# Patient Record
Sex: FEMALE | Race: ASIAN | NOT HISPANIC OR LATINO
[De-identification: names, ages, dates, MRNs, and addresses within clinical notes are randomized per-mention and may not be internally consistent; named-entity substitution may affect disease eponyms.]

---

## 2017-01-05 ENCOUNTER — APPOINTMENT (OUTPATIENT)
Dept: ENDOCRINOLOGY | Facility: CLINIC | Age: 67
End: 2017-01-05

## 2017-01-05 VITALS
HEIGHT: 59.5 IN | DIASTOLIC BLOOD PRESSURE: 84 MMHG | WEIGHT: 163 LBS | OXYGEN SATURATION: 97 % | BODY MASS INDEX: 32.43 KG/M2 | HEART RATE: 88 BPM | SYSTOLIC BLOOD PRESSURE: 122 MMHG

## 2017-01-05 LAB
GLUCOSE BLDC GLUCOMTR-MCNC: 131
HBA1C MFR BLD HPLC: 8.3

## 2017-01-10 LAB
25(OH)D3 SERPL-MCNC: 31 NG/ML
ALBUMIN SERPL ELPH-MCNC: 3.9 G/DL
ALP BLD-CCNC: 84 U/L
ALT SERPL-CCNC: 23 U/L
ANION GAP SERPL CALC-SCNC: 15 MMOL/L
AST SERPL-CCNC: 19 U/L
BILIRUB SERPL-MCNC: 0.5 MG/DL
BUN SERPL-MCNC: 18 MG/DL
CALCIUM SERPL-MCNC: 9.5 MG/DL
CHLORIDE SERPL-SCNC: 103 MMOL/L
CHOLEST SERPL-MCNC: 102 MG/DL
CHOLEST/HDLC SERPL: 2.8 RATIO
CO2 SERPL-SCNC: 23 MMOL/L
CREAT SERPL-MCNC: 0.65 MG/DL
CREAT SPEC-SCNC: 61 MG/DL
GLUCOSE SERPL-MCNC: 131 MG/DL
HDLC SERPL-MCNC: 37 MG/DL
LDLC SERPL CALC-MCNC: 43 MG/DL
MICROALBUMIN 24H UR DL<=1MG/L-MCNC: 63.3 MG/DL
MICROALBUMIN/CREAT 24H UR-RTO: 1038 UG/MG
POTASSIUM SERPL-SCNC: 4.2 MMOL/L
PROT SERPL-MCNC: 7.2 G/DL
SODIUM SERPL-SCNC: 141 MMOL/L
TRIGL SERPL-MCNC: 108 MG/DL
TSH SERPL-ACNC: 0.76 UU/ML

## 2017-01-17 ENCOUNTER — MEDICATION RENEWAL (OUTPATIENT)
Age: 67
End: 2017-01-17

## 2017-02-24 ENCOUNTER — RX RENEWAL (OUTPATIENT)
Age: 67
End: 2017-02-24

## 2017-02-27 ENCOUNTER — APPOINTMENT (OUTPATIENT)
Dept: ENDOCRINOLOGY | Facility: CLINIC | Age: 67
End: 2017-02-27

## 2017-03-01 ENCOUNTER — RESULT REVIEW (OUTPATIENT)
Age: 67
End: 2017-03-01

## 2017-03-02 ENCOUNTER — RX RENEWAL (OUTPATIENT)
Age: 67
End: 2017-03-02

## 2017-03-14 ENCOUNTER — APPOINTMENT (OUTPATIENT)
Dept: NUCLEAR MEDICINE | Facility: CLINIC | Age: 67
End: 2017-03-14

## 2017-03-23 ENCOUNTER — MEDICATION RENEWAL (OUTPATIENT)
Age: 67
End: 2017-03-23

## 2017-03-27 ENCOUNTER — INPATIENT (INPATIENT)
Facility: HOSPITAL | Age: 67
LOS: 2 days | Discharge: HOME | End: 2017-03-30
Attending: INTERNAL MEDICINE | Admitting: INTERNAL MEDICINE
Payer: MEDICARE

## 2017-03-27 ENCOUNTER — RESULT REVIEW (OUTPATIENT)
Age: 67
End: 2017-03-27

## 2017-03-27 VITALS
TEMPERATURE: 98 F | RESPIRATION RATE: 16 BRPM | HEART RATE: 73 BPM | SYSTOLIC BLOOD PRESSURE: 132 MMHG | OXYGEN SATURATION: 100 % | DIASTOLIC BLOOD PRESSURE: 47 MMHG

## 2017-03-27 NOTE — ED ADULT TRIAGE NOTE - CHIEF COMPLAINT QUOTE
pt sent to ER for evaluation for TB. pt was seen at PMD and sent to ER for positive QuantiFeron. pt denies any hemoptysis, rapid weight loss, SOB, CP. pt given surgical mask in triage. pt appears comfortable and in NAD at present. pt placed in Isolation Rm

## 2017-03-28 DIAGNOSIS — A15.0 TUBERCULOSIS OF LUNG: ICD-10-CM

## 2017-03-28 DIAGNOSIS — C50.919 MALIGNANT NEOPLASM OF UNSPECIFIED SITE OF UNSPECIFIED FEMALE BREAST: ICD-10-CM

## 2017-03-28 DIAGNOSIS — Z29.9 ENCOUNTER FOR PROPHYLACTIC MEASURES, UNSPECIFIED: ICD-10-CM

## 2017-03-28 DIAGNOSIS — E11.9 TYPE 2 DIABETES MELLITUS WITHOUT COMPLICATIONS: ICD-10-CM

## 2017-03-28 LAB
ALBUMIN SERPL ELPH-MCNC: 3.9 G/DL — SIGNIFICANT CHANGE UP (ref 3.3–5)
ALP SERPL-CCNC: 87 U/L — SIGNIFICANT CHANGE UP (ref 40–120)
ALT FLD-CCNC: 36 U/L — HIGH (ref 4–33)
APTT BLD: 30.4 SEC — SIGNIFICANT CHANGE UP (ref 27.5–37.4)
AST SERPL-CCNC: 51 U/L — HIGH (ref 4–32)
BASOPHILS # BLD AUTO: 0.05 K/UL — SIGNIFICANT CHANGE UP (ref 0–0.2)
BASOPHILS NFR BLD AUTO: 0.4 % — SIGNIFICANT CHANGE UP (ref 0–2)
BILIRUB SERPL-MCNC: 0.4 MG/DL — SIGNIFICANT CHANGE UP (ref 0.2–1.2)
BLD GP AB SCN SERPL QL: NEGATIVE — SIGNIFICANT CHANGE UP
BUN SERPL-MCNC: 21 MG/DL — SIGNIFICANT CHANGE UP (ref 7–23)
CALCIUM SERPL-MCNC: 9.3 MG/DL — SIGNIFICANT CHANGE UP (ref 8.4–10.5)
CHLORIDE SERPL-SCNC: 97 MMOL/L — LOW (ref 98–107)
CO2 SERPL-SCNC: 19 MMOL/L — LOW (ref 22–31)
CREAT SERPL-MCNC: 0.73 MG/DL — SIGNIFICANT CHANGE UP (ref 0.5–1.3)
EOSINOPHIL # BLD AUTO: 0.31 K/UL — SIGNIFICANT CHANGE UP (ref 0–0.5)
EOSINOPHIL NFR BLD AUTO: 2.4 % — SIGNIFICANT CHANGE UP (ref 0–6)
GLUCOSE SERPL-MCNC: 112 MG/DL — HIGH (ref 70–99)
GRAM STN SPT: SIGNIFICANT CHANGE UP
GRAM STN SPT: SIGNIFICANT CHANGE UP
HCT VFR BLD CALC: 35.6 % — SIGNIFICANT CHANGE UP (ref 34.5–45)
HGB BLD-MCNC: 11.8 G/DL — SIGNIFICANT CHANGE UP (ref 11.5–15.5)
IMM GRANULOCYTES NFR BLD AUTO: 0.5 % — SIGNIFICANT CHANGE UP (ref 0–1.5)
INR BLD: 0.89 — SIGNIFICANT CHANGE UP (ref 0.88–1.17)
LYMPHOCYTES # BLD AUTO: 26.7 % — SIGNIFICANT CHANGE UP (ref 13–44)
LYMPHOCYTES # BLD AUTO: 3.41 K/UL — HIGH (ref 1–3.3)
MCHC RBC-ENTMCNC: 27.9 PG — SIGNIFICANT CHANGE UP (ref 27–34)
MCHC RBC-ENTMCNC: 33.1 % — SIGNIFICANT CHANGE UP (ref 32–36)
MCV RBC AUTO: 84.2 FL — SIGNIFICANT CHANGE UP (ref 80–100)
MONOCYTES # BLD AUTO: 0.78 K/UL — SIGNIFICANT CHANGE UP (ref 0–0.9)
MONOCYTES NFR BLD AUTO: 6.1 % — SIGNIFICANT CHANGE UP (ref 2–14)
NEUTROPHILS # BLD AUTO: 8.16 K/UL — HIGH (ref 1.8–7.4)
NEUTROPHILS NFR BLD AUTO: 63.9 % — SIGNIFICANT CHANGE UP (ref 43–77)
PLATELET # BLD AUTO: 392 K/UL — SIGNIFICANT CHANGE UP (ref 150–400)
PMV BLD: 9.5 FL — SIGNIFICANT CHANGE UP (ref 7–13)
POTASSIUM SERPL-MCNC: SIGNIFICANT CHANGE UP MMOL/L (ref 3.5–5.3)
POTASSIUM SERPL-SCNC: SIGNIFICANT CHANGE UP MMOL/L (ref 3.5–5.3)
PROT SERPL-MCNC: 8.4 G/DL — HIGH (ref 6–8.3)
PROTHROM AB SERPL-ACNC: 9.9 SEC — SIGNIFICANT CHANGE UP (ref 9.8–13.1)
RBC # BLD: 4.23 M/UL — SIGNIFICANT CHANGE UP (ref 3.8–5.2)
RBC # FLD: 14.1 % — SIGNIFICANT CHANGE UP (ref 10.3–14.5)
RH IG SCN BLD-IMP: POSITIVE — SIGNIFICANT CHANGE UP
SODIUM SERPL-SCNC: 137 MMOL/L — SIGNIFICANT CHANGE UP (ref 135–145)
SPECIMEN SOURCE: SIGNIFICANT CHANGE UP
SPECIMEN SOURCE: SIGNIFICANT CHANGE UP
WBC # BLD: 12.78 K/UL — HIGH (ref 3.8–10.5)
WBC # FLD AUTO: 12.78 K/UL — HIGH (ref 3.8–10.5)

## 2017-03-28 PROCEDURE — 88305 TISSUE EXAM BY PATHOLOGIST: CPT | Mod: 26

## 2017-03-28 PROCEDURE — 32550 INSERT PLEURAL CATH: CPT

## 2017-03-28 PROCEDURE — 31624 DX BRONCHOSCOPE/LAVAGE: CPT

## 2017-03-28 PROCEDURE — 99223 1ST HOSP IP/OBS HIGH 75: CPT

## 2017-03-28 PROCEDURE — 88312 SPECIAL STAINS GROUP 1: CPT | Mod: 26

## 2017-03-28 PROCEDURE — 88112 CYTOPATH CELL ENHANCE TECH: CPT | Mod: 26

## 2017-03-28 RX ORDER — AMLODIPINE BESYLATE 2.5 MG/1
2.5 TABLET ORAL
Qty: 0 | Refills: 0 | Status: DISCONTINUED | OUTPATIENT
Start: 2017-03-28 | End: 2017-03-29

## 2017-03-28 RX ORDER — LISINOPRIL 2.5 MG/1
40 TABLET ORAL DAILY
Qty: 0 | Refills: 0 | Status: DISCONTINUED | OUTPATIENT
Start: 2017-03-28 | End: 2017-03-30

## 2017-03-28 RX ORDER — CARVEDILOL PHOSPHATE 80 MG/1
6.25 CAPSULE, EXTENDED RELEASE ORAL EVERY 12 HOURS
Qty: 0 | Refills: 0 | Status: DISCONTINUED | OUTPATIENT
Start: 2017-03-28 | End: 2017-03-30

## 2017-03-28 RX ORDER — DEXTROSE 50 % IN WATER 50 %
25 SYRINGE (ML) INTRAVENOUS ONCE
Qty: 0 | Refills: 0 | Status: DISCONTINUED | OUTPATIENT
Start: 2017-03-28 | End: 2017-03-30

## 2017-03-28 RX ORDER — HEPARIN SODIUM 5000 [USP'U]/ML
5000 INJECTION INTRAVENOUS; SUBCUTANEOUS EVERY 8 HOURS
Qty: 0 | Refills: 0 | Status: DISCONTINUED | OUTPATIENT
Start: 2017-03-28 | End: 2017-03-30

## 2017-03-28 RX ORDER — DEXTROSE 50 % IN WATER 50 %
12.5 SYRINGE (ML) INTRAVENOUS ONCE
Qty: 0 | Refills: 0 | Status: DISCONTINUED | OUTPATIENT
Start: 2017-03-28 | End: 2017-03-30

## 2017-03-28 RX ORDER — SODIUM CHLORIDE 9 MG/ML
3 INJECTION INTRAMUSCULAR; INTRAVENOUS; SUBCUTANEOUS ONCE
Qty: 0 | Refills: 0 | Status: COMPLETED | OUTPATIENT
Start: 2017-03-28 | End: 2017-03-28

## 2017-03-28 RX ORDER — INFLUENZA VIRUS VACCINE 15; 15; 15; 15 UG/.5ML; UG/.5ML; UG/.5ML; UG/.5ML
0.5 SUSPENSION INTRAMUSCULAR ONCE
Qty: 0 | Refills: 0 | Status: DISCONTINUED | OUTPATIENT
Start: 2017-03-28 | End: 2017-03-30

## 2017-03-28 RX ORDER — CHOLECALCIFEROL (VITAMIN D3) 125 MCG
2000 CAPSULE ORAL DAILY
Qty: 0 | Refills: 0 | Status: DISCONTINUED | OUTPATIENT
Start: 2017-03-28 | End: 2017-03-30

## 2017-03-28 RX ORDER — INSULIN LISPRO 100/ML
VIAL (ML) SUBCUTANEOUS
Qty: 0 | Refills: 0 | Status: DISCONTINUED | OUTPATIENT
Start: 2017-03-28 | End: 2017-03-29

## 2017-03-28 RX ORDER — SODIUM CHLORIDE 9 MG/ML
1000 INJECTION, SOLUTION INTRAVENOUS
Qty: 0 | Refills: 0 | Status: DISCONTINUED | OUTPATIENT
Start: 2017-03-28 | End: 2017-03-28

## 2017-03-28 RX ORDER — GLUCAGON INJECTION, SOLUTION 0.5 MG/.1ML
1 INJECTION, SOLUTION SUBCUTANEOUS ONCE
Qty: 0 | Refills: 0 | Status: DISCONTINUED | OUTPATIENT
Start: 2017-03-28 | End: 2017-03-30

## 2017-03-28 RX ORDER — SODIUM CHLORIDE 9 MG/ML
1000 INJECTION INTRAMUSCULAR; INTRAVENOUS; SUBCUTANEOUS
Qty: 0 | Refills: 0 | Status: DISCONTINUED | OUTPATIENT
Start: 2017-03-28 | End: 2017-03-30

## 2017-03-28 RX ORDER — DEXTROSE 50 % IN WATER 50 %
1 SYRINGE (ML) INTRAVENOUS ONCE
Qty: 0 | Refills: 0 | Status: DISCONTINUED | OUTPATIENT
Start: 2017-03-28 | End: 2017-03-30

## 2017-03-28 RX ADMIN — SODIUM CHLORIDE 75 MILLILITER(S): 9 INJECTION INTRAMUSCULAR; INTRAVENOUS; SUBCUTANEOUS at 11:01

## 2017-03-28 RX ADMIN — CARVEDILOL PHOSPHATE 6.25 MILLIGRAM(S): 80 CAPSULE, EXTENDED RELEASE ORAL at 19:25

## 2017-03-28 RX ADMIN — AMLODIPINE BESYLATE 2.5 MILLIGRAM(S): 2.5 TABLET ORAL at 19:24

## 2017-03-28 RX ADMIN — SODIUM CHLORIDE 3 MILLILITER(S): 9 INJECTION INTRAMUSCULAR; INTRAVENOUS; SUBCUTANEOUS at 01:20

## 2017-03-28 RX ADMIN — HEPARIN SODIUM 5000 UNIT(S): 5000 INJECTION INTRAVENOUS; SUBCUTANEOUS at 21:47

## 2017-03-28 NOTE — H&P ADULT. - ASSESSMENT
66 y/o F with DM2, recently diagnosed breast cancer admitted from [facility] pulmonology for concern for Tb.

## 2017-03-28 NOTE — H&P ADULT. - HISTORY OF PRESENT ILLNESS
66 y/o F with DM2, recently diagnosed breast cancer admitted for concern for Tb.    Patient was recently diagnosed with breast cancer. She had imaging for staging and a PET and CT chest showed a lung lesion. She was seen by her pulmonologist, Dr. Jean Cerna, who did a QuantiFeron test, which came back positive. Patient was sent to the ED by Dr. Cerna for a bronch to r/o Tb. Patient denies any cough, hemoptysis, weight loss, chills, fever. She moved from [place] to [place] over 10 years ago. No close contacts with active Tb. Currently patient's only complaint is feeling weak because she has been NPO since arriving in the ED.    On admission, vitals were 98.1, 73, 134/47, 100% on RA. Labs notable for WBC 12.78, AST/ALT 51/36. Imaging done at [facility], not available here. Discs left in the chart to be uploaded to our system.

## 2017-03-28 NOTE — ED PROVIDER NOTE - MEDICAL DECISION MAKING DETAILS
pt with a positive blood test and imaging concerning for TB.  Here for bronch.  [facility] get pre-op labs [facility]

## 2017-03-28 NOTE — H&P ADULT. - PROBLEM SELECTOR PLAN 4
- currently holding chemical ppx given possibility of procedure, if no procedure would start lovenox for dvt ppx

## 2017-03-28 NOTE — H&P ADULT. - PROBLEM SELECTOR PLAN 1
Unable to see images of CT, but per report, there is concern for a lesion that may be Tb given + Quant. She has no s/s of active Tb currently.  - discussed with Dr. Carrasco - she will consult pulmonology Unable to see images of CT, but per report, there is concern for a lesion that may be Tb given + Quant. She has no s/s of active Tb currently.  - discussed with Dr. Carrasco - she will consult pulmonology  - family has imaging discs, if pulm feels it would be helpful, we can have them loaded into our system (discs placed in chart)

## 2017-03-28 NOTE — ED PROVIDER NOTE - OBJECTIVE STATEMENT
66 yo [place] 90s recently Dx with B breast Ca.  On routine staging for PET found to have lung lesion which was referred to pulm and on CT lesion concerning for TB.  Got quantaferon test which was positive and referred to ED for bronch.  Pt denies any cough with hemoptysis, weight loss or night sweats.  No other TB risk factors except country of orgin

## 2017-03-29 LAB
ALBUMIN SERPL ELPH-MCNC: 3.5 G/DL — SIGNIFICANT CHANGE UP (ref 3.3–5)
ALP SERPL-CCNC: 79 U/L — SIGNIFICANT CHANGE UP (ref 40–120)
ALT FLD-CCNC: 22 U/L — SIGNIFICANT CHANGE UP (ref 4–33)
APPEARANCE UR: CLEAR — SIGNIFICANT CHANGE UP
AST SERPL-CCNC: 21 U/L — SIGNIFICANT CHANGE UP (ref 4–32)
BILIRUB SERPL-MCNC: 0.3 MG/DL — SIGNIFICANT CHANGE UP (ref 0.2–1.2)
BILIRUB UR-MCNC: NEGATIVE — SIGNIFICANT CHANGE UP
BLOOD UR QL VISUAL: NEGATIVE — SIGNIFICANT CHANGE UP
BUN SERPL-MCNC: 14 MG/DL — SIGNIFICANT CHANGE UP (ref 7–23)
CALCIUM SERPL-MCNC: 9.1 MG/DL — SIGNIFICANT CHANGE UP (ref 8.4–10.5)
CHLORIDE SERPL-SCNC: 99 MMOL/L — SIGNIFICANT CHANGE UP (ref 98–107)
CO2 SERPL-SCNC: 23 MMOL/L — SIGNIFICANT CHANGE UP (ref 22–31)
COLOR SPEC: SIGNIFICANT CHANGE UP
CREAT SERPL-MCNC: 0.63 MG/DL — SIGNIFICANT CHANGE UP (ref 0.5–1.3)
CULTURE - ACID FAST SMEAR CONCENTRATED: SIGNIFICANT CHANGE UP
CULTURE - ACID FAST SMEAR CONCENTRATED: SIGNIFICANT CHANGE UP
GLUCOSE SERPL-MCNC: 300 MG/DL — HIGH (ref 70–99)
GLUCOSE UR-MCNC: >1000 — SIGNIFICANT CHANGE UP
HBA1C BLD-MCNC: 7.3 % — HIGH (ref 4–5.6)
HCT VFR BLD CALC: 33.5 % — LOW (ref 34.5–45)
HGB BLD-MCNC: 10.7 G/DL — LOW (ref 11.5–15.5)
KETONES UR-MCNC: NEGATIVE — SIGNIFICANT CHANGE UP
LACTATE SERPL-SCNC: 1.2 MMOL/L — SIGNIFICANT CHANGE UP (ref 0.5–2)
LEUKOCYTE ESTERASE UR-ACNC: SIGNIFICANT CHANGE UP
MCHC RBC-ENTMCNC: 27.2 PG — SIGNIFICANT CHANGE UP (ref 27–34)
MCHC RBC-ENTMCNC: 31.9 % — LOW (ref 32–36)
MCV RBC AUTO: 85 FL — SIGNIFICANT CHANGE UP (ref 80–100)
NITRITE UR-MCNC: NEGATIVE — SIGNIFICANT CHANGE UP
PH UR: 6.5 — SIGNIFICANT CHANGE UP (ref 4.6–8)
PLATELET # BLD AUTO: 341 K/UL — SIGNIFICANT CHANGE UP (ref 150–400)
PMV BLD: 9.6 FL — SIGNIFICANT CHANGE UP (ref 7–13)
POTASSIUM SERPL-MCNC: 4.2 MMOL/L — SIGNIFICANT CHANGE UP (ref 3.5–5.3)
POTASSIUM SERPL-SCNC: 4.2 MMOL/L — SIGNIFICANT CHANGE UP (ref 3.5–5.3)
PROT SERPL-MCNC: 7.2 G/DL — SIGNIFICANT CHANGE UP (ref 6–8.3)
PROT UR-MCNC: 30 — HIGH
RBC # BLD: 3.94 M/UL — SIGNIFICANT CHANGE UP (ref 3.8–5.2)
RBC # FLD: 14.1 % — SIGNIFICANT CHANGE UP (ref 10.3–14.5)
RBC CASTS # UR COMP ASSIST: SIGNIFICANT CHANGE UP (ref 0–?)
SODIUM SERPL-SCNC: 138 MMOL/L — SIGNIFICANT CHANGE UP (ref 135–145)
SP GR SPEC: 1.01 — SIGNIFICANT CHANGE UP (ref 1–1.03)
SPECIMEN SOURCE: SIGNIFICANT CHANGE UP
SPECIMEN SOURCE: SIGNIFICANT CHANGE UP
SQUAMOUS # UR AUTO: SIGNIFICANT CHANGE UP
UROBILINOGEN FLD QL: NORMAL E.U. — SIGNIFICANT CHANGE UP (ref 0.1–0.2)
WBC # BLD: 15.9 K/UL — HIGH (ref 3.8–10.5)
WBC # FLD AUTO: 15.9 K/UL — HIGH (ref 3.8–10.5)
WBC UR QL: HIGH (ref 0–?)

## 2017-03-29 PROCEDURE — 71010: CPT | Mod: 26

## 2017-03-29 RX ORDER — AMLODIPINE BESYLATE 2.5 MG/1
2.5 TABLET ORAL ONCE
Qty: 0 | Refills: 0 | Status: COMPLETED | OUTPATIENT
Start: 2017-03-29 | End: 2017-03-29

## 2017-03-29 RX ORDER — AMLODIPINE BESYLATE 2.5 MG/1
5 TABLET ORAL
Qty: 0 | Refills: 0 | Status: DISCONTINUED | OUTPATIENT
Start: 2017-03-30 | End: 2017-03-30

## 2017-03-29 RX ORDER — INSULIN GLARGINE 100 [IU]/ML
15 INJECTION, SOLUTION SUBCUTANEOUS AT BEDTIME
Qty: 0 | Refills: 0 | Status: DISCONTINUED | OUTPATIENT
Start: 2017-03-29 | End: 2017-03-30

## 2017-03-29 RX ORDER — INSULIN LISPRO 100/ML
VIAL (ML) SUBCUTANEOUS
Qty: 0 | Refills: 0 | Status: DISCONTINUED | OUTPATIENT
Start: 2017-03-29 | End: 2017-03-30

## 2017-03-29 RX ORDER — INSULIN LISPRO 100/ML
VIAL (ML) SUBCUTANEOUS AT BEDTIME
Qty: 0 | Refills: 0 | Status: DISCONTINUED | OUTPATIENT
Start: 2017-03-29 | End: 2017-03-30

## 2017-03-29 RX ORDER — INSULIN LISPRO 100/ML
7 VIAL (ML) SUBCUTANEOUS
Qty: 0 | Refills: 0 | Status: DISCONTINUED | OUTPATIENT
Start: 2017-03-29 | End: 2017-03-30

## 2017-03-29 RX ORDER — ACETAMINOPHEN 500 MG
650 TABLET ORAL ONCE
Qty: 0 | Refills: 0 | Status: COMPLETED | OUTPATIENT
Start: 2017-03-29 | End: 2017-03-29

## 2017-03-29 RX ADMIN — HEPARIN SODIUM 5000 UNIT(S): 5000 INJECTION INTRAVENOUS; SUBCUTANEOUS at 13:41

## 2017-03-29 RX ADMIN — CARVEDILOL PHOSPHATE 6.25 MILLIGRAM(S): 80 CAPSULE, EXTENDED RELEASE ORAL at 05:08

## 2017-03-29 RX ADMIN — AMLODIPINE BESYLATE 2.5 MILLIGRAM(S): 2.5 TABLET ORAL at 18:08

## 2017-03-29 RX ADMIN — HEPARIN SODIUM 5000 UNIT(S): 5000 INJECTION INTRAVENOUS; SUBCUTANEOUS at 05:08

## 2017-03-29 RX ADMIN — Medication 650 MILLIGRAM(S): at 02:17

## 2017-03-29 RX ADMIN — LISINOPRIL 40 MILLIGRAM(S): 2.5 TABLET ORAL at 05:08

## 2017-03-29 RX ADMIN — AMLODIPINE BESYLATE 2.5 MILLIGRAM(S): 2.5 TABLET ORAL at 05:08

## 2017-03-29 RX ADMIN — CARVEDILOL PHOSPHATE 6.25 MILLIGRAM(S): 80 CAPSULE, EXTENDED RELEASE ORAL at 18:08

## 2017-03-29 RX ADMIN — Medication 4: at 12:33

## 2017-03-29 RX ADMIN — AMLODIPINE BESYLATE 2.5 MILLIGRAM(S): 2.5 TABLET ORAL at 18:51

## 2017-03-29 RX ADMIN — SODIUM CHLORIDE 75 MILLILITER(S): 9 INJECTION INTRAMUSCULAR; INTRAVENOUS; SUBCUTANEOUS at 12:34

## 2017-03-29 RX ADMIN — Medication 2000 UNIT(S): at 12:33

## 2017-03-29 RX ADMIN — SODIUM CHLORIDE 75 MILLILITER(S): 9 INJECTION INTRAMUSCULAR; INTRAVENOUS; SUBCUTANEOUS at 05:08

## 2017-03-29 RX ADMIN — INSULIN GLARGINE 15 UNIT(S): 100 INJECTION, SOLUTION SUBCUTANEOUS at 22:55

## 2017-03-29 RX ADMIN — SODIUM CHLORIDE 75 MILLILITER(S): 9 INJECTION INTRAMUSCULAR; INTRAVENOUS; SUBCUTANEOUS at 18:52

## 2017-03-29 RX ADMIN — Medication 7 UNIT(S): at 18:08

## 2017-03-29 RX ADMIN — Medication 6: at 08:43

## 2017-03-29 RX ADMIN — HEPARIN SODIUM 5000 UNIT(S): 5000 INJECTION INTRAVENOUS; SUBCUTANEOUS at 22:56

## 2017-03-29 RX ADMIN — Medication 2: at 18:08

## 2017-03-30 ENCOUNTER — TRANSCRIPTION ENCOUNTER (OUTPATIENT)
Age: 67
End: 2017-03-30

## 2017-03-30 VITALS
SYSTOLIC BLOOD PRESSURE: 153 MMHG | HEART RATE: 78 BPM | DIASTOLIC BLOOD PRESSURE: 47 MMHG | RESPIRATION RATE: 18 BRPM | TEMPERATURE: 100 F | OXYGEN SATURATION: 97 %

## 2017-03-30 LAB
ALBUMIN SERPL ELPH-MCNC: 3.5 G/DL — SIGNIFICANT CHANGE UP (ref 3.3–5)
ALP SERPL-CCNC: 78 U/L — SIGNIFICANT CHANGE UP (ref 40–120)
ALT FLD-CCNC: 34 U/L — HIGH (ref 4–33)
AST SERPL-CCNC: 28 U/L — SIGNIFICANT CHANGE UP (ref 4–32)
BACTERIA SPT RESP CULT: SIGNIFICANT CHANGE UP
BACTERIA SPT RESP CULT: SIGNIFICANT CHANGE UP
BILIRUB SERPL-MCNC: 0.4 MG/DL — SIGNIFICANT CHANGE UP (ref 0.2–1.2)
BUN SERPL-MCNC: 10 MG/DL — SIGNIFICANT CHANGE UP (ref 7–23)
CALCIUM SERPL-MCNC: 9.4 MG/DL — SIGNIFICANT CHANGE UP (ref 8.4–10.5)
CHLORIDE SERPL-SCNC: 102 MMOL/L — SIGNIFICANT CHANGE UP (ref 98–107)
CO2 SERPL-SCNC: 21 MMOL/L — LOW (ref 22–31)
CREAT SERPL-MCNC: 0.62 MG/DL — SIGNIFICANT CHANGE UP (ref 0.5–1.3)
GLUCOSE SERPL-MCNC: 207 MG/DL — HIGH (ref 70–99)
HCT VFR BLD CALC: 34 % — LOW (ref 34.5–45)
HGB BLD-MCNC: 10.8 G/DL — LOW (ref 11.5–15.5)
MCHC RBC-ENTMCNC: 26.9 PG — LOW (ref 27–34)
MCHC RBC-ENTMCNC: 31.8 % — LOW (ref 32–36)
MCV RBC AUTO: 84.6 FL — SIGNIFICANT CHANGE UP (ref 80–100)
NON-GYNECOLOGICAL CYTOLOGY STUDY: SIGNIFICANT CHANGE UP
PLATELET # BLD AUTO: 350 K/UL — SIGNIFICANT CHANGE UP (ref 150–400)
PMV BLD: 10 FL — SIGNIFICANT CHANGE UP (ref 7–13)
POTASSIUM SERPL-MCNC: 4 MMOL/L — SIGNIFICANT CHANGE UP (ref 3.5–5.3)
POTASSIUM SERPL-SCNC: 4 MMOL/L — SIGNIFICANT CHANGE UP (ref 3.5–5.3)
PROT SERPL-MCNC: 7.4 G/DL — SIGNIFICANT CHANGE UP (ref 6–8.3)
RBC # BLD: 4.02 M/UL — SIGNIFICANT CHANGE UP (ref 3.8–5.2)
RBC # FLD: 14.2 % — SIGNIFICANT CHANGE UP (ref 10.3–14.5)
SODIUM SERPL-SCNC: 139 MMOL/L — SIGNIFICANT CHANGE UP (ref 135–145)
SPECIMEN SOURCE: SIGNIFICANT CHANGE UP
WBC # BLD: 9.9 K/UL — SIGNIFICANT CHANGE UP (ref 3.8–10.5)
WBC # FLD AUTO: 9.9 K/UL — SIGNIFICANT CHANGE UP (ref 3.8–10.5)

## 2017-03-30 RX ORDER — PREGABALIN 225 MG/1
1 CAPSULE ORAL
Qty: 0 | Refills: 0 | COMMUNITY

## 2017-03-30 RX ADMIN — Medication 4: at 08:39

## 2017-03-30 RX ADMIN — CARVEDILOL PHOSPHATE 6.25 MILLIGRAM(S): 80 CAPSULE, EXTENDED RELEASE ORAL at 05:55

## 2017-03-30 RX ADMIN — AMLODIPINE BESYLATE 5 MILLIGRAM(S): 2.5 TABLET ORAL at 05:55

## 2017-03-30 RX ADMIN — HEPARIN SODIUM 5000 UNIT(S): 5000 INJECTION INTRAVENOUS; SUBCUTANEOUS at 05:55

## 2017-03-30 RX ADMIN — SODIUM CHLORIDE 75 MILLILITER(S): 9 INJECTION INTRAMUSCULAR; INTRAVENOUS; SUBCUTANEOUS at 06:00

## 2017-03-30 RX ADMIN — Medication 7 UNIT(S): at 12:08

## 2017-03-30 RX ADMIN — Medication 2: at 12:08

## 2017-03-30 RX ADMIN — Medication 2000 UNIT(S): at 12:09

## 2017-03-30 RX ADMIN — Medication 7 UNIT(S): at 08:39

## 2017-03-30 RX ADMIN — HEPARIN SODIUM 5000 UNIT(S): 5000 INJECTION INTRAVENOUS; SUBCUTANEOUS at 15:00

## 2017-03-30 RX ADMIN — LISINOPRIL 40 MILLIGRAM(S): 2.5 TABLET ORAL at 05:55

## 2017-03-30 NOTE — DISCHARGE NOTE ADULT - MEDICATION SUMMARY - MEDICATIONS TO TAKE
I will START or STAY ON the medications listed below when I get home from the hospital:    Magnesium Capsules  -- 1 cap(s) by mouth once a day  -- Indication: For supplement    Calcium  -- 1 tab(s) by mouth once a day  -- Indication: For supplement    aspirin 81 mg oral delayed release tablet  -- 1 tab(s) by mouth 3 to 4 times per week  -- Indication: For CAD prophylaxis    ramipril 10 mg oral capsule  -- 1 cap(s) by mouth 2 times a day  -- Indication: For hypertension    metFORMIN 1000 mg oral tablet  -- 1 tab(s) by mouth 2 times a day  -- Indication: For Type 2 diabetes mellitus without complication, without long-term current use of insulin    HumuLIN 70/30 KwikPen 70 units-30 units/mL subcutaneous suspension  --  46 units subcutaneous in the morning and 26 units subcutaneous in the evening  -- Indication: For Type 2 diabetes mellitus without complication, without long-term current use of insulin    carvedilol 12.5 mg oral tablet  -- 1 tab(s) by mouth 2 times a day  -- Indication: For hypertension    amLODIPine 5 mg oral tablet  -- 1 tab(s) by mouth 2 times a day  -- Indication: For hypertension    furosemide 20 mg oral tablet  -- 1 tab(s) by mouth once a day  -- Indication: For hypertension    Flax Seed Oil oral capsule  -- 1 cap(s) by mouth once a day  -- Indication: For supplement    tiZANidine 2 mg oral capsule  -- 1 cap(s) by mouth once a day (at bedtime)  -- Indication: For insomnia    multivitamin  -- 1 tab(s) by mouth once a day  -- Indication: For supplement    B-12 Resin 1000 mcg oral tablet  -- 1 tab(s) by mouth once a day  -- Indication: For supplement    biotin 5 mg oral capsule  -- 1 cap(s) by mouth once a day  -- Indication: For supplement    Vitamin D3 2000 intl units oral tablet  -- 1 tab(s) by mouth once a day  -- Indication: For supplement

## 2017-03-30 NOTE — DISCHARGE NOTE ADULT - CARE PROVIDER_API CALL
Dr. Cerna,   Pulmonology  Phone: (   )    -  Fax: (   )    -    Dr. Jolley,   Oncology  Phone: (   )    -  Fax: (   )    -    [facility] PCP,   Phone: (   )    -  Fax: (   )    - Dr. Cerna,   Pulmonology  Phone: (   )    -  Fax: (   )    -    [facility] PCP,   Phone: (   )    -  Fax: (   )    -    Silver Campuzano), Internal Medicine; Medical Oncology  [address] [place] [address]  Phone: [phone]  Fax: [phone]

## 2017-03-30 NOTE — DISCHARGE NOTE ADULT - HOSPITAL COURSE
66 [place] 90s recently Dx with B breast Ca.  On routine staging for PET found to have lung lesion which was referred to pulm and on CT lesion concerning for TB.  Got quantaferon test which was positive and referred to ED for bronch.     Hospital Course:    TB (pulmonary tuberculosis)  -Unable to see images of CT, but per report, there is concern for a lesion that may be Tb given + Quant.   -She has no s/s of active Tb currently.  - family has imaging discs, if pulm feels it would be helpful, we can have them loaded into our system (discs placed in chart)Unable to see images of CT, but per report, there is concern for a lesion that may be Tb given + Quant. She has no s/s of active Tb currently.  -pulmonary consulted - Rell Mcnulty  -CT surgery consulted for bronch on 3/28: specimens sent, afb negative, culture NGTD  -AFB- negative    Fever-UA negative  -CXR- Ill-defined opacity in the right upper lung field may represent bronchiectasis and a possible cavity formation.   blood cultures negative,   sputum cultures negative  afb cultures negative    Type 2 diabetes mellitus without complication- insulin sliding scale during admsision-A1c- 7.3  Malignant neoplasm of female breast-Followed by onc outpatient  Prophylactic measure- currently holding chemical ppx given possibility of procedure, if no procedure would start lovenox for dvt ppx.     dispo: home 68 yo [place] 90s recently Dx with Breast Ca.  On routine staging for PET found to have lung lesion which was referred to pulm and on CT lesion concerning for TB.  Got quantaferon test which was positive and referred to ED for bronch.     Hospital Course:    TB (pulmonary tuberculosis) - ruled out.  - Outpt CT with RUL lesion.  - She has no s/s of active Tb currently.  - family has imaging discs, if pulm feels it would be helpful, we can have them loaded into our system (discs placed in chart)Unable to see images of CT, but per report, there is concern for a lesion that may be Tb given + Quant. She has no s/s of active Tb currently.  -pulmonary consulted - Dr. Mcnulty  -CT surgery consulted. bronch done on 3/28: specimens sent, afb negative, culture NGTD  -AFB- negative    Fever post anesthesia and left arm rash-UA negative, resolved. likely reaction to anesthetics.   -CXR- Ill-defined opacity in the right upper lung field may represent bronchiectasis and a possible cavity formation.   blood cultures negative,   sputum cultures negative  afb cultures negative    Type 2 diabetes mellitus without complication- insulin sliding scale during admsision-A1c- 7.3  Malignant neoplasm of female breast-Followed by onc outpatient  Prophylactic measure- currently holding chemical ppx given possibility of procedure, if no procedure would start lovenox for dvt ppx.     dispo: home  d/w  at bedside.     More than 30 mins were spent evaluating patient and coordinating care for discharge.

## 2017-03-30 NOTE — DISCHARGE NOTE ADULT - PROVIDER TOKENS
FREE:[LAST:[Dr. Cerna],PHONE:[(   )    -],FAX:[(   )    -],ADDRESS:[Pulmonology]],FREE:[LAST:[Dr. Jolley],PHONE:[(   )    -],FAX:[(   )    -],ADDRESS:[Oncology]],FREE:[LAST:[[facility] PCP],PHONE:[(   )    -],FAX:[(   )    -]] FREE:[LAST:[Dr. Cerna],PHONE:[(   )    -],FAX:[(   )    -],ADDRESS:[Pulmonology]],FREE:[LAST:[[facility] PCP],PHONE:[(   )    -],FAX:[(   )    -]],TOKEN:'[id number]'

## 2017-03-30 NOTE — DISCHARGE NOTE ADULT - PLAN OF CARE
ruled out You were evaluated for a suspicion of TB during your stay.  Specimens were sent that were negative for TB.  No further medication is necessary for treatment.   Follow up with your pulmonologist Dr. Cerna for further pulmonary management. Follow up with Dr. Donnelly for further management. Continue a consistent carbohydrate/ diabetic diet  monitor blood sugars Follow up with Dr. Campuzano for further management. Continue a consistent carbohydrate/ diabetic diet  monitor blood sugars  Continue blood sugar monitoring and continue insulin

## 2017-03-30 NOTE — DISCHARGE NOTE ADULT - CARE PLAN
Principal Discharge DX:	TB (pulmonary tuberculosis)  Goal:	ruled out  Instructions for follow-up, activity and diet:	You were evaluated for a suspicion of TB during your stay.  Specimens were sent that were negative for TB.  No further medication is necessary for treatment.   Follow up with your pulmonologist Dr. Cerna for further pulmonary management.  Secondary Diagnosis:	Breast CA  Instructions for follow-up, activity and diet:	Follow up with Dr. Donnelly for further management.  Secondary Diagnosis:	Type 2 diabetes mellitus without complication, without long-term current use of insulin  Instructions for follow-up, activity and diet:	Continue a consistent carbohydrate/ diabetic diet  monitor blood sugars Principal Discharge DX:	TB (pulmonary tuberculosis)  Goal:	ruled out  Instructions for follow-up, activity and diet:	You were evaluated for a suspicion of TB during your stay.  Specimens were sent that were negative for TB.  No further medication is necessary for treatment.   Follow up with your pulmonologist Dr. Cerna for further pulmonary management.  Secondary Diagnosis:	Breast CA  Instructions for follow-up, activity and diet:	Follow up with Dr. Campuzano for further management.  Secondary Diagnosis:	Type 2 diabetes mellitus without complication, without long-term current use of insulin  Instructions for follow-up, activity and diet:	Continue a consistent carbohydrate/ diabetic diet  monitor blood sugars  Continue blood sugar monitoring and continue insulin

## 2017-03-30 NOTE — DISCHARGE NOTE ADULT - PATIENT PORTAL LINK FT
“You can access the [facility] Patient Portal, offered by [facility], by registering with the following website: [email or web address]”

## 2017-03-30 NOTE — DISCHARGE NOTE ADULT - CARE PROVIDERS DIRECT ADDRESSES
,DirectAddress_Unknown,DirectAddress_Unknown,DirectAddress_Unknown,[email or web address], [email or web address] HospitalriLists of [email or web address] in the [email or web address]

## 2017-04-03 LAB
BACTERIA BLD CULT: SIGNIFICANT CHANGE UP
BACTERIA BLD CULT: SIGNIFICANT CHANGE UP

## 2017-04-04 LAB
SPECIMEN SOURCE: SIGNIFICANT CHANGE UP
SPECIMEN SOURCE: SIGNIFICANT CHANGE UP

## 2017-04-06 ENCOUNTER — TRANSCRIPTION ENCOUNTER (OUTPATIENT)
Age: 67
End: 2017-04-06

## 2017-04-25 LAB
FUNGUS SPEC QL CULT: SIGNIFICANT CHANGE UP
FUNGUS SPEC QL CULT: SIGNIFICANT CHANGE UP

## 2017-05-09 LAB
ACID FAST STN SPEC: SIGNIFICANT CHANGE UP
ACID FAST STN SPEC: SIGNIFICANT CHANGE UP

## 2017-05-11 ENCOUNTER — APPOINTMENT (OUTPATIENT)
Dept: ENDOCRINOLOGY | Facility: CLINIC | Age: 67
End: 2017-05-11

## 2017-07-17 PROBLEM — C50.919 MALIGNANT NEOPLASM OF UNSPECIFIED SITE OF UNSPECIFIED FEMALE BREAST: Chronic | Status: ACTIVE | Noted: 2017-03-28

## 2017-07-17 PROBLEM — E11.9 TYPE 2 DIABETES MELLITUS WITHOUT COMPLICATIONS: Chronic | Status: ACTIVE | Noted: 2017-03-28

## 2017-07-30 ENCOUNTER — RX RENEWAL (OUTPATIENT)
Age: 67
End: 2017-07-30

## 2017-08-31 ENCOUNTER — APPOINTMENT (OUTPATIENT)
Dept: ENDOCRINOLOGY | Facility: CLINIC | Age: 67
End: 2017-08-31
Payer: MEDICARE

## 2017-08-31 VITALS
SYSTOLIC BLOOD PRESSURE: 110 MMHG | HEIGHT: 59 IN | BODY MASS INDEX: 30.24 KG/M2 | WEIGHT: 150 LBS | HEART RATE: 87 BPM | DIASTOLIC BLOOD PRESSURE: 60 MMHG | OXYGEN SATURATION: 98 %

## 2017-08-31 PROCEDURE — 99214 OFFICE O/P EST MOD 30 MIN: CPT

## 2017-08-31 RX ORDER — FUROSEMIDE 20 MG/1
20 TABLET ORAL
Qty: 1 | Refills: 2 | Status: DISCONTINUED | COMMUNITY
Start: 2017-01-05 | End: 2017-08-31

## 2017-09-01 LAB
25(OH)D3 SERPL-MCNC: 12.2 NG/ML
ANION GAP SERPL CALC-SCNC: 16 MMOL/L
BUN SERPL-MCNC: 13 MG/DL
CALCIUM SERPL-MCNC: 9.5 MG/DL
CHLORIDE SERPL-SCNC: 101 MMOL/L
CO2 SERPL-SCNC: 21 MMOL/L
CREAT SERPL-MCNC: 1 MG/DL
GLUCOSE SERPL-MCNC: 105 MG/DL
POTASSIUM SERPL-SCNC: 5.2 MMOL/L
SODIUM SERPL-SCNC: 138 MMOL/L

## 2017-09-18 ENCOUNTER — MEDICATION RENEWAL (OUTPATIENT)
Age: 67
End: 2017-09-18

## 2017-09-18 RX ORDER — INSULIN LISPRO 100 [IU]/ML
100 INJECTION, SOLUTION INTRAVENOUS; SUBCUTANEOUS
Qty: 1 | Refills: 3 | Status: DISCONTINUED | COMMUNITY
Start: 2017-08-31 | End: 2017-09-18

## 2017-09-18 RX ORDER — INSULIN GLARGINE 100 [IU]/ML
100 INJECTION, SOLUTION SUBCUTANEOUS
Qty: 1 | Refills: 3 | Status: DISCONTINUED | COMMUNITY
Start: 2017-08-31 | End: 2017-09-18

## 2017-10-05 ENCOUNTER — MESSAGE (OUTPATIENT)
Age: 67
End: 2017-10-05

## 2017-10-06 ENCOUNTER — CLINICAL ADVICE (OUTPATIENT)
Age: 67
End: 2017-10-06

## 2017-10-30 ENCOUNTER — RX RENEWAL (OUTPATIENT)
Age: 67
End: 2017-10-30

## 2017-11-02 ENCOUNTER — CLINICAL ADVICE (OUTPATIENT)
Age: 67
End: 2017-11-02

## 2017-12-22 ENCOUNTER — APPOINTMENT (OUTPATIENT)
Dept: OBGYN | Facility: CLINIC | Age: 67
End: 2017-12-22

## 2017-12-28 ENCOUNTER — APPOINTMENT (OUTPATIENT)
Dept: ENDOCRINOLOGY | Facility: CLINIC | Age: 67
End: 2017-12-28
Payer: MEDICARE

## 2017-12-28 VITALS
BODY MASS INDEX: 31.85 KG/M2 | WEIGHT: 158 LBS | DIASTOLIC BLOOD PRESSURE: 72 MMHG | SYSTOLIC BLOOD PRESSURE: 120 MMHG | HEIGHT: 59 IN | HEART RATE: 82 BPM | OXYGEN SATURATION: 96 %

## 2017-12-28 DIAGNOSIS — R21 RASH AND OTHER NONSPECIFIC SKIN ERUPTION: ICD-10-CM

## 2017-12-28 LAB
GLUCOSE BLDC GLUCOMTR-MCNC: 373
HBA1C MFR BLD HPLC: 8.2

## 2017-12-28 PROCEDURE — 83036 HEMOGLOBIN GLYCOSYLATED A1C: CPT | Mod: QW

## 2017-12-28 PROCEDURE — 82962 GLUCOSE BLOOD TEST: CPT

## 2017-12-28 PROCEDURE — 99214 OFFICE O/P EST MOD 30 MIN: CPT | Mod: 25

## 2017-12-28 RX ORDER — HYDROCORTISONE 25 MG/G
2.5 CREAM TOPICAL DAILY
Qty: 1 | Refills: 0 | Status: DISCONTINUED | COMMUNITY
Start: 2017-08-31 | End: 2017-12-28

## 2017-12-28 RX ORDER — AMLODIPINE BESYLATE 5 MG/1
5 TABLET ORAL DAILY
Qty: 90 | Refills: 1 | Status: DISCONTINUED | COMMUNITY
Start: 2017-08-31 | End: 2017-12-28

## 2017-12-28 RX ORDER — ASPIRIN ENTERIC COATED TABLETS 81 MG 81 MG/1
81 TABLET, DELAYED RELEASE ORAL
Qty: 90 | Refills: 1 | Status: DISCONTINUED | COMMUNITY
Start: 2017-08-31 | End: 2017-12-28

## 2017-12-28 RX ORDER — NIZATIDINE 150 MG/1
150 CAPSULE ORAL
Qty: 30 | Refills: 0 | Status: DISCONTINUED | COMMUNITY
Start: 2017-08-31 | End: 2017-12-28

## 2017-12-28 RX ORDER — DENOSUMAB 60 MG/ML
60 INJECTION SUBCUTANEOUS
Qty: 1 | Refills: 1 | Status: DISCONTINUED | COMMUNITY
Start: 2017-01-05 | End: 2017-12-28

## 2017-12-28 RX ORDER — LETROZOLE TABLETS 2.5 MG/1
2.5 TABLET, FILM COATED ORAL
Qty: 90 | Refills: 0 | Status: ACTIVE | COMMUNITY
Start: 2017-09-14

## 2017-12-28 RX ORDER — RIFAMPIN 300 MG/1
300 CAPSULE ORAL DAILY
Qty: 60 | Refills: 0 | Status: DISCONTINUED | COMMUNITY
Start: 2017-08-31 | End: 2017-12-28

## 2018-01-30 ENCOUNTER — RX RENEWAL (OUTPATIENT)
Age: 68
End: 2018-01-30

## 2018-01-30 ENCOUNTER — INPATIENT (INPATIENT)
Facility: HOSPITAL | Age: 68
LOS: 58 days | Discharge: HOME | DRG: 4 | End: 2018-03-30
Attending: STUDENT IN AN ORGANIZED HEALTH CARE EDUCATION/TRAINING PROGRAM | Admitting: SPECIALIST
Payer: MEDICARE

## 2018-01-30 VITALS
TEMPERATURE: 100 F | OXYGEN SATURATION: 93 % | DIASTOLIC BLOOD PRESSURE: 78 MMHG | SYSTOLIC BLOOD PRESSURE: 124 MMHG | HEART RATE: 82 BPM | RESPIRATION RATE: 18 BRPM

## 2018-01-30 DIAGNOSIS — I46.9 CARDIAC ARREST, CAUSE UNSPECIFIED: ICD-10-CM

## 2018-01-30 DIAGNOSIS — Z98.890 OTHER SPECIFIED POSTPROCEDURAL STATES: Chronic | ICD-10-CM

## 2018-01-30 LAB
ALBUMIN SERPL ELPH-MCNC: 3 G/DL — LOW (ref 3.3–5)
ALBUMIN SERPL ELPH-MCNC: 3.3 G/DL — SIGNIFICANT CHANGE UP (ref 3.3–5)
ALP SERPL-CCNC: 109 U/L — SIGNIFICANT CHANGE UP (ref 40–120)
ALP SERPL-CCNC: 95 U/L — SIGNIFICANT CHANGE UP (ref 40–120)
ALT FLD-CCNC: 13 U/L RC — SIGNIFICANT CHANGE UP (ref 10–45)
ALT FLD-CCNC: 145 U/L RC — HIGH (ref 10–45)
ANION GAP SERPL CALC-SCNC: 18 MMOL/L — HIGH (ref 5–17)
APPEARANCE UR: ABNORMAL
APTT BLD: 48.6 SEC — HIGH (ref 27.5–37.4)
AST SERPL-CCNC: 16 U/L — SIGNIFICANT CHANGE UP (ref 10–40)
AST SERPL-CCNC: 287 U/L — HIGH (ref 10–40)
BASOPHILS # BLD AUTO: 0 K/UL — SIGNIFICANT CHANGE UP (ref 0–0.2)
BASOPHILS # BLD AUTO: 0 K/UL — SIGNIFICANT CHANGE UP (ref 0–0.2)
BASOPHILS NFR BLD AUTO: 0.4 % — SIGNIFICANT CHANGE UP (ref 0–2)
BILIRUB SERPL-MCNC: 0.4 MG/DL — SIGNIFICANT CHANGE UP (ref 0.2–1.2)
BILIRUB SERPL-MCNC: 0.6 MG/DL — SIGNIFICANT CHANGE UP (ref 0.2–1.2)
BILIRUB UR-MCNC: NEGATIVE — SIGNIFICANT CHANGE UP
BUN SERPL-MCNC: 24 MG/DL — HIGH (ref 7–23)
BUN SERPL-MCNC: 30 MG/DL — HIGH (ref 7–23)
CALCIUM SERPL-MCNC: 7.9 MG/DL — LOW (ref 8.4–10.5)
CALCIUM SERPL-MCNC: 9.3 MG/DL — SIGNIFICANT CHANGE UP (ref 8.4–10.5)
CHLORIDE SERPL-SCNC: 95 MMOL/L — LOW (ref 96–108)
CHLORIDE SERPL-SCNC: 96 MMOL/L — SIGNIFICANT CHANGE UP (ref 96–108)
CK MB BLD-MCNC: 2.4 % — SIGNIFICANT CHANGE UP (ref 0–3.5)
CK MB CFR SERPL CALC: 4.7 NG/ML — HIGH (ref 0–3.8)
CK SERPL-CCNC: 198 U/L — HIGH (ref 25–170)
CO2 SERPL-SCNC: 18 MMOL/L — LOW (ref 22–31)
CO2 SERPL-SCNC: 25 MMOL/L — SIGNIFICANT CHANGE UP (ref 22–31)
COLOR SPEC: YELLOW — SIGNIFICANT CHANGE UP
COMMENT - URINE: SIGNIFICANT CHANGE UP
CREAT SERPL-MCNC: 0.98 MG/DL — SIGNIFICANT CHANGE UP (ref 0.5–1.3)
CREAT SERPL-MCNC: 1.44 MG/DL — HIGH (ref 0.5–1.3)
DIFF PNL FLD: ABNORMAL
EOSINOPHIL # BLD AUTO: 0 K/UL — SIGNIFICANT CHANGE UP (ref 0–0.5)
EOSINOPHIL # BLD AUTO: 0.1 K/UL — SIGNIFICANT CHANGE UP (ref 0–0.5)
EOSINOPHIL NFR BLD AUTO: 1 % — SIGNIFICANT CHANGE UP (ref 0–6)
EPI CELLS # UR: SIGNIFICANT CHANGE UP /HPF
FLUAV H1 2009 PAND RNA SPEC QL NAA+PROBE: DETECTED
GAS PNL BLDA: SIGNIFICANT CHANGE UP
GAS PNL BLDV: SIGNIFICANT CHANGE UP
GAS PNL BLDV: SIGNIFICANT CHANGE UP
GLUCOSE BLDC GLUCOMTR-MCNC: 398 MG/DL — HIGH (ref 70–99)
GLUCOSE BLDC GLUCOMTR-MCNC: 431 MG/DL — HIGH (ref 70–99)
GLUCOSE SERPL-MCNC: 264 MG/DL — HIGH (ref 70–99)
GLUCOSE SERPL-MCNC: 473 MG/DL — CRITICAL HIGH (ref 70–99)
GLUCOSE UR QL: 50 MG/DL
HCT VFR BLD CALC: 36.2 % — SIGNIFICANT CHANGE UP (ref 34.5–45)
HCT VFR BLD CALC: 38.4 % — SIGNIFICANT CHANGE UP (ref 34.5–45)
HGB BLD-MCNC: 11.8 G/DL — SIGNIFICANT CHANGE UP (ref 11.5–15.5)
HGB BLD-MCNC: 13.1 G/DL — SIGNIFICANT CHANGE UP (ref 11.5–15.5)
INR BLD: 1.44 RATIO — HIGH (ref 0.88–1.16)
KETONES UR-MCNC: NEGATIVE — SIGNIFICANT CHANGE UP
LEUKOCYTE ESTERASE UR-ACNC: NEGATIVE — SIGNIFICANT CHANGE UP
LYMPHOCYTES # BLD AUTO: 0.6 K/UL — LOW (ref 1–3.3)
LYMPHOCYTES # BLD AUTO: 0.7 K/UL — LOW (ref 1–3.3)
LYMPHOCYTES # BLD AUTO: 2 % — LOW (ref 13–44)
LYMPHOCYTES # BLD AUTO: 8.9 % — LOW (ref 13–44)
MAGNESIUM SERPL-MCNC: 1.8 MG/DL — SIGNIFICANT CHANGE UP (ref 1.6–2.6)
MCHC RBC-ENTMCNC: 30.1 PG — SIGNIFICANT CHANGE UP (ref 27–34)
MCHC RBC-ENTMCNC: 30.9 PG — SIGNIFICANT CHANGE UP (ref 27–34)
MCHC RBC-ENTMCNC: 32.7 GM/DL — SIGNIFICANT CHANGE UP (ref 32–36)
MCHC RBC-ENTMCNC: 34.2 GM/DL — SIGNIFICANT CHANGE UP (ref 32–36)
MCV RBC AUTO: 90.4 FL — SIGNIFICANT CHANGE UP (ref 80–100)
MCV RBC AUTO: 92.1 FL — SIGNIFICANT CHANGE UP (ref 80–100)
METAMYELOCYTES # FLD: 1 % — HIGH (ref 0–0)
MONOCYTES # BLD AUTO: 0.5 K/UL — SIGNIFICANT CHANGE UP (ref 0–0.9)
MONOCYTES # BLD AUTO: 1 K/UL — HIGH (ref 0–0.9)
MONOCYTES NFR BLD AUTO: 14.7 % — HIGH (ref 2–14)
MONOCYTES NFR BLD AUTO: 4 % — SIGNIFICANT CHANGE UP (ref 2–14)
MYELOCYTES NFR BLD: 1 % — HIGH (ref 0–0)
NEUTROPHILS # BLD AUTO: 13.7 K/UL — HIGH (ref 1.8–7.4)
NEUTROPHILS # BLD AUTO: 5.2 K/UL — SIGNIFICANT CHANGE UP (ref 1.8–7.4)
NEUTROPHILS NFR BLD AUTO: 75.1 % — SIGNIFICANT CHANGE UP (ref 43–77)
NEUTROPHILS NFR BLD AUTO: 79 % — HIGH (ref 43–77)
NEUTS BAND # BLD: 13 % — HIGH (ref 0–8)
NITRITE UR-MCNC: NEGATIVE — SIGNIFICANT CHANGE UP
PH UR: 6 — SIGNIFICANT CHANGE UP (ref 5–8)
PHOSPHATE SERPL-MCNC: 11.5 MG/DL — HIGH (ref 2.5–4.5)
PLAT MORPH BLD: NORMAL — SIGNIFICANT CHANGE UP
PLATELET # BLD AUTO: 234 K/UL — SIGNIFICANT CHANGE UP (ref 150–400)
PLATELET # BLD AUTO: 253 K/UL — SIGNIFICANT CHANGE UP (ref 150–400)
POTASSIUM SERPL-MCNC: 4.3 MMOL/L — SIGNIFICANT CHANGE UP (ref 3.5–5.3)
POTASSIUM SERPL-MCNC: 5.4 MMOL/L — HIGH (ref 3.5–5.3)
POTASSIUM SERPL-SCNC: 4.3 MMOL/L — SIGNIFICANT CHANGE UP (ref 3.5–5.3)
POTASSIUM SERPL-SCNC: 5.4 MMOL/L — HIGH (ref 3.5–5.3)
PROT SERPL-MCNC: 6.8 G/DL — SIGNIFICANT CHANGE UP (ref 6–8.3)
PROT SERPL-MCNC: 7.6 G/DL — SIGNIFICANT CHANGE UP (ref 6–8.3)
PROT UR-MCNC: 150 MG/DL
PROTHROM AB SERPL-ACNC: 15.8 SEC — HIGH (ref 9.8–12.7)
RAPID RVP RESULT: DETECTED
RBC # BLD: 3.93 M/UL — SIGNIFICANT CHANGE UP (ref 3.8–5.2)
RBC # BLD: 4.24 M/UL — SIGNIFICANT CHANGE UP (ref 3.8–5.2)
RBC # FLD: 12.1 % — SIGNIFICANT CHANGE UP (ref 10.3–14.5)
RBC # FLD: 12.1 % — SIGNIFICANT CHANGE UP (ref 10.3–14.5)
RBC BLD AUTO: SIGNIFICANT CHANGE UP
RBC CASTS # UR COMP ASSIST: SIGNIFICANT CHANGE UP /HPF (ref 0–2)
SODIUM SERPL-SCNC: 132 MMOL/L — LOW (ref 135–145)
SODIUM SERPL-SCNC: 135 MMOL/L — SIGNIFICANT CHANGE UP (ref 135–145)
SP GR SPEC: 1.02 — SIGNIFICANT CHANGE UP (ref 1.01–1.02)
TROPONIN T SERPL-MCNC: 0.16 NG/ML — HIGH (ref 0–0.06)
UROBILINOGEN FLD QL: NEGATIVE — SIGNIFICANT CHANGE UP
WBC # BLD: 15.1 K/UL — HIGH (ref 3.8–10.5)
WBC # BLD: 6.9 K/UL — SIGNIFICANT CHANGE UP (ref 3.8–10.5)
WBC # FLD AUTO: 15.1 K/UL — HIGH (ref 3.8–10.5)
WBC # FLD AUTO: 6.9 K/UL — SIGNIFICANT CHANGE UP (ref 3.8–10.5)

## 2018-01-30 PROCEDURE — 71046 X-RAY EXAM CHEST 2 VIEWS: CPT | Mod: 26

## 2018-01-30 PROCEDURE — 99291 CRITICAL CARE FIRST HOUR: CPT | Mod: 25

## 2018-01-30 PROCEDURE — 71045 X-RAY EXAM CHEST 1 VIEW: CPT | Mod: 26,76,59

## 2018-01-30 PROCEDURE — 71045 X-RAY EXAM CHEST 1 VIEW: CPT | Mod: 26,77,59

## 2018-01-30 PROCEDURE — 92950 HEART/LUNG RESUSCITATION CPR: CPT

## 2018-01-30 PROCEDURE — 32556 INSERT CATH PLEURA W/O IMAGE: CPT | Mod: 77

## 2018-01-30 PROCEDURE — 31500 INSERT EMERGENCY AIRWAY: CPT

## 2018-01-30 PROCEDURE — 49082 ABD PARACENTESIS: CPT

## 2018-01-30 PROCEDURE — 32556 INSERT CATH PLEURA W/O IMAGE: CPT

## 2018-01-30 PROCEDURE — 36556 INSERT NON-TUNNEL CV CATH: CPT

## 2018-01-30 PROCEDURE — 99292 CRITICAL CARE ADDL 30 MIN: CPT | Mod: 25

## 2018-01-30 PROCEDURE — 74018 RADEX ABDOMEN 1 VIEW: CPT | Mod: 26,76,59

## 2018-01-30 RX ORDER — INSULIN GLARGINE 100 [IU]/ML
15 INJECTION, SOLUTION SUBCUTANEOUS AT BEDTIME
Qty: 0 | Refills: 0 | Status: DISCONTINUED | OUTPATIENT
Start: 2018-01-30 | End: 2018-01-31

## 2018-01-30 RX ORDER — NOREPINEPHRINE BITARTRATE/D5W 8 MG/250ML
0.01 PLASTIC BAG, INJECTION (ML) INTRAVENOUS
Qty: 8 | Refills: 0 | Status: DISCONTINUED | OUTPATIENT
Start: 2018-01-30 | End: 2018-01-30

## 2018-01-30 RX ORDER — SODIUM CHLORIDE 9 MG/ML
1000 INJECTION, SOLUTION INTRAVENOUS
Qty: 0 | Refills: 0 | Status: DISCONTINUED | OUTPATIENT
Start: 2018-01-30 | End: 2018-01-30

## 2018-01-30 RX ORDER — TIZANIDINE 4 MG/1
1 TABLET ORAL
Qty: 0 | Refills: 0 | COMMUNITY

## 2018-01-30 RX ORDER — METFORMIN HYDROCHLORIDE 850 MG/1
1 TABLET ORAL
Qty: 0 | Refills: 0 | COMMUNITY

## 2018-01-30 RX ORDER — IPRATROPIUM/ALBUTEROL SULFATE 18-103MCG
3 AEROSOL WITH ADAPTER (GRAM) INHALATION ONCE
Qty: 0 | Refills: 0 | Status: COMPLETED | OUTPATIENT
Start: 2018-01-30 | End: 2018-01-30

## 2018-01-30 RX ORDER — DEXTROSE 50 % IN WATER 50 %
12.5 SYRINGE (ML) INTRAVENOUS ONCE
Qty: 0 | Refills: 0 | Status: DISCONTINUED | OUTPATIENT
Start: 2018-01-30 | End: 2018-01-30

## 2018-01-30 RX ORDER — SODIUM CHLORIDE 9 MG/ML
500 INJECTION INTRAMUSCULAR; INTRAVENOUS; SUBCUTANEOUS ONCE
Qty: 0 | Refills: 0 | Status: COMPLETED | OUTPATIENT
Start: 2018-01-30 | End: 2018-01-30

## 2018-01-30 RX ORDER — DEXTROSE 50 % IN WATER 50 %
1 SYRINGE (ML) INTRAVENOUS ONCE
Qty: 0 | Refills: 0 | Status: DISCONTINUED | OUTPATIENT
Start: 2018-01-30 | End: 2018-01-31

## 2018-01-30 RX ORDER — PIPERACILLIN AND TAZOBACTAM 4; .5 G/20ML; G/20ML
3.38 INJECTION, POWDER, LYOPHILIZED, FOR SOLUTION INTRAVENOUS EVERY 8 HOURS
Qty: 0 | Refills: 0 | Status: DISCONTINUED | OUTPATIENT
Start: 2018-01-30 | End: 2018-01-30

## 2018-01-30 RX ORDER — MIDAZOLAM HYDROCHLORIDE 1 MG/ML
4 INJECTION, SOLUTION INTRAMUSCULAR; INTRAVENOUS
Qty: 0 | Refills: 0 | Status: DISCONTINUED | OUTPATIENT
Start: 2018-01-30 | End: 2018-01-30

## 2018-01-30 RX ORDER — INSULIN LISPRO 100/ML
VIAL (ML) SUBCUTANEOUS AT BEDTIME
Qty: 0 | Refills: 0 | Status: DISCONTINUED | OUTPATIENT
Start: 2018-01-30 | End: 2018-01-30

## 2018-01-30 RX ORDER — ALTEPLASE 100 MG
50 KIT INTRAVENOUS ONCE
Qty: 0 | Refills: 0 | Status: COMPLETED | OUTPATIENT
Start: 2018-01-30 | End: 2018-01-30

## 2018-01-30 RX ORDER — PIPERACILLIN AND TAZOBACTAM 4; .5 G/20ML; G/20ML
3.38 INJECTION, POWDER, LYOPHILIZED, FOR SOLUTION INTRAVENOUS EVERY 8 HOURS
Qty: 0 | Refills: 0 | Status: DISCONTINUED | OUTPATIENT
Start: 2018-01-30 | End: 2018-01-31

## 2018-01-30 RX ORDER — AZITHROMYCIN 500 MG/1
TABLET, FILM COATED ORAL
Qty: 0 | Refills: 0 | Status: DISCONTINUED | OUTPATIENT
Start: 2018-01-30 | End: 2018-01-31

## 2018-01-30 RX ORDER — DEXTROSE 50 % IN WATER 50 %
25 SYRINGE (ML) INTRAVENOUS ONCE
Qty: 0 | Refills: 0 | Status: DISCONTINUED | OUTPATIENT
Start: 2018-01-30 | End: 2018-01-31

## 2018-01-30 RX ORDER — NOREPINEPHRINE BITARTRATE/D5W 8 MG/250ML
0.01 PLASTIC BAG, INJECTION (ML) INTRAVENOUS
Qty: 8 | Refills: 0 | Status: DISCONTINUED | OUTPATIENT
Start: 2018-01-30 | End: 2018-02-02

## 2018-01-30 RX ORDER — INSULIN LISPRO 100/ML
VIAL (ML) SUBCUTANEOUS
Qty: 0 | Refills: 0 | Status: DISCONTINUED | OUTPATIENT
Start: 2018-01-30 | End: 2018-01-30

## 2018-01-30 RX ORDER — DEXTROSE 50 % IN WATER 50 %
25 SYRINGE (ML) INTRAVENOUS ONCE
Qty: 0 | Refills: 0 | Status: DISCONTINUED | OUTPATIENT
Start: 2018-01-30 | End: 2018-01-30

## 2018-01-30 RX ORDER — SODIUM CHLORIDE 9 MG/ML
1000 INJECTION INTRAMUSCULAR; INTRAVENOUS; SUBCUTANEOUS ONCE
Qty: 0 | Refills: 0 | Status: COMPLETED | OUTPATIENT
Start: 2018-01-30 | End: 2018-01-30

## 2018-01-30 RX ORDER — FENTANYL CITRATE 50 UG/ML
1 INJECTION INTRAVENOUS
Qty: 5000 | Refills: 0 | Status: DISCONTINUED | OUTPATIENT
Start: 2018-01-30 | End: 2018-01-30

## 2018-01-30 RX ORDER — FENTANYL CITRATE 50 UG/ML
0.94 INJECTION INTRAVENOUS
Qty: 5000 | Refills: 0 | Status: DISCONTINUED | OUTPATIENT
Start: 2018-01-30 | End: 2018-02-01

## 2018-01-30 RX ORDER — CHOLECALCIFEROL (VITAMIN D3) 125 MCG
1 CAPSULE ORAL
Qty: 0 | Refills: 0 | COMMUNITY

## 2018-01-30 RX ORDER — AZITHROMYCIN 500 MG/1
500 TABLET, FILM COATED ORAL EVERY 24 HOURS
Qty: 0 | Refills: 0 | Status: DISCONTINUED | OUTPATIENT
Start: 2018-01-31 | End: 2018-01-31

## 2018-01-30 RX ORDER — FENTANYL CITRATE 50 UG/ML
50 INJECTION INTRAVENOUS ONCE
Qty: 0 | Refills: 0 | Status: DISCONTINUED | OUTPATIENT
Start: 2018-01-30 | End: 2018-01-30

## 2018-01-30 RX ORDER — ACETAMINOPHEN 500 MG
650 TABLET ORAL ONCE
Qty: 0 | Refills: 0 | Status: COMPLETED | OUTPATIENT
Start: 2018-01-30 | End: 2018-01-30

## 2018-01-30 RX ORDER — GLUCAGON INJECTION, SOLUTION 0.5 MG/.1ML
1 INJECTION, SOLUTION SUBCUTANEOUS ONCE
Qty: 0 | Refills: 0 | Status: DISCONTINUED | OUTPATIENT
Start: 2018-01-30 | End: 2018-01-31

## 2018-01-30 RX ORDER — DEXTROSE 50 % IN WATER 50 %
12.5 SYRINGE (ML) INTRAVENOUS ONCE
Qty: 0 | Refills: 0 | Status: DISCONTINUED | OUTPATIENT
Start: 2018-01-30 | End: 2018-01-31

## 2018-01-30 RX ORDER — PREGABALIN 225 MG/1
1 CAPSULE ORAL
Qty: 0 | Refills: 0 | COMMUNITY

## 2018-01-30 RX ORDER — AZITHROMYCIN 500 MG/1
500 TABLET, FILM COATED ORAL ONCE
Qty: 0 | Refills: 0 | Status: COMPLETED | OUTPATIENT
Start: 2018-01-30 | End: 2018-01-30

## 2018-01-30 RX ORDER — AMLODIPINE BESYLATE 2.5 MG/1
1 TABLET ORAL
Qty: 0 | Refills: 0 | COMMUNITY

## 2018-01-30 RX ORDER — MIDAZOLAM HYDROCHLORIDE 1 MG/ML
4 INJECTION, SOLUTION INTRAMUSCULAR; INTRAVENOUS ONCE
Qty: 0 | Refills: 0 | Status: DISCONTINUED | OUTPATIENT
Start: 2018-01-30 | End: 2018-01-30

## 2018-01-30 RX ORDER — MIDAZOLAM HYDROCHLORIDE 1 MG/ML
8 INJECTION, SOLUTION INTRAMUSCULAR; INTRAVENOUS ONCE
Qty: 0 | Refills: 0 | Status: DISCONTINUED | OUTPATIENT
Start: 2018-01-30 | End: 2018-01-30

## 2018-01-30 RX ORDER — PANTOPRAZOLE SODIUM 20 MG/1
40 TABLET, DELAYED RELEASE ORAL DAILY
Qty: 0 | Refills: 0 | Status: DISCONTINUED | OUTPATIENT
Start: 2018-01-30 | End: 2018-01-31

## 2018-01-30 RX ORDER — GLUCAGON INJECTION, SOLUTION 0.5 MG/.1ML
1 INJECTION, SOLUTION SUBCUTANEOUS ONCE
Qty: 0 | Refills: 0 | Status: DISCONTINUED | OUTPATIENT
Start: 2018-01-30 | End: 2018-01-30

## 2018-01-30 RX ORDER — FENTANYL CITRATE 50 UG/ML
1 INJECTION INTRAVENOUS
Qty: 2500 | Refills: 0 | Status: DISCONTINUED | OUTPATIENT
Start: 2018-01-30 | End: 2018-01-30

## 2018-01-30 RX ORDER — DEXTROSE 50 % IN WATER 50 %
1 SYRINGE (ML) INTRAVENOUS ONCE
Qty: 0 | Refills: 0 | Status: DISCONTINUED | OUTPATIENT
Start: 2018-01-30 | End: 2018-01-30

## 2018-01-30 RX ORDER — FENTANYL CITRATE 50 UG/ML
100 INJECTION INTRAVENOUS ONCE
Qty: 0 | Refills: 0 | Status: DISCONTINUED | OUTPATIENT
Start: 2018-01-30 | End: 2018-01-30

## 2018-01-30 RX ORDER — INSULIN LISPRO 100/ML
VIAL (ML) SUBCUTANEOUS EVERY 6 HOURS
Qty: 0 | Refills: 0 | Status: DISCONTINUED | OUTPATIENT
Start: 2018-01-30 | End: 2018-01-31

## 2018-01-30 RX ORDER — PIPERACILLIN AND TAZOBACTAM 4; .5 G/20ML; G/20ML
3.38 INJECTION, POWDER, LYOPHILIZED, FOR SOLUTION INTRAVENOUS ONCE
Qty: 0 | Refills: 0 | Status: COMPLETED | OUTPATIENT
Start: 2018-01-30 | End: 2018-01-30

## 2018-01-30 RX ORDER — IPRATROPIUM/ALBUTEROL SULFATE 18-103MCG
3 AEROSOL WITH ADAPTER (GRAM) INHALATION EVERY 6 HOURS
Qty: 0 | Refills: 0 | Status: DISCONTINUED | OUTPATIENT
Start: 2018-01-30 | End: 2018-02-19

## 2018-01-30 RX ORDER — SODIUM CHLORIDE 9 MG/ML
1000 INJECTION, SOLUTION INTRAVENOUS
Qty: 0 | Refills: 0 | Status: DISCONTINUED | OUTPATIENT
Start: 2018-01-30 | End: 2018-01-31

## 2018-01-30 RX ORDER — CARVEDILOL PHOSPHATE 80 MG/1
1 CAPSULE, EXTENDED RELEASE ORAL
Qty: 0 | Refills: 0 | COMMUNITY

## 2018-01-30 RX ORDER — PIPERACILLIN AND TAZOBACTAM 4; .5 G/20ML; G/20ML
3.38 INJECTION, POWDER, LYOPHILIZED, FOR SOLUTION INTRAVENOUS ONCE
Qty: 0 | Refills: 0 | Status: DISCONTINUED | OUTPATIENT
Start: 2018-01-30 | End: 2018-01-30

## 2018-01-30 RX ORDER — CEFTRIAXONE 500 MG/1
1 INJECTION, POWDER, FOR SOLUTION INTRAMUSCULAR; INTRAVENOUS ONCE
Qty: 0 | Refills: 0 | Status: COMPLETED | OUTPATIENT
Start: 2018-01-30 | End: 2018-01-30

## 2018-01-30 RX ORDER — HYDROCORTISONE 20 MG
50 TABLET ORAL EVERY 6 HOURS
Qty: 0 | Refills: 0 | Status: DISCONTINUED | OUTPATIENT
Start: 2018-01-30 | End: 2018-01-31

## 2018-01-30 RX ORDER — CALCIUM GLUCONATE 100 MG/ML
2 VIAL (ML) INTRAVENOUS ONCE
Qty: 0 | Refills: 0 | Status: COMPLETED | OUTPATIENT
Start: 2018-01-30 | End: 2018-01-30

## 2018-01-30 RX ORDER — CEFTRIAXONE 500 MG/1
1 INJECTION, POWDER, FOR SOLUTION INTRAMUSCULAR; INTRAVENOUS ONCE
Qty: 0 | Refills: 0 | Status: DISCONTINUED | OUTPATIENT
Start: 2018-01-30 | End: 2018-01-30

## 2018-01-30 RX ORDER — MIDAZOLAM HYDROCHLORIDE 1 MG/ML
2 INJECTION, SOLUTION INTRAMUSCULAR; INTRAVENOUS
Qty: 0 | Refills: 0 | Status: DISCONTINUED | OUTPATIENT
Start: 2018-01-30 | End: 2018-02-01

## 2018-01-30 RX ADMIN — Medication 1 MILLIGRAM(S): at 23:54

## 2018-01-30 RX ADMIN — FENTANYL CITRATE 3.5 MICROGRAM(S)/KG/HR: 50 INJECTION INTRAVENOUS at 16:19

## 2018-01-30 RX ADMIN — SODIUM CHLORIDE 1000 MILLILITER(S): 9 INJECTION INTRAMUSCULAR; INTRAVENOUS; SUBCUTANEOUS at 12:18

## 2018-01-30 RX ADMIN — SODIUM CHLORIDE 1500 MILLILITER(S): 9 INJECTION INTRAMUSCULAR; INTRAVENOUS; SUBCUTANEOUS at 23:30

## 2018-01-30 RX ADMIN — AZITHROMYCIN 250 MILLIGRAM(S): 500 TABLET, FILM COATED ORAL at 21:12

## 2018-01-30 RX ADMIN — Medication 3 MILLILITER(S): at 15:09

## 2018-01-30 RX ADMIN — Medication 400 GRAM(S): at 23:30

## 2018-01-30 RX ADMIN — FENTANYL CITRATE 100 MICROGRAM(S): 50 INJECTION INTRAVENOUS at 17:15

## 2018-01-30 RX ADMIN — Medication 3 MILLILITER(S): at 14:55

## 2018-01-30 RX ADMIN — FENTANYL CITRATE 3.5 MICROGRAM(S)/KG/HR: 50 INJECTION INTRAVENOUS at 21:12

## 2018-01-30 RX ADMIN — PIPERACILLIN AND TAZOBACTAM 200 GRAM(S): 4; .5 INJECTION, POWDER, LYOPHILIZED, FOR SOLUTION INTRAVENOUS at 22:11

## 2018-01-30 RX ADMIN — Medication 1.31 MICROGRAM(S)/KG/MIN: at 18:23

## 2018-01-30 RX ADMIN — Medication 1.31 MICROGRAM(S)/KG/MIN: at 21:12

## 2018-01-30 RX ADMIN — FENTANYL CITRATE 50 MICROGRAM(S): 50 INJECTION INTRAVENOUS at 17:00

## 2018-01-30 RX ADMIN — FENTANYL CITRATE 100 MICROGRAM(S): 50 INJECTION INTRAVENOUS at 14:48

## 2018-01-30 RX ADMIN — CEFTRIAXONE 100 GRAM(S): 500 INJECTION, POWDER, FOR SOLUTION INTRAMUSCULAR; INTRAVENOUS at 15:13

## 2018-01-30 RX ADMIN — SODIUM CHLORIDE 1000 MILLILITER(S): 9 INJECTION INTRAMUSCULAR; INTRAVENOUS; SUBCUTANEOUS at 17:25

## 2018-01-30 RX ADMIN — ALTEPLASE 50 MILLIGRAM(S): KIT at 15:45

## 2018-01-30 RX ADMIN — Medication 50 MILLIGRAM(S): at 23:31

## 2018-01-30 RX ADMIN — MIDAZOLAM HYDROCHLORIDE 4 MILLIGRAM(S): 1 INJECTION, SOLUTION INTRAMUSCULAR; INTRAVENOUS at 17:05

## 2018-01-30 RX ADMIN — INSULIN GLARGINE 15 UNIT(S): 100 INJECTION, SOLUTION SUBCUTANEOUS at 22:28

## 2018-01-30 RX ADMIN — FENTANYL CITRATE 100 MICROGRAM(S): 50 INJECTION INTRAVENOUS at 14:32

## 2018-01-30 RX ADMIN — ALTEPLASE 600 MILLIGRAM(S): KIT at 15:34

## 2018-01-30 RX ADMIN — Medication 4 MILLIGRAM(S): at 19:45

## 2018-01-30 RX ADMIN — Medication 12: at 22:21

## 2018-01-30 NOTE — ED PROCEDURE NOTE - NS_EDPROVIDERDISPOUSERTYPE_ED_A_ED
Attending Attestation (For Attendings USE Only)...

## 2018-01-30 NOTE — ED ADULT NURSE REASSESSMENT NOTE - NS ED NURSE REASSESS COMMENT FT1
Pt complaining of difficulty breathing, MD resident and attending notified immediately, pt placed on non-rebreather 100%

## 2018-01-30 NOTE — H&P ADULT - ATTENDING COMMENTS
67F Hx Bilateral Breast CA resection and on Herceptin (last dose Jan 20th) presented with URI symptoms last 24 hr found to be IFA positive, went into PEA arrest x 2 with ROSC x 2 (10 min + 30 min) and Epi  x 7 doses, complicated by bilateral pneumothoraces, extensive subcutaneous emphysema, cardiogenic shock, s/p 3 chest tubes and 1 peritoneal drain (paracentesis decompression of the abdomen), 100 mg of empiric TPA given at 15:30 hr in ED now on ventilator support, pressor support, close cardiopulmonary monitoring pending reassessment.   - Repeat CXR, Abdominal Xray, ABG, EKG, cardiac enzyme, LA and CBC.  - Empiric ABx and Tamiflu   - Sedation and pain control plan  - Intravesical pressure measurement R/O IAH     Critical Care Time = 45 min

## 2018-01-30 NOTE — H&P ADULT - HISTORY OF PRESENT ILLNESS
68y/o F hx Breast Cancer (diagnosed in Feb 2017) currently on Herceptin (last dose Jan 20th), HTN, Diabetes 68y/o F hx Breast Cancer (diagnosed in Feb 2017) currently on Herceptin (last dose Jan 20th), HTN, Diabetes on 70/30, presenting with Fever at home, tmax of 102 yesterday, with whole body weakness. Patient also had decreased appetite yesterday. Patient's daughter states that she herself felt unwell and was going to bring both herself and her mother to the doctor's office today. However, given pt had worsening weakness, daughter brought the patient to the ER. Patient is otherwise noted to be independent and lives at home with her . She is AOx4 at baseline.     In the ER, patient was found to have T max of 100.6, with initial vitals of 99.5, HR 82, /78, RR 18, satting 93% on room air. She was given ceftriaxone x1, and given 2L normal saline bolus with Duoneb x 3. Patient was also found to be RVP positive for flu. Patient then went into PEA arrest with chest compressions for 10min, received Rosc after Epi x 2, and was intubated. Patient required re-intubation (improperly intubated initially into the esophagus) after she PEA arrested again, with ROSC achieved after 30min with Epi x 5. Patient was also found to have bilateral pneumothoraces, s/p 3 chest tubes, 2 on the right side, and 1 on the left. Patient also has paracentesis drain for decompression of the abdomen.     Most recent vitals are BP 96/61, , RR 34, O2 saturation 95% on Fentanyl infusion, and levophed 0.05.

## 2018-01-30 NOTE — ED ADULT NURSE REASSESSMENT NOTE - NS ED NURSE REASSESS COMMENT FT1
received report from Windy Vences RN during transport of patient from [facility] to Critical Care room D.  patient in PEA, continuing compressions with Shan device. 1 epi given (1529). Pulse check at 1533, no pulse remains in PEA, continuing compressions, epi given (1533).   TPA push 50 mg by MD at 1534.   1536 pulse check, no pulse remains in PEA. 1 epi given. CTU called for consult. 1542 no pulse.   1545 second bolus of TPA 50 mg push by MD   1545 epi push given. 1548, no pulse during pulse check. 1550 1 d50, bicarb and 1 epi given.   1552 pulse check, bounding pulse felt in carotid.

## 2018-01-30 NOTE — ED PROCEDURE NOTE - NS ED ATTENDING STATEMENT MOD
Attending Only
Attending Only
I have personally seen and examined this patient.  I have fully participated in the care of this patient. I have reviewed all pertinent clinical information, including history, physical exam, plan and the Resident’s note and agree except as noted.
I have personally seen and examined this patient.  I have fully participated in the care of this patient. I have reviewed all pertinent clinical information, including history, physical exam, plan and the Resident’s note and agree except as noted.
Attending Only

## 2018-01-30 NOTE — ED PROVIDER NOTE - CRITICAL CARE PROVIDED
interpretation of diagnostic studies/additional history taking/consultation with other physicians/direct patient care (not related to procedure)/documentation/consult w/ pt's family directly relating to pts condition/conducted a detailed discussion of DNR status/telephone consultation with the patient's family

## 2018-01-30 NOTE — ED PROCEDURE NOTE - NS ED PROCEUDURE1 POST INTUBATION REVIEW
Positive end tidal Co2 noted/High capnography by positive wavewform which improved/Appropriate capnography/Breath sounds bilateral/Chest X-Ray

## 2018-01-30 NOTE — ED PROCEDURE NOTE - CPROC ED POST RADIOGRAPHY1
post-procedure radiography performed/chest tube in correct position
post-procedure radiography performed
improved but not resolved pneumothorax/post-procedure radiography performed/positive pneumothorax/chest tube in correct position
subcutaneous placement/post-procedure radiography performed

## 2018-01-30 NOTE — ED PROCEDURE NOTE - PROCEDURE ADDITIONAL DETAILS
Loss of volume from ventillator. Initial tracheal tube exchanged over tube exchange catheter. No immediate complications. pt tolerated procedure well.
Catheter introduced. Air aspirated. >500 cc of air aspirated and squeezed from stomach. Sutured into placed. Connected to vacuum bottle. Catheter left in place, sutured with vacuum bottle. Left in place at recommendation of ICU team.
Pigtail catheter placement attempted on left side 5th intercostal space. Air was aspirated at a position that seemed too superficial to be in the pleural space but an attempt to place the catheter was made. X-ray confirmed subcutaneous positioning in a pocket of subcutaneous air. The pigtail was removed. A second, left sided anterior placement was performed by the attending (see separate procedure note).
Anterior approach at 2nd intercostal space of pigtail catheter with care taken to avoid placement over the mediport. Sterile technique used. Air aspirated. Tube secured. Immediate rush of air. No immediate complications.
emergent placement right 2nd intercostal space for decompression of tension pneumothorax. Immediate rush of air and aspiration of large amount of air after connection to pleurovac. No blood.
emergent pigtail placement right chest
Pt in immediate hypercarbic and hypoxic respiratory failure. Not RSI candidate due to low sat. Was given BVM ventillation to 100% saturation and then medication administered. Immediately recognized esophageal intubation by resident. 2nd attempt by attending achieved grade 1 view with easy placement of ETT. No resistance or trauma appreciated. Orogastric tube placed immediately afterwards with output of gastric contents and decompressing stomach.

## 2018-01-30 NOTE — ED ADULT NURSE REASSESSMENT NOTE - NS ED NURSE REASSESS COMMENT FT1
1442 Pt desaturated tp 89% MD aware and at bedside   1443 50mg of succ given  1444 Pt reintubated, ETT 7.5 PEEP 6, Rate 16  Fio2 60%

## 2018-01-30 NOTE — ED PROCEDURE NOTE - NS ED PROCEDURE ASSISTED BY
The procedure was performed independently
The procedure was performed independently
Supervision was available
Supervision was available
The procedure was performed independently

## 2018-01-30 NOTE — ED PROCEDURE NOTE - CPROC ED INFORMED CONSENT1
This was an emergent procedure and consent was implied.

## 2018-01-30 NOTE — ED PROCEDURE NOTE - CPROC ED ANATOMIC LOCATION1
intercostal space
right second intercostal space
left anterior 2nd intercostal space
left lateral 5th intercostal space

## 2018-01-30 NOTE — ED PROVIDER NOTE - MEDICAL DECISION MAKING DETAILS
MDM: 66yo F with hx breast ca s/p mastectomy presents with weakness/cough/fever. likely viral given similar sxs with family member. ddx include pnia based on lung exam / hypoxia. will get CXR, UA, CBC, CMP, VBG, will give 1L bolus fluids. MDM: 66yo F with hx breast ca s/p mastectomy presents with weakness/cough/fever. likely viral given similar sxs with family member. ddx include pnia based on lung exam / hypoxia. will get CXR, UA, CBC, CMP, VBG, will give 1L bolus fluids.    ATTENDING MD Ron: well appearing elderly female with Hx of breast CA with diffuse aches, nonproductive cough, fever. other than fever normal vitals at traige and in room, no evidence of sepsis. clinically consistent with influenza, r/o pneumonia. no focal swelling, pleurisy, hemoptysis or other signs of DVT that would concern for PE with loreto likely symptoms. pt stable and well appearing, but may require admission due to age / frailty. not immunosuppressed only on hormonal agents at this time.

## 2018-01-30 NOTE — ED PROVIDER NOTE - PHYSICAL EXAMINATION
Gen: obese female lying in mild distress, speaking in complete sentences  Head: NCAT  HEENT: PERRL, MMM, normal conjunctiva, anicteric, neck supple  Lung: crackles L lower lung fields  CV: RRR, no murmurs, rubs or gallops  Abd: soft, NTND, no rebound or guarding, no CVAT  MSK: No edema, no visible deformities  Neuro: No focal neurologic deficits. CN II-XII grossly intact. 5/5 strength and normal sensation in all extremities.  Skin: Warm and dry, no evidence of rash  Psych: normal mood and affect Gen: obese female lying in mild distress, speaking in complete sentences  Head: NCAT  HEENT: PERRL, MMM, normal conjunctiva, anicteric, neck supple  Lung: crackles L lower lung fields  CV: RRR, no murmurs, rubs or gallops  Abd: soft, NTND, no rebound or guarding, no CVAT  MSK: No edema, no visible deformities  Neuro: No focal neurologic deficits. CN II-XII grossly intact. 5/5 strength and normal sensation in all extremities.  Skin: Warm and dry, no evidence of rash  Psych: normal mood and affect    ATTENDING MD Ron: GEN: mildly ill appearing, VITALS: febrile, otherwise WNL, HEENT: NCAT, eyes clear, mucus membranes are slightly dry, oropharynx with cobblestoning and erythema, +LA; RESP: bibasilar crackles L>R, no wheeizng, no respiratory distress, no accessory muscle use; CV: normal rate, regular rhythm, S1, S2, no murmur, 2+ distal pulses; ABD: the abdomen is soft, nontender, and nondistended, there are no palpable masses or organomegaly; : no CVAT; SKIN: warm, dry, no rash; NEURO: normal mentation, normal facial symmetry, moving all extremities, sensation grossly intact, motor grossly intact

## 2018-01-30 NOTE — ED ADULT NURSE REASSESSMENT NOTE - NS ED NURSE REASSESS COMMENT FT1
PEA, MD present CPR initiated please see cardiac arrest flow sheet.     pulse RETURNED AT 1523  1526 Pt transferred to Critical Care D. Pt in witnessed  PEA at 15:17 MD at bedside, CPR initiated see cardiac arrest flow sheet.   pulse RETURNED AT 1523  1526 Pt transferred to Critical Care D on lucas devic with pads in place. Pt in witnessed  PEA at 15:17 MD at bedside, CPR initiated see cardiac arrest flow sheet.   pulse RETURNED AT 1523  1526 Pt transferred to Critical Care D on andie device with pads in place.

## 2018-01-30 NOTE — ED ADULT NURSE REASSESSMENT NOTE - NS ED NURSE REASSESS COMMENT FT1
50 mcg push of fentanyl for sedation, patient began to wake up. 100% oxygen saturation.   lateral and anterior chest tube placed. 50 mcg push of fentanyl for sedation, patient began to wake up. 100% oxygen saturation.   lateral and anterior chest tube placement in progress.

## 2018-01-30 NOTE — CONSULT NOTE ADULT - SUBJECTIVE AND OBJECTIVE BOX
General Surgery Consult  Consulting surgical team: [facility] [id number]  Consulting attending: Dr. Orellana    HPI: 68y/o F hx Breast Cancer (diagnosed in 2017) currently on Herceptin (last dose ), HTN, Diabetes presented with Fever at home, tmax of 102 yesterday, with whole body weakness. Patient found to be flu +, had PEA arrest x 2 in ED s/p bilateral chest tubes (two on right and one on left) for tension pneumothoraces s/p CPR, and pneumoperitoneum s/p right peritoneal pigtail catheter placement by ED. Patient was intubated before second PEA arrest. Also received TPA for presumed massive PE. Patient admitted to medicine and brought to MICU, on pressors. Surgery now consulted for pneumoperitoneum.      PAST MEDICAL HISTORY:  Diabetes  Breast CA      PAST SURGICAL HISTORY:  H/O mastectomy, bilateral  No significant past surgical history      MEDICATIONS:  ALBUTerol/ipratropium for Nebulization 3 milliLiter(s) Nebulizer every 6 hours  azithromycin  IVPB      calcium gluconate IVPB 2 Gram(s) IV Intermittent once  dextrose 5%. 1000 milliLiter(s) IV Continuous <Continuous>  dextrose 50% Injectable 12.5 Gram(s) IV Push once  dextrose 50% Injectable 25 Gram(s) IV Push once  dextrose 50% Injectable 25 Gram(s) IV Push once  dextrose Gel 1 Dose(s) Oral once PRN  fentaNYL   Infusion 0.936 MICROgram(s)/kG/Hr IV Continuous <Continuous>  glucagon  Injectable 1 milliGRAM(s) IntraMuscular once PRN  hydrocortisone sodium succinate Injectable 50 milliGRAM(s) IV Push every 6 hours  insulin glargine Injectable (LANTUS) 15 Unit(s) SubCutaneous at bedtime  insulin lispro (HumaLOG) corrective regimen sliding scale   SubCutaneous every 6 hours  LORazepam   Injectable 4 milliGRAM(s) IV Push once PRN  midazolam Injectable 2 milliGRAM(s) IV Push every 2 hours PRN  norepinephrine Infusion 0.01 MICROgram(s)/kG/Min IV Continuous <Continuous>  pantoprazole  Injectable 40 milliGRAM(s) IV Push daily  piperacillin/tazobactam IVPB. 3.375 Gram(s) IV Intermittent every 8 hours      ALLERGIES:  NIFEdipine (Urticaria; Hives)  vitamin E (Short breath; Urticaria; Hives)      VITALS & I/Os:  Vital Signs Last 24 Hrs  T(C): 37.1 (2018 20:00), Max: 38.1 (2018 11:20)  T(F): 98.7 (2018 20:00), Max: 100.6 (2018 11:20)  HR: 96 (2018 20:34) (78 - 175)  BP: 101/54 (2018 20:15) (48/16 - 213/101)  BP(mean): 75 (2018 20:15) (52 - 112)  RR: 20 (2018 20:15) (13 - 36)  SpO2: 99% (2018 20:34) (81% - 100%)  Mode: AC/ CMV (Assist Control/ Continuous Mandatory Ventilation)  RR (machine): 18  TV (machine): 400  FiO2: 40  PEEP: 5  ITime: 1  MAP: 11  PIP: 36    I&O's Summary    2018 07:01  -  2018 23:25  --------------------------------------------------------  IN: 177.6 mL / OUT: 100 mL / NET: 77.6 mL        PHYSICAL EXAM:  General: No acute distress  Respiratory: intubated, ventilated. Bilateral chest tubes 2 on right and 1 on left, serosanguinous drainage  Cardiovascular: Regular rate and rhythm.   Abdominal: Soft, distended. Peritoneal drain (pigtail) in right hemiabdomen connected to vaccuum   Extremities: Warm    LABS:                        11.8   15.1  )-----------( 253      ( 2018 19:14 )             36.2         132<L>  |  96  |  30<H>  ----------------------------<  473<HH>  5.4<H>   |  18<L>  |  1.44<H>    Ca    7.9<L>      2018 19:25  Phos  11.5       Mg     1.8         TPro  6.8  /  Alb  3.0<L>  /  TBili  0.6  /  DBili  x   /  AST  287<H>  /  ALT  145<H>  /  AlkPhos  109      Lactate:   @ 12:06  2.2    PT/INR - ( 2018 19:14 )   PT: 15.8 sec;   INR: 1.44 ratio         PTT - ( 2018 19:14 )  PTT:48.6 sec  ABG - ( 2018 19:05 )  pH: 7.23  /  pCO2: 48    /  pO2: 115   / HCO3: 20    / Base Excess: -7.6  /  SaO2: 98          CARDIAC MARKERS ( 2018 19:25 )  x     / 0.16 ng/mL / 198 U/L / x     / 4.7 ng/mL        Urinalysis Basic - ( 2018 12:52 )    Color: Yellow / Appearance: SL Turbid / S.025 / pH: x  Gluc: x / Ketone: Negative  / Bili: Negative / Urobili: Negative   Blood: x / Protein: 150 mg/dL / Nitrite: Negative   Leuk Esterase: Negative / RBC: 3-5 /HPF / WBC x   Sq Epi: x / Non Sq Epi: OCC /HPF / Bacteria: x        IMAGING:  Xray Chest 1 View- PORTABLE-Urgent (18 @ 19:58) >  INTERPRETATION:  lucency under the right hemidiaphragm concerning for   free air.  Doscussed with Dr. Hughes.  Barbara Evans

## 2018-01-30 NOTE — PROCEDURE NOTE - NSPROCDETAILS_GEN_ALL_CORE
lumen(s) aspirated and flushed/ultrasound guidance/guidewire recovered/sterile dressing applied/sterile technique, catheter placed

## 2018-01-30 NOTE — H&P ADULT - NSHPPHYSICALEXAM_GEN_ALL_CORE
PHYSICAL EXAM:  GENERAL: Intubated, obtunded  HEAD:  Atraumatic, Normocephalic  EYES: Right pupil pinpoint, left pupil dilated, non-reactive bilaterally   CHEST/LUNG: Course breath sounds, intubated, good airway entry bilaterally, chest tubes in place, 2 on right, 1 on left, left mediport  HEART: Tachycardia, no murmurs   ABDOMEN: Distended, with paracentesis drain,   NERVOUS SYSTEM:  Alert & Oriented x0, No Purposeful movements, not responding to commands on sedation

## 2018-01-30 NOTE — ED PROVIDER NOTE - PROGRESS NOTE DETAILS
Pt previously satting 100% on 2L NC. When attempting to adjust the IV line on L hand, pt started having acute SOB. Pt thrashing in bed, unable to breathe; satting at 85%, becoming more agitated and altered by the minute. Resident called, attending called; Pt found to be profoundly altered at sats of 60s. given her inability to protect airway and rapidly declining sats, intubation/CPR was discussed with family and family agreed that they wanted to have "everything done". Intubation was attempted by resident, had color change but found to be in esophagus; pulled immediately and intubation by attending in esophagus. sedation started, pt stable, CXR ordered. Pt was stable intubated with propofol and fentanyl. MICU called for eval/admission. During this time, nursing staff found her to be more pale and checked pulse - did not find pulse. CPR was initiated immediately. After epi and CPR for 5 min, she was found to have ROSC and was transported to Trauma D for better resuscitation efforts. Family found to be attempting to take voice recordings and video recordings at bedside. in the trauma bay, she was found to be pulseless again, CPR initiated again and SAMIRA device was used. After 30min of resuscitation and ~10 rounds epi, tPA x2, bilateral needle decompressions, she was found to be in ROSC. CXR was ordered. During CPR efforts and resuscitation efforts, pt was found to have R sided tension PTX, PTX on L side as well as diffuse subcutaneous air in breast tissue and also in the abdominal soft tissue. multiple pigtails were inserted in the chest for decompression. 1 paracentesis kit was also used to decompress the distended abdomen with subcutaneous air. Pt stable in trauma D with multiple chest tubes, ETT in place on the ventilator; pt family counseled by ER resident and was seen by the MICU team for admission. Pt previously satting 100% on 2L NC. When attempting to adjust the IV line on L hand, pt started having acute SOB. Pt thrashing in bed, unable to breathe; satting at 85%, becoming more agitated and altered by the minute. Resident called, attending called; Pt found to be profoundly altered at sats of 60s and dropping. given her inability to protect airway and rapidly declining sats, intubation/CPR was discussed with family and family agreed that they wanted to have "everything done". Intubation was attempted by resident, had color change but found to be in esophagus; pulled immediately and intubation by attending in esophagus. sedation started, pt stable, CXR ordered.    Shortly afterwords pt desatted rapidly. Vent loosing half the volume. CXR and end-tidal waveform CO2 confirming placement. Suspect baloon ruptured. Tube exchanged. Stable on vent, no volume loss. Pt was stable intubated with propofol and fentanyl. MICU called for eval/admission. Pt rapidly desaturated again and became tachycardic to the 150s. Ventillator appearing to function at this time. Pt disconnected, no air trapping. Bagging easily. Given hx of cancer and leg pain PE considered, CTA ordered. During this time, nursing staff found her to be more pale and checked pulse - did not find pulse. CPR was initiated immediately. After epi and CPR for 5 min, she was found to have ROSC and was transported to Trauma D for better resuscitation efforts. Family found to be attempting to take voice recordings and video recordings at bedside. in the trauma bay, she was found to be pulseless again, CPR initiated again and SAMIRA device was used. Presumed massive PE given easy bagging, sudden and rapid desaturation and tachycardia without other clear explanation. After 30min of resuscitation and ~10 rounds epi, tPA x100mg, bilateral needle decompressions (large rush of air on the left, no rush of air on the right), she was found to be in ROSC. CXR was ordered. ECMO team also consulted during this period, feel pt was not ECMO candidate During CPR efforts and resuscitation efforts, pt was found to have R sided tension PTX, PTX on L side as well as diffuse subcutaneous air in breast tissue and also in the abdominal soft tissue. multiple pigtails were inserted in the chest for decompression. 1 paracentesis kit was also used to decompress the distended abdomen with subcutaneous air. ED ICU attendings and CCU/ECMO team also concerned for pneumoperitoneum recommended decompression. Dr. Mejias ED ICU attending recommending leaving paracentesis catheter in place for ICU. Sutured in place. Pt requiring pressors for septic vs. cardiogenic shock. Pt stable in trauma D with multiple chest tubes, ETT in place on the ventilator; pt family counseled by ER resident and was seen by the MICU team for admission. CTA and CT abd/pelvis ordered. Pt showed some signs of alertness after CP (opened eyes and looked at providers) but not responsive to voice or purposefull movements. pt appeared in pain, sedation used.

## 2018-01-30 NOTE — ED PROVIDER NOTE - CARE PLAN
Principal Discharge DX:	Cardiac arrest  Secondary Diagnosis:	Pneumothorax  Secondary Diagnosis:	ARDS (adult respiratory distress syndrome)

## 2018-01-30 NOTE — ED ADULT NURSE REASSESSMENT NOTE - NS ED NURSE REASSESS COMMENT FT1
4 mg versed push for sedation, pt opening eyes.   chest tubes (lateral and anterior ) placement complete.   second liter of normal saline hung for blood pressure.

## 2018-01-30 NOTE — ED ADULT NURSE REASSESSMENT NOTE - NS ED NURSE REASSESS COMMENT FT1
1609 norepi began at 0.01 mcg.   bilateral chest tubes placed with improvement of oxygen saturation. One paracentesis in the right abdomen. abdominal decompression in progress. 1609 Norepi began at 0.01 mcg.   bilateral chest tubes placed with improvement of oxygen saturation, suction attached to drain.   One paracentesis in the right abdomen. abdominal decompression in progress. pleura vac attached.   Norepi titrated to .05 mcg for blood pressure management.

## 2018-01-30 NOTE — ED PROVIDER NOTE - ATTENDING CONTRIBUTION TO CARE
ATTENDING MD:  I, Andrew Velasquez, personally have seen and examined this patient.  I have discussed all aspects of care with the resident physician. Resident note reviewed and agree on plan of care and except where noted.  See HPI, PE, and MDM for details. Progress notes and details edited.

## 2018-01-30 NOTE — ED ADULT NURSE NOTE - OBJECTIVE STATEMENT
67 year old female presents to the ED via EMS complaining of weakness, fatigue, and fever. Pt took Tylenol at 0800AM this morning and as per family was 100.2 earlier. Pt had loose bowel movement on arrival, incontinence care provided, pt changed into gown, SKIN CDI, IV placed. Pt is A&O x 4, VSS, ambulates independently at home. Pt denies, NVD, SOB, or chest pain. IV placed, labs drawn, bed in low position, safety measures in place. EKG completed and given to attending. pr comfortable in appearance, daughter at bedside. 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 Pt sating 92% on RA, placed on 3L O2 and sating 98%. Pt has a Port in place. Pt has Hx of double mastectomy and chemo for breast CA in September 2017, not actively under treatment, PORT NOT ACCESSED in ED. Hx of DM is on insulin. 67 year old female presents to the ED via EMS complaining of weakness, fatigue, and fever. Pt took Tylenol at 0800AM this morning and as per family was 100.2 earlier. Pt had loose bowel movement on arrival, incontinence care provided, pt changed into gown, SKIN CDI, IV placed. Pt is A&O x 4, VSS, ambulates independently at home. Pt denies, NVD, SOB, or chest pain. IV placed, labs drawn, bed in low position, safety measures in place. EKG completed and given to attending. pr comfortable in appearance, daughter at bedside. 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 Pt sating 92% on RA, placed on 3L O2 and sating 98%. Pt has a Port in place. Pt has Hx of double mastectomy and chemo for breast CA in September 2017, not actively under treatment, PORT NOT ACCESSED in ED. Hx of DM is on insulin. Pt had wisdom teeth removed 1 week ago. 67 year old female presents to the ED via EMS complaining of weakness, fatigue, and fever. Pt took Tylenol at 0800AM this morning and as per family was 100.2 earlier. Pt had loose bowel movement on arrival, incontinence care provided, pt changed into gown, SKIN CDI, IV placed. Pt is A&O x 4, VSS, ambulates independently at home. Pt denies, NVD, SOB, or chest pain. IV placed, labs drawn, bed in low position, safety measures in place. EKG completed and given to attending. pt comfortable in appearance, daughter at bedside. 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 Pt sating 92% on RA, placed on 3L O2 and sating 98%. Pt has a Port in place. Pt has Hx of double mastectomy and chemo for breast CA in September 2017, Pt on heptin, PORT NOT ACCESSED in ED. Hx of DM is on insulin. Pt had wisdom teeth removed 1 week ago.

## 2018-01-30 NOTE — ED ADULT NURSE REASSESSMENT NOTE - NS ED NURSE REASSESS COMMENT FT1
1442 Pt abdomen distended and sating 89-90% on ventilator, MD made aware immediately. respiratory at bedside   1443 50 of succs given, ot extubated and placed on Ambu bag   1444 ETT tube placed, pt reintubated - Positive color change, bilateral breath sounds. PEEP 6, rate 16  FiO2 60%

## 2018-01-30 NOTE — H&P ADULT - NSHPLABSRESULTS_GEN_ALL_CORE
13.1   6.9   )-----------( 234      ( 2018 13:56 )             38.4         135  |  95<L>  |  24<H>  ----------------------------<  264<H>  4.3   |  25  |  0.98    Ca    9.3      2018 12:06    TPro  7.6  /  Alb  3.3  /  TBili  0.4  /  DBili  x   /  AST  16  /  ALT  13  /  AlkPhos  95      CAPILLARY BLOOD GLUCOSE    Urinalysis Basic - ( 2018 12:52 )    Color: Yellow / Appearance: SL Turbid / S.025 / pH: x  Gluc: x / Ketone: Negative  / Bili: Negative / Urobili: Negative   Blood: x / Protein: 150 mg/dL / Nitrite: Negative   Leuk Esterase: Negative / RBC: 3-5 /HPF / WBC x   Sq Epi: x / Non Sq Epi: OCC /HPF / Bacteria: x    Rapid Respiratory Viral Panel (18 @ 12:06)    Rapid RVP Result: Detected: The [employer] RVP Rapid uses polymerase chain reaction (PCR) and melt  curve analysis to screen for adenovirus; coronavirus HKU1, NL63, 229E,  OC43; human metapneumovirus (hMPV); human enterovirus/rhinovirus  (Entero/RV); influenza A; influenza A/H1;influenza A/H3; influenza  A/H1-2009; influenza B; parainfluenza viruses 1, 2, 3, 4; respiratory  syncytial virus; Bordetella pertussis; Mycoplasma pneumoniae; and  Chlamydophila pneumoniae.    Influenza AH3 (RapRVP): Detected    Blood Gas Venous - Lactate (18 @ 12:06)    Blood Gas Venous - Lactate: 2.2 mmoL/L    Radiology:  < from: Xray Chest 1 View- PORTABLE-Urgent (18 @ 17:12) >    INTERPRETATION:  Marked interval reduction in right sided pneumothorax   after chest tube placement. Hazy opacification of the RLL may be   reexpansion edema or long contusion.   ET tube tip above the ines.    < end of copied text >    < from: Xray Abdomen 1 View PORTABLE -Urgent (18 @ 16:37) >      ******PRELIMINARY REPORT******    ******PRELIMINARY REPORT******          EXAM:  XR ABDOMEN PORTABLE URGENT 1V                            PROCEDURE DATE:  2018      ******PRELIMINARY REPORT******    ******PRELIMINARY REPORT******              INTERPRETATION:  Status post bilateral chest tube placement with   subcutaneous positioning of the left-sided chest tube.    Persistent right pneumothorax.    Dr. Austin discussed these findings with Dr. Velasquez on 2018 4:54   PM, with read back.    < end of copied text >

## 2018-01-30 NOTE — ED ADULT NURSE REASSESSMENT NOTE - NS ED NURSE REASSESS COMMENT FT1
Pt bagged at 1411, MDs present, crash cart ready, respiratory called xray ordered.   1414:100% O2 saturation bagged.   1418:etomidate 20mg given   1419: Succs give, 100mg  1421 Pt intubated, 22 lip line, Pt HR 99, 32 respirations, Pt desaturation to 44% back to 94%  1426 bilateral breath sounds positive color change, 23 at lip line Pt bagged at 1411, MDs present, crash cart ready, respiratory called xray ordered.   1414:100% O2 saturation bagged.   1418:etomidate 20mg given   1419: Succs give, 100mg  1421 Pt intubated, 22 lip line, Pt HR 99, 32 respirations, Pt desaturation to 44% back to 94% Intubation attempt unsuccessful, ambu bag used ET tube removed.   1425 second attempt for ET placement 7.5  1426 bilateral breath sounds positive color change, 23 at lip line, x ray confirmed.

## 2018-01-30 NOTE — ED ADULT NURSE REASSESSMENT NOTE - TEMPLATE LIST FOR HEAD TO TOE ASSESSMENT
Communicable/Infectious

## 2018-01-30 NOTE — H&P ADULT - ASSESSMENT
66y/o F hx Breast Cancer (diagnosed in Feb 2017) currently on Herceptin (last dose Jan 20th), HTN, Diabetes on 70/30, presenting with Fever at home, tmax of 102 yesterday, with whole body weakness found to be RVP positive for flu, subsequently went into PEA arrest x 2 with ROSC x 2 and Epi  x 7 doses, complicated by bilateral pneumothoraces s/p 3 chest tubes and paracentesis decompression of the abdomen now intubated in the MICU on levophed and fentanyl infusions.      Neuro:    Cardiovascular:    Respiratory:    GI:    Renal:     Heme:    Prophylaxis 68y/o F hx Breast Cancer (diagnosed in Feb 2017) currently on Herceptin (last dose Jan 20th), HTN, Diabetes on 70/30, presenting with Fever at home, tmax of 102 yesterday, with whole body weakness found to be RVP positive for flu, subsequently went into PEA arrest x 2 with ROSC x 2 and Epi  x 7 doses, complicated by bilateral pneumothoraces s/p 3 chest tubes and paracentesis decompression of the abdomen now intubated in the MICU on levophed and fentanyl infusions.      Neuro:  Pt sedated on fentanyl, q 4 neuro checks, daily sedation holiday      Cardiovascular:  - S/P PEA x 2 (10 min epi x 2, 30 min epi x 5)  - On levophed, titrate to MAP >65    Respiratory:  - Hypoxic respiratory failure, intubated, maintain SpO2 >92  - Initial concern in ED for PE s/p TPA 50 mg x2  - b/l pneumothoraces with 2 chest tubes on R and 1 chest tube on L    GI:  - GI prophylaxis Protonix 40 mg daily  - s/p paracentesis for abdominal decompression    Renal:   - Cr 0.98 (0.62 03/2017)    Heme:  - H/H stable,    ID:  - f/u blood cx, cover for pna with zosyn and azithromycin  - no leukocytosis, febrile Tmax 100.6    Prophylaxis   - SCD's 66y/o F hx Breast Cancer (diagnosed in Feb 2017) currently on Herceptin (last dose Jan 20th), HTN, Diabetes on 70/30, presenting with Fever at home, tmax of 102 yesterday, with whole body weakness found to be RVP positive for flu, subsequently went into PEA arrest x 2 with ROSC x 2 and Epi  x 7 doses, complicated by bilateral pneumothoraces s/p 3 chest tubes and paracentesis decompression of the abdomen now intubated in the MICU on levophed and fentanyl infusions.      Neuro:  Pt sedated on fentanyl, q 4 neuro checks, daily sedation holiday      Cardiovascular:  - S/P PEA x 2 (10 min epi x 2, 30 min epi x 5)  - On levophed, titrate to MAP >65    Respiratory:  - Hypoxic respiratory failure, intubated, maintain SpO2 >92  - Initial concern in ED for PE s/p TPA 50 mg x2  - b/l pneumothoraces with 2 chest tubes on R and 1 chest tube on L    GI:  - GI prophylaxis Protonix 40 mg daily  - s/p paracentesis for abdominal decompression    Renal:   - Cr 0.98 (0.62 03/2017)    Heme:  - H/H stable,    ID:  - f/u blood cx, cover for pna with zosyn and azithromycin  - no leukocytosis, febrile Tmax 100.6  - hold on tamiflu until f/u abd xray  Prophylaxis   - SCD's

## 2018-01-30 NOTE — ED PROCEDURE NOTE - ATTENDING CONTRIBUTION TO CARE
ATTENDING MD:  I, Andrew Velasquez, was available in the Emergency Department throughout the entire procedure and I was present during the key portions. I agree with the procedure as documented.
ATTENDING MD:  I, Andrew Velasquez, was available in the Emergency Department throughout the entire procedure and I was present during the key portions. I agree with the procedure as documented.

## 2018-01-30 NOTE — ED PROCEDURE NOTE - NS ED PROC PERFORMED BY1 FT
HECTOR Velasquez
SHAKA Lamas
HECTOR Velasquez
SHAKA Holguin
HECTOR Velasquez
HECTOR Velasquez
Dr. Leiva

## 2018-01-30 NOTE — CONSULT NOTE ADULT - ASSESSMENT
67F with pneumoperitoneum possibly related to CPR/chest compressions causing bilateral pneumothoraces, and positive pressure ventilation  -CT chest/abdomen/pelvis with PO contrast when stable enough per MICU to evaluate esophagus/stomach/intraabdominal contents  -Trend lactate, labs  -monitor UOP - pt currently oliguric, bladder pressure 16  -serial abdominal exams  -will follow

## 2018-01-30 NOTE — ED ADULT NURSE REASSESSMENT NOTE - NS ED NURSE REASSESS COMMENT FT1
Zithromax not given because of fentanyl and propofol drip, and ceftriaxone all occupying available IV access.

## 2018-01-30 NOTE — ED PROVIDER NOTE - NS ED ROS FT
ROS: denies HA, dizziness, chest pain, diaphoresis, abdominal pain, joint pain, neuro deficits, dysuria/hematuria, rash    +weakness, f/c, vomiting

## 2018-01-30 NOTE — ED PROVIDER NOTE - OBJECTIVE STATEMENT
66yo F with hx of breast ca s/p mastectomy on herceptin presents today with fever/weakness/cough. 2 days ago, started having dry cough that required robitussin. decreased appetite since yesterday, not eating as much. yesterday glucose of 350. temp yesterday highest 102, pt was given tylenol/nyquil. pt continued to have whole body weakness and fevers and brought to ER by family. Daughter takes care of the pt, also has similar sxs of dry cough and vomiting today. Pt is independent, lives with  at home; AOx4 at baseline

## 2018-01-30 NOTE — ED ADULT NURSE REASSESSMENT NOTE - NS ED NURSE REASSESS COMMENT FT1
Pt straight catheterized as ordered by MD with 2 RN's present, sterile technique used throughout, Pt tolerated well. 200 CC cloudy yellow urine drained.

## 2018-01-31 ENCOUNTER — RESULT REVIEW (OUTPATIENT)
Age: 68
End: 2018-01-31

## 2018-01-31 LAB
ALBUMIN SERPL ELPH-MCNC: 2.2 G/DL — LOW (ref 3.3–5)
ALBUMIN SERPL ELPH-MCNC: 2.4 G/DL — LOW (ref 3.3–5)
ALBUMIN SERPL ELPH-MCNC: 2.5 G/DL — LOW (ref 3.3–5)
ALP SERPL-CCNC: 63 U/L — SIGNIFICANT CHANGE UP (ref 40–120)
ALP SERPL-CCNC: 65 U/L — SIGNIFICANT CHANGE UP (ref 40–120)
ALP SERPL-CCNC: 92 U/L — SIGNIFICANT CHANGE UP (ref 40–120)
ALT FLD-CCNC: 1063 U/L RC — HIGH (ref 10–45)
ALT FLD-CCNC: 133 U/L RC — HIGH (ref 10–45)
ALT FLD-CCNC: 442 U/L RC — HIGH (ref 10–45)
ANION GAP SERPL CALC-SCNC: 14 MMOL/L — SIGNIFICANT CHANGE UP (ref 5–17)
ANION GAP SERPL CALC-SCNC: 16 MMOL/L — SIGNIFICANT CHANGE UP (ref 5–17)
ANION GAP SERPL CALC-SCNC: 17 MMOL/L — SIGNIFICANT CHANGE UP (ref 5–17)
APTT BLD: 33.3 SEC — SIGNIFICANT CHANGE UP (ref 27.5–37.4)
APTT BLD: 34.2 SEC — SIGNIFICANT CHANGE UP (ref 27.5–37.4)
APTT BLD: 35.5 SEC — SIGNIFICANT CHANGE UP (ref 27.5–37.4)
APTT BLD: 42.4 SEC — HIGH (ref 27.5–37.4)
AST SERPL-CCNC: 2376 U/L — HIGH (ref 10–40)
AST SERPL-CCNC: 352 U/L — HIGH (ref 10–40)
AST SERPL-CCNC: 946 U/L — HIGH (ref 10–40)
BASE EXCESS BLDV CALC-SCNC: -4.8 MMOL/L — LOW (ref -2–2)
BASE EXCESS BLDV CALC-SCNC: -6.3 MMOL/L — LOW (ref -2–2)
BILIRUB SERPL-MCNC: 0.5 MG/DL — SIGNIFICANT CHANGE UP (ref 0.2–1.2)
BILIRUB SERPL-MCNC: 0.6 MG/DL — SIGNIFICANT CHANGE UP (ref 0.2–1.2)
BILIRUB SERPL-MCNC: 0.6 MG/DL — SIGNIFICANT CHANGE UP (ref 0.2–1.2)
BLD GP AB SCN SERPL QL: NEGATIVE — SIGNIFICANT CHANGE UP
BUN SERPL-MCNC: 35 MG/DL — HIGH (ref 7–23)
BUN SERPL-MCNC: 38 MG/DL — HIGH (ref 7–23)
BUN SERPL-MCNC: 41 MG/DL — HIGH (ref 7–23)
BUN SERPL-MCNC: 46 MG/DL — HIGH (ref 7–23)
CA-I SERPL-SCNC: 1.01 MMOL/L — LOW (ref 1.12–1.3)
CA-I SERPL-SCNC: 1.09 MMOL/L — LOW (ref 1.12–1.3)
CALCIUM SERPL-MCNC: 7 MG/DL — LOW (ref 8.4–10.5)
CALCIUM SERPL-MCNC: 7.2 MG/DL — LOW (ref 8.4–10.5)
CALCIUM SERPL-MCNC: 7.2 MG/DL — LOW (ref 8.4–10.5)
CALCIUM SERPL-MCNC: 7.5 MG/DL — LOW (ref 8.4–10.5)
CHLORIDE BLDV-SCNC: 101 MMOL/L — SIGNIFICANT CHANGE UP (ref 96–108)
CHLORIDE BLDV-SCNC: 103 MMOL/L — SIGNIFICANT CHANGE UP (ref 96–108)
CHLORIDE SERPL-SCNC: 100 MMOL/L — SIGNIFICANT CHANGE UP (ref 96–108)
CHLORIDE SERPL-SCNC: 97 MMOL/L — SIGNIFICANT CHANGE UP (ref 96–108)
CK MB BLD-MCNC: 0.6 % — SIGNIFICANT CHANGE UP (ref 0–3.5)
CK MB BLD-MCNC: 1 % — SIGNIFICANT CHANGE UP (ref 0–3.5)
CK MB BLD-MCNC: 1.4 % — SIGNIFICANT CHANGE UP (ref 0–3.5)
CK MB BLD-MCNC: 2.1 % — SIGNIFICANT CHANGE UP (ref 0–3.5)
CK MB CFR SERPL CALC: 5 NG/ML — HIGH (ref 0–3.8)
CK MB CFR SERPL CALC: 5.3 NG/ML — HIGH (ref 0–3.8)
CK MB CFR SERPL CALC: 5.7 NG/ML — HIGH (ref 0–3.8)
CK MB CFR SERPL CALC: 6.4 NG/ML — HIGH (ref 0–3.8)
CK SERPL-CCNC: 234 U/L — HIGH (ref 25–170)
CK SERPL-CCNC: 395 U/L — HIGH (ref 25–170)
CK SERPL-CCNC: 672 U/L — HIGH (ref 25–170)
CK SERPL-CCNC: 821 U/L — HIGH (ref 25–170)
CO2 BLDV-SCNC: 23 MMOL/L — SIGNIFICANT CHANGE UP (ref 22–30)
CO2 BLDV-SCNC: 24 MMOL/L — SIGNIFICANT CHANGE UP (ref 22–30)
CO2 SERPL-SCNC: 18 MMOL/L — LOW (ref 22–31)
CO2 SERPL-SCNC: 19 MMOL/L — LOW (ref 22–31)
CO2 SERPL-SCNC: 20 MMOL/L — LOW (ref 22–31)
CO2 SERPL-SCNC: 20 MMOL/L — LOW (ref 22–31)
CREAT SERPL-MCNC: 1.67 MG/DL — HIGH (ref 0.5–1.3)
CREAT SERPL-MCNC: 2.09 MG/DL — HIGH (ref 0.5–1.3)
CREAT SERPL-MCNC: 2.71 MG/DL — HIGH (ref 0.5–1.3)
CREAT SERPL-MCNC: 3.15 MG/DL — HIGH (ref 0.5–1.3)
CULTURE RESULTS: SIGNIFICANT CHANGE UP
D DIMER BLD IA.RAPID-MCNC: HIGH NG/ML DDU
FIBRINOGEN PPP-MCNC: 320 MG/DL — SIGNIFICANT CHANGE UP (ref 310–510)
GAS PNL BLDV: 129 MMOL/L — LOW (ref 136–145)
GAS PNL BLDV: 130 MMOL/L — LOW (ref 136–145)
GAS PNL BLDV: SIGNIFICANT CHANGE UP
GLUCOSE BLDC GLUCOMTR-MCNC: 211 MG/DL — HIGH (ref 70–99)
GLUCOSE BLDC GLUCOMTR-MCNC: 212 MG/DL — HIGH (ref 70–99)
GLUCOSE BLDC GLUCOMTR-MCNC: 231 MG/DL — HIGH (ref 70–99)
GLUCOSE BLDC GLUCOMTR-MCNC: 238 MG/DL — HIGH (ref 70–99)
GLUCOSE BLDC GLUCOMTR-MCNC: 254 MG/DL — HIGH (ref 70–99)
GLUCOSE BLDC GLUCOMTR-MCNC: 256 MG/DL — HIGH (ref 70–99)
GLUCOSE BLDC GLUCOMTR-MCNC: 258 MG/DL — HIGH (ref 70–99)
GLUCOSE BLDC GLUCOMTR-MCNC: 313 MG/DL — HIGH (ref 70–99)
GLUCOSE BLDC GLUCOMTR-MCNC: 318 MG/DL — HIGH (ref 70–99)
GLUCOSE BLDV-MCNC: 221 MG/DL — HIGH (ref 70–99)
GLUCOSE BLDV-MCNC: 317 MG/DL — HIGH (ref 70–99)
GLUCOSE SERPL-MCNC: 233 MG/DL — HIGH (ref 70–99)
GLUCOSE SERPL-MCNC: 243 MG/DL — HIGH (ref 70–99)
GLUCOSE SERPL-MCNC: 339 MG/DL — HIGH (ref 70–99)
GLUCOSE SERPL-MCNC: 429 MG/DL — HIGH (ref 70–99)
HBA1C BLD-MCNC: 8.6 % — HIGH (ref 4–5.6)
HBA1C BLD-MCNC: 8.7 % — HIGH (ref 4–5.6)
HCO3 BLDV-SCNC: 21 MMOL/L — SIGNIFICANT CHANGE UP (ref 21–29)
HCO3 BLDV-SCNC: 22 MMOL/L — SIGNIFICANT CHANGE UP (ref 21–29)
HCT VFR BLD CALC: 25 % — LOW (ref 34.5–45)
HCT VFR BLD CALC: 25.2 % — LOW (ref 34.5–45)
HCT VFR BLD CALC: 26.9 % — LOW (ref 34.5–45)
HCT VFR BLD CALC: 31.2 % — LOW (ref 34.5–45)
HCT VFR BLDA CALC: 26 % — LOW (ref 39–50)
HCT VFR BLDA CALC: 28 % — LOW (ref 39–50)
HGB BLD CALC-MCNC: 8.4 G/DL — LOW (ref 11.5–15.5)
HGB BLD CALC-MCNC: 9 G/DL — LOW (ref 11.5–15.5)
HGB BLD-MCNC: 10.6 G/DL — LOW (ref 11.5–15.5)
HGB BLD-MCNC: 8.3 G/DL — LOW (ref 11.5–15.5)
HGB BLD-MCNC: 8.6 G/DL — LOW (ref 11.5–15.5)
HGB BLD-MCNC: 9.2 G/DL — LOW (ref 11.5–15.5)
HOROWITZ INDEX BLDV+IHG-RTO: 40 — SIGNIFICANT CHANGE UP
INR BLD: 1.6 RATIO — HIGH (ref 0.88–1.16)
INR BLD: 1.7 RATIO — HIGH (ref 0.88–1.16)
INR BLD: 1.8 RATIO — HIGH (ref 0.88–1.16)
INR BLD: 2.13 RATIO — HIGH (ref 0.88–1.16)
LACTATE BLDV-MCNC: 1.3 MMOL/L — SIGNIFICANT CHANGE UP (ref 0.7–2)
LACTATE BLDV-MCNC: 2.3 MMOL/L — HIGH (ref 0.7–2)
LDH SERPL L TO P-CCNC: 948 U/L — HIGH (ref 50–242)
LEGIONELLA AG UR QL: NEGATIVE — SIGNIFICANT CHANGE UP
MAGNESIUM SERPL-MCNC: 1.6 MG/DL — SIGNIFICANT CHANGE UP (ref 1.6–2.6)
MAGNESIUM SERPL-MCNC: 2 MG/DL — SIGNIFICANT CHANGE UP (ref 1.6–2.6)
MAGNESIUM SERPL-MCNC: 2.1 MG/DL — SIGNIFICANT CHANGE UP (ref 1.6–2.6)
MCHC RBC-ENTMCNC: 30.6 PG — SIGNIFICANT CHANGE UP (ref 27–34)
MCHC RBC-ENTMCNC: 31 PG — SIGNIFICANT CHANGE UP (ref 27–34)
MCHC RBC-ENTMCNC: 31.2 PG — SIGNIFICANT CHANGE UP (ref 27–34)
MCHC RBC-ENTMCNC: 31.4 PG — SIGNIFICANT CHANGE UP (ref 27–34)
MCHC RBC-ENTMCNC: 33.3 GM/DL — SIGNIFICANT CHANGE UP (ref 32–36)
MCHC RBC-ENTMCNC: 33.9 GM/DL — SIGNIFICANT CHANGE UP (ref 32–36)
MCHC RBC-ENTMCNC: 34.2 GM/DL — SIGNIFICANT CHANGE UP (ref 32–36)
MCHC RBC-ENTMCNC: 34.3 GM/DL — SIGNIFICANT CHANGE UP (ref 32–36)
MCV RBC AUTO: 90.4 FL — SIGNIFICANT CHANGE UP (ref 80–100)
MCV RBC AUTO: 91.6 FL — SIGNIFICANT CHANGE UP (ref 80–100)
MCV RBC AUTO: 91.7 FL — SIGNIFICANT CHANGE UP (ref 80–100)
MCV RBC AUTO: 92.1 FL — SIGNIFICANT CHANGE UP (ref 80–100)
OTHER CELLS CSF MANUAL: 8 ML/DL — LOW (ref 18–22)
OTHER CELLS CSF MANUAL: 9 ML/DL — LOW (ref 18–22)
PCO2 BLDV: 47 MMHG — SIGNIFICANT CHANGE UP (ref 35–50)
PCO2 BLDV: 63 MMHG — HIGH (ref 35–50)
PH BLDV: 7.17 — CRITICAL LOW (ref 7.35–7.45)
PH BLDV: 7.28 — LOW (ref 7.35–7.45)
PHOSPHATE SERPL-MCNC: 7.1 MG/DL — HIGH (ref 2.5–4.5)
PHOSPHATE SERPL-MCNC: 7.6 MG/DL — HIGH (ref 2.5–4.5)
PHOSPHATE SERPL-MCNC: 8.5 MG/DL — HIGH (ref 2.5–4.5)
PLATELET # BLD AUTO: 133 K/UL — LOW (ref 150–400)
PLATELET # BLD AUTO: 177 K/UL — SIGNIFICANT CHANGE UP (ref 150–400)
PLATELET # BLD AUTO: 245 K/UL — SIGNIFICANT CHANGE UP (ref 150–400)
PLATELET # BLD AUTO: 258 K/UL — SIGNIFICANT CHANGE UP (ref 150–400)
PO2 BLDV: 36 MMHG — SIGNIFICANT CHANGE UP (ref 25–45)
PO2 BLDV: 43 MMHG — SIGNIFICANT CHANGE UP (ref 25–45)
POTASSIUM BLDV-SCNC: 4.1 MMOL/L — SIGNIFICANT CHANGE UP (ref 3.5–5)
POTASSIUM BLDV-SCNC: 4.2 MMOL/L — SIGNIFICANT CHANGE UP (ref 3.5–5)
POTASSIUM SERPL-MCNC: 4.2 MMOL/L — SIGNIFICANT CHANGE UP (ref 3.5–5.3)
POTASSIUM SERPL-MCNC: 4.3 MMOL/L — SIGNIFICANT CHANGE UP (ref 3.5–5.3)
POTASSIUM SERPL-MCNC: 4.4 MMOL/L — SIGNIFICANT CHANGE UP (ref 3.5–5.3)
POTASSIUM SERPL-MCNC: 4.4 MMOL/L — SIGNIFICANT CHANGE UP (ref 3.5–5.3)
POTASSIUM SERPL-SCNC: 4.2 MMOL/L — SIGNIFICANT CHANGE UP (ref 3.5–5.3)
POTASSIUM SERPL-SCNC: 4.3 MMOL/L — SIGNIFICANT CHANGE UP (ref 3.5–5.3)
POTASSIUM SERPL-SCNC: 4.4 MMOL/L — SIGNIFICANT CHANGE UP (ref 3.5–5.3)
POTASSIUM SERPL-SCNC: 4.4 MMOL/L — SIGNIFICANT CHANGE UP (ref 3.5–5.3)
PROT SERPL-MCNC: 5.4 G/DL — LOW (ref 6–8.3)
PROT SERPL-MCNC: 5.4 G/DL — LOW (ref 6–8.3)
PROT SERPL-MCNC: 5.6 G/DL — LOW (ref 6–8.3)
PROTHROM AB SERPL-ACNC: 17.4 SEC — HIGH (ref 9.8–12.7)
PROTHROM AB SERPL-ACNC: 18.6 SEC — HIGH (ref 9.8–12.7)
PROTHROM AB SERPL-ACNC: 19.9 SEC — HIGH (ref 9.8–12.7)
PROTHROM AB SERPL-ACNC: 23.6 SEC — HIGH (ref 9.8–12.7)
RBC # BLD: 2.73 M/UL — LOW (ref 3.8–5.2)
RBC # BLD: 2.79 M/UL — LOW (ref 3.8–5.2)
RBC # BLD: 2.94 M/UL — LOW (ref 3.8–5.2)
RBC # BLD: 3.39 M/UL — LOW (ref 3.8–5.2)
RBC # FLD: 12 % — SIGNIFICANT CHANGE UP (ref 10.3–14.5)
RH IG SCN BLD-IMP: POSITIVE — SIGNIFICANT CHANGE UP
RH IG SCN BLD-IMP: POSITIVE — SIGNIFICANT CHANGE UP
SAO2 % BLDV: 62 % — LOW (ref 67–88)
SAO2 % BLDV: 74 % — SIGNIFICANT CHANGE UP (ref 67–88)
SODIUM SERPL-SCNC: 131 MMOL/L — LOW (ref 135–145)
SODIUM SERPL-SCNC: 132 MMOL/L — LOW (ref 135–145)
SODIUM SERPL-SCNC: 133 MMOL/L — LOW (ref 135–145)
SODIUM SERPL-SCNC: 135 MMOL/L — SIGNIFICANT CHANGE UP (ref 135–145)
SPECIMEN SOURCE: SIGNIFICANT CHANGE UP
TROPONIN T SERPL-MCNC: 0.08 NG/ML — HIGH (ref 0–0.06)
TROPONIN T SERPL-MCNC: 0.08 NG/ML — HIGH (ref 0–0.06)
TROPONIN T SERPL-MCNC: 0.12 NG/ML — HIGH (ref 0–0.06)
TROPONIN T SERPL-MCNC: 0.16 NG/ML — HIGH (ref 0–0.06)
WBC # BLD: 10.2 K/UL — SIGNIFICANT CHANGE UP (ref 3.8–10.5)
WBC # BLD: 14.2 K/UL — HIGH (ref 3.8–10.5)
WBC # BLD: 17.2 K/UL — HIGH (ref 3.8–10.5)
WBC # BLD: 23.5 K/UL — HIGH (ref 3.8–10.5)
WBC # FLD AUTO: 10.2 K/UL — SIGNIFICANT CHANGE UP (ref 3.8–10.5)
WBC # FLD AUTO: 14.2 K/UL — HIGH (ref 3.8–10.5)
WBC # FLD AUTO: 17.2 K/UL — HIGH (ref 3.8–10.5)
WBC # FLD AUTO: 23.5 K/UL — HIGH (ref 3.8–10.5)

## 2018-01-31 PROCEDURE — 99291 CRITICAL CARE FIRST HOUR: CPT

## 2018-01-31 PROCEDURE — 71045 X-RAY EXAM CHEST 1 VIEW: CPT | Mod: 26,77

## 2018-01-31 PROCEDURE — 95819 EEG AWAKE AND ASLEEP: CPT | Mod: 26

## 2018-01-31 PROCEDURE — 88112 CYTOPATH CELL ENHANCE TECH: CPT | Mod: 26

## 2018-01-31 PROCEDURE — 70450 CT HEAD/BRAIN W/O DYE: CPT | Mod: 26,77

## 2018-01-31 PROCEDURE — 70490 CT SOFT TISSUE NECK W/O DYE: CPT | Mod: 26

## 2018-01-31 PROCEDURE — 74176 CT ABD & PELVIS W/O CONTRAST: CPT | Mod: 26

## 2018-01-31 PROCEDURE — 71045 X-RAY EXAM CHEST 1 VIEW: CPT | Mod: 26

## 2018-01-31 PROCEDURE — 99292 CRITICAL CARE ADDL 30 MIN: CPT

## 2018-01-31 PROCEDURE — 71250 CT THORAX DX C-: CPT | Mod: 26

## 2018-01-31 PROCEDURE — 70450 CT HEAD/BRAIN W/O DYE: CPT | Mod: 26

## 2018-01-31 PROCEDURE — 74018 RADEX ABDOMEN 1 VIEW: CPT | Mod: 26,59

## 2018-01-31 PROCEDURE — 99223 1ST HOSP IP/OBS HIGH 75: CPT | Mod: 57

## 2018-01-31 RX ORDER — PHYTONADIONE (VIT K1) 5 MG
10 TABLET ORAL ONCE
Qty: 0 | Refills: 0 | Status: COMPLETED | OUTPATIENT
Start: 2018-01-31 | End: 2018-01-31

## 2018-01-31 RX ORDER — VANCOMYCIN HCL 1 G
1000 VIAL (EA) INTRAVENOUS EVERY 24 HOURS
Qty: 0 | Refills: 0 | Status: DISCONTINUED | OUTPATIENT
Start: 2018-01-31 | End: 2018-01-31

## 2018-01-31 RX ORDER — INSULIN HUMAN 100 [IU]/ML
5 INJECTION, SOLUTION SUBCUTANEOUS
Qty: 100 | Refills: 0 | Status: DISCONTINUED | OUTPATIENT
Start: 2018-01-31 | End: 2018-01-31

## 2018-01-31 RX ORDER — SODIUM CHLORIDE 9 MG/ML
1000 INJECTION INTRAMUSCULAR; INTRAVENOUS; SUBCUTANEOUS
Qty: 0 | Refills: 0 | Status: DISCONTINUED | OUTPATIENT
Start: 2018-01-31 | End: 2018-01-31

## 2018-01-31 RX ORDER — INSULIN LISPRO 100/ML
VIAL (ML) SUBCUTANEOUS EVERY 4 HOURS
Qty: 0 | Refills: 0 | Status: DISCONTINUED | OUTPATIENT
Start: 2018-01-31 | End: 2018-02-01

## 2018-01-31 RX ORDER — PIPERACILLIN AND TAZOBACTAM 4; .5 G/20ML; G/20ML
3.38 INJECTION, POWDER, LYOPHILIZED, FOR SOLUTION INTRAVENOUS EVERY 12 HOURS
Qty: 0 | Refills: 0 | Status: DISCONTINUED | OUTPATIENT
Start: 2018-01-31 | End: 2018-01-31

## 2018-01-31 RX ORDER — PANTOPRAZOLE SODIUM 20 MG/1
40 TABLET, DELAYED RELEASE ORAL ONCE
Qty: 0 | Refills: 0 | Status: COMPLETED | OUTPATIENT
Start: 2018-01-31 | End: 2018-01-31

## 2018-01-31 RX ORDER — PIPERACILLIN AND TAZOBACTAM 4; .5 G/20ML; G/20ML
3.38 INJECTION, POWDER, LYOPHILIZED, FOR SOLUTION INTRAVENOUS EVERY 12 HOURS
Qty: 0 | Refills: 0 | Status: DISCONTINUED | OUTPATIENT
Start: 2018-01-31 | End: 2018-02-01

## 2018-01-31 RX ORDER — DEXMEDETOMIDINE HYDROCHLORIDE IN 0.9% SODIUM CHLORIDE 4 UG/ML
0.11 INJECTION INTRAVENOUS
Qty: 200 | Refills: 0 | Status: DISCONTINUED | OUTPATIENT
Start: 2018-01-31 | End: 2018-02-03

## 2018-01-31 RX ORDER — FENTANYL CITRATE 50 UG/ML
25 INJECTION INTRAVENOUS ONCE
Qty: 0 | Refills: 0 | Status: DISCONTINUED | OUTPATIENT
Start: 2018-01-31 | End: 2018-01-31

## 2018-01-31 RX ORDER — DESMOPRESSIN ACETATE 0.1 MG/1
20 TABLET ORAL ONCE
Qty: 0 | Refills: 0 | Status: COMPLETED | OUTPATIENT
Start: 2018-01-31 | End: 2018-01-31

## 2018-01-31 RX ORDER — HEPARIN SODIUM 5000 [USP'U]/ML
5000 INJECTION INTRAVENOUS; SUBCUTANEOUS EVERY 8 HOURS
Qty: 0 | Refills: 0 | Status: DISCONTINUED | OUTPATIENT
Start: 2018-01-31 | End: 2018-02-21

## 2018-01-31 RX ORDER — MAGNESIUM SULFATE 500 MG/ML
2 VIAL (ML) INJECTION ONCE
Qty: 0 | Refills: 0 | Status: COMPLETED | OUTPATIENT
Start: 2018-01-31 | End: 2018-01-31

## 2018-01-31 RX ORDER — PANTOPRAZOLE SODIUM 20 MG/1
40 TABLET, DELAYED RELEASE ORAL EVERY 12 HOURS
Qty: 0 | Refills: 0 | Status: DISCONTINUED | OUTPATIENT
Start: 2018-01-31 | End: 2018-02-09

## 2018-01-31 RX ADMIN — Medication 6: at 21:15

## 2018-01-31 RX ADMIN — Medication 4: at 10:37

## 2018-01-31 RX ADMIN — DEXMEDETOMIDINE HYDROCHLORIDE IN 0.9% SODIUM CHLORIDE 2 MICROGRAM(S)/KG/HR: 4 INJECTION INTRAVENOUS at 13:30

## 2018-01-31 RX ADMIN — FENTANYL CITRATE 25 MICROGRAM(S): 50 INJECTION INTRAVENOUS at 22:50

## 2018-01-31 RX ADMIN — HEPARIN SODIUM 5000 UNIT(S): 5000 INJECTION INTRAVENOUS; SUBCUTANEOUS at 16:54

## 2018-01-31 RX ADMIN — Medication 3 MILLILITER(S): at 23:17

## 2018-01-31 RX ADMIN — Medication 3 MILLILITER(S): at 00:28

## 2018-01-31 RX ADMIN — Medication 50 GRAM(S): at 02:16

## 2018-01-31 RX ADMIN — AZITHROMYCIN 250 MILLIGRAM(S): 500 TABLET, FILM COATED ORAL at 19:12

## 2018-01-31 RX ADMIN — INSULIN HUMAN 5 UNIT(S)/HR: 100 INJECTION, SOLUTION SUBCUTANEOUS at 05:09

## 2018-01-31 RX ADMIN — Medication 50 MILLIGRAM(S): at 05:08

## 2018-01-31 RX ADMIN — FENTANYL CITRATE 25 MICROGRAM(S): 50 INJECTION INTRAVENOUS at 22:35

## 2018-01-31 RX ADMIN — Medication 3 MILLILITER(S): at 05:50

## 2018-01-31 RX ADMIN — Medication 4: at 18:25

## 2018-01-31 RX ADMIN — PANTOPRAZOLE SODIUM 40 MILLIGRAM(S): 20 TABLET, DELAYED RELEASE ORAL at 02:16

## 2018-01-31 RX ADMIN — PIPERACILLIN AND TAZOBACTAM 25 GRAM(S): 4; .5 INJECTION, POWDER, LYOPHILIZED, FOR SOLUTION INTRAVENOUS at 17:25

## 2018-01-31 RX ADMIN — DESMOPRESSIN ACETATE 220 MICROGRAM(S): 0.1 TABLET ORAL at 02:38

## 2018-01-31 RX ADMIN — Medication 30 MILLIGRAM(S): at 05:08

## 2018-01-31 RX ADMIN — PIPERACILLIN AND TAZOBACTAM 25 GRAM(S): 4; .5 INJECTION, POWDER, LYOPHILIZED, FOR SOLUTION INTRAVENOUS at 05:08

## 2018-01-31 RX ADMIN — Medication 250 MILLIGRAM(S): at 02:26

## 2018-01-31 RX ADMIN — SODIUM CHLORIDE 100 MILLILITER(S): 9 INJECTION INTRAMUSCULAR; INTRAVENOUS; SUBCUTANEOUS at 03:03

## 2018-01-31 RX ADMIN — Medication 1.31 MICROGRAM(S)/KG/MIN: at 16:45

## 2018-01-31 RX ADMIN — Medication 4: at 13:55

## 2018-01-31 RX ADMIN — Medication 102 MILLIGRAM(S): at 02:38

## 2018-01-31 RX ADMIN — PANTOPRAZOLE SODIUM 40 MILLIGRAM(S): 20 TABLET, DELAYED RELEASE ORAL at 13:55

## 2018-01-31 RX ADMIN — Medication 3 MILLILITER(S): at 17:07

## 2018-01-31 RX ADMIN — Medication 3 MILLILITER(S): at 11:20

## 2018-01-31 RX ADMIN — Medication 30 MILLIGRAM(S): at 18:25

## 2018-01-31 NOTE — DIETITIAN INITIAL EVALUATION ADULT. - ENERGY NEEDS
Ht: 63 inches, Wt: 170 pounds, UBW: 159 pounds, IBW: 115 pounds (+/- 10%),  148% IBW, BMI: 30.1 kg/m^2; generalized +1 edema and no pressure injuries noted  67 y.o. female admitted for PEA arrest x2, found with bilateral pneumothoraces s/p 3 chest tubes, pneumoperitoneum s/p peritoneal drain, paracentesis, and influenza PNA, ARDS, shock. PMH: Breast Ca s/p bilateral masectomy and herceptin last dose 1/20/18, intubated and sedated. Ht: 63 inches, Wt: 170 pounds, UBW: 159 pounds, IBW: 115 pounds (+/- 10%),  148% IBW, BMI: 30.1 kg/m^2; generalized +1 edema and no pressure injuries noted  67 y.o. female admitted for PEA arrest x2 with ROSC x2, found with bilateral pneumothoraces s/p 3 chest tubes, pneumoperitoneum s/p peritoneal drain, paracentesis, and influenza PNA, ARDS, shock. PMH: Breast Ca s/p bilateral masectomy and herceptin last dose 1/20/18, intubated and sedated.

## 2018-01-31 NOTE — PROGRESS NOTE ADULT - ATTENDING COMMENTS
67F w PMH of bilateral Breast CA (on Trastuzumab, last dose Jan 20th) admitted with malaise flu A + and then in ER  had sudden onset dyspnea with tachycardia and hypoxemia and PEA arrest - received TPA for presumed PE - intubation initially esophageal and then with subsequent tracheal intubation with normal oxygenation x 10 minutes then with recurrent PEA with subcutaneous emphysema, empiric left pigtail cath placed and then with subsequent rgt pneumothorax and distended abdomen. ALso noted to have small L PCA nonhemorrhagic stroke and now with anuria and likely ATN. Currently with improved P:F ratio with low plateau pressures, responds to voice. Chest CT demonstrates pigtail catheters (2 on the rgt and 1 on the left) are intraparenchymal. Evidence of lung sliding on bedside US indicating no further ptx. Shock likely cardiogenic with poor views due to subcutaneous emphysema on norepi moderate dose    - > EEG 24 hrs  - > remove chest tubes with thoracic surgery c/s in case of complications   - > cont broad spectrum abx until cx finalized  - > neuro c/s re CVA maintain normotensive  - > Renal cs re need for CVVH 67F w PMH of bilateral Breast CA (on Trastuzumab, last dose Jan 20th) admitted with malaise flu A + and then in ER  had sudden onset dyspnea with tachycardia and hypoxemia and PEA arrest - received TPA for presumed PE - intubation initially esophageal and then with subsequent tracheal intubation with normal oxygenation x 10 minutes then with recurrent PEA with subcutaneous emphysema, empiric left pigtail cath placed and then with subsequent rgt pneumothorax and distended abdomen. Also noted to have small L PCA nonhemorrhagic stroke and now with anuria and likely ATN. Currently with improved P:F ratio with low plateau pressures, responds to voice. Chest CT demonstrates pigtail catheters (2 on the rgt and 1 on the left) are intraparenchymal. Evidence of lung sliding on bedside US indicating no further ptx. Shock likely cardiogenic with poor views due to subcutaneous emphysema on norepi moderate dose.     - > EEG 24 hrs  - > remove chest tubes with thoracic surgery c/s in case of complications - follow plateau pressures  - > chg to precidex  - > cont broad spectrum abx until cx finalized  - > neuro c/s re CVA maintain normotensive  - > Renal cs re need for CVVH  - > Hold off on heparin given intraparenchymal pigtail tubes which were just removed and await cardiac mri results - if  rgt heart strain would start heparin.  - > cardiac MRI     Family aware (daughter and sister as well as family friend who is an MD)    cc time: 90 minutes

## 2018-01-31 NOTE — CONSULT NOTE ADULT - SUBJECTIVE AND OBJECTIVE BOX
THORACIC SURGERY CONSULT NOTE    Patient is a 67y old  Female who presents with a chief complaint of Fever, total body weakness (2018 17:58)    HPI:  68y/o F hx Breast Cancer (diagnosed in 2017) currently on Herceptin (last dose ), HTN, Diabetes on , presenting with Fever at home, tmax of 102 yesterday, with whole body weakness . Pt was found to have + RVP. Pt went into PEA arrest in ER requiring chest compressions for approx 40 mins re-intubation for esophageal intubation. Pt was found to have b/l pneumothoraces s/p 3 pigtails (2 right, 1 left) and paracentesis drain for pneumoperitoneum. Today, CT chest showed       PAST MEDICAL & SURGICAL HISTORY:  Diabetes  Breast CA  H/O mastectomy, bilateral    [  ] No significant past history as reviewed with the patient and family    FAMILY HISTORY:  No pertinent family history in first degree relatives    [  ] Family history not pertinent as reviewed with the patient and family    SOCIAL HISTORY:    MEDICATIONS  (STANDING):  ALBUTerol/ipratropium for Nebulization 3 milliLiter(s) Nebulizer every 6 hours  azithromycin  IVPB 500 milliGRAM(s) IV Intermittent every 24 hours  azithromycin  IVPB      dexmedetomidine Infusion 0.107 MICROgram(s)/kG/Hr (2 mL/Hr) IV Continuous <Continuous>  fentaNYL   Infusion 0.936 MICROgram(s)/kG/Hr (3.5 mL/Hr) IV Continuous <Continuous>  heparin  Injectable 5000 Unit(s) SubCutaneous every 8 hours  insulin lispro (HumaLOG) corrective regimen sliding scale   SubCutaneous every 4 hours  norepinephrine Infusion 0.01 MICROgram(s)/kG/Min (1.313 mL/Hr) IV Continuous <Continuous>  oseltamivir Suspension 30 milliGRAM(s) Oral every 12 hours  pantoprazole  Injectable 40 milliGRAM(s) IV Push every 12 hours  piperacillin/tazobactam IVPB. 3.375 Gram(s) IV Intermittent every 12 hours    MEDICATIONS  (PRN):  LORazepam   Injectable 4 milliGRAM(s) IV Push once PRN Agitation  midazolam Injectable 2 milliGRAM(s) IV Push every 2 hours PRN Agitation    Allergies    NIFEdipine (Urticaria; Hives)  vitamin E (Short breath; Urticaria; Hives)    Intolerances        Vital Signs Last 24 Hrs  T(C): 37.9 (2018 12:00), Max: 37.9 (2018 12:00)  T(F): 100.3 (2018 12:00), Max: 100.3 (2018 12:00)  HR: 77 (2018 14:45) (77 - 175)  BP: 101/44 (2018 14:45) (48/16 - 200/59)  BP(mean): 64 (2018 14:45) (52 - 112)  RR: 22 (2018 14:45) (0 - 41)  SpO2: 99% (2018 14:45) (81% - 100%)  Daily Height in cm: 160.02 (2018 18:15)    Daily Weight in k.1 (2018 12:30)    Exam:  General: intubated, sedated  HEENT: NCAT  Resp: on vent  Chest: equal chest rise, 2 right, 1 left pigtail in place  Abd: soft, distended, subcutaneous emphysema  Ext: CENTENO  Neuro: intact                          8.3    14.2  )-----------( 177      ( 2018 14:35 )             25.0     01-31    132<L>  |  97  |  38<H>  ----------------------------<  339<H>  4.4   |  18<L>  |  2.09<H>    Ca    7.5<L>      2018 05:33  Phos  8.5     01-30  Mg     1.6     01-30    TPro  5.4<L>  /  Alb  2.2<L>  /  TBili  0.5  /  DBili  x   /  AST  352<H>  /  ALT  133<H>  /  AlkPhos  92  01-30    PT/INR - ( 2018 05:33 )   PT: 19.9 sec;   INR: 1.80 ratio         PTT - ( 2018 14:35 )  PTT:33.3 sec  Urinalysis Basic - ( 2018 12:52 )    Color: Yellow / Appearance: SL Turbid / S.025 / pH: x  Gluc: x / Ketone: Negative  / Bili: Negative / Urobili: Negative   Blood: x / Protein: 150 mg/dL / Nitrite: Negative   Leuk Esterase: Negative / RBC: 3-5 /HPF / WBC x   Sq Epi: x / Non Sq Epi: OCC /HPF / Bacteria: x        IMAGING STUDIES:  < from: CT Abdomen and Pelvis w/ Oral Cont (18 @ 03:22) >  IMPRESSION:   Malpositioned endotracheal tube with tip in the right mainstem bronchus.    Multifocal consolidation in both lungs, probably pneumonia. Area of   cavitation is present in the right upper lobe.    Focal dilated loops of small bowel in the lower abdomen, of uncertain   etiology.    Large volume pneumoperitoneum without extraluminal fluid or contrast,   possibly extension of pneumothorax/pneumomediastinum into the abdominal   cavity.    Extensive subcutaneous emphysema in the chest and abdominal wall.    Tips of the bilateral pigtail catheters may terminate in the lung   parenchyma.      < end of copied text >

## 2018-01-31 NOTE — CONSULT NOTE ADULT - SUBJECTIVE AND OBJECTIVE BOX
HPI:  66y/o F hx Breast Cancer (diagnosed in 2017) currently on Herceptin (last dose ), HTN, Diabetes on 70/30, presenting with Fever at home, tmax of 102 yesterday, with whole body weakness. Patient also had decreased appetite yesterday. Patient's daughter states that she herself felt unwell and was going to bring both herself and her mother to the doctor's office today. However, given pt had worsening weakness, daughter brought the patient to the ER. Patient is otherwise noted to be independent and lives at home with her . She is AOx4 at baseline.     In the ER, patient was found to have T max of 100.6, with initial vitals of 99.5, HR 82, /78, RR 18, satting 93% on room air. She was given ceftriaxone x1, and given 2L normal saline bolus with Duoneb x 3. Patient was also found to be RVP positive for flu. Patient then went into PEA arrest with chest compressions for 10min, received Rosc after Epi x 2, and was intubated. Patient required re-intubation (improperly intubated initially into the esophagus) after she PEA arrested again, with ROSC achieved after 30min with Epi x 5. Patient was also found to have bilateral pneumothoraces, s/p 3 chest tubes, 2 on the right side, and 1 on the left. Patient also has paracentesis drain for decompression of the abdomen.     Most recent vitals are BP 96/61, , RR 34, O2 saturation 95% on Fentanyl infusion, and levophed 0.05. (2018 17:58)    ABOVE REVIEWED  PATIENT SEEN AT BEDSIDE IN MICU  68 yo F with Breast cancer, diabetes, presented with fevers, weakness and decreased appetite.  Found to be flu positive. Went into hypercapnic hypoxic respiratory failure, and was intubated.  Initally was esophageal intubation and reintubated.  Thought to have pneumothorax and pigtail placed.  Thought to have PE, was give TPA.  Then had hemorrhagic stroke.    Pt now in MICU, ventilator support, occasionally opens her eyes. Family at bedside.   Risk benefits of dialysis explained.      Of note, patient completed treatment for TB in september.   is in [place], and on his way here.      PMH:   Diabetes  Breast CA      PSH:   H/O mastectomy, bilateral  No significant past surgical history      FAMILY HISTORY:  No pertinent family history in first degree relatives      Social History:  non-smoker/ non-alcoholic     Home Meds:  MEDICATIONS  (STANDING):  ALBUTerol/ipratropium for Nebulization 3 milliLiter(s) Nebulizer every 6 hours  azithromycin  IVPB 500 milliGRAM(s) IV Intermittent every 24 hours  azithromycin  IVPB      dexmedetomidine Infusion 0.107 MICROgram(s)/kG/Hr (2 mL/Hr) IV Continuous <Continuous>  fentaNYL   Infusion 0.936 MICROgram(s)/kG/Hr (3.5 mL/Hr) IV Continuous <Continuous>  heparin  Injectable 5000 Unit(s) SubCutaneous every 8 hours  insulin lispro (HumaLOG) corrective regimen sliding scale   SubCutaneous every 4 hours  norepinephrine Infusion 0.01 MICROgram(s)/kG/Min (1.313 mL/Hr) IV Continuous <Continuous>  oseltamivir Suspension 30 milliGRAM(s) Oral every 12 hours  pantoprazole  Injectable 40 milliGRAM(s) IV Push every 12 hours  piperacillin/tazobactam IVPB. 3.375 Gram(s) IV Intermittent every 12 hours    MEDICATIONS  (PRN):  LORazepam   Injectable 4 milliGRAM(s) IV Push once PRN Agitation  midazolam Injectable 2 milliGRAM(s) IV Push every 2 hours PRN Agitation      Allergies:  Allergies    NIFEdipine (Urticaria; Hives)  vitamin E (Short breath; Urticaria; Hives)    Intolerances        REVIEW OF SYSTEMS:  Unable to obtain because intubated and AMS    Vital Signs Last 24 Hrs  T(C): 37.9 (2018 12:00), Max: 37.9 (2018 12:00)  T(F): 100.3 (2018 12:00), Max: 100.3 (2018 12:00)  HR: 87 (2018 16:18) (77 - 117)  BP: 114/53 (2018 16:00) (76/46 - 178/141)  BP(mean): 76 (2018 16:00) (56 - 112)  RR: 22 (2018 16:00) (0 - 41)  SpO2: 100% (2018 16:18) (82% - 100%)     @ 07:01  -   @ 07:00  --------------------------------------------------------  IN: 4921.6 mL / OUT: 217 mL / NET: 4704.6 mL     @ 07:  -   @ 16:42  --------------------------------------------------------  IN: 519.1 mL / OUT: 27 mL / NET: 492.1 mL        PHYSICAL EXAM:  GENERAL: Intubated, comfortable  HEAD:  Atraumatic, Normocephalic  EYES: conjunctiva and sclera clear  ENMT: Moist mucous membranes,  NECK: Supple, No JVD  NERVOUS SYSTEM:  Opened eyes to noxious stimuli.   CHEST/LUNG: Clear to percussion bilaterally, mainly vent sounds  HEART: Regular rate and rhythm; No murmurs, rubs, or gallops  ABDOMEN: Soft, Non-tender, Nondistended; Bowel sounds present  EXTREMITIES:  2+ Peripheral Pulses, No cyanosis, or edema  SKIN: No rashes or lesions    LABS:                        8.3    14.2  )-----------( 177      ( 2018 14:35 )             25.0         133<L>  |  97  |  41<H>  ----------------------------<  233<H>  4.3   |  20<L>  |  2.71<H>    Ca    7.2<L>      2018 14:35  Phos  7.1       Mg     2.0         TPro  5.4<L>  /  Alb  2.5<L>  /  TBili  0.6  /  DBili  x   /  AST  946<H>  /  ALT  442<H>  /  AlkPhos  63      PT/INR - ( 2018 14:35 )   PT: 17.4 sec;   INR: 1.60 ratio         PTT - ( 2018 14:35 )  PTT:33.3 sec  Urinalysis Basic - ( 2018 12:52 )    Color: Yellow / Appearance: SL Turbid / S.025 / pH: x  Gluc: x / Ketone: Negative  / Bili: Negative / Urobili: Negative   Blood: x / Protein: 150 mg/dL / Nitrite: Negative   Leuk Esterase: Negative / RBC: 3-5 /HPF / WBC x   Sq Epi: x / Non Sq Epi: OCC /HPF / Bacteria: x      Magnesium, Serum: 2.0 mg/dL ( @ 14:35)  Phosphorus Level, Serum: 7.1 mg/dL <H> ( @ 14:35)  Magnesium, Serum: 1.6 mg/dL ( @ 23:33)  Phosphorus Level, Serum: 8.5 mg/dL <H> ( @ 23:33)  Magnesium, Serum: 1.8 mg/dL ( @ 19:25)  Phosphorus Level, Serum: 11.5 mg/dL <H> ( @ 19:25)    Urine studies      Medications:  ALBUTerol/ipratropium for Nebulization 3 milliLiter(s) Nebulizer every 6 hours  azithromycin  IVPB 500 milliGRAM(s) IV Intermittent every 24 hours  azithromycin  IVPB      dexmedetomidine Infusion 0.107 MICROgram(s)/kG/Hr IV Continuous <Continuous>  fentaNYL   Infusion 0.936 MICROgram(s)/kG/Hr IV Continuous <Continuous>  heparin  Injectable 5000 Unit(s) SubCutaneous every 8 hours  insulin lispro (HumaLOG) corrective regimen sliding scale   SubCutaneous every 4 hours  LORazepam   Injectable 4 milliGRAM(s) IV Push once PRN  midazolam Injectable 2 milliGRAM(s) IV Push every 2 hours PRN  norepinephrine Infusion 0.01 MICROgram(s)/kG/Min IV Continuous <Continuous>  oseltamivir Suspension 30 milliGRAM(s) Oral every 12 hours  pantoprazole  Injectable 40 milliGRAM(s) IV Push every 12 hours  piperacillin/tazobactam IVPB. 3.375 Gram(s) IV Intermittent every 12 hours      RADIOLOGY & ADDITIONAL TESTS:  < from: CT Neck Soft Tissue No Cont (18 @ 03:42) >  IMPRESSION:    CT BRAIN:     New small acute left PCA territory infarct without hemorrhagic   transformation.    Paranasal sinus opacification as described consistent with sinusitis.    Chronic left otomastoiditis with underlying cholesteatoma.    CT NECK:    Extensive subcutaneous emphysema in the neck as described. No gross   evidence for abscess or hematoma.    < end of copied text >    < from: CT Abdomen and Pelvis w/ Oral Cont (18 @ 03:22) >  IMPRESSION:   Malpositioned endotracheal tube with tip in the right mainstem bronchus.    Multifocal consolidation in both lungs, probably pneumonia. Area of   cavitation is present in the right upper lobe.    Focal dilated loops of small bowel in the lower abdomen, of uncertain   etiology.    Large volume pneumoperitoneum without extraluminal fluid or contrast,   possibly extension of pneumothorax/pneumomediastinum into the abdominal   cavity.    Extensive subcutaneous emphysema in the chest and abdominal wall.    Tips of the bilateral pigtail catheters may terminate in the lung   parenchyma.    < end of copied text >

## 2018-01-31 NOTE — DIETITIAN INITIAL EVALUATION ADULT. - PERTINENT LABORATORY DATA
01-31 Na 132 mmol/L<L> K+ 4.4 mmol/L Cr  2.09 mg/dL<H> BUN 38 mg/dL<H>  Hgb 9.2 g/dL<L> Hct 26.9 %<L> Glu 339 mg/dL<H>; fingerstick ranges (1/30 -1/31) 211-431 mg/dL, A1c 8.6% 1/31

## 2018-01-31 NOTE — CONSULT NOTE ADULT - ASSESSMENT
66yo F with breast CA on chemo admitted to MICU 1/30 after PEA arrest in ED with 3 pigtail catheters with concern for tip in lung parenchyma    - dc chest tubes one at a time  - evaluate for ptx, if ptx present may need chest tube placement  - please call thoracic surgery for change in patient status, questions or concerns    seen by Dr. Groves in MICU, discussed with MICU team  Thoracic surgery  [id number]

## 2018-01-31 NOTE — EEG REPORT - NS EEG TEXT BOX
[facility]   COMPREHENSIVE EPILEPSY CENTER   REPORT OF ROUTINE VIDEO EEG     [facility]: [address] [place] [address] [address], [place] [address], Ph#: [phone]  [facility]: [address], [place] [address], Ph#: [phone]  Office: [address] [facility], [facility] [address], [place] [address] Ph#: [phone]    Patient Name: MILANA BALL  Age and : 67y (46)  MRN #: [id number]  Location: [facility] 518 W1    Study Date: 18    _____________________________________________________________  TECHNICAL INFORMATION    EEG Placement and Labeling of Electrodes:  The EEG was performed utilizing 20 channels referential EEG connections (coronal over temporal over parasagittal montage) using all standard 10-20 electrode placements with EKG.  Recording was at a sampling rate of 256 samples per second per channel.  Time synchronized digital video recording was done simultaneously with EEG recording.  A low light infrared camera was used for low light recording.  Juan Jose and seizure detection algorithms were utilized.    _____________________________________________________________  HISTORY:  Patient is a 67y old  Female who presents with a chief complaint of Fever, total body weakness (2018 17:58)    PERTINENT MEDICATION:  None    _____________________________________________________________  STUDY INTERPRETATION    Background:  The background was continuous, spontaneously variable, reactive, and predominantly consisted of theta and polymorphic delta activities. No posteriorly dominant rhythm seen.      Sleep:  Stage II sleep transients were not recorded.    Non-epileptiform Paroxysmal Abnormalities:  None    Interictal Epileptiform Abnormalities:   None    Ictal Abnormalities:   No clinical events.  No electrographic seizures.    Activation Procedures:   Hyperventilation was not performed.    Photic stimulation was not performed.     Artifacts:  Intermittent myogenic and movement artifacts were noted.    ECG:  The heart rate on single channel ECG was predominantly between  BPM.    _____________________________________________________________  EEG CLASSIFICATION/SUMMARY:    Abnormal EEG in an unresponsive patient due to moderate to marked generalized slowing.    _____________________________________________________________  CLINICAL IMPRESSION:    Abnormal EEG study in an unresponsive patient.  1. Moderate to marked nonspecific diffuse or multifocal cerebral dysfunction.  2. No epileptic pattern or seizure seen.      Zechariah Robb MD  Attending Physician, [facility] Epilepsy [facility]

## 2018-01-31 NOTE — DIETITIAN INITIAL EVALUATION ADULT. - ORAL INTAKE PTA
good/Family nmembers report that Pt had a good appetite and ate normal Chinese cuisine. Diet recall suggests s diet high in carbohydrates; breakfast - bread roll, tea, brunch - roti, salad, with tea, lunch/dinner - lentils, rice, veggies, snacks on fruit and drinks water and tea throughout the day. good/Family members report that Pt had a good appetite and ate normal [place] cuisine. Diet recall suggests s diet high in carbohydrates; breakfast - bread roll, tea, brunch - roti, salad, with tea, lunch/dinner - lentils, rice, veggies, snacks on fruit and drinks water and tea throughout the day.

## 2018-01-31 NOTE — DIETITIAN INITIAL EVALUATION ADULT. - SOURCE
family/significant other/other (specify)/Electronic medical record: Daughter & brother in-law at bedside Electronic medical record; daughter & brother in-law present at bedside/family/significant other/other (specify)

## 2018-01-31 NOTE — PROGRESS NOTE ADULT - ASSESSMENT
67F w PMH of bilateral Breast CA (on Trastuzumab, last dose Jan 20th) admitted to MICU with influenza PNA c/b PEA arrest x 2 with ROSC x 2 (10 min + 30 min). CPR c/b bilateral PTX (s/p bilateral chest tubes), pneumoperitoneum (s/p Peritoneal drain (pigtail) in right hemiabdomen and paracentesis decompression) a/w extensive subcutaneous emphysema. Pt received 100 mg of empiric TPA in ED. Surgery consulted and not recommending surgical intervention.       Neurologic:  Alteration of mental status present. Likely due to ____ (structural (bleed or stroke), encephalitis, meningitis, non convulsive status epilepticus, metabolic cause (endogenous vs exogenous)    Skin:  Lines: Right IJ  Decubiti:    GI:  Diet:  No GI stress prophylaxis indicated    Metabolic:  BMP showing evidence of   Liver functions tests show   Endocrine abnormalities include  01-31    132<L>  |  97  |  38<H>  ----------------------------<  339<H>  4.4   |  18<L>  |  2.09<H>    Ca    7.5<L>      31 Jan 2018 05:33  Phos  8.5     01-30  Mg     1.6     01-30    TPro  5.4<L>  /  Alb  2.2<L>  /  TBili  0.5  /  DBili  x   /  AST  352<H>  /  ALT  133<H>  /  AlkPhos  92  01-30      Volume Assessment:  Peripheral edema  EVLW on US  No evidence of disturbance of effective circulating volume on US    Hematologic:  CBC results show  Coag panel shows  DVT prophylaxis with                         9.2    23.5  )-----------( 258      ( 31 Jan 2018 05:33 )             26.9     PT/INR - ( 31 Jan 2018 05:33 )   PT: 19.9 sec;   INR: 1.80 ratio         PTT - ( 31 Jan 2018 05:33 )  PTT:34.2 sec    Infectious Disease:    Hemodynamics:  Patient is on pressors due to ____ shock (cardiogenic due to muscle or valve failure, obstructive due to peff, PE, PTX, hypovolemic, vasopegic)     Cardiovascular:  GDE shows:  Lung US exam shows:    Respiratory:  Pt intubated  Lung US shows: 67F w PMH of bilateral Breast CA (on Trastuzumab, last dose Jan 20th) admitted to MICU with influenza PNA c/b PEA arrest x 2 with ROSC x 2 (10 min + 30 min). CPR c/b bilateral PTX (s/p bilateral chest tubes), pneumoperitoneum (s/p Peritoneal drain (pigtail) in right hemiabdomen and paracentesis decompression) a/w extensive subcutaneous emphysema. Pt received 100 mg of empiric TPA in ED. Surgery consulted and not recommending surgical intervention.       Neurologic:  Alteration of mental status present. Likely due to ____ (structural (bleed or stroke), encephalitis, meningitis, non convulsive status epilepticus, metabolic cause (endogenous vs exogenous)    Skin:  Lines: Right IJ  Decubiti:    GI:  Diet: pt NPO  Pt with pneumoperitoneum potentially secondary to pneumothorax communication. Imaging showing no evidence of viscous perforation.     GI stress prophylaxis indicated    Metabolic:  BMP showing evidence of   Liver functions tests show   Endocrine abnormalities include  01-31    132<L>  |  97  |  38<H>  ----------------------------<  339<H>  4.4   |  18<L>  |  2.09<H>    Ca    7.5<L>      31 Jan 2018 05:33  Phos  8.5     01-30  Mg     1.6     01-30    TPro  5.4<L>  /  Alb  2.2<L>  /  TBili  0.5  /  DBili  x   /  AST  352<H>  /  ALT  133<H>  /  AlkPhos  92  01-30      Volume Assessment:  Peripheral edema  EVLW on US  No evidence of disturbance of effective circulating volume on US    Hematologic:  CBC results show  Coag panel shows  DVT prophylaxis with                         9.2    23.5  )-----------( 258      ( 31 Jan 2018 05:33 )             26.9     PT/INR - ( 31 Jan 2018 05:33 )   PT: 19.9 sec;   INR: 1.80 ratio         PTT - ( 31 Jan 2018 05:33 )  PTT:34.2 sec    Infectious Disease:    Hemodynamics:  Patient is on pressors due to ____ shock (cardiogenic due to muscle or valve failure, obstructive due to peff, PE, PTX, hypovolemic, vasopegic)     Cardiovascular:  GDE shows:  Lung US exam shows:    Respiratory:  Pt intubated  Bilateral chest tubes (2 on right and 1 on left w serosanguinous drainage) 67F w PMH of bilateral Breast CA (on Trastuzumab, last dose Jan 20th) admitted to MICU with influenza PNA c/b PEA arrest x 2 with ROSC x 2 (10 min + 30 min). CPR c/b bilateral PTX (s/p bilateral chest tubes), pneumoperitoneum (s/p Peritoneal drain (pigtail) in right hemiabdomen and paracentesis decompression) a/w extensive subcutaneous emphysema. Pt received 100 mg of empiric TPA in ED. Surgery consulted and not recommending surgical intervention.       Neurologic:  Alteration of mental status present. Likely due possible structural anoxic brain injury due to prolonged PEA arrest x2 + CTH after TPA showing evidence of new small acute left PCA territory infarct without hemorrhagic conversion. Pt has been off sedation today.   Will perform serial neuro exams and consider repeat CTH if neuro changes present    Skin:  Lines: Right IJ  Decubiti: None    GI:  Diet: pt NPO  Pt with pneumoperitoneum potentially secondary to pneumothorax communication. Imaging showing no evidence of viscous perforation.   Peritoneal drain (pigtail) in right hemiabdomen without drainage  Surgery recalled about CT abdomen showing evidence of dilated loops of small bowel      GI stress prophylaxis indicated    Metabolic:  BMP showing evidence of   Liver functions tests show   Endocrine abnormalities include  01-31    132<L>  |  97  |  38<H>  ----------------------------<  339<H>  4.4   |  18<L>  |  2.09<H>    Ca    7.5<L>      31 Jan 2018 05:33  Phos  8.5     01-30  Mg     1.6     01-30    TPro  5.4<L>  /  Alb  2.2<L>  /  TBili  0.5  /  DBili  x   /  AST  352<H>  /  ALT  133<H>  /  AlkPhos  92  01-30      Volume Assessment:  No peripheral edema  No EVLW on US  No evidence of disturbance of effective circulating volume on US    Hematologic:  DVT prophylaxis with HSQ                        9.2    23.5  )-----------( 258      ( 31 Jan 2018 05:33 )             26.9     PT/INR - ( 31 Jan 2018 05:33 )   PT: 19.9 sec;   INR: 1.80 ratio         PTT - ( 31 Jan 2018 05:33 )  PTT:34.2 sec    Infectious Disease:    Hemodynamics:  Patient is on pressors due to  obstructive vs vasoplegic 2/2 to sedation effect    Cardiovascular:  GDE unable to perform due subcut emphysema and anterior chest wall bandages.  will order official TTE and follow up with STEPHANIE if unable to assess for obstructive shock      Respiratory:  Pt intubated  Lung US showing focal B lines  Bilateral chest tubes (2 on right and 1 on left w serosanguinous drainage)  CT abdomen showing evidence that chest tubes may be within lung parenchyma. Will f.u CT surgery recs about chest tubes.

## 2018-01-31 NOTE — CONSULT NOTE ADULT - ASSESSMENT
68 yo F with Breast cancer, HTN, presented with the Flu, went into respiratory arrest, then PEA arrest, s/p chest tube, TPA and intubation.   Patient now with Shock liver, ARF, pneumoperitoneum, elevated cardiac enzymes    Pt is oliguric/anuric.  CK is elevated, and rising, concerning for rhabdomyolysis cause nephropathy.  Hypoperfusion during arrest likely cause ATN.  Potential for electrolyte abnormalities.  Pt has linear increase of BUN and Cr. As well as Liver enzymes  Can initiate CVVH. Consent in chart. Risk and benefits discussed with family at bedside.

## 2018-01-31 NOTE — DIETITIAN INITIAL EVALUATION ADULT. - ETIOLOGY
Inadequate caloric-energy consumption 2/2 sedation and intubation Inadequate caloric-energy consumption

## 2018-01-31 NOTE — DIETITIAN INITIAL EVALUATION ADULT. - NS AS NUTRI INTERV ENTERAL NUTRITION
If EN is initiated recommend Jevity 1.2 @ 60ml/hr x 24 hrs for 1728 total kcal, (24kcal/kg), Protein 80 total, (1.1g/kg), Free water is 1008 ml If EN is initiated recommend Vital 1.2 @ 80ml/hr x 18 hrs for 1728 total kcal, (24kcal/kg), Protein 108g total, (1.5g/kg), Free water is 1167.8 ml

## 2018-01-31 NOTE — DIETITIAN INITIAL EVALUATION ADULT. - NUTRITION INTERVENTION
Enteral Nutrition Nutrition - Related Medication Management/Enteral Nutrition Nutrition Education/Enteral Nutrition

## 2018-01-31 NOTE — PROGRESS NOTE ADULT - SUBJECTIVE AND OBJECTIVE BOX
CHIEF COMPLAINT: Influenza PNA, PEA arrest x 2, bilateral PTX, pneumoperitoneum     Interval Events:    REVIEW OF SYSTEMS:  Constitutional: [ ] negative [ ] fevers [ ] chills [ ] weight loss [ ] weight gain  HEENT: [ ] negative [ ] dry eyes [ ] eye irritation [ ] postnasal drip [ ] nasal congestion  CV: [ ] negative  [ ] chest pain [ ] orthopnea [ ] palpitations [ ] murmur  Resp: [ ] negative [ ] cough [ ] shortness of breath [ ] dyspnea [ ] wheezing [ ] sputum [ ] hemoptysis  GI: [ ] negative [ ] nausea [ ] vomiting [ ] diarrhea [ ] constipation [ ] abd pain [ ] dysphagia   : [ ] negative [ ] dysuria [ ] nocturia [ ] hematuria [ ] increased urinary frequency  Musculoskeletal: [ ] negative [ ] back pain [ ] myalgias [ ] arthralgias [ ] fracture  Skin: [ ] negative [ ] rash [ ] itch  Neurological: [ ] negative [ ] headache [ ] dizziness [ ] syncope [ ] weakness [ ] numbness  Psychiatric: [ ] negative [ ] anxiety [ ] depression  Endocrine: [ ] negative [ ] diabetes [ ] thyroid problem  Hematologic/Lymphatic: [ ] negative [ ] anemia [ ] bleeding problem  Allergic/Immunologic: [ ] negative [ ] itchy eyes [ ] nasal discharge [ ] hives [ ] angioedema  [ ] All other systems negative  [x ] Unable to assess ROS because pt intubated and sedated    OBJECTIVE:  ICU Vital Signs Last 24 Hrs  T(C): 36.8 (2018 04:00), Max: 38.1 (2018 11:20)  T(F): 98.2 (2018 04:00), Max: 100.6 (2018 11:20)  HR: 85 (2018 06:15) (78 - 175)  BP: 122/57 (2018 06:15) (48/16 - 213/101)  BP(mean): 82 (2018 06:15) (52 - 112)  ABP: --  ABP(mean): --  RR: 0 (2018 06:15) (0 - 41)  SpO2: 98% (2018 06:15) (81% - 100%)    Mode: AC/ CMV (Assist Control/ Continuous Mandatory Ventilation), RR (machine): 22, TV (machine): 400, FiO2: 40, PEEP: 5, ITime: 1, MAP: 14, PIP: 40     @ 07:01  -   @ 07:00  --------------------------------------------------------  IN: 4916.6 mL / OUT: 217 mL / NET: 4699.6 mL      CAPILLARY BLOOD GLUCOSE      POCT Blood Glucose.: 258 mg/dL (2018 07:03)      PHYSICAL EXAM:  General: NAD  Respiratory: intubated. Bilateral chest tubes (2 on right and 1 on left w serosanguinous drainage)  Cardiovascular: RRR no MRG  Abdominal: Soft, distended. Peritoneal drain (pigtail) in right hemiabdomen connected to vaccuum   Extremities: Warm  Skin: right IJ    LINES:    HOSPITAL MEDICATIONS:  Standing Meds:  ALBUTerol/ipratropium for Nebulization 3 milliLiter(s) Nebulizer every 6 hours  azithromycin  IVPB 500 milliGRAM(s) IV Intermittent every 24 hours  azithromycin  IVPB      dextrose 5%. 1000 milliLiter(s) IV Continuous <Continuous>  dextrose 50% Injectable 12.5 Gram(s) IV Push once  dextrose 50% Injectable 25 Gram(s) IV Push once  dextrose 50% Injectable 25 Gram(s) IV Push once  fentaNYL   Infusion 0.936 MICROgram(s)/kG/Hr IV Continuous <Continuous>  hydrocortisone sodium succinate Injectable 50 milliGRAM(s) IV Push every 6 hours  insulin Infusion 5 Unit(s)/Hr IV Continuous <Continuous>  norepinephrine Infusion 0.01 MICROgram(s)/kG/Min IV Continuous <Continuous>  oseltamivir Suspension 30 milliGRAM(s) Oral every 12 hours  pantoprazole  Injectable 40 milliGRAM(s) IV Push every 12 hours  piperacillin/tazobactam IVPB. 3.375 Gram(s) IV Intermittent every 8 hours  sodium chloride 0.9%. 1000 milliLiter(s) IV Continuous <Continuous>  vancomycin  IVPB 1000 milliGRAM(s) IV Intermittent every 24 hours      PRN Meds:  dextrose Gel 1 Dose(s) Oral once PRN  glucagon  Injectable 1 milliGRAM(s) IntraMuscular once PRN  LORazepam   Injectable 4 milliGRAM(s) IV Push once PRN  midazolam Injectable 2 milliGRAM(s) IV Push every 2 hours PRN      LABS:                        9.2    23.5  )-----------( 258      ( 2018 05:33 )             26.9     Hgb Trend: 9.2<--, 10.6<--, 11.8<--, 13.1<--      132<L>  |  97  |  38<H>  ----------------------------<  339<H>  4.4   |  18<L>  |  2.09<H>    Ca    7.5<L>      2018 05:33  Phos  8.5       Mg     1.6         TPro  5.4<L>  /  Alb  2.2<L>  /  TBili  0.5  /  DBili  x   /  AST  352<H>  /  ALT  133<H>  /  AlkPhos  92      Creatinine Trend: 2.09<--, 1.67<--, 1.44<--, 0.98<--  PT/INR - ( 2018 05:33 )   PT: 19.9 sec;   INR: 1.80 ratio         PTT - ( 2018 05:33 )  PTT:34.2 sec  Urinalysis Basic - ( 2018 12:52 )    Color: Yellow / Appearance: SL Turbid / S.025 / pH: x  Gluc: x / Ketone: Negative  / Bili: Negative / Urobili: Negative   Blood: x / Protein: 150 mg/dL / Nitrite: Negative   Leuk Esterase: Negative / RBC: 3-5 /HPF / WBC x   Sq Epi: x / Non Sq Epi: OCC /HPF / Bacteria: x      Arterial Blood Gas:   @ 19:05  7.23/48/115/20/98/-7.6  ABG lactate: --    Venous Blood Gas:   @ 05:30  7.17/63/36/22/62  VBG Lactate: 2.3  Venous Blood Gas:   @ 03:01  7.13/71/36/23/56  VBG Lactate: 2.7  Venous Blood Gas:   @ 23:26  7.16/66/35/23/53  VBG Lactate: 2.5  Venous Blood Gas:   @ 12:06  7.36/50/46/28/78  VBG Lactate: 2.2      MICROBIOLOGY:     RADIOLOGY:  [ ] Reviewed and interpreted by me    EKG: CHIEF COMPLAINT: Influenza PNA, PEA arrest x 2, bilateral PTX, pneumoperitoneum     Interval Events: pt now opening eyes however not following commands    REVIEW OF SYSTEMS:  Constitutional: [ ] negative [ ] fevers [ ] chills [ ] weight loss [ ] weight gain  HEENT: [ ] negative [ ] dry eyes [ ] eye irritation [ ] postnasal drip [ ] nasal congestion  CV: [ ] negative  [ ] chest pain [ ] orthopnea [ ] palpitations [ ] murmur  Resp: [ ] negative [ ] cough [ ] shortness of breath [ ] dyspnea [ ] wheezing [ ] sputum [ ] hemoptysis  GI: [ ] negative [ ] nausea [ ] vomiting [ ] diarrhea [ ] constipation [ ] abd pain [ ] dysphagia   : [ ] negative [ ] dysuria [ ] nocturia [ ] hematuria [ ] increased urinary frequency  Musculoskeletal: [ ] negative [ ] back pain [ ] myalgias [ ] arthralgias [ ] fracture  Skin: [ ] negative [ ] rash [ ] itch  Neurological: [ ] negative [ ] headache [ ] dizziness [ ] syncope [ ] weakness [ ] numbness  Psychiatric: [ ] negative [ ] anxiety [ ] depression  Endocrine: [ ] negative [ ] diabetes [ ] thyroid problem  Hematologic/Lymphatic: [ ] negative [ ] anemia [ ] bleeding problem  Allergic/Immunologic: [ ] negative [ ] itchy eyes [ ] nasal discharge [ ] hives [ ] angioedema  [ ] All other systems negative  [x ] Unable to assess ROS because pt intubated and sedated    OBJECTIVE:  ICU Vital Signs Last 24 Hrs  T(C): 36.8 (2018 04:00), Max: 38.1 (2018 11:20)  T(F): 98.2 (2018 04:00), Max: 100.6 (2018 11:20)  HR: 85 (2018 06:15) (78 - 175)  BP: 122/57 (2018 06:15) (48/16 - 213/101)  BP(mean): 82 (2018 06:15) (52 - 112)  ABP: --  ABP(mean): --  RR: 0 (2018 06:15) (0 - 41)  SpO2: 98% (2018 06:15) (81% - 100%)    Mode: AC/ CMV (Assist Control/ Continuous Mandatory Ventilation), RR (machine): 22, TV (machine): 400, FiO2: 40, PEEP: 5, ITime: 1, MAP: 14, PIP: 40     @ 07:01  -   @ 07:00  --------------------------------------------------------  IN: 4916.6 mL / OUT: 217 mL / NET: 4699.6 mL      CAPILLARY BLOOD GLUCOSE      POCT Blood Glucose.: 258 mg/dL (2018 07:03)      PHYSICAL EXAM:  General: NAD  Respiratory: intubated. Bilateral chest tubes (2 on right and 1 on left w serosanguinous drainage)  Cardiovascular: RRR no MRG  Abdominal: Soft, distended. Peritoneal drain (pigtail) in right hemiabdomen connected to vaccuum   Extremities: Warm  Skin: right IJ    LINES:    HOSPITAL MEDICATIONS:  Standing Meds:  ALBUTerol/ipratropium for Nebulization 3 milliLiter(s) Nebulizer every 6 hours  azithromycin  IVPB 500 milliGRAM(s) IV Intermittent every 24 hours  azithromycin  IVPB      dextrose 5%. 1000 milliLiter(s) IV Continuous <Continuous>  dextrose 50% Injectable 12.5 Gram(s) IV Push once  dextrose 50% Injectable 25 Gram(s) IV Push once  dextrose 50% Injectable 25 Gram(s) IV Push once  fentaNYL   Infusion 0.936 MICROgram(s)/kG/Hr IV Continuous <Continuous>  hydrocortisone sodium succinate Injectable 50 milliGRAM(s) IV Push every 6 hours  insulin Infusion 5 Unit(s)/Hr IV Continuous <Continuous>  norepinephrine Infusion 0.01 MICROgram(s)/kG/Min IV Continuous <Continuous>  oseltamivir Suspension 30 milliGRAM(s) Oral every 12 hours  pantoprazole  Injectable 40 milliGRAM(s) IV Push every 12 hours  piperacillin/tazobactam IVPB. 3.375 Gram(s) IV Intermittent every 8 hours  sodium chloride 0.9%. 1000 milliLiter(s) IV Continuous <Continuous>  vancomycin  IVPB 1000 milliGRAM(s) IV Intermittent every 24 hours      PRN Meds:  dextrose Gel 1 Dose(s) Oral once PRN  glucagon  Injectable 1 milliGRAM(s) IntraMuscular once PRN  LORazepam   Injectable 4 milliGRAM(s) IV Push once PRN  midazolam Injectable 2 milliGRAM(s) IV Push every 2 hours PRN      LABS:                        9.2    23.5  )-----------( 258      ( 2018 05:33 )             26.9     Hgb Trend: 9.2<--, 10.6<--, 11.8<--, 13.1<--      132<L>  |  97  |  38<H>  ----------------------------<  339<H>  4.4   |  18<L>  |  2.09<H>    Ca    7.5<L>      2018 05:33  Phos  8.5       Mg     1.6         TPro  5.4<L>  /  Alb  2.2<L>  /  TBili  0.5  /  DBili  x   /  AST  352<H>  /  ALT  133<H>  /  AlkPhos  92      Creatinine Trend: 2.09<--, 1.67<--, 1.44<--, 0.98<--  PT/INR - ( 2018 05:33 )   PT: 19.9 sec;   INR: 1.80 ratio         PTT - ( 2018 05:33 )  PTT:34.2 sec  Urinalysis Basic - ( 2018 12:52 )    Color: Yellow / Appearance: SL Turbid / S.025 / pH: x  Gluc: x / Ketone: Negative  / Bili: Negative / Urobili: Negative   Blood: x / Protein: 150 mg/dL / Nitrite: Negative   Leuk Esterase: Negative / RBC: 3-5 /HPF / WBC x   Sq Epi: x / Non Sq Epi: OCC /HPF / Bacteria: x      Arterial Blood Gas:   @ 19:05  7.23/48/115/20/98/-7.6  ABG lactate: --    Venous Blood Gas:   @ 05:30  7.17/63/36/22/62  VBG Lactate: 2.3  Venous Blood Gas:   @ 03:01  7.13/71/36/23/56  VBG Lactate: 2.7  Venous Blood Gas:   @ 23:26  7.16/66/35/23/53  VBG Lactate: 2.5  Venous Blood Gas:   @ 12:06  7.36/50/46/  VBG Lactate: 2.2

## 2018-01-31 NOTE — DIETITIAN INITIAL EVALUATION ADULT. - FACTORS AFF FOOD INTAKE
Holiness/ethnic/cultural/personal food preferences/Vegetarian (Amish) No seafood, meats, or eggs Vegetarian (Orthodoxy) no seafood, meats, or eggs/Holiness/ethnic/cultural/personal food preferences

## 2018-01-31 NOTE — PROGRESS NOTE ADULT - ASSESSMENT
67yF w/ Pneumoperitoneum    - No evidence of PO contrast extrav seen on CT scan  - F/u final read of CT  - Pneumoperitoneum potentially secondary to pneumothorax communication via bare area of the liver  - Abdominal exam incongruous with hollow viscus perforation  - Recommend removal of percutaneous drain in subq space as it is not therapeutic  - No acute surgical intervention at this time  - Will continue to follow  - Please page [phone] w/ any questions    EUGENE Cole PGY-2

## 2018-01-31 NOTE — PROGRESS NOTE ADULT - SUBJECTIVE AND OBJECTIVE BOX
SURGERY PROGRESS NOTE:    ===============================================  Acute Care Surgery and Trauma Surgery ([facility]) Pager [phone]  ===============================================    Subjective:  Pt intubated and sedated. Has remained stable in MICU.      Objective:    PE:  Gen: Critically ill  Resp: Intubated  CVS: RRR  Abd: Soft, Obese, Minimally distended, NT, no rebound or guarding, Drain in R side of abdomen w/o output  Ext: no edema, WWP, crepitus appreciated throughout extremities  Neuro: Sedated, does not respond to pain    Vital Signs Last 24 Hrs  T(C): 37.4 (2018 07:45), Max: 38.1 (2018 11:20)  T(F): 99.4 (2018 07:45), Max: 100.6 (2018 11:20)  HR: 83 (2018 08:09) (78 - 175)  BP: 118/58 (2018 08:00) (48/16 - 213/101)  BP(mean): 83 (2018 08:00) (52 - 112)  RR: 22 (2018 08:00) (0 - 41)  SpO2: 98% (2018 08:09) (81% - 100%)    I&O's Detail    2018 07:01  -  2018 07:00  --------------------------------------------------------  IN:    Enteral Tube Flush: 780 mL    fentaNYL  Infusion: 134.4 mL    fentaNYL  Infusion: 10.5 mL    FFP (Fresh Frozen Plasma): 250 mL    insulin Infusion: 17 mL    IV PiggyBack: 925 mL    lactated ringers: 125 mL    norepinephrine Infusion: 1379.7 mL    Platelets - Single Donor: 300 mL    sodium chloride 0.9%.: 500 mL    Solution: 100 mL    Solution: 400 mL  Total IN: 4921.6 mL    OUT:    Chest Tube: 68 mL    Chest Tube: 7 mL    Chest Tube: 7 mL    Indwelling Catheter - Urethral: 135 mL  Total OUT: 217 mL    Total NET: 4704.6 mL      2018 07:01  -  2018 09:52  --------------------------------------------------------  IN:    fentaNYL  Infusion: 11.2 mL    insulin Infusion: 5 mL    IV PiggyBack: 25 mL    norepinephrine Infusion: 56 mL    sodium chloride 0.9%.: 100 mL  Total IN: 197.2 mL    OUT:    Indwelling Catheter - Urethral: 10 mL  Total OUT: 10 mL    Total NET: 187.2 mL          Daily Height in cm: 160.02 (2018 18:15)    Daily Weight in k.2 (2018 04:00)    MEDICATIONS  (STANDING):  ALBUTerol/ipratropium for Nebulization 3 milliLiter(s) Nebulizer every 6 hours  azithromycin  IVPB 500 milliGRAM(s) IV Intermittent every 24 hours  azithromycin  IVPB      dextrose 5%. 1000 milliLiter(s) (50 mL/Hr) IV Continuous <Continuous>  dextrose 50% Injectable 12.5 Gram(s) IV Push once  dextrose 50% Injectable 25 Gram(s) IV Push once  dextrose 50% Injectable 25 Gram(s) IV Push once  fentaNYL   Infusion 0.936 MICROgram(s)/kG/Hr (3.5 mL/Hr) IV Continuous <Continuous>  hydrocortisone sodium succinate Injectable 50 milliGRAM(s) IV Push every 6 hours  norepinephrine Infusion 0.01 MICROgram(s)/kG/Min (1.313 mL/Hr) IV Continuous <Continuous>  oseltamivir Suspension 30 milliGRAM(s) Oral every 12 hours  pantoprazole  Injectable 40 milliGRAM(s) IV Push every 12 hours  piperacillin/tazobactam IVPB. 3.375 Gram(s) IV Intermittent every 8 hours  vancomycin  IVPB 1000 milliGRAM(s) IV Intermittent every 24 hours    MEDICATIONS  (PRN):  dextrose Gel 1 Dose(s) Oral once PRN Blood Glucose LESS THAN 70 milliGRAM(s)/deciliter  glucagon  Injectable 1 milliGRAM(s) IntraMuscular once PRN Glucose LESS THAN 70 milligrams/deciliter  LORazepam   Injectable 4 milliGRAM(s) IV Push once PRN Agitation  midazolam Injectable 2 milliGRAM(s) IV Push every 2 hours PRN Agitation      LABS:                        9.2    23.5  )-----------( 258      ( 2018 05:33 )             26.9         132<L>  |  97  |  38<H>  ----------------------------<  339<H>  4.4   |  18<L>  |  2.09<H>    Ca    7.5<L>      2018 05:33  Phos  8.5       Mg     1.6         TPro  5.4<L>  /  Alb  2.2<L>  /  TBili  0.5  /  DBili  x   /  AST  352<H>  /  ALT  133<H>  /  AlkPhos  92      PT/INR - ( 2018 05:33 )   PT: 19.9 sec;   INR: 1.80 ratio         PTT - ( 2018 05:33 )  PTT:34.2 sec  Urinalysis Basic - ( 2018 12:52 )    Color: Yellow / Appearance: SL Turbid / S.025 / pH: x  Gluc: x / Ketone: Negative  / Bili: Negative / Urobili: Negative   Blood: x / Protein: 150 mg/dL / Nitrite: Negative   Leuk Esterase: Negative / RBC: 3-5 /HPF / WBC x   Sq Epi: x / Non Sq Epi: OCC /HPF / Bacteria: x        RADIOLOGY & ADDITIONAL STUDIES:  < from: CT Abdomen and Pelvis w/ Oral Cont (18 @ 03:22) >  INTERPRETATION:  - ETT tip at proximal right mainstem bronchus  - Multifocal patchy bilateral opacities consistent with multifocal   infection.  - Bilateral chest tubes are in place. No pneumothorax.  - Large amount of pneumoperitoneum.  - Extensive subcutaneous air tracking from the chest, abdomen, pelvis   into the bilateral lower extremities.    < end of copied text >

## 2018-01-31 NOTE — DIETITIAN INITIAL EVALUATION ADULT. - OTHER INFO
Pt seen for length of stay. Daughter reports good appetite PTA, with illness the day before admission with nausea, vomiting, unaware of last BM. Pt had nausea and vomiting, with some diarrhea due to breast Ca and Herceptin treatment which (last dose 1/20/18). Daughter states  fingersticks in the mornings, with ranges from 105-180 mg/dL, but normally between 120-130mg/dL - addressed with insulin 70/30 50 units for breakfast and 40 units at dinner with metformin 3x daily. Pt supplements with iron, Omega 3, 6, & 9, vit. C, Calcium, and vit. B complex. Daughter denies any known food allergies. Pt seen for length of stay. Daughter reports good appetite PTA, with illness the day before admission presented with fever, nausea, vomiting. Daughter unaware of last BM. Pt had nausea and vomiting, with some diarrhea due to breast Ca and Herceptin treatment which (last dose 1/20/18). Daughter states  fingersticks in the mornings, with ranges from 105-180 mg/dL, but normally between 120-130mg/dL - addressed with insulin 70/30 50 units for breakfast and 40 units at dinner with metformin 3x daily. Pt supplements with iron, Omega 3, 6, & 9, vit. C, Calcium, and vit. B complex. Daughter denies any known food allergies.

## 2018-02-01 DIAGNOSIS — N17.9 ACUTE KIDNEY FAILURE, UNSPECIFIED: ICD-10-CM

## 2018-02-01 LAB
ALBUMIN SERPL ELPH-MCNC: 2.3 G/DL — LOW (ref 3.3–5)
ALBUMIN SERPL ELPH-MCNC: 2.6 G/DL — LOW (ref 3.3–5)
ALBUMIN SERPL ELPH-MCNC: 2.6 G/DL — LOW (ref 3.3–5)
ALP SERPL-CCNC: 67 U/L — SIGNIFICANT CHANGE UP (ref 40–120)
ALP SERPL-CCNC: 76 U/L — SIGNIFICANT CHANGE UP (ref 40–120)
ALP SERPL-CCNC: 79 U/L — SIGNIFICANT CHANGE UP (ref 40–120)
ALT FLD-CCNC: 1860 U/L RC — HIGH (ref 10–45)
ALT FLD-CCNC: 2916 U/L RC — HIGH (ref 10–45)
ALT FLD-CCNC: 3561 U/L RC — HIGH (ref 10–45)
ANION GAP SERPL CALC-SCNC: 20 MMOL/L — HIGH (ref 5–17)
ANION GAP SERPL CALC-SCNC: 21 MMOL/L — HIGH (ref 5–17)
APTT BLD: 32.5 SEC — SIGNIFICANT CHANGE UP (ref 27.5–37.4)
APTT BLD: 32.7 SEC — SIGNIFICANT CHANGE UP (ref 27.5–37.4)
APTT BLD: 35 SEC — SIGNIFICANT CHANGE UP (ref 27.5–37.4)
AST SERPL-CCNC: 142 U/L — HIGH (ref 10–40)
AST SERPL-CCNC: <5 U/L — LOW (ref 10–40)
AST SERPL-CCNC: >7000 U/L — HIGH (ref 10–40)
BASE EXCESS BLDV CALC-SCNC: -3.7 MMOL/L — LOW (ref -2–2)
BASE EXCESS BLDV CALC-SCNC: -6.8 MMOL/L — LOW (ref -2–2)
BASE EXCESS BLDV CALC-SCNC: -9.8 MMOL/L — LOW (ref -2–2)
BILIRUB SERPL-MCNC: 0.7 MG/DL — SIGNIFICANT CHANGE UP (ref 0.2–1.2)
BILIRUB SERPL-MCNC: 0.8 MG/DL — SIGNIFICANT CHANGE UP (ref 0.2–1.2)
BILIRUB SERPL-MCNC: 1 MG/DL — SIGNIFICANT CHANGE UP (ref 0.2–1.2)
BUN SERPL-MCNC: 45 MG/DL — HIGH (ref 7–23)
BUN SERPL-MCNC: 54 MG/DL — HIGH (ref 7–23)
BUN SERPL-MCNC: 61 MG/DL — HIGH (ref 7–23)
CA-I SERPL-SCNC: 1.02 MMOL/L — LOW (ref 1.12–1.3)
CA-I SERPL-SCNC: 1.03 MMOL/L — LOW (ref 1.12–1.3)
CALCIUM SERPL-MCNC: 7.3 MG/DL — LOW (ref 8.4–10.5)
CALCIUM SERPL-MCNC: 7.4 MG/DL — LOW (ref 8.4–10.5)
CALCIUM SERPL-MCNC: 7.9 MG/DL — LOW (ref 8.4–10.5)
CHLORIDE BLDV-SCNC: 100 MMOL/L — SIGNIFICANT CHANGE UP (ref 96–108)
CHLORIDE BLDV-SCNC: 102 MMOL/L — SIGNIFICANT CHANGE UP (ref 96–108)
CHLORIDE SERPL-SCNC: 95 MMOL/L — LOW (ref 96–108)
CHLORIDE SERPL-SCNC: 96 MMOL/L — SIGNIFICANT CHANGE UP (ref 96–108)
CHLORIDE SERPL-SCNC: 97 MMOL/L — SIGNIFICANT CHANGE UP (ref 96–108)
CHOLEST SERPL-MCNC: 149 MG/DL — SIGNIFICANT CHANGE UP (ref 10–199)
CK MB BLD-MCNC: 0.5 % — SIGNIFICANT CHANGE UP (ref 0–3.5)
CK MB BLD-MCNC: 0.6 % — SIGNIFICANT CHANGE UP (ref 0–3.5)
CK MB BLD-MCNC: 0.6 % — SIGNIFICANT CHANGE UP (ref 0–3.5)
CK MB CFR SERPL CALC: 5.1 NG/ML — HIGH (ref 0–3.8)
CK MB CFR SERPL CALC: 5.3 NG/ML — HIGH (ref 0–3.8)
CK MB CFR SERPL CALC: 5.8 NG/ML — HIGH (ref 0–3.8)
CK SERPL-CCNC: 1019 U/L — HIGH (ref 25–170)
CK SERPL-CCNC: 820 U/L — HIGH (ref 25–170)
CK SERPL-CCNC: 934 U/L — HIGH (ref 25–170)
CO2 BLDV-SCNC: 18 MMOL/L — LOW (ref 22–30)
CO2 BLDV-SCNC: 21 MMOL/L — LOW (ref 22–30)
CO2 BLDV-SCNC: 23 MMOL/L — SIGNIFICANT CHANGE UP (ref 22–30)
CO2 SERPL-SCNC: 17 MMOL/L — LOW (ref 22–31)
CO2 SERPL-SCNC: 18 MMOL/L — LOW (ref 22–31)
CO2 SERPL-SCNC: 18 MMOL/L — LOW (ref 22–31)
CREAT SERPL-MCNC: 3.03 MG/DL — HIGH (ref 0.5–1.3)
CREAT SERPL-MCNC: 3.54 MG/DL — HIGH (ref 0.5–1.3)
CREAT SERPL-MCNC: 4.04 MG/DL — HIGH (ref 0.5–1.3)
GAS PNL BLDA: SIGNIFICANT CHANGE UP
GAS PNL BLDA: SIGNIFICANT CHANGE UP
GAS PNL BLDV: 131 MMOL/L — LOW (ref 136–145)
GAS PNL BLDV: 131 MMOL/L — LOW (ref 136–145)
GAS PNL BLDV: SIGNIFICANT CHANGE UP
GLUCOSE BLDC GLUCOMTR-MCNC: 172 MG/DL — HIGH (ref 70–99)
GLUCOSE BLDC GLUCOMTR-MCNC: 211 MG/DL — HIGH (ref 70–99)
GLUCOSE BLDC GLUCOMTR-MCNC: 213 MG/DL — HIGH (ref 70–99)
GLUCOSE BLDC GLUCOMTR-MCNC: 217 MG/DL — HIGH (ref 70–99)
GLUCOSE BLDV-MCNC: 185 MG/DL — HIGH (ref 70–99)
GLUCOSE BLDV-MCNC: 229 MG/DL — HIGH (ref 70–99)
GLUCOSE SERPL-MCNC: 179 MG/DL — HIGH (ref 70–99)
GLUCOSE SERPL-MCNC: 197 MG/DL — HIGH (ref 70–99)
GLUCOSE SERPL-MCNC: 229 MG/DL — HIGH (ref 70–99)
GRAM STN FLD: SIGNIFICANT CHANGE UP
HCO3 BLDV-SCNC: 16 MMOL/L — LOW (ref 21–29)
HCO3 BLDV-SCNC: 19 MMOL/L — LOW (ref 21–29)
HCO3 BLDV-SCNC: 22 MMOL/L — SIGNIFICANT CHANGE UP (ref 21–29)
HCT VFR BLD CALC: 25.1 % — LOW (ref 34.5–45)
HCT VFR BLD CALC: 26.1 % — LOW (ref 34.5–45)
HCT VFR BLD CALC: 27.8 % — LOW (ref 34.5–45)
HCT VFR BLDA CALC: 26 % — LOW (ref 39–50)
HCT VFR BLDA CALC: 26 % — LOW (ref 39–50)
HDLC SERPL-MCNC: 8 MG/DL — LOW (ref 40–125)
HGB BLD CALC-MCNC: 8.2 G/DL — LOW (ref 11.5–15.5)
HGB BLD CALC-MCNC: 8.3 G/DL — LOW (ref 11.5–15.5)
HGB BLD-MCNC: 8.6 G/DL — LOW (ref 11.5–15.5)
HGB BLD-MCNC: 9.2 G/DL — LOW (ref 11.5–15.5)
HGB BLD-MCNC: 9.3 G/DL — LOW (ref 11.5–15.5)
HOROWITZ INDEX BLDV+IHG-RTO: 30 — SIGNIFICANT CHANGE UP
HOROWITZ INDEX BLDV+IHG-RTO: 40 — SIGNIFICANT CHANGE UP
INR BLD: 1.23 RATIO — HIGH (ref 0.88–1.16)
INR BLD: 1.43 RATIO — HIGH (ref 0.88–1.16)
INR BLD: 1.78 RATIO — HIGH (ref 0.88–1.16)
LACTATE BLDV-MCNC: 1.5 MMOL/L — SIGNIFICANT CHANGE UP (ref 0.7–2)
LACTATE BLDV-MCNC: 1.6 MMOL/L — SIGNIFICANT CHANGE UP (ref 0.7–2)
LIPID PNL WITH DIRECT LDL SERPL: SIGNIFICANT CHANGE UP
MAGNESIUM SERPL-MCNC: 2.1 MG/DL — SIGNIFICANT CHANGE UP (ref 1.6–2.6)
MAGNESIUM SERPL-MCNC: 2.3 MG/DL — SIGNIFICANT CHANGE UP (ref 1.6–2.6)
MAGNESIUM SERPL-MCNC: 2.4 MG/DL — SIGNIFICANT CHANGE UP (ref 1.6–2.6)
MCHC RBC-ENTMCNC: 30.4 PG — SIGNIFICANT CHANGE UP (ref 27–34)
MCHC RBC-ENTMCNC: 30.5 PG — SIGNIFICANT CHANGE UP (ref 27–34)
MCHC RBC-ENTMCNC: 31.1 PG — SIGNIFICANT CHANGE UP (ref 27–34)
MCHC RBC-ENTMCNC: 33.3 GM/DL — SIGNIFICANT CHANGE UP (ref 32–36)
MCHC RBC-ENTMCNC: 34.1 GM/DL — SIGNIFICANT CHANGE UP (ref 32–36)
MCHC RBC-ENTMCNC: 35.1 GM/DL — SIGNIFICANT CHANGE UP (ref 32–36)
MCV RBC AUTO: 88.8 FL — SIGNIFICANT CHANGE UP (ref 80–100)
MCV RBC AUTO: 89.5 FL — SIGNIFICANT CHANGE UP (ref 80–100)
MCV RBC AUTO: 91.2 FL — SIGNIFICANT CHANGE UP (ref 80–100)
OTHER CELLS CSF MANUAL: 8 ML/DL — LOW (ref 18–22)
OTHER CELLS CSF MANUAL: 8 ML/DL — LOW (ref 18–22)
PCO2 BLDV: 40 MMHG — SIGNIFICANT CHANGE UP (ref 35–50)
PCO2 BLDV: 44 MMHG — SIGNIFICANT CHANGE UP (ref 35–50)
PCO2 BLDV: 46 MMHG — SIGNIFICANT CHANGE UP (ref 35–50)
PH BLDV: 7.23 — LOW (ref 7.35–7.45)
PH BLDV: 7.26 — LOW (ref 7.35–7.45)
PH BLDV: 7.3 — LOW (ref 7.35–7.45)
PHOSPHATE SERPL-MCNC: 6.9 MG/DL — HIGH (ref 2.5–4.5)
PHOSPHATE SERPL-MCNC: 8.1 MG/DL — HIGH (ref 2.5–4.5)
PHOSPHATE SERPL-MCNC: 9.7 MG/DL — HIGH (ref 2.5–4.5)
PLATELET # BLD AUTO: 129 K/UL — LOW (ref 150–400)
PLATELET # BLD AUTO: 156 K/UL — SIGNIFICANT CHANGE UP (ref 150–400)
PLATELET # BLD AUTO: 164 K/UL — SIGNIFICANT CHANGE UP (ref 150–400)
PO2 BLDV: 41 MMHG — SIGNIFICANT CHANGE UP (ref 25–45)
PO2 BLDV: 43 MMHG — SIGNIFICANT CHANGE UP (ref 25–45)
PO2 BLDV: 47 MMHG — HIGH (ref 25–45)
POTASSIUM BLDV-SCNC: 4.3 MMOL/L — SIGNIFICANT CHANGE UP (ref 3.5–5)
POTASSIUM BLDV-SCNC: 4.9 MMOL/L — SIGNIFICANT CHANGE UP (ref 3.5–5)
POTASSIUM SERPL-MCNC: 4.2 MMOL/L — SIGNIFICANT CHANGE UP (ref 3.5–5.3)
POTASSIUM SERPL-MCNC: 4.4 MMOL/L — SIGNIFICANT CHANGE UP (ref 3.5–5.3)
POTASSIUM SERPL-MCNC: 5.2 MMOL/L — SIGNIFICANT CHANGE UP (ref 3.5–5.3)
POTASSIUM SERPL-SCNC: 4.2 MMOL/L — SIGNIFICANT CHANGE UP (ref 3.5–5.3)
POTASSIUM SERPL-SCNC: 4.4 MMOL/L — SIGNIFICANT CHANGE UP (ref 3.5–5.3)
POTASSIUM SERPL-SCNC: 5.2 MMOL/L — SIGNIFICANT CHANGE UP (ref 3.5–5.3)
PROT SERPL-MCNC: 5.7 G/DL — LOW (ref 6–8.3)
PROT SERPL-MCNC: 6.3 G/DL — SIGNIFICANT CHANGE UP (ref 6–8.3)
PROT SERPL-MCNC: 6.4 G/DL — SIGNIFICANT CHANGE UP (ref 6–8.3)
PROTHROM AB SERPL-ACNC: 13.3 SEC — HIGH (ref 9.8–12.7)
PROTHROM AB SERPL-ACNC: 15.6 SEC — HIGH (ref 9.8–12.7)
PROTHROM AB SERPL-ACNC: 19.5 SEC — HIGH (ref 9.8–12.7)
RBC # BLD: 2.81 M/UL — LOW (ref 3.8–5.2)
RBC # BLD: 2.94 M/UL — LOW (ref 3.8–5.2)
RBC # BLD: 3.05 M/UL — LOW (ref 3.8–5.2)
RBC # FLD: 12 % — SIGNIFICANT CHANGE UP (ref 10.3–14.5)
RBC # FLD: 12.1 % — SIGNIFICANT CHANGE UP (ref 10.3–14.5)
RBC # FLD: 12.2 % — SIGNIFICANT CHANGE UP (ref 10.3–14.5)
SAO2 % BLDV: 68 % — SIGNIFICANT CHANGE UP (ref 67–88)
SAO2 % BLDV: 68 % — SIGNIFICANT CHANGE UP (ref 67–88)
SAO2 % BLDV: 70 % — SIGNIFICANT CHANGE UP (ref 67–88)
SODIUM SERPL-SCNC: 131 MMOL/L — LOW (ref 135–145)
SODIUM SERPL-SCNC: 133 MMOL/L — LOW (ref 135–145)
SODIUM SERPL-SCNC: 136 MMOL/L — SIGNIFICANT CHANGE UP (ref 135–145)
SPECIMEN SOURCE: SIGNIFICANT CHANGE UP
TOTAL CHOLESTEROL/HDL RATIO MEASUREMENT: 18.6 RATIO — SIGNIFICANT CHANGE UP (ref 3.3–7.1)
TRIGL SERPL-MCNC: 499 MG/DL — HIGH (ref 10–149)
TROPONIN T SERPL-MCNC: 0.08 NG/ML — HIGH (ref 0–0.06)
VANCOMYCIN FLD-MCNC: 11.9 UG/ML — SIGNIFICANT CHANGE UP
WBC # BLD: 11.4 K/UL — HIGH (ref 3.8–10.5)
WBC # BLD: 7.3 K/UL — SIGNIFICANT CHANGE UP (ref 3.8–10.5)
WBC # BLD: 9.4 K/UL — SIGNIFICANT CHANGE UP (ref 3.8–10.5)
WBC # FLD AUTO: 11.4 K/UL — HIGH (ref 3.8–10.5)
WBC # FLD AUTO: 7.3 K/UL — SIGNIFICANT CHANGE UP (ref 3.8–10.5)
WBC # FLD AUTO: 9.4 K/UL — SIGNIFICANT CHANGE UP (ref 3.8–10.5)

## 2018-02-01 PROCEDURE — 99291 CRITICAL CARE FIRST HOUR: CPT | Mod: 25

## 2018-02-01 PROCEDURE — 36500 INSERTION OF CATHETER VEIN: CPT | Mod: GC

## 2018-02-01 PROCEDURE — 95951: CPT | Mod: 26,52

## 2018-02-01 PROCEDURE — 75557 CARDIAC MRI FOR MORPH: CPT | Mod: 26

## 2018-02-01 PROCEDURE — 93308 TTE F-UP OR LMTD: CPT | Mod: 26

## 2018-02-01 PROCEDURE — 93321 DOPPLER ECHO F-UP/LMTD STD: CPT | Mod: 26

## 2018-02-01 PROCEDURE — 71045 X-RAY EXAM CHEST 1 VIEW: CPT | Mod: 26

## 2018-02-01 PROCEDURE — 99222 1ST HOSP IP/OBS MODERATE 55: CPT | Mod: GC

## 2018-02-01 RX ORDER — FENTANYL CITRATE 50 UG/ML
25 INJECTION INTRAVENOUS ONCE
Qty: 0 | Refills: 0 | Status: DISCONTINUED | OUTPATIENT
Start: 2018-02-01 | End: 2018-02-01

## 2018-02-01 RX ORDER — IPRATROPIUM/ALBUTEROL SULFATE 18-103MCG
3 AEROSOL WITH ADAPTER (GRAM) INHALATION ONCE
Qty: 0 | Refills: 0 | Status: COMPLETED | OUTPATIENT
Start: 2018-02-01 | End: 2018-02-01

## 2018-02-01 RX ORDER — PROPOFOL 10 MG/ML
10 INJECTION, EMULSION INTRAVENOUS
Qty: 500 | Refills: 0 | Status: DISCONTINUED | OUTPATIENT
Start: 2018-02-01 | End: 2018-02-03

## 2018-02-01 RX ORDER — DEXTROSE 50 % IN WATER 50 %
1 SYRINGE (ML) INTRAVENOUS ONCE
Qty: 0 | Refills: 0 | Status: DISCONTINUED | OUTPATIENT
Start: 2018-02-01 | End: 2018-02-01

## 2018-02-01 RX ORDER — DEXTROSE 50 % IN WATER 50 %
25 SYRINGE (ML) INTRAVENOUS ONCE
Qty: 0 | Refills: 0 | Status: DISCONTINUED | OUTPATIENT
Start: 2018-02-01 | End: 2018-02-01

## 2018-02-01 RX ORDER — INSULIN HUMAN 100 [IU]/ML
INJECTION, SOLUTION SUBCUTANEOUS EVERY 6 HOURS
Qty: 0 | Refills: 0 | Status: DISCONTINUED | OUTPATIENT
Start: 2018-02-01 | End: 2018-02-01

## 2018-02-01 RX ORDER — INSULIN GLARGINE 100 [IU]/ML
15 INJECTION, SOLUTION SUBCUTANEOUS AT BEDTIME
Qty: 0 | Refills: 0 | Status: DISCONTINUED | OUTPATIENT
Start: 2018-02-01 | End: 2018-02-04

## 2018-02-01 RX ORDER — INSULIN LISPRO 100/ML
VIAL (ML) SUBCUTANEOUS EVERY 6 HOURS
Qty: 0 | Refills: 0 | Status: DISCONTINUED | OUTPATIENT
Start: 2018-02-01 | End: 2018-02-05

## 2018-02-01 RX ORDER — DEXTROSE 50 % IN WATER 50 %
12.5 SYRINGE (ML) INTRAVENOUS ONCE
Qty: 0 | Refills: 0 | Status: DISCONTINUED | OUTPATIENT
Start: 2018-02-01 | End: 2018-02-01

## 2018-02-01 RX ORDER — PIPERACILLIN AND TAZOBACTAM 4; .5 G/20ML; G/20ML
3.38 INJECTION, POWDER, LYOPHILIZED, FOR SOLUTION INTRAVENOUS EVERY 12 HOURS
Qty: 0 | Refills: 0 | Status: DISCONTINUED | OUTPATIENT
Start: 2018-02-01 | End: 2018-02-01

## 2018-02-01 RX ORDER — SODIUM CHLORIDE 9 MG/ML
1000 INJECTION, SOLUTION INTRAVENOUS
Qty: 0 | Refills: 0 | Status: DISCONTINUED | OUTPATIENT
Start: 2018-02-01 | End: 2018-02-01

## 2018-02-01 RX ORDER — VANCOMYCIN HCL 1 G
500 VIAL (EA) INTRAVENOUS ONCE
Qty: 0 | Refills: 0 | Status: COMPLETED | OUTPATIENT
Start: 2018-02-01 | End: 2018-02-01

## 2018-02-01 RX ORDER — DEXTROSE 50 % IN WATER 50 %
25 SYRINGE (ML) INTRAVENOUS ONCE
Qty: 0 | Refills: 0 | Status: DISCONTINUED | OUTPATIENT
Start: 2018-02-01 | End: 2018-02-04

## 2018-02-01 RX ORDER — ASPIRIN/CALCIUM CARB/MAGNESIUM 324 MG
81 TABLET ORAL DAILY
Qty: 0 | Refills: 0 | Status: DISCONTINUED | OUTPATIENT
Start: 2018-02-01 | End: 2018-02-03

## 2018-02-01 RX ORDER — INSULIN LISPRO 100/ML
VIAL (ML) SUBCUTANEOUS
Qty: 0 | Refills: 0 | Status: DISCONTINUED | OUTPATIENT
Start: 2018-02-01 | End: 2018-02-01

## 2018-02-01 RX ORDER — PIPERACILLIN AND TAZOBACTAM 4; .5 G/20ML; G/20ML
3.38 INJECTION, POWDER, LYOPHILIZED, FOR SOLUTION INTRAVENOUS EVERY 12 HOURS
Qty: 0 | Refills: 0 | Status: DISCONTINUED | OUTPATIENT
Start: 2018-02-01 | End: 2018-02-07

## 2018-02-01 RX ORDER — FENTANYL CITRATE 50 UG/ML
50 INJECTION INTRAVENOUS ONCE
Qty: 0 | Refills: 0 | Status: DISCONTINUED | OUTPATIENT
Start: 2018-02-01 | End: 2018-02-01

## 2018-02-01 RX ORDER — GLUCAGON INJECTION, SOLUTION 0.5 MG/.1ML
1 INJECTION, SOLUTION SUBCUTANEOUS ONCE
Qty: 0 | Refills: 0 | Status: DISCONTINUED | OUTPATIENT
Start: 2018-02-01 | End: 2018-02-01

## 2018-02-01 RX ADMIN — Medication 1.31 MICROGRAM(S)/KG/MIN: at 20:18

## 2018-02-01 RX ADMIN — FENTANYL CITRATE 25 MICROGRAM(S): 50 INJECTION INTRAVENOUS at 06:37

## 2018-02-01 RX ADMIN — HEPARIN SODIUM 5000 UNIT(S): 5000 INJECTION INTRAVENOUS; SUBCUTANEOUS at 17:23

## 2018-02-01 RX ADMIN — FENTANYL CITRATE 50 MICROGRAM(S): 50 INJECTION INTRAVENOUS at 17:24

## 2018-02-01 RX ADMIN — MIDAZOLAM HYDROCHLORIDE 2 MILLIGRAM(S): 1 INJECTION, SOLUTION INTRAMUSCULAR; INTRAVENOUS at 16:25

## 2018-02-01 RX ADMIN — MIDAZOLAM HYDROCHLORIDE 2 MILLIGRAM(S): 1 INJECTION, SOLUTION INTRAMUSCULAR; INTRAVENOUS at 10:30

## 2018-02-01 RX ADMIN — INSULIN HUMAN 2: 100 INJECTION, SOLUTION SUBCUTANEOUS at 08:37

## 2018-02-01 RX ADMIN — PROPOFOL 4.49 MICROGRAM(S)/KG/MIN: 10 INJECTION, EMULSION INTRAVENOUS at 23:18

## 2018-02-01 RX ADMIN — INSULIN GLARGINE 15 UNIT(S): 100 INJECTION, SOLUTION SUBCUTANEOUS at 22:50

## 2018-02-01 RX ADMIN — Medication 2: at 23:34

## 2018-02-01 RX ADMIN — Medication 4: at 17:22

## 2018-02-01 RX ADMIN — Medication 100 MILLIGRAM(S): at 05:17

## 2018-02-01 RX ADMIN — Medication 3 MILLILITER(S): at 23:46

## 2018-02-01 RX ADMIN — HEPARIN SODIUM 5000 UNIT(S): 5000 INJECTION INTRAVENOUS; SUBCUTANEOUS at 01:00

## 2018-02-01 RX ADMIN — PIPERACILLIN AND TAZOBACTAM 25 GRAM(S): 4; .5 INJECTION, POWDER, LYOPHILIZED, FOR SOLUTION INTRAVENOUS at 20:18

## 2018-02-01 RX ADMIN — PIPERACILLIN AND TAZOBACTAM 25 GRAM(S): 4; .5 INJECTION, POWDER, LYOPHILIZED, FOR SOLUTION INTRAVENOUS at 05:18

## 2018-02-01 RX ADMIN — Medication 3 MILLILITER(S): at 20:46

## 2018-02-01 RX ADMIN — Medication 30 MILLIGRAM(S): at 05:18

## 2018-02-01 RX ADMIN — PANTOPRAZOLE SODIUM 40 MILLIGRAM(S): 20 TABLET, DELAYED RELEASE ORAL at 01:05

## 2018-02-01 RX ADMIN — DEXMEDETOMIDINE HYDROCHLORIDE IN 0.9% SODIUM CHLORIDE 2 MICROGRAM(S)/KG/HR: 4 INJECTION INTRAVENOUS at 01:05

## 2018-02-01 RX ADMIN — FENTANYL CITRATE 25 MICROGRAM(S): 50 INJECTION INTRAVENOUS at 07:00

## 2018-02-01 RX ADMIN — HEPARIN SODIUM 5000 UNIT(S): 5000 INJECTION INTRAVENOUS; SUBCUTANEOUS at 23:17

## 2018-02-01 RX ADMIN — FENTANYL CITRATE 25 MICROGRAM(S): 50 INJECTION INTRAVENOUS at 06:22

## 2018-02-01 RX ADMIN — FENTANYL CITRATE 50 MICROGRAM(S): 50 INJECTION INTRAVENOUS at 17:26

## 2018-02-01 RX ADMIN — HEPARIN SODIUM 5000 UNIT(S): 5000 INJECTION INTRAVENOUS; SUBCUTANEOUS at 08:30

## 2018-02-01 RX ADMIN — FENTANYL CITRATE 50 MICROGRAM(S): 50 INJECTION INTRAVENOUS at 17:25

## 2018-02-01 RX ADMIN — FENTANYL CITRATE 25 MICROGRAM(S): 50 INJECTION INTRAVENOUS at 06:45

## 2018-02-01 RX ADMIN — Medication 3 MILLILITER(S): at 17:13

## 2018-02-01 RX ADMIN — Medication 4: at 01:04

## 2018-02-01 RX ADMIN — Medication 3 MILLILITER(S): at 05:36

## 2018-02-01 RX ADMIN — PANTOPRAZOLE SODIUM 40 MILLIGRAM(S): 20 TABLET, DELAYED RELEASE ORAL at 14:59

## 2018-02-01 RX ADMIN — FENTANYL CITRATE 50 MICROGRAM(S): 50 INJECTION INTRAVENOUS at 16:24

## 2018-02-01 RX ADMIN — Medication 30 MILLIGRAM(S): at 17:32

## 2018-02-01 NOTE — CONSULT NOTE ADULT - SUBJECTIVE AND OBJECTIVE BOX
Neurology Consult Note    Name  MILANA BALL    HPI:  68 y/o woman w/ hx Breast Cancer (diagnosed in Feb 2017) currently on Herceptin, HTN, DM II on 70/30 p/w fever, RVP positive for flu on 1/30. PEA arrest with chest compressions for 10min, achieved ROSC after Epi x 2, and was intubated. Patient required re-intubation (improperly intubated initially into the esophagus) after she PEA arrested again, with ROSC achieved after 30min with Epi x 5. Patient was also found to have bilateral pneumothoraces, s/p 3 chest tubes, 2 on the right side, and 1 on the left. Patient also has paracentesis drain for decompression of the abdomen. CTH performed on 1/31, showing interval development of L PCA ischemic infarct. Pt is intubated, has been on sedation precedex. Per family at bedside, pt is intermittently waking up, opening eyes, responding appropriately by nodding her head, making arm motions asking for water, and following commands.     NIHSS 22 MRS 0    Review of Systems:  Unable to obtain 2/2 mental status    MEDICATIONS  (STANDING):  ALBUTerol/ipratropium for Nebulization 3 milliLiter(s) Nebulizer every 6 hours  dexmedetomidine Infusion 0.107 MICROgram(s)/kG/Hr (2 mL/Hr) IV Continuous <Continuous>  dextrose 5%. 1000 milliLiter(s) (50 mL/Hr) IV Continuous <Continuous>  dextrose 50% Injectable 12.5 Gram(s) IV Push once  dextrose 50% Injectable 25 Gram(s) IV Push once  dextrose 50% Injectable 25 Gram(s) IV Push once  heparin  Injectable 5000 Unit(s) SubCutaneous every 8 hours  insulin lispro (HumaLOG) corrective regimen sliding scale   SubCutaneous three times a day before meals  norepinephrine Infusion 0.01 MICROgram(s)/kG/Min (1.313 mL/Hr) IV Continuous <Continuous>  oseltamivir Suspension 30 milliGRAM(s) Oral every 12 hours  pantoprazole  Injectable 40 milliGRAM(s) IV Push every 12 hours    MEDICATIONS  (PRN):  dextrose Gel 1 Dose(s) Oral once PRN Blood Glucose LESS THAN 70 milliGRAM(s)/deciliter  glucagon  Injectable 1 milliGRAM(s) IntraMuscular once PRN Glucose LESS THAN 70 milligrams/deciliter  midazolam Injectable 2 milliGRAM(s) IV Push every 2 hours PRN Agitation    Allergies    NIFEdipine (Urticaria; Hives)  vitamin E (Short breath; Urticaria; Hives)    PAST MEDICAL & SURGICAL HISTORY:  Diabetes  Breast CA  H/O mastectomy, bilateral  No significant past surgical history    FH: NC    SH: Patient lives at home with her . No smoking, alcohol or illicit drug use    Objective:   Vital Signs Last 24 Hrs  T(C): 36.9 (01 Feb 2018 07:00), Max: 37.9 (31 Jan 2018 16:00)  T(F): 98.4 (01 Feb 2018 07:00), Max: 100.2 (31 Jan 2018 16:00)  HR: 83 (01 Feb 2018 14:45) (66 - 94)  BP: 129/60 (01 Feb 2018 14:45) (90/41 - 141/63)  BP(mean): 87 (01 Feb 2018 14:45) (59 - 101)  RR: 24 (01 Feb 2018 14:45) (2 - 31)  SpO2: 99% (01 Feb 2018 14:45) (94% - 100%)    General Exam:   General appearance: Intubated      Neurological Exam: Sedation (precedex) held for neurologic exam    Mental Status: Eyes closed, but opens to voice and tactile stimulus. Not tracking. Follows simple appendicular and midline commands.     Cranial Nerves: Pupil 2 mm with sluggish reaction b/l, eyes midline, no BTT b/l, face grossly symmetric    Motor:   Tone: Decreased throughout  Strength: RUE wrist flexion 2/5, 1/5 biceps, 0/5 elsewhere, 0/5 LUE. LLE PF/DF 2/5, 0/5 in RLE  Tremor: No resting, postural or action tremor.  No myoclonus.    Sensation: Mild grimace to noxious stimulus in b/l LEs    Deep Tendon Reflexes: Absent throughout  Toes mute b/l    Coord: Unable to assess 2/2 weakness and mental status      Other:                        9.3    11.4  )-----------( 164      ( 01 Feb 2018 12:44 )             27.8     02-01    133<L>  |  95<L>  |  61<H>  ----------------------------<  229<H>  5.2   |  18<L>  |  4.04<H>    Ca    7.4<L>      01 Feb 2018 12:44  Phos  9.7     02-01  Mg     2.4     02-01    TPro  6.3  /  Alb  2.6<L>  /  TBili  0.8  /  DBili  x   /  AST  <5<L>  /  ALT  2916<H>  /  AlkPhos  76  02-01    LIVER FUNCTIONS - ( 01 Feb 2018 12:44 )  Alb: 2.6 g/dL / Pro: 6.3 g/dL / ALK PHOS: 76 U/L / ALT: 2916 U/L RC / AST: <5 U/L / GGT: x           PT/INR - ( 01 Feb 2018 12:44 )   PT: 15.6 sec;   INR: 1.43 ratio         PTT - ( 01 Feb 2018 12:44 )  PTT:32.5 sec    HbA1c 8.6    Radiology    < from: CT Head No Cont (01.31.18 @ 22:02) >  FINDINGS:  Interval demarcation of acute left PCA territory infarct   primarily involving the left occipitallobe. No CT evidence of   hemorrhagic transformation.    No hydrocephalus, midline shift, or acute intracranial hemorrhage.   Moderate white matter microvascular ischemic disease.    IMPRESSION: Interval demarcation of acute left PCA territory infarct   primarily involving the left occipital lobe. No CT evidence of   hemorrhagic transformation.    < end of copied text >  EEG Classification / Summary:  Abnormal EEG in an unresponsive patient due to:  -Asymmetry, attenuation of faster frequency activity over the left parieto-occipital region.   -Moderate to marked continuous background slowing    Clinical Impression:  Findings are consistent with moderate to marked nonspecific diffuse or multifocal cerebral encephalopathy, with evidence of cortical injury in the left posterior quadrant, maximum occipitally. There are no definitive epileptiform abnormalities recorded. Neurology Consult Note    Name  MILANA BALL    HPI:  66 y/o woman w/ hx Breast Cancer (diagnosed in Feb 2017) currently on Herceptin, HTN, DM II, HLD on 70/30 p/w fever, RVP positive for flu on 1/30. PEA arrest with chest compressions for 10min, achieved ROSC after Epi x 2, and was intubated. Patient required re-intubation (improperly intubated initially into the esophagus) after she PEA arrested again, with ROSC achieved after 30min with Epi x 5. Patient was also found to have bilateral pneumothoraces, s/p 3 chest tubes, 2 on the right side, and 1 on the left. Patient also has paracentesis drain for decompression of the abdomen. CTH performed on 1/31, showing interval development of L PCA ischemic infarct. Pt is intubated, has been on sedation precedex. Per family at bedside, pt is intermittently waking up, opening eyes, responding appropriately by nodding her head, making arm motions asking for water, and following commands.     NIHSS 22 MRS 0    Review of Systems:  Unable to obtain 2/2 mental status    MEDICATIONS  (STANDING):  ALBUTerol/ipratropium for Nebulization 3 milliLiter(s) Nebulizer every 6 hours  dexmedetomidine Infusion 0.107 MICROgram(s)/kG/Hr (2 mL/Hr) IV Continuous <Continuous>  dextrose 5%. 1000 milliLiter(s) (50 mL/Hr) IV Continuous <Continuous>  dextrose 50% Injectable 12.5 Gram(s) IV Push once  dextrose 50% Injectable 25 Gram(s) IV Push once  dextrose 50% Injectable 25 Gram(s) IV Push once  heparin  Injectable 5000 Unit(s) SubCutaneous every 8 hours  insulin lispro (HumaLOG) corrective regimen sliding scale   SubCutaneous three times a day before meals  norepinephrine Infusion 0.01 MICROgram(s)/kG/Min (1.313 mL/Hr) IV Continuous <Continuous>  oseltamivir Suspension 30 milliGRAM(s) Oral every 12 hours  pantoprazole  Injectable 40 milliGRAM(s) IV Push every 12 hours    MEDICATIONS  (PRN):  dextrose Gel 1 Dose(s) Oral once PRN Blood Glucose LESS THAN 70 milliGRAM(s)/deciliter  glucagon  Injectable 1 milliGRAM(s) IntraMuscular once PRN Glucose LESS THAN 70 milligrams/deciliter  midazolam Injectable 2 milliGRAM(s) IV Push every 2 hours PRN Agitation    Allergies    NIFEdipine (Urticaria; Hives)  vitamin E (Short breath; Urticaria; Hives)    PAST MEDICAL & SURGICAL HISTORY:  Diabetes  Breast CA  H/O mastectomy, bilateral  No significant past surgical history    FH: NC    SH: Patient lives at home with her . No smoking, alcohol or illicit drug use    Objective:   Vital Signs Last 24 Hrs  T(C): 36.9 (01 Feb 2018 07:00), Max: 37.9 (31 Jan 2018 16:00)  T(F): 98.4 (01 Feb 2018 07:00), Max: 100.2 (31 Jan 2018 16:00)  HR: 83 (01 Feb 2018 14:45) (66 - 94)  BP: 129/60 (01 Feb 2018 14:45) (90/41 - 141/63)  BP(mean): 87 (01 Feb 2018 14:45) (59 - 101)  RR: 24 (01 Feb 2018 14:45) (2 - 31)  SpO2: 99% (01 Feb 2018 14:45) (94% - 100%)    General Exam:   General appearance: Intubated      Neurological Exam: Sedation (precedex) held for neurologic exam    Mental Status: Eyes closed, but opens to voice and tactile stimulus. Not tracking. Follows simple appendicular and midline commands.     Cranial Nerves: Pupil 2 mm with sluggish reaction b/l, eyes midline, no BTT b/l, face grossly symmetric    Motor:   Tone: Decreased throughout  Strength: RUE wrist flexion 2/5, 1/5 biceps, 0/5 elsewhere, 0/5 LUE. LLE PF/DF 2/5, 0/5 in RLE  Tremor: No resting, postural or action tremor.  No myoclonus.    Sensation: Mild grimace to noxious stimulus in b/l LEs    Deep Tendon Reflexes: Absent throughout  Toes mute b/l    Coord: Unable to assess 2/2 weakness and mental status      Other:                        9.3    11.4  )-----------( 164      ( 01 Feb 2018 12:44 )             27.8     02-01    133<L>  |  95<L>  |  61<H>  ----------------------------<  229<H>  5.2   |  18<L>  |  4.04<H>    Ca    7.4<L>      01 Feb 2018 12:44  Phos  9.7     02-01  Mg     2.4     02-01    TPro  6.3  /  Alb  2.6<L>  /  TBili  0.8  /  DBili  x   /  AST  <5<L>  /  ALT  2916<H>  /  AlkPhos  76  02-01    LIVER FUNCTIONS - ( 01 Feb 2018 12:44 )  Alb: 2.6 g/dL / Pro: 6.3 g/dL / ALK PHOS: 76 U/L / ALT: 2916 U/L RC / AST: <5 U/L / GGT: x           PT/INR - ( 01 Feb 2018 12:44 )   PT: 15.6 sec;   INR: 1.43 ratio         PTT - ( 01 Feb 2018 12:44 )  PTT:32.5 sec    HbA1c 8.6    Radiology    < from: CT Head No Cont (01.31.18 @ 22:02) >  FINDINGS:  Interval demarcation of acute left PCA territory infarct   primarily involving the left occipitallobe. No CT evidence of   hemorrhagic transformation.    No hydrocephalus, midline shift, or acute intracranial hemorrhage.   Moderate white matter microvascular ischemic disease.    IMPRESSION: Interval demarcation of acute left PCA territory infarct   primarily involving the left occipital lobe. No CT evidence of   hemorrhagic transformation.    < end of copied text >  EEG Classification / Summary:  Abnormal EEG in an unresponsive patient due to:  -Asymmetry, attenuation of faster frequency activity over the left parieto-occipital region.   -Moderate to marked continuous background slowing    Clinical Impression:  Findings are consistent with moderate to marked nonspecific diffuse or multifocal cerebral encephalopathy, with evidence of cortical injury in the left posterior quadrant, maximum occipitally. There are no definitive epileptiform abnormalities recorded.

## 2018-02-01 NOTE — CONSULT NOTE ADULT - ASSESSMENT
Impression:  1. Pneumothorax/pneumoperitoneum - likely secondary to traumatic intubation/ventilation of the GI tract - on imaging, no antonina evidence of a perforated hollow viscus  2. s/p cardiac arrest with ?cardiogenic shock  3. Influenza related pneumonia    Recommend:  -Given the patient's pneumothorax and pneumoperitoneum, would recommend conservative management only  -Would maintain NGT to suction and continue board spectrum antibiotics  -Unclear role of drain into the peritoneal space  -Serial imaging for resolution of patient's pneumothorax/pneumoperitoneum  -Would allow time and resolution of air before giving anything including contrast into the GI tract Impression:  1. Pneumothorax/pneumoperitoneum - likely secondary to traumatic intubation/ventilation of the GI tract - on imaging, no antonina evidence of a perforated hollow viscus  2. s/p cardiac arrest with ?cardiogenic shock  3. Influenza related pneumonia    Recommend:  -Given the patient's pneumothorax and pneumoperitoneum, would recommend conservative management only  -Would maintain NGT to suction and continue board spectrum antibiotics  -Unclear role of drain into the peritoneal space  -Serial imaging for resolution of patient's pneumothorax/pneumoperitoneum  -can consider esophogram - would discuss with cts regarding the safest type of contrast if esophogram  is desired Impression:  1. Pneumothorax/pneumoperitoneum - likely secondary to traumatic intubation/ventilation of the GI tract - on imaging, no antonina evidence of a perforated hollow viscus  2. s/p cardiac arrest with ?cardiogenic shock  3. Influenza related pneumonia  4. Elevated transaminases - likely due to shock given cardiac arrest and high LDH    Recommend:  -Given the patient's pneumothorax and pneumoperitoneum, would recommend conservative management only  -Would maintain NGT to suction and continue board spectrum antibiotics  -Unclear role of drain into the peritoneal space  -Serial imaging for resolution of patient's pneumothorax/pneumoperitoneum  -can consider esophogram - would discuss with cts regarding the safest type of contrast if esophogram  is desired

## 2018-02-01 NOTE — PROGRESS NOTE ADULT - SUBJECTIVE AND OBJECTIVE BOX
[facility] DIVISION OF KIDNEY DISEASES AND HYPERTENSION -- FOLLOW UP NOTE  --------------------------------------------------------------------------------  Chief Complaint: cardiac arrest    24 hour events/subjective:  continues to be anuric. Scr and CK rising.        PAST HISTORY  --------------------------------------------------------------------------------  No significant changes to PMH, PSH, FHx, SHx, unless otherwise noted    ALLERGIES & MEDICATIONS  --------------------------------------------------------------------------------  Allergies    NIFEdipine (Urticaria; Hives)  vitamin E (Short breath; Urticaria; Hives)    Intolerances      Standing Inpatient Medications  ALBUTerol/ipratropium for Nebulization 3 milliLiter(s) Nebulizer every 6 hours  dexmedetomidine Infusion 0.107 MICROgram(s)/kG/Hr IV Continuous <Continuous>  dextrose 5%. 1000 milliLiter(s) IV Continuous <Continuous>  dextrose 50% Injectable 12.5 Gram(s) IV Push once  dextrose 50% Injectable 25 Gram(s) IV Push once  dextrose 50% Injectable 25 Gram(s) IV Push once  heparin  Injectable 5000 Unit(s) SubCutaneous every 8 hours  insulin lispro (HumaLOG) corrective regimen sliding scale   SubCutaneous three times a day before meals  norepinephrine Infusion 0.01 MICROgram(s)/kG/Min IV Continuous <Continuous>  oseltamivir Suspension 30 milliGRAM(s) Oral every 12 hours  pantoprazole  Injectable 40 milliGRAM(s) IV Push every 12 hours    PRN Inpatient Medications  dextrose Gel 1 Dose(s) Oral once PRN  glucagon  Injectable 1 milliGRAM(s) IntraMuscular once PRN  midazolam Injectable 2 milliGRAM(s) IV Push every 2 hours PRN      REVIEW OF SYSTEMS  --------------------------------------------------------------------------------  unable to obtain    VITALS/PHYSICAL EXAM  --------------------------------------------------------------------------------  T(C): 36.7 (02-01-18 @ 15:00), Max: 37.8 (02-01-18 @ 00:00)  HR: 75 (02-01-18 @ 16:25) (66 - 94)  BP: 102/44 (02-01-18 @ 16:00) (90/41 - 147/58)  RR: 24 (02-01-18 @ 15:45) (2 - 31)  SpO2: 99% (02-01-18 @ 16:25) (94% - 100%)  Wt(kg): --  Height (cm): 160.02 (01-30-18 @ 18:15)  Weight (kg): 74.8 (01-30-18 @ 18:15)  BMI (kg/m2): 29.2 (01-30-18 @ 18:15)  BSA (m2): 1.78 (01-30-18 @ 18:15)      01-31-18 @ 07:01  -  02-01-18 @ 07:00  --------------------------------------------------------  IN: 1392 mL / OUT: 62 mL / NET: 1330 mL    02-01-18 @ 07:01  -  02-01-18 @ 16:41  --------------------------------------------------------  IN: 268.4 mL / OUT: 3 mL / NET: 265.4 mL      Physical Exam:  	Gen: intubated  	Pulm: vent sounds+  	CV: RRR, S1S2  	Abd: +BS, soft, nontender/nondistended  	UE: Warm, ; no edema;   	LE: Warm,  no edema  	Skin: Warm,       LABS/STUDIES  --------------------------------------------------------------------------------              9.3    11.4  >-----------<  164      [02-01-18 @ 12:44]              27.8     133  |  95  |  61  ----------------------------<  229      [02-01-18 @ 12:44]  5.2   |  18  |  4.04        Ca     7.4     [02-01-18 @ 12:44]      Mg     2.4     [02-01-18 @ 12:44]      Phos  9.7     [02-01-18 @ 12:44]    TPro  6.3  /  Alb  2.6  /  TBili  0.8  /  DBili  x   /  AST  <5  /  ALT  2916  /  AlkPhos  76  [02-01-18 @ 12:44]    PT/INR: PT 15.6 , INR 1.43       [02-01-18 @ 12:44]  PTT: 32.5       [02-01-18 @ 12:44]    Troponin 0.08      [02-01-18 @ 12:44]  CK 1019      [02-01-18 @ 12:44]        [01-30-18 @ 23:33]    Creatinine Trend:  SCr 4.04 [02-01 @ 12:44]  SCr 3.54 [02-01 @ 04:38]  SCr 3.15 [01-31 @ 21:45]  SCr 2.71 [01-31 @ 14:35]  SCr 2.09 [01-31 @ 05:33]    Urinalysis - [01-30-18 @ 12:52]      Color Yellow / Appearance SL Turbid / SG 1.025 / pH 6.0      Gluc 50 / Ketone Negative  / Bili Negative / Urobili Negative       Blood Trace / Protein 150 / Leuk Est Negative / Nitrite Negative      RBC 3-5 / WBC  / Hyaline  / Gran  / Sq Epi  / Non Sq Epi OCC / Bacteria       HbA1c 8.6      [01-31-18 @ 07:15]

## 2018-02-01 NOTE — EEG REPORT - NS EEG TEXT BOX
[facility]  Comprehensive Epilepsy Center  Report of Continuous Video EEG    [facility]: [address] [facility] , [address], [place] [address], Ph#: [phone]  [facility]: [address] [place] [address], [place] [address], Ph#: [phone]  Office: 1 [facility], [facility] [address], [place] [address] Ph#: [phone]    Patient Name: Jessica Barraza    Age: 67 y, : 1950  Patient ID: -, MRN #: MR# [id number], [place]: [place] [address]068   Referring Physician: -  EEG #: [id number]    Study Date: 2018-2018    Study Information:    EEG Recording Technique:  The patient underwent continuous Video-EEG monitoring, using Telemetry System hardware on the XLTek Digital System. EEG and video data were stored on a computer hard drive with important events saved in digital archive files. The material was reviewed by a physician (electroencephalographer / epileptologist) on a daily basis. Juan Jose and seizure detection algorithms were utilized and reviewed. An EEG Technician attended to the patient, and was available throughout daytime work hours.  The epilepsy center neurologist was available in person or on call 24-hours per day.    EEG Placement and Labeling of Electrodes:  The EEG was performed utilizing 20 channel referential EEG connections (coronal over temporal over parasagittal montage) using all standard 10-20 electrode placements with EKG, with additional electrodes placed in the inferior temporal region using the modified 10-10 montage electrode placements for elective admissions, or if deemed necessary. Recording was at a sampling rate of 256 samples per second per channel. Time synchronized digital video recording was done simultaneously with EEG recording. A low light infrared camera was used for low light recording.     History:  -Concern for Seizures     Medication	  No Data.	    Interpretation:    -  Startin2018    Daily EEG Visual Analysis  FINDINGS:  The background was continuous, variable and reactive. It consisted of continuous diffuse theta and polymorphic delta frequencies. There was no discernible posterior dominant rhythm or anterior posterior gradient.     Background Slowing:  Generalized slowing: continuous diffuse theta and polymorphic delta activity   Focal slowing: none was present.    Sleep Background:  Stage II sleep transients were not recorded.    Other Paroxysmal Activity:  Rare sharp transients in the right fronto-central region (Max C4)   Occasional very brief fluctuating runs of 1.5-2Hz frontally predominant generalized rhythmic delta (Frontally predominant GRDA)    Interictal Epileptiform Activity:   None     Ictal Epileptiform Activity or Events:  No clinical events were recorded.  No seizures were recorded.    Artifacts:  Intermittent myogenic and movement artifacts were noted.    ECG:  The heart rate on single channel ECG was predominantly between 70-80 BPM.    EEG Classification / Summary:  Abnormal EEG in an unresponsive patient due to:  -Occasional frontally predominant generalized rhythmic delta activity  -Moderate to marked continuous background slowing    Clinical Impression:  Frontally predominant generalized rhythmic delta activity may be a sign of nonspecific diffuse encephalopathy and may also be seen in the setting of increased intracranial pressure or a midline cerebral lesion. Findings are also consistent with moderate to marked nonspecific diffuse or multifocal cerebral encephalopathy. There are no clear epileptiform abnormalities recorded.      Preliminary Report     Bernie Hampton DO   Neurophysiology/Epilepsy Fellow, [facility] Epilepsy [facility]  Comprehensive Epilepsy Center  Report of Continuous Video EEG    [facility]: [address] [facility] , [address], [place] [address], Ph#: [phone]  [facility]: [address] [place] [address], [place] [address], Ph#: [phone]  Office: 1 [facility], [facility] [address], [place] [address] Ph#: [phone]    Patient Name: Jessica Barraza    Age: 67 y, : 1950  Patient ID: -, MRN #: MR# [id number], [place]: [place] [id number]   Referring Physician: -  EEG #: [id number]    Study Date: 2018-2018    Study Information:    EEG Recording Technique:  The patient underwent continuous Video-EEG monitoring, using Telemetry System hardware on the XLTek Digital System. EEG and video data were stored on a computer hard drive with important events saved in digital archive files. The material was reviewed by a physician (electroencephalographer / epileptologist) on a daily basis. Juan Jose and seizure detection algorithms were utilized and reviewed. An EEG Technician attended to the patient, and was available throughout daytime work hours.  The epilepsy center neurologist was available in person or on call 24-hours per day.    EEG Placement and Labeling of Electrodes:  The EEG was performed utilizing 20 channel referential EEG connections (coronal over temporal over parasagittal montage) using all standard 10-20 electrode placements with EKG, with additional electrodes placed in the inferior temporal region using the modified 10-10 montage electrode placements for elective admissions, or if deemed necessary. Recording was at a sampling rate of 256 samples per second per channel. Time synchronized digital video recording was done simultaneously with EEG recording. A low light infrared camera was used for low light recording.     History:  -Concern for Seizures     Medication	  No Data.	    Interpretation:    -  Startin2018    Daily EEG Visual Analysis  FINDINGS:  The background was continuous, variable and reactive. It consisted of continuous diffuse theta and polymorphic delta frequencies. There was no discernible posterior dominant rhythm or anterior posterior gradient.     Background Slowing:  Generalized slowing: continuous diffuse theta and polymorphic delta activity   Focal slowing: none was present.    Sleep Background:  Stage II sleep transients were not recorded.    Other Paroxysmal Activity:  Rare sharp transients in the right fronto-central region (Max C4)   Occasional very brief fluctuating runs of 1.5-2Hz frontally predominant generalized rhythmic delta (Frontally predominant GRDA)    Interictal Epileptiform Activity:   None     Ictal Epileptiform Activity or Events:  No clinical events were recorded.  No seizures were recorded.    Artifacts:  Intermittent myogenic and movement artifacts were noted.    ECG:  The heart rate on single channel ECG was predominantly between 70-80 BPM.    EEG Classification / Summary:  Abnormal EEG in an unresponsive patient due to:  -Occasional frontally predominant generalized rhythmic delta activity  -Moderate to marked continuous background slowing    Clinical Impression:  Frontally predominant generalized rhythmic delta activity may be a sign of nonspecific diffuse encephalopathy and may also be seen in the setting of increased intracranial pressure or a midline cerebral lesion. Findings are also consistent with moderate to marked nonspecific diffuse or multifocal cerebral encephalopathy. There are no clear epileptiform abnormalities recorded.      Preliminary Report     Bernie Hampton DO   Neurophysiology/Epilepsy Fellow, [facility] Epilepsy [facility]  Comprehensive Epilepsy Center  Report of Continuous Video EEG    [facility]: [address] [facility] , [address], [place] [address], Ph#: [phone]  [facility]: [address] [place] [address], Ph#: [phone]  Office: 1 [facility], [facility] [address], [place] [address] Ph#: [phone]    Patient Name: Jessica Barraza    Age: 67 y, : 1950  Patient ID: -, MRN #: MR# [id number], [place]: [address]  Referring Physician: -  EEG #: [id number]    Study Date: 2018-2018    Study Information:    EEG Recording Technique:  The patient underwent continuous Video-EEG monitoring, using Telemetry System hardware on the XLTek Digital System. EEG and video data were stored on a computer hard drive with important events saved in digital archive files. The material was reviewed by a physician (electroencephalographer / epileptologist) on a daily basis. Juan Jose and seizure detection algorithms were utilized and reviewed. An EEG Technician attended to the patient, and was available throughout daytime work hours.  The epilepsy center neurologist was available in person or on call 24-hours per day.    EEG Placement and Labeling of Electrodes:  The EEG was performed utilizing 20 channel referential EEG connections (coronal over temporal over parasagittal montage) using all standard 10-20 electrode placements with EKG, with additional electrodes placed in the inferior temporal region using the modified 10-10 montage electrode placements for elective admissions, or if deemed necessary. Recording was at a sampling rate of 256 samples per second per channel. Time synchronized digital video recording was done simultaneously with EEG recording. A low light infrared camera was used for low light recording.     History:  - H/O L PCA Infarct   - Concern for Seizures     Medication	  MEDICATIONS  (STANDING): ALBUTerol/ipratropium for Nebulization 3 milliLiter(s) Nebulizer every 6 hours dexmedetomidine Infusion 0.107 MICROgram(s)/kG/Hr (2 mL/Hr) IV Continuous <Continuous> heparin  Injectable 5000 Unit(s) SubCutaneous every 8 hours insulin regular  human corrective regimen sliding scale   SubCutaneous every 6 hours norepinephrine Infusion 0.01 MICROgram(s)/kG/Min (1.313 mL/Hr) IV Continuous <Continuous> oseltamivir Suspension 30 milliGRAM(s) Oral every 12 hours pantoprazole  Injectable 40 milliGRAM(s) IV Push every 12 hours 	    Interpretation:    18-  Startin2018    Daily EEG Visual Analysis  FINDINGS:  The background was continuous, variable and reactive. It consisted of continuous diffuse theta and polymorphic delta frequencies. There was no discernible posterior dominant rhythm or anterior posterior gradient.     Background Slowing:  Generalized slowing: continuous diffuse theta and polymorphic delta activity   Focal slowing: none was present.    Sleep Background:  Stage II sleep transients were not recorded.    Other Abnormal Activity:  Asymmetry, attenuation of faster frequency activity over the left parieto-occipital region (Max O1)   Rare sharp transients in the right fronto-central region (Max C4)       Interictal Epileptiform Activity:   None     Ictal Epileptiform Activity or Events:  No clinical events were recorded.  No seizures were recorded.    Artifacts:  Intermittent myogenic and movement artifacts were noted.    ECG:  The heart rate on single channel ECG was predominantly between 70-80 BPM.    EEG Classification / Summary:  Abnormal EEG in an unresponsive patient due to:  -Asymmetry, attenuation of faster frequency activity over the left parieto-occipital region.   -Moderate to marked continuous background slowing    Clinical Impression:  Findings are consistent with moderate to marked nonspecific diffuse or multifocal cerebral encephalopathy, with evidence of cortical injury in the left posterior quadrant, maximum occipitally. There are no definitive epileptiform abnormalities recorded.        Bernie Hampton DO   Neurophysiology/Epilepsy Fellow, [facility] Epilepsy [facility]  Comprehensive Epilepsy Center  Report of Continuous Video EEG    [facility]: [address] [facility] , [address], [place] [address], Ph#: [phone]  [facility]: [address] [place] [address], Ph#: [phone]  Office: 1 [facility], [facility] [address], [place] [address] Ph#: [phone]    Patient Name: Jessica Barraza    Age: 67 y, : 1950  Patient ID: -, MRN #: MR# [id number], [place]es: [address]  Referring Physician: -  EEG #: [id number]-    Study Date: 2018-2018    Study Information:    EEG Recording Technique:  The patient underwent continuous Video-EEG monitoring, using Telemetry System hardware on the XLTek Digital System. EEG and video data were stored on a computer hard drive with important events saved in digital archive files. The material was reviewed by a physician (electroencephalographer / epileptologist) on a daily basis. Juan Jose and seizure detection algorithms were utilized and reviewed. An EEG Technician attended to the patient, and was available throughout daytime work hours.  The epilepsy center neurologist was available in person or on call 24-hours per day.    EEG Placement and Labeling of Electrodes:  The EEG was performed utilizing 20 channel referential EEG connections (coronal over temporal over parasagittal montage) using all standard 10-20 electrode placements with EKG, with additional electrodes placed in the inferior temporal region using the modified 10-10 montage electrode placements for elective admissions, or if deemed necessary. Recording was at a sampling rate of 256 samples per second per channel. Time synchronized digital video recording was done simultaneously with EEG recording. A low light infrared camera was used for low light recording.     History:  - H/O L PCA Infarct   - Concern for Seizures     Medication	  MEDICATIONS  (STANDING): ALBUTerol/ipratropium for Nebulization 3 milliLiter(s) Nebulizer every 6 hours dexmedetomidine Infusion 0.107 MICROgram(s)/kG/Hr (2 mL/Hr) IV Continuous <Continuous> heparin  Injectable 5000 Unit(s) SubCutaneous every 8 hours insulin regular  human corrective regimen sliding scale   SubCutaneous every 6 hours norepinephrine Infusion 0.01 MICROgram(s)/kG/Min (1.313 mL/Hr) IV Continuous <Continuous> oseltamivir Suspension 30 milliGRAM(s) Oral every 12 hours pantoprazole  Injectable 40 milliGRAM(s) IV Push every 12 hours 	    Interpretation:    18-  Startin2018    Daily EEG Visual Analysis  FINDINGS:  The background was continuous, variable and reactive. It consisted of continuous diffuse theta and polymorphic delta frequencies. There was no discernible posterior dominant rhythm or anterior posterior gradient.     Background Slowing:  Generalized slowing: continuous diffuse theta and polymorphic delta activity   Focal slowing: none was present.    Sleep Background:  Stage II sleep transients were not recorded.    Other Abnormal Activity:  Asymmetry, attenuation of faster frequency activity over the left parieto-occipital region (Max O1)   Rare sharp transients in the right fronto-central region (Max C4)       Interictal Epileptiform Activity:   None     Ictal Epileptiform Activity or Events:  No clinical events were recorded.  No seizures were recorded.    Artifacts:  Intermittent myogenic and movement artifacts were noted.    ECG:  The heart rate on single channel ECG was predominantly between 70-80 BPM.    EEG Classification / Summary:  Abnormal EEG in an unresponsive patient due to:  -Asymmetry, attenuation of faster frequency activity over the left parieto-occipital region.   -Moderate to marked continuous background slowing    Clinical Impression:  Findings are consistent with moderate to marked nonspecific diffuse or multifocal cerebral encephalopathy, with evidence of cortical injury in the left posterior quadrant, maximum occipitally. There are no definitive epileptiform abnormalities recorded.        Bernie Hampton DO   Neurophysiology/Epilepsy Fellow, [facility] Epilepsy [place]    Zechariah Robb MD  Attending Physician, [facility] Epilepsy [place]

## 2018-02-01 NOTE — PROGRESS NOTE ADULT - ASSESSMENT
66 yo F with Breast cancer, HTN presented with the flu, went into respiratory arrest, then PEA arrest, s/p chest tube, TPA and intubation.   Patient now with shock liver, RUSS pneumoperitoneum, elevated cardiac enzymes

## 2018-02-01 NOTE — PROGRESS NOTE ADULT - PROBLEM SELECTOR PLAN 1
in setting of cardiac arrest. pt continues to be oligo anuric. CK levels trending up. Plan to initiate IHD after dialysis catheter placement. Consent in chart.  Avoid NSAIDS, RCA and nephrotoxins. Monitor BMP.

## 2018-02-01 NOTE — CONSULT NOTE ADULT - ASSESSMENT
68 y/o woman w/ hx Breast Cancer (diagnosed in Feb 2017) currently on Herceptin, HTN, DM II on 70/30 p/w fever, RVP positive for flu on 1/30. PEA arrest with chest compressions for 10min, received ROSC after Epi x 2, intubated, requiring re-intubation s/p recurrent PEA w/ ROSC after 30 mins. On neurologic exam, patient does wake up and follow some simple commands with sedation held, responds appropriately to questions with head nod. CTH shows L PCA infarct, possibly subacute in appearance. EEG shows no epileptiform abnormalities with moderate to marked background slowing.     Recommend:  - Check lipid profile  - Start ASA 81 mg daily, lipitor 80 mg qhs unless otherwise contraindicated  - MRI brain w/o contrast when able  - MRA head/neck w/o contrast vs carotid dopplers  - TTE w/ bubble study  - Continue DVT ppx 68 y/o woman w/ hx Breast Cancer (diagnosed in Feb 2017) currently on Herceptin, HTN, DM II on 70/30 p/w fever, RVP positive for flu on 1/30. PEA arrest with chest compressions for 10min, received ROSC after Epi x 2, intubated, requiring re-intubation s/p recurrent PEA w/ ROSC after 30 mins. On neurologic exam, patient does wake up and follow some simple commands with sedation held, responds appropriately to questions with head nod. CTH shows L PCA infarct, possibly subacute in appearance. EEG shows no epileptiform abnormalities with moderate to marked background slowing.     Recommend:  - Check lipid profile  - Start ASA 81 mg daily, lipitor 40 mg qhs unless otherwise contraindicated  - MRI brain w/o contrast when able  - MRA head/neck w/o contrast vs carotid dopplers  - TTE w/ bubble study  - Continue DVT ppx

## 2018-02-01 NOTE — CHART NOTE - NSCHARTNOTEFT_GEN_A_CORE
Patient s/p B/L Pigtails  removed  originally placed in ED,  no Ptx ,    spoke to MICU attending.  Will sign off for now  please call thoracic surgery any further issues.

## 2018-02-01 NOTE — PROGRESS NOTE ADULT - ASSESSMENT
67F w PMH of bilateral Breast CA (on Trastuzumab, last dose Jan 20th) admitted to MICU with influenza PNA c/b PEA arrest x 2 with ROSC x 2 (10 min + 30 min). CPR c/b bilateral PTX (s/p bilateral chest tubes), pneumoperitoneum (s/p Peritoneal drain (pigtail) in right hemiabdomen and paracentesis decompression) a/w extensive subcutaneous emphysema. Pt received 100 mg of empiric TPA in ED. Surgery consulted and not recommending surgical intervention.       Neurologic:  Alteration of mental status present. Likely due possible structural anoxic brain injury due to prolonged PEA arrest x2 + CTH after TPA showing evidence of new small acute left PCA territory infarct without hemorrhagic conversion. Pt on minimal sedation today. All 3 chest tubes have been removed. Repeat CTH after 24 hours showing no acute changes. Pt now following commands and moving all extremities.     Skin:  Lines: Right IJ  Decubiti: None    GI:  Diet: pt NPO  Pt with pneumoperitoneum potentially secondary to pneumothorax communication. Imaging showing no evidence of viscous perforation.   Surgery recalled about CT abdomen showing evidence of dilated loops of small bowel.     Diet: NPO  GI stress prophylaxis indicated pantoprazole 40IV daily    Metabolic:  BMP showing evidence of RUSS 2/2 ATN 2/2 PEA arrest  Liver functions tests show ischemic hepatopathy with transaminitis 2/2 hypoxic insult 2/2 PEA arrest. LFTs trending down.     02-01    131<L>  |  96  |  54<H>  ----------------------------<  197<H>  4.4   |  17<L>  |  3.54<H>    Ca    7.3<L>      01 Feb 2018 04:38  Phos  8.1     02-01  Mg     2.3     02-01    PT/INR - ( 01 Feb 2018 04:38 )   PT: 19.5 sec;   INR: 1.78 ratio         PTT - ( 01 Feb 2018 04:38 )  PTT:35.0 sec    LIVER FUNCTIONS - ( 01 Feb 2018 04:38 )  Alb: 2.3 g/dL / Pro: 5.7 g/dL / ALK PHOS: 67 U/L / ALT: 1860 U/L RC / AST: 142 U/L / GGT: x           Volume Assessment:  No peripheral edema  No EVLW on US  No evidence of disturbance of effective circulating volume on US    Hematologic:  DVT prophylaxis with HSQ                        9.2    23.5  )-----------( 258      ( 31 Jan 2018 05:33 )             26.9     PT/INR - ( 31 Jan 2018 05:33 )   PT: 19.9 sec;   INR: 1.80 ratio         PTT - ( 31 Jan 2018 05:33 )  PTT:34.2 sec    Infectious Disease:  c/w oseltamivir day 2/5  c/w pip tazo 2/7    Hemodynamics:  Patient is on pressors due to obstructive vs vasoplegic shock 2/2 to sedation effect    Cardiovascular:  GDE unable to perform due subcut emphysema and anterior chest wall bandages.  will order official TTE and follow up with STEPHANIE if unable to assess for obstructive shock      Respiratory:  Pt intubated  Lung US showing focal B lines on 1/31  Chest tubes removed. Peritoneal drain removed.

## 2018-02-01 NOTE — PROGRESS NOTE ADULT - SUBJECTIVE AND OBJECTIVE BOX
CHIEF COMPLAINT: influenza PNA c/b PEA arrest x 2. CPR c/b bilateral PTX (s/p bilateral chest tubes), pneumoperitoneum    Interval Events: chest tubes removed, now following commands and moving all 4 extremities.    REVIEW OF SYSTEMS:  Constitutional: [ ] negative [ ] fevers [ ] chills [ ] weight loss [ ] weight gain  HEENT: [ ] negative [ ] dry eyes [ ] eye irritation [ ] postnasal drip [ ] nasal congestion  CV: [ ] negative  [ ] chest pain [ ] orthopnea [ ] palpitations [ ] murmur  Resp: [ ] negative [ ] cough [ ] shortness of breath [ ] dyspnea [ ] wheezing [ ] sputum [ ] hemoptysis  GI: [ ] negative [ ] nausea [ ] vomiting [ ] diarrhea [ ] constipation [ ] abd pain [ ] dysphagia   : [ ] negative [ ] dysuria [ ] nocturia [ ] hematuria [ ] increased urinary frequency  Musculoskeletal: [ ] negative [ ] back pain [ ] myalgias [ ] arthralgias [ ] fracture  Skin: [ ] negative [ ] rash [ ] itch  Neurological: [ ] negative [ ] headache [ ] dizziness [ ] syncope [ ] weakness [ ] numbness  Psychiatric: [ ] negative [ ] anxiety [ ] depression  Endocrine: [ ] negative [ ] diabetes [ ] thyroid problem  Hematologic/Lymphatic: [ ] negative [ ] anemia [ ] bleeding problem  Allergic/Immunologic: [ ] negative [ ] itchy eyes [ ] nasal discharge [ ] hives [ ] angioedema  [ ] All other systems negative  [x ] Unable to assess ROS because pt intubated and sedated    OBJECTIVE:  ICU Vital Signs Last 24 Hrs  T(C): 36.9 (2018 04:00), Max: 37.9 (2018 12:00)  T(F): 98.5 (2018 04:00), Max: 100.3 (2018 12:00)  HR: 86 (2018 07:15) (66 - 105)  BP: 95/50 (2018 07:00) (90/41 - 144/62)  BP(mean): 64 (2018 07:00) (59 - 92)  ABP: --  ABP(mean): --  RR: 22 (2018 21:30) (0 - 31)  SpO2: 95% (2018 07:15) (93% - 100%)    Mode: AC/ CMV (Assist Control/ Continuous Mandatory Ventilation), RR (machine): 22, TV (machine): 400, FiO2: 40, PEEP: 5, ITime: 1, MAP: 13, PIP: 31     @ 07:01  -  02- @ 07:00  --------------------------------------------------------  IN: 1392 mL / OUT: 62 mL / NET: 1330 mL      CAPILLARY BLOOD GLUCOSE      POCT Blood Glucose.: 211 mg/dL (2018 01:03)      PHYSICAL EXAM:  General: NAD  Respiratory: intubated. Bilateral chest bandages over previous chest tubes.   Cardiovascular: RRR no MRG  Abdominal: Soft, distended.   Extremities: Warm  Skin: right IJ    LINES:    HOSPITAL MEDICATIONS:  Standing Meds:  ALBUTerol/ipratropium for Nebulization 3 milliLiter(s) Nebulizer every 6 hours  dexmedetomidine Infusion 0.107 MICROgram(s)/kG/Hr IV Continuous <Continuous>  heparin  Injectable 5000 Unit(s) SubCutaneous every 8 hours  insulin regular  human corrective regimen sliding scale   SubCutaneous every 6 hours  norepinephrine Infusion 0.01 MICROgram(s)/kG/Min IV Continuous <Continuous>  oseltamivir Suspension 30 milliGRAM(s) Oral every 12 hours  pantoprazole  Injectable 40 milliGRAM(s) IV Push every 12 hours  piperacillin/tazobactam IVPB. 3.375 Gram(s) IV Intermittent every 12 hours      PRN Meds:  LORazepam   Injectable 4 milliGRAM(s) IV Push once PRN  midazolam Injectable 2 milliGRAM(s) IV Push every 2 hours PRN      LABS:                        8.6    7.3   )-----------( 129      ( 2018 04:38 )             25.1     Hgb Trend: 8.6<--, 8.6<--, 8.3<--, 9.2<--, 10.6<--      131<L>  |  96  |  54<H>  ----------------------------<  197<H>  4.4   |  17<L>  |  3.54<H>    Ca    7.3<L>      2018 04:38  Phos  8.1     -  Mg     2.3     -    TPro  5.7<L>  /  Alb  2.3<L>  /  TBili  0.7  /  DBili  x   /  AST  142<H>  /  ALT  1860<H>  /  AlkPhos  67  -    Creatinine Trend: 3.54<--, 3.15<--, 2.71<--, 2.09<--, 1.67<--, 1.44<--  PT/INR - ( 2018 04:38 )   PT: 19.5 sec;   INR: 1.78 ratio         PTT - ( 2018 04:38 )  PTT:35.0 sec  Urinalysis Basic - ( 2018 12:52 )    Color: Yellow / Appearance: SL Turbid / S.025 / pH: x  Gluc: x / Ketone: Negative  / Bili: Negative / Urobili: Negative   Blood: x / Protein: 150 mg/dL / Nitrite: Negative   Leuk Esterase: Negative / RBC: 3-5 /HPF / WBC x   Sq Epi: x / Non Sq Epi: OCC /HPF / Bacteria: x      Arterial Blood Gas:   @ 06:44  7.24/43/132/18/99/-8.4  ABG lactate: --  Arterial Blood Gas:   @ 19:05  7.23/48/115/20/98/-7.6  ABG lactate: --    Venous Blood Gas:   @ 04:27  7.26/44/47/19/70  VBG Lactate: 1.5  Venous Blood Gas:   @ 16:02  7.28/47/43/21/74  VBG Lactate: 1.3  Venous Blood Gas:   @ 05:30  7.17/63/36/22/62  VBG Lactate: 2.3  Venous Blood Gas:   @ 03:01  7.13/71/36/23/56  VBG Lactate: 2.7  Venous Blood Gas:   @ 23:26  7.16/66/35/23/53  VBG Lactate: 2.5  Venous Blood Gas:   @ 12:06  7.36/50/46/28/78  VBG Lactate: 2.2      MICROBIOLOGY:     Culture - Urine (collected 2018 16:16)  Source: .Urine Clean Catch (Midstream)  Final Report (2018 22:07):    <10,000 CFU/ml Normal Urogenital richa present    Culture - Blood (collected 2018 13:41)  Source: .Blood Blood-Venous  Preliminary Report (2018 14:01):    No growth to date.    Culture - Blood (collected 2018 13:41)  Source: .Blood Blood-Peripheral  Preliminary Report (2018 14:01):    No growth to date.

## 2018-02-01 NOTE — PROGRESS NOTE ADULT - ATTENDING COMMENTS
67F w PMH of bilateral Breast CA (on Trastuzumab, last dose Jan 20th) admitted with malaise flu A + and then in ER  had sudden onset dyspnea with tachycardia and hypoxemia and PEA arrest - received TPA for presumed PE - intubation initially esophageal and then with subsequent tracheal intubation with normal oxygenation x 10 minutes then with recurrent PEA with subcutaneous emphysema, empiric left pigtail cath placed and then with subsequent rgt pneumothorax and distended abdomen. Also noted to have small L PCA nonhemorrhagic stroke and now with anuria and likely ATN. Currently with improved P:F ratio with low plateau pressures, responds to voice. Chest CT demonstrates pigtail catheters (2 on the rgt and 1 on the left) are intraparenchymal. Evidence of lung sliding on bedside US indicating no further ptx. Shock likely cardiogenic with poor views due to subcutaneous emphysema on norepi moderate dose.   Overnight stable with no pneumothorax - now with diminished neurolgic response and elevations in CK likely viral myositis. Cont RUSS anuric likely ATN.  EEG without seizures.     - > precidex as needed  - > dc abx - w negative cxs  - > neuro c/s re further workup for CVA and heparin contraindx  - > Start HD  - > If evidence of RV strain on cardiac MRI -> heparin gtt  - > dc peritoneal drain     Family aware (daughter and sister as well as family friend who is an MD)    cc time: 90 minutes 67F w PMH of bilateral Breast CA (on Trastuzumab, last dose Jan 20th) admitted with malaise flu A + and then in ER  had sudden onset dyspnea with tachycardia and hypoxemia and PEA arrest - received TPA for presumed PE - intubation initially esophageal and then with subsequent tracheal intubation with normal oxygenation x 10 minutes then with recurrent PEA with subcutaneous emphysema, empiric left pigtail cath placed and then with subsequent rgt pneumothorax and distended abdomen. Also noted to have small L PCA nonhemorrhagic stroke and now with anuria and likely ATN. Currently with improved P:F ratio with low plateau pressures, responds to voice. Chest CT demonstrates pigtail catheters (2 on the rgt and 1 on the left) are intraparenchymal. Evidence of lung sliding on bedside US indicating no further ptx. Shock likely cardiogenic with poor views due to subcutaneous emphysema on norepi moderate dose.   Overnight stable with no pneumothorax - now with diminished neurologic response and elevations in CK likely viral myositis. Cont RUSS anuric likely ATN.  EEG without seizures.     - > precidex as needed  - > dc abx - w negative cxs  - > neuro c/s re further workup for CVA and heparin contraindx  - > Start HD  - > If evidence of RV strain on cardiac MRI -> heparin gtt  - > dc peritoneal drain     Family aware (daughter and sister as well as family friend who is an MD)    cc time: 90 minutes

## 2018-02-01 NOTE — CONSULT NOTE ADULT - SUBJECTIVE AND OBJECTIVE BOX
Chief Complaint:  Patient is a 67y old  Female who presents with a chief complaint of Fever, total body weakness (2018 17:58)      HPI: 67 years old female with breast Cancer (diagnosed in 2017) currently on Herceptin (last dose ), HTN, Diabetes on , presenting with Fever at home, tmax of 102 yesterday, with whole body weakness. In the ER, patient was found to have T max of 100.6, with initial vitals of 99.5, HR 82, /78, RR 18, satting 93% on room air. She was given ceftriaxone x1, and given 2L normal saline bolus with Duoneb x 3. Patient was also found to be RVP positive for flu. Patient received TPA in the ER for presumed PE. Patient then went into PEA arrest with chest compressions for 10min, received Rosc after Epi x 2, and was intubated. Patient required re-intubation (improperly intubated initially into the esophagus for 10 minutes) after she PEA arrested again, with ROSC achieved after 30min with Epi x 5. Patient was also found to have bilateral pneumothoraces, s/p 3 chest tubes, 2 on the right side, and 1 on the left. Patient also has paracentesis drain for decompression of the abdomen. Also noted to have small L PCA nonhemorrhagic stroke and now with anuria and likely ATN. Drains where also placed into the abdomen in the ER. GI is consulted for management of the pneumoperitoneum seen on CT imaging.    Allergies:  NIFEdipine (Urticaria; Hives)  vitamin E (Short breath; Urticaria; Hives)      Home Medications:    Hospital Medications:  ALBUTerol/ipratropium for Nebulization 3 milliLiter(s) Nebulizer every 6 hours  dexmedetomidine Infusion 0.107 MICROgram(s)/kG/Hr IV Continuous <Continuous>  dextrose 5%. 1000 milliLiter(s) IV Continuous <Continuous>  dextrose 50% Injectable 12.5 Gram(s) IV Push once  dextrose 50% Injectable 25 Gram(s) IV Push once  dextrose 50% Injectable 25 Gram(s) IV Push once  dextrose Gel 1 Dose(s) Oral once PRN  glucagon  Injectable 1 milliGRAM(s) IntraMuscular once PRN  heparin  Injectable 5000 Unit(s) SubCutaneous every 8 hours  insulin lispro (HumaLOG) corrective regimen sliding scale   SubCutaneous three times a day before meals  midazolam Injectable 2 milliGRAM(s) IV Push every 2 hours PRN  norepinephrine Infusion 0.01 MICROgram(s)/kG/Min IV Continuous <Continuous>  oseltamivir Suspension 30 milliGRAM(s) Oral every 12 hours  pantoprazole  Injectable 40 milliGRAM(s) IV Push every 12 hours      PMHX/PSHX:  Diabetes  Breast CA  H/O mastectomy, bilateral  No significant past surgical history      Family history:  No pertinent family history in first degree relatives      Social History: Patient lives at home with her . No smoking, alcohol or illicit drug use    ROS: Unable to obtain - patient is intubated    PHYSICAL EXAM:     GENERAL:  Appears stated age  HEENT:  NC/AT, Orally intubated  CHEST:  Breath sounds present bilaterally; no crepitus of skin now  HEART:  Regular rhythm, S1, S2, no murmur  ABDOMEN:  Soft, non-tender, small drain in place in right lower abdomen  EXTREMITIES:  no cyanosis,clubbing or edema  SKIN:  No rash/erythema   NEURO:  Sedated/intubated    Vital Signs:  Vital Signs Last 24 Hrs  T(C): 36.9 (2018 07:00), Max: 37.9 (2018 16:00)  T(F): 98.4 (2018 07:00), Max: 100.2 (2018 16:00)  HR: 83 (2018 14:45) (66 - 94)  BP: 129/60 (2018 14:45) (90/41 - 141/63)  BP(mean): 87 (2018 14:45) (59 - 101)  RR: 24 (2018 14:45) (2 - 31)  SpO2: 99% (2018 14:45) (94% - 100%)  Daily     Daily Weight in k.8 (2018 04:00)    LABS:                        9.3    11.4  )-----------( 164      ( 2018 12:44 )             27.8     02    133<L>  |  95<L>  |  61<H>  ----------------------------<  229<H>  5.2   |  18<L>  |  4.04<H>    Ca    7.4<L>      2018 12:44  Phos  9.7     02-  Mg     2.4     02-    TPro  6.3  /  Alb  2.6<L>  /  TBili  0.8  /  DBili  x   /  AST  <5<L>  /  ALT  2916<H>  /  AlkPhos  76  02-    LIVER FUNCTIONS - ( 2018 12:44 )  Alb: 2.6 g/dL / Pro: 6.3 g/dL / ALK PHOS: 76 U/L / ALT: 2916 U/L RC / AST: <5 U/L / GGT: x           PT/INR - ( 2018 12:44 )   PT: 15.6 sec;   INR: 1.43 ratio         PTT - ( 2018 12:44 )  PTT:32.5 sec        Imaging:      < from: CT Head No Cont (18 @ 22:02) >  IMPRESSION: Interval demarcation of acute left PCA territory infarct   primarily involving the left occipital lobe. No CT evidence of   hemorrhagic transformation.    < end of copied text >      < from: CT Neck Soft Tissue No Cont (18 @ 03:42) >  CT NECK:    Extensive subcutaneous emphysema in the neck as described. No gross   evidence for abscess or hematoma.    Please see separate chest CT report for description of the thoracic   findings.    < end of copied text >      < from: CT Abdomen and Pelvis w/ Oral Cont (18 @ 03:22) >    *** ADDENDUM 2018  ***    Correction to initial report. Third point of the impression should read   as follows:    Amorphous focal dilated loop of small bowel in the lower abdomen. Given   clinical history of cardiac arrest, diffuse atherosclerotic disease, and   elevated lactate, findings are concerning for ischemic bowel.    Dr. Marx discussed the findings with Dr. Luu on 2018 at   10:58 AM.  Readback was obtained.        *** END OF ADDENDUM 2018  ***      PROCEDURE DATE:  2018            INTERPRETATION:  CLINICAL INFORMATION: Free air on chest x-ray.    COMPARISON: None.    PROCEDURE:   CT of the Chest, Abdomen and Pelvis was performed without intravenous   contrast.   Intravenous contrast: None.  Oral contrast: positive contrast was administered.  Sagittal and coronal reformats were performed.    FINDINGS:    CHEST:     LUNGS/LARGE AIRWAYS/PLEURA: Endotracheal tube with tip entering the right   mainstem bronchus. 2 right-sided pigtail catheters, one terminating   anteriorly in the lung parenchyma, and 1 terminating posteriorly,   probably in the pleural space. Left apical pigtail catheter terminating   in the lung parenchyma. No pneumothorax. Patchy consolidation in both   lungs, right greater than left. An area of cavitation is present in the   right upper lobe.  VESSELS: Atherosclerotic calcifications.   HEART: Heart size is normal. No pericardial effusion.  MEDIASTINUM AND JUSTINO: Extensive pneumomediastinum. Subcentimeter short   axis mediastinal lymph nodes.  CHEST WALL AND LOWER NECK: Extensive subcutaneous emphysema.    ABDOMEN AND PELVIS:    LIVER: Within normal limits.  BILE DUCTS: Normal caliber.   GALLBLADDER: Within normal limits.  SPLEEN: Within normal limits.  PANCREAS: Within normal limits.  ADRENALS: Within normal limits.  KIDNEYS/URETERS: Within normal limits.     BLADDER: Collapsed around a Wheeler catheter balloon.  REPRODUCTIVE ORGANS: The uterus and adnexa are within normal limits.    BOWEL: Feeding tube terminating in the stomach. No bowel obstruction.   Focal dilated loop of small bowel in the lower abdomen. No pneumatosis or   portal venous gas.  PERITONEUM: Large volume pneumoperitoneum.  VESSELS:  Atherosclerotic change of the abdominal aorta and its branches.  RETROPERITONEUM: No lymphadenopathy.    ABDOMINAL WALL: Extensive subcutaneous emphysema.  BONES: Degenerative changes of the spine.    IMPRESSION:   Malpositioned endotracheal tube with tip in the right mainstem bronchus.    Multifocal consolidation in both lungs, probably pneumonia. Area of   cavitation is present in the right upper lobe.    Focal dilated loops of small bowel in the lower abdomen, of uncertain   etiology.    Large volume pneumoperitoneum without extraluminal fluid or contrast,   possibly extension of pneumothorax/pneumomediastinum into the abdominal   cavity.    Extensive subcutaneous emphysema in the chest and abdominal wall.    Tips of the bilateral pigtail catheters may terminate in the lung   parenchyma.    Dr. Evans discussed the emergent findings with Dr. Ibarra on 2018 at 2:00 AM.  Readback was obtained.      < end of copied text >      < from: Xray Abdomen 1 View-Upright Only (18 @ 10:31) >    IMPRESSION:   Unchanged pneumoperitoneum and diffuse subcutaneous emphysema.    < end of copied text >

## 2018-02-02 ENCOUNTER — TRANSCRIPTION ENCOUNTER (OUTPATIENT)
Age: 68
End: 2018-02-02

## 2018-02-02 LAB
ALBUMIN SERPL ELPH-MCNC: 2.3 G/DL — LOW (ref 3.3–5)
ALBUMIN SERPL ELPH-MCNC: 2.4 G/DL — LOW (ref 3.3–5)
ALP SERPL-CCNC: 74 U/L — SIGNIFICANT CHANGE UP (ref 40–120)
ALP SERPL-CCNC: 75 U/L — SIGNIFICANT CHANGE UP (ref 40–120)
ALT FLD-CCNC: 2652 U/L RC — HIGH (ref 10–45)
ALT FLD-CCNC: 3171 U/L RC — HIGH (ref 10–45)
ANION GAP SERPL CALC-SCNC: 16 MMOL/L — SIGNIFICANT CHANGE UP (ref 5–17)
ANION GAP SERPL CALC-SCNC: 20 MMOL/L — HIGH (ref 5–17)
APTT BLD: 25.4 SEC — LOW (ref 27.5–37.4)
APTT BLD: 28.1 SEC — SIGNIFICANT CHANGE UP (ref 27.5–37.4)
AST SERPL-CCNC: 2069 U/L — HIGH (ref 10–40)
AST SERPL-CCNC: 5056 U/L — HIGH (ref 10–40)
BILIRUB SERPL-MCNC: 1.2 MG/DL — SIGNIFICANT CHANGE UP (ref 0.2–1.2)
BILIRUB SERPL-MCNC: 1.3 MG/DL — HIGH (ref 0.2–1.2)
BUN SERPL-MCNC: 51 MG/DL — HIGH (ref 7–23)
BUN SERPL-MCNC: 61 MG/DL — HIGH (ref 7–23)
CALCIUM SERPL-MCNC: 7.8 MG/DL — LOW (ref 8.4–10.5)
CALCIUM SERPL-MCNC: 8 MG/DL — LOW (ref 8.4–10.5)
CHLORIDE SERPL-SCNC: 95 MMOL/L — LOW (ref 96–108)
CHLORIDE SERPL-SCNC: 97 MMOL/L — SIGNIFICANT CHANGE UP (ref 96–108)
CK MB BLD-MCNC: 0.6 % — SIGNIFICANT CHANGE UP (ref 0–3.5)
CK MB BLD-MCNC: 0.7 % — SIGNIFICANT CHANGE UP (ref 0–3.5)
CK MB CFR SERPL CALC: 2.2 NG/ML — SIGNIFICANT CHANGE UP (ref 0–3.8)
CK MB CFR SERPL CALC: 3.3 NG/ML — SIGNIFICANT CHANGE UP (ref 0–3.8)
CK SERPL-CCNC: 316 U/L — HIGH (ref 25–170)
CK SERPL-CCNC: 592 U/L — HIGH (ref 25–170)
CO2 SERPL-SCNC: 18 MMOL/L — LOW (ref 22–31)
CO2 SERPL-SCNC: 22 MMOL/L — SIGNIFICANT CHANGE UP (ref 22–31)
CREAT SERPL-MCNC: 3.59 MG/DL — HIGH (ref 0.5–1.3)
CREAT SERPL-MCNC: 4.22 MG/DL — HIGH (ref 0.5–1.3)
GAS PNL BLDA: SIGNIFICANT CHANGE UP
GLUCOSE BLDC GLUCOMTR-MCNC: 112 MG/DL — HIGH (ref 70–99)
GLUCOSE BLDC GLUCOMTR-MCNC: 119 MG/DL — HIGH (ref 70–99)
GLUCOSE BLDC GLUCOMTR-MCNC: 124 MG/DL — HIGH (ref 70–99)
GLUCOSE BLDC GLUCOMTR-MCNC: 213 MG/DL — HIGH (ref 70–99)
GLUCOSE SERPL-MCNC: 189 MG/DL — HIGH (ref 70–99)
GLUCOSE SERPL-MCNC: 191 MG/DL — HIGH (ref 70–99)
GRAM STN FLD: SIGNIFICANT CHANGE UP
HAV IGM SER-ACNC: SIGNIFICANT CHANGE UP
HBV CORE IGM SER-ACNC: SIGNIFICANT CHANGE UP
HBV SURFACE AG SER-ACNC: SIGNIFICANT CHANGE UP
HCT VFR BLD CALC: 23.8 % — LOW (ref 34.5–45)
HCT VFR BLD CALC: 24.3 % — LOW (ref 34.5–45)
HCV AB S/CO SERPL IA: 0.16 S/CO — SIGNIFICANT CHANGE UP
HCV AB SERPL-IMP: SIGNIFICANT CHANGE UP
HGB BLD-MCNC: 8.3 G/DL — LOW (ref 11.5–15.5)
HGB BLD-MCNC: 8.7 G/DL — LOW (ref 11.5–15.5)
INR BLD: 1.12 RATIO — SIGNIFICANT CHANGE UP (ref 0.88–1.16)
INR BLD: 1.28 RATIO — HIGH (ref 0.88–1.16)
MAGNESIUM SERPL-MCNC: 2.2 MG/DL — SIGNIFICANT CHANGE UP (ref 1.6–2.6)
MAGNESIUM SERPL-MCNC: 2.3 MG/DL — SIGNIFICANT CHANGE UP (ref 1.6–2.6)
MCHC RBC-ENTMCNC: 30.7 PG — SIGNIFICANT CHANGE UP (ref 27–34)
MCHC RBC-ENTMCNC: 31.7 PG — SIGNIFICANT CHANGE UP (ref 27–34)
MCHC RBC-ENTMCNC: 35 GM/DL — SIGNIFICANT CHANGE UP (ref 32–36)
MCHC RBC-ENTMCNC: 35.9 GM/DL — SIGNIFICANT CHANGE UP (ref 32–36)
MCV RBC AUTO: 87.9 FL — SIGNIFICANT CHANGE UP (ref 80–100)
MCV RBC AUTO: 88.2 FL — SIGNIFICANT CHANGE UP (ref 80–100)
PHOSPHATE SERPL-MCNC: 7.2 MG/DL — HIGH (ref 2.5–4.5)
PHOSPHATE SERPL-MCNC: 7.4 MG/DL — HIGH (ref 2.5–4.5)
PLATELET # BLD AUTO: 130 K/UL — LOW (ref 150–400)
PLATELET # BLD AUTO: 131 K/UL — LOW (ref 150–400)
POTASSIUM SERPL-MCNC: 4.4 MMOL/L — SIGNIFICANT CHANGE UP (ref 3.5–5.3)
POTASSIUM SERPL-MCNC: 4.4 MMOL/L — SIGNIFICANT CHANGE UP (ref 3.5–5.3)
POTASSIUM SERPL-SCNC: 4.4 MMOL/L — SIGNIFICANT CHANGE UP (ref 3.5–5.3)
POTASSIUM SERPL-SCNC: 4.4 MMOL/L — SIGNIFICANT CHANGE UP (ref 3.5–5.3)
PROT SERPL-MCNC: 6 G/DL — SIGNIFICANT CHANGE UP (ref 6–8.3)
PROT SERPL-MCNC: 6 G/DL — SIGNIFICANT CHANGE UP (ref 6–8.3)
PROTHROM AB SERPL-ACNC: 12.2 SEC — SIGNIFICANT CHANGE UP (ref 9.8–12.7)
PROTHROM AB SERPL-ACNC: 13.9 SEC — HIGH (ref 9.8–12.7)
RBC # BLD: 2.71 M/UL — LOW (ref 3.8–5.2)
RBC # BLD: 2.76 M/UL — LOW (ref 3.8–5.2)
RBC # FLD: 12.1 % — SIGNIFICANT CHANGE UP (ref 10.3–14.5)
RBC # FLD: 12.2 % — SIGNIFICANT CHANGE UP (ref 10.3–14.5)
SODIUM SERPL-SCNC: 133 MMOL/L — LOW (ref 135–145)
SODIUM SERPL-SCNC: 135 MMOL/L — SIGNIFICANT CHANGE UP (ref 135–145)
SPECIMEN SOURCE: SIGNIFICANT CHANGE UP
TROPONIN T SERPL-MCNC: 0.05 NG/ML — SIGNIFICANT CHANGE UP (ref 0–0.06)
TROPONIN T SERPL-MCNC: 0.07 NG/ML — HIGH (ref 0–0.06)
VANCOMYCIN FLD-MCNC: 12.5 UG/ML — SIGNIFICANT CHANGE UP
WBC # BLD: 4.8 K/UL — SIGNIFICANT CHANGE UP (ref 3.8–10.5)
WBC # BLD: 6.2 K/UL — SIGNIFICANT CHANGE UP (ref 3.8–10.5)
WBC # FLD AUTO: 4.8 K/UL — SIGNIFICANT CHANGE UP (ref 3.8–10.5)
WBC # FLD AUTO: 6.2 K/UL — SIGNIFICANT CHANGE UP (ref 3.8–10.5)

## 2018-02-02 PROCEDURE — 71045 X-RAY EXAM CHEST 1 VIEW: CPT | Mod: 26,77

## 2018-02-02 PROCEDURE — 90935 HEMODIALYSIS ONE EVALUATION: CPT | Mod: GC

## 2018-02-02 PROCEDURE — 32551 INSERTION OF CHEST TUBE: CPT | Mod: 59,GC

## 2018-02-02 PROCEDURE — 99223 1ST HOSP IP/OBS HIGH 75: CPT

## 2018-02-02 PROCEDURE — 99292 CRITICAL CARE ADDL 30 MIN: CPT

## 2018-02-02 PROCEDURE — 99291 CRITICAL CARE FIRST HOUR: CPT

## 2018-02-02 PROCEDURE — 93970 EXTREMITY STUDY: CPT | Mod: 26

## 2018-02-02 PROCEDURE — 71045 X-RAY EXAM CHEST 1 VIEW: CPT | Mod: 26

## 2018-02-02 RX ORDER — HYDRALAZINE HCL 50 MG
10 TABLET ORAL ONCE
Qty: 0 | Refills: 0 | Status: DISCONTINUED | OUTPATIENT
Start: 2018-02-02 | End: 2018-02-02

## 2018-02-02 RX ORDER — SENNA PLUS 8.6 MG/1
2 TABLET ORAL AT BEDTIME
Qty: 0 | Refills: 0 | Status: DISCONTINUED | OUTPATIENT
Start: 2018-02-02 | End: 2018-02-07

## 2018-02-02 RX ORDER — FENTANYL CITRATE 50 UG/ML
100 INJECTION INTRAVENOUS ONCE
Qty: 0 | Refills: 0 | Status: DISCONTINUED | OUTPATIENT
Start: 2018-02-02 | End: 2018-02-02

## 2018-02-02 RX ORDER — CHLORHEXIDINE GLUCONATE 213 G/1000ML
15 SOLUTION TOPICAL EVERY 4 HOURS
Qty: 0 | Refills: 0 | Status: DISCONTINUED | OUTPATIENT
Start: 2018-02-02 | End: 2018-02-21

## 2018-02-02 RX ORDER — POLYETHYLENE GLYCOL 3350 17 G/17G
17 POWDER, FOR SOLUTION ORAL
Qty: 0 | Refills: 0 | Status: DISCONTINUED | OUTPATIENT
Start: 2018-02-02 | End: 2018-02-07

## 2018-02-02 RX ORDER — FENTANYL CITRATE 50 UG/ML
0.5 INJECTION INTRAVENOUS
Qty: 5000 | Refills: 0 | Status: DISCONTINUED | OUTPATIENT
Start: 2018-02-02 | End: 2018-02-03

## 2018-02-02 RX ADMIN — SENNA PLUS 2 TABLET(S): 8.6 TABLET ORAL at 21:25

## 2018-02-02 RX ADMIN — Medication 4: at 12:37

## 2018-02-02 RX ADMIN — POLYETHYLENE GLYCOL 3350 17 GRAM(S): 17 POWDER, FOR SOLUTION ORAL at 17:47

## 2018-02-02 RX ADMIN — Medication 2: at 05:32

## 2018-02-02 RX ADMIN — Medication 30 MILLIGRAM(S): at 05:12

## 2018-02-02 RX ADMIN — Medication 81 MILLIGRAM(S): at 12:10

## 2018-02-02 RX ADMIN — PANTOPRAZOLE SODIUM 40 MILLIGRAM(S): 20 TABLET, DELAYED RELEASE ORAL at 03:00

## 2018-02-02 RX ADMIN — Medication 3 MILLILITER(S): at 18:21

## 2018-02-02 RX ADMIN — PIPERACILLIN AND TAZOBACTAM 25 GRAM(S): 4; .5 INJECTION, POWDER, LYOPHILIZED, FOR SOLUTION INTRAVENOUS at 20:41

## 2018-02-02 RX ADMIN — Medication 40 MILLIGRAM(S): at 12:11

## 2018-02-02 RX ADMIN — Medication 30 MILLIGRAM(S): at 17:48

## 2018-02-02 RX ADMIN — FENTANYL CITRATE 100 MICROGRAM(S): 50 INJECTION INTRAVENOUS at 12:11

## 2018-02-02 RX ADMIN — HEPARIN SODIUM 5000 UNIT(S): 5000 INJECTION INTRAVENOUS; SUBCUTANEOUS at 09:18

## 2018-02-02 RX ADMIN — HEPARIN SODIUM 5000 UNIT(S): 5000 INJECTION INTRAVENOUS; SUBCUTANEOUS at 23:47

## 2018-02-02 RX ADMIN — Medication 3 MILLILITER(S): at 11:39

## 2018-02-02 RX ADMIN — FENTANYL CITRATE 100 MICROGRAM(S): 50 INJECTION INTRAVENOUS at 09:09

## 2018-02-02 RX ADMIN — PIPERACILLIN AND TAZOBACTAM 25 GRAM(S): 4; .5 INJECTION, POWDER, LYOPHILIZED, FOR SOLUTION INTRAVENOUS at 09:18

## 2018-02-02 RX ADMIN — Medication 3 MILLILITER(S): at 05:12

## 2018-02-02 RX ADMIN — HEPARIN SODIUM 5000 UNIT(S): 5000 INJECTION INTRAVENOUS; SUBCUTANEOUS at 17:46

## 2018-02-02 RX ADMIN — CHLORHEXIDINE GLUCONATE 15 MILLILITER(S): 213 SOLUTION TOPICAL at 04:00

## 2018-02-02 RX ADMIN — PANTOPRAZOLE SODIUM 40 MILLIGRAM(S): 20 TABLET, DELAYED RELEASE ORAL at 14:31

## 2018-02-02 RX ADMIN — FENTANYL CITRATE 100 MICROGRAM(S): 50 INJECTION INTRAVENOUS at 10:08

## 2018-02-02 RX ADMIN — INSULIN GLARGINE 15 UNIT(S): 100 INJECTION, SOLUTION SUBCUTANEOUS at 21:25

## 2018-02-02 NOTE — DISCHARGE NOTE ADULT - MEDICATION SUMMARY - MEDICATIONS TO TAKE
I will START or STAY ON the medications listed below when I get home from the hospital:    acetaminophen 160 mg/5 mL oral suspension  -- 20.31 milliliter(s) by gastrostomy tube every 6 hours, As Needed - 3)  -- Indication: For Pain    aspirin 81 mg oral tablet, chewable  -- 1 tab(s) by gastrostomy tube once a day  -- Indication: For Secondary stroke prevention    heparin  -- 5000 unit(s) subcutaneous every 8 hours  -- Indication: For Dvt ppx    insulin isophane (NPH) 100 units/mL human recombinant subcutaneous suspension  -- 13 unit(s) subcutaneous every 6 hours  -- Indication: For Diabetes    metoclopramide 10 mg oral tablet  -- 1 tab(s) by gastrostomy tube every 8 hours  -- Indication: For gastroparesis    labetalol  -- 250 milligram(s) by gastrostomy tube 3 times a day  -- Indication: For Blood pressure    ipratropium-albuterol 0.5 mg-2.5 mg/3 mLinhalation solution  -- 3 milliliter(s) inhaled every 6 hours  -- Indication: For Sob/wheezing    calamine topical lotion  -- 1 application on skin once a day  -- Indication: For Dry itchy skin    hydrocortisone 1% topical ointment  -- 1 application on skin every 8 hours, As needed, Rash and/or Itching  -- Indication: For Dry itchy skin    nystatin 100,000 units/g topical powder  -- 1 application on skin 2 times a day  -- Indication: For Fungal rash    epoetin odalis  -- 90948 unit(s) subcutaneously once a week on Weds  -- Indication: For RBC production I will START or STAY ON the medications listed below when I get home from the hospital:    aspirin 81 mg oral tablet, chewable  -- 1 tab(s) by gastrostomy tube once a day  -- Indication: For Secondary stroke prevention    acetaminophen 160 mg/5 mL oral suspension  -- 20.31 milliliter(s) by gastrostomy tube every 6 hours, As Needed - 3)  -- Indication: For Pain    cloNIDine 0.1 mg oral tablet  -- 1 tab(s) by mouth 2 times a day, hold SBP<120  -- Indication: For HTN    heparin  -- 5000 unit(s) subcutaneous every 8 hours  -- Indication: For Dvt ppx    insulin isophane (NPH) 100 units/mL human recombinant subcutaneous suspension  -- 13 unit(s) subcutaneous every 6 hours  -- Indication: For Diabetes    metoclopramide 10 mg oral tablet  -- 1 tab(s) by gastrostomy tube every 8 hours  -- Indication: For gastroparesis    labetalol  -- 250 milligram(s) by gastrostomy tube 3 times a day, hold SBP<120 or HR<60  -- Indication: For Acute hypoxemic respiratory failure    ipratropium-albuterol 0.5 mg-2.5 mg/3 mLinhalation solution  -- 3 milliliter(s) inhaled every 6 hours  -- Indication: For Sob/wheezing    calamine topical lotion  -- 1 application on skin once a day  -- Indication: For Dry itchy skin    hydrocortisone 1% topical ointment  -- 1 application on skin every 8 hours, As needed, Rash and/or Itching  -- Indication: For Dry itchy skin    nystatin 100,000 units/g topical powder  -- 1 application on skin 2 times a day  -- Indication: For Fungal rash    epoetin odalis  -- 54095 unit(s) subcutaneously once a week on Weds  -- Indication: For RBC production    ferrous sulfate 324 mg (65 mg elemental iron) oral tablet  -- 1 tab(s) by mouth 3 times a day  -- Indication: For Anemia    Renvela 800 mg oral tablet  -- 1 tab(s) by mouth 3 times a day (with meals)  -- Indication: For Hyperphosphatemia I will START or STAY ON the medications listed below when I get home from the hospital:    aspirin 81 mg oral tablet, chewable  -- 1 tab(s) by gastrostomy tube once a day  -- Indication: For Secondary stroke prevention    acetaminophen 160 mg/5 mL oral suspension  -- 20.31 milliliter(s) by gastrostomy tube every 6 hours, As Needed - 3)  -- Indication: For Pain    cloNIDine 0.1 mg oral tablet  -- 1 tab(s) by mouth 2 times a day, hold SBP<120  -- Indication: For HTN    heparin  -- 5000 unit(s) subcutaneous every 8 hours  -- Indication: For Dvt ppx    insulin isophane (NPH) 100 units/mL human recombinant subcutaneous suspension  -- 13 unit(s) subcutaneous every 6 hours  -- Indication: For Diabetes    metoclopramide 10 mg oral tablet  -- 1 tab(s) by gastrostomy tube every 8 hours  -- Indication: For gastroparesis    labetalol  -- 250 milligram(s) by gastrostomy tube 3 times a day, hold SBP<120 or HR<60  -- Indication: For Acute hypoxemic respiratory failure    ipratropium-albuterol 0.5 mg-2.5 mg/3 mLinhalation solution  -- 3 milliliter(s) inhaled every 6 hours  -- Indication: For Sob/wheezing    calamine topical lotion  -- 1 application on skin once a day  -- Indication: For Dry itchy skin    hydrocortisone 1% topical ointment  -- 1 application on skin every 8 hours, As needed, Rash and/or Itching  -- Indication: For Dry itchy skin    nystatin 100,000 units/g topical powder  -- 1 application on skin 2 times a day  -- Indication: For Fungal rash    epoetin odalis  -- 44304 unit(s) subcutaneously once a week on Weds  -- Indication: For RBC production    ferrous sulfate 324 mg (65 mg elemental iron) oral tablet  -- 1 tab(s) by mouth 3 times a day  -- Indication: For Anemia    Renvela 800 mg oral tablet  -- 1 tab(s) by mouth 3 times a day (with meals)  -- Indication: For Hyperphosphatemia I will START or STAY ON the medications listed below when I get home from the hospital:    aspirin 81 mg oral tablet, chewable  -- 1 tab(s) by gastrostomy tube once a day  -- Indication: For Secondary stroke prevention    acetaminophen 160 mg/5 mL oral suspension  -- 20.31 milliliter(s) by gastrostomy tube every 6 hours, As Needed - 3)  -- Indication: For Pain    cloNIDine 0.1 mg oral tablet  -- 1 tab(s) by mouth 2 times a day, hold SBP<120  -- Indication: For HTN    heparin  -- 5000 unit(s) subcutaneous every 8 hours  -- Indication: For Dvt ppx    insulin isophane (NPH) 100 units/mL human recombinant subcutaneous suspension  -- 13 unit(s) subcutaneous every 6 hours  -- Indication: For Diabetes    metoclopramide 10 mg oral tablet  -- 1 tab(s) by gastrostomy tube every 8 hours  -- Indication: For gastroparesis    labetalol  -- 250 milligram(s) by gastrostomy tube 3 times a day, hold SBP<120 or HR<60  -- Indication: For Acute hypoxemic respiratory failure    ipratropium-albuterol 0.5 mg-2.5 mg/3 mLinhalation solution  -- 3 milliliter(s) inhaled every 6 hours  -- Indication: For Sob/wheezing    calamine topical lotion  -- 1 application on skin once a day  -- Indication: For Dry itchy skin    hydrocortisone 1% topical ointment  -- 1 application on skin every 8 hours, As needed, Rash and/or Itching  -- Indication: For Dry itchy skin    nystatin 100,000 units/g topical powder  -- 1 application on skin 2 times a day  -- Indication: For Fungal rash    epoetin odalis  -- 22442 unit(s) subcutaneously once a week on Weds  -- Indication: For RBC production    ferrous sulfate 324 mg (65 mg elemental iron) oral tablet  -- 1 tab(s) by mouth 3 times a day  -- Indication: For Anemia    Senna leaf extract 176 mg/5 mL oral syrup  -- 10 milliliter(s) by mouth once a day (at bedtime), As Needed constipation   -- Indication: For Constipation     Renvela 800 mg oral tablet  -- 1 tab(s) by mouth 3 times a day (with meals)  -- Indication: For Hyperphosphatemia

## 2018-02-02 NOTE — DISCHARGE NOTE ADULT - SECONDARY DIAGNOSIS.
CVA (cerebral vascular accident) Essential hypertension Renal failure Type 2 diabetes mellitus with hyperglycemia, with long-term current use of insulin Breast CA

## 2018-02-02 NOTE — PROGRESS NOTE ADULT - ASSESSMENT
67F w PMH of bilateral Breast CA (on Trastuzumab, last dose Jan 20th) admitted to MICU with influenza PNA c/b PEA arrest x 2 with ROSC x 2 (10 min + 30 min). CPR c/b bilateral PTX (s/p bilateral chest tubes), pneumoperitoneum (s/p Peritoneal drain (pigtail) in right hemiabdomen and paracentesis decompression) a/w extensive subcutaneous emphysema. Pt received 100 mg of empiric TPA in ED. Surgery consulted and not recommending surgical intervention.   All 3 chest tubes removed on 1/31. Pt had cardiac MRI on 2/1 which was wnl. Right sided IJ removed and right shiley placed and HD initiated on 2/1. Abdominal drain removed on 2/1.     Neurologic:  Alteration of mental status present. Likely due possible structural anoxic brain injury due to prolonged PEA arrest x2 + CTH after TPA showing evidence of new small acute left PCA territory infarct without hemorrhagic conversion. Pt on minimal sedation today (dexmedetomidine).  Repeat CTH after 24 hours showing no acute changes. Pt following simple commands minimally. Opens eyes but does not track.      Skin:  Lines: Right IJ Shiley  Decubiti: None    GI:  Diet: pt NPO  Pt with pneumoperitoneum potentially secondary to pneumothorax communication. Imaging showing no evidence of viscous perforation.   Surgery and CT surgery not recommending any intervention.   GI stress prophylaxis indicated pantoprazole 40IV daily    Metabolic:  BMP showing evidence of RUSS 2/2 ATN 2/2 PEA arrest  Liver functions tests show ischemic hepatopathy with transaminitis 2/2 hypoxic insult 2/2 PEA arrest. LFTs trending down.   02-02    133<L>  |  95<L>  |  51<H>  ----------------------------<  191<H>  4.4   |  18<L>  |  3.59<H>    Ca    7.8<L>      02 Feb 2018 04:42  Phos  7.4     02-02  Mg     2.3     02-02    LIVER FUNCTIONS - ( 02 Feb 2018 04:42 )  Alb: 2.3 g/dL / Pro: 6.0 g/dL / ALK PHOS: 75 U/L / ALT: 3171 U/L RC / AST: 5056 U/L / GGT: x           Patient with evidence of shock liver 2/2 to prolonged PEA arrest. ALT and AST both in the thousands.       Volume Assessment:  No peripheral edema  No EVLW on US  No evidence of disturbance of effective circulating volume on US    Hematologic:  DVT prophylaxis with HSQ                        9.2    23.5  )-----------( 258      ( 31 Jan 2018 05:33 )             26.9     PT/INR - ( 31 Jan 2018 05:33 )   PT: 19.9 sec;   INR: 1.80 ratio         PTT - ( 31 Jan 2018 05:33 )  PTT:34.2 sec    Infectious Disease:  c/w oseltamivir day 3/5  c/w pip tazo 3/7    Hemodynamics:  Patient is on pressors (norepi) due to unclear cause. Possible vasoplegic shock 2/2 to infection. No evidence to support cardiogenic shock on cardiac MRI. No source of infection. Not on medications which could cause hypotension.     Cardiovascular:  GDE unable to perform due subcut emphysema and anterior chest wall bandages.  Cardiac MRI unremarkable. Shows normal RV and LV function.       Respiratory:  Pt intubated  Lung US showing focal B lines   Chest tubes removed. Peritoneal drain removed. 67F w PMH of bilateral Breast CA (on Trastuzumab, last dose Jan 20th) admitted to MICU with influenza PNA c/b PEA arrest x 2 with ROSC x 2 (10 min + 30 min). CPR c/b bilateral PTX (s/p bilateral chest tubes), pneumoperitoneum (s/p Peritoneal drain (pigtail) in right hemiabdomen and paracentesis decompression) a/w extensive subcutaneous emphysema. Pt received 100 mg of empiric TPA in ED. Surgery consulted and not recommending surgical intervention.   All 3 chest tubes removed on 1/31. Pt had cardiac MRI on 2/1 which was wnl. Right sided IJ removed and right shiley placed and HD initiated on 2/1. Abdominal drain removed on 2/1.     Neurologic:  Alteration of mental status present. Likely due possible structural anoxic brain injury due to prolonged PEA arrest x2 + CTH after TPA showing evidence of new small acute left PCA territory infarct without hemorrhagic conversion. Pt on minimal sedation today (dexmedetomidine).  Repeat CTH after 24 hours showing no acute changes. Pt following simple commands minimally. Opens eyes but does not track.      Skin:  Lines: Right IJ Shiley  Decubiti: None    GI:  Diet: pt NPO  Pt with pneumoperitoneum potentially secondary to pneumothorax communication. Imaging showing no evidence of viscous perforation.   Surgery and CT surgery not recommending any intervention.   GI stress prophylaxis indicated pantoprazole 40IV daily    Metabolic:  BMP showing evidence of RUSS 2/2 ATN 2/2 PEA arrest  Liver functions tests show ischemic hepatopathy with transaminitis 2/2 hypoxic insult 2/2 PEA arrest. LFTs trending down.   02-02    133<L>  |  95<L>  |  51<H>  ----------------------------<  191<H>  4.4   |  18<L>  |  3.59<H>    Ca    7.8<L>      02 Feb 2018 04:42  Phos  7.4     02-02  Mg     2.3     02-02    LIVER FUNCTIONS - ( 02 Feb 2018 04:42 )  Alb: 2.3 g/dL / Pro: 6.0 g/dL / ALK PHOS: 75 U/L / ALT: 3171 U/L RC / AST: 5056 U/L / GGT: x           Patient with evidence of shock liver 2/2 to prolonged PEA arrest. ALT and AST both in the thousands.     CK trending down.    Volume Assessment:  No peripheral edema  No EVLW on US  No evidence of disturbance of effective circulating volume on US    Hematologic:  DVT prophylaxis with HSQ                        8.7    6.2   )-----------( 131      ( 02 Feb 2018 04:42 )             24.3     Thrombocytopenia    PT/INR - ( 02 Feb 2018 04:42 )   PT: 13.9 sec;   INR: 1.28 ratio    PTT - ( 02 Feb 2018 04:42 )  PTT:28.1 sec    Infectious Disease:  c/w oseltamivir day 3/5  c/w pip tazo 3/7    Hemodynamics:  Patient is on pressors (norepi) due to unclear cause. Possible vasoplegic shock 2/2 to infection. No evidence to support cardiogenic shock on cardiac MRI. No source of infection. Not on medications which could cause hypotension.     Cardiovascular:  GDE unable to perform due subcut emphysema and anterior chest wall bandages.  Cardiac MRI unremarkable. Shows normal RV and LV function.   Cardiac enzymes stable and trending down (.07) however pt with significant renal dysfunction      Respiratory:  Pt intubated  Lung US showing focal B lines   Chest tubes removed. Peritoneal drain removed. 67F w PMH of bilateral Breast CA (on Trastuzumab, last dose Jan 20th) admitted to MICU with influenza PNA c/b PEA arrest x 2 with ROSC x 2 (10 min + 30 min). CPR c/b bilateral PTX (s/p bilateral chest tubes), pneumoperitoneum (s/p Peritoneal drain (pigtail) in right hemiabdomen and paracentesis decompression) a/w extensive subcutaneous emphysema. Pt received 100 mg of empiric TPA in ED. Surgery consulted and not recommending surgical intervention.   All 3 chest tubes removed on 1/31. Pt had cardiac MRI on 2/1 which was wnl. Right sided IJ removed and right shiley placed and HD initiated on 2/1. Abdominal drain removed on 2/1. AM chest xray on 2/2 showed enlargement of previously noted left PTX. New Chest tube placed on left side. 	    Neurologic:  Alteration of mental status present. Likely due possible structural anoxic brain injury due to prolonged PEA arrest x2 + CTH after TPA showing evidence of new small acute left PCA territory infarct without hemorrhagic conversion. Pt to undergo sedation vacation today.  Repeat CTH after 24 hours showing no acute changes. Pt following simple commands minimally. Opens eyes but does not track.      Skin:  Lines: Right IJ Shiley  Decubiti: None    GI:  Diet: pt NPO  Pt with pneumoperitoneum potentially secondary to pneumothorax communication. Imaging showing no evidence of viscous perforation.   Surgery and CT surgery not recommending any intervention.   GI stress prophylaxis indicated pantoprazole 40IV daily    Metabolic:  BMP showing evidence of RUSS 2/2 ATN 2/2 PEA arrest  Liver functions tests show ischemic hepatopathy with transaminitis 2/2 hypoxic insult 2/2 PEA arrest. LFTs trending down.   02-02    133<L>  |  95<L>  |  51<H>  ----------------------------<  191<H>  4.4   |  18<L>  |  3.59<H>    Ca    7.8<L>      02 Feb 2018 04:42  Phos  7.4     02-02  Mg     2.3     02-02    LIVER FUNCTIONS - ( 02 Feb 2018 04:42 )  Alb: 2.3 g/dL / Pro: 6.0 g/dL / ALK PHOS: 75 U/L / ALT: 3171 U/L RC / AST: 5056 U/L / GGT: x           Patient with evidence of shock liver 2/2 to prolonged PEA arrest. ALT and AST both in the thousands.     CK trending down.    Volume Assessment:  No peripheral edema  No EVLW on US  No evidence of disturbance of effective circulating volume on US    Hematologic:  DVT prophylaxis with HSQ                        8.7    6.2   )-----------( 131      ( 02 Feb 2018 04:42 )             24.3     Thrombocytopenia    PT/INR - ( 02 Feb 2018 04:42 )   PT: 13.9 sec;   INR: 1.28 ratio    PTT - ( 02 Feb 2018 04:42 )  PTT:28.1 sec    Infectious Disease:  c/w oseltamivir day 3/5  c/w pip tazo 3/7    Hemodynamics:  Patient is not on pressors.    Cardiovascular:  GDE unable to perform due subcut emphysema and anterior chest wall bandages.  Cardiac MRI unremarkable. Shows normal RV and LV function.   Cardiac enzymes stable and trending down (.07) however pt with significant renal dysfunction      Respiratory:  Pt intubated  Lung US showing focal B lines   Chest tubes removed. Peritoneal drain removed. 67F w PMH of bilateral Breast CA (on Trastuzumab, last dose Jan 20th) admitted to MICU with influenza PNA c/b PEA arrest x 2 with ROSC x 2 (10 min + 30 min). CPR c/b bilateral PTX (s/p bilateral chest tubes), pneumoperitoneum (s/p Peritoneal drain (pigtail) in right hemiabdomen and paracentesis decompression) a/w extensive subcutaneous emphysema. Pt received 100 mg of empiric TPA in ED. Surgery consulted and not recommending surgical intervention.   All 3 chest tubes removed on 1/31. Pt had cardiac MRI on 2/1 which was wnl. Right sided IJ removed and right shiley placed and HD initiated on 2/1. Abdominal drain removed on 2/1. AM chest xray on 2/2 showed enlargement of previously noted left PTX. New Chest tube placed on left side. 	    Neurologic:  Alteration of mental status present. Likely due possible structural anoxic brain injury due to prolonged PEA arrest x2 + CTH after TPA showing evidence of new small acute left PCA territory infarct without hemorrhagic conversion. Pt to undergo sedation vacation today.  Repeat CTH after 24 hours showing no acute changes. Pt following simple commands minimally. Opens eyes but does not track.      Skin:  Lines: Right IJ Shiley  Decubiti: None    GI:  Diet: pt NPO  Pt with pneumoperitoneum potentially secondary to pneumothorax communication. Imaging showing no evidence of viscous perforation.   Surgery and CT surgery not recommending any intervention.   GI stress prophylaxis indicated pantoprazole 40IV daily    Metabolic:  BMP showing evidence of RUSS 2/2 ATN 2/2 PEA arrest  Liver functions tests show ischemic hepatopathy with transaminitis 2/2 hypoxic insult 2/2 PEA arrest. LFTs trending down.   02-02    133<L>  |  95<L>  |  51<H>  ----------------------------<  191<H>  4.4   |  18<L>  |  3.59<H>    Ca    7.8<L>      02 Feb 2018 04:42  Phos  7.4     02-02  Mg     2.3     02-02    LIVER FUNCTIONS - ( 02 Feb 2018 04:42 )  Alb: 2.3 g/dL / Pro: 6.0 g/dL / ALK PHOS: 75 U/L / ALT: 3171 U/L RC / AST: 5056 U/L / GGT: x           Patient with evidence of shock liver 2/2 to prolonged PEA arrest. ALT and AST both in the thousands.     CK trending down.    Volume Assessment:  No peripheral edema  No EVLW on US  No evidence of disturbance of effective circulating volume on US    Hematologic:  DVT prophylaxis with HSQ                        8.7    6.2   )-----------( 131      ( 02 Feb 2018 04:42 )             24.3     Thrombocytopenia    PT/INR - ( 02 Feb 2018 04:42 )   PT: 13.9 sec;   INR: 1.28 ratio    PTT - ( 02 Feb 2018 04:42 )  PTT:28.1 sec    Infectious Disease:  c/w oseltamivir day 3/5  c/w pip tazo 3/7    Hemodynamics:  Patient is not on pressors.    Cardiovascular:  GDE unable to perform due subcut emphysema and anterior chest wall bandages.  Cardiac MRI unremarkable. Shows normal RV and LV function.   Cardiac enzymes stable and trending down (.07) however pt with significant renal dysfunction      Respiratory:  Pt remains intubated on low FIO2 requirement with PEEP of 5. AM chest xray on 2/2 showed enlargement of previously noted chest xray. New left sided chest tube placed successfully and confirmed by repeat xray. Chest tube started on suction. Serial chest xrays. No tracheal deviation or hypotension.

## 2018-02-02 NOTE — DISCHARGE NOTE ADULT - HOSPITAL COURSE
67F PMH HTN, DMT2 (70/30 TDD: 90 on admission), Breast Cancer (diagnosed in Feb 2017) currently on Herceptin (last dose Jan 20th), originally presented 1/30 with Fever found to have influenza subsequently went into PEA arrest x 2 with ROSC, complicated by bilateral pneumothoraces s/p 3 chest tubes and paracentesis decompression of the abdomen, then found to have PE s/p tPA 1/30, also s/p new CVA, and also new ESRD requiring almost daily HD.     First MICU admission:  Patient was admitted to MICU for further management. Patient was found to have acute renal failure and shock liver. Nephrology was consulted and patient was started on dialysis. Patient was started on Tamiflu for flu and zosyn for possible aspiration pneumonia. Patients chest tubes and peritoneal drain were removed. Patient redeveloped a pneumothorax on the L side and chest tube was reinserted. 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 Urine legionella was negative and azithromycin was discontinued. Patient continued to be anuric and continued to receive dialysis. Shock liver resolved. Patient was extubated on 2/8/17. Patient completed a 5 day course of tamiflu and 7 day course of zosyn and did not show signs of infection. Patients pneumothorax on L side resolved and chest tube was removed. Patients mental status improved and was alert and oriented x1-2 and spontaneously moves all extremities. Patient had an NG tube placed pending an official speech and swallow eval. Patient failed speech and swallow an a repeat speech and swallow was planned. Patient was then transferred to the floors for further management.     Second MICU admission on 2/21.-3/1  RRT initially called this AM for worsening tachypnea. Pt was evaluated at bedside breathing around 30s with belly breathing, hypoxic to 85% on 40% FIO2, increased to 100% with improvement in oxygen saturation to 97%. Cxray read as worsening pulm edema. ABG showed hypercapnia. Renal called for urgent HD. Also concern for aspiration PNA as increasing density of RLL.     During MICU admission patient treated with 5 days of vanc and zosyn, found to have MSSA bacteremia and MSSA in the sputum, now transitioned to cefazolin until 3/22 (post dialysis). Bacteremia has cleared since 2/22. Patient was extubated on 3/1 to nasal cannula, and was saturating well.    MICU stay #3- 3/12-hypoxic resp. failure--MICU--trach and peg 3/14-15 respectively--transfered back to [facility] 3/16 in pm  3/20: multiple episodes of vomiting reglan initiated  3/21- no further vomiting  3/22 - downsized to 6 portex uncuffed in anticipation of speaking valve today  3/23- PMV to bedside. As per  patient spend most of the time sleeping ? trial of central stimulator??  3/26- HD stable.   3/27- D/w renal will watch renal function tomorrow, if her number continue to improve likely discharge with no requirement for dialysis.   3/28- Renal fx remains stable d/c planning to rehab facility today 67F PMH HTN, DMT2 (70/30 TDD: 90 on admission), Breast Cancer (diagnosed in Feb 2017) currently on Herceptin (last dose Jan 20th), originally presented 1/30 with Fever found to have influenza subsequently went into PEA arrest x 2 with ROSC, complicated by bilateral pneumothoraces s/p 3 chest tubes and paracentesis decompression of the abdomen, then found to have PE s/p tPA 1/30, also s/p new CVA, and also new ESRD requiring almost daily HD.     First MICU admission:  Patient was admitted to MICU for further management. Patient was found to have acute renal failure and shock liver. Nephrology was consulted and patient was started on dialysis. Patient was started on Tamiflu for flu and zosyn for possible aspiration pneumonia. Patients chest tubes and peritoneal drain were removed. Patient redeveloped a pneumothorax on the L side and chest tube was reinserted. 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 Urine legionella was negative and azithromycin was discontinued. Patient continued to be anuric and continued to receive dialysis. Shock liver resolved. Patient was extubated on 2/8/17. Patient completed a 5 day course of tamiflu and 7 day course of zosyn and did not show signs of infection. Patients pneumothorax on L side resolved and chest tube was removed. Patients mental status improved and was alert and oriented x1-2 and spontaneously moves all extremities. Patient had an NG tube placed pending an official speech and swallow eval. Patient failed speech and swallow an a repeat speech and swallow was planned. Patient was then transferred to the floors for further management.     Second MICU admission on 2/21.-3/1  RRT initially called this AM for worsening tachypnea. Pt was evaluated at bedside breathing around 30s with belly breathing, hypoxic to 85% on 40% FIO2, increased to 100% with improvement in oxygen saturation to 97%. Cxray read as worsening pulm edema. ABG showed hypercapnia. Renal called for urgent HD. Also concern for aspiration PNA as increasing density of RLL.     During MICU admission patient treated with 5 days of vanc and zosyn, found to have MSSA bacteremia and MSSA in the sputum, now transitioned to cefazolin until 3/22 (post dialysis). Bacteremia has cleared since 2/22. Patient was extubated on 3/1 to nasal cannula, and was saturating well.    MICU stay #3- 3/12-hypoxic resp. failure--MICU--trach and peg 3/14-15 respectively--transfered back to RCU 3/16 in pm  3/20: multiple episodes of vomiting reglan initiated  3/21- no further vomiting  3/22 - downsized to 6 portex uncuffed in anticipation of speaking valve today  3/23- PMV to bedside. As per  patient spend most of the time sleeping ? trial of central stimulator??  3/26- HD stable.   3/27- D/w renal will watch renal function tomorrow, if her number continue to improve likely discharge with no requirement for dialysis.   3/28- Renal fx remains stable d/c planning to rehab facility today  3/30 Clonidine 0.1 mg peg bid added for HTN

## 2018-02-02 NOTE — DISCHARGE NOTE ADULT - MEDICATION SUMMARY - MEDICATIONS TO CHANGE
I will SWITCH the dose or number of times a day I take the medications listed below when I get home from the hospital:  None I will SWITCH the dose or number of times a day I take the medications listed below when I get home from the hospital:    aspirin 81 mg oral delayed release tablet  -- 1 tab(s) by mouth 3 to 4 times per week

## 2018-02-02 NOTE — PROGRESS NOTE ADULT - SUBJECTIVE AND OBJECTIVE BOX
SURGERY PROGRESS NOTE:    ===============================================  Acute Care Surgery and Trauma Surgery ([facility]) Pager [phone]  ===============================================    Subjective:  Intubated and sedated in MICU. Responds to commands. Pressor requirement decreasing.  at bedside updated.      Objective:    PE:  Gen: Critically ill  Resp: Intubated  CVS: RRR  Abd: Soft, Obese, ND, NT, no rebound or guarding  Ext: no edema, WWP, crepitus appreciated throughout extremities  Neuro: Sedated, but arousable    Vital Signs Last 24 Hrs  T(C): 37.6 (2018 04:00), Max: 38.4 (2018 00:30)  T(F): 99.7 (2018 04:00), Max: 101.2 (2018 00:30)  HR: 84 (2018 06:45) (70 - 104)  BP: 133/56 (2018 06:45) (76/33 - 197/74)  BP(mean): 81 (2018 06:45) (48 - 108)  RR: 100 (2018 20:05) (22 - 100)  SpO2: 100% (2018 06:45) (94% - 100%)    I&O's Detail    2018 07:01  -  2018 07:00  --------------------------------------------------------  IN:    dexmedetomidine Infusion: 92.4 mL    IV PiggyBack: 125 mL    norepinephrine Infusion: 426 mL    propofol Infusion: 125.1 mL  Total IN: 768.5 mL    OUT:    Indwelling Catheter - Urethral: 18 mL    Other: 500 mL  Total OUT: 518 mL    Total NET: 250.5 mL          Daily     Daily Weight in k.4 (2018 04:00)    MEDICATIONS  (STANDING):  ALBUTerol/ipratropium for Nebulization 3 milliLiter(s) Nebulizer every 6 hours  aspirin  chewable 81 milliGRAM(s) Oral daily  dexmedetomidine Infusion 0.107 MICROgram(s)/kG/Hr (2 mL/Hr) IV Continuous <Continuous>  dextrose 50% Injectable 25 Gram(s) IV Push once  heparin  Injectable 5000 Unit(s) SubCutaneous every 8 hours  insulin glargine Injectable (LANTUS) 15 Unit(s) SubCutaneous at bedtime  insulin lispro (HumaLOG) corrective regimen sliding scale   SubCutaneous every 6 hours  norepinephrine Infusion 0.01 MICROgram(s)/kG/Min (1.313 mL/Hr) IV Continuous <Continuous>  oseltamivir Suspension 30 milliGRAM(s) Oral every 12 hours  pantoprazole  Injectable 40 milliGRAM(s) IV Push every 12 hours  piperacillin/tazobactam IVPB. 3.375 Gram(s) IV Intermittent every 12 hours  polyethylene glycol 3350 17 Gram(s) Oral two times a day  propofol Infusion 10 MICROgram(s)/kG/Min (4.488 mL/Hr) IV Continuous <Continuous>  senna 2 Tablet(s) Oral at bedtime    MEDICATIONS  (PRN):  chlorhexidine 0.12% Liquid 15 milliLiter(s) Swish and Spit every 4 hours PRN mouth hygiene  midazolam Injectable 2 milliGRAM(s) IV Push every 2 hours PRN Agitation      LABS:                        8.7    6.2   )-----------( 131      ( 2018 04:42 )             24.3     02-    133<L>  |  95<L>  |  51<H>  ----------------------------<  191<H>  4.4   |  18<L>  |  3.59<H>    Ca    7.8<L>      2018 04:42  Phos  7.4     02-  Mg     2.3     -    TPro  6.0  /  Alb  2.3<L>  /  TBili  1.2  /  DBili  x   /  AST  5056<H>  /  ALT  3171<H>  /  AlkPhos  75  -    PT/INR - ( 2018 04:42 )   PT: 13.9 sec;   INR: 1.28 ratio         PTT - ( 2018 04:42 )  PTT:28.1 sec      RADIOLOGY & ADDITIONAL STUDIES:

## 2018-02-02 NOTE — PROGRESS NOTE ADULT - SUBJECTIVE AND OBJECTIVE BOX
[facility] DIVISION OF KIDNEY DISEASES AND HYPERTENSION -- FOLLOW UP NOTE  --------------------------------------------------------------------------------  Chief Complaint: RUSS s/p cardiac arrest    24 hour events/subjective:  s/p HD yesterday with 0.5 L UF, tolerated well.      PAST HISTORY  --------------------------------------------------------------------------------  No significant changes to PMH, PSH, FHx, SHx, unless otherwise noted    ALLERGIES & MEDICATIONS  --------------------------------------------------------------------------------  Allergies    NIFEdipine (Urticaria; Hives)  vitamin E (Short breath; Urticaria; Hives)    Intolerances      Standing Inpatient Medications  ALBUTerol/ipratropium for Nebulization 3 milliLiter(s) Nebulizer every 6 hours  aspirin  chewable 81 milliGRAM(s) Oral daily  dexmedetomidine Infusion 0.107 MICROgram(s)/kG/Hr IV Continuous <Continuous>  dextrose 50% Injectable 25 Gram(s) IV Push once  fentaNYL    Injectable 100 MICROGram(s) IV Push once  fentaNYL    Injectable 100 MICROGram(s) IV Push once  fentaNYL   Infusion 0.5 MICROgram(s)/kG/Hr IV Continuous <Continuous>  heparin  Injectable 5000 Unit(s) SubCutaneous every 8 hours  insulin glargine Injectable (LANTUS) 15 Unit(s) SubCutaneous at bedtime  insulin lispro (HumaLOG) corrective regimen sliding scale   SubCutaneous every 6 hours  methylPREDNISolone sodium succinate Injectable 40 milliGRAM(s) IV Push daily  oseltamivir Suspension 30 milliGRAM(s) Oral every 12 hours  pantoprazole  Injectable 40 milliGRAM(s) IV Push every 12 hours  piperacillin/tazobactam IVPB. 3.375 Gram(s) IV Intermittent every 12 hours  polyethylene glycol 3350 17 Gram(s) Oral two times a day  propofol Infusion 10 MICROgram(s)/kG/Min IV Continuous <Continuous>  senna 2 Tablet(s) Oral at bedtime    PRN Inpatient Medications  chlorhexidine 0.12% Liquid 15 milliLiter(s) Swish and Spit every 4 hours PRN  midazolam Injectable 2 milliGRAM(s) IV Push every 2 hours PRN      REVIEW OF SYSTEMS  --------------------------------------------------------------------------------  unable to obtain    VITALS/PHYSICAL EXAM  --------------------------------------------------------------------------------  T(C): 37.7 (02-02-18 @ 07:00), Max: 38.4 (02-02-18 @ 00:30)  HR: 66 (02-02-18 @ 11:40) (64 - 104)  BP: 149/64 (02-02-18 @ 10:15) (76/33 - 222/86)  RR: 24 (02-02-18 @ 10:15) (22 - 100)  SpO2: 100% (02-02-18 @ 11:40) (96% - 100%)  Wt(kg): --        02-01-18 @ 07:01  -  02-02-18 @ 07:00  --------------------------------------------------------  IN: 768.5 mL / OUT: 518 mL / NET: 250.5 mL      Physical Exam:  	Gen: intubated  	Pulm: vent sounds+  	CV: RRR, S1S2; no rub  	Abd: +BS, soft, nontender/nondistended  	UE: Warm, no edema  	LE: Warm, no edema  	Vascular access: right IJ non tunneled catheter+    LABS/STUDIES  --------------------------------------------------------------------------------              8.7    6.2   >-----------<  131      [02-02-18 @ 04:42]              24.3     133  |  95  |  51  ----------------------------<  191      [02-02-18 @ 04:42]  4.4   |  18  |  3.59        Ca     7.8     [02-02-18 @ 04:42]      Mg     2.3     [02-02-18 @ 04:42]      Phos  7.4     [02-02-18 @ 04:42]    TPro  6.0  /  Alb  2.3  /  TBili  1.2  /  DBili  x   /  AST  5056  /  ALT  3171  /  AlkPhos  75  [02-02-18 @ 04:42]    PT/INR: PT 13.9 , INR 1.28       [02-02-18 @ 04:42]  PTT: 28.1       [02-02-18 @ 04:42]    Troponin 0.07      [02-02-18 @ 04:42]        [02-02-18 @ 04:42]    Creatinine Trend:  SCr 3.59 [02-02 @ 04:42]  SCr 3.03 [02-01 @ 22:00]  SCr 4.04 [02-01 @ 12:44]  SCr 3.54 [02-01 @ 04:38]  SCr 3.15 [01-31 @ 21:45]    Urinalysis - [01-30-18 @ 12:52]      Color Yellow / Appearance SL Turbid / SG 1.025 / pH 6.0      Gluc 50 / Ketone Negative  / Bili Negative / Urobili Negative       Blood Trace / Protein 150 / Leuk Est Negative / Nitrite Negative      RBC 3-5 / WBC  / Hyaline  / Gran  / Sq Epi  / Non Sq Epi OCC / Bacteria       HbA1c 8.6      [01-31-18 @ 07:15]  Lipid: chol 149, , HDL 8, LDL Unable to calculate LDL Cholesterol --- Interpretive Comment (for adults 18 and over)  Optimal LDL Level may vary based on clinical situation  Below 70                  Ideal for people at very high risk of heart  disease  Below 100                Ideal for people at risk of heart disease  100 - 129                   [place]  130 - 159                   Borderline high  160 - 189                   High  190 and Above          Very high      [02-01-18 @ 15:34]    HBsAg Nonreact      [02-01-18 @ 22:49]  HCV 0.16, Nonreact      [02-01-18 @ 22:49]

## 2018-02-02 NOTE — DISCHARGE NOTE ADULT - PATIENT PORTAL LINK FT
“You can access the [employer] Patient Portal, offered by [facility], by registering with the following website: [email or web address]”

## 2018-02-02 NOTE — DISCHARGE NOTE ADULT - CARE PROVIDERS DIRECT ADDRESSES
[email or web address]

## 2018-02-02 NOTE — PROGRESS NOTE ADULT - ASSESSMENT
68 yo F with Breast cancer, HTN presented with the flu, went into respiratory arrest, then PEA arrest, s/p chest tube, TPA and intubation.   Patient now with shock liver, RUSS pneumoperitoneum, elevated cardiac enzymes

## 2018-02-02 NOTE — DISCHARGE NOTE ADULT - CARE PLAN
Principal Discharge DX:	Acute hypoxemic respiratory failure  Goal:	recovered  Assessment and plan of treatment:	Tolerating trach collar for several weeks. Patient and family were told that paient will need to develop strength and endurance prior to testing for swallow in the setting of 2 recently previously failed FEES.  Please periodically check to see when it is appropriate to test the patient. Uses PMV valve at will and has tolerated this.  Secondary Diagnosis:	CVA (cerebral vascular accident)  Goal:	L PCA infarct  Assessment and plan of treatment:	Continue with ASA. Continue on improving overall strength  Secondary Diagnosis:	Essential hypertension  Assessment and plan of treatment:	Labetalol recently increased for pressures above 160s -180s. Please continue to adjust if needed  Secondary Diagnosis:	Renal failure  Assessment and plan of treatment:	Initially requiring HD. last HD 3/23  and has not required any HD. Renal recovery has improved and patient is voiding. She does have a perma cath and it was determined that it should remain for approx another week and as long as renal recovery continues then the permacath should be removed. Renal team should be part of this decision as well  Secondary Diagnosis:	Type 2 diabetes mellitus with hyperglycemia, with long-term current use of insulin  Assessment and plan of treatment:	Please add sliding scale as per facility protocol to the current standing insulin doses Principal Discharge DX:	Acute hypoxemic respiratory failure  Goal:	recovered  Assessment and plan of treatment:	Tolerating trach collar for several weeks. Patient and family were told that patient will need to develop strength and endurance prior to testing for swallow in the setting of 2 recently previously failed FEES.  Please periodically check to see when it is appropriate to test the patient. Uses PMV valve at will and has tolerated this.  Secondary Diagnosis:	CVA (cerebral vascular accident)  Goal:	L PCA infarct  Assessment and plan of treatment:	Continue with ASA. Continue on improving overall strength  Secondary Diagnosis:	Essential hypertension  Assessment and plan of treatment:	Labetalol recently increased  and Clonidine 0.1 mg BID  added 3/30 for pressures above 160s -180s. Please continue to adjust if needed  Secondary Diagnosis:	Renal failure  Assessment and plan of treatment:	Initially requiring HD. last HD 3/23  and has not required any HD. Renal recovery has improved and patient is voiding. She does have a perma cath and it was determined that it should remain for approx another week and as long as renal recovery continues then the PermCath should be removed. Renal team should be part of this decision as well  BMP, mg, phosphorus wkly, next on 4/2  Secondary Diagnosis:	Type 2 diabetes mellitus with hyperglycemia, with long-term current use of insulin  Assessment and plan of treatment:	Please add sliding scale as per facility protocol to the current standing insulin doses Principal Discharge DX:	Acute hypoxemic respiratory failure  Goal:	recovered  Assessment and plan of treatment:	Tolerating trach collar for several weeks. Patient and family were told that patient will need to develop strength and endurance prior to testing for swallow in the setting of 2 recently previously failed FEES.  Please periodically check to see when it is appropriate to test the patient. Uses PMV valve at will and has tolerated this.  Secondary Diagnosis:	CVA (cerebral vascular accident)  Goal:	L PCA infarct  Assessment and plan of treatment:	Continue with ASA. Continue on improving overall strength  Secondary Diagnosis:	Essential hypertension  Assessment and plan of treatment:	Labetalol recently increased  and Clonidine 0.1 mg BID  added 3/30 for pressures above 160s -180s. Please continue to adjust if needed  Secondary Diagnosis:	Renal failure  Assessment and plan of treatment:	Initially requiring HD. last HD 3/23  and has not required any HD. Renal recovery has improved and patient is voiding. She does have a perma cath and it was determined that it should remain for approx another week and as long as renal recovery continues then the PermCath should be removed. Renal team should be part of this decision as well  BMP, mg, phosphorus wkly, next on 4/2  PL call [phone] to change PermCath dressing wkly, next on 4/2 or 4/3  Secondary Diagnosis:	Type 2 diabetes mellitus with hyperglycemia, with long-term current use of insulin  Assessment and plan of treatment:	Please add sliding scale as per facility protocol to the current standing insulin doses  Secondary Diagnosis:	Breast CA  Goal:	L SC Mediport: pl do dressing and flush per protocol

## 2018-02-02 NOTE — DISCHARGE NOTE ADULT - PLAN OF CARE
recovered Tolerating trach collar for several weeks. Patient and family were told that paient will need to develop strength and endurance prior to testing for swallow in the setting of 2 recently previously failed FEES.  Please periodically check to see when it is appropriate to test the patient. Uses PMV valve at will and has tolerated this. L PCA infarct Continue with ASA. Continue on improving overall strength Labetalol recently increased for pressures above 160s -180s. Please continue to adjust if needed Initially requiring HD. last HD 3/23  and has not required any HD. Renal recovery has improved and patient is voiding. She does have a perma cath and it was determined that it should remain for approx another week and as long as renal recovery continues then the permacath should be removed. Renal team should be part of this decision as well Please add sliding scale as per facility protocol to the current standing insulin doses Tolerating trach collar for several weeks. Patient and family were told that patient will need to develop strength and endurance prior to testing for swallow in the setting of 2 recently previously failed FEES.  Please periodically check to see when it is appropriate to test the patient. Uses PMV valve at will and has tolerated this. Labetalol recently increased  and Clonidine 0.1 mg BID  added 3/30 for pressures above 160s -180s. Please continue to adjust if needed Initially requiring HD. last HD 3/23  and has not required any HD. Renal recovery has improved and patient is voiding. She does have a perma cath and it was determined that it should remain for approx another week and as long as renal recovery continues then the PermCath should be removed. Renal team should be part of this decision as well  BMP, mg, phosphorus wkly, next on 4/2 Initially requiring HD. last HD 3/23  and has not required any HD. Renal recovery has improved and patient is voiding. She does have a perma cath and it was determined that it should remain for approx another week and as long as renal recovery continues then the PermCath should be removed. Renal team should be part of this decision as well  BMP, mg, phosphorus wkly, next on 4/2  PL call [phone] to change PermCath dressing wkly, next on 4/2 or 4/3 L SC Mediport: pl do dressing and flush per protocol

## 2018-02-02 NOTE — PROCEDURE NOTE - ADDITIONAL PROCEDURE DETAILS
Ultrasound performed to indentify area with no lung sliding. The patient positioned,  prepped and draped in a sterile manner using chlorhexidine scrub. A total of 15 cc of 1% lidocaine was used to anesthesize the area. A 2 cm incision was then made parallel to the rib in the midaxillary line at the level of the 6th rib. The subcutaneous tissue superficial and superior to the rib was dissected bluntly to the level of the pleura. The pleura was then entered bluntly.  Gush of air was noted from the pleural space. Parietal pleura was expanded bluntly and a finger was inserted. A 24F chest tube was inserted using my finger as a guide. The chest tube was sutured to the skin at the insertion site, and connected securely with tape to a pleurovac. A sterile occlusive dressing applied. No complications. Chest x-ray tube in appropriate position.

## 2018-02-02 NOTE — DISCHARGE NOTE ADULT - INSTRUCTIONS
nepro 40cc/h via gastrostomy tube  Free Water 250 Q6h via gastrostomy tube nepro 1.8 at 40cc/h x 24 hrs  via gastrostomy tube  Free Water 250 Q8h via gastrostomy tube

## 2018-02-02 NOTE — DISCHARGE NOTE ADULT - MEDICATION SUMMARY - MEDICATIONS TO STOP TAKING
I will STOP taking the medications listed below when I get home from the hospital:    furosemide 20 mg oral tablet  -- 1 tab(s) by mouth once a day    ramipril 10 mg oral capsule  -- 1 cap(s) by mouth 2 times a day    HumuLIN 70/30 KwikPen 70 units-30 units/mL subcutaneous suspension  --  50 units subcutaneous in the morning and 40 units subcutaneous in the evening    Magnesium Capsules  -- 1 cap(s) by mouth once a day    Calcium  -- 1 tab(s) by mouth once a day    carvedilol 12.5 mg oral tablet  -- 1 tab(s) by mouth once a day (at bedtime)    carvedilol 25 mg oral tablet  -- 1 tab(s) by mouth once a day (in the morning)    metFORMIN 500 mg oral tablet  -- 1 tab(s) by mouth 2 times a day    atorvastatin 40 mg oral tablet  -- 1 tab(s) by mouth once a day    letrozole 2.5 mg oral tablet  -- 1 tab(s) by mouth once a day

## 2018-02-02 NOTE — PROGRESS NOTE ADULT - SUBJECTIVE AND OBJECTIVE BOX
CHIEF COMPLAINT:  Influenza PNA, PEA arrest x 2, persistent left sided PTX (stable)     Interval Events: febrile to 101.3 overnight. +BM.     REVIEW OF SYSTEMS:  Constitutional: [ ] negative [ ] fevers [ ] chills [ ] weight loss [ ] weight gain  HEENT: [ ] negative [ ] dry eyes [ ] eye irritation [ ] postnasal drip [ ] nasal congestion  CV: [ ] negative  [ ] chest pain [ ] orthopnea [ ] palpitations [ ] murmur  Resp: [ ] negative [ ] cough [ ] shortness of breath [ ] dyspnea [ ] wheezing [ ] sputum [ ] hemoptysis  GI: [ ] negative [ ] nausea [ ] vomiting [ ] diarrhea [ ] constipation [ ] abd pain [ ] dysphagia   : [ ] negative [ ] dysuria [ ] nocturia [ ] hematuria [ ] increased urinary frequency  Musculoskeletal: [ ] negative [ ] back pain [ ] myalgias [ ] arthralgias [ ] fracture  Skin: [ ] negative [ ] rash [ ] itch  Neurological: [ ] negative [ ] headache [ ] dizziness [ ] syncope [ ] weakness [ ] numbness  Psychiatric: [ ] negative [ ] anxiety [ ] depression  Endocrine: [ ] negative [ ] diabetes [ ] thyroid problem  Hematologic/Lymphatic: [ ] negative [ ] anemia [ ] bleeding problem  Allergic/Immunologic: [ ] negative [ ] itchy eyes [ ] nasal discharge [ ] hives [ ] angioedema  [ ] All other systems negative  [x ] Unable to assess ROS because intubated and sedated.    OBJECTIVE:  ICU Vital Signs Last 24 Hrs  T(C): 37.6 (02 Feb 2018 04:00), Max: 38.4 (02 Feb 2018 00:30)  T(F): 99.7 (02 Feb 2018 04:00), Max: 101.2 (02 Feb 2018 00:30)  HR: 84 (02 Feb 2018 06:45) (70 - 104)  BP: 133/56 (02 Feb 2018 06:45) (76/33 - 197/74)  BP(mean): 81 (02 Feb 2018 06:45) (48 - 108)  ABP: --  ABP(mean): --  RR: 100 (01 Feb 2018 20:05) (22 - 100)  SpO2: 100% (02 Feb 2018 06:45) (94% - 100%)    Mode: AC/ CMV (Assist Control/ Continuous Mandatory Ventilation), RR (machine): 24, TV (machine): 400, FiO2: 30, PEEP: 5, ITime: 0.8, MAP: 14, PIP: 31    02-01 @ 07:01  -  02-02 @ 07:00  --------------------------------------------------------  IN: 768.5 mL / OUT: 518 mL / NET: 250.5 mL      CAPILLARY BLOOD GLUCOSE      POCT Blood Glucose.: 172 mg/dL (01 Feb 2018 23:33)      PHYSICAL EXAM:  General: NAD  Respiratory: intubated. Bandages over previous chest tube sites. Left sided medi port   Cardiovascular: RRR no MRG  Abdominal: Soft, distended. Bandage located on LLQ over area of previous abd drain.   Extremities: Warm  Skin: right IJ Shiley catheter  Neuro: PERRL however sluggish. Moves RUE minimally at wrist however does not move other extremities. Negative babinski bilaterally.     LINES:    HOSPITAL MEDICATIONS:  Standing Meds:  ALBUTerol/ipratropium for Nebulization 3 milliLiter(s) Nebulizer every 6 hours  aspirin  chewable 81 milliGRAM(s) Oral daily  dexmedetomidine Infusion 0.107 MICROgram(s)/kG/Hr IV Continuous <Continuous>  dextrose 50% Injectable 25 Gram(s) IV Push once  heparin  Injectable 5000 Unit(s) SubCutaneous every 8 hours  insulin glargine Injectable (LANTUS) 15 Unit(s) SubCutaneous at bedtime  insulin lispro (HumaLOG) corrective regimen sliding scale   SubCutaneous every 6 hours  norepinephrine Infusion 0.01 MICROgram(s)/kG/Min IV Continuous <Continuous>  oseltamivir Suspension 30 milliGRAM(s) Oral every 12 hours  pantoprazole  Injectable 40 milliGRAM(s) IV Push every 12 hours  piperacillin/tazobactam IVPB. 3.375 Gram(s) IV Intermittent every 12 hours  polyethylene glycol 3350 17 Gram(s) Oral two times a day  propofol Infusion 10 MICROgram(s)/kG/Min IV Continuous <Continuous>  senna 2 Tablet(s) Oral at bedtime      PRN Meds:  chlorhexidine 0.12% Liquid 15 milliLiter(s) Swish and Spit every 4 hours PRN  midazolam Injectable 2 milliGRAM(s) IV Push every 2 hours PRN      LABS:                        8.7    6.2   )-----------( 131      ( 02 Feb 2018 04:42 )             24.3     Hgb Trend: 8.7<--, 9.2<--, 9.3<--, 8.6<--, 8.6<--  02-02    133<L>  |  95<L>  |  51<H>  ----------------------------<  191<H>  4.4   |  18<L>  |  3.59<H>    Ca    7.8<L>      02 Feb 2018 04:42  Phos  7.4     02-02  Mg     2.3     02-02    TPro  6.0  /  Alb  2.3<L>  /  TBili  1.2  /  DBili  x   /  AST  5056<H>  /  ALT  3171<H>  /  AlkPhos  75  02-02    Creatinine Trend: 3.59<--, 3.03<--, 4.04<--, 3.54<--, 3.15<--, 2.71<--  PT/INR - ( 02 Feb 2018 04:42 )   PT: 13.9 sec;   INR: 1.28 ratio         PTT - ( 02 Feb 2018 04:42 )  PTT:28.1 sec    Arterial Blood Gas:  02-01 @ 12:39  7.20/47/78/17/93/-9.8  ABG lactate: --  Arterial Blood Gas:  02-01 @ 06:44  7.24/43/132/18/99/-8.4  ABG lactate: --    Venous Blood Gas:  02-01 @ 21:49  7.30/46/41/22/68  VBG Lactate: --  Venous Blood Gas:  02-01 @ 16:58  7.23/40/43/16/68  VBG Lactate: 1.6  Venous Blood Gas:  02-01 @ 04:27  7.26/44/47/19/70  VBG Lactate: 1.5  Venous Blood Gas:  01-31 @ 16:02  7.28/47/43/21/74  VBG Lactate: 1.3      MICROBIOLOGY:     Culture - Urine (collected 30 Jan 2018 16:16)  Source: .Urine Clean Catch (Midstream)  Final Report (31 Jan 2018 22:07):    <10,000 CFU/ml Normal Urogenital richa present    Culture - Blood (collected 30 Jan 2018 13:41)  Source: .Blood Blood-Venous  Preliminary Report (31 Jan 2018 14:01):    No growth to date.    Culture - Blood (collected 30 Jan 2018 13:41)  Source: .Blood Blood-Peripheral  Preliminary Report (31 Jan 2018 14:01):    No growth to date. CHIEF COMPLAINT:  Influenza PNA, PEA arrest x 2, persistent left sided PTX (stable)     Interval Events: febrile to 101.3 overnight. +BM. AM cxr showed large left PTX    REVIEW OF SYSTEMS:  Constitutional: [ ] negative [ ] fevers [ ] chills [ ] weight loss [ ] weight gain  HEENT: [ ] negative [ ] dry eyes [ ] eye irritation [ ] postnasal drip [ ] nasal congestion  CV: [ ] negative  [ ] chest pain [ ] orthopnea [ ] palpitations [ ] murmur  Resp: [ ] negative [ ] cough [ ] shortness of breath [ ] dyspnea [ ] wheezing [ ] sputum [ ] hemoptysis  GI: [ ] negative [ ] nausea [ ] vomiting [ ] diarrhea [ ] constipation [ ] abd pain [ ] dysphagia   : [ ] negative [ ] dysuria [ ] nocturia [ ] hematuria [ ] increased urinary frequency  Musculoskeletal: [ ] negative [ ] back pain [ ] myalgias [ ] arthralgias [ ] fracture  Skin: [ ] negative [ ] rash [ ] itch  Neurological: [ ] negative [ ] headache [ ] dizziness [ ] syncope [ ] weakness [ ] numbness  Psychiatric: [ ] negative [ ] anxiety [ ] depression  Endocrine: [ ] negative [ ] diabetes [ ] thyroid problem  Hematologic/Lymphatic: [ ] negative [ ] anemia [ ] bleeding problem  Allergic/Immunologic: [ ] negative [ ] itchy eyes [ ] nasal discharge [ ] hives [ ] angioedema  [ ] All other systems negative  [x ] Unable to assess ROS because intubated and sedated.    OBJECTIVE:  ICU Vital Signs Last 24 Hrs  T(C): 37.6 (02 Feb 2018 04:00), Max: 38.4 (02 Feb 2018 00:30)  T(F): 99.7 (02 Feb 2018 04:00), Max: 101.2 (02 Feb 2018 00:30)  HR: 84 (02 Feb 2018 06:45) (70 - 104)  BP: 133/56 (02 Feb 2018 06:45) (76/33 - 197/74)  BP(mean): 81 (02 Feb 2018 06:45) (48 - 108)  ABP: --  ABP(mean): --  RR: 100 (01 Feb 2018 20:05) (22 - 100)  SpO2: 100% (02 Feb 2018 06:45) (94% - 100%)    Mode: AC/ CMV (Assist Control/ Continuous Mandatory Ventilation), RR (machine): 24, TV (machine): 400, FiO2: 30, PEEP: 5, ITime: 0.8, MAP: 14, PIP: 31    02-01 @ 07:01  -  02-02 @ 07:00  --------------------------------------------------------  IN: 768.5 mL / OUT: 518 mL / NET: 250.5 mL      CAPILLARY BLOOD GLUCOSE      POCT Blood Glucose.: 172 mg/dL (01 Feb 2018 23:33)      PHYSICAL EXAM:  General: NAD  Respiratory: intubated. Bandages over previous chest tube sites. Left sided medi port   Cardiovascular: RRR no MRG  Abdominal: Soft, distended. Bandage located on LLQ over area of previous abd drain.   Extremities: Warm  Skin: right IJ Shiley catheter  Neuro: PERRL however sluggish. Moves RUE minimally at wrist however does not move other extremities. Negative babinski bilaterally.     LINES:    HOSPITAL MEDICATIONS:  Standing Meds:  ALBUTerol/ipratropium for Nebulization 3 milliLiter(s) Nebulizer every 6 hours  aspirin  chewable 81 milliGRAM(s) Oral daily  dexmedetomidine Infusion 0.107 MICROgram(s)/kG/Hr IV Continuous <Continuous>  dextrose 50% Injectable 25 Gram(s) IV Push once  heparin  Injectable 5000 Unit(s) SubCutaneous every 8 hours  insulin glargine Injectable (LANTUS) 15 Unit(s) SubCutaneous at bedtime  insulin lispro (HumaLOG) corrective regimen sliding scale   SubCutaneous every 6 hours  norepinephrine Infusion 0.01 MICROgram(s)/kG/Min IV Continuous <Continuous>  oseltamivir Suspension 30 milliGRAM(s) Oral every 12 hours  pantoprazole  Injectable 40 milliGRAM(s) IV Push every 12 hours  piperacillin/tazobactam IVPB. 3.375 Gram(s) IV Intermittent every 12 hours  polyethylene glycol 3350 17 Gram(s) Oral two times a day  propofol Infusion 10 MICROgram(s)/kG/Min IV Continuous <Continuous>  senna 2 Tablet(s) Oral at bedtime      PRN Meds:  chlorhexidine 0.12% Liquid 15 milliLiter(s) Swish and Spit every 4 hours PRN  midazolam Injectable 2 milliGRAM(s) IV Push every 2 hours PRN      LABS:                        8.7    6.2   )-----------( 131      ( 02 Feb 2018 04:42 )             24.3     Hgb Trend: 8.7<--, 9.2<--, 9.3<--, 8.6<--, 8.6<--  02-02    133<L>  |  95<L>  |  51<H>  ----------------------------<  191<H>  4.4   |  18<L>  |  3.59<H>    Ca    7.8<L>      02 Feb 2018 04:42  Phos  7.4     02-02  Mg     2.3     02-02    TPro  6.0  /  Alb  2.3<L>  /  TBili  1.2  /  DBili  x   /  AST  5056<H>  /  ALT  3171<H>  /  AlkPhos  75  02-02    Creatinine Trend: 3.59<--, 3.03<--, 4.04<--, 3.54<--, 3.15<--, 2.71<--  PT/INR - ( 02 Feb 2018 04:42 )   PT: 13.9 sec;   INR: 1.28 ratio         PTT - ( 02 Feb 2018 04:42 )  PTT:28.1 sec    Arterial Blood Gas:  02-01 @ 12:39  7.20/47/78/17/93/-9.8  ABG lactate: --  Arterial Blood Gas:  02-01 @ 06:44  7.24/43/132/18/99/-8.4  ABG lactate: --    Venous Blood Gas:  02-01 @ 21:49  7.30/46/41/22/68  VBG Lactate: --  Venous Blood Gas:  02-01 @ 16:58  7.23/40/43/16/68  VBG Lactate: 1.6  Venous Blood Gas:  02-01 @ 04:27  7.26/44/47/19/70  VBG Lactate: 1.5  Venous Blood Gas:  01-31 @ 16:02  7.28/47/43/21/74  VBG Lactate: 1.3      MICROBIOLOGY:     Culture - Urine (collected 30 Jan 2018 16:16)  Source: .Urine Clean Catch (Midstream)  Final Report (31 Jan 2018 22:07):    <10,000 CFU/ml Normal Urogenital richa present    Culture - Blood (collected 30 Jan 2018 13:41)  Source: .Blood Blood-Venous  Preliminary Report (31 Jan 2018 14:01):    No growth to date.    Culture - Blood (collected 30 Jan 2018 13:41)  Source: .Blood Blood-Peripheral  Preliminary Report (31 Jan 2018 14:01):    No growth to date.

## 2018-02-02 NOTE — PROGRESS NOTE ADULT - PROBLEM SELECTOR PLAN 1
likely from ATN in setting of cardiac arrest. Pt continues to be oligo anuric. s/p 1st session of HD yesterday with 0.5 L UF.  Consider risk/benefit analysis in event pt needs IV contrast. IV contrast exerts toxic effects on tubules immediately after exposure and hemodialysis post procedure might not be beneficial in preventing TONY.    Monitor BMP. likely from ATN in setting of cardiac arrest. Pt continues to be oligo anuric. s/p 1st session of HD yesterday with 0.5 L UF. No electrolyte abnormalities on labs.  Consider risk/benefit analysis in event pt needs IV contrast. IV contrast exerts toxic effects on tubules immediately after exposure and hemodialysis post procedure might not be beneficial in preventing OTNY.    Monitor BMP.

## 2018-02-02 NOTE — PROGRESS NOTE ADULT - ASSESSMENT
67yF w/ Pneumoperitoneum    - C/w current medical management  - Pneumoperitoneum appears to have resolved on most recent CXR  - No acute surgical intervention at this time  - Will continue to follow  - Please page [phone] w/ any questions    EUGENE Cole PGY-2 67yF w/ Pneumoperitoneum    - C/w current medical management  - Pneumoperitoneum appears to have resolved on most recent CXR  - Would consider repeat CT scan  - No acute surgical intervention at this time  - Will continue to follow  - Please page [phone] w/ any questions    EUGENE Cole PGY-2

## 2018-02-02 NOTE — PROGRESS NOTE ADULT - ATTENDING COMMENTS
67F w PMH of bilateral Breast CA (on Trastuzumab, last dose Jan 20th) admitted with malaise flu A + and then in ER  had sudden onset dyspnea with tachycardia and hypoxemia and PEA arrest - received TPA for presumed PE - intubation initially esophageal and then with subsequent tracheal intubation with normal oxygenation x 10 minutes then with recurrent PEA with subcutaneous emphysema, empiric left pigtail cath placed and then with subsequent rgt pneumothorax and distended abdomen. Also noted to have small L PCA nonhemorrhagic stroke and now with anuria and likely ATN. Currently with stable P:F ratio with low plateau pressures, responds to voice - overnight event includes worsening left pneumothorax without any hemodynamic compromise and now s/ p 28 french chest tube placement - minimal leak noted. Cardiac MRI with no evidence of dysfunction.    Shock resolved - was likely sec to sepsis. Minimal dose intermittent likely sec to sedation    CK improving (viral myositis suspected given delayed rise as opposed to cpr)    Cont question of whether had a massive PE as the cause of the sudden dyspnea and hypotension versus tension pneumothorax. Will hold off on anticoagulation and pursue ctpa once renal fxn improves. LE dopplers in the meantime   - cont empiric Abx with repeat cx given fever overnight  - > cont HD as needed    Needs esophageal eval for potential injury during intubation as cause of pneumoperitoneum but can wait until extubated to allow for full esoph visualization with gastrograffin    Pain mgmt for rib fxs and for chest tube    Attempt to ligthen sedation for breathing trial    Chest tube to low wall suction     Eventual need for mri for prognostication as needed depending on exam when extubated    Family aware (daughter and sister and )    cc time: 90 minutes

## 2018-02-02 NOTE — DISCHARGE NOTE ADULT - CARE PROVIDER_API CALL
Fran Nicole), Internal Medicine; Nephrology  [address]  [address] [place] [address]  Phone: [phone]  Fax: [phone]    Rad Hernandez (), Neurology; Vascular Neurology  [address] [facility] [address]  [place] [address]  Phone: [phone]  Fax: [phone]    Patria Weiner), EndocrinologyMetabDiabetes; Internal Medicine  [address] [facility]  [address]  [place] [address]  Phone: [phone]  Fax: [phone]    Waldo Stanley), Critical Care Medicine; Internal Medicine; Pulmonary Disease  [address] [place] [address]  Phone: [phone]  Fax: [phone]

## 2018-02-03 LAB
ALBUMIN SERPL ELPH-MCNC: 2.2 G/DL — LOW (ref 3.3–5)
ALBUMIN SERPL ELPH-MCNC: 2.4 G/DL — LOW (ref 3.3–5)
ALBUMIN SERPL ELPH-MCNC: 2.4 G/DL — LOW (ref 3.3–5)
ALP SERPL-CCNC: 69 U/L — SIGNIFICANT CHANGE UP (ref 40–120)
ALP SERPL-CCNC: 81 U/L — SIGNIFICANT CHANGE UP (ref 40–120)
ALP SERPL-CCNC: 82 U/L — SIGNIFICANT CHANGE UP (ref 40–120)
ALT FLD-CCNC: 1417 U/L RC — HIGH (ref 10–45)
ALT FLD-CCNC: 1859 U/L RC — HIGH (ref 10–45)
ALT FLD-CCNC: 2071 U/L RC — HIGH (ref 10–45)
ANION GAP SERPL CALC-SCNC: 18 MMOL/L — HIGH (ref 5–17)
ANION GAP SERPL CALC-SCNC: 19 MMOL/L — HIGH (ref 5–17)
ANION GAP SERPL CALC-SCNC: 19 MMOL/L — HIGH (ref 5–17)
APTT BLD: 26.6 SEC — LOW (ref 27.5–37.4)
APTT BLD: 27 SEC — LOW (ref 27.5–37.4)
AST SERPL-CCNC: 1195 U/L — HIGH (ref 10–40)
AST SERPL-CCNC: 400 U/L — HIGH (ref 10–40)
AST SERPL-CCNC: 908 U/L — HIGH (ref 10–40)
BILIRUB SERPL-MCNC: 2 MG/DL — HIGH (ref 0.2–1.2)
BILIRUB SERPL-MCNC: 2.5 MG/DL — HIGH (ref 0.2–1.2)
BILIRUB SERPL-MCNC: 3 MG/DL — HIGH (ref 0.2–1.2)
BUN SERPL-MCNC: 25 MG/DL — HIGH (ref 7–23)
BUN SERPL-MCNC: 34 MG/DL — HIGH (ref 7–23)
BUN SERPL-MCNC: 46 MG/DL — HIGH (ref 7–23)
CA-I BLDA-SCNC: 1.11 MMOL/L — LOW (ref 1.12–1.3)
CALCIUM SERPL-MCNC: 8.2 MG/DL — LOW (ref 8.4–10.5)
CALCIUM SERPL-MCNC: 8.7 MG/DL — SIGNIFICANT CHANGE UP (ref 8.4–10.5)
CALCIUM SERPL-MCNC: 8.7 MG/DL — SIGNIFICANT CHANGE UP (ref 8.4–10.5)
CHLORIDE SERPL-SCNC: 95 MMOL/L — LOW (ref 96–108)
CHLORIDE SERPL-SCNC: 97 MMOL/L — SIGNIFICANT CHANGE UP (ref 96–108)
CHLORIDE SERPL-SCNC: 97 MMOL/L — SIGNIFICANT CHANGE UP (ref 96–108)
CO2 SERPL-SCNC: 21 MMOL/L — LOW (ref 22–31)
CO2 SERPL-SCNC: 21 MMOL/L — LOW (ref 22–31)
CO2 SERPL-SCNC: 23 MMOL/L — SIGNIFICANT CHANGE UP (ref 22–31)
CREAT SERPL-MCNC: 1.86 MG/DL — HIGH (ref 0.5–1.3)
CREAT SERPL-MCNC: 2.92 MG/DL — HIGH (ref 0.5–1.3)
CREAT SERPL-MCNC: 3.44 MG/DL — HIGH (ref 0.5–1.3)
GLUCOSE BLDC GLUCOMTR-MCNC: 147 MG/DL — HIGH (ref 70–99)
GLUCOSE BLDC GLUCOMTR-MCNC: 213 MG/DL — HIGH (ref 70–99)
GLUCOSE BLDC GLUCOMTR-MCNC: 241 MG/DL — HIGH (ref 70–99)
GLUCOSE BLDC GLUCOMTR-MCNC: 253 MG/DL — HIGH (ref 70–99)
GLUCOSE BLDC GLUCOMTR-MCNC: 277 MG/DL — HIGH (ref 70–99)
GLUCOSE SERPL-MCNC: 125 MG/DL — HIGH (ref 70–99)
GLUCOSE SERPL-MCNC: 230 MG/DL — HIGH (ref 70–99)
GLUCOSE SERPL-MCNC: 236 MG/DL — HIGH (ref 70–99)
GRAM STN FLD: SIGNIFICANT CHANGE UP
HCT VFR BLD CALC: 22.9 % — LOW (ref 34.5–45)
HCT VFR BLD CALC: 26.4 % — LOW (ref 34.5–45)
HCT VFR BLD CALC: 26.6 % — LOW (ref 34.5–45)
HGB BLD-MCNC: 7.9 G/DL — LOW (ref 11.5–15.5)
HGB BLD-MCNC: 9.1 G/DL — LOW (ref 11.5–15.5)
HGB BLD-MCNC: 9.2 G/DL — LOW (ref 11.5–15.5)
INR BLD: 1.05 RATIO — SIGNIFICANT CHANGE UP (ref 0.88–1.16)
INR BLD: 1.08 RATIO — SIGNIFICANT CHANGE UP (ref 0.88–1.16)
MAGNESIUM SERPL-MCNC: 2 MG/DL — SIGNIFICANT CHANGE UP (ref 1.6–2.6)
MAGNESIUM SERPL-MCNC: 2 MG/DL — SIGNIFICANT CHANGE UP (ref 1.6–2.6)
MAGNESIUM SERPL-MCNC: 2.2 MG/DL — SIGNIFICANT CHANGE UP (ref 1.6–2.6)
MCHC RBC-ENTMCNC: 30.4 PG — SIGNIFICANT CHANGE UP (ref 27–34)
MCHC RBC-ENTMCNC: 30.5 PG — SIGNIFICANT CHANGE UP (ref 27–34)
MCHC RBC-ENTMCNC: 30.6 PG — SIGNIFICANT CHANGE UP (ref 27–34)
MCHC RBC-ENTMCNC: 34.4 GM/DL — SIGNIFICANT CHANGE UP (ref 32–36)
MCHC RBC-ENTMCNC: 34.5 GM/DL — SIGNIFICANT CHANGE UP (ref 32–36)
MCHC RBC-ENTMCNC: 34.7 GM/DL — SIGNIFICANT CHANGE UP (ref 32–36)
MCV RBC AUTO: 88 FL — SIGNIFICANT CHANGE UP (ref 80–100)
MCV RBC AUTO: 88.2 FL — SIGNIFICANT CHANGE UP (ref 80–100)
MCV RBC AUTO: 88.4 FL — SIGNIFICANT CHANGE UP (ref 80–100)
PHOSPHATE SERPL-MCNC: 4.3 MG/DL — SIGNIFICANT CHANGE UP (ref 2.5–4.5)
PHOSPHATE SERPL-MCNC: 4.5 MG/DL — SIGNIFICANT CHANGE UP (ref 2.5–4.5)
PHOSPHATE SERPL-MCNC: 6.1 MG/DL — HIGH (ref 2.5–4.5)
PLATELET # BLD AUTO: 116 K/UL — LOW (ref 150–400)
PLATELET # BLD AUTO: 149 K/UL — LOW (ref 150–400)
PLATELET # BLD AUTO: 170 K/UL — SIGNIFICANT CHANGE UP (ref 150–400)
POTASSIUM SERPL-MCNC: 3.3 MMOL/L — LOW (ref 3.5–5.3)
POTASSIUM SERPL-MCNC: 3.6 MMOL/L — SIGNIFICANT CHANGE UP (ref 3.5–5.3)
POTASSIUM SERPL-MCNC: 4.4 MMOL/L — SIGNIFICANT CHANGE UP (ref 3.5–5.3)
POTASSIUM SERPL-SCNC: 3.3 MMOL/L — LOW (ref 3.5–5.3)
POTASSIUM SERPL-SCNC: 3.6 MMOL/L — SIGNIFICANT CHANGE UP (ref 3.5–5.3)
POTASSIUM SERPL-SCNC: 4.4 MMOL/L — SIGNIFICANT CHANGE UP (ref 3.5–5.3)
PROT SERPL-MCNC: 5.7 G/DL — LOW (ref 6–8.3)
PROT SERPL-MCNC: 6.3 G/DL — SIGNIFICANT CHANGE UP (ref 6–8.3)
PROT SERPL-MCNC: 6.3 G/DL — SIGNIFICANT CHANGE UP (ref 6–8.3)
PROTHROM AB SERPL-ACNC: 11.3 SEC — SIGNIFICANT CHANGE UP (ref 9.8–12.7)
PROTHROM AB SERPL-ACNC: 11.7 SEC — SIGNIFICANT CHANGE UP (ref 9.8–12.7)
RBC # BLD: 2.59 M/UL — LOW (ref 3.8–5.2)
RBC # BLD: 2.99 M/UL — LOW (ref 3.8–5.2)
RBC # BLD: 3.01 M/UL — LOW (ref 3.8–5.2)
RBC # FLD: 12.1 % — SIGNIFICANT CHANGE UP (ref 10.3–14.5)
RBC # FLD: 12.1 % — SIGNIFICANT CHANGE UP (ref 10.3–14.5)
RBC # FLD: 12.3 % — SIGNIFICANT CHANGE UP (ref 10.3–14.5)
SODIUM SERPL-SCNC: 135 MMOL/L — SIGNIFICANT CHANGE UP (ref 135–145)
SODIUM SERPL-SCNC: 137 MMOL/L — SIGNIFICANT CHANGE UP (ref 135–145)
SODIUM SERPL-SCNC: 138 MMOL/L — SIGNIFICANT CHANGE UP (ref 135–145)
SPECIMEN SOURCE: SIGNIFICANT CHANGE UP
VANCOMYCIN FLD-MCNC: 11.4 UG/ML — SIGNIFICANT CHANGE UP
VANCOMYCIN FLD-MCNC: 7.3 UG/ML — SIGNIFICANT CHANGE UP
WBC # BLD: 4.1 K/UL — SIGNIFICANT CHANGE UP (ref 3.8–10.5)
WBC # BLD: 5.6 K/UL — SIGNIFICANT CHANGE UP (ref 3.8–10.5)
WBC # BLD: 6.7 K/UL — SIGNIFICANT CHANGE UP (ref 3.8–10.5)
WBC # FLD AUTO: 4.1 K/UL — SIGNIFICANT CHANGE UP (ref 3.8–10.5)
WBC # FLD AUTO: 5.6 K/UL — SIGNIFICANT CHANGE UP (ref 3.8–10.5)
WBC # FLD AUTO: 6.7 K/UL — SIGNIFICANT CHANGE UP (ref 3.8–10.5)

## 2018-02-03 PROCEDURE — 99291 CRITICAL CARE FIRST HOUR: CPT

## 2018-02-03 PROCEDURE — 71045 X-RAY EXAM CHEST 1 VIEW: CPT | Mod: 26,76

## 2018-02-03 PROCEDURE — 99232 SBSQ HOSP IP/OBS MODERATE 35: CPT

## 2018-02-03 PROCEDURE — 70450 CT HEAD/BRAIN W/O DYE: CPT | Mod: 26

## 2018-02-03 RX ORDER — VANCOMYCIN HCL 1 G
1000 VIAL (EA) INTRAVENOUS ONCE
Qty: 0 | Refills: 0 | Status: COMPLETED | OUTPATIENT
Start: 2018-02-03 | End: 2018-02-03

## 2018-02-03 RX ORDER — ASPIRIN/CALCIUM CARB/MAGNESIUM 324 MG
81 TABLET ORAL DAILY
Qty: 0 | Refills: 0 | Status: DISCONTINUED | OUTPATIENT
Start: 2018-02-03 | End: 2018-02-09

## 2018-02-03 RX ORDER — HYDRALAZINE HCL 50 MG
10 TABLET ORAL ONCE
Qty: 0 | Refills: 0 | Status: COMPLETED | OUTPATIENT
Start: 2018-02-03 | End: 2018-02-03

## 2018-02-03 RX ORDER — VANCOMYCIN HCL 1 G
1250 VIAL (EA) INTRAVENOUS ONCE
Qty: 0 | Refills: 0 | Status: DISCONTINUED | OUTPATIENT
Start: 2018-02-03 | End: 2018-02-03

## 2018-02-03 RX ORDER — POTASSIUM CHLORIDE 20 MEQ
20 PACKET (EA) ORAL ONCE
Qty: 0 | Refills: 0 | Status: COMPLETED | OUTPATIENT
Start: 2018-02-03 | End: 2018-02-03

## 2018-02-03 RX ORDER — HYDRALAZINE HCL 50 MG
5 TABLET ORAL EVERY 8 HOURS
Qty: 0 | Refills: 0 | Status: DISCONTINUED | OUTPATIENT
Start: 2018-02-03 | End: 2018-02-04

## 2018-02-03 RX ORDER — LABETALOL HCL 100 MG
100 TABLET ORAL EVERY 8 HOURS
Qty: 0 | Refills: 0 | Status: DISCONTINUED | OUTPATIENT
Start: 2018-02-03 | End: 2018-02-04

## 2018-02-03 RX ORDER — VANCOMYCIN HCL 1 G
1000 VIAL (EA) INTRAVENOUS ONCE
Qty: 0 | Refills: 0 | Status: DISCONTINUED | OUTPATIENT
Start: 2018-02-03 | End: 2018-02-03

## 2018-02-03 RX ORDER — FENTANYL CITRATE 50 UG/ML
25 INJECTION INTRAVENOUS ONCE
Qty: 0 | Refills: 0 | Status: DISCONTINUED | OUTPATIENT
Start: 2018-02-03 | End: 2018-02-03

## 2018-02-03 RX ORDER — HYDRALAZINE HCL 50 MG
5 TABLET ORAL ONCE
Qty: 0 | Refills: 0 | Status: COMPLETED | OUTPATIENT
Start: 2018-02-03 | End: 2018-02-03

## 2018-02-03 RX ADMIN — FENTANYL CITRATE 25 MICROGRAM(S): 50 INJECTION INTRAVENOUS at 07:20

## 2018-02-03 RX ADMIN — HEPARIN SODIUM 5000 UNIT(S): 5000 INJECTION INTRAVENOUS; SUBCUTANEOUS at 08:12

## 2018-02-03 RX ADMIN — Medication 40 MILLIGRAM(S): at 05:16

## 2018-02-03 RX ADMIN — Medication 6: at 23:19

## 2018-02-03 RX ADMIN — Medication 3 MILLILITER(S): at 17:15

## 2018-02-03 RX ADMIN — Medication 30 MILLIGRAM(S): at 05:15

## 2018-02-03 RX ADMIN — Medication 3 MILLILITER(S): at 23:34

## 2018-02-03 RX ADMIN — Medication 100 MILLIGRAM(S): at 21:29

## 2018-02-03 RX ADMIN — POLYETHYLENE GLYCOL 3350 17 GRAM(S): 17 POWDER, FOR SOLUTION ORAL at 05:16

## 2018-02-03 RX ADMIN — PANTOPRAZOLE SODIUM 40 MILLIGRAM(S): 20 TABLET, DELAYED RELEASE ORAL at 13:17

## 2018-02-03 RX ADMIN — Medication 3 MILLILITER(S): at 00:06

## 2018-02-03 RX ADMIN — Medication 6: at 11:37

## 2018-02-03 RX ADMIN — INSULIN GLARGINE 15 UNIT(S): 100 INJECTION, SOLUTION SUBCUTANEOUS at 22:03

## 2018-02-03 RX ADMIN — SENNA PLUS 2 TABLET(S): 8.6 TABLET ORAL at 21:29

## 2018-02-03 RX ADMIN — HEPARIN SODIUM 5000 UNIT(S): 5000 INJECTION INTRAVENOUS; SUBCUTANEOUS at 23:18

## 2018-02-03 RX ADMIN — Medication 100 MILLIGRAM(S): at 13:46

## 2018-02-03 RX ADMIN — Medication 81 MILLIGRAM(S): at 11:28

## 2018-02-03 RX ADMIN — Medication 3 MILLILITER(S): at 05:12

## 2018-02-03 RX ADMIN — PANTOPRAZOLE SODIUM 40 MILLIGRAM(S): 20 TABLET, DELAYED RELEASE ORAL at 23:18

## 2018-02-03 RX ADMIN — PIPERACILLIN AND TAZOBACTAM 25 GRAM(S): 4; .5 INJECTION, POWDER, LYOPHILIZED, FOR SOLUTION INTRAVENOUS at 09:19

## 2018-02-03 RX ADMIN — Medication 4: at 17:19

## 2018-02-03 RX ADMIN — PANTOPRAZOLE SODIUM 40 MILLIGRAM(S): 20 TABLET, DELAYED RELEASE ORAL at 01:48

## 2018-02-03 RX ADMIN — Medication 3 MILLILITER(S): at 12:09

## 2018-02-03 RX ADMIN — FENTANYL CITRATE 25 MICROGRAM(S): 50 INJECTION INTRAVENOUS at 06:25

## 2018-02-03 RX ADMIN — Medication 10 MILLIGRAM(S): at 09:20

## 2018-02-03 RX ADMIN — Medication 50 MILLIEQUIVALENT(S): at 03:50

## 2018-02-03 RX ADMIN — Medication 5 MILLIGRAM(S): at 13:17

## 2018-02-03 RX ADMIN — Medication 5 MILLIGRAM(S): at 17:55

## 2018-02-03 RX ADMIN — Medication 250 MILLIGRAM(S): at 23:18

## 2018-02-03 RX ADMIN — Medication 10 MILLIGRAM(S): at 06:19

## 2018-02-03 RX ADMIN — POLYETHYLENE GLYCOL 3350 17 GRAM(S): 17 POWDER, FOR SOLUTION ORAL at 17:04

## 2018-02-03 RX ADMIN — PIPERACILLIN AND TAZOBACTAM 25 GRAM(S): 4; .5 INJECTION, POWDER, LYOPHILIZED, FOR SOLUTION INTRAVENOUS at 21:29

## 2018-02-03 RX ADMIN — FENTANYL CITRATE 1.87 MICROGRAM(S)/KG/HR: 50 INJECTION INTRAVENOUS at 00:23

## 2018-02-03 RX ADMIN — HEPARIN SODIUM 5000 UNIT(S): 5000 INJECTION INTRAVENOUS; SUBCUTANEOUS at 15:51

## 2018-02-03 NOTE — PROGRESS NOTE ADULT - SUBJECTIVE AND OBJECTIVE BOX
[facility] DIVISION OF KIDNEY DISEASES AND HYPERTENSION -- FOLLOW UP NOTE  --------------------------------------------------------------------------------  Chief Complaint: RUSS on Dialysis    24 hour events/subjective: Tolerated HD last night.  cc        PAST HISTORY  --------------------------------------------------------------------------------  No significant changes to PMH, PSH, FHx, SHx, unless otherwise noted    ALLERGIES & MEDICATIONS  --------------------------------------------------------------------------------  Allergies    NIFEdipine (Urticaria; Hives)  vitamin E (Short breath; Urticaria; Hives)    Intolerances      Standing Inpatient Medications  ALBUTerol/ipratropium for Nebulization 3 milliLiter(s) Nebulizer every 6 hours  aspirin  chewable 81 milliGRAM(s) Oral daily  dexmedetomidine Infusion 0.107 MICROgram(s)/kG/Hr IV Continuous <Continuous>  dextrose 50% Injectable 25 Gram(s) IV Push once  fentaNYL   Infusion 0.5 MICROgram(s)/kG/Hr IV Continuous <Continuous>  heparin  Injectable 5000 Unit(s) SubCutaneous every 8 hours  insulin glargine Injectable (LANTUS) 15 Unit(s) SubCutaneous at bedtime  insulin lispro (HumaLOG) corrective regimen sliding scale   SubCutaneous every 6 hours  methylPREDNISolone sodium succinate Injectable 40 milliGRAM(s) IV Push daily  oseltamivir Suspension 30 milliGRAM(s) Oral every 12 hours  pantoprazole  Injectable 40 milliGRAM(s) IV Push every 12 hours  piperacillin/tazobactam IVPB. 3.375 Gram(s) IV Intermittent every 12 hours  polyethylene glycol 3350 17 Gram(s) Oral two times a day  propofol Infusion 10 MICROgram(s)/kG/Min IV Continuous <Continuous>  senna 2 Tablet(s) Oral at bedtime    PRN Inpatient Medications  chlorhexidine 0.12% Liquid 15 milliLiter(s) Swish and Spit every 4 hours PRN  midazolam Injectable 2 milliGRAM(s) IV Push every 2 hours PRN      REVIEW OF SYSTEMS  --------------------------------------------------------------------------------  intubated. Unable to obtain    VITALS/PHYSICAL EXAM  --------------------------------------------------------------------------------  T(C): 37 (02-03-18 @ 07:30), Max: 38.8 (02-03-18 @ 04:00)  HR: 81 (02-03-18 @ 09:15) (58 - 96)  BP: 170/73 (02-03-18 @ 09:15) (98/49 - 231/93)  RR: 24 (02-03-18 @ 09:00) (24 - 24)  SpO2: 95% (02-03-18 @ 09:15) (90% - 100%)  Wt(kg): --        02-02-18 @ 07:01  -  02-03-18 @ 07:00  --------------------------------------------------------  IN: 409.9 mL / OUT: 622 mL / NET: -212.1 mL    02-03-18 @ 07:01  -  02-03-18 @ 09:56  --------------------------------------------------------  IN: 27.4 mL / OUT: 0 mL / NET: 27.4 mL      Physical Exam:  	 Gen: intubated  	Pulm: vent sounds+  	CV: RRR, S1S2; no rub  	Abd: +BS, soft, nontender/nondistended  	UE: Warm, no edema  	LE: Warm, no edema  	Vascular access: right IJ non tunneled catheter+      LABS/STUDIES  --------------------------------------------------------------------------------              7.9    4.1   >-----------<  116      [02-03-18 @ 02:03]              22.9     137  |  97  |  34  ----------------------------<  125      [02-03-18 @ 02:03]  3.3   |  21  |  2.92        Ca     8.2     [02-03-18 @ 02:03]      Mg     2.0     [02-03-18 @ 02:03]      Phos  4.3     [02-03-18 @ 02:03]    TPro  5.7  /  Alb  2.2  /  TBili  2.0  /  DBili  x   /  AST  1195  /  ALT  2071  /  AlkPhos  69  [02-03-18 @ 02:03]    PT/INR: PT 12.2 , INR 1.12       [02-02-18 @ 17:31]  PTT: 25.4       [02-02-18 @ 17:31]    Troponin 0.05      [02-02-18 @ 17:31]        [02-02-18 @ 17:31]    Creatinine Trend:  SCr 2.92 [02-03 @ 02:03]  SCr 4.22 [02-02 @ 17:31]  SCr 3.59 [02-02 @ 04:42]  SCr 3.03 [02-01 @ 22:00]  SCr 4.04 [02-01 @ 12:44]    Urinalysis - [01-30-18 @ 12:52]      Color Yellow / Appearance SL Turbid / SG 1.025 / pH 6.0      Gluc 50 / Ketone Negative  / Bili Negative / Urobili Negative       Blood Trace / Protein 150 / Leuk Est Negative / Nitrite Negative      RBC 3-5 / WBC  / Hyaline  / Gran  / Sq Epi  / Non Sq Epi OCC / Bacteria       HbA1c 8.6      [01-31-18 @ 07:15]  Lipid: chol 149, , HDL 8, LDL Unable to calculate LDL Cholesterol --- Interpretive Comment (for adults 18 and over)  Optimal LDL Level may vary based on clinical situation  Below 70                  Ideal for people at very high risk of heart  disease  Below 100                Ideal for people at risk of heart disease  100 - 129                   [place]  130 - 159                   Borderline high  160 - 189                   High  190 and Above          Very high      [02-01-18 @ 15:34]    HBsAg Nonreact      [02-01-18 @ 22:49]  HCV 0.16, Nonreact      [02-01-18 @ 22:49]

## 2018-02-03 NOTE — PROGRESS NOTE ADULT - ASSESSMENT
67F w PMH of bilateral Breast CA (on Trastuzumab, last dose Jan 20th) admitted to MICU with influenza PNA c/b PEA arrest x 2 with ROSC x 2 (10 min + 30 min). CPR c/b bilateral PTX (s/p bilateral chest tubes), pneumoperitoneum (s/p Peritoneal drain (pigtail) in right hemiabdomen and paracentesis decompression) a/w extensive subcutaneous emphysema. Pt received 100 mg of empiric TPA in ED. Surgery consulted and not recommending surgical intervention.   All 3 chest tubes removed on 1/31. Pt had cardiac MRI on 2/1 which was wnl. Right sided IJ removed and right shiley placed and HD initiated on 2/1. Abdominal drain removed on 2/1. AM chest xray on 2/2 showed enlargement of previously noted left PTX. New Chest tube placed on left side. 	    Neurologic:  Alteration of mental status present. Likely due possible structural anoxic brain injury due to prolonged PEA arrest x2 + CTH after TPA showing evidence of new small acute left PCA territory infarct without hemorrhagic conversion. Pt to undergo sedation vacation today.  Repeat CTH after 24 hours showing no acute changes. Pt following simple commands minimally. Opens eyes but does not track.      Skin:  Lines: Right IJ Shiley  Decubiti: None    GI:  Diet: trickle feeds  Pt with persistent pneumoperitoneum seen on xray on 2/3. potentially secondary to pneumothorax communication. Imaging showing no evidence of viscous perforation.   Surgery and CT surgery not recommending any intervention.   GI stress prophylaxis indicated pantoprazole 40IV daily    Metabolic:  BMP showing evidence of RUSS 2/2 ATN 2/2 PEA arrest. Cr improving. HD yesterday. Also, hypokalemia (repleted)  Liver functions tests show ischemic hepatopathy with transaminitis 2/2 hypoxic insult 2/2 PEA arrest. LFTs trending down.   02-03    137  |  97  |  34<H>  ----------------------------<  125<H>  3.3<L>   |  21<L>  |  2.92<H>    Ca    8.2<L>      03 Feb 2018 02:03  Phos  4.3     02-03  Mg     2.0     02-03    LIVER FUNCTIONS - ( 03 Feb 2018 02:03 )  Alb: 2.2 g/dL / Pro: 5.7 g/dL / ALK PHOS: 69 U/L / ALT: 2071 U/L RC / AST: 1195 U/L / GGT: x           Patient with evidence of shock liver 2/2 to prolonged PEA arrest. ALT and AST both in the thousands.     CK now within normal limits    Volume Assessment:  No peripheral edema  No EVLW on US  No evidence of disturbance of effective circulating volume on US    Hematologic:  DVT prophylaxis with HSQ                        7.9    4.1   )-----------( 116      ( 03 Feb 2018 02:03 )             22.9   PT/INR - ( 02 Feb 2018 17:31 )   PT: 12.2 sec;   INR: 1.12 ratio         PTT - ( 02 Feb 2018 17:31 )  PTT:25.4 sec    Infectious Disease:  c/w oseltamivir day 4/5 for influenza  c/w pip tazo 4/7 for possible aspiration PNA vs micro perf    Hemodynamics:  Patient is not on pressors.    Cardiovascular:  GDE unable to perform due subcut emphysema and anterior chest wall bandages.  Cardiac MRI unremarkable. Shows normal RV and LV function.   Cardiac enzymes stable and trending down (.07) however pt with significant renal dysfunction      Respiratory:  Pt remains intubated on low FIO2 requirement with PEEP of 5. AM chest xray on 2/2 showed enlargement of previously noted chest xray. New left sided chest tube placed successfully and confirmed by repeat xray. Chest tube started on suction. Serial chest xrays. No tracheal deviation or hypotension.   Chest xray AM of 2/3 showed moderate left sided PTX which was improved from yesterday.

## 2018-02-03 NOTE — PROGRESS NOTE ADULT - ATTENDING COMMENTS
Pt seen and examined, agree with above. 67 F with extensive PMH including h/o Breast CA, HTN, DM 2, admitted with acute resp failure with hypoxia 2/2 Flu A infection and superimposed bacterial pneumonia, with course complicated by a cardiac arrest, b/l pneumothoraces, pneumoperitoneum, shock state requiring vasopressor support, an acute L PCA territory infarct and RUSS (likely 2/2 pre-renal ATN) requiring HD. Cont antibiotics and Tamiflu, FUP Cxs. Ct to LWS, FUP repeat CXR for PTX. Remains minimally responsive off sedation, check repeat head CT. HD per renal. Vasopressor support to keep MAP>65. Overall prognosis extremely guarded.  updated at bedside.   - Critical Care Time Spent: 45 mins

## 2018-02-04 DIAGNOSIS — E87.6 HYPOKALEMIA: ICD-10-CM

## 2018-02-04 LAB
ALBUMIN SERPL ELPH-MCNC: 2.3 G/DL — LOW (ref 3.3–5)
ALP SERPL-CCNC: 76 U/L — SIGNIFICANT CHANGE UP (ref 40–120)
ALT FLD-CCNC: 1182 U/L RC — HIGH (ref 10–45)
ANION GAP SERPL CALC-SCNC: 19 MMOL/L — HIGH (ref 5–17)
APTT BLD: 24.6 SEC — LOW (ref 27.5–37.4)
AST SERPL-CCNC: 215 U/L — HIGH (ref 10–40)
BILIRUB SERPL-MCNC: 2 MG/DL — HIGH (ref 0.2–1.2)
BUN SERPL-MCNC: 39 MG/DL — HIGH (ref 7–23)
CALCIUM SERPL-MCNC: 8.7 MG/DL — SIGNIFICANT CHANGE UP (ref 8.4–10.5)
CHLORIDE SERPL-SCNC: 95 MMOL/L — LOW (ref 96–108)
CO2 SERPL-SCNC: 23 MMOL/L — SIGNIFICANT CHANGE UP (ref 22–31)
CREAT SERPL-MCNC: 2.67 MG/DL — HIGH (ref 0.5–1.3)
CULTURE RESULTS: SIGNIFICANT CHANGE UP
GLUCOSE BLDC GLUCOMTR-MCNC: 344 MG/DL — HIGH (ref 70–99)
GLUCOSE BLDC GLUCOMTR-MCNC: 362 MG/DL — HIGH (ref 70–99)
GLUCOSE BLDC GLUCOMTR-MCNC: 379 MG/DL — HIGH (ref 70–99)
GLUCOSE BLDC GLUCOMTR-MCNC: 411 MG/DL — HIGH (ref 70–99)
GLUCOSE SERPL-MCNC: 349 MG/DL — HIGH (ref 70–99)
HCT VFR BLD CALC: 24.7 % — LOW (ref 34.5–45)
HGB BLD-MCNC: 8.7 G/DL — LOW (ref 11.5–15.5)
INR BLD: 1.07 RATIO — SIGNIFICANT CHANGE UP (ref 0.88–1.16)
MAGNESIUM SERPL-MCNC: 2.2 MG/DL — SIGNIFICANT CHANGE UP (ref 1.6–2.6)
MCHC RBC-ENTMCNC: 31.4 PG — SIGNIFICANT CHANGE UP (ref 27–34)
MCHC RBC-ENTMCNC: 35.4 GM/DL — SIGNIFICANT CHANGE UP (ref 32–36)
MCV RBC AUTO: 88.8 FL — SIGNIFICANT CHANGE UP (ref 80–100)
PHOSPHATE SERPL-MCNC: 4.5 MG/DL — SIGNIFICANT CHANGE UP (ref 2.5–4.5)
PLATELET # BLD AUTO: 159 K/UL — SIGNIFICANT CHANGE UP (ref 150–400)
POTASSIUM SERPL-MCNC: 3.3 MMOL/L — LOW (ref 3.5–5.3)
POTASSIUM SERPL-SCNC: 3.3 MMOL/L — LOW (ref 3.5–5.3)
PROT SERPL-MCNC: 6.1 G/DL — SIGNIFICANT CHANGE UP (ref 6–8.3)
PROTHROM AB SERPL-ACNC: 11.6 SEC — SIGNIFICANT CHANGE UP (ref 9.8–12.7)
RBC # BLD: 2.78 M/UL — LOW (ref 3.8–5.2)
RBC # FLD: 12.4 % — SIGNIFICANT CHANGE UP (ref 10.3–14.5)
SODIUM SERPL-SCNC: 137 MMOL/L — SIGNIFICANT CHANGE UP (ref 135–145)
SPECIMEN SOURCE: SIGNIFICANT CHANGE UP
WBC # BLD: 4.3 K/UL — SIGNIFICANT CHANGE UP (ref 3.8–10.5)
WBC # FLD AUTO: 4.3 K/UL — SIGNIFICANT CHANGE UP (ref 3.8–10.5)

## 2018-02-04 PROCEDURE — 99232 SBSQ HOSP IP/OBS MODERATE 35: CPT | Mod: GC

## 2018-02-04 PROCEDURE — 71045 X-RAY EXAM CHEST 1 VIEW: CPT | Mod: 26

## 2018-02-04 PROCEDURE — 99291 CRITICAL CARE FIRST HOUR: CPT

## 2018-02-04 RX ORDER — HYDRALAZINE HCL 50 MG
10 TABLET ORAL EVERY 6 HOURS
Qty: 0 | Refills: 0 | Status: DISCONTINUED | OUTPATIENT
Start: 2018-02-04 | End: 2018-02-06

## 2018-02-04 RX ORDER — HYDRALAZINE HCL 50 MG
10 TABLET ORAL ONCE
Qty: 0 | Refills: 0 | Status: COMPLETED | OUTPATIENT
Start: 2018-02-04 | End: 2018-02-04

## 2018-02-04 RX ORDER — HYDRALAZINE HCL 50 MG
25 TABLET ORAL EVERY 8 HOURS
Qty: 0 | Refills: 0 | Status: DISCONTINUED | OUTPATIENT
Start: 2018-02-04 | End: 2018-02-05

## 2018-02-04 RX ORDER — INSULIN GLARGINE 100 [IU]/ML
18 INJECTION, SOLUTION SUBCUTANEOUS AT BEDTIME
Qty: 0 | Refills: 0 | Status: DISCONTINUED | OUTPATIENT
Start: 2018-02-04 | End: 2018-02-05

## 2018-02-04 RX ORDER — HYDRALAZINE HCL 50 MG
25 TABLET ORAL EVERY 8 HOURS
Qty: 0 | Refills: 0 | Status: DISCONTINUED | OUTPATIENT
Start: 2018-02-04 | End: 2018-02-04

## 2018-02-04 RX ORDER — LABETALOL HCL 100 MG
200 TABLET ORAL EVERY 8 HOURS
Qty: 0 | Refills: 0 | Status: DISCONTINUED | OUTPATIENT
Start: 2018-02-04 | End: 2018-02-06

## 2018-02-04 RX ADMIN — PANTOPRAZOLE SODIUM 40 MILLIGRAM(S): 20 TABLET, DELAYED RELEASE ORAL at 12:18

## 2018-02-04 RX ADMIN — Medication 10: at 17:22

## 2018-02-04 RX ADMIN — INSULIN GLARGINE 18 UNIT(S): 100 INJECTION, SOLUTION SUBCUTANEOUS at 22:11

## 2018-02-04 RX ADMIN — Medication 10: at 23:29

## 2018-02-04 RX ADMIN — Medication 100 MILLIGRAM(S): at 05:28

## 2018-02-04 RX ADMIN — CHLORHEXIDINE GLUCONATE 15 MILLILITER(S): 213 SOLUTION TOPICAL at 12:18

## 2018-02-04 RX ADMIN — Medication 10 MILLIGRAM(S): at 09:15

## 2018-02-04 RX ADMIN — Medication 200 MILLIGRAM(S): at 18:03

## 2018-02-04 RX ADMIN — Medication 3 MILLILITER(S): at 11:03

## 2018-02-04 RX ADMIN — Medication 40 MILLIGRAM(S): at 05:28

## 2018-02-04 RX ADMIN — Medication 5 MILLIGRAM(S): at 08:05

## 2018-02-04 RX ADMIN — PIPERACILLIN AND TAZOBACTAM 25 GRAM(S): 4; .5 INJECTION, POWDER, LYOPHILIZED, FOR SOLUTION INTRAVENOUS at 21:14

## 2018-02-04 RX ADMIN — Medication 8: at 05:31

## 2018-02-04 RX ADMIN — Medication 3 MILLILITER(S): at 05:47

## 2018-02-04 RX ADMIN — Medication 200 MILLIGRAM(S): at 09:20

## 2018-02-04 RX ADMIN — HEPARIN SODIUM 5000 UNIT(S): 5000 INJECTION INTRAVENOUS; SUBCUTANEOUS at 16:10

## 2018-02-04 RX ADMIN — HEPARIN SODIUM 5000 UNIT(S): 5000 INJECTION INTRAVENOUS; SUBCUTANEOUS at 23:27

## 2018-02-04 RX ADMIN — Medication 10 MILLIGRAM(S): at 14:05

## 2018-02-04 RX ADMIN — HEPARIN SODIUM 5000 UNIT(S): 5000 INJECTION INTRAVENOUS; SUBCUTANEOUS at 08:09

## 2018-02-04 RX ADMIN — Medication 3 MILLILITER(S): at 17:36

## 2018-02-04 RX ADMIN — PANTOPRAZOLE SODIUM 40 MILLIGRAM(S): 20 TABLET, DELAYED RELEASE ORAL at 23:27

## 2018-02-04 RX ADMIN — Medication 25 MILLIGRAM(S): at 23:27

## 2018-02-04 RX ADMIN — Medication 12: at 12:41

## 2018-02-04 RX ADMIN — Medication 25 MILLIGRAM(S): at 16:19

## 2018-02-04 RX ADMIN — Medication 81 MILLIGRAM(S): at 12:19

## 2018-02-04 RX ADMIN — Medication 30 MILLIGRAM(S): at 06:05

## 2018-02-04 RX ADMIN — POLYETHYLENE GLYCOL 3350 17 GRAM(S): 17 POWDER, FOR SOLUTION ORAL at 05:28

## 2018-02-04 RX ADMIN — PIPERACILLIN AND TAZOBACTAM 25 GRAM(S): 4; .5 INJECTION, POWDER, LYOPHILIZED, FOR SOLUTION INTRAVENOUS at 08:09

## 2018-02-04 NOTE — PROGRESS NOTE ADULT - SUBJECTIVE AND OBJECTIVE BOX
CHIEF COMPLAINT: Influenza PNA c/b in hospital PEA arrest x 2 with possible anoxic brain injury, persistent left sided PTX (moderate in size), pneumoperitoneum    Interval Events: Had long discussion with family yesterday regarding overall prognosis. Discussed with  and daughter that there is a high likelihood that the patient suffered anoxic brain injury that may have resulted in permanent deficits given patient has not be observed to follow commands or to make purposeful movements when off sedation. Family remains hopeful that the patient will make a full recovery. I discussed the possibility of a trach and PEG with family and explained that she may only have minimal recovery of mental function and that it may take months for that to happen. Will discuss with family further. Patient remains full code.     HD yesterday.    REVIEW OF SYSTEMS:  Constitutional: [ ] negative [ ] fevers [ ] chills [ ] weight loss [ ] weight gain  HEENT: [ ] negative [ ] dry eyes [ ] eye irritation [ ] postnasal drip [ ] nasal congestion  CV: [ ] negative  [ ] chest pain [ ] orthopnea [ ] palpitations [ ] murmur  Resp: [ ] negative [ ] cough [ ] shortness of breath [ ] dyspnea [ ] wheezing [ ] sputum [ ] hemoptysis  GI: [ ] negative [ ] nausea [ ] vomiting [ ] diarrhea [ ] constipation [ ] abd pain [ ] dysphagia   : [ ] negative [ ] dysuria [ ] nocturia [ ] hematuria [ ] increased urinary frequency  Musculoskeletal: [ ] negative [ ] back pain [ ] myalgias [ ] arthralgias [ ] fracture  Skin: [ ] negative [ ] rash [ ] itch  Neurological: [ ] negative [ ] headache [ ] dizziness [ ] syncope [ ] weakness [ ] numbness  Psychiatric: [ ] negative [ ] anxiety [ ] depression  Endocrine: [ ] negative [ ] diabetes [ ] thyroid problem  Hematologic/Lymphatic: [ ] negative [ ] anemia [ ] bleeding problem  Allergic/Immunologic: [ ] negative [ ] itchy eyes [ ] nasal discharge [ ] hives [ ] angioedema  [ ] All other systems negative  [x ] Unable to assess ROS because intubated    OBJECTIVE:  ICU Vital Signs Last 24 Hrs  T(C): 36.8 (04 Feb 2018 04:00), Max: 37.2 (04 Feb 2018 00:00)  T(F): 98.3 (04 Feb 2018 04:00), Max: 98.9 (04 Feb 2018 00:00)  HR: 92 (04 Feb 2018 06:00) (79 - 99)  BP: 163/70 (04 Feb 2018 06:00) (95/47 - 225/95)  BP(mean): 100 (04 Feb 2018 06:00) (68 - 137)  ABP: --  ABP(mean): --  RR: 24 (04 Feb 2018 06:00) (18 - 24)  SpO2: 96% (04 Feb 2018 06:00) (94% - 100%)    Mode: AC/ CMV (Assist Control/ Continuous Mandatory Ventilation), RR (machine): 24, TV (machine): 400, FiO2: 30, PEEP: 5, ITime: 1, MAP: 11, PIP: 30    02-03 @ 07:01  -  02-04 @ 07:00  --------------------------------------------------------  IN: 1858.5 mL / OUT: 22 mL / NET: 1836.5 mL      CAPILLARY BLOOD GLUCOSE  147 (03 Feb 2018 06:00)      POCT Blood Glucose.: 344 mg/dL (04 Feb 2018 05:30)      PHYSICAL EXAM:  General: NAD  Respiratory: intubated. Bandages over previous chest tube sites. Left sided medi port. New left sided chest tube set to suction.   Cardiovascular: RRR no MRG  Abdominal: Soft, distended. Bandage located on LLQ over area of previous abd drain.   Extremities: Warm  Skin: right IJ Shiley catheter  Neuro: PERRL however sluggish. Upward gaze bilaterally.  Withdraws RUE minimally at wrist however does not move other extremities. Negative babinski bilaterally. Does not follow commands or track on exam. No purposeful movement.     LINES:    HOSPITAL MEDICATIONS:  Standing Meds:  ALBUTerol/ipratropium for Nebulization 3 milliLiter(s) Nebulizer every 6 hours  aspirin  chewable 81 milliGRAM(s) Oral daily  dextrose 50% Injectable 25 Gram(s) IV Push once  heparin  Injectable 5000 Unit(s) SubCutaneous every 8 hours  insulin glargine Injectable (LANTUS) 15 Unit(s) SubCutaneous at bedtime  insulin lispro (HumaLOG) corrective regimen sliding scale   SubCutaneous every 6 hours  labetalol 100 milliGRAM(s) Oral every 8 hours  methylPREDNISolone sodium succinate Injectable 40 milliGRAM(s) IV Push daily  oseltamivir Suspension 30 milliGRAM(s) Oral daily  pantoprazole  Injectable 40 milliGRAM(s) IV Push every 12 hours  piperacillin/tazobactam IVPB. 3.375 Gram(s) IV Intermittent every 12 hours  polyethylene glycol 3350 17 Gram(s) Oral two times a day  senna 2 Tablet(s) Oral at bedtime      PRN Meds:  chlorhexidine 0.12% Liquid 15 milliLiter(s) Swish and Spit every 4 hours PRN  hydrALAZINE Injectable 5 milliGRAM(s) IV Push every 8 hours PRN  midazolam Injectable 2 milliGRAM(s) IV Push every 2 hours PRN      LABS:                        9.1    5.6   )-----------( 170      ( 03 Feb 2018 21:45 )             26.4     Hgb Trend: 9.1<--, 9.2<--, 7.9<--, 8.3<--, 8.7<--  02-03    138  |  97  |  25<H>  ----------------------------<  236<H>  3.6   |  23  |  1.86<H>    Ca    8.7      03 Feb 2018 21:45  Phos  4.5     02-03  Mg     2.0     02-03    TPro  6.3  /  Alb  2.4<L>  /  TBili  2.5<H>  /  DBili  x   /  AST  400<H>  /  ALT  1417<H>  /  AlkPhos  81  02-03    Creatinine Trend: 1.86<--, 3.44<--, 2.92<--, 4.22<--, 3.59<--, 3.03<--  PT/INR - ( 04 Feb 2018 06:53 )   PT: 11.6 sec;   INR: 1.07 ratio         PTT - ( 04 Feb 2018 06:53 )  PTT:24.6 sec    Arterial Blood Gas:  02-02 @ 08:00  7.36/32/108/18/98/-6.6  ABG lactate: --        MICROBIOLOGY:     Culture - Sputum (collected 03 Feb 2018 15:06)  Source: .Sputum Sputum  Gram Stain (03 Feb 2018 23:07):    No polymorphonuclear leukocytes per low power field    No Squamous epithelial cells per low power field    Rare Gram Variable Rods per oil power field    Culture - Sputum (collected 02 Feb 2018 19:04)  Source: .Sputum Sputum  Gram Stain (02 Feb 2018 22:32):    Rare polymorphonuclear leukocytes per low power field    No squamous epithelial cells per low power field    No organisms seen  Preliminary Report (03 Feb 2018 16:33):    No growth to date.    Culture - Blood (collected 01 Feb 2018 22:46)  Source: .Blood Blood-Venous  Preliminary Report (02 Feb 2018 23:02):    No growth to date.    Culture - Blood (collected 01 Feb 2018 22:46)  Source: .Blood Blood-Peripheral  Preliminary Report (02 Feb 2018 23:02):    No growth to date.

## 2018-02-04 NOTE — PROGRESS NOTE ADULT - ATTENDING COMMENTS
Pt seen and examined, agree with above. 67 F with extensive PMH including h/o Breast CA, HTN, DM 2, admitted with acute resp failure with hypoxia 2/2 Flu A infection and superimposed bacterial pneumonia, with course complicated by a cardiac arrest, b/l pneumothoraces, pneumoperitoneum, shock state requiring vasopressor support, an acute L PCA territory infarct, shock liver and RUSS (likely 2/2 pre-renal ATN) requiring HD. Cont antibiotic coverage with Vanc/ Zosyn, last day of Tamiflu today. Cxs remain NGTD. L chest tube to LWS, still with residual PTX. Will check Ct chest. Now off sedation since yesterday, opens eyes, moves RUE and LLE, repeat head CT stable. Had HD yesterday. Remains hypertensive, cont BP control with Labetalol and hydralazine. Overall prognosis extremely guarded.  updated at bedside.   - Critical Care Time Spent: 40 mins

## 2018-02-04 NOTE — PROGRESS NOTE ADULT - SUBJECTIVE AND OBJECTIVE BOX
[facility] DIVISION OF KIDNEY DISEASES AND HYPERTENSION -- FOLLOW UP NOTE  --------------------------------------------------------------------------------  Glynnlaverne Johnson     --------------------------------------------------------------------------------  Chief Complaint:marcos on hd    24 hour events/subjective:  s/p hd 2/3/18      PAST HISTORY  --------------------------------------------------------------------------------  No significant changes to PMH, PSH, FHx, SHx, unless otherwise noted    ALLERGIES & MEDICATIONS  --------------------------------------------------------------------------------  Allergies    NIFEdipine (Urticaria; Hives)  vitamin E (Short breath; Urticaria; Hives)    Intolerances      Standing Inpatient Medications  ALBUTerol/ipratropium for Nebulization 3 milliLiter(s) Nebulizer every 6 hours  aspirin  chewable 81 milliGRAM(s) Oral daily  dextrose 50% Injectable 25 Gram(s) IV Push once  heparin  Injectable 5000 Unit(s) SubCutaneous every 8 hours  insulin glargine Injectable (LANTUS) 15 Unit(s) SubCutaneous at bedtime  insulin lispro (HumaLOG) corrective regimen sliding scale   SubCutaneous every 6 hours  labetalol 100 milliGRAM(s) Oral every 8 hours  methylPREDNISolone sodium succinate Injectable 40 milliGRAM(s) IV Push daily  oseltamivir Suspension 30 milliGRAM(s) Oral daily  pantoprazole  Injectable 40 milliGRAM(s) IV Push every 12 hours  piperacillin/tazobactam IVPB. 3.375 Gram(s) IV Intermittent every 12 hours  polyethylene glycol 3350 17 Gram(s) Oral two times a day  senna 2 Tablet(s) Oral at bedtime    PRN Inpatient Medications  chlorhexidine 0.12% Liquid 15 milliLiter(s) Swish and Spit every 4 hours PRN  hydrALAZINE Injectable 5 milliGRAM(s) IV Push every 8 hours PRN  midazolam Injectable 2 milliGRAM(s) IV Push every 2 hours PRN      REVIEW OF SYSTEMS  --------------------------------------------------------------------------------  Review Of Systems:  Constitutional: [ ] Fever [ ] Chills [ ] Fatigue [ ] Weight change   HEENT: [ ] Blurred vision [ ] Eye Pain [ ] Headache [ ] Runny nose [ ] Sore Throat   Respiratory: [ ] Cough [ ] Wheezing [ ] Shortness of breath  Cardiovascular: [ ] Chest Pain [ ] Palpitations [ ] FUNES [ ] PND [ ] Orthopnea  Gastrointestinal: [ ] Abdominal Pain [ ] Diarrhea [ ] Constipation [ ] Hemorrhoids [ ] Nausea [ ] Vomiting  Genitourinary: [ ] Nocturia [ ] Dysuria [ ] Incontinence  Extremities: [ ] Swelling [ ] Joint Pain  Neurologic: [ ] Focal deficit [ ] Paresthesias [ ] Syncope  Lymphatic: [ ] Swelling [ ] Lymphadenopathy   Skin: [ ] Rash [ ] Ecchymoses [ ] Wounds [ ] Lesions  Psychiatry: [ ] Depression [ ] Suicidal/Homicidal Ideation [ ] Anxiety [ ] Sleep Disturbances  [ ] 10 point review of systems is otherwise negative except as mentioned above       [x ]Unable to obtain    All other systems were reviewed and are negative, except as noted.    VITALS/PHYSICAL EXAM  --------------------------------------------------------------------------------  T(C): 36.8 (18 @ 04:00), Max: 37.2 (18 @ 00:00)  HR: 79 (18 @ 08:00) (79 - 99)  BP: 146/63 (18 @ 07:00) (95/47 - 213/84)  RR: 24 (18 @ 06:00) (18 - 24)  SpO2: 97% (18 @ 08:00) (95% - 100%)  Wt(kg): --      Daily     Daily Weight in k.6 (2018 04:00)  I&O's Summary    2018 07:01  -  2018 07:00  --------------------------------------------------------  IN: 1858.5 mL / OUT: 22 mL / NET: 1836.5 mL          18 @ 07:01  -  18 @ 07:00  --------------------------------------------------------  IN: 1858.5 mL / OUT: 22 mL / NET: 1836.5 mL        Physical Exam:              Gen: NAD   	HEENT: anicteric  	Pulm: decrease bs b/l FiO2: 30, PEEP: 5  	CV: RRR  	Back: trace dependent edema  	Abd: soft, nontender, nondistended  	: No willard  	LE: Warm, no edema  	Neuro: no asterixis  	Skin: Warm, without rashes  	Vascular access: right IJ non tunneled catheter+    LABS/STUDIES  --------------------------------------------------------------------------------              8.7    4.3   >-----------<  159      [18 06:53]              24.7     137  |  95  |  39  ----------------------------<  349      [18 06:53]  3.3   |  23  |  2.67        Ca     8.7     [18 06:53]      Mg     2.2     [18 06:53]      Phos  4.5     [18 06:53]    TPro  6.1  /  Alb  2.3  /  TBili  2.0  /  DBili  x   /  AST  215  /  ALT  1182  /  AlkPhos  76  [18 06:53]    PT/INR: PT 11.6 , INR 1.07       [18 06:53]  PTT: 24.6       [18 06:53]    Troponin 0.05      [18 17:31]        [18 17:31]    Creatinine Trend:  SCr 2.67 [:53]  SCr 1.86 [ 21:45]  SCr 3.44 [ 09:34]  SCr 2.92 [ 02:03]  SCr 4.22 [ 17:31]    Urinalysis - [18 @ 12:52]      Color Yellow / Appearance SL Turbid / SG 1.025 / pH 6.0      Gluc 50 / Ketone Negative  / Bili Negative / Urobili Negative       Blood Trace / Protein 150 / Leuk Est Negative / Nitrite Negative      RBC 3-5 / WBC  / Hyaline  / Gran  / Sq Epi  / Non Sq Epi OCC / Bacteria       HbA1c 8.6      [18 @ 07:15]  Lipid: chol 149, , HDL 8, LDL Unable to calculate LDL Cholesterol --- Interpretive Comment (for adults 18 and over)  Optimal LDL Level may vary based on clinical situation  Below 70                  Ideal for people at very high risk of heart  disease  Below 100                Ideal for people at risk of heart disease  100 - 129                   [place]  130 - 159                   Borderline high  160 - 189                   High  190 and Above          Very high      [18 @ 15:34]    HBsAg Nonreact      [18 @ 22:49]  HCV 0.16, Nonreact      [18 @ 22:49]        Radiology  --------------------------------------------------------------------------------    --------------------------------------------------------------------------------  Glynn Johnson

## 2018-02-04 NOTE — PROGRESS NOTE ADULT - PROBLEM SELECTOR PLAN 1
likely from ATN in setting of cardiac arrest. Pt continues to be oligo anuric  s/p 3rd hd session.   No acute indication for HD today. will restart HD monday.   bladder scan w/o urine   Monitor for renal recovery  Change Tamiflu to QD ( dose after Dialysis) likely from ATN in setting of cardiac arrest. Pt continues to be oligo anuric  s/p 3rd hd session.     Currently with no e/o renal recovery with no urine output  No acute indication for HD today   will reassess for  HD tomorrow   bladder scan w/o urine   Monitor for renal recovery  Change Tamiflu to QD ( dose after Dialysis)

## 2018-02-04 NOTE — PROGRESS NOTE ADULT - ASSESSMENT
67F w PMH of bilateral Breast CA (on Trastuzumab, last dose Jan 20th) admitted to MICU with influenza PNA c/b PEA arrest x 2 with ROSC x 2 (10 min + 30 min). CPR c/b bilateral PTX (s/p bilateral chest tubes), pneumoperitoneum (s/p Peritoneal drain (pigtail) in right hemiabdomen and paracentesis decompression) a/w extensive subcutaneous emphysema. Pt received 100 mg of empiric TPA in ED. Surgery consulted and not recommending surgical intervention.   All 3 chest tubes removed on 1/31. Pt had cardiac MRI on 2/1 which was wnl. Right sided IJ removed and right shiley placed and HD initiated on 2/1. Abdominal drain removed on 2/1. AM chest xray on 2/2 showed enlargement of previously noted left PTX. New Chest tube placed on left side. 	    Neurologic:  Alteration of mental status present. Likely due possible structural anoxic brain injury due to prolonged PEA arrest x2 + CTH after TPA showing evidence of new small acute left PCA territory infarct without hemorrhagic conversion. Pt to undergo sedation vacation today.  Repeat CTH after 24 hours and on 2/3 showing no acute changes. Neurologic exam as above.   Will pursue MRI head if pts condition improves    Skin:  Lines: Right IJ Shiley  Decubiti: None    GI:  Diet: trickle feeds  Pt with persistent pneumoperitoneum seen on xray on 2/3. potentially secondary to pneumothorax communication. Imaging showing no evidence of viscous perforation.   Surgery and CT surgery not recommending any intervention.   GI stress prophylaxis indicated pantoprazole 40IV daily    Metabolic:  BMP showing evidence of RUSS 2/2 ATN 2/2 PEA arrest. Cr improving. HD yesterday.   Liver functions tests show ischemic hepatopathy with transaminitis 2/2 hypoxic insult 2/2 PEA arrest. LFTs trending down.   02-03    138  |  97  |  25<H>  ----------------------------<  236<H>  3.6   |  23  |  1.86<H>    Ca    8.7      03 Feb 2018 21:45  Phos  4.5     02-03  Mg     2.0     02-03    LIVER FUNCTIONS - ( 03 Feb 2018 21:45 )  Alb: 2.4 g/dL / Pro: 6.3 g/dL / ALK PHOS: 81 U/L / ALT: 1417 U/L RC / AST: 400 U/L / GGT: x           Patient with evidence of shock liver 2/2 to prolonged PEA arrest. ALT and AST were both in the thousands, now trending down     CK now within normal limits    Volume Assessment:  No peripheral edema  No EVLW on US  No evidence of disturbance of effective circulating volume on US    Hematologic:  DVT prophylaxis with HSQ                        8.7    4.3   )-----------( 159      ( 04 Feb 2018 06:53 )             24.7   PT/INR - ( 04 Feb 2018 06:53 )   PT: 11.6 sec;   INR: 1.07 ratio         PTT - ( 04 Feb 2018 06:53 )  PTT:24.6 sec                     Infectious Disease:  c/w oseltamivir day 5/5 for influenza  c/w pip tazo 5/7 for possible aspiration PNA vs micro perf    Hemodynamics:  Patient is not on pressors.    Cardiovascular:  GDE unable to perform due subcut emphysema and anterior chest wall bandages.  Cardiac MRI unremarkable. Shows normal RV and LV function.       Respiratory:  Pt remains intubated on low FIO2 requirement with PEEP of 5. AM chest xray on 2/2 showed enlargement of previously noted chest xray. New left sided chest tube placed successfully and confirmed by repeat xray. Chest tube started on suction. Serial chest xrays. No tracheal deviation or hypotension.   Chest xray AM of 2/3 showed moderate left sided PTX which was improved from yesterday. Repeat CTX on night of 2/3 showed stable moderate left sided PTX.

## 2018-02-05 LAB
ALBUMIN SERPL ELPH-MCNC: 2.4 G/DL — LOW (ref 3.3–5)
ALP SERPL-CCNC: 81 U/L — SIGNIFICANT CHANGE UP (ref 40–120)
ALT FLD-CCNC: 807 U/L RC — HIGH (ref 10–45)
ANION GAP SERPL CALC-SCNC: 19 MMOL/L — HIGH (ref 5–17)
APTT BLD: 25.2 SEC — LOW (ref 27.5–37.4)
AST SERPL-CCNC: 81 U/L — HIGH (ref 10–40)
BILIRUB SERPL-MCNC: 1.5 MG/DL — HIGH (ref 0.2–1.2)
BUN SERPL-MCNC: 61 MG/DL — HIGH (ref 7–23)
CALCIUM SERPL-MCNC: 8.7 MG/DL — SIGNIFICANT CHANGE UP (ref 8.4–10.5)
CHLORIDE SERPL-SCNC: 96 MMOL/L — SIGNIFICANT CHANGE UP (ref 96–108)
CO2 SERPL-SCNC: 22 MMOL/L — SIGNIFICANT CHANGE UP (ref 22–31)
CREAT SERPL-MCNC: 3.73 MG/DL — HIGH (ref 0.5–1.3)
CULTURE RESULTS: SIGNIFICANT CHANGE UP
GLUCOSE BLDC GLUCOMTR-MCNC: 203 MG/DL — HIGH (ref 70–99)
GLUCOSE BLDC GLUCOMTR-MCNC: 280 MG/DL — HIGH (ref 70–99)
GLUCOSE BLDC GLUCOMTR-MCNC: 347 MG/DL — HIGH (ref 70–99)
GLUCOSE BLDC GLUCOMTR-MCNC: 355 MG/DL — HIGH (ref 70–99)
GLUCOSE SERPL-MCNC: 404 MG/DL — HIGH (ref 70–99)
HCT VFR BLD CALC: 25.2 % — LOW (ref 34.5–45)
HGB BLD-MCNC: 8.4 G/DL — LOW (ref 11.5–15.5)
INR BLD: 1.04 RATIO — SIGNIFICANT CHANGE UP (ref 0.88–1.16)
MAGNESIUM SERPL-MCNC: 2.5 MG/DL — SIGNIFICANT CHANGE UP (ref 1.6–2.6)
MCHC RBC-ENTMCNC: 29.3 PG — SIGNIFICANT CHANGE UP (ref 27–34)
MCHC RBC-ENTMCNC: 33.3 GM/DL — SIGNIFICANT CHANGE UP (ref 32–36)
MCV RBC AUTO: 88 FL — SIGNIFICANT CHANGE UP (ref 80–100)
NON-GYNECOLOGICAL CYTOLOGY STUDY: SIGNIFICANT CHANGE UP
PHOSPHATE SERPL-MCNC: 4.4 MG/DL — SIGNIFICANT CHANGE UP (ref 2.5–4.5)
PLATELET # BLD AUTO: 164 K/UL — SIGNIFICANT CHANGE UP (ref 150–400)
POTASSIUM SERPL-MCNC: 3.2 MMOL/L — LOW (ref 3.5–5.3)
POTASSIUM SERPL-SCNC: 3.2 MMOL/L — LOW (ref 3.5–5.3)
PROT SERPL-MCNC: 6.1 G/DL — SIGNIFICANT CHANGE UP (ref 6–8.3)
PROTHROM AB SERPL-ACNC: 11.4 SEC — SIGNIFICANT CHANGE UP (ref 9.8–12.7)
RBC # BLD: 2.86 M/UL — LOW (ref 3.8–5.2)
RBC # FLD: 12.3 % — SIGNIFICANT CHANGE UP (ref 10.3–14.5)
SODIUM SERPL-SCNC: 137 MMOL/L — SIGNIFICANT CHANGE UP (ref 135–145)
SPECIMEN SOURCE: SIGNIFICANT CHANGE UP
WBC # BLD: 4.5 K/UL — SIGNIFICANT CHANGE UP (ref 3.8–10.5)
WBC # FLD AUTO: 4.5 K/UL — SIGNIFICANT CHANGE UP (ref 3.8–10.5)

## 2018-02-05 PROCEDURE — 90935 HEMODIALYSIS ONE EVALUATION: CPT | Mod: GC

## 2018-02-05 PROCEDURE — 99291 CRITICAL CARE FIRST HOUR: CPT

## 2018-02-05 PROCEDURE — 71045 X-RAY EXAM CHEST 1 VIEW: CPT | Mod: 26

## 2018-02-05 PROCEDURE — 71045 X-RAY EXAM CHEST 1 VIEW: CPT | Mod: 26,77

## 2018-02-05 RX ORDER — HUMAN INSULIN 100 [IU]/ML
15 INJECTION, SUSPENSION SUBCUTANEOUS EVERY 8 HOURS
Qty: 0 | Refills: 0 | Status: DISCONTINUED | OUTPATIENT
Start: 2018-02-05 | End: 2018-02-05

## 2018-02-05 RX ORDER — INSULIN LISPRO 100/ML
VIAL (ML) SUBCUTANEOUS EVERY 6 HOURS
Qty: 0 | Refills: 0 | Status: DISCONTINUED | OUTPATIENT
Start: 2018-02-05 | End: 2018-02-06

## 2018-02-05 RX ORDER — HUMAN INSULIN 100 [IU]/ML
11 INJECTION, SUSPENSION SUBCUTANEOUS EVERY 6 HOURS
Qty: 0 | Refills: 0 | Status: DISCONTINUED | OUTPATIENT
Start: 2018-02-05 | End: 2018-02-06

## 2018-02-05 RX ORDER — INSULIN LISPRO 100/ML
VIAL (ML) SUBCUTANEOUS EVERY 4 HOURS
Qty: 0 | Refills: 0 | Status: DISCONTINUED | OUTPATIENT
Start: 2018-02-05 | End: 2018-02-05

## 2018-02-05 RX ORDER — NOREPINEPHRINE BITARTRATE/D5W 8 MG/250ML
0.1 PLASTIC BAG, INJECTION (ML) INTRAVENOUS
Qty: 8 | Refills: 0 | Status: DISCONTINUED | OUTPATIENT
Start: 2018-02-05 | End: 2018-02-08

## 2018-02-05 RX ORDER — HYDRALAZINE HCL 50 MG
50 TABLET ORAL EVERY 8 HOURS
Qty: 0 | Refills: 0 | Status: DISCONTINUED | OUTPATIENT
Start: 2018-02-05 | End: 2018-02-06

## 2018-02-05 RX ADMIN — Medication 12: at 10:22

## 2018-02-05 RX ADMIN — PANTOPRAZOLE SODIUM 40 MILLIGRAM(S): 20 TABLET, DELAYED RELEASE ORAL at 23:41

## 2018-02-05 RX ADMIN — HUMAN INSULIN 11 UNIT(S): 100 INJECTION, SUSPENSION SUBCUTANEOUS at 17:02

## 2018-02-05 RX ADMIN — Medication 81 MILLIGRAM(S): at 13:49

## 2018-02-05 RX ADMIN — Medication 3 MILLILITER(S): at 00:06

## 2018-02-05 RX ADMIN — Medication 3 MILLILITER(S): at 11:57

## 2018-02-05 RX ADMIN — PANTOPRAZOLE SODIUM 40 MILLIGRAM(S): 20 TABLET, DELAYED RELEASE ORAL at 13:49

## 2018-02-05 RX ADMIN — HEPARIN SODIUM 5000 UNIT(S): 5000 INJECTION INTRAVENOUS; SUBCUTANEOUS at 08:00

## 2018-02-05 RX ADMIN — PIPERACILLIN AND TAZOBACTAM 25 GRAM(S): 4; .5 INJECTION, POWDER, LYOPHILIZED, FOR SOLUTION INTRAVENOUS at 22:28

## 2018-02-05 RX ADMIN — HEPARIN SODIUM 5000 UNIT(S): 5000 INJECTION INTRAVENOUS; SUBCUTANEOUS at 23:41

## 2018-02-05 RX ADMIN — Medication 16: at 05:13

## 2018-02-05 RX ADMIN — Medication 200 MILLIGRAM(S): at 17:03

## 2018-02-05 RX ADMIN — Medication 1 MILLIGRAM(S): at 22:37

## 2018-02-05 RX ADMIN — Medication 14.03 MICROGRAM(S)/KG/MIN: at 17:43

## 2018-02-05 RX ADMIN — Medication 25 MILLIGRAM(S): at 08:00

## 2018-02-05 RX ADMIN — Medication 50 MILLIGRAM(S): at 22:28

## 2018-02-05 RX ADMIN — Medication 40 MILLIGRAM(S): at 05:10

## 2018-02-05 RX ADMIN — HEPARIN SODIUM 5000 UNIT(S): 5000 INJECTION INTRAVENOUS; SUBCUTANEOUS at 17:02

## 2018-02-05 RX ADMIN — Medication 2: at 17:01

## 2018-02-05 RX ADMIN — SENNA PLUS 2 TABLET(S): 8.6 TABLET ORAL at 22:27

## 2018-02-05 RX ADMIN — Medication 200 MILLIGRAM(S): at 02:32

## 2018-02-05 RX ADMIN — HUMAN INSULIN 11 UNIT(S): 100 INJECTION, SUSPENSION SUBCUTANEOUS at 23:47

## 2018-02-05 RX ADMIN — Medication 3 MILLILITER(S): at 05:27

## 2018-02-05 RX ADMIN — Medication 200 MILLIGRAM(S): at 10:22

## 2018-02-05 RX ADMIN — Medication 3 MILLILITER(S): at 23:19

## 2018-02-05 RX ADMIN — Medication 5: at 23:47

## 2018-02-05 RX ADMIN — PIPERACILLIN AND TAZOBACTAM 25 GRAM(S): 4; .5 INJECTION, POWDER, LYOPHILIZED, FOR SOLUTION INTRAVENOUS at 08:42

## 2018-02-05 RX ADMIN — Medication 3 MILLILITER(S): at 17:14

## 2018-02-05 NOTE — PROGRESS NOTE ADULT - SUBJECTIVE AND OBJECTIVE BOX
[facility] DIVISION OF KIDNEY DISEASES AND HYPERTENSION -- FOLLOW UP NOTE  --------------------------------------------------------------------------------  Chief Complaint:    24 hour events/subjective:        PAST HISTORY  --------------------------------------------------------------------------------  No significant changes to PMH, PSH, FHx, SHx, unless otherwise noted    ALLERGIES & MEDICATIONS  --------------------------------------------------------------------------------  Allergies    NIFEdipine (Urticaria; Hives)  vitamin E (Short breath; Urticaria; Hives)    Intolerances      Standing Inpatient Medications  ALBUTerol/ipratropium for Nebulization 3 milliLiter(s) Nebulizer every 6 hours  aspirin  chewable 81 milliGRAM(s) Oral daily  heparin  Injectable 5000 Unit(s) SubCutaneous every 8 hours  hydrALAZINE 25 milliGRAM(s) Oral every 8 hours  insulin glargine Injectable (LANTUS) 18 Unit(s) SubCutaneous at bedtime  insulin lispro (HumaLOG) corrective regimen sliding scale   SubCutaneous every 4 hours  labetalol 200 milliGRAM(s) Oral every 8 hours  oseltamivir Suspension 30 milliGRAM(s) Oral daily  pantoprazole  Injectable 40 milliGRAM(s) IV Push every 12 hours  piperacillin/tazobactam IVPB. 3.375 Gram(s) IV Intermittent every 12 hours  polyethylene glycol 3350 17 Gram(s) Oral two times a day  senna 2 Tablet(s) Oral at bedtime    PRN Inpatient Medications  chlorhexidine 0.12% Liquid 15 milliLiter(s) Swish and Spit every 4 hours PRN  hydrALAZINE Injectable 10 milliGRAM(s) IV Push every 6 hours PRN  midazolam Injectable 2 milliGRAM(s) IV Push every 2 hours PRN      REVIEW OF SYSTEMS  --------------------------------------------------------------------------------  Gen: No weight changes, fatigue, fevers/chills, weakness  Skin: No rashes  Head/Eyes/Ears/Mouth: No headache; Normal hearing; Normal vision w/o blurriness; No sinus pain/discomfort, sore throat  Respiratory: No dyspnea, cough, wheezing, hemoptysis  CV: No chest pain, PND, orthopnea  GI: No abdominal pain, diarrhea, constipation, nausea, vomiting, melena, hematochezia  : No increased frequency, dysuria, hematuria, nocturia  MSK: No joint pain/swelling; no back pain; no edema  Neuro: No dizziness/lightheadedness, weakness, seizures, numbness, tingling  Heme: No easy bruising or bleeding  Endo: No heat/cold intolerance  Psych: No significant nervousness, anxiety, stress, depression    All other systems were reviewed and are negative, except as noted.    VITALS/PHYSICAL EXAM  --------------------------------------------------------------------------------  T(C): 36.7 (02-05-18 @ 04:00), Max: 37.8 (02-04-18 @ 12:00)  HR: 67 (02-05-18 @ 08:04) (66 - 101)  BP: 165/70 (02-05-18 @ 07:00) (105/52 - 200/74)  RR: 24 (02-05-18 @ 07:00) (24 - 26)  SpO2: 97% (02-05-18 @ 08:04) (95% - 99%)  Wt(kg): --        02-04-18 @ 07:01  -  02-05-18 @ 07:00  --------------------------------------------------------  IN: 920 mL / OUT: 30 mL / NET: 890 mL      Physical Exam:  	Gen: NAD, well-appearing  	HEENT: PERRL, supple neck, clear oropharynx  	Pulm: CTA B/L  	CV: RRR, S1S2; no rub  	Back: No spinal or CVA tenderness; no sacral edema  	Abd: +BS, soft, nontender/nondistended  	: No suprapubic tenderness  	UE: Warm, FROM, no clubbing, intact strength; no edema; no asterixis  	LE: Warm, FROM, no clubbing, intact strength; no edema  	Neuro: No focal deficits, intact gait  	Psych: Normal affect and mood  	Skin: Warm, without rashes  	Vascular access:    LABS/STUDIES  --------------------------------------------------------------------------------              8.4    4.5   >-----------<  164      [02-04-18 @ 23:53]              25.2     137  |  96  |  61  ----------------------------<  404      [02-04-18 @ 23:53]  3.2   |  22  |  3.73        Ca     8.7     [02-04-18 @ 23:53]      Mg     2.5     [02-04-18 @ 23:53]      Phos  4.4     [02-04-18 @ 23:53]    TPro  6.1  /  Alb  2.4  /  TBili  1.5  /  DBili  x   /  AST  81  /  ALT  807  /  AlkPhos  81  [02-04-18 @ 23:53]    PT/INR: PT 11.4 , INR 1.04       [02-04-18 @ 23:53]  PTT: 25.2       [02-04-18 @ 23:53]      Creatinine Trend:  SCr 3.73 [02-04 @ 23:53]  SCr 2.67 [02-04 @ 06:53]  SCr 1.86 [02-03 @ 21:45]  SCr 3.44 [02-03 @ 09:34]  SCr 2.92 [02-03 @ 02:03]    Urinalysis - [01-30-18 @ 12:52]      Color Yellow / Appearance SL Turbid / SG 1.025 / pH 6.0      Gluc 50 / Ketone Negative  / Bili Negative / Urobili Negative       Blood Trace / Protein 150 / Leuk Est Negative / Nitrite Negative      RBC 3-5 / WBC  / Hyaline  / Gran  / Sq Epi  / Non Sq Epi OCC / Bacteria       HbA1c 8.6      [01-31-18 @ 07:15]  Lipid: chol 149, , HDL 8, LDL Unable to calculate LDL Cholesterol --- Interpretive Comment (for adults 18 and over)  Optimal LDL Level may vary based on clinical situation  Below 70                  Ideal for people at very high risk of heart  disease  Below 100                Ideal for people at risk of heart disease  100 - 129                   [place]  130 - 159                   Borderline high  160 - 189                   High  190 and Above          Very high      [02-01-18 @ 15:34]    HBsAg Nonreact      [02-01-18 @ 22:49]  HCV 0.16, Nonreact      [02-01-18 @ 22:49] [facility] DIVISION OF KIDNEY DISEASES AND HYPERTENSION -- FOLLOW UP NOTE  --------------------------------------------------------------------------------  Chief Complaint: Cardiac arrest, RUSS    24 hour events/subjective:  continues to be anuric.        PAST HISTORY  --------------------------------------------------------------------------------  No significant changes to PMH, PSH, FHx, SHx, unless otherwise noted    ALLERGIES & MEDICATIONS  --------------------------------------------------------------------------------  Allergies    NIFEdipine (Urticaria; Hives)  vitamin E (Short breath; Urticaria; Hives)    Intolerances      Standing Inpatient Medications  ALBUTerol/ipratropium for Nebulization 3 milliLiter(s) Nebulizer every 6 hours  aspirin  chewable 81 milliGRAM(s) Oral daily  heparin  Injectable 5000 Unit(s) SubCutaneous every 8 hours  hydrALAZINE 25 milliGRAM(s) Oral every 8 hours  insulin glargine Injectable (LANTUS) 18 Unit(s) SubCutaneous at bedtime  insulin lispro (HumaLOG) corrective regimen sliding scale   SubCutaneous every 4 hours  labetalol 200 milliGRAM(s) Oral every 8 hours  oseltamivir Suspension 30 milliGRAM(s) Oral daily  pantoprazole  Injectable 40 milliGRAM(s) IV Push every 12 hours  piperacillin/tazobactam IVPB. 3.375 Gram(s) IV Intermittent every 12 hours  polyethylene glycol 3350 17 Gram(s) Oral two times a day  senna 2 Tablet(s) Oral at bedtime    PRN Inpatient Medications  chlorhexidine 0.12% Liquid 15 milliLiter(s) Swish and Spit every 4 hours PRN  hydrALAZINE Injectable 10 milliGRAM(s) IV Push every 6 hours PRN  midazolam Injectable 2 milliGRAM(s) IV Push every 2 hours PRN      REVIEW OF SYSTEMS  --------------------------------------------------------------------------------  unable to obtain    VITALS/PHYSICAL EXAM  --------------------------------------------------------------------------------  T(C): 36.7 (02-05-18 @ 04:00), Max: 37.8 (02-04-18 @ 12:00)  HR: 67 (02-05-18 @ 08:04) (66 - 101)  BP: 165/70 (02-05-18 @ 07:00) (105/52 - 200/74)  RR: 24 (02-05-18 @ 07:00) (24 - 26)  SpO2: 97% (02-05-18 @ 08:04) (95% - 99%)  Wt(kg): --        02-04-18 @ 07:01  -  02-05-18 @ 07:00  --------------------------------------------------------  IN: 920 mL / OUT: 30 mL / NET: 890 mL      Physical Exam:  	Gen: intubated  	Pulm: transmitted vent sounds  	CV: RRR, s1/s2  	Abd: +BS, soft, nontender  	: No suprapubic tenderness  	UE: Warm, ; no edema  	LE: Warm, ; no edema  	Skin: Warm  	Vascular access: RIJ non tunneled catheter, site clean/dry/intact    LABS/STUDIES  --------------------------------------------------------------------------------              8.4    4.5   >-----------<  164      [02-04-18 @ 23:53]              25.2     137  |  96  |  61  ----------------------------<  404      [02-04-18 @ 23:53]  3.2   |  22  |  3.73        Ca     8.7     [02-04-18 @ 23:53]      Mg     2.5     [02-04-18 @ 23:53]      Phos  4.4     [02-04-18 @ 23:53]    TPro  6.1  /  Alb  2.4  /  TBili  1.5  /  DBili  x   /  AST  81  /  ALT  807  /  AlkPhos  81  [02-04-18 @ 23:53]    PT/INR: PT 11.4 , INR 1.04       [02-04-18 @ 23:53]  PTT: 25.2       [02-04-18 @ 23:53]      Creatinine Trend:  SCr 3.73 [02-04 @ 23:53]  SCr 2.67 [02-04 @ 06:53]  SCr 1.86 [02-03 @ 21:45]  SCr 3.44 [02-03 @ 09:34]  SCr 2.92 [02-03 @ 02:03]    Urinalysis - [01-30-18 @ 12:52]      Color Yellow / Appearance SL Turbid / SG 1.025 / pH 6.0      Gluc 50 / Ketone Negative  / Bili Negative / Urobili Negative       Blood Trace / Protein 150 / Leuk Est Negative / Nitrite Negative      RBC 3-5 / WBC  / Hyaline  / Gran  / Sq Epi  / Non Sq Epi OCC / Bacteria       HbA1c 8.6      [01-31-18 @ 07:15]  Lipid: chol 149, , HDL 8, LDL Unable to calculate LDL Cholesterol --- Interpretive Comment (for adults 18 and over)  Optimal LDL Level may vary based on clinical situation  Below 70                  Ideal for people at very high risk of heart  disease  Below 100                Ideal for people at risk of heart disease  100 - 129                   [place]  130 - 159                   Borderline high  160 - 189                   High  190 and Above          Very high      [02-01-18 @ 15:34]    HBsAg Nonreact      [02-01-18 @ 22:49]  HCV 0.16, Nonreact      [02-01-18 @ 22:49]

## 2018-02-05 NOTE — PROGRESS NOTE ADULT - ATTENDING COMMENTS
66 yo F with Breast cancer, HTN presented with the flu, went into respiratory arrest, then PEA arrest, s/p chest tube, TPA and intubation.   Patient now with shock liver, RUSS pneumoperitoneum, elevated cardiac enzymes  Anuric ATH  Schemic  HD right now  BP stable  Access working well

## 2018-02-05 NOTE — PROGRESS NOTE ADULT - SUBJECTIVE AND OBJECTIVE BOX
CHIEF COMPLAINT: Influenza PNA c/b in hospital PEA arrest x 2 with possible anoxic brain injury, persistent left sided PTX (moderate in size), pneumoperitoneum    Interval Events: No acute events ON. Patient still anuric.     REVIEW OF SYSTEMS:  Constitutional: [ ] negative [ ] fevers [ ] chills [ ] weight loss [ ] weight gain  HEENT: [ ] negative [ ] dry eyes [ ] eye irritation [ ] postnasal drip [ ] nasal congestion  CV: [ ] negative  [ ] chest pain [ ] orthopnea [ ] palpitations [ ] murmur  Resp: [ ] negative [ ] cough [ ] shortness of breath [ ] dyspnea [ ] wheezing [ ] sputum [ ] hemoptysis  GI: [ ] negative [ ] nausea [ ] vomiting [ ] diarrhea [ ] constipation [ ] abd pain [ ] dysphagia   : [ ] negative [ ] dysuria [ ] nocturia [ ] hematuria [ ] increased urinary frequency  Musculoskeletal: [ ] negative [ ] back pain [ ] myalgias [ ] arthralgias [ ] fracture  Skin: [ ] negative [ ] rash [ ] itch  Neurological: [ ] negative [ ] headache [ ] dizziness [ ] syncope [ ] weakness [ ] numbness  Psychiatric: [ ] negative [ ] anxiety [ ] depression  Endocrine: [ ] negative [ ] diabetes [ ] thyroid problem  Hematologic/Lymphatic: [ ] negative [ ] anemia [ ] bleeding problem  Allergic/Immunologic: [ ] negative [ ] itchy eyes [ ] nasal discharge [ ] hives [ ] angioedema  [ ] All other systems negative  [x ] Unable to assess ROS because patient is minimally responsive.     OBJECTIVE:  ICU Vital Signs Last 24 Hrs  T(C): 36.6 (05 Feb 2018 12:33), Max: 37.5 (04 Feb 2018 22:00)  T(F): 97.9 (05 Feb 2018 12:33), Max: 99.5 (04 Feb 2018 22:00)  HR: 65 (05 Feb 2018 12:33) (63 - 85)  BP: 175/63 (05 Feb 2018 12:33) (115/54 - 200/74)  BP(mean): 97 (05 Feb 2018 12:33) (78 - 117)  ABP: --  ABP(mean): --  RR: 18 (05 Feb 2018 12:33) (18 - 26)  SpO2: 98% (05 Feb 2018 12:33) (96% - 99%)    Mode: CPAP with PS, FiO2: 30, PEEP: 5, PS: 15, MAP: 9, PIP: 20    02-04 @ 07:01 - 02-05 @ 07:00  --------------------------------------------------------  IN: 920 mL / OUT: 30 mL / NET: 890 mL    02-05 @ 07:01 - 02-05 @ 13:34  --------------------------------------------------------  IN: 170 mL / OUT: 0 mL / NET: 170 mL      CAPILLARY BLOOD GLUCOSE  347 (05 Feb 2018 05:00)      POCT Blood Glucose.: 280 mg/dL (05 Feb 2018 10:20)      PHYSICAL EXAM:  General: NAD, intubated  Head: AT/NC   Respiratory: clear to ascultation anteriorly   Cardiovascular: (+ S1/S2, RRR   Abdomen: soft- non-tender  Extremities: no edema, clubbing, cyanosis   Skin: R shiley catheter  Neurological: opens eyes, nonresponsive    Psychiatry: alert and orientedx0     LINES:    HOSPITAL MEDICATIONS:  aspirin  chewable 81 milliGRAM(s) Oral daily  heparin  Injectable 5000 Unit(s) SubCutaneous every 8 hours    piperacillin/tazobactam IVPB. 3.375 Gram(s) IV Intermittent every 12 hours    hydrALAZINE 50 milliGRAM(s) Oral every 8 hours  hydrALAZINE Injectable 10 milliGRAM(s) IV Push every 6 hours PRN  labetalol 200 milliGRAM(s) Oral every 8 hours    insulin lispro (HumaLOG) corrective regimen sliding scale   SubCutaneous every 4 hours  insulin NPH human recombinant 15 Unit(s) SubCutaneous every 8 hours    ALBUTerol/ipratropium for Nebulization 3 milliLiter(s) Nebulizer every 6 hours    midazolam Injectable 2 milliGRAM(s) IV Push every 2 hours PRN    pantoprazole  Injectable 40 milliGRAM(s) IV Push every 12 hours  polyethylene glycol 3350 17 Gram(s) Oral two times a day  senna 2 Tablet(s) Oral at bedtime            chlorhexidine 0.12% Liquid 15 milliLiter(s) Swish and Spit every 4 hours PRN        LABS:                        8.4    4.5   )-----------( 164      ( 04 Feb 2018 23:53 )             25.2     Hgb Trend: 8.4<--, 8.7<--, 9.1<--, 9.2<--, 7.9<--  02-04    137  |  96  |  61<H>  ----------------------------<  404<H>  3.2<L>   |  22  |  3.73<H>    Ca    8.7      04 Feb 2018 23:53  Phos  4.4     02-04  Mg     2.5     02-04    TPro  6.1  /  Alb  2.4<L>  /  TBili  1.5<H>  /  DBili  x   /  AST  81<H>  /  ALT  807<H>  /  AlkPhos  81  02-04    Creatinine Trend: 3.73<--, 2.67<--, 1.86<--, 3.44<--, 2.92<--, 4.22<--  PT/INR - ( 04 Feb 2018 23:53 )   PT: 11.4 sec;   INR: 1.04 ratio         PTT - ( 04 Feb 2018 23:53 )  PTT:25.2 sec

## 2018-02-05 NOTE — PROGRESS NOTE ADULT - ASSESSMENT
67F w PMH of bilateral Breast CA (on Trastuzumab, last dose Jan 20th) admitted to MICU with influenza PNA c/b PEA arrest x 2 with ROSC x 2 (10 min + 30 min). CPR c/b bilateral PTX (s/p bilateral chest tubes), pneumoperitoneum (s/p Peritoneal drain (pigtail) in right hemiabdomen and paracentesis decompression) a/w extensive subcutaneous emphysema. Pt received 100 mg of empiric TPA in ED. Surgery consulted and not recommending surgical intervention.   All 3 chest tubes removed on 1/31. Pt had cardiac MRI on 2/1 which was wnl. Right sided IJ removed and right shiley placed and HD initiated on 2/1. Abdominal drain removed on 2/1. AM chest xray on 2/2 showed enlargement of previously noted left PTX.  Chest XRAY 12/5 no pneumothorax.     Neurologic:  Alteration of mental status present. Likely due possible structural anoxic brain injury due to prolonged PEA arrest x2 + CTH after TPA showing evidence of new small acute left PCA territory infarct without hemorrhagic conversion. Repeat CTH after 24 hours and on 2/3 showing no acute changes.   Will pursue MRI head if pts condition improves    Skin:  Lines: Right IJ Shiley  Decubiti: None    GI:  Diet: trickle feeds  Pt with persistent pneumoperitoneum seen on xray on 2/3. potentially secondary to pneumothorax communication. Imaging showing no evidence of viscous perforation.   Surgery and CT surgery not recommending any intervention.   GI stress prophylaxis indicated pantoprazole 40IV daily    Metabolic:  BMP showing evidence of RUSS 2/2 ATN 2/2 PEA arrest.   still anuric cr trending upward- plan for HD today  Patient with evidence of shock liver 2/2 to prolonged PEA arrest. ALT and AST were both in the thousands, now trending down     Volume Assessment:  No peripheral edema  No EVLW on US  No evidence of disturbance of effective circulating volume on US    Hematologic:  DVT prophylaxis with HSQ             Infectious Disease:  s/p oseltamivir for influenza  c/w pip tazo 6/7 for possible aspiration PNA vs micro perf    Hemodynamics:  Patient is not on pressors.    Cardiovascular:.  Cardiac MRI unremarkable. Shows normal RV and LV function.       Respiratory:  Pt remains intubated on low FIO2 requirement with PEEP of 5. CXR 2/5- pneumothorax resolved

## 2018-02-05 NOTE — PROGRESS NOTE ADULT - ATTENDING COMMENTS
67F PMH HTN, DM2 (on insulin), breast ca on trastuzumab, s/p b/l mastectomy came to [facility] with Influenza A. She went into acute hypoxic resp failure in the ED requiring intubation (initial esophageal intubation - thereafter corrected to tracheal intubation). Subsequently, she went into PEA cardiac arrest, successfully resuscitated after 5 mins. She then went into PEA arrest again and required 30 mins of CPR before getting ROSC. She received tPA for suspected PE and had 2 chest tubes and a peritoneal drain placed during/after CPR. Course further complicated by septic/cardiogenic shock, RUSS/ATN, shock liver, pneumonia, left pneumothorax and pneumoperitoneum (no intraabdominal viscus perforation is found). 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 She has uncontrolled hyperglycemia now.     follows simple commands  does not move LUE and RLE  check MRI brain to assess extent of CVA (per Neuro)  continue aspirin  not sedated  BP is stable, continue hydralazine, labetalol  cardiac fn normal on cardiac MRI  on PSV 15/5 however Vt 300-400s, will lower PS and see how she tolerates  recurrence of left ptx after d/cing suction - connect back to suction, no air leak found this morning  rt lung opacities worse however no fever, leucocytosis - continue zosyn, finished Tamiflu  Cx neg for MRSA and received vanc >5 days - d/c and monitor  she will need evaluation for esophageal injury once extubated   increase TF to goal  continue bowel regimen  LFTs improving, not on statin  dialysis per renal  hypokalemia - replace  avoid hepatotoxic, nephrotoxic meds  change lantus to NPH, continue ISS, need to get better glucose control  protonix, sc heparin for ppx  55 mins critical care time spent

## 2018-02-05 NOTE — PROGRESS NOTE ADULT - SUBJECTIVE AND OBJECTIVE BOX
THE PATIENT WAS SEEN AND EXAMINED BY ME WITH THE HOUSESTAFF AND STROKE TEAM DURING MORNING ROUNDS.   HPI:  68y/o F hx Breast Cancer (diagnosed in Feb 2017) currently on Herceptin (last dose Jan 20th), HTN, Diabetes on 70/30, presenting with Fever at home, tmax of 102 Day PTA, with whole body weakness. Patient also had decreased appetite . Patient's daughter states that she herself felt unwell and was going to bring both herself and her mother to the doctor's office, However, given pt had worsening weakness, daughter brought the patient to the ER. Patient is otherwise noted to be independent and lives at home with her . She is AOx4 at baseline.     In the ER, patient was found to have T max of 100.6, with initial vitals of 99.5, HR 82, /78, RR 18, O2 sat 93% on room air. She was given ceftriaxone x1, and given 2L normal saline bolus with Duoneb x 3. Patient was also found to be RVP positive for flu. Patient then went into PEA arrest with chest compressions for 10min, received Rosc after Epi x 2, and was intubated. Patient required re-intubation ( intubated initially into the esophagus) after she PEA arrested again, with ROSC achieved after 30min with Epi x 5. Patient was also found to have bilateral pneumothoraces, s/p 3 chest tubes, 2 on the right side, and 1 on the left. Patient also has paracentesis drain for decompression of the abdomen.       SUBJECTIVE: No events overnight.  No new neurologic complaints.      ALBUTerol/ipratropium for Nebulization 3 milliLiter(s) Nebulizer every 6 hours  aspirin  chewable 81 milliGRAM(s) Oral daily  chlorhexidine 0.12% Liquid 15 milliLiter(s) Swish and Spit every 4 hours PRN  heparin  Injectable 5000 Unit(s) SubCutaneous every 8 hours  hydrALAZINE 50 milliGRAM(s) Oral every 8 hours  hydrALAZINE Injectable 10 milliGRAM(s) IV Push every 6 hours PRN  insulin lispro (HumaLOG) corrective regimen sliding scale   SubCutaneous every 6 hours  insulin NPH human recombinant 11 Unit(s) SubCutaneous every 6 hours  labetalol 200 milliGRAM(s) Oral every 8 hours  midazolam Injectable 2 milliGRAM(s) IV Push every 2 hours PRN  pantoprazole  Injectable 40 milliGRAM(s) IV Push every 12 hours  piperacillin/tazobactam IVPB. 3.375 Gram(s) IV Intermittent every 12 hours  polyethylene glycol 3350 17 Gram(s) Oral two times a day  senna 2 Tablet(s) Oral at bedtime      PHYSICAL EXAM:   Vital Signs Last 24 Hrs  T(C): 36.6 (05 Feb 2018 12:33), Max: 37.5 (04 Feb 2018 22:00)  T(F): 97.9 (05 Feb 2018 12:33), Max: 99.5 (04 Feb 2018 22:00)  HR: 73 (05 Feb 2018 13:00) (63 - 85)  BP: 128/60 (05 Feb 2018 13:00) (115/54 - 181/74)  BP(mean): 87 (05 Feb 2018 13:00) (78 - 117)  RR: 18 (05 Feb 2018 12:33) (18 - 26)  SpO2: 98% (05 Feb 2018 13:00) (96% - 99%)      ****Exam is limited as patient is orally intubated***.   General:  Elderly female. Orally intubated. Currently on CPAP with spontaneous respirations noted over setting. No acute distress  HEENT: Right gaze preference. PERRL, does not track. + Oculocephalics.  Abdomen: Soft, nontender, nondistended . Orogastric tube in place.  Extremities: Edema noted to LUE.     NEUROLOGICAL EXAM:   Mental status: Awake, eyes open with right gaze preference noted, does return to midline . Attempts to follow some simple commands at times: appropriately raised right thumb on command.   Cranial Nerves: No discernible facial asymmetry, no nystagmus.   Motor exam: Decreased  tone, Unable to assess for drift,  not following . No Myoclonic activity noted, no posturing or tremors noted.  Sensation: RUE withdraw to pain.   Coordination/ Gait: Unable to assess at this time.      LABS:                        8.4    4.5   )-----------( 164      ( 04 Feb 2018 23:53 )             25.2    02-04    137  |  96  |  61<H>  ----------------------------<  404<H>  3.2<L>   |  22  |  3.73<H>    Ca    8.7      04 Feb 2018 23:53  Phos  4.4     02-04  Mg     2.5     02-04    TPro  6.1  /  Alb  2.4<L>  /  TBili  1.5<H>  /  DBili  x   /  AST  81<H>  /  ALT  807<H>  /  AlkPhos  81  02-04  PT/INR - ( 04 Feb 2018 23:53 )   PT: 11.4 sec;   INR: 1.04 ratio         PTT - ( 04 Feb 2018 23:53 )  PTT:25.2 sec  Hemoglobin A1C, Whole Blood: 8.6 % (01-31 @ 07:15)  Hemoglobin A1C, Whole Blood: 8.7 % (01-31 @ 05:51)      IMAGING: Reviewed by me.     CT Head No Cont (02.03.18 @ 15:01)       INTERPRETATION:  History: Follow-up infarct.    Multiple axial sections were performed from base of skull to vertex   without contrast enhancement. Coronal and sagittal reconstructions were   performed as well.    Abnormal low-attenuation involving the left occipital cortical and   subcortical region is again identified. This appears slightly more   conspicuous when compared the prior noncontrast head CT performedon   January 31, 2018 and is compatible with an acute to subacute infarct.   There is localized mass effect seen. No significant shift or herniation   seen. No hemorrhagic transformation is seen at this time.    There is no evidence acute hemorrhage in the posterior fossa or   supratentorial region    Evaluation of the osseous structures with the appropriate window appears   unremarkable    Small fluid level in the right maxillary and bilateral sphenoid sinuses.    Left maxillary bilateral ethmoid sinus mucosal thickening is seen.    Opacification of the left mastoid is again seen.    Patient is status post bilateral cataract surgery.    Orotracheal and orogastric tubes are seen.    Impression: Stable exam.       CT Head No Cont (01.31.18 @ 03:17)      INTERPRETATION:  HISTORY: Fever, worsening whole-body weakness, flu   positive, cardiac arrest status post chest compressions/chest   tubes/intubation/pressors, status post TPA administration for pulmonary   embolus, history of breast cancer on Herceptin.    COMPARISON: Prior head and maxillofacial CT dated 3/1/2015    IMPRESSION:    CT BRAIN:     New small acute left PCA territory infarct without hemorrhagic   transformation.    Paranasal sinus opacification as described consistent with sinusitis.    Chronic left otomastoiditis with underlying cholesteatoma.    CT NECK:    Extensive subcutaneous emphysema in the neck as described. No gross   evidence for abscess or hematoma.    Please see separate chest CT report for description of the thoracic   findings.      < end of copied text       EEG REPORT:     History:  - H/O L PCA Infarct   - Concern for Seizures     EEG Classification / Summary:  Abnormal EEG in an unresponsive patient due to:  -Asymmetry, attenuation of faster frequency activity over the left parieto-occipital region.   -Moderate to marked continuous background slowing    Clinical Impression:  Findings are consistent with moderate to marked nonspecific diffuse or multifocal cerebral encephalopathy, with evidence of cortical injury in the left posterior quadrant, maximum occipitally. There are no definitive epileptiform abnormalities recorded.         Limited Transthoracic Echo (02.01.18 @ 14:18)   -------------  Conclusions:  1. Endocardium not well visualized; unable to evaluate left  ventricular systolic function.  2. The right ventricle is not well visualized.  3. Unable to rule out pericardial effusion.  *** No previous Echo exam. Uninterpretable study due to  subcutaneous emphysema.

## 2018-02-05 NOTE — PROGRESS NOTE ADULT - ASSESSMENT
ASSESSMENT: This is a 67ywoman with67 y/o woman w/ hx Breast Cancer (diagnosed in Feb 2017) currently on Herceptin, HTN, DM II on 70/30 p/w fever, RVP positive for flu on 1/30. PEA arrest with chest compressions for 10min, received ROSC after Epi x 2, intubated, requiring re-intubation s/p recurrent PEA w/ ROSC after 30 mins. On neurological exam, patient is awake with eyes open, right gaze preference noted. EEG shows no epileptiform abnormalities with moderate to marked background slowing. Consider Keppra prophylaxis despite epileptiform activity as patient is high risk for seizure activity in this neurological setting.    Patient does  attempt to follow some simple commands on occasion: now off sedation. CT of the head  shows  small acute left PCA territory infarct without hemorrhagic transformation. Neurological exam does not correlate with findings on imaging.  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 Recommend MRI/A head and neck when possible, discussed with MICU Team. Continue to follow.     Recommend:  - Please repeat lipid profile (last LDL was unable, HDL 8)  -  lipitor 40 mg qhs unless otherwise contraindicated  - MRI brain w/o contrast MRA head/neck w/o contrast when able.  - TTE w/ bubble study (previous study was limited)      NEURO: Continue close monitoring for neurologic deterioration, permissive HTN with gradual return to normotension, avoid hypotension. Titrate statin to LDL goal less than 70.  MRI Brain w/o, MRA Head w/o and Neck w/contrast when possible . Physical therapy/OT/Speech eval/treatment.     ANTITHROMBOTIC THERAPY: Continue ASA for secondary stroke prophylaxis.     PULMONARY: Orally intubated. Chest tubes to wall suction. Patient on CPAP, saturating well, with spontaneous respirations noted over setting.     CARDIOVASCULAR: check TTE with bubble, Continue  cardiac monitoring                              SBP goal: <200/105    GASTROINTESTINAL: Ulcer prophylaxis, dysphagia screen when extuabted      Diet: Orogastric tube feeding in place, aspiration precautions maintained.    RENAL: BUN/Cr worsening, defer to primary MICU team/ Nephrology for management      Na Goal: Greater than 135     Wheeler: in place     HEMATOLOGY: H/H slight downtrend 8.4/5.2, Platelets 164.      DVT ppx: Heparin s.c [x] LMWH []     ID: afebrile, no leukocytosis     DISPOSITION: Rehab or home depending on PT eval once stable and workup is complete      CORE MEASURES:        Admission NIHSS: 24      TPA: [x] YES [] NO  in setting of cardiac arrest     LDL/HDL: Please repeat. LDL unable, HDL 8     Depression Screen: pending     Statin Therapy: Pending unless contraindicated     Dysphagia Screen: [] PASS [] FAIL    : Pending,  Orogastric tube in place,      Smoking [] YES [x] NO      Afib [] YES [x] NO     Stroke Education [x] YES [] NO ASSESSMENT: This is a 67ywoman with67 y/o woman w/ hx Breast Cancer (diagnosed in Feb 2017) currently on Herceptin, HTN, DM II on 70/30 p/w fever, RVP positive for flu on 1/30. PEA arrest with chest compressions for 10min, received ROSC after Epi x 2, intubated, requiring re-intubation s/p recurrent PEA w/ ROSC after 30 mins. On neurological exam, patient is awake with eyes open, right gaze preference noted. EEG shows no epileptiform abnormalities with moderate to marked background slowing. Consider Keppra prophylaxis despite epileptiform activity as patient is high risk for seizure activity in this neurological setting. 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    Patient does  attempt to follow some simple commands on occasion: now off sedation. CT of the head  shows  small acute left PCA territory infarct without hemorrhagic transformation. Neurological exam does not correlate with findings on imaging.  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 Recommend MRI/A head and neck when possible, discussed with MICU Team. Continue to follow.     Recommend:  - Please repeat lipid profile (last LDL was unable, HDL 8)  -  lipitor 40 mg qhs unless otherwise contraindicated  - MRI brain w/o contrast MRA head/neck w/o contrast when able.  - TTE w/ bubble study (previous study was limited)      NEURO: Continue close monitoring for neurologic deterioration, permissive HTN with gradual return to normotension, avoid hypotension. Titrate statin to LDL goal less than 70.  MRI Brain w/o, MRA Head w/o and Neck w/contrast when possible . Physical therapy/OT/Speech eval/treatment.     ANTITHROMBOTIC THERAPY: Continue ASA for secondary stroke prophylaxis.     PULMONARY: Orally intubated. Chest tubes to wall suction. Patient on CPAP, saturating well, with spontaneous respirations noted over setting.     CARDIOVASCULAR: check TTE with bubble, Continue  cardiac monitoring                              SBP goal: <200/105    GASTROINTESTINAL: Ulcer prophylaxis, dysphagia screen when extuabted      Diet: Orogastric tube feeding in place, aspiration precautions maintained.    RENAL: BUN/Cr worsening, defer to primary MICU team/ Nephrology for management      Na Goal: Greater than 135     Wheeler: in place     HEMATOLOGY: H/H slight downtrend 8.4/5.2, Platelets 164.      DVT ppx: Heparin s.c [x] LMWH []     ID: afebrile, no leukocytosis     DISPOSITION: Rehab or home depending on PT eval once stable and workup is complete      CORE MEASURES:        Admission NIHSS: 24      TPA: [x] YES [] NO  in setting of cardiac arrest     LDL/HDL: Please repeat. LDL unable, HDL 8     Depression Screen: pending     Statin Therapy: Pending unless contraindicated     Dysphagia Screen: [] PASS [] FAIL    : Pending,  Orogastric tube in place,      Smoking [] YES [x] NO      Afib [] YES [x] NO     Stroke Education [x] YES [] NO

## 2018-02-05 NOTE — PROGRESS NOTE ADULT - PROBLEM SELECTOR PLAN 1
likely from ATN in setting of cardiac arrest. Pt continues to be oligo anuric  s/p 3rd hd session on 02/03.  Currently with no e/o renal recovery with no urine output. likely from ATN in setting of cardiac arrest. Pt continues to be oligo anuric  s/p 3rd hd session on 02/03.  Currently with no e/o renal recovery with no urine output. schedule for HD today with 4 K+ bath.

## 2018-02-05 NOTE — CHART NOTE - NSCHARTNOTEFT_GEN_A_CORE
Follow up note.    67F w PMH of bilateral Breast CA (on Trastuzumab, last dose Jan 20th) admitted to MICU with influenza PNA c/b PEA arrest x 2 with ROSC x 2 (10 min + 30 min). CPR c/b bilateral PTX (s/p bilateral chest tubes), pneumoperitoneum (s/p Peritoneal drain (pigtail) in right hemiabdomen and paracentesis decompression) a/w extensive subcutaneous emphysema. Pt received 100 mg of empiric TPA in ED. Surgery consulted and not recommending surgical intervention.   All 3 chest tubes removed on 1/31. Pt had cardiac MRI on 2/1 which was wnl. Right sided IJ removed and right shiley placed and HD initiated on 2/1. Abdominal drain removed on 2/1. AM chest xray on 2/2 showed enlargement of previously noted left PTX. New Chest tube placed on left side. 	    Source: Patient [ ]    Family [ ]     other [ ]    Diet :       Patient reports [ ] nausea  [ ] vomiting [ ] diarrhea [ ] constipation  [ ]chewing problems [ ] swallowing issues  [ ] other:      PO intake:  < 50% [ ] 50-75% [ ]   % [ ]  other :     Source for PO intake [ ] Patient [ ] family [ ] chart [ ] staff [ ] other     Enteral /Parenteral Nutrition:       Current Weight: Weight (kg): 74.8 (01-30 @ 18:15)  % Weight Change    Pertinent Medications: MEDICATIONS  (STANDING):  ALBUTerol/ipratropium for Nebulization 3 milliLiter(s) Nebulizer every 6 hours  aspirin  chewable 81 milliGRAM(s) Oral daily  heparin  Injectable 5000 Unit(s) SubCutaneous every 8 hours  hydrALAZINE 25 milliGRAM(s) Oral every 8 hours  insulin glargine Injectable (LANTUS) 18 Unit(s) SubCutaneous at bedtime  insulin lispro (HumaLOG) corrective regimen sliding scale   SubCutaneous every 4 hours  labetalol 200 milliGRAM(s) Oral every 8 hours  oseltamivir Suspension 30 milliGRAM(s) Oral daily  pantoprazole  Injectable 40 milliGRAM(s) IV Push every 12 hours  piperacillin/tazobactam IVPB. 3.375 Gram(s) IV Intermittent every 12 hours  polyethylene glycol 3350 17 Gram(s) Oral two times a day  senna 2 Tablet(s) Oral at bedtime    MEDICATIONS  (PRN):  chlorhexidine 0.12% Liquid 15 milliLiter(s) Swish and Spit every 4 hours PRN mouth hygiene  hydrALAZINE Injectable 10 milliGRAM(s) IV Push every 6 hours PRN SBP > 170  midazolam Injectable 2 milliGRAM(s) IV Push every 2 hours PRN Agitation    Pertinent Labs:  02-04 Na137 mmol/L Glu 404 mg/dL<H> K+ 3.2 mmol/L<L> Cr  3.73 mg/dL<H> BUN 61 mg/dL<H> Phos 4.4 mg/dL Alb 2.4 g/dL<L> PAB n/a         Skin:     Estimated Needs:   [ ] no change since previous assessment  [ ] recalculated:       Previous Nutrition Diagnosis:     [ ] Inadequate Energy Intake [ ]Inadequate Oral Intake [ ] Excessive Energy Intake     [ ] Underweight [ ] Increased Nutrient Needs [ ] Overweight/Obesity     [ ] Altered GI Function [ ] Unintended Weight Loss [ ] Food & Nutrition Related Knowledge Deficit [ ] Malnutrition          Nutrition Diagnosis is [ ] ongoing  [ ] resolved [ ] not applicable          New Nutrition Diagnosis: [ ] not applicable    [ ] Inadequate Protein Energy Intake [ ]Inadequate Oral Intake [ ] Excessive Energy Intake     [ ] Underweight [ ] Increased Nutrient Needs [ ] Overweight/Obesity     [ ] Altered GI Function [ ] Unintended Weight Loss [ ] Food & Nutrition Related Knowledge Deficit[ ] Limited Adherence to nutrition related recommendations [ ] Malnutrition  [ ] other: Free text       Related to:      As evidenced by:      Interventions:     Recommend    [ ] Change Diet To:    [ ] Nutrition Supplement    [ ] Nutrition Support    [ ] Other:        Monitoring and Evaluation:     [ ] PO intake [ ] Tolerance to diet prescription [ ] weights [ ] follow up per protocol    [ ] other: Follow up note.    67F w PMH of bilateral Breast CA (on Trastuzumab, last dose Jan 20th) admitted to MICU with influenza PNA c/b PEA arrest x 2 with ROSC x 2 (10 min + 30 min). CPR c/b bilateral PTX (s/p bilateral chest tubes), pneumoperitoneum (s/p Peritoneal drain (pigtail) in right hemiabdomen and paracentesis decompression) a/w extensive subcutaneous emphysema.   All 3 chest tubes removed on 1/31. . Right sided IJ removed and right shiley placed and HD initiated on 2/1. Abdominal drain removed on 2/1. AM chest xray on 2/2 showed enlargement of previously noted left PTX. New Chest tube placed on left side. Plan for HD today.      Source: Patient [ ]    Family [ ]     other [x ] chart    Diet : 2/3 Nepro 20cc/hr x 24 hrs (trickle feeds initiated 2/2 pneumoperitoneum)    GI: no V, + abd distention, 2/4 diarrhea     Enteral /Parenteral Nutrition: 24 hr intake 2/5 440cc, 2/4 440cc      Current Weight: 2/5 169, 2/3 172, 2/1 174, dosing wt 1/30 165 lb   usual wt 159lb used for assessment  wt changes 2/2 fluid shifts, has 3+ edema, on HD    Pertinent Medications: MEDICATIONS  (STANDING):  ALBUTerol/ipratropium for Nebulization 3 milliLiter(s) Nebulizer every 6 hours  aspirin  chewable 81 milliGRAM(s) Oral daily  heparin  Injectable 5000 Unit(s) SubCutaneous every 8 hours  hydrALAZINE 25 milliGRAM(s) Oral every 8 hours  insulin glargine Injectable (LANTUS) 18 Unit(s) SubCutaneous at bedtime  insulin lispro (HumaLOG) corrective regimen sliding scale   SubCutaneous every 4 hours  labetalol 200 milliGRAM(s) Oral every 8 hours  oseltamivir Suspension 30 milliGRAM(s) Oral daily  pantoprazole  Injectable 40 milliGRAM(s) IV Push every 12 hours  piperacillin/tazobactam IVPB. 3.375 Gram(s) IV Intermittent every 12 hours  polyethylene glycol 3350 17 Gram(s) Oral two times a day  senna 2 Tablet(s) Oral at bedtime    MEDICATIONS  (PRN):  chlorhexidine 0.12% Liquid 15 milliLiter(s) Swish and Spit every 4 hours PRN mouth hygiene  hydrALAZINE Injectable 10 milliGRAM(s) IV Push every 6 hours PRN SBP > 170  midazolam Injectable 2 milliGRAM(s) IV Push every 2 hours PRN Agitation    Pertinent Labs:  02-04 Na137 mmol/L Glu 404 mg/dL<H> K+ 3.2 mmol/L<L> Cr  3.73 mg/dL<H> BUN 61 mg/dL<H> Phos 4.4 mg/dL Alb 2.4 g/dL<L> PAB n/a     , 347, 379, 362 noted lantus increased yesterday, changed to high dose correction insulin    Skin: no pressure injuries    Estimated Needs:   [x ] no change since previous assessment  [ ] recalculated:       Previous Nutrition Diagnosis: none    New Nutrition Diagnosis:  [x ] Increased Nutrient Needs, protein      Related to: increased demands for protein     As evidenced by: pt on HD      Recommend    [ ] Change Diet To:    [ ] Nutrition Supplement    [x ] Nutrition Support: Recommend increase tube feeds as tolerated to goal of Nepro 55 cc/hr x 18 hrs provides 1782 kcals, 80 gm protein, 720cc free water and 2 pkg No Carb Prosource (120 kcals, 30gm protein), together will provide 1902 kcals (26 kcals/kg), 110 gm protein (1.5gm protein/kg)  usual wt of 72.1kg    [ ] Other:        Monitoring and Evaluation:     [ ] PO intake [x ] Tolerance to diet prescription [x ] weights [x ] follow up per protocol    [ ] other:

## 2018-02-05 NOTE — PROGRESS NOTE ADULT - ATTENDING COMMENTS
VASCULAR NEUROLOGY ATTENDING  The patient is seen and examined the history and imaging are reviewed. I agree with the resident note unless otherwise noted. Patients clinical condition likely combination of hypoxic ischemic encephalopathy and patients L PCA infarct. Agree with MRI brain when able. Stroke etiology unclear but presumably cardioembolism related to patients overall clinical course. No objection to ASA for now.

## 2018-02-06 LAB
ALBUMIN SERPL ELPH-MCNC: 2.6 G/DL — LOW (ref 3.3–5)
ALP SERPL-CCNC: 111 U/L — SIGNIFICANT CHANGE UP (ref 40–120)
ALT FLD-CCNC: 569 U/L RC — HIGH (ref 10–45)
ANION GAP SERPL CALC-SCNC: 14 MMOL/L — SIGNIFICANT CHANGE UP (ref 5–17)
APTT BLD: 23.1 SEC — LOW (ref 27.5–37.4)
AST SERPL-CCNC: 43 U/L — HIGH (ref 10–40)
BILIRUB SERPL-MCNC: 1.3 MG/DL — HIGH (ref 0.2–1.2)
BUN SERPL-MCNC: 41 MG/DL — HIGH (ref 7–23)
CALCIUM SERPL-MCNC: 8.6 MG/DL — SIGNIFICANT CHANGE UP (ref 8.4–10.5)
CHLORIDE SERPL-SCNC: 94 MMOL/L — LOW (ref 96–108)
CO2 SERPL-SCNC: 26 MMOL/L — SIGNIFICANT CHANGE UP (ref 22–31)
CREAT SERPL-MCNC: 2.61 MG/DL — HIGH (ref 0.5–1.3)
CULTURE RESULTS: SIGNIFICANT CHANGE UP
CULTURE RESULTS: SIGNIFICANT CHANGE UP
GLUCOSE BLDC GLUCOMTR-MCNC: 112 MG/DL — HIGH (ref 70–99)
GLUCOSE BLDC GLUCOMTR-MCNC: 309 MG/DL — HIGH (ref 70–99)
GLUCOSE BLDC GLUCOMTR-MCNC: 340 MG/DL — HIGH (ref 70–99)
GLUCOSE BLDC GLUCOMTR-MCNC: 427 MG/DL — HIGH (ref 70–99)
GLUCOSE SERPL-MCNC: 374 MG/DL — HIGH (ref 70–99)
HCT VFR BLD CALC: 25.8 % — LOW (ref 34.5–45)
HGB BLD-MCNC: 8.9 G/DL — LOW (ref 11.5–15.5)
INR BLD: 1.08 RATIO — SIGNIFICANT CHANGE UP (ref 0.88–1.16)
MAGNESIUM SERPL-MCNC: 2.3 MG/DL — SIGNIFICANT CHANGE UP (ref 1.6–2.6)
MCHC RBC-ENTMCNC: 30.6 PG — SIGNIFICANT CHANGE UP (ref 27–34)
MCHC RBC-ENTMCNC: 34.7 GM/DL — SIGNIFICANT CHANGE UP (ref 32–36)
MCV RBC AUTO: 88.3 FL — SIGNIFICANT CHANGE UP (ref 80–100)
PHOSPHATE SERPL-MCNC: 3.6 MG/DL — SIGNIFICANT CHANGE UP (ref 2.5–4.5)
PLATELET # BLD AUTO: 213 K/UL — SIGNIFICANT CHANGE UP (ref 150–400)
POTASSIUM SERPL-MCNC: 3.5 MMOL/L — SIGNIFICANT CHANGE UP (ref 3.5–5.3)
POTASSIUM SERPL-SCNC: 3.5 MMOL/L — SIGNIFICANT CHANGE UP (ref 3.5–5.3)
PROT SERPL-MCNC: 6.3 G/DL — SIGNIFICANT CHANGE UP (ref 6–8.3)
PROTHROM AB SERPL-ACNC: 11.7 SEC — SIGNIFICANT CHANGE UP (ref 9.8–12.7)
RBC # BLD: 2.92 M/UL — LOW (ref 3.8–5.2)
RBC # FLD: 12.7 % — SIGNIFICANT CHANGE UP (ref 10.3–14.5)
SODIUM SERPL-SCNC: 134 MMOL/L — LOW (ref 135–145)
SPECIMEN SOURCE: SIGNIFICANT CHANGE UP
SPECIMEN SOURCE: SIGNIFICANT CHANGE UP
VANCOMYCIN FLD-MCNC: 13.3 UG/ML — SIGNIFICANT CHANGE UP
WBC # BLD: 6.7 K/UL — SIGNIFICANT CHANGE UP (ref 3.8–10.5)
WBC # FLD AUTO: 6.7 K/UL — SIGNIFICANT CHANGE UP (ref 3.8–10.5)

## 2018-02-06 PROCEDURE — 71045 X-RAY EXAM CHEST 1 VIEW: CPT | Mod: 26

## 2018-02-06 PROCEDURE — 71045 X-RAY EXAM CHEST 1 VIEW: CPT | Mod: 26,77,76

## 2018-02-06 PROCEDURE — 32551 INSERTION OF CHEST TUBE: CPT | Mod: GC

## 2018-02-06 PROCEDURE — 70551 MRI BRAIN STEM W/O DYE: CPT | Mod: 26

## 2018-02-06 PROCEDURE — 99291 CRITICAL CARE FIRST HOUR: CPT | Mod: 25

## 2018-02-06 RX ORDER — INSULIN HUMAN 100 [IU]/ML
24 INJECTION, SOLUTION SUBCUTANEOUS EVERY 6 HOURS
Qty: 0 | Refills: 0 | Status: DISCONTINUED | OUTPATIENT
Start: 2018-02-06 | End: 2018-02-06

## 2018-02-06 RX ORDER — POTASSIUM CHLORIDE 20 MEQ
20 PACKET (EA) ORAL ONCE
Qty: 0 | Refills: 0 | Status: COMPLETED | OUTPATIENT
Start: 2018-02-06 | End: 2018-02-06

## 2018-02-06 RX ORDER — INSULIN LISPRO 100/ML
8 VIAL (ML) SUBCUTANEOUS ONCE
Qty: 0 | Refills: 0 | Status: COMPLETED | OUTPATIENT
Start: 2018-02-06 | End: 2018-02-06

## 2018-02-06 RX ORDER — INSULIN LISPRO 100/ML
VIAL (ML) SUBCUTANEOUS EVERY 6 HOURS
Qty: 0 | Refills: 0 | Status: DISCONTINUED | OUTPATIENT
Start: 2018-02-06 | End: 2018-02-06

## 2018-02-06 RX ORDER — HUMAN INSULIN 100 [IU]/ML
18 INJECTION, SUSPENSION SUBCUTANEOUS EVERY 6 HOURS
Qty: 0 | Refills: 0 | Status: DISCONTINUED | OUTPATIENT
Start: 2018-02-06 | End: 2018-02-07

## 2018-02-06 RX ORDER — SODIUM CHLORIDE 9 MG/ML
500 INJECTION, SOLUTION INTRAVENOUS ONCE
Qty: 0 | Refills: 0 | Status: COMPLETED | OUTPATIENT
Start: 2018-02-06 | End: 2018-02-06

## 2018-02-06 RX ORDER — INSULIN LISPRO 100/ML
VIAL (ML) SUBCUTANEOUS EVERY 6 HOURS
Qty: 0 | Refills: 0 | Status: DISCONTINUED | OUTPATIENT
Start: 2018-02-06 | End: 2018-02-07

## 2018-02-06 RX ORDER — HUMAN INSULIN 100 [IU]/ML
18 INJECTION, SUSPENSION SUBCUTANEOUS EVERY 6 HOURS
Qty: 0 | Refills: 0 | Status: DISCONTINUED | OUTPATIENT
Start: 2018-02-06 | End: 2018-02-06

## 2018-02-06 RX ORDER — FENTANYL CITRATE 50 UG/ML
100 INJECTION INTRAVENOUS ONCE
Qty: 0 | Refills: 0 | Status: DISCONTINUED | OUTPATIENT
Start: 2018-02-06 | End: 2018-02-06

## 2018-02-06 RX ORDER — INSULIN LISPRO 100/ML
VIAL (ML) SUBCUTANEOUS EVERY 4 HOURS
Qty: 0 | Refills: 0 | Status: DISCONTINUED | OUTPATIENT
Start: 2018-02-06 | End: 2018-02-06

## 2018-02-06 RX ORDER — INSULIN LISPRO 100/ML
6 VIAL (ML) SUBCUTANEOUS ONCE
Qty: 0 | Refills: 0 | Status: DISCONTINUED | OUTPATIENT
Start: 2018-02-06 | End: 2018-02-06

## 2018-02-06 RX ORDER — INSULIN GLARGINE 100 [IU]/ML
20 INJECTION, SOLUTION SUBCUTANEOUS AT BEDTIME
Qty: 0 | Refills: 0 | Status: DISCONTINUED | OUTPATIENT
Start: 2018-02-06 | End: 2018-02-07

## 2018-02-06 RX ADMIN — Medication 81 MILLIGRAM(S): at 11:14

## 2018-02-06 RX ADMIN — FENTANYL CITRATE 100 MICROGRAM(S): 50 INJECTION INTRAVENOUS at 12:00

## 2018-02-06 RX ADMIN — Medication 20 MILLIEQUIVALENT(S): at 11:13

## 2018-02-06 RX ADMIN — SODIUM CHLORIDE 1000 MILLILITER(S): 9 INJECTION, SOLUTION INTRAVENOUS at 12:20

## 2018-02-06 RX ADMIN — Medication 6: at 06:07

## 2018-02-06 RX ADMIN — Medication 3 MILLILITER(S): at 17:14

## 2018-02-06 RX ADMIN — HEPARIN SODIUM 5000 UNIT(S): 5000 INJECTION INTRAVENOUS; SUBCUTANEOUS at 23:37

## 2018-02-06 RX ADMIN — Medication 8: at 11:16

## 2018-02-06 RX ADMIN — PANTOPRAZOLE SODIUM 40 MILLIGRAM(S): 20 TABLET, DELAYED RELEASE ORAL at 11:13

## 2018-02-06 RX ADMIN — Medication 8 UNIT(S): at 17:54

## 2018-02-06 RX ADMIN — Medication 3 MILLILITER(S): at 05:30

## 2018-02-06 RX ADMIN — HUMAN INSULIN 18 UNIT(S): 100 INJECTION, SUSPENSION SUBCUTANEOUS at 17:16

## 2018-02-06 RX ADMIN — PANTOPRAZOLE SODIUM 40 MILLIGRAM(S): 20 TABLET, DELAYED RELEASE ORAL at 23:38

## 2018-02-06 RX ADMIN — FENTANYL CITRATE 100 MICROGRAM(S): 50 INJECTION INTRAVENOUS at 11:46

## 2018-02-06 RX ADMIN — HUMAN INSULIN 18 UNIT(S): 100 INJECTION, SUSPENSION SUBCUTANEOUS at 11:13

## 2018-02-06 RX ADMIN — PIPERACILLIN AND TAZOBACTAM 25 GRAM(S): 4; .5 INJECTION, POWDER, LYOPHILIZED, FOR SOLUTION INTRAVENOUS at 20:11

## 2018-02-06 RX ADMIN — PIPERACILLIN AND TAZOBACTAM 25 GRAM(S): 4; .5 INJECTION, POWDER, LYOPHILIZED, FOR SOLUTION INTRAVENOUS at 08:31

## 2018-02-06 RX ADMIN — HEPARIN SODIUM 5000 UNIT(S): 5000 INJECTION INTRAVENOUS; SUBCUTANEOUS at 15:40

## 2018-02-06 RX ADMIN — HEPARIN SODIUM 5000 UNIT(S): 5000 INJECTION INTRAVENOUS; SUBCUTANEOUS at 08:31

## 2018-02-06 RX ADMIN — Medication 8: at 17:15

## 2018-02-06 RX ADMIN — HUMAN INSULIN 11 UNIT(S): 100 INJECTION, SUSPENSION SUBCUTANEOUS at 06:06

## 2018-02-06 NOTE — PROGRESS NOTE ADULT - PROBLEM SELECTOR PLAN 1
likely from ATN in setting of cardiac arrest. Pt continues to be oligo anuric  s/p HD with 800 ml UF yesterday.  Currently with no e/o renal recovery with no urine output. Normal electrolytes. No indication for HD today.

## 2018-02-06 NOTE — PROCEDURE NOTE - ADDITIONAL PROCEDURE DETAILS
Old chest tube was not working (dislodged) and was removed. The patient positioned, prepped and draped in a sterile manner using chlorhexidine scrub. Previously made 2 cm incision at superior border of 6th rib at mid axillary line explored bluntly using my finger to identify site of pleural entry. 24 Singaporean tube inserted through previously made incision and directed posteriorly and superiorly when in the pleural space. Gush of air and tube frost noted. Tube secured with sutures and connected to pleurovac. A sterile occlusive dressing applied. No complications. Chest x-ray tube in appropriate position. Old chest tube was not working (dislodged) and was removed. The patient positioned, prepped and draped in a sterile manner using chlorhexidine scrub. Previously made 2 cm incision at superior border of 6th rib at mid axillary line explored bluntly using my finger to identify site of pleural entry. 28 Armenian tube inserted through previously made incision and directed posteriorly and superiorly when in the pleural space. Gush of air and tube frost noted. Tube secured with sutures and connected to pleurovac. A sterile occlusive dressing applied. No complications. Chest x-ray tube in appropriate position.

## 2018-02-06 NOTE — PROCEDURE NOTE - SUPERVISORY STATEMENT
Previous chest tube nonfunctional - removed.   Another 28 Fr chest tube placed through the previous incision under sterile technique, patient tolerated procedure well, no complications.   Family updated afterwards.

## 2018-02-06 NOTE — PROGRESS NOTE ADULT - SUBJECTIVE AND OBJECTIVE BOX
[facility] DIVISION OF KIDNEY DISEASES AND HYPERTENSION -- FOLLOW UP NOTE  --------------------------------------------------------------------------------  Chief Complaint:    24 hour events/subjective:        PAST HISTORY  --------------------------------------------------------------------------------  No significant changes to PMH, PSH, FHx, SHx, unless otherwise noted    ALLERGIES & MEDICATIONS  --------------------------------------------------------------------------------  Allergies    NIFEdipine (Urticaria; Hives)  vitamin E (Short breath; Urticaria; Hives)    Intolerances      Standing Inpatient Medications  ALBUTerol/ipratropium for Nebulization 3 milliLiter(s) Nebulizer every 6 hours  aspirin  chewable 81 milliGRAM(s) Oral daily  heparin  Injectable 5000 Unit(s) SubCutaneous every 8 hours  hydrALAZINE 50 milliGRAM(s) Oral every 8 hours  insulin lispro (HumaLOG) corrective regimen sliding scale   SubCutaneous every 6 hours  insulin NPH human recombinant 11 Unit(s) SubCutaneous every 6 hours  labetalol 200 milliGRAM(s) Oral every 8 hours  norepinephrine Infusion 0.1 MICROgram(s)/kG/Min IV Continuous <Continuous>  pantoprazole  Injectable 40 milliGRAM(s) IV Push every 12 hours  piperacillin/tazobactam IVPB. 3.375 Gram(s) IV Intermittent every 12 hours  polyethylene glycol 3350 17 Gram(s) Oral two times a day  senna 2 Tablet(s) Oral at bedtime    PRN Inpatient Medications  chlorhexidine 0.12% Liquid 15 milliLiter(s) Swish and Spit every 4 hours PRN  hydrALAZINE Injectable 10 milliGRAM(s) IV Push every 6 hours PRN  midazolam Injectable 2 milliGRAM(s) IV Push every 2 hours PRN      REVIEW OF SYSTEMS  --------------------------------------------------------------------------------  Gen: No weight changes, fatigue, fevers/chills, weakness  Skin: No rashes  Head/Eyes/Ears/Mouth: No headache; Normal hearing; Normal vision w/o blurriness; No sinus pain/discomfort, sore throat  Respiratory: No dyspnea, cough, wheezing, hemoptysis  CV: No chest pain, PND, orthopnea  GI: No abdominal pain, diarrhea, constipation, nausea, vomiting, melena, hematochezia  : No increased frequency, dysuria, hematuria, nocturia  MSK: No joint pain/swelling; no back pain; no edema  Neuro: No dizziness/lightheadedness, weakness, seizures, numbness, tingling  Heme: No easy bruising or bleeding  Endo: No heat/cold intolerance  Psych: No significant nervousness, anxiety, stress, depression    All other systems were reviewed and are negative, except as noted.    VITALS/PHYSICAL EXAM  --------------------------------------------------------------------------------  T(C): 35.6 (02-06-18 @ 04:00), Max: 36.8 (02-05-18 @ 16:00)  HR: 69 (02-06-18 @ 08:22) (61 - 85)  BP: 106/51 (02-06-18 @ 07:15) (80/38 - 181/74)  RR: 24 (02-06-18 @ 07:15) (18 - 28)  SpO2: 100% (02-06-18 @ 08:22) (96% - 100%)  Wt(kg): --        02-05-18 @ 07:01  -  02-06-18 @ 07:00  --------------------------------------------------------  IN: 1936 mL / OUT: 1520 mL / NET: 416 mL      Physical Exam:  	Gen: NAD, well-appearing  	HEENT: PERRL, supple neck, clear oropharynx  	Pulm: CTA B/L  	CV: RRR, S1S2; no rub  	Back: No spinal or CVA tenderness; no sacral edema  	Abd: +BS, soft, nontender/nondistended  	: No suprapubic tenderness  	UE: Warm, FROM, no clubbing, intact strength; no edema; no asterixis  	LE: Warm, FROM, no clubbing, intact strength; no edema  	Neuro: No focal deficits, intact gait  	Psych: Normal affect and mood  	Skin: Warm, without rashes  	Vascular access:    LABS/STUDIES  --------------------------------------------------------------------------------              8.9    6.7   >-----------<  213      [02-06-18 @ 00:08]              25.8     134  |  94  |  41  ----------------------------<  374      [02-06-18 @ 00:08]  3.5   |  26  |  2.61        Ca     8.6     [02-06-18 @ 00:08]      Mg     2.3     [02-06-18 @ 00:08]      Phos  3.6     [02-06-18 @ 00:08]    TPro  6.3  /  Alb  2.6  /  TBili  1.3  /  DBili  x   /  AST  43  /  ALT  569  /  AlkPhos  111  [02-06-18 @ 00:08]    PT/INR: PT 11.7 , INR 1.08       [02-06-18 @ 00:08]  PTT: 23.1       [02-06-18 @ 00:08]      Creatinine Trend:  SCr 2.61 [02-06 @ 00:08]  SCr 3.73 [02-04 @ 23:53]  SCr 2.67 [02-04 @ 06:53]  SCr 1.86 [02-03 @ 21:45]  SCr 3.44 [02-03 @ 09:34]    Urinalysis - [01-30-18 @ 12:52]      Color Yellow / Appearance SL Turbid / SG 1.025 / pH 6.0      Gluc 50 / Ketone Negative  / Bili Negative / Urobili Negative       Blood Trace / Protein 150 / Leuk Est Negative / Nitrite Negative      RBC 3-5 / WBC  / Hyaline  / Gran  / Sq Epi  / Non Sq Epi OCC / Bacteria       HbA1c 8.6      [01-31-18 @ 07:15]  Lipid: chol 149, , HDL 8, LDL Unable to calculate LDL Cholesterol --- Interpretive Comment (for adults 18 and over)  Optimal LDL Level may vary based on clinical situation  Below 70                  Ideal for people at very high risk of heart  disease  Below 100                Ideal for people at risk of heart disease  100 - 129                   [place]  130 - 159                   Borderline high  160 - 189                   High  190 and Above          Very high      [02-01-18 @ 15:34]    HBsAg Nonreact      [02-01-18 @ 22:49]  HCV 0.16, Nonreact      [02-01-18 @ 22:49] [facility] DIVISION OF KIDNEY DISEASES AND HYPERTENSION -- FOLLOW UP NOTE  --------------------------------------------------------------------------------  Chief Complaint: RUSS, s/p cardiac arrest    24 hour events/subjective:  s/p HD yesterday with 1 L UF  pt continues to be oligoanuric        PAST HISTORY  --------------------------------------------------------------------------------  No significant changes to PMH, PSH, FHx, SHx, unless otherwise noted    ALLERGIES & MEDICATIONS  --------------------------------------------------------------------------------  Allergies    NIFEdipine (Urticaria; Hives)  vitamin E (Short breath; Urticaria; Hives)    Intolerances      Standing Inpatient Medications  ALBUTerol/ipratropium for Nebulization 3 milliLiter(s) Nebulizer every 6 hours  aspirin  chewable 81 milliGRAM(s) Oral daily  heparin  Injectable 5000 Unit(s) SubCutaneous every 8 hours  hydrALAZINE 50 milliGRAM(s) Oral every 8 hours  insulin lispro (HumaLOG) corrective regimen sliding scale   SubCutaneous every 6 hours  insulin NPH human recombinant 11 Unit(s) SubCutaneous every 6 hours  labetalol 200 milliGRAM(s) Oral every 8 hours  norepinephrine Infusion 0.1 MICROgram(s)/kG/Min IV Continuous <Continuous>  pantoprazole  Injectable 40 milliGRAM(s) IV Push every 12 hours  piperacillin/tazobactam IVPB. 3.375 Gram(s) IV Intermittent every 12 hours  polyethylene glycol 3350 17 Gram(s) Oral two times a day  senna 2 Tablet(s) Oral at bedtime    PRN Inpatient Medications  chlorhexidine 0.12% Liquid 15 milliLiter(s) Swish and Spit every 4 hours PRN  hydrALAZINE Injectable 10 milliGRAM(s) IV Push every 6 hours PRN  midazolam Injectable 2 milliGRAM(s) IV Push every 2 hours PRN      REVIEW OF SYSTEMS  --------------------------------------------------------------------------------  unable to obtain      VITALS/PHYSICAL EXAM  --------------------------------------------------------------------------------  T(C): 35.6 (02-06-18 @ 04:00), Max: 36.8 (02-05-18 @ 16:00)  HR: 69 (02-06-18 @ 08:22) (61 - 85)  BP: 106/51 (02-06-18 @ 07:15) (80/38 - 181/74)  RR: 24 (02-06-18 @ 07:15) (18 - 28)  SpO2: 100% (02-06-18 @ 08:22) (96% - 100%)  Wt(kg): --        02-05-18 @ 07:01  -  02-06-18 @ 07:00  --------------------------------------------------------  IN: 1936 mL / OUT: 1520 mL / NET: 416 mL      Physical Exam:  	Gen: intubated  	Pulm:  vent sounds+  	CV: RRR, S1S2; no rub  	Back: No spinal or CVA tenderness; no sacral edema  	Abd: +BS, soft, nontender/nondistended  	: No suprapubic tenderness  	UE: Warm,  no edema; no asterixis  	LE: Warm, no edema  	Psych: Normal affect and mood  	Skin: Warm      LABS/STUDIES  --------------------------------------------------------------------------------              8.9    6.7   >-----------<  213      [02-06-18 @ 00:08]              25.8     134  |  94  |  41  ----------------------------<  374      [02-06-18 @ 00:08]  3.5   |  26  |  2.61        Ca     8.6     [02-06-18 @ 00:08]      Mg     2.3     [02-06-18 @ 00:08]      Phos  3.6     [02-06-18 @ 00:08]    TPro  6.3  /  Alb  2.6  /  TBili  1.3  /  DBili  x   /  AST  43  /  ALT  569  /  AlkPhos  111  [02-06-18 @ 00:08]    PT/INR: PT 11.7 , INR 1.08       [02-06-18 @ 00:08]  PTT: 23.1       [02-06-18 @ 00:08]      Creatinine Trend:  SCr 2.61 [02-06 @ 00:08]  SCr 3.73 [02-04 @ 23:53]  SCr 2.67 [02-04 @ 06:53]  SCr 1.86 [02-03 @ 21:45]  SCr 3.44 [02-03 @ 09:34]    Urinalysis - [01-30-18 @ 12:52]      Color Yellow / Appearance SL Turbid / SG 1.025 / pH 6.0      Gluc 50 / Ketone Negative  / Bili Negative / Urobili Negative       Blood Trace / Protein 150 / Leuk Est Negative / Nitrite Negative      RBC 3-5 / WBC  / Hyaline  / Gran  / Sq Epi  / Non Sq Epi OCC / Bacteria       HbA1c 8.6      [01-31-18 @ 07:15]  Lipid: chol 149, , HDL 8, LDL Unable to calculate LDL Cholesterol --- Interpretive Comment (for adults 18 and over)  Optimal LDL Level may vary based on clinical situation  Below 70                  Ideal for people at very high risk of heart  disease  Below 100                Ideal for people at risk of heart disease  100 - 129                   [place]  130 - 159                   Borderline high  160 - 189                   High  190 and Above          Very high      [02-01-18 @ 15:34]    HBsAg Nonreact      [02-01-18 @ 22:49]  HCV 0.16, Nonreact      [02-01-18 @ 22:49]

## 2018-02-06 NOTE — PROGRESS NOTE ADULT - SUBJECTIVE AND OBJECTIVE BOX
Interval Events:    REVIEW OF SYSTEMS:  Constitutional: [ ] negative [ ] fevers [ ] chills [ ] weight loss [ ] weight gain  HEENT: [ ] negative [ ] dry eyes [ ] eye irritation [ ] postnasal drip [ ] nasal congestion  CV: [ ] negative  [ ] chest pain [ ] orthopnea [ ] palpitations [ ] murmur  Resp: [ ] negative [ ] cough [ ] shortness of breath [ ] dyspnea [ ] wheezing [ ] sputum [ ] hemoptysis  GI: [ ] negative [ ] nausea [ ] vomiting [ ] diarrhea [ ] constipation [ ] abd pain [ ] dysphagia   : [ ] negative [ ] dysuria [ ] nocturia [ ] hematuria [ ] increased urinary frequency  Musculoskeletal: [ ] negative [ ] back pain [ ] myalgias [ ] arthralgias [ ] fracture  Skin: [ ] negative [ ] rash [ ] itch  Neurological: [ ] negative [ ] headache [ ] dizziness [ ] syncope [ ] weakness [ ] numbness  Psychiatric: [ ] negative [ ] anxiety [ ] depression  Endocrine: [ ] negative [ ] diabetes [ ] thyroid problem  Hematologic/Lymphatic: [ ] negative [ ] anemia [ ] bleeding problem  Allergic/Immunologic: [ ] negative [ ] itchy eyes [ ] nasal discharge [ ] hives [ ] angioedema  [ ] All other systems negative  [ ] Unable to assess ROS because ________    OBJECTIVE:  ICU Vital Signs Last 24 Hrs  T(C): 35.6 (06 Feb 2018 04:00), Max: 36.8 (05 Feb 2018 16:00)  T(F): 96.1 (06 Feb 2018 04:00), Max: 98.3 (05 Feb 2018 16:00)  HR: 74 (06 Feb 2018 07:15) (61 - 85)  BP: 106/51 (06 Feb 2018 07:15) (80/38 - 181/74)  BP(mean): 74 (06 Feb 2018 07:15) (55 - 106)  ABP: --  ABP(mean): --  RR: 24 (06 Feb 2018 07:15) (18 - 28)  SpO2: 99% (06 Feb 2018 07:15) (96% - 100%)    Mode: CPAP with PS, FiO2: 30, PEEP: 5, MAP: 8, PIP: 20    02-05 @ 07:01  -  02-06 @ 07:00  --------------------------------------------------------  IN: 1936 mL / OUT: 1520 mL / NET: 416 mL      CAPILLARY BLOOD GLUCOSE  427 (06 Feb 2018 06:00)      POCT Blood Glucose.: 427 mg/dL (06 Feb 2018 06:05)      PHYSICAL EXAM:  General:   HEENT:   Lymph Nodes:  Neck:   Respiratory:   Cardiovascular:   Abdomen:   Extremities:   Skin:   Neurological:  Psychiatry:    LINES:    HOSPITAL MEDICATIONS:  Standing Meds:  ALBUTerol/ipratropium for Nebulization 3 milliLiter(s) Nebulizer every 6 hours  aspirin  chewable 81 milliGRAM(s) Oral daily  heparin  Injectable 5000 Unit(s) SubCutaneous every 8 hours  hydrALAZINE 50 milliGRAM(s) Oral every 8 hours  insulin lispro (HumaLOG) corrective regimen sliding scale   SubCutaneous every 6 hours  insulin NPH human recombinant 11 Unit(s) SubCutaneous every 6 hours  labetalol 200 milliGRAM(s) Oral every 8 hours  norepinephrine Infusion 0.1 MICROgram(s)/kG/Min IV Continuous <Continuous>  pantoprazole  Injectable 40 milliGRAM(s) IV Push every 12 hours  piperacillin/tazobactam IVPB. 3.375 Gram(s) IV Intermittent every 12 hours  polyethylene glycol 3350 17 Gram(s) Oral two times a day  senna 2 Tablet(s) Oral at bedtime      PRN Meds:  chlorhexidine 0.12% Liquid 15 milliLiter(s) Swish and Spit every 4 hours PRN  hydrALAZINE Injectable 10 milliGRAM(s) IV Push every 6 hours PRN  midazolam Injectable 2 milliGRAM(s) IV Push every 2 hours PRN      LABS:                        8.9    6.7   )-----------( 213      ( 06 Feb 2018 00:08 )             25.8     Hgb Trend: 8.9<--, 8.4<--, 8.7<--, 9.1<--, 9.2<--  02-06    134<L>  |  94<L>  |  41<H>  ----------------------------<  374<H>  3.5   |  26  |  2.61<H>    Ca    8.6      06 Feb 2018 00:08  Phos  3.6     02-06  Mg     2.3     02-06    TPro  6.3  /  Alb  2.6<L>  /  TBili  1.3<H>  /  DBili  x   /  AST  43<H>  /  ALT  569<H>  /  AlkPhos  111  02-06    Creatinine Trend: 2.61<--, 3.73<--, 2.67<--, 1.86<--, 3.44<--, 2.92<--  PT/INR - ( 06 Feb 2018 00:08 )   PT: 11.7 sec;   INR: 1.08 ratio         PTT - ( 06 Feb 2018 00:08 )  PTT:23.1 sec          MICROBIOLOGY:     Culture - Blood (collected 03 Feb 2018 15:19)  Source: .Blood Blood-Peripheral  Preliminary Report (04 Feb 2018 16:01):    No growth to date.    Culture - Sputum (collected 03 Feb 2018 15:06)  Source: .Sputum Sputum  Gram Stain (03 Feb 2018 23:07):    No polymorphonuclear leukocytes per low power field    No Squamous epithelial cells per low power field    Rare Gram Variable Rods per oil power field  Final Report (05 Feb 2018 14:42):    Normal Respiratory Cate present    Culture - Blood (collected 03 Feb 2018 14:56)  Source: .Blood Blood-Venous  Preliminary Report (04 Feb 2018 15:01):    No growth to date.      RADIOLOGY:  [ ] Reviewed and interpreted by me    EKG: Interval Events: Follow up x-rays showed increased pneumothorax of L lung. MRI delayed until improvement of L pneumothorax. Chest tube readjusted at 08:00. Patient was hypotensive the evening of 2/5 requiring pressor support overnight stopped at 06:15.    REVIEW OF SYSTEMS:  Constitutional: [ ] negative [ ] fevers [ ] chills [ ] weight loss [ ] weight gain  HEENT: [ ] negative [ ] dry eyes [ ] eye irritation [ ] postnasal drip [ ] nasal congestion  CV: [ ] negative  [ ] chest pain [ ] orthopnea [ ] palpitations [ ] murmur  Resp: [ ] negative [ ] cough [ ] shortness of breath [ ] dyspnea [ ] wheezing [ ] sputum [ ] hemoptysis  GI: [ ] negative [ ] nausea [ ] vomiting [ ] diarrhea [ ] constipation [ ] abd pain [ ] dysphagia   : [ ] negative [ ] dysuria [ ] nocturia [ ] hematuria [ ] increased urinary frequency  Musculoskeletal: [ ] negative [ ] back pain [ ] myalgias [ ] arthralgias [ ] fracture  Skin: [ ] negative [ ] rash [ ] itch  Neurological: [ ] negative [ ] headache [ ] dizziness [ ] syncope [ ] weakness [ ] numbness  Psychiatric: [ ] negative [ ] anxiety [ ] depression  Endocrine: [ ] negative [ ] diabetes [ ] thyroid problem  Hematologic/Lymphatic: [ ] negative [ ] anemia [ ] bleeding problem  Allergic/Immunologic: [ ] negative [ ] itchy eyes [ ] nasal discharge [ ] hives [ ] angioedema  [ ] All other systems negative  [X] Unable to assess ROS because: Intubation and Altered Mental Status    OBJECTIVE:  ICU Vital Signs Last 24 Hrs  T(C): 35.6 (06 Feb 2018 04:00), Max: 36.8 (05 Feb 2018 16:00)  T(F): 96.1 (06 Feb 2018 04:00), Max: 98.3 (05 Feb 2018 16:00)  HR: 74 (06 Feb 2018 07:15) (61 - 85)  BP: 106/51 (06 Feb 2018 07:15) (80/38 - 181/74)  BP(mean): 74 (06 Feb 2018 07:15) (55 - 106)  ABP: --  ABP(mean): --  RR: 24 (06 Feb 2018 07:15) (18 - 28)  SpO2: 99% (06 Feb 2018 07:15) (96% - 100%)    Mode: CPAP with PS, FiO2: 30, PEEP: 5, MAP: 8, PIP: 20    02-05 @ 07:01  -  02-06 @ 07:00  --------------------------------------------------------  IN: 1936 mL / OUT: 1520 mL / NET: 416 mL      CAPILLARY BLOOD GLUCOSE  427 (06 Feb 2018 06:00)      POCT Blood Glucose.: 427 mg/dL (06 Feb 2018 06:05)      PHYSICAL EXAM:  General: Patient appears lethargic. Responds to voice and made attempt to look at examiner  HEENT: Eyes: PERRLA, EOMI decreased with lack of focus Mouth: Intubated with endotracheal and enteric tube in place. Normal mucosa.   Lymph Nodes:   Neck: Decreased subcutaneous emphysema and crepitus noted. Patient attempted to rotate neck without success  Respiratory: Decreased to absent breath sounds on L lung exam. Normal breath sounds on R lung exam. No rales, wheezing, or rhonchi on the R lung  Cardiovascular: Normal S1, S2. No murmurs, rubs, or gallops  Abdomen:   Extremities:   Skin:   Neurological:  Psychiatry:    LINES:    HOSPITAL MEDICATIONS:  Standing Meds:  ALBUTerol/ipratropium for Nebulization 3 milliLiter(s) Nebulizer every 6 hours  aspirin  chewable 81 milliGRAM(s) Oral daily  heparin  Injectable 5000 Unit(s) SubCutaneous every 8 hours  hydrALAZINE 50 milliGRAM(s) Oral every 8 hours  insulin lispro (HumaLOG) corrective regimen sliding scale   SubCutaneous every 6 hours  insulin NPH human recombinant 11 Unit(s) SubCutaneous every 6 hours  labetalol 200 milliGRAM(s) Oral every 8 hours  norepinephrine Infusion 0.1 MICROgram(s)/kG/Min IV Continuous <Continuous>  pantoprazole  Injectable 40 milliGRAM(s) IV Push every 12 hours  piperacillin/tazobactam IVPB. 3.375 Gram(s) IV Intermittent every 12 hours  polyethylene glycol 3350 17 Gram(s) Oral two times a day  senna 2 Tablet(s) Oral at bedtime      PRN Meds:  chlorhexidine 0.12% Liquid 15 milliLiter(s) Swish and Spit every 4 hours PRN  hydrALAZINE Injectable 10 milliGRAM(s) IV Push every 6 hours PRN  midazolam Injectable 2 milliGRAM(s) IV Push every 2 hours PRN      LABS:                        8.9    6.7   )-----------( 213      ( 06 Feb 2018 00:08 )             25.8     Hgb Trend: 8.9<--, 8.4<--, 8.7<--, 9.1<--, 9.2<--  02-06    134<L>  |  94<L>  |  41<H>  ----------------------------<  374<H>  3.5   |  26  |  2.61<H>    Ca    8.6      06 Feb 2018 00:08  Phos  3.6     02-06  Mg     2.3     02-06    TPro  6.3  /  Alb  2.6<L>  /  TBili  1.3<H>  /  DBili  x   /  AST  43<H>  /  ALT  569<H>  /  AlkPhos  111  02-06    Creatinine Trend: 2.61<--, 3.73<--, 2.67<--, 1.86<--, 3.44<--, 2.92<--  PT/INR - ( 06 Feb 2018 00:08 )   PT: 11.7 sec;   INR: 1.08 ratio         PTT - ( 06 Feb 2018 00:08 )  PTT:23.1 sec          MICROBIOLOGY:     Culture - Blood (collected 03 Feb 2018 15:19)  Source: .Blood Blood-Peripheral  Preliminary Report (04 Feb 2018 16:01):    No growth to date.    Culture - Sputum (collected 03 Feb 2018 15:06)  Source: .Sputum Sputum  Gram Stain (03 Feb 2018 23:07):    No polymorphonuclear leukocytes per low power field    No Squamous epithelial cells per low power field    Rare Gram Variable Rods per oil power field  Final Report (05 Feb 2018 14:42):    Normal Respiratory Cate present    Culture - Blood (collected 03 Feb 2018 14:56)  Source: .Blood Blood-Venous  Preliminary Report (04 Feb 2018 15:01):    No growth to date.      RADIOLOGY:  [ ] Reviewed and interpreted by me    EKG: Interval Events: Follow up x-rays showed increased pneumothorax of L lung. MRI delayed until improvement of L pneumothorax. Chest tube readjusted at 08:00. Patient was hypotensive the evening of 2/5 requiring pressor support overnight stopped at 06:15.    REVIEW OF SYSTEMS:  Constitutional: [ ] negative [ ] fevers [ ] chills [ ] weight loss [ ] weight gain  HEENT: [ ] negative [ ] dry eyes [ ] eye irritation [ ] postnasal drip [ ] nasal congestion  CV: [ ] negative  [ ] chest pain [ ] orthopnea [ ] palpitations [ ] murmur  Resp: [ ] negative [ ] cough [ ] shortness of breath [ ] dyspnea [ ] wheezing [ ] sputum [ ] hemoptysis  GI: [ ] negative [ ] nausea [ ] vomiting [ ] diarrhea [ ] constipation [ ] abd pain [ ] dysphagia   : [ ] negative [ ] dysuria [ ] nocturia [ ] hematuria [ ] increased urinary frequency  Musculoskeletal: [ ] negative [ ] back pain [ ] myalgias [ ] arthralgias [ ] fracture  Skin: [ ] negative [ ] rash [ ] itch  Neurological: [ ] negative [ ] headache [ ] dizziness [ ] syncope [ ] weakness [ ] numbness  Psychiatric: [ ] negative [ ] anxiety [ ] depression  Endocrine: [ ] negative [ ] diabetes [ ] thyroid problem  Hematologic/Lymphatic: [ ] negative [ ] anemia [ ] bleeding problem  Allergic/Immunologic: [ ] negative [ ] itchy eyes [ ] nasal discharge [ ] hives [ ] angioedema  [ ] All other systems negative  [X] Unable to assess ROS because: Intubation and Altered Mental Status    OBJECTIVE:  ICU Vital Signs Last 24 Hrs  T(C): 35.6 (06 Feb 2018 04:00), Max: 36.8 (05 Feb 2018 16:00)  T(F): 96.1 (06 Feb 2018 04:00), Max: 98.3 (05 Feb 2018 16:00)  HR: 74 (06 Feb 2018 07:15) (61 - 85)  BP: 106/51 (06 Feb 2018 07:15) (80/38 - 181/74)  BP(mean): 74 (06 Feb 2018 07:15) (55 - 106)  ABP: --  ABP(mean): --  RR: 24 (06 Feb 2018 07:15) (18 - 28)  SpO2: 99% (06 Feb 2018 07:15) (96% - 100%)    Mode: CPAP with PS, FiO2: 30, PEEP: 5, MAP: 8, PIP: 20    02-05 @ 07:01  -  02-06 @ 07:00  --------------------------------------------------------  IN: 1936 mL / OUT: 1520 mL / NET: 416 mL      CAPILLARY BLOOD GLUCOSE  427 (06 Feb 2018 06:00)      POCT Blood Glucose.: 427 mg/dL (06 Feb 2018 06:05)      PHYSICAL EXAM:  General: Patient appears lethargic. Responds to voice and made attempt to look at examiner  HEENT: Eyes: PERRLA, EOMI decreased with lack of focus Mouth: Intubated with endotracheal and enteric tube in place. Normal mucosa.   Lymph Nodes:   Neck: Decreased subcutaneous emphysema and crepitus noted. Patient attempted to rotate neck without success  Respiratory: Decreased to absent breath sounds on L lung exam. Normal breath sounds on R lung exam. No rales, wheezing, or rhonchi on the R lung  Cardiovascular: Normal S1, S2. No murmurs, rubs, or gallops  Abdomen: soft, mildly distended  Extremities: no clubbing, cyanosis, edema  Skin: R shiley catheter  Neurological: cannot obey commands  Psychiatry: alert and orientedx0    LINES:    HOSPITAL MEDICATIONS:  Standing Meds:  ALBUTerol/ipratropium for Nebulization 3 milliLiter(s) Nebulizer every 6 hours  aspirin  chewable 81 milliGRAM(s) Oral daily  heparin  Injectable 5000 Unit(s) SubCutaneous every 8 hours  hydrALAZINE 50 milliGRAM(s) Oral every 8 hours  insulin lispro (HumaLOG) corrective regimen sliding scale   SubCutaneous every 6 hours  insulin NPH human recombinant 11 Unit(s) SubCutaneous every 6 hours  labetalol 200 milliGRAM(s) Oral every 8 hours  norepinephrine Infusion 0.1 MICROgram(s)/kG/Min IV Continuous <Continuous>  pantoprazole  Injectable 40 milliGRAM(s) IV Push every 12 hours  piperacillin/tazobactam IVPB. 3.375 Gram(s) IV Intermittent every 12 hours  polyethylene glycol 3350 17 Gram(s) Oral two times a day  senna 2 Tablet(s) Oral at bedtime      PRN Meds:  chlorhexidine 0.12% Liquid 15 milliLiter(s) Swish and Spit every 4 hours PRN  hydrALAZINE Injectable 10 milliGRAM(s) IV Push every 6 hours PRN  midazolam Injectable 2 milliGRAM(s) IV Push every 2 hours PRN      LABS:                        8.9    6.7   )-----------( 213      ( 06 Feb 2018 00:08 )             25.8     Hgb Trend: 8.9<--, 8.4<--, 8.7<--, 9.1<--, 9.2<--  02-06    134<L>  |  94<L>  |  41<H>  ----------------------------<  374<H>  3.5   |  26  |  2.61<H>    Ca    8.6      06 Feb 2018 00:08  Phos  3.6     02-06  Mg     2.3     02-06    TPro  6.3  /  Alb  2.6<L>  /  TBili  1.3<H>  /  DBili  x   /  AST  43<H>  /  ALT  569<H>  /  AlkPhos  111  02-06    Creatinine Trend: 2.61<--, 3.73<--, 2.67<--, 1.86<--, 3.44<--, 2.92<--  PT/INR - ( 06 Feb 2018 00:08 )   PT: 11.7 sec;   INR: 1.08 ratio         PTT - ( 06 Feb 2018 00:08 )  PTT:23.1 sec          MICROBIOLOGY:     Culture - Blood (collected 03 Feb 2018 15:19)  Source: .Blood Blood-Peripheral  Preliminary Report (04 Feb 2018 16:01):    No growth to date.    Culture - Sputum (collected 03 Feb 2018 15:06)  Source: .Sputum Sputum  Gram Stain (03 Feb 2018 23:07):    No polymorphonuclear leukocytes per low power field    No Squamous epithelial cells per low power field    Rare Gram Variable Rods per oil power field  Final Report (05 Feb 2018 14:42):    Normal Respiratory Cate present    Culture - Blood (collected 03 Feb 2018 14:56)  Source: .Blood Blood-Venous  Preliminary Report (04 Feb 2018 15:01):    No growth to date.      RADIOLOGY:  [ ] Reviewed and interpreted by me    EKG:

## 2018-02-06 NOTE — PROGRESS NOTE ADULT - ATTENDING COMMENTS
67F PMH HTN, DM2 (on insulin), breast ca on trastuzumab, s/p b/l mastectomy came to [facility] with Influenza A. She went into acute hypoxic resp failure in the ED requiring intubation (initial esophageal intubation - thereafter corrected to tracheal intubation). Subsequently, she went into PEA cardiac arrest, successfully resuscitated after 5 mins. She then went into PEA arrest again and required 30 mins of CPR before getting ROSC. She received tPA for suspected PE and had 2 chest tubes and a peritoneal drain placed during/after CPR. Course further complicated by septic/cardiogenic shock, RUSS/ATN, shock liver, pneumonia, left pneumothorax and pneumoperitoneum (no intraabdominal viscus perforation is found). 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 She has uncontrolled hyperglycemia now.     follows simple commands  does not move LUE and RLE  check MRI brain to assess extent of CVA (per Neuro)  continue aspirin  not sedated  BP is stable, continue hydralazine, labetalol  cardiac fn normal on cardiac MRI  on PSV 15/5 however Vt 300-400s, did not tolerate PSV 10/5 on 2/5, will attempt again after MRI brain  persistent left ptx due to malfunctioning chest tube - replaced w/o difficulty, CXR with resolution of ptx, continue suction  rt lung opacities improving, continue zosyn, finished Tamiflu, off vanc  she will need evaluation for esophageal injury once extubated   tolerating TF at goal  continue bowel regimen  LFTs improving, not on statin  dialysis per renal  hypokalemia - replace  avoid hepatotoxic, nephrotoxic meds  increase NPH and ISS   protonix, sc heparin for ppx  50 mins critical care time spent

## 2018-02-06 NOTE — PROGRESS NOTE ADULT - ASSESSMENT
67F w PMH of bilateral Breast CA (on Trastuzumab, last dose Jan 20th) admitted to MICU with influenza PNA c/b PEA arrest x 2 with ROSC x 2 (10 min + 30 min). CPR c/b bilateral PTX (s/p bilateral chest tubes), pneumoperitoneum (s/p Peritoneal drain (pigtail) in right hemiabdomen and paracentesis decompression) a/w extensive subcutaneous emphysema. Pt received 100 mg of empiric TPA in ED. Surgery consulted and not recommending surgical intervention.   All 3 chest tubes removed on 1/31. Pt had cardiac MRI on 2/1 which was wnl. Right sided IJ removed and right shiley placed and HD initiated on 2/1. Abdominal drain removed on 2/1. AM chest xray on 2/2 showed enlargement of previously noted left PTX.  Chest XRAY 12/5 no pneumothorax.     Neurologic:  Alteration of mental status present. Likely due possible structural anoxic brain injury due to prolonged PEA arrest x2 + CTH after TPA showing evidence of new small acute left PCA territory infarct without hemorrhagic conversion. Repeat CTH after 24 hours and on 2/3 showing no acute changes.   Will pursue MRI head if pts condition improves    Skin:  Lines: Right IJ Shiley  Decubiti: None    GI:  Diet: trickle feeds  Pt with persistent pneumoperitoneum seen on xray on 2/3. potentially secondary to pneumothorax communication. Imaging showing no evidence of viscous perforation.   Surgery and CT surgery not recommending any intervention.   GI stress prophylaxis indicated pantoprazole 40IV daily    Metabolic:  BMP showing evidence of RUSS 2/2 ATN 2/2 PEA arrest.   still anuric cr trending upward- plan for HD today  Patient with evidence of shock liver 2/2 to prolonged PEA arrest. ALT and AST were both in the thousands, now trending down     Volume Assessment:  No peripheral edema  No EVLW on US  No evidence of disturbance of effective circulating volume on US    Hematologic:  DVT prophylaxis with HSQ             Infectious Disease:  s/p oseltamivir for influenza  c/w pip tazo 6/7 for possible aspiration PNA vs micro perf    Hemodynamics:  Patient is not on pressors.    Cardiovascular:.  Cardiac MRI unremarkable. Shows normal RV and LV function.       Respiratory:  Pt remains intubated on low FIO2 requirement with PEEP of 5. CXR 2/5- pneumothorax resolved 67F w PMH of bilateral Breast CA (on Trastuzumab, last dose Jan 20th) admitted to MICU with influenza PNA c/b PEA arrest x 2 with ROSC x 2 (10 min + 30 min). CPR c/b bilateral PTX (s/p bilateral chest tubes), pneumoperitoneum (s/p Peritoneal drain (pigtail) in right hemiabdomen and paracentesis decompression) a/w extensive subcutaneous emphysema. Pt received 100 mg of empiric TPA in ED. Surgery consulted and not recommending surgical intervention.   All 3 chest tubes removed on 1/31. Pt had cardiac MRI on 2/1 which was wnl. Right sided IJ removed and right shiley placed and HD initiated on 2/1. Abdominal drain removed on 2/1. AM chest xray on 2/2 showed enlargement of previously noted left PTX.  Patients pneumothorax initially resolved on CXR, but has returned.     Neurologic:  Alteration of mental status present. Likely due possible structural anoxic brain injury due to prolonged PEA arrest x2 + CTH after TPA showing evidence of new small acute left PCA territory infarct without hemorrhagic conversion. Repeat CTH after 24 hours and on 2/3 showing no acute changes.   MRI of head    Skin:  Lines: Right IJ Shiley  Decubiti: None    GI:  Diet: trickle feeds  Pt with persistent pneumoperitoneum seen on xray on 2/3. potentially secondary to pneumothorax communication. Imaging showing no evidence of viscous perforation.   GI stress prophylaxis indicated pantoprazole 40IV daily    Metabolic:  BMP showing evidence of RUSS 2/2 ATN 2/2 PEA arrest.   still anuric- s/p HD yesterday- no HD planned for today  Patient with evidence of shock liver 2/2 to prolonged PEA arrest. ALT and AST were both in the thousands, now trending down     Volume Assessment:  No peripheral edema    Hematologic:  DVT prophylaxis with HSQ     Infectious Disease:  s/p oseltamivir for influenza  c/w Zosyn 7/7 for possible aspiration PNA vs micro perf    Hemodynamics:  Was on pressors ON, Patient is not currently on pressors.    Cardiovascular:.  Cardiac MRI unremarkable. Shows normal RV and LV function.     Respiratory:  pneumothorax has returned, chest tube previously was out of place, but appears in place on most recent XRAY  now on PEEP of 15  CT surgery consult- f/u recs

## 2018-02-06 NOTE — CHART NOTE - NSCHARTNOTEFT_GEN_A_CORE
Interim Note    Pt discussed on rounds, BG high, insulin adjusted, K 3.5 (required supplementation yesterday), phos WDL.  Recommend changing tube feeds to Vital 1.2 (lower carb formula) goal 80cc/hr x 18 hrs to provide 1720 kcals, 108 gm protein, 1168cc free water for 24 kcals/kg, 1.5 gm protein/kg assessment wt of 72.1kg. Will f/u on tube feeding tolerance.

## 2018-02-06 NOTE — PROCEDURE NOTE - PROCEDURE
<<-----Click on this checkbox to enter Procedure Chest tube insertion  02/06/2018    Active  [id number]

## 2018-02-06 NOTE — PROCEDURE NOTE - NSPOSTCAREGUIDE_GEN_A_CORE
Verbal/written post procedure instructions were given to patient/caregiver Verbal/written post procedure instructions were given to patient/caregiver/Care for catheter as per unit/ICU protocols

## 2018-02-07 DIAGNOSIS — M81.0 AGE-RELATED OSTEOPOROSIS WITHOUT CURRENT PATHOLOGICAL FRACTURE: ICD-10-CM

## 2018-02-07 DIAGNOSIS — I10 ESSENTIAL (PRIMARY) HYPERTENSION: ICD-10-CM

## 2018-02-07 DIAGNOSIS — E11.21 TYPE 2 DIABETES MELLITUS WITH DIABETIC NEPHROPATHY: ICD-10-CM

## 2018-02-07 LAB
ALBUMIN SERPL ELPH-MCNC: 2.4 G/DL — LOW (ref 3.3–5)
ALP SERPL-CCNC: 96 U/L — SIGNIFICANT CHANGE UP (ref 40–120)
ALT FLD-CCNC: 348 U/L RC — HIGH (ref 10–45)
ANION GAP SERPL CALC-SCNC: 15 MMOL/L — SIGNIFICANT CHANGE UP (ref 5–17)
APTT BLD: 26.1 SEC — LOW (ref 27.5–37.4)
AST SERPL-CCNC: 31 U/L — SIGNIFICANT CHANGE UP (ref 10–40)
BILIRUB SERPL-MCNC: 1 MG/DL — SIGNIFICANT CHANGE UP (ref 0.2–1.2)
BUN SERPL-MCNC: 60 MG/DL — HIGH (ref 7–23)
CALCIUM SERPL-MCNC: 8.7 MG/DL — SIGNIFICANT CHANGE UP (ref 8.4–10.5)
CHLORIDE SERPL-SCNC: 99 MMOL/L — SIGNIFICANT CHANGE UP (ref 96–108)
CHOLEST SERPL-MCNC: 183 MG/DL — SIGNIFICANT CHANGE UP (ref 10–199)
CO2 SERPL-SCNC: 25 MMOL/L — SIGNIFICANT CHANGE UP (ref 22–31)
CREAT SERPL-MCNC: 3.78 MG/DL — HIGH (ref 0.5–1.3)
GLUCOSE BLDC GLUCOMTR-MCNC: 157 MG/DL — HIGH (ref 70–99)
GLUCOSE BLDC GLUCOMTR-MCNC: 169 MG/DL — HIGH (ref 70–99)
GLUCOSE BLDC GLUCOMTR-MCNC: 182 MG/DL — HIGH (ref 70–99)
GLUCOSE BLDC GLUCOMTR-MCNC: 311 MG/DL — HIGH (ref 70–99)
GLUCOSE BLDC GLUCOMTR-MCNC: 326 MG/DL — HIGH (ref 70–99)
GLUCOSE SERPL-MCNC: 120 MG/DL — HIGH (ref 70–99)
HCT VFR BLD CALC: 24.3 % — LOW (ref 34.5–45)
HGB BLD-MCNC: 8.5 G/DL — LOW (ref 11.5–15.5)
INR BLD: 1.01 RATIO — SIGNIFICANT CHANGE UP (ref 0.88–1.16)
MAGNESIUM SERPL-MCNC: 2.4 MG/DL — SIGNIFICANT CHANGE UP (ref 1.6–2.6)
MCHC RBC-ENTMCNC: 31.1 PG — SIGNIFICANT CHANGE UP (ref 27–34)
MCHC RBC-ENTMCNC: 35 GM/DL — SIGNIFICANT CHANGE UP (ref 32–36)
MCV RBC AUTO: 89.1 FL — SIGNIFICANT CHANGE UP (ref 80–100)
PHOSPHATE SERPL-MCNC: 4.3 MG/DL — SIGNIFICANT CHANGE UP (ref 2.5–4.5)
PLATELET # BLD AUTO: 207 K/UL — SIGNIFICANT CHANGE UP (ref 150–400)
POTASSIUM SERPL-MCNC: 3.7 MMOL/L — SIGNIFICANT CHANGE UP (ref 3.5–5.3)
POTASSIUM SERPL-SCNC: 3.7 MMOL/L — SIGNIFICANT CHANGE UP (ref 3.5–5.3)
PROT SERPL-MCNC: 5.9 G/DL — LOW (ref 6–8.3)
PROTHROM AB SERPL-ACNC: 11 SEC — SIGNIFICANT CHANGE UP (ref 9.8–12.7)
RBC # BLD: 2.73 M/UL — LOW (ref 3.8–5.2)
RBC # FLD: 13.2 % — SIGNIFICANT CHANGE UP (ref 10.3–14.5)
SODIUM SERPL-SCNC: 139 MMOL/L — SIGNIFICANT CHANGE UP (ref 135–145)
TRIGL SERPL-MCNC: 313 MG/DL — HIGH (ref 10–149)
VANCOMYCIN FLD-MCNC: 14 UG/ML — SIGNIFICANT CHANGE UP
WBC # BLD: 9.2 K/UL — SIGNIFICANT CHANGE UP (ref 3.8–10.5)
WBC # FLD AUTO: 9.2 K/UL — SIGNIFICANT CHANGE UP (ref 3.8–10.5)

## 2018-02-07 PROCEDURE — 93306 TTE W/DOPPLER COMPLETE: CPT | Mod: 26

## 2018-02-07 PROCEDURE — 99291 CRITICAL CARE FIRST HOUR: CPT

## 2018-02-07 PROCEDURE — 99232 SBSQ HOSP IP/OBS MODERATE 35: CPT | Mod: GC

## 2018-02-07 PROCEDURE — 99223 1ST HOSP IP/OBS HIGH 75: CPT | Mod: GC

## 2018-02-07 RX ORDER — INSULIN GLARGINE 100 [IU]/ML
10 INJECTION, SOLUTION SUBCUTANEOUS AT BEDTIME
Qty: 0 | Refills: 0 | Status: DISCONTINUED | OUTPATIENT
Start: 2018-02-07 | End: 2018-02-07

## 2018-02-07 RX ORDER — HUMAN INSULIN 100 [IU]/ML
28 INJECTION, SUSPENSION SUBCUTANEOUS EVERY 6 HOURS
Qty: 0 | Refills: 0 | Status: DISCONTINUED | OUTPATIENT
Start: 2018-02-07 | End: 2018-02-08

## 2018-02-07 RX ORDER — INSULIN LISPRO 100/ML
VIAL (ML) SUBCUTANEOUS EVERY 6 HOURS
Qty: 0 | Refills: 0 | Status: DISCONTINUED | OUTPATIENT
Start: 2018-02-07 | End: 2018-02-21

## 2018-02-07 RX ADMIN — HEPARIN SODIUM 5000 UNIT(S): 5000 INJECTION INTRAVENOUS; SUBCUTANEOUS at 16:18

## 2018-02-07 RX ADMIN — Medication 81 MILLIGRAM(S): at 11:43

## 2018-02-07 RX ADMIN — Medication 3 MILLILITER(S): at 12:03

## 2018-02-07 RX ADMIN — PANTOPRAZOLE SODIUM 40 MILLIGRAM(S): 20 TABLET, DELAYED RELEASE ORAL at 11:43

## 2018-02-07 RX ADMIN — Medication 3 MILLILITER(S): at 23:41

## 2018-02-07 RX ADMIN — Medication 3 MILLILITER(S): at 05:41

## 2018-02-07 RX ADMIN — HUMAN INSULIN 18 UNIT(S): 100 INJECTION, SUSPENSION SUBCUTANEOUS at 17:18

## 2018-02-07 RX ADMIN — Medication 3 MILLILITER(S): at 17:47

## 2018-02-07 RX ADMIN — HEPARIN SODIUM 5000 UNIT(S): 5000 INJECTION INTRAVENOUS; SUBCUTANEOUS at 08:23

## 2018-02-07 RX ADMIN — PIPERACILLIN AND TAZOBACTAM 25 GRAM(S): 4; .5 INJECTION, POWDER, LYOPHILIZED, FOR SOLUTION INTRAVENOUS at 08:23

## 2018-02-07 RX ADMIN — Medication 16: at 05:58

## 2018-02-07 RX ADMIN — Medication 16: at 17:21

## 2018-02-07 RX ADMIN — Medication 4: at 11:42

## 2018-02-07 RX ADMIN — HUMAN INSULIN 18 UNIT(S): 100 INJECTION, SUSPENSION SUBCUTANEOUS at 11:42

## 2018-02-07 RX ADMIN — HUMAN INSULIN 18 UNIT(S): 100 INJECTION, SUSPENSION SUBCUTANEOUS at 05:57

## 2018-02-07 RX ADMIN — Medication 3 MILLILITER(S): at 00:19

## 2018-02-07 NOTE — PROGRESS NOTE ADULT - SUBJECTIVE AND OBJECTIVE BOX
[facility] DIVISION OF KIDNEY DISEASES AND HYPERTENSION -- FOLLOW UP NOTE  --------------------------------------------------------------------------------  Chief Complaint:    24 hour events/subjective:        PAST HISTORY  --------------------------------------------------------------------------------  No significant changes to PMH, PSH, FHx, SHx, unless otherwise noted    ALLERGIES & MEDICATIONS  --------------------------------------------------------------------------------  Allergies    NIFEdipine (Urticaria; Hives)  vitamin E (Short breath; Urticaria; Hives)    Intolerances      Standing Inpatient Medications  ALBUTerol/ipratropium for Nebulization 3 milliLiter(s) Nebulizer every 6 hours  aspirin  chewable 81 milliGRAM(s) Oral daily  heparin  Injectable 5000 Unit(s) SubCutaneous every 8 hours  insulin glargine Injectable (LANTUS) 10 Unit(s) SubCutaneous at bedtime  insulin lispro (HumaLOG) corrective regimen sliding scale   SubCutaneous every 6 hours  insulin NPH human recombinant 18 Unit(s) SubCutaneous every 6 hours  norepinephrine Infusion 0.1 MICROgram(s)/kG/Min IV Continuous <Continuous>  pantoprazole  Injectable 40 milliGRAM(s) IV Push every 12 hours  piperacillin/tazobactam IVPB. 3.375 Gram(s) IV Intermittent every 12 hours  polyethylene glycol 3350 17 Gram(s) Oral two times a day  senna 2 Tablet(s) Oral at bedtime    PRN Inpatient Medications  chlorhexidine 0.12% Liquid 15 milliLiter(s) Swish and Spit every 4 hours PRN      REVIEW OF SYSTEMS  --------------------------------------------------------------------------------  Gen: No weight changes, fatigue, fevers/chills, weakness  Skin: No rashes  Head/Eyes/Ears/Mouth: No headache; Normal hearing; Normal vision w/o blurriness; No sinus pain/discomfort, sore throat  Respiratory: No dyspnea, cough, wheezing, hemoptysis  CV: No chest pain, PND, orthopnea  GI: No abdominal pain, diarrhea, constipation, nausea, vomiting, melena, hematochezia  : No increased frequency, dysuria, hematuria, nocturia  MSK: No joint pain/swelling; no back pain; no edema  Neuro: No dizziness/lightheadedness, weakness, seizures, numbness, tingling  Heme: No easy bruising or bleeding  Endo: No heat/cold intolerance  Psych: No significant nervousness, anxiety, stress, depression    All other systems were reviewed and are negative, except as noted.    VITALS/PHYSICAL EXAM  --------------------------------------------------------------------------------  T(C): 37.2 (02-07-18 @ 04:00), Max: 37.7 (02-06-18 @ 20:00)  HR: 80 (02-07-18 @ 07:00) (60 - 81)  BP: 121/59 (02-07-18 @ 07:00) (84/39 - 151/65)  RR: 24 (02-07-18 @ 07:00) (18 - 100)  SpO2: 99% (02-07-18 @ 07:00) (96% - 100%)  Wt(kg): --        02-06-18 @ 07:01  -  02-07-18 @ 07:00  --------------------------------------------------------  IN: 1565 mL / OUT: 70 mL / NET: 1495 mL      Physical Exam:  	Gen: NAD, well-appearing  	HEENT: PERRL, supple neck, clear oropharynx  	Pulm: CTA B/L  	CV: RRR, S1S2; no rub  	Back: No spinal or CVA tenderness; no sacral edema  	Abd: +BS, soft, nontender/nondistended  	: No suprapubic tenderness  	UE: Warm, FROM, no clubbing, intact strength; no edema; no asterixis  	LE: Warm, FROM, no clubbing, intact strength; no edema  	Neuro: No focal deficits, intact gait  	Psych: Normal affect and mood  	Skin: Warm, without rashes  	Vascular access:    LABS/STUDIES  --------------------------------------------------------------------------------              8.5    9.2   >-----------<  207      [02-06-18 @ 23:53]              24.3     139  |  99  |  60  ----------------------------<  120      [02-06-18 @ 23:53]  3.7   |  25  |  3.78        Ca     8.7     [02-06-18 @ 23:53]      Mg     2.4     [02-06-18 @ 23:53]      Phos  4.3     [02-06-18 @ 23:53]    TPro  5.9  /  Alb  2.4  /  TBili  1.0  /  DBili  x   /  AST  31  /  ALT  348  /  AlkPhos  96  [02-06-18 @ 23:53]    PT/INR: PT 11.0 , INR 1.01       [02-06-18 @ 23:53]  PTT: 26.1       [02-06-18 @ 23:53]      Creatinine Trend:  SCr 3.78 [02-06 @ 23:53]  SCr 2.61 [02-06 @ 00:08]  SCr 3.73 [02-04 @ 23:53]  SCr 2.67 [02-04 @ 06:53]  SCr 1.86 [02-03 @ 21:45]    Urinalysis - [01-30-18 @ 12:52]      Color Yellow / Appearance SL Turbid / SG 1.025 / pH 6.0      Gluc 50 / Ketone Negative  / Bili Negative / Urobili Negative       Blood Trace / Protein 150 / Leuk Est Negative / Nitrite Negative      RBC 3-5 / WBC  / Hyaline  / Gran  / Sq Epi  / Non Sq Epi OCC / Bacteria       HbA1c 8.6      [01-31-18 @ 07:15]  Lipid: chol 149, , HDL 8, LDL Unable to calculate LDL Cholesterol --- Interpretive Comment (for adults 18 and over)  Optimal LDL Level may vary based on clinical situation  Below 70                  Ideal for people at very high risk of heart  disease  Below 100                Ideal for people at risk of heart disease  100 - 129                   [place]  130 - 159                   Borderline high  160 - 189                   High  190 and Above          Very high      [02-01-18 @ 15:34]    HBsAg Nonreact      [02-01-18 @ 22:49]  HCV 0.16, Nonreact      [02-01-18 @ 22:49]

## 2018-02-07 NOTE — PROGRESS NOTE ADULT - SUBJECTIVE AND OBJECTIVE BOX
SURGERY PROGRESS NOTE:    ===============================================  Acute Care Surgery and Trauma Surgery ([facility]) Pager [phone]  ===============================================    Subjective:  Pt remains intubated in MICU. Tolerating TF @ 80cc/hr. Having loose, nonbloody BMs. Has been off of pressors since the evening of the .      Objective:  PE:  Gen: Critically ill  Resp: Intubated  CVS: RRR  Abd: Soft, Obese, ND, NT, no rebound or guarding  Ext: No edema, WWP  Neuro: Sedated, but arousable    Vital Signs Last 24 Hrs  T(C): 37.4 (2018 16:00), Max: 37.7 (2018 20:00)  T(F): 99.3 (2018 16:00), Max: 99.8 (2018 20:00)  HR: 887 (2018 16:33) (60 - 887)  BP: 132/60 (2018 16:33) (93/47 - 156/60)  BP(mean): 87 (2018 16:33) (66 - 98)  RR: 22 (2018 16:33) (18 - 100)  SpO2: 99% (2018 16:33) (96% - 100%)    I&O's Detail    2018 07:01  -  2018 07:00  --------------------------------------------------------  IN:    Enteral Tube Flush: 160 mL    IV PiggyBack: 175 mL    Lactated Ringers IV Bolus: 500 mL    Nepro: 730 mL  Total IN: 1565 mL    OUT:    Chest Tube: 70 mL  Total OUT: 70 mL    Total NET: 1495 mL      2018 07:01  -  2018 16:35  --------------------------------------------------------  IN:    Enteral Tube Flush: 60 mL    IV PiggyBack: 100 mL    Vital HN: 480 mL  Total IN: 640 mL    OUT:  Total OUT: 0 mL    Total NET: 640 mL          Daily     Daily Weight in k (2018 04:00)    MEDICATIONS  (STANDING):  ALBUTerol/ipratropium for Nebulization 3 milliLiter(s) Nebulizer every 6 hours  aspirin  chewable 81 milliGRAM(s) Oral daily  heparin  Injectable 5000 Unit(s) SubCutaneous every 8 hours  insulin lispro (HumaLOG) corrective regimen sliding scale   SubCutaneous every 6 hours  insulin NPH human recombinant 18 Unit(s) SubCutaneous every 6 hours  norepinephrine Infusion 0.1 MICROgram(s)/kG/Min (14.025 mL/Hr) IV Continuous <Continuous>  pantoprazole  Injectable 40 milliGRAM(s) IV Push every 12 hours    MEDICATIONS  (PRN):  chlorhexidine 0.12% Liquid 15 milliLiter(s) Swish and Spit every 4 hours PRN mouth hygiene      LABS:                        8.5    9.2   )-----------( 207      ( 2018 23:53 )             24.3     02-06    139  |  99  |  60<H>  ----------------------------<  120<H>  3.7   |  25  |  3.78<H>    Ca    8.7      2018 23:53  Phos  4.3     02-06  Mg     2.4     02-06    TPro  5.9<L>  /  Alb  2.4<L>  /  TBili  1.0  /  DBili  x   /  AST  31  /  ALT  348<H>  /  AlkPhos  96  02-06    PT/INR - ( 2018 23:53 )   PT: 11.0 sec;   INR: 1.01 ratio         PTT - ( 2018 23:53 )  PTT:26.1 sec      RADIOLOGY & ADDITIONAL STUDIES:

## 2018-02-07 NOTE — PROGRESS NOTE ADULT - ASSESSMENT
ASSESSMENT: This is a 66 y/o woman w/ hx Breast Cancer (diagnosed in Feb 2017) currently on Herceptin, HTN, DM II on 70/30 p/w fever, RVP positive for flu on 1/30. PEA arrest with chest compressions for 10min, received ROSC after Epi x 2, intubated, requiring re-intubation s/p recurrent PEA w/ ROSC after 30 mins. On neurological exam, patient is awake with eyes open, following simple commands. LUE remains paretic, Moving RUE and b/l LE.  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 Stroke etiology unclear but presumably cardio embolism related to patients overall clinical course. Discussed findings and plan of care with MICU team as well as recommendation  to initiate administration of ASA for secondary stroke prevention.        Recommend:  - Please repeat lipid profile (last LDL was unable, HDL 8)  -  Lipitor 40 mg qhs unless otherwise contraindicated  - TTE w/ bubble study (previous study was limited)      NEURO: Continue close monitoring for neurologic deterioration, permissive HTN with gradual return to normotension, avoid hypotension. Titrate statin to LDL goal less than 70.   Physical therapy/OT/Speech eval/treatment.     ANTITHROMBOTIC THERAPY: ASA for secondary stroke prophylaxis.     PULMONARY: Orally intubated. Chest tubes to wall suction. Patient on CPAP, saturating well, with spontaneous respirations noted over setting.     CARDIOVASCULAR: check TTE with bubble, Continue  cardiac monitoring                              SBP goal: <200/105    GASTROINTESTINAL: Ulcer prophylaxis, dysphagia screen when extubated      Diet: Orogastric tube feeding in place, aspiration precautions maintained.    RENAL: BUN 41, CR 2.61 slightly improved, defer to primary MICU team/ Nephrology for management .     Na Goal: Greater than 135     Wheeler: in place     HEMATOLOGY: H/H remains 8.9/25.8, Platelets 213.      DVT ppx: Heparin s.c [x] LMWH []     ID: afebrile, no leukocytosis     DISPOSITION: Rehab or home depending on PT eval once stable and workup is complete      CORE MEASURES:        Admission NIHSS: 24      TPA: [x] YES [] NO  Given in setting of cardiac arrest     LDL/HDL: Please repeat. LDL unable, HDL 8     Depression Screen: pending     Statin Therapy: Pending unless contraindicated     Dysphagia Screen: [] PASS [] FAIL    : Pending,  Orogastric tube in place,      Smoking [] YES [x] NO      Afib [] YES [x] NO     Stroke Education [x] YES [] NO

## 2018-02-07 NOTE — PROGRESS NOTE ADULT - SUBJECTIVE AND OBJECTIVE BOX
THE PATIENT WAS SEEN AND EXAMINED BY ME WITH THE HOUSESTAFF AND STROKE TEAM DURING MORNING ROUNDS.   HPI:  66y/o F hx Breast Cancer (diagnosed in Feb 2017) currently on Herceptin (last dose Jan 20th), HTN, Diabetes on 70/30, presenting with Fever at home, tmax of 102 Day PTA, with whole body weakness. Patient also had decreased appetite . Patient's daughter states that she herself felt unwell and was going to bring both herself and her mother to the doctor's office, However, given pt had worsening weakness, daughter brought the patient to the ER. Patient is otherwise noted to be independent and lives at home with her . She is AOx4 at baseline.     In the ER, patient was found to have T max of 100.6, with initial vitals of 99.5, HR 82, /78, RR 18, O2 sat 93% on room air. She was given ceftriaxone x1, and given 2L normal saline bolus with Duoneb x 3. Patient was also found to be RVP positive for flu. Patient then went into PEA arrest with chest compressions for 10min, received Rosc after Epi x 2, and was intubated. Patient required re-intubation ( intubated initially into the esophagus) after she PEA arrested again, with ROSC achieved after 30min with Epi x 5. Patient was also found to have bilateral pneumothoraces, s/p 3 chest tubes, 2 on the right side, and 1 on the left. Patient also has paracentesis drain for decompression of the abdomen.     SUBJECTIVE: No events overnight.  No new neurologic complaints.      ALBUTerol/ipratropium for Nebulization 3 milliLiter(s) Nebulizer every 6 hours  aspirin  chewable 81 milliGRAM(s) Oral daily  chlorhexidine 0.12% Liquid 15 milliLiter(s) Swish and Spit every 4 hours PRN  heparin  Injectable 5000 Unit(s) SubCutaneous every 8 hours  insulin glargine Injectable (LANTUS) 10 Unit(s) SubCutaneous at bedtime  insulin lispro (HumaLOG) corrective regimen sliding scale   SubCutaneous every 6 hours  insulin NPH human recombinant 18 Unit(s) SubCutaneous every 6 hours  norepinephrine Infusion 0.1 MICROgram(s)/kG/Min IV Continuous <Continuous>  pantoprazole  Injectable 40 milliGRAM(s) IV Push every 12 hours      PHYSICAL EXAM:   Vital Signs Last 24 Hrs  T(C): 36.8 (07 Feb 2018 08:00), Max: 37.7 (06 Feb 2018 20:00)  T(F): 98.3 (07 Feb 2018 08:00), Max: 99.8 (06 Feb 2018 20:00)  HR: 80 (07 Feb 2018 11:00) (60 - 81)  BP: 122/57 (07 Feb 2018 11:00) (84/39 - 151/65)  BP(mean): 82 (07 Feb 2018 11:00) (56 - 98)  RR: 18 (07 Feb 2018 11:00) (18 - 100)  SpO2: 99% (07 Feb 2018 11:00) (96% - 100%)      ****Exam is limited as patient is orally intubated***.   General:  Elderly female. Orally intubated. Currently on CPAP with spontaneous respirations noted over setting. No acute distress  HEENT:  PERRL, does not track. + Oculocephalics.  Abdomen: Soft, nontender, nondistended . Orogastric tube in place.  Extremities: Edema noted to LUE.     NEUROLOGICAL EXAM:   Mental status: Awake, responsive,  eyes open, and  following commands.   Cranial Nerves: No discernible facial asymmetry, no nystagmus.   Motor exam: Decreased  tone. Left UE paresis, able to raise RUE and move both LE, bending at the knee. No Myoclonic activity noted, no posturing or tremors noted.  Sensation: Appears intact to light touch.   Coordination/ Gait: Unable to assess at this time.        LABS:                        8.5    9.2   )-----------( 207      ( 06 Feb 2018 23:53 )             24.3    02-06    139  |  99  |  60<H>  ----------------------------<  120<H>  3.7   |  25  |  3.78<H>    Ca    8.7      06 Feb 2018 23:53  Phos  4.3     02-06  Mg     2.4     02-06    TPro  5.9<L>  /  Alb  2.4<L>  /  TBili  1.0  /  DBili  x   /  AST  31  /  ALT  348<H>  /  AlkPhos  96  02-06  PT/INR - ( 06 Feb 2018 23:53 )   PT: 11.0 sec;   INR: 1.01 ratio         PTT - ( 06 Feb 2018 23:53 )  PTT:26.1 sec  Hemoglobin A1C, Whole Blood: 8.6 % (01-31 @ 07:15)  Hemoglobin A1C, Whole Blood: 8.7 % (01-31 @ 05:51)      IMAGING: Reviewed by me.    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  Acute left occipital lobe infarct.    The results of this examination were discussed with Yesenia from stroke   neurology at 8:44 AM on 2/7/2018    < end of copied text >

## 2018-02-07 NOTE — CONSULT NOTE ADULT - PROBLEM SELECTOR PROBLEM 1
Uncontrolled type 2 diabetes mellitus with diabetic nephropathy, with long-term current use of insulin

## 2018-02-07 NOTE — PROGRESS NOTE ADULT - PROBLEM SELECTOR PLAN 1
likely from ATN in setting of cardiac arrest. Pt continues to be oligo anuric  s/p HD with 800 ml UF on 02/05.  Currently with no e/o renal recovery with no urine output. Normal electrolytes.

## 2018-02-07 NOTE — CONSULT NOTE ADULT - SUBJECTIVE AND OBJECTIVE BOX
HPI:  66 y/o F hx Breast Cancer (diagnosed in Feb 2017) currently on Herceptin (last dose Jan 20th), HTN, DM2 on 70/30, presenting with Fever at home, tmax of 102 yesterday, with whole body weakness. Patient currently intubated in MICU for influenza PNA c/b PEA arrest x 2.    Endocrine History:  Patient was diagnosed with DM2 in the 1990's and has been on insulin since 2004. Her endocrinologist is Dr. Weiner. She is currently on Humulin 70/30 50 units before breakfast and 40 units before dinner, in addition to metformin 500 mg TID. Known complications include nephropathy and neuropathy. No retinopathy but does have cataracts. HbA1c here 8.6. Family states that recently, prior to becoming sick, patient did have FS in the 300-400 range. Multiple family members have DM2 as well.    Unable to obtain ROS from patient as she is intubated.    She is currently on tube feeds, Vital AF, at 80 cc/hr, 24 hour feeds. Tube feeds were held yesterday evening and this AM, feeds appear to have been restarted at 12 pm.    She also has osteoporosis and was being consider for treatment with Prolia as outpatient.    PAST MEDICAL & SURGICAL HISTORY:  Diabetes  Breast CA  H/O mastectomy, bilateral      FAMILY HISTORY:  DM2 in multiple family members      Social History:  No cigarette or alcohol use  Lives with family    Outpatient Medications:  · 	furosemide 20 mg oral tablet: 1 tab(s) orally once a day, Last Dose Taken:    · 	ramipril 10 mg oral capsule: 1 cap(s) orally 2 times a day, Last Dose Taken:    · 	HumuLIN 70/30 KwikPen 70 units-30 units/mL subcutaneous suspension:  50 units subcutaneous in the morning and 40 units subcutaneous in the evening, Last Dose Taken:    · 	aspirin 81 mg oral delayed release tablet: 1 tab(s) orally 3 to 4 times per week, Last Dose Taken:    · 	Magnesium Capsules: 1 cap(s) orally once a day, Last Dose Taken:    · 	Calcium: 1 tab(s) orally once a day, Last Dose Taken:    · 	carvedilol 12.5 mg oral tablet: 1 tab(s) orally once a day (at bedtime), Last Dose Taken:    · 	carvedilol 25 mg oral tablet: 1 tab(s) orally once a day (in the morning), Last Dose Taken:    · 	metFORMIN 500 mg oral tablet: 1 tab(s) orally 2 times a day, Last Dose Taken:    · 	atorvastatin 40 mg oral tablet: 1 tab(s) orally once a day, Last Dose Taken:    · 	letrozole 2.5 mg oral tablet: 1 tab(s) orally once a day, Last Dose Taken:      MEDICATIONS  (STANDING):  ALBUTerol/ipratropium for Nebulization 3 milliLiter(s) Nebulizer every 6 hours  aspirin  chewable 81 milliGRAM(s) Oral daily  heparin  Injectable 5000 Unit(s) SubCutaneous every 8 hours  insulin lispro (HumaLOG) corrective regimen sliding scale   SubCutaneous every 6 hours  insulin NPH human recombinant 18 Unit(s) SubCutaneous every 6 hours  norepinephrine Infusion 0.1 MICROgram(s)/kG/Min (14.025 mL/Hr) IV Continuous <Continuous>  pantoprazole  Injectable 40 milliGRAM(s) IV Push every 12 hours    MEDICATIONS  (PRN):  chlorhexidine 0.12% Liquid 15 milliLiter(s) Swish and Spit every 4 hours PRN mouth hygiene      Allergies    NIFEdipine (Urticaria; Hives)  vitamin E (Short breath; Urticaria; Hives)        Review of Systems:  UNABLE TO OBTAIN    PHYSICAL EXAM:  VITALS: T(C): 36.7 (02-07-18 @ 12:00)  T(F): 98 (02-07-18 @ 12:00), Max: 99.8 (02-06-18 @ 20:00)  HR: 88 (02-07-18 @ 15:00) (60 - 884)  BP: 112/53 (02-07-18 @ 15:00) (93/47 - 151/65)  RR:  (18 - 100)  SpO2:  (96% - 100%)  Wt(kg): --  GENERAL: NAD, well-developed  EYES: No proptosis, anicteric  HEENT:  Atraumatic, Normocephalic, ET tube in place  THYROID: no goiter  RESPIRATORY: scattered crackles, no wheezes  CARDIOVASCULAR: Regular rate and rhythm; No murmurs; no peripheral edema  GI: Soft, nontender, non distended, normal bowel sounds  SKIN: Dry, intact  MUSCULOSKELETAL: spontaneously moving right upper extremity  NEURO: unable to assess as patient is intubated  PSYCH: patient intubated        POCT Blood Glucose.: 169 mg/dL (02-07-18 @ 11:41) NPH 18, 4  POCT Blood Glucose.: 326 mg/dL (02-07-18 @ 05:52) NPH 18, 16  POCT Blood Glucose.: 157 mg/dL (02-07-18 @ 02:01)  POCT Blood Glucose.: 112 mg/dL (02-06-18 @ 23:26)  POCT Blood Glucose.: 309 mg/dL (02-06-18 @ 17:12) H 18, 8  POCT Blood Glucose.: 340 mg/dL (02-06-18 @ 11:06) H 18, 8  POCT Blood Glucose.: 427 mg/dL (02-06-18 @ 06:05)  POCT Blood Glucose.: 355 mg/dL (02-05-18 @ 23:44)  POCT Blood Glucose.: 203 mg/dL (02-05-18 @ 16:08)  POCT Blood Glucose.: 280 mg/dL (02-05-18 @ 10:20)  POCT Blood Glucose.: 347 mg/dL (02-05-18 @ 05:12)  POCT Blood Glucose.: 379 mg/dL (02-04-18 @ 23:28)  POCT Blood Glucose.: 362 mg/dL (02-04-18 @ 17:11)                          8.5    9.2   )-----------( 207      ( 06 Feb 2018 23:53 )             24.3       02-06    139  |  99  |  60<H>  ----------------------------<  120<H>  3.7   |  25  |  3.78<H>    EGFR if : 13<L>  EGFR if non : 12<L>    Ca    8.7      02-06  Mg     2.4     02-06  Phos  4.3     02-06    TPro  5.9<L>  /  Alb  2.4<L>  /  TBili  1.0  /  DBili  x   /  AST  31  /  ALT  348<H>  /  AlkPhos  96  02-06      Thyroid Function Tests:      Hemoglobin A1C, Whole Blood: 8.6 % <H> [4.0 - 5.6] (01-31-18 @ 07:15)  Hemoglobin A1C, Whole Blood: 8.7 % <H> [4.0 - 5.6] (01-31-18 @ 05:51)      02-07 Chol 183 LDL -- HDL -- Trig 313<H>, 02-01 Chol 149 LDL Unable to calculate LDL Cholesterol --- Interpretive Comment (for adults 18 and over)  Optimal LDL Level may vary based on clinical situation  Below 70                  Ideal for people at very high risk of heart  disease  Below 100                Ideal for people at risk of heart disease  100 - 129                   [place]  130 - 159                   Borderline high  160 - 189                   High  190 and Above          Very high HDL 8<L> Trig 499<H> HPI:  66 y/o F hx Breast Cancer (diagnosed in Feb 2017) currently on Herceptin (last dose Jan 20th), HTN, DM2 on 70/30, presenting with Fever at home, tmax of 102 yesterday, with whole body weakness. Patient currently intubated in MICU for influenza PNA c/b PEA arrest x 2.    Endocrine History:  Patient was diagnosed with DM2 in the 1990's and has been on insulin since 2004. Her endocrinologist is Dr. Weiner. She is currently on Humulin 70/30 50 units before breakfast and 40 units before dinner, in addition to metformin 500 mg TID. Known complications include nephropathy and neuropathy. No retinopathy but does have cataracts. HbA1c here 8.6. Family states that recently, prior to becoming sick, patient did have FS in the 300-400 range. Multiple family members have DM2 as well.    Unable to obtain ROS from patient as she is intubated.    She is currently on tube feeds, Vital AF, at 80 cc/hr, 24 hour feeds. Tube feeds were held yesterday evening and this AM, feeds appear to have been restarted at 12 pm.    She also has osteoporosis and was being consider for treatment with Prolia as outpatient.    PAST MEDICAL & SURGICAL HISTORY:  Diabetes  Breast CA  H/O mastectomy, bilateral      FAMILY HISTORY:  DM2 in multiple family members      Social History:  No cigarette or alcohol use  Lives with family    Outpatient Medications:  · 	furosemide 20 mg oral tablet: 1 tab(s) orally once a day, Last Dose Taken:    · 	ramipril 10 mg oral capsule: 1 cap(s) orally 2 times a day, Last Dose Taken:    · 	HumuLIN 70/30 KwikPen 70 units-30 units/mL subcutaneous suspension:  50 units subcutaneous in the morning and 40 units subcutaneous in the evening, Last Dose Taken:    · 	aspirin 81 mg oral delayed release tablet: 1 tab(s) orally 3 to 4 times per week, Last Dose Taken:    · 	Magnesium Capsules: 1 cap(s) orally once a day, Last Dose Taken:    · 	Calcium: 1 tab(s) orally once a day, Last Dose Taken:    · 	carvedilol 12.5 mg oral tablet: 1 tab(s) orally once a day (at bedtime), Last Dose Taken:    · 	carvedilol 25 mg oral tablet: 1 tab(s) orally once a day (in the morning), Last Dose Taken:    · 	metFORMIN 500 mg oral tablet: 1 tab(s) orally 2 times a day, Last Dose Taken:    · 	atorvastatin 40 mg oral tablet: 1 tab(s) orally once a day, Last Dose Taken:    · 	letrozole 2.5 mg oral tablet: 1 tab(s) orally once a day, Last Dose Taken:      MEDICATIONS  (STANDING):  ALBUTerol/ipratropium for Nebulization 3 milliLiter(s) Nebulizer every 6 hours  aspirin  chewable 81 milliGRAM(s) Oral daily  heparin  Injectable 5000 Unit(s) SubCutaneous every 8 hours  insulin lispro (HumaLOG) corrective regimen sliding scale   SubCutaneous every 6 hours  insulin NPH human recombinant 18 Unit(s) SubCutaneous every 6 hours  norepinephrine Infusion 0.1 MICROgram(s)/kG/Min (14.025 mL/Hr) IV Continuous <Continuous>  pantoprazole  Injectable 40 milliGRAM(s) IV Push every 12 hours    MEDICATIONS  (PRN):  chlorhexidine 0.12% Liquid 15 milliLiter(s) Swish and Spit every 4 hours PRN mouth hygiene      Allergies    NIFEdipine (Urticaria; Hives)  vitamin E (Short breath; Urticaria; Hives)        Review of Systems:  UNABLE TO OBTAIN    PHYSICAL EXAM:  VITALS: T(C): 36.7 (02-07-18 @ 12:00)  T(F): 98 (02-07-18 @ 12:00), Max: 99.8 (02-06-18 @ 20:00)  HR: 88 (02-07-18 @ 15:00) (60 - 884)  BP: 112/53 (02-07-18 @ 15:00) (93/47 - 151/65)  RR:  (18 - 100)  SpO2:  (96% - 100%)  Wt(kg): --  GENERAL: NAD, well-developed  EYES: No proptosis, anicteric  HEENT:  Atraumatic, Normocephalic, ET tube in place  THYROID: no goiter  RESPIRATORY: scattered crackles, no wheezes  CARDIOVASCULAR: Regular rate and rhythm; No murmurs; no peripheral edema  GI: Soft, nontender, non distended, normal bowel sounds  SKIN: Dry, intact  MUSCULOSKELETAL: spontaneously moving right upper extremity  NEURO: unable to assess as patient is intubated  PSYCH: patient intubated        POCT Blood Glucose.: 169 mg/dL (02-07-18 @ 11:41) NPH 18, H 4  POCT Blood Glucose.: 326 mg/dL (02-07-18 @ 05:52) NPH 18, H 16  POCT Blood Glucose.: 157 mg/dL (02-07-18 @ 02:01)  POCT Blood Glucose.: 112 mg/dL (02-06-18 @ 23:26)  POCT Blood Glucose.: 309 mg/dL (02-06-18 @ 17:12) NPH 18, H 8  POCT Blood Glucose.: 340 mg/dL (02-06-18 @ 11:06) NPH 18, H 8  POCT Blood Glucose.: 427 mg/dL (02-06-18 @ 06:05)  POCT Blood Glucose.: 355 mg/dL (02-05-18 @ 23:44)  POCT Blood Glucose.: 203 mg/dL (02-05-18 @ 16:08)  POCT Blood Glucose.: 280 mg/dL (02-05-18 @ 10:20)  POCT Blood Glucose.: 347 mg/dL (02-05-18 @ 05:12)  POCT Blood Glucose.: 379 mg/dL (02-04-18 @ 23:28)  POCT Blood Glucose.: 362 mg/dL (02-04-18 @ 17:11)                          8.5    9.2   )-----------( 207      ( 06 Feb 2018 23:53 )             24.3       02-06    139  |  99  |  60<H>  ----------------------------<  120<H>  3.7   |  25  |  3.78<H>    EGFR if : 13<L>  EGFR if non : 12<L>    Ca    8.7      02-06  Mg     2.4     02-06  Phos  4.3     02-06    TPro  5.9<L>  /  Alb  2.4<L>  /  TBili  1.0  /  DBili  x   /  AST  31  /  ALT  348<H>  /  AlkPhos  96  02-06      Thyroid Function Tests:      Hemoglobin A1C, Whole Blood: 8.6 % <H> [4.0 - 5.6] (01-31-18 @ 07:15)  Hemoglobin A1C, Whole Blood: 8.7 % <H> [4.0 - 5.6] (01-31-18 @ 05:51)      02-07 Chol 183 LDL -- HDL -- Trig 313<H>, 02-01 Chol 149 LDL Unable to calculate LDL Cholesterol --- Interpretive Comment (for adults 18 and over)  Optimal LDL Level may vary based on clinical situation  Below 70                  Ideal for people at very high risk of heart  disease  Below 100                Ideal for people at risk of heart disease  100 - 129                   [place]  130 - 159                   Borderline high  160 - 189                   High  190 and Above          Very high HDL 8<L> Trig 499<H>

## 2018-02-07 NOTE — PROGRESS NOTE ADULT - ATTENDING COMMENTS
66 yo F with Breast cancer, HTN presented with the flu, went into respiratory arrest, then PEA arrest, s/p chest tube, TPA and intubation.   Patient now with shock liver, RUSS pneumoperitoneum, elevated cardiac enzymes  off pressors  HD today   BP stable

## 2018-02-07 NOTE — CONSULT NOTE ADULT - ASSESSMENT
66 y/o F with DM2 on insulin, osteoporosis, HTN, here with acute respiratory failure 2/2 influenza s/p PEA arrest x 2, also with hyperglycemia in setting of uncontrolled DM2 68 y/o F with DM2 on insulin, osteoporosis, HTN, here with acute respiratory failure 2/2 influenza s/p PEA arrest x 2, also with hyperglycemia in setting of uncontrolled DM2 glucose values > 300 (high risk patient with high level decision-making).

## 2018-02-07 NOTE — CONSULT NOTE ADULT - PROBLEM SELECTOR RECOMMENDATION 9
- patient with hyperglycemia at this time while on tube feeds  - please discontinue high correction sliding scale insulin  - would increase NPH to 22 units q6 hours with moderate sliding scale every 6 hours; hold tube feeds if feeds are held  - will follow  - outpatient follow up with Dr. Weiner - patient with hyperglycemia at this time while on tube feeds  - please discontinue high correction sliding scale insulin  - would increase NPH to 28 units q6 hours with moderate sliding scale every 6 hours; hold NPH if feeds are held  - will follow  - outpatient follow up with Dr. Weiner

## 2018-02-07 NOTE — PROGRESS NOTE ADULT - ASSESSMENT
67F w PMH of bilateral Breast CA (on Trastuzumab, last dose Jan 20th) admitted to MICU with influenza PNA c/b PEA arrest x 2 with ROSC x 2 (10 min + 30 min). CPR c/b bilateral PTX (s/p bilateral chest tubes), pneumoperitoneum (s/p Peritoneal drain (pigtail) in right hemiabdomen and paracentesis decompression) a/w extensive subcutaneous emphysema. Pt received 100 mg of empiric TPA in ED. Surgery consulted and not recommending surgical intervention.   All 3 chest tubes removed on 1/31. Pt had cardiac MRI on 2/1 which was wnl. Right sided IJ removed and right shiley placed and HD initiated on 2/1. Abdominal drain removed on 2/1. AM chest xray on 2/2 showed enlargement of previously noted left PTX.  Patients pneumothorax initially resolved on CXR, but has returned.     Neurologic:  Alteration of mental status present. Likely due possible structural anoxic brain injury due to prolonged PEA arrest x2 + CTH after TPA showing evidence of new small acute left PCA territory infarct without hemorrhagic conversion. Repeat CTH after 24 hours and on 2/3 showing no acute changes.   MRI of head    Skin:  Lines: Right IJ Shiley  Decubiti: None    GI:  Diet: trickle feeds  Pt with persistent pneumoperitoneum seen on xray on 2/3. potentially secondary to pneumothorax communication. Imaging showing no evidence of viscous perforation.   GI stress prophylaxis indicated pantoprazole 40IV daily    Metabolic:  BMP showing evidence of RUSS 2/2 ATN 2/2 PEA arrest.   02-06    139  |  99  |  60<H>  ----------------------------<  120<H>  3.7   |  25  |  3.78<H>    Ca    8.7      06 Feb 2018 23:53  Phos  4.3     02-06  Mg     2.4     02-06    LIVER FUNCTIONS - ( 06 Feb 2018 23:53 )  Alb: 2.4 g/dL / Pro: 5.9 g/dL / ALK PHOS: 96 U/L / ALT: 348 U/L RC / AST: 31 U/L / GGT: x           Patient had evidence of shock liver 2/2 to prolonged PEA arrest. ALT and AST were both in the thousands, now trending down     Volume Assessment:  No peripheral edema    Hematologic:                        8.5    9.2   )-----------( 207      ( 06 Feb 2018 23:53 )             24.3   PT/INR - ( 06 Feb 2018 23:53 )   PT: 11.0 sec;   INR: 1.01 ratio         PTT - ( 06 Feb 2018 23:53 )  PTT:26.1 sec  DVT prophylaxis with HSQ     Infectious Disease:  s/p oseltamivir for influenza  c/w Zosyn 7/7 for possible aspiration PNA vs micro perf    Hemodynamics:  Was on pressors ON, Patient is not currently on pressors.    Cardiovascular:.  Cardiac MRI unremarkable. Shows normal RV and LV function.     Respiratory:  Pt had left chest tube replaced   Chest xray from 2/6: "Left chest tube with reexpansion of lung. No pneumothorax"

## 2018-02-07 NOTE — PROGRESS NOTE ADULT - SUBJECTIVE AND OBJECTIVE BOX
CHIEF COMPLAINT:     Interval Events: MRI peformed yesterday (not read yet).     REVIEW OF SYSTEMS:  Constitutional: [ ] negative [ ] fevers [ ] chills [ ] weight loss [ ] weight gain  HEENT: [ ] negative [ ] dry eyes [ ] eye irritation [ ] postnasal drip [ ] nasal congestion  CV: [ ] negative  [ ] chest pain [ ] orthopnea [ ] palpitations [ ] murmur  Resp: [ ] negative [ ] cough [ ] shortness of breath [ ] dyspnea [ ] wheezing [ ] sputum [ ] hemoptysis  GI: [ ] negative [ ] nausea [ ] vomiting [ ] diarrhea [ ] constipation [ ] abd pain [ ] dysphagia   : [ ] negative [ ] dysuria [ ] nocturia [ ] hematuria [ ] increased urinary frequency  Musculoskeletal: [ ] negative [ ] back pain [ ] myalgias [ ] arthralgias [ ] fracture  Skin: [ ] negative [ ] rash [ ] itch  Neurological: [ ] negative [ ] headache [ ] dizziness [ ] syncope [ ] weakness [ ] numbness  Psychiatric: [ ] negative [ ] anxiety [ ] depression  Endocrine: [ ] negative [ ] diabetes [ ] thyroid problem  Hematologic/Lymphatic: [ ] negative [ ] anemia [ ] bleeding problem  Allergic/Immunologic: [ ] negative [ ] itchy eyes [ ] nasal discharge [ ] hives [ ] angioedema  [ ] All other systems negative  [ ] Unable to assess ROS because ________    OBJECTIVE:  ICU Vital Signs Last 24 Hrs  T(C): 36.8 (07 Feb 2018 08:00), Max: 37.7 (06 Feb 2018 20:00)  T(F): 98.3 (07 Feb 2018 08:00), Max: 99.8 (06 Feb 2018 20:00)  HR: 81 (07 Feb 2018 08:00) (60 - 81)  BP: 147/67 (07 Feb 2018 08:00) (84/39 - 151/65)  BP(mean): 96 (07 Feb 2018 08:00) (56 - 98)  ABP: --  ABP(mean): --  RR: 24 (07 Feb 2018 08:00) (18 - 100)  SpO2: 100% (07 Feb 2018 08:00) (96% - 100%)    Mode: AC/ CMV (Assist Control/ Continuous Mandatory Ventilation), RR (machine): 24, TV (machine): 400, FiO2: 30, PEEP: 5, ITime: 1, MAP: 12, PIP: 30    02-06 @ 07:01  -  02-07 @ 07:00  --------------------------------------------------------  IN: 1565 mL / OUT: 70 mL / NET: 1495 mL      CAPILLARY BLOOD GLUCOSE  427 (06 Feb 2018 06:00)      POCT Blood Glucose.: 326 mg/dL (07 Feb 2018 05:52)      PHYSICAL EXAM:  General:   HEENT:   Lymph Nodes:  Neck:   Respiratory:   Cardiovascular:   Abdomen:   Extremities:   Skin:   Neurological:  Psychiatry:    LINES:    HOSPITAL MEDICATIONS:  Standing Meds:  ALBUTerol/ipratropium for Nebulization 3 milliLiter(s) Nebulizer every 6 hours  aspirin  chewable 81 milliGRAM(s) Oral daily  heparin  Injectable 5000 Unit(s) SubCutaneous every 8 hours  insulin glargine Injectable (LANTUS) 10 Unit(s) SubCutaneous at bedtime  insulin lispro (HumaLOG) corrective regimen sliding scale   SubCutaneous every 6 hours  insulin NPH human recombinant 18 Unit(s) SubCutaneous every 6 hours  norepinephrine Infusion 0.1 MICROgram(s)/kG/Min IV Continuous <Continuous>  pantoprazole  Injectable 40 milliGRAM(s) IV Push every 12 hours  piperacillin/tazobactam IVPB. 3.375 Gram(s) IV Intermittent every 12 hours  polyethylene glycol 3350 17 Gram(s) Oral two times a day  senna 2 Tablet(s) Oral at bedtime      PRN Meds:  chlorhexidine 0.12% Liquid 15 milliLiter(s) Swish and Spit every 4 hours PRN      LABS:                        8.5    9.2   )-----------( 207      ( 06 Feb 2018 23:53 )             24.3     Hgb Trend: 8.5<--, 8.9<--, 8.4<--, 8.7<--, 9.1<--  02-06    139  |  99  |  60<H>  ----------------------------<  120<H>  3.7   |  25  |  3.78<H>    Ca    8.7      06 Feb 2018 23:53  Phos  4.3     02-06  Mg     2.4     02-06    TPro  5.9<L>  /  Alb  2.4<L>  /  TBili  1.0  /  DBili  x   /  AST  31  /  ALT  348<H>  /  AlkPhos  96  02-06    Creatinine Trend: 3.78<--, 2.61<--, 3.73<--, 2.67<--, 1.86<--, 3.44<--  PT/INR - ( 06 Feb 2018 23:53 )   PT: 11.0 sec;   INR: 1.01 ratio         PTT - ( 06 Feb 2018 23:53 )  PTT:26.1 sec          MICROBIOLOGY:     RADIOLOGY:  [ ] Reviewed and interpreted by me    EKG: CHIEF COMPLAINT: Influenza PNA c/b in hospital PEA arrest x 2 with possible anoxic brain injury, persistent left sided PTX (moderate in size), pneumoperitoneum    Interval Events: MRI peformed yesterday (not read yet).     REVIEW OF SYSTEMS:  Constitutional: [ ] negative [ ] fevers [ ] chills [ ] weight loss [ ] weight gain  HEENT: [ ] negative [ ] dry eyes [ ] eye irritation [ ] postnasal drip [ ] nasal congestion  CV: [ ] negative  [ ] chest pain [ ] orthopnea [ ] palpitations [ ] murmur  Resp: [ ] negative [ ] cough [ ] shortness of breath [ ] dyspnea [ ] wheezing [ ] sputum [ ] hemoptysis  GI: [ ] negative [ ] nausea [ ] vomiting [ ] diarrhea [ ] constipation [ ] abd pain [ ] dysphagia   : [ ] negative [ ] dysuria [ ] nocturia [ ] hematuria [ ] increased urinary frequency  Musculoskeletal: [ ] negative [ ] back pain [ ] myalgias [ ] arthralgias [ ] fracture  Skin: [ ] negative [ ] rash [ ] itch  Neurological: [ ] negative [ ] headache [ ] dizziness [ ] syncope [ ] weakness [ ] numbness  Psychiatric: [ ] negative [ ] anxiety [ ] depression  Endocrine: [ ] negative [ ] diabetes [ ] thyroid problem  Hematologic/Lymphatic: [ ] negative [ ] anemia [ ] bleeding problem  Allergic/Immunologic: [ ] negative [ ] itchy eyes [ ] nasal discharge [ ] hives [ ] angioedema  [ ] All other systems negative  [x ] Unable to assess ROS because pt intubated     OBJECTIVE:  ICU Vital Signs Last 24 Hrs  T(C): 36.8 (07 Feb 2018 08:00), Max: 37.7 (06 Feb 2018 20:00)  T(F): 98.3 (07 Feb 2018 08:00), Max: 99.8 (06 Feb 2018 20:00)  HR: 81 (07 Feb 2018 08:00) (60 - 81)  BP: 147/67 (07 Feb 2018 08:00) (84/39 - 151/65)  BP(mean): 96 (07 Feb 2018 08:00) (56 - 98)  ABP: --  ABP(mean): --  RR: 24 (07 Feb 2018 08:00) (18 - 100)  SpO2: 100% (07 Feb 2018 08:00) (96% - 100%)    Mode: AC/ CMV (Assist Control/ Continuous Mandatory Ventilation), RR (machine): 24, TV (machine): 400, FiO2: 30, PEEP: 5, ITime: 1, MAP: 12, PIP: 30    02-06 @ 07:01  -  02-07 @ 07:00  --------------------------------------------------------  IN: 1565 mL / OUT: 70 mL / NET: 1495 mL      CAPILLARY BLOOD GLUCOSE  427 (06 Feb 2018 06:00)      POCT Blood Glucose.: 326 mg/dL (07 Feb 2018 05:52)      PHYSICAL EXAM:  General: NAD, intubated  Head: AT/NC   Respiratory: lungs CTAB  Cardiovascular: RRR no MRG   Abdomen: soft- non-tender  Extremities: no edema  Skin: R shiley catheter  Neurological: opens eyes, tracking, nonresponsive, following commands, moving all extremities except RLE. Gives thumbs up    LINES:    HOSPITAL MEDICATIONS:  Standing Meds:  ALBUTerol/ipratropium for Nebulization 3 milliLiter(s) Nebulizer every 6 hours  aspirin  chewable 81 milliGRAM(s) Oral daily  heparin  Injectable 5000 Unit(s) SubCutaneous every 8 hours  insulin glargine Injectable (LANTUS) 10 Unit(s) SubCutaneous at bedtime  insulin lispro (HumaLOG) corrective regimen sliding scale   SubCutaneous every 6 hours  insulin NPH human recombinant 18 Unit(s) SubCutaneous every 6 hours  norepinephrine Infusion 0.1 MICROgram(s)/kG/Min IV Continuous <Continuous>  pantoprazole  Injectable 40 milliGRAM(s) IV Push every 12 hours  piperacillin/tazobactam IVPB. 3.375 Gram(s) IV Intermittent every 12 hours  polyethylene glycol 3350 17 Gram(s) Oral two times a day  senna 2 Tablet(s) Oral at bedtime      PRN Meds:  chlorhexidine 0.12% Liquid 15 milliLiter(s) Swish and Spit every 4 hours PRN      LABS:                        8.5    9.2   )-----------( 207      ( 06 Feb 2018 23:53 )             24.3     Hgb Trend: 8.5<--, 8.9<--, 8.4<--, 8.7<--, 9.1<--  02-06    139  |  99  |  60<H>  ----------------------------<  120<H>  3.7   |  25  |  3.78<H>    Ca    8.7      06 Feb 2018 23:53  Phos  4.3     02-06  Mg     2.4     02-06    TPro  5.9<L>  /  Alb  2.4<L>  /  TBili  1.0  /  DBili  x   /  AST  31  /  ALT  348<H>  /  AlkPhos  96  02-06    Creatinine Trend: 3.78<--, 2.61<--, 3.73<--, 2.67<--, 1.86<--, 3.44<--  PT/INR - ( 06 Feb 2018 23:53 )   PT: 11.0 sec;   INR: 1.01 ratio         PTT - ( 06 Feb 2018 23:53 )  PTT:26.1 sec

## 2018-02-07 NOTE — PROGRESS NOTE ADULT - ATTENDING COMMENTS
67F PMH HTN, DM2 (on insulin), breast ca on trastuzumab, s/p b/l mastectomy came to [facility] with Influenza A. She went into acute hypoxic resp failure in the ED requiring intubation (initial esophageal intubation - thereafter corrected to tracheal intubation). Subsequently, she went into PEA cardiac arrest, successfully resuscitated after 5 mins. She then went into PEA arrest again and required 30 mins of CPR before getting ROSC. She received tPA for suspected PE and had 2 chest tubes and a peritoneal drain placed during/after CPR. Course further complicated by septic/cardiogenic shock, RUSS/ATN, shock liver, pneumonia, left pneumothorax and pneumoperitoneum (no intraabdominal viscus perforation is found). 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 She has uncontrolled hyperglycemia.     follows simple commands  noticed slight movement in LUE today, able to move RUE and LLE, I did not see any movement in RLE  MRI brain reviewed - d/w neuro, will start lipitor after reviewing lipid profile and if ALT continues to improve  order TTE with bubble study  continue aspirin  not sedated  BP is stable, continue hydralazine, labetalol  cardiac fn normal on cardiac MRI  tolerating PSV 12/5, will lower PS after HD and eval for extubation  Ptx resolved after replacing chest tube, continue suction  no fever, cx have been neg, s/p 7 days of zosyn - d/c  she will need evaluation for esophageal injury once extubated   tolerating TF at goal  surgery f/u for persistent pneumomediastinum  dialysis per renal  avoid hepatotoxic, nephrotoxic meds  glucose control labile, now on NPH and lantus, consult endocrine  protonix, sc heparin for ppx  48 mins critical care time spent

## 2018-02-07 NOTE — PROGRESS NOTE ADULT - ASSESSMENT
67yF w/ Pneumoperitoneum    - C/w current medical management  - Pneumoperitoneum on repeat CXRs appears positional  - No evidence of perforated viscus (Soft abdominal exam, hemodynamically stable, not requiring pressors, tolerating TF w/ normal GI function)  - Would consider repeat CT scan if concern for intraabdominal pathology  - Please page [phone] w/ any questions    EUGENE Cole PGY-2

## 2018-02-08 LAB
ALBUMIN SERPL ELPH-MCNC: 2.4 G/DL — LOW (ref 3.3–5)
ALP SERPL-CCNC: 129 U/L — HIGH (ref 40–120)
ALT FLD-CCNC: 214 U/L RC — HIGH (ref 10–45)
ANION GAP SERPL CALC-SCNC: 15 MMOL/L — SIGNIFICANT CHANGE UP (ref 5–17)
AST SERPL-CCNC: 31 U/L — SIGNIFICANT CHANGE UP (ref 10–40)
BILIRUB SERPL-MCNC: 1 MG/DL — SIGNIFICANT CHANGE UP (ref 0.2–1.2)
BUN SERPL-MCNC: 25 MG/DL — HIGH (ref 7–23)
CALCIUM SERPL-MCNC: 7.8 MG/DL — LOW (ref 8.4–10.5)
CHLORIDE SERPL-SCNC: 95 MMOL/L — LOW (ref 96–108)
CO2 SERPL-SCNC: 26 MMOL/L — SIGNIFICANT CHANGE UP (ref 22–31)
CREAT SERPL-MCNC: 2.02 MG/DL — HIGH (ref 0.5–1.3)
CULTURE RESULTS: SIGNIFICANT CHANGE UP
CULTURE RESULTS: SIGNIFICANT CHANGE UP
GAS PNL BLDA: SIGNIFICANT CHANGE UP
GLUCOSE BLDC GLUCOMTR-MCNC: 100 MG/DL — HIGH (ref 70–99)
GLUCOSE BLDC GLUCOMTR-MCNC: 103 MG/DL — HIGH (ref 70–99)
GLUCOSE BLDC GLUCOMTR-MCNC: 113 MG/DL — HIGH (ref 70–99)
GLUCOSE BLDC GLUCOMTR-MCNC: 114 MG/DL — HIGH (ref 70–99)
GLUCOSE BLDC GLUCOMTR-MCNC: 114 MG/DL — HIGH (ref 70–99)
GLUCOSE BLDC GLUCOMTR-MCNC: 118 MG/DL — HIGH (ref 70–99)
GLUCOSE BLDC GLUCOMTR-MCNC: 130 MG/DL — HIGH (ref 70–99)
GLUCOSE BLDC GLUCOMTR-MCNC: 170 MG/DL — HIGH (ref 70–99)
GLUCOSE BLDC GLUCOMTR-MCNC: 369 MG/DL — HIGH (ref 70–99)
GLUCOSE BLDC GLUCOMTR-MCNC: 70 MG/DL — SIGNIFICANT CHANGE UP (ref 70–99)
GLUCOSE BLDC GLUCOMTR-MCNC: 92 MG/DL — SIGNIFICANT CHANGE UP (ref 70–99)
GLUCOSE SERPL-MCNC: 261 MG/DL — HIGH (ref 70–99)
HCT VFR BLD CALC: 24.8 % — LOW (ref 34.5–45)
HDLC SERPL-MCNC: 10 MG/DL — LOW (ref 40–125)
HGB BLD-MCNC: 8.7 G/DL — LOW (ref 11.5–15.5)
LIPID PNL WITH DIRECT LDL SERPL: 110 MG/DL — SIGNIFICANT CHANGE UP
MAGNESIUM SERPL-MCNC: 2 MG/DL — SIGNIFICANT CHANGE UP (ref 1.6–2.6)
MCHC RBC-ENTMCNC: 31.5 PG — SIGNIFICANT CHANGE UP (ref 27–34)
MCHC RBC-ENTMCNC: 35 GM/DL — SIGNIFICANT CHANGE UP (ref 32–36)
MCV RBC AUTO: 89.9 FL — SIGNIFICANT CHANGE UP (ref 80–100)
PHOSPHATE SERPL-MCNC: 3.1 MG/DL — SIGNIFICANT CHANGE UP (ref 2.5–4.5)
PLATELET # BLD AUTO: 233 K/UL — SIGNIFICANT CHANGE UP (ref 150–400)
POTASSIUM SERPL-MCNC: 4 MMOL/L — SIGNIFICANT CHANGE UP (ref 3.5–5.3)
POTASSIUM SERPL-SCNC: 4 MMOL/L — SIGNIFICANT CHANGE UP (ref 3.5–5.3)
PROT SERPL-MCNC: 6.2 G/DL — SIGNIFICANT CHANGE UP (ref 6–8.3)
RBC # BLD: 2.75 M/UL — LOW (ref 3.8–5.2)
RBC # FLD: 13.6 % — SIGNIFICANT CHANGE UP (ref 10.3–14.5)
SODIUM SERPL-SCNC: 136 MMOL/L — SIGNIFICANT CHANGE UP (ref 135–145)
SPECIMEN SOURCE: SIGNIFICANT CHANGE UP
SPECIMEN SOURCE: SIGNIFICANT CHANGE UP
TOTAL CHOLESTEROL/HDL RATIO MEASUREMENT: 18.3 RATIO — HIGH (ref 3.3–7.1)
WBC # BLD: 10.7 K/UL — HIGH (ref 3.8–10.5)
WBC # FLD AUTO: 10.7 K/UL — HIGH (ref 3.8–10.5)

## 2018-02-08 PROCEDURE — 99291 CRITICAL CARE FIRST HOUR: CPT

## 2018-02-08 PROCEDURE — 71045 X-RAY EXAM CHEST 1 VIEW: CPT | Mod: 26

## 2018-02-08 PROCEDURE — 99232 SBSQ HOSP IP/OBS MODERATE 35: CPT | Mod: GC

## 2018-02-08 RX ORDER — SODIUM CHLORIDE 9 MG/ML
1000 INJECTION, SOLUTION INTRAVENOUS
Qty: 0 | Refills: 0 | Status: DISCONTINUED | OUTPATIENT
Start: 2018-02-08 | End: 2018-02-08

## 2018-02-08 RX ORDER — DEXTROSE 50 % IN WATER 50 %
1 SYRINGE (ML) INTRAVENOUS ONCE
Qty: 0 | Refills: 0 | Status: DISCONTINUED | OUTPATIENT
Start: 2018-02-08 | End: 2018-02-08

## 2018-02-08 RX ORDER — DEXTROSE 50 % IN WATER 50 %
25 SYRINGE (ML) INTRAVENOUS ONCE
Qty: 0 | Refills: 0 | Status: DISCONTINUED | OUTPATIENT
Start: 2018-02-08 | End: 2018-02-08

## 2018-02-08 RX ORDER — HYDRALAZINE HCL 50 MG
10 TABLET ORAL ONCE
Qty: 0 | Refills: 0 | Status: COMPLETED | OUTPATIENT
Start: 2018-02-08 | End: 2018-02-08

## 2018-02-08 RX ORDER — HUMAN INSULIN 100 [IU]/ML
22 INJECTION, SUSPENSION SUBCUTANEOUS ONCE
Qty: 0 | Refills: 0 | Status: COMPLETED | OUTPATIENT
Start: 2018-02-08 | End: 2018-02-08

## 2018-02-08 RX ORDER — DEXTROSE 50 % IN WATER 50 %
12.5 SYRINGE (ML) INTRAVENOUS ONCE
Qty: 0 | Refills: 0 | Status: DISCONTINUED | OUTPATIENT
Start: 2018-02-08 | End: 2018-02-08

## 2018-02-08 RX ORDER — GLUCAGON INJECTION, SOLUTION 0.5 MG/.1ML
1 INJECTION, SOLUTION SUBCUTANEOUS ONCE
Qty: 0 | Refills: 0 | Status: DISCONTINUED | OUTPATIENT
Start: 2018-02-08 | End: 2018-02-08

## 2018-02-08 RX ORDER — SODIUM CHLORIDE 9 MG/ML
1000 INJECTION, SOLUTION INTRAVENOUS
Qty: 0 | Refills: 0 | Status: DISCONTINUED | OUTPATIENT
Start: 2018-02-08 | End: 2018-02-09

## 2018-02-08 RX ORDER — DEXTROSE 50 % IN WATER 50 %
25 SYRINGE (ML) INTRAVENOUS ONCE
Qty: 0 | Refills: 0 | Status: COMPLETED | OUTPATIENT
Start: 2018-02-08 | End: 2018-02-08

## 2018-02-08 RX ORDER — ATORVASTATIN CALCIUM 80 MG/1
40 TABLET, FILM COATED ORAL AT BEDTIME
Qty: 0 | Refills: 0 | Status: DISCONTINUED | OUTPATIENT
Start: 2018-02-08 | End: 2018-02-09

## 2018-02-08 RX ADMIN — Medication 2: at 11:46

## 2018-02-08 RX ADMIN — Medication 3 MILLILITER(S): at 17:30

## 2018-02-08 RX ADMIN — Medication 10 MILLIGRAM(S): at 18:24

## 2018-02-08 RX ADMIN — Medication 10: at 05:43

## 2018-02-08 RX ADMIN — Medication 25 MILLILITER(S): at 17:55

## 2018-02-08 RX ADMIN — Medication 3 MILLILITER(S): at 11:51

## 2018-02-08 RX ADMIN — HEPARIN SODIUM 5000 UNIT(S): 5000 INJECTION INTRAVENOUS; SUBCUTANEOUS at 07:53

## 2018-02-08 RX ADMIN — HUMAN INSULIN 28 UNIT(S): 100 INJECTION, SUSPENSION SUBCUTANEOUS at 00:15

## 2018-02-08 RX ADMIN — HUMAN INSULIN 22 UNIT(S): 100 INJECTION, SUSPENSION SUBCUTANEOUS at 11:54

## 2018-02-08 RX ADMIN — PANTOPRAZOLE SODIUM 40 MILLIGRAM(S): 20 TABLET, DELAYED RELEASE ORAL at 11:45

## 2018-02-08 RX ADMIN — PANTOPRAZOLE SODIUM 40 MILLIGRAM(S): 20 TABLET, DELAYED RELEASE ORAL at 00:21

## 2018-02-08 RX ADMIN — HEPARIN SODIUM 5000 UNIT(S): 5000 INJECTION INTRAVENOUS; SUBCUTANEOUS at 14:28

## 2018-02-08 RX ADMIN — HUMAN INSULIN 28 UNIT(S): 100 INJECTION, SUSPENSION SUBCUTANEOUS at 05:42

## 2018-02-08 RX ADMIN — Medication 3 MILLILITER(S): at 05:09

## 2018-02-08 RX ADMIN — HUMAN INSULIN 28 UNIT(S): 100 INJECTION, SUSPENSION SUBCUTANEOUS at 11:47

## 2018-02-08 RX ADMIN — SODIUM CHLORIDE 50 MILLILITER(S): 9 INJECTION, SOLUTION INTRAVENOUS at 20:08

## 2018-02-08 RX ADMIN — SODIUM CHLORIDE 30 MILLILITER(S): 9 INJECTION, SOLUTION INTRAVENOUS at 17:58

## 2018-02-08 RX ADMIN — Medication 2: at 00:15

## 2018-02-08 RX ADMIN — HEPARIN SODIUM 5000 UNIT(S): 5000 INJECTION INTRAVENOUS; SUBCUTANEOUS at 00:14

## 2018-02-08 RX ADMIN — Medication 81 MILLIGRAM(S): at 11:45

## 2018-02-08 NOTE — PROGRESS NOTE ADULT - SUBJECTIVE AND OBJECTIVE BOX
Chief Complaint: f/u DM2 with hyperglycemia    History:  Patient to be extubated today. Tube feeds were held at ~11 AM, however patient received 22 units of NPH with 2 units of Humalog. At time of this note, patient has been extubated.     MEDICATIONS  (STANDING):  ALBUTerol/ipratropium for Nebulization 3 milliLiter(s) Nebulizer every 6 hours  aspirin  chewable 81 milliGRAM(s) Oral daily  atorvastatin 40 milliGRAM(s) Oral at bedtime  dextrose 5%. 1000 milliLiter(s) (50 mL/Hr) IV Continuous <Continuous>  dextrose 50% Injectable 12.5 Gram(s) IV Push once  dextrose 50% Injectable 25 Gram(s) IV Push once  dextrose 50% Injectable 25 Gram(s) IV Push once  heparin  Injectable 5000 Unit(s) SubCutaneous every 8 hours  insulin lispro (HumaLOG) corrective regimen sliding scale   SubCutaneous every 6 hours  pantoprazole  Injectable 40 milliGRAM(s) IV Push every 12 hours    MEDICATIONS  (PRN):  chlorhexidine 0.12% Liquid 15 milliLiter(s) Swish and Spit every 4 hours PRN mouth hygiene  dextrose Gel 1 Dose(s) Oral once PRN Blood Glucose LESS THAN 70 milliGRAM(s)/deciLiter  glucagon  Injectable 1 milliGRAM(s) IntraMuscular once PRN Glucose <70 milliGRAM(s)/deciLiter      Allergies    NIFEdipine (Urticaria; Hives)  vitamin E (Short breath; Urticaria; Hives)        Review of Systems:  UNABLE TO ASSESS AS PATIENT IS INTUBATED    PHYSICAL EXAM:  VITALS: T(C): 37.3 (02-08-18 @ 08:00)  T(F): 99.1 (02-08-18 @ 08:00), Max: 99.3 (02-07-18 @ 16:00)  HR: 101 (02-08-18 @ 14:02) (83 - 995)  BP: 157/63 (02-08-18 @ 14:00) (114/51 - 157/63)  RR:  (20 - 25)  SpO2:  (99% - 100%)  Wt(kg): --  GENERAL: NAD, well-developed  EYES: No proptosis, anicteric  HEENT:  Atraumatic, Normocephalic, ET tube in place  RESPIRATORY: Clear to auscultation bilaterally; No rales, rhonchi, wheezing, or rubs  CARDIOVASCULAR: Regular rate and rhythm; No murmurs  GI: Soft, nontender, non distended, normal bowel sounds  SKIN: Dry, intact      POCT Blood Glucose.: 113 mg/dL (02-08-18 @ 14:32)  POCT Blood Glucose.: 170 mg/dL (02-08-18 @ 11:23) NPH 22, H 4  POCT Blood Glucose.: 369 mg/dL (02-08-18 @ 05:38) NPH 28, H 10  POCT Blood Glucose.: 182 mg/dL (02-07-18 @ 23:34) NPH 28, H 2  POCT Blood Glucose.: 311 mg/dL (02-07-18 @ 17:17) NPH 18, H 16  POCT Blood Glucose.: 169 mg/dL (02-07-18 @ 11:41) NPH 18, H 4  POCT Blood Glucose.: 326 mg/dL (02-07-18 @ 05:52) NPH 18, H 16  POCT Blood Glucose.: 157 mg/dL (02-07-18 @ 02:01)  POCT Blood Glucose.: 112 mg/dL (02-06-18 @ 23:26)  POCT Blood Glucose.: 309 mg/dL (02-06-18 @ 17:12)  POCT Blood Glucose.: 340 mg/dL (02-06-18 @ 11:06)  POCT Blood Glucose.: 427 mg/dL (02-06-18 @ 06:05)  POCT Blood Glucose.: 355 mg/dL (02-05-18 @ 23:44)  POCT Blood Glucose.: 203 mg/dL (02-05-18 @ 16:08)      02-08    136  |  95<L>  |  25<H>  ----------------------------<  261<H>  4.0   |  26  |  2.02<H>    EGFR if : 28<L>  EGFR if non : 25<L>    Ca    7.8<L>      02-08  Mg     2.0     02-08  Phos  3.1     02-08    TPro  6.2  /  Alb  2.4<L>  /  TBili  1.0  /  DBili  x   /  AST  31  /  ALT  214<H>  /  AlkPhos  129<H>  02-08          Thyroid Function Tests:      Hemoglobin A1C, Whole Blood: 8.6 % <H> [4.0 - 5.6] (01-31-18 @ 07:15)  Hemoglobin A1C, Whole Blood: 8.7 % <H> [4.0 - 5.6] (01-31-18 @ 05:51)

## 2018-02-08 NOTE — PHYSICAL THERAPY INITIAL EVALUATION ADULT - ACTIVE RANGE OF MOTION EXAMINATION, REHAB EVAL
Left LE Active ROM was WFL (within functional limits)/Right UE Active ROM was WFL (within functional limits)

## 2018-02-08 NOTE — PHYSICAL THERAPY INITIAL EVALUATION ADULT - ADDITIONAL COMMENTS
PTA pt lived in 2nd floor apt with  and dtr, was able to negotiate flight with B HR and some assist, req assist with ADLs cooking/cleaning otherwise independent, has raised toilet seat and grab bars in bathroom.

## 2018-02-08 NOTE — PROGRESS NOTE ADULT - SUBJECTIVE AND OBJECTIVE BOX
Interval Events: MRI showed watershed infarcts and L occipital stroke. Patient is still anuric, s/p HD yesterday    REVIEW OF SYSTEMS:  Constitutional: [ ] negative [ ] fevers [ ] chills [ ] weight loss [ ] weight gain  HEENT: [ ] negative [ ] dry eyes [ ] eye irritation [ ] postnasal drip [ ] nasal congestion  CV: [ ] negative  [ ] chest pain [ ] orthopnea [ ] palpitations [ ] murmur  Resp: [ ] negative [ ] cough [ ] shortness of breath [ ] dyspnea [ ] wheezing [ ] sputum [ ] hemoptysis  GI: [ ] negative [ ] nausea [ ] vomiting [ ] diarrhea [ ] constipation [ ] abd pain [ ] dysphagia   : [ ] negative [ ] dysuria [ ] nocturia [ ] hematuria [ ] increased urinary frequency  Musculoskeletal: [ ] negative [ ] back pain [ ] myalgias [ ] arthralgias [ ] fracture  Skin: [ ] negative [ ] rash [ ] itch  Neurological: [ ] negative [ ] headache [ ] dizziness [ ] syncope [ ] weakness [ ] numbness  Psychiatric: [ ] negative [ ] anxiety [ ] depression  Endocrine: [ ] negative [ ] diabetes [ ] thyroid problem  Hematologic/Lymphatic: [ ] negative [ ] anemia [ ] bleeding problem  Allergic/Immunologic: [ ] negative [ ] itchy eyes [ ] nasal discharge [ ] hives [ ] angioedema  [ ] All other systems negative  [x ] Unable to assess ROS because patient is intubated    OBJECTIVE:  ICU Vital Signs Last 24 Hrs  T(C): 37.3 (08 Feb 2018 08:00), Max: 37.4 (07 Feb 2018 16:00)  T(F): 99.1 (08 Feb 2018 08:00), Max: 99.3 (07 Feb 2018 16:00)  HR: 88 (08 Feb 2018 08:32) (70 - 887)  BP: 131/55 (08 Feb 2018 08:30) (110/51 - 156/60)  BP(mean): 79 (08 Feb 2018 08:30) (73 - 96)  ABP: --  ABP(mean): --  RR: 25 (08 Feb 2018 08:30) (18 - 25)  SpO2: 100% (08 Feb 2018 08:32) (99% - 100%)    Mode: CPAP with PS, FiO2: 30, PEEP: 5, PS: 10, MAP: 9, PIP: 16    02-07 @ 07:01  -  02-08 @ 07:00  --------------------------------------------------------  IN: 1440 mL / OUT: 1077 mL / NET: 363 mL      CAPILLARY BLOOD GLUCOSE      POCT Blood Glucose.: 369 mg/dL (08 Feb 2018 05:38)      PHYSICAL EXAM:  General: NAD  HEENT: AT/NC  Respiratory: clear to ascultation anteriorly  Cardiovascular: (+) S1/S2  Abdomen: soft- non-tender, non-distended  Extremities: no clubbing, cyanosis, edema  Skin:   Neurological: intubated, obeys commands, opens eyes spontaneously, moves bilateral UE's and L leg to command  Psychiatry: intubated, obeys commands     LINES:    HOSPITAL MEDICATIONS:  aspirin  chewable 81 milliGRAM(s) Oral daily  heparin  Injectable 5000 Unit(s) SubCutaneous every 8 hours        insulin lispro (HumaLOG) corrective regimen sliding scale   SubCutaneous every 6 hours  insulin NPH human recombinant 28 Unit(s) SubCutaneous every 6 hours    ALBUTerol/ipratropium for Nebulization 3 milliLiter(s) Nebulizer every 6 hours      pantoprazole  Injectable 40 milliGRAM(s) IV Push every 12 hours            chlorhexidine 0.12% Liquid 15 milliLiter(s) Swish and Spit every 4 hours PRN        LABS:                        8.7    10.7  )-----------( 233      ( 08 Feb 2018 01:27 )             24.8     Hgb Trend: 8.7<--, 8.5<--, 8.9<--, 8.4<--, 8.7<--  02-08    136  |  95<L>  |  25<H>  ----------------------------<  261<H>  4.0   |  26  |  2.02<H>    Ca    7.8<L>      08 Feb 2018 01:27  Phos  3.1     02-08  Mg     2.0     02-08    TPro  6.2  /  Alb  2.4<L>  /  TBili  1.0  /  DBili  x   /  AST  31  /  ALT  214<H>  /  AlkPhos  129<H>  02-08    Creatinine Trend: 2.02<--, 3.78<--, 2.61<--, 3.73<--, 2.67<--, 1.86<--  PT/INR - ( 06 Feb 2018 23:53 )   PT: 11.0 sec;   INR: 1.01 ratio         PTT - ( 06 Feb 2018 23:53 )  PTT:26.1 sec    Arterial Blood Gas:  02-08 @ 01:41  7.51/36/138/29/100/5.7  ABG lactate: -- Interval Events: MRI showed watershed infarcts and L occipital stroke. Patient is still anuric, s/p HD yesterday    REVIEW OF SYSTEMS:  Constitutional: [ ] negative [ ] fevers [ ] chills [ ] weight loss [ ] weight gain  HEENT: [ ] negative [ ] dry eyes [ ] eye irritation [ ] postnasal drip [ ] nasal congestion  CV: [ ] negative  [ ] chest pain [ ] orthopnea [ ] palpitations [ ] murmur  Resp: [ ] negative [ ] cough [ ] shortness of breath [ ] dyspnea [ ] wheezing [ ] sputum [ ] hemoptysis  GI: [ ] negative [ ] nausea [ ] vomiting [ ] diarrhea [ ] constipation [ ] abd pain [ ] dysphagia   : [ ] negative [ ] dysuria [ ] nocturia [ ] hematuria [ ] increased urinary frequency  Musculoskeletal: [ ] negative [ ] back pain [ ] myalgias [ ] arthralgias [ ] fracture  Skin: [ ] negative [ ] rash [ ] itch  Neurological: [ ] negative [ ] headache [ ] dizziness [ ] syncope [ ] weakness [ ] numbness  Psychiatric: [ ] negative [ ] anxiety [ ] depression  Endocrine: [ ] negative [ ] diabetes [ ] thyroid problem  Hematologic/Lymphatic: [ ] negative [ ] anemia [ ] bleeding problem  Allergic/Immunologic: [ ] negative [ ] itchy eyes [ ] nasal discharge [ ] hives [ ] angioedema  [ ] All other systems negative  [x ] Unable to assess ROS because patient is intubated    OBJECTIVE:  ICU Vital Signs Last 24 Hrs  T(C): 37.3 (08 Feb 2018 08:00), Max: 37.4 (07 Feb 2018 16:00)  T(F): 99.1 (08 Feb 2018 08:00), Max: 99.3 (07 Feb 2018 16:00)  HR: 88 (08 Feb 2018 08:32) (70 - 887)  BP: 131/55 (08 Feb 2018 08:30) (110/51 - 156/60)  BP(mean): 79 (08 Feb 2018 08:30) (73 - 96)  ABP: --  ABP(mean): --  RR: 25 (08 Feb 2018 08:30) (18 - 25)  SpO2: 100% (08 Feb 2018 08:32) (99% - 100%)    Mode: CPAP with PS, FiO2: 30, PEEP: 5, PS: 10, MAP: 9, PIP: 16    02-07 @ 07:01  -  02-08 @ 07:00  --------------------------------------------------------  IN: 1440 mL / OUT: 1077 mL / NET: 363 mL      CAPILLARY BLOOD GLUCOSE      POCT Blood Glucose.: 369 mg/dL (08 Feb 2018 05:38)      PHYSICAL EXAM:  General: NAD  HEENT: AT/NC  Respiratory: clear to ascultation anteriorly  Cardiovascular: (+) S1/S2  Abdomen: soft- non-tender, non-distended  Extremities: no clubbing, cyanosis, edema  Skin:   Neurological: intubated, obeys commands, opens eyes spontaneously, moves all extremities to command  Psychiatry: intubated, obeys commands     LINES:    HOSPITAL MEDICATIONS:  aspirin  chewable 81 milliGRAM(s) Oral daily  heparin  Injectable 5000 Unit(s) SubCutaneous every 8 hours        insulin lispro (HumaLOG) corrective regimen sliding scale   SubCutaneous every 6 hours  insulin NPH human recombinant 28 Unit(s) SubCutaneous every 6 hours    ALBUTerol/ipratropium for Nebulization 3 milliLiter(s) Nebulizer every 6 hours      pantoprazole  Injectable 40 milliGRAM(s) IV Push every 12 hours            chlorhexidine 0.12% Liquid 15 milliLiter(s) Swish and Spit every 4 hours PRN        LABS:                        8.7    10.7  )-----------( 233      ( 08 Feb 2018 01:27 )             24.8     Hgb Trend: 8.7<--, 8.5<--, 8.9<--, 8.4<--, 8.7<--  02-08    136  |  95<L>  |  25<H>  ----------------------------<  261<H>  4.0   |  26  |  2.02<H>    Ca    7.8<L>      08 Feb 2018 01:27  Phos  3.1     02-08  Mg     2.0     02-08    TPro  6.2  /  Alb  2.4<L>  /  TBili  1.0  /  DBili  x   /  AST  31  /  ALT  214<H>  /  AlkPhos  129<H>  02-08    Creatinine Trend: 2.02<--, 3.78<--, 2.61<--, 3.73<--, 2.67<--, 1.86<--  PT/INR - ( 06 Feb 2018 23:53 )   PT: 11.0 sec;   INR: 1.01 ratio         PTT - ( 06 Feb 2018 23:53 )  PTT:26.1 sec    Arterial Blood Gas:  02-08 @ 01:41  7.51/36/138/29/100/5.7  ABG lactate: --

## 2018-02-08 NOTE — PROGRESS NOTE ADULT - PROBLEM SELECTOR PLAN 1
- please hold NPH when tube feeds are held  - recommend that MICU Team check FS at 2-3 pm as patient received 4 units of insulin at 12 pm when feeds were held  - repeat FS at 2:30 pm is 113  - hold NPH for now as tube feeds are on hold and c/w moderate correction scale q6 hours  - if tube feeds are resumed, recommend resume NPH at 32 units q6 hours with moderate scale when feeds are at goal rate.   - will follow

## 2018-02-08 NOTE — PROGRESS NOTE ADULT - ATTENDING COMMENTS
67F PMH HTN, DM2 (on insulin), breast ca on trastuzumab, s/p b/l mastectomy came to [facility] with Influenza A. She went into acute hypoxic resp failure in the ED requiring intubation (initial esophageal intubation - thereafter corrected to tracheal intubation). Subsequently, she went into PEA cardiac arrest, successfully resuscitated after 5 mins. She then went into PEA arrest again and required 30 mins of CPR before getting ROSC. She received tPA for suspected PE and had 2 chest tubes and a peritoneal drain placed during/after CPR. Course further complicated by septic/cardiogenic shock, RUSS/ATN, shock liver, pneumonia, left pneumothorax and pneumoperitoneum (no intraabdominal viscus perforation is found). 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 She has uncontrolled hyperglycemia.     follows simple commands  improving movement in LUE and RLE, able to move RUE & LLE  f/u TTE with bubble study  continue aspirin, add lipitor for mixed HLD  BP stable  cardiac fn normal on cardiac MRI  tolerating PSV 5/5, will extubate  Ptx resolved after replacing chest tube, continue suction  off abx  she will need evaluation for esophageal injury once extubated   NPO  surgery f/u done for persistent pneumomediastinum - no intervention  s/p dialysis 2/7  avoid hepatotoxic, nephrotoxic meds  continue high dose NPH per endocrine, off lantus  protonix, sc heparin for ppx  PT eval  43 mins critical care time spent

## 2018-02-08 NOTE — PHYSICAL THERAPY INITIAL EVALUATION ADULT - DIAGNOSIS, PT EVAL
Decreased functional mobility d/t impaired cognition, balance, motor control/strength and endurance.

## 2018-02-08 NOTE — PHYSICAL THERAPY INITIAL EVALUATION ADULT - PERTINENT HX OF CURRENT PROBLEM, REHAB EVAL
Pt is a 67 y.o F w PMH of bilateral Breast CA (on Trastuzumab, last dose Jan 20th) admitted to MICU with influenza PNA c/b PEA arrest x 2 with ROSC x 2 (10 min + 30 min). CPR c/b bilateral PTX (s/p bilateral chest tubes), pneumoperitoneum (s/p Peritoneal drain (pigtail) in right hemiabdomen and paracentesis decompression) a/w extensive subcutaneous emphysema. Pt received 100 mg of empiric TPA in ED. Surgery consulted and not recommending surgical intervention.

## 2018-02-08 NOTE — PHYSICAL THERAPY INITIAL EVALUATION ADULT - PRECAUTIONS/LIMITATIONS, REHAB EVAL
aspiration precautions/fall precautions/All 3 chest tubes removed on 1/31. Pt had cardiac MRI on 2/1 which was wnl. Right sided IJ removed and right shiley placed and HD initiated on 2/1. Abdominal drain removed on 2/1. AM chest xray on 2/2 showed enlargement of previously noted left PTX.  Patients pneumothorax initially resolved on CXR, but has returned.

## 2018-02-08 NOTE — PHYSICAL THERAPY INITIAL EVALUATION ADULT - GENERAL OBSERVATIONS, REHAB EVAL
Pt supine, NAD, +intubated, +chest tube to wall suction, +ICU monitoring, +NGT, +IV, dtr present t/o.

## 2018-02-08 NOTE — PROGRESS NOTE ADULT - ATTENDING COMMENTS
Agree with assessment and plan as above by Dr. English. Reviewed all pertinent labs, glucose values, and imaging studies. Modifications made as indicated above.     Andi Low D.O  [phone]

## 2018-02-08 NOTE — PROGRESS NOTE ADULT - ASSESSMENT
66 y/o F with DM2 on insulin, osteoporosis, HTN, here with acute respiratory failure 2/2 influenza s/p PEA arrest x 2, also with hyperglycemia in setting of uncontrolled DM2 glucose values > 300 (high risk patient with high level decision-making). 68 y/o F with DM2 on insulin, osteoporosis, HTN, here with acute respiratory failure 2/2 influenza s/p PEA arrest x 2, also with hyperglycemia in setting of uncontrolled DM2.

## 2018-02-08 NOTE — PROGRESS NOTE ADULT - ASSESSMENT
67F w PMH of bilateral Breast CA (on Trastuzumab, last dose Jan 20th) admitted to MICU with influenza PNA c/b PEA arrest x 2 with ROSC x 2 (10 min + 30 min). CPR c/b bilateral PTX (s/p bilateral chest tubes), pneumoperitoneum (s/p Peritoneal drain (pigtail) in right hemiabdomen and paracentesis decompression) a/w extensive subcutaneous emphysema. Pt received 100 mg of empiric TPA in ED. Surgery consulted and not recommending surgical intervention.   All 3 chest tubes removed on 1/31. Pt had cardiac MRI on 2/1 which was wnl. Right sided IJ removed and right shiley placed and HD initiated on 2/1. Abdominal drain removed on 2/1. AM chest xray on 2/2 showed enlargement of previously noted left PTX.  Patients pneumothorax initially resolved on CXR, but has returned.     Neurologic:  Alteration of mental status present. Likely due possible structural anoxic brain injury due to prolonged PEA arrest x2 + CTH after TPA showing evidence of new small acute left PCA territory infarct without hemorrhagic conversion. Repeat CTH after 24 hours and on 2/3 showing no acute changes.   MRI of head showed watershed infarcts and L occipital infarct. Patients neurologic status improving- obeys commands and spontanesouly moves BUE, and L leg.     Skin:  Lines: Right IJ Shiley  Decubiti: None    GI:  Diet: trickle feeds  Pt with persistent pneumoperitoneum seen on xray on 2/3. potentially secondary to pneumothorax communication. Imaging showing no evidence of viscous perforation.   GI stress prophylaxis indicated pantoprazole 40IV daily    Metabolic:  BMP showing evidence of RUSS 2/2 ATN 2/2 PEA arrest.   - still anuric- last HD session 12/7.     LIVER FUNCTIONS   Patient had evidence of shock liver 2/2 to prolonged PEA arrest. ALT and AST were both in the thousands, now trending down     Volume Assessment:  No peripheral edema    Hematologic:          DVT prophylaxis with HSQ     Infectious Disease:  s/p oseltamivir for influenza  s/p 7 days of Zosyn 7/7 for possible aspiration PNA    Hemodynamics:  Was on pressors ON, Patient is not currently on pressors.    Cardiovascular:.  Cardiac MRI unremarkable. Shows normal RV and LV function.     Respiratory:  Pt had left chest tube replaced   Chest xray from 2/6: "Left chest tube with reexpansion of lung. No pneumothorax"  f/u AM CXRAY 67F w PMH of bilateral Breast CA (on Trastuzumab, last dose Jan 20th) admitted to MICU with influenza PNA c/b PEA arrest x 2 with ROSC x 2 (10 min + 30 min). CPR c/b bilateral PTX (s/p bilateral chest tubes), pneumoperitoneum (s/p Peritoneal drain (pigtail) in right hemiabdomen and paracentesis decompression) a/w extensive subcutaneous emphysema. Pt received 100 mg of empiric TPA in ED. Surgery consulted and not recommending surgical intervention.   All 3 chest tubes removed on 1/31. Pt had cardiac MRI on 2/1 which was wnl. Right sided IJ removed and right shiley placed and HD initiated on 2/1. Abdominal drain removed on 2/1. AM chest xray on 2/2 showed enlargement of previously noted left PTX.  Patients pneumothorax resolved, still with L chest tube.     Neurologic:  Alteration of mental status present. Likely due possible structural anoxic brain injury due to prolonged PEA arrest x2 + CTH after TPA showing evidence of new small acute left PCA territory infarct without hemorrhagic conversion. Repeat CTH after 24 hours and on 2/3 showing no acute changes.   MRI of head showed watershed infarcts and L occipital infarct. Patients neurologic status improving- obeys commands and spontaneously moves all extremities    Skin:  Lines: Right IJ Shiley  Decubiti: None    GI:  Diet: trickle feeds  Pt with persistent pneumoperitoneum seen on xray on 2/3. potentially secondary to pneumothorax communication. Imaging showing no evidence of viscous perforation.   GI stress prophylaxis indicated pantoprazole 40IV daily    Metabolic:  BMP showing evidence of RUSS 2/2 ATN 2/2 PEA arrest.   - still anuric- last HD session 12/7.     LIVER FUNCTIONS   Patient had evidence of shock liver 2/2 to prolonged PEA arrest. ALT and AST were both in the thousands, now trending down     Volume Assessment:  No peripheral edema    Hematologic:          DVT prophylaxis with HSQ     Infectious Disease:  s/p oseltamivir for influenza  s/p 7 days of Zosyn 7/7 for possible aspiration PNA    Hemodynamics:  Was on pressors ON, Patient is not currently on pressors.    Cardiovascular:.  Cardiac MRI unremarkable. Shows normal RV and LV function.   start Lipitor 40    Respiratory:  Pt had left chest tube replaced   Chest xray from 2/6: "Left chest tube with reexpansion of lung. No pneumothorax"

## 2018-02-09 LAB
ALBUMIN SERPL ELPH-MCNC: 2.3 G/DL — LOW (ref 3.3–5)
ALP SERPL-CCNC: 117 U/L — SIGNIFICANT CHANGE UP (ref 40–120)
ALT FLD-CCNC: 142 U/L RC — HIGH (ref 10–45)
ANION GAP SERPL CALC-SCNC: 12 MMOL/L — SIGNIFICANT CHANGE UP (ref 5–17)
APTT BLD: 26.2 SEC — LOW (ref 27.5–37.4)
APTT BLD: 29.6 SEC — SIGNIFICANT CHANGE UP (ref 27.5–37.4)
AST SERPL-CCNC: 25 U/L — SIGNIFICANT CHANGE UP (ref 10–40)
BILIRUB SERPL-MCNC: 1.1 MG/DL — SIGNIFICANT CHANGE UP (ref 0.2–1.2)
BUN SERPL-MCNC: 48 MG/DL — HIGH (ref 7–23)
CALCIUM SERPL-MCNC: 8.5 MG/DL — SIGNIFICANT CHANGE UP (ref 8.4–10.5)
CHLORIDE SERPL-SCNC: 96 MMOL/L — SIGNIFICANT CHANGE UP (ref 96–108)
CO2 SERPL-SCNC: 28 MMOL/L — SIGNIFICANT CHANGE UP (ref 22–31)
CREAT SERPL-MCNC: 3.59 MG/DL — HIGH (ref 0.5–1.3)
GLUCOSE BLDC GLUCOMTR-MCNC: 116 MG/DL — HIGH (ref 70–99)
GLUCOSE BLDC GLUCOMTR-MCNC: 146 MG/DL — HIGH (ref 70–99)
GLUCOSE BLDC GLUCOMTR-MCNC: 170 MG/DL — HIGH (ref 70–99)
GLUCOSE BLDC GLUCOMTR-MCNC: 179 MG/DL — HIGH (ref 70–99)
GLUCOSE BLDC GLUCOMTR-MCNC: 204 MG/DL — HIGH (ref 70–99)
GLUCOSE BLDC GLUCOMTR-MCNC: 94 MG/DL — SIGNIFICANT CHANGE UP (ref 70–99)
GLUCOSE SERPL-MCNC: 115 MG/DL — HIGH (ref 70–99)
HCT VFR BLD CALC: 24 % — LOW (ref 34.5–45)
HCT VFR BLD CALC: 26.8 % — LOW (ref 34.5–45)
HGB BLD-MCNC: 8.2 G/DL — LOW (ref 11.5–15.5)
HGB BLD-MCNC: 8.6 G/DL — LOW (ref 11.5–15.5)
INR BLD: 0.98 RATIO — SIGNIFICANT CHANGE UP (ref 0.88–1.16)
INR BLD: 1.01 RATIO — SIGNIFICANT CHANGE UP (ref 0.88–1.16)
MAGNESIUM SERPL-MCNC: 2.4 MG/DL — SIGNIFICANT CHANGE UP (ref 1.6–2.6)
MCHC RBC-ENTMCNC: 29 PG — SIGNIFICANT CHANGE UP (ref 27–34)
MCHC RBC-ENTMCNC: 31.4 PG — SIGNIFICANT CHANGE UP (ref 27–34)
MCHC RBC-ENTMCNC: 32 GM/DL — SIGNIFICANT CHANGE UP (ref 32–36)
MCHC RBC-ENTMCNC: 34 GM/DL — SIGNIFICANT CHANGE UP (ref 32–36)
MCV RBC AUTO: 90.7 FL — SIGNIFICANT CHANGE UP (ref 80–100)
MCV RBC AUTO: 92.2 FL — SIGNIFICANT CHANGE UP (ref 80–100)
PHOSPHATE SERPL-MCNC: 6.7 MG/DL — HIGH (ref 2.5–4.5)
PLATELET # BLD AUTO: 297 K/UL — SIGNIFICANT CHANGE UP (ref 150–400)
PLATELET # BLD AUTO: 320 K/UL — SIGNIFICANT CHANGE UP (ref 150–400)
POTASSIUM SERPL-MCNC: 4.7 MMOL/L — SIGNIFICANT CHANGE UP (ref 3.5–5.3)
POTASSIUM SERPL-SCNC: 4.7 MMOL/L — SIGNIFICANT CHANGE UP (ref 3.5–5.3)
PROT SERPL-MCNC: 6.1 G/DL — SIGNIFICANT CHANGE UP (ref 6–8.3)
PROTHROM AB SERPL-ACNC: 10.7 SEC — SIGNIFICANT CHANGE UP (ref 9.8–12.7)
PROTHROM AB SERPL-ACNC: 10.9 SEC — SIGNIFICANT CHANGE UP (ref 9.8–12.7)
RBC # BLD: 2.61 M/UL — LOW (ref 3.8–5.2)
RBC # BLD: 2.95 M/UL — LOW (ref 3.8–5.2)
RBC # FLD: 13.9 % — SIGNIFICANT CHANGE UP (ref 10.3–14.5)
RBC # FLD: 14.3 % — SIGNIFICANT CHANGE UP (ref 10.3–14.5)
SODIUM SERPL-SCNC: 136 MMOL/L — SIGNIFICANT CHANGE UP (ref 135–145)
WBC # BLD: 12.8 K/UL — HIGH (ref 3.8–10.5)
WBC # BLD: 9.2 K/UL — SIGNIFICANT CHANGE UP (ref 3.8–10.5)
WBC # FLD AUTO: 12.8 K/UL — HIGH (ref 3.8–10.5)
WBC # FLD AUTO: 9.2 K/UL — SIGNIFICANT CHANGE UP (ref 3.8–10.5)

## 2018-02-09 PROCEDURE — 99232 SBSQ HOSP IP/OBS MODERATE 35: CPT | Mod: GC

## 2018-02-09 PROCEDURE — 71045 X-RAY EXAM CHEST 1 VIEW: CPT | Mod: 26,77

## 2018-02-09 PROCEDURE — 71045 X-RAY EXAM CHEST 1 VIEW: CPT | Mod: 26

## 2018-02-09 PROCEDURE — 99291 CRITICAL CARE FIRST HOUR: CPT

## 2018-02-09 RX ORDER — HUMAN INSULIN 100 [IU]/ML
28 INJECTION, SUSPENSION SUBCUTANEOUS EVERY 6 HOURS
Qty: 0 | Refills: 0 | Status: DISCONTINUED | OUTPATIENT
Start: 2018-02-09 | End: 2018-02-11

## 2018-02-09 RX ORDER — DEXTROSE 50 % IN WATER 50 %
25 SYRINGE (ML) INTRAVENOUS ONCE
Qty: 0 | Refills: 0 | Status: DISCONTINUED | OUTPATIENT
Start: 2018-02-09 | End: 2018-02-21

## 2018-02-09 RX ORDER — ATORVASTATIN CALCIUM 80 MG/1
40 TABLET, FILM COATED ORAL AT BEDTIME
Qty: 0 | Refills: 0 | Status: DISCONTINUED | OUTPATIENT
Start: 2018-02-09 | End: 2018-02-15

## 2018-02-09 RX ORDER — HYDRALAZINE HCL 50 MG
10 TABLET ORAL ONCE
Qty: 0 | Refills: 0 | Status: COMPLETED | OUTPATIENT
Start: 2018-02-09 | End: 2018-02-09

## 2018-02-09 RX ORDER — DEXTROSE 50 % IN WATER 50 %
12.5 SYRINGE (ML) INTRAVENOUS ONCE
Qty: 0 | Refills: 0 | Status: DISCONTINUED | OUTPATIENT
Start: 2018-02-09 | End: 2018-02-21

## 2018-02-09 RX ORDER — CARVEDILOL PHOSPHATE 80 MG/1
12.5 CAPSULE, EXTENDED RELEASE ORAL EVERY 12 HOURS
Qty: 0 | Refills: 0 | Status: DISCONTINUED | OUTPATIENT
Start: 2018-02-09 | End: 2018-02-09

## 2018-02-09 RX ORDER — DEXTROSE 50 % IN WATER 50 %
1 SYRINGE (ML) INTRAVENOUS ONCE
Qty: 0 | Refills: 0 | Status: DISCONTINUED | OUTPATIENT
Start: 2018-02-09 | End: 2018-02-21

## 2018-02-09 RX ORDER — GLUCAGON INJECTION, SOLUTION 0.5 MG/.1ML
1 INJECTION, SOLUTION SUBCUTANEOUS ONCE
Qty: 0 | Refills: 0 | Status: DISCONTINUED | OUTPATIENT
Start: 2018-02-09 | End: 2018-02-21

## 2018-02-09 RX ORDER — CARVEDILOL PHOSPHATE 80 MG/1
12.5 CAPSULE, EXTENDED RELEASE ORAL EVERY 12 HOURS
Qty: 0 | Refills: 0 | Status: DISCONTINUED | OUTPATIENT
Start: 2018-02-09 | End: 2018-02-15

## 2018-02-09 RX ORDER — HUMAN INSULIN 100 [IU]/ML
32 INJECTION, SUSPENSION SUBCUTANEOUS EVERY 6 HOURS
Qty: 0 | Refills: 0 | Status: DISCONTINUED | OUTPATIENT
Start: 2018-02-09 | End: 2018-02-09

## 2018-02-09 RX ORDER — SODIUM CHLORIDE 9 MG/ML
1000 INJECTION, SOLUTION INTRAVENOUS
Qty: 0 | Refills: 0 | Status: DISCONTINUED | OUTPATIENT
Start: 2018-02-09 | End: 2018-02-21

## 2018-02-09 RX ORDER — ASPIRIN/CALCIUM CARB/MAGNESIUM 324 MG
81 TABLET ORAL DAILY
Qty: 0 | Refills: 0 | Status: DISCONTINUED | OUTPATIENT
Start: 2018-02-09 | End: 2018-02-21

## 2018-02-09 RX ADMIN — Medication 3 MILLILITER(S): at 11:35

## 2018-02-09 RX ADMIN — Medication 10 MILLIGRAM(S): at 09:26

## 2018-02-09 RX ADMIN — HEPARIN SODIUM 5000 UNIT(S): 5000 INJECTION INTRAVENOUS; SUBCUTANEOUS at 00:21

## 2018-02-09 RX ADMIN — HEPARIN SODIUM 5000 UNIT(S): 5000 INJECTION INTRAVENOUS; SUBCUTANEOUS at 17:07

## 2018-02-09 RX ADMIN — PANTOPRAZOLE SODIUM 40 MILLIGRAM(S): 20 TABLET, DELAYED RELEASE ORAL at 11:36

## 2018-02-09 RX ADMIN — Medication 81 MILLIGRAM(S): at 17:24

## 2018-02-09 RX ADMIN — HEPARIN SODIUM 5000 UNIT(S): 5000 INJECTION INTRAVENOUS; SUBCUTANEOUS at 07:12

## 2018-02-09 RX ADMIN — Medication 3 MILLILITER(S): at 23:44

## 2018-02-09 RX ADMIN — ATORVASTATIN CALCIUM 40 MILLIGRAM(S): 80 TABLET, FILM COATED ORAL at 22:10

## 2018-02-09 RX ADMIN — Medication 3 MILLILITER(S): at 17:31

## 2018-02-09 RX ADMIN — Medication 3 MILLILITER(S): at 00:02

## 2018-02-09 RX ADMIN — HUMAN INSULIN 28 UNIT(S): 100 INJECTION, SUSPENSION SUBCUTANEOUS at 22:30

## 2018-02-09 RX ADMIN — Medication 3 MILLILITER(S): at 06:08

## 2018-02-09 RX ADMIN — CARVEDILOL PHOSPHATE 12.5 MILLIGRAM(S): 80 CAPSULE, EXTENDED RELEASE ORAL at 17:24

## 2018-02-09 RX ADMIN — Medication 4: at 11:47

## 2018-02-09 RX ADMIN — PANTOPRAZOLE SODIUM 40 MILLIGRAM(S): 20 TABLET, DELAYED RELEASE ORAL at 00:21

## 2018-02-09 NOTE — PROGRESS NOTE ADULT - ASSESSMENT
67F w PMH of bilateral Breast CA (on Trastuzumab, last dose Jan 20th) admitted to MICU with influenza PNA c/b PEA arrest x 2 with ROSC x 2 (10 min + 30 min). CPR c/b bilateral PTX (s/p bilateral chest tubes), pneumoperitoneum (s/p Peritoneal drain (pigtail) in right hemiabdomen and paracentesis decompression) a/w extensive subcutaneous emphysema. Pt received 100 mg of empiric TPA in ED. Surgery consulted and not recommending surgical intervention.   All 3 chest tubes removed on 1/31. Pt had cardiac MRI on 2/1 which was wnl. Right sided IJ removed and right shiley placed and HD initiated on 2/1. Abdominal drain removed on 2/1. AM chest xray on 2/2 showed enlargement of previously noted left PTX.  Patients pneumothorax resolved, still with L chest tube.     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 Patients neurologic status improving- obeys commands and spontaneously moves all extremities. Alert and Oriented x3    Skin:  Lines: Right IJ Shiley  Decubiti: None    GI:  NPO for now as extubated yesterday  spoke with GI felloe- ok to put NG tube as long as perforation not expected    Metabolic:  BMP showing evidence of RUSS 2/2 ATN 2/2 PEA arrest.   - still anuric- plan for HD today    LIVER FUNCTIONS   Patient had evidence of shock liver 2/2 to prolonged PEA arrest. ALT and AST were both in the thousands, now trending down     Volume Assessment:  No peripheral edema    Hematologic:          DVT prophylaxis with HSQ     Infectious Disease:  s/p oseltamivir for influenza  s/p 7 days of Zosyn 7/7 for possible aspiration PNA  afebrile without sings of infection off abx    Hemodynamics:  hemodynamically stable off pressors    Cardiovascular:.  Cardiac MRI unremarkable. Shows normal RV and LV function.   c/w Lipitor 40 once patient is no longer NPO    Respiratory:  Pt had left chest tube replaced   Chest xray from 2/6: "Left chest tube with reexpansion of lung. No pneumothorax"  chest tube on water suction today- will get CXR in a few hours and possibly remove later today

## 2018-02-09 NOTE — PROGRESS NOTE ADULT - SUBJECTIVE AND OBJECTIVE BOX
Interval Events: NPH d/c because patient is NPO. Patient on dextrose drip cause NPO ON. Still not making urine.     REVIEW OF SYSTEMS:  Constitutional: [ x] negative [ ] fevers [ ] chills [ ] weight loss [ ] weight gain  HEENT: [x ] negative [ ] dry eyes [ ] eye irritation [ ] postnasal drip [ ] nasal congestion  CV: [x ] negative  [ ] chest pain [ ] orthopnea [ ] palpitations [ ] murmur  Resp: [x ] negative [ ] cough [ ] shortness of breath [ ] dyspnea [ ] wheezing [ ] sputum [ ] hemoptysis  GI: [ x] negative [ ] nausea [ ] vomiting [ ] diarrhea [ ] constipation [ ] abd pain [ ] dysphagia   : [ ] negative [ ] dysuria [ ] nocturia [ ] hematuria [ ] increased urinary frequency  Musculoskeletal: [x ] negative [ ] back pain [ ] myalgias [ ] arthralgias [ ] fracture  Skin: [x ] negative [ ] rash [ ] itch  Neurological: [ x] negative [ ] headache [ ] dizziness [ ] syncope [ ] weakness [ ] numbness  Psychiatric: [x ] negative [ ] anxiety [ ] depression  Endocrine: [x ] negative [ ] diabetes [ ] thyroid problem  Hematologic/Lymphatic: [ ] negative [ ] anemia [ ] bleeding problem  Allergic/Immunologic: [ ] negative [ ] itchy eyes [ ] nasal discharge [ ] hives [ ] angioedema  [ ] All other systems negative  [ ] Unable to assess ROS because ________    OBJECTIVE:  ICU Vital Signs Last 24 Hrs  T(C): 36.7 (09 Feb 2018 11:00), Max: 36.9 (08 Feb 2018 16:00)  T(F): 98 (09 Feb 2018 11:00), Max: 98.4 (08 Feb 2018 16:00)  HR: 105 (09 Feb 2018 11:36) (83 - 995)  BP: 172/68 (09 Feb 2018 11:00) (128/59 - 187/80)  BP(mean): 98 (09 Feb 2018 11:00) (80 - 115)  ABP: --  ABP(mean): --  RR: 26 (09 Feb 2018 11:00) (20 - 26)  SpO2: 100% (09 Feb 2018 11:36) (96% - 100%)    Mode: CPAP with PS, FiO2: 30, PEEP: 5, PS: 5, MAP: 7, PIP: 11    02-08 @ 07:01 - 02-09 @ 07:00  --------------------------------------------------------  IN: 580 mL / OUT: 119 mL / NET: 461 mL    02-09 @ 07:01 - 02-09 @ 11:38  --------------------------------------------------------  IN: 50 mL / OUT: 0 mL / NET: 50 mL      CAPILLARY BLOOD GLUCOSE      POCT Blood Glucose.: 146 mg/dL (09 Feb 2018 05:30)      PHYSICAL EXAM:  General: NAD  HEENT: AT/NC  Respiratory: clear to ascultation anteriorly, chest tube present on L side  Cardiovascular: RRR, (+) S1/S2  Abdomen: soft- non-tender  Extremities: no edema, clubbing, cyanosis  Skin: clean ,dry, intact  Neurological: spontaneously moves all extremities, obeys commands  Psychiatry: alert and oriented x3    LINES:    HOSPITAL MEDICATIONS:  aspirin  chewable 81 milliGRAM(s) Oral daily  heparin  Injectable 5000 Unit(s) SubCutaneous every 8 hours        atorvastatin 40 milliGRAM(s) Oral at bedtime  insulin lispro (HumaLOG) corrective regimen sliding scale   SubCutaneous every 6 hours    ALBUTerol/ipratropium for Nebulization 3 milliLiter(s) Nebulizer every 6 hours      pantoprazole  Injectable 40 milliGRAM(s) IV Push every 12 hours        dextrose 5% + sodium chloride 0.9%. 1000 milliLiter(s) IV Continuous <Continuous>      chlorhexidine 0.12% Liquid 15 milliLiter(s) Swish and Spit every 4 hours PRN        LABS:                        8.6    12.8  )-----------( 320      ( 09 Feb 2018 00:00 )             26.8     Hgb Trend: 8.6<--, 8.7<--, 8.5<--, 8.9<--, 8.4<--  02-09    136  |  96  |  48<H>  ----------------------------<  115<H>  4.7   |  28  |  3.59<H>    Ca    8.5      09 Feb 2018 00:00  Phos  6.7     02-09  Mg     2.4     02-09    TPro  6.1  /  Alb  2.3<L>  /  TBili  1.1  /  DBili  x   /  AST  25  /  ALT  142<H>  /  AlkPhos  117  02-09    Creatinine Trend: 3.59<--, 2.02<--, 3.78<--, 2.61<--, 3.73<--, 2.67<--  PT/INR - ( 09 Feb 2018 00:00 )   PT: 10.7 sec;   INR: 0.98 ratio         PTT - ( 09 Feb 2018 00:00 )  PTT:26.2 sec    Arterial Blood Gas:  02-08 @ 18:27  7.42/46/135/30/100/5.0  ABG lactate: --  Arterial Blood Gas:  02-08 @ 13:14  7.45/42/101/29/99/4.9  ABG lactate: --  Arterial Blood Gas:  02-08 @ 01:41  7.51/36/138/29/100/5.7  ABG lactate: --

## 2018-02-09 NOTE — PROGRESS NOTE ADULT - ATTENDING COMMENTS
Agree with assessment and plan as above by Dr. English. Reviewed all pertinent labs, glucose values, and imaging studies. Modifications made as indicated above.     Andi Low D.O  [phone]

## 2018-02-09 NOTE — OCCUPATIONAL THERAPY INITIAL EVALUATION ADULT - PLANNED THERAPY INTERVENTIONS, OT EVAL
balance training/motor coordination training/IADL retraining/cognitive, visual perceptual/joint mobilization/neuromuscular re-education/ADL retraining/bed mobility training/fine motor coordination training/strengthening/parent/caregiver training.../transfer training

## 2018-02-09 NOTE — OCCUPATIONAL THERAPY INITIAL EVALUATION ADULT - ADDITIONAL COMMENTS
after she PEA arrested again, with ROSC achieved after 30min with Epi x 5. Pt was also found to have bilateral pneumothoraces, s/p 3 chest tubes. Pt also has paracentesis drain for decompression of the abdomen.  X-Ray Chest 2/8: The heart is slightly enlarged. Right lower lobe pneumonia and/or atelectasis. All life supporting devices are in good position. Small left pneumothorax cannot be ruled out. Subcutaneous emphysema is seen on the left as well.  MR Head 2/6: Bilateral acute centrum semiovale ovale watershed infarcts. Acute left occipital lobe infarct.

## 2018-02-09 NOTE — OCCUPATIONAL THERAPY INITIAL EVALUATION ADULT - DIAGNOSIS, OT EVAL
Pt with impaired cognition, motor control, strength, balance, coordination, vision? impacting pt's ability to complete ADLs, functional mobility.

## 2018-02-09 NOTE — PROGRESS NOTE ADULT - SUBJECTIVE AND OBJECTIVE BOX
[facility] DIVISION OF KIDNEY DISEASES AND HYPERTENSION -- FOLLOW UP NOTE  --------------------------------------------------------------------------------  Chief Complaint: RUSS, cardiac arrest    24 hour events/subjective:          PAST HISTORY  --------------------------------------------------------------------------------  No significant changes to PMH, PSH, FHx, SHx, unless otherwise noted    ALLERGIES & MEDICATIONS  --------------------------------------------------------------------------------  Allergies    NIFEdipine (Urticaria; Hives)  vitamin E (Short breath; Urticaria; Hives)    Intolerances      Standing Inpatient Medications  ALBUTerol/ipratropium for Nebulization 3 milliLiter(s) Nebulizer every 6 hours  aspirin  chewable 81 milliGRAM(s) Oral daily  atorvastatin 40 milliGRAM(s) Oral at bedtime  dextrose 5% + sodium chloride 0.9%. 1000 milliLiter(s) IV Continuous <Continuous>  heparin  Injectable 5000 Unit(s) SubCutaneous every 8 hours  insulin lispro (HumaLOG) corrective regimen sliding scale   SubCutaneous every 6 hours  pantoprazole  Injectable 40 milliGRAM(s) IV Push every 12 hours    PRN Inpatient Medications  chlorhexidine 0.12% Liquid 15 milliLiter(s) Swish and Spit every 4 hours PRN      REVIEW OF SYSTEMS  --------------------------------------------------------------------------------  Gen: No weight changes, fatigue, fevers/chills, weakness  Skin: No rashes  Head/Eyes/Ears/Mouth: No headache; Normal hearing; Normal vision w/o blurriness; No sinus pain/discomfort, sore throat  Respiratory: No dyspnea, cough, wheezing, hemoptysis  CV: No chest pain, PND, orthopnea  GI: No abdominal pain, diarrhea, constipation, nausea, vomiting, melena, hematochezia  : No increased frequency, dysuria, hematuria, nocturia  MSK: No joint pain/swelling; no back pain; no edema  Neuro: No dizziness/lightheadedness, weakness, seizures, numbness, tingling  Heme: No easy bruising or bleeding  Endo: No heat/cold intolerance  Psych: No significant nervousness, anxiety, stress, depression    All other systems were reviewed and are negative, except as noted.    VITALS/PHYSICAL EXAM  --------------------------------------------------------------------------------  T(C): 36.8 (02-09-18 @ 08:00), Max: 36.9 (02-08-18 @ 16:00)  HR: 95 (02-09-18 @ 09:28) (83 - 995)  BP: 170/75 (02-09-18 @ 09:28) (128/59 - 187/80)  RR: 26 (02-09-18 @ 07:00) (20 - 26)  SpO2: 97% (02-09-18 @ 09:28) (97% - 100%)  Wt(kg): --        02-08-18 @ 07:01  -  02-09-18 @ 07:00  --------------------------------------------------------  IN: 580 mL / OUT: 119 mL / NET: 461 mL    02-09-18 @ 07:01  -  02-09-18 @ 09:45  --------------------------------------------------------  IN: 50 mL / OUT: 0 mL / NET: 50 mL      Physical Exam:  	Gen: NAD, well-appearing  	HEENT: PERRL, supple neck, clear oropharynx  	Pulm: CTA B/L  	CV: RRR, S1S2; no rub  	Back: No spinal or CVA tenderness; no sacral edema  	Abd: +BS, soft, nontender/nondistended  	: No suprapubic tenderness  	UE: Warm, FROM, no clubbing, intact strength; no edema; no asterixis  	LE: Warm, FROM, no clubbing, intact strength; no edema  	Neuro: No focal deficits, intact gait  	Psych: Normal affect and mood  	Skin: Warm, without rashes  	Vascular access:    LABS/STUDIES  --------------------------------------------------------------------------------              8.6    12.8  >-----------<  320      [02-09-18 @ 00:00]              26.8     136  |  96  |  48  ----------------------------<  115      [02-09-18 @ 00:00]  4.7   |  28  |  3.59        Ca     8.5     [02-09-18 @ 00:00]      Mg     2.4     [02-09-18 @ 00:00]      Phos  6.7     [02-09-18 @ 00:00]    TPro  6.1  /  Alb  2.3  /  TBili  1.1  /  DBili  x   /  AST  25  /  ALT  142  /  AlkPhos  117  [02-09-18 @ 00:00]    PT/INR: PT 10.7 , INR 0.98       [02-09-18 @ 00:00]  PTT: 26.2       [02-09-18 @ 00:00]      Creatinine Trend:  SCr 3.59 [02-09 @ 00:00]  SCr 2.02 [02-08 @ 01:27]  SCr 3.78 [02-06 @ 23:53]  SCr 2.61 [02-06 @ 00:08]  SCr 3.73 [02-04 @ 23:53]    Urinalysis - [01-30-18 @ 12:52]      Color Yellow / Appearance SL Turbid / SG 1.025 / pH 6.0      Gluc 50 / Ketone Negative  / Bili Negative / Urobili Negative       Blood Trace / Protein 150 / Leuk Est Negative / Nitrite Negative      RBC 3-5 / WBC  / Hyaline  / Gran  / Sq Epi  / Non Sq Epi OCC / Bacteria       HbA1c 8.6      [01-31-18 @ 07:15]  Lipid: chol 183, , HDL 10,       [02-07-18 @ 13:34]    HBsAg Nonreact      [02-01-18 @ 22:49]  HCV 0.16, Nonreact      [02-01-18 @ 22:49]

## 2018-02-09 NOTE — OCCUPATIONAL THERAPY INITIAL EVALUATION ADULT - IMPAIRED TRANSFERS: SIT/STAND, REHAB EVAL
impaired motor control/decreased strength/cognition/impaired postural control/impaired coordination/impaired balance

## 2018-02-09 NOTE — PROGRESS NOTE ADULT - ASSESSMENT
68 y/o F with DM2 on insulin, osteoporosis, HTN, here with acute respiratory failure 2/2 influenza s/p PEA arrest x 2, also with hyperglycemia in setting of uncontrolled DM2.

## 2018-02-09 NOTE — PROGRESS NOTE ADULT - SUBJECTIVE AND OBJECTIVE BOX
Chief Complaint: f/u DM2    History:  Patient successfully extubated yesterday. Currently being dialyzed at bedside. No longer on D5 or tube feeds. She is awake at bedside but minimally verbal, denies pain.     MEDICATIONS  (STANDING):  ALBUTerol/ipratropium for Nebulization 3 milliLiter(s) Nebulizer every 6 hours  aspirin  chewable 81 milliGRAM(s) Oral daily  atorvastatin 40 milliGRAM(s) Oral at bedtime  heparin  Injectable 5000 Unit(s) SubCutaneous every 8 hours  insulin lispro (HumaLOG) corrective regimen sliding scale   SubCutaneous every 6 hours  pantoprazole  Injectable 40 milliGRAM(s) IV Push every 12 hours    MEDICATIONS  (PRN):  chlorhexidine 0.12% Liquid 15 milliLiter(s) Swish and Spit every 4 hours PRN mouth hygiene      Allergies    NIFEdipine (Urticaria; Hives)  vitamin E (Short breath; Urticaria; Hives)        Review of Systems:  UNABLE TO OBTAIN - patient minimally verbal at bedside    PHYSICAL EXAM:  VITALS: T(C): 36.6 (02-09-18 @ 11:55)  T(F): 97.9 (02-09-18 @ 11:55), Max: 98.4 (02-08-18 @ 16:00)  HR: 102 (02-09-18 @ 13:00) (88 - 105)  BP: 145/67 (02-09-18 @ 13:00) (128/59 - 187/80)  RR:  (20 - 26)  SpO2:  (95% - 100%)  Wt(kg): --  GENERAL: NAD, well-developed  EYES: No proptosis, anicteric  HEENT:  Atraumatic, Normocephalic  RESPIRATORY: Clear to auscultation bilaterally; No rales, rhonchi, wheezing, or rubs  CARDIOVASCULAR: Regular rate and rhythm; No murmurs  GI: Soft, nontender, non distended  SKIN: Dry, intact,      POCT Blood Glucose.: 204 mg/dL (02-09-18 @ 11:40) H4  POCT Blood Glucose.: 146 mg/dL (02-09-18 @ 05:30)  POCT Blood Glucose.: 170 mg/dL (02-09-18 @ 05:29)  POCT Blood Glucose.: 116 mg/dL (02-09-18 @ 02:53)  POCT Blood Glucose.: 114 mg/dL (02-08-18 @ 23:20)  POCT Blood Glucose.: 103 mg/dL (02-08-18 @ 22:04)  POCT Blood Glucose.: 114 mg/dL (02-08-18 @ 21:13)  POCT Blood Glucose.: 118 mg/dL (02-08-18 @ 19:42)  POCT Blood Glucose.: 130 mg/dL (02-08-18 @ 18:14)  POCT Blood Glucose.: 70 mg/dL (02-08-18 @ 17:48)  POCT Blood Glucose.: 92 mg/dL (02-08-18 @ 16:32)  POCT Blood Glucose.: 100 mg/dL (02-08-18 @ 15:29)  POCT Blood Glucose.: 113 mg/dL (02-08-18 @ 14:32)  POCT Blood Glucose.: 170 mg/dL (02-08-18 @ 11:23)  POCT Blood Glucose.: 369 mg/dL (02-08-18 @ 05:38)  POCT Blood Glucose.: 182 mg/dL (02-07-18 @ 23:34)  POCT Blood Glucose.: 311 mg/dL (02-07-18 @ 17:17)  POCT Blood Glucose.: 169 mg/dL (02-07-18 @ 11:41)  POCT Blood Glucose.: 326 mg/dL (02-07-18 @ 05:52)  POCT Blood Glucose.: 157 mg/dL (02-07-18 @ 02:01)  POCT Blood Glucose.: 112 mg/dL (02-06-18 @ 23:26)  POCT Blood Glucose.: 309 mg/dL (02-06-18 @ 17:12)      02-09    136  |  96  |  48<H>  ----------------------------<  115<H>  4.7   |  28  |  3.59<H>    EGFR if : 14<L>  EGFR if non : 12<L>    Ca    8.5      02-09  Mg     2.4     02-09  Phos  6.7     02-09    TPro  6.1  /  Alb  2.3<L>  /  TBili  1.1  /  DBili  x   /  AST  25  /  ALT  142<H>  /  AlkPhos  117  02-09          Thyroid Function Tests:      Hemoglobin A1C, Whole Blood: 8.6 % <H> [4.0 - 5.6] (01-31-18 @ 07:15)  Hemoglobin A1C, Whole Blood: 8.7 % <H> [4.0 - 5.6] (01-31-18 @ 05:51)

## 2018-02-09 NOTE — OCCUPATIONAL THERAPY INITIAL EVALUATION ADULT - PERTINENT HX OF CURRENT PROBLEM, REHAB EVAL
66y/o F hx Breast Cancer (diagnosed in Feb 2017) currently on Herceptin,  HTN,  DM,  p/w Fever at home, body weakness, decreased appetite yesterday. Daughter brought the pt to the ER. In the ER, was found to have T max of 100.6. Pt was also found to be RVP positive for flu. Pt then went into PEA arrest w/ chest compressions for 10min, received Rosc after Epi x 2, intubated. Pt required re-intubation (improperly intubated initially into the esophagus)...see below

## 2018-02-09 NOTE — OCCUPATIONAL THERAPY INITIAL EVALUATION ADULT - RANGE OF MOTION EXAMINATION, LOWER EXTREMITY
Right LE Passive ROM was WFL  (within functional limits)/Left LE Active ROM was WFL (within functional limits)

## 2018-02-09 NOTE — PROGRESS NOTE ADULT - ATTENDING COMMENTS
67F PMH HTN, DM2 (on insulin), breast ca on trastuzumab, s/p b/l mastectomy came to [facility] with Influenza A. She went into acute hypoxic resp failure in the ED requiring intubation (initial esophageal intubation - thereafter corrected to tracheal intubation). Subsequently, she went into PEA cardiac arrest, successfully resuscitated after 5 mins. She then went into PEA arrest again and required 30 mins of CPR before getting ROSC. She received tPA for suspected PE and had 2 chest tubes and a peritoneal drain placed during/after CPR. Course further complicated by septic/cardiogenic shock, RUSS/ATN, shock liver, pneumonia, left pneumothorax and pneumoperitoneum (no intraabdominal viscus perforation is found). 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 She has uncontrolled hyperglycemia.     She was successfully extubated on 2/8 with subsequent hypoglycemia due to presence of NPH and being NPO - placed on D5LR overnight with improvement in glucose.     She had slight paradoxical breathing this morning however vitals were ok and she didn't c/o SOB.     monitor neuro status, daily reorientation  improving movement in LUE and RLE, able to move RUE & LLE  TTE poor study, cannot r/o shunt, will defer STEPHANIE to neuro  continue aspirin, lipitor  place chest tube to waterseal, check CXR  continue BDs, start chest PT  will d/w GI re: w/u for esophageal injury, will place NGT for TF/meds if no w/u  she is not ready for a swallow evaluation yet  off abx  dialysis today  transaminitis almost resolved  glucose improved, d/c D5LR, monitor fingersticks, goal glucose 140-180  resume NPH once started on diet, endocrine f/u  sc heparin for DVT ppx  PT eval, OOB  33 mins critical care time spent  likely transfer to floor later today if resp status improved after dialysis

## 2018-02-09 NOTE — PROGRESS NOTE ADULT - PROBLEM SELECTOR PLAN 1
- hold NPH for now as tube feeds are on hold and c/w moderate correction scale q6 hours  - if tube feeds are resumed, recommend resume NPH at 32 units q6 hours with moderate scale only when feeds are at goal rate.   - will follow - hold NPH for now as tube feeds are on hold and c/w moderate correction scale q6 hours  - will follow, please notify us if tube feeds are resumed

## 2018-02-09 NOTE — OCCUPATIONAL THERAPY INITIAL EVALUATION ADULT - LIVES WITH, PROFILE
As per pt's , pt lives in a 2nd floor apartment with her , full flight of stairs to enter with b/l handrails. Pt was independent with ADLs and functional mobility prior to admission. Pt owns raised toilet seat./spouse

## 2018-02-09 NOTE — OCCUPATIONAL THERAPY INITIAL EVALUATION ADULT - IMPAIRMENTS CONTRIBUTING IMPAIRED BED MOBILITY, REHAB EVAL
impaired motor control/cognition/impaired coordination/decreased strength/impaired postural control/impaired balance

## 2018-02-09 NOTE — PROGRESS NOTE ADULT - PROBLEM SELECTOR PLAN 1
likely from ATN in setting of cardiac arrest. Pt continues to be oligo anuric  s/p HD with 1 L UF on 02/07.  Currently with no e/o renal recovery with no urine output. Normal electrolytes.  Schedule for HD with fluid removal today.

## 2018-02-10 LAB
ALBUMIN SERPL ELPH-MCNC: 2.3 G/DL — LOW (ref 3.3–5)
ALP SERPL-CCNC: 130 U/L — HIGH (ref 40–120)
ALT FLD-CCNC: 93 U/L RC — HIGH (ref 10–45)
ANION GAP SERPL CALC-SCNC: 10 MMOL/L — SIGNIFICANT CHANGE UP (ref 5–17)
AST SERPL-CCNC: 24 U/L — SIGNIFICANT CHANGE UP (ref 10–40)
BILIRUB SERPL-MCNC: 1 MG/DL — SIGNIFICANT CHANGE UP (ref 0.2–1.2)
BUN SERPL-MCNC: 28 MG/DL — HIGH (ref 7–23)
CALCIUM SERPL-MCNC: 8.4 MG/DL — SIGNIFICANT CHANGE UP (ref 8.4–10.5)
CHLORIDE SERPL-SCNC: 98 MMOL/L — SIGNIFICANT CHANGE UP (ref 96–108)
CO2 SERPL-SCNC: 28 MMOL/L — SIGNIFICANT CHANGE UP (ref 22–31)
CREAT SERPL-MCNC: 2.51 MG/DL — HIGH (ref 0.5–1.3)
GLUCOSE BLDC GLUCOMTR-MCNC: 162 MG/DL — HIGH (ref 70–99)
GLUCOSE BLDC GLUCOMTR-MCNC: 196 MG/DL — HIGH (ref 70–99)
GLUCOSE BLDC GLUCOMTR-MCNC: 314 MG/DL — HIGH (ref 70–99)
GLUCOSE BLDC GLUCOMTR-MCNC: 92 MG/DL — SIGNIFICANT CHANGE UP (ref 70–99)
GLUCOSE SERPL-MCNC: 203 MG/DL — HIGH (ref 70–99)
HCT VFR BLD CALC: 24.4 % — LOW (ref 34.5–45)
HGB BLD-MCNC: 8 G/DL — LOW (ref 11.5–15.5)
MAGNESIUM SERPL-MCNC: 2.2 MG/DL — SIGNIFICANT CHANGE UP (ref 1.6–2.6)
MCHC RBC-ENTMCNC: 30.3 PG — SIGNIFICANT CHANGE UP (ref 27–34)
MCHC RBC-ENTMCNC: 32.7 GM/DL — SIGNIFICANT CHANGE UP (ref 32–36)
MCV RBC AUTO: 92.8 FL — SIGNIFICANT CHANGE UP (ref 80–100)
PHOSPHATE SERPL-MCNC: 5.7 MG/DL — HIGH (ref 2.5–4.5)
PLATELET # BLD AUTO: 302 K/UL — SIGNIFICANT CHANGE UP (ref 150–400)
POTASSIUM SERPL-MCNC: 4.8 MMOL/L — SIGNIFICANT CHANGE UP (ref 3.5–5.3)
POTASSIUM SERPL-SCNC: 4.8 MMOL/L — SIGNIFICANT CHANGE UP (ref 3.5–5.3)
PROT SERPL-MCNC: 5.9 G/DL — LOW (ref 6–8.3)
RBC # BLD: 2.63 M/UL — LOW (ref 3.8–5.2)
RBC # FLD: 14.5 % — SIGNIFICANT CHANGE UP (ref 10.3–14.5)
SODIUM SERPL-SCNC: 136 MMOL/L — SIGNIFICANT CHANGE UP (ref 135–145)
WBC # BLD: 6.5 K/UL — SIGNIFICANT CHANGE UP (ref 3.8–10.5)
WBC # FLD AUTO: 6.5 K/UL — SIGNIFICANT CHANGE UP (ref 3.8–10.5)

## 2018-02-10 PROCEDURE — 99233 SBSQ HOSP IP/OBS HIGH 50: CPT | Mod: 25,GC

## 2018-02-10 PROCEDURE — 71045 X-RAY EXAM CHEST 1 VIEW: CPT | Mod: 26,76

## 2018-02-10 PROCEDURE — 76604 US EXAM CHEST: CPT | Mod: 26,GC

## 2018-02-10 PROCEDURE — 99232 SBSQ HOSP IP/OBS MODERATE 35: CPT | Mod: GC

## 2018-02-10 RX ORDER — HYDROCORTISONE 1 %
1 OINTMENT (GRAM) TOPICAL DAILY
Qty: 0 | Refills: 0 | Status: DISCONTINUED | OUTPATIENT
Start: 2018-02-10 | End: 2018-02-16

## 2018-02-10 RX ADMIN — CARVEDILOL PHOSPHATE 12.5 MILLIGRAM(S): 80 CAPSULE, EXTENDED RELEASE ORAL at 05:14

## 2018-02-10 RX ADMIN — HUMAN INSULIN 28 UNIT(S): 100 INJECTION, SUSPENSION SUBCUTANEOUS at 05:12

## 2018-02-10 RX ADMIN — HUMAN INSULIN 28 UNIT(S): 100 INJECTION, SUSPENSION SUBCUTANEOUS at 23:04

## 2018-02-10 RX ADMIN — Medication 2: at 17:44

## 2018-02-10 RX ADMIN — CARVEDILOL PHOSPHATE 12.5 MILLIGRAM(S): 80 CAPSULE, EXTENDED RELEASE ORAL at 18:15

## 2018-02-10 RX ADMIN — Medication 1 APPLICATION(S): at 12:54

## 2018-02-10 RX ADMIN — ATORVASTATIN CALCIUM 40 MILLIGRAM(S): 80 TABLET, FILM COATED ORAL at 23:04

## 2018-02-10 RX ADMIN — Medication 3 MILLILITER(S): at 11:32

## 2018-02-10 RX ADMIN — Medication 4: at 00:07

## 2018-02-10 RX ADMIN — HUMAN INSULIN 28 UNIT(S): 100 INJECTION, SUSPENSION SUBCUTANEOUS at 17:43

## 2018-02-10 RX ADMIN — CHLORHEXIDINE GLUCONATE 15 MILLILITER(S): 213 SOLUTION TOPICAL at 23:03

## 2018-02-10 RX ADMIN — Medication 3 MILLILITER(S): at 05:20

## 2018-02-10 RX ADMIN — Medication 3 MILLILITER(S): at 18:30

## 2018-02-10 RX ADMIN — HEPARIN SODIUM 5000 UNIT(S): 5000 INJECTION INTRAVENOUS; SUBCUTANEOUS at 00:06

## 2018-02-10 RX ADMIN — Medication 8: at 23:04

## 2018-02-10 RX ADMIN — Medication 81 MILLIGRAM(S): at 12:54

## 2018-02-10 RX ADMIN — HEPARIN SODIUM 5000 UNIT(S): 5000 INJECTION INTRAVENOUS; SUBCUTANEOUS at 23:04

## 2018-02-10 RX ADMIN — HEPARIN SODIUM 5000 UNIT(S): 5000 INJECTION INTRAVENOUS; SUBCUTANEOUS at 08:26

## 2018-02-10 RX ADMIN — Medication 2: at 05:12

## 2018-02-10 RX ADMIN — HEPARIN SODIUM 5000 UNIT(S): 5000 INJECTION INTRAVENOUS; SUBCUTANEOUS at 17:42

## 2018-02-10 NOTE — PROGRESS NOTE ADULT - PROBLEM SELECTOR PLAN 1
likely from ATN in setting of cardiac arrest. Pt continues to be oligo anuric  s/p HD with 1 L UF on 02/08.  Currently with no e/o renal recovery with no urine output. Normal electrolytes.  No plan for HD today.   Monitor BMP daily. likely from ATN in setting of cardiac arrest. Pt continues to be oligo anuric  Last HD on 02/09.  Currently with no e/o renal recovery with no urine output. Normal electrolytes.  No plan for HD today.   Monitor BMP daily.

## 2018-02-10 NOTE — PROGRESS NOTE ADULT - SUBJECTIVE AND OBJECTIVE BOX
[facility] Division of Kidney Diseases & Hypertension  FOLLOW UP NOTE  [phone]--------------------------------------------------------------------------------  Chief Complaint:Cardiac arrest      24 hour events/subjective:     No acute events overnight.   Had HD on 2/9  Patient responds to name; still anuric.     PAST HISTORY  --------------------------------------------------------------------------------  No significant changes to PMH, PSH, FHx, SHx, unless otherwise noted    ALLERGIES & MEDICATIONS  --------------------------------------------------------------------------------  Allergies    NIFEdipine (Urticaria; Hives)  vitamin E (Short breath; Urticaria; Hives)    Intolerances      Standing Inpatient Medications  ALBUTerol/ipratropium for Nebulization 3 milliLiter(s) Nebulizer every 6 hours  aspirin  chewable 81 milliGRAM(s) Oral daily  atorvastatin 40 milliGRAM(s) Oral at bedtime  carvedilol 12.5 milliGRAM(s) Oral every 12 hours  dextrose 5%. 1000 milliLiter(s) IV Continuous <Continuous>  dextrose 50% Injectable 12.5 Gram(s) IV Push once  dextrose 50% Injectable 25 Gram(s) IV Push once  dextrose 50% Injectable 25 Gram(s) IV Push once  heparin  Injectable 5000 Unit(s) SubCutaneous every 8 hours  hydrocortisone 1% Cream 1 Application(s) Topical daily  insulin lispro (HumaLOG) corrective regimen sliding scale   SubCutaneous every 6 hours  insulin NPH human recombinant 28 Unit(s) SubCutaneous every 6 hours    PRN Inpatient Medications  chlorhexidine 0.12% Liquid 15 milliLiter(s) Swish and Spit every 4 hours PRN  dextrose Gel 1 Dose(s) Oral once PRN  glucagon  Injectable 1 milliGRAM(s) IntraMuscular once PRN      REVIEW OF SYSTEMS  --------------------------------------------------------------------------------  Unable to obtain.     VITALS/PHYSICAL EXAM  --------------------------------------------------------------------------------  T(C): 36.5 (02-10-18 @ 08:00), Max: 36.8 (02-10-18 @ 04:00)  HR: 90 (02-10-18 @ 08:00) (90 - 105)  BP: 149/62 (02-10-18 @ 08:00) (129/58 - 178/77)  RR: 18 (02-10-18 @ 08:00) (18 - 26)  SpO2: 96% (02-10-18 @ 08:00) (95% - 100%)  Wt(kg): --        02-09-18 @ 07:01  -  02-10-18 @ 07:00  --------------------------------------------------------  IN: 650 mL / OUT: 1140 mL / NET: -490 mL      Physical Exam:  	Gen: NAD, well-appearing  	HEENT: scleria anicteric  	Pulm: CTA B/L  	CV: RRR, S1S2;  	Back: No spinal or CVA tenderness  	Abd: +BS, soft, nontender/nondistended              Extremities: no bilateral LE edema noted.                Neuro: Responds to name, able to move extremities   	Skin: Warm, without rashes              Vascular access: Right IJ non tunneled HD catheter present.     LABS/STUDIES  --------------------------------------------------------------------------------              8.2    9.2   >-----------<  297      [02-09-18 @ 23:24]              24.0     136  |  98  |  28  ----------------------------<  203      [02-09-18 @ 23:24]  4.8   |  28  |  2.51        Ca     8.4     [02-09-18 @ 23:24]      Mg     2.2     [02-09-18 @ 23:24]      Phos  5.7     [02-09-18 @ 23:24]    TPro  5.9  /  Alb  2.3  /  TBili  1.0  /  DBili  x   /  AST  24  /  ALT  93  /  AlkPhos  130  [02-09-18 @ 23:24]    PT/INR: PT 10.9 , INR 1.01       [02-09-18 @ 23:24]  PTT: 29.6       [02-09-18 @ 23:24]      Creatinine Trend:  SCr 2.51 [02-09 @ 23:24]  SCr 3.59 [02-09 @ 00:00]  SCr 2.02 [02-08 @ 01:27]  SCr 3.78 [02-06 @ 23:53]  SCr 2.61 [02-06 @ 00:08]        Lipid: chol 183, , HDL 10,       [02-07-18 @ 13:34]

## 2018-02-10 NOTE — PROGRESS NOTE ADULT - ASSESSMENT
67F w PMH of bilateral Breast CA (on Trastuzumab, last dose Jan 20th) admitted to MICU with influenza PNA c/b PEA arrest x 2 with ROSC x 2 (10 min + 30 min). CPR c/b bilateral PTX (s/p bilateral chest tubes), pneumoperitoneum (s/p Peritoneal drain (pigtail) in right hemiabdomen and paracentesis decompression) a/w extensive subcutaneous emphysema. Pt received 100 mg of empiric TPA in ED. Surgery consulted and not recommending surgical intervention.   All 3 chest tubes removed on 1/31. Pt had cardiac MRI on 2/1 which was wnl. Right sided IJ removed and right shiley placed and HD initiated on 2/1. Abdominal drain removed on 2/1.  Patients pneumothorax resolved, still with L chest tube. 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 Patients neurologic status improving- obeys commands and spontaneously moves all extremities. Alert and Oriented x3    Skin:  Lines: Right IJ Shiley  Decubiti: None  Erythematous rash- likely contact dermatitis from ECG leads- topical hydrocortisone     GI:  NG tube inserted yesterday. Feeds restarted    Metabolic:  BMP showing evidence of RUSS 2/2 ATN 2/2 PEA arrest.   - still anuric- last HD yesterday   - renal on case, appreciate recs    LIVER FUNCTIONS   Patient had evidence of shock liver 2/2 to prolonged PEA arrest. ALT and AST were both in the thousands, now trending down     Endocrine  FS well controlled ON  - C/w NPH 28 q6h+ moderate ISS    Volume Assessment:  No peripheral edema    Hematologic:          DVT prophylaxis with HSQ     Infectious Disease:  s/p oseltamivir for influenza  s/p 7 days of Zosyn 7/7 for possible aspiration PNA  afebrile without sings of infection off abx    Hemodynamics:  hemodynamically stable off pressors    Cardiovascular:.  Cardiac MRI unremarkable. Shows normal RV and LV function.   c/w Lipitor 40 once patient is no longer NPO  patient was hypertensive yesterday- restarted coreg 12.5mg BID    Respiratory:  Pt had left chest tube replaced   Chest xray from 2/6: "Left chest tube with reexpansion of lung. No pneumothorax"  chest tube on water suction yesterday- still no pneumothorax, US today showed lung sliding. Will clamp chest tube today, repeat CXR and possibly remove L chest tube  c/w matilde

## 2018-02-10 NOTE — PROGRESS NOTE ADULT - SUBJECTIVE AND OBJECTIVE BOX
Interval Events: Patient had NG tube placed and feeds restarted. Patient had chest tube placed on water seal and Chest XRAY showed no pneumothorax. Still not making urine.     REVIEW OF SYSTEMS:  Constitutional: [ ] negative [ ] fevers [ ] chills [ ] weight loss [ ] weight gain  HEENT: [x ] negative [ ] dry eyes [ ] eye irritation [ ] postnasal drip [ ] nasal congestion  CV: [ x] negative  [ ] chest pain [ ] orthopnea [ ] palpitations [ ] murmur  Resp: [ ] negative [ ] cough [ ] shortness of breath [ ] dyspnea [ ] wheezing [ ] sputum [ ] hemoptysis  GI: [ ] negative [ ] nausea [ ] vomiting [ ] diarrhea [ ] constipation [ ] abd pain [ ] dysphagia   : [ ] negative [ ] dysuria [ ] nocturia [ ] hematuria [ ] increased urinary frequency  Musculoskeletal: [x ] negative [ ] back pain [ ] myalgias [ ] arthralgias [ ] fracture  Skin: [x ] negative [ ] rash [ ] itch  Neurological: [ ] negative [ ] headache [ ] dizziness [ ] syncope [ ] weakness [ ] numbness  Psychiatric: [ ] negative [ ] anxiety [ ] depression  Endocrine: [ ] negative [ ] diabetes [ ] thyroid problem  Hematologic/Lymphatic: [ ] negative [ ] anemia [ ] bleeding problem  Allergic/Immunologic: [ ] negative [ ] itchy eyes [ ] nasal discharge [ ] hives [ ] angioedema  [ ] All other systems negative  [ ] Unable to assess ROS because ________    OBJECTIVE:  ICU Vital Signs Last 24 Hrs  T(C): 36.5 (10 Feb 2018 08:00), Max: 36.8 (10 Feb 2018 04:00)  T(F): 97.7 (10 Feb 2018 08:00), Max: 98.2 (10 Feb 2018 04:00)  HR: 90 (10 Feb 2018 08:00) (90 - 105)  BP: 149/62 (10 Feb 2018 08:00) (129/58 - 180/74)  BP(mean): 89 (10 Feb 2018 08:00) (83 - 110)  ABP: --  ABP(mean): --  RR: 18 (10 Feb 2018 08:00) (18 - 26)  SpO2: 96% (10 Feb 2018 08:00) (95% - 100%)        02-09 @ 07:01  -  02-10 @ 07:00  --------------------------------------------------------  IN: 650 mL / OUT: 1140 mL / NET: -490 mL      CAPILLARY BLOOD GLUCOSE      POCT Blood Glucose.: 196 mg/dL (10 Feb 2018 04:51)      PHYSICAL EXAM:  General:   HEENT:   Lymph Nodes:  Neck:   Respiratory:   Cardiovascular:   Abdomen:   Extremities:   Skin:   Neurological:  Psychiatry:    LINES:    HOSPITAL MEDICATIONS:  aspirin  chewable 81 milliGRAM(s) Oral daily  heparin  Injectable 5000 Unit(s) SubCutaneous every 8 hours      carvedilol 12.5 milliGRAM(s) Oral every 12 hours    atorvastatin 40 milliGRAM(s) Oral at bedtime  dextrose 50% Injectable 12.5 Gram(s) IV Push once  dextrose 50% Injectable 25 Gram(s) IV Push once  dextrose 50% Injectable 25 Gram(s) IV Push once  dextrose Gel 1 Dose(s) Oral once PRN  glucagon  Injectable 1 milliGRAM(s) IntraMuscular once PRN  insulin lispro (HumaLOG) corrective regimen sliding scale   SubCutaneous every 6 hours  insulin NPH human recombinant 28 Unit(s) SubCutaneous every 6 hours    ALBUTerol/ipratropium for Nebulization 3 milliLiter(s) Nebulizer every 6 hours            dextrose 5%. 1000 milliLiter(s) IV Continuous <Continuous>      chlorhexidine 0.12% Liquid 15 milliLiter(s) Swish and Spit every 4 hours PRN        LABS:                        8.2    9.2   )-----------( 297      ( 09 Feb 2018 23:24 )             24.0     Hgb Trend: 8.2<--, 8.6<--, 8.7<--, 8.5<--, 8.9<--  02-09    136  |  98  |  28<H>  ----------------------------<  203<H>  4.8   |  28  |  2.51<H>    Ca    8.4      09 Feb 2018 23:24  Phos  5.7     02-09  Mg     2.2     02-09    TPro  5.9<L>  /  Alb  2.3<L>  /  TBili  1.0  /  DBili  x   /  AST  24  /  ALT  93<H>  /  AlkPhos  130<H>  02-09    Creatinine Trend: 2.51<--, 3.59<--, 2.02<--, 3.78<--, 2.61<--, 3.73<--  PT/INR - ( 09 Feb 2018 23:24 )   PT: 10.9 sec;   INR: 1.01 ratio         PTT - ( 09 Feb 2018 23:24 )  PTT:29.6 sec    Arterial Blood Gas:  02-08 @ 18:27  7.42/46/135/30/100/5.0  ABG lactate: --  Arterial Blood Gas:  02-08 @ 13:14  7.45/42/101/29/99/4.9  ABG lactate: --        MICROBIOLOGY:     RADIOLOGY:  [ ] Reviewed and interpreted by me    EKG: Interval Events: Patient had NG tube placed and feeds restarted. Patient had chest tube placed on water seal and Chest XRAY showed no pneumothorax. Still not making urine.     REVIEW OF SYSTEMS:  Constitutional: [ ] negative [ ] fevers [ ] chills [ ] weight loss [ ] weight gain  HEENT: [x ] negative [ ] dry eyes [ ] eye irritation [ ] postnasal drip [ ] nasal congestion  CV: [ x] negative  [ ] chest pain [ ] orthopnea [ ] palpitations [ ] murmur  Resp: [ ] negative [ ] cough [ ] shortness of breath [ ] dyspnea [ ] wheezing [ ] sputum [ ] hemoptysis  GI: [ ] negative [ ] nausea [ ] vomiting [ ] diarrhea [ ] constipation [ ] abd pain [ ] dysphagia   : [ ] negative [ ] dysuria [ ] nocturia [ ] hematuria [ ] increased urinary frequency  Musculoskeletal: [x ] negative [ ] back pain [ ] myalgias [ ] arthralgias [ ] fracture  Skin: [x ] negative [ ] rash [ ] itch  Neurological: [ ] negative [ ] headache [ ] dizziness [ ] syncope [ ] weakness [ ] numbness  Psychiatric: [ ] negative [ ] anxiety [ ] depression  Endocrine: [ ] negative [ ] diabetes [ ] thyroid problem  Hematologic/Lymphatic: [ ] negative [ ] anemia [ ] bleeding problem  Allergic/Immunologic: [ ] negative [ ] itchy eyes [ ] nasal discharge [ ] hives [ ] angioedema  [ ] All other systems negative  [ ] Unable to assess ROS because ________    OBJECTIVE:  ICU Vital Signs Last 24 Hrs  T(C): 36.5 (10 Feb 2018 08:00), Max: 36.8 (10 Feb 2018 04:00)  T(F): 97.7 (10 Feb 2018 08:00), Max: 98.2 (10 Feb 2018 04:00)  HR: 90 (10 Feb 2018 08:00) (90 - 105)  BP: 149/62 (10 Feb 2018 08:00) (129/58 - 180/74)  BP(mean): 89 (10 Feb 2018 08:00) (83 - 110)  ABP: --  ABP(mean): --  RR: 18 (10 Feb 2018 08:00) (18 - 26)  SpO2: 96% (10 Feb 2018 08:00) (95% - 100%)        02-09 @ 07:01  -  02-10 @ 07:00  --------------------------------------------------------  IN: 650 mL / OUT: 1140 mL / NET: -490 mL      CAPILLARY BLOOD GLUCOSE      POCT Blood Glucose.: 196 mg/dL (10 Feb 2018 04:51)      PHYSICAL EXAM:  General: NAD  HEENT: AT/NC  Respiratory: clear to ascultation anteriorly  Cardiovascular: (+) S1/S2, RRR  Abdomen:   Extremities:   Skin:   Neurological:  Psychiatry:    LINES:    HOSPITAL MEDICATIONS:  aspirin  chewable 81 milliGRAM(s) Oral daily  heparin  Injectable 5000 Unit(s) SubCutaneous every 8 hours      carvedilol 12.5 milliGRAM(s) Oral every 12 hours    atorvastatin 40 milliGRAM(s) Oral at bedtime  dextrose 50% Injectable 12.5 Gram(s) IV Push once  dextrose 50% Injectable 25 Gram(s) IV Push once  dextrose 50% Injectable 25 Gram(s) IV Push once  dextrose Gel 1 Dose(s) Oral once PRN  glucagon  Injectable 1 milliGRAM(s) IntraMuscular once PRN  insulin lispro (HumaLOG) corrective regimen sliding scale   SubCutaneous every 6 hours  insulin NPH human recombinant 28 Unit(s) SubCutaneous every 6 hours    ALBUTerol/ipratropium for Nebulization 3 milliLiter(s) Nebulizer every 6 hours            dextrose 5%. 1000 milliLiter(s) IV Continuous <Continuous>      chlorhexidine 0.12% Liquid 15 milliLiter(s) Swish and Spit every 4 hours PRN        LABS:                        8.2    9.2   )-----------( 297      ( 09 Feb 2018 23:24 )             24.0     Hgb Trend: 8.2<--, 8.6<--, 8.7<--, 8.5<--, 8.9<--  02-09    136  |  98  |  28<H>  ----------------------------<  203<H>  4.8   |  28  |  2.51<H>    Ca    8.4      09 Feb 2018 23:24  Phos  5.7     02-09  Mg     2.2     02-09    TPro  5.9<L>  /  Alb  2.3<L>  /  TBili  1.0  /  DBili  x   /  AST  24  /  ALT  93<H>  /  AlkPhos  130<H>  02-09    Creatinine Trend: 2.51<--, 3.59<--, 2.02<--, 3.78<--, 2.61<--, 3.73<--  PT/INR - ( 09 Feb 2018 23:24 )   PT: 10.9 sec;   INR: 1.01 ratio         PTT - ( 09 Feb 2018 23:24 )  PTT:29.6 sec    Arterial Blood Gas:  02-08 @ 18:27  7.42/46/135/30/100/5.0  ABG lactate: --  Arterial Blood Gas:  02-08 @ 13:14  7.45/42/101/29/99/4.9  ABG lactate: --        MICROBIOLOGY:     RADIOLOGY:  [ ] Reviewed and interpreted by me    EKG: Interval Events: Patient had NG tube placed and feeds restarted. Patient had chest tube placed on water seal and Chest XRAY showed no pneumothorax. Still not making urine.     REVIEW OF SYSTEMS:  Constitutional: [ ] negative [ ] fevers [ ] chills [ ] weight loss [ ] weight gain  HEENT: [x ] negative [ ] dry eyes [ ] eye irritation [ ] postnasal drip [ ] nasal congestion  CV: [ x] negative  [ ] chest pain [ ] orthopnea [ ] palpitations [ ] murmur  Resp: [ ] negative [ ] cough [ ] shortness of breath [ ] dyspnea [ ] wheezing [ ] sputum [ ] hemoptysis  GI: [ ] negative [ ] nausea [ ] vomiting [ ] diarrhea [ ] constipation [ ] abd pain [ ] dysphagia   : [ ] negative [ ] dysuria [ ] nocturia [ ] hematuria [ ] increased urinary frequency  Musculoskeletal: [x ] negative [ ] back pain [ ] myalgias [ ] arthralgias [ ] fracture  Skin: [x ] negative [ ] rash [ ] itch  Neurological: [ ] negative [ ] headache [ ] dizziness [ ] syncope [ ] weakness [ ] numbness  Psychiatric: [ ] negative [ ] anxiety [ ] depression  Endocrine: [ ] negative [ ] diabetes [ ] thyroid problem  Hematologic/Lymphatic: [ ] negative [ ] anemia [ ] bleeding problem  Allergic/Immunologic: [ ] negative [ ] itchy eyes [ ] nasal discharge [ ] hives [ ] angioedema  [ ] All other systems negative  [ ] Unable to assess ROS because ________    OBJECTIVE:  ICU Vital Signs Last 24 Hrs  T(C): 36.5 (10 Feb 2018 08:00), Max: 36.8 (10 Feb 2018 04:00)  T(F): 97.7 (10 Feb 2018 08:00), Max: 98.2 (10 Feb 2018 04:00)  HR: 90 (10 Feb 2018 08:00) (90 - 105)  BP: 149/62 (10 Feb 2018 08:00) (129/58 - 180/74)  BP(mean): 89 (10 Feb 2018 08:00) (83 - 110)  ABP: --  ABP(mean): --  RR: 18 (10 Feb 2018 08:00) (18 - 26)  SpO2: 96% (10 Feb 2018 08:00) (95% - 100%)        02-09 @ 07:01  -  02-10 @ 07:00  --------------------------------------------------------  IN: 650 mL / OUT: 1140 mL / NET: -490 mL      CAPILLARY BLOOD GLUCOSE      POCT Blood Glucose.: 196 mg/dL (10 Feb 2018 04:51)      PHYSICAL EXAM:  General: NAD  HEENT: AT/NC  Respiratory: clear to ascultation anteriorly, L chest tube present  Cardiovascular: (+) S1/S2, RRR  Abdomen: soft- non-tender  Extremities: no edema, clubbing, cyanosis  Skin: erythematous rash on chest in distribution where ECG leads are present   Neurological: obeys commands, spontaneously moves all extremities  Psychiatry: alert and orientedx3    LINES:    HOSPITAL MEDICATIONS:  aspirin  chewable 81 milliGRAM(s) Oral daily  heparin  Injectable 5000 Unit(s) SubCutaneous every 8 hours      carvedilol 12.5 milliGRAM(s) Oral every 12 hours    atorvastatin 40 milliGRAM(s) Oral at bedtime  dextrose 50% Injectable 12.5 Gram(s) IV Push once  dextrose 50% Injectable 25 Gram(s) IV Push once  dextrose 50% Injectable 25 Gram(s) IV Push once  dextrose Gel 1 Dose(s) Oral once PRN  glucagon  Injectable 1 milliGRAM(s) IntraMuscular once PRN  insulin lispro (HumaLOG) corrective regimen sliding scale   SubCutaneous every 6 hours  insulin NPH human recombinant 28 Unit(s) SubCutaneous every 6 hours    ALBUTerol/ipratropium for Nebulization 3 milliLiter(s) Nebulizer every 6 hours            dextrose 5%. 1000 milliLiter(s) IV Continuous <Continuous>      chlorhexidine 0.12% Liquid 15 milliLiter(s) Swish and Spit every 4 hours PRN        LABS:                        8.2    9.2   )-----------( 297      ( 09 Feb 2018 23:24 )             24.0     Hgb Trend: 8.2<--, 8.6<--, 8.7<--, 8.5<--, 8.9<--  02-09    136  |  98  |  28<H>  ----------------------------<  203<H>  4.8   |  28  |  2.51<H>    Ca    8.4      09 Feb 2018 23:24  Phos  5.7     02-09  Mg     2.2     02-09    TPro  5.9<L>  /  Alb  2.3<L>  /  TBili  1.0  /  DBili  x   /  AST  24  /  ALT  93<H>  /  AlkPhos  130<H>  02-09    Creatinine Trend: 2.51<--, 3.59<--, 2.02<--, 3.78<--, 2.61<--, 3.73<--  PT/INR - ( 09 Feb 2018 23:24 )   PT: 10.9 sec;   INR: 1.01 ratio         PTT - ( 09 Feb 2018 23:24 )  PTT:29.6 sec    Arterial Blood Gas:  02-08 @ 18:27  7.42/46/135/30/100/5.0  ABG lactate: --  Arterial Blood Gas:  02-08 @ 13:14  7.45/42/101/29/99/4.9  ABG lactate: --

## 2018-02-10 NOTE — PROGRESS NOTE ADULT - ATTENDING COMMENTS
Patient is HD stable off pressors, having difficulty expectorating secretions and is requiring suctioning and Chest PT.  SEcondary to this and overall deconditioned state she will remain the in the mICU.  CT was clamped today.  Will obtain CXR and reevaluate with ultrasound.  This morning she has focal areas where there is no lung sliding in the left lung.  She may have received RT to left chest wall.  Agree with current management.  Continue MICU observation.

## 2018-02-10 NOTE — CHART NOTE - NSCHARTNOTEFT_GEN_A_CORE
: Dr. Ventura    INDICATION: Evaluation for pneumothorax    PROCEDURE:  [ ] LIMITED ECHO  [ x] LIMITED CHEST  [ ] LIMITED RETROPERITONEAL  [ ] LIMITED ABDOMINAL  [ ] LIMITED DVT  [ ] NEEDLE GUIDANCE VASCULAR  [ ] NEEDLE GUIDANCE THORACENTESIS  [ ] NEEDLE GUIDANCE PARACENTESIS  [ ] NEEDLE GUIDANCE PERICARDIOCENTESIS  [ ] OTHER    FINDINGS: No lung sliding or lung pulsation anteriorly  on the left chest. Lung sliding noted in middle of anterior chest at the midclavicular line. Lung sliding in the midaxillary line at most superior point.       INTERPRETATION: No lung sliding may indicate pneumothorax but given a normal CXR, this may be due to previous radiation to the chest. No obvious lung point noted so no pneumothorax identified while patient is on water seal. : Dr. Ventura    INDICATION: Evaluation for pneumothorax    PROCEDURE:  [ ] LIMITED ECHO  [ x] LIMITED CHEST  [ ] LIMITED RETROPERITONEAL  [ ] LIMITED ABDOMINAL  [ ] LIMITED DVT  [ ] NEEDLE GUIDANCE VASCULAR  [ ] NEEDLE GUIDANCE THORACENTESIS  [ ] NEEDLE GUIDANCE PARACENTESIS  [ ] NEEDLE GUIDANCE PERICARDIOCENTESIS  [ ] OTHER    FINDINGS: No lung sliding or lung pulsation anteriorly  on the left chest. Lung sliding noted in middle of anterior chest at the midclavicular line. Lung sliding in the midaxillary line at most superior point.       INTERPRETATION: No lung sliding may indicate pneumothorax but given a normal CXR, this may be due to previous radiation to the chest. No obvious lung point noted so no pneumothorax identified while patient is on water seal.      MICU Attending     I was present throughout the entire procedure.

## 2018-02-11 LAB
ALBUMIN SERPL ELPH-MCNC: 2.3 G/DL — LOW (ref 3.3–5)
ALBUMIN SERPL ELPH-MCNC: 2.3 G/DL — LOW (ref 3.3–5)
ALP SERPL-CCNC: 197 U/L — HIGH (ref 40–120)
ALP SERPL-CCNC: 254 U/L — HIGH (ref 40–120)
ALT FLD-CCNC: 71 U/L RC — HIGH (ref 10–45)
ALT FLD-CCNC: 83 U/L RC — HIGH (ref 10–45)
ANION GAP SERPL CALC-SCNC: 13 MMOL/L — SIGNIFICANT CHANGE UP (ref 5–17)
ANION GAP SERPL CALC-SCNC: 14 MMOL/L — SIGNIFICANT CHANGE UP (ref 5–17)
APTT BLD: 27.4 SEC — LOW (ref 27.5–37.4)
APTT BLD: 27.7 SEC — SIGNIFICANT CHANGE UP (ref 27.5–37.4)
AST SERPL-CCNC: 37 U/L — SIGNIFICANT CHANGE UP (ref 10–40)
AST SERPL-CCNC: 40 U/L — SIGNIFICANT CHANGE UP (ref 10–40)
BILIRUB SERPL-MCNC: 0.7 MG/DL — SIGNIFICANT CHANGE UP (ref 0.2–1.2)
BILIRUB SERPL-MCNC: 0.7 MG/DL — SIGNIFICANT CHANGE UP (ref 0.2–1.2)
BUN SERPL-MCNC: 42 MG/DL — HIGH (ref 7–23)
BUN SERPL-MCNC: 45 MG/DL — HIGH (ref 7–23)
CALCIUM SERPL-MCNC: 8.2 MG/DL — LOW (ref 8.4–10.5)
CALCIUM SERPL-MCNC: 8.4 MG/DL — SIGNIFICANT CHANGE UP (ref 8.4–10.5)
CHLORIDE SERPL-SCNC: 94 MMOL/L — LOW (ref 96–108)
CHLORIDE SERPL-SCNC: 97 MMOL/L — SIGNIFICANT CHANGE UP (ref 96–108)
CO2 SERPL-SCNC: 26 MMOL/L — SIGNIFICANT CHANGE UP (ref 22–31)
CO2 SERPL-SCNC: 27 MMOL/L — SIGNIFICANT CHANGE UP (ref 22–31)
CREAT SERPL-MCNC: 3.13 MG/DL — HIGH (ref 0.5–1.3)
CREAT SERPL-MCNC: 3.81 MG/DL — HIGH (ref 0.5–1.3)
GAS PNL BLDA: SIGNIFICANT CHANGE UP
GLUCOSE BLDC GLUCOMTR-MCNC: 185 MG/DL — HIGH (ref 70–99)
GLUCOSE BLDC GLUCOMTR-MCNC: 279 MG/DL — HIGH (ref 70–99)
GLUCOSE BLDC GLUCOMTR-MCNC: 99 MG/DL — SIGNIFICANT CHANGE UP (ref 70–99)
GLUCOSE SERPL-MCNC: 302 MG/DL — HIGH (ref 70–99)
GLUCOSE SERPL-MCNC: 338 MG/DL — HIGH (ref 70–99)
HCT VFR BLD CALC: 24.4 % — LOW (ref 34.5–45)
HGB BLD-MCNC: 7.8 G/DL — LOW (ref 11.5–15.5)
INR BLD: 0.9 RATIO — SIGNIFICANT CHANGE UP (ref 0.88–1.16)
INR BLD: 0.91 RATIO — SIGNIFICANT CHANGE UP (ref 0.88–1.16)
MAGNESIUM SERPL-MCNC: 2.2 MG/DL — SIGNIFICANT CHANGE UP (ref 1.6–2.6)
MAGNESIUM SERPL-MCNC: 2.5 MG/DL — SIGNIFICANT CHANGE UP (ref 1.6–2.6)
MCHC RBC-ENTMCNC: 29.6 PG — SIGNIFICANT CHANGE UP (ref 27–34)
MCHC RBC-ENTMCNC: 31.9 GM/DL — LOW (ref 32–36)
MCV RBC AUTO: 92.8 FL — SIGNIFICANT CHANGE UP (ref 80–100)
PHOSPHATE SERPL-MCNC: 5.8 MG/DL — HIGH (ref 2.5–4.5)
PHOSPHATE SERPL-MCNC: 7.2 MG/DL — HIGH (ref 2.5–4.5)
PLATELET # BLD AUTO: 279 K/UL — SIGNIFICANT CHANGE UP (ref 150–400)
POTASSIUM SERPL-MCNC: 4.6 MMOL/L — SIGNIFICANT CHANGE UP (ref 3.5–5.3)
POTASSIUM SERPL-MCNC: 5.1 MMOL/L — SIGNIFICANT CHANGE UP (ref 3.5–5.3)
POTASSIUM SERPL-SCNC: 4.6 MMOL/L — SIGNIFICANT CHANGE UP (ref 3.5–5.3)
POTASSIUM SERPL-SCNC: 5.1 MMOL/L — SIGNIFICANT CHANGE UP (ref 3.5–5.3)
PROT SERPL-MCNC: 6.2 G/DL — SIGNIFICANT CHANGE UP (ref 6–8.3)
PROT SERPL-MCNC: 6.2 G/DL — SIGNIFICANT CHANGE UP (ref 6–8.3)
PROTHROM AB SERPL-ACNC: 9.7 SEC — LOW (ref 9.8–12.7)
PROTHROM AB SERPL-ACNC: 9.9 SEC — SIGNIFICANT CHANGE UP (ref 9.8–12.7)
RBC # BLD: 2.63 M/UL — LOW (ref 3.8–5.2)
RBC # FLD: 15.1 % — HIGH (ref 10.3–14.5)
SODIUM SERPL-SCNC: 134 MMOL/L — LOW (ref 135–145)
SODIUM SERPL-SCNC: 137 MMOL/L — SIGNIFICANT CHANGE UP (ref 135–145)
WBC # BLD: 5.8 K/UL — SIGNIFICANT CHANGE UP (ref 3.8–10.5)
WBC # FLD AUTO: 5.8 K/UL — SIGNIFICANT CHANGE UP (ref 3.8–10.5)

## 2018-02-11 PROCEDURE — 71045 X-RAY EXAM CHEST 1 VIEW: CPT | Mod: 26

## 2018-02-11 PROCEDURE — 99233 SBSQ HOSP IP/OBS HIGH 50: CPT | Mod: GC

## 2018-02-11 RX ORDER — IPRATROPIUM/ALBUTEROL SULFATE 18-103MCG
3 AEROSOL WITH ADAPTER (GRAM) INHALATION ONCE
Qty: 0 | Refills: 0 | Status: COMPLETED | OUTPATIENT
Start: 2018-02-11 | End: 2018-02-11

## 2018-02-11 RX ORDER — HYDRALAZINE HCL 50 MG
25 TABLET ORAL EVERY 8 HOURS
Qty: 0 | Refills: 0 | Status: DISCONTINUED | OUTPATIENT
Start: 2018-02-11 | End: 2018-02-15

## 2018-02-11 RX ORDER — HYDRALAZINE HCL 50 MG
25 TABLET ORAL EVERY 8 HOURS
Qty: 0 | Refills: 0 | Status: DISCONTINUED | OUTPATIENT
Start: 2018-02-11 | End: 2018-02-11

## 2018-02-11 RX ORDER — NYSTATIN CREAM 100000 [USP'U]/G
1 CREAM TOPICAL
Qty: 0 | Refills: 0 | Status: DISCONTINUED | OUTPATIENT
Start: 2018-02-11 | End: 2018-02-21

## 2018-02-11 RX ORDER — LABETALOL HCL 100 MG
10 TABLET ORAL ONCE
Qty: 0 | Refills: 0 | Status: DISCONTINUED | OUTPATIENT
Start: 2018-02-11 | End: 2018-02-11

## 2018-02-11 RX ORDER — HUMAN INSULIN 100 [IU]/ML
30 INJECTION, SUSPENSION SUBCUTANEOUS
Qty: 0 | Refills: 0 | Status: DISCONTINUED | OUTPATIENT
Start: 2018-02-11 | End: 2018-02-13

## 2018-02-11 RX ORDER — LABETALOL HCL 100 MG
10 TABLET ORAL ONCE
Qty: 0 | Refills: 0 | Status: COMPLETED | OUTPATIENT
Start: 2018-02-11 | End: 2018-02-11

## 2018-02-11 RX ORDER — HYDRALAZINE HCL 50 MG
10 TABLET ORAL ONCE
Qty: 0 | Refills: 0 | Status: COMPLETED | OUTPATIENT
Start: 2018-02-11 | End: 2018-02-11

## 2018-02-11 RX ORDER — LABETALOL HCL 100 MG
5 TABLET ORAL ONCE
Qty: 0 | Refills: 0 | Status: COMPLETED | OUTPATIENT
Start: 2018-02-11 | End: 2018-02-11

## 2018-02-11 RX ORDER — HYDRALAZINE HCL 50 MG
5 TABLET ORAL ONCE
Qty: 0 | Refills: 0 | Status: COMPLETED | OUTPATIENT
Start: 2018-02-11 | End: 2018-02-11

## 2018-02-11 RX ADMIN — Medication 3 MILLILITER(S): at 11:26

## 2018-02-11 RX ADMIN — Medication 25 MILLIGRAM(S): at 20:15

## 2018-02-11 RX ADMIN — HEPARIN SODIUM 5000 UNIT(S): 5000 INJECTION INTRAVENOUS; SUBCUTANEOUS at 15:12

## 2018-02-11 RX ADMIN — Medication 81 MILLIGRAM(S): at 12:05

## 2018-02-11 RX ADMIN — Medication 5 MILLIGRAM(S): at 21:43

## 2018-02-11 RX ADMIN — HUMAN INSULIN 28 UNIT(S): 100 INJECTION, SUSPENSION SUBCUTANEOUS at 05:09

## 2018-02-11 RX ADMIN — Medication 3 MILLILITER(S): at 17:32

## 2018-02-11 RX ADMIN — Medication 1 APPLICATION(S): at 12:26

## 2018-02-11 RX ADMIN — CARVEDILOL PHOSPHATE 12.5 MILLIGRAM(S): 80 CAPSULE, EXTENDED RELEASE ORAL at 05:05

## 2018-02-11 RX ADMIN — Medication 5 MILLIGRAM(S): at 05:20

## 2018-02-11 RX ADMIN — HUMAN INSULIN 28 UNIT(S): 100 INJECTION, SUSPENSION SUBCUTANEOUS at 12:26

## 2018-02-11 RX ADMIN — HEPARIN SODIUM 5000 UNIT(S): 5000 INJECTION INTRAVENOUS; SUBCUTANEOUS at 09:18

## 2018-02-11 RX ADMIN — Medication 10 MILLIGRAM(S): at 05:27

## 2018-02-11 RX ADMIN — Medication 3 MILLILITER(S): at 23:40

## 2018-02-11 RX ADMIN — Medication 3 MILLILITER(S): at 05:04

## 2018-02-11 RX ADMIN — Medication 3 MILLILITER(S): at 05:00

## 2018-02-11 RX ADMIN — Medication 3 MILLILITER(S): at 00:17

## 2018-02-11 RX ADMIN — ATORVASTATIN CALCIUM 40 MILLIGRAM(S): 80 TABLET, FILM COATED ORAL at 21:43

## 2018-02-11 RX ADMIN — Medication 2: at 19:06

## 2018-02-11 RX ADMIN — Medication 6: at 05:09

## 2018-02-11 RX ADMIN — Medication 10 MILLIGRAM(S): at 11:48

## 2018-02-11 RX ADMIN — NYSTATIN CREAM 1 APPLICATION(S): 100000 CREAM TOPICAL at 13:24

## 2018-02-11 RX ADMIN — HUMAN INSULIN 30 UNIT(S): 100 INJECTION, SUSPENSION SUBCUTANEOUS at 19:05

## 2018-02-11 NOTE — PROGRESS NOTE ADULT - ATTENDING COMMENTS
Mrs. Barraza is more lethargic appearing today with slight rise in pCO2, hypertension and wheezing on lung exam today consistent with volume overload.  Chest tube unclamped as there is possible apical pneumothorax noted.  Will request dialysis for patient today, continue MICU observation.

## 2018-02-11 NOTE — PROGRESS NOTE ADULT - SUBJECTIVE AND OBJECTIVE BOX
Chief Complaint: f/u DM2     History:  Patient seen and examined at bedside. Appears comfortable but lethargic. Tube feeds restarted at 12 pm yesterday. Patient currently ordered for NPH 28 units q6 hours. NPH given this AM but tube feeds were held for approximately 6 hours thereafter. Patient currently on tube feeds at goal rate of 80 cc/hr, however tube feeds generally on for only 18 hours a day.     MEDICATIONS  (STANDING):  ALBUTerol/ipratropium for Nebulization 3 milliLiter(s) Nebulizer every 6 hours  aspirin  chewable 81 milliGRAM(s) Oral daily  atorvastatin 40 milliGRAM(s) Oral at bedtime  carvedilol 12.5 milliGRAM(s) Oral every 12 hours  dextrose 5%. 1000 milliLiter(s) (50 mL/Hr) IV Continuous <Continuous>  dextrose 50% Injectable 12.5 Gram(s) IV Push once  dextrose 50% Injectable 25 Gram(s) IV Push once  dextrose 50% Injectable 25 Gram(s) IV Push once  heparin  Injectable 5000 Unit(s) SubCutaneous every 8 hours  hydrALAZINE 25 milliGRAM(s) Oral every 8 hours  hydrocortisone 1% Cream 1 Application(s) Topical daily  insulin lispro (HumaLOG) corrective regimen sliding scale   SubCutaneous every 6 hours  insulin NPH human recombinant 28 Unit(s) SubCutaneous every 6 hours  nystatin Powder 1 Application(s) Topical two times a day    MEDICATIONS  (PRN):  chlorhexidine 0.12% Liquid 15 milliLiter(s) Swish and Spit every 4 hours PRN mouth hygiene  dextrose Gel 1 Dose(s) Oral once PRN Blood Glucose LESS THAN 70 milliGRAM(s)/deciliter  glucagon  Injectable 1 milliGRAM(s) IntraMuscular once PRN Glucose LESS THAN 70 milligrams/deciliter      Allergies    NIFEdipine (Urticaria; Hives)  vitamin E (Short breath; Urticaria; Hives)      Review of Systems:  UNABLE TO OBTAIN, patient lethargic at bedside.    PHYSICAL EXAM:  VITALS: T(C): 36.3 (02-11-18 @ 08:00)  T(F): 97.3 (02-11-18 @ 08:00), Max: 98 (02-11-18 @ 04:00)  HR: 92 (02-11-18 @ 12:00) (81 - 99)  BP: 140/61 (02-11-18 @ 12:00) (123/57 - 200/76)  RR:  (16 - 40)  SpO2:  (93% - 100%)  Wt(kg): --  GENERAL: NAD, well-developed  HEENT:  Atraumatic, Normocephalic  RESPIRATORY: Clear to auscultation bilaterally; No rales, rhonchi, wheezing, or rubs  CARDIOVASCULAR: Regular rate and rhythm; No murmurs  GI: Soft, nontender, non distended, normal bowel sounds  SKIN: Dry, intact      POCT Blood Glucose.: 99 mg/dL (02-11-18 @ 12:21) NPH 28  POCT Blood Glucose.: 279 mg/dL (02-11-18 @ 05:08) NPH 28, H 6  POCT Blood Glucose.: 314 mg/dL (02-10-18 @ 23:01) NPH 28, 8  POCT Blood Glucose.: 162 mg/dL (02-10-18 @ 17:02) NPH 28, 2  POCT Blood Glucose.: 92 mg/dL (02-10-18 @ 12:26)   POCT Blood Glucose.: 196 mg/dL (02-10-18 @ 04:51)  POCT Blood Glucose.: 179 mg/dL (02-09-18 @ 22:22)  POCT Blood Glucose.: 94 mg/dL (02-09-18 @ 17:30)  POCT Blood Glucose.: 204 mg/dL (02-09-18 @ 11:40)  POCT Blood Glucose.: 146 mg/dL (02-09-18 @ 05:30)  POCT Blood Glucose.: 170 mg/dL (02-09-18 @ 05:29)  POCT Blood Glucose.: 116 mg/dL (02-09-18 @ 02:53)  POCT Blood Glucose.: 114 mg/dL (02-08-18 @ 23:20)  POCT Blood Glucose.: 103 mg/dL (02-08-18 @ 22:04)  POCT Blood Glucose.: 114 mg/dL (02-08-18 @ 21:13)  POCT Blood Glucose.: 118 mg/dL (02-08-18 @ 19:42)  POCT Blood Glucose.: 130 mg/dL (02-08-18 @ 18:14)  POCT Blood Glucose.: 70 mg/dL (02-08-18 @ 17:48)  POCT Blood Glucose.: 92 mg/dL (02-08-18 @ 16:32)  POCT Blood Glucose.: 100 mg/dL (02-08-18 @ 15:29)  POCT Blood Glucose.: 113 mg/dL (02-08-18 @ 14:32)      02-10    137  |  97  |  45<H>  ----------------------------<  302<H>  5.1   |  27  |  3.81<H>    EGFR if : 13<L>  EGFR if non : 11<L>    Ca    8.4      02-10  Mg     2.5     02-10  Phos  7.2     02-10    TPro  6.2  /  Alb  2.3<L>  /  TBili  0.7  /  DBili  x   /  AST  37  /  ALT  83<H>  /  AlkPhos  197<H>  02-10          Thyroid Function Tests:      Hemoglobin A1C, Whole Blood: 8.6 % <H> [4.0 - 5.6] (01-31-18 @ 07:15)  Hemoglobin A1C, Whole Blood: 8.7 % <H> [4.0 - 5.6] (01-31-18 @ 05:51)

## 2018-02-11 NOTE — PROGRESS NOTE ADULT - PROBLEM SELECTOR PLAN 1
likely from ATN in setting of cardiac arrest. Pt continues to be oligo anuric  Last HD on 02/09.  Pt with volume overload today with worsening SOB   Will plan on UF with 2 hour HD treatment today and again usual HD tomorrow with UF

## 2018-02-11 NOTE — PROGRESS NOTE ADULT - SUBJECTIVE AND OBJECTIVE BOX
[facility] DIVISION OF KIDNEY DISEASES AND HYPERTENSION --    24 hour events/subjective:    pt with sob and had BIPAP overnight    PAST HISTORY  --------------------------------------------------------------------------------  No significant changes to PMH, PSH, FHx, SHx, unless otherwise noted    ALLERGIES & MEDICATIONS  --------------------------------------------------------------------------------  Allergies    NIFEdipine (Urticaria; Hives)  vitamin E (Short breath; Urticaria; Hives)    Intolerances      Standing Inpatient Medications  ALBUTerol/ipratropium for Nebulization 3 milliLiter(s) Nebulizer every 6 hours  aspirin  chewable 81 milliGRAM(s) Oral daily  atorvastatin 40 milliGRAM(s) Oral at bedtime  carvedilol 12.5 milliGRAM(s) Oral every 12 hours  dextrose 5%. 1000 milliLiter(s) IV Continuous <Continuous>  dextrose 50% Injectable 12.5 Gram(s) IV Push once  dextrose 50% Injectable 25 Gram(s) IV Push once  dextrose 50% Injectable 25 Gram(s) IV Push once  heparin  Injectable 5000 Unit(s) SubCutaneous every 8 hours  hydrALAZINE 25 milliGRAM(s) Oral every 8 hours  hydrocortisone 1% Cream 1 Application(s) Topical daily  insulin lispro (HumaLOG) corrective regimen sliding scale   SubCutaneous every 6 hours  insulin NPH human recombinant 28 Unit(s) SubCutaneous every 6 hours  nystatin Powder 1 Application(s) Topical two times a day    PRN Inpatient Medications  chlorhexidine 0.12% Liquid 15 milliLiter(s) Swish and Spit every 4 hours PRN  dextrose Gel 1 Dose(s) Oral once PRN  glucagon  Injectable 1 milliGRAM(s) IntraMuscular once PRN      REVIEW OF SYSTEMS  --------------------------------------------------------------------------------    All other systems were reviewed and are negative, except as noted.    VITALS/PHYSICAL EXAM  --------------------------------------------------------------------------------  T(C): 36.3 (02-11-18 @ 08:00), Max: 36.7 (02-11-18 @ 04:00)  HR: 92 (02-11-18 @ 12:00) (81 - 99)  BP: 140/61 (02-11-18 @ 12:00) (123/57 - 200/76)  RR: 40 (02-11-18 @ 12:00) (16 - 40)  SpO2: 98% (02-11-18 @ 12:00) (93% - 100%)  Wt(kg): --        02-10-18 @ 07:01  -  02-11-18 @ 07:00  --------------------------------------------------------  IN: 1340 mL / OUT: 60 mL / NET: 1280 mL      Physical Exam:  	Gen: NAD,   	HEENT:   	Pulm: wheezing and rhonchi bilaterally  	CV: RRR, S1S2;   	Abd: +BS, soft, nontender/nondistended  	: No suprapubic tenderness  	LE: Warm, edema  	Skin: Warm, without rashes  	Vascular access:    LABS/STUDIES  --------------------------------------------------------------------------------              8.0    6.5   >-----------<  302      [02-10-18 @ 23:33]              24.4     137  |  97  |  45  ----------------------------<  302      [02-10-18 @ 23:33]  5.1   |  27  |  3.81        Ca     8.4     [02-10-18 @ 23:33]      Mg     2.5     [02-10-18 @ 23:33]      Phos  7.2     [02-10-18 @ 23:33]    TPro  6.2  /  Alb  2.3  /  TBili  0.7  /  DBili  x   /  AST  37  /  ALT  83  /  AlkPhos  197  [02-10-18 @ 23:33]    PT/INR: PT 9.9  , INR 0.91       [02-10-18 @ 23:33]  PTT: 27.7       [02-10-18 @ 23:33]      Creatinine Trend:  SCr 3.81 [02-10 @ 23:33]  SCr 2.51 [02-09 @ 23:24]  SCr 3.59 [02-09 @ 00:00]  SCr 2.02 [02-08 @ 01:27]  SCr 3.78 [02-06 @ 23:53]    Urinalysis - [01-30-18 @ 12:52]      Color Yellow / Appearance SL Turbid / SG 1.025 / pH 6.0      Gluc 50 / Ketone Negative  / Bili Negative / Urobili Negative       Blood Trace / Protein 150 / Leuk Est Negative / Nitrite Negative      RBC 3-5 / WBC  / Hyaline  / Gran  / Sq Epi  / Non Sq Epi OCC / Bacteria       HbA1c 8.6      [01-31-18 @ 07:15]  Lipid: chol 183, , HDL 10,       [02-07-18 @ 13:34]    HBsAg Nonreact      [02-01-18 @ 22:49]  HCV 0.16, Nonreact      [02-01-18 @ 22:49]

## 2018-02-11 NOTE — PROGRESS NOTE ADULT - SUBJECTIVE AND OBJECTIVE BOX
Interval Events: Chest tube was clamped ON. Patient developed SOB ON w/ wheezing which responded to duonebs. Prelim of chest XRAY showed small pneumothorax. Chest tube back on suction.     REVIEW OF SYSTEMS:  Constitutional: [ ] negative [ ] fevers [ ] chills [ ] weight loss [ ] weight gain  HEENT: [ ] negative [ ] dry eyes [ ] eye irritation [ ] postnasal drip [ ] nasal congestion  CV: [ ] negative  [ ] chest pain [ ] orthopnea [ ] palpitations [ ] murmur  Resp: [ ] negative [ ] cough [ ] shortness of breath [ ] dyspnea [ ] wheezing [ ] sputum [ ] hemoptysis  GI: [ ] negative [ ] nausea [ ] vomiting [ ] diarrhea [ ] constipation [ ] abd pain [ ] dysphagia   : [ ] negative [ ] dysuria [ ] nocturia [ ] hematuria [ ] increased urinary frequency  Musculoskeletal: [ ] negative [ ] back pain [ ] myalgias [ ] arthralgias [ ] fracture  Skin: [ ] negative [ ] rash [ ] itch  Neurological: [ ] negative [ ] headache [ ] dizziness [ ] syncope [ ] weakness [ ] numbness  Psychiatric: [ ] negative [ ] anxiety [ ] depression  Endocrine: [ ] negative [ ] diabetes [ ] thyroid problem  Hematologic/Lymphatic: [ ] negative [ ] anemia [ ] bleeding problem  Allergic/Immunologic: [ ] negative [ ] itchy eyes [ ] nasal discharge [ ] hives [ ] angioedema  [ ] All other systems negative  [x ] Unable to assess ROS because patient was tired this AM    OBJECTIVE:  ICU Vital Signs Last 24 Hrs  T(C): 36.3 (11 Feb 2018 08:00), Max: 36.7 (11 Feb 2018 04:00)  T(F): 97.3 (11 Feb 2018 08:00), Max: 98 (11 Feb 2018 04:00)  HR: 99 (11 Feb 2018 08:15) (82 - 99)  BP: 153/65 (11 Feb 2018 08:00) (123/57 - 200/76)  BP(mean): 93 (11 Feb 2018 08:00) (75 - 114)  ABP: --  ABP(mean): --  RR: 24 (11 Feb 2018 08:00) (16 - 32)  SpO2: 98% (11 Feb 2018 08:15) (93% - 100%)        02-10 @ 07:01  -  02-11 @ 07:00  --------------------------------------------------------  IN: 1340 mL / OUT: 60 mL / NET: 1280 mL      CAPILLARY BLOOD GLUCOSE      POCT Blood Glucose.: 279 mg/dL (11 Feb 2018 05:08)      PHYSICAL EXAM:  General: NAD, appears tired, but is reponsive  HEENT: AT/NC   Respiratory: L chest tube present- rhonci present anteriorly   Cardiovascular: RRR, (+) S1/S2  Abdomen: soft- non-tender  Extremities- no clubbing, cyanosis  Skin: erythematous rash on chest in distribution where ECG leads are present   Neurological: obeys commands, spontaneously moves all extremities  Psychiatry: alert and orientedx2    LINES:    HOSPITAL MEDICATIONS:  aspirin  chewable 81 milliGRAM(s) Oral daily  heparin  Injectable 5000 Unit(s) SubCutaneous every 8 hours      carvedilol 12.5 milliGRAM(s) Oral every 12 hours    atorvastatin 40 milliGRAM(s) Oral at bedtime  dextrose 50% Injectable 12.5 Gram(s) IV Push once  dextrose 50% Injectable 25 Gram(s) IV Push once  dextrose 50% Injectable 25 Gram(s) IV Push once  dextrose Gel 1 Dose(s) Oral once PRN  glucagon  Injectable 1 milliGRAM(s) IntraMuscular once PRN  insulin lispro (HumaLOG) corrective regimen sliding scale   SubCutaneous every 6 hours  insulin NPH human recombinant 28 Unit(s) SubCutaneous every 6 hours    ALBUTerol/ipratropium for Nebulization 3 milliLiter(s) Nebulizer every 6 hours            dextrose 5%. 1000 milliLiter(s) IV Continuous <Continuous>      chlorhexidine 0.12% Liquid 15 milliLiter(s) Swish and Spit every 4 hours PRN  hydrocortisone 1% Cream 1 Application(s) Topical daily        LABS:                        8.0    6.5   )-----------( 302      ( 10 Feb 2018 23:33 )             24.4     Hgb Trend: 8.0<--, 8.2<--, 8.6<--, 8.7<--, 8.5<--  02-10    137  |  97  |  45<H>  ----------------------------<  302<H>  5.1   |  27  |  3.81<H>    Ca    8.4      10 Feb 2018 23:33  Phos  7.2     02-10  Mg     2.5     02-10    TPro  6.2  /  Alb  2.3<L>  /  TBili  0.7  /  DBili  x   /  AST  37  /  ALT  83<H>  /  AlkPhos  197<H>  02-10    Creatinine Trend: 3.81<--, 2.51<--, 3.59<--, 2.02<--, 3.78<--, 2.61<--  PT/INR - ( 10 Feb 2018 23:33 )   PT: 9.9 sec;   INR: 0.91 ratio         PTT - ( 10 Feb 2018 23:33 )  PTT:27.7 sec    Arterial Blood Gas:  02-11 @ 05:20  7.37/50/162/28/100/2.9  ABG lactate: -- Interval Events: Chest tube was clamped ON. Patient developed SOB ON w/ wheezing which responded to duonebs. Prelim of chest XRAY showed small pneumothorax. Chest tube back on suction.     REVIEW OF SYSTEMS:  Constitutional: [ ] negative [ ] fevers [ ] chills [ ] weight loss [ ] weight gain  HEENT: [ ] negative [ ] dry eyes [ ] eye irritation [ ] postnasal drip [ ] nasal congestion  CV: [ ] negative  [ ] chest pain [ ] orthopnea [ ] palpitations [ ] murmur  Resp: [ ] negative [ ] cough [ ] shortness of breath [ ] dyspnea [ ] wheezing [ ] sputum [ ] hemoptysis  GI: [ ] negative [ ] nausea [ ] vomiting [ ] diarrhea [ ] constipation [ ] abd pain [ ] dysphagia   : [ ] negative [ ] dysuria [ ] nocturia [ ] hematuria [ ] increased urinary frequency  Musculoskeletal: [ ] negative [ ] back pain [ ] myalgias [ ] arthralgias [ ] fracture  Skin: [ ] negative [ ] rash [ ] itch  Neurological: [ ] negative [ ] headache [ ] dizziness [ ] syncope [ ] weakness [ ] numbness  Psychiatric: [ ] negative [ ] anxiety [ ] depression  Endocrine: [ ] negative [ ] diabetes [ ] thyroid problem  Hematologic/Lymphatic: [ ] negative [ ] anemia [ ] bleeding problem  Allergic/Immunologic: [ ] negative [ ] itchy eyes [ ] nasal discharge [ ] hives [ ] angioedema  [ ] All other systems negative  [x ] Unable to assess ROS because patient was tired this AM    OBJECTIVE:  ICU Vital Signs Last 24 Hrs  T(C): 36.3 (11 Feb 2018 08:00), Max: 36.7 (11 Feb 2018 04:00)  T(F): 97.3 (11 Feb 2018 08:00), Max: 98 (11 Feb 2018 04:00)  HR: 99 (11 Feb 2018 08:15) (82 - 99)  BP: 153/65 (11 Feb 2018 08:00) (123/57 - 200/76)  BP(mean): 93 (11 Feb 2018 08:00) (75 - 114)  ABP: --  ABP(mean): --  RR: 24 (11 Feb 2018 08:00) (16 - 32)  SpO2: 98% (11 Feb 2018 08:15) (93% - 100%)        02-10 @ 07:01  -  02-11 @ 07:00  --------------------------------------------------------  IN: 1340 mL / OUT: 60 mL / NET: 1280 mL      CAPILLARY BLOOD GLUCOSE      POCT Blood Glucose.: 279 mg/dL (11 Feb 2018 05:08)      PHYSICAL EXAM:  General: NAD, appears tired, but is reponsive  HEENT: AT/NC   Respiratory: L chest tube present- wheezing present anteriorly  Cardiovascular: RRR, (+) S1/S2  Abdomen: soft- non-tender  Extremities- no clubbing, cyanosis, edema of BLE's  Skin: erythematous rash on chest in distribution where ECG leads are present   Neurological: obeys commands, spontaneously moves all extremities  Psychiatry: alert and orientedx2    LINES:    HOSPITAL MEDICATIONS:  aspirin  chewable 81 milliGRAM(s) Oral daily  heparin  Injectable 5000 Unit(s) SubCutaneous every 8 hours      carvedilol 12.5 milliGRAM(s) Oral every 12 hours    atorvastatin 40 milliGRAM(s) Oral at bedtime  dextrose 50% Injectable 12.5 Gram(s) IV Push once  dextrose 50% Injectable 25 Gram(s) IV Push once  dextrose 50% Injectable 25 Gram(s) IV Push once  dextrose Gel 1 Dose(s) Oral once PRN  glucagon  Injectable 1 milliGRAM(s) IntraMuscular once PRN  insulin lispro (HumaLOG) corrective regimen sliding scale   SubCutaneous every 6 hours  insulin NPH human recombinant 28 Unit(s) SubCutaneous every 6 hours    ALBUTerol/ipratropium for Nebulization 3 milliLiter(s) Nebulizer every 6 hours            dextrose 5%. 1000 milliLiter(s) IV Continuous <Continuous>      chlorhexidine 0.12% Liquid 15 milliLiter(s) Swish and Spit every 4 hours PRN  hydrocortisone 1% Cream 1 Application(s) Topical daily        LABS:                        8.0    6.5   )-----------( 302      ( 10 Feb 2018 23:33 )             24.4     Hgb Trend: 8.0<--, 8.2<--, 8.6<--, 8.7<--, 8.5<--  02-10    137  |  97  |  45<H>  ----------------------------<  302<H>  5.1   |  27  |  3.81<H>    Ca    8.4      10 Feb 2018 23:33  Phos  7.2     02-10  Mg     2.5     02-10    TPro  6.2  /  Alb  2.3<L>  /  TBili  0.7  /  DBili  x   /  AST  37  /  ALT  83<H>  /  AlkPhos  197<H>  02-10    Creatinine Trend: 3.81<--, 2.51<--, 3.59<--, 2.02<--, 3.78<--, 2.61<--  PT/INR - ( 10 Feb 2018 23:33 )   PT: 9.9 sec;   INR: 0.91 ratio         PTT - ( 10 Feb 2018 23:33 )  PTT:27.7 sec    Arterial Blood Gas:  02-11 @ 05:20  7.37/50/162/28/100/2.9  ABG lactate: --

## 2018-02-11 NOTE — PROGRESS NOTE ADULT - PROBLEM SELECTOR PLAN 1
- tube feeds resumed, patient now with hyperglycemia  - recommend increase NPH to 32 units q6 hours with moderate correction scale. Please hold NPH when tube feeds are held. As tube feeds are typically held for 6 hours in the AM, patient should received scheduled doses of NPH at 12 am, then 12 pm, 6 pm  - will follow

## 2018-02-11 NOTE — PROGRESS NOTE ADULT - ASSESSMENT
67F w PMH of bilateral Breast CA (on Trastuzumab, last dose Jan 20th) admitted to MICU with influenza PNA c/b PEA arrest x 2 with ROSC x 2 (10 min + 30 min). CPR c/b bilateral PTX (s/p bilateral chest tubes), pneumoperitoneum (s/p Peritoneal drain (pigtail) in right hemiabdomen and paracentesis decompression) a/w extensive subcutaneous emphysema. Pt received 100 mg of empiric TPA in ED. Surgery consulted and not recommending surgical intervention.   All 3 chest tubes removed on 1/31. Pt had cardiac MRI on 2/1 which was wnl. Right sided IJ removed and right shiley placed and HD initiated on 2/1. Abdominal drain removed on 2/1.  Patients pneumothorax resolved, still with L chest tube. Chest tube clamped today with possible removal later today.     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 Patients neurologic status improving- obeys commands and spontaneously moves all extremities. Alert and Oriented x2.     Skin:  Lines: Right IJ Shiley  Decubiti: None  Erythematous rash- likely contact dermatitis from ECG leads- topical hydrocortisone     GI:  NG tube inserted yesterday. Feeds restarted    Metabolic:  BMP showing evidence of RUSS 2/2 ATN 2/2 PEA arrest.   - still anuric- last HD yesterday   - renal on case, appreciate recs    LIVER FUNCTIONS   Patient had evidence of shock liver 2/2 to prolonged PEA arrest. ALT and AST were both in the thousands, now trending down     Endocrine  FS well controlled ON  - C/w NPH 28 q6h+ moderate ISS    Volume Assessment:  No peripheral edema    Hematologic:          DVT prophylaxis with HSQ     Infectious Disease:  s/p oseltamivir for influenza  s/p 7 days of Zosyn 7/7 for possible aspiration PNA  afebrile without sings of infection off abx    Hemodynamics:  hemodynamically stable off pressors    Cardiovascular:.  Cardiac MRI unremarkable. Shows normal RV and LV function.   c/w Lipitor 40 once patient is no longer NPO  patient was hypertensive yesterday- restarted coreg 12.5mg BID    Respiratory:  Pt had left chest tube replaced   Chest xray from 2/6: "Left chest tube with reexpansion of lung. No pneumothorax"  chest tube on water suction yesterday- still no pneumothorax, US today showed lung sliding. Will clamp chest tube today, repeat CXR and possibly remove L chest tube  c/w matilde 67F w PMH of bilateral Breast CA (on Trastuzumab, last dose Jan 20th) admitted to MICU with influenza PNA c/b PEA arrest x 2 with ROSC x 2 (10 min + 30 min). CPR c/b bilateral PTX (s/p bilateral chest tubes), pneumoperitoneum (s/p Peritoneal drain (pigtail) in right hemiabdomen and paracentesis decompression) a/w extensive subcutaneous emphysema. Pt received 100 mg of empiric TPA in ED. Surgery consulted and not recommending surgical intervention.   All 3 chest tubes removed on 1/31. Pt had cardiac MRI on 2/1 which was wnl. Right sided IJ removed and right shiley placed and HD initiated on 2/1. Abdominal drain removed on 2/1.  Patients pneumothorax resolved, still with L chest tube. Chest tube clamped today with possible removal later today.     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   Alert and Oriented x2.   Appears slightly more lethargic today    Skin:  Lines: Right IJ Shiley  Decubiti: None  Erythematous rash- likely contact dermatitis from ECG leads- topical hydrocortisone     GI:  NG tube inserted yesterday. Feeds restarted    Metabolic:  BMP showing evidence of RUSS 2/2 ATN 2/2 PEA arrest.   still anuric- last HD friday  patient is hypertensive today with fluid in lungs on ultrasound  think patient could benefit from dialysis today for fluids removal- renal eval pending  renal on case, appreciate recs    Endocrine  FS well controlled ON  - C/w NPH 28 q6h+ moderate ISS    Volume Assessment:  edematous    Hematologic:          DVT prophylaxis with HSQ     Infectious Disease:  s/p oseltamivir for influenza  s/p 7 days of Zosyn 7/7 for possible aspiration PNA  afebrile without signs of infection off abx    Hemodynamics:  hemodynamically stable off pressors    Cardiovascular:.  Cardiac MRI unremarkable. Shows normal RV and LV function.   c/w Lipitor 40 once patient is no longer NPO  coreg 12.5mg BID    Respiratory:  Pt had left chest tube replaced   Chest xray from 2/6: "Left chest tube with reexpansion of lung. No pneumothorax"  chest tube on water suction yesterday- still no pneumothorax, US today showed lung sliding. Will clamp chest tube today, repeat CXR and possibly remove L chest tube  c/w matilde 67F w PMH of bilateral Breast CA (on Trastuzumab, last dose Jan 20th) admitted to MICU with influenza PNA c/b PEA arrest x 2 with ROSC x 2 (10 min + 30 min). CPR c/b bilateral PTX (s/p bilateral chest tubes), pneumoperitoneum (s/p Peritoneal drain (pigtail) in right hemiabdomen and paracentesis decompression) a/w extensive subcutaneous emphysema. Pt received 100 mg of empiric TPA in ED. Surgery consulted and not recommending surgical intervention.   All 3 chest tubes removed on 1/31. Pt had cardiac MRI on 2/1 which was wnl. Right sided IJ removed and right shiley placed and HD initiated on 2/1. Abdominal drain removed on 2/1.  Patients pneumothorax resolved, still with L chest tube. 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   Alert and Oriented x2.   Appears slightly more lethargic today    Skin:  Lines: Right IJ Shiley  Decubiti: None  Erythematous rash- likely contact dermatitis from ECG leads- topical hydrocortisone     GI:  NG tube inserted yesterday. Feeds restarted    Metabolic:  BMP showing evidence of RUSS 2/2 ATN 2/2 PEA arrest.   still anuric- last HD friday  patient is hypertensive today with fluid in lungs on ultrasound  think patient could benefit from dialysis today for fluids removal- renal eval pending  renal on case, appreciate recs    Endocrine  FS well controlled ON  - C/w NPH 28 q6h+ moderate ISS    Volume Assessment:  edematous    Hematologic:          DVT prophylaxis with HSQ     Infectious Disease:  s/p oseltamivir for influenza  s/p 7 days of Zosyn 7/7 for possible aspiration PNA  afebrile without signs of infection off abx    Hemodynamics:  hemodynamically stable off pressors    Cardiovascular:.  Cardiac MRI unremarkable. Shows normal RV and LV function.   c/w Lipitor 40 once patient is no longer NPO  coreg 12.5mg BID  patient hypertensive this AM   hydralazine 10mg IVx1   hydralazine 25mg PO q8h    Respiratory:  Pt had left chest tube replaced   Chest xray from 2/6: "Left chest tube with reexpansion of lung. No pneumothorax"  chest tube clamped yesterday, Pneumo seen again on XRAY, chest tube now on water seal. Repeat chest XRAY later today  c/w duonebs

## 2018-02-12 LAB
GLUCOSE BLDC GLUCOMTR-MCNC: 118 MG/DL — HIGH (ref 70–99)
GLUCOSE BLDC GLUCOMTR-MCNC: 131 MG/DL — HIGH (ref 70–99)
GLUCOSE BLDC GLUCOMTR-MCNC: 272 MG/DL — HIGH (ref 70–99)

## 2018-02-12 PROCEDURE — 99222 1ST HOSP IP/OBS MODERATE 55: CPT | Mod: GC

## 2018-02-12 PROCEDURE — 99291 CRITICAL CARE FIRST HOUR: CPT

## 2018-02-12 PROCEDURE — 71045 X-RAY EXAM CHEST 1 VIEW: CPT | Mod: 26

## 2018-02-12 PROCEDURE — 90935 HEMODIALYSIS ONE EVALUATION: CPT | Mod: GC

## 2018-02-12 RX ADMIN — Medication 25 MILLIGRAM(S): at 21:20

## 2018-02-12 RX ADMIN — CARVEDILOL PHOSPHATE 12.5 MILLIGRAM(S): 80 CAPSULE, EXTENDED RELEASE ORAL at 17:16

## 2018-02-12 RX ADMIN — NYSTATIN CREAM 1 APPLICATION(S): 100000 CREAM TOPICAL at 05:09

## 2018-02-12 RX ADMIN — HUMAN INSULIN 30 UNIT(S): 100 INJECTION, SUSPENSION SUBCUTANEOUS at 00:07

## 2018-02-12 RX ADMIN — Medication 3 MILLILITER(S): at 12:28

## 2018-02-12 RX ADMIN — NYSTATIN CREAM 1 APPLICATION(S): 100000 CREAM TOPICAL at 17:18

## 2018-02-12 RX ADMIN — Medication 6: at 05:10

## 2018-02-12 RX ADMIN — Medication 3 MILLILITER(S): at 17:26

## 2018-02-12 RX ADMIN — Medication 3 MILLILITER(S): at 06:01

## 2018-02-12 RX ADMIN — Medication 1 APPLICATION(S): at 12:22

## 2018-02-12 RX ADMIN — CARVEDILOL PHOSPHATE 12.5 MILLIGRAM(S): 80 CAPSULE, EXTENDED RELEASE ORAL at 05:09

## 2018-02-12 RX ADMIN — Medication 8: at 00:12

## 2018-02-12 RX ADMIN — Medication 81 MILLIGRAM(S): at 12:19

## 2018-02-12 RX ADMIN — Medication 3 MILLILITER(S): at 23:11

## 2018-02-12 RX ADMIN — Medication 25 MILLIGRAM(S): at 05:09

## 2018-02-12 RX ADMIN — HUMAN INSULIN 30 UNIT(S): 100 INJECTION, SUSPENSION SUBCUTANEOUS at 12:22

## 2018-02-12 RX ADMIN — HEPARIN SODIUM 5000 UNIT(S): 5000 INJECTION INTRAVENOUS; SUBCUTANEOUS at 16:30

## 2018-02-12 RX ADMIN — HEPARIN SODIUM 5000 UNIT(S): 5000 INJECTION INTRAVENOUS; SUBCUTANEOUS at 08:42

## 2018-02-12 RX ADMIN — HEPARIN SODIUM 5000 UNIT(S): 5000 INJECTION INTRAVENOUS; SUBCUTANEOUS at 00:06

## 2018-02-12 RX ADMIN — ATORVASTATIN CALCIUM 40 MILLIGRAM(S): 80 TABLET, FILM COATED ORAL at 21:20

## 2018-02-12 RX ADMIN — HUMAN INSULIN 30 UNIT(S): 100 INJECTION, SUSPENSION SUBCUTANEOUS at 17:17

## 2018-02-12 NOTE — PROGRESS NOTE ADULT - PROBLEM SELECTOR PLAN 1
likely from ATN in setting of cardiac arrest. Pt continues to be oligo anuric  PUF on 02/11 for volume overload with  worsening SOB   Plan for HD today.

## 2018-02-12 NOTE — PROGRESS NOTE ADULT - SUBJECTIVE AND OBJECTIVE BOX
[facility] DIVISION OF KIDNEY DISEASES AND HYPERTENSION -- FOLLOW UP NOTE  --------------------------------------------------------------------------------  Chief Complaint:    24 hour events/subjective:        PAST HISTORY  --------------------------------------------------------------------------------  No significant changes to PMH, PSH, FHx, SHx, unless otherwise noted    ALLERGIES & MEDICATIONS  --------------------------------------------------------------------------------  Allergies    NIFEdipine (Urticaria; Hives)  vitamin E (Short breath; Urticaria; Hives)    Intolerances      Standing Inpatient Medications  ALBUTerol/ipratropium for Nebulization 3 milliLiter(s) Nebulizer every 6 hours  aspirin  chewable 81 milliGRAM(s) Oral daily  atorvastatin 40 milliGRAM(s) Oral at bedtime  carvedilol 12.5 milliGRAM(s) Oral every 12 hours  dextrose 5%. 1000 milliLiter(s) IV Continuous <Continuous>  dextrose 50% Injectable 12.5 Gram(s) IV Push once  dextrose 50% Injectable 25 Gram(s) IV Push once  dextrose 50% Injectable 25 Gram(s) IV Push once  heparin  Injectable 5000 Unit(s) SubCutaneous every 8 hours  hydrALAZINE 25 milliGRAM(s) Oral every 8 hours  hydrocortisone 1% Cream 1 Application(s) Topical daily  insulin lispro (HumaLOG) corrective regimen sliding scale   SubCutaneous every 6 hours  insulin NPH human recombinant 30 Unit(s) SubCutaneous <User Schedule>  nystatin Powder 1 Application(s) Topical two times a day    PRN Inpatient Medications  chlorhexidine 0.12% Liquid 15 milliLiter(s) Swish and Spit every 4 hours PRN  dextrose Gel 1 Dose(s) Oral once PRN  glucagon  Injectable 1 milliGRAM(s) IntraMuscular once PRN      REVIEW OF SYSTEMS  --------------------------------------------------------------------------------  Gen: No weight changes, fatigue, fevers/chills, weakness  Skin: No rashes  Head/Eyes/Ears/Mouth: No headache; Normal hearing; Normal vision w/o blurriness; No sinus pain/discomfort, sore throat  Respiratory: No dyspnea, cough, wheezing, hemoptysis  CV: No chest pain, PND, orthopnea  GI: No abdominal pain, diarrhea, constipation, nausea, vomiting, melena, hematochezia  : No increased frequency, dysuria, hematuria, nocturia  MSK: No joint pain/swelling; no back pain; no edema  Neuro: No dizziness/lightheadedness, weakness, seizures, numbness, tingling  Heme: No easy bruising or bleeding  Endo: No heat/cold intolerance  Psych: No significant nervousness, anxiety, stress, depression    All other systems were reviewed and are negative, except as noted.    VITALS/PHYSICAL EXAM  --------------------------------------------------------------------------------  T(C): 36.8 (02-12-18 @ 04:00), Max: 37.1 (02-11-18 @ 20:00)  HR: 86 (02-12-18 @ 06:30) (79 - 100)  BP: 111/51 (02-12-18 @ 06:30) (107/51 - 184/71)  RR: 22 (02-12-18 @ 06:30) (16 - 182)  SpO2: 98% (02-12-18 @ 06:30) (95% - 100%)  Wt(kg): --        02-11-18 @ 07:01  -  02-12-18 @ 07:00  --------------------------------------------------------  IN: 2380 mL / OUT: 2225 mL / NET: 155 mL      Physical Exam:  	Gen: NAD, well-appearing  	HEENT: PERRL, supple neck, clear oropharynx  	Pulm: CTA B/L  	CV: RRR, S1S2; no rub  	Back: No spinal or CVA tenderness; no sacral edema  	Abd: +BS, soft, nontender/nondistended  	: No suprapubic tenderness  	UE: Warm, FROM, no clubbing, intact strength; no edema; no asterixis  	LE: Warm, FROM, no clubbing, intact strength; no edema  	Neuro: No focal deficits, intact gait  	Psych: Normal affect and mood  	Skin: Warm, without rashes  	Vascular access:    LABS/STUDIES  --------------------------------------------------------------------------------              7.8    5.8   >-----------<  279      [02-11-18 @ 23:23]              24.4     134  |  94  |  42  ----------------------------<  338      [02-11-18 @ 23:23]  4.6   |  26  |  3.13        Ca     8.2     [02-11-18 @ 23:23]      Mg     2.2     [02-11-18 @ 23:23]      Phos  5.8     [02-11-18 @ 23:23]    TPro  6.2  /  Alb  2.3  /  TBili  0.7  /  DBili  x   /  AST  40  /  ALT  71  /  AlkPhos  254  [02-11-18 @ 23:23]    PT/INR: PT 9.7  , INR 0.90       [02-11-18 @ 23:23]  PTT: 27.4       [02-11-18 @ 23:23]      Creatinine Trend:  SCr 3.13 [02-11 @ 23:23]  SCr 3.81 [02-10 @ 23:33]  SCr 2.51 [02-09 @ 23:24]  SCr 3.59 [02-09 @ 00:00]  SCr 2.02 [02-08 @ 01:27]    Urinalysis - [01-30-18 @ 12:52]      Color Yellow / Appearance SL Turbid / SG 1.025 / pH 6.0      Gluc 50 / Ketone Negative  / Bili Negative / Urobili Negative       Blood Trace / Protein 150 / Leuk Est Negative / Nitrite Negative      RBC 3-5 / WBC  / Hyaline  / Gran  / Sq Epi  / Non Sq Epi OCC / Bacteria       HbA1c 8.6      [01-31-18 @ 07:15]  Lipid: chol 183, , HDL 10,       [02-07-18 @ 13:34]    HBsAg Nonreact      [02-01-18 @ 22:49]  HCV 0.16, Nonreact      [02-01-18 @ 22:49]

## 2018-02-12 NOTE — PROGRESS NOTE ADULT - ATTENDING COMMENTS
Agree with above. Patient seen and examined.  -Influenza - s/p treatment now with improvement in respiratory status  -Cardiac arrest - ~35 min in total with ROSC - now no longer in shock  -BIlateral pneumothoraces - now with  a single remaining chest tube in left lung - patient without respiratory distress - will plan to clamp tube and check CXR - no significant lung sliding on US this AM while lung was up so would not be best to follow PTX with US - hopeful to remove chest tube in next 24-48 hours  -Acute Renal failure after cardiac arrest - now continue HD  -CVA     Critical Care Time 35 minutes

## 2018-02-12 NOTE — CONSULT NOTE ADULT - SUBJECTIVE AND OBJECTIVE BOX
CC = generalized weakness, difficulty walking     HPI:  66y/o F with PMH As below admitted to [facility] on 1/30/18 with fevers and generalized weakness, found to be RVP/Flu positive. In ED went into PEA/ardiac arrest with ROSC and second episode of PEA/cardiac arrest after intubated in esophagus, found to have bilateral pneumothoraces, s/p chest tube x3 (removed 1/31 and 1 replaced on 2/1 for left PTX) and abdominal paracentesis for abdominal decompression (removed 2/1).     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 NGT feeds for nutrition.       REVIEW OF SYSTEMS  Constitutional - No fever, No weight loss, No fatigue  HEENT - No eye pain, No visual disturbances, No difficulty hearing, No tinnitus, No vertigo, No neck pain  Respiratory - No cough, No wheezing, No shortness of breath  Cardiovascular - No chest pain, No palpitations  Gastrointestinal - No abdominal pain, No nausea, No vomiting, No diarrhea, No constipation  Genitourinary - No dysuria, No frequency, No hematuria, No incontinence  Neurological - No headaches, No memory loss, No loss of strength, No numbness, No tremors  Skin - No itching, No rashes, No lesions   Endocrine - No temperature intolerance  Musculoskeletal - No joint pain, No joint swelling, No muscle pain  Psychiatric - No depression, No anxiety    PAST MEDICAL & SURGICAL HISTORY  Diabetes  Breast CA  H/O mastectomy, bilateral  No significant past surgical history      SOCIAL HISTORY  Smoking - Denied  EtOH - Denied   Drugs - Denied    FUNCTIONAL HISTORY  Lives with  and daughter in 2nd floor [address]   Independent with ADLs and ambulation without AD; Required assistance with bathing,  cooking/cleaning     CURRENT FUNCTIONAL STATUS  2/9  Bed mobility - max assist  Sit-stand - dependent  LE dressing - max assist    FAMILY HISTORY   No pertinent family history in first degree relatives      RECENT LABS/IMAGING  CBC Full  -  ( 11 Feb 2018 23:23 )  WBC Count : 5.8 K/uL  Hemoglobin : 7.8 g/dL  Hematocrit : 24.4 %  Platelet Count - Automated : 279 K/uL  Mean Cell Volume : 92.8 fl  Mean Cell Hemoglobin : 29.6 pg  Mean Cell Hemoglobin Concentration : 31.9 gm/dL  Auto Neutrophil # : x  Auto Lymphocyte # : x  Auto Monocyte # : x  Auto Eosinophil # : x  Auto Basophil # : x  Auto Neutrophil % : x  Auto Lymphocyte % : x  Auto Monocyte % : x  Auto Eosinophil % : x  Auto Basophil % : x    02-11    134<L>  |  94<L>  |  42<H>  ----------------------------<  338<H>  4.6   |  26  |  3.13<H>    Ca    8.2<L>      11 Feb 2018 23:23  Phos  5.8     02-11  Mg     2.2     02-11    TPro  6.2  /  Alb  2.3<L>  /  TBili  0.7  /  DBili  x   /  AST  40  /  ALT  71<H>  /  AlkPhos  254<H>  02-11        VITALS  T(C): 36.8 (02-12-18 @ 04:00), Max: 37.1 (02-11-18 @ 20:00)  HR: 86 (02-12-18 @ 06:30) (79 - 100)  BP: 111/51 (02-12-18 @ 06:30) (107/51 - 184/71)  RR: 22 (02-12-18 @ 06:30) (16 - 182)  SpO2: 98% (02-12-18 @ 06:30) (95% - 100%)  Wt(kg): --    ALLERGIES  NIFEdipine (Urticaria; Hives)  vitamin E (Short breath; Urticaria; Hives)      MEDICATIONS   ALBUTerol/ipratropium for Nebulization 3 milliLiter(s) Nebulizer every 6 hours  aspirin  chewable 81 milliGRAM(s) Oral daily  atorvastatin 40 milliGRAM(s) Oral at bedtime  carvedilol 12.5 milliGRAM(s) Oral every 12 hours  chlorhexidine 0.12% Liquid 15 milliLiter(s) Swish and Spit every 4 hours PRN  dextrose 5%. 1000 milliLiter(s) IV Continuous <Continuous>  dextrose 50% Injectable 12.5 Gram(s) IV Push once  dextrose 50% Injectable 25 Gram(s) IV Push once  dextrose 50% Injectable 25 Gram(s) IV Push once  dextrose Gel 1 Dose(s) Oral once PRN  glucagon  Injectable 1 milliGRAM(s) IntraMuscular once PRN  heparin  Injectable 5000 Unit(s) SubCutaneous every 8 hours  hydrALAZINE 25 milliGRAM(s) Oral every 8 hours  hydrocortisone 1% Cream 1 Application(s) Topical daily  insulin lispro (HumaLOG) corrective regimen sliding scale   SubCutaneous every 6 hours  insulin NPH human recombinant 30 Unit(s) SubCutaneous <User Schedule>  nystatin Powder 1 Application(s) Topical two times a day      ----------------------------------------------------------------------------------------  PHYSICAL EXAM  Constitutional - NAD, Comfortable  HEENT - NCAT, EOMI  Neck - Supple, No limited ROM  Chest - CTA bilaterally, No wheeze, No rhonchi, No crackles  Cardiovascular - RRR, S1S2, No murmurs  Abdomen - BS+, Soft, NTND  Extremities - No C/C/E, No calf tenderness   Neurologic Exam -                    Cognitive - Awake, Alert, AAO to self, place, date, year, situation     Communication - Fluent, No dysarthria     Cranial Nerves - CN 2-12 intact     Motor - No focal deficits                    LEFT    UE - ShAB 5/5, EF 5/5, EE 5/5, WE 5/5,  5/5                    RIGHT UE - ShAB 5/5, EF 5/5, EE 5/5, WE 5/5,  5/5                    LEFT    LE - HF 5/5, KE 5/5, DF 5/5, PF 5/5                    RIGHT LE - HF 5/5, KE 5/5, DF 5/5, PF 5/5        Sensory - Intact to LT     Reflexes - DTR Intact, No primitive reflexive     Coordination - FTN intact     OculoVestibular - No saccades, No nystagmus, VOR         Balance - WNL Static  Psychiatric - Mood stable, Affect WNL  ----------------------------------------------------------------------------------------  ASSESSMENT/PLAN - 67F with Flu and PEA (x2) with bilateral acute centrumsemiovale ovale watershed infarcts and acute left occipital lobe infarct with gait, ADL impairments.     Diet - NGT feeds  CVA - asa, lipitor, HISS   RUSS- on HD  DM- nph   Pain -  DVT PPX - HSQ  Skin - Turn Q2  Precautions - Fall  PT&OT- ROM, strengthening, ADL/gait/balance training with AD  Dispo - CC-  generalized weakness, difficulty walking     HPI:  66y/o F with PMH As below admitted to [facility] on 1/30/18 with fevers and generalized weakness, found to be RVP/Flu positive. In ED went into PEA/ardiac arrest with ROSC and second episode of PEA/cardiac arrest after intubated in esophagus, found to have bilateral pneumothoraces, s/p chest tube x3 (removed 1/31 and 1 replaced on 2/1 for left PTX) and abdominal paracentesis for abdominal decompression (removed 2/1).     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 NGT feeds for nutrition.       REVIEW OF SYSTEMS  Constitutional - + fatigue  HEENT - No eye pain, No visual disturbances  Respiratory - No cough,  No shortness of breath  Cardiovascular - No chest pain, No palpitations  Gastrointestinal - No abdominal pain  Genitourinary - No dysuria  Neurological - No headaches  Skin - No itching, No rashes, No lesions   Musculoskeletal - No joint pain, No joint swelling, No muscle pain  Psychiatric - No depression, No anxiety    PAST MEDICAL & SURGICAL HISTORY  Diabetes  Breast CA  H/O mastectomy, bilateral  No significant past surgical history    SOCIAL HISTORY  Smoking - Denied  EtOH - Denied   Drugs - Denied    FUNCTIONAL HISTORY  Lives with  and daughter in 2nd floor [address] [place]   Independent with ADLs and ambulation without AD; Required assistance with bathing,  cooking/cleaning     CURRENT FUNCTIONAL STATUS  2/9  Bed mobility - max assist  Sit-stand - dependent  LE dressing - max assist    FAMILY HISTORY   No pertinent family history in first degree relatives    RECENT LABS/IMAGING  CBC Full  -  ( 11 Feb 2018 23:23 )  WBC Count : 5.8 K/uL  Hemoglobin : 7.8 g/dL  Hematocrit : 24.4 %  Platelet Count - Automated : 279 K/uL    02-11    134<L>  |  94<L>  |  42<H>  ----------------------------<  338<H>  4.6   |  26  |  3.13<H>    Ca    8.2<L>      11 Feb 2018 23:23  Phos  5.8     02-11  Mg     2.2     02-11    TPro  6.2  /  Alb  2.3<L>  /  TBili  0.7  /  DBili  x   /  AST  40  /  ALT  71<H>  /  AlkPhos  254<H>  02-11        VITALS  T(C): 36.8 (02-12-18 @ 04:00), Max: 37.1 (02-11-18 @ 20:00)  HR: 86 (02-12-18 @ 06:30) (79 - 100)  BP: 111/51 (02-12-18 @ 06:30) (107/51 - 184/71)  RR: 22 (02-12-18 @ 06:30) (16 - 182)  SpO2: 98% (02-12-18 @ 06:30) (95% - 100%)  Wt(kg): --    ALLERGIES  NIFEdipine (Urticaria; Hives)  vitamin E (Short breath; Urticaria; Hives)      MEDICATIONS   ALBUTerol/ipratropium for Nebulization 3 milliLiter(s) Nebulizer every 6 hours  aspirin  chewable 81 milliGRAM(s) Oral daily  atorvastatin 40 milliGRAM(s) Oral at bedtime  carvedilol 12.5 milliGRAM(s) Oral every 12 hours  chlorhexidine 0.12% Liquid 15 milliLiter(s) Swish and Spit every 4 hours PRN  dextrose 5%. 1000 milliLiter(s) IV Continuous <Continuous>  dextrose 50% Injectable 12.5 Gram(s) IV Push once  dextrose 50% Injectable 25 Gram(s) IV Push once  dextrose 50% Injectable 25 Gram(s) IV Push once  dextrose Gel 1 Dose(s) Oral once PRN  glucagon  Injectable 1 milliGRAM(s) IntraMuscular once PRN  heparin  Injectable 5000 Unit(s) SubCutaneous every 8 hours  hydrALAZINE 25 milliGRAM(s) Oral every 8 hours  hydrocortisone 1% Cream 1 Application(s) Topical daily  insulin lispro (HumaLOG) corrective regimen sliding scale   SubCutaneous every 6 hours  insulin NPH human recombinant 30 Unit(s) SubCutaneous <User Schedule>  nystatin Powder 1 Application(s) Topical two times a day      ----------------------------------------------------------------------------------------  PHYSICAL EXAM  Constitutional - NAD, Comfortable  HEENT - NCAT, EOMI  Chest - No distress, O2 by NC, chest tube draining   Cardiovascular - RRR, S1S2  Abdomen - Soft, NTND  Extremities -  No calf tenderness   Neurologic Exam -                    Cognitive - Awake, Alert, AAO to self, Hospital      Communication - hypophonia      Cranial Nerves - Loss right nasolabial fold     Motor - Limited motor exam due to partcipation                 Right UE- EF/EE 5/5,  at least 3/5  	   Left UE - EE- 2/5  1/5                 Spontaneous movement observed in b/l LE but does not participate in motor exam/resistance testing.       Reflexes - Negative HOffmans b/l  ----------------------------------------------------------------------------------------  ASSESSMENT/PLAN - 67F with Flu and PEA (x2) with bilateral acute centrumsemiovale ovale watershed infarcts and acute left occipital lobe infarct with gait, ADL impairments.     Diet - NGT feeds  CVA - asa, lipitor, HISS   RUSS- on HD  DM- nph   Pain -  DVT PPX - HSQ  Skin - Turn Q2  Precautions - Fall  PT&OT- ROM, strengthening, ADL/gait/balance training with AD  Dispo - CC-  generalized weakness, difficulty walking     HPI:  68y/o F with PMH As below admitted to [facility] on 1/30/18 with fevers and generalized weakness, found to be RVP/Flu positive. In ED went into PEA/ardiac arrest with ROSC and second episode of PEA/cardiac arrest after intubated in esophagus, found to have bilateral pneumothoraces, s/p chest tube x3 (removed 1/31 and 1 replaced on 2/1 for left PTX) and abdominal paracentesis for abdominal decompression (removed 2/1).     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 NGT feeds for nutrition.       REVIEW OF SYSTEMS  Constitutional - + fatigue  HEENT - No eye pain, No visual disturbances  Respiratory - No cough,  No shortness of breath  Cardiovascular - No chest pain, No palpitations  Gastrointestinal - No abdominal pain  Genitourinary - No dysuria  Neurological - No headaches  Skin - No itching, No rashes, No lesions   Musculoskeletal - No joint pain, No joint swelling, No muscle pain  Psychiatric - No depression, No anxiety    PAST MEDICAL & SURGICAL HISTORY  Diabetes  Breast CA  H/O mastectomy, bilateral    SOCIAL HISTORY  Smoking - Denied  EtOH - Denied   Drugs - Denied    FUNCTIONAL HISTORY  Lives with  and daughter in 2nd floor apt [address]   Independent with ADLs and ambulation without AD; Required assistance with bathing,  cooking/cleaning     CURRENT FUNCTIONAL STATUS  2/9  Bed mobility - max assist  Sit-stand - dependent  LE dressing - max assist    FAMILY HISTORY   No pertinent family history in first degree relatives    RECENT LABS/IMAGING  CBC Full  -  ( 11 Feb 2018 23:23 )  WBC Count : 5.8 K/uL  Hemoglobin : 7.8 g/dL  Hematocrit : 24.4 %  Platelet Count - Automated : 279 K/uL    02-11    134<L>  |  94<L>  |  42<H>  ----------------------------<  338<H>  4.6   |  26  |  3.13<H>    Ca    8.2<L>      11 Feb 2018 23:23  Phos  5.8     02-11  Mg     2.2     02-11    TPro  6.2  /  Alb  2.3<L>  /  TBili  0.7  /  DBili  x   /  AST  40  /  ALT  71<H>  /  AlkPhos  254<H>  02-11        VITALS  T(C): 36.8 (02-12-18 @ 04:00), Max: 37.1 (02-11-18 @ 20:00)  HR: 86 (02-12-18 @ 06:30) (79 - 100)  BP: 111/51 (02-12-18 @ 06:30) (107/51 - 184/71)  RR: 22 (02-12-18 @ 06:30) (16 - 182)  SpO2: 98% (02-12-18 @ 06:30) (95% - 100%)  Wt(kg): --    ALLERGIES  NIFEdipine (Urticaria; Hives)  vitamin E (Short breath; Urticaria; Hives)      MEDICATIONS   ALBUTerol/ipratropium for Nebulization 3 milliLiter(s) Nebulizer every 6 hours  aspirin  chewable 81 milliGRAM(s) Oral daily  atorvastatin 40 milliGRAM(s) Oral at bedtime  carvedilol 12.5 milliGRAM(s) Oral every 12 hours  chlorhexidine 0.12% Liquid 15 milliLiter(s) Swish and Spit every 4 hours PRN  dextrose 5%. 1000 milliLiter(s) IV Continuous <Continuous>  dextrose 50% Injectable 12.5 Gram(s) IV Push once  dextrose 50% Injectable 25 Gram(s) IV Push once  dextrose 50% Injectable 25 Gram(s) IV Push once  dextrose Gel 1 Dose(s) Oral once PRN  glucagon  Injectable 1 milliGRAM(s) IntraMuscular once PRN  heparin  Injectable 5000 Unit(s) SubCutaneous every 8 hours  hydrALAZINE 25 milliGRAM(s) Oral every 8 hours  hydrocortisone 1% Cream 1 Application(s) Topical daily  insulin lispro (HumaLOG) corrective regimen sliding scale   SubCutaneous every 6 hours  insulin NPH human recombinant 30 Unit(s) SubCutaneous <User Schedule>  nystatin Powder 1 Application(s) Topical two times a day      ----------------------------------------------------------------------------------------  PHYSICAL EXAM  Constitutional - NAD, Comfortable  HEENT - NCAT, EOMI, + NGT  Chest - No distress, O2 by NC, chest tube draining   Cardiovascular - RRR, S1S2  Abdomen - Soft, NTND  Extremities -  No calf tenderness   Neurologic Exam -                    Cognitive - Awake, Alert, AAO to self, Hospital      Communication - hypophonia      Cranial Nerves - Loss right nasolabial fold     Motor - Limited motor exam due to partcipation                 Right UE antigravity; Left UE antigravity, weaker than right                 Left LE antigravity +DF, right LE in DF assist boot      Reflexes - Negative HOffmans b/l  ----------------------------------------------------------------------------------------  ASSESSMENT/PLAN - 67F with Flu and PEA (x2) with 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     Diet - NGT feeds  CVA - asa, lipitor, HISS   RUSS- on HD  DM- nph   Pain -  DVT PPX - HSQ  Skin - Turn Q2  Precautions - Fall  PT&OT- ROM, strengthening, ADL/gait/balance training with AD  Dispo - Once medically stable, recommend ACUTE inpatient rehabilitation for the functional deficits consisting of 3 hours of therapy/day & 24 hour RN/daily PMR physician for comorbid medical management.

## 2018-02-12 NOTE — PROGRESS NOTE ADULT - SUBJECTIVE AND OBJECTIVE BOX
Interval Events: Patient hypertensive ON. Given IV labetalol x1    REVIEW OF SYSTEMS:  Constitutional: [ ] negative [ ] fevers [ ] chills [ ] weight loss [ ] weight gain  HEENT: [ ] negative [ ] dry eyes [ ] eye irritation [ ] postnasal drip [ ] nasal congestion  CV: [ ] negative  [ ] chest pain [ ] orthopnea [ ] palpitations [ ] murmur  Resp: [ ] negative [ ] cough [ ] shortness of breath [ ] dyspnea [ ] wheezing [ ] sputum [ ] hemoptysis  GI: [ ] negative [ ] nausea [ ] vomiting [ ] diarrhea [ ] constipation [ ] abd pain [ ] dysphagia   : [ ] negative [ ] dysuria [ ] nocturia [ ] hematuria [ ] increased urinary frequency  Musculoskeletal: [ ] negative [ ] back pain [ ] myalgias [ ] arthralgias [ ] fracture  Skin: [ ] negative [ ] rash [ ] itch  Neurological: [ ] negative [ ] headache [ ] dizziness [ ] syncope [ ] weakness [ ] numbness  Psychiatric: [ ] negative [ ] anxiety [ ] depression  Endocrine: [ ] negative [ ] diabetes [ ] thyroid problem  Hematologic/Lymphatic: [ ] negative [ ] anemia [ ] bleeding problem  Allergic/Immunologic: [ ] negative [ ] itchy eyes [ ] nasal discharge [ ] hives [ ] angioedema  [ ] All other systems negative  [x ] Unable to assess ROS because patient is fatigued this AM    OBJECTIVE:  ICU Vital Signs Last 24 Hrs  T(C): 36.8 (12 Feb 2018 12:30), Max: 37.1 (11 Feb 2018 20:00)  T(F): 98.2 (12 Feb 2018 12:30), Max: 98.7 (11 Feb 2018 20:00)  HR: 83 (12 Feb 2018 12:30) (75 - 100)  BP: 158/70 (12 Feb 2018 12:30) (107/51 - 177/74)  BP(mean): 96 (12 Feb 2018 12:00) (74 - 106)  ABP: --  ABP(mean): --  RR: 22 (12 Feb 2018 12:30) (16 - 182)  SpO2: 98% (12 Feb 2018 12:30) (95% - 100%)        02-11 @ 07:01  -  02-12 @ 07:00  --------------------------------------------------------  IN: 2380 mL / OUT: 2225 mL / NET: 155 mL    02-12 @ 07:01  - 02-12 @ 13:46  --------------------------------------------------------  IN: 220 mL / OUT: 0 mL / NET: 220 mL      CAPILLARY BLOOD GLUCOSE  338 (12 Feb 2018 00:00)      POCT Blood Glucose.: 118 mg/dL (12 Feb 2018 11:45)      PHYSICAL EXAM:  General: NAD, appears tired,   HEENT: AT/NC   Respiratory: L chest tube present- ronchi present anteriorly  Cardiovascular: RRR, (+) S1/S2  Abdomen: soft- non-tender  Extremities- no clubbing, cyanosis,   Skin: erythematous rash on chest in distribution where ECG leads are present   Neurological: obeys commands, spontaneously moves all extremities  Psychiatry: alert and orientedx1-2  LINES:    HOSPITAL MEDICATIONS:  aspirin  chewable 81 milliGRAM(s) Oral daily  heparin  Injectable 5000 Unit(s) SubCutaneous every 8 hours      carvedilol 12.5 milliGRAM(s) Oral every 12 hours  hydrALAZINE 25 milliGRAM(s) Oral every 8 hours    atorvastatin 40 milliGRAM(s) Oral at bedtime  dextrose 50% Injectable 12.5 Gram(s) IV Push once  dextrose 50% Injectable 25 Gram(s) IV Push once  dextrose 50% Injectable 25 Gram(s) IV Push once  dextrose Gel 1 Dose(s) Oral once PRN  glucagon  Injectable 1 milliGRAM(s) IntraMuscular once PRN  insulin lispro (HumaLOG) corrective regimen sliding scale   SubCutaneous every 6 hours  insulin NPH human recombinant 30 Unit(s) SubCutaneous <User Schedule>    ALBUTerol/ipratropium for Nebulization 3 milliLiter(s) Nebulizer every 6 hours            dextrose 5%. 1000 milliLiter(s) IV Continuous <Continuous>      chlorhexidine 0.12% Liquid 15 milliLiter(s) Swish and Spit every 4 hours PRN  hydrocortisone 1% Cream 1 Application(s) Topical daily  nystatin Powder 1 Application(s) Topical two times a day        LABS:                        7.8    5.8   )-----------( 279      ( 11 Feb 2018 23:23 )             24.4     Hgb Trend: 7.8<--, 8.0<--, 8.2<--, 8.6<--, 8.7<--  02-11    134<L>  |  94<L>  |  42<H>  ----------------------------<  338<H>  4.6   |  26  |  3.13<H>    Ca    8.2<L>      11 Feb 2018 23:23  Phos  5.8     02-11  Mg     2.2     02-11    TPro  6.2  /  Alb  2.3<L>  /  TBili  0.7  /  DBili  x   /  AST  40  /  ALT  71<H>  /  AlkPhos  254<H>  02-11    Creatinine Trend: 3.13<--, 3.81<--, 2.51<--, 3.59<--, 2.02<--, 3.78<--  PT/INR - ( 11 Feb 2018 23:23 )   PT: 9.7 sec;   INR: 0.90 ratio         PTT - ( 11 Feb 2018 23:23 )  PTT:27.4 sec    Arterial Blood Gas:  02-11 @ 05:20  7.37/50/162/28/100/2.9  ABG lactate: --        MICROBIOLOGY:     RADIOLOGY:  [ ] Reviewed and interpreted by me    EKG:

## 2018-02-12 NOTE — CHART NOTE - NSCHARTNOTEFT_GEN_A_CORE
Follow up.    67F PMH HTN, DM2, breast ca on trastuzumab, s/p b/l mastectomy came to [facility] with Influenza A. She went into acute hypoxic resp failure in the ED requiring intubation (initial esophageal intubation - thereafter corrected to tracheal intubation). Subsequently, she went into PEA cardiac arrest x2 . She received tPA for suspected PE and had 2 chest tubes and a peritoneal drain placed during/after CPR. Course further complicated by septic/cardiogenic shock, RUSS/ATN, shock liver, pneumonia, left pneumothorax and pneumoperitoneum (no intraabdominal viscus perforation is found). Last HD 2/11.    Source: Patient [ ]    Family [ ]     other [ x] chart    Diet : 2/10 Vital 1.2 80cc/hr x 18 hrs via NGT    GI: no V/D/abd distention, last BM 2/12     Enteral /Parenteral Nutrition: goal 1440cc/day, 24 hr intake 2/12 1440cc (100% estimated needs),       Current Weight:   % Weight Change    Pertinent Medications: MEDICATIONS  (STANDING):  ALBUTerol/ipratropium for Nebulization 3 milliLiter(s) Nebulizer every 6 hours  aspirin  chewable 81 milliGRAM(s) Oral daily  atorvastatin 40 milliGRAM(s) Oral at bedtime  carvedilol 12.5 milliGRAM(s) Oral every 12 hours  dextrose 5%. 1000 milliLiter(s) (50 mL/Hr) IV Continuous <Continuous>  dextrose 50% Injectable 12.5 Gram(s) IV Push once  dextrose 50% Injectable 25 Gram(s) IV Push once  dextrose 50% Injectable 25 Gram(s) IV Push once  heparin  Injectable 5000 Unit(s) SubCutaneous every 8 hours  hydrALAZINE 25 milliGRAM(s) Oral every 8 hours  hydrocortisone 1% Cream 1 Application(s) Topical daily  insulin lispro (HumaLOG) corrective regimen sliding scale   SubCutaneous every 6 hours  insulin NPH human recombinant 30 Unit(s) SubCutaneous <User Schedule>  nystatin Powder 1 Application(s) Topical two times a day    MEDICATIONS  (PRN):  chlorhexidine 0.12% Liquid 15 milliLiter(s) Swish and Spit every 4 hours PRN mouth hygiene  dextrose Gel 1 Dose(s) Oral once PRN Blood Glucose LESS THAN 70 milliGRAM(s)/deciliter  glucagon  Injectable 1 milliGRAM(s) IntraMuscular once PRN Glucose LESS THAN 70 milligrams/deciliter    Pertinent Labs:  02-11 Na134 mmol/L<L> Glu 338 mg/dL<H> K+ 4.6 mmol/L Cr  3.13 mg/dL<H> BUN 42 mg/dL<H> Phos 5.8 mg/dL<H> Alb 2.3 g/dL<L> PAB n/a         Skin:     Estimated Needs:   [ ] no change since previous assessment  [ ] recalculated:       Previous Nutrition Diagnosis:     [ ] Inadequate Energy Intake [ ]Inadequate Oral Intake [ ] Excessive Energy Intake     [ ] Underweight [ ] Increased Nutrient Needs [ ] Overweight/Obesity     [ ] Altered GI Function [ ] Unintended Weight Loss [ ] Food & Nutrition Related Knowledge Deficit [ ] Malnutrition          Nutrition Diagnosis is [ ] ongoing  [ ] resolved [ ] not applicable          New Nutrition Diagnosis: [ ] not applicable    [ ] Inadequate Protein Energy Intake [ ]Inadequate Oral Intake [ ] Excessive Energy Intake     [ ] Underweight [ ] Increased Nutrient Needs [ ] Overweight/Obesity     [ ] Altered GI Function [ ] Unintended Weight Loss [ ] Food & Nutrition Related Knowledge Deficit[ ] Limited Adherence to nutrition related recommendations [ ] Malnutrition  [ ] other: Free text       Related to:      As evidenced by:      Interventions:     Recommend    [ ] Change Diet To:    [ ] Nutrition Supplement    [X ] Nutrition Support: Vital 1.2  goal 80cc/hr x 18 hrs to provide 1720 kcals, 108 gm protein, 1168cc free water for 24 kcals/kg, 1.5 gm protein/kg assessment wt of 72.1kg.     [ ] Other:        Monitoring and Evaluation:     [ ] PO intake [ ] Tolerance to diet prescription [ ] weights [ ] follow up per protocol    [ ] other: Follow up.    67F PMH HTN, DM2, breast ca on trastuzumab, s/p b/l mastectomy came to [facility] with Influenza A. She went into acute hypoxic resp failure in the ED requiring intubation (initial esophageal intubation - thereafter corrected to tracheal intubation). Subsequently, she went into PEA cardiac arrest x2 . She received tPA for suspected PE and had 2 chest tubes and a peritoneal drain placed during/after CPR. Course further complicated by septic/cardiogenic shock, RUSS/ATN, shock liver, pneumonia, left pneumothorax and pneumoperitoneum (no intraabdominal viscus perforation is found). Last HD 2/11.    Source: Patient [ ]    Family [ ]     other [ x] chart    Diet : 2/10 Vital 1.2 80cc/hr x 18 hrs via NGT    GI: no V/D/abd distention, last BM 2/12     Enteral /Parenteral Nutrition: goal 1440cc/day, 24 hr intake 2/12 1440cc (100% estimated needs), 2/11 1240 986% of needs)      Current Weight: 2/12 165, 2/11 162, 2/9 172, doc=sing wt 1/30 165  has 1+ generalized edema    Pertinent Medications: MEDICATIONS  (STANDING):  ALBUTerol/ipratropium for Nebulization 3 milliLiter(s) Nebulizer every 6 hours  aspirin  chewable 81 milliGRAM(s) Oral daily  atorvastatin 40 milliGRAM(s) Oral at bedtime  carvedilol 12.5 milliGRAM(s) Oral every 12 hours  dextrose 5%. 1000 milliLiter(s) (50 mL/Hr) IV Continuous <Continuous>  dextrose 50% Injectable 12.5 Gram(s) IV Push once  dextrose 50% Injectable 25 Gram(s) IV Push once  dextrose 50% Injectable 25 Gram(s) IV Push once  heparin  Injectable 5000 Unit(s) SubCutaneous every 8 hours  hydrALAZINE 25 milliGRAM(s) Oral every 8 hours  hydrocortisone 1% Cream 1 Application(s) Topical daily  insulin lispro (HumaLOG) corrective regimen sliding scale   SubCutaneous every 6 hours  insulin NPH human recombinant 30 Unit(s) SubCutaneous <User Schedule>  nystatin Powder 1 Application(s) Topical two times a day    MEDICATIONS  (PRN):  chlorhexidine 0.12% Liquid 15 milliLiter(s) Swish and Spit every 4 hours PRN mouth hygiene  dextrose Gel 1 Dose(s) Oral once PRN Blood Glucose LESS THAN 70 milliGRAM(s)/deciliter  glucagon  Injectable 1 milliGRAM(s) IntraMuscular once PRN Glucose LESS THAN 70 milligrams/deciliter    Pertinent Labs:  02-11 Na134 mmol/L<L> Glu 338 mg/dL<H> K+ 4.6 mmol/L Cr  3.13 mg/dL<H> BUN 42 mg/dL<H> Phos 5.8 mg/dL<H> Alb 2.3 g/dL<L> PAB n/a     , 272, 185, 99    Skin: no pressure injuries    Estimated Needs:   [x ] no change since previous assessment  [ ] recalculated:       Previous Nutrition Diagnosis: [x ] Increased Protein Needs     Nutrition Diagnosis is [x ] ongoing  [ ] resolved [ ] not applicable   addressed w/ EN       New Nutrition Diagnosis: [x ] not applicable    Recommend    [ ] Change Diet To:    [ ] Nutrition Supplement    [X ] Nutrition Support: Vital 1.2  goal 80cc/hr x 18 hrs to provide 1720 kcals, 108 gm protein, 1168cc free water for 24 kcals/kg, 1.5 gm protein/kg assessment wt of 72.1kg.     [ ] Other:        Monitoring and Evaluation:     [ ] PO intake [x ] Tolerance to diet prescription [ x] weights [x ] follow up per protocol    [ ] other:

## 2018-02-12 NOTE — PROGRESS NOTE ADULT - ASSESSMENT
67F w PMH of bilateral Breast CA (on Trastuzumab, last dose Jan 20th) admitted to MICU with influenza PNA c/b PEA arrest x 2 with ROSC x 2 (10 min + 30 min). CPR c/b bilateral PTX (s/p bilateral chest tubes), pneumoperitoneum (s/p Peritoneal drain (pigtail) in right hemiabdomen and paracentesis decompression) a/w extensive subcutaneous emphysema. Pt received 100 mg of empiric TPA in ED. Surgery consulted and not recommending surgical intervention.   All 3 chest tubes removed on 1/31. Pt had cardiac MRI on 2/1 which was wnl. Right sided IJ removed and right shiley placed and HD initiated on 2/1. Abdominal drain removed on 2/1.  Patients pneumothorax resolved, still with L chest tube. Patient chest tube now clamped.       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   Alert and Oriented x1-2, but lethargic      Skin:  Lines: Right IJ Shiley  Decubiti: None  Erythematous rash- likely contact dermatitis from ECG leads- topical hydrocortisone     GI:  c/w with NG tube feeds    Metabolic:  BMP showing evidence of RUSS 2/2 ATN 2/2 PEA arrest.   still anuric- plan for HD today  renal on case, appreciate recs    Endocrine  FS well controlled ON  - C/w NPH 28 q6h(hold when patient now being fed)+ moderate ISS    Volume Assessment:  no edema    Hematologic:          DVT prophylaxis with HSQ     Infectious Disease:  afebrile without signs of infection    Hemodynamics:  hemodynamically stable off pressors    Cardiovascular:.  Cardiac MRI unremarkable. Shows normal RV and LV function.   c/w Lipitor 40 once patient is no longer NPO  c/w coreg 12.5mg BID, hydralazine 25mg PO q8h    Respiratory:  chest tube clamped today. No lung sliding seen on ultrasound prior to clamping when chest tube on water seal.    - will get chest XRAY this afternoon and consider chest tube removal if no pneumothorax present   c/w matilde

## 2018-02-13 LAB
ALBUMIN SERPL ELPH-MCNC: 2.2 G/DL — LOW (ref 3.3–5)
ALBUMIN SERPL ELPH-MCNC: 2.3 G/DL — LOW (ref 3.3–5)
ALP SERPL-CCNC: 264 U/L — HIGH (ref 40–120)
ALP SERPL-CCNC: 338 U/L — HIGH (ref 40–120)
ALT FLD-CCNC: 63 U/L RC — HIGH (ref 10–45)
ALT FLD-CCNC: 65 U/L RC — HIGH (ref 10–45)
ANION GAP SERPL CALC-SCNC: 10 MMOL/L — SIGNIFICANT CHANGE UP (ref 5–17)
ANION GAP SERPL CALC-SCNC: 9 MMOL/L — SIGNIFICANT CHANGE UP (ref 5–17)
APTT BLD: 27.1 SEC — LOW (ref 27.5–37.4)
APTT BLD: 30.9 SEC — SIGNIFICANT CHANGE UP (ref 27.5–37.4)
AST SERPL-CCNC: 36 U/L — SIGNIFICANT CHANGE UP (ref 10–40)
AST SERPL-CCNC: 53 U/L — HIGH (ref 10–40)
BILIRUB SERPL-MCNC: 0.5 MG/DL — SIGNIFICANT CHANGE UP (ref 0.2–1.2)
BILIRUB SERPL-MCNC: 0.5 MG/DL — SIGNIFICANT CHANGE UP (ref 0.2–1.2)
BUN SERPL-MCNC: 24 MG/DL — HIGH (ref 7–23)
BUN SERPL-MCNC: 27 MG/DL — HIGH (ref 7–23)
CALCIUM SERPL-MCNC: 8.6 MG/DL — SIGNIFICANT CHANGE UP (ref 8.4–10.5)
CALCIUM SERPL-MCNC: 8.9 MG/DL — SIGNIFICANT CHANGE UP (ref 8.4–10.5)
CHLORIDE SERPL-SCNC: 95 MMOL/L — LOW (ref 96–108)
CHLORIDE SERPL-SCNC: 98 MMOL/L — SIGNIFICANT CHANGE UP (ref 96–108)
CO2 SERPL-SCNC: 30 MMOL/L — SIGNIFICANT CHANGE UP (ref 22–31)
CO2 SERPL-SCNC: 31 MMOL/L — SIGNIFICANT CHANGE UP (ref 22–31)
CREAT SERPL-MCNC: 2.04 MG/DL — HIGH (ref 0.5–1.3)
CREAT SERPL-MCNC: 2.33 MG/DL — HIGH (ref 0.5–1.3)
GAS PNL BLDA: SIGNIFICANT CHANGE UP
GLUCOSE BLDC GLUCOMTR-MCNC: 141 MG/DL — HIGH (ref 70–99)
GLUCOSE BLDC GLUCOMTR-MCNC: 183 MG/DL — HIGH (ref 70–99)
GLUCOSE BLDC GLUCOMTR-MCNC: 94 MG/DL — SIGNIFICANT CHANGE UP (ref 70–99)
GLUCOSE SERPL-MCNC: 179 MG/DL — HIGH (ref 70–99)
GLUCOSE SERPL-MCNC: 207 MG/DL — HIGH (ref 70–99)
HCT VFR BLD CALC: 23.3 % — LOW (ref 34.5–45)
HCT VFR BLD CALC: 24.7 % — LOW (ref 34.5–45)
HGB BLD-MCNC: 7.7 G/DL — LOW (ref 11.5–15.5)
HGB BLD-MCNC: 8.1 G/DL — LOW (ref 11.5–15.5)
INR BLD: 0.93 RATIO — SIGNIFICANT CHANGE UP (ref 0.88–1.16)
INR BLD: 0.93 RATIO — SIGNIFICANT CHANGE UP (ref 0.88–1.16)
MAGNESIUM SERPL-MCNC: 2.1 MG/DL — SIGNIFICANT CHANGE UP (ref 1.6–2.6)
MAGNESIUM SERPL-MCNC: 2.4 MG/DL — SIGNIFICANT CHANGE UP (ref 1.6–2.6)
MCHC RBC-ENTMCNC: 30.8 PG — SIGNIFICANT CHANGE UP (ref 27–34)
MCHC RBC-ENTMCNC: 31.1 PG — SIGNIFICANT CHANGE UP (ref 27–34)
MCHC RBC-ENTMCNC: 32.8 GM/DL — SIGNIFICANT CHANGE UP (ref 32–36)
MCHC RBC-ENTMCNC: 33.1 GM/DL — SIGNIFICANT CHANGE UP (ref 32–36)
MCV RBC AUTO: 93.2 FL — SIGNIFICANT CHANGE UP (ref 80–100)
MCV RBC AUTO: 94.6 FL — SIGNIFICANT CHANGE UP (ref 80–100)
PHOSPHATE SERPL-MCNC: 4.6 MG/DL — HIGH (ref 2.5–4.5)
PHOSPHATE SERPL-MCNC: 5.6 MG/DL — HIGH (ref 2.5–4.5)
PLATELET # BLD AUTO: 281 K/UL — SIGNIFICANT CHANGE UP (ref 150–400)
PLATELET # BLD AUTO: 283 K/UL — SIGNIFICANT CHANGE UP (ref 150–400)
POTASSIUM SERPL-MCNC: 4.2 MMOL/L — SIGNIFICANT CHANGE UP (ref 3.5–5.3)
POTASSIUM SERPL-MCNC: 4.6 MMOL/L — SIGNIFICANT CHANGE UP (ref 3.5–5.3)
POTASSIUM SERPL-SCNC: 4.2 MMOL/L — SIGNIFICANT CHANGE UP (ref 3.5–5.3)
POTASSIUM SERPL-SCNC: 4.6 MMOL/L — SIGNIFICANT CHANGE UP (ref 3.5–5.3)
PROT SERPL-MCNC: 6.4 G/DL — SIGNIFICANT CHANGE UP (ref 6–8.3)
PROT SERPL-MCNC: 6.9 G/DL — SIGNIFICANT CHANGE UP (ref 6–8.3)
PROTHROM AB SERPL-ACNC: 10 SEC — SIGNIFICANT CHANGE UP (ref 9.8–12.7)
PROTHROM AB SERPL-ACNC: 10.1 SEC — SIGNIFICANT CHANGE UP (ref 9.8–12.7)
RBC # BLD: 2.5 M/UL — LOW (ref 3.8–5.2)
RBC # BLD: 2.61 M/UL — LOW (ref 3.8–5.2)
RBC # FLD: 15.2 % — HIGH (ref 10.3–14.5)
RBC # FLD: 15.5 % — HIGH (ref 10.3–14.5)
SODIUM SERPL-SCNC: 135 MMOL/L — SIGNIFICANT CHANGE UP (ref 135–145)
SODIUM SERPL-SCNC: 138 MMOL/L — SIGNIFICANT CHANGE UP (ref 135–145)
WBC # BLD: 5.9 K/UL — SIGNIFICANT CHANGE UP (ref 3.8–10.5)
WBC # BLD: 6.1 K/UL — SIGNIFICANT CHANGE UP (ref 3.8–10.5)
WBC # FLD AUTO: 5.9 K/UL — SIGNIFICANT CHANGE UP (ref 3.8–10.5)
WBC # FLD AUTO: 6.1 K/UL — SIGNIFICANT CHANGE UP (ref 3.8–10.5)

## 2018-02-13 PROCEDURE — 71045 X-RAY EXAM CHEST 1 VIEW: CPT | Mod: 26

## 2018-02-13 PROCEDURE — 93308 TTE F-UP OR LMTD: CPT | Mod: 26,GC

## 2018-02-13 PROCEDURE — 76604 US EXAM CHEST: CPT | Mod: 26,GC

## 2018-02-13 PROCEDURE — 71045 X-RAY EXAM CHEST 1 VIEW: CPT | Mod: 26,77

## 2018-02-13 PROCEDURE — 99291 CRITICAL CARE FIRST HOUR: CPT | Mod: 25

## 2018-02-13 PROCEDURE — 99232 SBSQ HOSP IP/OBS MODERATE 35: CPT

## 2018-02-13 PROCEDURE — 74220 X-RAY XM ESOPHAGUS 1CNTRST: CPT | Mod: 26

## 2018-02-13 RX ORDER — SODIUM CHLORIDE 9 MG/ML
1000 INJECTION, SOLUTION INTRAVENOUS
Qty: 0 | Refills: 0 | Status: DISCONTINUED | OUTPATIENT
Start: 2018-02-13 | End: 2018-02-21

## 2018-02-13 RX ORDER — DEXTROSE 50 % IN WATER 50 %
12.5 SYRINGE (ML) INTRAVENOUS ONCE
Qty: 0 | Refills: 0 | Status: DISCONTINUED | OUTPATIENT
Start: 2018-02-13 | End: 2018-02-21

## 2018-02-13 RX ORDER — DEXTROSE 50 % IN WATER 50 %
25 SYRINGE (ML) INTRAVENOUS ONCE
Qty: 0 | Refills: 0 | Status: DISCONTINUED | OUTPATIENT
Start: 2018-02-13 | End: 2018-02-21

## 2018-02-13 RX ORDER — DEXTROSE 50 % IN WATER 50 %
1 SYRINGE (ML) INTRAVENOUS ONCE
Qty: 0 | Refills: 0 | Status: DISCONTINUED | OUTPATIENT
Start: 2018-02-13 | End: 2018-02-21

## 2018-02-13 RX ORDER — HUMAN INSULIN 100 [IU]/ML
10 INJECTION, SUSPENSION SUBCUTANEOUS
Qty: 0 | Refills: 0 | Status: DISCONTINUED | OUTPATIENT
Start: 2018-02-13 | End: 2018-02-14

## 2018-02-13 RX ORDER — GLUCAGON INJECTION, SOLUTION 0.5 MG/.1ML
1 INJECTION, SOLUTION SUBCUTANEOUS ONCE
Qty: 0 | Refills: 0 | Status: DISCONTINUED | OUTPATIENT
Start: 2018-02-13 | End: 2018-02-21

## 2018-02-13 RX ORDER — INSULIN LISPRO 100/ML
VIAL (ML) SUBCUTANEOUS EVERY 6 HOURS
Qty: 0 | Refills: 0 | Status: DISCONTINUED | OUTPATIENT
Start: 2018-02-13 | End: 2018-02-13

## 2018-02-13 RX ADMIN — Medication 3 MILLILITER(S): at 05:27

## 2018-02-13 RX ADMIN — HEPARIN SODIUM 5000 UNIT(S): 5000 INJECTION INTRAVENOUS; SUBCUTANEOUS at 16:53

## 2018-02-13 RX ADMIN — Medication 3 MILLILITER(S): at 11:44

## 2018-02-13 RX ADMIN — Medication 3 MILLILITER(S): at 17:05

## 2018-02-13 RX ADMIN — Medication 81 MILLIGRAM(S): at 11:52

## 2018-02-13 RX ADMIN — Medication 4: at 00:14

## 2018-02-13 RX ADMIN — Medication 1 APPLICATION(S): at 11:51

## 2018-02-13 RX ADMIN — HUMAN INSULIN 30 UNIT(S): 100 INJECTION, SUSPENSION SUBCUTANEOUS at 00:15

## 2018-02-13 RX ADMIN — Medication 2: at 05:26

## 2018-02-13 RX ADMIN — CARVEDILOL PHOSPHATE 12.5 MILLIGRAM(S): 80 CAPSULE, EXTENDED RELEASE ORAL at 17:05

## 2018-02-13 RX ADMIN — NYSTATIN CREAM 1 APPLICATION(S): 100000 CREAM TOPICAL at 17:05

## 2018-02-13 RX ADMIN — NYSTATIN CREAM 1 APPLICATION(S): 100000 CREAM TOPICAL at 05:04

## 2018-02-13 RX ADMIN — HUMAN INSULIN 10 UNIT(S): 100 INJECTION, SUSPENSION SUBCUTANEOUS at 17:18

## 2018-02-13 RX ADMIN — Medication 3 MILLILITER(S): at 23:27

## 2018-02-13 RX ADMIN — HEPARIN SODIUM 5000 UNIT(S): 5000 INJECTION INTRAVENOUS; SUBCUTANEOUS at 07:50

## 2018-02-13 RX ADMIN — ATORVASTATIN CALCIUM 40 MILLIGRAM(S): 80 TABLET, FILM COATED ORAL at 21:13

## 2018-02-13 RX ADMIN — HEPARIN SODIUM 5000 UNIT(S): 5000 INJECTION INTRAVENOUS; SUBCUTANEOUS at 00:16

## 2018-02-13 RX ADMIN — Medication 25 MILLIGRAM(S): at 21:13

## 2018-02-13 NOTE — PROGRESS NOTE ADULT - PROBLEM SELECTOR PLAN 1
likely from ATN in setting of cardiac arrest. Pt continues to be oligo anuric.  s/p HD with 1.8 L UF yesterday  HD with 2-3 L UF today for worsening dyspnea and fluid overload.

## 2018-02-13 NOTE — PROGRESS NOTE ADULT - SUBJECTIVE AND OBJECTIVE BOX
Chief Complaint/Follow-up on: T2DM    Subjective: Patient with R-sided weakness due to CVA. Patient's daughter at the bedside and she was asking for referrals for a rehab facility for her mom. Ms. Barraza was able to recognize me and identified me as "Regine". She is awaiting a repeat speech and swallow.     MEDICATIONS  (STANDING):  ALBUTerol/ipratropium for Nebulization 3 milliLiter(s) Nebulizer every 6 hours  aspirin  chewable 81 milliGRAM(s) Oral daily  atorvastatin 40 milliGRAM(s) Oral at bedtime  carvedilol 12.5 milliGRAM(s) Oral every 12 hours  dextrose 5%. 1000 milliLiter(s) (50 mL/Hr) IV Continuous <Continuous>  dextrose 50% Injectable 12.5 Gram(s) IV Push once  dextrose 50% Injectable 25 Gram(s) IV Push once  dextrose 50% Injectable 25 Gram(s) IV Push once  heparin  Injectable 5000 Unit(s) SubCutaneous every 8 hours  hydrALAZINE 25 milliGRAM(s) Oral every 8 hours  hydrocortisone 1% Cream 1 Application(s) Topical daily  insulin lispro (HumaLOG) corrective regimen sliding scale   SubCutaneous every 6 hours  nystatin Powder 1 Application(s) Topical two times a day    MEDICATIONS  (PRN):  chlorhexidine 0.12% Liquid 15 milliLiter(s) Swish and Spit every 4 hours PRN mouth hygiene  dextrose Gel 1 Dose(s) Oral once PRN Blood Glucose LESS THAN 70 milliGRAM(s)/deciliter  glucagon  Injectable 1 milliGRAM(s) IntraMuscular once PRN Glucose LESS THAN 70 milligrams/deciliter      PHYSICAL EXAM:  VITALS: T(C): 36.6 (02-13-18 @ 12:00)  T(F): 97.9 (02-13-18 @ 12:00), Max: 98.7 (02-13-18 @ 04:00)  HR: 92 (02-13-18 @ 15:00) (76 - 96)  BP: 161/70 (02-13-18 @ 15:00) (87/47 - 161/70)  RR:  (13 - 36)  SpO2:  (95% - 100%)  Wt(kg): --  GENERAL: NAD, well-groomed, well-developed  HEENT:  Atraumatic, Normocephalic, moist mucous membranes  RESPIRATORY: Coarse bs b/l  CARDIOVASCULAR: Regular rate and rhythm; No murmurs  GI: Soft, nontender, non distended, normal bowel sounds      POCT Blood Glucose.: 94 mg/dL (02-13-18 @ 11:49)  POCT Blood Glucose.: 183 mg/dL (02-13-18 @ 05:02)  POCT Blood Glucose.: 131 mg/dL (02-12-18 @ 17:15)  POCT Blood Glucose.: 118 mg/dL (02-12-18 @ 11:45)  POCT Blood Glucose.: 272 mg/dL (02-12-18 @ 05:09)  POCT Blood Glucose.: 185 mg/dL (02-11-18 @ 19:03)  POCT Blood Glucose.: 99 mg/dL (02-11-18 @ 12:21)  POCT Blood Glucose.: 279 mg/dL (02-11-18 @ 05:08)  POCT Blood Glucose.: 314 mg/dL (02-10-18 @ 23:01)  POCT Blood Glucose.: 162 mg/dL (02-10-18 @ 17:02)    02-12    135  |  95<L>  |  27<H>  ----------------------------<  207<H>  4.2   |  31  |  2.33<H>    EGFR if : 24<L>  EGFR if non : 21<L>    Ca    8.6      02-12  Mg     2.1     02-12  Phos  4.6     02-12    TPro  6.4  /  Alb  2.2<L>  /  TBili  0.5  /  DBili  x   /  AST  36  /  ALT  63<H>  /  AlkPhos  264<H>  02-12      Hemoglobin A1C, Whole Blood: 8.6 % <H> [4.0 - 5.6] (01-31-18 @ 07:15)  Hemoglobin A1C, Whole Blood: 8.7 % <H> [4.0 - 5.6] (01-31-18 @ 05:51)

## 2018-02-13 NOTE — SWALLOW BEDSIDE ASSESSMENT ADULT - SWALLOW EVAL: DIAGNOSIS
Chart reviewed and case d/w NP: Ellis. Swallow evaluation to be deferred pending completion of esophagram to assess for esophageal tears given h/o esophageal intubation. Service will f/u as clinically appropriate.

## 2018-02-13 NOTE — CHART NOTE - NSCHARTNOTEFT_GEN_A_CORE
: Dr. Diana Ba    INDICATION: SOB, PTX f/u    PROCEDURE:  [x] LIMITED ECHO  [x ] LIMITED CHEST  [ ] LIMITED RETROPERITONEAL  [ ] LIMITED ABDOMINAL  [ ] LIMITED DVT  [ ] NEEDLE GUIDANCE VASCULAR  [ ] NEEDLE GUIDANCE THORACENTESIS  [ ] NEEDLE GUIDANCE PARACENTESIS  [ ] NEEDLE GUIDANCE PERICARDIOCENTESIS  [ ] OTHER    FINDINGS: left-sided B-lines anteriorly, no lung-sliding appreciated in right lung. PSLA - mild LVH, trace pericardial effusion, no LA dilatation, EF grossly preserved. PSSA - EF grossly preserved, no segmental wall abnormalities. Apical and subcostal views not obtainable.    INTERPRETATION: B-lines consistent with volume-overloaded state, lack of lung sliding on the right side consistent with previously diagnosed PTX. Limited echo revealed grossly normal EF, trace pericardial effusion, mild LVH and no segmental wall abnormalities. : Dr. Diana Ba    INDICATION: SOB with concern for volume overload, also with chest tube in place for PTX    PROCEDURE:  [x] LIMITED ECHO  [x ] LIMITED CHEST  [ ] LIMITED RETROPERITONEAL  [ ] LIMITED ABDOMINAL  [ ] LIMITED DVT  [ ] NEEDLE GUIDANCE VASCULAR  [ ] NEEDLE GUIDANCE THORACENTESIS  [ ] NEEDLE GUIDANCE PARACENTESIS  [ ] NEEDLE GUIDANCE PERICARDIOCENTESIS  [ ] OTHER    FINDINGS: bilateral anterior B lines more notable on the left, no lung-sliding, or B-lines, appreciated in right lung apex. PSLA - mild LVH, trace pericardial effusion, no LA dilatation, EF grossly preserved. PSSA - EF grossly preserved, no segmental wall abnormalities. Apical view was not obtainable.    INTERPRETATION: B-lines consistent with volume-overloaded state, lack of lung sliding but no lung point to suggest PTX, may be due to chronic lung changes. No lung sliding was seen when chest tube was on suction and lung was inflated. Limited echo revealed grossly normal EF, trace pericardial effusion, mild LVH and no segmental wall abnormalities.

## 2018-02-13 NOTE — PROGRESS NOTE ADULT - ASSESSMENT
67F w PMH of bilateral Breast CA (on Trastuzumab, last dose Jan 20th) admitted to MICU with influenza PNA c/b PEA arrest x 2 with ROSC x 2 (10 min + 30 min). CPR c/b bilateral PTX (s/p bilateral chest tubes), pneumoperitoneum (s/p Peritoneal drain (pigtail) in right hemiabdomen and paracentesis decompression) a/w extensive subcutaneous emphysema. Pt received 100 mg of empiric TPA in ED. Surgery consulted and not recommending surgical intervention.   All 3 chest tubes removed on 1/31. Pt had cardiac MRI on 2/1 which was wnl. Right sided IJ removed and right shiley placed and HD initiated on 2/1. Abdominal drain removed on 2/1.  Patients pneumothorax resolved, still with L chest tube. Patient chest tube now clamped.       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   Alert and Oriented x1-2, but lethargic      Skin:  Lines: Right IJ Shiley  Decubiti: None  Erythematous rash- likely contact dermatitis from ECG leads- topical hydrocortisone     GI:  c/w with NG tube feeds    Metabolic:  BMP showing evidence of RUSS 2/2 ATN 2/2 PEA arrest.   still anuric- plan for HD today  renal on case, appreciate recs    Endocrine  FS well controlled ON  - C/w NPH 28 q6h(hold when patient now being fed)+ moderate ISS    Volume Assessment:  no edema    Hematologic:          DVT prophylaxis with HSQ     Infectious Disease:  afebrile without signs of infection    Hemodynamics:  hemodynamically stable off pressors    Cardiovascular:.  Cardiac MRI unremarkable. Shows normal RV and LV function.   c/w Lipitor 40 once patient is no longer NPO  c/w coreg 12.5mg BID, hydralazine 25mg PO q8h    Respiratory:  chest tube clamped today. No lung sliding seen on ultrasound prior to clamping when chest tube on water seal.    - will get chest XRAY this afternoon and consider chest tube removal if no pneumothorax present   c/w matilde 67F w PMH of bilateral Breast CA (on Trastuzumab, last dose Jan 20th) admitted to MICU with influenza PNA c/b PEA arrest x 2 with ROSC x 2 (10 min + 30 min). CPR c/b bilateral PTX (s/p bilateral chest tubes), pneumoperitoneum (s/p Peritoneal drain (pigtail) in right hemiabdomen and paracentesis decompression) a/w extensive subcutaneous emphysema. Pt received 100 mg of empiric TPA in ED. Surgery consulted and not recommending surgical intervention.   All 3 chest tubes removed on 1/31. Pt had cardiac MRI on 2/1 which was wnl. Right sided IJ removed and right shiley placed and HD initiated on 2/1. Abdominal drain removed on 2/1.  Patients pneumothorax resolved, still with L chest tube. Patient chest tube now clamped.       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   Alert and Oriented x1-2, but lethargic      Skin:  Lines: Right IJ Shiley  Decubiti: None  Erythematous rash- likely contact dermatitis from ECG leads- topical hydrocortisone     GI:  c/w with NG tube feeds    Metabolic:  BMP showing evidence of RUSS 2/2 ATN 2/2 PEA arrest.   still anuric- plan for HD today  renal on case, appreciate recs    Endocrine  FS well controlled ON  - C/w NPH 28 q6h(hold when patient now being fed)+ moderate ISS    Volume Assessment:  overloaded w/ crackles bilaterally    Hematologic:          DVT prophylaxis with HSQ     Infectious Disease:  afebrile without signs of infection    Hemodynamics:  hemodynamically stable off pressors    Cardiovascular:.  Cardiac MRI unremarkable. Shows normal RV and LV function.   c/w Lipitor 40 once patient is no longer NPO  c/w coreg 12.5mg BID, hydralazine 25mg PO q8h    Respiratory:  urgent HD today for shortness of breath with B lines on US   c/w matilde 67F w PMH of bilateral Breast CA (on Trastuzumab, last dose Jan 20th) admitted to MICU with influenza PNA c/b PEA arrest x 2 with ROSC x 2 (10 min + 30 min). CPR c/b bilateral PTX (s/p bilateral chest tubes), pneumoperitoneum (s/p Peritoneal drain (pigtail) in right hemiabdomen and paracentesis decompression) a/w extensive subcutaneous emphysema. Pt received 100 mg of empiric TPA in ED. Surgery consulted and not recommending surgical intervention.   All 3 chest tubes removed on 1/31. Pt had cardiac MRI on 2/1 which was wnl. Right sided IJ removed and right shiley placed and HD initiated on 2/1. Abdominal drain removed on 2/1.  Patients pneumothorax resolved, still with L chest tube. Patient chest tube now clamped.     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   Alert and Oriented x1-2      Skin:  Lines: Right IJ Shiley  Tubes: L chest tube   Decubiti: None  Erythematous rash- likely contact dermatitis from ECG leads- topical hydrocortisone     GI:  c/w with NG tube feeds  Plan for esophagram today to r/o esophageal leak as had possible esophageal intubation    Metabolic:  RUSS 2/2 ATN 2/2 PEA arrest  urgent HD today for fluid overload - f/u with renal re: fluid removal, plan for 2L removal today if bp tolerates. goal to remain net negative.    Endocrine  FS well controlled ON  - C/w NPH 28 q6h(hold when patient now being fed)+ moderate ISS    Volume Assessment:  overloaded w/ crackles bilaterally     DVT prophylaxis with HSQ     Infectious Disease:  afebrile without signs of infection    Hemodynamics:  hemodynamically stable off pressors    Cardiovascular:.  Cardiac MRI unremarkable. Shows normal RV and LV function.   c/w coreg 12.5mg BID, hydralazine 25mg PO q8h    Respiratory:  urgent HD today for shortness of breath with B lines on US   c/w duonebs   - plan to remove chest tube today after esophagram 67F w PMH of bilateral Breast CA (on Trastuzumab, last dose Jan 20th) admitted to MICU with influenza PNA c/b PEA arrest x 2 with ROSC x 2 (10 min + 30 min). CPR c/b bilateral PTX (s/p bilateral chest tubes), pneumoperitoneum (s/p Peritoneal drain (pigtail) in right hemiabdomen and paracentesis decompression) a/w extensive subcutaneous emphysema. Pt received 100 mg of empiric TPA in ED. Surgery consulted and not recommending surgical intervention.   All 3 chest tubes removed on 1/31. Pt had cardiac MRI on 2/1 which was wnl. Right sided IJ removed and right shiley placed and HD initiated on 2/1. Abdominal drain removed on 2/1.  Patients pneumothorax resolved, still with L chest tube. Patient chest tube now clamped.     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   Alert and Oriented x1-2      Skin:  Lines: Right IJ Shiley  Tubes: L chest tube   Decubiti: None  Erythematous rash- likely contact dermatitis from ECG leads- topical hydrocortisone     GI:  c/w with NG tube feeds  Plan for esophagram today to r/o esophageal leak as had possible esophageal intubation    Metabolic:  RUSS 2/2 ATN 2/2 PEA arrest  urgent HD today for fluid overload - f/u with renal re: fluid removal, plan for 2-3L removal today if bp tolerates. goal to remain net negative, plan for ultrafiltration likely tomorrow per renal.  Will need permacath.      Endocrine  FS well controlled ON  - C/w NPH 28 q6h(hold when patient now being fed)+ moderate ISS    Volume Assessment:  overloaded w/ crackles bilaterally     DVT prophylaxis with HSQ     Infectious Disease:  afebrile without signs of infection    Hemodynamics:  hemodynamically stable off pressors    Cardiovascular:.  Cardiac MRI unremarkable. Shows normal RV and LV function.   c/w coreg 12.5mg BID, hydralazine 25mg PO q8h    Respiratory:  urgent HD today for shortness of breath with B lines on US   c/w duonebs   - plan to remove chest tube today after esophagram

## 2018-02-13 NOTE — PROGRESS NOTE ADULT - SUBJECTIVE AND OBJECTIVE BOX
[facility] DIVISION OF KIDNEY DISEASES AND HYPERTENSION -- FOLLOW UP NOTE  --------------------------------------------------------------------------------  Chief Complaint:    24 hour events/subjective:        PAST HISTORY  --------------------------------------------------------------------------------  No significant changes to PMH, PSH, FHx, SHx, unless otherwise noted    ALLERGIES & MEDICATIONS  --------------------------------------------------------------------------------  Allergies    NIFEdipine (Urticaria; Hives)  vitamin E (Short breath; Urticaria; Hives)    Intolerances      Standing Inpatient Medications  ALBUTerol/ipratropium for Nebulization 3 milliLiter(s) Nebulizer every 6 hours  aspirin  chewable 81 milliGRAM(s) Oral daily  atorvastatin 40 milliGRAM(s) Oral at bedtime  carvedilol 12.5 milliGRAM(s) Oral every 12 hours  dextrose 5%. 1000 milliLiter(s) IV Continuous <Continuous>  dextrose 50% Injectable 12.5 Gram(s) IV Push once  dextrose 50% Injectable 25 Gram(s) IV Push once  dextrose 50% Injectable 25 Gram(s) IV Push once  heparin  Injectable 5000 Unit(s) SubCutaneous every 8 hours  hydrALAZINE 25 milliGRAM(s) Oral every 8 hours  hydrocortisone 1% Cream 1 Application(s) Topical daily  insulin lispro (HumaLOG) corrective regimen sliding scale   SubCutaneous every 6 hours  insulin NPH human recombinant 30 Unit(s) SubCutaneous <User Schedule>  nystatin Powder 1 Application(s) Topical two times a day    PRN Inpatient Medications  chlorhexidine 0.12% Liquid 15 milliLiter(s) Swish and Spit every 4 hours PRN  dextrose Gel 1 Dose(s) Oral once PRN  glucagon  Injectable 1 milliGRAM(s) IntraMuscular once PRN      REVIEW OF SYSTEMS  --------------------------------------------------------------------------------  Gen: No weight changes, fatigue, fevers/chills, weakness  Skin: No rashes  Head/Eyes/Ears/Mouth: No headache; Normal hearing; Normal vision w/o blurriness; No sinus pain/discomfort, sore throat  Respiratory: No dyspnea, cough, wheezing, hemoptysis  CV: No chest pain, PND, orthopnea  GI: No abdominal pain, diarrhea, constipation, nausea, vomiting, melena, hematochezia  : No increased frequency, dysuria, hematuria, nocturia  MSK: No joint pain/swelling; no back pain; no edema  Neuro: No dizziness/lightheadedness, weakness, seizures, numbness, tingling  Heme: No easy bruising or bleeding  Endo: No heat/cold intolerance  Psych: No significant nervousness, anxiety, stress, depression    All other systems were reviewed and are negative, except as noted.    VITALS/PHYSICAL EXAM  --------------------------------------------------------------------------------  T(C): 36.6 (02-13-18 @ 07:00), Max: 37.1 (02-13-18 @ 04:00)  HR: 89 (02-13-18 @ 08:00) (75 - 96)  BP: 123/61 (02-13-18 @ 08:00) (109/50 - 163/67)  RR: 17 (02-13-18 @ 08:00) (14 - 36)  SpO2: 100% (02-13-18 @ 08:00) (95% - 100%)  Wt(kg): --        02-12-18 @ 07:01  -  02-13-18 @ 07:00  --------------------------------------------------------  IN: 2390 mL / OUT: 1800 mL / NET: 590 mL      Physical Exam:  	Gen: NAD, well-appearing  	HEENT: PERRL, supple neck, clear oropharynx  	Pulm: CTA B/L  	CV: RRR, S1S2; no rub  	Back: No spinal or CVA tenderness; no sacral edema  	Abd: +BS, soft, nontender/nondistended  	: No suprapubic tenderness  	UE: Warm, FROM, no clubbing, intact strength; no edema; no asterixis  	LE: Warm, FROM, no clubbing, intact strength; no edema  	Neuro: No focal deficits, intact gait  	Psych: Normal affect and mood  	Skin: Warm, without rashes  	Vascular access:    LABS/STUDIES  --------------------------------------------------------------------------------              7.7    5.9   >-----------<  281      [02-12-18 @ 23:42]              23.3     135  |  95  |  27  ----------------------------<  207      [02-12-18 @ 23:42]  4.2   |  31  |  2.33        Ca     8.6     [02-12-18 @ 23:42]      Mg     2.1     [02-12-18 @ 23:42]      Phos  4.6     [02-12-18 @ 23:42]    TPro  6.4  /  Alb  2.2  /  TBili  0.5  /  DBili  x   /  AST  36  /  ALT  63  /  AlkPhos  264  [02-12-18 @ 23:42]    PT/INR: PT 10.1 , INR 0.93       [02-12-18 @ 23:42]  PTT: 27.1       [02-12-18 @ 23:42]      Creatinine Trend:  SCr 2.33 [02-12 @ 23:42]  SCr 3.13 [02-11 @ 23:23]  SCr 3.81 [02-10 @ 23:33]  SCr 2.51 [02-09 @ 23:24]  SCr 3.59 [02-09 @ 00:00]    Urinalysis - [01-30-18 @ 12:52]      Color Yellow / Appearance SL Turbid / SG 1.025 / pH 6.0      Gluc 50 / Ketone Negative  / Bili Negative / Urobili Negative       Blood Trace / Protein 150 / Leuk Est Negative / Nitrite Negative      RBC 3-5 / WBC  / Hyaline  / Gran  / Sq Epi  / Non Sq Epi OCC / Bacteria       HbA1c 8.6      [01-31-18 @ 07:15]  Lipid: chol 183, , HDL 10,       [02-07-18 @ 13:34]    HBsAg Nonreact      [02-01-18 @ 22:49]  HCV 0.16, Nonreact      [02-01-18 @ 22:49] [facility] DIVISION OF KIDNEY DISEASES AND HYPERTENSION -- FOLLOW UP NOTE  --------------------------------------------------------------------------------  Chief Complaint: RUSS, cardiac arrest    24 hour events/subjective:        PAST HISTORY  --------------------------------------------------------------------------------  No significant changes to PMH, PSH, FHx, SHx, unless otherwise noted    ALLERGIES & MEDICATIONS  --------------------------------------------------------------------------------  Allergies    NIFEdipine (Urticaria; Hives)  vitamin E (Short breath; Urticaria; Hives)    Intolerances      Standing Inpatient Medications  ALBUTerol/ipratropium for Nebulization 3 milliLiter(s) Nebulizer every 6 hours  aspirin  chewable 81 milliGRAM(s) Oral daily  atorvastatin 40 milliGRAM(s) Oral at bedtime  carvedilol 12.5 milliGRAM(s) Oral every 12 hours  dextrose 5%. 1000 milliLiter(s) IV Continuous <Continuous>  dextrose 50% Injectable 12.5 Gram(s) IV Push once  dextrose 50% Injectable 25 Gram(s) IV Push once  dextrose 50% Injectable 25 Gram(s) IV Push once  heparin  Injectable 5000 Unit(s) SubCutaneous every 8 hours  hydrALAZINE 25 milliGRAM(s) Oral every 8 hours  hydrocortisone 1% Cream 1 Application(s) Topical daily  insulin lispro (HumaLOG) corrective regimen sliding scale   SubCutaneous every 6 hours  insulin NPH human recombinant 30 Unit(s) SubCutaneous <User Schedule>  nystatin Powder 1 Application(s) Topical two times a day    PRN Inpatient Medications  chlorhexidine 0.12% Liquid 15 milliLiter(s) Swish and Spit every 4 hours PRN  dextrose Gel 1 Dose(s) Oral once PRN  glucagon  Injectable 1 milliGRAM(s) IntraMuscular once PRN      REVIEW OF SYSTEMS  --------------------------------------------------------------------------------  Gen: No weight changes, fatigue, fevers/chills, weakness  Skin: No rashes  Head/Eyes/Ears/Mouth: No headache; Normal hearing; Normal vision w/o blurriness; No sinus pain/discomfort, sore throat  Respiratory: No dyspnea, cough, wheezing, hemoptysis  CV: No chest pain, PND, orthopnea  GI: No abdominal pain, diarrhea, constipation, nausea, vomiting, melena, hematochezia  : No increased frequency, dysuria, hematuria, nocturia  MSK: No joint pain/swelling; no back pain; no edema  Neuro: No dizziness/lightheadedness, weakness, seizures, numbness, tingling  Heme: No easy bruising or bleeding  Endo: No heat/cold intolerance  Psych: No significant nervousness, anxiety, stress, depression    All other systems were reviewed and are negative, except as noted.    VITALS/PHYSICAL EXAM  --------------------------------------------------------------------------------  T(C): 36.6 (02-13-18 @ 07:00), Max: 37.1 (02-13-18 @ 04:00)  HR: 89 (02-13-18 @ 08:00) (75 - 96)  BP: 123/61 (02-13-18 @ 08:00) (109/50 - 163/67)  RR: 17 (02-13-18 @ 08:00) (14 - 36)  SpO2: 100% (02-13-18 @ 08:00) (95% - 100%)  Wt(kg): --        02-12-18 @ 07:01  -  02-13-18 @ 07:00  --------------------------------------------------------  IN: 2390 mL / OUT: 1800 mL / NET: 590 mL      Physical Exam:  	Gen: NAD, well-appearing  	HEENT: PERRL, supple neck, clear oropharynx  	Pulm: CTA B/L  	CV: RRR, S1S2; no rub  	Back: No spinal or CVA tenderness; no sacral edema  	Abd: +BS, soft, nontender/nondistended  	: No suprapubic tenderness  	UE: Warm, FROM, no clubbing, intact strength; no edema; no asterixis  	LE: Warm, FROM, no clubbing, intact strength; no edema  	Neuro: No focal deficits, intact gait  	Psych: Normal affect and mood  	Skin: Warm, without rashes  	Vascular access:    LABS/STUDIES  --------------------------------------------------------------------------------              7.7    5.9   >-----------<  281      [02-12-18 @ 23:42]              23.3     135  |  95  |  27  ----------------------------<  207      [02-12-18 @ 23:42]  4.2   |  31  |  2.33        Ca     8.6     [02-12-18 @ 23:42]      Mg     2.1     [02-12-18 @ 23:42]      Phos  4.6     [02-12-18 @ 23:42]    TPro  6.4  /  Alb  2.2  /  TBili  0.5  /  DBili  x   /  AST  36  /  ALT  63  /  AlkPhos  264  [02-12-18 @ 23:42]    PT/INR: PT 10.1 , INR 0.93       [02-12-18 @ 23:42]  PTT: 27.1       [02-12-18 @ 23:42]      Creatinine Trend:  SCr 2.33 [02-12 @ 23:42]  SCr 3.13 [02-11 @ 23:23]  SCr 3.81 [02-10 @ 23:33]  SCr 2.51 [02-09 @ 23:24]  SCr 3.59 [02-09 @ 00:00]    Urinalysis - [01-30-18 @ 12:52]      Color Yellow / Appearance SL Turbid / SG 1.025 / pH 6.0      Gluc 50 / Ketone Negative  / Bili Negative / Urobili Negative       Blood Trace / Protein 150 / Leuk Est Negative / Nitrite Negative      RBC 3-5 / WBC  / Hyaline  / Gran  / Sq Epi  / Non Sq Epi OCC / Bacteria       HbA1c 8.6      [01-31-18 @ 07:15]  Lipid: chol 183, , HDL 10,       [02-07-18 @ 13:34]    HBsAg Nonreact      [02-01-18 @ 22:49]  HCV 0.16, Nonreact      [02-01-18 @ 22:49] [facility] DIVISION OF KIDNEY DISEASES AND HYPERTENSION -- FOLLOW UP NOTE  --------------------------------------------------------------------------------  Chief Complaint: RUSS, cardiac arrest    24 hour events/subjective:  overnight with worsening dyspnea with B lines on POCUS  urgent HD this AM      PAST HISTORY  --------------------------------------------------------------------------------  No significant changes to PMH, PSH, FHx, SHx, unless otherwise noted    ALLERGIES & MEDICATIONS  --------------------------------------------------------------------------------  Allergies    NIFEdipine (Urticaria; Hives)  vitamin E (Short breath; Urticaria; Hives)    Intolerances      Standing Inpatient Medications  ALBUTerol/ipratropium for Nebulization 3 milliLiter(s) Nebulizer every 6 hours  aspirin  chewable 81 milliGRAM(s) Oral daily  atorvastatin 40 milliGRAM(s) Oral at bedtime  carvedilol 12.5 milliGRAM(s) Oral every 12 hours  dextrose 5%. 1000 milliLiter(s) IV Continuous <Continuous>  dextrose 50% Injectable 12.5 Gram(s) IV Push once  dextrose 50% Injectable 25 Gram(s) IV Push once  dextrose 50% Injectable 25 Gram(s) IV Push once  heparin  Injectable 5000 Unit(s) SubCutaneous every 8 hours  hydrALAZINE 25 milliGRAM(s) Oral every 8 hours  hydrocortisone 1% Cream 1 Application(s) Topical daily  insulin lispro (HumaLOG) corrective regimen sliding scale   SubCutaneous every 6 hours  insulin NPH human recombinant 30 Unit(s) SubCutaneous <User Schedule>  nystatin Powder 1 Application(s) Topical two times a day    PRN Inpatient Medications  chlorhexidine 0.12% Liquid 15 milliLiter(s) Swish and Spit every 4 hours PRN  dextrose Gel 1 Dose(s) Oral once PRN  glucagon  Injectable 1 milliGRAM(s) IntraMuscular once PRN      REVIEW OF SYSTEMS  --------------------------------------------------------------------------------  uanble to obtain    VITALS/PHYSICAL EXAM  --------------------------------------------------------------------------------  T(C): 36.6 (02-13-18 @ 07:00), Max: 37.1 (02-13-18 @ 04:00)  HR: 89 (02-13-18 @ 08:00) (75 - 96)  BP: 123/61 (02-13-18 @ 08:00) (109/50 - 163/67)  RR: 17 (02-13-18 @ 08:00) (14 - 36)  SpO2: 100% (02-13-18 @ 08:00) (95% - 100%)  Wt(kg): --        02-12-18 @ 07:01  -  02-13-18 @ 07:00  --------------------------------------------------------  IN: 2390 mL / OUT: 1800 mL / NET: 590 mL      Physical Exam:  	Gen: lethargic              Pulm: cta b/l  	CV: RRR, S1S2; no rub  	Abd: +BS, soft,   	UE: Warm,  no edema; no asterixis  	LE: Warm,  no edema  	Skin: Warm, without rashes  	Vascular access: RIJ non tunneled catheter    LABS/STUDIES  --------------------------------------------------------------------------------              7.7    5.9   >-----------<  281      [02-12-18 @ 23:42]              23.3     135  |  95  |  27  ----------------------------<  207      [02-12-18 @ 23:42]  4.2   |  31  |  2.33        Ca     8.6     [02-12-18 @ 23:42]      Mg     2.1     [02-12-18 @ 23:42]      Phos  4.6     [02-12-18 @ 23:42]    TPro  6.4  /  Alb  2.2  /  TBili  0.5  /  DBili  x   /  AST  36  /  ALT  63  /  AlkPhos  264  [02-12-18 @ 23:42]    PT/INR: PT 10.1 , INR 0.93       [02-12-18 @ 23:42]  PTT: 27.1       [02-12-18 @ 23:42]      Creatinine Trend:  SCr 2.33 [02-12 @ 23:42]  SCr 3.13 [02-11 @ 23:23]  SCr 3.81 [02-10 @ 23:33]  SCr 2.51 [02-09 @ 23:24]  SCr 3.59 [02-09 @ 00:00]    Urinalysis - [01-30-18 @ 12:52]      Color Yellow / Appearance SL Turbid / SG 1.025 / pH 6.0      Gluc 50 / Ketone Negative  / Bili Negative / Urobili Negative       Blood Trace / Protein 150 / Leuk Est Negative / Nitrite Negative      RBC 3-5 / WBC  / Hyaline  / Gran  / Sq Epi  / Non Sq Epi OCC / Bacteria       HbA1c 8.6      [01-31-18 @ 07:15]  Lipid: chol 183, , HDL 10,       [02-07-18 @ 13:34]    HBsAg Nonreact      [02-01-18 @ 22:49]  HCV 0.16, Nonreact      [02-01-18 @ 22:49]

## 2018-02-13 NOTE — PROGRESS NOTE ADULT - ASSESSMENT
66 y/o F with T2DM uncontrolled on insulin, osteoporosis, HTN, here with acute respiratory failure 2/2 influenza s/p PEA arrest x 2, awiting speech and swallow eval s/p normal esophagram.

## 2018-02-13 NOTE — PROGRESS NOTE ADULT - ATTENDING COMMENTS
Agree with above. Patient seen and examined.  -Influenza - s/p treatment now with improvement in respiratory status  -Cardiac arrest - ~35 min in total with ROSC - now no longer in shock  -BIlateral pneumothoraces - now with  a single remaining chest tube in left lung - patient without respiratory distress - will plan to clamp tube and check CXR - hopeful to remove chest tube today  -Acute Renal failure after cardiac arrest - now continue HD but with aim to make net negative - despite HD yesterday she was still net positive and had acute SOB and pulmonary edema overnight requiring HD again today - will aim to take more fluid off with HD today. Lung US continues to show bilateral B lines  -CVA     Critical Care Time 35 minutes

## 2018-02-13 NOTE — PROGRESS NOTE ADULT - PROBLEM SELECTOR PLAN 1
-TF to be restarted this afternoon, so NPH has been held today.   -Check bs q6  -POC q6  -NPH 10 units sq at 6pm, 12am and 12pm as TF are held from 6am-11am.

## 2018-02-13 NOTE — PROGRESS NOTE ADULT - SUBJECTIVE AND OBJECTIVE BOX
CHIEF COMPLAINT:    Interval Events:    REVIEW OF SYSTEMS:  Constitutional: [ ] negative [ ] fevers [ ] chills [ ] weight loss [ ] weight gain  HEENT: [ ] negative [ ] dry eyes [ ] eye irritation [ ] postnasal drip [ ] nasal congestion  CV: [ ] negative  [ ] chest pain [ ] orthopnea [ ] palpitations [ ] murmur  Resp: [ ] negative [ ] cough [ ] shortness of breath [ ] dyspnea [ ] wheezing [ ] sputum [ ] hemoptysis  GI: [ ] negative [ ] nausea [ ] vomiting [ ] diarrhea [ ] constipation [ ] abd pain [ ] dysphagia   : [ ] negative [ ] dysuria [ ] nocturia [ ] hematuria [ ] increased urinary frequency  Musculoskeletal: [ ] negative [ ] back pain [ ] myalgias [ ] arthralgias [ ] fracture  Skin: [ ] negative [ ] rash [ ] itch  Neurological: [ ] negative [ ] headache [ ] dizziness [ ] syncope [ ] weakness [ ] numbness  Psychiatric: [ ] negative [ ] anxiety [ ] depression  Endocrine: [ ] negative [ ] diabetes [ ] thyroid problem  Hematologic/Lymphatic: [ ] negative [ ] anemia [ ] bleeding problem  Allergic/Immunologic: [ ] negative [ ] itchy eyes [ ] nasal discharge [ ] hives [ ] angioedema  [ ] All other systems negative  [ ] Unable to assess ROS because ________    OBJECTIVE:  ICU Vital Signs Last 24 Hrs  T(C): 37.1 (13 Feb 2018 04:00), Max: 37.1 (13 Feb 2018 04:00)  T(F): 98.7 (13 Feb 2018 04:00), Max: 98.7 (13 Feb 2018 04:00)  HR: 90 (13 Feb 2018 06:00) (75 - 96)  BP: 152/67 (13 Feb 2018 06:00) (109/50 - 163/67)  BP(mean): 96 (13 Feb 2018 06:00) (72 - 96)  ABP: --  ABP(mean): --  RR: 17 (13 Feb 2018 06:00) (14 - 36)  SpO2: 98% (13 Feb 2018 06:00) (95% - 100%)        02-12 @ 07:01  -  02-13 @ 07:00  --------------------------------------------------------  IN: 2390 mL / OUT: 1800 mL / NET: 590 mL      CAPILLARY BLOOD GLUCOSE  338 (12 Feb 2018 00:00)      POCT Blood Glucose.: 183 mg/dL (13 Feb 2018 05:02)      PHYSICAL EXAM:  General:   HEENT:   Lymph Nodes:  Neck:   Respiratory:   Cardiovascular:   Abdomen:   Extremities:   Skin:   Neurological:  Psychiatry:    LINES:    HOSPITAL MEDICATIONS:  aspirin  chewable 81 milliGRAM(s) Oral daily  heparin  Injectable 5000 Unit(s) SubCutaneous every 8 hours      carvedilol 12.5 milliGRAM(s) Oral every 12 hours  hydrALAZINE 25 milliGRAM(s) Oral every 8 hours    atorvastatin 40 milliGRAM(s) Oral at bedtime  dextrose 50% Injectable 12.5 Gram(s) IV Push once  dextrose 50% Injectable 25 Gram(s) IV Push once  dextrose 50% Injectable 25 Gram(s) IV Push once  dextrose Gel 1 Dose(s) Oral once PRN  glucagon  Injectable 1 milliGRAM(s) IntraMuscular once PRN  insulin lispro (HumaLOG) corrective regimen sliding scale   SubCutaneous every 6 hours  insulin NPH human recombinant 30 Unit(s) SubCutaneous <User Schedule>    ALBUTerol/ipratropium for Nebulization 3 milliLiter(s) Nebulizer every 6 hours            dextrose 5%. 1000 milliLiter(s) IV Continuous <Continuous>      chlorhexidine 0.12% Liquid 15 milliLiter(s) Swish and Spit every 4 hours PRN  hydrocortisone 1% Cream 1 Application(s) Topical daily  nystatin Powder 1 Application(s) Topical two times a day        LABS:                        7.7    5.9   )-----------( 281      ( 12 Feb 2018 23:42 )             23.3     Hgb Trend: 7.7<--, 7.8<--, 8.0<--, 8.2<--, 8.6<--  02-12    135  |  95<L>  |  27<H>  ----------------------------<  207<H>  4.2   |  31  |  2.33<H>    Ca    8.6      12 Feb 2018 23:42  Phos  4.6     02-12  Mg     2.1     02-12    TPro  6.4  /  Alb  2.2<L>  /  TBili  0.5  /  DBili  x   /  AST  36  /  ALT  63<H>  /  AlkPhos  264<H>  02-12    Creatinine Trend: 2.33<--, 3.13<--, 3.81<--, 2.51<--, 3.59<--, 2.02<--  PT/INR - ( 12 Feb 2018 23:42 )   PT: 10.1 sec;   INR: 0.93 ratio         PTT - ( 12 Feb 2018 23:42 )  PTT:27.1 sec          MICROBIOLOGY:     RADIOLOGY:  [ ] Reviewed and interpreted by me    EKG: CHIEF COMPLAINT:    Interval Events: Overnight had worsening SOB w/ US w/ B lines, planned for urgent HD this AM .    REVIEW OF SYSTEMS:  Constitutional: [X] negative [ ] fevers [ ] chills [ ] weight loss [ ] weight gain  HEENT: [X] negative [ ] dry eyes [ ] eye irritation [ ] postnasal drip [ ] nasal congestion  CV: [X] negative  [ ] chest pain [ ] orthopnea [ ] palpitations [ ] murmur  Resp: [ ] negative [ ] cough [X] shortness of breath [X] dyspnea [ ] wheezing [ ] sputum [ ] hemoptysis  GI: [X] negative [ ] nausea [ ] vomiting [ ] diarrhea [ ] constipation [ ] abd pain [ ] dysphagia   : [ ] negative [ ] dysuria [ ] nocturia [ ] hematuria [ ] increased urinary frequency  Musculoskeletal: [ ] negative [ ] back pain [ ] myalgias [ ] arthralgias [ ] fracture  Skin: [X] negative [ ] rash [ ] itch  Neurological: [X] negative [ ] headache [ ] dizziness [ ] syncope [ ] weakness [ ] numbness  Psychiatric: [ ] negative [ ] anxiety [ ] depression  Endocrine: [ ] negative [ ] diabetes [ ] thyroid problem  Hematologic/Lymphatic: [X] negative [ ] anemia [ ] bleeding problem  Allergic/Immunologic: [ ] negative [ ] itchy eyes [ ] nasal discharge [ ] hives [ ] angioedema  [ ] All other systems negative  [ ] Unable to assess ROS because ________    OBJECTIVE:  ICU Vital Signs Last 24 Hrs  T(C): 37.1 (13 Feb 2018 04:00), Max: 37.1 (13 Feb 2018 04:00)  T(F): 98.7 (13 Feb 2018 04:00), Max: 98.7 (13 Feb 2018 04:00)  HR: 90 (13 Feb 2018 06:00) (75 - 96)  BP: 152/67 (13 Feb 2018 06:00) (109/50 - 163/67)  BP(mean): 96 (13 Feb 2018 06:00) (72 - 96)  ABP: --  ABP(mean): --  RR: 17 (13 Feb 2018 06:00) (14 - 36)  SpO2: 98% (13 Feb 2018 06:00) (95% - 100%)        02-12 @ 07:01  -  02-13 @ 07:00  --------------------------------------------------------  IN: 2390 mL / OUT: 1800 mL / NET: 590 mL      CAPILLARY BLOOD GLUCOSE  338 (12 Feb 2018 00:00)      POCT Blood Glucose.: 183 mg/dL (13 Feb 2018 05:02)      PHYSICAL EXAM:  General: NAD   HEENT: MMM  Lymph Nodes:  Neck: no JVD  Respiratory: +ronchi bilaterally  Cardiovascular: RRR, no murmur  Abdomen: s/nt/nd  Extremities: 2+ lower extremity edema bilaterally  Skin: no rash   Neurological: AAOx2 (name/place)  Psychiatry:    LINES: [facility] MEDICATIONS:  aspirin  chewable 81 milliGRAM(s) Oral daily  heparin  Injectable 5000 Unit(s) SubCutaneous every 8 hours      carvedilol 12.5 milliGRAM(s) Oral every 12 hours  hydrALAZINE 25 milliGRAM(s) Oral every 8 hours    atorvastatin 40 milliGRAM(s) Oral at bedtime  dextrose 50% Injectable 12.5 Gram(s) IV Push once  dextrose 50% Injectable 25 Gram(s) IV Push once  dextrose 50% Injectable 25 Gram(s) IV Push once  dextrose Gel 1 Dose(s) Oral once PRN  glucagon  Injectable 1 milliGRAM(s) IntraMuscular once PRN  insulin lispro (HumaLOG) corrective regimen sliding scale   SubCutaneous every 6 hours  insulin NPH human recombinant 30 Unit(s) SubCutaneous <User Schedule>    ALBUTerol/ipratropium for Nebulization 3 milliLiter(s) Nebulizer every 6 hours            dextrose 5%. 1000 milliLiter(s) IV Continuous <Continuous>      chlorhexidine 0.12% Liquid 15 milliLiter(s) Swish and Spit every 4 hours PRN  hydrocortisone 1% Cream 1 Application(s) Topical daily  nystatin Powder 1 Application(s) Topical two times a day        LABS:                        7.7    5.9   )-----------( 281      ( 12 Feb 2018 23:42 )             23.3     Hgb Trend: 7.7<--, 7.8<--, 8.0<--, 8.2<--, 8.6<--  02-12    135  |  95<L>  |  27<H>  ----------------------------<  207<H>  4.2   |  31  |  2.33<H>    Ca    8.6      12 Feb 2018 23:42  Phos  4.6     02-12  Mg     2.1     02-12    TPro  6.4  /  Alb  2.2<L>  /  TBili  0.5  /  DBili  x   /  AST  36  /  ALT  63<H>  /  AlkPhos  264<H>  02-12    Creatinine Trend: 2.33<--, 3.13<--, 3.81<--, 2.51<--, 3.59<--, 2.02<--  PT/INR - ( 12 Feb 2018 23:42 )   PT: 10.1 sec;   INR: 0.93 ratio         PTT - ( 12 Feb 2018 23:42 )  PTT:27.1 sec          MICROBIOLOGY:     RADIOLOGY:  [ ] Reviewed and interpreted by me    EKG: CHIEF COMPLAINT:      Interval Events: Overnight had worsening SOB w/ US w/ B lines, planned for urgent HD this AM.  AAOx2 today (name, year).    REVIEW OF SYSTEMS:  Constitutional: [X] negative [ ] fevers [ ] chills [ ] weight loss [ ] weight gain  HEENT: [X] negative [ ] dry eyes [ ] eye irritation [ ] postnasal drip [ ] nasal congestion  CV: [X] negative  [ ] chest pain [ ] orthopnea [ ] palpitations [ ] murmur  Resp: [ ] negative [ ] cough [X] shortness of breath [X] dyspnea [ ] wheezing [ ] sputum [ ] hemoptysis  GI: [X] negative [ ] nausea [ ] vomiting [ ] diarrhea [ ] constipation [ ] abd pain [ ] dysphagia   : [ ] negative [ ] dysuria [ ] nocturia [ ] hematuria [ ] increased urinary frequency  Musculoskeletal: [ ] negative [ ] back pain [ ] myalgias [ ] arthralgias [ ] fracture  Skin: [X] negative [ ] rash [ ] itch  Neurological: [X] negative [ ] headache [ ] dizziness [ ] syncope [ ] weakness [ ] numbness  Psychiatric: [ ] negative [ ] anxiety [ ] depression  Endocrine: [ ] negative [ ] diabetes [ ] thyroid problem  Hematologic/Lymphatic: [X] negative [ ] anemia [ ] bleeding problem  Allergic/Immunologic: [ ] negative [ ] itchy eyes [ ] nasal discharge [ ] hives [ ] angioedema  [ ] All other systems negative  [ ] Unable to assess ROS because ________    OBJECTIVE:  ICU Vital Signs Last 24 Hrs  T(C): 37.1 (13 Feb 2018 04:00), Max: 37.1 (13 Feb 2018 04:00)  T(F): 98.7 (13 Feb 2018 04:00), Max: 98.7 (13 Feb 2018 04:00)  HR: 90 (13 Feb 2018 06:00) (75 - 96)  BP: 152/67 (13 Feb 2018 06:00) (109/50 - 163/67)  BP(mean): 96 (13 Feb 2018 06:00) (72 - 96)  ABP: --  ABP(mean): --  RR: 17 (13 Feb 2018 06:00) (14 - 36)  SpO2: 98% (13 Feb 2018 06:00) (95% - 100%)        02-12 @ 07:01  -  02-13 @ 07:00  --------------------------------------------------------  IN: 2390 mL / OUT: 1800 mL / NET: 590 mL      CAPILLARY BLOOD GLUCOSE  338 (12 Feb 2018 00:00)      POCT Blood Glucose.: 183 mg/dL (13 Feb 2018 05:02)      PHYSICAL EXAM:  General: NAD   HEENT: MMM  Lymph Nodes:  Neck: no JVD  Respiratory: +ronchi bilaterally  Cardiovascular: RRR, no murmur  Abdomen: s/nt/nd  Extremities: 2+ lower extremity edema bilaterally  Skin: no rash   Neurological: AAOx2 (name/place)      LINES: [facility] MEDICATIONS:  aspirin  chewable 81 milliGRAM(s) Oral daily  heparin  Injectable 5000 Unit(s) SubCutaneous every 8 hours      carvedilol 12.5 milliGRAM(s) Oral every 12 hours  hydrALAZINE 25 milliGRAM(s) Oral every 8 hours    atorvastatin 40 milliGRAM(s) Oral at bedtime  dextrose 50% Injectable 12.5 Gram(s) IV Push once  dextrose 50% Injectable 25 Gram(s) IV Push once  dextrose 50% Injectable 25 Gram(s) IV Push once  dextrose Gel 1 Dose(s) Oral once PRN  glucagon  Injectable 1 milliGRAM(s) IntraMuscular once PRN  insulin lispro (HumaLOG) corrective regimen sliding scale   SubCutaneous every 6 hours  insulin NPH human recombinant 30 Unit(s) SubCutaneous <User Schedule>    ALBUTerol/ipratropium for Nebulization 3 milliLiter(s) Nebulizer every 6 hours            dextrose 5%. 1000 milliLiter(s) IV Continuous <Continuous>      chlorhexidine 0.12% Liquid 15 milliLiter(s) Swish and Spit every 4 hours PRN  hydrocortisone 1% Cream 1 Application(s) Topical daily  nystatin Powder 1 Application(s) Topical two times a day        LABS:                        7.7    5.9   )-----------( 281      ( 12 Feb 2018 23:42 )             23.3     Hgb Trend: 7.7<--, 7.8<--, 8.0<--, 8.2<--, 8.6<--  02-12    135  |  95<L>  |  27<H>  ----------------------------<  207<H>  4.2   |  31  |  2.33<H>    Ca    8.6      12 Feb 2018 23:42  Phos  4.6     02-12  Mg     2.1     02-12    TPro  6.4  /  Alb  2.2<L>  /  TBili  0.5  /  DBili  x   /  AST  36  /  ALT  63<H>  /  AlkPhos  264<H>  02-12    Creatinine Trend: 2.33<--, 3.13<--, 3.81<--, 2.51<--, 3.59<--, 2.02<--  PT/INR - ( 12 Feb 2018 23:42 )   PT: 10.1 sec;   INR: 0.93 ratio         PTT - ( 12 Feb 2018 23:42 )  PTT:27.1 sec          MICROBIOLOGY:     RADIOLOGY:  [X] Reviewed and interpreted by me CXR 2/13 - patchy opacities c/w likely pulm edema, no pneumothorax, no pleural effusion.      EKG: CHIEF COMPLAINT:  Patient is a 67y old  Female who presents with a chief complaint of Fever, total body weakness (02 Feb 2018 13:44)      Interval Events: Overnight had worsening SOB w/ US w/ B lines, planned for urgent HD this AM.  AAOx2 today (name, year).    REVIEW OF SYSTEMS:  Constitutional: [X] negative [ ] fevers [ ] chills [ ] weight loss [ ] weight gain  HEENT: [X] negative [ ] dry eyes [ ] eye irritation [ ] postnasal drip [ ] nasal congestion  CV: [X] negative  [ ] chest pain [ ] orthopnea [ ] palpitations [ ] murmur  Resp: [ ] negative [ ] cough [X] shortness of breath [X] dyspnea [ ] wheezing [ ] sputum [ ] hemoptysis  GI: [X] negative [ ] nausea [ ] vomiting [ ] diarrhea [ ] constipation [ ] abd pain [ ] dysphagia   : [ ] negative [ ] dysuria [ ] nocturia [ ] hematuria [ ] increased urinary frequency  Musculoskeletal: [ ] negative [ ] back pain [ ] myalgias [ ] arthralgias [ ] fracture  Skin: [X] negative [ ] rash [ ] itch  Neurological: [X] negative [ ] headache [ ] dizziness [ ] syncope [ ] weakness [ ] numbness  Psychiatric: [ ] negative [ ] anxiety [ ] depression  Endocrine: [ ] negative [ ] diabetes [ ] thyroid problem  Hematologic/Lymphatic: [X] negative [ ] anemia [ ] bleeding problem  Allergic/Immunologic: [ ] negative [ ] itchy eyes [ ] nasal discharge [ ] hives [ ] angioedema  [x ] All other systems negative  [ ] Unable to assess ROS because ________    OBJECTIVE:  ICU Vital Signs Last 24 Hrs  T(C): 37.1 (13 Feb 2018 04:00), Max: 37.1 (13 Feb 2018 04:00)  T(F): 98.7 (13 Feb 2018 04:00), Max: 98.7 (13 Feb 2018 04:00)  HR: 90 (13 Feb 2018 06:00) (75 - 96)  BP: 152/67 (13 Feb 2018 06:00) (109/50 - 163/67)  BP(mean): 96 (13 Feb 2018 06:00) (72 - 96)  ABP: --  ABP(mean): --  RR: 17 (13 Feb 2018 06:00) (14 - 36)  SpO2: 98% (13 Feb 2018 06:00) (95% - 100%)        02-12 @ 07:01  -  02-13 @ 07:00  --------------------------------------------------------  IN: 2390 mL / OUT: 1800 mL / NET: 590 mL      CAPILLARY BLOOD GLUCOSE  338 (12 Feb 2018 00:00)      POCT Blood Glucose.: 183 mg/dL (13 Feb 2018 05:02)      PHYSICAL EXAM:  General: NAD   HEENT: MMM  Lymph Nodes:  Neck: no JVD  Respiratory: +ronchi bilaterally  Cardiovascular: RRR, no murmur  Abdomen: s/nt/nd  Extremities: 2+ lower extremity edema bilaterally  Skin: no rash   Neurological: AAOx2 (name/place)      LINES: [facility] MEDICATIONS:  aspirin  chewable 81 milliGRAM(s) Oral daily  heparin  Injectable 5000 Unit(s) SubCutaneous every 8 hours      carvedilol 12.5 milliGRAM(s) Oral every 12 hours  hydrALAZINE 25 milliGRAM(s) Oral every 8 hours    atorvastatin 40 milliGRAM(s) Oral at bedtime  dextrose 50% Injectable 12.5 Gram(s) IV Push once  dextrose 50% Injectable 25 Gram(s) IV Push once  dextrose 50% Injectable 25 Gram(s) IV Push once  dextrose Gel 1 Dose(s) Oral once PRN  glucagon  Injectable 1 milliGRAM(s) IntraMuscular once PRN  insulin lispro (HumaLOG) corrective regimen sliding scale   SubCutaneous every 6 hours  insulin NPH human recombinant 30 Unit(s) SubCutaneous <User Schedule>    ALBUTerol/ipratropium for Nebulization 3 milliLiter(s) Nebulizer every 6 hours            dextrose 5%. 1000 milliLiter(s) IV Continuous <Continuous>      chlorhexidine 0.12% Liquid 15 milliLiter(s) Swish and Spit every 4 hours PRN  hydrocortisone 1% Cream 1 Application(s) Topical daily  nystatin Powder 1 Application(s) Topical two times a day        LABS:                        7.7    5.9   )-----------( 281      ( 12 Feb 2018 23:42 )             23.3     Hgb Trend: 7.7<--, 7.8<--, 8.0<--, 8.2<--, 8.6<--  02-12    135  |  95<L>  |  27<H>  ----------------------------<  207<H>  4.2   |  31  |  2.33<H>    Ca    8.6      12 Feb 2018 23:42  Phos  4.6     02-12  Mg     2.1     02-12    TPro  6.4  /  Alb  2.2<L>  /  TBili  0.5  /  DBili  x   /  AST  36  /  ALT  63<H>  /  AlkPhos  264<H>  02-12    Creatinine Trend: 2.33<--, 3.13<--, 3.81<--, 2.51<--, 3.59<--, 2.02<--  PT/INR - ( 12 Feb 2018 23:42 )   PT: 10.1 sec;   INR: 0.93 ratio         PTT - ( 12 Feb 2018 23:42 )  PTT:27.1 sec          MICROBIOLOGY:     RADIOLOGY:  [X] Reviewed and interpreted by me CXR 2/13 - patchy opacities c/w likely pulm edema, no pneumothorax, no pleural effusion.      EKG:

## 2018-02-14 LAB
GAS PNL BLDA: SIGNIFICANT CHANGE UP
GLUCOSE BLDC GLUCOMTR-MCNC: 199 MG/DL — HIGH (ref 70–99)
GLUCOSE BLDC GLUCOMTR-MCNC: 235 MG/DL — HIGH (ref 70–99)
GLUCOSE BLDC GLUCOMTR-MCNC: 235 MG/DL — HIGH (ref 70–99)
GLUCOSE BLDC GLUCOMTR-MCNC: 325 MG/DL — HIGH (ref 70–99)

## 2018-02-14 PROCEDURE — 71045 X-RAY EXAM CHEST 1 VIEW: CPT | Mod: 26,77

## 2018-02-14 PROCEDURE — 71045 X-RAY EXAM CHEST 1 VIEW: CPT | Mod: 26

## 2018-02-14 PROCEDURE — 99291 CRITICAL CARE FIRST HOUR: CPT

## 2018-02-14 PROCEDURE — 99232 SBSQ HOSP IP/OBS MODERATE 35: CPT | Mod: GC,25

## 2018-02-14 RX ORDER — NOREPINEPHRINE BITARTRATE/D5W 8 MG/250ML
0.1 PLASTIC BAG, INJECTION (ML) INTRAVENOUS
Qty: 8 | Refills: 0 | Status: DISCONTINUED | OUTPATIENT
Start: 2018-02-14 | End: 2018-02-15

## 2018-02-14 RX ORDER — IPRATROPIUM/ALBUTEROL SULFATE 18-103MCG
3 AEROSOL WITH ADAPTER (GRAM) INHALATION ONCE
Qty: 0 | Refills: 0 | Status: COMPLETED | OUTPATIENT
Start: 2018-02-14 | End: 2018-02-14

## 2018-02-14 RX ORDER — HUMAN INSULIN 100 [IU]/ML
15 INJECTION, SUSPENSION SUBCUTANEOUS
Qty: 0 | Refills: 0 | Status: DISCONTINUED | OUTPATIENT
Start: 2018-02-14 | End: 2018-02-16

## 2018-02-14 RX ORDER — HYDROCORTISONE 1 %
1 OINTMENT (GRAM) TOPICAL EVERY 6 HOURS
Qty: 0 | Refills: 0 | Status: DISCONTINUED | OUTPATIENT
Start: 2018-02-14 | End: 2018-02-15

## 2018-02-14 RX ORDER — LABETALOL HCL 100 MG
10 TABLET ORAL ONCE
Qty: 0 | Refills: 0 | Status: COMPLETED | OUTPATIENT
Start: 2018-02-14 | End: 2018-02-14

## 2018-02-14 RX ADMIN — Medication 4: at 18:00

## 2018-02-14 RX ADMIN — HEPARIN SODIUM 5000 UNIT(S): 5000 INJECTION INTRAVENOUS; SUBCUTANEOUS at 23:30

## 2018-02-14 RX ADMIN — Medication 3 MILLILITER(S): at 12:15

## 2018-02-14 RX ADMIN — HUMAN INSULIN 10 UNIT(S): 100 INJECTION, SUSPENSION SUBCUTANEOUS at 12:00

## 2018-02-14 RX ADMIN — Medication 3 MILLILITER(S): at 20:08

## 2018-02-14 RX ADMIN — Medication 3 MILLILITER(S): at 02:08

## 2018-02-14 RX ADMIN — HUMAN INSULIN 15 UNIT(S): 100 INJECTION, SUSPENSION SUBCUTANEOUS at 18:01

## 2018-02-14 RX ADMIN — Medication 3 MILLILITER(S): at 05:18

## 2018-02-14 RX ADMIN — HUMAN INSULIN 15 UNIT(S): 100 INJECTION, SUSPENSION SUBCUTANEOUS at 23:35

## 2018-02-14 RX ADMIN — Medication 3 MILLILITER(S): at 23:45

## 2018-02-14 RX ADMIN — NYSTATIN CREAM 1 APPLICATION(S): 100000 CREAM TOPICAL at 05:07

## 2018-02-14 RX ADMIN — Medication 25 MILLIGRAM(S): at 05:06

## 2018-02-14 RX ADMIN — Medication 1 APPLICATION(S): at 12:03

## 2018-02-14 RX ADMIN — Medication 10 MILLIGRAM(S): at 02:40

## 2018-02-14 RX ADMIN — Medication 14.03 MICROGRAM(S)/KG/MIN: at 20:05

## 2018-02-14 RX ADMIN — Medication 8: at 05:07

## 2018-02-14 RX ADMIN — CARVEDILOL PHOSPHATE 12.5 MILLIGRAM(S): 80 CAPSULE, EXTENDED RELEASE ORAL at 05:06

## 2018-02-14 RX ADMIN — HEPARIN SODIUM 5000 UNIT(S): 5000 INJECTION INTRAVENOUS; SUBCUTANEOUS at 16:00

## 2018-02-14 RX ADMIN — Medication 81 MILLIGRAM(S): at 11:58

## 2018-02-14 RX ADMIN — Medication 2: at 23:36

## 2018-02-14 RX ADMIN — ATORVASTATIN CALCIUM 40 MILLIGRAM(S): 80 TABLET, FILM COATED ORAL at 21:43

## 2018-02-14 RX ADMIN — Medication 25 MILLIGRAM(S): at 14:36

## 2018-02-14 RX ADMIN — HEPARIN SODIUM 5000 UNIT(S): 5000 INJECTION INTRAVENOUS; SUBCUTANEOUS at 00:03

## 2018-02-14 RX ADMIN — HUMAN INSULIN 10 UNIT(S): 100 INJECTION, SUSPENSION SUBCUTANEOUS at 00:04

## 2018-02-14 RX ADMIN — Medication 25 MILLIGRAM(S): at 22:21

## 2018-02-14 RX ADMIN — Medication 3 MILLILITER(S): at 17:13

## 2018-02-14 RX ADMIN — HEPARIN SODIUM 5000 UNIT(S): 5000 INJECTION INTRAVENOUS; SUBCUTANEOUS at 08:00

## 2018-02-14 RX ADMIN — Medication 4: at 12:00

## 2018-02-14 RX ADMIN — Medication 2: at 00:03

## 2018-02-14 RX ADMIN — NYSTATIN CREAM 1 APPLICATION(S): 100000 CREAM TOPICAL at 18:00

## 2018-02-14 NOTE — SWALLOW BEDSIDE ASSESSMENT ADULT - SWALLOW EVAL: DIAGNOSIS
Chart reviewed, attempted to see pt for bedside swallow evaluation. Pt currently on continuous BIPAP. Case d/w MD: Ruperto who reported plan to place pt on nasal cannula. Service will therefore f/u to assess swallow function pending tolerance of nasal cannula. Pt presents with 1. an oral and suspected pharyngeal dysphagia superimposed upon baseline abdominal breathing (RN: Jordan alerted, pt cleared for exam. Additionally, pt's daughter reported this is pt's baseline) The swallow is marked by delayed oral transit time, suspected uncontrolled loss, delayed pharyngeal swallows, reduced hyolaryngeal excursion, multiple swallows, one instance Pt presents with 1. an oral and suspected pharyngeal dysphagia superimposed upon baseline abdominal breathing (RN: Jordan alerted, pt cleared for exam. Additionally, pt's daughter reported this is pt's baseline) The swallow is marked by delayed oral transit time, suspected uncontrolled loss, delayed pharyngeal swallows, reduced hyolaryngeal excursion, multiple swallows, one instance of coughing post PO intake of teaspoon amounts honey thick liquids and elevated RR following completion of exam. 2. Dysphonia marked by hoarse hypophonic vocal quality

## 2018-02-14 NOTE — PROGRESS NOTE ADULT - ATTENDING COMMENTS
Agree with assessment and plan as above by Dr. English. Reviewed all pertinent labs, glucose values, and imaging studies. Modifications made as indicated above.     Andi Low D.O  [phone]

## 2018-02-14 NOTE — SWALLOW BEDSIDE ASSESSMENT ADULT - ORAL PHASE
Delayed oral transit time/suspect uncontrolled loss suspect uncontrolled loss/Delayed oral transit time

## 2018-02-14 NOTE — PROGRESS NOTE ADULT - ATTENDING COMMENTS
Agree with above. Patient seen and examined.  -Influenza - s/p treatment now with improvement in respiratory status  -Cardiac arrest - ~35 min in total with ROSC - now no longer in shock  -BIlateral pneumothoraces - now with  a single remaining chest tube in left lung - patient without respiratory distress - will plan to clamp tube and check CXR - will remove chest tube today as no residual ptx  -Acute Renal failure after cardiac arrest - now continue HD but with aim to make net negative again today - was finally net negative yesterday- will aim to take more fluid off with HD today. Lung US continues to show bilateral B lines  -CVA     Critical Care Time 35 minutes

## 2018-02-14 NOTE — SWALLOW BEDSIDE ASSESSMENT ADULT - COMMENTS
*** Case d/w MD: Diana on 2/14/2018, pt s/p esophagram yesterday, cleared for PO intake for evaluation purposes

## 2018-02-14 NOTE — PROGRESS NOTE ADULT - ASSESSMENT
68 y/o F with T2DM uncontrolled on insulin, osteoporosis, HTN, here with acute respiratory failure 2/2 influenza s/p PEA arrest x 2, failed speech and swallow, current on tube feeds

## 2018-02-14 NOTE — PROGRESS NOTE ADULT - SUBJECTIVE AND OBJECTIVE BOX
Chief Complaint: f/u DM2    History:  Patient is awake and alert at bedside, currently on tube feeds at 80 cc/hr. Awake and alert, appears comfortable. Minimally verbal, undergoing repeat speech and swallow, speech and swallow recommending that patient remain NPO. Tube feeds held overnight from 3 am to 12 pm this afternoon    MEDICATIONS  (STANDING):  ALBUTerol/ipratropium for Nebulization 3 milliLiter(s) Nebulizer every 6 hours  aspirin  chewable 81 milliGRAM(s) Oral daily  atorvastatin 40 milliGRAM(s) Oral at bedtime  carvedilol 12.5 milliGRAM(s) Oral every 12 hours  dextrose 5%. 1000 milliLiter(s) (50 mL/Hr) IV Continuous <Continuous>  dextrose 5%. 1000 milliLiter(s) (50 mL/Hr) IV Continuous <Continuous>  dextrose 50% Injectable 12.5 Gram(s) IV Push once  dextrose 50% Injectable 25 Gram(s) IV Push once  dextrose 50% Injectable 25 Gram(s) IV Push once  dextrose 50% Injectable 12.5 Gram(s) IV Push once  dextrose 50% Injectable 25 Gram(s) IV Push once  dextrose 50% Injectable 25 Gram(s) IV Push once  heparin  Injectable 5000 Unit(s) SubCutaneous every 8 hours  hydrALAZINE 25 milliGRAM(s) Oral every 8 hours  hydrocortisone 1% Cream 1 Application(s) Topical daily  insulin lispro (HumaLOG) corrective regimen sliding scale   SubCutaneous every 6 hours  insulin NPH human recombinant 10 Unit(s) SubCutaneous <User Schedule>  nystatin Powder 1 Application(s) Topical two times a day    MEDICATIONS  (PRN):  chlorhexidine 0.12% Liquid 15 milliLiter(s) Swish and Spit every 4 hours PRN mouth hygiene  dextrose Gel 1 Dose(s) Oral once PRN Blood Glucose LESS THAN 70 milliGRAM(s)/deciliter  dextrose Gel 1 Dose(s) Oral once PRN Blood Glucose LESS THAN 70 milliGRAM(s)/deciliter  glucagon  Injectable 1 milliGRAM(s) IntraMuscular once PRN Glucose LESS THAN 70 milligrams/deciliter  glucagon  Injectable 1 milliGRAM(s) IntraMuscular once PRN Glucose LESS THAN 70 milligrams/deciliter      Allergies    NIFEdipine (Urticaria; Hives)  vitamin E (Short breath; Urticaria; Hives)    Review of Systems:  Unable to obtain, patient is minimally verbal at this time    PHYSICAL EXAM:  VITALS: T(C): 36.3 (02-14-18 @ 15:00)  T(F): 97.3 (02-14-18 @ 15:00), Max: 98.7 (02-14-18 @ 00:00)  HR: 83 (02-14-18 @ 16:00) (77 - 98)  BP: 159/71 (02-14-18 @ 16:00) (128/65 - 177/77)  RR:  (17 - 28)  SpO2:  (92% - 100%)  Wt(kg): --  GENERAL: NAD, well-developed  EYES: No proptosis, anicteric  HEENT:  Atraumatic, Normocephalic,   RESPIRATORY: Clear to auscultation bilaterally; No rales, rhonchi, wheezing, or rubs  CARDIOVASCULAR: Regular rate and rhythm; No murmurs  GI: Soft, nontender, non distended  PSYCH: Alert and alert      POCT Blood Glucose.: 235 mg/dL (02-14-18 @ 11:48) NPH 10, 4  POCT Blood Glucose.: 325 mg/dL (02-14-18 @ 05:05)  H8                                                                                NPH 10  POCT Blood Glucose.: 141 mg/dL (02-13-18 @ 17:15) NPH 10, 2  POCT Blood Glucose.: 94 mg/dL (02-13-18 @ 11:49)  POCT Blood Glucose.: 183 mg/dL (02-13-18 @ 05:02)  POCT Blood Glucose.: 131 mg/dL (02-12-18 @ 17:15)   POCT Blood Glucose.: 118 mg/dL (02-12-18 @ 11:45)  POCT Blood Glucose.: 272 mg/dL (02-12-18 @ 05:09)  POCT Blood Glucose.: 185 mg/dL (02-11-18 @ 19:03)      02-13    138  |  98  |  24<H>  ----------------------------<  179<H>  4.6   |  30  |  2.04<H>    EGFR if : 28<L>  EGFR if non : 24<L>    Ca    8.9      02-13  Mg     2.4     02-13  Phos  5.6     02-13    TPro  6.9  /  Alb  2.3<L>  /  TBili  0.5  /  DBili  x   /  AST  53<H>  /  ALT  65<H>  /  AlkPhos  338<H>  02-13          Thyroid Function Tests:      Hemoglobin A1C, Whole Blood: 8.6 % <H> [4.0 - 5.6] (01-31-18 @ 07:15)  Hemoglobin A1C, Whole Blood: 8.7 % <H> [4.0 - 5.6] (01-31-18 @ 05:51)

## 2018-02-14 NOTE — PROGRESS NOTE ADULT - ASSESSMENT
67F w PMH of bilateral Breast CA (on Trastuzumab, last dose Jan 20th) admitted to MICU with influenza PNA c/b PEA arrest x 2 with ROSC x 2 (10 min + 30 min). CPR c/b bilateral PTX (s/p bilateral chest tubes), pneumoperitoneum (s/p Peritoneal drain (pigtail) in right hemiabdomen and paracentesis decompression) a/w extensive subcutaneous emphysema. Pt received 100 mg of empiric TPA in ED. Surgery consulted and not recommending surgical intervention.   All 3 chest tubes removed on 1/31. Pt had cardiac MRI on 2/1 which was wnl. Right sided IJ removed and right shiley placed and HD initiated on 2/1. Abdominal drain removed on 2/1.  Patients pneumothorax resolved, still with L chest tube. Patient chest tube now clamped.    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   Alert and Oriented x1-2      Skin:  Lines: Right IJ Shiley  Tubes: L chest tube   Decubiti: None  Erythematous rash- likely contact dermatitis from ECG leads- topical hydrocortisone     GI:  c/w with NG tube feeds  esophagram no abnormalities  plan for speech and swallow today after patient stable off BIPAP and dialysis    Metabolic:  RUSS 2/2 ATN 2/2 PEA arrest  urgent HD today for fluid overload - f/u with renal re: fluid removal, plan for 2-3L removal today if bp tolerates. goal to remain net negative, plan for ultrafiltration toady per renal.  Will need permacath once on floors     Endocrine  FS well controlled ON  c/w current regimen    Volume Assessment:  no edema     DVT prophylaxis with HSQ     Infectious Disease:  afebrile without signs of infection    Hemodynamics:  hemodynamically stable off pressors    Cardiovascular:.  Cardiac MRI unremarkable. Shows normal RV and LV function.   c/w coreg 12.5mg BID, hydralazine 25mg PO q8h    Respiratory:  urgent HD today for shortness of breath with B lines on US   c/w duonebs   - patient SOB ON on BIPAP  - plan for trial off BIPAP  - plan to remove chest tube later today

## 2018-02-14 NOTE — PROGRESS NOTE ADULT - PROBLEM SELECTOR PLAN 1
- patient currently on NPH 10 units q6 hours while tube feeds are on (no dose scheduled for 6 am)  -Check bs q6  -POC q6  - for now, c/w NPH 10 units sq at 6pm, 12am and 12pm as TF are held from 6am-11am as there is not enough FS data to determine adequacy of dose - patient currently on NPH 10 units q6 hours while tube feeds are on (no dose scheduled for 6 am)  -Check bs q6  -POC q6  - for now, increase NPH to 15 units sq at 6pm, 12am and 12pm as TF are held from 6am-11am

## 2018-02-14 NOTE — SWALLOW BEDSIDE ASSESSMENT ADULT - PHARYNGEAL PHASE
Multiple swallows/one episode of coughing post PO intake, increased RR and wheezing post PO intake./Decreased laryngeal elevation/Delayed pharyngeal swallow Decreased laryngeal elevation/Delayed pharyngeal swallow/Increased RR and wheezing post PO intake.

## 2018-02-14 NOTE — PROGRESS NOTE ADULT - SUBJECTIVE AND OBJECTIVE BOX
Interval Events: Patient had SOB overnight and was placed on BIPAP. Patient was hypertensive and received IV labetalol 10mgx1.     REVIEW OF SYSTEMS:  Constitutional: [ ] negative [ ] fevers [ ] chills [ ] weight loss [ ] weight gain  HEENT: [ ] negative [ ] dry eyes [ ] eye irritation [ ] postnasal drip [ ] nasal congestion  CV: [ ] negative  [ ] chest pain [ ] orthopnea [ ] palpitations [ ] murmur  Resp: [ ] negative [ ] cough [ ] shortness of breath [ ] dyspnea [ ] wheezing [ ] sputum [ ] hemoptysis  GI: [ ] negative [ ] nausea [ ] vomiting [ ] diarrhea [ ] constipation [ ] abd pain [ ] dysphagia   : [ ] negative [ ] dysuria [ ] nocturia [ ] hematuria [ ] increased urinary frequency  Musculoskeletal: [ ] negative [ ] back pain [ ] myalgias [ ] arthralgias [ ] fracture  Skin: [ ] negative [ ] rash [ ] itch  Neurological: [ ] negative [ ] headache [ ] dizziness [ ] syncope [ ] weakness [ ] numbness  Psychiatric: [ ] negative [ ] anxiety [ ] depression  Endocrine: [ ] negative [ ] diabetes [ ] thyroid problem  Hematologic/Lymphatic: [ ] negative [ ] anemia [ ] bleeding problem  Allergic/Immunologic: [ ] negative [ ] itchy eyes [ ] nasal discharge [ ] hives [ ] angioedema  [ ] All other systems negative  [x ] Unable to assess ROS because patient on BIPAP    OBJECTIVE:  ICU Vital Signs Last 24 Hrs  T(C): 36.3 (14 Feb 2018 11:00), Max: 37.1 (14 Feb 2018 00:00)  T(F): 97.4 (14 Feb 2018 11:00), Max: 98.7 (14 Feb 2018 00:00)  HR: 86 (14 Feb 2018 11:22) (77 - 98)  BP: 174/72 (14 Feb 2018 11:00) (122/58 - 174/72)  BP(mean): 104 (14 Feb 2018 11:00) (82 - 111)  ABP: --  ABP(mean): --  RR: 20 (14 Feb 2018 11:00) (16 - 27)  SpO2: 96% (14 Feb 2018 11:22) (93% - 100%)        02-13 @ 07:01  -  02-14 @ 07:00  --------------------------------------------------------  IN: 940 mL / OUT: 2000 mL / NET: -1060 mL    02-14 @ 07:01  - 02-14 @ 11:34  --------------------------------------------------------  IN: 0 mL / OUT: 0 mL / NET: 0 mL      CAPILLARY BLOOD GLUCOSE      POCT Blood Glucose.: 325 mg/dL (14 Feb 2018 05:05)      PHYSICAL EXAM:  General: NAD, on BIPAP  HEENT: At/NC  Respiratory: clear to ascultation anteriorly  Cardiovascular: RRR, (+) S1/S2  Abdomen: soft, non-tender, non-distended  Extremities:   Skin:   Neurological:  Psychiatry:    LINES:    HOSPITAL MEDICATIONS:  aspirin  chewable 81 milliGRAM(s) Oral daily  heparin  Injectable 5000 Unit(s) SubCutaneous every 8 hours      carvedilol 12.5 milliGRAM(s) Oral every 12 hours  hydrALAZINE 25 milliGRAM(s) Oral every 8 hours    atorvastatin 40 milliGRAM(s) Oral at bedtime  dextrose 50% Injectable 12.5 Gram(s) IV Push once  dextrose 50% Injectable 25 Gram(s) IV Push once  dextrose 50% Injectable 25 Gram(s) IV Push once  dextrose 50% Injectable 12.5 Gram(s) IV Push once  dextrose 50% Injectable 25 Gram(s) IV Push once  dextrose 50% Injectable 25 Gram(s) IV Push once  dextrose Gel 1 Dose(s) Oral once PRN  dextrose Gel 1 Dose(s) Oral once PRN  glucagon  Injectable 1 milliGRAM(s) IntraMuscular once PRN  glucagon  Injectable 1 milliGRAM(s) IntraMuscular once PRN  insulin lispro (HumaLOG) corrective regimen sliding scale   SubCutaneous every 6 hours  insulin NPH human recombinant 10 Unit(s) SubCutaneous <User Schedule>    ALBUTerol/ipratropium for Nebulization 3 milliLiter(s) Nebulizer every 6 hours            dextrose 5%. 1000 milliLiter(s) IV Continuous <Continuous>  dextrose 5%. 1000 milliLiter(s) IV Continuous <Continuous>      chlorhexidine 0.12% Liquid 15 milliLiter(s) Swish and Spit every 4 hours PRN  hydrocortisone 1% Cream 1 Application(s) Topical daily  nystatin Powder 1 Application(s) Topical two times a day        LABS:                        8.1    6.1   )-----------( 283      ( 13 Feb 2018 23:13 )             24.7     Hgb Trend: 8.1<--, 7.7<--, 7.8<--, 8.0<--, 8.2<--  02-13    138  |  98  |  24<H>  ----------------------------<  179<H>  4.6   |  30  |  2.04<H>    Ca    8.9      13 Feb 2018 23:13  Phos  5.6     02-13  Mg     2.4     02-13    TPro  6.9  /  Alb  2.3<L>  /  TBili  0.5  /  DBili  x   /  AST  53<H>  /  ALT  65<H>  /  AlkPhos  338<H>  02-13    Creatinine Trend: 2.04<--, 2.33<--, 3.13<--, 3.81<--, 2.51<--, 3.59<--  PT/INR - ( 13 Feb 2018 23:13 )   PT: 10.0 sec;   INR: 0.93 ratio         PTT - ( 13 Feb 2018 23:13 )  PTT:30.9 sec    Arterial Blood Gas:  02-13 @ 23:05  7.40/52/142/32/100/6.6  ABG lactate: --        MICROBIOLOGY:     RADIOLOGY:  [ ] Reviewed and interpreted by me    EKG: Interval Events: Patient had SOB overnight and was placed on BIPAP. Patient was hypertensive and received IV labetalol 10mgx1.     REVIEW OF SYSTEMS:  Constitutional: [ ] negative [ ] fevers [ ] chills [ ] weight loss [ ] weight gain  HEENT: [ ] negative [ ] dry eyes [ ] eye irritation [ ] postnasal drip [ ] nasal congestion  CV: [ ] negative  [ ] chest pain [ ] orthopnea [ ] palpitations [ ] murmur  Resp: [ ] negative [ ] cough [ ] shortness of breath [ ] dyspnea [ ] wheezing [ ] sputum [ ] hemoptysis  GI: [ ] negative [ ] nausea [ ] vomiting [ ] diarrhea [ ] constipation [ ] abd pain [ ] dysphagia   : [ ] negative [ ] dysuria [ ] nocturia [ ] hematuria [ ] increased urinary frequency  Musculoskeletal: [ ] negative [ ] back pain [ ] myalgias [ ] arthralgias [ ] fracture  Skin: [ ] negative [ ] rash [ ] itch  Neurological: [ ] negative [ ] headache [ ] dizziness [ ] syncope [ ] weakness [ ] numbness  Psychiatric: [ ] negative [ ] anxiety [ ] depression  Endocrine: [ ] negative [ ] diabetes [ ] thyroid problem  Hematologic/Lymphatic: [ ] negative [ ] anemia [ ] bleeding problem  Allergic/Immunologic: [ ] negative [ ] itchy eyes [ ] nasal discharge [ ] hives [ ] angioedema  [ ] All other systems negative  [x ] Unable to assess ROS because patient on BIPAP    OBJECTIVE:  ICU Vital Signs Last 24 Hrs  T(C): 36.3 (14 Feb 2018 11:00), Max: 37.1 (14 Feb 2018 00:00)  T(F): 97.4 (14 Feb 2018 11:00), Max: 98.7 (14 Feb 2018 00:00)  HR: 86 (14 Feb 2018 11:22) (77 - 98)  BP: 174/72 (14 Feb 2018 11:00) (122/58 - 174/72)  BP(mean): 104 (14 Feb 2018 11:00) (82 - 111)  ABP: --  ABP(mean): --  RR: 20 (14 Feb 2018 11:00) (16 - 27)  SpO2: 96% (14 Feb 2018 11:22) (93% - 100%)        02-13 @ 07:01  -  02-14 @ 07:00  --------------------------------------------------------  IN: 940 mL / OUT: 2000 mL / NET: -1060 mL    02-14 @ 07:01  - 02-14 @ 11:34  --------------------------------------------------------  IN: 0 mL / OUT: 0 mL / NET: 0 mL      CAPILLARY BLOOD GLUCOSE      POCT Blood Glucose.: 325 mg/dL (14 Feb 2018 05:05)      PHYSICAL EXAM:  General: NAD, on BIPAP  HEENT: At/NC  Respiratory: clear to ascultation anteriorly  Cardiovascular: RRR, (+) S1/S2  Abdomen: soft, non-tender, non-distended  Extremities: no clubbing, cyanosis  Skin: clean, dry, intact, rash overlying distribution of ECG leads  Neurological: non-focal  Psychiatry: alert and oriented x1-2    LINES:    HOSPITAL MEDICATIONS:  aspirin  chewable 81 milliGRAM(s) Oral daily  heparin  Injectable 5000 Unit(s) SubCutaneous every 8 hours      carvedilol 12.5 milliGRAM(s) Oral every 12 hours  hydrALAZINE 25 milliGRAM(s) Oral every 8 hours    atorvastatin 40 milliGRAM(s) Oral at bedtime  dextrose 50% Injectable 12.5 Gram(s) IV Push once  dextrose 50% Injectable 25 Gram(s) IV Push once  dextrose 50% Injectable 25 Gram(s) IV Push once  dextrose 50% Injectable 12.5 Gram(s) IV Push once  dextrose 50% Injectable 25 Gram(s) IV Push once  dextrose 50% Injectable 25 Gram(s) IV Push once  dextrose Gel 1 Dose(s) Oral once PRN  dextrose Gel 1 Dose(s) Oral once PRN  glucagon  Injectable 1 milliGRAM(s) IntraMuscular once PRN  glucagon  Injectable 1 milliGRAM(s) IntraMuscular once PRN  insulin lispro (HumaLOG) corrective regimen sliding scale   SubCutaneous every 6 hours  insulin NPH human recombinant 10 Unit(s) SubCutaneous <User Schedule>    ALBUTerol/ipratropium for Nebulization 3 milliLiter(s) Nebulizer every 6 hours            dextrose 5%. 1000 milliLiter(s) IV Continuous <Continuous>  dextrose 5%. 1000 milliLiter(s) IV Continuous <Continuous>      chlorhexidine 0.12% Liquid 15 milliLiter(s) Swish and Spit every 4 hours PRN  hydrocortisone 1% Cream 1 Application(s) Topical daily  nystatin Powder 1 Application(s) Topical two times a day        LABS:                        8.1    6.1   )-----------( 283      ( 13 Feb 2018 23:13 )             24.7     Hgb Trend: 8.1<--, 7.7<--, 7.8<--, 8.0<--, 8.2<--  02-13    138  |  98  |  24<H>  ----------------------------<  179<H>  4.6   |  30  |  2.04<H>    Ca    8.9      13 Feb 2018 23:13  Phos  5.6     02-13  Mg     2.4     02-13    TPro  6.9  /  Alb  2.3<L>  /  TBili  0.5  /  DBili  x   /  AST  53<H>  /  ALT  65<H>  /  AlkPhos  338<H>  02-13    Creatinine Trend: 2.04<--, 2.33<--, 3.13<--, 3.81<--, 2.51<--, 3.59<--  PT/INR - ( 13 Feb 2018 23:13 )   PT: 10.0 sec;   INR: 0.93 ratio         PTT - ( 13 Feb 2018 23:13 )  PTT:30.9 sec    Arterial Blood Gas:  02-13 @ 23:05  7.40/52/142/32/100/6.6  ABG lactate: --        EKG: Patient is a 67y old  Female who presents with a chief complaint of Fever, total body weakness (02 Feb 2018 13:44)    Interval Events: Patient had SOB overnight and was placed on BIPAP. Patient was hypertensive and received IV labetalol 10mgx1.     REVIEW OF SYSTEMS:  Constitutional: [ ] negative [ ] fevers [ ] chills [ ] weight loss [ ] weight gain  HEENT: [ ] negative [ ] dry eyes [ ] eye irritation [ ] postnasal drip [ ] nasal congestion  CV: [ ] negative  [ ] chest pain [ ] orthopnea [ ] palpitations [ ] murmur  Resp: [ ] negative [ ] cough [ ] shortness of breath [ ] dyspnea [ ] wheezing [ ] sputum [ ] hemoptysis  GI: [ ] negative [ ] nausea [ ] vomiting [ ] diarrhea [ ] constipation [ ] abd pain [ ] dysphagia   : [ ] negative [ ] dysuria [ ] nocturia [ ] hematuria [ ] increased urinary frequency  Musculoskeletal: [ ] negative [ ] back pain [ ] myalgias [ ] arthralgias [ ] fracture  Skin: [ ] negative [ ] rash [ ] itch  Neurological: [ ] negative [ ] headache [ ] dizziness [ ] syncope [ ] weakness [ ] numbness  Psychiatric: [ ] negative [ ] anxiety [ ] depression  Endocrine: [ ] negative [ ] diabetes [ ] thyroid problem  Hematologic/Lymphatic: [ ] negative [ ] anemia [ ] bleeding problem  Allergic/Immunologic: [ ] negative [ ] itchy eyes [ ] nasal discharge [ ] hives [ ] angioedema  [ ] All other systems negative  [x ] Unable to assess ROS because patient on BIPAP    OBJECTIVE:  ICU Vital Signs Last 24 Hrs  T(C): 36.3 (14 Feb 2018 11:00), Max: 37.1 (14 Feb 2018 00:00)  T(F): 97.4 (14 Feb 2018 11:00), Max: 98.7 (14 Feb 2018 00:00)  HR: 86 (14 Feb 2018 11:22) (77 - 98)  BP: 174/72 (14 Feb 2018 11:00) (122/58 - 174/72)  BP(mean): 104 (14 Feb 2018 11:00) (82 - 111)  ABP: --  ABP(mean): --  RR: 20 (14 Feb 2018 11:00) (16 - 27)  SpO2: 96% (14 Feb 2018 11:22) (93% - 100%)        02-13 @ 07:01 - 02-14 @ 07:00  --------------------------------------------------------  IN: 940 mL / OUT: 2000 mL / NET: -1060 mL    02-14 @ 07:01 - 02-14 @ 11:34  --------------------------------------------------------  IN: 0 mL / OUT: 0 mL / NET: 0 mL      CAPILLARY BLOOD GLUCOSE      POCT Blood Glucose.: 325 mg/dL (14 Feb 2018 05:05)      PHYSICAL EXAM:  General: NAD, on BIPAP  HEENT: At/NC  Respiratory: clear to ascultation anteriorly  Cardiovascular: RRR, (+) S1/S2  Abdomen: soft, non-tender, non-distended  Extremities: no clubbing, cyanosis  Skin: clean, dry, intact, rash overlying distribution of ECG leads  Neurological: non-focal  Psychiatry: alert and oriented x1-2    LINES:    HOSPITAL MEDICATIONS:  aspirin  chewable 81 milliGRAM(s) Oral daily  heparin  Injectable 5000 Unit(s) SubCutaneous every 8 hours      carvedilol 12.5 milliGRAM(s) Oral every 12 hours  hydrALAZINE 25 milliGRAM(s) Oral every 8 hours    atorvastatin 40 milliGRAM(s) Oral at bedtime  dextrose 50% Injectable 12.5 Gram(s) IV Push once  dextrose 50% Injectable 25 Gram(s) IV Push once  dextrose 50% Injectable 25 Gram(s) IV Push once  dextrose 50% Injectable 12.5 Gram(s) IV Push once  dextrose 50% Injectable 25 Gram(s) IV Push once  dextrose 50% Injectable 25 Gram(s) IV Push once  dextrose Gel 1 Dose(s) Oral once PRN  dextrose Gel 1 Dose(s) Oral once PRN  glucagon  Injectable 1 milliGRAM(s) IntraMuscular once PRN  glucagon  Injectable 1 milliGRAM(s) IntraMuscular once PRN  insulin lispro (HumaLOG) corrective regimen sliding scale   SubCutaneous every 6 hours  insulin NPH human recombinant 10 Unit(s) SubCutaneous <User Schedule>    ALBUTerol/ipratropium for Nebulization 3 milliLiter(s) Nebulizer every 6 hours            dextrose 5%. 1000 milliLiter(s) IV Continuous <Continuous>  dextrose 5%. 1000 milliLiter(s) IV Continuous <Continuous>      chlorhexidine 0.12% Liquid 15 milliLiter(s) Swish and Spit every 4 hours PRN  hydrocortisone 1% Cream 1 Application(s) Topical daily  nystatin Powder 1 Application(s) Topical two times a day        LABS:                        8.1    6.1   )-----------( 283      ( 13 Feb 2018 23:13 )             24.7     Hgb Trend: 8.1<--, 7.7<--, 7.8<--, 8.0<--, 8.2<--  02-13    138  |  98  |  24<H>  ----------------------------<  179<H>  4.6   |  30  |  2.04<H>    Ca    8.9      13 Feb 2018 23:13  Phos  5.6     02-13  Mg     2.4     02-13    TPro  6.9  /  Alb  2.3<L>  /  TBili  0.5  /  DBili  x   /  AST  53<H>  /  ALT  65<H>  /  AlkPhos  338<H>  02-13    Creatinine Trend: 2.04<--, 2.33<--, 3.13<--, 3.81<--, 2.51<--, 3.59<--  PT/INR - ( 13 Feb 2018 23:13 )   PT: 10.0 sec;   INR: 0.93 ratio         PTT - ( 13 Feb 2018 23:13 )  PTT:30.9 sec    Arterial Blood Gas:  02-13 @ 23:05  7.40/52/142/32/100/6.6  ABG lactate: --        EKG:

## 2018-02-14 NOTE — PROGRESS NOTE ADULT - SUBJECTIVE AND OBJECTIVE BOX
[facility] DIVISION OF KIDNEY DISEASES AND HYPERTENSION -- FOLLOW UP NOTE  --------------------------------------------------------------------------------  Chief Complaint:    24 hour events/subjective:        PAST HISTORY  --------------------------------------------------------------------------------  No significant changes to PMH, PSH, FHx, SHx, unless otherwise noted    ALLERGIES & MEDICATIONS  --------------------------------------------------------------------------------  Allergies    NIFEdipine (Urticaria; Hives)  vitamin E (Short breath; Urticaria; Hives)    Intolerances      Standing Inpatient Medications  ALBUTerol/ipratropium for Nebulization 3 milliLiter(s) Nebulizer every 6 hours  aspirin  chewable 81 milliGRAM(s) Oral daily  atorvastatin 40 milliGRAM(s) Oral at bedtime  carvedilol 12.5 milliGRAM(s) Oral every 12 hours  dextrose 5%. 1000 milliLiter(s) IV Continuous <Continuous>  dextrose 5%. 1000 milliLiter(s) IV Continuous <Continuous>  dextrose 50% Injectable 12.5 Gram(s) IV Push once  dextrose 50% Injectable 25 Gram(s) IV Push once  dextrose 50% Injectable 25 Gram(s) IV Push once  dextrose 50% Injectable 12.5 Gram(s) IV Push once  dextrose 50% Injectable 25 Gram(s) IV Push once  dextrose 50% Injectable 25 Gram(s) IV Push once  heparin  Injectable 5000 Unit(s) SubCutaneous every 8 hours  hydrALAZINE 25 milliGRAM(s) Oral every 8 hours  hydrocortisone 1% Cream 1 Application(s) Topical daily  insulin lispro (HumaLOG) corrective regimen sliding scale   SubCutaneous every 6 hours  insulin NPH human recombinant 10 Unit(s) SubCutaneous <User Schedule>  nystatin Powder 1 Application(s) Topical two times a day    PRN Inpatient Medications  chlorhexidine 0.12% Liquid 15 milliLiter(s) Swish and Spit every 4 hours PRN  dextrose Gel 1 Dose(s) Oral once PRN  dextrose Gel 1 Dose(s) Oral once PRN  glucagon  Injectable 1 milliGRAM(s) IntraMuscular once PRN  glucagon  Injectable 1 milliGRAM(s) IntraMuscular once PRN      REVIEW OF SYSTEMS  --------------------------------------------------------------------------------  Gen: No weight changes, fatigue, fevers/chills, weakness  Skin: No rashes  Head/Eyes/Ears/Mouth: No headache; Normal hearing; Normal vision w/o blurriness; No sinus pain/discomfort, sore throat  Respiratory: No dyspnea, cough, wheezing, hemoptysis  CV: No chest pain, PND, orthopnea  GI: No abdominal pain, diarrhea, constipation, nausea, vomiting, melena, hematochezia  : No increased frequency, dysuria, hematuria, nocturia  MSK: No joint pain/swelling; no back pain; no edema  Neuro: No dizziness/lightheadedness, weakness, seizures, numbness, tingling  Heme: No easy bruising or bleeding  Endo: No heat/cold intolerance  Psych: No significant nervousness, anxiety, stress, depression    All other systems were reviewed and are negative, except as noted.    VITALS/PHYSICAL EXAM  --------------------------------------------------------------------------------  T(C): 36.8 (02-14-18 @ 07:00), Max: 37.1 (02-14-18 @ 00:00)  HR: 82 (02-14-18 @ 08:00) (76 - 98)  BP: 170/72 (02-14-18 @ 08:00) (87/47 - 170/72)  RR: 24 (02-14-18 @ 08:00) (13 - 27)  SpO2: 99% (02-14-18 @ 08:00) (93% - 100%)  Wt(kg): --        02-13-18 @ 07:01  -  02-14-18 @ 07:00  --------------------------------------------------------  IN: 940 mL / OUT: 2000 mL / NET: -1060 mL    02-14-18 @ 07:01  -  02-14-18 @ 08:34  --------------------------------------------------------  IN: 0 mL / OUT: 0 mL / NET: 0 mL      Physical Exam:  	Gen: NAD, well-appearing  	HEENT: PERRL, supple neck, clear oropharynx  	Pulm: CTA B/L  	CV: RRR, S1S2; no rub  	Back: No spinal or CVA tenderness; no sacral edema  	Abd: +BS, soft, nontender/nondistended  	: No suprapubic tenderness  	UE: Warm, FROM, no clubbing, intact strength; no edema; no asterixis  	LE: Warm, FROM, no clubbing, intact strength; no edema  	Neuro: No focal deficits, intact gait  	Psych: Normal affect and mood  	Skin: Warm, without rashes  	Vascular access:    LABS/STUDIES  --------------------------------------------------------------------------------              8.1    6.1   >-----------<  283      [02-13-18 @ 23:13]              24.7     138  |  98  |  24  ----------------------------<  179      [02-13-18 @ 23:13]  4.6   |  30  |  2.04        Ca     8.9     [02-13-18 @ 23:13]      Mg     2.4     [02-13-18 @ 23:13]      Phos  5.6     [02-13-18 @ 23:13]    TPro  6.9  /  Alb  2.3  /  TBili  0.5  /  DBili  x   /  AST  53  /  ALT  65  /  AlkPhos  338  [02-13-18 @ 23:13]    PT/INR: PT 10.0 , INR 0.93       [02-13-18 @ 23:13]  PTT: 30.9       [02-13-18 @ 23:13]      Creatinine Trend:  SCr 2.04 [02-13 @ 23:13]  SCr 2.33 [02-12 @ 23:42]  SCr 3.13 [02-11 @ 23:23]  SCr 3.81 [02-10 @ 23:33]  SCr 2.51 [02-09 @ 23:24]    Urinalysis - [01-30-18 @ 12:52]      Color Yellow / Appearance SL Turbid / SG 1.025 / pH 6.0      Gluc 50 / Ketone Negative  / Bili Negative / Urobili Negative       Blood Trace / Protein 150 / Leuk Est Negative / Nitrite Negative      RBC 3-5 / WBC  / Hyaline  / Gran  / Sq Epi  / Non Sq Epi OCC / Bacteria       HbA1c 8.6      [01-31-18 @ 07:15]  Lipid: chol 183, , HDL 10,       [02-07-18 @ 13:34]    HBsAg Nonreact      [02-01-18 @ 22:49]  HCV 0.16, Nonreact      [02-01-18 @ 22:49] [facility] DIVISION OF KIDNEY DISEASES AND HYPERTENSION -- FOLLOW UP NOTE  --------------------------------------------------------------------------------  Chief Complaint: RUSS, cardiac arrest    24 hour events/subjective:  on Bipap overnight      PAST HISTORY  --------------------------------------------------------------------------------  No significant changes to PMH, PSH, FHx, SHx, unless otherwise noted    ALLERGIES & MEDICATIONS  --------------------------------------------------------------------------------  Allergies    NIFEdipine (Urticaria; Hives)  vitamin E (Short breath; Urticaria; Hives)    Intolerances      Standing Inpatient Medications  ALBUTerol/ipratropium for Nebulization 3 milliLiter(s) Nebulizer every 6 hours  aspirin  chewable 81 milliGRAM(s) Oral daily  atorvastatin 40 milliGRAM(s) Oral at bedtime  carvedilol 12.5 milliGRAM(s) Oral every 12 hours  dextrose 5%. 1000 milliLiter(s) IV Continuous <Continuous>  dextrose 5%. 1000 milliLiter(s) IV Continuous <Continuous>  dextrose 50% Injectable 12.5 Gram(s) IV Push once  dextrose 50% Injectable 25 Gram(s) IV Push once  dextrose 50% Injectable 25 Gram(s) IV Push once  dextrose 50% Injectable 12.5 Gram(s) IV Push once  dextrose 50% Injectable 25 Gram(s) IV Push once  dextrose 50% Injectable 25 Gram(s) IV Push once  heparin  Injectable 5000 Unit(s) SubCutaneous every 8 hours  hydrALAZINE 25 milliGRAM(s) Oral every 8 hours  hydrocortisone 1% Cream 1 Application(s) Topical daily  insulin lispro (HumaLOG) corrective regimen sliding scale   SubCutaneous every 6 hours  insulin NPH human recombinant 10 Unit(s) SubCutaneous <User Schedule>  nystatin Powder 1 Application(s) Topical two times a day    PRN Inpatient Medications  chlorhexidine 0.12% Liquid 15 milliLiter(s) Swish and Spit every 4 hours PRN  dextrose Gel 1 Dose(s) Oral once PRN  dextrose Gel 1 Dose(s) Oral once PRN  glucagon  Injectable 1 milliGRAM(s) IntraMuscular once PRN  glucagon  Injectable 1 milliGRAM(s) IntraMuscular once PRN      REVIEW OF SYSTEMS  --------------------------------------------------------------------------------  unable to obtain  VITALS/PHYSICAL EXAM  --------------------------------------------------------------------------------  T(C): 36.8 (02-14-18 @ 07:00), Max: 37.1 (02-14-18 @ 00:00)  HR: 82 (02-14-18 @ 08:00) (76 - 98)  BP: 170/72 (02-14-18 @ 08:00) (87/47 - 170/72)  RR: 24 (02-14-18 @ 08:00) (13 - 27)  SpO2: 99% (02-14-18 @ 08:00) (93% - 100%)  Wt(kg): --        02-13-18 @ 07:01  -  02-14-18 @ 07:00  --------------------------------------------------------  IN: 940 mL / OUT: 2000 mL / NET: -1060 mL    02-14-18 @ 07:01  -  02-14-18 @ 08:34  --------------------------------------------------------  IN: 0 mL / OUT: 0 mL / NET: 0 mL      Physical Exam:  	Gen: NAD,   	Pulm: decreased breath sounds  	CV: RRR, S1S2; no rub  	Abd: +BS, soft, nontender/nondistended  	: No suprapubic tenderness  	UE: Warm,  no edema; no asterixis  	LE: Warm, ; no edema  	Skin: Warm      LABS/STUDIES  --------------------------------------------------------------------------------              8.1    6.1   >-----------<  283      [02-13-18 @ 23:13]              24.7     138  |  98  |  24  ----------------------------<  179      [02-13-18 @ 23:13]  4.6   |  30  |  2.04        Ca     8.9     [02-13-18 @ 23:13]      Mg     2.4     [02-13-18 @ 23:13]      Phos  5.6     [02-13-18 @ 23:13]    TPro  6.9  /  Alb  2.3  /  TBili  0.5  /  DBili  x   /  AST  53  /  ALT  65  /  AlkPhos  338  [02-13-18 @ 23:13]    PT/INR: PT 10.0 , INR 0.93       [02-13-18 @ 23:13]  PTT: 30.9       [02-13-18 @ 23:13]      Creatinine Trend:  SCr 2.04 [02-13 @ 23:13]  SCr 2.33 [02-12 @ 23:42]  SCr 3.13 [02-11 @ 23:23]  SCr 3.81 [02-10 @ 23:33]  SCr 2.51 [02-09 @ 23:24]    Urinalysis - [01-30-18 @ 12:52]      Color Yellow / Appearance SL Turbid / SG 1.025 / pH 6.0      Gluc 50 / Ketone Negative  / Bili Negative / Urobili Negative       Blood Trace / Protein 150 / Leuk Est Negative / Nitrite Negative      RBC 3-5 / WBC  / Hyaline  / Gran  / Sq Epi  / Non Sq Epi OCC / Bacteria       HbA1c 8.6      [01-31-18 @ 07:15]  Lipid: chol 183, , HDL 10,       [02-07-18 @ 13:34]    HBsAg Nonreact      [02-01-18 @ 22:49]  HCV 0.16, Nonreact      [02-01-18 @ 22:49]

## 2018-02-15 LAB
ALBUMIN SERPL ELPH-MCNC: 2.8 G/DL — LOW (ref 3.3–5)
ALP SERPL-CCNC: 357 U/L — HIGH (ref 40–120)
ALT FLD-CCNC: 64 U/L RC — HIGH (ref 10–45)
ANION GAP SERPL CALC-SCNC: 13 MMOL/L — SIGNIFICANT CHANGE UP (ref 5–17)
APTT BLD: 31.8 SEC — SIGNIFICANT CHANGE UP (ref 27.5–37.4)
AST SERPL-CCNC: 41 U/L — HIGH (ref 10–40)
BILIRUB SERPL-MCNC: 0.5 MG/DL — SIGNIFICANT CHANGE UP (ref 0.2–1.2)
BUN SERPL-MCNC: 50 MG/DL — HIGH (ref 7–23)
CALCIUM SERPL-MCNC: 9.3 MG/DL — SIGNIFICANT CHANGE UP (ref 8.4–10.5)
CHLORIDE SERPL-SCNC: 96 MMOL/L — SIGNIFICANT CHANGE UP (ref 96–108)
CO2 SERPL-SCNC: 28 MMOL/L — SIGNIFICANT CHANGE UP (ref 22–31)
CREAT SERPL-MCNC: 3.49 MG/DL — HIGH (ref 0.5–1.3)
GLUCOSE BLDC GLUCOMTR-MCNC: 252 MG/DL — HIGH (ref 70–99)
GLUCOSE BLDC GLUCOMTR-MCNC: 80 MG/DL — SIGNIFICANT CHANGE UP (ref 70–99)
GLUCOSE BLDC GLUCOMTR-MCNC: 91 MG/DL — SIGNIFICANT CHANGE UP (ref 70–99)
GLUCOSE SERPL-MCNC: 199 MG/DL — HIGH (ref 70–99)
HCT VFR BLD CALC: 25.3 % — LOW (ref 34.5–45)
HGB BLD-MCNC: 8.3 G/DL — LOW (ref 11.5–15.5)
INR BLD: 0.88 RATIO — SIGNIFICANT CHANGE UP (ref 0.88–1.16)
MAGNESIUM SERPL-MCNC: 2.8 MG/DL — HIGH (ref 1.6–2.6)
MCHC RBC-ENTMCNC: 30.8 PG — SIGNIFICANT CHANGE UP (ref 27–34)
MCHC RBC-ENTMCNC: 32.8 GM/DL — SIGNIFICANT CHANGE UP (ref 32–36)
MCV RBC AUTO: 93.8 FL — SIGNIFICANT CHANGE UP (ref 80–100)
PHOSPHATE SERPL-MCNC: 7.3 MG/DL — HIGH (ref 2.5–4.5)
PLATELET # BLD AUTO: 266 K/UL — SIGNIFICANT CHANGE UP (ref 150–400)
POTASSIUM SERPL-MCNC: 5.3 MMOL/L — SIGNIFICANT CHANGE UP (ref 3.5–5.3)
POTASSIUM SERPL-SCNC: 5.3 MMOL/L — SIGNIFICANT CHANGE UP (ref 3.5–5.3)
PROT SERPL-MCNC: 6.8 G/DL — SIGNIFICANT CHANGE UP (ref 6–8.3)
PROTHROM AB SERPL-ACNC: 9.6 SEC — LOW (ref 9.8–12.7)
RBC # BLD: 2.7 M/UL — LOW (ref 3.8–5.2)
RBC # FLD: 15.6 % — HIGH (ref 10.3–14.5)
SODIUM SERPL-SCNC: 137 MMOL/L — SIGNIFICANT CHANGE UP (ref 135–145)
VANCOMYCIN FLD-MCNC: <4 UG/ML — SIGNIFICANT CHANGE UP
WBC # BLD: 8.3 K/UL — SIGNIFICANT CHANGE UP (ref 3.8–10.5)
WBC # FLD AUTO: 8.3 K/UL — SIGNIFICANT CHANGE UP (ref 3.8–10.5)

## 2018-02-15 PROCEDURE — 99232 SBSQ HOSP IP/OBS MODERATE 35: CPT | Mod: GC,25

## 2018-02-15 PROCEDURE — 99291 CRITICAL CARE FIRST HOUR: CPT

## 2018-02-15 PROCEDURE — 76705 ECHO EXAM OF ABDOMEN: CPT | Mod: 26,RT

## 2018-02-15 RX ORDER — DIPHENHYDRAMINE HCL 50 MG
25 CAPSULE ORAL ONCE
Qty: 0 | Refills: 0 | Status: DISCONTINUED | OUTPATIENT
Start: 2018-02-15 | End: 2018-02-15

## 2018-02-15 RX ORDER — HYDRALAZINE HCL 50 MG
25 TABLET ORAL EVERY 8 HOURS
Qty: 0 | Refills: 0 | Status: DISCONTINUED | OUTPATIENT
Start: 2018-02-15 | End: 2018-02-21

## 2018-02-15 RX ORDER — CARVEDILOL PHOSPHATE 80 MG/1
12.5 CAPSULE, EXTENDED RELEASE ORAL EVERY 12 HOURS
Qty: 0 | Refills: 0 | Status: DISCONTINUED | OUTPATIENT
Start: 2018-02-15 | End: 2018-02-21

## 2018-02-15 RX ORDER — HYDROCORTISONE 1 %
1 OINTMENT (GRAM) TOPICAL EVERY 6 HOURS
Qty: 0 | Refills: 0 | Status: DISCONTINUED | OUTPATIENT
Start: 2018-02-15 | End: 2018-02-16

## 2018-02-15 RX ADMIN — Medication 3 MILLILITER(S): at 17:06

## 2018-02-15 RX ADMIN — NYSTATIN CREAM 1 APPLICATION(S): 100000 CREAM TOPICAL at 05:33

## 2018-02-15 RX ADMIN — HEPARIN SODIUM 5000 UNIT(S): 5000 INJECTION INTRAVENOUS; SUBCUTANEOUS at 08:38

## 2018-02-15 RX ADMIN — Medication 1 APPLICATION(S): at 06:54

## 2018-02-15 RX ADMIN — HUMAN INSULIN 15 UNIT(S): 100 INJECTION, SUSPENSION SUBCUTANEOUS at 12:09

## 2018-02-15 RX ADMIN — CARVEDILOL PHOSPHATE 12.5 MILLIGRAM(S): 80 CAPSULE, EXTENDED RELEASE ORAL at 05:31

## 2018-02-15 RX ADMIN — Medication 6: at 17:19

## 2018-02-15 RX ADMIN — Medication 81 MILLIGRAM(S): at 11:47

## 2018-02-15 RX ADMIN — Medication 3 MILLILITER(S): at 11:33

## 2018-02-15 RX ADMIN — Medication 1 APPLICATION(S): at 18:06

## 2018-02-15 RX ADMIN — HUMAN INSULIN 15 UNIT(S): 100 INJECTION, SUSPENSION SUBCUTANEOUS at 17:20

## 2018-02-15 RX ADMIN — Medication 1 APPLICATION(S): at 11:47

## 2018-02-15 RX ADMIN — Medication 25 MILLIGRAM(S): at 16:27

## 2018-02-15 RX ADMIN — HEPARIN SODIUM 5000 UNIT(S): 5000 INJECTION INTRAVENOUS; SUBCUTANEOUS at 16:27

## 2018-02-15 RX ADMIN — NYSTATIN CREAM 1 APPLICATION(S): 100000 CREAM TOPICAL at 17:21

## 2018-02-15 RX ADMIN — Medication 1 APPLICATION(S): at 00:19

## 2018-02-15 RX ADMIN — Medication 25 MILLIGRAM(S): at 05:32

## 2018-02-15 RX ADMIN — Medication 3 MILLILITER(S): at 05:31

## 2018-02-15 RX ADMIN — Medication 25 MILLIGRAM(S): at 22:00

## 2018-02-15 RX ADMIN — CARVEDILOL PHOSPHATE 12.5 MILLIGRAM(S): 80 CAPSULE, EXTENDED RELEASE ORAL at 18:07

## 2018-02-15 NOTE — PROGRESS NOTE ADULT - SUBJECTIVE AND OBJECTIVE BOX
Interval Events: Yesterday patient became hypotensive during dialysis with SBP 60's and dialysis was stopped. Patient was started on levophed briefly, but tapered off. statin d/c due to transaminitis     REVIEW OF SYSTEMS:  Constitutional: [x ] negative [ ] fevers [ ] chills [ ] weight loss [ ] weight gain  HEENT: [x ] negative [ ] dry eyes [ ] eye irritation [ ] postnasal drip [ ] nasal congestion  CV: [ x] negative  [ ] chest pain [ ] orthopnea [ ] palpitations [ ] murmur  Resp: [x ] negative [ ] cough [ ] shortness of breath [ ] dyspnea [ ] wheezing [ ] sputum [ ] hemoptysis  GI: [x ] negative [ ] nausea [ ] vomiting [ ] diarrhea [ ] constipation [ ] abd pain [ ] dysphagia   : [ ] negative [ ] dysuria [ ] nocturia [ ] hematuria [ ] increased urinary frequency  Musculoskeletal: [ ] negative [ ] back pain [ ] myalgias [ ] arthralgias [ ] fracture  Skin: [ ] negative [x ] rash [ ] itch  Neurological: [ ] negative [ ] headache [ ] dizziness [ ] syncope [ ] weakness [ ] numbness  Psychiatric: [ ] negative [ ] anxiety [ ] depression  Endocrine: [ ] negative [ ] diabetes [ ] thyroid problem  Hematologic/Lymphatic: [ ] negative [ ] anemia [ ] bleeding problem  Allergic/Immunologic: [ ] negative [ ] itchy eyes [ ] nasal discharge [ ] hives [ ] angioedema  [x ] All other systems negative  [ ] Unable to assess ROS because ________    OBJECTIVE:  ICU Vital Signs Last 24 Hrs  T(C): 36.6 (15 Feb 2018 08:00), Max: 37.1 (14 Feb 2018 18:40)  T(F): 97.8 (15 Feb 2018 08:00), Max: 98.7 (14 Feb 2018 18:40)  HR: 76 (15 Feb 2018 09:00) (70 - 94)  BP: 153/68 (15 Feb 2018 09:00) (65/34 - 184/73)  BP(mean): 98 (15 Feb 2018 09:00) (45 - 126)  ABP: --  ABP(mean): --  RR: 26 (15 Feb 2018 09:00) (12 - 181)  SpO2: 97% (15 Feb 2018 09:00) (92% - 100%)        02-14 @ 07:01  -  02-15 @ 07:00  --------------------------------------------------------  IN: 1314 mL / OUT: 1400 mL / NET: -86 mL      CAPILLARY BLOOD GLUCOSE  91 (15 Feb 2018 05:00)      POCT Blood Glucose.: 91 mg/dL (15 Feb 2018 05:34)      PHYSICAL EXAM:    General: NAD,   HEENT: At/NC  Respiratory: clear to ascultation anteriorly  Cardiovascular: RRR, (+) S1/S2  Abdomen: soft, non-tender, non-distended  Extremities: no clubbing, cyanosis  Skin: clean, dry, intact, rash on chest and arms  Neurological: non-focal  Psychiatry: alert and oriented x1-2    LINES:    HOSPITAL MEDICATIONS:  aspirin  chewable 81 milliGRAM(s) Oral daily  heparin  Injectable 5000 Unit(s) SubCutaneous every 8 hours      carvedilol 12.5 milliGRAM(s) Oral every 12 hours  hydrALAZINE 25 milliGRAM(s) Oral every 8 hours  norepinephrine Infusion 0.1 MICROgram(s)/kG/Min IV Continuous <Continuous>    dextrose 50% Injectable 12.5 Gram(s) IV Push once  dextrose 50% Injectable 25 Gram(s) IV Push once  dextrose 50% Injectable 25 Gram(s) IV Push once  dextrose 50% Injectable 12.5 Gram(s) IV Push once  dextrose 50% Injectable 25 Gram(s) IV Push once  dextrose 50% Injectable 25 Gram(s) IV Push once  dextrose Gel 1 Dose(s) Oral once PRN  dextrose Gel 1 Dose(s) Oral once PRN  glucagon  Injectable 1 milliGRAM(s) IntraMuscular once PRN  glucagon  Injectable 1 milliGRAM(s) IntraMuscular once PRN  insulin lispro (HumaLOG) corrective regimen sliding scale   SubCutaneous every 6 hours  insulin NPH human recombinant 15 Unit(s) SubCutaneous <User Schedule>    ALBUTerol/ipratropium for Nebulization 3 milliLiter(s) Nebulizer every 6 hours            dextrose 5%. 1000 milliLiter(s) IV Continuous <Continuous>  dextrose 5%. 1000 milliLiter(s) IV Continuous <Continuous>      chlorhexidine 0.12% Liquid 15 milliLiter(s) Swish and Spit every 4 hours PRN  hydrocortisone 1% Cream 1 Application(s) Topical daily  hydrocortisone 1% Cream 1 Application(s) Topical every 6 hours PRN  nystatin Powder 1 Application(s) Topical two times a day        LABS:                        8.3    8.3   )-----------( 266      ( 15 Feb 2018 00:19 )             25.3     Hgb Trend: 8.3<--, 8.1<--, 7.7<--, 7.8<--, 8.0<--  02-15    137  |  96  |  50<H>  ----------------------------<  199<H>  5.3   |  28  |  3.49<H>    Ca    9.3      15 Feb 2018 00:19  Phos  7.3     02-15  Mg     2.8     02-15    TPro  6.8  /  Alb  2.8<L>  /  TBili  0.5  /  DBili  x   /  AST  41<H>  /  ALT  64<H>  /  AlkPhos  357<H>  02-15    Creatinine Trend: 3.49<--, 2.04<--, 2.33<--, 3.13<--, 3.81<--, 2.51<--  PT/INR - ( 15 Feb 2018 00:19 )   PT: 9.6 sec;   INR: 0.88 ratio         PTT - ( 15 Feb 2018 00:19 )  PTT:31.8 sec    Arterial Blood Gas:  02-14 @ 20:10  7.34/53/78/28/96/2.4  ABG lactate: --  Arterial Blood Gas:  02-13 @ 23:05  7.40/52/142/32/100/6.6  ABG lactate: --

## 2018-02-15 NOTE — PROGRESS NOTE ADULT - ATTENDING COMMENTS
Agree with above. Patient seen and examined.  -Influenza - s/p treatment now with improvement in respiratory status  -Cardiac arrest - ~35 min in total with ROSC - now no longer in shock  -BIlateral pneumothoraces - all chest tubes removed  -Acute Renal failure after cardiac arrest - had UF yesterday and became hypotensive requiring transient pressor requirements. Patient does not need acute HD today - will start regular regimen.   -Acute Respiratory Distress without hypoxia - patient has a sensation of tachypnea - continue NIPPV as needed. Incentive spirometer and acapella.  -CVA   -Oropharyngeal dysphagia - failed swallow evaluation - continue NGT feeds    Critical Care Time 35 minutes

## 2018-02-15 NOTE — PROGRESS NOTE ADULT - ASSESSMENT
66 y/o F with T2DM uncontrolled on insulin, osteoporosis, HTN, here with acute respiratory failure 2/2 influenza s/p PEA arrest x 2, failed speech and swallow, current on tube feeds

## 2018-02-15 NOTE — CHART NOTE - NSCHARTNOTEFT_GEN_A_CORE
MICU Transfer Note    Transfer from: MICU    Transfer to: (  ) Medicine    (  ) Telemetry     (   ) RCU        (    ) Palliative         (   ) Stroke Unit          (   ) __________________    Accepting physican:      MICU COURSE:  Patient is a 67 year old female with a PMH of breast cancer(diasnosed in 2017) on Herceptin,   found to have T max of 100.6, with initial vitals of 99.5, HR 82, /78, RR 18, satting 93% on room air. She was given ceftriaxone x1, and given 2L normal saline bolus with Duoneb x 3. Patient was also found to be RVP positive for flu. Patient then went into PEA arrest with chest compressions for 10min, received Rosc after Epi x 2, and was intubated. Patient required re-intubation (improperly intubated initially into the esophagus) after she PEA arrested again, with ROSC achieved after 30min with Epi x 5. Patient was also found to have bilateral pneumothoraces, s/p 3 chest tubes, 2 on the right side, and 1 on the left. Patient also has paracentesis drain for decompression of the abdomen.     Patient was admitted to MICU for further management. Patient was found to have acute renal failure and shock liver. Nephrology was consulted and patient was started on dialysis. Patient was started on Tamiflu for flu and zosyn for possible aspiration pneumonia. Patients chest tubes and peritoneal drain were removed. Patient redeveloped a pneumothorax on the L side and chest tube was reinserted. 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 Urine legionella was negative and azithromycin was discontinued. Patient continued to be anuric and continued to receive dialysis. Shock liver resolved. Patient was extubated on 2/8/17. Patient completed a 5 day course of tamiflu and 7 day course of zosyn and did not show signs of infection. Patients pneumothorax on L side resolved and chest tube was removed. Patients mental status improved and was alert and orientedx1-2 and spontaneously moves all extremities. Patient had an NG tube placed pending an official speech and swallow eval. Patient failed speech and swallow an a repeat speech and swallow was planned.     ASSESSMENT & PLAN:             For Followup:  - Needs IR placed permacath  - Repeat Speech and Swallow  - Renal recs for dialysis schedule.       Vital Signs Last 24 Hrs  T(C): 36.5 (15 Feb 2018 12:00), Max: 37.1 (14 Feb 2018 18:40)  T(F): 97.7 (15 Feb 2018 12:00), Max: 98.7 (14 Feb 2018 18:40)  HR: 77 (15 Feb 2018 13:00) (69 - 94)  BP: 172/74 (15 Feb 2018 13:00) (65/34 - 184/73)  BP(mean): 107 (15 Feb 2018 13:00) (45 - 117)  RR: 18 (15 Feb 2018 13:00) (12 - 181)  SpO2: 100% (15 Feb 2018 13:00) (93% - 100%)  I&O's Summary    14 Feb 2018 07:01  -  15 Feb 2018 07:00  --------------------------------------------------------  IN: 1314 mL / OUT: 1400 mL / NET: -86 mL        MEDICATIONS  (STANDING):  ALBUTerol/ipratropium for Nebulization 3 milliLiter(s) Nebulizer every 6 hours  aspirin  chewable 81 milliGRAM(s) Oral daily  carvedilol 12.5 milliGRAM(s) Oral every 12 hours  dextrose 5%. 1000 milliLiter(s) (50 mL/Hr) IV Continuous <Continuous>  dextrose 5%. 1000 milliLiter(s) (50 mL/Hr) IV Continuous <Continuous>  dextrose 50% Injectable 12.5 Gram(s) IV Push once  dextrose 50% Injectable 25 Gram(s) IV Push once  dextrose 50% Injectable 25 Gram(s) IV Push once  dextrose 50% Injectable 12.5 Gram(s) IV Push once  dextrose 50% Injectable 25 Gram(s) IV Push once  dextrose 50% Injectable 25 Gram(s) IV Push once  heparin  Injectable 5000 Unit(s) SubCutaneous every 8 hours  hydrALAZINE 25 milliGRAM(s) Oral every 8 hours  hydrocortisone 1% Cream 1 Application(s) Topical daily  insulin lispro (HumaLOG) corrective regimen sliding scale   SubCutaneous every 6 hours  insulin NPH human recombinant 15 Unit(s) SubCutaneous <User Schedule>  norepinephrine Infusion 0.1 MICROgram(s)/kG/Min (14.025 mL/Hr) IV Continuous <Continuous>  nystatin Powder 1 Application(s) Topical two times a day    MEDICATIONS  (PRN):  chlorhexidine 0.12% Liquid 15 milliLiter(s) Swish and Spit every 4 hours PRN mouth hygiene  dextrose Gel 1 Dose(s) Oral once PRN Blood Glucose LESS THAN 70 milliGRAM(s)/deciliter  dextrose Gel 1 Dose(s) Oral once PRN Blood Glucose LESS THAN 70 milliGRAM(s)/deciliter  glucagon  Injectable 1 milliGRAM(s) IntraMuscular once PRN Glucose LESS THAN 70 milligrams/deciliter  glucagon  Injectable 1 milliGRAM(s) IntraMuscular once PRN Glucose LESS THAN 70 milligrams/deciliter  hydrocortisone 1% Cream 1 Application(s) Topical every 6 hours PRN Itching        LABS                                            8.3                   Neurophils% (auto):   x      (02-15 @ 00:19):    8.3  )-----------(266          Lymphocytes% (auto):  x                                             25.3                   Eosinphils% (auto):   x        Manual%: Neutrophils x    ; Lymphocytes x    ; Eosinophils x    ; Bands%: x    ; Blasts x                                    137    |  96     |  50                  Calcium: 9.3   / iCa: x      (02-15 @ 00:19)    ----------------------------<  199       Magnesium: 2.8                              5.3     |  28     |  3.49             Phosphorous: 7.3      TPro  6.8    /  Alb  2.8    /  TBili  0.5    /  DBili  x      /  AST  41     /  ALT  64     /  AlkPhos  357    15 Feb 2018 00:19    ( 02-15 @ 00:19 )   PT: 9.6 sec;   INR: 0.88 ratio  aPTT: 31.8 sec MICU Transfer Note    Transfer from: MICU    Transfer to: (  ) Medicine    (  ) Telemetry     (   ) RCU        (    ) Palliative         (   ) Stroke Unit          (   ) __________________    Accepting physican:      MICU COURSE:  Patient is a 67 year old female with a PMH of breast cancer(diagnosed in 2017) on Herceptin,   found to have T max of 100.6, with initial vitals of 99.5, HR 82, /78, RR 18, satting 93% on room air. She was given ceftriaxone x1, and given 2L normal saline bolus with Duoneb x 3. Patient was also found to be RVP positive for flu. Patient then went into PEA arrest with chest compressions for 10min, received Rosc after Epi x 2, and was intubated. Patient required re-intubation (improperly intubated initially into the esophagus) after she PEA arrested again, with ROSC achieved after 30min with Epi x 5. Patient was also found to have bilateral pneumothoraces, s/p 3 chest tubes, 2 on the right side, and 1 on the left. Patient also has paracentesis drain for decompression of the abdomen.     Patient was admitted to MICU for further management. Patient was found to have acute renal failure and shock liver. Nephrology was consulted and patient was started on dialysis. Patient was started on Tamiflu for flu and zosyn for possible aspiration pneumonia. Patients chest tubes and peritoneal drain were removed. Patient redeveloped a pneumothorax on the L side and chest tube was reinserted. 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 Urine legionella was negative and azithromycin was discontinued. Patient continued to be anuric and continued to receive dialysis. Shock liver resolved. Patient was extubated on 2/8/17. Patient completed a 5 day course of tamiflu and 7 day course of zosyn and did not show signs of infection. Patients pneumothorax on L side resolved and chest tube was removed. Patients mental status improved and was alert and orientedx1-2 and spontaneously moves all extremities. Patient had an NG tube placed pending an official speech and swallow eval. Patient failed speech and swallow an a repeat speech and swallow was planned. Patient had a RUQ US due to elevated     ASSESSMENT & PLAN:             For Followup:  - Needs IR placed permacath  - Repeat Speech and Swallow  - Renal recs for dialysis schedule.       Vital Signs Last 24 Hrs  T(C): 36.5 (15 Feb 2018 12:00), Max: 37.1 (14 Feb 2018 18:40)  T(F): 97.7 (15 Feb 2018 12:00), Max: 98.7 (14 Feb 2018 18:40)  HR: 77 (15 Feb 2018 13:00) (69 - 94)  BP: 172/74 (15 Feb 2018 13:00) (65/34 - 184/73)  BP(mean): 107 (15 Feb 2018 13:00) (45 - 117)  RR: 18 (15 Feb 2018 13:00) (12 - 181)  SpO2: 100% (15 Feb 2018 13:00) (93% - 100%)  I&O's Summary    14 Feb 2018 07:01  -  15 Feb 2018 07:00  --------------------------------------------------------  IN: 1314 mL / OUT: 1400 mL / NET: -86 mL        MEDICATIONS  (STANDING):  ALBUTerol/ipratropium for Nebulization 3 milliLiter(s) Nebulizer every 6 hours  aspirin  chewable 81 milliGRAM(s) Oral daily  carvedilol 12.5 milliGRAM(s) Oral every 12 hours  dextrose 5%. 1000 milliLiter(s) (50 mL/Hr) IV Continuous <Continuous>  dextrose 5%. 1000 milliLiter(s) (50 mL/Hr) IV Continuous <Continuous>  dextrose 50% Injectable 12.5 Gram(s) IV Push once  dextrose 50% Injectable 25 Gram(s) IV Push once  dextrose 50% Injectable 25 Gram(s) IV Push once  dextrose 50% Injectable 12.5 Gram(s) IV Push once  dextrose 50% Injectable 25 Gram(s) IV Push once  dextrose 50% Injectable 25 Gram(s) IV Push once  heparin  Injectable 5000 Unit(s) SubCutaneous every 8 hours  hydrALAZINE 25 milliGRAM(s) Oral every 8 hours  hydrocortisone 1% Cream 1 Application(s) Topical daily  insulin lispro (HumaLOG) corrective regimen sliding scale   SubCutaneous every 6 hours  insulin NPH human recombinant 15 Unit(s) SubCutaneous <User Schedule>  norepinephrine Infusion 0.1 MICROgram(s)/kG/Min (14.025 mL/Hr) IV Continuous <Continuous>  nystatin Powder 1 Application(s) Topical two times a day    MEDICATIONS  (PRN):  chlorhexidine 0.12% Liquid 15 milliLiter(s) Swish and Spit every 4 hours PRN mouth hygiene  dextrose Gel 1 Dose(s) Oral once PRN Blood Glucose LESS THAN 70 milliGRAM(s)/deciliter  dextrose Gel 1 Dose(s) Oral once PRN Blood Glucose LESS THAN 70 milliGRAM(s)/deciliter  glucagon  Injectable 1 milliGRAM(s) IntraMuscular once PRN Glucose LESS THAN 70 milligrams/deciliter  glucagon  Injectable 1 milliGRAM(s) IntraMuscular once PRN Glucose LESS THAN 70 milligrams/deciliter  hydrocortisone 1% Cream 1 Application(s) Topical every 6 hours PRN Itching        LABS                                            8.3                   Neurophils% (auto):   x      (02-15 @ 00:19):    8.3  )-----------(266          Lymphocytes% (auto):  x                                             25.3                   Eosinphils% (auto):   x        Manual%: Neutrophils x    ; Lymphocytes x    ; Eosinophils x    ; Bands%: x    ; Blasts x                                    137    |  96     |  50                  Calcium: 9.3   / iCa: x      (02-15 @ 00:19)    ----------------------------<  199       Magnesium: 2.8                              5.3     |  28     |  3.49             Phosphorous: 7.3      TPro  6.8    /  Alb  2.8    /  TBili  0.5    /  DBili  x      /  AST  41     /  ALT  64     /  AlkPhos  357    15 Feb 2018 00:19    ( 02-15 @ 00:19 )   PT: 9.6 sec;   INR: 0.88 ratio  aPTT: 31.8 sec MICU Transfer Note    Transfer from: MICU    Transfer to: (  ) Medicine    (  ) Telemetry     (   ) RCU        (    ) Palliative         (   ) Stroke Unit          (   ) __________________    Accepting physican:      MICU COURSE:  Patient is a 67 year old female with a PMH of breast cancer(diagnosed in 2017) on Herceptin,   found to have T max of 100.6, with initial vitals of 99.5, HR 82, /78, RR 18, satting 93% on room air. She was given ceftriaxone x1, and given 2L normal saline bolus with Duoneb x 3. Patient was also found to be RVP positive for flu. Patient then went into PEA arrest with chest compressions for 10min, received Rosc after Epi x 2, and was intubated. Patient required re-intubation (improperly intubated initially into the esophagus) after she PEA arrested again, with ROSC achieved after 30min with Epi x 5. Patient was also found to have bilateral pneumothoraces, s/p 3 chest tubes, 2 on the right side, and 1 on the left. Patient also has paracentesis drain for decompression of the abdomen.     Patient was admitted to MICU for further management. Patient was found to have acute renal failure and shock liver. Nephrology was consulted and patient was started on dialysis. Patient was started on Tamiflu for flu and zosyn for possible aspiration pneumonia. Patients chest tubes and peritoneal drain were removed. Patient redeveloped a pneumothorax on the L side and chest tube was reinserted. 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 Urine legionella was negative and azithromycin was discontinued. Patient continued to be anuric and continued to receive dialysis. Shock liver resolved. Patient was extubated on 2/8/17. Patient completed a 5 day course of tamiflu and 7 day course of zosyn and did not show signs of infection. Patients pneumothorax on L side resolved and chest tube was removed. Patients mental status improved and was alert and orientedx1-2 and spontaneously moves all extremities. Patient had an NG tube placed pending an official speech and swallow eval. Patient failed speech and swallow an a repeat speech and swallow was planned. Patient had a RUQ US due to elevated     ASSESSMENT & PLAN:     67F w PMH of bilateral Breast CA (on Trastuzumab, last dose Jan 20th) admitted to MICU with influenza PNA c/b PEA arrest x 2 with ROSC x 2 (10 min + 30 min). CPR c/b bilateral PTX (s/p bilateral chest tubes), pneumoperitoneum (s/p Peritoneal drain (pigtail) in right hemiabdomen and paracentesis decompression) a/w extensive subcutaneous emphysema. Pt received 100 mg of empiric TPA in ED. Surgery consulted and not recommending surgical intervention.   All 3 chest tubes removed on 1/31. Pt had cardiac MRI on 2/1 which was wnl. Right sided IJ removed and right shiley placed and HD initiated on 2/1. Abdominal drain removed on 2/1.  Patients pneumothorax resolved, and chest tube now out   Neurologic:  MRI of head showed watershed infarcts and L occipital infarct.   Alert and Oriented x1-2      Skin:  Lines: Right IJ Shiley  Tubes: L chest tube   Decubiti: None  Erythematous rash- initially in ECG lead distribution treated with topical hydrocortisone- now on L arm   plan for derm consult     GI:  c/w with NG tube feeds  esophagram no abnormalities  patient failed speech and swallow yesterday   patient has persistently elevated alk phos- RUQ US ordered to assess for billary pathology       Metabolic:  RUSS 2/2 ATN 2/2 PEA arrest  patient had hypotension during HD yesterday- started on levo, now off levo  believe patient could benefit from HD today- but still discuss with renal     Endocrine  c/w current regimen    Volume Assessment:          For Followup:  - Needs IR placed permacath  - Repeat Speech and Swallow  - Renal recs for dialysis schedule.       Vital Signs Last 24 Hrs  T(C): 36.5 (15 Feb 2018 12:00), Max: 37.1 (14 Feb 2018 18:40)  T(F): 97.7 (15 Feb 2018 12:00), Max: 98.7 (14 Feb 2018 18:40)  HR: 77 (15 Feb 2018 13:00) (69 - 94)  BP: 172/74 (15 Feb 2018 13:00) (65/34 - 184/73)  BP(mean): 107 (15 Feb 2018 13:00) (45 - 117)  RR: 18 (15 Feb 2018 13:00) (12 - 181)  SpO2: 100% (15 Feb 2018 13:00) (93% - 100%)  I&O's Summary    14 Feb 2018 07:01  -  15 Feb 2018 07:00  --------------------------------------------------------  IN: 1314 mL / OUT: 1400 mL / NET: -86 mL        MEDICATIONS  (STANDING):  ALBUTerol/ipratropium for Nebulization 3 milliLiter(s) Nebulizer every 6 hours  aspirin  chewable 81 milliGRAM(s) Oral daily  carvedilol 12.5 milliGRAM(s) Oral every 12 hours  dextrose 5%. 1000 milliLiter(s) (50 mL/Hr) IV Continuous <Continuous>  dextrose 5%. 1000 milliLiter(s) (50 mL/Hr) IV Continuous <Continuous>  dextrose 50% Injectable 12.5 Gram(s) IV Push once  dextrose 50% Injectable 25 Gram(s) IV Push once  dextrose 50% Injectable 25 Gram(s) IV Push once  dextrose 50% Injectable 12.5 Gram(s) IV Push once  dextrose 50% Injectable 25 Gram(s) IV Push once  dextrose 50% Injectable 25 Gram(s) IV Push once  heparin  Injectable 5000 Unit(s) SubCutaneous every 8 hours  hydrALAZINE 25 milliGRAM(s) Oral every 8 hours  hydrocortisone 1% Cream 1 Application(s) Topical daily  insulin lispro (HumaLOG) corrective regimen sliding scale   SubCutaneous every 6 hours  insulin NPH human recombinant 15 Unit(s) SubCutaneous <User Schedule>  norepinephrine Infusion 0.1 MICROgram(s)/kG/Min (14.025 mL/Hr) IV Continuous <Continuous>  nystatin Powder 1 Application(s) Topical two times a day    MEDICATIONS  (PRN):  chlorhexidine 0.12% Liquid 15 milliLiter(s) Swish and Spit every 4 hours PRN mouth hygiene  dextrose Gel 1 Dose(s) Oral once PRN Blood Glucose LESS THAN 70 milliGRAM(s)/deciliter  dextrose Gel 1 Dose(s) Oral once PRN Blood Glucose LESS THAN 70 milliGRAM(s)/deciliter  glucagon  Injectable 1 milliGRAM(s) IntraMuscular once PRN Glucose LESS THAN 70 milligrams/deciliter  glucagon  Injectable 1 milliGRAM(s) IntraMuscular once PRN Glucose LESS THAN 70 milligrams/deciliter  hydrocortisone 1% Cream 1 Application(s) Topical every 6 hours PRN Itching        LABS                                            8.3                   Neurophils% (auto):   x      (02-15 @ 00:19):    8.3  )-----------(266          Lymphocytes% (auto):  x                                             25.3                   Eosinphils% (auto):   x        Manual%: Neutrophils x    ; Lymphocytes x    ; Eosinophils x    ; Bands%: x    ; Blasts x                                    137    |  96     |  50                  Calcium: 9.3   / iCa: x      (02-15 @ 00:19)    ----------------------------<  199       Magnesium: 2.8                              5.3     |  28     |  3.49             Phosphorous: 7.3      TPro  6.8    /  Alb  2.8    /  TBili  0.5    /  DBili  x      /  AST  41     /  ALT  64     /  AlkPhos  357    15 Feb 2018 00:19    ( 02-15 @ 00:19 )   PT: 9.6 sec;   INR: 0.88 ratio  aPTT: 31.8 sec MICU Transfer Note    Transfer from: MICU    Transfer to: (  ) Medicine    (  ) Telemetry     (   ) RCU        (    ) Palliative         (   ) Stroke Unit          (   ) __________________    Accepting physican:      MICU COURSE:  Patient is a 67 year old female with a PMH of breast cancer(diagnosed in 2017) on Herceptin,   found to have T max of 100.6, with initial vitals of 99.5, HR 82, /78, RR 18, satting 93% on room air. She was given ceftriaxone x1, and given 2L normal saline bolus with Duoneb x 3. Patient was also found to be RVP positive for flu. Patient then went into PEA arrest with chest compressions for 10min, received Rosc after Epi x 2, and was intubated. Patient required re-intubation (improperly intubated initially into the esophagus) after she PEA arrested again, with ROSC achieved after 30min with Epi x 5. Patient was also found to have bilateral pneumothoraces, s/p 3 chest tubes, 2 on the right side, and 1 on the left. Patient also has paracentesis drain for decompression of the abdomen.     Patient was admitted to MICU for further management. Patient was found to have acute renal failure and shock liver. Nephrology was consulted and patient was started on dialysis. Patient was started on Tamiflu for flu and zosyn for possible aspiration pneumonia. Patients chest tubes and peritoneal drain were removed. Patient redeveloped a pneumothorax on the L side and chest tube was reinserted. 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 Urine legionella was negative and azithromycin was discontinued. Patient continued to be anuric and continued to receive dialysis. Shock liver resolved. Patient was extubated on 2/8/17. Patient completed a 5 day course of tamiflu and 7 day course of zosyn and did not show signs of infection. Patients pneumothorax on L side resolved and chest tube was removed. Patients mental status improved and was alert and orientedx1-2 and spontaneously moves all extremities. Patient had an NG tube placed pending an official speech and swallow eval. Patient failed speech and swallow an a repeat speech and swallow was planned. Patient had a RUQ US due to elevated     ASSESSMENT & PLAN:     67F w PMH of bilateral Breast CA (on Trastuzumab, last dose Jan 20th) admitted to MICU with influenza PNA c/b PEA arrest x 2 with ROSC x 2 (10 min + 30 min). CPR c/b bilateral PTX (s/p bilateral chest tubes), pneumoperitoneum (s/p Peritoneal drain (pigtail) in right hemiabdomen and paracentesis decompression) a/w extensive subcutaneous emphysema. Pt received 100 mg of empiric TPA in ED. Surgery consulted and not recommending surgical intervention.   All 3 chest tubes removed on 1/31. Pt had cardiac MRI on 2/1 which was wnl. Right sided IJ removed and right shiley placed and HD initiated on 2/1. Abdominal drain removed on 2/1.  Patients pneumothorax resolved, and chest tube now out   Neurologic:  MRI of head showed watershed infarcts and L occipital infarct.   Alert and Oriented x1-2      Skin:  Lines: Right IJ Shiley  Tubes: L chest tube   Decubiti: None  Erythematous rash- likely contact derm- hydrocortisone  pending derm recs     GI:  c/w with NG tube feeds  esophagram no abnormalities  patient failed speech and swallow yesterday   patient has persistently elevated alk phos- RUQ US- billiary sludge- no evidence of acute cholecystitis       Metabolic:  RUSS 2/2 ATN 2/2 PEA arrest  patient had hypotension during HD yesterday- started on levo, now off levo  no HD today- proboly will receive HD tomorrow    Endocrine  c/w current regimen    Volume Assessment:  no edema     DVT prophylaxis with HSQ     Infectious Disease:  afebrile without signs of infection    Hemodynamics:  hemodynamically stable off pressors    Cardiovascular:.  Cardiac MRI unremarkable. Shows normal RV and LV function.   c/w coreg 12.5mg BID, hydralazine 25mg PO q8h    Respiratory:  s/p L chest tube- no recurrence of pneumothorax   - patient placed on AVAPS ON for increased work of breathing  now saturating well on RA  c/w duonebs           For Followup:  - Needs IR placed permacath  - Repeat Speech and Swallow  - Renal recs for dialysis schedule.   - derm recs    Vital Signs Last 24 Hrs  T(C): 36.5 (15 Feb 2018 12:00), Max: 37.1 (14 Feb 2018 18:40)  T(F): 97.7 (15 Feb 2018 12:00), Max: 98.7 (14 Feb 2018 18:40)  HR: 77 (15 Feb 2018 13:00) (69 - 94)  BP: 172/74 (15 Feb 2018 13:00) (65/34 - 184/73)  BP(mean): 107 (15 Feb 2018 13:00) (45 - 117)  RR: 18 (15 Feb 2018 13:00) (12 - 181)  SpO2: 100% (15 Feb 2018 13:00) (93% - 100%)  I&O's Summary    14 Feb 2018 07:01  -  15 Feb 2018 07:00  --------------------------------------------------------  IN: 1314 mL / OUT: 1400 mL / NET: -86 mL        MEDICATIONS  (STANDING):  ALBUTerol/ipratropium for Nebulization 3 milliLiter(s) Nebulizer every 6 hours  aspirin  chewable 81 milliGRAM(s) Oral daily  carvedilol 12.5 milliGRAM(s) Oral every 12 hours  dextrose 5%. 1000 milliLiter(s) (50 mL/Hr) IV Continuous <Continuous>  dextrose 5%. 1000 milliLiter(s) (50 mL/Hr) IV Continuous <Continuous>  dextrose 50% Injectable 12.5 Gram(s) IV Push once  dextrose 50% Injectable 25 Gram(s) IV Push once  dextrose 50% Injectable 25 Gram(s) IV Push once  dextrose 50% Injectable 12.5 Gram(s) IV Push once  dextrose 50% Injectable 25 Gram(s) IV Push once  dextrose 50% Injectable 25 Gram(s) IV Push once  heparin  Injectable 5000 Unit(s) SubCutaneous every 8 hours  hydrALAZINE 25 milliGRAM(s) Oral every 8 hours  hydrocortisone 1% Cream 1 Application(s) Topical daily  insulin lispro (HumaLOG) corrective regimen sliding scale   SubCutaneous every 6 hours  insulin NPH human recombinant 15 Unit(s) SubCutaneous <User Schedule>  norepinephrine Infusion 0.1 MICROgram(s)/kG/Min (14.025 mL/Hr) IV Continuous <Continuous>  nystatin Powder 1 Application(s) Topical two times a day    MEDICATIONS  (PRN):  chlorhexidine 0.12% Liquid 15 milliLiter(s) Swish and Spit every 4 hours PRN mouth hygiene  dextrose Gel 1 Dose(s) Oral once PRN Blood Glucose LESS THAN 70 milliGRAM(s)/deciliter  dextrose Gel 1 Dose(s) Oral once PRN Blood Glucose LESS THAN 70 milliGRAM(s)/deciliter  glucagon  Injectable 1 milliGRAM(s) IntraMuscular once PRN Glucose LESS THAN 70 milligrams/deciliter  glucagon  Injectable 1 milliGRAM(s) IntraMuscular once PRN Glucose LESS THAN 70 milligrams/deciliter  hydrocortisone 1% Cream 1 Application(s) Topical every 6 hours PRN Itching        LABS                                            8.3                   Neurophils% (auto):   x      (02-15 @ 00:19):    8.3  )-----------(266          Lymphocytes% (auto):  x                                             25.3                   Eosinphils% (auto):   x        Manual%: Neutrophils x    ; Lymphocytes x    ; Eosinophils x    ; Bands%: x    ; Blasts x                                    137    |  96     |  50                  Calcium: 9.3   / iCa: x      (02-15 @ 00:19)    ----------------------------<  199       Magnesium: 2.8                              5.3     |  28     |  3.49             Phosphorous: 7.3      TPro  6.8    /  Alb  2.8    /  TBili  0.5    /  DBili  x      /  AST  41     /  ALT  64     /  AlkPhos  357    15 Feb 2018 00:19    ( 02-15 @ 00:19 )   PT: 9.6 sec;   INR: 0.88 ratio  aPTT: 31.8 sec MICU Transfer Note    Transfer from: MICU    Transfer to: ( x ) Medicine    (  ) Telemetry     (   ) RCU        (    ) Palliative         (   ) Stroke Unit          (   ) __________________    Accepting physican: Dr. Martinez      MICU COURSE:  Patient is a 67 year old female with a PMH of breast cancer(diagnosed in 2017) on Herceptin,   found to have T max of 100.6, with initial vitals of 99.5, HR 82, /78, RR 18, satting 93% on room air. She was given ceftriaxone x1, and given 2L normal saline bolus with Duoneb x 3. Patient was also found to be RVP positive for flu. Patient then went into PEA arrest with chest compressions for 10min, received Rosc after Epi x 2, and was intubated. Patient required re-intubation (improperly intubated initially into the esophagus) after she PEA arrested again, with ROSC achieved after 30min with Epi x 5. Patient was also found to have bilateral pneumothoraces, s/p 3 chest tubes, 2 on the right side, and 1 on the left. Patient also has paracentesis drain for decompression of the abdomen.     Patient was admitted to MICU for further management. Patient was found to have acute renal failure and shock liver. Nephrology was consulted and patient was started on dialysis. Patient was started on Tamiflu for flu and zosyn for possible aspiration pneumonia. Patients chest tubes and peritoneal drain were removed. Patient redeveloped a pneumothorax on the L side and chest tube was reinserted. 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 Urine legionella was negative and azithromycin was discontinued. Patient continued to be anuric and continued to receive dialysis. Shock liver resolved. Patient was extubated on 2/8/17. Patient completed a 5 day course of tamiflu and 7 day course of zosyn and did not show signs of infection. Patients pneumothorax on L side resolved and chest tube was removed. Patients mental status improved and was alert and orientedx1-2 and spontaneously moves all extremities. Patient had an NG tube placed pending an official speech and swallow eval. Patient failed speech and swallow an a repeat speech and swallow was planned. Patient had a RUQ US due to elevated     ASSESSMENT & PLAN:     67F w PMH of bilateral Breast CA (on Trastuzumab, last dose Jan 20th) admitted to MICU with influenza PNA c/b PEA arrest x 2 with ROSC x 2 (10 min + 30 min). CPR c/b bilateral PTX (s/p bilateral chest tubes), pneumoperitoneum (s/p Peritoneal drain (pigtail) in right hemiabdomen and paracentesis decompression) a/w extensive subcutaneous emphysema. Pt received 100 mg of empiric TPA in ED. Surgery consulted and not recommending surgical intervention.   All 3 chest tubes removed on 1/31. Pt had cardiac MRI on 2/1 which was wnl. Right sided IJ removed and right shiley placed and HD initiated on 2/1. Abdominal drain removed on 2/1.  Patients pneumothorax resolved, and chest tube now out   Neurologic:  MRI of head showed watershed infarcts and L occipital infarct.   Alert and Oriented x1-2      Skin:  Lines: Right IJ Shiley  Tubes: L chest tube   Decubiti: None  Erythematous rash- likely contact derm- hydrocortisone  pending derm recs     GI:  c/w with NG tube feeds  esophagram no abnormalities  patient failed speech and swallow yesterday   patient has persistently elevated alk phos- RUQ US- billiary sludge- no evidence of acute cholecystitis       Metabolic:  RUSS 2/2 ATN 2/2 PEA arrest  patient had hypotension during HD yesterday- started on levo, now off levo  no HD today- proboly will receive HD tomorrow    Endocrine  c/w current regimen    Volume Assessment:  no edema     DVT prophylaxis with HSQ     Infectious Disease:  afebrile without signs of infection    Hemodynamics:  hemodynamically stable off pressors    Cardiovascular:.  Cardiac MRI unremarkable. Shows normal RV and LV function.   c/w coreg 12.5mg BID, hydralazine 25mg PO q8h    Respiratory:  s/p L chest tube- no recurrence of pneumothorax   - patient placed on AVAPS ON for increased work of breathing  now saturating well on RA  c/w duonebs           For Followup:  - Needs IR placed permacath  - Repeat Speech and Swallow  - Renal recs for dialysis schedule.   - derm recs    Vital Signs Last 24 Hrs  T(C): 36.5 (15 Feb 2018 12:00), Max: 37.1 (14 Feb 2018 18:40)  T(F): 97.7 (15 Feb 2018 12:00), Max: 98.7 (14 Feb 2018 18:40)  HR: 77 (15 Feb 2018 13:00) (69 - 94)  BP: 172/74 (15 Feb 2018 13:00) (65/34 - 184/73)  BP(mean): 107 (15 Feb 2018 13:00) (45 - 117)  RR: 18 (15 Feb 2018 13:00) (12 - 181)  SpO2: 100% (15 Feb 2018 13:00) (93% - 100%)  I&O's Summary    14 Feb 2018 07:01  -  15 Feb 2018 07:00  --------------------------------------------------------  IN: 1314 mL / OUT: 1400 mL / NET: -86 mL        MEDICATIONS  (STANDING):  ALBUTerol/ipratropium for Nebulization 3 milliLiter(s) Nebulizer every 6 hours  aspirin  chewable 81 milliGRAM(s) Oral daily  carvedilol 12.5 milliGRAM(s) Oral every 12 hours  dextrose 5%. 1000 milliLiter(s) (50 mL/Hr) IV Continuous <Continuous>  dextrose 5%. 1000 milliLiter(s) (50 mL/Hr) IV Continuous <Continuous>  dextrose 50% Injectable 12.5 Gram(s) IV Push once  dextrose 50% Injectable 25 Gram(s) IV Push once  dextrose 50% Injectable 25 Gram(s) IV Push once  dextrose 50% Injectable 12.5 Gram(s) IV Push once  dextrose 50% Injectable 25 Gram(s) IV Push once  dextrose 50% Injectable 25 Gram(s) IV Push once  heparin  Injectable 5000 Unit(s) SubCutaneous every 8 hours  hydrALAZINE 25 milliGRAM(s) Oral every 8 hours  hydrocortisone 1% Cream 1 Application(s) Topical daily  insulin lispro (HumaLOG) corrective regimen sliding scale   SubCutaneous every 6 hours  insulin NPH human recombinant 15 Unit(s) SubCutaneous <User Schedule>  norepinephrine Infusion 0.1 MICROgram(s)/kG/Min (14.025 mL/Hr) IV Continuous <Continuous>  nystatin Powder 1 Application(s) Topical two times a day    MEDICATIONS  (PRN):  chlorhexidine 0.12% Liquid 15 milliLiter(s) Swish and Spit every 4 hours PRN mouth hygiene  dextrose Gel 1 Dose(s) Oral once PRN Blood Glucose LESS THAN 70 milliGRAM(s)/deciliter  dextrose Gel 1 Dose(s) Oral once PRN Blood Glucose LESS THAN 70 milliGRAM(s)/deciliter  glucagon  Injectable 1 milliGRAM(s) IntraMuscular once PRN Glucose LESS THAN 70 milligrams/deciliter  glucagon  Injectable 1 milliGRAM(s) IntraMuscular once PRN Glucose LESS THAN 70 milligrams/deciliter  hydrocortisone 1% Cream 1 Application(s) Topical every 6 hours PRN Itching        LABS                                            8.3                   Neurophils% (auto):   x      (02-15 @ 00:19):    8.3  )-----------(266          Lymphocytes% (auto):  x                                             25.3                   Eosinphils% (auto):   x        Manual%: Neutrophils x    ; Lymphocytes x    ; Eosinophils x    ; Bands%: x    ; Blasts x                                    137    |  96     |  50                  Calcium: 9.3   / iCa: x      (02-15 @ 00:19)    ----------------------------<  199       Magnesium: 2.8                              5.3     |  28     |  3.49             Phosphorous: 7.3      TPro  6.8    /  Alb  2.8    /  TBili  0.5    /  DBili  x      /  AST  41     /  ALT  64     /  AlkPhos  357    15 Feb 2018 00:19    ( 02-15 @ 00:19 )   PT: 9.6 sec;   INR: 0.88 ratio  aPTT: 31.8 sec

## 2018-02-15 NOTE — PROGRESS NOTE ADULT - PROBLEM SELECTOR PLAN 1
- patient currently on NPH 15 units at 12 am, 12 pm, and 6 pm. Patient continues to receive NPH while tube feeds are held. Please HOLD NPH when tube feeds are held.  -Check bs q6  -POC q6  - for now, c/w NPH 15 units q6 when feeds are on. Please coordinate NPH dose with feeds.

## 2018-02-15 NOTE — PROGRESS NOTE ADULT - SUBJECTIVE AND OBJECTIVE BOX
[facility] DIVISION OF KIDNEY DISEASES AND HYPERTENSION -- FOLLOW UP NOTE  --------------------------------------------------------------------------------  Chief Complaint: Daysi, s/p cardiac arrest    24 hour events/subjective:  became hypotensive after PUF session.  BP stable this AM.  Pt alert and awake.      PAST HISTORY  --------------------------------------------------------------------------------  No significant changes to PMH, PSH, FHx, SHx, unless otherwise noted    ALLERGIES & MEDICATIONS  --------------------------------------------------------------------------------  Allergies    doxycycline (Rash)  isoniazid (Rash)  NIFEdipine (Urticaria; Hives)  vitamin E (Short breath; Urticaria; Hives)    Intolerances      Standing Inpatient Medications  ALBUTerol/ipratropium for Nebulization 3 milliLiter(s) Nebulizer every 6 hours  aspirin  chewable 81 milliGRAM(s) Oral daily  carvedilol 12.5 milliGRAM(s) Oral every 12 hours  dextrose 5%. 1000 milliLiter(s) IV Continuous <Continuous>  dextrose 5%. 1000 milliLiter(s) IV Continuous <Continuous>  dextrose 50% Injectable 12.5 Gram(s) IV Push once  dextrose 50% Injectable 25 Gram(s) IV Push once  dextrose 50% Injectable 25 Gram(s) IV Push once  dextrose 50% Injectable 12.5 Gram(s) IV Push once  dextrose 50% Injectable 25 Gram(s) IV Push once  dextrose 50% Injectable 25 Gram(s) IV Push once  heparin  Injectable 5000 Unit(s) SubCutaneous every 8 hours  hydrALAZINE 25 milliGRAM(s) Oral every 8 hours  hydrocortisone 1% Cream 1 Application(s) Topical daily  insulin lispro (HumaLOG) corrective regimen sliding scale   SubCutaneous every 6 hours  insulin NPH human recombinant 15 Unit(s) SubCutaneous <User Schedule>  norepinephrine Infusion 0.1 MICROgram(s)/kG/Min IV Continuous <Continuous>  nystatin Powder 1 Application(s) Topical two times a day    PRN Inpatient Medications  chlorhexidine 0.12% Liquid 15 milliLiter(s) Swish and Spit every 4 hours PRN  dextrose Gel 1 Dose(s) Oral once PRN  dextrose Gel 1 Dose(s) Oral once PRN  glucagon  Injectable 1 milliGRAM(s) IntraMuscular once PRN  glucagon  Injectable 1 milliGRAM(s) IntraMuscular once PRN  hydrocortisone 1% Cream 1 Application(s) Topical every 6 hours PRN      REVIEW OF SYSTEMS  --------------------------------------------------------------------------------  Gen: No weight changes, fatigue, fevers/chills, weakness  Skin: No rashes  Head/Eyes/Ears/Mouth: No headache; Normal hearing; Normal vision w/o blurriness; No sinus pain/discomfort, sore throat  Respiratory: No dyspnea, cough, wheezing, hemoptysis  CV: No chest pain, PND, orthopnea  GI: No abdominal pain, diarrhea, constipation, nausea, vomiting, melena, hematochezia  : No increased frequency, dysuria, hematuria, nocturia  MSK: No joint pain/swelling; no back pain; no edema  Neuro: No dizziness/lightheadedness, weakness, seizures, numbness, tingling  Heme: No easy bruising or bleeding  Endo: No heat/cold intolerance  Psych: No significant nervousness, anxiety, stress, depression    All other systems were reviewed and are negative, except as noted.    VITALS/PHYSICAL EXAM  --------------------------------------------------------------------------------  T(C): 36.6 (02-15-18 @ 08:00), Max: 37.1 (02-14-18 @ 18:40)  HR: 79 (02-15-18 @ 10:00) (70 - 94)  BP: 176/74 (02-15-18 @ 10:00) (65/34 - 184/73)  RR: 26 (02-15-18 @ 10:00) (12 - 181)  SpO2: 97% (02-15-18 @ 10:00) (92% - 100%)  Wt(kg): --        02-14-18 @ 07:01  -  02-15-18 @ 07:00  --------------------------------------------------------  IN: 1314 mL / OUT: 1400 mL / NET: -86 mL      Physical Exam:  	Gen: NAD  	Pulm: coarse breath sounds  	CV: RRR, S1S2; no rub  	Abd: +BS, soft, nontender/nondistended  	: No suprapubic tenderness  	UE: Warm no edema;  	LE: Warm, ; no edema  	Skin: Warm  	Vascular access: RIJ non tunneled catheter    LABS/STUDIES  --------------------------------------------------------------------------------              8.3    8.3   >-----------<  266      [02-15-18 @ 00:19]              25.3     137  |  96  |  50  ----------------------------<  199      [02-15-18 @ 00:19]  5.3   |  28  |  3.49        Ca     9.3     [02-15-18 @ 00:19]      Mg     2.8     [02-15-18 @ 00:19]      Phos  7.3     [02-15-18 @ 00:19]    TPro  6.8  /  Alb  2.8  /  TBili  0.5  /  DBili  x   /  AST  41  /  ALT  64  /  AlkPhos  357  [02-15-18 @ 00:19]    PT/INR: PT 9.6  , INR 0.88       [02-15-18 @ 00:19]  PTT: 31.8       [02-15-18 @ 00:19]      Creatinine Trend:  SCr 3.49 [02-15 @ 00:19]  SCr 2.04 [02-13 @ 23:13]  SCr 2.33 [02-12 @ 23:42]  SCr 3.13 [02-11 @ 23:23]  SCr 3.81 [02-10 @ 23:33]    Urinalysis - [01-30-18 @ 12:52]      Color Yellow / Appearance SL Turbid / SG 1.025 / pH 6.0      Gluc 50 / Ketone Negative  / Bili Negative / Urobili Negative       Blood Trace / Protein 150 / Leuk Est Negative / Nitrite Negative      RBC 3-5 / WBC  / Hyaline  / Gran  / Sq Epi  / Non Sq Epi OCC / Bacteria       HbA1c 8.6      [01-31-18 @ 07:15]  Lipid: chol 183, , HDL 10,       [02-07-18 @ 13:34]    HBsAg Nonreact      [02-01-18 @ 22:49]  HCV 0.16, Nonreact      [02-01-18 @ 22:49]

## 2018-02-15 NOTE — PROGRESS NOTE ADULT - SUBJECTIVE AND OBJECTIVE BOX
Chief Complaint: f/u DM2    History:  Patient is arousable at bedside, denies pain. Minimally verbal. Appears comfortable. On tube feeds at goal rate of 80 cc/hr. Tube feeds held at 8 pm yesterday evening, appears it was restarted this afternoon.    MEDICATIONS  (STANDING):  ALBUTerol/ipratropium for Nebulization 3 milliLiter(s) Nebulizer every 6 hours  aspirin  chewable 81 milliGRAM(s) Oral daily  carvedilol 12.5 milliGRAM(s) Oral every 12 hours  dextrose 5%. 1000 milliLiter(s) (50 mL/Hr) IV Continuous <Continuous>  dextrose 5%. 1000 milliLiter(s) (50 mL/Hr) IV Continuous <Continuous>  dextrose 50% Injectable 12.5 Gram(s) IV Push once  dextrose 50% Injectable 25 Gram(s) IV Push once  dextrose 50% Injectable 25 Gram(s) IV Push once  dextrose 50% Injectable 12.5 Gram(s) IV Push once  dextrose 50% Injectable 25 Gram(s) IV Push once  dextrose 50% Injectable 25 Gram(s) IV Push once  heparin  Injectable 5000 Unit(s) SubCutaneous every 8 hours  hydrALAZINE 25 milliGRAM(s) Oral every 8 hours  hydrocortisone 1% Cream 1 Application(s) Topical daily  insulin lispro (HumaLOG) corrective regimen sliding scale   SubCutaneous every 6 hours  insulin NPH human recombinant 15 Unit(s) SubCutaneous <User Schedule>  norepinephrine Infusion 0.1 MICROgram(s)/kG/Min (14.025 mL/Hr) IV Continuous <Continuous>  nystatin Powder 1 Application(s) Topical two times a day    MEDICATIONS  (PRN):  chlorhexidine 0.12% Liquid 15 milliLiter(s) Swish and Spit every 4 hours PRN mouth hygiene  dextrose Gel 1 Dose(s) Oral once PRN Blood Glucose LESS THAN 70 milliGRAM(s)/deciliter  dextrose Gel 1 Dose(s) Oral once PRN Blood Glucose LESS THAN 70 milliGRAM(s)/deciliter  glucagon  Injectable 1 milliGRAM(s) IntraMuscular once PRN Glucose LESS THAN 70 milligrams/deciliter  glucagon  Injectable 1 milliGRAM(s) IntraMuscular once PRN Glucose LESS THAN 70 milligrams/deciliter  hydrocortisone 1% Cream 1 Application(s) Topical every 6 hours PRN Itching      Allergies    doxycycline (Rash)  isoniazid (Rash)  NIFEdipine (Urticaria; Hives)  vitamin E (Short breath; Urticaria; Hives)    Review of Systems:  UNABLE TO OBTAIN - patient is minimally verbal at beside    PHYSICAL EXAM:  VITALS: T(C): 36.5 (02-15-18 @ 12:00)  T(F): 97.7 (02-15-18 @ 12:00), Max: 98.7 (02-14-18 @ 18:40)  HR: 77 (02-15-18 @ 13:00) (69 - 94)  BP: 172/74 (02-15-18 @ 13:00) (65/34 - 184/73)  RR:  (12 - 181)  SpO2:  (92% - 100%)  Wt(kg): --  GENERAL: NAD, well-developed  EYES: No proptosis, anicteric  HEENT:  Atraumatic, Normocephalic  RESPIRATORY: Clear to auscultation bilaterally; No rales, rhonchi, wheezing, or rubs  CARDIOVASCULAR: Regular rate and rhythm; No murmurs  GI: Soft, nontender, non distended  SKIN: Dry, intact    POCT Blood Glucose.: 80 mg/dL (02-15-18 @ 11:59) NPH 15  POCT Blood Glucose.: 91 mg/dL (02-15-18 @ 05:34)  POCT Blood Glucose.: 199 mg/dL (02-14-18 @ 23:32) NPH 15, 2  POCT Blood Glucose.: 235 mg/dL (02-14-18 @ 17:46) NPH 15, 4  POCT Blood Glucose.: 235 mg/dL (02-14-18 @ 11:48)  POCT Blood Glucose.: 325 mg/dL (02-14-18 @ 05:05)  POCT Blood Glucose.: 141 mg/dL (02-13-18 @ 17:15)  POCT Blood Glucose.: 94 mg/dL (02-13-18 @ 11:49)  POCT Blood Glucose.: 183 mg/dL (02-13-18 @ 05:02)  POCT Blood Glucose.: 131 mg/dL (02-12-18 @ 17:15)      02-15    137  |  96  |  50<H>  ----------------------------<  199<H>  5.3   |  28  |  3.49<H>    EGFR if : 15<L>  EGFR if non : 13<L>    Ca    9.3      02-15  Mg     2.8     02-15  Phos  7.3     02-15    TPro  6.8  /  Alb  2.8<L>  /  TBili  0.5  /  DBili  x   /  AST  41<H>  /  ALT  64<H>  /  AlkPhos  357<H>  02-15          Thyroid Function Tests:      Hemoglobin A1C, Whole Blood: 8.6 % <H> [4.0 - 5.6] (01-31-18 @ 07:15)  Hemoglobin A1C, Whole Blood: 8.7 % <H> [4.0 - 5.6] (01-31-18 @ 05:51)

## 2018-02-15 NOTE — PROGRESS NOTE ADULT - PROBLEM SELECTOR PLAN 1
likely from ATN in setting of cardiac arrest. Pt continues to be oligo anuric.  s/p HD on Monday and Tuesday. PUF yesterday with 1 L UF and pt became hypotensive. BP stable this AM.  No electrolyte disturbances. Hold off on HD/PUF today and re evaluate tomorrow. Pt needs permanent HD catheter.

## 2018-02-15 NOTE — PROGRESS NOTE ADULT - ATTENDING COMMENTS
Agree with assessment and plan as above by Dr. English. Reviewed all pertinent labs, glucose values, and imaging studies. Modifications made as indicated above.     Andi Low D.O  [phone]

## 2018-02-15 NOTE — PROGRESS NOTE ADULT - ASSESSMENT
67F w PMH of bilateral Breast CA (on Trastuzumab, last dose Jan 20th) admitted to MICU with influenza PNA c/b PEA arrest x 2 with ROSC x 2 (10 min + 30 min). CPR c/b bilateral PTX (s/p bilateral chest tubes), pneumoperitoneum (s/p Peritoneal drain (pigtail) in right hemiabdomen and paracentesis decompression) a/w extensive subcutaneous emphysema. Pt received 100 mg of empiric TPA in ED. Surgery consulted and not recommending surgical intervention.   All 3 chest tubes removed on 1/31. Pt had cardiac MRI on 2/1 which was wnl. Right sided IJ removed and right shiley placed and HD initiated on 2/1. Abdominal drain removed on 2/1.  Patients pneumothorax resolved, and chest tube now out   Neurologic:  MRI of head showed watershed infarcts and L occipital infarct.   Alert and Oriented x1-2      Skin:  Lines: Right IJ Shiley  Tubes: L chest tube   Decubiti: None  Erythematous rash- initially in ECG lead distribution treated with topical hydrocortisone- now on L arm   plan for derm consult     GI:  c/w with NG tube feeds  esophagram no abnormalities  patient failed speech and swallow yesterday   patient has persistently elevated alk phos- RUQ US ordered to assess for billary pathology       Metabolic:  RUSS 2/2 ATN 2/2 PEA arrest  patient had hypotension during HD yesterday- started on levo, now off levo  believe patient could benefit from HD today- but still discuss with renal     Endocrine  c/w current regimen    Volume Assessment:  no edema     DVT prophylaxis with HSQ     Infectious Disease:  afebrile without signs of infection    Hemodynamics:  hemodynamically stable off pressors    Cardiovascular:.  Cardiac MRI unremarkable. Shows normal RV and LV function.   c/w coreg 12.5mg BID, hydralazine 25mg PO q8h    Respiratory:  L chest tube now removed  - patient placed on AVAPS ON for increased work of breathing  now saturating well on RA  c/w matilde

## 2018-02-16 DIAGNOSIS — Z29.9 ENCOUNTER FOR PROPHYLACTIC MEASURES, UNSPECIFIED: ICD-10-CM

## 2018-02-16 DIAGNOSIS — I63.9 CEREBRAL INFARCTION, UNSPECIFIED: ICD-10-CM

## 2018-02-16 DIAGNOSIS — J96.01 ACUTE RESPIRATORY FAILURE WITH HYPOXIA: ICD-10-CM

## 2018-02-16 DIAGNOSIS — J93.9 PNEUMOTHORAX, UNSPECIFIED: ICD-10-CM

## 2018-02-16 DIAGNOSIS — L74.0 MILIARIA RUBRA: ICD-10-CM

## 2018-02-16 DIAGNOSIS — I46.9 CARDIAC ARREST, CAUSE UNSPECIFIED: ICD-10-CM

## 2018-02-16 DIAGNOSIS — L23.9 ALLERGIC CONTACT DERMATITIS, UNSPECIFIED CAUSE: ICD-10-CM

## 2018-02-16 DIAGNOSIS — L30.9 DERMATITIS, UNSPECIFIED: ICD-10-CM

## 2018-02-16 DIAGNOSIS — C50.919 MALIGNANT NEOPLASM OF UNSPECIFIED SITE OF UNSPECIFIED FEMALE BREAST: ICD-10-CM

## 2018-02-16 LAB
ALBUMIN SERPL ELPH-MCNC: 2.6 G/DL — LOW (ref 3.3–5)
ALBUMIN SERPL ELPH-MCNC: 2.7 G/DL — LOW (ref 3.3–5)
ALP SERPL-CCNC: 439 U/L — HIGH (ref 40–120)
ALP SERPL-CCNC: 445 U/L — HIGH (ref 40–120)
ALT FLD-CCNC: 70 U/L RC — HIGH (ref 10–45)
ALT FLD-CCNC: 77 U/L RC — HIGH (ref 10–45)
ANION GAP SERPL CALC-SCNC: 12 MMOL/L — SIGNIFICANT CHANGE UP (ref 5–17)
ANION GAP SERPL CALC-SCNC: 17 MMOL/L — SIGNIFICANT CHANGE UP (ref 5–17)
ANION GAP SERPL CALC-SCNC: 17 MMOL/L — SIGNIFICANT CHANGE UP (ref 5–17)
APTT BLD: 24.9 SEC — LOW (ref 27.5–37.4)
AST SERPL-CCNC: 58 U/L — HIGH (ref 10–40)
AST SERPL-CCNC: 61 U/L — HIGH (ref 10–40)
BASOPHILS # BLD AUTO: 0 K/UL — SIGNIFICANT CHANGE UP (ref 0–0.2)
BASOPHILS NFR BLD AUTO: 0.4 % — SIGNIFICANT CHANGE UP (ref 0–2)
BILIRUB SERPL-MCNC: 0.4 MG/DL — SIGNIFICANT CHANGE UP (ref 0.2–1.2)
BILIRUB SERPL-MCNC: 0.4 MG/DL — SIGNIFICANT CHANGE UP (ref 0.2–1.2)
BUN SERPL-MCNC: 51 MG/DL — HIGH (ref 7–23)
BUN SERPL-MCNC: 82 MG/DL — HIGH (ref 7–23)
BUN SERPL-MCNC: 84 MG/DL — HIGH (ref 7–23)
CALCIUM SERPL-MCNC: 8.3 MG/DL — LOW (ref 8.4–10.5)
CALCIUM SERPL-MCNC: 8.7 MG/DL — SIGNIFICANT CHANGE UP (ref 8.4–10.5)
CALCIUM SERPL-MCNC: 9 MG/DL — SIGNIFICANT CHANGE UP (ref 8.4–10.5)
CHLORIDE SERPL-SCNC: 93 MMOL/L — LOW (ref 96–108)
CHLORIDE SERPL-SCNC: 95 MMOL/L — LOW (ref 96–108)
CHLORIDE SERPL-SCNC: 96 MMOL/L — SIGNIFICANT CHANGE UP (ref 96–108)
CK MB CFR SERPL CALC: 2 NG/ML — SIGNIFICANT CHANGE UP (ref 0–3.8)
CK SERPL-CCNC: 15 U/L — LOW (ref 25–170)
CO2 SERPL-SCNC: 25 MMOL/L — SIGNIFICANT CHANGE UP (ref 22–31)
CO2 SERPL-SCNC: 25 MMOL/L — SIGNIFICANT CHANGE UP (ref 22–31)
CO2 SERPL-SCNC: 27 MMOL/L — SIGNIFICANT CHANGE UP (ref 22–31)
CREAT SERPL-MCNC: 3.51 MG/DL — HIGH (ref 0.5–1.3)
CREAT SERPL-MCNC: 5.27 MG/DL — HIGH (ref 0.5–1.3)
CREAT SERPL-MCNC: 5.47 MG/DL — HIGH (ref 0.5–1.3)
EOSINOPHIL # BLD AUTO: 0.3 K/UL — SIGNIFICANT CHANGE UP (ref 0–0.5)
EOSINOPHIL NFR BLD AUTO: 4.4 % — SIGNIFICANT CHANGE UP (ref 0–6)
GAS PNL BLDA: SIGNIFICANT CHANGE UP
GLUCOSE BLDC GLUCOMTR-MCNC: 109 MG/DL — HIGH (ref 70–99)
GLUCOSE BLDC GLUCOMTR-MCNC: 138 MG/DL — HIGH (ref 70–99)
GLUCOSE BLDC GLUCOMTR-MCNC: 230 MG/DL — HIGH (ref 70–99)
GLUCOSE BLDC GLUCOMTR-MCNC: 274 MG/DL — HIGH (ref 70–99)
GLUCOSE BLDC GLUCOMTR-MCNC: 347 MG/DL — HIGH (ref 70–99)
GLUCOSE SERPL-MCNC: 178 MG/DL — HIGH (ref 70–99)
GLUCOSE SERPL-MCNC: 178 MG/DL — HIGH (ref 70–99)
GLUCOSE SERPL-MCNC: 229 MG/DL — HIGH (ref 70–99)
HCT VFR BLD CALC: 23.2 % — LOW (ref 34.5–45)
HCT VFR BLD CALC: 23.6 % — LOW (ref 34.5–45)
HCT VFR BLD CALC: 23.8 % — LOW (ref 34.5–45)
HGB BLD-MCNC: 7.5 G/DL — LOW (ref 11.5–15.5)
HGB BLD-MCNC: 7.7 G/DL — LOW (ref 11.5–15.5)
HGB BLD-MCNC: 7.7 G/DL — LOW (ref 11.5–15.5)
INR BLD: 0.89 RATIO — SIGNIFICANT CHANGE UP (ref 0.88–1.16)
LYMPHOCYTES # BLD AUTO: 0.5 K/UL — LOW (ref 1–3.3)
LYMPHOCYTES # BLD AUTO: 7 % — LOW (ref 13–44)
MAGNESIUM SERPL-MCNC: 2.5 MG/DL — SIGNIFICANT CHANGE UP (ref 1.6–2.6)
MAGNESIUM SERPL-MCNC: 3.3 MG/DL — HIGH (ref 1.6–2.6)
MCHC RBC-ENTMCNC: 30.2 PG — SIGNIFICANT CHANGE UP (ref 27–34)
MCHC RBC-ENTMCNC: 30.2 PG — SIGNIFICANT CHANGE UP (ref 27–34)
MCHC RBC-ENTMCNC: 30.6 PG — SIGNIFICANT CHANGE UP (ref 27–34)
MCHC RBC-ENTMCNC: 32.2 GM/DL — SIGNIFICANT CHANGE UP (ref 32–36)
MCHC RBC-ENTMCNC: 32.2 GM/DL — SIGNIFICANT CHANGE UP (ref 32–36)
MCHC RBC-ENTMCNC: 32.7 GM/DL — SIGNIFICANT CHANGE UP (ref 32–36)
MCV RBC AUTO: 93.6 FL — SIGNIFICANT CHANGE UP (ref 80–100)
MCV RBC AUTO: 93.6 FL — SIGNIFICANT CHANGE UP (ref 80–100)
MCV RBC AUTO: 93.9 FL — SIGNIFICANT CHANGE UP (ref 80–100)
MONOCYTES # BLD AUTO: 0.6 K/UL — SIGNIFICANT CHANGE UP (ref 0–0.9)
MONOCYTES NFR BLD AUTO: 9 % — SIGNIFICANT CHANGE UP (ref 2–14)
NEUTROPHILS # BLD AUTO: 5.6 K/UL — SIGNIFICANT CHANGE UP (ref 1.8–7.4)
NEUTROPHILS NFR BLD AUTO: 79.2 % — HIGH (ref 43–77)
PHOSPHATE SERPL-MCNC: 5.2 MG/DL — HIGH (ref 2.5–4.5)
PHOSPHATE SERPL-MCNC: 8.4 MG/DL — HIGH (ref 2.5–4.5)
PLATELET # BLD AUTO: 208 K/UL — SIGNIFICANT CHANGE UP (ref 150–400)
PLATELET # BLD AUTO: 213 K/UL — SIGNIFICANT CHANGE UP (ref 150–400)
PLATELET # BLD AUTO: 216 K/UL — SIGNIFICANT CHANGE UP (ref 150–400)
POTASSIUM SERPL-MCNC: 4.3 MMOL/L — SIGNIFICANT CHANGE UP (ref 3.5–5.3)
POTASSIUM SERPL-MCNC: 6.1 MMOL/L — HIGH (ref 3.5–5.3)
POTASSIUM SERPL-MCNC: 6.5 MMOL/L — CRITICAL HIGH (ref 3.5–5.3)
POTASSIUM SERPL-SCNC: 4.3 MMOL/L — SIGNIFICANT CHANGE UP (ref 3.5–5.3)
POTASSIUM SERPL-SCNC: 6.1 MMOL/L — HIGH (ref 3.5–5.3)
POTASSIUM SERPL-SCNC: 6.5 MMOL/L — CRITICAL HIGH (ref 3.5–5.3)
PROT SERPL-MCNC: 6.5 G/DL — SIGNIFICANT CHANGE UP (ref 6–8.3)
PROT SERPL-MCNC: 6.9 G/DL — SIGNIFICANT CHANGE UP (ref 6–8.3)
PROTHROM AB SERPL-ACNC: 9.7 SEC — LOW (ref 9.8–12.7)
RBC # BLD: 2.47 M/UL — LOW (ref 3.8–5.2)
RBC # BLD: 2.52 M/UL — LOW (ref 3.8–5.2)
RBC # BLD: 2.55 M/UL — LOW (ref 3.8–5.2)
RBC # FLD: 16 % — HIGH (ref 10.3–14.5)
RBC # FLD: 16.1 % — HIGH (ref 10.3–14.5)
RBC # FLD: 16.2 % — HIGH (ref 10.3–14.5)
SODIUM SERPL-SCNC: 135 MMOL/L — SIGNIFICANT CHANGE UP (ref 135–145)
SODIUM SERPL-SCNC: 135 MMOL/L — SIGNIFICANT CHANGE UP (ref 135–145)
SODIUM SERPL-SCNC: 137 MMOL/L — SIGNIFICANT CHANGE UP (ref 135–145)
TROPONIN T SERPL-MCNC: 0.03 NG/ML — SIGNIFICANT CHANGE UP (ref 0–0.06)
WBC # BLD: 6.6 K/UL — SIGNIFICANT CHANGE UP (ref 3.8–10.5)
WBC # BLD: 7.1 K/UL — SIGNIFICANT CHANGE UP (ref 3.8–10.5)
WBC # BLD: 7.2 K/UL — SIGNIFICANT CHANGE UP (ref 3.8–10.5)
WBC # FLD AUTO: 6.6 K/UL — SIGNIFICANT CHANGE UP (ref 3.8–10.5)
WBC # FLD AUTO: 7.1 K/UL — SIGNIFICANT CHANGE UP (ref 3.8–10.5)
WBC # FLD AUTO: 7.2 K/UL — SIGNIFICANT CHANGE UP (ref 3.8–10.5)

## 2018-02-16 PROCEDURE — 99232 SBSQ HOSP IP/OBS MODERATE 35: CPT | Mod: GC

## 2018-02-16 PROCEDURE — 99223 1ST HOSP IP/OBS HIGH 75: CPT | Mod: GC

## 2018-02-16 PROCEDURE — 71045 X-RAY EXAM CHEST 1 VIEW: CPT | Mod: 26

## 2018-02-16 PROCEDURE — 90935 HEMODIALYSIS ONE EVALUATION: CPT | Mod: GC

## 2018-02-16 RX ORDER — DIPHENHYDRAMINE HCL 50 MG
12.5 CAPSULE ORAL ONCE
Qty: 0 | Refills: 0 | Status: COMPLETED | OUTPATIENT
Start: 2018-02-16 | End: 2018-02-16

## 2018-02-16 RX ORDER — HUMAN INSULIN 100 [IU]/ML
20 INJECTION, SUSPENSION SUBCUTANEOUS
Qty: 0 | Refills: 0 | Status: DISCONTINUED | OUTPATIENT
Start: 2018-02-16 | End: 2018-02-20

## 2018-02-16 RX ORDER — ALBUMIN HUMAN 25 %
250 VIAL (ML) INTRAVENOUS ONCE
Qty: 0 | Refills: 0 | Status: COMPLETED | OUTPATIENT
Start: 2018-02-16 | End: 2018-02-16

## 2018-02-16 RX ORDER — ALBUMIN HUMAN 25 %
250 VIAL (ML) INTRAVENOUS ONCE
Qty: 0 | Refills: 0 | Status: DISCONTINUED | OUTPATIENT
Start: 2018-02-16 | End: 2018-02-16

## 2018-02-16 RX ADMIN — HEPARIN SODIUM 5000 UNIT(S): 5000 INJECTION INTRAVENOUS; SUBCUTANEOUS at 11:37

## 2018-02-16 RX ADMIN — Medication 3 MILLILITER(S): at 23:09

## 2018-02-16 RX ADMIN — Medication 8: at 06:53

## 2018-02-16 RX ADMIN — Medication 12.5 MILLIGRAM(S): at 06:15

## 2018-02-16 RX ADMIN — HEPARIN SODIUM 5000 UNIT(S): 5000 INJECTION INTRAVENOUS; SUBCUTANEOUS at 00:57

## 2018-02-16 RX ADMIN — Medication 3 MILLILITER(S): at 06:15

## 2018-02-16 RX ADMIN — NYSTATIN CREAM 1 APPLICATION(S): 100000 CREAM TOPICAL at 06:22

## 2018-02-16 RX ADMIN — Medication 12.5 MILLIGRAM(S): at 23:07

## 2018-02-16 RX ADMIN — HUMAN INSULIN 15 UNIT(S): 100 INJECTION, SUSPENSION SUBCUTANEOUS at 01:01

## 2018-02-16 RX ADMIN — Medication 3 MILLILITER(S): at 20:44

## 2018-02-16 RX ADMIN — Medication 3 MILLILITER(S): at 12:59

## 2018-02-16 RX ADMIN — Medication 125 MILLILITER(S): at 18:00

## 2018-02-16 RX ADMIN — Medication 3 MILLILITER(S): at 01:01

## 2018-02-16 RX ADMIN — HUMAN INSULIN 15 UNIT(S): 100 INJECTION, SUSPENSION SUBCUTANEOUS at 13:19

## 2018-02-16 RX ADMIN — Medication 1 APPLICATION(S): at 22:52

## 2018-02-16 RX ADMIN — Medication 4: at 13:20

## 2018-02-16 RX ADMIN — NYSTATIN CREAM 1 APPLICATION(S): 100000 CREAM TOPICAL at 23:08

## 2018-02-16 RX ADMIN — Medication 6: at 00:57

## 2018-02-16 RX ADMIN — Medication 1 APPLICATION(S): at 12:58

## 2018-02-16 RX ADMIN — Medication 81 MILLIGRAM(S): at 12:58

## 2018-02-16 NOTE — PROGRESS NOTE ADULT - PROBLEM SELECTOR PLAN 2
presumably 2' hypoxia/hypovolemic shock in setting of influenza AH3  TTE did not reveal endocardium well but she has no prior hx of PE or CAD  LFTs 2' shock liver

## 2018-02-16 NOTE — CONSULT NOTE ADULT - SUBJECTIVE AND OBJECTIVE BOX
HPI:  68y/o F hx Breast Cancer (diagnosed in Feb 2017) currently on Herceptin (last dose Jan 20th), HTN, Diabetes on 70/30, presenting with Fever at home, tmax of 102 yesterday, found to have PNA, course c/b PEA arrest, b/l PTX, MICU stay, renal failure. Derm consult is for itchy spots on chest and back. They are where tape and EKG leads have been placed. They are not painful. She is using HC currently. No skin pain or eye pain. Also taking antihistamines for itch.      PAST MEDICAL & SURGICAL HISTORY:  Diabetes  Breast CA  H/O mastectomy, bilateral      REVIEW OF SYSTEMS      General: no fevers/chills, no lethary	    Skin/Breast: see HPI  	  Ophthalmologic: no eye pain or change in vision  	  ENMT: no dysphagia or change in hearing    Respiratory and Thorax: no SOB or cough  	  Cardiovascular: no palpitations or chest pain    Gastrointestinal: no abdomenal pain or blood in stool     Genitourinary: no dysuria or frequency    Musculoskeletal: no joint pains or weakness	    Neurological:no weakness, numbness , or tingling    MEDICATIONS  (STANDING):  ALBUTerol/ipratropium for Nebulization 3 milliLiter(s) Nebulizer every 6 hours  aspirin  chewable 81 milliGRAM(s) Oral daily  carvedilol 12.5 milliGRAM(s) Oral every 12 hours  dextrose 5%. 1000 milliLiter(s) (50 mL/Hr) IV Continuous <Continuous>  dextrose 5%. 1000 milliLiter(s) (50 mL/Hr) IV Continuous <Continuous>  dextrose 50% Injectable 12.5 Gram(s) IV Push once  dextrose 50% Injectable 25 Gram(s) IV Push once  dextrose 50% Injectable 25 Gram(s) IV Push once  dextrose 50% Injectable 12.5 Gram(s) IV Push once  dextrose 50% Injectable 25 Gram(s) IV Push once  dextrose 50% Injectable 25 Gram(s) IV Push once  heparin  Injectable 5000 Unit(s) SubCutaneous every 8 hours  hydrALAZINE 25 milliGRAM(s) Oral every 8 hours  hydrocortisone 1% Cream 1 Application(s) Topical daily  insulin lispro (HumaLOG) corrective regimen sliding scale   SubCutaneous every 6 hours  insulin NPH human recombinant 15 Unit(s) SubCutaneous <User Schedule>  nystatin Powder 1 Application(s) Topical two times a day    MEDICATIONS  (PRN):  chlorhexidine 0.12% Liquid 15 milliLiter(s) Swish and Spit every 4 hours PRN mouth hygiene  dextrose Gel 1 Dose(s) Oral once PRN Blood Glucose LESS THAN 70 milliGRAM(s)/deciliter  dextrose Gel 1 Dose(s) Oral once PRN Blood Glucose LESS THAN 70 milliGRAM(s)/deciliter  glucagon  Injectable 1 milliGRAM(s) IntraMuscular once PRN Glucose LESS THAN 70 milligrams/deciliter  glucagon  Injectable 1 milliGRAM(s) IntraMuscular once PRN Glucose LESS THAN 70 milligrams/deciliter  hydrocortisone 2.5% Cream 1 Application(s) Topical every 6 hours PRN Rash and/or Itching      Allergies    doxycycline (Rash)  isoniazid (Rash)  NIFEdipine (Urticaria; Hives)  vitamin E (Short breath; Urticaria; Hives)      FAMILY HISTORY:  No pertinent family history in first degree relatives      Vital Signs Last 24 Hrs  T(C): 36.7 (16 Feb 2018 14:01), Max: 36.7 (16 Feb 2018 04:54)  T(F): 98.1 (16 Feb 2018 14:01), Max: 98.1 (16 Feb 2018 14:01)  HR: 86 (16 Feb 2018 14:01) (80 - 93)  BP: 155/74 (16 Feb 2018 14:01) (106/71 - 187/71)  BP(mean): 83 (15 Feb 2018 21:00) (83 - 104)  RR: 18 (16 Feb 2018 11:00) (17 - 23)  SpO2: 98% (16 Feb 2018 14:01) (95% - 100%)    PHYSICAL EXAM:     The patient was alert and oriented X 3, well nourished, and in no  apparent distress.  OP showed no ulcerations  There was no visible lymphadenopathy.  Conjunctiva were non injected  There was no clubbing or edema of extremities.  The scalp, hair, face, eyebrows, lips, OP, neck, chest, back,   extremities X 4, nails were examined.  There was no hyperhidrosis or bromhidrosis.    Of note on skin exam:       LABS:                        7.7    7.2   )-----------( 208      ( 16 Feb 2018 11:48 )             23.8     02-16    135  |  93<L>  |  82<H>  ----------------------------<  178<H>  6.5<HH>   |  25  |  5.27<H>    Ca    9.0      16 Feb 2018 11:48  Phos  8.4     02-16  Mg     3.3     02-16    TPro  6.5  /  Alb  2.6<L>  /  TBili  0.4  /  DBili  x   /  AST  58<H>  /  ALT  70<H>  /  AlkPhos  445<H>  02-16    PT/INR - ( 15 Feb 2018 00:19 )   PT: 9.6 sec;   INR: 0.88 ratio         PTT - ( 15 Feb 2018 00:19 )  PTT:31.8 sec      RADIOLOGY & ADDITIONAL STUDIES:

## 2018-02-16 NOTE — CONSULT NOTE ADULT - PROBLEM SELECTOR RECOMMENDATION 2
Back  Secondary to prolonged supine position  Keep area cool, clean, and dry  Frequent turning  Breathable fabrics  Ok to use triamcinolone and antihistamines as above

## 2018-02-16 NOTE — PROGRESS NOTE ADULT - ATTENDING COMMENTS
Agree with assessment and plan as above by Dr. English. Reviewed all pertinent labs, glucose values, and imaging studies. Modifications made as indicated above.     Andi Low D.O  [phone]

## 2018-02-16 NOTE — PROGRESS NOTE ADULT - SUBJECTIVE AND OBJECTIVE BOX
Patient is a 67y old  Female who presents with a chief complaint of Fever, total body weakness (02 Feb 2018 13:44)      SUBJECTIVE / OVERNIGHT EVENTS:    On bipap, resting comfortably  pruritus from skin dermatitis    Vital Signs Last 24 Hrs  T(C): 36.7 (16 Feb 2018 04:54), Max: 36.7 (16 Feb 2018 04:54)  T(F): 98 (16 Feb 2018 04:54), Max: 98 (16 Feb 2018 04:54)  HR: 90 (16 Feb 2018 05:16) (69 - 93)  BP: 106/71 (16 Feb 2018 04:54) (106/71 - 187/71)  BP(mean): 83 (15 Feb 2018 21:00) (83 - 107)  RR: 20 (16 Feb 2018 04:54) (15 - 23)  SpO2: 96% (16 Feb 2018 05:16) (95% - 100%)  I&O's Summary    15 Feb 2018 07:01  -  16 Feb 2018 07:00  --------------------------------------------------------  IN: 1680 mL / OUT: 0 mL / NET: 1680 mL        GENERAL: NAD, on bipap  HEAD:  Atraumatic, Normocephalic  EYES: EOMI  NECK: Supple, No JVD, No LAD, rt IJ nontunneled HD catheter  CHEST/LUNG: decreased BS at bases  HEART: Regular rate and rhythm; nl S1/S2  ABDOMEN: Soft, Nontender, Nondistended; Bowel sounds present  EXTREMITIES:  2+ Peripheral Pulses, No LE edema  SKIN: (+) macular rash chest and arms      LABS:                        8.3    8.3   )-----------( 266      ( 15 Feb 2018 00:19 )             25.3     02-15    137  |  96  |  50<H>  ----------------------------<  199<H>  5.3   |  28  |  3.49<H>    Ca    9.3      15 Feb 2018 00:19  Phos  7.3     02-15  Mg     2.8     02-15    TPro  6.8  /  Alb  2.8<L>  /  TBili  0.5  /  DBili  x   /  AST  41<H>  /  ALT  64<H>  /  AlkPhos  357<H>  02-15    PT/INR - ( 15 Feb 2018 00:19 )   PT: 9.6 sec;   INR: 0.88 ratio         PTT - ( 15 Feb 2018 00:19 )  PTT:31.8 sec  CAPILLARY BLOOD GLUCOSE      POCT Blood Glucose.: 347 mg/dL (16 Feb 2018 06:34)  POCT Blood Glucose.: 274 mg/dL (16 Feb 2018 00:46)  POCT Blood Glucose.: 252 mg/dL (15 Feb 2018 17:15)  POCT Blood Glucose.: 80 mg/dL (15 Feb 2018 11:59)            RADIOLOGY & ADDITIONAL TESTS:    Imaging Personally Reviewed:  [x] YES  [ ] NO    Consultant(s) Notes Reviewed:  [x] YES  [ ] NO      MEDICATIONS  (STANDING):  ALBUTerol/ipratropium for Nebulization 3 milliLiter(s) Nebulizer every 6 hours  aspirin  chewable 81 milliGRAM(s) Oral daily  carvedilol 12.5 milliGRAM(s) Oral every 12 hours  dextrose 5%. 1000 milliLiter(s) (50 mL/Hr) IV Continuous <Continuous>  dextrose 5%. 1000 milliLiter(s) (50 mL/Hr) IV Continuous <Continuous>  dextrose 50% Injectable 12.5 Gram(s) IV Push once  dextrose 50% Injectable 25 Gram(s) IV Push once  dextrose 50% Injectable 25 Gram(s) IV Push once  dextrose 50% Injectable 12.5 Gram(s) IV Push once  dextrose 50% Injectable 25 Gram(s) IV Push once  dextrose 50% Injectable 25 Gram(s) IV Push once  heparin  Injectable 5000 Unit(s) SubCutaneous every 8 hours  hydrALAZINE 25 milliGRAM(s) Oral every 8 hours  hydrocortisone 1% Cream 1 Application(s) Topical daily  insulin lispro (HumaLOG) corrective regimen sliding scale   SubCutaneous every 6 hours  insulin NPH human recombinant 15 Unit(s) SubCutaneous <User Schedule>  nystatin Powder 1 Application(s) Topical two times a day    MEDICATIONS  (PRN):  chlorhexidine 0.12% Liquid 15 milliLiter(s) Swish and Spit every 4 hours PRN mouth hygiene  dextrose Gel 1 Dose(s) Oral once PRN Blood Glucose LESS THAN 70 milliGRAM(s)/deciliter  dextrose Gel 1 Dose(s) Oral once PRN Blood Glucose LESS THAN 70 milliGRAM(s)/deciliter  glucagon  Injectable 1 milliGRAM(s) IntraMuscular once PRN Glucose LESS THAN 70 milligrams/deciliter  glucagon  Injectable 1 milliGRAM(s) IntraMuscular once PRN Glucose LESS THAN 70 milligrams/deciliter  hydrocortisone 2.5% Cream 1 Application(s) Topical every 6 hours PRN Rash and/or Itching      Care Discussed with Consultants/Other Providers [x] YES  [ ] NO    HEALTH ISSUES - PROBLEM Dx:  Essential hypertension: Essential hypertension  Age related osteoporosis, unspecified pathological fracture presence: Age related osteoporosis, unspecified pathological fracture presence  Uncontrolled type 2 diabetes mellitus with diabetic nephropathy, with long-term current use of insulin: Uncontrolled type 2 diabetes mellitus with diabetic nephropathy, with long-term current use of insulin  Hypokalemia: Hypokalemia  RUSS (acute kidney injury): RUSS (acute kidney injury)

## 2018-02-16 NOTE — CONSULT NOTE ADULT - SUBJECTIVE AND OBJECTIVE BOX
PULMONARY CONSULT  Caleb Mon MD  [phone]    Initial HPI on admission:  HPI:  68y/o F hx Breast Cancer (diagnosed in Feb 2017) currently on Herceptin (last dose Jan 20th), HTN, Diabetes on 70/30, presenting with Fever at home, tmax of 102 yesterday, with whole body weakness. Patient also had decreased appetite yesterday. Patient's daughter states that she herself felt unwell and was going to bring both herself and her mother to the doctor's office today. However, given pt had worsening weakness, daughter brought the patient to the ER. Patient is otherwise noted to be independent and lives at home with her . She is AOx4 at baseline.   Patient transferred from MICU to floor for further care    In the ER, patient was found to have T max of 100.6, with initial vitals of 99.5, HR 82, /78, RR 18, satting 93% on room air. She was given ceftriaxone x1, and given 2L normal saline bolus with Duoneb x 3. Patient was also found to be RVP positive for flu. Patient then went into PEA arrest with chest compressions for 10min, received Rosc after Epi x 2, and was intubated. Patient required re-intubation (improperly intubated initially into the esophagus) after she PEA arrested again, with ROSC achieved after 30min with Epi x 5. Patient was also found to have bilateral pneumothoraces, s/p 3 chest tubes, 2 on the right side, and 1 on the left. Patient also has paracentesis drain for decompression of the abdomen.     Hospital Course    67F w PMH of bilateral Breast CA (on Trastuzumab, last dose Jan 20th) admitted to MICU with influenza PNA c/b PEA arrest x 2 with ROSC x 2 (10 min + 30 min). CPR c/b bilateral PTX (s/p bilateral chest tubes), pneumoperitoneum (s/p Peritoneal drain (pigtail) in right hemiabdomen and paracentesis decompression) a/w extensive subcutaneous emphysema. Pt received 100 mg of empiric TPA in ED. Surgery consulted and not recommending surgical intervention.   All 3 chest tubes removed on 1/31. Pt had cardiac MRI on 2/1 which was wnl. Right sided IJ removed and right shiley placed and HD initiated on 2/1. Abdominal drain removed on 2/1.  Patients pneumothorax resolved, and chest tube now out   Neurologic:  MRI of head showed watershed infarcts and L occipital infarct.   Patient with mild hypercapnea on recent ABG and is currently using BIPAP intermittently at 12/5  F/U CXR post CT removal without PTC  She is undergoing HD per renal service        PAST MEDICAL & SURGICAL HISTORY:  Diabetes  Breast CA  H/O mastectomy, bilateral    Allergies    doxycycline (Rash)  isoniazid (Rash)  NIFEdipine (Urticaria; Hives)  vitamin E (Short breath; Urticaria; Hives)    Intolerances      FAMILY HISTORY:  No pertinent family history in first degree relatives    Social history:      Review of Systems:  Review of Systems: ROS unable to be obtained as confused and currently on BIPAP for ventiltory support.	      Allergies and Intolerances:        Allergies:  	vitamin E: Drug, Short breath, Urticaria, Hives  	NIFEdipine: Drug, Urticaria, Hives    Medications:  MEDICATIONS  (STANDING):  ALBUTerol/ipratropium for Nebulization 3 milliLiter(s) Nebulizer every 6 hours  aspirin  chewable 81 milliGRAM(s) Oral daily  carvedilol 12.5 milliGRAM(s) Oral every 12 hours  dextrose 5%. 1000 milliLiter(s) (50 mL/Hr) IV Continuous <Continuous>  dextrose 5%. 1000 milliLiter(s) (50 mL/Hr) IV Continuous <Continuous>  dextrose 50% Injectable 12.5 Gram(s) IV Push once  dextrose 50% Injectable 25 Gram(s) IV Push once  dextrose 50% Injectable 25 Gram(s) IV Push once  dextrose 50% Injectable 12.5 Gram(s) IV Push once  dextrose 50% Injectable 25 Gram(s) IV Push once  dextrose 50% Injectable 25 Gram(s) IV Push once  heparin  Injectable 5000 Unit(s) SubCutaneous every 8 hours  hydrALAZINE 25 milliGRAM(s) Oral every 8 hours  hydrocortisone 1% Cream 1 Application(s) Topical daily  insulin lispro (HumaLOG) corrective regimen sliding scale   SubCutaneous every 6 hours  insulin NPH human recombinant 15 Unit(s) SubCutaneous <User Schedule>  nystatin Powder 1 Application(s) Topical two times a day    MEDICATIONS  (PRN):  chlorhexidine 0.12% Liquid 15 milliLiter(s) Swish and Spit every 4 hours PRN mouth hygiene  dextrose Gel 1 Dose(s) Oral once PRN Blood Glucose LESS THAN 70 milliGRAM(s)/deciliter  dextrose Gel 1 Dose(s) Oral once PRN Blood Glucose LESS THAN 70 milliGRAM(s)/deciliter  glucagon  Injectable 1 milliGRAM(s) IntraMuscular once PRN Glucose LESS THAN 70 milligrams/deciliter  glucagon  Injectable 1 milliGRAM(s) IntraMuscular once PRN Glucose LESS THAN 70 milligrams/deciliter  hydrocortisone 2.5% Cream 1 Application(s) Topical every 6 hours PRN Rash and/or Itching    Vital Signs Last 24 Hrs  T(C): 36.7 (16 Feb 2018 04:54), Max: 36.7 (16 Feb 2018 04:54)  T(F): 98 (16 Feb 2018 04:54), Max: 98 (16 Feb 2018 04:54)  HR: 90 (16 Feb 2018 05:16) (69 - 93)  BP: 106/71 (16 Feb 2018 04:54) (106/71 - 187/71)  BP(mean): 83 (15 Feb 2018 21:00) (83 - 107)  RR: 20 (16 Feb 2018 04:54) (15 - 23)  SpO2: 96% (16 Feb 2018 05:16) (95% - 100%)    ABG - ( 14 Feb 2018 20:10 )  pH: 7.34  /  pCO2: 53    /  pO2: 78    / HCO3: 28    / Base Excess: 2.4   /  SaO2: 96                    02-15 @ 07:01  -  02-16 @ 07:00  --------------------------------------------------------  IN: 1680 mL / OUT: 0 mL / NET: 1680 mL      LABS:                        8.3    8.3   )-----------( 266      ( 15 Feb 2018 00:19 )             25.3     02-15    137  |  96  |  50<H>  ----------------------------<  199<H>  5.3   |  28  |  3.49<H>    Ca    9.3      15 Feb 2018 00:19  Phos  7.3     02-15  Mg     2.8     02-15    TPro  6.8  /  Alb  2.8<L>  /  TBili  0.5  /  DBili  x   /  AST  41<H>  /  ALT  64<H>  /  AlkPhos  357<H>  02-15      PT/INR - ( 15 Feb 2018 00:19 )   PT: 9.6 sec;   INR: 0.88 ratio         PTT - ( 15 Feb 2018 00:19 )  PTT:31.8 sec          CULTURES:        Physical Examination:    General: Non toxic, No acute distress.  Breathing comfortbly supine on BIPAP at 12/5    HEENT: Pupils equal, reactive to light.  Symmetric.    PULM: Decreased excursion, without gross wheeze or rhonchi    CVS: Regular rate and rhythm, no murmurs, rubs, or gallops    ABD: Soft, nondistended, nontender, normoactive bowel sounds, no masses    EXT: Tace UE nd presacral edema    SKIN: Warm and well perfused, scattered erythematous maculo papulr rash  NEURO: Alert, oriented, interactive,   RADIOLOGY REVIEWED PERSONALLY  CXR: MARISOL; No PTX    CT chest:     TTE: Limited evaluation LV sytolic function

## 2018-02-16 NOTE — PROVIDER CONTACT NOTE (OTHER) - BACKGROUND
Pt. was admitted to MICU for flu positive, PEA arrest. Pt. was admitted to MICU for flu positive, PEA arrest (01/30/18).

## 2018-02-16 NOTE — PROGRESS NOTE ADULT - ATTENDING COMMENTS
66 yo F with Breast cancer, HTN presented with the flu, went into respiratory arrest, then PEA arrest, s/p chest tube, TPA and intubation.   Patient now with shock liver, RUSS pneumoperitoneum, elevated cardiac enzymes. Chest tubes and peritoneal tubes removed, shock liver now resolved. pt continues to be anuric requiring HD with fluid removal.  HD tiw  Tends to fluid overload  Needs conv to cuffed cath

## 2018-02-16 NOTE — PROGRESS NOTE ADULT - ASSESSMENT
68y/o F hx Breast Cancer (diagnosed in Feb 2017) currently on Herceptin (last dose Jan 20th), HTN, Diabetes on 70/30, presenting with influenza AH3, PEA arrest in ED likely 2' hypoxia requiring intubation which was complicated by pneumothorax and pneumoperitoneam requiring chest tubes and peritoneal drain.  Also developed RUSS requiring initiation of HD, and contact dermatitis.  Also found to have acute CVA.

## 2018-02-16 NOTE — CONSULT NOTE ADULT - PROBLEM SELECTOR RECOMMENDATION 9
Likely to adhesives given distribution  Recommend minimizing use of skin adhesives as possible  Switch hydrocortisone to triamcinolone 0.1% ointment BID to itchy areas, not for face or folds, only for 1-2 weeks at a time.   Ok to use antihistamines prn itch, non-sedating during AM, atarx qhs if no contraindications  -monitor for worsening

## 2018-02-16 NOTE — PROGRESS NOTE ADULT - PROBLEM SELECTOR PLAN 1
- increase NPH to 20 units at 12 am, 12 pm, and 6 pm.  Please HOLD NPH when tube feeds are held.  -Check bs q6  -POC q6  - will follow

## 2018-02-16 NOTE — PROGRESS NOTE ADULT - SUBJECTIVE AND OBJECTIVE BOX
RRT - 67 year old female with a PMH of breast cancer(diagnosed in 2017) on Herceptin,  admitted with  Influenza. ER course was complicated by PEA arrest with Acheval of ROSC Reintubation (+) Pneumothoraces with chest tube placement. Patient was admitted to MICU for further management. HD was started for RUSS. Further, more the patient was found to have (+) water shed infarcts and acute Left occipital infarct.  Today, RRT was called for desaturation. The patient was on Bi- Pap support and no longer receiving HD.  Spo2 on arrival 89%. b/p 106/70 rr25 hrt rate 80's.   Blood gas, cmp, mag, phos, and cbc were all drawn   EKG and chest x ray ordered   The patient remained hemodynamically stable - Spo2 increased to 100%   5% Albumin in 250ml given x2 due to mild hypotension baseline B/P 130's - 150's systolic. No acute lung pathology on PE (+) BL clear breath sounds. Bi Pap setting changed to AVAP   The patient will be transferred to telemetry for further monitoring

## 2018-02-16 NOTE — PROGRESS NOTE ADULT - SUBJECTIVE AND OBJECTIVE BOX
Chief Complaint: f/u DM2    History:  Patient transferred to medicine floor. Currently on BIPAP, tube feeds on hold. As per daughter at bedside, patient was more alert this AM. Currently appears lethargic at bedside. Tube feeds held this AM.     MEDICATIONS  (STANDING):  ALBUTerol/ipratropium for Nebulization 3 milliLiter(s) Nebulizer every 6 hours  aspirin  chewable 81 milliGRAM(s) Oral daily  carvedilol 12.5 milliGRAM(s) Oral every 12 hours  dextrose 5%. 1000 milliLiter(s) (50 mL/Hr) IV Continuous <Continuous>  dextrose 5%. 1000 milliLiter(s) (50 mL/Hr) IV Continuous <Continuous>  dextrose 50% Injectable 12.5 Gram(s) IV Push once  dextrose 50% Injectable 25 Gram(s) IV Push once  dextrose 50% Injectable 25 Gram(s) IV Push once  dextrose 50% Injectable 12.5 Gram(s) IV Push once  dextrose 50% Injectable 25 Gram(s) IV Push once  dextrose 50% Injectable 25 Gram(s) IV Push once  heparin  Injectable 5000 Unit(s) SubCutaneous every 8 hours  hydrALAZINE 25 milliGRAM(s) Oral every 8 hours  hydrocortisone 1% Cream 1 Application(s) Topical daily  insulin lispro (HumaLOG) corrective regimen sliding scale   SubCutaneous every 6 hours  insulin NPH human recombinant 15 Unit(s) SubCutaneous <User Schedule>  nystatin Powder 1 Application(s) Topical two times a day    MEDICATIONS  (PRN):  chlorhexidine 0.12% Liquid 15 milliLiter(s) Swish and Spit every 4 hours PRN mouth hygiene  dextrose Gel 1 Dose(s) Oral once PRN Blood Glucose LESS THAN 70 milliGRAM(s)/deciliter  dextrose Gel 1 Dose(s) Oral once PRN Blood Glucose LESS THAN 70 milliGRAM(s)/deciliter  glucagon  Injectable 1 milliGRAM(s) IntraMuscular once PRN Glucose LESS THAN 70 milligrams/deciliter  glucagon  Injectable 1 milliGRAM(s) IntraMuscular once PRN Glucose LESS THAN 70 milligrams/deciliter  hydrocortisone 2.5% Cream 1 Application(s) Topical every 6 hours PRN Rash and/or Itching      Allergies    doxycycline (Rash)  isoniazid (Rash)  NIFEdipine (Urticaria; Hives)  vitamin E (Short breath; Urticaria; Hives)    Review of Systems:  UNABLE TO OBTAIN - patient lethargic    PHYSICAL EXAM:  VITALS: T(C): 36.7 (02-16-18 @ 14:01)  T(F): 98.1 (02-16-18 @ 14:01), Max: 98.1 (02-16-18 @ 14:01)  HR: 86 (02-16-18 @ 14:01) (80 - 93)  BP: 155/74 (02-16-18 @ 14:01) (106/71 - 187/71)  RR:  (17 - 23)  SpO2:  (95% - 100%)  Wt(kg): --  GENERAL: NAD,  well-developed  EYES: No proptosis, anicteric  HEENT:  Atraumatic, Normocephalic, BIPAP mask in place  RESPIRATORY: Clear to auscultation bilaterally; No rales, rhonchi, wheezing, or rubs  CARDIOVASCULAR: Regular rate and rhythm; No murmurs  GI: Soft, nontender, non distended  PSYCH: patient lethargic      POCT Blood Glucose.: 230 mg/dL (02-16-18 @ 13:06) NPH 15, H 4  POCT Blood Glucose.: 347 mg/dL (02-16-18 @ 06:34) H 8  POCT Blood Glucose.: 274 mg/dL (02-16-18 @ 00:46) NPH 15, H 6  POCT Blood Glucose.: 252 mg/dL (02-15-18 @ 17:15) NPH 15, H 6  POCT Blood Glucose.: 80 mg/dL (02-15-18 @ 11:59) NPH 15  POCT Blood Glucose.: 91 mg/dL (02-15-18 @ 05:34)  POCT Blood Glucose.: 199 mg/dL (02-14-18 @ 23:32)  POCT Blood Glucose.: 235 mg/dL (02-14-18 @ 17:46)  POCT Blood Glucose.: 235 mg/dL (02-14-18 @ 11:48)  POCT Blood Glucose.: 325 mg/dL (02-14-18 @ 05:05)  POCT Blood Glucose.: 141 mg/dL (02-13-18 @ 17:15)      02-16    135  |  93<L>  |  82<H>  ----------------------------<  178<H>  6.5<HH>   |  25  |  5.27<H>    EGFR if : 9<L>  EGFR if non : 8<L>    Ca    9.0      02-16  Mg     3.3     02-16  Phos  8.4     02-16    TPro  6.5  /  Alb  2.6<L>  /  TBili  0.4  /  DBili  x   /  AST  58<H>  /  ALT  70<H>  /  AlkPhos  445<H>  02-16          Thyroid Function Tests:      Hemoglobin A1C, Whole Blood: 8.6 % <H> [4.0 - 5.6] (01-31-18 @ 07:15)  Hemoglobin A1C, Whole Blood: 8.7 % <H> [4.0 - 5.6] (01-31-18 @ 05:51)

## 2018-02-16 NOTE — PROGRESS NOTE ADULT - ASSESSMENT
68 yo F with Breast cancer, HTN presented with the flu, went into respiratory arrest, then PEA arrest, s/p chest tube, TPA and intubation.   Patient now with shock liver, RUSS pneumoperitoneum, elevated cardiac enzymes 66 yo F with Breast cancer, HTN presented with the flu, went into respiratory arrest, then PEA arrest, s/p chest tube, TPA and intubation.   Patient now with shock liver, RUSS pneumoperitoneum, elevated cardiac enzymes. Chest tubes and peritoneal tubes removed, shock liver now resolved. pt continues to be anuric requiring HD with fluid removal.

## 2018-02-16 NOTE — CONSULT NOTE ADULT - ASSESSMENT
s/p PEA arrest in setting of viral (influeza A) sepsis  Breast CA on Herceptin  L cortical and subcortical acute CNS infarcts  RUSS now on HD  Hypercapneic and hypoxemic respiratory failure - now extubated with mild hypercapnea  s/p bilateral pneumothorax and pneumopeitoneum  BG sludge on recent RUQ ultrasound  L 5th and 6th rib fractures, post CPR in ER on admission  2/2/18 LE dopplers negative for DVT    REC    Continue BIPAP prn for work of breathing - decrease to 10/5: convert to nasal oxygen as tolerated  duoneb qid  Repeat ABG off on nasal O2 2/17  HD pre renal  Neuro f/u  Repeat LE dopplers given increased risk from CVA, immobility, malignancy, prior herceptin  Aspiration precautions: continue NGT until patient off BIPAP

## 2018-02-16 NOTE — PROGRESS NOTE ADULT - PROBLEM SELECTOR PLAN 1
likely from ATN in setting of cardiac arrest. Pt continues to be oligo anuric.  s/p HD on Monday and Tuesday and PUF on Wednesday. will set up for HD today.  Pt needs permanent HD catheter.

## 2018-02-16 NOTE — PROGRESS NOTE ADULT - PROBLEM SELECTOR PLAN 1
in setting of flu, PEA, pneumothorax  s/p successful extubation in MICU 2/8  now on BipaP daytime and nocturnally  serial CXRs  appreciate pulm  continue duonebs

## 2018-02-17 LAB
ALBUMIN SERPL ELPH-MCNC: 2.9 G/DL — LOW (ref 3.3–5)
ALP SERPL-CCNC: 360 U/L — HIGH (ref 40–120)
ALT FLD-CCNC: 54 U/L — HIGH (ref 10–45)
ANION GAP SERPL CALC-SCNC: 16 MMOL/L — SIGNIFICANT CHANGE UP (ref 5–17)
AST SERPL-CCNC: 36 U/L — SIGNIFICANT CHANGE UP (ref 10–40)
BASE EXCESS BLDA CALC-SCNC: 4.2 MMOL/L — HIGH (ref -2–2)
BILIRUB SERPL-MCNC: 0.5 MG/DL — SIGNIFICANT CHANGE UP (ref 0.2–1.2)
BLD GP AB SCN SERPL QL: NEGATIVE — SIGNIFICANT CHANGE UP
BUN SERPL-MCNC: 64 MG/DL — HIGH (ref 7–23)
CALCIUM SERPL-MCNC: 8.6 MG/DL — SIGNIFICANT CHANGE UP (ref 8.4–10.5)
CHLORIDE SERPL-SCNC: 96 MMOL/L — SIGNIFICANT CHANGE UP (ref 96–108)
CO2 BLDA-SCNC: 29 MMOL/L — SIGNIFICANT CHANGE UP (ref 22–30)
CO2 SERPL-SCNC: 26 MMOL/L — SIGNIFICANT CHANGE UP (ref 22–31)
CREAT SERPL-MCNC: 4.62 MG/DL — HIGH (ref 0.5–1.3)
GAS PNL BLDA: SIGNIFICANT CHANGE UP
GLUCOSE BLDC GLUCOMTR-MCNC: 132 MG/DL — HIGH (ref 70–99)
GLUCOSE BLDC GLUCOMTR-MCNC: 137 MG/DL — HIGH (ref 70–99)
GLUCOSE BLDC GLUCOMTR-MCNC: 148 MG/DL — HIGH (ref 70–99)
GLUCOSE BLDC GLUCOMTR-MCNC: 168 MG/DL — HIGH (ref 70–99)
GLUCOSE BLDC GLUCOMTR-MCNC: 273 MG/DL — HIGH (ref 70–99)
GLUCOSE SERPL-MCNC: 137 MG/DL — HIGH (ref 70–99)
HCO3 BLDA-SCNC: 28 MMOL/L — SIGNIFICANT CHANGE UP (ref 21–29)
HCT VFR BLD CALC: 21.5 % — LOW (ref 34.5–45)
HCT VFR BLD CALC: 22.2 % — LOW (ref 34.5–45)
HGB BLD-MCNC: 6.9 G/DL — CRITICAL LOW (ref 11.5–15.5)
HGB BLD-MCNC: 7.3 G/DL — LOW (ref 11.5–15.5)
MAGNESIUM SERPL-MCNC: 2.8 MG/DL — HIGH (ref 1.6–2.6)
MCHC RBC-ENTMCNC: 29.2 PG — SIGNIFICANT CHANGE UP (ref 27–34)
MCHC RBC-ENTMCNC: 31 PG — SIGNIFICANT CHANGE UP (ref 27–34)
MCHC RBC-ENTMCNC: 32.1 GM/DL — SIGNIFICANT CHANGE UP (ref 32–36)
MCHC RBC-ENTMCNC: 32.9 GM/DL — SIGNIFICANT CHANGE UP (ref 32–36)
MCV RBC AUTO: 91.1 FL — SIGNIFICANT CHANGE UP (ref 80–100)
MCV RBC AUTO: 94.2 FL — SIGNIFICANT CHANGE UP (ref 80–100)
OB PNL STL: NEGATIVE — SIGNIFICANT CHANGE UP
PCO2 BLDA: 38 MMHG — SIGNIFICANT CHANGE UP (ref 32–46)
PH BLDA: 7.48 — HIGH (ref 7.35–7.45)
PHOSPHATE SERPL-MCNC: 7 MG/DL — HIGH (ref 2.5–4.5)
PLATELET # BLD AUTO: 199 K/UL — SIGNIFICANT CHANGE UP (ref 150–400)
PLATELET # BLD AUTO: 227 K/UL — SIGNIFICANT CHANGE UP (ref 150–400)
PO2 BLDA: 188 MMHG — HIGH (ref 74–108)
POTASSIUM SERPL-MCNC: 5.6 MMOL/L — HIGH (ref 3.5–5.3)
POTASSIUM SERPL-SCNC: 5.6 MMOL/L — HIGH (ref 3.5–5.3)
PROT SERPL-MCNC: 6.4 G/DL — SIGNIFICANT CHANGE UP (ref 6–8.3)
RBC # BLD: 2.36 M/UL — LOW (ref 3.8–5.2)
RBC # BLD: 2.36 M/UL — LOW (ref 3.8–5.2)
RBC # FLD: 16.5 % — HIGH (ref 10.3–14.5)
RBC # FLD: 18.8 % — HIGH (ref 10.3–14.5)
RH IG SCN BLD-IMP: POSITIVE — SIGNIFICANT CHANGE UP
SAO2 % BLDA: 101 % — HIGH (ref 92–96)
SODIUM SERPL-SCNC: 138 MMOL/L — SIGNIFICANT CHANGE UP (ref 135–145)
WBC # BLD: 5.8 K/UL — SIGNIFICANT CHANGE UP (ref 3.8–10.5)
WBC # BLD: 6.29 K/UL — SIGNIFICANT CHANGE UP (ref 3.8–10.5)
WBC # FLD AUTO: 5.8 K/UL — SIGNIFICANT CHANGE UP (ref 3.8–10.5)
WBC # FLD AUTO: 6.29 K/UL — SIGNIFICANT CHANGE UP (ref 3.8–10.5)

## 2018-02-17 PROCEDURE — 99233 SBSQ HOSP IP/OBS HIGH 50: CPT | Mod: GC

## 2018-02-17 RX ADMIN — Medication 1 APPLICATION(S): at 18:27

## 2018-02-17 RX ADMIN — NYSTATIN CREAM 1 APPLICATION(S): 100000 CREAM TOPICAL at 18:28

## 2018-02-17 RX ADMIN — CARVEDILOL PHOSPHATE 12.5 MILLIGRAM(S): 80 CAPSULE, EXTENDED RELEASE ORAL at 09:10

## 2018-02-17 RX ADMIN — Medication 3 MILLILITER(S): at 18:27

## 2018-02-17 RX ADMIN — Medication 6: at 23:43

## 2018-02-17 RX ADMIN — HEPARIN SODIUM 5000 UNIT(S): 5000 INJECTION INTRAVENOUS; SUBCUTANEOUS at 11:52

## 2018-02-17 RX ADMIN — Medication 81 MILLIGRAM(S): at 11:51

## 2018-02-17 RX ADMIN — Medication 3 MILLILITER(S): at 23:42

## 2018-02-17 RX ADMIN — Medication 2: at 12:33

## 2018-02-17 RX ADMIN — Medication 3 MILLILITER(S): at 11:51

## 2018-02-17 RX ADMIN — Medication 3 MILLILITER(S): at 06:38

## 2018-02-17 RX ADMIN — HEPARIN SODIUM 5000 UNIT(S): 5000 INJECTION INTRAVENOUS; SUBCUTANEOUS at 18:28

## 2018-02-17 RX ADMIN — HEPARIN SODIUM 5000 UNIT(S): 5000 INJECTION INTRAVENOUS; SUBCUTANEOUS at 06:38

## 2018-02-17 RX ADMIN — Medication 1 APPLICATION(S): at 06:38

## 2018-02-17 RX ADMIN — CARVEDILOL PHOSPHATE 12.5 MILLIGRAM(S): 80 CAPSULE, EXTENDED RELEASE ORAL at 22:19

## 2018-02-17 RX ADMIN — NYSTATIN CREAM 1 APPLICATION(S): 100000 CREAM TOPICAL at 06:08

## 2018-02-17 RX ADMIN — Medication 25 MILLIGRAM(S): at 13:17

## 2018-02-17 RX ADMIN — HUMAN INSULIN 20 UNIT(S): 100 INJECTION, SUSPENSION SUBCUTANEOUS at 23:43

## 2018-02-17 RX ADMIN — Medication 25 MILLIGRAM(S): at 23:54

## 2018-02-17 NOTE — PROGRESS NOTE ADULT - ATTENDING COMMENTS
I have seen the patient and reviewed dialysis prescription Dialysis prescription has been adjusted for optimized control of volume status, uremia and electrolytes. Management of additional metabolic abnormalities/fluid balance will continue to be addressed on follow up.

## 2018-02-17 NOTE — PROGRESS NOTE ADULT - PROBLEM SELECTOR PLAN 1
in setting of flu, PEA, pneumothorax  s/p successful extubation in MICU 2/8  now on NC daytime and BIPAP AVAP nocturnally  don't see any pneumothorax on latest CXR  appreciate pulm  continue duonebs

## 2018-02-17 NOTE — CHART NOTE - NSCHARTNOTEFT_GEN_A_CORE
Notified by Speech therapist patient failed speech eval rec to schedule patient for FEESt and keep NPO for increase risk aspiration  discussed case with Dr May who's agreeable with above testing   Medicine NP ALANIS Avilez [phone]

## 2018-02-17 NOTE — SWALLOW BEDSIDE ASSESSMENT ADULT - PHARYNGEAL PHASE
wet breath sounds with wet cough post intake/Decreased laryngeal elevation/Multiple swallows/Delayed pharyngeal swallow

## 2018-02-17 NOTE — PROGRESS NOTE ADULT - SUBJECTIVE AND OBJECTIVE BOX
[facility] DIVISION OF KIDNEY DISEASES AND HYPERTENSION -- HEMODIALYSIS NOTE  --------------------------------------------------------------------------------  Chief Complaint; Ongoing hemodialysis requirement    24 hour events/subjective:  pt desatted during HD yesterday and RRT was called.        PAST HISTORY  --------------------------------------------------------------------------------  No significant changes to PMH, PSH, FHx, SHx, unless otherwise noted    ALLERGIES & MEDICATIONS  --------------------------------------------------------------------------------  Allergies    doxycycline (Rash)  isoniazid (Rash)  NIFEdipine (Urticaria; Hives)  vitamin E (Short breath; Urticaria; Hives)    Intolerances      Standing Inpatient Medications  ALBUTerol/ipratropium for Nebulization 3 milliLiter(s) Nebulizer every 6 hours  aspirin  chewable 81 milliGRAM(s) Oral daily  carvedilol 12.5 milliGRAM(s) Oral every 12 hours  dextrose 5%. 1000 milliLiter(s) IV Continuous <Continuous>  dextrose 5%. 1000 milliLiter(s) IV Continuous <Continuous>  dextrose 50% Injectable 12.5 Gram(s) IV Push once  dextrose 50% Injectable 25 Gram(s) IV Push once  dextrose 50% Injectable 25 Gram(s) IV Push once  dextrose 50% Injectable 12.5 Gram(s) IV Push once  dextrose 50% Injectable 25 Gram(s) IV Push once  dextrose 50% Injectable 25 Gram(s) IV Push once  heparin  Injectable 5000 Unit(s) SubCutaneous every 8 hours  hydrALAZINE 25 milliGRAM(s) Oral every 8 hours  insulin lispro (HumaLOG) corrective regimen sliding scale   SubCutaneous every 6 hours  insulin NPH human recombinant 20 Unit(s) SubCutaneous <User Schedule>  nystatin Powder 1 Application(s) Topical two times a day  triamcinolone 0.1% Ointment 1 Application(s) Topical two times a day    PRN Inpatient Medications  chlorhexidine 0.12% Liquid 15 milliLiter(s) Swish and Spit every 4 hours PRN  dextrose Gel 1 Dose(s) Oral once PRN  dextrose Gel 1 Dose(s) Oral once PRN  glucagon  Injectable 1 milliGRAM(s) IntraMuscular once PRN  glucagon  Injectable 1 milliGRAM(s) IntraMuscular once PRN      REVIEW OF SYSTEMS  --------------------------------------------------------------------------------  unable to obtain      VITALS/PHYSICAL EXAM  --------------------------------------------------------------------------------  T(C): 37.1 (02-17-18 @ 11:10), Max: 37.4 (02-17-18 @ 04:02)  HR: 82 (02-17-18 @ 11:10) (82 - 97)  BP: 138/58 (02-17-18 @ 13:16) (121/68 - 162/48)  RR: 18 (02-17-18 @ 11:10) (18 - 19)  SpO2: 100% (02-17-18 @ 11:10) (95% - 100%)  Wt(kg): --        02-16-18 @ 07:01  -  02-17-18 @ 07:00  --------------------------------------------------------  IN: 700 mL / OUT: 0 mL / NET: 700 mL      Physical Exam:  	Gen: NAD  	Pulm: CTA B/L  	CV: RRR, S1S2; no rub  	Abd: +BS, soft, nontender/nondistended  	: No suprapubic tenderness  	UE: Warm,no edema; no asterixis  	LE: Warm,  no edema  	Skin: Warm, without rashes  	Vascular access: RIJ non tunneled catheter     LABS/STUDIES  --------------------------------------------------------------------------------              7.3    5.8   >-----------<  199      [02-17-18 @ 11:02]              22.2     138  |  96  |  64  ----------------------------<  137      [02-17-18 @ 08:54]  5.6   |  26  |  4.62        Ca     8.6     [02-17-18 @ 08:54]      Mg     2.8     [02-17-18 @ 08:54]      Phos  7.0     [02-17-18 @ 08:54]    TPro  6.4  /  Alb  2.9  /  TBili  0.5  /  DBili  x   /  AST  36  /  ALT  54  /  AlkPhos  360  [02-17-18 @ 08:54]    PT/INR: PT 9.7  , INR 0.89       [02-16-18 @ 17:46]  PTT: 24.9       [02-16-18 @ 17:46]    Troponin 0.03      [02-16-18 @ 17:46]  CK 15      [02-16-18 @ 17:46]    HbA1c 8.6      [01-31-18 @ 07:15]  Lipid: chol 183, , HDL 10,       [02-07-18 @ 13:34]    HBsAg Nonreact      [02-01-18 @ 22:49]  HCV 0.16, Nonreact      [02-01-18 @ 22:49]

## 2018-02-17 NOTE — PROGRESS NOTE ADULT - ASSESSMENT
68 yo F with Breast cancer, HTN presented with the flu, went into respiratory arrest, then PEA arrest, s/p chest tube, TPA and intubation.   Patient now with shock liver, RUSS pneumoperitoneum, elevated cardiac enzymes. Chest tubes and peritoneal tubes removed, shock liver now resolved. pt continues to be anuric requiring HD with fluid removal.

## 2018-02-17 NOTE — PROGRESS NOTE ADULT - ASSESSMENT
HYpoxemic and hypercapneic resp failure - clinically resolving  RUSS progressed to ESRD on HD  S/P CAC with rib fractures and bilateral pneumothoraces  CVA with corical and subcortical infarcts  s/p cardiac arrest    REC    Pulm status appears stable at this time  Would continue on nasal oxygen: no need for BIPAP : can keep available for prn use for now  HD per renal service  Sugg repeat LE dopplers

## 2018-02-17 NOTE — SWALLOW BEDSIDE ASSESSMENT ADULT - SWALLOW EVAL: DIAGNOSIS
Pt presents with 1. an oral and suspected pharyngeal dysphagia marked by delayed oral transit time, multiple swallows, and increased wet breath sounds with wet cough post PO intake suggestive of aspiration. 2. Dysphonia marked by hoarse/harsh hypophonic vocal quality

## 2018-02-17 NOTE — PROGRESS NOTE ADULT - PROBLEM SELECTOR PLAN 1
likely from ATN in setting of cardiac arrest. Pt continues to be oligo anuric.  RRT called during HD yesterday for desaturation and treatment was stopped.  Bedside HD today.   Pt needs permanent HD catheter for ongoing HD requirement.

## 2018-02-17 NOTE — PROGRESS NOTE ADULT - SUBJECTIVE AND OBJECTIVE BOX
Follow-up Pulm Progress Note  Caleb Mon MD  [phone]    Events noted: transient desaturation to 889% kira-HD  Today awake, non verbal, breathing comfortably on nasal O2 with O2 sat 97%.     Medications:  Vital Signs Last 24 Hrs  T(C): 37.4 (17 Feb 2018 04:02), Max: 37.4 (17 Feb 2018 04:02)  T(F): 99.4 (17 Feb 2018 04:02), Max: 99.4 (17 Feb 2018 04:02)  HR: 87 (17 Feb 2018 09:03) (83 - 97)  BP: 142/50 (17 Feb 2018 09:03) (121/68 - 162/48)  BP(mean): --  RR: 18 (17 Feb 2018 06:32) (18 - 19)  SpO2: 99% (17 Feb 2018 06:47) (95% - 100%)  ABG - ( 17 Feb 2018 06:30 )  pH: 7.48  /  pCO2: 38    /  pO2: 188   / HCO3: 28    / Base Excess: 4.2   /  SaO2: 101       02-16 @ 07:01  -  02-17 @ 07:00  --------------------------------------------------------  IN: 700 mL / OUT: 0 mL / NET: 700 mL        LABS:                        6.9    6.29  )-----------( 227      ( 17 Feb 2018 08:54 )             21.5     02-16    135  |  96  |  51<H>  ----------------------------<  178<H>  4.3   |  27  |  3.51<H>    Ca    8.3<L>      16 Feb 2018 17:46  Phos  5.2     02-16  Mg     2.5     02-16    TPro  6.9  /  Alb  2.7<L>  /  TBili  0.4  /  DBili  x   /  AST  61<H>  /  ALT  77<H>  /  AlkPhos  439<H>  02-16      PT/INR - ( 16 Feb 2018 17:46 )   PT: 9.7 sec;   INR: 0.89 ratio         PTT - ( 16 Feb 2018 17:46 )  PTT:24.9 sec    CULTURES:    Physical Examination:  PULM: No wheeze or rhonchi  CVS: Regular rate and rhythm, no murmurs, rubs, or gallops  ABD: Soft, non-tender  EXT:  No clubbing, cyanosis, or edema    RADIOLOGY REVIEWED  CXR:    CT chest:    TTE:

## 2018-02-17 NOTE — PROGRESS NOTE ADULT - SUBJECTIVE AND OBJECTIVE BOX
[facility] RRT note reviewed.  Currently, pt awake and occasionally scratching near rt IJ catheter site    Vital Signs Last 24 Hrs  T(C): 37.4 (17 Feb 2018 04:02), Max: 37.4 (17 Feb 2018 04:02)  T(F): 99.4 (17 Feb 2018 04:02), Max: 99.4 (17 Feb 2018 04:02)  HR: 87 (17 Feb 2018 09:03) (83 - 97)  BP: 142/50 (17 Feb 2018 09:03) (121/68 - 162/48)  BP(mean): --  RR: 18 (17 Feb 2018 06:32) (18 - 19)  SpO2: 99% (17 Feb 2018 06:47) (95% - 100%)    GENERAL: NAD, on NC this morning  HEAD:  Atraumatic, Normocephalic  EYES: EOMI  NECK: Supple, No JVD, No LAD, rt IJ nontunneled HD catheter  CHEST/LUNG: decreased BS at bases  HEART: Regular rate and rhythm; nl S1/S2  ABDOMEN: Soft, Nontender, Nondistended; Bowel sounds present  EXTREMITIES:  2+ Peripheral Pulses, No LE edema  SKIN: (+) macular rash chest and arms    LABS:                        7.7    7.1   )-----------( 216      ( 16 Feb 2018 17:46 )             23.6     02-16    135  |  96  |  51<H>  ----------------------------<  178<H>  4.3   |  27  |  3.51<H>    Ca    8.3<L>      16 Feb 2018 17:46  Phos  5.2     02-16  Mg     2.5     02-16    TPro  6.9  /  Alb  2.7<L>  /  TBili  0.4  /  DBili  x   /  AST  61<H>  /  ALT  77<H>  /  AlkPhos  439<H>  02-16    PT/INR - ( 16 Feb 2018 17:46 )   PT: 9.7 sec;   INR: 0.89 ratio         PTT - ( 16 Feb 2018 17:46 )  PTT:24.9 sec  CAPILLARY BLOOD GLUCOSE      POCT Blood Glucose.: 137 mg/dL (17 Feb 2018 06:02)  POCT Blood Glucose.: 132 mg/dL (17 Feb 2018 00:37)  POCT Blood Glucose.: 138 mg/dL (16 Feb 2018 20:10)  POCT Blood Glucose.: 109 mg/dL (16 Feb 2018 17:07)  POCT Blood Glucose.: 230 mg/dL (16 Feb 2018 13:06)    CARDIAC MARKERS ( 16 Feb 2018 17:46 )  x     / 0.03 ng/mL / 15 U/L / x     / 2.0 ng/mL

## 2018-02-18 LAB
ANION GAP SERPL CALC-SCNC: 13 MMOL/L — SIGNIFICANT CHANGE UP (ref 5–17)
BUN SERPL-MCNC: 38 MG/DL — HIGH (ref 7–23)
CALCIUM SERPL-MCNC: 8.9 MG/DL — SIGNIFICANT CHANGE UP (ref 8.4–10.5)
CHLORIDE SERPL-SCNC: 99 MMOL/L — SIGNIFICANT CHANGE UP (ref 96–108)
CO2 SERPL-SCNC: 29 MMOL/L — SIGNIFICANT CHANGE UP (ref 22–31)
CREAT SERPL-MCNC: 2.82 MG/DL — HIGH (ref 0.5–1.3)
GLUCOSE BLDC GLUCOMTR-MCNC: 215 MG/DL — HIGH (ref 70–99)
GLUCOSE BLDC GLUCOMTR-MCNC: 226 MG/DL — HIGH (ref 70–99)
GLUCOSE BLDC GLUCOMTR-MCNC: 268 MG/DL — HIGH (ref 70–99)
GLUCOSE SERPL-MCNC: 197 MG/DL — HIGH (ref 70–99)
HCT VFR BLD CALC: 23.1 % — LOW (ref 34.5–45)
HGB BLD-MCNC: 7.2 G/DL — LOW (ref 11.5–15.5)
MCHC RBC-ENTMCNC: 29.6 PG — SIGNIFICANT CHANGE UP (ref 27–34)
MCHC RBC-ENTMCNC: 31.2 GM/DL — LOW (ref 32–36)
MCV RBC AUTO: 95.1 FL — SIGNIFICANT CHANGE UP (ref 80–100)
PLATELET # BLD AUTO: 216 K/UL — SIGNIFICANT CHANGE UP (ref 150–400)
POTASSIUM SERPL-MCNC: 4.4 MMOL/L — SIGNIFICANT CHANGE UP (ref 3.5–5.3)
POTASSIUM SERPL-SCNC: 4.4 MMOL/L — SIGNIFICANT CHANGE UP (ref 3.5–5.3)
RBC # BLD: 2.43 M/UL — LOW (ref 3.8–5.2)
RBC # FLD: 18.6 % — HIGH (ref 10.3–14.5)
SODIUM SERPL-SCNC: 141 MMOL/L — SIGNIFICANT CHANGE UP (ref 135–145)
WBC # BLD: 5.41 K/UL — SIGNIFICANT CHANGE UP (ref 3.8–10.5)
WBC # FLD AUTO: 5.41 K/UL — SIGNIFICANT CHANGE UP (ref 3.8–10.5)

## 2018-02-18 RX ADMIN — CARVEDILOL PHOSPHATE 12.5 MILLIGRAM(S): 80 CAPSULE, EXTENDED RELEASE ORAL at 17:56

## 2018-02-18 RX ADMIN — CARVEDILOL PHOSPHATE 12.5 MILLIGRAM(S): 80 CAPSULE, EXTENDED RELEASE ORAL at 05:43

## 2018-02-18 RX ADMIN — Medication 3 MILLILITER(S): at 12:06

## 2018-02-18 RX ADMIN — Medication 25 MILLIGRAM(S): at 14:18

## 2018-02-18 RX ADMIN — Medication 6: at 12:06

## 2018-02-18 RX ADMIN — Medication 25 MILLIGRAM(S): at 22:01

## 2018-02-18 RX ADMIN — HEPARIN SODIUM 5000 UNIT(S): 5000 INJECTION INTRAVENOUS; SUBCUTANEOUS at 05:42

## 2018-02-18 RX ADMIN — Medication 4: at 06:54

## 2018-02-18 RX ADMIN — HUMAN INSULIN 20 UNIT(S): 100 INJECTION, SUSPENSION SUBCUTANEOUS at 17:56

## 2018-02-18 RX ADMIN — Medication 1 APPLICATION(S): at 17:56

## 2018-02-18 RX ADMIN — HEPARIN SODIUM 5000 UNIT(S): 5000 INJECTION INTRAVENOUS; SUBCUTANEOUS at 12:06

## 2018-02-18 RX ADMIN — Medication 3 MILLILITER(S): at 21:23

## 2018-02-18 RX ADMIN — NYSTATIN CREAM 1 APPLICATION(S): 100000 CREAM TOPICAL at 17:56

## 2018-02-18 RX ADMIN — Medication 4: at 17:56

## 2018-02-18 RX ADMIN — Medication 1 APPLICATION(S): at 05:43

## 2018-02-18 RX ADMIN — Medication 3 MILLILITER(S): at 17:55

## 2018-02-18 RX ADMIN — HUMAN INSULIN 20 UNIT(S): 100 INJECTION, SUSPENSION SUBCUTANEOUS at 12:07

## 2018-02-18 RX ADMIN — Medication 81 MILLIGRAM(S): at 12:06

## 2018-02-18 RX ADMIN — NYSTATIN CREAM 1 APPLICATION(S): 100000 CREAM TOPICAL at 05:42

## 2018-02-18 RX ADMIN — HEPARIN SODIUM 5000 UNIT(S): 5000 INJECTION INTRAVENOUS; SUBCUTANEOUS at 18:05

## 2018-02-18 RX ADMIN — Medication 3 MILLILITER(S): at 05:42

## 2018-02-18 NOTE — PROGRESS NOTE ADULT - SUBJECTIVE AND OBJECTIVE BOX
Failed bedside speech swallow yesterday.  Sleeping this AM    Vital Signs Last 24 Hrs  T(C): 36.8 (18 Feb 2018 04:21), Max: 37.1 (17 Feb 2018 11:10)  T(F): 98.2 (18 Feb 2018 04:21), Max: 98.8 (17 Feb 2018 11:10)  HR: 76 (18 Feb 2018 06:52) (76 - 97)  BP: 120/71 (18 Feb 2018 06:52) (120/71 - 159/65)  BP(mean): --  RR: 17 (18 Feb 2018 06:52) (17 - 18)  SpO2: 99% (18 Feb 2018 06:52) (98% - 100%)    GENERAL: NAD, on NC this morning  HEAD:  Atraumatic, Normocephalic  EYES: EOMI  NECK: Supple, No JVD, No LAD, rt IJ nontunneled HD catheter  CHEST/LUNG: decreased BS at bases  HEART: Regular rate and rhythm; nl S1/S2  ABDOMEN: Soft, Nontender, Nondistended; Bowel sounds present  EXTREMITIES:  2+ Peripheral Pulses, No LE edema  SKIN: (+) macular rash chest and arms; also near tape on rt lower cheek     LABS:                        7.2    5.41  )-----------( 216      ( 18 Feb 2018 06:33 )             23.1     02-18    141  |  99  |  38<H>  ----------------------------<  197<H>  4.4   |  29  |  2.82<H>    Ca    8.9      18 Feb 2018 06:36  Phos  7.0     02-17  Mg     2.8     02-17    TPro  6.4  /  Alb  2.9<L>  /  TBili  0.5  /  DBili  x   /  AST  36  /  ALT  54<H>  /  AlkPhos  360<H>  02-17    PT/INR - ( 16 Feb 2018 17:46 )   PT: 9.7 sec;   INR: 0.89 ratio         PTT - ( 16 Feb 2018 17:46 )  PTT:24.9 sec  CAPILLARY BLOOD GLUCOSE      POCT Blood Glucose.: 215 mg/dL (18 Feb 2018 06:00)  POCT Blood Glucose.: 273 mg/dL (17 Feb 2018 23:39)  POCT Blood Glucose.: 148 mg/dL (17 Feb 2018 18:07)  POCT Blood Glucose.: 168 mg/dL (17 Feb 2018 11:59)    CARDIAC MARKERS ( 16 Feb 2018 17:46 )  x     / 0.03 ng/mL / 15 U/L / x     / 2.0 ng/mL

## 2018-02-19 DIAGNOSIS — E87.8 OTHER DISORDERS OF ELECTROLYTE AND FLUID BALANCE, NOT ELSEWHERE CLASSIFIED: ICD-10-CM

## 2018-02-19 DIAGNOSIS — R50.9 FEVER, UNSPECIFIED: ICD-10-CM

## 2018-02-19 DIAGNOSIS — R34 ANURIA AND OLIGURIA: ICD-10-CM

## 2018-02-19 LAB
ANION GAP SERPL CALC-SCNC: 14 MMOL/L — SIGNIFICANT CHANGE UP (ref 5–17)
BUN SERPL-MCNC: 65 MG/DL — HIGH (ref 7–23)
CALCIUM SERPL-MCNC: 9.4 MG/DL — SIGNIFICANT CHANGE UP (ref 8.4–10.5)
CHLORIDE SERPL-SCNC: 98 MMOL/L — SIGNIFICANT CHANGE UP (ref 96–108)
CO2 SERPL-SCNC: 27 MMOL/L — SIGNIFICANT CHANGE UP (ref 22–31)
CREAT SERPL-MCNC: 4.29 MG/DL — HIGH (ref 0.5–1.3)
GLUCOSE BLDC GLUCOMTR-MCNC: 170 MG/DL — HIGH (ref 70–99)
GLUCOSE BLDC GLUCOMTR-MCNC: 259 MG/DL — HIGH (ref 70–99)
GLUCOSE BLDC GLUCOMTR-MCNC: 284 MG/DL — HIGH (ref 70–99)
GLUCOSE BLDC GLUCOMTR-MCNC: 337 MG/DL — HIGH (ref 70–99)
GLUCOSE BLDC GLUCOMTR-MCNC: 347 MG/DL — HIGH (ref 70–99)
GLUCOSE SERPL-MCNC: 293 MG/DL — HIGH (ref 70–99)
HCT VFR BLD CALC: 23.7 % — LOW (ref 34.5–45)
HGB BLD-MCNC: 7.3 G/DL — LOW (ref 11.5–15.5)
MCHC RBC-ENTMCNC: 29.2 PG — SIGNIFICANT CHANGE UP (ref 27–34)
MCHC RBC-ENTMCNC: 30.8 GM/DL — LOW (ref 32–36)
MCV RBC AUTO: 94.8 FL — SIGNIFICANT CHANGE UP (ref 80–100)
PLATELET # BLD AUTO: 228 K/UL — SIGNIFICANT CHANGE UP (ref 150–400)
POTASSIUM SERPL-MCNC: 5.6 MMOL/L — HIGH (ref 3.5–5.3)
POTASSIUM SERPL-SCNC: 5.6 MMOL/L — HIGH (ref 3.5–5.3)
RAPID RVP RESULT: SIGNIFICANT CHANGE UP
RBC # BLD: 2.5 M/UL — LOW (ref 3.8–5.2)
RBC # FLD: 18.8 % — HIGH (ref 10.3–14.5)
SODIUM SERPL-SCNC: 139 MMOL/L — SIGNIFICANT CHANGE UP (ref 135–145)
WBC # BLD: 6.84 K/UL — SIGNIFICANT CHANGE UP (ref 3.8–10.5)
WBC # FLD AUTO: 6.84 K/UL — SIGNIFICANT CHANGE UP (ref 3.8–10.5)

## 2018-02-19 PROCEDURE — 71045 X-RAY EXAM CHEST 1 VIEW: CPT | Mod: 26

## 2018-02-19 PROCEDURE — 90935 HEMODIALYSIS ONE EVALUATION: CPT | Mod: GC

## 2018-02-19 PROCEDURE — 93970 EXTREMITY STUDY: CPT | Mod: 26

## 2018-02-19 RX ORDER — IPRATROPIUM/ALBUTEROL SULFATE 18-103MCG
3 AEROSOL WITH ADAPTER (GRAM) INHALATION THREE TIMES A DAY
Qty: 0 | Refills: 0 | Status: DISCONTINUED | OUTPATIENT
Start: 2018-02-19 | End: 2018-02-21

## 2018-02-19 RX ORDER — ACETAMINOPHEN 500 MG
650 TABLET ORAL ONCE
Qty: 0 | Refills: 0 | Status: COMPLETED | OUTPATIENT
Start: 2018-02-19 | End: 2018-02-19

## 2018-02-19 RX ADMIN — Medication 6: at 08:13

## 2018-02-19 RX ADMIN — HUMAN INSULIN 20 UNIT(S): 100 INJECTION, SUSPENSION SUBCUTANEOUS at 18:57

## 2018-02-19 RX ADMIN — HEPARIN SODIUM 5000 UNIT(S): 5000 INJECTION INTRAVENOUS; SUBCUTANEOUS at 11:11

## 2018-02-19 RX ADMIN — HEPARIN SODIUM 5000 UNIT(S): 5000 INJECTION INTRAVENOUS; SUBCUTANEOUS at 05:26

## 2018-02-19 RX ADMIN — Medication 3 MILLILITER(S): at 18:20

## 2018-02-19 RX ADMIN — CARVEDILOL PHOSPHATE 12.5 MILLIGRAM(S): 80 CAPSULE, EXTENDED RELEASE ORAL at 18:20

## 2018-02-19 RX ADMIN — Medication 8: at 18:57

## 2018-02-19 RX ADMIN — NYSTATIN CREAM 1 APPLICATION(S): 100000 CREAM TOPICAL at 18:21

## 2018-02-19 RX ADMIN — Medication 1 APPLICATION(S): at 05:27

## 2018-02-19 RX ADMIN — Medication 25 MILLIGRAM(S): at 05:26

## 2018-02-19 RX ADMIN — HUMAN INSULIN 20 UNIT(S): 100 INJECTION, SUSPENSION SUBCUTANEOUS at 00:38

## 2018-02-19 RX ADMIN — Medication 2: at 12:57

## 2018-02-19 RX ADMIN — Medication 650 MILLIGRAM(S): at 05:08

## 2018-02-19 RX ADMIN — Medication 3 MILLILITER(S): at 22:19

## 2018-02-19 RX ADMIN — CARVEDILOL PHOSPHATE 12.5 MILLIGRAM(S): 80 CAPSULE, EXTENDED RELEASE ORAL at 05:26

## 2018-02-19 RX ADMIN — Medication 81 MILLIGRAM(S): at 18:21

## 2018-02-19 RX ADMIN — NYSTATIN CREAM 1 APPLICATION(S): 100000 CREAM TOPICAL at 05:26

## 2018-02-19 RX ADMIN — Medication 1 APPLICATION(S): at 18:21

## 2018-02-19 RX ADMIN — HEPARIN SODIUM 5000 UNIT(S): 5000 INJECTION INTRAVENOUS; SUBCUTANEOUS at 18:20

## 2018-02-19 RX ADMIN — HUMAN INSULIN 20 UNIT(S): 100 INJECTION, SUSPENSION SUBCUTANEOUS at 12:58

## 2018-02-19 RX ADMIN — Medication 3 MILLILITER(S): at 05:26

## 2018-02-19 RX ADMIN — Medication 6: at 00:38

## 2018-02-19 NOTE — PROGRESS NOTE ADULT - SUBJECTIVE AND OBJECTIVE BOX
100.9 last night, blood/urine cultures testing    Vital Signs Last 24 Hrs  T(C): 36.5 (19 Feb 2018 08:42), Max: 38.3 (19 Feb 2018 04:19)  T(F): 97.7 (19 Feb 2018 08:42), Max: 100.9 (19 Feb 2018 04:19)  HR: 89 (19 Feb 2018 09:57) (85 - 99)  BP: 150/72 (19 Feb 2018 04:19) (130/69 - 150/72)  BP(mean): --  RR: 18 (19 Feb 2018 04:19) (18 - 18)  SpO2: 96% (19 Feb 2018 09:57) (96% - 99%)    GENERAL: NAD, on NC this morning  HEAD:  Atraumatic, Normocephalic  EYES: EOMI  NECK: Supple, No JVD, No LAD, rt IJ nontunneled HD catheter  CHEST/LUNG: decreased BS at bases  HEART: Regular rate and rhythm; nl S1/S2  ABDOMEN: Soft, Nontender, Nondistended; Bowel sounds present  EXTREMITIES:  2+ Peripheral Pulses, No LE edema  SKIN: (+) macular rash chest and arms; also near tape on rt lower cheek     LABS:                        7.3    6.84  )-----------( 228      ( 19 Feb 2018 06:39 )             23.7     02-19    139  |  98  |  65<H>  ----------------------------<  293<H>  5.6<H>   |  27  |  4.29<H>    Ca    9.4      19 Feb 2018 07:40        CAPILLARY BLOOD GLUCOSE      POCT Blood Glucose.: 259 mg/dL (19 Feb 2018 06:59)  POCT Blood Glucose.: 284 mg/dL (19 Feb 2018 00:30)  POCT Blood Glucose.: 226 mg/dL (18 Feb 2018 17:53)  POCT Blood Glucose.: 268 mg/dL (18 Feb 2018 11:39)

## 2018-02-19 NOTE — PROGRESS NOTE ADULT - ASSESSMENT
68y/o Female with hx Breast Cancer (diagnosed in Feb 2017) currently on Herceptin (last dose Jan 20th), HTN, Diabetes on 70/30, presenting with influenza AH3, PEA arrest in ED likely 2' hypoxia requiring intubation which was complicated by pneumothorax and pneumoperitoneam requiring chest tubes and peritoneal drain.  Also developed RUSS requiring initiation of HD, and contact dermatitis.  Also found to have acute CVA.

## 2018-02-19 NOTE — PROGRESS NOTE ADULT - PROBLEM SELECTOR PLAN 3
presumably 2' hypoxia/hypovolemic shock in setting of influenza AH3  TTE did not reveal endocardium well but she has no prior hx of PE or CAD  LFTs 2' shock liver are almost normalized

## 2018-02-19 NOTE — PROGRESS NOTE ADULT - SUBJECTIVE AND OBJECTIVE BOX
[facility] DIVISION OF KIDNEY DISEASES AND HYPERTENSION -- HEMODIALYSIS NOTE  --------------------------------------------------------------------------------  Chief Complaint; Ongoing hemodialysis requirement    24 hour events/subjective:  Patient receiving hemodialysis        PAST HISTORY  --------------------------------------------------------------------------------  No significant changes to PMH, PSH, FHx, SHx, unless otherwise noted    ALLERGIES & MEDICATIONS  --------------------------------------------------------------------------------  Allergies    doxycycline (Rash)  isoniazid (Rash)  NIFEdipine (Urticaria; Hives)  vitamin E (Short breath; Urticaria; Hives)    Intolerances      Standing Inpatient Medications  ALBUTerol/ipratropium for Nebulization 3 milliLiter(s) Nebulizer every 6 hours  aspirin  chewable 81 milliGRAM(s) Oral daily  carvedilol 12.5 milliGRAM(s) Oral every 12 hours  dextrose 5%. 1000 milliLiter(s) IV Continuous <Continuous>  dextrose 5%. 1000 milliLiter(s) IV Continuous <Continuous>  dextrose 50% Injectable 12.5 Gram(s) IV Push once  dextrose 50% Injectable 25 Gram(s) IV Push once  dextrose 50% Injectable 25 Gram(s) IV Push once  dextrose 50% Injectable 12.5 Gram(s) IV Push once  dextrose 50% Injectable 25 Gram(s) IV Push once  dextrose 50% Injectable 25 Gram(s) IV Push once  heparin  Injectable 5000 Unit(s) SubCutaneous every 8 hours  hydrALAZINE 25 milliGRAM(s) Oral every 8 hours  insulin lispro (HumaLOG) corrective regimen sliding scale   SubCutaneous every 6 hours  insulin NPH human recombinant 20 Unit(s) SubCutaneous <User Schedule>  nystatin Powder 1 Application(s) Topical two times a day  triamcinolone 0.1% Ointment 1 Application(s) Topical two times a day    PRN Inpatient Medications  chlorhexidine 0.12% Liquid 15 milliLiter(s) Swish and Spit every 4 hours PRN  dextrose Gel 1 Dose(s) Oral once PRN  dextrose Gel 1 Dose(s) Oral once PRN  glucagon  Injectable 1 milliGRAM(s) IntraMuscular once PRN  glucagon  Injectable 1 milliGRAM(s) IntraMuscular once PRN      REVIEW OF SYSTEMS  --------------------------------------------------------------------------------  unable to obtain      Vital Signs Last 24 Hrs  T(C): 36.9 (02-19-18 @ 12:05)  T(F): 98.4 (02-19-18 @ 12:05), Max: 100.9 (02-19-18 @ 04:19)  HR: 85 (02-19-18 @ 13:25) (78 - 99)  BP: 126/54 (02-19-18 @ 13:25)  RR: 18 (02-19-18 @ 13:25) (18 - 18)  SpO2: 97% (02-19-18 @ 13:25) (96% - 99%)      02-18 @ 07:01  -  02-19 @ 07:00  --------------------------------------------------------  IN: 800 mL / OUT: 0 mL / NET: 800 mL      Physical Exam:  	Gen: NAD  	Pulm: CTA B/L  	CV: RRR, S1S2; no rub  	Abd: +BS, soft, nontender/nondistended  	: No suprapubic tenderness  	UE: Warm,no edema; no asterixis  	LE: Warm,  no edema  	Skin: Warm, without rashes  	Vascular access: RIJ non tunneled catheter functioning well      LABS/STUDIES  --------------------------------------------------------------------------------              7.3    6.84  >-----------<  228      [02-19-18 @ 06:39]              23.7     139  |  98  |  65  ----------------------------<  293      [02-19-18 @ 07:40]  5.6   |  27  |  4.29        Ca     9.4     [02-19-18 @ 07:40]            Creatinine Trend:  SCr 4.29 [02-19 @ 07:40]  SCr 2.82 [02-18 @ 06:36]  SCr 4.62 [02-17 @ 08:54]  SCr 3.51 [02-16 @ 17:46]  SCr 5.47 [02-16 @ 16:15]

## 2018-02-19 NOTE — PROGRESS NOTE ADULT - ASSESSMENT
New temp and rhonchi on exam: r/o aspiration, r/o nosocomial viral infection  HYpoxemic and hypercapneic resp failure - clinically resolving  RUSS progressed to ESRD on HD  S/P CAC with rib fractures and bilateral pneumothoraces  CVA with corical and subcortical infarcts  s/p cardiac arrest    REC    Check CXR and viral PCR  Duoneb qid  Doppler read pending  HD per renal service

## 2018-02-19 NOTE — PROGRESS NOTE ADULT - ATTENDING COMMENTS
I was present during and reviewed clinical and lab data as well as assessment and plan as documented. Please contact if any additional questions with any change in clinical condition or on availability of any additional information or reports.

## 2018-02-19 NOTE — PROGRESS NOTE ADULT - SUBJECTIVE AND OBJECTIVE BOX
Follow-up Pulm Progress Note  Caleb Mon MD  [phone]    Events noted: Febrile last night to 100.9  Dopplers pending  No increased lethargy or toxicity  Family notes congested breathing    Vital Signs Last 24 Hrs  T(C): 36.5 (19 Feb 2018 08:42), Max: 38.3 (19 Feb 2018 04:19)  T(F): 97.7 (19 Feb 2018 08:42), Max: 100.9 (19 Feb 2018 04:19)  HR: 89 (19 Feb 2018 09:57) (85 - 99)  BP: 150/72 (19 Feb 2018 04:19) (130/69 - 150/72)  BP(mean): --  RR: 18 (19 Feb 2018 04:19) (18 - 18)  SpO2: 96% (19 Feb 2018 09:57) (96% - 99%)                          7.3    6.84  )-----------( 228      ( 19 Feb 2018 06:39 )             23.7       02-19    139  |  98  |  65<H>  ----------------------------<  293<H>  5.6<H>   |  27  |  4.29<H>    Ca    9.4      19 Feb 2018 07:40      CULTURES:    Physical Examination:  PULM: scattered rhonchi, n o wheeze  CVS: Regular rate and rhythm, no murmurs, rubs, or gallops  ABD: Soft, non-tender  EXT:  No clubbing, cyanosis, or edema    RADIOLOGY REVIEWED  CXR:    CT chest:    TTE:

## 2018-02-19 NOTE — CHART NOTE - NSCHARTNOTEFT_GEN_A_CORE
MEDICINE PA  4:30 AM  2/19     Notified by RN patient with temperature 100.9 axillary . Seen and examined patient at bedside. Patient is alert, NAD. Denies HA, CP, SOB, cough, N/V, or abd pain.    VITAL SIGNS:  T(C): 38.3 (02-19-18 @ 04:19), Max: 38.3 (02-19-18 @ 04:19)  HR: 99 (02-19-18 @ 04:19) (76 - 99)  BP: 150/72 (02-19-18 @ 04:19) (120/71 - 150/72)  RR: 18 (02-19-18 @ 04:19) (17 - 18)  SpO2: 96% (02-19-18 @ 04:19) (96% - 99%)      LABORATORY:                          7.2    5.41  )-----------( 216      ( 18 Feb 2018 06:33 )             23.1       02-18    141  |  99  |  38<H>  ----------------------------<  197<H>  4.4   |  29  |  2.82<H>    Ca    8.9      18 Feb 2018 06:36  Phos  7.0     02-17  Mg     2.8     02-17    TPro  6.4  /  Alb  2.9<L>  /  TBili  0.5  /  DBili  x   /  AST  36  /  ALT  54<H>  /  AlkPhos  360<H>  02-17      PHYSICAL EXAM:    Constitutional: AOx3. NAD.    Respiratory: clear lungs bilaterally. No wheezing, rhonchi, or crackles.    Cardiovascular: S1 S2. No murmurs.    Gastrointestinal: BS X4 active. soft. nontender.    Extremities/Vascular: +2 pulses bilaterally. No BLE edema.      ASSESSMENT/PLAN:   HPI:  66y/o F hx Breast Cancer (diagnosed in Feb 2017) currently on Herceptin (last dose Jan 20th), HTN, Diabetes on 70/30, presenting with influenza AH3, PEA arrest in ED likely 2' hypoxia requiring intubation which was complicated by pneumothorax and pneumoperitoneam requiring chest tubes and peritoneal drain.  Also developed RUSS requiring initiation of HD, and contact dermatitis.  Also found to have acute CVA. Pt. now noted to be febrile to 100.9.         1) Fever  -tylenol and cooling measures prn for pyrexia  -BC x2, UA/UC - will follow up as pt. does not make urine   -F/U primary team in AM    Mel Arguello PA-C  #[id number]

## 2018-02-19 NOTE — PROGRESS NOTE ADULT - ASSESSMENT
I have seen the patient and reviewed dialysis prescription and flow sheet. Dialysis access is functioning well. Patient is tolerating dialysis well with no acute symptoms or distress. Dialysis prescription has been adjusted for optimized control of volume status, uremia and electrolytes. Management of additional metabolic abnormalities/anemia will continue to be addressed on follow up.    Patient is still oliguric and fluid removal limited by hypotension.  Will attempt to do UF on 2/20/18.  Nurse will do bladder scan to check for bladder volume.

## 2018-02-20 DIAGNOSIS — E83.39 OTHER DISORDERS OF PHOSPHORUS METABOLISM: ICD-10-CM

## 2018-02-20 DIAGNOSIS — D64.9 ANEMIA, UNSPECIFIED: ICD-10-CM

## 2018-02-20 LAB
ANION GAP SERPL CALC-SCNC: 9 MMOL/L — SIGNIFICANT CHANGE UP (ref 5–17)
BLD GP AB SCN SERPL QL: NEGATIVE — SIGNIFICANT CHANGE UP
BUN SERPL-MCNC: 51 MG/DL — HIGH (ref 7–23)
CALCIUM SERPL-MCNC: 8.7 MG/DL — SIGNIFICANT CHANGE UP (ref 8.4–10.5)
CHLORIDE SERPL-SCNC: 94 MMOL/L — LOW (ref 96–108)
CO2 SERPL-SCNC: 32 MMOL/L — HIGH (ref 22–31)
CREAT SERPL-MCNC: 3.02 MG/DL — HIGH (ref 0.5–1.3)
GLUCOSE BLDC GLUCOMTR-MCNC: 181 MG/DL — HIGH (ref 70–99)
GLUCOSE BLDC GLUCOMTR-MCNC: 208 MG/DL — HIGH (ref 70–99)
GLUCOSE BLDC GLUCOMTR-MCNC: 258 MG/DL — HIGH (ref 70–99)
GLUCOSE BLDC GLUCOMTR-MCNC: 262 MG/DL — HIGH (ref 70–99)
GLUCOSE BLDC GLUCOMTR-MCNC: 285 MG/DL — HIGH (ref 70–99)
GLUCOSE BLDC GLUCOMTR-MCNC: 337 MG/DL — HIGH (ref 70–99)
GLUCOSE SERPL-MCNC: 296 MG/DL — HIGH (ref 70–99)
HCT VFR BLD CALC: 21.2 % — LOW (ref 34.5–45)
HCT VFR BLD CALC: 21.9 % — LOW (ref 34.5–45)
HGB BLD-MCNC: 6.4 G/DL — CRITICAL LOW (ref 11.5–15.5)
HGB BLD-MCNC: 7.1 G/DL — LOW (ref 11.5–15.5)
MCHC RBC-ENTMCNC: 29.2 PG — SIGNIFICANT CHANGE UP (ref 27–34)
MCHC RBC-ENTMCNC: 30.2 GM/DL — LOW (ref 32–36)
MCHC RBC-ENTMCNC: 31 PG — SIGNIFICANT CHANGE UP (ref 27–34)
MCHC RBC-ENTMCNC: 32.2 GM/DL — SIGNIFICANT CHANGE UP (ref 32–36)
MCV RBC AUTO: 96.3 FL — SIGNIFICANT CHANGE UP (ref 80–100)
MCV RBC AUTO: 96.8 FL — SIGNIFICANT CHANGE UP (ref 80–100)
PLATELET # BLD AUTO: 124 K/UL — LOW (ref 150–400)
PLATELET # BLD AUTO: 183 K/UL — SIGNIFICANT CHANGE UP (ref 150–400)
POTASSIUM SERPL-MCNC: 5 MMOL/L — SIGNIFICANT CHANGE UP (ref 3.5–5.3)
POTASSIUM SERPL-SCNC: 5 MMOL/L — SIGNIFICANT CHANGE UP (ref 3.5–5.3)
RBC # BLD: 2.19 M/UL — LOW (ref 3.8–5.2)
RBC # BLD: 2.28 M/UL — LOW (ref 3.8–5.2)
RBC # FLD: 15.3 % — HIGH (ref 10.3–14.5)
RBC # FLD: 18 % — HIGH (ref 10.3–14.5)
RH IG SCN BLD-IMP: POSITIVE — SIGNIFICANT CHANGE UP
SODIUM SERPL-SCNC: 135 MMOL/L — SIGNIFICANT CHANGE UP (ref 135–145)
WBC # BLD: 6.4 K/UL — SIGNIFICANT CHANGE UP (ref 3.8–10.5)
WBC # BLD: 7.28 K/UL — SIGNIFICANT CHANGE UP (ref 3.8–10.5)
WBC # FLD AUTO: 6.4 K/UL — SIGNIFICANT CHANGE UP (ref 3.8–10.5)
WBC # FLD AUTO: 7.28 K/UL — SIGNIFICANT CHANGE UP (ref 3.8–10.5)

## 2018-02-20 PROCEDURE — 92612 ENDOSCOPY SWALLOW (FEES) VID: CPT | Mod: GC,CL

## 2018-02-20 PROCEDURE — 99232 SBSQ HOSP IP/OBS MODERATE 35: CPT | Mod: GC

## 2018-02-20 PROCEDURE — 99232 SBSQ HOSP IP/OBS MODERATE 35: CPT

## 2018-02-20 RX ORDER — HUMAN INSULIN 100 [IU]/ML
24 INJECTION, SUSPENSION SUBCUTANEOUS
Qty: 0 | Refills: 0 | Status: DISCONTINUED | OUTPATIENT
Start: 2018-02-20 | End: 2018-02-21

## 2018-02-20 RX ADMIN — NYSTATIN CREAM 1 APPLICATION(S): 100000 CREAM TOPICAL at 05:09

## 2018-02-20 RX ADMIN — Medication 3 MILLILITER(S): at 05:09

## 2018-02-20 RX ADMIN — CARVEDILOL PHOSPHATE 12.5 MILLIGRAM(S): 80 CAPSULE, EXTENDED RELEASE ORAL at 20:55

## 2018-02-20 RX ADMIN — HEPARIN SODIUM 5000 UNIT(S): 5000 INJECTION INTRAVENOUS; SUBCUTANEOUS at 12:56

## 2018-02-20 RX ADMIN — Medication 6: at 06:07

## 2018-02-20 RX ADMIN — HEPARIN SODIUM 5000 UNIT(S): 5000 INJECTION INTRAVENOUS; SUBCUTANEOUS at 06:07

## 2018-02-20 RX ADMIN — Medication 25 MILLIGRAM(S): at 22:39

## 2018-02-20 RX ADMIN — HUMAN INSULIN 20 UNIT(S): 100 INJECTION, SUSPENSION SUBCUTANEOUS at 01:08

## 2018-02-20 RX ADMIN — Medication 1 APPLICATION(S): at 05:09

## 2018-02-20 RX ADMIN — HEPARIN SODIUM 5000 UNIT(S): 5000 INJECTION INTRAVENOUS; SUBCUTANEOUS at 18:46

## 2018-02-20 RX ADMIN — HUMAN INSULIN 24 UNIT(S): 100 INJECTION, SUSPENSION SUBCUTANEOUS at 18:46

## 2018-02-20 RX ADMIN — HUMAN INSULIN 20 UNIT(S): 100 INJECTION, SUSPENSION SUBCUTANEOUS at 12:56

## 2018-02-20 RX ADMIN — CARVEDILOL PHOSPHATE 12.5 MILLIGRAM(S): 80 CAPSULE, EXTENDED RELEASE ORAL at 06:07

## 2018-02-20 RX ADMIN — Medication 3 MILLILITER(S): at 12:26

## 2018-02-20 RX ADMIN — Medication 2: at 12:56

## 2018-02-20 RX ADMIN — HUMAN INSULIN 24 UNIT(S): 100 INJECTION, SUSPENSION SUBCUTANEOUS at 23:45

## 2018-02-20 RX ADMIN — Medication 25 MILLIGRAM(S): at 06:07

## 2018-02-20 RX ADMIN — Medication 81 MILLIGRAM(S): at 16:26

## 2018-02-20 RX ADMIN — Medication 6: at 23:46

## 2018-02-20 RX ADMIN — Medication 3 MILLILITER(S): at 22:39

## 2018-02-20 RX ADMIN — Medication 4: at 18:45

## 2018-02-20 RX ADMIN — Medication 1 APPLICATION(S): at 18:45

## 2018-02-20 RX ADMIN — NYSTATIN CREAM 1 APPLICATION(S): 100000 CREAM TOPICAL at 18:45

## 2018-02-20 RX ADMIN — Medication 8: at 01:09

## 2018-02-20 NOTE — PROGRESS NOTE ADULT - ATTENDING COMMENTS
No new complaints.  Sleepy, arousable and responsive  1.  ARF--still HD dependent.  Modest increase in urine output insufficient to maintain homeostasis.  Trend  2.  Anemia--transfuse on HD  3.  HyperPO4--diet,trend

## 2018-02-20 NOTE — PROGRESS NOTE ADULT - PROBLEM SELECTOR PLAN 2
Hb 6.4 today. Transfuse during HD.  Will initiate Epo once active bleeding source identified/corrected.

## 2018-02-20 NOTE — PROGRESS NOTE ADULT - SUBJECTIVE AND OBJECTIVE BOX
Fevers did not recur; feels congested when breathing    Vital Signs Last 24 Hrs  T(C): 37.1 (20 Feb 2018 05:28), Max: 37.1 (20 Feb 2018 05:28)  T(F): 98.7 (20 Feb 2018 05:28), Max: 98.7 (20 Feb 2018 05:28)  HR: 85 (20 Feb 2018 06:27) (78 - 89)  BP: 148/63 (20 Feb 2018 05:28) (101/44 - 148/63)  BP(mean): --  RR: 18 (20 Feb 2018 05:28) (17 - 18)  SpO2: 98% (20 Feb 2018 06:27) (96% - 100%)    GENERAL: NAD, on NC this morning  HEAD:  Atraumatic, Normocephalic  EYES: EOMI  NECK: Supple, No JVD, rt IJ nontunneled HD catheter  CHEST/LUNG: decreased BS at bases; some upper rhonchi  HEART: Regular rate and rhythm; nl S1/S2  ABDOMEN: Soft, Nontender, Nondistended; Bowel sounds present  EXTREMITIES:  2+ Peripheral Pulses, No LE edema  SKIN: (+) macular rash chest and arms; also near tape on rt lower cheek     LABS:                        6.4    7.28  )-----------( 183      ( 20 Feb 2018 07:22 )             21.2     02-20    135  |  94<L>  |  51<H>  ----------------------------<  296<H>  5.0   |  32<H>  |  3.02<H>    Ca    8.7      20 Feb 2018 07:21        CAPILLARY BLOOD GLUCOSE      POCT Blood Glucose.: 262 mg/dL (20 Feb 2018 06:03)  POCT Blood Glucose.: 337 mg/dL (20 Feb 2018 01:02)  POCT Blood Glucose.: 347 mg/dL (19 Feb 2018 18:28)  POCT Blood Glucose.: 337 mg/dL (19 Feb 2018 18:08)  POCT Blood Glucose.: 170 mg/dL (19 Feb 2018 12:29)

## 2018-02-20 NOTE — PROGRESS NOTE ADULT - PROBLEM SELECTOR PLAN 1
f/u pancultures; RVP is negative  venous dopplers are negative  repeat CXR did not show discrete consolidation or infiltrate

## 2018-02-20 NOTE — PROGRESS NOTE ADULT - PROBLEM SELECTOR PLAN 2
in setting of flu, PEA, pneumothorax  s/p successful extubation in MICU 2/8  now on NC daytime and BIPAP AVAP nocturnally  don't see any pneumothorax or discrete consolidation on latest CXR  appreciate pulm  continue duonebs in setting of flu, PEA, pneumothorax  s/p successful extubation in MICU 2/8  now on NC daytime and BIPAP AVAP nocturnally  don't see any pneumothorax or discrete consolidation on latest CXR  appreciate pulm  continue duonebs  accapella vest therapy

## 2018-02-20 NOTE — PROGRESS NOTE ADULT - ASSESSMENT
66 y/o F with T2DM uncontrolled on insulin, osteoporosis, HTN, here with acute respiratory failure 2/2 influenza s/p PEA arrest x 2, awiting fiberoptic swallow eval.

## 2018-02-20 NOTE — PROGRESS NOTE ADULT - PROBLEM SELECTOR PLAN 8
appreciate nephrology  on HD TIW appreciate nephrology  on HD TIW  will need permacath before discharge; perhaps after panculture is negative

## 2018-02-20 NOTE — PROGRESS NOTE ADULT - PROBLEM SELECTOR PLAN 1
likely from ATN in setting of cardiac arrest. Pt continues to be oligo anuric, reported to be incontinent x1. PVR with 100 cc urine. Continues to be dialysis dependant. Monitor for renal recovery.   PT NEEDS PERMANENT HD CATHETER FOR ONGOING HD REQUIREMENTS.

## 2018-02-20 NOTE — SWALLOW FEES ASSESSMENT ADULT - SLP GENERAL OBSERVATIONS
Pt asleep in bed, daughter present at bedside. Pt lethargic, unable to achieve or maintain wakefulness for exam despite maximal verbal, tactile, and noxious stimulation. KFT in place.

## 2018-02-20 NOTE — SWALLOW FEES ASSESSMENT ADULT - ORAL PHASE
pt lethargic, did not orient to feeding task. Further PO trials deferred as pt's mental status does not support PO intake.

## 2018-02-20 NOTE — PROGRESS NOTE ADULT - SUBJECTIVE AND OBJECTIVE BOX
Follow-up Pulm Progress Note  Caleb Mon MD  [phone]    AFebrile  CXR with incr in R>L effusions (small) with prob vasc congestion  LE Dopplers negative DVT  No increased lethargy or toxicity  PCR neg    Vital Signs Last 24 Hrs  T(C): 37.1 (20 Feb 2018 05:28), Max: 37.1 (20 Feb 2018 05:28)  T(F): 98.7 (20 Feb 2018 05:28), Max: 98.7 (20 Feb 2018 05:28)  HR: 90 (20 Feb 2018 09:23) (78 - 90)  BP: 148/63 (20 Feb 2018 05:28) (101/44 - 148/63)  BP(mean): --  RR: 18 (20 Feb 2018 05:28) (17 - 18)  SpO2: 96% (20 Feb 2018 09:23) (96% - 100%)                       6.4    7.28  )-----------( 183      ( 20 Feb 2018 07:22 )             21.2       02-20    135  |  94<L>  |  51<H>  ----------------------------<  296<H>  5.0   |  32<H>  |  3.02<H>    Ca    8.7      20 Feb 2018 07:21        CULTURES:    Physical Examination:  PULM: no sign wheeze or rhonchi  CVS: Regular rate and rhythm, no murmurs, rubs, or gallops  ABD: Soft, non-tender  EXT:  No clubbing, cyanosis, or edema    RADIOLOGY REVIEWED  CXR:    CT chest:    TTE:

## 2018-02-20 NOTE — PROGRESS NOTE ADULT - SUBJECTIVE AND OBJECTIVE BOX
Chief Complaint/Follow-up on: T2dm  Subjective: Pt is sleepy but arousable. Not saying much. Daughter notes that her topical cream makes her drowsy and she recently applied it to her mother's skin. Mrs. Barraza is thirsty and her mouth appears dry. The RN noted that her TF delivery was delayed today.  	 	    MEDICATIONS  (STANDING):  ALBUTerol/ipratropium for Nebulization 3 milliLiter(s) Nebulizer three times a day  aspirin  chewable 81 milliGRAM(s) Oral daily  carvedilol 12.5 milliGRAM(s) Oral every 12 hours  dextrose 5%. 1000 milliLiter(s) (50 mL/Hr) IV Continuous <Continuous>  dextrose 5%. 1000 milliLiter(s) (50 mL/Hr) IV Continuous <Continuous>  dextrose 50% Injectable 12.5 Gram(s) IV Push once  dextrose 50% Injectable 25 Gram(s) IV Push once  dextrose 50% Injectable 25 Gram(s) IV Push once  dextrose 50% Injectable 12.5 Gram(s) IV Push once  dextrose 50% Injectable 25 Gram(s) IV Push once  dextrose 50% Injectable 25 Gram(s) IV Push once  heparin  Injectable 5000 Unit(s) SubCutaneous every 8 hours  hydrALAZINE 25 milliGRAM(s) Oral every 8 hours  insulin lispro (HumaLOG) corrective regimen sliding scale   SubCutaneous every 6 hours  insulin NPH human recombinant 20 Unit(s) SubCutaneous <User Schedule>  nystatin Powder 1 Application(s) Topical two times a day  triamcinolone 0.1% Ointment 1 Application(s) Topical two times a day    MEDICATIONS  (PRN):  chlorhexidine 0.12% Liquid 15 milliLiter(s) Swish and Spit every 4 hours PRN mouth hygiene  dextrose Gel 1 Dose(s) Oral once PRN Blood Glucose LESS THAN 70 milliGRAM(s)/deciliter  dextrose Gel 1 Dose(s) Oral once PRN Blood Glucose LESS THAN 70 milliGRAM(s)/deciliter  glucagon  Injectable 1 milliGRAM(s) IntraMuscular once PRN Glucose LESS THAN 70 milligrams/deciliter  glucagon  Injectable 1 milliGRAM(s) IntraMuscular once PRN Glucose LESS THAN 70 milligrams/deciliter      PHYSICAL EXAM:  VITALS: T(C): 36.6 (02-20-18 @ 12:12)  T(F): 97.9 (02-20-18 @ 12:12), Max: 98.7 (02-20-18 @ 05:28)  HR: 79 (02-20-18 @ 12:12) (79 - 90)  BP: 121/71 (02-20-18 @ 12:12) (103/61 - 148/63)  RR:  (17 - 18)  SpO2:  (96% - 100%)  Wt(kg): --  GENERAL: NAD, well-groomed, well-developed  HEENT:  Atraumatic, Normocephalic, +dry lips, NGT in place  RESPIRATORY: Clear to auscultation bilaterally; No rales, rhonchi, wheezing, or rubs  CARDIOVASCULAR: Regular rate and rhythm; No murmurs; no peripheral edema  GI: Soft, nontender, non distended, normal bowel sounds      POCT Blood Glucose.: 181 mg/dL (02-20-18 @ 12:09)  POCT Blood Glucose.: 262 mg/dL (02-20-18 @ 06:03)  POCT Blood Glucose.: 337 mg/dL (02-20-18 @ 01:02)  POCT Blood Glucose.: 347 mg/dL (02-19-18 @ 18:28)  POCT Blood Glucose.: 337 mg/dL (02-19-18 @ 18:08)  POCT Blood Glucose.: 170 mg/dL (02-19-18 @ 12:29)  POCT Blood Glucose.: 259 mg/dL (02-19-18 @ 06:59)  POCT Blood Glucose.: 284 mg/dL (02-19-18 @ 00:30)  POCT Blood Glucose.: 226 mg/dL (02-18-18 @ 17:53)  POCT Blood Glucose.: 268 mg/dL (02-18-18 @ 11:39)  POCT Blood Glucose.: 215 mg/dL (02-18-18 @ 06:00)  POCT Blood Glucose.: 273 mg/dL (02-17-18 @ 23:39)  POCT Blood Glucose.: 148 mg/dL (02-17-18 @ 18:07)    02-20    135  |  94<L>  |  51<H>  ----------------------------<  296<H>  5.0   |  32<H>  |  3.02<H>    EGFR if : 18<L>  EGFR if non : 15<L>    Ca    8.7      02-20      Hemoglobin A1C, Whole Blood: 8.6 % <H> [4.0 - 5.6] (01-31-18 @ 07:15)  Hemoglobin A1C, Whole Blood: 8.7 % <H> [4.0 - 5.6] (01-31-18 @ 05:51)

## 2018-02-20 NOTE — PROGRESS NOTE ADULT - SUBJECTIVE AND OBJECTIVE BOX
[facility] DIVISION OF KIDNEY DISEASES AND HYPERTENSION -- FOLLOW UP NOTE  --------------------------------------------------------------------------------  Chief Complaint: Anuric RUSS    24 hour events/subjective:  pt incontinent x1    cc  tolerated HD with 1.4 L UF        PAST HISTORY  --------------------------------------------------------------------------------  No significant changes to PMH, PSH, FHx, SHx, unless otherwise noted    ALLERGIES & MEDICATIONS  --------------------------------------------------------------------------------  Allergies    doxycycline (Rash)  isoniazid (Rash)  NIFEdipine (Urticaria; Hives)  vitamin E (Short breath; Urticaria; Hives)    Intolerances      Standing Inpatient Medications  ALBUTerol/ipratropium for Nebulization 3 milliLiter(s) Nebulizer three times a day  aspirin  chewable 81 milliGRAM(s) Oral daily  carvedilol 12.5 milliGRAM(s) Oral every 12 hours  dextrose 5%. 1000 milliLiter(s) IV Continuous <Continuous>  dextrose 5%. 1000 milliLiter(s) IV Continuous <Continuous>  dextrose 50% Injectable 12.5 Gram(s) IV Push once  dextrose 50% Injectable 25 Gram(s) IV Push once  dextrose 50% Injectable 25 Gram(s) IV Push once  dextrose 50% Injectable 12.5 Gram(s) IV Push once  dextrose 50% Injectable 25 Gram(s) IV Push once  dextrose 50% Injectable 25 Gram(s) IV Push once  heparin  Injectable 5000 Unit(s) SubCutaneous every 8 hours  hydrALAZINE 25 milliGRAM(s) Oral every 8 hours  insulin lispro (HumaLOG) corrective regimen sliding scale   SubCutaneous every 6 hours  insulin NPH human recombinant 24 Unit(s) SubCutaneous <User Schedule>  nystatin Powder 1 Application(s) Topical two times a day  triamcinolone 0.1% Ointment 1 Application(s) Topical two times a day    PRN Inpatient Medications  chlorhexidine 0.12% Liquid 15 milliLiter(s) Swish and Spit every 4 hours PRN  dextrose Gel 1 Dose(s) Oral once PRN  dextrose Gel 1 Dose(s) Oral once PRN  glucagon  Injectable 1 milliGRAM(s) IntraMuscular once PRN  glucagon  Injectable 1 milliGRAM(s) IntraMuscular once PRN      REVIEW OF SYSTEMS  --------------------------------------------------------------------------------  unable to obtain    VITALS/PHYSICAL EXAM  --------------------------------------------------------------------------------  T(C): 36.6 (02-20-18 @ 12:12), Max: 37.1 (02-20-18 @ 05:28)  HR: 79 (02-20-18 @ 12:12) (79 - 90)  BP: 121/71 (02-20-18 @ 12:12) (103/61 - 148/63)  RR: 18 (02-20-18 @ 12:12) (17 - 18)  SpO2: 99% (02-20-18 @ 12:12) (96% - 99%)  Wt(kg): --        02-19-18 @ 07:01  -  02-20-18 @ 07:00  --------------------------------------------------------  IN: 2200 mL / OUT: 1400 mL / NET: 800 mL      Physical Exam:  	Gen: NAD,  	Pulm: CTA B/L  	CV: RRR, S1S2; no rub  	Abd: +BS, soft, nontender/nondistended  	: No suprapubic tenderness  	UE: Warm no edema; no asterixis  	LE: Warm, no edema  	Skin: Warm  	Vascular access: rt IJ non tunneled catheter     LABS/STUDIES  --------------------------------------------------------------------------------              6.4    7.28  >-----------<  183      [02-20-18 @ 07:22]              21.2     135  |  94  |  51  ----------------------------<  296      [02-20-18 @ 07:21]  5.0   |  32  |  3.02        Ca     8.7     [02-20-18 @ 07:21]            Creatinine Trend:  SCr 3.02 [02-20 @ 07:21]  SCr 4.29 [02-19 @ 07:40]  SCr 2.82 [02-18 @ 06:36]  SCr 4.62 [02-17 @ 08:54]  SCr 3.51 [02-16 @ 17:46]    Urinalysis - [01-30-18 @ 12:52]      Color Yellow / Appearance SL Turbid / SG 1.025 / pH 6.0      Gluc 50 / Ketone Negative  / Bili Negative / Urobili Negative       Blood Trace / Protein 150 / Leuk Est Negative / Nitrite Negative      RBC 3-5 / WBC  / Hyaline  / Gran  / Sq Epi  / Non Sq Epi OCC / Bacteria       HbA1c 8.6      [01-31-18 @ 07:15]  Lipid: chol 183, , HDL 10,       [02-07-18 @ 13:34]    HBsAg Nonreact      [02-01-18 @ 22:49]  HCV 0.16, Nonreact      [02-01-18 @ 22:49]

## 2018-02-20 NOTE — PROGRESS NOTE ADULT - PROBLEM SELECTOR PLAN 6
baby ASA  statin contraindicated 2' resolving transaminitis  FEEST Tuesday baby ASA  statin contraindicated 2' resolving transaminitis  FEEST Tuesday  PT

## 2018-02-20 NOTE — CHART NOTE - NSCHARTNOTEFT_GEN_A_CORE
Notified by RN; Hb returned at 7.1. Pt. is s/p 1 U of PRBC for Hb of 6.4. D/w on-call proheatlh physician Dr. May. No further transfusion at this point. Repeat CBC in AM. Continue to monitor pt. on telemetry. F/u w/ primary team in AM.    -Marylu Chamorro PA-C. #[id number].

## 2018-02-20 NOTE — PROGRESS NOTE ADULT - ASSESSMENT
Afebrile off abx without evidence of pna or nosocomoial viral infection: CXR w/w fluid overload and effusions  Prior TTE tech inadequate  HYpoxemic and hypercapneic resp failure - clinically resolving  RUSS progressed to ESRD on HD  S/P CAC with rib fractures and bilateral pneumothoraces  CVA with corical and subcortical infarcts  s/p cardiac arrest    REC    Duoneb qid  Negative balance with HD  Repeat TTE  Observe off abx

## 2018-02-20 NOTE — PROGRESS NOTE ADULT - SUBJECTIVE AND OBJECTIVE BOX
Chief Complaint/Follow-up on:     Subjective:    MEDICATIONS  (STANDING):  ALBUTerol/ipratropium for Nebulization 3 milliLiter(s) Nebulizer three times a day  aspirin  chewable 81 milliGRAM(s) Oral daily  carvedilol 12.5 milliGRAM(s) Oral every 12 hours  dextrose 5%. 1000 milliLiter(s) (50 mL/Hr) IV Continuous <Continuous>  dextrose 5%. 1000 milliLiter(s) (50 mL/Hr) IV Continuous <Continuous>  dextrose 50% Injectable 12.5 Gram(s) IV Push once  dextrose 50% Injectable 25 Gram(s) IV Push once  dextrose 50% Injectable 25 Gram(s) IV Push once  dextrose 50% Injectable 12.5 Gram(s) IV Push once  dextrose 50% Injectable 25 Gram(s) IV Push once  dextrose 50% Injectable 25 Gram(s) IV Push once  heparin  Injectable 5000 Unit(s) SubCutaneous every 8 hours  hydrALAZINE 25 milliGRAM(s) Oral every 8 hours  insulin lispro (HumaLOG) corrective regimen sliding scale   SubCutaneous every 6 hours  insulin NPH human recombinant 20 Unit(s) SubCutaneous <User Schedule>  nystatin Powder 1 Application(s) Topical two times a day  triamcinolone 0.1% Ointment 1 Application(s) Topical two times a day    MEDICATIONS  (PRN):  chlorhexidine 0.12% Liquid 15 milliLiter(s) Swish and Spit every 4 hours PRN mouth hygiene  dextrose Gel 1 Dose(s) Oral once PRN Blood Glucose LESS THAN 70 milliGRAM(s)/deciliter  dextrose Gel 1 Dose(s) Oral once PRN Blood Glucose LESS THAN 70 milliGRAM(s)/deciliter  glucagon  Injectable 1 milliGRAM(s) IntraMuscular once PRN Glucose LESS THAN 70 milligrams/deciliter  glucagon  Injectable 1 milliGRAM(s) IntraMuscular once PRN Glucose LESS THAN 70 milligrams/deciliter      PHYSICAL EXAM:  VITALS: T(C): 36.6 (02-20-18 @ 12:12)  T(F): 97.9 (02-20-18 @ 12:12), Max: 98.7 (02-20-18 @ 05:28)  HR: 79 (02-20-18 @ 12:12) (79 - 90)  BP: 121/71 (02-20-18 @ 12:12) (103/61 - 148/63)  RR:  (17 - 18)  SpO2:  (96% - 100%)  Wt(kg): --  GENERAL: NAD, well-groomed, well-developed  EYES: No proptosis, no injection  HEENT:  Atraumatic, Normocephalic, moist mucous membranes  THYROID: Normal size, no palpable nodules  RESPIRATORY: Clear to auscultation bilaterally; No rales, rhonchi, wheezing, or rubs  CARDIOVASCULAR: Regular rate and rhythm; No murmurs; no peripheral edema  GI: Soft, nontender, non distended, normal bowel sounds  CUSHING'S SIGNS: no striae    POCT Blood Glucose.: 181 mg/dL (02-20-18 @ 12:09)  POCT Blood Glucose.: 262 mg/dL (02-20-18 @ 06:03)  POCT Blood Glucose.: 337 mg/dL (02-20-18 @ 01:02)  POCT Blood Glucose.: 347 mg/dL (02-19-18 @ 18:28)  POCT Blood Glucose.: 337 mg/dL (02-19-18 @ 18:08)  POCT Blood Glucose.: 170 mg/dL (02-19-18 @ 12:29)  POCT Blood Glucose.: 259 mg/dL (02-19-18 @ 06:59)  POCT Blood Glucose.: 284 mg/dL (02-19-18 @ 00:30)  POCT Blood Glucose.: 226 mg/dL (02-18-18 @ 17:53)  POCT Blood Glucose.: 268 mg/dL (02-18-18 @ 11:39)  POCT Blood Glucose.: 215 mg/dL (02-18-18 @ 06:00)  POCT Blood Glucose.: 273 mg/dL (02-17-18 @ 23:39)  POCT Blood Glucose.: 148 mg/dL (02-17-18 @ 18:07)    02-20    135  |  94<L>  |  51<H>  ----------------------------<  296<H>  5.0   |  32<H>  |  3.02<H>    EGFR if : 18<L>  EGFR if non : 15<L>    Ca    8.7      02-20            Thyroid Function Tests:      Hemoglobin A1C, Whole Blood: 8.6 % <H> [4.0 - 5.6] (01-31-18 @ 07:15)  Hemoglobin A1C, Whole Blood: 8.7 % <H> [4.0 - 5.6] (01-31-18 @ 05:51)

## 2018-02-20 NOTE — PROGRESS NOTE ADULT - PROBLEM SELECTOR PLAN 9
kenalog prn; vaseline for facial areas
hydrocortisone cream prn
kenalog prn
kenalog prn; vaseline for facial areas
kenalog prn; vaseline for facial areas

## 2018-02-20 NOTE — PROGRESS NOTE ADULT - PROBLEM SELECTOR PLAN 1
-Check bs q6  -Increase NPH from 20 to 24 units sq at 12pm, 6pm, and 12am as TF are held from 6am-12pm.  -mod scale q6  Patria Weiner MD  [phone]

## 2018-02-20 NOTE — SWALLOW FEES ASSESSMENT ADULT - PRELIMINARY ENDOSCOPIC EXAMINATIONS
subglottic secretions vs other consider ENT f/u. +Thick bloody secretions stranding along BOT to oropharynx

## 2018-02-20 NOTE — SWALLOW FEES ASSESSMENT ADULT - ADDITIONAL RECOMMENDATIONS
Maintain good oral hygiene Maintain good oral hygiene  Consider repeat objective testing pending improvement in mental status.

## 2018-02-21 LAB
ALBUMIN SERPL ELPH-MCNC: 2.3 G/DL — LOW (ref 3.3–5)
ALBUMIN SERPL ELPH-MCNC: 2.6 G/DL — LOW (ref 3.3–5)
ALBUMIN SERPL ELPH-MCNC: 2.7 G/DL — LOW (ref 3.3–5)
ALBUMIN SERPL ELPH-MCNC: 2.7 G/DL — LOW (ref 3.3–5)
ALP SERPL-CCNC: 307 U/L — HIGH (ref 40–120)
ALP SERPL-CCNC: 362 U/L — HIGH (ref 40–120)
ALP SERPL-CCNC: 390 U/L — HIGH (ref 40–120)
ALP SERPL-CCNC: 393 U/L — HIGH (ref 40–120)
ALT FLD-CCNC: 42 U/L RC — SIGNIFICANT CHANGE UP (ref 10–45)
ALT FLD-CCNC: 55 U/L RC — HIGH (ref 10–45)
ALT FLD-CCNC: 64 U/L RC — HIGH (ref 10–45)
ALT FLD-CCNC: 66 U/L RC — HIGH (ref 10–45)
ANION GAP SERPL CALC-SCNC: 10 MMOL/L — SIGNIFICANT CHANGE UP (ref 5–17)
ANION GAP SERPL CALC-SCNC: 13 MMOL/L — SIGNIFICANT CHANGE UP (ref 5–17)
ANION GAP SERPL CALC-SCNC: 14 MMOL/L — SIGNIFICANT CHANGE UP (ref 5–17)
APTT BLD: 24.9 SEC — LOW (ref 27.5–37.4)
APTT BLD: 24.9 SEC — LOW (ref 27.5–37.4)
AST SERPL-CCNC: 107 U/L — HIGH (ref 10–40)
AST SERPL-CCNC: 27 U/L — SIGNIFICANT CHANGE UP (ref 10–40)
AST SERPL-CCNC: 32 U/L — SIGNIFICANT CHANGE UP (ref 10–40)
AST SERPL-CCNC: 76 U/L — HIGH (ref 10–40)
BILIRUB SERPL-MCNC: 0.4 MG/DL — SIGNIFICANT CHANGE UP (ref 0.2–1.2)
BILIRUB SERPL-MCNC: 0.5 MG/DL — SIGNIFICANT CHANGE UP (ref 0.2–1.2)
BILIRUB SERPL-MCNC: 0.5 MG/DL — SIGNIFICANT CHANGE UP (ref 0.2–1.2)
BILIRUB SERPL-MCNC: 0.6 MG/DL — SIGNIFICANT CHANGE UP (ref 0.2–1.2)
BUN SERPL-MCNC: 46 MG/DL — HIGH (ref 7–23)
BUN SERPL-MCNC: 51 MG/DL — HIGH (ref 7–23)
BUN SERPL-MCNC: 53 MG/DL — HIGH (ref 7–23)
BUN SERPL-MCNC: 55 MG/DL — HIGH (ref 7–23)
BUN SERPL-MCNC: 60 MG/DL — HIGH (ref 7–23)
CALCIUM SERPL-MCNC: 8.2 MG/DL — LOW (ref 8.4–10.5)
CALCIUM SERPL-MCNC: 8.5 MG/DL — SIGNIFICANT CHANGE UP (ref 8.4–10.5)
CALCIUM SERPL-MCNC: 8.5 MG/DL — SIGNIFICANT CHANGE UP (ref 8.4–10.5)
CALCIUM SERPL-MCNC: 8.6 MG/DL — SIGNIFICANT CHANGE UP (ref 8.4–10.5)
CALCIUM SERPL-MCNC: 8.9 MG/DL — SIGNIFICANT CHANGE UP (ref 8.4–10.5)
CHLORIDE SERPL-SCNC: 95 MMOL/L — LOW (ref 96–108)
CHLORIDE SERPL-SCNC: 96 MMOL/L — SIGNIFICANT CHANGE UP (ref 96–108)
CHLORIDE SERPL-SCNC: 97 MMOL/L — SIGNIFICANT CHANGE UP (ref 96–108)
CO2 SERPL-SCNC: 24 MMOL/L — SIGNIFICANT CHANGE UP (ref 22–31)
CO2 SERPL-SCNC: 25 MMOL/L — SIGNIFICANT CHANGE UP (ref 22–31)
CO2 SERPL-SCNC: 25 MMOL/L — SIGNIFICANT CHANGE UP (ref 22–31)
CO2 SERPL-SCNC: 26 MMOL/L — SIGNIFICANT CHANGE UP (ref 22–31)
CO2 SERPL-SCNC: 31 MMOL/L — SIGNIFICANT CHANGE UP (ref 22–31)
CREAT SERPL-MCNC: 2.67 MG/DL — HIGH (ref 0.5–1.3)
CREAT SERPL-MCNC: 2.85 MG/DL — HIGH (ref 0.5–1.3)
CREAT SERPL-MCNC: 2.99 MG/DL — HIGH (ref 0.5–1.3)
CREAT SERPL-MCNC: 3.08 MG/DL — HIGH (ref 0.5–1.3)
CREAT SERPL-MCNC: 3.35 MG/DL — HIGH (ref 0.5–1.3)
GAS PNL BLDA: SIGNIFICANT CHANGE UP
GAS PNL BLDA: SIGNIFICANT CHANGE UP
GLUCOSE BLDC GLUCOMTR-MCNC: 135 MG/DL — HIGH (ref 70–99)
GLUCOSE BLDC GLUCOMTR-MCNC: 140 MG/DL — HIGH (ref 70–99)
GLUCOSE BLDC GLUCOMTR-MCNC: 167 MG/DL — HIGH (ref 70–99)
GLUCOSE BLDC GLUCOMTR-MCNC: 251 MG/DL — HIGH (ref 70–99)
GLUCOSE SERPL-MCNC: 133 MG/DL — HIGH (ref 70–99)
GLUCOSE SERPL-MCNC: 147 MG/DL — HIGH (ref 70–99)
GLUCOSE SERPL-MCNC: 152 MG/DL — HIGH (ref 70–99)
GLUCOSE SERPL-MCNC: 206 MG/DL — HIGH (ref 70–99)
GLUCOSE SERPL-MCNC: 256 MG/DL — HIGH (ref 70–99)
HCT VFR BLD CALC: 24.6 % — LOW (ref 34.5–45)
HCT VFR BLD CALC: 27.3 % — LOW (ref 34.5–45)
HCT VFR BLD CALC: 27.9 % — LOW (ref 34.5–45)
HGB BLD-MCNC: 8.2 G/DL — LOW (ref 11.5–15.5)
HGB BLD-MCNC: 8.6 G/DL — LOW (ref 11.5–15.5)
HGB BLD-MCNC: 9.2 G/DL — LOW (ref 11.5–15.5)
INR BLD: 0.91 RATIO — SIGNIFICANT CHANGE UP (ref 0.88–1.16)
INR BLD: 1 RATIO — SIGNIFICANT CHANGE UP (ref 0.88–1.16)
MAGNESIUM SERPL-MCNC: 2.4 MG/DL — SIGNIFICANT CHANGE UP (ref 1.6–2.6)
MAGNESIUM SERPL-MCNC: 2.6 MG/DL — SIGNIFICANT CHANGE UP (ref 1.6–2.6)
MAGNESIUM SERPL-MCNC: 2.9 MG/DL — HIGH (ref 1.6–2.6)
MAGNESIUM SERPL-MCNC: 2.9 MG/DL — HIGH (ref 1.6–2.6)
MCHC RBC-ENTMCNC: 29.4 PG — SIGNIFICANT CHANGE UP (ref 27–34)
MCHC RBC-ENTMCNC: 31.4 PG — SIGNIFICANT CHANGE UP (ref 27–34)
MCHC RBC-ENTMCNC: 31.5 GM/DL — LOW (ref 32–36)
MCHC RBC-ENTMCNC: 32 PG — SIGNIFICANT CHANGE UP (ref 27–34)
MCHC RBC-ENTMCNC: 32.8 GM/DL — SIGNIFICANT CHANGE UP (ref 32–36)
MCHC RBC-ENTMCNC: 33.6 GM/DL — SIGNIFICANT CHANGE UP (ref 32–36)
MCV RBC AUTO: 93.2 FL — SIGNIFICANT CHANGE UP (ref 80–100)
MCV RBC AUTO: 95.4 FL — SIGNIFICANT CHANGE UP (ref 80–100)
MCV RBC AUTO: 95.8 FL — SIGNIFICANT CHANGE UP (ref 80–100)
PHOSPHATE SERPL-MCNC: 4.6 MG/DL — HIGH (ref 2.5–4.5)
PHOSPHATE SERPL-MCNC: 5.4 MG/DL — HIGH (ref 2.5–4.5)
PHOSPHATE SERPL-MCNC: 5.8 MG/DL — HIGH (ref 2.5–4.5)
PHOSPHATE SERPL-MCNC: 6.6 MG/DL — HIGH (ref 2.5–4.5)
PLATELET # BLD AUTO: 203 K/UL — SIGNIFICANT CHANGE UP (ref 150–400)
PLATELET # BLD AUTO: 236 K/UL — SIGNIFICANT CHANGE UP (ref 150–400)
PLATELET # BLD AUTO: 280 K/UL — SIGNIFICANT CHANGE UP (ref 150–400)
POTASSIUM SERPL-MCNC: 5.1 MMOL/L — SIGNIFICANT CHANGE UP (ref 3.5–5.3)
POTASSIUM SERPL-MCNC: 5.5 MMOL/L — HIGH (ref 3.5–5.3)
POTASSIUM SERPL-MCNC: 5.8 MMOL/L — HIGH (ref 3.5–5.3)
POTASSIUM SERPL-MCNC: 6.6 MMOL/L — CRITICAL HIGH (ref 3.5–5.3)
POTASSIUM SERPL-MCNC: 9 MMOL/L — CRITICAL HIGH (ref 3.5–5.3)
POTASSIUM SERPL-SCNC: 5.1 MMOL/L — SIGNIFICANT CHANGE UP (ref 3.5–5.3)
POTASSIUM SERPL-SCNC: 5.5 MMOL/L — HIGH (ref 3.5–5.3)
POTASSIUM SERPL-SCNC: 5.8 MMOL/L — HIGH (ref 3.5–5.3)
POTASSIUM SERPL-SCNC: 6.6 MMOL/L — CRITICAL HIGH (ref 3.5–5.3)
POTASSIUM SERPL-SCNC: 9 MMOL/L — CRITICAL HIGH (ref 3.5–5.3)
PROT SERPL-MCNC: 6.2 G/DL — SIGNIFICANT CHANGE UP (ref 6–8.3)
PROT SERPL-MCNC: 6.9 G/DL — SIGNIFICANT CHANGE UP (ref 6–8.3)
PROT SERPL-MCNC: 7.1 G/DL — SIGNIFICANT CHANGE UP (ref 6–8.3)
PROT SERPL-MCNC: 7.2 G/DL — SIGNIFICANT CHANGE UP (ref 6–8.3)
PROTHROM AB SERPL-ACNC: 10.8 SEC — SIGNIFICANT CHANGE UP (ref 9.8–12.7)
PROTHROM AB SERPL-ACNC: 9.8 SEC — SIGNIFICANT CHANGE UP (ref 9.8–12.7)
RBC # BLD: 2.58 M/UL — LOW (ref 3.8–5.2)
RBC # BLD: 2.92 M/UL — LOW (ref 3.8–5.2)
RBC # BLD: 2.93 M/UL — LOW (ref 3.8–5.2)
RBC # FLD: 15.5 % — HIGH (ref 10.3–14.5)
RBC # FLD: 15.9 % — HIGH (ref 10.3–14.5)
RBC # FLD: 18.1 % — HIGH (ref 10.3–14.5)
SODIUM SERPL-SCNC: 132 MMOL/L — LOW (ref 135–145)
SODIUM SERPL-SCNC: 134 MMOL/L — LOW (ref 135–145)
SODIUM SERPL-SCNC: 134 MMOL/L — LOW (ref 135–145)
SODIUM SERPL-SCNC: 136 MMOL/L — SIGNIFICANT CHANGE UP (ref 135–145)
SODIUM SERPL-SCNC: 136 MMOL/L — SIGNIFICANT CHANGE UP (ref 135–145)
WBC # BLD: 11.5 K/UL — HIGH (ref 3.8–10.5)
WBC # BLD: 15.7 K/UL — HIGH (ref 3.8–10.5)
WBC # BLD: 8.98 K/UL — SIGNIFICANT CHANGE UP (ref 3.8–10.5)
WBC # FLD AUTO: 11.5 K/UL — HIGH (ref 3.8–10.5)
WBC # FLD AUTO: 15.7 K/UL — HIGH (ref 3.8–10.5)
WBC # FLD AUTO: 8.98 K/UL — SIGNIFICANT CHANGE UP (ref 3.8–10.5)

## 2018-02-21 PROCEDURE — 99291 CRITICAL CARE FIRST HOUR: CPT | Mod: 25

## 2018-02-21 PROCEDURE — 76604 US EXAM CHEST: CPT | Mod: 26

## 2018-02-21 PROCEDURE — 71045 X-RAY EXAM CHEST 1 VIEW: CPT | Mod: 26,76

## 2018-02-21 PROCEDURE — 99232 SBSQ HOSP IP/OBS MODERATE 35: CPT

## 2018-02-21 PROCEDURE — 93308 TTE F-UP OR LMTD: CPT | Mod: 26

## 2018-02-21 PROCEDURE — 93010 ELECTROCARDIOGRAM REPORT: CPT

## 2018-02-21 PROCEDURE — 93971 EXTREMITY STUDY: CPT | Mod: 26

## 2018-02-21 PROCEDURE — 99233 SBSQ HOSP IP/OBS HIGH 50: CPT | Mod: GC

## 2018-02-21 PROCEDURE — 71045 X-RAY EXAM CHEST 1 VIEW: CPT | Mod: 26,77

## 2018-02-21 RX ORDER — DEXTROSE 50 % IN WATER 50 %
1 SYRINGE (ML) INTRAVENOUS ONCE
Qty: 0 | Refills: 0 | Status: DISCONTINUED | OUTPATIENT
Start: 2018-02-21 | End: 2018-02-22

## 2018-02-21 RX ORDER — PROPOFOL 10 MG/ML
1 INJECTION, EMULSION INTRAVENOUS
Qty: 1000 | Refills: 0 | Status: DISCONTINUED | OUTPATIENT
Start: 2018-02-21 | End: 2018-02-23

## 2018-02-21 RX ORDER — HEPARIN SODIUM 5000 [USP'U]/ML
5000 INJECTION INTRAVENOUS; SUBCUTANEOUS EVERY 8 HOURS
Qty: 0 | Refills: 0 | Status: DISCONTINUED | OUTPATIENT
Start: 2018-02-21 | End: 2018-03-14

## 2018-02-21 RX ORDER — VANCOMYCIN HCL 1 G
1000 VIAL (EA) INTRAVENOUS ONCE
Qty: 0 | Refills: 0 | Status: COMPLETED | OUTPATIENT
Start: 2018-02-21 | End: 2018-02-21

## 2018-02-21 RX ORDER — SODIUM CHLORIDE 9 MG/ML
1000 INJECTION, SOLUTION INTRAVENOUS
Qty: 0 | Refills: 0 | Status: DISCONTINUED | OUTPATIENT
Start: 2018-02-21 | End: 2018-03-30

## 2018-02-21 RX ORDER — HUMAN INSULIN 100 [IU]/ML
10 INJECTION, SUSPENSION SUBCUTANEOUS EVERY 8 HOURS
Qty: 0 | Refills: 0 | Status: DISCONTINUED | OUTPATIENT
Start: 2018-02-21 | End: 2018-02-21

## 2018-02-21 RX ORDER — DEXTROSE 50 % IN WATER 50 %
12.5 SYRINGE (ML) INTRAVENOUS ONCE
Qty: 0 | Refills: 0 | Status: DISCONTINUED | OUTPATIENT
Start: 2018-02-21 | End: 2018-02-22

## 2018-02-21 RX ORDER — CHLORHEXIDINE GLUCONATE 213 G/1000ML
15 SOLUTION TOPICAL EVERY 4 HOURS
Qty: 0 | Refills: 0 | Status: DISCONTINUED | OUTPATIENT
Start: 2018-02-21 | End: 2018-03-30

## 2018-02-21 RX ORDER — SODIUM CHLORIDE 9 MG/ML
1000 INJECTION, SOLUTION INTRAVENOUS
Qty: 0 | Refills: 0 | Status: DISCONTINUED | OUTPATIENT
Start: 2018-02-21 | End: 2018-02-21

## 2018-02-21 RX ORDER — NOREPINEPHRINE BITARTRATE/D5W 8 MG/250ML
0.07 PLASTIC BAG, INJECTION (ML) INTRAVENOUS
Qty: 16 | Refills: 0 | Status: DISCONTINUED | OUTPATIENT
Start: 2018-02-21 | End: 2018-02-23

## 2018-02-21 RX ORDER — INSULIN GLARGINE 100 [IU]/ML
7.5 INJECTION, SOLUTION SUBCUTANEOUS AT BEDTIME
Qty: 0 | Refills: 0 | Status: DISCONTINUED | OUTPATIENT
Start: 2018-02-21 | End: 2018-02-22

## 2018-02-21 RX ORDER — ALBUTEROL 90 UG/1
7.5 AEROSOL, METERED ORAL ONCE
Qty: 0 | Refills: 0 | Status: COMPLETED | OUTPATIENT
Start: 2018-02-21 | End: 2018-02-22

## 2018-02-21 RX ORDER — SODIUM POLYSTYRENE SULFONATE 4.1 MEQ/G
30 POWDER, FOR SUSPENSION ORAL ONCE
Qty: 0 | Refills: 0 | Status: COMPLETED | OUTPATIENT
Start: 2018-02-21 | End: 2018-02-21

## 2018-02-21 RX ORDER — NYSTATIN CREAM 100000 [USP'U]/G
1 CREAM TOPICAL
Qty: 0 | Refills: 0 | Status: DISCONTINUED | OUTPATIENT
Start: 2018-02-21 | End: 2018-03-20

## 2018-02-21 RX ORDER — DEXTROSE 50 % IN WATER 50 %
25 SYRINGE (ML) INTRAVENOUS ONCE
Qty: 0 | Refills: 0 | Status: DISCONTINUED | OUTPATIENT
Start: 2018-02-21 | End: 2018-02-22

## 2018-02-21 RX ORDER — DEXTROSE 50 % IN WATER 50 %
25 SYRINGE (ML) INTRAVENOUS ONCE
Qty: 0 | Refills: 0 | Status: DISCONTINUED | OUTPATIENT
Start: 2018-02-21 | End: 2018-02-21

## 2018-02-21 RX ORDER — GLUCAGON INJECTION, SOLUTION 0.5 MG/.1ML
1 INJECTION, SOLUTION SUBCUTANEOUS ONCE
Qty: 0 | Refills: 0 | Status: DISCONTINUED | OUTPATIENT
Start: 2018-02-21 | End: 2018-02-22

## 2018-02-21 RX ORDER — INSULIN LISPRO 100/ML
VIAL (ML) SUBCUTANEOUS
Qty: 0 | Refills: 0 | Status: DISCONTINUED | OUTPATIENT
Start: 2018-02-21 | End: 2018-02-21

## 2018-02-21 RX ORDER — GLUCAGON INJECTION, SOLUTION 0.5 MG/.1ML
1 INJECTION, SOLUTION SUBCUTANEOUS ONCE
Qty: 0 | Refills: 0 | Status: DISCONTINUED | OUTPATIENT
Start: 2018-02-21 | End: 2018-02-21

## 2018-02-21 RX ORDER — INSULIN GLARGINE 100 [IU]/ML
15 INJECTION, SOLUTION SUBCUTANEOUS AT BEDTIME
Qty: 0 | Refills: 0 | Status: DISCONTINUED | OUTPATIENT
Start: 2018-02-21 | End: 2018-02-21

## 2018-02-21 RX ORDER — ACETAMINOPHEN 500 MG
650 TABLET ORAL ONCE
Qty: 0 | Refills: 0 | Status: COMPLETED | OUTPATIENT
Start: 2018-02-21 | End: 2018-02-21

## 2018-02-21 RX ORDER — PANTOPRAZOLE SODIUM 20 MG/1
40 TABLET, DELAYED RELEASE ORAL DAILY
Qty: 0 | Refills: 0 | Status: DISCONTINUED | OUTPATIENT
Start: 2018-02-21 | End: 2018-03-01

## 2018-02-21 RX ORDER — INSULIN LISPRO 100/ML
VIAL (ML) SUBCUTANEOUS AT BEDTIME
Qty: 0 | Refills: 0 | Status: DISCONTINUED | OUTPATIENT
Start: 2018-02-21 | End: 2018-02-21

## 2018-02-21 RX ORDER — DEXTROSE 50 % IN WATER 50 %
1 SYRINGE (ML) INTRAVENOUS ONCE
Qty: 0 | Refills: 0 | Status: DISCONTINUED | OUTPATIENT
Start: 2018-02-21 | End: 2018-02-21

## 2018-02-21 RX ORDER — CARVEDILOL PHOSPHATE 80 MG/1
12.5 CAPSULE, EXTENDED RELEASE ORAL EVERY 12 HOURS
Qty: 0 | Refills: 0 | Status: DISCONTINUED | OUTPATIENT
Start: 2018-02-21 | End: 2018-02-21

## 2018-02-21 RX ORDER — INSULIN LISPRO 100/ML
VIAL (ML) SUBCUTANEOUS EVERY 6 HOURS
Qty: 0 | Refills: 0 | Status: DISCONTINUED | OUTPATIENT
Start: 2018-02-21 | End: 2018-02-22

## 2018-02-21 RX ORDER — DEXTROSE 50 % IN WATER 50 %
12.5 SYRINGE (ML) INTRAVENOUS ONCE
Qty: 0 | Refills: 0 | Status: DISCONTINUED | OUTPATIENT
Start: 2018-02-21 | End: 2018-02-21

## 2018-02-21 RX ORDER — IPRATROPIUM/ALBUTEROL SULFATE 18-103MCG
3 AEROSOL WITH ADAPTER (GRAM) INHALATION ONCE
Qty: 0 | Refills: 0 | Status: COMPLETED | OUTPATIENT
Start: 2018-02-21 | End: 2018-02-21

## 2018-02-21 RX ORDER — PIPERACILLIN AND TAZOBACTAM 4; .5 G/20ML; G/20ML
3.38 INJECTION, POWDER, LYOPHILIZED, FOR SOLUTION INTRAVENOUS EVERY 12 HOURS
Qty: 0 | Refills: 0 | Status: DISCONTINUED | OUTPATIENT
Start: 2018-02-21 | End: 2018-02-24

## 2018-02-21 RX ORDER — PIPERACILLIN AND TAZOBACTAM 4; .5 G/20ML; G/20ML
3.38 INJECTION, POWDER, LYOPHILIZED, FOR SOLUTION INTRAVENOUS EVERY 12 HOURS
Qty: 0 | Refills: 0 | Status: DISCONTINUED | OUTPATIENT
Start: 2018-02-21 | End: 2018-02-21

## 2018-02-21 RX ORDER — ASPIRIN/CALCIUM CARB/MAGNESIUM 324 MG
81 TABLET ORAL DAILY
Qty: 0 | Refills: 0 | Status: DISCONTINUED | OUTPATIENT
Start: 2018-02-21 | End: 2018-03-30

## 2018-02-21 RX ADMIN — Medication 250 MILLIGRAM(S): at 15:32

## 2018-02-21 RX ADMIN — PANTOPRAZOLE SODIUM 40 MILLIGRAM(S): 20 TABLET, DELAYED RELEASE ORAL at 17:04

## 2018-02-21 RX ADMIN — Medication 25 MILLIGRAM(S): at 06:05

## 2018-02-21 RX ADMIN — Medication 650 MILLIGRAM(S): at 21:35

## 2018-02-21 RX ADMIN — Medication 4.91 MICROGRAM(S)/KG/MIN: at 21:38

## 2018-02-21 RX ADMIN — Medication 3 MILLILITER(S): at 01:15

## 2018-02-21 RX ADMIN — PIPERACILLIN AND TAZOBACTAM 25 GRAM(S): 4; .5 INJECTION, POWDER, LYOPHILIZED, FOR SOLUTION INTRAVENOUS at 16:42

## 2018-02-21 RX ADMIN — Medication 6: at 06:04

## 2018-02-21 RX ADMIN — PROPOFOL 0.45 MICROGRAM(S)/KG/MIN: 10 INJECTION, EMULSION INTRAVENOUS at 13:00

## 2018-02-21 RX ADMIN — Medication 2: at 23:30

## 2018-02-21 RX ADMIN — Medication 3 MILLILITER(S): at 06:04

## 2018-02-21 RX ADMIN — Medication 4.91 MICROGRAM(S)/KG/MIN: at 17:05

## 2018-02-21 RX ADMIN — PROPOFOL 0.45 MICROGRAM(S)/KG/MIN: 10 INJECTION, EMULSION INTRAVENOUS at 21:38

## 2018-02-21 RX ADMIN — Medication 3 MILLILITER(S): at 13:03

## 2018-02-21 RX ADMIN — INSULIN GLARGINE 7.5 UNIT(S): 100 INJECTION, SOLUTION SUBCUTANEOUS at 21:46

## 2018-02-21 RX ADMIN — NYSTATIN CREAM 1 APPLICATION(S): 100000 CREAM TOPICAL at 06:06

## 2018-02-21 RX ADMIN — Medication 2: at 17:13

## 2018-02-21 RX ADMIN — HEPARIN SODIUM 5000 UNIT(S): 5000 INJECTION INTRAVENOUS; SUBCUTANEOUS at 06:05

## 2018-02-21 RX ADMIN — HEPARIN SODIUM 5000 UNIT(S): 5000 INJECTION INTRAVENOUS; SUBCUTANEOUS at 15:26

## 2018-02-21 RX ADMIN — CARVEDILOL PHOSPHATE 12.5 MILLIGRAM(S): 80 CAPSULE, EXTENDED RELEASE ORAL at 06:05

## 2018-02-21 RX ADMIN — HEPARIN SODIUM 5000 UNIT(S): 5000 INJECTION INTRAVENOUS; SUBCUTANEOUS at 21:37

## 2018-02-21 RX ADMIN — Medication 1 APPLICATION(S): at 06:07

## 2018-02-21 RX ADMIN — NYSTATIN CREAM 1 APPLICATION(S): 100000 CREAM TOPICAL at 17:05

## 2018-02-21 RX ADMIN — Medication 1 APPLICATION(S): at 17:05

## 2018-02-21 NOTE — CHART NOTE - NSCHARTNOTEFT_GEN_A_CORE
RRT called on Pt for increase tachypnea and hypoxia on bipap    Full detailed in RRT   Disposition PT remains in room   Dr Palmer made aware of patients status   Nephrology contact for additional bedside HD  Family spoken to by MAITE Gay cont to monitor   Medicine EMERITA Avilez [id number] RRT called on Pt for increase tachypnea and hypoxia on bipap    Full detailed in RRT   Disposition PT remains in room   Dr Palmer made aware of patients status   Nephrology contact for additional bedside HD  Family spoken to by MAR   MICU consult called post RRT   Will cont to monitor   Medicine NP ALANIS Avilez [id number] RRT called 1110 on Pt for increase tachypnea and hypoxia on bipap    Full detailed in RRT   Disposition PT remains in room   Dr Palmer made aware of patients status   Nephrology contact for additional bedside HD  Family spoken to by MAR   MICU consult called post RRT   Will cont to monitor   1225 Addendum PT intubated at bedside transferred to MICU   Medicine NP ALANIS Avilez [phone]

## 2018-02-21 NOTE — CHART NOTE - NSCHARTNOTEFT_GEN_A_CORE
RRT: for hypoxia and tachypnea.     68y/o F hx Breast Cancer (diagnosed in Feb 2017) currently on Herceptin (last dose Jan 20th), HTN, Diabetes on 70/30 (total daily dose 90 units), presenting with Fever found to have influenza subsequently went into PEA arrest x 2 with ROSC, complicated by bilateral pneumothoraces s/p 3 chest tubes and paracentesis decompression of the abdomen, based on diagnostic impression of PE s/p tPA 1/30, also s/p new CVA, and also new ESRD requiring almost daily HD.     Per primary team, patient became tachypneic and hypoxic overnight, was switched from nasal cannula to BIPAP overnight. Tube feeds discontinued overnight. Cxray done at that time was unchanged.     RRT initially called this AM for worsening tachypnea. Pt was evaluated at bedside breathing around 30s with belly breathing, hypoxic to 85% on 40% FIO2, increased to 100% with improvement in oxygen saturation to 97%. Cxray read as worsening pulm edema. ABG showed hypercapnia. Renal called for urgent HD. Also concern for asp pna as increasing density of RLL.     2nd RRT. Pt with worsening resp status, worsening tachypnea to 40s. Anesthesia called stat for intubation.  GOC attempted with  who wanted full code.     After intubation, 3rd RRT called-not able to get BP.   BP on leg to be 90s/60s. Pt transported to MICU.     MAITE Sainz  [id number] RRT: for hypoxia and tachypnea.     66y/o F hx Breast Cancer (diagnosed in Feb 2017) currently on Herceptin (last dose Jan 20th), HTN, Diabetes on 70/30 (total daily dose 90 units), presenting with Fever found to have influenza subsequently went into PEA arrest x 2 with ROSC, complicated by bilateral pneumothoraces s/p 3 chest tubes and paracentesis decompression of the abdomen, based on diagnostic impression of PE s/p tPA 1/30, also s/p new CVA, and also new ESRD requiring almost daily HD.     Per primary team, patient became tachypneic and hypoxic overnight, was switched from nasal cannula to BIPAP overnight. Tube feeds discontinued overnight. Cxray done at that time was unchanged.     RRT initially called this AM for worsening tachypnea. Pt was evaluated at bedside breathing around 30s with belly breathing, hypoxic to 85% on 40% FIO2, increased to 100% with improvement in oxygen saturation to 97%. Cxray read as worsening pulm edema. ABG showed hypercapnia. Renal called for urgent HD. Also concern for asp pna as increasing density of RLL.     2nd RRT. Pt with worsening resp status, worsening tachypnea to 40s. Anesthesia called stat for intubation.  GOC attempted with  who wanted full code.     After intubation, 3rd RRT called-not able to get BP.   BP on leg to be 90s/60s. Pt transported to MICU.     Plan discussed with  at bedside.     MAITE Sainz  [id number]

## 2018-02-21 NOTE — CONSULT NOTE ADULT - ASSESSMENT
67F PMH HTN, DMT2 (70/30 TDD: 90 on admission), Breast Cancer (diagnosed in Feb 2017) currently on Herceptin (last dose Jan 20th), originally presented 1/30 with Fever found to have influenza subsequently went into PEA arrest x 2 with ROSC, complicated by bilateral pneumothoraces with pneumomediastinum s/p 3 chest tubes and paracentesis decompression of the abdomen, then found to have PE s/p tPA 1/30, also s/p new CVA, and also new ESRD requiring almost daily HD found to be in hypoxic respiratory failure likely 2/2 aspiration event currently being covered with abx for PNA which likely occured in the context of deranged laryngeal protection ability in the context of BiPaP utilization. 67F PMH HTN, DMT2 (70/30 TDD: 90 on admission), Breast Cancer (diagnosed in Feb 2017) currently on Herceptin (last dose Jan 20th), originally presented 1/30 with Fever found to have influenza subsequently went into PEA arrest x 2 with ROSC, complicated by bilateral pneumothoraces with pneumomediastinum s/p 3 chest tubes and paracentesis decompression of the abdomen, then found to have PE s/p tPA 1/30, also s/p new CVA, and also new ESRD requiring almost daily HD found to be in hypoxic respiratory failure likely 2/2 aspiration event currently being covered with abx for PNA which likely occured in the context of deranged laryngeal protection ability in the context of BiPaP utilization.     # Neuro:      # CV:      # Pulm:      # Renal:      # ID:      # GI:  - GI p/px with protonix  - Holding     # Endocrine:  - Patient currently NPO, will cover with 12U Lantus and FS q6h.  - appreciate endocrine recs    # Heme:  - Patient with downtrending H/H s/p 2U PRBC currently suspected to be 2/2 blood loss anemia vs AOC.  - Will continue to trend H/H, repeat FOBT and consider RP bleed if w/u returns negative.  - Currently holding Herceptin    # Skin:  - No sacral decubiti or signs of infection  - Patient with prolonged Triley in RIJ. Will plan for permacath so as to expedite CVC removal    # DVT p/px:  - [facility]            Roland Lazar MD  PGY-3 | Internal Medicine  Pager: [phone] 67F PMH HTN, DMT2 (70/30 TDD: 90 on admission), Breast Cancer (diagnosed in Feb 2017) currently on Herceptin (last dose Jan 20th), originally presented 1/30 with Fever found to have influenza subsequently went into PEA arrest x 2 with ROSC, complicated by bilateral pneumothoraces with pneumomediastinum s/p 3 chest tubes and paracentesis decompression of the abdomen, then found to have PE s/p tPA 1/30, also s/p new CVA, and also new ESRD requiring almost daily HD found to be in hypoxic respiratory failure likely 2/2 aspiration event currently being covered with abx for PNA which likely occured in the context of deranged laryngeal protection ability in the context of BiPaP utilization.     # Neuro:  - Currently sedated on propofol. Known watershed ischemia of the occiput from low flow state during admission 2/2 flu  - Low suspicion for repeat CVA event, no need for CT or MR of the head as of now    # CV:  - Patient currently requiring Levo to maintain MAP > 65, suspect mix of venodilation from propofol with beginnings of vasoplegia 2/2 aspiration PNA and chronic illness  - No EF derangements noted on POCUS    # Pulm:  - Patient currently intubated for hypoxemic respiratory failure which is likely 2/2 aspiration PNA  - Titrate O2 down as tolerated to prevent hyperoxia  - No dialyzable fluid visualized on US  - PNA treatment as below    # Renal:  - Patient with Triley in place for HD.  - Patient does not require HD today, will dialyze per Renal  - appreciate renal recommendations      # ID:  - Vanc by level and renally dosed Zosyn for empiric Aspiration PNA in the context of immunocompromised state and prolonged hospitalization with poor functional status  - Plan for 5-7 days, currently day 1    # GI:  - GI p/px with protonix  - Holding     # Endocrine:  - Patient currently NPO, will cover with 12U Lantus and FS q6h.  - appreciate endocrine recs    # Heme:  - Patient with downtrending H/H s/p 2U PRBC currently suspected to be 2/2 blood loss anemia vs AOC.  - Will continue to trend H/H, repeat FOBT and consider RP bleed if w/u returns negative.  - Currently holding Herceptin    # Skin:  - No sacral decubiti or signs of infection  - Patient with prolonged Triley in RIJ. Will plan for permacath so as to expedite CVC removal    # DVT p/px:  - [facility]            Roland Lazar MD  PGY-3 | Internal Medicine  Pager: [phone]

## 2018-02-21 NOTE — CHART NOTE - NSCHARTNOTEFT_GEN_A_CORE
: Rakesh Kim MS4, Dr. Steiner    INDICATION: POCUS    PROCEDURE:  [X] LIMITED ECHO  [X] LIMITED CHEST  [ ] LIMITED RETROPERITONEAL  [ ] LIMITED ABDOMINAL  [ ] LIMITED DVT  [ ] NEEDLE GUIDANCE VASCULAR  [ ] NEEDLE GUIDANCE THORACENTESIS  [ ] NEEDLE GUIDANCE PARACENTESIS  [ ] NEEDLE GUIDANCE PERICARDIOCENTESIS  [ ] OTHER    FINDINGS:    Chest: Diffuse B-lines in the anterior and lateral right lung fields, lung sliding present. Absence of lung sliding in the left lung anteriorly.     Echo: LV function grossly normal, no pericardial effusion appreciated, no RVOT dilation, no segmental wall abnormalities, no septal flattening in PSSA. Subcostal and A4C views limited.     INTERPRETATION:    Chest: Diffuse B-lines in the right lung concerning for an alveolar vs interstitial process. Absence of lung sliding in the left lung suggestive of pneumothorax. : Rakesh Kim MS4, Dr. Steiner    INDICATION: POCUS    PROCEDURE:  [X] LIMITED ECHO  [X] LIMITED CHEST  [ ] LIMITED RETROPERITONEAL  [ ] LIMITED ABDOMINAL  [ ] LIMITED DVT  [ ] NEEDLE GUIDANCE VASCULAR  [ ] NEEDLE GUIDANCE THORACENTESIS  [ ] NEEDLE GUIDANCE PARACENTESIS  [ ] NEEDLE GUIDANCE PERICARDIOCENTESIS  [ ] OTHER    FINDINGS:    Chest: Diffuse B-lines in the anterior and lateral right lung fields, lung sliding present. Absence of lung sliding in the left lung anteriorly.     Echo: LV function grossly normal, no pericardial effusion appreciated, no RVOT dilation, no segmental wall abnormalities, no septal flattening in PSSA. Subcostal and A4C views limited.     INTERPRETATION:    Chest: Diffuse B-lines in the right lung concerning for an alveolar vs interstitial process. Absence of lung sliding in the left lung suggestive of pneumothorax.    With Jatin TITUS

## 2018-02-21 NOTE — PROGRESS NOTE ADULT - ASSESSMENT
68 y/o F with T2DM uncontrolled on insulin, osteoporosis, HTN, here with acute respiratory failure 2/2 influenza s/p PEA arrest x 2, re-intubated for airway protection in the setting of mental status changes and hypotension.

## 2018-02-21 NOTE — PROGRESS NOTE ADULT - PROBLEM SELECTOR PLAN 1
likely from ATN in setting of cardiac arrest. Pt continues to be oligo anuric. Continues to be dialysis dependant. Monitor for renal recovery.   Please remove non tunneled catheter/replace with perm HD catheter.  No indication for HD today. Will re evaluate tomorrow.

## 2018-02-21 NOTE — PROGRESS NOTE ADULT - PROBLEM SELECTOR PLAN 1
-Check bs q6  -mod scale q6  -avoid NPH while NPO. She got her last dose about 12am and has CKD 4 so would give Lantus 15 units sq tonight.  -adjust insulin as diet is advanced.  Patria Weiner MD  [phone]

## 2018-02-21 NOTE — CONSULT NOTE ADULT - SUBJECTIVE AND OBJECTIVE BOX
CHIEF COMPLAINT:    HPI:  67F PMH HTN, DMT2 (70/30 TDD: 90 on admission), Breast Cancer (diagnosed in Feb 2017) currently on Herceptin (last dose Jan 20th), originally presented 1/30 with Fever found to have influenza subsequently went into PEA arrest x 2 with ROSC, complicated by bilateral pneumothoraces s/p 3 chest tubes and paracentesis decompression of the abdomen, then found to have PE s/p tPA 1/30, also s/p new CVA, and also new ESRD requiring almost daily HD.     Patient was admitted to MICU for further management. Patient was found to have acute renal failure and shock liver. Nephrology was consulted and patient was started on dialysis. Patient was started on Tamiflu for flu and zosyn for possible aspiration pneumonia. Patients chest tubes and peritoneal drain were removed. Patient redeveloped a pneumothorax on the L side and chest tube was reinserted. 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 Urine legionella was negative and azithromycin was discontinued. Patient continued to be anuric and continued to receive dialysis. Shock liver resolved. Patient was extubated on 2/8/17. Patient completed a 5 day course of tamiflu and 7 day course of zosyn and did not show signs of infection. Patients pneumothorax on L side resolved and chest tube was removed. Patients mental status improved and was alert and orientedx1-2 and spontaneously moves all extremities. Patient had an NG tube placed pending an official speech and swallow eval. Patient failed speech and swallow an a repeat speech and swallow was planned. Patient was then transferred to the floors for further management.     Per primary team, patient became tachypneic and hypoxic overnight at 1am, was switched from nasal cannula to BIPAP overnight. Tube feeds discontinued overnight. Cxray done at that time was unchanged. RRT initially called this AM for worsening tachypnea. Pt was evaluated at bedside breathing around 30s with belly breathing, hypoxic to 85% on 40% FIO2, increased to 100% with improvement in oxygen saturation to 97%. Cxray read as worsening pulm edema. ABG showed hypercapnia. Renal called for urgent HD. Also concern for asp pna as increasing density of RLL. 2nd RRT. Pt with worsening resp status, worsening tachypnea to 40s. Anesthesia called stat for intubation. GOC clarified with  who wanted full code.  After intubation, 3rd RRT called-not able to get BP.     PAST MEDICAL & SURGICAL HISTORY:  Diabetes  Breast CA  H/O mastectomy, bilateral      FAMILY HISTORY:  No pertinent family history in first degree relatives      SOCIAL HISTORY:  Recent Travel: No  Country of Birth: [place]  Advance Directives: Full Code    Allergies    doxycycline (Rash)  isoniazid (Rash)  NIFEdipine (Urticaria; Hives)  vitamin E (Short breath; Urticaria; Hives)    Intolerances        HOME MEDICATIONS:    REVIEW OF SYSTEMS:  [x] Unable to assess ROS because patient is intubated and sedated    OBJECTIVE:  ICU Vital Signs Last 24 Hrs  T(C): 36.5 (21 Feb 2018 12:45), Max: 36.9 (21 Feb 2018 06:00)  T(F): 97.7 (21 Feb 2018 12:45), Max: 98.5 (21 Feb 2018 06:00)  HR: 88 (21 Feb 2018 13:01) (78 - 98)  BP: 109/54 (21 Feb 2018 13:00) (109/54 - 174/74)  BP(mean): 78 (21 Feb 2018 13:00) (78 - 107)  ABP: --  ABP(mean): --  RR: 31 (21 Feb 2018 13:00) (18 - 31)  SpO2: 94% (21 Feb 2018 13:01) (90% - 100%)    Mode: AC/ CMV (Assist Control/ Continuous Mandatory Ventilation), RR (machine): 14, TV (machine): 450, FiO2: 40, PEEP: 5, ITime: 1, MAP: 13, PIP: 38    02-20 @ 07:01  -  02-21 @ 07:00  --------------------------------------------------------  IN: 0 mL / OUT: 1000 mL / NET: -1000 mL      CAPILLARY BLOOD GLUCOSE      POCT Blood Glucose.: 140 mg/dL (21 Feb 2018 11:57)      PHYSICAL EXAM:  General:   HEENT:   Lymph Nodes:  Neck:   Respiratory:   Cardiovascular:   Abdomen:   Extremities:   Skin:   Neurological:  Psychiatry:    HOSPITAL MEDICATIONS:  MEDICATIONS  (STANDING):  aspirin  chewable 81 milliGRAM(s) Oral daily  carvedilol 12.5 milliGRAM(s) Oral every 12 hours  dextrose 5%. 1000 milliLiter(s) (50 mL/Hr) IV Continuous <Continuous>  dextrose 50% Injectable 12.5 Gram(s) IV Push once  dextrose 50% Injectable 25 Gram(s) IV Push once  dextrose 50% Injectable 25 Gram(s) IV Push once  heparin  Injectable 5000 Unit(s) SubCutaneous every 8 hours  hydrALAZINE 25 milliGRAM(s) Oral every 8 hours  insulin glargine Injectable (LANTUS) 15 Unit(s) SubCutaneous at bedtime  insulin lispro (HumaLOG) corrective regimen sliding scale   SubCutaneous every 6 hours  norepinephrine Infusion 0.07 MICROgram(s)/kG/Min (4.909 mL/Hr) IV Continuous <Continuous>  nystatin Powder 1 Application(s) Topical two times a day  propofol Infusion 1 MICROgram(s)/kG/Min (0.449 mL/Hr) IV Continuous <Continuous>  triamcinolone 0.1% Ointment 1 Application(s) Topical two times a day    MEDICATIONS  (PRN):  chlorhexidine 0.12% Liquid 15 milliLiter(s) Swish and Spit every 4 hours PRN mouth hygiene  dextrose Gel 1 Dose(s) Oral once PRN Blood Glucose LESS THAN 70 milliGRAM(s)/deciliter  glucagon  Injectable 1 milliGRAM(s) IntraMuscular once PRN Glucose LESS THAN 70 milligrams/deciliter      LABS:                        8.6    8.98  )-----------( 236      ( 21 Feb 2018 07:35 )             27.3     02-21    136  |  97  |  51<H>  ----------------------------<  133<H>  6.6<HH>   |  26  |  2.67<H>    Ca    8.6      21 Feb 2018 11:52  Phos  5.8     02-21  Mg     2.9     02-21    TPro  7.1  /  Alb  2.6<L>  /  TBili  0.5  /  DBili  x   /  AST  76<H>  /  ALT  66<H>  /  AlkPhos  393<H>  02-21        Arterial Blood Gas:  02-21 @ 11:15  7.29/67/123/31/99/4.0  ABG lactate: --        MICROBIOLOGY:     RADIOLOGY:  [ ] Reviewed and interpreted by azael CERONG: CHIEF COMPLAINT:    HPI:  67F PMH HTN, DMT2 (70/30 TDD: 90 on admission), Breast Cancer (diagnosed in Feb 2017) currently on Herceptin (last dose Jan 20th), originally presented 1/30 with Fever found to have influenza subsequently went into PEA arrest x 2 with ROSC, complicated by bilateral pneumothoraces s/p 3 chest tubes and paracentesis decompression of the abdomen, then found to have PE s/p tPA 1/30, also s/p new CVA, and also new ESRD requiring almost daily HD.     Patient was admitted to MICU for further management. Patient was found to have acute renal failure and shock liver. Nephrology was consulted and patient was started on dialysis. Patient was started on Tamiflu for flu and zosyn for possible aspiration pneumonia. Patients chest tubes and peritoneal drain were removed. Patient redeveloped a pneumothorax on the L side and chest tube was reinserted. 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 Urine legionella was negative and azithromycin was discontinued. Patient continued to be anuric and continued to receive dialysis. Shock liver resolved. Patient was extubated on 2/8/17. Patient completed a 5 day course of tamiflu and 7 day course of zosyn and did not show signs of infection. Patients pneumothorax on L side resolved and chest tube was removed. Patients mental status improved and was alert and orientedx1-2 and spontaneously moves all extremities. Patient had an NG tube placed pending an official speech and swallow eval. Patient failed speech and swallow an a repeat speech and swallow was planned. Patient was then transferred to the floors for further management.     Per primary team, patient became tachypneic and hypoxic overnight at 1am, was switched from nasal cannula to BIPAP overnight. Tube feeds discontinued overnight. Cxray done at that time was unchanged. RRT initially called this AM for worsening tachypnea. Pt was evaluated at bedside breathing around 30s with belly breathing, hypoxic to 85% on 40% FIO2, increased to 100% with improvement in oxygen saturation to 97%. Cxray read as worsening pulm edema. ABG showed hypercapnia. Renal called for urgent HD. Also concern for asp pna as increasing density of RLL. 2nd RRT. Pt with worsening resp status, worsening tachypnea to 40s. Anesthesia called stat for intubation. GOC clarified with  who wanted full code.  After intubation, 3rd RRT called-not able to get BP.     PAST MEDICAL & SURGICAL HISTORY:  Diabetes  Breast CA  H/O mastectomy, bilateral      FAMILY HISTORY:  No pertinent family history in first degree relatives      SOCIAL HISTORY:  Recent Travel: No  Country of Birth: [place]  Advance Directives: Full Code    Allergies    doxycycline (Rash)  isoniazid (Rash)  NIFEdipine (Urticaria; Hives)  vitamin E (Short breath; Urticaria; Hives)    Intolerances        HOME MEDICATIONS:    REVIEW OF SYSTEMS:  [x] Unable to assess ROS because patient is intubated and sedated    OBJECTIVE:  ICU Vital Signs Last 24 Hrs  T(C): 36.5 (21 Feb 2018 12:45), Max: 36.9 (21 Feb 2018 06:00)  T(F): 97.7 (21 Feb 2018 12:45), Max: 98.5 (21 Feb 2018 06:00)  HR: 88 (21 Feb 2018 13:01) (78 - 98)  BP: 109/54 (21 Feb 2018 13:00) (109/54 - 174/74)  BP(mean): 78 (21 Feb 2018 13:00) (78 - 107)  ABP: --  ABP(mean): --  RR: 31 (21 Feb 2018 13:00) (18 - 31)  SpO2: 94% (21 Feb 2018 13:01) (90% - 100%)    Mode: AC/ CMV (Assist Control/ Continuous Mandatory Ventilation), RR (machine): 14, TV (machine): 450, FiO2: 40, PEEP: 5, ITime: 1, MAP: 13, PIP: 38    02-20 @ 07:01  -  02-21 @ 07:00  --------------------------------------------------------  IN: 0 mL / OUT: 1000 mL / NET: -1000 mL      CAPILLARY BLOOD GLUCOSE      POCT Blood Glucose.: 140 mg/dL (21 Feb 2018 11:57)    PHYSICAL EXAM:  General: Respiratory distress on BiPaP  Neurology: A&Ox2, CENTENO x 4  Eyes: PERRLA/ EOMI, Gross vision intact  ENT/Neck: Neck supple, trachea midline, No JVD, Gross hearing intact  Respiratory: R sided rhonchi. Clear L lung sounds anteriorly.  CV: RRR, +S1/S2, -S3/S4, no murmurs, rubs or gallops  Abdominal: Soft, NT, ND +BS, No HSM  MSK: Good tone. Unable to determine further as patient is intubated and sedated  Extremities: No edema, 2+ peripheral pulses  Skin: No Rashes, Hematoma, Ecchymosis  Tubes: SHAKIRA Martines (placed ~20d ago)    HOSPITAL MEDICATIONS:  MEDICATIONS  (STANDING):  aspirin  chewable 81 milliGRAM(s) Oral daily  carvedilol 12.5 milliGRAM(s) Oral every 12 hours  dextrose 5%. 1000 milliLiter(s) (50 mL/Hr) IV Continuous <Continuous>  dextrose 50% Injectable 12.5 Gram(s) IV Push once  dextrose 50% Injectable 25 Gram(s) IV Push once  dextrose 50% Injectable 25 Gram(s) IV Push once  heparin  Injectable 5000 Unit(s) SubCutaneous every 8 hours  hydrALAZINE 25 milliGRAM(s) Oral every 8 hours  insulin glargine Injectable (LANTUS) 15 Unit(s) SubCutaneous at bedtime  insulin lispro (HumaLOG) corrective regimen sliding scale   SubCutaneous every 6 hours  norepinephrine Infusion 0.07 MICROgram(s)/kG/Min (4.909 mL/Hr) IV Continuous <Continuous>  nystatin Powder 1 Application(s) Topical two times a day  propofol Infusion 1 MICROgram(s)/kG/Min (0.449 mL/Hr) IV Continuous <Continuous>  triamcinolone 0.1% Ointment 1 Application(s) Topical two times a day    MEDICATIONS  (PRN):  chlorhexidine 0.12% Liquid 15 milliLiter(s) Swish and Spit every 4 hours PRN mouth hygiene  dextrose Gel 1 Dose(s) Oral once PRN Blood Glucose LESS THAN 70 milliGRAM(s)/deciliter  glucagon  Injectable 1 milliGRAM(s) IntraMuscular once PRN Glucose LESS THAN 70 milligrams/deciliter      LABS:                        8.6    8.98  )-----------( 236      ( 21 Feb 2018 07:35 )             27.3     02-21    136  |  97  |  51<H>  ----------------------------<  133<H>  6.6<HH>   |  26  |  2.67<H>    Ca    8.6      21 Feb 2018 11:52  Phos  5.8     02-21  Mg     2.9     02-21    TPro  7.1  /  Alb  2.6<L>  /  TBili  0.5  /  DBili  x   /  AST  76<H>  /  ALT  66<H>  /  AlkPhos  393<H>  02-21        Arterial Blood Gas:  02-21 @ 11:15  7.29/67/123/31/99/4.0  ABG lactate: --        MICROBIOLOGY:   BCx: NTD  UCx: NTD    RADIOLOGY:  XRAY:   < from: Xray Chest 1 View AP/PA (02.21.18 @ 11:20) >  EXAM:  XR CHEST AP OR PA 1V                            PROCEDURE DATE:  02/21/2018            INTERPRETATION:  CLINICAL INFORMATION: Hypoxia and tachycardia. Recent   transfusions. Medical rapid response.    COMPARISON:  Chest x-ray dated 2/21/2018at 1:44 AM.    TECHNIQUE:   AP portable chest xray.    FINDINGS:   The patient is slightly rotated.    Right IJ central venous catheter with tip in the SVC.  Left chest wall Mediport with tip in the SVC.  Enteric tube courses below the diaphragm her the tip is not captured in   this image.     Appearance of worsening right pleural effusion, may be secondary to   patient's positioning and technique.  Interval worsening of pulmonary edema.  No evidence of pneumothorax.  The cardiac silhouette is not accurately assessed in this projection.    IMPRESSION:  Appearance of worsening right pleural effusion as above.  Interval worsening of pulmonary edema.    < end of copied text >    [x] Reviewed and interpreted by me    EKG: Pending

## 2018-02-21 NOTE — PROGRESS NOTE ADULT - SUBJECTIVE AND OBJECTIVE BOX
Follow-up Pulm Progress Note  Caleb Mon MD  [phone]    Events noted  Tachypnea, hypoxemia, and acute resp acidosis this AM  CXR limited AP  - overpenetrated: incr opacity on R 1/2, poss incr congestion  Patient with paradoxical breathing on BIPAP with ph 7.28    Vital Signs Last 24 Hrs  T(C): 37.1 (20 Feb 2018 05:28), Max: 37.1 (20 Feb 2018 05:28)  T(F): 98.7 (20 Feb 2018 05:28), Max: 98.7 (20 Feb 2018 05:28)  HR: 90 (20 Feb 2018 09:23) (78 - 90)  BP: 148/63 (20 Feb 2018 05:28) (101/44 - 148/63)  BP(mean): --  RR: 18 (20 Feb 2018 05:28) (17 - 18)  SpO2: 96% (20 Feb 2018 09:23) (96% - 100%)                       6.4    7.28  )-----------( 183      ( 20 Feb 2018 07:22 )             21.2       02-20    135  |  94<L>  |  51<H>  ----------------------------<  296<H>  5.0   |  32<H>  |  3.02<H>    Ca    8.7      20 Feb 2018 07:21        CULTURES:    Physical Examination:  PULM: OVert paradox on BIPAP; no wheeze, decr esc  CVS: Regular rate and rhythm, no murmurs, rubs, or gallops  ABD: Soft, non-tender  EXT:  No clubbing, cyanosis, or edema    RADIOLOGY REVIEWED  CXR:    CT chest:    TTE:

## 2018-02-21 NOTE — PROGRESS NOTE ADULT - SUBJECTIVE AND OBJECTIVE BOX
Chief Complaint/Follow-up on: T2DM    Subjective: Pt was intubated for airway protection as she had mental status changes and respiratory distress. She is currently on the respiorator and NPO. last dose of NPH was given around 12am so bs have been controlled today.    MEDICATIONS  (STANDING):  aspirin  chewable 81 milliGRAM(s) Oral daily  dextrose 5%. 1000 milliLiter(s) (50 mL/Hr) IV Continuous <Continuous>  dextrose 50% Injectable 12.5 Gram(s) IV Push once  dextrose 50% Injectable 25 Gram(s) IV Push once  dextrose 50% Injectable 25 Gram(s) IV Push once  heparin  Injectable 5000 Unit(s) SubCutaneous every 8 hours  insulin glargine Injectable (LANTUS) 15 Unit(s) SubCutaneous at bedtime  insulin lispro (HumaLOG) corrective regimen sliding scale   SubCutaneous every 6 hours  norepinephrine Infusion 0.07 MICROgram(s)/kG/Min (4.909 mL/Hr) IV Continuous <Continuous>  nystatin Powder 1 Application(s) Topical two times a day  pantoprazole  Injectable 40 milliGRAM(s) IV Push daily  piperacillin/tazobactam IVPB. 3.375 Gram(s) IV Intermittent every 12 hours  propofol Infusion 1 MICROgram(s)/kG/Min (0.449 mL/Hr) IV Continuous <Continuous>  triamcinolone 0.1% Ointment 1 Application(s) Topical two times a day    MEDICATIONS  (PRN):  chlorhexidine 0.12% Liquid 15 milliLiter(s) Swish and Spit every 4 hours PRN mouth hygiene  dextrose Gel 1 Dose(s) Oral once PRN Blood Glucose LESS THAN 70 milliGRAM(s)/deciliter  glucagon  Injectable 1 milliGRAM(s) IntraMuscular once PRN Glucose LESS THAN 70 milligrams/deciliter      PHYSICAL EXAM:  VITALS: T(C): 36.5 (02-21-18 @ 12:45)  T(F): 97.7 (02-21-18 @ 12:45), Max: 98.5 (02-21-18 @ 06:00)  HR: 85 (02-21-18 @ 16:27) (78 - 98)  BP: 124/49 (02-21-18 @ 16:00) (84/47 - 174/74)  RR:  (18 - 39)  SpO2:  (90% - 100%)  Wt(kg): --  GENERAL: NAD, well-groomed, sedated  HEENT:  Atraumatic, Normocephalic, +intuabted  RESPIRATORY: Clear to auscultation bilaterally; No rales, rhonchi, wheezing, or rubs  CARDIOVASCULAR: Regular rate and rhythm; No murmurs; no peripheral edema  GI: Soft, nontender, non distended, normal bowel sounds      POCT Blood Glucose.: 140 mg/dL (02-21-18 @ 11:57)  POCT Blood Glucose.: 135 mg/dL (02-21-18 @ 11:06)  POCT Blood Glucose.: 251 mg/dL (02-21-18 @ 05:37)  POCT Blood Glucose.: 285 mg/dL (02-20-18 @ 23:32)  POCT Blood Glucose.: 208 mg/dL (02-20-18 @ 18:38)  POCT Blood Glucose.: 258 mg/dL (02-20-18 @ 16:30)  POCT Blood Glucose.: 181 mg/dL (02-20-18 @ 12:09)  POCT Blood Glucose.: 262 mg/dL (02-20-18 @ 06:03)  POCT Blood Glucose.: 337 mg/dL (02-20-18 @ 01:02)  POCT Blood Glucose.: 347 mg/dL (02-19-18 @ 18:28)  POCT Blood Glucose.: 337 mg/dL (02-19-18 @ 18:08)  POCT Blood Glucose.: 170 mg/dL (02-19-18 @ 12:29)  POCT Blood Glucose.: 259 mg/dL (02-19-18 @ 06:59)  POCT Blood Glucose.: 284 mg/dL (02-19-18 @ 00:30)  POCT Blood Glucose.: 226 mg/dL (02-18-18 @ 17:53)    02-21    132<L>  |  95<L>  |  55<H>  ----------------------------<  147<H>  9.0<HH>   |  24  |  2.85<H>    EGFR if : 19<L>  EGFR if non : 16<L>    Ca    8.5      02-21  Mg     2.9     02-21  Phos  6.6     02-21    TPro  7.2  /  Alb  2.7<L>  /  TBili  0.6  /  DBili  x   /  AST  107<H>  /  ALT  64<H>  /  AlkPhos  390<H>  02-21              Hemoglobin A1C, Whole Blood: 8.6 % <H> [4.0 - 5.6] (01-31-18 @ 07:15)  Hemoglobin A1C, Whole Blood: 8.7 % <H> [4.0 - 5.6] (01-31-18 @ 05:51)

## 2018-02-21 NOTE — CHART NOTE - NSCHARTNOTEFT_GEN_A_CORE
Notified by RN; family is complaining that pt. is having labored breathing. Pt. seen and examined. Pt. has received 1 U of PRBC and is currently on tube feeds. Notified by RN; family is complaining that pt. is having labored breathing. Pt. seen and examined. Pt. has received 1 U of PRBC and is currently on tube feeds. Suspect possible fluid overload. On auscultation, pt. sounds congested. Pt. placed on BiPAP and, as per family, appears to much better. Duonebs x 1 ordered. CXR and ABG pending. Consider holding tube feeds based on CXR result. Will f/u results. Continue to monitor pt. on telemetry. F/u w/ primary team in AM.    -Marylu Chamorro PA-C. #[id number].

## 2018-02-21 NOTE — PROGRESS NOTE ADULT - ASSESSMENT
ACute hypoxemic and hypercapneic resp failure: impending resp failure on BIPAP  CXR c/w new RLL pneumonia vs fluid overload    RUSS progressed to ESRD on HD  S/P CAC with rib fractures and bilateral pneumothoraces  CVA with corical and subcortical infarcts  s/p cardiac arrest    REC    MICU evaluation and sugg transfer to monitored setting  May need intubation  HD for fluid removal: preferrably in ICU setting  Increase BIPAP to 18/5  D/W RRT team

## 2018-02-21 NOTE — PROGRESS NOTE ADULT - SUBJECTIVE AND OBJECTIVE BOX
Called to patient's bedside for intubation.  Therapy initiated by primary medical team.    Patient Assessment:   Patient in respiratory distress. Patient tachypneic in 30's on Bipap.  Baseline Vital Signs:  BP: 156/71  HR: 84  RR: 34  SpO2: 96    Medications Administered: 30mg, propofol, 40mg rocuronium    Intubation:      [x ] Direct Laryngoscopy    [ ] Video-Assisted Laryngoscopy   [ ] Fiberoptic Intubation    Blade: Mac 3  Cormack-Lehane View: [x ] 1     [ ] 2A     [ ] 2B     [ ] 3     [ ]  4  ETT Size: 7.5  Marking at Teeth: 22cm    Post-Intubation Vital Signs:  BP: 132/68  HR: 92  RR: 14  SpO2: 99    Positive end-tidal carbon dioxide via EasyCap, positive bilateral breath sounds.  CXR to be reviewed by primary team.  Atraumatic intubation with no complications.

## 2018-02-21 NOTE — PROGRESS NOTE ADULT - ATTENDING COMMENTS
Acute respiratory failure, likely aspiration  1.  ARF--no HD required today.  Will reassess tomorrow  2.  Pneumonia, aspiration--suctioning + lung US c/w dx.  No requirement for aggressive IF

## 2018-02-21 NOTE — PROGRESS NOTE ADULT - SUBJECTIVE AND OBJECTIVE BOX
[facility] DIVISION OF KIDNEY DISEASES AND HYPERTENSION -- FOLLOW UP NOTE  --------------------------------------------------------------------------------  Chief Complaint: RUSS    24 hour events/subjective:  s/p HD with 1 L yesterday.  RRT called for worsening tachypnea, intubated for resp failure.        PAST HISTORY  --------------------------------------------------------------------------------  No significant changes to PMH, PSH, FHx, SHx, unless otherwise noted    ALLERGIES & MEDICATIONS  --------------------------------------------------------------------------------  Allergies    doxycycline (Rash)  isoniazid (Rash)  NIFEdipine (Urticaria; Hives)  vitamin E (Short breath; Urticaria; Hives)    Intolerances      Standing Inpatient Medications  aspirin  chewable 81 milliGRAM(s) Oral daily  dextrose 5%. 1000 milliLiter(s) IV Continuous <Continuous>  dextrose 50% Injectable 12.5 Gram(s) IV Push once  dextrose 50% Injectable 25 Gram(s) IV Push once  dextrose 50% Injectable 25 Gram(s) IV Push once  heparin  Injectable 5000 Unit(s) SubCutaneous every 8 hours  insulin glargine Injectable (LANTUS) 15 Unit(s) SubCutaneous at bedtime  insulin lispro (HumaLOG) corrective regimen sliding scale   SubCutaneous every 6 hours  norepinephrine Infusion 0.07 MICROgram(s)/kG/Min IV Continuous <Continuous>  nystatin Powder 1 Application(s) Topical two times a day  pantoprazole  Injectable 40 milliGRAM(s) IV Push daily  piperacillin/tazobactam IVPB. 3.375 Gram(s) IV Intermittent every 12 hours  propofol Infusion 1 MICROgram(s)/kG/Min IV Continuous <Continuous>  triamcinolone 0.1% Ointment 1 Application(s) Topical two times a day    PRN Inpatient Medications  chlorhexidine 0.12% Liquid 15 milliLiter(s) Swish and Spit every 4 hours PRN  dextrose Gel 1 Dose(s) Oral once PRN  glucagon  Injectable 1 milliGRAM(s) IntraMuscular once PRN      REVIEW OF SYSTEMS  --------------------------------------------------------------------------------  unable to obtain    VITALS/PHYSICAL EXAM  --------------------------------------------------------------------------------  T(C): 36.5 (02-21-18 @ 12:45), Max: 36.9 (02-21-18 @ 06:00)  HR: 85 (02-21-18 @ 18:15) (78 - 98)  BP: 112/57 (02-21-18 @ 18:00) (84/47 - 174/74)  RR: 25 (02-21-18 @ 18:15) (18 - 51)  SpO2: 99% (02-21-18 @ 18:15) (90% - 100%)  Wt(kg): --        02-20-18 @ 07:01  -  02-21-18 @ 07:00  --------------------------------------------------------  IN: 0 mL / OUT: 1000 mL / NET: -1000 mL      Physical Exam:  	Gen: intubated  	Pulm: vent sounds  	CV: RRR, S1S2; no rub  	Abd: +BS, soft,   	: No suprapubic tenderness  	UE: Warm,; no edema  	LE: Warm, no edema  	Skin: Warm  	Vascular access:    LABS/STUDIES  --------------------------------------------------------------------------------              9.2    15.7  >-----------<  280      [02-21-18 @ 16:35]              27.9     134  |  95  |  53  ----------------------------<  206      [02-21-18 @ 16:35]  5.5   |  25  |  3.08        Ca     8.5     [02-21-18 @ 16:35]      Mg     2.4     [02-21-18 @ 16:35]      Phos  5.4     [02-21-18 @ 16:35]    TPro  6.9  /  Alb  2.7  /  TBili  0.4  /  DBili  x   /  AST  32  /  ALT  55  /  AlkPhos  362  [02-21-18 @ 16:35]    PT/INR: PT 9.8  , INR 0.91       [02-21-18 @ 16:35]  PTT: 24.9       [02-21-18 @ 16:35]      Creatinine Trend:  SCr 3.08 [02-21 @ 16:35]  SCr 2.85 [02-21 @ 15:21]  SCr 2.67 [02-21 @ 11:52]  SCr 2.99 [02-21 @ 07:49]  SCr 3.02 [02-20 @ 07:21]    Urinalysis - [01-30-18 @ 12:52]      Color Yellow / Appearance SL Turbid / SG 1.025 / pH 6.0      Gluc 50 / Ketone Negative  / Bili Negative / Urobili Negative       Blood Trace / Protein 150 / Leuk Est Negative / Nitrite Negative      RBC 3-5 / WBC  / Hyaline  / Gran  / Sq Epi  / Non Sq Epi OCC / Bacteria       HbA1c 8.6      [01-31-18 @ 07:15]  Lipid: chol 183, , HDL 10,       [02-07-18 @ 13:34]    HBsAg Nonreact      [02-01-18 @ 22:49]  HCV 0.16, Nonreact      [02-01-18 @ 22:49]

## 2018-02-22 LAB
ANION GAP SERPL CALC-SCNC: 15 MMOL/L — SIGNIFICANT CHANGE UP (ref 5–17)
BUN SERPL-MCNC: 60 MG/DL — HIGH (ref 7–23)
CALCIUM SERPL-MCNC: 8.4 MG/DL — SIGNIFICANT CHANGE UP (ref 8.4–10.5)
CHLORIDE SERPL-SCNC: 96 MMOL/L — SIGNIFICANT CHANGE UP (ref 96–108)
CO2 SERPL-SCNC: 25 MMOL/L — SIGNIFICANT CHANGE UP (ref 22–31)
CREAT SERPL-MCNC: 3.71 MG/DL — HIGH (ref 0.5–1.3)
GLUCOSE BLDC GLUCOMTR-MCNC: 141 MG/DL — HIGH (ref 70–99)
GLUCOSE BLDC GLUCOMTR-MCNC: 168 MG/DL — HIGH (ref 70–99)
GLUCOSE BLDC GLUCOMTR-MCNC: 191 MG/DL — HIGH (ref 70–99)
GLUCOSE BLDC GLUCOMTR-MCNC: 97 MG/DL — SIGNIFICANT CHANGE UP (ref 70–99)
GLUCOSE SERPL-MCNC: 203 MG/DL — HIGH (ref 70–99)
HCT VFR BLD CALC: 25.6 % — LOW (ref 34.5–45)
HGB BLD-MCNC: 8.4 G/DL — LOW (ref 11.5–15.5)
MAGNESIUM SERPL-MCNC: 2.6 MG/DL — SIGNIFICANT CHANGE UP (ref 1.6–2.6)
MCHC RBC-ENTMCNC: 31.6 PG — SIGNIFICANT CHANGE UP (ref 27–34)
MCHC RBC-ENTMCNC: 32.9 GM/DL — SIGNIFICANT CHANGE UP (ref 32–36)
MCV RBC AUTO: 96.2 FL — SIGNIFICANT CHANGE UP (ref 80–100)
PHOSPHATE SERPL-MCNC: 5.4 MG/DL — HIGH (ref 2.5–4.5)
PLATELET # BLD AUTO: 285 K/UL — SIGNIFICANT CHANGE UP (ref 150–400)
POTASSIUM SERPL-MCNC: 5.2 MMOL/L — SIGNIFICANT CHANGE UP (ref 3.5–5.3)
POTASSIUM SERPL-SCNC: 5.2 MMOL/L — SIGNIFICANT CHANGE UP (ref 3.5–5.3)
RBC # BLD: 2.66 M/UL — LOW (ref 3.8–5.2)
RBC # FLD: 16 % — HIGH (ref 10.3–14.5)
SODIUM SERPL-SCNC: 136 MMOL/L — SIGNIFICANT CHANGE UP (ref 135–145)
VANCOMYCIN FLD-MCNC: 13.1 UG/ML — SIGNIFICANT CHANGE UP
VANCOMYCIN TROUGH SERPL-MCNC: 9.1 UG/ML — LOW (ref 10–20)
WBC # BLD: 18.8 K/UL — HIGH (ref 3.8–10.5)
WBC # FLD AUTO: 18.8 K/UL — HIGH (ref 3.8–10.5)

## 2018-02-22 PROCEDURE — 71045 X-RAY EXAM CHEST 1 VIEW: CPT | Mod: 26

## 2018-02-22 PROCEDURE — 99232 SBSQ HOSP IP/OBS MODERATE 35: CPT

## 2018-02-22 PROCEDURE — 99233 SBSQ HOSP IP/OBS HIGH 50: CPT | Mod: GC

## 2018-02-22 PROCEDURE — 99291 CRITICAL CARE FIRST HOUR: CPT | Mod: 25

## 2018-02-22 PROCEDURE — 93308 TTE F-UP OR LMTD: CPT | Mod: 26

## 2018-02-22 PROCEDURE — 76604 US EXAM CHEST: CPT | Mod: 26

## 2018-02-22 RX ORDER — DEXTROSE 50 % IN WATER 50 %
1 SYRINGE (ML) INTRAVENOUS ONCE
Qty: 0 | Refills: 0 | Status: DISCONTINUED | OUTPATIENT
Start: 2018-02-22 | End: 2018-02-22

## 2018-02-22 RX ORDER — GLUCAGON INJECTION, SOLUTION 0.5 MG/.1ML
1 INJECTION, SOLUTION SUBCUTANEOUS ONCE
Qty: 0 | Refills: 0 | Status: DISCONTINUED | OUTPATIENT
Start: 2018-02-22 | End: 2018-02-22

## 2018-02-22 RX ORDER — GLUCAGON INJECTION, SOLUTION 0.5 MG/.1ML
1 INJECTION, SOLUTION SUBCUTANEOUS ONCE
Qty: 0 | Refills: 0 | Status: DISCONTINUED | OUTPATIENT
Start: 2018-02-22 | End: 2018-02-25

## 2018-02-22 RX ORDER — DEXTROSE 50 % IN WATER 50 %
1 SYRINGE (ML) INTRAVENOUS ONCE
Qty: 0 | Refills: 0 | Status: DISCONTINUED | OUTPATIENT
Start: 2018-02-22 | End: 2018-02-25

## 2018-02-22 RX ORDER — DEXTROSE 50 % IN WATER 50 %
25 SYRINGE (ML) INTRAVENOUS ONCE
Qty: 0 | Refills: 0 | Status: DISCONTINUED | OUTPATIENT
Start: 2018-02-22 | End: 2018-02-22

## 2018-02-22 RX ORDER — INSULIN LISPRO 100/ML
VIAL (ML) SUBCUTANEOUS
Qty: 0 | Refills: 0 | Status: DISCONTINUED | OUTPATIENT
Start: 2018-02-22 | End: 2018-02-22

## 2018-02-22 RX ORDER — DEXTROSE 50 % IN WATER 50 %
12.5 SYRINGE (ML) INTRAVENOUS ONCE
Qty: 0 | Refills: 0 | Status: DISCONTINUED | OUTPATIENT
Start: 2018-02-22 | End: 2018-02-22

## 2018-02-22 RX ORDER — INSULIN LISPRO 100/ML
VIAL (ML) SUBCUTANEOUS EVERY 6 HOURS
Qty: 0 | Refills: 0 | Status: DISCONTINUED | OUTPATIENT
Start: 2018-02-22 | End: 2018-02-25

## 2018-02-22 RX ORDER — SODIUM CHLORIDE 9 MG/ML
1000 INJECTION, SOLUTION INTRAVENOUS
Qty: 0 | Refills: 0 | Status: DISCONTINUED | OUTPATIENT
Start: 2018-02-22 | End: 2018-03-16

## 2018-02-22 RX ORDER — SODIUM CHLORIDE 9 MG/ML
1000 INJECTION, SOLUTION INTRAVENOUS
Qty: 0 | Refills: 0 | Status: DISCONTINUED | OUTPATIENT
Start: 2018-02-22 | End: 2018-02-25

## 2018-02-22 RX ORDER — INSULIN GLARGINE 100 [IU]/ML
7 INJECTION, SOLUTION SUBCUTANEOUS AT BEDTIME
Qty: 0 | Refills: 0 | Status: DISCONTINUED | OUTPATIENT
Start: 2018-02-22 | End: 2018-02-23

## 2018-02-22 RX ORDER — DEXTROSE 50 % IN WATER 50 %
25 SYRINGE (ML) INTRAVENOUS ONCE
Qty: 0 | Refills: 0 | Status: DISCONTINUED | OUTPATIENT
Start: 2018-02-22 | End: 2018-02-25

## 2018-02-22 RX ORDER — ACETAMINOPHEN 500 MG
650 TABLET ORAL ONCE
Qty: 0 | Refills: 0 | Status: COMPLETED | OUTPATIENT
Start: 2018-02-22 | End: 2018-02-22

## 2018-02-22 RX ORDER — DEXTROSE 50 % IN WATER 50 %
12.5 SYRINGE (ML) INTRAVENOUS ONCE
Qty: 0 | Refills: 0 | Status: DISCONTINUED | OUTPATIENT
Start: 2018-02-22 | End: 2018-02-25

## 2018-02-22 RX ADMIN — NYSTATIN CREAM 1 APPLICATION(S): 100000 CREAM TOPICAL at 18:05

## 2018-02-22 RX ADMIN — ALBUTEROL 2.5 MILLIGRAM(S): 90 AEROSOL, METERED ORAL at 00:25

## 2018-02-22 RX ADMIN — Medication 2: at 05:49

## 2018-02-22 RX ADMIN — HEPARIN SODIUM 5000 UNIT(S): 5000 INJECTION INTRAVENOUS; SUBCUTANEOUS at 13:37

## 2018-02-22 RX ADMIN — INSULIN GLARGINE 7 UNIT(S): 100 INJECTION, SOLUTION SUBCUTANEOUS at 21:41

## 2018-02-22 RX ADMIN — PIPERACILLIN AND TAZOBACTAM 25 GRAM(S): 4; .5 INJECTION, POWDER, LYOPHILIZED, FOR SOLUTION INTRAVENOUS at 05:18

## 2018-02-22 RX ADMIN — PROPOFOL 0.45 MICROGRAM(S)/KG/MIN: 10 INJECTION, EMULSION INTRAVENOUS at 05:18

## 2018-02-22 RX ADMIN — Medication 1: at 21:42

## 2018-02-22 RX ADMIN — NYSTATIN CREAM 1 APPLICATION(S): 100000 CREAM TOPICAL at 05:18

## 2018-02-22 RX ADMIN — Medication 81 MILLIGRAM(S): at 12:18

## 2018-02-22 RX ADMIN — HEPARIN SODIUM 5000 UNIT(S): 5000 INJECTION INTRAVENOUS; SUBCUTANEOUS at 21:36

## 2018-02-22 RX ADMIN — Medication 1 APPLICATION(S): at 05:19

## 2018-02-22 RX ADMIN — PIPERACILLIN AND TAZOBACTAM 25 GRAM(S): 4; .5 INJECTION, POWDER, LYOPHILIZED, FOR SOLUTION INTRAVENOUS at 18:05

## 2018-02-22 RX ADMIN — Medication 1 APPLICATION(S): at 18:05

## 2018-02-22 RX ADMIN — SODIUM POLYSTYRENE SULFONATE 30 GRAM(S): 4.1 POWDER, FOR SUSPENSION ORAL at 00:17

## 2018-02-22 RX ADMIN — PANTOPRAZOLE SODIUM 40 MILLIGRAM(S): 20 TABLET, DELAYED RELEASE ORAL at 12:17

## 2018-02-22 RX ADMIN — HEPARIN SODIUM 5000 UNIT(S): 5000 INJECTION INTRAVENOUS; SUBCUTANEOUS at 05:18

## 2018-02-22 RX ADMIN — Medication 650 MILLIGRAM(S): at 21:32

## 2018-02-22 NOTE — PROGRESS NOTE ADULT - SUBJECTIVE AND OBJECTIVE BOX
[facility] DIVISION OF KIDNEY DISEASES AND HYPERTENSION -- FOLLOW UP NOTE  --------------------------------------------------------------------------------  Chief Complaint:    24 hour events/subjective:        PAST HISTORY  --------------------------------------------------------------------------------  No significant changes to PMH, PSH, FHx, SHx, unless otherwise noted    ALLERGIES & MEDICATIONS  --------------------------------------------------------------------------------  Allergies    doxycycline (Rash)  isoniazid (Rash)  NIFEdipine (Urticaria; Hives)  vitamin E (Short breath; Urticaria; Hives)    Intolerances      Standing Inpatient Medications  aspirin  chewable 81 milliGRAM(s) Oral daily  dextrose 5%. 1000 milliLiter(s) IV Continuous <Continuous>  dextrose 50% Injectable 12.5 Gram(s) IV Push once  dextrose 50% Injectable 25 Gram(s) IV Push once  dextrose 50% Injectable 25 Gram(s) IV Push once  heparin  Injectable 5000 Unit(s) SubCutaneous every 8 hours  insulin glargine Injectable (LANTUS) 7.5 Unit(s) SubCutaneous at bedtime  insulin lispro (HumaLOG) corrective regimen sliding scale   SubCutaneous every 6 hours  norepinephrine Infusion 0.07 MICROgram(s)/kG/Min IV Continuous <Continuous>  nystatin Powder 1 Application(s) Topical two times a day  pantoprazole  Injectable 40 milliGRAM(s) IV Push daily  piperacillin/tazobactam IVPB. 3.375 Gram(s) IV Intermittent every 12 hours  propofol Infusion 1 MICROgram(s)/kG/Min IV Continuous <Continuous>  triamcinolone 0.1% Ointment 1 Application(s) Topical two times a day    PRN Inpatient Medications  chlorhexidine 0.12% Liquid 15 milliLiter(s) Swish and Spit every 4 hours PRN  dextrose Gel 1 Dose(s) Oral once PRN  glucagon  Injectable 1 milliGRAM(s) IntraMuscular once PRN      REVIEW OF SYSTEMS  --------------------------------------------------------------------------------  Gen: No weight changes, fatigue, fevers/chills, weakness  Skin: No rashes  Head/Eyes/Ears/Mouth: No headache; Normal hearing; Normal vision w/o blurriness; No sinus pain/discomfort, sore throat  Respiratory: No dyspnea, cough, wheezing, hemoptysis  CV: No chest pain, PND, orthopnea  GI: No abdominal pain, diarrhea, constipation, nausea, vomiting, melena, hematochezia  : No increased frequency, dysuria, hematuria, nocturia  MSK: No joint pain/swelling; no back pain; no edema  Neuro: No dizziness/lightheadedness, weakness, seizures, numbness, tingling  Heme: No easy bruising or bleeding  Endo: No heat/cold intolerance  Psych: No significant nervousness, anxiety, stress, depression    All other systems were reviewed and are negative, except as noted.    VITALS/PHYSICAL EXAM  --------------------------------------------------------------------------------  T(C): 36.2 (02-22-18 @ 08:00), Max: 38.6 (02-21-18 @ 19:00)  HR: 86 (02-22-18 @ 08:30) (67 - 90)  BP: 86/42 (02-22-18 @ 08:30) (84/47 - 174/74)  RR: 28 (02-22-18 @ 08:30) (16 - 51)  SpO2: 100% (02-22-18 @ 08:30) (90% - 100%)  Wt(kg): --        02-21-18 @ 07:01  -  02-22-18 @ 07:00  --------------------------------------------------------  IN: 393.9 mL / OUT: 0 mL / NET: 393.9 mL    02-22-18 @ 07:01  -  02-22-18 @ 09:06  --------------------------------------------------------  IN: 34.5 mL / OUT: 0 mL / NET: 34.5 mL      Physical Exam:  	Gen: NAD, well-appearing  	HEENT: PERRL, supple neck, clear oropharynx  	Pulm: CTA B/L  	CV: RRR, S1S2; no rub  	Back: No spinal or CVA tenderness; no sacral edema  	Abd: +BS, soft, nontender/nondistended  	: No suprapubic tenderness  	UE: Warm, FROM, no clubbing, intact strength; no edema; no asterixis  	LE: Warm, FROM, no clubbing, intact strength; no edema  	Neuro: No focal deficits, intact gait  	Psych: Normal affect and mood  	Skin: Warm, without rashes  	Vascular access:    LABS/STUDIES  --------------------------------------------------------------------------------              8.2    11.5  >-----------<  203      [02-21-18 @ 22:55]              24.6     136  |  96  |  60  ----------------------------<  203      [02-22-18 @ 03:37]  5.2   |  25  |  3.71        Ca     8.4     [02-22-18 @ 03:37]      Mg     2.6     [02-22-18 @ 03:37]      Phos  5.4     [02-22-18 @ 03:37]    TPro  6.2  /  Alb  2.3  /  TBili  0.5  /  DBili  x   /  AST  27  /  ALT  42  /  AlkPhos  307  [02-21-18 @ 22:55]    PT/INR: PT 10.8 , INR 1.00       [02-21-18 @ 22:55]  PTT: 24.9       [02-21-18 @ 22:55]      Creatinine Trend:  SCr 3.71 [02-22 @ 03:37]  SCr 3.35 [02-21 @ 22:55]  SCr 3.08 [02-21 @ 16:35]  SCr 2.85 [02-21 @ 15:21]  SCr 2.67 [02-21 @ 11:52]    Urinalysis - [01-30-18 @ 12:52]      Color Yellow / Appearance SL Turbid / SG 1.025 / pH 6.0      Gluc 50 / Ketone Negative  / Bili Negative / Urobili Negative       Blood Trace / Protein 150 / Leuk Est Negative / Nitrite Negative      RBC 3-5 / WBC  / Hyaline  / Gran  / Sq Epi  / Non Sq Epi OCC / Bacteria       HbA1c 8.6      [01-31-18 @ 07:15]  Lipid: chol 183, , HDL 10,       [02-07-18 @ 13:34]    HBsAg Nonreact      [02-01-18 @ 22:49]  HCV 0.16, Nonreact      [02-01-18 @ 22:49] [facility] DIVISION OF KIDNEY DISEASES AND HYPERTENSION -- FOLLOW UP NOTE  --------------------------------------------------------------------------------  Chief Complaint: RUSS    24 hour events/subjective:  intubated and sedated  currently on levo gtt      PAST HISTORY  --------------------------------------------------------------------------------  No significant changes to PMH, PSH, FHx, SHx, unless otherwise noted    ALLERGIES & MEDICATIONS  --------------------------------------------------------------------------------  Allergies    doxycycline (Rash)  isoniazid (Rash)  NIFEdipine (Urticaria; Hives)  vitamin E (Short breath; Urticaria; Hives)    Intolerances      Standing Inpatient Medications  aspirin  chewable 81 milliGRAM(s) Oral daily  dextrose 5%. 1000 milliLiter(s) IV Continuous <Continuous>  dextrose 50% Injectable 12.5 Gram(s) IV Push once  dextrose 50% Injectable 25 Gram(s) IV Push once  dextrose 50% Injectable 25 Gram(s) IV Push once  heparin  Injectable 5000 Unit(s) SubCutaneous every 8 hours  insulin glargine Injectable (LANTUS) 7.5 Unit(s) SubCutaneous at bedtime  insulin lispro (HumaLOG) corrective regimen sliding scale   SubCutaneous every 6 hours  norepinephrine Infusion 0.07 MICROgram(s)/kG/Min IV Continuous <Continuous>  nystatin Powder 1 Application(s) Topical two times a day  pantoprazole  Injectable 40 milliGRAM(s) IV Push daily  piperacillin/tazobactam IVPB. 3.375 Gram(s) IV Intermittent every 12 hours  propofol Infusion 1 MICROgram(s)/kG/Min IV Continuous <Continuous>  triamcinolone 0.1% Ointment 1 Application(s) Topical two times a day    PRN Inpatient Medications  chlorhexidine 0.12% Liquid 15 milliLiter(s) Swish and Spit every 4 hours PRN  dextrose Gel 1 Dose(s) Oral once PRN  glucagon  Injectable 1 milliGRAM(s) IntraMuscular once PRN      REVIEW OF SYSTEMS  --------------------------------------------------------------------------------  unable to obtain      VITALS/PHYSICAL EXAM  --------------------------------------------------------------------------------  T(C): 36.2 (02-22-18 @ 08:00), Max: 38.6 (02-21-18 @ 19:00)  HR: 86 (02-22-18 @ 08:30) (67 - 90)  BP: 86/42 (02-22-18 @ 08:30) (84/47 - 174/74)  RR: 28 (02-22-18 @ 08:30) (16 - 51)  SpO2: 100% (02-22-18 @ 08:30) (90% - 100%)  Wt(kg): --        02-21-18 @ 07:01  -  02-22-18 @ 07:00  --------------------------------------------------------  IN: 393.9 mL / OUT: 0 mL / NET: 393.9 mL    02-22-18 @ 07:01  -  02-22-18 @ 09:06  --------------------------------------------------------  IN: 34.5 mL / OUT: 0 mL / NET: 34.5 mL      Physical Exam:  	Gen: intubated  	Pulm: vent sounds  	CV: RRR, S1S2; no rub  	Abd: +BS, soft,   	: No suprapubic tenderness  	UE: Warm,; no edema  	LE: Warm, no edema  	Skin: Warm  	Vascular access: RIJ non tunneled catheter       LABS/STUDIES  --------------------------------------------------------------------------------              8.2    11.5  >-----------<  203      [02-21-18 @ 22:55]              24.6     136  |  96  |  60  ----------------------------<  203      [02-22-18 @ 03:37]  5.2   |  25  |  3.71        Ca     8.4     [02-22-18 @ 03:37]      Mg     2.6     [02-22-18 @ 03:37]      Phos  5.4     [02-22-18 @ 03:37]    TPro  6.2  /  Alb  2.3  /  TBili  0.5  /  DBili  x   /  AST  27  /  ALT  42  /  AlkPhos  307  [02-21-18 @ 22:55]    PT/INR: PT 10.8 , INR 1.00       [02-21-18 @ 22:55]  PTT: 24.9       [02-21-18 @ 22:55]      Creatinine Trend:  SCr 3.71 [02-22 @ 03:37]  SCr 3.35 [02-21 @ 22:55]  SCr 3.08 [02-21 @ 16:35]  SCr 2.85 [02-21 @ 15:21]  SCr 2.67 [02-21 @ 11:52]    Urinalysis - [01-30-18 @ 12:52]      Color Yellow / Appearance SL Turbid / SG 1.025 / pH 6.0      Gluc 50 / Ketone Negative  / Bili Negative / Urobili Negative       Blood Trace / Protein 150 / Leuk Est Negative / Nitrite Negative      RBC 3-5 / WBC  / Hyaline  / Gran  / Sq Epi  / Non Sq Epi OCC / Bacteria       HbA1c 8.6      [01-31-18 @ 07:15]  Lipid: chol 183, , HDL 10,       [02-07-18 @ 13:34]    HBsAg Nonreact      [02-01-18 @ 22:49]  HCV 0.16, Nonreact      [02-01-18 @ 22:49]

## 2018-02-22 NOTE — CHART NOTE - NSCHARTNOTEFT_GEN_A_CORE
Patient seen for nutrition follow-up assessment on MICU  68 yo female with PMH of HTN, T2DM, Breast CA on Herceptin (last dose 1/20) initially presented with Fever found to have influenza subsequently went into PEA arrest x 2, complicated by bilateral pneumothoraces S/P chest tubes x3 and paracentesis, then found to have PE S/P tPA, also S/P new CVA, and new ESRD requiring HD. Course further complicated by hypoxic respiratory failure likely 2/2 aspiration event with RRT call and transfer back to the MICU.    Source: Patient [ ]    Family [ ]     other [x- medical record]    Diet : NPO   Per chart, patient previously receiving tube feeds of Vital AF 1.2 @ 80cc/hr x 18 hrs to provide 1440cc fluid, 1728cal/d (23cal/Kg), and 108gm prot/d (1.4 gm prot/Kg) based upon dosing wt 74.8Kg. Noted, renal indices appear elevated.  No GI distress of note. Per chart, last BM x5 on 2/22. Patient currently receiving propofol @ 6.7cc/hr per chart- appears to be trending down.   Of note, patient S/P multiple bedside swallow exams (2/14, 2/17) and attempted FEES (could not perform due to mental status)- recommended to remain NPO with nonoral sources of nutrition  See recommendations below    Current Weight: pre-HD weight 2/20 166.4 pounds; post-HD weight 166.2 pounds  Weight Change: Weight appears relatively stable since admit weight of 164.9 pounds    Pertinent Medications: MEDICATIONS  (STANDING):  aspirin  chewable 81 milliGRAM(s) Oral daily  dextrose 5%. 1000 milliLiter(s) (50 mL/Hr) IV Continuous <Continuous>  dextrose 50% Injectable 12.5 Gram(s) IV Push once  dextrose 50% Injectable 25 Gram(s) IV Push once  dextrose 50% Injectable 25 Gram(s) IV Push once  heparin  Injectable 5000 Unit(s) SubCutaneous every 8 hours  insulin glargine Injectable (LANTUS) 7.5 Unit(s) SubCutaneous at bedtime  insulin lispro (HumaLOG) corrective regimen sliding scale   SubCutaneous every 6 hours  norepinephrine Infusion 0.07 MICROgram(s)/kG/Min (4.909 mL/Hr) IV Continuous <Continuous>  nystatin Powder 1 Application(s) Topical two times a day  pantoprazole  Injectable 40 milliGRAM(s) IV Push daily  piperacillin/tazobactam IVPB. 3.375 Gram(s) IV Intermittent every 12 hours  propofol Infusion 1 MICROgram(s)/kG/Min (0.449 mL/Hr) IV Continuous <Continuous>  triamcinolone 0.1% Ointment 1 Application(s) Topical two times a day    MEDICATIONS  (PRN):  chlorhexidine 0.12% Liquid 15 milliLiter(s) Swish and Spit every 4 hours PRN mouth hygiene  dextrose Gel 1 Dose(s) Oral once PRN Blood Glucose LESS THAN 70 milliGRAM(s)/deciliter  glucagon  Injectable 1 milliGRAM(s) IntraMuscular once PRN Glucose LESS THAN 70 milligrams/deciliter    Pertinent Labs:  (2/22) POCT , BUN 60H, Cr 3.71H, BG 203H, Phos 5.4H; (2/21) Alk Phos 307H, POCT -251    Skin: no edema noted; multiple skin tears    Estimated Needs:   [x] no change since previous assessment  [ ] recalculated:       Previous Nutrition Diagnosis:     [x] Increased Protein Needs         Nutrition Diagnosis is [x] ongoing, plan to address with nutrition via tolerated route as feasible         New Nutrition Diagnosis: [x] not applicable        Recommend    1) If patient should require alternate means of nutrition support, recommend Nepro @ 40cc/hr x 24 hrs to provide 960cc fluid, 1728cal/d (23cal/Kg dosing wt), and 78gm prot/d (1.5gm prot/Kg IBW) based upon dosing wt 74.8Kg and IBW 52.3Kg. Recommend continuous 24-hour feeds to promote steady intake of CHOs and better BG control.   2) Continue to monitor propofol infusion and make adjustments to feeds prn       Monitoring and Evaluation:     [ ] PO intake [x] Tolerance to diet prescription [x] weights [x] follow up per protocol    [x] other: RD remains available, Beth Fish RD Pager #[phone]

## 2018-02-22 NOTE — PROGRESS NOTE ADULT - SUBJECTIVE AND OBJECTIVE BOX
CHIEF COMPLAINT: 67y old woman who presents with a chief complaint of tachypnea with voluminous secretion and respiratory distress    Interval Events: Overnight, patient febrile to 101; blood cultures drawn. Hyperkalemic to 5.8; given kayexalate. Potassium normalized after three bowel movements. Unable to assess ROS.       OBJECTIVE:  ICU Vital Signs Last 24 Hrs  T(C): 36.3 (22 Feb 2018 12:00), Max: 38.6 (21 Feb 2018 19:00)  T(F): 97.3 (22 Feb 2018 12:00), Max: 101.5 (21 Feb 2018 19:00)  HR: 108 (22 Feb 2018 13:10) (67 - 108)  BP: 160/70 (22 Feb 2018 13:10) (48/27 - 179/78)  BP(mean): 100 (22 Feb 2018 13:10) (33 - 112)  ABP: --  ABP(mean): --  RR: 22 (22 Feb 2018 13:10) (16 - 51)  SpO2: 100% (22 Feb 2018 13:10) (94% - 100%)    Mode: AC/ CMV (Assist Control/ Continuous Mandatory Ventilation), RR (machine): 14, TV (machine): 450, FiO2: 40, PEEP: 5, ITime: 1, MAP: 11, PIP: 28    02-21 @ 07:01 - 02-22 @ 07:00  --------------------------------------------------------  IN: 393.9 mL / OUT: 0 mL / NET: 393.9 mL    02-22 @ 07:01  - 02-22 @ 13:26  --------------------------------------------------------  IN: 169.3 mL / OUT: 0 mL / NET: 169.3 mL      CAPILLARY BLOOD GLUCOSE      POCT Blood Glucose.: 97 mg/dL (22 Feb 2018 11:43)      PHYSICAL EXAM:  General: NAD  HEENT: NC/AT; PERRL, clear conjunctiva  Neck: supple  Respiratory: CTA b/l  Cardiovascular: +S1/S2; RRR  Abdomen: soft, NT/ND; +BS x4  Extremities: WWP, 2+ peripheral pulses b/l; no LE edema  Skin: normal color and turgor; no rash  Neurological: sedated     HOSPITAL MEDICATIONS:  Standing Meds:  aspirin  chewable 81 milliGRAM(s) Oral daily  dextrose 5%. 1000 milliLiter(s) IV Continuous <Continuous>  dextrose 50% Injectable 12.5 Gram(s) IV Push once  dextrose 50% Injectable 25 Gram(s) IV Push once  dextrose 50% Injectable 25 Gram(s) IV Push once  heparin  Injectable 5000 Unit(s) SubCutaneous every 8 hours  insulin glargine Injectable (LANTUS) 7.5 Unit(s) SubCutaneous at bedtime  insulin lispro (HumaLOG) corrective regimen sliding scale   SubCutaneous every 6 hours  norepinephrine Infusion 0.07 MICROgram(s)/kG/Min IV Continuous <Continuous>  nystatin Powder 1 Application(s) Topical two times a day  pantoprazole  Injectable 40 milliGRAM(s) IV Push daily  piperacillin/tazobactam IVPB. 3.375 Gram(s) IV Intermittent every 12 hours  propofol Infusion 1 MICROgram(s)/kG/Min IV Continuous <Continuous>  triamcinolone 0.1% Ointment 1 Application(s) Topical two times a day      PRN Meds:  chlorhexidine 0.12% Liquid 15 milliLiter(s) Swish and Spit every 4 hours PRN  dextrose Gel 1 Dose(s) Oral once PRN  glucagon  Injectable 1 milliGRAM(s) IntraMuscular once PRN      LABS:                        8.2    11.5  )-----------( 203      ( 21 Feb 2018 22:55 )             24.6     Hgb Trend: 8.2<--, 9.2<--, 8.6<--, 7.1<--, 6.4<--  02-22    136  |  96  |  60<H>  ----------------------------<  203<H>  5.2   |  25  |  3.71<H>    Ca    8.4      22 Feb 2018 03:37  Phos  5.4     02-22  Mg     2.6     02-22    TPro  6.2  /  Alb  2.3<L>  /  TBili  0.5  /  DBili  x   /  AST  27  /  ALT  42  /  AlkPhos  307<H>  02-21    Creatinine Trend: 3.71<--, 3.35<--, 3.08<--, 2.85<--, 2.67<--, 2.99<--  PT/INR - ( 21 Feb 2018 22:55 )   PT: 10.8 sec;   INR: 1.00 ratio         PTT - ( 21 Feb 2018 22:55 )  PTT:24.9 sec    Arterial Blood Gas:  02-21 @ 15:11  7.34/52/100/27/99/1.5  ABG lactate: --  Arterial Blood Gas:  02-21 @ 11:15  7.29/67/123/31/99/4.0  ABG lactate: --        MICROBIOLOGY:     RADIOLOGY:  [ ] Reviewed and interpreted by me    EKG:

## 2018-02-22 NOTE — PROGRESS NOTE ADULT - PROBLEM SELECTOR PLAN 1
likely from ATN in setting of cardiac arrest. Pt continues to be oligo anuric. Continues to be dialysis dependant. Monitor for renal recovery.   Please remove non tunneled catheter/replace with perm HD catheter.  HD today with 1-2 L UF as tolerated.

## 2018-02-22 NOTE — PROGRESS NOTE ADULT - ASSESSMENT
ACute hypoxemic and hypercapneic resp failure: impending resp failure on BIPAP  SHock on NE: probably sepsis, undiagnosed  CXR c/w new RLL pneumonia     RUSS progressed to ESRD on HD  S/P CAC with rib fractures and bilateral pneumothoraces  CVA with corical and subcortical infarcts  s/p cardiac arrest    REC    COntinue antibioticvs and check sputum cultures  Hemodynamic assessment with VTI/LVOT calc of cardiac output   Taper catechols as tolerated  Deferr extubation  HD per renal

## 2018-02-22 NOTE — PROGRESS NOTE ADULT - SUBJECTIVE AND OBJECTIVE BOX
Chief Complaint: T2DM    History: Patient remained intubated and is NPO.   MEDICATIONS  (STANDING):  aspirin  chewable 81 milliGRAM(s) Oral daily  dextrose 5%. 1000 milliLiter(s) (50 mL/Hr) IV Continuous <Continuous>  dextrose 5%. 1000 milliLiter(s) (50 mL/Hr) IV Continuous <Continuous>  dextrose 5%. 1000 milliLiter(s) (50 mL/Hr) IV Continuous <Continuous>  dextrose 50% Injectable 12.5 Gram(s) IV Push once  dextrose 50% Injectable 25 Gram(s) IV Push once  dextrose 50% Injectable 25 Gram(s) IV Push once  heparin  Injectable 5000 Unit(s) SubCutaneous every 8 hours  insulin glargine Injectable (LANTUS) 7 Unit(s) SubCutaneous at bedtime  insulin lispro (HumaLOG) corrective regimen sliding scale   SubCutaneous every 6 hours  norepinephrine Infusion 0.07 MICROgram(s)/kG/Min (4.909 mL/Hr) IV Continuous <Continuous>  nystatin Powder 1 Application(s) Topical two times a day  pantoprazole  Injectable 40 milliGRAM(s) IV Push daily  piperacillin/tazobactam IVPB. 3.375 Gram(s) IV Intermittent every 12 hours  propofol Infusion 1 MICROgram(s)/kG/Min (0.449 mL/Hr) IV Continuous <Continuous>  triamcinolone 0.1% Ointment 1 Application(s) Topical two times a day    MEDICATIONS  (PRN):  chlorhexidine 0.12% Liquid 15 milliLiter(s) Swish and Spit every 4 hours PRN mouth hygiene  dextrose Gel 1 Dose(s) Oral once PRN Blood Glucose LESS THAN 70 milliGRAM(s)/deciliter  glucagon  Injectable 1 milliGRAM(s) IntraMuscular once PRN Glucose LESS THAN 70 milligrams/deciliter      Allergies    doxycycline (Rash)  isoniazid (Rash)  NIFEdipine (Urticaria; Hives)  vitamin E (Short breath; Urticaria; Hives)    Intolerances      Review of Systems:    UNABLE TO OBTAIN    PHYSICAL EXAM:  VITALS: T(C): 38.3 (02-22-18 @ 20:00)  T(F): 100.9 (02-22-18 @ 20:00), Max: 100.9 (02-22-18 @ 20:00)  HR: 76 (02-22-18 @ 21:45) (67 - 108)  BP: 107/52 (02-22-18 @ 21:45) (48/27 - 179/78)  RR:  (16 - 42)  SpO2:  (94% - 100%)  Wt(kg): --  GENERAL: NAD, well-groomed, well-developed  THYROID: Normal size, no palpable nodules  RESPIRATORY: Intubated, respiratory breath sounds.   CARDIOVASCULAR: Regular rate and rhythm; No murmurs; no peripheral edema  GI: Soft, nontender, non distended, normal bowel sounds  NEURO: sedated  PSYCH: Alert and oriented x 3, normal affect, normal mood    POCT Blood Glucose.: 168 mg/dL (02-22-18 @ 21:40)  POCT Blood Glucose.: 141 mg/dL (02-22-18 @ 17:40)  POCT Blood Glucose.: 97 mg/dL (02-22-18 @ 11:43)  POCT Blood Glucose.: 191 mg/dL (02-22-18 @ 05:38)  POCT Blood Glucose.: 167 mg/dL (02-21-18 @ 17:10)  POCT Blood Glucose.: 140 mg/dL (02-21-18 @ 11:57)  POCT Blood Glucose.: 135 mg/dL (02-21-18 @ 11:06)  POCT Blood Glucose.: 251 mg/dL (02-21-18 @ 05:37)  POCT Blood Glucose.: 285 mg/dL (02-20-18 @ 23:32)  POCT Blood Glucose.: 208 mg/dL (02-20-18 @ 18:38)  POCT Blood Glucose.: 258 mg/dL (02-20-18 @ 16:30)  POCT Blood Glucose.: 181 mg/dL (02-20-18 @ 12:09)  POCT Blood Glucose.: 262 mg/dL (02-20-18 @ 06:03)  POCT Blood Glucose.: 337 mg/dL (02-20-18 @ 01:02)      02-22    136  |  96  |  60<H>  ----------------------------<  203<H>  5.2   |  25  |  3.71<H>    EGFR if : 14<L>  EGFR if non : 12<L>    Ca    8.4      02-22  Mg     2.6     02-22  Phos  5.4     02-22    TPro  6.2  /  Alb  2.3<L>  /  TBili  0.5  /  DBili  x   /  AST  27  /  ALT  42  /  AlkPhos  307<H>  02-21          Thyroid Function Tests:      Hemoglobin A1C, Whole Blood: 8.6 % <H> [4.0 - 5.6] (01-31-18 @ 07:15)  Hemoglobin A1C, Whole Blood: 8.7 % <H> [4.0 - 5.6] (01-31-18 @ 05:51)

## 2018-02-22 NOTE — PROGRESS NOTE ADULT - ASSESSMENT
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     # Neuro:  - Currently sedated on propofol. Known watershed ischemia of the occiput from low flow state during admission 2/2 flu  - Low suspicion for repeat CVA event, no need for CT or MR of the head as of now    # CV:  - Patient currently requiring Levo to maintain MAP > 65, suspect mix of venodilation from propofol with beginnings of vasoplegia 2/2 aspiration PNA and chronic illness  - No EF derangements noted on POCUS    # Pulm:  - Patient currently intubated for hypoxemic respiratory failure which is likely 2/2 aspiration PNA  - Titrate O2 down as tolerated to prevent hyperoxia  - No dialyzable fluid visualized on US  - PNA treatment as below    # Renal:  - Patient with Triley in place for HD.  - Patient dialyzed today per Renal  - appreciate renal recommendations    # ID:  - Vanc by level and renally dosed Zosyn for empiric Aspiration PNA in the context of immunocompromised state and prolonged hospitalization with poor functional status  - Plan for 5-7 days, currently day 2  - Vanc trough post HD  - F/U blood cx.    # GI:  -start feeds via NG, Nepro @ 40cc/hr x 24 hrs to provide 960cc fluids recommended by nutrition  - GI ppx with protonix    # Endocrine:  - Restarting feeds  - Continue with 7.5U Lantus and FS q6h with SSI.    # Heme:  - Will continue to trend H/H  - Currently holding Herceptin    # Skin:  - No sacral decubiti or signs of infection  - Patient with prolonged Triley in RIJ. 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     # Neuro:  - Currently sedated on propofol. Known watershed ischemia of the occiput from low flow state during admission 2/2 flu  - Low suspicion for repeat CVA event, no need for CT or MR of the head as of now    # CV:  - Patient currently requiring Levo to maintain MAP > 65, suspect mix of venodilation from propofol with beginnings of vasoplegia 2/2 aspiration PNA and chronic illness  - No EF derangements noted on POCUS    # Pulm:  - Patient currently intubated for hypoxemic respiratory failure which is likely 2/2 aspiration PNA  - Titrate O2 down as tolerated to prevent hyperoxia  - No dialyzable fluid visualized on US  - PNA treatment as below    # Renal:  - Patient with Triley in place for HD.  - Patient dialyzed today per Renal  - appreciate renal recommendations    # ID:  - Vanc by level and renally dosed Zosyn for empiric Aspiration PNA in the context of immunocompromised state and prolonged hospitalization with poor functional status  - Plan for 5-7 days, currently day 2  - Vanc trough post HD  - F/U blood cx.    # GI:  -start feeds via NG, Nepro @ 40cc/hr x 24 hrs to provide 960cc fluids recommended by nutrition  - GI ppx with protonix    # Endocrine:  - Restarting feeds  - Continue with 7.5U Lantus and FS q6h with SSI.    # Heme:  - Will continue to trend H/H  - Currently holding Herceptin    # Skin:  - No sacral decubiti or signs of infection  - Patient with prolonged Triley in RIJ. Will plan for permacath so as to expedite CVC removal pending IR approval    # DVT ppx:  - HSC 5000 units Q8 hours      Please refer to resident note for definite plan.  Yaima Munroe (MS4)

## 2018-02-22 NOTE — CHART NOTE - NSCHARTNOTEFT_GEN_A_CORE
: Rakesh Kim MS4, Dr. Steiner    INDICATION: POCUS    PROCEDURE:  [X] LIMITED ECHO  [X] LIMITED CHEST  [ ] LIMITED RETROPERITONEAL  [ ] LIMITED ABDOMINAL  [ ] LIMITED DVT  [ ] NEEDLE GUIDANCE VASCULAR  [ ] NEEDLE GUIDANCE THORACENTESIS  [ ] NEEDLE GUIDANCE PARACENTESIS  [ ] NEEDLE GUIDANCE PERICARDIOCENTESIS  [ ] OTHER    FINDINGS:    Chest: A-lines in the anterior right lung field, diffuse B-lines in the lateral and posterior right lung fields. Focal consolidations at the left lung base.     Echo: VTI ~ 18.5 cm.     INTERPRETATION:    Chest: B-lines in the right lung, focal consolidations at left lung base. Limited study.    Echo: VTI ~ 18.5 cm : Rakesh Kim MS4, Dr. Steiner    INDICATION: POCUS    PROCEDURE:  [X] LIMITED ECHO  [X] LIMITED CHEST  [ ] LIMITED RETROPERITONEAL  [ ] LIMITED ABDOMINAL  [ ] LIMITED DVT  [ ] NEEDLE GUIDANCE VASCULAR  [ ] NEEDLE GUIDANCE THORACENTESIS  [ ] NEEDLE GUIDANCE PARACENTESIS  [ ] NEEDLE GUIDANCE PERICARDIOCENTESIS  [ ] OTHER    FINDINGS:    Chest: A-lines in the anterior right lung field, diffuse B-lines in the lateral and posterior right lung fields. Focal consolidations at the left lung base.     Echo: VTI ~ 18.5 cm.     INTERPRETATION:    Chest: B-lines in the right lung, focal consolidations at left lung base. Limited study.    Echo: VTI ~ 18.5 cm    With Jatin TITUS

## 2018-02-22 NOTE — PROGRESS NOTE ADULT - SUBJECTIVE AND OBJECTIVE BOX
Follow-up Pulm Progress Note  Caleb Mon MD  [phone]    Events noted  Transferred to MICU after intubation with soluminous purulent secretions from ET tube and new dense R basislar opacity c/w aspiration pneumonia and secondary resp failure  CUrrently sedated on CMV 40% FIO2  On Norepinephrine for hypotenison  Afebrile on Zosyn    Vital Signs Last 24 Hrs  T(C): 37.1 (20 Feb 2018 05:28), Max: 37.1 (20 Feb 2018 05:28)  T(F): 98.7 (20 Feb 2018 05:28), Max: 98.7 (20 Feb 2018 05:28)  HR: 90 (20 Feb 2018 09:23) (78 - 90)  BP: 148/63 (20 Feb 2018 05:28) (101/44 - 148/63)  BP(mean): --  RR: 18 (20 Feb 2018 05:28) (17 - 18)  SpO2: 96% (20 Feb 2018 09:23) (96% - 100%)                       6.4    7.28  )-----------( 183      ( 20 Feb 2018 07:22 )             21.2       02-20    135  |  94<L>  |  51<H>  ----------------------------<  296<H>  5.0   |  32<H>  |  3.02<H>    Ca    8.7      20 Feb 2018 07:21        CULTURES:    Physical Examination:  PULM: OVert paradox on BIPAP; no wheeze, decr esc  CVS: Regular rate and rhythm, no murmurs, rubs, or gallops  ABD: Soft, non-tender  EXT:  No clubbing, cyanosis, or edema    RADIOLOGY REVIEWED  CXR: see above    CT chest:    TTE:

## 2018-02-22 NOTE — PROGRESS NOTE ADULT - ASSESSMENT
67F PMH HTN, DMT2, Breast Cancer (diagnosed in Feb 2017) currently on trastuzumab (last dose Jan 20th), originally presented 1/30 with fever found to have influenza subsequently went into PEA arrest x 2 with ROSC, complicated by bilateral pneumothoraces with pneumomediastinum s/p 3 chest tubes, and also new ESRD requiring almost daily HD. readmitted to the MICU for hypoxic respiratory failure likely 2/2 aspiration.    # Neuro:  - Currently sedated on propofol. Known watershed ischemia of the occiput from low flow state during admission 2/2 flu  - Low suspicion for repeat CVA event, no need for CT or MR of the head as of now    # CV:  - Patient currently requiring Levo to maintain MAP > 65, suspect mix of venodilation from propofol with beginnings of vasoplegia 2/2 aspiration PNA and chronic illness  - No EF derangements noted on POCUS    # Pulm:  - Patient currently intubated for hypoxemic respiratory failure which is likely 2/2 aspiration PNA  - Titrate O2 down as tolerated to prevent hyperoxia  - No dialyzable fluid visualized on US  - PNA treatment as below    # Renal:  - Patient with Triley in place for HD.  - Patient to be dialyzed today per Renal  - appreciate renal recommendations    # ID: concern for aspiration PNA  - Vanc by level and renally dosed Zosyn for empiric Aspiration PNA in the context of immunocompromised state and prolonged hospitalization with poor functional status  - Plan for 5-7 days, currently day 2  - Vanc trough post HD  - F/U blood cx.    # GI:  -start feeds via NG, Nepro @ 40cc/hr x 24 hrs to provide 960cc fluids recommended by nutrition  - GI ppx with protonix    # Endocrine:  - Restarting feeds  - Continue with 7U Lantus and FS q6h with low SSI.    # Heme:  - Will continue to trend H/H  - Currently holding Herceptin    # Skin:  - No sacral decubiti or signs of infection  - Patient with prolonged Triley in RIJ. Will plan for permacath so as to expedite CVC removal pending IR approval    # DVT ppx:  - HSC 5000 units Q8 hours 67F PMH HTN, DMT2, Breast Cancer (diagnosed in Feb 2017) currently on trastuzumab (last dose Jan 20th), originally presented 1/30 with fever found to have influenza subsequently went into PEA arrest x 2 with ROSC, complicated by bilateral pneumothoraces with pneumomediastinum s/p 3 chest tubes, and also new ESRD requiring almost daily HD. readmitted to the MICU for hypoxic respiratory failure likely 2/2 aspiration.    # Neuro:  - Currently sedated on propofol. Known watershed ischemia of the occiput from low flow state during admission 2/2 flu  - Low suspicion for repeat CVA event, no need for CT or MR of the head as of now    # CV:  - Patient currently requiring Levo to maintain MAP > 65, suspect mix of venodilation from propofol with beginnings of vasoplegia 2/2 aspiration PNA and chronic illness  - No EF derangements noted on POCUS  - Hemodynamic Analysis: patient on low dose norepi. picture consistent with vasoplegic shock pattern in otherwise normal echo findings. DO2 moderately reduced but appears adequate for physiological function.   SV 52, HR 74, CO 3.8L/min   CaO2 11.4/100cc  DO2 186miL2/min; 6.2mlO2/kg/min    # Pulm:  - Patient currently intubated for hypoxemic respiratory failure which is likely 2/2 aspiration PNA  - Titrate O2 down as tolerated to prevent hyperoxia  - No dialyzable fluid visualized on US  - PNA treatment as below    # Renal:  - Patient with Triley in place for HD.  - Patient to be dialyzed today per Renal  - appreciate renal recommendations    # ID: concern for aspiration PNA  - Vanc by level and renally dosed Zosyn for empiric Aspiration PNA in the context of immunocompromised state and prolonged hospitalization with poor functional status  - Plan for 5-7 days, currently day 2  - Vanc trough post HD  - F/U blood cx.    # GI:  -start feeds via NG, Nepro @ 40cc/hr x 24 hrs to provide 960cc fluids recommended by nutrition  - GI ppx with protonix    # Endocrine:  - Restarting feeds  - Continue with 7U Lantus and FS q6h with low SSI.    # Heme:  - Will continue to trend H/H  - Currently holding Herceptin    # Skin:  - No sacral decubiti or signs of infection  - Patient with prolonged Triley in RIJ. Will plan for permacath so as to expedite CVC removal pending IR approval    # DVT ppx:  - HSC 5000 units Q8 hours

## 2018-02-22 NOTE — PROGRESS NOTE ADULT - ATTENDING COMMENTS
Critically ill on vent/pressor with aspiration PNA  Frequent bedside visits with therapy change today. Crit Care Time Today 35 min +

## 2018-02-22 NOTE — PROGRESS NOTE ADULT - ASSESSMENT
68 y/o F with T2DM uncontrolled on insulin, osteoporosis, HTN, here with acute respiratory failure 2/2 influenza s/p PEA arrest x 2, awiting fiberoptic swallow eval.

## 2018-02-22 NOTE — PROGRESS NOTE ADULT - SUBJECTIVE AND OBJECTIVE BOX
[facility] DIVISION OF KIDNEY DISEASES AND HYPERTENSION -- FOLLOW UP NOTE  --------------------------------------------------------------------------------  Chief Complaint:    24 hour events/subjective:        PAST HISTORY  --------------------------------------------------------------------------------  No significant changes to PMH, PSH, FHx, SHx, unless otherwise noted    ALLERGIES & MEDICATIONS  --------------------------------------------------------------------------------  Allergies    doxycycline (Rash)  isoniazid (Rash)  NIFEdipine (Urticaria; Hives)  vitamin E (Short breath; Urticaria; Hives)    Intolerances      Standing Inpatient Medications  aspirin  chewable 81 milliGRAM(s) Oral daily  dextrose 5%. 1000 milliLiter(s) IV Continuous <Continuous>  dextrose 50% Injectable 12.5 Gram(s) IV Push once  dextrose 50% Injectable 25 Gram(s) IV Push once  dextrose 50% Injectable 25 Gram(s) IV Push once  heparin  Injectable 5000 Unit(s) SubCutaneous every 8 hours  insulin glargine Injectable (LANTUS) 7.5 Unit(s) SubCutaneous at bedtime  insulin lispro (HumaLOG) corrective regimen sliding scale   SubCutaneous every 6 hours  norepinephrine Infusion 0.07 MICROgram(s)/kG/Min IV Continuous <Continuous>  nystatin Powder 1 Application(s) Topical two times a day  pantoprazole  Injectable 40 milliGRAM(s) IV Push daily  piperacillin/tazobactam IVPB. 3.375 Gram(s) IV Intermittent every 12 hours  propofol Infusion 1 MICROgram(s)/kG/Min IV Continuous <Continuous>  triamcinolone 0.1% Ointment 1 Application(s) Topical two times a day    PRN Inpatient Medications  chlorhexidine 0.12% Liquid 15 milliLiter(s) Swish and Spit every 4 hours PRN  dextrose Gel 1 Dose(s) Oral once PRN  glucagon  Injectable 1 milliGRAM(s) IntraMuscular once PRN      REVIEW OF SYSTEMS  --------------------------------------------------------------------------------  Gen: No weight changes, fatigue, fevers/chills, weakness  Skin: No rashes  Head/Eyes/Ears/Mouth: No headache; Normal hearing; Normal vision w/o blurriness; No sinus pain/discomfort, sore throat  Respiratory: No dyspnea, cough, wheezing, hemoptysis  CV: No chest pain, PND, orthopnea  GI: No abdominal pain, diarrhea, constipation, nausea, vomiting, melena, hematochezia  : No increased frequency, dysuria, hematuria, nocturia  MSK: No joint pain/swelling; no back pain; no edema  Neuro: No dizziness/lightheadedness, weakness, seizures, numbness, tingling  Heme: No easy bruising or bleeding  Endo: No heat/cold intolerance  Psych: No significant nervousness, anxiety, stress, depression    All other systems were reviewed and are negative, except as noted.    VITALS/PHYSICAL EXAM  --------------------------------------------------------------------------------  T(C): 36.2 (02-22-18 @ 08:00), Max: 38.6 (02-21-18 @ 19:00)  HR: 86 (02-22-18 @ 08:30) (67 - 90)  BP: 86/42 (02-22-18 @ 08:30) (84/47 - 174/74)  RR: 28 (02-22-18 @ 08:30) (16 - 51)  SpO2: 100% (02-22-18 @ 08:30) (90% - 100%)  Wt(kg): --        02-21-18 @ 07:01  -  02-22-18 @ 07:00  --------------------------------------------------------  IN: 393.9 mL / OUT: 0 mL / NET: 393.9 mL    02-22-18 @ 07:01  -  02-22-18 @ 09:04  --------------------------------------------------------  IN: 34.5 mL / OUT: 0 mL / NET: 34.5 mL      Physical Exam:  	Gen: NAD, well-appearing  	HEENT: PERRL, supple neck, clear oropharynx  	Pulm: CTA B/L  	CV: RRR, S1S2; no rub  	Back: No spinal or CVA tenderness; no sacral edema  	Abd: +BS, soft, nontender/nondistended  	: No suprapubic tenderness  	UE: Warm, FROM, no clubbing, intact strength; no edema; no asterixis  	LE: Warm, FROM, no clubbing, intact strength; no edema  	Neuro: No focal deficits, intact gait  	Psych: Normal affect and mood  	Skin: Warm, without rashes  	Vascular access:    LABS/STUDIES  --------------------------------------------------------------------------------              8.2    11.5  >-----------<  203      [02-21-18 @ 22:55]              24.6     136  |  96  |  60  ----------------------------<  203      [02-22-18 @ 03:37]  5.2   |  25  |  3.71        Ca     8.4     [02-22-18 @ 03:37]      Mg     2.6     [02-22-18 @ 03:37]      Phos  5.4     [02-22-18 @ 03:37]    TPro  6.2  /  Alb  2.3  /  TBili  0.5  /  DBili  x   /  AST  27  /  ALT  42  /  AlkPhos  307  [02-21-18 @ 22:55]    PT/INR: PT 10.8 , INR 1.00       [02-21-18 @ 22:55]  PTT: 24.9       [02-21-18 @ 22:55]      Creatinine Trend:  SCr 3.71 [02-22 @ 03:37]  SCr 3.35 [02-21 @ 22:55]  SCr 3.08 [02-21 @ 16:35]  SCr 2.85 [02-21 @ 15:21]  SCr 2.67 [02-21 @ 11:52]    Urinalysis - [01-30-18 @ 12:52]      Color Yellow / Appearance SL Turbid / SG 1.025 / pH 6.0      Gluc 50 / Ketone Negative  / Bili Negative / Urobili Negative       Blood Trace / Protein 150 / Leuk Est Negative / Nitrite Negative      RBC 3-5 / WBC  / Hyaline  / Gran  / Sq Epi  / Non Sq Epi OCC / Bacteria       HbA1c 8.6      [01-31-18 @ 07:15]  Lipid: chol 183, , HDL 10,       [02-07-18 @ 13:34]    HBsAg Nonreact      [02-01-18 @ 22:49]  HCV 0.16, Nonreact      [02-01-18 @ 22:49]

## 2018-02-22 NOTE — PROGRESS NOTE ADULT - ATTENDING COMMENTS
Remains intubated.  Less secretions suctioned compared to post respiratory arrest presumed aspiration  1.  ARF--HD dependent  2.  Respiratory failure acute--FIO2<0.6.  No role for aggressive UF at present

## 2018-02-22 NOTE — PROGRESS NOTE ADULT - SUBJECTIVE AND OBJECTIVE BOX
CHIEF COMPLAINT:  67y old woman who presents with a chief complaint of tachypnea with voluminous secretion and respiratory distress    INTERVAL HPI/OVERNIGHT EVENTS: ON pt had fever, blood culture were drawn. Pt treated with kayexalate and albuterol for hyperkalemia.    SUBJECTIVE: Patient seen and examined at bedside.  ROS: unable to assess ROS due to nonverbal status of patient    OBJECTIVE:    VITAL SIGNS:  ICU Vital Signs Last 24 Hrs  T(C): 36.2 (22 Feb 2018 08:00), Max: 38.6 (21 Feb 2018 19:00)  T(F): 97.2 (22 Feb 2018 08:00), Max: 101.5 (21 Feb 2018 19:00)  HR: 73 (22 Feb 2018 11:37) (67 - 90)  BP: 121/57 (22 Feb 2018 11:00) (84/47 - 179/78)  BP(mean): 82 (22 Feb 2018 11:00) (58 - 112)  RR: 25 (22 Feb 2018 11:00) (16 - 51)  SpO2: 100% (22 Feb 2018 11:37) (90% - 100%)    Mode: AC/ CMV (Assist Control/ Continuous Mandatory Ventilation), RR (machine): 14, TV (machine): 450, FiO2: 40, PEEP: 5, ITime: 1, MAP: 11, PIP: 28    02-21 @ 07:01  -  02-22 @ 07:00  --------------------------------------------------------  IN: 393.9 mL / OUT: 0 mL / NET: 393.9 mL    02-22 @ 07:01  -  02-22 @ 12:04  --------------------------------------------------------  IN: 34.5 mL / OUT: 0 mL / NET: 34.5 mL    POCT Blood Glucose.: 191 mg/dL (22 Feb 2018 05:38)      PHYSICAL EXAM:    General: NAD  HEENT: NC/AT; PERRL, clear conjunctiva  Neck: supple  Respiratory: CTA b/l  Cardiovascular: +S1/S2; RRR  Abdomen: soft, NT/ND; +BS x4  Extremities: WWP, 2+ peripheral pulses b/l; no LE edema  Skin: normal color and turgor; no rash  Neurological:    MEDICATIONS:  MEDICATIONS  (STANDING):  aspirin  chewable 81 milliGRAM(s) Oral daily  heparin  Injectable 5000 Unit(s) SubCutaneous every 8 hours  insulin glargine Injectable (LANTUS) 7.5 Unit(s) SubCutaneous at bedtime  insulin lispro (HumaLOG) corrective regimen sliding scale   SubCutaneous every 6 hours  norepinephrine Infusion 0.07 MICROgram(s)/kG/Min (4.909 mL/Hr) IV Continuous <Continuous>  nystatin Powder 1 Application(s) Topical two times a day  pantoprazole  Injectable 40 milliGRAM(s) IV Push daily  piperacillin/tazobactam IVPB. 3.375 Gram(s) IV Intermittent every 12 hours  propofol Infusion 1 MICROgram(s)/kG/Min (0.449 mL/Hr) IV Continuous <Continuous>  triamcinolone 0.1% Ointment 1 Application(s) Topical two times a day    MEDICATIONS  (PRN):  chlorhexidine 0.12% Liquid 15 milliLiter(s) Swish and Spit every 4 hours PRN mouth hygiene  dextrose Gel 1 Dose(s) Oral once PRN Blood Glucose LESS THAN 70 milliGRAM(s)/deciliter  glucagon  Injectable 1 milliGRAM(s) IntraMuscular once PRN Glucose LESS THAN 70 milligrams/deciliter    ALLERGIES:  doxycycline (Rash)  isoniazid (Rash)  NIFEdipine (Urticaria; Hives)  vitamin E (Short breath; Urticaria; Hives)    LABS:                        8.2    11.5  )-----------( 203      ( 21 Feb 2018 22:55 )             24.6     02-22    136  |  96  |  60<H>  ----------------------------<  203<H>  5.2   |  25  |  3.71<H>    Ca    8.4      22 Feb 2018 03:37  Phos  5.4     02-22  Mg     2.6     02-22    TPro  6.2  /  Alb  2.3<L>  /  TBili  0.5  /  DBili  x   /  AST  27  /  ALT  42  /  AlkPhos  307<H>  02-21    PT/INR - ( 21 Feb 2018 22:55 )   PT: 10.8 sec;   INR: 1.00 ratio      PTT - ( 21 Feb 2018 22:55 )  PTT:24.9 sec    < from: Xray Chest 1 View- PORTABLE-Urgent (02.21.18 @ 16:27) >  Impression:    The heart isslightly enlarged. Right lower lobe pneumonia. The left lung   is clear. Endotracheal tube is in good position. All other life   supporting devices are in good position and unchanged when compared to   previous study done February 21, 2018 at 11:17 AM.    < end of copied text >

## 2018-02-23 LAB
ALBUMIN SERPL ELPH-MCNC: 2.2 G/DL — LOW (ref 3.3–5)
ALP SERPL-CCNC: 313 U/L — HIGH (ref 40–120)
ALT FLD-CCNC: 61 U/L RC — HIGH (ref 10–45)
ANION GAP SERPL CALC-SCNC: 11 MMOL/L — SIGNIFICANT CHANGE UP (ref 5–17)
APTT BLD: 33.1 SEC — SIGNIFICANT CHANGE UP (ref 27.5–37.4)
AST SERPL-CCNC: 35 U/L — SIGNIFICANT CHANGE UP (ref 10–40)
BILIRUB SERPL-MCNC: 0.4 MG/DL — SIGNIFICANT CHANGE UP (ref 0.2–1.2)
BUN SERPL-MCNC: 32 MG/DL — HIGH (ref 7–23)
CALCIUM SERPL-MCNC: 8.2 MG/DL — LOW (ref 8.4–10.5)
CHLORIDE SERPL-SCNC: 97 MMOL/L — SIGNIFICANT CHANGE UP (ref 96–108)
CO2 SERPL-SCNC: 28 MMOL/L — SIGNIFICANT CHANGE UP (ref 22–31)
CREAT SERPL-MCNC: 2.57 MG/DL — HIGH (ref 0.5–1.3)
GAS PNL BLDA: SIGNIFICANT CHANGE UP
GAS PNL BLDA: SIGNIFICANT CHANGE UP
GLUCOSE BLDC GLUCOMTR-MCNC: 160 MG/DL — HIGH (ref 70–99)
GLUCOSE BLDC GLUCOMTR-MCNC: 161 MG/DL — HIGH (ref 70–99)
GLUCOSE BLDC GLUCOMTR-MCNC: 199 MG/DL — HIGH (ref 70–99)
GLUCOSE BLDC GLUCOMTR-MCNC: 234 MG/DL — HIGH (ref 70–99)
GLUCOSE SERPL-MCNC: 175 MG/DL — HIGH (ref 70–99)
GRAM STN FLD: SIGNIFICANT CHANGE UP
INR BLD: 1.06 RATIO — SIGNIFICANT CHANGE UP (ref 0.88–1.16)
MAGNESIUM SERPL-MCNC: 2.2 MG/DL — SIGNIFICANT CHANGE UP (ref 1.6–2.6)
METHOD TYPE: SIGNIFICANT CHANGE UP
MSSA DNA SPEC QL NAA+PROBE: SIGNIFICANT CHANGE UP
PHOSPHATE SERPL-MCNC: 4.7 MG/DL — HIGH (ref 2.5–4.5)
POTASSIUM SERPL-MCNC: 4 MMOL/L — SIGNIFICANT CHANGE UP (ref 3.5–5.3)
POTASSIUM SERPL-SCNC: 4 MMOL/L — SIGNIFICANT CHANGE UP (ref 3.5–5.3)
PROT SERPL-MCNC: 6.7 G/DL — SIGNIFICANT CHANGE UP (ref 6–8.3)
PROTHROM AB SERPL-ACNC: 11.6 SEC — SIGNIFICANT CHANGE UP (ref 9.8–12.7)
SODIUM SERPL-SCNC: 136 MMOL/L — SIGNIFICANT CHANGE UP (ref 135–145)
SPECIMEN SOURCE: SIGNIFICANT CHANGE UP

## 2018-02-23 PROCEDURE — 93308 TTE F-UP OR LMTD: CPT | Mod: 26

## 2018-02-23 PROCEDURE — 99232 SBSQ HOSP IP/OBS MODERATE 35: CPT

## 2018-02-23 PROCEDURE — 76604 US EXAM CHEST: CPT | Mod: 26

## 2018-02-23 PROCEDURE — 99233 SBSQ HOSP IP/OBS HIGH 50: CPT | Mod: GC

## 2018-02-23 PROCEDURE — 99291 CRITICAL CARE FIRST HOUR: CPT | Mod: 25

## 2018-02-23 RX ORDER — HYDROCORTISONE 1 %
1 OINTMENT (GRAM) TOPICAL EVERY 8 HOURS
Qty: 0 | Refills: 0 | Status: DISCONTINUED | OUTPATIENT
Start: 2018-02-23 | End: 2018-03-30

## 2018-02-23 RX ORDER — VANCOMYCIN HCL 1 G
1250 VIAL (EA) INTRAVENOUS ONCE
Qty: 0 | Refills: 0 | Status: COMPLETED | OUTPATIENT
Start: 2018-02-23 | End: 2018-02-23

## 2018-02-23 RX ORDER — ERYTHROPOIETIN 10000 [IU]/ML
4000 INJECTION, SOLUTION INTRAVENOUS; SUBCUTANEOUS
Qty: 0 | Refills: 0 | Status: DISCONTINUED | OUTPATIENT
Start: 2018-02-24 | End: 2018-03-04

## 2018-02-23 RX ORDER — INSULIN GLARGINE 100 [IU]/ML
12 INJECTION, SOLUTION SUBCUTANEOUS AT BEDTIME
Qty: 0 | Refills: 0 | Status: DISCONTINUED | OUTPATIENT
Start: 2018-02-23 | End: 2018-02-24

## 2018-02-23 RX ORDER — DIPHENHYDRAMINE HCL 50 MG
25 CAPSULE ORAL ONCE
Qty: 0 | Refills: 0 | Status: COMPLETED | OUTPATIENT
Start: 2018-02-23 | End: 2018-02-23

## 2018-02-23 RX ORDER — ACETAMINOPHEN 500 MG
650 TABLET ORAL ONCE
Qty: 0 | Refills: 0 | Status: COMPLETED | OUTPATIENT
Start: 2018-02-23 | End: 2018-02-23

## 2018-02-23 RX ADMIN — Medication 1 APPLICATION(S): at 17:50

## 2018-02-23 RX ADMIN — NYSTATIN CREAM 1 APPLICATION(S): 100000 CREAM TOPICAL at 06:26

## 2018-02-23 RX ADMIN — Medication 1 APPLICATION(S): at 06:26

## 2018-02-23 RX ADMIN — Medication 1: at 12:50

## 2018-02-23 RX ADMIN — Medication 25 MILLIGRAM(S): at 19:00

## 2018-02-23 RX ADMIN — PROPOFOL 0.45 MICROGRAM(S)/KG/MIN: 10 INJECTION, EMULSION INTRAVENOUS at 06:25

## 2018-02-23 RX ADMIN — Medication 1: at 17:50

## 2018-02-23 RX ADMIN — PANTOPRAZOLE SODIUM 40 MILLIGRAM(S): 20 TABLET, DELAYED RELEASE ORAL at 12:50

## 2018-02-23 RX ADMIN — HEPARIN SODIUM 5000 UNIT(S): 5000 INJECTION INTRAVENOUS; SUBCUTANEOUS at 06:25

## 2018-02-23 RX ADMIN — NYSTATIN CREAM 1 APPLICATION(S): 100000 CREAM TOPICAL at 17:50

## 2018-02-23 RX ADMIN — CHLORHEXIDINE GLUCONATE 15 MILLILITER(S): 213 SOLUTION TOPICAL at 06:25

## 2018-02-23 RX ADMIN — Medication 650 MILLIGRAM(S): at 06:26

## 2018-02-23 RX ADMIN — PIPERACILLIN AND TAZOBACTAM 25 GRAM(S): 4; .5 INJECTION, POWDER, LYOPHILIZED, FOR SOLUTION INTRAVENOUS at 17:50

## 2018-02-23 RX ADMIN — HEPARIN SODIUM 5000 UNIT(S): 5000 INJECTION INTRAVENOUS; SUBCUTANEOUS at 14:25

## 2018-02-23 RX ADMIN — HEPARIN SODIUM 5000 UNIT(S): 5000 INJECTION INTRAVENOUS; SUBCUTANEOUS at 21:57

## 2018-02-23 RX ADMIN — Medication 1: at 23:43

## 2018-02-23 RX ADMIN — Medication 81 MILLIGRAM(S): at 12:50

## 2018-02-23 RX ADMIN — PIPERACILLIN AND TAZOBACTAM 25 GRAM(S): 4; .5 INJECTION, POWDER, LYOPHILIZED, FOR SOLUTION INTRAVENOUS at 06:25

## 2018-02-23 RX ADMIN — Medication 2: at 06:48

## 2018-02-23 RX ADMIN — INSULIN GLARGINE 12 UNIT(S): 100 INJECTION, SOLUTION SUBCUTANEOUS at 23:43

## 2018-02-23 RX ADMIN — Medication 166.67 MILLIGRAM(S): at 02:17

## 2018-02-23 NOTE — PROGRESS NOTE ADULT - ASSESSMENT
67F PMH HTN, DMT2, Breast Cancer (diagnosed in Feb 2017) currently on trastuzumab (last dose Jan 20th), originally presented 1/30 with fever found to have influenza subsequently went into PEA arrest x 2 with ROSC, complicated by bilateral pneumothoraces with pneumomediastinum s/p 3 chest tubes, and also new ESRD requiring almost daily HD. readmitted to the MICU for hypoxic respiratory failure likely 2/2 aspiration.    # Neuro:  - Pt currently arousable off propofol.   - Known watershed ischemia of the occiput from low flow state during admission 2/2 flu  - Low suspicion for repeat CVA event, no need for CT or MR of the head as of now    # CV:  - Patient currently requiring low levels of Levo to maintain MAP > 65, suspect vasoplegia 2/2 aspiration PNA and chronic illness and possible improvement with discontinuation of venodilation 2/2 propofol.  - No EF derangements noted on POCUS with VTI measurement at 20.2 cm suggestive of normal range SV  - Hemodynamic Analysis: patient on low dose norepi. picture consistent with vasoplegic shock pattern in otherwise normal echo findings. DO2 moderately reduced but appears adequate for physiological function  SV 52, HR 74, CO 3.8L/min   CaO2 11.4/100cc  DO2 653pbZ7/min; 6.2mlO2/kg/min    # Pulm:  - Patient currently intubated for hypoxemic respiratory failure which is likely 2/2 aspiration PNA  - patient breathing well but remains hypercapnic on CPAP  - patient a condiate for extubation, however, concern for continued aspiration; will not extubate today   - Titrate O2 down as tolerated to prevent hyperoxia  - Patient currently on CPAP trial.  - No dialyzable fluid visualized on US  - PNA treatment as below    # Renal:  - Patient to be dialyzed tomorrow per renal as electrolytes are wnl  - Will need to replace catheter tomorrow  - Patient pending permacath placement by IR once afebrile   - Appreciate renal recommendations    # ID: concern for aspiration PNA with blood culture and sputum culture prelim reports of staph aureus  - patient has staph aureus pneumonia  - will remove central line; low concern for CLAB in context of positive sputum cultures   - Vanc by level and renally dosed Zosyn for empiric Aspiration PNA in the context of immunocompromised state and prolonged hospitalization with poor functional status  - Plan for 5-7 days, currently day 3  - Vanc trough post HD  - F/U final report of blood and sputum cx and sensitivity.  - Will remove RIJ due to prelim report of staph aureus in blood culture    # GI:  - continue feeds via NG, Nepro @ 40cc/hr x 24 hrs to provide 960cc fluids recommended by nutrition  - GI ppx with protonix 40mg daily    # Endocrine:  - Continue tube feeds  - Continue with 7U Lantus and FS q6h with low SSI.    # Heme:  - Will continue to trend H/H  - Currently holding Herceptin    # Skin:  - No sacral decubiti or signs of infection  - Patient with prolonged Triley in RIJ and will be removed today.    # DVT ppx:  - HSC 5000 units Q8 hours    #Goals of Care  - will discuss need for trach and peg with patient's family 2/2 continued concern for aspiration

## 2018-02-23 NOTE — PROGRESS NOTE ADULT - ASSESSMENT
67yoF with breast cancer, HTN who p/w flu, went into respiratory arrest, then PEA arrest, s/p chest tube, TPA and intubation. Course c/b shock liver, RUSS, pneumoperitoneum, and elevated cardiac enzymes. Chest tubes and peritoneal tubes removed, and shock liver now resolved. Now with Staph aureus bacteremia. Pending possible PEG/trach. Pt continues to be anuric requiring HD with fluid removal.

## 2018-02-23 NOTE — PROGRESS NOTE ADULT - SUBJECTIVE AND OBJECTIVE BOX
Follow-up Pulm Progress Note  Caleb Mon MD  [phone]    Events noted  More awake and hemodynamically stable off vasopressors  S Aureus in blood culture  Intubated on CMV FIO2 40%  Afebrile on Zosyn      CULTURES:    Physical Examination:  PULM: scattered rhonchi  CVS: Regular rate and rhythm, no murmurs, rubs, or gallops  ABD: Soft, non-tender  EXT:  No clubbing, cyanosis, or edema    RADIOLOGY REVIEWED  CXR: Extensive R sided opacities c/w pneumonia    CT chest:    TTE:

## 2018-02-23 NOTE — CHART NOTE - NSCHARTNOTEFT_GEN_A_CORE
: Rakesh Kim MS4, Dr. Steiner    INDICATION: POCUS    PROCEDURE:  [x ] LIMITED ECHO  [x ] LIMITED CHEST  [ ] LIMITED RETROPERITONEAL  [ ] LIMITED ABDOMINAL  [ ] LIMITED DVT  [ ] NEEDLE GUIDANCE VASCULAR  [ ] NEEDLE GUIDANCE THORACENTESIS  [ ] NEEDLE GUIDANCE PARACENTESIS  [ ] NEEDLE GUIDANCE PERICARDIOCENTESIS  [ ] OTHER    FINDINGS:    Chest: B-lines, pleural effusions and pleural thickening at lung bases, bilaterally,    Echo: LV function grossly normal, no pericardial effusion, no segmental wall abnormalities, VTI ~ 20.6 cm,     INTERPRETATION:    Chest: B-lines and pleural effusions bilaterally    Echo: LV function grossly normal, stroke volume wnl, no pericardial effusion : Rakesh Kim MS4, Dr. Steiner    INDICATION: POCUS    PROCEDURE:  [x ] LIMITED ECHO  [x ] LIMITED CHEST  [ ] LIMITED RETROPERITONEAL  [ ] LIMITED ABDOMINAL  [ ] LIMITED DVT  [ ] NEEDLE GUIDANCE VASCULAR  [ ] NEEDLE GUIDANCE THORACENTESIS  [ ] NEEDLE GUIDANCE PARACENTESIS  [ ] NEEDLE GUIDANCE PERICARDIOCENTESIS  [ ] OTHER    FINDINGS:    Chest: B-lines, pleural effusions and pleural thickening at lung bases, bilaterally,    Echo: LV function grossly normal, no pericardial effusion, no segmental wall abnormalities, VTI ~ 20.6 cm,     INTERPRETATION:    Chest: B-lines and pleural effusions bilaterally    Echo: LV function grossly normal, stroke volume wnl, no pericardial effusion    with Jatin TITUS

## 2018-02-23 NOTE — PROGRESS NOTE ADULT - SUBJECTIVE AND OBJECTIVE BOX
[facility] DIVISION OF KIDNEY DISEASES AND HYPERTENSION -- FOLLOW UP NOTE  --------------------------------------------------------------------------------  Chief Complaint:  renal failure    24 hour events/subjective:  pt seen in the AM. Remains in ICU. Undergoing CPAP trial. Off sedation and not on pressor support.      PAST HISTORY  --------------------------------------------------------------------------------  No significant changes to PMH, PSH, FHx, SHx, unless otherwise noted    ALLERGIES & MEDICATIONS  --------------------------------------------------------------------------------  Allergies    doxycycline (Rash)  isoniazid (Rash)  NIFEdipine (Urticaria; Hives)  vitamin E (Short breath; Urticaria; Hives)    Intolerances      Standing Inpatient Medications  aspirin  chewable 81 milliGRAM(s) Oral daily  dextrose 5%. 1000 milliLiter(s) IV Continuous <Continuous>  dextrose 5%. 1000 milliLiter(s) IV Continuous <Continuous>  dextrose 5%. 1000 milliLiter(s) IV Continuous <Continuous>  dextrose 50% Injectable 12.5 Gram(s) IV Push once  dextrose 50% Injectable 25 Gram(s) IV Push once  dextrose 50% Injectable 25 Gram(s) IV Push once  heparin  Injectable 5000 Unit(s) SubCutaneous every 8 hours  insulin glargine Injectable (LANTUS) 12 Unit(s) SubCutaneous at bedtime  insulin lispro (HumaLOG) corrective regimen sliding scale   SubCutaneous every 6 hours  norepinephrine Infusion 0.07 MICROgram(s)/kG/Min IV Continuous <Continuous>  nystatin Powder 1 Application(s) Topical two times a day  pantoprazole  Injectable 40 milliGRAM(s) IV Push daily  piperacillin/tazobactam IVPB. 3.375 Gram(s) IV Intermittent every 12 hours  propofol Infusion 1 MICROgram(s)/kG/Min IV Continuous <Continuous>  triamcinolone 0.1% Ointment 1 Application(s) Topical two times a day    PRN Inpatient Medications  chlorhexidine 0.12% Liquid 15 milliLiter(s) Swish and Spit every 4 hours PRN  dextrose Gel 1 Dose(s) Oral once PRN  glucagon  Injectable 1 milliGRAM(s) IntraMuscular once PRN  hydrocortisone 1% Ointment 1 Application(s) Topical every 8 hours PRN      REVIEW OF SYSTEMS  --------------------------------------------------------------------------------  Gen: no pain    VITALS/PHYSICAL EXAM  --------------------------------------------------------------------------------  T(C): 36.9 (02-23-18 @ 16:00), Max: 38.3 (02-22-18 @ 20:00)  HR: 84 (02-23-18 @ 18:00) (71 - 96)  BP: 113/57 (02-23-18 @ 18:00) (86/42 - 169/73)  RR: 21 (02-23-18 @ 18:00) (12 - 54)  SpO2: 100% (02-23-18 @ 18:00) (99% - 100%)  Wt(kg): --        02-22-18 @ 07:01  -  02-23-18 @ 07:00  --------------------------------------------------------  IN: 2291.2 mL / OUT: 2200 mL / NET: 91.2 mL    02-23-18 @ 07:01  -  02-23-18 @ 19:01  --------------------------------------------------------  IN: 247 mL / OUT: 0 mL / NET: 247 mL      Physical Exam:  	Gen: NAD, female  	HEENT: +ET tube  	Pulm: CTA B/L  	CV: RRR, S1S2; no rub  	Abd: +BS, soft, nontender/nondistended  	: No suprapubic tenderness  	UE: Warm, no edema  	LE: Warm, no edema  	Neuro: awake, alert, nods to some questions  	Skin: Warm, without rashes  	Vascular access:  RIJ non-tunneled HD catheter - site c/d/i    LABS/STUDIES  --------------------------------------------------------------------------------              8.4    18.8  >-----------<  285      [02-22-18 @ 23:44]              25.6     136  |  97  |  32  ----------------------------<  175      [02-22-18 @ 23:44]  4.0   |  28  |  2.57        Ca     8.2     [02-22-18 @ 23:44]      Mg     2.2     [02-22-18 @ 23:44]      Phos  4.7     [02-22-18 @ 23:44]    TPro  6.7  /  Alb  2.2  /  TBili  0.4  /  DBili  x   /  AST  35  /  ALT  61  /  AlkPhos  313  [02-22-18 @ 23:44]    PT/INR: PT 11.6 , INR 1.06       [02-22-18 @ 23:44]  PTT: 33.1       [02-22-18 @ 23:44]      Creatinine Trend:  SCr 2.57 [02-22 @ 23:44]  SCr 3.71 [02-22 @ 03:37]  SCr 3.35 [02-21 @ 22:55]  SCr 3.08 [02-21 @ 16:35]  SCr 2.85 [02-21 @ 15:21]    Urinalysis - [01-30-18 @ 12:52]      Color Yellow / Appearance SL Turbid / SG 1.025 / pH 6.0      Gluc 50 / Ketone Negative  / Bili Negative / Urobili Negative       Blood Trace / Protein 150 / Leuk Est Negative / Nitrite Negative      RBC 3-5 / WBC  / Hyaline  / Gran  / Sq Epi  / Non Sq Epi OCC / Bacteria       HbA1c 8.6      [01-31-18 @ 07:15]  Lipid: chol 183, , HDL 10,       [02-07-18 @ 13:34]    HBsAg Nonreact      [02-01-18 @ 22:49]  HCV 0.16, Nonreact      [02-01-18 @ 22:49]

## 2018-02-23 NOTE — PROGRESS NOTE ADULT - SUBJECTIVE AND OBJECTIVE BOX
Diabetes Follow up note:  Interval Hx:  67 year old female w/uncontrolled T2DM w/complications here w/Flu c/b PEA arrest and Acute respiratory failure. Pt seen in MICU. Intubated. Tube feeds off today 2/2 family concern with increased secretions. BG values mostly at goal over past 24 hours.      Review of Systems:  Unable to provide ROS  MEDS:    insulin glargine Injectable (LANTUS) 7 Unit(s) SubCutaneous at bedtime  insulin lispro (HumaLOG) corrective regimen sliding scale   SubCutaneous every 6 hours    piperacillin/tazobactam IVPB. 3.375 Gram(s) IV Intermittent every 12 hours    Allergies    doxycycline (Rash)  isoniazid (Rash)  NIFEdipine (Urticaria; Hives)  vitamin E (Short breath; Urticaria; Hives)        PE:  General: Female lying in bed. NAD.   Vital Signs Last 24 Hrs  T(C): 36.4 (23 Feb 2018 12:00), Max: 38.3 (22 Feb 2018 20:00)  T(F): 97.5 (23 Feb 2018 12:00), Max: 100.9 (22 Feb 2018 20:00)  HR: 83 (23 Feb 2018 15:00) (71 - 103)  BP: 137/63 (23 Feb 2018 15:00) (86/42 - 169/73)  BP(mean): 90 (23 Feb 2018 15:00) (59 - 105)  RR: 14 (23 Feb 2018 15:00) (12 - 54)  SpO2: 100% (23 Feb 2018 15:00) (99% - 100%)  Abd: Soft, NT,ND, NGT in place  Extremities: Warm   Neuro: Awake     LABS:    POCT Blood Glucose.: 161 mg/dL (02-23-18 @ 12:26)  POCT Blood Glucose.: 234 mg/dL (02-23-18 @ 06:46)  POCT Blood Glucose.: 168 mg/dL (02-22-18 @ 21:40)  POCT Blood Glucose.: 141 mg/dL (02-22-18 @ 17:40)  POCT Blood Glucose.: 97 mg/dL (02-22-18 @ 11:43)  POCT Blood Glucose.: 191 mg/dL (02-22-18 @ 05:38)  POCT Blood Glucose.: 167 mg/dL (02-21-18 @ 17:10)  POCT Blood Glucose.: 140 mg/dL (02-21-18 @ 11:57)  POCT Blood Glucose.: 135 mg/dL (02-21-18 @ 11:06)  POCT Blood Glucose.: 251 mg/dL (02-21-18 @ 05:37)  POCT Blood Glucose.: 285 mg/dL (02-20-18 @ 23:32)  POCT Blood Glucose.: 208 mg/dL (02-20-18 @ 18:38)  POCT Blood Glucose.: 258 mg/dL (02-20-18 @ 16:30)                            8.4    18.8  )-----------( 285      ( 22 Feb 2018 23:44 )             25.6       02-22    136  |  97  |  32<H>  ----------------------------<  175<H>  4.0   |  28  |  2.57<H>    Ca    8.2<L>      22 Feb 2018 23:44  Phos  4.7     02-22  Mg     2.2     02-22    TPro  6.7  /  Alb  2.2<L>  /  TBili  0.4  /  DBili  x   /  AST  35  /  ALT  61<H>  /  AlkPhos  313<H>  02-22        Hemoglobin A1C, Whole Blood: 8.6 % <H> [4.0 - 5.6] (01-31-18 @ 07:15)  Hemoglobin A1C, Whole Blood: 8.7 % <H> [4.0 - 5.6] (01-31-18 @ 05:51)            Contact number: marcelino [phone] or [phone]

## 2018-02-23 NOTE — PROGRESS NOTE ADULT - ATTENDING COMMENTS
ARF, HD equiring;recent respiratory arrest from aspiration  1.  ARF--HD.  For catheter change  2.  Respiratory failure acute--volume optimization on HD

## 2018-02-23 NOTE — PROGRESS NOTE ADULT - ASSESSMENT
ACute hypoxemic  resp failure:   S Aureus bacteremia: septic shock, hypotension now resolved  ESRD  S/P CAC with rib fractures and bilateral pneumothoraces  CVA with corical and subcortical infarcts  s/p cardiac arrest    REC    Continue antibiotics   AGree with plans for PEG  Would deferr tracheostomy at this time: patient still in acute phase of sepsis with bacteremia, lpneumonia  Avoid resp muscle fatigue: non fatiguing CPAP/PS trials and overnight rest  Deferr extubation  HD per renal  Decision for tracheostomy per evolving clinical status

## 2018-02-23 NOTE — PROGRESS NOTE ADULT - SUBJECTIVE AND OBJECTIVE BOX
CHIEF COMPLAINT: 67y old woman who presents with a chief complaint of tachypnea with voluminous secretion and respiratory distress    INTERVAL HPI/OVERNIGHT EVENTS: Overnight, pt febrile to 38.3 and was given a dose of acetaminophen, blood culture and sputum culture have prelim report of staph aureus. She was put on CPAP, spontaneous breathing trial. Tube feeds were temporarily held due to pt's family concerns of secretions from mouth. Unable to assess ROS.    OBJECTIVE:  ICU Vital Signs Last 24 Hrs  T(C): 37.7 (23 Feb 2018 08:00), Max: 38.3 (22 Feb 2018 20:00)  T(F): 99.9 (23 Feb 2018 08:00), Max: 100.9 (22 Feb 2018 20:00)  HR: 85 (23 Feb 2018 10:30) (71 - 108)  BP: 118/59 (23 Feb 2018 10:30) (48/27 - 169/73)  BP(mean): 85 (23 Feb 2018 10:30) (33 - 111)  ABP: --  ABP(mean): --  RR: 17 (23 Feb 2018 10:30) (16 - 54)  SpO2: 100% (23 Feb 2018 10:30) (94% - 100%)    Mode: CPAP with PS, FiO2: 40, PEEP: 5, PS: 5, MAP: 7, PIP: 10    02-22 @ 07:01  -  02-23 @ 07:00  --------------------------------------------------------  IN: 2291.2 mL / OUT: 2200 mL / NET: 91.2 mL    02-23 @ 07:01  - 02-23 @ 11:01  --------------------------------------------------------  IN: 82 mL / OUT: 0 mL / NET: 82 mL    POCT Blood Glucose.: 234 mg/dL (23 Feb 2018 06:46)    PHYSICAL EXAM:  General: elderly woman in no acute distress  HEENT: NC/AT; PERRL, clear conjunctiva  Respiratory: intubated, no wheezes or ronchi  Cardiovascular: +S1/S2; RRR  Abdomen: soft, NT/ND  Extremities: warm to touch, no edema, peripheral pulses intact 2+  Skin: macular rash on arms and chest, RIJ in place, peripheral lines in place with no evidence of infiltration  Neurological: arousable but not following commands    MEDICATIONS:  MEDICATIONS  (STANDING):  aspirin  chewable 81 milliGRAM(s) Oral daily  heparin  Injectable 5000 Unit(s) SubCutaneous every 8 hours  insulin glargine Injectable (LANTUS) 7 Unit(s) SubCutaneous at bedtime  insulin lispro (HumaLOG) corrective regimen sliding scale   SubCutaneous every 6 hours  norepinephrine Infusion 0.07 MICROgram(s)/kG/Min (4.909 mL/Hr) IV Continuous <Continuous>  nystatin Powder 1 Application(s) Topical two times a day  pantoprazole  Injectable 40 milliGRAM(s) IV Push daily  piperacillin/tazobactam IVPB. 3.375 Gram(s) IV Intermittent every 12 hours  propofol Infusion 1 MICROgram(s)/kG/Min (0.449 mL/Hr) IV Continuous <Continuous>  triamcinolone 0.1% Ointment 1 Application(s) Topical two times a day    MEDICATIONS  (PRN):  chlorhexidine 0.12% Liquid 15 milliLiter(s) Swish and Spit every 4 hours PRN mouth hygiene  dextrose Gel 1 Dose(s) Oral once PRN Blood Glucose LESS THAN 70 milliGRAM(s)/deciliter  glucagon  Injectable 1 milliGRAM(s) IntraMuscular once PRN Glucose LESS THAN 70 milligrams/deciliter    ALLERGIES:  doxycycline (Rash)  isoniazid (Rash)  NIFEdipine (Urticaria; Hives)  vitamin E (Short breath; Urticaria; Hives)    LABS:                        8.4    18.8  )-----------( 285      ( 22 Feb 2018 23:44 )             25.6     WBC Trend: 18.8 <-- 11.5 <-- 15.7 <-- 8.98 <-- 6.4 <-- 7.28  Hgb Trend: 8.4 <-- 8.2 <-- 9.2 <-- 8.6 <-- 7.1 <--6.4    02-22    136  |  97  |  32<H>  ----------------------------<  175<H>  4.0   |  28  |  2.57<H>    Ca    8.2<L>      22 Feb 2018 23:44  Phos  4.7     02-22  Mg     2.2     02-22    TPro  6.7  /  Alb  2.2<L>  /  TBili  0.4  /  DBili  x   /  AST  35  /  ALT  61<H>  /  AlkPhos  313<H>  02-22    Cr trend: 2.57 <-- 3.71<--, 3.35<--, 3.08<--, 2.85<--, 2.67<--, 2.99 <-- 3.02 <-- 4.29 <<<-- (Cr at admission 0.98)    PT/INR - ( 22 Feb 2018 23:44 )   PT: 11.6 sec;   INR: 1.06 ratio      PTT - ( 22 Feb 2018 23:44 )  PTT:33.1 sec    Blood Gas Profile - Arterial (02.23.18 @ 09:19)    pH, Arterial: 7.32    pCO2, Arterial: 51 mmHg    pO2, Arterial: 192 mmHg    HCO3, Arterial: 26 mmoL/L    Base Excess, Arterial: .1 mmol/L    Oxygen Saturation, Arterial: 100 %    Total CO2, Arterial: 27 mmoL/L    Blood Gas Arterial, Lactate (02.23.18 @ 09:19)    Blood Gas Arterial, Lactate: 1.9    Culture - Sputum . (02.22.18 @ 07:29)    Specimen Source: .Sputum Sputum trap    Culture Results:   Numerous Staphylococcus aureus    Culture - Blood (02.22.18 @ 02:22)    -  Staphylococcus aureus: Detec Any isolate of Staphylococcus aureus from a blood culture is NOT considered a contaminant.    Gram Stain:   Growth in anaerobic bottle:  Gram Positive Cocci in Clusters    Specimen Source: .Blood Blood    Organism: Blood Culture PCR    Culture Results:   Growth in anaerobic bottle:  Gram Positive Cocci in Clusters  "Due to technical problems, Proteus sp. will Not be reported as part of  the BCID panel until further notice"  ***Blood Panel PCR results on this specimen are available  approximately 3 hours after the Gram stain result.***  Gram stain, PCR, and/or culture results may not always  correspond due to difference in methodologies.

## 2018-02-23 NOTE — PROGRESS NOTE ADULT - PROBLEM SELECTOR PLAN 1
likely from ATN in setting of cardiac arrest. Pt continues to be oligo-anuric, requiring dialysis. Please remove non tunneled catheter and replace with tunneled HD catheter. Will plan for HD tomorrow.

## 2018-02-23 NOTE — PROGRESS NOTE ADULT - ASSESSMENT
67F PMH HTN, DMT2, Breast Cancer (diagnosed in Feb 2017) currently on trastuzumab (last dose Jan 20th), originally presented 1/30 with fever found to have influenza subsequently went into PEA arrest x 2 with ROSC, complicated by bilateral pneumothoraces with pneumomediastinum s/p 3 chest tubes, and also new ESRD requiring almost daily HD. readmitted to the MICU for hypoxic respiratory failure likely 2/2 aspiration.    # Neuro:  - Pt currently arousable off propofol.   - Known watershed ischemia of the occiput from low flow state during admission 2/2 flu  - Low suspicion for repeat CVA event, no need for CT or MR of the head as of now    # CV:  - Patient currently requiring low levels of Levo to maintain MAP > 65, suspect vasoplegia 2/2 aspiration PNA and chronic illness and possible improvement with discontinuation of venodilation 2/2 propofol.  - No EF derangements noted on POCUS with VTI measurement at 20.2 cm suggestive of normal range SV  - Hemodynamic Analysis: patient on low dose norepi. picture consistent with vasoplegic shock pattern in otherwise normal echo findings. DO2 moderately reduced but appears adequate for physiological function  SV 52, HR 74, CO 3.8L/min   CaO2 11.4/100cc  DO2 872vrT9/min; 6.2mlO2/kg/min    # Pulm:  - Patient currently intubated for hypoxemic respiratory failure which is likely 2/2 aspiration PNA  - Titrate O2 down as tolerated to prevent hyperoxia  - Patient currently on CPAP trial.  - No dialyzable fluid visualized on US  - PNA treatment as below    # Renal:  - Patient to be dialyzed tomorrow per renal as electrolytes are wnl  - Will need to replace catheter tomorrow  - Patient pending permacath placement by IR once afebrile  - Appreciate renal recommendations    # ID: concern for aspiration PNA with blood culture and sputum culture prelim reports of staph aureus  - Vanc by level and renally dosed Zosyn for empiric Aspiration PNA in the context of immunocompromised state and prolonged hospitalization with poor functional status  - Plan for 5-7 days, currently day 3  - Vanc trough post HD  - F/U final report of blood and sputum cx and sensitivity.  - Will remove RIJ due to prelim report of staph aureus in blood culture    # GI:  - continue feeds via NG, Nepro @ 40cc/hr x 24 hrs to provide 960cc fluids recommended by nutrition  - GI ppx with protonix 40mg daily    # Endocrine:  - Continue tube feeds  - Continue with 7U Lantus and FS q6h with low SSI.    # Heme:  - Will continue to trend H/H  - Currently holding Herceptin    # Skin:  - No sacral decubiti or signs of infection  - Patient with prolonged Triley in RIJ and will be removed today.    # DVT ppx:  - HSC 5000 units Q8 hours    Yaima Munroe ([id number])  Please refer to attending note for definite plan

## 2018-02-23 NOTE — PROGRESS NOTE ADULT - ASSESSMENT
66 y/o F with T2DM uncontrolled on insulin, osteoporosis, HTN, here with acute respiratory failure 2/2 influenza s/p PEA arrest x 2. BG values. BG values improved while NPO. Team to restart tube feeds today. BG goal (140-180mg/dl).

## 2018-02-23 NOTE — PROGRESS NOTE ADULT - PROBLEM SELECTOR PLAN 1
-test BG Q6h  -Can Increase Lantus 12 units QHS  -Would Modify humalog correction scale to moderate once tube feeds resumed.  -Will likely need NPH regimen once tube feeds advanced and at goal  -discussed w/family and MICU team  will monitor.  pager: [phone]

## 2018-02-23 NOTE — PROGRESS NOTE ADULT - SUBJECTIVE AND OBJECTIVE BOX
CHIEF COMPLAINT: 67y old woman who presents with a chief complaint of tachypnea with voluminous secretion and respiratory distress    Interval Events: Overnight, pt febrile to 38.3 and was given a dose of acetaminophen, blood culture and sputum culture have prelim report of staph aureus. Did well on CPAP, spontaneous breathing trial this morning. Tube feeds were temporarily held due to pt's family concerns of secretions from mouth. Unable to assess ROS.      OBJECTIVE:  ICU Vital Signs Last 24 Hrs  T(C): 36.4 (23 Feb 2018 12:00), Max: 38.3 (22 Feb 2018 20:00)  T(F): 97.5 (23 Feb 2018 12:00), Max: 100.9 (22 Feb 2018 20:00)  HR: 85 (23 Feb 2018 11:45) (71 - 108)  BP: 121/57 (23 Feb 2018 11:45) (48/27 - 169/73)  BP(mean): 82 (23 Feb 2018 11:45) (33 - 111)  ABP: --  ABP(mean): --  RR: 16 (23 Feb 2018 11:45) (15 - 54)  SpO2: 100% (23 Feb 2018 11:45) (94% - 100%)    Mode: CPAP with PS, FiO2: 40, PEEP: 5, PS: 5, MAP: 7, PIP: 10    02-22 @ 07:01 - 02-23 @ 07:00  --------------------------------------------------------  IN: 2291.2 mL / OUT: 2200 mL / NET: 91.2 mL    02-23 @ 07:01  - 02-23 @ 12:02  --------------------------------------------------------  IN: 82 mL / OUT: 0 mL / NET: 82 mL      CAPILLARY BLOOD GLUCOSE      POCT Blood Glucose.: 234 mg/dL (23 Feb 2018 06:46)      PHYSICAL EXAM:  General: elderly woman in no acute distress  HEENT: NC/AT; PERRL, clear conjunctiva  Respiratory: intubated, no wheezes or ronchi  Cardiovascular: +S1/S2; RRR  Abdomen: soft, NT/ND  Extremities: warm to touch, no edema, peripheral pulses intact 2+  Skin: macular rash on arms and chest, RIJ in place, peripheral lines in place with no evidence of infiltration  Neurological: arousable but not following commands      HOSPITAL MEDICATIONS:  Standing Meds:  aspirin  chewable 81 milliGRAM(s) Oral daily  dextrose 5%. 1000 milliLiter(s) IV Continuous <Continuous>  dextrose 5%. 1000 milliLiter(s) IV Continuous <Continuous>  dextrose 5%. 1000 milliLiter(s) IV Continuous <Continuous>  dextrose 50% Injectable 12.5 Gram(s) IV Push once  dextrose 50% Injectable 25 Gram(s) IV Push once  dextrose 50% Injectable 25 Gram(s) IV Push once  heparin  Injectable 5000 Unit(s) SubCutaneous every 8 hours  insulin glargine Injectable (LANTUS) 7 Unit(s) SubCutaneous at bedtime  insulin lispro (HumaLOG) corrective regimen sliding scale   SubCutaneous every 6 hours  norepinephrine Infusion 0.07 MICROgram(s)/kG/Min IV Continuous <Continuous>  nystatin Powder 1 Application(s) Topical two times a day  pantoprazole  Injectable 40 milliGRAM(s) IV Push daily  piperacillin/tazobactam IVPB. 3.375 Gram(s) IV Intermittent every 12 hours  propofol Infusion 1 MICROgram(s)/kG/Min IV Continuous <Continuous>  triamcinolone 0.1% Ointment 1 Application(s) Topical two times a day      PRN Meds:  chlorhexidine 0.12% Liquid 15 milliLiter(s) Swish and Spit every 4 hours PRN  dextrose Gel 1 Dose(s) Oral once PRN  glucagon  Injectable 1 milliGRAM(s) IntraMuscular once PRN      LABS:                        8.4    18.8  )-----------( 285      ( 22 Feb 2018 23:44 )             25.6     Hgb Trend: 8.4<--, 8.2<--, 9.2<--, 8.6<--, 7.1<--  02-22    136  |  97  |  32<H>  ----------------------------<  175<H>  4.0   |  28  |  2.57<H>    Ca    8.2<L>      22 Feb 2018 23:44  Phos  4.7     02-22  Mg     2.2     02-22    TPro  6.7  /  Alb  2.2<L>  /  TBili  0.4  /  DBili  x   /  AST  35  /  ALT  61<H>  /  AlkPhos  313<H>  02-22    Creatinine Trend: 2.57<--, 3.71<--, 3.35<--, 3.08<--, 2.85<--, 2.67<--  PT/INR - ( 22 Feb 2018 23:44 )   PT: 11.6 sec;   INR: 1.06 ratio         PTT - ( 22 Feb 2018 23:44 )  PTT:33.1 sec    Arterial Blood Gas:  02-23 @ 09:19  7.32/51/192/26/100/.1  ABG lactate: --  Arterial Blood Gas:  02-23 @ 07:31  7.32/53/138/27/99/1.1  ABG lactate: --  Arterial Blood Gas:  02-21 @ 15:11  7.34/52/100/27/99/1.5  ABG lactate: --        MICROBIOLOGY:     Culture - Sputum (collected 22 Feb 2018 07:29)  Source: .Sputum Sputum trap  Preliminary Report (23 Feb 2018 08:08):    Numerous Staphylococcus aureus    Culture - Blood (collected 22 Feb 2018 02:22)  Source: .Blood Blood  Gram Stain (23 Feb 2018 01:53):    Growth in anaerobic bottle:    Gram Positive Cocci in Clusters  Preliminary Report (23 Feb 2018 01:53):    Growth in anaerobic bottle:    Gram Positive Cocci in Clusters    "Due to technical problems, Proteus sp. will Not be reported as part of    the BCID panel until further notice"    ***Blood Panel PCR results on this specimen are available    approximately 3 hours after the Gram stain result.***    Gram stain, PCR, and/or culture results may not always    correspond due to difference in methodologies.    ************************************************************    This PCR assay was performed using [employer].    The following targets are tested for: Enterococcus,    vancomycin resistant enterococci, Listeria monocytogenes,    coagulase negative staphylococci, S. aureus,    methicillin resistant S. aureus, Streptococcus agalactiae    (Group B), S. pneumoniae,S. pyogenes (Group A),    Acinetobacter baumannii, Enterobacter cloacae, E. coli,    Klebsiella oxytoca, K. pneumoniae, Proteus sp.,    Serratia marcescens, Haemophilus influenzae,    Neisseria meningitidis, Pseudomonas aeruginosa, Candida    albicans, C. glabrata, C krusei, C parapsilosis,    C. tropicalis and the KPC resistance gene.  Organism: Blood Culture PCR (23 Feb 2018 04:34)  Organism: Blood Culture PCR (23 Feb 2018 04:34)    Culture - Blood (collected 22 Feb 2018 00:54)  Source: .Blood Blood  Preliminary Report (23 Feb 2018 01:02):    No growth to date.      RADIOLOGY:  [ ] Reviewed and interpreted by me    EKG:

## 2018-02-23 NOTE — PROGRESS NOTE ADULT - ATTENDING COMMENTS
Critically ill on vent with aspiration PNA d/w family re trach/PEG  Frequent bedside visits with therapy change today. Crit Care Time Today 35 min +

## 2018-02-24 LAB
-  AMPICILLIN/SULBACTAM: SIGNIFICANT CHANGE UP
-  AMPICILLIN/SULBACTAM: SIGNIFICANT CHANGE UP
-  CEFAZOLIN: SIGNIFICANT CHANGE UP
-  CEFAZOLIN: SIGNIFICANT CHANGE UP
-  CIPROFLOXACIN: SIGNIFICANT CHANGE UP
-  CIPROFLOXACIN: SIGNIFICANT CHANGE UP
-  CLINDAMYCIN: SIGNIFICANT CHANGE UP
-  CLINDAMYCIN: SIGNIFICANT CHANGE UP
-  ERYTHROMYCIN: SIGNIFICANT CHANGE UP
-  ERYTHROMYCIN: SIGNIFICANT CHANGE UP
-  GENTAMICIN: SIGNIFICANT CHANGE UP
-  GENTAMICIN: SIGNIFICANT CHANGE UP
-  LEVOFLOXACIN: SIGNIFICANT CHANGE UP
-  LEVOFLOXACIN: SIGNIFICANT CHANGE UP
-  MOXIFLOXACIN(AEROBIC): SIGNIFICANT CHANGE UP
-  MOXIFLOXACIN(AEROBIC): SIGNIFICANT CHANGE UP
-  OXACILLIN: SIGNIFICANT CHANGE UP
-  OXACILLIN: SIGNIFICANT CHANGE UP
-  RIFAMPIN: SIGNIFICANT CHANGE UP
-  RIFAMPIN: SIGNIFICANT CHANGE UP
-  TETRACYCLINE: SIGNIFICANT CHANGE UP
-  TETRACYCLINE: SIGNIFICANT CHANGE UP
-  TRIMETHOPRIM/SULFAMETHOXAZOLE: SIGNIFICANT CHANGE UP
-  TRIMETHOPRIM/SULFAMETHOXAZOLE: SIGNIFICANT CHANGE UP
-  VANCOMYCIN: SIGNIFICANT CHANGE UP
-  VANCOMYCIN: SIGNIFICANT CHANGE UP
ALBUMIN SERPL ELPH-MCNC: 2.1 G/DL — LOW (ref 3.3–5)
ALBUMIN SERPL ELPH-MCNC: 2.2 G/DL — LOW (ref 3.3–5)
ALP SERPL-CCNC: 323 U/L — HIGH (ref 40–120)
ALP SERPL-CCNC: 345 U/L — HIGH (ref 40–120)
ALT FLD-CCNC: 63 U/L RC — HIGH (ref 10–45)
ALT FLD-CCNC: 70 U/L RC — HIGH (ref 10–45)
ANION GAP SERPL CALC-SCNC: 14 MMOL/L — SIGNIFICANT CHANGE UP (ref 5–17)
ANION GAP SERPL CALC-SCNC: 18 MMOL/L — HIGH (ref 5–17)
APTT BLD: 31.4 SEC — SIGNIFICANT CHANGE UP (ref 27.5–37.4)
APTT BLD: 38 SEC — HIGH (ref 27.5–37.4)
AST SERPL-CCNC: 44 U/L — HIGH (ref 10–40)
AST SERPL-CCNC: 74 U/L — HIGH (ref 10–40)
BASE EXCESS BLDV CALC-SCNC: -1.2 MMOL/L — SIGNIFICANT CHANGE UP (ref -2–2)
BASOPHILS # BLD AUTO: 0 K/UL — SIGNIFICANT CHANGE UP (ref 0–0.2)
BASOPHILS NFR BLD AUTO: 0.2 % — SIGNIFICANT CHANGE UP (ref 0–2)
BILIRUB SERPL-MCNC: 0.4 MG/DL — SIGNIFICANT CHANGE UP (ref 0.2–1.2)
BILIRUB SERPL-MCNC: 0.8 MG/DL — SIGNIFICANT CHANGE UP (ref 0.2–1.2)
BLD GP AB SCN SERPL QL: NEGATIVE — SIGNIFICANT CHANGE UP
BUN SERPL-MCNC: 31 MG/DL — HIGH (ref 7–23)
BUN SERPL-MCNC: 49 MG/DL — HIGH (ref 7–23)
CA-I SERPL-SCNC: 1.1 MMOL/L — LOW (ref 1.12–1.3)
CALCIUM SERPL-MCNC: 8.4 MG/DL — SIGNIFICANT CHANGE UP (ref 8.4–10.5)
CALCIUM SERPL-MCNC: 8.6 MG/DL — SIGNIFICANT CHANGE UP (ref 8.4–10.5)
CHLORIDE BLDV-SCNC: 102 MMOL/L — SIGNIFICANT CHANGE UP (ref 96–108)
CHLORIDE SERPL-SCNC: 94 MMOL/L — LOW (ref 96–108)
CHLORIDE SERPL-SCNC: 98 MMOL/L — SIGNIFICANT CHANGE UP (ref 96–108)
CO2 BLDV-SCNC: 26 MMOL/L — SIGNIFICANT CHANGE UP (ref 22–30)
CO2 SERPL-SCNC: 23 MMOL/L — SIGNIFICANT CHANGE UP (ref 22–31)
CO2 SERPL-SCNC: 27 MMOL/L — SIGNIFICANT CHANGE UP (ref 22–31)
CREAT SERPL-MCNC: 2.73 MG/DL — HIGH (ref 0.5–1.3)
CREAT SERPL-MCNC: 3.91 MG/DL — HIGH (ref 0.5–1.3)
CULTURE RESULTS: SIGNIFICANT CHANGE UP
EOSINOPHIL # BLD AUTO: 1.6 K/UL — HIGH (ref 0–0.5)
EOSINOPHIL NFR BLD AUTO: 18 % — HIGH (ref 0–6)
GAS PNL BLDV: 137 MMOL/L — SIGNIFICANT CHANGE UP (ref 136–145)
GAS PNL BLDV: SIGNIFICANT CHANGE UP
GAS PNL BLDV: SIGNIFICANT CHANGE UP
GLUCOSE BLDC GLUCOMTR-MCNC: 149 MG/DL — HIGH (ref 70–99)
GLUCOSE BLDC GLUCOMTR-MCNC: 170 MG/DL — HIGH (ref 70–99)
GLUCOSE BLDC GLUCOMTR-MCNC: 172 MG/DL — HIGH (ref 70–99)
GLUCOSE BLDC GLUCOMTR-MCNC: 199 MG/DL — HIGH (ref 70–99)
GLUCOSE BLDC GLUCOMTR-MCNC: 98 MG/DL — SIGNIFICANT CHANGE UP (ref 70–99)
GLUCOSE BLDV-MCNC: 164 MG/DL — HIGH (ref 70–99)
GLUCOSE SERPL-MCNC: 206 MG/DL — HIGH (ref 70–99)
GLUCOSE SERPL-MCNC: 220 MG/DL — HIGH (ref 70–99)
HCO3 BLDV-SCNC: 25 MMOL/L — SIGNIFICANT CHANGE UP (ref 21–29)
HCT VFR BLD CALC: 21.8 % — LOW (ref 34.5–45)
HCT VFR BLD CALC: 22.3 % — LOW (ref 34.5–45)
HCT VFR BLD CALC: 23.4 % — LOW (ref 34.5–45)
HCT VFR BLDA CALC: 16 % — CRITICAL LOW (ref 39–50)
HGB BLD CALC-MCNC: 4.9 G/DL — CRITICAL LOW (ref 11.5–15.5)
HGB BLD-MCNC: 7.2 G/DL — LOW (ref 11.5–15.5)
HGB BLD-MCNC: 7.4 G/DL — LOW (ref 11.5–15.5)
HGB BLD-MCNC: 7.4 G/DL — LOW (ref 11.5–15.5)
HOROWITZ INDEX BLDV+IHG-RTO: 40 — SIGNIFICANT CHANGE UP
INR BLD: 1 RATIO — SIGNIFICANT CHANGE UP (ref 0.88–1.16)
INR BLD: 1.04 RATIO — SIGNIFICANT CHANGE UP (ref 0.88–1.16)
LACTATE BLDV-MCNC: 0.8 MMOL/L — SIGNIFICANT CHANGE UP (ref 0.7–2)
LYMPHOCYTES # BLD AUTO: 0.6 K/UL — LOW (ref 1–3.3)
LYMPHOCYTES # BLD AUTO: 6.4 % — LOW (ref 13–44)
MAGNESIUM SERPL-MCNC: 2.1 MG/DL — SIGNIFICANT CHANGE UP (ref 1.6–2.6)
MAGNESIUM SERPL-MCNC: 2.5 MG/DL — SIGNIFICANT CHANGE UP (ref 1.6–2.6)
MCHC RBC-ENTMCNC: 30.7 PG — SIGNIFICANT CHANGE UP (ref 27–34)
MCHC RBC-ENTMCNC: 31.7 GM/DL — LOW (ref 32–36)
MCHC RBC-ENTMCNC: 31.7 PG — SIGNIFICANT CHANGE UP (ref 27–34)
MCHC RBC-ENTMCNC: 32.1 PG — SIGNIFICANT CHANGE UP (ref 27–34)
MCHC RBC-ENTMCNC: 33.1 GM/DL — SIGNIFICANT CHANGE UP (ref 32–36)
MCHC RBC-ENTMCNC: 33.2 GM/DL — SIGNIFICANT CHANGE UP (ref 32–36)
MCV RBC AUTO: 95.9 FL — SIGNIFICANT CHANGE UP (ref 80–100)
MCV RBC AUTO: 96.8 FL — SIGNIFICANT CHANGE UP (ref 80–100)
MCV RBC AUTO: 97 FL — SIGNIFICANT CHANGE UP (ref 80–100)
METHOD TYPE: SIGNIFICANT CHANGE UP
METHOD TYPE: SIGNIFICANT CHANGE UP
MONOCYTES # BLD AUTO: 0.7 K/UL — SIGNIFICANT CHANGE UP (ref 0–0.9)
MONOCYTES NFR BLD AUTO: 8.3 % — SIGNIFICANT CHANGE UP (ref 2–14)
NEUTROPHILS # BLD AUTO: 6 K/UL — SIGNIFICANT CHANGE UP (ref 1.8–7.4)
NEUTROPHILS NFR BLD AUTO: 67.1 % — SIGNIFICANT CHANGE UP (ref 43–77)
ORGANISM # SPEC MICROSCOPIC CNT: SIGNIFICANT CHANGE UP
OTHER CELLS CSF MANUAL: 6 ML/DL — LOW (ref 18–22)
PCO2 BLDV: 53 MMHG — HIGH (ref 35–50)
PH BLDV: 7.28 — LOW (ref 7.35–7.45)
PHOSPHATE SERPL-MCNC: 4.8 MG/DL — HIGH (ref 2.5–4.5)
PHOSPHATE SERPL-MCNC: 6.5 MG/DL — HIGH (ref 2.5–4.5)
PLATELET # BLD AUTO: 211 K/UL — SIGNIFICANT CHANGE UP (ref 150–400)
PLATELET # BLD AUTO: 219 K/UL — SIGNIFICANT CHANGE UP (ref 150–400)
PLATELET # BLD AUTO: 223 K/UL — SIGNIFICANT CHANGE UP (ref 150–400)
PO2 BLDV: 57 MMHG — HIGH (ref 25–45)
POTASSIUM BLDV-SCNC: 3.8 MMOL/L — SIGNIFICANT CHANGE UP (ref 3.5–5)
POTASSIUM SERPL-MCNC: 3.9 MMOL/L — SIGNIFICANT CHANGE UP (ref 3.5–5.3)
POTASSIUM SERPL-MCNC: 3.9 MMOL/L — SIGNIFICANT CHANGE UP (ref 3.5–5.3)
POTASSIUM SERPL-SCNC: 3.9 MMOL/L — SIGNIFICANT CHANGE UP (ref 3.5–5.3)
POTASSIUM SERPL-SCNC: 3.9 MMOL/L — SIGNIFICANT CHANGE UP (ref 3.5–5.3)
PROT SERPL-MCNC: 6.4 G/DL — SIGNIFICANT CHANGE UP (ref 6–8.3)
PROT SERPL-MCNC: 6.7 G/DL — SIGNIFICANT CHANGE UP (ref 6–8.3)
PROTHROM AB SERPL-ACNC: 10.8 SEC — SIGNIFICANT CHANGE UP (ref 9.8–12.7)
PROTHROM AB SERPL-ACNC: 11.3 SEC — SIGNIFICANT CHANGE UP (ref 9.8–12.7)
RBC # BLD: 2.28 M/UL — LOW (ref 3.8–5.2)
RBC # BLD: 2.31 M/UL — LOW (ref 3.8–5.2)
RBC # BLD: 2.41 M/UL — LOW (ref 3.8–5.2)
RBC # FLD: 15.5 % — HIGH (ref 10.3–14.5)
RBC # FLD: 15.6 % — HIGH (ref 10.3–14.5)
RBC # FLD: 15.7 % — HIGH (ref 10.3–14.5)
RH IG SCN BLD-IMP: POSITIVE — SIGNIFICANT CHANGE UP
SAO2 % BLDV: 89 % — HIGH (ref 67–88)
SODIUM SERPL-SCNC: 135 MMOL/L — SIGNIFICANT CHANGE UP (ref 135–145)
SODIUM SERPL-SCNC: 139 MMOL/L — SIGNIFICANT CHANGE UP (ref 135–145)
SPECIMEN SOURCE: SIGNIFICANT CHANGE UP
WBC # BLD: 7.6 K/UL — SIGNIFICANT CHANGE UP (ref 3.8–10.5)
WBC # BLD: 8.9 K/UL — SIGNIFICANT CHANGE UP (ref 3.8–10.5)
WBC # BLD: 9.2 K/UL — SIGNIFICANT CHANGE UP (ref 3.8–10.5)
WBC # FLD AUTO: 7.6 K/UL — SIGNIFICANT CHANGE UP (ref 3.8–10.5)
WBC # FLD AUTO: 8.9 K/UL — SIGNIFICANT CHANGE UP (ref 3.8–10.5)
WBC # FLD AUTO: 9.2 K/UL — SIGNIFICANT CHANGE UP (ref 3.8–10.5)

## 2018-02-24 PROCEDURE — 36556 INSERT NON-TUNNEL CV CATH: CPT

## 2018-02-24 PROCEDURE — 90935 HEMODIALYSIS ONE EVALUATION: CPT | Mod: GC

## 2018-02-24 PROCEDURE — 71045 X-RAY EXAM CHEST 1 VIEW: CPT | Mod: 26

## 2018-02-24 PROCEDURE — 99291 CRITICAL CARE FIRST HOUR: CPT

## 2018-02-24 PROCEDURE — 99232 SBSQ HOSP IP/OBS MODERATE 35: CPT

## 2018-02-24 RX ORDER — NAFCILLIN 10 G/100ML
1 INJECTION, POWDER, FOR SOLUTION INTRAVENOUS ONCE
Qty: 0 | Refills: 0 | Status: COMPLETED | OUTPATIENT
Start: 2018-02-24 | End: 2018-02-24

## 2018-02-24 RX ORDER — NAFCILLIN 10 G/100ML
1 INJECTION, POWDER, FOR SOLUTION INTRAVENOUS EVERY 4 HOURS
Qty: 0 | Refills: 0 | Status: DISCONTINUED | OUTPATIENT
Start: 2018-02-24 | End: 2018-02-26

## 2018-02-24 RX ORDER — DIPHENHYDRAMINE HCL 50 MG
25 CAPSULE ORAL ONCE
Qty: 0 | Refills: 0 | Status: COMPLETED | OUTPATIENT
Start: 2018-02-24 | End: 2018-02-24

## 2018-02-24 RX ORDER — INSULIN GLARGINE 100 [IU]/ML
10 INJECTION, SOLUTION SUBCUTANEOUS AT BEDTIME
Qty: 0 | Refills: 0 | Status: DISCONTINUED | OUTPATIENT
Start: 2018-02-24 | End: 2018-02-25

## 2018-02-24 RX ORDER — NAFCILLIN 10 G/100ML
INJECTION, POWDER, FOR SOLUTION INTRAVENOUS
Qty: 0 | Refills: 0 | Status: DISCONTINUED | OUTPATIENT
Start: 2018-02-24 | End: 2018-02-26

## 2018-02-24 RX ORDER — DESMOPRESSIN ACETATE 0.1 MG/1
20 TABLET ORAL ONCE
Qty: 0 | Refills: 0 | Status: COMPLETED | OUTPATIENT
Start: 2018-02-24 | End: 2018-02-24

## 2018-02-24 RX ADMIN — HEPARIN SODIUM 5000 UNIT(S): 5000 INJECTION INTRAVENOUS; SUBCUTANEOUS at 21:19

## 2018-02-24 RX ADMIN — PANTOPRAZOLE SODIUM 40 MILLIGRAM(S): 20 TABLET, DELAYED RELEASE ORAL at 11:48

## 2018-02-24 RX ADMIN — Medication 1 APPLICATION(S): at 05:20

## 2018-02-24 RX ADMIN — INSULIN GLARGINE 10 UNIT(S): 100 INJECTION, SOLUTION SUBCUTANEOUS at 22:22

## 2018-02-24 RX ADMIN — NYSTATIN CREAM 1 APPLICATION(S): 100000 CREAM TOPICAL at 06:43

## 2018-02-24 RX ADMIN — Medication 1: at 23:14

## 2018-02-24 RX ADMIN — NAFCILLIN 100 GRAM(S): 10 INJECTION, POWDER, FOR SOLUTION INTRAVENOUS at 15:44

## 2018-02-24 RX ADMIN — PIPERACILLIN AND TAZOBACTAM 25 GRAM(S): 4; .5 INJECTION, POWDER, LYOPHILIZED, FOR SOLUTION INTRAVENOUS at 05:05

## 2018-02-24 RX ADMIN — Medication 1: at 05:12

## 2018-02-24 RX ADMIN — NAFCILLIN 100 GRAM(S): 10 INJECTION, POWDER, FOR SOLUTION INTRAVENOUS at 18:45

## 2018-02-24 RX ADMIN — HEPARIN SODIUM 5000 UNIT(S): 5000 INJECTION INTRAVENOUS; SUBCUTANEOUS at 13:44

## 2018-02-24 RX ADMIN — ERYTHROPOIETIN 4000 UNIT(S): 10000 INJECTION, SOLUTION INTRAVENOUS; SUBCUTANEOUS at 06:53

## 2018-02-24 RX ADMIN — Medication 25 MILLIGRAM(S): at 23:09

## 2018-02-24 RX ADMIN — DESMOPRESSIN ACETATE 220 MICROGRAM(S): 0.1 TABLET ORAL at 06:49

## 2018-02-24 RX ADMIN — Medication 1 APPLICATION(S): at 18:28

## 2018-02-24 RX ADMIN — Medication 1: at 18:40

## 2018-02-24 RX ADMIN — Medication 25 MILLIGRAM(S): at 02:33

## 2018-02-24 RX ADMIN — HEPARIN SODIUM 5000 UNIT(S): 5000 INJECTION INTRAVENOUS; SUBCUTANEOUS at 05:05

## 2018-02-24 RX ADMIN — Medication 25 MILLIGRAM(S): at 19:45

## 2018-02-24 RX ADMIN — NAFCILLIN 100 GRAM(S): 10 INJECTION, POWDER, FOR SOLUTION INTRAVENOUS at 21:19

## 2018-02-24 RX ADMIN — Medication 81 MILLIGRAM(S): at 11:48

## 2018-02-24 RX ADMIN — NYSTATIN CREAM 1 APPLICATION(S): 100000 CREAM TOPICAL at 18:28

## 2018-02-24 NOTE — PROGRESS NOTE ADULT - ASSESSMENT
# Neuro:  - Pt currently arousable off propofol.   - Known watershed ischemia of the occiput from low flow state during admission 2/2 flu  - Low suspicion for repeat CVA event, no need for CT or MR of the head as of now    # CV:  - Patient currently requiring low levels of Levo to maintain MAP > 65, suspect vasoplegia 2/2 aspiration PNA and chronic illness and possible improvement with discontinuation of venodilation 2/2 propofol.  - No EF derangements noted on POCUS with VTI measurement at 20.2 cm suggestive of normal range SV  - Hemodynamic Analysis: patient on low dose norepi. picture consistent with vasoplegic shock pattern in otherwise normal echo findings. DO2 moderately reduced but appears adequate for physiological function  SV 52, HR 74, CO 3.8L/min   CaO2 11.4/100cc  DO2 948euK0/min; 6.2mlO2/kg/min    # Pulm:  - patient a candidate for extubation, however, concern for continued aspiration; will not extubate today   - Patient currently intubated for hypoxemic respiratory failure which is likely 2/2 aspiration PNA  - Titrate O2 down as tolerated to prevent hyperoxia  - Patient currently on CPAP trial.  - No dialyzable fluid visualized on US  - PNA treatment as below    # Renal:  - triley replaced today; plan for dialysis today   - Patient pending permacath placement by IR once afebrile   - Appreciate renal recommendations    # ID: concern for aspiration PNA with blood culture and sputum culture prelim reports of staph aureus  - afebrile; repeat blood cultures today   - patient has staph aureus pneumonia; pan sensitive, will d/c vanc zosyn and start nafcillin   - F/U final report of blood and sputum cx and sensitivity.    # GI:  - continue feeds via NG, Nepro @ 40cc/hr x 24 hrs to provide 960cc fluids recommended by nutrition  - GI ppx with protonix 40mg daily    # Endocrine:  - Continue tube feeds  - Continue with 7U Lantus and FS q6h with low SSI.    # Heme:  - Will continue to trend H/H  - Currently holding Herceptin    # Skin:  - No sacral decubiti or signs of infection    # DVT ppx:  - HSC 5000 units Q8 hours    #Goals of Care  - will discuss need for trach and peg with patient's family 2/2 continued concern for aspiration

## 2018-02-24 NOTE — PROGRESS NOTE ADULT - SUBJECTIVE AND OBJECTIVE BOX
Diabetes Follow up note:  Interval Hx:  67 year old female w/uncontrolled T2DM w/complications here w/Flu c/b PEA arrest and Acute respiratory failure. Pt seen in MICU. Intubated. Pt s/p HD today. Tube feeds off when seen at bedside for unknown reason. Last glucose was 98 at lunch but never retested. Requesting RN to test sugar at this time. Pt able to open eyes and squeeze my hand upon request.     Review of Systems:  Unable to provide ROS    MEDS:    insulin glargine Injectable (LANTUS) 12 Unit(s) SubCutaneous at bedtime  insulin lispro (HumaLOG) corrective regimen sliding scale   SubCutaneous every 6 hours    nafcillin  IVPB      nafcillin  IVPB 1 Gram(s) IV Intermittent once  nafcillin  IVPB 1 Gram(s) IV Intermittent every 4 hours    Allergies    doxycycline (Rash)  isoniazid (Rash)  NIFEdipine (Urticaria; Hives)  vitamin E (Short breath; Urticaria; Hives)        PE:  General: Female lying in bed. Intubated.   Vital Signs Last 24 Hrs  T(C): 36.6 (24 Feb 2018 13:45), Max: 37.8 (24 Feb 2018 04:00)  T(F): 97.8 (24 Feb 2018 13:45), Max: 100 (24 Feb 2018 04:00)  HR: 89 (24 Feb 2018 13:45) (77 - 98)  BP: 147/65 (24 Feb 2018 13:45) (95/45 - 178/70)  BP(mean): 76 (24 Feb 2018 12:00) (65 - 101)  RR: 26 (24 Feb 2018 13:45) (15 - 32)  SpO2: 99% (24 Feb 2018 13:45) (90% - 100%)  Abd: Soft, NT,ND, NGT in place  CV: S1, S2. NSR on monitor  Extremities: Warm. + UE/LE edema  Neuro: Opens eyes. Follows commands.     LABS:    POCT Blood Glucose.: 98 mg/dL (02-24-18 @ 11:33)  POCT Blood Glucose.: 172 mg/dL (02-24-18 @ 05:11)  POCT Blood Glucose.: 199 mg/dL (02-23-18 @ 23:40)  POCT Blood Glucose.: 160 mg/dL (02-23-18 @ 17:43)  POCT Blood Glucose.: 161 mg/dL (02-23-18 @ 12:26)  POCT Blood Glucose.: 234 mg/dL (02-23-18 @ 06:46)  POCT Blood Glucose.: 168 mg/dL (02-22-18 @ 21:40)  POCT Blood Glucose.: 141 mg/dL (02-22-18 @ 17:40)  POCT Blood Glucose.: 97 mg/dL (02-22-18 @ 11:43)  POCT Blood Glucose.: 191 mg/dL (02-22-18 @ 05:38)  POCT Blood Glucose.: 167 mg/dL (02-21-18 @ 17:10)                            7.4    8.9   )-----------( 223      ( 24 Feb 2018 08:56 )             23.4       02-24    135  |  94<L>  |  49<H>  ----------------------------<  220<H>  3.9   |  23  |  3.91<H>    Ca    8.6      24 Feb 2018 00:00  Phos  6.5     02-24  Mg     2.5     02-24    TPro  6.4  /  Alb  2.2<L>  /  TBili  0.4  /  DBili  x   /  AST  74<H>  /  ALT  70<H>  /  AlkPhos  345<H>  02-24      Hemoglobin A1C, Whole Blood: 8.6 % <H> [4.0 - 5.6] (01-31-18 @ 07:15)  Hemoglobin A1C, Whole Blood: 8.7 % <H> [4.0 - 5.6] (01-31-18 @ 05:51)            Contact number: marcelino [phone] or [phone]

## 2018-02-24 NOTE — PROGRESS NOTE ADULT - PROBLEM SELECTOR PLAN 1
-test BG Q6h  -Can reduce Lantus 10 units tonight  -c/w Humalog low correction scale Q6h  -not requiring high amounts of insulin at this time  -will continue to monitor  pager: [phone]

## 2018-02-24 NOTE — PROCEDURE NOTE - NSPOSTCAREGUIDE_GEN_A_CORE
Instructed patient/caregiver regarding signs and symptoms of infection Instructed patient/caregiver to follow-up with primary care physician/Instructed patient/caregiver regarding signs and symptoms of infection Instructed patient/caregiver regarding signs and symptoms of infection/Care for catheter as per unit/ICU protocols

## 2018-02-24 NOTE — PROCEDURE NOTE - NSPROCDETAILS_GEN_ALL_CORE
guidewire recovered sterile dressing applied/guidewire recovered/lumen(s) aspirated and flushed/sterile technique, catheter placed/ultrasound guidance

## 2018-02-24 NOTE — PROCEDURE NOTE - NSPOSTPRCRAD_GEN_A_CORE
central line located in the no pneumothorax/central line located in the/central line located in the superior vena cava

## 2018-02-24 NOTE — PROGRESS NOTE ADULT - SUBJECTIVE AND OBJECTIVE BOX
10/21/2022              Laura Funk        [address]        [address] [address]         To Whom It May Concern,   Laura Good had Physical appointment today 10/21/22. Sincerely,      MD Tegan Quiñones Alan , [address] [place] [address]  [phone]        Document electronically generated by:   Angelina Brewer CHIEF COMPLAINT: 67y old woman who presents with a chief complaint of tachypnea with voluminous secretion and respiratory distress    Interval Events: Shiley replaced this morning. Afebrile overnight. Currently doing well on CPAP. Restarted feeding.    REVIEW OF SYSTEMS:  Constitutional: [X ] negative [ ] fevers [ ] chills [ ] weight loss [ ] weight gain  HEENT: [ ] negative [ ] dry eyes [ ] eye irritation [ ] postnasal drip [ ] nasal congestion  CV: [X ] negative  [ ] chest pain [ ] orthopnea [ ] palpitations [ ] murmur  Resp: [X ] negative [ ] cough [ ] shortness of breath [ ] dyspnea [ ] wheezing [ ] sputum [ ] hemoptysis  GI: [ ] negative [ ] nausea [ ] vomiting [ ] diarrhea [ ] constipation [ ] abd pain [ ] dysphagia   : [ ] negative [ ] dysuria [ ] nocturia [ ] hematuria [ ] increased urinary frequency  Musculoskeletal: [ ] negative [ ] back pain [ ] myalgias [ ] arthralgias [ ] fracture  Skin: [ ] negative [ ] rash [ ] itch  Neurological: [ ] negative [ ] headache [ ] dizziness [ ] syncope [ ] weakness [ ] numbness  Psychiatric: [ ] negative [ ] anxiety [ ] depression  Endocrine: [ ] negative [ ] diabetes [ ] thyroid problem  Hematologic/Lymphatic: [ ] negative [ ] anemia [ ] bleeding problem  Allergic/Immunologic: [ ] negative [ ] itchy eyes [ ] nasal discharge [ ] hives [ ] angioedema  [ X] All other systems negative    OBJECTIVE:  ICU Vital Signs Last 24 Hrs  T(C): 36.6 (24 Feb 2018 13:45), Max: 37.8 (24 Feb 2018 04:00)  T(F): 97.8 (24 Feb 2018 13:45), Max: 100 (24 Feb 2018 04:00)  HR: 89 (24 Feb 2018 13:45) (77 - 98)  BP: 147/65 (24 Feb 2018 13:45) (95/45 - 178/70)  BP(mean): 76 (24 Feb 2018 12:00) (65 - 101)  ABP: --  ABP(mean): --  RR: 26 (24 Feb 2018 13:45) (14 - 32)  SpO2: 99% (24 Feb 2018 13:45) (90% - 100%)    Mode: CPAP with PS, FiO2: 40, PEEP: 5, PS: 10, MAP: 8, PIP: 15    02-23 @ 07:01 - 02-24 @ 07:00  --------------------------------------------------------  IN: 692 mL / OUT: 0 mL / NET: 692 mL    02-24 @ 07:01 - 02-24 @ 14:21  --------------------------------------------------------  IN: 25 mL / OUT: 1700 mL / NET: -1675 mL      CAPILLARY BLOOD GLUCOSE      POCT Blood Glucose.: 98 mg/dL (24 Feb 2018 11:33)      PHYSICAL EXAM:  General: elderly woman in no acute distress  HEENT: NC/AT; PERRL, clear conjunctiva  Respiratory: intubated, no wheezes or ronchi  Cardiovascular: +S1/S2; RRR  Abdomen: soft, NT/ND  Extremities: warm to touch, no edema, peripheral pulses intact 2+  Skin: macular rash on arms and chest, RIJ in place, peripheral lines in place with no evidence of infiltration  Neurological: arousable but not following commands    LINES: [facility] MEDICATIONS:  Standing Meds:  aspirin  chewable 81 milliGRAM(s) Oral daily  dextrose 5%. 1000 milliLiter(s) IV Continuous <Continuous>  dextrose 5%. 1000 milliLiter(s) IV Continuous <Continuous>  dextrose 5%. 1000 milliLiter(s) IV Continuous <Continuous>  dextrose 50% Injectable 12.5 Gram(s) IV Push once  dextrose 50% Injectable 25 Gram(s) IV Push once  dextrose 50% Injectable 25 Gram(s) IV Push once  epoetin odalis Injectable 4000 Unit(s) IV Push <User Schedule>  heparin  Injectable 5000 Unit(s) SubCutaneous every 8 hours  insulin glargine Injectable (LANTUS) 12 Unit(s) SubCutaneous at bedtime  insulin lispro (HumaLOG) corrective regimen sliding scale   SubCutaneous every 6 hours  nystatin Powder 1 Application(s) Topical two times a day  pantoprazole  Injectable 40 milliGRAM(s) IV Push daily  triamcinolone 0.1% Ointment 1 Application(s) Topical two times a day      PRN Meds:  chlorhexidine 0.12% Liquid 15 milliLiter(s) Swish and Spit every 4 hours PRN  dextrose Gel 1 Dose(s) Oral once PRN  glucagon  Injectable 1 milliGRAM(s) IntraMuscular once PRN  hydrocortisone 1% Ointment 1 Application(s) Topical every 8 hours PRN      LABS:                        7.4    8.9   )-----------( 223      ( 24 Feb 2018 08:56 )             23.4     Hgb Trend: 7.4<--, 7.4<--, 8.4<--, 8.2<--, 9.2<--  02-24    135  |  94<L>  |  49<H>  ----------------------------<  220<H>  3.9   |  23  |  3.91<H>    Ca    8.6      24 Feb 2018 00:00  Phos  6.5     02-24  Mg     2.5     02-24    TPro  6.4  /  Alb  2.2<L>  /  TBili  0.4  /  DBili  x   /  AST  74<H>  /  ALT  70<H>  /  AlkPhos  345<H>  02-24    Creatinine Trend: 3.91<--, 2.57<--, 3.71<--, 3.35<--, 3.08<--, 2.85<--  PT/INR - ( 24 Feb 2018 00:00 )   PT: 10.8 sec;   INR: 1.00 ratio         PTT - ( 24 Feb 2018 00:00 )  PTT:31.4 sec    Arterial Blood Gas:  02-23 @ 09:19  7.32/51/192/26/100/.1  ABG lactate: --  Arterial Blood Gas:  02-23 @ 07:31  7.32/53/138/27/99/1.1  ABG lactate: --    Venous Blood Gas:  02-24 @ 07:34  7.28/53/57/25/89  VBG Lactate: 0.8      MICROBIOLOGY:     Culture - Sputum (collected 22 Feb 2018 07:29)  Source: .Sputum Sputum trap  Gram Stain (23 Feb 2018 14:33):    Numerous polymorphonuclear leukocytes per low power field    Rare Squamous epithelial cells per low power field    Moderate Gram positive cocci in pairs Gram Positive Cocci in Clusters    per oil power field  Final Report (24 Feb 2018 11:09):    Numerous Staphylococcus aureus ***********Note************    This isolate demonstrates inducible    clindamycin resistance.    Clindamycin may still be effective in some patients.  Organism: Staphylococcus aureus (24 Feb 2018 11:09)  Organism: Staphylococcus aureus (24 Feb 2018 11:09)    Culture - Blood (collected 22 Feb 2018 02:22)  Source: .Blood Blood  Gram Stain (23 Feb 2018 01:53):    Growth in anaerobic bottle:    Gram Positive Cocci in Clusters  Preliminary Report (23 Feb 2018 21:49):    Growth in anaerobic bottle: Staphylococcus aureus    "Due to technical problems, Proteus sp. will Not be reported as part of    the BCID panel until further notice"    ***Blood Panel PCR results on this specimen are available    approximately 3 hours afterthe Gram stain result.***    Gram stain, PCR, and/or culture results may not always    correspond due to difference in methodologies.    ************************************************************    This PCR assay was performed using [employer].    The following targets are tested for: Enterococcus,    vancomycin resistant enterococci, Listeria monocytogenes,    coagulase negative staphylococci, S. aureus,    methicillin resistant S. aureus, Streptococcus agalactiae    (Group B), S. pneumoniae, S. pyogenes (Group A),    Acinetobacter baumannii, Enterobacter cloacae, E. coli,    Klebsiella oxytoca, K. pneumoniae, Proteus sp.,    Serratia marcescens, Haemophilus influenzae,    Neisseria meningitidis, Pseudomonas aeruginosa, Candida    albicans, C. glabrata, C krusei, C parapsilosis,    C. tropicalis and the KPC resistance gene.  Organism: Blood Culture PCR (23 Feb 2018 04:34)  Organism: Blood Culture PCR (23 Feb 2018 04:34)    Culture - Blood (collected 22 Feb 2018 00:54)  Source: .Blood Blood  Preliminary Report (23 Feb 2018 01:02):    No growth to date.      RADIOLOGY:  [ ] Reviewed and interpreted by me    EKG:

## 2018-02-24 NOTE — PROGRESS NOTE ADULT - ATTENDING COMMENTS
I have seen this patient with the fellow and agree with their assessment and plan. In addition, 67yoF with breast cancer, HTN who p/w flu, went into respiratory arrest, then PEA arrest, s/p chest tube, TPA and intubation. Course c/b shock liver, RUSS, pneumoperitoneum, and elevated cardiac enzymes.   Seen on HD, tolerating well except unable to tolerate increased fluid removal, decrease to 1.2kg  Monitor BP  No UO yet, still anuric  Prognosis is guarded    Suzie Salas MD  Cell   Pager   Office

## 2018-02-24 NOTE — PROGRESS NOTE ADULT - ATTENDING COMMENTS
I have seen and examined the patient. I agree with the above history, physical exam, and plan of care except for as detailed below.    68 y/o F w/breast cancer admitted for influenza infection w/complicated hospital course including multiple cardiac arrests now in MICU for acute hypoxemic respiratory failure secondary to aspiration PNA.    - Continue abx  - Replace dialysis catheter today  - Nafcillin for staph bacteremia/PNA, d/c vanc/zosyn  - GOC discussions    Attending critical care time 40 minutes

## 2018-02-24 NOTE — PROGRESS NOTE ADULT - SUBJECTIVE AND OBJECTIVE BOX
[facility] DIVISION OF KIDNEY DISEASES AND HYPERTENSION -- FOLLOW UP NOTE  --------------------------------------------------------------------------------  Chief Complaint:  renal failure    24 hour events/subjective:  Tolerating CPAP trial well. Not on pressors or sedation. Family at bedside.      PAST HISTORY  --------------------------------------------------------------------------------  No significant changes to PMH, PSH, FHx, SHx, unless otherwise noted    ALLERGIES & MEDICATIONS  --------------------------------------------------------------------------------  Allergies    doxycycline (Rash)  isoniazid (Rash)  NIFEdipine (Urticaria; Hives)  vitamin E (Short breath; Urticaria; Hives)    Intolerances      Standing Inpatient Medications  aspirin  chewable 81 milliGRAM(s) Oral daily  dextrose 5%. 1000 milliLiter(s) IV Continuous <Continuous>  dextrose 5%. 1000 milliLiter(s) IV Continuous <Continuous>  dextrose 5%. 1000 milliLiter(s) IV Continuous <Continuous>  dextrose 50% Injectable 12.5 Gram(s) IV Push once  dextrose 50% Injectable 25 Gram(s) IV Push once  dextrose 50% Injectable 25 Gram(s) IV Push once  epoetin odalis Injectable 4000 Unit(s) IV Push <User Schedule>  heparin  Injectable 5000 Unit(s) SubCutaneous every 8 hours  insulin glargine Injectable (LANTUS) 12 Unit(s) SubCutaneous at bedtime  insulin lispro (HumaLOG) corrective regimen sliding scale   SubCutaneous every 6 hours  nystatin Powder 1 Application(s) Topical two times a day  pantoprazole  Injectable 40 milliGRAM(s) IV Push daily  piperacillin/tazobactam IVPB. 3.375 Gram(s) IV Intermittent every 12 hours  triamcinolone 0.1% Ointment 1 Application(s) Topical two times a day    PRN Inpatient Medications  chlorhexidine 0.12% Liquid 15 milliLiter(s) Swish and Spit every 4 hours PRN  dextrose Gel 1 Dose(s) Oral once PRN  glucagon  Injectable 1 milliGRAM(s) IntraMuscular once PRN  hydrocortisone 1% Ointment 1 Application(s) Topical every 8 hours PRN      REVIEW OF SYSTEMS  --------------------------------------------------------------------------------  Gen: denies any pain    VITALS/PHYSICAL EXAM  --------------------------------------------------------------------------------  T(C): 37.2 (02-24-18 @ 08:00), Max: 37.8 (02-24-18 @ 04:00)  HR: 92 (02-24-18 @ 10:00) (77 - 96)  BP: 144/62 (02-24-18 @ 10:00) (113/57 - 178/70)  RR: 31 (02-24-18 @ 10:00) (12 - 31)  SpO2: 98% (02-24-18 @ 10:00) (97% - 100%)  Wt(kg): --        02-23-18 @ 07:01  -  02-24-18 @ 07:00  --------------------------------------------------------  IN: 692 mL / OUT: 0 mL / NET: 692 mL    02-24-18 @ 07:01  -  02-24-18 @ 10:39  --------------------------------------------------------  IN: 25 mL / OUT: 0 mL / NET: 25 mL      Physical Exam:  	Gen: NAD, female  	HEENT: +ET tube  	Pulm: CTA B/L  	CV: RRR, S1S2; no rub  	Abd: +BS, soft, nontender/nondistended  	UE: Warm, no edema  	LE: Warm, no edema  	Neuro: awake, alert, nods to some questions  	Skin: Warm, without rashes  	Vascular access: SHAKIRA non-tunneled HD catheter - site c/d/i    LABS/STUDIES  --------------------------------------------------------------------------------              7.4    8.9   >-----------<  223      [02-24-18 @ 08:56]              23.4     135  |  94  |  49  ----------------------------<  220      [02-24-18 @ 00:00]  3.9   |  23  |  3.91        Ca     8.6     [02-24-18 @ 00:00]      Mg     2.5     [02-24-18 @ 00:00]      Phos  6.5     [02-24-18 @ 00:00]    TPro  6.4  /  Alb  2.2  /  TBili  0.4  /  DBili  x   /  AST  74  /  ALT  70  /  AlkPhos  345  [02-24-18 @ 00:00]    PT/INR: PT 10.8 , INR 1.00       [02-24-18 @ 00:00]  PTT: 31.4       [02-24-18 @ 00:00]      Creatinine Trend:  SCr 3.91 [02-24 @ 00:00]  SCr 2.57 [02-22 @ 23:44]  SCr 3.71 [02-22 @ 03:37]  SCr 3.35 [02-21 @ 22:55]  SCr 3.08 [02-21 @ 16:35]    Urinalysis - [01-30-18 @ 12:52]      Color Yellow / Appearance SL Turbid / SG 1.025 / pH 6.0      Gluc 50 / Ketone Negative  / Bili Negative / Urobili Negative       Blood Trace / Protein 150 / Leuk Est Negative / Nitrite Negative      RBC 3-5 / WBC  / Hyaline  / Gran  / Sq Epi  / Non Sq Epi OCC / Bacteria       HbA1c 8.6      [01-31-18 @ 07:15]  Lipid: chol 183, , HDL 10,       [02-07-18 @ 13:34]    HBsAg Nonreact      [02-01-18 @ 22:49]  HCV 0.16, Nonreact      [02-01-18 @ 22:49]

## 2018-02-24 NOTE — PROGRESS NOTE ADULT - ASSESSMENT
66 y/o F with T2DM uncontrolled on insulin, osteoporosis, HTN, here with acute respiratory failure 2/2 influenza s/p PEA arrest x 2. BG values at goal while on tube feeds but coming down now with tube feeds off. BG goal (140-180mg/dl).

## 2018-02-24 NOTE — PROGRESS NOTE ADULT - PROBLEM SELECTOR PLAN 1
likely from ATN in setting of cardiac arrest. Pt continues to be oligo-anuric, requiring dialysis. Please remove non tunneled catheter and replace with tunneled HD catheter. Planned for HD today with 1-2kg UF. Monitor for any UO.

## 2018-02-25 LAB
ALBUMIN SERPL ELPH-MCNC: 2 G/DL — LOW (ref 3.3–5)
ALP SERPL-CCNC: 342 U/L — HIGH (ref 40–120)
ALT FLD-CCNC: 54 U/L RC — HIGH (ref 10–45)
ANION GAP SERPL CALC-SCNC: 15 MMOL/L — SIGNIFICANT CHANGE UP (ref 5–17)
APTT BLD: 27.2 SEC — LOW (ref 27.5–37.4)
AST SERPL-CCNC: 35 U/L — SIGNIFICANT CHANGE UP (ref 10–40)
BILIRUB SERPL-MCNC: 0.5 MG/DL — SIGNIFICANT CHANGE UP (ref 0.2–1.2)
BUN SERPL-MCNC: 49 MG/DL — HIGH (ref 7–23)
CALCIUM SERPL-MCNC: 8.6 MG/DL — SIGNIFICANT CHANGE UP (ref 8.4–10.5)
CHLORIDE SERPL-SCNC: 96 MMOL/L — SIGNIFICANT CHANGE UP (ref 96–108)
CO2 SERPL-SCNC: 24 MMOL/L — SIGNIFICANT CHANGE UP (ref 22–31)
CREAT SERPL-MCNC: 4.07 MG/DL — HIGH (ref 0.5–1.3)
GLUCOSE BLDC GLUCOMTR-MCNC: 250 MG/DL — HIGH (ref 70–99)
GLUCOSE BLDC GLUCOMTR-MCNC: 269 MG/DL — HIGH (ref 70–99)
GLUCOSE BLDC GLUCOMTR-MCNC: 333 MG/DL — HIGH (ref 70–99)
GLUCOSE SERPL-MCNC: 283 MG/DL — HIGH (ref 70–99)
HCT VFR BLD CALC: 21.9 % — LOW (ref 34.5–45)
HGB BLD-MCNC: 7.1 G/DL — LOW (ref 11.5–15.5)
INR BLD: 0.99 RATIO — SIGNIFICANT CHANGE UP (ref 0.88–1.16)
MAGNESIUM SERPL-MCNC: 2.4 MG/DL — SIGNIFICANT CHANGE UP (ref 1.6–2.6)
MCHC RBC-ENTMCNC: 31.6 PG — SIGNIFICANT CHANGE UP (ref 27–34)
MCHC RBC-ENTMCNC: 32.6 GM/DL — SIGNIFICANT CHANGE UP (ref 32–36)
MCV RBC AUTO: 97.2 FL — SIGNIFICANT CHANGE UP (ref 80–100)
PHOSPHATE SERPL-MCNC: 5.6 MG/DL — HIGH (ref 2.5–4.5)
PLATELET # BLD AUTO: 237 K/UL — SIGNIFICANT CHANGE UP (ref 150–400)
POTASSIUM SERPL-MCNC: 3.9 MMOL/L — SIGNIFICANT CHANGE UP (ref 3.5–5.3)
POTASSIUM SERPL-SCNC: 3.9 MMOL/L — SIGNIFICANT CHANGE UP (ref 3.5–5.3)
PROT SERPL-MCNC: 6.3 G/DL — SIGNIFICANT CHANGE UP (ref 6–8.3)
PROTHROM AB SERPL-ACNC: 10.7 SEC — SIGNIFICANT CHANGE UP (ref 9.8–12.7)
RBC # BLD: 2.25 M/UL — LOW (ref 3.8–5.2)
RBC # FLD: 15.5 % — HIGH (ref 10.3–14.5)
SODIUM SERPL-SCNC: 135 MMOL/L — SIGNIFICANT CHANGE UP (ref 135–145)
WBC # BLD: 5.6 K/UL — SIGNIFICANT CHANGE UP (ref 3.8–10.5)
WBC # FLD AUTO: 5.6 K/UL — SIGNIFICANT CHANGE UP (ref 3.8–10.5)

## 2018-02-25 PROCEDURE — 99291 CRITICAL CARE FIRST HOUR: CPT

## 2018-02-25 PROCEDURE — 99233 SBSQ HOSP IP/OBS HIGH 50: CPT | Mod: GC

## 2018-02-25 PROCEDURE — 99232 SBSQ HOSP IP/OBS MODERATE 35: CPT

## 2018-02-25 RX ORDER — SODIUM CHLORIDE 9 MG/ML
1000 INJECTION, SOLUTION INTRAVENOUS
Qty: 0 | Refills: 0 | Status: DISCONTINUED | OUTPATIENT
Start: 2018-02-25 | End: 2018-03-16

## 2018-02-25 RX ORDER — DEXTROSE 50 % IN WATER 50 %
12.5 SYRINGE (ML) INTRAVENOUS ONCE
Qty: 0 | Refills: 0 | Status: DISCONTINUED | OUTPATIENT
Start: 2018-02-25 | End: 2018-02-25

## 2018-02-25 RX ORDER — DIPHENHYDRAMINE HCL 50 MG
25 CAPSULE ORAL ONCE
Qty: 0 | Refills: 0 | Status: COMPLETED | OUTPATIENT
Start: 2018-02-25 | End: 2018-02-25

## 2018-02-25 RX ORDER — INSULIN LISPRO 100/ML
VIAL (ML) SUBCUTANEOUS
Qty: 0 | Refills: 0 | Status: DISCONTINUED | OUTPATIENT
Start: 2018-02-25 | End: 2018-02-25

## 2018-02-25 RX ORDER — DEXTROSE 50 % IN WATER 50 %
25 SYRINGE (ML) INTRAVENOUS ONCE
Qty: 0 | Refills: 0 | Status: DISCONTINUED | OUTPATIENT
Start: 2018-02-25 | End: 2018-02-25

## 2018-02-25 RX ORDER — INSULIN LISPRO 100/ML
VIAL (ML) SUBCUTANEOUS EVERY 6 HOURS
Qty: 0 | Refills: 0 | Status: DISCONTINUED | OUTPATIENT
Start: 2018-02-25 | End: 2018-03-17

## 2018-02-25 RX ORDER — DEXTROSE 50 % IN WATER 50 %
1 SYRINGE (ML) INTRAVENOUS ONCE
Qty: 0 | Refills: 0 | Status: DISCONTINUED | OUTPATIENT
Start: 2018-02-25 | End: 2018-02-25

## 2018-02-25 RX ORDER — INSULIN GLARGINE 100 [IU]/ML
15 INJECTION, SOLUTION SUBCUTANEOUS AT BEDTIME
Qty: 0 | Refills: 0 | Status: DISCONTINUED | OUTPATIENT
Start: 2018-02-25 | End: 2018-02-26

## 2018-02-25 RX ORDER — GLUCAGON INJECTION, SOLUTION 0.5 MG/.1ML
1 INJECTION, SOLUTION SUBCUTANEOUS ONCE
Qty: 0 | Refills: 0 | Status: DISCONTINUED | OUTPATIENT
Start: 2018-02-25 | End: 2018-02-25

## 2018-02-25 RX ADMIN — Medication 1 APPLICATION(S): at 17:27

## 2018-02-25 RX ADMIN — NAFCILLIN 100 GRAM(S): 10 INJECTION, POWDER, FOR SOLUTION INTRAVENOUS at 05:02

## 2018-02-25 RX ADMIN — NAFCILLIN 100 GRAM(S): 10 INJECTION, POWDER, FOR SOLUTION INTRAVENOUS at 21:17

## 2018-02-25 RX ADMIN — HEPARIN SODIUM 5000 UNIT(S): 5000 INJECTION INTRAVENOUS; SUBCUTANEOUS at 13:30

## 2018-02-25 RX ADMIN — Medication 1: at 12:05

## 2018-02-25 RX ADMIN — Medication 81 MILLIGRAM(S): at 11:57

## 2018-02-25 RX ADMIN — NAFCILLIN 100 GRAM(S): 10 INJECTION, POWDER, FOR SOLUTION INTRAVENOUS at 01:04

## 2018-02-25 RX ADMIN — NAFCILLIN 100 GRAM(S): 10 INJECTION, POWDER, FOR SOLUTION INTRAVENOUS at 10:32

## 2018-02-25 RX ADMIN — INSULIN GLARGINE 15 UNIT(S): 100 INJECTION, SOLUTION SUBCUTANEOUS at 23:10

## 2018-02-25 RX ADMIN — Medication 8: at 17:33

## 2018-02-25 RX ADMIN — Medication 1 APPLICATION(S): at 05:07

## 2018-02-25 RX ADMIN — NAFCILLIN 100 GRAM(S): 10 INJECTION, POWDER, FOR SOLUTION INTRAVENOUS at 17:26

## 2018-02-25 RX ADMIN — NAFCILLIN 100 GRAM(S): 10 INJECTION, POWDER, FOR SOLUTION INTRAVENOUS at 13:30

## 2018-02-25 RX ADMIN — Medication 20 MILLIGRAM(S): at 11:57

## 2018-02-25 RX ADMIN — Medication 6: at 23:10

## 2018-02-25 RX ADMIN — NYSTATIN CREAM 1 APPLICATION(S): 100000 CREAM TOPICAL at 05:07

## 2018-02-25 RX ADMIN — HEPARIN SODIUM 5000 UNIT(S): 5000 INJECTION INTRAVENOUS; SUBCUTANEOUS at 05:02

## 2018-02-25 RX ADMIN — Medication 25 MILLIGRAM(S): at 06:04

## 2018-02-25 RX ADMIN — Medication 2: at 05:07

## 2018-02-25 RX ADMIN — HEPARIN SODIUM 5000 UNIT(S): 5000 INJECTION INTRAVENOUS; SUBCUTANEOUS at 21:17

## 2018-02-25 RX ADMIN — NYSTATIN CREAM 1 APPLICATION(S): 100000 CREAM TOPICAL at 17:26

## 2018-02-25 RX ADMIN — PANTOPRAZOLE SODIUM 40 MILLIGRAM(S): 20 TABLET, DELAYED RELEASE ORAL at 11:57

## 2018-02-25 NOTE — PROGRESS NOTE ADULT - ATTENDING COMMENTS
I have seen this patient with the fellow and agree with their assessment and plan. In addition, next HD tomorrow  No urgent indication today for HD or UF    Suzie Salas MD  Cell   Pager   Office

## 2018-02-25 NOTE — PROGRESS NOTE ADULT - SUBJECTIVE AND OBJECTIVE BOX
[facility] DIVISION OF KIDNEY DISEASES AND HYPERTENSION -- FOLLOW UP NOTE  --------------------------------------------------------------------------------  Chief Complaint:  renal failure    24 hour events/subjective:  Tolerated HD well yesterday. Started on steroids today for body rash. Tolerating CPAP trial this AM.       PAST HISTORY  --------------------------------------------------------------------------------  No significant changes to PMH, PSH, FHx, SHx, unless otherwise noted    ALLERGIES & MEDICATIONS  --------------------------------------------------------------------------------  Allergies    doxycycline (Rash)  isoniazid (Rash)  NIFEdipine (Urticaria; Hives)  vitamin E (Short breath; Urticaria; Hives)    Intolerances    Standing Inpatient Medications  aspirin  chewable 81 milliGRAM(s) Oral daily  dextrose 5%. 1000 milliLiter(s) IV Continuous <Continuous>  dextrose 5%. 1000 milliLiter(s) IV Continuous <Continuous>  dextrose 5%. 1000 milliLiter(s) IV Continuous <Continuous>  dextrose 50% Injectable 12.5 Gram(s) IV Push once  dextrose 50% Injectable 25 Gram(s) IV Push once  dextrose 50% Injectable 25 Gram(s) IV Push once  epoetin odalis Injectable 4000 Unit(s) IV Push <User Schedule>  heparin  Injectable 5000 Unit(s) SubCutaneous every 8 hours  insulin glargine Injectable (LANTUS) 10 Unit(s) SubCutaneous at bedtime  insulin lispro (HumaLOG) corrective regimen sliding scale   SubCutaneous every 6 hours  nafcillin  IVPB      nafcillin  IVPB 1 Gram(s) IV Intermittent every 4 hours  nystatin Powder 1 Application(s) Topical two times a day  pantoprazole  Injectable 40 milliGRAM(s) IV Push daily  predniSONE  Solution 20 milliGRAM(s) Oral daily  triamcinolone 0.1% Ointment 1 Application(s) Topical two times a day    PRN Inpatient Medications  chlorhexidine 0.12% Liquid 15 milliLiter(s) Swish and Spit every 4 hours PRN  dextrose Gel 1 Dose(s) Oral once PRN  glucagon  Injectable 1 milliGRAM(s) IntraMuscular once PRN  hydrocortisone 1% Ointment 1 Application(s) Topical every 8 hours PRN      REVIEW OF SYSTEMS  --------------------------------------------------------------------------------  Gen: no pain    VITALS/PHYSICAL EXAM  --------------------------------------------------------------------------------  T(C): 37.5 (02-25-18 @ 08:00), Max: 37.5 (02-25-18 @ 08:00)  HR: 88 (02-25-18 @ 11:00) (75 - 92)  BP: 153/67 (02-25-18 @ 11:00) (107/52 - 156/67)  RR: 85 (02-25-18 @ 11:00) (16 - 85)  SpO2: 100% (02-25-18 @ 11:00) (91% - 100%)  Wt(kg): --        02-24-18 @ 07:01  -  02-25-18 @ 07:00  --------------------------------------------------------  IN: 1055 mL / OUT: 1700 mL / NET: -645 mL    02-25-18 @ 07:01  -  02-25-18 @ 11:12  --------------------------------------------------------  IN: 120 mL / OUT: 0 mL / NET: 120 mL      Physical Exam:  	Gen: NAD, female  	HEENT: +ET tube  	Pulm: CTA B/L  	CV: RRR, S1S2; no rub  	Abd: +BS, soft, nontender/nondistended  	UE: Warm, no edema  	LE: Warm, no edema  	Neuro: awake, alert, nods to some questions  	Skin: Warm, LE rash  	Vascular access: RIJ non-tunneled HD catheter - site c/d/i    LABS/STUDIES  --------------------------------------------------------------------------------              7.2    7.6   >-----------<  219      [02-24-18 @ 23:24]              21.8     139  |  98  |  31  ----------------------------<  206      [02-24-18 @ 23:24]  3.9   |  27  |  2.73        Ca     8.4     [02-24-18 @ 23:24]      Mg     2.1     [02-24-18 @ 23:24]      Phos  4.8     [02-24-18 @ 23:24]    TPro  6.7  /  Alb  2.1  /  TBili  0.8  /  DBili  x   /  AST  44  /  ALT  63  /  AlkPhos  323  [02-24-18 @ 23:24]    PT/INR: PT 11.3 , INR 1.04       [02-24-18 @ 23:24]  PTT: 38.0       [02-24-18 @ 23:24]      Creatinine Trend:  SCr 2.73 [02-24 @ 23:24]  SCr 3.91 [02-24 @ 00:00]  SCr 2.57 [02-22 @ 23:44]  SCr 3.71 [02-22 @ 03:37]  SCr 3.35 [02-21 @ 22:55]    Urinalysis - [01-30-18 @ 12:52]      Color Yellow / Appearance SL Turbid / SG 1.025 / pH 6.0      Gluc 50 / Ketone Negative  / Bili Negative / Urobili Negative       Blood Trace / Protein 150 / Leuk Est Negative / Nitrite Negative      RBC 3-5 / WBC  / Hyaline  / Gran  / Sq Epi  / Non Sq Epi OCC / Bacteria       HbA1c 8.6      [01-31-18 @ 07:15]  Lipid: chol 183, , HDL 10,       [02-07-18 @ 13:34]    HBsAg Nonreact      [02-01-18 @ 22:49]  HCV 0.16, Nonreact      [02-01-18 @ 22:49]

## 2018-02-25 NOTE — PROGRESS NOTE ADULT - ASSESSMENT
68 y/o F with T2DM uncontrolled on insulin, osteoporosis, HTN, here with acute respiratory failure 2/2 influenza s/p PEA arrest x 2. Pt on continuous tube feeds now started on prednisone. Variable glycemic control on present insulin regimen. Would increase Lantus and modify scale while on tube feeds to improve glycemic control. May require NPH q6h to maintain glucose control in setting of steroids but will observe overnight. BG goal (140-180mg/dl).

## 2018-02-25 NOTE — PROGRESS NOTE ADULT - SUBJECTIVE AND OBJECTIVE BOX
Diabetes Follow up note:  Interval Hx:  67 year old female w/uncontrolled T2DM w/complications here w/Flu c/b PEA arrest and Acute respiratory failure. Pt seen in MICU. Intubated on nepro @ 40 cc/hr. Pt started on Prednisone 20mg daily today for rash. Pt awakes and follows commands appropriately.     Review of Systems:  General: Unable to verbalize complaints-intubated.   GI: Tolerating TFs without any N/V/D/ABD PAIN.  CV: No CP/SOB  ENDO: No S&Sx of hypoglycemia  MEDS:    insulin glargine Injectable (LANTUS) 10 Unit(s) SubCutaneous at bedtime  insulin lispro (HumaLOG) corrective regimen sliding scale   SubCutaneous every 6 hours  predniSONE  Solution 20 milliGRAM(s) Oral daily    nafcillin  IVPB      nafcillin  IVPB 1 Gram(s) IV Intermittent every 4 hours    Allergies    doxycycline (Rash)  isoniazid (Rash)  NIFEdipine (Urticaria; Hives)  vitamin E (Short breath; Urticaria; Hives)        PE:  General: Female lying in bed. Intubated.   Vital Signs Last 24 Hrs  T(C): 36.9 (25 Feb 2018 12:00), Max: 37.5 (25 Feb 2018 08:00)  T(F): 98.5 (25 Feb 2018 12:00), Max: 99.5 (25 Feb 2018 08:00)  HR: 83 (25 Feb 2018 14:00) (75 - 93)  BP: 124/56 (25 Feb 2018 14:00) (111/53 - 156/67)  BP(mean): 81 (25 Feb 2018 14:00) (71 - 97)  RR: 18 (25 Feb 2018 14:00) (16 - 69)  SpO2: 100% (25 Feb 2018 14:00) (91% - 100%)  Abd: Soft, NT,ND, NGT in place  CV: S1, S2. NSR on monitor.   Extremities: Warm. Rash B/L LE and Upper extremities  Neuro: Awake. Follows commands appropriately.     LABS:    POCT Blood Glucose.: 250 mg/dL (02-25-18 @ 05:04)  POCT Blood Glucose.: 199 mg/dL (02-24-18 @ 23:13)  POCT Blood Glucose.: 170 mg/dL (02-24-18 @ 18:31)  POCT Blood Glucose.: 149 mg/dL (02-24-18 @ 15:30)  POCT Blood Glucose.: 98 mg/dL (02-24-18 @ 11:33)  POCT Blood Glucose.: 172 mg/dL (02-24-18 @ 05:11)  POCT Blood Glucose.: 199 mg/dL (02-23-18 @ 23:40)  POCT Blood Glucose.: 160 mg/dL (02-23-18 @ 17:43)  POCT Blood Glucose.: 161 mg/dL (02-23-18 @ 12:26)  POCT Blood Glucose.: 234 mg/dL (02-23-18 @ 06:46)  POCT Blood Glucose.: 168 mg/dL (02-22-18 @ 21:40)  POCT Blood Glucose.: 141 mg/dL (02-22-18 @ 17:40)                            7.2    7.6   )-----------( 219      ( 24 Feb 2018 23:24 )             21.8       02-24    139  |  98  |  31<H>  ----------------------------<  206<H>  3.9   |  27  |  2.73<H>    Ca    8.4      24 Feb 2018 23:24  Phos  4.8     02-24  Mg     2.1     02-24    TPro  6.7  /  Alb  2.1<L>  /  TBili  0.8  /  DBili  x   /  AST  44<H>  /  ALT  63<H>  /  AlkPhos  323<H>  02-24        Hemoglobin A1C, Whole Blood: 8.6 % <H> [4.0 - 5.6] (01-31-18 @ 07:15)  Hemoglobin A1C, Whole Blood: 8.7 % <H> [4.0 - 5.6] (01-31-18 @ 05:51)            Contact number: marcelino [phone] or [phone]

## 2018-02-25 NOTE — PROGRESS NOTE ADULT - ASSESSMENT
67F PMH HTN, DMT2, Breast Cancer (diagnosed in Feb 2017) currently on trastuzumab (last dose Jan 20th), originally presented 1/30 with fever found to have influenza subsequently went into PEA arrest x 2 with ROSC, complicated by bilateral pneumothoraces with pneumomediastinum s/p 3 chest tubes, and also new ESRD requiring almost daily HD. Readmitted to the MICU for hypoxic respiratory failure likely 2/2 aspiration.    # Neuro:  - awake and alert, off sedation    - Known watershed ischemia of the occiput from low flow state during admission 2/2 flu  - Low suspicion for repeat CVA event, no need for CT or MR of the head as of now    # CV:  -off pressors  - No EF derangements noted on POCUS with VTI measurement at 20.2 cm suggestive of normal range SV  - Hemodynamic Analysis: patient on low dose norepi. picture consistent with vasoplegic shock pattern in otherwise normal echo findings. DO2 moderately reduced but appears adequate for physiological function  SV 52, HR 74, CO 3.8L/min   CaO2 11.4/100cc  DO2 340uwP2/min; 6.2mlO2/kg/min    # Pulm:  - patient a candidate for extubation, however, concern for continued aspiration; will not extubate today   - family considering tracheostomy   - Patient currently intubated for hypoxemic respiratory failure which is likely 2/2 aspiration PNA  - Titrate O2 down as tolerated to prevent hyperoxia  - Patient currently on CPAP trial.  - No dialyzable fluid visualized on US  - PNA treatment as below    # Renal:  - dialysis yesterday, 1.5L removed; plan for dialysis tomorrow   - Patient pending permacath placement by IR once afebrile   - Appreciate renal recommendations    # ID: concern for aspiration PNA with blood culture and sputum positive for staph aureus    - afebrile  - repeat blood cultures pending   - patient has staph aureus pneumonia; pan sensitive, completed 5 days of vanc/zosyn; nafcililn day 2     # GI:  - continue feeds via NG, Nepro @ 40cc/hr x 24 hrs to provide 960cc fluids recommended by nutrition  - GI ppx with protonix 40mg daily    # Endocrine:  - Continue tube feeds  - Continue with 10U Lantus and FS q6h with low SSI.    # Heme:  - Will continue to trend H/H  - Currently holding Herceptin    # Skin:  - No sacral decubiti or signs of infection  - macular rash on arms, chest and legs  - started steroid course  - will consult derm if no improvement     # DVT ppx:  - HSC 5000 units Q8 hours    #Goals of Care  - will discuss need for trach and peg with patient's family 2/2 continued concern for aspiration

## 2018-02-25 NOTE — PROGRESS NOTE ADULT - PROBLEM SELECTOR PLAN 1
-test BG Q6h  -Recommend increase Lantus 15 units QHS   -Can increase Humalog correction scale to moderate scale q6h (while on steroids)  -may require NPH-can observe on increased lantus and scale overnight  -discussed w/family and team  pager: [phone]

## 2018-02-25 NOTE — PROGRESS NOTE ADULT - ATTENDING COMMENTS
I have seen and examined the patient. I agree with the above history, physical exam, and plan of care except for as detailed below.    66 y/o F w/breast cancer admitted for influenza infection w/complicated hospital course including multiple cardiac arrests now in MICU for acute hypoxemic respiratory failure secondary to aspiration PNA.    - Continue abx  - Nafcillin for staph bacteremia/PNA  - GOC discussions  - Prednisone for pruritic rash. If no improvement consider derm consult    Attending critical care time 40 minutes

## 2018-02-25 NOTE — PROGRESS NOTE ADULT - SUBJECTIVE AND OBJECTIVE BOX
CHIEF COMPLAINT: 67F readmitted to MICU after large aspiration event       Interval Events: Completed dialysis yesterday, 1.5L removed. No events overnight. Patient continues to complain of pruritic rash. Given IV Benadryl and hydrocortisone cream. No other complaints.      REVIEW OF SYSTEMS:  Constitutional: [X ] negative [ ] fevers [ ] chills [ ] weight loss [ ] weight gain  HEENT: [ ] negative [ ] dry eyes [ ] eye irritation [ ] postnasal drip [ ] nasal congestion  CV: [X ] negative  [ ] chest pain [ ] orthopnea [ ] palpitations [ ] murmur  Resp: [X ] negative [ ] cough [ ] shortness of breath [ ] dyspnea [ ] wheezing [ ] sputum [ ] hemoptysis  GI: [ ] negative [ ] nausea [ ] vomiting [ ] diarrhea [ ] constipation [ ] abd pain [ ] dysphagia   : [ ] negative [ ] dysuria [ ] nocturia [ ] hematuria [ ] increased urinary frequency  Musculoskeletal: [ ] negative [ ] back pain [ ] myalgias [ ] arthralgias [ ] fracture  Skin: [ ] negative [ ] rash [ ] itch  Neurological: [ ] negative [ ] headache [ ] dizziness [ ] syncope [ ] weakness [ ] numbness  Psychiatric: [ ] negative [ ] anxiety [ ] depression  Endocrine: [ ] negative [ ] diabetes [ ] thyroid problem  Hematologic/Lymphatic: [ ] negative [ ] anemia [ ] bleeding problem  Allergic/Immunologic: [ ] negative [ ] itchy eyes [ ] nasal discharge [ ] hives [ ] angioedema  [X ] All other systems negative      OBJECTIVE:  ICU Vital Signs Last 24 Hrs  T(C): 36.9 (25 Feb 2018 12:00), Max: 37.5 (25 Feb 2018 08:00)  T(F): 98.5 (25 Feb 2018 12:00), Max: 99.5 (25 Feb 2018 08:00)  HR: 84 (25 Feb 2018 13:00) (75 - 93)  BP: 132/54 (25 Feb 2018 13:00) (111/53 - 156/67)  BP(mean): 78 (25 Feb 2018 13:00) (71 - 97)  ABP: --  ABP(mean): --  RR: 16 (25 Feb 2018 13:00) (16 - 69)  SpO2: 100% (25 Feb 2018 13:00) (91% - 100%)    Mode: CPAP with PS, FiO2: 40, PEEP: 5, PS: 10, MAP: 8, PIP: 14    02-24 @ 07:01 - 02-25 @ 07:00  --------------------------------------------------------  IN: 1055 mL / OUT: 1700 mL / NET: -645 mL    02-25 @ 07:01 - 02-25 @ 13:40  --------------------------------------------------------  IN: 120 mL / OUT: 0 mL / NET: 120 mL      CAPILLARY BLOOD GLUCOSE      POCT Blood Glucose.: 250 mg/dL (25 Feb 2018 05:04)      PHYSICAL EXAM:  General: elderly woman in no acute distress  HEENT: NC/AT; PERRL, clear conjunctiva  Respiratory: intubated, no wheezes or ronchi  Cardiovascular: +S1/S2; RRR  Abdomen: soft, NT/ND  Extremities: warm to touch, no edema, peripheral pulses intact 2+  Skin: macular rash on arms, chest and leg, RIJ in place, peripheral lines in place with no evidence of infiltration  Neurological: awake and alert     LINES: [facility] MEDICATIONS:  Standing Meds:  aspirin  chewable 81 milliGRAM(s) Oral daily  dextrose 5%. 1000 milliLiter(s) IV Continuous <Continuous>  dextrose 5%. 1000 milliLiter(s) IV Continuous <Continuous>  dextrose 5%. 1000 milliLiter(s) IV Continuous <Continuous>  dextrose 50% Injectable 12.5 Gram(s) IV Push once  dextrose 50% Injectable 25 Gram(s) IV Push once  dextrose 50% Injectable 25 Gram(s) IV Push once  epoetin odalis Injectable 4000 Unit(s) IV Push <User Schedule>  heparin  Injectable 5000 Unit(s) SubCutaneous every 8 hours  insulin glargine Injectable (LANTUS) 10 Unit(s) SubCutaneous at bedtime  insulin lispro (HumaLOG) corrective regimen sliding scale   SubCutaneous every 6 hours  nafcillin  IVPB      nafcillin  IVPB 1 Gram(s) IV Intermittent every 4 hours  nystatin Powder 1 Application(s) Topical two times a day  pantoprazole  Injectable 40 milliGRAM(s) IV Push daily  predniSONE  Solution 20 milliGRAM(s) Oral daily  triamcinolone 0.1% Ointment 1 Application(s) Topical two times a day      PRN Meds:  chlorhexidine 0.12% Liquid 15 milliLiter(s) Swish and Spit every 4 hours PRN  dextrose Gel 1 Dose(s) Oral once PRN  glucagon  Injectable 1 milliGRAM(s) IntraMuscular once PRN  hydrocortisone 1% Ointment 1 Application(s) Topical every 8 hours PRN      LABS:                        7.2    7.6   )-----------( 219      ( 24 Feb 2018 23:24 )             21.8     Hgb Trend: 7.2<--, 7.4<--, 7.4<--, 8.4<--, 8.2<--  02-24    139  |  98  |  31<H>  ----------------------------<  206<H>  3.9   |  27  |  2.73<H>    Ca    8.4      24 Feb 2018 23:24  Phos  4.8     02-24  Mg     2.1     02-24    TPro  6.7  /  Alb  2.1<L>  /  TBili  0.8  /  DBili  x   /  AST  44<H>  /  ALT  63<H>  /  AlkPhos  323<H>  02-24    Creatinine Trend: 2.73<--, 3.91<--, 2.57<--, 3.71<--, 3.35<--, 3.08<--  PT/INR - ( 24 Feb 2018 23:24 )   PT: 11.3 sec;   INR: 1.04 ratio         PTT - ( 24 Feb 2018 23:24 )  PTT:38.0 sec      Venous Blood Gas:  02-24 @ 07:34  7.28/53/57/25/89  VBG Lactate: 0.8      MICROBIOLOGY:     RADIOLOGY:  [ ] Reviewed and interpreted by me    EKG:

## 2018-02-25 NOTE — PROGRESS NOTE ADULT - PROBLEM SELECTOR PLAN 1
likely from ATN in setting of cardiac arrest. Pt continues to be oligo-anuric, requiring dialysis. Needs tunneled HD catheter once RIJ removed. Last HD 2/24. Monitor off HD today. Monitor for any UO. Avoid nephrotoxins.

## 2018-02-26 LAB
APTT BLD: 29.6 SEC — SIGNIFICANT CHANGE UP (ref 27.5–37.4)
GLUCOSE BLDC GLUCOMTR-MCNC: 186 MG/DL — HIGH (ref 70–99)
GLUCOSE BLDC GLUCOMTR-MCNC: 261 MG/DL — HIGH (ref 70–99)
GLUCOSE BLDC GLUCOMTR-MCNC: 285 MG/DL — HIGH (ref 70–99)
HCT VFR BLD CALC: 23.5 % — LOW (ref 34.5–45)
HGB BLD-MCNC: 7.2 G/DL — LOW (ref 11.5–15.5)
INR BLD: 0.98 RATIO — SIGNIFICANT CHANGE UP (ref 0.88–1.16)
MCHC RBC-ENTMCNC: 29.8 PG — SIGNIFICANT CHANGE UP (ref 27–34)
MCHC RBC-ENTMCNC: 30.6 GM/DL — LOW (ref 32–36)
MCV RBC AUTO: 97.5 FL — SIGNIFICANT CHANGE UP (ref 80–100)
PLATELET # BLD AUTO: 291 K/UL — SIGNIFICANT CHANGE UP (ref 150–400)
PROTHROM AB SERPL-ACNC: 10.7 SEC — SIGNIFICANT CHANGE UP (ref 9.8–12.7)
RBC # BLD: 2.41 M/UL — LOW (ref 3.8–5.2)
RBC # FLD: 15.7 % — HIGH (ref 10.3–14.5)
WBC # BLD: 7.9 K/UL — SIGNIFICANT CHANGE UP (ref 3.8–10.5)
WBC # FLD AUTO: 7.9 K/UL — SIGNIFICANT CHANGE UP (ref 3.8–10.5)

## 2018-02-26 PROCEDURE — 93306 TTE W/DOPPLER COMPLETE: CPT | Mod: 26

## 2018-02-26 PROCEDURE — 99291 CRITICAL CARE FIRST HOUR: CPT | Mod: 25

## 2018-02-26 PROCEDURE — 99232 SBSQ HOSP IP/OBS MODERATE 35: CPT

## 2018-02-26 PROCEDURE — 99233 SBSQ HOSP IP/OBS HIGH 50: CPT | Mod: GC

## 2018-02-26 PROCEDURE — 76604 US EXAM CHEST: CPT | Mod: 26,59,GC

## 2018-02-26 PROCEDURE — 93308 TTE F-UP OR LMTD: CPT | Mod: 26,59,GC

## 2018-02-26 RX ORDER — HUMAN INSULIN 100 [IU]/ML
8 INJECTION, SUSPENSION SUBCUTANEOUS
Qty: 0 | Refills: 0 | Status: DISCONTINUED | OUTPATIENT
Start: 2018-02-26 | End: 2018-02-26

## 2018-02-26 RX ORDER — DIPHENHYDRAMINE HCL 50 MG
25 CAPSULE ORAL ONCE
Qty: 0 | Refills: 0 | Status: COMPLETED | OUTPATIENT
Start: 2018-02-26 | End: 2018-02-26

## 2018-02-26 RX ORDER — CEFAZOLIN SODIUM 1 G
1000 VIAL (EA) INJECTION EVERY 8 HOURS
Qty: 0 | Refills: 0 | Status: DISCONTINUED | OUTPATIENT
Start: 2018-02-26 | End: 2018-02-26

## 2018-02-26 RX ORDER — HUMAN INSULIN 100 [IU]/ML
10 INJECTION, SUSPENSION SUBCUTANEOUS
Qty: 0 | Refills: 0 | Status: DISCONTINUED | OUTPATIENT
Start: 2018-02-26 | End: 2018-03-02

## 2018-02-26 RX ORDER — DEXTROSE 50 % IN WATER 50 %
25 SYRINGE (ML) INTRAVENOUS ONCE
Qty: 0 | Refills: 0 | Status: DISCONTINUED | OUTPATIENT
Start: 2018-02-26 | End: 2018-03-30

## 2018-02-26 RX ORDER — DEXTROSE 50 % IN WATER 50 %
25 SYRINGE (ML) INTRAVENOUS ONCE
Qty: 0 | Refills: 0 | Status: DISCONTINUED | OUTPATIENT
Start: 2018-02-26 | End: 2018-03-16

## 2018-02-26 RX ORDER — CEFAZOLIN SODIUM 1 G
1000 VIAL (EA) INJECTION ONCE
Qty: 0 | Refills: 0 | Status: COMPLETED | OUTPATIENT
Start: 2018-02-26 | End: 2018-02-26

## 2018-02-26 RX ORDER — CEFAZOLIN SODIUM 1 G
VIAL (EA) INJECTION
Qty: 0 | Refills: 0 | Status: DISCONTINUED | OUTPATIENT
Start: 2018-02-26 | End: 2018-02-26

## 2018-02-26 RX ORDER — CEFAZOLIN SODIUM 1 G
2000 VIAL (EA) INJECTION
Qty: 0 | Refills: 0 | Status: DISCONTINUED | OUTPATIENT
Start: 2018-02-27 | End: 2018-03-04

## 2018-02-26 RX ORDER — GLUCAGON INJECTION, SOLUTION 0.5 MG/.1ML
1 INJECTION, SOLUTION SUBCUTANEOUS ONCE
Qty: 0 | Refills: 0 | Status: DISCONTINUED | OUTPATIENT
Start: 2018-02-26 | End: 2018-03-30

## 2018-02-26 RX ORDER — DEXTROSE 50 % IN WATER 50 %
12.5 SYRINGE (ML) INTRAVENOUS ONCE
Qty: 0 | Refills: 0 | Status: DISCONTINUED | OUTPATIENT
Start: 2018-02-26 | End: 2018-03-30

## 2018-02-26 RX ORDER — DEXTROSE 50 % IN WATER 50 %
1 SYRINGE (ML) INTRAVENOUS ONCE
Qty: 0 | Refills: 0 | Status: DISCONTINUED | OUTPATIENT
Start: 2018-02-26 | End: 2018-03-16

## 2018-02-26 RX ADMIN — NAFCILLIN 100 GRAM(S): 10 INJECTION, POWDER, FOR SOLUTION INTRAVENOUS at 09:14

## 2018-02-26 RX ADMIN — NYSTATIN CREAM 1 APPLICATION(S): 100000 CREAM TOPICAL at 05:06

## 2018-02-26 RX ADMIN — Medication 1 APPLICATION(S): at 05:06

## 2018-02-26 RX ADMIN — NAFCILLIN 100 GRAM(S): 10 INJECTION, POWDER, FOR SOLUTION INTRAVENOUS at 05:06

## 2018-02-26 RX ADMIN — Medication 6: at 05:07

## 2018-02-26 RX ADMIN — HUMAN INSULIN 8 UNIT(S): 100 INJECTION, SUSPENSION SUBCUTANEOUS at 17:04

## 2018-02-26 RX ADMIN — Medication 20 MILLIGRAM(S): at 05:07

## 2018-02-26 RX ADMIN — Medication 25 MILLIGRAM(S): at 09:29

## 2018-02-26 RX ADMIN — Medication 81 MILLIGRAM(S): at 12:02

## 2018-02-26 RX ADMIN — HEPARIN SODIUM 5000 UNIT(S): 5000 INJECTION INTRAVENOUS; SUBCUTANEOUS at 21:30

## 2018-02-26 RX ADMIN — Medication 1 APPLICATION(S): at 09:00

## 2018-02-26 RX ADMIN — PANTOPRAZOLE SODIUM 40 MILLIGRAM(S): 20 TABLET, DELAYED RELEASE ORAL at 12:01

## 2018-02-26 RX ADMIN — HEPARIN SODIUM 5000 UNIT(S): 5000 INJECTION INTRAVENOUS; SUBCUTANEOUS at 05:06

## 2018-02-26 RX ADMIN — NAFCILLIN 100 GRAM(S): 10 INJECTION, POWDER, FOR SOLUTION INTRAVENOUS at 01:55

## 2018-02-26 RX ADMIN — Medication 2: at 17:05

## 2018-02-26 RX ADMIN — Medication 100 MILLIGRAM(S): at 12:47

## 2018-02-26 RX ADMIN — NYSTATIN CREAM 1 APPLICATION(S): 100000 CREAM TOPICAL at 17:08

## 2018-02-26 RX ADMIN — HEPARIN SODIUM 5000 UNIT(S): 5000 INJECTION INTRAVENOUS; SUBCUTANEOUS at 14:27

## 2018-02-26 RX ADMIN — Medication 6: at 12:05

## 2018-02-26 RX ADMIN — Medication 1 APPLICATION(S): at 17:09

## 2018-02-26 RX ADMIN — CHLORHEXIDINE GLUCONATE 15 MILLILITER(S): 213 SOLUTION TOPICAL at 12:01

## 2018-02-26 NOTE — PROGRESS NOTE ADULT - ATTENDING COMMENTS
Remains intubated, oliguric  1.  ARF--HD dependent.  HD tomorrow.  No absolute indications today with last rx 2/24  2.  Respiratory failure, acute--aspiration.  No volume overload, no aggressive UF at this time  3.  Anemia--transfusion, DAVID  4.  HyperPO4--binder

## 2018-02-26 NOTE — PROGRESS NOTE ADULT - PROBLEM SELECTOR PLAN 1
-Check bs q6  -Increase NPH from 8 to 10 units sq at 12pm, 6pm, and 12am as TF are held from 6am-12pm.  -mod scale q6  -will adjust as diet is changed/advanced  Patria Weiner MD  [phone]

## 2018-02-26 NOTE — PROGRESS NOTE ADULT - SUBJECTIVE AND OBJECTIVE BOX
[facility] DIVISION OF KIDNEY DISEASES AND HYPERTENSION -- FOLLOW UP NOTE  --------------------------------------------------------------------------------  Chief Complaint:    24 hour events/subjective:        PAST HISTORY  --------------------------------------------------------------------------------  No significant changes to PMH, PSH, FHx, SHx, unless otherwise noted    ALLERGIES & MEDICATIONS  --------------------------------------------------------------------------------  Allergies    doxycycline (Rash)  isoniazid (Rash)  NIFEdipine (Urticaria; Hives)  vitamin E (Short breath; Urticaria; Hives)    Intolerances      Standing Inpatient Medications  aspirin  chewable 81 milliGRAM(s) Oral daily  dextrose 5%. 1000 milliLiter(s) IV Continuous <Continuous>  dextrose 5%. 1000 milliLiter(s) IV Continuous <Continuous>  dextrose 5%. 1000 milliLiter(s) IV Continuous <Continuous>  diphenhydrAMINE   Injectable 25 milliGRAM(s) IV Push once  epoetin odalis Injectable 4000 Unit(s) IV Push <User Schedule>  heparin  Injectable 5000 Unit(s) SubCutaneous every 8 hours  insulin glargine Injectable (LANTUS) 15 Unit(s) SubCutaneous at bedtime  insulin lispro (HumaLOG) corrective regimen sliding scale   SubCutaneous every 6 hours  nafcillin  IVPB      nafcillin  IVPB 1 Gram(s) IV Intermittent every 4 hours  nystatin Powder 1 Application(s) Topical two times a day  pantoprazole  Injectable 40 milliGRAM(s) IV Push daily  predniSONE  Solution 20 milliGRAM(s) Oral daily  triamcinolone 0.1% Ointment 1 Application(s) Topical two times a day    PRN Inpatient Medications  chlorhexidine 0.12% Liquid 15 milliLiter(s) Swish and Spit every 4 hours PRN  hydrocortisone 1% Ointment 1 Application(s) Topical every 8 hours PRN      REVIEW OF SYSTEMS  --------------------------------------------------------------------------------  Gen: No weight changes, fatigue, fevers/chills, weakness  Skin: No rashes  Head/Eyes/Ears/Mouth: No headache; Normal hearing; Normal vision w/o blurriness; No sinus pain/discomfort, sore throat  Respiratory: No dyspnea, cough, wheezing, hemoptysis  CV: No chest pain, PND, orthopnea  GI: No abdominal pain, diarrhea, constipation, nausea, vomiting, melena, hematochezia  : No increased frequency, dysuria, hematuria, nocturia  MSK: No joint pain/swelling; no back pain; no edema  Neuro: No dizziness/lightheadedness, weakness, seizures, numbness, tingling  Heme: No easy bruising or bleeding  Endo: No heat/cold intolerance  Psych: No significant nervousness, anxiety, stress, depression    All other systems were reviewed and are negative, except as noted.    VITALS/PHYSICAL EXAM  --------------------------------------------------------------------------------  T(C): 37.1 (02-26-18 @ 08:00), Max: 37.8 (02-25-18 @ 22:00)  HR: 85 (02-26-18 @ 09:00) (69 - 93)  BP: 115/49 (02-26-18 @ 09:00) (107/49 - 156/71)  RR: 17 (02-26-18 @ 09:00) (16 - 84)  SpO2: 100% (02-26-18 @ 09:00) (92% - 100%)  Wt(kg): --        02-25-18 @ 07:01  -  02-26-18 @ 07:00  --------------------------------------------------------  IN: 1070 mL / OUT: 0 mL / NET: 1070 mL    02-26-18 @ 07:01  -  02-26-18 @ 09:21  --------------------------------------------------------  IN: 0 mL / OUT: 0 mL / NET: 0 mL      Physical Exam:  	Gen: NAD, well-appearing  	HEENT: PERRL, supple neck, clear oropharynx  	Pulm: CTA B/L  	CV: RRR, S1S2; no rub  	Back: No spinal or CVA tenderness; no sacral edema  	Abd: +BS, soft, nontender/nondistended  	: No suprapubic tenderness  	UE: Warm, FROM, no clubbing, intact strength; no edema; no asterixis  	LE: Warm, FROM, no clubbing, intact strength; no edema  	Neuro: No focal deficits, intact gait  	Psych: Normal affect and mood  	Skin: Warm, without rashes  	Vascular access:    LABS/STUDIES  --------------------------------------------------------------------------------              7.1    5.6   >-----------<  237      [02-25-18 @ 22:49]              21.9     135  |  96  |  49  ----------------------------<  283      [02-25-18 @ 22:49]  3.9   |  24  |  4.07        Ca     8.6     [02-25-18 @ 22:49]      Mg     2.4     [02-25-18 @ 22:49]      Phos  5.6     [02-25-18 @ 22:49]    TPro  6.3  /  Alb  2.0  /  TBili  0.5  /  DBili  x   /  AST  35  /  ALT  54  /  AlkPhos  342  [02-25-18 @ 22:49]    PT/INR: PT 10.7 , INR 0.99       [02-25-18 @ 22:49]  PTT: 27.2       [02-25-18 @ 22:49]      Creatinine Trend:  SCr 4.07 [02-25 @ 22:49]  SCr 2.73 [02-24 @ 23:24]  SCr 3.91 [02-24 @ 00:00]  SCr 2.57 [02-22 @ 23:44]  SCr 3.71 [02-22 @ 03:37]    Urinalysis - [01-30-18 @ 12:52]      Color Yellow / Appearance SL Turbid / SG 1.025 / pH 6.0      Gluc 50 / Ketone Negative  / Bili Negative / Urobili Negative       Blood Trace / Protein 150 / Leuk Est Negative / Nitrite Negative      RBC 3-5 / WBC  / Hyaline  / Gran  / Sq Epi  / Non Sq Epi OCC / Bacteria       HbA1c 8.6      [01-31-18 @ 07:15]  Lipid: chol 183, , HDL 10,       [02-07-18 @ 13:34]    HBsAg Nonreact      [02-01-18 @ 22:49]  HCV 0.16, Nonreact      [02-01-18 @ 22:49] [facility] DIVISION OF KIDNEY DISEASES AND HYPERTENSION -- FOLLOW UP NOTE  --------------------------------------------------------------------------------  Chief Complaint: RUSS    24 hour events/subjective:  Pt with pruritic rash (generalized)  receiving Benadryl and topical steroids.  Drowsy this AM from Benadryl        PAST HISTORY  --------------------------------------------------------------------------------  No significant changes to PMH, PSH, FHx, SHx, unless otherwise noted    ALLERGIES & MEDICATIONS  --------------------------------------------------------------------------------  Allergies    doxycycline (Rash)  isoniazid (Rash)  NIFEdipine (Urticaria; Hives)  vitamin E (Short breath; Urticaria; Hives)    Intolerances      Standing Inpatient Medications  aspirin  chewable 81 milliGRAM(s) Oral daily  dextrose 5%. 1000 milliLiter(s) IV Continuous <Continuous>  dextrose 5%. 1000 milliLiter(s) IV Continuous <Continuous>  dextrose 5%. 1000 milliLiter(s) IV Continuous <Continuous>  diphenhydrAMINE   Injectable 25 milliGRAM(s) IV Push once  epoetin odalis Injectable 4000 Unit(s) IV Push <User Schedule>  heparin  Injectable 5000 Unit(s) SubCutaneous every 8 hours  insulin glargine Injectable (LANTUS) 15 Unit(s) SubCutaneous at bedtime  insulin lispro (HumaLOG) corrective regimen sliding scale   SubCutaneous every 6 hours  nafcillin  IVPB      nafcillin  IVPB 1 Gram(s) IV Intermittent every 4 hours  nystatin Powder 1 Application(s) Topical two times a day  pantoprazole  Injectable 40 milliGRAM(s) IV Push daily  predniSONE  Solution 20 milliGRAM(s) Oral daily  triamcinolone 0.1% Ointment 1 Application(s) Topical two times a day    PRN Inpatient Medications  chlorhexidine 0.12% Liquid 15 milliLiter(s) Swish and Spit every 4 hours PRN  hydrocortisone 1% Ointment 1 Application(s) Topical every 8 hours PRN      REVIEW OF SYSTEMS  --------------------------------------------------------------------------------  unable to obtain    VITALS/PHYSICAL EXAM  --------------------------------------------------------------------------------  T(C): 37.1 (02-26-18 @ 08:00), Max: 37.8 (02-25-18 @ 22:00)  HR: 85 (02-26-18 @ 09:00) (69 - 93)  BP: 115/49 (02-26-18 @ 09:00) (107/49 - 156/71)  RR: 17 (02-26-18 @ 09:00) (16 - 84)  SpO2: 100% (02-26-18 @ 09:00) (92% - 100%)  Wt(kg): --        02-25-18 @ 07:01  -  02-26-18 @ 07:00  --------------------------------------------------------  IN: 1070 mL / OUT: 0 mL / NET: 1070 mL    02-26-18 @ 07:01  -  02-26-18 @ 09:21  --------------------------------------------------------  IN: 0 mL / OUT: 0 mL / NET: 0 mL      Physical Exam:               Gen: intubated, drowsy  	Pulm: CTA B/L  	CV: RRR, S1S2; no rub  	Abd: +BS, soft, nontender/nondistended  	: No suprapubic tenderness  	UE: Warm, no edema  	LE: Warm, no edema  	Skin: maculopapular eruption on arms /legs  	Vascular access:  RIJ non-tunneled HD catheter - site c/d/i      LABS/STUDIES  --------------------------------------------------------------------------------              7.1    5.6   >-----------<  237      [02-25-18 @ 22:49]              21.9     135  |  96  |  49  ----------------------------<  283      [02-25-18 @ 22:49]  3.9   |  24  |  4.07        Ca     8.6     [02-25-18 @ 22:49]      Mg     2.4     [02-25-18 @ 22:49]      Phos  5.6     [02-25-18 @ 22:49]    TPro  6.3  /  Alb  2.0  /  TBili  0.5  /  DBili  x   /  AST  35  /  ALT  54  /  AlkPhos  342  [02-25-18 @ 22:49]    PT/INR: PT 10.7 , INR 0.99       [02-25-18 @ 22:49]  PTT: 27.2       [02-25-18 @ 22:49]      Creatinine Trend:  SCr 4.07 [02-25 @ 22:49]  SCr 2.73 [02-24 @ 23:24]  SCr 3.91 [02-24 @ 00:00]  SCr 2.57 [02-22 @ 23:44]  SCr 3.71 [02-22 @ 03:37]    Urinalysis - [01-30-18 @ 12:52]      Color Yellow / Appearance SL Turbid / SG 1.025 / pH 6.0      Gluc 50 / Ketone Negative  / Bili Negative / Urobili Negative       Blood Trace / Protein 150 / Leuk Est Negative / Nitrite Negative      RBC 3-5 / WBC  / Hyaline  / Gran  / Sq Epi  / Non Sq Epi OCC / Bacteria       HbA1c 8.6      [01-31-18 @ 07:15]  Lipid: chol 183, , HDL 10,       [02-07-18 @ 13:34]    HBsAg Nonreact      [02-01-18 @ 22:49]  HCV 0.16, Nonreact      [02-01-18 @ 22:49]

## 2018-02-26 NOTE — PROGRESS NOTE ADULT - SUBJECTIVE AND OBJECTIVE BOX
Follow-up Pulm Progress Note  Caleb Mon MD  [phone]    Events noted  More awake and hemodynamically stable off vasopressors  S Aureus in blood culture  Intubated on CMV FIO2 40%  Afebrile on Cefazolin, s/[ nafcilling  CXR with partial clearing or R consolidation  Tolerating CPAP/PS    Vital Signs Last 24 Hrs  T(C): 37.1 (26 Feb 2018 08:00), Max: 37.8 (25 Feb 2018 22:00)  T(F): 98.8 (26 Feb 2018 08:00), Max: 100.1 (25 Feb 2018 22:00)  HR: 87 (26 Feb 2018 10:00) (69 - 90)  BP: 130/58 (26 Feb 2018 10:00) (107/49 - 156/71)  BP(mean): 83 (26 Feb 2018 10:00) (69 - 102)  RR: 23 (26 Feb 2018 10:00) (16 - 84)  SpO2: 100% (26 Feb 2018 10:00) (100% - 100%)                       7.1    5.6   )-----------( 237      ( 25 Feb 2018 22:49 )             21.9       02-25    135  |  96  |  49<H>  ----------------------------<  283<H>  3.9   |  24  |  4.07<H>    Ca    8.6      25 Feb 2018 22:49  Phos  5.6     02-25  Mg     2.4     02-25    TPro  6.3  /  Alb  2.0<L>  /  TBili  0.5  /  DBili  x   /  AST  35  /  ALT  54<H>  /  AlkPhos  342<H>  02-25    CULTURES:    Physical Examination:  PULM: scattered rhonchi  CVS: Regular rate and rhythm, no murmurs, rubs, or gallops  ABD: Soft, non-tender  EXT:  No clubbing, cyanosis, or edema    RADIOLOGY REVIEWED  CXR: Extensive R sided opacities c/w pneumonia    CT chest:    TTE:

## 2018-02-26 NOTE — PROGRESS NOTE ADULT - SUBJECTIVE AND OBJECTIVE BOX
CHIEF COMPLAINT:Patient is a 68y old  Female who presents with a chief complaint of Fever, total body weakness (02 Feb 2018 13:44)        Interval Events:    REVIEW OF SYSTEMS:  Constitutional: [ ] negative [ ] fevers [ ] chills [ ] weight loss [ ] weight gain  HEENT: [ ] negative [ ] dry eyes [ ] eye irritation [ ] postnasal drip [ ] nasal congestion  CV: [ ] negative  [ ] chest pain [ ] orthopnea [ ] palpitations [ ] murmur  Resp: [ ] negative [ ] cough [ ] shortness of breath [ ] dyspnea [ ] wheezing [ ] sputum [ ] hemoptysis  GI: [ ] negative [ ] nausea [ ] vomiting [ ] diarrhea [ ] constipation [ ] abd pain [ ] dysphagia   : [ ] negative [ ] dysuria [ ] nocturia [ ] hematuria [ ] increased urinary frequency  Musculoskeletal: [ ] negative [ ] back pain [ ] myalgias [ ] arthralgias [ ] fracture  Skin: [ ] negative [ ] rash [ ] itch  Neurological: [ ] negative [ ] headache [ ] dizziness [ ] syncope [ ] weakness [ ] numbness  Psychiatric: [ ] negative [ ] anxiety [ ] depression  Endocrine: [ ] negative [ ] diabetes [ ] thyroid problem  Hematologic/Lymphatic: [ ] negative [ ] anemia [ ] bleeding problem  Allergic/Immunologic: [ ] negative [ ] itchy eyes [ ] nasal discharge [ ] hives [ ] angioedema  [ ] All other systems negative  [ ] Unable to assess ROS because ________    OBJECTIVE:  ICU Vital Signs Last 24 Hrs  T(C): 36.9 (26 Feb 2018 04:00), Max: 37.8 (25 Feb 2018 22:00)  T(F): 98.5 (26 Feb 2018 04:00), Max: 100.1 (25 Feb 2018 22:00)  HR: 84 (26 Feb 2018 06:00) (69 - 93)  BP: 133/61 (26 Feb 2018 06:00) (107/49 - 156/71)  BP(mean): 88 (26 Feb 2018 06:00) (69 - 102)  ABP: --  ABP(mean): --  RR: 84 (26 Feb 2018 06:00) (16 - 84)  SpO2: 100% (26 Feb 2018 06:00) (92% - 100%)    Mode: CPAP with PS, FiO2: 40, PEEP: 5, PS: 10, MAP: 9, PIP: 15    02-24 @ 07:01 - 02-25 @ 07:00  --------------------------------------------------------  IN: 1055 mL / OUT: 1700 mL / NET: -645 mL    02-25 @ 07:01 - 02-26 @ 06:59  --------------------------------------------------------  IN: 1070 mL / OUT: 0 mL / NET: 1070 mL      CAPILLARY BLOOD GLUCOSE      POCT Blood Glucose.: 285 mg/dL (26 Feb 2018 04:57)      PHYSICAL EXAM:  General:   HEENT:   Lymph Nodes:  Neck:   Respiratory:   Cardiovascular:   Abdomen:   Extremities:   Skin:   Neurological:  Psychiatry:    LINES:    HOSPITAL MEDICATIONS:  Standing Meds:  aspirin  chewable 81 milliGRAM(s) Oral daily  dextrose 5%. 1000 milliLiter(s) IV Continuous <Continuous>  dextrose 5%. 1000 milliLiter(s) IV Continuous <Continuous>  dextrose 5%. 1000 milliLiter(s) IV Continuous <Continuous>  epoetin odalis Injectable 4000 Unit(s) IV Push <User Schedule>  heparin  Injectable 5000 Unit(s) SubCutaneous every 8 hours  insulin glargine Injectable (LANTUS) 15 Unit(s) SubCutaneous at bedtime  insulin lispro (HumaLOG) corrective regimen sliding scale   SubCutaneous every 6 hours  nafcillin  IVPB      nafcillin  IVPB 1 Gram(s) IV Intermittent every 4 hours  nystatin Powder 1 Application(s) Topical two times a day  pantoprazole  Injectable 40 milliGRAM(s) IV Push daily  predniSONE  Solution 20 milliGRAM(s) Oral daily  triamcinolone 0.1% Ointment 1 Application(s) Topical two times a day      PRN Meds:  chlorhexidine 0.12% Liquid 15 milliLiter(s) Swish and Spit every 4 hours PRN  hydrocortisone 1% Ointment 1 Application(s) Topical every 8 hours PRN      LABS:                        7.1    5.6   )-----------( 237      ( 25 Feb 2018 22:49 )             21.9     Hgb Trend: 7.1<--, 7.2<--, 7.4<--, 7.4<--, 8.4<--  02-25    135  |  96  |  49<H>  ----------------------------<  283<H>  3.9   |  24  |  4.07<H>    Ca    8.6      25 Feb 2018 22:49  Phos  5.6     02-25  Mg     2.4     02-25    TPro  6.3  /  Alb  2.0<L>  /  TBili  0.5  /  DBili  x   /  AST  35  /  ALT  54<H>  /  AlkPhos  342<H>  02-25    Creatinine Trend: 4.07<--, 2.73<--, 3.91<--, 2.57<--, 3.71<--, 3.35<--  PT/INR - ( 25 Feb 2018 22:49 )   PT: 10.7 sec;   INR: 0.99 ratio         PTT - ( 25 Feb 2018 22:49 )  PTT:27.2 sec      Venous Blood Gas:  02-24 @ 07:34  7.28/53/57/25/89  VBG Lactate: 0.8      MICROBIOLOGY:     Culture - Blood (collected 24 Feb 2018 15:30)  Source: .Blood Blood-Venous  Preliminary Report (25 Feb 2018 16:00):    No growth to date.    Culture - Blood (collected 24 Feb 2018 15:30)  Source: .Blood Blood-Peripheral  Preliminary Report (25 Feb 2018 16:00):    No growth to date.      RADIOLOGY:  [ ] Reviewed and interpreted by me    EKG: CHIEF COMPLAINT:Patient is a 68y old  Female who presents with a chief complaint of Fever, total body weakness (02 Feb 2018 13:44)  67F PMH HTN, DMT2, Breast Cancer (diagnosed in Feb 2017) currently on trastuzumab (last dose Jan 20th), originally presented 1/30 with fever found to have influenza subsequently went into PEA arrest x 2 with ROSC, complicated by bilateral pneumothoraces with pneumomediastinum s/p 3 chest tubes, and also new ESRD requiring almost daily HD, readmitted to the MICU for hypoxic respiratory failure likely 2/2 aspiration pneumonia.       Interval Events:  No acute events overnight. Per daughter, patient was alert and interactive yesterday, celebrating her birthday with her family. This morning, patient drowsy and not very responsive likely secondary to benadryl administration.     REVIEW OF SYSTEMS:  Constitutional: [ ] negative [ ] fevers [ ] chills [ ] weight loss [ ] weight gain  HEENT: [ ] negative [ ] dry eyes [ ] eye irritation [ ] postnasal drip [ ] nasal congestion  CV: [ ] negative  [ ] chest pain [ ] orthopnea [ ] palpitations [ ] murmur  Resp: [ ] negative [ ] cough [ ] shortness of breath [ ] dyspnea [ ] wheezing [ ] sputum [ ] hemoptysis  GI: [ ] negative [ ] nausea [ ] vomiting [ ] diarrhea [ ] constipation [ ] abd pain [ ] dysphagia   : [ ] negative [ ] dysuria [ ] nocturia [ ] hematuria [ ] increased urinary frequency  Musculoskeletal: [ ] negative [ ] back pain [ ] myalgias [ ] arthralgias [ ] fracture  Skin: [ ] negative [ ] rash [ ] itch  Neurological: [ ] negative [ ] headache [ ] dizziness [ ] syncope [ ] weakness [ ] numbness  Psychiatric: [ ] negative [ ] anxiety [ ] depression  Endocrine: [ ] negative [ ] diabetes [ ] thyroid problem  Hematologic/Lymphatic: [ ] negative [ ] anemia [ ] bleeding problem  Allergic/Immunologic: [ ] negative [ ] itchy eyes [ ] nasal discharge [ ] hives [ ] angioedema  [ ] All other systems negative  [ ] Unable to assess ROS because ________    OBJECTIVE:  ICU Vital Signs Last 24 Hrs  T(C): 36.9 (26 Feb 2018 04:00), Max: 37.8 (25 Feb 2018 22:00)  T(F): 98.5 (26 Feb 2018 04:00), Max: 100.1 (25 Feb 2018 22:00)  HR: 84 (26 Feb 2018 06:00) (69 - 93)  BP: 133/61 (26 Feb 2018 06:00) (107/49 - 156/71)  BP(mean): 88 (26 Feb 2018 06:00) (69 - 102)  ABP: --  ABP(mean): --  RR: 84 (26 Feb 2018 06:00) (16 - 84)  SpO2: 100% (26 Feb 2018 06:00) (92% - 100%)    Mode: CPAP with PS, FiO2: 40, PEEP: 5, PS: 10, MAP: 9, PIP: 15    02-24 @ 07:01 - 02-25 @ 07:00  --------------------------------------------------------  IN: 1055 mL / OUT: 1700 mL / NET: -645 mL    02-25 @ 07:01  - 02-26 @ 06:59  --------------------------------------------------------  IN: 1070 mL / OUT: 0 mL / NET: 1070 mL      CAPILLARY BLOOD GLUCOSE      POCT Blood Glucose.: 285 mg/dL (26 Feb 2018 04:57)      PHYSICAL EXAM:  General:   HEENT:   Lymph Nodes:  Neck:   Respiratory:   Cardiovascular:   Abdomen:   Extremities:   Skin:   Neurological:  Psychiatry:    LINES:    HOSPITAL MEDICATIONS:  Standing Meds:  aspirin  chewable 81 milliGRAM(s) Oral daily  dextrose 5%. 1000 milliLiter(s) IV Continuous <Continuous>  dextrose 5%. 1000 milliLiter(s) IV Continuous <Continuous>  dextrose 5%. 1000 milliLiter(s) IV Continuous <Continuous>  epoetin odalis Injectable 4000 Unit(s) IV Push <User Schedule>  heparin  Injectable 5000 Unit(s) SubCutaneous every 8 hours  insulin glargine Injectable (LANTUS) 15 Unit(s) SubCutaneous at bedtime  insulin lispro (HumaLOG) corrective regimen sliding scale   SubCutaneous every 6 hours  nafcillin  IVPB      nafcillin  IVPB 1 Gram(s) IV Intermittent every 4 hours  nystatin Powder 1 Application(s) Topical two times a day  pantoprazole  Injectable 40 milliGRAM(s) IV Push daily  predniSONE  Solution 20 milliGRAM(s) Oral daily  triamcinolone 0.1% Ointment 1 Application(s) Topical two times a day      PRN Meds:  chlorhexidine 0.12% Liquid 15 milliLiter(s) Swish and Spit every 4 hours PRN  hydrocortisone 1% Ointment 1 Application(s) Topical every 8 hours PRN      LABS:                        7.1    5.6   )-----------( 237      ( 25 Feb 2018 22:49 )             21.9     Hgb Trend: 7.1<--, 7.2<--, 7.4<--, 7.4<--, 8.4<--  02-25    135  |  96  |  49<H>  ----------------------------<  283<H>  3.9   |  24  |  4.07<H>    Ca    8.6      25 Feb 2018 22:49  Phos  5.6     02-25  Mg     2.4     02-25    TPro  6.3  /  Alb  2.0<L>  /  TBili  0.5  /  DBili  x   /  AST  35  /  ALT  54<H>  /  AlkPhos  342<H>  02-25    Creatinine Trend: 4.07<--, 2.73<--, 3.91<--, 2.57<--, 3.71<--, 3.35<--  PT/INR - ( 25 Feb 2018 22:49 )   PT: 10.7 sec;   INR: 0.99 ratio         PTT - ( 25 Feb 2018 22:49 )  PTT:27.2 sec      Venous Blood Gas:  02-24 @ 07:34  7.28/53/57/25/89  VBG Lactate: 0.8      MICROBIOLOGY:     Culture - Blood (collected 24 Feb 2018 15:30)  Source: .Blood Blood-Venous  Preliminary Report (25 Feb 2018 16:00):    No growth to date.    Culture - Blood (collected 24 Feb 2018 15:30)  Source: .Blood Blood-Peripheral  Preliminary Report (25 Feb 2018 16:00):    No growth to date.      RADIOLOGY:  [ ] Reviewed and interpreted by me    EKG: CHIEF COMPLAINT:Patient is a 68y old  Female who presents with a chief complaint of Fever, total body weakness (02 Feb 2018 13:44)  67F PMH HTN, DMT2, Breast Cancer (diagnosed in Feb 2017) currently on trastuzumab (last dose Jan 20th), originally presented 1/30 with fever found to have influenza subsequently went into PEA arrest x 2 with ROSC, complicated by bilateral pneumothoraces with pneumomediastinum s/p 3 chest tubes, and also new ESRD requiring almost daily HD, readmitted to the MICU for hypoxic respiratory failure likely 2/2 aspiration pneumonia.       Interval Events:  No acute events overnight. Per daughter, patient was alert and interactive yesterday, celebrating her birthday with her family. This morning, patient drowsy and not very responsive likely secondary to benadryl administration.     REVIEW OF SYSTEMS:  Constitutional: [ ] negative [ ] fevers [ ] chills [ ] weight loss [ ] weight gain  HEENT: [ ] negative [ ] dry eyes [ ] eye irritation [ ] postnasal drip [ ] nasal congestion  CV: [ ] negative  [ ] chest pain [ ] orthopnea [ ] palpitations [ ] murmur  Resp: [ ] negative [ ] cough [ ] shortness of breath [ ] dyspnea [ ] wheezing [ ] sputum [ ] hemoptysis  GI: [ ] negative [ ] nausea [ ] vomiting [ ] diarrhea [ ] constipation [ ] abd pain [ ] dysphagia   : [ ] negative [ ] dysuria [ ] nocturia [ ] hematuria [ ] increased urinary frequency  Musculoskeletal: [ ] negative [ ] back pain [ ] myalgias [ ] arthralgias [ ] fracture  Skin: [ ] negative [ ] rash [ ] itch  Neurological: [ ] negative [ ] headache [ ] dizziness [ ] syncope [ ] weakness [ ] numbness  Psychiatric: [ ] negative [ ] anxiety [ ] depression  Endocrine: [ ] negative [ ] diabetes [ ] thyroid problem  Hematologic/Lymphatic: [ ] negative [ ] anemia [ ] bleeding problem  Allergic/Immunologic: [ ] negative [ ] itchy eyes [ ] nasal discharge [ ] hives [ ] angioedema  [ ] All other systems negative  [ X] Unable to assess ROS because patient sedated    OBJECTIVE:  ICU Vital Signs Last 24 Hrs  T(C): 36.9 (26 Feb 2018 04:00), Max: 37.8 (25 Feb 2018 22:00)  T(F): 98.5 (26 Feb 2018 04:00), Max: 100.1 (25 Feb 2018 22:00)  HR: 84 (26 Feb 2018 06:00) (69 - 93)  BP: 133/61 (26 Feb 2018 06:00) (107/49 - 156/71)  BP(mean): 88 (26 Feb 2018 06:00) (69 - 102)  ABP: --  ABP(mean): --  RR: 84 (26 Feb 2018 06:00) (16 - 84)  SpO2: 100% (26 Feb 2018 06:00) (92% - 100%)    Mode: CPAP with PS, FiO2: 40, PEEP: 5, PS: 10, MAP: 9, PIP: 15    02-24 @ 07:01  -  02-25 @ 07:00  --------------------------------------------------------  IN: 1055 mL / OUT: 1700 mL / NET: -645 mL    02-25 @ 07:01  -  02-26 @ 06:59  --------------------------------------------------------  IN: 1070 mL / OUT: 0 mL / NET: 1070 mL      CAPILLARY BLOOD GLUCOSE      POCT Blood Glucose.: 285 mg/dL (26 Feb 2018 04:57)      General: WN/WD NAD  Neurology: sedated, awakens to stimuli  Head:  Normocephalic, atraumatic  ENT:  Mucosa moist, no ulcerations  Neck:  Supple, no sinuses or palpable masses  Respiratory: CTA B/L  CV: RRR, S1S2, no murmur  Abdominal: Soft, NT, ND no palpable mass  MSK: No edema, + peripheral pulse  Skin: erythematous rash on b/l upper and lower extremities- upper>lower, pruritic      LINES: right IJ    HOSPITAL MEDICATIONS:  Standing Meds:  aspirin  chewable 81 milliGRAM(s) Oral daily  dextrose 5%. 1000 milliLiter(s) IV Continuous <Continuous>  dextrose 5%. 1000 milliLiter(s) IV Continuous <Continuous>  dextrose 5%. 1000 milliLiter(s) IV Continuous <Continuous>  epoetin odalis Injectable 4000 Unit(s) IV Push <User Schedule>  heparin  Injectable 5000 Unit(s) SubCutaneous every 8 hours  insulin glargine Injectable (LANTUS) 15 Unit(s) SubCutaneous at bedtime  insulin lispro (HumaLOG) corrective regimen sliding scale   SubCutaneous every 6 hours  nafcillin  IVPB      nafcillin  IVPB 1 Gram(s) IV Intermittent every 4 hours  nystatin Powder 1 Application(s) Topical two times a day  pantoprazole  Injectable 40 milliGRAM(s) IV Push daily  predniSONE  Solution 20 milliGRAM(s) Oral daily  triamcinolone 0.1% Ointment 1 Application(s) Topical two times a day      PRN Meds:  chlorhexidine 0.12% Liquid 15 milliLiter(s) Swish and Spit every 4 hours PRN  hydrocortisone 1% Ointment 1 Application(s) Topical every 8 hours PRN      LABS:                        7.1    5.6   )-----------( 237      ( 25 Feb 2018 22:49 )             21.9     Hgb Trend: 7.1<--, 7.2<--, 7.4<--, 7.4<--, 8.4<--  02-25    135  |  96  |  49<H>  ----------------------------<  283<H>  3.9   |  24  |  4.07<H>    Ca    8.6      25 Feb 2018 22:49  Phos  5.6     02-25  Mg     2.4     02-25    TPro  6.3  /  Alb  2.0<L>  /  TBili  0.5  /  DBili  x   /  AST  35  /  ALT  54<H>  /  AlkPhos  342<H>  02-25    Creatinine Trend: 4.07<--, 2.73<--, 3.91<--, 2.57<--, 3.71<--, 3.35<--  PT/INR - ( 25 Feb 2018 22:49 )   PT: 10.7 sec;   INR: 0.99 ratio         PTT - ( 25 Feb 2018 22:49 )  PTT:27.2 sec      Venous Blood Gas:  02-24 @ 07:34  7.28/53/57/25/89  VBG Lactate: 0.8      MICROBIOLOGY:     Culture - Blood (collected 24 Feb 2018 15:30)  Source: .Blood Blood-Venous  Preliminary Report (25 Feb 2018 16:00):    No growth to date.    Culture - Blood (collected 24 Feb 2018 15:30)  Source: .Blood Blood-Peripheral  Preliminary Report (25 Feb 2018 16:00):    No growth to date.      RADIOLOGY:  [ ] Reviewed and interpreted by me    EKG:

## 2018-02-26 NOTE — PROGRESS NOTE ADULT - ASSESSMENT
67F PMH HTN, DMT2, Breast Cancer (diagnosed in Feb 2017) currently on trastuzumab (last dose Jan 20th), originally presented 1/30 with fever found to have influenza subsequently went into PEA arrest x 2 with ROSC, complicated by bilateral pneumothoraces with pneumomediastinum s/p 3 chest tubes, and also new ESRD requiring almost daily HD, readmitted to the MICU for hypoxic respiratory failure likely 2/2 aspiration pneumonia.     # Neuro:  - awake and alert, off sedation    - Known watershed ischemia of the occiput from low flow state during admission 2/2 flu  - Low suspicion for repeat CVA event, no need for CT or MR of the head as of now    # CV:  - currently off pressors  - MAPs > 65  - holding antihypertensives in setting of septic shock    # Pulm:  - currently intubated for hypoxemic resp fail 2/2 to asp pna  - patient a candidate for extubation, however, concern for continued aspiration  - f/u discussion with family regarding tracheostomy   - Titrate O2 down as tolerated to prevent hyperoxia  - Patient currently on CPAP trial.  - c/w treatment of staph aureus (pan sensitive) PNA with nafcillin (day 3); s/p 5 days of vanc/ zosyng    # Renal:  - plan for dialysis today   - Patient pending permacath placement by IR once afebrile   - Appreciate renal recommendations    # ID: concern for aspiration PNA with blood culture and sputum positive for staph aureus    - afebrile  - repeat blood cultures NGTD (from 2/24)  - patient has staph aureus pneumonia; pan sensitive, completed 5 days of vanc/zosyn; nafcililn day 2     # GI:  - continue feeds via NG, Nepro @ 40cc/hr x 24 hrs to provide 960cc fluids recommended by nutrition  - GI ppx with protonix 40mg daily    # Endocrine:  - Continue tube feeds  - Continue with 10U Lantus and FS q6h with low SSI.    # Heme:  - Will continue to trend H/H  - Currently holding Herceptin    # Skin:  - No sacral decubiti or signs of infection  - macular rash on arms, chest and legs  - started steroid course  - will consult derm if no improvement     # DVT ppx:  - HSC 5000 units Q8 hours    #Goals of Care  - will discuss need for trach and peg with patient's family 2/2 continued concern for aspiration 67F PMH HTN, DMT2, Breast Cancer (diagnosed in Feb 2017) currently on trastuzumab (last dose Jan 20th), originally presented 1/30 with fever found to have influenza subsequently went into PEA arrest x 2 with ROSC, complicated by bilateral pneumothoraces with pneumomediastinum s/p 3 chest tubes, and also new ESRD requiring almost daily HD, readmitted to the MICU for hypoxic respiratory failure likely 2/2 aspiration pneumonia.     # Neuro:  - sedated likely secondary to benadryl  - Known watershed ischemia of the occiput from low flow state during admission 2/2 flu    # CV:  - currently off pressors  - MAPs > 65  - holding antihypertensives in setting of septic shock; normotensive BPs at this time    # Pulm:  - currently intubated for hypoxemic resp fail 2/2 to asp pna  - will continue with CPAP for now and await better mental status  - tolerating CPAP well with adequate tidal volumes  - possible extubation tomorrow  - Titrate O2 down as tolerated to prevent hyperoxia  - c/w treatment of staph aureus (pan sensitive) PNA with nafcillin (day 3); s/p 5 days of vanc/ zosyn; will change nafcillin to ancef for possible drug reaction    # Renal:  - plan for dialysis tian  - Patient pending permacath placement by IR once afebrile and cleared MSSA  - Appreciate renal recommendations    # ID: concern for aspiration PNA with blood culture and sputum positive for staph aureus    - afebrile  - repeat blood cultures NGTD (from 2/24); f/u TTE for eval of vegetation  - patient has staph aureus pneumonia; pan sensitive, completed 5 days of vanc/zosyn; nafcililn day 3- change to ancef for concern of rash  - IR for permacath after echo     # GI:  - continue feeds via NG, Nepro @ 40cc/hr x 24 hrs to provide 960cc fluids recommended by nutrition  - GI ppx with protonix 40mg daily    # Endocrine:  - Continue tube feeds  - Continue with 10U Lantus and FS q6h with low SSI.    # Heme:  - Will continue to trend H/H  - Currently holding Herceptin    # Skin:  - d/c prednisone  - change nafcilin to ancef for possible drug rash reaction     # DVT ppx:  - HSC 5000 units Q8 hours

## 2018-02-26 NOTE — PROGRESS NOTE ADULT - SUBJECTIVE AND OBJECTIVE BOX
Chief Complaint/Follow-up on: T2DM  Subjective: Pt is still intubated but awake. She shakes her head yes at the mention of my name. She also squeezed my hand on command. Daughter at the bedside who notes that mom is a bit drowsy today as she got some benadyl.    MEDICATIONS  (STANDING):  aspirin  chewable 81 milliGRAM(s) Oral daily  dextrose 5%. 1000 milliLiter(s) (50 mL/Hr) IV Continuous <Continuous>  dextrose 5%. 1000 milliLiter(s) (50 mL/Hr) IV Continuous <Continuous>  dextrose 5%. 1000 milliLiter(s) (50 mL/Hr) IV Continuous <Continuous>  dextrose 50% Injectable 12.5 Gram(s) IV Push once  dextrose 50% Injectable 25 Gram(s) IV Push once  dextrose 50% Injectable 25 Gram(s) IV Push once  epoetin odalis Injectable 4000 Unit(s) IV Push <User Schedule>  heparin  Injectable 5000 Unit(s) SubCutaneous every 8 hours  insulin lispro (HumaLOG) corrective regimen sliding scale   SubCutaneous every 6 hours  insulin NPH human recombinant 10 Unit(s) SubCutaneous <User Schedule>  nystatin Powder 1 Application(s) Topical two times a day  pantoprazole  Injectable 40 milliGRAM(s) IV Push daily  triamcinolone 0.1% Ointment 1 Application(s) Topical two times a day    MEDICATIONS  (PRN):  chlorhexidine 0.12% Liquid 15 milliLiter(s) Swish and Spit every 4 hours PRN mouth hygiene  dextrose Gel 1 Dose(s) Oral once PRN Blood Glucose LESS THAN 70 milliGRAM(s)/deciliter  glucagon  Injectable 1 milliGRAM(s) IntraMuscular once PRN Glucose LESS THAN 70 milligrams/deciliter  hydrocortisone 1% Ointment 1 Application(s) Topical every 8 hours PRN Rash and/or Itching      PHYSICAL EXAM:  VITALS: T(C): 37.2 (02-26-18 @ 20:00)  T(F): 99 (02-26-18 @ 20:00), Max: 100.1 (02-25-18 @ 22:00)  HR: 81 (02-26-18 @ 20:00) (69 - 91)  BP: 127/58 (02-26-18 @ 20:00) (112/48 - 171/69)  RR:  (15 - 84)  SpO2:  (98% - 100%)  Wt(kg): --  GENERAL: NAD, well-groomed, well-developed  HEENT:  Atraumatic, Normocephalic, moist mucous membranes  RESPIRATORY: Clear to auscultation bilaterally; No rales, rhonchi, wheezing, or rubs  CARDIOVASCULAR: Regular rate and rhythm; No murmurs; no peripheral edema  GI: Soft, nontender, non distended, normal bowel sounds      POCT Blood Glucose.: 186 mg/dL (02-26-18 @ 17:03)  POCT Blood Glucose.: 261 mg/dL (02-26-18 @ 12:05)  POCT Blood Glucose.: 285 mg/dL (02-26-18 @ 04:57)  POCT Blood Glucose.: 269 mg/dL (02-25-18 @ 23:08)  POCT Blood Glucose.: 333 mg/dL (02-25-18 @ 17:32)  POCT Blood Glucose.: 250 mg/dL (02-25-18 @ 05:04)  POCT Blood Glucose.: 199 mg/dL (02-24-18 @ 23:13)  POCT Blood Glucose.: 170 mg/dL (02-24-18 @ 18:31)  POCT Blood Glucose.: 149 mg/dL (02-24-18 @ 15:30)  POCT Blood Glucose.: 98 mg/dL (02-24-18 @ 11:33)  POCT Blood Glucose.: 172 mg/dL (02-24-18 @ 05:11)  POCT Blood Glucose.: 199 mg/dL (02-23-18 @ 23:40)    02-25    135  |  96  |  49<H>  ----------------------------<  283<H>  3.9   |  24  |  4.07<H>    EGFR if : 12<L>  EGFR if non : 10<L>    Ca    8.6      02-25  Mg     2.4     02-25  Phos  5.6     02-25    TPro  6.3  /  Alb  2.0<L>  /  TBili  0.5  /  DBili  x   /  AST  35  /  ALT  54<H>  /  AlkPhos  342<H>  02-25            Hemoglobin A1C, Whole Blood: 8.6 % <H> [4.0 - 5.6] (01-31-18 @ 07:15)  Hemoglobin A1C, Whole Blood: 8.7 % <H> [4.0 - 5.6] (01-31-18 @ 05:51)

## 2018-02-26 NOTE — CHART NOTE - NSCHARTNOTEFT_GEN_A_CORE
: Ru Singh    INDICATION: hypoxic respiratory failure, shock    PROCEDURE:  [x] LIMITED ECHO  [x] LIMITED CHEST  [ ] LIMITED RETROPERITONEAL  [ ] LIMITED ABDOMINAL  [ ] LIMITED DVT  [ ] NEEDLE GUIDANCE VASCULAR  [ ] NEEDLE GUIDANCE THORACENTESIS  [ ] NEEDLE GUIDANCE PARACENTESIS  [ ] NEEDLE GUIDANCE PERICARDIOCENTESIS  [ ] OTHER    FINDINGS:  Chest: scattered B lines, R focal alveolar consolidation, no b/l pleff  Echo: normal systolic function, no pericardial effusions, no wall motion abnormalities; valves appear grossly normal; approx 2 cm IVC    INTERPRETATION:  Likely has PNA; normal systolic function    ----------------------------------------  Bella Singh MD PGY-4  Pulmonary/Critical Care Fellow  Pager # [phone] (NS), [phone] ([facility])

## 2018-02-26 NOTE — PROGRESS NOTE ADULT - ATTENDING COMMENTS
1.Acute hypoxemic respiratory failure  due to mssa pneumonia and bacteremia. Pt with worsening rash on arms and chest.? Nafcillin. Will change to  Ancef. tolerating PS wean. Somnolent now s/p benadryl. Considering a trial of extubation next 24-48 hours.  2. MSSA bacteremia  cleared. Obtain TTE r/o endocarditis.  3.ESRD For dialysis tomorrow.    cc time 35 min

## 2018-02-26 NOTE — PROGRESS NOTE ADULT - PROBLEM SELECTOR PLAN 1
likely from ATN in setting of cardiac arrest. Pt continues to be oligo-anuric, requiring dialysis. Last HD 2/24. Monitor off HD today. Monitor for any UO. Avoid nephrotoxins.   unclear if skin eruption related to current dialyzer. Will change dialyzers on next HD session.

## 2018-02-27 DIAGNOSIS — E83.41 HYPERMAGNESEMIA: ICD-10-CM

## 2018-02-27 LAB
ALBUMIN SERPL ELPH-MCNC: 2.4 G/DL — LOW (ref 3.3–5)
ALP SERPL-CCNC: 324 U/L — HIGH (ref 40–120)
ALT FLD-CCNC: 35 U/L RC — SIGNIFICANT CHANGE UP (ref 10–45)
ANION GAP SERPL CALC-SCNC: 16 MMOL/L — SIGNIFICANT CHANGE UP (ref 5–17)
AST SERPL-CCNC: 32 U/L — SIGNIFICANT CHANGE UP (ref 10–40)
BILIRUB SERPL-MCNC: 0.3 MG/DL — SIGNIFICANT CHANGE UP (ref 0.2–1.2)
BUN SERPL-MCNC: 64 MG/DL — HIGH (ref 7–23)
CALCIUM SERPL-MCNC: 8.7 MG/DL — SIGNIFICANT CHANGE UP (ref 8.4–10.5)
CHLORIDE SERPL-SCNC: 96 MMOL/L — SIGNIFICANT CHANGE UP (ref 96–108)
CO2 SERPL-SCNC: 24 MMOL/L — SIGNIFICANT CHANGE UP (ref 22–31)
CREAT SERPL-MCNC: 5.09 MG/DL — HIGH (ref 0.5–1.3)
CULTURE RESULTS: SIGNIFICANT CHANGE UP
CULTURE RESULTS: SIGNIFICANT CHANGE UP
GLUCOSE BLDC GLUCOMTR-MCNC: 159 MG/DL — HIGH (ref 70–99)
GLUCOSE BLDC GLUCOMTR-MCNC: 219 MG/DL — HIGH (ref 70–99)
GLUCOSE BLDC GLUCOMTR-MCNC: 223 MG/DL — HIGH (ref 70–99)
GLUCOSE SERPL-MCNC: 203 MG/DL — HIGH (ref 70–99)
MAGNESIUM SERPL-MCNC: 2.7 MG/DL — HIGH (ref 1.6–2.6)
PHOSPHATE SERPL-MCNC: 6.4 MG/DL — HIGH (ref 2.5–4.5)
POTASSIUM SERPL-MCNC: 4 MMOL/L — SIGNIFICANT CHANGE UP (ref 3.5–5.3)
POTASSIUM SERPL-SCNC: 4 MMOL/L — SIGNIFICANT CHANGE UP (ref 3.5–5.3)
PROT SERPL-MCNC: 6.8 G/DL — SIGNIFICANT CHANGE UP (ref 6–8.3)
SODIUM SERPL-SCNC: 136 MMOL/L — SIGNIFICANT CHANGE UP (ref 135–145)
SPECIMEN SOURCE: SIGNIFICANT CHANGE UP

## 2018-02-27 PROCEDURE — 99291 CRITICAL CARE FIRST HOUR: CPT

## 2018-02-27 PROCEDURE — 99232 SBSQ HOSP IP/OBS MODERATE 35: CPT

## 2018-02-27 PROCEDURE — 99233 SBSQ HOSP IP/OBS HIGH 50: CPT | Mod: GC

## 2018-02-27 RX ORDER — MIDODRINE HYDROCHLORIDE 2.5 MG/1
10 TABLET ORAL EVERY OTHER DAY
Qty: 0 | Refills: 0 | Status: DISCONTINUED | OUTPATIENT
Start: 2018-02-27 | End: 2018-03-11

## 2018-02-27 RX ADMIN — Medication 2: at 12:58

## 2018-02-27 RX ADMIN — Medication 4: at 00:28

## 2018-02-27 RX ADMIN — HUMAN INSULIN 10 UNIT(S): 100 INJECTION, SUSPENSION SUBCUTANEOUS at 17:36

## 2018-02-27 RX ADMIN — HUMAN INSULIN 10 UNIT(S): 100 INJECTION, SUSPENSION SUBCUTANEOUS at 00:27

## 2018-02-27 RX ADMIN — HEPARIN SODIUM 5000 UNIT(S): 5000 INJECTION INTRAVENOUS; SUBCUTANEOUS at 22:35

## 2018-02-27 RX ADMIN — Medication 100 MILLIGRAM(S): at 17:25

## 2018-02-27 RX ADMIN — CHLORHEXIDINE GLUCONATE 15 MILLILITER(S): 213 SOLUTION TOPICAL at 05:55

## 2018-02-27 RX ADMIN — HEPARIN SODIUM 5000 UNIT(S): 5000 INJECTION INTRAVENOUS; SUBCUTANEOUS at 13:14

## 2018-02-27 RX ADMIN — Medication 4: at 17:35

## 2018-02-27 RX ADMIN — PANTOPRAZOLE SODIUM 40 MILLIGRAM(S): 20 TABLET, DELAYED RELEASE ORAL at 12:53

## 2018-02-27 RX ADMIN — NYSTATIN CREAM 1 APPLICATION(S): 100000 CREAM TOPICAL at 17:26

## 2018-02-27 RX ADMIN — ERYTHROPOIETIN 4000 UNIT(S): 10000 INJECTION, SOLUTION INTRAVENOUS; SUBCUTANEOUS at 13:08

## 2018-02-27 RX ADMIN — Medication 1 APPLICATION(S): at 05:55

## 2018-02-27 RX ADMIN — Medication 81 MILLIGRAM(S): at 12:53

## 2018-02-27 RX ADMIN — Medication 4: at 05:56

## 2018-02-27 RX ADMIN — Medication 1 APPLICATION(S): at 17:26

## 2018-02-27 RX ADMIN — HEPARIN SODIUM 5000 UNIT(S): 5000 INJECTION INTRAVENOUS; SUBCUTANEOUS at 05:54

## 2018-02-27 RX ADMIN — NYSTATIN CREAM 1 APPLICATION(S): 100000 CREAM TOPICAL at 05:55

## 2018-02-27 RX ADMIN — HUMAN INSULIN 10 UNIT(S): 100 INJECTION, SUSPENSION SUBCUTANEOUS at 12:59

## 2018-02-27 NOTE — PROGRESS NOTE ADULT - SUBJECTIVE AND OBJECTIVE BOX
CHIEF COMPLAINT:Patient is a 68y old  Female who presents with a chief complaint of Fever, total body weakness (02 Feb 2018 13:44)    Interval Events:  Overnight patient with fever of 100.4. Blood cultures taken.     REVIEW OF SYSTEMS:  Constitutional: [ ] negative [ ] fevers [ ] chills [ ] weight loss [ ] weight gain  HEENT: [ ] negative [ ] dry eyes [ ] eye irritation [ ] postnasal drip [ ] nasal congestion  CV: [ ] negative  [ ] chest pain [ ] orthopnea [ ] palpitations [ ] murmur  Resp: [ ] negative [ ] cough [ ] shortness of breath [ ] dyspnea [ ] wheezing [ ] sputum [ ] hemoptysis  GI: [ ] negative [ ] nausea [ ] vomiting [ ] diarrhea [ ] constipation [ ] abd pain [ ] dysphagia   : [ ] negative [ ] dysuria [ ] nocturia [ ] hematuria [ ] increased urinary frequency  Musculoskeletal: [ ] negative [ ] back pain [ ] myalgias [ ] arthralgias [ ] fracture  Skin: [ ] negative [ ] rash [ ] itch  Neurological: [ ] negative [ ] headache [ ] dizziness [ ] syncope [ ] weakness [ ] numbness  Psychiatric: [ ] negative [ ] anxiety [ ] depression  Endocrine: [ ] negative [ ] diabetes [ ] thyroid problem  Hematologic/Lymphatic: [ ] negative [ ] anemia [ ] bleeding problem  Allergic/Immunologic: [ ] negative [ ] itchy eyes [ ] nasal discharge [ ] hives [ ] angioedema  [ ] All other systems negative  [ ] Unable to assess ROS because ________    OBJECTIVE:  ICU Vital Signs Last 24 Hrs  T(C): 38 (27 Feb 2018 04:00), Max: 38 (27 Feb 2018 04:00)  T(F): 100.4 (27 Feb 2018 04:00), Max: 100.4 (27 Feb 2018 04:00)  HR: 81 (27 Feb 2018 07:00) (73 - 91)  BP: 130/61 (27 Feb 2018 07:00) (112/53 - 171/69)  BP(mean): 88 (27 Feb 2018 07:00) (71 - 101)  ABP: --  ABP(mean): --  RR: 27 (27 Feb 2018 07:00) (14 - 38)  SpO2: 100% (27 Feb 2018 07:00) (98% - 100%)    Mode: AC/ CMV (Assist Control/ Continuous Mandatory Ventilation), RR (machine): 14, TV (machine): 450, FiO2: 40, PEEP: 5, ITime: 1, MAP: 9, PIP: 31    02-26 @ 07:01  -  02-27 @ 07:00  --------------------------------------------------------  IN: 700 mL / OUT: 0 mL / NET: 700 mL      CAPILLARY BLOOD GLUCOSE      POCT Blood Glucose.: 219 mg/dL (27 Feb 2018 05:53)      PHYSICAL EXAM:  General:   HEENT:   Lymph Nodes:  Neck:   Respiratory:   Cardiovascular:   Abdomen:   Extremities:   Skin:   Neurological:  Psychiatry:    LINES:    HOSPITAL MEDICATIONS:  Standing Meds:  aspirin  chewable 81 milliGRAM(s) Oral daily  ceFAZolin   IVPB 2000 milliGRAM(s) IV Intermittent every 48 hours  dextrose 5%. 1000 milliLiter(s) IV Continuous <Continuous>  dextrose 5%. 1000 milliLiter(s) IV Continuous <Continuous>  dextrose 5%. 1000 milliLiter(s) IV Continuous <Continuous>  dextrose 50% Injectable 12.5 Gram(s) IV Push once  dextrose 50% Injectable 25 Gram(s) IV Push once  dextrose 50% Injectable 25 Gram(s) IV Push once  epoetin odalis Injectable 4000 Unit(s) IV Push <User Schedule>  heparin  Injectable 5000 Unit(s) SubCutaneous every 8 hours  insulin lispro (HumaLOG) corrective regimen sliding scale   SubCutaneous every 6 hours  insulin NPH human recombinant 10 Unit(s) SubCutaneous <User Schedule>  nystatin Powder 1 Application(s) Topical two times a day  pantoprazole  Injectable 40 milliGRAM(s) IV Push daily  triamcinolone 0.1% Ointment 1 Application(s) Topical two times a day      PRN Meds:  chlorhexidine 0.12% Liquid 15 milliLiter(s) Swish and Spit every 4 hours PRN  dextrose Gel 1 Dose(s) Oral once PRN  glucagon  Injectable 1 milliGRAM(s) IntraMuscular once PRN  hydrocortisone 1% Ointment 1 Application(s) Topical every 8 hours PRN      LABS:                        7.2    7.9   )-----------( 291      ( 26 Feb 2018 23:45 )             23.5     Hgb Trend: 7.2<--, 7.1<--, 7.2<--, 7.4<--, 7.4<--  02-26    136  |  96  |  64<H>  ----------------------------<  203<H>  4.0   |  24  |  5.09<H>    Ca    8.7      26 Feb 2018 23:45  Phos  6.4     02-26  Mg     2.7     02-26    TPro  6.8  /  Alb  2.4<L>  /  TBili  0.3  /  DBili  x   /  AST  32  /  ALT  35  /  AlkPhos  324<H>  02-26    Creatinine Trend: 5.09<--, 4.07<--, 2.73<--, 3.91<--, 2.57<--, 3.71<--  PT/INR - ( 26 Feb 2018 23:44 )   PT: 10.7 sec;   INR: 0.98 ratio         PTT - ( 26 Feb 2018 23:44 )  PTT:29.6 sec          MICROBIOLOGY:     Culture - Blood (collected 24 Feb 2018 15:30)  Source: .Blood Blood-Venous  Preliminary Report (25 Feb 2018 16:00):    No growth to date.    Culture - Blood (collected 24 Feb 2018 15:30)  Source: .Blood Blood-Peripheral  Preliminary Report (25 Feb 2018 16:00):    No growth to date.      RADIOLOGY:  [ ] Reviewed and interpreted by me    EKG: CHIEF COMPLAINT:Patient is a 68y old  Female who presents with a chief complaint of Fever, total body weakness (02 Feb 2018 13:44)    Interval Events:  Overnight patient with fever of 100.4. Blood cultures taken. No other acute events.   Patient is waking up interactive this morning, following commands. Denies any pain.    REVIEW OF SYSTEMS:  Constitutional: [X ] negative [ ] fevers [ ] chills [ ] weight loss [ ] weight gain  HEENT: [ ] negative [ ] dry eyes [ ] eye irritation [ ] postnasal drip [ ] nasal congestion  CV: [ ] negative  [ ] chest pain [ ] orthopnea [ ] palpitations [ ] murmur  Resp: [ ] negative [ ] cough [ ] shortness of breath [ ] dyspnea [ ] wheezing [ ] sputum [ ] hemoptysis  GI: [ ] negative [ ] nausea [ ] vomiting [ ] diarrhea [ ] constipation [ ] abd pain [ ] dysphagia   : [ ] negative [ ] dysuria [ ] nocturia [ ] hematuria [ ] increased urinary frequency  Musculoskeletal: [ ] negative [ ] back pain [ ] myalgias [ ] arthralgias [ ] fracture  Skin: [ ] negative [ ] rash [ ] itch  Neurological: [ ] negative [ ] headache [ ] dizziness [ ] syncope [ ] weakness [ ] numbness  Psychiatric: [ ] negative [ ] anxiety [ ] depression  Endocrine: [ ] negative [ ] diabetes [ ] thyroid problem  Hematologic/Lymphatic: [ ] negative [ ] anemia [ ] bleeding problem  Allergic/Immunologic: [ ] negative [ ] itchy eyes [ ] nasal discharge [ ] hives [ ] angioedema  [ ] All other systems negative  [ ] Unable to assess ROS because ________  * Difficult to assess     OBJECTIVE:  ICU Vital Signs Last 24 Hrs  T(C): 38 (27 Feb 2018 04:00), Max: 38 (27 Feb 2018 04:00)  T(F): 100.4 (27 Feb 2018 04:00), Max: 100.4 (27 Feb 2018 04:00)  HR: 81 (27 Feb 2018 07:00) (73 - 91)  BP: 130/61 (27 Feb 2018 07:00) (112/53 - 171/69)  BP(mean): 88 (27 Feb 2018 07:00) (71 - 101)  ABP: --  ABP(mean): --  RR: 27 (27 Feb 2018 07:00) (14 - 38)  SpO2: 100% (27 Feb 2018 07:00) (98% - 100%)    Mode: AC/ CMV (Assist Control/ Continuous Mandatory Ventilation), RR (machine): 14, TV (machine): 450, FiO2: 40, PEEP: 5, ITime: 1, MAP: 9, PIP: 31    02-26 @ 07:01  -  02-27 @ 07:00  --------------------------------------------------------  IN: 700 mL / OUT: 0 mL / NET: 700 mL      CAPILLARY BLOOD GLUCOSE      POCT Blood Glucose.: 219 mg/dL (27 Feb 2018 05:53)      PHYSICAL EXAM:  General:   HEENT:   Lymph Nodes:  Neck:   Respiratory:   Cardiovascular:   Abdomen:   Extremities:   Skin:   Neurological:  Psychiatry:    LINES:    HOSPITAL MEDICATIONS:  Standing Meds:  aspirin  chewable 81 milliGRAM(s) Oral daily  ceFAZolin   IVPB 2000 milliGRAM(s) IV Intermittent every 48 hours  dextrose 5%. 1000 milliLiter(s) IV Continuous <Continuous>  dextrose 5%. 1000 milliLiter(s) IV Continuous <Continuous>  dextrose 5%. 1000 milliLiter(s) IV Continuous <Continuous>  dextrose 50% Injectable 12.5 Gram(s) IV Push once  dextrose 50% Injectable 25 Gram(s) IV Push once  dextrose 50% Injectable 25 Gram(s) IV Push once  epoetin odalis Injectable 4000 Unit(s) IV Push <User Schedule>  heparin  Injectable 5000 Unit(s) SubCutaneous every 8 hours  insulin lispro (HumaLOG) corrective regimen sliding scale   SubCutaneous every 6 hours  insulin NPH human recombinant 10 Unit(s) SubCutaneous <User Schedule>  nystatin Powder 1 Application(s) Topical two times a day  pantoprazole  Injectable 40 milliGRAM(s) IV Push daily  triamcinolone 0.1% Ointment 1 Application(s) Topical two times a day      PRN Meds:  chlorhexidine 0.12% Liquid 15 milliLiter(s) Swish and Spit every 4 hours PRN  dextrose Gel 1 Dose(s) Oral once PRN  glucagon  Injectable 1 milliGRAM(s) IntraMuscular once PRN  hydrocortisone 1% Ointment 1 Application(s) Topical every 8 hours PRN      LABS:                        7.2    7.9   )-----------( 291      ( 26 Feb 2018 23:45 )             23.5     Hgb Trend: 7.2<--, 7.1<--, 7.2<--, 7.4<--, 7.4<--  02-26    136  |  96  |  64<H>  ----------------------------<  203<H>  4.0   |  24  |  5.09<H>    Ca    8.7      26 Feb 2018 23:45  Phos  6.4     02-26  Mg     2.7     02-26    TPro  6.8  /  Alb  2.4<L>  /  TBili  0.3  /  DBili  x   /  AST  32  /  ALT  35  /  AlkPhos  324<H>  02-26    Creatinine Trend: 5.09<--, 4.07<--, 2.73<--, 3.91<--, 2.57<--, 3.71<--  PT/INR - ( 26 Feb 2018 23:44 )   PT: 10.7 sec;   INR: 0.98 ratio         PTT - ( 26 Feb 2018 23:44 )  PTT:29.6 sec          MICROBIOLOGY:     Culture - Blood (collected 24 Feb 2018 15:30)  Source: .Blood Blood-Venous  Preliminary Report (25 Feb 2018 16:00):    No growth to date.    Culture - Blood (collected 24 Feb 2018 15:30)  Source: .Blood Blood-Peripheral  Preliminary Report (25 Feb 2018 16:00):    No growth to date.      RADIOLOGY:  [ ] Reviewed and interpreted by me    EKG: CHIEF COMPLAINT:Patient is a 68y old  Female who presents with a chief complaint of Fever, total body weakness (02 Feb 2018 13:44)    Interval Events:  Overnight patient with fever of 100.4. Blood cultures taken. Patient became apneic on CPAP overnight, will retry CPAP this morning.   Patient is waking up interactive this morning, following commands. Denies any pain.      REVIEW OF SYSTEMS:  Constitutional: [X ] negative [ ] fevers [ ] chills [ ] weight loss [ ] weight gain  HEENT: [ ] negative [ ] dry eyes [ ] eye irritation [ ] postnasal drip [ ] nasal congestion  CV: [ ] negative  [ ] chest pain [ ] orthopnea [ ] palpitations [ ] murmur  Resp: [ ] negative [ ] cough [ ] shortness of breath [ ] dyspnea [ ] wheezing [ ] sputum [ ] hemoptysis  GI: [ ] negative [ ] nausea [ ] vomiting [ ] diarrhea [ ] constipation [ ] abd pain [ ] dysphagia   : [ ] negative [ ] dysuria [ ] nocturia [ ] hematuria [ ] increased urinary frequency  Musculoskeletal: [ ] negative [ ] back pain [ ] myalgias [ ] arthralgias [ ] fracture  Skin: [ ] negative [ ] rash [ ] itch  Neurological: [ ] negative [ ] headache [ ] dizziness [ ] syncope [ ] weakness [ ] numbness  Psychiatric: [ ] negative [ ] anxiety [ ] depression  Endocrine: [ ] negative [ ] diabetes [ ] thyroid problem  Hematologic/Lymphatic: [ ] negative [ ] anemia [ ] bleeding problem  Allergic/Immunologic: [ ] negative [ ] itchy eyes [ ] nasal discharge [ ] hives [ ] angioedema  [ ] All other systems negative  [ ] Unable to assess ROS because ________  * Difficult to assess as patient intubated and agitated, but does not complain of pain.     OBJECTIVE:  ICU Vital Signs Last 24 Hrs  T(C): 38 (27 Feb 2018 04:00), Max: 38 (27 Feb 2018 04:00)  T(F): 100.4 (27 Feb 2018 04:00), Max: 100.4 (27 Feb 2018 04:00)  HR: 81 (27 Feb 2018 07:00) (73 - 91)  BP: 130/61 (27 Feb 2018 07:00) (112/53 - 171/69)  BP(mean): 88 (27 Feb 2018 07:00) (71 - 101)  ABP: --  ABP(mean): --  RR: 27 (27 Feb 2018 07:00) (14 - 38)  SpO2: 100% (27 Feb 2018 07:00) (98% - 100%)    Mode: AC/ CMV (Assist Control/ Continuous Mandatory Ventilation), RR (machine): 14, TV (machine): 450, FiO2: 40, PEEP: 5, ITime: 1, MAP: 9, PIP: 31    02-26 @ 07:01  -  02-27 @ 07:00  --------------------------------------------------------  IN: 700 mL / OUT: 0 mL / NET: 700 mL      CAPILLARY BLOOD GLUCOSE      POCT Blood Glucose.: 219 mg/dL (27 Feb 2018 05:53)      General: intubated, NAD  Neurology: alert, following comands  Head:  Normocephalic, atraumatic  ENT:  Mucosa moist, no ulcerations  Neck:  Supple, no sinuses or palpable masses  Respiratory: CTA B/L  CV: RRR, S1S2, no murmur  Abdominal: Soft, NT, ND no palpable mass  MSK: No edema, + peripheral pulses    LINES:    HOSPITAL MEDICATIONS:  Standing Meds:  aspirin  chewable 81 milliGRAM(s) Oral daily  ceFAZolin   IVPB 2000 milliGRAM(s) IV Intermittent every 48 hours  dextrose 5%. 1000 milliLiter(s) IV Continuous <Continuous>  dextrose 5%. 1000 milliLiter(s) IV Continuous <Continuous>  dextrose 5%. 1000 milliLiter(s) IV Continuous <Continuous>  dextrose 50% Injectable 12.5 Gram(s) IV Push once  dextrose 50% Injectable 25 Gram(s) IV Push once  dextrose 50% Injectable 25 Gram(s) IV Push once  epoetin odalis Injectable 4000 Unit(s) IV Push <User Schedule>  heparin  Injectable 5000 Unit(s) SubCutaneous every 8 hours  insulin lispro (HumaLOG) corrective regimen sliding scale   SubCutaneous every 6 hours  insulin NPH human recombinant 10 Unit(s) SubCutaneous <User Schedule>  nystatin Powder 1 Application(s) Topical two times a day  pantoprazole  Injectable 40 milliGRAM(s) IV Push daily  triamcinolone 0.1% Ointment 1 Application(s) Topical two times a day      PRN Meds:  chlorhexidine 0.12% Liquid 15 milliLiter(s) Swish and Spit every 4 hours PRN  dextrose Gel 1 Dose(s) Oral once PRN  glucagon  Injectable 1 milliGRAM(s) IntraMuscular once PRN  hydrocortisone 1% Ointment 1 Application(s) Topical every 8 hours PRN      LABS:                        7.2    7.9   )-----------( 291      ( 26 Feb 2018 23:45 )             23.5     Hgb Trend: 7.2<--, 7.1<--, 7.2<--, 7.4<--, 7.4<--  02-26    136  |  96  |  64<H>  ----------------------------<  203<H>  4.0   |  24  |  5.09<H>    Ca    8.7      26 Feb 2018 23:45  Phos  6.4     02-26  Mg     2.7     02-26    TPro  6.8  /  Alb  2.4<L>  /  TBili  0.3  /  DBili  x   /  AST  32  /  ALT  35  /  AlkPhos  324<H>  02-26    Creatinine Trend: 5.09<--, 4.07<--, 2.73<--, 3.91<--, 2.57<--, 3.71<--  PT/INR - ( 26 Feb 2018 23:44 )   PT: 10.7 sec;   INR: 0.98 ratio         PTT - ( 26 Feb 2018 23:44 )  PTT:29.6 sec          MICROBIOLOGY:     Culture - Blood (collected 24 Feb 2018 15:30)  Source: .Blood Blood-Venous  Preliminary Report (25 Feb 2018 16:00):    No growth to date.    Culture - Blood (collected 24 Feb 2018 15:30)  Source: .Blood Blood-Peripheral  Preliminary Report (25 Feb 2018 16:00):    No growth to date.      RADIOLOGY:  [ ] Reviewed and interpreted by me    EKG:

## 2018-02-27 NOTE — PROGRESS NOTE ADULT - SUBJECTIVE AND OBJECTIVE BOX
Chief Complaint/Follow-up on:  T2DM    Subjective: Pt still intubated but opens eyes and shakes her head appropriately when asked of my identity.  at the bedside.    MEDICATIONS  (STANDING):  aspirin  chewable 81 milliGRAM(s) Oral daily  ceFAZolin   IVPB 2000 milliGRAM(s) IV Intermittent every 48 hours  dextrose 5%. 1000 milliLiter(s) (50 mL/Hr) IV Continuous <Continuous>  dextrose 5%. 1000 milliLiter(s) (50 mL/Hr) IV Continuous <Continuous>  dextrose 5%. 1000 milliLiter(s) (50 mL/Hr) IV Continuous <Continuous>  dextrose 50% Injectable 12.5 Gram(s) IV Push once  dextrose 50% Injectable 25 Gram(s) IV Push once  dextrose 50% Injectable 25 Gram(s) IV Push once  epoetin odalis Injectable 4000 Unit(s) IV Push <User Schedule>  heparin  Injectable 5000 Unit(s) SubCutaneous every 8 hours  insulin lispro (HumaLOG) corrective regimen sliding scale   SubCutaneous every 6 hours  insulin NPH human recombinant 10 Unit(s) SubCutaneous <User Schedule>  nystatin Powder 1 Application(s) Topical two times a day  pantoprazole  Injectable 40 milliGRAM(s) IV Push daily  triamcinolone 0.1% Ointment 1 Application(s) Topical two times a day    MEDICATIONS  (PRN):  chlorhexidine 0.12% Liquid 15 milliLiter(s) Swish and Spit every 4 hours PRN mouth hygiene  dextrose Gel 1 Dose(s) Oral once PRN Blood Glucose LESS THAN 70 milliGRAM(s)/deciliter  glucagon  Injectable 1 milliGRAM(s) IntraMuscular once PRN Glucose LESS THAN 70 milligrams/deciliter  hydrocortisone 1% Ointment 1 Application(s) Topical every 8 hours PRN Rash and/or Itching      PHYSICAL EXAM:  VITALS: T(C): 37.2 (02-27-18 @ 11:40)  T(F): 99 (02-27-18 @ 11:40), Max: 100.4 (02-27-18 @ 04:00)  HR: 83 (02-27-18 @ 13:30) (71 - 91)  BP: 92/43 (02-27-18 @ 13:30) (85/42 - 171/69)  RR:  (14 - 38)  SpO2:  (95% - 100%)  Wt(kg): --  GENERAL: NAD, well-groomed, well-developed  HEENT:  Atraumatic, Normocephalic, moist mucous membranes  RESPIRATORY: Clear to auscultation bilaterally; No rales, rhonchi, wheezing, or rubs  CARDIOVASCULAR: Regular rate and rhythm; No murmurs; no peripheral edema  GI: Soft, nontender, non distended, normal bowel sounds    POCT Blood Glucose.: 159 mg/dL (02-27-18 @ 12:55)  POCT Blood Glucose.: 219 mg/dL (02-27-18 @ 05:53)  POCT Blood Glucose.: 186 mg/dL (02-26-18 @ 17:03)  POCT Blood Glucose.: 261 mg/dL (02-26-18 @ 12:05)  POCT Blood Glucose.: 285 mg/dL (02-26-18 @ 04:57)  POCT Blood Glucose.: 269 mg/dL (02-25-18 @ 23:08)  POCT Blood Glucose.: 333 mg/dL (02-25-18 @ 17:32)  POCT Blood Glucose.: 250 mg/dL (02-25-18 @ 05:04)  POCT Blood Glucose.: 199 mg/dL (02-24-18 @ 23:13)  POCT Blood Glucose.: 170 mg/dL (02-24-18 @ 18:31)  POCT Blood Glucose.: 149 mg/dL (02-24-18 @ 15:30)    02-26    136  |  96  |  64<H>  ----------------------------<  203<H>  4.0   |  24  |  5.09<H>    EGFR if : 9<L>  EGFR if non : 8<L>    Ca    8.7      02-26  Mg     2.7     02-26  Phos  6.4     02-26    TPro  6.8  /  Alb  2.4<L>  /  TBili  0.3  /  DBili  x   /  AST  32  /  ALT  35  /  AlkPhos  324<H>  02-26      Hemoglobin A1C, Whole Blood: 8.6 % <H> [4.0 - 5.6] (01-31-18 @ 07:15)  Hemoglobin A1C, Whole Blood: 8.7 % <H> [4.0 - 5.6] (01-31-18 @ 05:51)

## 2018-02-27 NOTE — PROGRESS NOTE ADULT - SUBJECTIVE AND OBJECTIVE BOX
[facility] DIVISION OF KIDNEY DISEASES AND HYPERTENSION -- FOLLOW UP NOTE  --------------------------------------------------------------------------------  Chief Complaint; RUSS    24 hour events/subjective:  febrile overnight.        PAST HISTORY  --------------------------------------------------------------------------------  No significant changes to PMH, PSH, FHx, SHx, unless otherwise noted    ALLERGIES & MEDICATIONS  --------------------------------------------------------------------------------  Allergies    doxycycline (Rash)  isoniazid (Rash)  NIFEdipine (Urticaria; Hives)  vitamin E (Short breath; Urticaria; Hives)    Intolerances      Standing Inpatient Medications  aspirin  chewable 81 milliGRAM(s) Oral daily  ceFAZolin   IVPB 2000 milliGRAM(s) IV Intermittent every 48 hours  dextrose 5%. 1000 milliLiter(s) IV Continuous <Continuous>  dextrose 5%. 1000 milliLiter(s) IV Continuous <Continuous>  dextrose 5%. 1000 milliLiter(s) IV Continuous <Continuous>  dextrose 50% Injectable 12.5 Gram(s) IV Push once  dextrose 50% Injectable 25 Gram(s) IV Push once  dextrose 50% Injectable 25 Gram(s) IV Push once  epoetin odalis Injectable 4000 Unit(s) IV Push <User Schedule>  heparin  Injectable 5000 Unit(s) SubCutaneous every 8 hours  insulin lispro (HumaLOG) corrective regimen sliding scale   SubCutaneous every 6 hours  insulin NPH human recombinant 10 Unit(s) SubCutaneous <User Schedule>  nystatin Powder 1 Application(s) Topical two times a day  pantoprazole  Injectable 40 milliGRAM(s) IV Push daily  triamcinolone 0.1% Ointment 1 Application(s) Topical two times a day    PRN Inpatient Medications  chlorhexidine 0.12% Liquid 15 milliLiter(s) Swish and Spit every 4 hours PRN  dextrose Gel 1 Dose(s) Oral once PRN  glucagon  Injectable 1 milliGRAM(s) IntraMuscular once PRN  hydrocortisone 1% Ointment 1 Application(s) Topical every 8 hours PRN      REVIEW OF SYSTEMS  --------------------------------------------------------------------------------  unable to obtain    VITALS/PHYSICAL EXAM  --------------------------------------------------------------------------------  T(C): 37.2 (02-27-18 @ 08:00), Max: 38 (02-27-18 @ 04:00)  HR: 82 (02-27-18 @ 08:00) (73 - 91)  BP: 149/67 (02-27-18 @ 08:00) (112/53 - 171/69)  RR: 14 (02-27-18 @ 08:00) (14 - 38)  SpO2: 100% (02-27-18 @ 08:00) (98% - 100%)  Wt(kg): --        02-26-18 @ 07:01  -  02-27-18 @ 07:00  --------------------------------------------------------  IN: 700 mL / OUT: 0 mL / NET: 700 mL    02-27-18 @ 07:01  -  02-27-18 @ 08:34  --------------------------------------------------------  IN: 0 mL / OUT: 0 mL / NET: 0 mL      Physical Exam:    LABS/STUDIES  --------------------------------------------------------------------------------              7.2    7.9   >-----------<  291      [02-26-18 @ 23:45]              23.5     136  |  96  |  64  ----------------------------<  203      [02-26-18 @ 23:45]  4.0   |  24  |  5.09        Ca     8.7     [02-26-18 @ 23:45]      Mg     2.7     [02-26-18 @ 23:45]      Phos  6.4     [02-26-18 @ 23:45]    TPro  6.8  /  Alb  2.4  /  TBili  0.3  /  DBili  x   /  AST  32  /  ALT  35  /  AlkPhos  324  [02-26-18 @ 23:45]    PT/INR: PT 10.7 , INR 0.98       [02-26-18 @ 23:44]  PTT: 29.6       [02-26-18 @ 23:44]      Creatinine Trend:  SCr 5.09 [02-26 @ 23:45]  SCr 4.07 [02-25 @ 22:49]  SCr 2.73 [02-24 @ 23:24]  SCr 3.91 [02-24 @ 00:00]  SCr 2.57 [02-22 @ 23:44]    Urinalysis - [01-30-18 @ 12:52]      Color Yellow / Appearance SL Turbid / SG 1.025 / pH 6.0      Gluc 50 / Ketone Negative  / Bili Negative / Urobili Negative       Blood Trace / Protein 150 / Leuk Est Negative / Nitrite Negative      RBC 3-5 / WBC  / Hyaline  / Gran  / Sq Epi  / Non Sq Epi OCC / Bacteria       HbA1c 8.6      [01-31-18 @ 07:15]  Lipid: chol 183, , HDL 10,       [02-07-18 @ 13:34]    HBsAg Nonreact      [02-01-18 @ 22:49]  HCV 0.16, Nonreact      [02-01-18 @ 22:49] [facility] DIVISION OF KIDNEY DISEASES AND HYPERTENSION -- FOLLOW UP NOTE  --------------------------------------------------------------------------------  Chief Complaint; RUSS    24 hour events/subjective:  febrile overnight.        PAST HISTORY  --------------------------------------------------------------------------------  No significant changes to PMH, PSH, FHx, SHx, unless otherwise noted    ALLERGIES & MEDICATIONS  --------------------------------------------------------------------------------  Allergies    doxycycline (Rash)  isoniazid (Rash)  NIFEdipine (Urticaria; Hives)  vitamin E (Short breath; Urticaria; Hives)    Intolerances      Standing Inpatient Medications  aspirin  chewable 81 milliGRAM(s) Oral daily  ceFAZolin   IVPB 2000 milliGRAM(s) IV Intermittent every 48 hours  dextrose 5%. 1000 milliLiter(s) IV Continuous <Continuous>  dextrose 5%. 1000 milliLiter(s) IV Continuous <Continuous>  dextrose 5%. 1000 milliLiter(s) IV Continuous <Continuous>  dextrose 50% Injectable 12.5 Gram(s) IV Push once  dextrose 50% Injectable 25 Gram(s) IV Push once  dextrose 50% Injectable 25 Gram(s) IV Push once  epoetin odalis Injectable 4000 Unit(s) IV Push <User Schedule>  heparin  Injectable 5000 Unit(s) SubCutaneous every 8 hours  insulin lispro (HumaLOG) corrective regimen sliding scale   SubCutaneous every 6 hours  insulin NPH human recombinant 10 Unit(s) SubCutaneous <User Schedule>  nystatin Powder 1 Application(s) Topical two times a day  pantoprazole  Injectable 40 milliGRAM(s) IV Push daily  triamcinolone 0.1% Ointment 1 Application(s) Topical two times a day    PRN Inpatient Medications  chlorhexidine 0.12% Liquid 15 milliLiter(s) Swish and Spit every 4 hours PRN  dextrose Gel 1 Dose(s) Oral once PRN  glucagon  Injectable 1 milliGRAM(s) IntraMuscular once PRN  hydrocortisone 1% Ointment 1 Application(s) Topical every 8 hours PRN      REVIEW OF SYSTEMS  --------------------------------------------------------------------------------  unable to obtain    VITALS/PHYSICAL EXAM  --------------------------------------------------------------------------------  T(C): 37.2 (02-27-18 @ 08:00), Max: 38 (02-27-18 @ 04:00)  HR: 82 (02-27-18 @ 08:00) (73 - 91)  BP: 149/67 (02-27-18 @ 08:00) (112/53 - 171/69)  RR: 14 (02-27-18 @ 08:00) (14 - 38)  SpO2: 100% (02-27-18 @ 08:00) (98% - 100%)  Wt(kg): --        02-26-18 @ 07:01  -  02-27-18 @ 07:00  --------------------------------------------------------  IN: 700 mL / OUT: 0 mL / NET: 700 mL    02-27-18 @ 07:01  -  02-27-18 @ 08:34  --------------------------------------------------------  IN: 0 mL / OUT: 0 mL / NET: 0 mL      Physical Exam:  GEn- intubated  Chest- vent sounds  Heart- S1/S2 rrr  Abdomen- soft, nd  Extremities- no edema    LABS/STUDIES  --------------------------------------------------------------------------------              7.2    7.9   >-----------<  291      [02-26-18 @ 23:45]              23.5     136  |  96  |  64  ----------------------------<  203      [02-26-18 @ 23:45]  4.0   |  24  |  5.09        Ca     8.7     [02-26-18 @ 23:45]      Mg     2.7     [02-26-18 @ 23:45]      Phos  6.4     [02-26-18 @ 23:45]    TPro  6.8  /  Alb  2.4  /  TBili  0.3  /  DBili  x   /  AST  32  /  ALT  35  /  AlkPhos  324  [02-26-18 @ 23:45]    PT/INR: PT 10.7 , INR 0.98       [02-26-18 @ 23:44]  PTT: 29.6       [02-26-18 @ 23:44]      Creatinine Trend:  SCr 5.09 [02-26 @ 23:45]  SCr 4.07 [02-25 @ 22:49]  SCr 2.73 [02-24 @ 23:24]  SCr 3.91 [02-24 @ 00:00]  SCr 2.57 [02-22 @ 23:44]    Urinalysis - [01-30-18 @ 12:52]      Color Yellow / Appearance SL Turbid / SG 1.025 / pH 6.0      Gluc 50 / Ketone Negative  / Bili Negative / Urobili Negative       Blood Trace / Protein 150 / Leuk Est Negative / Nitrite Negative      RBC 3-5 / WBC  / Hyaline  / Gran  / Sq Epi  / Non Sq Epi OCC / Bacteria       HbA1c 8.6      [01-31-18 @ 07:15]  Lipid: chol 183, , HDL 10,       [02-07-18 @ 13:34]    HBsAg Nonreact      [02-01-18 @ 22:49]  HCV 0.16, Nonreact      [02-01-18 @ 22:49]

## 2018-02-27 NOTE — PROGRESS NOTE ADULT - ASSESSMENT
ACute hypoxemic  resp failure: clinically improved, and undergoing PS weaning trials  S Aureus bacteremia: septic shock, hypotension now resolved  ESRD  R sided aspiration pneumonia: partial radiographic clearing  S/P CAC with rib fractures and bilateral pneumothoraces  CVA with corical and subcortical infarcts  s/p cardiac arrest    REC    Continue antibiotics   CPAP/PS trials with nocturnal rest on CMV: extuibation trial per clinical status  COmplete antibiotics  HD per renal  Aspiration prcautions: may still need PEG - re-assess

## 2018-02-27 NOTE — PROGRESS NOTE ADULT - SUBJECTIVE AND OBJECTIVE BOX
Follow-up Pulm Progress Note  Caleb Mon MD  [phone]    Events noted  Extubation deferred  Hemodynamically stable undergoing bedside HD  On CPAP/PS 10/5  Lethargic today    Vital Signs Last 24 Hrs  T(C): 37.1 (26 Feb 2018 08:00), Max: 37.8 (25 Feb 2018 22:00)  T(F): 98.8 (26 Feb 2018 08:00), Max: 100.1 (25 Feb 2018 22:00)  HR: 87 (26 Feb 2018 10:00) (69 - 90)  BP: 130/58 (26 Feb 2018 10:00) (107/49 - 156/71)  BP(mean): 83 (26 Feb 2018 10:00) (69 - 102)  RR: 23 (26 Feb 2018 10:00) (16 - 84)  SpO2: 100% (26 Feb 2018 10:00) (100% - 100%)                       7.1    5.6   )-----------( 237      ( 25 Feb 2018 22:49 )             21.9       02-25    135  |  96  |  49<H>  ----------------------------<  283<H>  3.9   |  24  |  4.07<H>    Ca    8.6      25 Feb 2018 22:49  Phos  5.6     02-25  Mg     2.4     02-25    TPro  6.3  /  Alb  2.0<L>  /  TBili  0.5  /  DBili  x   /  AST  35  /  ALT  54<H>  /  AlkPhos  342<H>  02-25    CULTURES:    Physical Examination:  PULM: scattered rhonchi  CVS: Regular rate and rhythm, no murmurs, rubs, or gallops  ABD: Soft, non-tender  EXT:  No clubbing, cyanosis, or edema    RADIOLOGY REVIEWED  CXR: Extensive R sided opacities c/w pneumonia    CT chest:    TTE:

## 2018-02-27 NOTE — PROGRESS NOTE ADULT - ASSESSMENT
67F PMH HTN, DMT2, Breast Cancer (diagnosed in Feb 2017) currently on trastuzumab (last dose Jan 20th), originally presented 1/30 with fever found to have influenza subsequently went into PEA arrest x 2 with ROSC, complicated by bilateral pneumothoraces with pneumomediastinum s/p 3 chest tubes, and also new ESRD requiring almost daily HD, readmitted to the MICU for hypoxic respiratory failure likely 2/2 aspiration pneumonia.     # Neuro:      - Known watershed ischemia of the occiput from low flow state during admission 2/2 flu    # CV:  - currently off pressors  - MAPs > 65  - holding antihypertensives in setting of septic shock; normotensive BPs at this time (120s-130s systolics)    # Pulm:  - currently intubated for hypoxemic resp fail 2/2 to asp pna  - tolerating CPAP well overnight  - mental status ______  - s/p 5 days of vanc/ zosyn, 3 days of nafcillin, now on Ancef (swtiched from nafcillin as patient may have allergic reaction to naf)- dose ancef with dialysis     # Renal:  - plan for dialysis today  - Patient pending permacath placement by IR once afebrile and cleared MSSA  - Appreciate renal recommendations    # ID: concern for aspiration PNA with blood culture and sputum positive for staph aureus    - febrile overnight- follow up bcx from today; last BCX from 2/24 shows clearance of infection  - MSSA bactermia- s/p 5 days of vanc/ zosyn, 3 days of nafcillin, now on Ancef (swtiched from nafcillin as patient may have allergic reaction to naf)- dose ancef with dialysis   - IR for permacath after new set of BCx are neg  - echo shows no signs of vegetation    # GI:  - continue feeds via NG, Nepro @ 40cc/hr x 24 hrs to provide 960cc fluids recommended by nutrition  - GI ppx with protonix 40mg daily    # Endocrine:  - Continue tube feeds  - Continue with 10U Lantus and FS q6h with low SSI.    # Heme:  - Will continue to trend H/H  - Currently holding Herceptin    # Skin:  - change nafcilin to ancef for possible drug rash reaction     # DVT ppx:  - HSC 5000 units Q8 hours 67F PMH HTN, DMT2, Breast Cancer (diagnosed in Feb 2017) currently on trastuzumab (last dose Jan 20th), originally presented 1/30 with fever found to have influenza subsequently went into PEA arrest x 2 with ROSC, complicated by bilateral pneumothoraces with pneumomediastinum s/p 3 chest tubes, and also new ESRD requiring almost daily HD, readmitted to the MICU for hypoxic respiratory failure likely 2/2 aspiration pneumonia.     # Neuro:  - patient alert and following commands, but goes in and out of sleep  - Known watershed ischemia of the occiput from low flow state during admission 2/2 flu    # CV:  - currently off pressors  - MAPs > 65  - holding antihypertensives in setting of septic shock; normotensive BPs at this time (120s-130s systolics)    # Pulm:  - currently intubated for hypoxemic resp fail 2/2 to asp pna  - mental status improved  - c/w CPAP today and continue with ancef for PNA treatment  - s/p 5 days of vanc/ zosyn, 3 days of nafcillin, now on Ancef (swtiched from nafcillin as patient may have allergic reaction to naf)- dose ancef with dialysis     # Renal:  - plan for dialysis today  - Patient pending permacath placement by IR once afebrile and cleared MSSA  - Appreciate renal recommendations    # ID: concern for aspiration PNA with blood culture and sputum positive for staph aureus    - febrile overnight- follow up bcx from today; last BCX from 2/24 shows clearance of infection  - MSSA bactermia- s/p 5 days of vanc/ zosyn, 3 days of nafcillin, now on Ancef (swtiched from nafcillin as patient may have allergic reaction to naf)- dose ancef with dialysis   - IR for permacath after new set of BCx are neg  - echo shows no signs of vegetation    # GI:  - continue feeds via NG, Nepro @ 40cc/hr x 24 hrs to provide 960cc fluids recommended by nutrition  - GI ppx with protonix 40mg daily    # Endocrine:  - Continue tube feeds  - Continue with 10U Lantus and FS q6h with low SSI.    # Heme:  - Will continue to trend H/H  - Currently holding Herceptin    # Skin:  - improvement in pruritits, no changes in rash  - continue to monitor     # DVT ppx:  - HSC 5000 units Q8 hours 67F PMH HTN, DMT2, Breast Cancer (diagnosed in Feb 2017) currently on trastuzumab (last dose Jan 20th), originally presented 1/30 with fever found to have influenza subsequently went into PEA arrest x 2 with ROSC, complicated by bilateral pneumothoraces with pneumomediastinum s/p 3 chest tubes, and also new ESRD requiring almost daily HD, readmitted to the MICU for hypoxic respiratory failure likely 2/2 aspiration pneumonia.     # Neuro:  - patient alert and following commands, but goes in and out of sleep  - Known watershed ischemia of the occiput from low flow state during admission 2/2 flu    # CV:  - currently off pressors  - MAPs > 65  - holding antihypertensives in setting of septic shock; normotensive BPs at this time (120s-130s systolics)    # Pulm:  - currently intubated for hypoxemic resp fail 2/2 to asp pna  - mental status improved; will have PT work with patient after dailysis to mobilize and strengthen for possible extubation within the next 48 hours  - c/w CPAP today and continue with ancef for PNA treatment  - s/p 5 days of vanc/ zosyn, 3 days of nafcillin, now on Ancef (swtiched from nafcillin as patient may have allergic reaction to naf)- dose ancef with dialysis     # Renal:  - plan for dialysis today  - Patient pending permacath placement by IR once afebrile and cleared MSSA  - Appreciate renal recommendations    # ID: concern for aspiration PNA with blood culture and sputum positive for staph aureus    - febrile overnight- follow up bcx from today; last BCX from 2/24 shows clearance of infection  - MSSA bactermia- s/p 5 days of vanc/ zosyn, 3 days of nafcillin, now on Ancef (swtiched from nafcillin as patient may have allergic reaction to naf)- dose ancef with dialysis   - IR for permacath after new set of BCx are neg  - echo shows no signs of vegetation    # GI:  - continue feeds via NG, Nepro @ 40cc/hr x 24 hrs to provide 960cc fluids recommended by nutrition  - GI ppx with protonix 40mg daily    # Endocrine:  - Continue tube feeds  - Continue with 10U Lantus and FS q6h with low SSI.    # Heme:  - Will continue to trend H/H  - Currently holding Herceptin    # Skin:  - improvement in pruritits, no changes in rash  - continue to monitor     # DVT ppx:  - HSC 5000 units Q8 hours

## 2018-02-27 NOTE — PROGRESS NOTE ADULT - PROBLEM SELECTOR PLAN 1
-Check bs q6  -Continue NPH from 10 units sq at 12pm, 6pm, and 12am as TF are held from 6am-12pm.  -mod scale q6  -will adjust as diet is changed/advanced  Patria Weiner MD  [phone]

## 2018-02-27 NOTE — PROGRESS NOTE ADULT - ATTENDING COMMENTS
1. Acute hypoxemic resp. failure due to MSSA pneumonia and bacteremia. Mild secretions from ETT. Tolerating PS wean but  still with apneas. Continue current AC vent settings. Continue with ANCEF. Pt less pruritic than on Nafcillin. Also rash r arm and legs stable.  2. PT for OOB chair.  3.ESRD for dialysis today.    cc time 35 min

## 2018-02-27 NOTE — PROGRESS NOTE ADULT - PROBLEM SELECTOR PLAN 1
from ATN in setting of cardiac arrest. Pt continues to be oligo-anuric, requiring dialysis. No evidence of renal recovery at present. Last HD 2/24. Schedule for maintenance HD today. Avoid nephrotoxins.   unclear if skin eruption related to current dialyzer. Changing dialyzer to Exeltra 150 today.

## 2018-02-28 LAB
ALBUMIN SERPL ELPH-MCNC: 2.3 G/DL — LOW (ref 3.3–5)
ALBUMIN SERPL ELPH-MCNC: 2.5 G/DL — LOW (ref 3.3–5)
ALP SERPL-CCNC: 284 U/L — HIGH (ref 40–120)
ALP SERPL-CCNC: 316 U/L — HIGH (ref 40–120)
ALT FLD-CCNC: 17 U/L RC — SIGNIFICANT CHANGE UP (ref 10–45)
ALT FLD-CCNC: <5 U/L RC — LOW (ref 10–45)
ANION GAP SERPL CALC-SCNC: 12 MMOL/L — SIGNIFICANT CHANGE UP (ref 5–17)
ANION GAP SERPL CALC-SCNC: 13 MMOL/L — SIGNIFICANT CHANGE UP (ref 5–17)
APTT BLD: 39.4 SEC — HIGH (ref 27.5–37.4)
AST SERPL-CCNC: 15 U/L — SIGNIFICANT CHANGE UP (ref 10–40)
AST SERPL-CCNC: 20 U/L — SIGNIFICANT CHANGE UP (ref 10–40)
BASOPHILS # BLD AUTO: 0 K/UL — SIGNIFICANT CHANGE UP (ref 0–0.2)
BASOPHILS NFR BLD AUTO: 0.4 % — SIGNIFICANT CHANGE UP (ref 0–2)
BILIRUB SERPL-MCNC: 0.2 MG/DL — SIGNIFICANT CHANGE UP (ref 0.2–1.2)
BILIRUB SERPL-MCNC: 0.3 MG/DL — SIGNIFICANT CHANGE UP (ref 0.2–1.2)
BUN SERPL-MCNC: 37 MG/DL — HIGH (ref 7–23)
BUN SERPL-MCNC: 46 MG/DL — HIGH (ref 7–23)
CALCIUM SERPL-MCNC: 8.7 MG/DL — SIGNIFICANT CHANGE UP (ref 8.4–10.5)
CALCIUM SERPL-MCNC: 8.7 MG/DL — SIGNIFICANT CHANGE UP (ref 8.4–10.5)
CHLORIDE SERPL-SCNC: 101 MMOL/L — SIGNIFICANT CHANGE UP (ref 96–108)
CHLORIDE SERPL-SCNC: 102 MMOL/L — SIGNIFICANT CHANGE UP (ref 96–108)
CO2 SERPL-SCNC: 24 MMOL/L — SIGNIFICANT CHANGE UP (ref 22–31)
CO2 SERPL-SCNC: 28 MMOL/L — SIGNIFICANT CHANGE UP (ref 22–31)
CREAT SERPL-MCNC: 3.47 MG/DL — HIGH (ref 0.5–1.3)
CREAT SERPL-MCNC: 4.34 MG/DL — HIGH (ref 0.5–1.3)
EOSINOPHIL # BLD AUTO: 1.5 K/UL — HIGH (ref 0–0.5)
EOSINOPHIL NFR BLD AUTO: 17.4 % — HIGH (ref 0–6)
GLUCOSE BLDC GLUCOMTR-MCNC: 182 MG/DL — HIGH (ref 70–99)
GLUCOSE BLDC GLUCOMTR-MCNC: 182 MG/DL — HIGH (ref 70–99)
GLUCOSE BLDC GLUCOMTR-MCNC: 212 MG/DL — HIGH (ref 70–99)
GLUCOSE BLDC GLUCOMTR-MCNC: 260 MG/DL — HIGH (ref 70–99)
GLUCOSE SERPL-MCNC: 208 MG/DL — HIGH (ref 70–99)
GLUCOSE SERPL-MCNC: 266 MG/DL — HIGH (ref 70–99)
HBV CORE IGM SER-ACNC: SIGNIFICANT CHANGE UP
HBV SURFACE AB SER-ACNC: <3 MIU/ML — LOW
HBV SURFACE AG SER-ACNC: SIGNIFICANT CHANGE UP
HCT VFR BLD CALC: 22.8 % — LOW (ref 34.5–45)
HCT VFR BLD CALC: 23 % — LOW (ref 34.5–45)
HCV AB S/CO SERPL IA: 0.27 S/CO — SIGNIFICANT CHANGE UP
HCV AB SERPL-IMP: SIGNIFICANT CHANGE UP
HGB BLD-MCNC: 7.2 G/DL — LOW (ref 11.5–15.5)
HGB BLD-MCNC: 7.4 G/DL — LOW (ref 11.5–15.5)
INR BLD: 1 RATIO — SIGNIFICANT CHANGE UP (ref 0.88–1.16)
INR BLD: 1 RATIO — SIGNIFICANT CHANGE UP (ref 0.88–1.16)
LYMPHOCYTES # BLD AUTO: 0.9 K/UL — LOW (ref 1–3.3)
LYMPHOCYTES # BLD AUTO: 10.6 % — LOW (ref 13–44)
MAGNESIUM SERPL-MCNC: 2.4 MG/DL — SIGNIFICANT CHANGE UP (ref 1.6–2.6)
MAGNESIUM SERPL-MCNC: 2.5 MG/DL — SIGNIFICANT CHANGE UP (ref 1.6–2.6)
MCHC RBC-ENTMCNC: 30.9 PG — SIGNIFICANT CHANGE UP (ref 27–34)
MCHC RBC-ENTMCNC: 31.5 PG — SIGNIFICANT CHANGE UP (ref 27–34)
MCHC RBC-ENTMCNC: 31.7 GM/DL — LOW (ref 32–36)
MCHC RBC-ENTMCNC: 32.3 GM/DL — SIGNIFICANT CHANGE UP (ref 32–36)
MCV RBC AUTO: 97.3 FL — SIGNIFICANT CHANGE UP (ref 80–100)
MCV RBC AUTO: 97.7 FL — SIGNIFICANT CHANGE UP (ref 80–100)
MONOCYTES # BLD AUTO: 0.9 K/UL — SIGNIFICANT CHANGE UP (ref 0–0.9)
MONOCYTES NFR BLD AUTO: 10.8 % — SIGNIFICANT CHANGE UP (ref 2–14)
NEUTROPHILS # BLD AUTO: 5.2 K/UL — SIGNIFICANT CHANGE UP (ref 1.8–7.4)
NEUTROPHILS NFR BLD AUTO: 60.8 % — SIGNIFICANT CHANGE UP (ref 43–77)
PHOSPHATE SERPL-MCNC: 3.3 MG/DL — SIGNIFICANT CHANGE UP (ref 2.5–4.5)
PHOSPHATE SERPL-MCNC: 4.8 MG/DL — HIGH (ref 2.5–4.5)
PLATELET # BLD AUTO: 278 K/UL — SIGNIFICANT CHANGE UP (ref 150–400)
PLATELET # BLD AUTO: 289 K/UL — SIGNIFICANT CHANGE UP (ref 150–400)
POTASSIUM SERPL-MCNC: 3.5 MMOL/L — SIGNIFICANT CHANGE UP (ref 3.5–5.3)
POTASSIUM SERPL-MCNC: 3.8 MMOL/L — SIGNIFICANT CHANGE UP (ref 3.5–5.3)
POTASSIUM SERPL-SCNC: 3.5 MMOL/L — SIGNIFICANT CHANGE UP (ref 3.5–5.3)
POTASSIUM SERPL-SCNC: 3.8 MMOL/L — SIGNIFICANT CHANGE UP (ref 3.5–5.3)
PROT SERPL-MCNC: 7 G/DL — SIGNIFICANT CHANGE UP (ref 6–8.3)
PROT SERPL-MCNC: 7.1 G/DL — SIGNIFICANT CHANGE UP (ref 6–8.3)
PROTHROM AB SERPL-ACNC: 10.8 SEC — SIGNIFICANT CHANGE UP (ref 9.8–12.7)
PROTHROM AB SERPL-ACNC: 10.9 SEC — SIGNIFICANT CHANGE UP (ref 9.8–12.7)
RBC # BLD: 2.34 M/UL — LOW (ref 3.8–5.2)
RBC # BLD: 2.36 M/UL — LOW (ref 3.8–5.2)
RBC # FLD: 16.1 % — HIGH (ref 10.3–14.5)
RBC # FLD: 16.2 % — HIGH (ref 10.3–14.5)
SODIUM SERPL-SCNC: 139 MMOL/L — SIGNIFICANT CHANGE UP (ref 135–145)
SODIUM SERPL-SCNC: 141 MMOL/L — SIGNIFICANT CHANGE UP (ref 135–145)
WBC # BLD: 8.5 K/UL — SIGNIFICANT CHANGE UP (ref 3.8–10.5)
WBC # BLD: 9.6 K/UL — SIGNIFICANT CHANGE UP (ref 3.8–10.5)
WBC # FLD AUTO: 8.5 K/UL — SIGNIFICANT CHANGE UP (ref 3.8–10.5)
WBC # FLD AUTO: 9.6 K/UL — SIGNIFICANT CHANGE UP (ref 3.8–10.5)

## 2018-02-28 PROCEDURE — 99291 CRITICAL CARE FIRST HOUR: CPT

## 2018-02-28 PROCEDURE — 99233 SBSQ HOSP IP/OBS HIGH 50: CPT | Mod: GC

## 2018-02-28 PROCEDURE — 99232 SBSQ HOSP IP/OBS MODERATE 35: CPT

## 2018-02-28 PROCEDURE — 99222 1ST HOSP IP/OBS MODERATE 55: CPT

## 2018-02-28 RX ADMIN — Medication 2: at 17:56

## 2018-02-28 RX ADMIN — Medication 81 MILLIGRAM(S): at 12:04

## 2018-02-28 RX ADMIN — HEPARIN SODIUM 5000 UNIT(S): 5000 INJECTION INTRAVENOUS; SUBCUTANEOUS at 21:12

## 2018-02-28 RX ADMIN — Medication 6: at 23:38

## 2018-02-28 RX ADMIN — NYSTATIN CREAM 1 APPLICATION(S): 100000 CREAM TOPICAL at 17:56

## 2018-02-28 RX ADMIN — Medication 6: at 05:50

## 2018-02-28 RX ADMIN — HEPARIN SODIUM 5000 UNIT(S): 5000 INJECTION INTRAVENOUS; SUBCUTANEOUS at 13:08

## 2018-02-28 RX ADMIN — NYSTATIN CREAM 1 APPLICATION(S): 100000 CREAM TOPICAL at 05:50

## 2018-02-28 RX ADMIN — HUMAN INSULIN 10 UNIT(S): 100 INJECTION, SUSPENSION SUBCUTANEOUS at 00:28

## 2018-02-28 RX ADMIN — Medication 1 APPLICATION(S): at 05:51

## 2018-02-28 RX ADMIN — Medication 4: at 12:27

## 2018-02-28 RX ADMIN — HUMAN INSULIN 10 UNIT(S): 100 INJECTION, SUSPENSION SUBCUTANEOUS at 23:39

## 2018-02-28 RX ADMIN — PANTOPRAZOLE SODIUM 40 MILLIGRAM(S): 20 TABLET, DELAYED RELEASE ORAL at 12:04

## 2018-02-28 RX ADMIN — Medication 2: at 00:29

## 2018-02-28 RX ADMIN — HEPARIN SODIUM 5000 UNIT(S): 5000 INJECTION INTRAVENOUS; SUBCUTANEOUS at 05:50

## 2018-02-28 NOTE — PROGRESS NOTE ADULT - SUBJECTIVE AND OBJECTIVE BOX
[facility] DIVISION OF KIDNEY DISEASES AND HYPERTENSION -- FOLLOW UP NOTE  --------------------------------------------------------------------------------  Chief Complaint: RUSS    24 hour events/subjective:  remains intubated, s/p HD yesterday with 650 cc UF.        PAST HISTORY  --------------------------------------------------------------------------------  No significant changes to PMH, PSH, FHx, SHx, unless otherwise noted    ALLERGIES & MEDICATIONS  --------------------------------------------------------------------------------  Allergies    doxycycline (Rash)  isoniazid (Rash)  NIFEdipine (Urticaria; Hives)  vitamin E (Short breath; Urticaria; Hives)    Intolerances      Standing Inpatient Medications  aspirin  chewable 81 milliGRAM(s) Oral daily  ceFAZolin   IVPB 2000 milliGRAM(s) IV Intermittent every 48 hours  dextrose 5%. 1000 milliLiter(s) IV Continuous <Continuous>  dextrose 5%. 1000 milliLiter(s) IV Continuous <Continuous>  dextrose 5%. 1000 milliLiter(s) IV Continuous <Continuous>  dextrose 50% Injectable 12.5 Gram(s) IV Push once  dextrose 50% Injectable 25 Gram(s) IV Push once  dextrose 50% Injectable 25 Gram(s) IV Push once  epoetin odalis Injectable 4000 Unit(s) IV Push <User Schedule>  heparin  Injectable 5000 Unit(s) SubCutaneous every 8 hours  insulin lispro (HumaLOG) corrective regimen sliding scale   SubCutaneous every 6 hours  insulin NPH human recombinant 10 Unit(s) SubCutaneous <User Schedule>  nystatin Powder 1 Application(s) Topical two times a day  pantoprazole  Injectable 40 milliGRAM(s) IV Push daily    PRN Inpatient Medications  chlorhexidine 0.12% Liquid 15 milliLiter(s) Swish and Spit every 4 hours PRN  dextrose Gel 1 Dose(s) Oral once PRN  glucagon  Injectable 1 milliGRAM(s) IntraMuscular once PRN  hydrocortisone 1% Ointment 1 Application(s) Topical every 8 hours PRN  midodrine 10 milliGRAM(s) Oral every other day PRN      REVIEW OF SYSTEMS  --------------------------------------------------------------------------------  unable to obtain    VITALS/PHYSICAL EXAM  --------------------------------------------------------------------------------  T(C): 36.7 (02-28-18 @ 08:00), Max: 37.9 (02-27-18 @ 20:00)  HR: 81 (02-28-18 @ 08:28) (71 - 89)  BP: 133/59 (02-28-18 @ 08:00) (83/47 - 227/92)  RR: 16 (02-28-18 @ 08:00) (14 - 25)  SpO2: 100% (02-28-18 @ 08:28) (87% - 100%)  Wt(kg): --        02-27-18 @ 07:01  -  02-28-18 @ 07:00  --------------------------------------------------------  IN: 1580 mL / OUT: 1450 mL / NET: 130 mL      Physical Exam:    GEn- intubated  Chest- vent sounds  Heart- S1/S2 rrr  Abdomen- soft, nd  Extremities- no edema    LABS/STUDIES  --------------------------------------------------------------------------------              7.4    9.6   >-----------<  278      [02-28-18 @ 00:17]              23.0     141  |  101  |  37  ----------------------------<  208      [02-28-18 @ 00:17]  3.5   |  28  |  3.47        Ca     8.7     [02-28-18 @ 00:17]      Mg     2.4     [02-28-18 @ 00:17]      Phos  3.3     [02-28-18 @ 00:17]    TPro  7.1  /  Alb  2.5  /  TBili  0.2  /  DBili  x   /  AST  20  /  ALT  17  /  AlkPhos  316  [02-28-18 @ 00:17]    PT/INR: PT 10.8 , INR 1.00       [02-28-18 @ 00:17]  PTT: 29.6       [02-26-18 @ 23:44]      Creatinine Trend:  SCr 3.47 [02-28 @ 00:17]  SCr 5.09 [02-26 @ 23:45]  SCr 4.07 [02-25 @ 22:49]  SCr 2.73 [02-24 @ 23:24]  SCr 3.91 [02-24 @ 00:00]    Urinalysis - [01-30-18 @ 12:52]      Color Yellow / Appearance SL Turbid / SG 1.025 / pH 6.0      Gluc 50 / Ketone Negative  / Bili Negative / Urobili Negative       Blood Trace / Protein 150 / Leuk Est Negative / Nitrite Negative      RBC 3-5 / WBC  / Hyaline  / Gran  / Sq Epi  / Non Sq Epi OCC / Bacteria       HbA1c 8.6      [01-31-18 @ 07:15]  Lipid: chol 183, , HDL 10,       [02-07-18 @ 13:34]    HBsAb <3.0      [02-27-18 @ 22:39]  HBsAg Nonreact      [02-27-18 @ 22:39]  HCV 0.27, Nonreact      [02-27-18 @ 22:39]

## 2018-02-28 NOTE — PROGRESS NOTE ADULT - SUBJECTIVE AND OBJECTIVE BOX
Follow-up Pulm Progress Note  Caleb Mon MD  [phone]    Events noted  Alert on CPAP.PS 5/5 and hemodynamically stable  AFebrile on cefazolin    Vital Signs Last 24 Hrs  T(C): 37.1 (26 Feb 2018 08:00), Max: 37.8 (25 Feb 2018 22:00)  T(F): 98.8 (26 Feb 2018 08:00), Max: 100.1 (25 Feb 2018 22:00)  HR: 87 (26 Feb 2018 10:00) (69 - 90)  BP: 130/58 (26 Feb 2018 10:00) (107/49 - 156/71)  BP(mean): 83 (26 Feb 2018 10:00) (69 - 102)  RR: 23 (26 Feb 2018 10:00) (16 - 84)  SpO2: 100% (26 Feb 2018 10:00) (100% - 100%)                       7.1    5.6   )-----------( 237      ( 25 Feb 2018 22:49 )             21.9       02-25    135  |  96  |  49<H>  ----------------------------<  283<H>  3.9   |  24  |  4.07<H>    Ca    8.6      25 Feb 2018 22:49  Phos  5.6     02-25  Mg     2.4     02-25    TPro  6.3  /  Alb  2.0<L>  /  TBili  0.5  /  DBili  x   /  AST  35  /  ALT  54<H>  /  AlkPhos  342<H>  02-25    CULTURES:    Physical Examination:  PULM: scattered rhonchi  CVS: Regular rate and rhythm, no murmurs, rubs, or gallops  ABD: Soft, non-tender  EXT:  No clubbing, cyanosis, or edema    RADIOLOGY REVIEWED  CXR: Extensive R sided opacities c/w pneumonia    CT chest:    TTE:

## 2018-02-28 NOTE — PROGRESS NOTE ADULT - ATTENDING COMMENTS
1. Acute hypoxemic respiratory failure from aspiration pneumonia. MSSA pneumonia and bacteremia. Continue PS wean. Will attempt extubation today or tomorrow after dialysis.  2.MSSA bacteremia  and pneumonia. Continue Ancef.  3. Renal failure due to ATN. Continues to need dialysis at this point,. Will arrange for PermCath by IR.  4. PT for sevgfere muscle deconditioning  cc time 35 min

## 2018-02-28 NOTE — PROGRESS NOTE ADULT - ATTENDING COMMENTS
More alert.  Ventilator requirements less  1.  Respiratory failure, acute--aggressive UF in preparation for possible extubation  2.  ARF--HD dependent.  Minimal UO

## 2018-02-28 NOTE — PROGRESS NOTE ADULT - SUBJECTIVE AND OBJECTIVE BOX
CHIEF COMPLAINT:    Interval Events:    REVIEW OF SYSTEMS:  Constitutional: [ ] negative [ ] fevers [ ] chills [ ] weight loss [ ] weight gain  HEENT: [ ] negative [ ] dry eyes [ ] eye irritation [ ] postnasal drip [ ] nasal congestion  CV: [ ] negative  [ ] chest pain [ ] orthopnea [ ] palpitations [ ] murmur  Resp: [ ] negative [ ] cough [ ] shortness of breath [ ] dyspnea [ ] wheezing [ ] sputum [ ] hemoptysis  GI: [ ] negative [ ] nausea [ ] vomiting [ ] diarrhea [ ] constipation [ ] abd pain [ ] dysphagia   : [ ] negative [ ] dysuria [ ] nocturia [ ] hematuria [ ] increased urinary frequency  Musculoskeletal: [ ] negative [ ] back pain [ ] myalgias [ ] arthralgias [ ] fracture  Skin: [ ] negative [ ] rash [ ] itch  Neurological: [ ] negative [ ] headache [ ] dizziness [ ] syncope [ ] weakness [ ] numbness  Psychiatric: [ ] negative [ ] anxiety [ ] depression  Endocrine: [ ] negative [ ] diabetes [ ] thyroid problem  Hematologic/Lymphatic: [ ] negative [ ] anemia [ ] bleeding problem  Allergic/Immunologic: [ ] negative [ ] itchy eyes [ ] nasal discharge [ ] hives [ ] angioedema  [ ] All other systems negative  [ ] Unable to assess ROS because ________    OBJECTIVE:  ICU Vital Signs Last 24 Hrs  T(C): 36.6 (28 Feb 2018 04:00), Max: 37.9 (27 Feb 2018 20:00)  T(F): 97.9 (28 Feb 2018 04:00), Max: 100.2 (27 Feb 2018 20:00)  HR: 77 (28 Feb 2018 07:30) (71 - 89)  BP: 168/76 (28 Feb 2018 07:30) (83/47 - 227/92)  BP(mean): 109 (28 Feb 2018 07:30) (56 - 132)  ABP: --  ABP(mean): --  RR: 14 (28 Feb 2018 07:30) (14 - 25)  SpO2: 100% (28 Feb 2018 07:30) (87% - 100%)    Mode: CPAP with PS, FiO2: 40, PEEP: 5, PS: 5, MAP: 7, PIP: 10    02-27 @ 07:01 - 02-28 @ 07:00  --------------------------------------------------------  IN: 1580 mL / OUT: 1450 mL / NET: 130 mL      CAPILLARY BLOOD GLUCOSE      POCT Blood Glucose.: 260 mg/dL (28 Feb 2018 05:22)      PHYSICAL EXAM:  General:   HEENT:   Lymph Nodes:  Neck:   Respiratory:   Cardiovascular:   Abdomen:   Extremities:   Skin:   Neurological:  Psychiatry:    LINES:    HOSPITAL MEDICATIONS:  Standing Meds:  aspirin  chewable 81 milliGRAM(s) Oral daily  ceFAZolin   IVPB 2000 milliGRAM(s) IV Intermittent every 48 hours  dextrose 5%. 1000 milliLiter(s) IV Continuous <Continuous>  dextrose 5%. 1000 milliLiter(s) IV Continuous <Continuous>  dextrose 5%. 1000 milliLiter(s) IV Continuous <Continuous>  dextrose 50% Injectable 12.5 Gram(s) IV Push once  dextrose 50% Injectable 25 Gram(s) IV Push once  dextrose 50% Injectable 25 Gram(s) IV Push once  epoetin odalis Injectable 4000 Unit(s) IV Push <User Schedule>  heparin  Injectable 5000 Unit(s) SubCutaneous every 8 hours  insulin lispro (HumaLOG) corrective regimen sliding scale   SubCutaneous every 6 hours  insulin NPH human recombinant 10 Unit(s) SubCutaneous <User Schedule>  nystatin Powder 1 Application(s) Topical two times a day  pantoprazole  Injectable 40 milliGRAM(s) IV Push daily      PRN Meds:  chlorhexidine 0.12% Liquid 15 milliLiter(s) Swish and Spit every 4 hours PRN  dextrose Gel 1 Dose(s) Oral once PRN  glucagon  Injectable 1 milliGRAM(s) IntraMuscular once PRN  hydrocortisone 1% Ointment 1 Application(s) Topical every 8 hours PRN  midodrine 10 milliGRAM(s) Oral every other day PRN      LABS:                        7.4    9.6   )-----------( 278      ( 28 Feb 2018 00:17 )             23.0     Hgb Trend: 7.4<--, 7.2<--, 7.1<--, 7.2<--, 7.4<--  02-28    141  |  101  |  37<H>  ----------------------------<  208<H>  3.5   |  28  |  3.47<H>    Ca    8.7      28 Feb 2018 00:17  Phos  3.3     02-28  Mg     2.4     02-28    TPro  7.1  /  Alb  2.5<L>  /  TBili  0.2  /  DBili  x   /  AST  20  /  ALT  17  /  AlkPhos  316<H>  02-28    Creatinine Trend: 3.47<--, 5.09<--, 4.07<--, 2.73<--, 3.91<--, 2.57<--  PT/INR - ( 28 Feb 2018 00:17 )   PT: 10.8 sec;   INR: 1.00 ratio         PTT - ( 26 Feb 2018 23:44 )  PTT:29.6 sec          MICROBIOLOGY:     RADIOLOGY:  [ ] Reviewed and interpreted by me    EKG: CHIEF COMPLAINT: asp PNA    Interval Events: pt awake and at baseline mental status. tolerating sbt well    REVIEW OF SYSTEMS:  Constitutional: [ ] negative [ ] fevers [ ] chills [ ] weight loss [ ] weight gain  HEENT: [ ] negative [ ] dry eyes [ ] eye irritation [ ] postnasal drip [ ] nasal congestion  CV: [ ] negative  [ ] chest pain [ ] orthopnea [ ] palpitations [ ] murmur  Resp: [ ] negative [ ] cough [ ] shortness of breath [ ] dyspnea [ ] wheezing [ ] sputum [ ] hemoptysis  GI: [ ] negative [ ] nausea [ ] vomiting [ ] diarrhea [ ] constipation [ ] abd pain [ ] dysphagia   : [ ] negative [ ] dysuria [ ] nocturia [ ] hematuria [ ] increased urinary frequency  Musculoskeletal: [ ] negative [ ] back pain [ ] myalgias [ ] arthralgias [ ] fracture  Skin: [ ] negative [ ] rash [ ] itch  Neurological: [ ] negative [ ] headache [ ] dizziness [ ] syncope [ ] weakness [ ] numbness  Psychiatric: [ ] negative [ ] anxiety [ ] depression  Endocrine: [ ] negative [ ] diabetes [ ] thyroid problem  Hematologic/Lymphatic: [ ] negative [ ] anemia [ ] bleeding problem  Allergic/Immunologic: [ ] negative [ ] itchy eyes [ ] nasal discharge [ ] hives [ ] angioedema  [ ] All other systems negative  [ x] Unable to assess ROS because intubated    OBJECTIVE:  ICU Vital Signs Last 24 Hrs  T(C): 36.6 (28 Feb 2018 04:00), Max: 37.9 (27 Feb 2018 20:00)  T(F): 97.9 (28 Feb 2018 04:00), Max: 100.2 (27 Feb 2018 20:00)  HR: 77 (28 Feb 2018 07:30) (71 - 89)  BP: 168/76 (28 Feb 2018 07:30) (83/47 - 227/92)  BP(mean): 109 (28 Feb 2018 07:30) (56 - 132)  ABP: --  ABP(mean): --  RR: 14 (28 Feb 2018 07:30) (14 - 25)  SpO2: 100% (28 Feb 2018 07:30) (87% - 100%)    Mode: CPAP with PS, FiO2: 40, PEEP: 5, PS: 5, MAP: 7, PIP: 10    02-27 @ 07:01 - 02-28 @ 07:00  --------------------------------------------------------  IN: 1580 mL / OUT: 1450 mL / NET: 130 mL      CAPILLARY BLOOD GLUCOSE      POCT Blood Glucose.: 260 mg/dL (28 Feb 2018 05:22)      PHYSICAL EXAM:   GEN: Age appropriate female, resting comfortably in bed, no acute distress, non toxic appearing  HEENT: Conjunctiva and sclera normal  PULM: +intubated. Lungs CTAB, no wheezes, rales, rhonchi. + mechanical breath sounds.   CV: RRR, S1S2, no MRG  Abdominal: Soft, nontender to palpation, non-distended, +BS  Extremities: No edema or cyanosis  NEURO: AAOx3. moving 3/4 extremities. did not move RLE.   Skin: No rashes, lesions      HOSPITAL MEDICATIONS:  Standing Meds:  aspirin  chewable 81 milliGRAM(s) Oral daily  ceFAZolin   IVPB 2000 milliGRAM(s) IV Intermittent every 48 hours  dextrose 5%. 1000 milliLiter(s) IV Continuous <Continuous>  dextrose 5%. 1000 milliLiter(s) IV Continuous <Continuous>  dextrose 5%. 1000 milliLiter(s) IV Continuous <Continuous>  dextrose 50% Injectable 12.5 Gram(s) IV Push once  dextrose 50% Injectable 25 Gram(s) IV Push once  dextrose 50% Injectable 25 Gram(s) IV Push once  epoetin odalis Injectable 4000 Unit(s) IV Push <User Schedule>  heparin  Injectable 5000 Unit(s) SubCutaneous every 8 hours  insulin lispro (HumaLOG) corrective regimen sliding scale   SubCutaneous every 6 hours  insulin NPH human recombinant 10 Unit(s) SubCutaneous <User Schedule>  nystatin Powder 1 Application(s) Topical two times a day  pantoprazole  Injectable 40 milliGRAM(s) IV Push daily      PRN Meds:  chlorhexidine 0.12% Liquid 15 milliLiter(s) Swish and Spit every 4 hours PRN  dextrose Gel 1 Dose(s) Oral once PRN  glucagon  Injectable 1 milliGRAM(s) IntraMuscular once PRN  hydrocortisone 1% Ointment 1 Application(s) Topical every 8 hours PRN  midodrine 10 milliGRAM(s) Oral every other day PRN      LABS:                        7.4    9.6   )-----------( 278      ( 28 Feb 2018 00:17 )             23.0     Hgb Trend: 7.4<--, 7.2<--, 7.1<--, 7.2<--, 7.4<--  02-28    141  |  101  |  37<H>  ----------------------------<  208<H>  3.5   |  28  |  3.47<H>    Ca    8.7      28 Feb 2018 00:17  Phos  3.3     02-28  Mg     2.4     02-28    TPro  7.1  /  Alb  2.5<L>  /  TBili  0.2  /  DBili  x   /  AST  20  /  ALT  17  /  AlkPhos  316<H>  02-28    Creatinine Trend: 3.47<--, 5.09<--, 4.07<--, 2.73<--, 3.91<--, 2.57<--  PT/INR - ( 28 Feb 2018 00:17 )   PT: 10.8 sec;   INR: 1.00 ratio         PTT - ( 26 Feb 2018 23:44 )  PTT:29.6 sec

## 2018-02-28 NOTE — CHART NOTE - NSCHARTNOTEFT_GEN_A_CORE
Follow up.    In MICU for hypoxic respiratory failure likely 2/2 aspiration pneumonia, remains intubated, attempt for extubation today. New ESRD, last HD 2/27, no plan for HD today per renal.    Source: Patient [ ]    Family [ ]     other [x ] chart    Diet : 2/22 Nepro 40cc/hr x 24 hrs via NGT    GI: no V/abd distention, had loose BM today     Enteral /Parenteral Nutrition: goal 960cc/day  24 hr intake 2/28 720cc (75% of needs), 2/27 560cc (58% of needs, noted feeds were held for possible extubation)      Current Weight: 2/28 165, 2/26 159, dosing wt 163 lb  has 1+ generalized edema    Pertinent Medications: MEDICATIONS  (STANDING):  aspirin  chewable 81 milliGRAM(s) Oral daily  ceFAZolin   IVPB 2000 milliGRAM(s) IV Intermittent every 48 hours  dextrose 5%. 1000 milliLiter(s) (50 mL/Hr) IV Continuous <Continuous>  dextrose 5%. 1000 milliLiter(s) (50 mL/Hr) IV Continuous <Continuous>  dextrose 5%. 1000 milliLiter(s) (50 mL/Hr) IV Continuous <Continuous>  dextrose 50% Injectable 12.5 Gram(s) IV Push once  dextrose 50% Injectable 25 Gram(s) IV Push once  dextrose 50% Injectable 25 Gram(s) IV Push once  epoetin odalis Injectable 4000 Unit(s) IV Push <User Schedule>  heparin  Injectable 5000 Unit(s) SubCutaneous every 8 hours  insulin lispro (HumaLOG) corrective regimen sliding scale   SubCutaneous every 6 hours  insulin NPH human recombinant 10 Unit(s) SubCutaneous <User Schedule>  nystatin Powder 1 Application(s) Topical two times a day  pantoprazole  Injectable 40 milliGRAM(s) IV Push daily    MEDICATIONS  (PRN):  chlorhexidine 0.12% Liquid 15 milliLiter(s) Swish and Spit every 4 hours PRN mouth hygiene  dextrose Gel 1 Dose(s) Oral once PRN Blood Glucose LESS THAN 70 milliGRAM(s)/deciliter  glucagon  Injectable 1 milliGRAM(s) IntraMuscular once PRN Glucose LESS THAN 70 milligrams/deciliter  hydrocortisone 1% Ointment 1 Application(s) Topical every 8 hours PRN Rash and/or Itching  midodrine 10 milliGRAM(s) Oral every other day PRN prior to dialysis    Pertinent Labs:  02-28 Na141 mmol/L Glu 208 mg/dL<H> K+ 3.5 mmol/L Cr  3.47 mg/dL<H> BUN 37 mg/dL<H> Phos 3.3 mg/dL Alb 2.5 g/dL<L> PAB n/a     , 182, 223, 159    Skin: no pressure injuries    Estimated Needs:   [x ] no change since previous assessment  [ ] recalculated:       Previous Nutrition Diagnosis: [x ] Increased protein Needs    Nutrition Diagnosis is [ x] ongoing  [ ] resolved [ ] not applicable   addressed w/ EN       New Nutrition Diagnosis: [x ] not applicable     Interventions:     Recommend    [ ] Change Diet To:    [ ] Nutrition Supplement    [x ] Nutrition Support: recommend change Nepro to  55 cc/hr x 18 hrs provides 1782 kcals, 80 gm protein, 720cc free water for 24 kcals/kg dosing wt 74.1 kg, 1.5gm protein/kg IBW 52.3kg    [ ] Other:        Monitoring and Evaluation:     [ ] PO intake [x ] Tolerance to diet prescription [x ] weights [x ] follow up per protocol    [ ] other:

## 2018-02-28 NOTE — PROGRESS NOTE ADULT - SUBJECTIVE AND OBJECTIVE BOX
Diabetes Follow up note:  Interval Hx:  67 year old female w/uncontrolled T2DM w/complications here w/Flu c/b PEA arrest and Acute respiratory failure. Pt seen in MICU. Intubated. Tube feeds on hold today as team plans to dialyze and possibly extubated this afternoon. BG values running mostly above goal on present insulin regimen. Pt awake w/family at bedside and following commands appropriately.     Review of Systems:  Unable to verbalize ROS. Denies pain    Meds:   insulin lispro (HumaLOG) corrective regimen sliding scale   SubCutaneous every 6 hours  insulin NPH human recombinant 10 Unit(s) SubCutaneous <User Schedule>    ceFAZolin   IVPB 2000 milliGRAM(s) IV Intermittent every 48 hours    Allergies    doxycycline (Rash)  isoniazid (Rash)  NIFEdipine (Urticaria; Hives)  vitamin E (Short breath; Urticaria; Hives)      PE:  General: Female lying in bed. Intubated  Vital Signs Last 24 Hrs  T(C): 36.8 (28 Feb 2018 12:00), Max: 37.9 (27 Feb 2018 20:00)  T(F): 98.2 (28 Feb 2018 12:00), Max: 100.2 (27 Feb 2018 20:00)  HR: 83 (28 Feb 2018 12:29) (71 - 92)  BP: 159/70 (28 Feb 2018 12:00) (83/47 - 227/92)  BP(mean): 92 (28 Feb 2018 12:00) (56 - 132)  RR: 52 (28 Feb 2018 12:00) (14 - 52)  SpO2: 100% (28 Feb 2018 12:29) (87% - 100%)  CV: S1, S2. NSR on monitor. + LE edema   Abd: Soft, NT,ND, NGT in place  Extremities: Warm  Neuro: A&O X3    LABS:    POCT Blood Glucose.: 212 mg/dL (02-28-18 @ 12:13)  POCT Blood Glucose.: 260 mg/dL (02-28-18 @ 05:22)  POCT Blood Glucose.: 182 mg/dL (02-27-18 @ 23:51)  POCT Blood Glucose.: 223 mg/dL (02-27-18 @ 17:31)  POCT Blood Glucose.: 159 mg/dL (02-27-18 @ 12:55)  POCT Blood Glucose.: 219 mg/dL (02-27-18 @ 05:53)  POCT Blood Glucose.: 186 mg/dL (02-26-18 @ 17:03)  POCT Blood Glucose.: 261 mg/dL (02-26-18 @ 12:05)  POCT Blood Glucose.: 285 mg/dL (02-26-18 @ 04:57)  POCT Blood Glucose.: 269 mg/dL (02-25-18 @ 23:08)  POCT Blood Glucose.: 333 mg/dL (02-25-18 @ 17:32)                            7.4    9.6   )-----------( 278      ( 28 Feb 2018 00:17 )             23.0       02-28    141  |  101  |  37<H>  ----------------------------<  208<H>  3.5   |  28  |  3.47<H>    Ca    8.7      28 Feb 2018 00:17  Phos  3.3     02-28  Mg     2.4     02-28    TPro  7.1  /  Alb  2.5<L>  /  TBili  0.2  /  DBili  x   /  AST  20  /  ALT  17  /  AlkPhos  316<H>  02-28        Hemoglobin A1C, Whole Blood: 8.6 % <H> [4.0 - 5.6] (01-31-18 @ 07:15)  Hemoglobin A1C, Whole Blood: 8.7 % <H> [4.0 - 5.6] (01-31-18 @ 05:51)            Contact number: marcelino [phone] or [phone]

## 2018-02-28 NOTE — PROGRESS NOTE ADULT - PROBLEM SELECTOR PLAN 1
from ATN in setting of cardiac arrest. Pt continues to be oligo-anuric, requiring dialysis. No evidence of renal recovery at present. Last HD 2/27 with 650 ml UF. Pt with skin eruption, unclear whether related to reaction from dialyzer. Dialyzed with Exeltra 150 yesterday without any issues.   No indication for HD today. Pt needs tunneled catheter once infection clears .   Avoid nephrotoxins.

## 2018-02-28 NOTE — PROGRESS NOTE ADULT - ASSESSMENT
67F PMH HTN, DMT2, Breast Cancer (diagnosed in Feb 2017) currently on trastuzumab (last dose Jan 20th), originally presented 1/30 with fever found to have influenza subsequently went into PEA arrest x 2 with ROSC, complicated by bilateral pneumothoraces with pneumomediastinum s/p 3 chest tubes, and also new ESRD requiring almost daily HD, readmitted to the MICU for hypoxic respiratory failure likely 2/2 aspiration pneumonia.     NEUROLOGIC:  Alteration of mental status present. Likely due to structural 2/2 to anoxic brain injury from prolonged PEA arrest this admission with evidence of watershed infarcts on imaging. In addition, pt has metabolic endogenous bacteremia vs exogenous sedation while on vent.   If extubated, pt should be considered for early mobilization and immediate physical therapy?    SKIN:  Lines:  Decubiti: none    GI:  Diet: NPO with feeds  GI stress prophylaxis indicated    METABOLIC:  BMP showing evidence of   Liver functions tests show   Endocrine abnormalities include  02-28    141  |  101  |  37<H>  ----------------------------<  208<H>  3.5   |  28  |  3.47<H>    Ca    8.7      28 Feb 2018 00:17  Phos  3.3     02-28  Mg     2.4     02-28    TPro  7.1  /  Alb  2.5<L>  /  TBili  0.2  /  DBili  x   /  AST  20  /  ALT  17  /  AlkPhos  316<H>  02-28      VOLUME ASSESSMENT:  No peripheral edema  No evidence disturbance of effective circulating volume    HEMATOLOGIC:  CBC results show  Coag panel shows  DVT prophylaxis with                         7.4    9.6   )-----------( 278      ( 28 Feb 2018 00:17 )             23.0     PT/INR - ( 28 Feb 2018 00:17 )   PT: 10.8 sec;   INR: 1.00 ratio         PTT - ( 26 Feb 2018 23:44 )  PTT:29.6 sec    INFECTIOUS DISEASE:   -concern for aspiration PNA with blood culture and sputum positive for staph aureus    - afebrile overnight- follow up bcx from 2/27. BCX from 2/24 shows clearance of infection  - MSSA bactermia- cefazolin 2g every 48 hours with dialysis   - IR for permacath after new set of BCx are neg    HEMODYNAMICS:  Patient is on pressors due to vasopegic effect of endogenous bacteremia vs endogenous sedation. On midodrine.      CARDIOVASCULAR:  GDE from 2/26 shows: normal systolic function, no pericardial effusions, no wall motion abnormalities; valves appear grossly normal; approx 2 cm IVC      RESPIRATORY:  Pt intubated 2/2 to asp PNA  Chest US from 2/26 : scattered B lines, R focal alveolar consolidation, no b/l pleff  No evidence of barotrauma, volutrauma, hemodynamic consequence, or FIO2 injury?  PS trial today. Will attempt to extubate. 67F PMH HTN, DMT2, Breast Cancer (diagnosed in Feb 2017) currently on trastuzumab (last dose Jan 20th), originally presented 1/30 with fever found to have influenza subsequently went into PEA arrest x 2 with ROSC, complicated by bilateral pneumothoraces with pneumomediastinum s/p 3 chest tubes, and also new ESRD requiring almost daily HD, readmitted to the MICU for hypoxic respiratory failure likely 2/2 aspiration pneumonia.     NEUROLOGIC:  Alteration of mental status present. Likely due to structural 2/2 to anoxic brain injury from prolonged PEA arrest this admission with evidence of watershed infarcts on imaging. In addition, pt has metabolic endogenous bacteremia vs exogenous sedation while on vent.   If extubated, pt should be considered for early mobilization and immediate physical therapy    SKIN:  Lines: right IJ  Decubiti: none    GI:  Diet: NPO with feeds  GI stress prophylaxis indicated    METABOLIC:  BMP showing evidence of   Liver functions tests show   Endocrine abnormalities include  02-28    141  |  101  |  37<H>  ----------------------------<  208<H>  3.5   |  28  |  3.47<H>    Ca    8.7      28 Feb 2018 00:17  Phos  3.3     02-28  Mg     2.4     02-28    TPro  7.1  /  Alb  2.5<L>  /  TBili  0.2  /  DBili  x   /  AST  20  /  ALT  17  /  AlkPhos  316<H>  02-28      VOLUME ASSESSMENT:  No peripheral edema  No evidence disturbance of effective circulating volume    HEMATOLOGIC:  CBC results show  Coag panel shows  DVT prophylaxis with                         7.4    9.6   )-----------( 278      ( 28 Feb 2018 00:17 )             23.0     PT/INR - ( 28 Feb 2018 00:17 )   PT: 10.8 sec;   INR: 1.00 ratio         PTT - ( 26 Feb 2018 23:44 )  PTT:29.6 sec    INFECTIOUS DISEASE:   -concern for aspiration PNA with blood culture and sputum positive for staph aureus    - afebrile overnight- follow up bcx from 2/27. BCX from 2/24 shows clearance of infection  - MSSA bactermia- cefazolin 2g every 48 hours with dialysis   - IR for permacath after new set of BCx are neg    HEMODYNAMICS:  Patient is on pressors due to vasopegic effect of endogenous bacteremia vs endogenous sedation. On midodrine.      CARDIOVASCULAR:  GDE from 2/26 shows: normal systolic function, no pericardial effusions, no wall motion abnormalities; valves appear grossly normal; approx 2 cm IVC      RESPIRATORY:  Pt intubated 2/2 to asp PNA  Chest US from 2/26 : scattered B lines, R focal alveolar consolidation, no b/l pleff  No evidence of barotrauma, volutrauma, hemodynamic consequence, or FIO2 injury?  PS trial today. Will attempt to extubate.

## 2018-02-28 NOTE — PROGRESS NOTE ADULT - PROBLEM SELECTOR PLAN 1
-test BG Q6h  -Would increase NPH 12 units q6h (hold when off tube feeds)  -c/w Humalog moderate correction scale Q6h (give regardless of PO status)  -will adjust as needed w/diet adjustment  -discussed w/family and RN  pager: [phone]

## 2018-02-28 NOTE — PROGRESS NOTE ADULT - ASSESSMENT
ACute hypoxemic  resp failure: clinically improved, and undergoing PS weaning trials  S Aureus bacteremia: septic shock, hypotension now resolved  ESRD  R sided aspiration pneumonia: partial radiographic clearing  S/P CAC with rib fractures and bilateral pneumothoraces  CVA with corical and subcortical infarcts  s/p cardiac arrest    REC    Continue antibiotics   Extubation trial per MICU team today  NF feeds: aspiration precautions  COmplete antibiotics  HD per renal  Aspiration prcautions: may still need PEG - re-assess

## 2018-02-28 NOTE — PROGRESS NOTE ADULT - ASSESSMENT
68 y/o F with T2DM uncontrolled on insulin, osteoporosis, HTN, here with acute respiratory failure 2/2 influenza s/p PEA arrest x 2, intubated on/off tube feeds. BG values running elevated while on/off feeds. Plan for possible extubation today so tube feeds on hold at this time. BG goal (140-180mg/dl). Unclear if team will restart feeds later in day.

## 2018-03-01 LAB
CULTURE RESULTS: SIGNIFICANT CHANGE UP
CULTURE RESULTS: SIGNIFICANT CHANGE UP
GLUCOSE BLDC GLUCOMTR-MCNC: 136 MG/DL — HIGH (ref 70–99)
GLUCOSE BLDC GLUCOMTR-MCNC: 166 MG/DL — HIGH (ref 70–99)
GLUCOSE BLDC GLUCOMTR-MCNC: 194 MG/DL — HIGH (ref 70–99)
HBV SURFACE AB SER-ACNC: <3 MIU/ML — LOW
HBV SURFACE AG SER-ACNC: SIGNIFICANT CHANGE UP
HCV AB S/CO SERPL IA: 0.24 S/CO — SIGNIFICANT CHANGE UP
HCV AB SERPL-IMP: SIGNIFICANT CHANGE UP
SPECIMEN SOURCE: SIGNIFICANT CHANGE UP
SPECIMEN SOURCE: SIGNIFICANT CHANGE UP

## 2018-03-01 PROCEDURE — 99233 SBSQ HOSP IP/OBS HIGH 50: CPT | Mod: GC

## 2018-03-01 PROCEDURE — 99291 CRITICAL CARE FIRST HOUR: CPT

## 2018-03-01 PROCEDURE — 93010 ELECTROCARDIOGRAM REPORT: CPT

## 2018-03-01 PROCEDURE — 99232 SBSQ HOSP IP/OBS MODERATE 35: CPT

## 2018-03-01 RX ADMIN — HEPARIN SODIUM 5000 UNIT(S): 5000 INJECTION INTRAVENOUS; SUBCUTANEOUS at 05:17

## 2018-03-01 RX ADMIN — Medication 100 MILLIGRAM(S): at 17:30

## 2018-03-01 RX ADMIN — HEPARIN SODIUM 5000 UNIT(S): 5000 INJECTION INTRAVENOUS; SUBCUTANEOUS at 14:00

## 2018-03-01 RX ADMIN — HUMAN INSULIN 10 UNIT(S): 100 INJECTION, SUSPENSION SUBCUTANEOUS at 12:00

## 2018-03-01 RX ADMIN — HEPARIN SODIUM 5000 UNIT(S): 5000 INJECTION INTRAVENOUS; SUBCUTANEOUS at 21:13

## 2018-03-01 RX ADMIN — ERYTHROPOIETIN 4000 UNIT(S): 10000 INJECTION, SOLUTION INTRAVENOUS; SUBCUTANEOUS at 12:03

## 2018-03-01 RX ADMIN — HUMAN INSULIN 10 UNIT(S): 100 INJECTION, SUSPENSION SUBCUTANEOUS at 18:15

## 2018-03-01 RX ADMIN — Medication 2: at 12:00

## 2018-03-01 RX ADMIN — NYSTATIN CREAM 1 APPLICATION(S): 100000 CREAM TOPICAL at 05:17

## 2018-03-01 RX ADMIN — Medication 2: at 18:15

## 2018-03-01 RX ADMIN — NYSTATIN CREAM 1 APPLICATION(S): 100000 CREAM TOPICAL at 18:00

## 2018-03-01 RX ADMIN — Medication 81 MILLIGRAM(S): at 12:00

## 2018-03-01 NOTE — PROGRESS NOTE ADULT - PROBLEM SELECTOR PLAN 1
-test BG Q6h  -c/w NPH 10 units (12pm, 6pm, 12am-Please hold if tube feeds off!!!)  -c/w Humalog moderate correction scale Q6h  -discussed w/RN  pager: [phone]

## 2018-03-01 NOTE — PROGRESS NOTE ADULT - ATTENDING COMMENTS
Feeling improved.  Alert, extubated  1.  ARF--still oliguric.  Potential return of function if infection, pulmonary compromise improves.  Suspect prolonged and would opt for permcath when transferred to floor  2.  Respiratory failure--improved.  Still with large secretions.  Volume optimize to avoid superimposing diminished lung compliance

## 2018-03-01 NOTE — PROGRESS NOTE ADULT - ATTENDING COMMENTS
1. Acute hypoxemic respiratory failure from MSSA pneumonia and bacteremia.  Tolerating PS wean .  S/P dialysis yesterday evening. Pt with fair cough. Trial of extubation.  2. MSSA pneumonia and bacteremia. Continue Ancef.  3. Renal failure from ATN. Now requiring dialysis. Arrange for PermCath.    cc time 35 min

## 2018-03-01 NOTE — CONSULT NOTE ADULT - SUBJECTIVE AND OBJECTIVE BOX
HPI:  66y/o F hx Breast Cancer (diagnosed in Feb 2017) currently on Herceptin (last dose Jan 20th), HTN, Diabetes on 70/30, presenting with Fever at home, tmax of 102 yesterday, with whole body weakness. Patient also had decreased appetite yesterday. Patient's daughter states that she herself felt unwell and was going to bring both herself and her mother to the doctor's office today. However, given pt had worsening weakness, daughter brought the patient to the ER. Patient is otherwise noted to be independent and lives at home with her . She is AOx4 at baseline.     In the ER, patient was found to have T max of 100.6, with initial vitals of 99.5, HR 82, /78, RR 18, satting 93% on room air. She was given ceftriaxone x1, and given 2L normal saline bolus with Duoneb x 3. Patient was also found to be RVP positive for flu. Patient then went into PEA arrest with chest compressions for 10min, received Rosc after Epi x 2, and was intubated. Patient required re-intubation (improperly intubated initially into the esophagus) after she PEA arrested again, with ROSC achieved after 30min with Epi x 5. Patient was also found to have bilateral pneumothoraces, s/p 3 chest tubes, 2 on the right side, and 1 on the left. Patient also has paracentesis drain for decompression of the abdomen.     Most recent vitals are BP 96/61, , RR 34, O2 saturation 95% on Fentanyl infusion, and levophed 0.05. (30 Jan 2018 17:58)      PAST MEDICAL & SURGICAL HISTORY:  Diabetes  Breast CA  H/O mastectomy, bilateral      Allergies  doxycycline (Rash)  isoniazid (Rash)  NIFEdipine (Urticaria; Hives)  vitamin E (Short breath; Urticaria; Hives)        ANTIMICROBIALS:  ceFAZolin   IVPB 2000 every 48 hours      OTHER MEDS: MEDICATIONS  (STANDING):  aspirin  chewable 81 daily  dextrose 50% Injectable 12.5 once  dextrose 50% Injectable 25 once  dextrose 50% Injectable 25 once  dextrose Gel 1 once PRN  epoetin odalis Injectable 4000 <User Schedule>  glucagon  Injectable 1 once PRN  heparin  Injectable 5000 every 8 hours  insulin lispro (HumaLOG) corrective regimen sliding scale  every 6 hours  insulin NPH human recombinant 10 <User Schedule>  midodrine 10 every other day PRN      SOCIAL HISTORY:  [ ] etoh [ ] tobacco [ ] former smoker [ ] IVDU    FAMILY HISTORY:  No pertinent family history in first degree relatives      REVIEW OF SYSTEMS  [  ] ROS unobtainable because:    [  ] All other systems negative except as noted below:	    Constitutional:  [ ] fever [ ] weight loss  Skin:  [ ] rash [ ] phlebitis	  Eyes: [ ] icterus [ ] inflammation	  ENMT: [ ] discharge [ ] thrush [ ] ulcers [ ] exudates  Respiratory: [ ] dyspnea [ ] hemoptysis [ ] cough [ ] sputum	  Cardiovascular:  [ ] chest pain [ ] palpitations [ ] edema	  Gastrointestinal:  [ ] nausea [ ] vomiting [ ] diarrhea [ ] constipation [ ] pain	  Genitourinary:  [ ] dysuria [ ] frequency [ ] hematuria [ ] discharge [ ] flank pain  Musculoskeletal:  [ ] myalgias [ ] arthralgias [ ] arthritis	  Neurological:  [ ] headache [ ] seizures	  Psychiatric:  [ ] anxiety [ ] depression	  Hematology/Lymphatics:  [ ] lymphadenopathy  Endocrine:  [ ] adrenal [ ] thyroid  Allergic/Immunologic:	 [ ] transplant [ ] seasonal    Vital Signs Last 24 Hrs  T(F): 97.3 (03-01-18 @ 14:46), Max: 100.9 (02-22-18 @ 20:00)    Vital Signs Last 24 Hrs  HR: 102 (03-01-18 @ 17:00) (69 - 102)  BP: 141/64 (03-01-18 @ 17:00) (91/69 - 165/68)  RR: 26 (03-01-18 @ 17:00)  SpO2: 98% (03-01-18 @ 17:00) (56% - 100%)  Wt(kg): --    PHYSICAL EXAM:  General: non-toxic  HEAD/EYES: anicteric, PERRL  ENT:  supple  Cardiovascular:   S1, S2  Respiratory:  clear bilaterally  GI:  soft, non-tender, normal bowel sounds  :  no CVA tenderness   Musculoskeletal:  no synovitis  Neurologic:  grossly non-focal  Skin:  no rash  Lymph: no lymphadenopathy  Psychiatric:  appropriate affect  Vascular:  no phlebitis                                7.2    8.5   )-----------( 289      ( 28 Feb 2018 22:56 )             22.8       02-28    139  |  102  |  46<H>  ----------------------------<  266<H>  3.8   |  24  |  4.34<H>    Ca    8.7      28 Feb 2018 22:56  Phos  4.8     02-28  Mg     2.5     02-28    TPro  7.0  /  Alb  2.3<L>  /  TBili  0.3  /  DBili  x   /  AST  15  /  ALT  <5<L>  /  AlkPhos  284<H>  02-28          MICROBIOLOGY:  .Blood Blood-Venous  02-27-18   No growth to date.  --  --      .Blood Blood-Peripheral  02-24-18   No growth at 5 days.  --  --      .Sputum Sputum trap  02-22-18   Numerous Staphylococcus aureus ***********Note************  This isolate demonstrates inducible  clindamycin resistance.  Clindamycin may still be effective in some patients.  No Normal Respiratory Cate present  --  Staphylococcus aureus      .Blood Blood  02-22-18   Growth in anaerobic bottle: Staphylococcus aureus  ***********Note************  This isolate demonstrates inducible  clindamycin resistance.  Clindamycin may still be effective in some patients.  "Due to technical problems, Proteus sp. will Not be reported as part of  the BCID panel until further notice"  ***Blood Panel PCR results on this specimen are available  approximately 3 hours after the Gram stain result.***  Gram stain, PCR, and/or culture results may not always  correspond due to difference in methodologies.  ************************************************************  This PCR assay was performed using [employer].  The following targets are tested for: Enterococcus,  vancomycin resistant enterococci, Listeria monocytogenes,  coagulase negative staphylococci, S. aureus,  methicillin resistant S. aureus, Streptococcus agalactiae  (Group B), S. pneumoniae, S. pyogenes (Group A),  Acinetobacter baumannii, Enterobacter cloacae, E. coli,  Klebsiella oxytoca, K. pneumoniae, Proteus sp.,  Serratia marcescens, Haemophilus influenzae,  Neisseria meningitidis, Pseudomonas aeruginosa, Candida  albicans, C. glabrata, C krusei, C parapsilosis,  C. tropicalis and the KPC resistance gene.  --  Blood Culture PCR  Staphylococcus aureus      .Blood Blood  02-22-18   No growth at 5 days.  --  --      .Blood Blood-Peripheral  02-19-18   No growth at 5 days.  --  --      .Blood Blood-Peripheral  02-03-18   No growth at 5 days.  --  --      .Sputum Sputum  02-03-18   Normal Respiratory Cate present  --    No polymorphonuclear leukocytes per low power field  No Squamous epithelial cells per low power field  Rare Gram Variable Rods per oil power field      .Blood Blood-Venous  02-03-18   No growth at 5 days.  --  --      .Sputum Sputum  02-02-18   Normal Respiratory Cate present  --    Rare polymorphonuclear leukocytes per low power field  No squamous epithelial cells per low power field  No organisms seen      .Blood Blood-Peripheral  02-01-18   No growth at 5 days.  --  --      .Smear Other, lung biopsy  02-01-18 --  --    polymorphonuclear leukocytes seen per low power field  No organisms seen seen per oil power field  by cytocentrifuge              v            RADIOLOGY: HPI:  66y/o F hx Breast Cancer (diagnosed in Feb 2017) currently on Herceptin (last dose Jan 20th), HTN, Diabetes on 70/30, presenting with Fever at home, Tmax of 102 yesterday, with whole body weakness. Patient also had decreased appetite yesterday. Patient's daughter states that she herself felt unwell and was going to bring both herself and her mother to the doctor's office today. However, given pt had worsening weakness, daughter brought the patient to the ER. Patient is otherwise noted to be independent and lives at home with her . She is AOx4 at baseline.     In the ER, patient was found to have T max of 100.6, with initial vitals of 99.5, HR 82, /78, RR 18, satting 93% on room air. She was given ceftriaxone x1, and given 2L normal saline bolus with Duoneb x 3. Patient was also found to be RVP positive for flu. Patient then went into PEA arrest with chest compressions for 10min, received Rosc after Epi x 2, and was intubated. Patient required re-intubation (improperly intubated initially into the esophagus) after she PEA arrested again, with ROSC achieved after 30min with Epi x 5. Patient was also found to have bilateral pneumothoraces, s/p 3 chest tubes, 2 on the right side, and 1 on the left. Patient also has paracentesis drain for decompression of the abdomen.   Id note-above reviewed. ID consulted for Staph aureus bacteremia for which she is currently on cefazolin after developing ? rash to nafcillin.  Initially presented with Fever 01/30, found to have influenza Ah3 + while in ED underwent PEA arrest with ROSC intubated received tpa for presumed PE, developed b/l pneumothoraces requiring chest tubes and transferred to MICU. Initial blood cultures negative. Required new HD during this hospital stay via Rt IJ ilene. Patient extubated and downgraded to floor where she had RRT and re-intubated with concern for aspiration pneumonia 02/22. Blood cultures from that time + Staph aureus 1 bottle, cleared on 02/24, sputum cx + for Staph aureus. RT IJ replaced 02/24. TTE no vegetations 02/26. She has a mediport placed 05/2017 for chemo.    Most recent vitals are BP 96/61, , RR 34, O2 saturation 95% on Fentanyl infusion, and levophed 0.05. (30 Jan 2018 17:58)      PAST MEDICAL & SURGICAL HISTORY:  Diabetes  Breast CA  H/O mastectomy, bilateral      Allergies  doxycycline (Rash)  isoniazid (Rash)  NIFEdipine (Urticaria; Hives)  vitamin E (Short breath; Urticaria; Hives)        ANTIMICROBIALS:  ceFAZolin   IVPB 2000 every 48 hours      OTHER MEDS: MEDICATIONS  (STANDING):  aspirin  chewable 81 daily  dextrose 50% Injectable 12.5 once  dextrose 50% Injectable 25 once  dextrose 50% Injectable 25 once  dextrose Gel 1 once PRN  epoetin odalis Injectable 4000 <User Schedule>  glucagon  Injectable 1 once PRN  heparin  Injectable 5000 every 8 hours  insulin lispro (HumaLOG) corrective regimen sliding scale  every 6 hours  insulin NPH human recombinant 10 <User Schedule>  midodrine 10 every other day PRN      SOCIAL HISTORY: non smoker  [ ] etoh [ ] tobacco [ ] former smoker [ ] IVDU    FAMILY HISTORY:  No pertinent family history in first degree relatives      REVIEW OF SYSTEMS  [  ] ROS unobtainable because:    [ x ] All other systems negative except as noted below:	    Constitutional:  [ ] fever [ ] weight loss  Skin:  [ ] rash [ ] phlebitis	  Eyes: [ ] icterus [ ] inflammation	  ENMT: [ ] discharge [ ] thrush [ ] ulcers [ ] exudates  Respiratory: [ ] dyspnea [ ] hemoptysis [ ] cough [ ] sputum	  Cardiovascular:  [ ] chest pain [ ] palpitations [ ] edema	  Gastrointestinal:  [ ] nausea [ ] vomiting [ ] diarrhea [ ] constipation [ ] pain	  Genitourinary:  [ ] dysuria [ ] frequency [ ] hematuria [ ] discharge [ ] flank pain  Musculoskeletal:  [ ] myalgias [ ] arthralgias [ ] arthritis	  Neurological:  [ ] headache [ ] seizures	  Psychiatric:  [ ] anxiety [ ] depression	  Hematology/Lymphatics:  [ ] lymphadenopathy  Endocrine:  [ ] adrenal [ ] thyroid  Allergic/Immunologic:	 [ ] transplant [ ] seasonal    Vital Signs Last 24 Hrs  T(F): 97.3 (03-01-18 @ 14:46), Max: 100.9 (02-22-18 @ 20:00)    Vital Signs Last 24 Hrs  HR: 102 (03-01-18 @ 17:00) (69 - 102)  BP: 141/64 (03-01-18 @ 17:00) (91/69 - 165/68)  RR: 26 (03-01-18 @ 17:00)  SpO2: 98% (03-01-18 @ 17:00) (56% - 100%)  Wt(kg): --    PHYSICAL EXAM:  General: non-toxic, oxygen via face tent, ngt in place, awake follows commands  HEAD/EYES: anicteric, PERRL  ENT:  supple RT IJ  Cardiovascular:   S1, S2 left chest mediport  Respiratory:  clear bilaterally  GI:  soft, non-tender, normal bowel sounds  :  no CVA tenderness   Musculoskeletal:  no synovitis  Neurologic:  grossly non-focal  Skin:  no rash  Lymph: no lymphadenopathy  Psychiatric:  appropriate affect  Vascular:  no phlebitis                                7.2    8.5   )-----------( 289      ( 28 Feb 2018 22:56 )             22.8       02-28    139  |  102  |  46<H>  ----------------------------<  266<H>  3.8   |  24  |  4.34<H>    Ca    8.7      28 Feb 2018 22:56  Phos  4.8     02-28  Mg     2.5     02-28    TPro  7.0  /  Alb  2.3<L>  /  TBili  0.3  /  DBili  x   /  AST  15  /  ALT  <5<L>  /  AlkPhos  284<H>  02-28          MICROBIOLOGY:  .Blood Blood-Venous  02-27-18   No growth to date.  --  --      .Blood Blood-Peripheral  02-24-18   No growth at 5 days.  --  --      .Sputum Sputum trap  02-22-18   Numerous Staphylococcus aureus ***********Note************  This isolate demonstrates inducible  clindamycin resistance.  Clindamycin may still be effective in some patients.  No Normal Respiratory Cate present  --  Staphylococcus aureus      .Blood Blood  02-22-18   Growth in anaerobic bottle: Staphylococcus aureus  ***********Note************  This isolate demonstrates inducible  clindamycin resistance.  Clindamycin may still be effective in some patients.  "Due to technical problems, Proteus sp. will Not be reported as part of  the BCID panel until further notice"  ***Blood Panel PCR results on this specimen are available  approximately 3 hours after the Gram stain result.***  Gram stain, PCR, and/or culture results may not always  correspond due to difference in methodologies.  ************************************************************  This PCR assay was performed using [employer].  The following targets are tested for: Enterococcus,  vancomycin resistant enterococci, Listeria monocytogenes,  coagulase negative staphylococci, S. aureus,  methicillin resistant S. aureus, Streptococcus agalactiae  (Group B), S. pneumoniae, S. pyogenes (Group A),  Acinetobacter baumannii, Enterobacter cloacae, E. coli,  Klebsiella oxytoca, K. pneumoniae, Proteus sp.,  Serratia marcescens, Haemophilus influenzae,  Neisseria meningitidis, Pseudomonas aeruginosa, Candida  albicans, C. glabrata, C krusei, C parapsilosis,  C. tropicalis and the KPC resistance gene.  --  Blood Culture PCR  Staphylococcus aureus      .Blood Blood  02-22-18   No growth at 5 days.  --  --      .Blood Blood-Peripheral  02-19-18   No growth at 5 days.  --  --      .Blood Blood-Peripheral  02-03-18   No growth at 5 days.  --  --      .Sputum Sputum  02-03-18   Normal Respiratory Cate present  --    No polymorphonuclear leukocytes per low power field  No Squamous epithelial cells per low power field  Rare Gram Variable Rods per oil power field      .Blood Blood-Venous  02-03-18   No growth at 5 days.  --  --      .Sputum Sputum  02-02-18   Normal Respiratory Cate present  --    Rare polymorphonuclear leukocytes per low power field  No squamous epithelial cells per low power field  No organisms seen      .Blood Blood-Peripheral  02-01-18   No growth at 5 days.  --  --      .Smear Other, lung biopsy  02-01-18 --  --    polymorphonuclear leukocytes seen per low power field  No organisms seen seen per oil power field  by cytocentrifuge              v            RADIOLOGY:

## 2018-03-01 NOTE — PROGRESS NOTE ADULT - SUBJECTIVE AND OBJECTIVE BOX
Follow-up Pulm Progress Note  Caleb Mon MD  [phone]    Events noted  Extubated with stable respiratory status  AFebrile and hemodynamically stable: tolerating HD at bedside  Awake and responsive    Vital Signs Last 24 Hrs  T(C): 37.1 (26 Feb 2018 08:00), Max: 37.8 (25 Feb 2018 22:00)  T(F): 98.8 (26 Feb 2018 08:00), Max: 100.1 (25 Feb 2018 22:00)  HR: 87 (26 Feb 2018 10:00) (69 - 90)  BP: 130/58 (26 Feb 2018 10:00) (107/49 - 156/71)  BP(mean): 83 (26 Feb 2018 10:00) (69 - 102)  RR: 23 (26 Feb 2018 10:00) (16 - 84)  SpO2: 100% (26 Feb 2018 10:00) (100% - 100%)                       7.1    5.6   )-----------( 237      ( 25 Feb 2018 22:49 )             21.9       02-25    135  |  96  |  49<H>  ----------------------------<  283<H>  3.9   |  24  |  4.07<H>    Ca    8.6      25 Feb 2018 22:49  Phos  5.6     02-25  Mg     2.4     02-25    TPro  6.3  /  Alb  2.0<L>  /  TBili  0.5  /  DBili  x   /  AST  35  /  ALT  54<H>  /  AlkPhos  342<H>  02-25    CULTURES:    Physical Examination:  PULM: scattered rhonchi  CVS: Regular rate and rhythm, no murmurs, rubs, or gallops  ABD: Soft, non-tender  EXT:  No clubbing, cyanosis, or edema    RADIOLOGY REVIEWED  CXR: Extensive R sided opacities c/w pneumonia    CT chest:    TTE:

## 2018-03-01 NOTE — PROGRESS NOTE ADULT - PROBLEM SELECTOR PLAN 1
from ATN in setting of cardiac arrest. Pt continues to be oligo-anuric, requiring dialysis. No evidence of renal recovery at present. Had PUF treatment yesterday with 1.5 L UF. Pt with skin eruption, unclear whether related to reaction from dialyzer. Dialyzed with Exeltra 150 yesterday without any issues. Discussed with MICU team, plan is to extubate today. Will arrange for HD today with UF as tolerated. Monitor BMP daily. Patient will need tunneled HD catheter placement once infection is resolved.

## 2018-03-01 NOTE — PROGRESS NOTE ADULT - SUBJECTIVE AND OBJECTIVE BOX
[facility] Division of Kidney Diseases & Hypertension  FOLLOW UP NOTE  [phone]--------------------------------------------------------------------------------  Chief Complaint:Cardiac arrest      24 hour events/subjective:    Patient failed CPAP trial yesterday;   Had PUF treatment with 1.4 L fluid removal yesterday.  Currently on CPAP and tolerating it well.     PAST HISTORY  --------------------------------------------------------------------------------  No significant changes to PMH, PSH, FHx, SHx, unless otherwise noted    ALLERGIES & MEDICATIONS  --------------------------------------------------------------------------------  Allergies    doxycycline (Rash)  isoniazid (Rash)  NIFEdipine (Urticaria; Hives)  vitamin E (Short breath; Urticaria; Hives)    Intolerances      Standing Inpatient Medications  aspirin  chewable 81 milliGRAM(s) Oral daily  ceFAZolin   IVPB 2000 milliGRAM(s) IV Intermittent every 48 hours  dextrose 5%. 1000 milliLiter(s) IV Continuous <Continuous>  dextrose 5%. 1000 milliLiter(s) IV Continuous <Continuous>  dextrose 5%. 1000 milliLiter(s) IV Continuous <Continuous>  dextrose 50% Injectable 12.5 Gram(s) IV Push once  dextrose 50% Injectable 25 Gram(s) IV Push once  dextrose 50% Injectable 25 Gram(s) IV Push once  epoetin odalis Injectable 4000 Unit(s) IV Push <User Schedule>  heparin  Injectable 5000 Unit(s) SubCutaneous every 8 hours  insulin lispro (HumaLOG) corrective regimen sliding scale   SubCutaneous every 6 hours  insulin NPH human recombinant 10 Unit(s) SubCutaneous <User Schedule>  nystatin Powder 1 Application(s) Topical two times a day    PRN Inpatient Medications  chlorhexidine 0.12% Liquid 15 milliLiter(s) Swish and Spit every 4 hours PRN  dextrose Gel 1 Dose(s) Oral once PRN  glucagon  Injectable 1 milliGRAM(s) IntraMuscular once PRN  hydrocortisone 1% Ointment 1 Application(s) Topical every 8 hours PRN  midodrine 10 milliGRAM(s) Oral every other day PRN      REVIEW OF SYSTEMS  --------------------------------------------------------------------------------  Unable to obtain.     VITALS/PHYSICAL EXAM  --------------------------------------------------------------------------------  T(C): 36.9 (03-01-18 @ 07:30), Max: 37.4 (03-01-18 @ 04:00)  HR: 84 (03-01-18 @ 09:00) (69 - 93)  BP: 148/59 (03-01-18 @ 09:00) (100/56 - 179/71)  RR: 20 (03-01-18 @ 09:00) (14 - 52)  SpO2: 100% (03-01-18 @ 09:00) (91% - 100%)  Wt(kg): --        02-28-18 @ 07:01  -  03-01-18 @ 07:00  --------------------------------------------------------  IN: 980 mL / OUT: 2100 mL / NET: -1120 mL    03-01-18 @ 07:01  -  03-01-18 @ 09:11  --------------------------------------------------------  IN: 0 mL / OUT: 0 mL / NET: 0 mL      Physical Exam:  	  GEn- intubated  Chest- vent sounds  Heart- S1/S2 rrr  Abdomen- soft, nd  Extremities- no edema  Vascular access: Right IJ non tunneled HD catheter present.     LABS/STUDIES  --------------------------------------------------------------------------------              7.2    8.5   >-----------<  289      [02-28-18 @ 22:56]              22.8     139  |  102  |  46  ----------------------------<  266      [02-28-18 @ 22:56]  3.8   |  24  |  4.34        Ca     8.7     [02-28-18 @ 22:56]      Mg     2.5     [02-28-18 @ 22:56]      Phos  4.8     [02-28-18 @ 22:56]    TPro  7.0  /  Alb  2.3  /  TBili  0.3  /  DBili  x   /  AST  15  /  ALT  <5  /  AlkPhos  284  [02-28-18 @ 22:56]    PT/INR: PT 10.9 , INR 1.00       [02-28-18 @ 22:56]  PTT: 39.4       [02-28-18 @ 22:56]      Creatinine Trend:  SCr 4.34 [02-28 @ 22:56]  SCr 3.47 [02-28 @ 00:17]  SCr 5.09 [02-26 @ 23:45]  SCr 4.07 [02-25 @ 22:49]  SCr 2.73 [02-24 @ 23:24]          HBsAb <3.0      [02-28-18 @ 20:23]  HBsAg Nonreact      [02-28-18 @ 20:23]  HCV 0.24, Nonreact      [02-28-18 @ 20:23]

## 2018-03-01 NOTE — PROGRESS NOTE ADULT - ASSESSMENT
66 y/o F with T2DM uncontrolled on insulin, osteoporosis, HTN, here with acute respiratory failure 2/2 influenza s/p PEA arrest x 2, now extubated. Pt off tube feeds at this time; getting dialyzed. Will be at risk to drop BG and become hypoglycemic as she received her noon NPH dose. BG goal (140-180mg/dl).

## 2018-03-01 NOTE — PROGRESS NOTE ADULT - SUBJECTIVE AND OBJECTIVE BOX
CHIEF COMPLAINT:Patient is a 68y old  Female who presents with a chief complaint of Fever, total body weakness (02 Feb 2018 13:44)        Interval Events:    REVIEW OF SYSTEMS:  Constitutional: [ ] negative [ ] fevers [ ] chills [ ] weight loss [ ] weight gain  HEENT: [ ] negative [ ] dry eyes [ ] eye irritation [ ] postnasal drip [ ] nasal congestion  CV: [ ] negative  [ ] chest pain [ ] orthopnea [ ] palpitations [ ] murmur  Resp: [ ] negative [ ] cough [ ] shortness of breath [ ] dyspnea [ ] wheezing [ ] sputum [ ] hemoptysis  GI: [ ] negative [ ] nausea [ ] vomiting [ ] diarrhea [ ] constipation [ ] abd pain [ ] dysphagia   : [ ] negative [ ] dysuria [ ] nocturia [ ] hematuria [ ] increased urinary frequency  Musculoskeletal: [ ] negative [ ] back pain [ ] myalgias [ ] arthralgias [ ] fracture  Skin: [ ] negative [ ] rash [ ] itch  Neurological: [ ] negative [ ] headache [ ] dizziness [ ] syncope [ ] weakness [ ] numbness  Psychiatric: [ ] negative [ ] anxiety [ ] depression  Endocrine: [ ] negative [ ] diabetes [ ] thyroid problem  Hematologic/Lymphatic: [ ] negative [ ] anemia [ ] bleeding problem  Allergic/Immunologic: [ ] negative [ ] itchy eyes [ ] nasal discharge [ ] hives [ ] angioedema  [ ] All other systems negative  [ ] Unable to assess ROS because ________    OBJECTIVE:  ICU Vital Signs Last 24 Hrs  T(C): 36 (01 Mar 2018 11:46), Max: 37.4 (01 Mar 2018 04:00)  T(F): 96.8 (01 Mar 2018 11:46), Max: 99.3 (01 Mar 2018 04:00)  HR: 93 (01 Mar 2018 13:00) (69 - 94)  BP: 99/48 (01 Mar 2018 13:00) (99/48 - 172/65)  BP(mean): 69 (01 Mar 2018 13:00) (68 - 98)  ABP: --  ABP(mean): --  RR: 30 (01 Mar 2018 13:00) (14 - 30)  SpO2: 99% (01 Mar 2018 13:00) (91% - 100%)    Mode: CPAP with PS, FiO2: 40, PEEP: 5, PS: 5, MAP: 7, PIP: 10    02-28 @ 07:01 - 03-01 @ 07:00  --------------------------------------------------------  IN: 980 mL / OUT: 2100 mL / NET: -1120 mL    03-01 @ 07:01 - 03-01 @ 13:40  --------------------------------------------------------  IN: 60 mL / OUT: 0 mL / NET: 60 mL      CAPILLARY BLOOD GLUCOSE      POCT Blood Glucose.: 194 mg/dL (01 Mar 2018 11:42)      PHYSICAL EXAM:  General:   HEENT:   Lymph Nodes:  Neck:   Respiratory:   Cardiovascular:   Abdomen:   Extremities:   Skin:   Neurological:  Psychiatry:    LINES:    HOSPITAL MEDICATIONS:  Standing Meds:  aspirin  chewable 81 milliGRAM(s) Oral daily  ceFAZolin   IVPB 2000 milliGRAM(s) IV Intermittent every 48 hours  dextrose 5%. 1000 milliLiter(s) IV Continuous <Continuous>  dextrose 5%. 1000 milliLiter(s) IV Continuous <Continuous>  dextrose 5%. 1000 milliLiter(s) IV Continuous <Continuous>  dextrose 50% Injectable 12.5 Gram(s) IV Push once  dextrose 50% Injectable 25 Gram(s) IV Push once  dextrose 50% Injectable 25 Gram(s) IV Push once  epoetin odalis Injectable 4000 Unit(s) IV Push <User Schedule>  heparin  Injectable 5000 Unit(s) SubCutaneous every 8 hours  insulin lispro (HumaLOG) corrective regimen sliding scale   SubCutaneous every 6 hours  insulin NPH human recombinant 10 Unit(s) SubCutaneous <User Schedule>  nystatin Powder 1 Application(s) Topical two times a day      PRN Meds:  chlorhexidine 0.12% Liquid 15 milliLiter(s) Swish and Spit every 4 hours PRN  dextrose Gel 1 Dose(s) Oral once PRN  glucagon  Injectable 1 milliGRAM(s) IntraMuscular once PRN  hydrocortisone 1% Ointment 1 Application(s) Topical every 8 hours PRN  midodrine 10 milliGRAM(s) Oral every other day PRN      LABS:                        7.2    8.5   )-----------( 289      ( 28 Feb 2018 22:56 )             22.8     Hgb Trend: 7.2<--, 7.4<--, 7.2<--, 7.1<--, 7.2<--  02-28    139  |  102  |  46<H>  ----------------------------<  266<H>  3.8   |  24  |  4.34<H>    Ca    8.7      28 Feb 2018 22:56  Phos  4.8     02-28  Mg     2.5     02-28    TPro  7.0  /  Alb  2.3<L>  /  TBili  0.3  /  DBili  x   /  AST  15  /  ALT  <5<L>  /  AlkPhos  284<H>  02-28    Creatinine Trend: 4.34<--, 3.47<--, 5.09<--, 4.07<--, 2.73<--, 3.91<--  PT/INR - ( 28 Feb 2018 22:56 )   PT: 10.9 sec;   INR: 1.00 ratio         PTT - ( 28 Feb 2018 22:56 )  PTT:39.4 sec          MICROBIOLOGY:     Culture - Blood (collected 27 Feb 2018 09:18)  Source: .Blood Blood-Peripheral  Preliminary Report (28 Feb 2018 10:02):    No growth to date.    Culture - Blood (collected 27 Feb 2018 09:18)  Source: .Blood Blood-Venous  Preliminary Report (28 Feb 2018 10:02):    No growth to date.      RADIOLOGY:  [ ] Reviewed and interpreted by me    EKG: CHIEF COMPLAINT:Patient is a 68y old  Female who presents with a chief complaint of Fever, total body weakness (02 Feb 2018 13:44)    Interval Events:  No acute events overnight.       REVIEW OF SYSTEMS:  Constitutional: [ X] negative [ ] fevers [ ] chills [ ] weight loss [ ] weight gain  HEENT: [ X] negative [ ] dry eyes [ ] eye irritation [ ] postnasal drip [ ] nasal congestion  CV: [ X] negative  [ ] chest pain [ ] orthopnea [ ] palpitations [ ] murmur  Resp: [ X] negative [ ] cough [ ] shortness of breath [ ] dyspnea [ ] wheezing [ ] sputum [ ] hemoptysis  GI: [X ] negative [ ] nausea [ ] vomiting [ ] diarrhea [ ] constipation [ ] abd pain [ ] dysphagia   : [X ] negative [ ] dysuria [ ] nocturia [ ] hematuria [ ] increased urinary frequency  Musculoskeletal: [ ] negative [ ] back pain [ ] myalgias [ ] arthralgias [ ] fracture  Skin: [ ] negative [ ] rash [ X] itch  Neurological: [ ] negative [ ] headache [ ] dizziness [ ] syncope [ ] weakness [ ] numbness  Psychiatric: [ ] negative [ ] anxiety [ ] depression  Endocrine: [ ] negative [ ] diabetes [ ] thyroid problem  Hematologic/Lymphatic: [ ] negative [ ] anemia [ ] bleeding problem  Allergic/Immunologic: [ ] negative [ ] itchy eyes [ ] nasal discharge [ ] hives [ ] angioedema  [ ] All other systems negative  [ ] Unable to assess ROS because ________    OBJECTIVE:  ICU Vital Signs Last 24 Hrs  T(C): 36 (01 Mar 2018 11:46), Max: 37.4 (01 Mar 2018 04:00)  T(F): 96.8 (01 Mar 2018 11:46), Max: 99.3 (01 Mar 2018 04:00)  HR: 93 (01 Mar 2018 13:00) (69 - 94)  BP: 99/48 (01 Mar 2018 13:00) (99/48 - 172/65)  BP(mean): 69 (01 Mar 2018 13:00) (68 - 98)  ABP: --  ABP(mean): --  RR: 30 (01 Mar 2018 13:00) (14 - 30)  SpO2: 99% (01 Mar 2018 13:00) (91% - 100%)    Mode: CPAP with PS, FiO2: 40, PEEP: 5, PS: 5, MAP: 7, PIP: 10    02-28 @ 07:01 - 03-01 @ 07:00  --------------------------------------------------------  IN: 980 mL / OUT: 2100 mL / NET: -1120 mL    03-01 @ 07:01 - 03-01 @ 13:40  --------------------------------------------------------  IN: 60 mL / OUT: 0 mL / NET: 60 mL      CAPILLARY BLOOD GLUCOSE      POCT Blood Glucose.: 194 mg/dL (01 Mar 2018 11:42)      General:   Neurology: A&Ox3, nonfocal, CENTENO x 4  Head:  Normocephalic, atraumatic  ENT:  Mucosa moist, no ulcerations  Neck:  Supple, no sinuses or palpable masses  Lymphatic:  No palpable cervical, supraclavicular, axillary or inguinal adenopathy  Respiratory: CTA B/L  CV: RRR, S1S2, no murmur  Abdominal: Soft, NT, ND no palpable mass  MSK: No edema, + peripheral pulses, FROM all 4 extremity    LINES:    HOSPITAL MEDICATIONS:  Standing Meds:  aspirin  chewable 81 milliGRAM(s) Oral daily  ceFAZolin   IVPB 2000 milliGRAM(s) IV Intermittent every 48 hours  dextrose 5%. 1000 milliLiter(s) IV Continuous <Continuous>  dextrose 5%. 1000 milliLiter(s) IV Continuous <Continuous>  dextrose 5%. 1000 milliLiter(s) IV Continuous <Continuous>  dextrose 50% Injectable 12.5 Gram(s) IV Push once  dextrose 50% Injectable 25 Gram(s) IV Push once  dextrose 50% Injectable 25 Gram(s) IV Push once  epoetin odalis Injectable 4000 Unit(s) IV Push <User Schedule>  heparin  Injectable 5000 Unit(s) SubCutaneous every 8 hours  insulin lispro (HumaLOG) corrective regimen sliding scale   SubCutaneous every 6 hours  insulin NPH human recombinant 10 Unit(s) SubCutaneous <User Schedule>  nystatin Powder 1 Application(s) Topical two times a day      PRN Meds:  chlorhexidine 0.12% Liquid 15 milliLiter(s) Swish and Spit every 4 hours PRN  dextrose Gel 1 Dose(s) Oral once PRN  glucagon  Injectable 1 milliGRAM(s) IntraMuscular once PRN  hydrocortisone 1% Ointment 1 Application(s) Topical every 8 hours PRN  midodrine 10 milliGRAM(s) Oral every other day PRN      LABS:                        7.2    8.5   )-----------( 289      ( 28 Feb 2018 22:56 )             22.8     Hgb Trend: 7.2<--, 7.4<--, 7.2<--, 7.1<--, 7.2<--  02-28    139  |  102  |  46<H>  ----------------------------<  266<H>  3.8   |  24  |  4.34<H>    Ca    8.7      28 Feb 2018 22:56  Phos  4.8     02-28  Mg     2.5     02-28    TPro  7.0  /  Alb  2.3<L>  /  TBili  0.3  /  DBili  x   /  AST  15  /  ALT  <5<L>  /  AlkPhos  284<H>  02-28    Creatinine Trend: 4.34<--, 3.47<--, 5.09<--, 4.07<--, 2.73<--, 3.91<--  PT/INR - ( 28 Feb 2018 22:56 )   PT: 10.9 sec;   INR: 1.00 ratio         PTT - ( 28 Feb 2018 22:56 )  PTT:39.4 sec          MICROBIOLOGY:     Culture - Blood (collected 27 Feb 2018 09:18)  Source: .Blood Blood-Peripheral  Preliminary Report (28 Feb 2018 10:02):    No growth to date.    Culture - Blood (collected 27 Feb 2018 09:18)  Source: .Blood Blood-Venous  Preliminary Report (28 Feb 2018 10:02):    No growth to date.      RADIOLOGY:  [ ] Reviewed and interpreted by me    EKG: CHIEF COMPLAINT:Patient is a 68y old  Female who presents with a chief complaint of Fever, total body weakness (02 Feb 2018 13:44)    Interval Events:  No acute events overnight.       REVIEW OF SYSTEMS:  Constitutional: [ X] negative [ ] fevers [ ] chills [ ] weight loss [ ] weight gain  HEENT: [ X] negative [ ] dry eyes [ ] eye irritation [ ] postnasal drip [ ] nasal congestion  CV: [ X] negative  [ ] chest pain [ ] orthopnea [ ] palpitations [ ] murmur  Resp: [ X] negative [ ] cough [ ] shortness of breath [ ] dyspnea [ ] wheezing [ ] sputum [ ] hemoptysis  GI: [X ] negative [ ] nausea [ ] vomiting [ ] diarrhea [ ] constipation [ ] abd pain [ ] dysphagia   : [X ] negative [ ] dysuria [ ] nocturia [ ] hematuria [ ] increased urinary frequency  Musculoskeletal: [ ] negative [ ] back pain [ ] myalgias [ ] arthralgias [ ] fracture  Skin: [ ] negative [ ] rash [ X] itch  Neurological: [ ] negative [ ] headache [ ] dizziness [ ] syncope [ ] weakness [ ] numbness  Psychiatric: [ ] negative [ ] anxiety [ ] depression  Endocrine: [ ] negative [ ] diabetes [ ] thyroid problem  Hematologic/Lymphatic: [ ] negative [ ] anemia [ ] bleeding problem  Allergic/Immunologic: [ ] negative [ ] itchy eyes [ ] nasal discharge [ ] hives [ ] angioedema  [ ] All other systems negative  [ ] Unable to assess ROS because ________    OBJECTIVE:  ICU Vital Signs Last 24 Hrs  T(C): 36 (01 Mar 2018 11:46), Max: 37.4 (01 Mar 2018 04:00)  T(F): 96.8 (01 Mar 2018 11:46), Max: 99.3 (01 Mar 2018 04:00)  HR: 93 (01 Mar 2018 13:00) (69 - 94)  BP: 99/48 (01 Mar 2018 13:00) (99/48 - 172/65)  BP(mean): 69 (01 Mar 2018 13:00) (68 - 98)  ABP: --  ABP(mean): --  RR: 30 (01 Mar 2018 13:00) (14 - 30)  SpO2: 99% (01 Mar 2018 13:00) (91% - 100%)    Mode: CPAP with PS, FiO2: 40, PEEP: 5, PS: 5, MAP: 7, PIP: 10    02-28 @ 07:01 - 03-01 @ 07:00  --------------------------------------------------------  IN: 980 mL / OUT: 2100 mL / NET: -1120 mL    03-01 @ 07:01 - 03-01 @ 13:40  --------------------------------------------------------  IN: 60 mL / OUT: 0 mL / NET: 60 mL      CAPILLARY BLOOD GLUCOSE      POCT Blood Glucose.: 194 mg/dL (01 Mar 2018 11:42)      General:   Neurology: responds appropriately to commands and nods yes/now  Head:  Normocephalic, atraumatic  ENT:  Mucosa moist, no ulcerations  Neck:  Supple, no sinuses or palpable masses  Respiratory: CTA B/L  CV: RRR, S1S2, no murmur  Abdominal: Soft, NT, ND no palpable mass  MSK: No edema, + peripheral pulses, FROM all 4 extremity    LINES:    HOSPITAL MEDICATIONS:  Standing Meds:  aspirin  chewable 81 milliGRAM(s) Oral daily  ceFAZolin   IVPB 2000 milliGRAM(s) IV Intermittent every 48 hours  dextrose 5%. 1000 milliLiter(s) IV Continuous <Continuous>  dextrose 5%. 1000 milliLiter(s) IV Continuous <Continuous>  dextrose 5%. 1000 milliLiter(s) IV Continuous <Continuous>  dextrose 50% Injectable 12.5 Gram(s) IV Push once  dextrose 50% Injectable 25 Gram(s) IV Push once  dextrose 50% Injectable 25 Gram(s) IV Push once  epoetin odalis Injectable 4000 Unit(s) IV Push <User Schedule>  heparin  Injectable 5000 Unit(s) SubCutaneous every 8 hours  insulin lispro (HumaLOG) corrective regimen sliding scale   SubCutaneous every 6 hours  insulin NPH human recombinant 10 Unit(s) SubCutaneous <User Schedule>  nystatin Powder 1 Application(s) Topical two times a day      PRN Meds:  chlorhexidine 0.12% Liquid 15 milliLiter(s) Swish and Spit every 4 hours PRN  dextrose Gel 1 Dose(s) Oral once PRN  glucagon  Injectable 1 milliGRAM(s) IntraMuscular once PRN  hydrocortisone 1% Ointment 1 Application(s) Topical every 8 hours PRN  midodrine 10 milliGRAM(s) Oral every other day PRN      LABS:                        7.2    8.5   )-----------( 289      ( 28 Feb 2018 22:56 )             22.8     Hgb Trend: 7.2<--, 7.4<--, 7.2<--, 7.1<--, 7.2<--  02-28    139  |  102  |  46<H>  ----------------------------<  266<H>  3.8   |  24  |  4.34<H>    Ca    8.7      28 Feb 2018 22:56  Phos  4.8     02-28  Mg     2.5     02-28    TPro  7.0  /  Alb  2.3<L>  /  TBili  0.3  /  DBili  x   /  AST  15  /  ALT  <5<L>  /  AlkPhos  284<H>  02-28    Creatinine Trend: 4.34<--, 3.47<--, 5.09<--, 4.07<--, 2.73<--, 3.91<--  PT/INR - ( 28 Feb 2018 22:56 )   PT: 10.9 sec;   INR: 1.00 ratio         PTT - ( 28 Feb 2018 22:56 )  PTT:39.4 sec          MICROBIOLOGY:     Culture - Blood (collected 27 Feb 2018 09:18)  Source: .Blood Blood-Peripheral  Preliminary Report (28 Feb 2018 10:02):    No growth to date.    Culture - Blood (collected 27 Feb 2018 09:18)  Source: .Blood Blood-Venous  Preliminary Report (28 Feb 2018 10:02):    No growth to date.      RADIOLOGY:  [ ] Reviewed and interpreted by me    EKG:

## 2018-03-01 NOTE — PROGRESS NOTE ADULT - SUBJECTIVE AND OBJECTIVE BOX
full note to follow -  -would continue Ancef for 4 weeks after last +Blood culture  -removed Mediport if not in current use - though does not appear infected at present  can dose 2 gm just after HD and 3 gms after HD if next dialysis scheduled in 3 days ("2-2-3")

## 2018-03-01 NOTE — CONSULT NOTE ADULT - CONSULT REASON
DM2 and hyperglycemia management
Pneumoperitoneum
Rash
Renal Failure
Respiratory Failure
S/p PEA, L PCA stroke
bacteremia
intraparenchymal chest tubes
pneumoperitoneum
Evaluate Rehabilitation Needs
Respiratory Failure

## 2018-03-01 NOTE — AIRWAY REMOVAL NOTE  ADULT & PEDS - ARTIFICAL AIRWAY REMOVAL COMMENTS
Written order for extubation verified. The patient was identified by full name and birth date compared to the identification band. Present during the procedure was TAMRA Patten.
Written order for extubation verified. The patient was identified by full name and birth date compared to the identification band. Present during the procedure was Miranda Pérez

## 2018-03-01 NOTE — CONSULT NOTE ADULT - ASSESSMENT
68y/o F hx Breast Cancer (diagnosed in Feb 2017) currently on Herceptin (last dose Jan 20th), has mediport 2017 HTN, Diabetes on 70/30, presenting with Fever 01/30, found to have influenza Ah3 + while in ED underwent PEA arrest with ROSC intubated received tpa for presumed PE, developed b/l pneumothoraces requiring chest tubes and transferred to MICU. Initial blood cultures negative. Required new HD during this hospital stay via Rt IJ ilene. Patient extubated and downgraded to floor where she had RRT and re-intubated with concern for aspiration pneumonia 02/22. Blood cultures from that time + Staph aureus 1 bottle, cleared on 02/24, sputum cx + for Staph aureus  Colonization of sputum with transient bacteremia vs superimposed Staph pneumonia following Influenza Ah3 infection.   RT IJ replaced 02/24. TTE no vegetations 02/26.     #Staph aureus bacteremia   c/w cefazolin 2g iv q48h after HD sessions.  Will require total 4 weeks from 02/24   If she has regularly scheduled HD as on a M/T/T or T/T/S schedule would dose 2g/2g/3g with each HD  Consider mediport removal

## 2018-03-01 NOTE — CONSULT NOTE ADULT - PROVIDER SPECIALTY LIST ADULT
Dermatology
Endocrinology
Gastroenterology
Infectious Disease
MICU
Nephrology
Neurology
Rehab Medicine
Surgery
Thoracic Surgery
Pulmonology

## 2018-03-01 NOTE — PROGRESS NOTE ADULT - SUBJECTIVE AND OBJECTIVE BOX
Diabetes Follow up note:  Interval Hx:  67 year old female w/uncontrolled T2DM w/complications here w/Flu c/b PEA arrest and Acute respiratory failure. Pt seen in MICU. Extubated this AM on face tent, receiving HD at bedside. Tube feeds currently off w/NGT in place. Pt received 10 units of NPH at noon today.     Review of Systems:  General: No complaints.   GI: Denies n/v/abd pain  CV: No CP/SOB  ENDO: No S&Sx of hypoglycemia  MEDS:    insulin lispro (HumaLOG) corrective regimen sliding scale   SubCutaneous every 6 hours  insulin NPH human recombinant 10 Unit(s) SubCutaneous <User Schedule>    ceFAZolin   IVPB 2000 milliGRAM(s) IV Intermittent every 48 hours    Allergies    doxycycline (Rash)  isoniazid (Rash)  NIFEdipine (Urticaria; Hives)  vitamin E (Short breath; Urticaria; Hives)          PE:  General: Female lying in bed. NAD.   Vital Signs Last 24 Hrs  T(C): 36 (01 Mar 2018 11:46), Max: 37.4 (01 Mar 2018 04:00)  T(F): 96.8 (01 Mar 2018 11:46), Max: 99.3 (01 Mar 2018 04:00)  HR: 98 (01 Mar 2018 14:00) (69 - 98)  BP: 110/58 (01 Mar 2018 14:00) (99/48 - 172/65)  BP(mean): 79 (01 Mar 2018 14:00) (68 - 98)  RR: 23 (01 Mar 2018 14:00) (14 - 30)  SpO2: 97% (01 Mar 2018 14:00) (91% - 100%)  CV: S1, S2. NSR on monitor.   Abd: Soft, NT,ND, NGT in place.   Extremities: Warm  Neuro: Awake. Follows commands. Not verbalizing at time of vist.     LABS:    POCT Blood Glucose.: 194 mg/dL (03-01-18 @ 11:42)  POCT Blood Glucose.: 136 mg/dL (03-01-18 @ 05:00)  POCT Blood Glucose.: 182 mg/dL (02-28-18 @ 17:47)  POCT Blood Glucose.: 212 mg/dL (02-28-18 @ 12:13)  POCT Blood Glucose.: 260 mg/dL (02-28-18 @ 05:22)  POCT Blood Glucose.: 182 mg/dL (02-27-18 @ 23:51)  POCT Blood Glucose.: 223 mg/dL (02-27-18 @ 17:31)  POCT Blood Glucose.: 159 mg/dL (02-27-18 @ 12:55)  POCT Blood Glucose.: 219 mg/dL (02-27-18 @ 05:53)  POCT Blood Glucose.: 186 mg/dL (02-26-18 @ 17:03)                            7.2    8.5   )-----------( 289      ( 28 Feb 2018 22:56 )             22.8       02-28    139  |  102  |  46<H>  ----------------------------<  266<H>  3.8   |  24  |  4.34<H>    Ca    8.7      28 Feb 2018 22:56  Phos  4.8     02-28  Mg     2.5     02-28    TPro  7.0  /  Alb  2.3<L>  /  TBili  0.3  /  DBili  x   /  AST  15  /  ALT  <5<L>  /  AlkPhos  284<H>  02-28        Hemoglobin A1C, Whole Blood: 8.6 % <H> [4.0 - 5.6] (01-31-18 @ 07:15)  Hemoglobin A1C, Whole Blood: 8.7 % <H> [4.0 - 5.6] (01-31-18 @ 05:51)            Contact number: marcelino [phone] or [phone]

## 2018-03-01 NOTE — PROGRESS NOTE ADULT - ASSESSMENT
67F PMH HTN, DMT2, Breast Cancer (diagnosed in Feb 2017) currently on trastuzumab (last dose Jan 20th), originally presented 1/30 with fever found to have influenza subsequently went into PEA arrest x 2 with ROSC, complicated by bilateral pneumothoraces with pneumomediastinum s/p 3 chest tubes, and also new ESRD requiring almost daily HD, readmitted to the MICU for hypoxic respiratory failure likely 2/2 aspiration pneumonia, pending extubation.     # Neuro:  - patient alert and following commands  - Known watershed ischemia of the occiput from low flow state during admission 2/2 flu    # CV:  - currently off pressors  - MAPs > 65  - holding antihypertensives in setting of septic shock; normotensive BPs at this time (120s-130s systolics)    # Pulm:  - currently intubated for hypoxemic resp fail 2/2 to asp pna; trial of extubation today  - s/p 5 days of vanc/ zosyn, 3 days of nafcillin, now on Ancef (swtiched from nafcillin as patient may have allergic reaction to naf)- dose ancef with dialysis (2mg after dialysis)    # Renal:  - plan for dialysis today  - Patient pending permacath placement by IR once afebrile and cleared MSSA  - Appreciate renal recommendations    # ID: concern for aspiration PNA with blood culture and sputum positive for staph aureus    - last BCX from 2/24 shows clearance of infection  - MSSA bacteremia s/p 5 days of vanc/ zosyn, 3 days of nafcillin, now on Ancef switched from nafcillin as patient may have allergic reaction to naf)- dose ancef with dialysis   - IR for permacath- IR states permacath cannot be placed in MICU if patient has shiley  - echo shows no signs of vegetation    # GI:  - continue feeds via NG, Nepro @ 40cc/hr x 24 hrs to provide 960cc fluids recommended by nutrition  - GI ppx with protonix 40mg daily    # Endocrine:  - Continue tube feeds  - Continue with 10U Lantus and FS q6h with low SSI.    # Heme:  - Will continue to trend H/H  - Currently holding Herceptin    # Skin:  - improvement in pruritus no changes in rash  - continue to monitor     # DVT ppx:  - HSC 5000 units Q8 hours

## 2018-03-01 NOTE — CONSULT NOTE ADULT - CONSULT REQUESTED DATE/TIME
01-Feb-2018 14:50
01-Feb-2018 15:07
01-Mar-2018 17:30
07-Feb-2018 14:30
16-Feb-2018 14:52
21-Feb-2018 11:15
30-Jan-2018
31-Jan-2018 15:09
31-Jan-2018 16:42
12-Feb-2018 14:23
16-Feb-2018 11:23

## 2018-03-02 LAB
ALBUMIN SERPL ELPH-MCNC: 2.7 G/DL — LOW (ref 3.3–5)
ALP SERPL-CCNC: 312 U/L — HIGH (ref 40–120)
ALT FLD-CCNC: <5 U/L RC — LOW (ref 10–45)
ANION GAP SERPL CALC-SCNC: 13 MMOL/L — SIGNIFICANT CHANGE UP (ref 5–17)
APTT BLD: 35.4 SEC — SIGNIFICANT CHANGE UP (ref 27.5–37.4)
AST SERPL-CCNC: 22 U/L — SIGNIFICANT CHANGE UP (ref 10–40)
BASOPHILS # BLD AUTO: 0.1 K/UL — SIGNIFICANT CHANGE UP (ref 0–0.2)
BASOPHILS NFR BLD AUTO: 0.8 % — SIGNIFICANT CHANGE UP (ref 0–2)
BILIRUB SERPL-MCNC: 0.2 MG/DL — SIGNIFICANT CHANGE UP (ref 0.2–1.2)
BUN SERPL-MCNC: 34 MG/DL — HIGH (ref 7–23)
CALCIUM SERPL-MCNC: 8.8 MG/DL — SIGNIFICANT CHANGE UP (ref 8.4–10.5)
CHLORIDE SERPL-SCNC: 98 MMOL/L — SIGNIFICANT CHANGE UP (ref 96–108)
CO2 SERPL-SCNC: 27 MMOL/L — SIGNIFICANT CHANGE UP (ref 22–31)
CREAT SERPL-MCNC: 3.27 MG/DL — HIGH (ref 0.5–1.3)
EOSINOPHIL # BLD AUTO: 0 K/UL — SIGNIFICANT CHANGE UP (ref 0–0.5)
EOSINOPHIL NFR BLD AUTO: 0.2 % — SIGNIFICANT CHANGE UP (ref 0–6)
GLUCOSE BLDC GLUCOMTR-MCNC: 236 MG/DL — HIGH (ref 70–99)
GLUCOSE BLDC GLUCOMTR-MCNC: 246 MG/DL — HIGH (ref 70–99)
GLUCOSE BLDC GLUCOMTR-MCNC: 329 MG/DL — HIGH (ref 70–99)
GLUCOSE SERPL-MCNC: 193 MG/DL — HIGH (ref 70–99)
HCT VFR BLD CALC: 23.5 % — LOW (ref 34.5–45)
HCT VFR BLD CALC: 23.7 % — LOW (ref 34.5–45)
HGB BLD-MCNC: 7.5 G/DL — LOW (ref 11.5–15.5)
HGB BLD-MCNC: 7.6 G/DL — LOW (ref 11.5–15.5)
INR BLD: 1.11 RATIO — SIGNIFICANT CHANGE UP (ref 0.88–1.16)
LYMPHOCYTES # BLD AUTO: 0.8 K/UL — LOW (ref 1–3.3)
LYMPHOCYTES # BLD AUTO: 9.4 % — LOW (ref 13–44)
MAGNESIUM SERPL-MCNC: 2.3 MG/DL — SIGNIFICANT CHANGE UP (ref 1.6–2.6)
MCHC RBC-ENTMCNC: 31.2 PG — SIGNIFICANT CHANGE UP (ref 27–34)
MCHC RBC-ENTMCNC: 31.6 PG — SIGNIFICANT CHANGE UP (ref 27–34)
MCHC RBC-ENTMCNC: 31.8 GM/DL — LOW (ref 32–36)
MCHC RBC-ENTMCNC: 32.1 GM/DL — SIGNIFICANT CHANGE UP (ref 32–36)
MCV RBC AUTO: 98.1 FL — SIGNIFICANT CHANGE UP (ref 80–100)
MCV RBC AUTO: 98.3 FL — SIGNIFICANT CHANGE UP (ref 80–100)
MONOCYTES # BLD AUTO: 1 K/UL — HIGH (ref 0–0.9)
MONOCYTES NFR BLD AUTO: 11.9 % — SIGNIFICANT CHANGE UP (ref 2–14)
NEUTROPHILS # BLD AUTO: 6.7 K/UL — SIGNIFICANT CHANGE UP (ref 1.8–7.4)
NEUTROPHILS NFR BLD AUTO: 77.7 % — HIGH (ref 43–77)
PHOSPHATE SERPL-MCNC: 3.5 MG/DL — SIGNIFICANT CHANGE UP (ref 2.5–4.5)
PLATELET # BLD AUTO: 391 K/UL — SIGNIFICANT CHANGE UP (ref 150–400)
PLATELET # BLD AUTO: 400 K/UL — SIGNIFICANT CHANGE UP (ref 150–400)
POTASSIUM SERPL-MCNC: 3.5 MMOL/L — SIGNIFICANT CHANGE UP (ref 3.5–5.3)
POTASSIUM SERPL-SCNC: 3.5 MMOL/L — SIGNIFICANT CHANGE UP (ref 3.5–5.3)
PROT SERPL-MCNC: 7 G/DL — SIGNIFICANT CHANGE UP (ref 6–8.3)
PROTHROM AB SERPL-ACNC: 12 SEC — SIGNIFICANT CHANGE UP (ref 9.8–12.7)
RBC # BLD: 2.39 M/UL — LOW (ref 3.8–5.2)
RBC # BLD: 2.42 M/UL — LOW (ref 3.8–5.2)
RBC # FLD: 16.6 % — HIGH (ref 10.3–14.5)
RBC # FLD: 16.7 % — HIGH (ref 10.3–14.5)
SODIUM SERPL-SCNC: 138 MMOL/L — SIGNIFICANT CHANGE UP (ref 135–145)
WBC # BLD: 11.5 K/UL — HIGH (ref 3.8–10.5)
WBC # BLD: 8.6 K/UL — SIGNIFICANT CHANGE UP (ref 3.8–10.5)
WBC # FLD AUTO: 11.5 K/UL — HIGH (ref 3.8–10.5)
WBC # FLD AUTO: 8.6 K/UL — SIGNIFICANT CHANGE UP (ref 3.8–10.5)

## 2018-03-02 PROCEDURE — 99232 SBSQ HOSP IP/OBS MODERATE 35: CPT

## 2018-03-02 PROCEDURE — 99233 SBSQ HOSP IP/OBS HIGH 50: CPT | Mod: GC

## 2018-03-02 RX ORDER — SODIUM CHLORIDE 9 MG/ML
3 INJECTION INTRAMUSCULAR; INTRAVENOUS; SUBCUTANEOUS EVERY 8 HOURS
Qty: 0 | Refills: 0 | Status: DISCONTINUED | OUTPATIENT
Start: 2018-03-02 | End: 2018-03-03

## 2018-03-02 RX ORDER — CALAMINE 8% AND ZINC OXIDE 8% 160 MG/ML
1 LOTION TOPICAL DAILY
Qty: 0 | Refills: 0 | Status: DISCONTINUED | OUTPATIENT
Start: 2018-03-02 | End: 2018-03-30

## 2018-03-02 RX ORDER — HUMAN INSULIN 100 [IU]/ML
12 INJECTION, SUSPENSION SUBCUTANEOUS EVERY 6 HOURS
Qty: 0 | Refills: 0 | Status: DISCONTINUED | OUTPATIENT
Start: 2018-03-02 | End: 2018-03-03

## 2018-03-02 RX ORDER — LORATADINE 10 MG/1
10 TABLET ORAL DAILY
Qty: 0 | Refills: 0 | Status: DISCONTINUED | OUTPATIENT
Start: 2018-03-02 | End: 2018-03-05

## 2018-03-02 RX ADMIN — HEPARIN SODIUM 5000 UNIT(S): 5000 INJECTION INTRAVENOUS; SUBCUTANEOUS at 05:58

## 2018-03-02 RX ADMIN — SODIUM CHLORIDE 3 MILLILITER(S): 9 INJECTION INTRAMUSCULAR; INTRAVENOUS; SUBCUTANEOUS at 22:00

## 2018-03-02 RX ADMIN — HEPARIN SODIUM 5000 UNIT(S): 5000 INJECTION INTRAVENOUS; SUBCUTANEOUS at 21:38

## 2018-03-02 RX ADMIN — CALAMINE 8% AND ZINC OXIDE 8% 1 APPLICATION(S): 160 LOTION TOPICAL at 12:19

## 2018-03-02 RX ADMIN — Medication 4: at 05:59

## 2018-03-02 RX ADMIN — HEPARIN SODIUM 5000 UNIT(S): 5000 INJECTION INTRAVENOUS; SUBCUTANEOUS at 14:30

## 2018-03-02 RX ADMIN — HUMAN INSULIN 10 UNIT(S): 100 INJECTION, SUSPENSION SUBCUTANEOUS at 12:30

## 2018-03-02 RX ADMIN — NYSTATIN CREAM 1 APPLICATION(S): 100000 CREAM TOPICAL at 18:10

## 2018-03-02 RX ADMIN — Medication 8: at 12:30

## 2018-03-02 RX ADMIN — HUMAN INSULIN 12 UNIT(S): 100 INJECTION, SUSPENSION SUBCUTANEOUS at 18:11

## 2018-03-02 RX ADMIN — Medication 81 MILLIGRAM(S): at 12:19

## 2018-03-02 RX ADMIN — Medication 4: at 18:10

## 2018-03-02 RX ADMIN — LORATADINE 10 MILLIGRAM(S): 10 TABLET ORAL at 12:19

## 2018-03-02 RX ADMIN — SODIUM CHLORIDE 3 MILLILITER(S): 9 INJECTION INTRAMUSCULAR; INTRAVENOUS; SUBCUTANEOUS at 14:14

## 2018-03-02 RX ADMIN — NYSTATIN CREAM 1 APPLICATION(S): 100000 CREAM TOPICAL at 05:58

## 2018-03-02 RX ADMIN — Medication 2: at 00:47

## 2018-03-02 RX ADMIN — HUMAN INSULIN 10 UNIT(S): 100 INJECTION, SUSPENSION SUBCUTANEOUS at 00:48

## 2018-03-02 NOTE — PROGRESS NOTE ADULT - PROBLEM SELECTOR PLAN 1
-test BG Q6h  -Increase NPH 12 units Q6h (hold NPH if tube feeds off)  -c/w Humalog moderate correction scale q6h. Once tube feeds at goal, would reduce scale to low correction scale q6h  -If tube feeds on hold/NPO-can give Lantus 12 units daily  -[facility]  pager: [phone]

## 2018-03-02 NOTE — PROGRESS NOTE ADULT - ATTENDING COMMENTS
1. Pulmonary: Tolerating extubation. requiring q2-3 hours suctioning. Has decent cough but with thick secretions.  2. ID Continue Ancef for MSSA bacteremia and pneumonia  3. Renal failure due to ATN. For dialysis today.

## 2018-03-02 NOTE — PROGRESS NOTE ADULT - SUBJECTIVE AND OBJECTIVE BOX
[facility] Division of Kidney Diseases & Hypertension  FOLLOW UP NOTE  [phone]--------------------------------------------------------------------------------  Chief Complaint:Cardiac arrest      24 hour events/subjective:    Patient extubated yesterday.  Currently on face mask and tolerating it well  Last HD was done yesterday with 1.7 kg fluid removal    PAST HISTORY  --------------------------------------------------------------------------------  No significant changes to PMH, PSH, FHx, SHx, unless otherwise noted    ALLERGIES & MEDICATIONS  --------------------------------------------------------------------------------  Allergies    doxycycline (Rash)  isoniazid (Rash)  NIFEdipine (Urticaria; Hives)  vitamin E (Short breath; Urticaria; Hives)    Intolerances      Standing Inpatient Medications  aspirin  chewable 81 milliGRAM(s) Oral daily  ceFAZolin   IVPB 2000 milliGRAM(s) IV Intermittent every 48 hours  dextrose 5%. 1000 milliLiter(s) IV Continuous <Continuous>  dextrose 5%. 1000 milliLiter(s) IV Continuous <Continuous>  dextrose 5%. 1000 milliLiter(s) IV Continuous <Continuous>  dextrose 50% Injectable 12.5 Gram(s) IV Push once  dextrose 50% Injectable 25 Gram(s) IV Push once  dextrose 50% Injectable 25 Gram(s) IV Push once  epoetin odalis Injectable 4000 Unit(s) IV Push <User Schedule>  heparin  Injectable 5000 Unit(s) SubCutaneous every 8 hours  insulin lispro (HumaLOG) corrective regimen sliding scale   SubCutaneous every 6 hours  insulin NPH human recombinant 10 Unit(s) SubCutaneous <User Schedule>  loratadine 10 milliGRAM(s) Oral daily  nystatin Powder 1 Application(s) Topical two times a day    PRN Inpatient Medications  chlorhexidine 0.12% Liquid 15 milliLiter(s) Swish and Spit every 4 hours PRN  dextrose Gel 1 Dose(s) Oral once PRN  glucagon  Injectable 1 milliGRAM(s) IntraMuscular once PRN  hydrocortisone 1% Ointment 1 Application(s) Topical every 8 hours PRN  midodrine 10 milliGRAM(s) Oral every other day PRN      REVIEW OF SYSTEMS  --------------------------------------------------------------------------------  Gen: No  fevers/chills  Skin: No rashes  Head/Eyes/Ears/Mouth: No headache; Normal hearing; Normal vision w/o blurriness  Respiratory: No dyspnea, cough, wheezing, hemoptysis  CV: No chest pain, PND, orthopnea  GI: No abdominal pain, diarrhea, constipation, nausea, vomiting  : No increased frequency, dysuria, hematuria, nocturia  MSK: No joint pain/swelling; no back pain; no edema  Neuro: No dizziness/lightheadedness, weakness, seizures, numbness, tingling      All other systems were reviewed and are negative, except as noted.    VITALS/PHYSICAL EXAM  --------------------------------------------------------------------------------  T(C): 36.7 (03-02-18 @ 07:00), Max: 37.8 (03-02-18 @ 06:00)  HR: 100 (03-02-18 @ 09:00) (87 - 106)  BP: 159/66 (03-02-18 @ 09:00) (91/69 - 171/86)  RR: 24 (03-02-18 @ 09:00) (16 - 30)  SpO2: 96% (03-02-18 @ 09:00) (56% - 100%)  Wt(kg): --        03-01-18 @ 07:01  -  03-02-18 @ 07:00  --------------------------------------------------------  IN: 790 mL / OUT: 1700 mL / NET: -910 mL    03-02-18 @ 07:01  -  03-02-18 @ 09:48  --------------------------------------------------------  IN: 120 mL / OUT: 0 mL / NET: 120 mL      Physical Exam:  	  GEn- NAD, on face mask   Chest- vent sounds  Heart- S1/S2 rrr  Abdomen- soft, nd  Extremities- no edema  Vascular access: Right IJ non tunneled HD catheter present.       LABS/STUDIES  --------------------------------------------------------------------------------              7.6    11.5  >-----------<  391      [03-01-18 @ 23:51]              23.7     138  |  98  |  34  ----------------------------<  193      [03-01-18 @ 23:51]  3.5   |  27  |  3.27        Ca     8.8     [03-01-18 @ 23:51]      Mg     2.3     [03-01-18 @ 23:51]      Phos  3.5     [03-01-18 @ 23:51]    TPro  7.0  /  Alb  2.7  /  TBili  0.2  /  DBili  x   /  AST  22  /  ALT  <5  /  AlkPhos  312  [03-01-18 @ 23:51]    PT/INR: PT 12.0 , INR 1.11       [03-01-18 @ 23:51]  PTT: 35.4       [03-01-18 @ 23:51]      Creatinine Trend:  SCr 3.27 [03-01 @ 23:51]  SCr 4.34 [02-28 @ 22:56]  SCr 3.47 [02-28 @ 00:17]  SCr 5.09 [02-26 @ 23:45]  SCr 4.07 [02-25 @ 22:49]          HBsAb <3.0      [02-28-18 @ 20:23]  HBsAg Nonreact      [02-28-18 @ 20:23]  HCV 0.24, Nonreact      [02-28-18 @ 20:23]

## 2018-03-02 NOTE — PROGRESS NOTE ADULT - SUBJECTIVE AND OBJECTIVE BOX
CHIEF COMPLAINT:Patient is a 68y old  Female who presents with a chief complaint of Fever, total body weakness (02 Feb 2018 13:44)    Interval Events:  No acute events overnight.    REVIEW OF SYSTEMS:  Constitutional: [ ] negative [ ] fevers [ ] chills [ ] weight loss [ ] weight gain  HEENT: [ ] negative [ ] dry eyes [ ] eye irritation [ ] postnasal drip [ ] nasal congestion  CV: [ ] negative  [ ] chest pain [ ] orthopnea [ ] palpitations [ ] murmur  Resp: [ ] negative [ ] cough [ ] shortness of breath [ ] dyspnea [ ] wheezing [ ] sputum [ ] hemoptysis  GI: [ ] negative [ ] nausea [ ] vomiting [ ] diarrhea [ ] constipation [ ] abd pain [ ] dysphagia   : [ ] negative [ ] dysuria [ ] nocturia [ ] hematuria [ ] increased urinary frequency  Musculoskeletal: [ ] negative [ ] back pain [ ] myalgias [ ] arthralgias [ ] fracture  Skin: [ ] negative [ ] rash [ ] itch  Neurological: [ ] negative [ ] headache [ ] dizziness [ ] syncope [ ] weakness [ ] numbness  Psychiatric: [ ] negative [ ] anxiety [ ] depression  Endocrine: [ ] negative [ ] diabetes [ ] thyroid problem  Hematologic/Lymphatic: [ ] negative [ ] anemia [ ] bleeding problem  Allergic/Immunologic: [ ] negative [ ] itchy eyes [ ] nasal discharge [ ] hives [ ] angioedema  [ ] All other systems negative  [ ] Unable to assess ROS because ________    OBJECTIVE:  ICU Vital Signs Last 24 Hrs  T(C): 37.8 (02 Mar 2018 06:00), Max: 37.8 (02 Mar 2018 06:00)  T(F): 100 (02 Mar 2018 06:00), Max: 100 (02 Mar 2018 06:00)  HR: 94 (02 Mar 2018 07:00) (80 - 106)  BP: 145/65 (02 Mar 2018 07:00) (91/69 - 162/65)  BP(mean): 94 (02 Mar 2018 07:00) (69 - 96)  ABP: --  ABP(mean): --  RR: 18 (02 Mar 2018 07:00) (16 - 30)  SpO2: 96% (02 Mar 2018 07:00) (56% - 100%)    Mode: CPAP with PS, FiO2: 40, PEEP: 5, PS: 5, MAP: 7, PIP: 10    03-01 @ 07:01  -  03-02 @ 07:00  --------------------------------------------------------  IN: 790 mL / OUT: 1700 mL / NET: -910 mL      CAPILLARY BLOOD GLUCOSE  166 (01 Mar 2018 18:00)      POCT Blood Glucose.: 236 mg/dL (02 Mar 2018 05:09)      PHYSICAL EXAM:  General:   HEENT:   Lymph Nodes:  Neck:   Respiratory:   Cardiovascular:   Abdomen:   Extremities:   Skin:   Neurological:  Psychiatry:    LINES:    HOSPITAL MEDICATIONS:  Standing Meds:  aspirin  chewable 81 milliGRAM(s) Oral daily  ceFAZolin   IVPB 2000 milliGRAM(s) IV Intermittent every 48 hours  dextrose 5%. 1000 milliLiter(s) IV Continuous <Continuous>  dextrose 5%. 1000 milliLiter(s) IV Continuous <Continuous>  dextrose 5%. 1000 milliLiter(s) IV Continuous <Continuous>  dextrose 50% Injectable 12.5 Gram(s) IV Push once  dextrose 50% Injectable 25 Gram(s) IV Push once  dextrose 50% Injectable 25 Gram(s) IV Push once  epoetin odalis Injectable 4000 Unit(s) IV Push <User Schedule>  heparin  Injectable 5000 Unit(s) SubCutaneous every 8 hours  insulin lispro (HumaLOG) corrective regimen sliding scale   SubCutaneous every 6 hours  insulin NPH human recombinant 10 Unit(s) SubCutaneous <User Schedule>  nystatin Powder 1 Application(s) Topical two times a day      PRN Meds:  chlorhexidine 0.12% Liquid 15 milliLiter(s) Swish and Spit every 4 hours PRN  dextrose Gel 1 Dose(s) Oral once PRN  glucagon  Injectable 1 milliGRAM(s) IntraMuscular once PRN  hydrocortisone 1% Ointment 1 Application(s) Topical every 8 hours PRN  midodrine 10 milliGRAM(s) Oral every other day PRN      LABS:                        7.6    11.5  )-----------( 391      ( 01 Mar 2018 23:51 )             23.7     Hgb Trend: 7.6<--, 7.2<--, 7.4<--, 7.2<--, 7.1<--  03-01    138  |  98  |  34<H>  ----------------------------<  193<H>  3.5   |  27  |  3.27<H>    Ca    8.8      01 Mar 2018 23:51  Phos  3.5     03-01  Mg     2.3     03-01    TPro  7.0  /  Alb  2.7<L>  /  TBili  0.2  /  DBili  x   /  AST  22  /  ALT  <5<L>  /  AlkPhos  312<H>  03-01    Creatinine Trend: 3.27<--, 4.34<--, 3.47<--, 5.09<--, 4.07<--, 2.73<--  PT/INR - ( 01 Mar 2018 23:51 )   PT: 12.0 sec;   INR: 1.11 ratio         PTT - ( 01 Mar 2018 23:51 )  PTT:35.4 sec          MICROBIOLOGY:     Culture - Blood (collected 27 Feb 2018 09:18)  Source: .Blood Blood-Peripheral  Preliminary Report (28 Feb 2018 10:02):    No growth to date.      Culture - Blood (collected 27 Feb 2018 09:18)  Source: .Blood Blood-Venous  Preliminary Report (28 Feb 2018 10:02):    No growth to date.      RADIOLOGY:  [ ] Reviewed and interpreted by me    EKG: CHIEF COMPLAINT:Patient is a 68y old  Female who presents with a chief complaint of Fever, total body weakness (02 Feb 2018 13:44)    Interval Events:  No acute events overnight.    REVIEW OF SYSTEMS:  Constitutional: [ X] negative [ ] fevers [ ] chills [ ] weight loss [ ] weight gain  HEENT: [ X] negative [ ] dry eyes [ ] eye irritation [ ] postnasal drip [ ] nasal congestion  CV: [ X] negative  [ ] chest pain [ ] orthopnea [ ] palpitations [ ] murmur  Resp: [ X] negative [ ] cough [ ] shortness of breath [ ] dyspnea [ ] wheezing [ ] sputum [ ] hemoptysis  GI: [X ] negative [ ] nausea [ ] vomiting [ ] diarrhea [ ] constipation [ ] abd pain [ ] dysphagia   : [X ] negative [ ] dysuria [ ] nocturia [ ] hematuria [ ] increased urinary frequency  Musculoskeletal: [X ] negative [ ] back pain [ ] myalgias [ ] arthralgias [ ] fracture  Skin: [ ] negative [ ] rash [ X] itch  Neurological: [ ] negative [ ] headache [ ] dizziness [ ] syncope [ ] weakness [ ] numbness  Psychiatric: [ ] negative [ ] anxiety [ ] depression  Endocrine: [ ] negative [ ] diabetes [ ] thyroid problem  Hematologic/Lymphatic: [ ] negative [ ] anemia [ ] bleeding problem  Allergic/Immunologic: [ ] negative [ ] itchy eyes [ ] nasal discharge [ ] hives [ ] angioedema  [ X] All other systems negative  [ ] Unable to assess ROS because ________    OBJECTIVE:  ICU Vital Signs Last 24 Hrs  T(C): 37.8 (02 Mar 2018 06:00), Max: 37.8 (02 Mar 2018 06:00)  T(F): 100 (02 Mar 2018 06:00), Max: 100 (02 Mar 2018 06:00)  HR: 94 (02 Mar 2018 07:00) (80 - 106)  BP: 145/65 (02 Mar 2018 07:00) (91/69 - 162/65)  BP(mean): 94 (02 Mar 2018 07:00) (69 - 96)  ABP: --  ABP(mean): --  RR: 18 (02 Mar 2018 07:00) (16 - 30)  SpO2: 96% (02 Mar 2018 07:00) (56% - 100%)    Mode: CPAP with PS, FiO2: 40, PEEP: 5, PS: 5, MAP: 7, PIP: 10    03-01 @ 07:01  -  03-02 @ 07:00  --------------------------------------------------------  IN: 790 mL / OUT: 1700 mL / NET: -910 mL      CAPILLARY BLOOD GLUCOSE  166 (01 Mar 2018 18:00)      POCT Blood Glucose.: 236 mg/dL (02 Mar 2018 05:09)      General: NAD; laying in bed on face tent  Neurology: A&Ox3, follows commands  ENT:  Mucosa moist, no ulcerations  Neck:  Supple, no sinuses or palpable masses  Respiratory: CTA B/L  CV: RRR, S1S2, no murmur  Abdominal: Soft, NT, ND no palpable mass  MSK: No edema, + peripheral pulses,  Skin: erythematous macular rash on upper extremities b/l    LINES:    HOSPITAL MEDICATIONS:  Standing Meds:  aspirin  chewable 81 milliGRAM(s) Oral daily  ceFAZolin   IVPB 2000 milliGRAM(s) IV Intermittent every 48 hours  dextrose 5%. 1000 milliLiter(s) IV Continuous <Continuous>  dextrose 5%. 1000 milliLiter(s) IV Continuous <Continuous>  dextrose 5%. 1000 milliLiter(s) IV Continuous <Continuous>  dextrose 50% Injectable 12.5 Gram(s) IV Push once  dextrose 50% Injectable 25 Gram(s) IV Push once  dextrose 50% Injectable 25 Gram(s) IV Push once  epoetin odalis Injectable 4000 Unit(s) IV Push <User Schedule>  heparin  Injectable 5000 Unit(s) SubCutaneous every 8 hours  insulin lispro (HumaLOG) corrective regimen sliding scale   SubCutaneous every 6 hours  insulin NPH human recombinant 10 Unit(s) SubCutaneous <User Schedule>  nystatin Powder 1 Application(s) Topical two times a day      PRN Meds:  chlorhexidine 0.12% Liquid 15 milliLiter(s) Swish and Spit every 4 hours PRN  dextrose Gel 1 Dose(s) Oral once PRN  glucagon  Injectable 1 milliGRAM(s) IntraMuscular once PRN  hydrocortisone 1% Ointment 1 Application(s) Topical every 8 hours PRN  midodrine 10 milliGRAM(s) Oral every other day PRN      LABS:                        7.6    11.5  )-----------( 391      ( 01 Mar 2018 23:51 )             23.7     Hgb Trend: 7.6<--, 7.2<--, 7.4<--, 7.2<--, 7.1<--  03-01    138  |  98  |  34<H>  ----------------------------<  193<H>  3.5   |  27  |  3.27<H>    Ca    8.8      01 Mar 2018 23:51  Phos  3.5     03-01  Mg     2.3     03-01    TPro  7.0  /  Alb  2.7<L>  /  TBili  0.2  /  DBili  x   /  AST  22  /  ALT  <5<L>  /  AlkPhos  312<H>  03-01    Creatinine Trend: 3.27<--, 4.34<--, 3.47<--, 5.09<--, 4.07<--, 2.73<--  PT/INR - ( 01 Mar 2018 23:51 )   PT: 12.0 sec;   INR: 1.11 ratio         PTT - ( 01 Mar 2018 23:51 )  PTT:35.4 sec          MICROBIOLOGY:     Culture - Blood (collected 27 Feb 2018 09:18)  Source: .Blood Blood-Peripheral  Preliminary Report (28 Feb 2018 10:02):    No growth to date.      Culture - Blood (collected 27 Feb 2018 09:18)  Source: .Blood Blood-Venous  Preliminary Report (28 Feb 2018 10:02):    No growth to date.      RADIOLOGY:  [ ] Reviewed and interpreted by me    EKG:

## 2018-03-02 NOTE — PROGRESS NOTE ADULT - SUBJECTIVE AND OBJECTIVE BOX
Follow Up:      Interval History/ROS: now interactive - daughter at bedside states she complains of pruritus    Allergies  doxycycline (Rash)  isoniazid (Rash)  NIFEdipine (Urticaria; Hives)  vitamin E (Short breath; Urticaria; Hives)    ANTIMICROBIALS:  ceFAZolin   IVPB 2000 every 48 hours    OTHER MEDS:  MEDICATIONS  (STANDING):  aspirin  chewable 81 daily  dextrose 50% Injectable 12.5 once  dextrose 50% Injectable 25 once  dextrose 50% Injectable 25 once  dextrose Gel 1 once PRN  epoetin odalis Injectable 4000 <User Schedule>  glucagon  Injectable 1 once PRN  heparin  Injectable 5000 every 8 hours  insulin lispro (HumaLOG) corrective regimen sliding scale  every 6 hours  insulin NPH human recombinant 12 every 6 hours  loratadine 10 daily  midodrine 10 every other day PRN  sodium chloride 3%  Inhalation 3 every 8 hours      Vital Signs Last 24 Hrs  T(C): 37 (02 Mar 2018 15:00), Max: 37.8 (02 Mar 2018 06:00)  T(F): 98.6 (02 Mar 2018 15:00), Max: 100 (02 Mar 2018 06:00)  HR: 84 (02 Mar 2018 17:00) (84 - 106)  BP: 164/70 (02 Mar 2018 17:00) (114/67 - 178/77)  BP(mean): 101 (02 Mar 2018 17:00) (76 - 111)  RR: 18 (02 Mar 2018 17:00) (18 - 26)  SpO2: 100% (02 Mar 2018 17:00) (95% - 100%)    PHYSICAL EXAM:  General: WN/WD NAD, Non-toxic  Neurology: A&Ox3, nonfocal  Respiratory: Clear to auscultation bilaterally  CV: RRR, S1S2, no murmurs, rubs or gallops  Abdominal: Soft, Non-tender, non-distended, normal bowel sounds  Extremities: No edema, + peripheral pulses  Line Sites: Clear  Skin: No rash                        7.6    11.5  )-----------( 391      ( 01 Mar 2018 23:51 )             23.7       03-01    138  |  98  |  34<H>  ----------------------------<  193<H>  3.5   |  27  |  3.27<H>    Ca    8.8      01 Mar 2018 23:51  Phos  3.5     03-01  Mg     2.3     03-01    TPro  7.0  /  Alb  2.7<L>  /  TBili  0.2  /  DBili  x   /  AST  22  /  ALT  <5<L>  /  AlkPhos  312<H>  03-01          MICROBIOLOGY:  .Blood Blood-Venous  02-27-18   No growth to date.  --  --      .Blood Blood-Peripheral  02-24-18   No growth at 5 days.  --  --      .Sputum Sputum trap  02-22-18   Numerous Staphylococcus aureus ***********Note************  This isolate demonstrates inducible  clindamycin resistance.  Clindamycin may still be effective in some patients.  No Normal Respiratory Cate present  --  Staphylococcus aureus      .Blood Blood  02-22-18   Growth in anaerobic bottle: Staphylococcus aureus  ***********Note************  This isolate demonstrates inducible  clindamycin resistance.  Clindamycin may still be effective in some patients.  "Due to technical problems, Proteus sp. will Not be reported as part of  the BCID panel until further notice"  ***Blood Panel PCR results on this specimen are available  approximately 3 hours after the Gram stain result.***  Gram stain, PCR, and/or culture results may not always  correspond due to difference in methodologies.  ************************************************************  This PCR assay was performed using [employer].  The following targets are tested for: Enterococcus,  vancomycin resistant enterococci, Listeria monocytogenes,  coagulase negative staphylococci, S. aureus,  methicillin resistant S. aureus, Streptococcus agalactiae  (Group B), S. pneumoniae, S. pyogenes (Group A),  Acinetobacter baumannii, Enterobacter cloacae, E. coli,  Klebsiella oxytoca, K. pneumoniae, Proteus sp.,  Serratia marcescens, Haemophilus influenzae,  Neisseria meningitidis, Pseudomonas aeruginosa, Candida  albicans, C. glabrata, C krusei, C parapsilosis,  C. tropicalis and the KPC resistance gene.  --  Blood Culture PCR  Staphylococcus aureus      .Blood Blood  02-22-18   No growth at 5 days.  --  --      .Blood Blood-Peripheral  02-19-18   No growth at 5 days.  --  --      .Blood Blood-Peripheral  02-03-18   No growth at 5 days.  --  --      .Sputum Sputum  02-03-18   Normal Respiratory Cate present  --    No polymorphonuclear leukocytes per low power field  No Squamous epithelial cells per low power field  Rare Gram Variable Rods per oil power field      .Blood Blood-Venous  02-03-18   No growth at 5 days.  --  --      .Sputum Sputum  02-02-18   Normal Respiratory Cate present  --    Rare polymorphonuclear leukocytes per low power field  No squamous epithelial cells per low power field  No organisms seen      .Blood Blood-Peripheral  02-01-18   No growth at 5 days.  --  --      .Smear Other, lung biopsy  02-01-18 --  --    polymorphonuclear leukocytes seen per low power field  No organisms seen seen per oil power field  by cytocentrifuge    RADIOLOGY:  < from: Xray Chest 1 View- PORTABLE-Urgent (02.24.18 @ 08:52) >  There is a new right-sided central venous line with its tip overlying the   proximal superior vena cava. The remainder of the life supporting devices   are unchanged in position. The cardiac silhouette is stable in size. No   pneumothorax is identified on this projection. There is persistent right   basilar opacity, atelectasis versus pneumonia.    < end of copied text >      Jas Durand MD; Division of Infectious Disease; Pager: [phone]; nights and weekends: [phone]

## 2018-03-02 NOTE — PROGRESS NOTE ADULT - SUBJECTIVE AND OBJECTIVE BOX
Diabetes Follow up note:  Interval Hx:  67 year old female w/uncontrolled T2DM w/complications here w/Flu c/b PEA arrest and Acute respiratory failure. Pt seen in MICU. Pt remains on tube feeds (now running 24 h/day). BG elevated to 329 at noon. Pt on NPH schedule with hold dose for 6am. Tolerating TF at goal. Breathing comfortably w/daughter at bedside-attentive to patients needs.     Review of Systems:  General: c/o itchiness  GI: Tolerating TFs without any N/V/D/ABD PAIN.  CV: No CP/SOB  ENDO: No S&Sx of hypoglycemia  MEDS:    insulin lispro (HumaLOG) corrective regimen sliding scale   SubCutaneous every 6 hours    ceFAZolin   IVPB 2000 milliGRAM(s) IV Intermittent every 48 hours    Allergies    doxycycline (Rash)  isoniazid (Rash)  NIFEdipine (Urticaria; Hives)  vitamin E (Short breath; Urticaria; Hives)        PE:  General: Female lying in bed NAD.   Vital Signs Last 24 Hrs  T(C): 36.3 (02 Mar 2018 11:00), Max: 37.8 (02 Mar 2018 06:00)  T(F): 97.3 (02 Mar 2018 11:00), Max: 100 (02 Mar 2018 06:00)  HR: 90 (02 Mar 2018 13:30) (88 - 106)  BP: 172/75 (02 Mar 2018 13:30) (91/69 - 177/68)  BP(mean): 108 (02 Mar 2018 13:30) (76 - 108)  RR: 22 (02 Mar 2018 13:30) (18 - 26)  SpO2: 100% (02 Mar 2018 13:30) (56% - 100%)  Neck: R IJ shiley in place  CV: S1, S2. NSR on monitor. No edema  Abd: Soft, NT,ND, NGT in place.   Extremities: Warm  Neuro: Awake. Following commands. Appropriate to situation.     LABS:    POCT Blood Glucose.: 329 mg/dL (03-02-18 @ 12:25)  POCT Blood Glucose.: 236 mg/dL (03-02-18 @ 05:09)  POCT Blood Glucose.: 166 mg/dL (03-01-18 @ 18:15)  POCT Blood Glucose.: 194 mg/dL (03-01-18 @ 11:42)  POCT Blood Glucose.: 136 mg/dL (03-01-18 @ 05:00)  POCT Blood Glucose.: 182 mg/dL (02-28-18 @ 17:47)  POCT Blood Glucose.: 212 mg/dL (02-28-18 @ 12:13)  POCT Blood Glucose.: 260 mg/dL (02-28-18 @ 05:22)  POCT Blood Glucose.: 182 mg/dL (02-27-18 @ 23:51)  POCT Blood Glucose.: 223 mg/dL (02-27-18 @ 17:31)                            7.6    11.5  )-----------( 391      ( 01 Mar 2018 23:51 )             23.7       03-01    138  |  98  |  34<H>  ----------------------------<  193<H>  3.5   |  27  |  3.27<H>    Ca    8.8      01 Mar 2018 23:51  Phos  3.5     03-01  Mg     2.3     03-01    TPro  7.0  /  Alb  2.7<L>  /  TBili  0.2  /  DBili  x   /  AST  22  /  ALT  <5<L>  /  AlkPhos  312<H>  03-01        Hemoglobin A1C, Whole Blood: 8.6 % <H> [4.0 - 5.6] (01-31-18 @ 07:15)  Hemoglobin A1C, Whole Blood: 8.7 % <H> [4.0 - 5.6] (01-31-18 @ 05:51)            Contact number: marcelino [phone] or [phone]

## 2018-03-02 NOTE — PROGRESS NOTE ADULT - ASSESSMENT
ACute hypoxemic  resp failure: clinically improved, and undergoing PS weaning trials  S Aureus bacteremia: septic shock, hypotension now resolved  ESRD  R sided aspiration pneumonia: partial radiographic clearing  S/P CAC with rib fractures and bilateral pneumothoraces  CVA with corical and subcortical infarcts  s/p cardiac arrest    REC    Complete antibiotics per MICU  NG feeds: aspiration precautions  COmplete antibiotics  HD per renal  Aspiration prcautions: may still need PEG - re-assess

## 2018-03-02 NOTE — PROGRESS NOTE ADULT - SUBJECTIVE AND OBJECTIVE BOX
Follow-up Pulm Progress Note  Caleb Mon MD  [phone]    Events noted  Remains extubated with stable resp status and adeq cough today  Afebrile on Ancef  Hemodynamically stable    Vital Signs Last 24 Hrs  T(C): 37.1 (26 Feb 2018 08:00), Max: 37.8 (25 Feb 2018 22:00)  T(F): 98.8 (26 Feb 2018 08:00), Max: 100.1 (25 Feb 2018 22:00)  HR: 87 (26 Feb 2018 10:00) (69 - 90)  BP: 130/58 (26 Feb 2018 10:00) (107/49 - 156/71)  BP(mean): 83 (26 Feb 2018 10:00) (69 - 102)  RR: 23 (26 Feb 2018 10:00) (16 - 84)  SpO2: 100% (26 Feb 2018 10:00) (100% - 100%)                       7.1    5.6   )-----------( 237      ( 25 Feb 2018 22:49 )             21.9       02-25    135  |  96  |  49<H>  ----------------------------<  283<H>  3.9   |  24  |  4.07<H>    Ca    8.6      25 Feb 2018 22:49  Phos  5.6     02-25  Mg     2.4     02-25    TPro  6.3  /  Alb  2.0<L>  /  TBili  0.5  /  DBili  x   /  AST  35  /  ALT  54<H>  /  AlkPhos  342<H>  02-25    CULTURES:    Physical Examination:  PULM: scattered rhonchi  CVS: Regular rate and rhythm, no murmurs, rubs, or gallops  ABD: Soft, non-tender  EXT:  No clubbing, cyanosis, or edema    RADIOLOGY REVIEWED  CXR: Extensive R sided opacities c/w pneumonia    CT chest:    TTE:

## 2018-03-02 NOTE — PROGRESS NOTE ADULT - ASSESSMENT
67F PMH HTN, DMT2, Breast Cancer (diagnosed in Feb 2017) currently on trastuzumab (last dose Jan 20th), originally presented 1/30 with fever found to have influenza subsequently went into PEA arrest x 2 with ROSC, complicated by bilateral pneumothoraces with pneumomediastinum s/p 3 chest tubes, and also new ESRD requiring almost daily HD, readmitted to the MICU for hypoxic respiratory failure likely 2/2 aspiration pneumonia, extubated on 3/1, with stable respiratory status.     # Neuro:  - patient alert and following commands  - Known watershed ischemia of the occiput from low flow state during admission 2/2 flu    # CV:  - currently off pressors  - MAPs > 65  - BPs largely normotensive- hold BP meds for now      # Pulm:  - s/p extubation after aspiration pneumonia  - continue with ancef for total of 4 weeks ( from February 24th- until March 24th)  - ancef scheduling per dialysis schedule (i.e. If schedule T-T-Sa (2-2-3 grams)       # Renal:  - plan for dialysis today  - Patient pending permacath placement by IR once afebrile and cleared MSSA  - Appreciate renal recommendations    # ID: concern for aspiration PNA with blood culture and sputum positive for staph aureus    - last BCX from 2/24 shows clearance of infection  - MSSA bacteremia s/p 5 days of vanc/ zosyn, 3 days of nafcillin, now on Ancef switched from nafcillin as patient may have allergic reaction to naf)- dose ancef with dialysis   - IR for permacath- IR states permacath cannot be placed in MICU if patient has shiley  - echo shows no signs of vegetation    # GI:  - continue feeds via NG, Nepro @ 40cc/hr x 24 hrs to provide 960cc fluids recommended by nutrition  - GI ppx with protonix 40mg daily    # Endocrine:  - Continue tube feeds  - Continue with 10U Lantus and FS q6h with low SSI.    # Heme:  - Will continue to trend H/H  - Currently holding Herceptin    # Skin:  - improvement in pruritus no changes in rash  - continue to monitor     # DVT ppx:  - HSC 5000 units Q8 hours 67F PMH HTN, DMT2, Breast Cancer (diagnosed in Feb 2017) currently on trastuzumab (last dose Jan 20th), originally presented 1/30 with fever found to have influenza subsequently went into PEA arrest x 2 with ROSC, complicated by bilateral pneumothoraces with pneumomediastinum s/p 3 chest tubes, and also new ESRD requiring almost daily HD, readmitted to the MICU for hypoxic respiratory failure likely 2/2 aspiration pneumonia, extubated on 3/1, with stable respiratory status.     # Neuro:  - patient alert and following commands  - Known watershed ischemia of the occiput from low flow state during admission 2/2 flu    # CV:  - currently off pressors  - MAPs > 65  - BPs largely normotensive- hold BP meds for now      # Pulm:  - s/p extubation after aspiration pneumonia  - continue with ancef for total of 4 weeks ( from February 24th- until March 24th)  - ancef scheduling per dialysis schedule (i.e. If schedule T-T-Sa (2-2-3 grams)       # Renal:  - plan for dialysis today  - Patient pending permacath placement by IR once afebrile and cleared MSSA  - Appreciate renal recommendations    # ID: concern for aspiration PNA with blood culture and sputum positive for staph aureus    - last BCX from 2/24 shows clearance of infection  - MSSA bacteremia s/p 5 days of vanc/ zosyn, 3 days of nafcillin, now on Ancef switched from nafcillin as patient may have allergic reaction to naf)- dose ancef with dialysis   - IR for permacath- IR states permacath cannot be placed in MICU if patient has shiley  - echo shows no signs of vegetation    # GI:  - continue feeds via NG, Nepro @ 40cc/hr x 24 hrs to provide 960cc fluids recommended by nutrition  - GI ppx with protonix 40mg daily    # Endocrine:  - Continue tube feeds  - Continue with 12 units NPH q 6 hours per endo    # Heme:  - Will continue to trend H/H  - Currently holding Herceptin    # Skin:  - improvement in pruritus no changes in rash  - will start loratadine and calamine lotion  - continue to monitor     # DVT ppx:  - HSC 5000 units Q8 hours

## 2018-03-02 NOTE — PROGRESS NOTE ADULT - ATTENDING COMMENTS
Remains extubated, alert.  Large amt of airway secretions  1.  ARF--remains HD requiring.  Permcath when able  2.  Respiratory failure--improved.  Optimize filling pressures with UF;MICU team working on improving suction requirements  3.  Anemia--DAVID

## 2018-03-02 NOTE — PROGRESS NOTE ADULT - ASSESSMENT
66 y/o F with T2DM uncontrolled on insulin, osteoporosis, HTN, here with acute respiratory failure 2/2 influenza s/p PEA arrest x 2, now extubated on continuous tube feeds at goal w/hyperglycemia. Will recommend increasing NPH and changing regimen to q6h in lieu of receiving tube feeds w/out interruption. BG goal (140-180mg/dl).

## 2018-03-02 NOTE — PROGRESS NOTE ADULT - ASSESSMENT
clinically improved  no resp distress  MSSA bacteremia -cleared since 2/22    If follow up cultures remain negative, continue Cefazolin through 3/22/18  remove Mediport unless needed,    Please call ID if needed over weekend

## 2018-03-02 NOTE — PROGRESS NOTE ADULT - PROBLEM SELECTOR PLAN 1
from ATN in setting of cardiac arrest. Pt continues to be oligo-anuric, requiring dialysis. No evidence of renal recovery at present. Last HD was done yesterday. No plan for HD today. Monitor BMP daily. Patient will need tunneled HD catheter placement once infection is resolved.

## 2018-03-03 LAB
ALBUMIN SERPL ELPH-MCNC: 2.6 G/DL — LOW (ref 3.3–5)
ALP SERPL-CCNC: 323 U/L — HIGH (ref 40–120)
ALT FLD-CCNC: <5 U/L RC — LOW (ref 10–45)
ANION GAP SERPL CALC-SCNC: 13 MMOL/L — SIGNIFICANT CHANGE UP (ref 5–17)
APTT BLD: 30.4 SEC — SIGNIFICANT CHANGE UP (ref 27.5–37.4)
AST SERPL-CCNC: 22 U/L — SIGNIFICANT CHANGE UP (ref 10–40)
BILIRUB SERPL-MCNC: 0.2 MG/DL — SIGNIFICANT CHANGE UP (ref 0.2–1.2)
BUN SERPL-MCNC: 54 MG/DL — HIGH (ref 7–23)
CALCIUM SERPL-MCNC: 9.2 MG/DL — SIGNIFICANT CHANGE UP (ref 8.4–10.5)
CHLORIDE SERPL-SCNC: 101 MMOL/L — SIGNIFICANT CHANGE UP (ref 96–108)
CO2 SERPL-SCNC: 27 MMOL/L — SIGNIFICANT CHANGE UP (ref 22–31)
CREAT SERPL-MCNC: 4.36 MG/DL — HIGH (ref 0.5–1.3)
GLUCOSE BLDC GLUCOMTR-MCNC: 197 MG/DL — HIGH (ref 70–99)
GLUCOSE BLDC GLUCOMTR-MCNC: 201 MG/DL — HIGH (ref 70–99)
GLUCOSE BLDC GLUCOMTR-MCNC: 229 MG/DL — HIGH (ref 70–99)
GLUCOSE BLDC GLUCOMTR-MCNC: 281 MG/DL — HIGH (ref 70–99)
GLUCOSE SERPL-MCNC: 224 MG/DL — HIGH (ref 70–99)
INR BLD: 1 RATIO — SIGNIFICANT CHANGE UP (ref 0.88–1.16)
MAGNESIUM SERPL-MCNC: 2.6 MG/DL — SIGNIFICANT CHANGE UP (ref 1.6–2.6)
PHOSPHATE SERPL-MCNC: 5 MG/DL — HIGH (ref 2.5–4.5)
POTASSIUM SERPL-MCNC: 3.4 MMOL/L — LOW (ref 3.5–5.3)
POTASSIUM SERPL-SCNC: 3.4 MMOL/L — LOW (ref 3.5–5.3)
PROT SERPL-MCNC: 7.1 G/DL — SIGNIFICANT CHANGE UP (ref 6–8.3)
PROTHROM AB SERPL-ACNC: 10.9 SEC — SIGNIFICANT CHANGE UP (ref 9.8–12.7)
SODIUM SERPL-SCNC: 141 MMOL/L — SIGNIFICANT CHANGE UP (ref 135–145)

## 2018-03-03 PROCEDURE — 99233 SBSQ HOSP IP/OBS HIGH 50: CPT | Mod: GC

## 2018-03-03 PROCEDURE — 90935 HEMODIALYSIS ONE EVALUATION: CPT | Mod: GC

## 2018-03-03 RX ORDER — IPRATROPIUM/ALBUTEROL SULFATE 18-103MCG
3 AEROSOL WITH ADAPTER (GRAM) INHALATION EVERY 8 HOURS
Qty: 0 | Refills: 0 | Status: DISCONTINUED | OUTPATIENT
Start: 2018-03-03 | End: 2018-03-11

## 2018-03-03 RX ORDER — POTASSIUM CHLORIDE 20 MEQ
10 PACKET (EA) ORAL ONCE
Qty: 0 | Refills: 0 | Status: COMPLETED | OUTPATIENT
Start: 2018-03-03 | End: 2018-03-03

## 2018-03-03 RX ORDER — SODIUM CHLORIDE 9 MG/ML
3 INJECTION INTRAMUSCULAR; INTRAVENOUS; SUBCUTANEOUS THREE TIMES A DAY
Qty: 0 | Refills: 0 | Status: DISCONTINUED | OUTPATIENT
Start: 2018-03-03 | End: 2018-03-11

## 2018-03-03 RX ORDER — HUMAN INSULIN 100 [IU]/ML
15 INJECTION, SUSPENSION SUBCUTANEOUS EVERY 6 HOURS
Qty: 0 | Refills: 0 | Status: DISCONTINUED | OUTPATIENT
Start: 2018-03-03 | End: 2018-03-04

## 2018-03-03 RX ADMIN — Medication 3 MILLILITER(S): at 21:59

## 2018-03-03 RX ADMIN — HUMAN INSULIN 15 UNIT(S): 100 INJECTION, SUSPENSION SUBCUTANEOUS at 18:05

## 2018-03-03 RX ADMIN — NYSTATIN CREAM 1 APPLICATION(S): 100000 CREAM TOPICAL at 06:24

## 2018-03-03 RX ADMIN — HEPARIN SODIUM 5000 UNIT(S): 5000 INJECTION INTRAVENOUS; SUBCUTANEOUS at 22:26

## 2018-03-03 RX ADMIN — Medication 81 MILLIGRAM(S): at 12:21

## 2018-03-03 RX ADMIN — NYSTATIN CREAM 1 APPLICATION(S): 100000 CREAM TOPICAL at 18:08

## 2018-03-03 RX ADMIN — CALAMINE 8% AND ZINC OXIDE 8% 1 APPLICATION(S): 160 LOTION TOPICAL at 12:15

## 2018-03-03 RX ADMIN — Medication 4: at 00:37

## 2018-03-03 RX ADMIN — SODIUM CHLORIDE 3 MILLILITER(S): 9 INJECTION INTRAMUSCULAR; INTRAVENOUS; SUBCUTANEOUS at 05:11

## 2018-03-03 RX ADMIN — LORATADINE 10 MILLIGRAM(S): 10 TABLET ORAL at 12:21

## 2018-03-03 RX ADMIN — Medication 100 MILLIGRAM(S): at 17:00

## 2018-03-03 RX ADMIN — Medication 4: at 18:05

## 2018-03-03 RX ADMIN — HEPARIN SODIUM 5000 UNIT(S): 5000 INJECTION INTRAVENOUS; SUBCUTANEOUS at 06:23

## 2018-03-03 RX ADMIN — SODIUM CHLORIDE 3 MILLILITER(S): 9 INJECTION INTRAMUSCULAR; INTRAVENOUS; SUBCUTANEOUS at 22:09

## 2018-03-03 RX ADMIN — HUMAN INSULIN 12 UNIT(S): 100 INJECTION, SUSPENSION SUBCUTANEOUS at 00:38

## 2018-03-03 RX ADMIN — HUMAN INSULIN 12 UNIT(S): 100 INJECTION, SUSPENSION SUBCUTANEOUS at 06:23

## 2018-03-03 RX ADMIN — ERYTHROPOIETIN 4000 UNIT(S): 10000 INJECTION, SOLUTION INTRAVENOUS; SUBCUTANEOUS at 11:55

## 2018-03-03 RX ADMIN — Medication 6: at 06:23

## 2018-03-03 RX ADMIN — HUMAN INSULIN 12 UNIT(S): 100 INJECTION, SUSPENSION SUBCUTANEOUS at 12:15

## 2018-03-03 RX ADMIN — Medication 100 MILLIEQUIVALENT(S): at 03:46

## 2018-03-03 RX ADMIN — Medication 4: at 12:15

## 2018-03-03 RX ADMIN — HEPARIN SODIUM 5000 UNIT(S): 5000 INJECTION INTRAVENOUS; SUBCUTANEOUS at 14:15

## 2018-03-03 NOTE — PROGRESS NOTE ADULT - SUBJECTIVE AND OBJECTIVE BOX
Follow-up Pulm Progress Note  Caleb Mon MD  [phone]    Events noted  Remains extubated with stable resp status and adeq cough today  Afebrile on Ancef for MSSA bacteremia, cleared  Hemodynamically stable      Vital Signs Last 24 Hrs  T(C): 37.1 (03 Mar 2018 11:00), Max: 37.8 (02 Mar 2018 20:00)  T(F): 98.8 (03 Mar 2018 11:00), Max: 100 (02 Mar 2018 20:00)  HR: 93 (03 Mar 2018 11:00) (78 - 98)  BP: 147/64 (03 Mar 2018 11:00) (121/58 - 178/77)  BP(mean): 92 (03 Mar 2018 11:00) (76 - 125)  RR: 20 (03 Mar 2018 11:00) (16 - 24)  SpO2: 100% (03 Mar 2018 11:00) (98% - 100%)                       7.5    8.6   )-----------( 400      ( 02 Mar 2018 23:38 )             23.5       03-02    141  |  101  |  54<H>  ----------------------------<  224<H>  3.4<L>   |  27  |  4.36<H>    Ca    9.2      02 Mar 2018 23:38  Phos  5.0     03-02  Mg     2.6     03-02    TPro  7.1  /  Alb  2.6<L>  /  TBili  0.2  /  DBili  x   /  AST  22  /  ALT  <5<L>  /  AlkPhos  323<H>  03-02    CULTURES:    Physical Examination:  PULM: scattered rhonchi  CVS: Regular rate and rhythm, no murmurs, rubs, or gallops  ABD: Soft, non-tender  EXT:  No clubbing, cyanosis, or edema    RADIOLOGY REVIEWED  CXR: Extensive R sided opacities c/w pneumonia    CT chest:    TTE:

## 2018-03-03 NOTE — PROGRESS NOTE ADULT - ATTENDING COMMENTS
I was present during and reviewed clinical and lab data as well as assessment and plan as documented . Please contact if any additional questions with any change in clinical condition or on availability of any additional information or reports.

## 2018-03-03 NOTE — PROGRESS NOTE ADULT - SUBJECTIVE AND OBJECTIVE BOX
CHIEF COMPLAINT:Patient is a 68y old  Female who presents with a chief complaint of Fever, total body weakness (02 Feb 2018 13:44)    Interval Events:  No acute events overnight.      REVIEW OF SYSTEMS:  Constitutional: [ ] negative [ ] fevers [ ] chills [ ] weight loss [ ] weight gain  HEENT: [ ] negative [ ] dry eyes [ ] eye irritation [ ] postnasal drip [ ] nasal congestion  CV: [ ] negative  [ ] chest pain [ ] orthopnea [ ] palpitations [ ] murmur  Resp: [ ] negative [ ] cough [ ] shortness of breath [ ] dyspnea [ ] wheezing [ ] sputum [ ] hemoptysis  GI: [ ] negative [ ] nausea [ ] vomiting [ ] diarrhea [ ] constipation [ ] abd pain [ ] dysphagia   : [ ] negative [ ] dysuria [ ] nocturia [ ] hematuria [ ] increased urinary frequency  Musculoskeletal: [ ] negative [ ] back pain [ ] myalgias [ ] arthralgias [ ] fracture  Skin: [ ] negative [ ] rash [ ] itch  Neurological: [ ] negative [ ] headache [ ] dizziness [ ] syncope [ ] weakness [ ] numbness  Psychiatric: [ ] negative [ ] anxiety [ ] depression  Endocrine: [ ] negative [ ] diabetes [ ] thyroid problem  Hematologic/Lymphatic: [ ] negative [ ] anemia [ ] bleeding problem  Allergic/Immunologic: [ ] negative [ ] itchy eyes [ ] nasal discharge [ ] hives [ ] angioedema  [ ] All other systems negative  [ ] Unable to assess ROS because ________    OBJECTIVE:  ICU Vital Signs Last 24 Hrs  T(C): 36.6 (03 Mar 2018 07:30), Max: 37.8 (02 Mar 2018 20:00)  T(F): 97.8 (03 Mar 2018 07:30), Max: 100 (02 Mar 2018 20:00)  HR: 92 (03 Mar 2018 07:30) (78 - 100)  BP: 158/69 (03 Mar 2018 07:30) (121/58 - 178/77)  BP(mean): 99 (03 Mar 2018 07:30) (76 - 125)  ABP: --  ABP(mean): --  RR: 18 (03 Mar 2018 07:30) (16 - 24)  SpO2: 100% (03 Mar 2018 07:30) (95% - 100%)        03-02 @ 07:01  -  03-03 @ 07:00  --------------------------------------------------------  IN: 1340 mL / OUT: 0 mL / NET: 1340 mL      CAPILLARY BLOOD GLUCOSE  246 (02 Mar 2018 18:00)      POCT Blood Glucose.: 281 mg/dL (03 Mar 2018 06:07)      PHYSICAL EXAM:  General: WN/WD NAD  Neurology: A&Ox3, nonfocal, CENTENO x 4  Neck:  Supple, no sinuses or palpable masses  Lymphatic:  No palpable cervical, supraclavicular, axillary or inguinal adenopathy  Respiratory: CTA B/L  CV: RRR, S1S2, no murmur  Abdominal: Soft, NT, ND no palpable mass  MSK: No edema, + peripheral pulses, FROM all 4 extremity  Incisions: intact, no erythema or drainage      LINES:    HOSPITAL MEDICATIONS:  Standing Meds:  aspirin  chewable 81 milliGRAM(s) Oral daily  calamine Lotion 1 Application(s) Topical daily  ceFAZolin   IVPB 2000 milliGRAM(s) IV Intermittent every 48 hours  dextrose 5%. 1000 milliLiter(s) IV Continuous <Continuous>  dextrose 5%. 1000 milliLiter(s) IV Continuous <Continuous>  dextrose 5%. 1000 milliLiter(s) IV Continuous <Continuous>  dextrose 50% Injectable 12.5 Gram(s) IV Push once  dextrose 50% Injectable 25 Gram(s) IV Push once  dextrose 50% Injectable 25 Gram(s) IV Push once  epoetin odalis Injectable 4000 Unit(s) IV Push <User Schedule>  heparin  Injectable 5000 Unit(s) SubCutaneous every 8 hours  insulin lispro (HumaLOG) corrective regimen sliding scale   SubCutaneous every 6 hours  insulin NPH human recombinant 12 Unit(s) SubCutaneous every 6 hours  loratadine 10 milliGRAM(s) Oral daily  nystatin Powder 1 Application(s) Topical two times a day  sodium chloride 3%  Inhalation 3 milliLiter(s) Inhalation every 8 hours      PRN Meds:  chlorhexidine 0.12% Liquid 15 milliLiter(s) Swish and Spit every 4 hours PRN  dextrose Gel 1 Dose(s) Oral once PRN  glucagon  Injectable 1 milliGRAM(s) IntraMuscular once PRN  hydrocortisone 1% Ointment 1 Application(s) Topical every 8 hours PRN  midodrine 10 milliGRAM(s) Oral every other day PRN      LABS:                        7.5    8.6   )-----------( 400      ( 02 Mar 2018 23:38 )             23.5     Hgb Trend: 7.5<--, 7.6<--, 7.2<--, 7.4<--, 7.2<--  03-02    141  |  101  |  54<H>  ----------------------------<  224<H>  3.4<L>   |  27  |  4.36<H>    Ca    9.2      02 Mar 2018 23:38  Phos  5.0     03-02  Mg     2.6     03-02    TPro  7.1  /  Alb  2.6<L>  /  TBili  0.2  /  DBili  x   /  AST  22  /  ALT  <5<L>  /  AlkPhos  323<H>  03-02    Creatinine Trend: 4.36<--, 3.27<--, 4.34<--, 3.47<--, 5.09<--, 4.07<--  PT/INR - ( 02 Mar 2018 23:38 )   PT: 10.9 sec;   INR: 1.00 ratio         PTT - ( 02 Mar 2018 23:38 )  PTT:30.4 sec          MICROBIOLOGY:     RADIOLOGY:  [ ] Reviewed and interpreted by me    EKG:

## 2018-03-03 NOTE — PROGRESS NOTE ADULT - ASSESSMENT
# Neuro:  - patient alert and following commands  - Known watershed ischemia of the occiput from low flow state during admission 2/2 flu    # CV:  - currently off pressors  - MAPs > 65  - BPs largely normotensive- hold BP meds for now      # Pulm:  - s/p extubation after aspiration pneumonia  - continue with ancef for total of 4 weeks ( from February 24th- until March 24th)  - ancef scheduling per dialysis schedule (i.e. If schedule T-T-Sa (2-2-3 grams)       # Renal:  - plan for dialysis today per renal  - Patient pending permacath placement by IR once afebrile and cleared MSSA  - Appreciate renal recommendations  - c/w epo    # ID: concern for aspiration PNA with blood culture and sputum positive for staph aureus    - last BCX from 2/24 shows clearance of infection  - MSSA bacteremia s/p 5 days of vanc/ zosyn, 3 days of nafcillin, now on Ancef switched from nafcillin as patient may have allergic reaction to naf)- dose ancef with dialysis   - IR for permacath- IR states permacath cannot be placed in MICU if patient has shiley  - echo shows no signs of vegetation    # GI:  - continue feeds via NG, Nepro @ 40cc/hr x 24 hrs to provide 960cc fluids recommended by nutrition  - GI ppx with protonix 40mg daily    # Endocrine:  - Continue tube feeds  - Continue with 12 units NPH q 6 hours per endo    # Heme:  - Will continue to trend H/H  - Currently holding Herceptin    # Skin:  - improvement in pruritus no changes in rash  - will start loratadine and calamine lotion  - continue to monitor     # DVT ppx:  - HSC 5000 units Q8 hours 68F PMH HTN, DMT2, Breast Cancer (diagnosed in Feb 2017) currently on trastuzumab (last dose Jan 20th), originally presented 1/30 with fever found to have influenza subsequently went into PEA arrest x 2 with ROSC, complicated by bilateral pneumothoraces with pneumomediastinum s/p 3 chest tubes, and also new ESRD requiring almost daily HD, readmitted to the MICU for hypoxic respiratory failure likely 2/2 aspiration pneumonia, extubated on 3/1, with stable respiratory status, requiring frequent suctioning    # Neuro:  - patient alert and following commands  - Known watershed ischemia of the occiput from low flow state during admission 2/2 flu    # CV:  - currently off pressors  - MAPs > 65  - BPs largely normotensive- hold BP meds for now      # Pulm:  - s/p extubation after aspiration pneumonia  - continue with ancef for total of 4 weeks ( from February 24th- until March 24th)  - ancef scheduling per dialysis schedule (i.e. If schedule T-T-Sa (2-2-3 grams)       # Renal:  - plan for dialysis today per renal  - Patient pending permacath placement by IR once afebrile and cleared MSSA  - Appreciate renal recommendations  - c/w epo    # ID: concern for aspiration PNA with blood culture and sputum positive for staph aureus    - last BCX from 2/24 shows clearance of infection  - MSSA bacteremia s/p 5 days of vanc/ zosyn, 3 days of nafcillin, now on Ancef switched from nafcillin as patient may have allergic reaction to naf)- dose ancef with dialysis   - IR for permacath- IR states permacath cannot be placed in MICU if patient has shiley  - echo shows no signs of vegetation    # GI:  - continue feeds via NG, Nepro @ 40cc/hr x 24 hrs to provide 960cc fluids recommended by nutrition  - GI ppx with protonix 40mg daily    # Endocrine:  - Continue tube feeds  - Continue with 12 units NPH q 6 hours per endo    # Heme:  - Will continue to trend H/H  - Currently holding Herceptin    # Skin:  - improvement in pruritus no changes in rash  - will start loratadine and calamine lotion  - continue to monitor     # DVT ppx:  - HSC 5000 units Q8 hours

## 2018-03-03 NOTE — PROGRESS NOTE ADULT - ATTENDING COMMENTS
68 year old woman pneumonia had required intubation has been extubated but remains with copious thick secretions needing frequent suctioning    sputum culture positive for MSSA continue on Ancef  ESRD on HD  supplemental oxygen as needed    will transfer to [facility] when bed available

## 2018-03-03 NOTE — PROGRESS NOTE ADULT - SUBJECTIVE AND OBJECTIVE BOX
Chief Complaint: f/u DM2    History:  At bedside, patient is awake and alert. She is able to nod her head yes/no to questions. Denies chest pain, palpitations. No abdominal pain, nausea, vomiting. She is receiving HD at bedside. On tube feeds at 40 cc/hr.    MEDICATIONS  (STANDING):  aspirin  chewable 81 milliGRAM(s) Oral daily  calamine Lotion 1 Application(s) Topical daily  ceFAZolin   IVPB 2000 milliGRAM(s) IV Intermittent every 48 hours  dextrose 5%. 1000 milliLiter(s) (50 mL/Hr) IV Continuous <Continuous>  dextrose 5%. 1000 milliLiter(s) (50 mL/Hr) IV Continuous <Continuous>  dextrose 5%. 1000 milliLiter(s) (50 mL/Hr) IV Continuous <Continuous>  dextrose 50% Injectable 12.5 Gram(s) IV Push once  dextrose 50% Injectable 25 Gram(s) IV Push once  dextrose 50% Injectable 25 Gram(s) IV Push once  epoetin odalis Injectable 4000 Unit(s) IV Push <User Schedule>  heparin  Injectable 5000 Unit(s) SubCutaneous every 8 hours  insulin lispro (HumaLOG) corrective regimen sliding scale   SubCutaneous every 6 hours  insulin NPH human recombinant 12 Unit(s) SubCutaneous every 6 hours  loratadine 10 milliGRAM(s) Oral daily  nystatin Powder 1 Application(s) Topical two times a day  sodium chloride 3%  Inhalation 3 milliLiter(s) Inhalation every 8 hours    MEDICATIONS  (PRN):  chlorhexidine 0.12% Liquid 15 milliLiter(s) Swish and Spit every 4 hours PRN mouth hygiene  dextrose Gel 1 Dose(s) Oral once PRN Blood Glucose LESS THAN 70 milliGRAM(s)/deciliter  glucagon  Injectable 1 milliGRAM(s) IntraMuscular once PRN Glucose LESS THAN 70 milligrams/deciliter  hydrocortisone 1% Ointment 1 Application(s) Topical every 8 hours PRN Rash and/or Itching  midodrine 10 milliGRAM(s) Oral every other day PRN prior to dialysis      Allergies    doxycycline (Rash)  isoniazid (Rash)  NIFEdipine (Urticaria; Hives)  vitamin E (Short breath; Urticaria; Hives)    Review of Systems:  Constitutional: No fever  Cardiovascular: No chest pain, palpitations  Respiratory: SOB improved  GI: No nausea, vomiting, abdominal pain    ALL OTHER SYSTEMS REVIEWED AND NEGATIVE    PHYSICAL EXAM:  VITALS: T(C): 37.2 (03-03-18 @ 11:15)  T(F): 99 (03-03-18 @ 11:15), Max: 100 (03-02-18 @ 20:00)  HR: 96 (03-03-18 @ 11:15) (78 - 98)  BP: 150/68 (03-03-18 @ 11:15) (121/58 - 178/77)  RR:  (16 - 24)  SpO2:  (98% - 100%)  Wt(kg): --  GENERAL: NAD, well-developed  RESPIRATORY: Clear to auscultation bilaterally; No rales, rhonchi, wheezing, or rubs  CARDIOVASCULAR: Regular rate and rhythm; No murmurs; no peripheral edema  GI: Soft, nontender, non distended  SKIN: Dry, intact, No rashes or lesions noted on feet    POCT Blood Glucose.: 201 mg/dL (03-03-18 @ 12:16) NPH 12, H 4  POCT Blood Glucose.: 281 mg/dL (03-03-18 @ 06:07) NPH 12, H 6  POCT Blood Glucose.: 246 mg/dL (03-02-18 @ 18:09) NPH 12, H 4  POCT Blood Glucose.: 329 mg/dL (03-02-18 @ 12:25)  POCT Blood Glucose.: 236 mg/dL (03-02-18 @ 05:09)  POCT Blood Glucose.: 166 mg/dL (03-01-18 @ 18:15)  POCT Blood Glucose.: 194 mg/dL (03-01-18 @ 11:42)  POCT Blood Glucose.: 136 mg/dL (03-01-18 @ 05:00)  POCT Blood Glucose.: 182 mg/dL (02-28-18 @ 17:47)      03-02    141  |  101  |  54<H>  ----------------------------<  224<H>  3.4<L>   |  27  |  4.36<H>    EGFR if : 11<L>  EGFR if non : 10<L>    Ca    9.2      03-02  Mg     2.6     03-02  Phos  5.0     03-02    TPro  7.1  /  Alb  2.6<L>  /  TBili  0.2  /  DBili  x   /  AST  22  /  ALT  <5<L>  /  AlkPhos  323<H>  03-02        Hemoglobin A1C, Whole Blood: 8.6 % <H> [4.0 - 5.6] (01-31-18 @ 07:15)  Hemoglobin A1C, Whole Blood: 8.7 % <H> [4.0 - 5.6] (01-31-18 @ 05:51)

## 2018-03-03 NOTE — PROGRESS NOTE ADULT - SUBJECTIVE AND OBJECTIVE BOX
[facility] Division of Kidney Diseases & Hypertension  FOLLOW UP NOTE  [phone]--------------------------------------------------------------------------------  Chief Complaint:Cardiac arrest      24 hour events/subjective:      Currently on face mask and tolerating it well  On hemodialysis    PAST HISTORY  --------------------------------------------------------------------------------  No significant changes to PMH, PSH, FHx, SHx, unless otherwise noted    ALLERGIES & MEDICATIONS  --------------------------------------------------------------------------------  Allergies    doxycycline (Rash)  isoniazid (Rash)  NIFEdipine (Urticaria; Hives)  vitamin E (Short breath; Urticaria; Hives)    Intolerances      Standing Inpatient Medications  aspirin  chewable 81 milliGRAM(s) Oral daily  ceFAZolin   IVPB 2000 milliGRAM(s) IV Intermittent every 48 hours  dextrose 5%. 1000 milliLiter(s) IV Continuous <Continuous>  dextrose 5%. 1000 milliLiter(s) IV Continuous <Continuous>  dextrose 5%. 1000 milliLiter(s) IV Continuous <Continuous>  dextrose 50% Injectable 12.5 Gram(s) IV Push once  dextrose 50% Injectable 25 Gram(s) IV Push once  dextrose 50% Injectable 25 Gram(s) IV Push once  epoetin odalis Injectable 4000 Unit(s) IV Push <User Schedule>  heparin  Injectable 5000 Unit(s) SubCutaneous every 8 hours  insulin lispro (HumaLOG) corrective regimen sliding scale   SubCutaneous every 6 hours  insulin NPH human recombinant 10 Unit(s) SubCutaneous <User Schedule>  loratadine 10 milliGRAM(s) Oral daily  nystatin Powder 1 Application(s) Topical two times a day    PRN Inpatient Medications  chlorhexidine 0.12% Liquid 15 milliLiter(s) Swish and Spit every 4 hours PRN  dextrose Gel 1 Dose(s) Oral once PRN  glucagon  Injectable 1 milliGRAM(s) IntraMuscular once PRN  hydrocortisone 1% Ointment 1 Application(s) Topical every 8 hours PRN  midodrine 10 milliGRAM(s) Oral every other day PRN      REVIEW OF SYSTEMS  --------------------------------------------------------------------------------  Gen: No  fevers/chills  Skin: No rashes  Head/Eyes/Ears/Mouth: No headache; Normal hearing; Normal vision w/o blurriness  Respiratory: No dyspnea, cough, wheezing, hemoptysis  CV: No chest pain, PND, orthopnea  GI: No abdominal pain, diarrhea, constipation, nausea, vomiting  : No increased frequency, dysuria, hematuria, nocturia  MSK: No joint pain/swelling; no back pain; no edema  Neuro: No dizziness/lightheadedness, weakness, seizures, numbness, tingling      All other systems were reviewed and are negative, except as noted.    VITALS/PHYSICAL EXAM  Vital Signs Last 24 Hrs  T(C): 37.8 (03-03-18 @ 20:00)  T(F): 100.1 (03-03-18 @ 20:00), Max: 100.1 (03-03-18 @ 20:00)  HR: 96 (03-03-18 @ 22:01) (80 - 108)  BP: 144/64 (03-03-18 @ 21:00)  BP(mean): 92 (03-03-18 @ 21:00) (66 - 116)  RR: 29 (03-03-18 @ 21:00) (16 - 29)  SpO2: 100% (03-03-18 @ 22:01) (100% - 100%)  Wt(kg): --    03-02 @ 07:01  -  03-03 @ 07:00  --------------------------------------------------------  IN: 1340 mL / OUT: 0 mL / NET: 1340 mL    03-03 @ 07:01  -  03-04 @ 00:50  --------------------------------------------------------  IN: 1710 mL / OUT: 2000 mL / NET: -290 mL      --------------------------------------------------------------------------------  T(C): 36.7 (03-02-18 @ 07:00), Max: 37.8 (03-02-18 @ 06:00)  HR: 100 (03-02-18 @ 09:00) (87 - 106)  BP: 159/66 (03-02-18 @ 09:00) (91/69 - 171/86)  RR: 24 (03-02-18 @ 09:00) (16 - 30)  SpO2: 96% (03-02-18 @ 09:00) (56% - 100%)  Wt(kg): --        03-01-18 @ 07:01  -  03-02-18 @ 07:00  --------------------------------------------------------  IN: 790 mL / OUT: 1700 mL / NET: -910 mL    03-02-18 @ 07:01  -  03-02-18 @ 09:48  --------------------------------------------------------  IN: 120 mL / OUT: 0 mL / NET: 120 mL      Physical Exam:  	  GEn- NAD, on face mask   Chest- vent sounds  Heart- S1/S2 rrr  Abdomen- soft, nd  Extremities- no edema  Vascular access: Right IJ non tunneled HD catheter present.       LABS/STUDIES  --------------------------------------------------------------------------------              7.2    9.9   >-----------<  345      [03-04-18 @ 00:22]              22.9     141  |  101  |  54  ----------------------------<  224      [03-02-18 @ 23:38]  3.4   |  27  |  4.36        Ca     9.2     [03-02-18 @ 23:38]      Mg     2.6     [03-02-18 @ 23:38]      Phos  5.0     [03-02-18 @ 23:38]    TPro  7.1  /  Alb  2.6  /  TBili  0.2  /  DBili  x   /  AST  22  /  ALT  <5  /  AlkPhos  323  [03-02-18 @ 23:38]    PT/INR: PT 10.9 , INR 1.00       [03-02-18 @ 23:38]  PTT: 30.4       [03-02-18 @ 23:38]      Creatinine Trend:  SCr 4.36 [03-02 @ 23:38]  SCr 3.27 [03-01 @ 23:51]  SCr 4.34 [02-28 @ 22:56]  SCr 3.47 [02-28 @ 00:17]  SCr 5.09 [02-26 @ 23:45]                    LABS/STUDIES  --------------------------------------------------------------------------------              7.6    11.5  >-----------<  391      [03-01-18 @ 23:51]              23.7     138  |  98  |  34  ----------------------------<  193      [03-01-18 @ 23:51]  3.5   |  27  |  3.27        Ca     8.8     [03-01-18 @ 23:51]      Mg     2.3     [03-01-18 @ 23:51]      Phos  3.5     [03-01-18 @ 23:51]    TPro  7.0  /  Alb  2.7  /  TBili  0.2  /  DBili  x   /  AST  22  /  ALT  <5  /  AlkPhos  312  [03-01-18 @ 23:51]    PT/INR: PT 12.0 , INR 1.11       [03-01-18 @ 23:51]  PTT: 35.4       [03-01-18 @ 23:51]      Creatinine Trend:  SCr 3.27 [03-01 @ 23:51]  SCr 4.34 [02-28 @ 22:56]  SCr 3.47 [02-28 @ 00:17]  SCr 5.09 [02-26 @ 23:45]  SCr 4.07 [02-25 @ 22:49]          HBsAb <3.0      [02-28-18 @ 20:23]  HBsAg Nonreact      [02-28-18 @ 20:23]  HCV 0.24, Nonreact      [02-28-18 @ 20:23]

## 2018-03-03 NOTE — PROGRESS NOTE ADULT - PROBLEM SELECTOR PLAN 1
-test BG Q6h  -Increase NPH to 15 units Q6h (hold NPH if tube feeds off)  -c/w Humalog moderate correction scale q6h.

## 2018-03-03 NOTE — PROGRESS NOTE ADULT - ASSESSMENT
I have seen the patient and reviewed dialysis prescription and flowsheet. Dialysis access is functioning well. Patient is tolerating dialysis well with no acute symptoms or distress. Dialysis prescription has been adjusted for optimized control of volume status, uremia and electrolytes. Management of additional metabolic abnormalities/anemia will continue to be addressed on follow up.

## 2018-03-03 NOTE — PROGRESS NOTE ADULT - SUBJECTIVE AND OBJECTIVE BOX
[facility] Division of Kidney Diseases & Hypertension  FOLLOW UP NOTE  [phone]--------------------------------------------------------------------------------  Chief Complaint:Cardiac arrest      24 hour events/subjective:    No acute events noted.    PAST HISTORY  --------------------------------------------------------------------------------  No significant changes to PMH, PSH, FHx, SHx, unless otherwise noted    ALLERGIES & MEDICATIONS  --------------------------------------------------------------------------------  Allergies    doxycycline (Rash)  isoniazid (Rash)  NIFEdipine (Urticaria; Hives)  vitamin E (Short breath; Urticaria; Hives)    Intolerances      Standing Inpatient Medications  aspirin  chewable 81 milliGRAM(s) Oral daily  calamine Lotion 1 Application(s) Topical daily  ceFAZolin   IVPB 2000 milliGRAM(s) IV Intermittent every 48 hours  dextrose 5%. 1000 milliLiter(s) IV Continuous <Continuous>  dextrose 5%. 1000 milliLiter(s) IV Continuous <Continuous>  dextrose 5%. 1000 milliLiter(s) IV Continuous <Continuous>  dextrose 50% Injectable 12.5 Gram(s) IV Push once  dextrose 50% Injectable 25 Gram(s) IV Push once  dextrose 50% Injectable 25 Gram(s) IV Push once  epoetin odalis Injectable 4000 Unit(s) IV Push <User Schedule>  heparin  Injectable 5000 Unit(s) SubCutaneous every 8 hours  insulin lispro (HumaLOG) corrective regimen sliding scale   SubCutaneous every 6 hours  insulin NPH human recombinant 15 Unit(s) SubCutaneous every 6 hours  loratadine 10 milliGRAM(s) Oral daily  nystatin Powder 1 Application(s) Topical two times a day    PRN Inpatient Medications  chlorhexidine 0.12% Liquid 15 milliLiter(s) Swish and Spit every 4 hours PRN  dextrose Gel 1 Dose(s) Oral once PRN  glucagon  Injectable 1 milliGRAM(s) IntraMuscular once PRN  hydrocortisone 1% Ointment 1 Application(s) Topical every 8 hours PRN  midodrine 10 milliGRAM(s) Oral every other day PRN      REVIEW OF SYSTEMS  --------------------------------------------------------------------------------  Gen: No  fevers/chills  Skin: No rashes  Head/Eyes/Ears/Mouth: No headache; Normal hearing; Normal vision w/o blurriness  Respiratory: No dyspnea, cough, wheezing, hemoptysis  CV: No chest pain, PND, orthopnea  GI: No abdominal pain, diarrhea, constipation, nausea, vomiting  : No increased frequency, dysuria, hematuria, nocturia  MSK: No joint pain/swelling; no back pain; no edema  Neuro: No dizziness/lightheadedness, weakness, seizures, numbness, tingling      All other systems were reviewed and are negative, except as noted.    VITALS/PHYSICAL EXAM  --------------------------------------------------------------------------------  T(C): 37.4 (03-03-18 @ 14:30), Max: 37.8 (03-02-18 @ 20:00)  HR: 103 (03-03-18 @ 14:30) (78 - 105)  BP: 143/64 (03-03-18 @ 14:30) (99/46 - 178/77)  RR: 20 (03-03-18 @ 14:30) (16 - 24)  SpO2: 100% (03-03-18 @ 14:30) (98% - 100%)  Wt(kg): --        03-02-18 @ 07:01  -  03-03-18 @ 07:00  --------------------------------------------------------  IN: 1340 mL / OUT: 0 mL / NET: 1340 mL    03-03-18 @ 07:01  -  03-03-18 @ 15:04  --------------------------------------------------------  IN: 1420 mL / OUT: 2000 mL / NET: -580 mL      Physical Exam:  	  GEn- NAD, on face mask   Chest- vent sounds  Heart- S1/S2 rrr  Abdomen- soft, nd  Extremities- no edema  Vascular access: Right IJ non tunneled HD catheter present.     LABS/STUDIES  --------------------------------------------------------------------------------              7.5    8.6   >-----------<  400      [03-02-18 @ 23:38]              23.5     141  |  101  |  54  ----------------------------<  224      [03-02-18 @ 23:38]  3.4   |  27  |  4.36        Ca     9.2     [03-02-18 @ 23:38]      Mg     2.6     [03-02-18 @ 23:38]      Phos  5.0     [03-02-18 @ 23:38]    TPro  7.1  /  Alb  2.6  /  TBili  0.2  /  DBili  x   /  AST  22  /  ALT  <5  /  AlkPhos  323  [03-02-18 @ 23:38]    PT/INR: PT 10.9 , INR 1.00       [03-02-18 @ 23:38]  PTT: 30.4       [03-02-18 @ 23:38]      Creatinine Trend:  SCr 4.36 [03-02 @ 23:38]  SCr 3.27 [03-01 @ 23:51]  SCr 4.34 [02-28 @ 22:56]  SCr 3.47 [02-28 @ 00:17]  SCr 5.09 [02-26 @ 23:45]          HBsAb <3.0      [02-28-18 @ 20:23]  HBsAg Nonreact      [02-28-18 @ 20:23]  HCV 0.24, Nonreact      [02-28-18 @ 20:23]

## 2018-03-03 NOTE — PROGRESS NOTE ADULT - ASSESSMENT
ACute hypoxemic  resp failure: clinically improved, and undergoing PS weaning trials  S Aureus bacteremia: septic shock, hypotension now resolved  ESRD  R sided aspiration pneumonia: partial radiographic clearing  S/P CAC with rib fractures and bilateral pneumothoraces  CVA with corical and subcortical infarcts  s/p cardiac arrest    REC    Complete antibiotics per MICU/ID  NG feeds: aspiration precautions  HD per renal  Aspiration prcautions: may still need PEG - re-assess

## 2018-03-03 NOTE — PROGRESS NOTE ADULT - ASSESSMENT
68 y/o F with T2DM uncontrolled on insulin, osteoporosis, HTN, here with acute respiratory failure 2/2 influenza s/p PEA arrest x 2, now extubated on continuous tube feeds at goal w/hyperglycemia. Will recommend increasing NPH and changing regimen to q6h in lieu of receiving tube feeds w/out interruption. BG goal (140-180mg/dl).

## 2018-03-03 NOTE — PROGRESS NOTE ADULT - PROBLEM SELECTOR PLAN 1
from [facility] in setting of cardiac arrest. Pt continues to be oligo-anuric, requiring dialysis. No evidence of renal recovery at present. Last HD was done on 3/1. Will arrange for maintenance HD today. Monitor BMP daily. Patient will need tunneled HD catheter placement once infection is resolved.

## 2018-03-04 DIAGNOSIS — N19 UNSPECIFIED KIDNEY FAILURE: ICD-10-CM

## 2018-03-04 LAB
ALBUMIN SERPL ELPH-MCNC: 2.4 G/DL — LOW (ref 3.3–5)
ALP SERPL-CCNC: 326 U/L — HIGH (ref 40–120)
ALT FLD-CCNC: <5 U/L RC — LOW (ref 10–45)
ANION GAP SERPL CALC-SCNC: 10 MMOL/L — SIGNIFICANT CHANGE UP (ref 5–17)
APTT BLD: 33.8 SEC — SIGNIFICANT CHANGE UP (ref 27.5–37.4)
AST SERPL-CCNC: 29 U/L — SIGNIFICANT CHANGE UP (ref 10–40)
BASOPHILS # BLD AUTO: 0 K/UL — SIGNIFICANT CHANGE UP (ref 0–0.2)
BASOPHILS NFR BLD AUTO: 0.5 % — SIGNIFICANT CHANGE UP (ref 0–2)
BILIRUB SERPL-MCNC: 0.2 MG/DL — SIGNIFICANT CHANGE UP (ref 0.2–1.2)
BUN SERPL-MCNC: 36 MG/DL — HIGH (ref 7–23)
CALCIUM SERPL-MCNC: 8.3 MG/DL — LOW (ref 8.4–10.5)
CHLORIDE SERPL-SCNC: 98 MMOL/L — SIGNIFICANT CHANGE UP (ref 96–108)
CO2 SERPL-SCNC: 28 MMOL/L — SIGNIFICANT CHANGE UP (ref 22–31)
CREAT SERPL-MCNC: 3.12 MG/DL — HIGH (ref 0.5–1.3)
CULTURE RESULTS: SIGNIFICANT CHANGE UP
CULTURE RESULTS: SIGNIFICANT CHANGE UP
EOSINOPHIL # BLD AUTO: 0.1 K/UL — SIGNIFICANT CHANGE UP (ref 0–0.5)
EOSINOPHIL NFR BLD AUTO: 0.6 % — SIGNIFICANT CHANGE UP (ref 0–6)
GLUCOSE BLDC GLUCOMTR-MCNC: 117 MG/DL — HIGH (ref 70–99)
GLUCOSE BLDC GLUCOMTR-MCNC: 206 MG/DL — HIGH (ref 70–99)
GLUCOSE BLDC GLUCOMTR-MCNC: 239 MG/DL — HIGH (ref 70–99)
GLUCOSE BLDC GLUCOMTR-MCNC: 315 MG/DL — HIGH (ref 70–99)
GLUCOSE SERPL-MCNC: 199 MG/DL — HIGH (ref 70–99)
HCT VFR BLD CALC: 22.9 % — LOW (ref 34.5–45)
HGB BLD-MCNC: 7.2 G/DL — LOW (ref 11.5–15.5)
INR BLD: 1.04 RATIO — SIGNIFICANT CHANGE UP (ref 0.88–1.16)
LYMPHOCYTES # BLD AUTO: 1.1 K/UL — SIGNIFICANT CHANGE UP (ref 1–3.3)
LYMPHOCYTES # BLD AUTO: 11.1 % — LOW (ref 13–44)
MAGNESIUM SERPL-MCNC: 2.3 MG/DL — SIGNIFICANT CHANGE UP (ref 1.6–2.6)
MCHC RBC-ENTMCNC: 31.2 PG — SIGNIFICANT CHANGE UP (ref 27–34)
MCHC RBC-ENTMCNC: 31.5 GM/DL — LOW (ref 32–36)
MCV RBC AUTO: 99 FL — SIGNIFICANT CHANGE UP (ref 80–100)
MONOCYTES # BLD AUTO: 1.2 K/UL — HIGH (ref 0–0.9)
MONOCYTES NFR BLD AUTO: 12.5 % — SIGNIFICANT CHANGE UP (ref 2–14)
NEUTROPHILS # BLD AUTO: 7.4 K/UL — SIGNIFICANT CHANGE UP (ref 1.8–7.4)
NEUTROPHILS NFR BLD AUTO: 75.4 % — SIGNIFICANT CHANGE UP (ref 43–77)
PHOSPHATE SERPL-MCNC: 2.9 MG/DL — SIGNIFICANT CHANGE UP (ref 2.5–4.5)
PLATELET # BLD AUTO: 345 K/UL — SIGNIFICANT CHANGE UP (ref 150–400)
POTASSIUM SERPL-MCNC: 3.7 MMOL/L — SIGNIFICANT CHANGE UP (ref 3.5–5.3)
POTASSIUM SERPL-SCNC: 3.7 MMOL/L — SIGNIFICANT CHANGE UP (ref 3.5–5.3)
PROT SERPL-MCNC: 6.7 G/DL — SIGNIFICANT CHANGE UP (ref 6–8.3)
PROTHROM AB SERPL-ACNC: 11.2 SEC — SIGNIFICANT CHANGE UP (ref 9.8–12.7)
RBC # BLD: 2.31 M/UL — LOW (ref 3.8–5.2)
RBC # FLD: 17.1 % — HIGH (ref 10.3–14.5)
SODIUM SERPL-SCNC: 136 MMOL/L — SIGNIFICANT CHANGE UP (ref 135–145)
SPECIMEN SOURCE: SIGNIFICANT CHANGE UP
SPECIMEN SOURCE: SIGNIFICANT CHANGE UP
WBC # BLD: 9.9 K/UL — SIGNIFICANT CHANGE UP (ref 3.8–10.5)
WBC # FLD AUTO: 9.9 K/UL — SIGNIFICANT CHANGE UP (ref 3.8–10.5)

## 2018-03-04 PROCEDURE — 99233 SBSQ HOSP IP/OBS HIGH 50: CPT | Mod: GC

## 2018-03-04 PROCEDURE — 99232 SBSQ HOSP IP/OBS MODERATE 35: CPT | Mod: GC

## 2018-03-04 RX ORDER — ERYTHROPOIETIN 10000 [IU]/ML
10000 INJECTION, SOLUTION INTRAVENOUS; SUBCUTANEOUS
Qty: 0 | Refills: 0 | Status: DISCONTINUED | OUTPATIENT
Start: 2018-03-04 | End: 2018-03-13

## 2018-03-04 RX ORDER — HYDRALAZINE HCL 50 MG
10 TABLET ORAL ONCE
Qty: 0 | Refills: 0 | Status: COMPLETED | OUTPATIENT
Start: 2018-03-04 | End: 2018-03-04

## 2018-03-04 RX ORDER — CEFAZOLIN SODIUM 1 G
3000 VIAL (EA) INJECTION
Qty: 0 | Refills: 0 | Status: DISCONTINUED | OUTPATIENT
Start: 2018-03-04 | End: 2018-03-05

## 2018-03-04 RX ORDER — HUMAN INSULIN 100 [IU]/ML
15 INJECTION, SUSPENSION SUBCUTANEOUS EVERY 6 HOURS
Qty: 0 | Refills: 0 | Status: DISCONTINUED | OUTPATIENT
Start: 2018-03-04 | End: 2018-03-05

## 2018-03-04 RX ORDER — HUMAN INSULIN 100 [IU]/ML
15 INJECTION, SUSPENSION SUBCUTANEOUS EVERY 6 HOURS
Qty: 0 | Refills: 0 | Status: DISCONTINUED | OUTPATIENT
Start: 2018-03-04 | End: 2018-03-04

## 2018-03-04 RX ORDER — CEFAZOLIN SODIUM 1 G
2000 VIAL (EA) INJECTION
Qty: 0 | Refills: 0 | Status: DISCONTINUED | OUTPATIENT
Start: 2018-03-04 | End: 2018-03-05

## 2018-03-04 RX ADMIN — Medication 4: at 17:30

## 2018-03-04 RX ADMIN — HEPARIN SODIUM 5000 UNIT(S): 5000 INJECTION INTRAVENOUS; SUBCUTANEOUS at 21:25

## 2018-03-04 RX ADMIN — Medication 3 MILLILITER(S): at 13:48

## 2018-03-04 RX ADMIN — HUMAN INSULIN 15 UNIT(S): 100 INJECTION, SUSPENSION SUBCUTANEOUS at 06:03

## 2018-03-04 RX ADMIN — Medication 4: at 06:03

## 2018-03-04 RX ADMIN — Medication 10 MILLIGRAM(S): at 17:31

## 2018-03-04 RX ADMIN — NYSTATIN CREAM 1 APPLICATION(S): 100000 CREAM TOPICAL at 17:31

## 2018-03-04 RX ADMIN — Medication 3 MILLILITER(S): at 05:49

## 2018-03-04 RX ADMIN — HUMAN INSULIN 15 UNIT(S): 100 INJECTION, SUSPENSION SUBCUTANEOUS at 00:00

## 2018-03-04 RX ADMIN — NYSTATIN CREAM 1 APPLICATION(S): 100000 CREAM TOPICAL at 06:03

## 2018-03-04 RX ADMIN — Medication 81 MILLIGRAM(S): at 11:21

## 2018-03-04 RX ADMIN — SODIUM CHLORIDE 3 MILLILITER(S): 9 INJECTION INTRAMUSCULAR; INTRAVENOUS; SUBCUTANEOUS at 05:48

## 2018-03-04 RX ADMIN — Medication 3 MILLILITER(S): at 21:25

## 2018-03-04 RX ADMIN — HEPARIN SODIUM 5000 UNIT(S): 5000 INJECTION INTRAVENOUS; SUBCUTANEOUS at 06:04

## 2018-03-04 RX ADMIN — SODIUM CHLORIDE 3 MILLILITER(S): 9 INJECTION INTRAMUSCULAR; INTRAVENOUS; SUBCUTANEOUS at 21:26

## 2018-03-04 RX ADMIN — SODIUM CHLORIDE 3 MILLILITER(S): 9 INJECTION INTRAMUSCULAR; INTRAVENOUS; SUBCUTANEOUS at 13:49

## 2018-03-04 RX ADMIN — Medication 10 MILLIGRAM(S): at 22:45

## 2018-03-04 RX ADMIN — HUMAN INSULIN 15 UNIT(S): 100 INJECTION, SUSPENSION SUBCUTANEOUS at 17:30

## 2018-03-04 RX ADMIN — HUMAN INSULIN 15 UNIT(S): 100 INJECTION, SUSPENSION SUBCUTANEOUS at 11:22

## 2018-03-04 RX ADMIN — CALAMINE 8% AND ZINC OXIDE 8% 1 APPLICATION(S): 160 LOTION TOPICAL at 11:21

## 2018-03-04 RX ADMIN — HEPARIN SODIUM 5000 UNIT(S): 5000 INJECTION INTRAVENOUS; SUBCUTANEOUS at 14:11

## 2018-03-04 NOTE — PROGRESS NOTE ADULT - ATTENDING COMMENTS
68 year old woman with aspiration pneumonia with respiratory failure had been intubated since been extubated and is now on supplemental oxygen via nasal cannula,  remains with copious thick secretions needing frequent suctioning    sputum culture positive for MSSA continue on Ancef  ESRD on HD  supplemental oxygen as needed    will transfer to [facility] when bed available

## 2018-03-04 NOTE — PROGRESS NOTE ADULT - PROBLEM SELECTOR PLAN 1
from [facility] in setting of cardiac arrest. Pt continues to be oligo-anuric, requiring dialysis. No evidence of renal recovery at present. Last HD was done on 3/3. No plan for HD today. Monitor BMP daily. HD catheter noted to have poor blood flow yesterday. Recommend placement of tunneled HD catheter once infection is resolved.

## 2018-03-04 NOTE — PROGRESS NOTE ADULT - ASSESSMENT
68F PMH HTN, DMT2, Breast Cancer (diagnosed in Feb 2017) currently on trastuzumab (last dose Jan 20th), originally presented 1/30 with fever found to have influenza subsequently went into PEA arrest x 2 with ROSC, complicated by bilateral pneumothoraces with pneumomediastinum s/p 3 chest tubes, and also new ESRD requiring almost daily HD, readmitted to the MICU for hypoxic respiratory failure likely 2/2 aspiration pneumonia, extubated on 3/1, with stable respiratory status, requiring frequent suctioning    # Neuro:  - patient alert and following commands  - Known watershed ischemia of the occiput from low flow state during admission 2/2 flu    # CV:  - currently off pressors  - MAPs > 65  - patient hypertensive over 24 hours- at home on carvedilol and ramipril  - can start _____today      # Pulm:  - s/p extubation after aspiration pneumonia  - continue with ancef for total of 4 weeks for MSSA bacteremia ( from February 24th- until March 24th)  - ancef scheduling per dialysis schedule (i.e. If schedule T-T-Sa (2-2-3 grams)       # Renal:  - s/p dialysis on 3/3  - Patient pending permacath placement by IR once out of the ICU  - Appreciate renal recommendations    # ID: concern for aspiration PNA with blood culture and sputum positive for staph aureus    - last BCX from 2/24 shows clearance of infection  - MSSA bacteremia s/p 5 days of vanc/ zosyn, 3 days of nafcillin, now on Ancef switched from nafcillin as patient may have allergic reaction to naf)- dose ancef with dialysis (total 4 weeks) until March 24th  - IR for permacath- IR states permacath cannot be placed in MICU if patient has shiley  - echo shows no signs of vegetation    # GI:  - continue feeds via NG, Nepro @ 40cc/hr x 24 hrs to provide 960cc fluids recommended by nutrition  - GI ppx with protonix 40mg daily    # Endocrine:  - Continue tube feeds  - Continue with 12 units NPH q 6 hours per endo    # Heme:  - Will continue to trend H/H  - Currently holding Herceptin    # Skin:  - improvement in pruritus no changes in rash  - c/w loratidine and calamine lotion  - derm consult if no improvement by monday  - continue to monitor     # DVT ppx:  - HSC 5000 units Q8 hours 68F PMH HTN, DMT2, Breast Cancer (diagnosed in Feb 2017) currently on trastuzumab (last dose Jan 20th), originally presented 1/30 with fever found to have influenza subsequently went into PEA arrest x 2 with ROSC, complicated by bilateral pneumothoraces with pneumomediastinum s/p 3 chest tubes, and also new ESRD requiring almost daily HD, readmitted to the MICU for hypoxic respiratory failure likely 2/2 aspiration pneumonia, extubated on 3/1, with stable respiratory status, requiring frequent suctioning    # Neuro:  - patient alert and following commands  - Known watershed ischemia of the occiput from low flow state during admission 2/2 flu    # CV:  - currently off pressors  - MAPs > 65  - BPs acceptable at this time  - can start antihypertensives if needed (at home on carvedilol and ramipril)        # Pulm:  - s/p extubation after aspiration pneumonia  - continue with ancef for total of 4 weeks for MSSA bacteremia ( from February 24th- until March 24th)  - marked pulmonary secretions- c/w inhaled 3% saline with duonebs, start use of acapella and incentive spirometry      # Renal:  - s/p dialysis on 3/3  - Patient pending permacath placement by IR once out of the ICU  - Appreciate renal recommendations    # ID: concern for aspiration PNA with blood culture and sputum positive for staph aureus    - last BCX from 2/24 shows clearance of infection  - MSSA bacteremia s/p 5 days of vanc/ zosyn, 3 days of nafcillin, now on Ancef switched from nafcillin as patient may have allergic reaction to naf)- dose ancef with dialysis (total 4 weeks) until March 24th (- ancef scheduling per dialysis schedule (i.e. If schedule T-T-Sa (2-2-3 grams) )  - IR for permacath- IR states permacath cannot be placed in MICU if patient has shiley  - echo shows no signs of vegetation    # GI:  - continue feeds via NG, Nepro @ 40cc/hr x 24 hrs to provide 960cc fluids recommended by nutrition  - GI ppx with protonix 40mg daily    # Endocrine:  - Continue tube feeds  - Continue with 12 units NPH q 6 hours per endo    # Heme:  - Will continue to trend H/H  - Currently holding Herceptin    # Skin:  - improvement in pruritus no changes in rash  - c/w loratadine and calamine lotion  - continue to monitor     # DVT ppx:  - HSC 5000 units Q8 hours

## 2018-03-04 NOTE — PROGRESS NOTE ADULT - SUBJECTIVE AND OBJECTIVE BOX
CHIEF COMPLAINT:Patient is a 68y old  Female who presents with a chief complaint of Fever, total body weakness (02 Feb 2018 13:44)    Interval Events:  Patient seen and examined at bedside. No acute events overnight.      REVIEW OF SYSTEMS:  Constitutional: [ X] negative [ ] fevers [ ] chills [ ] weight loss [ ] weight gain  HEENT: [ X] negative [ ] dry eyes [ ] eye irritation [ ] postnasal drip [ ] nasal congestion  CV: [X ] negative  [ ] chest pain [ ] orthopnea [ ] palpitations [ ] murmur  Resp: [ X] negative [ ] cough [ ] shortness of breath [ ] dyspnea [ ] wheezing [ ] sputum [ ] hemoptysis  GI: [X ] negative [ ] nausea [ ] vomiting [ ] diarrhea [ ] constipation [ ] abd pain [ ] dysphagia   : [X ] negative [ ] dysuria [ ] nocturia [ ] hematuria [ ] increased urinary frequency  Musculoskeletal: [ ] negative [ ] back pain [ ] myalgias [ ] arthralgias [ ] fracture  Skin: [ ] negative [ ] rash [X ] itch  Neurological: [X ] negative [ ] headache [ ] dizziness [ ] syncope [ ] weakness [ ] numbness  Psychiatric: [ ] negative [ ] anxiety [ ] depression  Endocrine: [ ] negative [ ] diabetes [ ] thyroid problem  Hematologic/Lymphatic: [ ] negative [ ] anemia [ ] bleeding problem  Allergic/Immunologic: [ ] negative [ ] itchy eyes [ ] nasal discharge [ ] hives [ ] angioedema  [ ] All other systems negative  [ ] Unable to assess ROS because ________    OBJECTIVE:  ICU Vital Signs Last 24 Hrs  T(C): 37.2 (04 Mar 2018 04:00), Max: 37.8 (03 Mar 2018 20:00)  T(F): 99 (04 Mar 2018 04:00), Max: 100.1 (03 Mar 2018 20:00)  HR: 89 (04 Mar 2018 07:00) (76 - 108)  BP: 167/73 (04 Mar 2018 07:00) (99/46 - 170/71)  BP(mean): 105 (04 Mar 2018 07:00) (66 - 109)  ABP: --  ABP(mean): --  RR: 20 (04 Mar 2018 07:00) (16 - 29)  SpO2: 100% (04 Mar 2018 07:00) (100% - 100%)        03-03 @ 07:01  -  03-04 @ 07:00  --------------------------------------------------------  IN: 2190 mL / OUT: 2000 mL / NET: 190 mL      CAPILLARY BLOOD GLUCOSE  229 (03 Mar 2018 18:00)      POCT Blood Glucose.: 206 mg/dL (04 Mar 2018 05:54)      PHYSICAL EXAM:  General: drowsy female, laying in bed, NAD  Neurology: A&Ox3,  ENT:  Mucosa moist, no ulcerations  Neck:  Supple, no sinuses or palpable masses  Respiratory: CTA B/L  CV: RRR, S1S2, no murmur  Abdominal: Soft, NT, ND no palpable mass  MSK: No edema, + peripheral pulses,  Skin: excoriations on upper extremities- macular rash with decrease erythema on upper and lower extermities    LINES:    HOSPITAL MEDICATIONS:  Standing Meds:  ALBUTerol/ipratropium for Nebulization 3 milliLiter(s) Nebulizer every 8 hours  aspirin  chewable 81 milliGRAM(s) Oral daily  calamine Lotion 1 Application(s) Topical daily  ceFAZolin   IVPB 2000 milliGRAM(s) IV Intermittent every 48 hours  dextrose 5%. 1000 milliLiter(s) IV Continuous <Continuous>  dextrose 5%. 1000 milliLiter(s) IV Continuous <Continuous>  dextrose 5%. 1000 milliLiter(s) IV Continuous <Continuous>  dextrose 50% Injectable 12.5 Gram(s) IV Push once  dextrose 50% Injectable 25 Gram(s) IV Push once  dextrose 50% Injectable 25 Gram(s) IV Push once  epoetin odalis Injectable 4000 Unit(s) IV Push <User Schedule>  heparin  Injectable 5000 Unit(s) SubCutaneous every 8 hours  insulin lispro (HumaLOG) corrective regimen sliding scale   SubCutaneous every 6 hours  insulin NPH human recombinant 15 Unit(s) SubCutaneous every 6 hours  loratadine 10 milliGRAM(s) Oral daily  nystatin Powder 1 Application(s) Topical two times a day  sodium chloride 3%  Inhalation 3 milliLiter(s) Inhalation three times a day      PRN Meds:  chlorhexidine 0.12% Liquid 15 milliLiter(s) Swish and Spit every 4 hours PRN  dextrose Gel 1 Dose(s) Oral once PRN  glucagon  Injectable 1 milliGRAM(s) IntraMuscular once PRN  hydrocortisone 1% Ointment 1 Application(s) Topical every 8 hours PRN  midodrine 10 milliGRAM(s) Oral every other day PRN      LABS:                        7.2    9.9   )-----------( 345      ( 04 Mar 2018 00:22 )             22.9     Hgb Trend: 7.2<--, 7.5<--, 7.6<--, 7.2<--, 7.4<--  03-04    136  |  98  |  36<H>  ----------------------------<  199<H>  3.7   |  28  |  3.12<H>    Ca    8.3<L>      04 Mar 2018 00:22  Phos  2.9     03-04  Mg     2.3     03-04    TPro  6.7  /  Alb  2.4<L>  /  TBili  0.2  /  DBili  x   /  AST  29  /  ALT  <5<L>  /  AlkPhos  326<H>  03-04    Creatinine Trend: 3.12<--, 4.36<--, 3.27<--, 4.34<--, 3.47<--, 5.09<--  PT/INR - ( 04 Mar 2018 00:22 )   PT: 11.2 sec;   INR: 1.04 ratio         PTT - ( 04 Mar 2018 00:22 )  PTT:33.8 sec          MICROBIOLOGY:     RADIOLOGY:  [ ] Reviewed and interpreted by me    EKG:

## 2018-03-04 NOTE — PROGRESS NOTE ADULT - SUBJECTIVE AND OBJECTIVE BOX
[facility] Division of Kidney Diseases & Hypertension  FOLLOW UP NOTE  [phone]--------------------------------------------------------------------------------  Chief Complaint:Cardiac arrest      24 hour events/subjective:    No acute events noted  Had HD done yesterday; noted to have poor blood flow thru HD catheter.     PAST HISTORY  --------------------------------------------------------------------------------  No significant changes to PMH, PSH, FHx, SHx, unless otherwise noted    ALLERGIES & MEDICATIONS  --------------------------------------------------------------------------------  Allergies    doxycycline (Rash)  isoniazid (Rash)  NIFEdipine (Urticaria; Hives)  vitamin E (Short breath; Urticaria; Hives)    Intolerances      Standing Inpatient Medications  ALBUTerol/ipratropium for Nebulization 3 milliLiter(s) Nebulizer every 8 hours  aspirin  chewable 81 milliGRAM(s) Oral daily  calamine Lotion 1 Application(s) Topical daily  ceFAZolin   IVPB 2000 milliGRAM(s) IV Intermittent <User Schedule>  ceFAZolin   IVPB 3000 milliGRAM(s) IV Intermittent <User Schedule>  dextrose 5%. 1000 milliLiter(s) IV Continuous <Continuous>  dextrose 5%. 1000 milliLiter(s) IV Continuous <Continuous>  dextrose 5%. 1000 milliLiter(s) IV Continuous <Continuous>  dextrose 50% Injectable 12.5 Gram(s) IV Push once  dextrose 50% Injectable 25 Gram(s) IV Push once  dextrose 50% Injectable 25 Gram(s) IV Push once  epoetin odalis Injectable 4000 Unit(s) IV Push <User Schedule>  heparin  Injectable 5000 Unit(s) SubCutaneous every 8 hours  insulin lispro (HumaLOG) corrective regimen sliding scale   SubCutaneous every 6 hours  insulin NPH human recombinant 15 Unit(s) SubCutaneous every 6 hours  loratadine 10 milliGRAM(s) Oral daily  nystatin Powder 1 Application(s) Topical two times a day  sodium chloride 3%  Inhalation 3 milliLiter(s) Inhalation three times a day    PRN Inpatient Medications  chlorhexidine 0.12% Liquid 15 milliLiter(s) Swish and Spit every 4 hours PRN  dextrose Gel 1 Dose(s) Oral once PRN  glucagon  Injectable 1 milliGRAM(s) IntraMuscular once PRN  hydrocortisone 1% Ointment 1 Application(s) Topical every 8 hours PRN  midodrine 10 milliGRAM(s) Oral every other day PRN      REVIEW OF SYSTEMS  --------------------------------------------------------------------------------  Unable to obtain.     VITALS/PHYSICAL EXAM  --------------------------------------------------------------------------------  T(C): 37.2 (03-04-18 @ 04:00), Max: 37.8 (03-03-18 @ 20:00)  HR: 89 (03-04-18 @ 07:00) (76 - 108)  BP: 167/73 (03-04-18 @ 07:00) (99/46 - 167/73)  RR: 20 (03-04-18 @ 07:00) (16 - 29)  SpO2: 100% (03-04-18 @ 07:00) (100% - 100%)  Wt(kg): --        03-03-18 @ 07:01  -  03-04-18 @ 07:00  --------------------------------------------------------  IN: 2190 mL / OUT: 2000 mL / NET: 190 mL      Physical Exam:  	  GEn- NAD, on face mask   Chest- coarse breath sounds present.   Heart- S1/S2 rrr  Abdomen- soft, nd  Extremities- no edema  Vascular access: Right IJ non tunneled HD catheter present.       LABS/STUDIES  --------------------------------------------------------------------------------              7.2    9.9   >-----------<  345      [03-04-18 @ 00:22]              22.9     136  |  98  |  36  ----------------------------<  199      [03-04-18 @ 00:22]  3.7   |  28  |  3.12        Ca     8.3     [03-04-18 @ 00:22]      Mg     2.3     [03-04-18 @ 00:22]      Phos  2.9     [03-04-18 @ 00:22]    TPro  6.7  /  Alb  2.4  /  TBili  0.2  /  DBili  x   /  AST  29  /  ALT  <5  /  AlkPhos  326  [03-04-18 @ 00:22]    PT/INR: PT 11.2 , INR 1.04       [03-04-18 @ 00:22]  PTT: 33.8       [03-04-18 @ 00:22]      Creatinine Trend:  SCr 3.12 [03-04 @ 00:22]  SCr 4.36 [03-02 @ 23:38]  SCr 3.27 [03-01 @ 23:51]  SCr 4.34 [02-28 @ 22:56]  SCr 3.47 [02-28 @ 00:17]          HBsAb <3.0      [02-28-18 @ 20:23]  HBsAg Nonreact      [02-28-18 @ 20:23]  HCV 0.24, Nonreact      [02-28-18 @ 20:23]

## 2018-03-05 LAB
ALBUMIN SERPL ELPH-MCNC: 2.4 G/DL — LOW (ref 3.3–5)
ALP SERPL-CCNC: 325 U/L — HIGH (ref 40–120)
ALT FLD-CCNC: <5 U/L RC — LOW (ref 10–45)
ANION GAP SERPL CALC-SCNC: 13 MMOL/L — SIGNIFICANT CHANGE UP (ref 5–17)
APTT BLD: 27.9 SEC — SIGNIFICANT CHANGE UP (ref 27.5–37.4)
AST SERPL-CCNC: 23 U/L — SIGNIFICANT CHANGE UP (ref 10–40)
BASOPHILS # BLD AUTO: 0 K/UL — SIGNIFICANT CHANGE UP (ref 0–0.2)
BASOPHILS NFR BLD AUTO: 0.4 % — SIGNIFICANT CHANGE UP (ref 0–2)
BILIRUB SERPL-MCNC: 0.2 MG/DL — SIGNIFICANT CHANGE UP (ref 0.2–1.2)
BUN SERPL-MCNC: 55 MG/DL — HIGH (ref 7–23)
CALCIUM SERPL-MCNC: 8.9 MG/DL — SIGNIFICANT CHANGE UP (ref 8.4–10.5)
CHLORIDE SERPL-SCNC: 98 MMOL/L — SIGNIFICANT CHANGE UP (ref 96–108)
CO2 SERPL-SCNC: 27 MMOL/L — SIGNIFICANT CHANGE UP (ref 22–31)
CREAT SERPL-MCNC: 3.98 MG/DL — HIGH (ref 0.5–1.3)
EOSINOPHIL # BLD AUTO: 0 K/UL — SIGNIFICANT CHANGE UP (ref 0–0.5)
EOSINOPHIL NFR BLD AUTO: 0.7 % — SIGNIFICANT CHANGE UP (ref 0–6)
GLUCOSE BLDC GLUCOMTR-MCNC: 132 MG/DL — HIGH (ref 70–99)
GLUCOSE BLDC GLUCOMTR-MCNC: 187 MG/DL — HIGH (ref 70–99)
GLUCOSE BLDC GLUCOMTR-MCNC: 208 MG/DL — HIGH (ref 70–99)
GLUCOSE BLDC GLUCOMTR-MCNC: 229 MG/DL — HIGH (ref 70–99)
GLUCOSE SERPL-MCNC: 309 MG/DL — HIGH (ref 70–99)
HCT VFR BLD CALC: 23 % — LOW (ref 34.5–45)
HGB BLD-MCNC: 7.5 G/DL — LOW (ref 11.5–15.5)
INR BLD: 0.99 RATIO — SIGNIFICANT CHANGE UP (ref 0.88–1.16)
LYMPHOCYTES # BLD AUTO: 0.7 K/UL — LOW (ref 1–3.3)
LYMPHOCYTES # BLD AUTO: 9.6 % — LOW (ref 13–44)
MAGNESIUM SERPL-MCNC: 2.6 MG/DL — SIGNIFICANT CHANGE UP (ref 1.6–2.6)
MCHC RBC-ENTMCNC: 32.2 PG — SIGNIFICANT CHANGE UP (ref 27–34)
MCHC RBC-ENTMCNC: 32.5 GM/DL — SIGNIFICANT CHANGE UP (ref 32–36)
MCV RBC AUTO: 99.2 FL — SIGNIFICANT CHANGE UP (ref 80–100)
MONOCYTES # BLD AUTO: 0.9 K/UL — SIGNIFICANT CHANGE UP (ref 0–0.9)
MONOCYTES NFR BLD AUTO: 12.5 % — SIGNIFICANT CHANGE UP (ref 2–14)
NEUTROPHILS # BLD AUTO: 5.4 K/UL — SIGNIFICANT CHANGE UP (ref 1.8–7.4)
NEUTROPHILS NFR BLD AUTO: 76.9 % — SIGNIFICANT CHANGE UP (ref 43–77)
PHOSPHATE SERPL-MCNC: 4.2 MG/DL — SIGNIFICANT CHANGE UP (ref 2.5–4.5)
PLATELET # BLD AUTO: 330 K/UL — SIGNIFICANT CHANGE UP (ref 150–400)
POTASSIUM SERPL-MCNC: 3.6 MMOL/L — SIGNIFICANT CHANGE UP (ref 3.5–5.3)
POTASSIUM SERPL-SCNC: 3.6 MMOL/L — SIGNIFICANT CHANGE UP (ref 3.5–5.3)
PROT SERPL-MCNC: 6.8 G/DL — SIGNIFICANT CHANGE UP (ref 6–8.3)
PROTHROM AB SERPL-ACNC: 10.7 SEC — SIGNIFICANT CHANGE UP (ref 9.8–12.7)
RBC # BLD: 2.32 M/UL — LOW (ref 3.8–5.2)
RBC # FLD: 17 % — HIGH (ref 10.3–14.5)
SODIUM SERPL-SCNC: 138 MMOL/L — SIGNIFICANT CHANGE UP (ref 135–145)
WBC # BLD: 7.1 K/UL — SIGNIFICANT CHANGE UP (ref 3.8–10.5)
WBC # FLD AUTO: 7.1 K/UL — SIGNIFICANT CHANGE UP (ref 3.8–10.5)

## 2018-03-05 PROCEDURE — 99232 SBSQ HOSP IP/OBS MODERATE 35: CPT

## 2018-03-05 PROCEDURE — 99233 SBSQ HOSP IP/OBS HIGH 50: CPT | Mod: GC

## 2018-03-05 RX ORDER — CARVEDILOL PHOSPHATE 80 MG/1
6.25 CAPSULE, EXTENDED RELEASE ORAL EVERY 12 HOURS
Qty: 0 | Refills: 0 | Status: DISCONTINUED | OUTPATIENT
Start: 2018-03-05 | End: 2018-03-09

## 2018-03-05 RX ORDER — HUMAN INSULIN 100 [IU]/ML
18 INJECTION, SUSPENSION SUBCUTANEOUS EVERY 6 HOURS
Qty: 0 | Refills: 0 | Status: DISCONTINUED | OUTPATIENT
Start: 2018-03-05 | End: 2018-03-05

## 2018-03-05 RX ORDER — HUMAN INSULIN 100 [IU]/ML
15 INJECTION, SUSPENSION SUBCUTANEOUS ONCE
Qty: 0 | Refills: 0 | Status: COMPLETED | OUTPATIENT
Start: 2018-03-06 | End: 2018-03-06

## 2018-03-05 RX ORDER — HUMAN INSULIN 100 [IU]/ML
18 INJECTION, SUSPENSION SUBCUTANEOUS
Qty: 0 | Refills: 0 | Status: DISCONTINUED | OUTPATIENT
Start: 2018-03-05 | End: 2018-03-12

## 2018-03-05 RX ORDER — CEFAZOLIN SODIUM 1 G
2000 VIAL (EA) INJECTION
Qty: 0 | Refills: 0 | Status: DISCONTINUED | OUTPATIENT
Start: 2018-03-05 | End: 2018-03-08

## 2018-03-05 RX ORDER — CEFAZOLIN SODIUM 1 G
3000 VIAL (EA) INJECTION
Qty: 0 | Refills: 0 | Status: DISCONTINUED | OUTPATIENT
Start: 2018-03-05 | End: 2018-03-08

## 2018-03-05 RX ADMIN — HUMAN INSULIN 15 UNIT(S): 100 INJECTION, SUSPENSION SUBCUTANEOUS at 12:41

## 2018-03-05 RX ADMIN — Medication 2: at 18:02

## 2018-03-05 RX ADMIN — HUMAN INSULIN 15 UNIT(S): 100 INJECTION, SUSPENSION SUBCUTANEOUS at 06:23

## 2018-03-05 RX ADMIN — CALAMINE 8% AND ZINC OXIDE 8% 1 APPLICATION(S): 160 LOTION TOPICAL at 12:45

## 2018-03-05 RX ADMIN — Medication 3 MILLILITER(S): at 14:03

## 2018-03-05 RX ADMIN — SODIUM CHLORIDE 3 MILLILITER(S): 9 INJECTION INTRAMUSCULAR; INTRAVENOUS; SUBCUTANEOUS at 22:10

## 2018-03-05 RX ADMIN — NYSTATIN CREAM 1 APPLICATION(S): 100000 CREAM TOPICAL at 18:03

## 2018-03-05 RX ADMIN — HEPARIN SODIUM 5000 UNIT(S): 5000 INJECTION INTRAVENOUS; SUBCUTANEOUS at 14:04

## 2018-03-05 RX ADMIN — HUMAN INSULIN 18 UNIT(S): 100 INJECTION, SUSPENSION SUBCUTANEOUS at 18:03

## 2018-03-05 RX ADMIN — HEPARIN SODIUM 5000 UNIT(S): 5000 INJECTION INTRAVENOUS; SUBCUTANEOUS at 06:22

## 2018-03-05 RX ADMIN — NYSTATIN CREAM 1 APPLICATION(S): 100000 CREAM TOPICAL at 06:23

## 2018-03-05 RX ADMIN — HUMAN INSULIN 15 UNIT(S): 100 INJECTION, SUSPENSION SUBCUTANEOUS at 00:01

## 2018-03-05 RX ADMIN — CARVEDILOL PHOSPHATE 6.25 MILLIGRAM(S): 80 CAPSULE, EXTENDED RELEASE ORAL at 18:02

## 2018-03-05 RX ADMIN — Medication 3 MILLILITER(S): at 06:05

## 2018-03-05 RX ADMIN — HEPARIN SODIUM 5000 UNIT(S): 5000 INJECTION INTRAVENOUS; SUBCUTANEOUS at 22:25

## 2018-03-05 RX ADMIN — Medication 3 MILLILITER(S): at 22:09

## 2018-03-05 RX ADMIN — Medication 81 MILLIGRAM(S): at 12:45

## 2018-03-05 RX ADMIN — Medication 4: at 06:23

## 2018-03-05 RX ADMIN — SODIUM CHLORIDE 3 MILLILITER(S): 9 INJECTION INTRAMUSCULAR; INTRAVENOUS; SUBCUTANEOUS at 14:02

## 2018-03-05 RX ADMIN — Medication 8: at 00:00

## 2018-03-05 RX ADMIN — SODIUM CHLORIDE 3 MILLILITER(S): 9 INJECTION INTRAMUSCULAR; INTRAVENOUS; SUBCUTANEOUS at 06:11

## 2018-03-05 NOTE — PROGRESS NOTE ADULT - SUBJECTIVE AND OBJECTIVE BOX
Follow-up Pulm Progress Note  Caleb Mon MD  [phone]    Events noted  Remains extubated with stable resp status and adeq cough today  Afebrile on Ancef for MSSA bacteremia, cleared  Hemodynamically stable  Ongoing suctioning requirements per MICU team      Vital Signs Last 24 Hrs  T(C): 37.1 (03 Mar 2018 11:00), Max: 37.8 (02 Mar 2018 20:00)  T(F): 98.8 (03 Mar 2018 11:00), Max: 100 (02 Mar 2018 20:00)  HR: 93 (03 Mar 2018 11:00) (78 - 98)  BP: 147/64 (03 Mar 2018 11:00) (121/58 - 178/77)  BP(mean): 92 (03 Mar 2018 11:00) (76 - 125)  RR: 20 (03 Mar 2018 11:00) (16 - 24)  SpO2: 100% (03 Mar 2018 11:00) (98% - 100%)                       7.5    8.6   )-----------( 400      ( 02 Mar 2018 23:38 )             23.5       03-02    141  |  101  |  54<H>  ----------------------------<  224<H>  3.4<L>   |  27  |  4.36<H>    Ca    9.2      02 Mar 2018 23:38  Phos  5.0     03-02  Mg     2.6     03-02    TPro  7.1  /  Alb  2.6<L>  /  TBili  0.2  /  DBili  x   /  AST  22  /  ALT  <5<L>  /  AlkPhos  323<H>  03-02    CULTURES:    Physical Examination:  PULM: scattered rhonchi  CVS: Regular rate and rhythm, no murmurs, rubs, or gallops  ABD: Soft, non-tender  EXT:  No clubbing, cyanosis, or edema    RADIOLOGY REVIEWED  CXR: Extensive R sided opacities c/w pneumonia    CT chest:    TTE:

## 2018-03-05 NOTE — PROGRESS NOTE ADULT - ASSESSMENT
67 y/o F with T2DM uncontrolled on insulin, osteoporosis, HTN, here with acute respiratory failure 2/2 influenza s/p PEA arrest x 2, awaiting RCU placement.

## 2018-03-05 NOTE — CHART NOTE - NSCHARTNOTEFT_GEN_A_CORE
asked by MICU to put in IR perm cath request   pt has R IJ Ashlee cath and L SC Mediport  h/o breast can s/p b/l mastectomy and R LN resection 4/2017    discussed with IR staff, pt needs ID clearance for the procedure   will plan for procedure on wed 3/7, RCU staff to call back to check time/schedule. Request put in the computer     ID also needs to clear pt to resume Herceptin for her breast cancer treatment per MICU team

## 2018-03-05 NOTE — PROGRESS NOTE ADULT - PROBLEM SELECTOR PLAN 1
from ATN in setting of cardiac arrest. Pt continues to be oligo-anuric, requiring dialysis. No evidence of renal recovery at present. Last HD was done on 3/3. No plan for HD today. Monitor BMP daily. Will need placement of tunneled HD catheter by IR.

## 2018-03-05 NOTE — PROGRESS NOTE ADULT - PROBLEM SELECTOR PLAN 1
-Check bs q6  -Continue NPH 15 units sq q6am and 18 units sq at 12am, 12pm and 6pm  -mod scale q6  Patria Weiner MD  [phone]

## 2018-03-05 NOTE — PROGRESS NOTE ADULT - ATTENDING COMMENTS
68 year old woman with aspiration pneumonia with respiratory failure had been intubated now on supplemental oxygen via nasal cannula,  remains with copious thick secretions needing frequent suctioning    sputum culture positive for MSSA continue on Ancef  ESRD on HD  will need pulmonary toilet will try metanebs  continue incentive spirotmetry    will transfer to RCU when bed available

## 2018-03-05 NOTE — PROGRESS NOTE ADULT - ASSESSMENT
ACute hypoxemic  resp failure: clinically improved, and undergoing PS weaning trials  S Aureus bacteremia: septic shock, hypotension now resolved  ESRD  R sided aspiration pneumonia: partial radiographic clearing  S/P CAC with rib fractures and bilateral pneumothoraces  CVA with corical and subcortical infarcts  s/p cardiac arrest    REC    Complete antibiotics per MICU/ID  NG feeds: aspiration precautions  NT suctioning  HD per renal  Aspiration prcautions: may still need PEG - re-assess

## 2018-03-05 NOTE — PROGRESS NOTE ADULT - SUBJECTIVE AND OBJECTIVE BOX
Chief Complaint/Follow-up on: T2DM  Subjective: Patient extubated since I last saw her. Her voice is soft and raspy, so it is not easy to understand her. She was able to identify me by name.     MEDICATIONS  (STANDING):  ALBUTerol/ipratropium for Nebulization 3 milliLiter(s) Nebulizer every 8 hours  aspirin  chewable 81 milliGRAM(s) Oral daily  calamine Lotion 1 Application(s) Topical daily  ceFAZolin   IVPB 2000 milliGRAM(s) IV Intermittent <User Schedule>  ceFAZolin   IVPB 3000 milliGRAM(s) IV Intermittent <User Schedule>  dextrose 5%. 1000 milliLiter(s) (50 mL/Hr) IV Continuous <Continuous>  dextrose 5%. 1000 milliLiter(s) (50 mL/Hr) IV Continuous <Continuous>  dextrose 5%. 1000 milliLiter(s) (50 mL/Hr) IV Continuous <Continuous>  dextrose 50% Injectable 12.5 Gram(s) IV Push once  dextrose 50% Injectable 25 Gram(s) IV Push once  dextrose 50% Injectable 25 Gram(s) IV Push once  epoetin odalis Injectable 70518 Unit(s) IV Push <User Schedule>  heparin  Injectable 5000 Unit(s) SubCutaneous every 8 hours  insulin lispro (HumaLOG) corrective regimen sliding scale   SubCutaneous every 6 hours  insulin NPH human recombinant 18 Unit(s) SubCutaneous every 6 hours  nystatin Powder 1 Application(s) Topical two times a day  sodium chloride 3%  Inhalation 3 milliLiter(s) Inhalation three times a day    MEDICATIONS  (PRN):  chlorhexidine 0.12% Liquid 15 milliLiter(s) Swish and Spit every 4 hours PRN mouth hygiene  dextrose Gel 1 Dose(s) Oral once PRN Blood Glucose LESS THAN 70 milliGRAM(s)/deciliter  glucagon  Injectable 1 milliGRAM(s) IntraMuscular once PRN Glucose LESS THAN 70 milligrams/deciliter  hydrocortisone 1% Ointment 1 Application(s) Topical every 8 hours PRN Rash and/or Itching  midodrine 10 milliGRAM(s) Oral every other day PRN prior to dialysis      PHYSICAL EXAM:  VITALS: T(C): 36.8 (03-05-18 @ 04:00)  T(F): 98.2 (03-05-18 @ 04:00), Max: 98.9 (03-04-18 @ 16:00)  HR: 98 (03-05-18 @ 12:40) (82 - 120)  BP: 143/64 (03-05-18 @ 12:40) (124/56 - 173/76)  RR:  (15 - 27)  SpO2:  (92% - 100%)  Wt(kg): --  GENERAL: NAD, well-groomed, well-developed  HEENT:  Atraumatic, Normocephalic, moist mucous membranes  RESPIRATORY: Clear to auscultation bilaterally; No rales, rhonchi, wheezing, or rubs  CARDIOVASCULAR: Regular rate and rhythm; No murmurs; no peripheral edema  GI: Soft, nontender, non distended, normal bowel sounds      POCT Blood Glucose.: 132 mg/dL (03-05-18 @ 11:53)  POCT Blood Glucose.: 229 mg/dL (03-05-18 @ 06:21)  POCT Blood Glucose.: 315 mg/dL (03-04-18 @ 23:57)  POCT Blood Glucose.: 239 mg/dL (03-04-18 @ 17:20)  POCT Blood Glucose.: 117 mg/dL (03-04-18 @ 11:17)  POCT Blood Glucose.: 206 mg/dL (03-04-18 @ 05:54)  POCT Blood Glucose.: 197 mg/dL (03-03-18 @ 23:53)  POCT Blood Glucose.: 229 mg/dL (03-03-18 @ 18:03)  POCT Blood Glucose.: 201 mg/dL (03-03-18 @ 12:16)  POCT Blood Glucose.: 281 mg/dL (03-03-18 @ 06:07)  POCT Blood Glucose.: 246 mg/dL (03-02-18 @ 18:09)    03-05    138  |  98  |  55<H>  ----------------------------<  309<H>  3.6   |  27  |  3.98<H>    EGFR if : 12<L>  EGFR if non : 11<L>    Ca    8.9      03-05  Mg     2.6     03-05  Phos  4.2     03-05    TPro  6.8  /  Alb  2.4<L>  /  TBili  0.2  /  DBili  x   /  AST  23  /  ALT  <5<L>  /  AlkPhos  325<H>  03-05          Hemoglobin A1C, Whole Blood: 8.6 % <H> [4.0 - 5.6] (01-31-18 @ 07:15)  Hemoglobin A1C, Whole Blood: 8.7 % <H> [4.0 - 5.6] (01-31-18 @ 05:51)

## 2018-03-05 NOTE — PROGRESS NOTE ADULT - SUBJECTIVE AND OBJECTIVE BOX
[facility] Division of Kidney Diseases & Hypertension  FOLLOW UP NOTE  [phone]--------------------------------------------------------------------------------  Chief Complaint:Cardiac arrest      24 hour events/subjective:    No acute events noted  Last HD was on 3/3    PAST HISTORY  --------------------------------------------------------------------------------  No significant changes to PMH, PSH, FHx, SHx, unless otherwise noted    ALLERGIES & MEDICATIONS  --------------------------------------------------------------------------------  Allergies    doxycycline (Rash)  isoniazid (Rash)  NIFEdipine (Urticaria; Hives)  vitamin E (Short breath; Urticaria; Hives)    Intolerances      Standing Inpatient Medications  ALBUTerol/ipratropium for Nebulization 3 milliLiter(s) Nebulizer every 8 hours  aspirin  chewable 81 milliGRAM(s) Oral daily  calamine Lotion 1 Application(s) Topical daily  ceFAZolin   IVPB 2000 milliGRAM(s) IV Intermittent <User Schedule>  ceFAZolin   IVPB 3000 milliGRAM(s) IV Intermittent <User Schedule>  dextrose 5%. 1000 milliLiter(s) IV Continuous <Continuous>  dextrose 5%. 1000 milliLiter(s) IV Continuous <Continuous>  dextrose 5%. 1000 milliLiter(s) IV Continuous <Continuous>  dextrose 50% Injectable 12.5 Gram(s) IV Push once  dextrose 50% Injectable 25 Gram(s) IV Push once  dextrose 50% Injectable 25 Gram(s) IV Push once  epoetin odalis Injectable 32448 Unit(s) IV Push <User Schedule>  heparin  Injectable 5000 Unit(s) SubCutaneous every 8 hours  insulin lispro (HumaLOG) corrective regimen sliding scale   SubCutaneous every 6 hours  insulin NPH human recombinant 18 Unit(s) SubCutaneous every 6 hours  nystatin Powder 1 Application(s) Topical two times a day  sodium chloride 3%  Inhalation 3 milliLiter(s) Inhalation three times a day    PRN Inpatient Medications  chlorhexidine 0.12% Liquid 15 milliLiter(s) Swish and Spit every 4 hours PRN  dextrose Gel 1 Dose(s) Oral once PRN  glucagon  Injectable 1 milliGRAM(s) IntraMuscular once PRN  hydrocortisone 1% Ointment 1 Application(s) Topical every 8 hours PRN  midodrine 10 milliGRAM(s) Oral every other day PRN      REVIEW OF SYSTEMS  --------------------------------------------------------------------------------  Gen: No  fevers/chills  Skin: No rashes  Head/Eyes/Ears/Mouth: No headache; Normal hearing; Normal vision w/o blurriness  Respiratory: No dyspnea, cough, wheezing, hemoptysis  CV: No chest pain, PND, orthopnea  GI: No abdominal pain, diarrhea, constipation, nausea, vomiting  : No increased frequency, dysuria, hematuria, nocturia  MSK: No joint pain/swelling; no back pain; no edema  Neuro: No dizziness/lightheadedness, weakness, seizures, numbness, tingling      All other systems were reviewed and are negative, except as noted.    VITALS/PHYSICAL EXAM  --------------------------------------------------------------------------------  T(C): 36.8 (03-05-18 @ 04:00), Max: 37.2 (03-04-18 @ 16:00)  HR: 98 (03-05-18 @ 12:40) (82 - 120)  BP: 143/64 (03-05-18 @ 12:40) (124/56 - 173/76)  RR: 21 (03-05-18 @ 12:40) (15 - 27)  SpO2: 100% (03-05-18 @ 12:40) (92% - 100%)  Wt(kg): --        03-04-18 @ 07:01  -  03-05-18 @ 07:00  --------------------------------------------------------  IN: 1040 mL / OUT: 0 mL / NET: 1040 mL      Physical Exam:  	      LABS/STUDIES  --------------------------------------------------------------------------------              7.5    7.1   >-----------<  330      [03-05-18 @ 00:25]              23.0     138  |  98  |  55  ----------------------------<  309      [03-05-18 @ 00:25]  3.6   |  27  |  3.98        Ca     8.9     [03-05-18 @ 00:25]      Mg     2.6     [03-05-18 @ 00:25]      Phos  4.2     [03-05-18 @ 00:25]    TPro  6.8  /  Alb  2.4  /  TBili  0.2  /  DBili  x   /  AST  23  /  ALT  <5  /  AlkPhos  325  [03-05-18 @ 00:25]    PT/INR: PT 10.7 , INR 0.99       [03-05-18 @ 00:25]  PTT: 27.9       [03-05-18 @ 00:25]      Creatinine Trend:  SCr 3.98 [03-05 @ 00:25]  SCr 3.12 [03-04 @ 00:22]  SCr 4.36 [03-02 @ 23:38]  SCr 3.27 [03-01 @ 23:51]  SCr 4.34 [02-28 @ 22:56]          HBsAb <3.0      [02-28-18 @ 20:23]  HBsAg Nonreact      [02-28-18 @ 20:23]  HCV 0.24, Nonreact      [02-28-18 @ 20:23] [facility] Division of Kidney Diseases & Hypertension  FOLLOW UP NOTE  [phone]--------------------------------------------------------------------------------  Chief Complaint:Cardiac arrest      24 hour events/subjective:    No acute events noted  Last HD was on 3/3/18    PAST HISTORY  --------------------------------------------------------------------------------  No significant changes to PMH, PSH, FHx, SHx, unless otherwise noted    ALLERGIES & MEDICATIONS  --------------------------------------------------------------------------------  Allergies    doxycycline (Rash)  isoniazid (Rash)  NIFEdipine (Urticaria; Hives)  vitamin E (Short breath; Urticaria; Hives)    Intolerances      Standing Inpatient Medications  ALBUTerol/ipratropium for Nebulization 3 milliLiter(s) Nebulizer every 8 hours  aspirin  chewable 81 milliGRAM(s) Oral daily  calamine Lotion 1 Application(s) Topical daily  ceFAZolin   IVPB 2000 milliGRAM(s) IV Intermittent <User Schedule>  ceFAZolin   IVPB 3000 milliGRAM(s) IV Intermittent <User Schedule>  dextrose 5%. 1000 milliLiter(s) IV Continuous <Continuous>  dextrose 5%. 1000 milliLiter(s) IV Continuous <Continuous>  dextrose 5%. 1000 milliLiter(s) IV Continuous <Continuous>  dextrose 50% Injectable 12.5 Gram(s) IV Push once  dextrose 50% Injectable 25 Gram(s) IV Push once  dextrose 50% Injectable 25 Gram(s) IV Push once  epoetin odalis Injectable 07287 Unit(s) IV Push <User Schedule>  heparin  Injectable 5000 Unit(s) SubCutaneous every 8 hours  insulin lispro (HumaLOG) corrective regimen sliding scale   SubCutaneous every 6 hours  insulin NPH human recombinant 18 Unit(s) SubCutaneous every 6 hours  nystatin Powder 1 Application(s) Topical two times a day  sodium chloride 3%  Inhalation 3 milliLiter(s) Inhalation three times a day    PRN Inpatient Medications  chlorhexidine 0.12% Liquid 15 milliLiter(s) Swish and Spit every 4 hours PRN  dextrose Gel 1 Dose(s) Oral once PRN  glucagon  Injectable 1 milliGRAM(s) IntraMuscular once PRN  hydrocortisone 1% Ointment 1 Application(s) Topical every 8 hours PRN  midodrine 10 milliGRAM(s) Oral every other day PRN      REVIEW OF SYSTEMS  --------------------------------------------------------------------------------  Unable to obtain.     VITALS/PHYSICAL EXAM  --------------------------------------------------------------------------------  T(C): 36.8 (03-05-18 @ 04:00), Max: 37.2 (03-04-18 @ 16:00)  HR: 98 (03-05-18 @ 12:40) (82 - 120)  BP: 143/64 (03-05-18 @ 12:40) (124/56 - 173/76)  RR: 21 (03-05-18 @ 12:40) (15 - 27)  SpO2: 100% (03-05-18 @ 12:40) (92% - 100%)  Wt(kg): --        03-04-18 @ 07:01  -  03-05-18 @ 07:00  --------------------------------------------------------  IN: 1040 mL / OUT: 0 mL / NET: 1040 mL      Physical Exam:  	  GEn- NAD, on face mask   Chest- coarse breath sounds present.   Heart- S1/S2 rrr  Abdomen- soft, nd  Extremities- no edema  Vascular access: Right IJ non tunneled HD catheter present.     LABS/STUDIES  --------------------------------------------------------------------------------              7.5    7.1   >-----------<  330      [03-05-18 @ 00:25]              23.0     138  |  98  |  55  ----------------------------<  309      [03-05-18 @ 00:25]  3.6   |  27  |  3.98        Ca     8.9     [03-05-18 @ 00:25]      Mg     2.6     [03-05-18 @ 00:25]      Phos  4.2     [03-05-18 @ 00:25]    TPro  6.8  /  Alb  2.4  /  TBili  0.2  /  DBili  x   /  AST  23  /  ALT  <5  /  AlkPhos  325  [03-05-18 @ 00:25]    PT/INR: PT 10.7 , INR 0.99       [03-05-18 @ 00:25]  PTT: 27.9       [03-05-18 @ 00:25]      Creatinine Trend:  SCr 3.98 [03-05 @ 00:25]  SCr 3.12 [03-04 @ 00:22]  SCr 4.36 [03-02 @ 23:38]  SCr 3.27 [03-01 @ 23:51]  SCr 4.34 [02-28 @ 22:56]          HBsAb <3.0      [02-28-18 @ 20:23]  HBsAg Nonreact      [02-28-18 @ 20:23]  HCV 0.24, Nonreact      [02-28-18 @ 20:23]

## 2018-03-05 NOTE — CHART NOTE - NSCHARTNOTEFT_GEN_A_CORE
67F PMH HTN, DMT2 (70/30 TDD: 90 on admission), Breast Cancer (diagnosed in Feb 2017) currently on Herceptin (last dose Jan 20th), originally presented 1/30 with Fever found to have influenza subsequently went into PEA arrest x 2 with ROSC, complicated by bilateral pneumothoraces s/p 3 chest tubes and paracentesis decompression of the abdomen, then found to have PE s/p tPA 1/30, also s/p new CVA, and also new ESRD requiring almost daily HD.     Patient was admitted to MICU for further management. Patient was found to have acute renal failure and shock liver. Nephrology was consulted and patient was started on dialysis. Patient was started on Tamiflu for flu and zosyn for possible aspiration pneumonia. Patients chest tubes and peritoneal drain were removed. Patient redeveloped a pneumothorax on the L side and chest tube was reinserted. 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 Urine legionella was negative and azithromycin was discontinued. Patient continued to be anuric and continued to receive dialysis. Shock liver resolved. Patient was extubated on 2/8/17. Patient completed a 5 day course of tamiflu and 7 day course of zosyn and did not show signs of infection. Patients pneumothorax on L side resolved and chest tube was removed. Patients mental status improved and was alert and orientedx1-2 and spontaneously moves all extremities. Patient had an NG tube placed pending an official speech and swallow eval. Patient failed speech and swallow an a repeat speech and swallow was planned. Patient was then transferred to the floors for further management.     Per primary team, patient became tachypneic and hypoxic overnight at 1am, was switched from nasal cannula to BIPAP overnight. Tube feeds discontinued overnight. Cxray done at that time was unchanged. RRT initially called this AM for worsening tachypnea. Pt was evaluated at bedside breathing around 30s with belly breathing, hypoxic to 85% on 40% FIO2, increased to 100% with improvement in oxygen saturation to 97%. Cxray read as worsening pulm edema. ABG showed hypercapnia. Renal called for urgent HD. Also concern for asp pna as increasing density of RLL. 2nd RRT. Pt with worsening resp status, worsening tachypnea to 40s. Anesthesia called stat for intubation. GOC clarified with  who wanted full code.  After intubation, 3rd RRT called-not able to get BP. 67F PMH HTN, DMT2 (70/30 TDD: 90 on admission), Breast Cancer (diagnosed in Feb 2017) currently on Herceptin (last dose Jan 20th), originally presented 1/30 with Fever found to have influenza subsequently went into PEA arrest x 2 with ROSC, complicated by bilateral pneumothoraces s/p 3 chest tubes and paracentesis decompression of the abdomen, then found to have PE s/p tPA 1/30, also s/p new CVA, and also new ESRD requiring almost daily HD.     First MICU admission:  Patient was admitted to MICU for further management. Patient was found to have acute renal failure and shock liver. Nephrology was consulted and patient was started on dialysis. Patient was started on Tamiflu for flu and zosyn for possible aspiration pneumonia. Patients chest tubes and peritoneal drain were removed. Patient redeveloped a pneumothorax on the L side and chest tube was reinserted. 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 Urine legionella was negative and azithromycin was discontinued. Patient continued to be anuric and continued to receive dialysis. Shock liver resolved. Patient was extubated on 2/8/17. Patient completed a 5 day course of tamiflu and 7 day course of zosyn and did not show signs of infection. Patients pneumothorax on L side resolved and chest tube was removed. Patients mental status improved and was alert and oriented x1-2 and spontaneously moves all extremities. Patient had an NG tube placed pending an official speech and swallow eval. Patient failed speech and swallow an a repeat speech and swallow was planned. Patient was then transferred to the floors for further management.         Second MICU admission on 2/21.   RRT initially called this AM for worsening tachypnea. Pt was evaluated at bedside breathing around 30s with belly breathing, hypoxic to 85% on 40% FIO2, increased to 100% with improvement in oxygen saturation to 97%. Cxray read as worsening pulm edema. ABG showed hypercapnia. Renal called for urgent HD. Also concern for aspiration PNA as increasing density of RLL.     During MICU admission patient treated with 5 days of vanc and zosyn, found to have MSSA bacteremia and MSSA in the sputum, now transitioned to cefazolin until 3/22 (post dialysis). Bacteremia has cleared since 2/22. Patient was extubated on 3/1 to nasal cannula, and is saturating well. She has marked amounts of secretions, being sunctioned now q3-4 hours, and assisted with metaneb, acapella and incentive spirometer. Patient is now hemodynamically stable for transfer to the RCU, and requires adequate respiratory care to ensure she does not aspirate again.      Assesment/ Plan:  68F PMH HTN, DMT2, Breast Cancer (diagnosed in Feb 2017) currently on trastuzumab (last dose Jan 20th), originally presented 1/30 with fever found to have influenza subsequently went into PEA arrest x 2 with ROSC, complicated by bilateral pneumothoraces with pneumomediastinum s/p 3 chest tubes, and also new ESRD requiring almost daily HD, readmitted to the MICU for hypoxic respiratory failure likely 2/2 aspiration pneumonia, extubated on 3/1, with stable respiratory status, requiring frequent suctioning      # Neuro:  - patient alert and following commands  - Known watershed ischemia of the occiput from low flow state during admission 2/2 flu    # CV:  - currently off pressors  - MAPs > 65  - BPs acceptable at this time  - restart carvedilol 6.25 q 12      # Pulm:  - s/p extubation after aspiration pneumonia  - continue with ancef for total of 4 weeks for MSSA bacteremia ( from February 24th- until March 24th)  - marked pulmonary secretions- c/w inhaled 3% saline with duonebs, start use of acapella and incentive spirometry      # Renal:  - s/p dialysis on 3/3  - Patient pending permacath placement by IR once out of the ICU  - Appreciate renal recommendations    # ID: concern for aspiration PNA with blood culture and sputum positive for staph aureus    - last BCX from 2/24 shows clearance of infection  - MSSA bacteremia s/p 5 days of vanc/ zosyn, 3 days of nafcillin, now on Ancef switched from nafcillin as patient may have allergic reaction to naf)- dose ancef with dialysis (total 4 weeks) until March 24th (- ancef scheduling per dialysis schedule (i.e. If schedule T-T-Sa (2-2-3 grams) )  - IR for permacath- IR states permacath cannot be placed in MICU if patient has shiley  - echo shows no signs of vegetation    # GI:  - continue feeds via NG, Nepro @ 40cc/hr x 24 hrs to provide 960cc fluids recommended by nutrition  - GI ppx with protonix 40mg daily  - speech and swallow eval in the next 1-2 days      # Endocrine:  - Continue tube feeds  - Continue with NPH 15 units at 6 am and 18 units q6    # Heme:  - Will continue to trend H/H  - Currently holding Herceptin    # Skin:  - improvement in pruritus no changes in rash  - c/w loratadine and calamine lotion  - continue to monitor     # DVT ppx:  - HSC 5000 units Q8 hours        To follow up"  - please call IR immediately for permacath placement for dialysis (next dialysis tomorrow)  - speech and swallow eval in next 1-2 days  - f/u with ID for restarting herceptin for breast cancer  - blood pressure control as needed

## 2018-03-05 NOTE — PROGRESS NOTE ADULT - SUBJECTIVE AND OBJECTIVE BOX
CHIEF COMPLAINT:Patient is a 68y old  Female who presents with a chief complaint of Fever, total body weakness (02 Feb 2018 13:44)    Interval Events:  Overnight, patient with elevated SBP to 170, s/p 1 x hydralazine.         REVIEW OF SYSTEMS:  Constitutional: [ ] negative [ ] fevers [ ] chills [ ] weight loss [ ] weight gain  HEENT: [ ] negative [ ] dry eyes [ ] eye irritation [ ] postnasal drip [ ] nasal congestion  CV: [ ] negative  [ ] chest pain [ ] orthopnea [ ] palpitations [ ] murmur  Resp: [ ] negative [ ] cough [ ] shortness of breath [ ] dyspnea [ ] wheezing [ ] sputum [ ] hemoptysis  GI: [ ] negative [ ] nausea [ ] vomiting [ ] diarrhea [ ] constipation [ ] abd pain [ ] dysphagia   : [ ] negative [ ] dysuria [ ] nocturia [ ] hematuria [ ] increased urinary frequency  Musculoskeletal: [ ] negative [ ] back pain [ ] myalgias [ ] arthralgias [ ] fracture  Skin: [ ] negative [ ] rash [ ] itch  Neurological: [ ] negative [ ] headache [ ] dizziness [ ] syncope [ ] weakness [ ] numbness  Psychiatric: [ ] negative [ ] anxiety [ ] depression  Endocrine: [ ] negative [ ] diabetes [ ] thyroid problem  Hematologic/Lymphatic: [ ] negative [ ] anemia [ ] bleeding problem  Allergic/Immunologic: [ ] negative [ ] itchy eyes [ ] nasal discharge [ ] hives [ ] angioedema  [ ] All other systems negative  [ ] Unable to assess ROS because ________    OBJECTIVE:  ICU Vital Signs Last 24 Hrs  T(C): 36.8 (05 Mar 2018 04:00), Max: 37.6 (04 Mar 2018 12:00)  T(F): 98.2 (05 Mar 2018 04:00), Max: 99.6 (04 Mar 2018 12:00)  HR: 108 (05 Mar 2018 07:00) (77 - 120)  BP: 124/56 (05 Mar 2018 07:00) (124/56 - 173/76)  BP(mean): 81 (05 Mar 2018 07:00) (77 - 109)  ABP: --  ABP(mean): --  RR: 22 (05 Mar 2018 07:00) (16 - 25)  SpO2: 92% (05 Mar 2018 07:00) (92% - 100%)        03-04 @ 07:01  -  03-05 @ 07:00  --------------------------------------------------------  IN: 1040 mL / OUT: 0 mL / NET: 1040 mL      CAPILLARY BLOOD GLUCOSE  229 (03 Mar 2018 18:00)      POCT Blood Glucose.: 229 mg/dL (05 Mar 2018 06:21)      PHYSICAL EXAM:  General: tired appearing   Neurology: A&Ox3, nonfocal, CENTENO x 4  Head:  Normocephalic, atraumatic  ENT:  Mucosa moist, no ulcerations  Neck:  Supple, no sinuses or palpable masses  Lymphatic:  No palpable cervical, supraclavicular, axillary or inguinal adenopathy  Respiratory: CTA B/L  CV: RRR, S1S2, no murmur  Abdominal: Soft, NT, ND no palpable mass  MSK: No edema, + peripheral pulses, FROM all 4 extremity  Incisions: intact, no erythema or drainage    LINES:    HOSPITAL MEDICATIONS:  Standing Meds:  ALBUTerol/ipratropium for Nebulization 3 milliLiter(s) Nebulizer every 8 hours  aspirin  chewable 81 milliGRAM(s) Oral daily  calamine Lotion 1 Application(s) Topical daily  ceFAZolin   IVPB 2000 milliGRAM(s) IV Intermittent <User Schedule>  ceFAZolin   IVPB 3000 milliGRAM(s) IV Intermittent <User Schedule>  dextrose 5%. 1000 milliLiter(s) IV Continuous <Continuous>  dextrose 5%. 1000 milliLiter(s) IV Continuous <Continuous>  dextrose 5%. 1000 milliLiter(s) IV Continuous <Continuous>  dextrose 50% Injectable 12.5 Gram(s) IV Push once  dextrose 50% Injectable 25 Gram(s) IV Push once  dextrose 50% Injectable 25 Gram(s) IV Push once  epoetin odalis Injectable 19026 Unit(s) IV Push <User Schedule>  heparin  Injectable 5000 Unit(s) SubCutaneous every 8 hours  insulin lispro (HumaLOG) corrective regimen sliding scale   SubCutaneous every 6 hours  insulin NPH human recombinant 15 Unit(s) SubCutaneous every 6 hours  loratadine 10 milliGRAM(s) Oral daily  nystatin Powder 1 Application(s) Topical two times a day  sodium chloride 3%  Inhalation 3 milliLiter(s) Inhalation three times a day      PRN Meds:  chlorhexidine 0.12% Liquid 15 milliLiter(s) Swish and Spit every 4 hours PRN  dextrose Gel 1 Dose(s) Oral once PRN  glucagon  Injectable 1 milliGRAM(s) IntraMuscular once PRN  hydrocortisone 1% Ointment 1 Application(s) Topical every 8 hours PRN  midodrine 10 milliGRAM(s) Oral every other day PRN      LABS:                        7.5    7.1   )-----------( 330      ( 05 Mar 2018 00:25 )             23.0     Hgb Trend: 7.5<--, 7.2<--, 7.5<--, 7.6<--, 7.2<--  03-05    138  |  98  |  55<H>  ----------------------------<  309<H>  3.6   |  27  |  3.98<H>    Ca    8.9      05 Mar 2018 00:25  Phos  4.2     03-05  Mg     2.6     03-05    TPro  6.8  /  Alb  2.4<L>  /  TBili  0.2  /  DBili  x   /  AST  23  /  ALT  <5<L>  /  AlkPhos  325<H>  03-05    Creatinine Trend: 3.98<--, 3.12<--, 4.36<--, 3.27<--, 4.34<--, 3.47<--  PT/INR - ( 05 Mar 2018 00:25 )   PT: 10.7 sec;   INR: 0.99 ratio         PTT - ( 05 Mar 2018 00:25 )  PTT:27.9 sec          MICROBIOLOGY:     RADIOLOGY:  [ ] Reviewed and interpreted by me    EKG:

## 2018-03-05 NOTE — PROGRESS NOTE ADULT - ATTENDING COMMENTS
RUSS- anuric ATN- no major sign of recovery- cont HD; plan HD tomorrow based on labs  access- has femoral shiley- will need permcath

## 2018-03-05 NOTE — PROGRESS NOTE ADULT - ASSESSMENT
68F PMH HTN, DMT2, Breast Cancer (diagnosed in Feb 2017) currently on trastuzumab (last dose Jan 20th), originally presented 1/30 with fever found to have influenza subsequently went into PEA arrest x 2 with ROSC, complicated by bilateral pneumothoraces with pneumomediastinum s/p 3 chest tubes, and also new ESRD requiring almost daily HD, readmitted to the MICU for hypoxic respiratory failure likely 2/2 aspiration pneumonia, extubated on 3/1, with stable respiratory status, requiring frequent suctioning      # Neuro:  - patient alert and following commands  - Known watershed ischemia of the occiput from low flow state during admission 2/2 flu    # CV:  - currently off pressors  - MAPs > 65  - BPs acceptable at this time  - restart carvedilol 6.25 q 12      # Pulm:  - s/p extubation after aspiration pneumonia  - continue with ancef for total of 4 weeks for MSSA bacteremia ( from February 24th- until March 24th)  - marked pulmonary secretions- c/w inhaled 3% saline with duonebs, start use of acapella and incentive spirometry      # Renal:  - s/p dialysis on 3/3  - Patient pending permacath placement by IR once out of the ICU  - Appreciate renal recommendations    # ID: concern for aspiration PNA with blood culture and sputum positive for staph aureus    - last BCX from 2/24 shows clearance of infection  - MSSA bacteremia s/p 5 days of vanc/ zosyn, 3 days of nafcillin, now on Ancef switched from nafcillin as patient may have allergic reaction to naf)- dose ancef with dialysis (total 4 weeks) until March 24th (- ancef scheduling per dialysis schedule (i.e. If schedule T-T-Sa (2-2-3 grams) )  - IR for permacath- IR states permacath cannot be placed in MICU if patient has shiley  - echo shows no signs of vegetation    # GI:  - continue feeds via NG, Nepro @ 40cc/hr x 24 hrs to provide 960cc fluids recommended by nutrition  - GI ppx with protonix 40mg daily  - speech and swallow eval in the next 1-2 days      # Endocrine:  - Continue tube feeds  - Continue with NPH 15 units at 6 am and 18 units q6    # Heme:  - Will continue to trend H/H  - Currently holding Herceptin    # Skin:  - improvement in pruritus no changes in rash  - c/w loratadine and calamine lotion  - continue to monitor     # DVT ppx:  - HSC 5000 units Q8 hours

## 2018-03-06 DIAGNOSIS — R13.10 DYSPHAGIA, UNSPECIFIED: ICD-10-CM

## 2018-03-06 DIAGNOSIS — E11.9 TYPE 2 DIABETES MELLITUS WITHOUT COMPLICATIONS: ICD-10-CM

## 2018-03-06 DIAGNOSIS — R78.81 BACTEREMIA: ICD-10-CM

## 2018-03-06 LAB
ALBUMIN SERPL ELPH-MCNC: 2.5 G/DL — LOW (ref 3.3–5)
ALP SERPL-CCNC: 315 U/L — HIGH (ref 40–120)
ALT FLD-CCNC: <5 U/L RC — LOW (ref 10–45)
ANION GAP SERPL CALC-SCNC: 15 MMOL/L — SIGNIFICANT CHANGE UP (ref 5–17)
APTT BLD: 29.6 SEC — SIGNIFICANT CHANGE UP (ref 27.5–37.4)
AST SERPL-CCNC: 26 U/L — SIGNIFICANT CHANGE UP (ref 10–40)
BASOPHILS # BLD AUTO: 0 K/UL — SIGNIFICANT CHANGE UP (ref 0–0.2)
BASOPHILS NFR BLD AUTO: 0.6 % — SIGNIFICANT CHANGE UP (ref 0–2)
BILIRUB SERPL-MCNC: 0.2 MG/DL — SIGNIFICANT CHANGE UP (ref 0.2–1.2)
BUN SERPL-MCNC: 72 MG/DL — HIGH (ref 7–23)
CALCIUM SERPL-MCNC: 9.4 MG/DL — SIGNIFICANT CHANGE UP (ref 8.4–10.5)
CHLORIDE SERPL-SCNC: 101 MMOL/L — SIGNIFICANT CHANGE UP (ref 96–108)
CO2 SERPL-SCNC: 25 MMOL/L — SIGNIFICANT CHANGE UP (ref 22–31)
CREAT SERPL-MCNC: 4.81 MG/DL — HIGH (ref 0.5–1.3)
EOSINOPHIL # BLD AUTO: 0.5 K/UL — SIGNIFICANT CHANGE UP (ref 0–0.5)
EOSINOPHIL NFR BLD AUTO: 6.3 % — HIGH (ref 0–6)
GLUCOSE BLDC GLUCOMTR-MCNC: 102 MG/DL — HIGH (ref 70–99)
GLUCOSE BLDC GLUCOMTR-MCNC: 117 MG/DL — HIGH (ref 70–99)
GLUCOSE BLDC GLUCOMTR-MCNC: 171 MG/DL — HIGH (ref 70–99)
GLUCOSE BLDC GLUCOMTR-MCNC: 195 MG/DL — HIGH (ref 70–99)
GLUCOSE SERPL-MCNC: 176 MG/DL — HIGH (ref 70–99)
HCT VFR BLD CALC: 22.8 % — LOW (ref 34.5–45)
HGB BLD-MCNC: 7.4 G/DL — LOW (ref 11.5–15.5)
INR BLD: 0.96 RATIO — SIGNIFICANT CHANGE UP (ref 0.88–1.16)
LYMPHOCYTES # BLD AUTO: 0.6 K/UL — LOW (ref 1–3.3)
LYMPHOCYTES # BLD AUTO: 8.2 % — LOW (ref 13–44)
MAGNESIUM SERPL-MCNC: 2.9 MG/DL — HIGH (ref 1.6–2.6)
MCHC RBC-ENTMCNC: 32.3 PG — SIGNIFICANT CHANGE UP (ref 27–34)
MCHC RBC-ENTMCNC: 32.5 GM/DL — SIGNIFICANT CHANGE UP (ref 32–36)
MCV RBC AUTO: 99.4 FL — SIGNIFICANT CHANGE UP (ref 80–100)
MONOCYTES # BLD AUTO: 0.9 K/UL — SIGNIFICANT CHANGE UP (ref 0–0.9)
MONOCYTES NFR BLD AUTO: 13.1 % — SIGNIFICANT CHANGE UP (ref 2–14)
NEUTROPHILS # BLD AUTO: 5.2 K/UL — SIGNIFICANT CHANGE UP (ref 1.8–7.4)
NEUTROPHILS NFR BLD AUTO: 71.9 % — SIGNIFICANT CHANGE UP (ref 43–77)
PHOSPHATE SERPL-MCNC: 5.6 MG/DL — HIGH (ref 2.5–4.5)
PLATELET # BLD AUTO: 355 K/UL — SIGNIFICANT CHANGE UP (ref 150–400)
POTASSIUM SERPL-MCNC: 3.7 MMOL/L — SIGNIFICANT CHANGE UP (ref 3.5–5.3)
POTASSIUM SERPL-SCNC: 3.7 MMOL/L — SIGNIFICANT CHANGE UP (ref 3.5–5.3)
PROT SERPL-MCNC: 6.8 G/DL — SIGNIFICANT CHANGE UP (ref 6–8.3)
PROTHROM AB SERPL-ACNC: 10.4 SEC — SIGNIFICANT CHANGE UP (ref 9.8–12.7)
RBC # BLD: 2.29 M/UL — LOW (ref 3.8–5.2)
RBC # FLD: 16.8 % — HIGH (ref 10.3–14.5)
SODIUM SERPL-SCNC: 141 MMOL/L — SIGNIFICANT CHANGE UP (ref 135–145)
WBC # BLD: 7.2 K/UL — SIGNIFICANT CHANGE UP (ref 3.8–10.5)
WBC # FLD AUTO: 7.2 K/UL — SIGNIFICANT CHANGE UP (ref 3.8–10.5)

## 2018-03-06 PROCEDURE — 99232 SBSQ HOSP IP/OBS MODERATE 35: CPT

## 2018-03-06 PROCEDURE — 99233 SBSQ HOSP IP/OBS HIGH 50: CPT | Mod: GC

## 2018-03-06 RX ORDER — ALTEPLASE 100 MG
2 KIT INTRAVENOUS ONCE
Qty: 0 | Refills: 0 | Status: COMPLETED | OUTPATIENT
Start: 2018-03-06 | End: 2018-03-06

## 2018-03-06 RX ORDER — HUMAN INSULIN 100 [IU]/ML
15 INJECTION, SUSPENSION SUBCUTANEOUS
Qty: 0 | Refills: 0 | Status: DISCONTINUED | OUTPATIENT
Start: 2018-03-06 | End: 2018-03-11

## 2018-03-06 RX ADMIN — CALAMINE 8% AND ZINC OXIDE 8% 1 APPLICATION(S): 160 LOTION TOPICAL at 12:03

## 2018-03-06 RX ADMIN — HUMAN INSULIN 18 UNIT(S): 100 INJECTION, SUSPENSION SUBCUTANEOUS at 18:19

## 2018-03-06 RX ADMIN — HUMAN INSULIN 18 UNIT(S): 100 INJECTION, SUSPENSION SUBCUTANEOUS at 12:03

## 2018-03-06 RX ADMIN — HUMAN INSULIN 18 UNIT(S): 100 INJECTION, SUSPENSION SUBCUTANEOUS at 00:05

## 2018-03-06 RX ADMIN — ALTEPLASE 2 MILLIGRAM(S): KIT at 14:58

## 2018-03-06 RX ADMIN — Medication 3 MILLILITER(S): at 05:33

## 2018-03-06 RX ADMIN — HUMAN INSULIN 15 UNIT(S): 100 INJECTION, SUSPENSION SUBCUTANEOUS at 05:59

## 2018-03-06 RX ADMIN — HEPARIN SODIUM 5000 UNIT(S): 5000 INJECTION INTRAVENOUS; SUBCUTANEOUS at 14:07

## 2018-03-06 RX ADMIN — ERYTHROPOIETIN 10000 UNIT(S): 10000 INJECTION, SOLUTION INTRAVENOUS; SUBCUTANEOUS at 13:14

## 2018-03-06 RX ADMIN — SODIUM CHLORIDE 3 MILLILITER(S): 9 INJECTION INTRAMUSCULAR; INTRAVENOUS; SUBCUTANEOUS at 14:35

## 2018-03-06 RX ADMIN — Medication 81 MILLIGRAM(S): at 14:07

## 2018-03-06 RX ADMIN — SODIUM CHLORIDE 3 MILLILITER(S): 9 INJECTION INTRAMUSCULAR; INTRAVENOUS; SUBCUTANEOUS at 22:22

## 2018-03-06 RX ADMIN — Medication 3 MILLILITER(S): at 21:17

## 2018-03-06 RX ADMIN — CARVEDILOL PHOSPHATE 6.25 MILLIGRAM(S): 80 CAPSULE, EXTENDED RELEASE ORAL at 18:19

## 2018-03-06 RX ADMIN — Medication 3 MILLILITER(S): at 14:34

## 2018-03-06 RX ADMIN — NYSTATIN CREAM 1 APPLICATION(S): 100000 CREAM TOPICAL at 05:59

## 2018-03-06 RX ADMIN — SODIUM CHLORIDE 3 MILLILITER(S): 9 INJECTION INTRAMUSCULAR; INTRAVENOUS; SUBCUTANEOUS at 05:33

## 2018-03-06 RX ADMIN — Medication 4: at 00:04

## 2018-03-06 RX ADMIN — NYSTATIN CREAM 1 APPLICATION(S): 100000 CREAM TOPICAL at 18:18

## 2018-03-06 RX ADMIN — HEPARIN SODIUM 5000 UNIT(S): 5000 INJECTION INTRAVENOUS; SUBCUTANEOUS at 05:58

## 2018-03-06 RX ADMIN — Medication 2: at 12:03

## 2018-03-06 RX ADMIN — HUMAN INSULIN 18 UNIT(S): 100 INJECTION, SUSPENSION SUBCUTANEOUS at 23:49

## 2018-03-06 RX ADMIN — HEPARIN SODIUM 5000 UNIT(S): 5000 INJECTION INTRAVENOUS; SUBCUTANEOUS at 21:20

## 2018-03-06 RX ADMIN — Medication 2: at 05:59

## 2018-03-06 NOTE — PROGRESS NOTE ADULT - PROBLEM SELECTOR PLAN 7
concern for aspiration PNA with blood culture and sputum positive for staph aureus    - last BCX from 2/24 shows clearance of infection  - MSSA bacteremia s/p 5 days of vanc/ zosyn, 3 days of nafcillin, now on Ancef switched from nafcillin as patient may have allergic reaction to naf)- dose ancef with dialysis (total 4 weeks) until March 22   - echo shows no signs of vegetation

## 2018-03-06 NOTE — PROVIDER CONTACT NOTE (OTHER) - SITUATION
Pt dialysis was unsuccessful due to Right IJ shiley not working. Cath steph given by Dialysis nurse but no success.

## 2018-03-06 NOTE — PROGRESS NOTE ADULT - PROBLEM SELECTOR PLAN 1
-Check bs q6  -Continue NPH 15 units sq q6am and 18 units sq at 12am, 12pm and 6pm  -mod scale q6  -Goal bs 100-180  Pt has f/u with me 4/19/18 at 9am. I can adjust this if her inpatient stay is prolonged.  Patria Weiner MD  [phone]

## 2018-03-06 NOTE — PROGRESS NOTE ADULT - ASSESSMENT
67 y/o F with T2DM uncontrolled on insulin, osteoporosis, HTN, here with acute respiratory failure 2/2 influenza s/p PEA arrest x 2, on month long tx for aspiration pna.

## 2018-03-06 NOTE — PROGRESS NOTE ADULT - ATTENDING COMMENTS
Agree with above. Patient seen and examined. Transferred to RCU  -Acute Hypoxemic Respiratory Failure in the setting of Influenza and developed respiratory failure and cardiac arrest s/p intubation then extubated and reintubated after aspiration event - now extubated but still with mild secretions. Aggressive chest PT. Maintain O2 sat > 90  -Acute Renal Failure - now requiring HD - IR evaluation for permacath. No urgent need for HD today. Monitor electrolytes.  -Oropharyngeal dysphagia - NGT feeds. Speech/swallow reeval - but I suspect patient will require PEG tube   -CVA - watershed infarct with residual left LE weakness  -Weakness and debility - PT

## 2018-03-06 NOTE — PROGRESS NOTE ADULT - SUBJECTIVE AND OBJECTIVE BOX
Follow Up:      Interval History/ROS: no distress, somewhat lethargic -  at bedside    Allergies  doxycycline (Rash)  isoniazid (Rash)  NIFEdipine (Urticaria; Hives)  vitamin E (Short breath; Urticaria; Hives)    ANTIMICROBIALS:  ceFAZolin   IVPB 2000 <User Schedule>  ceFAZolin   IVPB 3000 <User Schedule>    OTHER MEDS:  MEDICATIONS  (STANDING):  ALBUTerol/ipratropium for Nebulization 3 every 8 hours  aspirin  chewable 81 daily  carvedilol 6.25 every 12 hours  dextrose 50% Injectable 12.5 once  dextrose 50% Injectable 25 once  dextrose 50% Injectable 25 once  dextrose Gel 1 once PRN  epoetin odalis Injectable 88277 <User Schedule>  glucagon  Injectable 1 once PRN  heparin  Injectable 5000 every 8 hours  insulin lispro (HumaLOG) corrective regimen sliding scale  every 6 hours  insulin NPH human recombinant 18 <User Schedule>  insulin NPH human recombinant 15 <User Schedule>  midodrine 10 every other day PRN  sodium chloride 3%  Inhalation 3 three times a day      Vital Signs Last 24 Hrs  T(C): 36.8 (06 Mar 2018 12:24), Max: 36.9 (06 Mar 2018 03:44)  T(F): 98.2 (06 Mar 2018 12:24), Max: 98.4 (06 Mar 2018 03:44)  HR: 100 (06 Mar 2018 12:24) (75 - 100)  BP: 153/77 (06 Mar 2018 12:24) (132/70 - 167/72)  BP(mean): 103 (05 Mar 2018 14:00) (100 - 103)  RR: 16 (06 Mar 2018 12:24) (16 - 22)  SpO2: 100% (06 Mar 2018 12:24) (100% - 100%)    PHYSICAL EXAM:  General: WN/WD NAD, Non-toxic  Neurology: responsive  Respiratory: no resp distress, Clear to auscultation bilaterally, fine crackles  CV: RRR, S1S2, no murmurs, rubs or gallops  Abdominal: Soft, Non-tender, non-distended, normal bowel sounds  Extremities: No edema,  Line Sites: Clear - Right IJ HD cath in place  Skin: No rash                        7.4    7.2   )-----------( 355      ( 06 Mar 2018 05:58 )             22.8       03-06    141  |  101  |  72<H>  ----------------------------<  176<H>  3.7   |  25  |  4.81<H>    Ca    9.4      06 Mar 2018 05:58  Phos  5.6     03-06  Mg     2.9     03-06    TPro  6.8  /  Alb  2.5<L>  /  TBili  0.2  /  DBili  x   /  AST  26  /  ALT  <5<L>  /  AlkPhos  315<H>  03-06    MICROBIOLOGY:  .Blood Blood-Venous  02-27-18   No growth at 5 days.  --  --    .Blood Blood-Peripheral  02-24-18   No growth at 5 days.  --  --    .Sputum Sputum trap  02-22-18   Numerous Staphylococcus aureus ***********Note************  This isolate demonstrates inducible  clindamycin resistance.  Clindamycin may still be effective in some patients.  No Normal Respiratory Cate present  --  Staphylococcus aureus    .Blood Blood  02-22-18   Growth in anaerobic bottle: Staphylococcus aureus  ***********Note************  This isolate demonstrates inducible  clindamycin resistance.  Clindamycin may still be effective in some patients.  "Due to technical problems, Proteus sp. will Not be reported as part of  the BCID panel until further notice"  ***Blood Panel PCR results on this specimen are available  approximately 3 hours after the Gram stain result.***  Gram stain, PCR, and/or culture results may not always  correspond due to difference in methodologies.  ************************************************************  This PCR assay was performed using [employer].  The following targets are tested for: Enterococcus,  vancomycin resistant enterococci, Listeria monocytogenes,  coagulase negative staphylococci, S. aureus,  methicillin resistant S. aureus, Streptococcus agalactiae  (Group B), S. pneumoniae, S. pyogenes (Group A),  Acinetobacter baumannii, Enterobacter cloacae, E. coli,  Klebsiella oxytoca, K. pneumoniae, Proteus sp.,  Serratia marcescens, Haemophilus influenzae,  Neisseria meningitidis, Pseudomonas aeruginosa, Candida  albicans, C. glabrata, C krusei, C parapsilosis,  C. tropicalis and the KPC resistance gene.  --  Blood Culture PCR  Staphylococcus aureus      .Blood Blood  02-22-18   No growth at 5 days.  --  --      .Blood Blood-Peripheral  02-19-18   No growth at 5 days.  --  --    RADIOLOGY:  < from: Xray Chest 1 View- PORTABLE-Urgent (02.24.18 @ 08:52) >  There is a new right-sided central venous line with its tip overlying the   proximal superior vena cava. The remainder of the life supporting devices   are unchanged in position. The cardiac silhouette is stable in size. No   pneumothorax is identified on this projection. There is persistent right   basilar opacity, atelectasis versus pneumonia.    < end of copied text >      Jas Durand MD; Division of Infectious Disease; Pager: [phone]; nights and weekends: [phone]

## 2018-03-06 NOTE — PROGRESS NOTE ADULT - SUBJECTIVE AND OBJECTIVE BOX
[facility], [place] Division of Kidney Diseases & Hypertension  FOLLOW UP NOTE  [phone]--------------------------------------------------------------------------------  Chief Complaint:Cardiac arrest      24 hour events/subjective:    No acute events noted.   Attempted to do HD today but HD catheter not working as per dialysis nurse.    PAST HISTORY  --------------------------------------------------------------------------------  No significant changes to PMH, PSH, FHx, SHx, unless otherwise noted    ALLERGIES & MEDICATIONS  --------------------------------------------------------------------------------  Allergies    doxycycline (Rash)  isoniazid (Rash)  NIFEdipine (Urticaria; Hives)  vitamin E (Short breath; Urticaria; Hives)    Intolerances      Standing Inpatient Medications  ALBUTerol/ipratropium for Nebulization 3 milliLiter(s) Nebulizer every 8 hours  aspirin  chewable 81 milliGRAM(s) Oral daily  calamine Lotion 1 Application(s) Topical daily  carvedilol 6.25 milliGRAM(s) Oral every 12 hours  ceFAZolin   IVPB 2000 milliGRAM(s) IV Intermittent <User Schedule>  ceFAZolin   IVPB 3000 milliGRAM(s) IV Intermittent <User Schedule>  dextrose 5%. 1000 milliLiter(s) IV Continuous <Continuous>  dextrose 5%. 1000 milliLiter(s) IV Continuous <Continuous>  dextrose 5%. 1000 milliLiter(s) IV Continuous <Continuous>  dextrose 50% Injectable 12.5 Gram(s) IV Push once  dextrose 50% Injectable 25 Gram(s) IV Push once  dextrose 50% Injectable 25 Gram(s) IV Push once  epoetin odalis Injectable 17327 Unit(s) IV Push <User Schedule>  heparin  Injectable 5000 Unit(s) SubCutaneous every 8 hours  insulin lispro (HumaLOG) corrective regimen sliding scale   SubCutaneous every 6 hours  insulin NPH human recombinant 18 Unit(s) SubCutaneous <User Schedule>  insulin NPH human recombinant 15 Unit(s) SubCutaneous <User Schedule>  nystatin Powder 1 Application(s) Topical two times a day  sodium chloride 3%  Inhalation 3 milliLiter(s) Inhalation three times a day    PRN Inpatient Medications  chlorhexidine 0.12% Liquid 15 milliLiter(s) Swish and Spit every 4 hours PRN  dextrose Gel 1 Dose(s) Oral once PRN  glucagon  Injectable 1 milliGRAM(s) IntraMuscular once PRN  hydrocortisone 1% Ointment 1 Application(s) Topical every 8 hours PRN  midodrine 10 milliGRAM(s) Oral every other day PRN      REVIEW OF SYSTEMS  --------------------------------------------------------------------------------  Gen: No  fevers/chills  Skin: No rashes  Head/Eyes/Ears/Mouth: No headache; Normal hearing; Normal vision w/o blurriness  Respiratory: No dyspnea, cough, wheezing, hemoptysis  CV: No chest pain, PND, orthopnea  GI: No abdominal pain, diarrhea, constipation, nausea, vomiting  : No increased frequency, dysuria, hematuria, nocturia  MSK: No joint pain/swelling; no back pain; no edema  Neuro: No dizziness/lightheadedness, weakness, seizures, numbness, tingling      All other systems were reviewed and are negative, except as noted.    VITALS/PHYSICAL EXAM  --------------------------------------------------------------------------------  T(C): 36.8 (03-06-18 @ 12:24), Max: 36.9 (03-06-18 @ 03:44)  HR: 100 (03-06-18 @ 12:24) (75 - 100)  BP: 153/77 (03-06-18 @ 12:24) (132/70 - 154/70)  RR: 16 (03-06-18 @ 12:24) (16 - 16)  SpO2: 100% (03-06-18 @ 12:24) (100% - 100%)  Wt(kg): --        03-05-18 @ 07:01  -  03-06-18 @ 07:00  --------------------------------------------------------  IN: 1190 mL / OUT: 0 mL / NET: 1190 mL    03-06-18 @ 07:01  -  03-06-18 @ 14:01  --------------------------------------------------------  IN: 160 mL / OUT: 0 mL / NET: 160 mL      Physical Exam:  	Gen: NAD  	HEENT: PERRL, supple neck, clear oropharynx  	Pulm: CTA B/L  	CV: RRR, S1S2;  	Back: No spinal or CVA tenderness  	Abd: +BS, soft, nontender/nondistended  	: No suprapubic tenderness              Extremities: no bilateral LE edema noted.               Neuro: Awake   	Skin: Warm and dry   	Vascular access: Right IJ non tunneled HD catheter present.     LABS/STUDIES  --------------------------------------------------------------------------------              7.4    7.2   >-----------<  355      [03-06-18 @ 05:58]              22.8     141  |  101  |  72  ----------------------------<  176      [03-06-18 @ 05:58]  3.7   |  25  |  4.81        Ca     9.4     [03-06-18 @ 05:58]      Mg     2.9     [03-06-18 @ 05:58]      Phos  5.6     [03-06-18 @ 05:58]    TPro  6.8  /  Alb  2.5  /  TBili  0.2  /  DBili  x   /  AST  26  /  ALT  <5  /  AlkPhos  315  [03-06-18 @ 05:58]    PT/INR: PT 10.4 , INR 0.96       [03-06-18 @ 06:01]  PTT: 29.6       [03-06-18 @ 06:01]      Creatinine Trend:  SCr 4.81 [03-06 @ 05:58]  SCr 3.98 [03-05 @ 00:25]  SCr 3.12 [03-04 @ 00:22]  SCr 4.36 [03-02 @ 23:38]  SCr 3.27 [03-01 @ 23:51]          HBsAb <3.0      [02-28-18 @ 20:23]  HBsAg Nonreact      [02-28-18 @ 20:23]  HCV 0.24, Nonreact      [02-28-18 @ 20:23]

## 2018-03-06 NOTE — PROGRESS NOTE ADULT - ATTENDING COMMENTS
RUSS- anuric ATN- no major sign of recovery- cont HD; plan HD tomorrow based on labs  access- has femoral shiley- will need permcath RUSS- anuric ATN- no major sign of recovery on labs despite possible increase in UO per ; plan HD today  access- radha odom- will need permcath once ID has given clearance per med team  anemia ckd-epo tiw

## 2018-03-06 NOTE — PROGRESS NOTE ADULT - SUBJECTIVE AND OBJECTIVE BOX
Patient is a 68y old  Female who presents with a chief complaint of Fever, total body weakness (02 Feb 2018 13:44)      Interval Events:    REVIEW OF SYSTEMS:  [ ] Positive  [ ] All other systems negative  [ ] Unable to assess ROS because ________    Vital Signs Last 24 Hrs  T(C): 36.9 (03-06-18 @ 03:44), Max: 36.9 (03-06-18 @ 03:44)  T(F): 98.4 (03-06-18 @ 03:44), Max: 98.4 (03-06-18 @ 03:44)  HR: 92 (03-06-18 @ 03:44) (75 - 116)  BP: 154/70 (03-06-18 @ 03:44) (132/70 - 172/72)  RR: 16 (03-06-18 @ 03:44) (16 - 27)  SpO2: 100% (03-06-18 @ 03:44) (100% - 100%)    PHYSICAL EXAM:  HEENT:   [ ]Tracheostomy: NC   [ ]Pupils equal  [ ]No oral lesions  [ ]Abnormal    SKIN  [ ]No Rash  [x ] Abnormal-IAD  [ ] pressure    CARDIAC  [ x]Regular  [ ]Abnormal    PULMONARY  [x ]Bilateral Clear Breath Sounds  [ ]Normal Excursion  [ ]Abnormal    GI  [x ]PEG      [x ] +BS		              [ x]Soft, nondistended, nontender	  [ ]Abnormal    MUSCULOSKELETAL                                   [ ]Bedbound                 [ ]Abnormal    [ ]Ambulatory/OOB to chair                           EXTREMITIES                                         [ ]Normal  [ ]Edema                           NEUROLOGIC  [ ] Normal, non focal  [x ] Focal findings: 3/5 to rle    PSYCHIATRIC  [ x]Alert and appropriate  [ ] Sedated	 [ ]Agitated    :  Wheeler: [ ] Yes, if yes: Date of Placement:                   [  ] No    LINES: Central Lines [ ] Yes, if yes: Date of Placement                                     [  ] No    HOSPITAL MEDICATIONS:  MEDICATIONS  (STANDING):  ALBUTerol/ipratropium for Nebulization 3 milliLiter(s) Nebulizer every 8 hours  aspirin  chewable 81 milliGRAM(s) Oral daily  calamine Lotion 1 Application(s) Topical daily  carvedilol 6.25 milliGRAM(s) Oral every 12 hours  ceFAZolin   IVPB 2000 milliGRAM(s) IV Intermittent <User Schedule>  ceFAZolin   IVPB 3000 milliGRAM(s) IV Intermittent <User Schedule>  dextrose 5%. 1000 milliLiter(s) (50 mL/Hr) IV Continuous <Continuous>  dextrose 5%. 1000 milliLiter(s) (50 mL/Hr) IV Continuous <Continuous>  dextrose 5%. 1000 milliLiter(s) (50 mL/Hr) IV Continuous <Continuous>  dextrose 50% Injectable 12.5 Gram(s) IV Push once  dextrose 50% Injectable 25 Gram(s) IV Push once  dextrose 50% Injectable 25 Gram(s) IV Push once  epoetin odalis Injectable 76382 Unit(s) IV Push <User Schedule>  heparin  Injectable 5000 Unit(s) SubCutaneous every 8 hours  insulin lispro (HumaLOG) corrective regimen sliding scale   SubCutaneous every 6 hours  insulin NPH human recombinant 18 Unit(s) SubCutaneous <User Schedule>  nystatin Powder 1 Application(s) Topical two times a day  sodium chloride 3%  Inhalation 3 milliLiter(s) Inhalation three times a day    MEDICATIONS  (PRN):  chlorhexidine 0.12% Liquid 15 milliLiter(s) Swish and Spit every 4 hours PRN mouth hygiene  dextrose Gel 1 Dose(s) Oral once PRN Blood Glucose LESS THAN 70 milliGRAM(s)/deciliter  glucagon  Injectable 1 milliGRAM(s) IntraMuscular once PRN Glucose LESS THAN 70 milligrams/deciliter  hydrocortisone 1% Ointment 1 Application(s) Topical every 8 hours PRN Rash and/or Itching  midodrine 10 milliGRAM(s) Oral every other day PRN prior to dialysis      LABS:                        7.4    7.2   )-----------( 355      ( 06 Mar 2018 05:58 )             22.8     03-06    141  |  101  |  72<H>  ----------------------------<  176<H>  3.7   |  25  |  4.81<H>    Ca    9.4      06 Mar 2018 05:58  Phos  5.6     03-06  Mg     2.9     03-06    TPro  6.8  /  Alb  2.5<L>  /  TBili  0.2  /  DBili  x   /  AST  26  /  ALT  <5<L>  /  AlkPhos  315<H>  03-06    PT/INR - ( 06 Mar 2018 06:01 )   PT: 10.4 sec;   INR: 0.96 ratio         PTT - ( 06 Mar 2018 06:01 )  PTT:29.6 sec        CAPILLARY BLOOD GLUCOSE    MICROBIOLOGY:     RADIOLOGY:  [ ] Reviewed and interpreted by me Patient is a 68y old  Female who presents with a chief complaint of Fever, total body weakness (02 Feb 2018 13:44)    Interval Events: Transferred to RCU    REVIEW OF SYSTEMS:  [ ] Positive  [x ] All other systems negative  [ ] Unable to assess ROS because ________    Vital Signs Last 24 Hrs  T(C): 36.9 (03-06-18 @ 03:44), Max: 36.9 (03-06-18 @ 03:44)  T(F): 98.4 (03-06-18 @ 03:44), Max: 98.4 (03-06-18 @ 03:44)  HR: 92 (03-06-18 @ 03:44) (75 - 116)  BP: 154/70 (03-06-18 @ 03:44) (132/70 - 172/72)  RR: 16 (03-06-18 @ 03:44) (16 - 27)  SpO2: 100% (03-06-18 @ 03:44) (100% - 100%)    PHYSICAL EXAM:  HEENT:   [x ]Tracheostomy: N/A  [ ]Pupils equal  [x ]No oral lesions  [ ]Abnormal    SKIN  [ ]No Rash  [x ] Abnormal-IAD  [ ] pressure    CARDIAC  [ x]Regular  [ ]Abnormal    PULMONARY  [x ]Bilateral Clear Breath Sounds  [ ]Normal Excursion  [ ]Abnormal    GI  [x ]PEG      [x ] +BS		              [ x]Soft, nondistended, nontender	  [ ]Abnormal    MUSCULOSKELETAL                                   [ ]Bedbound                 [ ]Abnormal    [ ]Ambulatory/OOB to chair                           EXTREMITIES                                         [ ]Normal  [ ]Edema                           NEUROLOGIC  [ ] Normal, non focal  [x ] Focal findings: 3/5 to rle    PSYCHIATRIC  [ x]Alert and appropriate  [ ] Sedated	 [ ]Agitated    :  Wheeler: [ ] Yes, if yes: Date of Placement:                   [  ] No    LINES: Central Lines [ ] Yes, if yes: Date of Placement                                     [  ] No    HOSPITAL MEDICATIONS:  MEDICATIONS  (STANDING):  ALBUTerol/ipratropium for Nebulization 3 milliLiter(s) Nebulizer every 8 hours  aspirin  chewable 81 milliGRAM(s) Oral daily  calamine Lotion 1 Application(s) Topical daily  carvedilol 6.25 milliGRAM(s) Oral every 12 hours  ceFAZolin   IVPB 2000 milliGRAM(s) IV Intermittent <User Schedule>  ceFAZolin   IVPB 3000 milliGRAM(s) IV Intermittent <User Schedule>  dextrose 5%. 1000 milliLiter(s) (50 mL/Hr) IV Continuous <Continuous>  dextrose 5%. 1000 milliLiter(s) (50 mL/Hr) IV Continuous <Continuous>  dextrose 5%. 1000 milliLiter(s) (50 mL/Hr) IV Continuous <Continuous>  dextrose 50% Injectable 12.5 Gram(s) IV Push once  dextrose 50% Injectable 25 Gram(s) IV Push once  dextrose 50% Injectable 25 Gram(s) IV Push once  epoetin odalis Injectable 80692 Unit(s) IV Push <User Schedule>  heparin  Injectable 5000 Unit(s) SubCutaneous every 8 hours  insulin lispro (HumaLOG) corrective regimen sliding scale   SubCutaneous every 6 hours  insulin NPH human recombinant 18 Unit(s) SubCutaneous <User Schedule>  nystatin Powder 1 Application(s) Topical two times a day  sodium chloride 3%  Inhalation 3 milliLiter(s) Inhalation three times a day    MEDICATIONS  (PRN):  chlorhexidine 0.12% Liquid 15 milliLiter(s) Swish and Spit every 4 hours PRN mouth hygiene  dextrose Gel 1 Dose(s) Oral once PRN Blood Glucose LESS THAN 70 milliGRAM(s)/deciliter  glucagon  Injectable 1 milliGRAM(s) IntraMuscular once PRN Glucose LESS THAN 70 milligrams/deciliter  hydrocortisone 1% Ointment 1 Application(s) Topical every 8 hours PRN Rash and/or Itching  midodrine 10 milliGRAM(s) Oral every other day PRN prior to dialysis      LABS:                        7.4    7.2   )-----------( 355      ( 06 Mar 2018 05:58 )             22.8     03-06    141  |  101  |  72<H>  ----------------------------<  176<H>  3.7   |  25  |  4.81<H>    Ca    9.4      06 Mar 2018 05:58  Phos  5.6     03-06  Mg     2.9     03-06    TPro  6.8  /  Alb  2.5<L>  /  TBili  0.2  /  DBili  x   /  AST  26  /  ALT  <5<L>  /  AlkPhos  315<H>  03-06    PT/INR - ( 06 Mar 2018 06:01 )   PT: 10.4 sec;   INR: 0.96 ratio         PTT - ( 06 Mar 2018 06:01 )  PTT:29.6 sec        CAPILLARY BLOOD GLUCOSE    MICROBIOLOGY:     RADIOLOGY:  [ ] Reviewed and interpreted by me

## 2018-03-06 NOTE — PROGRESS NOTE ADULT - ASSESSMENT
68F PMH HTN, DMT2, Breast Cancer (diagnosed in Feb 2017) currently on trastuzumab (last dose Jan 20th), originally presented 1/30 with fever found to have influenza subsequently went into PEA arrest x 2 with ROSC, complicated by bilateral pneumothoraces with pneumomediastinum s/p 3 chest tubes, and also new ESRD requiring almost daily HD, readmitted to the MICU for hypoxic respiratory failure likely 2/2 aspiration pneumonia with MSSA bacteremia and pneumonia, extubated on 3/1, with stable respiratory status  clinically improved  no resp distress  MSSA bacteremia -cleared since 2/22    continue Cefazolin  ("2-2-3" post dialysis) through 3/22/18  remove Mediport unless needed.    Cleared for PermaCath placement from ID perspective.

## 2018-03-06 NOTE — CHART NOTE - NSCHARTNOTEFT_GEN_A_CORE
HD nurse attempted HD at bedside-    Ashlee babin-  Cathflo inserted  after 1 hour  not able to be unclogged    Called IR   will do PermCath on Thursday per IR -  Npo after midnight wednesday        KYRA Parikh NP  RCU

## 2018-03-06 NOTE — PROGRESS NOTE ADULT - PROBLEM SELECTOR PLAN 1
from ATN in setting of cardiac arrest. Pt continues to be oligo-anuric, requiring dialysis. No evidence of renal recovery at present. Last HD was done on 3/3. Attempted to do HD today but unable to as HD catheter is not functioning. Recommend placement of tunneled HD catheter by IR for vascular acesss.

## 2018-03-06 NOTE — PROGRESS NOTE ADULT - PROBLEM SELECTOR PLAN 8
- continue feeds via NG, Nepro @ 40cc/hr x 24 hrs to provide 960cc fluids recommended by nutrition    - speech and swallow eval in the next 1-2 days

## 2018-03-06 NOTE — PROGRESS NOTE ADULT - ASSESSMENT
67F PMH HTN, DMT2 (70/30 TDD: 90 on admission), Breast Cancer (diagnosed in Feb 2017) currently on Herceptin (last dose Jan 20th), originally presented 1/30 with Fever found to have influenza subsequently went into PEA arrest x 2 with ROSC, complicated by bilateral pneumothoraces s/p 3 chest tubes and paracentesis decompression of the abdomen, then found to have PE s/p tPA 1/30, also s/p new CVA, and also new ESRD requiring almost daily HD.     First MICU admission:  Patient was admitted to MICU for further management. Patient was found to have acute renal failure and shock liver. Nephrology was consulted and patient was started on dialysis. Patient was started on Tamiflu for flu and zosyn for possible aspiration pneumonia. Patients chest tubes and peritoneal drain were removed. Patient redeveloped a pneumothorax on the L side and chest tube was reinserted. 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 Urine legionella was negative and azithromycin was discontinued. Patient continued to be anuric and continued to receive dialysis. Shock liver resolved. Patient was extubated on 2/8/17. Patient completed a 5 day course of tamiflu and 7 day course of zosyn and did not show signs of infection. Patients pneumothorax on L side resolved and chest tube was removed. Patients mental status improved and was alert and oriented x1-2 and spontaneously moves all extremities. Patient had an NG tube placed pending an official speech and swallow eval. Patient failed speech and swallow an a repeat speech and swallow was planned. Patient was then transferred to the floors for further management.         Second MICU admission on 2/21.   RRT initially called this AM for worsening tachypnea. Pt was evaluated at bedside breathing around 30s with belly breathing, hypoxic to 85% on 40% FIO2, increased to 100% with improvement in oxygen saturation to 97%. Cxray read as worsening pulm edema. ABG showed hypercapnia. Renal called for urgent HD. Also concern for aspiration PNA as increasing density of RLL.     During MICU admission patient treated with 5 days of vanc and zosyn, found to have MSSA bacteremia and MSSA in the sputum, now transitioned to cefazolin until 3/22 (post dialysis). Bacteremia has cleared since 2/22. Patient was extubated on 3/1 to nasal cannula, and is saturating well. She has marked amounts of secretions, being sunctioned now q3-4 hours, and assisted with metaneb, acapella and incentive spirometer. Patient is now hemodynamically stable for transfer to the RCU, and requires adequate respiratory care to ensure she does not aspirate again.      3/5 Recieved to the RCU

## 2018-03-06 NOTE — PROGRESS NOTE ADULT - SUBJECTIVE AND OBJECTIVE BOX
Chief Complaint/Follow-up on:   Subjective: Pt x-ferred to the RCU. She is not talking much today. Her  was at the bedside. He was concerned that 24 hour TF may be too much and contribute to her edema. He was also concerned that the HD would be permanent as his wife does not like it. Primary team is considering the option of a PEG.    MEDICATIONS  (STANDING):  ALBUTerol/ipratropium for Nebulization 3 milliLiter(s) Nebulizer every 8 hours  aspirin  chewable 81 milliGRAM(s) Oral daily  calamine Lotion 1 Application(s) Topical daily  carvedilol 6.25 milliGRAM(s) Oral every 12 hours  ceFAZolin   IVPB 2000 milliGRAM(s) IV Intermittent <User Schedule>  ceFAZolin   IVPB 3000 milliGRAM(s) IV Intermittent <User Schedule>  dextrose 5%. 1000 milliLiter(s) (50 mL/Hr) IV Continuous <Continuous>  dextrose 5%. 1000 milliLiter(s) (50 mL/Hr) IV Continuous <Continuous>  dextrose 5%. 1000 milliLiter(s) (50 mL/Hr) IV Continuous <Continuous>  dextrose 50% Injectable 12.5 Gram(s) IV Push once  dextrose 50% Injectable 25 Gram(s) IV Push once  dextrose 50% Injectable 25 Gram(s) IV Push once  epoetin odalis Injectable 26850 Unit(s) IV Push <User Schedule>  heparin  Injectable 5000 Unit(s) SubCutaneous every 8 hours  insulin lispro (HumaLOG) corrective regimen sliding scale   SubCutaneous every 6 hours  insulin NPH human recombinant 18 Unit(s) SubCutaneous <User Schedule>  insulin NPH human recombinant 15 Unit(s) SubCutaneous <User Schedule>  nystatin Powder 1 Application(s) Topical two times a day  sodium chloride 3%  Inhalation 3 milliLiter(s) Inhalation three times a day    MEDICATIONS  (PRN):  chlorhexidine 0.12% Liquid 15 milliLiter(s) Swish and Spit every 4 hours PRN mouth hygiene  dextrose Gel 1 Dose(s) Oral once PRN Blood Glucose LESS THAN 70 milliGRAM(s)/deciliter  glucagon  Injectable 1 milliGRAM(s) IntraMuscular once PRN Glucose LESS THAN 70 milligrams/deciliter  hydrocortisone 1% Ointment 1 Application(s) Topical every 8 hours PRN Rash and/or Itching  midodrine 10 milliGRAM(s) Oral every other day PRN prior to dialysis      PHYSICAL EXAM:  VITALS: T(C): 36.8 (03-06-18 @ 12:24)  T(F): 98.2 (03-06-18 @ 12:24), Max: 98.4 (03-06-18 @ 03:44)  HR: 100 (03-06-18 @ 12:24) (75 - 100)  BP: 153/77 (03-06-18 @ 12:24) (132/70 - 167/72)  RR:  (16 - 16)  SpO2:  (100% - 100%)  Wt(kg): --  GENERAL: NAD, well-groomed, well-developed  HEENT:  Atraumatic, Normocephalic, moist mucous membranes  RESPIRATORY: Clear to auscultation bilaterally; No rales, rhonchi, wheezing, or rubs  CARDIOVASCULAR: Regular rate and rhythm; No murmurs; no peripheral edema  GI: Soft, nontender, non distended, normal bowel sounds      POCT Blood Glucose.: 195 mg/dL (03-06-18 @ 11:58)  POCT Blood Glucose.: 171 mg/dL (03-06-18 @ 05:20)  POCT Blood Glucose.: 208 mg/dL (03-05-18 @ 23:56)  POCT Blood Glucose.: 187 mg/dL (03-05-18 @ 17:52)  POCT Blood Glucose.: 132 mg/dL (03-05-18 @ 11:53)  POCT Blood Glucose.: 229 mg/dL (03-05-18 @ 06:21)  POCT Blood Glucose.: 315 mg/dL (03-04-18 @ 23:57)  POCT Blood Glucose.: 239 mg/dL (03-04-18 @ 17:20)  POCT Blood Glucose.: 117 mg/dL (03-04-18 @ 11:17)  POCT Blood Glucose.: 206 mg/dL (03-04-18 @ 05:54)  POCT Blood Glucose.: 197 mg/dL (03-03-18 @ 23:53)  POCT Blood Glucose.: 229 mg/dL (03-03-18 @ 18:03)    03-06    141  |  101  |  72<H>  ----------------------------<  176<H>  3.7   |  25  |  4.81<H>    EGFR if : 10<L>  EGFR if non : 9<L>    Ca    9.4      03-06  Mg     2.9     03-06  Phos  5.6     03-06    TPro  6.8  /  Alb  2.5<L>  /  TBili  0.2  /  DBili  x   /  AST  26  /  ALT  <5<L>  /  AlkPhos  315<H>  03-06            Hemoglobin A1C, Whole Blood: 8.6 % <H> [4.0 - 5.6] (01-31-18 @ 07:15)  Hemoglobin A1C, Whole Blood: 8.7 % <H> [4.0 - 5.6] (01-31-18 @ 05:51)

## 2018-03-07 LAB
ALBUMIN SERPL ELPH-MCNC: 2.4 G/DL — LOW (ref 3.3–5)
ALP SERPL-CCNC: 321 U/L — HIGH (ref 40–120)
ALT FLD-CCNC: <5 U/L RC — LOW (ref 10–45)
ANION GAP SERPL CALC-SCNC: 13 MMOL/L — SIGNIFICANT CHANGE UP (ref 5–17)
APTT BLD: 28.4 SEC — SIGNIFICANT CHANGE UP (ref 27.5–37.4)
AST SERPL-CCNC: 27 U/L — SIGNIFICANT CHANGE UP (ref 10–40)
BILIRUB SERPL-MCNC: 0.2 MG/DL — SIGNIFICANT CHANGE UP (ref 0.2–1.2)
BUN SERPL-MCNC: 70 MG/DL — HIGH (ref 7–23)
CALCIUM SERPL-MCNC: 9.3 MG/DL — SIGNIFICANT CHANGE UP (ref 8.4–10.5)
CHLORIDE SERPL-SCNC: 101 MMOL/L — SIGNIFICANT CHANGE UP (ref 96–108)
CO2 SERPL-SCNC: 27 MMOL/L — SIGNIFICANT CHANGE UP (ref 22–31)
CREAT SERPL-MCNC: 4.46 MG/DL — HIGH (ref 0.5–1.3)
FERRITIN SERPL-MCNC: 174 NG/ML — HIGH (ref 15–150)
GLUCOSE BLDC GLUCOMTR-MCNC: 143 MG/DL — HIGH (ref 70–99)
GLUCOSE BLDC GLUCOMTR-MCNC: 154 MG/DL — HIGH (ref 70–99)
GLUCOSE BLDC GLUCOMTR-MCNC: 164 MG/DL — HIGH (ref 70–99)
GLUCOSE BLDC GLUCOMTR-MCNC: 179 MG/DL — HIGH (ref 70–99)
GLUCOSE SERPL-MCNC: 172 MG/DL — HIGH (ref 70–99)
HCT VFR BLD CALC: 22.7 % — LOW (ref 34.5–45)
HGB BLD-MCNC: 7.1 G/DL — LOW (ref 11.5–15.5)
INR BLD: 0.95 RATIO — SIGNIFICANT CHANGE UP (ref 0.88–1.16)
IRON SATN MFR SERPL: 13 % — LOW (ref 14–50)
IRON SATN MFR SERPL: 32 UG/DL — SIGNIFICANT CHANGE UP (ref 30–160)
MAGNESIUM SERPL-MCNC: 2.9 MG/DL — HIGH (ref 1.6–2.6)
MCHC RBC-ENTMCNC: 31 PG — SIGNIFICANT CHANGE UP (ref 27–34)
MCHC RBC-ENTMCNC: 31.1 GM/DL — LOW (ref 32–36)
MCV RBC AUTO: 99.8 FL — SIGNIFICANT CHANGE UP (ref 80–100)
PHOSPHATE SERPL-MCNC: 4.9 MG/DL — HIGH (ref 2.5–4.5)
PLATELET # BLD AUTO: 313 K/UL — SIGNIFICANT CHANGE UP (ref 150–400)
POTASSIUM SERPL-MCNC: 3.8 MMOL/L — SIGNIFICANT CHANGE UP (ref 3.5–5.3)
POTASSIUM SERPL-SCNC: 3.8 MMOL/L — SIGNIFICANT CHANGE UP (ref 3.5–5.3)
PROT SERPL-MCNC: 6.6 G/DL — SIGNIFICANT CHANGE UP (ref 6–8.3)
PROTHROM AB SERPL-ACNC: 10.3 SEC — SIGNIFICANT CHANGE UP (ref 9.8–12.7)
RBC # BLD: 2.28 M/UL — LOW (ref 3.8–5.2)
RBC # FLD: 16.8 % — HIGH (ref 10.3–14.5)
SODIUM SERPL-SCNC: 141 MMOL/L — SIGNIFICANT CHANGE UP (ref 135–145)
TIBC SERPL-MCNC: 248 UG/DL — SIGNIFICANT CHANGE UP (ref 220–430)
UIBC SERPL-MCNC: 216 UG/DL — SIGNIFICANT CHANGE UP (ref 110–370)
WBC # BLD: 6.2 K/UL — SIGNIFICANT CHANGE UP (ref 3.8–10.5)
WBC # FLD AUTO: 6.2 K/UL — SIGNIFICANT CHANGE UP (ref 3.8–10.5)

## 2018-03-07 PROCEDURE — 99497 ADVNCD CARE PLAN 30 MIN: CPT

## 2018-03-07 PROCEDURE — 99232 SBSQ HOSP IP/OBS MODERATE 35: CPT

## 2018-03-07 PROCEDURE — 99233 SBSQ HOSP IP/OBS HIGH 50: CPT | Mod: 25,GC

## 2018-03-07 PROCEDURE — 99233 SBSQ HOSP IP/OBS HIGH 50: CPT | Mod: GC

## 2018-03-07 RX ORDER — PANTOPRAZOLE SODIUM 20 MG/1
40 TABLET, DELAYED RELEASE ORAL DAILY
Qty: 0 | Refills: 0 | Status: DISCONTINUED | OUTPATIENT
Start: 2018-03-07 | End: 2018-03-11

## 2018-03-07 RX ORDER — ONDANSETRON 8 MG/1
4 TABLET, FILM COATED ORAL EVERY 6 HOURS
Qty: 0 | Refills: 0 | Status: DISCONTINUED | OUTPATIENT
Start: 2018-03-07 | End: 2018-03-07

## 2018-03-07 RX ORDER — ONDANSETRON 8 MG/1
4 TABLET, FILM COATED ORAL EVERY 6 HOURS
Qty: 0 | Refills: 0 | Status: DISCONTINUED | OUTPATIENT
Start: 2018-03-07 | End: 2018-03-16

## 2018-03-07 RX ADMIN — CARVEDILOL PHOSPHATE 6.25 MILLIGRAM(S): 80 CAPSULE, EXTENDED RELEASE ORAL at 17:47

## 2018-03-07 RX ADMIN — HUMAN INSULIN 18 UNIT(S): 100 INJECTION, SUSPENSION SUBCUTANEOUS at 17:47

## 2018-03-07 RX ADMIN — ONDANSETRON 4 MILLIGRAM(S): 8 TABLET, FILM COATED ORAL at 10:10

## 2018-03-07 RX ADMIN — HUMAN INSULIN 15 UNIT(S): 100 INJECTION, SUSPENSION SUBCUTANEOUS at 06:46

## 2018-03-07 RX ADMIN — NYSTATIN CREAM 1 APPLICATION(S): 100000 CREAM TOPICAL at 06:46

## 2018-03-07 RX ADMIN — HEPARIN SODIUM 5000 UNIT(S): 5000 INJECTION INTRAVENOUS; SUBCUTANEOUS at 21:05

## 2018-03-07 RX ADMIN — SODIUM CHLORIDE 3 MILLILITER(S): 9 INJECTION INTRAMUSCULAR; INTRAVENOUS; SUBCUTANEOUS at 21:52

## 2018-03-07 RX ADMIN — Medication 2: at 12:53

## 2018-03-07 RX ADMIN — Medication 3 MILLILITER(S): at 21:51

## 2018-03-07 RX ADMIN — CARVEDILOL PHOSPHATE 6.25 MILLIGRAM(S): 80 CAPSULE, EXTENDED RELEASE ORAL at 08:50

## 2018-03-07 RX ADMIN — Medication 81 MILLIGRAM(S): at 11:50

## 2018-03-07 RX ADMIN — HUMAN INSULIN 18 UNIT(S): 100 INJECTION, SUSPENSION SUBCUTANEOUS at 12:53

## 2018-03-07 RX ADMIN — CALAMINE 8% AND ZINC OXIDE 8% 1 APPLICATION(S): 160 LOTION TOPICAL at 11:50

## 2018-03-07 RX ADMIN — Medication 3 MILLILITER(S): at 13:08

## 2018-03-07 RX ADMIN — HEPARIN SODIUM 5000 UNIT(S): 5000 INJECTION INTRAVENOUS; SUBCUTANEOUS at 06:45

## 2018-03-07 RX ADMIN — HEPARIN SODIUM 5000 UNIT(S): 5000 INJECTION INTRAVENOUS; SUBCUTANEOUS at 13:18

## 2018-03-07 RX ADMIN — Medication 2: at 06:46

## 2018-03-07 RX ADMIN — PANTOPRAZOLE SODIUM 40 MILLIGRAM(S): 20 TABLET, DELAYED RELEASE ORAL at 11:49

## 2018-03-07 RX ADMIN — NYSTATIN CREAM 1 APPLICATION(S): 100000 CREAM TOPICAL at 17:47

## 2018-03-07 NOTE — PROGRESS NOTE ADULT - ATTENDING COMMENTS
RUSS- anuric ATN- no major sign of recovery on labs despite possible increase in UO per ; plan HD today  access- radha odom- will need permcath once ID has given clearance per med team  anemia ckd-epo tiw #RUSS- anuric  ATN-  increase in UO per /daughter; patient received 37 mins hd yesterday; lytes stable and creatinine decrease could be due to HD. continue to monitor cr trend for recovery; please check early morning AM labs on 3/8/18; if cr decreases may not need permcath/HD  #access- has shiley- will need permcath depending on labs;   #anemia -epo tiw; change to SC; check iron studies  #infection/bacteremia- on dialysis dosing for cefazolin; may need to be changed if recovery; will determine based on creatinine tomorroe

## 2018-03-07 NOTE — PROGRESS NOTE ADULT - PROBLEM SELECTOR PLAN 1
-test BG Q6h  -c/w NPH 15 units Q 6am (hold when tube feeds off)  -c/w NPH 18 units (12am, 12pm, 6pm-hold when tube feeds off)  -c/w Humalog moderate correction scale Q6h  -Please hold 12am and 6am NPH doses while NPO today  -discussed w/Pt's  and team  pager: [phone]

## 2018-03-07 NOTE — PROGRESS NOTE ADULT - SUBJECTIVE AND OBJECTIVE BOX
Follow Up:      Interval History/ROS: lethargic, no distress    Allergies  doxycycline (Rash)  isoniazid (Rash)  NIFEdipine (Urticaria; Hives)  vitamin E (Short breath; Urticaria; Hives)    ANTIMICROBIALS:  ceFAZolin   IVPB 2000 <User Schedule>  ceFAZolin   IVPB 3000 <User Schedule>    OTHER MEDS:  MEDICATIONS  (STANDING):  ALBUTerol/ipratropium for Nebulization 3 every 8 hours  aspirin  chewable 81 daily  carvedilol 6.25 every 12 hours  dextrose 50% Injectable 12.5 once  dextrose 50% Injectable 25 once  dextrose 50% Injectable 25 once  dextrose Gel 1 once PRN  epoetin odalis Injectable 01257 <User Schedule>  glucagon  Injectable 1 once PRN  heparin  Injectable 5000 every 8 hours  insulin lispro (HumaLOG) corrective regimen sliding scale  every 6 hours  insulin NPH human recombinant 18 <User Schedule>  insulin NPH human recombinant 15 <User Schedule>  midodrine 10 every other day PRN  ondansetron Injectable 4 every 6 hours PRN  pantoprazole  Injectable 40 daily  sodium chloride 3%  Inhalation 3 three times a day    Vital Signs Last 24 Hrs  T(C): 36.4 (07 Mar 2018 12:53), Max: 37.2 (06 Mar 2018 17:37)  T(F): 97.5 (07 Mar 2018 12:53), Max: 99 (06 Mar 2018 17:37)  HR: 90 (07 Mar 2018 12:53) (90 - 107)  BP: 169/65 (07 Mar 2018 12:53) (132/59 - 170/58)  BP(mean): --  RR: 18 (07 Mar 2018 12:53) (16 - 20)  SpO2: 100% (07 Mar 2018 12:53) (98% - 100%)    PHYSICAL EXAM:  General: WN/WD NAD, Non-toxic  Neurology: A&Ox3, nonfocal  Respiratory: Clear to auscultation bilaterally anterior chest  CV: RRR, S1S2, no murmurs, rubs or gallops  Abdominal: Soft, Non-tender, non-distended  Line Sites: Clear right subclavian site  Skin: No rash               7.1    6.2   )-----------( 313      ( 07 Mar 2018 06:31 )             22.7       03-07    141  |  101  |  70<H>  ----------------------------<  172<H>  3.8   |  27  |  4.46<H>    Ca    9.3      07 Mar 2018 06:31  Phos  4.9     03-07  Mg     2.9     03-07    TPro  6.6  /  Alb  2.4<L>  /  TBili  0.2  /  DBili  x   /  AST  27  /  ALT  <5<L>  /  AlkPhos  321<H>  03-07          MICROBIOLOGY:  .Blood Blood-Venous  02-27-18   No growth at 5 days.  --  --      .Blood Blood-Peripheral  02-24-18   No growth at 5 days.  --  --      .Sputum Sputum trap  02-22-18   Numerous Staphylococcus aureus ***********Note************  This isolate demonstrates inducible  clindamycin resistance.  Clindamycin may still be effective in some patients.  No Normal Respiratory Cate present  --  Staphylococcus aureus      .Blood Blood  02-22-18   Growth in anaerobic bottle: Staphylococcus aureus  ***********Note************  This isolate demonstrates inducible  clindamycin resistance.  Clindamycin may still be effective in some patients.  "Due to technical problems, Proteus sp. will Not be reported as part of  the BCID panel until further notice"  ***Blood Panel PCR results on this specimen are available  approximately 3 hours after the Gram stain result.***  Gram stain, PCR, and/or culture results may not always  correspond due to difference in methodologies.  ************************************************************  This PCR assay was performed using [employer].  The following targets are tested for: Enterococcus,  vancomycin resistant enterococci, Listeria monocytogenes,  coagulase negative staphylococci, S. aureus,  methicillin resistant S. aureus, Streptococcus agalactiae  (Group B), S. pneumoniae, S. pyogenes (Group A),  Acinetobacter baumannii, Enterobacter cloacae, E. coli,  Klebsiella oxytoca, K. pneumoniae, Proteus sp.,  Serratia marcescens, Haemophilus influenzae,  Neisseria meningitidis, Pseudomonas aeruginosa, Candida  albicans, C. glabrata, C krusei, C parapsilosis,  C. tropicalis and the KPC resistance gene.  --  Blood Culture PCR  Staphylococcus aureus      .Blood Blood  02-22-18   No growth at 5 days.  --  --      .Blood Blood-Peripheral  02-19-18   No growth at 5 days.  --  --          Jas Durand MD; Division of Infectious Disease; Pager: [phone]; nights and weekends: [phone]

## 2018-03-07 NOTE — CHART NOTE - NSCHARTNOTEFT_GEN_A_CORE
Patient seen for nutrition consult and follow-up assessment on RCU  68 yo female with PMH of HTN, T2DM, Breast CA with complicated medical course including MICU admission x2, acute respiratory failure, and new ESRD requiring HD. Per chart, patient extubated 3/1, last HD 3/3 (attempted 3/6 but catheter not functioning) with ?plan for permacath 3/7 with IR, and possible speech/swallow eval in the next few days.    Source: Patient [ ]    Family [x]     other [x- medical record; RN; NP]    Diet : NPO with tube feeds  Enteral Nutrition: Nepro @ 35cc/hr x 24 hrs to provide 840cc fluid, 1512cal/d (22cal/Kg current wt), and 68gm prot/d (1.3gm prot/Kg IBW) based upon current weight 68.7Kg and IBW 52.3Kg.  Patient seen at bedside with tube feeds of Nepro infusing @ goal rate of 35cc/hr. Per discussion with daughter, concern over which formula (Vital AF vs Nepro) is appropriate for patient as she has received both during this admission. Discussed differences between tube feeding formulas and assured daughter she is receiving appropriate feeding regimen given patient receiving HD treatments.  expressed concern over patient's DM and Nepro- assured  Nepro (with Carb Steady) is appropriate for individuals with PMH of DM.  Per chart, patient received 765cc Nepro on 3/6 (91% of goal intake). Per discussion with RN, patient tolerating feeds with no GI distress of note and last BM on 3/7 per chart. Per discussion with NP, RD to evaluate adequacy of current diet order. See recommendations below.    Current Weight: Weight 3/6 of 151.4 pounds  Weight Change: Highly variable weight history since admission noted/as follows: Admit weight 1/30 164.9 pounds, Pre-HD wt 3/3 166.4 pounds, Wt 3/4 151.4 pounds, Wt 3/6 151.4 pounds  Weight fluctuations likely secondary to fluids shifts vs weight loss given patient oligo-anuric receiving HD treatments. Continue to monitor weights    Pertinent Medications: MEDICATIONS  (STANDING):  ALBUTerol/ipratropium for Nebulization 3 milliLiter(s) Nebulizer every 8 hours  aspirin  chewable 81 milliGRAM(s) Oral daily  calamine Lotion 1 Application(s) Topical daily  carvedilol 6.25 milliGRAM(s) Oral every 12 hours  ceFAZolin   IVPB 2000 milliGRAM(s) IV Intermittent <User Schedule>  ceFAZolin   IVPB 3000 milliGRAM(s) IV Intermittent <User Schedule>  dextrose 5%. 1000 milliLiter(s) (50 mL/Hr) IV Continuous <Continuous>  dextrose 5%. 1000 milliLiter(s) (50 mL/Hr) IV Continuous <Continuous>  dextrose 5%. 1000 milliLiter(s) (50 mL/Hr) IV Continuous <Continuous>  dextrose 50% Injectable 12.5 Gram(s) IV Push once  dextrose 50% Injectable 25 Gram(s) IV Push once  dextrose 50% Injectable 25 Gram(s) IV Push once  epoetin odalis Injectable 51111 Unit(s) IV Push <User Schedule>  heparin  Injectable 5000 Unit(s) SubCutaneous every 8 hours  insulin lispro (HumaLOG) corrective regimen sliding scale   SubCutaneous every 6 hours  insulin NPH human recombinant 18 Unit(s) SubCutaneous <User Schedule>  insulin NPH human recombinant 15 Unit(s) SubCutaneous <User Schedule>  nystatin Powder 1 Application(s) Topical two times a day  sodium chloride 3%  Inhalation 3 milliLiter(s) Inhalation three times a day    MEDICATIONS  (PRN):  chlorhexidine 0.12% Liquid 15 milliLiter(s) Swish and Spit every 4 hours PRN mouth hygiene  dextrose Gel 1 Dose(s) Oral once PRN Blood Glucose LESS THAN 70 milliGRAM(s)/deciliter  glucagon  Injectable 1 milliGRAM(s) IntraMuscular once PRN Glucose LESS THAN 70 milligrams/deciliter  hydrocortisone 1% Ointment 1 Application(s) Topical every 8 hours PRN Rash and/or Itching  midodrine 10 milliGRAM(s) Oral every other day PRN prior to dialysis    Pertinent Labs:  (3/7) BUN 70H, Cr 4.46H, BG 172H, Alb 2.4L, Alk Phos 321H, ALT <5L, Mg 2.9H, Phos 4.9H, POCT ; (2/6) POCT -208    Skin: 2+ generalized edema; +skin teats; no pressure injuries noted    Estimated Needs:   [x] no change since previous assessment  [ ] recalculated:       Previous Nutrition Diagnosis:      [x] Increased Nutrient Needs          Nutrition Diagnosis is [x] ongoing, addressed with provision of enteral feeds         New Nutrition Diagnosis: [x] not applicable      Recommend    1) Recommend increase Nepro @ 40cc/hr x 24 hrs to provide 960cc fluid, 1728cal/d (25cal/Kg current weight), and 78gm prot/d (1.5gm prot/Kg IBW). Calculations based upon current weight 68.7Kg and IBW 52.3Kg.   2) Defer total fluids to medical team       Monitoring and Evaluation:     [ ] PO intake [x] Tolerance to diet prescription [x] weights [x] follow up per protocol    [x] other: RD remains available, Beth Fish RD Pager #[phone]

## 2018-03-07 NOTE — PROGRESS NOTE ADULT - ASSESSMENT
67F PMH HTN, DMT2 (70/30 TDD: 90 on admission), Breast Cancer (diagnosed in Feb 2017) currently on Herceptin (last dose Jan 20th), originally presented 1/30 with Fever found to have influenza subsequently went into PEA arrest x 2 with ROSC, complicated by bilateral pneumothoraces s/p 3 chest tubes and paracentesis decompression of the abdomen, then found to have PE s/p tPA 1/30, also s/p new CVA, and also new ESRD requiring almost daily HD.     First MICU admission:  Patient was admitted to MICU for further management. Patient was found to have acute renal failure and shock liver. Nephrology was consulted and patient was started on dialysis. Patient was started on Tamiflu for flu and zosyn for possible aspiration pneumonia. Patients chest tubes and peritoneal drain were removed. Patient redeveloped a pneumothorax on the L side and chest tube was reinserted. 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 Urine legionella was negative and azithromycin was discontinued. Patient continued to be anuric and continued to receive dialysis. Shock liver resolved. Patient was extubated on 2/8/17. Patient completed a 5 day course of tamiflu and 7 day course of zosyn and did not show signs of infection. Patients pneumothorax on L side resolved and chest tube was removed. Patients mental status improved and was alert and oriented x1-2 and spontaneously moves all extremities. Patient had an NG tube placed pending an official speech and swallow eval. Patient failed speech and swallow an a repeat speech and swallow was planned. Patient was then transferred to the floors for further management.         Second MICU admission on 2/21.   RRT initially called this AM for worsening tachypnea. Pt was evaluated at bedside breathing around 30s with belly breathing, hypoxic to 85% on 40% FIO2, increased to 100% with improvement in oxygen saturation to 97%. Cxray read as worsening pulm edema. ABG showed hypercapnia. Renal called for urgent HD. Also concern for aspiration PNA as increasing density of RLL.     During MICU admission patient treated with 5 days of vanc and zosyn, found to have MSSA bacteremia and MSSA in the sputum, now transitioned to cefazolin until 3/22 (post dialysis). Bacteremia has cleared since 2/22. Patient was extubated on 3/1 to nasal cannula, and is saturating well. She has marked amounts of secretions, being sunctioned now q3-4 hours, and assisted with metaneb, acapella and incentive spirometer. Patient is now hemodynamically stable for transfer to the RCU, and requires adequate respiratory care to ensure she does not aspirate again.      3/5 Recieved to the RCU  3/6 HD lililey nonfunctional. LABS remain stable today. On for permacath on 3/8 with IR

## 2018-03-07 NOTE — PROGRESS NOTE ADULT - SUBJECTIVE AND OBJECTIVE BOX
Patient is a 68y old  Female who presents with a chief complaint of Fever, total body weakness (02 Feb 2018 13:44)      Interval Events:    REVIEW OF SYSTEMS:  [ ] Positive  [ ] All other systems negative  [x ] Unable to assess ROS because ________    Vital Signs Last 24 Hrs  T(C): 36.8 (03-07-18 @ 06:02), Max: 37.2 (03-06-18 @ 17:37)  T(F): 98.2 (03-07-18 @ 06:02), Max: 99 (03-06-18 @ 17:37)  HR: 101 (03-07-18 @ 08:49) (90 - 107)  BP: 153/73 (03-07-18 @ 08:49) (132/59 - 170/58)  RR: 20 (03-07-18 @ 06:02) (16 - 20)  SpO2: 98% (03-07-18 @ 06:02) (98% - 100%)    PHYSICAL EXAM:  HEENT:   [ ]Tracheostomy:n/a  [ ]Pupils equal  [ ]No oral lesions  [ ]Abnormal    SKIN  [x ]No Rash  [ ] Abnormal  [ ] pressure    CARDIAC  [ x]Regular  [ ]Abnormal    PULMONARY  [x ]Bilateral Clear Breath Sounds  [ ]Normal Excursion  [ ]Abnormal    GI  [ ]PEG      [x ] +BS	[x] kaofeed	              [x ]Soft, nondistended, nontender	  [ ]Abnormal    MUSCULOSKELETAL                                   [ ]Bedbound                 [ ]Abnormal    [ x]Ambulatory/OOB to chair                           EXTREMITIES                                         [x ]Normal  [ ]Edema                           NEUROLOGIC  [ ] Normal, non focal  [x ] Focal findings: right foot weakness    PSYCHIATRIC  [x ]Alert and appropriate  [ ] Sedated	 [ ]Agitated    :  Wheeler: [ ] Yes, if yes: Date of Placement:                   [x  ] No    LINES: Central Lines [ ] Yes, if yes: Date of Placement Mediport and HD shiley catheter                                     [ ] No    HOSPITAL MEDICATIONS:  MEDICATIONS  (STANDING):  ALBUTerol/ipratropium for Nebulization 3 milliLiter(s) Nebulizer every 8 hours  aspirin  chewable 81 milliGRAM(s) Oral daily  calamine Lotion 1 Application(s) Topical daily  carvedilol 6.25 milliGRAM(s) Oral every 12 hours  ceFAZolin   IVPB 2000 milliGRAM(s) IV Intermittent <User Schedule>  ceFAZolin   IVPB 3000 milliGRAM(s) IV Intermittent <User Schedule>  dextrose 5%. 1000 milliLiter(s) (50 mL/Hr) IV Continuous <Continuous>  dextrose 5%. 1000 milliLiter(s) (50 mL/Hr) IV Continuous <Continuous>  dextrose 5%. 1000 milliLiter(s) (50 mL/Hr) IV Continuous <Continuous>  dextrose 50% Injectable 12.5 Gram(s) IV Push once  dextrose 50% Injectable 25 Gram(s) IV Push once  dextrose 50% Injectable 25 Gram(s) IV Push once  epoetin odalis Injectable 43063 Unit(s) IV Push <User Schedule>  heparin  Injectable 5000 Unit(s) SubCutaneous every 8 hours  insulin lispro (HumaLOG) corrective regimen sliding scale   SubCutaneous every 6 hours  insulin NPH human recombinant 18 Unit(s) SubCutaneous <User Schedule>  insulin NPH human recombinant 15 Unit(s) SubCutaneous <User Schedule>  nystatin Powder 1 Application(s) Topical two times a day  pantoprazole  Injectable 40 milliGRAM(s) IV Push daily  sodium chloride 3%  Inhalation 3 milliLiter(s) Inhalation three times a day    MEDICATIONS  (PRN):  chlorhexidine 0.12% Liquid 15 milliLiter(s) Swish and Spit every 4 hours PRN mouth hygiene  dextrose Gel 1 Dose(s) Oral once PRN Blood Glucose LESS THAN 70 milliGRAM(s)/deciliter  glucagon  Injectable 1 milliGRAM(s) IntraMuscular once PRN Glucose LESS THAN 70 milligrams/deciliter  hydrocortisone 1% Ointment 1 Application(s) Topical every 8 hours PRN Rash and/or Itching  midodrine 10 milliGRAM(s) Oral every other day PRN prior to dialysis  ondansetron Injectable 4 milliGRAM(s) IV Push every 6 hours PRN Nausea and/or Vomiting      LABS:                        7.1    6.2   )-----------( 313      ( 07 Mar 2018 06:31 )             22.7     03-07    141  |  101  |  70<H>  ----------------------------<  172<H>  3.8   |  27  |  4.46<H>    Ca    9.3      07 Mar 2018 06:31  Phos  4.9     03-07  Mg     2.9     03-07    TPro  6.6  /  Alb  2.4<L>  /  TBili  0.2  /  DBili  x   /  AST  27  /  ALT  <5<L>  /  AlkPhos  321<H>  03-07    PT/INR - ( 07 Mar 2018 06:30 )   PT: 10.3 sec;   INR: 0.95 ratio         PTT - ( 07 Mar 2018 06:30 )  PTT:28.4 sec        CAPILLARY BLOOD GLUCOSE    MICROBIOLOGY:     RADIOLOGY:  [ ] Reviewed and interpreted by me Patient is a 68y old  Female who presents with a chief complaint of Fever, total body weakness (02 Feb 2018 13:44)      Interval Events: Did not have HD session yesterday due to Shiley being clogged - did not respond to cathflo  Felt nauseous this AM - spit up a little bile    REVIEW OF SYSTEMS:  [ ] Positive  [ ] All other systems negative  [x ] Unable to assess ROS because ________    Vital Signs Last 24 Hrs  T(C): 36.8 (03-07-18 @ 06:02), Max: 37.2 (03-06-18 @ 17:37)  T(F): 98.2 (03-07-18 @ 06:02), Max: 99 (03-06-18 @ 17:37)  HR: 101 (03-07-18 @ 08:49) (90 - 107)  BP: 153/73 (03-07-18 @ 08:49) (132/59 - 170/58)  RR: 20 (03-07-18 @ 06:02) (16 - 20)  SpO2: 98% (03-07-18 @ 06:02) (98% - 100%)    PHYSICAL EXAM:  HEENT:   [x ]Tracheostomy:n/a  [ ]Pupils equal  [ x]No oral lesions  [ ]Abnormal    SKIN  [x ]No Rash  [ ] Abnormal  [ ] pressure    CARDIAC  [ x]Regular  [ ]Abnormal    PULMONARY  [x ]Bilateral Clear Breath Sounds  [ ]Normal Excursion  [ ]Abnormal    GI  [ ]PEG      [x ] +BS	[x] kaofeed	              [x ]Soft, nondistended, nontender	  [ ]Abnormal    MUSCULOSKELETAL                                   [ ]Bedbound                 [ ]Abnormal    [ x]Ambulatory/OOB to chair                           EXTREMITIES                                         [x ]Normal  [ ]Edema                           NEUROLOGIC  [ ] Normal, non focal  [x ] Focal findings: right foot weakness    PSYCHIATRIC  [x ]Alert and appropriate  [ ] Sedated	 [ ]Agitated    :  Wheeler: [ ] Yes, if yes: Date of Placement:                   [x  ] No    LINES: Central Lines [ ] Yes, if yes: Date of Placement Mediport and HD shiley catheter                                     [ ] No    HOSPITAL MEDICATIONS:  MEDICATIONS  (STANDING):  ALBUTerol/ipratropium for Nebulization 3 milliLiter(s) Nebulizer every 8 hours  aspirin  chewable 81 milliGRAM(s) Oral daily  calamine Lotion 1 Application(s) Topical daily  carvedilol 6.25 milliGRAM(s) Oral every 12 hours  ceFAZolin   IVPB 2000 milliGRAM(s) IV Intermittent <User Schedule>  ceFAZolin   IVPB 3000 milliGRAM(s) IV Intermittent <User Schedule>  dextrose 5%. 1000 milliLiter(s) (50 mL/Hr) IV Continuous <Continuous>  dextrose 5%. 1000 milliLiter(s) (50 mL/Hr) IV Continuous <Continuous>  dextrose 5%. 1000 milliLiter(s) (50 mL/Hr) IV Continuous <Continuous>  dextrose 50% Injectable 12.5 Gram(s) IV Push once  dextrose 50% Injectable 25 Gram(s) IV Push once  dextrose 50% Injectable 25 Gram(s) IV Push once  epoetin odalis Injectable 02148 Unit(s) IV Push <User Schedule>  heparin  Injectable 5000 Unit(s) SubCutaneous every 8 hours  insulin lispro (HumaLOG) corrective regimen sliding scale   SubCutaneous every 6 hours  insulin NPH human recombinant 18 Unit(s) SubCutaneous <User Schedule>  insulin NPH human recombinant 15 Unit(s) SubCutaneous <User Schedule>  nystatin Powder 1 Application(s) Topical two times a day  pantoprazole  Injectable 40 milliGRAM(s) IV Push daily  sodium chloride 3%  Inhalation 3 milliLiter(s) Inhalation three times a day    MEDICATIONS  (PRN):  chlorhexidine 0.12% Liquid 15 milliLiter(s) Swish and Spit every 4 hours PRN mouth hygiene  dextrose Gel 1 Dose(s) Oral once PRN Blood Glucose LESS THAN 70 milliGRAM(s)/deciliter  glucagon  Injectable 1 milliGRAM(s) IntraMuscular once PRN Glucose LESS THAN 70 milligrams/deciliter  hydrocortisone 1% Ointment 1 Application(s) Topical every 8 hours PRN Rash and/or Itching  midodrine 10 milliGRAM(s) Oral every other day PRN prior to dialysis  ondansetron Injectable 4 milliGRAM(s) IV Push every 6 hours PRN Nausea and/or Vomiting      LABS:                        7.1    6.2   )-----------( 313      ( 07 Mar 2018 06:31 )             22.7     03-07    141  |  101  |  70<H>  ----------------------------<  172<H>  3.8   |  27  |  4.46<H>    Ca    9.3      07 Mar 2018 06:31  Phos  4.9     03-07  Mg     2.9     03-07    TPro  6.6  /  Alb  2.4<L>  /  TBili  0.2  /  DBili  x   /  AST  27  /  ALT  <5<L>  /  AlkPhos  321<H>  03-07    PT/INR - ( 07 Mar 2018 06:30 )   PT: 10.3 sec;   INR: 0.95 ratio         PTT - ( 07 Mar 2018 06:30 )  PTT:28.4 sec        CAPILLARY BLOOD GLUCOSE    MICROBIOLOGY:     RADIOLOGY:  [ ] Reviewed and interpreted by me

## 2018-03-07 NOTE — PROGRESS NOTE ADULT - SUBJECTIVE AND OBJECTIVE BOX
Diabetes Follow up note:  Interval Hx:  67 year old female w/uncontrolled T2DM w/complications here w/Flu c/b PEA arrest and Acute respiratory failure. Pt remains on continuous tube feeds, now increase Nepro @ 40 cc/hr. Tolerating at goal. Per team, NPO tonight after midnight for Permacath tomorrow.  at bedside. BG values at goal on present insulin regimen.     Review of Systems:  General: No complaints.   GI: Tolerating TFs without any N/V/D/ABD PAIN.  CV: No CP/SOB  ENDO: No S&Sx of hypoglycemia  MEDS:    insulin lispro (HumaLOG) corrective regimen sliding scale   SubCutaneous every 6 hours  insulin NPH human recombinant 18 Unit(s) SubCutaneous <User Schedule>  insulin NPH human recombinant 15 Unit(s) SubCutaneous <User Schedule>    ceFAZolin   IVPB 2000 milliGRAM(s) IV Intermittent <User Schedule>  ceFAZolin   IVPB 3000 milliGRAM(s) IV Intermittent <User Schedule>    Allergies    doxycycline (Rash)  isoniazid (Rash)  NIFEdipine (Urticaria; Hives)  vitamin E (Short breath; Urticaria; Hives)          PE:  General: Female lying in bed. NAD.  Vital Signs Last 24 Hrs  T(C): 36.4 (07 Mar 2018 12:53), Max: 37.2 (06 Mar 2018 17:37)  T(F): 97.5 (07 Mar 2018 12:53), Max: 99 (06 Mar 2018 17:37)  HR: 90 (07 Mar 2018 12:53) (90 - 107)  BP: 169/65 (07 Mar 2018 12:53) (132/59 - 170/58)  BP(mean): --  RR: 18 (07 Mar 2018 12:53) (16 - 20)  SpO2: 100% (07 Mar 2018 12:53) (98% - 100%)  HEENT: NGT in place.  Resp: + rhonchi bilat anterior.   Abd: Soft, NT,ND,   Extremities: Warm. Trace pedal edema  Neuro: A&O X3    LABS:    POCT Blood Glucose.: 154 mg/dL (03-07-18 @ 12:25)  POCT Blood Glucose.: 164 mg/dL (03-07-18 @ 05:55)  POCT Blood Glucose.: 117 mg/dL (03-06-18 @ 23:45)  POCT Blood Glucose.: 102 mg/dL (03-06-18 @ 17:39)  POCT Blood Glucose.: 195 mg/dL (03-06-18 @ 11:58)  POCT Blood Glucose.: 171 mg/dL (03-06-18 @ 05:20)  POCT Blood Glucose.: 208 mg/dL (03-05-18 @ 23:56)  POCT Blood Glucose.: 187 mg/dL (03-05-18 @ 17:52)  POCT Blood Glucose.: 132 mg/dL (03-05-18 @ 11:53)  POCT Blood Glucose.: 229 mg/dL (03-05-18 @ 06:21)  POCT Blood Glucose.: 315 mg/dL (03-04-18 @ 23:57)  POCT Blood Glucose.: 239 mg/dL (03-04-18 @ 17:20)                            7.1    6.2   )-----------( 313      ( 07 Mar 2018 06:31 )             22.7       03-07    141  |  101  |  70<H>  ----------------------------<  172<H>  3.8   |  27  |  4.46<H>    Ca    9.3      07 Mar 2018 06:31  Phos  4.9     03-07  Mg     2.9     03-07    TPro  6.6  /  Alb  2.4<L>  /  TBili  0.2  /  DBili  x   /  AST  27  /  ALT  <5<L>  /  AlkPhos  321<H>  03-07          Hemoglobin A1C, Whole Blood: 8.6 % <H> [4.0 - 5.6] (01-31-18 @ 07:15)  Hemoglobin A1C, Whole Blood: 8.7 % <H> [4.0 - 5.6] (01-31-18 @ 05:51)            Contact number: marcelino [phone] or [phone]

## 2018-03-07 NOTE — PROGRESS NOTE ADULT - PROBLEM SELECTOR PLAN 1
from [facility] in setting of cardiac arrest. Pt continues to be oligo-anuric, requiring dialysis. No evidence of renal recovery at present. HD session was not completed yesterday as non tunneled catheter was not working. Patient scheduled for tunneled HD catheter placement  by IR tomorrow. Labs reviewed from today; no plan for HD today. Monitor BMP daily.

## 2018-03-07 NOTE — PROGRESS NOTE ADULT - SUBJECTIVE AND OBJECTIVE BOX
[facility] Division of Kidney Diseases & Hypertension  FOLLOW UP NOTE  [phone]--------------------------------------------------------------------------------  Chief Complaint:Cardiac arrest      24 hour events/subjective:    No acute events noted  Patient unable to complete HD yesterday as HD catheter was not working  Schedule for tunneled HD catheter placement tomorrow.     PAST HISTORY  --------------------------------------------------------------------------------  No significant changes to PMH, PSH, FHx, SHx, unless otherwise noted    ALLERGIES & MEDICATIONS  --------------------------------------------------------------------------------  Allergies    doxycycline (Rash)  isoniazid (Rash)  NIFEdipine (Urticaria; Hives)  vitamin E (Short breath; Urticaria; Hives)    Intolerances      Standing Inpatient Medications  ALBUTerol/ipratropium for Nebulization 3 milliLiter(s) Nebulizer every 8 hours  aspirin  chewable 81 milliGRAM(s) Oral daily  calamine Lotion 1 Application(s) Topical daily  carvedilol 6.25 milliGRAM(s) Oral every 12 hours  ceFAZolin   IVPB 2000 milliGRAM(s) IV Intermittent <User Schedule>  ceFAZolin   IVPB 3000 milliGRAM(s) IV Intermittent <User Schedule>  dextrose 5%. 1000 milliLiter(s) IV Continuous <Continuous>  dextrose 5%. 1000 milliLiter(s) IV Continuous <Continuous>  dextrose 5%. 1000 milliLiter(s) IV Continuous <Continuous>  dextrose 50% Injectable 12.5 Gram(s) IV Push once  dextrose 50% Injectable 25 Gram(s) IV Push once  dextrose 50% Injectable 25 Gram(s) IV Push once  epoetin odalis Injectable 55187 Unit(s) IV Push <User Schedule>  heparin  Injectable 5000 Unit(s) SubCutaneous every 8 hours  insulin lispro (HumaLOG) corrective regimen sliding scale   SubCutaneous every 6 hours  insulin NPH human recombinant 18 Unit(s) SubCutaneous <User Schedule>  insulin NPH human recombinant 15 Unit(s) SubCutaneous <User Schedule>  nystatin Powder 1 Application(s) Topical two times a day  sodium chloride 3%  Inhalation 3 milliLiter(s) Inhalation three times a day    PRN Inpatient Medications  chlorhexidine 0.12% Liquid 15 milliLiter(s) Swish and Spit every 4 hours PRN  dextrose Gel 1 Dose(s) Oral once PRN  glucagon  Injectable 1 milliGRAM(s) IntraMuscular once PRN  hydrocortisone 1% Ointment 1 Application(s) Topical every 8 hours PRN  midodrine 10 milliGRAM(s) Oral every other day PRN      REVIEW OF SYSTEMS  --------------------------------------------------------------------------------  Gen: No  fevers/chills  Skin: No rashes  Head/Eyes/Ears/Mouth: No headache; Normal hearing; Normal vision w/o blurriness  Respiratory: No dyspnea, cough, wheezing, hemoptysis  CV: No chest pain, PND, orthopnea  GI: No abdominal pain, diarrhea, constipation, nausea, vomiting  : No increased frequency, dysuria, hematuria, nocturia  MSK: No joint pain/swelling; no back pain; no edema  Neuro: No dizziness/lightheadedness, weakness, seizures, numbness, tingling      All other systems were reviewed and are negative, except as noted.    VITALS/PHYSICAL EXAM  --------------------------------------------------------------------------------  T(C): 36.8 (03-07-18 @ 06:02), Max: 37.2 (03-06-18 @ 17:37)  HR: 94 (03-07-18 @ 06:02) (90 - 107)  BP: 151/70 (03-07-18 @ 06:02) (132/59 - 170/58)  RR: 20 (03-07-18 @ 06:02) (16 - 20)  SpO2: 98% (03-07-18 @ 06:02) (98% - 100%)  Wt(kg): --        03-06-18 @ 07:01  -  03-07-18 @ 07:00  --------------------------------------------------------  IN: 1665 mL / OUT: 397 mL / NET: 1268 mL      Physical Exam:  	  Gen: NAD  	HEENT: PERRL, supple neck, clear oropharynx  	Pulm: CTA B/L  	CV: RRR, S1S2;  	Back: No spinal or CVA tenderness  	Abd: +BS, soft, nontender/nondistended  	: No suprapubic tenderness              Extremities: no bilateral LE edema noted.               Neuro: Awake   	Skin: Warm and dry   	Vascular access: Right IJ non tunneled HD catheter present.       LABS/STUDIES  --------------------------------------------------------------------------------              7.1    6.2   >-----------<  313      [03-07-18 @ 06:31]              22.7     141  |  101  |  70  ----------------------------<  172      [03-07-18 @ 06:31]  3.8   |  27  |  4.46        Ca     9.3     [03-07-18 @ 06:31]      Mg     2.9     [03-07-18 @ 06:31]      Phos  4.9     [03-07-18 @ 06:31]    TPro  6.6  /  Alb  2.4  /  TBili  0.2  /  DBili  x   /  AST  27  /  ALT  x   /  AlkPhos  321  [03-07-18 @ 06:31]    PT/INR: PT 10.3 , INR 0.95       [03-07-18 @ 06:30]  PTT: 28.4       [03-07-18 @ 06:30]      Creatinine Trend:  SCr 4.46 [03-07 @ 06:31]  SCr 4.81 [03-06 @ 05:58]  SCr 3.98 [03-05 @ 00:25]  SCr 3.12 [03-04 @ 00:22]  SCr 4.36 [03-02 @ 23:38]          HBsAb <3.0      [02-28-18 @ 20:23]  HBsAg Nonreact      [02-28-18 @ 20:23]  HCV 0.24, Nonreact      [02-28-18 @ 20:23]

## 2018-03-07 NOTE — PROGRESS NOTE ADULT - ASSESSMENT
68F PMH HTN, DMT2, Breast Cancer (diagnosed in Feb 2017) currently on trastuzumab (last dose Jan 20th), originally presented 1/30 with fever found to have influenza subsequently went into PEA arrest x 2 , complicated by bilateral pneumothoraces with pneumomediastinum s/p 3 chest tubes, and also new ESRD requiring almost daily HD, readmitted to the MICU for hypoxic respiratory failure likely 2/2 aspiration pneumonia with MSSA bacteremia and pneumonia, extubated on 3/1, with stable respiratory status  clinically improved  no resp distress, though lethargic  NGt feeds  MSSA bacteremia -cleared since 2/22    continue Cefazolin  ("2-2-3" post dialysis) through 3/22/18  remove Mediport unless needed.    Cleared for PermaCath placement and PEG placement from ID perspective.

## 2018-03-08 LAB
ALBUMIN SERPL ELPH-MCNC: 2.6 G/DL — LOW (ref 3.3–5)
ALP SERPL-CCNC: 303 U/L — HIGH (ref 40–120)
ALT FLD-CCNC: <5 U/L RC — LOW (ref 10–45)
ANION GAP SERPL CALC-SCNC: 11 MMOL/L — SIGNIFICANT CHANGE UP (ref 5–17)
APTT BLD: 28.8 SEC — SIGNIFICANT CHANGE UP (ref 27.5–37.4)
AST SERPL-CCNC: 21 U/L — SIGNIFICANT CHANGE UP (ref 10–40)
BILIRUB SERPL-MCNC: 0.2 MG/DL — SIGNIFICANT CHANGE UP (ref 0.2–1.2)
BUN SERPL-MCNC: 83 MG/DL — HIGH (ref 7–23)
CALCIUM SERPL-MCNC: 9.5 MG/DL — SIGNIFICANT CHANGE UP (ref 8.4–10.5)
CHLORIDE SERPL-SCNC: 105 MMOL/L — SIGNIFICANT CHANGE UP (ref 96–108)
CO2 SERPL-SCNC: 28 MMOL/L — SIGNIFICANT CHANGE UP (ref 22–31)
CREAT SERPL-MCNC: 4.97 MG/DL — HIGH (ref 0.5–1.3)
GLUCOSE BLDC GLUCOMTR-MCNC: 147 MG/DL — HIGH (ref 70–99)
GLUCOSE BLDC GLUCOMTR-MCNC: 169 MG/DL — HIGH (ref 70–99)
GLUCOSE BLDC GLUCOMTR-MCNC: 208 MG/DL — HIGH (ref 70–99)
GLUCOSE BLDC GLUCOMTR-MCNC: 93 MG/DL — SIGNIFICANT CHANGE UP (ref 70–99)
GLUCOSE SERPL-MCNC: 89 MG/DL — SIGNIFICANT CHANGE UP (ref 70–99)
HCT VFR BLD CALC: 22.5 % — LOW (ref 34.5–45)
HGB BLD-MCNC: 7.2 G/DL — LOW (ref 11.5–15.5)
INR BLD: 0.94 RATIO — SIGNIFICANT CHANGE UP (ref 0.88–1.16)
MAGNESIUM SERPL-MCNC: 3.1 MG/DL — HIGH (ref 1.6–2.6)
MCHC RBC-ENTMCNC: 32.1 PG — SIGNIFICANT CHANGE UP (ref 27–34)
MCHC RBC-ENTMCNC: 32.2 GM/DL — SIGNIFICANT CHANGE UP (ref 32–36)
MCV RBC AUTO: 99.5 FL — SIGNIFICANT CHANGE UP (ref 80–100)
PHOSPHATE SERPL-MCNC: 5.3 MG/DL — HIGH (ref 2.5–4.5)
PLATELET # BLD AUTO: 310 K/UL — SIGNIFICANT CHANGE UP (ref 150–400)
POTASSIUM SERPL-MCNC: 4.1 MMOL/L — SIGNIFICANT CHANGE UP (ref 3.5–5.3)
POTASSIUM SERPL-SCNC: 4.1 MMOL/L — SIGNIFICANT CHANGE UP (ref 3.5–5.3)
PROT SERPL-MCNC: 6.7 G/DL — SIGNIFICANT CHANGE UP (ref 6–8.3)
PROTHROM AB SERPL-ACNC: 10.2 SEC — SIGNIFICANT CHANGE UP (ref 9.8–12.7)
RBC # BLD: 2.26 M/UL — LOW (ref 3.8–5.2)
RBC # FLD: 16.4 % — HIGH (ref 10.3–14.5)
SODIUM SERPL-SCNC: 144 MMOL/L — SIGNIFICANT CHANGE UP (ref 135–145)
WBC # BLD: 6.1 K/UL — SIGNIFICANT CHANGE UP (ref 3.8–10.5)
WBC # FLD AUTO: 6.1 K/UL — SIGNIFICANT CHANGE UP (ref 3.8–10.5)

## 2018-03-08 PROCEDURE — 99232 SBSQ HOSP IP/OBS MODERATE 35: CPT

## 2018-03-08 PROCEDURE — 99233 SBSQ HOSP IP/OBS HIGH 50: CPT | Mod: GC

## 2018-03-08 PROCEDURE — 76937 US GUIDE VASCULAR ACCESS: CPT | Mod: 26

## 2018-03-08 PROCEDURE — 36558 INSERT TUNNELED CV CATH: CPT | Mod: 53

## 2018-03-08 PROCEDURE — 77001 FLUOROGUIDE FOR VEIN DEVICE: CPT | Mod: 26

## 2018-03-08 RX ORDER — CEFAZOLIN SODIUM 1 G
500 VIAL (EA) INJECTION EVERY 12 HOURS
Qty: 0 | Refills: 0 | Status: DISCONTINUED | OUTPATIENT
Start: 2018-03-08 | End: 2018-03-09

## 2018-03-08 RX ORDER — ONDANSETRON 8 MG/1
4 TABLET, FILM COATED ORAL ONCE
Qty: 0 | Refills: 0 | Status: DISCONTINUED | OUTPATIENT
Start: 2018-03-08 | End: 2018-03-11

## 2018-03-08 RX ADMIN — CARVEDILOL PHOSPHATE 6.25 MILLIGRAM(S): 80 CAPSULE, EXTENDED RELEASE ORAL at 05:08

## 2018-03-08 RX ADMIN — HUMAN INSULIN 18 UNIT(S): 100 INJECTION, SUSPENSION SUBCUTANEOUS at 13:09

## 2018-03-08 RX ADMIN — Medication 81 MILLIGRAM(S): at 12:00

## 2018-03-08 RX ADMIN — Medication 2: at 00:07

## 2018-03-08 RX ADMIN — Medication 4: at 18:07

## 2018-03-08 RX ADMIN — NYSTATIN CREAM 1 APPLICATION(S): 100000 CREAM TOPICAL at 17:34

## 2018-03-08 RX ADMIN — SODIUM CHLORIDE 3 MILLILITER(S): 9 INJECTION INTRAMUSCULAR; INTRAVENOUS; SUBCUTANEOUS at 21:56

## 2018-03-08 RX ADMIN — ONDANSETRON 4 MILLIGRAM(S): 8 TABLET, FILM COATED ORAL at 12:19

## 2018-03-08 RX ADMIN — HUMAN INSULIN 18 UNIT(S): 100 INJECTION, SUSPENSION SUBCUTANEOUS at 23:23

## 2018-03-08 RX ADMIN — HEPARIN SODIUM 5000 UNIT(S): 5000 INJECTION INTRAVENOUS; SUBCUTANEOUS at 13:09

## 2018-03-08 RX ADMIN — Medication 3 MILLILITER(S): at 14:45

## 2018-03-08 RX ADMIN — CALAMINE 8% AND ZINC OXIDE 8% 1 APPLICATION(S): 160 LOTION TOPICAL at 12:00

## 2018-03-08 RX ADMIN — PANTOPRAZOLE SODIUM 40 MILLIGRAM(S): 20 TABLET, DELAYED RELEASE ORAL at 12:00

## 2018-03-08 RX ADMIN — SODIUM CHLORIDE 3 MILLILITER(S): 9 INJECTION INTRAMUSCULAR; INTRAVENOUS; SUBCUTANEOUS at 14:45

## 2018-03-08 RX ADMIN — ONDANSETRON 4 MILLIGRAM(S): 8 TABLET, FILM COATED ORAL at 00:51

## 2018-03-08 RX ADMIN — Medication 2: at 23:23

## 2018-03-08 RX ADMIN — CARVEDILOL PHOSPHATE 6.25 MILLIGRAM(S): 80 CAPSULE, EXTENDED RELEASE ORAL at 17:34

## 2018-03-08 RX ADMIN — NYSTATIN CREAM 1 APPLICATION(S): 100000 CREAM TOPICAL at 05:09

## 2018-03-08 RX ADMIN — Medication 3 MILLILITER(S): at 21:55

## 2018-03-08 RX ADMIN — HEPARIN SODIUM 5000 UNIT(S): 5000 INJECTION INTRAVENOUS; SUBCUTANEOUS at 21:59

## 2018-03-08 RX ADMIN — HUMAN INSULIN 18 UNIT(S): 100 INJECTION, SUSPENSION SUBCUTANEOUS at 18:07

## 2018-03-08 RX ADMIN — Medication 100 MILLIGRAM(S): at 18:07

## 2018-03-08 NOTE — PROGRESS NOTE ADULT - ATTENDING COMMENTS
Agree with above. Patient seen and examined. Transferred to RCU  -Acute Hypoxemic Respiratory Failure in the setting of Influenza and developed respiratory failure and cardiac arrest s/p intubation then extubated and reintubated after aspiration event - now extubated but still with mild secretions. Aggressive chest PT. Maintain O2 sat > 90  -Acute Renal Failure - now requiring HD - IR permacath today. remove shiley after permacath placement. HD as per renal. Some urine output  -Oropharyngeal dysphagia - NGT feeds. Speech/swallow reeval - but I suspect patient will require PEG tube. 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

## 2018-03-08 NOTE — PROGRESS NOTE ADULT - SUBJECTIVE AND OBJECTIVE BOX
[facility] Division of Kidney Diseases & Hypertension  FOLLOW UP NOTE  [phone]--------------------------------------------------------------------------------  Chief Complaint:Cardiac arrest      24 hour events/subjective:    No acute events noted  No complaints.     PAST HISTORY  --------------------------------------------------------------------------------  No significant changes to PMH, PSH, FHx, SHx, unless otherwise noted    ALLERGIES & MEDICATIONS  --------------------------------------------------------------------------------  Allergies    doxycycline (Rash)  isoniazid (Rash)  NIFEdipine (Urticaria; Hives)  vitamin E (Short breath; Urticaria; Hives)    Intolerances      Standing Inpatient Medications  ALBUTerol/ipratropium for Nebulization 3 milliLiter(s) Nebulizer every 8 hours  aspirin  chewable 81 milliGRAM(s) Oral daily  calamine Lotion 1 Application(s) Topical daily  carvedilol 6.25 milliGRAM(s) Oral every 12 hours  ceFAZolin   IVPB 2000 milliGRAM(s) IV Intermittent <User Schedule>  ceFAZolin   IVPB 3000 milliGRAM(s) IV Intermittent <User Schedule>  dextrose 5%. 1000 milliLiter(s) IV Continuous <Continuous>  dextrose 5%. 1000 milliLiter(s) IV Continuous <Continuous>  dextrose 5%. 1000 milliLiter(s) IV Continuous <Continuous>  dextrose 50% Injectable 12.5 Gram(s) IV Push once  dextrose 50% Injectable 25 Gram(s) IV Push once  dextrose 50% Injectable 25 Gram(s) IV Push once  epoetin odalis Injectable 13521 Unit(s) IV Push <User Schedule>  heparin  Injectable 5000 Unit(s) SubCutaneous every 8 hours  insulin lispro (HumaLOG) corrective regimen sliding scale   SubCutaneous every 6 hours  insulin NPH human recombinant 18 Unit(s) SubCutaneous <User Schedule>  insulin NPH human recombinant 15 Unit(s) SubCutaneous <User Schedule>  nystatin Powder 1 Application(s) Topical two times a day  pantoprazole  Injectable 40 milliGRAM(s) IV Push daily  sodium chloride 3%  Inhalation 3 milliLiter(s) Inhalation three times a day    PRN Inpatient Medications  chlorhexidine 0.12% Liquid 15 milliLiter(s) Swish and Spit every 4 hours PRN  dextrose Gel 1 Dose(s) Oral once PRN  glucagon  Injectable 1 milliGRAM(s) IntraMuscular once PRN  hydrocortisone 1% Ointment 1 Application(s) Topical every 8 hours PRN  midodrine 10 milliGRAM(s) Oral every other day PRN  ondansetron Injectable 4 milliGRAM(s) IV Push every 6 hours PRN      REVIEW OF SYSTEMS  --------------------------------------------------------------------------------  Gen: No  fevers/chills  Skin: No rashes  Head/Eyes/Ears/Mouth: No headache; Normal hearing; Normal vision w/o blurriness  Respiratory: No dyspnea, cough, wheezing, hemoptysis  CV: No chest pain, PND, orthopnea  GI: No abdominal pain, diarrhea, constipation, nausea, vomiting  : No increased frequency, dysuria, hematuria, nocturia  MSK: No joint pain/swelling; no back pain; no edema  Neuro: No dizziness/lightheadedness, weakness, seizures, numbness, tingling      All other systems were reviewed and are negative, except as noted.    VITALS/PHYSICAL EXAM  --------------------------------------------------------------------------------  T(C): 36.6 (03-08-18 @ 07:27), Max: 36.9 (03-08-18 @ 01:42)  HR: 82 (03-08-18 @ 07:27) (82 - 101)  BP: 143/73 (03-08-18 @ 07:27) (136/72 - 169/65)  RR: 18 (03-08-18 @ 07:27) (17 - 18)  SpO2: 100% (03-08-18 @ 07:27) (98% - 100%)  Wt(kg): --        03-07-18 @ 07:01  -  03-08-18 @ 07:00  --------------------------------------------------------  IN: 820 mL / OUT: 0 mL / NET: 820 mL      Physical Exam:  	  Gen: NAD  	HEENT: PERRL, supple neck, clear oropharynx  	Pulm: CTA B/L  	CV: RRR, S1S2;  	Back: No spinal or CVA tenderness  	Abd: +BS, soft, nontender/nondistended  	: No suprapubic tenderness              Extremities: no bilateral LE edema noted.               Neuro: Awake   	Skin: Warm and dry   	Vascular access: Right IJ non tunneled HD catheter present.     LABS/STUDIES  --------------------------------------------------------------------------------              7.2    6.1   >-----------<  310      [03-08-18 @ 06:42]              22.5     144  |  105  |  83  ----------------------------<  89      [03-08-18 @ 06:42]  4.1   |  28  |  4.97        Ca     9.5     [03-08-18 @ 06:42]      Mg     3.1     [03-08-18 @ 06:42]      Phos  5.3     [03-08-18 @ 06:42]    TPro  6.7  /  Alb  2.6  /  TBili  0.2  /  DBili  x   /  AST  21  /  ALT  <5  /  AlkPhos  303  [03-08-18 @ 06:42]    PT/INR: PT 10.2 , INR 0.94       [03-08-18 @ 06:42]  PTT: 28.8       [03-08-18 @ 06:42]      Creatinine Trend:  SCr 4.97 [03-08 @ 06:42]  SCr 4.46 [03-07 @ 06:31]  SCr 4.81 [03-06 @ 05:58]  SCr 3.98 [03-05 @ 00:25]  SCr 3.12 [03-04 @ 00:22]        Iron 32, TIBC 248, %sat 13      [03-07-18 @ 14:34]  Ferritin 174      [03-07-18 @ 14:34]

## 2018-03-08 NOTE — PROGRESS NOTE ADULT - SUBJECTIVE AND OBJECTIVE BOX
Diabetes Follow up note:  Interval Hx:  67 year old female w/uncontrolled T2DM w/complications here w/Flu c/b PEA arrest and Acute respiratory failure s/p permacath placement this AM. Pt restarted on tube feeds (goal rate). BG values at goal on/off tube feeds over past 24 hours.     Review of Systems:  General: "I feel thirsty"  GI: Tolerating POs without any N/V/D/ABD PAIN.  CV: No CP/SOB  ENDO: No S&Sx of hypoglycemia  MEDS:    insulin lispro (HumaLOG) corrective regimen sliding scale   SubCutaneous every 6 hours  insulin NPH human recombinant 18 Unit(s) SubCutaneous <User Schedule>  insulin NPH human recombinant 15 Unit(s) SubCutaneous <User Schedule>    ceFAZolin   IVPB 500 milliGRAM(s) IV Intermittent every 12 hours    Allergies    doxycycline (Rash)  isoniazid (Rash)  NIFEdipine (Urticaria; Hives)  vitamin E (Short breath; Urticaria; Hives)        PE:  General: Female lying in bed. NAD.   Vital Signs Last 24 Hrs  T(C): 36.6 (08 Mar 2018 11:25), Max: 36.9 (08 Mar 2018 01:42)  T(F): 97.9 (08 Mar 2018 11:25), Max: 98.5 (08 Mar 2018 01:42)  HR: 85 (08 Mar 2018 11:25) (82 - 91)  BP: 169/78 (08 Mar 2018 11:25) (136/72 - 169/78)  BP(mean): --  RR: 17 (08 Mar 2018 11:25) (17 - 18)  SpO2: 100% (08 Mar 2018 11:25) (98% - 100%)  Resp: CTA b/l  Abd: Soft, NT,ND, NGT in place  Extremities: Warm. No edema x 4 ext.   Neuro: Awake. Answering questions in english and tejinder.     LABS:    POCT Blood Glucose.: 147 mg/dL (03-08-18 @ 12:23)  POCT Blood Glucose.: 93 mg/dL (03-08-18 @ 05:50)  POCT Blood Glucose.: 179 mg/dL (03-07-18 @ 23:34)  POCT Blood Glucose.: 143 mg/dL (03-07-18 @ 17:36)  POCT Blood Glucose.: 154 mg/dL (03-07-18 @ 12:25)  POCT Blood Glucose.: 164 mg/dL (03-07-18 @ 05:55)  POCT Blood Glucose.: 117 mg/dL (03-06-18 @ 23:45)  POCT Blood Glucose.: 102 mg/dL (03-06-18 @ 17:39)  POCT Blood Glucose.: 195 mg/dL (03-06-18 @ 11:58)  POCT Blood Glucose.: 171 mg/dL (03-06-18 @ 05:20)  POCT Blood Glucose.: 208 mg/dL (03-05-18 @ 23:56)  POCT Blood Glucose.: 187 mg/dL (03-05-18 @ 17:52)                            7.2    6.1   )-----------( 310      ( 08 Mar 2018 06:42 )             22.5       03-08    144  |  105  |  83<H>  ----------------------------<  89  4.1   |  28  |  4.97<H>    Ca    9.5      08 Mar 2018 06:42  Phos  5.3     03-08  Mg     3.1     03-08    TPro  6.7  /  Alb  2.6<L>  /  TBili  0.2  /  DBili  x   /  AST  21  /  ALT  <5<L>  /  AlkPhos  303<H>  03-08        Hemoglobin A1C, Whole Blood: 8.6 % <H> [4.0 - 5.6] (01-31-18 @ 07:15)  Hemoglobin A1C, Whole Blood: 8.7 % <H> [4.0 - 5.6] (01-31-18 @ 05:51)            Contact number: marcelino [phone] or [phone]

## 2018-03-08 NOTE — PROGRESS NOTE ADULT - SUBJECTIVE AND OBJECTIVE BOX
Patient is a 68y old  Female who presents with a chief complaint of Fever, total body weakness (02 Feb 2018 13:44)      Interval Events:    REVIEW OF SYSTEMS:  [ ] Positive  [x ] All other systems negative  [ ] Unable to assess ROS because ________    Vital Signs Last 24 Hrs  T(C): 36.6 (03-08-18 @ 07:27), Max: 36.9 (03-08-18 @ 01:42)  T(F): 97.9 (03-08-18 @ 07:27), Max: 98.5 (03-08-18 @ 01:42)  HR: 82 (03-08-18 @ 07:27) (82 - 101)  BP: 143/73 (03-08-18 @ 07:27) (136/72 - 169/65)  RR: 18 (03-08-18 @ 07:27) (17 - 18)  SpO2: 100% (03-08-18 @ 07:27) (98% - 100%)    PHYSICAL EXAM:  HEENT:   [ ]Tracheostomy:n/a  [ ]Pupils equal  [ ]No oral lesions  [ ]Abnormal    SKIN  [x ]No Rash  [ ] Abnormal  [ ] pressure    CARDIAC  [ x]Regular  [ ]Abnormal    PULMONARY  [x ]Bilateral Clear Breath Sounds  [ ]Normal Excursion  [ ]Abnormal    GI  [x ]PEG      [x ] +BS		              [ x]Soft, nondistended, nontender	  [ ]Abnormal    MUSCULOSKELETAL                                   [ ]Bedbound                 [ ]Abnormal    [x ]Ambulatory/OOB to chair                           EXTREMITIES                                         [x ]Normal  [ ]Edema                           NEUROLOGIC  [ ] Normal, non focal  [x ] Focal findings: weakness to RLE    PSYCHIATRIC  [ x]Alert and appropriate  [ ] Sedated	 [ ]Agitated    :  Wheeler: [ ] Yes, if yes: Date of Placement:                   [ x ] No    LINES: Central Lines [ ] Yes, if yes: Date of Placement                                     [ x ] No    HOSPITAL MEDICATIONS:  MEDICATIONS  (STANDING):  ALBUTerol/ipratropium for Nebulization 3 milliLiter(s) Nebulizer every 8 hours  aspirin  chewable 81 milliGRAM(s) Oral daily  calamine Lotion 1 Application(s) Topical daily  carvedilol 6.25 milliGRAM(s) Oral every 12 hours  ceFAZolin   IVPB 2000 milliGRAM(s) IV Intermittent <User Schedule>  ceFAZolin   IVPB 3000 milliGRAM(s) IV Intermittent <User Schedule>  dextrose 5%. 1000 milliLiter(s) (50 mL/Hr) IV Continuous <Continuous>  dextrose 5%. 1000 milliLiter(s) (50 mL/Hr) IV Continuous <Continuous>  dextrose 5%. 1000 milliLiter(s) (50 mL/Hr) IV Continuous <Continuous>  dextrose 50% Injectable 12.5 Gram(s) IV Push once  dextrose 50% Injectable 25 Gram(s) IV Push once  dextrose 50% Injectable 25 Gram(s) IV Push once  epoetin odalis Injectable 78517 Unit(s) IV Push <User Schedule>  heparin  Injectable 5000 Unit(s) SubCutaneous every 8 hours  insulin lispro (HumaLOG) corrective regimen sliding scale   SubCutaneous every 6 hours  insulin NPH human recombinant 18 Unit(s) SubCutaneous <User Schedule>  insulin NPH human recombinant 15 Unit(s) SubCutaneous <User Schedule>  nystatin Powder 1 Application(s) Topical two times a day  pantoprazole  Injectable 40 milliGRAM(s) IV Push daily  sodium chloride 3%  Inhalation 3 milliLiter(s) Inhalation three times a day    MEDICATIONS  (PRN):  chlorhexidine 0.12% Liquid 15 milliLiter(s) Swish and Spit every 4 hours PRN mouth hygiene  dextrose Gel 1 Dose(s) Oral once PRN Blood Glucose LESS THAN 70 milliGRAM(s)/deciliter  glucagon  Injectable 1 milliGRAM(s) IntraMuscular once PRN Glucose LESS THAN 70 milligrams/deciliter  hydrocortisone 1% Ointment 1 Application(s) Topical every 8 hours PRN Rash and/or Itching  midodrine 10 milliGRAM(s) Oral every other day PRN prior to dialysis  ondansetron Injectable 4 milliGRAM(s) IV Push every 6 hours PRN Nausea and/or Vomiting      LABS:                        7.2    6.1   )-----------( 310      ( 08 Mar 2018 06:42 )             22.5     03-08    144  |  105  |  83<H>  ----------------------------<  89  4.1   |  28  |  4.97<H>    Ca    9.5      08 Mar 2018 06:42  Phos  5.3     03-08  Mg     3.1     03-08    TPro  6.7  /  Alb  2.6<L>  /  TBili  0.2  /  DBili  x   /  AST  21  /  ALT  <5<L>  /  AlkPhos  303<H>  03-08    PT/INR - ( 08 Mar 2018 06:42 )   PT: 10.2 sec;   INR: 0.94 ratio         PTT - ( 08 Mar 2018 06:42 )  PTT:28.8 sec        CAPILLARY BLOOD GLUCOSE    MICROBIOLOGY:     RADIOLOGY:  [ ] Reviewed and interpreted by me Patient is a 68y old  Female who presents with a chief complaint of Fever, total body weakness (02 Feb 2018 13:44)      Interval Events: for Permacath placement    REVIEW OF SYSTEMS:  [ ] Positive  [x ] All other systems negative  [ ] Unable to assess ROS because ________    Vital Signs Last 24 Hrs  T(C): 36.6 (03-08-18 @ 07:27), Max: 36.9 (03-08-18 @ 01:42)  T(F): 97.9 (03-08-18 @ 07:27), Max: 98.5 (03-08-18 @ 01:42)  HR: 82 (03-08-18 @ 07:27) (82 - 101)  BP: 143/73 (03-08-18 @ 07:27) (136/72 - 169/65)  RR: 18 (03-08-18 @ 07:27) (17 - 18)  SpO2: 100% (03-08-18 @ 07:27) (98% - 100%)    PHYSICAL EXAM:  HEENT:   [ ]Tracheostomy:n/a  [ ]Pupils equal  [ ]No oral lesions  [ ]Abnormal    SKIN  [x ]No Rash  [ ] Abnormal  [ ] pressure    CARDIAC  [ x]Regular  [ ]Abnormal    PULMONARY  [x ]Bilateral Clear Breath Sounds  [ ]Normal Excursion  [ ]Abnormal    GI  [x ]PEG      [x ] +BS		              [ x]Soft, nondistended, nontender	  [ ]Abnormal    MUSCULOSKELETAL                                   [ ]Bedbound                 [ ]Abnormal    [x ]Ambulatory/OOB to chair                           EXTREMITIES                                         [x ]Normal  [ ]Edema                           NEUROLOGIC  [ ] Normal, non focal  [x ] Focal findings: weakness to RLE    PSYCHIATRIC  [ x]Alert and appropriate  [ ] Sedated	 [ ]Agitated    :  Wheeler: [ ] Yes, if yes: Date of Placement:                   [ x ] No    LINES: Central Lines [ ] Yes, if yes: Date of Placement                                     [ x ] No    HOSPITAL MEDICATIONS:  MEDICATIONS  (STANDING):  ALBUTerol/ipratropium for Nebulization 3 milliLiter(s) Nebulizer every 8 hours  aspirin  chewable 81 milliGRAM(s) Oral daily  calamine Lotion 1 Application(s) Topical daily  carvedilol 6.25 milliGRAM(s) Oral every 12 hours  ceFAZolin   IVPB 2000 milliGRAM(s) IV Intermittent <User Schedule>  ceFAZolin   IVPB 3000 milliGRAM(s) IV Intermittent <User Schedule>  dextrose 5%. 1000 milliLiter(s) (50 mL/Hr) IV Continuous <Continuous>  dextrose 5%. 1000 milliLiter(s) (50 mL/Hr) IV Continuous <Continuous>  dextrose 5%. 1000 milliLiter(s) (50 mL/Hr) IV Continuous <Continuous>  dextrose 50% Injectable 12.5 Gram(s) IV Push once  dextrose 50% Injectable 25 Gram(s) IV Push once  dextrose 50% Injectable 25 Gram(s) IV Push once  epoetin odalis Injectable 54589 Unit(s) IV Push <User Schedule>  heparin  Injectable 5000 Unit(s) SubCutaneous every 8 hours  insulin lispro (HumaLOG) corrective regimen sliding scale   SubCutaneous every 6 hours  insulin NPH human recombinant 18 Unit(s) SubCutaneous <User Schedule>  insulin NPH human recombinant 15 Unit(s) SubCutaneous <User Schedule>  nystatin Powder 1 Application(s) Topical two times a day  pantoprazole  Injectable 40 milliGRAM(s) IV Push daily  sodium chloride 3%  Inhalation 3 milliLiter(s) Inhalation three times a day    MEDICATIONS  (PRN):  chlorhexidine 0.12% Liquid 15 milliLiter(s) Swish and Spit every 4 hours PRN mouth hygiene  dextrose Gel 1 Dose(s) Oral once PRN Blood Glucose LESS THAN 70 milliGRAM(s)/deciliter  glucagon  Injectable 1 milliGRAM(s) IntraMuscular once PRN Glucose LESS THAN 70 milligrams/deciliter  hydrocortisone 1% Ointment 1 Application(s) Topical every 8 hours PRN Rash and/or Itching  midodrine 10 milliGRAM(s) Oral every other day PRN prior to dialysis  ondansetron Injectable 4 milliGRAM(s) IV Push every 6 hours PRN Nausea and/or Vomiting      LABS:                        7.2    6.1   )-----------( 310      ( 08 Mar 2018 06:42 )             22.5     03-08    144  |  105  |  83<H>  ----------------------------<  89  4.1   |  28  |  4.97<H>    Ca    9.5      08 Mar 2018 06:42  Phos  5.3     03-08  Mg     3.1     03-08    TPro  6.7  /  Alb  2.6<L>  /  TBili  0.2  /  DBili  x   /  AST  21  /  ALT  <5<L>  /  AlkPhos  303<H>  03-08    PT/INR - ( 08 Mar 2018 06:42 )   PT: 10.2 sec;   INR: 0.94 ratio         PTT - ( 08 Mar 2018 06:42 )  PTT:28.8 sec        CAPILLARY BLOOD GLUCOSE    MICROBIOLOGY:     RADIOLOGY:  [ ] Reviewed and interpreted by me

## 2018-03-08 NOTE — PROGRESS NOTE ADULT - SUBJECTIVE AND OBJECTIVE BOX
Follow Up:      Interval History/ROS: rousable, responsive    Allergies  doxycycline (Rash)  isoniazid (Rash)  NIFEdipine (Urticaria; Hives)  vitamin E (Short breath; Urticaria; Hives)    ANTIMICROBIALS:  ceFAZolin   IVPB 500 every 12 hours    OTHER MEDS:  MEDICATIONS  (STANDING):  ALBUTerol/ipratropium for Nebulization 3 every 8 hours  aspirin  chewable 81 daily  carvedilol 6.25 every 12 hours  dextrose 50% Injectable 12.5 once  dextrose 50% Injectable 25 once  dextrose 50% Injectable 25 once  dextrose Gel 1 once PRN  epoetin odalis Injectable 36967 <User Schedule>  glucagon  Injectable 1 once PRN  heparin  Injectable 5000 every 8 hours  insulin lispro (HumaLOG) corrective regimen sliding scale  every 6 hours  insulin NPH human recombinant 18 <User Schedule>  insulin NPH human recombinant 15 <User Schedule>  midodrine 10 every other day PRN  ondansetron Injectable 4 every 6 hours PRN  ondansetron Injectable 4 once  pantoprazole  Injectable 40 daily  sodium chloride 3%  Inhalation 3 three times a day      Vital Signs Last 24 Hrs  T(C): 37 (08 Mar 2018 17:21), Max: 37 (08 Mar 2018 17:21)  T(F): 98.6 (08 Mar 2018 17:21), Max: 98.6 (08 Mar 2018 17:21)  HR: 92 (08 Mar 2018 17:21) (82 - 92)  BP: 162/75 (08 Mar 2018 17:21) (136/72 - 169/78)  BP(mean): --  RR: 18 (08 Mar 2018 15:27) (17 - 18)  SpO2: 100% (08 Mar 2018 15:27) (98% - 100%)    PHYSICAL EXAM:  General: chronically ill appearing NAD, Non-toxic  soft NG feeding tube in left nares  Neurology: lethargic  Respiratory: Clear to auscultation bilaterally, no resp distress  CV: RRR, S1S2, no murmurs, rubs or gallops  Abdominal: Soft, Non-tender, non-distended, normal bowel sounds  Extremities: No edema,   Line Sites: Clear permacath right subclav - mediport site left subclav clear  Skin: No rash                        7.2    6.1   )-----------( 310      ( 08 Mar 2018 06:42 )             22.5       03-08    144  |  105  |  83<H>  ----------------------------<  89  4.1   |  28  |  4.97<H>    Ca    9.5      08 Mar 2018 06:42  Phos  5.3     03-08  Mg     3.1     03-08    TPro  6.7  /  Alb  2.6<L>  /  TBili  0.2  /  DBili  x   /  AST  21  /  ALT  <5<L>  /  AlkPhos  303<H>  03-08      MICROBIOLOGY:  .Blood Blood-Venous  02-27-18   No growth at 5 days.  --  --      .Blood Blood-Peripheral  02-24-18   No growth at 5 days.  --  --      .Sputum Sputum trap  02-22-18   Numerous Staphylococcus aureus ***********Note************  This isolate demonstrates inducible  clindamycin resistance.  Clindamycin may still be effective in some patients.  No Normal Respiratory Ctae present  --  Staphylococcus aureus      .Blood Blood  02-22-18   Growth in anaerobic bottle: Staphylococcus aureus  ***********Note************  This isolate demonstrates inducible  clindamycin resistance.  Clindamycin may still be effective in some patients.  "Due to technical problems, Proteus sp. will Not be reported as part of  the BCID panel until further notice"  ***Blood Panel PCR results on this specimen are available  approximately 3 hours after the Gram stain result.***  Gram stain, PCR, and/or culture results may not always  correspond due to difference in methodologies.  ************************************************************  This PCR assay was performed using [employer].  The following targets are tested for: Enterococcus,  vancomycin resistant enterococci, Listeria monocytogenes,  coagulase negative staphylococci, S. aureus,  methicillin resistant S. aureus, Streptococcus agalactiae  (Group B), S. pneumoniae, S. pyogenes (Group A),  Acinetobacter baumannii, Enterobacter cloacae, E. coli,  Klebsiella oxytoca, K. pneumoniae, Proteus sp.,  Serratia marcescens, Haemophilus influenzae,  Neisseria meningitidis, Pseudomonas aeruginosa, Candida  albicans, C. glabrata, C krusei, C parapsilosis,  C. tropicalis and the KPC resistance gene.  --  Blood Culture PCR  Staphylococcus aureus      .Blood Blood  02-22-18   No growth at 5 days.  --  --      .Blood Blood-Peripheral  02-19-18   No growth at 5 days.  --  --      RADIOLOGY:    Jas Durand MD; Division of Infectious Disease; Pager: [phone]; nights and weekends: [phone]

## 2018-03-08 NOTE — PROGRESS NOTE ADULT - ASSESSMENT
69 y/o F with T2DM uncontrolled on insulin, osteoporosis, HTN, here with acute respiratory failure 2/2 influenza s/p PEA arrest x 2, on month long tx for aspiration pna. Pt remains on tube feeds-unable to participate in swallow eval today 2/2 lethargy. BG values at goal on present insulin regimen. BG goal (100-180mg/dl). If passes swallow eval-will require basal/bolus for glycemic control.

## 2018-03-08 NOTE — PROVIDER CONTACT NOTE (MEDICATION) - RECOMMENDATIONS
Hold heparin sq, pt with B/L pas.
OK to hold NPH while TF off and bipap on. Continue FS q6.
OK to hold hydralazine and coreg in the evening.
stacey hernandez

## 2018-03-08 NOTE — PROGRESS NOTE ADULT - PROBLEM SELECTOR PLAN 1
from ATN in setting of cardiac arrest. Pt continues to be oligo-anuric, requiring dialysis. No evidence of renal recovery at present.  Patient scheduled for tunneled HD catheter placement  by IR today. Labs reviewed from today; no plan for HD today. Monitor BMP daily.

## 2018-03-08 NOTE — PROGRESS NOTE ADULT - ATTENDING COMMENTS
#RUSS- anuric  ATN-  increase in UO per /daughter; patient received 37 mins hd yesterday; lytes stable and creatinine decrease could be due to HD. continue to monitor cr trend for recovery; please check early morning AM labs on 3/8/18; if cr decreases may not need permcath/HD  #access- has shiley- will need permcath depending on labs;   #anemia -epo tiw; change to SC; check iron studies  #infection/bacteremia- on dialysis dosing for cefazolin; may need to be changed if recovery; will determine based on creatinine tomorroe #RUSS- anuric  ATN-  increase in UO per /daughter; cr uptrending. continue to monitor cr trend; please check early morning AM labs on 3/8/18;  will hold off hd today, plan for hd trena 3/8/18 dependent on labs  #access- has shiley- will need permcath today; outpatient HD set up with SW;   #anemia -epo tiw; change to SC; check iron studies  #infection/bacteremia- on dialysis dosing for cefazolin;

## 2018-03-08 NOTE — SWALLOW BEDSIDE ASSESSMENT ADULT - ADDITIONAL RECOMMENDATIONS
maintain good oral hygiene
Maintain good oral hygiene   POC pending FEES.
Maintain good oral hygiene   Service will f/u to re-assess swallow function as clinically appropriate

## 2018-03-08 NOTE — SWALLOW BEDSIDE ASSESSMENT ADULT - SWALLOW EVAL: DIAGNOSIS
Consult for bedside swallow evaluation received and appreciated. Case d/w NP Amena, MD Ortiz and TAMRA Cruz. Patient is currently off the floor at this time. This service will f/u this afternoon.

## 2018-03-08 NOTE — PROGRESS NOTE ADULT - SUBJECTIVE AND OBJECTIVE BOX
Interventional Radiology Pre-Procedure Note    This is a 68y Female PMH of DMII, h/o Breast CA s/p B/L mastectomy who was admitted with influenza on 1/30 and subsequently went into PEA arrest x 2 with ROSC. Hospital course complicated by bilateral pneumothoraces with pneumomediastinum s/p 3 chest tubes, and new ESRD requiring frequent HD. Pt has had 2 MICU admissions, 2nd re-admission due to hypoxic respiratory failure likely 2/2 aspiration pneumonia with MSSA bacteremia. She is currently clinically stable off the ICU in RCU.     Pt is requiring long term HD access. Last HD on 3/6 via [facility] shiley placed in MICU on 2/24. IR requested for placement of tunnelled HD catheter. ID clearance appreciated, bacteremia cleared. Blood cx neg x2 (2/24, 2/27). She is still on cefazolin until 3/22/18.     Pt accompanied by .     PAST MEDICAL & SURGICAL HISTORY:  Diabetes  Breast CA  H/O mastectomy, bilateral       Vital Signs Last 24 Hrs  T(C): 36.6 (08 Mar 2018 07:27), Max: 36.9 (08 Mar 2018 01:42)  T(F): 97.9 (08 Mar 2018 07:27), Max: 98.5 (08 Mar 2018 01:42)  HR: 82 (08 Mar 2018 07:27) (82 - 101)  BP: 143/73 (08 Mar 2018 07:27) (136/72 - 169/65)  BP(mean): --  RR: 18 (08 Mar 2018 07:27) (17 - 18)  SpO2: 100% (08 Mar 2018 07:27) (98% - 100%)    Allergies: doxycycline (Rash)  isoniazid (Rash)  NIFEdipine (Urticaria; Hives)  vitamin E (Short breath; Urticaria; Hives)      Physical Exam: Gen: NAD, appears fatigued in and out of sleep.     Labs:                         7.2    6.1   )-----------( 310      ( 08 Mar 2018 06:42 )             22.5     03-08    144  |  105  |  83<H>  ----------------------------<  89  4.1   |  28  |  4.97<H>    Ca    9.5      08 Mar 2018 06:42  Phos  5.3     03-08  Mg     3.1     03-08    TPro  6.7  /  Alb  2.6<L>  /  TBili  0.2  /  DBili  x   /  AST  21  /  ALT  <5<L>  /  AlkPhos  303<H>  03-08    PT/INR - ( 08 Mar 2018 06:42 )   PT: 10.2 sec;   INR: 0.94 ratio         PTT - ( 08 Mar 2018 06:42 )  PTT:28.8 sec    Plan is for tunnelled HD catheter placement. The full procedure/ risks/ benefits/ alternatives were discussed with the pt's  Mohsen Barraza and Informed consent obtained. All questions and concerns have been addressed at this time.

## 2018-03-08 NOTE — PROGRESS NOTE ADULT - ASSESSMENT
68F PMH HTN, DMT2, Breast Cancer (diagnosed in Feb 2017) currently on trastuzumab (last dose Jan 20th), originally presented 1/30 with fever found to have influenza subsequently went into PEA arrest x 2 , complicated by bilateral pneumothoraces with pneumomediastinum s/p 3 chest tubes, and also new ESRD requiring almost daily HD, readmitted to the MICU for hypoxic respiratory failure likely 2/2 aspiration pneumonia with MSSA bacteremia and pneumonia, extubated on 3/1, with stable respiratory status  clinically improved  no resp distress, though lethargic  NGt feeds  MSSA bacteremia -cleared since 2/22    Dialysis being held: Cefazolin dose adjusted  continue Cefazolin   through 3/22/18

## 2018-03-08 NOTE — PROCEDURE NOTE - NSSITEPREP_SKIN_A_CORE
chlorhexidine
chlorhexidine/Adherence to aseptic technique: hand hygiene prior to donning barriers (gown, gloves), don cap and mask, sterile drape over patient

## 2018-03-08 NOTE — PROGRESS NOTE ADULT - PROBLEM SELECTOR PLAN 1
-test BG Q6h  -c/w NPH 15 units Q 6am (hold when tube feeds off)  -c/w NPH 18 units (12am, 12pm, 6pm-hold when tube feeds off)  -c/w Humalog moderate correction scale Q6h  -discussed plan w/pt's  and RCU team  pager: [phone]

## 2018-03-08 NOTE — PROGRESS NOTE ADULT - ASSESSMENT
67F PMH HTN, DMT2 (70/30 TDD: 90 on admission), Breast Cancer (diagnosed in Feb 2017) currently on Herceptin (last dose Jan 20th), originally presented 1/30 with Fever found to have influenza subsequently went into PEA arrest x 2 with ROSC, complicated by bilateral pneumothoraces s/p 3 chest tubes and paracentesis decompression of the abdomen, then found to have PE s/p tPA 1/30, also s/p new CVA, and also new ESRD requiring almost daily HD.     First MICU admission:  Patient was admitted to MICU for further management. Patient was found to have acute renal failure and shock liver. Nephrology was consulted and patient was started on dialysis. Patient was started on Tamiflu for flu and zosyn for possible aspiration pneumonia. Patients chest tubes and peritoneal drain were removed. Patient redeveloped a pneumothorax on the L side and chest tube was reinserted. 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 Urine legionella was negative and azithromycin was discontinued. Patient continued to be anuric and continued to receive dialysis. Shock liver resolved. Patient was extubated on 2/8/17. Patient completed a 5 day course of tamiflu and 7 day course of zosyn and did not show signs of infection. Patients pneumothorax on L side resolved and chest tube was removed. Patients mental status improved and was alert and oriented x1-2 and spontaneously moves all extremities. Patient had an NG tube placed pending an official speech and swallow eval. Patient failed speech and swallow an a repeat speech and swallow was planned. Patient was then transferred to the floors for further management.         Second MICU admission on 2/21.   RRT initially called this AM for worsening tachypnea. Pt was evaluated at bedside breathing around 30s with belly breathing, hypoxic to 85% on 40% FIO2, increased to 100% with improvement in oxygen saturation to 97%. Cxray read as worsening pulm edema. ABG showed hypercapnia. Renal called for urgent HD. Also concern for aspiration PNA as increasing density of RLL.     During MICU admission patient treated with 5 days of vanc and zosyn, found to have MSSA bacteremia and MSSA in the sputum, now transitioned to cefazolin until 3/22 (post dialysis). Bacteremia has cleared since 2/22. Patient was extubated on 3/1 to nasal cannula, and is saturating well. She has marked amounts of secretions, being sunctioned now q3-4 hours, and assisted with metaneb, acapella and incentive spirometer. Patient is now hemodynamically stable for transfer to the RCU, and requires adequate respiratory care to ensure she does not aspirate again.      3/5 Recieved to the RCU  3/6 HD shiley nonfunctional. LABS remain stable today. On for permacath on 3/8 with IR  3/7 No acute events overnight  3/8: Permacath placement by IR today. Will need HD today

## 2018-03-08 NOTE — PROVIDER CONTACT NOTE (MEDICATION) - ACTION/TREATMENT ORDERED:
Hold heparin sq @ 0600.
Will continue to monitor patient.
Will continue to monitor patient.
place mittens on now.

## 2018-03-09 LAB
ALBUMIN SERPL ELPH-MCNC: 2.7 G/DL — LOW (ref 3.3–5)
ALP SERPL-CCNC: 298 U/L — HIGH (ref 40–120)
ALT FLD-CCNC: <5 U/L RC — LOW (ref 10–45)
ANION GAP SERPL CALC-SCNC: 10 MMOL/L — SIGNIFICANT CHANGE UP (ref 5–17)
APTT BLD: 27.3 SEC — LOW (ref 27.5–37.4)
AST SERPL-CCNC: 22 U/L — SIGNIFICANT CHANGE UP (ref 10–40)
BILIRUB SERPL-MCNC: 0.2 MG/DL — SIGNIFICANT CHANGE UP (ref 0.2–1.2)
BUN SERPL-MCNC: 96 MG/DL — HIGH (ref 7–23)
CALCIUM SERPL-MCNC: 9.2 MG/DL — SIGNIFICANT CHANGE UP (ref 8.4–10.5)
CHLORIDE SERPL-SCNC: 103 MMOL/L — SIGNIFICANT CHANGE UP (ref 96–108)
CO2 SERPL-SCNC: 30 MMOL/L — SIGNIFICANT CHANGE UP (ref 22–31)
CREAT SERPL-MCNC: 5.34 MG/DL — HIGH (ref 0.5–1.3)
GLUCOSE BLDC GLUCOMTR-MCNC: 132 MG/DL — HIGH (ref 70–99)
GLUCOSE BLDC GLUCOMTR-MCNC: 143 MG/DL — HIGH (ref 70–99)
GLUCOSE BLDC GLUCOMTR-MCNC: 163 MG/DL — HIGH (ref 70–99)
GLUCOSE BLDC GLUCOMTR-MCNC: 209 MG/DL — HIGH (ref 70–99)
GLUCOSE SERPL-MCNC: 207 MG/DL — HIGH (ref 70–99)
HCT VFR BLD CALC: 22.7 % — LOW (ref 34.5–45)
HGB BLD-MCNC: 7.4 G/DL — LOW (ref 11.5–15.5)
INR BLD: 0.96 RATIO — SIGNIFICANT CHANGE UP (ref 0.88–1.16)
MAGNESIUM SERPL-MCNC: 3.2 MG/DL — HIGH (ref 1.6–2.6)
MCHC RBC-ENTMCNC: 32.3 PG — SIGNIFICANT CHANGE UP (ref 27–34)
MCHC RBC-ENTMCNC: 32.6 GM/DL — SIGNIFICANT CHANGE UP (ref 32–36)
MCV RBC AUTO: 98.9 FL — SIGNIFICANT CHANGE UP (ref 80–100)
PHOSPHATE SERPL-MCNC: 6.1 MG/DL — HIGH (ref 2.5–4.5)
PLATELET # BLD AUTO: 314 K/UL — SIGNIFICANT CHANGE UP (ref 150–400)
POTASSIUM SERPL-MCNC: 4.3 MMOL/L — SIGNIFICANT CHANGE UP (ref 3.5–5.3)
POTASSIUM SERPL-SCNC: 4.3 MMOL/L — SIGNIFICANT CHANGE UP (ref 3.5–5.3)
PROT SERPL-MCNC: 6.9 G/DL — SIGNIFICANT CHANGE UP (ref 6–8.3)
PROTHROM AB SERPL-ACNC: 10.4 SEC — SIGNIFICANT CHANGE UP (ref 9.8–12.7)
RBC # BLD: 2.29 M/UL — LOW (ref 3.8–5.2)
RBC # FLD: 16.5 % — HIGH (ref 10.3–14.5)
SODIUM SERPL-SCNC: 143 MMOL/L — SIGNIFICANT CHANGE UP (ref 135–145)
WBC # BLD: 5.1 K/UL — SIGNIFICANT CHANGE UP (ref 3.8–10.5)
WBC # FLD AUTO: 5.1 K/UL — SIGNIFICANT CHANGE UP (ref 3.8–10.5)

## 2018-03-09 PROCEDURE — 99233 SBSQ HOSP IP/OBS HIGH 50: CPT | Mod: 25,GC

## 2018-03-09 PROCEDURE — 99232 SBSQ HOSP IP/OBS MODERATE 35: CPT

## 2018-03-09 PROCEDURE — 99497 ADVNCD CARE PLAN 30 MIN: CPT | Mod: GC

## 2018-03-09 PROCEDURE — 99233 SBSQ HOSP IP/OBS HIGH 50: CPT | Mod: GC

## 2018-03-09 RX ORDER — CARVEDILOL PHOSPHATE 80 MG/1
6.25 CAPSULE, EXTENDED RELEASE ORAL EVERY 12 HOURS
Qty: 0 | Refills: 0 | Status: DISCONTINUED | OUTPATIENT
Start: 2018-03-09 | End: 2018-03-11

## 2018-03-09 RX ORDER — CEFAZOLIN SODIUM 1 G
3000 VIAL (EA) INJECTION
Qty: 0 | Refills: 0 | Status: COMPLETED | OUTPATIENT
Start: 2018-03-09 | End: 2018-03-10

## 2018-03-09 RX ORDER — CEFAZOLIN SODIUM 1 G
2000 VIAL (EA) INJECTION
Qty: 0 | Refills: 0 | Status: DISCONTINUED | OUTPATIENT
Start: 2018-03-09 | End: 2018-03-13

## 2018-03-09 RX ADMIN — HUMAN INSULIN 18 UNIT(S): 100 INJECTION, SUSPENSION SUBCUTANEOUS at 23:21

## 2018-03-09 RX ADMIN — HEPARIN SODIUM 5000 UNIT(S): 5000 INJECTION INTRAVENOUS; SUBCUTANEOUS at 13:03

## 2018-03-09 RX ADMIN — Medication 4: at 07:00

## 2018-03-09 RX ADMIN — HUMAN INSULIN 18 UNIT(S): 100 INJECTION, SUSPENSION SUBCUTANEOUS at 12:59

## 2018-03-09 RX ADMIN — CARVEDILOL PHOSPHATE 6.25 MILLIGRAM(S): 80 CAPSULE, EXTENDED RELEASE ORAL at 18:31

## 2018-03-09 RX ADMIN — HEPARIN SODIUM 5000 UNIT(S): 5000 INJECTION INTRAVENOUS; SUBCUTANEOUS at 06:41

## 2018-03-09 RX ADMIN — ERYTHROPOIETIN 10000 UNIT(S): 10000 INJECTION, SOLUTION INTRAVENOUS; SUBCUTANEOUS at 11:18

## 2018-03-09 RX ADMIN — Medication 2: at 19:18

## 2018-03-09 RX ADMIN — CARVEDILOL PHOSPHATE 6.25 MILLIGRAM(S): 80 CAPSULE, EXTENDED RELEASE ORAL at 06:42

## 2018-03-09 RX ADMIN — HUMAN INSULIN 15 UNIT(S): 100 INJECTION, SUSPENSION SUBCUTANEOUS at 07:00

## 2018-03-09 RX ADMIN — PANTOPRAZOLE SODIUM 40 MILLIGRAM(S): 20 TABLET, DELAYED RELEASE ORAL at 11:49

## 2018-03-09 RX ADMIN — ONDANSETRON 4 MILLIGRAM(S): 8 TABLET, FILM COATED ORAL at 19:34

## 2018-03-09 RX ADMIN — CALAMINE 8% AND ZINC OXIDE 8% 1 APPLICATION(S): 160 LOTION TOPICAL at 11:50

## 2018-03-09 RX ADMIN — Medication 3 MILLILITER(S): at 06:16

## 2018-03-09 RX ADMIN — Medication 100 MILLIGRAM(S): at 06:45

## 2018-03-09 RX ADMIN — SODIUM CHLORIDE 3 MILLILITER(S): 9 INJECTION INTRAMUSCULAR; INTRAVENOUS; SUBCUTANEOUS at 13:10

## 2018-03-09 RX ADMIN — Medication 3 MILLILITER(S): at 22:04

## 2018-03-09 RX ADMIN — NYSTATIN CREAM 1 APPLICATION(S): 100000 CREAM TOPICAL at 18:30

## 2018-03-09 RX ADMIN — Medication 3 MILLILITER(S): at 13:10

## 2018-03-09 RX ADMIN — NYSTATIN CREAM 1 APPLICATION(S): 100000 CREAM TOPICAL at 06:42

## 2018-03-09 RX ADMIN — SODIUM CHLORIDE 3 MILLILITER(S): 9 INJECTION INTRAMUSCULAR; INTRAVENOUS; SUBCUTANEOUS at 22:05

## 2018-03-09 RX ADMIN — Medication 81 MILLIGRAM(S): at 11:49

## 2018-03-09 RX ADMIN — HEPARIN SODIUM 5000 UNIT(S): 5000 INJECTION INTRAVENOUS; SUBCUTANEOUS at 22:09

## 2018-03-09 RX ADMIN — SODIUM CHLORIDE 3 MILLILITER(S): 9 INJECTION INTRAMUSCULAR; INTRAVENOUS; SUBCUTANEOUS at 06:17

## 2018-03-09 RX ADMIN — HUMAN INSULIN 18 UNIT(S): 100 INJECTION, SUSPENSION SUBCUTANEOUS at 19:18

## 2018-03-09 NOTE — PROGRESS NOTE ADULT - ATTENDING COMMENTS
#RUSS- oligoanuric  ATN-  increase in UO per /daughter; HD held yesterday; cr still uptrending.plan HD today and then monitor electrolytes over the weekend  #access- had shiley- s/p permcath 3/9; outpt HD center to be setup by [facility]  #anemia -epo tiw; change to SC; check iron studies  #infection/bacteremia- on dialysis dosing for cefazolin;

## 2018-03-09 NOTE — SWALLOW BEDSIDE ASSESSMENT ADULT - SLP GENERAL OBSERVATIONS
Pt found in bed, sleeping upon contact. Daughter at bedside. Dialysis RN present to administer dialysis. PT provided therapy then SLP administered assessment. Initially, improvement in alertness noted post PT session however pt having difficulty sustaining alertness throughout evaluation. Pt able to be re-directed given verbal stimulation however at times required tactile stimulation. Pt able to follow simple 1-step commands ~50%. Able to establish eye contact given verbal cues. + Flat affect. + Hypophonia. Baseline cough evident.
Pt found in bed, sleeping.  present at bedside. + NG tube. Pt awakens to rigorous tactile stimulation. Eyes open briefly however patient unable to maintain sufficient alertness for completion of exam. + Flat affect. Requires cues to establish eye contact. Follows simple 1-step commands ~25%. Requires frequent re-direction to task 2/2 lethargy.
Pt supine in bed, 4L O2 via NC, bipap on standby. KFT in place. Family present at bedside. A&Ox2 (stated she was home) Pt able to communicate simple needs and follow simple directions for exam. patient appeared lethargic at times and required repetition and increased time to respond to commands.
Pt asleep in bed, woke to verbal stimuli, 3L O2 via NC. KFT in place. Family present at bedside. Pt able to communicate simple needs and follow simple directions for exam. + Abdominal breathing noted at baseline, RN: Jordan forman (pt cleared for exam, additionally daughter reported above is pt's baseline). + Baseline cough and wheezing which increased after PO intake.

## 2018-03-09 NOTE — SWALLOW BEDSIDE ASSESSMENT ADULT - SWALLOW EVAL: DIAGNOSIS
Limited exam 2/2 inconsistent participation 2/2 altered mentation. Pt presents with an oral and suspected pharyngeal dysphagia superimposed upon an altered mental status characterized by delayed oral transit time, delayed pharyngeal swallows and reduced hyolaryngeal excursion upon palpation given honey thick liquid.

## 2018-03-09 NOTE — SWALLOW BEDSIDE ASSESSMENT ADULT - SWALLOW EVAL: CURRENT DIET
NPO with NG tube
NPO, with non-oral nutrition/hydration/medications.
NPO with NG tube

## 2018-03-09 NOTE — SWALLOW BEDSIDE ASSESSMENT ADULT - ASPIRATION PRECAUTIONS
for secretions and enteral feeding/yes
for secretions and enteral feeding/yes
for secretions and enteral feeds/yes
for secretions and enteral feeds/yes

## 2018-03-09 NOTE — PROGRESS NOTE ADULT - ASSESSMENT
68F PMH HTN, DMT2, Breast Cancer (diagnosed in Feb 2017) currently on trastuzumab (last dose Jan 20th), originally presented 1/30 with fever found to have influenza subsequently went into PEA arrest x 2 , complicated by bilateral pneumothoraces with pneumomediastinum s/p 3 chest tubes, and also new ESRD requiring almost daily HD, readmitted to the MICU for hypoxic respiratory failure likely 2/2 aspiration pneumonia with MSSA bacteremia and pneumonia, extubated on 3/1, with stable respiratory status  clinically improved  no resp distress, though lethargic  NGt feeds  MSSA bacteremia -cleared since 2/22  Nephrology notes that patient is oligoanuric without evidence of recovery    ID "Cleared" for PEG placement if otherwise stable  continue Cefazolin   through 3/22/18    I will be out until 3/19; ID service will follow  Please call ID if needed over weekend

## 2018-03-09 NOTE — PROGRESS NOTE ADULT - ASSESSMENT
69 y/o F with T2DM uncontrolled on insulin, osteoporosis, HTN, here with acute respiratory failure 2/2 influenza s/p PEA arrest x 2, on month long tx for aspiration pna. Receiving continuous tube feeds at goal-tolerating well. BG values mostly at goal on present insulin regimen. BG goal (100-180mg/dl). No hypoglycemia.

## 2018-03-09 NOTE — PROGRESS NOTE ADULT - SUBJECTIVE AND OBJECTIVE BOX
Diabetes Follow up note:  Interval Hx:  67 year old female w/uncontrolled T2DM w/complications here w/Flu c/b PEA arrest and Acute respiratory failure seen after HD at bedside. Tolerating TF at goal for past 24 hours. BG values 130'[id number]'s on present insulin regimen. Pt seen at bedside w/ present.     Review of Systems:  Nodding yes/no to my questions today. Denies pain, n/v/abd pain. No hypoglycemia symptoms    MEDS:    insulin lispro (HumaLOG) corrective regimen sliding scale   SubCutaneous every 6 hours  insulin NPH human recombinant 18 Unit(s) SubCutaneous <User Schedule>  insulin NPH human recombinant 15 Unit(s) SubCutaneous <User Schedule>    ceFAZolin   IVPB 2000 milliGRAM(s) IV Intermittent <User Schedule>  ceFAZolin   IVPB 3000 milliGRAM(s) IV Intermittent <User Schedule>    Allergies    doxycycline (Rash)  isoniazid (Rash)  NIFEdipine (Urticaria; Hives)  vitamin E (Short breath; Urticaria; Hives)        PE:  General: Female lying in bed. NAD.   Vital Signs Last 24 Hrs  T(C): 36.6 (09 Mar 2018 13:30), Max: 37.1 (08 Mar 2018 21:29)  T(F): 97.9 (09 Mar 2018 13:30), Max: 98.8 (08 Mar 2018 21:29)  HR: 105 (09 Mar 2018 13:30) (80 - 105)  BP: 112/63 (09 Mar 2018 13:30) (112/63 - 168/68)  BP(mean): --  RR: 18 (09 Mar 2018 13:30) (18 - 20)  SpO2: 100% (09 Mar 2018 13:30) (97% - 100%)  Abd: Soft, NT,ND, NGT in place  Extremities: Warm. No edema x 4 ext.   Neuro: Awake. Nodding appropriately to questions but not verbal at time of visit.     LABS:    POCT Blood Glucose.: 132 mg/dL (03-09-18 @ 12:01)  POCT Blood Glucose.: 209 mg/dL (03-09-18 @ 06:48)  POCT Blood Glucose.: 169 mg/dL (03-08-18 @ 23:17)  POCT Blood Glucose.: 208 mg/dL (03-08-18 @ 17:54)  POCT Blood Glucose.: 147 mg/dL (03-08-18 @ 12:23)  POCT Blood Glucose.: 93 mg/dL (03-08-18 @ 05:50)  POCT Blood Glucose.: 179 mg/dL (03-07-18 @ 23:34)  POCT Blood Glucose.: 143 mg/dL (03-07-18 @ 17:36)  POCT Blood Glucose.: 154 mg/dL (03-07-18 @ 12:25)  POCT Blood Glucose.: 164 mg/dL (03-07-18 @ 05:55)  POCT Blood Glucose.: 117 mg/dL (03-06-18 @ 23:45)  POCT Blood Glucose.: 102 mg/dL (03-06-18 @ 17:39)                            7.4    5.1   )-----------( 314      ( 09 Mar 2018 07:10 )             22.7       03-09    143  |  103  |  96<H>  ----------------------------<  207<H>  4.3   |  30  |  5.34<H>    Ca    9.2      09 Mar 2018 07:10  Phos  6.1     03-09  Mg     3.2     03-09    TPro  6.9  /  Alb  2.7<L>  /  TBili  0.2  /  DBili  x   /  AST  22  /  ALT  <5<L>  /  AlkPhos  298<H>  03-09      Hemoglobin A1C, Whole Blood: 8.6 % <H> [4.0 - 5.6] (01-31-18 @ 07:15)  Hemoglobin A1C, Whole Blood: 8.7 % <H> [4.0 - 5.6] (01-31-18 @ 05:51)            Contact number: marcelino [phone] or [phone]

## 2018-03-09 NOTE — PROGRESS NOTE ADULT - ASSESSMENT
67F PMH HTN, DMT2 (70/30 TDD: 90 on admission), Breast Cancer (diagnosed in Feb 2017) currently on Herceptin (last dose Jan 20th), originally presented 1/30 with Fever found to have influenza subsequently went into PEA arrest x 2 with ROSC, complicated by bilateral pneumothoraces s/p 3 chest tubes and paracentesis decompression of the abdomen, then found to have PE s/p tPA 1/30, also s/p new CVA, and also new ESRD requiring almost daily HD.     First MICU admission:  Patient was admitted to MICU for further management. Patient was found to have acute renal failure and shock liver. Nephrology was consulted and patient was started on dialysis. Patient was started on Tamiflu for flu and zosyn for possible aspiration pneumonia. Patients chest tubes and peritoneal drain were removed. Patient redeveloped a pneumothorax on the L side and chest tube was reinserted. 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 Urine legionella was negative and azithromycin was discontinued. Patient continued to be anuric and continued to receive dialysis. Shock liver resolved. Patient was extubated on 2/8/17. Patient completed a 5 day course of tamiflu and 7 day course of zosyn and did not show signs of infection. Patients pneumothorax on L side resolved and chest tube was removed. Patients mental status improved and was alert and oriented x1-2 and spontaneously moves all extremities. Patient had an NG tube placed pending an official speech and swallow eval. Patient failed speech and swallow an a repeat speech and swallow was planned. Patient was then transferred to the floors for further management.         Second MICU admission on 2/21.   RRT initially called this AM for worsening tachypnea. Pt was evaluated at bedside breathing around 30s with belly breathing, hypoxic to 85% on 40% FIO2, increased to 100% with improvement in oxygen saturation to 97%. Cxray read as worsening pulm edema. ABG showed hypercapnia. Renal called for urgent HD. Also concern for aspiration PNA as increasing density of RLL.     During MICU admission patient treated with 5 days of vanc and zosyn, found to have MSSA bacteremia and MSSA in the sputum, now transitioned to cefazolin until 3/22 (post dialysis). Bacteremia has cleared since 2/22. Patient was extubated on 3/1 to nasal cannula, and is saturating well. She has marked amounts of secretions, being sunctioned now q3-4 hours, and assisted with metaneb, acapella and incentive spirometer. Patient is now hemodynamically stable for transfer to the RCU, and requires adequate respiratory care to ensure she does not aspirate again.      3/5 Recieved to the RCU  3/6 HD shiley nonfunctional. LABS remain stable today. On for permacath on 3/8 with IR  3/7 No acute events overnight  3/8: Permacath placement by IR today. Watched off HD  3/9: Being HD today

## 2018-03-09 NOTE — PROGRESS NOTE ADULT - SUBJECTIVE AND OBJECTIVE BOX
[facility] DIVISION OF KIDNEY DISEASES AND HYPERTENSION -- FOLLOW UP NOTE  --------------------------------------------------------------------------------  Chief Complaint:/subjective:    24 hour events:        PAST HISTORY  --------------------------------------------------------------------------------  No significant changes to PMH, PSH, FHx, SHx, unless otherwise noted    ALLERGIES & MEDICATIONS  --------------------------------------------------------------------------------  Allergies    doxycycline (Rash)  isoniazid (Rash)  NIFEdipine (Urticaria; Hives)  vitamin E (Short breath; Urticaria; Hives)    Intolerances      Standing Inpatient Medications  ALBUTerol/ipratropium for Nebulization 3 milliLiter(s) Nebulizer every 8 hours  aspirin  chewable 81 milliGRAM(s) Oral daily  calamine Lotion 1 Application(s) Topical daily  carvedilol 6.25 milliGRAM(s) Oral every 12 hours  ceFAZolin   IVPB 2000 milliGRAM(s) IV Intermittent <User Schedule>  ceFAZolin   IVPB 3000 milliGRAM(s) IV Intermittent <User Schedule>  dextrose 5%. 1000 milliLiter(s) IV Continuous <Continuous>  dextrose 5%. 1000 milliLiter(s) IV Continuous <Continuous>  dextrose 5%. 1000 milliLiter(s) IV Continuous <Continuous>  dextrose 50% Injectable 12.5 Gram(s) IV Push once  dextrose 50% Injectable 25 Gram(s) IV Push once  dextrose 50% Injectable 25 Gram(s) IV Push once  epoetin odalis Injectable 79440 Unit(s) IV Push <User Schedule>  heparin  Injectable 5000 Unit(s) SubCutaneous every 8 hours  insulin lispro (HumaLOG) corrective regimen sliding scale   SubCutaneous every 6 hours  insulin NPH human recombinant 18 Unit(s) SubCutaneous <User Schedule>  insulin NPH human recombinant 15 Unit(s) SubCutaneous <User Schedule>  nystatin Powder 1 Application(s) Topical two times a day  ondansetron Injectable 4 milliGRAM(s) IV Push once  pantoprazole  Injectable 40 milliGRAM(s) IV Push daily  sodium chloride 3%  Inhalation 3 milliLiter(s) Inhalation three times a day    PRN Inpatient Medications  chlorhexidine 0.12% Liquid 15 milliLiter(s) Swish and Spit every 4 hours PRN  dextrose Gel 1 Dose(s) Oral once PRN  glucagon  Injectable 1 milliGRAM(s) IntraMuscular once PRN  hydrocortisone 1% Ointment 1 Application(s) Topical every 8 hours PRN  midodrine 10 milliGRAM(s) Oral every other day PRN  ondansetron Injectable 4 milliGRAM(s) IV Push every 6 hours PRN      REVIEW OF SYSTEMS  --------------------------------------------------------------------------------  Gen: No weight changes, fatigue, fevers/chills, weakness  Skin: No rashes  Head/Eyes/Ears/Mouth: No headache;   Respiratory: No dyspnea, cough  CV: No chest pain, PND, orthopnea  GI: No abdominal pain, diarrhea, constipation, nausea, vomiting  : No increased frequency, dysuria, hematuria, nocturia  MSK: No joint pain/swelling; no back pain; no edema  Neuro: No dizziness/lightheadedness, weakness  Heme: No easy bruising or bleeding  Psych: No significant nervousness, anxiety, stress, depression    All other systems were reviewed and are negative, except as noted.    VITALS/PHYSICAL EXAM  --------------------------------------------------------------------------------  T(C): 37.1 (03-09-18 @ 10:25), Max: 37.1 (03-08-18 @ 21:29)  HR: 93 (03-09-18 @ 10:25) (80 - 96)  BP: 145/58 (03-09-18 @ 10:25) (145/58 - 168/68)  RR: 18 (03-09-18 @ 10:25) (18 - 20)  SpO2: 97% (03-09-18 @ 10:25) (97% - 100%)  Wt(kg): --  Adult Advanced Hemodynamics Last 24 Hrs  ABP: --  ABP(mean): --  CVP(mm Hg): --  CO: --  CI: --  PA: --  PA(mean): --  PCWP: --  SVR: --  SVRI: --        03-08-18 @ 07:01  -  03-09-18 @ 07:00  --------------------------------------------------------  IN: 1050 mL / OUT: 0 mL / NET: 1050 mL      Physical Exam:  	Gen: NAD, well-appearing  	HEENT: PERRL, supple neck, no jvp  	Pulm: CTA B/L  	CV: RRR, S1S2; no rub  	Back: No spinal or CVA tenderness; no sacral edema  	Abd: +BS, soft, nontender/nondistended  	: No suprapubic tenderness  	Ext: no edema  	Neuro: No focal deficits, intact gait  	Psych: Normal affect and mood  	Skin: Warm, without rashes  	Vascular access:+PC    LABS/STUDIES  --------------------------------------------------------------------------------              7.4    5.1   >-----------<  314      [03-09-18 @ 07:10]              22.7     Hemoglobin: 7.4 g/dL (03-09-18 @ 07:10)  Hemoglobin: 7.2 g/dL (03-08-18 @ 06:42)    Platelet Count - Automated: 314 K/uL (03-09-18 @ 07:10)  Platelet Count - Automated: 310 K/uL (03-08-18 @ 06:42)    143  |  103  |  96  ----------------------------<  207      [03-09-18 @ 07:10]  4.3   |  30  |  5.34        Ca     9.2     [03-09-18 @ 07:10]      Mg     3.2     [03-09-18 @ 07:10]      Phos  6.1     [03-09-18 @ 07:10]    TPro  6.9  /  Alb  2.7  /  TBili  0.2  /  DBili  x   /  AST  22  /  ALT  <5  /  AlkPhos  298  [03-09-18 @ 07:10]    PT/INR: PT 10.4 , INR 0.96       [03-09-18 @ 07:10]  PTT: 27.3       [03-09-18 @ 07:10]      Creatinine Trend:  SCr 5.34 [03-09 @ 07:10]  SCr 4.97 [03-08 @ 06:42]  SCr 4.46 [03-07 @ 06:31]  SCr 4.81 [03-06 @ 05:58]  SCr 3.98 [03-05 @ 00:25]        Iron 32, TIBC 248, %sat 13      [03-07-18 @ 14:34]  Ferritin 174      [03-07-18 @ 14:34]  HbA1c 8.6      [01-31-18 @ 07:15]  Lipid: chol 183, , HDL 10,       [02-07-18 @ 13:34]    HBsAb <3.0      [02-28-18 @ 20:23]  HBsAg Nonreact      [02-28-18 @ 20:23]  HCV 0.24, Nonreact      [02-28-18 @ 20:23]

## 2018-03-09 NOTE — SWALLOW BEDSIDE ASSESSMENT ADULT - MUCOSAL QUALITY
pink, moist  dried secretions along posterior lingual surface and palate. clinician provided oral care.
+ retained secretions in oral cavity  dried white/yellow secretions along lingual surface. patient would not allow clinician to administer oral care.
WFL
WFL

## 2018-03-09 NOTE — PROGRESS NOTE ADULT - ATTENDING COMMENTS
Pt seen and examined, agree with above. 67F with HTN, DM 2,  Breast Cancer on Herceptin, now with prolonged hospital course due to acute resp failure with hypoxia 2/2 influenza A  and superimposed bacterial pneumonia, septic shock, PEA arrest x2, pneumothorax requiring chest tube placement, and ATN requiring HD. Currently stable resp status on NC, secretions improving with pulm toilet. HD per renal, UO slowly improving. Oropharyngeal dysphagia on NGT feeds, plan for swallow evalv on Monday prior to decision regarding PEG.  Stable hemodynamics.  and daughter updated on plan of care at bedside. Advanced care planning time spent: 35 mins Pt seen and examined, agree with above. 67F with HTN, DM 2,  Breast Cancer on Herceptin, now with prolonged hospital course due to acute resp failure with hypoxia 2/2 influenza A  and superimposed bacterial pneumonia, septic shock, PEA arrest x2, pneumothorax requiring chest tube placement, and ATN requiring HD. Currently stable resp status on NC, secretions improving with pulm toilet. HD per renal, UO slowly improving. Watershed infarcts, stable neuro exam .Oropharyngeal dysphagia on NGT feeds, plan for swallow evalv on Monday prior to decision regarding PEG.  Stable hemodynamics.  and daughter updated on plan of care at bedside. Advanced care planning time spent: 35 mins

## 2018-03-09 NOTE — SWALLOW BEDSIDE ASSESSMENT ADULT - SWALLOW EVAL: RECOMMENDED DIET
NPO, with non-oral nutrition/hydration/medications. This service to reassess candidacy for initiating PO feeding on 3/9/18.
Continue NPO, with non-oral nutrition/hydration/medications pending FEES
Continue NPO, with non-oral nutrition/hydration/medications.
NPO, with non-oral nutrition/hydration/medications.

## 2018-03-09 NOTE — SWALLOW BEDSIDE ASSESSMENT ADULT - ASR SWALLOW RECOMMEND DIAG
defer at this time
Case d/w daughter at bedside and NP Amena. FEES scheduled for Monday 3/12/18./FEES
FEES
Defer objective testing at present time as unlikely to .

## 2018-03-09 NOTE — SWALLOW BEDSIDE ASSESSMENT ADULT - SWALLOW EVAL: PATIENT/FAMILY GOALS STATEMENT
"plain water"
Pt's family denied history of dysphagia and pt has not been previously evaluated by an SLP for swallowing.
 at bedside stating "her sleep is off, can you come back this evening"
Pt's family denied history of dysphagia and pt has not been previously evaluated by an SLP for swallowing.

## 2018-03-09 NOTE — PROGRESS NOTE ADULT - PROBLEM SELECTOR PLAN 8
- continue feeds via NG, Nepro @ 40cc/hr x 24 hrs recommended by nutrition  - speech and swallow eval today

## 2018-03-09 NOTE — SWALLOW BEDSIDE ASSESSMENT ADULT - SPECIFY REASON(S)
to subjectively assess the swallow mechanism r/o dysphagia
to subjectively assess the swallow mechanism r/o dysphagia
to assess the swallow mechanism; r/o dysphagia.
to subjectively assess the swallow mechanism r/o dysphagia

## 2018-03-09 NOTE — PROGRESS NOTE ADULT - SUBJECTIVE AND OBJECTIVE BOX
Patient is a 68y old  Female who presents with a chief complaint of Fever, total body weakness (02 Feb 2018 13:44)      Interval Events:    REVIEW OF SYSTEMS:  [ ] Positive  [ x] All other systems negative  [ ] Unable to assess ROS because ________    Vital Signs Last 24 Hrs  T(C): 37.1 (03-09-18 @ 04:24), Max: 37.1 (03-08-18 @ 21:29)  T(F): 98.8 (03-09-18 @ 04:24), Max: 98.8 (03-08-18 @ 21:29)  HR: 82 (03-09-18 @ 06:18) (80 - 96)  BP: 168/68 (03-09-18 @ 04:24) (152/70 - 169/78)  RR: 20 (03-09-18 @ 04:24) (17 - 20)  SpO2: 100% (03-09-18 @ 04:24) (100% - 100%)    PHYSICAL EXAM:  HEENT:   [ ]Tracheostomy:n/a  [ ]Pupils equal  [ ]No oral lesions  [ ]Abnormal    SKIN  [x ]No Rash  [ ] Abnormal  [ ] pressure    CARDIAC  [x ]Regular  [ ]Abnormal    PULMONARY  [x ]Bilateral Clear Breath Sounds  [ ]Normal Excursion  [ ]Abnormal    GI  [x ]PEG      [x ] +BS		              [ x]Soft, nondistended, nontender	  [ ]Abnormal    MUSCULOSKELETAL                                   [ ]Bedbound                 [ ]Abnormal    [ ]Ambulatory/OOB to chair                           EXTREMITIES                                         [x ]Normal  [ ]Edema                           NEUROLOGIC  [ ] Normal, non focal  [x ] Focal findings: RLE+ RUE weakness    PSYCHIATRIC  [ x]Alert and appropriate  [ ] Sedated	 [ ]Agitated    :  Wheeler: [ ] Yes, if yes: Date of Placement:                   [ x ] No    LINES: Central Lines [ x] Yes, if yes: Date of Placement 3/8 Perma cath by Josefina MURILLO placed prior to hospital admission                                     [  ] No    HOSPITAL MEDICATIONS:  MEDICATIONS  (STANDING):  ALBUTerol/ipratropium for Nebulization 3 milliLiter(s) Nebulizer every 8 hours  aspirin  chewable 81 milliGRAM(s) Oral daily  calamine Lotion 1 Application(s) Topical daily  carvedilol 6.25 milliGRAM(s) Oral every 12 hours  ceFAZolin   IVPB 500 milliGRAM(s) IV Intermittent every 12 hours  dextrose 5%. 1000 milliLiter(s) (50 mL/Hr) IV Continuous <Continuous>  dextrose 5%. 1000 milliLiter(s) (50 mL/Hr) IV Continuous <Continuous>  dextrose 5%. 1000 milliLiter(s) (50 mL/Hr) IV Continuous <Continuous>  dextrose 50% Injectable 12.5 Gram(s) IV Push once  dextrose 50% Injectable 25 Gram(s) IV Push once  dextrose 50% Injectable 25 Gram(s) IV Push once  epoetin odalis Injectable 86442 Unit(s) IV Push <User Schedule>  heparin  Injectable 5000 Unit(s) SubCutaneous every 8 hours  insulin lispro (HumaLOG) corrective regimen sliding scale   SubCutaneous every 6 hours  insulin NPH human recombinant 18 Unit(s) SubCutaneous <User Schedule>  insulin NPH human recombinant 15 Unit(s) SubCutaneous <User Schedule>  nystatin Powder 1 Application(s) Topical two times a day  ondansetron Injectable 4 milliGRAM(s) IV Push once  pantoprazole  Injectable 40 milliGRAM(s) IV Push daily  sodium chloride 3%  Inhalation 3 milliLiter(s) Inhalation three times a day    MEDICATIONS  (PRN):  chlorhexidine 0.12% Liquid 15 milliLiter(s) Swish and Spit every 4 hours PRN mouth hygiene  dextrose Gel 1 Dose(s) Oral once PRN Blood Glucose LESS THAN 70 milliGRAM(s)/deciliter  glucagon  Injectable 1 milliGRAM(s) IntraMuscular once PRN Glucose LESS THAN 70 milligrams/deciliter  hydrocortisone 1% Ointment 1 Application(s) Topical every 8 hours PRN Rash and/or Itching  midodrine 10 milliGRAM(s) Oral every other day PRN prior to dialysis  ondansetron Injectable 4 milliGRAM(s) IV Push every 6 hours PRN Nausea and/or Vomiting      LABS:                        7.4    5.1   )-----------( 314      ( 09 Mar 2018 07:10 )             22.7     03-09    143  |  103  |  96<H>  ----------------------------<  207<H>  4.3   |  30  |  5.34<H>    Ca    9.2      09 Mar 2018 07:10  Phos  6.1     03-09  Mg     3.2     03-09    TPro  6.9  /  Alb  2.7<L>  /  TBili  0.2  /  DBili  x   /  AST  22  /  ALT  <5<L>  /  AlkPhos  298<H>  03-09    PT/INR - ( 09 Mar 2018 07:10 )   PT: 10.4 sec;   INR: 0.96 ratio         PTT - ( 09 Mar 2018 07:10 )  PTT:27.3 sec        CAPILLARY BLOOD GLUCOSE    MICROBIOLOGY:     RADIOLOGY:  [ ] Reviewed and interpreted by me Patient is a 68y old  Female who presents with a chief complaint of Fever, total body weakness (02 Feb 2018 13:44)      Interval Events: No acute events overnight    REVIEW OF SYSTEMS:  [ ] Positive  [ x] All other systems negative  [ ] Unable to assess ROS because ________    Vital Signs Last 24 Hrs  T(C): 37.1 (03-09-18 @ 04:24), Max: 37.1 (03-08-18 @ 21:29)  T(F): 98.8 (03-09-18 @ 04:24), Max: 98.8 (03-08-18 @ 21:29)  HR: 82 (03-09-18 @ 06:18) (80 - 96)  BP: 168/68 (03-09-18 @ 04:24) (152/70 - 169/78)  RR: 20 (03-09-18 @ 04:24) (17 - 20)  SpO2: 100% (03-09-18 @ 04:24) (100% - 100%)    PHYSICAL EXAM:  HEENT:   [ ]Tracheostomy:n/a  [x ]Pupils equal  [x ]No oral lesions  [ ]Abnormal    SKIN  [x ]No Rash  [ ] Abnormal  [ ] pressure    CARDIAC  [x ]Regular  [ ]Abnormal    PULMONARY  [x ]Bilateral Clear Breath Sounds  [ ]Normal Excursion  [ ]Abnormal    GI  [x ]PEG      [x ] +BS		              [ x]Soft, nondistended, nontender	  [ ]Abnormal    MUSCULOSKELETAL                                   [ ]Bedbound                 [ ]Abnormal    [ ]Ambulatory/OOB to chair                           EXTREMITIES                                         [x ]Normal  [ ]Edema                           NEUROLOGIC  [ ] Normal, non focal  [x ] Focal findings: RLE+ RUE weakness    PSYCHIATRIC  [ x]Alert and appropriate  [ ] Sedated	 [ ]Agitated    :  Wheeler: [ ] Yes, if yes: Date of Placement:                   [ x ] No    LINES: Central Lines [ x] Yes, if yes: Date of Placement 3/8 Perma cath by Josefina MURILLO placed prior to hospital admission                                     [  ] No    HOSPITAL MEDICATIONS:  MEDICATIONS  (STANDING):  ALBUTerol/ipratropium for Nebulization 3 milliLiter(s) Nebulizer every 8 hours  aspirin  chewable 81 milliGRAM(s) Oral daily  calamine Lotion 1 Application(s) Topical daily  carvedilol 6.25 milliGRAM(s) Oral every 12 hours  ceFAZolin   IVPB 500 milliGRAM(s) IV Intermittent every 12 hours  dextrose 5%. 1000 milliLiter(s) (50 mL/Hr) IV Continuous <Continuous>  dextrose 5%. 1000 milliLiter(s) (50 mL/Hr) IV Continuous <Continuous>  dextrose 5%. 1000 milliLiter(s) (50 mL/Hr) IV Continuous <Continuous>  dextrose 50% Injectable 12.5 Gram(s) IV Push once  dextrose 50% Injectable 25 Gram(s) IV Push once  dextrose 50% Injectable 25 Gram(s) IV Push once  epoetin odalis Injectable 73746 Unit(s) IV Push <User Schedule>  heparin  Injectable 5000 Unit(s) SubCutaneous every 8 hours  insulin lispro (HumaLOG) corrective regimen sliding scale   SubCutaneous every 6 hours  insulin NPH human recombinant 18 Unit(s) SubCutaneous <User Schedule>  insulin NPH human recombinant 15 Unit(s) SubCutaneous <User Schedule>  nystatin Powder 1 Application(s) Topical two times a day  ondansetron Injectable 4 milliGRAM(s) IV Push once  pantoprazole  Injectable 40 milliGRAM(s) IV Push daily  sodium chloride 3%  Inhalation 3 milliLiter(s) Inhalation three times a day    MEDICATIONS  (PRN):  chlorhexidine 0.12% Liquid 15 milliLiter(s) Swish and Spit every 4 hours PRN mouth hygiene  dextrose Gel 1 Dose(s) Oral once PRN Blood Glucose LESS THAN 70 milliGRAM(s)/deciliter  glucagon  Injectable 1 milliGRAM(s) IntraMuscular once PRN Glucose LESS THAN 70 milligrams/deciliter  hydrocortisone 1% Ointment 1 Application(s) Topical every 8 hours PRN Rash and/or Itching  midodrine 10 milliGRAM(s) Oral every other day PRN prior to dialysis  ondansetron Injectable 4 milliGRAM(s) IV Push every 6 hours PRN Nausea and/or Vomiting      LABS:                        7.4    5.1   )-----------( 314      ( 09 Mar 2018 07:10 )             22.7     03-09    143  |  103  |  96<H>  ----------------------------<  207<H>  4.3   |  30  |  5.34<H>    Ca    9.2      09 Mar 2018 07:10  Phos  6.1     03-09  Mg     3.2     03-09    TPro  6.9  /  Alb  2.7<L>  /  TBili  0.2  /  DBili  x   /  AST  22  /  ALT  <5<L>  /  AlkPhos  298<H>  03-09    PT/INR - ( 09 Mar 2018 07:10 )   PT: 10.4 sec;   INR: 0.96 ratio         PTT - ( 09 Mar 2018 07:10 )  PTT:27.3 sec        CAPILLARY BLOOD GLUCOSE    MICROBIOLOGY:     RADIOLOGY:  [ ] Reviewed and interpreted by me

## 2018-03-09 NOTE — SWALLOW BEDSIDE ASSESSMENT ADULT - SWALLOW EVAL: ORAL MUSCULATURE
anomalies present
limited exam 2/2 reduced command following/anomalies present
generally intact
generally intact

## 2018-03-09 NOTE — PROGRESS NOTE ADULT - PROBLEM SELECTOR PLAN 1
from ATN in setting of cardiac arrest. Pt continues to be oligo-anuric, requiring dialysis. No evidence of renal recovery at present.  Patient s/p tunneled HD catheter placement  by IR .

## 2018-03-09 NOTE — SWALLOW BEDSIDE ASSESSMENT ADULT - SLP PERTINENT HISTORY OF CURRENT PROBLEM
Please see addendum below re: PMHX and hospital course.
Please see below addendum re: PMHX and hospital course.
Please see BSE dated 2/14 for Hx
Please see addendum
Please see addendum
Please see below addendum re: PMHX and hospital course.

## 2018-03-09 NOTE — PROGRESS NOTE ADULT - SUBJECTIVE AND OBJECTIVE BOX
Follow Up:      Interval History/ROS:  at bedside notes that patient sleeps during day. NP reports swallowing assessment limited by mental status - formal assessment on 3/12- may require PEG. had HD today    Allergies  doxycycline (Rash)  isoniazid (Rash)  NIFEdipine (Urticaria; Hives)  vitamin E (Short breath; Urticaria; Hives)    ANTIMICROBIALS:  ceFAZolin   IVPB 2000 <User Schedule>  ceFAZolin   IVPB 3000 <User Schedule>    OTHER MEDS:  MEDICATIONS  (STANDING):  ALBUTerol/ipratropium for Nebulization 3 every 8 hours  aspirin  chewable 81 daily  carvedilol 6.25 every 12 hours  dextrose 50% Injectable 12.5 once  dextrose 50% Injectable 25 once  dextrose 50% Injectable 25 once  dextrose Gel 1 once PRN  epoetin odalis Injectable 26051 <User Schedule>  glucagon  Injectable 1 once PRN  heparin  Injectable 5000 every 8 hours  insulin lispro (HumaLOG) corrective regimen sliding scale  every 6 hours  insulin NPH human recombinant 18 <User Schedule>  insulin NPH human recombinant 15 <User Schedule>  midodrine 10 every other day PRN  ondansetron Injectable 4 every 6 hours PRN  ondansetron Injectable 4 once  pantoprazole  Injectable 40 daily  sodium chloride 3%  Inhalation 3 three times a day    Vital Signs Last 24 Hrs  T(C): 36.6 (09 Mar 2018 13:30), Max: 37.1 (08 Mar 2018 21:29)  T(F): 97.9 (09 Mar 2018 13:30), Max: 98.8 (08 Mar 2018 21:29)  HR: 105 (09 Mar 2018 13:30) (80 - 105)  BP: 112/63 (09 Mar 2018 13:30) (112/63 - 168/68)  BP(mean): --  RR: 18 (09 Mar 2018 13:30) (18 - 20)  SpO2: 100% (09 Mar 2018 13:30) (97% - 100%)    PHYSICAL EXAM:  General: WN/WD NAD, Non-toxic  Neurology: sleeping  soft NG feeding tube in left nares - feeds in progress  Respiratory: Clear to auscultation bilaterally, no wheeze, rhonchi, coarse quality  CV: RRR, S1S2,  Abdominal: Soft, Non-tender, non-distended  Extremities: No edema  Line Sites: Clear  Skin: No rash                        7.4    5.1   )-----------( 314      ( 09 Mar 2018 07:10 )             22.7       03-09    143  |  103  |  96<H>  ----------------------------<  207<H>  4.3   |  30  |  5.34<H>    Ca    9.2      09 Mar 2018 07:10  Phos  6.1     03-09  Mg     3.2     03-09    TPro  6.9  /  Alb  2.7<L>  /  TBili  0.2  /  DBili  x   /  AST  22  /  ALT  <5<L>  /  AlkPhos  298<H>  03-09      MICROBIOLOGY:  .Blood Blood-Venous  02-27-18   No growth at 5 days.  --  --      .Blood Blood-Peripheral  02-24-18   No growth at 5 days.  --  --      .Sputum Sputum trap  02-22-18   Numerous Staphylococcus aureus ***********Note************  This isolate demonstrates inducible  clindamycin resistance.  Clindamycin may still be effective in some patients.  No Normal Respiratory Cate present  --  Staphylococcus aureus    .Blood Blood  02-22-18   Growth in anaerobic bottle: Staphylococcus aureus  ***********Note************  This isolate demonstrates inducible  clindamycin resistance.  Clindamycin may still be effective in some patients.  "Due to technical problems, Proteus sp. will Not be reported as part of  the BCID panel until further notice"  ***Blood Panel PCR results on this specimen are available  approximately 3 hours after the Gram stain result.***  Gram stain, PCR, and/or culture results may not always  correspond due to difference in methodologies.  ************************************************************  This PCR assay was performed using [employer].  The following targets are tested for: Enterococcus,  vancomycin resistant enterococci, Listeria monocytogenes,  coagulase negative staphylococci, S. aureus,  methicillin resistant S. aureus, Streptococcus agalactiae  (Group B), S. pneumoniae, S. pyogenes (Group A),  Acinetobacter baumannii, Enterobacter cloacae, E. coli,  Klebsiella oxytoca, K. pneumoniae, Proteus sp.,  Serratia marcescens, Haemophilus influenzae,  Neisseria meningitidis, Pseudomonas aeruginosa, Candida  albicans, C. glabrata, C krusei, C parapsilosis,  C. tropicalis and the KPC resistance gene.  --  Blood Culture PCR  Staphylococcus aureus      .Blood Blood  02-22-18   No growth at 5 days.  --  --      .Blood Blood-Peripheral  02-19-18   No growth at 5 days.  --  --    RADIOLOGY:  < from: Xray Chest 1 View- PORTABLE-Urgent (02.24.18 @ 08:52) >  There is a new right-sided central venous line with its tip overlying the   proximal superior vena cava. The remainder of the life supporting devices   are unchanged in position. The cardiac silhouette is stable in size. No   pneumothorax is identified on this projection. There is persistent right   basilar opacity, atelectasis versus pneumonia.    < end of copied text >    Jas Durand MD; Division of Infectious Disease; Pager: [phone]; nights and weekends: [phone]

## 2018-03-09 NOTE — PROGRESS NOTE ADULT - PROBLEM SELECTOR PLAN 1
-test BG Q6h  -c/w NPH 15 units Q 6am (hold when tube feeds off)  -c/w NPH 18 units (12am, 12pm, 6pm-hold when tube feeds off)  -c/w Humalog moderate correction scale Q6h  -Will follow as needed this weekend if BG remain at goal  -contact endo team with any questions  pager: [phone]  discussed w/Pt's  and RN

## 2018-03-10 LAB
ALBUMIN SERPL ELPH-MCNC: 2.4 G/DL — LOW (ref 3.3–5)
ALP SERPL-CCNC: 289 U/L — HIGH (ref 40–120)
ALT FLD-CCNC: <5 U/L RC — LOW (ref 10–45)
ANION GAP SERPL CALC-SCNC: 10 MMOL/L — SIGNIFICANT CHANGE UP (ref 5–17)
APTT BLD: 25.5 SEC — LOW (ref 27.5–37.4)
AST SERPL-CCNC: 24 U/L — SIGNIFICANT CHANGE UP (ref 10–40)
BILIRUB SERPL-MCNC: 0.3 MG/DL — SIGNIFICANT CHANGE UP (ref 0.2–1.2)
BUN SERPL-MCNC: 40 MG/DL — HIGH (ref 7–23)
CALCIUM SERPL-MCNC: 8.9 MG/DL — SIGNIFICANT CHANGE UP (ref 8.4–10.5)
CHLORIDE SERPL-SCNC: 101 MMOL/L — SIGNIFICANT CHANGE UP (ref 96–108)
CK MB CFR SERPL CALC: 1.4 NG/ML — SIGNIFICANT CHANGE UP (ref 0–3.8)
CK SERPL-CCNC: 16 U/L — LOW (ref 25–170)
CO2 SERPL-SCNC: 29 MMOL/L — SIGNIFICANT CHANGE UP (ref 22–31)
CREAT SERPL-MCNC: 2.58 MG/DL — HIGH (ref 0.5–1.3)
GLUCOSE BLDC GLUCOMTR-MCNC: 112 MG/DL — HIGH (ref 70–99)
GLUCOSE BLDC GLUCOMTR-MCNC: 121 MG/DL — HIGH (ref 70–99)
GLUCOSE BLDC GLUCOMTR-MCNC: 184 MG/DL — HIGH (ref 70–99)
GLUCOSE BLDC GLUCOMTR-MCNC: 197 MG/DL — HIGH (ref 70–99)
GLUCOSE SERPL-MCNC: 113 MG/DL — HIGH (ref 70–99)
HCT VFR BLD CALC: 24.7 % — LOW (ref 34.5–45)
HGB BLD-MCNC: 7.7 G/DL — LOW (ref 11.5–15.5)
INR BLD: 1 RATIO — SIGNIFICANT CHANGE UP (ref 0.88–1.16)
MAGNESIUM SERPL-MCNC: 2.5 MG/DL — SIGNIFICANT CHANGE UP (ref 1.6–2.6)
MCHC RBC-ENTMCNC: 31.4 GM/DL — LOW (ref 32–36)
MCHC RBC-ENTMCNC: 31.5 PG — SIGNIFICANT CHANGE UP (ref 27–34)
MCV RBC AUTO: 101 FL — HIGH (ref 80–100)
PHOSPHATE SERPL-MCNC: 3.3 MG/DL — SIGNIFICANT CHANGE UP (ref 2.5–4.5)
PLATELET # BLD AUTO: 331 K/UL — SIGNIFICANT CHANGE UP (ref 150–400)
POTASSIUM SERPL-MCNC: 3.9 MMOL/L — SIGNIFICANT CHANGE UP (ref 3.5–5.3)
POTASSIUM SERPL-SCNC: 3.9 MMOL/L — SIGNIFICANT CHANGE UP (ref 3.5–5.3)
PROT SERPL-MCNC: 7.1 G/DL — SIGNIFICANT CHANGE UP (ref 6–8.3)
PROTHROM AB SERPL-ACNC: 10.8 SEC — SIGNIFICANT CHANGE UP (ref 9.8–12.7)
RBC # BLD: 2.45 M/UL — LOW (ref 3.8–5.2)
RBC # FLD: 16.8 % — HIGH (ref 10.3–14.5)
SODIUM SERPL-SCNC: 140 MMOL/L — SIGNIFICANT CHANGE UP (ref 135–145)
TROPONIN T SERPL-MCNC: 0.07 NG/ML — HIGH (ref 0–0.06)
WBC # BLD: 6.5 K/UL — SIGNIFICANT CHANGE UP (ref 3.8–10.5)
WBC # FLD AUTO: 6.5 K/UL — SIGNIFICANT CHANGE UP (ref 3.8–10.5)

## 2018-03-10 PROCEDURE — 93010 ELECTROCARDIOGRAM REPORT: CPT

## 2018-03-10 PROCEDURE — 71045 X-RAY EXAM CHEST 1 VIEW: CPT | Mod: 26

## 2018-03-10 PROCEDURE — 99233 SBSQ HOSP IP/OBS HIGH 50: CPT

## 2018-03-10 RX ORDER — ACETAMINOPHEN 500 MG
650 TABLET ORAL EVERY 6 HOURS
Qty: 0 | Refills: 0 | Status: DISCONTINUED | OUTPATIENT
Start: 2018-03-10 | End: 2018-03-16

## 2018-03-10 RX ORDER — ACETAMINOPHEN 500 MG
650 TABLET ORAL ONCE
Qty: 0 | Refills: 0 | Status: COMPLETED | OUTPATIENT
Start: 2018-03-10 | End: 2018-03-10

## 2018-03-10 RX ADMIN — HEPARIN SODIUM 5000 UNIT(S): 5000 INJECTION INTRAVENOUS; SUBCUTANEOUS at 23:29

## 2018-03-10 RX ADMIN — HEPARIN SODIUM 5000 UNIT(S): 5000 INJECTION INTRAVENOUS; SUBCUTANEOUS at 06:33

## 2018-03-10 RX ADMIN — SODIUM CHLORIDE 3 MILLILITER(S): 9 INJECTION INTRAMUSCULAR; INTRAVENOUS; SUBCUTANEOUS at 06:15

## 2018-03-10 RX ADMIN — HUMAN INSULIN 18 UNIT(S): 100 INJECTION, SUSPENSION SUBCUTANEOUS at 12:31

## 2018-03-10 RX ADMIN — Medication 81 MILLIGRAM(S): at 12:31

## 2018-03-10 RX ADMIN — Medication 200 MILLIGRAM(S): at 17:57

## 2018-03-10 RX ADMIN — Medication 3 MILLILITER(S): at 21:45

## 2018-03-10 RX ADMIN — CARVEDILOL PHOSPHATE 6.25 MILLIGRAM(S): 80 CAPSULE, EXTENDED RELEASE ORAL at 17:58

## 2018-03-10 RX ADMIN — Medication 3 MILLILITER(S): at 14:01

## 2018-03-10 RX ADMIN — SODIUM CHLORIDE 3 MILLILITER(S): 9 INJECTION INTRAMUSCULAR; INTRAVENOUS; SUBCUTANEOUS at 21:43

## 2018-03-10 RX ADMIN — Medication 3 MILLILITER(S): at 06:14

## 2018-03-10 RX ADMIN — NYSTATIN CREAM 1 APPLICATION(S): 100000 CREAM TOPICAL at 17:59

## 2018-03-10 RX ADMIN — Medication 2: at 18:12

## 2018-03-10 RX ADMIN — Medication 2: at 12:31

## 2018-03-10 RX ADMIN — NYSTATIN CREAM 1 APPLICATION(S): 100000 CREAM TOPICAL at 06:47

## 2018-03-10 RX ADMIN — CALAMINE 8% AND ZINC OXIDE 8% 1 APPLICATION(S): 160 LOTION TOPICAL at 13:30

## 2018-03-10 RX ADMIN — HUMAN INSULIN 18 UNIT(S): 100 INJECTION, SUSPENSION SUBCUTANEOUS at 23:30

## 2018-03-10 RX ADMIN — HUMAN INSULIN 15 UNIT(S): 100 INJECTION, SUSPENSION SUBCUTANEOUS at 06:44

## 2018-03-10 RX ADMIN — HUMAN INSULIN 18 UNIT(S): 100 INJECTION, SUSPENSION SUBCUTANEOUS at 18:13

## 2018-03-10 RX ADMIN — PANTOPRAZOLE SODIUM 40 MILLIGRAM(S): 20 TABLET, DELAYED RELEASE ORAL at 12:31

## 2018-03-10 RX ADMIN — Medication 650 MILLIGRAM(S): at 07:04

## 2018-03-10 RX ADMIN — SODIUM CHLORIDE 3 MILLILITER(S): 9 INJECTION INTRAMUSCULAR; INTRAVENOUS; SUBCUTANEOUS at 14:02

## 2018-03-10 RX ADMIN — HEPARIN SODIUM 5000 UNIT(S): 5000 INJECTION INTRAVENOUS; SUBCUTANEOUS at 13:30

## 2018-03-10 RX ADMIN — CARVEDILOL PHOSPHATE 6.25 MILLIGRAM(S): 80 CAPSULE, EXTENDED RELEASE ORAL at 06:33

## 2018-03-10 NOTE — PROVIDER CONTACT NOTE (CRITICAL VALUE NOTIFICATION) - ACTION/TREATMENT ORDERED:
Chemistry lab at [facility] reported serum troponin of 0.07. NP Andrew Guidry notified verbally. No new orders given presently.
will order T&S & STAT CBC. will continue to monitor.

## 2018-03-10 NOTE — PROGRESS NOTE ADULT - PROBLEM SELECTOR PLAN 8
- continue feeds via NG, Nepro @ 40cc/hr x 24 hrs recommended by nutrition  - Seen by speech and swallow and FEES on monday

## 2018-03-10 NOTE — CHART NOTE - NSCHARTNOTEFT_GEN_A_CORE
Notified by RN; pt. is complaining of chest tightness. Pt. seen and examined at bedside alongside daughter. As per daughter, mother is complaining of some tightness in the middle of the chest that she reports is secondary to her breathing. This tightness has been occurring over the last few days but pt. only reported on it tonight. Reports some difficulty of breathing. Endorses chronic N/V. Pt. currently lethargic from receiving dialysis in the previous day.    ICU Vital Signs Last 24 Hrs  T(C): 37.3 (10 Mar 2018 02:00), Max: 37.3 (10 Mar 2018 02:00)  T(F): 99.2 (10 Mar 2018 02:00), Max: 99.2 (10 Mar 2018 02:00)  HR: 98 (10 Mar 2018 02:00) (82 - 105)  BP: 147/70 (10 Mar 2018 02:00) (112/63 - 149/72)  RR: 18 (10 Mar 2018 02:00) (18 - 20)  SpO2: 95% (10 Mar 2018 02:00) (95% - 100%)  POCT Blood Glucose.: 112 mg/dL (10 Mar 2018 05:55)      PHYSICAL EXAM:  GENERAL: Lethargic. A&O w/ verbal stimuli   CHEST/LUNG: Clear to auscultation bilaterally; No wheeze  HEART: S1, S2. Regular rate and rhythm; No murmurs, rubs, or gallops  ABDOMEN: Soft, Nontender, Nondistended; Bowel sounds present    LABS:                        7.4    5.1   )-----------( 314      ( 09 Mar 2018 07:10 )             22.7     03-09    143  |  103  |  96<H>  ----------------------------<  207<H>  4.3   |  30  |  5.34<H>    Ca    9.2      09 Mar 2018 07:10  Phos  6.1     03-09  Mg     3.2     03-09    TPro  6.9  /  Alb  2.7<L>  /  TBili  0.2  /  DBili  x   /  AST  22  /  ALT  <5<L>  /  AlkPhos  298<H>  03-09    PT/INR - ( 09 Mar 2018 07:10 )   PT: 10.4 sec;   INR: 0.96 ratio         PTT - ( 09 Mar 2018 07:10 )  PTT:27.3 sec    Assessment/Plan:  Pt. is a Pt seen and examined, agree with above. 67F with HTN, DM 2,  Breast Cancer on Herceptin, now with prolonged hospital course due to acute resp failure with hypoxia 2/2 influenza A  and superimposed bacterial pneumonia, septic shock, PEA arrest x2, pneumothorax requiring chest tube placement, and ATN requiring HD is now complaining of chest pain.    1) Chest pain; r/o ACS possibly secondary to chronic SOB  -EKG STAT  -Cardiac enzymes STAT  -Duoneb treatment   -Tylenol ordered. Pt. currently denying pain.  -Continue to monitor pt. over night.  -F/u w/ primary team in AM.    -Marylu Chamorro PA-C. #[id number].

## 2018-03-10 NOTE — PROGRESS NOTE ADULT - ATTENDING COMMENTS
67F with HTN, DM 2,  Breast Cancer on Herceptin, now with prolonged hospital course due to acute resp failure with hypoxia 2/2 influenza A  and superimposed bacterial pneumonia, septic shock, PEA arrest x2, pneumothorax requiring chest tube placement, and ATN requiring HD. Currently stable resp status on NC, secretions improving with pulm toilet. HD per renal, UO slowly improving. Watershed infarcts, stable neuro exam .Oropharyngeal dysphagia on NGT feeds, plan for swallow evalv on Monday prior to decision regarding PEG.  Stable hemodynamics.

## 2018-03-10 NOTE — PROGRESS NOTE ADULT - PROBLEM SELECTOR PLAN 2
Continue with coreg  Remains HD stable  Chest discomfort overnight, EKG w/o acute changes and troponin 0.07. monitor closely

## 2018-03-10 NOTE — PROGRESS NOTE ADULT - SUBJECTIVE AND OBJECTIVE BOX
Patient is a 68y old  Female who presents with a chief complaint of Fever, total body weakness (02 Feb 2018 13:44)      Interval Events: chest discomfort over night    REVIEW OF SYSTEMS:  [ ] Positive  [ ] All other systems negative  [ ] Unable to assess ROS because ________  pt asleep time of exam   Vital Signs Last 24 Hrs  T(C): 36.9 (03-10-18 @ 08:34), Max: 37.3 (03-10-18 @ 02:00)  T(F): 98.5 (03-10-18 @ 08:34), Max: 99.2 (03-10-18 @ 02:00)  HR: 76 (03-10-18 @ 08:34) (76 - 105)  BP: 120/78 (03-10-18 @ 08:34) (112/63 - 149/72)  RR: 18 (03-10-18 @ 08:34) (18 - 20)  SpO2: 97% (03-10-18 @ 08:34) (95% - 100%)    PHYSICAL EXAM:  HEENT:   [x ]Tracheostomy:  [ ]Pupils equal  [x ]No oral lesions  [ ]Abnormal    SKIN  [ x]No Rash  [ ] Abnormal  [ ] pressure    CARDIAC  [ x]Regular, + SM   [ ]Abnormal    PULMONARY  [x ]Bilateral Clear Breath Sounds  [ ]Normal Excursion  [ ]Abnormal    GI  [x ]NGT      [ x] +BS		              [x ]Soft, nondistended, nontender	  [ ]Abnormal    MUSCULOSKELETAL                                   [ ]Bedbound                 [ ]Abnormal    [x]Ambulatory/OOB to chair                           EXTREMITIES                                         [ x]Normal  [ ]Edema                           NEUROLOGIC  [x ] Normal, non focal  [ ] Focal findings:    PSYCHIATRIC  [ ]Alert and appropriate  [ ] Sedated	 [ ]Agitated    :  [place]: [ ] YES, if yes: Date of Placement                        [ x ] NO      LINES: RIJ permacath  [x ]YES, if yes: Date of Placement 3/8; [facility] port 4/2017                    [ ] No    HOSPITAL MEDICATIONS:  MEDICATIONS  (STANDING):  ALBUTerol/ipratropium for Nebulization 3 milliLiter(s) Nebulizer every 8 hours  aspirin  chewable 81 milliGRAM(s) Oral daily  calamine Lotion 1 Application(s) Topical daily  carvedilol 6.25 milliGRAM(s) Oral every 12 hours  ceFAZolin   IVPB 2000 milliGRAM(s) IV Intermittent <User Schedule>  ceFAZolin   IVPB 3000 milliGRAM(s) IV Intermittent <User Schedule>  dextrose 5%. 1000 milliLiter(s) (50 mL/Hr) IV Continuous <Continuous>  dextrose 5%. 1000 milliLiter(s) (50 mL/Hr) IV Continuous <Continuous>  dextrose 5%. 1000 milliLiter(s) (50 mL/Hr) IV Continuous <Continuous>  dextrose 50% Injectable 12.5 Gram(s) IV Push once  dextrose 50% Injectable 25 Gram(s) IV Push once  dextrose 50% Injectable 25 Gram(s) IV Push once  epoetin odalis Injectable 74321 Unit(s) IV Push <User Schedule>  heparin  Injectable 5000 Unit(s) SubCutaneous every 8 hours  insulin lispro (HumaLOG) corrective regimen sliding scale   SubCutaneous every 6 hours  insulin NPH human recombinant 18 Unit(s) SubCutaneous <User Schedule>  insulin NPH human recombinant 15 Unit(s) SubCutaneous <User Schedule>  nystatin Powder 1 Application(s) Topical two times a day  ondansetron Injectable 4 milliGRAM(s) IV Push once  pantoprazole  Injectable 40 milliGRAM(s) IV Push daily  sodium chloride 3%  Inhalation 3 milliLiter(s) Inhalation three times a day    MEDICATIONS  (PRN):  chlorhexidine 0.12% Liquid 15 milliLiter(s) Swish and Spit every 4 hours PRN mouth hygiene  dextrose Gel 1 Dose(s) Oral once PRN Blood Glucose LESS THAN 70 milliGRAM(s)/deciliter  glucagon  Injectable 1 milliGRAM(s) IntraMuscular once PRN Glucose LESS THAN 70 milligrams/deciliter  hydrocortisone 1% Ointment 1 Application(s) Topical every 8 hours PRN Rash and/or Itching  midodrine 10 milliGRAM(s) Oral every other day PRN prior to dialysis  ondansetron Injectable 4 milliGRAM(s) IV Push every 6 hours PRN Nausea and/or Vomiting      LABS:                        7.7    6.5   )-----------( 331      ( 10 Mar 2018 07:19 )             24.7     03-10    140  |  101  |  40<H>  ----------------------------<  113<H>  3.9   |  29  |  2.58<H>    Ca    8.9      10 Mar 2018 07:20  Phos  3.3     03-10  Mg     2.5     03-10    TPro  7.1  /  Alb  2.4<L>  /  TBili  0.3  /  DBili  x   /  AST  24  /  ALT  <5<L>  /  AlkPhos  289<H>  03-10    PT/INR - ( 10 Mar 2018 07:19 )   PT: 10.8 sec;   INR: 1.00 ratio         PTT - ( 10 Mar 2018 07:19 )  PTT:25.5 sec        CAPILLARY BLOOD GLUCOSE    MICROBIOLOGY:     RADIOLOGY:  [ ] Reviewed and interpreted by me

## 2018-03-11 LAB
ALBUMIN SERPL ELPH-MCNC: 2.3 G/DL — LOW (ref 3.3–5)
ALBUMIN SERPL ELPH-MCNC: 2.5 G/DL — LOW (ref 3.3–5)
ALP SERPL-CCNC: 270 U/L — HIGH (ref 40–120)
ALP SERPL-CCNC: 294 U/L — HIGH (ref 40–120)
ALT FLD-CCNC: <5 U/L RC — LOW (ref 10–45)
ALT FLD-CCNC: <5 U/L RC — LOW (ref 10–45)
ANION GAP SERPL CALC-SCNC: 13 MMOL/L — SIGNIFICANT CHANGE UP (ref 5–17)
ANION GAP SERPL CALC-SCNC: 14 MMOL/L — SIGNIFICANT CHANGE UP (ref 5–17)
APTT BLD: 26.4 SEC — LOW (ref 27.5–37.4)
APTT BLD: 35.7 SEC — SIGNIFICANT CHANGE UP (ref 27.5–37.4)
AST SERPL-CCNC: 25 U/L — SIGNIFICANT CHANGE UP (ref 10–40)
AST SERPL-CCNC: 29 U/L — SIGNIFICANT CHANGE UP (ref 10–40)
BILIRUB SERPL-MCNC: 0.2 MG/DL — SIGNIFICANT CHANGE UP (ref 0.2–1.2)
BILIRUB SERPL-MCNC: 0.2 MG/DL — SIGNIFICANT CHANGE UP (ref 0.2–1.2)
BUN SERPL-MCNC: 61 MG/DL — HIGH (ref 7–23)
BUN SERPL-MCNC: 64 MG/DL — HIGH (ref 7–23)
CALCIUM SERPL-MCNC: 9.2 MG/DL — SIGNIFICANT CHANGE UP (ref 8.4–10.5)
CALCIUM SERPL-MCNC: 9.4 MG/DL — SIGNIFICANT CHANGE UP (ref 8.4–10.5)
CHLORIDE SERPL-SCNC: 101 MMOL/L — SIGNIFICANT CHANGE UP (ref 96–108)
CHLORIDE SERPL-SCNC: 103 MMOL/L — SIGNIFICANT CHANGE UP (ref 96–108)
CO2 SERPL-SCNC: 26 MMOL/L — SIGNIFICANT CHANGE UP (ref 22–31)
CO2 SERPL-SCNC: 27 MMOL/L — SIGNIFICANT CHANGE UP (ref 22–31)
CREAT SERPL-MCNC: 3.17 MG/DL — HIGH (ref 0.5–1.3)
CREAT SERPL-MCNC: 3.38 MG/DL — HIGH (ref 0.5–1.3)
GAS PNL BLDA: SIGNIFICANT CHANGE UP
GLUCOSE BLDC GLUCOMTR-MCNC: 107 MG/DL — HIGH (ref 70–99)
GLUCOSE BLDC GLUCOMTR-MCNC: 109 MG/DL — HIGH (ref 70–99)
GLUCOSE BLDC GLUCOMTR-MCNC: 159 MG/DL — HIGH (ref 70–99)
GLUCOSE BLDC GLUCOMTR-MCNC: 177 MG/DL — HIGH (ref 70–99)
GLUCOSE BLDC GLUCOMTR-MCNC: 182 MG/DL — HIGH (ref 70–99)
GLUCOSE SERPL-MCNC: 143 MG/DL — HIGH (ref 70–99)
GLUCOSE SERPL-MCNC: 186 MG/DL — HIGH (ref 70–99)
HCT VFR BLD CALC: 23.4 % — LOW (ref 34.5–45)
HCT VFR BLD CALC: 25.5 % — LOW (ref 34.5–45)
HGB BLD-MCNC: 7.4 G/DL — LOW (ref 11.5–15.5)
HGB BLD-MCNC: 8.2 G/DL — LOW (ref 11.5–15.5)
INR BLD: 0.97 RATIO — SIGNIFICANT CHANGE UP (ref 0.88–1.16)
INR BLD: 0.99 RATIO — SIGNIFICANT CHANGE UP (ref 0.88–1.16)
MAGNESIUM SERPL-MCNC: 2.7 MG/DL — HIGH (ref 1.6–2.6)
MAGNESIUM SERPL-MCNC: 2.8 MG/DL — HIGH (ref 1.6–2.6)
MCHC RBC-ENTMCNC: 31.4 PG — SIGNIFICANT CHANGE UP (ref 27–34)
MCHC RBC-ENTMCNC: 31.5 GM/DL — LOW (ref 32–36)
MCHC RBC-ENTMCNC: 31.8 PG — SIGNIFICANT CHANGE UP (ref 27–34)
MCHC RBC-ENTMCNC: 32.1 GM/DL — SIGNIFICANT CHANGE UP (ref 32–36)
MCV RBC AUTO: 99 FL — SIGNIFICANT CHANGE UP (ref 80–100)
MCV RBC AUTO: 99.9 FL — SIGNIFICANT CHANGE UP (ref 80–100)
PHOSPHATE SERPL-MCNC: 4.1 MG/DL — SIGNIFICANT CHANGE UP (ref 2.5–4.5)
PHOSPHATE SERPL-MCNC: 5.1 MG/DL — HIGH (ref 2.5–4.5)
PLATELET # BLD AUTO: 303 K/UL — SIGNIFICANT CHANGE UP (ref 150–400)
PLATELET # BLD AUTO: 327 K/UL — SIGNIFICANT CHANGE UP (ref 150–400)
POTASSIUM SERPL-MCNC: 4.1 MMOL/L — SIGNIFICANT CHANGE UP (ref 3.5–5.3)
POTASSIUM SERPL-MCNC: 4.1 MMOL/L — SIGNIFICANT CHANGE UP (ref 3.5–5.3)
POTASSIUM SERPL-SCNC: 4.1 MMOL/L — SIGNIFICANT CHANGE UP (ref 3.5–5.3)
POTASSIUM SERPL-SCNC: 4.1 MMOL/L — SIGNIFICANT CHANGE UP (ref 3.5–5.3)
PROT SERPL-MCNC: 6.7 G/DL — SIGNIFICANT CHANGE UP (ref 6–8.3)
PROT SERPL-MCNC: 7.3 G/DL — SIGNIFICANT CHANGE UP (ref 6–8.3)
PROTHROM AB SERPL-ACNC: 10.5 SEC — SIGNIFICANT CHANGE UP (ref 9.8–12.7)
PROTHROM AB SERPL-ACNC: 10.7 SEC — SIGNIFICANT CHANGE UP (ref 9.8–12.7)
RBC # BLD: 2.34 M/UL — LOW (ref 3.8–5.2)
RBC # BLD: 2.58 M/UL — LOW (ref 3.8–5.2)
RBC # FLD: 15.8 % — HIGH (ref 10.3–14.5)
RBC # FLD: 15.9 % — HIGH (ref 10.3–14.5)
SODIUM SERPL-SCNC: 142 MMOL/L — SIGNIFICANT CHANGE UP (ref 135–145)
SODIUM SERPL-SCNC: 142 MMOL/L — SIGNIFICANT CHANGE UP (ref 135–145)
WBC # BLD: 5.4 K/UL — SIGNIFICANT CHANGE UP (ref 3.8–10.5)
WBC # BLD: 5.9 K/UL — SIGNIFICANT CHANGE UP (ref 3.8–10.5)
WBC # FLD AUTO: 5.4 K/UL — SIGNIFICANT CHANGE UP (ref 3.8–10.5)
WBC # FLD AUTO: 5.9 K/UL — SIGNIFICANT CHANGE UP (ref 3.8–10.5)

## 2018-03-11 PROCEDURE — 31500 INSERT EMERGENCY AIRWAY: CPT | Mod: 59,GC

## 2018-03-11 PROCEDURE — 99291 CRITICAL CARE FIRST HOUR: CPT | Mod: 25

## 2018-03-11 PROCEDURE — 71045 X-RAY EXAM CHEST 1 VIEW: CPT | Mod: 26

## 2018-03-11 RX ORDER — NOREPINEPHRINE BITARTRATE/D5W 8 MG/250ML
0.01 PLASTIC BAG, INJECTION (ML) INTRAVENOUS
Qty: 8 | Refills: 0 | Status: DISCONTINUED | OUTPATIENT
Start: 2018-03-11 | End: 2018-03-12

## 2018-03-11 RX ORDER — HUMAN INSULIN 100 [IU]/ML
15 INJECTION, SUSPENSION SUBCUTANEOUS
Qty: 0 | Refills: 0 | Status: COMPLETED | OUTPATIENT
Start: 2018-03-11 | End: 2018-03-12

## 2018-03-11 RX ORDER — IPRATROPIUM/ALBUTEROL SULFATE 18-103MCG
3 AEROSOL WITH ADAPTER (GRAM) INHALATION EVERY 6 HOURS
Qty: 0 | Refills: 0 | Status: DISCONTINUED | OUTPATIENT
Start: 2018-03-11 | End: 2018-03-30

## 2018-03-11 RX ORDER — SODIUM CHLORIDE 9 MG/ML
3 INJECTION INTRAMUSCULAR; INTRAVENOUS; SUBCUTANEOUS
Qty: 0 | Refills: 0 | Status: DISCONTINUED | OUTPATIENT
Start: 2018-03-11 | End: 2018-03-30

## 2018-03-11 RX ORDER — PANTOPRAZOLE SODIUM 20 MG/1
40 TABLET, DELAYED RELEASE ORAL DAILY
Qty: 0 | Refills: 0 | Status: DISCONTINUED | OUTPATIENT
Start: 2018-03-11 | End: 2018-03-16

## 2018-03-11 RX ORDER — CEFAZOLIN SODIUM 1 G
3000 VIAL (EA) INJECTION
Qty: 0 | Refills: 0 | Status: DISCONTINUED | OUTPATIENT
Start: 2018-03-11 | End: 2018-03-13

## 2018-03-11 RX ORDER — PROPOFOL 10 MG/ML
20 INJECTION, EMULSION INTRAVENOUS
Qty: 500 | Refills: 0 | Status: DISCONTINUED | OUTPATIENT
Start: 2018-03-11 | End: 2018-03-16

## 2018-03-11 RX ORDER — HYDRALAZINE HCL 50 MG
10 TABLET ORAL EVERY 8 HOURS
Qty: 0 | Refills: 0 | Status: DISCONTINUED | OUTPATIENT
Start: 2018-03-11 | End: 2018-03-11

## 2018-03-11 RX ORDER — HYDROMORPHONE HYDROCHLORIDE 2 MG/ML
0.5 INJECTION INTRAMUSCULAR; INTRAVENOUS; SUBCUTANEOUS ONCE
Qty: 0 | Refills: 0 | Status: DISCONTINUED | OUTPATIENT
Start: 2018-03-11 | End: 2018-03-11

## 2018-03-11 RX ORDER — MORPHINE SULFATE 50 MG/1
1 CAPSULE, EXTENDED RELEASE ORAL ONCE
Qty: 0 | Refills: 0 | Status: DISCONTINUED | OUTPATIENT
Start: 2018-03-11 | End: 2018-03-11

## 2018-03-11 RX ADMIN — PANTOPRAZOLE SODIUM 40 MILLIGRAM(S): 20 TABLET, DELAYED RELEASE ORAL at 12:28

## 2018-03-11 RX ADMIN — HEPARIN SODIUM 5000 UNIT(S): 5000 INJECTION INTRAVENOUS; SUBCUTANEOUS at 06:24

## 2018-03-11 RX ADMIN — Medication 81 MILLIGRAM(S): at 12:27

## 2018-03-11 RX ADMIN — PROPOFOL 8.98 MICROGRAM(S)/KG/MIN: 10 INJECTION, EMULSION INTRAVENOUS at 22:02

## 2018-03-11 RX ADMIN — HEPARIN SODIUM 5000 UNIT(S): 5000 INJECTION INTRAVENOUS; SUBCUTANEOUS at 15:20

## 2018-03-11 RX ADMIN — Medication 2: at 17:14

## 2018-03-11 RX ADMIN — NYSTATIN CREAM 1 APPLICATION(S): 100000 CREAM TOPICAL at 06:36

## 2018-03-11 RX ADMIN — NYSTATIN CREAM 1 APPLICATION(S): 100000 CREAM TOPICAL at 17:23

## 2018-03-11 RX ADMIN — CALAMINE 8% AND ZINC OXIDE 8% 1 APPLICATION(S): 160 LOTION TOPICAL at 12:29

## 2018-03-11 RX ADMIN — HUMAN INSULIN 15 UNIT(S): 100 INJECTION, SUSPENSION SUBCUTANEOUS at 06:24

## 2018-03-11 RX ADMIN — HUMAN INSULIN 18 UNIT(S): 100 INJECTION, SUSPENSION SUBCUTANEOUS at 12:28

## 2018-03-11 RX ADMIN — CARVEDILOL PHOSPHATE 6.25 MILLIGRAM(S): 80 CAPSULE, EXTENDED RELEASE ORAL at 06:25

## 2018-03-11 RX ADMIN — HEPARIN SODIUM 5000 UNIT(S): 5000 INJECTION INTRAVENOUS; SUBCUTANEOUS at 21:58

## 2018-03-11 RX ADMIN — Medication 3 MILLILITER(S): at 13:03

## 2018-03-11 RX ADMIN — PROPOFOL 8.98 MICROGRAM(S)/KG/MIN: 10 INJECTION, EMULSION INTRAVENOUS at 15:20

## 2018-03-11 RX ADMIN — PANTOPRAZOLE SODIUM 40 MILLIGRAM(S): 20 TABLET, DELAYED RELEASE ORAL at 15:20

## 2018-03-11 RX ADMIN — Medication 1.4 MICROGRAM(S)/KG/MIN: at 15:21

## 2018-03-11 RX ADMIN — Medication 2: at 12:27

## 2018-03-11 NOTE — CHART NOTE - NSCHARTNOTEFT_GEN_A_CORE
RCU NP chart note    Call to see pt with c/o SOB, denies CP  Pt was in chair for 2 hrs and just returned to bed     pt appears labored breathing pattern   SOB not relieved after duoneb    lungs clear and diminished at bases  other exam w/o c/t earlier     /150  RR 26  O2 sat 80's with NC 6 L/M  O2 Sat still in the *0's with 100 % non re breather      RRT called for acute respiratory failure with hypoxia, possible aspiration during transferring  in and out of bed    at bedside and status informed   Pt was intubated and transferred to MICU

## 2018-03-11 NOTE — PROGRESS NOTE ADULT - ASSESSMENT
67F PMH HTN, DMT2 (70/30 TDD: 90 on admission), Breast Cancer (diagnosed in Feb 2017) currently on Herceptin (last dose Jan 20th), originally presented 1/30 with Fever found to have influenza subsequently went into PEA arrest x 2 with ROSC, complicated by bilateral pneumothoraces s/p 3 chest tubes and paracentesis decompression of the abdomen, then found to have PE s/p tPA 1/30, also s/p new CVA, and also new ESRD requiring almost daily HD.     First MICU admission:  Patient was admitted to MICU for further management. Patient was found to have acute renal failure and shock liver. Nephrology was consulted and patient was started on dialysis. Patient was started on Tamiflu for flu and zosyn for possible aspiration pneumonia. Patients chest tubes and peritoneal drain were removed. Patient redeveloped a pneumothorax on the L side and chest tube was reinserted. 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 Urine legionella was negative and azithromycin was discontinued. Patient continued to be anuric and continued to receive dialysis. Shock liver resolved. Patient was extubated on 2/8/17. Patient completed a 5 day course of tamiflu and 7 day course of zosyn and did not show signs of infection. Patients pneumothorax on L side resolved and chest tube was removed. Patients mental status improved and was alert and oriented x1-2 and spontaneously moves all extremities. Patient had an NG tube placed pending an official speech and swallow eval. Patient failed speech and swallow an a repeat speech and swallow was planned. Patient was then transferred to the floors for further management.         Second MICU admission on 2/21.   RRT initially called this AM for worsening tachypnea. Pt was evaluated at bedside breathing around 30s with belly breathing, hypoxic to 85% on 40% FIO2, increased to 100% with improvement in oxygen saturation to 97%. Cxray read as worsening pulm edema. ABG showed hypercapnia. Renal called for urgent HD. Also concern for aspiration PNA as increasing density of RLL.     During MICU admission patient treated with 5 days of vanc and zosyn, found to have MSSA bacteremia and MSSA in the sputum, now transitioned to cefazolin until 3/22 (post dialysis). Bacteremia has cleared since 2/22. Patient was extubated on 3/1 to nasal cannula, and is saturating well. She has marked amounts of secretions, being sunctioned now q3-4 hours, and assisted with metaneb, acapella and incentive spirometer. Patient is now hemodynamically stable for transfer to the RCU, and requires adequate respiratory care to ensure she does not aspirate again.      3/5 Recieved to the RCU  3/6 HD shiley nonfunctional. LABS remain stable today. On for permacath on 3/8 with IR  3/7 No acute events overnight  3/8: Permacath placement by IR today. Watched off HD  3/9: Being HD today 67F PMH HTN, DMT2 (70/30 TDD: 90 on admission), Breast Cancer (diagnosed in Feb 2017) currently on Herceptin (last dose Jan 20th), originally presented 1/30 with Fever found to have influenza subsequently went into PEA arrest x 2 with ROSC, complicated by bilateral pneumothoraces s/p 3 chest tubes and paracentesis decompression of the abdomen, then found to have PE s/p tPA 1/30, also s/p new CVA, and also new ESRD requiring almost daily HD.     First MICU admission:  Patient was admitted to MICU for further management. Patient was found to have acute renal failure and shock liver. Nephrology was consulted and patient was started on dialysis. Patient was started on Tamiflu for flu and zosyn for possible aspiration pneumonia. Patients chest tubes and peritoneal drain were removed. Patient redeveloped a pneumothorax on the L side and chest tube was reinserted. 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 Urine legionella was negative and azithromycin was discontinued. Patient continued to be anuric and continued to receive dialysis. Shock liver resolved. Patient was extubated on 2/8/17. Patient completed a 5 day course of tamiflu and 7 day course of zosyn and did not show signs of infection. Patients pneumothorax on L side resolved and chest tube was removed. Patients mental status improved and was alert and oriented x1-2 and spontaneously moves all extremities. Patient had an NG tube placed pending an official speech and swallow eval. Patient failed speech and swallow an a repeat speech and swallow was planned. Patient was then transferred to the floors for further management.         Second MICU admission on 2/21.   RRT initially called this AM for worsening tachypnea. Pt was evaluated at bedside breathing around 30s with belly breathing, hypoxic to 85% on 40% FIO2, increased to 100% with improvement in oxygen saturation to 97%. Cxray read as worsening pulm edema. ABG showed hypercapnia. Renal called for urgent HD. Also concern for aspiration PNA as increasing density of RLL.     During MICU admission patient treated with 5 days of vanc and zosyn, found to have MSSA bacteremia and MSSA in the sputum, now transitioned to cefazolin until 3/22 (post dialysis). Bacteremia has cleared since 2/22. Patient was extubated on 3/1 to nasal cannula, and is saturating well. She has marked amounts of secretions, being sunctioned now q3-4 hours, and assisted with metaneb, acapella and incentive spirometer. Patient is now hemodynamically stable for transfer to the RCU, and requires adequate respiratory care to ensure she does not aspirate again.      3/5 Recieved to the RCU  3/6 HD shiley nonfunctional. LABS remain stable today. On for permacath on 3/8 with IR  3/7 No acute events overnight  3/8: Permacath placement by IR today. Watched off HD  3/9: Being HD today  3/10 c/o chest discomfort EKG w/o acute change, CK MB normal, troponin 0.07. chest discomfort resolved after tylenol

## 2018-03-11 NOTE — CHART NOTE - NSCHARTNOTEFT_GEN_A_CORE
RRT called by  RN for hypoxia.    67F PMH HTN, DMT2 (70/30 TDD: 90 on admission), Breast Cancer (diagnosed in Feb 2017) currently on Herceptin (last dose Jan 20th), originally presented 1/30 with Fever found to have influenza subsequently went into PEA arrest x 2 with ROSC, complicated by bilateral pneumothoraces s/p 3 chest tubes and paracentesis decompression of the abdomen, then found to have PE s/p tPA 1/30, also s/p new CVA, and also new ESRD requiring almost daily HD.     On arrival to bedside patient O2=80% on 4L O2 NC, escalated immediately to 100% NRB with no improvement. Patient extremely lethargic and in significant respiratory distress. MICU fellow contacted for urgent intubation and patient ambu-bag'ed for O2 support while awaiting their arrival. Patient subsequently intubated by MICU fellow and transported to MICU in stable condition by MICU team.    Brooklynn Joy MD PGY3  [facility] [id number]

## 2018-03-11 NOTE — CHART NOTE - NSCHARTNOTEFT_GEN_A_CORE
CHIEF COMPLAINT: Patient is a 68y old  Female who presents with a chief complaint of Fever, total body weakness (02 Feb 2018 13:44)    67F PMH HTN, DMT2 (70/30 TDD: 90 on admission), Breast Cancer (diagnosed in Feb 2017) currently on Herceptin (last dose Jan 20th), originally presented 1/30 with Fever found to have influenza subsequently went into PEA arrest x 2 with ROSC, complicated by bilateral pneumothoraces s/p 3 chest tubes and paracentesis decompression of the abdomen, then found to have PE s/p tPA 1/30, also s/p new CVA, and also new ESRD requiring almost daily HD.     First MICU admission:  Patient was admitted to MICU for further management. Patient was found to have acute renal failure and shock liver. Nephrology was consulted and patient was started on dialysis. Patient was started on Tamiflu for flu and zosyn for possible aspiration pneumonia. Patients chest tubes and peritoneal drain were removed. Patient redeveloped a pneumothorax on the L side and chest tube was reinserted. 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 Urine legionella was negative and azithromycin was discontinued. Patient continued to be anuric and continued to receive dialysis. Shock liver resolved. Patient was extubated on 2/8/17. Patient completed a 5 day course of tamiflu and 7 day course of zosyn and did not show signs of infection. Patients pneumothorax on L side resolved and chest tube was removed. Patients mental status improved and was alert and oriented x1-2 and spontaneously moves all extremities. Patient had an NG tube placed pending an official speech and swallow eval. Patient failed speech and swallow an a repeat speech and swallow was planned. Patient was then transferred to the floors for further management.       Second MICU admission on 2/21.   RRT initially called this AM for worsening tachypnea. Pt was evaluated at bedside breathing around 30s with belly breathing, hypoxic to 85% on 40% FIO2, increased to 100% with improvement in oxygen saturation to 97%. Cxray read as worsening pulm edema. ABG showed hypercapnia. Renal called for urgent HD. Also concern for aspiration PNA as increasing density of RLL.     During MICU admission patient treated with 5 days of vanc and zosyn, found to have MSSA bacteremia and MSSA in the sputum, now transitioned to cefazolin until 3/22 (post dialysis). Bacteremia has cleared since 2/22. Patient was extubated on 3/1 to nasal cannula, and is saturating well. She has marked amounts of secretions, being suctioned now q3-4 hours, and assisted with metaneb, acapella and incentive spirometer.     Third MICU admission:  RRT called for hypoxia and tachypnea. Patient placed on NC, escalated to NRB with O2 sat 80%.Patient extremely lethargic and in significant respiratory distress. MICU fellow contacted for urgent intubation and patient ambu-bag'ed for O2 support while awaiting their arrival. Patient subsequently intubated by MICU fellow and transported to MICU in stable condition.     REVIEW OF SYSTEMS:  Constitutional: [ ] negative [ ] fevers [ ] chills [ ] weight loss [ ] weight gain  HEENT: [ ] negative [ ] dry eyes [ ] eye irritation [ ] postnasal drip [ ] nasal congestion  CV: [ ] negative  [ ] chest pain [ ] orthopnea [ ] palpitations [ ] murmur  Resp: [ ] negative [ ] cough [ ] shortness of breath [ ] dyspnea [ ] wheezing [ ] sputum [ ] hemoptysis  GI: [ ] negative [ ] nausea [ ] vomiting [ ] diarrhea [ ] constipation [ ] abd pain [ ] dysphagia   : [ ] negative [ ] dysuria [ ] nocturia [ ] hematuria [ ] increased urinary frequency  Musculoskeletal: [ ] negative [ ] back pain [ ] myalgias [ ] arthralgias [ ] fracture  Skin: [ ] negative [ ] rash [ ] itch  Neurological: [ ] negative [ ] headache [ ] dizziness [ ] syncope [ ] weakness [ ] numbness  Psychiatric: [ ] negative [ ] anxiety [ ] depression  Endocrine: [ ] negative [ ] diabetes [ ] thyroid problem  Hematologic/Lymphatic: [ ] negative [ ] anemia [ ] bleeding problem  Allergic/Immunologic: [ ] negative [ ] itchy eyes [ ] nasal discharge [ ] hives [ ] angioedema  [ ] All other systems negative  [ ] Unable to assess ROS because ________    OBJECTIVE:  ICU Vital Signs Last 24 Hrs  T(C): 36.9 (11 Mar 2018 13:47), Max: 38.3 (10 Mar 2018 20:47)  T(F): 98.5 (11 Mar 2018 13:47), Max: 100.9 (10 Mar 2018 20:47)  HR: 72 (11 Mar 2018 15:00) (72 - 107)  BP: 106/53 (11 Mar 2018 15:00) (75/40 - 189/151)  BP(mean): 76 (11 Mar 2018 15:00) (52 - 109)  ABP: --  ABP(mean): --  RR: 14 (11 Mar 2018 15:00) (14 - 22)  SpO2: 100% (11 Mar 2018 15:00) (93% - 100%)    Mode: AC/ CMV (Assist Control/ Continuous Mandatory Ventilation), RR (machine): 14, TV (machine): 450, FiO2: 100, PEEP: 5, ITime: 1, MAP: 12, PIP: 35    03-10 @ 06:01 - 03-11 @ 07:00  --------------------------------------------------------  IN: 1160 mL / OUT: 0 mL / NET: 1160 mL    03-11 @ 07:01 - 03-11 @ 15:03  --------------------------------------------------------  IN: 294.4 mL / OUT: 0 mL / NET: 294.4 mL      CAPILLARY BLOOD GLUCOSE      POCT Blood Glucose.: 177 mg/dL (11 Mar 2018 13:23)      PHYSICAL EXAM:  General:   HEENT:   Lymph Nodes:  Neck:   Respiratory:   Cardiovascular:   Abdomen:   Extremities:   Skin:   Neurological:  Psychiatry:    LINES:    HOSPITAL MEDICATIONS:  Standing Meds:  ALBUTerol/ipratropium for Nebulization 3 milliLiter(s) Nebulizer every 8 hours  aspirin  chewable 81 milliGRAM(s) Oral daily  calamine Lotion 1 Application(s) Topical daily  ceFAZolin   IVPB 2000 milliGRAM(s) IV Intermittent <User Schedule>  dextrose 5%. 1000 milliLiter(s) IV Continuous <Continuous>  dextrose 5%. 1000 milliLiter(s) IV Continuous <Continuous>  dextrose 5%. 1000 milliLiter(s) IV Continuous <Continuous>  dextrose 50% Injectable 12.5 Gram(s) IV Push once  dextrose 50% Injectable 25 Gram(s) IV Push once  dextrose 50% Injectable 25 Gram(s) IV Push once  epoetin odalis Injectable 87383 Unit(s) IV Push <User Schedule>  heparin  Injectable 5000 Unit(s) SubCutaneous every 8 hours  insulin lispro (HumaLOG) corrective regimen sliding scale   SubCutaneous every 6 hours  insulin NPH human recombinant 18 Unit(s) SubCutaneous <User Schedule>  insulin NPH human recombinant 15 Unit(s) SubCutaneous <User Schedule>  norepinephrine Infusion 0.01 MICROgram(s)/kG/Min IV Continuous <Continuous>  nystatin Powder 1 Application(s) Topical two times a day  pantoprazole  Injectable 40 milliGRAM(s) IV Push daily  propofol Infusion 20 MICROgram(s)/kG/Min IV Continuous <Continuous>  sodium chloride 3%  Inhalation 3 milliLiter(s) Inhalation three times a day      PRN Meds:  acetaminophen    Suspension. 650 milliGRAM(s) Oral every 6 hours PRN  chlorhexidine 0.12% Liquid 15 milliLiter(s) Swish and Spit every 4 hours PRN  dextrose Gel 1 Dose(s) Oral once PRN  glucagon  Injectable 1 milliGRAM(s) IntraMuscular once PRN  hydrocortisone 1% Ointment 1 Application(s) Topical every 8 hours PRN  ondansetron Injectable 4 milliGRAM(s) IV Push every 6 hours PRN      LABS:                        7.4    5.4   )-----------( 303      ( 11 Mar 2018 14:53 )             23.4     Hgb Trend: 7.4<--, 8.2<--, 7.7<--, 7.4<--, 7.2<--  03-11    142  |  101  |  61<H>  ----------------------------<  143<H>  4.1   |  27  |  3.17<H>    Ca    9.4      11 Mar 2018 11:21  Phos  4.1     03-11  Mg     2.7     03-11    TPro  7.3  /  Alb  2.5<L>  /  TBili  0.2  /  DBili  x   /  AST  29  /  ALT  <5<L>  /  AlkPhos  294<H>  03-11    Creatinine Trend: 3.17<--, 2.58<--, 5.34<--, 4.97<--, 4.46<--, 4.81<--  PT/INR - ( 11 Mar 2018 11:21 )   PT: 10.7 sec;   INR: 0.99 ratio         PTT - ( 11 Mar 2018 11:21 )  PTT:35.7 sec    Arterial Blood Gas:  03-11 @ 14:45  7.30/60/106/29/99/2.5  ABG lactate: --        MICROBIOLOGY:     RADIOLOGY:  [ ] Reviewed and interpreted by me    EKG: CHIEF COMPLAINT: Patient is a 68y old  Female who presents with a chief complaint of Fever, total body weakness (02 Feb 2018 13:44)    67F PMH HTN, DMT2 (70/30 TDD: 90 on admission), Breast Cancer (diagnosed in Feb 2017) currently on Herceptin (last dose Jan 20th), originally presented 1/30 with Fever found to have influenza subsequently went into PEA arrest x 2 with ROSC, complicated by bilateral pneumothoraces s/p 3 chest tubes and paracentesis decompression of the abdomen, then found to have PE s/p tPA 1/30, also s/p new CVA, and also new ESRD requiring almost daily HD.     First MICU admission:  Patient was admitted to MICU for further management. Patient was found to have acute renal failure and shock liver. Nephrology was consulted and patient was started on dialysis. Patient was started on Tamiflu for flu and zosyn for possible aspiration pneumonia. Patients chest tubes and peritoneal drain were removed. Patient redeveloped a pneumothorax on the L side and chest tube was reinserted. 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 Urine legionella was negative and azithromycin was discontinued. Patient continued to be anuric and continued to receive dialysis. Shock liver resolved. Patient was extubated on 2/8/17. Patient completed a 5 day course of tamiflu and 7 day course of zosyn and did not show signs of infection. Patients pneumothorax on L side resolved and chest tube was removed. Patients mental status improved and was alert and oriented x1-2 and spontaneously moves all extremities. Patient had an NG tube placed pending an official speech and swallow eval. Patient failed speech and swallow an a repeat speech and swallow was planned. Patient was then transferred to the floors for further management.       Second MICU admission on 2/21.   RRT initially called this AM for worsening tachypnea. Pt was evaluated at bedside breathing around 30s with belly breathing, hypoxic to 85% on 40% FIO2, increased to 100% with improvement in oxygen saturation to 97%. Cxray read as worsening pulm edema. ABG showed hypercapnia. Renal called for urgent HD. Also concern for aspiration PNA as increasing density of RLL.     During MICU admission patient treated with 5 days of vanc and zosyn, found to have MSSA bacteremia and MSSA in the sputum, now transitioned to cefazolin until 3/22 (post dialysis). Bacteremia has cleared since 2/22. Patient was extubated on 3/1 to nasal cannula, and is saturating well. She has marked amounts of secretions, being suctioned now q3-4 hours, and assisted with metaneb, acapella and incentive spirometer.     Third MICU admission:  RRT called for hypoxia and tachypnea. Patient placed on NC, escalated to NRB with O2 sat 80%.Patient extremely lethargic and in significant respiratory distress. MICU fellow contacted for urgent intubation and patient ambu-bag'ed for O2 support while awaiting their arrival. Patient subsequently intubated by MICU fellow and transported to MICU in stable condition. Hypoxia is likely related to aspiration event.    REVIEW OF SYSTEMS:  Constitutional: [ ] negative [ ] fevers [ ] chills [ ] weight loss [ ] weight gain  HEENT: [ ] negative [ ] dry eyes [ ] eye irritation [ ] postnasal drip [ ] nasal congestion  CV: [ ] negative  [ ] chest pain [ ] orthopnea [ ] palpitations [ ] murmur  Resp: [ ] negative [ ] cough [ ] shortness of breath [ ] dyspnea [ ] wheezing [ ] sputum [ ] hemoptysis  GI: [ ] negative [ ] nausea [ ] vomiting [ ] diarrhea [ ] constipation [ ] abd pain [ ] dysphagia   : [ ] negative [ ] dysuria [ ] nocturia [ ] hematuria [ ] increased urinary frequency  Musculoskeletal: [ ] negative [ ] back pain [ ] myalgias [ ] arthralgias [ ] fracture  Skin: [ ] negative [ ] rash [ ] itch  Neurological: [ ] negative [ ] headache [ ] dizziness [ ] syncope [ ] weakness [ ] numbness  Psychiatric: [ ] negative [ ] anxiety [ ] depression  Endocrine: [ ] negative [ ] diabetes [ ] thyroid problem  Hematologic/Lymphatic: [ ] negative [ ] anemia [ ] bleeding problem  Allergic/Immunologic: [ ] negative [ ] itchy eyes [ ] nasal discharge [ ] hives [ ] angioedema  [ ] All other systems negative  [ X] Unable to assess ROS because sedated    OBJECTIVE:  ICU Vital Signs Last 24 Hrs  T(C): 36.9 (11 Mar 2018 13:47), Max: 38.3 (10 Mar 2018 20:47)  T(F): 98.5 (11 Mar 2018 13:47), Max: 100.9 (10 Mar 2018 20:47)  HR: 72 (11 Mar 2018 15:00) (72 - 107)  BP: 106/53 (11 Mar 2018 15:00) (75/40 - 189/151)  BP(mean): 76 (11 Mar 2018 15:00) (52 - 109)  ABP: --  ABP(mean): --  RR: 14 (11 Mar 2018 15:00) (14 - 22)  SpO2: 100% (11 Mar 2018 15:00) (93% - 100%)    Mode: AC/ CMV (Assist Control/ Continuous Mandatory Ventilation), RR (machine): 14, TV (machine): 450, FiO2: 100, PEEP: 5, ITime: 1, MAP: 12, PIP: 35    03-10 @ 06:01 - 03-11 @ 07:00  --------------------------------------------------------  IN: 1160 mL / OUT: 0 mL / NET: 1160 mL    03-11 @ 07:01 - 03-11 @ 15:03  --------------------------------------------------------  IN: 294.4 mL / OUT: 0 mL / NET: 294.4 mL      CAPILLARY BLOOD GLUCOSE      POCT Blood Glucose.: 177 mg/dL (11 Mar 2018 13:23)      PHYSICAL EXAM:  General: intubated/sedated  HEENT: PERRL  Neck: supple, no JVD  Respiratory:   Cardiovascular: S1/S2, no murmurs/ rubs, no   Abdomen:   Extremities:   Skin:   Neurological:  Psychiatry:    LINES:    HOSPITAL MEDICATIONS:  Standing Meds:  ALBUTerol/ipratropium for Nebulization 3 milliLiter(s) Nebulizer every 8 hours  aspirin  chewable 81 milliGRAM(s) Oral daily  calamine Lotion 1 Application(s) Topical daily  ceFAZolin   IVPB 2000 milliGRAM(s) IV Intermittent <User Schedule>  dextrose 5%. 1000 milliLiter(s) IV Continuous <Continuous>  dextrose 5%. 1000 milliLiter(s) IV Continuous <Continuous>  dextrose 5%. 1000 milliLiter(s) IV Continuous <Continuous>  dextrose 50% Injectable 12.5 Gram(s) IV Push once  dextrose 50% Injectable 25 Gram(s) IV Push once  dextrose 50% Injectable 25 Gram(s) IV Push once  epoetin odalis Injectable 33464 Unit(s) IV Push <User Schedule>  heparin  Injectable 5000 Unit(s) SubCutaneous every 8 hours  insulin lispro (HumaLOG) corrective regimen sliding scale   SubCutaneous every 6 hours  insulin NPH human recombinant 18 Unit(s) SubCutaneous <User Schedule>  insulin NPH human recombinant 15 Unit(s) SubCutaneous <User Schedule>  norepinephrine Infusion 0.01 MICROgram(s)/kG/Min IV Continuous <Continuous>  nystatin Powder 1 Application(s) Topical two times a day  pantoprazole  Injectable 40 milliGRAM(s) IV Push daily  propofol Infusion 20 MICROgram(s)/kG/Min IV Continuous <Continuous>  sodium chloride 3%  Inhalation 3 milliLiter(s) Inhalation three times a day      PRN Meds:  acetaminophen    Suspension. 650 milliGRAM(s) Oral every 6 hours PRN  chlorhexidine 0.12% Liquid 15 milliLiter(s) Swish and Spit every 4 hours PRN  dextrose Gel 1 Dose(s) Oral once PRN  glucagon  Injectable 1 milliGRAM(s) IntraMuscular once PRN  hydrocortisone 1% Ointment 1 Application(s) Topical every 8 hours PRN  ondansetron Injectable 4 milliGRAM(s) IV Push every 6 hours PRN      LABS:                        7.4    5.4   )-----------( 303      ( 11 Mar 2018 14:53 )             23.4     Hgb Trend: 7.4<--, 8.2<--, 7.7<--, 7.4<--, 7.2<--  03-11    142  |  101  |  61<H>  ----------------------------<  143<H>  4.1   |  27  |  3.17<H>    Ca    9.4      11 Mar 2018 11:21  Phos  4.1     03-11  Mg     2.7     03-11    TPro  7.3  /  Alb  2.5<L>  /  TBili  0.2  /  DBili  x   /  AST  29  /  ALT  <5<L>  /  AlkPhos  294<H>  03-11    Creatinine Trend: 3.17<--, 2.58<--, 5.34<--, 4.97<--, 4.46<--, 4.81<--  PT/INR - ( 11 Mar 2018 11:21 )   PT: 10.7 sec;   INR: 0.99 ratio         PTT - ( 11 Mar 2018 11:21 )  PTT:35.7 sec    Arterial Blood Gas:  03-11 @ 14:45  7.30/60/106/29/99/2.5  ABG lactate: --        MICROBIOLOGY:     RADIOLOGY:  [ ] Reviewed and interpreted by me    EKG: CHIEF COMPLAINT: Patient is a 68y old  Female who presents with a chief complaint of Fever, total body weakness (02 Feb 2018 13:44)    67F PMH HTN, DMT2 (70/30 TDD: 90 on admission), Breast Cancer (diagnosed in Feb 2017) currently on Herceptin (last dose Jan 20th), originally presented 1/30 with Fever found to have influenza subsequently went into PEA arrest x 2 with ROSC, complicated by bilateral pneumothoraces s/p 3 chest tubes and paracentesis decompression of the abdomen, then found to have PE s/p tPA 1/30, also s/p new CVA, and also new ESRD requiring almost daily HD.     First MICU admission:  Patient was admitted to MICU for further management. Patient was found to have acute renal failure and shock liver. Nephrology was consulted and patient was started on dialysis. Patient was started on Tamiflu for flu and zosyn for possible aspiration pneumonia. Patients chest tubes and peritoneal drain were removed. Patient redeveloped a pneumothorax on the L side and chest tube was reinserted. 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 Urine legionella was negative and azithromycin was discontinued. Patient continued to be anuric and continued to receive dialysis. Shock liver resolved. Patient was extubated on 2/8/17. Patient completed a 5 day course of tamiflu and 7 day course of zosyn and did not show signs of infection. Patients pneumothorax on L side resolved and chest tube was removed. Patients mental status improved and was alert and oriented x1-2 and spontaneously moves all extremities. Patient had an NG tube placed pending an official speech and swallow eval. Patient failed speech and swallow an a repeat speech and swallow was planned. Patient was then transferred to the floors for further management.       Second MICU admission on 2/21.   RRT initially called this AM for worsening tachypnea. Pt was evaluated at bedside breathing around 30s with belly breathing, hypoxic to 85% on 40% FIO2, increased to 100% with improvement in oxygen saturation to 97%. Cxray read as worsening pulm edema. ABG showed hypercapnia. Renal called for urgent HD. Also concern for aspiration PNA as increasing density of RLL.     During MICU admission patient treated with 5 days of vanc and zosyn, found to have MSSA bacteremia and MSSA in the sputum, now transitioned to cefazolin until 3/22 (post dialysis). Bacteremia has cleared since 2/22. Patient was extubated on 3/1 to nasal cannula, and is saturating well. She has marked amounts of secretions, being suctioned now q3-4 hours, and assisted with metaneb, acapella and incentive spirometer.     Third MICU admission:  RRT called for hypoxia and tachypnea. Patient placed on NC, escalated to NRB with O2 sat 80%.Patient extremely lethargic and in significant respiratory distress. MICU fellow contacted for urgent intubation and patient ambu-bag'ed for O2 support while awaiting their arrival. Patient subsequently intubated by MICU fellow and transported to MICU in stable condition. Hypoxia is likely related to aspiration event.    REVIEW OF SYSTEMS:  Constitutional: [ ] negative [ ] fevers [ ] chills [ ] weight loss [ ] weight gain  HEENT: [ ] negative [ ] dry eyes [ ] eye irritation [ ] postnasal drip [ ] nasal congestion  CV: [ ] negative  [ ] chest pain [ ] orthopnea [ ] palpitations [ ] murmur  Resp: [ ] negative [ ] cough [ ] shortness of breath [ ] dyspnea [ ] wheezing [ ] sputum [ ] hemoptysis  GI: [ ] negative [ ] nausea [ ] vomiting [ ] diarrhea [ ] constipation [ ] abd pain [ ] dysphagia   : [ ] negative [ ] dysuria [ ] nocturia [ ] hematuria [ ] increased urinary frequency  Musculoskeletal: [ ] negative [ ] back pain [ ] myalgias [ ] arthralgias [ ] fracture  Skin: [ ] negative [ ] rash [ ] itch  Neurological: [ ] negative [ ] headache [ ] dizziness [ ] syncope [ ] weakness [ ] numbness  Psychiatric: [ ] negative [ ] anxiety [ ] depression  Endocrine: [ ] negative [ ] diabetes [ ] thyroid problem  Hematologic/Lymphatic: [ ] negative [ ] anemia [ ] bleeding problem  Allergic/Immunologic: [ ] negative [ ] itchy eyes [ ] nasal discharge [ ] hives [ ] angioedema  [ ] All other systems negative  [ X] Unable to assess ROS because sedated    OBJECTIVE:  ICU Vital Signs Last 24 Hrs  T(C): 36.9 (11 Mar 2018 13:47), Max: 38.3 (10 Mar 2018 20:47)  T(F): 98.5 (11 Mar 2018 13:47), Max: 100.9 (10 Mar 2018 20:47)  HR: 72 (11 Mar 2018 15:00) (72 - 107)  BP: 106/53 (11 Mar 2018 15:00) (75/40 - 189/151)  BP(mean): 76 (11 Mar 2018 15:00) (52 - 109)  ABP: --  ABP(mean): --  RR: 14 (11 Mar 2018 15:00) (14 - 22)  SpO2: 100% (11 Mar 2018 15:00) (93% - 100%)    Mode: AC/ CMV (Assist Control/ Continuous Mandatory Ventilation), RR (machine): 14, TV (machine): 450, FiO2: 100, PEEP: 5, ITime: 1, MAP: 12, PIP: 35    03-10 @ 06:01 - 03-11 @ 07:00  --------------------------------------------------------  IN: 1160 mL / OUT: 0 mL / NET: 1160 mL    03-11 @ 07:01 - 03-11 @ 15:03  --------------------------------------------------------  IN: 294.4 mL / OUT: 0 mL / NET: 294.4 mL      CAPILLARY BLOOD GLUCOSE      POCT Blood Glucose.: 177 mg/dL (11 Mar 2018 13:23)      PHYSICAL EXAM:  General: intubated/sedated, spontaneously moving upper extremities to physical stimuli  HEENT: PERRL  Neck: supple, no JVD  Respiratory: largely clear to auscultation   Cardiovascular: S1/S2, no murmurs/ rubs, no   Abdomen: soft, nontender, nondistended, +BS  Extremities: no pitting edema  Skin: mild erythema in the upper extremities  Neurological: sedated  Psychiatry: cannot assess at this time    LINES:    HOSPITAL MEDICATIONS:  Standing Meds:  ALBUTerol/ipratropium for Nebulization 3 milliLiter(s) Nebulizer every 8 hours  aspirin  chewable 81 milliGRAM(s) Oral daily  calamine Lotion 1 Application(s) Topical daily  ceFAZolin   IVPB 2000 milliGRAM(s) IV Intermittent <User Schedule>  dextrose 5%. 1000 milliLiter(s) IV Continuous <Continuous>  dextrose 5%. 1000 milliLiter(s) IV Continuous <Continuous>  dextrose 5%. 1000 milliLiter(s) IV Continuous <Continuous>  dextrose 50% Injectable 12.5 Gram(s) IV Push once  dextrose 50% Injectable 25 Gram(s) IV Push once  dextrose 50% Injectable 25 Gram(s) IV Push once  epoetin odalis Injectable 02149 Unit(s) IV Push <User Schedule>  heparin  Injectable 5000 Unit(s) SubCutaneous every 8 hours  insulin lispro (HumaLOG) corrective regimen sliding scale   SubCutaneous every 6 hours  insulin NPH human recombinant 18 Unit(s) SubCutaneous <User Schedule>  insulin NPH human recombinant 15 Unit(s) SubCutaneous <User Schedule>  norepinephrine Infusion 0.01 MICROgram(s)/kG/Min IV Continuous <Continuous>  nystatin Powder 1 Application(s) Topical two times a day  pantoprazole  Injectable 40 milliGRAM(s) IV Push daily  propofol Infusion 20 MICROgram(s)/kG/Min IV Continuous <Continuous>  sodium chloride 3%  Inhalation 3 milliLiter(s) Inhalation three times a day      PRN Meds:  acetaminophen    Suspension. 650 milliGRAM(s) Oral every 6 hours PRN  chlorhexidine 0.12% Liquid 15 milliLiter(s) Swish and Spit every 4 hours PRN  dextrose Gel 1 Dose(s) Oral once PRN  glucagon  Injectable 1 milliGRAM(s) IntraMuscular once PRN  hydrocortisone 1% Ointment 1 Application(s) Topical every 8 hours PRN  ondansetron Injectable 4 milliGRAM(s) IV Push every 6 hours PRN      LABS:                        7.4    5.4   )-----------( 303      ( 11 Mar 2018 14:53 )             23.4     Hgb Trend: 7.4<--, 8.2<--, 7.7<--, 7.4<--, 7.2<--  03-11    142  |  101  |  61<H>  ----------------------------<  143<H>  4.1   |  27  |  3.17<H>    Ca    9.4      11 Mar 2018 11:21  Phos  4.1     03-11  Mg     2.7     03-11    TPro  7.3  /  Alb  2.5<L>  /  TBili  0.2  /  DBili  x   /  AST  29  /  ALT  <5<L>  /  AlkPhos  294<H>  03-11    Creatinine Trend: 3.17<--, 2.58<--, 5.34<--, 4.97<--, 4.46<--, 4.81<--  PT/INR - ( 11 Mar 2018 11:21 )   PT: 10.7 sec;   INR: 0.99 ratio         PTT - ( 11 Mar 2018 11:21 )  PTT:35.7 sec    Arterial Blood Gas:  03-11 @ 14:45  7.30/60/106/29/99/2.5  ABG lactate: --        MICROBIOLOGY:     RADIOLOGY:  [ ] Reviewed and interpreted by me    EKG:  Assessment/Plan:  68F PMH HTN, DMT2, Breast Cancer (diagnosed in Feb 2017) currently on trastuzumab (last dose Jan 20th), originally presented 1/30 with fever found to have influenza subsequently went into PEA arrest x 2 with ROSC, complicated by bilateral pneumothoraces with pneumomediastinum s/p 3 chest tubes, and new ESRD, readmitted to the MICU for hypoxic respiratory failure likely 2/2 aspiration pneumonia with MSSA PNA and bacteremia on 2/21, extubated on 3/1 with stable respiratory status, now readmitted to the MICU for hypoxic respiratory failure likely secondary to a recurrent aspiration event.       # Neuro:  - currently patient intubated and sedated  - Known watershed ischemia of the occiput from low flow state during admission 2/2 flu    # CV:  - currently off pressors  - MAPs > 65  - can start levo if needed while patient on propofol  - c/w carvedilol 6.25 q 12 for now if blood pressure remains normotesnive      # Pulm:  - reintubated for hypoxic respiratory failure (third intubation)  - may need eventual tracheostomy   - if patient with marked secretions- will start 3% saline with duonebs  - no signs or symptoms of PNA at this time       # Renal:  - on HD 3x/ week- T,Th,S  - s/p permacath placement by IR    # ID:   -concern for reccurent aspiration event  - if patient dale white count or fever- will start zosyn  - recent MSSA bacteremia with clearance of blood cultures- c/w ancef until March 24th (- ancef scheduling per dialysis schedule (i.e. If schedule T-T-Sa (2-2-3 grams) )  - echo after negative blood cultures shows no signs of vegetation    # GI:  - NPO for now as patient likely aspirated on tube feeds  - GI ppx with protonix 40mg daily    # Endocrine:  - hold NPH when tube feeds on hold  - c/w sliding scale insulin    # Heme:  - Will continue to trend H/H  - Currently holding Herceptin    # Skin:  - mild erythema      # DVT ppx:  - HSC 5000 units Q8 hours CHIEF COMPLAINT: Patient is a 68y old  Female who presents with a chief complaint of Fever, total body weakness (02 Feb 2018 13:44)    67F PMH HTN, DMT2 (70/30 TDD: 90 on admission), Breast Cancer (diagnosed in Feb 2017) currently on Herceptin (last dose Jan 20th), originally presented 1/30 with Fever found to have influenza subsequently went into PEA arrest x 2 with ROSC, complicated by bilateral pneumothoraces s/p 3 chest tubes and paracentesis decompression of the abdomen, then found to have PE s/p tPA 1/30, also s/p new CVA, and also new ESRD requiring almost daily HD.     First MICU admission:  Patient was admitted to MICU for further management. Patient was found to have acute renal failure and shock liver. Nephrology was consulted and patient was started on dialysis. Patient was started on Tamiflu for flu and zosyn for possible aspiration pneumonia. Patients chest tubes and peritoneal drain were removed. Patient redeveloped a pneumothorax on the L side and chest tube was reinserted. 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 Urine legionella was negative and azithromycin was discontinued. Patient continued to be anuric and continued to receive dialysis. Shock liver resolved. Patient was extubated on 2/8/17. Patient completed a 5 day course of tamiflu and 7 day course of zosyn and did not show signs of infection. Patients pneumothorax on L side resolved and chest tube was removed. Patients mental status improved and was alert and oriented x1-2 and spontaneously moves all extremities. Patient had an NG tube placed pending an official speech and swallow eval. Patient failed speech and swallow an a repeat speech and swallow was planned. Patient was then transferred to the floors for further management.       Second MICU admission on 2/21.   RRT initially called this AM for worsening tachypnea. Pt was evaluated at bedside breathing around 30s with belly breathing, hypoxic to 85% on 40% FIO2, increased to 100% with improvement in oxygen saturation to 97%. Cxray read as worsening pulm edema. ABG showed hypercapnia. Renal called for urgent HD. Also concern for aspiration PNA as increasing density of RLL.     During MICU admission patient treated with 5 days of vanc and zosyn, found to have MSSA bacteremia and MSSA in the sputum, now transitioned to cefazolin until 3/22 (post dialysis). Bacteremia has cleared since 2/22. Patient was extubated on 3/1 to nasal cannula, and is saturating well. She has marked amounts of secretions, being suctioned now q3-4 hours, and assisted with metaneb, acapella and incentive spirometer.     Third MICU admission:  RRT called for hypoxia and tachypnea. Patient placed on NC, escalated to NRB with O2 sat 80%.Patient extremely lethargic and in significant respiratory distress. MICU fellow contacted for urgent intubation and patient ambu-bag'ed for O2 support while awaiting their arrival. Patient subsequently intubated by MICU fellow and transported to MICU in stable condition. Hypoxia is likely related to aspiration event.    REVIEW OF SYSTEMS:  Constitutional: [ ] negative [ ] fevers [ ] chills [ ] weight loss [ ] weight gain  HEENT: [ ] negative [ ] dry eyes [ ] eye irritation [ ] postnasal drip [ ] nasal congestion  CV: [ ] negative  [ ] chest pain [ ] orthopnea [ ] palpitations [ ] murmur  Resp: [ ] negative [ ] cough [ ] shortness of breath [ ] dyspnea [ ] wheezing [ ] sputum [ ] hemoptysis  GI: [ ] negative [ ] nausea [ ] vomiting [ ] diarrhea [ ] constipation [ ] abd pain [ ] dysphagia   : [ ] negative [ ] dysuria [ ] nocturia [ ] hematuria [ ] increased urinary frequency  Musculoskeletal: [ ] negative [ ] back pain [ ] myalgias [ ] arthralgias [ ] fracture  Skin: [ ] negative [ ] rash [ ] itch  Neurological: [ ] negative [ ] headache [ ] dizziness [ ] syncope [ ] weakness [ ] numbness  Psychiatric: [ ] negative [ ] anxiety [ ] depression  Endocrine: [ ] negative [ ] diabetes [ ] thyroid problem  Hematologic/Lymphatic: [ ] negative [ ] anemia [ ] bleeding problem  Allergic/Immunologic: [ ] negative [ ] itchy eyes [ ] nasal discharge [ ] hives [ ] angioedema  [ ] All other systems negative  [ X] Unable to assess ROS because sedated    OBJECTIVE:  ICU Vital Signs Last 24 Hrs  T(C): 36.9 (11 Mar 2018 13:47), Max: 38.3 (10 Mar 2018 20:47)  T(F): 98.5 (11 Mar 2018 13:47), Max: 100.9 (10 Mar 2018 20:47)  HR: 72 (11 Mar 2018 15:00) (72 - 107)  BP: 106/53 (11 Mar 2018 15:00) (75/40 - 189/151)  BP(mean): 76 (11 Mar 2018 15:00) (52 - 109)  ABP: --  ABP(mean): --  RR: 14 (11 Mar 2018 15:00) (14 - 22)  SpO2: 100% (11 Mar 2018 15:00) (93% - 100%)    Mode: AC/ CMV (Assist Control/ Continuous Mandatory Ventilation), RR (machine): 14, TV (machine): 450, FiO2: 100, PEEP: 5, ITime: 1, MAP: 12, PIP: 35    03-10 @ 06:01 - 03-11 @ 07:00  --------------------------------------------------------  IN: 1160 mL / OUT: 0 mL / NET: 1160 mL    03-11 @ 07:01 - 03-11 @ 15:03  --------------------------------------------------------  IN: 294.4 mL / OUT: 0 mL / NET: 294.4 mL      CAPILLARY BLOOD GLUCOSE      POCT Blood Glucose.: 177 mg/dL (11 Mar 2018 13:23)      PHYSICAL EXAM:  General: intubated/sedated, spontaneously moving upper extremities to physical stimuli  HEENT: PERRL  Neck: supple, no JVD  Respiratory: largely clear to auscultation   Cardiovascular: S1/S2, no murmurs/ rubs, no   Abdomen: soft, nontender, nondistended, +BS  Extremities: no pitting edema  Skin: mild erythema in the upper extremities  Neurological: sedated  Psychiatry: cannot assess at this time    LINES:    HOSPITAL MEDICATIONS:  Standing Meds:  ALBUTerol/ipratropium for Nebulization 3 milliLiter(s) Nebulizer every 8 hours  aspirin  chewable 81 milliGRAM(s) Oral daily  calamine Lotion 1 Application(s) Topical daily  ceFAZolin   IVPB 2000 milliGRAM(s) IV Intermittent <User Schedule>  dextrose 5%. 1000 milliLiter(s) IV Continuous <Continuous>  dextrose 5%. 1000 milliLiter(s) IV Continuous <Continuous>  dextrose 5%. 1000 milliLiter(s) IV Continuous <Continuous>  dextrose 50% Injectable 12.5 Gram(s) IV Push once  dextrose 50% Injectable 25 Gram(s) IV Push once  dextrose 50% Injectable 25 Gram(s) IV Push once  epoetin odalis Injectable 90119 Unit(s) IV Push <User Schedule>  heparin  Injectable 5000 Unit(s) SubCutaneous every 8 hours  insulin lispro (HumaLOG) corrective regimen sliding scale   SubCutaneous every 6 hours  insulin NPH human recombinant 18 Unit(s) SubCutaneous <User Schedule>  insulin NPH human recombinant 15 Unit(s) SubCutaneous <User Schedule>  norepinephrine Infusion 0.01 MICROgram(s)/kG/Min IV Continuous <Continuous>  nystatin Powder 1 Application(s) Topical two times a day  pantoprazole  Injectable 40 milliGRAM(s) IV Push daily  propofol Infusion 20 MICROgram(s)/kG/Min IV Continuous <Continuous>  sodium chloride 3%  Inhalation 3 milliLiter(s) Inhalation three times a day      PRN Meds:  acetaminophen    Suspension. 650 milliGRAM(s) Oral every 6 hours PRN  chlorhexidine 0.12% Liquid 15 milliLiter(s) Swish and Spit every 4 hours PRN  dextrose Gel 1 Dose(s) Oral once PRN  glucagon  Injectable 1 milliGRAM(s) IntraMuscular once PRN  hydrocortisone 1% Ointment 1 Application(s) Topical every 8 hours PRN  ondansetron Injectable 4 milliGRAM(s) IV Push every 6 hours PRN      LABS:                        7.4    5.4   )-----------( 303      ( 11 Mar 2018 14:53 )             23.4     Hgb Trend: 7.4<--, 8.2<--, 7.7<--, 7.4<--, 7.2<--  03-11    142  |  101  |  61<H>  ----------------------------<  143<H>  4.1   |  27  |  3.17<H>    Ca    9.4      11 Mar 2018 11:21  Phos  4.1     03-11  Mg     2.7     03-11    TPro  7.3  /  Alb  2.5<L>  /  TBili  0.2  /  DBili  x   /  AST  29  /  ALT  <5<L>  /  AlkPhos  294<H>  03-11    Creatinine Trend: 3.17<--, 2.58<--, 5.34<--, 4.97<--, 4.46<--, 4.81<--  PT/INR - ( 11 Mar 2018 11:21 )   PT: 10.7 sec;   INR: 0.99 ratio         PTT - ( 11 Mar 2018 11:21 )  PTT:35.7 sec    Arterial Blood Gas:  03-11 @ 14:45  7.30/60/106/29/99/2.5  ABG lactate: --        MICROBIOLOGY:     RADIOLOGY:  [ ] Reviewed and interpreted by me    EKG:  Assessment/Plan:  68F PMH HTN, DMT2, Breast Cancer (diagnosed in Feb 2017) currently on trastuzumab (last dose Jan 20th), originally presented 1/30 with fever found to have influenza subsequently went into PEA arrest x 2 with ROSC, complicated by bilateral pneumothoraces with pneumomediastinum s/p 3 chest tubes, and new ESRD, readmitted to the MICU for hypoxic respiratory failure likely 2/2 aspiration pneumonia with MSSA PNA and bacteremia on 2/21, extubated on 3/1 with stable respiratory status, now readmitted to the MICU for hypoxic respiratory failure likely secondary to a recurrent aspiration event.       # Neuro:  - currently patient intubated and sedated  - Known watershed ischemia of the occiput from low flow state during admission 2/2 flu    # CV:  - currently off pressors  - MAPs > 65  - can start levo if needed while patient on propofol  - c/w carvedilol 6.25 q 12 for now if blood pressure remains normotesnive      # Pulm:  - reintubated for hypoxic respiratory failure (third intubation)  - may need eventual tracheostomy   - if patient with marked secretions- will start 3% saline with duonebs  - no signs or symptoms of PNA at this time       # Renal:  - on HD 3x/ week- T,Th,S  - s/p permacath placement by IR    # ID:   -concern for reccurent aspiration event  - if patient dale white count or fever- will start zosyn  - recent MSSA bacteremia with clearance of blood cultures- c/w ancef until March 24th (- ancef scheduling per dialysis schedule (i.e. If schedule T-T-Sa (2-2-3 grams) )  - echo after negative blood cultures shows no signs of vegetation    # GI:  - NPO for now as patient likely aspirated on tube feeds  - GI ppx with protonix 40mg daily    # Endocrine:  - hold NPH when tube feeds on hold  - c/w sliding scale insulin    # Heme:  - Will continue to trend H/H  - Currently holding Herceptin    # Skin:  - mild erythema      # DVT ppx:  - HSC 5000 units Q8 hours      CCM ATTENDING: I have examined pt and agree with above exam and plan.  Pt well known to me. Now with 3rd intubation in about 6 weeks. Pt likely with aspiration pneumonitis. Pt is severely deconditioned with oropharyngeal dysphagia.  Family has agreed for tracheostomy. Will arrange with ICU team.  Continue Ancef for MSSA bacteremia. If pt becomes febrile or develops abnormal secretions will change to Zosyn for 5-7 days. Pt now with ESRD on dialysis.  CC time 35 min not including dialysis.

## 2018-03-11 NOTE — PROGRESS NOTE ADULT - SUBJECTIVE AND OBJECTIVE BOX
Patient is a 68y old  Female who presents with a chief complaint of Fever, total body weakness (02 Feb 2018 13:44)      Interval Events:    REVIEW OF SYSTEMS:  [ ] Positive  [ ] All other systems negative  [ ] Unable to assess ROS because ________    Vital Signs Last 24 Hrs  T(C): 37.4 (03-11-18 @ 03:38), Max: 38.3 (03-10-18 @ 20:47)  T(F): 99.4 (03-11-18 @ 03:38), Max: 100.9 (03-10-18 @ 20:47)  HR: 99 (03-11-18 @ 03:38) (91 - 102)  BP: 168/71 (03-11-18 @ 03:38) (95/65 - 168/71)  RR: 19 (03-11-18 @ 03:38) (16 - 19)  SpO2: 97% (03-11-18 @ 03:38) (96% - 97%)    PHYSICAL EXAM:  HEENT:   [ ]Tracheostomy:  [ ]Pupils equal  [ ]No oral lesions  [ ]Abnormal    SKIN  [ ]No Rash  [ ] Abnormal  [ ] pressure    CARDIAC  [ ]Regular  [ ]Abnormal    PULMONARY  [ ]Bilateral Clear Breath Sounds  [ ]Normal Excursion  [ ]Abnormal    GI  [ ]PEG      [ ] +BS		              [ ]Soft, nondistended, nontender	  [ ]Abnormal    MUSCULOSKELETAL                                   [ ]Bedbound                 [ ]Abnormal    [ ]Ambulatory/OOB to chair                           EXTREMITIES                                         [ ]Normal  [ ]Edema                           NEUROLOGIC  [ ] Normal, non focal  [ ] Focal findings:    PSYCHIATRIC  [ ]Alert and appropriate  [ ] Sedated	 [ ]Agitated    :  Wheeler: [ ] YES, if yes: Date of Placement                        [  ] NO      LINES: TLC [ ]YES, if yes: Date of Placement                    [ ] No    HOSPITAL MEDICATIONS:  MEDICATIONS  (STANDING):  ALBUTerol/ipratropium for Nebulization 3 milliLiter(s) Nebulizer every 8 hours  aspirin  chewable 81 milliGRAM(s) Oral daily  calamine Lotion 1 Application(s) Topical daily  carvedilol 6.25 milliGRAM(s) Oral every 12 hours  ceFAZolin   IVPB 2000 milliGRAM(s) IV Intermittent <User Schedule>  dextrose 5%. 1000 milliLiter(s) (50 mL/Hr) IV Continuous <Continuous>  dextrose 5%. 1000 milliLiter(s) (50 mL/Hr) IV Continuous <Continuous>  dextrose 5%. 1000 milliLiter(s) (50 mL/Hr) IV Continuous <Continuous>  dextrose 50% Injectable 12.5 Gram(s) IV Push once  dextrose 50% Injectable 25 Gram(s) IV Push once  dextrose 50% Injectable 25 Gram(s) IV Push once  epoetin odalis Injectable 15299 Unit(s) IV Push <User Schedule>  heparin  Injectable 5000 Unit(s) SubCutaneous every 8 hours  insulin lispro (HumaLOG) corrective regimen sliding scale   SubCutaneous every 6 hours  insulin NPH human recombinant 18 Unit(s) SubCutaneous <User Schedule>  insulin NPH human recombinant 15 Unit(s) SubCutaneous <User Schedule>  nystatin Powder 1 Application(s) Topical two times a day  ondansetron Injectable 4 milliGRAM(s) IV Push once  pantoprazole  Injectable 40 milliGRAM(s) IV Push daily  sodium chloride 3%  Inhalation 3 milliLiter(s) Inhalation three times a day    MEDICATIONS  (PRN):  acetaminophen    Suspension. 650 milliGRAM(s) Oral every 6 hours PRN Mild Pain (1 - 3)  chlorhexidine 0.12% Liquid 15 milliLiter(s) Swish and Spit every 4 hours PRN mouth hygiene  dextrose Gel 1 Dose(s) Oral once PRN Blood Glucose LESS THAN 70 milliGRAM(s)/deciliter  glucagon  Injectable 1 milliGRAM(s) IntraMuscular once PRN Glucose LESS THAN 70 milligrams/deciliter  hydrocortisone 1% Ointment 1 Application(s) Topical every 8 hours PRN Rash and/or Itching  midodrine 10 milliGRAM(s) Oral every other day PRN prior to dialysis  ondansetron Injectable 4 milliGRAM(s) IV Push every 6 hours PRN Nausea and/or Vomiting      LABS:                        8.2    5.9   )-----------( 327      ( 11 Mar 2018 11:21 )             25.5     03-11    142  |  101  |  61<H>  ----------------------------<  143<H>  4.1   |  27  |  3.17<H>    Ca    9.4      11 Mar 2018 11:21  Phos  4.1     03-11  Mg     2.7     03-11    TPro  7.3  /  Alb  2.5<L>  /  TBili  0.2  /  DBili  x   /  AST  29  /  ALT  <5<L>  /  AlkPhos  294<H>  03-11    PT/INR - ( 11 Mar 2018 11:21 )   PT: 10.7 sec;   INR: 0.99 ratio         PTT - ( 11 Mar 2018 11:21 )  PTT:35.7 sec        CAPILLARY BLOOD GLUCOSE    MICROBIOLOGY:     RADIOLOGY:  [ ] Reviewed and interpreted by me Patient is a 68y old  Female who presents with a chief complaint of Fever, total body weakness (02 Feb 2018 13:44)      Interval Events: none     REVIEW OF SYSTEMS:  [ ] Positive  [ ] All other systems negative  [x ] Unable to assess ROS because pt asleep   Vital Signs Last 24 Hrs  T(C): 37.4 (03-11-18 @ 03:38), Max: 38.3 (03-10-18 @ 20:47)  T(F): 99.4 (03-11-18 @ 03:38), Max: 100.9 (03-10-18 @ 20:47)  HR: 99 (03-11-18 @ 03:38) (91 - 102)  BP: 168/71 (03-11-18 @ 03:38) (95/65 - 168/71)  RR: 19 (03-11-18 @ 03:38) (16 - 19)  SpO2: 97% (03-11-18 @ 03:38) (96% - 97%)    PHYSICAL EXAM:  HEENT:   [ ]Tracheostomy:  [ ]Pupils equal  [ ]No oral lesions  [ ]Abnormal    SKIN  [ ]No Rash  [x ] Abnormal redness L elbow  [ ] pressure    CARDIAC  [x ]Regular  [ ]Abnormal    PULMONARY  [x ]Bilateral Clear Breath Sounds  [ ]Normal Excursion  [ ]Abnormal    GI  [x]NGT      [x ] +BS		              [x ]Soft, nondistended, nontender	  [ ]Abnormal    MUSCULOSKELETAL                                   [ ]Bedbound                 [ ]Abnormal    [x ]OOB to chair with assist                           EXTREMITIES                                         [ ]Normal  [ ]Edema                           NEUROLOGIC  [ ] Normal, non focal  [ ] Focal findings:  pt asleep   PSYCHIATRIC  [ ]Alert and appropriate  [ ] Sedated	 [ ]Agitated    :  Wheeler: [ ] YES, if yes: Date of Placement                        [ x ] NO      LINES:  RSC permacath  [ x]YES, if yes: Date of Placement 3/, L SC mediport  5/27/17'                    [ ] No    HOSPITAL MEDICATIONS:  MEDICATIONS  (STANDING):  ALBUTerol/ipratropium for Nebulization 3 milliLiter(s) Nebulizer every 8 hours  aspirin  chewable 81 milliGRAM(s) Oral daily  calamine Lotion 1 Application(s) Topical daily  carvedilol 6.25 milliGRAM(s) Oral every 12 hours  ceFAZolin   IVPB 2000 milliGRAM(s) IV Intermittent <User Schedule>  dextrose 5%. 1000 milliLiter(s) (50 mL/Hr) IV Continuous <Continuous>  dextrose 5%. 1000 milliLiter(s) (50 mL/Hr) IV Continuous <Continuous>  dextrose 5%. 1000 milliLiter(s) (50 mL/Hr) IV Continuous <Continuous>  dextrose 50% Injectable 12.5 Gram(s) IV Push once  dextrose 50% Injectable 25 Gram(s) IV Push once  dextrose 50% Injectable 25 Gram(s) IV Push once  epoetin odalis Injectable 10086 Unit(s) IV Push <User Schedule>  heparin  Injectable 5000 Unit(s) SubCutaneous every 8 hours  insulin lispro (HumaLOG) corrective regimen sliding scale   SubCutaneous every 6 hours  insulin NPH human recombinant 18 Unit(s) SubCutaneous <User Schedule>  insulin NPH human recombinant 15 Unit(s) SubCutaneous <User Schedule>  nystatin Powder 1 Application(s) Topical two times a day  ondansetron Injectable 4 milliGRAM(s) IV Push once  pantoprazole  Injectable 40 milliGRAM(s) IV Push daily  sodium chloride 3%  Inhalation 3 milliLiter(s) Inhalation three times a day    MEDICATIONS  (PRN):  acetaminophen    Suspension. 650 milliGRAM(s) Oral every 6 hours PRN Mild Pain (1 - 3)  chlorhexidine 0.12% Liquid 15 milliLiter(s) Swish and Spit every 4 hours PRN mouth hygiene  dextrose Gel 1 Dose(s) Oral once PRN Blood Glucose LESS THAN 70 milliGRAM(s)/deciliter  glucagon  Injectable 1 milliGRAM(s) IntraMuscular once PRN Glucose LESS THAN 70 milligrams/deciliter  hydrocortisone 1% Ointment 1 Application(s) Topical every 8 hours PRN Rash and/or Itching  midodrine 10 milliGRAM(s) Oral every other day PRN prior to dialysis  ondansetron Injectable 4 milliGRAM(s) IV Push every 6 hours PRN Nausea and/or Vomiting      LABS:                        8.2    5.9   )-----------( 327      ( 11 Mar 2018 11:21 )             25.5     03-11    142  |  101  |  61<H>  ----------------------------<  143<H>  4.1   |  27  |  3.17<H>    Ca    9.4      11 Mar 2018 11:21  Phos  4.1     03-11  Mg     2.7     03-11    TPro  7.3  /  Alb  2.5<L>  /  TBili  0.2  /  DBili  x   /  AST  29  /  ALT  <5<L>  /  AlkPhos  294<H>  03-11    PT/INR - ( 11 Mar 2018 11:21 )   PT: 10.7 sec;   INR: 0.99 ratio         PTT - ( 11 Mar 2018 11:21 )  PTT:35.7 sec        CAPILLARY BLOOD GLUCOSE    MICROBIOLOGY:     RADIOLOGY:  [ ] Reviewed and interpreted by me Patient is a 68y old  Female who presents with a chief complaint of Fever, total body weakness (02 Feb 2018 13:44)      Interval Events: none     REVIEW OF SYSTEMS:  [ ] Positive  [ ] All other systems negative  [x ] Unable to assess ROS because pt asleep   Vital Signs Last 24 Hrs  T(C): 37.4 (03-11-18 @ 03:38), Max: 38.3 (03-10-18 @ 20:47)  T(F): 99.4 (03-11-18 @ 03:38), Max: 100.9 (03-10-18 @ 20:47)  HR: 99 (03-11-18 @ 03:38) (91 - 102)  BP: 168/71 (03-11-18 @ 03:38) (95/65 - 168/71)  RR: 19 (03-11-18 @ 03:38) (16 - 19)  SpO2: 97% (03-11-18 @ 03:38) (96% - 97%)    PHYSICAL EXAM:  HEENT:   [ ]Tracheostomy:  [ ]Pupils equal  [ ]No oral lesions  [ ]Abnormal    SKIN  [ ]No Rash  [x ] Abnormal redness L elbow  [ ] pressure    CARDIAC  [x ]Regular  [ ]Abnormal    PULMONARY  [x ]Bilateral Clear Breath Sounds  [ ]Normal Excursion  [ ]Abnormal    GI  [x]NGT      [x ] +BS		              [x ]Soft, nondistended, nontender	  [ ]Abnormal    MUSCULOSKELETAL                                   [ ]Bedbound                 [ ]Abnormal    [x ]OOB to chair with assist                           EXTREMITIES                                         [ ]Normal  [ ]Edema                           NEUROLOGIC  [ ] Normal, non focal  [ ] Focal findings:  pt asleep   PSYCHIATRIC  [ ]Alert and appropriate  [ ] Sedated	 [ ]Agitated    :  Wheeler: [ ] YES, if yes: Date of Placement                        [ x ] NO      LINES:  R SC permacath  [ x]YES, if yes: Date of Placement 3/8,  L SC mediport  5/27/17'                    [ ] No    HOSPITAL MEDICATIONS:  MEDICATIONS  (STANDING):  ALBUTerol/ipratropium for Nebulization 3 milliLiter(s) Nebulizer every 8 hours  aspirin  chewable 81 milliGRAM(s) Oral daily  calamine Lotion 1 Application(s) Topical daily  carvedilol 6.25 milliGRAM(s) Oral every 12 hours  ceFAZolin   IVPB 2000 milliGRAM(s) IV Intermittent <User Schedule>  dextrose 5%. 1000 milliLiter(s) (50 mL/Hr) IV Continuous <Continuous>  dextrose 5%. 1000 milliLiter(s) (50 mL/Hr) IV Continuous <Continuous>  dextrose 5%. 1000 milliLiter(s) (50 mL/Hr) IV Continuous <Continuous>  dextrose 50% Injectable 12.5 Gram(s) IV Push once  dextrose 50% Injectable 25 Gram(s) IV Push once  dextrose 50% Injectable 25 Gram(s) IV Push once  epoetin odalis Injectable 86137 Unit(s) IV Push <User Schedule>  heparin  Injectable 5000 Unit(s) SubCutaneous every 8 hours  insulin lispro (HumaLOG) corrective regimen sliding scale   SubCutaneous every 6 hours  insulin NPH human recombinant 18 Unit(s) SubCutaneous <User Schedule>  insulin NPH human recombinant 15 Unit(s) SubCutaneous <User Schedule>  nystatin Powder 1 Application(s) Topical two times a day  ondansetron Injectable 4 milliGRAM(s) IV Push once  pantoprazole  Injectable 40 milliGRAM(s) IV Push daily  sodium chloride 3%  Inhalation 3 milliLiter(s) Inhalation three times a day    MEDICATIONS  (PRN):  acetaminophen    Suspension. 650 milliGRAM(s) Oral every 6 hours PRN Mild Pain (1 - 3)  chlorhexidine 0.12% Liquid 15 milliLiter(s) Swish and Spit every 4 hours PRN mouth hygiene  dextrose Gel 1 Dose(s) Oral once PRN Blood Glucose LESS THAN 70 milliGRAM(s)/deciliter  glucagon  Injectable 1 milliGRAM(s) IntraMuscular once PRN Glucose LESS THAN 70 milligrams/deciliter  hydrocortisone 1% Ointment 1 Application(s) Topical every 8 hours PRN Rash and/or Itching  midodrine 10 milliGRAM(s) Oral every other day PRN prior to dialysis  ondansetron Injectable 4 milliGRAM(s) IV Push every 6 hours PRN Nausea and/or Vomiting      LABS:                        8.2    5.9   )-----------( 327      ( 11 Mar 2018 11:21 )             25.5     03-11    142  |  101  |  61<H>  ----------------------------<  143<H>  4.1   |  27  |  3.17<H>    Ca    9.4      11 Mar 2018 11:21  Phos  4.1     03-11  Mg     2.7     03-11    TPro  7.3  /  Alb  2.5<L>  /  TBili  0.2  /  DBili  x   /  AST  29  /  ALT  <5<L>  /  AlkPhos  294<H>  03-11    PT/INR - ( 11 Mar 2018 11:21 )   PT: 10.7 sec;   INR: 0.99 ratio         PTT - ( 11 Mar 2018 11:21 )  PTT:35.7 sec        CAPILLARY BLOOD GLUCOSE    MICROBIOLOGY:     RADIOLOGY:  [ ] Reviewed and interpreted by me

## 2018-03-12 LAB
ALBUMIN SERPL ELPH-MCNC: 2.1 G/DL — LOW (ref 3.3–5)
ALBUMIN SERPL ELPH-MCNC: 2.5 G/DL — LOW (ref 3.3–5)
ALP SERPL-CCNC: 226 U/L — HIGH (ref 40–120)
ALP SERPL-CCNC: 235 U/L — HIGH (ref 40–120)
ALT FLD-CCNC: <5 U/L RC — LOW (ref 10–45)
ALT FLD-CCNC: <5 U/L RC — LOW (ref 10–45)
ANION GAP SERPL CALC-SCNC: 14 MMOL/L — SIGNIFICANT CHANGE UP (ref 5–17)
ANION GAP SERPL CALC-SCNC: 15 MMOL/L — SIGNIFICANT CHANGE UP (ref 5–17)
APTT BLD: 30.7 SEC — SIGNIFICANT CHANGE UP (ref 27.5–37.4)
AST SERPL-CCNC: 21 U/L — SIGNIFICANT CHANGE UP (ref 10–40)
AST SERPL-CCNC: 26 U/L — SIGNIFICANT CHANGE UP (ref 10–40)
BILIRUB SERPL-MCNC: 0.2 MG/DL — SIGNIFICANT CHANGE UP (ref 0.2–1.2)
BILIRUB SERPL-MCNC: 0.3 MG/DL — SIGNIFICANT CHANGE UP (ref 0.2–1.2)
BLD GP AB SCN SERPL QL: NEGATIVE — SIGNIFICANT CHANGE UP
BUN SERPL-MCNC: 20 MG/DL — SIGNIFICANT CHANGE UP (ref 7–23)
BUN SERPL-MCNC: 68 MG/DL — HIGH (ref 7–23)
CALCIUM SERPL-MCNC: 8.3 MG/DL — LOW (ref 8.4–10.5)
CALCIUM SERPL-MCNC: 9.1 MG/DL — SIGNIFICANT CHANGE UP (ref 8.4–10.5)
CHLORIDE SERPL-SCNC: 102 MMOL/L — SIGNIFICANT CHANGE UP (ref 96–108)
CHLORIDE SERPL-SCNC: 96 MMOL/L — SIGNIFICANT CHANGE UP (ref 96–108)
CO2 SERPL-SCNC: 25 MMOL/L — SIGNIFICANT CHANGE UP (ref 22–31)
CO2 SERPL-SCNC: 26 MMOL/L — SIGNIFICANT CHANGE UP (ref 22–31)
CREAT SERPL-MCNC: 1.4 MG/DL — HIGH (ref 0.5–1.3)
CREAT SERPL-MCNC: 3.47 MG/DL — HIGH (ref 0.5–1.3)
GLUCOSE BLDC GLUCOMTR-MCNC: 113 MG/DL — HIGH (ref 70–99)
GLUCOSE BLDC GLUCOMTR-MCNC: 142 MG/DL — HIGH (ref 70–99)
GLUCOSE BLDC GLUCOMTR-MCNC: 150 MG/DL — HIGH (ref 70–99)
GLUCOSE SERPL-MCNC: 111 MG/DL — HIGH (ref 70–99)
GLUCOSE SERPL-MCNC: 141 MG/DL — HIGH (ref 70–99)
HCT VFR BLD CALC: 21.2 % — LOW (ref 34.5–45)
HCT VFR BLD CALC: 23 % — LOW (ref 34.5–45)
HGB BLD-MCNC: 7 G/DL — CRITICAL LOW (ref 11.5–15.5)
HGB BLD-MCNC: 7.6 G/DL — LOW (ref 11.5–15.5)
INR BLD: 1.05 RATIO — SIGNIFICANT CHANGE UP (ref 0.88–1.16)
MAGNESIUM SERPL-MCNC: 1.9 MG/DL — SIGNIFICANT CHANGE UP (ref 1.6–2.6)
MAGNESIUM SERPL-MCNC: 2.6 MG/DL — SIGNIFICANT CHANGE UP (ref 1.6–2.6)
MCHC RBC-ENTMCNC: 32.3 PG — SIGNIFICANT CHANGE UP (ref 27–34)
MCHC RBC-ENTMCNC: 32.3 PG — SIGNIFICANT CHANGE UP (ref 27–34)
MCHC RBC-ENTMCNC: 32.8 GM/DL — SIGNIFICANT CHANGE UP (ref 32–36)
MCHC RBC-ENTMCNC: 33.1 GM/DL — SIGNIFICANT CHANGE UP (ref 32–36)
MCV RBC AUTO: 97.6 FL — SIGNIFICANT CHANGE UP (ref 80–100)
MCV RBC AUTO: 98.4 FL — SIGNIFICANT CHANGE UP (ref 80–100)
PHOSPHATE SERPL-MCNC: 2.3 MG/DL — LOW (ref 2.5–4.5)
PHOSPHATE SERPL-MCNC: 4 MG/DL — SIGNIFICANT CHANGE UP (ref 2.5–4.5)
PLATELET # BLD AUTO: 299 K/UL — SIGNIFICANT CHANGE UP (ref 150–400)
PLATELET # BLD AUTO: 334 K/UL — SIGNIFICANT CHANGE UP (ref 150–400)
POTASSIUM SERPL-MCNC: 3.3 MMOL/L — LOW (ref 3.5–5.3)
POTASSIUM SERPL-MCNC: 4.2 MMOL/L — SIGNIFICANT CHANGE UP (ref 3.5–5.3)
POTASSIUM SERPL-SCNC: 3.3 MMOL/L — LOW (ref 3.5–5.3)
POTASSIUM SERPL-SCNC: 4.2 MMOL/L — SIGNIFICANT CHANGE UP (ref 3.5–5.3)
PROT SERPL-MCNC: 6.4 G/DL — SIGNIFICANT CHANGE UP (ref 6–8.3)
PROT SERPL-MCNC: 7.2 G/DL — SIGNIFICANT CHANGE UP (ref 6–8.3)
PROTHROM AB SERPL-ACNC: 11.3 SEC — SIGNIFICANT CHANGE UP (ref 9.8–12.7)
RBC # BLD: 2.15 M/UL — LOW (ref 3.8–5.2)
RBC # BLD: 2.36 M/UL — LOW (ref 3.8–5.2)
RBC # FLD: 15.9 % — HIGH (ref 10.3–14.5)
RBC # FLD: 16.1 % — HIGH (ref 10.3–14.5)
RH IG SCN BLD-IMP: POSITIVE — SIGNIFICANT CHANGE UP
SODIUM SERPL-SCNC: 137 MMOL/L — SIGNIFICANT CHANGE UP (ref 135–145)
SODIUM SERPL-SCNC: 141 MMOL/L — SIGNIFICANT CHANGE UP (ref 135–145)
WBC # BLD: 5.1 K/UL — SIGNIFICANT CHANGE UP (ref 3.8–10.5)
WBC # BLD: 5.5 K/UL — SIGNIFICANT CHANGE UP (ref 3.8–10.5)
WBC # FLD AUTO: 5.1 K/UL — SIGNIFICANT CHANGE UP (ref 3.8–10.5)
WBC # FLD AUTO: 5.5 K/UL — SIGNIFICANT CHANGE UP (ref 3.8–10.5)

## 2018-03-12 PROCEDURE — 99233 SBSQ HOSP IP/OBS HIGH 50: CPT | Mod: GC

## 2018-03-12 PROCEDURE — 99232 SBSQ HOSP IP/OBS MODERATE 35: CPT

## 2018-03-12 PROCEDURE — 99291 CRITICAL CARE FIRST HOUR: CPT

## 2018-03-12 PROCEDURE — 99232 SBSQ HOSP IP/OBS MODERATE 35: CPT | Mod: GC

## 2018-03-12 RX ORDER — CISATRACURIUM BESYLATE 2 MG/ML
20 INJECTION INTRAVENOUS ONCE
Qty: 0 | Refills: 0 | Status: COMPLETED | OUTPATIENT
Start: 2018-03-12 | End: 2018-03-12

## 2018-03-12 RX ORDER — ERYTHROPOIETIN 10000 [IU]/ML
10000 INJECTION, SOLUTION INTRAVENOUS; SUBCUTANEOUS ONCE
Qty: 0 | Refills: 0 | Status: COMPLETED | OUTPATIENT
Start: 2018-03-12 | End: 2018-03-12

## 2018-03-12 RX ADMIN — HEPARIN SODIUM 5000 UNIT(S): 5000 INJECTION INTRAVENOUS; SUBCUTANEOUS at 21:46

## 2018-03-12 RX ADMIN — Medication 4 MILLIGRAM(S): at 17:33

## 2018-03-12 RX ADMIN — NYSTATIN CREAM 1 APPLICATION(S): 100000 CREAM TOPICAL at 17:06

## 2018-03-12 RX ADMIN — HEPARIN SODIUM 5000 UNIT(S): 5000 INJECTION INTRAVENOUS; SUBCUTANEOUS at 13:12

## 2018-03-12 RX ADMIN — Medication 3 MILLILITER(S): at 12:12

## 2018-03-12 RX ADMIN — PROPOFOL 8.98 MICROGRAM(S)/KG/MIN: 10 INJECTION, EMULSION INTRAVENOUS at 19:58

## 2018-03-12 RX ADMIN — CISATRACURIUM BESYLATE 20 MILLIGRAM(S): 2 INJECTION INTRAVENOUS at 15:35

## 2018-03-12 RX ADMIN — PANTOPRAZOLE SODIUM 40 MILLIGRAM(S): 20 TABLET, DELAYED RELEASE ORAL at 11:04

## 2018-03-12 RX ADMIN — Medication 81 MILLIGRAM(S): at 11:03

## 2018-03-12 RX ADMIN — SODIUM CHLORIDE 3 MILLILITER(S): 9 INJECTION INTRAMUSCULAR; INTRAVENOUS; SUBCUTANEOUS at 05:40

## 2018-03-12 RX ADMIN — SODIUM CHLORIDE 3 MILLILITER(S): 9 INJECTION INTRAMUSCULAR; INTRAVENOUS; SUBCUTANEOUS at 00:29

## 2018-03-12 RX ADMIN — NYSTATIN CREAM 1 APPLICATION(S): 100000 CREAM TOPICAL at 06:12

## 2018-03-12 RX ADMIN — CALAMINE 8% AND ZINC OXIDE 8% 1 APPLICATION(S): 160 LOTION TOPICAL at 11:08

## 2018-03-12 RX ADMIN — Medication 3 MILLILITER(S): at 17:18

## 2018-03-12 RX ADMIN — PROPOFOL 8.98 MICROGRAM(S)/KG/MIN: 10 INJECTION, EMULSION INTRAVENOUS at 06:14

## 2018-03-12 RX ADMIN — HEPARIN SODIUM 5000 UNIT(S): 5000 INJECTION INTRAVENOUS; SUBCUTANEOUS at 06:12

## 2018-03-12 RX ADMIN — Medication 3 MILLILITER(S): at 00:29

## 2018-03-12 RX ADMIN — Medication 3 MILLILITER(S): at 05:39

## 2018-03-12 RX ADMIN — SODIUM CHLORIDE 3 MILLILITER(S): 9 INJECTION INTRAMUSCULAR; INTRAVENOUS; SUBCUTANEOUS at 12:13

## 2018-03-12 RX ADMIN — SODIUM CHLORIDE 3 MILLILITER(S): 9 INJECTION INTRAMUSCULAR; INTRAVENOUS; SUBCUTANEOUS at 17:18

## 2018-03-12 RX ADMIN — ERYTHROPOIETIN 10000 UNIT(S): 10000 INJECTION, SOLUTION INTRAVENOUS; SUBCUTANEOUS at 12:36

## 2018-03-12 NOTE — PROGRESS NOTE ADULT - ASSESSMENT
69 y/o F with T2DM uncontrolled with neuropathy and ESRD now on HD on insulin, osteoporosis, HTN, here with acute respiratory failure 2/2 influenza s/p CVA and PEA arrest x 2, and MSSA bacteremia on month long tx for aspiration pna, re-intubated on 3.11.18 for hypoxia/respiratory distress.

## 2018-03-12 NOTE — PROGRESS NOTE ADULT - ASSESSMENT
68 year old female with HTN, DMT2, Breast Cancer (diagnosed in Feb 2017), originally presented on 1/30 with fever found to have influenza subsequently went into PEA arrest x 2 with ROSC, complicated by bilateral pneumothoraces with pneumomediastinum s/p 3 chest tubes, and new ESRD. Patient has had 2 readmissions to the MICU for hypoxic respiratory failure likely 2/2 aspiration pneumonia with intubations and MSSA PNA.   GI consulted for peg tube placement.     Impression:   - Dysphagia likely 2/2 severe illness requiring management in the ICU  - Hypoxic respiratory failure, currently ventilated   - ESRD on HD    Plan:   - trend CBC, CMP, INR  - plan for peg tube placement, possibly on Wednesday (peg tube will be placed after the tracheostomy is done)  - please ensure that peg tube procedure is done before the hemodialysis on Wed  - keep NPO after midnight on Tues  - plan discussed with  and daughter, discussed risks and benefits of the procedure  - supportive care as per primary team    GI team will continue to follow.  Thank you for the consult.

## 2018-03-12 NOTE — PROGRESS NOTE ADULT - SUBJECTIVE AND OBJECTIVE BOX
Chief Complaint:  Patient is a 68y old  Female who presents with a chief complaint of Fever, total body weakness (2018 13:44)      Interval Events:     MICU note:   68 year old female with HTN, DMT2, Breast Cancer (diagnosed in 2017) currently on trastuzumab (last dose ), originally presented on  with fever found to have influenza subsequently went into PEA arrest x 2 with ROSC, complicated by bilateral pneumothoraces with pneumomediastinum s/p 3 chest tubes, and new ESRD, readmitted to the MICU for hypoxic respiratory failure likely 2/2 aspiration pneumonia with MSSA PNA and bacteremia on , extubated on 3/1 with stable respiratory status, now readmitted to the MICU for hypoxic respiratory failure likely secondary to a recurrent aspiration event.    GI consulted for peg tube placement in the setting of dysphagia. Patient has had 3 intubations for hypoxic respiratory failure.     Allergies:  doxycycline (Rash)  isoniazid (Rash)  NIFEdipine (Urticaria; Hives)  vitamin E (Short breath; Urticaria; Hives)      Hospital Medications:  acetaminophen    Suspension. 650 milliGRAM(s) Oral every 6 hours PRN  ALBUTerol/ipratropium for Nebulization 3 milliLiter(s) Nebulizer every 6 hours  aspirin  chewable 81 milliGRAM(s) Oral daily  calamine Lotion 1 Application(s) Topical daily  ceFAZolin   IVPB 2000 milliGRAM(s) IV Intermittent <User Schedule>  ceFAZolin   IVPB 3000 milliGRAM(s) IV Intermittent <User Schedule>  chlorhexidine 0.12% Liquid 15 milliLiter(s) Swish and Spit every 4 hours PRN  cisatracurium Injectable 20 milliGRAM(s) IV Push once  dextrose 5%. 1000 milliLiter(s) IV Continuous <Continuous>  dextrose 5%. 1000 milliLiter(s) IV Continuous <Continuous>  dextrose 5%. 1000 milliLiter(s) IV Continuous <Continuous>  dextrose 50% Injectable 12.5 Gram(s) IV Push once  dextrose 50% Injectable 25 Gram(s) IV Push once  dextrose 50% Injectable 25 Gram(s) IV Push once  dextrose Gel 1 Dose(s) Oral once PRN  epoetin odalis Injectable 92123 Unit(s) IV Push <User Schedule>  glucagon  Injectable 1 milliGRAM(s) IntraMuscular once PRN  heparin  Injectable 5000 Unit(s) SubCutaneous every 8 hours  hydrocortisone 1% Ointment 1 Application(s) Topical every 8 hours PRN  insulin lispro (HumaLOG) corrective regimen sliding scale   SubCutaneous every 6 hours  insulin NPH human recombinant 18 Unit(s) SubCutaneous <User Schedule>  nystatin Powder 1 Application(s) Topical two times a day  ondansetron Injectable 4 milliGRAM(s) IV Push every 6 hours PRN  pantoprazole  Injectable 40 milliGRAM(s) IV Push daily  propofol Infusion 20 MICROgram(s)/kG/Min IV Continuous <Continuous>  sodium chloride 3%  Inhalation 3 milliLiter(s) Inhalation four times a day      PMHX/PSHX:  Diabetes  Breast CA  H/O mastectomy, bilateral  No significant past surgical history      Family history:  No pertinent family history in first degree relatives      ROS:     unable to assess       PHYSICAL EXAM:     GENERAL:  Appears stated age, well-groomed, sedated   HEENT:  NC/AT,  conjunctivae clear  CHEST:  ETT in place, NG present, vent sounds+  HEART:  Regular rhythm, S1, S2  ABDOMEN:  Soft, non-tender, non-distended, normoactive bowel sounds  EXTREMITIES:  no edema  SKIN:  No rash  NEURO:  sedated     Vital Signs:  Vital Signs Last 24 Hrs  T(C): 36.8 (12 Mar 2018 12:01), Max: 37.4 (12 Mar 2018 00:00)  T(F): 98.3 (12 Mar 2018 12:01), Max: 99.4 (12 Mar 2018 00:00)  HR: 80 (12 Mar 2018 14:00) (59 - 90)  BP: 148/63 (12 Mar 2018 14:00) (80/44 - 165/72)  BP(mean): 91 (12 Mar 2018 14:00) (57 - 103)  RR: 18 (12 Mar 2018 14:00) (14 - 24)  SpO2: 100% (12 Mar 2018 14:00) (100% - 100%)  Daily     Daily Weight in k.4 (12 Mar 2018 12:01)    LABS:                        7.0    5.1   )-----------( 299      ( 11 Mar 2018 23:57 )             21.2     03-    141  |  102  |  68<H>  ----------------------------<  111<H>  4.2   |  25  |  3.47<H>    Ca    9.1      11 Mar 2018 23:57  Phos  4.0       Mg     2.6         TPro  6.4  /  Alb  2.1<L>  /  TBili  0.2  /  DBili  x   /  AST  21  /  ALT  <5<L>  /  AlkPhos  235<H>      LIVER FUNCTIONS - ( 11 Mar 2018 23:57 )  Alb: 2.1 g/dL / Pro: 6.4 g/dL / ALK PHOS: 235 U/L / ALT: <5 U/L RC / AST: 21 U/L / GGT: x           PT/INR - ( 11 Mar 2018 23:57 )   PT: 11.3 sec;   INR: 1.05 ratio         PTT - ( 11 Mar 2018 23:57 )  PTT:30.7 sec        Imaging:

## 2018-03-12 NOTE — PROGRESS NOTE ADULT - PROBLEM SELECTOR PLAN 1
-test BG Q6h  -moderate scale q6  -discontinue NPH. If she has at least 2 bs greater than 200 can add Lantus 10 units sq qhs  -Patria Weiner MD

## 2018-03-12 NOTE — PROGRESS NOTE ADULT - SUBJECTIVE AND OBJECTIVE BOX
[facility] Division of Kidney Diseases & Hypertension  FOLLOW UP NOTE  [phone]--------------------------------------------------------------------------------  Chief Complaint:Cardiac arrest      24 hour events/subjective:    [facility]    PAST HISTORY  --------------------------------------------------------------------------------  No significant changes to PMH, PSH, FHx, SHx, unless otherwise noted    ALLERGIES & MEDICATIONS  --------------------------------------------------------------------------------  Allergies    doxycycline (Rash)  isoniazid (Rash)  NIFEdipine (Urticaria; Hives)  vitamin E (Short breath; Urticaria; Hives)    Intolerances      Standing Inpatient Medications  ALBUTerol/ipratropium for Nebulization 3 milliLiter(s) Nebulizer every 6 hours  aspirin  chewable 81 milliGRAM(s) Oral daily  calamine Lotion 1 Application(s) Topical daily  ceFAZolin   IVPB 2000 milliGRAM(s) IV Intermittent <User Schedule>  ceFAZolin   IVPB 3000 milliGRAM(s) IV Intermittent <User Schedule>  dextrose 5%. 1000 milliLiter(s) IV Continuous <Continuous>  dextrose 5%. 1000 milliLiter(s) IV Continuous <Continuous>  dextrose 5%. 1000 milliLiter(s) IV Continuous <Continuous>  dextrose 50% Injectable 12.5 Gram(s) IV Push once  dextrose 50% Injectable 25 Gram(s) IV Push once  dextrose 50% Injectable 25 Gram(s) IV Push once  epoetin odalis Injectable 37109 Unit(s) IV Push <User Schedule>  heparin  Injectable 5000 Unit(s) SubCutaneous every 8 hours  insulin lispro (HumaLOG) corrective regimen sliding scale   SubCutaneous every 6 hours  insulin NPH human recombinant 18 Unit(s) SubCutaneous <User Schedule>  norepinephrine Infusion 0.01 MICROgram(s)/kG/Min IV Continuous <Continuous>  nystatin Powder 1 Application(s) Topical two times a day  pantoprazole  Injectable 40 milliGRAM(s) IV Push daily  propofol Infusion 20 MICROgram(s)/kG/Min IV Continuous <Continuous>  sodium chloride 3%  Inhalation 3 milliLiter(s) Inhalation four times a day    PRN Inpatient Medications  acetaminophen    Suspension. 650 milliGRAM(s) Oral every 6 hours PRN  chlorhexidine 0.12% Liquid 15 milliLiter(s) Swish and Spit every 4 hours PRN  dextrose Gel 1 Dose(s) Oral once PRN  glucagon  Injectable 1 milliGRAM(s) IntraMuscular once PRN  hydrocortisone 1% Ointment 1 Application(s) Topical every 8 hours PRN  ondansetron Injectable 4 milliGRAM(s) IV Push every 6 hours PRN      REVIEW OF SYSTEMS  --------------------------------------------------------------------------------  Unable to obtain.     VITALS/PHYSICAL EXAM  --------------------------------------------------------------------------------  T(C): 36.1 (03-12-18 @ 08:00), Max: 37.4 (03-12-18 @ 00:00)  HR: 86 (03-12-18 @ 07:42) (59 - 107)  BP: 145/64 (03-12-18 @ 07:00) (75/40 - 189/151)  RR: 18 (03-12-18 @ 07:00) (14 - 24)  SpO2: 100% (03-12-18 @ 07:42) (93% - 100%)  Wt(kg): --    Weight (kg): 73 (03-11-18 @ 13:47)      03-11-18 @ 07:01  -  03-12-18 @ 07:00  --------------------------------------------------------  IN: 622.2 mL / OUT: 0 mL / NET: 622.2 mL    03-12-18 @ 07:01  -  03-12-18 @ 08:01  --------------------------------------------------------  IN: 19.7 mL / OUT: 0 mL / NET: 19.7 mL      Physical Exam:  	  	Gen: Intubated, on mechanical ventilator   	HEENT: PERRL, supple neck, no jvp  	Pulm: CTA B/L  	CV: RRR, S1S2; no rub  	Back: No spinal or CVA tenderness; no sacral edema  	Abd: +BS, soft, nontender/nondistended  	: No suprapubic tenderness  	Ext: no edema  	Neuro: Sedated  	Skin: Warm, without rashes  	Vascular access:+PC      LABS/STUDIES  --------------------------------------------------------------------------------              7.0    5.1   >-----------<  299      [03-11-18 @ 23:57]              21.2     141  |  102  |  68  ----------------------------<  111      [03-11-18 @ 23:57]  4.2   |  25  |  3.47        Ca     9.1     [03-11-18 @ 23:57]      Mg     2.6     [03-11-18 @ 23:57]      Phos  4.0     [03-11-18 @ 23:57]    TPro  6.4  /  Alb  2.1  /  TBili  0.2  /  DBili  x   /  AST  21  /  ALT  <5  /  AlkPhos  235  [03-11-18 @ 23:57]    PT/INR: PT 11.3 , INR 1.05       [03-11-18 @ 23:57]  PTT: 30.7       [03-11-18 @ 23:57]      Creatinine Trend:  SCr 3.47 [03-11 @ 23:57]  SCr 3.38 [03-11 @ 14:53]  SCr 3.17 [03-11 @ 11:21]  SCr 2.58 [03-10 @ 07:20]  SCr 5.34 [03-09 @ 07:10]        Iron 32, TIBC 248, %sat 13      [03-07-18 @ 14:34]  Ferritin 174      [03-07-18 @ 14:34]

## 2018-03-12 NOTE — PROGRESS NOTE ADULT - ATTENDING COMMENTS
#RUSS- oligoanuric  ATN-  increase in UO per /daughter; HD held yesterday; cr still uptrending.plan HD today and then monitor electrolytes over the weekend  #access- had shiley- s/p permcath 3/9; outpt HD center to be setup by SALTY  #anemia -epo tiw; change to SC; check iron studies  #infection/bacteremia- on dialysis dosing for cefazolin; #RUSS- oligoanuric  ATN-  increase in UO per /daughter; but no improvement in bun/cr; plan hd today; monitoring bladder scan in MICU  #access- had shiley- s/p permcath 3/9;   #for peg/trach per micu; optimize today with HD  #anemia -epo tiw with HD; change to SC; check iron studies

## 2018-03-12 NOTE — PROGRESS NOTE ADULT - SUBJECTIVE AND OBJECTIVE BOX
CHIEF COMPLAINT:    Interval Events:    REVIEW OF SYSTEMS:  Constitutional: [ ] negative [ ] fevers [ ] chills [ ] weight loss [ ] weight gain  HEENT: [ ] negative [ ] dry eyes [ ] eye irritation [ ] postnasal drip [ ] nasal congestion  CV: [ ] negative  [ ] chest pain [ ] orthopnea [ ] palpitations [ ] murmur  Resp: [ ] negative [ ] cough [ ] shortness of breath [ ] dyspnea [ ] wheezing [ ] sputum [ ] hemoptysis  GI: [ ] negative [ ] nausea [ ] vomiting [ ] diarrhea [ ] constipation [ ] abd pain [ ] dysphagia   : [ ] negative [ ] dysuria [ ] nocturia [ ] hematuria [ ] increased urinary frequency  Musculoskeletal: [ ] negative [ ] back pain [ ] myalgias [ ] arthralgias [ ] fracture  Skin: [ ] negative [ ] rash [ ] itch  Neurological: [ ] negative [ ] headache [ ] dizziness [ ] syncope [ ] weakness [ ] numbness  Psychiatric: [ ] negative [ ] anxiety [ ] depression  Endocrine: [ ] negative [ ] diabetes [ ] thyroid problem  Hematologic/Lymphatic: [ ] negative [ ] anemia [ ] bleeding problem  Allergic/Immunologic: [ ] negative [ ] itchy eyes [ ] nasal discharge [ ] hives [ ] angioedema  [ ] All other systems negative  [ ] Unable to assess ROS because ________    OBJECTIVE:  ICU Vital Signs Last 24 Hrs  T(C): 36.2 (12 Mar 2018 04:00), Max: 37.4 (12 Mar 2018 00:00)  T(F): 97.1 (12 Mar 2018 04:00), Max: 99.4 (12 Mar 2018 00:00)  HR: 82 (12 Mar 2018 07:00) (59 - 107)  BP: 145/64 (12 Mar 2018 07:00) (75/40 - 189/151)  BP(mean): 92 (12 Mar 2018 07:00) (52 - 109)  ABP: --  ABP(mean): --  RR: 18 (12 Mar 2018 07:00) (14 - 24)  SpO2: 100% (12 Mar 2018 07:00) (93% - 100%)    Mode: AC/ CMV (Assist Control/ Continuous Mandatory Ventilation), RR (machine): 18, TV (machine): 400, FiO2: 40, PEEP: 5, ITime: 1, MAP: 10, PIP: 30    03-11 @ 07:01  -  03-12 @ 07:00  --------------------------------------------------------  IN: 622.2 mL / OUT: 0 mL / NET: 622.2 mL      CAPILLARY BLOOD GLUCOSE      POCT Blood Glucose.: 150 mg/dL (12 Mar 2018 06:13)      PHYSICAL EXAM:  General:   HEENT:   Lymph Nodes:  Neck:   Respiratory:   Cardiovascular:   Abdomen:   Extremities:   Skin:   Neurological:  Psychiatry:    LINES:    HOSPITAL MEDICATIONS:  Standing Meds:  ALBUTerol/ipratropium for Nebulization 3 milliLiter(s) Nebulizer every 6 hours  aspirin  chewable 81 milliGRAM(s) Oral daily  calamine Lotion 1 Application(s) Topical daily  ceFAZolin   IVPB 2000 milliGRAM(s) IV Intermittent <User Schedule>  ceFAZolin   IVPB 3000 milliGRAM(s) IV Intermittent <User Schedule>  dextrose 5%. 1000 milliLiter(s) IV Continuous <Continuous>  dextrose 5%. 1000 milliLiter(s) IV Continuous <Continuous>  dextrose 5%. 1000 milliLiter(s) IV Continuous <Continuous>  dextrose 50% Injectable 12.5 Gram(s) IV Push once  dextrose 50% Injectable 25 Gram(s) IV Push once  dextrose 50% Injectable 25 Gram(s) IV Push once  epoetin odalis Injectable 54818 Unit(s) IV Push <User Schedule>  heparin  Injectable 5000 Unit(s) SubCutaneous every 8 hours  insulin lispro (HumaLOG) corrective regimen sliding scale   SubCutaneous every 6 hours  insulin NPH human recombinant 18 Unit(s) SubCutaneous <User Schedule>  norepinephrine Infusion 0.01 MICROgram(s)/kG/Min IV Continuous <Continuous>  nystatin Powder 1 Application(s) Topical two times a day  pantoprazole  Injectable 40 milliGRAM(s) IV Push daily  propofol Infusion 20 MICROgram(s)/kG/Min IV Continuous <Continuous>  sodium chloride 3%  Inhalation 3 milliLiter(s) Inhalation four times a day      PRN Meds:  acetaminophen    Suspension. 650 milliGRAM(s) Oral every 6 hours PRN  chlorhexidine 0.12% Liquid 15 milliLiter(s) Swish and Spit every 4 hours PRN  dextrose Gel 1 Dose(s) Oral once PRN  glucagon  Injectable 1 milliGRAM(s) IntraMuscular once PRN  hydrocortisone 1% Ointment 1 Application(s) Topical every 8 hours PRN  ondansetron Injectable 4 milliGRAM(s) IV Push every 6 hours PRN      LABS:                        7.0    5.1   )-----------( 299      ( 11 Mar 2018 23:57 )             21.2     Hgb Trend: 7.0<--, 7.4<--, 8.2<--, 7.7<--, 7.4<--  03-11    141  |  102  |  68<H>  ----------------------------<  111<H>  4.2   |  25  |  3.47<H>    Ca    9.1      11 Mar 2018 23:57  Phos  4.0     03-11  Mg     2.6     03-11    TPro  6.4  /  Alb  2.1<L>  /  TBili  0.2  /  DBili  x   /  AST  21  /  ALT  <5<L>  /  AlkPhos  235<H>  03-11    Creatinine Trend: 3.47<--, 3.38<--, 3.17<--, 2.58<--, 5.34<--, 4.97<--  PT/INR - ( 11 Mar 2018 23:57 )   PT: 11.3 sec;   INR: 1.05 ratio         PTT - ( 11 Mar 2018 23:57 )  PTT:30.7 sec    Arterial Blood Gas:  03-11 @ 23:52  7.47/38/145/28/100/4.1  ABG lactate: --  Arterial Blood Gas:  03-11 @ 18:18  7.44/42/63/28/96/4.5  ABG lactate: --  Arterial Blood Gas:  03-11 @ 14:45  7.30/60/106/29/99/2.5  ABG lactate: --        MICROBIOLOGY:     RADIOLOGY:  [ ] Reviewed and interpreted by me    EKG:    Assessment/Plan:  68F PMH HTN, DMT2, Breast Cancer (diagnosed in Feb 2017) currently on trastuzumab (last dose Jan 20th), originally presented 1/30 with fever found to have influenza subsequently went into PEA arrest x 2 with ROSC, complicated by bilateral pneumothoraces with pneumomediastinum s/p 3 chest tubes, and new ESRD, readmitted to the MICU for hypoxic respiratory failure likely 2/2 aspiration pneumonia with MSSA PNA and bacteremia on 2/21, extubated on 3/1 with stable respiratory status, now readmitted to the MICU for hypoxic respiratory failure likely secondary to a recurrent aspiration event.      # Neuro:  - currently intubated and sedated  - on propofol gtt, titrate to RASS 0 to -2  - Known watershed ischemia of the occiput from low flow state during admission 2/2 flu    # CV:  - HD stable, off pressors since 3/11, can restart levo if needed while patient on propofol  - goal MAPs > 65  - c/w carvedilol 6.25 q 12 for now if blood pressure remains normotensive      # Pulm:  - reintubated for hypoxic respiratory failure (third intubation)  - daily SBTs  - may need eventual tracheostomy  - if patient with marked secretions- will start 3% saline with duonebs  - no signs or symptoms of PNA at this time      # Renal:  - on HD 3x/ week- T,Th,S  - s/p permacath placement by IR    # ID:    -concern for recurrent aspiration event  - if patient dale white count or fever- will start zosyn  - recent MSSA bacteremia with clearance of blood cultures- c/w ancef until March 24th (- ancef scheduling per dialysis schedule (i.e. If schedule T-T-Sa (2-2-3 grams) )  - echo after negative blood cultures shows no signs of vegetation    # GI:  - NPO for now as patient likely aspirated on tube feeds  - GI consult for PEG tube  - GI ppx with protonix 40mg daily    # Endocrine:  - hold NPH when tube feeds on hold  - c/w sliding scale insulin    # Heme:  - Anemia, 7.0/21 today. Will continue to trend H/H, consider transfusion w/ HD for hemoglobin < 7.0.  - continue epo for chronic ESRD-related anemia  - Currently holding Herceptin    # Skin:  - mild erythema      # DVT ppx:  - HSC 5000 units Q8 hours

## 2018-03-12 NOTE — PROGRESS NOTE ADULT - ASSESSMENT
68 F PMH HTN, DM2, Breast Cancer (diagnosed in Feb 2017) currently on trastuzumab (last dose Jan 20th), originally presented 1/30 with fever found to have influenza subsequently went into PEA arrest x 2 , complicated by bilateral pneumothoraces with pneumomediastinum s/p 3 chest tubes, and also new ESRD requiring almost daily HD, readmitted to the MICU for hypoxic respiratory failure likely 2/2 aspiration pneumonia with MSSA bacteremia and pneumonia, patient re-intubated, likely for trach placement. BCX clear on 2/24. Fever x1 over weekend, not persistent. Monitor. Overall, MSSA bacteremia, pna, respiratory failure, intubated.  - Cefazolin post HD (2g, 2g, 3g) through 3/22  - If worsening fevers, cough, SOB, leukocytosis--likely broaden to cover aspiration pneumonia  - Aspiration precautions    Yossi Durant MD  Pager [phone]  After 5pm and on weekends call [phone]

## 2018-03-12 NOTE — PROGRESS NOTE ADULT - PROBLEM SELECTOR PLAN 1
from ATN in setting of cardiac arrest. Pt continues to be oligo-anuric, requiring dialysis. No evidence of renal recovery at present.  Patient s/p tunneled HD catheter placement  by IR . Will plan for maintenance HD today. Monitor BMP daily.

## 2018-03-12 NOTE — PROGRESS NOTE ADULT - SUBJECTIVE AND OBJECTIVE BOX
Chief Complaint/Follow-up on: T2DM    Subjective: Pt re-intubated 1 day ago s/p episode of hypoxia/respiratory distress. Patient's  is at the bedisde concerned about his wife. She is awaiting a trach tomorrow and PEG on Wednesday.    MEDICATIONS  (STANDING):  ALBUTerol/ipratropium for Nebulization 3 milliLiter(s) Nebulizer every 6 hours  aspirin  chewable 81 milliGRAM(s) Oral daily  calamine Lotion 1 Application(s) Topical daily  ceFAZolin   IVPB 2000 milliGRAM(s) IV Intermittent <User Schedule>  ceFAZolin   IVPB 3000 milliGRAM(s) IV Intermittent <User Schedule>  dextrose 5%. 1000 milliLiter(s) (50 mL/Hr) IV Continuous <Continuous>  dextrose 5%. 1000 milliLiter(s) (50 mL/Hr) IV Continuous <Continuous>  dextrose 5%. 1000 milliLiter(s) (50 mL/Hr) IV Continuous <Continuous>  dextrose 50% Injectable 12.5 Gram(s) IV Push once  dextrose 50% Injectable 25 Gram(s) IV Push once  dextrose 50% Injectable 25 Gram(s) IV Push once  epoetin odalis Injectable 27410 Unit(s) IV Push <User Schedule>  heparin  Injectable 5000 Unit(s) SubCutaneous every 8 hours  insulin lispro (HumaLOG) corrective regimen sliding scale   SubCutaneous every 6 hours  nystatin Powder 1 Application(s) Topical two times a day  pantoprazole  Injectable 40 milliGRAM(s) IV Push daily  propofol Infusion 20 MICROgram(s)/kG/Min (8.976 mL/Hr) IV Continuous <Continuous>  sodium chloride 3%  Inhalation 3 milliLiter(s) Inhalation four times a day    MEDICATIONS  (PRN):  acetaminophen    Suspension. 650 milliGRAM(s) Oral every 6 hours PRN Mild Pain (1 - 3)  chlorhexidine 0.12% Liquid 15 milliLiter(s) Swish and Spit every 4 hours PRN mouth hygiene  dextrose Gel 1 Dose(s) Oral once PRN Blood Glucose LESS THAN 70 milliGRAM(s)/deciliter  glucagon  Injectable 1 milliGRAM(s) IntraMuscular once PRN Glucose LESS THAN 70 milligrams/deciliter  hydrocortisone 1% Ointment 1 Application(s) Topical every 8 hours PRN Rash and/or Itching  ondansetron Injectable 4 milliGRAM(s) IV Push every 6 hours PRN Nausea and/or Vomiting      PHYSICAL EXAM:  VITALS: T(C): 36.1 (03-12-18 @ 15:01)  T(F): 97 (03-12-18 @ 15:01), Max: 99.4 (03-12-18 @ 00:00)  HR: 83 (03-12-18 @ 15:01) (59 - 90)  BP: 127/61 (03-12-18 @ 15:01) (110/51 - 165/72)  RR:  (18 - 24)  SpO2:  (100% - 100%)  Wt(kg): --  GEN: Pt intubated and sedated  HEENT:  Atraumatic, Normocephalic, moist mucous membranes, +NGT in L nare  RESPIRATORY: Clear to auscultation bilaterally; No rales, rhonchi, wheezing, or rubs  CARDIOVASCULAR: Regular rate and rhythm; No murmurs  GI: Soft, nontender, non distended, normal bowel sounds    POCT Blood Glucose.: 142 mg/dL (03-12-18 @ 10:49)  POCT Blood Glucose.: 150 mg/dL (03-12-18 @ 06:13)  POCT Blood Glucose.: 109 mg/dL (03-11-18 @ 23:55)  POCT Blood Glucose.: 159 mg/dL (03-11-18 @ 17:02)  POCT Blood Glucose.: 177 mg/dL (03-11-18 @ 13:23)  POCT Blood Glucose.: 182 mg/dL (03-11-18 @ 12:22)  POCT Blood Glucose.: 107 mg/dL (03-11-18 @ 06:09)  POCT Blood Glucose.: 121 mg/dL (03-10-18 @ 23:24)  POCT Blood Glucose.: 184 mg/dL (03-10-18 @ 18:03)  POCT Blood Glucose.: 197 mg/dL (03-10-18 @ 12:06)  POCT Blood Glucose.: 112 mg/dL (03-10-18 @ 05:55)  POCT Blood Glucose.: 143 mg/dL (03-09-18 @ 23:17)  POCT Blood Glucose.: 163 mg/dL (03-09-18 @ 18:51)    03-11    141  |  102  |  68<H>  ----------------------------<  111<H>  4.2   |  25  |  3.47<H>    EGFR if : 15<L>  EGFR if non : 13<L>    Ca    9.1      03-11  Mg     2.6     03-11  Phos  4.0     03-11    TPro  6.4  /  Alb  2.1<L>  /  TBili  0.2  /  DBili  x   /  AST  21  /  ALT  <5<L>  /  AlkPhos  235<H>  03-11      Hemoglobin A1C, Whole Blood: 8.6 % <H> [4.0 - 5.6] (01-31-18 @ 07:15)  Hemoglobin A1C, Whole Blood: 8.7 % <H> [4.0 - 5.6] (01-31-18 @ 05:51)

## 2018-03-12 NOTE — PROGRESS NOTE ADULT - ATTENDING COMMENTS
1.Acute hypoxemic respiratory failure due to recurrent aspiration pneumonitis. Continue current AC vent changes. Family has agreed for tracheostomy and PEG.  2. MSSA bacteremia. Cleared. Continue 4 week course of ANCEF.  3. Now with Renal failure requiring dialysis. Initially ATN.    CC time 35 min

## 2018-03-12 NOTE — PROGRESS NOTE ADULT - SUBJECTIVE AND OBJECTIVE BOX
CC: F/U for MSSA    Saw/spoke to patient. No fevers, no chills. Patient intubated in MICU. Sedated, minimally responsive. Family at bedside.    Allergies  doxycycline (Rash)  isoniazid (Rash)  NIFEdipine (Urticaria; Hives)  vitamin E (Short breath; Urticaria; Hives)      ANTIMICROBIALS:  ceFAZolin   IVPB 2000 <User Schedule>  ceFAZolin   IVPB 3000 <User Schedule>    PE:    Vital Signs Last 24 Hrs  T(C): 36.7 (12 Mar 2018 12:00), Max: 37.4 (12 Mar 2018 00:00)  T(F): 98 (12 Mar 2018 12:00), Max: 99.4 (12 Mar 2018 00:00)  HR: 77 (12 Mar 2018 12:13) (59 - 107)  BP: 160/70 (12 Mar 2018 11:00) (75/40 - 189/151)  BP(mean): 101 (12 Mar 2018 11:00) (52 - 109)  RR: 18 (12 Mar 2018 11:00) (14 - 24)  SpO2: 100% (12 Mar 2018 12:13) (93% - 100%)    Gen: AOx0, sedated, minimal  CV: S1+S2 normal, no murmurs, nontachycardic  Resp: Intubated, no crackles no wheeze  Abd: Soft, nontender, +BS  Ext: No LE edema, no wounds    LABS:                        7.0    5.1   )-----------( 299      ( 11 Mar 2018 23:57 )             21.2     03-11    141  |  102  |  68<H>  ----------------------------<  111<H>  4.2   |  25  |  3.47<H>    Ca    9.1      11 Mar 2018 23:57  Phos  4.0     03-11  Mg     2.6     03-11    TPro  6.4  /  Alb  2.1<L>  /  TBili  0.2  /  DBili  x   /  AST  21  /  ALT  <5<L>  /  AlkPhos  235<H>  03-11    MICROBIOLOGY:    .Blood Blood-Venous  02-27-18   No growth at 5 days.     .Blood Blood-Peripheral  02-24-18   No growth at 5 days.     .Sputum Sputum trap  02-22-18   Numerous Staphylococcus aureus ***********Note************  This isolate demonstrates inducible  clindamycin resistance.  Clindamycin may still be effective in some patients.  No Normal Respiratory Cate present  --  Staphylococcus aureus (MSSA0    .Blood Blood  02-22-18   Growth in anaerobic bottle: Staphylococcus aureus  (MSSA)    .Blood Blood  02-22-18   No growth at 5 days.    .Blood Blood-Peripheral  02-19-18   No growth at 5 days.     RADIOLOGY:    3/11 CXR:    FINDINGS:  Endotracheal tube tip is above the ines. Enteric tube courses below the   diaphragm, distal tip is incompletely imaged. Right-sided central venous   catheter and left-sided MediPort tips are in the SVC.   Pulmonary edema, unchanged.  Small right pleural effusion, unchanged. No pneumothorax.    IMPRESSION: Endotracheal tube tip is above the ines. Small right   pleural effusion and pulmonary edema, unchanged.

## 2018-03-13 LAB
ALBUMIN SERPL ELPH-MCNC: 2.3 G/DL — LOW (ref 3.3–5)
ALP SERPL-CCNC: 215 U/L — HIGH (ref 40–120)
ALT FLD-CCNC: <5 U/L RC — LOW (ref 10–45)
ANION GAP SERPL CALC-SCNC: 16 MMOL/L — SIGNIFICANT CHANGE UP (ref 5–17)
APTT BLD: 31.6 SEC — SIGNIFICANT CHANGE UP (ref 27.5–37.4)
AST SERPL-CCNC: 25 U/L — SIGNIFICANT CHANGE UP (ref 10–40)
BASE EXCESS BLDA CALC-SCNC: 2.1 MMOL/L — HIGH (ref -2–2)
BILIRUB SERPL-MCNC: 0.3 MG/DL — SIGNIFICANT CHANGE UP (ref 0.2–1.2)
BUN SERPL-MCNC: 25 MG/DL — HIGH (ref 7–23)
CALCIUM SERPL-MCNC: 8.4 MG/DL — SIGNIFICANT CHANGE UP (ref 8.4–10.5)
CHLORIDE SERPL-SCNC: 95 MMOL/L — LOW (ref 96–108)
CO2 BLDA-SCNC: 27 MMOL/L — SIGNIFICANT CHANGE UP (ref 22–30)
CO2 SERPL-SCNC: 25 MMOL/L — SIGNIFICANT CHANGE UP (ref 22–31)
CREAT SERPL-MCNC: 1.8 MG/DL — HIGH (ref 0.5–1.3)
GAS PNL BLDA: SIGNIFICANT CHANGE UP
GLUCOSE BLDC GLUCOMTR-MCNC: 104 MG/DL — HIGH (ref 70–99)
GLUCOSE BLDC GLUCOMTR-MCNC: 127 MG/DL — HIGH (ref 70–99)
GLUCOSE BLDC GLUCOMTR-MCNC: 134 MG/DL — HIGH (ref 70–99)
GLUCOSE BLDC GLUCOMTR-MCNC: 158 MG/DL — HIGH (ref 70–99)
GLUCOSE SERPL-MCNC: 169 MG/DL — HIGH (ref 70–99)
HCO3 BLDA-SCNC: 26 MMOL/L — SIGNIFICANT CHANGE UP (ref 21–29)
HCT VFR BLD CALC: 22.3 % — LOW (ref 34.5–45)
HGB BLD-MCNC: 7.5 G/DL — LOW (ref 11.5–15.5)
HOROWITZ INDEX BLDA+IHG-RTO: 40 — SIGNIFICANT CHANGE UP
INR BLD: 1.04 RATIO — SIGNIFICANT CHANGE UP (ref 0.88–1.16)
MAGNESIUM SERPL-MCNC: 2 MG/DL — SIGNIFICANT CHANGE UP (ref 1.6–2.6)
MCHC RBC-ENTMCNC: 32.6 PG — SIGNIFICANT CHANGE UP (ref 27–34)
MCHC RBC-ENTMCNC: 33.5 GM/DL — SIGNIFICANT CHANGE UP (ref 32–36)
MCV RBC AUTO: 97.2 FL — SIGNIFICANT CHANGE UP (ref 80–100)
PCO2 BLDA: 38 MMHG — SIGNIFICANT CHANGE UP (ref 32–46)
PH BLDA: 7.44 — SIGNIFICANT CHANGE UP (ref 7.35–7.45)
PHOSPHATE SERPL-MCNC: 3.6 MG/DL — SIGNIFICANT CHANGE UP (ref 2.5–4.5)
PLATELET # BLD AUTO: 327 K/UL — SIGNIFICANT CHANGE UP (ref 150–400)
PO2 BLDA: 150 MMHG — HIGH (ref 74–108)
POTASSIUM SERPL-MCNC: 4 MMOL/L — SIGNIFICANT CHANGE UP (ref 3.5–5.3)
POTASSIUM SERPL-SCNC: 4 MMOL/L — SIGNIFICANT CHANGE UP (ref 3.5–5.3)
PROT SERPL-MCNC: 6.8 G/DL — SIGNIFICANT CHANGE UP (ref 6–8.3)
PROTHROM AB SERPL-ACNC: 11.3 SEC — SIGNIFICANT CHANGE UP (ref 9.8–12.7)
RBC # BLD: 2.3 M/UL — LOW (ref 3.8–5.2)
RBC # FLD: 15.8 % — HIGH (ref 10.3–14.5)
SAO2 % BLDA: 100 % — HIGH (ref 92–96)
SODIUM SERPL-SCNC: 136 MMOL/L — SIGNIFICANT CHANGE UP (ref 135–145)
WBC # BLD: 5.9 K/UL — SIGNIFICANT CHANGE UP (ref 3.8–10.5)
WBC # FLD AUTO: 5.9 K/UL — SIGNIFICANT CHANGE UP (ref 3.8–10.5)

## 2018-03-13 PROCEDURE — 99231 SBSQ HOSP IP/OBS SF/LOW 25: CPT | Mod: GC

## 2018-03-13 PROCEDURE — 99233 SBSQ HOSP IP/OBS HIGH 50: CPT | Mod: GC

## 2018-03-13 PROCEDURE — 99232 SBSQ HOSP IP/OBS MODERATE 35: CPT

## 2018-03-13 PROCEDURE — 99291 CRITICAL CARE FIRST HOUR: CPT

## 2018-03-13 RX ORDER — ERYTHROPOIETIN 10000 [IU]/ML
10000 INJECTION, SOLUTION INTRAVENOUS; SUBCUTANEOUS ONCE
Qty: 0 | Refills: 0 | Status: COMPLETED | OUTPATIENT
Start: 2018-03-13 | End: 2018-03-13

## 2018-03-13 RX ORDER — ERYTHROPOIETIN 10000 [IU]/ML
10000 INJECTION, SOLUTION INTRAVENOUS; SUBCUTANEOUS
Qty: 0 | Refills: 0 | Status: DISCONTINUED | OUTPATIENT
Start: 2018-03-13 | End: 2018-03-13

## 2018-03-13 RX ORDER — ERYTHROPOIETIN 10000 [IU]/ML
10000 INJECTION, SOLUTION INTRAVENOUS; SUBCUTANEOUS
Qty: 0 | Refills: 0 | Status: DISCONTINUED | OUTPATIENT
Start: 2018-03-13 | End: 2018-03-28

## 2018-03-13 RX ORDER — CEFAZOLIN SODIUM 1 G
2000 VIAL (EA) INJECTION ONCE
Qty: 0 | Refills: 0 | Status: COMPLETED | OUTPATIENT
Start: 2018-03-13 | End: 2018-03-13

## 2018-03-13 RX ORDER — CEFAZOLIN SODIUM 1 G
2000 VIAL (EA) INJECTION
Qty: 0 | Refills: 0 | Status: DISCONTINUED | OUTPATIENT
Start: 2018-03-13 | End: 2018-03-22

## 2018-03-13 RX ORDER — HUMAN INSULIN 100 [IU]/ML
8 INJECTION, SUSPENSION SUBCUTANEOUS
Qty: 0 | Refills: 0 | Status: DISCONTINUED | OUTPATIENT
Start: 2018-03-13 | End: 2018-03-16

## 2018-03-13 RX ORDER — CEFAZOLIN SODIUM 1 G
3000 VIAL (EA) INJECTION
Qty: 0 | Refills: 0 | Status: DISCONTINUED | OUTPATIENT
Start: 2018-03-13 | End: 2018-03-22

## 2018-03-13 RX ADMIN — Medication 100 MILLIGRAM(S): at 18:38

## 2018-03-13 RX ADMIN — Medication 650 MILLIGRAM(S): at 02:34

## 2018-03-13 RX ADMIN — SODIUM CHLORIDE 3 MILLILITER(S): 9 INJECTION INTRAMUSCULAR; INTRAVENOUS; SUBCUTANEOUS at 00:19

## 2018-03-13 RX ADMIN — PANTOPRAZOLE SODIUM 40 MILLIGRAM(S): 20 TABLET, DELAYED RELEASE ORAL at 11:04

## 2018-03-13 RX ADMIN — Medication 2: at 00:45

## 2018-03-13 RX ADMIN — HEPARIN SODIUM 5000 UNIT(S): 5000 INJECTION INTRAVENOUS; SUBCUTANEOUS at 06:04

## 2018-03-13 RX ADMIN — Medication 650 MILLIGRAM(S): at 01:34

## 2018-03-13 RX ADMIN — Medication 3 MILLILITER(S): at 05:33

## 2018-03-13 RX ADMIN — CALAMINE 8% AND ZINC OXIDE 8% 1 APPLICATION(S): 160 LOTION TOPICAL at 11:10

## 2018-03-13 RX ADMIN — Medication 3 MILLILITER(S): at 17:28

## 2018-03-13 RX ADMIN — PROPOFOL 8.98 MICROGRAM(S)/KG/MIN: 10 INJECTION, EMULSION INTRAVENOUS at 00:44

## 2018-03-13 RX ADMIN — NYSTATIN CREAM 1 APPLICATION(S): 100000 CREAM TOPICAL at 17:40

## 2018-03-13 RX ADMIN — HUMAN INSULIN 8 UNIT(S): 100 INJECTION, SUSPENSION SUBCUTANEOUS at 17:01

## 2018-03-13 RX ADMIN — NYSTATIN CREAM 1 APPLICATION(S): 100000 CREAM TOPICAL at 06:04

## 2018-03-13 RX ADMIN — Medication 3 MILLILITER(S): at 11:44

## 2018-03-13 RX ADMIN — Medication 81 MILLIGRAM(S): at 11:04

## 2018-03-13 RX ADMIN — PROPOFOL 8.98 MICROGRAM(S)/KG/MIN: 10 INJECTION, EMULSION INTRAVENOUS at 06:18

## 2018-03-13 RX ADMIN — SODIUM CHLORIDE 3 MILLILITER(S): 9 INJECTION INTRAMUSCULAR; INTRAVENOUS; SUBCUTANEOUS at 05:33

## 2018-03-13 RX ADMIN — PROPOFOL 8.98 MICROGRAM(S)/KG/MIN: 10 INJECTION, EMULSION INTRAVENOUS at 13:16

## 2018-03-13 RX ADMIN — HEPARIN SODIUM 5000 UNIT(S): 5000 INJECTION INTRAVENOUS; SUBCUTANEOUS at 22:37

## 2018-03-13 RX ADMIN — HEPARIN SODIUM 5000 UNIT(S): 5000 INJECTION INTRAVENOUS; SUBCUTANEOUS at 13:15

## 2018-03-13 RX ADMIN — Medication 3 MILLILITER(S): at 00:22

## 2018-03-13 RX ADMIN — SODIUM CHLORIDE 3 MILLILITER(S): 9 INJECTION INTRAMUSCULAR; INTRAVENOUS; SUBCUTANEOUS at 11:45

## 2018-03-13 RX ADMIN — SODIUM CHLORIDE 3 MILLILITER(S): 9 INJECTION INTRAMUSCULAR; INTRAVENOUS; SUBCUTANEOUS at 17:28

## 2018-03-13 RX ADMIN — PROPOFOL 8.98 MICROGRAM(S)/KG/MIN: 10 INJECTION, EMULSION INTRAVENOUS at 22:35

## 2018-03-13 NOTE — PROGRESS NOTE ADULT - ASSESSMENT
68 year old female with HTN, DMT2, Breast Cancer (diagnosed in Feb 2017), originally presented on 1/30 with fever found to have influenza subsequently went into PEA arrest x 2 with ROSC, complicated by bilateral pneumothoraces with pneumomediastinum s/p 3 chest tubes, and new ESRD. Patient has had 2 readmissions to the MICU for hypoxic respiratory failure likely 2/2 aspiration pneumonia with intubations and MSSA PNA.   GI consulted for peg tube placement.     Impression:   - Dysphagia likely 2/2 severe illness requiring management in the ICU  - Hypoxic respiratory failure, currently ventilated   - ESRD on HD    Plan:   - trend CBC, CMP, INR  - plan for peg tube placement, on Wednesday (peg tube will be placed after the tracheostomy is done)  - please ensure that peg tube procedure is done before the hemodialysis on Wed  - keep NPO after midnight on Tues  - plan discussed with  and daughter, discussed risks and benefits of the procedure  - supportive care as per primary team    GI team will continue to follow.  Thank you for the consult. 68 year old female with HTN, DMT2, Breast Cancer (diagnosed in Feb 2017), originally presented on 1/30 with fever found to have influenza subsequently went into PEA arrest x 2 with ROSC, complicated by bilateral pneumothoraces with pneumomediastinum s/p 3 chest tubes, and new ESRD. Patient has had 2 readmissions to the MICU for hypoxic respiratory failure likely 2/2 aspiration pneumonia with intubations and MSSA PNA.   GI consulted for peg tube placement.     Impression:   - Dysphagia likely 2/2 severe illness requiring management in the ICU  - Hypoxic respiratory failure, currently ventilated   - ESRD on HD    Plan:   - trend CBC, CMP, INR  - plan for peg tube placement, on Thursday 3/15 (peg tube will be placed after the tracheostomy is done which is scheduled for 3/14)  - please ensure that peg tube procedure is done before the hemodialysis on Thurs   - keep NPO after midnight on Wednesday   - plan discussed with  and daughter, discussed risks and benefits of the procedure  - supportive care as per primary team    GI team will continue to follow.  Thank you for the consult.

## 2018-03-13 NOTE — PROGRESS NOTE ADULT - SUBJECTIVE AND OBJECTIVE BOX
CHIEF COMPLAINT: Patient is a 68y old  Female who presents with a chief complaint of Fever, total body weakness (02 Feb 2018 13:44)    Interval Events: Straight cath for urinary retention overnight, no other significant events    REVIEW OF SYSTEMS:  Constitutional: [ ] negative [ ] fevers [ ] chills [ ] weight loss [ ] weight gain  HEENT: [ ] negative [ ] dry eyes [ ] eye irritation [ ] postnasal drip [ ] nasal congestion  CV: [ ] negative  [ ] chest pain [ ] orthopnea [ ] palpitations [ ] murmur  Resp: [ ] negative [ ] cough [ ] shortness of breath [ ] dyspnea [ ] wheezing [ ] sputum [ ] hemoptysis  GI: [ ] negative [ ] nausea [ ] vomiting [ ] diarrhea [ ] constipation [ ] abd pain [ ] dysphagia   : [ ] negative [ ] dysuria [ ] nocturia [ ] hematuria [ ] increased urinary frequency  Musculoskeletal: [ ] negative [ ] back pain [ ] myalgias [ ] arthralgias [ ] fracture  Skin: [ ] negative [ ] rash [ ] itch  Neurological: [ ] negative [ ] headache [ ] dizziness [ ] syncope [ ] weakness [ ] numbness  Psychiatric: [ ] negative [ ] anxiety [ ] depression  Endocrine: [ ] negative [ ] diabetes [ ] thyroid problem  Hematologic/Lymphatic: [ ] negative [ ] anemia [ ] bleeding problem  Allergic/Immunologic: [ ] negative [ ] itchy eyes [ ] nasal discharge [ ] hives [ ] angioedema  [ ] All other systems negative  [x] Unable to assess ROS because pt is intubated and sedated    OBJECTIVE:  ICU Vital Signs Last 24 Hrs  T(C): 36.9 (13 Mar 2018 04:00), Max: 37.9 (13 Mar 2018 01:00)  T(F): 98.5 (13 Mar 2018 04:00), Max: 100.3 (13 Mar 2018 01:00)  HR: 82 (13 Mar 2018 07:00) (74 - 102)  BP: 106/51 (13 Mar 2018 07:00) (97/49 - 160/70)  BP(mean): 73 (13 Mar 2018 07:00) (70 - 102)  ABP: --  ABP(mean): --  RR: 15 (13 Mar 2018 07:00) (15 - 26)  SpO2: 100% (13 Mar 2018 07:00) (100% - 100%)    Mode: AC/ CMV (Assist Control/ Continuous Mandatory Ventilation), RR (machine): 15, TV (machine): 400, FiO2: 40, PEEP: 5, ITime: 0.9, MAP: 9, PIP: 29    03-12 @ 07:01  -  03-13 @ 07:00  --------------------------------------------------------  IN: 501.4 mL / OUT: 800 mL / NET: -298.6 mL      CAPILLARY BLOOD GLUCOSE  142 (12 Mar 2018 11:00)      POCT Blood Glucose.: 104 mg/dL (13 Mar 2018 06:16)      PHYSICAL EXAM:  General: intubated/sedated, not responsive to verbal or physical stimuli  HEENT: PERRL  Neck: supple, no JVD  Respiratory: clear to auscultation   Cardiovascular: S1/S2, no murmurs/ rubs, no   Abdomen: soft, non-rigid, non-distended  Extremities: no pitting edema  Skin: normal for race  Neurological: sedated  Psychiatry: cannot assess at this time    LINES: PIV, L subclav mediport, RI tunneled catheter     HOSPITAL MEDICATIONS:  Standing Meds:  ALBUTerol/ipratropium for Nebulization 3 milliLiter(s) Nebulizer every 6 hours  aspirin  chewable 81 milliGRAM(s) Oral daily  calamine Lotion 1 Application(s) Topical daily  ceFAZolin   IVPB 2000 milliGRAM(s) IV Intermittent <User Schedule>  ceFAZolin   IVPB 3000 milliGRAM(s) IV Intermittent <User Schedule>  dextrose 5%. 1000 milliLiter(s) IV Continuous <Continuous>  dextrose 5%. 1000 milliLiter(s) IV Continuous <Continuous>  dextrose 5%. 1000 milliLiter(s) IV Continuous <Continuous>  dextrose 50% Injectable 12.5 Gram(s) IV Push once  dextrose 50% Injectable 25 Gram(s) IV Push once  dextrose 50% Injectable 25 Gram(s) IV Push once  epoetin odalis Injectable 60053 Unit(s) IV Push <User Schedule>  heparin  Injectable 5000 Unit(s) SubCutaneous every 8 hours  insulin lispro (HumaLOG) corrective regimen sliding scale   SubCutaneous every 6 hours  nystatin Powder 1 Application(s) Topical two times a day  pantoprazole  Injectable 40 milliGRAM(s) IV Push daily  propofol Infusion 20 MICROgram(s)/kG/Min IV Continuous <Continuous>  sodium chloride 3%  Inhalation 3 milliLiter(s) Inhalation four times a day      PRN Meds:  acetaminophen    Suspension. 650 milliGRAM(s) Oral every 6 hours PRN  chlorhexidine 0.12% Liquid 15 milliLiter(s) Swish and Spit every 4 hours PRN  dextrose Gel 1 Dose(s) Oral once PRN  glucagon  Injectable 1 milliGRAM(s) IntraMuscular once PRN  hydrocortisone 1% Ointment 1 Application(s) Topical every 8 hours PRN  ondansetron Injectable 4 milliGRAM(s) IV Push every 6 hours PRN      LABS:                        7.5    5.9   )-----------( 327      ( 13 Mar 2018 00:42 )             22.3     Hgb Trend: 7.5<--, 7.6<--, 7.0<--, 7.4<--, 8.2<--  03-13    136  |  95<L>  |  25<H>  ----------------------------<  169<H>  4.0   |  25  |  1.80<H>    Ca    8.4      13 Mar 2018 00:42  Phos  3.6     03-13  Mg     2.0     03-13    TPro  6.8  /  Alb  2.3<L>  /  TBili  0.3  /  DBili  x   /  AST  25  /  ALT  <5<L>  /  AlkPhos  215<H>  03-13    Creatinine Trend: 1.80<--, 1.40<--, 3.47<--, 3.38<--, 3.17<--, 2.58<--  PT/INR - ( 13 Mar 2018 00:42 )   PT: 11.3 sec;   INR: 1.04 ratio         PTT - ( 13 Mar 2018 00:42 )  PTT:31.6 sec    Arterial Blood Gas:  03-13 @ 00:38  7.49/36/111/27/99/3.9  ABG lactate: --  Arterial Blood Gas:  03-11 @ 23:52  7.47/38/145/28/100/4.1  ABG lactate: --  Arterial Blood Gas:  03-11 @ 18:18  7.44/42/63/28/96/4.5  ABG lactate: --  Arterial Blood Gas:  03-11 @ 14:45  7.30/60/106/29/99/2.5  ABG lactate: --        MICROBIOLOGY:     RADIOLOGY:  [ ] Reviewed and interpreted by me    EKG:    Assessment/Plan:  68F PMH HTN, DMT2, Breast Cancer (diagnosed in Feb 2017) currently on trastuzumab (last dose Jan 20th), originally presented 1/30 with fever found to have influenza subsequently went into PEA arrest x 2 with ROSC, complicated by bilateral pneumothoraces with pneumomediastinum s/p 3 chest tubes, and new ESRD, readmitted to the MICU for hypoxic respiratory failure likely 2/2 aspiration pneumonia with MSSA PNA and bacteremia on 2/21, extubated on 3/1 with stable respiratory status, now readmitted to the MICU for hypoxic respiratory failure likely secondary to a recurrent aspiration event.       # Neuro:  - currently patient intubated and sedated  - on propofol gtt, titrate to RASS 0 to -2  - Known watershed ischemia of the occiput from low flow state during admission 2/2 flu    # CV:  - currently off pressors  - MAPs > 65  - can start levo if needed while patient on propofol  - continue carvedilol 3.125 q 12, hold for MAP < 65      # Pulm:  - reintubated for hypoxic respiratory failure (third intubation)  - plan trach today or tomorrow.  - Mild alkalosis - decreased RR, recheck blood gas this morning  - if patient with marked secretions- will start 3% saline with duonebs  - no signs or symptoms of PNA at this time      # Renal:  - on HD 3x/ week- M/W/F  - monitor for urinary retention, consider willard  - s/p permacath placement by IR    # ID:   -concern for reccurent aspiration event  - if patient dale white count or fever- will start zosyn  - recent MSSA bacteremia with clearance of blood cultures- c/w ancef until March 24th (to be given after HD)  - echo after negative blood cultures shows no signs of vegetation    # GI:  - PEG tube tomorrow  - GI ppx with protonix 40mg daily    # Endocrine:  - hold NPH while tube feeds on hold  - c/w sliding scale insulin    # Heme:  - Will continue to trend H/H  - Continue epo for chronic ESRD-related anemia  - Currently holding Herceptin      # DVT ppx:  - HSC 5000 units Q8 hours    Jonathan Weil, PGY1  [phone]

## 2018-03-13 NOTE — PROGRESS NOTE ADULT - ATTENDING COMMENTS
68 year old woman with respiratory failure requiring intubation    1.Acute hypoxemic respiratory failure due to recurrent aspiration pneumonitis.    2.Family has agreed for tracheostomy and PEG. planned for later this week  3. MSSA bacteremia. Cleared. Continue 4 week course of ANCEF.  4. Renal failure requiring dialysis. has tunneled catheter.  5. OT/PT to see    CC time 35 min

## 2018-03-13 NOTE — PROGRESS NOTE ADULT - ASSESSMENT
69 y/o F with T2DM uncontrolled with neuropathy and ESRD now on HD on insulin, osteoporosis, HTN, here with acute respiratory failure 2/2 influenza s/p CVA and PEA arrest x 2, and MSSA bacteremia on month long tx for aspiration pna, re-intubated on 3.11.18 for hypoxia/respiratory distress awaiting PEG and trach.

## 2018-03-13 NOTE — PROGRESS NOTE ADULT - SUBJECTIVE AND OBJECTIVE BOX
[facility] Division of Kidney Diseases & Hypertension  FOLLOW UP NOTE  [phone]--------------------------------------------------------------------------------  Chief Complaint:Cardiac arrest      24 hour events/subjective:    No acute events noted  Had HD yesterday on 3/12 and tolerated it well    PAST HISTORY  --------------------------------------------------------------------------------  No significant changes to PMH, PSH, FHx, SHx, unless otherwise noted    ALLERGIES & MEDICATIONS  --------------------------------------------------------------------------------  Allergies    doxycycline (Rash)  isoniazid (Rash)  NIFEdipine (Urticaria; Hives)  vitamin E (Short breath; Urticaria; Hives)    Intolerances      Standing Inpatient Medications  ALBUTerol/ipratropium for Nebulization 3 milliLiter(s) Nebulizer every 6 hours  aspirin  chewable 81 milliGRAM(s) Oral daily  calamine Lotion 1 Application(s) Topical daily  ceFAZolin   IVPB 2000 milliGRAM(s) IV Intermittent <User Schedule>  ceFAZolin   IVPB 3000 milliGRAM(s) IV Intermittent <User Schedule>  dextrose 5%. 1000 milliLiter(s) IV Continuous <Continuous>  dextrose 5%. 1000 milliLiter(s) IV Continuous <Continuous>  dextrose 5%. 1000 milliLiter(s) IV Continuous <Continuous>  dextrose 50% Injectable 12.5 Gram(s) IV Push once  dextrose 50% Injectable 25 Gram(s) IV Push once  dextrose 50% Injectable 25 Gram(s) IV Push once  epoetin odalis Injectable 35053 Unit(s) IV Push <User Schedule>  heparin  Injectable 5000 Unit(s) SubCutaneous every 8 hours  insulin lispro (HumaLOG) corrective regimen sliding scale   SubCutaneous every 6 hours  nystatin Powder 1 Application(s) Topical two times a day  pantoprazole  Injectable 40 milliGRAM(s) IV Push daily  propofol Infusion 20 MICROgram(s)/kG/Min IV Continuous <Continuous>  sodium chloride 3%  Inhalation 3 milliLiter(s) Inhalation four times a day    PRN Inpatient Medications  acetaminophen    Suspension. 650 milliGRAM(s) Oral every 6 hours PRN  chlorhexidine 0.12% Liquid 15 milliLiter(s) Swish and Spit every 4 hours PRN  dextrose Gel 1 Dose(s) Oral once PRN  glucagon  Injectable 1 milliGRAM(s) IntraMuscular once PRN  hydrocortisone 1% Ointment 1 Application(s) Topical every 8 hours PRN  ondansetron Injectable 4 milliGRAM(s) IV Push every 6 hours PRN      REVIEW OF SYSTEMS  --------------------------------------------------------------------------------  Unable to obtain.     VITALS/PHYSICAL EXAM  --------------------------------------------------------------------------------  T(C): 36.9 (03-13-18 @ 04:00), Max: 37.9 (03-13-18 @ 01:00)  HR: 82 (03-13-18 @ 07:00) (74 - 102)  BP: 106/51 (03-13-18 @ 07:00) (97/49 - 160/70)  RR: 15 (03-13-18 @ 07:00) (15 - 26)  SpO2: 100% (03-13-18 @ 07:00) (100% - 100%)  Wt(kg): --    Weight (kg): 73 (03-11-18 @ 13:47)      03-12-18 @ 07:01  -  03-13-18 @ 07:00  --------------------------------------------------------  IN: 501.4 mL / OUT: 800 mL / NET: -298.6 mL      Physical Exam:  	  Gen: Intubated, on mechanical ventilator   	HEENT: PERRL, supple neck, no jvp  	Pulm: CTA B/L  	CV: RRR, S1S2; no rub  	Back: No spinal or CVA tenderness; no sacral edema  	Abd: +BS, soft, nontender/nondistended  	: No suprapubic tenderness  	Ext: no edema  	Neuro: Sedated  	Skin: Warm, without rashes  	Vascular access: tunneled HD catheter present.       LABS/STUDIES  --------------------------------------------------------------------------------              7.5    5.9   >-----------<  327      [03-13-18 @ 00:42]              22.3     136  |  95  |  25  ----------------------------<  169      [03-13-18 @ 00:42]  4.0   |  25  |  1.80        Ca     8.4     [03-13-18 @ 00:42]      Mg     2.0     [03-13-18 @ 00:42]      Phos  3.6     [03-13-18 @ 00:42]    TPro  6.8  /  Alb  2.3  /  TBili  0.3  /  DBili  x   /  AST  25  /  ALT  <5  /  AlkPhos  215  [03-13-18 @ 00:42]    PT/INR: PT 11.3 , INR 1.04       [03-13-18 @ 00:42]  PTT: 31.6       [03-13-18 @ 00:42]      Creatinine Trend:  SCr 1.80 [03-13 @ 00:42]  SCr 1.40 [03-12 @ 17:54]  SCr 3.47 [03-11 @ 23:57]  SCr 3.38 [03-11 @ 14:53]  SCr 3.17 [03-11 @ 11:21]        Iron 32, TIBC 248, %sat 13      [03-07-18 @ 14:34]  Ferritin 174      [03-07-18 @ 14:34]

## 2018-03-13 NOTE — PROGRESS NOTE ADULT - PROBLEM SELECTOR PLAN 1
from [facility] in setting of cardiac arrest. Pt continues to be oligo-anuric, requiring dialysis. No evidence of renal recovery at present.  Patient s/p tunneled HD catheter placement  by IR . Last HD was done on 3/12. Labs reviewed from today; no plan for HD today. Monitor BMP daily.

## 2018-03-13 NOTE — PROGRESS NOTE ADULT - SUBJECTIVE AND OBJECTIVE BOX
CC: F/U for MSSA    Saw/spoke to patient. No fevers, no chills. Patient remains intubated. No new complaints. Overall unchanged. Possible for trach today.    Allergies  doxycycline (Rash)  isoniazid (Rash)  NIFEdipine (Urticaria; Hives)  vitamin E (Short breath; Urticaria; Hives)    ANTIMICROBIALS:  ceFAZolin   IVPB 2000 <User Schedule>  ceFAZolin   IVPB 3000 <User Schedule>    PE:    Vital Signs Last 24 Hrs  T(C): 36.2 (13 Mar 2018 08:00), Max: 37.9 (13 Mar 2018 01:00)  T(F): 97.2 (13 Mar 2018 08:00), Max: 100.3 (13 Mar 2018 01:00)  HR: 80 (13 Mar 2018 08:39) (74 - 102)  BP: 166/69 (13 Mar 2018 08:00) (97/49 - 166/69)  BP(mean): 99 (13 Mar 2018 08:00) (70 - 101)  RR: 15 (13 Mar 2018 08:00) (15 - 26)  SpO2: 100% (13 Mar 2018 08:39) (100% - 100%)    Gen: AOx0-1, sedated  CV: S1+S2 normal, no murmurs, nontachycardic  Resp: Intubated, no crackles, no wheeze  Abd: Soft, nontender, +BS, NGT  Ext: No LE edema, no wounds    LABS:                        7.5    5.9   )-----------( 327      ( 13 Mar 2018 00:42 )             22.3     03-13    136  |  95<L>  |  25<H>  ----------------------------<  169<H>  4.0   |  25  |  1.80<H>    Ca    8.4      13 Mar 2018 00:42  Phos  3.6     03-13  Mg     2.0     03-13    TPro  6.8  /  Alb  2.3<L>  /  TBili  0.3  /  DBili  x   /  AST  25  /  ALT  <5<L>  /  AlkPhos  215<H>  03-13    MICROBIOLOGY:    .Blood Blood-Venous  02-27-18   No growth at 5 days.     .Blood Blood-Peripheral  02-24-18   No growth at 5 days.     .Sputum Sputum trap  02-22-18   Numerous Staphylococcus aureus (MSSA)    .Blood Blood  02-22-18   Growth in anaerobic bottle: Staphylococcus aureus  (MSSA)    .Blood Blood  02-22-18   No growth at 5 days.      .Blood Blood-Peripheral  02-19-18   No growth at 5 days.    RADIOLOGY:    No new available

## 2018-03-13 NOTE — PROGRESS NOTE ADULT - ASSESSMENT
68 F PMH HTN, DM2, Breast Cancer (diagnosed in Feb 2017) currently on trastuzumab (last dose Jan 20th), originally presented 1/30 with fever found to have influenza subsequently went into PEA arrest x 2 , complicated by bilateral pneumothoraces with pneumomediastinum s/p 3 chest tubes, and also new ESRD requiring almost daily HD, readmitted to the MICU for hypoxic respiratory failure likely 2/2 aspiration pneumonia with MSSA bacteremia and pneumonia, patient re-intubated, likely for trach placement. BCX clear on 2/24. Fever x1 over weekend, not persistent. Monitor. Patient unchanged today, ? trach today. No signs breakthrough infection. Overall, MSSA bacteremia, pna, respiratory failure, intubated.  - Cefazolin post HD (2g, 2g, 3g) through 3/22  - If worsening fevers, cough, SOB, leukocytosis--likely broaden to cover aspiration pneumonia  - Aspiration precautions    Yossi Durant MD  Pager [phone]  After 5pm and on weekends call [phone]

## 2018-03-13 NOTE — PROGRESS NOTE ADULT - SUBJECTIVE AND OBJECTIVE BOX
Chief Complaint/Follow-up on: T2DM  Subjective: Pt still intubated but opens her eyes and shakes her head at times.    MEDICATIONS  (STANDING):  ALBUTerol/ipratropium for Nebulization 3 milliLiter(s) Nebulizer every 6 hours  aspirin  chewable 81 milliGRAM(s) Oral daily  calamine Lotion 1 Application(s) Topical daily  ceFAZolin   IVPB 2000 milliGRAM(s) IV Intermittent <User Schedule>  ceFAZolin   IVPB 3000 milliGRAM(s) IV Intermittent <User Schedule>  dextrose 5%. 1000 milliLiter(s) (50 mL/Hr) IV Continuous <Continuous>  dextrose 5%. 1000 milliLiter(s) (50 mL/Hr) IV Continuous <Continuous>  dextrose 5%. 1000 milliLiter(s) (50 mL/Hr) IV Continuous <Continuous>  dextrose 50% Injectable 12.5 Gram(s) IV Push once  dextrose 50% Injectable 25 Gram(s) IV Push once  dextrose 50% Injectable 25 Gram(s) IV Push once  epoetin odalis Injectable 65639 Unit(s) SubCutaneous <User Schedule>  heparin  Injectable 5000 Unit(s) SubCutaneous every 8 hours  insulin lispro (HumaLOG) corrective regimen sliding scale   SubCutaneous every 6 hours  insulin NPH human recombinant 8 Unit(s) SubCutaneous <User Schedule>  nystatin Powder 1 Application(s) Topical two times a day  pantoprazole  Injectable 40 milliGRAM(s) IV Push daily  propofol Infusion 20 MICROgram(s)/kG/Min (8.976 mL/Hr) IV Continuous <Continuous>  sodium chloride 3%  Inhalation 3 milliLiter(s) Inhalation four times a day    MEDICATIONS  (PRN):  acetaminophen    Suspension. 650 milliGRAM(s) Oral every 6 hours PRN Mild Pain (1 - 3)  chlorhexidine 0.12% Liquid 15 milliLiter(s) Swish and Spit every 4 hours PRN mouth hygiene  dextrose Gel 1 Dose(s) Oral once PRN Blood Glucose LESS THAN 70 milliGRAM(s)/deciliter  glucagon  Injectable 1 milliGRAM(s) IntraMuscular once PRN Glucose LESS THAN 70 milligrams/deciliter  hydrocortisone 1% Ointment 1 Application(s) Topical every 8 hours PRN Rash and/or Itching  ondansetron Injectable 4 milliGRAM(s) IV Push every 6 hours PRN Nausea and/or Vomiting      PHYSICAL EXAM:  VITALS: T(C): 37.2 (03-13-18 @ 16:00)  T(F): 99 (03-13-18 @ 16:00), Max: 100.3 (03-13-18 @ 01:00)  HR: 92 (03-13-18 @ 18:00) (78 - 102)  BP: 128/60 (03-13-18 @ 18:00) (97/49 - 173/73)  RR:  (15 - 29)  SpO2:  (100% - 100%)  Wt(kg): --  GENERAL: +intubated  HEENT:  Atraumatic, Normocephalic, moist mucous membranes  RESPIRATORY: Clear to auscultation bilaterally; No rales, rhonchi, wheezing, or rubs  CARDIOVASCULAR: Regular rate and rhythm; No murmurs  GI: Soft, nontender, non distended, normal bowel sounds      POCT Blood Glucose.: 127 mg/dL (03-13-18 @ 16:52)  POCT Blood Glucose.: 134 mg/dL (03-13-18 @ 11:02)  POCT Blood Glucose.: 104 mg/dL (03-13-18 @ 06:16)  POCT Blood Glucose.: 158 mg/dL (03-13-18 @ 00:43)  POCT Blood Glucose.: 113 mg/dL (03-12-18 @ 17:05)  POCT Blood Glucose.: 142 mg/dL (03-12-18 @ 10:49)  POCT Blood Glucose.: 150 mg/dL (03-12-18 @ 06:13)  POCT Blood Glucose.: 109 mg/dL (03-11-18 @ 23:55)  POCT Blood Glucose.: 159 mg/dL (03-11-18 @ 17:02)  POCT Blood Glucose.: 177 mg/dL (03-11-18 @ 13:23)  POCT Blood Glucose.: 182 mg/dL (03-11-18 @ 12:22)  POCT Blood Glucose.: 107 mg/dL (03-11-18 @ 06:09)  POCT Blood Glucose.: 121 mg/dL (03-10-18 @ 23:24)    03-13    136  |  95<L>  |  25<H>  ----------------------------<  169<H>  4.0   |  25  |  1.80<H>    EGFR if : 32<L>  EGFR if non : 28<L>    Ca    8.4      03-13  Mg     2.0     03-13  Phos  3.6     03-13    TPro  6.8  /  Alb  2.3<L>  /  TBili  0.3  /  DBili  x   /  AST  25  /  ALT  <5<L>  /  AlkPhos  215<H>  03-13      Hemoglobin A1C, Whole Blood: 8.6 % <H> [4.0 - 5.6] (01-31-18 @ 07:15)  Hemoglobin A1C, Whole Blood: 8.7 % <H> [4.0 - 5.6] (01-31-18 @ 05:51)

## 2018-03-13 NOTE — PROGRESS NOTE ADULT - SUBJECTIVE AND OBJECTIVE BOX
Chief Complaint:  Patient is a 68y old  Female who presents with a chief complaint of Fever, total body weakness (2018 13:44)      Interval Events:     Allergies:  doxycycline (Rash)  isoniazid (Rash)  NIFEdipine (Urticaria; Hives)  vitamin E (Short breath; Urticaria; Hives)      Hospital Medications:  acetaminophen    Suspension. 650 milliGRAM(s) Oral every 6 hours PRN  ALBUTerol/ipratropium for Nebulization 3 milliLiter(s) Nebulizer every 6 hours  aspirin  chewable 81 milliGRAM(s) Oral daily  calamine Lotion 1 Application(s) Topical daily  ceFAZolin   IVPB 2000 milliGRAM(s) IV Intermittent <User Schedule>  ceFAZolin   IVPB 3000 milliGRAM(s) IV Intermittent <User Schedule>  chlorhexidine 0.12% Liquid 15 milliLiter(s) Swish and Spit every 4 hours PRN  dextrose 5%. 1000 milliLiter(s) IV Continuous <Continuous>  dextrose 5%. 1000 milliLiter(s) IV Continuous <Continuous>  dextrose 5%. 1000 milliLiter(s) IV Continuous <Continuous>  dextrose 50% Injectable 12.5 Gram(s) IV Push once  dextrose 50% Injectable 25 Gram(s) IV Push once  dextrose 50% Injectable 25 Gram(s) IV Push once  dextrose Gel 1 Dose(s) Oral once PRN  epoetin odalis Injectable 34093 Unit(s) SubCutaneous <User Schedule>  glucagon  Injectable 1 milliGRAM(s) IntraMuscular once PRN  heparin  Injectable 5000 Unit(s) SubCutaneous every 8 hours  hydrocortisone 1% Ointment 1 Application(s) Topical every 8 hours PRN  insulin lispro (HumaLOG) corrective regimen sliding scale   SubCutaneous every 6 hours  nystatin Powder 1 Application(s) Topical two times a day  ondansetron Injectable 4 milliGRAM(s) IV Push every 6 hours PRN  pantoprazole  Injectable 40 milliGRAM(s) IV Push daily  propofol Infusion 20 MICROgram(s)/kG/Min IV Continuous <Continuous>  sodium chloride 3%  Inhalation 3 milliLiter(s) Inhalation four times a day      PMHX/PSHX:  Diabetes  Breast CA  H/O mastectomy, bilateral  No significant past surgical history      Family history:  No pertinent family history in first degree relatives      ROS:     unable to assess       PHYSICAL EXAM:     GENERAL:  Appears stated age, well-groomed, sedated   HEENT:  NC/AT,  conjunctivae clear  CHEST:  ETT in place, NG present, vent sounds+  HEART:  Regular rhythm, S1, S2  ABDOMEN:  Soft, non-tender, non-distended, normoactive bowel sounds  EXTREMITIES:  no edema  SKIN:  No rash  NEURO:  sedated     Vital Signs:  Vital Signs Last 24 Hrs  T(C): 36.2 (13 Mar 2018 08:00), Max: 37.9 (13 Mar 2018 01:00)  T(F): 97.2 (13 Mar 2018 08:00), Max: 100.3 (13 Mar 2018 01:00)  HR: 80 (13 Mar 2018 08:39) (74 - 102)  BP: 166/69 (13 Mar 2018 08:00) (97/49 - 166/69)  BP(mean): 99 (13 Mar 2018 08:00) (70 - 101)  RR: 15 (13 Mar 2018 08:00) (15 - 26)  SpO2: 100% (13 Mar 2018 08:39) (100% - 100%)  Daily     Daily Weight in k.3 (13 Mar 2018 04:00)    LABS:                        7.5    5.9   )-----------( 327      ( 13 Mar 2018 00:42 )             22.3     03-13    136  |  95<L>  |  25<H>  ----------------------------<  169<H>  4.0   |  25  |  1.80<H>    Ca    8.4      13 Mar 2018 00:42  Phos  3.6     03-13  Mg     2.0     03-13    TPro  6.8  /  Alb  2.3<L>  /  TBili  0.3  /  DBili  x   /  AST  25  /  ALT  <5<L>  /  AlkPhos  215<H>  03-13    LIVER FUNCTIONS - ( 13 Mar 2018 00:42 )  Alb: 2.3 g/dL / Pro: 6.8 g/dL / ALK PHOS: 215 U/L / ALT: <5 U/L RC / AST: 25 U/L / GGT: x           PT/INR - ( 13 Mar 2018 00:42 )   PT: 11.3 sec;   INR: 1.04 ratio         PTT - ( 13 Mar 2018 00:42 )  PTT:31.6 sec        Imaging: Chief Complaint:  Patient is a 68y old  Female who presents with a chief complaint of Fever, total body weakness (2018 13:44)      Interval Events:   Patient seen and examined.     Allergies:  doxycycline (Rash)  isoniazid (Rash)  NIFEdipine (Urticaria; Hives)  vitamin E (Short breath; Urticaria; Hives)      Hospital Medications:  acetaminophen    Suspension. 650 milliGRAM(s) Oral every 6 hours PRN  ALBUTerol/ipratropium for Nebulization 3 milliLiter(s) Nebulizer every 6 hours  aspirin  chewable 81 milliGRAM(s) Oral daily  calamine Lotion 1 Application(s) Topical daily  ceFAZolin   IVPB 2000 milliGRAM(s) IV Intermittent <User Schedule>  ceFAZolin   IVPB 3000 milliGRAM(s) IV Intermittent <User Schedule>  chlorhexidine 0.12% Liquid 15 milliLiter(s) Swish and Spit every 4 hours PRN  dextrose 5%. 1000 milliLiter(s) IV Continuous <Continuous>  dextrose 5%. 1000 milliLiter(s) IV Continuous <Continuous>  dextrose 5%. 1000 milliLiter(s) IV Continuous <Continuous>  dextrose 50% Injectable 12.5 Gram(s) IV Push once  dextrose 50% Injectable 25 Gram(s) IV Push once  dextrose 50% Injectable 25 Gram(s) IV Push once  dextrose Gel 1 Dose(s) Oral once PRN  epoetin odalis Injectable 14441 Unit(s) SubCutaneous <User Schedule>  glucagon  Injectable 1 milliGRAM(s) IntraMuscular once PRN  heparin  Injectable 5000 Unit(s) SubCutaneous every 8 hours  hydrocortisone 1% Ointment 1 Application(s) Topical every 8 hours PRN  insulin lispro (HumaLOG) corrective regimen sliding scale   SubCutaneous every 6 hours  nystatin Powder 1 Application(s) Topical two times a day  ondansetron Injectable 4 milliGRAM(s) IV Push every 6 hours PRN  pantoprazole  Injectable 40 milliGRAM(s) IV Push daily  propofol Infusion 20 MICROgram(s)/kG/Min IV Continuous <Continuous>  sodium chloride 3%  Inhalation 3 milliLiter(s) Inhalation four times a day      PMHX/PSHX:  Diabetes  Breast CA  H/O mastectomy, bilateral  No significant past surgical history      Family history:  No pertinent family history in first degree relatives      ROS:     unable to assess       PHYSICAL EXAM:     GENERAL:  Appears stated age, well-groomed, sedated   HEENT:  NC/AT,  conjunctivae clear  CHEST:  ETT in place, NG present, vent sounds+  HEART:  Regular rhythm, S1, S2  ABDOMEN:  Soft, non-tender, non-distended, normoactive bowel sounds  EXTREMITIES:  no edema  SKIN:  No rash  NEURO:  sedated     Vital Signs:  Vital Signs Last 24 Hrs  T(C): 36.2 (13 Mar 2018 08:00), Max: 37.9 (13 Mar 2018 01:00)  T(F): 97.2 (13 Mar 2018 08:00), Max: 100.3 (13 Mar 2018 01:00)  HR: 80 (13 Mar 2018 08:39) (74 - 102)  BP: 166/69 (13 Mar 2018 08:00) (97/49 - 166/69)  BP(mean): 99 (13 Mar 2018 08:00) (70 - 101)  RR: 15 (13 Mar 2018 08:00) (15 - 26)  SpO2: 100% (13 Mar 2018 08:39) (100% - 100%)  Daily     Daily Weight in k.3 (13 Mar 2018 04:00)    LABS:                        7.5    5.9   )-----------( 327      ( 13 Mar 2018 00:42 )             22.3     03-13    136  |  95<L>  |  25<H>  ----------------------------<  169<H>  4.0   |  25  |  1.80<H>    Ca    8.4      13 Mar 2018 00:42  Phos  3.6     03-13  Mg     2.0     03-13    TPro  6.8  /  Alb  2.3<L>  /  TBili  0.3  /  DBili  x   /  AST  25  /  ALT  <5<L>  /  AlkPhos  215<H>  03-13    LIVER FUNCTIONS - ( 13 Mar 2018 00:42 )  Alb: 2.3 g/dL / Pro: 6.8 g/dL / ALK PHOS: 215 U/L / ALT: <5 U/L RC / AST: 25 U/L / GGT: x           PT/INR - ( 13 Mar 2018 00:42 )   PT: 11.3 sec;   INR: 1.04 ratio         PTT - ( 13 Mar 2018 00:42 )  PTT:31.6 sec        Imaging:    < from: US Abdomen Upper Quadrant Right (02.15.18 @ 13:11) >  FINDINGS:    Liver: Within normal limits.    Bile ducts: Normal caliber. Common bile duct measures 3 mm.     Gallbladder: Mild sludge. Negative sonographic Arriola sign. No   gallstones.        Pancreas: Visualized portions are within normal limits.    Right kidney: 10 cm. No hydronephrosis.        Ascites: None.    IVC: Visualized portions are within normal limits.    IMPRESSION:     Mild gallbladder sludge.        < end of copied text >

## 2018-03-13 NOTE — CHART NOTE - NSCHARTNOTEFT_GEN_A_CORE
Follow up.    66 yo female with PMH of HTN, T2DM, Breast CA with complicated medical course including MICU admission x2, acute respiratory failure, and new ESRD requiring HD. Last HD 3/12.  MICU readm 3/11 for aspiration PNA. Plan for trach and PEG.    Source: Patient [ ]    Family [ ]     other [x ] chart    Diet : 3/11 NPO   (for pending trach)  previous rx for Nepro 40 cc/hr x 24 hrs via NGT    GI: no V/D/abd distention, last BM 3/12     Enteral /Parenteral Nutrition: pt has been NPO x 2 days      Current Weight: 3/13 157, 3/12 165, 3/10 156, dosing wt 1/30 165lb   wt range 151-171 since adm, pt on HD, no edema noted      Pertinent Medications: MEDICATIONS  (STANDING):  ALBUTerol/ipratropium for Nebulization 3 milliLiter(s) Nebulizer every 6 hours  aspirin  chewable 81 milliGRAM(s) Oral daily  calamine Lotion 1 Application(s) Topical daily  ceFAZolin   IVPB 2000 milliGRAM(s) IV Intermittent <User Schedule>  ceFAZolin   IVPB 3000 milliGRAM(s) IV Intermittent <User Schedule>  ceFAZolin   IVPB 2000 milliGRAM(s) IV Intermittent once  dextrose 5%. 1000 milliLiter(s) (50 mL/Hr) IV Continuous <Continuous>  dextrose 5%. 1000 milliLiter(s) (50 mL/Hr) IV Continuous <Continuous>  dextrose 5%. 1000 milliLiter(s) (50 mL/Hr) IV Continuous <Continuous>  dextrose 50% Injectable 12.5 Gram(s) IV Push once  dextrose 50% Injectable 25 Gram(s) IV Push once  dextrose 50% Injectable 25 Gram(s) IV Push once  epoetin odalis Injectable 61880 Unit(s) SubCutaneous <User Schedule>  heparin  Injectable 5000 Unit(s) SubCutaneous every 8 hours  insulin lispro (HumaLOG) corrective regimen sliding scale   SubCutaneous every 6 hours  nystatin Powder 1 Application(s) Topical two times a day  pantoprazole  Injectable 40 milliGRAM(s) IV Push daily  propofol Infusion 20 MICROgram(s)/kG/Min (8.976 mL/Hr) IV Continuous <Continuous>  sodium chloride 3%  Inhalation 3 milliLiter(s) Inhalation four times a day    MEDICATIONS  (PRN):  acetaminophen    Suspension. 650 milliGRAM(s) Oral every 6 hours PRN Mild Pain (1 - 3)  chlorhexidine 0.12% Liquid 15 milliLiter(s) Swish and Spit every 4 hours PRN mouth hygiene  dextrose Gel 1 Dose(s) Oral once PRN Blood Glucose LESS THAN 70 milliGRAM(s)/deciliter  glucagon  Injectable 1 milliGRAM(s) IntraMuscular once PRN Glucose LESS THAN 70 milligrams/deciliter  hydrocortisone 1% Ointment 1 Application(s) Topical every 8 hours PRN Rash and/or Itching  ondansetron Injectable 4 milliGRAM(s) IV Push every 6 hours PRN Nausea and/or Vomiting    Pertinent Labs:  03-13 Na136 mmol/L Glu 169 mg/dL<H> K+ 4.0 mmol/L Cr  1.80 mg/dL<H> BUN 25 mg/dL<H> 03-13 Phos 3.6 mg/dL 03-13 Alb 2.3 g/dL<L>  G 134, 104, 158, 113    Skin: no pressure injuries, + skin tears    Estimated Needs:   [x ] no change since previous assessment  [ ] recalculated:       Previous Nutrition Diagnosis: [x ] Increased Nutrient Needs, protein    Nutrition Diagnosis is [x ] ongoing  [ ] resolved [ ] not applicable   pt currently NPO       New Nutrition Diagnosis: [x ] not applicable       Interventions:     Recommend    [ ] Change Diet To:    [ ] Nutrition Supplement    [x ] Nutrition Support: When EN initiated, recommend Nepro goal  55 cc/hr x 18 hrs provides 1782 kcals, 80 gm protein, 720cc free water for 24 kcals/kg dosing wt 74.1 kg, 1.5gm protein/kg IBW 52.3kg    [ ] Other:        Monitoring and Evaluation:     [ ] PO intake [ ] Tolerance to diet prescription [x ] weights [x ] follow up per protocol    [x ] other: NPO status

## 2018-03-13 NOTE — PROGRESS NOTE ADULT - PROBLEM SELECTOR PLAN 1
-test BG Q6h  - continue moderate scale q6  -TF will be restarted this afternoon so give NPH 8 units at 6pm and 12am, then discontinue as patient will stop TF tomorrow at 5/6am as she will have trach placement. The PEG will be Thursday.  -Patria Weiner MD  [phone]

## 2018-03-13 NOTE — PROGRESS NOTE ADULT - ATTENDING COMMENTS
#RUSS- oligoanuric  ATN-  increase in UO per /daughter; but no improvement in bun/cr; plan hd today; monitoring bladder scan in MICU  #access- had shiley- s/p permcath 3/9;   #for peg/trach per micu; optimize today with HD  #anemia -epo tiw with HD; change to SC; check iron studies #RUSS- oliguric  ATN-   no improvement in bun/cr; plan hd today; monitoring bladder scan in MICU' s/p hd 3/12. plan hd 3/14/18  #access- had shiley- s/p permcath 3/9;   #for peg/trach per micu;    #anemia -epo tiw with HD; change to SC; check iron studies

## 2018-03-14 LAB
ALBUMIN SERPL ELPH-MCNC: 2.4 G/DL — LOW (ref 3.3–5)
ALBUMIN SERPL ELPH-MCNC: 2.5 G/DL — LOW (ref 3.3–5)
ALP SERPL-CCNC: 211 U/L — HIGH (ref 40–120)
ALP SERPL-CCNC: 260 U/L — HIGH (ref 40–120)
ALT FLD-CCNC: <5 U/L RC — LOW (ref 10–45)
ALT FLD-CCNC: <5 U/L RC — LOW (ref 10–45)
ANION GAP SERPL CALC-SCNC: 15 MMOL/L — SIGNIFICANT CHANGE UP (ref 5–17)
ANION GAP SERPL CALC-SCNC: 16 MMOL/L — SIGNIFICANT CHANGE UP (ref 5–17)
APTT BLD: 25.7 SEC — LOW (ref 27.5–37.4)
APTT BLD: 27.5 SEC — SIGNIFICANT CHANGE UP (ref 27.5–37.4)
AST SERPL-CCNC: 21 U/L — SIGNIFICANT CHANGE UP (ref 10–40)
AST SERPL-CCNC: 27 U/L — SIGNIFICANT CHANGE UP (ref 10–40)
BILIRUB SERPL-MCNC: 0.3 MG/DL — SIGNIFICANT CHANGE UP (ref 0.2–1.2)
BILIRUB SERPL-MCNC: 0.3 MG/DL — SIGNIFICANT CHANGE UP (ref 0.2–1.2)
BUN SERPL-MCNC: 15 MG/DL — SIGNIFICANT CHANGE UP (ref 7–23)
BUN SERPL-MCNC: 35 MG/DL — HIGH (ref 7–23)
CALCIUM SERPL-MCNC: 8.6 MG/DL — SIGNIFICANT CHANGE UP (ref 8.4–10.5)
CALCIUM SERPL-MCNC: 8.7 MG/DL — SIGNIFICANT CHANGE UP (ref 8.4–10.5)
CHLORIDE SERPL-SCNC: 93 MMOL/L — LOW (ref 96–108)
CHLORIDE SERPL-SCNC: 94 MMOL/L — LOW (ref 96–108)
CO2 SERPL-SCNC: 25 MMOL/L — SIGNIFICANT CHANGE UP (ref 22–31)
CO2 SERPL-SCNC: 25 MMOL/L — SIGNIFICANT CHANGE UP (ref 22–31)
CREAT SERPL-MCNC: 1.33 MG/DL — HIGH (ref 0.5–1.3)
CREAT SERPL-MCNC: 2.56 MG/DL — HIGH (ref 0.5–1.3)
GAS PNL BLDA: SIGNIFICANT CHANGE UP
GLUCOSE BLDC GLUCOMTR-MCNC: 123 MG/DL — HIGH (ref 70–99)
GLUCOSE BLDC GLUCOMTR-MCNC: 124 MG/DL — HIGH (ref 70–99)
GLUCOSE BLDC GLUCOMTR-MCNC: 144 MG/DL — HIGH (ref 70–99)
GLUCOSE BLDC GLUCOMTR-MCNC: 211 MG/DL — HIGH (ref 70–99)
GLUCOSE SERPL-MCNC: 172 MG/DL — HIGH (ref 70–99)
GLUCOSE SERPL-MCNC: 212 MG/DL — HIGH (ref 70–99)
HCT VFR BLD CALC: 23.2 % — LOW (ref 34.5–45)
HCT VFR BLD CALC: 26.2 % — LOW (ref 34.5–45)
HGB BLD-MCNC: 7.5 G/DL — LOW (ref 11.5–15.5)
HGB BLD-MCNC: 8.8 G/DL — LOW (ref 11.5–15.5)
INR BLD: 0.93 RATIO — SIGNIFICANT CHANGE UP (ref 0.88–1.16)
INR BLD: 0.98 RATIO — SIGNIFICANT CHANGE UP (ref 0.88–1.16)
MAGNESIUM SERPL-MCNC: 1.9 MG/DL — SIGNIFICANT CHANGE UP (ref 1.6–2.6)
MAGNESIUM SERPL-MCNC: 2.2 MG/DL — SIGNIFICANT CHANGE UP (ref 1.6–2.6)
MCHC RBC-ENTMCNC: 31.2 PG — SIGNIFICANT CHANGE UP (ref 27–34)
MCHC RBC-ENTMCNC: 31.8 PG — SIGNIFICANT CHANGE UP (ref 27–34)
MCHC RBC-ENTMCNC: 32.3 GM/DL — SIGNIFICANT CHANGE UP (ref 32–36)
MCHC RBC-ENTMCNC: 33.4 GM/DL — SIGNIFICANT CHANGE UP (ref 32–36)
MCV RBC AUTO: 95.2 FL — SIGNIFICANT CHANGE UP (ref 80–100)
MCV RBC AUTO: 96.4 FL — SIGNIFICANT CHANGE UP (ref 80–100)
PHOSPHATE SERPL-MCNC: 3 MG/DL — SIGNIFICANT CHANGE UP (ref 2.5–4.5)
PHOSPHATE SERPL-MCNC: 5.8 MG/DL — HIGH (ref 2.5–4.5)
PLATELET # BLD AUTO: 300 K/UL — SIGNIFICANT CHANGE UP (ref 150–400)
PLATELET # BLD AUTO: 345 K/UL — SIGNIFICANT CHANGE UP (ref 150–400)
POTASSIUM SERPL-MCNC: 3.4 MMOL/L — LOW (ref 3.5–5.3)
POTASSIUM SERPL-MCNC: 3.6 MMOL/L — SIGNIFICANT CHANGE UP (ref 3.5–5.3)
POTASSIUM SERPL-SCNC: 3.4 MMOL/L — LOW (ref 3.5–5.3)
POTASSIUM SERPL-SCNC: 3.6 MMOL/L — SIGNIFICANT CHANGE UP (ref 3.5–5.3)
PROT SERPL-MCNC: 6.7 G/DL — SIGNIFICANT CHANGE UP (ref 6–8.3)
PROT SERPL-MCNC: 7.4 G/DL — SIGNIFICANT CHANGE UP (ref 6–8.3)
PROTHROM AB SERPL-ACNC: 10.1 SEC — SIGNIFICANT CHANGE UP (ref 9.8–12.7)
PROTHROM AB SERPL-ACNC: 10.6 SEC — SIGNIFICANT CHANGE UP (ref 9.8–12.7)
RBC # BLD: 2.4 M/UL — LOW (ref 3.8–5.2)
RBC # BLD: 2.75 M/UL — LOW (ref 3.8–5.2)
RBC # FLD: 15.4 % — HIGH (ref 10.3–14.5)
RBC # FLD: 15.9 % — HIGH (ref 10.3–14.5)
SODIUM SERPL-SCNC: 134 MMOL/L — LOW (ref 135–145)
SODIUM SERPL-SCNC: 134 MMOL/L — LOW (ref 135–145)
WBC # BLD: 4.3 K/UL — SIGNIFICANT CHANGE UP (ref 3.8–10.5)
WBC # BLD: 7 K/UL — SIGNIFICANT CHANGE UP (ref 3.8–10.5)
WBC # FLD AUTO: 4.3 K/UL — SIGNIFICANT CHANGE UP (ref 3.8–10.5)
WBC # FLD AUTO: 7 K/UL — SIGNIFICANT CHANGE UP (ref 3.8–10.5)

## 2018-03-14 PROCEDURE — 31622 DX BRONCHOSCOPE/WASH: CPT | Mod: 59,GC

## 2018-03-14 PROCEDURE — 99232 SBSQ HOSP IP/OBS MODERATE 35: CPT

## 2018-03-14 PROCEDURE — 31500 INSERT EMERGENCY AIRWAY: CPT | Mod: GC

## 2018-03-14 PROCEDURE — 99233 SBSQ HOSP IP/OBS HIGH 50: CPT | Mod: GC

## 2018-03-14 PROCEDURE — 31600 PLANNED TRACHEOSTOMY: CPT | Mod: GC

## 2018-03-14 PROCEDURE — 99231 SBSQ HOSP IP/OBS SF/LOW 25: CPT | Mod: GC

## 2018-03-14 PROCEDURE — 71045 X-RAY EXAM CHEST 1 VIEW: CPT | Mod: 26

## 2018-03-14 PROCEDURE — 99291 CRITICAL CARE FIRST HOUR: CPT | Mod: 25

## 2018-03-14 RX ORDER — POLYETHYLENE GLYCOL 3350 17 G/17G
17 POWDER, FOR SOLUTION ORAL DAILY
Qty: 0 | Refills: 0 | Status: DISCONTINUED | OUTPATIENT
Start: 2018-03-14 | End: 2018-03-16

## 2018-03-14 RX ORDER — ROCURONIUM BROMIDE 10 MG/ML
70 VIAL (ML) INTRAVENOUS ONCE
Qty: 0 | Refills: 0 | Status: COMPLETED | OUTPATIENT
Start: 2018-03-14 | End: 2018-03-14

## 2018-03-14 RX ORDER — FENTANYL CITRATE 50 UG/ML
200 INJECTION INTRAVENOUS ONCE
Qty: 0 | Refills: 0 | Status: DISCONTINUED | OUTPATIENT
Start: 2018-03-14 | End: 2018-03-14

## 2018-03-14 RX ORDER — MIDAZOLAM HYDROCHLORIDE 1 MG/ML
8 INJECTION, SOLUTION INTRAMUSCULAR; INTRAVENOUS ONCE
Qty: 0 | Refills: 0 | Status: DISCONTINUED | OUTPATIENT
Start: 2018-03-14 | End: 2018-03-14

## 2018-03-14 RX ORDER — MIDAZOLAM HYDROCHLORIDE 1 MG/ML
4 INJECTION, SOLUTION INTRAMUSCULAR; INTRAVENOUS ONCE
Qty: 0 | Refills: 0 | Status: DISCONTINUED | OUTPATIENT
Start: 2018-03-14 | End: 2018-03-14

## 2018-03-14 RX ORDER — FENTANYL CITRATE 50 UG/ML
100 INJECTION INTRAVENOUS ONCE
Qty: 0 | Refills: 0 | Status: DISCONTINUED | OUTPATIENT
Start: 2018-03-14 | End: 2018-03-14

## 2018-03-14 RX ORDER — NOREPINEPHRINE BITARTRATE/D5W 8 MG/250ML
0.05 PLASTIC BAG, INJECTION (ML) INTRAVENOUS
Qty: 8 | Refills: 0 | Status: DISCONTINUED | OUTPATIENT
Start: 2018-03-14 | End: 2018-03-15

## 2018-03-14 RX ORDER — SENNA PLUS 8.6 MG/1
10 TABLET ORAL AT BEDTIME
Qty: 0 | Refills: 0 | Status: DISCONTINUED | OUTPATIENT
Start: 2018-03-14 | End: 2018-03-16

## 2018-03-14 RX ADMIN — Medication 200 MILLIGRAM(S): at 21:03

## 2018-03-14 RX ADMIN — Medication 70 MILLIGRAM(S): at 12:56

## 2018-03-14 RX ADMIN — SENNA PLUS 10 MILLILITER(S): 8.6 TABLET ORAL at 21:03

## 2018-03-14 RX ADMIN — ERYTHROPOIETIN 10000 UNIT(S): 10000 INJECTION, SOLUTION INTRAVENOUS; SUBCUTANEOUS at 17:31

## 2018-03-14 RX ADMIN — MIDAZOLAM HYDROCHLORIDE 4 MILLIGRAM(S): 1 INJECTION, SOLUTION INTRAMUSCULAR; INTRAVENOUS at 11:36

## 2018-03-14 RX ADMIN — SODIUM CHLORIDE 3 MILLILITER(S): 9 INJECTION INTRAMUSCULAR; INTRAVENOUS; SUBCUTANEOUS at 05:38

## 2018-03-14 RX ADMIN — POLYETHYLENE GLYCOL 3350 17 GRAM(S): 17 POWDER, FOR SOLUTION ORAL at 11:37

## 2018-03-14 RX ADMIN — NYSTATIN CREAM 1 APPLICATION(S): 100000 CREAM TOPICAL at 05:18

## 2018-03-14 RX ADMIN — FENTANYL CITRATE 100 MICROGRAM(S): 50 INJECTION INTRAVENOUS at 13:18

## 2018-03-14 RX ADMIN — FENTANYL CITRATE 100 MICROGRAM(S): 50 INJECTION INTRAVENOUS at 13:58

## 2018-03-14 RX ADMIN — SODIUM CHLORIDE 3 MILLILITER(S): 9 INJECTION INTRAMUSCULAR; INTRAVENOUS; SUBCUTANEOUS at 00:06

## 2018-03-14 RX ADMIN — Medication 70 MILLIGRAM(S): at 11:10

## 2018-03-14 RX ADMIN — Medication 6.84 MICROGRAM(S)/KG/MIN: at 18:32

## 2018-03-14 RX ADMIN — SODIUM CHLORIDE 3 MILLILITER(S): 9 INJECTION INTRAMUSCULAR; INTRAVENOUS; SUBCUTANEOUS at 17:43

## 2018-03-14 RX ADMIN — Medication 3 MILLILITER(S): at 05:38

## 2018-03-14 RX ADMIN — PANTOPRAZOLE SODIUM 40 MILLIGRAM(S): 20 TABLET, DELAYED RELEASE ORAL at 11:37

## 2018-03-14 RX ADMIN — MIDAZOLAM HYDROCHLORIDE 4 MILLIGRAM(S): 1 INJECTION, SOLUTION INTRAMUSCULAR; INTRAVENOUS at 12:45

## 2018-03-14 RX ADMIN — Medication 3 MILLILITER(S): at 17:43

## 2018-03-14 RX ADMIN — Medication 2: at 00:47

## 2018-03-14 RX ADMIN — NYSTATIN CREAM 1 APPLICATION(S): 100000 CREAM TOPICAL at 18:03

## 2018-03-14 RX ADMIN — CALAMINE 8% AND ZINC OXIDE 8% 1 APPLICATION(S): 160 LOTION TOPICAL at 11:37

## 2018-03-14 RX ADMIN — PROPOFOL 8.98 MICROGRAM(S)/KG/MIN: 10 INJECTION, EMULSION INTRAVENOUS at 18:32

## 2018-03-14 RX ADMIN — Medication 3 MILLILITER(S): at 00:04

## 2018-03-14 RX ADMIN — Medication 81 MILLIGRAM(S): at 18:03

## 2018-03-14 RX ADMIN — Medication 3 MILLILITER(S): at 11:42

## 2018-03-14 RX ADMIN — SODIUM CHLORIDE 3 MILLILITER(S): 9 INJECTION INTRAMUSCULAR; INTRAVENOUS; SUBCUTANEOUS at 11:44

## 2018-03-14 NOTE — PROCEDURE NOTE - NSPROCNAME_GEN_A_CORE
Central Line Insertion
Chest Tube
General
General
Tracheal Intubation
Central Line Insertion
Central Line Insertion
Chest Tube

## 2018-03-14 NOTE — PROGRESS NOTE ADULT - PROBLEM SELECTOR PLAN 1
from ATN in setting of cardiac arrest. Pt continues to be oligo-anuric, requiring dialysis. No evidence of renal recovery at present.  Patient s/p tunneled HD catheter placement  by IR . Last HD was done on 3/12. Labs reviewed from today; will arrange for HD today. Monitor BMP daily.

## 2018-03-14 NOTE — PROGRESS NOTE ADULT - SUBJECTIVE AND OBJECTIVE BOX
Diabetes Follow up note:  Interval Hx:      Review of Systems:  General:  GI: Tolerating POs without any N/V/D/ABD PAIN.  CV: No CP/SOB  ENDO: No S&Sx of hypoglycemia  MEDS:    insulin lispro (HumaLOG) corrective regimen sliding scale   SubCutaneous every 6 hours  insulin NPH human recombinant 8 Unit(s) SubCutaneous <User Schedule>    ceFAZolin   IVPB 2000 milliGRAM(s) IV Intermittent <User Schedule>  ceFAZolin   IVPB 3000 milliGRAM(s) IV Intermittent <User Schedule>    Allergies    doxycycline (Rash)  isoniazid (Rash)  NIFEdipine (Urticaria; Hives)  vitamin E (Short breath; Urticaria; Hives)        PE:  General:  Vital Signs Last 24 Hrs  T(C): 36.2 (14 Mar 2018 13:00), Max: 37.2 (13 Mar 2018 16:00)  T(F): 97.1 (14 Mar 2018 13:00), Max: 99 (13 Mar 2018 16:00)  HR: 98 (14 Mar 2018 13:00) (75 - 103)  BP: 151/71 (14 Mar 2018 13:00) (126/58 - 174/78)  BP(mean): 102 (14 Mar 2018 13:00) (83 - 112)  RR: 32 (14 Mar 2018 13:00) (7 - 32)  SpO2: 100% (14 Mar 2018 13:00) (96% - 100%)  Abd: Soft, NT,ND,   Extremities: Warm  Neuro: A&O X3    LABS:    POCT Blood Glucose.: 124 mg/dL (03-14-18 @ 11:42)  POCT Blood Glucose.: 123 mg/dL (03-14-18 @ 05:23)  POCT Blood Glucose.: 127 mg/dL (03-13-18 @ 16:52)  POCT Blood Glucose.: 134 mg/dL (03-13-18 @ 11:02)  POCT Blood Glucose.: 104 mg/dL (03-13-18 @ 06:16)  POCT Blood Glucose.: 158 mg/dL (03-13-18 @ 00:43)  POCT Blood Glucose.: 113 mg/dL (03-12-18 @ 17:05)  POCT Blood Glucose.: 142 mg/dL (03-12-18 @ 10:49)  POCT Blood Glucose.: 150 mg/dL (03-12-18 @ 06:13)  POCT Blood Glucose.: 109 mg/dL (03-11-18 @ 23:55)  POCT Blood Glucose.: 159 mg/dL (03-11-18 @ 17:02)                            7.5    4.3   )-----------( 345      ( 13 Mar 2018 23:57 )             23.2       03-13    134<L>  |  94<L>  |  35<H>  ----------------------------<  172<H>  3.4<L>   |  25  |  2.56<H>    Ca    8.7      13 Mar 2018 23:56  Phos  5.8     03-13  Mg     2.2     03-13    TPro  6.7  /  Alb  2.4<L>  /  TBili  0.3  /  DBili  x   /  AST  21  /  ALT  <5<L>  /  AlkPhos  211<H>  03-13      Thyroid Function Tests:      Hemoglobin A1C, Whole Blood: 8.6 % <H> [4.0 - 5.6] (01-31-18 @ 07:15)  Hemoglobin A1C, Whole Blood: 8.7 % <H> [4.0 - 5.6] (01-31-18 @ 05:51)            Contact number: marcelino [phone] or [phone] Diabetes Follow up note:  Interval Hx:  69 y/o F with T2DM uncontrolled with neuropathy and ESRD now on HD on insulin, osteoporosis, HTN, here with acute respiratory failure 2/2 influenza s/p CVA and PEA arrest x 2, and MSSA bacteremia on month long tx for aspiration pna, re-intubated on 3.11.18 for hypoxia/respiratory distress s/p Trach today. Pt seen at bedside w/family present. Currently sedated on propofol w/tube feeds off. BG values at goal while NPO.       Review of Systems:  Sedated-unable to assess.   MEDS:    insulin lispro (HumaLOG) corrective regimen sliding scale   SubCutaneous every 6 hours  insulin NPH human recombinant 8 Unit(s) SubCutaneous <User Schedule>    ceFAZolin   IVPB 2000 milliGRAM(s) IV Intermittent <User Schedule>  ceFAZolin   IVPB 3000 milliGRAM(s) IV Intermittent <User Schedule>    Allergies    doxycycline (Rash)  isoniazid (Rash)  NIFEdipine (Urticaria; Hives)  vitamin E (Short breath; Urticaria; Hives)        PE:  General: Female lying in bed. NAD.   Vital Signs Last 24 Hrs  T(C): 36.2 (14 Mar 2018 13:00), Max: 37.2 (13 Mar 2018 16:00)  T(F): 97.1 (14 Mar 2018 13:00), Max: 99 (13 Mar 2018 16:00)  HR: 98 (14 Mar 2018 13:00) (75 - 103)  BP: 151/71 (14 Mar 2018 13:00) (126/58 - 174/78)  BP(mean): 102 (14 Mar 2018 13:00) (83 - 112)  RR: 32 (14 Mar 2018 13:00) (7 - 32)  SpO2: 100% (14 Mar 2018 13:00) (96% - 100%)  CV: S1, S2. NSR on monitor  Resp: CTA/BL. Trach in place on ventilator  Abd: Soft, NT,ND, NGT in place  Extremities: Warm  Neuro: Sedated on propofol    LABS:    POCT Blood Glucose.: 124 mg/dL (03-14-18 @ 11:42)  POCT Blood Glucose.: 123 mg/dL (03-14-18 @ 05:23)  POCT Blood Glucose.: 127 mg/dL (03-13-18 @ 16:52)  POCT Blood Glucose.: 134 mg/dL (03-13-18 @ 11:02)  POCT Blood Glucose.: 104 mg/dL (03-13-18 @ 06:16)  POCT Blood Glucose.: 158 mg/dL (03-13-18 @ 00:43)  POCT Blood Glucose.: 113 mg/dL (03-12-18 @ 17:05)  POCT Blood Glucose.: 142 mg/dL (03-12-18 @ 10:49)  POCT Blood Glucose.: 150 mg/dL (03-12-18 @ 06:13)  POCT Blood Glucose.: 109 mg/dL (03-11-18 @ 23:55)  POCT Blood Glucose.: 159 mg/dL (03-11-18 @ 17:02)                            7.5    4.3   )-----------( 345      ( 13 Mar 2018 23:57 )             23.2       03-13    134<L>  |  94<L>  |  35<H>  ----------------------------<  172<H>  3.4<L>   |  25  |  2.56<H>    Ca    8.7      13 Mar 2018 23:56  Phos  5.8     03-13  Mg     2.2     03-13    TPro  6.7  /  Alb  2.4<L>  /  TBili  0.3  /  DBili  x   /  AST  21  /  ALT  <5<L>  /  AlkPhos  211<H>  03-13         Hemoglobin A1C, Whole Blood: 8.6 % <H> [4.0 - 5.6] (01-31-18 @ 07:15)  Hemoglobin A1C, Whole Blood: 8.7 % <H> [4.0 - 5.6] (01-31-18 @ 05:51)            Contact number: marcelino [phone] or [phone]

## 2018-03-14 NOTE — PROGRESS NOTE ADULT - ATTENDING COMMENTS
#RUSS- oliguric  ATN-   no improvement in bun/cr; plan hd today; monitoring bladder scan in MICU' s/p hd 3/12. plan hd 3/14/18  #access- had shiley- s/p permcath 3/9;   #for peg/trach per micu;    #anemia -epo tiw with HD; change to SC; check iron studies #RUSS- oliguric  ATN-   no improvement in bun/cr; plan hd today; monitoring bladder scan in MICU' ; plan hd today for trach today as well  #access- had shiley- s/p permcath 3/9;   #for  trach per micu;    #anemia -epo tiw with HD;   check iron studies

## 2018-03-14 NOTE — PROGRESS NOTE ADULT - SUBJECTIVE AND OBJECTIVE BOX
Chief Complaint:  Patient is a 68y old  Female who presents with a chief complaint of Fever, total body weakness (2018 13:44)      Interval Events:     Allergies:  doxycycline (Rash)  isoniazid (Rash)  NIFEdipine (Urticaria; Hives)  vitamin E (Short breath; Urticaria; Hives)      Hospital Medications:  acetaminophen    Suspension. 650 milliGRAM(s) Oral every 6 hours PRN  ALBUTerol/ipratropium for Nebulization 3 milliLiter(s) Nebulizer every 6 hours  aspirin  chewable 81 milliGRAM(s) Oral daily  calamine Lotion 1 Application(s) Topical daily  ceFAZolin   IVPB 2000 milliGRAM(s) IV Intermittent <User Schedule>  ceFAZolin   IVPB 3000 milliGRAM(s) IV Intermittent <User Schedule>  chlorhexidine 0.12% Liquid 15 milliLiter(s) Swish and Spit every 4 hours PRN  dextrose 5%. 1000 milliLiter(s) IV Continuous <Continuous>  dextrose 5%. 1000 milliLiter(s) IV Continuous <Continuous>  dextrose 5%. 1000 milliLiter(s) IV Continuous <Continuous>  dextrose 50% Injectable 12.5 Gram(s) IV Push once  dextrose 50% Injectable 25 Gram(s) IV Push once  dextrose 50% Injectable 25 Gram(s) IV Push once  dextrose Gel 1 Dose(s) Oral once PRN  epoetin odalis Injectable 90252 Unit(s) SubCutaneous <User Schedule>  glucagon  Injectable 1 milliGRAM(s) IntraMuscular once PRN  heparin  Injectable 5000 Unit(s) SubCutaneous every 8 hours  hydrocortisone 1% Ointment 1 Application(s) Topical every 8 hours PRN  insulin lispro (HumaLOG) corrective regimen sliding scale   SubCutaneous every 6 hours  insulin NPH human recombinant 8 Unit(s) SubCutaneous <User Schedule>  nystatin Powder 1 Application(s) Topical two times a day  ondansetron Injectable 4 milliGRAM(s) IV Push every 6 hours PRN  pantoprazole  Injectable 40 milliGRAM(s) IV Push daily  polyethylene glycol 3350 17 Gram(s) Oral daily  propofol Infusion 20 MICROgram(s)/kG/Min IV Continuous <Continuous>  senna Syrup 10 milliLiter(s) Oral at bedtime  sodium chloride 3%  Inhalation 3 milliLiter(s) Inhalation four times a day      PMHX/PSHX:  Diabetes  Breast CA  H/O mastectomy, bilateral  No significant past surgical history      Family history:  No pertinent family history in first degree relatives      ROS:       unable to assess       PHYSICAL EXAM:     GENERAL:  Appears stated age, well-groomed, sedated   HEENT:  NC/AT,  conjunctivae clear  CHEST:  ETT in place, NG present, vent sounds+  HEART:  Regular rhythm, S1, S2  ABDOMEN:  Soft, non-tender, non-distended, normoactive bowel sounds  EXTREMITIES:  no edema  SKIN:  No rash  NEURO:  sedated       Vital Signs:  Vital Signs Last 24 Hrs  T(C): 37 (14 Mar 2018 04:00), Max: 37.4 (13 Mar 2018 12:00)  T(F): 98.6 (14 Mar 2018 04:00), Max: 99.3 (13 Mar 2018 12:00)  HR: 83 (14 Mar 2018 08:30) (80 - 103)  BP: 158/70 (14 Mar 2018 06:00) (128/60 - 173/73)  BP(mean): 100 (14 Mar 2018 06:00) (83 - 106)  RR: 16 (14 Mar 2018 06:00) (13 - 29)  SpO2: 100% (14 Mar 2018 08:30) (96% - 100%)  Daily     Daily Weight in k.9 (14 Mar 2018 04:00)    LABS:                        7.5    4.3   )-----------( 345      ( 13 Mar 2018 23:57 )             23.2         134<L>  |  94<L>  |  35<H>  ----------------------------<  172<H>  3.4<L>   |  25  |  2.56<H>    Ca    8.7      13 Mar 2018 23:56  Phos  5.8     -  Mg     2.2         TPro  6.7  /  Alb  2.4<L>  /  TBili  0.3  /  DBili  x   /  AST  21  /  ALT  <5<L>  /  AlkPhos  211<H>      LIVER FUNCTIONS - ( 13 Mar 2018 23:56 )  Alb: 2.4 g/dL / Pro: 6.7 g/dL / ALK PHOS: 211 U/L / ALT: <5 U/L RC / AST: 21 U/L / GGT: x           PT/INR - ( 13 Mar 2018 23:56 )   PT: 10.6 sec;   INR: 0.98 ratio         PTT - ( 13 Mar 2018 23:56 )  PTT:27.5 sec        Imaging:      Imaging:    < from: US Abdomen Upper Quadrant Right (02.15.18 @ 13:11) >  FINDINGS:    Liver: Within normal limits.    Bile ducts: Normal caliber. Common bile duct measures 3 mm.     Gallbladder: Mild sludge. Negative sonographic Arriola sign. No   gallstones.        Pancreas: Visualized portions are within normal limits.    Right kidney: 10 cm. No hydronephrosis.        Ascites: None.    IVC: Visualized portions are within normal limits.    IMPRESSION:     Mild gallbladder sludge.        < end of copied text > Chief Complaint:  Patient is a 68y old  Female who presents with a chief complaint of Fever, total body weakness (2018 13:44)      Interval Events:   Patient seen and examined this am.     Allergies:  doxycycline (Rash)  isoniazid (Rash)  NIFEdipine (Urticaria; Hives)  vitamin E (Short breath; Urticaria; Hives)      Hospital Medications:  acetaminophen    Suspension. 650 milliGRAM(s) Oral every 6 hours PRN  ALBUTerol/ipratropium for Nebulization 3 milliLiter(s) Nebulizer every 6 hours  aspirin  chewable 81 milliGRAM(s) Oral daily  calamine Lotion 1 Application(s) Topical daily  ceFAZolin   IVPB 2000 milliGRAM(s) IV Intermittent <User Schedule>  ceFAZolin   IVPB 3000 milliGRAM(s) IV Intermittent <User Schedule>  chlorhexidine 0.12% Liquid 15 milliLiter(s) Swish and Spit every 4 hours PRN  dextrose 5%. 1000 milliLiter(s) IV Continuous <Continuous>  dextrose 5%. 1000 milliLiter(s) IV Continuous <Continuous>  dextrose 5%. 1000 milliLiter(s) IV Continuous <Continuous>  dextrose 50% Injectable 12.5 Gram(s) IV Push once  dextrose 50% Injectable 25 Gram(s) IV Push once  dextrose 50% Injectable 25 Gram(s) IV Push once  dextrose Gel 1 Dose(s) Oral once PRN  epoetin odalis Injectable 53706 Unit(s) SubCutaneous <User Schedule>  glucagon  Injectable 1 milliGRAM(s) IntraMuscular once PRN  heparin  Injectable 5000 Unit(s) SubCutaneous every 8 hours  hydrocortisone 1% Ointment 1 Application(s) Topical every 8 hours PRN  insulin lispro (HumaLOG) corrective regimen sliding scale   SubCutaneous every 6 hours  insulin NPH human recombinant 8 Unit(s) SubCutaneous <User Schedule>  nystatin Powder 1 Application(s) Topical two times a day  ondansetron Injectable 4 milliGRAM(s) IV Push every 6 hours PRN  pantoprazole  Injectable 40 milliGRAM(s) IV Push daily  polyethylene glycol 3350 17 Gram(s) Oral daily  propofol Infusion 20 MICROgram(s)/kG/Min IV Continuous <Continuous>  senna Syrup 10 milliLiter(s) Oral at bedtime  sodium chloride 3%  Inhalation 3 milliLiter(s) Inhalation four times a day      PMHX/PSHX:  Diabetes  Breast CA  H/O mastectomy, bilateral  No significant past surgical history      Family history:  No pertinent family history in first degree relatives      ROS:       unable to assess       PHYSICAL EXAM:     GENERAL:  Appears stated age, well-groomed, sedated   HEENT:  NC/AT,  conjunctivae clear  CHEST:  ETT in place, NG present, vent sounds+  HEART:  Regular rhythm, S1, S2  ABDOMEN:  Soft, non-tender, non-distended, normoactive bowel sounds  EXTREMITIES:  no edema  SKIN:  No rash  NEURO:  sedated       Vital Signs:  Vital Signs Last 24 Hrs  T(C): 37 (14 Mar 2018 04:00), Max: 37.4 (13 Mar 2018 12:00)  T(F): 98.6 (14 Mar 2018 04:00), Max: 99.3 (13 Mar 2018 12:00)  HR: 83 (14 Mar 2018 08:30) (80 - 103)  BP: 158/70 (14 Mar 2018 06:00) (128/60 - 173/73)  BP(mean): 100 (14 Mar 2018 06:00) (83 - 106)  RR: 16 (14 Mar 2018 06:00) (13 - 29)  SpO2: 100% (14 Mar 2018 08:30) (96% - 100%)  Daily     Daily Weight in k.9 (14 Mar 2018 04:00)    LABS:                        7.5    4.3   )-----------( 345      ( 13 Mar 2018 23:57 )             23.2     03-13    134<L>  |  94<L>  |  35<H>  ----------------------------<  172<H>  3.4<L>   |  25  |  2.56<H>    Ca    8.7      13 Mar 2018 23:56  Phos  5.8     03-  Mg     2.2     -13    TPro  6.7  /  Alb  2.4<L>  /  TBili  0.3  /  DBili  x   /  AST  21  /  ALT  <5<L>  /  AlkPhos  211<H>  13    LIVER FUNCTIONS - ( 13 Mar 2018 23:56 )  Alb: 2.4 g/dL / Pro: 6.7 g/dL / ALK PHOS: 211 U/L / ALT: <5 U/L RC / AST: 21 U/L / GGT: x           PT/INR - ( 13 Mar 2018 23:56 )   PT: 10.6 sec;   INR: 0.98 ratio         PTT - ( 13 Mar 2018 23:56 )  PTT:27.5 sec        Imaging:      Imaging:    < from: US Abdomen Upper Quadrant Right (02.15.18 @ 13:11) >  FINDINGS:    Liver: Within normal limits.    Bile ducts: Normal caliber. Common bile duct measures 3 mm.     Gallbladder: Mild sludge. Negative sonographic Arriola sign. No   gallstones.        Pancreas: Visualized portions are within normal limits.    Right kidney: 10 cm. No hydronephrosis.        Ascites: None.    IVC: Visualized portions are within normal limits.    IMPRESSION:     Mild gallbladder sludge.        < end of copied text >

## 2018-03-14 NOTE — PROGRESS NOTE ADULT - ATTENDING COMMENTS
Agree with above. Seen and examined with residents. For tracheostomy today. Continue supportive care.

## 2018-03-14 NOTE — PROGRESS NOTE ADULT - SUBJECTIVE AND OBJECTIVE BOX
CC: F/U for MSSA    Saw/spoke to patient. No fevers, no chills. Remains intubated. No new events noted.    Allergies  doxycycline (Rash)  isoniazid (Rash)  NIFEdipine (Urticaria; Hives)  vitamin E (Short breath; Urticaria; Hives)    ANTIMICROBIALS:  ceFAZolin   IVPB 2000 <User Schedule>  ceFAZolin   IVPB 3000 <User Schedule>    PE:    Vital Signs Last 24 Hrs  T(C): 36.6 (14 Mar 2018 08:00), Max: 37.4 (13 Mar 2018 12:00)  T(F): 97.8 (14 Mar 2018 08:00), Max: 99.3 (13 Mar 2018 12:00)  HR: 78 (14 Mar 2018 10:00) (78 - 103)  BP: 128/60 (14 Mar 2018 10:00) (128/60 - 173/73)  BP(mean): 86 (14 Mar 2018 10:00) (83 - 106)  RR: 15 (14 Mar 2018 10:00) (7 - 29)  SpO2: 100% (14 Mar 2018 10:00) (96% - 100%)    Gen: AOx0, sedated  CV: S1+S2 normal, no murmurs, nontachycardic  Resp: Intubated, no crackles  Abd: Soft, nontender, +BS, NGT  Ext: No LE edema, no wounds    LABS:                        7.5    4.3   )-----------( 345      ( 13 Mar 2018 23:57 )             23.2     03-13    134<L>  |  94<L>  |  35<H>  ----------------------------<  172<H>  3.4<L>   |  25  |  2.56<H>    Ca    8.7      13 Mar 2018 23:56  Phos  5.8     03-13  Mg     2.2     03-13    TPro  6.7  /  Alb  2.4<L>  /  TBili  0.3  /  DBili  x   /  AST  21  /  ALT  <5<L>  /  AlkPhos  211<H>  03-13    MICROBIOLOGY:    .Blood Blood-Venous  02-27-18   No growth at 5 days.     .Blood Blood-Peripheral  02-24-18   No growth at 5 days.     .Sputum Sputum trap  02-22-18   Numerous Staphylococcus aureus ***********Note************  (MSSA)    .Blood Blood  02-22-18   Growth in anaerobic bottle: Staphylococcus aureus  (MSSA)    .Blood Blood  02-22-18   No growth at 5 days.    .Blood Blood-Peripheral  02-19-18   No growth at 5 days.    RADIOLOGY:    No new available

## 2018-03-14 NOTE — PROCEDURE NOTE - ADDITIONAL PROCEDURE DETAILS
Bronchoscope inserted through ETT. ETT noted to be in good position. Airway evaluation revealed normal mucosa and minimal mucus. Bronchoscope then withdrawn from ETT. Following the trach, the bronchoscope was inserted through the trach and it was noted to be in good position    Specimens: none

## 2018-03-14 NOTE — CHART NOTE - NSCHARTNOTEFT_GEN_A_CORE
Consent was obtained explaining the risks and benefits of the procedure. The patient was placed supine with a roll under shoulders to hyperextend the neck.  The FiO2 was increased to 1.0 and endotracheal tube was adjusted under fiberoptic guidance to the highest position safely possible.  The bronchoscope was introduced into the distal end of the endotracheal tube to monitor the procedure.  Tracheal landmarks were identified and marked.  The procedure site was prepped and draped and the patient sedated on a propofol drip. The overlying skin was anesthetized with 1% lidocaine with epinephrine and a thin gauge needle gradually inserted below the second tracheal ring in the midline until air was aspirated and it was visualized endoscopically.  A wire was then threaded into the trachea cephalad.  A small horizontal incision was made adjacent to the wire through the skin only.  The dilator was then introduced into the trachea.  Subsequently, a tracheostomy tube was inserted over the obturator and the position confirmed and the wire and obturator were removed and the cuff inflated. The ventilator circuit was then switched from the endotracheal tube to the tracheostomy. However volumes were not returned on the ventilator. Patient was reintubated. Tracheostomy attempted again and was successfully inserted.      The ventilator circuit was then switched from the endotracheal tube to the tracheostomy and adequate ventilation assured. The endotracheal tube cuff was then deflated and the endotracheal tube removed.  The entire procedure was monitored with ventilator checks, vital signs, ECG, pulse oximetry and endoscopy.  No complications were witnessed. Minimal blood loss noted. Follow up chest x-ray ordered.    Procedure supervised by Dr. De Consent was obtained explaining the risks and benefits of the procedure. The patient was placed supine with a roll under shoulders to hyperextend the neck.  The FiO2 was increased to 1.0 and endotracheal tube was adjusted under fiberoptic guidance to the highest position safely possible.  The bronchoscope was introduced into the distal end of the endotracheal tube to monitor the procedure.  Tracheal landmarks were identified and marked.  The procedure site was prepped and draped and the patient sedated on a propofol drip. The overlying skin was anesthetized with 1% lidocaine with epinephrine and a thin gauge needle gradually inserted below the second tracheal ring in the midline until air was aspirated and it was visualized endoscopically.  A wire was then threaded into the trachea cephalad.  A small horizontal incision was made adjacent to the wire through the skin only.  The dilator was then introduced into the trachea.  Subsequently, a tracheostomy tube was inserted over the obturator and the position confirmed and the wire and obturator were removed and the cuff inflated. The ventilator circuit was then switched from the endotracheal tube to the tracheostomy. However volumes were not returned on the ventilator. Patient was reintubated. Tracheostomy attempted again and was successfully inserted.      The ventilator circuit was then switched from the endotracheal tube to the tracheostomy and adequate ventilation assured. The endotracheal tube cuff was then deflated and the endotracheal tube removed.  The entire procedure was monitored with ventilator checks, vital signs, ECG, pulse oximetry and endoscopy.  No complications were witnessed. Minimal blood loss noted. Follow up chest x-ray ordered.    Procedure supervised by Dr. De    Attending Note:  At bedside for the duration of the procedure. No immediate complications noted.

## 2018-03-14 NOTE — PROGRESS NOTE ADULT - ASSESSMENT
68 F PMH HTN, DM2, Breast Cancer (diagnosed in Feb 2017) currently on trastuzumab (last dose Jan 20th), originally presented 1/30 with fever found to have influenza subsequently went into PEA arrest x 2 , complicated by bilateral pneumothoraces with pneumomediastinum s/p 3 chest tubes, and also new ESRD requiring almost daily HD, readmitted to the MICU for hypoxic respiratory failure likely 2/2 aspiration pneumonia with MSSA bacteremia and pneumonia, patient re-intubated, likely for trach placement. BCX clear on 2/24. Intermittent low elevated temps--not persistent. Patient unchanged today, ? trach today. No signs breakthrough infection. Overall, MSSA bacteremia, pna, respiratory failure, intubated.  - Cefazolin post HD (2g, 2g, 3g) through 3/22  - If worsening fevers, cough, SOB, leukocytosis--likely broaden to cover aspiration pneumonia  - Aspiration precautions  - Will sign off. Please call with further questions or change in status.    Yossi Durant MD  Pager [phone]  After 5pm and on weekends call [phone]

## 2018-03-14 NOTE — PROGRESS NOTE ADULT - ASSESSMENT
68 year old female with HTN, DMT2, Breast Cancer (diagnosed in Feb 2017), originally presented on 1/30 with fever found to have influenza subsequently went into PEA arrest x 2 with ROSC, complicated by bilateral pneumothoraces with pneumomediastinum s/p 3 chest tubes, and new ESRD. Patient has had 2 readmissions to the MICU for hypoxic respiratory failure likely 2/2 aspiration pneumonia with intubations and MSSA PNA.   GI consulted for peg tube placement.     Impression:   - Dysphagia likely 2/2 severe illness requiring management in the ICU  - Hypoxic respiratory failure, currently ventilated   - ESRD on HD    Plan:   - trend CBC, CMP, INR  - plan for peg tube placement, on Thursday 3/15 (peg tube will be placed after the tracheostomy is done which is scheduled for 3/14)  - please ensure that peg tube procedure is done before the hemodialysis on Thurs   - keep NPO after midnight on Wednesday   - plan discussed with  and daughter, discussed risks and benefits of the procedure  - supportive care as per primary team    GI team will continue to follow.  Thank you for the consult.

## 2018-03-14 NOTE — PROGRESS NOTE ADULT - SUBJECTIVE AND OBJECTIVE BOX
CHIEF COMPLAINT: fever, total body weakness    INTERVAL EVENTS:   No events overnight, NPO for trach placement today    REVIEW OF SYSTEMS:  Constitutional: [ ] negative [ ] fevers [ ] chills [ ] weight loss [ ] weight gain  HEENT: [ ] negative [ ] dry eyes [ ] eye irritation [ ] postnasal drip [ ] nasal congestion  CV: [ ] negative  [ ] chest pain [ ] orthopnea [ ] palpitations [ ] murmur  Resp: [ ] negative [ ] cough [ ] shortness of breath [ ] dyspnea [ ] wheezing [ ] sputum [ ] hemoptysis  GI: [ ] negative [ ] nausea [ ] vomiting [ ] diarrhea [ ] constipation [ ] abd pain [ ] dysphagia   : [ ] negative [ ] dysuria [ ] nocturia [ ] hematuria [ ] increased urinary frequency  Musculoskeletal: [ ] negative [ ] back pain [ ] myalgias [ ] arthralgias [ ] fracture  Skin: [ ] negative [ ] rash [ ] itch  Neurological: [ ] negative [ ] headache [ ] dizziness [ ] syncope [ ] weakness [ ] numbness  Psychiatric: [ ] negative [ ] anxiety [ ] depression  Endocrine: [ ] negative [ ] diabetes [ ] thyroid problem  Hematologic/Lymphatic: [ ] negative [ ] anemia [ ] bleeding problem  Allergic/Immunologic: [ ] negative [ ] itchy eyes [ ] nasal discharge [ ] hives [ ] angioedema  [ ] All other systems negative  [x ] Unable to assess ROS because sedated + intubated        OBJECTIVE:    VITAL SIGNS:  ICU Vital Signs Last 24 Hrs  T(C): 37 (14 Mar 2018 04:00), Max: 37.4 (13 Mar 2018 12:00)  T(F): 98.6 (14 Mar 2018 04:00), Max: 99.3 (13 Mar 2018 12:00)  HR: 93 (14 Mar 2018 06:00) (78 - 103)  BP: 158/70 (14 Mar 2018 06:00) (128/60 - 173/73)  BP(mean): 100 (14 Mar 2018 06:00) (83 - 106)  ABP: --  ABP(mean): --  RR: 16 (14 Mar 2018 06:00) (13 - 29)  SpO2: 100% (14 Mar 2018 06:00) (96% - 100%)    Mode: AC/ CMV (Assist Control/ Continuous Mandatory Ventilation), RR (machine): 15, TV (machine): 400, FiO2: 30, PEEP: 5, ITime: 1, MAP: 9, PIP: 27    03-13 @ 07:01  -  03-14 @ 07:00  --------------------------------------------------------  IN: 575.2 mL / OUT: 0 mL / NET: 575.2 mL      CAPILLARY BLOOD GLUCOSE  142 (12 Mar 2018 11:00)      POCT Blood Glucose.: 123 mg/dL (14 Mar 2018 05:23)      PHYSICAL EXAM:    General: NAD, sedated, intubated  HEENT: NC/AT; PERRL, clear conjunctiva  Neck: supple  Respiratory: CTA b/l  Cardiovascular: +S1/S2; RRR  Abdomen: soft, NT/ND; +BS x4  Extremities: WWP, 2+ peripheral pulses b/l; no LE edema  Skin: normal color and turgor; no rash  Neurological: Sedated, intubated, no tremors, no rigidity    LINES: PIV, L subclav mediport, RIJ tunneled cath    MEDICATIONS:  MEDICATIONS  (STANDING):  ALBUTerol/ipratropium for Nebulization 3 milliLiter(s) Nebulizer every 6 hours  aspirin  chewable 81 milliGRAM(s) Oral daily  calamine Lotion 1 Application(s) Topical daily  ceFAZolin   IVPB 2000 milliGRAM(s) IV Intermittent <User Schedule>  ceFAZolin   IVPB 3000 milliGRAM(s) IV Intermittent <User Schedule>  dextrose 5%. 1000 milliLiter(s) (50 mL/Hr) IV Continuous <Continuous>  dextrose 5%. 1000 milliLiter(s) (50 mL/Hr) IV Continuous <Continuous>  dextrose 5%. 1000 milliLiter(s) (50 mL/Hr) IV Continuous <Continuous>  dextrose 50% Injectable 12.5 Gram(s) IV Push once  dextrose 50% Injectable 25 Gram(s) IV Push once  dextrose 50% Injectable 25 Gram(s) IV Push once  epoetin odalis Injectable 49074 Unit(s) SubCutaneous <User Schedule>  heparin  Injectable 5000 Unit(s) SubCutaneous every 8 hours  insulin lispro (HumaLOG) corrective regimen sliding scale   SubCutaneous every 6 hours  insulin NPH human recombinant 8 Unit(s) SubCutaneous <User Schedule>  nystatin Powder 1 Application(s) Topical two times a day  pantoprazole  Injectable 40 milliGRAM(s) IV Push daily  polyethylene glycol 3350 17 Gram(s) Oral daily  propofol Infusion 20 MICROgram(s)/kG/Min (8.976 mL/Hr) IV Continuous <Continuous>  senna Syrup 10 milliLiter(s) Oral at bedtime  sodium chloride 3%  Inhalation 3 milliLiter(s) Inhalation four times a day    MEDICATIONS  (PRN):  acetaminophen    Suspension. 650 milliGRAM(s) Oral every 6 hours PRN Mild Pain (1 - 3)  chlorhexidine 0.12% Liquid 15 milliLiter(s) Swish and Spit every 4 hours PRN mouth hygiene  dextrose Gel 1 Dose(s) Oral once PRN Blood Glucose LESS THAN 70 milliGRAM(s)/deciliter  glucagon  Injectable 1 milliGRAM(s) IntraMuscular once PRN Glucose LESS THAN 70 milligrams/deciliter  hydrocortisone 1% Ointment 1 Application(s) Topical every 8 hours PRN Rash and/or Itching  ondansetron Injectable 4 milliGRAM(s) IV Push every 6 hours PRN Nausea and/or Vomiting      ALLERGIES:  Allergies    doxycycline (Rash)  isoniazid (Rash)  NIFEdipine (Urticaria; Hives)  vitamin E (Short breath; Urticaria; Hives)    Intolerances        LABS:                        7.5    4.3   )-----------( 345      ( 13 Mar 2018 23:57 )             23.2     03-13    134<L>  |  94<L>  |  35<H>  ----------------------------<  172<H>  3.4<L>   |  25  |  2.56<H>    Ca    8.7      13 Mar 2018 23:56  Phos  5.8     03-13  Mg     2.2     03-13    TPro  6.7  /  Alb  2.4<L>  /  TBili  0.3  /  DBili  x   /  AST  21  /  ALT  <5<L>  /  AlkPhos  211<H>  03-13    PT/INR - ( 13 Mar 2018 23:56 )   PT: 10.6 sec;   INR: 0.98 ratio         PTT - ( 13 Mar 2018 23:56 )  PTT:27.5 sec      RADIOLOGY & ADDITIONAL TESTS: Reviewed.

## 2018-03-14 NOTE — PROGRESS NOTE ADULT - SUBJECTIVE AND OBJECTIVE BOX
[facility] Division of Kidney Diseases & Hypertension  FOLLOW UP NOTE  [phone]--------------------------------------------------------------------------------  Chief Complaint:Cardiac arrest      24 hour events/subjective:    No acute events noted  Last HD was on 3/12  Scheduled for trach today.     PAST HISTORY  --------------------------------------------------------------------------------  No significant changes to PMH, PSH, FHx, SHx, unless otherwise noted    ALLERGIES & MEDICATIONS  --------------------------------------------------------------------------------  Allergies    doxycycline (Rash)  isoniazid (Rash)  NIFEdipine (Urticaria; Hives)  vitamin E (Short breath; Urticaria; Hives)    Intolerances      Standing Inpatient Medications  ALBUTerol/ipratropium for Nebulization 3 milliLiter(s) Nebulizer every 6 hours  aspirin  chewable 81 milliGRAM(s) Oral daily  calamine Lotion 1 Application(s) Topical daily  ceFAZolin   IVPB 2000 milliGRAM(s) IV Intermittent <User Schedule>  ceFAZolin   IVPB 3000 milliGRAM(s) IV Intermittent <User Schedule>  dextrose 5%. 1000 milliLiter(s) IV Continuous <Continuous>  dextrose 5%. 1000 milliLiter(s) IV Continuous <Continuous>  dextrose 5%. 1000 milliLiter(s) IV Continuous <Continuous>  dextrose 50% Injectable 12.5 Gram(s) IV Push once  dextrose 50% Injectable 25 Gram(s) IV Push once  dextrose 50% Injectable 25 Gram(s) IV Push once  epoetin odalis Injectable 79014 Unit(s) SubCutaneous <User Schedule>  heparin  Injectable 5000 Unit(s) SubCutaneous every 8 hours  insulin lispro (HumaLOG) corrective regimen sliding scale   SubCutaneous every 6 hours  insulin NPH human recombinant 8 Unit(s) SubCutaneous <User Schedule>  nystatin Powder 1 Application(s) Topical two times a day  pantoprazole  Injectable 40 milliGRAM(s) IV Push daily  polyethylene glycol 3350 17 Gram(s) Oral daily  propofol Infusion 20 MICROgram(s)/kG/Min IV Continuous <Continuous>  senna Syrup 10 milliLiter(s) Oral at bedtime  sodium chloride 3%  Inhalation 3 milliLiter(s) Inhalation four times a day    PRN Inpatient Medications  acetaminophen    Suspension. 650 milliGRAM(s) Oral every 6 hours PRN  chlorhexidine 0.12% Liquid 15 milliLiter(s) Swish and Spit every 4 hours PRN  dextrose Gel 1 Dose(s) Oral once PRN  glucagon  Injectable 1 milliGRAM(s) IntraMuscular once PRN  hydrocortisone 1% Ointment 1 Application(s) Topical every 8 hours PRN  ondansetron Injectable 4 milliGRAM(s) IV Push every 6 hours PRN      REVIEW OF SYSTEMS  --------------------------------------------------------------------------------  Unable to obtain.     VITALS/PHYSICAL EXAM  --------------------------------------------------------------------------------  T(C): 37 (03-14-18 @ 04:00), Max: 37.4 (03-13-18 @ 12:00)  HR: 93 (03-14-18 @ 06:00) (80 - 103)  BP: 158/70 (03-14-18 @ 06:00) (128/60 - 173/73)  RR: 16 (03-14-18 @ 06:00) (13 - 29)  SpO2: 100% (03-14-18 @ 06:00) (96% - 100%)  Wt(kg): --        03-13-18 @ 07:01  -  03-14-18 @ 07:00  --------------------------------------------------------  IN: 575.2 mL / OUT: 0 mL / NET: 575.2 mL      Physical Exam:  	  Gen: Intubated, on mechanical ventilator   	HEENT: PERRL, supple neck, no jvp  	Pulm: CTA B/L  	CV: RRR, S1S2; no rub  	Back: No spinal or CVA tenderness; no sacral edema  	Abd: +BS, soft, nontender/nondistended  	: No suprapubic tenderness  	Ext: no edema  	Neuro: Sedated  	Skin: Warm, without rashes  	Vascular access: tunneled HD catheter present.     LABS/STUDIES  --------------------------------------------------------------------------------              7.5    4.3   >-----------<  345      [03-13-18 @ 23:57]              23.2     134  |  94  |  35  ----------------------------<  172      [03-13-18 @ 23:56]  3.4   |  25  |  2.56        Ca     8.7     [03-13-18 @ 23:56]      Mg     2.2     [03-13-18 @ 23:56]      Phos  5.8     [03-13-18 @ 23:56]    TPro  6.7  /  Alb  2.4  /  TBili  0.3  /  DBili  x   /  AST  21  /  ALT  <5  /  AlkPhos  211  [03-13-18 @ 23:56]    PT/INR: PT 10.6 , INR 0.98       [03-13-18 @ 23:56]  PTT: 27.5       [03-13-18 @ 23:56]      Creatinine Trend:  SCr 2.56 [03-13 @ 23:56]  SCr 1.80 [03-13 @ 00:42]  SCr 1.40 [03-12 @ 17:54]  SCr 3.47 [03-11 @ 23:57]  SCr 3.38 [03-11 @ 14:53]        Iron 32, TIBC 248, %sat 13      [03-07-18 @ 14:34]  Ferritin 174      [03-07-18 @ 14:34]

## 2018-03-14 NOTE — PROCEDURE NOTE - NSINFORMCONSENT_GEN_A_CORE
Benefits, risks, and possible complications of procedure explained to patient/caregiver who verbalized understanding and gave verbal consent.
This was an emergent procedure.
This was an emergent procedure.
Benefits, risks, and possible complications of procedure explained to patient/caregiver who verbalized understanding and gave written consent.
This was an emergent procedure.
Benefits, risks, and possible complications of procedure explained to patient/caregiver who verbalized understanding and gave written consent.
Benefits, risks, and possible complications of procedure explained to patient/caregiver who verbalized understanding and gave verbal consent.

## 2018-03-14 NOTE — PROGRESS NOTE ADULT - ASSESSMENT
68F PMH HTN, DMT2, Breast Cancer (diagnosed in Feb 2017) currently on trastuzumab (last dose Jan 20th), originally presented 1/30 with fever found to have influenza subsequently went into PEA arrest x 2 with ROSC, complicated by bilateral pneumothoraces with pneumomediastinum s/p 3 chest tubes, and new ESRD, readmitted to the MICU for hypoxic respiratory failure likely 2/2 aspiration pneumonia with MSSA PNA and bacteremia on 2/21, extubated on 3/1 with stable respiratory status, now readmitted to the MICU for hypoxic respiratory failure likely secondary to a recurrent aspiration event.      # Neuro:  - currently intubated and sedated  - on propofol gtt, titrate to RASS 0 to -2  - Known watershed ischemia of the occiput from low flow state during admission 2/2 flu    # CV:  - HD stable, off pressors since 3/11  - may consider adding back carvedilol 6.25 q 12 if patient hypternsive    # Pulm:  - reintubated for hypoxic respiratory failure (third intubation)  - plan for tracheostomy today  - c/w 3% saline with duonebs    # Renal:  - on HD 3x/ week- M,W,F  - s/p permacath placement by IR    # ID:    -concern for recurrent aspiration event  - if patient dale white count or fever- will start zosyn  - recent MSSA bacteremia with clearance of blood cultures- c/w ancef until March 24th (ancef dosed after dialysis)  - echo after negative blood cultures shows no signs of vegetation    # GI:  - NPO for trach placement today  - PEG placement plan for 3/15  - GI ppx with protonix 40mg daily  - senna, colace, miralax    # Endocrine:  - hold NPH when tube feeds on hold  - c/w sliding scale insulin    # Heme:  - Anemia, Hgb stable in 7s. Will continue to trend H/H, consider transfusion w/ HD for hemoglobin < 7.0.  - continue epo for chronic ESRD-related anemia  - Currently holding Herceptin     # DVT ppx:  - HSC 5000 units Q8 hours

## 2018-03-14 NOTE — PROGRESS NOTE ADULT - ASSESSMENT
67 y/o F with T2DM uncontrolled with neuropathy and ESRD now on HD on insulin, osteoporosis, HTN, here with acute respiratory failure 2/2 influenza s/p CVA and PEA arrest x 2, and MSSA bacteremia on month long tx for aspiration pna, re-intubated on 3.11.18 for hypoxia/respiratory distress now s/p Trach and awaiting PEG tube. Tube feeds currently off w/BG values at goal. Family deciding on plan for PEG tube. BG goal (140-180mg/dl).

## 2018-03-14 NOTE — PROCEDURE NOTE - NSINDICATIONS_GEN_A_CORE
pneumothorax
airway protection
pneumothorax/Current chest tube not working (dislodged)
critical illness/emergency venous access
critical illness

## 2018-03-14 NOTE — PROCEDURE NOTE - NSTRACHPOSTINTU_RESP_A_CORE
Chest excursion noted/confirmed with bronchoscope/Breath sounds bilateral/Appropriate capnography/Breath sounds equal/Positive end tidal Co2 noted

## 2018-03-14 NOTE — PROCEDURE NOTE - PROCEDURE DATE TIME, MLM
06-Feb-2018 11:19
14-Mar-2018 13:10
14-Mar-2018 13:12
08-Mar-2018 09:16
01-Feb-2018 17:11
02-Feb-2018 08:35

## 2018-03-15 LAB
ALBUMIN SERPL ELPH-MCNC: 2.5 G/DL — LOW (ref 3.3–5)
ALP SERPL-CCNC: 238 U/L — HIGH (ref 40–120)
ALT FLD-CCNC: <5 U/L RC — LOW (ref 10–45)
ANION GAP SERPL CALC-SCNC: 20 MMOL/L — HIGH (ref 5–17)
APTT BLD: 17.8 SEC — LOW (ref 27.5–37.4)
AST SERPL-CCNC: 18 U/L — SIGNIFICANT CHANGE UP (ref 10–40)
BILIRUB SERPL-MCNC: 0.3 MG/DL — SIGNIFICANT CHANGE UP (ref 0.2–1.2)
BLD GP AB SCN SERPL QL: NEGATIVE — SIGNIFICANT CHANGE UP
BUN SERPL-MCNC: 23 MG/DL — SIGNIFICANT CHANGE UP (ref 7–23)
CALCIUM SERPL-MCNC: 8.9 MG/DL — SIGNIFICANT CHANGE UP (ref 8.4–10.5)
CHLORIDE SERPL-SCNC: 93 MMOL/L — LOW (ref 96–108)
CO2 SERPL-SCNC: 22 MMOL/L — SIGNIFICANT CHANGE UP (ref 22–31)
CREAT SERPL-MCNC: 2.19 MG/DL — HIGH (ref 0.5–1.3)
GLUCOSE BLDC GLUCOMTR-MCNC: 107 MG/DL — HIGH (ref 70–99)
GLUCOSE BLDC GLUCOMTR-MCNC: 119 MG/DL — HIGH (ref 70–99)
GLUCOSE BLDC GLUCOMTR-MCNC: 165 MG/DL — HIGH (ref 70–99)
GLUCOSE SERPL-MCNC: 140 MG/DL — HIGH (ref 70–99)
HCT VFR BLD CALC: 25.7 % — LOW (ref 34.5–45)
HGB BLD-MCNC: 8.1 G/DL — LOW (ref 11.5–15.5)
INR BLD: 1.02 RATIO — SIGNIFICANT CHANGE UP (ref 0.88–1.16)
MAGNESIUM SERPL-MCNC: 2.1 MG/DL — SIGNIFICANT CHANGE UP (ref 1.6–2.6)
MCHC RBC-ENTMCNC: 30.2 PG — SIGNIFICANT CHANGE UP (ref 27–34)
MCHC RBC-ENTMCNC: 31.5 GM/DL — LOW (ref 32–36)
MCV RBC AUTO: 95.9 FL — SIGNIFICANT CHANGE UP (ref 80–100)
PHOSPHATE SERPL-MCNC: 4.4 MG/DL — SIGNIFICANT CHANGE UP (ref 2.5–4.5)
PLATELET # BLD AUTO: 291 K/UL — SIGNIFICANT CHANGE UP (ref 150–400)
POTASSIUM SERPL-MCNC: 3.4 MMOL/L — LOW (ref 3.5–5.3)
POTASSIUM SERPL-SCNC: 3.4 MMOL/L — LOW (ref 3.5–5.3)
PROT SERPL-MCNC: 6.7 G/DL — SIGNIFICANT CHANGE UP (ref 6–8.3)
PROTHROM AB SERPL-ACNC: 11 SEC — SIGNIFICANT CHANGE UP (ref 9.8–12.7)
RBC # BLD: 2.68 M/UL — LOW (ref 3.8–5.2)
RBC # FLD: 16 % — HIGH (ref 10.3–14.5)
RH IG SCN BLD-IMP: POSITIVE — SIGNIFICANT CHANGE UP
SODIUM SERPL-SCNC: 135 MMOL/L — SIGNIFICANT CHANGE UP (ref 135–145)
WBC # BLD: 7.1 K/UL — SIGNIFICANT CHANGE UP (ref 3.8–10.5)
WBC # FLD AUTO: 7.1 K/UL — SIGNIFICANT CHANGE UP (ref 3.8–10.5)

## 2018-03-15 PROCEDURE — 43246 EGD PLACE GASTROSTOMY TUBE: CPT | Mod: GC

## 2018-03-15 PROCEDURE — 99233 SBSQ HOSP IP/OBS HIGH 50: CPT | Mod: GC

## 2018-03-15 PROCEDURE — 99291 CRITICAL CARE FIRST HOUR: CPT

## 2018-03-15 PROCEDURE — 99232 SBSQ HOSP IP/OBS MODERATE 35: CPT

## 2018-03-15 RX ORDER — ACETAMINOPHEN 500 MG
1000 TABLET ORAL ONCE
Qty: 0 | Refills: 0 | Status: COMPLETED | OUTPATIENT
Start: 2018-03-15 | End: 2018-03-15

## 2018-03-15 RX ORDER — CARVEDILOL PHOSPHATE 80 MG/1
12.5 CAPSULE, EXTENDED RELEASE ORAL EVERY 12 HOURS
Qty: 0 | Refills: 0 | Status: DISCONTINUED | OUTPATIENT
Start: 2018-03-15 | End: 2018-03-16

## 2018-03-15 RX ORDER — MIDAZOLAM HYDROCHLORIDE 1 MG/ML
2 INJECTION, SOLUTION INTRAMUSCULAR; INTRAVENOUS ONCE
Qty: 0 | Refills: 0 | Status: DISCONTINUED | OUTPATIENT
Start: 2018-03-15 | End: 2018-03-15

## 2018-03-15 RX ADMIN — Medication 400 MILLIGRAM(S): at 00:44

## 2018-03-15 RX ADMIN — Medication 3 MILLILITER(S): at 05:43

## 2018-03-15 RX ADMIN — Medication 4: at 00:04

## 2018-03-15 RX ADMIN — PANTOPRAZOLE SODIUM 40 MILLIGRAM(S): 20 TABLET, DELAYED RELEASE ORAL at 12:29

## 2018-03-15 RX ADMIN — Medication 3 MILLILITER(S): at 11:46

## 2018-03-15 RX ADMIN — NYSTATIN CREAM 1 APPLICATION(S): 100000 CREAM TOPICAL at 18:09

## 2018-03-15 RX ADMIN — PROPOFOL 8.98 MICROGRAM(S)/KG/MIN: 10 INJECTION, EMULSION INTRAVENOUS at 05:03

## 2018-03-15 RX ADMIN — SODIUM CHLORIDE 3 MILLILITER(S): 9 INJECTION INTRAMUSCULAR; INTRAVENOUS; SUBCUTANEOUS at 05:43

## 2018-03-15 RX ADMIN — Medication 3 MILLILITER(S): at 00:04

## 2018-03-15 RX ADMIN — CALAMINE 8% AND ZINC OXIDE 8% 1 APPLICATION(S): 160 LOTION TOPICAL at 12:26

## 2018-03-15 RX ADMIN — SODIUM CHLORIDE 3 MILLILITER(S): 9 INJECTION INTRAMUSCULAR; INTRAVENOUS; SUBCUTANEOUS at 00:04

## 2018-03-15 RX ADMIN — SODIUM CHLORIDE 3 MILLILITER(S): 9 INJECTION INTRAMUSCULAR; INTRAVENOUS; SUBCUTANEOUS at 11:46

## 2018-03-15 RX ADMIN — NYSTATIN CREAM 1 APPLICATION(S): 100000 CREAM TOPICAL at 05:03

## 2018-03-15 RX ADMIN — MIDAZOLAM HYDROCHLORIDE 2 MILLIGRAM(S): 1 INJECTION, SOLUTION INTRAMUSCULAR; INTRAVENOUS at 23:07

## 2018-03-15 RX ADMIN — CARVEDILOL PHOSPHATE 12.5 MILLIGRAM(S): 80 CAPSULE, EXTENDED RELEASE ORAL at 21:19

## 2018-03-15 RX ADMIN — Medication 2: at 12:25

## 2018-03-15 NOTE — PROGRESS NOTE ADULT - ASSESSMENT
69 y/o F with T2DM uncontrolled with neuropathy and ESRD now on HD on insulin, osteoporosis, HTN, here with acute respiratory failure 2/2 influenza s/p CVA and PEA arrest x 2, and MSSA bacteremia on month long tx for aspiration pna, re-intubated on 3.11.18 for hypoxia/respiratory distress now s/p Trach and awaiting PEG tube. Tube feeds currently off w/BG values at goal. Continue to hold NPH until Tube Feeds restarted and titrated up to goal.  BG goal (140-180mg/dl)

## 2018-03-15 NOTE — PROGRESS NOTE ADULT - SUBJECTIVE AND OBJECTIVE BOX
CHIEF COMPLAINT: fever and fatigue    Interval Events: s/p trach yesterday, fever x1 T = 100.9 overnight, blood cultures drawn    REVIEW OF SYSTEMS:  Constitutional: [ ] negative [ ] fevers [ ] chills [ ] weight loss [ ] weight gain  HEENT: [ ] negative [ ] dry eyes [ ] eye irritation [ ] postnasal drip [ ] nasal congestion  CV: [ ] negative  [ ] chest pain [ ] orthopnea [ ] palpitations [ ] murmur  Resp: [ ] negative [ ] cough [ ] shortness of breath [ ] dyspnea [ ] wheezing [ ] sputum [ ] hemoptysis  GI: [ ] negative [ ] nausea [ ] vomiting [ ] diarrhea [ ] constipation [ ] abd pain [ ] dysphagia   : [ ] negative [ ] dysuria [ ] nocturia [ ] hematuria [ ] increased urinary frequency  Musculoskeletal: [ ] negative [ ] back pain [ ] myalgias [ ] arthralgias [ ] fracture  Skin: [ ] negative [ ] rash [ ] itch  Neurological: [ ] negative [ ] headache [ ] dizziness [ ] syncope [ ] weakness [ ] numbness  Psychiatric: [ ] negative [ ] anxiety [ ] depression  Endocrine: [ ] negative [ ] diabetes [ ] thyroid problem  Hematologic/Lymphatic: [ ] negative [ ] anemia [ ] bleeding problem  Allergic/Immunologic: [ ] negative [ ] itchy eyes [ ] nasal discharge [ ] hives [ ] angioedema  [ ] All other systems negative  [x] Unable to assess ROS because pt is intubated and sedated    OBJECTIVE:  ICU Vital Signs Last 24 Hrs  T(C): 37.6 (15 Mar 2018 08:00), Max: 38.3 (15 Mar 2018 00:00)  T(F): 99.7 (15 Mar 2018 08:00), Max: 100.9 (15 Mar 2018 00:00)  HR: 92 (15 Mar 2018 09:00) (75 - 106)  BP: 134/59 (15 Mar 2018 09:00) (102/53 - 174/78)  BP(mean): 85 (15 Mar 2018 09:00) (70 - 112)  ABP: --  ABP(mean): --  RR: 22 (15 Mar 2018 09:00) (13 - 32)  SpO2: 100% (15 Mar 2018 09:00) (98% - 100%)    Mode: AC/ CMV (Assist Control/ Continuous Mandatory Ventilation), RR (machine): 15, TV (machine): 400, FiO2: 30, PEEP: 5, ITime: 1, MAP: 8, PIP: 27    03-14 @ 07:01  -  03-15 @ 07:00  --------------------------------------------------------  IN: 1264.9 mL / OUT: 500 mL / NET: 764.9 mL    03-15 @ 07:01  -  03-15 @ 10:22  --------------------------------------------------------  IN: 39.3 mL / OUT: 0 mL / NET: 39.3 mL      CAPILLARY BLOOD GLUCOSE      POCT Blood Glucose.: 119 mg/dL (15 Mar 2018 05:09)      PHYSICAL EXAM:    General: NAD, sedated, intubated  HEENT: NC/AT; PERRL, clear conjunctiva  Neck: supple, trach site c/d/i  Respiratory: CTA b/l  Cardiovascular: +S1/S2; RRR  Abdomen: soft, NT/ND; +BS x4  Extremities: WWP, 2+ peripheral pulses b/l; no LE edema  Skin: normal color and turgor; no rash  Neurological: Sedated, intubated, no tremors, no rigidity    LINES: PIV, L subclav mediport, [facility] MEDICATIONS:  Standing Meds:  ALBUTerol/ipratropium for Nebulization 3 milliLiter(s) Nebulizer every 6 hours  aspirin  chewable 81 milliGRAM(s) Oral daily  calamine Lotion 1 Application(s) Topical daily  ceFAZolin   IVPB 2000 milliGRAM(s) IV Intermittent <User Schedule>  ceFAZolin   IVPB 3000 milliGRAM(s) IV Intermittent <User Schedule>  dextrose 5%. 1000 milliLiter(s) IV Continuous <Continuous>  dextrose 5%. 1000 milliLiter(s) IV Continuous <Continuous>  dextrose 5%. 1000 milliLiter(s) IV Continuous <Continuous>  dextrose 50% Injectable 12.5 Gram(s) IV Push once  dextrose 50% Injectable 25 Gram(s) IV Push once  dextrose 50% Injectable 25 Gram(s) IV Push once  epoetin odalis Injectable 25066 Unit(s) SubCutaneous <User Schedule>  insulin lispro (HumaLOG) corrective regimen sliding scale   SubCutaneous every 6 hours  insulin NPH human recombinant 8 Unit(s) SubCutaneous <User Schedule>  nystatin Powder 1 Application(s) Topical two times a day  pantoprazole  Injectable 40 milliGRAM(s) IV Push daily  polyethylene glycol 3350 17 Gram(s) Oral daily  propofol Infusion 20 MICROgram(s)/kG/Min IV Continuous <Continuous>  senna Syrup 10 milliLiter(s) Oral at bedtime  sodium chloride 3%  Inhalation 3 milliLiter(s) Inhalation four times a day      PRN Meds:  acetaminophen    Suspension. 650 milliGRAM(s) Oral every 6 hours PRN  chlorhexidine 0.12% Liquid 15 milliLiter(s) Swish and Spit every 4 hours PRN  dextrose Gel 1 Dose(s) Oral once PRN  glucagon  Injectable 1 milliGRAM(s) IntraMuscular once PRN  hydrocortisone 1% Ointment 1 Application(s) Topical every 8 hours PRN  ondansetron Injectable 4 milliGRAM(s) IV Push every 6 hours PRN      LABS:                        8.8    7.0   )-----------( 300      ( 14 Mar 2018 23:18 )             26.2     Hgb Trend: 8.8<--, 7.5<--, 7.5<--, 7.6<--, 7.0<--  03-14    134<L>  |  93<L>  |  15  ----------------------------<  212<H>  3.6   |  25  |  1.33<H>    Ca    8.6      14 Mar 2018 23:18  Phos  3.0     03-14  Mg     1.9     03-14    TPro  7.4  /  Alb  2.5<L>  /  TBili  0.3  /  DBili  x   /  AST  27  /  ALT  <5<L>  /  AlkPhos  260<H>  03-14    Creatinine Trend: 1.33<--, 2.56<--, 1.80<--, 1.40<--, 3.47<--, 3.38<--  PT/INR - ( 14 Mar 2018 23:18 )   PT: 10.1 sec;   INR: 0.93 ratio         PTT - ( 14 Mar 2018 23:18 )  PTT:25.7 sec    Arterial Blood Gas:  03-13 @ 11:45  7.44/38/150/26/100/2.1  ABG lactate: --        MICROBIOLOGY: pending    RADIOLOGY:  [x] Reviewed and interpreted by me    Assessment and Plan:   · Assessment		  68F PMH HTN, DMT2, Breast Cancer (diagnosed in Feb 2017) currently on trastuzumab (last dose Jan 20th), originally presented 1/30 with fever found to have influenza subsequently went into PEA arrest x 2 with ROSC, complicated by bilateral pneumothoraces with pneumomediastinum s/p 3 chest tubes, and new ESRD, readmitted to the MICU for hypoxic respiratory failure likely 2/2 aspiration pneumonia with MSSA PNA and bacteremia on 2/21, extubated on 3/1 with stable respiratory status, now readmitted to the MICU for hypoxic respiratory failure likely secondary to a recurrent aspiration event.    # Neuro:  - currently intubated and sedated  - on propofol gtt, wean after PEG today  - Known watershed ischemia of the occiput from low flow state during admission 2/2 flu    # CV:  - HD stable, off pressors since 3/11  - may consider adding back carvedilol 6.25 q 12 if patient hypertensive    # Pulm:  - s/p trach after 3rd intubation for recurrent aspirations  - CPAP following PEG  - c/w 3% saline with duonebs    # Renal:  - on HD 3x/ week- M,W,F  - s/p permacath placement by IR    # ID:    -concern for recurrent aspiration event  - fever overnight in setting of recent surgical procedure - f/u cultures, consider broadening abx if recurrent  - recent MSSA bacteremia with clearance of blood cultures- c/w ancef until March 24th (ancef dosed after dialysis)  - echo after negative blood cultures shows no signs of vegetation    # GI:  - NPO for PEG tube today  - GI ppx with protonix 40mg daily  - senna, colace, miralax    # Endocrine:  - hold NPH, SSI until tube feeds restarted    # Heme:  - Anemia, Hgb stable in 7s. Will continue to trend H/H, consider transfusion w/ HD for hemoglobin < 7.0.  - continue epo for chronic ESRD-related anemia  - Currently holding Herceptin     # DVT ppx:  - Hold subq heparin for PEG    Jonathan Weil, PGY1  [phone]

## 2018-03-15 NOTE — PROGRESS NOTE ADULT - SUBJECTIVE AND OBJECTIVE BOX
[facility] Division of Kidney Diseases & Hypertension  FOLLOW UP NOTE  [phone]--------------------------------------------------------------------------------  Chief Complaint:Cardiac arrest      24 hour events/subjective:    No acute events noted  Last HD was done yesterday.     PAST HISTORY  --------------------------------------------------------------------------------  No significant changes to PMH, PSH, FHx, SHx, unless otherwise noted    ALLERGIES & MEDICATIONS  --------------------------------------------------------------------------------  Allergies    doxycycline (Rash)  isoniazid (Rash)  NIFEdipine (Urticaria; Hives)  vitamin E (Short breath; Urticaria; Hives)    Intolerances      Standing Inpatient Medications  ALBUTerol/ipratropium for Nebulization 3 milliLiter(s) Nebulizer every 6 hours  aspirin  chewable 81 milliGRAM(s) Oral daily  calamine Lotion 1 Application(s) Topical daily  ceFAZolin   IVPB 2000 milliGRAM(s) IV Intermittent <User Schedule>  ceFAZolin   IVPB 3000 milliGRAM(s) IV Intermittent <User Schedule>  dextrose 5%. 1000 milliLiter(s) IV Continuous <Continuous>  dextrose 5%. 1000 milliLiter(s) IV Continuous <Continuous>  dextrose 5%. 1000 milliLiter(s) IV Continuous <Continuous>  dextrose 50% Injectable 12.5 Gram(s) IV Push once  dextrose 50% Injectable 25 Gram(s) IV Push once  dextrose 50% Injectable 25 Gram(s) IV Push once  epoetin odalis Injectable 70172 Unit(s) SubCutaneous <User Schedule>  insulin lispro (HumaLOG) corrective regimen sliding scale   SubCutaneous every 6 hours  insulin NPH human recombinant 8 Unit(s) SubCutaneous <User Schedule>  nystatin Powder 1 Application(s) Topical two times a day  pantoprazole  Injectable 40 milliGRAM(s) IV Push daily  polyethylene glycol 3350 17 Gram(s) Oral daily  propofol Infusion 20 MICROgram(s)/kG/Min IV Continuous <Continuous>  senna Syrup 10 milliLiter(s) Oral at bedtime  sodium chloride 3%  Inhalation 3 milliLiter(s) Inhalation four times a day    PRN Inpatient Medications  acetaminophen    Suspension. 650 milliGRAM(s) Oral every 6 hours PRN  chlorhexidine 0.12% Liquid 15 milliLiter(s) Swish and Spit every 4 hours PRN  dextrose Gel 1 Dose(s) Oral once PRN  glucagon  Injectable 1 milliGRAM(s) IntraMuscular once PRN  hydrocortisone 1% Ointment 1 Application(s) Topical every 8 hours PRN  ondansetron Injectable 4 milliGRAM(s) IV Push every 6 hours PRN      REVIEW OF SYSTEMS  --------------------------------------------------------------------------------  Unable to obtain.     VITALS/PHYSICAL EXAM  --------------------------------------------------------------------------------  T(C): 37.2 (03-15-18 @ 11:00), Max: 38.3 (03-15-18 @ 00:00)  HR: 87 (03-15-18 @ 13:00) (86 - 106)  BP: 150/63 (03-15-18 @ 13:00) (102/53 - 166/73)  RR: 17 (03-15-18 @ 13:00) (11 - 29)  SpO2: 100% (03-15-18 @ 13:00) (98% - 100%)  Wt(kg): --        03-14-18 @ 07:01  -  03-15-18 @ 07:00  --------------------------------------------------------  IN: 1264.9 mL / OUT: 500 mL / NET: 764.9 mL    03-15-18 @ 07:01  -  03-15-18 @ 13:47  --------------------------------------------------------  IN: 66.4 mL / OUT: 400 mL / NET: -333.6 mL      Physical Exam:  	  Gen: Intubated, on mechanical ventilator   	HEENT: PERRL, supple neck, no jvp  	Pulm: CTA B/L  	CV: RRR, S1S2; no rub  	Back: No spinal or CVA tenderness; no sacral edema  	Abd: +BS, soft, nontender/nondistended  	: No suprapubic tenderness  	Ext: no edema  	Neuro: Sedated  	Skin: Warm, without rashes  	Vascular access: tunneled HD catheter present.       LABS/STUDIES  --------------------------------------------------------------------------------              8.8    7.0   >-----------<  300      [03-14-18 @ 23:18]              26.2     134  |  93  |  15  ----------------------------<  212      [03-14-18 @ 23:18]  3.6   |  25  |  1.33        Ca     8.6     [03-14-18 @ 23:18]      Mg     1.9     [03-14-18 @ 23:18]      Phos  3.0     [03-14-18 @ 23:18]    TPro  7.4  /  Alb  2.5  /  TBili  0.3  /  DBili  x   /  AST  27  /  ALT  <5  /  AlkPhos  260  [03-14-18 @ 23:18]    PT/INR: PT 10.1 , INR 0.93       [03-14-18 @ 23:18]  PTT: 25.7       [03-14-18 @ 23:18]      Creatinine Trend:  SCr 1.33 [03-14 @ 23:18]  SCr 2.56 [03-13 @ 23:56]  SCr 1.80 [03-13 @ 00:42]  SCr 1.40 [03-12 @ 17:54]  SCr 3.47 [03-11 @ 23:57]

## 2018-03-15 NOTE — PROGRESS NOTE ADULT - ATTENDING COMMENTS
Agree with above. Seen and examined with residents. S/P tracheostomy yesterday. Awaiting PEG. PT/OT weaning trials.

## 2018-03-15 NOTE — PROGRESS NOTE ADULT - PROBLEM SELECTOR PLAN 1
from ATN in setting of cardiac arrest. Pt continues to be oligo-anuric, requiring dialysis. No evidence of renal recovery at present.  Patient s/p tunneled HD catheter placement  by IR . Last HD was done on 3/14. Labs reviewed from today; no plan for HD today. Monitor BMP daily.

## 2018-03-15 NOTE — PROGRESS NOTE ADULT - ATTENDING COMMENTS
#RUSS- oliguric  ATN-   no improvement in bun/cr; plan hd today; monitoring bladder scan in MICU' ; plan hd today for trach today as well  #access- had shiley- s/p permcath 3/9;   #for  trach per micu;    #anemia -epo tiw with HD;   check iron studies #RUSS- oliguric  ATN-   no improvement in bun/cr;  monitoring bladder scan in MICU' ; hd tomorrow   #anemia -epo tiw with HD;   check iron studies

## 2018-03-15 NOTE — PROGRESS NOTE ADULT - SUBJECTIVE AND OBJECTIVE BOX
Diabetes Follow up note:  Interval Hx:  67 y/o F with T2DM uncontrolled with neuropathy and ESRD now on HD on insulin, osteoporosis, HTN, here with acute respiratory failure 2/2 influenza s/p CVA and PEA arrest x 2, and MSSA bacteremia on month long tx for aspiration pna, re-intubated on 3.11.18 for hypoxia/respiratory distress s/p Trach now awaiting PEG placement today. Tube feeds on hold w/BG values mostly at goal on correction scale only. Pt sedated on propofol-RNs at bedside cleaning pt.     Review of Systems:  Unable to provide ROS. On sedation  MEDS:    insulin lispro (HumaLOG) corrective regimen sliding scale   SubCutaneous every 6 hours  insulin NPH human recombinant 8 Unit(s) SubCutaneous <User Schedule>    ceFAZolin   IVPB 2000 milliGRAM(s) IV Intermittent <User Schedule>  ceFAZolin   IVPB 3000 milliGRAM(s) IV Intermittent <User Schedule>    Allergies    doxycycline (Rash)  isoniazid (Rash)  NIFEdipine (Urticaria; Hives)  vitamin E (Short breath; Urticaria; Hives)          PE:  General: Female lying in bed. NAD.  Vital Signs Last 24 Hrs  T(C): 37.2 (15 Mar 2018 11:00), Max: 38.3 (15 Mar 2018 00:00)  T(F): 99 (15 Mar 2018 11:00), Max: 100.9 (15 Mar 2018 00:00)  HR: 87 (15 Mar 2018 13:00) (86 - 106)  BP: 150/63 (15 Mar 2018 13:00) (102/53 - 163/65)  BP(mean): 91 (15 Mar 2018 13:00) (70 - 104)  RR: 17 (15 Mar 2018 13:00) (11 - 29)  SpO2: 100% (15 Mar 2018 13:00) (98% - 100%)  Abd: Soft, NT,ND, NGT in place  CV: S1, S2. NSR on monitor  Extremities: Warm  Neuro: Sedated on propofol    LABS:    POCT Blood Glucose.: 165 mg/dL (03-15-18 @ 12:20)  POCT Blood Glucose.: 119 mg/dL (03-15-18 @ 05:09)  POCT Blood Glucose.: 211 mg/dL (03-14-18 @ 23:48)  POCT Blood Glucose.: 144 mg/dL (03-14-18 @ 17:44)  POCT Blood Glucose.: 124 mg/dL (03-14-18 @ 11:42)  POCT Blood Glucose.: 123 mg/dL (03-14-18 @ 05:23)  POCT Blood Glucose.: 127 mg/dL (03-13-18 @ 16:52)  POCT Blood Glucose.: 134 mg/dL (03-13-18 @ 11:02)  POCT Blood Glucose.: 104 mg/dL (03-13-18 @ 06:16)  POCT Blood Glucose.: 158 mg/dL (03-13-18 @ 00:43)  POCT Blood Glucose.: 113 mg/dL (03-12-18 @ 17:05)                            8.8    7.0   )-----------( 300      ( 14 Mar 2018 23:18 )             26.2       03-14    134<L>  |  93<L>  |  15  ----------------------------<  212<H>  3.6   |  25  |  1.33<H>    Ca    8.6      14 Mar 2018 23:18  Phos  3.0     03-14  Mg     1.9     03-14    TPro  7.4  /  Alb  2.5<L>  /  TBili  0.3  /  DBili  x   /  AST  27  /  ALT  <5<L>  /  AlkPhos  260<H>  03-14      Hemoglobin A1C, Whole Blood: 8.6 % <H> [4.0 - 5.6] (01-31-18 @ 07:15)  Hemoglobin A1C, Whole Blood: 8.7 % <H> [4.0 - 5.6] (01-31-18 @ 05:51)            Contact number: marcelino [phone] or [phone]

## 2018-03-15 NOTE — PROGRESS NOTE ADULT - PROBLEM SELECTOR PLAN 1
-test BG Q6h  -Hold NPH while Tube feeds off. Would restart at lower dose until at goal .   -c/w Humalog moderate correction scale Q6h  -IF BG >180 while NPO-can use low dose Lantus 10 units Daily  -discussed w/team  pager: [phone]

## 2018-03-16 LAB
GLUCOSE BLDC GLUCOMTR-MCNC: 123 MG/DL — HIGH (ref 70–99)
GLUCOSE BLDC GLUCOMTR-MCNC: 147 MG/DL — HIGH (ref 70–99)
GLUCOSE BLDC GLUCOMTR-MCNC: 170 MG/DL — HIGH (ref 70–99)
GLUCOSE BLDC GLUCOMTR-MCNC: 176 MG/DL — HIGH (ref 70–99)
GLUCOSE BLDC GLUCOMTR-MCNC: 180 MG/DL — HIGH (ref 70–99)

## 2018-03-16 PROCEDURE — 71045 X-RAY EXAM CHEST 1 VIEW: CPT | Mod: 26

## 2018-03-16 PROCEDURE — 99233 SBSQ HOSP IP/OBS HIGH 50: CPT | Mod: GC

## 2018-03-16 PROCEDURE — 99291 CRITICAL CARE FIRST HOUR: CPT

## 2018-03-16 PROCEDURE — 99232 SBSQ HOSP IP/OBS MODERATE 35: CPT

## 2018-03-16 PROCEDURE — 99231 SBSQ HOSP IP/OBS SF/LOW 25: CPT | Mod: GC

## 2018-03-16 RX ORDER — HEPARIN SODIUM 5000 [USP'U]/ML
5000 INJECTION INTRAVENOUS; SUBCUTANEOUS EVERY 8 HOURS
Qty: 0 | Refills: 0 | Status: DISCONTINUED | OUTPATIENT
Start: 2018-03-16 | End: 2018-03-30

## 2018-03-16 RX ORDER — POLYETHYLENE GLYCOL 3350 17 G/17G
17 POWDER, FOR SOLUTION ORAL DAILY
Qty: 0 | Refills: 0 | Status: DISCONTINUED | OUTPATIENT
Start: 2018-03-16 | End: 2018-03-18

## 2018-03-16 RX ORDER — ACETAMINOPHEN 500 MG
650 TABLET ORAL EVERY 6 HOURS
Qty: 0 | Refills: 0 | Status: DISCONTINUED | OUTPATIENT
Start: 2018-03-16 | End: 2018-03-30

## 2018-03-16 RX ORDER — HYDRALAZINE HCL 50 MG
10 TABLET ORAL ONCE
Qty: 0 | Refills: 0 | Status: COMPLETED | OUTPATIENT
Start: 2018-03-16 | End: 2018-03-16

## 2018-03-16 RX ORDER — LABETALOL HCL 100 MG
100 TABLET ORAL
Qty: 0 | Refills: 0 | Status: DISCONTINUED | OUTPATIENT
Start: 2018-03-16 | End: 2018-03-16

## 2018-03-16 RX ORDER — SENNA PLUS 8.6 MG/1
10 TABLET ORAL AT BEDTIME
Qty: 0 | Refills: 0 | Status: DISCONTINUED | OUTPATIENT
Start: 2018-03-16 | End: 2018-03-19

## 2018-03-16 RX ORDER — LABETALOL HCL 100 MG
100 TABLET ORAL
Qty: 0 | Refills: 0 | Status: DISCONTINUED | OUTPATIENT
Start: 2018-03-16 | End: 2018-03-28

## 2018-03-16 RX ORDER — POTASSIUM CHLORIDE 20 MEQ
20 PACKET (EA) ORAL ONCE
Qty: 0 | Refills: 0 | Status: DISCONTINUED | OUTPATIENT
Start: 2018-03-16 | End: 2018-03-16

## 2018-03-16 RX ORDER — POLYETHYLENE GLYCOL 3350 17 G/17G
17 POWDER, FOR SOLUTION ORAL DAILY
Qty: 0 | Refills: 0 | Status: DISCONTINUED | OUTPATIENT
Start: 2018-03-16 | End: 2018-03-16

## 2018-03-16 RX ADMIN — Medication 2: at 00:43

## 2018-03-16 RX ADMIN — Medication 10 MILLIGRAM(S): at 10:16

## 2018-03-16 RX ADMIN — ERYTHROPOIETIN 10000 UNIT(S): 10000 INJECTION, SOLUTION INTRAVENOUS; SUBCUTANEOUS at 13:15

## 2018-03-16 RX ADMIN — Medication 3 MILLILITER(S): at 11:11

## 2018-03-16 RX ADMIN — HEPARIN SODIUM 5000 UNIT(S): 5000 INJECTION INTRAVENOUS; SUBCUTANEOUS at 22:17

## 2018-03-16 RX ADMIN — SODIUM CHLORIDE 3 MILLILITER(S): 9 INJECTION INTRAMUSCULAR; INTRAVENOUS; SUBCUTANEOUS at 23:21

## 2018-03-16 RX ADMIN — NYSTATIN CREAM 1 APPLICATION(S): 100000 CREAM TOPICAL at 18:25

## 2018-03-16 RX ADMIN — PROPOFOL 8.98 MICROGRAM(S)/KG/MIN: 10 INJECTION, EMULSION INTRAVENOUS at 05:17

## 2018-03-16 RX ADMIN — CALAMINE 8% AND ZINC OXIDE 8% 1 APPLICATION(S): 160 LOTION TOPICAL at 11:11

## 2018-03-16 RX ADMIN — SODIUM CHLORIDE 3 MILLILITER(S): 9 INJECTION INTRAMUSCULAR; INTRAVENOUS; SUBCUTANEOUS at 05:36

## 2018-03-16 RX ADMIN — Medication 3 MILLILITER(S): at 23:13

## 2018-03-16 RX ADMIN — Medication 3 MILLILITER(S): at 17:05

## 2018-03-16 RX ADMIN — SODIUM CHLORIDE 3 MILLILITER(S): 9 INJECTION INTRAMUSCULAR; INTRAVENOUS; SUBCUTANEOUS at 17:09

## 2018-03-16 RX ADMIN — Medication 3 MILLILITER(S): at 05:35

## 2018-03-16 RX ADMIN — Medication 2: at 18:25

## 2018-03-16 RX ADMIN — Medication 200 MILLIGRAM(S): at 16:30

## 2018-03-16 RX ADMIN — Medication 81 MILLIGRAM(S): at 10:41

## 2018-03-16 RX ADMIN — SODIUM CHLORIDE 3 MILLILITER(S): 9 INJECTION INTRAMUSCULAR; INTRAVENOUS; SUBCUTANEOUS at 11:11

## 2018-03-16 RX ADMIN — NYSTATIN CREAM 1 APPLICATION(S): 100000 CREAM TOPICAL at 05:17

## 2018-03-16 RX ADMIN — SENNA PLUS 10 MILLILITER(S): 8.6 TABLET ORAL at 23:35

## 2018-03-16 RX ADMIN — Medication 100 MILLIGRAM(S): at 18:25

## 2018-03-16 RX ADMIN — Medication 2: at 11:28

## 2018-03-16 RX ADMIN — HEPARIN SODIUM 5000 UNIT(S): 5000 INJECTION INTRAVENOUS; SUBCUTANEOUS at 13:34

## 2018-03-16 RX ADMIN — Medication 100 MILLIGRAM(S): at 11:09

## 2018-03-16 RX ADMIN — SODIUM CHLORIDE 3 MILLILITER(S): 9 INJECTION INTRAMUSCULAR; INTRAVENOUS; SUBCUTANEOUS at 00:28

## 2018-03-16 RX ADMIN — Medication 3 MILLILITER(S): at 00:26

## 2018-03-16 NOTE — PROGRESS NOTE ADULT - PROBLEM SELECTOR PLAN 2
Spoke with pt and relayed message below.   Hb not at goal. Will increase epogen to 8000 units TIW with HD. Hb not at goal. Will increase epogen to 24073 units TIW with HD. Monitor Hb. Hb not at goal. Will increase epogen to 92008 units TIW with HD. Check serum ferritin and iron studies. Monitor Hb.

## 2018-03-16 NOTE — PROGRESS NOTE ADULT - ATTENDING COMMENTS
#RUSS- oliguric  ATN-   no improvement in bun/cr;  monitoring bladder scan in MICU' ; hd tomorrow   #anemia -epo tiw with HD;   check iron studies #RUSS- oliguric  ATN-   no improvement in renal function  monitoring bladder scan in MICU' ; hd today  #anemia -epo tiw with HD;   check iron studies #RUSS- oliguric  ATN-   no improvement in renal function  monitoring bladder scan in MICU' ; hd today  #access- permcath  #anemia -epo tiw with HD;   check iron studies

## 2018-03-16 NOTE — CHART NOTE - NSCHARTNOTEFT_GEN_A_CORE
MICU Transfer Note    Transfer from: MICU    Transfer to: (  ) Medicine    (  ) Telemetry     (x) RCU        (    ) Palliative         (   ) Stroke Unit          (   ) __________________    Accepting Physician:  Signout given to:     MICU COURSE:  68F PMH HTN, DMT2, Breast Cancer (diagnosed in Feb 2017) currently on trastuzumab (last dose Jan 20th), originally presented 1/30 with fever found to have influenza subsequently went into PEA arrest x 2 with ROSC, complicated by bilateral pneumothoraces with pneumomediastinum s/p 3 chest tubes, and new ESRD, readmitted to the MICU for hypoxic respiratory failure likely 2/2 aspiration pneumonia with MSSA PNA and bacteremia on 2/21, extubated on 3/1 with stable respiratory status, subsequently readmitted to the MICU on 3/11/18 for hypoxic respiratory failure likely secondary to a recurrent aspiration event. She was reintubated and initially required vasopressors for hypotension. Her hemodynamics stabilized and she quickly came off the pressors. She did not manifest any signs of infection during this MICU stay. She is now s/p trach and PEG.      ASSESSMENT & PLAN:     # Neuro:  - off sedation  - Known watershed ischemia of the occiput from low flow state during admission 2/2 flu  - continue ASA    # CV:  - HD stable, no active issues  - Restarted carvedilol    # Pulm:  - s/p trach after 3rd intubation for recurrent aspirations  - CPAP/trach collar  - prn suction  - c/w 3% saline with duonebs    # Renal:  - on HD 3x/ week- M,W,F  - s/p permacath placement by IR    # ID:    -concern for recurrent aspiration event  - fever shortly after trach 2 days ago, no fever since - no clinical s/s of infection at this time. F/u cultures  - recent MSSA bacteremia with clearance of blood cultures- c/w ancef until March 24th (ancef dosed after dialysis)  - echo after negative blood cultures shows no signs of vegetation    # GI:  - s/p PEG tube  - restart feeds  - senna, colace, miralax    # Endocrine:  - Restart NPH and SSI w/ feeds    # Heme:  - Anemia, Hgb stable in 7s. Will continue to trend H/H, consider transfusion w/ HD for hemoglobin < 7.0.  - continue epo for chronic ESRD-related anemia    # DVT ppx:  - Restart sub-q heparin        FOR FOLLOW UP:  -Complete course of ancef - through 3/24/18 for MSSA bacteremia MICU Transfer Note    Transfer from: MICU    Transfer to: (  ) Medicine    (  ) Telemetry     ( ) RCU        (    ) Palliative         (   ) Stroke Unit          (   ) __________________    Accepting Physician:  Signout given to:     MICU COURSE:  68F PMH HTN, DMT2, Breast Cancer (diagnosed in Feb 2017) currently on trastuzumab (last dose Jan 20th), originally presented 1/30 with fever found to have influenza subsequently went into PEA arrest x 2 with ROSC, complicated by bilateral pneumothoraces with pneumomediastinum s/p 3 chest tubes, and new ESRD, readmitted to the MICU for hypoxic respiratory failure likely 2/2 aspiration pneumonia with MSSA PNA and bacteremia on 2/21, extubated on 3/1 with stable respiratory status, subsequently readmitted to the MICU on 3/11/18 for hypoxic respiratory failure likely secondary to a recurrent aspiration event. She was reintubated and initially required vasopressors for hypotension. Her hemodynamics stabilized and she quickly came off the pressors. She did not manifest any signs of infection during this MICU stay. She is now s/p trach and PEG.      ASSESSMENT & PLAN:     # Neuro:  - off sedation  - Known watershed ischemia of the occiput from low flow state during admission 2/2 flu    # CV:  - HD stable, off pressors since 3/11  - Still hypertensive despite carvedilol. Will convert to labetalol    # Pulm:  - s/p trach after 3rd intubation for recurrent aspirations  - tolerating CPAP, convert to trach collar during the today  - CPAP at night only  - prn suction  - c/w 3% saline with duonebs    # Renal:  - on HD 3x/ week- M,W,F  - s/p permacath placement by IR  - not voiding spontaneously, has required intermittent straight cath  - bladder scan to assess need for repeat straight cath today    # ID:    -concern for recurrent aspiration event  - fever shortly after trach 2 days ago, no fever since - f/u cultures, consider broadening abx if recurrent  - recent MSSA bacteremia with clearance of blood cultures- c/w ancef until March 24th (ancef dosed after dialysis)  - echo after negative blood cultures shows no signs of vegetation    # GI:  - s/p PEG tube yesterday  - restart feeds today per GI  - DC protonix  - senna, colace, miralax  - will need swallow study    # Endocrine:  - Restart NPH and SSI w/ feeds    # Heme:  - Anemia, Hgb stable in 7s. Will continue to trend H/H, consider transfusion w/ HD for hemoglobin < 7.0.  - continue epo for chronic ESRD-related anemia    # DVT ppx:  - Restart sub-q heparin    - re-consult PT/OT      FOR FOLLOW UP:  - Complete course of ancef - through 3/24/18 for MSSA bacteremia  - PT/OT MICU Transfer Note    Transfer from: MICU    Transfer to: (  ) Medicine    (  ) Telemetry     (x) RCU        (    ) Palliative         (   ) Stroke Unit          (   ) __________________    Accepting Physician: Dr. Ortiz  Signout given to:     MICU COURSE:  68F PMH HTN, DMT2, Breast Cancer (diagnosed in Feb 2017) currently on trastuzumab (last dose Jan 20th), originally presented 1/30 with fever found to have influenza subsequently went into PEA arrest x 2 with ROSC, complicated by bilateral pneumothoraces with pneumomediastinum s/p 3 chest tubes, and new ESRD, readmitted to the MICU for hypoxic respiratory failure likely 2/2 aspiration pneumonia with MSSA PNA and bacteremia on 2/21, extubated on 3/1 with stable respiratory status, subsequently readmitted to the MICU on 3/11/18 for hypoxic respiratory failure likely secondary to a recurrent aspiration event. She was reintubated and initially required vasopressors for hypotension. Her hemodynamics stabilized and she quickly came off the pressors. She did not manifest any signs of infection during this MICU stay. She is now s/p trach and PEG.      ASSESSMENT & PLAN:     # Neuro:  - off sedation  - Known watershed ischemia of the occiput from low flow state during admission 2/2 flu    # CV:  - HD stable, off pressors since 3/11  - Still hypertensive despite carvedilol. Will convert to labetalol    # Pulm:  - s/p trach after 3rd intubation for recurrent aspirations  - tolerating CPAP, convert to trach collar during the today  - CPAP at night only  - prn suction  - c/w 3% saline with duonebs    # Renal:  - on HD 3x/ week- M,W,F  - s/p permacath placement by IR  - not voiding spontaneously, has required intermittent straight cath  - bladder scan to assess need for repeat straight cath today    # ID:    -concern for recurrent aspiration event  - fever shortly after trach 2 days ago, no fever since - f/u cultures, consider broadening abx if recurrent  - recent MSSA bacteremia with clearance of blood cultures- c/w ancef until March 24th (ancef dosed after dialysis)  - echo after negative blood cultures shows no signs of vegetation    # GI:  - s/p PEG tube yesterday  - restart feeds today per GI  - DC protonix  - senna, colace, miralax  - will need swallow study    # Endocrine:  - Restart NPH and SSI w/ feeds    # Heme:  - Anemia, Hgb stable in 7s. Will continue to trend H/H, consider transfusion w/ HD for hemoglobin < 7.0.  - continue epo for chronic ESRD-related anemia    # DVT ppx:  - Restart sub-q heparin    - re-consult PT/OT      FOR FOLLOW UP:  - Complete course of ancef - through 3/24/18 for MSSA bacteremia  - PT/OT MICU Transfer Note    Transfer from: MICU    Transfer to: (  ) Medicine    (  ) Telemetry     (x) RCU        (    ) Palliative         (   ) Stroke Unit          (   ) __________________    Accepting Physician: Dr. Ortiz  Signout given to: RCU NP    MICU COURSE:  68F PMH HTN, DMT2, Breast Cancer (diagnosed in Feb 2017) currently on trastuzumab (last dose Jan 20th), originally presented 1/30 with fever found to have influenza subsequently went into PEA arrest x 2 with ROSC, complicated by bilateral pneumothoraces with pneumomediastinum s/p 3 chest tubes, and new ESRD, readmitted to the MICU for hypoxic respiratory failure likely 2/2 aspiration pneumonia with MSSA PNA and bacteremia on 2/21, extubated on 3/1 with stable respiratory status, subsequently readmitted to the MICU on 3/11/18 for hypoxic respiratory failure likely secondary to a recurrent aspiration event. She was reintubated and initially required vasopressors for hypotension. Her hemodynamics stabilized and she quickly came off the pressors. She did not manifest any signs of infection during this MICU stay. She is now s/p trach and PEG.      ASSESSMENT & PLAN:     # Neuro:  - off sedation  - Known watershed ischemia of the occiput from low flow state during admission 2/2 flu    # CV:  - HD stable, off pressors since 3/11  - Still hypertensive despite carvedilol. Will convert to labetalol    # Pulm:  - s/p trach after 3rd intubation for recurrent aspirations  - tolerating CPAP, convert to trach collar during the today  - CPAP at night only  - prn suction  - c/w 3% saline with duonebs    # Renal:  - on HD 3x/ week- M,W,F  - s/p permacath placement by IR  - not voiding spontaneously, has required intermittent straight cath  - bladder scan to assess need for repeat straight cath today    # ID:    -concern for recurrent aspiration event  - fever shortly after trach 2 days ago, no fever since - f/u cultures, consider broadening abx if recurrent  - recent MSSA bacteremia with clearance of blood cultures- c/w ancef until March 24th (ancef dosed after dialysis)  - echo after negative blood cultures shows no signs of vegetation    # GI:  - s/p PEG tube yesterday  - restart feeds today per GI  - DC protonix  - senna, colace, miralax  - will need swallow study    # Endocrine:  - Restart NPH and SSI w/ feeds    # Heme:  - Anemia, Hgb stable in 7s. Will continue to trend H/H, consider transfusion w/ HD for hemoglobin < 7.0.  - continue epo for chronic ESRD-related anemia    # DVT ppx:  - Restart sub-q heparin    - re-consult PT/OT      FOR FOLLOW UP:  - Trach collar during day, CPAP at night  - Complete course of ancef - through 3/24/18 for MSSA bacteremia  - Assess for urinary retention, straight cath prn  - PT/OT

## 2018-03-16 NOTE — PROGRESS NOTE ADULT - ATTENDING COMMENTS
Agree with above. Seen and examined with residents. S/P trach and PEG. Trach collar today, PT/OT close follow up.

## 2018-03-16 NOTE — PROGRESS NOTE ADULT - SUBJECTIVE AND OBJECTIVE BOX
Chief Complaint: Type 2 DM    History: tube feed running at 20cc/hr.  She is more awake today.  Responding to 's questions.      MEDICATIONS  (STANDING):  insulin lispro (HumaLOG) corrective regimen sliding scale   SubCutaneous every 6 hours    MEDICATIONS  (PRN):  acetaminophen    Suspension. 650 milliGRAM(s) Oral every 6 hours PRN Mild Pain (1 - 3)  chlorhexidine 0.12% Liquid 15 milliLiter(s) Swish and Spit every 4 hours PRN mouth hygiene  dextrose Gel 1 Dose(s) Oral once PRN Blood Glucose LESS THAN 70 milliGRAM(s)/deciliter  glucagon  Injectable 1 milliGRAM(s) IntraMuscular once PRN Glucose LESS THAN 70 milligrams/deciliter  hydrocortisone 1% Ointment 1 Application(s) Topical every 8 hours PRN Rash and/or Itching  ondansetron Injectable 4 milliGRAM(s) IV Push every 6 hours PRN Nausea and/or Vomiting      Allergies    doxycycline (Rash)  isoniazid (Rash)  NIFEdipine (Urticaria; Hives)  vitamin E (Short breath; Urticaria; Hives)    Review of Systems:  Constitutional: no acute distress  Eyes: No blurry vision  Neuro: No tremors  HEENT: No pain  Cardiovascular: No chest pain, palpitations  Respiratory: No SOB, no cough  : some abdominal distention.      ALL OTHER SYSTEMS REVIEWED AND NEGATIVE      PHYSICAL EXAM:  VITALS: T(C): 36.9 (03-16-18 @ 12:15)  T(F): 98.4 (03-16-18 @ 12:15), Max: 99.8 (03-16-18 @ 11:00)  HR: 96 (03-16-18 @ 12:15) (72 - 97)  BP: 150/63 (03-16-18 @ 12:15) (102/44 - 184/78)  RR:  (11 - 46)  SpO2:  (100% - 100%)  Wt(kg): --  GENERAL: NAD, well-groomed, well-developed  HEENT:  Atraumatic, Normocephalic, moist mucous membranes  CARDIOVASCULAR: Regular rate and rhythm; No murmurs; no peripheral edema  GI: Soft, nontender, non distended, normal bowel sounds  SKIN: Dry, intact, No rashes or lesions  MUSCULOSKELETAL: Full range of motion, normal strength  PSYCH: Alert    POCT Blood Glucose.: 176 mg/dL (03-16-18 @ 11:26)  POCT Blood Glucose.: 123 mg/dL (03-16-18 @ 05:12)  POCT Blood Glucose.: 170 mg/dL (03-16-18 @ 00:41)  POCT Blood Glucose.: 107 mg/dL (03-15-18 @ 17:52)  POCT Blood Glucose.: 165 mg/dL (03-15-18 @ 12:20)  POCT Blood Glucose.: 119 mg/dL (03-15-18 @ 05:09)  POCT Blood Glucose.: 211 mg/dL (03-14-18 @ 23:48)  POCT Blood Glucose.: 144 mg/dL (03-14-18 @ 17:44)  POCT Blood Glucose.: 124 mg/dL (03-14-18 @ 11:42)  POCT Blood Glucose.: 123 mg/dL (03-14-18 @ 05:23)  POCT Blood Glucose.: 127 mg/dL (03-13-18 @ 16:52)      03-15    135  |  93<L>  |  23  ----------------------------<  140<H>  3.4<L>   |  22  |  2.19<H>    EGFR if : 26<L>  EGFR if non : 22<L>    Ca    8.9      03-15  Mg     2.1     03-15  Phos  4.4     03-15    TPro  6.7  /  Alb  2.5<L>  /  TBili  0.3  /  DBili  x   /  AST  18  /  ALT  <5<L>  /  AlkPhos  238<H>  03-15          Thyroid Function Tests:      Hemoglobin A1C, Whole Blood: 8.6 % <H> [4.0 - 5.6] (01-31-18 @ 07:15)  Hemoglobin A1C, Whole Blood: 8.7 % <H> [4.0 - 5.6] (01-31-18 @ 05:51)

## 2018-03-16 NOTE — PROGRESS NOTE ADULT - SUBJECTIVE AND OBJECTIVE BOX
Chief Complaint:  Patient is a 68y old  Female who presents with a chief complaint of Fever, total body weakness (2018 13:44)      Interval Events: s/p endoscopic PEG placement yesterday. Afebrile.     Allergies:  doxycycline (Rash)  isoniazid (Rash)  NIFEdipine (Urticaria; Hives)  vitamin E (Short breath; Urticaria; Hives)      Home Medications:    Hospital Medications:  acetaminophen    Suspension. 650 milliGRAM(s) Oral every 6 hours PRN  ALBUTerol/ipratropium for Nebulization 3 milliLiter(s) Nebulizer every 6 hours  aspirin  chewable 81 milliGRAM(s) Oral daily  calamine Lotion 1 Application(s) Topical daily  carvedilol 12.5 milliGRAM(s) Oral every 12 hours  ceFAZolin   IVPB 2000 milliGRAM(s) IV Intermittent <User Schedule>  ceFAZolin   IVPB 3000 milliGRAM(s) IV Intermittent <User Schedule>  chlorhexidine 0.12% Liquid 15 milliLiter(s) Swish and Spit every 4 hours PRN  dextrose 5%. 1000 milliLiter(s) IV Continuous <Continuous>  dextrose 5%. 1000 milliLiter(s) IV Continuous <Continuous>  dextrose 5%. 1000 milliLiter(s) IV Continuous <Continuous>  dextrose 50% Injectable 12.5 Gram(s) IV Push once  dextrose 50% Injectable 25 Gram(s) IV Push once  dextrose 50% Injectable 25 Gram(s) IV Push once  dextrose Gel 1 Dose(s) Oral once PRN  epoetin odalis Injectable 59954 Unit(s) SubCutaneous <User Schedule>  glucagon  Injectable 1 milliGRAM(s) IntraMuscular once PRN  hydrocortisone 1% Ointment 1 Application(s) Topical every 8 hours PRN  insulin lispro (HumaLOG) corrective regimen sliding scale   SubCutaneous every 6 hours  insulin NPH human recombinant 8 Unit(s) SubCutaneous <User Schedule>  nystatin Powder 1 Application(s) Topical two times a day  ondansetron Injectable 4 milliGRAM(s) IV Push every 6 hours PRN  pantoprazole  Injectable 40 milliGRAM(s) IV Push daily  polyethylene glycol 3350 17 Gram(s) Oral daily  propofol Infusion 20 MICROgram(s)/kG/Min IV Continuous <Continuous>  senna Syrup 10 milliLiter(s) Oral at bedtime  sodium chloride 3%  Inhalation 3 milliLiter(s) Inhalation four times a day      PMHX/PSHX:  Diabetes  Breast CA  H/O mastectomy, bilateral  No significant past surgical history      Family history:  No pertinent family history in first degree relatives      ROS: unable to obtain     PHYSICAL EXAM:   Vital Signs:  Vital Signs Last 24 Hrs  T(C): 36.5 (16 Mar 2018 07:00), Max: 37.6 (16 Mar 2018 00:00)  T(F): 97.7 (16 Mar 2018 07:00), Max: 99.7 (16 Mar 2018 00:00)  HR: 82 (16 Mar 2018 08:13) (72 - 92)  BP: 147/64 (16 Mar 2018 08:00) (102/44 - 171/70)  BP(mean): 92 (16 Mar 2018 08:00) (64 - 107)  RR: 17 (16 Mar 2018 08:00) (11 - 46)  SpO2: 100% (16 Mar 2018 08:13) (100% - 100%)  Daily     Daily Weight in k.1 (16 Mar 2018 04:00)    GENERAL:  NAD  HEENT:  trach  CHEST: , vent sounds+  HEART:  Regular rhythm, S1, S2  ABDOMEN:  Soft, non-tender, non-distended, normoactive bowel sounds, PEG  - c/d/i, bumper at 4   EXTREMITIES:  no edema  SKIN:  No rash  NEURO:  sedated       LABS:                        8.1    7.1   )-----------( 291      ( 15 Mar 2018 22:51 )             25.7     03-15    135  |  93<L>  |  23  ----------------------------<  140<H>  3.4<L>   |  22  |  2.19<H>    Ca    8.9      15 Mar 2018 22:51  Phos  4.4     03-15  Mg     2.1     03-15    TPro  6.7  /  Alb  2.5<L>  /  TBili  0.3  /  DBili  x   /  AST  18  /  ALT  <5<L>  /  AlkPhos  238<H>  03-15    LIVER FUNCTIONS - ( 15 Mar 2018 22:51 )  Alb: 2.5 g/dL / Pro: 6.7 g/dL / ALK PHOS: 238 U/L / ALT: <5 U/L RC / AST: 18 U/L / GGT: x           PT/INR - ( 15 Mar 2018 23:04 )   PT: 11.0 sec;   INR: 1.02 ratio         PTT - ( 15 Mar 2018 23:04 )  PTT:17.8 sec        Imaging:        [facility]  ____________________________________________________________________________________________________  Patient Name: Jessica Barraza                   MRN: [id number]  Account Number: [id number]                     YOB: 1950  Room: Endoscopy Room 1                           Gender: Female  Attending MD: Reed Morales MD                    Procedure Date No Time: 3/15/2018  ____________________________________________________________________________________________________     Procedure:           Upper GI endoscopy  Indications:         Dysphagia  Providers:           Reed Morales MD, Teodoro Griffin MD (Fellow), Aashish Cunningham MD (Chun Kit) (Fellow)  Referring MD:        Mustapha Vences MD  Medicines:           on propofol gtt, on cafezolin  Complications:       No immediate complications.  ____________________________________________________________________________________________________  Procedure:           Pre-Anesthesia Assessment:                       - Prior to the procedure, a History and Physical was performed, and patient                        medications, allergies and sensitivities were reviewed. The patient's                        tolerance of previous anesthesia was reviewed.                       - The risks and benefits of the procedure and the sedation options and risks                        were discussed with the patient. All questions were answered and informed                        consent was obtained.                       - Patient identification and proposed procedure were verified prior to the                        procedure by the physician. The procedure was verified in the endoscopy suite.                       After obtaining informed consent, the endoscope was passed under direct                        vision. Throughout the procedure, the patient's blood pressure, pulse, and                        oxygen saturations were monitored continuously. The Endoscope was introduced                        through the mouth, and advanced to the second part of duodenum. The upper GI                        endoscopy was accomplished without difficulty. The patient tolerated the                        procedure well.                                                                                                        Findings:       The examined esophagus was normal.       The entire examined stomach was normal.       The duodenal bulb and 2nd part of the duodenum were normal.       The patient was placed in the supine position for PEG placement. The stomach was insufflated        to appose gastric and abdominal walls. A site was located in the body of the stomach with        excellent transillumination for placement. The abdominal wall was marked and prepped in a        sterile manner. The area was anesthetized with 4 mL of 0.5% lidocaine. The trocar needle was        introduced through the abdominal wall and into the stomach under direct endoscopic view. A        snare was introduced through the endoscope and opened in the gastric lumen. The guide wire        was passed through the trocar and into the open snare. The snare was closed around the guide        wire. The endoscope and snare were removed, pulling the wire out through the mouth. A skin        incision was made at the site of needle insertion. The externally removable 20 Fr EndoVive        Safety gastrostomy tube was lubricated. The G-tube was tied to the guide wire and pulled        through the mouth and into the stomach. The trocar needle was removed, and the gastrostomy        tube was pulled out from the stomach through the skin. The external bumper was attached to        the gastrostomy tube, and the tube was cut to remove the guide wire. The final position of        the gastrostomy tube was confirmed by relook endoscopy, and skin marking noted to be 4 cm at        the external bumper. The final tension and compression of the abdominal wall by the PEG tube        and external bumper were checked and revealed that the bumper was loose and lightly touching        the skin. The feeding tube was capped, and the tube site cleaned and dressed.                                                                                                        Impression:          - Normal esophagus.                       - Normal stomach.                       - Normal duodenal bulb and 2nd part of the duodenum.                       - An externally removable 20 Fr PEG placement was successfully completed.  Recommendation:      - Return patient to ICU for ongoing care.                       - Please follow the post-PEG recommendations including: antibiotic ointment                        to site, change dressing once per day, may use PEG today for meds and water                        and may use PEG tomorrow for feedings.                                                                                                        Attending Participation:       I was present and participated during the entire procedure, including non-key portions.                                                                                                          ______________  Reed Morales MD  3/15/2018 6:48:19 PM  Number of Addenda: 0    Note Initiated On: 3/15/2018 5:39 PM

## 2018-03-16 NOTE — PROGRESS NOTE ADULT - SUBJECTIVE AND OBJECTIVE BOX
CHIEF COMPLAINT:    Interval Events: asymptomatic hypertension O/N, restarted carvedilol w/ improvement    REVIEW OF SYSTEMS:  Constitutional: [ ] negative [ ] fevers [ ] chills [ ] weight loss [ ] weight gain  HEENT: [ ] negative [ ] dry eyes [ ] eye irritation [ ] postnasal drip [ ] nasal congestion  CV: [ ] negative  [ ] chest pain [ ] orthopnea [ ] palpitations [ ] murmur  Resp: [ ] negative [ ] cough [ ] shortness of breath [ ] dyspnea [ ] wheezing [ ] sputum [ ] hemoptysis  GI: [ ] negative [ ] nausea [ ] vomiting [ ] diarrhea [ ] constipation [ ] abd pain [ ] dysphagia   : [ ] negative [ ] dysuria [ ] nocturia [ ] hematuria [ ] increased urinary frequency  Musculoskeletal: [ ] negative [ ] back pain [ ] myalgias [ ] arthralgias [ ] fracture  Skin: [ ] negative [ ] rash [ ] itch  Neurological: [ ] negative [ ] headache [ ] dizziness [ ] syncope [ ] weakness [ ] numbness  Psychiatric: [ ] negative [ ] anxiety [ ] depression  Endocrine: [ ] negative [ ] diabetes [ ] thyroid problem  Hematologic/Lymphatic: [ ] negative [ ] anemia [ ] bleeding problem  Allergic/Immunologic: [ ] negative [ ] itchy eyes [ ] nasal discharge [ ] hives [ ] angioedema  [ ] All other systems negative  [ ] Unable to assess ROS because ________    OBJECTIVE:  ICU Vital Signs Last 24 Hrs  T(C): 37 (16 Mar 2018 04:00), Max: 37.6 (15 Mar 2018 08:00)  T(F): 98.6 (16 Mar 2018 04:00), Max: 99.7 (15 Mar 2018 08:00)  HR: 85 (16 Mar 2018 06:00) (72 - 93)  BP: 111/71 (16 Mar 2018 06:00) (102/44 - 171/70)  BP(mean): 82 (16 Mar 2018 06:00) (64 - 107)  ABP: --  ABP(mean): --  RR: 17 (16 Mar 2018 06:00) (11 - 46)  SpO2: 100% (16 Mar 2018 06:00) (100% - 100%)    Mode: CPAP with PS, FiO2: 30, PEEP: 5, PS: 5, MAP: 7, PIP: 10    03-14 @ 07:01  -  03-15 @ 07:00  --------------------------------------------------------  IN: 1264.9 mL / OUT: 500 mL / NET: 764.9 mL    03-15 @ 07:01 - 03-16 @ 06:59  --------------------------------------------------------  IN: 242.4 mL / OUT: 400 mL / NET: -157.6 mL      CAPILLARY BLOOD GLUCOSE      POCT Blood Glucose.: 123 mg/dL (16 Mar 2018 05:12)      PHYSICAL EXAM:  General:   HEENT:   Lymph Nodes:  Neck:   Respiratory:   Cardiovascular:   Abdomen:   Extremities:   Skin:   Neurological:  Psychiatry:    LINES:    HOSPITAL MEDICATIONS:  Standing Meds:  ALBUTerol/ipratropium for Nebulization 3 milliLiter(s) Nebulizer every 6 hours  aspirin  chewable 81 milliGRAM(s) Oral daily  calamine Lotion 1 Application(s) Topical daily  carvedilol 12.5 milliGRAM(s) Oral every 12 hours  ceFAZolin   IVPB 2000 milliGRAM(s) IV Intermittent <User Schedule>  ceFAZolin   IVPB 3000 milliGRAM(s) IV Intermittent <User Schedule>  dextrose 5%. 1000 milliLiter(s) IV Continuous <Continuous>  dextrose 5%. 1000 milliLiter(s) IV Continuous <Continuous>  dextrose 5%. 1000 milliLiter(s) IV Continuous <Continuous>  dextrose 50% Injectable 12.5 Gram(s) IV Push once  dextrose 50% Injectable 25 Gram(s) IV Push once  dextrose 50% Injectable 25 Gram(s) IV Push once  epoetin odalis Injectable 07924 Unit(s) SubCutaneous <User Schedule>  insulin lispro (HumaLOG) corrective regimen sliding scale   SubCutaneous every 6 hours  insulin NPH human recombinant 8 Unit(s) SubCutaneous <User Schedule>  nystatin Powder 1 Application(s) Topical two times a day  pantoprazole  Injectable 40 milliGRAM(s) IV Push daily  polyethylene glycol 3350 17 Gram(s) Oral daily  propofol Infusion 20 MICROgram(s)/kG/Min IV Continuous <Continuous>  senna Syrup 10 milliLiter(s) Oral at bedtime  sodium chloride 3%  Inhalation 3 milliLiter(s) Inhalation four times a day      PRN Meds:  acetaminophen    Suspension. 650 milliGRAM(s) Oral every 6 hours PRN  chlorhexidine 0.12% Liquid 15 milliLiter(s) Swish and Spit every 4 hours PRN  dextrose Gel 1 Dose(s) Oral once PRN  glucagon  Injectable 1 milliGRAM(s) IntraMuscular once PRN  hydrocortisone 1% Ointment 1 Application(s) Topical every 8 hours PRN  ondansetron Injectable 4 milliGRAM(s) IV Push every 6 hours PRN      LABS:                        8.1    7.1   )-----------( 291      ( 15 Mar 2018 22:51 )             25.7     Hgb Trend: 8.1<--, 8.8<--, 7.5<--, 7.5<--, 7.6<--  03-15    135  |  93<L>  |  23  ----------------------------<  140<H>  3.4<L>   |  22  |  2.19<H>    Ca    8.9      15 Mar 2018 22:51  Phos  4.4     03-15  Mg     2.1     03-15    TPro  6.7  /  Alb  2.5<L>  /  TBili  0.3  /  DBili  x   /  AST  18  /  ALT  <5<L>  /  AlkPhos  238<H>  03-15    Creatinine Trend: 2.19<--, 1.33<--, 2.56<--, 1.80<--, 1.40<--, 3.47<--  PT/INR - ( 15 Mar 2018 23:04 )   PT: 11.0 sec;   INR: 1.02 ratio         PTT - ( 15 Mar 2018 23:04 )  PTT:17.8 sec          MICROBIOLOGY:     Culture - Blood (collected 15 Mar 2018 02:54)  Source: .Blood Blood-Venous  Preliminary Report (16 Mar 2018 03:01):    No growth to date.    Culture - Blood (collected 15 Mar 2018 02:54)  Source: .Blood Blood-Peripheral  Preliminary Report (16 Mar 2018 03:01):    No growth to date.      RADIOLOGY:  [ ] Reviewed and interpreted by me    EKG:    Assessment and Plan:   · Assessment		  68F PMH HTN, DMT2, Breast Cancer (diagnosed in Feb 2017) currently on trastuzumab (last dose Jan 20th), originally presented 1/30 with fever found to have influenza subsequently went into PEA arrest x 2 with ROSC, complicated by bilateral pneumothoraces with pneumomediastinum s/p 3 chest tubes, and new ESRD, readmitted to the MICU for hypoxic respiratory failure likely 2/2 aspiration pneumonia with MSSA PNA and bacteremia on 2/21, extubated on 3/1 with stable respiratory status, now readmitted to the MICU for hypoxic respiratory failure likely secondary to a recurrent aspiration event.    # Neuro:  - currently intubated and sedated  - on propofol gtt, wean after PEG today  - Known watershed ischemia of the occiput from low flow state during admission 2/2 flu    # CV:  - HD stable, off pressors since 3/11  - may consider adding back carvedilol 6.25 q 12 if patient hypertensive    # Pulm:  - s/p trach after 3rd intubation for recurrent aspirations  - CPAP following PEG  - c/w 3% saline with duonebs    # Renal:  - on HD 3x/ week- M,W,F  - s/p permacath placement by IR    # ID:    -concern for recurrent aspiration event  - fever overnight in setting of recent surgical procedure - f/u cultures, consider broadening abx if recurrent  - recent MSSA bacteremia with clearance of blood cultures- c/w ancef until March 24th (ancef dosed after dialysis)  - echo after negative blood cultures shows no signs of vegetation    # GI:  - NPO for PEG tube today  - GI ppx with protonix 40mg daily  - senna, colace, miralax    # Endocrine:  - hold NPH, SSI until tube feeds restarted    # Heme:  - Anemia, Hgb stable in 7s. Will continue to trend H/H, consider transfusion w/ HD for hemoglobin < 7.0.  - continue epo for chronic ESRD-related anemia  - Currently holding Herceptin     # DVT ppx:  - Hold subq heparin for PEG    Jonathan Weil, PGY1  [phone]    Assessment and Plan:   · Assessment		  68F PMH HTN, DMT2, Breast Cancer (diagnosed in Feb 2017) currently on trastuzumab (last dose Jan 20th), originally presented 1/30 with fever found to have influenza subsequently went into PEA arrest x 2 with ROSC, complicated by bilateral pneumothoraces with pneumomediastinum s/p 3 chest tubes, and new ESRD, readmitted to the MICU for hypoxic respiratory failure likely 2/2 aspiration pneumonia with MSSA PNA and bacteremia on 2/21, extubated on 3/1 with stable respiratory status, now readmitted to the MICU for hypoxic respiratory failure likely secondary to a recurrent aspiration event. S/p trach and PEG.    # Neuro:  - on propofol gtt, wean sedation  - Known watershed ischemia of the occiput from low flow state during admission 2/2 flu    # CV:  - HD stable, off pressors since 3/11  - Restarted carvedilol for hypertension overnight    # Pulm:  - s/p trach after 3rd intubation for recurrent aspirations  - CPAP/trach collar  - prn suction  - c/w 3% saline with duonebs    # Renal:  - on HD 3x/ week- M,W,F  - s/p permacath placement by IR    # ID:    -concern for recurrent aspiration event  - fever shortly after trach 2 days ago, no fever since - f/u cultures, consider broadening abx if recurrent  - recent MSSA bacteremia with clearance of blood cultures- c/w ancef until March 24th (ancef dosed after dialysis)  - echo after negative blood cultures shows no signs of vegetation    # GI:  - s/p PEG tube yesterday  - restart feeds today  - GI ppx with protonix 40mg daily  - senna, colace, miralax    # Endocrine:  - Restart NPH and SSI w/ feeds    # Heme:  - Anemia, Hgb stable in 7s. Will continue to trend H/H, consider transfusion w/ HD for hemoglobin < 7.0.  - continue epo for chronic ESRD-related anemia    # DVT ppx:  - Restart sub-q heparin    Dispo: stepdown to floor    Jonathan Weil, PGY1  [phone] CHIEF COMPLAINT:    Interval Events: asymptomatic hypertension O/N, restarted carvedilol w/ improvement    REVIEW OF SYSTEMS:  Constitutional: [ ] negative [ ] fevers [ ] chills [ ] weight loss [ ] weight gain  HEENT: [ ] negative [ ] dry eyes [ ] eye irritation [ ] postnasal drip [ ] nasal congestion  CV: [ ] negative  [ ] chest pain [ ] orthopnea [ ] palpitations [ ] murmur  Resp: [ ] negative [ ] cough [ ] shortness of breath [ ] dyspnea [ ] wheezing [ ] sputum [ ] hemoptysis  GI: [ ] negative [ ] nausea [ ] vomiting [ ] diarrhea [ ] constipation [ ] abd pain [ ] dysphagia   : [ ] negative [ ] dysuria [ ] nocturia [ ] hematuria [ ] increased urinary frequency  Musculoskeletal: [ ] negative [ ] back pain [ ] myalgias [ ] arthralgias [ ] fracture  Skin: [ ] negative [ ] rash [ ] itch  Neurological: [ ] negative [ ] headache [ ] dizziness [ ] syncope [ ] weakness [ ] numbness  Psychiatric: [ ] negative [ ] anxiety [ ] depression  Endocrine: [ ] negative [ ] diabetes [ ] thyroid problem  Hematologic/Lymphatic: [ ] negative [ ] anemia [ ] bleeding problem  Allergic/Immunologic: [ ] negative [ ] itchy eyes [ ] nasal discharge [ ] hives [ ] angioedema  [ ] All other systems negative  [ ] Unable to assess ROS because ________    OBJECTIVE:  ICU Vital Signs Last 24 Hrs  T(C): 37 (16 Mar 2018 04:00), Max: 37.6 (15 Mar 2018 08:00)  T(F): 98.6 (16 Mar 2018 04:00), Max: 99.7 (15 Mar 2018 08:00)  HR: 85 (16 Mar 2018 06:00) (72 - 93)  BP: 111/71 (16 Mar 2018 06:00) (102/44 - 171/70)  BP(mean): 82 (16 Mar 2018 06:00) (64 - 107)  ABP: --  ABP(mean): --  RR: 17 (16 Mar 2018 06:00) (11 - 46)  SpO2: 100% (16 Mar 2018 06:00) (100% - 100%)    Mode: CPAP with PS, FiO2: 30, PEEP: 5, PS: 5, MAP: 7, PIP: 10    03-14 @ 07:01  -  03-15 @ 07:00  --------------------------------------------------------  IN: 1264.9 mL / OUT: 500 mL / NET: 764.9 mL    03-15 @ 07:01 - 03-16 @ 06:59  --------------------------------------------------------  IN: 242.4 mL / OUT: 400 mL / NET: -157.6 mL      CAPILLARY BLOOD GLUCOSE      POCT Blood Glucose.: 123 mg/dL (16 Mar 2018 05:12)      PHYSICAL EXAM:  General:   HEENT:   Lymph Nodes:  Neck:   Respiratory:   Cardiovascular:   Abdomen:   Extremities:   Skin:   Neurological:  Psychiatry:    LINES:    HOSPITAL MEDICATIONS:  Standing Meds:  ALBUTerol/ipratropium for Nebulization 3 milliLiter(s) Nebulizer every 6 hours  aspirin  chewable 81 milliGRAM(s) Oral daily  calamine Lotion 1 Application(s) Topical daily  carvedilol 12.5 milliGRAM(s) Oral every 12 hours  ceFAZolin   IVPB 2000 milliGRAM(s) IV Intermittent <User Schedule>  ceFAZolin   IVPB 3000 milliGRAM(s) IV Intermittent <User Schedule>  dextrose 5%. 1000 milliLiter(s) IV Continuous <Continuous>  dextrose 5%. 1000 milliLiter(s) IV Continuous <Continuous>  dextrose 5%. 1000 milliLiter(s) IV Continuous <Continuous>  dextrose 50% Injectable 12.5 Gram(s) IV Push once  dextrose 50% Injectable 25 Gram(s) IV Push once  dextrose 50% Injectable 25 Gram(s) IV Push once  epoetin odalis Injectable 91682 Unit(s) SubCutaneous <User Schedule>  insulin lispro (HumaLOG) corrective regimen sliding scale   SubCutaneous every 6 hours  insulin NPH human recombinant 8 Unit(s) SubCutaneous <User Schedule>  nystatin Powder 1 Application(s) Topical two times a day  pantoprazole  Injectable 40 milliGRAM(s) IV Push daily  polyethylene glycol 3350 17 Gram(s) Oral daily  propofol Infusion 20 MICROgram(s)/kG/Min IV Continuous <Continuous>  senna Syrup 10 milliLiter(s) Oral at bedtime  sodium chloride 3%  Inhalation 3 milliLiter(s) Inhalation four times a day      PRN Meds:  acetaminophen    Suspension. 650 milliGRAM(s) Oral every 6 hours PRN  chlorhexidine 0.12% Liquid 15 milliLiter(s) Swish and Spit every 4 hours PRN  dextrose Gel 1 Dose(s) Oral once PRN  glucagon  Injectable 1 milliGRAM(s) IntraMuscular once PRN  hydrocortisone 1% Ointment 1 Application(s) Topical every 8 hours PRN  ondansetron Injectable 4 milliGRAM(s) IV Push every 6 hours PRN      LABS:                        8.1    7.1   )-----------( 291      ( 15 Mar 2018 22:51 )             25.7     Hgb Trend: 8.1<--, 8.8<--, 7.5<--, 7.5<--, 7.6<--  03-15    135  |  93<L>  |  23  ----------------------------<  140<H>  3.4<L>   |  22  |  2.19<H>    Ca    8.9      15 Mar 2018 22:51  Phos  4.4     03-15  Mg     2.1     03-15    TPro  6.7  /  Alb  2.5<L>  /  TBili  0.3  /  DBili  x   /  AST  18  /  ALT  <5<L>  /  AlkPhos  238<H>  03-15    Creatinine Trend: 2.19<--, 1.33<--, 2.56<--, 1.80<--, 1.40<--, 3.47<--  PT/INR - ( 15 Mar 2018 23:04 )   PT: 11.0 sec;   INR: 1.02 ratio         PTT - ( 15 Mar 2018 23:04 )  PTT:17.8 sec          MICROBIOLOGY:     Culture - Blood (collected 15 Mar 2018 02:54)  Source: .Blood Blood-Venous  Preliminary Report (16 Mar 2018 03:01):    No growth to date.    Culture - Blood (collected 15 Mar 2018 02:54)  Source: .Blood Blood-Peripheral  Preliminary Report (16 Mar 2018 03:01):    No growth to date.      RADIOLOGY:  [ ] Reviewed and interpreted by me    EKG:    Assessment and Plan:   · Assessment		  68F PMH HTN, DMT2, Breast Cancer (diagnosed in Feb 2017) currently on trastuzumab (last dose Jan 20th), originally presented 1/30 with fever found to have influenza subsequently went into PEA arrest x 2 with ROSC, complicated by bilateral pneumothoraces with pneumomediastinum s/p 3 chest tubes, and new ESRD, readmitted to the MICU for hypoxic respiratory failure likely 2/2 aspiration pneumonia with MSSA PNA and bacteremia on 2/21, extubated on 3/1 with stable respiratory status, now readmitted to the MICU for hypoxic respiratory failure likely secondary to a recurrent aspiration event. S/p trach and PEG.    # Neuro:  - on propofol gtt, wean sedation  - Known watershed ischemia of the occiput from low flow state during admission 2/2 flu    # CV:  - HD stable, off pressors since 3/11  - Restarted carvedilol for hypertension overnight    # Pulm:  - s/p trach after 3rd intubation for recurrent aspirations  - CPAP/trach collar  - prn suction  - c/w 3% saline with duonebs    # Renal:  - on HD 3x/ week- M,W,F  - s/p permacath placement by IR    # ID:    -concern for recurrent aspiration event  - fever shortly after trach 2 days ago, no fever since - f/u cultures, consider broadening abx if recurrent  - recent MSSA bacteremia with clearance of blood cultures- c/w ancef until March 24th (ancef dosed after dialysis)  - echo after negative blood cultures shows no signs of vegetation    # GI:  - s/p PEG tube yesterday  - restart feeds today  - GI ppx with protonix 40mg daily  - senna, colace, miralax    # Endocrine:  - Restart NPH and SSI w/ feeds    # Heme:  - Anemia, Hgb stable in 7s. Will continue to trend H/H, consider transfusion w/ HD for hemoglobin < 7.0.  - continue epo for chronic ESRD-related anemia    # DVT ppx:  - Restart sub-q heparin    Dispo: stepdown to floor    Jonathan Weil, PGY1  [phone] CHIEF COMPLAINT:    Interval Events: asymptomatic hypertension O/N, restarted carvedilol w/ improvement    REVIEW OF SYSTEMS:  Constitutional: [ ] negative [ ] fevers [ ] chills [ ] weight loss [ ] weight gain  HEENT: [ ] negative [ ] dry eyes [ ] eye irritation [ ] postnasal drip [ ] nasal congestion  CV: [ ] negative  [ ] chest pain [ ] orthopnea [ ] palpitations [ ] murmur  Resp: [ ] negative [ ] cough [ ] shortness of breath [ ] dyspnea [ ] wheezing [ ] sputum [ ] hemoptysis  GI: [ ] negative [ ] nausea [ ] vomiting [ ] diarrhea [ ] constipation [ ] abd pain [ ] dysphagia   : [ ] negative [ ] dysuria [ ] nocturia [ ] hematuria [ ] increased urinary frequency  Musculoskeletal: [ ] negative [ ] back pain [ ] myalgias [ ] arthralgias [ ] fracture  Skin: [ ] negative [ ] rash [ ] itch  Neurological: [ ] negative [ ] headache [ ] dizziness [ ] syncope [ ] weakness [ ] numbness  Psychiatric: [ ] negative [ ] anxiety [ ] depression  Endocrine: [ ] negative [ ] diabetes [ ] thyroid problem  Hematologic/Lymphatic: [ ] negative [ ] anemia [ ] bleeding problem  Allergic/Immunologic: [ ] negative [ ] itchy eyes [ ] nasal discharge [ ] hives [ ] angioedema  [ ] All other systems negative  [x] Unable to assess ROS because pt sedated    OBJECTIVE:  ICU Vital Signs Last 24 Hrs  T(C): 37 (16 Mar 2018 04:00), Max: 37.6 (15 Mar 2018 08:00)  T(F): 98.6 (16 Mar 2018 04:00), Max: 99.7 (15 Mar 2018 08:00)  HR: 85 (16 Mar 2018 06:00) (72 - 93)  BP: 111/71 (16 Mar 2018 06:00) (102/44 - 171/70)  BP(mean): 82 (16 Mar 2018 06:00) (64 - 107)  ABP: --  ABP(mean): --  RR: 17 (16 Mar 2018 06:00) (11 - 46)  SpO2: 100% (16 Mar 2018 06:00) (100% - 100%)    Mode: CPAP with PS, FiO2: 30, PEEP: 5, PS: 5, MAP: 7, PIP: 10    03-14 @ 07:01  -  03-15 @ 07:00  --------------------------------------------------------  IN: 1264.9 mL / OUT: 500 mL / NET: 764.9 mL    03-15 @ 07:01 - 03-16 @ 06:59  --------------------------------------------------------  IN: 242.4 mL / OUT: 400 mL / NET: -157.6 mL      CAPILLARY BLOOD GLUCOSE      POCT Blood Glucose.: 123 mg/dL (16 Mar 2018 05:12)      General: NAD, sedated, intubated  HEENT: NC/AT; PERRL, clear conjunctiva  Neck: supple, trach site c/d/i  Respiratory: CTA b/l  Cardiovascular: +S1/S2; RRR  Abdomen: soft, NT/ND; +BS x4  Extremities: WWP, 2+ peripheral pulses b/l; no LE edema  Skin: normal color and turgor; no rash  Neurological: Sedated, intubated, no tremors, no rigidity    LINES: PIV, L subclav mediport, R [facility] MEDICATIONS:  Standing Meds:  ALBUTerol/ipratropium for Nebulization 3 milliLiter(s) Nebulizer every 6 hours  aspirin  chewable 81 milliGRAM(s) Oral daily  calamine Lotion 1 Application(s) Topical daily  carvedilol 12.5 milliGRAM(s) Oral every 12 hours  ceFAZolin   IVPB 2000 milliGRAM(s) IV Intermittent <User Schedule>  ceFAZolin   IVPB 3000 milliGRAM(s) IV Intermittent <User Schedule>  dextrose 5%. 1000 milliLiter(s) IV Continuous <Continuous>  dextrose 5%. 1000 milliLiter(s) IV Continuous <Continuous>  dextrose 5%. 1000 milliLiter(s) IV Continuous <Continuous>  dextrose 50% Injectable 12.5 Gram(s) IV Push once  dextrose 50% Injectable 25 Gram(s) IV Push once  dextrose 50% Injectable 25 Gram(s) IV Push once  epoetin odalis Injectable 41888 Unit(s) SubCutaneous <User Schedule>  insulin lispro (HumaLOG) corrective regimen sliding scale   SubCutaneous every 6 hours  insulin NPH human recombinant 8 Unit(s) SubCutaneous <User Schedule>  nystatin Powder 1 Application(s) Topical two times a day  pantoprazole  Injectable 40 milliGRAM(s) IV Push daily  polyethylene glycol 3350 17 Gram(s) Oral daily  propofol Infusion 20 MICROgram(s)/kG/Min IV Continuous <Continuous>  senna Syrup 10 milliLiter(s) Oral at bedtime  sodium chloride 3%  Inhalation 3 milliLiter(s) Inhalation four times a day      PRN Meds:  acetaminophen    Suspension. 650 milliGRAM(s) Oral every 6 hours PRN  chlorhexidine 0.12% Liquid 15 milliLiter(s) Swish and Spit every 4 hours PRN  dextrose Gel 1 Dose(s) Oral once PRN  glucagon  Injectable 1 milliGRAM(s) IntraMuscular once PRN  hydrocortisone 1% Ointment 1 Application(s) Topical every 8 hours PRN  ondansetron Injectable 4 milliGRAM(s) IV Push every 6 hours PRN      LABS:                        8.1    7.1   )-----------( 291      ( 15 Mar 2018 22:51 )             25.7     Hgb Trend: 8.1<--, 8.8<--, 7.5<--, 7.5<--, 7.6<--  03-15    135  |  93<L>  |  23  ----------------------------<  140<H>  3.4<L>   |  22  |  2.19<H>    Ca    8.9      15 Mar 2018 22:51  Phos  4.4     03-15  Mg     2.1     03-15    TPro  6.7  /  Alb  2.5<L>  /  TBili  0.3  /  DBili  x   /  AST  18  /  ALT  <5<L>  /  AlkPhos  238<H>  03-15    Creatinine Trend: 2.19<--, 1.33<--, 2.56<--, 1.80<--, 1.40<--, 3.47<--  PT/INR - ( 15 Mar 2018 23:04 )   PT: 11.0 sec;   INR: 1.02 ratio         PTT - ( 15 Mar 2018 23:04 )  PTT:17.8 sec          MICROBIOLOGY:     Culture - Blood (collected 15 Mar 2018 02:54)  Source: .Blood Blood-Venous  Preliminary Report (16 Mar 2018 03:01):    No growth to date.    Culture - Blood (collected 15 Mar 2018 02:54)  Source: .Blood Blood-Peripheral  Preliminary Report (16 Mar 2018 03:01):    No growth to date.      RADIOLOGY:  [ ] Reviewed and interpreted by me    EKG:    Assessment and Plan:   · Assessment		  68F PMH HTN, DMT2, Breast Cancer (diagnosed in Feb 2017) currently on trastuzumab (last dose Jan 20th), originally presented 1/30 with fever found to have influenza subsequently went into PEA arrest x 2 with ROSC, complicated by bilateral pneumothoraces with pneumomediastinum s/p 3 chest tubes, and new ESRD, readmitted to the MICU for hypoxic respiratory failure likely 2/2 aspiration pneumonia with MSSA PNA and bacteremia on 2/21, extubated on 3/1 with stable respiratory status, now readmitted to the MICU for hypoxic respiratory failure likely secondary to a recurrent aspiration event. S/p trach and PEG.    # Neuro:  - off sedation  - Known watershed ischemia of the occiput from low flow state during admission 2/2 flu    # CV:  - HD stable, off pressors since 3/11  - Restarted carvedilol for hypertension overnight    # Pulm:  - s/p trach after 3rd intubation for recurrent aspirations  - CPAP/trach collar  - prn suction  - c/w 3% saline with duonebs    # Renal:  - on HD 3x/ week- M,W,F  - s/p permacath placement by IR    # ID:    -concern for recurrent aspiration event  - fever shortly after trach 2 days ago, no fever since - f/u cultures, consider broadening abx if recurrent  - recent MSSA bacteremia with clearance of blood cultures- c/w ancef until March 24th (ancef dosed after dialysis)  - echo after negative blood cultures shows no signs of vegetation    # GI:  - s/p PEG tube yesterday  - restart feeds today  - GI ppx with protonix 40mg daily  - senna, colace, miralax    # Endocrine:  - Restart NPH and SSI w/ feeds    # Heme:  - Anemia, Hgb stable in 7s. Will continue to trend H/H, consider transfusion w/ HD for hemoglobin < 7.0.  - continue epo for chronic ESRD-related anemia    # DVT ppx:  - Restart sub-q heparin    Dispo: stepdown to floor    Jonathan Weil, PGY1  [phone] CHIEF COMPLAINT:    Interval Events: asymptomatic hypertension O/N, restarted carvedilol w/ improvement    REVIEW OF SYSTEMS:  Constitutional: [ ] negative [ ] fevers [ ] chills [ ] weight loss [ ] weight gain  HEENT: [ ] negative [ ] dry eyes [ ] eye irritation [ ] postnasal drip [ ] nasal congestion  CV: [ ] negative  [ ] chest pain [ ] orthopnea [ ] palpitations [ ] murmur  Resp: [ ] negative [ ] cough [ ] shortness of breath [ ] dyspnea [ ] wheezing [ ] sputum [ ] hemoptysis  GI: [ ] negative [ ] nausea [ ] vomiting [ ] diarrhea [ ] constipation [ ] abd pain [ ] dysphagia   : [ ] negative [ ] dysuria [ ] nocturia [ ] hematuria [ ] increased urinary frequency  Musculoskeletal: [ ] negative [ ] back pain [ ] myalgias [ ] arthralgias [ ] fracture  Skin: [ ] negative [ ] rash [ ] itch  Neurological: [ ] negative [ ] headache [ ] dizziness [ ] syncope [ ] weakness [ ] numbness  Psychiatric: [ ] negative [ ] anxiety [ ] depression  Endocrine: [ ] negative [ ] diabetes [ ] thyroid problem  Hematologic/Lymphatic: [ ] negative [ ] anemia [ ] bleeding problem  Allergic/Immunologic: [ ] negative [ ] itchy eyes [ ] nasal discharge [ ] hives [ ] angioedema  [ ] All other systems negative  [x] Unable to assess ROS because pt sedated    OBJECTIVE:  ICU Vital Signs Last 24 Hrs  T(C): 37 (16 Mar 2018 04:00), Max: 37.6 (15 Mar 2018 08:00)  T(F): 98.6 (16 Mar 2018 04:00), Max: 99.7 (15 Mar 2018 08:00)  HR: 85 (16 Mar 2018 06:00) (72 - 93)  BP: 111/71 (16 Mar 2018 06:00) (102/44 - 171/70)  BP(mean): 82 (16 Mar 2018 06:00) (64 - 107)  ABP: --  ABP(mean): --  RR: 17 (16 Mar 2018 06:00) (11 - 46)  SpO2: 100% (16 Mar 2018 06:00) (100% - 100%)    Mode: CPAP with PS, FiO2: 30, PEEP: 5, PS: 5, MAP: 7, PIP: 10    03-14 @ 07:01  -  03-15 @ 07:00  --------------------------------------------------------  IN: 1264.9 mL / OUT: 500 mL / NET: 764.9 mL    03-15 @ 07:01 - 03-16 @ 06:59  --------------------------------------------------------  IN: 242.4 mL / OUT: 400 mL / NET: -157.6 mL      CAPILLARY BLOOD GLUCOSE      POCT Blood Glucose.: 123 mg/dL (16 Mar 2018 05:12)      General: NAD, sedated, intubated  HEENT: NC/AT; PERRL, clear conjunctiva  Neck: supple, trach site c/d/i  Respiratory: CTA b/l  Cardiovascular: +S1/S2; RRR  Abdomen: soft, NT/ND; +BS x4  Extremities: WWP, 2+ peripheral pulses b/l; no LE edema  Skin: normal color and turgor; no rash  Neurological: Sedated, intubated, no tremors, no rigidity    LINES: PIV, L subclav mediport, R [facility] MEDICATIONS:  Standing Meds:  ALBUTerol/ipratropium for Nebulization 3 milliLiter(s) Nebulizer every 6 hours  aspirin  chewable 81 milliGRAM(s) Oral daily  calamine Lotion 1 Application(s) Topical daily  carvedilol 12.5 milliGRAM(s) Oral every 12 hours  ceFAZolin   IVPB 2000 milliGRAM(s) IV Intermittent <User Schedule>  ceFAZolin   IVPB 3000 milliGRAM(s) IV Intermittent <User Schedule>  dextrose 5%. 1000 milliLiter(s) IV Continuous <Continuous>  dextrose 5%. 1000 milliLiter(s) IV Continuous <Continuous>  dextrose 5%. 1000 milliLiter(s) IV Continuous <Continuous>  dextrose 50% Injectable 12.5 Gram(s) IV Push once  dextrose 50% Injectable 25 Gram(s) IV Push once  dextrose 50% Injectable 25 Gram(s) IV Push once  epoetin odalis Injectable 47661 Unit(s) SubCutaneous <User Schedule>  insulin lispro (HumaLOG) corrective regimen sliding scale   SubCutaneous every 6 hours  insulin NPH human recombinant 8 Unit(s) SubCutaneous <User Schedule>  nystatin Powder 1 Application(s) Topical two times a day  pantoprazole  Injectable 40 milliGRAM(s) IV Push daily  polyethylene glycol 3350 17 Gram(s) Oral daily  propofol Infusion 20 MICROgram(s)/kG/Min IV Continuous <Continuous>  senna Syrup 10 milliLiter(s) Oral at bedtime  sodium chloride 3%  Inhalation 3 milliLiter(s) Inhalation four times a day      PRN Meds:  acetaminophen    Suspension. 650 milliGRAM(s) Oral every 6 hours PRN  chlorhexidine 0.12% Liquid 15 milliLiter(s) Swish and Spit every 4 hours PRN  dextrose Gel 1 Dose(s) Oral once PRN  glucagon  Injectable 1 milliGRAM(s) IntraMuscular once PRN  hydrocortisone 1% Ointment 1 Application(s) Topical every 8 hours PRN  ondansetron Injectable 4 milliGRAM(s) IV Push every 6 hours PRN      LABS:                        8.1    7.1   )-----------( 291      ( 15 Mar 2018 22:51 )             25.7     Hgb Trend: 8.1<--, 8.8<--, 7.5<--, 7.5<--, 7.6<--  03-15    135  |  93<L>  |  23  ----------------------------<  140<H>  3.4<L>   |  22  |  2.19<H>    Ca    8.9      15 Mar 2018 22:51  Phos  4.4     03-15  Mg     2.1     03-15    TPro  6.7  /  Alb  2.5<L>  /  TBili  0.3  /  DBili  x   /  AST  18  /  ALT  <5<L>  /  AlkPhos  238<H>  03-15    Creatinine Trend: 2.19<--, 1.33<--, 2.56<--, 1.80<--, 1.40<--, 3.47<--  PT/INR - ( 15 Mar 2018 23:04 )   PT: 11.0 sec;   INR: 1.02 ratio         PTT - ( 15 Mar 2018 23:04 )  PTT:17.8 sec          MICROBIOLOGY:     Culture - Blood (collected 15 Mar 2018 02:54)  Source: .Blood Blood-Venous  Preliminary Report (16 Mar 2018 03:01):    No growth to date.    Culture - Blood (collected 15 Mar 2018 02:54)  Source: .Blood Blood-Peripheral  Preliminary Report (16 Mar 2018 03:01):    No growth to date.      RADIOLOGY:  [ ] Reviewed and interpreted by me    EKG:    Assessment and Plan:   · Assessment		  68F PMH HTN, DMT2, Breast Cancer (diagnosed in Feb 2017) currently on trastuzumab (last dose Jan 20th), originally presented 1/30 with fever found to have influenza subsequently went into PEA arrest x 2 with ROSC, complicated by bilateral pneumothoraces with pneumomediastinum s/p 3 chest tubes, and new ESRD, readmitted to the MICU for hypoxic respiratory failure likely 2/2 aspiration pneumonia with MSSA PNA and bacteremia on 2/21, extubated on 3/1 with stable respiratory status, now readmitted to the MICU for hypoxic respiratory failure likely secondary to a recurrent aspiration event. S/p trach and PEG.    # Neuro:  - off sedation  - Known watershed ischemia of the occiput from low flow state during admission 2/2 flu    # CV:  - HD stable, off pressors since 3/11  - Still hypertensive despite carvedilol. Will convert to labetalol    # Pulm:  - s/p trach after 3rd intubation for recurrent aspirations  - tolerating CPAP, convert to trach collar during the today  - CPAP at night only  - prn suction  - c/w 3% saline with duonebs    # Renal:  - on HD 3x/ week- M,W,F  - s/p permacath placement by IR  - not voiding spontaneously, has required intermittent straight cath  - bladder scan to assess need for repeat straight cath today    # ID:    -concern for recurrent aspiration event  - fever shortly after trach 2 days ago, no fever since - f/u cultures, consider broadening abx if recurrent  - recent MSSA bacteremia with clearance of blood cultures- c/w ancef until March 24th (ancef dosed after dialysis)  - echo after negative blood cultures shows no signs of vegetation    # GI:  - s/p PEG tube yesterday  - restart feeds today per GI  - DC protonix  - senna, colace, miralax  - will need swallow study    # Endocrine:  - Restart NPH and SSI w/ feeds    # Heme:  - Anemia, Hgb stable in 7s. Will continue to trend H/H, consider transfusion w/ HD for hemoglobin < 7.0.  - continue epo for chronic ESRD-related anemia    # DVT ppx:  - Restart sub-q heparin    - re-consult PT/OT    Dispo: stepdown to floor    Jonathan Weil, PGY1  [phone]

## 2018-03-16 NOTE — PROGRESS NOTE ADULT - PROBLEM SELECTOR PLAN 1
from ATN in setting of cardiac arrest. Pt continues to be oligo-anuric, requiring dialysis. No evidence of renal recovery at present.  Patient s/p tunneled HD catheter placement  by IR . Last HD was done on 3/14. Labs reviewed from today; will plan for HD today. Monitor BMP daily.

## 2018-03-16 NOTE — PROGRESS NOTE ADULT - SUBJECTIVE AND OBJECTIVE BOX
[facility] Division of Kidney Diseases & Hypertension  FOLLOW UP NOTE  [phone]--------------------------------------------------------------------------------  Chief Complaint:Cardiac arrest      24 hour events/subjective:    No acute events noted.     PAST HISTORY  --------------------------------------------------------------------------------  No significant changes to PMH, PSH, FHx, SHx, unless otherwise noted    ALLERGIES & MEDICATIONS  --------------------------------------------------------------------------------  Allergies    doxycycline (Rash)  isoniazid (Rash)  NIFEdipine (Urticaria; Hives)  vitamin E (Short breath; Urticaria; Hives)    Intolerances      Standing Inpatient Medications  ALBUTerol/ipratropium for Nebulization 3 milliLiter(s) Nebulizer every 6 hours  aspirin  chewable 81 milliGRAM(s) Oral daily  calamine Lotion 1 Application(s) Topical daily  carvedilol 12.5 milliGRAM(s) Oral every 12 hours  ceFAZolin   IVPB 2000 milliGRAM(s) IV Intermittent <User Schedule>  ceFAZolin   IVPB 3000 milliGRAM(s) IV Intermittent <User Schedule>  dextrose 5%. 1000 milliLiter(s) IV Continuous <Continuous>  dextrose 5%. 1000 milliLiter(s) IV Continuous <Continuous>  dextrose 5%. 1000 milliLiter(s) IV Continuous <Continuous>  dextrose 50% Injectable 12.5 Gram(s) IV Push once  dextrose 50% Injectable 25 Gram(s) IV Push once  dextrose 50% Injectable 25 Gram(s) IV Push once  epoetin odalis Injectable 75742 Unit(s) SubCutaneous <User Schedule>  insulin lispro (HumaLOG) corrective regimen sliding scale   SubCutaneous every 6 hours  insulin NPH human recombinant 8 Unit(s) SubCutaneous <User Schedule>  nystatin Powder 1 Application(s) Topical two times a day  pantoprazole  Injectable 40 milliGRAM(s) IV Push daily  polyethylene glycol 3350 17 Gram(s) Oral daily  propofol Infusion 20 MICROgram(s)/kG/Min IV Continuous <Continuous>  senna Syrup 10 milliLiter(s) Oral at bedtime  sodium chloride 3%  Inhalation 3 milliLiter(s) Inhalation four times a day    PRN Inpatient Medications  acetaminophen    Suspension. 650 milliGRAM(s) Oral every 6 hours PRN  chlorhexidine 0.12% Liquid 15 milliLiter(s) Swish and Spit every 4 hours PRN  dextrose Gel 1 Dose(s) Oral once PRN  glucagon  Injectable 1 milliGRAM(s) IntraMuscular once PRN  hydrocortisone 1% Ointment 1 Application(s) Topical every 8 hours PRN  ondansetron Injectable 4 milliGRAM(s) IV Push every 6 hours PRN      REVIEW OF SYSTEMS  --------------------------------------------------------------------------------  Unable to obtain.    VITALS/PHYSICAL EXAM  --------------------------------------------------------------------------------  T(C): 37 (03-16-18 @ 04:00), Max: 37.6 (03-15-18 @ 08:00)  HR: 84 (03-16-18 @ 07:00) (72 - 93)  BP: 150/68 (03-16-18 @ 07:00) (102/44 - 171/70)  RR: 16 (03-16-18 @ 07:00) (11 - 46)  SpO2: 100% (03-16-18 @ 07:00) (100% - 100%)  Wt(kg): --        03-15-18 @ 07:01  -  03-16-18 @ 07:00  --------------------------------------------------------  IN: 242.4 mL / OUT: 400 mL / NET: -157.6 mL      Physical Exam:    Gen: Intubated, on mechanical ventilator   	HEENT: PERRL, supple neck, no jvp  	Pulm: CTA B/L  	CV: RRR, S1S2; no rub  	Back: No spinal or CVA tenderness; no sacral edema  	Abd: +BS, soft, nontender/nondistended  	: No suprapubic tenderness  	Ext: no edema  	Neuro: Sedated  	Skin: Warm, without rashes  	Vascular access: tunneled HD catheter present.       LABS/STUDIES  --------------------------------------------------------------------------------              8.1    7.1   >-----------<  291      [03-15-18 @ 22:51]              25.7     135  |  93  |  23  ----------------------------<  140      [03-15-18 @ 22:51]  3.4   |  22  |  2.19        Ca     8.9     [03-15-18 @ 22:51]      Mg     2.1     [03-15-18 @ 22:51]      Phos  4.4     [03-15-18 @ 22:51]    TPro  6.7  /  Alb  2.5  /  TBili  0.3  /  DBili  x   /  AST  18  /  ALT  <5  /  AlkPhos  238  [03-15-18 @ 22:51]    PT/INR: PT 11.0 , INR 1.02       [03-15-18 @ 23:04]  PTT: 17.8       [03-15-18 @ 23:04]      Creatinine Trend:  SCr 2.19 [03-15 @ 22:51]  SCr 1.33 [03-14 @ 23:18]  SCr 2.56 [03-13 @ 23:56]  SCr 1.80 [03-13 @ 00:42]  SCr 1.40 [03-12 @ 17:54]

## 2018-03-16 NOTE — PROGRESS NOTE ADULT - ASSESSMENT
IMP:    68 year old female with HTN, DMT2, Breast Cancer (diagnosed in Feb 2017), originally presented on 1/30 with fever found to have influenza subsequently went into PEA arrest x 2 with ROSC, complicated by bilateral pneumothoraces with pneumomediastinum s/p 3 chest tubes, and new ESRD. Patient has had 2 readmissions to the MICU for hypoxic respiratory failure likely 2/2 aspiration pneumonia with intubations and MSSA PNA.     GI consulted for peg tube placement - done 3/15    Plan:   - start tube feeds today  - continue meds and water through PEG  - daily dressing changes  - care as per MICU team  - please call back with questions

## 2018-03-17 LAB
ANION GAP SERPL CALC-SCNC: 15 MMOL/L — SIGNIFICANT CHANGE UP (ref 5–17)
BASOPHILS # BLD AUTO: 0.03 K/UL — SIGNIFICANT CHANGE UP (ref 0–0.2)
BASOPHILS NFR BLD AUTO: 0.5 % — SIGNIFICANT CHANGE UP (ref 0–2)
BUN SERPL-MCNC: 16 MG/DL — SIGNIFICANT CHANGE UP (ref 7–23)
CALCIUM SERPL-MCNC: 9 MG/DL — SIGNIFICANT CHANGE UP (ref 8.4–10.5)
CHLORIDE SERPL-SCNC: 93 MMOL/L — LOW (ref 96–108)
CO2 SERPL-SCNC: 26 MMOL/L — SIGNIFICANT CHANGE UP (ref 22–31)
CREAT SERPL-MCNC: 1.76 MG/DL — HIGH (ref 0.5–1.3)
EOSINOPHIL # BLD AUTO: 0.49 K/UL — SIGNIFICANT CHANGE UP (ref 0–0.5)
EOSINOPHIL NFR BLD AUTO: 7.4 % — HIGH (ref 0–6)
GLUCOSE BLDC GLUCOMTR-MCNC: 227 MG/DL — HIGH (ref 70–99)
GLUCOSE BLDC GLUCOMTR-MCNC: 231 MG/DL — HIGH (ref 70–99)
GLUCOSE BLDC GLUCOMTR-MCNC: 263 MG/DL — HIGH (ref 70–99)
GLUCOSE BLDC GLUCOMTR-MCNC: 269 MG/DL — HIGH (ref 70–99)
GLUCOSE SERPL-MCNC: 253 MG/DL — HIGH (ref 70–99)
HCT VFR BLD CALC: 25.7 % — LOW (ref 34.5–45)
HGB BLD-MCNC: 7.7 G/DL — LOW (ref 11.5–15.5)
IMM GRANULOCYTES NFR BLD AUTO: 0.5 % — SIGNIFICANT CHANGE UP (ref 0–1.5)
LYMPHOCYTES # BLD AUTO: 0.54 K/UL — LOW (ref 1–3.3)
LYMPHOCYTES # BLD AUTO: 8.1 % — LOW (ref 13–44)
MAGNESIUM SERPL-MCNC: 2.1 MG/DL — SIGNIFICANT CHANGE UP (ref 1.6–2.6)
MCHC RBC-ENTMCNC: 28.5 PG — SIGNIFICANT CHANGE UP (ref 27–34)
MCHC RBC-ENTMCNC: 30 GM/DL — LOW (ref 32–36)
MCV RBC AUTO: 95.2 FL — SIGNIFICANT CHANGE UP (ref 80–100)
MONOCYTES # BLD AUTO: 1.29 K/UL — HIGH (ref 0–0.9)
MONOCYTES NFR BLD AUTO: 19.4 % — HIGH (ref 2–14)
NEUTROPHILS # BLD AUTO: 4.26 K/UL — SIGNIFICANT CHANGE UP (ref 1.8–7.4)
NEUTROPHILS NFR BLD AUTO: 64.1 % — SIGNIFICANT CHANGE UP (ref 43–77)
PHOSPHATE SERPL-MCNC: 3.7 MG/DL — SIGNIFICANT CHANGE UP (ref 2.5–4.5)
PLATELET # BLD AUTO: 358 K/UL — SIGNIFICANT CHANGE UP (ref 150–400)
POTASSIUM SERPL-MCNC: 3 MMOL/L — LOW (ref 3.5–5.3)
POTASSIUM SERPL-SCNC: 3 MMOL/L — LOW (ref 3.5–5.3)
RBC # BLD: 2.7 M/UL — LOW (ref 3.8–5.2)
RBC # FLD: 17.3 % — HIGH (ref 10.3–14.5)
SODIUM SERPL-SCNC: 134 MMOL/L — LOW (ref 135–145)
WBC # BLD: 7.67 K/UL — SIGNIFICANT CHANGE UP (ref 3.8–10.5)
WBC # FLD AUTO: 7.67 K/UL — SIGNIFICANT CHANGE UP (ref 3.8–10.5)

## 2018-03-17 PROCEDURE — 99233 SBSQ HOSP IP/OBS HIGH 50: CPT

## 2018-03-17 RX ORDER — POTASSIUM CHLORIDE 20 MEQ
20 PACKET (EA) ORAL
Qty: 0 | Refills: 0 | Status: COMPLETED | OUTPATIENT
Start: 2018-03-17 | End: 2018-03-17

## 2018-03-17 RX ORDER — INSULIN LISPRO 100/ML
VIAL (ML) SUBCUTANEOUS EVERY 6 HOURS
Qty: 0 | Refills: 0 | Status: DISCONTINUED | OUTPATIENT
Start: 2018-03-17 | End: 2018-03-19

## 2018-03-17 RX ORDER — HUMAN INSULIN 100 [IU]/ML
3 INJECTION, SUSPENSION SUBCUTANEOUS EVERY 6 HOURS
Qty: 0 | Refills: 0 | Status: DISCONTINUED | OUTPATIENT
Start: 2018-03-17 | End: 2018-03-19

## 2018-03-17 RX ADMIN — SENNA PLUS 10 MILLILITER(S): 8.6 TABLET ORAL at 22:03

## 2018-03-17 RX ADMIN — SODIUM CHLORIDE 3 MILLILITER(S): 9 INJECTION INTRAMUSCULAR; INTRAVENOUS; SUBCUTANEOUS at 05:55

## 2018-03-17 RX ADMIN — SODIUM CHLORIDE 3 MILLILITER(S): 9 INJECTION INTRAMUSCULAR; INTRAVENOUS; SUBCUTANEOUS at 23:32

## 2018-03-17 RX ADMIN — Medication 6: at 11:42

## 2018-03-17 RX ADMIN — Medication 3: at 18:25

## 2018-03-17 RX ADMIN — POLYETHYLENE GLYCOL 3350 17 GRAM(S): 17 POWDER, FOR SOLUTION ORAL at 11:42

## 2018-03-17 RX ADMIN — HEPARIN SODIUM 5000 UNIT(S): 5000 INJECTION INTRAVENOUS; SUBCUTANEOUS at 22:04

## 2018-03-17 RX ADMIN — CHLORHEXIDINE GLUCONATE 15 MILLILITER(S): 213 SOLUTION TOPICAL at 22:03

## 2018-03-17 RX ADMIN — Medication 20 MILLIEQUIVALENT(S): at 18:28

## 2018-03-17 RX ADMIN — Medication 20 MILLIEQUIVALENT(S): at 16:29

## 2018-03-17 RX ADMIN — Medication 4: at 06:03

## 2018-03-17 RX ADMIN — Medication 3 MILLILITER(S): at 17:42

## 2018-03-17 RX ADMIN — CALAMINE 8% AND ZINC OXIDE 8% 1 APPLICATION(S): 160 LOTION TOPICAL at 11:44

## 2018-03-17 RX ADMIN — Medication 3 MILLILITER(S): at 12:22

## 2018-03-17 RX ADMIN — Medication 650 MILLIGRAM(S): at 22:35

## 2018-03-17 RX ADMIN — HUMAN INSULIN 3 UNIT(S): 100 INJECTION, SUSPENSION SUBCUTANEOUS at 18:25

## 2018-03-17 RX ADMIN — HEPARIN SODIUM 5000 UNIT(S): 5000 INJECTION INTRAVENOUS; SUBCUTANEOUS at 14:13

## 2018-03-17 RX ADMIN — HEPARIN SODIUM 5000 UNIT(S): 5000 INJECTION INTRAVENOUS; SUBCUTANEOUS at 05:35

## 2018-03-17 RX ADMIN — SODIUM CHLORIDE 3 MILLILITER(S): 9 INJECTION INTRAMUSCULAR; INTRAVENOUS; SUBCUTANEOUS at 17:43

## 2018-03-17 RX ADMIN — Medication 3 MILLILITER(S): at 05:48

## 2018-03-17 RX ADMIN — SODIUM CHLORIDE 3 MILLILITER(S): 9 INJECTION INTRAMUSCULAR; INTRAVENOUS; SUBCUTANEOUS at 12:22

## 2018-03-17 RX ADMIN — Medication 3 MILLILITER(S): at 23:32

## 2018-03-17 RX ADMIN — Medication 650 MILLIGRAM(S): at 22:03

## 2018-03-17 RX ADMIN — Medication 81 MILLIGRAM(S): at 11:42

## 2018-03-17 RX ADMIN — Medication 100 MILLIGRAM(S): at 05:35

## 2018-03-17 RX ADMIN — Medication 100 MILLIGRAM(S): at 18:29

## 2018-03-17 RX ADMIN — NYSTATIN CREAM 1 APPLICATION(S): 100000 CREAM TOPICAL at 18:28

## 2018-03-17 RX ADMIN — NYSTATIN CREAM 1 APPLICATION(S): 100000 CREAM TOPICAL at 05:35

## 2018-03-17 NOTE — PROGRESS NOTE ADULT - SUBJECTIVE AND OBJECTIVE BOX
Chief Complaint:     History:    MEDICATIONS  (STANDING):  ALBUTerol/ipratropium for Nebulization 3 milliLiter(s) Nebulizer every 6 hours  aspirin  chewable 81 milliGRAM(s) Oral daily  calamine Lotion 1 Application(s) Topical daily  ceFAZolin   IVPB 2000 milliGRAM(s) IV Intermittent <User Schedule>  ceFAZolin   IVPB 3000 milliGRAM(s) IV Intermittent <User Schedule>  dextrose 5%. 1000 milliLiter(s) (50 mL/Hr) IV Continuous <Continuous>  dextrose 50% Injectable 12.5 Gram(s) IV Push once  dextrose 50% Injectable 25 Gram(s) IV Push once  epoetin odalis Injectable 64676 Unit(s) SubCutaneous <User Schedule>  heparin  Injectable 5000 Unit(s) SubCutaneous every 8 hours  insulin lispro (HumaLOG) corrective regimen sliding scale   SubCutaneous every 6 hours  labetalol 100 milliGRAM(s) Oral two times a day  nystatin Powder 1 Application(s) Topical two times a day  polyethylene glycol 3350 17 Gram(s) Oral daily  potassium chloride   Solution 20 milliEquivalent(s) Oral every 2 hours  senna Syrup 10 milliLiter(s) Oral at bedtime  sodium chloride 3%  Inhalation 3 milliLiter(s) Inhalation four times a day    MEDICATIONS  (PRN):  acetaminophen    Suspension. 650 milliGRAM(s) Oral every 6 hours PRN Mild Pain (1 - 3)  chlorhexidine 0.12% Liquid 15 milliLiter(s) Swish and Spit every 4 hours PRN mouth hygiene  glucagon  Injectable 1 milliGRAM(s) IntraMuscular once PRN Glucose LESS THAN 70 milligrams/deciliter  hydrocortisone 1% Ointment 1 Application(s) Topical every 8 hours PRN Rash and/or Itching      Allergies    doxycycline (Rash)  isoniazid (Rash)  NIFEdipine (Urticaria; Hives)  vitamin E (Short breath; Urticaria; Hives)    Intolerances      Review of Systems:  Constitutional: No fever  Eyes: No blurry vision  Neuro: No tremors  HEENT: No pain  Cardiovascular: No chest pain, palpitations  Respiratory: No SOB, no cough  GI: No nausea, vomiting, abdominal pain  : No dysuria  Skin: no rash  Psych: no depression  Endocrine: no polyuria, polydipsia  Hem/lymph: no swelling  Osteoporosis: no fractures    ALL OTHER SYSTEMS REVIEWED AND NEGATIVE    UNABLE TO OBTAIN    PHYSICAL EXAM:  VITALS: T(C): 36.8 (03-17-18 @ 13:26)  T(F): 98.2 (03-17-18 @ 13:26), Max: 98.6 (03-16-18 @ 18:21)  HR: 92 (03-17-18 @ 16:38) (81 - 109)  BP: 134/74 (03-17-18 @ 13:26) (117/75 - 134/74)  RR:  (16 - 24)  SpO2:  (95% - 100%)  Wt(kg): --  GENERAL: NAD, well-groomed, well-developed  EYES: No proptosis, no lid lag, anicteric  HEENT:  Atraumatic, Normocephalic, moist mucous membranes  THYROID: Normal size, no palpable nodules  RESPIRATORY: Clear to auscultation bilaterally; No rales, rhonchi, wheezing, or rubs  CARDIOVASCULAR: Regular rate and rhythm; No murmurs; no peripheral edema  GI: Soft, nontender, non distended, normal bowel sounds  SKIN: Dry, intact, No rashes or lesions  MUSCULOSKELETAL: Full range of motion, normal strength  NEURO: sensation intact, extraocular movements intact, no tremor, normal reflexes  PSYCH: Alert and oriented x 3, normal affect, normal mood  CUSHING'S SIGNS: no striae    POCT Blood Glucose.: 263 mg/dL (03-17-18 @ 11:36)  POCT Blood Glucose.: 227 mg/dL (03-17-18 @ 05:38)  POCT Blood Glucose.: 147 mg/dL (03-16-18 @ 23:25)  POCT Blood Glucose.: 180 mg/dL (03-16-18 @ 18:13)  POCT Blood Glucose.: 176 mg/dL (03-16-18 @ 11:26)  POCT Blood Glucose.: 123 mg/dL (03-16-18 @ 05:12)  POCT Blood Glucose.: 170 mg/dL (03-16-18 @ 00:41)  POCT Blood Glucose.: 107 mg/dL (03-15-18 @ 17:52)  POCT Blood Glucose.: 165 mg/dL (03-15-18 @ 12:20)  POCT Blood Glucose.: 119 mg/dL (03-15-18 @ 05:09)  POCT Blood Glucose.: 211 mg/dL (03-14-18 @ 23:48)  POCT Blood Glucose.: 144 mg/dL (03-14-18 @ 17:44)      03-17    134<L>  |  93<L>  |  16  ----------------------------<  253<H>  3.0<L>   |  26  |  1.76<H>    EGFR if : 33<L>  EGFR if non : 29<L>    Ca    9.0      03-17  Mg     2.1     03-17  Phos  3.7     03-17    TPro  6.7  /  Alb  2.5<L>  /  TBili  0.3  /  DBili  x   /  AST  18  /  ALT  <5<L>  /  AlkPhos  238<H>  03-15          Thyroid Function Tests:      Hemoglobin A1C, Whole Blood: 8.6 % <H> [4.0 - 5.6] (01-31-18 @ 07:15)  Hemoglobin A1C, Whole Blood: 8.7 % <H> [4.0 - 5.6] (01-31-18 @ 05:51) Chief Complaint: T2DM    History: Restarted on tube feeds    MEDICATIONS  (STANDING):  ALBUTerol/ipratropium for Nebulization 3 milliLiter(s) Nebulizer every 6 hours  aspirin  chewable 81 milliGRAM(s) Oral daily  calamine Lotion 1 Application(s) Topical daily  ceFAZolin   IVPB 2000 milliGRAM(s) IV Intermittent <User Schedule>  ceFAZolin   IVPB 3000 milliGRAM(s) IV Intermittent <User Schedule>  dextrose 5%. 1000 milliLiter(s) (50 mL/Hr) IV Continuous <Continuous>  dextrose 50% Injectable 12.5 Gram(s) IV Push once  dextrose 50% Injectable 25 Gram(s) IV Push once  epoetin odalis Injectable 03938 Unit(s) SubCutaneous <User Schedule>  heparin  Injectable 5000 Unit(s) SubCutaneous every 8 hours  insulin lispro (HumaLOG) corrective regimen sliding scale   SubCutaneous every 6 hours  labetalol 100 milliGRAM(s) Oral two times a day  nystatin Powder 1 Application(s) Topical two times a day  polyethylene glycol 3350 17 Gram(s) Oral daily  potassium chloride   Solution 20 milliEquivalent(s) Oral every 2 hours  senna Syrup 10 milliLiter(s) Oral at bedtime  sodium chloride 3%  Inhalation 3 milliLiter(s) Inhalation four times a day    MEDICATIONS  (PRN):  acetaminophen    Suspension. 650 milliGRAM(s) Oral every 6 hours PRN Mild Pain (1 - 3)  chlorhexidine 0.12% Liquid 15 milliLiter(s) Swish and Spit every 4 hours PRN mouth hygiene  glucagon  Injectable 1 milliGRAM(s) IntraMuscular once PRN Glucose LESS THAN 70 milligrams/deciliter  hydrocortisone 1% Ointment 1 Application(s) Topical every 8 hours PRN Rash and/or Itching      Allergies    doxycycline (Rash)  isoniazid (Rash)  NIFEdipine (Urticaria; Hives)  vitamin E (Short breath; Urticaria; Hives)    Intolerances      Review of Systems:  Constitutional: No fever  Eyes: No blurry vision  Neuro: No tremors  HEENT: No pain  Cardiovascular: No chest pain, palpitations  Respiratory: No SOB, no cough  GI: No nausea, vomiting, abdominal pain  : No dysuria  Skin: no rash  Psych: no depression  Endocrine: no polyuria, polydipsia  Hem/lymph: no swelling  Osteoporosis: no fractures    ALL OTHER SYSTEMS REVIEWED AND NEGATIVE    UNABLE TO OBTAIN    PHYSICAL EXAM:  VITALS: T(C): 36.8 (03-17-18 @ 13:26)  T(F): 98.2 (03-17-18 @ 13:26), Max: 98.6 (03-16-18 @ 18:21)  HR: 92 (03-17-18 @ 16:38) (81 - 109)  BP: 134/74 (03-17-18 @ 13:26) (117/75 - 134/74)  RR:  (16 - 24)  SpO2:  (95% - 100%)  Wt(kg): --  GENERAL: NAD, well-groomed, well-developed  EYES: No proptosis, no lid lag, anicteric  HEENT:  Atraumatic, Normocephalic, moist mucous membranes  THYROID: Normal size, no palpable nodules  RESPIRATORY: Clear to auscultation bilaterally; No rales, rhonchi, wheezing, or rubs  CARDIOVASCULAR: Regular rate and rhythm; No murmurs; no peripheral edema  GI: Soft, nontender, non distended, normal bowel sounds  SKIN: Dry, intact, No rashes or lesions  MUSCULOSKELETAL: Full range of motion, normal strength  NEURO: sensation intact, extraocular movements intact, no tremor, normal reflexes  PSYCH: Alert and oriented x 3, normal affect, normal mood  CUSHING'S SIGNS: no striae    POCT Blood Glucose.: 263 mg/dL (03-17-18 @ 11:36)  POCT Blood Glucose.: 227 mg/dL (03-17-18 @ 05:38)  POCT Blood Glucose.: 147 mg/dL (03-16-18 @ 23:25)  POCT Blood Glucose.: 180 mg/dL (03-16-18 @ 18:13)  POCT Blood Glucose.: 176 mg/dL (03-16-18 @ 11:26)  POCT Blood Glucose.: 123 mg/dL (03-16-18 @ 05:12)  POCT Blood Glucose.: 170 mg/dL (03-16-18 @ 00:41)  POCT Blood Glucose.: 107 mg/dL (03-15-18 @ 17:52)  POCT Blood Glucose.: 165 mg/dL (03-15-18 @ 12:20)  POCT Blood Glucose.: 119 mg/dL (03-15-18 @ 05:09)  POCT Blood Glucose.: 211 mg/dL (03-14-18 @ 23:48)  POCT Blood Glucose.: 144 mg/dL (03-14-18 @ 17:44)      03-17    134<L>  |  93<L>  |  16  ----------------------------<  253<H>  3.0<L>   |  26  |  1.76<H>    EGFR if : 33<L>  EGFR if non : 29<L>    Ca    9.0      03-17  Mg     2.1     03-17  Phos  3.7     03-17    TPro  6.7  /  Alb  2.5<L>  /  TBili  0.3  /  DBili  x   /  AST  18  /  ALT  <5<L>  /  AlkPhos  238<H>  03-15          Thyroid Function Tests:      Hemoglobin A1C, Whole Blood: 8.6 % <H> [4.0 - 5.6] (01-31-18 @ 07:15)  Hemoglobin A1C, Whole Blood: 8.7 % <H> [4.0 - 5.6] (01-31-18 @ 05:51)

## 2018-03-17 NOTE — PROGRESS NOTE ADULT - ASSESSMENT
67F PMH HTN, DMT2 (70/30 TDD: 90 on admission), Breast Cancer (diagnosed in Feb 2017) currently on Herceptin (last dose Jan 20th), originally presented 1/30 with Fever found to have influenza subsequently went into PEA arrest x 2 with ROSC, complicated by bilateral pneumothoraces s/p 3 chest tubes and paracentesis decompression of the abdomen, then found to have PE s/p tPA 1/30, also s/p new CVA, and also new ESRD requiring almost daily HD.     First MICU admission:  Patient was admitted to MICU for further management. Patient was found to have acute renal failure and shock liver. Nephrology was consulted and patient was started on dialysis. Patient was started on Tamiflu for flu and zosyn for possible aspiration pneumonia. Patients chest tubes and peritoneal drain were removed. Patient redeveloped a pneumothorax on the L side and chest tube was reinserted. 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 Urine legionella was negative and azithromycin was discontinued. Patient continued to be anuric and continued to receive dialysis. Shock liver resolved. Patient was extubated on 2/8/17. Patient completed a 5 day course of tamiflu and 7 day course of zosyn and did not show signs of infection. Patients pneumothorax on L side resolved and chest tube was removed. Patients mental status improved and was alert and oriented x1-2 and spontaneously moves all extremities. Patient had an NG tube placed pending an official speech and swallow eval. Patient failed speech and swallow an a repeat speech and swallow was planned. Patient was then transferred to the floors for further management.     Second MICU admission on 2/21.-3/1  RRT initially called this AM for worsening tachypnea. Pt was evaluated at bedside breathing around 30s with belly breathing, hypoxic to 85% on 40% FIO2, increased to 100% with improvement in oxygen saturation to 97%. Cxray read as worsening pulm edema. ABG showed hypercapnia. Renal called for urgent HD. Also concern for aspiration PNA as increasing density of RLL.     During MICU admission patient treated with 5 days of vanc and zosyn, found to have MSSA bacteremia and MSSA in the sputum, now transitioned to cefazolin until 3/22 (post dialysis). Bacteremia has cleared since 2/22. Patient was extubated on 3/1 to nasal cannula, and was saturating well.    MICU stay #3- 3/12-hypoxic resp. failure--MICU--trach and peg 3/14-15 respectively--transfered back to [facility] 3/16 in pm

## 2018-03-17 NOTE — PROGRESS NOTE ADULT - SUBJECTIVE AND OBJECTIVE BOX
Patient is a 68y old  Female who presents with a chief complaint of Fever, total body weakness (02 Feb 2018 13:44)      Interval Events:  from MICU    REVIEW OF SYSTEMS:  [ ] Positive  [ ] All other systems negative  [ ] Unable to assess ROS because ________    Vital Signs Last 24 Hrs  T(C): 36.7 (03-17-18 @ 04:24), Max: 37.7 (03-16-18 @ 11:00)  T(F): 98.1 (03-17-18 @ 04:24), Max: 99.8 (03-16-18 @ 11:00)  HR: 96 (03-17-18 @ 05:55) (82 - 109)  BP: 117/75 (03-17-18 @ 04:24) (112/51 - 184/78)  RR: 18 (03-17-18 @ 04:24) (16 - 30)  SpO2: 100% (03-17-18 @ 05:55) (95% - 100%)    PHYSICAL EXAM:  HEENT:   [ ]Tracheostomy:  [ ]Pupils equal  [ ]No oral lesions  [ ]Abnormal    SKIN  [ ]No Rash  [ ] Abnormal  [ ] pressure    CARDIAC  [ ]Regular  [ ]Abnormal    PULMONARY  [ ]Bilateral Clear Breath Sounds  [ ]Normal Excursion  [ ]Abnormal    GI  [ ]PEG      [ ] +BS		              [ ]Soft, nondistended, nontender	  [ ]Abnormal    MUSCULOSKELETAL                                   [ ]Bedbound                 [ ]Abnormal    [ ]Ambulatory/OOB to chair                           EXTREMITIES                                         [ ]Normal  [ ]Edema                           NEUROLOGIC  [ ] Normal, non focal  [ ] Focal findings:    PSYCHIATRIC  [ ]Alert and appropriate  [ ] Sedated	 [ ]Agitated    :  Summer: [ ] Yes, if yes: Date of Placement:                   [  ] No    LINES: Central Lines [ ] Yes, if yes: Date of Placement                                     [  ] No    HOSPITAL MEDICATIONS:  MEDICATIONS  (STANDING):  ALBUTerol/ipratropium for Nebulization 3 milliLiter(s) Nebulizer every 6 hours  aspirin  chewable 81 milliGRAM(s) Oral daily  calamine Lotion 1 Application(s) Topical daily  ceFAZolin   IVPB 2000 milliGRAM(s) IV Intermittent <User Schedule>  ceFAZolin   IVPB 3000 milliGRAM(s) IV Intermittent <User Schedule>  dextrose 5%. 1000 milliLiter(s) (50 mL/Hr) IV Continuous <Continuous>  dextrose 50% Injectable 12.5 Gram(s) IV Push once  dextrose 50% Injectable 25 Gram(s) IV Push once  epoetin odalis Injectable 23164 Unit(s) SubCutaneous <User Schedule>  heparin  Injectable 5000 Unit(s) SubCutaneous every 8 hours  insulin lispro (HumaLOG) corrective regimen sliding scale   SubCutaneous every 6 hours  labetalol 100 milliGRAM(s) Oral two times a day  nystatin Powder 1 Application(s) Topical two times a day  polyethylene glycol 3350 17 Gram(s) Oral daily  senna Syrup 10 milliLiter(s) Oral at bedtime  sodium chloride 3%  Inhalation 3 milliLiter(s) Inhalation four times a day    MEDICATIONS  (PRN):  acetaminophen    Suspension. 650 milliGRAM(s) Oral every 6 hours PRN Mild Pain (1 - 3)  chlorhexidine 0.12% Liquid 15 milliLiter(s) Swish and Spit every 4 hours PRN mouth hygiene  glucagon  Injectable 1 milliGRAM(s) IntraMuscular once PRN Glucose LESS THAN 70 milligrams/deciliter  hydrocortisone 1% Ointment 1 Application(s) Topical every 8 hours PRN Rash and/or Itching      LABS:                        8.1    7.1   )-----------( 291      ( 15 Mar 2018 22:51 )             25.7     03-17    134<L>  |  93<L>  |  16  ----------------------------<  253<H>  3.0<L>   |  26  |  1.76<H>    Ca    9.0      17 Mar 2018 06:16  Phos  3.7     03-17  Mg     2.1     03-17    TPro  6.7  /  Alb  2.5<L>  /  TBili  0.3  /  DBili  x   /  AST  18  /  ALT  <5<L>  /  AlkPhos  238<H>  03-15    PT/INR - ( 15 Mar 2018 23:04 )   PT: 11.0 sec;   INR: 1.02 ratio         PTT - ( 15 Mar 2018 23:04 )  PTT:17.8 sec        CAPILLARY BLOOD GLUCOSE    MICROBIOLOGY:     RADIOLOGY:  [ ] Reviewed and interpreted by me    Mode: CPAP with PS  FiO2: 30  PEEP: 5  PS: 5  MAP: 7

## 2018-03-17 NOTE — PROGRESS NOTE ADULT - ATTENDING COMMENTS
Agree with above  Transferred from MICU o/n--to RCU for continued care  Continue to attempt wean to TC as able  TF via PEG, ancef to continue until 3/22 for MSSA    Jkarp [phone]

## 2018-03-17 NOTE — PROGRESS NOTE ADULT - SUBJECTIVE AND OBJECTIVE BOX
Patient is a 68y old  Female who presents with a chief complaint of Fever, total body weakness (02 Feb 2018 13:44)      Interval Events:  transfer from MICU last night    REVIEW OF SYSTEMS:  [ ] Positive  [x ] All other systems negative   interactive  [ ] Unable to assess ROS because ________    Vital Signs Last 24 Hrs  T(C): 36.7 (03-17-18 @ 04:24), Max: 37.7 (03-16-18 @ 11:00)  T(F): 98.1 (03-17-18 @ 04:24), Max: 99.8 (03-16-18 @ 11:00)  HR: 96 (03-17-18 @ 05:55) (84 - 109)  BP: 117/75 (03-17-18 @ 04:24) (112/51 - 184/78)  RR: 18 (03-17-18 @ 04:24) (16 - 30)  SpO2: 100% (03-17-18 @ 05:55) (95% - 100%)    PHYSICAL EXAM:  HEENT:   [x ]Tracheostomy:  [x ]Pupils equal  [ ]No oral lesions  [ ]Abnormal    SKIN  [x ]No Rash  [ ] Abnormal  [ ] pressure    CARDIAC  [x ]Regular  [ ]Abnormal    PULMONARY  [x ]Bilateral Clear Breath Sounds  [ ]Normal Excursion  [ ]Abnormal    GI  [ x]PEG      [x ] +BS		              [x ]Soft, nondistended, nontender	  [ ]Abnormal    MUSCULOSKELETAL                                   [ ]Bedbound                 [ ]Abnormal    [x ]OOB to chair                           EXTREMITIES                                         [x ]Normal  [ ]Edema                           NEUROLOGIC  [x ] Normal, non focal  [ ] Focal findings: weak right LE    PSYCHIATRIC  x[ ]Alert and appropriate  [ ] Sedated	 [ ]Agitated    :  Wheeler: [ ] Yes, if yes: Date of Placement:                   [x  ] No    LINES: Central Lines [ ] Yes, if yes: Date of Placement   L chest  mediport      R chest percath                                      [  ] No    HOSPITAL MEDICATIONS:  MEDICATIONS  (STANDING):  ALBUTerol/ipratropium for Nebulization 3 milliLiter(s) Nebulizer every 6 hours  aspirin  chewable 81 milliGRAM(s) Oral daily  calamine Lotion 1 Application(s) Topical daily  ceFAZolin   IVPB 2000 milliGRAM(s) IV Intermittent <User Schedule>  ceFAZolin   IVPB 3000 milliGRAM(s) IV Intermittent <User Schedule>  dextrose 5%. 1000 milliLiter(s) (50 mL/Hr) IV Continuous <Continuous>  dextrose 50% Injectable 12.5 Gram(s) IV Push once  dextrose 50% Injectable 25 Gram(s) IV Push once  epoetin odalis Injectable 24885 Unit(s) SubCutaneous <User Schedule>  heparin  Injectable 5000 Unit(s) SubCutaneous every 8 hours  insulin lispro (HumaLOG) corrective regimen sliding scale   SubCutaneous every 6 hours  labetalol 100 milliGRAM(s) Oral two times a day  nystatin Powder 1 Application(s) Topical two times a day  polyethylene glycol 3350 17 Gram(s) Oral daily  senna Syrup 10 milliLiter(s) Oral at bedtime  sodium chloride 3%  Inhalation 3 milliLiter(s) Inhalation four times a day    MEDICATIONS  (PRN):  acetaminophen    Suspension. 650 milliGRAM(s) Oral every 6 hours PRN Mild Pain (1 - 3)  chlorhexidine 0.12% Liquid 15 milliLiter(s) Swish and Spit every 4 hours PRN mouth hygiene  glucagon  Injectable 1 milliGRAM(s) IntraMuscular once PRN Glucose LESS THAN 70 milligrams/deciliter  hydrocortisone 1% Ointment 1 Application(s) Topical every 8 hours PRN Rash and/or Itching      LABS:                        8.1    7.1   )-----------( 291      ( 15 Mar 2018 22:51 )             25.7     03-17    134<L>  |  93<L>  |  16  ----------------------------<  253<H>  3.0<L>   |  26  |  1.76<H>    Ca    9.0      17 Mar 2018 06:16  Phos  3.7     03-17  Mg     2.1     03-17    TPro  6.7  /  Alb  2.5<L>  /  TBili  0.3  /  DBili  x   /  AST  18  /  ALT  <5<L>  /  AlkPhos  238<H>  03-15    PT/INR - ( 15 Mar 2018 23:04 )   PT: 11.0 sec;   INR: 1.02 ratio         PTT - ( 15 Mar 2018 23:04 )  PTT:17.8 sec        CAPILLARY BLOOD GLUCOSE    MICROBIOLOGY:     RADIOLOGY:  [ ] Reviewed and interpreted by me    Mode: CPAP with PS  FiO2: 30  PEEP: 5  PS: 5  MAP: 7

## 2018-03-17 NOTE — PROGRESS NOTE ADULT - PROBLEM SELECTOR PLAN 7
MSSA bacteremia s/p 5 days of vanc/ zosyn, 3 days of nafcillin, now on Ancef switched from nafcillin as patient may have allergic reaction to naf)- dose ancef with dialysis (total 4 weeks) until March 22   - echo shows no signs of vegetation

## 2018-03-17 NOTE — PROGRESS NOTE ADULT - ASSESSMENT
67 y/o F with T2DM uncontrolled with neuropathy and ESRD now on HD on insulin, osteoporosis, HTN, here with acute respiratory failure 2/2 influenza s/p CVA and PEA arrest x 2, and MSSA bacteremia on month long tx for aspiration pna, re-intubated on 3.11.18 for hypoxia/respiratory distress now s/p Trach. Restarted on tube feeds.

## 2018-03-18 LAB
ANION GAP SERPL CALC-SCNC: 13 MMOL/L — SIGNIFICANT CHANGE UP (ref 5–17)
BLD GP AB SCN SERPL QL: NEGATIVE — SIGNIFICANT CHANGE UP
BUN SERPL-MCNC: 28 MG/DL — HIGH (ref 7–23)
CALCIUM SERPL-MCNC: 9.2 MG/DL — SIGNIFICANT CHANGE UP (ref 8.4–10.5)
CHLORIDE SERPL-SCNC: 96 MMOL/L — SIGNIFICANT CHANGE UP (ref 96–108)
CO2 SERPL-SCNC: 28 MMOL/L — SIGNIFICANT CHANGE UP (ref 22–31)
CREAT SERPL-MCNC: 2.66 MG/DL — HIGH (ref 0.5–1.3)
GLUCOSE BLDC GLUCOMTR-MCNC: 179 MG/DL — HIGH (ref 70–99)
GLUCOSE BLDC GLUCOMTR-MCNC: 225 MG/DL — HIGH (ref 70–99)
GLUCOSE BLDC GLUCOMTR-MCNC: 246 MG/DL — HIGH (ref 70–99)
GLUCOSE BLDC GLUCOMTR-MCNC: 250 MG/DL — HIGH (ref 70–99)
GLUCOSE SERPL-MCNC: 186 MG/DL — HIGH (ref 70–99)
HCT VFR BLD CALC: 24.6 % — LOW (ref 34.5–45)
HGB BLD-MCNC: 7.6 G/DL — LOW (ref 11.5–15.5)
MCHC RBC-ENTMCNC: 29.8 PG — SIGNIFICANT CHANGE UP (ref 27–34)
MCHC RBC-ENTMCNC: 31.1 GM/DL — LOW (ref 32–36)
MCV RBC AUTO: 95.9 FL — SIGNIFICANT CHANGE UP (ref 80–100)
PLATELET # BLD AUTO: 356 K/UL — SIGNIFICANT CHANGE UP (ref 150–400)
POTASSIUM SERPL-MCNC: 3.5 MMOL/L — SIGNIFICANT CHANGE UP (ref 3.5–5.3)
POTASSIUM SERPL-SCNC: 3.5 MMOL/L — SIGNIFICANT CHANGE UP (ref 3.5–5.3)
RBC # BLD: 2.57 M/UL — LOW (ref 3.8–5.2)
RBC # FLD: 16.4 % — HIGH (ref 10.3–14.5)
RH IG SCN BLD-IMP: POSITIVE — SIGNIFICANT CHANGE UP
SODIUM SERPL-SCNC: 137 MMOL/L — SIGNIFICANT CHANGE UP (ref 135–145)
WBC # BLD: 5.2 K/UL — SIGNIFICANT CHANGE UP (ref 3.8–10.5)
WBC # FLD AUTO: 5.2 K/UL — SIGNIFICANT CHANGE UP (ref 3.8–10.5)

## 2018-03-18 PROCEDURE — 99233 SBSQ HOSP IP/OBS HIGH 50: CPT

## 2018-03-18 RX ORDER — METOCLOPRAMIDE HCL 10 MG
5 TABLET ORAL EVERY 8 HOURS
Qty: 0 | Refills: 0 | Status: DISCONTINUED | OUTPATIENT
Start: 2018-03-18 | End: 2018-03-18

## 2018-03-18 RX ORDER — METOCLOPRAMIDE HCL 10 MG
10 TABLET ORAL EVERY 8 HOURS
Qty: 0 | Refills: 0 | Status: DISCONTINUED | OUTPATIENT
Start: 2018-03-18 | End: 2018-03-19

## 2018-03-18 RX ORDER — POLYETHYLENE GLYCOL 3350 17 G/17G
17 POWDER, FOR SOLUTION ORAL
Qty: 0 | Refills: 0 | Status: DISCONTINUED | OUTPATIENT
Start: 2018-03-18 | End: 2018-03-19

## 2018-03-18 RX ADMIN — Medication 1 APPLICATION(S): at 02:37

## 2018-03-18 RX ADMIN — Medication 1: at 06:53

## 2018-03-18 RX ADMIN — HEPARIN SODIUM 5000 UNIT(S): 5000 INJECTION INTRAVENOUS; SUBCUTANEOUS at 13:46

## 2018-03-18 RX ADMIN — HUMAN INSULIN 3 UNIT(S): 100 INJECTION, SUSPENSION SUBCUTANEOUS at 18:00

## 2018-03-18 RX ADMIN — Medication 100 MILLIGRAM(S): at 06:52

## 2018-03-18 RX ADMIN — Medication 100 MILLIGRAM(S): at 18:01

## 2018-03-18 RX ADMIN — HEPARIN SODIUM 5000 UNIT(S): 5000 INJECTION INTRAVENOUS; SUBCUTANEOUS at 06:53

## 2018-03-18 RX ADMIN — Medication 81 MILLIGRAM(S): at 13:08

## 2018-03-18 RX ADMIN — HEPARIN SODIUM 5000 UNIT(S): 5000 INJECTION INTRAVENOUS; SUBCUTANEOUS at 21:54

## 2018-03-18 RX ADMIN — POLYETHYLENE GLYCOL 3350 17 GRAM(S): 17 POWDER, FOR SOLUTION ORAL at 21:54

## 2018-03-18 RX ADMIN — SENNA PLUS 10 MILLILITER(S): 8.6 TABLET ORAL at 21:55

## 2018-03-18 RX ADMIN — SODIUM CHLORIDE 3 MILLILITER(S): 9 INJECTION INTRAMUSCULAR; INTRAVENOUS; SUBCUTANEOUS at 11:43

## 2018-03-18 RX ADMIN — SODIUM CHLORIDE 3 MILLILITER(S): 9 INJECTION INTRAMUSCULAR; INTRAVENOUS; SUBCUTANEOUS at 06:42

## 2018-03-18 RX ADMIN — Medication 3 MILLILITER(S): at 06:43

## 2018-03-18 RX ADMIN — HUMAN INSULIN 3 UNIT(S): 100 INJECTION, SUSPENSION SUBCUTANEOUS at 06:53

## 2018-03-18 RX ADMIN — NYSTATIN CREAM 1 APPLICATION(S): 100000 CREAM TOPICAL at 18:01

## 2018-03-18 RX ADMIN — Medication 3 MILLILITER(S): at 17:37

## 2018-03-18 RX ADMIN — Medication 3 MILLILITER(S): at 11:37

## 2018-03-18 RX ADMIN — SODIUM CHLORIDE 3 MILLILITER(S): 9 INJECTION INTRAMUSCULAR; INTRAVENOUS; SUBCUTANEOUS at 17:40

## 2018-03-18 RX ADMIN — Medication 10 MILLIGRAM(S): at 22:03

## 2018-03-18 RX ADMIN — Medication 2: at 23:11

## 2018-03-18 RX ADMIN — Medication 2: at 00:18

## 2018-03-18 RX ADMIN — Medication 2: at 18:00

## 2018-03-18 RX ADMIN — CHLORHEXIDINE GLUCONATE 15 MILLILITER(S): 213 SOLUTION TOPICAL at 06:52

## 2018-03-18 RX ADMIN — HUMAN INSULIN 3 UNIT(S): 100 INJECTION, SUSPENSION SUBCUTANEOUS at 00:19

## 2018-03-18 RX ADMIN — Medication 5 MILLIGRAM(S): at 19:05

## 2018-03-18 RX ADMIN — NYSTATIN CREAM 1 APPLICATION(S): 100000 CREAM TOPICAL at 06:52

## 2018-03-18 NOTE — PROGRESS NOTE ADULT - PROBLEM SELECTOR PLAN 8
- continue feeds via peg, Nepro @ 40cc/hr x 24 hrs recommended by nutrition - continue feeds via peg, Nepro @ 40cc/hr x 24 hrs recommended by nutrition  Vomited once, will have GI gi, ? reglan and nutrition fu

## 2018-03-18 NOTE — PROGRESS NOTE ADULT - SUBJECTIVE AND OBJECTIVE BOX
Patient is a 68y old  Female who presents with a chief complaint of Fever, total body weakness (02 Feb 2018 13:44)      Interval Events:    REVIEW OF SYSTEMS:  [ ] Positive  [ ] All other systems negative  [ ] Unable to assess ROS because ________    Vital Signs Last 24 Hrs  T(C): 36.9 (03-17-18 @ 20:37), Max: 36.9 (03-17-18 @ 20:37)  T(F): 98.4 (03-17-18 @ 20:37), Max: 98.4 (03-17-18 @ 20:37)  HR: 90 (03-18-18 @ 06:43) (81 - 101)  BP: 151/76 (03-17-18 @ 20:37) (128/72 - 151/76)  RR: 18 (03-17-18 @ 20:47) (18 - 24)  SpO2: 96% (03-18-18 @ 06:43) (96% - 100%)    PHYSICAL EXAM:  HEENT:   [ ]Tracheostomy:  [ ]Pupils equal  [ ]No oral lesions  [ ]Abnormal    SKIN  [ ]No Rash  [ ] Abnormal  [ ] pressure    CARDIAC  [ ]Regular  [ ]Abnormal    PULMONARY  [ ]Bilateral Clear Breath Sounds  [ ]Normal Excursion  [ ]Abnormal    GI  [ ]PEG      [ ] +BS		              [ ]Soft, nondistended, nontender	  [ ]Abnormal    MUSCULOSKELETAL                                   [ ]Bedbound                 [ ]Abnormal    [ ]Ambulatory/OOB to chair                           EXTREMITIES                                         [ ]Normal  [ ]Edema                           NEUROLOGIC  [ ] Normal, non focal  [ ] Focal findings:    PSYCHIATRIC  [ ]Alert and appropriate  [ ] Sedated	 [ ]Agitated    :  Wheeler: [ ] YES, if yes: Date of Placement                        [  ] NO      LINES: TLC [ ]YES, if yes: Date of Placement                    [ ] No    HOSPITAL MEDICATIONS:  MEDICATIONS  (STANDING):  ALBUTerol/ipratropium for Nebulization 3 milliLiter(s) Nebulizer every 6 hours  aspirin  chewable 81 milliGRAM(s) Oral daily  calamine Lotion 1 Application(s) Topical daily  ceFAZolin   IVPB 2000 milliGRAM(s) IV Intermittent <User Schedule>  ceFAZolin   IVPB 3000 milliGRAM(s) IV Intermittent <User Schedule>  dextrose 5%. 1000 milliLiter(s) (50 mL/Hr) IV Continuous <Continuous>  dextrose 50% Injectable 12.5 Gram(s) IV Push once  dextrose 50% Injectable 25 Gram(s) IV Push once  epoetin odalis Injectable 79747 Unit(s) SubCutaneous <User Schedule>  heparin  Injectable 5000 Unit(s) SubCutaneous every 8 hours  insulin lispro (HumaLOG) corrective regimen sliding scale   SubCutaneous every 6 hours  insulin NPH human recombinant 3 Unit(s) SubCutaneous every 6 hours  labetalol 100 milliGRAM(s) Oral two times a day  nystatin Powder 1 Application(s) Topical two times a day  polyethylene glycol 3350 17 Gram(s) Oral daily  senna Syrup 10 milliLiter(s) Oral at bedtime  sodium chloride 3%  Inhalation 3 milliLiter(s) Inhalation four times a day    MEDICATIONS  (PRN):  acetaminophen    Suspension. 650 milliGRAM(s) Oral every 6 hours PRN Mild Pain (1 - 3)  chlorhexidine 0.12% Liquid 15 milliLiter(s) Swish and Spit every 4 hours PRN mouth hygiene  glucagon  Injectable 1 milliGRAM(s) IntraMuscular once PRN Glucose LESS THAN 70 milligrams/deciliter  hydrocortisone 1% Ointment 1 Application(s) Topical every 8 hours PRN Rash and/or Itching      LABS:                        7.7    7.67  )-----------( 358      ( 17 Mar 2018 08:29 )             25.7     03-17    134<L>  |  93<L>  |  16  ----------------------------<  253<H>  3.0<L>   |  26  |  1.76<H>    Ca    9.0      17 Mar 2018 06:16  Phos  3.7     03-17  Mg     2.1     03-17              CAPILLARY BLOOD GLUCOSE    MICROBIOLOGY:     RADIOLOGY:  [ ] Reviewed and interpreted by me    Mode: VENT IS OFF Patient is a 68y old  Female who presents with a chief complaint of Fever, total body weakness (02 Feb 2018 13:44)      Interval Events: vomited once     REVIEW OF SYSTEMS:  [ ] Positive  [x ] All other systems negative  [ ] Unable to assess ROS because ________    Vital Signs Last 24 Hrs  T(C): 36.9 (03-17-18 @ 20:37), Max: 36.9 (03-17-18 @ 20:37)  T(F): 98.4 (03-17-18 @ 20:37), Max: 98.4 (03-17-18 @ 20:37)  HR: 90 (03-18-18 @ 06:43) (81 - 101)  BP: 151/76 (03-17-18 @ 20:37) (128/72 - 151/76)  RR: 18 (03-17-18 @ 20:47) (18 - 24)  SpO2: 96% (03-18-18 @ 06:43) (96% - 100%)    PHYSICAL EXAM:  HEENT:   [x ]Tracheostomy:  [ ]Pupils equal  [ ]No oral lesions  [ ]Abnormal    SKIN  [x ]No Rash  [ ] Abnormal  [ ] pressure    CARDIAC  [ ]Regular  [ ]Abnormal    PULMONARY  [x ]Bilateral Clear Breath Sounds  [ ]Normal Excursion  [ ]Abnormal    GI  [ x]PEG      [x ] +BS		              [x ]Soft, nondistended, nontender	  [ ]Abnormal    MUSCULOSKELETAL                                   [ ]Bedbound                 [ ]Abnormal    [x ]Ambulatory/OOB to chair                           EXTREMITIES                                         [ ]Normal  [x ]Edema                           NEUROLOGIC  [x ] Normal, non focal  [ ] Focal findings:    PSYCHIATRIC  [x ]Alert and appropriate  [ ] Sedated	 [ ]Agitated    :  Wheeler: [ ] YES, if yes: Date of Placement                        [x  ] NO      LINES: TLC [ ]YES, if yes: Date of Placement                    [x ] No    HOSPITAL MEDICATIONS:  MEDICATIONS  (STANDING):  ALBUTerol/ipratropium for Nebulization 3 milliLiter(s) Nebulizer every 6 hours  aspirin  chewable 81 milliGRAM(s) Oral daily  calamine Lotion 1 Application(s) Topical daily  ceFAZolin   IVPB 2000 milliGRAM(s) IV Intermittent <User Schedule>  ceFAZolin   IVPB 3000 milliGRAM(s) IV Intermittent <User Schedule>  dextrose 5%. 1000 milliLiter(s) (50 mL/Hr) IV Continuous <Continuous>  dextrose 50% Injectable 12.5 Gram(s) IV Push once  dextrose 50% Injectable 25 Gram(s) IV Push once  epoetin odalis Injectable 29013 Unit(s) SubCutaneous <User Schedule>  heparin  Injectable 5000 Unit(s) SubCutaneous every 8 hours  insulin lispro (HumaLOG) corrective regimen sliding scale   SubCutaneous every 6 hours  insulin NPH human recombinant 3 Unit(s) SubCutaneous every 6 hours  labetalol 100 milliGRAM(s) Oral two times a day  nystatin Powder 1 Application(s) Topical two times a day  polyethylene glycol 3350 17 Gram(s) Oral daily  senna Syrup 10 milliLiter(s) Oral at bedtime  sodium chloride 3%  Inhalation 3 milliLiter(s) Inhalation four times a day    MEDICATIONS  (PRN):  acetaminophen    Suspension. 650 milliGRAM(s) Oral every 6 hours PRN Mild Pain (1 - 3)  chlorhexidine 0.12% Liquid 15 milliLiter(s) Swish and Spit every 4 hours PRN mouth hygiene  glucagon  Injectable 1 milliGRAM(s) IntraMuscular once PRN Glucose LESS THAN 70 milligrams/deciliter  hydrocortisone 1% Ointment 1 Application(s) Topical every 8 hours PRN Rash and/or Itching      LABS:                        7.7    7.67  )-----------( 358      ( 17 Mar 2018 08:29 )             25.7     03-17    134<L>  |  93<L>  |  16  ----------------------------<  253<H>  3.0<L>   |  26  |  1.76<H>    Ca    9.0      17 Mar 2018 06:16  Phos  3.7     03-17  Mg     2.1     03-17              CAPILLARY BLOOD GLUCOSE    MICROBIOLOGY:     RADIOLOGY:  [ ] Reviewed and interpreted by me    Mode: VENT IS OFF

## 2018-03-18 NOTE — PROVIDER CONTACT NOTE (OTHER) - SITUATION
daughter stating pt vomiting feeds wants to know if she is vomiting from mouth or trach site. suctioned pt trach and mouth no feeds found within trach secretions, feed residual only found in pt mouth

## 2018-03-18 NOTE — PROGRESS NOTE ADULT - ATTENDING COMMENTS
Agree with above  Transferred from MICU o/n--to RCU for continued care  Continue to attempt wean to TC as able  TF via PEG, ancef to continue until 3/22 for MSSA    Jkarp [phone] Agree with above  Transferred from MICU o/n--to RCU for continued care  Continue to attempt wean to TC as able, hopefully TC ATC   TF via PEG, vomitus noted overnight, will recall GI and nutrition to reevaluate for motility agent and formula change respectively  ancef to continue until 3/22 for MSSA    Rito [phone]

## 2018-03-19 DIAGNOSIS — E11.65 TYPE 2 DIABETES MELLITUS WITH HYPERGLYCEMIA: ICD-10-CM

## 2018-03-19 LAB
ANION GAP SERPL CALC-SCNC: 13 MMOL/L — SIGNIFICANT CHANGE UP (ref 5–17)
BASE EXCESS BLDA CALC-SCNC: 3.3 MMOL/L — HIGH (ref -2–2)
BUN SERPL-MCNC: 46 MG/DL — HIGH (ref 7–23)
CALCIUM SERPL-MCNC: 9.5 MG/DL — SIGNIFICANT CHANGE UP (ref 8.4–10.5)
CHLORIDE SERPL-SCNC: 94 MMOL/L — LOW (ref 96–108)
CO2 BLDA-SCNC: 29 MMOL/L — SIGNIFICANT CHANGE UP (ref 22–30)
CO2 SERPL-SCNC: 28 MMOL/L — SIGNIFICANT CHANGE UP (ref 22–31)
CREAT SERPL-MCNC: 3.32 MG/DL — HIGH (ref 0.5–1.3)
GAS PNL BLDA: SIGNIFICANT CHANGE UP
GLUCOSE BLDC GLUCOMTR-MCNC: 245 MG/DL — HIGH (ref 70–99)
GLUCOSE BLDC GLUCOMTR-MCNC: 268 MG/DL — HIGH (ref 70–99)
GLUCOSE BLDC GLUCOMTR-MCNC: 272 MG/DL — HIGH (ref 70–99)
GLUCOSE BLDC GLUCOMTR-MCNC: 286 MG/DL — HIGH (ref 70–99)
GLUCOSE SERPL-MCNC: 280 MG/DL — HIGH (ref 70–99)
HCO3 BLDA-SCNC: 28 MMOL/L — SIGNIFICANT CHANGE UP (ref 21–29)
HCT VFR BLD CALC: 24.6 % — LOW (ref 34.5–45)
HGB BLD-MCNC: 7.7 G/DL — LOW (ref 11.5–15.5)
HOROWITZ INDEX BLDA+IHG-RTO: 30 — SIGNIFICANT CHANGE UP
MAGNESIUM SERPL-MCNC: 2.4 MG/DL — SIGNIFICANT CHANGE UP (ref 1.6–2.6)
MCHC RBC-ENTMCNC: 30.1 PG — SIGNIFICANT CHANGE UP (ref 27–34)
MCHC RBC-ENTMCNC: 31.5 GM/DL — LOW (ref 32–36)
MCV RBC AUTO: 95.7 FL — SIGNIFICANT CHANGE UP (ref 80–100)
PCO2 BLDA: 47 MMHG — HIGH (ref 32–46)
PH BLDA: 7.4 — SIGNIFICANT CHANGE UP (ref 7.35–7.45)
PHOSPHATE SERPL-MCNC: 4.9 MG/DL — HIGH (ref 2.5–4.5)
PLATELET # BLD AUTO: 356 K/UL — SIGNIFICANT CHANGE UP (ref 150–400)
PO2 BLDA: 95 MMHG — SIGNIFICANT CHANGE UP (ref 74–108)
POTASSIUM SERPL-MCNC: 3.4 MMOL/L — LOW (ref 3.5–5.3)
POTASSIUM SERPL-SCNC: 3.4 MMOL/L — LOW (ref 3.5–5.3)
RBC # BLD: 2.57 M/UL — LOW (ref 3.8–5.2)
RBC # FLD: 16.6 % — HIGH (ref 10.3–14.5)
SAO2 % BLDA: 98 % — HIGH (ref 92–96)
SODIUM SERPL-SCNC: 135 MMOL/L — SIGNIFICANT CHANGE UP (ref 135–145)
WBC # BLD: 5 K/UL — SIGNIFICANT CHANGE UP (ref 3.8–10.5)
WBC # FLD AUTO: 5 K/UL — SIGNIFICANT CHANGE UP (ref 3.8–10.5)

## 2018-03-19 PROCEDURE — 99232 SBSQ HOSP IP/OBS MODERATE 35: CPT

## 2018-03-19 PROCEDURE — 99291 CRITICAL CARE FIRST HOUR: CPT

## 2018-03-19 PROCEDURE — 99232 SBSQ HOSP IP/OBS MODERATE 35: CPT | Mod: GC

## 2018-03-19 RX ORDER — INSULIN LISPRO 100/ML
VIAL (ML) SUBCUTANEOUS EVERY 6 HOURS
Qty: 0 | Refills: 0 | Status: DISCONTINUED | OUTPATIENT
Start: 2018-03-19 | End: 2018-03-30

## 2018-03-19 RX ORDER — HUMAN INSULIN 100 [IU]/ML
6 INJECTION, SUSPENSION SUBCUTANEOUS EVERY 6 HOURS
Qty: 0 | Refills: 0 | Status: DISCONTINUED | OUTPATIENT
Start: 2018-03-19 | End: 2018-03-20

## 2018-03-19 RX ADMIN — SODIUM CHLORIDE 3 MILLILITER(S): 9 INJECTION INTRAMUSCULAR; INTRAVENOUS; SUBCUTANEOUS at 18:07

## 2018-03-19 RX ADMIN — ERYTHROPOIETIN 10000 UNIT(S): 10000 INJECTION, SOLUTION INTRAVENOUS; SUBCUTANEOUS at 18:34

## 2018-03-19 RX ADMIN — Medication 3 MILLILITER(S): at 23:11

## 2018-03-19 RX ADMIN — NYSTATIN CREAM 1 APPLICATION(S): 100000 CREAM TOPICAL at 05:23

## 2018-03-19 RX ADMIN — Medication 10 MILLIGRAM(S): at 05:23

## 2018-03-19 RX ADMIN — Medication 3 MILLILITER(S): at 06:03

## 2018-03-19 RX ADMIN — HEPARIN SODIUM 5000 UNIT(S): 5000 INJECTION INTRAVENOUS; SUBCUTANEOUS at 22:24

## 2018-03-19 RX ADMIN — CALAMINE 8% AND ZINC OXIDE 8% 1 APPLICATION(S): 160 LOTION TOPICAL at 12:22

## 2018-03-19 RX ADMIN — HEPARIN SODIUM 5000 UNIT(S): 5000 INJECTION INTRAVENOUS; SUBCUTANEOUS at 13:40

## 2018-03-19 RX ADMIN — Medication 6: at 12:21

## 2018-03-19 RX ADMIN — Medication 3: at 07:42

## 2018-03-19 RX ADMIN — HUMAN INSULIN 6 UNIT(S): 100 INJECTION, SUSPENSION SUBCUTANEOUS at 17:41

## 2018-03-19 RX ADMIN — HUMAN INSULIN 3 UNIT(S): 100 INJECTION, SUSPENSION SUBCUTANEOUS at 05:23

## 2018-03-19 RX ADMIN — SODIUM CHLORIDE 3 MILLILITER(S): 9 INJECTION INTRAMUSCULAR; INTRAVENOUS; SUBCUTANEOUS at 00:34

## 2018-03-19 RX ADMIN — SODIUM CHLORIDE 3 MILLILITER(S): 9 INJECTION INTRAMUSCULAR; INTRAVENOUS; SUBCUTANEOUS at 13:11

## 2018-03-19 RX ADMIN — HUMAN INSULIN 3 UNIT(S): 100 INJECTION, SUSPENSION SUBCUTANEOUS at 00:00

## 2018-03-19 RX ADMIN — SODIUM CHLORIDE 3 MILLILITER(S): 9 INJECTION INTRAMUSCULAR; INTRAVENOUS; SUBCUTANEOUS at 06:03

## 2018-03-19 RX ADMIN — Medication 100 MILLIGRAM(S): at 23:59

## 2018-03-19 RX ADMIN — Medication 200 MILLIGRAM(S): at 22:24

## 2018-03-19 RX ADMIN — Medication 3 MILLILITER(S): at 00:35

## 2018-03-19 RX ADMIN — Medication 4: at 17:40

## 2018-03-19 RX ADMIN — Medication 3 MILLILITER(S): at 13:10

## 2018-03-19 RX ADMIN — Medication 81 MILLIGRAM(S): at 11:07

## 2018-03-19 RX ADMIN — Medication 100 MILLIGRAM(S): at 05:22

## 2018-03-19 RX ADMIN — NYSTATIN CREAM 1 APPLICATION(S): 100000 CREAM TOPICAL at 17:41

## 2018-03-19 RX ADMIN — Medication 3 MILLILITER(S): at 18:07

## 2018-03-19 RX ADMIN — HEPARIN SODIUM 5000 UNIT(S): 5000 INJECTION INTRAVENOUS; SUBCUTANEOUS at 05:21

## 2018-03-19 RX ADMIN — SODIUM CHLORIDE 3 MILLILITER(S): 9 INJECTION INTRAMUSCULAR; INTRAVENOUS; SUBCUTANEOUS at 23:13

## 2018-03-19 RX ADMIN — HUMAN INSULIN 6 UNIT(S): 100 INJECTION, SUSPENSION SUBCUTANEOUS at 12:20

## 2018-03-19 NOTE — PROGRESS NOTE ADULT - PROBLEM SELECTOR PLAN 1
-test BG Q6h  -Increase NPH 6 units Q6H (hold if tube feeds off)  -Change Humalog moderate correction scale Q6h  discussed w/pt and team  pager: [phone]

## 2018-03-19 NOTE — PROGRESS NOTE ADULT - SUBJECTIVE AND OBJECTIVE BOX
Diabetes Follow up note:  Interval Hx:  69 y/o F with T2DM uncontrolled with neuropathy and ESRD now on HD on insulin, osteoporosis, HTN, here with acute respiratory failure 2/2 influenza s/p CVA and PEA arrest x 2, and MSSA bacteremia on month long tx for aspiration pna, re-intubated on 3.11.18 for hypoxia/respiratory distress s/p Trach and PEG. Hyperglycemic on current tube feed/nph regimen.     Review of Systems:  General: Pt nods yes/no to answers. C/o discomfort at trach site.   GI: Tolerating POs without any N/V/D/ABD PAIN.  CV: No CP/SOB  ENDO: No S&Sx of hypoglycemia  MEDS:    insulin lispro (HumaLOG) corrective regimen sliding scale   SubCutaneous every 6 hours  insulin NPH human recombinant 3 Unit(s) SubCutaneous every 6 hours    ceFAZolin   IVPB 2000 milliGRAM(s) IV Intermittent <User Schedule>  ceFAZolin   IVPB 3000 milliGRAM(s) IV Intermittent <User Schedule>    Allergies    doxycycline (Rash)  isoniazid (Rash)  NIFEdipine (Urticaria; Hives)  vitamin E (Short breath; Urticaria; Hives)    Intolerances      PE:  General: Female lying in bed. NAD.  Vital Signs Last 24 Hrs  T(C): 36.7 (19 Mar 2018 05:00), Max: 36.9 (18 Mar 2018 14:16)  T(F): 98 (19 Mar 2018 05:00), Max: 98.4 (18 Mar 2018 14:16)  HR: 88 (19 Mar 2018 09:12) (80 - 90)  BP: 142/67 (19 Mar 2018 05:00) (120/72 - 151/77)  BP(mean): --  RR: 18 (19 Mar 2018 09:10) (18 - 19)  SpO2: 99% (19 Mar 2018 09:12) (98% - 100%)  HEENT: Trach in place.   Abd: Soft, NT,ND, PEG in place.   Extremities: Warm. No edema.   Neuro: Awake. Follows commands. Able to nod yes/no to questions.     LABS:    POCT Blood Glucose.: 268 mg/dL (03-19-18 @ 07:34)  POCT Blood Glucose.: 272 mg/dL (03-19-18 @ 06:04)  POCT Blood Glucose.: 250 mg/dL (03-18-18 @ 23:09)  POCT Blood Glucose.: 246 mg/dL (03-18-18 @ 17:35)  POCT Blood Glucose.: 225 mg/dL (03-18-18 @ 11:47)  POCT Blood Glucose.: 179 mg/dL (03-18-18 @ 06:43)  POCT Blood Glucose.: 231 mg/dL (03-17-18 @ 23:55)  POCT Blood Glucose.: 269 mg/dL (03-17-18 @ 17:30)  POCT Blood Glucose.: 263 mg/dL (03-17-18 @ 11:36)  POCT Blood Glucose.: 227 mg/dL (03-17-18 @ 05:38)  POCT Blood Glucose.: 147 mg/dL (03-16-18 @ 23:25)  POCT Blood Glucose.: 180 mg/dL (03-16-18 @ 18:13)  POCT Blood Glucose.: 176 mg/dL (03-16-18 @ 11:26)                            7.7    5.0   )-----------( 356      ( 19 Mar 2018 07:38 )             24.6       03-19    135  |  94<L>  |  46<H>  ----------------------------<  280<H>  3.4<L>   |  28  |  3.32<H>    Ca    9.5      19 Mar 2018 07:38  Phos  4.9     03-19  Mg     2.4     03-19            Hemoglobin A1C, Whole Blood: 8.6 % <H> [4.0 - 5.6] (01-31-18 @ 07:15)  Hemoglobin A1C, Whole Blood: 8.7 % <H> [4.0 - 5.6] (01-31-18 @ 05:51)            Contact number: marcelino [phone] or [phone]

## 2018-03-19 NOTE — PROGRESS NOTE ADULT - ATTENDING COMMENTS
#RUSS- oliguric  ATN-   no improvement in renal function  monitoring bladder scan in MICU' ; hd today  #access- permcath  #anemia -epo tiw with HD;   check iron studies 67 yoF with breast cancer, HTN who p/w flu, went into respiratory arrest, then PEA arrest, s/p chest tube, TPA and intubation. Course c/b shock liver, RUSS, pneumoperitoneum, and elevated cardiac enzymes. Chest tubes and peritoneal tubes removed, and shock liver now resolved. Now with Staph aureus bacteremia. Pending possible PEG/trach. Pt continues to be anuric requiring HD with fluid removal.    RUSS: Likely ischemic ATN in the setting of shock/sepsis  Currently with no e/o renal recovery with rising creatinine  HD today   Access: PC present    Anemia: DAVID with HD    Vol/HTN: BP stable

## 2018-03-19 NOTE — PROVIDER CONTACT NOTE (OTHER) - SITUATION
Pt with increased HR throughout dialysis. HR decreased to 100 after dialysis but increased again 30mns after.

## 2018-03-19 NOTE — PROGRESS NOTE ADULT - SUBJECTIVE AND OBJECTIVE BOX
Patient is a 68y old  Female who presents with a chief complaint of Fever, total body weakness (02 Feb 2018 13:44)      Interval Events:    REVIEW OF SYSTEMS:  [ ] Positive  [ ] All other systems negative  [ ] Unable to assess ROS because ________    Vital Signs Last 24 Hrs  T(C): 36.7 (03-19-18 @ 05:00), Max: 36.9 (03-18-18 @ 14:16)  T(F): 98 (03-19-18 @ 05:00), Max: 98.4 (03-18-18 @ 14:16)  HR: 88 (03-19-18 @ 09:12) (80 - 90)  BP: 142/67 (03-19-18 @ 05:00) (120/72 - 151/77)  RR: 18 (03-19-18 @ 09:10) (18 - 19)  SpO2: 99% (03-19-18 @ 09:12) (98% - 100%)    PHYSICAL EXAM:  HEENT:   [ ]Tracheostomy:  [ ]Pupils equal  [ ]No oral lesions  [ ]Abnormal    SKIN  [ ]No Rash  [ ] Abnormal  [ ] pressure    CARDIAC  [ ]Regular  [ ]Abnormal    PULMONARY  [ ]Bilateral Clear Breath Sounds  [ ]Normal Excursion  [ ]Abnormal    GI  [ ]PEG      [ ] +BS		              [ ]Soft, nondistended, nontender	  [ ]Abnormal    MUSCULOSKELETAL                                   [ ]Bedbound                 [ ]Abnormal    [ ]Ambulatory/OOB to chair                           EXTREMITIES                                         [ ]Normal  [ ]Edema                           NEUROLOGIC  [ ] Normal, non focal  [ ] Focal findings:    PSYCHIATRIC  [ ]Alert and appropriate  [ ] Sedated	 [ ]Agitated    :  Wheeler: [ ] YES, if yes: Date of Placement                        [  ] NO      LINES: TLC [ ]YES, if yes: Date of Placement                    [ ] No    HOSPITAL MEDICATIONS:  MEDICATIONS  (STANDING):  ALBUTerol/ipratropium for Nebulization 3 milliLiter(s) Nebulizer every 6 hours  aspirin  chewable 81 milliGRAM(s) Oral daily  bisacodyl Suppository 10 milliGRAM(s) Rectal once  calamine Lotion 1 Application(s) Topical daily  ceFAZolin   IVPB 2000 milliGRAM(s) IV Intermittent <User Schedule>  ceFAZolin   IVPB 3000 milliGRAM(s) IV Intermittent <User Schedule>  dextrose 5%. 1000 milliLiter(s) (50 mL/Hr) IV Continuous <Continuous>  dextrose 50% Injectable 12.5 Gram(s) IV Push once  dextrose 50% Injectable 25 Gram(s) IV Push once  epoetin odalis Injectable 18370 Unit(s) SubCutaneous <User Schedule>  heparin  Injectable 5000 Unit(s) SubCutaneous every 8 hours  insulin lispro (HumaLOG) corrective regimen sliding scale   SubCutaneous every 6 hours  insulin NPH human recombinant 3 Unit(s) SubCutaneous every 6 hours  labetalol 100 milliGRAM(s) Oral two times a day  metoclopramide Injectable 10 milliGRAM(s) IV Push every 8 hours  nystatin Powder 1 Application(s) Topical two times a day  polyethylene glycol 3350 17 Gram(s) Oral two times a day  senna Syrup 10 milliLiter(s) Oral at bedtime  sodium chloride 3%  Inhalation 3 milliLiter(s) Inhalation four times a day    MEDICATIONS  (PRN):  acetaminophen    Suspension. 650 milliGRAM(s) Oral every 6 hours PRN Mild Pain (1 - 3)  chlorhexidine 0.12% Liquid 15 milliLiter(s) Swish and Spit every 4 hours PRN mouth hygiene  glucagon  Injectable 1 milliGRAM(s) IntraMuscular once PRN Glucose LESS THAN 70 milligrams/deciliter  hydrocortisone 1% Ointment 1 Application(s) Topical every 8 hours PRN Rash and/or Itching      LABS:                        7.7    5.0   )-----------( 356      ( 19 Mar 2018 07:38 )             24.6     03-19    135  |  94<L>  |  46<H>  ----------------------------<  280<H>  3.4<L>   |  28  |  3.32<H>    Ca    9.5      19 Mar 2018 07:38  Phos  4.9     03-19  Mg     2.4     03-19              CAPILLARY BLOOD GLUCOSE    MICROBIOLOGY:     RADIOLOGY:  [ ] Reviewed and interpreted by me    Mode: OFF  FiO2: 30 Patient is a 68y old  Female who presents with a chief complaint of Fever, total body weakness (02 Feb 2018 13:44)      Interval Events: none    REVIEW OF SYSTEMS:  [ ] Positive  [x ] All systems negative  [ ] Unable to assess ROS because ________    Vital Signs Last 24 Hrs  T(C): 36.7 (03-19-18 @ 05:00), Max: 36.9 (03-18-18 @ 14:16)  T(F): 98 (03-19-18 @ 05:00), Max: 98.4 (03-18-18 @ 14:16)  HR: 88 (03-19-18 @ 09:12) (80 - 90)  BP: 142/67 (03-19-18 @ 05:00) (120/72 - 151/77)  RR: 18 (03-19-18 @ 09:10) (18 - 19)  SpO2: 99% (03-19-18 @ 09:12) (98% - 100%)    PHYSICAL EXAM:  HEENT:   [ x]Tracheostomy:  [x ]Pupils equal  [x ]No oral lesions  [ ]Abnormal    SKIN  [x ]No Rash  [ ] Abnormal  [ ] pressure    CARDIAC  [ x]Regular  [ ]Abnormal    PULMONARY  [x ]Bilateral Clear Breath Sounds  [ ]Normal Excursion  [ ]Abnormal    GI  [x ]PEG      [x ] +BS		              [x ]Soft, nondistended, nontender	  [ ]Abnormal    MUSCULOSKELETAL                                   [ ]Bedbound                 [ ]Abnormal    [x ]OOB to chair                           EXTREMITIES                                         [x ]Normal  [ ]Edema                           NEUROLOGIC  [ ] Normal, non focal  [ ] Focal findings:  eyes open spontaneously follows commands  moves all extremities RUE>LUE, LUE>RLE    PSYCHIATRIC  [x ]Alert and appropriate  [ ] Sedated	 [ ]Agitated    :  Wheeler: [ ] YES, if yes: Date of Placement                        [ x ] NO      LINES:  R SC perm cath  and L SC mediport  [x ]YES, if yes: Date of Placement                    [x ] No    HOSPITAL MEDICATIONS:  MEDICATIONS  (STANDING):  ALBUTerol/ipratropium for Nebulization 3 milliLiter(s) Nebulizer every 6 hours  aspirin  chewable 81 milliGRAM(s) Oral daily  bisacodyl Suppository 10 milliGRAM(s) Rectal once  calamine Lotion 1 Application(s) Topical daily  ceFAZolin   IVPB 2000 milliGRAM(s) IV Intermittent <User Schedule>  ceFAZolin   IVPB 3000 milliGRAM(s) IV Intermittent <User Schedule>  dextrose 5%. 1000 milliLiter(s) (50 mL/Hr) IV Continuous <Continuous>  dextrose 50% Injectable 12.5 Gram(s) IV Push once  dextrose 50% Injectable 25 Gram(s) IV Push once  epoetin odalis Injectable 77704 Unit(s) SubCutaneous <User Schedule>  heparin  Injectable 5000 Unit(s) SubCutaneous every 8 hours  insulin lispro (HumaLOG) corrective regimen sliding scale   SubCutaneous every 6 hours  insulin NPH human recombinant 3 Unit(s) SubCutaneous every 6 hours  labetalol 100 milliGRAM(s) Oral two times a day  metoclopramide Injectable 10 milliGRAM(s) IV Push every 8 hours  nystatin Powder 1 Application(s) Topical two times a day  polyethylene glycol 3350 17 Gram(s) Oral two times a day  senna Syrup 10 milliLiter(s) Oral at bedtime  sodium chloride 3%  Inhalation 3 milliLiter(s) Inhalation four times a day    MEDICATIONS  (PRN):  acetaminophen    Suspension. 650 milliGRAM(s) Oral every 6 hours PRN Mild Pain (1 - 3)  chlorhexidine 0.12% Liquid 15 milliLiter(s) Swish and Spit every 4 hours PRN mouth hygiene  glucagon  Injectable 1 milliGRAM(s) IntraMuscular once PRN Glucose LESS THAN 70 milligrams/deciliter  hydrocortisone 1% Ointment 1 Application(s) Topical every 8 hours PRN Rash and/or Itching      LABS:                        7.7    5.0   )-----------( 356      ( 19 Mar 2018 07:38 )             24.6     03-19    135  |  94<L>  |  46<H>  ----------------------------<  280<H>  3.4<L>   |  28  |  3.32<H>    Ca    9.5      19 Mar 2018 07:38  Phos  4.9     03-19  Mg     2.4     03-19              CAPILLARY BLOOD GLUCOSE    MICROBIOLOGY:     RADIOLOGY:  [ ] Reviewed and interpreted by me    Mode: OFF  FiO2: 30

## 2018-03-19 NOTE — PROGRESS NOTE ADULT - SUBJECTIVE AND OBJECTIVE BOX
[facility] Division of Kidney Diseases & Hypertension  FOLLOW UP NOTE  [phone]--------------------------------------------------------------------------------  Chief Complaint:Cardiac arrest      24 hour events/subjective:    No acute events noted   Last HD was on 3/16    PAST HISTORY  --------------------------------------------------------------------------------  No significant changes to PMH, PSH, FHx, SHx, unless otherwise noted    ALLERGIES & MEDICATIONS  --------------------------------------------------------------------------------  Allergies    doxycycline (Rash)  isoniazid (Rash)  NIFEdipine (Urticaria; Hives)  vitamin E (Short breath; Urticaria; Hives)    Intolerances      Standing Inpatient Medications  ALBUTerol/ipratropium for Nebulization 3 milliLiter(s) Nebulizer every 6 hours  aspirin  chewable 81 milliGRAM(s) Oral daily  bisacodyl Suppository 10 milliGRAM(s) Rectal once  calamine Lotion 1 Application(s) Topical daily  ceFAZolin   IVPB 2000 milliGRAM(s) IV Intermittent <User Schedule>  ceFAZolin   IVPB 3000 milliGRAM(s) IV Intermittent <User Schedule>  dextrose 5%. 1000 milliLiter(s) IV Continuous <Continuous>  dextrose 50% Injectable 12.5 Gram(s) IV Push once  dextrose 50% Injectable 25 Gram(s) IV Push once  epoetin odalis Injectable 43100 Unit(s) SubCutaneous <User Schedule>  heparin  Injectable 5000 Unit(s) SubCutaneous every 8 hours  insulin lispro (HumaLOG) corrective regimen sliding scale   SubCutaneous every 6 hours  insulin NPH human recombinant 3 Unit(s) SubCutaneous every 6 hours  labetalol 100 milliGRAM(s) Oral two times a day  metoclopramide Injectable 10 milliGRAM(s) IV Push every 8 hours  nystatin Powder 1 Application(s) Topical two times a day  polyethylene glycol 3350 17 Gram(s) Oral two times a day  senna Syrup 10 milliLiter(s) Oral at bedtime  sodium chloride 3%  Inhalation 3 milliLiter(s) Inhalation four times a day    PRN Inpatient Medications  acetaminophen    Suspension. 650 milliGRAM(s) Oral every 6 hours PRN  chlorhexidine 0.12% Liquid 15 milliLiter(s) Swish and Spit every 4 hours PRN  glucagon  Injectable 1 milliGRAM(s) IntraMuscular once PRN  hydrocortisone 1% Ointment 1 Application(s) Topical every 8 hours PRN      REVIEW OF SYSTEMS  --------------------------------------------------------------------------------  Unable to obtain.     VITALS/PHYSICAL EXAM  --------------------------------------------------------------------------------  T(C): 36.7 (03-19-18 @ 05:00), Max: 36.9 (03-18-18 @ 14:16)  HR: 88 (03-19-18 @ 09:12) (80 - 90)  BP: 142/67 (03-19-18 @ 05:00) (120/72 - 151/77)  RR: 18 (03-19-18 @ 09:10) (18 - 19)  SpO2: 99% (03-19-18 @ 09:12) (98% - 100%)  Wt(kg): --    Weight (kg): 70.4 (03-18-18 @ 11:29)      03-18-18 @ 07:01  -  03-19-18 @ 07:00  --------------------------------------------------------  IN: 1380 mL / OUT: 0 mL / NET: 1380 mL      Physical Exam:    Gen: Intubated, on mechanical ventilator   	HEENT: PERRL, supple neck, no jvp  	Pulm: CTA B/L  	CV: RRR, S1S2; no rub  	Back: No spinal or CVA tenderness; no sacral edema  	Abd: +BS, soft, nontender/nondistended  	: No suprapubic tenderness  	Ext: no edema  	Neuro: Sedated  	Skin: Warm, without rashes  	Vascular access: tunneled HD catheter present.       LABS/STUDIES  --------------------------------------------------------------------------------              7.7    5.0   >-----------<  356      [03-19-18 @ 07:38]              24.6     135  |  94  |  46  ----------------------------<  280      [03-19-18 @ 07:38]  3.4   |  28  |  3.32        Ca     9.5     [03-19-18 @ 07:38]      Mg     2.4     [03-19-18 @ 07:38]      Phos  4.9     [03-19-18 @ 07:38]            Creatinine Trend:  SCr 3.32 [03-19 @ 07:38]  SCr 2.66 [03-18 @ 06:54]  SCr 1.76 [03-17 @ 06:16]  SCr 2.19 [03-15 @ 22:51]  SCr 1.33 [03-14 @ 23:18]

## 2018-03-19 NOTE — CHART NOTE - NSCHARTNOTEFT_GEN_A_CORE
69 YO female seen for nutrition consult for tube feed assessment on RCU. PMH of HTN, T2DM, Breast CA with complicated medical course including MICU admission x2, acute respiratory failure, and new ESRD requiring HD. Last HD 3/16, net 0.  MICU readm 3/11 for aspiration PNA. S/p trach and PEG.     Source: Patient [ ]    Family [ ]     other [X]: Medical chart; RN; Previous RD notes    Diet : Nepro 40 mLx 24 hours via PEG (provides 720mL total volume; 1296 kcals; 58.32g protein; 719mL free water; which provides 18kcals/kg, .8g/kg protein).     Patient reports [ ] nausea  [ ] vomiting [ ] diarrhea [ ] constipation  [ ]chewing problems [ ] swallowing issues  [ ] other: Per RN report, on 3/18 pt had vomited during early feeds and RN held feeds until morning (received total of 480mL 3/18). RN reports this has since resolved, pt is tolerating current TF regimen, received 800mL total 3/19. +BM this morning. Per flowsheet, 3 episodes of large, loose stool last night/this am. No other GI issues at this time.     Enteral /Parenteral Nutrition: Nepro 40 mLx 24 hours via PEG (provides 720mL total volume; 1296 kcals; 58.32g protein; 719mL free water; which provides 18kcals/kg, .8g/kg protein).     Current Weight:  3/16 160.2. 3/15 156.5, 3/14 167.5, 3/13 157, 3/12 165, 3/10 156, dosing wt 1/30 165lb   wt range 151-171 since adm, pt on HD, no edema noted. Will continue to monitor.   % Weight Change    Pertinent Medications: MEDICATIONS  (STANDING):  ALBUTerol/ipratropium for Nebulization 3 milliLiter(s) Nebulizer every 6 hours  aspirin  chewable 81 milliGRAM(s) Oral daily  bisacodyl Suppository 10 milliGRAM(s) Rectal once  calamine Lotion 1 Application(s) Topical daily  ceFAZolin   IVPB 2000 milliGRAM(s) IV Intermittent <User Schedule>  ceFAZolin   IVPB 3000 milliGRAM(s) IV Intermittent <User Schedule>  dextrose 5%. 1000 milliLiter(s) (50 mL/Hr) IV Continuous <Continuous>  dextrose 50% Injectable 12.5 Gram(s) IV Push once  dextrose 50% Injectable 25 Gram(s) IV Push once  epoetin odalis Injectable 69147 Unit(s) SubCutaneous <User Schedule>  heparin  Injectable 5000 Unit(s) SubCutaneous every 8 hours  insulin lispro (HumaLOG) corrective regimen sliding scale   SubCutaneous every 6 hours  insulin NPH human recombinant 3 Unit(s) SubCutaneous every 6 hours  labetalol 100 milliGRAM(s) Oral two times a day  metoclopramide Injectable 10 milliGRAM(s) IV Push every 8 hours  nystatin Powder 1 Application(s) Topical two times a day  polyethylene glycol 3350 17 Gram(s) Oral two times a day  senna Syrup 10 milliLiter(s) Oral at bedtime  sodium chloride 3%  Inhalation 3 milliLiter(s) Inhalation four times a day    MEDICATIONS  (PRN):  acetaminophen    Suspension. 650 milliGRAM(s) Oral every 6 hours PRN Mild Pain (1 - 3)  chlorhexidine 0.12% Liquid 15 milliLiter(s) Swish and Spit every 4 hours PRN mouth hygiene  glucagon  Injectable 1 milliGRAM(s) IntraMuscular once PRN Glucose LESS THAN 70 milligrams/deciliter  hydrocortisone 1% Ointment 1 Application(s) Topical every 8 hours PRN Rash and/or Itching    Pertinent Labs:  03-19 Na135 mmol/L Glu 280 mg/dL<H> K+ 3.4 mmol/L<L> Cr  3.32 mg/dL<H> BUN 46 mg/dL<H> 03-19 Phos 4.9 mg/dL<H> 03-15 Alb 2.5 g/dL<L>    Skin: L wrist skin tears, L posterior axilla wound, no pressure injuries at this time, no edema noted.     Estimated Needs:   [X] no change since previous assessment    Previous Nutrition Diagnosis:  [x ] Increased Nutrient Needs, protein     Nutrition Diagnosis is [X] ongoing     New Nutrition Diagnosis: [x] not applicable    Recommend  1. Nepro at 55 cc/hr x 18 hrs provides 1782 kcals, 80 gm protein, 720cc free water for 25kcals/kg, 1.1 g/kg protein using dosing weight 70.4kg.   2. Consider discontinuing bowel regimen in presence of loose BMs, per MD discretion.   3. Continue to monitor tolerance, residuals, BMs, skin integrity, and weights.     Monitoring and Evaluation:   [X] Tolerance to diet prescription [X] weights [X] follow up per protocol  [X] other: RD to remain available as needed. 67 YO female seen for nutrition consult for tube feed assessment on RCU. PMH of HTN, T2DM, Breast CA with complicated medical course including MICU admission x2, acute respiratory failure, and new ESRD requiring HD. Last HD 3/16, net 0.  MICU readm 3/11 for aspiration PNA. S/p trach and PEG.     Source: Patient [ ]    Family [ ]     other [X]: Medical chart; RN; Previous RD notes    Diet : NPO with Enteral     Patient reports [ ] nausea  [ ] vomiting [ ] diarrhea [ ] constipation  [ ]chewing problems [ ] swallowing issues  [ ] other: Per RN report, on 3/18 pt had vomited during early feeds and RN held feeds until morning (received total of 480mL 3/18). RN reports this has since resolved, pt is tolerating current TF regimen, received 880mL total 3/19. +BM this morning. Per flowsheet, 3 episodes of large, loose stool last night/this am. No other GI issues at this time.     Enteral /Parenteral Nutrition: Nepro 40 mLx 18 hours via PEG (provides 720mL total volume; 1296 kcals; 58g protein; 719mL free water; which provides 18kcals/kg, .8g/kg protein)    Current Weight:  3/16 160.2. 3/15 156.5, 3/14 167.5, 3/13 157, 3/12 165, 3/10 156, dosing wt 1/30 165lb   wt range 151-171 since adm, pt on HD, no edema noted. Will continue to monitor.   % Weight Change    Pertinent Medications: MEDICATIONS  (STANDING):  ALBUTerol/ipratropium for Nebulization 3 milliLiter(s) Nebulizer every 6 hours  aspirin  chewable 81 milliGRAM(s) Oral daily  bisacodyl Suppository 10 milliGRAM(s) Rectal once  calamine Lotion 1 Application(s) Topical daily  ceFAZolin   IVPB 2000 milliGRAM(s) IV Intermittent <User Schedule>  ceFAZolin   IVPB 3000 milliGRAM(s) IV Intermittent <User Schedule>  dextrose 5%. 1000 milliLiter(s) (50 mL/Hr) IV Continuous <Continuous>  dextrose 50% Injectable 12.5 Gram(s) IV Push once  dextrose 50% Injectable 25 Gram(s) IV Push once  epoetin odalis Injectable 82359 Unit(s) SubCutaneous <User Schedule>  heparin  Injectable 5000 Unit(s) SubCutaneous every 8 hours  insulin lispro (HumaLOG) corrective regimen sliding scale   SubCutaneous every 6 hours  insulin NPH human recombinant 3 Unit(s) SubCutaneous every 6 hours  labetalol 100 milliGRAM(s) Oral two times a day  metoclopramide Injectable 10 milliGRAM(s) IV Push every 8 hours  nystatin Powder 1 Application(s) Topical two times a day  polyethylene glycol 3350 17 Gram(s) Oral two times a day  senna Syrup 10 milliLiter(s) Oral at bedtime  sodium chloride 3%  Inhalation 3 milliLiter(s) Inhalation four times a day    MEDICATIONS  (PRN):  acetaminophen    Suspension. 650 milliGRAM(s) Oral every 6 hours PRN Mild Pain (1 - 3)  chlorhexidine 0.12% Liquid 15 milliLiter(s) Swish and Spit every 4 hours PRN mouth hygiene  glucagon  Injectable 1 milliGRAM(s) IntraMuscular once PRN Glucose LESS THAN 70 milligrams/deciliter  hydrocortisone 1% Ointment 1 Application(s) Topical every 8 hours PRN Rash and/or Itching    Pertinent Labs:  03-19 Na135 mmol/L Glu 280 mg/dL<H> K+ 3.4 mmol/L<L> Cr  3.32 mg/dL<H> BUN 46 mg/dL<H> 03-19 Phos 4.9 mg/dL<H> 03-15 Alb 2.5 g/dL<L>    Skin: L wrist skin tears, L posterior axilla wound, no pressure injuries at this time, no edema noted.     Estimated Needs:   [X] no change since previous assessment    Previous Nutrition Diagnosis:  [x] Increased Nutrient Needs, protein     Nutrition Diagnosis is [X] ongoing     New Nutrition Diagnosis: [x] not applicable    Recommend  1. Nepro at 55 cc/hr x 18 hrs provides 1782 kcals, 80 gm protein, 720cc free water for 25kcals/kg, 1.1 g/kg protein using dosing weight 70.4kg.   2. Consider discontinuing bowel regimen in presence of loose BMs, per MD discretion.   3. Continue to monitor tolerance, residuals, BMs, skin integrity, and weights.     Monitoring and Evaluation:   [X] Tolerance to diet prescription [X] weights [X] follow up per protocol  [X] other: RD to remain available as needed. 67 YO female seen for nutrition consult for tube feed assessment on RCU. PMH of HTN, T2DM, Breast CA with complicated medical course including MICU admission x2, acute respiratory failure, and new ESRD requiring HD. Last HD 3/16, net 0.  MICU readm 3/11 for aspiration PNA. S/p trach and PEG.     Source: Patient [ ]    Family [ ]     other [X]: Medical chart; RN; Previous RD notes    Diet: NPO with Enteral     Patient reports [ ] nausea  [ ] vomiting [ ] diarrhea [ ] constipation  [ ]chewing problems [ ] swallowing issues  [ ] other: Per RN report, on 3/18 pt had vomited during early feeds and RN held feeds until morning (received total of 480mL 3/18). RN reports this has since resolved, pt is tolerating current TF regimen, received 880mL total 3/19. +BM this morning. Per flowsheet, 3 episodes of large, loose stool last night/this am. No other GI issues at this time.     Enteral /Parenteral Nutrition: Nepro 40 mL x 18 hours via PEG (provides 720mL total volume; 1296 kcals; 58g protein; 719mL free water; which provides 18kcals/kg, .8g/kg protein)    Current Weight:  3/16 160.2. 3/15 156.5, 3/14 167.5, 3/13 157, 3/12 165, 3/10 156, dosing wt 1/30 165lb   wt range 151-171 since adm, pt on HD, no edema noted. Will continue to monitor.   % Weight Change    Pertinent Medications: MEDICATIONS  (STANDING):  ALBUTerol/ipratropium for Nebulization 3 milliLiter(s) Nebulizer every 6 hours  aspirin  chewable 81 milliGRAM(s) Oral daily  bisacodyl Suppository 10 milliGRAM(s) Rectal once  calamine Lotion 1 Application(s) Topical daily  ceFAZolin   IVPB 2000 milliGRAM(s) IV Intermittent <User Schedule>  ceFAZolin   IVPB 3000 milliGRAM(s) IV Intermittent <User Schedule>  dextrose 5%. 1000 milliLiter(s) (50 mL/Hr) IV Continuous <Continuous>  dextrose 50% Injectable 12.5 Gram(s) IV Push once  dextrose 50% Injectable 25 Gram(s) IV Push once  epoetin odalis Injectable 58417 Unit(s) SubCutaneous <User Schedule>  heparin  Injectable 5000 Unit(s) SubCutaneous every 8 hours  insulin lispro (HumaLOG) corrective regimen sliding scale   SubCutaneous every 6 hours  insulin NPH human recombinant 3 Unit(s) SubCutaneous every 6 hours  labetalol 100 milliGRAM(s) Oral two times a day  metoclopramide Injectable 10 milliGRAM(s) IV Push every 8 hours  nystatin Powder 1 Application(s) Topical two times a day  polyethylene glycol 3350 17 Gram(s) Oral two times a day  senna Syrup 10 milliLiter(s) Oral at bedtime  sodium chloride 3%  Inhalation 3 milliLiter(s) Inhalation four times a day    MEDICATIONS  (PRN):  acetaminophen    Suspension. 650 milliGRAM(s) Oral every 6 hours PRN Mild Pain (1 - 3)  chlorhexidine 0.12% Liquid 15 milliLiter(s) Swish and Spit every 4 hours PRN mouth hygiene  glucagon  Injectable 1 milliGRAM(s) IntraMuscular once PRN Glucose LESS THAN 70 milligrams/deciliter  hydrocortisone 1% Ointment 1 Application(s) Topical every 8 hours PRN Rash and/or Itching    Pertinent Labs:  03-19 Na135 mmol/L Glu 280 mg/dL<H> K+ 3.4 mmol/L<L> Cr  3.32 mg/dL<H> BUN 46 mg/dL<H> 03-19 Phos 4.9 mg/dL<H> 03-15 Alb 2.5 g/dL<L>    Skin: L wrist skin tears, L posterior axilla wound, no pressure injuries at this time, no edema noted.     Estimated Needs:   [X] no change since previous assessment    Previous Nutrition Diagnosis:  [x] Increased Nutrient Needs, protein     Nutrition Diagnosis is [X] ongoing     New Nutrition Diagnosis: [x] not applicable    Recommend  1. Nepro at 40 cc/hr x 24 hrs provides 1728 kcals, 78 g protein, 698cc free water for 25kcals/kg, 1.1 g/kg protein using dosing weight 70.4kg.   2. Consider discontinuing bowel regimen in presence of loose BMs, per MD discretion.   3. Continue to monitor tolerance, residuals, BMs, skin integrity, and weights.     Monitoring and Evaluation:   [X] Tolerance to diet prescription [X] weights [X] follow up per protocol  [X] other: RD to remain available as needed.

## 2018-03-19 NOTE — PROGRESS NOTE ADULT - PROBLEM SELECTOR PLAN 1
from ATN in setting of cardiac arrest. Pt continues to be oligo-anuric, requiring dialysis. No evidence of renal recovery at present.  Patient s/p tunneled HD catheter placement  by IR . Last HD was done on 3/16. Labs reviewed from today; will plan for HD today. Monitor BMP daily.

## 2018-03-19 NOTE — PROGRESS NOTE ADULT - PROBLEM SELECTOR PLAN 5
2/2 asp PNA which has Resolved  Without excessive secretions  Trached on 3/14-Wean as able 2/2 asp PNA which has Resolved  Without excessive secretions  Trach on 3/14 TC ATC since 3/18. Will obtain ABG

## 2018-03-19 NOTE — PROGRESS NOTE ADULT - ASSESSMENT
69 y/o F with T2DM uncontrolled with neuropathy and ESRD now on HD on insulin, osteoporosis, HTN, here with acute respiratory failure 2/2 influenza s/p CVA and PEA arrest x 2, and MSSA bacteremia on month long tx for aspiration pna, re-intubated on 3.11.18 for hypoxia/respiratory distress now s/p Trach and PEG. BG values persistently elevated on present insulin regimen. Tolerating TFs. Will increase NPH and correction scale to improve glycemic control. May need further titration over next 24 hours. BG goal (100-180mg/dl).

## 2018-03-19 NOTE — PROGRESS NOTE ADULT - PROBLEM SELECTOR PLAN 8
- continue feeds via peg, Nepro @ 40cc/hr x 24 hrs recommended by nutrition  Vomited once, will have GI gi, ? reglan and nutrition fu will change feed rate per nutritionist   Frequent BMs, will dc laxatives and Reglan dose reduced

## 2018-03-19 NOTE — PROGRESS NOTE ADULT - ATTENDING COMMENTS
Pt is a 66 yo F with h/o HTN, DM and breast Ca originally presented 1/30 with fever found to have influenza and subsequently went into PEA arrest x 2 with ROSC. Code complicated by bilateral pneumothoraces s/p 3 chest tubes and paracentesis decompression of the abdomen and then pt found to have PE s/p tPA . In the MICU pt had RUSS requiring HD and Shock liver.  Pt had a MRI of head which showed watershed infarct and acute L occipital infarct. Pt was extubated on 2/8/17 and eventually pt transferred to [facility]. On 2/21 RRT called for worsening tachypnea, hypoxia and CXR: worsening pulm edema. ABG showed hypercapnia. Pt transferred/intubated to the ICU and found to have MSSA bacteremia and MSSA in the sputum. Pt extubated on 3/1. On 3/12 pt s/p hypoxic resp. failure/ s/p intubation. s/p trach and peg 3/14-15 respectively and then transferred back to RCU 3/16    Resp: Cont TC ATC/ Possible PMV in next few days  ID: Finish course of Ancef  CVS: Cont Labetalol  FEN: Cont enteral feeds  Endo: Pt hyperglycemic; NPH adjustment as per Endo  Renal: HD as per Renal  PT/OT  Social: Pt's  at the bedside and updated    CCT: 40 min

## 2018-03-20 LAB
ANION GAP SERPL CALC-SCNC: 11 MMOL/L — SIGNIFICANT CHANGE UP (ref 5–17)
BUN SERPL-MCNC: 17 MG/DL — SIGNIFICANT CHANGE UP (ref 7–23)
CALCIUM SERPL-MCNC: 9.2 MG/DL — SIGNIFICANT CHANGE UP (ref 8.4–10.5)
CHLORIDE SERPL-SCNC: 95 MMOL/L — LOW (ref 96–108)
CO2 SERPL-SCNC: 30 MMOL/L — SIGNIFICANT CHANGE UP (ref 22–31)
CREAT SERPL-MCNC: 1.72 MG/DL — HIGH (ref 0.5–1.3)
CULTURE RESULTS: SIGNIFICANT CHANGE UP
CULTURE RESULTS: SIGNIFICANT CHANGE UP
GLUCOSE BLDC GLUCOMTR-MCNC: 212 MG/DL — HIGH (ref 70–99)
GLUCOSE BLDC GLUCOMTR-MCNC: 217 MG/DL — HIGH (ref 70–99)
GLUCOSE BLDC GLUCOMTR-MCNC: 243 MG/DL — HIGH (ref 70–99)
GLUCOSE BLDC GLUCOMTR-MCNC: 255 MG/DL — HIGH (ref 70–99)
GLUCOSE BLDC GLUCOMTR-MCNC: 292 MG/DL — HIGH (ref 70–99)
GLUCOSE SERPL-MCNC: 224 MG/DL — HIGH (ref 70–99)
HCT VFR BLD CALC: 25.1 % — LOW (ref 34.5–45)
HGB BLD-MCNC: 7.9 G/DL — LOW (ref 11.5–15.5)
MAGNESIUM SERPL-MCNC: 2.1 MG/DL — SIGNIFICANT CHANGE UP (ref 1.6–2.6)
MCHC RBC-ENTMCNC: 30.1 PG — SIGNIFICANT CHANGE UP (ref 27–34)
MCHC RBC-ENTMCNC: 31.4 GM/DL — LOW (ref 32–36)
MCV RBC AUTO: 95.8 FL — SIGNIFICANT CHANGE UP (ref 80–100)
PHOSPHATE SERPL-MCNC: 2.6 MG/DL — SIGNIFICANT CHANGE UP (ref 2.5–4.5)
PLATELET # BLD AUTO: 343 K/UL — SIGNIFICANT CHANGE UP (ref 150–400)
POTASSIUM SERPL-MCNC: 3.9 MMOL/L — SIGNIFICANT CHANGE UP (ref 3.5–5.3)
POTASSIUM SERPL-SCNC: 3.9 MMOL/L — SIGNIFICANT CHANGE UP (ref 3.5–5.3)
RBC # BLD: 2.62 M/UL — LOW (ref 3.8–5.2)
RBC # FLD: 16.3 % — HIGH (ref 10.3–14.5)
SODIUM SERPL-SCNC: 136 MMOL/L — SIGNIFICANT CHANGE UP (ref 135–145)
SPECIMEN SOURCE: SIGNIFICANT CHANGE UP
SPECIMEN SOURCE: SIGNIFICANT CHANGE UP
WBC # BLD: 7.5 K/UL — SIGNIFICANT CHANGE UP (ref 3.8–10.5)
WBC # FLD AUTO: 7.5 K/UL — SIGNIFICANT CHANGE UP (ref 3.8–10.5)

## 2018-03-20 PROCEDURE — 99232 SBSQ HOSP IP/OBS MODERATE 35: CPT

## 2018-03-20 PROCEDURE — 99232 SBSQ HOSP IP/OBS MODERATE 35: CPT | Mod: GC

## 2018-03-20 PROCEDURE — 99291 CRITICAL CARE FIRST HOUR: CPT

## 2018-03-20 RX ORDER — METOCLOPRAMIDE HCL 10 MG
5 TABLET ORAL ONCE
Qty: 0 | Refills: 0 | Status: COMPLETED | OUTPATIENT
Start: 2018-03-20 | End: 2018-03-20

## 2018-03-20 RX ORDER — ACETAMINOPHEN 500 MG
650 TABLET ORAL ONCE
Qty: 0 | Refills: 0 | Status: COMPLETED | OUTPATIENT
Start: 2018-03-20 | End: 2018-03-20

## 2018-03-20 RX ORDER — NYSTATIN CREAM 100000 [USP'U]/G
1 CREAM TOPICAL
Qty: 0 | Refills: 0 | Status: DISCONTINUED | OUTPATIENT
Start: 2018-03-20 | End: 2018-03-30

## 2018-03-20 RX ORDER — METOCLOPRAMIDE HCL 10 MG
5 TABLET ORAL EVERY 8 HOURS
Qty: 0 | Refills: 0 | Status: DISCONTINUED | OUTPATIENT
Start: 2018-03-20 | End: 2018-03-24

## 2018-03-20 RX ORDER — HUMAN INSULIN 100 [IU]/ML
8 INJECTION, SUSPENSION SUBCUTANEOUS
Qty: 0 | Refills: 0 | Status: DISCONTINUED | OUTPATIENT
Start: 2018-03-20 | End: 2018-03-22

## 2018-03-20 RX ORDER — INSULIN GLARGINE 100 [IU]/ML
20 INJECTION, SOLUTION SUBCUTANEOUS AT BEDTIME
Qty: 0 | Refills: 0 | Status: DISCONTINUED | OUTPATIENT
Start: 2018-03-20 | End: 2018-03-20

## 2018-03-20 RX ORDER — INSULIN GLARGINE 100 [IU]/ML
20 INJECTION, SOLUTION SUBCUTANEOUS ONCE
Qty: 0 | Refills: 0 | Status: DISCONTINUED | OUTPATIENT
Start: 2018-03-20 | End: 2018-03-20

## 2018-03-20 RX ORDER — METOCLOPRAMIDE HCL 10 MG
5 TABLET ORAL ONCE
Qty: 0 | Refills: 0 | Status: DISCONTINUED | OUTPATIENT
Start: 2018-03-20 | End: 2018-03-20

## 2018-03-20 RX ADMIN — NYSTATIN CREAM 1 APPLICATION(S): 100000 CREAM TOPICAL at 05:40

## 2018-03-20 RX ADMIN — CALAMINE 8% AND ZINC OXIDE 8% 1 APPLICATION(S): 160 LOTION TOPICAL at 11:16

## 2018-03-20 RX ADMIN — HUMAN INSULIN 8 UNIT(S): 100 INJECTION, SUSPENSION SUBCUTANEOUS at 18:29

## 2018-03-20 RX ADMIN — Medication 650 MILLIGRAM(S): at 01:13

## 2018-03-20 RX ADMIN — Medication 5 MILLIGRAM(S): at 21:57

## 2018-03-20 RX ADMIN — Medication 81 MILLIGRAM(S): at 11:15

## 2018-03-20 RX ADMIN — Medication 4: at 12:35

## 2018-03-20 RX ADMIN — HEPARIN SODIUM 5000 UNIT(S): 5000 INJECTION INTRAVENOUS; SUBCUTANEOUS at 21:57

## 2018-03-20 RX ADMIN — Medication 4: at 18:14

## 2018-03-20 RX ADMIN — HEPARIN SODIUM 5000 UNIT(S): 5000 INJECTION INTRAVENOUS; SUBCUTANEOUS at 05:39

## 2018-03-20 RX ADMIN — HEPARIN SODIUM 5000 UNIT(S): 5000 INJECTION INTRAVENOUS; SUBCUTANEOUS at 13:33

## 2018-03-20 RX ADMIN — Medication 3 MILLILITER(S): at 23:18

## 2018-03-20 RX ADMIN — NYSTATIN CREAM 1 APPLICATION(S): 100000 CREAM TOPICAL at 18:14

## 2018-03-20 RX ADMIN — Medication 3 MILLILITER(S): at 18:28

## 2018-03-20 RX ADMIN — SODIUM CHLORIDE 3 MILLILITER(S): 9 INJECTION INTRAMUSCULAR; INTRAVENOUS; SUBCUTANEOUS at 18:28

## 2018-03-20 RX ADMIN — Medication 5 MILLIGRAM(S): at 11:15

## 2018-03-20 RX ADMIN — Medication 100 MILLIGRAM(S): at 08:28

## 2018-03-20 RX ADMIN — Medication 3 MILLILITER(S): at 12:38

## 2018-03-20 RX ADMIN — Medication 3 MILLILITER(S): at 05:34

## 2018-03-20 RX ADMIN — SODIUM CHLORIDE 3 MILLILITER(S): 9 INJECTION INTRAMUSCULAR; INTRAVENOUS; SUBCUTANEOUS at 23:18

## 2018-03-20 RX ADMIN — Medication 100 MILLIGRAM(S): at 20:46

## 2018-03-20 RX ADMIN — Medication 6: at 00:09

## 2018-03-20 RX ADMIN — SODIUM CHLORIDE 3 MILLILITER(S): 9 INJECTION INTRAMUSCULAR; INTRAVENOUS; SUBCUTANEOUS at 12:39

## 2018-03-20 RX ADMIN — Medication 4: at 06:54

## 2018-03-20 RX ADMIN — CHLORHEXIDINE GLUCONATE 15 MILLILITER(S): 213 SOLUTION TOPICAL at 18:14

## 2018-03-20 RX ADMIN — SODIUM CHLORIDE 3 MILLILITER(S): 9 INJECTION INTRAMUSCULAR; INTRAVENOUS; SUBCUTANEOUS at 05:33

## 2018-03-20 NOTE — PROGRESS NOTE ADULT - PROBLEM SELECTOR PLAN 8
will change feed rate per nutritionist   Frequent BMs, will dc laxatives and Reglan dose reduced will change feed rate per nutritionist   Frequent BMs, will dc laxatives and Reglan dose reduced-  stool output greatly reduced

## 2018-03-20 NOTE — PROGRESS NOTE ADULT - PROBLEM SELECTOR PLAN 5
2/2 asp PNA which has Resolved  Without excessive secretions  Trach on 3/14 TC ATC since 3/18. Will obtain ABG

## 2018-03-20 NOTE — CHART NOTE - NSCHARTNOTEFT_GEN_A_CORE
Called by RN @ 22:48 for tachycardia of 115. Pt asymptomatic and denies SOB, palpitations, dyspnea. Pt was 30 mins s/p HD at the time of the event. Pt was not given 6pm dose of labetalol. VS: BP: 156/66, HR: 113 Temp: 98.6 oral.  Rectal temp: 100.8    - continue to monitor vital signs.   - Tylenol given  - Blood cultures   - continue with Abx  - Labetalol 100 mg BID. 6 pm dose given   - will endorse to the day team       Evelina SAUER  #[id number]

## 2018-03-20 NOTE — PROGRESS NOTE ADULT - ATTENDING COMMENTS
68 yoF with breast cancer, HTN who p/w flu, went into respiratory arrest, then PEA arrest, s/p chest tube, TPA and intubation. Course c/b shock liver, RUSS, pneumoperitoneum, and elevated cardiac enzymes. Chest tubes and peritoneal tubes removed, and shock liver now resolved. Now with Staph aureus bacteremia. Pending possible PEG/trach. Pt continues to be anuric requiring HD with fluid removal.    RUSS: Likely ischemic ATN in the setting of shock/sepsis  Currently with no e/o renal recovery    Stable labs  HD in am   Access: PC present    Anemia: DAVID with HD  Iron deficient : however holding iron in the setting of infection    Vol/HTN: BP stable .

## 2018-03-20 NOTE — PROGRESS NOTE ADULT - ATTENDING COMMENTS
Pt is a 68 yo F with h/o HTN, DM and breast Ca originally presented 1/30 with fever found to have influenza and subsequently went into PEA arrest x 2 with ROSC. Code complicated by bilateral pneumothoraces s/p 3 chest tubes and paracentesis decompression of the abdomen and then pt found to have PE s/p tPA . In the MICU pt had RUSS requiring HD and Shock liver.  Pt had a MRI of head which showed watershed infarct and acute L occipital infarct. Pt was extubated on 2/8/17 and eventually pt transferred to [facility]. On 2/21 RRT called for worsening tachypnea, hypoxia and CXR: worsening pulm edema. ABG showed hypercapnia. Pt transferred/intubated to the ICU and found to have MSSA bacteremia and MSSA in the sputum. Pt extubated on 3/1. On 3/12 pt s/p hypoxic resp. failure/ s/p intubation. s/p trach and peg 3/14-15 respectively and then transferred back to RCU 3/16. Today pt vomiting    Resp: Cont TC ATC/ Possible PMV in next few days  ID: Finish course of Ancef  CVS: Cont Labetalol  FEN: Hold enteral feeds 2 to vomiting  Endo: Pt still hyperglycemic but since pt is now NPO 2 to vomiting; cover with Lispro  Renal: HD as per Renal  PT/OT  Social: Pt's  at the bedside and updated    CCT: 35 min

## 2018-03-20 NOTE — CHART NOTE - NSCHARTNOTEFT_GEN_A_CORE
Called by RN for vomiting x 1 @ 04:51. Pt seen and examined at the bedside. As per pt's daughter, pt was coughing and then vomited. The vomit was non bloody non bilious, white, mucus like and didn't contain any tube feedings. Pt's tube feedings are on hold since 12 am. Pt denies N/V, dyspnea, SOB.     REVIEW OF SYSTEMS:    CONSTITUTIONAL: No weakness, fevers or chills  EYES/ENT: No visual changes;  No vertigo or throat pain   NECK: No pain or stiffness  RESPIRATORY: No cough, wheezing, hemoptysis; No shortness of breath  CARDIOVASCULAR: No chest pain or palpitations  GASTROINTESTINAL: No abdominal or epigastric pain. No nausea, vomiting, or hematemesis; No diarrhea or constipation. No melena or hematochezia.  GENITOURINARY: No dysuria, frequency or hematuria  NEUROLOGICAL: No numbness or weakness  SKIN: No itching, rashes      MEDICATIONS  (STANDING):  ALBUTerol/ipratropium for Nebulization 3 milliLiter(s) Nebulizer every 6 hours  aspirin  chewable 81 milliGRAM(s) Oral daily  calamine Lotion 1 Application(s) Topical daily  ceFAZolin   IVPB 2000 milliGRAM(s) IV Intermittent <User Schedule>  ceFAZolin   IVPB 3000 milliGRAM(s) IV Intermittent <User Schedule>  dextrose 5%. 1000 milliLiter(s) (50 mL/Hr) IV Continuous <Continuous>  dextrose 50% Injectable 12.5 Gram(s) IV Push once  dextrose 50% Injectable 25 Gram(s) IV Push once  epoetin odalis Injectable 70373 Unit(s) SubCutaneous <User Schedule>  heparin  Injectable 5000 Unit(s) SubCutaneous every 8 hours  insulin lispro (HumaLOG) corrective regimen sliding scale   SubCutaneous every 6 hours  insulin NPH human recombinant 6 Unit(s) SubCutaneous every 6 hours  labetalol 100 milliGRAM(s) Oral two times a day  nystatin Powder 1 Application(s) Topical two times a day  sodium chloride 3%  Inhalation 3 milliLiter(s) Inhalation four times a day    MEDICATIONS  (PRN):  acetaminophen    Suspension. 650 milliGRAM(s) Oral every 6 hours PRN Mild Pain (1 - 3)  chlorhexidine 0.12% Liquid 15 milliLiter(s) Swish and Spit every 4 hours PRN mouth hygiene  glucagon  Injectable 1 milliGRAM(s) IntraMuscular once PRN Glucose LESS THAN 70 milligrams/deciliter  hydrocortisone 1% Ointment 1 Application(s) Topical every 8 hours PRN Rash and/or Itching      Allergies    doxycycline (Rash)  isoniazid (Rash)  NIFEdipine (Urticaria; Hives)  vitamin E (Short breath; Urticaria; Hives)    Intolerances        Vital Signs Last 24 Hrs  T(C): 37.6 (20 Mar 2018 04:48), Max: 38.2 (20 Mar 2018 01:02)  T(F): 99.6 (20 Mar 2018 04:48), Max: 100.8 (20 Mar 2018 01:02)  HR: 98 (20 Mar 2018 04:48) (85 - 116)  BP: 134/77 (20 Mar 2018 04:48) (134/77 - 156/67)  BP(mean): --  RR: 18 (20 Mar 2018 04:48) (18 - 21)  SpO2: 99% (20 Mar 2018 04:48) (94% - 100%)    PHYSICAL EXAM:      Constitutional: NAD, sitting upright  Respiratory: CTA b/l, no rales, rhonchi, wheezes heard  Cardiovascular: S1, S2, RRR  Gastrointestinal: soft nontender, non distended, + BS                              7.7    5.0   )-----------( 356      ( 19 Mar 2018 07:38 )             24.6       03-19    135  |  94<L>  |  46<H>  ----------------------------<  280<H>  3.4<L>   |  28  |  3.32<H>    Ca    9.5      19 Mar 2018 07:38  Phos  4.9     03-19  Mg     2.4     03-19      A&P:  67F PMH HTN, DMT2, Breast Cancer (diagnosed in Feb 2017, b/l mastectomy, R LN resection 4/17' chemo) currently on trastuzumab (last dose Jan 20th), originally presented 1/30 with fever found to have influenza subsequently went into PEA arrest x 2 with ROSC, complicated by bilateral pneumothoraces with pneumomediastinum s/p 3 chest tubes, and also new ESRD requiring  HD, readmitted to the MICU for hypoxic respiratory failure likely 2/2 aspiration pneumonia. Pt  now had one episode of vomiting likely due to episode of vomiting witnessed by daughter    1. Post tussive vomiting   - continue vital signs  - continue with Abx  - will check residual prior to resuming tube feeding.   - will endorse to the day team.    Evelina SAUER   #[id number]

## 2018-03-20 NOTE — PROGRESS NOTE ADULT - PROBLEM SELECTOR PLAN 1
from ATN in setting of cardiac arrest. Pt continues to be oligo-anuric, requiring dialysis. No evidence of renal recovery at present.  Patient s/p tunneled HD catheter placement  by IR . Last HD was done on 3/19. Labs reviewed from today; no plan for HD today. Monitor BMP daily.

## 2018-03-20 NOTE — PROGRESS NOTE ADULT - ASSESSMENT
67 y/o F with T2DM uncontrolled with neuropathy and ESRD now on HD on insulin, osteoporosis, HTN, here with acute respiratory failure 2/2 influenza s/p CVA and PEA arrest x 2, and MSSA bacteremia on month long tx for aspiration pna, s/p PEG and trach placement. TF currently on hold due to emesis.

## 2018-03-20 NOTE — PROGRESS NOTE ADULT - PROBLEM SELECTOR PLAN 1
-test BG Q6h  -continue moderate scale q6  - TF on hold but patient has persistent hyperglycemia in the mid-high 200s despite getting NPH until 5pm yesterday in setting of ESRD. Give Lantus 20 units x 1 now and then re-assess tomorrow based on TF status.  -Patria Weiner MD  [phone]

## 2018-03-20 NOTE — PROGRESS NOTE ADULT - SUBJECTIVE AND OBJECTIVE BOX
[facility] Division of Kidney Diseases & Hypertension  FOLLOW UP NOTE  [phone]--------------------------------------------------------------------------------  Chief Complaint:Cardiac arrest      24 hour events/subjective:    Had HD yesterday and tolerated it well.   Had 1 episode of vomiting today.     PAST HISTORY  --------------------------------------------------------------------------------  No significant changes to PMH, PSH, FHx, SHx, unless otherwise noted    ALLERGIES & MEDICATIONS  --------------------------------------------------------------------------------  Allergies    doxycycline (Rash)  isoniazid (Rash)  NIFEdipine (Urticaria; Hives)  vitamin E (Short breath; Urticaria; Hives)    Intolerances      Standing Inpatient Medications  ALBUTerol/ipratropium for Nebulization 3 milliLiter(s) Nebulizer every 6 hours  aspirin  chewable 81 milliGRAM(s) Oral daily  calamine Lotion 1 Application(s) Topical daily  ceFAZolin   IVPB 2000 milliGRAM(s) IV Intermittent <User Schedule>  ceFAZolin   IVPB 3000 milliGRAM(s) IV Intermittent <User Schedule>  dextrose 5%. 1000 milliLiter(s) IV Continuous <Continuous>  dextrose 50% Injectable 12.5 Gram(s) IV Push once  dextrose 50% Injectable 25 Gram(s) IV Push once  epoetin odalis Injectable 33278 Unit(s) SubCutaneous <User Schedule>  heparin  Injectable 5000 Unit(s) SubCutaneous every 8 hours  insulin lispro (HumaLOG) corrective regimen sliding scale   SubCutaneous every 6 hours  insulin NPH human recombinant 6 Unit(s) SubCutaneous every 6 hours  labetalol 100 milliGRAM(s) Oral two times a day  nystatin Powder 1 Application(s) Topical two times a day  sodium chloride 3%  Inhalation 3 milliLiter(s) Inhalation four times a day    PRN Inpatient Medications  acetaminophen    Suspension. 650 milliGRAM(s) Oral every 6 hours PRN  chlorhexidine 0.12% Liquid 15 milliLiter(s) Swish and Spit every 4 hours PRN  glucagon  Injectable 1 milliGRAM(s) IntraMuscular once PRN  hydrocortisone 1% Ointment 1 Application(s) Topical every 8 hours PRN      REVIEW OF SYSTEMS  --------------------------------------------------------------------------------  unable to obtain.     VITALS/PHYSICAL EXAM  --------------------------------------------------------------------------------  T(C): 37.6 (03-20-18 @ 04:48), Max: 38.2 (03-20-18 @ 01:02)  HR: 90 (03-20-18 @ 09:27) (89 - 116)  BP: 132/70 (03-20-18 @ 08:25) (132/70 - 156/67)  RR: 21 (03-20-18 @ 09:27) (18 - 21)  SpO2: 100% (03-20-18 @ 09:27) (94% - 100%)  Wt(kg): --    Weight (kg): 70.4 (03-18-18 @ 11:29)      03-19-18 @ 07:01  -  03-20-18 @ 07:00  --------------------------------------------------------  IN: 770 mL / OUT: 500 mL / NET: 270 mL      Physical Exam:  	  Gen: Intubated, on mechanical ventilator   	HEENT: PERRL, supple neck, no jvp  	Pulm: CTA B/L  	CV: RRR, S1S2; no rub  	Back: No spinal or CVA tenderness; no sacral edema  	Abd: +BS, soft, nontender/nondistended  	: No suprapubic tenderness  	Ext: no edema  	Neuro: Sedated  	Skin: Warm, without rashes  	Vascular access: tunneled HD catheter present.       LABS/STUDIES  --------------------------------------------------------------------------------              7.9    7.5   >-----------<  343      [03-20-18 @ 07:15]              25.1     136  |  95  |  17  ----------------------------<  224      [03-20-18 @ 07:13]  3.9   |  30  |  1.72        Ca     9.2     [03-20-18 @ 07:13]      Mg     2.1     [03-20-18 @ 07:13]      Phos  2.6     [03-20-18 @ 07:13]            Creatinine Trend:  SCr 1.72 [03-20 @ 07:13]  SCr 3.32 [03-19 @ 07:38]  SCr 2.66 [03-18 @ 06:54]  SCr 1.76 [03-17 @ 06:16]  SCr 2.19 [03-15 @ 22:51]

## 2018-03-20 NOTE — PROGRESS NOTE ADULT - SUBJECTIVE AND OBJECTIVE BOX
Chief Complaint/Follow-up on: T2DM  Subjective: Patient sleeping. Per  she is up all night and sleeps all day. She opened her eyes and identified me by her first and last name. She had a couple of episodes of emesis so TF are on hold and patient is now on reglan for suspected gastroparesis.     MEDICATIONS  (STANDING):  ALBUTerol/ipratropium for Nebulization 3 milliLiter(s) Nebulizer every 6 hours  aspirin  chewable 81 milliGRAM(s) Oral daily  calamine Lotion 1 Application(s) Topical daily  ceFAZolin   IVPB 2000 milliGRAM(s) IV Intermittent <User Schedule>  ceFAZolin   IVPB 3000 milliGRAM(s) IV Intermittent <User Schedule>  dextrose 5%. 1000 milliLiter(s) (50 mL/Hr) IV Continuous <Continuous>  dextrose 50% Injectable 12.5 Gram(s) IV Push once  dextrose 50% Injectable 25 Gram(s) IV Push once  epoetin odalis Injectable 00343 Unit(s) SubCutaneous <User Schedule>  heparin  Injectable 5000 Unit(s) SubCutaneous every 8 hours  insulin glargine Injectable (LANTUS) 20 Unit(s) SubCutaneous at bedtime  insulin lispro (HumaLOG) corrective regimen sliding scale   SubCutaneous every 6 hours  labetalol 100 milliGRAM(s) Oral two times a day  metoclopramide 5 milliGRAM(s) Oral every 8 hours  nystatin Powder 1 Application(s) Topical two times a day  sodium chloride 3%  Inhalation 3 milliLiter(s) Inhalation four times a day    MEDICATIONS  (PRN):  acetaminophen    Suspension. 650 milliGRAM(s) Oral every 6 hours PRN Mild Pain (1 - 3)  chlorhexidine 0.12% Liquid 15 milliLiter(s) Swish and Spit every 4 hours PRN mouth hygiene  glucagon  Injectable 1 milliGRAM(s) IntraMuscular once PRN Glucose LESS THAN 70 milligrams/deciliter  hydrocortisone 1% Ointment 1 Application(s) Topical every 8 hours PRN Rash and/or Itching      PHYSICAL EXAM:  VITALS: T(C): 37.6 (03-20-18 @ 04:48)  T(F): 99.6 (03-20-18 @ 04:48), Max: 100.8 (03-20-18 @ 01:02)  HR: 98 (03-20-18 @ 12:44) (89 - 116)  BP: 132/70 (03-20-18 @ 08:25) (132/70 - 156/67)  RR:  (18 - 22)  SpO2:  (94% - 100%)  Wt(kg): --  GENERAL: NAD, well-groomed, well-developed  HEENT:  Atraumatic, Normocephalic, moist mucous membranes, +trach in place with NRB overlying it  RESPIRATORY: Clear to auscultation bilaterally; No rales, rhonchi, wheezing, or rubs  CARDIOVASCULAR: 100bpm  GI: Soft, nontender, non distended, normal bowel sounds, +PEG placed      POCT Blood Glucose.: 243 mg/dL (03-20-18 @ 12:30)  POCT Blood Glucose.: 212 mg/dL (03-20-18 @ 06:45)  POCT Blood Glucose.: 255 mg/dL (03-19-18 @ 23:51)  POCT Blood Glucose.: 245 mg/dL (03-19-18 @ 17:32)  POCT Blood Glucose.: 286 mg/dL (03-19-18 @ 11:43)  POCT Blood Glucose.: 268 mg/dL (03-19-18 @ 07:34)  POCT Blood Glucose.: 272 mg/dL (03-19-18 @ 06:04)  POCT Blood Glucose.: 250 mg/dL (03-18-18 @ 23:09)  POCT Blood Glucose.: 246 mg/dL (03-18-18 @ 17:35)  POCT Blood Glucose.: 225 mg/dL (03-18-18 @ 11:47)  POCT Blood Glucose.: 179 mg/dL (03-18-18 @ 06:43)  POCT Blood Glucose.: 231 mg/dL (03-17-18 @ 23:55)  POCT Blood Glucose.: 269 mg/dL (03-17-18 @ 17:30)    03-20    136  |  95<L>  |  17  ----------------------------<  224<H>  3.9   |  30  |  1.72<H>    EGFR if : 34<L>  EGFR if non : 30<L>    Ca    9.2      03-20  Mg     2.1     03-20  Phos  2.6     03-20            Hemoglobin A1C, Whole Blood: 8.6 % <H> [4.0 - 5.6] (01-31-18 @ 07:15)  Hemoglobin A1C, Whole Blood: 8.7 % <H> [4.0 - 5.6] (01-31-18 @ 05:51)

## 2018-03-20 NOTE — PROGRESS NOTE ADULT - SUBJECTIVE AND OBJECTIVE BOX
Patient is a 68y old  Female who presents with a chief complaint of Fever, total body weakness (02 Feb 2018 13:44)      Interval Events:    REVIEW OF SYSTEMS:  [ ] Positive  [ ] All other systems negative  [ ] Unable to assess ROS because ________    Vital Signs Last 24 Hrs  T(C): 37.6 (03-20-18 @ 04:48), Max: 38.2 (03-20-18 @ 01:02)  T(F): 99.6 (03-20-18 @ 04:48), Max: 100.8 (03-20-18 @ 01:02)  HR: 89 (03-20-18 @ 05:36) (85 - 116)  BP: 134/77 (03-20-18 @ 04:48) (134/77 - 156/67)  RR: 18 (03-20-18 @ 04:48) (18 - 21)  SpO2: 100% (03-20-18 @ 05:36) (94% - 100%)    PHYSICAL EXAM:  HEENT:   [ ]Tracheostomy:  [ ]Pupils equal  [ ]No oral lesions  [ ]Abnormal    SKIN  [ ]No Rash  [ ] Abnormal  [ ] pressure    CARDIAC  [ ]Regular  [ ]Abnormal    PULMONARY  [ ]Bilateral Clear Breath Sounds  [ ]Normal Excursion  [ ]Abnormal    GI  [ ]PEG      [ ] +BS		              [ ]Soft, nondistended, nontender	  [ ]Abnormal    MUSCULOSKELETAL                                   [ ]Bedbound                 [ ]Abnormal    [ ]Ambulatory/OOB to chair                           EXTREMITIES                                         [ ]Normal  [ ]Edema                           NEUROLOGIC  [ ] Normal, non focal  [ ] Focal findings:    PSYCHIATRIC  [ ]Alert and appropriate  [ ] Sedated	 [ ]Agitated    :  Wheeler: [ ] Yes, if yes: Date of Placement:                   [  ] No    LINES: Central Lines [ ] Yes, if yes: Date of Placement                                     [  ] No    HOSPITAL MEDICATIONS:  MEDICATIONS  (STANDING):  ALBUTerol/ipratropium for Nebulization 3 milliLiter(s) Nebulizer every 6 hours  aspirin  chewable 81 milliGRAM(s) Oral daily  calamine Lotion 1 Application(s) Topical daily  ceFAZolin   IVPB 2000 milliGRAM(s) IV Intermittent <User Schedule>  ceFAZolin   IVPB 3000 milliGRAM(s) IV Intermittent <User Schedule>  dextrose 5%. 1000 milliLiter(s) (50 mL/Hr) IV Continuous <Continuous>  dextrose 50% Injectable 12.5 Gram(s) IV Push once  dextrose 50% Injectable 25 Gram(s) IV Push once  epoetin odalis Injectable 78316 Unit(s) SubCutaneous <User Schedule>  heparin  Injectable 5000 Unit(s) SubCutaneous every 8 hours  insulin lispro (HumaLOG) corrective regimen sliding scale   SubCutaneous every 6 hours  insulin NPH human recombinant 6 Unit(s) SubCutaneous every 6 hours  labetalol 100 milliGRAM(s) Oral two times a day  nystatin Powder 1 Application(s) Topical two times a day  sodium chloride 3%  Inhalation 3 milliLiter(s) Inhalation four times a day    MEDICATIONS  (PRN):  acetaminophen    Suspension. 650 milliGRAM(s) Oral every 6 hours PRN Mild Pain (1 - 3)  chlorhexidine 0.12% Liquid 15 milliLiter(s) Swish and Spit every 4 hours PRN mouth hygiene  glucagon  Injectable 1 milliGRAM(s) IntraMuscular once PRN Glucose LESS THAN 70 milligrams/deciliter  hydrocortisone 1% Ointment 1 Application(s) Topical every 8 hours PRN Rash and/or Itching      LABS:                        7.9    7.5   )-----------( 343      ( 20 Mar 2018 07:15 )             25.1     03-20    136  |  95<L>  |  17  ----------------------------<  224<H>  3.9   |  30  |  1.72<H>    Ca    9.2      20 Mar 2018 07:13  Phos  2.6     03-20  Mg     2.1     03-20          Arterial Blood Gas:  03-19 @ 12:06  7.40/47/95/28/98/3.3  ABG lactate: --      CAPILLARY BLOOD GLUCOSE    MICROBIOLOGY:     RADIOLOGY:  [ ] Reviewed and interpreted by me    Mode: OFF  FiO2: 30 Patient is a 68y old  Female who presents with a chief complaint of Fever, total body weakness (02 Feb 2018 13:44)      Interval Events: cough with "white vomit"     REVIEW OF SYSTEMS:  [ ] Positive  [ ] All other systems negative  [ ] Unable to assess ROS because ________    Vital Signs Last 24 Hrs  T(C): 37.6 (03-20-18 @ 04:48), Max: 38.2 (03-20-18 @ 01:02)  T(F): 99.6 (03-20-18 @ 04:48), Max: 100.8 (03-20-18 @ 01:02)  HR: 89 (03-20-18 @ 05:36) (85 - 116)  BP: 134/77 (03-20-18 @ 04:48) (134/77 - 156/67)  RR: 18 (03-20-18 @ 04:48) (18 - 21)  SpO2: 100% (03-20-18 @ 05:36) (94% - 100%)    PHYSICAL EXAM:  HEENT:   [ ]Tracheostomy:  [ ]Pupils equal  [ ]No oral lesions  [ ]Abnormal    SKIN  [ ]No Rash  [ ] Abnormal  [ ] pressure    CARDIAC  [ ]Regular  [ ]Abnormal    PULMONARY  [ ]Bilateral Clear Breath Sounds  [ ]Normal Excursion  [ ]Abnormal    GI  [ ]PEG      [ ] +BS		              [ ]Soft, nondistended, nontender	  [ ]Abnormal    MUSCULOSKELETAL                                   [ ]Bedbound                 [ ]Abnormal    [ ]Ambulatory/OOB to chair                           EXTREMITIES                                         [ ]Normal  [ ]Edema                           NEUROLOGIC  [ ] Normal, non focal  [ ] Focal findings:    PSYCHIATRIC  [ ]Alert and appropriate  [ ] Sedated	 [ ]Agitated    :  Summer: [ ] Yes, if yes: Date of Placement:                   [  ] No    LINES: Central Lines [ ] Yes, if yes: Date of Placement                                     [  ] No    HOSPITAL MEDICATIONS:  MEDICATIONS  (STANDING):  ALBUTerol/ipratropium for Nebulization 3 milliLiter(s) Nebulizer every 6 hours  aspirin  chewable 81 milliGRAM(s) Oral daily  calamine Lotion 1 Application(s) Topical daily  ceFAZolin   IVPB 2000 milliGRAM(s) IV Intermittent <User Schedule>  ceFAZolin   IVPB 3000 milliGRAM(s) IV Intermittent <User Schedule>  dextrose 5%. 1000 milliLiter(s) (50 mL/Hr) IV Continuous <Continuous>  dextrose 50% Injectable 12.5 Gram(s) IV Push once  dextrose 50% Injectable 25 Gram(s) IV Push once  epoetin odalis Injectable 99209 Unit(s) SubCutaneous <User Schedule>  heparin  Injectable 5000 Unit(s) SubCutaneous every 8 hours  insulin lispro (HumaLOG) corrective regimen sliding scale   SubCutaneous every 6 hours  insulin NPH human recombinant 6 Unit(s) SubCutaneous every 6 hours  labetalol 100 milliGRAM(s) Oral two times a day  nystatin Powder 1 Application(s) Topical two times a day  sodium chloride 3%  Inhalation 3 milliLiter(s) Inhalation four times a day    MEDICATIONS  (PRN):  acetaminophen    Suspension. 650 milliGRAM(s) Oral every 6 hours PRN Mild Pain (1 - 3)  chlorhexidine 0.12% Liquid 15 milliLiter(s) Swish and Spit every 4 hours PRN mouth hygiene  glucagon  Injectable 1 milliGRAM(s) IntraMuscular once PRN Glucose LESS THAN 70 milligrams/deciliter  hydrocortisone 1% Ointment 1 Application(s) Topical every 8 hours PRN Rash and/or Itching      LABS:                        7.9    7.5   )-----------( 343      ( 20 Mar 2018 07:15 )             25.1     03-20    136  |  95<L>  |  17  ----------------------------<  224<H>  3.9   |  30  |  1.72<H>    Ca    9.2      20 Mar 2018 07:13  Phos  2.6     03-20  Mg     2.1     03-20          Arterial Blood Gas:  03-19 @ 12:06  7.40/47/95/28/98/3.3  ABG lactate: --      CAPILLARY BLOOD GLUCOSE    MICROBIOLOGY:     RADIOLOGY:  [ ] Reviewed and interpreted by me    Mode: OFF  FiO2: 30 Patient is a 68y old  Female who presents with a chief complaint of Fever, total body weakness (02 Feb 2018 13:44)      Interval Events: cough with "white vomit"     REVIEW OF SYSTEMS:  [ ] Positive  [x ] All other systems negative  [ ] Unable to assess ROS because ________    Vital Signs Last 24 Hrs  T(C): 37.6 (03-20-18 @ 04:48), Max: 38.2 (03-20-18 @ 01:02)  T(F): 99.6 (03-20-18 @ 04:48), Max: 100.8 (03-20-18 @ 01:02)  HR: 89 (03-20-18 @ 05:36) (85 - 116)  BP: 134/77 (03-20-18 @ 04:48) (134/77 - 156/67)  RR: 18 (03-20-18 @ 04:48) (18 - 21)  SpO2: 100% (03-20-18 @ 05:36) (94% - 100%)    PHYSICAL EXAM:  HEENT:   [ x]Tracheostomy:  [x ]Pupils equal  [ ]No oral lesions  [ ]Abnormal    SKIN  [x ]No Rash  [ ] Abnormal  [ ] pressure    CARDIAC  [x ]Regular  [ ]Abnormal    PULMONARY  [x ]Bilateral Clear Breath Sounds  [ ]Normal Excursion  [ ]Abnormal    GI  [x ]PEG      [x ] +BS		              [x ]Soft, nondistended, nontender	  [ ]Abnormal    MUSCULOSKELETAL                                   [x ]Bedbound                 [ ]Abnormal    [ ]Ambulatory/OOB to chair                           EXTREMITIES                                         [x ]Normal  [ ]Edema                           NEUROLOGIC  [ ] Normal, non focal  [ ] Focal findings:    PSYCHIATRIC  [ ]Alert and appropriate  [ ] Sedated	 [ ]Agitated    :  Wheeler: [ ] Yes, if yes: Date of Placement:                   [ x ] No    LINES: Central Lines x[ ] Yes, if yes: Date of Placement  R [facility]acath  [facility]                                     [  ] No    HOSPITAL MEDICATIONS:  MEDICATIONS  (STANDING):  ALBUTerol/ipratropium for Nebulization 3 milliLiter(s) Nebulizer every 6 hours  aspirin  chewable 81 milliGRAM(s) Oral daily  calamine Lotion 1 Application(s) Topical daily  ceFAZolin   IVPB 2000 milliGRAM(s) IV Intermittent <User Schedule>  ceFAZolin   IVPB 3000 milliGRAM(s) IV Intermittent <User Schedule>  dextrose 5%. 1000 milliLiter(s) (50 mL/Hr) IV Continuous <Continuous>  dextrose 50% Injectable 12.5 Gram(s) IV Push once  dextrose 50% Injectable 25 Gram(s) IV Push once  epoetin odalis Injectable 77819 Unit(s) SubCutaneous <User Schedule>  heparin  Injectable 5000 Unit(s) SubCutaneous every 8 hours  insulin lispro (HumaLOG) corrective regimen sliding scale   SubCutaneous every 6 hours  insulin NPH human recombinant 6 Unit(s) SubCutaneous every 6 hours  labetalol 100 milliGRAM(s) Oral two times a day  nystatin Powder 1 Application(s) Topical two times a day  sodium chloride 3%  Inhalation 3 milliLiter(s) Inhalation four times a day    MEDICATIONS  (PRN):  acetaminophen    Suspension. 650 milliGRAM(s) Oral every 6 hours PRN Mild Pain (1 - 3)  chlorhexidine 0.12% Liquid 15 milliLiter(s) Swish and Spit every 4 hours PRN mouth hygiene  glucagon  Injectable 1 milliGRAM(s) IntraMuscular once PRN Glucose LESS THAN 70 milligrams/deciliter  hydrocortisone 1% Ointment 1 Application(s) Topical every 8 hours PRN Rash and/or Itching      LABS:                        7.9    7.5   )-----------( 343      ( 20 Mar 2018 07:15 )             25.1     03-20    136  |  95<L>  |  17  ----------------------------<  224<H>  3.9   |  30  |  1.72<H>    Ca    9.2      20 Mar 2018 07:13  Phos  2.6     03-20  Mg     2.1     03-20          Arterial Blood Gas:  03-19 @ 12:06  7.40/47/95/28/98/3.3  ABG lactate: --      CAPILLARY BLOOD GLUCOSE    MICROBIOLOGY:     RADIOLOGY:  [ ] Reviewed and interpreted by me    Mode: OFF  FiO2: 30

## 2018-03-20 NOTE — PROVIDER CONTACT NOTE (OTHER) - SITUATION
Pt with one episode of continuous vomiting, clear and mucus like.. Pt with continuous productive cough. Suctioning done

## 2018-03-21 LAB
ANION GAP SERPL CALC-SCNC: 12 MMOL/L — SIGNIFICANT CHANGE UP (ref 5–17)
BUN SERPL-MCNC: 30 MG/DL — HIGH (ref 7–23)
CALCIUM SERPL-MCNC: 9.1 MG/DL — SIGNIFICANT CHANGE UP (ref 8.4–10.5)
CHLORIDE SERPL-SCNC: 95 MMOL/L — LOW (ref 96–108)
CO2 SERPL-SCNC: 29 MMOL/L — SIGNIFICANT CHANGE UP (ref 22–31)
CREAT SERPL-MCNC: 2.54 MG/DL — HIGH (ref 0.5–1.3)
GLUCOSE BLDC GLUCOMTR-MCNC: 140 MG/DL — HIGH (ref 70–99)
GLUCOSE BLDC GLUCOMTR-MCNC: 178 MG/DL — HIGH (ref 70–99)
GLUCOSE BLDC GLUCOMTR-MCNC: 202 MG/DL — HIGH (ref 70–99)
GLUCOSE BLDC GLUCOMTR-MCNC: 215 MG/DL — HIGH (ref 70–99)
GLUCOSE BLDC GLUCOMTR-MCNC: 242 MG/DL — HIGH (ref 70–99)
GLUCOSE SERPL-MCNC: 201 MG/DL — HIGH (ref 70–99)
HCT VFR BLD CALC: 25.3 % — LOW (ref 34.5–45)
HGB BLD-MCNC: 7.8 G/DL — LOW (ref 11.5–15.5)
MAGNESIUM SERPL-MCNC: 2.2 MG/DL — SIGNIFICANT CHANGE UP (ref 1.6–2.6)
MCHC RBC-ENTMCNC: 29.6 PG — SIGNIFICANT CHANGE UP (ref 27–34)
MCHC RBC-ENTMCNC: 30.9 GM/DL — LOW (ref 32–36)
MCV RBC AUTO: 95.8 FL — SIGNIFICANT CHANGE UP (ref 80–100)
PHOSPHATE SERPL-MCNC: 3.2 MG/DL — SIGNIFICANT CHANGE UP (ref 2.5–4.5)
PLATELET # BLD AUTO: 347 K/UL — SIGNIFICANT CHANGE UP (ref 150–400)
POTASSIUM SERPL-MCNC: 3.7 MMOL/L — SIGNIFICANT CHANGE UP (ref 3.5–5.3)
POTASSIUM SERPL-SCNC: 3.7 MMOL/L — SIGNIFICANT CHANGE UP (ref 3.5–5.3)
RBC # BLD: 2.64 M/UL — LOW (ref 3.8–5.2)
RBC # FLD: 15.9 % — HIGH (ref 10.3–14.5)
SODIUM SERPL-SCNC: 136 MMOL/L — SIGNIFICANT CHANGE UP (ref 135–145)
WBC # BLD: 5.1 K/UL — SIGNIFICANT CHANGE UP (ref 3.8–10.5)
WBC # FLD AUTO: 5.1 K/UL — SIGNIFICANT CHANGE UP (ref 3.8–10.5)

## 2018-03-21 PROCEDURE — 99291 CRITICAL CARE FIRST HOUR: CPT

## 2018-03-21 PROCEDURE — 99232 SBSQ HOSP IP/OBS MODERATE 35: CPT

## 2018-03-21 PROCEDURE — 99233 SBSQ HOSP IP/OBS HIGH 50: CPT | Mod: GC

## 2018-03-21 RX ADMIN — HEPARIN SODIUM 5000 UNIT(S): 5000 INJECTION INTRAVENOUS; SUBCUTANEOUS at 13:41

## 2018-03-21 RX ADMIN — Medication 5 MILLIGRAM(S): at 07:01

## 2018-03-21 RX ADMIN — Medication 5 MILLIGRAM(S): at 13:41

## 2018-03-21 RX ADMIN — SODIUM CHLORIDE 3 MILLILITER(S): 9 INJECTION INTRAMUSCULAR; INTRAVENOUS; SUBCUTANEOUS at 05:05

## 2018-03-21 RX ADMIN — NYSTATIN CREAM 1 APPLICATION(S): 100000 CREAM TOPICAL at 18:19

## 2018-03-21 RX ADMIN — Medication 3 MILLILITER(S): at 17:41

## 2018-03-21 RX ADMIN — Medication 100 MILLIGRAM(S): at 14:25

## 2018-03-21 RX ADMIN — CALAMINE 8% AND ZINC OXIDE 8% 1 APPLICATION(S): 160 LOTION TOPICAL at 13:42

## 2018-03-21 RX ADMIN — HUMAN INSULIN 8 UNIT(S): 100 INJECTION, SUSPENSION SUBCUTANEOUS at 18:13

## 2018-03-21 RX ADMIN — SODIUM CHLORIDE 3 MILLILITER(S): 9 INJECTION INTRAMUSCULAR; INTRAVENOUS; SUBCUTANEOUS at 17:42

## 2018-03-21 RX ADMIN — Medication 200 MILLIGRAM(S): at 18:18

## 2018-03-21 RX ADMIN — NYSTATIN CREAM 1 APPLICATION(S): 100000 CREAM TOPICAL at 07:01

## 2018-03-21 RX ADMIN — HEPARIN SODIUM 5000 UNIT(S): 5000 INJECTION INTRAVENOUS; SUBCUTANEOUS at 07:00

## 2018-03-21 RX ADMIN — Medication 4: at 18:12

## 2018-03-21 RX ADMIN — Medication 3 MILLILITER(S): at 23:43

## 2018-03-21 RX ADMIN — HUMAN INSULIN 8 UNIT(S): 100 INJECTION, SUSPENSION SUBCUTANEOUS at 07:00

## 2018-03-21 RX ADMIN — Medication 6: at 00:32

## 2018-03-21 RX ADMIN — SODIUM CHLORIDE 3 MILLILITER(S): 9 INJECTION INTRAMUSCULAR; INTRAVENOUS; SUBCUTANEOUS at 23:45

## 2018-03-21 RX ADMIN — Medication 4: at 06:59

## 2018-03-21 RX ADMIN — Medication 81 MILLIGRAM(S): at 13:41

## 2018-03-21 RX ADMIN — Medication 3 MILLILITER(S): at 13:23

## 2018-03-21 RX ADMIN — Medication 5 MILLIGRAM(S): at 21:39

## 2018-03-21 RX ADMIN — HEPARIN SODIUM 5000 UNIT(S): 5000 INJECTION INTRAVENOUS; SUBCUTANEOUS at 21:39

## 2018-03-21 RX ADMIN — SODIUM CHLORIDE 3 MILLILITER(S): 9 INJECTION INTRAMUSCULAR; INTRAVENOUS; SUBCUTANEOUS at 13:24

## 2018-03-21 RX ADMIN — ERYTHROPOIETIN 10000 UNIT(S): 10000 INJECTION, SOLUTION INTRAVENOUS; SUBCUTANEOUS at 10:07

## 2018-03-21 RX ADMIN — Medication 3 MILLILITER(S): at 05:05

## 2018-03-21 NOTE — PROGRESS NOTE ADULT - PROBLEM SELECTOR PLAN 1
-test BG Q6h  -c/w NPH 8 units BID (6am/6pm-hold if tube feeds off)  -c/w Humalog moderate correction scale Q6h  -discussed w/pt's family and RCU team  pager: [phone]

## 2018-03-21 NOTE — PROGRESS NOTE ADULT - PROBLEM SELECTOR PLAN 1
from ATN in setting of cardiac arrest. Pt continues to be oligo-anuric, requiring dialysis. No evidence of renal recovery at present.  Patient s/p tunneled HD catheter placement  by IR . Last HD was done on 3/19. Labs reviewed from today; will plan for HD today. Monitor BMP daily.

## 2018-03-21 NOTE — PROGRESS NOTE ADULT - SUBJECTIVE AND OBJECTIVE BOX
Diabetes Follow up note:  Interval Hx:   67 y/o F with T2DM uncontrolled with neuropathy and ESRD now on HD on insulin, osteoporosis, HTN, here with acute respiratory failure 2/2 influenza s/p CVA and PEA arrest x 2, and MSSA bacteremia on month long tx for aspiration pna, re-intubated on 3.11.18 for hypoxia/respiratory distress s/p Trach and PEG. Pt now on tube feeds 18h/day. Vomiting has stopped now on Reglan. Pt seen at bedside w/daughter present. BG values improving today after being elevated persistently in 200's for days. Tolerating TF @ goal 40 cc/hr.     Review of Systems:  Follows commands. Awake  Denies pain  Denies n/v/abd pain    MEDS:    insulin lispro (HumaLOG) corrective regimen sliding scale   SubCutaneous every 6 hours  insulin NPH human recombinant 8 Unit(s) SubCutaneous <User Schedule>    ceFAZolin   IVPB 2000 milliGRAM(s) IV Intermittent <User Schedule>  ceFAZolin   IVPB 3000 milliGRAM(s) IV Intermittent <User Schedule>    Allergies    doxycycline (Rash)  isoniazid (Rash)  NIFEdipine (Urticaria; Hives)  vitamin E (Short breath; Urticaria; Hives)        PE:  General: Female lying in bed. NAD.   Vital Signs Last 24 Hrs  T(C): 37.1 (21 Mar 2018 12:55), Max: 37.2 (20 Mar 2018 21:05)  T(F): 98.7 (21 Mar 2018 12:55), Max: 99 (21 Mar 2018 09:50)  HR: 100 (21 Mar 2018 12:55) (86 - 100)  BP: 141/69 (21 Mar 2018 12:55) (126/72 - 141/69)  BP(mean): --  RR: 18 (21 Mar 2018 12:55) (18 - 20)  SpO2: 100% (21 Mar 2018 12:55) (97% - 100%)  HEENT: Trach in place. On trach collar  Abd: Soft, NT,ND, PEG in place.   Extremities: Warm. No edema  Neuro: Awake. Nods yes/no to questions appropriately.     LABS:    POCT Blood Glucose.: 140 mg/dL (03-21-18 @ 12:10)  POCT Blood Glucose.: 202 mg/dL (03-21-18 @ 06:57)  POCT Blood Glucose.: 178 mg/dL (03-21-18 @ 05:47)  POCT Blood Glucose.: 292 mg/dL (03-20-18 @ 23:50)  POCT Blood Glucose.: 217 mg/dL (03-20-18 @ 18:05)  POCT Blood Glucose.: 243 mg/dL (03-20-18 @ 12:30)  POCT Blood Glucose.: 212 mg/dL (03-20-18 @ 06:45)  POCT Blood Glucose.: 255 mg/dL (03-19-18 @ 23:51)  POCT Blood Glucose.: 245 mg/dL (03-19-18 @ 17:32)  POCT Blood Glucose.: 286 mg/dL (03-19-18 @ 11:43)  POCT Blood Glucose.: 268 mg/dL (03-19-18 @ 07:34)  POCT Blood Glucose.: 272 mg/dL (03-19-18 @ 06:04)  POCT Blood Glucose.: 250 mg/dL (03-18-18 @ 23:09)  POCT Blood Glucose.: 246 mg/dL (03-18-18 @ 17:35)                            7.8    5.1   )-----------( 347      ( 21 Mar 2018 07:09 )             25.3       03-21    136  |  95<L>  |  30<H>  ----------------------------<  201<H>  3.7   |  29  |  2.54<H>    Ca    9.1      21 Mar 2018 07:09  Phos  3.2     03-21  Mg     2.2     03-21          Hemoglobin A1C, Whole Blood: 8.6 % <H> [4.0 - 5.6] (01-31-18 @ 07:15)  Hemoglobin A1C, Whole Blood: 8.7 % <H> [4.0 - 5.6] (01-31-18 @ 05:51)            Contact number: marcelino [phone] or [phone]

## 2018-03-21 NOTE — PROGRESS NOTE ADULT - ATTENDING COMMENTS
t is a 68 yo F with h/o HTN, DM and breast Ca originally presented 1/30 with fever found to have influenza and subsequently went into PEA arrest x 2 with ROSC. Code complicated by bilateral pneumothoraces s/p 3 chest tubes and paracentesis decompression of the abdomen and then pt found to have PE s/p tPA . In the MICU pt had RUSS requiring HD and Shock liver.  Pt had a MRI of head which showed watershed infarct and acute L occipital infarct. Pt was extubated on 2/8/17 and eventually pt transferred to [facility]. On 2/21 RRT called for worsening tachypnea, hypoxia and CXR: worsening pulm edema. ABG showed hypercapnia. Pt transferred/intubated to the ICU and found to have MSSA bacteremia and MSSA in the sputum. Pt extubated on 3/1. On 3/12 pt s/p hypoxic resp. failure/ s/p intubation. s/p trach and peg 3/14-15 respectively and then transferred back to RCU 3/16. No vomiting today    Resp: Cont TC/ PMV as per Speech/Swallow assessment  ID: Finish course of Ancef (3/22)  CVS: Cont Labetalol  FEN/GI: Cont Reglan and enteral feeds restarted  Endo: NPH and Lispro as per Endo  Renal: HD as per Renal  PT/OT  Social: Pt's daughter at the bedside and updated    CCT: 35 min

## 2018-03-21 NOTE — PROGRESS NOTE ADULT - PROBLEM SELECTOR PLAN 8
will change feed rate per nutritionist   Frequent BMs, will dc laxatives and Reglan dose reduced-  stool output greatly reduced feed rate per nutritionist 40 cc   Frequent BMs, will dc laxatives and Reglan dose reduced-  stool output greatly reduced

## 2018-03-21 NOTE — PROGRESS NOTE ADULT - ATTENDING COMMENTS
68 yoF with breast cancer, HTN who p/w flu, went into respiratory arrest, then PEA arrest, s/p chest tube, TPA and intubation. Course c/b shock liver, RUSS, pneumoperitoneum, and elevated cardiac enzymes. Chest tubes and peritoneal tubes removed, and shock liver now resolved. Now with Staph aureus bacteremia. Pending possible PEG/trach. Pt continues to be anuric requiring HD with fluid removal.    RUSS: Likely ischemic ATN in the setting of shock/sepsis  Currently with no e/o renal recovery    Stable labs  HD today  Would straight cath later today to ensure she is not retaining urine   Access: PC present    Anemia: DAVID with HD  Iron deficient : however holding iron in the setting of infection    Vol/HTN: BP stable .

## 2018-03-21 NOTE — PROGRESS NOTE ADULT - PROBLEM SELECTOR PLAN 5
2/2 asp PNA which has Resolved  Without excessive secretions  Trach on 3/14 TC ATC since 3/18. Will obtain ABG 2/2 asp PNA which has Resolved  Without excessive secretions  Trach on 3/14. TC ATC since 3/18. ronald  30%

## 2018-03-21 NOTE — PROGRESS NOTE ADULT - PROBLEM SELECTOR PLAN 3
Followed by Endo  Blood glucose stable Followed by Endo  Blood glucose stable  3/20  NPH  6am +  6p  -202 per Dr Weiner  -

## 2018-03-21 NOTE — PROGRESS NOTE ADULT - SUBJECTIVE AND OBJECTIVE BOX
[facility] Division of Kidney Diseases & Hypertension  FOLLOW UP NOTE  [phone]--------------------------------------------------------------------------------    24 hour events/subjective:    No acute events noted.  Last HD was on 3/19    PAST HISTORY  --------------------------------------------------------------------------------  No significant changes to PMH, PSH, FHx, SHx, unless otherwise noted    ALLERGIES & MEDICATIONS  --------------------------------------------------------------------------------  Allergies    doxycycline (Rash)  isoniazid (Rash)  NIFEdipine (Urticaria; Hives)  vitamin E (Short breath; Urticaria; Hives)    Intolerances      Standing Inpatient Medications  ALBUTerol/ipratropium for Nebulization 3 milliLiter(s) Nebulizer every 6 hours  aspirin  chewable 81 milliGRAM(s) Oral daily  calamine Lotion 1 Application(s) Topical daily  ceFAZolin   IVPB 2000 milliGRAM(s) IV Intermittent <User Schedule>  ceFAZolin   IVPB 3000 milliGRAM(s) IV Intermittent <User Schedule>  dextrose 5%. 1000 milliLiter(s) IV Continuous <Continuous>  dextrose 50% Injectable 12.5 Gram(s) IV Push once  dextrose 50% Injectable 25 Gram(s) IV Push once  epoetin odalis Injectable 30316 Unit(s) SubCutaneous <User Schedule>  heparin  Injectable 5000 Unit(s) SubCutaneous every 8 hours  insulin lispro (HumaLOG) corrective regimen sliding scale   SubCutaneous every 6 hours  insulin NPH human recombinant 8 Unit(s) SubCutaneous <User Schedule>  labetalol 100 milliGRAM(s) Oral two times a day  metoclopramide 5 milliGRAM(s) Oral every 8 hours  nystatin Powder 1 Application(s) Topical two times a day  sodium chloride 3%  Inhalation 3 milliLiter(s) Inhalation four times a day    PRN Inpatient Medications  acetaminophen    Suspension. 650 milliGRAM(s) Oral every 6 hours PRN  chlorhexidine 0.12% Liquid 15 milliLiter(s) Swish and Spit every 4 hours PRN  glucagon  Injectable 1 milliGRAM(s) IntraMuscular once PRN  hydrocortisone 1% Ointment 1 Application(s) Topical every 8 hours PRN      REVIEW OF SYSTEMS  --------------------------------------------------------------------------------  Unable to obtain.    VITALS/PHYSICAL EXAM  --------------------------------------------------------------------------------  T(C): 37.1 (03-21-18 @ 05:49), Max: 37.2 (03-20-18 @ 13:49)  HR: 98 (03-21-18 @ 05:49) (86 - 101)  BP: 138/68 (03-21-18 @ 05:49) (126/72 - 144/75)  RR: 20 (03-21-18 @ 05:49) (18 - 22)  SpO2: 99% (03-21-18 @ 05:49) (97% - 100%)  Wt(kg): --        03-20-18 @ 07:01  -  03-21-18 @ 07:00  --------------------------------------------------------  IN: 505 mL / OUT: 0 mL / NET: 505 mL      Physical Exam:  	              Gen: on trach collar.   	HEENT: PERRL, supple neck, no jvp  	Pulm: CTA B/L  	CV: RRR, S1S2; no rub  	Back: No spinal or CVA tenderness; no sacral edema  	Abd: +BS, soft, nontender/nondistended  	Ext: no edema  	Neuro: Sedated  	Skin: Warm, without rashes  	Vascular access: left tunneled HD catheter present.       LABS/STUDIES  --------------------------------------------------------------------------------              7.8    5.1   >-----------<  347      [03-21-18 @ 07:09]              25.3     136  |  95  |  30  ----------------------------<  201      [03-21-18 @ 07:09]  3.7   |  29  |  2.54        Ca     9.1     [03-21-18 @ 07:09]      Mg     2.2     [03-21-18 @ 07:09]      Phos  3.2     [03-21-18 @ 07:09]            Creatinine Trend:  SCr 2.54 [03-21 @ 07:09]  SCr 1.72 [03-20 @ 07:13]  SCr 3.32 [03-19 @ 07:38]  SCr 2.66 [03-18 @ 06:54]  SCr 1.76 [03-17 @ 06:16] [facility] Division of Kidney Diseases & Hypertension  FOLLOW UP NOTE  [phone]--------------------------------------------------------------------------------    CC: RUSS on HD    24 hour events/subjective:    No acute events noted.  Last HD was on 3/19    PAST HISTORY  --------------------------------------------------------------------------------  No significant changes to PMH, PSH, FHx, SHx, unless otherwise noted    ALLERGIES & MEDICATIONS  --------------------------------------------------------------------------------  Allergies    doxycycline (Rash)  isoniazid (Rash)  NIFEdipine (Urticaria; Hives)  vitamin E (Short breath; Urticaria; Hives)    Intolerances      Standing Inpatient Medications  ALBUTerol/ipratropium for Nebulization 3 milliLiter(s) Nebulizer every 6 hours  aspirin  chewable 81 milliGRAM(s) Oral daily  calamine Lotion 1 Application(s) Topical daily  ceFAZolin   IVPB 2000 milliGRAM(s) IV Intermittent <User Schedule>  ceFAZolin   IVPB 3000 milliGRAM(s) IV Intermittent <User Schedule>  dextrose 5%. 1000 milliLiter(s) IV Continuous <Continuous>  dextrose 50% Injectable 12.5 Gram(s) IV Push once  dextrose 50% Injectable 25 Gram(s) IV Push once  epoetin odalis Injectable 76950 Unit(s) SubCutaneous <User Schedule>  heparin  Injectable 5000 Unit(s) SubCutaneous every 8 hours  insulin lispro (HumaLOG) corrective regimen sliding scale   SubCutaneous every 6 hours  insulin NPH human recombinant 8 Unit(s) SubCutaneous <User Schedule>  labetalol 100 milliGRAM(s) Oral two times a day  metoclopramide 5 milliGRAM(s) Oral every 8 hours  nystatin Powder 1 Application(s) Topical two times a day  sodium chloride 3%  Inhalation 3 milliLiter(s) Inhalation four times a day    PRN Inpatient Medications  acetaminophen    Suspension. 650 milliGRAM(s) Oral every 6 hours PRN  chlorhexidine 0.12% Liquid 15 milliLiter(s) Swish and Spit every 4 hours PRN  glucagon  Injectable 1 milliGRAM(s) IntraMuscular once PRN  hydrocortisone 1% Ointment 1 Application(s) Topical every 8 hours PRN      REVIEW OF SYSTEMS  --------------------------------------------------------------------------------  Unable to obtain.    VITALS/PHYSICAL EXAM  --------------------------------------------------------------------------------  T(C): 37.1 (03-21-18 @ 05:49), Max: 37.2 (03-20-18 @ 13:49)  HR: 98 (03-21-18 @ 05:49) (86 - 101)  BP: 138/68 (03-21-18 @ 05:49) (126/72 - 144/75)  RR: 20 (03-21-18 @ 05:49) (18 - 22)  SpO2: 99% (03-21-18 @ 05:49) (97% - 100%)  Wt(kg): --        03-20-18 @ 07:01  -  03-21-18 @ 07:00  --------------------------------------------------------  IN: 505 mL / OUT: 0 mL / NET: 505 mL      Physical Exam:  	              Gen: on trach collar.   	HEENT: PERRL, supple neck, no jvp  	Pulm: CTA B/L  	CV: RRR, S1S2; no rub  	Back: No spinal or CVA tenderness; no sacral edema  	Abd: +BS, soft, nontender/nondistended  	Ext: no edema  	Neuro: Sedated  	Skin: Warm, without rashes  	Vascular access: left tunneled HD catheter present.       LABS/STUDIES  --------------------------------------------------------------------------------              7.8    5.1   >-----------<  347      [03-21-18 @ 07:09]              25.3     136  |  95  |  30  ----------------------------<  201      [03-21-18 @ 07:09]  3.7   |  29  |  2.54        Ca     9.1     [03-21-18 @ 07:09]      Mg     2.2     [03-21-18 @ 07:09]      Phos  3.2     [03-21-18 @ 07:09]            Creatinine Trend:  SCr 2.54 [03-21 @ 07:09]  SCr 1.72 [03-20 @ 07:13]  SCr 3.32 [03-19 @ 07:38]  SCr 2.66 [03-18 @ 06:54]  SCr 1.76 [03-17 @ 06:16]

## 2018-03-21 NOTE — PROGRESS NOTE ADULT - PROBLEM SELECTOR PLAN 6
- HSC 5000 units Q8 hours   GI ppx with Protonix 40mg daily - HSC 5000 units Q8 hours   GI ppx with Protonix 40mg daily  added Reglan for suspected gastroparesis 3/20- vomiting resolved

## 2018-03-21 NOTE — PROGRESS NOTE ADULT - ASSESSMENT
67 y/o F with T2DM uncontrolled with neuropathy and ESRD now on HD on insulin, osteoporosis, HTN, here with acute respiratory failure 2/2 influenza s/p CVA and PEA arrest x 2, and MSSA bacteremia on month long tx for aspiration pna, s/p PEG and trach placement. Getting tube feeds consistently today and tolerating well. BG goal (100-180mg/dl). BG improved on BID NPH w/tube feeds being held overnight from 12am-6am. Will reassess today based on glucose values and determine if TID NPH will be required.

## 2018-03-21 NOTE — PROGRESS NOTE ADULT - SUBJECTIVE AND OBJECTIVE BOX
Patient is a 68y old  Female who presents with a chief complaint of Fever, total body weakness (02 Feb 2018 13:44)      Interval Events:    REVIEW OF SYSTEMS:  [ ] Positive  [ ] All other systems negative  [ ] Unable to assess ROS because ________    Vital Signs Last 24 Hrs  T(C): 37.1 (03-21-18 @ 05:49), Max: 37.2 (03-20-18 @ 13:49)  T(F): 98.7 (03-21-18 @ 05:49), Max: 98.9 (03-20-18 @ 13:49)  HR: 98 (03-21-18 @ 05:49) (86 - 101)  BP: 138/68 (03-21-18 @ 05:49) (126/72 - 144/75)  RR: 20 (03-21-18 @ 05:49) (18 - 22)  SpO2: 99% (03-21-18 @ 05:49) (97% - 100%)    PHYSICAL EXAM:  HEENT:   [ ]Tracheostomy:  [ ]Pupils equal  [ ]No oral lesions  [ ]Abnormal    SKIN  [ ]No Rash  [ ] Abnormal  [ ] pressure    CARDIAC  [ ]Regular  [ ]Abnormal    PULMONARY  [ ]Bilateral Clear Breath Sounds  [ ]Normal Excursion  [ ]Abnormal    GI  [ ]PEG      [ ] +BS		              [ ]Soft, nondistended, nontender	  [ ]Abnormal    MUSCULOSKELETAL                                   [ ]Bedbound                 [ ]Abnormal    [ ]Ambulatory/OOB to chair                           EXTREMITIES                                         [ ]Normal  [ ]Edema                           NEUROLOGIC  [ ] Normal, non focal  [ ] Focal findings:    PSYCHIATRIC  [ ]Alert and appropriate  [ ] Sedated	 [ ]Agitated    :  Wheeler: [ ] Yes, if yes: Date of Placement:                   [  ] No    LINES: Central Lines [ ] Yes, if yes: Date of Placement                                     [  ] No    HOSPITAL MEDICATIONS:  MEDICATIONS  (STANDING):  ALBUTerol/ipratropium for Nebulization 3 milliLiter(s) Nebulizer every 6 hours  aspirin  chewable 81 milliGRAM(s) Oral daily  calamine Lotion 1 Application(s) Topical daily  ceFAZolin   IVPB 2000 milliGRAM(s) IV Intermittent <User Schedule>  ceFAZolin   IVPB 3000 milliGRAM(s) IV Intermittent <User Schedule>  dextrose 5%. 1000 milliLiter(s) (50 mL/Hr) IV Continuous <Continuous>  dextrose 50% Injectable 12.5 Gram(s) IV Push once  dextrose 50% Injectable 25 Gram(s) IV Push once  epoetin odalis Injectable 50745 Unit(s) SubCutaneous <User Schedule>  heparin  Injectable 5000 Unit(s) SubCutaneous every 8 hours  insulin lispro (HumaLOG) corrective regimen sliding scale   SubCutaneous every 6 hours  insulin NPH human recombinant 8 Unit(s) SubCutaneous <User Schedule>  labetalol 100 milliGRAM(s) Oral two times a day  metoclopramide 5 milliGRAM(s) Oral every 8 hours  nystatin Powder 1 Application(s) Topical two times a day  sodium chloride 3%  Inhalation 3 milliLiter(s) Inhalation four times a day    MEDICATIONS  (PRN):  acetaminophen    Suspension. 650 milliGRAM(s) Oral every 6 hours PRN Mild Pain (1 - 3)  chlorhexidine 0.12% Liquid 15 milliLiter(s) Swish and Spit every 4 hours PRN mouth hygiene  glucagon  Injectable 1 milliGRAM(s) IntraMuscular once PRN Glucose LESS THAN 70 milligrams/deciliter  hydrocortisone 1% Ointment 1 Application(s) Topical every 8 hours PRN Rash and/or Itching      LABS:                        7.9    7.5   )-----------( 343      ( 20 Mar 2018 07:15 )             25.1     03-20    136  |  95<L>  |  17  ----------------------------<  224<H>  3.9   |  30  |  1.72<H>    Ca    9.2      20 Mar 2018 07:13  Phos  2.6     03-20  Mg     2.1     03-20          Arterial Blood Gas:  03-19 @ 12:06  7.40/47/95/28/98/3.3  ABG lactate: --      CAPILLARY BLOOD GLUCOSE    MICROBIOLOGY:     RADIOLOGY:  [ ] Reviewed and interpreted by me Patient is a 68y old  Female who presents with a chief complaint of Fever, total body weakness (02 Feb 2018 13:44)      Interval Events:  no further episode of vomiting     REVIEW OF SYSTEMS:  [ ] Positive  [x ] All other systems negative  [ ] Unable to assess ROS because ________    Vital Signs Last 24 Hrs  T(C): 37.1 (03-21-18 @ 05:49), Max: 37.2 (03-20-18 @ 13:49)  T(F): 98.7 (03-21-18 @ 05:49), Max: 98.9 (03-20-18 @ 13:49)  HR: 98 (03-21-18 @ 05:49) (86 - 101)  BP: 138/68 (03-21-18 @ 05:49) (126/72 - 144/75)  RR: 20 (03-21-18 @ 05:49) (18 - 22)  SpO2: 99% (03-21-18 @ 05:49) (97% - 100%)    PHYSICAL EXAM:  HEENT:   [x ]Tracheostomy: TC  30 ATC  [x ]Pupils equal  [ ]No oral lesions  [ ]Abnormal    SKIN  [x ]No Rash  [ ] Abnormal  [ ] pressure    CARDIAC  [x ]Regular  [ ]Abnormal    PULMONARY  [x ]Bilateral Clear Breath Sounds  [ ]Normal Excursion  [ ]Abnormal    GI  [x ]PEG      [x ] +BS		              [x ]Soft, nondistended, nontender	  [ ]Abnormal    MUSCULOSKELETAL                                   [x ]Bedbound                 [ ]Abnormal    [ ]Ambulatory/OOB to chair                           EXTREMITIES                                         [x ]Normal  [ ]Edema                           NEUROLOGIC  [x ] Normal, non focal  [ ] Focal findings:    PSYCHIATRIC  [ x]Alert and appropriate  [ ] Sedated	 [ ]Agitated    :  Wheeler: [ ] Yes, if yes: Date of Placement:                   [x  ] No    LINES: Central Lines [ ] Yes, if yes: Date of Placement                                     [x  ] No    HOSPITAL MEDICATIONS:  MEDICATIONS  (STANDING):  ALBUTerol/ipratropium for Nebulization 3 milliLiter(s) Nebulizer every 6 hours  aspirin  chewable 81 milliGRAM(s) Oral daily  calamine Lotion 1 Application(s) Topical daily  ceFAZolin   IVPB 2000 milliGRAM(s) IV Intermittent <User Schedule>  ceFAZolin   IVPB 3000 milliGRAM(s) IV Intermittent <User Schedule>  dextrose 5%. 1000 milliLiter(s) (50 mL/Hr) IV Continuous <Continuous>  dextrose 50% Injectable 12.5 Gram(s) IV Push once  dextrose 50% Injectable 25 Gram(s) IV Push once  epoetin odalis Injectable 85916 Unit(s) SubCutaneous <User Schedule>  heparin  Injectable 5000 Unit(s) SubCutaneous every 8 hours  insulin lispro (HumaLOG) corrective regimen sliding scale   SubCutaneous every 6 hours  insulin NPH human recombinant 8 Unit(s) SubCutaneous <User Schedule>  labetalol 100 milliGRAM(s) Oral two times a day  metoclopramide 5 milliGRAM(s) Oral every 8 hours  nystatin Powder 1 Application(s) Topical two times a day  sodium chloride 3%  Inhalation 3 milliLiter(s) Inhalation four times a day    MEDICATIONS  (PRN):  acetaminophen    Suspension. 650 milliGRAM(s) Oral every 6 hours PRN Mild Pain (1 - 3)  chlorhexidine 0.12% Liquid 15 milliLiter(s) Swish and Spit every 4 hours PRN mouth hygiene  glucagon  Injectable 1 milliGRAM(s) IntraMuscular once PRN Glucose LESS THAN 70 milligrams/deciliter  hydrocortisone 1% Ointment 1 Application(s) Topical every 8 hours PRN Rash and/or Itching      LABS:                        7.9    7.5   )-----------( 343      ( 20 Mar 2018 07:15 )             25.1     03-20    136  |  95<L>  |  17  ----------------------------<  224<H>  3.9   |  30  |  1.72<H>    Ca    9.2      20 Mar 2018 07:13  Phos  2.6     03-20  Mg     2.1     03-20          Arterial Blood Gas:  03-19 @ 12:06  7.40/47/95/28/98/3.3  ABG lactate: --      CAPILLARY BLOOD GLUCOSE    MICROBIOLOGY:     RADIOLOGY:  [ ] Reviewed and interpreted by me

## 2018-03-22 LAB
ANION GAP SERPL CALC-SCNC: 13 MMOL/L — SIGNIFICANT CHANGE UP (ref 5–17)
BUN SERPL-MCNC: 18 MG/DL — SIGNIFICANT CHANGE UP (ref 7–23)
CALCIUM SERPL-MCNC: 9.1 MG/DL — SIGNIFICANT CHANGE UP (ref 8.4–10.5)
CHLORIDE SERPL-SCNC: 95 MMOL/L — LOW (ref 96–108)
CO2 SERPL-SCNC: 26 MMOL/L — SIGNIFICANT CHANGE UP (ref 22–31)
CREAT SERPL-MCNC: 1.66 MG/DL — HIGH (ref 0.5–1.3)
GLUCOSE BLDC GLUCOMTR-MCNC: 195 MG/DL — HIGH (ref 70–99)
GLUCOSE BLDC GLUCOMTR-MCNC: 241 MG/DL — HIGH (ref 70–99)
GLUCOSE BLDC GLUCOMTR-MCNC: 248 MG/DL — HIGH (ref 70–99)
GLUCOSE BLDC GLUCOMTR-MCNC: 251 MG/DL — HIGH (ref 70–99)
GLUCOSE SERPL-MCNC: 275 MG/DL — HIGH (ref 70–99)
HCT VFR BLD CALC: 29.5 % — LOW (ref 34.5–45)
HGB BLD-MCNC: 9.2 G/DL — LOW (ref 11.5–15.5)
MAGNESIUM SERPL-MCNC: 2.2 MG/DL — SIGNIFICANT CHANGE UP (ref 1.6–2.6)
MCHC RBC-ENTMCNC: 29.6 PG — SIGNIFICANT CHANGE UP (ref 27–34)
MCHC RBC-ENTMCNC: 31 GM/DL — LOW (ref 32–36)
MCV RBC AUTO: 95.6 FL — SIGNIFICANT CHANGE UP (ref 80–100)
PHOSPHATE SERPL-MCNC: 2.6 MG/DL — SIGNIFICANT CHANGE UP (ref 2.5–4.5)
PLATELET # BLD AUTO: 343 K/UL — SIGNIFICANT CHANGE UP (ref 150–400)
POTASSIUM SERPL-MCNC: 4 MMOL/L — SIGNIFICANT CHANGE UP (ref 3.5–5.3)
POTASSIUM SERPL-SCNC: 4 MMOL/L — SIGNIFICANT CHANGE UP (ref 3.5–5.3)
RBC # BLD: 3.09 M/UL — LOW (ref 3.8–5.2)
RBC # FLD: 16 % — HIGH (ref 10.3–14.5)
SODIUM SERPL-SCNC: 134 MMOL/L — LOW (ref 135–145)
WBC # BLD: 4.9 K/UL — SIGNIFICANT CHANGE UP (ref 3.8–10.5)
WBC # FLD AUTO: 4.9 K/UL — SIGNIFICANT CHANGE UP (ref 3.8–10.5)

## 2018-03-22 PROCEDURE — 99232 SBSQ HOSP IP/OBS MODERATE 35: CPT

## 2018-03-22 PROCEDURE — 99291 CRITICAL CARE FIRST HOUR: CPT

## 2018-03-22 RX ORDER — HUMAN INSULIN 100 [IU]/ML
10 INJECTION, SUSPENSION SUBCUTANEOUS
Qty: 0 | Refills: 0 | Status: DISCONTINUED | OUTPATIENT
Start: 2018-03-22 | End: 2018-03-23

## 2018-03-22 RX ORDER — NYSTATIN/TRIAMCINOLONE ACET
1 OINTMENT (GRAM) TOPICAL THREE TIMES A DAY
Qty: 0 | Refills: 0 | Status: COMPLETED | OUTPATIENT
Start: 2018-03-22 | End: 2018-03-27

## 2018-03-22 RX ADMIN — HEPARIN SODIUM 5000 UNIT(S): 5000 INJECTION INTRAVENOUS; SUBCUTANEOUS at 21:55

## 2018-03-22 RX ADMIN — HEPARIN SODIUM 5000 UNIT(S): 5000 INJECTION INTRAVENOUS; SUBCUTANEOUS at 13:11

## 2018-03-22 RX ADMIN — HUMAN INSULIN 10 UNIT(S): 100 INJECTION, SUSPENSION SUBCUTANEOUS at 17:47

## 2018-03-22 RX ADMIN — Medication 100 MILLIGRAM(S): at 21:55

## 2018-03-22 RX ADMIN — Medication 1 APPLICATION(S): at 14:44

## 2018-03-22 RX ADMIN — SODIUM CHLORIDE 3 MILLILITER(S): 9 INJECTION INTRAMUSCULAR; INTRAVENOUS; SUBCUTANEOUS at 17:09

## 2018-03-22 RX ADMIN — HUMAN INSULIN 10 UNIT(S): 100 INJECTION, SUSPENSION SUBCUTANEOUS at 13:10

## 2018-03-22 RX ADMIN — NYSTATIN CREAM 1 APPLICATION(S): 100000 CREAM TOPICAL at 17:38

## 2018-03-22 RX ADMIN — Medication 2: at 17:47

## 2018-03-22 RX ADMIN — Medication 6: at 06:33

## 2018-03-22 RX ADMIN — NYSTATIN CREAM 1 APPLICATION(S): 100000 CREAM TOPICAL at 06:34

## 2018-03-22 RX ADMIN — Medication 3 MILLILITER(S): at 06:47

## 2018-03-22 RX ADMIN — Medication 100 MILLIGRAM(S): at 10:08

## 2018-03-22 RX ADMIN — Medication 5 MILLIGRAM(S): at 21:55

## 2018-03-22 RX ADMIN — Medication 4: at 23:50

## 2018-03-22 RX ADMIN — Medication 3 MILLILITER(S): at 12:08

## 2018-03-22 RX ADMIN — Medication 1 APPLICATION(S): at 21:56

## 2018-03-22 RX ADMIN — CHLORHEXIDINE GLUCONATE 15 MILLILITER(S): 213 SOLUTION TOPICAL at 17:37

## 2018-03-22 RX ADMIN — Medication 5 MILLIGRAM(S): at 13:11

## 2018-03-22 RX ADMIN — Medication 4: at 13:10

## 2018-03-22 RX ADMIN — HEPARIN SODIUM 5000 UNIT(S): 5000 INJECTION INTRAVENOUS; SUBCUTANEOUS at 06:32

## 2018-03-22 RX ADMIN — SODIUM CHLORIDE 3 MILLILITER(S): 9 INJECTION INTRAMUSCULAR; INTRAVENOUS; SUBCUTANEOUS at 06:47

## 2018-03-22 RX ADMIN — Medication 4: at 01:10

## 2018-03-22 RX ADMIN — SODIUM CHLORIDE 3 MILLILITER(S): 9 INJECTION INTRAMUSCULAR; INTRAVENOUS; SUBCUTANEOUS at 12:09

## 2018-03-22 RX ADMIN — HUMAN INSULIN 8 UNIT(S): 100 INJECTION, SUSPENSION SUBCUTANEOUS at 06:33

## 2018-03-22 RX ADMIN — Medication 3 MILLILITER(S): at 17:09

## 2018-03-22 RX ADMIN — Medication 81 MILLIGRAM(S): at 12:20

## 2018-03-22 RX ADMIN — Medication 5 MILLIGRAM(S): at 06:34

## 2018-03-22 RX ADMIN — CALAMINE 8% AND ZINC OXIDE 8% 1 APPLICATION(S): 160 LOTION TOPICAL at 12:21

## 2018-03-22 RX ADMIN — Medication 100 MILLIGRAM(S): at 02:31

## 2018-03-22 NOTE — SPEAKING VALVE EVALUATION - RECOMMENDATIONS
-Placement of speaking valve as tolerated with strict adherence to parameters below  -If no improvement in voice, consider ENT consult   - Consider speech-language evaluation   -Acute rehab

## 2018-03-22 NOTE — SPEAKING VALVE EVALUATION - DIAGNOSTIC IMPRESSIONS
Chart reviewed and case d/w NP Amena, RT Gene, and pt's daughter. Attempted to see for speaking valve evaluation; however, pt getting HD and as per daughter, too fatigued during and after to participate. This service to f/u on 3/22.
Pt with good tolerance for Passy-Britney Speaking Valve while maintained on 30% TC, with #6 Portex cuffless trach in place
Chart reviewed. Attempted to see pt for speaking valve evaluation today. As per NP, Pt vomiting throughout the day and with temp, to hold evaluation today.

## 2018-03-22 NOTE — PROGRESS NOTE ADULT - ASSESSMENT
67F PMH HTN, DMT2 (70/30 TDD: 90 on admission), Breast Cancer (diagnosed in Feb 2017) currently on Herceptin (last dose Jan 20th), originally presented 1/30 with Fever found to have influenza subsequently went into PEA arrest x 2 with ROSC, complicated by bilateral pneumothoraces s/p 3 chest tubes and paracentesis decompression of the abdomen, then found to have PE s/p tPA 1/30, also s/p new CVA, and also new ESRD requiring almost daily HD.     First MICU admission:  Patient was admitted to MICU for further management. Patient was found to have acute renal failure and shock liver. Nephrology was consulted and patient was started on dialysis. Patient was started on Tamiflu for flu and zosyn for possible aspiration pneumonia. Patients chest tubes and peritoneal drain were removed. Patient redeveloped a pneumothorax on the L side and chest tube was reinserted. 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 Urine legionella was negative and azithromycin was discontinued. Patient continued to be anuric and continued to receive dialysis. Shock liver resolved. Patient was extubated on 2/8/17. Patient completed a 5 day course of tamiflu and 7 day course of zosyn and did not show signs of infection. Patients pneumothorax on L side resolved and chest tube was removed. Patients mental status improved and was alert and oriented x1-2 and spontaneously moves all extremities. Patient had an NG tube placed pending an official speech and swallow eval. Patient failed speech and swallow an a repeat speech and swallow was planned. Patient was then transferred to the floors for further management.     Second MICU admission on 2/21.-3/1  RRT initially called this AM for worsening tachypnea. Pt was evaluated at bedside breathing around 30s with belly breathing, hypoxic to 85% on 40% FIO2, increased to 100% with improvement in oxygen saturation to 97%. Cxray read as worsening pulm edema. ABG showed hypercapnia. Renal called for urgent HD. Also concern for aspiration PNA as increasing density of RLL.     During MICU admission patient treated with 5 days of vanc and zosyn, found to have MSSA bacteremia and MSSA in the sputum, now transitioned to cefazolin until 3/22 (post dialysis). Bacteremia has cleared since 2/22. Patient was extubated on 3/1 to nasal cannula, and was saturating well.    MICU stay #3- 3/12-hypoxic resp. failure--MICU--trach and peg 3/14-15 respectively--transfered back to RCU 3/16 in pm  3/20: multiple episodes of vomiting reglan initiated  3/21- no further vomiting  3/22 - downsized to 6 portex uncuffed in anticipation of speaking valve today

## 2018-03-22 NOTE — SPEAKING VALVE EVALUATION - REMOVE VALVE FOR
d/w Pt, , NP/SpO2 < 92%/Increased HR (1.5 x baseline)/Increase RR (1.5 x baseline)/Evidence of air trapping/Sleep/Aerosolized respiratory treatments/Increased work of breathing/Diaphoresis/Subjective complaints

## 2018-03-22 NOTE — PROGRESS NOTE ADULT - PROBLEM SELECTOR PLAN 1
-test BG Q6h  -Increase NPH 10 units TID (6am, 12pm, 6pm-hold NPH if tube feeds off or BG less than 100mg/dl)  -c/w Humalog moderate correction scale Q6h  -Hold 12am tube feeds/nph  -will monitor.   pager: [phone]

## 2018-03-22 NOTE — SPEAKING VALVE EVALUATION - RECOMMENDED SPEAKING VALVE GUIDELINES
Placement of speaking valve as tolerated/During waking hours only/Place on hub of tracheostomy.../general supervision

## 2018-03-22 NOTE — PROGRESS NOTE ADULT - SUBJECTIVE AND OBJECTIVE BOX
Patient is a 68y old  Female who presents with a chief complaint of Fever, total body weakness (02 Feb 2018 13:44)      Interval Events:    REVIEW OF SYSTEMS:  [ ] Positive  [x ] All other systems negative  [ ] Unable to assess ROS because ________    Vital Signs Last 24 Hrs  T(C): 36.8 (03-22-18 @ 06:00), Max: 37.7 (03-21-18 @ 15:36)  T(F): 98.2 (03-22-18 @ 06:00), Max: 99.9 (03-21-18 @ 15:36)  HR: 96 (03-22-18 @ 09:16) (87 - 110)  BP: 148/76 (03-22-18 @ 06:00) (121/65 - 154/74)  RR: 20 (03-22-18 @ 09:16) (18 - 20)  SpO2: 100% (03-22-18 @ 09:16) (99% - 100%)    PHYSICAL EXAM:  HEENT:   [x ]Tracheostomy: 6 portex uncuffed  [ ]Pupils equal  [ ]No oral lesions  [ ]Abnormal    SKIN  [ x]No Rash  [ ] Abnormal  [ ] pressure    CARDIAC  [x ]Regular  [ ]Abnormal    PULMONARY  [x ]Bilateral Clear Breath Sounds  [ ]Normal Excursion  [ ]Abnormal    GI  [x ]PEG      [x ] +BS		              [x ]Soft, nondistended, nontender	  [ ]Abnormal    MUSCULOSKELETAL                                   [ ]Bedbound                 [ ]Abnormal    [x ]Ambulatory/OOB to chair                           EXTREMITIES                                         [x ]Normal  [ ]Edema                           NEUROLOGIC  [ x] Normal, non focal-generalized weekness  [ ] Focal findings:    PSYCHIATRIC  [x ]Alert and appropriate  [ ] Sedated	 [ ]Agitated    :  Wheeler: [ ] Yes, if yes: Date of Placement:                   [ x ] No    LINES: Central Lines [x ] Yes, if yes: Date of Placement- Right perma cath 3/8 by IR and [facility]- placed as out patient 5/27/17                                     [  ] No    HOSPITAL MEDICATIONS:  MEDICATIONS  (STANDING):  ALBUTerol/ipratropium for Nebulization 3 milliLiter(s) Nebulizer every 6 hours  aspirin  chewable 81 milliGRAM(s) Oral daily  calamine Lotion 1 Application(s) Topical daily  ceFAZolin   IVPB 2000 milliGRAM(s) IV Intermittent <User Schedule>  ceFAZolin   IVPB 3000 milliGRAM(s) IV Intermittent <User Schedule>  dextrose 5%. 1000 milliLiter(s) (50 mL/Hr) IV Continuous <Continuous>  dextrose 50% Injectable 12.5 Gram(s) IV Push once  dextrose 50% Injectable 25 Gram(s) IV Push once  epoetin odalis Injectable 46047 Unit(s) SubCutaneous <User Schedule>  heparin  Injectable 5000 Unit(s) SubCutaneous every 8 hours  insulin lispro (HumaLOG) corrective regimen sliding scale   SubCutaneous every 6 hours  insulin NPH human recombinant 8 Unit(s) SubCutaneous <User Schedule>  labetalol 100 milliGRAM(s) Oral two times a day  metoclopramide 5 milliGRAM(s) Oral every 8 hours  nystatin Powder 1 Application(s) Topical two times a day  sodium chloride 3%  Inhalation 3 milliLiter(s) Inhalation four times a day    MEDICATIONS  (PRN):  acetaminophen    Suspension. 650 milliGRAM(s) Oral every 6 hours PRN Mild Pain (1 - 3)  chlorhexidine 0.12% Liquid 15 milliLiter(s) Swish and Spit every 4 hours PRN mouth hygiene  glucagon  Injectable 1 milliGRAM(s) IntraMuscular once PRN Glucose LESS THAN 70 milligrams/deciliter  hydrocortisone 1% Ointment 1 Application(s) Topical every 8 hours PRN Rash and/or Itching      LABS:                        9.2    4.9   )-----------( 343      ( 22 Mar 2018 06:27 )             29.5     03-22    134<L>  |  95<L>  |  18  ----------------------------<  275<H>  4.0   |  26  |  1.66<H>    Ca    9.1      22 Mar 2018 06:26  Phos  2.6     03-22  Mg     2.2     03-22              CAPILLARY BLOOD GLUCOSE    MICROBIOLOGY:     RADIOLOGY:  [ ] Reviewed and interpreted by me

## 2018-03-22 NOTE — PROGRESS NOTE ADULT - SUBJECTIVE AND OBJECTIVE BOX
[facility] Division of Kidney Diseases & Hypertension  FOLLOW UP NOTE  [phone]--------------------------------------------------------------------------------  Chief Complaint:RUSS      24 hour events/subjective:    No acute events noted.   Patient had HD yesterday; tolerated it well      PAST HISTORY  --------------------------------------------------------------------------------  No significant changes to PMH, PSH, FHx, SHx, unless otherwise noted    ALLERGIES & MEDICATIONS  --------------------------------------------------------------------------------  Allergies    doxycycline (Rash)  isoniazid (Rash)  NIFEdipine (Urticaria; Hives)  vitamin E (Short breath; Urticaria; Hives)    Intolerances      Standing Inpatient Medications  ALBUTerol/ipratropium for Nebulization 3 milliLiter(s) Nebulizer every 6 hours  aspirin  chewable 81 milliGRAM(s) Oral daily  calamine Lotion 1 Application(s) Topical daily  ceFAZolin   IVPB 2000 milliGRAM(s) IV Intermittent <User Schedule>  ceFAZolin   IVPB 3000 milliGRAM(s) IV Intermittent <User Schedule>  dextrose 5%. 1000 milliLiter(s) IV Continuous <Continuous>  dextrose 50% Injectable 12.5 Gram(s) IV Push once  dextrose 50% Injectable 25 Gram(s) IV Push once  epoetin odalis Injectable 79738 Unit(s) SubCutaneous <User Schedule>  heparin  Injectable 5000 Unit(s) SubCutaneous every 8 hours  insulin lispro (HumaLOG) corrective regimen sliding scale   SubCutaneous every 6 hours  insulin NPH human recombinant 10 Unit(s) SubCutaneous <User Schedule>  labetalol 100 milliGRAM(s) Oral two times a day  metoclopramide 5 milliGRAM(s) Oral every 8 hours  nystatin Powder 1 Application(s) Topical two times a day  sodium chloride 3%  Inhalation 3 milliLiter(s) Inhalation four times a day    PRN Inpatient Medications  acetaminophen    Suspension. 650 milliGRAM(s) Oral every 6 hours PRN  chlorhexidine 0.12% Liquid 15 milliLiter(s) Swish and Spit every 4 hours PRN  glucagon  Injectable 1 milliGRAM(s) IntraMuscular once PRN  hydrocortisone 1% Ointment 1 Application(s) Topical every 8 hours PRN      REVIEW OF SYSTEMS  --------------------------------------------------------------------------------  Unable to obtain.     VITALS/PHYSICAL EXAM  --------------------------------------------------------------------------------  T(C): 36.8 (03-22-18 @ 06:00), Max: 37.7 (03-21-18 @ 15:36)  HR: 96 (03-22-18 @ 09:16) (87 - 110)  BP: 148/76 (03-22-18 @ 06:00) (121/65 - 154/74)  RR: 20 (03-22-18 @ 09:16) (18 - 20)  SpO2: 100% (03-22-18 @ 09:16) (99% - 100%)  Wt(kg): --        03-21-18 @ 07:01  -  03-22-18 @ 07:00  --------------------------------------------------------  IN: 1080 mL / OUT: 400 mL / NET: 680 mL      Physical Exam:  	     Gen: on trach collar.   	HEENT: PERRL, supple neck, no jvp  	Pulm: CTA B/L  	CV: RRR, S1S2; no rub  	Back: No spinal or CVA tenderness; no sacral edema  	Abd: +BS, soft, nontender/nondistended  	Ext: no edema  	Neuro: Sedated  	Skin: Warm, without rashes  	Vascular access: left tunneled HD catheter present.     LABS/STUDIES  --------------------------------------------------------------------------------              9.2    4.9   >-----------<  343      [03-22-18 @ 06:27]              29.5     134  |  95  |  18  ----------------------------<  275      [03-22-18 @ 06:26]  4.0   |  26  |  1.66        Ca     9.1     [03-22-18 @ 06:26]      Mg     2.2     [03-22-18 @ 06:26]      Phos  2.6     [03-22-18 @ 06:26]            Creatinine Trend:  SCr 1.66 [03-22 @ 06:26]  SCr 2.54 [03-21 @ 07:09]  SCr 1.72 [03-20 @ 07:13]  SCr 3.32 [03-19 @ 07:38]  SCr 2.66 [03-18 @ 06:54]

## 2018-03-22 NOTE — PROGRESS NOTE ADULT - ATTENDING COMMENTS
Pt is a 66 yo F with h/o HTN, DM and breast Ca originally presented 1/30 with fever found to have influenza and subsequently went into PEA arrest x 2 with ROSC. Code complicated by bilateral pneumothoraces s/p 3 chest tubes and paracentesis decompression of the abdomen and then pt found to have PE s/p tPA . In the MICU pt had RUSS requiring HD and Shock liver.  Pt had a MRI of head which showed watershed infarct and acute L occipital infarct. Pt was extubated on 2/8/17 and eventually pt transferred to [facility]. On 2/21 RRT called for worsening tachypnea, hypoxia and CXR: worsening pulm edema. ABG showed hypercapnia. Pt transferred/intubated to the ICU and found to have MSSA bacteremia and MSSA in the sputum. Pt extubated on 3/1. On 3/12 pt s/p hypoxic resp. failure/ s/p intubation. s/p trach and peg 3/14-15 respectively and then transferred back to RCU 3/16.     Resp: Cont TC/ PMV as per Speech/Swallow assessment  ID: Finish course of Ancef (3/22)  CVS: May need to increase Labetalol  FEN/GI: Cont Reglan and enteral feeds   Endo: Adjust NPH and Lispro as per Endo  Renal: HD/Epogen as per Renal  PT/OT/OOB->chair  Social:  at the bedside and updated    CCT: 35 min

## 2018-03-22 NOTE — PROGRESS NOTE ADULT - ASSESSMENT
69 y/o F with T2DM uncontrolled with neuropathy and ESRD now on HD on insulin, osteoporosis, HTN, here with acute respiratory failure 2/2 influenza s/p CVA and PEA arrest x 2, and MSSA bacteremia on month long tx for aspiration pna, s/p PEG and trach placement. On Nepro 40 cc/hr for 18h/day (6am-12am)-having hyperglycemia on current insulin regimen. Will increase and add TID NPH dosing to improve glycemic control. BG goal (100-180mg/dl).

## 2018-03-22 NOTE — SPEAKING VALVE EVALUATION - ADDITIONAL COMMENTS
Extensive family counseling and instruction provided.  able to place and remove effectively and able to comprehend contraindications for placement and indications for removal

## 2018-03-22 NOTE — PROGRESS NOTE ADULT - PROBLEM SELECTOR PLAN 5
2/2 asp PNA which has Resolved  Without excessive secretions  Trach on 3/14. TC ATC since 3/18. ronald  30%  downsized 3/22 to 6 portex uncuffed without any issues. Patient tolerated procedure well.

## 2018-03-22 NOTE — PROGRESS NOTE ADULT - PROBLEM SELECTOR PLAN 1
from ATN in setting of cardiac arrest. Pt continues to be oligo-anuric, requiring dialysis. No evidence of renal recovery at present.  Patient s/p tunneled HD catheter placement  by IR . Last HD was done on 3/21. Labs reviewed from today; no plan for HD today. Monitor BMP daily.

## 2018-03-22 NOTE — PROGRESS NOTE ADULT - SUBJECTIVE AND OBJECTIVE BOX
Diabetes Follow up note:  Interval Hx:  69 y/o F with T2DM uncontrolled with neuropathy and ESRD now on HD on insulin, osteoporosis, HTN, here with acute respiratory failure 2/2 influenza s/p CVA and PEA arrest x 2, and MSSA bacteremia on month long tx for aspiration pna, re-intubated on 3.11.18 for hypoxia/respiratory distress s/p Trach and PEG. Pt now on tube feeds 18h/day. BG values mostly above goal on 200's. Per -tube feed continued til around 2am-held til 6am this morning. Pt seen at bedside. Sleeping at time of visit.     Review of Systems:   unable to assess. Tolerating TF w/out residuals  MEDS:    insulin lispro (HumaLOG) corrective regimen sliding scale   SubCutaneous every 6 hours  insulin NPH human recombinant 8 Unit(s) SubCutaneous <User Schedule>    ceFAZolin   IVPB 2000 milliGRAM(s) IV Intermittent <User Schedule>  ceFAZolin   IVPB 3000 milliGRAM(s) IV Intermittent <User Schedule>    Allergies    doxycycline (Rash)  isoniazid (Rash)  NIFEdipine (Urticaria; Hives)  vitamin E (Short breath; Urticaria; Hives)      PE:  General: Female lying in bed. NAD.   Vital Signs Last 24 Hrs  T(C): 36.8 (22 Mar 2018 06:00), Max: 37.7 (21 Mar 2018 15:36)  T(F): 98.2 (22 Mar 2018 06:00), Max: 99.9 (21 Mar 2018 15:36)  HR: 96 (22 Mar 2018 09:16) (87 - 110)  BP: 148/76 (22 Mar 2018 06:00) (121/65 - 154/74)  BP(mean): --  RR: 20 (22 Mar 2018 09:16) (18 - 20)  SpO2: 100% (22 Mar 2018 09:16) (99% - 100%)  HEENT: Trach in place w/speaking valve on trach collar.   Abd: Soft, NT,ND, PEG in place.   Extremities: Warm. no edema. R lower leg w/brace  Neuro: Sleepy at time of visit.     LABS:    POCT Blood Glucose.: 251 mg/dL (03-22-18 @ 06:09)  POCT Blood Glucose.: 242 mg/dL (03-21-18 @ 23:37)  POCT Blood Glucose.: 215 mg/dL (03-21-18 @ 17:43)  POCT Blood Glucose.: 140 mg/dL (03-21-18 @ 12:10)  POCT Blood Glucose.: 202 mg/dL (03-21-18 @ 06:57)  POCT Blood Glucose.: 178 mg/dL (03-21-18 @ 05:47)  POCT Blood Glucose.: 292 mg/dL (03-20-18 @ 23:50)  POCT Blood Glucose.: 217 mg/dL (03-20-18 @ 18:05)  POCT Blood Glucose.: 243 mg/dL (03-20-18 @ 12:30)  POCT Blood Glucose.: 212 mg/dL (03-20-18 @ 06:45)  POCT Blood Glucose.: 255 mg/dL (03-19-18 @ 23:51)  POCT Blood Glucose.: 245 mg/dL (03-19-18 @ 17:32)  POCT Blood Glucose.: 286 mg/dL (03-19-18 @ 11:43)                            9.2    4.9   )-----------( 343      ( 22 Mar 2018 06:27 )             29.5       03-22    134<L>  |  95<L>  |  18  ----------------------------<  275<H>  4.0   |  26  |  1.66<H>    Ca    9.1      22 Mar 2018 06:26  Phos  2.6     03-22  Mg     2.2     03-22          Hemoglobin A1C, Whole Blood: 8.6 % <H> [4.0 - 5.6] (01-31-18 @ 07:15)  Hemoglobin A1C, Whole Blood: 8.7 % <H> [4.0 - 5.6] (01-31-18 @ 05:51)            Contact number: marcelino [phone] or [phone]

## 2018-03-22 NOTE — SPEAKING VALVE EVALUATION - OBSERVATIONS
Pt asleep upon encounter - required maximal prompting to alert initially. 45 min assessment with family participation- noted attention, LT, ST memory deficits.

## 2018-03-23 LAB
ANION GAP SERPL CALC-SCNC: 14 MMOL/L — SIGNIFICANT CHANGE UP (ref 5–17)
BUN SERPL-MCNC: 31 MG/DL — HIGH (ref 7–23)
CALCIUM SERPL-MCNC: 9.4 MG/DL — SIGNIFICANT CHANGE UP (ref 8.4–10.5)
CHLORIDE SERPL-SCNC: 95 MMOL/L — LOW (ref 96–108)
CO2 SERPL-SCNC: 26 MMOL/L — SIGNIFICANT CHANGE UP (ref 22–31)
CREAT SERPL-MCNC: 2.43 MG/DL — HIGH (ref 0.5–1.3)
GLUCOSE BLDC GLUCOMTR-MCNC: 169 MG/DL — HIGH (ref 70–99)
GLUCOSE BLDC GLUCOMTR-MCNC: 194 MG/DL — HIGH (ref 70–99)
GLUCOSE BLDC GLUCOMTR-MCNC: 224 MG/DL — HIGH (ref 70–99)
GLUCOSE BLDC GLUCOMTR-MCNC: 232 MG/DL — HIGH (ref 70–99)
GLUCOSE SERPL-MCNC: 223 MG/DL — HIGH (ref 70–99)
HAV IGM SER-ACNC: SIGNIFICANT CHANGE UP
HBV CORE AB SER-ACNC: SIGNIFICANT CHANGE UP
HBV SURFACE AB SER-ACNC: SIGNIFICANT CHANGE UP
HBV SURFACE AG SER-ACNC: SIGNIFICANT CHANGE UP
HCT VFR BLD CALC: 26.4 % — LOW (ref 34.5–45)
HCV AB S/CO SERPL IA: 0.24 S/CO — SIGNIFICANT CHANGE UP
HCV AB SERPL-IMP: SIGNIFICANT CHANGE UP
HGB BLD-MCNC: 8.2 G/DL — LOW (ref 11.5–15.5)
MAGNESIUM SERPL-MCNC: 2.3 MG/DL — SIGNIFICANT CHANGE UP (ref 1.6–2.6)
MCHC RBC-ENTMCNC: 29.2 PG — SIGNIFICANT CHANGE UP (ref 27–34)
MCHC RBC-ENTMCNC: 30.9 GM/DL — LOW (ref 32–36)
MCV RBC AUTO: 94.5 FL — SIGNIFICANT CHANGE UP (ref 80–100)
PHOSPHATE SERPL-MCNC: 3 MG/DL — SIGNIFICANT CHANGE UP (ref 2.5–4.5)
PLATELET # BLD AUTO: 365 K/UL — SIGNIFICANT CHANGE UP (ref 150–400)
POTASSIUM SERPL-MCNC: 3.7 MMOL/L — SIGNIFICANT CHANGE UP (ref 3.5–5.3)
POTASSIUM SERPL-SCNC: 3.7 MMOL/L — SIGNIFICANT CHANGE UP (ref 3.5–5.3)
RBC # BLD: 2.8 M/UL — LOW (ref 3.8–5.2)
RBC # FLD: 16 % — HIGH (ref 10.3–14.5)
SODIUM SERPL-SCNC: 135 MMOL/L — SIGNIFICANT CHANGE UP (ref 135–145)
WBC # BLD: 3.9 K/UL — SIGNIFICANT CHANGE UP (ref 3.8–10.5)
WBC # FLD AUTO: 3.9 K/UL — SIGNIFICANT CHANGE UP (ref 3.8–10.5)

## 2018-03-23 PROCEDURE — 99233 SBSQ HOSP IP/OBS HIGH 50: CPT | Mod: GC

## 2018-03-23 PROCEDURE — 99232 SBSQ HOSP IP/OBS MODERATE 35: CPT

## 2018-03-23 PROCEDURE — 99233 SBSQ HOSP IP/OBS HIGH 50: CPT

## 2018-03-23 RX ORDER — INSULIN HUMAN 100 [IU]/ML
12 INJECTION, SOLUTION SUBCUTANEOUS EVERY 6 HOURS
Qty: 0 | Refills: 0 | Status: DISCONTINUED | OUTPATIENT
Start: 2018-03-23 | End: 2018-03-23

## 2018-03-23 RX ORDER — ONDANSETRON 8 MG/1
4 TABLET, FILM COATED ORAL ONCE
Qty: 0 | Refills: 0 | Status: COMPLETED | OUTPATIENT
Start: 2018-03-23 | End: 2018-03-23

## 2018-03-23 RX ORDER — HUMAN INSULIN 100 [IU]/ML
12 INJECTION, SUSPENSION SUBCUTANEOUS EVERY 6 HOURS
Qty: 0 | Refills: 0 | Status: DISCONTINUED | OUTPATIENT
Start: 2018-03-23 | End: 2018-03-28

## 2018-03-23 RX ORDER — HUMAN INSULIN 100 [IU]/ML
12 INJECTION, SUSPENSION SUBCUTANEOUS EVERY 6 HOURS
Qty: 0 | Refills: 0 | Status: DISCONTINUED | OUTPATIENT
Start: 2018-03-23 | End: 2018-03-23

## 2018-03-23 RX ADMIN — HEPARIN SODIUM 5000 UNIT(S): 5000 INJECTION INTRAVENOUS; SUBCUTANEOUS at 14:50

## 2018-03-23 RX ADMIN — Medication 1 APPLICATION(S): at 14:50

## 2018-03-23 RX ADMIN — Medication 100 MILLIGRAM(S): at 08:08

## 2018-03-23 RX ADMIN — Medication 4: at 18:21

## 2018-03-23 RX ADMIN — CALAMINE 8% AND ZINC OXIDE 8% 1 APPLICATION(S): 160 LOTION TOPICAL at 12:25

## 2018-03-23 RX ADMIN — HUMAN INSULIN 10 UNIT(S): 100 INJECTION, SUSPENSION SUBCUTANEOUS at 05:50

## 2018-03-23 RX ADMIN — Medication 5 MILLIGRAM(S): at 05:51

## 2018-03-23 RX ADMIN — Medication 1 APPLICATION(S): at 21:02

## 2018-03-23 RX ADMIN — NYSTATIN CREAM 1 APPLICATION(S): 100000 CREAM TOPICAL at 05:51

## 2018-03-23 RX ADMIN — Medication 1 APPLICATION(S): at 05:51

## 2018-03-23 RX ADMIN — Medication 3 MILLILITER(S): at 06:15

## 2018-03-23 RX ADMIN — SODIUM CHLORIDE 3 MILLILITER(S): 9 INJECTION INTRAMUSCULAR; INTRAVENOUS; SUBCUTANEOUS at 06:14

## 2018-03-23 RX ADMIN — Medication 3 MILLILITER(S): at 00:02

## 2018-03-23 RX ADMIN — Medication 4: at 12:25

## 2018-03-23 RX ADMIN — Medication 3 MILLILITER(S): at 17:35

## 2018-03-23 RX ADMIN — ONDANSETRON 4 MILLIGRAM(S): 8 TABLET, FILM COATED ORAL at 23:23

## 2018-03-23 RX ADMIN — NYSTATIN CREAM 1 APPLICATION(S): 100000 CREAM TOPICAL at 18:22

## 2018-03-23 RX ADMIN — Medication 3 MILLILITER(S): at 23:23

## 2018-03-23 RX ADMIN — Medication 5 MILLIGRAM(S): at 21:04

## 2018-03-23 RX ADMIN — Medication 5 MILLIGRAM(S): at 14:50

## 2018-03-23 RX ADMIN — Medication 100 MILLIGRAM(S): at 21:01

## 2018-03-23 RX ADMIN — HEPARIN SODIUM 5000 UNIT(S): 5000 INJECTION INTRAVENOUS; SUBCUTANEOUS at 05:49

## 2018-03-23 RX ADMIN — SODIUM CHLORIDE 3 MILLILITER(S): 9 INJECTION INTRAMUSCULAR; INTRAVENOUS; SUBCUTANEOUS at 12:38

## 2018-03-23 RX ADMIN — HUMAN INSULIN 12 UNIT(S): 100 INJECTION, SUSPENSION SUBCUTANEOUS at 18:21

## 2018-03-23 RX ADMIN — SODIUM CHLORIDE 3 MILLILITER(S): 9 INJECTION INTRAMUSCULAR; INTRAVENOUS; SUBCUTANEOUS at 17:35

## 2018-03-23 RX ADMIN — Medication 3 MILLILITER(S): at 12:37

## 2018-03-23 RX ADMIN — Medication 2: at 05:52

## 2018-03-23 RX ADMIN — HUMAN INSULIN 12 UNIT(S): 100 INJECTION, SUSPENSION SUBCUTANEOUS at 23:20

## 2018-03-23 RX ADMIN — Medication 81 MILLIGRAM(S): at 12:23

## 2018-03-23 RX ADMIN — HEPARIN SODIUM 5000 UNIT(S): 5000 INJECTION INTRAVENOUS; SUBCUTANEOUS at 21:01

## 2018-03-23 RX ADMIN — Medication 2: at 23:19

## 2018-03-23 RX ADMIN — SODIUM CHLORIDE 3 MILLILITER(S): 9 INJECTION INTRAMUSCULAR; INTRAVENOUS; SUBCUTANEOUS at 23:24

## 2018-03-23 RX ADMIN — SODIUM CHLORIDE 3 MILLILITER(S): 9 INJECTION INTRAMUSCULAR; INTRAVENOUS; SUBCUTANEOUS at 00:02

## 2018-03-23 RX ADMIN — HUMAN INSULIN 10 UNIT(S): 100 INJECTION, SUSPENSION SUBCUTANEOUS at 12:23

## 2018-03-23 NOTE — PROGRESS NOTE ADULT - ATTENDING COMMENTS
Seen and examined with resident. Agree with note.   Patient with gait dysfunction, debility, anoxic encephalopathy.  Patient will need acute rehabilitation when stable.

## 2018-03-23 NOTE — PROGRESS NOTE ADULT - SUBJECTIVE AND OBJECTIVE BOX
Diabetes Follow up note:  Interval Hx: 69 y/o F with T2DM uncontrolled with neuropathy and ESRD now on HD on insulin, osteoporosis, HTN, here with acute respiratory failure 2/2 influenza s/p CVA and PEA arrest x 2, and MSSA bacteremia on month long tx for aspiration pna> off anibiotics today. Re-intubated on 3.11.18 for hypoxia/respiratory distress s/p Trach and PEG. Pt now on tube feeds of Nepro 24 hour/day instead of 18 hour/day at 40cc/hr.  BG values mostly above goal on 200's while on present NPH doses.       Review of Systems: trach with speaking valve but pt doesn't want to talk at time of visit.  at bedside answering to questions.  Tolerating TF without any N/V/D or abd pain per /staff/team. RD recommending to increase TFs to around the clock today.        MEDS:  insulin lispro (HumaLOG) corrective regimen sliding scale   SubCutaneous every 6 hours  insulin NPH human recombinant 10 Unit(s) SubCutaneous <User Schedule>    Allergies    doxycycline (Rash)  isoniazid (Rash)  NIFEdipine (Urticaria; Hives)  vitamin E (Short breath; Urticaria; Hives)      PE:  General: Female lying in bed. NAD.  at bedside   Vital Signs Last 24 Hrs  T(C): 36.8 (03-23-18 @ 13:37), Max: 37.1 (03-22-18 @ 20:43)  T(F): 98.3 (03-23-18 @ 13:37), Max: 98.8 (03-22-18 @ 20:43)  HR: 99 (03-23-18 @ 13:37) (83 - 99)  BP: 145/75 (03-23-18 @ 13:37) (144/78 - 150/78)  BP(mean): --  RR: 18 (03-23-18 @ 13:37) (18 - 22)  SpO2: 100% (03-23-18 @ 13:37) (95% - 100%)  HEENT: Trach in place w/speaking valve on trach collar.   Abd: Soft, NT,ND, PEG in place with TFs going at 40cc/hr.   Extremities: Warm. no edema. R lower leg w/brace  Neuro: Alert, awake but doesn't want to use speaking valve.    LABS:  POCT Blood Glucose.: 232 mg/dL (03-23-18 @ 11:55)  POCT Blood Glucose.: 194 mg/dL (03-23-18 @ 05:21)  POCT Blood Glucose.: 241 mg/dL (03-22-18 @ 23:44)  POCT Blood Glucose.: 195 mg/dL (03-22-18 @ 17:38)  POCT Blood Glucose.: 248 mg/dL (03-22-18 @ 12:51)  POCT Blood Glucose.: 251 mg/dL (03-22-18 @ 06:09)  POCT Blood Glucose.: 242 mg/dL (03-21-18 @ 23:37)  POCT Blood Glucose.: 215 mg/dL (03-21-18 @ 17:43)                          8.2    3.9   )-----------( 365      ( 23 Mar 2018 06:23 )             26.4     03-23    135  |  95<L>  |  31<H>  ----------------------------<  223<H>  3.7   |  26  |  2.43<H>    Ca    9.4      23 Mar 2018 06:23  Phos  3.0     03-23  Mg     2.3     03-23      Hemoglobin A1C, Whole Blood: 8.6 % <H> [4.0 - 5.6] (01-31-18 @ 07:15)  Hemoglobin A1C, Whole Blood: 8.7 % <H> [4.0 - 5.6] (01-31-18 @ 05:51)

## 2018-03-23 NOTE — PROGRESS NOTE ADULT - ATTENDING COMMENTS
Pt is a 68 yo F with h/o HTN, DM and breast Ca originally presented 1/30 with fever found to have influenza and subsequently went into PEA arrest x 2 with ROSC. Code complicated by bilateral pneumothoraces s/p 3 chest tubes and paracentesis decompression of the abdomen and then pt found to have PE s/p tPA . In the MICU pt had RUSS requiring HD and Shock liver.  Pt had a MRI of head which showed watershed infarct and acute L occipital infarct. Pt was extubated on 2/8/17 and eventually pt transferred to [facility]. On 2/21 RRT called for worsening tachypnea, hypoxia and CXR: worsening pulm edema. ABG showed hypercapnia. Pt transferred/intubated to the ICU and found to have MSSA bacteremia and MSSA in the sputum. Pt extubated on 3/1. On 3/12 pt s/p hypoxic resp. failure/ s/p intubation. s/p trach and peg 3/14-15 respectively and then transferred back to RCU 3/16.     Resp: Cont TC and PMV (started 3/22) as per Speech/Swallow assessment/ Pt may be decannulated over next few days  ID: s/p Ancef   CVS: Cont Labetalol  FEN/GI: Cont Reglan and enteral feeds/ Once decannulated Speech/Swallow evaluation    Endo: Adjust NPH and Lispro as per Endo  Renal: HD/Epogen as per Renal/ Wheeler placement as requested by Renal  PT/OT/OOB->chair  Social:  at the bedside and updated/ Acute inpatient rehabilitation as per Rehab

## 2018-03-23 NOTE — PROGRESS NOTE ADULT - SUBJECTIVE AND OBJECTIVE BOX
Patient is a 68y old  Female who presents with a chief complaint of Fever, total body weakness (02 Feb 2018 13:44)      Interval Events:    REVIEW OF SYSTEMS:  [ ] Positive  [ ] All other systems negative  [ x] Unable to assess ROS because ________    Vital Signs Last 24 Hrs  T(C): 36.7 (03-23-18 @ 06:00), Max: 37.1 (03-22-18 @ 20:43)  T(F): 98 (03-23-18 @ 06:00), Max: 98.8 (03-22-18 @ 20:43)  HR: 95 (03-23-18 @ 06:15) (83 - 96)  BP: 144/78 (03-23-18 @ 06:00) (144/78 - 163/75)  RR: 20 (03-23-18 @ 06:00) (16 - 20)  SpO2: 99% (03-23-18 @ 06:15) (96% - 100%)    PHYSICAL EXAM:  HEENT:   [x ]Tracheostomy: 6 uncuffed trach  [ ]Pupils equal  [ ]No oral lesions  [ ]Abnormal    SKIN  [ ]No Rash  [ ] Abnormal  [ ] pressure    CARDIAC  [ ]Regular  [ ]Abnormal    PULMONARY  [ ]Bilateral Clear Breath Sounds  [ ]Normal Excursion  [ ]Abnormal    GI  [ ]PEG      [ ] +BS		              [ ]Soft, nondistended, nontender	  [ ]Abnormal    MUSCULOSKELETAL                                   [ ]Bedbound                 [ ]Abnormal    [ ]Ambulatory/OOB to chair                           EXTREMITIES                                         [ ]Normal  [ ]Edema                           NEUROLOGIC  [ ] Normal, non focal  [ ] Focal findings:    PSYCHIATRIC  [ ]Alert and appropriate  [ ] Sedated	 [ ]Agitated    :  Wheeler: [ ] Yes, if yes: Date of Placement:                   [ x ] No    LINES: Central Lines [x ] Yes, if yes: Date of Placement Right perma cath 3/8 by IR and Mediport- placed as out patient 5/27/17                                     [  ] No    HOSPITAL MEDICATIONS:  MEDICATIONS  (STANDING):  ALBUTerol/ipratropium for Nebulization 3 milliLiter(s) Nebulizer every 6 hours  aspirin  chewable 81 milliGRAM(s) Oral daily  calamine Lotion 1 Application(s) Topical daily  dextrose 5%. 1000 milliLiter(s) (50 mL/Hr) IV Continuous <Continuous>  dextrose 50% Injectable 12.5 Gram(s) IV Push once  dextrose 50% Injectable 25 Gram(s) IV Push once  epoetin odalis Injectable 01014 Unit(s) SubCutaneous <User Schedule>  heparin  Injectable 5000 Unit(s) SubCutaneous every 8 hours  insulin lispro (HumaLOG) corrective regimen sliding scale   SubCutaneous every 6 hours  insulin NPH human recombinant 10 Unit(s) SubCutaneous <User Schedule>  labetalol 100 milliGRAM(s) Oral two times a day  metoclopramide 5 milliGRAM(s) Oral every 8 hours  nystatin Powder 1 Application(s) Topical two times a day  nystatin/triamcinolone Ointment 1 Application(s) Topical three times a day  sodium chloride 3%  Inhalation 3 milliLiter(s) Inhalation four times a day    MEDICATIONS  (PRN):  acetaminophen    Suspension. 650 milliGRAM(s) Oral every 6 hours PRN Mild Pain (1 - 3)  chlorhexidine 0.12% Liquid 15 milliLiter(s) Swish and Spit every 4 hours PRN mouth hygiene  glucagon  Injectable 1 milliGRAM(s) IntraMuscular once PRN Glucose LESS THAN 70 milligrams/deciliter  hydrocortisone 1% Ointment 1 Application(s) Topical every 8 hours PRN Rash and/or Itching      LABS:                        8.2    3.9   )-----------( 365      ( 23 Mar 2018 06:23 )             26.4     03-23    135  |  95<L>  |  31<H>  ----------------------------<  223<H>  3.7   |  26  |  2.43<H>    Ca    9.4      23 Mar 2018 06:23  Phos  3.0     03-23  Mg     2.3     03-23              CAPILLARY BLOOD GLUCOSE    MICROBIOLOGY:     RADIOLOGY:  [ ] Reviewed and interpreted by me

## 2018-03-23 NOTE — CHART NOTE - NSCHARTNOTEFT_GEN_A_CORE
Pt seen for follow-up on RCU. Hospital course noted. Speaking valve evaluations attempts on 3/20 and 3/21, however, pt had been nauseous and/or fatigued during these visits. Speaking valve evaluation on 3/22 with good tolerance with recommendation of speaking valve as tolerated.    Source: Patient [X]    Family [X]     other [X]:  at bedside, RN, Medical Chart, PCA    Diet : NPO with enteral nutrition via PEG    RN reports pt tolerating Nepro 1.8cal/ml at goal rate of 40mL x 18 hours via PEG (total volume of 720ml provides 1,296 calories, 58g protein, 719mL free water; 18 calories/kg, 0.8g/kg protein; based on wt of 71.4kg). Pt with documented episodes of nausea + vomiting, however, pt only begins to vomit when suction tube approaches gag reflex per RN. No GI issues, pt with 1 full BM and 2 smears on 3/23 per RN/PCA.    Current Weight: Weight (kg): (1/30) Admission wt of 74.8kg (bed weight); (3/21) Most recent wt of 69.5kg; no edema noted  % Weight Change: 7.1% wt loss x ~1.5 months    Pertinent Medications: MEDICATIONS  (STANDING):  ALBUTerol/ipratropium for Nebulization 3 milliLiter(s) Nebulizer every 6 hours  aspirin  chewable 81 milliGRAM(s) Oral daily  calamine Lotion 1 Application(s) Topical daily  dextrose 5%. 1000 milliLiter(s) (50 mL/Hr) IV Continuous <Continuous>  dextrose 50% Injectable 12.5 Gram(s) IV Push once  dextrose 50% Injectable 25 Gram(s) IV Push once  epoetin odalis Injectable 25495 Unit(s) SubCutaneous <User Schedule>  heparin  Injectable 5000 Unit(s) SubCutaneous every 8 hours  insulin lispro (HumaLOG) corrective regimen sliding scale   SubCutaneous every 6 hours  insulin NPH human recombinant 10 Unit(s) SubCutaneous <User Schedule>  labetalol 100 milliGRAM(s) Oral two times a day  metoclopramide 5 milliGRAM(s) Oral every 8 hours  nystatin Powder 1 Application(s) Topical two times a day  nystatin/triamcinolone Ointment 1 Application(s) Topical three times a day  sodium chloride 3%  Inhalation 3 milliLiter(s) Inhalation four times a day    MEDICATIONS  (PRN):  acetaminophen    Suspension. 650 milliGRAM(s) Oral every 6 hours PRN Mild Pain (1 - 3)  chlorhexidine 0.12% Liquid 15 milliLiter(s) Swish and Spit every 4 hours PRN mouth hygiene  glucagon  Injectable 1 milliGRAM(s) IntraMuscular once PRN Glucose LESS THAN 70 milligrams/deciliter  hydrocortisone 1% Ointment 1 Application(s) Topical every 8 hours PRN Rash and/or Itching    Pertinent Labs:  03-23 Na135 mmol/L Glu 223 mg/dL<H> K+ 3.7 mmol/L Cr  2.43 mg/dL<H> BUN 31 mg/dL<H> 03-23 Phos 3.0 mg/dL  Fingersticks: (3/23) 194-232, (3/22) 195-251, (3/21) 140-251    Skin: skin tears noted on left wrist and left axilla posterior    Estimated Needs: [X] no change since previous assessment    Previous Nutrition Diagnosis: [X] Increased Nutrient Needs (protein/calories)    Nutrition Diagnosis is [X] ongoing- being addressed with enteral nutrition    New Nutrition Diagnosis: [X] not applicable    Recommend: Nepro 1.8cal/ml at goal rate of 40ml/hour x 24 hours via PEG (total volume of 960ml fluid provides 1,728 calories, 78 grams protein, 696ml free fluid; ~25 calories/kg of current weight (69.5kg), 1.5 grams protein/kg IBW (52.3kg). Discussed with NP.     Monitoring and Evaluation:     [X] Tolerance to diet prescription [X] weights [X] follow up per protocol    [X] other: RD remains available as needed and per follow-up protocol. Jennifer Mccauley, MS, RDN, CDN Pager # [phone]

## 2018-03-23 NOTE — PROGRESS NOTE ADULT - PROBLEM SELECTOR PLAN 1
from [facility] in setting of cardiac arrest. Patient s/p tunneled HD catheter placement by IR. Pt oliguric. electrolytes acceptable. Pt does not appear fluid overloaded. Place PATHAK to monitor urine output- will then see if pt will continue to require HD. No plans for HD today. BMP daily. Monitor UO with Pathak.

## 2018-03-23 NOTE — PROGRESS NOTE ADULT - SUBJECTIVE AND OBJECTIVE BOX
[facility] DIVISION OF KIDNEY DISEASES AND HYPERTENSION -- FOLLOW UP NOTE  --------------------------------------------------------------------------------  Chief Complaint:  renal failure    24 hour events/subjective:  Pt seen at bedside. Awake, alert. Denies any pain or SOB.       PAST HISTORY  --------------------------------------------------------------------------------  No significant changes to PMH, PSH, FHx, SHx, unless otherwise noted    ALLERGIES & MEDICATIONS  --------------------------------------------------------------------------------  Allergies    doxycycline (Rash)  isoniazid (Rash)  NIFEdipine (Urticaria; Hives)  vitamin E (Short breath; Urticaria; Hives)    Intolerances      Standing Inpatient Medications  ALBUTerol/ipratropium for Nebulization 3 milliLiter(s) Nebulizer every 6 hours  aspirin  chewable 81 milliGRAM(s) Oral daily  calamine Lotion 1 Application(s) Topical daily  dextrose 5%. 1000 milliLiter(s) IV Continuous <Continuous>  dextrose 50% Injectable 12.5 Gram(s) IV Push once  dextrose 50% Injectable 25 Gram(s) IV Push once  epoetin odalis Injectable 43969 Unit(s) SubCutaneous <User Schedule>  heparin  Injectable 5000 Unit(s) SubCutaneous every 8 hours  insulin lispro (HumaLOG) corrective regimen sliding scale   SubCutaneous every 6 hours  insulin NPH human recombinant 10 Unit(s) SubCutaneous <User Schedule>  labetalol 100 milliGRAM(s) Oral two times a day  metoclopramide 5 milliGRAM(s) Oral every 8 hours  nystatin Powder 1 Application(s) Topical two times a day  nystatin/triamcinolone Ointment 1 Application(s) Topical three times a day  sodium chloride 3%  Inhalation 3 milliLiter(s) Inhalation four times a day    PRN Inpatient Medications  acetaminophen    Suspension. 650 milliGRAM(s) Oral every 6 hours PRN  chlorhexidine 0.12% Liquid 15 milliLiter(s) Swish and Spit every 4 hours PRN  glucagon  Injectable 1 milliGRAM(s) IntraMuscular once PRN  hydrocortisone 1% Ointment 1 Application(s) Topical every 8 hours PRN      REVIEW OF SYSTEMS  --------------------------------------------------------------------------------  Gen: no fatigue  Respiratory: No dyspnea  CV: No chest pain  GI: No abdominal pain    VITALS/PHYSICAL EXAM  --------------------------------------------------------------------------------  T(C): 36.7 (03-23-18 @ 06:00), Max: 37.1 (03-22-18 @ 20:43)  HR: 95 (03-23-18 @ 06:15) (83 - 96)  BP: 144/78 (03-23-18 @ 06:00) (144/78 - 163/75)  RR: 22 (03-23-18 @ 09:30) (16 - 22)  SpO2: 98% (03-23-18 @ 09:30) (96% - 100%)  Wt(kg): --        03-22-18 @ 07:01  -  03-23-18 @ 07:00  --------------------------------------------------------  IN: 1220 mL / OUT: 0 mL / NET: 1220 mL      Physical Exam:  	Gen: NAD, well-appearing  	HEENT: trach collar  	Pulm: CTA B/L  	CV: RRR, S1S2; no rub  	Abd: +BS, soft, +PEG  	: No suprapubic tenderness  	UE: Warm, no edema  	LE: Warm, no edema  	Neuro: awake, alert, follows simple commands  	Skin: Warm, without rashes  	Vascular access: left IJ tunneled HD catheter - no drainage or erythema    LABS/STUDIES  --------------------------------------------------------------------------------              8.2    3.9   >-----------<  365      [03-23-18 @ 06:23]              26.4     135  |  95  |  31  ----------------------------<  223      [03-23-18 @ 06:23]  3.7   |  26  |  2.43        Ca     9.4     [03-23-18 @ 06:23]      Mg     2.3     [03-23-18 @ 06:23]      Phos  3.0     [03-23-18 @ 06:23]            Creatinine Trend:  SCr 2.43 [03-23 @ 06:23]  SCr 1.66 [03-22 @ 06:26]  SCr 2.54 [03-21 @ 07:09]  SCr 1.72 [03-20 @ 07:13]  SCr 3.32 [03-19 @ 07:38]        Iron 32, TIBC 248, %sat 13      [03-07-18 @ 14:34]  Ferritin 174      [03-07-18 @ 14:34]  HbA1c 8.6      [01-31-18 @ 07:15]  Lipid: chol 183, , HDL 10,       [02-07-18 @ 13:34]    HBsAb <3.0      [02-28-18 @ 20:23]  HBsAg Nonreact      [02-28-18 @ 20:23]  HCV 0.24, Nonreact      [02-28-18 @ 20:23]

## 2018-03-23 NOTE — PROGRESS NOTE ADULT - ASSESSMENT
67F PMH HTN, DMT2 (70/30 TDD: 90 on admission), Breast Cancer (diagnosed in Feb 2017) currently on Herceptin (last dose Jan 20th), originally presented 1/30 with Fever found to have influenza subsequently went into PEA arrest x 2 with ROSC, complicated by bilateral pneumothoraces s/p 3 chest tubes and paracentesis decompression of the abdomen, then found to have PE s/p tPA 1/30, also s/p new CVA, and also new ESRD requiring almost daily HD.     First MICU admission:  Patient was admitted to MICU for further management. Patient was found to have acute renal failure and shock liver. Nephrology was consulted and patient was started on dialysis. Patient was started on Tamiflu for flu and zosyn for possible aspiration pneumonia. Patients chest tubes and peritoneal drain were removed. Patient redeveloped a pneumothorax on the L side and chest tube was reinserted. 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 Urine legionella was negative and azithromycin was discontinued. Patient continued to be anuric and continued to receive dialysis. Shock liver resolved. Patient was extubated on 2/8/17. Patient completed a 5 day course of tamiflu and 7 day course of zosyn and did not show signs of infection. Patients pneumothorax on L side resolved and chest tube was removed. Patients mental status improved and was alert and oriented x1-2 and spontaneously moves all extremities. Patient had an NG tube placed pending an official speech and swallow eval. Patient failed speech and swallow an a repeat speech and swallow was planned. Patient was then transferred to the floors for further management.     Second MICU admission on 2/21.-3/1  RRT initially called this AM for worsening tachypnea. Pt was evaluated at bedside breathing around 30s with belly breathing, hypoxic to 85% on 40% FIO2, increased to 100% with improvement in oxygen saturation to 97%. Cxray read as worsening pulm edema. ABG showed hypercapnia. Renal called for urgent HD. Also concern for aspiration PNA as increasing density of RLL.     During MICU admission patient treated with 5 days of vanc and zosyn, found to have MSSA bacteremia and MSSA in the sputum, now transitioned to cefazolin until 3/22 (post dialysis). Bacteremia has cleared since 2/22. Patient was extubated on 3/1 to nasal cannula, and was saturating well.    MICU stay #3- 3/12-hypoxic resp. failure--MICU--trach and peg 3/14-15 respectively--transfered back to RCU 3/16 in pm  3/20: multiple episodes of vomiting reglan initiated  3/21- no further vomiting  3/22 - downsized to 6 portex uncuffed in anticipation of speaking valve today  3/23- PMV to bedside. As per  patient spend most of the time sleeping ? trial of central stimulator??

## 2018-03-23 NOTE — PROGRESS NOTE ADULT - ASSESSMENT
69 y/o F with T2DM uncontrolled with neuropathy and ESRD now on HD on insulin, osteoporosis, HTN, here with acute respiratory failure 2/2 influenza s/p CVA and PEA arrest x 2, and MSSA bacteremia on month long tx for aspiration pna> now off antibiotic. s/p PEG and trach placement. On Nepro 40 cc/hr for 24h/day starting today with hyperglycemia on current insulin regimen. Will increase NPH dose to improve glycemic control. BG goal (100-180mg/dl).

## 2018-03-23 NOTE — PROGRESS NOTE ADULT - ATTENDING COMMENTS
68 yoF with breast cancer, HTN who p/w flu, went into respiratory arrest, then PEA arrest, s/p chest tube, TPA and intubation. Course c/b shock liver, RUSS, pneumoperitoneum, and elevated cardiac enzymes. Chest tubes and peritoneal tubes removed, and shock liver now resolved. Now with Staph aureus bacteremia. Now s/p PEG/trach.      RUSS: Likely ischemic ATN in the setting of shock/sepsis  BUN /Cr up, other electrolytes stable  Please insert willard to measure urine output for the next 24 hours  Hold dialysis today, will reassess in am   Access: PC present    Anemia: DAVID with HD  please recheck iron studies    Vol/HTN: BP stable . 68 yoF with breast cancer, HTN who p/w flu, went into respiratory arrest, then PEA arrest, s/p chest tube, TPA and intubation. Course c/b shock liver, RUSS, pneumoperitoneum, and elevated cardiac enzymes. Chest tubes and peritoneal tubes removed, and shock liver now resolved. Now with Staph aureus bacteremia. Now s/p PEG/trach.      RUSS: Likely ischemic ATN in the setting of shock/sepsis  BUN /Cr up, other electrolytes stable  Please insert willard to measure urine output for the next 24 hours  Hold dialysis today, will reassess in am   Access: PC present    Anemia: DAVID with HD  please recheck iron studies    Vol/HTN: BP stable .    Dr Suzie Salas to cover this weekend

## 2018-03-23 NOTE — PROGRESS NOTE ADULT - ASSESSMENT
67yoF with breast cancer, HTN who p/w flu, went into respiratory arrest, then PEA arrest, s/p chest tube, TPA and intubation. Course c/b shock liver, RUSS, pneumoperitoneum, and elevated cardiac enzymes. Chest tubes and peritoneal tubes removed, and shock liver now resolved. Developed Staph aureus bacteremia. Now s/p trach and PEG. Pt continues to be HD dependant.

## 2018-03-23 NOTE — PROGRESS NOTE ADULT - SUBJECTIVE AND OBJECTIVE BOX
chief complaint: weakness    HPI: 67y/o F hx Breast Cancer (diagnosed in Feb 2017) currently on Herceptin (last dose Jan 20th), HTN, Diabetes on 70/30, presented 1/30/18 with Fever and weakness.  In the ER, patient tested positive for flu. Patient then went into PEA arrest with chest compressions for 10min, received Rosc after Epi x 2, and was intubated. Patient required re-intubation (improperly intubated initially into the esophagus) after she PEA arrested again, with ROSC achieved after 30min with Epi x 5. Patient was also found to have bilateral pneumothoraces, s/p 3 chest tubes, 2 on the right side, and 1 on the left. Patient also has paracentesis drain for decompression of the abdomen.    Patient has since had chest tubes and peritoneal tubes removed.  Hospital course complicated by shock liver which has since resolved. Patient is now s/p trach and PEG.  Patient continues to require HD.  Pt has willard to monitor urine output      REVIEW OF SYSTEMS  Constitutional - No fever, No weight loss, No fatigue  HEENT - No eye pain, No visual disturbances, No difficulty hearing, No tinnitus, No vertigo, No neck pain  Respiratory - No cough, No wheezing, No shortness of breath  Cardiovascular - No chest pain, No palpitations  Gastrointestinal - No abdominal pain, No nausea, No vomiting, No diarrhea, No constipation  Genitourinary - No dysuria, No frequency, No hematuria, No incontinence  Neurological - No headaches, No memory loss, No loss of strength, No numbness, No tremors  Skin - No itching, No rashes, No lesions   Musculoskeletal - No joint pain, No joint swelling, No muscle pain  Psychiatric - No depression, No anxiety    PAST MEDICAL & SURGICAL HISTORY  Diabetes  Breast CA  H/O mastectomy, bilateral  No significant past surgical history      SOCIAL HISTORY  Smoking - Denied  EtOH - Denied   Drugs - Denied    FUNCTIONAL HISTORY  Lives with  in 2nd floor apartment  Independent for adls and ambulation at baseline    CURRENT FUNCTIONAL STATUS  last seen by OT on 2/9/18 at which time patient was max assist for bed mobility and dependent for transfers    FAMILY HISTORY   No pertinent family history in first degree relatives      RECENT LABS/IMAGING  CBC Full  -  ( 23 Mar 2018 06:23 )  WBC Count : 3.9 K/uL  Hemoglobin : 8.2 g/dL  Hematocrit : 26.4 %  Platelet Count - Automated : 365 K/uL  Mean Cell Volume : 94.5 fl  Mean Cell Hemoglobin : 29.2 pg  Mean Cell Hemoglobin Concentration : 30.9 gm/dL  Auto Neutrophil # : x  Auto Lymphocyte # : x  Auto Monocyte # : x  Auto Eosinophil # : x  Auto Basophil # : x  Auto Neutrophil % : x  Auto Lymphocyte % : x  Auto Monocyte % : x  Auto Eosinophil % : x  Auto Basophil % : x    03-23    135  |  95<L>  |  31<H>  ----------------------------<  223<H>  3.7   |  26  |  2.43<H>    Ca    9.4      23 Mar 2018 06:23  Phos  3.0     03-23  Mg     2.3     03-23          VITALS  T(C): 36.7 (03-23-18 @ 06:00), Max: 37.1 (03-22-18 @ 20:43)  HR: 95 (03-23-18 @ 06:15) (83 - 96)  BP: 144/78 (03-23-18 @ 06:00) (144/78 - 163/75)  RR: 22 (03-23-18 @ 09:30) (16 - 22)  SpO2: 98% (03-23-18 @ 09:30) (96% - 100%)  Wt(kg): --    ALLERGIES  doxycycline (Rash)  isoniazid (Rash)  NIFEdipine (Urticaria; Hives)  vitamin E (Short breath; Urticaria; Hives)      MEDICATIONS   acetaminophen    Suspension. 650 milliGRAM(s) Oral every 6 hours PRN  ALBUTerol/ipratropium for Nebulization 3 milliLiter(s) Nebulizer every 6 hours  aspirin  chewable 81 milliGRAM(s) Oral daily  calamine Lotion 1 Application(s) Topical daily  chlorhexidine 0.12% Liquid 15 milliLiter(s) Swish and Spit every 4 hours PRN  dextrose 5%. 1000 milliLiter(s) IV Continuous <Continuous>  dextrose 50% Injectable 12.5 Gram(s) IV Push once  dextrose 50% Injectable 25 Gram(s) IV Push once  epoetin odalis Injectable 80556 Unit(s) SubCutaneous <User Schedule>  glucagon  Injectable 1 milliGRAM(s) IntraMuscular once PRN  heparin  Injectable 5000 Unit(s) SubCutaneous every 8 hours  hydrocortisone 1% Ointment 1 Application(s) Topical every 8 hours PRN  insulin lispro (HumaLOG) corrective regimen sliding scale   SubCutaneous every 6 hours  insulin NPH human recombinant 10 Unit(s) SubCutaneous <User Schedule>  labetalol 100 milliGRAM(s) Oral two times a day  metoclopramide 5 milliGRAM(s) Oral every 8 hours  nystatin Powder 1 Application(s) Topical two times a day  nystatin/triamcinolone Ointment 1 Application(s) Topical three times a day  sodium chloride 3%  Inhalation 3 milliLiter(s) Inhalation four times a day      ----------------------------------------------------------------------------------------  PHYSICAL EXAM  Constitutional - NAD, Comfortable  HEENT - NCAT, EOMI  Neck - Supple, No limited ROM  Chest - CTA bilaterally, No wheeze, No rhonchi, No crackles  Cardiovascular - RRR, S1S2, No murmurs  Abdomen - BS+, Soft, NTND  Extremities - No C/C/E, No calf tenderness   Neurologic Exam -                    Cognitive - Awake, Alert, AAO to self, place, date, year, situation     Communication - Fluent, No dysarthria     Cranial Nerves - CN 2-12 intact     Motor - No focal deficits                    LEFT    UE - ShAB 5/5, EF 5/5, EE 5/5, WE 5/5,  5/5                    RIGHT UE - ShAB 5/5, EF 5/5, EE 5/5, WE 5/5,  5/5                    LEFT    LE - HF 5/5, KE 5/5, DF 5/5, PF 5/5                    RIGHT LE - HF 5/5, KE 5/5, DF 5/5, PF 5/5        Sensory - Intact to LT     Reflexes - DTR Intact, No primitive reflexive     Coordination - FTN intact     Balance - WNL Static  Psychiatric - Mood stable, Affect WNL  ----------------------------------------------------------------------------------------  ASSESSMENT/PLAN    Diet: npo with tube feed (gastrostomy tube)  DVT PPX - heparin sub q  Skin -turn and position q2  Rehab -    Recommend ACUTE inpatient rehabilitation for the functional deficits consisting of 3 hours of therapy/day & 24 hour RN/daily PMR physician for comorbid medical management. Will continue to follow for ongoing rehab needs and recommendations. chief complaint: weakness    HPI: 67y/o F hx Breast Cancer (diagnosed in Feb 2017) currently on Herceptin (last dose Jan 20th), HTN, Diabetes on 70/30, presented 1/30/18 with Fever and weakness.  In the ER, patient tested positive for flu. Patient then went into PEA arrest with chest compressions for 10min, received Rosc after Epi x 2, and was intubated. Patient required re-intubation (improperly intubated initially into the esophagus) after she PEA arrested again, with ROSC achieved after 30min with Epi x 5. Patient was also found to have bilateral pneumothoraces, s/p 3 chest tubes, 2 on the right side, and 1 on the left. Patient also has paracentesis drain for decompression of the abdomen.  Original PM&R consult done 2/12/18.   Patient has since had chest tubes and peritoneal tubes removed.  Hospital course complicated by shock liver which has since resolved. Patient is now s/p trach and PEG.  Patient continues to require HD.  Pt has willard to monitor urine output.      REVIEW OF SYSTEMS  Constitutional - No fever, No weight loss, No fatigue  HEENT - No eye pain, No visual disturbances, No difficulty hearing, No tinnitus, No vertigo, No neck pain  Respiratory - + trache  Cardiovascular - No chest pain, No palpitations  Gastrointestinal - + abdominal pain, + PEG, No nausea, No vomiting, No diarrhea, No constipation  Neurological - No headaches, No memory loss, No loss of strength, No numbness, No tremors  Skin - No itching, No rashes, No lesions   Musculoskeletal - weakness  Psychiatric - No depression, No anxiety    PAST MEDICAL & SURGICAL HISTORY  Diabetes  Breast CA  H/O mastectomy, bilateral      SOCIAL HISTORY  Smoking - Denied  EtOH - Denied   Drugs - Denied    FUNCTIONAL HISTORY  Lives with  in 2nd floor apartment  Independent for adls and ambulation at baseline    CURRENT FUNCTIONAL STATUS  last seen by OT on 2/9/18 at which time patient was max assist for bed mobility and dependent for transfers    FAMILY HISTORY   No pertinent family history in first degree relatives      RECENT LABS/IMAGING  CBC Full  -  ( 23 Mar 2018 06:23 )  WBC Count : 3.9 K/uL  Hemoglobin : 8.2 g/dL  Hematocrit : 26.4 %  Platelet Count - Automated : 365 K/uL  Mean Cell Volume : 94.5 fl  Mean Cell Hemoglobin : 29.2 pg  Mean Cell Hemoglobin Concentration : 30.9 gm/dL  Auto Neutrophil # : x  Auto Lymphocyte # : x  Auto Monocyte # : x  Auto Eosinophil # : x  Auto Basophil # : x  Auto Neutrophil % : x  Auto Lymphocyte % : x  Auto Monocyte % : x  Auto Eosinophil % : x  Auto Basophil % : x    03-23    135  |  95<L>  |  31<H>  ----------------------------<  223<H>  3.7   |  26  |  2.43<H>    Ca    9.4      23 Mar 2018 06:23  Phos  3.0     03-23  Mg     2.3     03-23          VITALS  T(C): 36.7 (03-23-18 @ 06:00), Max: 37.1 (03-22-18 @ 20:43)  HR: 95 (03-23-18 @ 06:15) (83 - 96)  BP: 144/78 (03-23-18 @ 06:00) (144/78 - 163/75)  RR: 22 (03-23-18 @ 09:30) (16 - 22)  SpO2: 98% (03-23-18 @ 09:30) (96% - 100%)    ALLERGIES  doxycycline (Rash)  isoniazid (Rash)  NIFEdipine (Urticaria; Hives)  vitamin E (Short breath; Urticaria; Hives)      MEDICATIONS   acetaminophen    Suspension. 650 milliGRAM(s) Oral every 6 hours PRN  ALBUTerol/ipratropium for Nebulization 3 milliLiter(s) Nebulizer every 6 hours  aspirin  chewable 81 milliGRAM(s) Oral daily  calamine Lotion 1 Application(s) Topical daily  chlorhexidine 0.12% Liquid 15 milliLiter(s) Swish and Spit every 4 hours PRN  dextrose 5%. 1000 milliLiter(s) IV Continuous <Continuous>  dextrose 50% Injectable 12.5 Gram(s) IV Push once  dextrose 50% Injectable 25 Gram(s) IV Push once  epoetin odalis Injectable 98752 Unit(s) SubCutaneous <User Schedule>  glucagon  Injectable 1 milliGRAM(s) IntraMuscular once PRN  heparin  Injectable 5000 Unit(s) SubCutaneous every 8 hours  hydrocortisone 1% Ointment 1 Application(s) Topical every 8 hours PRN  insulin lispro (HumaLOG) corrective regimen sliding scale   SubCutaneous every 6 hours  insulin NPH human recombinant 10 Unit(s) SubCutaneous <User Schedule>  labetalol 100 milliGRAM(s) Oral two times a day  metoclopramide 5 milliGRAM(s) Oral every 8 hours  nystatin Powder 1 Application(s) Topical two times a day  nystatin/triamcinolone Ointment 1 Application(s) Topical three times a day  sodium chloride 3%  Inhalation 3 milliLiter(s) Inhalation four times a day      ----------------------------------------------------------------------------------------  PHYSICAL EXAM  Constitutional - NAD, Comfortable  HEENT - + trache, NCAT, EOMI  Neck - Supple, No limited ROM  Chest - CTA bilaterally, No wheeze, No rhonchi, No crackles  Cardiovascular - RRR, S1S2, No murmurs  Abdomen - BS+, Soft, NTND  Extremities - No C/C/E, No calf tenderness   Neurologic Exam -                    Cognitive - Awake, Alert, AAO to self, place, date, year, situation     Communication - Fluent, No dysarthria     Cranial Nerves - CN 2-12 intact     Motor -                     LEFT    UE - ShAB 4/5, EF 4/5, EE 4/5, WE 4/5,  4/5                    RIGHT UE - ShAB 4/5, EF 4/5, EE 4/5, WE 4/5,  4/5                    LEFT    LE - HF 3/5, KE 3/5, DF 3/5, PF 3/5                    RIGHT LE - HF 2/5, KE 2/5, DF 0/5, PF 0/5        Sensory - Intact to LT     Reflexes - DTR Intact, No primitive reflexive     Balance - WNL Static  Psychiatric - Mood stable, Affect WNL  ----------------------------------------------------------------------------------------  ASSESSMENT/PLAN 69 yo f originally admitted for influenza, hospital course complicated by cardiac arrest, now with weakness and difficulty with gait    Diet: npo with tube feed (gastrostomy tube)  DVT PPX - heparin sub q  Skin -turn and position q2  Rehab -    Recommend ACUTE inpatient rehabilitation for the functional deficits consisting of 3 hours of therapy/day & 24 hour RN/daily PMR physician for comorbid medical management. Will continue to follow for ongoing rehab needs and recommendations. chief complaint: weakness    HPI: 69y/o F hx Breast Cancer (diagnosed in Feb 2017) currently on Herceptin (last dose Jan 20th), HTN, Diabetes on 70/30, presented 1/30/18 with Fever and weakness.  In the ER, patient tested positive for flu. Patient then went into PEA arrest with chest compressions for 10min, received Rosc after Epi x 2, and was intubated. Patient required re-intubation (improperly intubated initially into the esophagus) after she PEA arrested again, with ROSC achieved after 30min with Epi x 5. Patient was also found to have bilateral pneumothoraces, s/p 3 chest tubes, 2 on the right side, and 1 on the left. Patient also has paracentesis drain for decompression of the abdomen.  Original PM&R consult done 2/12/18.   Patient has since had chest tubes and peritoneal tubes removed.  Hospital course complicated by shock liver which has since resolved. Patient is now s/p trach and PEG.  Patient continues to require HD.  Pt has willard to monitor urine output.      REVIEW OF SYSTEMS  Constitutional - No fever, No weight loss, No fatigue  HEENT - No eye pain, No visual disturbances, No difficulty hearing, No tinnitus, No vertigo, No neck pain  Respiratory - + trache  Cardiovascular - No chest pain, No palpitations  Gastrointestinal - + abdominal pain, + PEG, No nausea, No vomiting, No diarrhea, No constipation  Neurological - No headaches, No memory loss, No loss of strength, No numbness, No tremors  Skin - No itching, No rashes, No lesions   Musculoskeletal - weakness  Psychiatric - No depression, No anxiety    PAST MEDICAL & SURGICAL HISTORY  Diabetes  Breast CA  H/O mastectomy, bilateral      SOCIAL HISTORY  Smoking - Denied  EtOH - Denied   Drugs - Denied    FUNCTIONAL HISTORY  Lives with  in 2nd floor apartment  Independent for adls and ambulation at baseline    CURRENT FUNCTIONAL STATUS  last seen by OT on 2/9/18 at which time patient was max assist for bed mobility and dependent for transfers    FAMILY HISTORY   No pertinent family history in first degree relatives      RECENT LABS/IMAGING  CBC Full  -  ( 23 Mar 2018 06:23 )  WBC Count : 3.9 K/uL  Hemoglobin : 8.2 g/dL  Hematocrit : 26.4 %  Platelet Count - Automated : 365 K/uL  Mean Cell Volume : 94.5 fl  Mean Cell Hemoglobin : 29.2 pg  Mean Cell Hemoglobin Concentration : 30.9 gm/dL  Auto Neutrophil # : x  Auto Lymphocyte # : x  Auto Monocyte # : x  Auto Eosinophil # : x  Auto Basophil # : x  Auto Neutrophil % : x  Auto Lymphocyte % : x  Auto Monocyte % : x  Auto Eosinophil % : x  Auto Basophil % : x    03-23    135  |  95<L>  |  31<H>  ----------------------------<  223<H>  3.7   |  26  |  2.43<H>    Ca    9.4      23 Mar 2018 06:23  Phos  3.0     03-23  Mg     2.3     03-23          VITALS  T(C): 36.7 (03-23-18 @ 06:00), Max: 37.1 (03-22-18 @ 20:43)  HR: 95 (03-23-18 @ 06:15) (83 - 96)  BP: 144/78 (03-23-18 @ 06:00) (144/78 - 163/75)  RR: 22 (03-23-18 @ 09:30) (16 - 22)  SpO2: 98% (03-23-18 @ 09:30) (96% - 100%)    ALLERGIES  doxycycline (Rash)  isoniazid (Rash)  NIFEdipine (Urticaria; Hives)  vitamin E (Short breath; Urticaria; Hives)      MEDICATIONS   acetaminophen    Suspension. 650 milliGRAM(s) Oral every 6 hours PRN  ALBUTerol/ipratropium for Nebulization 3 milliLiter(s) Nebulizer every 6 hours  aspirin  chewable 81 milliGRAM(s) Oral daily  calamine Lotion 1 Application(s) Topical daily  chlorhexidine 0.12% Liquid 15 milliLiter(s) Swish and Spit every 4 hours PRN  dextrose 5%. 1000 milliLiter(s) IV Continuous <Continuous>  dextrose 50% Injectable 12.5 Gram(s) IV Push once  dextrose 50% Injectable 25 Gram(s) IV Push once  epoetin odalis Injectable 89390 Unit(s) SubCutaneous <User Schedule>  glucagon  Injectable 1 milliGRAM(s) IntraMuscular once PRN  heparin  Injectable 5000 Unit(s) SubCutaneous every 8 hours  hydrocortisone 1% Ointment 1 Application(s) Topical every 8 hours PRN  insulin lispro (HumaLOG) corrective regimen sliding scale   SubCutaneous every 6 hours  insulin NPH human recombinant 10 Unit(s) SubCutaneous <User Schedule>  labetalol 100 milliGRAM(s) Oral two times a day  metoclopramide 5 milliGRAM(s) Oral every 8 hours  nystatin Powder 1 Application(s) Topical two times a day  nystatin/triamcinolone Ointment 1 Application(s) Topical three times a day  sodium chloride 3%  Inhalation 3 milliLiter(s) Inhalation four times a day      ----------------------------------------------------------------------------------------  PHYSICAL EXAM  Constitutional - NAD, Comfortable  HEENT - + trache, NCAT, EOMI  Neck - Supple, No limited ROM  Chest - CTA bilaterally, No wheeze, No rhonchi, No crackles  Cardiovascular - RRR, S1S2, No murmurs  Abdomen - + PEG, Soft, + left sided tenderness  Extremities - No C/C/E, No calf tenderness   Neurologic Exam -                    Cognitive - Awake, Alert, AAO 2-3, oriented to self, place, date but not year     Communication - Fluent, No dysarthria     Cranial Nerves - CN 2-12 intact     Motor -                     LEFT    UE - ShAB 4/5, EF 4/5, EE 4/5, WE 4/5,  4/5                    RIGHT UE - ShAB 4/5, EF 4/5, EE 4/5, WE 4/5,  4/5                    LEFT    LE - HF 3/5, KE 3/5, DF 3/5, PF 3/5                    RIGHT LE - HF 2/5, KE 2/5, DF 0/5, PF 0/5        Sensory - Intact to LT     Reflexes - DTR Intact, No primitive reflexive     Balance - WNL Static  Psychiatric - Mood stable, Affect WNL  ----------------------------------------------------------------------------------------  ASSESSMENT/PLAN 67 yo f originally admitted for influenza, hospital course complicated by cardiac arrest, now with weakness and difficulty with gait    Diet: npo with tube feed (gastrostomy tube)  DVT PPX - heparin sub q  Skin -turn and position q2  Rehab -    Recommend ACUTE inpatient rehabilitation for the functional deficits consisting of 3 hours of therapy/day & 24 hour RN/daily PMR physician for comorbid medical management. Will continue to follow for ongoing rehab needs and recommendations.

## 2018-03-23 NOTE — PROGRESS NOTE ADULT - PROBLEM SELECTOR PLAN 1
-test BG Q6h  -Increase NPH to 12 units QID (6am, 12pm, 6pm-12mn)  -Hold NPH if tube feeds off or BG less than 100mg/dl  -c/w Humalog moderate correction scale Q6h  -Plan discussed with pt/team.  Contact info: [phone] (24/7). pager [phone]

## 2018-03-24 LAB
ANION GAP SERPL CALC-SCNC: 12 MMOL/L — SIGNIFICANT CHANGE UP (ref 5–17)
BUN SERPL-MCNC: 42 MG/DL — HIGH (ref 7–23)
CALCIUM SERPL-MCNC: 10.1 MG/DL — SIGNIFICANT CHANGE UP (ref 8.4–10.5)
CHLORIDE SERPL-SCNC: 95 MMOL/L — LOW (ref 96–108)
CO2 SERPL-SCNC: 30 MMOL/L — SIGNIFICANT CHANGE UP (ref 22–31)
CREAT SERPL-MCNC: 2.93 MG/DL — HIGH (ref 0.5–1.3)
GLUCOSE BLDC GLUCOMTR-MCNC: 123 MG/DL — HIGH (ref 70–99)
GLUCOSE BLDC GLUCOMTR-MCNC: 212 MG/DL — HIGH (ref 70–99)
GLUCOSE BLDC GLUCOMTR-MCNC: 230 MG/DL — HIGH (ref 70–99)
GLUCOSE SERPL-MCNC: 226 MG/DL — HIGH (ref 70–99)
HCT VFR BLD CALC: 25.1 % — LOW (ref 34.5–45)
HGB BLD-MCNC: 7.9 G/DL — LOW (ref 11.5–15.5)
MAGNESIUM SERPL-MCNC: 2.4 MG/DL — SIGNIFICANT CHANGE UP (ref 1.6–2.6)
MCHC RBC-ENTMCNC: 29.4 PG — SIGNIFICANT CHANGE UP (ref 27–34)
MCHC RBC-ENTMCNC: 31.4 GM/DL — LOW (ref 32–36)
MCV RBC AUTO: 93.8 FL — SIGNIFICANT CHANGE UP (ref 80–100)
PHOSPHATE SERPL-MCNC: 4 MG/DL — SIGNIFICANT CHANGE UP (ref 2.5–4.5)
PLATELET # BLD AUTO: 369 K/UL — SIGNIFICANT CHANGE UP (ref 150–400)
POTASSIUM SERPL-MCNC: 3.5 MMOL/L — SIGNIFICANT CHANGE UP (ref 3.5–5.3)
POTASSIUM SERPL-SCNC: 3.5 MMOL/L — SIGNIFICANT CHANGE UP (ref 3.5–5.3)
RBC # BLD: 2.68 M/UL — LOW (ref 3.8–5.2)
RBC # FLD: 15.8 % — HIGH (ref 10.3–14.5)
SODIUM SERPL-SCNC: 137 MMOL/L — SIGNIFICANT CHANGE UP (ref 135–145)
WBC # BLD: 2.9 K/UL — LOW (ref 3.8–10.5)
WBC # FLD AUTO: 2.9 K/UL — LOW (ref 3.8–10.5)

## 2018-03-24 PROCEDURE — 99233 SBSQ HOSP IP/OBS HIGH 50: CPT

## 2018-03-24 PROCEDURE — 99233 SBSQ HOSP IP/OBS HIGH 50: CPT | Mod: GC

## 2018-03-24 RX ORDER — METOCLOPRAMIDE HCL 10 MG
10 TABLET ORAL EVERY 8 HOURS
Qty: 0 | Refills: 0 | Status: DISCONTINUED | OUTPATIENT
Start: 2018-03-24 | End: 2018-03-30

## 2018-03-24 RX ORDER — SODIUM CHLORIDE 9 MG/ML
500 INJECTION INTRAMUSCULAR; INTRAVENOUS; SUBCUTANEOUS
Qty: 0 | Refills: 0 | Status: COMPLETED | OUTPATIENT
Start: 2018-03-24 | End: 2018-03-24

## 2018-03-24 RX ADMIN — HEPARIN SODIUM 5000 UNIT(S): 5000 INJECTION INTRAVENOUS; SUBCUTANEOUS at 13:15

## 2018-03-24 RX ADMIN — NYSTATIN CREAM 1 APPLICATION(S): 100000 CREAM TOPICAL at 06:00

## 2018-03-24 RX ADMIN — Medication 10 MILLIGRAM(S): at 13:15

## 2018-03-24 RX ADMIN — HEPARIN SODIUM 5000 UNIT(S): 5000 INJECTION INTRAVENOUS; SUBCUTANEOUS at 22:48

## 2018-03-24 RX ADMIN — Medication 4: at 06:23

## 2018-03-24 RX ADMIN — HUMAN INSULIN 12 UNIT(S): 100 INJECTION, SUSPENSION SUBCUTANEOUS at 17:55

## 2018-03-24 RX ADMIN — Medication 4: at 17:55

## 2018-03-24 RX ADMIN — Medication 100 MILLIGRAM(S): at 20:40

## 2018-03-24 RX ADMIN — Medication 3 MILLILITER(S): at 23:27

## 2018-03-24 RX ADMIN — CALAMINE 8% AND ZINC OXIDE 8% 1 APPLICATION(S): 160 LOTION TOPICAL at 11:15

## 2018-03-24 RX ADMIN — Medication 1 APPLICATION(S): at 13:15

## 2018-03-24 RX ADMIN — Medication 100 MILLIGRAM(S): at 07:50

## 2018-03-24 RX ADMIN — Medication 3 MILLILITER(S): at 05:43

## 2018-03-24 RX ADMIN — SODIUM CHLORIDE 3 MILLILITER(S): 9 INJECTION INTRAMUSCULAR; INTRAVENOUS; SUBCUTANEOUS at 11:56

## 2018-03-24 RX ADMIN — Medication 3 MILLILITER(S): at 11:56

## 2018-03-24 RX ADMIN — HUMAN INSULIN 12 UNIT(S): 100 INJECTION, SUSPENSION SUBCUTANEOUS at 05:58

## 2018-03-24 RX ADMIN — SODIUM CHLORIDE 3 MILLILITER(S): 9 INJECTION INTRAMUSCULAR; INTRAVENOUS; SUBCUTANEOUS at 17:26

## 2018-03-24 RX ADMIN — Medication 81 MILLIGRAM(S): at 11:15

## 2018-03-24 RX ADMIN — SODIUM CHLORIDE 3 MILLILITER(S): 9 INJECTION INTRAMUSCULAR; INTRAVENOUS; SUBCUTANEOUS at 23:28

## 2018-03-24 RX ADMIN — HEPARIN SODIUM 5000 UNIT(S): 5000 INJECTION INTRAVENOUS; SUBCUTANEOUS at 05:58

## 2018-03-24 RX ADMIN — HUMAN INSULIN 12 UNIT(S): 100 INJECTION, SUSPENSION SUBCUTANEOUS at 11:54

## 2018-03-24 RX ADMIN — Medication 1 APPLICATION(S): at 05:59

## 2018-03-24 RX ADMIN — SODIUM CHLORIDE 125 MILLILITER(S): 9 INJECTION INTRAMUSCULAR; INTRAVENOUS; SUBCUTANEOUS at 15:31

## 2018-03-24 RX ADMIN — Medication 1 APPLICATION(S): at 22:48

## 2018-03-24 RX ADMIN — Medication 5 MILLIGRAM(S): at 05:58

## 2018-03-24 RX ADMIN — SODIUM CHLORIDE 3 MILLILITER(S): 9 INJECTION INTRAMUSCULAR; INTRAVENOUS; SUBCUTANEOUS at 05:43

## 2018-03-24 RX ADMIN — NYSTATIN CREAM 1 APPLICATION(S): 100000 CREAM TOPICAL at 16:37

## 2018-03-24 RX ADMIN — Medication 3 MILLILITER(S): at 17:25

## 2018-03-24 RX ADMIN — Medication 10 MILLIGRAM(S): at 22:49

## 2018-03-24 NOTE — PROGRESS NOTE ADULT - ASSESSMENT
67yoF with breast cancer, HTN who p/w flu, went into respiratory arrest, then PEA arrest, s/p chest tube, TPA and intubation. Course c/b shock liver, RUSS, pneumoperitoneum, and elevated cardiac enzymes. Chest tubes and peritoneal tubes removed, and shock liver now resolved. Developed Staph aureus bacteremia. Now s/p trach and PEG. Pt was requiring HD.

## 2018-03-24 NOTE — PROGRESS NOTE ADULT - PROBLEM SELECTOR PLAN 1
from [facility] in setting of cardiac arrest. Patient s/p tunneled HD catheter placement by IR. Pt oliguric; made only 150cc over about 12 hrs. Electrolytes acceptable. Pt does not appear fluid overloaded. C/w Wheeler for now. Will monitor off HD today and reassess daily. BMP daily. Monitor UO with Wheeler.

## 2018-03-24 NOTE — PROGRESS NOTE ADULT - SUBJECTIVE AND OBJECTIVE BOX
[facility] DIVISION OF KIDNEY DISEASES AND HYPERTENSION -- FOLLOW UP NOTE  --------------------------------------------------------------------------------  Chief Complaint:  renal failure    24 hour events/subjective:  Pt seen in RCU. Daughter at bedside. Pt resting comfortably. On trach collar. Wheeler in place.       PAST HISTORY  --------------------------------------------------------------------------------  No significant changes to PMH, PSH, FHx, SHx, unless otherwise noted    ALLERGIES & MEDICATIONS  --------------------------------------------------------------------------------  Allergies    doxycycline (Rash)  isoniazid (Rash)  NIFEdipine (Urticaria; Hives)  vitamin E (Short breath; Urticaria; Hives)    Intolerances      Standing Inpatient Medications  ALBUTerol/ipratropium for Nebulization 3 milliLiter(s) Nebulizer every 6 hours  aspirin  chewable 81 milliGRAM(s) Oral daily  calamine Lotion 1 Application(s) Topical daily  dextrose 5%. 1000 milliLiter(s) IV Continuous <Continuous>  dextrose 50% Injectable 12.5 Gram(s) IV Push once  dextrose 50% Injectable 25 Gram(s) IV Push once  epoetin odalis Injectable 85199 Unit(s) SubCutaneous <User Schedule>  heparin  Injectable 5000 Unit(s) SubCutaneous every 8 hours  insulin lispro (HumaLOG) corrective regimen sliding scale   SubCutaneous every 6 hours  insulin NPH human recombinant 12 Unit(s) SubCutaneous every 6 hours  labetalol 100 milliGRAM(s) Oral two times a day  metoclopramide 10 milliGRAM(s) Oral every 8 hours  nystatin Powder 1 Application(s) Topical two times a day  nystatin/triamcinolone Ointment 1 Application(s) Topical three times a day  sodium chloride 3%  Inhalation 3 milliLiter(s) Inhalation four times a day    PRN Inpatient Medications  acetaminophen    Suspension. 650 milliGRAM(s) Oral every 6 hours PRN  chlorhexidine 0.12% Liquid 15 milliLiter(s) Swish and Spit every 4 hours PRN  glucagon  Injectable 1 milliGRAM(s) IntraMuscular once PRN  hydrocortisone 1% Ointment 1 Application(s) Topical every 8 hours PRN      REVIEW OF SYSTEMS  --------------------------------------------------------------------------------  Gen: no pain    VITALS/PHYSICAL EXAM  --------------------------------------------------------------------------------  T(C): 36.6 (03-24-18 @ 04:39), Max: 36.8 (03-23-18 @ 13:37)  HR: 98 (03-24-18 @ 08:17) (80 - 104)  BP: 136/76 (03-24-18 @ 04:39) (136/76 - 170/72)  RR: 16 (03-24-18 @ 08:17) (14 - 20)  SpO2: 98% (03-24-18 @ 08:17) (95% - 100%)  Wt(kg): --        03-23-18 @ 07:01  -  03-24-18 @ 07:00  --------------------------------------------------------  IN: 730 mL / OUT: 150 mL / NET: 580 mL      Physical Exam:  	Gen: NAD, well-appearing  	HEENT: trach collar  	Pulm: CTA B/L  	CV: RRR, S1S2; no rub  	Abd: +BS, soft, +PEG  	LE: Warm, no edema  	Neuro: follows simple commands  	Skin: Warm, without rashes  	Vascular access: left IJ tunneled HD catheter - no drainage or erythema    LABS/STUDIES  --------------------------------------------------------------------------------              7.9    2.9   >-----------<  369      [03-24-18 @ 06:49]              25.1     137  |  95  |  42  ----------------------------<  226      [03-24-18 @ 06:49]  3.5   |  30  |  2.93        Ca     10.1     [03-24-18 @ 06:49]      Mg     2.4     [03-24-18 @ 06:49]      Phos  4.0     [03-24-18 @ 06:49]            Creatinine Trend:  SCr 2.93 [03-24 @ 06:49]  SCr 2.43 [03-23 @ 06:23]  SCr 1.66 [03-22 @ 06:26]  SCr 2.54 [03-21 @ 07:09]  SCr 1.72 [03-20 @ 07:13]        Iron 32, TIBC 248, %sat 13      [03-07-18 @ 14:34]  Ferritin 174      [03-07-18 @ 14:34]  HbA1c 8.6      [01-31-18 @ 07:15]  Lipid: chol 183, , HDL 10,       [02-07-18 @ 13:34]    HBsAb <3.0      [02-28-18 @ 20:23]  HBsAb Nonreact      [03-23-18 @ 07:58]  HBsAg Nonreact      [03-23-18 @ 07:58]  HBcAb Nonreact      [03-23-18 @ 07:58]  HCV 0.24, Nonreact      [03-23-18 @ 07:58]

## 2018-03-24 NOTE — PROGRESS NOTE ADULT - SUBJECTIVE AND OBJECTIVE BOX
Patient is a 68y old  Female who presents with a chief complaint of Fever, total body weakness (02 Feb 2018 13:44)      Interval Events:    REVIEW OF SYSTEMS:  [ ] Positive  [ ] All other systems negative  [ ] Unable to assess ROS because ________    Vital Signs Last 24 Hrs  T(C): 36.6 (03-24-18 @ 04:39), Max: 36.8 (03-23-18 @ 13:37)  T(F): 97.8 (03-24-18 @ 04:39), Max: 98.3 (03-23-18 @ 13:37)  HR: 80 (03-24-18 @ 05:44) (80 - 104)  BP: 136/76 (03-24-18 @ 04:39) (136/76 - 170/72)  RR: 20 (03-24-18 @ 04:39) (14 - 22)  SpO2: 99% (03-24-18 @ 05:28) (95% - 100%)    PHYSICAL EXAM:  HEENT:   [ ]Tracheostomy:  [ ]Pupils equal  [ ]No oral lesions  [ ]Abnormal    SKIN  [ ]No Rash  [ ] Abnormal  [ ] pressure    CARDIAC  [ ]Regular  [ ]Abnormal    PULMONARY  [ ]Bilateral Clear Breath Sounds  [ ]Normal Excursion  [ ]Abnormal    GI  [ ]PEG      [ ] +BS		              [ ]Soft, nondistended, nontender	  [ ]Abnormal    MUSCULOSKELETAL                                   [ ]Bedbound                 [ ]Abnormal    [ ]Ambulatory/OOB to chair                           EXTREMITIES                                         [ ]Normal  [ ]Edema                           NEUROLOGIC  [ ] Normal, non focal  [ ] Focal findings:    PSYCHIATRIC  [ ]Alert and appropriate  [ ] Sedated	 [ ]Agitated    :  Wheeler: [ ] Yes, if yes: Date of Placement:                   [  ] No    LINES: Central Lines [ ] Yes, if yes: Date of Placement                                     [  ] No    HOSPITAL MEDICATIONS:  MEDICATIONS  (STANDING):  ALBUTerol/ipratropium for Nebulization 3 milliLiter(s) Nebulizer every 6 hours  aspirin  chewable 81 milliGRAM(s) Oral daily  calamine Lotion 1 Application(s) Topical daily  dextrose 5%. 1000 milliLiter(s) (50 mL/Hr) IV Continuous <Continuous>  dextrose 50% Injectable 12.5 Gram(s) IV Push once  dextrose 50% Injectable 25 Gram(s) IV Push once  epoetin odalis Injectable 12110 Unit(s) SubCutaneous <User Schedule>  heparin  Injectable 5000 Unit(s) SubCutaneous every 8 hours  insulin lispro (HumaLOG) corrective regimen sliding scale   SubCutaneous every 6 hours  insulin NPH human recombinant 12 Unit(s) SubCutaneous every 6 hours  labetalol 100 milliGRAM(s) Oral two times a day  metoclopramide 5 milliGRAM(s) Oral every 8 hours  nystatin Powder 1 Application(s) Topical two times a day  nystatin/triamcinolone Ointment 1 Application(s) Topical three times a day  sodium chloride 3%  Inhalation 3 milliLiter(s) Inhalation four times a day    MEDICATIONS  (PRN):  acetaminophen    Suspension. 650 milliGRAM(s) Oral every 6 hours PRN Mild Pain (1 - 3)  chlorhexidine 0.12% Liquid 15 milliLiter(s) Swish and Spit every 4 hours PRN mouth hygiene  glucagon  Injectable 1 milliGRAM(s) IntraMuscular once PRN Glucose LESS THAN 70 milligrams/deciliter  hydrocortisone 1% Ointment 1 Application(s) Topical every 8 hours PRN Rash and/or Itching      LABS:                        8.2    3.9   )-----------( 365      ( 23 Mar 2018 06:23 )             26.4     03-23    135  |  95<L>  |  31<H>  ----------------------------<  223<H>  3.7   |  26  |  2.43<H>    Ca    9.4      23 Mar 2018 06:23  Phos  3.0     03-23  Mg     2.3     03-23              CAPILLARY BLOOD GLUCOSE    MICROBIOLOGY:     RADIOLOGY:  [ ] Reviewed and interpreted by me Patient is a 68y old  Female who presents with a chief complaint of Fever, total body weakness (02 Feb 2018 13:44)      Interval Events:  no issues overnight    REVIEW OF SYSTEMS:  [ ] Positive  [x ] All other systems negative  [ ] Unable to assess ROS because ________    Vital Signs Last 24 Hrs  T(C): 36.6 (03-24-18 @ 04:39), Max: 36.8 (03-23-18 @ 13:37)  T(F): 97.8 (03-24-18 @ 04:39), Max: 98.3 (03-23-18 @ 13:37)  HR: 80 (03-24-18 @ 05:44) (80 - 104)  BP: 136/76 (03-24-18 @ 04:39) (136/76 - 170/72)  RR: 20 (03-24-18 @ 04:39) (14 - 22)  SpO2: 99% (03-24-18 @ 05:28) (95% - 100%)    PHYSICAL EXAM:  HEENT:   [x ]Tracheostomy:  6 shiley uncuffed  TC  30  ATC  [x ]Pupils equal  [ ]No oral lesions  [ ]Abnormal    SKIN  [x ]No Rash  [ ] Abnormal  [ ] pressure    CARDIAC  [x ]Regular  [ ]Abnormal    PULMONARY  [x ]Bilateral Clear Breath Sounds  [ ]Normal Excursion  [ ]Abnormal    GI  x[ ]PEG      [x ] +BS		              [x ]Soft, nondistended, nontender	  [ ]Abnormal    MUSCULOSKELETAL                                   [ ]Bedbound                 [ ]Abnormal    [x ]OOB to chair                           EXTREMITIES                                         [x ]Normal  [ ]Edema                           NEUROLOGIC  [x ] Normal, non focal  [ ] Focal findings:    PSYCHIATRIC  [ x]Alert and appropriate  [ ] Sedated	 [ ]Agitated    :  Wheeler: [x ] Yes, if yes: Date of Placement: 3/22                     [  ] No    LINES: Central Lines [x ] Yes, if yes: Date of Placement  permacath by IR 3/8  mediport 5/27/18                                     [  ] No    HOSPITAL MEDICATIONS:  MEDICATIONS  (STANDING):  ALBUTerol/ipratropium for Nebulization 3 milliLiter(s) Nebulizer every 6 hours  aspirin  chewable 81 milliGRAM(s) Oral daily  calamine Lotion 1 Application(s) Topical daily  dextrose 5%. 1000 milliLiter(s) (50 mL/Hr) IV Continuous <Continuous>  dextrose 50% Injectable 12.5 Gram(s) IV Push once  dextrose 50% Injectable 25 Gram(s) IV Push once  epoetin odalis Injectable 30469 Unit(s) SubCutaneous <User Schedule>  heparin  Injectable 5000 Unit(s) SubCutaneous every 8 hours  insulin lispro (HumaLOG) corrective regimen sliding scale   SubCutaneous every 6 hours  insulin NPH human recombinant 12 Unit(s) SubCutaneous every 6 hours  labetalol 100 milliGRAM(s) Oral two times a day  metoclopramide 5 milliGRAM(s) Oral every 8 hours  nystatin Powder 1 Application(s) Topical two times a day  nystatin/triamcinolone Ointment 1 Application(s) Topical three times a day  sodium chloride 3%  Inhalation 3 milliLiter(s) Inhalation four times a day    MEDICATIONS  (PRN):  acetaminophen    Suspension. 650 milliGRAM(s) Oral every 6 hours PRN Mild Pain (1 - 3)  chlorhexidine 0.12% Liquid 15 milliLiter(s) Swish and Spit every 4 hours PRN mouth hygiene  glucagon  Injectable 1 milliGRAM(s) IntraMuscular once PRN Glucose LESS THAN 70 milligrams/deciliter  hydrocortisone 1% Ointment 1 Application(s) Topical every 8 hours PRN Rash and/or Itching      LABS:                        8.2    3.9   )-----------( 365      ( 23 Mar 2018 06:23 )             26.4     03-23    135  |  95<L>  |  31<H>  ----------------------------<  223<H>  3.7   |  26  |  2.43<H>    Ca    9.4      23 Mar 2018 06:23  Phos  3.0     03-23  Mg     2.3     03-23              CAPILLARY BLOOD GLUCOSE    MICROBIOLOGY:     RADIOLOGY:  [ ] Reviewed and interpreted by me

## 2018-03-24 NOTE — PROGRESS NOTE ADULT - ATTENDING COMMENTS
68 yoF with breast cancer, HTN who p/w flu, went into respiratory arrest, then PEA arrest, s/p chest tube, TPA and intubation. Course c/b shock liver, RUSS, pneumoperitoneum, and elevated cardiac enzymes. Chest tubes and peritoneal tubes removed, and shock liver now resolved. Now with Staph aureus bacteremia. Now s/p PEG/trach.      RUSS: Likely ischemic ATN in the setting of shock/sepsis  BUN /Cr up, other electrolytes stable , with willard in place, only 75cc of UO  Consider 500cc of NS over next few hours to see if improves UO. CXR clear, FIo2 30% and no edema on exam.  Hold dialysis today, will reassess in am   Access: PC present    Anemia: DAVID with HD    Vol/HTN: BP stable .    Suzie Salas MD  Cell   Pager   Office

## 2018-03-24 NOTE — PROGRESS NOTE ADULT - PROBLEM SELECTOR PLAN 8
feed rate per nutritionist 40 cc /h. tolerating well feed rate per nutritionist 40 cc /h. tolerating well  nutrition re eval 3/23  continue current plan

## 2018-03-24 NOTE — PROGRESS NOTE ADULT - ATTENDING COMMENTS
Patient improved. Dialysis on hold, monitoring urine output and creatinine, which increased 0.5 since yesterday. On trach collar at 30%.

## 2018-03-24 NOTE — PROGRESS NOTE ADULT - PROBLEM SELECTOR PLAN 7
MSSA bacteremia s/p 5 days of vanc/ zosyn, 3 days of nafcillin, now on Ancef switched from nafcillin as patient may have allergic reaction to naf)- dose ancef with dialysis (total 4 weeks) until March 22   - echo shows no signs of vegetation MSSA bacteremia s/p 5 days of vanc/ zosyn, 3 days of nafcillin, now on Ancef switched from nafcillin as patient may have allergic reaction to naf)- dose ancef with dialysis (total 4 weeks) completed  March 22   - echo shows no signs of vegetation

## 2018-03-25 LAB
ANION GAP SERPL CALC-SCNC: 13 MMOL/L — SIGNIFICANT CHANGE UP (ref 5–17)
BUN SERPL-MCNC: 53 MG/DL — HIGH (ref 7–23)
CALCIUM SERPL-MCNC: 10.1 MG/DL — SIGNIFICANT CHANGE UP (ref 8.4–10.5)
CHLORIDE SERPL-SCNC: 97 MMOL/L — SIGNIFICANT CHANGE UP (ref 96–108)
CO2 SERPL-SCNC: 27 MMOL/L — SIGNIFICANT CHANGE UP (ref 22–31)
CREAT SERPL-MCNC: 3.06 MG/DL — HIGH (ref 0.5–1.3)
CULTURE RESULTS: SIGNIFICANT CHANGE UP
CULTURE RESULTS: SIGNIFICANT CHANGE UP
GLUCOSE BLDC GLUCOMTR-MCNC: 124 MG/DL — HIGH (ref 70–99)
GLUCOSE BLDC GLUCOMTR-MCNC: 144 MG/DL — HIGH (ref 70–99)
GLUCOSE BLDC GLUCOMTR-MCNC: 153 MG/DL — HIGH (ref 70–99)
GLUCOSE BLDC GLUCOMTR-MCNC: 166 MG/DL — HIGH (ref 70–99)
GLUCOSE BLDC GLUCOMTR-MCNC: 178 MG/DL — HIGH (ref 70–99)
GLUCOSE SERPL-MCNC: 129 MG/DL — HIGH (ref 70–99)
HCT VFR BLD CALC: 25.7 % — LOW (ref 34.5–45)
HGB BLD-MCNC: 8 G/DL — LOW (ref 11.5–15.5)
MAGNESIUM SERPL-MCNC: 2.5 MG/DL — SIGNIFICANT CHANGE UP (ref 1.6–2.6)
MCHC RBC-ENTMCNC: 29 PG — SIGNIFICANT CHANGE UP (ref 27–34)
MCHC RBC-ENTMCNC: 31 GM/DL — LOW (ref 32–36)
MCV RBC AUTO: 93.5 FL — SIGNIFICANT CHANGE UP (ref 80–100)
PHOSPHATE SERPL-MCNC: 4.2 MG/DL — SIGNIFICANT CHANGE UP (ref 2.5–4.5)
PLATELET # BLD AUTO: 380 K/UL — SIGNIFICANT CHANGE UP (ref 150–400)
POTASSIUM SERPL-MCNC: 3.5 MMOL/L — SIGNIFICANT CHANGE UP (ref 3.5–5.3)
POTASSIUM SERPL-SCNC: 3.5 MMOL/L — SIGNIFICANT CHANGE UP (ref 3.5–5.3)
RBC # BLD: 2.75 M/UL — LOW (ref 3.8–5.2)
RBC # FLD: 15.7 % — HIGH (ref 10.3–14.5)
SODIUM SERPL-SCNC: 137 MMOL/L — SIGNIFICANT CHANGE UP (ref 135–145)
SPECIMEN SOURCE: SIGNIFICANT CHANGE UP
SPECIMEN SOURCE: SIGNIFICANT CHANGE UP
WBC # BLD: 3.2 K/UL — LOW (ref 3.8–10.5)
WBC # FLD AUTO: 3.2 K/UL — LOW (ref 3.8–10.5)

## 2018-03-25 PROCEDURE — 99233 SBSQ HOSP IP/OBS HIGH 50: CPT | Mod: GC

## 2018-03-25 PROCEDURE — 99232 SBSQ HOSP IP/OBS MODERATE 35: CPT

## 2018-03-25 RX ORDER — SODIUM CHLORIDE 9 MG/ML
500 INJECTION INTRAMUSCULAR; INTRAVENOUS; SUBCUTANEOUS
Qty: 0 | Refills: 0 | Status: DISCONTINUED | OUTPATIENT
Start: 2018-03-25 | End: 2018-03-28

## 2018-03-25 RX ADMIN — SODIUM CHLORIDE 3 MILLILITER(S): 9 INJECTION INTRAMUSCULAR; INTRAVENOUS; SUBCUTANEOUS at 12:10

## 2018-03-25 RX ADMIN — Medication 2: at 11:45

## 2018-03-25 RX ADMIN — Medication 10 MILLIGRAM(S): at 06:17

## 2018-03-25 RX ADMIN — Medication 1 APPLICATION(S): at 21:43

## 2018-03-25 RX ADMIN — SODIUM CHLORIDE 125 MILLILITER(S): 9 INJECTION INTRAMUSCULAR; INTRAVENOUS; SUBCUTANEOUS at 11:02

## 2018-03-25 RX ADMIN — HUMAN INSULIN 12 UNIT(S): 100 INJECTION, SUSPENSION SUBCUTANEOUS at 00:57

## 2018-03-25 RX ADMIN — Medication 1 APPLICATION(S): at 14:33

## 2018-03-25 RX ADMIN — NYSTATIN CREAM 1 APPLICATION(S): 100000 CREAM TOPICAL at 18:04

## 2018-03-25 RX ADMIN — Medication 1 APPLICATION(S): at 06:16

## 2018-03-25 RX ADMIN — Medication 10 MILLIGRAM(S): at 14:33

## 2018-03-25 RX ADMIN — SODIUM CHLORIDE 3 MILLILITER(S): 9 INJECTION INTRAMUSCULAR; INTRAVENOUS; SUBCUTANEOUS at 23:07

## 2018-03-25 RX ADMIN — HEPARIN SODIUM 5000 UNIT(S): 5000 INJECTION INTRAVENOUS; SUBCUTANEOUS at 14:33

## 2018-03-25 RX ADMIN — NYSTATIN CREAM 1 APPLICATION(S): 100000 CREAM TOPICAL at 06:16

## 2018-03-25 RX ADMIN — Medication 100 MILLIGRAM(S): at 08:24

## 2018-03-25 RX ADMIN — HEPARIN SODIUM 5000 UNIT(S): 5000 INJECTION INTRAVENOUS; SUBCUTANEOUS at 06:16

## 2018-03-25 RX ADMIN — Medication 3 MILLILITER(S): at 12:10

## 2018-03-25 RX ADMIN — SODIUM CHLORIDE 3 MILLILITER(S): 9 INJECTION INTRAMUSCULAR; INTRAVENOUS; SUBCUTANEOUS at 05:44

## 2018-03-25 RX ADMIN — HUMAN INSULIN 12 UNIT(S): 100 INJECTION, SUSPENSION SUBCUTANEOUS at 11:44

## 2018-03-25 RX ADMIN — Medication 2: at 17:51

## 2018-03-25 RX ADMIN — SODIUM CHLORIDE 3 MILLILITER(S): 9 INJECTION INTRAMUSCULAR; INTRAVENOUS; SUBCUTANEOUS at 17:38

## 2018-03-25 RX ADMIN — Medication 3 MILLILITER(S): at 23:06

## 2018-03-25 RX ADMIN — SODIUM CHLORIDE 125 MILLILITER(S): 9 INJECTION INTRAMUSCULAR; INTRAVENOUS; SUBCUTANEOUS at 11:00

## 2018-03-25 RX ADMIN — Medication 3 MILLILITER(S): at 05:44

## 2018-03-25 RX ADMIN — HUMAN INSULIN 12 UNIT(S): 100 INJECTION, SUSPENSION SUBCUTANEOUS at 17:54

## 2018-03-25 RX ADMIN — HEPARIN SODIUM 5000 UNIT(S): 5000 INJECTION INTRAVENOUS; SUBCUTANEOUS at 21:42

## 2018-03-25 RX ADMIN — Medication 100 MILLIGRAM(S): at 20:42

## 2018-03-25 RX ADMIN — Medication 10 MILLIGRAM(S): at 21:42

## 2018-03-25 RX ADMIN — HUMAN INSULIN 12 UNIT(S): 100 INJECTION, SUSPENSION SUBCUTANEOUS at 06:20

## 2018-03-25 RX ADMIN — Medication 81 MILLIGRAM(S): at 11:01

## 2018-03-25 RX ADMIN — CALAMINE 8% AND ZINC OXIDE 8% 1 APPLICATION(S): 160 LOTION TOPICAL at 11:02

## 2018-03-25 RX ADMIN — SODIUM CHLORIDE 125 MILLILITER(S): 9 INJECTION INTRAMUSCULAR; INTRAVENOUS; SUBCUTANEOUS at 09:56

## 2018-03-25 RX ADMIN — Medication 1 APPLICATION(S): at 21:42

## 2018-03-25 RX ADMIN — Medication 3 MILLILITER(S): at 17:38

## 2018-03-25 NOTE — PROGRESS NOTE ADULT - SUBJECTIVE AND OBJECTIVE BOX
Diabetes Follow up note:  Interval Hx:  69 y/o F with T2DM uncontrolled with neuropathy and ESRD now on HD on insulin, osteoporosis, HTN, here with acute respiratory failure 2/2 influenza s/p CVA and PEA arrest x 2, and MSSA bacteremia on month long tx for aspiration PNA. BG values mostly at goal on present insulin regimen. Pt seen at bedside. Smiling today and in good spirits.     Review of Systems:  General: Denies pain.   GI: Tolerating TFs without any N/V/D/ABD PAIN.  CV: No CP/SOB  ENDO: No S&Sx of hypoglycemia  MEDS:    insulin lispro (HumaLOG) corrective regimen sliding scale   SubCutaneous every 6 hours  insulin NPH human recombinant 12 Unit(s) SubCutaneous every 6 hours      Allergies    doxycycline (Rash)  isoniazid (Rash)  NIFEdipine (Urticaria; Hives)  vitamin E (Short breath; Urticaria; Hives)        PE:  General: Female lying in bed. NAD.   Vital Signs Last 24 Hrs  T(C): 36.7 (25 Mar 2018 08:00), Max: 36.7 (25 Mar 2018 08:00)  T(F): 98.1 (25 Mar 2018 08:00), Max: 98.1 (25 Mar 2018 08:00)  HR: 92 (25 Mar 2018 08:00) (84 - 96)  BP: 139/76 (25 Mar 2018 08:00) (139/76 - 156/78)  BP(mean): --  RR: 16 (25 Mar 2018 09:00) (16 - 19)  SpO2: 100% (25 Mar 2018 09:00) (92% - 100%)  HEENT: Trach in place.   Abd: Soft, NT,ND, PEG in place.   Extremities: Warm. No edema. L foot w/brace.   Neuro: Awake. Following commands appropriately.     LABS:    POCT Blood Glucose.: 124 mg/dL (03-25-18 @ 05:38)  POCT Blood Glucose.: 144 mg/dL (03-25-18 @ 00:06)  POCT Blood Glucose.: 212 mg/dL (03-24-18 @ 17:51)  POCT Blood Glucose.: 123 mg/dL (03-24-18 @ 11:52)  POCT Blood Glucose.: 230 mg/dL (03-24-18 @ 06:16)  POCT Blood Glucose.: 169 mg/dL (03-23-18 @ 23:16)  POCT Blood Glucose.: 224 mg/dL (03-23-18 @ 17:58)  POCT Blood Glucose.: 232 mg/dL (03-23-18 @ 11:55)  POCT Blood Glucose.: 194 mg/dL (03-23-18 @ 05:21)  POCT Blood Glucose.: 241 mg/dL (03-22-18 @ 23:44)  POCT Blood Glucose.: 195 mg/dL (03-22-18 @ 17:38)  POCT Blood Glucose.: 248 mg/dL (03-22-18 @ 12:51)                            8.0    3.2   )-----------( 380      ( 25 Mar 2018 06:34 )             25.7       03-25    137  |  97  |  53<H>  ----------------------------<  129<H>  3.5   |  27  |  3.06<H>    Ca    10.1      25 Mar 2018 06:34  Phos  4.2     03-25  Mg     2.5     03-25        Hemoglobin A1C, Whole Blood: 8.6 % <H> [4.0 - 5.6] (01-31-18 @ 07:15)  Hemoglobin A1C, Whole Blood: 8.7 % <H> [4.0 - 5.6] (01-31-18 @ 05:51)            Contact number: marcelino [phone] or [phone]

## 2018-03-25 NOTE — PROGRESS NOTE ADULT - ASSESSMENT
67 y/o F with T2DM uncontrolled with neuropathy and ESRD now on HD on insulin, osteoporosis, HTN, here with acute respiratory failure 2/2 influenza s/p CVA and PEA arrest x 2, and MSSA bacteremia off abx. BG values mostly at goal on present insulin regimen. No pattern to make further changes at this time. BG goal (100-180mg/dl).

## 2018-03-25 NOTE — PROGRESS NOTE ADULT - ATTENDING COMMENTS
68 yoF with breast cancer, HTN who p/w flu, went into respiratory arrest, then PEA arrest, s/p chest tube, TPA and intubation. Course c/b shock liver, RUSS, pneumoperitoneum, and elevated cardiac enzymes. Chest tubes and peritoneal tubes removed, and shock liver now resolved. Now with Staph aureus bacteremia. Now s/p PEG/trach.      RUSS: Likely ischemic ATN in the setting of shock/sepsis  BUN /Cr up, other electrolytes stable , with willard in place, not much urine output last 48 hours and no improvement in UO with fluid challenge either.   Hold dialysis today, will reassess in am but likely will need HD on 3/26  Access: PC present  Anemia: DAVID with HD  Vol/HTN: BP stable .    Suzie Salas MD  Cell   Pager   Office

## 2018-03-25 NOTE — PROGRESS NOTE ADULT - SUBJECTIVE AND OBJECTIVE BOX
Patient is a 68y old  Female who presents with a chief complaint of Fever, total body weakness (02 Feb 2018 13:44)      Interval Events:    REVIEW OF SYSTEMS:  [ ] Positive  [ ] All other systems negative  [ ] Unable to assess ROS because ________    Vital Signs Last 24 Hrs  T(C): 36.5 (03-24-18 @ 20:43), Max: 36.5 (03-24-18 @ 20:43)  T(F): 97.7 (03-24-18 @ 20:43), Max: 97.7 (03-24-18 @ 20:43)  HR: 86 (03-25-18 @ 05:47) (84 - 98)  BP: 156/78 (03-24-18 @ 20:43) (152/68 - 156/78)  RR: 19 (03-25-18 @ 04:17) (16 - 19)  SpO2: 100% (03-25-18 @ 05:47) (92% - 100%)    PHYSICAL EXAM:  HEENT:   [ ]Tracheostomy:  [ ]Pupils equal  [ ]No oral lesions  [ ]Abnormal    SKIN  [ ]No Rash  [ ] Abnormal  [ ] pressure    CARDIAC  [ ]Regular  [ ]Abnormal    PULMONARY  [ ]Bilateral Clear Breath Sounds  [ ]Normal Excursion  [ ]Abnormal    GI  [ ]PEG      [ ] +BS		              [ ]Soft, nondistended, nontender	  [ ]Abnormal    MUSCULOSKELETAL                                   [ ]Bedbound                 [ ]Abnormal    [ ]Ambulatory/OOB to chair                           EXTREMITIES                                         [ ]Normal  [ ]Edema                           NEUROLOGIC  [ ] Normal, non focal  [ ] Focal findings:    PSYCHIATRIC  [ ]Alert and appropriate  [ ] Sedated	 [ ]Agitated    :  Wheeler: [ ] Yes, if yes: Date of Placement:                   [  ] No    LINES: Central Lines [ ] Yes, if yes: Date of Placement                                     [  ] No    HOSPITAL MEDICATIONS:  MEDICATIONS  (STANDING):  ALBUTerol/ipratropium for Nebulization 3 milliLiter(s) Nebulizer every 6 hours  aspirin  chewable 81 milliGRAM(s) Oral daily  calamine Lotion 1 Application(s) Topical daily  dextrose 5%. 1000 milliLiter(s) (50 mL/Hr) IV Continuous <Continuous>  dextrose 50% Injectable 12.5 Gram(s) IV Push once  dextrose 50% Injectable 25 Gram(s) IV Push once  epoetin odalis Injectable 91567 Unit(s) SubCutaneous <User Schedule>  heparin  Injectable 5000 Unit(s) SubCutaneous every 8 hours  insulin lispro (HumaLOG) corrective regimen sliding scale   SubCutaneous every 6 hours  insulin NPH human recombinant 12 Unit(s) SubCutaneous every 6 hours  labetalol 100 milliGRAM(s) Oral two times a day  metoclopramide 10 milliGRAM(s) Oral every 8 hours  nystatin Powder 1 Application(s) Topical two times a day  nystatin/triamcinolone Ointment 1 Application(s) Topical three times a day  sodium chloride 0.9%. 500 milliLiter(s) (125 mL/Hr) IV Continuous <Continuous>  sodium chloride 3%  Inhalation 3 milliLiter(s) Inhalation four times a day    MEDICATIONS  (PRN):  acetaminophen    Suspension. 650 milliGRAM(s) Oral every 6 hours PRN Mild Pain (1 - 3)  chlorhexidine 0.12% Liquid 15 milliLiter(s) Swish and Spit every 4 hours PRN mouth hygiene  glucagon  Injectable 1 milliGRAM(s) IntraMuscular once PRN Glucose LESS THAN 70 milligrams/deciliter  hydrocortisone 1% Ointment 1 Application(s) Topical every 8 hours PRN Rash and/or Itching      LABS:                        7.9    2.9   )-----------( 369      ( 24 Mar 2018 06:49 )             25.1     03-24    137  |  95<L>  |  42<H>  ----------------------------<  226<H>  3.5   |  30  |  2.93<H>    Ca    10.1      24 Mar 2018 06:49  Phos  4.0     03-24  Mg     2.4     03-24              CAPILLARY BLOOD GLUCOSE    MICROBIOLOGY:     RADIOLOGY:  [ ] Reviewed and interpreted by me Patient is a 68y old  Female who presents with a chief complaint of Fever, total body weakness (02 Feb 2018 13:44)      Interval Events:  no issues overnight    REVIEW OF SYSTEMS:  [ ] Positive  [x ] All other systems negative  [ ] Unable to assess ROS because ________    Vital Signs Last 24 Hrs  T(C): 36.5 (03-24-18 @ 20:43), Max: 36.5 (03-24-18 @ 20:43)  T(F): 97.7 (03-24-18 @ 20:43), Max: 97.7 (03-24-18 @ 20:43)  HR: 86 (03-25-18 @ 05:47) (84 - 98)  BP: 156/78 (03-24-18 @ 20:43) (152/68 - 156/78)  RR: 19 (03-25-18 @ 04:17) (16 - 19)  SpO2: 100% (03-25-18 @ 05:47) (92% - 100%)    PHYSICAL EXAM:  HEENT:   [x ]Tracheostomy: TC  30 ATC  [x ]Pupils equal  [ ]No oral lesions  [ ]Abnormal    SKIN  [x ]No Rash  [ ] Abnormal  [ ] pressure    CARDIAC  [x ]Regular  [ ]Abnormal    PULMONARY  [x ]Bilateral Clear Breath Sounds  [ ]Normal Excursion  [ ]Abnormal    GI  [x ]PEG      [x ] +BS		              [x ]Soft, nondistended, nontender	  [ ]Abnormal    MUSCULOSKELETAL                                   [x ]Bedbound                 [ ]Abnormal    [ ]Ambulatory/OOB to chair                           EXTREMITIES                                         [x ]Normal  [ ]Edema                           NEUROLOGIC  [x ] Normal, non focal  [ ] Focal findings:    PSYCHIATRIC  [ x]Alert and appropriate  [ ] Sedated	 [ ]Agitated    :  Wheeler: [x ] Yes, if yes: Date of Placement:  3/22 for accurate output per renal, fluids boluses                   [  ] No    LINES: Central Lines [x ] Yes, if yes: Date of Placement  mediport 5/27/18  permacath 3/8                                     [  ] No    HOSPITAL MEDICATIONS:  MEDICATIONS  (STANDING):  ALBUTerol/ipratropium for Nebulization 3 milliLiter(s) Nebulizer every 6 hours  aspirin  chewable 81 milliGRAM(s) Oral daily  calamine Lotion 1 Application(s) Topical daily  dextrose 5%. 1000 milliLiter(s) (50 mL/Hr) IV Continuous <Continuous>  dextrose 50% Injectable 12.5 Gram(s) IV Push once  dextrose 50% Injectable 25 Gram(s) IV Push once  epoetin odalis Injectable 56054 Unit(s) SubCutaneous <User Schedule>  heparin  Injectable 5000 Unit(s) SubCutaneous every 8 hours  insulin lispro (HumaLOG) corrective regimen sliding scale   SubCutaneous every 6 hours  insulin NPH human recombinant 12 Unit(s) SubCutaneous every 6 hours  labetalol 100 milliGRAM(s) Oral two times a day  metoclopramide 10 milliGRAM(s) Oral every 8 hours  nystatin Powder 1 Application(s) Topical two times a day  nystatin/triamcinolone Ointment 1 Application(s) Topical three times a day  sodium chloride 0.9%. 500 milliLiter(s) (125 mL/Hr) IV Continuous <Continuous>  sodium chloride 3%  Inhalation 3 milliLiter(s) Inhalation four times a day    MEDICATIONS  (PRN):  acetaminophen    Suspension. 650 milliGRAM(s) Oral every 6 hours PRN Mild Pain (1 - 3)  chlorhexidine 0.12% Liquid 15 milliLiter(s) Swish and Spit every 4 hours PRN mouth hygiene  glucagon  Injectable 1 milliGRAM(s) IntraMuscular once PRN Glucose LESS THAN 70 milligrams/deciliter  hydrocortisone 1% Ointment 1 Application(s) Topical every 8 hours PRN Rash and/or Itching      LABS:                        7.9    2.9   )-----------( 369      ( 24 Mar 2018 06:49 )             25.1     03-24    137  |  95<L>  |  42<H>  ----------------------------<  226<H>  3.5   |  30  |  2.93<H>    Ca    10.1      24 Mar 2018 06:49  Phos  4.0     03-24  Mg     2.4     03-24              CAPILLARY BLOOD GLUCOSE    MICROBIOLOGY:     RADIOLOGY:  [ ] Reviewed and interpreted by me

## 2018-03-25 NOTE — PROGRESS NOTE ADULT - PROBLEM SELECTOR PLAN 1
from [facility] in setting of cardiac arrest. Patient s/p tunneled HD catheter placement by IR. SCr stable at ~3 today. Pt oliguric; made 350cc after being given IVF. Can c/w IVF for today and monitor UO. Electrolytes acceptable. Pt does not appear fluid overloaded at this time. C/w Wheeler. Monitor off HD today and reassess tomorrow. BMP daily.

## 2018-03-25 NOTE — PROGRESS NOTE ADULT - PROBLEM SELECTOR PLAN 7
MSSA bacteremia s/p 5 days of vanc/ zosyn, 3 days of nafcillin, now on Ancef switched from nafcillin as patient may have allergic reaction to naf)- dose ancef with dialysis (total 4 weeks) completed  March 22   - echo shows no signs of vegetation

## 2018-03-25 NOTE — PROGRESS NOTE ADULT - PROBLEM SELECTOR PLAN 1
-test BG Q6h  -c/w NPH 12 units Q6h (hold if tube feeds held or BG less than 100mg/dl)  -c/w Humalog moderate correction scale Q6h  -will see as needed  -discussed w/RCU team  pager: [phone]

## 2018-03-25 NOTE — PROGRESS NOTE ADULT - PROBLEM SELECTOR PLAN 4
ESRD on HD  HD held 3/23  willard in place per nephrology x 48 hrs - eval output ESRD on HD  HD held 3/23  willard in place per nephrology x 48 hrs - eval output  375 cc 3/24-25  NS bolus 500 cc over 4 hrs 3/25 as renal note - follow output

## 2018-03-25 NOTE — PROGRESS NOTE ADULT - SUBJECTIVE AND OBJECTIVE BOX
[facility] DIVISION OF KIDNEY DISEASES AND HYPERTENSION -- FOLLOW UP NOTE  --------------------------------------------------------------------------------  Chief Complaint:  renal failure    24 hour events/subjective:  Pt resting comfortably. Daughter at bedside. Made 350cc UO over last shift.       PAST HISTORY  --------------------------------------------------------------------------------  No significant changes to PMH, PSH, FHx, SHx, unless otherwise noted    ALLERGIES & MEDICATIONS  --------------------------------------------------------------------------------  Allergies    doxycycline (Rash)  isoniazid (Rash)  NIFEdipine (Urticaria; Hives)  vitamin E (Short breath; Urticaria; Hives)    Intolerances      Standing Inpatient Medications  ALBUTerol/ipratropium for Nebulization 3 milliLiter(s) Nebulizer every 6 hours  aspirin  chewable 81 milliGRAM(s) Oral daily  calamine Lotion 1 Application(s) Topical daily  dextrose 5%. 1000 milliLiter(s) IV Continuous <Continuous>  dextrose 50% Injectable 12.5 Gram(s) IV Push once  dextrose 50% Injectable 25 Gram(s) IV Push once  epoetin odalis Injectable 77740 Unit(s) SubCutaneous <User Schedule>  heparin  Injectable 5000 Unit(s) SubCutaneous every 8 hours  insulin lispro (HumaLOG) corrective regimen sliding scale   SubCutaneous every 6 hours  insulin NPH human recombinant 12 Unit(s) SubCutaneous every 6 hours  labetalol 100 milliGRAM(s) Oral two times a day  metoclopramide 10 milliGRAM(s) Oral every 8 hours  nystatin Powder 1 Application(s) Topical two times a day  nystatin/triamcinolone Ointment 1 Application(s) Topical three times a day  sodium chloride 0.9%. 500 milliLiter(s) IV Continuous <Continuous>  sodium chloride 3%  Inhalation 3 milliLiter(s) Inhalation four times a day    PRN Inpatient Medications  acetaminophen    Suspension. 650 milliGRAM(s) Oral every 6 hours PRN  chlorhexidine 0.12% Liquid 15 milliLiter(s) Swish and Spit every 4 hours PRN  glucagon  Injectable 1 milliGRAM(s) IntraMuscular once PRN  hydrocortisone 1% Ointment 1 Application(s) Topical every 8 hours PRN      REVIEW OF SYSTEMS  --------------------------------------------------------------------------------  Gen: no pain    VITALS/PHYSICAL EXAM  --------------------------------------------------------------------------------  T(C): 36.7 (03-25-18 @ 08:00), Max: 36.7 (03-25-18 @ 08:00)  HR: 92 (03-25-18 @ 08:00) (84 - 96)  BP: 139/76 (03-25-18 @ 08:00) (139/76 - 156/78)  RR: 16 (03-25-18 @ 09:00) (16 - 19)  SpO2: 100% (03-25-18 @ 09:00) (92% - 100%)  Wt(kg): --        03-24-18 @ 07:01  -  03-25-18 @ 07:00  --------------------------------------------------------  IN: 865 mL / OUT: 375 mL / NET: 490 mL    03-25-18 @ 07:01  -  03-25-18 @ 10:33  --------------------------------------------------------  IN: 125 mL / OUT: 100 mL / NET: 25 mL      Physical Exam:  	Gen: NAD, well-appearing  	HEENT: trach collar  	Pulm: CTA B/L  	CV: RRR, S1S2; no rub  	Abd: +BS, soft, +PEG  	LE: Warm, no edema  	Skin: Warm, without rashes  	Vascular access: left IJ tunneled HD catheter - no drainage or erythema    LABS/STUDIES  --------------------------------------------------------------------------------              8.0    3.2   >-----------<  380      [03-25-18 @ 06:34]              25.7     137  |  97  |  53  ----------------------------<  129      [03-25-18 @ 06:34]  3.5   |  27  |  3.06        Ca     10.1     [03-25-18 @ 06:34]      Mg     2.5     [03-25-18 @ 06:34]      Phos  4.2     [03-25-18 @ 06:34]            Creatinine Trend:  SCr 3.06 [03-25 @ 06:34]  SCr 2.93 [03-24 @ 06:49]  SCr 2.43 [03-23 @ 06:23]  SCr 1.66 [03-22 @ 06:26]  SCr 2.54 [03-21 @ 07:09]        Iron 32, TIBC 248, %sat 13      [03-07-18 @ 14:34]  Ferritin 174      [03-07-18 @ 14:34]  HbA1c 8.6      [01-31-18 @ 07:15]  Lipid: chol 183, , HDL 10,       [02-07-18 @ 13:34]    HBsAb <3.0      [02-28-18 @ 20:23]  HBsAb Nonreact      [03-23-18 @ 07:58]  HBsAg Nonreact      [03-23-18 @ 07:58]  HBcAb Nonreact      [03-23-18 @ 07:58]  HCV 0.24, Nonreact      [03-23-18 @ 07:58]

## 2018-03-26 DIAGNOSIS — E83.52 HYPERCALCEMIA: ICD-10-CM

## 2018-03-26 LAB
ANION GAP SERPL CALC-SCNC: 15 MMOL/L — SIGNIFICANT CHANGE UP (ref 5–17)
BUN SERPL-MCNC: 58 MG/DL — HIGH (ref 7–23)
CALCIUM SERPL-MCNC: 10.7 MG/DL — HIGH (ref 8.4–10.5)
CHLORIDE SERPL-SCNC: 94 MMOL/L — LOW (ref 96–108)
CO2 SERPL-SCNC: 29 MMOL/L — SIGNIFICANT CHANGE UP (ref 22–31)
CREAT SERPL-MCNC: 3.02 MG/DL — HIGH (ref 0.5–1.3)
GLUCOSE BLDC GLUCOMTR-MCNC: 117 MG/DL — HIGH (ref 70–99)
GLUCOSE BLDC GLUCOMTR-MCNC: 133 MG/DL — HIGH (ref 70–99)
GLUCOSE BLDC GLUCOMTR-MCNC: 149 MG/DL — HIGH (ref 70–99)
GLUCOSE BLDC GLUCOMTR-MCNC: 152 MG/DL — HIGH (ref 70–99)
GLUCOSE BLDC GLUCOMTR-MCNC: 184 MG/DL — HIGH (ref 70–99)
GLUCOSE SERPL-MCNC: 140 MG/DL — HIGH (ref 70–99)
HCT VFR BLD CALC: 24.2 % — LOW (ref 34.5–45)
HGB BLD-MCNC: 7.7 G/DL — LOW (ref 11.5–15.5)
MAGNESIUM SERPL-MCNC: 2.6 MG/DL — SIGNIFICANT CHANGE UP (ref 1.6–2.6)
MCHC RBC-ENTMCNC: 29.3 PG — SIGNIFICANT CHANGE UP (ref 27–34)
MCHC RBC-ENTMCNC: 31.8 GM/DL — LOW (ref 32–36)
MCV RBC AUTO: 92.2 FL — SIGNIFICANT CHANGE UP (ref 80–100)
PHOSPHATE SERPL-MCNC: 4.9 MG/DL — HIGH (ref 2.5–4.5)
PLATELET # BLD AUTO: 349 K/UL — SIGNIFICANT CHANGE UP (ref 150–400)
POTASSIUM SERPL-MCNC: 3.6 MMOL/L — SIGNIFICANT CHANGE UP (ref 3.5–5.3)
POTASSIUM SERPL-SCNC: 3.6 MMOL/L — SIGNIFICANT CHANGE UP (ref 3.5–5.3)
RBC # BLD: 2.62 M/UL — LOW (ref 3.8–5.2)
RBC # FLD: 15.6 % — HIGH (ref 10.3–14.5)
SODIUM SERPL-SCNC: 138 MMOL/L — SIGNIFICANT CHANGE UP (ref 135–145)
WBC # BLD: 3 K/UL — LOW (ref 3.8–10.5)
WBC # FLD AUTO: 3 K/UL — LOW (ref 3.8–10.5)

## 2018-03-26 PROCEDURE — 99497 ADVNCD CARE PLAN 30 MIN: CPT | Mod: GC

## 2018-03-26 PROCEDURE — 99233 SBSQ HOSP IP/OBS HIGH 50: CPT | Mod: GC

## 2018-03-26 PROCEDURE — 99233 SBSQ HOSP IP/OBS HIGH 50: CPT | Mod: 25,GC

## 2018-03-26 PROCEDURE — 99232 SBSQ HOSP IP/OBS MODERATE 35: CPT

## 2018-03-26 RX ORDER — SODIUM CHLORIDE 9 MG/ML
500 INJECTION INTRAMUSCULAR; INTRAVENOUS; SUBCUTANEOUS
Qty: 0 | Refills: 0 | Status: DISCONTINUED | OUTPATIENT
Start: 2018-03-26 | End: 2018-03-28

## 2018-03-26 RX ADMIN — Medication 1 APPLICATION(S): at 06:48

## 2018-03-26 RX ADMIN — Medication 1 APPLICATION(S): at 13:06

## 2018-03-26 RX ADMIN — HEPARIN SODIUM 5000 UNIT(S): 5000 INJECTION INTRAVENOUS; SUBCUTANEOUS at 21:03

## 2018-03-26 RX ADMIN — SODIUM CHLORIDE 125 MILLILITER(S): 9 INJECTION INTRAMUSCULAR; INTRAVENOUS; SUBCUTANEOUS at 17:42

## 2018-03-26 RX ADMIN — SODIUM CHLORIDE 3 MILLILITER(S): 9 INJECTION INTRAMUSCULAR; INTRAVENOUS; SUBCUTANEOUS at 18:15

## 2018-03-26 RX ADMIN — Medication 2: at 06:53

## 2018-03-26 RX ADMIN — SODIUM CHLORIDE 3 MILLILITER(S): 9 INJECTION INTRAMUSCULAR; INTRAVENOUS; SUBCUTANEOUS at 11:40

## 2018-03-26 RX ADMIN — Medication 3 MILLILITER(S): at 23:48

## 2018-03-26 RX ADMIN — NYSTATIN CREAM 1 APPLICATION(S): 100000 CREAM TOPICAL at 17:42

## 2018-03-26 RX ADMIN — Medication 2: at 12:32

## 2018-03-26 RX ADMIN — CALAMINE 8% AND ZINC OXIDE 8% 1 APPLICATION(S): 160 LOTION TOPICAL at 12:32

## 2018-03-26 RX ADMIN — HUMAN INSULIN 12 UNIT(S): 100 INJECTION, SUSPENSION SUBCUTANEOUS at 17:47

## 2018-03-26 RX ADMIN — Medication 10 MILLIGRAM(S): at 21:03

## 2018-03-26 RX ADMIN — HUMAN INSULIN 12 UNIT(S): 100 INJECTION, SUSPENSION SUBCUTANEOUS at 00:08

## 2018-03-26 RX ADMIN — SODIUM CHLORIDE 3 MILLILITER(S): 9 INJECTION INTRAMUSCULAR; INTRAVENOUS; SUBCUTANEOUS at 23:48

## 2018-03-26 RX ADMIN — HUMAN INSULIN 12 UNIT(S): 100 INJECTION, SUSPENSION SUBCUTANEOUS at 06:54

## 2018-03-26 RX ADMIN — HEPARIN SODIUM 5000 UNIT(S): 5000 INJECTION INTRAVENOUS; SUBCUTANEOUS at 06:50

## 2018-03-26 RX ADMIN — Medication 3 MILLILITER(S): at 18:14

## 2018-03-26 RX ADMIN — Medication 2: at 00:08

## 2018-03-26 RX ADMIN — Medication 81 MILLIGRAM(S): at 12:32

## 2018-03-26 RX ADMIN — Medication 100 MILLIGRAM(S): at 21:03

## 2018-03-26 RX ADMIN — Medication 10 MILLIGRAM(S): at 06:49

## 2018-03-26 RX ADMIN — HEPARIN SODIUM 5000 UNIT(S): 5000 INJECTION INTRAVENOUS; SUBCUTANEOUS at 13:05

## 2018-03-26 RX ADMIN — Medication 1 APPLICATION(S): at 21:03

## 2018-03-26 RX ADMIN — NYSTATIN CREAM 1 APPLICATION(S): 100000 CREAM TOPICAL at 06:48

## 2018-03-26 RX ADMIN — HUMAN INSULIN 12 UNIT(S): 100 INJECTION, SUSPENSION SUBCUTANEOUS at 23:51

## 2018-03-26 RX ADMIN — Medication 100 MILLIGRAM(S): at 08:09

## 2018-03-26 RX ADMIN — Medication 10 MILLIGRAM(S): at 13:05

## 2018-03-26 RX ADMIN — Medication 3 MILLILITER(S): at 05:21

## 2018-03-26 RX ADMIN — Medication 3 MILLILITER(S): at 11:40

## 2018-03-26 RX ADMIN — SODIUM CHLORIDE 3 MILLILITER(S): 9 INJECTION INTRAMUSCULAR; INTRAVENOUS; SUBCUTANEOUS at 05:21

## 2018-03-26 RX ADMIN — HUMAN INSULIN 12 UNIT(S): 100 INJECTION, SUSPENSION SUBCUTANEOUS at 12:31

## 2018-03-26 NOTE — PROGRESS NOTE ADULT - PROBLEM SELECTOR PLAN 1
-test BG Q6h  -NPH 12 units sq q6 (hold if TF are held)  -continue moderate scale q6  -Patria Weiner MD  [phone]

## 2018-03-26 NOTE — PROGRESS NOTE ADULT - ATTENDING COMMENTS
Pt seen and examined, agree with above. 67 F with HTN, DM 2, obesity, breast CA now with prolonged hospital course due to acute resp failure with hypoxia 2/2 influenza and bacterial pneumonia, PEA arrest x 2, bilateral pneumothoraces, septic shock, pre-renal ATN requiring HD, CVA, PE s/p tPA. Now s/p trach/ PEG. Tolerating TC with PMV. Cont pulm toilet. Oropharyngeal dysphagia tolerating PEG feeds. Awaiting repeat swallow evalv. Currently being observed off HD. Bladder scan Q6H per renal recs.  updated at bedside. Advanced care planning time spent: 35 mins

## 2018-03-26 NOTE — PROGRESS NOTE ADULT - SUBJECTIVE AND OBJECTIVE BOX
[facility] DIVISION OF KIDNEY DISEASES AND HYPERTENSION -- FOLLOW UP NOTE  --------------------------------------------------------------------------------  Chief Complaint: RUSS on HD    24 hour events/subjective:  None  Made close to 1L  of urine overnight      PAST HISTORY  --------------------------------------------------------------------------------  No significant changes to PMH, PSH, FHx, SHx, unless otherwise noted    ALLERGIES & MEDICATIONS  --------------------------------------------------------------------------------  Allergies    doxycycline (Rash)  isoniazid (Rash)  NIFEdipine (Urticaria; Hives)  vitamin E (Short breath; Urticaria; Hives)    Intolerances      Standing Inpatient Medications  ALBUTerol/ipratropium for Nebulization 3 milliLiter(s) Nebulizer every 6 hours  aspirin  chewable 81 milliGRAM(s) Oral daily  calamine Lotion 1 Application(s) Topical daily  dextrose 5%. 1000 milliLiter(s) IV Continuous <Continuous>  dextrose 50% Injectable 12.5 Gram(s) IV Push once  dextrose 50% Injectable 25 Gram(s) IV Push once  epoetin odalis Injectable 88756 Unit(s) SubCutaneous <User Schedule>  heparin  Injectable 5000 Unit(s) SubCutaneous every 8 hours  insulin lispro (HumaLOG) corrective regimen sliding scale   SubCutaneous every 6 hours  insulin NPH human recombinant 12 Unit(s) SubCutaneous every 6 hours  labetalol 100 milliGRAM(s) Oral two times a day  metoclopramide 10 milliGRAM(s) Oral every 8 hours  nystatin Powder 1 Application(s) Topical two times a day  nystatin/triamcinolone Ointment 1 Application(s) Topical three times a day  sodium chloride 0.9%. 500 milliLiter(s) IV Continuous <Continuous>  sodium chloride 3%  Inhalation 3 milliLiter(s) Inhalation four times a day    PRN Inpatient Medications  acetaminophen    Suspension. 650 milliGRAM(s) Oral every 6 hours PRN  chlorhexidine 0.12% Liquid 15 milliLiter(s) Swish and Spit every 4 hours PRN  glucagon  Injectable 1 milliGRAM(s) IntraMuscular once PRN  hydrocortisone 1% Ointment 1 Application(s) Topical every 8 hours PRN      REVIEW OF SYSTEMS: unable to obtain   --------------------------------------------------------------------------------       All other systems were reviewed and are negative, except as noted.    VITALS/PHYSICAL EXAM  --------------------------------------------------------------------------------  T(C): 36.8 (03-26-18 @ 05:01), Max: 36.8 (03-26-18 @ 05:01)  HR: 92 (03-26-18 @ 08:38) (83 - 848)  BP: 161/74 (03-26-18 @ 08:08) (154/62 - 161/74)  RR: 16 (03-26-18 @ 08:38) (14 - 19)  SpO2: 98% (03-26-18 @ 08:38) (96% - 100%)  Wt(kg): --        03-25-18 @ 07:01  -  03-26-18 @ 07:00  --------------------------------------------------------  IN: 1885 mL / OUT: 650 mL / NET: 1235 mL    03-26-18 @ 07:01  -  03-26-18 @ 11:39  --------------------------------------------------------  IN: 0 mL / OUT: 250 mL / NET: -250 mL      Physical Exam:  	Gen: NAD, well-appearing  	HEENT: + trach,  supple neck,   	Pulm: CTA B/L  	CV: RRR, S1S2; no rub  	 Abd: +BS, soft, nontender/nondistended, +PEG  	LE: Warm, FROM, no clubbing, intact strength; no edema  	Skin: Warm, without rashes  	Vascular access:+ right chest PC    LABS/STUDIES  --------------------------------------------------------------------------------              7.7    3.0   >-----------<  349      [03-26-18 @ 06:49]              24.2     138  |  94  |  58  ----------------------------<  140      [03-26-18 @ 06:49]  3.6   |  29  |  3.02        Ca     10.7     [03-26-18 @ 06:49]      Mg     2.6     [03-26-18 @ 06:49]      Phos  4.9     [03-26-18 @ 06:49]            Creatinine Trend:  SCr 3.02 [03-26 @ 06:49]  SCr 3.06 [03-25 @ 06:34]  SCr 2.93 [03-24 @ 06:49]  SCr 2.43 [03-23 @ 06:23]  SCr 1.66 [03-22 @ 06:26]        Iron 32, TIBC 248, %sat 13      [03-07-18 @ 14:34]  Ferritin 174      [03-07-18 @ 14:34]  HbA1c 8.6      [01-31-18 @ 07:15]  Lipid: chol 183, , HDL 10,       [02-07-18 @ 13:34]    HBsAb <3.0      [02-28-18 @ 20:23]  HBsAb Nonreact      [03-23-18 @ 07:58]  HBsAg Nonreact      [03-23-18 @ 07:58]  HBcAb Nonreact      [03-23-18 @ 07:58]  HCV 0.24, Nonreact      [03-23-18 @ 07:58]

## 2018-03-26 NOTE — PROGRESS NOTE ADULT - ASSESSMENT
69 y/o F with T2DM uncontrolled with neuropathy and ESRD now on HD on insulin, osteoporosis, HTN, here with acute respiratory failure 2/2 influenza s/p CVA and PEA arrest x 2, and MSSA bacteremia on month long tx for aspiration pna, s/p PEG and trach placement.

## 2018-03-26 NOTE — PROGRESS NOTE ADULT - PROBLEM SELECTOR PLAN 2
-pt has hx of PHPT and OP which I wanted to treat with bisphosphonate therapy in the past but pt refused to go to the dentist - had not been in years.  Bisphosphonate is no longer an option due to her renal failure.

## 2018-03-26 NOTE — PROGRESS NOTE ADULT - ATTENDING COMMENTS
68 yoF with breast cancer, HTN who p/w flu, went into respiratory arrest, then PEA arrest, s/p chest tube, TPA and intubation. Course c/b shock liver, RUSS, pneumoperitoneum, and elevated cardiac enzymes. Chest tubes and peritoneal tubes removed, and shock liver now resolved. Now with Staph aureus bacteremia. Now s/p PEG/trach.      RUSS: Likely ischemic ATN in the setting of shock/sepsis  BUN /Cr stable with improved U/O  would continue to check serial bladder scans today to ensure she is not retaining ( willard dc earlier today)  Hold dialysis today, will reassess in am   Give 500 cc of fluid challenge today  Access: PC present    Anemia: Last dose of DAVID was last week  please recheck iron studies  Will redose in am     Vol/HTN: BP stable . 68 yoF with breast cancer, HTN who p/w flu, went into respiratory arrest, then PEA arrest, s/p chest tube, TPA and intubation. Course c/b shock liver, RUSS, pneumoperitoneum, and elevated cardiac enzymes. Chest tubes and peritoneal tubes removed, and shock liver now resolved. Now with Staph aureus bacteremia. Now s/p PEG/trach.      RUSS: Likely ischemic ATN in the setting of shock/sepsis  BUN /Cr stable with improved U/O  would continue to check serial bladder scans today to ensure she is not retaining ( willard dc earlier today)  Hold dialysis today, will reassess in am   Give 500 cc of fluid challenge today  Access: PC present    Marginal Hypercalcemia: noted  monitor for now    Anemia: Last dose of DAVID was last week  please recheck iron studies  Will redose in am     Vol/HTN: BP stable .

## 2018-03-26 NOTE — PROGRESS NOTE ADULT - SUBJECTIVE AND OBJECTIVE BOX
Chief Complaint/Follow-up on: T2DM    Subjective: Pt opens her eyes and looks at me but non-verbal today.     MEDICATIONS  (STANDING):  ALBUTerol/ipratropium for Nebulization 3 milliLiter(s) Nebulizer every 6 hours  aspirin  chewable 81 milliGRAM(s) Oral daily  calamine Lotion 1 Application(s) Topical daily  dextrose 5%. 1000 milliLiter(s) (50 mL/Hr) IV Continuous <Continuous>  dextrose 50% Injectable 12.5 Gram(s) IV Push once  dextrose 50% Injectable 25 Gram(s) IV Push once  epoetin odalis Injectable 64117 Unit(s) SubCutaneous <User Schedule>  heparin  Injectable 5000 Unit(s) SubCutaneous every 8 hours  insulin lispro (HumaLOG) corrective regimen sliding scale   SubCutaneous every 6 hours  insulin NPH human recombinant 12 Unit(s) SubCutaneous every 6 hours  labetalol 100 milliGRAM(s) Oral two times a day  metoclopramide 10 milliGRAM(s) Oral every 8 hours  nystatin Powder 1 Application(s) Topical two times a day  nystatin/triamcinolone Ointment 1 Application(s) Topical three times a day  sodium chloride 0.9%. 500 milliLiter(s) (125 mL/Hr) IV Continuous <Continuous>  sodium chloride 3%  Inhalation 3 milliLiter(s) Inhalation four times a day    MEDICATIONS  (PRN):  acetaminophen    Suspension. 650 milliGRAM(s) Oral every 6 hours PRN Mild Pain (1 - 3)  chlorhexidine 0.12% Liquid 15 milliLiter(s) Swish and Spit every 4 hours PRN mouth hygiene  glucagon  Injectable 1 milliGRAM(s) IntraMuscular once PRN Glucose LESS THAN 70 milligrams/deciliter  hydrocortisone 1% Ointment 1 Application(s) Topical every 8 hours PRN Rash and/or Itching      PHYSICAL EXAM:  VITALS: T(C): 36.7 (03-26-18 @ 13:16)  T(F): 98.1 (03-26-18 @ 13:16), Max: 98.2 (03-26-18 @ 05:01)  HR: 93 (03-26-18 @ 13:16) (83 - 848)  BP: 162/72 (03-26-18 @ 13:16) (154/62 - 162/72)  RR:  (14 - 19)  SpO2:  (96% - 100%)  Wt(kg): --  GENERAL: NAD, well-groomed, well-developed  HEENT:  Atraumatic, Normocephalic, moist mucous membranes, +trach collar in place  RESPIRATORY: Clear to auscultation bilaterally; No rales, rhonchi, wheezing, or rubs  CARDIOVASCULAR: Regular rate and rhythm; No murmurs  GI: Soft, nontender, non distended, normal bowel sounds      POCT Blood Glucose.: 184 mg/dL (03-26-18 @ 12:10)  POCT Blood Glucose.: 152 mg/dL (03-26-18 @ 06:53)  POCT Blood Glucose.: 133 mg/dL (03-26-18 @ 05:40)  POCT Blood Glucose.: 178 mg/dL (03-25-18 @ 23:37)  POCT Blood Glucose.: 166 mg/dL (03-25-18 @ 17:44)  POCT Blood Glucose.: 153 mg/dL (03-25-18 @ 11:31)  POCT Blood Glucose.: 124 mg/dL (03-25-18 @ 05:38)  POCT Blood Glucose.: 144 mg/dL (03-25-18 @ 00:06)  POCT Blood Glucose.: 212 mg/dL (03-24-18 @ 17:51)  POCT Blood Glucose.: 123 mg/dL (03-24-18 @ 11:52)  POCT Blood Glucose.: 230 mg/dL (03-24-18 @ 06:16)  POCT Blood Glucose.: 169 mg/dL (03-23-18 @ 23:16)  POCT Blood Glucose.: 224 mg/dL (03-23-18 @ 17:58)    03-26    138  |  94<L>  |  58<H>  ----------------------------<  140<H>  3.6   |  29  |  3.02<H>    EGFR if : 17<L>  EGFR if non : 15<L>    Ca    10.7<H>      03-26  Mg     2.6     03-26  Phos  4.9     03-26          Hemoglobin A1C, Whole Blood: 8.6 % <H> [4.0 - 5.6] (01-31-18 @ 07:15)  Hemoglobin A1C, Whole Blood: 8.7 % <H> [4.0 - 5.6] (01-31-18 @ 05:51)

## 2018-03-26 NOTE — PROGRESS NOTE ADULT - ASSESSMENT
67F PMH HTN, DMT2 (70/30 TDD: 90 on admission), Breast Cancer (diagnosed in Feb 2017) currently on Herceptin (last dose Jan 20th), originally presented 1/30 with Fever found to have influenza subsequently went into PEA arrest x 2 with ROSC, complicated by bilateral pneumothoraces s/p 3 chest tubes and paracentesis decompression of the abdomen, then found to have PE s/p tPA 1/30, also s/p new CVA, and also new ESRD requiring almost daily HD.     First MICU admission:  Patient was admitted to MICU for further management. Patient was found to have acute renal failure and shock liver. Nephrology was consulted and patient was started on dialysis. Patient was started on Tamiflu for flu and zosyn for possible aspiration pneumonia. Patients chest tubes and peritoneal drain were removed. Patient redeveloped a pneumothorax on the L side and chest tube was reinserted. 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 Urine legionella was negative and azithromycin was discontinued. Patient continued to be anuric and continued to receive dialysis. Shock liver resolved. Patient was extubated on 2/8/17. Patient completed a 5 day course of tamiflu and 7 day course of zosyn and did not show signs of infection. Patients pneumothorax on L side resolved and chest tube was removed. Patients mental status improved and was alert and oriented x1-2 and spontaneously moves all extremities. Patient had an NG tube placed pending an official speech and swallow eval. Patient failed speech and swallow an a repeat speech and swallow was planned. Patient was then transferred to the floors for further management.     Second MICU admission on 2/21.-3/1  RRT initially called this AM for worsening tachypnea. Pt was evaluated at bedside breathing around 30s with belly breathing, hypoxic to 85% on 40% FIO2, increased to 100% with improvement in oxygen saturation to 97%. Cxray read as worsening pulm edema. ABG showed hypercapnia. Renal called for urgent HD. Also concern for aspiration PNA as increasing density of RLL.     During MICU admission patient treated with 5 days of vanc and zosyn, found to have MSSA bacteremia and MSSA in the sputum, now transitioned to cefazolin until 3/22 (post dialysis). Bacteremia has cleared since 2/22. Patient was extubated on 3/1 to nasal cannula, and was saturating well.    MICU stay #3- 3/12-hypoxic resp. failure--MICU--trach and peg 3/14-15 respectively--transfered back to RCU 3/16 in pm  3/20: multiple episodes of vomiting reglan initiated  3/21- no further vomiting  3/22 - downsized to 6 portex uncuffed in anticipation of speaking valve today  3/23- PMV to bedside. As per  patient spend most of the time sleeping ? trial of central stimulator??  3/26- HD stable.

## 2018-03-26 NOTE — PROGRESS NOTE ADULT - SUBJECTIVE AND OBJECTIVE BOX
Patient is a 68y old  Female who presents with a chief complaint of Fever, total body weakness (02 Feb 2018 13:44)      Interval Events:    REVIEW OF SYSTEMS:  [ ] Positive  [ ] All other systems negative  [x ] Unable to assess ROS because __sleeping______    Vital Signs Last 24 Hrs  T(C): 36.8 (03-26-18 @ 05:01), Max: 36.8 (03-26-18 @ 05:01)  T(F): 98.2 (03-26-18 @ 05:01), Max: 98.2 (03-26-18 @ 05:01)  HR: 92 (03-26-18 @ 08:38) (83 - 848)  BP: 161/74 (03-26-18 @ 08:08) (154/62 - 161/74)  RR: 16 (03-26-18 @ 08:38) (14 - 19)  SpO2: 98% (03-26-18 @ 08:38) (96% - 100%)    PHYSICAL EXAM:  HEENT:   [x ]Tracheostomy:#6 uncuffed  [ ]Pupils equal  [ ]No oral lesions  [ ]Abnormal    SKIN  [x ]No Rash  [ ] Abnormal  [ ] pressure    CARDIAC  [ x]Regular  [ ]Abnormal    PULMONARY  [ x]Bilateral Clear Breath Sounds- diminished throught  [ ]Normal Excursion  [ ]Abnormal    GI  [x ]PEG      [x ] +BS		              [x ]Soft, nondistended, nontender	  [ ]Abnormal    MUSCULOSKELETAL                                   [ ]Bedbound                 [ ]Abnormal    [ ]Ambulatory/OOB to chair                           EXTREMITIES                                         [x ]Normal  [ ]Edema                           NEUROLOGIC  [ ] Normal, non focal  [x ] Focal findings:    PSYCHIATRIC  [ ]Alert and appropriate  [ ] Sedated	 [ ]Agitated    :  Wheeler: [ ] Yes, if yes: Date of Placement:                   [ x ] No    LINES: Central Lines [ ] Yes, if yes: Date of Placement                                     [x  ] No    HOSPITAL MEDICATIONS:  MEDICATIONS  (STANDING):  ALBUTerol/ipratropium for Nebulization 3 milliLiter(s) Nebulizer every 6 hours  aspirin  chewable 81 milliGRAM(s) Oral daily  calamine Lotion 1 Application(s) Topical daily  dextrose 5%. 1000 milliLiter(s) (50 mL/Hr) IV Continuous <Continuous>  dextrose 50% Injectable 12.5 Gram(s) IV Push once  dextrose 50% Injectable 25 Gram(s) IV Push once  epoetin odalis Injectable 53434 Unit(s) SubCutaneous <User Schedule>  heparin  Injectable 5000 Unit(s) SubCutaneous every 8 hours  insulin lispro (HumaLOG) corrective regimen sliding scale   SubCutaneous every 6 hours  insulin NPH human recombinant 12 Unit(s) SubCutaneous every 6 hours  labetalol 100 milliGRAM(s) Oral two times a day  metoclopramide 10 milliGRAM(s) Oral every 8 hours  nystatin Powder 1 Application(s) Topical two times a day  nystatin/triamcinolone Ointment 1 Application(s) Topical three times a day  sodium chloride 0.9%. 500 milliLiter(s) (125 mL/Hr) IV Continuous <Continuous>  sodium chloride 3%  Inhalation 3 milliLiter(s) Inhalation four times a day    MEDICATIONS  (PRN):  acetaminophen    Suspension. 650 milliGRAM(s) Oral every 6 hours PRN Mild Pain (1 - 3)  chlorhexidine 0.12% Liquid 15 milliLiter(s) Swish and Spit every 4 hours PRN mouth hygiene  glucagon  Injectable 1 milliGRAM(s) IntraMuscular once PRN Glucose LESS THAN 70 milligrams/deciliter  hydrocortisone 1% Ointment 1 Application(s) Topical every 8 hours PRN Rash and/or Itching      LABS:                        7.7    3.0   )-----------( 349      ( 26 Mar 2018 06:49 )             24.2     03-26    138  |  94<L>  |  58<H>  ----------------------------<  140<H>  3.6   |  29  |  3.02<H>    Ca    10.7<H>      26 Mar 2018 06:49  Phos  4.9     03-26  Mg     2.6     03-26              CAPILLARY BLOOD GLUCOSE    MICROBIOLOGY:     RADIOLOGY:  [ ] Reviewed and interpreted by me Patient is a 68y old  Female who presents with a chief complaint of Fever, total body weakness (02 Feb 2018 13:44)      Interval Events: No acute events     REVIEW OF SYSTEMS:  [ ] Positive  [ ] All other systems negative  [x ] Unable to assess ROS because __sleeping______    Vital Signs Last 24 Hrs  T(C): 36.8 (03-26-18 @ 05:01), Max: 36.8 (03-26-18 @ 05:01)  T(F): 98.2 (03-26-18 @ 05:01), Max: 98.2 (03-26-18 @ 05:01)  HR: 92 (03-26-18 @ 08:38) (83 - 848)  BP: 161/74 (03-26-18 @ 08:08) (154/62 - 161/74)  RR: 16 (03-26-18 @ 08:38) (14 - 19)  SpO2: 98% (03-26-18 @ 08:38) (96% - 100%)    PHYSICAL EXAM:  HEENT:   [x ]Tracheostomy:#6 uncuffed  [ ]Pupils equal  [ ]No oral lesions  [ ]Abnormal    SKIN  [x ]No Rash  [ ] Abnormal  [ ] pressure    CARDIAC  [ x]Regular  [ ]Abnormal    PULMONARY  [ x]Bilateral Clear Breath Sounds- diminished throught  [ ]Normal Excursion  [ ]Abnormal    GI  [x ]PEG      [x ] +BS		              [x ]Soft, nondistended, nontender	  [ ]Abnormal    MUSCULOSKELETAL                                   [ ]Bedbound                 [ ]Abnormal    [ ]Ambulatory/OOB to chair                           EXTREMITIES                                         [x ]Normal  [ ]Edema                           NEUROLOGIC  [ ] Normal, non focal  [x ] Focal findings:    PSYCHIATRIC  [ ]Alert and appropriate  [ ] Sedated	 [ ]Agitated    :  Wheeler: [ ] Yes, if yes: Date of Placement:                   [ x ] No    LINES: Central Lines [ ] Yes, if yes: Date of Placement                                     [x  ] No    HOSPITAL MEDICATIONS:  MEDICATIONS  (STANDING):  ALBUTerol/ipratropium for Nebulization 3 milliLiter(s) Nebulizer every 6 hours  aspirin  chewable 81 milliGRAM(s) Oral daily  calamine Lotion 1 Application(s) Topical daily  dextrose 5%. 1000 milliLiter(s) (50 mL/Hr) IV Continuous <Continuous>  dextrose 50% Injectable 12.5 Gram(s) IV Push once  dextrose 50% Injectable 25 Gram(s) IV Push once  epoetin odalis Injectable 39333 Unit(s) SubCutaneous <User Schedule>  heparin  Injectable 5000 Unit(s) SubCutaneous every 8 hours  insulin lispro (HumaLOG) corrective regimen sliding scale   SubCutaneous every 6 hours  insulin NPH human recombinant 12 Unit(s) SubCutaneous every 6 hours  labetalol 100 milliGRAM(s) Oral two times a day  metoclopramide 10 milliGRAM(s) Oral every 8 hours  nystatin Powder 1 Application(s) Topical two times a day  nystatin/triamcinolone Ointment 1 Application(s) Topical three times a day  sodium chloride 0.9%. 500 milliLiter(s) (125 mL/Hr) IV Continuous <Continuous>  sodium chloride 3%  Inhalation 3 milliLiter(s) Inhalation four times a day    MEDICATIONS  (PRN):  acetaminophen    Suspension. 650 milliGRAM(s) Oral every 6 hours PRN Mild Pain (1 - 3)  chlorhexidine 0.12% Liquid 15 milliLiter(s) Swish and Spit every 4 hours PRN mouth hygiene  glucagon  Injectable 1 milliGRAM(s) IntraMuscular once PRN Glucose LESS THAN 70 milligrams/deciliter  hydrocortisone 1% Ointment 1 Application(s) Topical every 8 hours PRN Rash and/or Itching      LABS:                        7.7    3.0   )-----------( 349      ( 26 Mar 2018 06:49 )             24.2     03-26    138  |  94<L>  |  58<H>  ----------------------------<  140<H>  3.6   |  29  |  3.02<H>    Ca    10.7<H>      26 Mar 2018 06:49  Phos  4.9     03-26  Mg     2.6     03-26              CAPILLARY BLOOD GLUCOSE    MICROBIOLOGY:     RADIOLOGY:  [ ] Reviewed and interpreted by me

## 2018-03-27 LAB
ANION GAP SERPL CALC-SCNC: 11 MMOL/L — SIGNIFICANT CHANGE UP (ref 5–17)
BUN SERPL-MCNC: 62 MG/DL — HIGH (ref 7–23)
CALCIUM SERPL-MCNC: 10.6 MG/DL — HIGH (ref 8.4–10.5)
CHLORIDE SERPL-SCNC: 97 MMOL/L — SIGNIFICANT CHANGE UP (ref 96–108)
CO2 SERPL-SCNC: 30 MMOL/L — SIGNIFICANT CHANGE UP (ref 22–31)
CREAT SERPL-MCNC: 2.76 MG/DL — HIGH (ref 0.5–1.3)
GLUCOSE BLDC GLUCOMTR-MCNC: 165 MG/DL — HIGH (ref 70–99)
GLUCOSE BLDC GLUCOMTR-MCNC: 190 MG/DL — HIGH (ref 70–99)
GLUCOSE BLDC GLUCOMTR-MCNC: 192 MG/DL — HIGH (ref 70–99)
GLUCOSE BLDC GLUCOMTR-MCNC: 193 MG/DL — HIGH (ref 70–99)
GLUCOSE SERPL-MCNC: 175 MG/DL — HIGH (ref 70–99)
HCT VFR BLD CALC: 26.9 % — LOW (ref 34.5–45)
HGB BLD-MCNC: 8.2 G/DL — LOW (ref 11.5–15.5)
MAGNESIUM SERPL-MCNC: 2.6 MG/DL — SIGNIFICANT CHANGE UP (ref 1.6–2.6)
MCHC RBC-ENTMCNC: 28 PG — SIGNIFICANT CHANGE UP (ref 27–34)
MCHC RBC-ENTMCNC: 30.6 GM/DL — LOW (ref 32–36)
MCV RBC AUTO: 91.5 FL — SIGNIFICANT CHANGE UP (ref 80–100)
PHOSPHATE SERPL-MCNC: 5.4 MG/DL — HIGH (ref 2.5–4.5)
PLATELET # BLD AUTO: 412 K/UL — HIGH (ref 150–400)
POTASSIUM SERPL-MCNC: 3.5 MMOL/L — SIGNIFICANT CHANGE UP (ref 3.5–5.3)
POTASSIUM SERPL-SCNC: 3.5 MMOL/L — SIGNIFICANT CHANGE UP (ref 3.5–5.3)
RBC # BLD: 2.94 M/UL — LOW (ref 3.8–5.2)
RBC # FLD: 15.5 % — HIGH (ref 10.3–14.5)
SODIUM SERPL-SCNC: 138 MMOL/L — SIGNIFICANT CHANGE UP (ref 135–145)
WBC # BLD: 3.2 K/UL — LOW (ref 3.8–10.5)
WBC # FLD AUTO: 3.2 K/UL — LOW (ref 3.8–10.5)

## 2018-03-27 PROCEDURE — 99232 SBSQ HOSP IP/OBS MODERATE 35: CPT | Mod: GC

## 2018-03-27 PROCEDURE — 99497 ADVNCD CARE PLAN 30 MIN: CPT | Mod: GC

## 2018-03-27 PROCEDURE — 99233 SBSQ HOSP IP/OBS HIGH 50: CPT

## 2018-03-27 PROCEDURE — 99233 SBSQ HOSP IP/OBS HIGH 50: CPT | Mod: 25,GC

## 2018-03-27 RX ADMIN — NYSTATIN CREAM 1 APPLICATION(S): 100000 CREAM TOPICAL at 07:04

## 2018-03-27 RX ADMIN — CALAMINE 8% AND ZINC OXIDE 8% 1 APPLICATION(S): 160 LOTION TOPICAL at 12:11

## 2018-03-27 RX ADMIN — NYSTATIN CREAM 1 APPLICATION(S): 100000 CREAM TOPICAL at 17:49

## 2018-03-27 RX ADMIN — Medication 100 MILLIGRAM(S): at 07:41

## 2018-03-27 RX ADMIN — Medication 3 MILLILITER(S): at 18:23

## 2018-03-27 RX ADMIN — HEPARIN SODIUM 5000 UNIT(S): 5000 INJECTION INTRAVENOUS; SUBCUTANEOUS at 07:03

## 2018-03-27 RX ADMIN — HUMAN INSULIN 12 UNIT(S): 100 INJECTION, SUSPENSION SUBCUTANEOUS at 17:49

## 2018-03-27 RX ADMIN — Medication 2: at 17:49

## 2018-03-27 RX ADMIN — Medication 3 MILLILITER(S): at 05:30

## 2018-03-27 RX ADMIN — Medication 10 MILLIGRAM(S): at 22:35

## 2018-03-27 RX ADMIN — SODIUM CHLORIDE 3 MILLILITER(S): 9 INJECTION INTRAMUSCULAR; INTRAVENOUS; SUBCUTANEOUS at 12:14

## 2018-03-27 RX ADMIN — HEPARIN SODIUM 5000 UNIT(S): 5000 INJECTION INTRAVENOUS; SUBCUTANEOUS at 13:14

## 2018-03-27 RX ADMIN — Medication 10 MILLIGRAM(S): at 07:03

## 2018-03-27 RX ADMIN — Medication 10 MILLIGRAM(S): at 13:14

## 2018-03-27 RX ADMIN — HEPARIN SODIUM 5000 UNIT(S): 5000 INJECTION INTRAVENOUS; SUBCUTANEOUS at 22:35

## 2018-03-27 RX ADMIN — Medication 2: at 23:49

## 2018-03-27 RX ADMIN — SODIUM CHLORIDE 3 MILLILITER(S): 9 INJECTION INTRAMUSCULAR; INTRAVENOUS; SUBCUTANEOUS at 05:34

## 2018-03-27 RX ADMIN — Medication 3 MILLILITER(S): at 12:13

## 2018-03-27 RX ADMIN — Medication 81 MILLIGRAM(S): at 13:13

## 2018-03-27 RX ADMIN — Medication 100 MILLIGRAM(S): at 20:27

## 2018-03-27 RX ADMIN — Medication 2: at 12:10

## 2018-03-27 RX ADMIN — Medication 1 APPLICATION(S): at 07:04

## 2018-03-27 RX ADMIN — SODIUM CHLORIDE 3 MILLILITER(S): 9 INJECTION INTRAMUSCULAR; INTRAVENOUS; SUBCUTANEOUS at 18:23

## 2018-03-27 RX ADMIN — Medication 2: at 07:03

## 2018-03-27 RX ADMIN — HUMAN INSULIN 12 UNIT(S): 100 INJECTION, SUSPENSION SUBCUTANEOUS at 12:10

## 2018-03-27 RX ADMIN — HUMAN INSULIN 12 UNIT(S): 100 INJECTION, SUSPENSION SUBCUTANEOUS at 07:03

## 2018-03-27 RX ADMIN — HUMAN INSULIN 12 UNIT(S): 100 INJECTION, SUSPENSION SUBCUTANEOUS at 23:49

## 2018-03-27 NOTE — PROGRESS NOTE ADULT - ATTENDING COMMENTS
68 yoF with breast cancer, HTN who p/w flu, went into respiratory arrest, then PEA arrest, s/p chest tube, TPA and intubation. Course c/b shock liver, RUSS, pneumoperitoneum, and elevated cardiac enzymes. Chest tubes and peritoneal tubes removed, and shock liver now resolved. Now with Staph aureus bacteremia. Now s/p PEG/trach.      RUSS: Likely ischemic ATN in the setting of shock/sepsis  Renal function slightly better.  would continue to check serial bladder scans today to ensure she is not retaining   Hold dialysis    If renal function continues to improve over the next 1-2 days, she can be discharged to a facility with no Dialysis  Access: PC present    Marginal Hypercalcemia: noted  likely from immobilization  Check intact PTH    Anemia: Last dose of DAVID was last week  please recheck iron studies  Start aranesp 40 mcg SQ Q WEEKLY    Vol/HTN: BP stable .

## 2018-03-27 NOTE — PROGRESS NOTE ADULT - ATTENDING COMMENTS
Pt seen and examined. Agree with above with addendum: pt conversant with me, not that she has a cap for her trach. She is feeling thirsty and requests water. Her calcium continues to be slightly elevated, but both renal and I agree that she does not need sensipar at this time.

## 2018-03-27 NOTE — PROGRESS NOTE ADULT - ATTENDING COMMENTS
Pt seen and examined, agree with above. 67 F with HTN, DM 2, obesity, breast CA now with prolonged hospital course due to acute resp failure with hypoxia 2/2 influenza and bacterial pneumonia, PEA arrest x 2, bilateral pneumothoraces, septic shock, pre-renal ATN requiring HD, CVA, PE s/p tPA. Now s/p trach/ PEG. Tolerating TC with PMV. Cont pulm toilet. Oropharyngeal dysphagia tolerating PEG feeds. Awaiting repeat swallow evalv. Currently labs stable off HD. Cont bladder scan Q6H per renal recs.  updated at bedside. Advanced care planning time spent: 35 mins

## 2018-03-27 NOTE — PROGRESS NOTE ADULT - SUBJECTIVE AND OBJECTIVE BOX
Chief Complaint: T2DM    History: Patient responsive but non-verbal. Noted FSBG controlled on current regimen. Tolerating tube feeds. Required additional 6U total of Humalog SSI in the last 24 hours.    MEDICATIONS  (STANDING):  ALBUTerol/ipratropium for Nebulization 3 milliLiter(s) Nebulizer every 6 hours  aspirin  chewable 81 milliGRAM(s) Oral daily  calamine Lotion 1 Application(s) Topical daily  dextrose 5%. 1000 milliLiter(s) (50 mL/Hr) IV Continuous <Continuous>  dextrose 50% Injectable 12.5 Gram(s) IV Push once  dextrose 50% Injectable 25 Gram(s) IV Push once  epoetin odalis Injectable 51470 Unit(s) SubCutaneous <User Schedule>  heparin  Injectable 5000 Unit(s) SubCutaneous every 8 hours  insulin lispro (HumaLOG) corrective regimen sliding scale   SubCutaneous every 6 hours  insulin NPH human recombinant 12 Unit(s) SubCutaneous every 6 hours  labetalol 100 milliGRAM(s) Oral two times a day  metoclopramide 10 milliGRAM(s) Oral every 8 hours  nystatin Powder 1 Application(s) Topical two times a day  sodium chloride 0.9%. 500 milliLiter(s) (125 mL/Hr) IV Continuous <Continuous>  sodium chloride 0.9%. 500 milliLiter(s) (125 mL/Hr) IV Continuous <Continuous>  sodium chloride 3%  Inhalation 3 milliLiter(s) Inhalation four times a day    MEDICATIONS  (PRN):  acetaminophen    Suspension. 650 milliGRAM(s) Oral every 6 hours PRN Mild Pain (1 - 3)  chlorhexidine 0.12% Liquid 15 milliLiter(s) Swish and Spit every 4 hours PRN mouth hygiene  glucagon  Injectable 1 milliGRAM(s) IntraMuscular once PRN Glucose LESS THAN 70 milligrams/deciliter  hydrocortisone 1% Ointment 1 Application(s) Topical every 8 hours PRN Rash and/or Itching      PHYSICAL EXAM:  VITALS: T(C): 36.7 (03-27-18 @ 04:14)  T(F): 98.1 (03-27-18 @ 04:14), Max: 98.1 (03-26-18 @ 13:16)  HR: 84 (03-27-18 @ 08:34) (84 - 100)  BP: 156/76 (03-27-18 @ 07:40) (156/76 - 176/81)  RR:  (16 - 20)  SpO2:  (93% - 100%)  Wt(kg): --  GENERAL: NAD, well-groomed, well-developed  EYES: No proptosis, no injection  HEENT:  Atraumatic, Normocephalic, moist mucous membranes  THYROID: Normal size, no palpable nodules  RESPIRATORY: Clear to auscultation bilaterally; No rales, rhonchi, wheezing, or rubs  CARDIOVASCULAR: Regular rate and rhythm; No murmurs; no peripheral edema  GI: Soft, nontender, non distended, normal bowel sounds  CUSHING'S SIGNS: no striae    POCT Blood Glucose.: 165 mg/dL (03-27-18 @ 06:17)  POCT Blood Glucose.: 117 mg/dL (03-26-18 @ 23:41)  POCT Blood Glucose.: 149 mg/dL (03-26-18 @ 17:43)  POCT Blood Glucose.: 184 mg/dL (03-26-18 @ 12:10)  POCT Blood Glucose.: 152 mg/dL (03-26-18 @ 06:53)  POCT Blood Glucose.: 133 mg/dL (03-26-18 @ 05:40)  POCT Blood Glucose.: 178 mg/dL (03-25-18 @ 23:37)  POCT Blood Glucose.: 166 mg/dL (03-25-18 @ 17:44)  POCT Blood Glucose.: 153 mg/dL (03-25-18 @ 11:31)  POCT Blood Glucose.: 124 mg/dL (03-25-18 @ 05:38)  POCT Blood Glucose.: 144 mg/dL (03-25-18 @ 00:06)  POCT Blood Glucose.: 212 mg/dL (03-24-18 @ 17:51)  POCT Blood Glucose.: 123 mg/dL (03-24-18 @ 11:52)      03-27    138  |  97  |  62<H>  ----------------------------<  175<H>  3.5   |  30  |  2.76<H>    EGFR if : 19<L>  EGFR if non : 17<L>    Ca    10.6<H>      03-27  Mg     2.6     03-27  Phos  5.4     03-27    Thyroid Function Tests:      Hemoglobin A1C, Whole Blood: 8.6 % <H> [4.0 - 5.6] (01-31-18 @ 07:15)  Hemoglobin A1C, Whole Blood: 8.7 % <H> [4.0 - 5.6] (01-31-18 @ 05:51) Chief Complaint: T2DM    History: Patient responsive but non-verbal. Noted FSBG controlled on current regimen. Tolerating tube feeds. Required additional 6U total of Humalog SSI in the last 24 hours.    MEDICATIONS  (STANDING):  ALBUTerol/ipratropium for Nebulization 3 milliLiter(s) Nebulizer every 6 hours  aspirin  chewable 81 milliGRAM(s) Oral daily  calamine Lotion 1 Application(s) Topical daily  dextrose 5%. 1000 milliLiter(s) (50 mL/Hr) IV Continuous <Continuous>  dextrose 50% Injectable 12.5 Gram(s) IV Push once  dextrose 50% Injectable 25 Gram(s) IV Push once  epoetin odalis Injectable 15069 Unit(s) SubCutaneous <User Schedule>  heparin  Injectable 5000 Unit(s) SubCutaneous every 8 hours  insulin lispro (HumaLOG) corrective regimen sliding scale   SubCutaneous every 6 hours  insulin NPH human recombinant 12 Unit(s) SubCutaneous every 6 hours  labetalol 100 milliGRAM(s) Oral two times a day  metoclopramide 10 milliGRAM(s) Oral every 8 hours  nystatin Powder 1 Application(s) Topical two times a day  sodium chloride 0.9%. 500 milliLiter(s) (125 mL/Hr) IV Continuous <Continuous>  sodium chloride 0.9%. 500 milliLiter(s) (125 mL/Hr) IV Continuous <Continuous>  sodium chloride 3%  Inhalation 3 milliLiter(s) Inhalation four times a day    MEDICATIONS  (PRN):  acetaminophen    Suspension. 650 milliGRAM(s) Oral every 6 hours PRN Mild Pain (1 - 3)  chlorhexidine 0.12% Liquid 15 milliLiter(s) Swish and Spit every 4 hours PRN mouth hygiene  glucagon  Injectable 1 milliGRAM(s) IntraMuscular once PRN Glucose LESS THAN 70 milligrams/deciliter  hydrocortisone 1% Ointment 1 Application(s) Topical every 8 hours PRN Rash and/or Itching      PHYSICAL EXAM:  VITALS: T(C): 36.7 (03-27-18 @ 04:14)  T(F): 98.1 (03-27-18 @ 04:14), Max: 98.1 (03-26-18 @ 13:16)  HR: 84 (03-27-18 @ 08:34) (84 - 100)  BP: 156/76 (03-27-18 @ 07:40) (156/76 - 176/81)  RR:  (16 - 20)  SpO2:  (93% - 100%)  Wt(kg): --  GENERAL: NAD, well-groomed, well-developed  EYES: No proptosis, no injection  HEENT:  Atraumatic, Normocephalic, moist mucous membranes  THYROID: Normal size, no palpable nodules  RESPIRATORY: Clear to auscultation bilaterally; No rales, rhonchi, wheezing, or rubs  CARDIOVASCULAR: Regular rate and rhythm; No murmurs; no peripheral edema  GI: Soft, nontender, non distended, normal bowel sounds  CUSHING'S SIGNS: no striae    POCT Blood Glucose.: 165 mg/dL (03-27-18 @ 06:17)  POCT Blood Glucose.: 117 mg/dL (03-26-18 @ 23:41)  POCT Blood Glucose.: 149 mg/dL (03-26-18 @ 17:43)  POCT Blood Glucose.: 184 mg/dL (03-26-18 @ 12:10)  POCT Blood Glucose.: 152 mg/dL (03-26-18 @ 06:53)  POCT Blood Glucose.: 133 mg/dL (03-26-18 @ 05:40)  POCT Blood Glucose.: 178 mg/dL (03-25-18 @ 23:37)  POCT Blood Glucose.: 166 mg/dL (03-25-18 @ 17:44)  POCT Blood Glucose.: 153 mg/dL (03-25-18 @ 11:31)  POCT Blood Glucose.: 124 mg/dL (03-25-18 @ 05:38)  POCT Blood Glucose.: 144 mg/dL (03-25-18 @ 00:06)  POCT Blood Glucose.: 212 mg/dL (03-24-18 @ 17:51)  POCT Blood Glucose.: 123 mg/dL (03-24-18 @ 11:52)      03-27    138  |  97  |  62<H>  ----------------------------<  175<H>  3.5   |  30  |  2.76<H>    EGFR if : 19<L>  EGFR if non : 17<L>    Ca    10.6<H>      03-27  Mg     2.6     03-27  Phos  5.4     03-27    Thyroid Function Tests:      Hemoglobin A1C, Whole Blood: 8.6 % <H> [4.0 - 5.6] (01-31-18 @ 07:15)  Hemoglobin A1C, Whole Blood: 8.7 % <H> [4.0 - 5.6] (01-31-18 @ 05:51) Chief Complaint: T2DM    History: Patient responsive but non-verbal. Noted FSBG controlled on current regimen. Tolerating tube feeds. Required additional 6U total of Humalog SSI in the last 24 hours.    MEDICATIONS  (STANDING):  ALBUTerol/ipratropium for Nebulization 3 milliLiter(s) Nebulizer every 6 hours  aspirin  chewable 81 milliGRAM(s) Oral daily  calamine Lotion 1 Application(s) Topical daily  dextrose 5%. 1000 milliLiter(s) (50 mL/Hr) IV Continuous <Continuous>  dextrose 50% Injectable 12.5 Gram(s) IV Push once  dextrose 50% Injectable 25 Gram(s) IV Push once  epoetin odalis Injectable 26430 Unit(s) SubCutaneous <User Schedule>  heparin  Injectable 5000 Unit(s) SubCutaneous every 8 hours  insulin lispro (HumaLOG) corrective regimen sliding scale   SubCutaneous every 6 hours  insulin NPH human recombinant 12 Unit(s) SubCutaneous every 6 hours  labetalol 100 milliGRAM(s) Oral two times a day  metoclopramide 10 milliGRAM(s) Oral every 8 hours  nystatin Powder 1 Application(s) Topical two times a day  sodium chloride 0.9%. 500 milliLiter(s) (125 mL/Hr) IV Continuous <Continuous>  sodium chloride 0.9%. 500 milliLiter(s) (125 mL/Hr) IV Continuous <Continuous>  sodium chloride 3%  Inhalation 3 milliLiter(s) Inhalation four times a day    MEDICATIONS  (PRN):  acetaminophen    Suspension. 650 milliGRAM(s) Oral every 6 hours PRN Mild Pain (1 - 3)  chlorhexidine 0.12% Liquid 15 milliLiter(s) Swish and Spit every 4 hours PRN mouth hygiene  glucagon  Injectable 1 milliGRAM(s) IntraMuscular once PRN Glucose LESS THAN 70 milligrams/deciliter  hydrocortisone 1% Ointment 1 Application(s) Topical every 8 hours PRN Rash and/or Itching      PHYSICAL EXAM:  VITALS:  T(F): 98.1 (03-27-18 @ 04:14), Max: 98.1 (03-26-18 @ 13:16)  HR: 84 (03-27-18 @ 08:34) (84 - 100)  BP: 156/76 (03-27-18 @ 07:40) (156/76 - 176/81)  RR:  (16 - 20)  SpO2:  (93% - 100%)    GENERAL: NAD, well-groomed, well-developed  HEENT:  Atraumatic, Normocephalic, moist mucous membranes, +trach collar in place  RESPIRATORY: Clear to auscultation bilaterally; No rales, rhonchi, wheezing, or rubs  CARDIOVASCULAR: Regular rate and rhythm; No murmurs  GI: Soft, nontender, non distended, normal bowel sounds +PEG in place, no surrounding drainage or erythema    POCT Blood Glucose.: 165 mg/dL (03-27-18 @ 06:17)  POCT Blood Glucose.: 117 mg/dL (03-26-18 @ 23:41)  POCT Blood Glucose.: 149 mg/dL (03-26-18 @ 17:43)  POCT Blood Glucose.: 184 mg/dL (03-26-18 @ 12:10)  POCT Blood Glucose.: 152 mg/dL (03-26-18 @ 06:53)  POCT Blood Glucose.: 133 mg/dL (03-26-18 @ 05:40)  POCT Blood Glucose.: 178 mg/dL (03-25-18 @ 23:37)  POCT Blood Glucose.: 166 mg/dL (03-25-18 @ 17:44)  POCT Blood Glucose.: 153 mg/dL (03-25-18 @ 11:31)  POCT Blood Glucose.: 124 mg/dL (03-25-18 @ 05:38)  POCT Blood Glucose.: 144 mg/dL (03-25-18 @ 00:06)  POCT Blood Glucose.: 212 mg/dL (03-24-18 @ 17:51)  POCT Blood Glucose.: 123 mg/dL (03-24-18 @ 11:52)      03-27    138  |  97  |  62<H>  ----------------------------<  175<H>  3.5   |  30  |  2.76<H>    EGFR if : 19<L>  EGFR if non : 17<L>    Ca    10.6<H>      03-27  Mg     2.6     03-27  Phos  5.4     03-27    Thyroid Function Tests:      Hemoglobin A1C, Whole Blood: 8.6 % <H> [4.0 - 5.6] (01-31-18 @ 07:15)  Hemoglobin A1C, Whole Blood: 8.7 % <H> [4.0 - 5.6] (01-31-18 @ 05:51)

## 2018-03-27 NOTE — PROGRESS NOTE ADULT - SUBJECTIVE AND OBJECTIVE BOX
[facility] DIVISION OF KIDNEY DISEASES AND HYPERTENSION -- FOLLOW UP NOTE  --------------------------------------------------------------------------------  Chief Complaint: RUSS on HD    24 hour events/subjective:  None        PAST HISTORY  --------------------------------------------------------------------------------  No significant changes to PMH, PSH, FHx, SHx, unless otherwise noted    ALLERGIES & MEDICATIONS  --------------------------------------------------------------------------------  Allergies    doxycycline (Rash)  isoniazid (Rash)  NIFEdipine (Urticaria; Hives)  vitamin E (Short breath; Urticaria; Hives)    Intolerances      Standing Inpatient Medications  ALBUTerol/ipratropium for Nebulization 3 milliLiter(s) Nebulizer every 6 hours  aspirin  chewable 81 milliGRAM(s) Oral daily  calamine Lotion 1 Application(s) Topical daily  dextrose 5%. 1000 milliLiter(s) IV Continuous <Continuous>  dextrose 50% Injectable 12.5 Gram(s) IV Push once  dextrose 50% Injectable 25 Gram(s) IV Push once  epoetin odalis Injectable 17893 Unit(s) SubCutaneous <User Schedule>  heparin  Injectable 5000 Unit(s) SubCutaneous every 8 hours  insulin lispro (HumaLOG) corrective regimen sliding scale   SubCutaneous every 6 hours  insulin NPH human recombinant 12 Unit(s) SubCutaneous every 6 hours  labetalol 100 milliGRAM(s) Oral two times a day  metoclopramide 10 milliGRAM(s) Oral every 8 hours  nystatin Powder 1 Application(s) Topical two times a day  sodium chloride 0.9%. 500 milliLiter(s) IV Continuous <Continuous>  sodium chloride 0.9%. 500 milliLiter(s) IV Continuous <Continuous>  sodium chloride 3%  Inhalation 3 milliLiter(s) Inhalation four times a day    PRN Inpatient Medications  acetaminophen    Suspension. 650 milliGRAM(s) Oral every 6 hours PRN  chlorhexidine 0.12% Liquid 15 milliLiter(s) Swish and Spit every 4 hours PRN  glucagon  Injectable 1 milliGRAM(s) IntraMuscular once PRN  hydrocortisone 1% Ointment 1 Application(s) Topical every 8 hours PRN      REVIEW OF SYSTEMS: unable to obtain   --------------------------------------------------------------------------------       All other systems were reviewed and are negative, except as noted.    VITALS/PHYSICAL EXAM  --------------------------------------------------------------------------------  T(C): 36.7 (03-27-18 @ 04:14), Max: 36.7 (03-26-18 @ 13:16)  HR: 84 (03-27-18 @ 08:34) (84 - 100)  BP: 156/76 (03-27-18 @ 07:40) (156/76 - 176/81)  RR: 20 (03-27-18 @ 08:34) (16 - 20)  SpO2: 98% (03-27-18 @ 08:34) (93% - 100%)  Wt(kg): --        03-26-18 @ 07:01  -  03-27-18 @ 07:00  --------------------------------------------------------  IN: 1440 mL / OUT: 250 mL / NET: 1190 mL      Physical Exam:  	Gen:+awake, NAD   	HEENT:+Trach  	Pulm: CTA B/L  	CV: RRR, S1S2; no rub  	Abd: +BS, soft, nontender/nondistended, + PEG  	LE: Warm, FROM, no clubbing, intact strength; no edema  	Skin: Warm, without rashes  	Vascular access: Rt chest PC    LABS/STUDIES  --------------------------------------------------------------------------------              8.2    3.2   >-----------<  412      [03-27-18 @ 07:36]              26.9     138  |  97  |  62  ----------------------------<  175      [03-27-18 @ 07:36]  3.5   |  30  |  2.76        Ca     10.6     [03-27-18 @ 07:36]      Mg     2.6     [03-27-18 @ 07:36]      Phos  5.4     [03-27-18 @ 07:36]            Creatinine Trend:  SCr 2.76 [03-27 @ 07:36]  SCr 3.02 [03-26 @ 06:49]  SCr 3.06 [03-25 @ 06:34]  SCr 2.93 [03-24 @ 06:49]  SCr 2.43 [03-23 @ 06:23]        Iron 32, TIBC 248, %sat 13      [03-07-18 @ 14:34]  Ferritin 174      [03-07-18 @ 14:34]  HbA1c 8.6      [01-31-18 @ 07:15]  Lipid: chol 183, , HDL 10,       [02-07-18 @ 13:34]    HBsAb <3.0      [02-28-18 @ 20:23]  HBsAb Nonreact      [03-23-18 @ 07:58]  HBsAg Nonreact      [03-23-18 @ 07:58]  HBcAb Nonreact      [03-23-18 @ 07:58]  HCV 0.24, Nonreact      [03-23-18 @ 07:58]

## 2018-03-27 NOTE — PROGRESS NOTE ADULT - PROBLEM SELECTOR PLAN 2
-pt has hx of PHPT and OP which I wanted to treat with bisphosphonate therapy in the past but pt refused to go to the dentist - had not been in years.  Bisphosphonate is no longer an option due to her renal failure. -Discussed with renal, patient no longer requires HD.  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

## 2018-03-27 NOTE — PROGRESS NOTE ADULT - ASSESSMENT
67F PMH HTN, DMT2 (70/30 TDD: 90 on admission), Breast Cancer (diagnosed in Feb 2017) currently on Herceptin (last dose Jan 20th), originally presented 1/30 with Fever found to have influenza subsequently went into PEA arrest x 2 with ROSC, complicated by bilateral pneumothoraces s/p 3 chest tubes and paracentesis decompression of the abdomen, then found to have PE s/p tPA 1/30, also s/p new CVA, and also new ESRD requiring almost daily HD.     First MICU admission:  Patient was admitted to MICU for further management. Patient was found to have acute renal failure and shock liver. Nephrology was consulted and patient was started on dialysis. Patient was started on Tamiflu for flu and zosyn for possible aspiration pneumonia. Patients chest tubes and peritoneal drain were removed. Patient redeveloped a pneumothorax on the L side and chest tube was reinserted. 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 Urine legionella was negative and azithromycin was discontinued. Patient continued to be anuric and continued to receive dialysis. Shock liver resolved. Patient was extubated on 2/8/17. Patient completed a 5 day course of tamiflu and 7 day course of zosyn and did not show signs of infection. Patients pneumothorax on L side resolved and chest tube was removed. Patients mental status improved and was alert and oriented x1-2 and spontaneously moves all extremities. Patient had an NG tube placed pending an official speech and swallow eval. Patient failed speech and swallow an a repeat speech and swallow was planned. Patient was then transferred to the floors for further management.     Second MICU admission on 2/21.-3/1  RRT initially called this AM for worsening tachypnea. Pt was evaluated at bedside breathing around 30s with belly breathing, hypoxic to 85% on 40% FIO2, increased to 100% with improvement in oxygen saturation to 97%. Cxray read as worsening pulm edema. ABG showed hypercapnia. Renal called for urgent HD. Also concern for aspiration PNA as increasing density of RLL.     During MICU admission patient treated with 5 days of vanc and zosyn, found to have MSSA bacteremia and MSSA in the sputum, now transitioned to cefazolin until 3/22 (post dialysis). Bacteremia has cleared since 2/22. Patient was extubated on 3/1 to nasal cannula, and was saturating well.    MICU stay #3- 3/12-hypoxic resp. failure--MICU--trach and peg 3/14-15 respectively--transfered back to RCU 3/16 in pm  3/20: multiple episodes of vomiting reglan initiated  3/21- no further vomiting  3/22 - downsized to 6 portex uncuffed in anticipation of speaking valve today  3/23- PMV to bedside. As per  patient spend most of the time sleeping ? trial of central stimulator??  3/26- HD stable.   3/27- D/w renal will watch renal function tomorrow, if her number continue to improve likely discharge with no requirement for dialysis.

## 2018-03-27 NOTE — PROGRESS NOTE ADULT - SUBJECTIVE AND OBJECTIVE BOX
Patient is a 68y old  Female who presents with a chief complaint of Fever, total body weakness (02 Feb 2018 13:44)      Interval Events:    REVIEW OF SYSTEMS:  [ ] Positive  [x ] All other systems negative  [ ] Unable to assess ROS because ________    Vital Signs Last 24 Hrs  T(C): 36.7 (03-27-18 @ 04:14), Max: 36.7 (03-26-18 @ 13:16)  T(F): 98.1 (03-27-18 @ 04:14), Max: 98.1 (03-26-18 @ 13:16)  HR: 84 (03-27-18 @ 08:34) (84 - 100)  BP: 156/76 (03-27-18 @ 07:40) (156/76 - 176/81)  RR: 20 (03-27-18 @ 08:34) (16 - 20)  SpO2: 98% (03-27-18 @ 08:34) (93% - 100%)    PHYSICAL EXAM:  HEENT:   [x ]Tracheostomy:#6 portex uncuffed  [ ]Pupils equal  [ ]No oral lesions  [ ]Abnormal    SKIN  [x ]No Rash  [ ] Abnormal  [ ] pressure    CARDIAC  [ x]Regular  [ ]Abnormal    PULMONARY  [ x]Bilateral Clear Breath Sounds  [ ]Normal Excursion  [ ]Abnormal    GI  [ x]PEG      [ x] +BS		              [x ]Soft, nondistended, nontender	  [ ]Abnormal    MUSCULOSKELETAL                                   [ ]Bedbound                 [ ]Abnormal    [x ]Ambulatory/OOB to chair                           EXTREMITIES                                         [ x]Normal  [ ]Edema                           NEUROLOGIC  [x ] Normal, non focal  [ ] Focal findings:    PSYCHIATRIC  [x ]Alert and appropriate  [ ] Sedated	 [ ]Agitated    :  Wheeler: [ ] Yes, if yes: Date of Placement:                   [x  ] No    LINES: Central Lines [x ] Yes, if yes: Date of Placement: Date of Placement Right perma cath 3/8 by IR and Mediport- placed as out patient 5/27/17                 [  ] No    HOSPITAL MEDICATIONS:  MEDICATIONS  (STANDING):  ALBUTerol/ipratropium for Nebulization 3 milliLiter(s) Nebulizer every 6 hours  aspirin  chewable 81 milliGRAM(s) Oral daily  calamine Lotion 1 Application(s) Topical daily  dextrose 5%. 1000 milliLiter(s) (50 mL/Hr) IV Continuous <Continuous>  dextrose 50% Injectable 12.5 Gram(s) IV Push once  dextrose 50% Injectable 25 Gram(s) IV Push once  epoetin odalis Injectable 91253 Unit(s) SubCutaneous <User Schedule>  heparin  Injectable 5000 Unit(s) SubCutaneous every 8 hours  insulin lispro (HumaLOG) corrective regimen sliding scale   SubCutaneous every 6 hours  insulin NPH human recombinant 12 Unit(s) SubCutaneous every 6 hours  labetalol 100 milliGRAM(s) Oral two times a day  metoclopramide 10 milliGRAM(s) Oral every 8 hours  nystatin Powder 1 Application(s) Topical two times a day  sodium chloride 0.9%. 500 milliLiter(s) (125 mL/Hr) IV Continuous <Continuous>  sodium chloride 0.9%. 500 milliLiter(s) (125 mL/Hr) IV Continuous <Continuous>  sodium chloride 3%  Inhalation 3 milliLiter(s) Inhalation four times a day    MEDICATIONS  (PRN):  acetaminophen    Suspension. 650 milliGRAM(s) Oral every 6 hours PRN Mild Pain (1 - 3)  chlorhexidine 0.12% Liquid 15 milliLiter(s) Swish and Spit every 4 hours PRN mouth hygiene  glucagon  Injectable 1 milliGRAM(s) IntraMuscular once PRN Glucose LESS THAN 70 milligrams/deciliter  hydrocortisone 1% Ointment 1 Application(s) Topical every 8 hours PRN Rash and/or Itching      LABS:                        8.2    3.2   )-----------( 412      ( 27 Mar 2018 07:36 )             26.9     03-27    138  |  97  |  62<H>  ----------------------------<  175<H>  3.5   |  30  |  2.76<H>    Ca    10.6<H>      27 Mar 2018 07:36  Phos  5.4     03-27  Mg     2.6     03-27              CAPILLARY BLOOD GLUCOSE    MICROBIOLOGY:     RADIOLOGY:  [ ] Reviewed and interpreted by me Patient is a 68y old  Female who presents with a chief complaint of Fever, total body weakness (02 Feb 2018 13:44)      Interval Events: No acute events     REVIEW OF SYSTEMS:  [ ] Positive  [x ] All other systems negative  [ ] Unable to assess ROS because ________    Vital Signs Last 24 Hrs  T(C): 36.7 (03-27-18 @ 04:14), Max: 36.7 (03-26-18 @ 13:16)  T(F): 98.1 (03-27-18 @ 04:14), Max: 98.1 (03-26-18 @ 13:16)  HR: 84 (03-27-18 @ 08:34) (84 - 100)  BP: 156/76 (03-27-18 @ 07:40) (156/76 - 176/81)  RR: 20 (03-27-18 @ 08:34) (16 - 20)  SpO2: 98% (03-27-18 @ 08:34) (93% - 100%)    PHYSICAL EXAM:  HEENT:   [x ]Tracheostomy:#6 portex uncuffed  [ ]Pupils equal  [ ]No oral lesions  [ ]Abnormal    SKIN  [x ]No Rash  [ ] Abnormal  [ ] pressure    CARDIAC  [ x]Regular  [ ]Abnormal    PULMONARY  [ x]Bilateral Clear Breath Sounds  [ ]Normal Excursion  [ ]Abnormal    GI  [ x]PEG      [ x] +BS		              [x ]Soft, nondistended, nontender	  [ ]Abnormal    MUSCULOSKELETAL                                   [ ]Bedbound                 [ ]Abnormal    [x ]Ambulatory/OOB to chair                           EXTREMITIES                                         [ x]Normal  [ ]Edema                           NEUROLOGIC  [x ] Normal, non focal  [ ] Focal findings:    PSYCHIATRIC  [x ]Alert and appropriate  [ ] Sedated	 [ ]Agitated    :  Wheeler: [ ] Yes, if yes: Date of Placement:                   [x  ] No    LINES: Central Lines [x ] Yes, if yes: Date of Placement: Date of Placement Right perma cath 3/8 by IR and Mediport- placed as out patient 5/27/17                 [  ] No    HOSPITAL MEDICATIONS:  MEDICATIONS  (STANDING):  ALBUTerol/ipratropium for Nebulization 3 milliLiter(s) Nebulizer every 6 hours  aspirin  chewable 81 milliGRAM(s) Oral daily  calamine Lotion 1 Application(s) Topical daily  dextrose 5%. 1000 milliLiter(s) (50 mL/Hr) IV Continuous <Continuous>  dextrose 50% Injectable 12.5 Gram(s) IV Push once  dextrose 50% Injectable 25 Gram(s) IV Push once  epoetin odalis Injectable 33464 Unit(s) SubCutaneous <User Schedule>  heparin  Injectable 5000 Unit(s) SubCutaneous every 8 hours  insulin lispro (HumaLOG) corrective regimen sliding scale   SubCutaneous every 6 hours  insulin NPH human recombinant 12 Unit(s) SubCutaneous every 6 hours  labetalol 100 milliGRAM(s) Oral two times a day  metoclopramide 10 milliGRAM(s) Oral every 8 hours  nystatin Powder 1 Application(s) Topical two times a day  sodium chloride 0.9%. 500 milliLiter(s) (125 mL/Hr) IV Continuous <Continuous>  sodium chloride 0.9%. 500 milliLiter(s) (125 mL/Hr) IV Continuous <Continuous>  sodium chloride 3%  Inhalation 3 milliLiter(s) Inhalation four times a day    MEDICATIONS  (PRN):  acetaminophen    Suspension. 650 milliGRAM(s) Oral every 6 hours PRN Mild Pain (1 - 3)  chlorhexidine 0.12% Liquid 15 milliLiter(s) Swish and Spit every 4 hours PRN mouth hygiene  glucagon  Injectable 1 milliGRAM(s) IntraMuscular once PRN Glucose LESS THAN 70 milligrams/deciliter  hydrocortisone 1% Ointment 1 Application(s) Topical every 8 hours PRN Rash and/or Itching      LABS:                        8.2    3.2   )-----------( 412      ( 27 Mar 2018 07:36 )             26.9     03-27    138  |  97  |  62<H>  ----------------------------<  175<H>  3.5   |  30  |  2.76<H>    Ca    10.6<H>      27 Mar 2018 07:36  Phos  5.4     03-27  Mg     2.6     03-27              CAPILLARY BLOOD GLUCOSE    MICROBIOLOGY:     RADIOLOGY:  [ ] Reviewed and interpreted by me

## 2018-03-27 NOTE — PROGRESS NOTE ADULT - PROBLEM SELECTOR PLAN 1
- continue to check FSBG Q6h  - NPH 12 units sq q6 (hold if TF are held)  - continue moderate scale q6 - continue to check FSBG Q6h  - NPH 12 units sq q6 (hold if TF are held)  - continue moderate scale q6  - will d/w Dr. Weiner - continue to check FSBG Q6h  - increase NPH from 12 to 13 units sq q6 (hold if TF are held)  - continue moderate scale q6

## 2018-03-27 NOTE — PROGRESS NOTE ADULT - PROBLEM SELECTOR PLAN 1
Known watershed ischemia of the occiput from low flow state during admission 2/2 flu  Currently stable

## 2018-03-27 NOTE — SWALLOW FEES ASSESSMENT ADULT - DIAGNOSTIC IMPRESSIONS
Order received for FEES exam. Case d/w NP Amena and pt and pt's family. Reviewed with all that at the current time - given persistent cognitive deficits, global weakness, persistent dysphonia, and hx of dysphagia with multiple aspiration PNA-> respiratory failure would recommend deferring FEES exam until after a course of rehabilitation with associated improvement in strength, cognition, and voice. Family agrees to same, pt unable to comprehend and team on board with decision.
Pt planned for FEES today however now noted to be re-intubated in MICU and is therefore inappropriate for dysphagia w/u. This service will no longer actively follow, please re-consult if pt deemed clinically appropriate. Above d/w MD: Weil.
Pt presents with a mental status that does not support PO intake at present time. Pt extremely lethargic, unable to achieve or maintain wakefulness for exam despite provision of verbal, tactile, and noxious stimulation, or presentation of PO to labial surface. Given above, further assessment deferred.

## 2018-03-28 DIAGNOSIS — E87.3 ALKALOSIS: ICD-10-CM

## 2018-03-28 DIAGNOSIS — E83.52 HYPERCALCEMIA: ICD-10-CM

## 2018-03-28 LAB
ANION GAP SERPL CALC-SCNC: 9 MMOL/L — SIGNIFICANT CHANGE UP (ref 5–17)
BUN SERPL-MCNC: 67 MG/DL — HIGH (ref 7–23)
CALCIUM SERPL-MCNC: 10.9 MG/DL — HIGH (ref 8.4–10.5)
CHLORIDE SERPL-SCNC: 98 MMOL/L — SIGNIFICANT CHANGE UP (ref 96–108)
CO2 SERPL-SCNC: 32 MMOL/L — HIGH (ref 22–31)
CREAT SERPL-MCNC: 2.59 MG/DL — HIGH (ref 0.5–1.3)
GLUCOSE BLDC GLUCOMTR-MCNC: 166 MG/DL — HIGH (ref 70–99)
GLUCOSE BLDC GLUCOMTR-MCNC: 171 MG/DL — HIGH (ref 70–99)
GLUCOSE BLDC GLUCOMTR-MCNC: 176 MG/DL — HIGH (ref 70–99)
GLUCOSE SERPL-MCNC: 201 MG/DL — HIGH (ref 70–99)
HCT VFR BLD CALC: 25.5 % — LOW (ref 34.5–45)
HGB BLD-MCNC: 8.1 G/DL — LOW (ref 11.5–15.5)
MAGNESIUM SERPL-MCNC: 2.7 MG/DL — HIGH (ref 1.6–2.6)
MCHC RBC-ENTMCNC: 29.3 PG — SIGNIFICANT CHANGE UP (ref 27–34)
MCHC RBC-ENTMCNC: 31.9 GM/DL — LOW (ref 32–36)
MCV RBC AUTO: 91.7 FL — SIGNIFICANT CHANGE UP (ref 80–100)
PHOSPHATE SERPL-MCNC: 6 MG/DL — HIGH (ref 2.5–4.5)
PLATELET # BLD AUTO: 391 K/UL — SIGNIFICANT CHANGE UP (ref 150–400)
POTASSIUM SERPL-MCNC: 3.4 MMOL/L — LOW (ref 3.5–5.3)
POTASSIUM SERPL-SCNC: 3.4 MMOL/L — LOW (ref 3.5–5.3)
RBC # BLD: 2.78 M/UL — LOW (ref 3.8–5.2)
RBC # FLD: 15.6 % — HIGH (ref 10.3–14.5)
SODIUM SERPL-SCNC: 139 MMOL/L — SIGNIFICANT CHANGE UP (ref 135–145)
WBC # BLD: 3.7 K/UL — LOW (ref 3.8–10.5)
WBC # FLD AUTO: 3.7 K/UL — LOW (ref 3.8–10.5)

## 2018-03-28 PROCEDURE — 99232 SBSQ HOSP IP/OBS MODERATE 35: CPT

## 2018-03-28 PROCEDURE — 99233 SBSQ HOSP IP/OBS HIGH 50: CPT | Mod: 25,GC

## 2018-03-28 PROCEDURE — 99497 ADVNCD CARE PLAN 30 MIN: CPT | Mod: GC

## 2018-03-28 PROCEDURE — 99233 SBSQ HOSP IP/OBS HIGH 50: CPT | Mod: GC

## 2018-03-28 RX ORDER — LABETALOL HCL 100 MG
150 TABLET ORAL THREE TIMES A DAY
Qty: 0 | Refills: 0 | Status: DISCONTINUED | OUTPATIENT
Start: 2018-03-28 | End: 2018-03-29

## 2018-03-28 RX ORDER — ERYTHROPOIETIN 10000 [IU]/ML
20000 INJECTION, SOLUTION INTRAVENOUS; SUBCUTANEOUS ONCE
Qty: 0 | Refills: 0 | Status: COMPLETED | OUTPATIENT
Start: 2018-03-28 | End: 2018-03-28

## 2018-03-28 RX ORDER — ERYTHROPOIETIN 10000 [IU]/ML
20000 INJECTION, SOLUTION INTRAVENOUS; SUBCUTANEOUS
Qty: 0 | Refills: 0 | Status: CANCELLED | OUTPATIENT
Start: 2018-04-04 | End: 2018-03-30

## 2018-03-28 RX ORDER — HUMAN INSULIN 100 [IU]/ML
13 INJECTION, SUSPENSION SUBCUTANEOUS EVERY 6 HOURS
Qty: 0 | Refills: 0 | Status: DISCONTINUED | OUTPATIENT
Start: 2018-03-28 | End: 2018-03-30

## 2018-03-28 RX ADMIN — Medication 2: at 17:59

## 2018-03-28 RX ADMIN — SODIUM CHLORIDE 3 MILLILITER(S): 9 INJECTION INTRAMUSCULAR; INTRAVENOUS; SUBCUTANEOUS at 11:30

## 2018-03-28 RX ADMIN — Medication 100 MILLIGRAM(S): at 05:39

## 2018-03-28 RX ADMIN — Medication 10 MILLIGRAM(S): at 13:14

## 2018-03-28 RX ADMIN — SODIUM CHLORIDE 3 MILLILITER(S): 9 INJECTION INTRAMUSCULAR; INTRAVENOUS; SUBCUTANEOUS at 23:40

## 2018-03-28 RX ADMIN — SODIUM CHLORIDE 3 MILLILITER(S): 9 INJECTION INTRAMUSCULAR; INTRAVENOUS; SUBCUTANEOUS at 18:01

## 2018-03-28 RX ADMIN — SODIUM CHLORIDE 3 MILLILITER(S): 9 INJECTION INTRAMUSCULAR; INTRAVENOUS; SUBCUTANEOUS at 00:04

## 2018-03-28 RX ADMIN — HEPARIN SODIUM 5000 UNIT(S): 5000 INJECTION INTRAVENOUS; SUBCUTANEOUS at 05:37

## 2018-03-28 RX ADMIN — Medication 2: at 07:10

## 2018-03-28 RX ADMIN — Medication 2: at 12:52

## 2018-03-28 RX ADMIN — HUMAN INSULIN 13 UNIT(S): 100 INJECTION, SUSPENSION SUBCUTANEOUS at 18:00

## 2018-03-28 RX ADMIN — Medication 81 MILLIGRAM(S): at 11:14

## 2018-03-28 RX ADMIN — Medication 150 MILLIGRAM(S): at 22:28

## 2018-03-28 RX ADMIN — HEPARIN SODIUM 5000 UNIT(S): 5000 INJECTION INTRAVENOUS; SUBCUTANEOUS at 22:28

## 2018-03-28 RX ADMIN — Medication 3 MILLILITER(S): at 23:40

## 2018-03-28 RX ADMIN — SODIUM CHLORIDE 3 MILLILITER(S): 9 INJECTION INTRAMUSCULAR; INTRAVENOUS; SUBCUTANEOUS at 05:12

## 2018-03-28 RX ADMIN — Medication 10 MILLIGRAM(S): at 05:38

## 2018-03-28 RX ADMIN — HEPARIN SODIUM 5000 UNIT(S): 5000 INJECTION INTRAVENOUS; SUBCUTANEOUS at 13:14

## 2018-03-28 RX ADMIN — Medication 3 MILLILITER(S): at 05:11

## 2018-03-28 RX ADMIN — Medication 3 MILLILITER(S): at 18:01

## 2018-03-28 RX ADMIN — Medication 3 MILLILITER(S): at 00:03

## 2018-03-28 RX ADMIN — Medication 3 MILLILITER(S): at 11:30

## 2018-03-28 RX ADMIN — Medication 150 MILLIGRAM(S): at 14:47

## 2018-03-28 RX ADMIN — NYSTATIN CREAM 1 APPLICATION(S): 100000 CREAM TOPICAL at 18:00

## 2018-03-28 RX ADMIN — Medication 10 MILLIGRAM(S): at 22:28

## 2018-03-28 RX ADMIN — HUMAN INSULIN 12 UNIT(S): 100 INJECTION, SUSPENSION SUBCUTANEOUS at 12:52

## 2018-03-28 RX ADMIN — CALAMINE 8% AND ZINC OXIDE 8% 1 APPLICATION(S): 160 LOTION TOPICAL at 11:14

## 2018-03-28 RX ADMIN — ERYTHROPOIETIN 20000 UNIT(S): 10000 INJECTION, SOLUTION INTRAVENOUS; SUBCUTANEOUS at 11:13

## 2018-03-28 RX ADMIN — HUMAN INSULIN 12 UNIT(S): 100 INJECTION, SUSPENSION SUBCUTANEOUS at 05:59

## 2018-03-28 RX ADMIN — NYSTATIN CREAM 1 APPLICATION(S): 100000 CREAM TOPICAL at 05:40

## 2018-03-28 NOTE — PROGRESS NOTE ADULT - ASSESSMENT
67 y/o F with T2DM uncontrolled with neuropathy and ESRD now on HD on insulin, osteoporosis, HTN, here with acute respiratory failure 2/2 influenza s/p CVA and PEA arrest x 2, and MSSA bacteremia on month long tx for aspiration pna, s/p PEG and trach placement. Team recommended increasing NPH yesterday but order never entered. BG goal (100-180mg/dl). Consistently requiring correction coverage on present insulin regimen. Planing for KARLI underway.

## 2018-03-28 NOTE — CHART NOTE - NSCHARTNOTEFT_GEN_A_CORE
Source: Patient [ ]    Family [ x] Pt's  at bedside    other [x ] medical record, RN     Diet : NPO with tube feed    Enteral /Parenteral Nutrition: Nepro 1.8 @ 40ml/hr x 24 hours provides 720 ml, 1,296 calories, 58g protein, 719mL free water (18.5kcla/kg, 8gm protein/kg current weight 69.7Kg)    Pt seen for nutrition follow up. Medical chart reviewed/events noted. Planing for KARLI underway. Pt currently off HD, Phosphorus trending up, per chart plan to start on Renvela 800 mg TID.  Pt's tube feed infusing at goal, 40ml/hr- per RN tolerating. No GI distress, last BM today. Pt's  declined to have any nutrition-related questions/concerns at this time. Pt's family made aware RD remains available.     Current Weight: (3/25) 153.6 pounds, last RD note (3/23) 152.9 pounds - weight stable       Pertinent Medications: MEDICATIONS  (STANDING):  ALBUTerol/ipratropium for Nebulization 3 milliLiter(s) Nebulizer every 6 hours  aspirin  chewable 81 milliGRAM(s) Oral daily  calamine Lotion 1 Application(s) Topical daily  dextrose 5%. 1000 milliLiter(s) (50 mL/Hr) IV Continuous <Continuous>  dextrose 50% Injectable 12.5 Gram(s) IV Push once  dextrose 50% Injectable 25 Gram(s) IV Push once  heparin  Injectable 5000 Unit(s) SubCutaneous every 8 hours  insulin lispro (HumaLOG) corrective regimen sliding scale   SubCutaneous every 6 hours  insulin NPH human recombinant 13 Unit(s) SubCutaneous every 6 hours  labetalol 150 milliGRAM(s) Enteral Tube three times a day  metoclopramide 10 milliGRAM(s) Oral every 8 hours  nystatin Powder 1 Application(s) Topical two times a day  sodium chloride 3%  Inhalation 3 milliLiter(s) Inhalation four times a day    MEDICATIONS  (PRN):  acetaminophen    Suspension. 650 milliGRAM(s) Oral every 6 hours PRN Mild Pain (1 - 3)  chlorhexidine 0.12% Liquid 15 milliLiter(s) Swish and Spit every 4 hours PRN mouth hygiene  glucagon  Injectable 1 milliGRAM(s) IntraMuscular once PRN Glucose LESS THAN 70 milligrams/deciliter  hydrocortisone 1% Ointment 1 Application(s) Topical every 8 hours PRN Rash and/or Itching    Pertinent Labs:  03-28 Na139 mmol/L Glu 201 mg/dL<H> K+ 3.4 mmol/L<L> Cr  2.59 mg/dL<H> BUN 67 mg/dL<H> 03-28 Phos 6.0 mg/dL<H>    Skin: +1 generalized edema, no pressure ulcers     Estimated Needs:   [x ] no change since previous assessment  [ ] recalculated:       Previous Nutrition Diagnosis:  [X] Increased Nutrient Needs (protein/calories)         Nutrition Diagnosis is [ x] ongoing being addressed with enteral nutrition         New Nutrition Diagnosis: [x ] not applicable        Recommend    [ ] Change Diet To:    [ ] Nutrition Supplement    [ x] Nutrition Support: Continue  Nepro 1.8 @ 40ml/hr x 24 hours    [ ] Other:        Monitoring and Evaluation:   1. Continue to monitor tube feed tolerance, weight, labs, and skin integrity. Source: Patient [ ]    Family [ x] Pt's  at bedside    other [x ] medical record, RN     Diet : NPO with tube feed    Enteral /Parenteral Nutrition: Nepro 1.8 @ 40ml/hr x 24 hours provides 720 ml, 1,296 calories, 58g protein, 719mL free water (18.5kcla/kg, 8gm protein/kg current weight 69.7Kg)    Pt seen for nutrition follow up. Medical chart reviewed/events noted. Planing for KARLI underway. Pt currently off HD, Phosphorus trending up, per chart plan to start on Renvela 800 mg TID.  Pt's tube feed infusing at goal, 40ml/hr- per RN tolerating. No GI distress, last BM today. Pt's  declined to have any nutrition-related questions/concerns at this time. Pt's family made aware RD remains available.     Current Weight: (3/25) 153.6 pounds, last RD note (3/23) 152.9 pounds - weight stable       Pertinent Medications: MEDICATIONS  (STANDING):  ALBUTerol/ipratropium for Nebulization 3 milliLiter(s) Nebulizer every 6 hours  aspirin  chewable 81 milliGRAM(s) Oral daily  calamine Lotion 1 Application(s) Topical daily  dextrose 5%. 1000 milliLiter(s) (50 mL/Hr) IV Continuous <Continuous>  dextrose 50% Injectable 12.5 Gram(s) IV Push once  dextrose 50% Injectable 25 Gram(s) IV Push once  heparin  Injectable 5000 Unit(s) SubCutaneous every 8 hours  insulin lispro (HumaLOG) corrective regimen sliding scale   SubCutaneous every 6 hours  insulin NPH human recombinant 13 Unit(s) SubCutaneous every 6 hours  labetalol 150 milliGRAM(s) Enteral Tube three times a day  metoclopramide 10 milliGRAM(s) Oral every 8 hours  nystatin Powder 1 Application(s) Topical two times a day  sodium chloride 3%  Inhalation 3 milliLiter(s) Inhalation four times a day    MEDICATIONS  (PRN):  acetaminophen    Suspension. 650 milliGRAM(s) Oral every 6 hours PRN Mild Pain (1 - 3)  chlorhexidine 0.12% Liquid 15 milliLiter(s) Swish and Spit every 4 hours PRN mouth hygiene  glucagon  Injectable 1 milliGRAM(s) IntraMuscular once PRN Glucose LESS THAN 70 milligrams/deciliter  hydrocortisone 1% Ointment 1 Application(s) Topical every 8 hours PRN Rash and/or Itching    Pertinent Labs:  03-28 Na139 mmol/L Glu 201 mg/dL<H> K+ 3.4 mmol/L<L> Cr  2.59 mg/dL<H> BUN 67 mg/dL<H> 03-28 Phos 6.0 mg/dL<H>  Point of Care Testing BG: (3/28) 171-176  Skin: +1 generalized edema, no pressure ulcers     Estimated Needs:   [x ] no change since previous assessment  [ ] recalculated:       Previous Nutrition Diagnosis:  [X] Increased Nutrient Needs (protein/calories)         Nutrition Diagnosis is [ x] ongoing being addressed with enteral nutrition         New Nutrition Diagnosis: [x ] not applicable        Recommend    [ ] Change Diet To:    [ ] Nutrition Supplement    [ x] Nutrition Support: Continue  Nepro 1.8 @ 40ml/hr x 24 hours    [ ] Other:        Monitoring and Evaluation:   1. Continue to monitor tube feed tolerance, weight, labs, and skin integrity.

## 2018-03-28 NOTE — PROGRESS NOTE ADULT - PROBLEM SELECTOR PLAN 4
Serum CO2 noted to be 32 today. Most likely contraction alkalosis from impaired water intake. Recommend to start on free water supplementation with tube feeds.

## 2018-03-28 NOTE — PROGRESS NOTE ADULT - SUBJECTIVE AND OBJECTIVE BOX
Diabetes Follow up note:  Interval Hx:  67 y/o F with T2DM uncontrolled with neuropathy and ESRD now on HD on insulin, osteoporosis, HTN, here with acute respiratory failure 2/2 influenza s/p CVA and PEA arrest x 2, and MSSA bacteremia on month long tx for aspiration PNA. BG values 160-190's on present insulin regimen. Pt seen at bedside w/ present. Sleepy at time of visit.  endorses pt has been increasingly sleepy past 2-3 days.     Review of Systems:  Pt non-verbal. Opened eyes to tactile stimulation but didn't respond to my question today.   MEDS:    insulin lispro (HumaLOG) corrective regimen sliding scale   SubCutaneous every 6 hours  insulin NPH human recombinant 12 Unit(s) SubCutaneous every 6 hours      Allergies    doxycycline (Rash)  isoniazid (Rash)  NIFEdipine (Urticaria; Hives)  vitamin E (Short breath; Urticaria; Hives)        PE:  General: Female lying in bed. NAD  Vital Signs Last 24 Hrs  T(C): 37.2 (28 Mar 2018 14:00), Max: 37.2 (28 Mar 2018 14:00)  T(F): 99 (28 Mar 2018 14:00), Max: 99 (28 Mar 2018 14:00)  HR: 107 (28 Mar 2018 14:00) (80 - 110)  BP: 181/68 (28 Mar 2018 14:00) (152/70 - 182/69)  BP(mean): --  RR: 18 (28 Mar 2018 04:45) (16 - 18)  SpO2: 99% (28 Mar 2018 14:00) (94% - 100%)  HEENT: Trach in place. on trach collar  Resp: CTA b/l. No accessory muscle use.   Abd: Soft, NT,ND, PEG in place.   Extremities: Warm      LABS:    POCT Blood Glucose.: 171 mg/dL (03-28-18 @ 12:42)  POCT Blood Glucose.: 176 mg/dL (03-28-18 @ 05:56)  POCT Blood Glucose.: 192 mg/dL (03-27-18 @ 23:35)  POCT Blood Glucose.: 190 mg/dL (03-27-18 @ 17:45)  POCT Blood Glucose.: 193 mg/dL (03-27-18 @ 11:47)  POCT Blood Glucose.: 165 mg/dL (03-27-18 @ 06:17)  POCT Blood Glucose.: 117 mg/dL (03-26-18 @ 23:41)  POCT Blood Glucose.: 149 mg/dL (03-26-18 @ 17:43)  POCT Blood Glucose.: 184 mg/dL (03-26-18 @ 12:10)  POCT Blood Glucose.: 152 mg/dL (03-26-18 @ 06:53)  POCT Blood Glucose.: 133 mg/dL (03-26-18 @ 05:40)  POCT Blood Glucose.: 178 mg/dL (03-25-18 @ 23:37)  POCT Blood Glucose.: 166 mg/dL (03-25-18 @ 17:44)                            8.1    3.7   )-----------( 391      ( 28 Mar 2018 06:27 )             25.5       03-28    139  |  98  |  67<H>  ----------------------------<  201<H>  3.4<L>   |  32<H>  |  2.59<H>    Ca    10.9<H>      28 Mar 2018 06:27  Phos  6.0     03-28  Mg     2.7     03-28          Hemoglobin A1C, Whole Blood: 8.6 % <H> [4.0 - 5.6] (01-31-18 @ 07:15)  Hemoglobin A1C, Whole Blood: 8.7 % <H> [4.0 - 5.6] (01-31-18 @ 05:51)            Contact number: marcelino [phone] or [phone]

## 2018-03-28 NOTE — CHART NOTE - NSCHARTNOTESELECT_GEN_ALL_CORE
Nutrition Services
Event Note
Event Note/Medicine
Event Note/RRT Note
Event Note/RRT Note
MICU Accept/Transfer Note
MICU/Transfer Note
Medicine/Event Note
Medicine/Event Note
Nutrition Follow-Up/Nutrition Services
Nutrition Services
POCUS
POCUS Note
Percutaneous Tracheostomy
Rapid Response/MAR
Transfer Note
Transfer Note/MICU to RCU
US Note
thoracic/Off Service Note

## 2018-03-28 NOTE — PROGRESS NOTE ADULT - ATTENDING COMMENTS
68 yoF with breast cancer, HTN who p/w flu, went into respiratory arrest, then PEA arrest, s/p chest tube, TPA and intubation. Course c/b shock liver, RUSS, pneumoperitoneum, and elevated cardiac enzymes. Chest tubes and peritoneal tubes removed, and shock liver now resolved. Now with Staph aureus bacteremia. Now s/p PEG/trach.      RUSS: Likely ischemic ATN in the setting of shock/sepsis   Serum creat better.  would continue to check serial bladder scans today to ensure she is not retaining   Hold dialysis    If renal function continues to improve over the next 1-2 days, she can be discharged to a facility with no Dialysis  Access: PC present    Marginal Hypercalcemia: noted  likely from immobilization  Check intact PTH    Anemia: Last dose of DAVID was last week  please recheck iron studies  Start aranesp 40 mcg SQ Q WEEKLY    Vol/HTN: BP stable . 68 yoF with breast cancer, HTN who p/w flu, went into respiratory arrest, then PEA arrest, s/p chest tube, TPA and intubation. Course c/b shock liver, RUSS, pneumoperitoneum, and elevated cardiac enzymes. Chest tubes and peritoneal tubes removed, and shock liver now resolved. Now with Staph aureus bacteremia. Now s/p PEG/trach.      RUSS: Likely ischemic ATN in the setting of shock/sepsis  Serum creat better, although BUN up  Is urinating although exact volume not known  No indication for  dialysis    She can be discharged to a facility with no Dialysis  Access: PC present., will keep in place for now as her clinical status remains tenuous. Would need close follow up of blood work once discharged and if renal function is stable over the course of the next month, the PC can be removed at that time     Marginal Hypercalcemia: noted  likely from immobilization and also with H/O Primary Hyperparathryoidism  F/U intact PTH  Start water via Peg  Also renvela TID    Anemia: Last dose of DAVID was last week  please recheck iron studies  Start  procrit  SQ Q WEEKLY    Vol/HTN: BP stable .

## 2018-03-28 NOTE — PROGRESS NOTE ADULT - PROBLEM SELECTOR PLAN 1
from ATN in setting of cardiac arrest. Patient s/p tunneled HD catheter placement by IR. SCr stable at 2.59 today with elevated BUN. No indication for HD at this time. Can discharge to rehab center from nephrology point of view; will need to check BMP every week at rehab center to monitor renal function. Recommend to keep tunneled HD catheter in for now; can remove as oupatient if renal function is stable for at least 1 month.

## 2018-03-28 NOTE — PROGRESS NOTE ADULT - PROBLEM SELECTOR PLAN 1
-test BG Q6h  -Increase NPH 13 units Q6h (hold if tube feeds off or BG less than 100mg/dl)  -c/w Humalog moderate correction scale Q6h  -can likely be discharged to rehab on present insulin regimen if BG values remain at goal  -discussed w/pt's  and team  pager: [phone]

## 2018-03-28 NOTE — PROGRESS NOTE ADULT - PROBLEM SELECTOR PLAN 3
Serum calcium noted to be elevated. PTH is noted to be elevated which indicates primary hyperparathyroidism. Will continue to monitor serum calcium for now. Serum calcium noted to be elevated from underlying hyperparathyroidism. Will continue to monitor serum calcium for now.

## 2018-03-28 NOTE — PROGRESS NOTE ADULT - ASSESSMENT
67yoF with breast cancer, HTN who p/w flu, went into respiratory arrest, then PEA arrest, s/p chest tube, TPA and intubation. Course c/b shock liver, RUSS, pneumoperitoneum, and elevated cardiac enzymes. Chest tubes and peritoneal tubes removed, and shock liver now resolved. Developed Staph aureus bacteremia. Now s/p trach and PEG. Patient currently off HD.

## 2018-03-28 NOTE — PROGRESS NOTE ADULT - PROBLEM SELECTOR PLAN 5
Serum phosphorus noted to be elevated at 6. Recommend to start on Renvela 800 mg TID. Monitor serum phosphorus levels.

## 2018-03-28 NOTE — PROGRESS NOTE ADULT - SUBJECTIVE AND OBJECTIVE BOX
Patient is a 68y old  Female who presents with a chief complaint of Fever, total body weakness (02 Feb 2018 13:44)      Interval Events:    REVIEW OF SYSTEMS:  [ ] Positive  [x ] All other systems negative  [ ] Unable to assess ROS because ________    Vital Signs Last 24 Hrs  T(C): 36.8 (03-28-18 @ 04:45), Max: 36.8 (03-27-18 @ 20:24)  T(F): 98.3 (03-28-18 @ 04:45), Max: 98.3 (03-28-18 @ 04:45)  HR: 105 (03-28-18 @ 05:12) (80 - 110)  BP: 177/72 (03-28-18 @ 04:45) (152/70 - 182/69)  RR: 18 (03-28-18 @ 04:45) (16 - 20)  SpO2: 95% (03-28-18 @ 05:12) (94% - 100%)    PHYSICAL EXAM:  HEENT:   [x ]Tracheostomy: 6 shiley uncuffed  [ ]Pupils equal  [ ]No oral lesions  [ ]Abnormal    SKIN  [x ]No Rash  [ ] Abnormal  [ ] pressure    CARDIAC  [x ]Regular  [ ]Abnormal    PULMONARY  [x ]Bilateral Clear Breath Sounds  [ ]Normal Excursion  [ ]Abnormal    GI  [x ]PEG      [x ] +BS		              [x ]Soft, nondistended, nontender	  [ ]Abnormal    MUSCULOSKELETAL                                   [ ]Bedbound                 [ ]Abnormal    [x ]Ambulatory/OOB to chair                           EXTREMITIES                                         [ ]Normal  [ ]Edema                           NEUROLOGIC  [x ] Normal, non focal  [ ] Focal findings:    PSYCHIATRIC  [x ]Alert and appropriate  [ ] Sedated	 [ ]Agitated    :  Wheeler: [ ] Yes, if yes: Date of Placement:                   [ x ] No    LINES: Central Lines [x ] Yes, if yes: Date of Placement: Date of Placement Right perma cath 3/8 by IR and [facility]- placed as out patient 5/27/17                                    [  ] No    HOSPITAL MEDICATIONS:  MEDICATIONS  (STANDING):  ALBUTerol/ipratropium for Nebulization 3 milliLiter(s) Nebulizer every 6 hours  aspirin  chewable 81 milliGRAM(s) Oral daily  calamine Lotion 1 Application(s) Topical daily  dextrose 5%. 1000 milliLiter(s) (50 mL/Hr) IV Continuous <Continuous>  dextrose 50% Injectable 12.5 Gram(s) IV Push once  dextrose 50% Injectable 25 Gram(s) IV Push once  epoetin odalis Injectable 55262 Unit(s) SubCutaneous <User Schedule>  heparin  Injectable 5000 Unit(s) SubCutaneous every 8 hours  insulin lispro (HumaLOG) corrective regimen sliding scale   SubCutaneous every 6 hours  insulin NPH human recombinant 12 Unit(s) SubCutaneous every 6 hours  labetalol 100 milliGRAM(s) Oral two times a day  metoclopramide 10 milliGRAM(s) Oral every 8 hours  nystatin Powder 1 Application(s) Topical two times a day  sodium chloride 0.9%. 500 milliLiter(s) (125 mL/Hr) IV Continuous <Continuous>  sodium chloride 0.9%. 500 milliLiter(s) (125 mL/Hr) IV Continuous <Continuous>  sodium chloride 3%  Inhalation 3 milliLiter(s) Inhalation four times a day    MEDICATIONS  (PRN):  acetaminophen    Suspension. 650 milliGRAM(s) Oral every 6 hours PRN Mild Pain (1 - 3)  chlorhexidine 0.12% Liquid 15 milliLiter(s) Swish and Spit every 4 hours PRN mouth hygiene  glucagon  Injectable 1 milliGRAM(s) IntraMuscular once PRN Glucose LESS THAN 70 milligrams/deciliter  hydrocortisone 1% Ointment 1 Application(s) Topical every 8 hours PRN Rash and/or Itching      LABS:                        8.1    3.7   )-----------( 391      ( 28 Mar 2018 06:27 )             25.5     03-28    139  |  98  |  67<H>  ----------------------------<  201<H>  3.4<L>   |  32<H>  |  2.59<H>    Ca    10.9<H>      28 Mar 2018 06:27  Phos  6.0     03-28  Mg     2.7     03-28              CAPILLARY BLOOD GLUCOSE    MICROBIOLOGY:     RADIOLOGY:  [ ] Reviewed and interpreted by me Patient is a 68y old  Female who presents with a chief complaint of Fever, total body weakness (02 Feb 2018 13:44)      Interval Events: No acute events overnight    REVIEW OF SYSTEMS:  [ ] Positive  [x ] All other systems negative  [ ] Unable to assess ROS because ________    Vital Signs Last 24 Hrs  T(C): 36.8 (03-28-18 @ 04:45), Max: 36.8 (03-27-18 @ 20:24)  T(F): 98.3 (03-28-18 @ 04:45), Max: 98.3 (03-28-18 @ 04:45)  HR: 105 (03-28-18 @ 05:12) (80 - 110)  BP: 177/72 (03-28-18 @ 04:45) (152/70 - 182/69)  RR: 18 (03-28-18 @ 04:45) (16 - 20)  SpO2: 95% (03-28-18 @ 05:12) (94% - 100%)    PHYSICAL EXAM:  HEENT:   [x ]Tracheostomy: 6 shiley uncuffed  [x ]Pupils equal  [x ]No oral lesions  [ ]Abnormal    SKIN  [x ]No Rash  [ ] Abnormal  [ ] pressure    CARDIAC  [x ]Regular  [ ]Abnormal    PULMONARY  [x ]Bilateral Clear Breath Sounds  [ ]Normal Excursion  [ ]Abnormal    GI  [x ]PEG      [x ] +BS		              [x ]Soft, nondistended, nontender	  [ ]Abnormal    MUSCULOSKELETAL                                   [ ]Bedbound                 [ ]Abnormal    [x ]Ambulatory/OOB to chair                           EXTREMITIES                                         [ ]Normal  [ ]Edema                           NEUROLOGIC  [x ] Normal, non focal  [ ] Focal findings:    PSYCHIATRIC  [x ]Alert and appropriate  [ ] Sedated	 [ ]Agitated    :  Wheeler: [ ] Yes, if yes: Date of Placement:                   [ x ] No    LINES: Central Lines [x ] Yes, if yes: Date of Placement: Date of Placement Right perma cath 3/8 by IR and [facility]- placed as out patient 5/27/17                                    [  ] No    HOSPITAL MEDICATIONS:  MEDICATIONS  (STANDING):  ALBUTerol/ipratropium for Nebulization 3 milliLiter(s) Nebulizer every 6 hours  aspirin  chewable 81 milliGRAM(s) Oral daily  calamine Lotion 1 Application(s) Topical daily  dextrose 5%. 1000 milliLiter(s) (50 mL/Hr) IV Continuous <Continuous>  dextrose 50% Injectable 12.5 Gram(s) IV Push once  dextrose 50% Injectable 25 Gram(s) IV Push once  epoetin odalis Injectable 70972 Unit(s) SubCutaneous <User Schedule>  heparin  Injectable 5000 Unit(s) SubCutaneous every 8 hours  insulin lispro (HumaLOG) corrective regimen sliding scale   SubCutaneous every 6 hours  insulin NPH human recombinant 12 Unit(s) SubCutaneous every 6 hours  labetalol 100 milliGRAM(s) Oral two times a day  metoclopramide 10 milliGRAM(s) Oral every 8 hours  nystatin Powder 1 Application(s) Topical two times a day  sodium chloride 0.9%. 500 milliLiter(s) (125 mL/Hr) IV Continuous <Continuous>  sodium chloride 0.9%. 500 milliLiter(s) (125 mL/Hr) IV Continuous <Continuous>  sodium chloride 3%  Inhalation 3 milliLiter(s) Inhalation four times a day    MEDICATIONS  (PRN):  acetaminophen    Suspension. 650 milliGRAM(s) Oral every 6 hours PRN Mild Pain (1 - 3)  chlorhexidine 0.12% Liquid 15 milliLiter(s) Swish and Spit every 4 hours PRN mouth hygiene  glucagon  Injectable 1 milliGRAM(s) IntraMuscular once PRN Glucose LESS THAN 70 milligrams/deciliter  hydrocortisone 1% Ointment 1 Application(s) Topical every 8 hours PRN Rash and/or Itching      LABS:                        8.1    3.7   )-----------( 391      ( 28 Mar 2018 06:27 )             25.5     03-28    139  |  98  |  67<H>  ----------------------------<  201<H>  3.4<L>   |  32<H>  |  2.59<H>    Ca    10.9<H>      28 Mar 2018 06:27  Phos  6.0     03-28  Mg     2.7     03-28              CAPILLARY BLOOD GLUCOSE    MICROBIOLOGY:     RADIOLOGY:  [ ] Reviewed and interpreted by me

## 2018-03-28 NOTE — PROGRESS NOTE ADULT - SUBJECTIVE AND OBJECTIVE BOX
[facility] Division of Kidney Diseases & Hypertension  FOLLOW UP NOTE  [phone]--------------------------------------------------------------------------------  Chief Complaint:RUSS      24 hour events/subjective:    No acute events noted    PAST HISTORY  --------------------------------------------------------------------------------  No significant changes to PMH, PSH, FHx, SHx, unless otherwise noted    ALLERGIES & MEDICATIONS  --------------------------------------------------------------------------------  Allergies    doxycycline (Rash)  isoniazid (Rash)  NIFEdipine (Urticaria; Hives)  vitamin E (Short breath; Urticaria; Hives)    Intolerances      Standing Inpatient Medications  ALBUTerol/ipratropium for Nebulization 3 milliLiter(s) Nebulizer every 6 hours  aspirin  chewable 81 milliGRAM(s) Oral daily  calamine Lotion 1 Application(s) Topical daily  dextrose 5%. 1000 milliLiter(s) IV Continuous <Continuous>  dextrose 50% Injectable 12.5 Gram(s) IV Push once  dextrose 50% Injectable 25 Gram(s) IV Push once  heparin  Injectable 5000 Unit(s) SubCutaneous every 8 hours  insulin lispro (HumaLOG) corrective regimen sliding scale   SubCutaneous every 6 hours  insulin NPH human recombinant 12 Unit(s) SubCutaneous every 6 hours  labetalol 100 milliGRAM(s) Oral two times a day  metoclopramide 10 milliGRAM(s) Oral every 8 hours  nystatin Powder 1 Application(s) Topical two times a day  sodium chloride 0.9%. 500 milliLiter(s) IV Continuous <Continuous>  sodium chloride 0.9%. 500 milliLiter(s) IV Continuous <Continuous>  sodium chloride 3%  Inhalation 3 milliLiter(s) Inhalation four times a day    PRN Inpatient Medications  acetaminophen    Suspension. 650 milliGRAM(s) Oral every 6 hours PRN  chlorhexidine 0.12% Liquid 15 milliLiter(s) Swish and Spit every 4 hours PRN  glucagon  Injectable 1 milliGRAM(s) IntraMuscular once PRN  hydrocortisone 1% Ointment 1 Application(s) Topical every 8 hours PRN      REVIEW OF SYSTEMS  --------------------------------------------------------------------------------  Unable to obtain.     VITALS/PHYSICAL EXAM  --------------------------------------------------------------------------------  T(C): 36.8 (03-28-18 @ 04:45), Max: 36.8 (03-27-18 @ 20:24)  HR: 95 (03-28-18 @ 11:32) (80 - 110)  BP: 177/72 (03-28-18 @ 04:45) (152/70 - 182/69)  RR: 18 (03-28-18 @ 04:45) (16 - 18)  SpO2: 100% (03-28-18 @ 11:32) (94% - 100%)  Wt(kg): --        03-27-18 @ 07:01  -  03-28-18 @ 07:00  --------------------------------------------------------  IN: 400 mL / OUT: 0 mL / NET: 400 mL      Physical Exam:  	  Gen:+awake, NAD   	HEENT:+Trach  	Pulm: CTA B/L  	CV: RRR, S1S2; no rub  	Abd: +BS, soft, nontender/nondistended, + PEG  	LE: Warm, FROM, no clubbing, intact strength; no edema  	Skin: Warm, without rashes  	Vascular access: Rt chest PC      LABS/STUDIES  --------------------------------------------------------------------------------              8.1    3.7   >-----------<  391      [03-28-18 @ 06:27]              25.5     139  |  98  |  67  ----------------------------<  201      [03-28-18 @ 06:27]  3.4   |  32  |  2.59        Ca     10.9     [03-28-18 @ 06:27]      Mg     2.7     [03-28-18 @ 06:27]      Phos  6.0     [03-28-18 @ 06:27]            Creatinine Trend:  SCr 2.59 [03-28 @ 06:27]  SCr 2.76 [03-27 @ 07:36]  SCr 3.02 [03-26 @ 06:49]  SCr 3.06 [03-25 @ 06:34]  SCr 2.93 [03-24 @ 06:49]          HBsAb Nonreact      [03-23-18 @ 07:58]  HBsAg Nonreact      [03-23-18 @ 07:58]  HBcAb Nonreact      [03-23-18 @ 07:58]  HCV 0.24, Nonreact      [03-23-18 @ 07:58]

## 2018-03-28 NOTE — PROGRESS NOTE ADULT - ATTENDING COMMENTS
Pt seen and examined, agree with above. 67 F with HTN, DM 2, obesity, breast CA now with prolonged hospital course due to acute resp failure with hypoxia 2/2 influenza and bacterial pneumonia, PEA arrest x 2, bilateral pneumothoraces, septic shock, pre-renal ATN requiring HD, CVA, PE s/p tPA. Now s/p trach/ PEG. Stable on TC, phonating with PMV. Cont pulm toilet. Oropharyngeal dysphagia tolerating PEG feeds. Awaiting repeat swallow evalv. ATN with improving renal function, currently labs stable off HD. Will cont to monitor UO and Cr/lytes. Will require outpt nephrology FUP with serial labs upon discharge to follow renal function. Will leave permcath in place for now as her Cr is still in the mid 2 range. Can be removed as oupt once renal function improves.  updated at bedside and his questions were answered in detail. Pt remains full code. Advanced care planning time spent: 35 mins

## 2018-03-29 LAB
ANION GAP SERPL CALC-SCNC: 10 MMOL/L — SIGNIFICANT CHANGE UP (ref 5–17)
BUN SERPL-MCNC: 67 MG/DL — HIGH (ref 7–23)
CALCIUM SERPL-MCNC: 10.3 MG/DL — SIGNIFICANT CHANGE UP (ref 8.4–10.5)
CALCIUM SERPL-MCNC: 10.4 MG/DL — SIGNIFICANT CHANGE UP (ref 8.4–10.5)
CHLORIDE SERPL-SCNC: 94 MMOL/L — LOW (ref 96–108)
CO2 SERPL-SCNC: 33 MMOL/L — HIGH (ref 22–31)
CREAT SERPL-MCNC: 2.31 MG/DL — HIGH (ref 0.5–1.3)
FERRITIN SERPL-MCNC: 74 NG/ML — SIGNIFICANT CHANGE UP (ref 15–150)
GLUCOSE BLDC GLUCOMTR-MCNC: 124 MG/DL — HIGH (ref 70–99)
GLUCOSE BLDC GLUCOMTR-MCNC: 134 MG/DL — HIGH (ref 70–99)
GLUCOSE BLDC GLUCOMTR-MCNC: 149 MG/DL — HIGH (ref 70–99)
GLUCOSE BLDC GLUCOMTR-MCNC: 150 MG/DL — HIGH (ref 70–99)
GLUCOSE BLDC GLUCOMTR-MCNC: 170 MG/DL — HIGH (ref 70–99)
GLUCOSE SERPL-MCNC: 185 MG/DL — HIGH (ref 70–99)
HCT VFR BLD CALC: 26 % — LOW (ref 34.5–45)
HGB BLD-MCNC: 8.3 G/DL — LOW (ref 11.5–15.5)
IRON SATN MFR SERPL: 12 % — LOW (ref 14–50)
IRON SATN MFR SERPL: 35 UG/DL — SIGNIFICANT CHANGE UP (ref 30–160)
MAGNESIUM SERPL-MCNC: 2.6 MG/DL — SIGNIFICANT CHANGE UP (ref 1.6–2.6)
MCHC RBC-ENTMCNC: 29.3 PG — SIGNIFICANT CHANGE UP (ref 27–34)
MCHC RBC-ENTMCNC: 32.1 GM/DL — SIGNIFICANT CHANGE UP (ref 32–36)
MCV RBC AUTO: 91.4 FL — SIGNIFICANT CHANGE UP (ref 80–100)
PHOSPHATE SERPL-MCNC: 5.5 MG/DL — HIGH (ref 2.5–4.5)
PLATELET # BLD AUTO: 394 K/UL — SIGNIFICANT CHANGE UP (ref 150–400)
POTASSIUM SERPL-MCNC: 3.3 MMOL/L — LOW (ref 3.5–5.3)
POTASSIUM SERPL-SCNC: 3.3 MMOL/L — LOW (ref 3.5–5.3)
PTH-INTACT FLD-MCNC: 9 PG/ML — LOW (ref 15–65)
RBC # BLD: 2.84 M/UL — LOW (ref 3.8–5.2)
RBC # FLD: 15.6 % — HIGH (ref 10.3–14.5)
SODIUM SERPL-SCNC: 137 MMOL/L — SIGNIFICANT CHANGE UP (ref 135–145)
TIBC SERPL-MCNC: 301 UG/DL — SIGNIFICANT CHANGE UP (ref 220–430)
UIBC SERPL-MCNC: 266 UG/DL — SIGNIFICANT CHANGE UP (ref 110–370)
WBC # BLD: 3.9 K/UL — SIGNIFICANT CHANGE UP (ref 3.8–10.5)
WBC # FLD AUTO: 3.9 K/UL — SIGNIFICANT CHANGE UP (ref 3.8–10.5)

## 2018-03-29 PROCEDURE — 99233 SBSQ HOSP IP/OBS HIGH 50: CPT

## 2018-03-29 PROCEDURE — 99497 ADVNCD CARE PLAN 30 MIN: CPT | Mod: GC

## 2018-03-29 PROCEDURE — 99233 SBSQ HOSP IP/OBS HIGH 50: CPT | Mod: 25,GC

## 2018-03-29 RX ORDER — CARVEDILOL PHOSPHATE 80 MG/1
1 CAPSULE, EXTENDED RELEASE ORAL
Qty: 0 | Refills: 0 | COMMUNITY

## 2018-03-29 RX ORDER — IPRATROPIUM/ALBUTEROL SULFATE 18-103MCG
3 AEROSOL WITH ADAPTER (GRAM) INHALATION
Qty: 0 | Refills: 0 | COMMUNITY
Start: 2018-03-29

## 2018-03-29 RX ORDER — CALAMINE AND ZINC OXIDE AND PHENOL 160; 10 MG/ML; MG/ML
1 LOTION TOPICAL
Qty: 0 | Refills: 0 | COMMUNITY
Start: 2018-03-29

## 2018-03-29 RX ORDER — ACETAMINOPHEN 500 MG
20.31 TABLET ORAL
Qty: 0 | Refills: 0 | COMMUNITY
Start: 2018-03-29

## 2018-03-29 RX ORDER — ERYTHROPOIETIN 10000 [IU]/ML
20000 INJECTION, SOLUTION INTRAVENOUS; SUBCUTANEOUS
Qty: 0 | Refills: 0 | COMMUNITY
Start: 2018-03-29

## 2018-03-29 RX ORDER — LETROZOLE 2.5 MG/1
1 TABLET, FILM COATED ORAL
Qty: 0 | Refills: 0 | COMMUNITY

## 2018-03-29 RX ORDER — ASPIRIN/CALCIUM CARB/MAGNESIUM 324 MG
1 TABLET ORAL
Qty: 0 | Refills: 0 | COMMUNITY
Start: 2018-03-29

## 2018-03-29 RX ORDER — LABETALOL HCL 100 MG
250 TABLET ORAL THREE TIMES A DAY
Qty: 0 | Refills: 0 | Status: DISCONTINUED | OUTPATIENT
Start: 2018-03-29 | End: 2018-03-30

## 2018-03-29 RX ORDER — METOCLOPRAMIDE HCL 10 MG
1 TABLET ORAL
Qty: 0 | Refills: 0 | COMMUNITY
Start: 2018-03-29

## 2018-03-29 RX ORDER — FUROSEMIDE 40 MG
1 TABLET ORAL
Qty: 0 | Refills: 0 | COMMUNITY

## 2018-03-29 RX ORDER — POTASSIUM CHLORIDE 20 MEQ
40 PACKET (EA) ORAL ONCE
Qty: 0 | Refills: 0 | Status: COMPLETED | OUTPATIENT
Start: 2018-03-29 | End: 2018-03-29

## 2018-03-29 RX ORDER — HUMAN INSULIN 100 [IU]/ML
13 INJECTION, SUSPENSION SUBCUTANEOUS
Qty: 0 | Refills: 0 | COMMUNITY
Start: 2018-03-29

## 2018-03-29 RX ORDER — LABETALOL HCL 100 MG
250 TABLET ORAL
Qty: 0 | Refills: 0 | COMMUNITY
Start: 2018-03-29

## 2018-03-29 RX ORDER — ASPIRIN/CALCIUM CARB/MAGNESIUM 324 MG
1 TABLET ORAL
Qty: 0 | Refills: 0 | COMMUNITY

## 2018-03-29 RX ORDER — HYDROCORTISONE 1 %
1 OINTMENT (GRAM) TOPICAL
Qty: 0 | Refills: 0 | COMMUNITY
Start: 2018-03-29

## 2018-03-29 RX ORDER — HEPARIN SODIUM 5000 [USP'U]/ML
5000 INJECTION INTRAVENOUS; SUBCUTANEOUS
Qty: 0 | Refills: 0 | COMMUNITY
Start: 2018-03-29

## 2018-03-29 RX ORDER — RAMIPRIL 5 MG
1 CAPSULE ORAL
Qty: 0 | Refills: 0 | COMMUNITY

## 2018-03-29 RX ORDER — METFORMIN HYDROCHLORIDE 850 MG/1
1 TABLET ORAL
Qty: 0 | Refills: 0 | COMMUNITY

## 2018-03-29 RX ORDER — NYSTATIN CREAM 100000 [USP'U]/G
1 CREAM TOPICAL
Qty: 0 | Refills: 0 | COMMUNITY
Start: 2018-03-29

## 2018-03-29 RX ORDER — ATORVASTATIN CALCIUM 80 MG/1
1 TABLET, FILM COATED ORAL
Qty: 0 | Refills: 0 | COMMUNITY

## 2018-03-29 RX ORDER — INSULIN NPH HUM/REG INSULIN HM 70-30/ML
0 VIAL (ML) SUBCUTANEOUS
Qty: 0 | Refills: 0 | COMMUNITY

## 2018-03-29 RX ADMIN — HEPARIN SODIUM 5000 UNIT(S): 5000 INJECTION INTRAVENOUS; SUBCUTANEOUS at 05:18

## 2018-03-29 RX ADMIN — HEPARIN SODIUM 5000 UNIT(S): 5000 INJECTION INTRAVENOUS; SUBCUTANEOUS at 21:45

## 2018-03-29 RX ADMIN — Medication 250 MILLIGRAM(S): at 21:45

## 2018-03-29 RX ADMIN — Medication 10 MILLIGRAM(S): at 05:18

## 2018-03-29 RX ADMIN — Medication 3 MILLILITER(S): at 17:27

## 2018-03-29 RX ADMIN — SODIUM CHLORIDE 3 MILLILITER(S): 9 INJECTION INTRAMUSCULAR; INTRAVENOUS; SUBCUTANEOUS at 17:27

## 2018-03-29 RX ADMIN — Medication 150 MILLIGRAM(S): at 05:18

## 2018-03-29 RX ADMIN — HUMAN INSULIN 13 UNIT(S): 100 INJECTION, SUSPENSION SUBCUTANEOUS at 23:57

## 2018-03-29 RX ADMIN — HEPARIN SODIUM 5000 UNIT(S): 5000 INJECTION INTRAVENOUS; SUBCUTANEOUS at 14:36

## 2018-03-29 RX ADMIN — Medication 81 MILLIGRAM(S): at 12:43

## 2018-03-29 RX ADMIN — Medication 3 MILLILITER(S): at 12:17

## 2018-03-29 RX ADMIN — HUMAN INSULIN 13 UNIT(S): 100 INJECTION, SUSPENSION SUBCUTANEOUS at 12:43

## 2018-03-29 RX ADMIN — Medication 10 MILLIGRAM(S): at 14:36

## 2018-03-29 RX ADMIN — CHLORHEXIDINE GLUCONATE 15 MILLILITER(S): 213 SOLUTION TOPICAL at 05:18

## 2018-03-29 RX ADMIN — Medication 40 MILLIEQUIVALENT(S): at 09:55

## 2018-03-29 RX ADMIN — HUMAN INSULIN 13 UNIT(S): 100 INJECTION, SUSPENSION SUBCUTANEOUS at 00:53

## 2018-03-29 RX ADMIN — HUMAN INSULIN 13 UNIT(S): 100 INJECTION, SUSPENSION SUBCUTANEOUS at 18:21

## 2018-03-29 RX ADMIN — NYSTATIN CREAM 1 APPLICATION(S): 100000 CREAM TOPICAL at 05:18

## 2018-03-29 RX ADMIN — HUMAN INSULIN 13 UNIT(S): 100 INJECTION, SUSPENSION SUBCUTANEOUS at 06:59

## 2018-03-29 RX ADMIN — SODIUM CHLORIDE 3 MILLILITER(S): 9 INJECTION INTRAMUSCULAR; INTRAVENOUS; SUBCUTANEOUS at 12:17

## 2018-03-29 RX ADMIN — NYSTATIN CREAM 1 APPLICATION(S): 100000 CREAM TOPICAL at 18:22

## 2018-03-29 RX ADMIN — Medication 3 MILLILITER(S): at 05:24

## 2018-03-29 RX ADMIN — Medication 250 MILLIGRAM(S): at 14:36

## 2018-03-29 RX ADMIN — CALAMINE 8% AND ZINC OXIDE 8% 1 APPLICATION(S): 160 LOTION TOPICAL at 12:43

## 2018-03-29 RX ADMIN — Medication 10 MILLIGRAM(S): at 21:45

## 2018-03-29 RX ADMIN — Medication 2: at 06:59

## 2018-03-29 RX ADMIN — SODIUM CHLORIDE 3 MILLILITER(S): 9 INJECTION INTRAMUSCULAR; INTRAVENOUS; SUBCUTANEOUS at 05:25

## 2018-03-29 NOTE — PROGRESS NOTE ADULT - PROBLEM SELECTOR PLAN 8
feed rate per nutritionist 40 cc /h. tolerating well    continue current plan. Will plan for swallow test when patient has developed more strength through rehab

## 2018-03-29 NOTE — PROGRESS NOTE ADULT - SUBJECTIVE AND OBJECTIVE BOX
Patient is a 68y old  Female who presents with a chief complaint of Fever, total body weakness (02 Feb 2018 13:44)      Interval Events:    REVIEW OF SYSTEMS:  [ ] Positive  [ ] All other systems negative  [ ] Unable to assess ROS because ________    Vital Signs Last 24 Hrs  T(C): 36.4 (03-29-18 @ 05:28), Max: 37.2 (03-28-18 @ 14:00)  T(F): 97.6 (03-29-18 @ 05:28), Max: 99 (03-28-18 @ 14:00)  HR: 88 (03-29-18 @ 08:45) (80 - 107)  BP: 156/65 (03-29-18 @ 06:00) (155/75 - 181/71)  RR: 20 (03-29-18 @ 08:45) (18 - 21)  SpO2: 96% (03-29-18 @ 08:45) (96% - 100%)    PHYSICAL EXAM:  HEENT:   [x ]Tracheostomy: 6 portex uncuffed  [ ]Pupils equal  [ ]No oral lesions  [ ]Abnormal    SKIN  [ ]No Rash  [x ] Abnormal-dry irritated skin  [ ] pressure    CARDIAC  [x ]Regular  [ ]Abnormal    PULMONARY  [ x]Bilateral Clear Breath Sounds diminished to bases  [ ]Normal Excursion  [ ]Abnormal    GI  [x ]PEG      [ x] +BS		              [ x]Soft, nondistended, nontender	  [ ]Abnormal    MUSCULOSKELETAL                                   [ ]Bedbound                 [ ]Abnormal    [ ]Ambulatory/OOB to chair                           EXTREMITIES                                         [x ]Normal  [ ]Edema                           NEUROLOGIC  [ x] Normal, non focal  [ ] Focal findings:    PSYCHIATRIC  [ x]Alert and appropriate  [ ] Sedated	 [ ]Agitated    :  Wheeler: [ ] Yes, if yes: Date of Placement:                   [ x ] No    LINES: Central Lines [x ] Yes, if yes: Date of Placement: Right perma cath 3/8 by IR and [facility]- placed as out patient 5/27/17                               [  ] No    HOSPITAL MEDICATIONS:  MEDICATIONS  (STANDING):  ALBUTerol/ipratropium for Nebulization 3 milliLiter(s) Nebulizer every 6 hours  aspirin  chewable 81 milliGRAM(s) Oral daily  calamine Lotion 1 Application(s) Topical daily  dextrose 5%. 1000 milliLiter(s) (50 mL/Hr) IV Continuous <Continuous>  dextrose 50% Injectable 12.5 Gram(s) IV Push once  dextrose 50% Injectable 25 Gram(s) IV Push once  heparin  Injectable 5000 Unit(s) SubCutaneous every 8 hours  insulin lispro (HumaLOG) corrective regimen sliding scale   SubCutaneous every 6 hours  insulin NPH human recombinant 13 Unit(s) SubCutaneous every 6 hours  labetalol 150 milliGRAM(s) Enteral Tube three times a day  metoclopramide 10 milliGRAM(s) Oral every 8 hours  nystatin Powder 1 Application(s) Topical two times a day  sodium chloride 3%  Inhalation 3 milliLiter(s) Inhalation four times a day    MEDICATIONS  (PRN):  acetaminophen    Suspension. 650 milliGRAM(s) Oral every 6 hours PRN Mild Pain (1 - 3)  chlorhexidine 0.12% Liquid 15 milliLiter(s) Swish and Spit every 4 hours PRN mouth hygiene  glucagon  Injectable 1 milliGRAM(s) IntraMuscular once PRN Glucose LESS THAN 70 milligrams/deciliter  hydrocortisone 1% Ointment 1 Application(s) Topical every 8 hours PRN Rash and/or Itching      LABS:                        8.3    3.9   )-----------( 394      ( 29 Mar 2018 07:32 )             26.0     03-29    137  |  94<L>  |  67<H>  ----------------------------<  185<H>  3.3<L>   |  33<H>  |  2.31<H>    Ca    10.4      29 Mar 2018 07:32  Phos  5.5     03-29  Mg     2.6     03-29              CAPILLARY BLOOD GLUCOSE    MICROBIOLOGY:     RADIOLOGY:  [ ] Reviewed and interpreted by me Patient is a 68y old  Female who presents with a chief complaint of Fever, total body weakness (02 Feb 2018 13:44)      Interval Events: No acute events overnight.     REVIEW OF SYSTEMS:  [ ] Positive  [ ] All other systems negative  [ ] Unable to assess ROS because ________    Vital Signs Last 24 Hrs  T(C): 36.4 (03-29-18 @ 05:28), Max: 37.2 (03-28-18 @ 14:00)  T(F): 97.6 (03-29-18 @ 05:28), Max: 99 (03-28-18 @ 14:00)  HR: 88 (03-29-18 @ 08:45) (80 - 107)  BP: 156/65 (03-29-18 @ 06:00) (155/75 - 181/71)  RR: 20 (03-29-18 @ 08:45) (18 - 21)  SpO2: 96% (03-29-18 @ 08:45) (96% - 100%)    PHYSICAL EXAM:  HEENT:   [x ]Tracheostomy: 6 portex uncuffed  [x ]Pupils equal  [x ]No oral lesions  [ ]Abnormal    SKIN  [ ]No Rash  [x ] Abnormal-dry irritated skin  [ ] pressure    CARDIAC  [x ]Regular  [ ]Abnormal    PULMONARY  [ x]Bilateral Clear Breath Sounds diminished to bases  [ ]Normal Excursion  [ ]Abnormal    GI  [x ]PEG      [ x] +BS		              [ x]Soft, nondistended, nontender	  [ ]Abnormal    MUSCULOSKELETAL                                   [ ]Bedbound                 [ ]Abnormal    [ ]Ambulatory/OOB to chair                           EXTREMITIES                                         [x ]Normal  [ ]Edema                           NEUROLOGIC  [ x] Normal, non focal  [ ] Focal findings:    PSYCHIATRIC  [ x]Alert and appropriate  [ ] Sedated	 [ ]Agitated    :  Wheeler: [ ] Yes, if yes: Date of Placement:                   [ x ] No    LINES: Central Lines [x ] Yes, if yes: Date of Placement: Right perma cath 3/8 by IR and [facility]- placed as out patient 5/27/17                               [  ] No    HOSPITAL MEDICATIONS:  MEDICATIONS  (STANDING):  ALBUTerol/ipratropium for Nebulization 3 milliLiter(s) Nebulizer every 6 hours  aspirin  chewable 81 milliGRAM(s) Oral daily  calamine Lotion 1 Application(s) Topical daily  dextrose 5%. 1000 milliLiter(s) (50 mL/Hr) IV Continuous <Continuous>  dextrose 50% Injectable 12.5 Gram(s) IV Push once  dextrose 50% Injectable 25 Gram(s) IV Push once  heparin  Injectable 5000 Unit(s) SubCutaneous every 8 hours  insulin lispro (HumaLOG) corrective regimen sliding scale   SubCutaneous every 6 hours  insulin NPH human recombinant 13 Unit(s) SubCutaneous every 6 hours  labetalol 150 milliGRAM(s) Enteral Tube three times a day  metoclopramide 10 milliGRAM(s) Oral every 8 hours  nystatin Powder 1 Application(s) Topical two times a day  sodium chloride 3%  Inhalation 3 milliLiter(s) Inhalation four times a day    MEDICATIONS  (PRN):  acetaminophen    Suspension. 650 milliGRAM(s) Oral every 6 hours PRN Mild Pain (1 - 3)  chlorhexidine 0.12% Liquid 15 milliLiter(s) Swish and Spit every 4 hours PRN mouth hygiene  glucagon  Injectable 1 milliGRAM(s) IntraMuscular once PRN Glucose LESS THAN 70 milligrams/deciliter  hydrocortisone 1% Ointment 1 Application(s) Topical every 8 hours PRN Rash and/or Itching      LABS:                        8.3    3.9   )-----------( 394      ( 29 Mar 2018 07:32 )             26.0     03-29    137  |  94<L>  |  67<H>  ----------------------------<  185<H>  3.3<L>   |  33<H>  |  2.31<H>    Ca    10.4      29 Mar 2018 07:32  Phos  5.5     03-29  Mg     2.6     03-29              CAPILLARY BLOOD GLUCOSE    MICROBIOLOGY:     RADIOLOGY:  [ ] Reviewed and interpreted by me

## 2018-03-29 NOTE — PROGRESS NOTE ADULT - ASSESSMENT
67F PMH HTN, DMT2 (70/30 TDD: 90 on admission), Breast Cancer (diagnosed in Feb 2017) currently on Herceptin (last dose Jan 20th), originally presented 1/30 with Fever found to have influenza subsequently went into PEA arrest x 2 with ROSC, complicated by bilateral pneumothoraces s/p 3 chest tubes and paracentesis decompression of the abdomen, then found to have PE s/p tPA 1/30, also s/p new CVA, and also new ESRD requiring almost daily HD.     First MICU admission:  Patient was admitted to MICU for further management. Patient was found to have acute renal failure and shock liver. Nephrology was consulted and patient was started on dialysis. Patient was started on Tamiflu for flu and zosyn for possible aspiration pneumonia. Patients chest tubes and peritoneal drain were removed. Patient redeveloped a pneumothorax on the L side and chest tube was reinserted. 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 Urine legionella was negative and azithromycin was discontinued. Patient continued to be anuric and continued to receive dialysis. Shock liver resolved. Patient was extubated on 2/8/17. Patient completed a 5 day course of tamiflu and 7 day course of zosyn and did not show signs of infection. Patients pneumothorax on L side resolved and chest tube was removed. Patients mental status improved and was alert and oriented x1-2 and spontaneously moves all extremities. Patient had an NG tube placed pending an official speech and swallow eval. Patient failed speech and swallow an a repeat speech and swallow was planned. Patient was then transferred to the floors for further management.     Second MICU admission on 2/21.-3/1  RRT initially called this AM for worsening tachypnea. Pt was evaluated at bedside breathing around 30s with belly breathing, hypoxic to 85% on 40% FIO2, increased to 100% with improvement in oxygen saturation to 97%. Cxray read as worsening pulm edema. ABG showed hypercapnia. Renal called for urgent HD. Also concern for aspiration PNA as increasing density of RLL.     During MICU admission patient treated with 5 days of vanc and zosyn, found to have MSSA bacteremia and MSSA in the sputum, now transitioned to cefazolin until 3/22 (post dialysis). Bacteremia has cleared since 2/22. Patient was extubated on 3/1 to nasal cannula, and was saturating well.    MICU stay #3- 3/12-hypoxic resp. failure--MICU--trach and peg 3/14-15 respectively--transfered back to [facility] 3/16 in pm  3/20: multiple episodes of vomiting reglan initiated  3/21- no further vomiting  3/22 - downsized to 6 portex uncuffed in anticipation of speaking valve today  3/23- PMV to bedside. As per  patient spend most of the time sleeping ? trial of central stimulator??  3/26- HD stable.   3/27- D/w renal will watch renal function tomorrow, if her number continue to improve likely discharge with no requirement for dialysis.   3/28- Renal fx remains stable d/c planning to rehab facility today

## 2018-03-29 NOTE — PROGRESS NOTE ADULT - ATTENDING COMMENTS
Pt seen and examined, agree with above. 67 F with HTN, DM 2, obesity, breast CA now with prolonged hospital course due to acute resp failure with hypoxia 2/2 influenza and bacterial pneumonia, PEA arrest x 2, bilateral pneumothoraces, septic shock, pre-renal ATN requiring HD, CVA, PE s/p tPA. Now s/p trach/ PEG. Stable on TC, phonating with PMV. Cont pulm toilet. Oropharyngeal dysphagia tolerating PEG feeds. Awaiting repeat swallow evalv. ATN with improving renal function, currently labs stable off HD. Will cont to monitor UO and Cr/lytes. Will require outpt nephrology FUP with serial labs upon discharge to follow renal function. Will leave permcath in place for now as her Cr is still in the mid 2 range. Can be removed as oupt once renal function improves. Ongoing hypertension, Labetalol increased to 250 mg TID. Will follow BP.  updated at bedside and his questions were answered in detail. Pt remains full code. Advanced care planning time spent: 35 mins

## 2018-03-29 NOTE — PROVIDER CONTACT NOTE (OTHER) - ACTION/TREATMENT ORDERED:
NP notified. Hold feed. Reglan to be given first. Will continue to monitor patient.
NPs notified. No further recs at this time. Will recheck electrolytes in AM. Will continue to monitor patient.
NP/MD notified. Hold feeds and Lantus. Will continue to monitor patient.
As per NP, ok to restart feeding now, can receive PO meds including aspirin. Will continue to monitor pt.
EKG and cardiac enzymes
Give Labetalol now. Monitor BP. No further change in treatment will continue to monitor.
Hold feeds until 0800. Repeat BP 1 hour. No further change in treatment will contiue to monitor.
MD made aware. Will call back with recommendations.
Provider made aware. Will come see patient.
RRT called x3 and pt intubated as per Dr. Mon request for RR 40
Tere RN from dialysis spoke with Dr. Whitfield, nephrologist, who stated to try using Cath steph. Pt may still get dialysis later on today. Per Swapna Mitchell NP hold morning dose of Coreg.
keep pt NPO until swallow eval today. continue to hold NPH insulin while pt NPO. will put order in for NC. trial pt on NC. will continue to monitor.
EKG, blood work sent, Pt. on bipap. Albumin5%/250ml. Pt. transferred to telemetry, 4monti 419.
Hold feed, will reevaluate tomorrow.
Hold feeding till 6 am recheck residual at 6am, if < 50 cc restart feeding.  Please, hold feeding 30 mins before AM care.
Will continue to monitor patient.
give Coreg as ordered  Hydralazine rescheduled as per NP  Will continue to monitor
hold 1800 NPH insulin. continue feeds. will continue to monitor
place pt on bipap, stat chx and stat nebulizer treatment.
will continue to monitor

## 2018-03-29 NOTE — PROGRESS NOTE ADULT - SUBJECTIVE AND OBJECTIVE BOX
[facility] DIVISION OF KIDNEY DISEASES AND HYPERTENSION -- FOLLOW UP NOTE  --------------------------------------------------------------------------------  Chief Complaint: RUSS     24 hour events/subjective:  stable        PAST HISTORY  --------------------------------------------------------------------------------  No significant changes to PMH, PSH, FHx, SHx, unless otherwise noted    ALLERGIES & MEDICATIONS  --------------------------------------------------------------------------------  Allergies    doxycycline (Rash)  isoniazid (Rash)  NIFEdipine (Urticaria; Hives)  vitamin E (Short breath; Urticaria; Hives)    Intolerances      Standing Inpatient Medications  ALBUTerol/ipratropium for Nebulization 3 milliLiter(s) Nebulizer every 6 hours  aspirin  chewable 81 milliGRAM(s) Oral daily  calamine Lotion 1 Application(s) Topical daily  dextrose 5%. 1000 milliLiter(s) IV Continuous <Continuous>  dextrose 50% Injectable 12.5 Gram(s) IV Push once  dextrose 50% Injectable 25 Gram(s) IV Push once  heparin  Injectable 5000 Unit(s) SubCutaneous every 8 hours  insulin lispro (HumaLOG) corrective regimen sliding scale   SubCutaneous every 6 hours  insulin NPH human recombinant 13 Unit(s) SubCutaneous every 6 hours  labetalol 250 milliGRAM(s) Enteral Tube three times a day  metoclopramide 10 milliGRAM(s) Oral every 8 hours  nystatin Powder 1 Application(s) Topical two times a day  sodium chloride 3%  Inhalation 3 milliLiter(s) Inhalation four times a day    PRN Inpatient Medications  acetaminophen    Suspension. 650 milliGRAM(s) Oral every 6 hours PRN  chlorhexidine 0.12% Liquid 15 milliLiter(s) Swish and Spit every 4 hours PRN  glucagon  Injectable 1 milliGRAM(s) IntraMuscular once PRN  hydrocortisone 1% Ointment 1 Application(s) Topical every 8 hours PRN      REVIEW OF SYSTEMS:   --------------------------------------------------------------------------------  Gen: No weight changes, fatigue, fevers/chills, weakness  Skin: No rashes  Head/Eyes/Ears/Mouth: No headache; Normal hearing; Normal vision w/o blurriness; No sinus pain/discomfort, sore throat  Respiratory: No dyspnea, cough, wheezing, hemoptysis  CV: No chest pain, PND, orthopnea  GI: No abdominal pain, diarrhea, constipation, nausea, vomiting, melena, hematochezia  : No increased frequency, dysuria, hematuria, nocturia  MSK: No joint pain/swelling; no back pain; no edema  Neuro: No dizziness/lightheadedness, weakness, seizures, numbness, tingling  Heme: No easy bruising or bleeding  Endo: No heat/cold intolerance  Psych: No significant nervousness, anxiety, stress, depression    All other systems were reviewed and are negative, except as noted.    VITALS/PHYSICAL EXAM  --------------------------------------------------------------------------------  T(C): 36.4 (03-29-18 @ 12:27), Max: 37.2 (03-28-18 @ 14:00)  HR: 85 (03-29-18 @ 12:27) (80 - 107)  BP: 148/70 (03-29-18 @ 12:27) (148/70 - 181/71)  RR: 18 (03-29-18 @ 12:27) (18 - 21)  SpO2: 98% (03-29-18 @ 12:27) (96% - 100%)  Wt(kg): --        03-28-18 @ 07:01  -  03-29-18 @ 07:00  --------------------------------------------------------  IN: 940 mL / OUT: 0 mL / NET: 940 mL      Physical Exam:  	Gen: NAD, aleart  	HEENT: PERRL, supple neck, + trach  	Pulm: CTA B/L  	CV: RRR, S1S2; no rub  	Abd: +BS, soft, nontender/nondistended, + peg  	LE: Warm, no edema  	Skin: Warm, without rashes  	Vascular access: + right chest PC    LABS/STUDIES  --------------------------------------------------------------------------------              8.3    3.9   >-----------<  394      [03-29-18 @ 07:32]              26.0     137  |  94  |  67  ----------------------------<  185      [03-29-18 @ 07:32]  3.3   |  33  |  2.31        Ca     10.4     [03-29-18 @ 07:32]      Mg     2.6     [03-29-18 @ 07:32]      Phos  5.5     [03-29-18 @ 07:32]            Creatinine Trend:  SCr 2.31 [03-29 @ 07:32]  SCr 2.59 [03-28 @ 06:27]  SCr 2.76 [03-27 @ 07:36]  SCr 3.02 [03-26 @ 06:49]  SCr 3.06 [03-25 @ 06:34]        Iron 35, TIBC 301, %sat 12      [03-29-18 @ 09:27]  Ferritin 74      [03-29-18 @ 09:27]  PTH -- (Ca 10.3)      [03-29-18 @ 09:27]   9  HbA1c 8.6      [01-31-18 @ 07:15]  Lipid: chol 183, , HDL 10,       [02-07-18 @ 13:34]    HBsAb <3.0      [02-28-18 @ 20:23]  HBsAb Nonreact      [03-23-18 @ 07:58]  HBsAg Nonreact      [03-23-18 @ 07:58]  HBcAb Nonreact      [03-23-18 @ 07:58]  HCV 0.24, Nonreact      [03-23-18 @ 07:58]

## 2018-03-29 NOTE — PROVIDER CONTACT NOTE (OTHER) - ASSESSMENT
VS stable. Mental status the same
VSS
, pt due for 20 units NPH insulin. pt feeds on hold all day. NPH not administered as per NP order while feeds on hold. pt ordered for NPH 3x per day. Feeds just restarted as per NP order
PT alert. Denies any pain. /66  T 98.6 O2sat 99%. No distress noted
PT denies pain SP02 >98%
Prior to medication administration residual volume was 40cc  VSS  No cough or SOB noted
Pt RRT yesterday, experienced hypotension, s/p Albumin x1. /72, HR 97 s/p HD @[facility].
Pt a&ox1, VS as per RRT sheet
Pt alert. vomiting. /77 Rectal temp 99.6 HR 98. Some distress noted with secretions. Pt suctioned orally and through trach. Currently sating 98% on trach collar & cough subsided.
Pt on continuous bipap, tolerating well. L nostril NGT in place, WDL.
Pt resting in bed with no complaints. No s/s of bleeding.
Pt started on Nephro at 1530, tube feed at 20.
Received pt. via bed for HD tx.  Pt. alert and reponsived. Daughter at bedside. Pt. on bipap and complained of sob. Hd tx. initiated with vss. At 1654 BP low, 94/44 HR 88, UF off. BP improved, bp 123/41.At 1700 pt. complaining of sob on bipap, O2 sat 88%, f/s 109mg/dl /45. HD tx. terminated. Dr. Darden (nephrologist) checked pt. and ordered to call RRT. RRT stabilized pt. and transferred to [id number]. /57 HR 88 T 97.5 O2 sat 100 bipap. Endorsed pt. to floor rn. at 4monti.  419.
VS stable.
no signs of aspiration noted. pt suctioned via tracheostomy and mouth. complete care given. pt kept in upright position.
pt O2 saturation >95 on continuous bipap. Rn requesting to taper O2 down.  pt still ordered for NG feeds, but pt NPO
pt R 30, labored breathing, audible crackles. O2 sat 95% of 4 L NC .

## 2018-03-29 NOTE — PROGRESS NOTE ADULT - PROBLEM SELECTOR PLAN 7
Completed 4 weeks treatment for MSSA bacteremia. Course ended on MArch 22.  - echo shows no signs of vegetation

## 2018-03-29 NOTE — PROGRESS NOTE ADULT - ATTENDING COMMENTS
68 yoF with breast cancer, HTN who p/w flu, went into respiratory arrest, then PEA arrest, s/p chest tube, TPA and intubation. Course c/b shock liver, RUSS, pneumoperitoneum, and elevated cardiac enzymes. Chest tubes and peritoneal tubes removed, and shock liver now resolved. Now with Staph aureus bacteremia. Now s/p PEG/trach.      RUSS: Likely ischemic ATN in the setting of shock/sepsis  Renal function better   Is urinating although exact volume not known  No indication for  dialysis    She can be discharged to a facility with no Dialysis  Access: PC present., will keep in place for now as her clinical status remains tenuous. Would need close follow up of blood work once discharged and if renal function is stable over the course of the next month, the PC can be removed at that time     Marginal Hypercalcemia: slightly better   likely from immobilization and also with H/O Primary Hyperparathryoidism   intact PTH suppressed  Maintain Water  via Peg ( decrease to  250 cc Q8)  Also renvela TID    Anemia:    Maintain  procrit  SQ Q WEEKLY  Iron deficient: Start  iron tabs TID    Vol/HTN: BP a bit up  Consider increasing labetaol to 200 TID

## 2018-03-29 NOTE — PROVIDER CONTACT NOTE (OTHER) - SITUATION
PT was given AM oral care as requested by family member, once complete PT aspirated clear liquid from mouth.

## 2018-03-30 ENCOUNTER — INPATIENT (INPATIENT)
Facility: HOSPITAL | Age: 68
LOS: 23 days | Discharge: NURSING, REHAB OR LONG-TERM CARE | DRG: 949 | End: 2018-04-23
Attending: PSYCHIATRY & NEUROLOGY | Admitting: PSYCHIATRY & NEUROLOGY
Payer: MEDICARE

## 2018-03-30 VITALS
OXYGEN SATURATION: 95 % | DIASTOLIC BLOOD PRESSURE: 79 MMHG | SYSTOLIC BLOOD PRESSURE: 148 MMHG | RESPIRATION RATE: 20 BRPM | TEMPERATURE: 99 F | HEART RATE: 85 BPM

## 2018-03-30 DIAGNOSIS — Z51.89 ENCOUNTER FOR OTHER SPECIFIED AFTERCARE: ICD-10-CM

## 2018-03-30 DIAGNOSIS — K31.84 GASTROPARESIS: ICD-10-CM

## 2018-03-30 DIAGNOSIS — B96.20 UNSPECIFIED ESCHERICHIA COLI [E. COLI] AS THE CAUSE OF DISEASES CLASSIFIED ELSEWHERE: ICD-10-CM

## 2018-03-30 DIAGNOSIS — N17.9 ACUTE KIDNEY FAILURE, UNSPECIFIED: ICD-10-CM

## 2018-03-30 DIAGNOSIS — R94.5 ABNORMAL RESULTS OF LIVER FUNCTION STUDIES: ICD-10-CM

## 2018-03-30 DIAGNOSIS — R41.89 OTHER SYMPTOMS AND SIGNS INVOLVING COGNITIVE FUNCTIONS AND AWARENESS: ICD-10-CM

## 2018-03-30 DIAGNOSIS — E87.1 HYPO-OSMOLALITY AND HYPONATREMIA: ICD-10-CM

## 2018-03-30 DIAGNOSIS — E11.65 TYPE 2 DIABETES MELLITUS WITH HYPERGLYCEMIA: ICD-10-CM

## 2018-03-30 DIAGNOSIS — I63.9 CEREBRAL INFARCTION, UNSPECIFIED: ICD-10-CM

## 2018-03-30 DIAGNOSIS — R21 RASH AND OTHER NONSPECIFIC SKIN ERUPTION: ICD-10-CM

## 2018-03-30 DIAGNOSIS — E11.319 TYPE 2 DIABETES MELLITUS WITH UNSPECIFIED DIABETIC RETINOPATHY WITHOUT MACULAR EDEMA: ICD-10-CM

## 2018-03-30 DIAGNOSIS — I10 ESSENTIAL (PRIMARY) HYPERTENSION: ICD-10-CM

## 2018-03-30 DIAGNOSIS — N39.0 URINARY TRACT INFECTION, SITE NOT SPECIFIED: ICD-10-CM

## 2018-03-30 DIAGNOSIS — R32 UNSPECIFIED URINARY INCONTINENCE: ICD-10-CM

## 2018-03-30 DIAGNOSIS — L89.621 PRESSURE ULCER OF LEFT HEEL, STAGE 1: ICD-10-CM

## 2018-03-30 DIAGNOSIS — R27.8 OTHER LACK OF COORDINATION: ICD-10-CM

## 2018-03-30 DIAGNOSIS — I69.321 DYSPHASIA FOLLOWING CEREBRAL INFARCTION: ICD-10-CM

## 2018-03-30 DIAGNOSIS — B96.5 PSEUDOMONAS (AERUGINOSA) (MALLEI) (PSEUDOMALLEI) AS THE CAUSE OF DISEASES CLASSIFIED ELSEWHERE: ICD-10-CM

## 2018-03-30 DIAGNOSIS — Z86.711 PERSONAL HISTORY OF PULMONARY EMBOLISM: ICD-10-CM

## 2018-03-30 DIAGNOSIS — Z98.890 OTHER SPECIFIED POSTPROCEDURAL STATES: Chronic | ICD-10-CM

## 2018-03-30 DIAGNOSIS — E87.6 HYPOKALEMIA: ICD-10-CM

## 2018-03-30 DIAGNOSIS — D64.9 ANEMIA, UNSPECIFIED: ICD-10-CM

## 2018-03-30 DIAGNOSIS — R13.10 DYSPHAGIA, UNSPECIFIED: ICD-10-CM

## 2018-03-30 DIAGNOSIS — R53.83 OTHER FATIGUE: ICD-10-CM

## 2018-03-30 DIAGNOSIS — Z79.4 LONG TERM (CURRENT) USE OF INSULIN: ICD-10-CM

## 2018-03-30 DIAGNOSIS — Z43.0 ENCOUNTER FOR ATTENTION TO TRACHEOSTOMY: ICD-10-CM

## 2018-03-30 DIAGNOSIS — G62.9 POLYNEUROPATHY, UNSPECIFIED: ICD-10-CM

## 2018-03-30 DIAGNOSIS — Z85.3 PERSONAL HISTORY OF MALIGNANT NEOPLASM OF BREAST: ICD-10-CM

## 2018-03-30 DIAGNOSIS — G47.00 INSOMNIA, UNSPECIFIED: ICD-10-CM

## 2018-03-30 DIAGNOSIS — Z43.1 ENCOUNTER FOR ATTENTION TO GASTROSTOMY: ICD-10-CM

## 2018-03-30 DIAGNOSIS — M25.512 PAIN IN LEFT SHOULDER: ICD-10-CM

## 2018-03-30 DIAGNOSIS — E11.21 TYPE 2 DIABETES MELLITUS WITH DIABETIC NEPHROPATHY: ICD-10-CM

## 2018-03-30 DIAGNOSIS — E11.43 TYPE 2 DIABETES MELLITUS WITH DIABETIC AUTONOMIC (POLY)NEUROPATHY: ICD-10-CM

## 2018-03-30 DIAGNOSIS — E44.0 MODERATE PROTEIN-CALORIE MALNUTRITION: ICD-10-CM

## 2018-03-30 DIAGNOSIS — Z90.13 ACQUIRED ABSENCE OF BILATERAL BREASTS AND NIPPLES: ICD-10-CM

## 2018-03-30 DIAGNOSIS — Z79.899 OTHER LONG TERM (CURRENT) DRUG THERAPY: ICD-10-CM

## 2018-03-30 DIAGNOSIS — D72.829 ELEVATED WHITE BLOOD CELL COUNT, UNSPECIFIED: ICD-10-CM

## 2018-03-30 LAB
ANION GAP SERPL CALC-SCNC: 9 MMOL/L — SIGNIFICANT CHANGE UP (ref 5–17)
BUN SERPL-MCNC: 66 MG/DL — HIGH (ref 7–23)
CALCIUM SERPL-MCNC: 10.3 MG/DL — SIGNIFICANT CHANGE UP (ref 8.4–10.5)
CHLORIDE SERPL-SCNC: 96 MMOL/L — SIGNIFICANT CHANGE UP (ref 96–108)
CO2 SERPL-SCNC: 32 MMOL/L — HIGH (ref 22–31)
CREAT SERPL-MCNC: 2.1 MG/DL — HIGH (ref 0.5–1.3)
GLUCOSE BLDC GLUCOMTR-MCNC: 190 MG/DL — HIGH (ref 70–99)
GLUCOSE BLDC GLUCOMTR-MCNC: 220 MG/DL — HIGH (ref 70–99)
GLUCOSE SERPL-MCNC: 225 MG/DL — HIGH (ref 70–99)
HCT VFR BLD CALC: 26.3 % — LOW (ref 34.5–45)
HGB BLD-MCNC: 8.5 G/DL — LOW (ref 11.5–15.5)
MAGNESIUM SERPL-MCNC: 2.3 MG/DL — SIGNIFICANT CHANGE UP (ref 1.6–2.6)
MCHC RBC-ENTMCNC: 29.6 PG — SIGNIFICANT CHANGE UP (ref 27–34)
MCHC RBC-ENTMCNC: 32.3 GM/DL — SIGNIFICANT CHANGE UP (ref 32–36)
MCV RBC AUTO: 91.6 FL — SIGNIFICANT CHANGE UP (ref 80–100)
PHOSPHATE SERPL-MCNC: 5 MG/DL — HIGH (ref 2.5–4.5)
PLATELET # BLD AUTO: 402 K/UL — HIGH (ref 150–400)
POTASSIUM SERPL-MCNC: 3.8 MMOL/L — SIGNIFICANT CHANGE UP (ref 3.5–5.3)
POTASSIUM SERPL-SCNC: 3.8 MMOL/L — SIGNIFICANT CHANGE UP (ref 3.5–5.3)
RBC # BLD: 2.87 M/UL — LOW (ref 3.8–5.2)
RBC # FLD: 15.4 % — HIGH (ref 10.3–14.5)
SODIUM SERPL-SCNC: 137 MMOL/L — SIGNIFICANT CHANGE UP (ref 135–145)
WBC # BLD: 4.2 K/UL — SIGNIFICANT CHANGE UP (ref 3.8–10.5)
WBC # FLD AUTO: 4.2 K/UL — SIGNIFICANT CHANGE UP (ref 3.8–10.5)

## 2018-03-30 PROCEDURE — G0515: CPT

## 2018-03-30 PROCEDURE — P9037: CPT

## 2018-03-30 PROCEDURE — 82435 ASSAY OF BLOOD CHLORIDE: CPT

## 2018-03-30 PROCEDURE — 84132 ASSAY OF SERUM POTASSIUM: CPT

## 2018-03-30 PROCEDURE — 96374 THER/PROPH/DIAG INJ IV PUSH: CPT | Mod: XU

## 2018-03-30 PROCEDURE — 86709 HEPATITIS A IGM ANTIBODY: CPT

## 2018-03-30 PROCEDURE — 85610 PROTHROMBIN TIME: CPT

## 2018-03-30 PROCEDURE — 86850 RBC ANTIBODY SCREEN: CPT

## 2018-03-30 PROCEDURE — 83735 ASSAY OF MAGNESIUM: CPT

## 2018-03-30 PROCEDURE — 97112 NEUROMUSCULAR REEDUCATION: CPT

## 2018-03-30 PROCEDURE — 87633 RESP VIRUS 12-25 TARGETS: CPT

## 2018-03-30 PROCEDURE — 31720 CLEARANCE OF AIRWAYS: CPT

## 2018-03-30 PROCEDURE — 86706 HEP B SURFACE ANTIBODY: CPT

## 2018-03-30 PROCEDURE — 82330 ASSAY OF CALCIUM: CPT

## 2018-03-30 PROCEDURE — 82803 BLOOD GASES ANY COMBINATION: CPT

## 2018-03-30 PROCEDURE — 80202 ASSAY OF VANCOMYCIN: CPT

## 2018-03-30 PROCEDURE — 87150 DNA/RNA AMPLIFIED PROBE: CPT

## 2018-03-30 PROCEDURE — 80048 BASIC METABOLIC PNL TOTAL CA: CPT

## 2018-03-30 PROCEDURE — P9017: CPT

## 2018-03-30 PROCEDURE — C1750: CPT

## 2018-03-30 PROCEDURE — C1769: CPT

## 2018-03-30 PROCEDURE — 97163 PT EVAL HIGH COMPLEX 45 MIN: CPT

## 2018-03-30 PROCEDURE — 94660 CPAP INITIATION&MGMT: CPT

## 2018-03-30 PROCEDURE — 80053 COMPREHEN METABOLIC PANEL: CPT

## 2018-03-30 PROCEDURE — 81001 URINALYSIS AUTO W/SCOPE: CPT

## 2018-03-30 PROCEDURE — 70490 CT SOFT TISSUE NECK W/O DYE: CPT

## 2018-03-30 PROCEDURE — 74018 RADEX ABDOMEN 1 VIEW: CPT

## 2018-03-30 PROCEDURE — 85027 COMPLETE CBC AUTOMATED: CPT

## 2018-03-30 PROCEDURE — 82550 ASSAY OF CK (CPK): CPT

## 2018-03-30 PROCEDURE — 94003 VENT MGMT INPAT SUBQ DAY: CPT

## 2018-03-30 PROCEDURE — 74220 X-RAY XM ESOPHAGUS 1CNTRST: CPT

## 2018-03-30 PROCEDURE — 70551 MRI BRAIN STEM W/O DYE: CPT

## 2018-03-30 PROCEDURE — 87581 M.PNEUMON DNA AMP PROBE: CPT

## 2018-03-30 PROCEDURE — 94640 AIRWAY INHALATION TREATMENT: CPT

## 2018-03-30 PROCEDURE — 82272 OCCULT BLD FECES 1-3 TESTS: CPT

## 2018-03-30 PROCEDURE — 36558 INSERT TUNNELED CV CATH: CPT

## 2018-03-30 PROCEDURE — 83036 HEMOGLOBIN GLYCOSYLATED A1C: CPT

## 2018-03-30 PROCEDURE — 80074 ACUTE HEPATITIS PANEL: CPT

## 2018-03-30 PROCEDURE — 32556 INSERT CATH PLEURA W/O IMAGE: CPT | Mod: 76

## 2018-03-30 PROCEDURE — 83615 LACTATE (LD) (LDH) ENZYME: CPT

## 2018-03-30 PROCEDURE — 92977: CPT

## 2018-03-30 PROCEDURE — 99291 CRITICAL CARE FIRST HOUR: CPT | Mod: 25

## 2018-03-30 PROCEDURE — 82565 ASSAY OF CREATININE: CPT

## 2018-03-30 PROCEDURE — 71045 X-RAY EXAM CHEST 1 VIEW: CPT

## 2018-03-30 PROCEDURE — 93970 EXTREMITY STUDY: CPT

## 2018-03-30 PROCEDURE — 97166 OT EVAL MOD COMPLEX 45 MIN: CPT

## 2018-03-30 PROCEDURE — 92597 ORAL SPEECH DEVICE EVAL: CPT

## 2018-03-30 PROCEDURE — 93308 TTE F-UP OR LMTD: CPT

## 2018-03-30 PROCEDURE — 87205 SMEAR GRAM STAIN: CPT

## 2018-03-30 PROCEDURE — 80061 LIPID PANEL: CPT

## 2018-03-30 PROCEDURE — 86900 BLOOD TYPING SEROLOGIC ABO: CPT

## 2018-03-30 PROCEDURE — 84295 ASSAY OF SERUM SODIUM: CPT

## 2018-03-30 PROCEDURE — 87798 DETECT AGENT NOS DNA AMP: CPT

## 2018-03-30 PROCEDURE — 99233 SBSQ HOSP IP/OBS HIGH 50: CPT

## 2018-03-30 PROCEDURE — 82962 GLUCOSE BLOOD TEST: CPT

## 2018-03-30 PROCEDURE — 84484 ASSAY OF TROPONIN QUANT: CPT

## 2018-03-30 PROCEDURE — 93306 TTE W/DOPPLER COMPLETE: CPT

## 2018-03-30 PROCEDURE — 95819 EEG AWAKE AND ASLEEP: CPT

## 2018-03-30 PROCEDURE — 71250 CT THORAX DX C-: CPT

## 2018-03-30 PROCEDURE — 77001 FLUOROGUIDE FOR VEIN DEVICE: CPT

## 2018-03-30 PROCEDURE — 75557 CARDIAC MRI FOR MORPH: CPT

## 2018-03-30 PROCEDURE — 93005 ELECTROCARDIOGRAM TRACING: CPT

## 2018-03-30 PROCEDURE — 95951: CPT

## 2018-03-30 PROCEDURE — 85384 FIBRINOGEN ACTIVITY: CPT

## 2018-03-30 PROCEDURE — 70450 CT HEAD/BRAIN W/O DYE: CPT

## 2018-03-30 PROCEDURE — 82728 ASSAY OF FERRITIN: CPT

## 2018-03-30 PROCEDURE — 87040 BLOOD CULTURE FOR BACTERIA: CPT

## 2018-03-30 PROCEDURE — 83550 IRON BINDING TEST: CPT

## 2018-03-30 PROCEDURE — 51701 INSERT BLADDER CATHETER: CPT | Mod: XU

## 2018-03-30 PROCEDURE — 36430 TRANSFUSION BLD/BLD COMPNT: CPT

## 2018-03-30 PROCEDURE — 86803 HEPATITIS C AB TEST: CPT

## 2018-03-30 PROCEDURE — 93321 DOPPLER ECHO F-UP/LMTD STD: CPT

## 2018-03-30 PROCEDURE — 82310 ASSAY OF CALCIUM: CPT

## 2018-03-30 PROCEDURE — 83970 ASSAY OF PARATHORMONE: CPT

## 2018-03-30 PROCEDURE — 84100 ASSAY OF PHOSPHORUS: CPT

## 2018-03-30 PROCEDURE — 85014 HEMATOCRIT: CPT

## 2018-03-30 PROCEDURE — 97530 THERAPEUTIC ACTIVITIES: CPT

## 2018-03-30 PROCEDURE — 87340 HEPATITIS B SURFACE AG IA: CPT

## 2018-03-30 PROCEDURE — P9016: CPT

## 2018-03-30 PROCEDURE — 94799 UNLISTED PULMONARY SVC/PX: CPT

## 2018-03-30 PROCEDURE — 82947 ASSAY GLUCOSE BLOOD QUANT: CPT

## 2018-03-30 PROCEDURE — 97535 SELF CARE MNGMENT TRAINING: CPT

## 2018-03-30 PROCEDURE — 97760 ORTHOTIC MGMT&TRAING 1ST ENC: CPT

## 2018-03-30 PROCEDURE — 86704 HEP B CORE ANTIBODY TOTAL: CPT

## 2018-03-30 PROCEDURE — 97110 THERAPEUTIC EXERCISES: CPT

## 2018-03-30 PROCEDURE — 83605 ASSAY OF LACTIC ACID: CPT

## 2018-03-30 PROCEDURE — 92950 HEART/LUNG RESUSCITATION CPR: CPT

## 2018-03-30 PROCEDURE — 87070 CULTURE OTHR SPECIMN AEROBIC: CPT

## 2018-03-30 PROCEDURE — 82553 CREATINE MB FRACTION: CPT

## 2018-03-30 PROCEDURE — 49082 ABD PARACENTESIS: CPT

## 2018-03-30 PROCEDURE — 85379 FIBRIN DEGRADATION QUANT: CPT

## 2018-03-30 PROCEDURE — 31500 INSERT EMERGENCY AIRWAY: CPT

## 2018-03-30 PROCEDURE — 85730 THROMBOPLASTIN TIME PARTIAL: CPT

## 2018-03-30 PROCEDURE — 99261: CPT

## 2018-03-30 PROCEDURE — 74176 CT ABD & PELVIS W/O CONTRAST: CPT

## 2018-03-30 PROCEDURE — 99239 HOSP IP/OBS DSCHRG MGMT >30: CPT

## 2018-03-30 PROCEDURE — C1894: CPT

## 2018-03-30 PROCEDURE — 94002 VENT MGMT INPAT INIT DAY: CPT

## 2018-03-30 PROCEDURE — 86901 BLOOD TYPING SEROLOGIC RH(D): CPT

## 2018-03-30 PROCEDURE — 88112 CYTOPATH CELL ENHANCE TECH: CPT

## 2018-03-30 PROCEDURE — 87086 URINE CULTURE/COLONY COUNT: CPT

## 2018-03-30 PROCEDURE — 86705 HEP B CORE ANTIBODY IGM: CPT

## 2018-03-30 PROCEDURE — 87449 NOS EACH ORGANISM AG IA: CPT

## 2018-03-30 PROCEDURE — 86923 COMPATIBILITY TEST ELECTRIC: CPT

## 2018-03-30 PROCEDURE — P9045: CPT

## 2018-03-30 PROCEDURE — 99232 SBSQ HOSP IP/OBS MODERATE 35: CPT

## 2018-03-30 PROCEDURE — 76937 US GUIDE VASCULAR ACCESS: CPT

## 2018-03-30 PROCEDURE — 71046 X-RAY EXAM CHEST 2 VIEWS: CPT

## 2018-03-30 PROCEDURE — 99152 MOD SED SAME PHYS/QHP 5/>YRS: CPT

## 2018-03-30 PROCEDURE — 87486 CHLMYD PNEUM DNA AMP PROBE: CPT

## 2018-03-30 PROCEDURE — 87186 SC STD MICRODIL/AGAR DIL: CPT

## 2018-03-30 PROCEDURE — 92610 EVALUATE SWALLOWING FUNCTION: CPT

## 2018-03-30 PROCEDURE — 36600 WITHDRAWAL OF ARTERIAL BLOOD: CPT

## 2018-03-30 PROCEDURE — 76705 ECHO EXAM OF ABDOMEN: CPT

## 2018-03-30 RX ADMIN — Medication 2: at 11:52

## 2018-03-30 RX ADMIN — Medication 81 MILLIGRAM(S): at 11:50

## 2018-03-30 RX ADMIN — Medication 0.1 MILLIGRAM(S): at 13:18

## 2018-03-30 RX ADMIN — CALAMINE 8% AND ZINC OXIDE 8% 1 APPLICATION(S): 160 LOTION TOPICAL at 11:50

## 2018-03-30 RX ADMIN — Medication 250 MILLIGRAM(S): at 06:26

## 2018-03-30 RX ADMIN — Medication 3 MILLILITER(S): at 00:23

## 2018-03-30 RX ADMIN — NYSTATIN CREAM 1 APPLICATION(S): 100000 CREAM TOPICAL at 06:26

## 2018-03-30 RX ADMIN — HUMAN INSULIN 13 UNIT(S): 100 INJECTION, SUSPENSION SUBCUTANEOUS at 06:27

## 2018-03-30 RX ADMIN — SODIUM CHLORIDE 3 MILLILITER(S): 9 INJECTION INTRAMUSCULAR; INTRAVENOUS; SUBCUTANEOUS at 00:24

## 2018-03-30 RX ADMIN — Medication 250 MILLIGRAM(S): at 13:18

## 2018-03-30 RX ADMIN — SODIUM CHLORIDE 3 MILLILITER(S): 9 INJECTION INTRAMUSCULAR; INTRAVENOUS; SUBCUTANEOUS at 11:33

## 2018-03-30 RX ADMIN — Medication 10 MILLIGRAM(S): at 13:18

## 2018-03-30 RX ADMIN — Medication 3 MILLILITER(S): at 05:44

## 2018-03-30 RX ADMIN — Medication 10 MILLIGRAM(S): at 06:26

## 2018-03-30 RX ADMIN — HEPARIN SODIUM 5000 UNIT(S): 5000 INJECTION INTRAVENOUS; SUBCUTANEOUS at 13:19

## 2018-03-30 RX ADMIN — HUMAN INSULIN 13 UNIT(S): 100 INJECTION, SUSPENSION SUBCUTANEOUS at 11:51

## 2018-03-30 RX ADMIN — HEPARIN SODIUM 5000 UNIT(S): 5000 INJECTION INTRAVENOUS; SUBCUTANEOUS at 06:27

## 2018-03-30 RX ADMIN — Medication 3 MILLILITER(S): at 11:33

## 2018-03-30 RX ADMIN — SODIUM CHLORIDE 3 MILLILITER(S): 9 INJECTION INTRAMUSCULAR; INTRAVENOUS; SUBCUTANEOUS at 05:44

## 2018-03-30 RX ADMIN — Medication 4: at 06:26

## 2018-03-30 NOTE — PROGRESS NOTE ADULT - ATTENDING COMMENTS
Pt seen and examined, agree with above. 67 F with HTN, DM 2, obesity, breast CA now with prolonged hospital course due to acute resp failure with hypoxia 2/2 influenza and bacterial pneumonia, PEA arrest x 2, bilateral pneumothoraces, septic shock, pre-renal ATN requiring HD, CVA, PE s/p tPA. Now s/p trach/ PEG. Stable on TC, phonating with PMV. Cont pulm toilet. Oropharyngeal dysphagia tolerating PEG feeds. Awaiting repeat swallow evalv. ATN with improving renal function, currently labs stable off HD. Will cont to monitor UO and Cr/lytes. Will require outpt nephrology FUP with serial labs upon discharge to follow renal function. Will leave permcath in place for now as her Cr is still in the mid 2 range. Can be removed as oupt once renal function improves. Ongoing hypertension, Labetalol increased to 250 mg TID. Will follow BP.  updated at bedside and his questions were answered in detail. Pt remains full code. Advanced care planning time spent: 35 mins Pt seen and examined, agree with above. 67 F with HTN, DM 2, obesity, breast CA now with prolonged hospital course due to acute resp failure with hypoxia 2/2 influenza and bacterial pneumonia, PEA arrest x 2, bilateral pneumothoraces, septic shock, pre-renal ATN requiring HD, CVA, PE s/p tPA. Now s/p trach/ PEG. Stable on TC, phonating with PMV. Cont pulm toilet. Oropharyngeal dysphagia tolerating PEG feeds. Awaiting repeat swallow evalv. ATN with improving renal function, currently labs stable off HD. Will cont to monitor UO and Cr/lytes. Will require outpt nephrology FUP with serial labs upon discharge to follow renal function. Will leave permcath in place for now as her Cr is still in the mid 2 range. Can be removed as oupt once renal function improves. BP improving, Cont Labetalol  250 mg TID and Clonidine 0.1 mg BID. Daughter updated at bedside and her questions were answered in detail. Plan for d-c to acute rehab today. Pt remains full code. Advanced care planning time spent: 35 mins

## 2018-03-30 NOTE — PROGRESS NOTE ADULT - SUBJECTIVE AND OBJECTIVE BOX
[facility] DIVISION OF KIDNEY DISEASES AND HYPERTENSION -- FOLLOW UP NOTE  --------------------------------------------------------------------------------  Chief Complaint: RUSS    24 hour events/subjective:  None      PAST HISTORY  --------------------------------------------------------------------------------  No significant changes to PMH, PSH, FHx, SHx, unless otherwise noted    ALLERGIES & MEDICATIONS  --------------------------------------------------------------------------------  Allergies    doxycycline (Rash)  isoniazid (Rash)  NIFEdipine (Urticaria; Hives)  vitamin E (Short breath; Urticaria; Hives)    Intolerances      Standing Inpatient Medications  ALBUTerol/ipratropium for Nebulization 3 milliLiter(s) Nebulizer every 6 hours  aspirin  chewable 81 milliGRAM(s) Oral daily  calamine Lotion 1 Application(s) Topical daily  cloNIDine 0.1 milliGRAM(s) Oral two times a day  dextrose 5%. 1000 milliLiter(s) IV Continuous <Continuous>  dextrose 50% Injectable 12.5 Gram(s) IV Push once  dextrose 50% Injectable 25 Gram(s) IV Push once  heparin  Injectable 5000 Unit(s) SubCutaneous every 8 hours  insulin lispro (HumaLOG) corrective regimen sliding scale   SubCutaneous every 6 hours  insulin NPH human recombinant 13 Unit(s) SubCutaneous every 6 hours  labetalol 250 milliGRAM(s) Enteral Tube three times a day  metoclopramide 10 milliGRAM(s) Oral every 8 hours  nystatin Powder 1 Application(s) Topical two times a day  sodium chloride 3%  Inhalation 3 milliLiter(s) Inhalation four times a day    PRN Inpatient Medications  acetaminophen    Suspension. 650 milliGRAM(s) Oral every 6 hours PRN  chlorhexidine 0.12% Liquid 15 milliLiter(s) Swish and Spit every 4 hours PRN  glucagon  Injectable 1 milliGRAM(s) IntraMuscular once PRN  hydrocortisone 1% Ointment 1 Application(s) Topical every 8 hours PRN      REVIEW OF SYSTEMS  --------------------------------------------------------------------------------  Gen: No weight changes, fatigue, fevers/chills, weakness  Skin: No rashes  Head/Eyes/Ears/Mouth: No headache; Normal hearing; Normal vision w/o blurriness; No sinus pain/discomfort, sore throat  Respiratory: No dyspnea, cough, wheezing, hemoptysis  CV: No chest pain, PND, orthopnea  GI: No abdominal pain, diarrhea, constipation, nausea, vomiting, melena, hematochezia  : No increased frequency, dysuria, hematuria, nocturia  MSK: No joint pain/swelling; no back pain; no edema  Neuro: No dizziness/lightheadedness, weakness, seizures, numbness, tingling  Heme: No easy bruising or bleeding  Endo: No heat/cold intolerance  Psych: No significant nervousness, anxiety, stress, depression    All other systems were reviewed and are negative, except as noted.    VITALS/PHYSICAL EXAM  --------------------------------------------------------------------------------  T(C): 37.1 (03-30-18 @ 16:00), Max: 37.2 (03-30-18 @ 13:45)  HR: 85 (03-30-18 @ 16:00) (74 - 94)  BP: 148/79 (03-30-18 @ 16:00) (135/70 - 171/70)  RR: 20 (03-30-18 @ 16:00) (18 - 20)  SpO2: 95% (03-30-18 @ 16:00) (95% - 100%)  Wt(kg): --        03-29-18 @ 07:01  -  03-30-18 @ 07:00  --------------------------------------------------------  IN: 1230 mL / OUT: 0 mL / NET: 1230 mL    03-30-18 @ 07:01  -  03-30-18 @ 20:20  --------------------------------------------------------  IN: 630 mL / OUT: 0 mL / NET: 630 mL      Physical Exam:  	Gen: NAD, aleart  	HEENT: PERRL, supple neck, + trach  	Pulm: CTA B/L  	CV: RRR, S1S2; no rub  	Abd: +BS, soft, nontender/nondistended, + peg  	LE: Warm, no edema  	Skin: Warm, without rashes  	Vascular access: + right chest PC      LABS/STUDIES  --------------------------------------------------------------------------------              8.5    4.2   >-----------<  402      [03-30-18 @ 06:45]              26.3     137  |  96  |  66  ----------------------------<  225      [03-30-18 @ 06:45]  3.8   |  32  |  2.10        Ca     10.3     [03-30-18 @ 06:45]      Mg     2.3     [03-30-18 @ 06:45]      Phos  5.0     [03-30-18 @ 06:45]            Creatinine Trend:  SCr 2.10 [03-30 @ 06:45]  SCr 2.31 [03-29 @ 07:32]  SCr 2.59 [03-28 @ 06:27]  SCr 2.76 [03-27 @ 07:36]  SCr 3.02 [03-26 @ 06:49]        Iron 35, TIBC 301, %sat 12      [03-29-18 @ 09:27]  Ferritin 74      [03-29-18 @ 09:27]  PTH -- (Ca 10.3)      [03-29-18 @ 09:27]   9  HbA1c 8.6      [01-31-18 @ 07:15]  Lipid: chol 183, , HDL 10,       [02-07-18 @ 13:34]    HBsAb <3.0      [02-28-18 @ 20:23]  HBsAb Nonreact      [03-23-18 @ 07:58]  HBsAg Nonreact      [03-23-18 @ 07:58]  HBcAb Nonreact      [03-23-18 @ 07:58]  HCV 0.24, Nonreact      [03-23-18 @ 07:58]

## 2018-03-30 NOTE — PROGRESS NOTE ADULT - PROBLEM SELECTOR PROBLEM 1
CVA (cerebral vascular accident)
CVA (cerebral vascular accident)
RUSS (acute kidney injury)
CVA (cerebral vascular accident)
RUSS (acute kidney injury)
Renal failure
Uncontrolled type 2 diabetes mellitus with diabetic nephropathy, with long-term current use of insulin
Acute hypoxemic respiratory failure
CVA (cerebral vascular accident)
Fever
RUSS (acute kidney injury)
Type 2 diabetes mellitus with hyperglycemia, with long-term current use of insulin
Uncontrolled type 2 diabetes mellitus with diabetic nephropathy, with long-term current use of insulin
RUSS (acute kidney injury)
Uncontrolled type 2 diabetes mellitus with diabetic nephropathy, with long-term current use of insulin
RUSS (acute kidney injury)
CVA (cerebral vascular accident)
CVA (cerebral vascular accident)
Fever

## 2018-03-30 NOTE — PROGRESS NOTE ADULT - PROBLEM SELECTOR PROBLEM 5
Essential hypertension
Hyperphosphatemia
Acute hypoxemic respiratory failure
CVA (cerebral vascular accident)
Essential hypertension

## 2018-03-30 NOTE — PROGRESS NOTE ADULT - PROBLEM SELECTOR PLAN 1
-test BG Q6h  -c/w NPH 13 units Q6h (hold if tube feeds off or BG less than 100mg/dl)  -c/w Humalog moderate correction scale Q6h  -Discharge to rehab on present insulin regimen  pager: [phone]

## 2018-03-30 NOTE — PROGRESS NOTE ADULT - SUBJECTIVE AND OBJECTIVE BOX
Patient is a 68y old  Female who presents with a chief complaint of Fever, total body weakness (02 Feb 2018 13:44)      Interval Events: none    REVIEW OF SYSTEMS:  [ ] Positive  [x ] All systems negative  [ ] Unable to assess ROS because ________    Vital Signs Last 24 Hrs  T(C): 36.8 (03-30-18 @ 04:07), Max: 36.8 (03-29-18 @ 20:47)  T(F): 98.3 (03-30-18 @ 04:07), Max: 98.3 (03-30-18 @ 04:07)  HR: 74 (03-30-18 @ 07:47) (74 - 94)  BP: 135/70 (03-30-18 @ 04:07) (135/70 - 180/85)  RR: 18 (03-30-18 @ 07:47) (18 - 20)  SpO2: 100% (03-30-18 @ 07:47) (96% - 100%)    PHYSICAL EXAM:  HEENT:   [x ]Tracheostomy:  [ ]Pupils equal  [x ]No oral lesions  [ ]Abnormal    SKIN  [x ]No Rash  [ ] Abnormal  [ ] pressure    CARDIAC  [x ]Regular  [ ]Abnormal    PULMONARY  [x ]Bilateral Clear Breath Sounds, diminished BS on L side   [ ]Normal Excursion  [ ]Abnormal    GI  [x ]PEG      [x ] +BS		              [x ]Soft, nondistended, nontender	  [ ]Abnormal    MUSCULOSKELETAL                                   [ ]Bedbound                 [ ]Abnormal    [ x]OOB to chair                           EXTREMITIES                                         [x]Normal  [ ]Edema                           NEUROLOGIC  [x ] Normal, non focal  [ ] Focal findings:    PSYCHIATRIC  [ x]Alert and appropriate  [ ] Sedated	 [ ]Agitated    :  Wheeler: [ ] YES, if yes: Date of Placement                        [x  ] NO      LINES: TLC [ ]YES, if yes: Date of Placement                    [ ] No    HOSPITAL MEDICATIONS:  MEDICATIONS  (STANDING):  ALBUTerol/ipratropium for Nebulization 3 milliLiter(s) Nebulizer every 6 hours  aspirin  chewable 81 milliGRAM(s) Oral daily  calamine Lotion 1 Application(s) Topical daily  dextrose 5%. 1000 milliLiter(s) (50 mL/Hr) IV Continuous <Continuous>  dextrose 50% Injectable 12.5 Gram(s) IV Push once  dextrose 50% Injectable 25 Gram(s) IV Push once  heparin  Injectable 5000 Unit(s) SubCutaneous every 8 hours  insulin lispro (HumaLOG) corrective regimen sliding scale   SubCutaneous every 6 hours  insulin NPH human recombinant 13 Unit(s) SubCutaneous every 6 hours  labetalol 250 milliGRAM(s) Enteral Tube three times a day  metoclopramide 10 milliGRAM(s) Oral every 8 hours  nystatin Powder 1 Application(s) Topical two times a day  sodium chloride 3%  Inhalation 3 milliLiter(s) Inhalation four times a day    MEDICATIONS  (PRN):  acetaminophen    Suspension. 650 milliGRAM(s) Oral every 6 hours PRN Mild Pain (1 - 3)  chlorhexidine 0.12% Liquid 15 milliLiter(s) Swish and Spit every 4 hours PRN mouth hygiene  glucagon  Injectable 1 milliGRAM(s) IntraMuscular once PRN Glucose LESS THAN 70 milligrams/deciliter  hydrocortisone 1% Ointment 1 Application(s) Topical every 8 hours PRN Rash and/or Itching      LABS:                        8.5    4.2   )-----------( 402      ( 30 Mar 2018 06:45 )             26.3     03-30    137  |  96  |  66<H>  ----------------------------<  225<H>  3.8   |  32<H>  |  2.10<H>    Ca    10.3      30 Mar 2018 06:45  Phos  5.0     03-30  Mg     2.3     03-30              CAPILLARY BLOOD GLUCOSE    MICROBIOLOGY:     RADIOLOGY:  [ ] Reviewed and interpreted by me Patient is a 68y old  Female who presents with a chief complaint of Fever, total body weakness (02 Feb 2018 13:44)      Interval Events: none    REVIEW OF SYSTEMS:  [ ] Positive  [x ] All systems negative  [ ] Unable to assess ROS because ________    Vital Signs Last 24 Hrs  T(C): 36.8 (03-30-18 @ 04:07), Max: 36.8 (03-29-18 @ 20:47)  T(F): 98.3 (03-30-18 @ 04:07), Max: 98.3 (03-30-18 @ 04:07)  HR: 74 (03-30-18 @ 07:47) (74 - 94)  BP: 135/70 (03-30-18 @ 04:07) (135/70 - 180/85)  RR: 18 (03-30-18 @ 07:47) (18 - 20)  SpO2: 100% (03-30-18 @ 07:47) (96% - 100%)    PHYSICAL EXAM:  HEENT:   [x ]Tracheostomy:  [x ]Pupils equal  [x ]No oral lesions  [ ]Abnormal    SKIN  [x ]No Rash  [ ] Abnormal  [ ] pressure    CARDIAC  [x ]Regular  [ ]Abnormal    PULMONARY  [x ]Bilateral Clear Breath Sounds, diminished BS on L side   [ ]Normal Excursion  [ ]Abnormal    GI  [x ]PEG      [x ] +BS		              [x ]Soft, nondistended, nontender	  [ ]Abnormal    MUSCULOSKELETAL                                   [ ]Bedbound                 [ ]Abnormal    [ x]OOB to chair                           EXTREMITIES                                         [x]Normal  [ ]Edema                           NEUROLOGIC  [x ] Normal, non focal  [ ] Focal findings:    PSYCHIATRIC  [ x]Alert and appropriate  [ ] Sedated	 [ ]Agitated    :  Wheeler: [ ] YES, if yes: Date of Placement                        [x  ] NO      LINES: TLC [ ]YES, if yes: Date of Placement                    [ ] No    HOSPITAL MEDICATIONS:  MEDICATIONS  (STANDING):  ALBUTerol/ipratropium for Nebulization 3 milliLiter(s) Nebulizer every 6 hours  aspirin  chewable 81 milliGRAM(s) Oral daily  calamine Lotion 1 Application(s) Topical daily  dextrose 5%. 1000 milliLiter(s) (50 mL/Hr) IV Continuous <Continuous>  dextrose 50% Injectable 12.5 Gram(s) IV Push once  dextrose 50% Injectable 25 Gram(s) IV Push once  heparin  Injectable 5000 Unit(s) SubCutaneous every 8 hours  insulin lispro (HumaLOG) corrective regimen sliding scale   SubCutaneous every 6 hours  insulin NPH human recombinant 13 Unit(s) SubCutaneous every 6 hours  labetalol 250 milliGRAM(s) Enteral Tube three times a day  metoclopramide 10 milliGRAM(s) Oral every 8 hours  nystatin Powder 1 Application(s) Topical two times a day  sodium chloride 3%  Inhalation 3 milliLiter(s) Inhalation four times a day    MEDICATIONS  (PRN):  acetaminophen    Suspension. 650 milliGRAM(s) Oral every 6 hours PRN Mild Pain (1 - 3)  chlorhexidine 0.12% Liquid 15 milliLiter(s) Swish and Spit every 4 hours PRN mouth hygiene  glucagon  Injectable 1 milliGRAM(s) IntraMuscular once PRN Glucose LESS THAN 70 milligrams/deciliter  hydrocortisone 1% Ointment 1 Application(s) Topical every 8 hours PRN Rash and/or Itching      LABS:                        8.5    4.2   )-----------( 402      ( 30 Mar 2018 06:45 )             26.3     03-30    137  |  96  |  66<H>  ----------------------------<  225<H>  3.8   |  32<H>  |  2.10<H>    Ca    10.3      30 Mar 2018 06:45  Phos  5.0     03-30  Mg     2.3     03-30              CAPILLARY BLOOD GLUCOSE    MICROBIOLOGY:     RADIOLOGY:  [ ] Reviewed and interpreted by me

## 2018-03-30 NOTE — PROGRESS NOTE ADULT - PROBLEM/PLAN-5
DISPLAY PLAN FREE TEXT
No

## 2018-03-30 NOTE — PROGRESS NOTE ADULT - PROBLEM SELECTOR PROBLEM 8
RUSS (acute kidney injury)
Dysphagia
Breast CA
Dysphagia
RUSS (acute kidney injury)

## 2018-03-30 NOTE — PROGRESS NOTE ADULT - PROBLEM SELECTOR PROBLEM 6
CVA (cerebral vascular accident)
Need for prophylactic measure
CVA (cerebral vascular accident)
Need for prophylactic measure
Uncontrolled type 2 diabetes mellitus with diabetic nephropathy, with long-term current use of insulin

## 2018-03-30 NOTE — PROGRESS NOTE ADULT - PROBLEM/PLAN-1
DISPLAY PLAN FREE TEXT

## 2018-03-30 NOTE — PROGRESS NOTE ADULT - PROBLEM SELECTOR PROBLEM 2
Anemia
Anemia
Cardiac arrest
Cardiac arrest
Electrolyte and fluid disorder
Anemia
Cardiac arrest
Electrolyte and fluid disorder
Acute hypoxemic respiratory failure
Anemia
Cardiac arrest
Hypercalcemia due to hyperthyroidism
Hypercalcemia due to hyperthyroidism
Anemia
Hypokalemia
Anemia
Cardiac arrest
Acute hypoxemic respiratory failure
Cardiac arrest

## 2018-03-30 NOTE — PROGRESS NOTE ADULT - SUBJECTIVE AND OBJECTIVE BOX
Diabetes Follow up note:  Interval Hx:  67 y/o F with T2DM uncontrolled with neuropathy and ESRD now on HD on insulin, osteoporosis, HTN, here with acute respiratory failure 2/2 influenza s/p CVA and PEA arrest x 2, and MSSA bacteremia on month long tx for aspiration PNA. Tolerating TF today after episode of vomiting yesterday. BG values mostly at goal but elevated this AM. Awaiting discharge to rehab.  Pt resting when seen at bedside.     Review of Systems:  General: No complaints.   GI: Tolerating TFs without any N/V/D/ABD PAIN.  CV: No CP/SOB  ENDO: No S&Sx of hypoglycemia  MEDS:    insulin lispro (HumaLOG) corrective regimen sliding scale   SubCutaneous every 6 hours  insulin NPH human recombinant 13 Unit(s) SubCutaneous every 6 hours      Allergies    doxycycline (Rash)  isoniazid (Rash)  NIFEdipine (Urticaria; Hives)  vitamin E (Short breath; Urticaria; Hives)      PE:  General: Female lying in bed. NAD.   Vital Signs Last 24 Hrs  T(C): 37.2 (30 Mar 2018 13:45), Max: 37.2 (30 Mar 2018 13:45)  T(F): 99 (30 Mar 2018 13:45), Max: 99 (30 Mar 2018 13:45)  HR: 88 (30 Mar 2018 13:45) (74 - 94)  BP: 156/80 (30 Mar 2018 13:45) (135/70 - 171/70)  BP(mean): --  RR: 19 (30 Mar 2018 13:45) (18 - 20)  SpO2: 95% (30 Mar 2018 13:45) (95% - 100%)  HEENT: Trach in place.   Abd: Soft, NT,ND, PEG in place.   Extremities: Warm. no edema noted.   Neuro: A&O X3    LABS:    POCT Blood Glucose.: 190 mg/dL (03-30-18 @ 11:46)  POCT Blood Glucose.: 220 mg/dL (03-30-18 @ 05:54)  POCT Blood Glucose.: 150 mg/dL (03-29-18 @ 23:23)  POCT Blood Glucose.: 124 mg/dL (03-29-18 @ 17:51)  POCT Blood Glucose.: 149 mg/dL (03-29-18 @ 12:05)  POCT Blood Glucose.: 170 mg/dL (03-29-18 @ 06:38)  POCT Blood Glucose.: 134 mg/dL (03-29-18 @ 00:01)  POCT Blood Glucose.: 166 mg/dL (03-28-18 @ 17:45)  POCT Blood Glucose.: 171 mg/dL (03-28-18 @ 12:42)  POCT Blood Glucose.: 176 mg/dL (03-28-18 @ 05:56)  POCT Blood Glucose.: 192 mg/dL (03-27-18 @ 23:35)  POCT Blood Glucose.: 190 mg/dL (03-27-18 @ 17:45)                            8.5    4.2   )-----------( 402      ( 30 Mar 2018 06:45 )             26.3       03-30    137  |  96  |  66<H>  ----------------------------<  225<H>  3.8   |  32<H>  |  2.10<H>    Ca    10.3      30 Mar 2018 06:45  Phos  5.0     03-30  Mg     2.3     03-30          Hemoglobin A1C, Whole Blood: 8.6 % <H> [4.0 - 5.6] (01-31-18 @ 07:15)  Hemoglobin A1C, Whole Blood: 8.7 % <H> [4.0 - 5.6] (01-31-18 @ 05:51)            Contact number: marcelino [phone] or [phone]

## 2018-03-30 NOTE — PROGRESS NOTE ADULT - PROBLEM SELECTOR PROBLEM 7
Uncontrolled type 2 diabetes mellitus with diabetic nephropathy, with long-term current use of insulin
Bacteremia
RUSS (acute kidney injury)
Uncontrolled type 2 diabetes mellitus with diabetic nephropathy, with long-term current use of insulin

## 2018-03-30 NOTE — PROGRESS NOTE ADULT - PROBLEM SELECTOR PROBLEM 3
Hyperphosphatemia
Hyperphosphatemia
Diabetes
Diabetes
Hyperphosphatemia
Diabetes
Diabetes
Hyperphosphatemia
Oliguria
Diabetes
Hyperphosphatemia
Cardiac arrest
Diabetes
Hyperphosphatemia
Pneumothorax
Hyperphosphatemia
Hypercalcemia
Cardiac arrest

## 2018-03-30 NOTE — PROGRESS NOTE ADULT - PROBLEM SELECTOR PROBLEM 4
RUSS (acute kidney injury)
Hypermagnesemia
RUSS (acute kidney injury)
Essential hypertension
Hypermagnesemia
Pneumothorax
RUSS (acute kidney injury)
Alkalosis
Pneumothorax

## 2018-03-30 NOTE — PROGRESS NOTE ADULT - ASSESSMENT
67 y/o F with T2DM uncontrolled with neuropathy and ESRD now on HD on insulin, osteoporosis, HTN, here with acute respiratory failure 2/2 influenza s/p CVA and PEA arrest x 2, and MSSA bacteremia on month long tx for aspiration pna, s/p PEG and trach placement. BG values mostly at goal but slightly elevated today. BG goal (100-180mg/dl). Pt readying for discharge to rehab so wouldn't make further changes to insulin regimen at this time.

## 2018-03-30 NOTE — PROGRESS NOTE ADULT - ATTENDING COMMENTS
68 yoF with breast cancer, HTN who p/w flu, went into respiratory arrest, then PEA arrest, s/p chest tube, TPA and intubation. Course c/b shock liver, RUSS, pneumoperitoneum, and elevated cardiac enzymes. Chest tubes and peritoneal tubes removed, and shock liver now resolved. Now with Staph aureus bacteremia. Now s/p PEG/trach.      RUSS: Likely ischemic ATN in the setting of shock/sepsis  Renal function continues to improve  Is urinating although exact volume not known  No indication for  dialysis    She can be discharged to a facility with no Dialysis  Access: PC present., will keep in place for now as her clinical status remains tenuous. Would need close follow up of blood work once discharged and if renal function is stable over the course of the next 2-3 weeks, the PC can be removed at that time . Will need PC dressing change Q Weekly. Explained to the patients family to make sure the site of PC is not wet    Marginal Hypercalcemia: slightly better   likely from immobilization and also with H/O Primary Hyperparathryoidism   intact PTH suppressed  Maintain Water  via Peg ( decrease to  250 cc Q8)  Also renvela TID    Anemia:    Maintain  procrit  SQ Q WEEKLY   Iron deficient: Start  iron tabs TID    Vol/HTN: BP a bit up  Consider increasing labetaol

## 2018-03-30 NOTE — PROGRESS NOTE ADULT - I WAS PHYSICALLY PRESENT FOR THE KEY PORTIONS OF THE EVALUATION AND MANAGEMENT (E/M) SERVICE PROVIDED.  I AGREE WITH THE ABOVE HISTORY, PHYSICAL, AND PLAN WHICH I HAVE REVIEWED AND EDITED WHERE APPROPRIATE

## 2018-03-30 NOTE — PROGRESS NOTE ADULT - PROVIDER SPECIALTY LIST ADULT
Anesthesia
Critical Care
Endocrinology
Gastroenterology
Infectious Disease
Internal Medicine
Intervent Radiology
MICU
Nephrology
Neurology
Neurology
Pulmonology
Rehab Medicine
Surgery
Infectious Disease
MICU
MICU
Nephrology
Pulmonology
MICU
Nephrology
Endocrinology
Endocrinology
Nephrology
Endocrinology
Endocrinology
Nephrology
Internal Medicine
Pulmonology

## 2018-03-31 PROCEDURE — 99221 1ST HOSP IP/OBS SF/LOW 40: CPT

## 2018-03-31 PROCEDURE — 99223 1ST HOSP IP/OBS HIGH 75: CPT

## 2018-03-31 PROCEDURE — 93010 ELECTROCARDIOGRAM REPORT: CPT

## 2018-04-01 PROCEDURE — 99232 SBSQ HOSP IP/OBS MODERATE 35: CPT

## 2018-04-01 PROCEDURE — 99233 SBSQ HOSP IP/OBS HIGH 50: CPT

## 2018-04-02 PROCEDURE — 99233 SBSQ HOSP IP/OBS HIGH 50: CPT

## 2018-04-02 PROCEDURE — 93010 ELECTROCARDIOGRAM REPORT: CPT

## 2018-04-02 PROCEDURE — 99232 SBSQ HOSP IP/OBS MODERATE 35: CPT

## 2018-04-03 PROCEDURE — 99233 SBSQ HOSP IP/OBS HIGH 50: CPT

## 2018-04-03 PROCEDURE — 71045 X-RAY EXAM CHEST 1 VIEW: CPT | Mod: 26

## 2018-04-03 PROCEDURE — 99232 SBSQ HOSP IP/OBS MODERATE 35: CPT

## 2018-04-04 DIAGNOSIS — G93.1 ANOXIC BRAIN DAMAGE, NOT ELSEWHERE CLASSIFIED: ICD-10-CM

## 2018-04-04 PROCEDURE — 99233 SBSQ HOSP IP/OBS HIGH 50: CPT

## 2018-04-04 PROCEDURE — 99232 SBSQ HOSP IP/OBS MODERATE 35: CPT

## 2018-04-05 PROCEDURE — 99232 SBSQ HOSP IP/OBS MODERATE 35: CPT

## 2018-04-05 PROCEDURE — 93971 EXTREMITY STUDY: CPT | Mod: 26,RT

## 2018-04-06 PROCEDURE — 99232 SBSQ HOSP IP/OBS MODERATE 35: CPT

## 2018-04-07 PROCEDURE — 99233 SBSQ HOSP IP/OBS HIGH 50: CPT

## 2018-04-07 PROCEDURE — 99222 1ST HOSP IP/OBS MODERATE 55: CPT | Mod: 25

## 2018-04-07 PROCEDURE — 31615 TRCHEOBRNCHSC EST TRACHS INC: CPT

## 2018-04-08 PROCEDURE — 99232 SBSQ HOSP IP/OBS MODERATE 35: CPT

## 2018-04-09 ENCOUNTER — TRANSCRIPTION ENCOUNTER (OUTPATIENT)
Age: 68
End: 2018-04-09

## 2018-04-09 PROCEDURE — 99233 SBSQ HOSP IP/OBS HIGH 50: CPT

## 2018-04-09 PROCEDURE — 76705 ECHO EXAM OF ABDOMEN: CPT | Mod: 26,RT

## 2018-04-09 PROCEDURE — 99232 SBSQ HOSP IP/OBS MODERATE 35: CPT

## 2018-04-10 PROCEDURE — 99233 SBSQ HOSP IP/OBS HIGH 50: CPT

## 2018-04-11 PROCEDURE — 90792 PSYCH DIAG EVAL W/MED SRVCS: CPT

## 2018-04-11 PROCEDURE — 99232 SBSQ HOSP IP/OBS MODERATE 35: CPT

## 2018-04-11 PROCEDURE — 99233 SBSQ HOSP IP/OBS HIGH 50: CPT

## 2018-04-11 PROCEDURE — 71045 X-RAY EXAM CHEST 1 VIEW: CPT | Mod: 26

## 2018-04-12 PROCEDURE — 99232 SBSQ HOSP IP/OBS MODERATE 35: CPT

## 2018-04-12 PROCEDURE — 99233 SBSQ HOSP IP/OBS HIGH 50: CPT

## 2018-04-13 PROCEDURE — 99233 SBSQ HOSP IP/OBS HIGH 50: CPT

## 2018-04-14 PROCEDURE — 99232 SBSQ HOSP IP/OBS MODERATE 35: CPT

## 2018-04-15 PROCEDURE — 99233 SBSQ HOSP IP/OBS HIGH 50: CPT

## 2018-04-15 PROCEDURE — 99232 SBSQ HOSP IP/OBS MODERATE 35: CPT

## 2018-04-15 PROCEDURE — 99231 SBSQ HOSP IP/OBS SF/LOW 25: CPT

## 2018-04-15 PROCEDURE — 71045 X-RAY EXAM CHEST 1 VIEW: CPT | Mod: 26

## 2018-04-16 PROCEDURE — 99232 SBSQ HOSP IP/OBS MODERATE 35: CPT

## 2018-04-16 PROCEDURE — 74230 X-RAY XM SWLNG FUNCJ C+: CPT | Mod: 26

## 2018-04-16 PROCEDURE — 99233 SBSQ HOSP IP/OBS HIGH 50: CPT

## 2018-04-17 PROCEDURE — 99233 SBSQ HOSP IP/OBS HIGH 50: CPT

## 2018-04-18 PROCEDURE — 99233 SBSQ HOSP IP/OBS HIGH 50: CPT

## 2018-04-18 PROCEDURE — 99232 SBSQ HOSP IP/OBS MODERATE 35: CPT

## 2018-04-19 ENCOUNTER — APPOINTMENT (OUTPATIENT)
Dept: ENDOCRINOLOGY | Facility: CLINIC | Age: 68
End: 2018-04-19

## 2018-04-19 PROCEDURE — 99233 SBSQ HOSP IP/OBS HIGH 50: CPT

## 2018-04-20 PROCEDURE — 99232 SBSQ HOSP IP/OBS MODERATE 35: CPT

## 2018-04-21 PROCEDURE — 99232 SBSQ HOSP IP/OBS MODERATE 35: CPT | Mod: GC

## 2018-04-22 PROCEDURE — 99232 SBSQ HOSP IP/OBS MODERATE 35: CPT | Mod: GC

## 2018-04-23 PROCEDURE — 99233 SBSQ HOSP IP/OBS HIGH 50: CPT

## 2018-04-23 PROCEDURE — 99231 SBSQ HOSP IP/OBS SF/LOW 25: CPT

## 2018-04-23 PROCEDURE — 99239 HOSP IP/OBS DSCHRG MGMT >30: CPT

## 2018-04-24 PROCEDURE — 83735 ASSAY OF MAGNESIUM: CPT

## 2018-04-24 PROCEDURE — 90732 PPSV23 VACC 2 YRS+ SUBQ/IM: CPT

## 2018-04-24 PROCEDURE — 92610 EVALUATE SWALLOWING FUNCTION: CPT

## 2018-04-24 PROCEDURE — 92526 ORAL FUNCTION THERAPY: CPT

## 2018-04-24 PROCEDURE — 87086 URINE CULTURE/COLONY COUNT: CPT

## 2018-04-24 PROCEDURE — 85025 COMPLETE CBC W/AUTO DIFF WBC: CPT

## 2018-04-24 PROCEDURE — 84100 ASSAY OF PHOSPHORUS: CPT

## 2018-04-24 PROCEDURE — 97530 THERAPEUTIC ACTIVITIES: CPT

## 2018-04-24 PROCEDURE — 97116 GAIT TRAINING THERAPY: CPT

## 2018-04-24 PROCEDURE — 94799 UNLISTED PULMONARY SVC/PX: CPT

## 2018-04-24 PROCEDURE — 80076 HEPATIC FUNCTION PANEL: CPT

## 2018-04-24 PROCEDURE — 80053 COMPREHEN METABOLIC PANEL: CPT

## 2018-04-24 PROCEDURE — 80069 RENAL FUNCTION PANEL: CPT

## 2018-04-24 PROCEDURE — 82728 ASSAY OF FERRITIN: CPT

## 2018-04-24 PROCEDURE — 74230 X-RAY XM SWLNG FUNCJ C+: CPT

## 2018-04-24 PROCEDURE — 80074 ACUTE HEPATITIS PANEL: CPT

## 2018-04-24 PROCEDURE — 82607 VITAMIN B-12: CPT

## 2018-04-24 PROCEDURE — 92507 TX SP LANG VOICE COMM INDIV: CPT

## 2018-04-24 PROCEDURE — 83550 IRON BINDING TEST: CPT

## 2018-04-24 PROCEDURE — 94640 AIRWAY INHALATION TREATMENT: CPT

## 2018-04-24 PROCEDURE — 84550 ASSAY OF BLOOD/URIC ACID: CPT

## 2018-04-24 PROCEDURE — 93971 EXTREMITY STUDY: CPT

## 2018-04-24 PROCEDURE — 93005 ELECTROCARDIOGRAM TRACING: CPT

## 2018-04-24 PROCEDURE — 85027 COMPLETE CBC AUTOMATED: CPT

## 2018-04-24 PROCEDURE — 82746 ASSAY OF FOLIC ACID SERUM: CPT

## 2018-04-24 PROCEDURE — 92523 SPEECH SOUND LANG COMPREHEN: CPT

## 2018-04-24 PROCEDURE — 82248 BILIRUBIN DIRECT: CPT

## 2018-04-24 PROCEDURE — 71045 X-RAY EXAM CHEST 1 VIEW: CPT

## 2018-04-24 PROCEDURE — 82977 ASSAY OF GGT: CPT

## 2018-04-24 PROCEDURE — 86381 MITOCHONDRIAL ANTIBODY EACH: CPT

## 2018-04-24 PROCEDURE — 83615 LACTATE (LD) (LDH) ENZYME: CPT

## 2018-04-24 PROCEDURE — 80048 BASIC METABOLIC PNL TOTAL CA: CPT

## 2018-04-24 PROCEDURE — 85610 PROTHROMBIN TIME: CPT

## 2018-04-24 PROCEDURE — 97110 THERAPEUTIC EXERCISES: CPT

## 2018-04-24 PROCEDURE — L8501: CPT

## 2018-04-24 PROCEDURE — 97533 SENSORY INTEGRATION: CPT

## 2018-04-24 PROCEDURE — 76705 ECHO EXAM OF ABDOMEN: CPT

## 2018-04-24 PROCEDURE — 97535 SELF CARE MNGMENT TRAINING: CPT

## 2018-04-24 PROCEDURE — G0515: CPT

## 2018-04-24 PROCEDURE — 83036 HEMOGLOBIN GLYCOSYLATED A1C: CPT

## 2018-04-24 PROCEDURE — 81003 URINALYSIS AUTO W/O SCOPE: CPT

## 2018-04-24 PROCEDURE — 84165 PROTEIN E-PHORESIS SERUM: CPT

## 2018-04-24 PROCEDURE — 92611 MOTION FLUOROSCOPY/SWALLOW: CPT

## 2018-05-11 ENCOUNTER — INPATIENT (INPATIENT)
Facility: HOSPITAL | Age: 68
LOS: 10 days | Discharge: NURSING, REHAB OR LONG-TERM CARE | DRG: 641 | End: 2018-05-22
Attending: INTERNAL MEDICINE | Admitting: GENERAL ACUTE CARE HOSPITAL
Payer: MEDICARE

## 2018-05-11 VITALS
OXYGEN SATURATION: 98 % | HEART RATE: 34 BPM | DIASTOLIC BLOOD PRESSURE: 112 MMHG | SYSTOLIC BLOOD PRESSURE: 125 MMHG | RESPIRATION RATE: 21 BRPM

## 2018-05-11 DIAGNOSIS — Z98.890 OTHER SPECIFIED POSTPROCEDURAL STATES: Chronic | ICD-10-CM

## 2018-05-11 LAB
ALBUMIN SERPL ELPH-MCNC: 0.8 G/DL — LOW (ref 3.3–5)
ALBUMIN SERPL ELPH-MCNC: 3.2 G/DL — LOW (ref 3.3–5)
ALBUMIN SERPL ELPH-MCNC: 3.2 G/DL — LOW (ref 3.3–5)
ALP SERPL-CCNC: 184 U/L — HIGH (ref 40–120)
ALP SERPL-CCNC: 192 U/L — HIGH (ref 40–120)
ALP SERPL-CCNC: 48 U/L — SIGNIFICANT CHANGE UP (ref 40–120)
ALT FLD-CCNC: 22 U/L — SIGNIFICANT CHANGE UP (ref 10–45)
ALT FLD-CCNC: 30 U/L — SIGNIFICANT CHANGE UP (ref 10–45)
ALT FLD-CCNC: 6 U/L — LOW (ref 10–45)
ANION GAP SERPL CALC-SCNC: 14 MMOL/L — SIGNIFICANT CHANGE UP (ref 5–17)
ANION GAP SERPL CALC-SCNC: 8 MMOL/L — SIGNIFICANT CHANGE UP (ref 5–17)
ANION GAP SERPL CALC-SCNC: 9 MMOL/L — SIGNIFICANT CHANGE UP (ref 5–17)
AST SERPL-CCNC: 15 U/L — SIGNIFICANT CHANGE UP (ref 10–40)
AST SERPL-CCNC: 69 U/L — HIGH (ref 10–40)
AST SERPL-CCNC: <5 U/L — LOW (ref 10–40)
BASE EXCESS BLDV CALC-SCNC: -7.3 MMOL/L — LOW (ref -2–2)
BASOPHILS # BLD AUTO: 0 K/UL — SIGNIFICANT CHANGE UP (ref 0–0.2)
BASOPHILS # BLD AUTO: 0 K/UL — SIGNIFICANT CHANGE UP (ref 0–0.2)
BASOPHILS NFR BLD AUTO: 0 % — SIGNIFICANT CHANGE UP (ref 0–2)
BASOPHILS NFR BLD AUTO: 0.2 % — SIGNIFICANT CHANGE UP (ref 0–2)
BILIRUB SERPL-MCNC: 0.1 MG/DL — LOW (ref 0.2–1.2)
BILIRUB SERPL-MCNC: 0.2 MG/DL — SIGNIFICANT CHANGE UP (ref 0.2–1.2)
BILIRUB SERPL-MCNC: 0.3 MG/DL — SIGNIFICANT CHANGE UP (ref 0.2–1.2)
BUN SERPL-MCNC: 14 MG/DL — SIGNIFICANT CHANGE UP (ref 7–23)
BUN SERPL-MCNC: 30 MG/DL — HIGH (ref 7–23)
BUN SERPL-MCNC: 32 MG/DL — HIGH (ref 7–23)
CA-I SERPL-SCNC: 1.32 MMOL/L — HIGH (ref 1.12–1.3)
CALCIUM SERPL-MCNC: 14.5 MG/DL — CRITICAL HIGH (ref 8.4–10.5)
CALCIUM SERPL-MCNC: 8.8 MG/DL — SIGNIFICANT CHANGE UP (ref 8.4–10.5)
CALCIUM SERPL-MCNC: 9.3 MG/DL — SIGNIFICANT CHANGE UP (ref 8.4–10.5)
CHLORIDE BLDV-SCNC: 103 MMOL/L — SIGNIFICANT CHANGE UP (ref 96–108)
CHLORIDE SERPL-SCNC: 108 MMOL/L — SIGNIFICANT CHANGE UP (ref 96–108)
CHLORIDE SERPL-SCNC: 93 MMOL/L — LOW (ref 96–108)
CHLORIDE SERPL-SCNC: 98 MMOL/L — SIGNIFICANT CHANGE UP (ref 96–108)
CK MB CFR SERPL CALC: 1 NG/ML — SIGNIFICANT CHANGE UP (ref 0–3.8)
CK SERPL-CCNC: 77 U/L — SIGNIFICANT CHANGE UP (ref 25–170)
CO2 BLDV-SCNC: 21 MMOL/L — LOW (ref 22–30)
CO2 SERPL-SCNC: 12 MMOL/L — LOW (ref 22–31)
CO2 SERPL-SCNC: 19 MMOL/L — LOW (ref 22–31)
CO2 SERPL-SCNC: 8 MMOL/L — CRITICAL LOW (ref 22–31)
CREAT SERPL-MCNC: 0.61 MG/DL — SIGNIFICANT CHANGE UP (ref 0.5–1.3)
CREAT SERPL-MCNC: 1.43 MG/DL — HIGH (ref 0.5–1.3)
CREAT SERPL-MCNC: 1.57 MG/DL — HIGH (ref 0.5–1.3)
EOSINOPHIL # BLD AUTO: 0 K/UL — SIGNIFICANT CHANGE UP (ref 0–0.5)
EOSINOPHIL # BLD AUTO: 0.1 K/UL — SIGNIFICANT CHANGE UP (ref 0–0.5)
EOSINOPHIL NFR BLD AUTO: 0.7 % — SIGNIFICANT CHANGE UP (ref 0–6)
EOSINOPHIL NFR BLD AUTO: 0.8 % — SIGNIFICANT CHANGE UP (ref 0–6)
GAS PNL BLDV: 123 MMOL/L — LOW (ref 136–145)
GAS PNL BLDV: SIGNIFICANT CHANGE UP
GLUCOSE BLDV-MCNC: 341 MG/DL — HIGH (ref 70–99)
GLUCOSE SERPL-MCNC: 362 MG/DL — HIGH (ref 70–99)
GLUCOSE SERPL-MCNC: 418 MG/DL — HIGH (ref 70–99)
GLUCOSE SERPL-MCNC: 658 MG/DL — CRITICAL HIGH (ref 70–99)
HCO3 BLDV-SCNC: 20 MMOL/L — LOW (ref 21–29)
HCT VFR BLD CALC: 14.6 % — CRITICAL LOW (ref 34.5–45)
HCT VFR BLD CALC: 35.6 % — SIGNIFICANT CHANGE UP (ref 34.5–45)
HCT VFR BLDA CALC: 35 % — LOW (ref 39–50)
HGB BLD CALC-MCNC: 11.4 G/DL — LOW (ref 11.5–15.5)
HGB BLD-MCNC: 11.4 G/DL — LOW (ref 11.5–15.5)
HGB BLD-MCNC: 4.2 G/DL — CRITICAL LOW (ref 11.5–15.5)
LACTATE BLDV-MCNC: 1.3 MMOL/L — SIGNIFICANT CHANGE UP (ref 0.7–2)
LYMPHOCYTES # BLD AUTO: 0.4 K/UL — LOW (ref 1–3.3)
LYMPHOCYTES # BLD AUTO: 1.2 K/UL — SIGNIFICANT CHANGE UP (ref 1–3.3)
LYMPHOCYTES # BLD AUTO: 12.3 % — LOW (ref 13–44)
LYMPHOCYTES # BLD AUTO: 12.7 % — LOW (ref 13–44)
MAGNESIUM SERPL-MCNC: 1.8 MG/DL — SIGNIFICANT CHANGE UP (ref 1.6–2.6)
MCHC RBC-ENTMCNC: 28 PG — SIGNIFICANT CHANGE UP (ref 27–34)
MCHC RBC-ENTMCNC: 28.6 GM/DL — LOW (ref 32–36)
MCHC RBC-ENTMCNC: 29.4 PG — SIGNIFICANT CHANGE UP (ref 27–34)
MCHC RBC-ENTMCNC: 32 GM/DL — SIGNIFICANT CHANGE UP (ref 32–36)
MCV RBC AUTO: 103 FL — HIGH (ref 80–100)
MCV RBC AUTO: 87.5 FL — SIGNIFICANT CHANGE UP (ref 80–100)
MONOCYTES # BLD AUTO: 0.3 K/UL — SIGNIFICANT CHANGE UP (ref 0–0.9)
MONOCYTES # BLD AUTO: 1 K/UL — HIGH (ref 0–0.9)
MONOCYTES NFR BLD AUTO: 10.5 % — SIGNIFICANT CHANGE UP (ref 2–14)
MONOCYTES NFR BLD AUTO: 10.6 % — SIGNIFICANT CHANGE UP (ref 2–14)
NEUTROPHILS # BLD AUTO: 2.2 K/UL — SIGNIFICANT CHANGE UP (ref 1.8–7.4)
NEUTROPHILS # BLD AUTO: 7.2 K/UL — SIGNIFICANT CHANGE UP (ref 1.8–7.4)
NEUTROPHILS NFR BLD AUTO: 76 % — SIGNIFICANT CHANGE UP (ref 43–77)
NEUTROPHILS NFR BLD AUTO: 76.1 % — SIGNIFICANT CHANGE UP (ref 43–77)
NT-PROBNP SERPL-SCNC: 670 PG/ML — HIGH (ref 0–300)
PCO2 BLDV: 47 MMHG — SIGNIFICANT CHANGE UP (ref 35–50)
PH BLDV: 7.24 — LOW (ref 7.35–7.45)
PHOSPHATE SERPL-MCNC: 4.7 MG/DL — HIGH (ref 2.5–4.5)
PLATELET # BLD AUTO: 248 K/UL — SIGNIFICANT CHANGE UP (ref 150–400)
PLATELET # BLD AUTO: 85 K/UL — LOW (ref 150–400)
PO2 BLDV: 35 MMHG — SIGNIFICANT CHANGE UP (ref 25–45)
POTASSIUM BLDV-SCNC: 6.6 MMOL/L — CRITICAL HIGH (ref 3.5–5)
POTASSIUM SERPL-MCNC: 2.9 MMOL/L — CRITICAL LOW (ref 3.5–5.3)
POTASSIUM SERPL-MCNC: 6.9 MMOL/L — CRITICAL HIGH (ref 3.5–5.3)
POTASSIUM SERPL-MCNC: >9 MMOL/L — CRITICAL HIGH (ref 3.5–5.3)
POTASSIUM SERPL-SCNC: 2.9 MMOL/L — CRITICAL LOW (ref 3.5–5.3)
POTASSIUM SERPL-SCNC: 6.9 MMOL/L — CRITICAL HIGH (ref 3.5–5.3)
POTASSIUM SERPL-SCNC: >9 MMOL/L — CRITICAL HIGH (ref 3.5–5.3)
PROT SERPL-MCNC: 1.8 G/DL — LOW (ref 6–8.3)
PROT SERPL-MCNC: 6.2 G/DL — SIGNIFICANT CHANGE UP (ref 6–8.3)
PROT SERPL-MCNC: 7.1 G/DL — SIGNIFICANT CHANGE UP (ref 6–8.3)
RBC # BLD: 1.42 M/UL — LOW (ref 3.8–5.2)
RBC # BLD: 4.07 M/UL — SIGNIFICANT CHANGE UP (ref 3.8–5.2)
RBC # FLD: 14.7 % — HIGH (ref 10.3–14.5)
RBC # FLD: 14.7 % — HIGH (ref 10.3–14.5)
SAO2 % BLDV: 51 % — LOW (ref 67–88)
SODIUM SERPL-SCNC: 119 MMOL/L — CRITICAL LOW (ref 135–145)
SODIUM SERPL-SCNC: 124 MMOL/L — LOW (ref 135–145)
SODIUM SERPL-SCNC: 126 MMOL/L — LOW (ref 135–145)
TROPONIN T SERPL-MCNC: 0.02 NG/ML — SIGNIFICANT CHANGE UP (ref 0–0.06)
TSH SERPL-MCNC: 1.11 UIU/ML — SIGNIFICANT CHANGE UP (ref 0.27–4.2)
WBC # BLD: 2.9 K/UL — LOW (ref 3.8–10.5)
WBC # BLD: 9.4 K/UL — SIGNIFICANT CHANGE UP (ref 3.8–10.5)
WBC # FLD AUTO: 2.9 K/UL — LOW (ref 3.8–10.5)
WBC # FLD AUTO: 9.4 K/UL — SIGNIFICANT CHANGE UP (ref 3.8–10.5)

## 2018-05-11 PROCEDURE — 71045 X-RAY EXAM CHEST 1 VIEW: CPT | Mod: 26

## 2018-05-11 PROCEDURE — 36556 INSERT NON-TUNNEL CV CATH: CPT

## 2018-05-11 PROCEDURE — 99291 CRITICAL CARE FIRST HOUR: CPT | Mod: 25,GC

## 2018-05-11 PROCEDURE — 93010 ELECTROCARDIOGRAM REPORT: CPT | Mod: 76,59

## 2018-05-11 RX ORDER — POTASSIUM CHLORIDE 20 MEQ
10 PACKET (EA) ORAL
Qty: 0 | Refills: 0 | Status: DISCONTINUED | OUTPATIENT
Start: 2018-05-11 | End: 2018-05-11

## 2018-05-11 RX ORDER — ATROPINE SULFATE 0.1 MG/ML
0.5 SYRINGE (ML) INJECTION ONCE
Qty: 0 | Refills: 0 | Status: DISCONTINUED | OUTPATIENT
Start: 2018-05-11 | End: 2018-05-11

## 2018-05-11 RX ORDER — FUROSEMIDE 40 MG
40 TABLET ORAL ONCE
Qty: 0 | Refills: 0 | Status: COMPLETED | OUTPATIENT
Start: 2018-05-11 | End: 2018-05-11

## 2018-05-11 RX ORDER — ATROPINE SULFATE 0.1 MG/ML
0.5 SYRINGE (ML) INJECTION ONCE
Qty: 0 | Refills: 0 | Status: COMPLETED | OUTPATIENT
Start: 2018-05-11 | End: 2018-05-11

## 2018-05-11 RX ORDER — CALCIUM GLUCONATE 100 MG/ML
1 VIAL (ML) INTRAVENOUS ONCE
Qty: 0 | Refills: 0 | Status: COMPLETED | OUTPATIENT
Start: 2018-05-11 | End: 2018-05-11

## 2018-05-11 RX ORDER — POTASSIUM CHLORIDE 20 MEQ
40 PACKET (EA) ORAL ONCE
Qty: 0 | Refills: 0 | Status: DISCONTINUED | OUTPATIENT
Start: 2018-05-11 | End: 2018-05-11

## 2018-05-11 RX ORDER — SODIUM CHLORIDE 9 MG/ML
1000 INJECTION INTRAMUSCULAR; INTRAVENOUS; SUBCUTANEOUS ONCE
Qty: 0 | Refills: 0 | Status: COMPLETED | OUTPATIENT
Start: 2018-05-11 | End: 2018-05-11

## 2018-05-11 RX ORDER — DEXTROSE 50 % IN WATER 50 %
50 SYRINGE (ML) INTRAVENOUS ONCE
Qty: 0 | Refills: 0 | Status: COMPLETED | OUTPATIENT
Start: 2018-05-11 | End: 2018-05-11

## 2018-05-11 RX ORDER — CALCIUM GLUCONATE 100 MG/ML
2 VIAL (ML) INTRAVENOUS ONCE
Qty: 0 | Refills: 0 | Status: DISCONTINUED | OUTPATIENT
Start: 2018-05-11 | End: 2018-05-11

## 2018-05-11 RX ORDER — ASPIRIN/CALCIUM CARB/MAGNESIUM 324 MG
81 TABLET ORAL ONCE
Qty: 0 | Refills: 0 | Status: COMPLETED | OUTPATIENT
Start: 2018-05-11 | End: 2018-05-11

## 2018-05-11 RX ORDER — SODIUM BICARBONATE 1 MEQ/ML
50 SYRINGE (ML) INTRAVENOUS ONCE
Qty: 0 | Refills: 0 | Status: COMPLETED | OUTPATIENT
Start: 2018-05-11 | End: 2018-05-11

## 2018-05-11 RX ORDER — INSULIN HUMAN 100 [IU]/ML
10 INJECTION, SOLUTION SUBCUTANEOUS ONCE
Qty: 0 | Refills: 0 | Status: COMPLETED | OUTPATIENT
Start: 2018-05-11 | End: 2018-05-11

## 2018-05-11 RX ORDER — ALBUTEROL 90 UG/1
2.5 AEROSOL, METERED ORAL ONCE
Qty: 0 | Refills: 0 | Status: COMPLETED | OUTPATIENT
Start: 2018-05-11 | End: 2018-05-11

## 2018-05-11 RX ADMIN — INSULIN HUMAN 10 UNIT(S): 100 INJECTION, SOLUTION SUBCUTANEOUS at 22:05

## 2018-05-11 RX ADMIN — SODIUM CHLORIDE 1000 MILLILITER(S): 9 INJECTION INTRAMUSCULAR; INTRAVENOUS; SUBCUTANEOUS at 19:16

## 2018-05-11 RX ADMIN — Medication 50 MILLIEQUIVALENT(S): at 22:06

## 2018-05-11 RX ADMIN — Medication 200 GRAM(S): at 23:35

## 2018-05-11 RX ADMIN — Medication 200 GRAM(S): at 22:08

## 2018-05-11 RX ADMIN — Medication 50 MILLILITER(S): at 22:04

## 2018-05-11 RX ADMIN — Medication 81 MILLIGRAM(S): at 21:37

## 2018-05-11 RX ADMIN — Medication 200 GRAM(S): at 20:09

## 2018-05-11 RX ADMIN — ALBUTEROL 2.5 MILLIGRAM(S): 90 AEROSOL, METERED ORAL at 22:01

## 2018-05-11 RX ADMIN — Medication 0.5 MILLIGRAM(S): at 19:16

## 2018-05-11 RX ADMIN — Medication 40 MILLIGRAM(S): at 22:10

## 2018-05-11 NOTE — ED PROVIDER NOTE - OBJECTIVE STATEMENT
69 y/o F w/ hx of DM-2, HTN, recent hospitalization w/ respiratory arrest, complicated by aspiration pna, s/p trach and PEG tube, brought in from rehab after found to be bradycardic w/ HR in 30s, pt reports CP and dyspnea today. Pt is on both beta blocker and CCB and dosages were recently increased.

## 2018-05-11 NOTE — H&P ADULT - NSHPPHYSICALEXAM_GEN_ALL_CORE
Gen: NAD  Eyes:  sclerae white, no icterus  ENT: Dry mucous membranes. No exudates  Neck: Trach in place, no JVD  CV: Bradycardic no R/M/G  Pulm: Clear to auscultation bilaterally. No wheezes, rales, or rhonchi  Abd: BS+, nondistended, No tenderness to palpation  Musculoskeletal:  No edema  Psych: mood good, affect full range and congruent with mood.  Neurologic: AAOx3

## 2018-05-11 NOTE — H&P ADULT - NSHPREVIEWOFSYSTEMS_GEN_ALL_CORE
REVIEW OF SYSTEMS:    CONSTITUTIONAL: No weakness, fevers or chills  EYES/ENT: No visual changes;  No vertigo or throat pain   NECK: No pain or stiffness  RESPIRATORY: +SOB; No cough, wheezing, hemoptysis  CARDIOVASCULAR: +chest pain, No palpitations  GASTROINTESTINAL: No abdominal or epigastric pain. No nausea, vomiting, or hematemesis; No diarrhea or constipation. No melena or hematochezia.  GENITOURINARY: No dysuria, frequency or hematuria  NEUROLOGICAL: No numbness or weakness  SKIN: No itching, burning, rashes, or lesions   All other review of systems is negative unless indicated above.

## 2018-05-11 NOTE — ED ADULT NURSE REASSESSMENT NOTE - NS ED NURSE REASSESS COMMENT FT1
Mediport confirmed via x-ray. Mediport access attempted by TAMRA Martinez, and TAMRA Harris. Mediport access flushed without resistance, however no blood return. Family requested third attempt to access mediport, without blood return. Risks and benefits of central line explained to patient and family by MD García and MD Jane. Right internal jugular access successfully obtained by MD García, supervised by MD Jane. Right IJ flushes without resistance and has positive blood return. Right IJ central line dressed by MD García and secured. Covered with green curos cap.

## 2018-05-11 NOTE — ED PROVIDER NOTE - MEDICAL DECISION MAKING DETAILS
Hx DM-2, recent resp arrest, CVA, s/p PEG and tach, p/w bradycardia reporting CP and dyspnea. Unclear etiology of bradycardia, diff incl. tox (BB and CCB), ischemic cardiac event. Placed on pacer pads, will order labs, give atropine, hydration, calcium gluconate, admit

## 2018-05-11 NOTE — H&P ADULT - HISTORY OF PRESENT ILLNESS
67 y/o F w/ hx of DM-2, HTN, recent hospitalization w/ respiratory arrest, complicated by aspiration pna, s/p trach and PEG tube, brought in from rehab after found to be bradycardic w/ HR in 30s, pt reports CP and dyspnea today. Pt is on both beta blocker and CCB and dosages were recently increased. 67 y/o F w/ hx of DM-2, HTN, breast CA (dx in 2/2017), w recent hospitalization for respiratory arrest 2/2 influenza, complicated by aspiration pna, PEA arrest x2 with ROS, PE s/p tPA, ARF requiring HD, s/p trach and PEG tube presents with bradycardia and shortness of breath. Pt was brought in from rehab after found to be bradycardic w/ HR in 30s. Hx obtain via pt and family at bedside. Pt c/o worsening shortness of breath and chest pain today. She is on both beta blocker and long acting CCB and dosages were recently increased.     In ED, pts vs were T100.8 /51 RR16 SaO2 97%. Labs were significant for hyperkalemia to 6.9 and blood glucose of 362. EKG showed bradycardia and junctional rhythm. 69 y/o F w/ hx of DM-2, HTN, breast CA (dx in 2/2017), w recent hospitalization for respiratory arrest 2/2 influenza, complicated by aspiration pna, PEA arrest x2 with ROS, PE s/p tPA, ARF requiring HD, s/p trach and PEG tube presents with bradycardia and shortness of breath. Pt was brought in from rehab after found to be bradycardic w/ HR in 30s. Hx obtain via pt and family at bedside. Pt c/o worsening shortness of breath and chest pain today. She has been taking carvedilol, but extended release diltiazem was recently added to her medication regimen.     In ED, pts vs were T100.8 /51 RR16 SaO2 97%. Labs were significant for hyperkalemia to 6.9 and blood glucose of 362. EKG showed bradycardia and junctional rhythm and peaked T waves. She was given atropine 0.5mg x2, 10U insulin +D50, calcium gluconate x4, NaHCO3 x1, and lasix 40mg IV. Her HR improved after temporizing measures and she was admitted to the MICU for bradycardia in the setting of hyperkalemia. 69 y/o F w/ hx of DM2, HTN, breast CA (dx in 2/2017), w recent hospitalization for respiratory arrest 2/2 influenza, complicated by aspiration pna, PEA arrest x2 with ROS, PE s/p tPA, ARF requiring HD, s/p trach and PEG tube presents with bradycardia and shortness of breath. Pt was brought in from rehab after found to be bradycardic w/ HR in 30s. Hx obtain via pt and family at bedside. Pt c/o worsening shortness of breath and chest pain today. She has been taking carvedilol, but extended release diltiazem was recently added to her medication regimen after the daughter asked for pt to be taken off clonidine for hypertension.     In ED, pts vs were T100.8 /51 RR16 SaO2 97%. Labs were significant for hyperkalemia to 6.9 and blood glucose of 362. EKG showed bradycardia and junctional rhythm and peaked T waves. She was given atropine 0.5mg x2, 10U insulin +D50, calcium gluconate x4, NaHCO3 x1, and lasix 40mg IV. Her HR improved after temporizing measures and she was admitted to the MICU for bradycardia in the setting of hyperkalemia.

## 2018-05-11 NOTE — ED PROVIDER NOTE - ATTENDING CONTRIBUTION TO CARE
attending Buck: 68yF h/o DMII, HTN, recent hospitalization w/ respiratory arrest c/b aspiration pna, s/p trach and PEG tube, presents from rehab after being found to be bradycardic to 30s with assocuated chest pain and dyspnea today. Pt on beta- and calcium channel-blockers which doses were recently increased. Pt reportedly hypotensive to 80s systolic when EMS arrived, received 1L IVF prior to arrival to ED. On arrival, pt chronically-ill appearing, tracheostomy in place, bradycardic to 30s, BPs 100s-120s systolic. EKG shows junctional rhythm with ?hyperacute/peaked T waves. Bradycardia possible 2/2 hyperkalemia, toxicologic cause (Beta Blocker and CCB), ischemic event. Will place on pacer pads, trial atropine, IVF, tele monitoring, labs, empirically treat for hyperkalemia with calcium given peaked T waves on ekg, cxr and admit.

## 2018-05-11 NOTE — ED PROVIDER NOTE - PROGRESS NOTE DETAILS
Cordis catheter placed for venous access and resuscitation Pt given hyperkalemia coctail. MICU consulted, will see pt. attending Buck: after hyperkalemia cocktail, pt now with HR in 70s, now in NSR. Awaiting ICU evaluation.

## 2018-05-11 NOTE — H&P ADULT - ASSESSMENT
69 y/o F w/ hx of DM-2, HTN, breast CA (dx in 2/2017), w recent hospitalization for respiratory arrest 2/2 influenza, complicated by aspiration pna, PEA arrest x2 with ROS, PE s/p tPA, ARF requiring HD, s/p trach and PEG tube presents with bradycardia and EKG concerning for arrythmia 2/2 67 y/o F w/ hx of DM2, HTN, breast CA (dx in 2/2017), w recent hospitalization for respiratory arrest 2/2 influenza, complicated by aspiration pna, PEA arrest x2 with ROS, PE s/p tPA, ARF requiring HD, s/p trach and PEG tube presents with bradycardia and EKG concerning for arrythmia 2/2 medication side effect vs hyperkalemia.     #Neuro  - pt is at baseline mental status  - no active issues    #CV  - bradycardic to 30s in ED with EGK concerning for junctional rhythm and peaked T waves. Improved after temporizing measures.  - will give kayexalate    #Pulm  - s/p tracheostomy    #Endo  - VBG on admission showed pH of 7.24, PCO2 of 47, indicating metabolic acidosis. Concern for mild DKA given elevated blood glucose of 362  - will c/w home NPH 13U Q6H and place on ISS, with fingerstick BG checks TID before meals and at bedtime  - check serum betahydroxy butyrate and acetone    #Renal  - pt has hx of ESRD requiring HD during previous admission, now off HD. Serum Cr 1.57  - renally dose all meds, avoid nephrotoxins    #FEN/GI  - has PEG, will c/w tube feeds  -     #ID  -WBC wnl and afebrile on presentation  -will cont to monitor off abx for now    #PPX  - heparin subQ  - 67 y/o F w/ hx of DM2, HTN, breast CA (dx in 2/2017), w recent hospitalization for respiratory arrest 2/2 influenza, complicated by aspiration pna, PEA arrest x2 with ROS, PE s/p tPA, ARF requiring HD, s/p trach and PEG tube presents with bradycardia and EKG concerning for arrythmia 2/2 medication side effect vs hyperkalemia.     #Neuro  - pt is at baseline mental status  - no active issues    #CV  - bradycardic to 30s in ED with EGK concerning for junctional rhythm and peaked T waves. Improved after temporizing measures. Concern for juanita agent toxicity (pt on both beta blockers and CCBs) in setting of CKD and hyperkalemia, elevated blood glucose  - continuous telemetry  - atropine at bedside PRN, external pacing pads on pt  - trend Kika  - BMP Q4H  - hydralazine 10mg IV Q6H PRN for SBP > 160    #Pulm  - s/p tracheostomy for chronic hypoxemic respiratory failure  - will check ABG, and continued with trach collar  - duonebs Q6H    #Endo  - VBG on admission showed pH of 7.24, PCO2 of 47, indicating metabolic acidosis. Concern for mild DKA given elevated blood glucose of 362, now 313.  - will place on insulin gtt, fingersticks Q1H  - check serum betahydroxy butyrate and acetone    #Renal  - pt has hx of ESRD requiring HD during previous admission, now off HD but w CKD. Serum Cr 1.57  - hyperkalemic to 6.9 in ED, will repeat BMP now and Q4H. Monitor K while on insulin gtt, kayexalate if not improving  - Strict I&Os, cw lasix PRN  - renally dose all meds, avoid nephrotoxins  - will give NS @ 75 for 3 hours     #FEN/GI  - NPO while on insulin gtt  - has PEG, will c/w tube feeds    #ID  -WBC wnl and afebrile on presentation  - blood and urine cx sent, f/u results  -will cont to monitor off abx for now    #PPX  - heparin subQ

## 2018-05-11 NOTE — H&P ADULT - NSHPATTENDINGPLANDISCUSS_GEN_ALL_CORE
Case discussed extensively with family ( Case discussed extensively with family ( daughter ,  at bedside), staff, team and specialist on board.

## 2018-05-11 NOTE — ED ADULT NURSE NOTE - OBJECTIVE STATEMENT
68 year old female awake and alert and unable to assess mental status because patient was not answering questions, but speech was coherent.  Patient is from Nursing Home and was found to be bradycardic in the 30s and SPB in the 90s.  Patient given 1L of IVF prior to arrival.  Patient c/o chest pain today and denies; shortness of breath, N/V.  Patient has recent h/o stroke and Cardiac Arrest.  Patient has trach in place and patent with trach collar at 40% and IV in left hand from nursing home.  Patient has a PEG, but family states patient is being fed by mouth now.  Patient able to move all 4 extremities.  Patient has blanchable redness on the mid back with a raised area present.  Patient has hyperpigmentation on the sacrum.  Patient turned, cleaned and repositioned.  Safety ensured and #22 established in left thumb.  Family at bedside.

## 2018-05-11 NOTE — ED PROVIDER NOTE - PHYSICAL EXAMINATION
Gen: NAD  Eyes:  sclerae white, no icterus  ENT: Dry mucous membranes. No exudates  Neck: Tach in place  CV: Barry. Audible S1 and S2. No murmurs, rubs, gallops, S3, nor S4  Pulm: Clear to auscultation bilaterally. No wheezes, rales, or rhonchi  Abd: BS+, nondistended, No tenderness to palpation  Musculoskeletal:  No edema  Psych: mood good, affect full range and congruent with mood.  Neurologic: AAOx3

## 2018-05-11 NOTE — H&P ADULT - ATTENDING COMMENTS
This is a 67 y/o F w/ hx as detailed prior including   DM-2, HTN, breast CA (dx in 2/2017), w recent hospitalization for respiratory arrest 2/2 influenza, complicated by aspiration pna, PEA arrest x2 with ROS, PE s/p tPA, ARF requiring HD, s/p trach and PEG tube presents with bradycardia and shortness of breath. Pt was brought in from rehab after found to be bradycardic w/ HR in 30s. Hx obtain via pt and family at bedside. Pt c/o worsening shortness of breath and chest pain today. She has been taking carvedilol, but extended release diltiazem was recently added to her medication regimen. Patient now upgraded to the MICU with-  1. Chronic hypoxemic respiratory failure - continue TC, Duonebs, aggressive pulmonary toilet aspiration precautions, wean respiratory support as tolerated.   2. Bradycardia - This is a 67 y/o F w/ hx as detailed prior including   DM-2, HTN, breast CA (dx in 2/2017), w recent hospitalization for respiratory arrest 2/2 influenza, complicated by aspiration pna, PEA arrest x2 with ROS, PE s/p tPA, ARF requiring HD, s/p trach and PEG tube presents with bradycardia and shortness of breath. Pt was brought in from rehab after found to be bradycardic w/ HR in 30s. Hx obtain via pt and family at bedside. Pt c/o worsening shortness of breath and chest pain today. She has been taking carvedilol, but extended release diltiazem was recently added to her medication regimen. Patient now upgraded to the MICU with-  1. Chronic hypoxemic respiratory failure - continue TC, Duonebs, aggressive pulmonary toilet aspiration precautions, wean respiratory support as tolerated.   2. Bradycardia - with initial junctional escape rhythm- multifactorial- patient stated on secondary juanita agent  diltiazem (CCB toxicity)  also coupled with CKD and now hyperkalemia in the setting of  un controlled  glucose.  Continue tele monitoring , atropine at bedside, supportive care with IV calcium and optimizing potassium and glucose load, will start insulin gtt, external pacing pads , may need vasopressor as well as inotropic augmentation , +/- ventricular pacing if aforementioned measures are ineffective. CE  3. CKD 4 now with metabolic derangements including - hypo-osmolar hyponatremia, hyperkalemia This is a 67 y/o F w/ hx as detailed prior including   DM-2, HTN, breast CA (dx in 2/2017), w recent hospitalization for respiratory arrest 2/2 influenza, complicated by aspiration pna, PEA arrest x2 with ROS, ARF requiring HD, s/p trach and PEG tube presents with bradycardia and shortness of breath. Pt was brought in from rehab after found to be bradycardic w/ HR in 30s. Hx obtain via pt and family at bedside. Pt c/o worsening shortness of breath and chest pain today. She has been taking carvedilol, but extended release diltiazem was recently added to her medication regimen. Patient now upgraded to the MICU with-  1. Chronic hypoxemic respiratory failure - continue TC, Duonebs, aggressive pulmonary toilet aspiration precautions, wean respiratory support as tolerated.   2. Bradycardia - with initial junctional escape rhythm- multifactorial- patient stated on secondary juanita agent  diltiazem (CCB toxicity)  also coupled with CKD and now hyperkalemia in the setting of  un controlled  glucose.  Continue tele monitoring , atropine at bedside, supportive care with IV calcium and optimizing potassium and glucose load, will start insulin gtt, external pacing pads , may need vasopressor as well as inotropic augmentation , +/- ventricular pacing if aforementioned measures are ineffective. CE  3. CKD 4 now with metabolic derangements including - hypo-osmolar hyponatremia, hyperkalemia- EKG with IV ca2 as well as translocating and evacuation therapy as needed.   4. Oropharyngeal dysphagia now s/p PEG but with improved  bulbar function now on dysphagia diet as per daughter,  5. uncontrolled hypertension- optimize BP regimen with non-juanita agents   6. uncontrolled diabetes- significant now on insulin gtt, check hbA1c, f/u with dietary AND endocrine.  Care and treatment as detailed prior unless otherwise stated. Case discussed extensively with family ( daughter ,  at bedside), staff, team and specialist on board. Prognosis guarded.

## 2018-05-11 NOTE — ED PROVIDER NOTE - CRITICAL CARE PROVIDED
interpretation of diagnostic studies/additional history taking/consultation with other physicians/documentation/consult w/ pt's family directly relating to pts condition/direct patient care (not related to procedure)

## 2018-05-11 NOTE — H&P ADULT - NSHPLABSRESULTS_GEN_ALL_CORE
11.4   9.4   )-----------( 248      ( 11 May 2018 21:07 )             35.6       05-11    130<L>  |  96  |  29<H>  ----------------------------<  344<H>  5.9<H>   |  22  |  1.50<H>    Ca    10.3      11 May 2018 23:37  Phos  4.7     05-11  Mg     1.8     05-11    TPro  6.2  /  Alb  3.2<L>  /  TBili  0.2  /  DBili  x   /  AST  15  /  ALT  22  /  AlkPhos  184<H>  05-11                  PT/INR - ( 11 May 2018 21:56 )   PT: 11.1 sec;   INR: 1.03 ratio         PTT - ( 11 May 2018 21:56 )  PTT:23.9 sec    Lactate Trend      CARDIAC MARKERS ( 11 May 2018 18:27 )  x     / 0.02 ng/mL / 77 U/L / x     / 1.0 ng/mL        CAPILLARY BLOOD GLUCOSE      POCT Blood Glucose.: 321 mg/dL (11 May 2018 21:53)

## 2018-05-12 DIAGNOSIS — E87.5 HYPERKALEMIA: ICD-10-CM

## 2018-05-12 LAB
ACETONE SERPL-MCNC: NEGATIVE — SIGNIFICANT CHANGE UP
ANION GAP SERPL CALC-SCNC: 11 MMOL/L — SIGNIFICANT CHANGE UP (ref 5–17)
ANION GAP SERPL CALC-SCNC: 12 MMOL/L — SIGNIFICANT CHANGE UP (ref 5–17)
ANION GAP SERPL CALC-SCNC: 12 MMOL/L — SIGNIFICANT CHANGE UP (ref 5–17)
ANION GAP SERPL CALC-SCNC: 14 MMOL/L — SIGNIFICANT CHANGE UP (ref 5–17)
ANION GAP SERPL CALC-SCNC: 14 MMOL/L — SIGNIFICANT CHANGE UP (ref 5–17)
APPEARANCE UR: CLEAR — SIGNIFICANT CHANGE UP
B-OH-BUTYR SERPL-SCNC: 0.1 MMOL/L — SIGNIFICANT CHANGE UP
BASE EXCESS BLDV CALC-SCNC: -2.7 MMOL/L — LOW (ref -2–2)
BASE EXCESS BLDV CALC-SCNC: -2.8 MMOL/L — LOW (ref -2–2)
BILIRUB UR-MCNC: NEGATIVE — SIGNIFICANT CHANGE UP
BUN SERPL-MCNC: 22 MG/DL — SIGNIFICANT CHANGE UP (ref 7–23)
BUN SERPL-MCNC: 25 MG/DL — HIGH (ref 7–23)
BUN SERPL-MCNC: 26 MG/DL — HIGH (ref 7–23)
BUN SERPL-MCNC: 28 MG/DL — HIGH (ref 7–23)
BUN SERPL-MCNC: 29 MG/DL — HIGH (ref 7–23)
CA-I SERPL-SCNC: 1.27 MMOL/L — SIGNIFICANT CHANGE UP (ref 1.12–1.3)
CA-I SERPL-SCNC: 1.32 MMOL/L — HIGH (ref 1.12–1.3)
CALCIUM SERPL-MCNC: 10.1 MG/DL — SIGNIFICANT CHANGE UP (ref 8.4–10.5)
CALCIUM SERPL-MCNC: 10.3 MG/DL — SIGNIFICANT CHANGE UP (ref 8.4–10.5)
CALCIUM SERPL-MCNC: 10.7 MG/DL — HIGH (ref 8.4–10.5)
CALCIUM SERPL-MCNC: 9.5 MG/DL — SIGNIFICANT CHANGE UP (ref 8.4–10.5)
CALCIUM SERPL-MCNC: 9.7 MG/DL — SIGNIFICANT CHANGE UP (ref 8.4–10.5)
CHLORIDE BLDV-SCNC: 101 MMOL/L — SIGNIFICANT CHANGE UP (ref 96–108)
CHLORIDE BLDV-SCNC: 102 MMOL/L — SIGNIFICANT CHANGE UP (ref 96–108)
CHLORIDE SERPL-SCNC: 100 MMOL/L — SIGNIFICANT CHANGE UP (ref 96–108)
CHLORIDE SERPL-SCNC: 96 MMOL/L — SIGNIFICANT CHANGE UP (ref 96–108)
CO2 BLDV-SCNC: 24 MMOL/L — SIGNIFICANT CHANGE UP (ref 22–30)
CO2 BLDV-SCNC: 25 MMOL/L — SIGNIFICANT CHANGE UP (ref 22–30)
CO2 SERPL-SCNC: 20 MMOL/L — LOW (ref 22–31)
CO2 SERPL-SCNC: 20 MMOL/L — LOW (ref 22–31)
CO2 SERPL-SCNC: 21 MMOL/L — LOW (ref 22–31)
CO2 SERPL-SCNC: 22 MMOL/L — SIGNIFICANT CHANGE UP (ref 22–31)
CO2 SERPL-SCNC: 23 MMOL/L — SIGNIFICANT CHANGE UP (ref 22–31)
COLOR SPEC: COLORLESS — SIGNIFICANT CHANGE UP
CREAT SERPL-MCNC: 1.06 MG/DL — SIGNIFICANT CHANGE UP (ref 0.5–1.3)
CREAT SERPL-MCNC: 1.14 MG/DL — SIGNIFICANT CHANGE UP (ref 0.5–1.3)
CREAT SERPL-MCNC: 1.26 MG/DL — SIGNIFICANT CHANGE UP (ref 0.5–1.3)
CREAT SERPL-MCNC: 1.42 MG/DL — HIGH (ref 0.5–1.3)
CREAT SERPL-MCNC: 1.5 MG/DL — HIGH (ref 0.5–1.3)
DIFF PNL FLD: NEGATIVE — SIGNIFICANT CHANGE UP
GAS PNL BLDV: 121 MMOL/L — LOW (ref 136–145)
GAS PNL BLDV: 124 MMOL/L — LOW (ref 136–145)
GAS PNL BLDV: SIGNIFICANT CHANGE UP
GLUCOSE BLDC GLUCOMTR-MCNC: 134 MG/DL — HIGH (ref 70–99)
GLUCOSE BLDC GLUCOMTR-MCNC: 155 MG/DL — HIGH (ref 70–99)
GLUCOSE BLDC GLUCOMTR-MCNC: 176 MG/DL — HIGH (ref 70–99)
GLUCOSE BLDC GLUCOMTR-MCNC: 188 MG/DL — HIGH (ref 70–99)
GLUCOSE BLDC GLUCOMTR-MCNC: 201 MG/DL — HIGH (ref 70–99)
GLUCOSE BLDC GLUCOMTR-MCNC: 234 MG/DL — HIGH (ref 70–99)
GLUCOSE BLDC GLUCOMTR-MCNC: 260 MG/DL — HIGH (ref 70–99)
GLUCOSE BLDC GLUCOMTR-MCNC: 270 MG/DL — HIGH (ref 70–99)
GLUCOSE BLDC GLUCOMTR-MCNC: 313 MG/DL — HIGH (ref 70–99)
GLUCOSE BLDV-MCNC: 304 MG/DL — HIGH (ref 70–99)
GLUCOSE BLDV-MCNC: 349 MG/DL — HIGH (ref 70–99)
GLUCOSE SERPL-MCNC: 147 MG/DL — HIGH (ref 70–99)
GLUCOSE SERPL-MCNC: 179 MG/DL — HIGH (ref 70–99)
GLUCOSE SERPL-MCNC: 201 MG/DL — HIGH (ref 70–99)
GLUCOSE SERPL-MCNC: 297 MG/DL — HIGH (ref 70–99)
GLUCOSE SERPL-MCNC: 344 MG/DL — HIGH (ref 70–99)
GLUCOSE UR QL: 300 MG/DL
HCO3 BLDV-SCNC: 23 MMOL/L — SIGNIFICANT CHANGE UP (ref 21–29)
HCO3 BLDV-SCNC: 24 MMOL/L — SIGNIFICANT CHANGE UP (ref 21–29)
HCT VFR BLD CALC: 39.7 % — SIGNIFICANT CHANGE UP (ref 34.5–45)
HCT VFR BLDA CALC: 36 % — LOW (ref 39–50)
HCT VFR BLDA CALC: 50 % — SIGNIFICANT CHANGE UP (ref 39–50)
HGB BLD CALC-MCNC: 11.8 G/DL — SIGNIFICANT CHANGE UP (ref 11.5–15.5)
HGB BLD CALC-MCNC: 16.4 G/DL — HIGH (ref 11.5–15.5)
HGB BLD-MCNC: 12.4 G/DL — SIGNIFICANT CHANGE UP (ref 11.5–15.5)
HOROWITZ INDEX BLDV+IHG-RTO: 30 — SIGNIFICANT CHANGE UP
KETONES UR-MCNC: NEGATIVE — SIGNIFICANT CHANGE UP
LACTATE BLDV-MCNC: 1.8 MMOL/L — SIGNIFICANT CHANGE UP (ref 0.7–2)
LACTATE BLDV-MCNC: 1.8 MMOL/L — SIGNIFICANT CHANGE UP (ref 0.7–2)
LEUKOCYTE ESTERASE UR-ACNC: NEGATIVE — SIGNIFICANT CHANGE UP
MAGNESIUM SERPL-MCNC: 1.6 MG/DL — SIGNIFICANT CHANGE UP (ref 1.6–2.6)
MAGNESIUM SERPL-MCNC: 1.8 MG/DL — SIGNIFICANT CHANGE UP (ref 1.6–2.6)
MAGNESIUM SERPL-MCNC: 3 MG/DL — HIGH (ref 1.6–2.6)
MCHC RBC-ENTMCNC: 27.4 PG — SIGNIFICANT CHANGE UP (ref 27–34)
MCHC RBC-ENTMCNC: 31.3 GM/DL — LOW (ref 32–36)
MCV RBC AUTO: 87.5 FL — SIGNIFICANT CHANGE UP (ref 80–100)
NITRITE UR-MCNC: NEGATIVE — SIGNIFICANT CHANGE UP
OTHER CELLS CSF MANUAL: 11 ML/DL — LOW (ref 18–22)
OTHER CELLS CSF MANUAL: 14 ML/DL — LOW (ref 18–22)
PCO2 BLDV: 46 MMHG — SIGNIFICANT CHANGE UP (ref 35–50)
PCO2 BLDV: 50 MMHG — SIGNIFICANT CHANGE UP (ref 35–50)
PH BLDV: 7.3 — LOW (ref 7.35–7.45)
PH BLDV: 7.32 — LOW (ref 7.35–7.45)
PH UR: 6.5 — SIGNIFICANT CHANGE UP (ref 5–8)
PHOSPHATE SERPL-MCNC: 3.8 MG/DL — SIGNIFICANT CHANGE UP (ref 2.5–4.5)
PHOSPHATE SERPL-MCNC: 3.8 MG/DL — SIGNIFICANT CHANGE UP (ref 2.5–4.5)
PHOSPHATE SERPL-MCNC: 4.1 MG/DL — SIGNIFICANT CHANGE UP (ref 2.5–4.5)
PLATELET # BLD AUTO: 155 K/UL — SIGNIFICANT CHANGE UP (ref 150–400)
PO2 BLDV: 36 MMHG — SIGNIFICANT CHANGE UP (ref 25–45)
PO2 BLDV: 41 MMHG — SIGNIFICANT CHANGE UP (ref 25–45)
POTASSIUM BLDV-SCNC: 4.8 MMOL/L — SIGNIFICANT CHANGE UP (ref 3.5–5)
POTASSIUM BLDV-SCNC: 5.6 MMOL/L — HIGH (ref 3.5–5)
POTASSIUM SERPL-MCNC: 4.5 MMOL/L — SIGNIFICANT CHANGE UP (ref 3.5–5.3)
POTASSIUM SERPL-MCNC: 5.2 MMOL/L — SIGNIFICANT CHANGE UP (ref 3.5–5.3)
POTASSIUM SERPL-MCNC: 5.4 MMOL/L — HIGH (ref 3.5–5.3)
POTASSIUM SERPL-MCNC: 5.9 MMOL/L — HIGH (ref 3.5–5.3)
POTASSIUM SERPL-MCNC: 6.1 MMOL/L — HIGH (ref 3.5–5.3)
POTASSIUM SERPL-SCNC: 4.5 MMOL/L — SIGNIFICANT CHANGE UP (ref 3.5–5.3)
POTASSIUM SERPL-SCNC: 5.2 MMOL/L — SIGNIFICANT CHANGE UP (ref 3.5–5.3)
POTASSIUM SERPL-SCNC: 5.4 MMOL/L — HIGH (ref 3.5–5.3)
POTASSIUM SERPL-SCNC: 5.9 MMOL/L — HIGH (ref 3.5–5.3)
POTASSIUM SERPL-SCNC: 6.1 MMOL/L — HIGH (ref 3.5–5.3)
PROT UR-MCNC: NEGATIVE — SIGNIFICANT CHANGE UP
RBC # BLD: 4.54 M/UL — SIGNIFICANT CHANGE UP (ref 3.8–5.2)
RBC # FLD: 14.9 % — HIGH (ref 10.3–14.5)
SAO2 % BLDV: 60 % — LOW (ref 67–88)
SAO2 % BLDV: 70 % — SIGNIFICANT CHANGE UP (ref 67–88)
SODIUM SERPL-SCNC: 128 MMOL/L — LOW (ref 135–145)
SODIUM SERPL-SCNC: 130 MMOL/L — LOW (ref 135–145)
SODIUM SERPL-SCNC: 135 MMOL/L — SIGNIFICANT CHANGE UP (ref 135–145)
SP GR SPEC: 1.01 — LOW (ref 1.01–1.02)
UROBILINOGEN FLD QL: NEGATIVE — SIGNIFICANT CHANGE UP
WBC # BLD: 9.8 K/UL — SIGNIFICANT CHANGE UP (ref 3.8–10.5)
WBC # FLD AUTO: 9.8 K/UL — SIGNIFICANT CHANGE UP (ref 3.8–10.5)

## 2018-05-12 PROCEDURE — 49465 FLUORO EXAM OF G/COLON TUBE: CPT

## 2018-05-12 PROCEDURE — 99291 CRITICAL CARE FIRST HOUR: CPT

## 2018-05-12 PROCEDURE — 93010 ELECTROCARDIOGRAM REPORT: CPT

## 2018-05-12 RX ORDER — SODIUM POLYSTYRENE SULFONATE 4.1 MEQ/G
15 POWDER, FOR SUSPENSION ORAL ONCE
Qty: 0 | Refills: 0 | Status: COMPLETED | OUTPATIENT
Start: 2018-05-12 | End: 2018-05-12

## 2018-05-12 RX ORDER — HUMAN INSULIN 100 [IU]/ML
13 INJECTION, SUSPENSION SUBCUTANEOUS EVERY 6 HOURS
Qty: 0 | Refills: 0 | Status: DISCONTINUED | OUTPATIENT
Start: 2018-05-12 | End: 2018-05-12

## 2018-05-12 RX ORDER — DEXTROSE 50 % IN WATER 50 %
15 SYRINGE (ML) INTRAVENOUS ONCE
Qty: 0 | Refills: 0 | Status: DISCONTINUED | OUTPATIENT
Start: 2018-05-12 | End: 2018-05-12

## 2018-05-12 RX ORDER — GLUCAGON INJECTION, SOLUTION 0.5 MG/.1ML
1 INJECTION, SOLUTION SUBCUTANEOUS ONCE
Qty: 0 | Refills: 0 | Status: DISCONTINUED | OUTPATIENT
Start: 2018-05-12 | End: 2018-05-12

## 2018-05-12 RX ORDER — DEXTROSE 50 % IN WATER 50 %
25 SYRINGE (ML) INTRAVENOUS ONCE
Qty: 0 | Refills: 0 | Status: DISCONTINUED | OUTPATIENT
Start: 2018-05-12 | End: 2018-05-12

## 2018-05-12 RX ORDER — INSULIN HUMAN 100 [IU]/ML
3 INJECTION, SOLUTION SUBCUTANEOUS
Qty: 100 | Refills: 0 | Status: DISCONTINUED | OUTPATIENT
Start: 2018-05-12 | End: 2018-05-12

## 2018-05-12 RX ORDER — SODIUM CHLORIDE 9 MG/ML
1000 INJECTION, SOLUTION INTRAVENOUS
Qty: 0 | Refills: 0 | Status: DISCONTINUED | OUTPATIENT
Start: 2018-05-12 | End: 2018-05-12

## 2018-05-12 RX ORDER — ONDANSETRON 8 MG/1
4 TABLET, FILM COATED ORAL ONCE
Qty: 0 | Refills: 0 | Status: COMPLETED | OUTPATIENT
Start: 2018-05-12 | End: 2018-05-12

## 2018-05-12 RX ORDER — INSULIN LISPRO 100/ML
VIAL (ML) SUBCUTANEOUS EVERY 6 HOURS
Qty: 0 | Refills: 0 | Status: DISCONTINUED | OUTPATIENT
Start: 2018-05-12 | End: 2018-05-15

## 2018-05-12 RX ORDER — HUMAN INSULIN 100 [IU]/ML
13 INJECTION, SUSPENSION SUBCUTANEOUS ONCE
Qty: 0 | Refills: 0 | Status: COMPLETED | OUTPATIENT
Start: 2018-05-12 | End: 2018-05-12

## 2018-05-12 RX ORDER — HEPARIN SODIUM 5000 [USP'U]/ML
5000 INJECTION INTRAVENOUS; SUBCUTANEOUS EVERY 8 HOURS
Qty: 0 | Refills: 0 | Status: DISCONTINUED | OUTPATIENT
Start: 2018-05-12 | End: 2018-05-22

## 2018-05-12 RX ORDER — SODIUM POLYSTYRENE SULFONATE 4.1 MEQ/G
15 POWDER, FOR SUSPENSION ORAL ONCE
Qty: 0 | Refills: 0 | Status: DISCONTINUED | OUTPATIENT
Start: 2018-05-12 | End: 2018-05-12

## 2018-05-12 RX ORDER — MAGNESIUM SULFATE 500 MG/ML
1 VIAL (ML) INJECTION ONCE
Qty: 0 | Refills: 0 | Status: COMPLETED | OUTPATIENT
Start: 2018-05-12 | End: 2018-05-12

## 2018-05-12 RX ORDER — DEXTROSE 50 % IN WATER 50 %
12.5 SYRINGE (ML) INTRAVENOUS ONCE
Qty: 0 | Refills: 0 | Status: DISCONTINUED | OUTPATIENT
Start: 2018-05-12 | End: 2018-05-12

## 2018-05-12 RX ORDER — ASPIRIN/CALCIUM CARB/MAGNESIUM 324 MG
81 TABLET ORAL DAILY
Qty: 0 | Refills: 0 | Status: DISCONTINUED | OUTPATIENT
Start: 2018-05-12 | End: 2018-05-22

## 2018-05-12 RX ORDER — INSULIN HUMAN 100 [IU]/ML
2 INJECTION, SOLUTION SUBCUTANEOUS
Qty: 100 | Refills: 0 | Status: DISCONTINUED | OUTPATIENT
Start: 2018-05-12 | End: 2018-05-12

## 2018-05-12 RX ORDER — INSULIN LISPRO 100/ML
VIAL (ML) SUBCUTANEOUS
Qty: 0 | Refills: 0 | Status: DISCONTINUED | OUTPATIENT
Start: 2018-05-12 | End: 2018-05-12

## 2018-05-12 RX ORDER — HYDRALAZINE HCL 50 MG
10 TABLET ORAL EVERY 6 HOURS
Qty: 0 | Refills: 0 | Status: DISCONTINUED | OUTPATIENT
Start: 2018-05-12 | End: 2018-05-17

## 2018-05-12 RX ORDER — IPRATROPIUM/ALBUTEROL SULFATE 18-103MCG
3 AEROSOL WITH ADAPTER (GRAM) INHALATION EVERY 6 HOURS
Qty: 0 | Refills: 0 | Status: DISCONTINUED | OUTPATIENT
Start: 2018-05-12 | End: 2018-05-12

## 2018-05-12 RX ORDER — IPRATROPIUM/ALBUTEROL SULFATE 18-103MCG
3 AEROSOL WITH ADAPTER (GRAM) INHALATION EVERY 4 HOURS
Qty: 0 | Refills: 0 | Status: DISCONTINUED | OUTPATIENT
Start: 2018-05-12 | End: 2018-05-22

## 2018-05-12 RX ORDER — HUMAN INSULIN 100 [IU]/ML
13 INJECTION, SUSPENSION SUBCUTANEOUS EVERY 6 HOURS
Qty: 0 | Refills: 0 | Status: DISCONTINUED | OUTPATIENT
Start: 2018-05-12 | End: 2018-05-15

## 2018-05-12 RX ORDER — SODIUM POLYSTYRENE SULFONATE 4.1 MEQ/G
30 POWDER, FOR SUSPENSION ORAL ONCE
Qty: 0 | Refills: 0 | Status: COMPLETED | OUTPATIENT
Start: 2018-05-12 | End: 2018-05-12

## 2018-05-12 RX ORDER — SODIUM CHLORIDE 9 MG/ML
1000 INJECTION INTRAMUSCULAR; INTRAVENOUS; SUBCUTANEOUS
Qty: 0 | Refills: 0 | Status: DISCONTINUED | OUTPATIENT
Start: 2018-05-12 | End: 2018-05-12

## 2018-05-12 RX ADMIN — HUMAN INSULIN 13 UNIT(S): 100 INJECTION, SUSPENSION SUBCUTANEOUS at 11:15

## 2018-05-12 RX ADMIN — Medication 3 MILLILITER(S): at 22:09

## 2018-05-12 RX ADMIN — Medication 3 MILLILITER(S): at 13:22

## 2018-05-12 RX ADMIN — Medication 3 MILLILITER(S): at 17:26

## 2018-05-12 RX ADMIN — Medication 3 MILLILITER(S): at 10:38

## 2018-05-12 RX ADMIN — HEPARIN SODIUM 5000 UNIT(S): 5000 INJECTION INTRAVENOUS; SUBCUTANEOUS at 22:21

## 2018-05-12 RX ADMIN — ONDANSETRON 4 MILLIGRAM(S): 8 TABLET, FILM COATED ORAL at 07:02

## 2018-05-12 RX ADMIN — SODIUM POLYSTYRENE SULFONATE 15 GRAM(S): 4.1 POWDER, FOR SUSPENSION ORAL at 15:21

## 2018-05-12 RX ADMIN — HEPARIN SODIUM 5000 UNIT(S): 5000 INJECTION INTRAVENOUS; SUBCUTANEOUS at 15:21

## 2018-05-12 RX ADMIN — HEPARIN SODIUM 5000 UNIT(S): 5000 INJECTION INTRAVENOUS; SUBCUTANEOUS at 05:13

## 2018-05-12 RX ADMIN — SODIUM CHLORIDE 75 MILLILITER(S): 9 INJECTION INTRAMUSCULAR; INTRAVENOUS; SUBCUTANEOUS at 02:54

## 2018-05-12 RX ADMIN — INSULIN HUMAN 3 UNIT(S)/HR: 100 INJECTION, SOLUTION SUBCUTANEOUS at 04:26

## 2018-05-12 RX ADMIN — Medication 3 MILLILITER(S): at 05:46

## 2018-05-12 RX ADMIN — HUMAN INSULIN 13 UNIT(S): 100 INJECTION, SUSPENSION SUBCUTANEOUS at 18:00

## 2018-05-12 RX ADMIN — ONDANSETRON 4 MILLIGRAM(S): 8 TABLET, FILM COATED ORAL at 15:57

## 2018-05-12 RX ADMIN — Medication 2: at 18:00

## 2018-05-12 RX ADMIN — Medication 100 GRAM(S): at 04:26

## 2018-05-12 NOTE — PROGRESS NOTE ADULT - ATTENDING COMMENTS
68 F hx breast ca (stage 3 previously on herceptin), PEA arrest, ARDS, PE s/p tpa, CKD and trach admitted with bradycardia to 30s with junctional rhythm but maintained adequate BP. Was on both CCB and BB. Also noted to be hyperkalemic and hyperglycemic. Received atropine, ca and insulin gtt for bradycardia which promptly resolved. Now sinus 70s w/ good BP. No signs of infection. Resp status stable an w/ trach w/ speaking valve. No pressors needed. D/c insulin gtt -no evidence of DKA either. Restart basal long acting insulin. Repeat electrolytes but have improved K.

## 2018-05-12 NOTE — PROGRESS NOTE ADULT - SUBJECTIVE AND OBJECTIVE BOX
MICU Progress Note    INTERVAL HPI/OVERNIGHT EVENTS: No overnight events    SUBJECTIVE: Patient seen and examined at bedside.     OBJECTIVE:    VITAL SIGNS:  ICU Vital Signs Last 24 Hrs  T(C): 36.3 (12 May 2018 04:00), Max: 37.9 (11 May 2018 18:42)  T(F): 97.4 (12 May 2018 04:00), Max: 100.2 (11 May 2018 18:42)  HR: 77 (12 May 2018 04:00) (34 - 78)  BP: 101/46 (12 May 2018 04:00) (101/46 - 175/88)  BP(mean): 66 (12 May 2018 04:00) (66 - 120)  ABP: --  ABP(mean): --  RR: 19 (12 May 2018 04:00) (16 - 28)  SpO2: 98% (12 May 2018 04:00) (96% - 99%)        05-11 @ 07:01  -  05-12 @ 05:16  --------------------------------------------------------  IN: 328 mL / OUT: 630 mL / NET: -302 mL      CAPILLARY BLOOD GLUCOSE      POCT Blood Glucose.: 260 mg/dL (12 May 2018 04:59)      PHYSICAL EXAM:    General: NAD  HEENT: NC/AT; PERRL, clear conjunctiva  Neck: supple, tracheostomy intact  Respiratory: CTA b/l  Cardiovascular: +S1/S2; RRR  Abdomen: soft, NT/ND; +BS x4  Extremities: WWP, 2+ peripheral pulses b/l; no LE edema  Skin: normal color and turgor; no rash  Neurological: AAOx3    MEDICATIONS:  MEDICATIONS  (STANDING):  ALBUTerol/ipratropium for Nebulization 3 milliLiter(s) Nebulizer every 4 hours  aspirin  chewable 81 milliGRAM(s) Oral daily  heparin  Injectable 5000 Unit(s) SubCutaneous every 8 hours  insulin Infusion 3 Unit(s)/Hr (3 mL/Hr) IV Continuous <Continuous>  sodium chloride 0.9%. 1000 milliLiter(s) (75 mL/Hr) IV Continuous <Continuous>    MEDICATIONS  (PRN):  hydrALAZINE Injectable 10 milliGRAM(s) IV Push every 6 hours PRN SBP > 160      ALLERGIES:  Allergies    doxycycline (Rash)  isoniazid (Rash)  NIFEdipine (Urticaria; Hives)  vitamin E (Short breath; Urticaria; Hives)    Intolerances        LABS:                        12.4   9.8   )-----------( 155      ( 12 May 2018 02:46 )             39.7     CBC Full  -  ( 12 May 2018 02:46 )  WBC Count : 9.8 K/uL  Hemoglobin : 12.4 g/dL  Hematocrit : 39.7 %  Platelet Count - Automated : 155 K/uL  Mean Cell Volume : 87.5 fl  Mean Cell Hemoglobin : 27.4 pg  Mean Cell Hemoglobin Concentration : 31.3 gm/dL  Auto Neutrophil # : x  Auto Lymphocyte # : x  Auto Monocyte # : x  Auto Eosinophil # : x  Auto Basophil # : x  Auto Neutrophil % : x  Auto Lymphocyte % : x  Auto Monocyte % : x  Auto Eosinophil % : x  Auto Basophil % : x    05-12    128<L>  |  96  |  28<H>  ----------------------------<  297<H>  6.1<H>   |  20<L>  |  1.42<H>    Ca    10.7<H>      12 May 2018 02:46  Phos  4.1     12  Mg     1.6         TPro  6.2  /  Alb  3.2<L>  /  TBili  0.2  /  DBili  x   /  AST  15  /  ALT  22  /  AlkPhos  184<H>  -11    Creatinine Trend: 1.42<--, 1.50<--, 1.57<--, 0.61<--, 1.43<--  LIVER FUNCTIONS - ( 11 May 2018 21:07 )  Alb: 3.2 g/dL / Pro: 6.2 g/dL / ALK PHOS: 184 U/L / ALT: 22 U/L / AST: 15 U/L / GGT: x           PT/INR - ( 11 May 2018 21:56 )   PT: 11.1 sec;   INR: 1.03 ratio         PTT - ( 11 May 2018 21:56 )  PTT:23.9 sec  CARDIAC MARKERS ( 11 May 2018 18:27 )  x     / 0.02 ng/mL / 77 U/L / x     / 1.0 ng/mL        Urinalysis Basic - ( 12 May 2018 02:46 )    Color: Colorless / Appearance: Clear / S.007 / pH: x  Gluc: x / Ketone: Negative  / Bili: Negative / Urobili: Negative   Blood: x / Protein: Negative / Nitrite: Negative   Leuk Esterase: Negative / RBC: x / WBC x   Sq Epi: x / Non Sq Epi: x / Bacteria: x        MICROBIOLOGY:      RADIOLOGY & ADDITIONAL TESTS: Reviewed. MICU Progress Note    INTERVAL HPI/OVERNIGHT EVENTS: Overnight pt's K+ corrected on VBG to 4.8. Pt currently c/o nausea, but has not vomited    SUBJECTIVE: Patient seen and examined at bedside.     OBJECTIVE:    VITAL SIGNS:  ICU Vital Signs Last 24 Hrs  T(C): 36.3 (12 May 2018 04:00), Max: 37.9 (11 May 2018 18:42)  T(F): 97.4 (12 May 2018 04:00), Max: 100.2 (11 May 2018 18:42)  HR: 77 (12 May 2018 04:00) (34 - 78)  BP: 101/46 (12 May 2018 04:00) (101/46 - 175/88)  BP(mean): 66 (12 May 2018 04:00) (66 - 120)  ABP: --  ABP(mean): --  RR: 19 (12 May 2018 04:00) (16 - 28)  SpO2: 98% (12 May 2018 04:00) (96% - 99%)        05-11 @ 07:01  -  05-12 @ 05:16  --------------------------------------------------------  IN: 328 mL / OUT: 630 mL / NET: -302 mL      CAPILLARY BLOOD GLUCOSE      POCT Blood Glucose.: 260 mg/dL (12 May 2018 04:59)      PHYSICAL EXAM:    General: NAD  HEENT: NC/AT; PERRL, clear conjunctiva  Neck: supple, tracheostomy intact  Respiratory: CTA b/l  Cardiovascular: +S1/S2; RRR  Abdomen: soft, mildly diffusely ttp, PEG tube intact on right side  Extremities: WWP, 2+ peripheral pulses b/l; no LE edema  Skin: normal color and turgor; no rash  Neurological: A&Ox3    MEDICATIONS:  MEDICATIONS  (STANDING):  ALBUTerol/ipratropium for Nebulization 3 milliLiter(s) Nebulizer every 4 hours  aspirin  chewable 81 milliGRAM(s) Oral daily  heparin  Injectable 5000 Unit(s) SubCutaneous every 8 hours  insulin Infusion 3 Unit(s)/Hr (3 mL/Hr) IV Continuous <Continuous>  sodium chloride 0.9%. 1000 milliLiter(s) (75 mL/Hr) IV Continuous <Continuous>    MEDICATIONS  (PRN):  hydrALAZINE Injectable 10 milliGRAM(s) IV Push every 6 hours PRN SBP > 160      ALLERGIES:  Allergies    doxycycline (Rash)  isoniazid (Rash)  NIFEdipine (Urticaria; Hives)  vitamin E (Short breath; Urticaria; Hives)    Intolerances        LABS:                        12.4   9.8   )-----------( 155      ( 12 May 2018 02:46 )             39.7     CBC Full  -  ( 12 May 2018 02:46 )  WBC Count : 9.8 K/uL  Hemoglobin : 12.4 g/dL  Hematocrit : 39.7 %  Platelet Count - Automated : 155 K/uL  Mean Cell Volume : 87.5 fl  Mean Cell Hemoglobin : 27.4 pg  Mean Cell Hemoglobin Concentration : 31.3 gm/dL  Auto Neutrophil # : x  Auto Lymphocyte # : x  Auto Monocyte # : x  Auto Eosinophil # : x  Auto Basophil # : x  Auto Neutrophil % : x  Auto Lymphocyte % : x  Auto Monocyte % : x  Auto Eosinophil % : x  Auto Basophil % : x    05-12    128<L>  |  96  |  28<H>  ----------------------------<  297<H>  6.1<H>   |  20<L>  |  1.42<H>    Ca    10.7<H>      12 May 2018 02:46  Phos  4.1     12  Mg     1.6         TPro  6.2  /  Alb  3.2<L>  /  TBili  0.2  /  DBili  x   /  AST  15  /  ALT  22  /  AlkPhos  184<H>  11    Creatinine Trend: 1.42<--, 1.50<--, 1.57<--, 0.61<--, 1.43<--  LIVER FUNCTIONS - ( 11 May 2018 21:07 )  Alb: 3.2 g/dL / Pro: 6.2 g/dL / ALK PHOS: 184 U/L / ALT: 22 U/L / AST: 15 U/L / GGT: x           PT/INR - ( 11 May 2018 21:56 )   PT: 11.1 sec;   INR: 1.03 ratio         PTT - ( 11 May 2018 21:56 )  PTT:23.9 sec  CARDIAC MARKERS ( 11 May 2018 18:27 )  x     / 0.02 ng/mL / 77 U/L / x     / 1.0 ng/mL        Urinalysis Basic - ( 12 May 2018 02:46 )    Color: Colorless / Appearance: Clear / S.007 / pH: x  Gluc: x / Ketone: Negative  / Bili: Negative / Urobili: Negative   Blood: x / Protein: Negative / Nitrite: Negative   Leuk Esterase: Negative / RBC: x / WBC x   Sq Epi: x / Non Sq Epi: x / Bacteria: x        MICROBIOLOGY:      RADIOLOGY & ADDITIONAL TESTS: Reviewed.

## 2018-05-12 NOTE — PROGRESS NOTE ADULT - ASSESSMENT
67 y/o F w/ hx of DM2, HTN, breast CA (dx in 2/2017), w recent hospitalization for respiratory arrest 2/2 influenza, complicated by aspiration pna, PEA arrest x2 with ROS, PE s/p tPA, ARF requiring HD, s/p trach and PEG tube presents with bradycardia and EKG concerning for arrythmia 2/2 medication side effect vs hyperkalemia.     #Neuro  - pt is at baseline mental status  - no active issues    #CV  - bradycardic to 30s in ED with EGK concerning for junctional rhythm and peaked T waves. Improved after temporizing measures. Concern for juanita agent toxicity (pt on both beta blockers and CCBs) in setting of CKD and hyperkalemia, elevated blood glucose  - continuous telemetry  - atropine at bedside PRN, external pacing pads on pt  - trend Kika  - BMP Q4H  - hydralazine 10mg IV Q6H PRN for SBP > 160    #Pulm  - s/p tracheostomy for chronic hypoxemic respiratory failure  - will check ABG, and continued with trach collar  - duonebs Q6H    #Endo  - VBG on admission showed pH of 7.24, PCO2 of 47, indicating metabolic acidosis. Blood glucose improving  - cw insulin gtt, fingersticks Q1H pending resolution of hyperkalemia  - betahydroxy butyrate and acetone negative  - f/u Hgb A1C    #Renal  - pt has hx of ESRD requiring HD during previous admission, now off HD but w CKD. Serum Cr 1.57  - repeat BMP showed elevated K to 6.1, however that was prior to starting insulin gtt. Will recheck 4 hours after starting gtt  - Strict I&Os, cw lasix PRN  - renally dose all meds, avoid nephrotoxins    #FEN/GI  - NPO while on insulin gtt    #ID  - WBC wnl and afebrile  - blood and urine cx sent, f/u results  - will cont to monitor off abx for now    #PPX  - heparin subQ 69 y/o F w/ hx of DM2, HTN, breast CA (dx in 2/2017), w recent hospitalization for respiratory arrest 2/2 influenza, complicated by aspiration pna, PEA arrest x2 with ROS, PE s/p tPA, ARF requiring HD, s/p trach and PEG tube presents with bradycardia and EKG concerning for arrythmia 2/2 medication side effect vs hyperkalemia.     #Neuro  - pt is at baseline mental status  - no active issues    #CV  - bradycardic to 30s in ED with EGK concerning for junctional rhythm and peaked T waves. Improved after temporizing measures. Concern for juanita agent toxicity (pt on both beta blockers and CCBs) in setting of CKD and hyperkalemia, elevated blood glucose  - continuous telemetry  - atropine at bedside PRN, external pacing pads on pt  - K+ improving, c/w BMP Q4H  - hydralazine 10mg IV Q6H PRN for SBP > 160    #Pulm  - s/p tracheostomy for chronic hypoxemic respiratory failure  - pCO2 wnl on VBG, pH now 3.32, increased from 7.14 on admission  - will c/w trach collar  - duonebs Q6H    #Endo  - VBG on admission showed pH of 7.24, PCO2 of 47, indicating metabolic acidosis. Blood glucose improving  - cw insulin gtt, fingersticks Q1H pending resolution of hyperkalemia  - betahydroxy butyrate and acetone negative  - f/u Hgb A1C    #Renal  - pt has hx of ESRD requiring HD during previous admission, now off HD but w CKD. Serum Cr 1.57 on admission, currently downtrending  - K+ on VBG now 4.8, continue to check BMP Q4H  - Strict I&Os, cw lasix PRN  - renally dose all meds, avoid nephrotoxins    #FEN/GI  - NPO while on insulin gtt  - Pt has a PEG tube, but per family at bedside, it has not been used for weeks and was scheduled to be removed. Will need assessment to determine if it's still usable.     #ID  - WBC wnl and afebrile  - blood and urine cx sent, f/u results  - will cont to monitor off abx for now    #PPX  - heparin subQ

## 2018-05-12 NOTE — CHART NOTE - NSCHARTNOTEFT_GEN_A_CORE
MICU Transfer Note    Transfer from: MICU    Transfer to: (  ) Medicine    ( x ) Telemetry     (   ) RCU        (    ) Palliative         (   ) Stroke Unit          (   ) __________________    Accepting Physician: Dr. Patel  Signout given to:     MICU COURSE:   67 y/o F w/ hx of DM2, HTN, breast CA (dx in 2/2017), w recent hospitalization for respiratory arrest 2/2 influenza, complicated by aspiration pna, PEA arrest x2 with ROS, PE s/p tPA, ARF requiring HD, s/p trach and PEG tube presents with bradycardia and shortness of breath. Pt was brought in from rehab after found to be bradycardic w/ HR in 30s. Hx obtain via pt and family at bedside. Pt c/o worsening shortness of breath and chest pain today. She has been taking carvedilol, but extended release diltiazem was recently added to her medication regimen after the daughter asked for pt to be taken off clonidine for hypertension.             ASSESSMENT & PLAN:             FOR FOLLOW UP: MICU Transfer Note    Transfer from: MICU    Transfer to: (  ) Medicine    ( x ) Telemetry     (   ) RCU        (    ) Palliative         (   ) Stroke Unit          (   ) __________________    Accepting Physician: Dr. Patel  Signout given to:     MICU COURSE:   67 y/o F w/ hx of DM2, HTN, breast CA (dx in 2/2017), w recent hospitalization for respiratory arrest 2/2 influenza, complicated by aspiration pna, PEA arrest x2 with ROS, PE s/p tPA, ARF requiring HD, s/p trach and PEG tube presents with bradycardia and shortness of breath. Pt was admitted to the MICU for bradycardia in the setting of RUSS and hyperkalemia. Bradycardia thought to be related to medication switch as pt was recently discontinued on clonidine and started on diltiazem. Pt was treated with temporizing measures in the ED including atropine 0.5mg x2, 10U insulin +D50, calcium gluconate x4, NaHCO3 x1, and lasix 40mg IV with improvement in heart rate and hyperkalemia. Blood glucose noted to be persistently elevated therefore pt was started on an insulin gtt and transitioned to NPH. Xray confirmed PEG tube placement, feed were restarted.           ASSESSMENT & PLAN:   67 y/o F w/ hx of DM2, HTN, breast CA (dx in 2/2017), w recent hospitalization for respiratory arrest 2/2 influenza, complicated by aspiration pna, PEA arrest x2 with ROS, PE s/p tPA, ARF requiring HD, s/p trach and PEG tube presents with bradycardia and EKG concerning for arrythmia 2/2 medication side effect vs hyperkalemia.     #Neuro  - pt is at baseline mental status  - no active issues    #CV  - bradycardic to 30s in ED with EGK concerning for junctional rhythm and peaked T waves. Improved after temporizing measures. Concern for juanita agent toxicity (pt on both beta blockers and CCBs) in setting of CKD and hyperkalemia, elevated blood glucose. Now in NSR after temporizing measures and normalization of electrolytes  - continuous telemetry  - atropine at bedside PRN, external pacing pads on pt  - trend Kika  - BMP Q4H  - hydralazine 10mg IV Q6H PRN for SBP > 160    #Pulm  - s/p tracheostomy for chronic hypoxemic respiratory failure  - continue with trach collar  - duonebs Q6H    #Endo  - VBG on admission showed pH of 7.24, PCO2 of 47, indicating metabolic acidosis. Concern for mild DKA given elevated blood glucose of 362, now 313.  -serum betahydroxy butyrate and acetone negative, not in DKA  -off insulin gtt, transitioned to NPH. Restarted tube feeds    #Renal  - pt has hx of ESRD requiring HD during previous admission, now off HD but w CKD. Serum Cr 1.57  - hyperkalemic to 6.9 in ED, most recent BMP K of 5.2. Will give Kayexalate x1. Continue to trend  - Strict I&Os, cw lasix PRN  - renally dose all meds, avoid nephrotoxins      #FEN/GI  - PEG confirmed in place by Xray  -continue with tube feeds      #ID  -WBC wnl and afebrile on presentation  - blood and urine cx sent, f/u results  -will cont to monitor off abx for now    #PPX  - heparin subQ          FOR FOLLOW UP:  -continue to trend BMP, Scr. If no improvement, consider renal consult  -Follow up official speech and swallow. If can tolerate feeds, can discuss with GI d/cing PEG  -follow up cardiology recs (consulted) MICU Transfer Note    Transfer from: MICU    Transfer to: (  ) Medicine    ( x ) Telemetry     (   ) RCU        (    ) Palliative         (   ) Stroke Unit          (   ) __________________    Accepting Physician: Dr. Patel  Signout given to:     MICU COURSE:   67 y/o F w/ hx of DM2, HTN, breast CA (dx in 2/2017), w recent hospitalization for respiratory arrest 2/2 influenza, complicated by aspiration pna, PEA arrest x2 with ROS, PE s/p tPA, ARF requiring HD, s/p trach and PEG tube presents with bradycardia and shortness of breath. Pt was admitted to the MICU for bradycardia in the setting of RUSS and hyperkalemia. Bradycardia thought to be related to medication switch as pt was recently discontinued on clonidine and started on diltiazem. Pt was treated with temporizing measures in the ED including atropine 0.5mg x2, 10U insulin +D50, calcium gluconate x4, NaHCO3 x1, and lasix 40mg IV with improvement in heart rate and hyperkalemia. Blood glucose noted to be persistently elevated therefore pt was started on an insulin gtt and transitioned to NPH. Xray confirmed PEG tube placement, feed were restarted.           ASSESSMENT & PLAN:   67 y/o F w/ hx of DM2, HTN, breast CA (dx in 2/2017), w recent hospitalization for respiratory arrest 2/2 influenza, complicated by aspiration pna, PEA arrest x2 with ROS, PE s/p tPA, ARF requiring HD, s/p trach and PEG tube presents with bradycardia and EKG concerning for arrythmia 2/2 medication side effect vs hyperkalemia.     #Neuro  - pt is at baseline mental status  - no active issues    #CV  - bradycardic to 30s in ED with EGK concerning for junctional rhythm and peaked T waves. Improved after temporizing measures. Concern for juanita agent toxicity (pt on both beta blockers and CCBs) in setting of CKD and hyperkalemia, elevated blood glucose. Now in NSR after temporizing measures and normalization of electrolytes  - continuous telemetry  - atropine at bedside PRN, external pacing pads on pt  - trend Kika  - BMP Q4H  - hydralazine 10mg IV Q6H PRN for SBP > 160    #Pulm  - s/p tracheostomy for chronic hypoxemic respiratory failure  - continue with trach collar  - duonebs Q6H    #Endo  - VBG on admission showed pH of 7.24, PCO2 of 47, indicating metabolic acidosis. Concern for mild DKA given elevated blood glucose of 362, now 313.  -serum betahydroxy butyrate and acetone negative, not in DKA  -off insulin gtt, transitioned to NPH. Restarted tube feeds    #Renal  - pt has hx of ESRD requiring HD during previous admission, now off HD but w CKD. Serum Cr 1.57  - hyperkalemic to 6.9 in ED, most recent BMP K of 5.2. Will give Kayexalate x1. Continue to trend  - Strict I&Os, cw lasix PRN  - renally dose all meds, avoid nephrotoxins      #FEN/GI  - PEG confirmed in place by Xray  -continue with tube feeds      #ID  -WBC wnl and afebrile on presentation  - blood and urine cx sent, f/u results  -will cont to monitor off abx for now    #Skin  -erythematous papule with firm consistency, small white dots (pus?)  -will treat with topical bacitracin. If no improvement, consider derm consult    #PPX  - heparin subQ          FOR FOLLOW UP:  -continue to trend BMP, Scr. If no improvement, consider renal consult  -Follow up official speech and swallow. If can tolerate feeds, can discuss with GI d/cing PEG  -follow up cardiology recs (consulted) MICU Transfer Note    Transfer from: MICU    Transfer to: (  ) Medicine    ( x ) Telemetry     (   ) RCU        (    ) Palliative         (   ) Stroke Unit          (   ) __________________    Accepting Physician: Dr. Patel  Signout given to:     MICU COURSE:   69 y/o F w/ hx of DM2, HTN, breast CA (dx in 2/2017), w recent hospitalization for respiratory arrest 2/2 influenza, complicated by aspiration pna, PEA arrest x2 with ROS, PE s/p tPA, ARF requiring HD, s/p trach and PEG tube presents with bradycardia and shortness of breath. Pt was admitted to the MICU for bradycardia in the setting of RUSS and hyperkalemia. Bradycardia thought to be related to medication switch as pt was recently discontinued on clonidine and started on diltiazem. Pt was treated with temporizing measures in the ED including atropine 0.5mg x2, 10U insulin +D50, calcium gluconate x4, NaHCO3 x1, and lasix 40mg IV with improvement in heart rate and hyperkalemia. Blood glucose noted to be persistently elevated therefore pt was started on an insulin gtt and transitioned to NPH. Xray confirmed PEG tube placement, feed were restarted.           ASSESSMENT & PLAN:   69 y/o F w/ hx of DM2, HTN, breast CA (dx in 2/2017), w recent hospitalization for respiratory arrest 2/2 influenza, complicated by aspiration pna, PEA arrest x2 with ROS, PE s/p tPA, ARF requiring HD, s/p trach and PEG tube presents with bradycardia and EKG concerning for arrythmia 2/2 medication side effect vs hyperkalemia.     #Neuro  - pt is at baseline mental status  - no active issues    #CV  - bradycardic to 30s in ED with Ekg concerning for junctional rhythm and peaked T waves. Improved after temporizing measures. Concern for juanita agent toxicity (pt on both beta blockers and CCBs) in setting of CKD and hyperkalemia, elevated blood glucose. Now in NSR after temporizing measures and normalization of electrolytes  Cardiology is following   Bradycardia now resolved Carvedilol resumed    continue to follow daily     #Pulm  - s/p tracheostomy for chronic hypoxemic respiratory failure  - continue with trach collar  - duonebs Q6H    #Endo  - VBG on admission showed pH of 7.24, PCO2 of 47, indicating metabolic acidosis. Concern for mild DKA given elevated blood glucose of 362, now 313.  -serum beta-hydroxylase butyrate and acetone negative, not in DKA  -off insulin gtt, transitioned to NPH. Restarted tube feeds    #Renal  - pt has hx of ESRD requiring HD during previous admission, now off HD but w CKD. Serum Cr 1.57  - hyperkalemic to 6.9 in ED, most recent BMP K of 5.2. Will give Kayexalate x1. Continue to trend  - Strict I&Os, cw lasix PRN  - renally dose all meds, avoid nephrotoxins      #FEN/GI  - PEG confirmed in place by Xray  -continue with tube feeds      #ID  -WBC wnl and afebrile on presentation  - blood and urine cx sent, f/u results  -will cont to monitor off abx for now    #Skin  -erythematous papule with firm consistency, small white dots (pus?) s/p ID today   - vanco x 1 given please follow up with van level   -will treat with topical bacitracin. If no improvement, consider derm consult    #PPX  - heparin subQ          FOR FOLLOW UP:  -continue to trend BMP, Scr. If no improvement, consider renal consult  - please follow up with speech and swallow evaluation   -follow up cardiology recs (consulted) MICU Transfer Note    Transfer from: MICU    Transfer to: (  ) Medicine    ( x ) Telemetry     (   ) RCU        (    ) Palliative         (   ) Stroke Unit          (   ) __________________    Accepting Physician: Dr. Patel  Signout given to:     MICU COURSE:   67 y/o F w/ hx of DM2, HTN, breast CA (dx in 2/2017), w recent hospitalization for respiratory arrest 2/2 influenza, complicated by aspiration pna, PEA arrest x2 with ROS, PE s/p tPA, ARF requiring HD, s/p trach and PEG tube presents with bradycardia and shortness of breath. Pt was admitted to the MICU for bradycardia in the setting of RUSS and hyperkalemia. Bradycardia thought to be related to medication switch as pt was recently discontinued on clonidine and started on diltiazem. Pt was treated with temporizing measures in the ED including atropine 0.5mg x2, 10U insulin +D50, calcium gluconate x4, NaHCO3 x1, and lasix 40mg IV with improvement in heart rate and hyperkalemia. Blood glucose noted to be persistently elevated therefore pt was started on an insulin gtt and transitioned to NPH. Xray confirmed PEG tube placement, feed were restarted.           ASSESSMENT & PLAN:   67 y/o F w/ hx of DM2, HTN, breast CA (dx in 2/2017), w recent hospitalization for respiratory arrest 2/2 influenza, complicated by aspiration pna, PEA arrest x2 with ROS, PE s/p tPA, ARF requiring HD, s/p trach and PEG tube presents with bradycardia and EKG concerning for arrythmia 2/2 medication side effect vs hyperkalemia.     #Neuro  - pt is at baseline mental status  - no active issues    #CV  - bradycardic to 30s in ED with Ekg concerning for junctional rhythm and peaked T waves. Improved after temporizing measures. Concern for juanita agent toxicity (pt on both beta blockers and CCBs) in setting of CKD and hyperkalemia, elevated blood glucose. Now in NSR after temporizing measures and normalization of electrolytes  Cardiology is following   Bradycardia now resolved Carvedilol resumed    continue to follow daily     #Pulm  - s/p tracheostomy for chronic hypoxemic respiratory failure  - continue with trach collar  - duonebs Q6H    #Endo  - VBG on admission showed pH of 7.24, PCO2 of 47, indicating metabolic acidosis. Concern for mild DKA given elevated blood glucose of 362, now 313.  -serum beta-hydroxylase butyrate and acetone negative, not in DKA  -off insulin gtt, transitioned to NPH. Restarted tube feeds    #Renal  - pt has hx of ESRD requiring HD during previous admission, now off HD but w CKD. Serum Cr 1.57  - hyperkalemic to 6.9 in ED, most recent BMP K of 5.2. Will give Kayexalate x1. Continue to trend  - Strict I&Os, cw lasix PRN  - renally dose all meds, avoid nephrotoxins      #FEN/GI  - PEG confirmed in place by Xray  -continue with tube feeds      #ID  -WBC wnl and afebrile on presentation  - blood and urine cx sent, f/u results  -will cont to monitor off abx for now    #Skin  -erythematous papule with firm consistency, small white dots (pus?) s/p ID today   - vanco x 1 given please follow up with van level   -will treat with topical bacitracin. If no improvement, consider derm consult    #PPX  - heparin subQ          FOR FOLLOW UP:  -continue to trend BMP, Scr. If no improvement, consider renal consult  - please follow up with speech and swallow evaluation possible discontinuation of PEG   -follow up cardiology recs (consulted)  Please follow up with wound care for Lower back abscess

## 2018-05-13 LAB
ACETONE SERPL-MCNC: NEGATIVE — SIGNIFICANT CHANGE UP
ANION GAP SERPL CALC-SCNC: 14 MMOL/L — SIGNIFICANT CHANGE UP (ref 5–17)
BUN SERPL-MCNC: 21 MG/DL — SIGNIFICANT CHANGE UP (ref 7–23)
CALCIUM SERPL-MCNC: 9.7 MG/DL — SIGNIFICANT CHANGE UP (ref 8.4–10.5)
CHLORIDE SERPL-SCNC: 98 MMOL/L — SIGNIFICANT CHANGE UP (ref 96–108)
CO2 SERPL-SCNC: 23 MMOL/L — SIGNIFICANT CHANGE UP (ref 22–31)
CREAT SERPL-MCNC: 1.04 MG/DL — SIGNIFICANT CHANGE UP (ref 0.5–1.3)
GLUCOSE BLDC GLUCOMTR-MCNC: 142 MG/DL — HIGH (ref 70–99)
GLUCOSE BLDC GLUCOMTR-MCNC: 144 MG/DL — HIGH (ref 70–99)
GLUCOSE BLDC GLUCOMTR-MCNC: 146 MG/DL — HIGH (ref 70–99)
GLUCOSE BLDC GLUCOMTR-MCNC: 154 MG/DL — HIGH (ref 70–99)
GLUCOSE BLDC GLUCOMTR-MCNC: 193 MG/DL — HIGH (ref 70–99)
GLUCOSE SERPL-MCNC: 152 MG/DL — HIGH (ref 70–99)
HCT VFR BLD CALC: 40.5 % — SIGNIFICANT CHANGE UP (ref 34.5–45)
HGB BLD-MCNC: 13.1 G/DL — SIGNIFICANT CHANGE UP (ref 11.5–15.5)
MAGNESIUM SERPL-MCNC: 1.7 MG/DL — SIGNIFICANT CHANGE UP (ref 1.6–2.6)
MCHC RBC-ENTMCNC: 28.1 PG — SIGNIFICANT CHANGE UP (ref 27–34)
MCHC RBC-ENTMCNC: 32.2 GM/DL — SIGNIFICANT CHANGE UP (ref 32–36)
MCV RBC AUTO: 87.2 FL — SIGNIFICANT CHANGE UP (ref 80–100)
PHOSPHATE SERPL-MCNC: 4.8 MG/DL — HIGH (ref 2.5–4.5)
PLATELET # BLD AUTO: 244 K/UL — SIGNIFICANT CHANGE UP (ref 150–400)
POTASSIUM SERPL-MCNC: 4.4 MMOL/L — SIGNIFICANT CHANGE UP (ref 3.5–5.3)
POTASSIUM SERPL-SCNC: 4.4 MMOL/L — SIGNIFICANT CHANGE UP (ref 3.5–5.3)
RBC # BLD: 4.65 M/UL — SIGNIFICANT CHANGE UP (ref 3.8–5.2)
RBC # FLD: 14.7 % — HIGH (ref 10.3–14.5)
SODIUM SERPL-SCNC: 135 MMOL/L — SIGNIFICANT CHANGE UP (ref 135–145)
WBC # BLD: 7.7 K/UL — SIGNIFICANT CHANGE UP (ref 3.8–10.5)
WBC # FLD AUTO: 7.7 K/UL — SIGNIFICANT CHANGE UP (ref 3.8–10.5)

## 2018-05-13 PROCEDURE — 99233 SBSQ HOSP IP/OBS HIGH 50: CPT

## 2018-05-13 PROCEDURE — 10061 I&D ABSCESS COMP/MULTIPLE: CPT | Mod: GC

## 2018-05-13 RX ORDER — CARVEDILOL PHOSPHATE 80 MG/1
6.25 CAPSULE, EXTENDED RELEASE ORAL EVERY 12 HOURS
Qty: 0 | Refills: 0 | Status: DISCONTINUED | OUTPATIENT
Start: 2018-05-13 | End: 2018-05-14

## 2018-05-13 RX ORDER — VANCOMYCIN HCL 1 G
1000 VIAL (EA) INTRAVENOUS EVERY 12 HOURS
Qty: 0 | Refills: 0 | Status: DISCONTINUED | OUTPATIENT
Start: 2018-05-13 | End: 2018-05-13

## 2018-05-13 RX ORDER — PIPERACILLIN AND TAZOBACTAM 4; .5 G/20ML; G/20ML
3.38 INJECTION, POWDER, LYOPHILIZED, FOR SOLUTION INTRAVENOUS EVERY 8 HOURS
Qty: 0 | Refills: 0 | Status: DISCONTINUED | OUTPATIENT
Start: 2018-05-13 | End: 2018-05-16

## 2018-05-13 RX ORDER — BACITRACIN ZINC 500 UNIT/G
1 OINTMENT IN PACKET (EA) TOPICAL DAILY
Qty: 0 | Refills: 0 | Status: DISCONTINUED | OUTPATIENT
Start: 2018-05-13 | End: 2018-05-22

## 2018-05-13 RX ORDER — ACETAMINOPHEN 500 MG
650 TABLET ORAL ONCE
Qty: 0 | Refills: 0 | Status: COMPLETED | OUTPATIENT
Start: 2018-05-13 | End: 2018-05-13

## 2018-05-13 RX ORDER — VANCOMYCIN HCL 1 G
1000 VIAL (EA) INTRAVENOUS ONCE
Qty: 0 | Refills: 0 | Status: COMPLETED | OUTPATIENT
Start: 2018-05-13 | End: 2018-05-13

## 2018-05-13 RX ADMIN — Medication 81 MILLIGRAM(S): at 12:09

## 2018-05-13 RX ADMIN — Medication 3 MILLILITER(S): at 09:18

## 2018-05-13 RX ADMIN — CARVEDILOL PHOSPHATE 6.25 MILLIGRAM(S): 80 CAPSULE, EXTENDED RELEASE ORAL at 23:48

## 2018-05-13 RX ADMIN — Medication 3 MILLILITER(S): at 13:14

## 2018-05-13 RX ADMIN — Medication 1 APPLICATION(S): at 16:25

## 2018-05-13 RX ADMIN — Medication 10 MILLIGRAM(S): at 10:30

## 2018-05-13 RX ADMIN — Medication 3 MILLILITER(S): at 21:07

## 2018-05-13 RX ADMIN — HUMAN INSULIN 13 UNIT(S): 100 INJECTION, SUSPENSION SUBCUTANEOUS at 18:43

## 2018-05-13 RX ADMIN — HUMAN INSULIN 13 UNIT(S): 100 INJECTION, SUSPENSION SUBCUTANEOUS at 00:52

## 2018-05-13 RX ADMIN — Medication 3 MILLILITER(S): at 05:37

## 2018-05-13 RX ADMIN — HUMAN INSULIN 13 UNIT(S): 100 INJECTION, SUSPENSION SUBCUTANEOUS at 23:48

## 2018-05-13 RX ADMIN — Medication 1 APPLICATION(S): at 21:37

## 2018-05-13 RX ADMIN — CARVEDILOL PHOSPHATE 6.25 MILLIGRAM(S): 80 CAPSULE, EXTENDED RELEASE ORAL at 12:08

## 2018-05-13 RX ADMIN — Medication 650 MILLIGRAM(S): at 18:43

## 2018-05-13 RX ADMIN — HUMAN INSULIN 13 UNIT(S): 100 INJECTION, SUSPENSION SUBCUTANEOUS at 05:46

## 2018-05-13 RX ADMIN — Medication 3 MILLILITER(S): at 17:18

## 2018-05-13 RX ADMIN — PIPERACILLIN AND TAZOBACTAM 25 GRAM(S): 4; .5 INJECTION, POWDER, LYOPHILIZED, FOR SOLUTION INTRAVENOUS at 21:37

## 2018-05-13 RX ADMIN — Medication 3 MILLILITER(S): at 01:43

## 2018-05-13 RX ADMIN — Medication 250 MILLIGRAM(S): at 20:21

## 2018-05-13 RX ADMIN — Medication 10 MILLIGRAM(S): at 01:11

## 2018-05-13 RX ADMIN — Medication 1: at 00:52

## 2018-05-13 RX ADMIN — HEPARIN SODIUM 5000 UNIT(S): 5000 INJECTION INTRAVENOUS; SUBCUTANEOUS at 05:46

## 2018-05-13 RX ADMIN — HEPARIN SODIUM 5000 UNIT(S): 5000 INJECTION INTRAVENOUS; SUBCUTANEOUS at 21:37

## 2018-05-13 RX ADMIN — Medication 1: at 05:46

## 2018-05-13 RX ADMIN — HEPARIN SODIUM 5000 UNIT(S): 5000 INJECTION INTRAVENOUS; SUBCUTANEOUS at 13:09

## 2018-05-13 RX ADMIN — HUMAN INSULIN 13 UNIT(S): 100 INJECTION, SUSPENSION SUBCUTANEOUS at 12:59

## 2018-05-13 NOTE — PROGRESS NOTE ADULT - SUBJECTIVE AND OBJECTIVE BOX
CHIEF COMPLAINT: bradycardia, hyperkalemia    Interval Events: Patient seen and examined. No acute events overnight. No events on telemetry.     REVIEW OF SYSTEMS:  Constitutional: [ ] negative [ ] fevers [ ] chills [ ] weight loss [ ] weight gain  HEENT: [ ] negative [ ] dry eyes [ ] eye irritation [ ] postnasal drip [ ] nasal congestion  CV: [ ] negative  [ ] chest pain [ ] orthopnea [ ] palpitations [ ] murmur  Resp: [ ] negative [ ] cough [ ] shortness of breath [ ] dyspnea [ ] wheezing [ ] sputum [ ] hemoptysis  GI: [ ] negative [ ] nausea [ ] vomiting [ ] diarrhea [ ] constipation [ ] abd pain [ ] dysphagia   : [ ] negative [ ] dysuria [ ] nocturia [ ] hematuria [ ] increased urinary frequency  Musculoskeletal: [ ] negative [ ] back pain [ ] myalgias [ ] arthralgias [ ] fracture  Skin: [ ] negative [ ] rash [ ] itch  Neurological: [ ] negative [ ] headache [ ] dizziness [ ] syncope [ ] weakness [ ] numbness  Psychiatric: [ ] negative [ ] anxiety [ ] depression  Endocrine: [ ] negative [ ] diabetes [ ] thyroid problem  Hematologic/Lymphatic: [ ] negative [ ] anemia [ ] bleeding problem  Allergic/Immunologic: [ ] negative [ ] itchy eyes [ ] nasal discharge [ ] hives [ ] angioedema  [ ] All other systems negative  [ ] Unable to assess ROS because ________    OBJECTIVE:  ICU Vital Signs Last 24 Hrs  T(C): 36.9 (13 May 2018 04:00), Max: 37.6 (12 May 2018 12:00)  T(F): 98.4 (13 May 2018 04:00), Max: 99.7 (12 May 2018 12:00)  HR: 102 (13 May 2018 07:00) (77 - 106)  BP: 141/63 (13 May 2018 07:00) (112/53 - 177/74)  BP(mean): 91 (13 May 2018 07:00) (77 - 109)  ABP: --  ABP(mean): --  RR: 20 (13 May 2018 07:00) (12 - 25)  SpO2: 100% (13 May 2018 07:00) (94% - 100%)        05-12 @ 07:01  -  05-13 @ 07:00  --------------------------------------------------------  IN: 585 mL / OUT: 210 mL / NET: 375 mL      CAPILLARY BLOOD GLUCOSE      POCT Blood Glucose.: 193 mg/dL (13 May 2018 05:40)      PHYSICAL EXAM:  General: NAD  HEENT: NC/AT; PERRL, clear conjunctiva  Neck: supple, tracheostomy intact  Respiratory: CTA b/l  Cardiovascular: +S1/S2; RRR  Abdomen: soft, mildly diffusely ttp, PEG tube intact on right side  Extremities: WWP, 2+ peripheral pulses b/l; no LE edema  Skin: normal color and turgor; circumferential violaceous macule on lower back with smaller pus filled dots  Neurological: A&Ox3      LINES:    HOSPITAL MEDICATIONS:  aspirin  chewable 81 milliGRAM(s) Oral daily  heparin  Injectable 5000 Unit(s) SubCutaneous every 8 hours      hydrALAZINE Injectable 10 milliGRAM(s) IV Push every 6 hours PRN    insulin lispro (HumaLOG) corrective regimen sliding scale   SubCutaneous every 6 hours  insulin NPH human recombinant 13 Unit(s) SubCutaneous every 6 hours    ALBUTerol/ipratropium for Nebulization 3 milliLiter(s) Nebulizer every 4 hours        LABS:                        13.1   7.7   )-----------( 244      ( 13 May 2018 00:34 )             40.5     Hgb Trend: 13.1<--, 12.4<--, 11.4<--, 4.2<--  05-13    135  |  98  |  21  ----------------------------<  152<H>  4.4   |  23  |  1.04    Ca    9.7      13 May 2018 00:34  Phos  4.8     05-13  Mg     1.7         TPro  6.2  /  Alb  3.2<L>  /  TBili  0.2  /  DBili  x   /  AST  15  /  ALT  22  /  AlkPhos  184<H>  05-11    Creatinine Trend: 1.04<--, 1.06<--, 1.14<--, 1.26<--, 1.42<--, 1.50<--  PT/INR - ( 11 May 2018 21:56 )   PT: 11.1 sec;   INR: 1.03 ratio         PTT - ( 11 May 2018 21:56 )  PTT:23.9 sec  Urinalysis Basic - ( 12 May 2018 02:46 )    Color: Colorless / Appearance: Clear / S.007 / pH: x  Gluc: x / Ketone: Negative  / Bili: Negative / Urobili: Negative   Blood: x / Protein: Negative / Nitrite: Negative   Leuk Esterase: Negative / RBC: x / WBC x   Sq Epi: x / Non Sq Epi: x / Bacteria: x        Venous Blood Gas:   @ 06:25  7.32/46/41/23/70  VBG Lactate: 1.8  Venous Blood Gas:   @ 02:44  7.30/50/36/24/60  VBG Lactate: 1.8  Venous Blood Gas:   @ 21:07  7.24/47/35/20/51  VBG Lactate: 1.3  Venous Blood Gas:   @ 20:36  7.14/25/44/8/68  VBG Lactate: 1.0      MICROBIOLOGY:   Culture - Blood (18 @ 05:40)    Specimen Source: .Blood Blood-Peripheral    Culture Results:   No growth to date.    Culture - Blood (18 @ 05:40)    Specimen Source: .Blood Blood-Venous    Culture Results:   No growth to date.        RADIOLOGY:  [ x] Reviewed and interpreted by me  < from: Xray Feeding Tube Check SI (18 @ 13:08) >    IMPRESSION:  Status post PEG tube placement.    < end of copied text >      EKG: CHIEF COMPLAINT: bradycardia, hyperkalemia    Interval Events: Patient seen and examined. No acute events overnight. No events on telemetry. Denies chest pain, shortness of breath, abdominal pain or n/v.    REVIEW OF SYSTEMS:  Constitutional: [ ] negative [ ] fevers [ ] chills [ ] weight loss [ ] weight gain  HEENT: [ ] negative [ ] dry eyes [ ] eye irritation [ ] postnasal drip [ ] nasal congestion  CV: [ ] negative  [ ] chest pain [ ] orthopnea [ ] palpitations [ ] murmur  Resp: [ ] negative [ ] cough [ ] shortness of breath [ ] dyspnea [ ] wheezing [ ] sputum [ ] hemoptysis  GI: [ ] negative [ ] nausea [ ] vomiting [ ] diarrhea [ ] constipation [ ] abd pain [ ] dysphagia   : [ ] negative [ ] dysuria [ ] nocturia [ ] hematuria [ ] increased urinary frequency  Musculoskeletal: [ ] negative [ ] back pain [ ] myalgias [ ] arthralgias [ ] fracture  Skin: [ ] negative [ ] rash [ ] itch  Neurological: [ ] negative [ ] headache [ ] dizziness [ ] syncope [ ] weakness [ ] numbness  Psychiatric: [ ] negative [ ] anxiety [ ] depression  Endocrine: [ ] negative [ ] diabetes [ ] thyroid problem  Hematologic/Lymphatic: [ ] negative [ ] anemia [ ] bleeding problem  Allergic/Immunologic: [ ] negative [ ] itchy eyes [ ] nasal discharge [ ] hives [ ] angioedema  [X ] All other systems negative  [ ] Unable to assess ROS because ________    OBJECTIVE:  ICU Vital Signs Last 24 Hrs  T(C): 36.9 (13 May 2018 04:00), Max: 37.6 (12 May 2018 12:00)  T(F): 98.4 (13 May 2018 04:00), Max: 99.7 (12 May 2018 12:00)  HR: 102 (13 May 2018 07:00) (77 - 106)  BP: 141/63 (13 May 2018 07:00) (112/53 - 177/74)  BP(mean): 91 (13 May 2018 07:00) (77 - 109)  ABP: --  ABP(mean): --  RR: 20 (13 May 2018 07:00) (12 - 25)  SpO2: 100% (13 May 2018 07:00) (94% - 100%)        05-12 @ 07:01  -  05-13 @ 07:00  --------------------------------------------------------  IN: 585 mL / OUT: 210 mL / NET: 375 mL      CAPILLARY BLOOD GLUCOSE      POCT Blood Glucose.: 193 mg/dL (13 May 2018 05:40)      PHYSICAL EXAM:  General: NAD  HEENT: NC/AT; PERRL, clear conjunctiva  Neck: supple, tracheostomy intact  Respiratory: CTA b/l  Cardiovascular: +S1/S2; RRR  Abdomen: soft, mildly diffusely ttp, PEG tube intact on right side  Extremities: WWP, 2+ peripheral pulses b/l; no LE edema  Skin: normal color and turgor; circumferential violaceous macule on lower back with smaller pus filled dots  Neurological: A&Ox3      LINES:    HOSPITAL MEDICATIONS:  aspirin  chewable 81 milliGRAM(s) Oral daily  heparin  Injectable 5000 Unit(s) SubCutaneous every 8 hours      hydrALAZINE Injectable 10 milliGRAM(s) IV Push every 6 hours PRN    insulin lispro (HumaLOG) corrective regimen sliding scale   SubCutaneous every 6 hours  insulin NPH human recombinant 13 Unit(s) SubCutaneous every 6 hours    ALBUTerol/ipratropium for Nebulization 3 milliLiter(s) Nebulizer every 4 hours        LABS:                        13.1   7.7   )-----------( 244      ( 13 May 2018 00:34 )             40.5     Hgb Trend: 13.1<--, 12.4<--, 11.4<--, 4.2<--  05-13    135  |  98  |  21  ----------------------------<  152<H>  4.4   |  23  |  1.04    Ca    9.7      13 May 2018 00:34  Phos  4.8       Mg     1.7         TPro  6.2  /  Alb  3.2<L>  /  TBili  0.2  /  DBili  x   /  AST  15  /  ALT  22  /  AlkPhos  184<H>  0511    Creatinine Trend: 1.04<--, 1.06<--, 1.14<--, 1.26<--, 1.42<--, 1.50<--  PT/INR - ( 11 May 2018 21:56 )   PT: 11.1 sec;   INR: 1.03 ratio         PTT - ( 11 May 2018 21:56 )  PTT:23.9 sec  Urinalysis Basic - ( 12 May 2018 02:46 )    Color: Colorless / Appearance: Clear / S.007 / pH: x  Gluc: x / Ketone: Negative  / Bili: Negative / Urobili: Negative   Blood: x / Protein: Negative / Nitrite: Negative   Leuk Esterase: Negative / RBC: x / WBC x   Sq Epi: x / Non Sq Epi: x / Bacteria: x        Venous Blood Gas:   @ 06:25  7.32/46/41/23/70  VBG Lactate: 1.8  Venous Blood Gas:   @ 02:44  7.30/50/36/24/60  VBG Lactate: 1.8  Venous Blood Gas:   @ 21:07  7.24/47/35/20/51  VBG Lactate: 1.3  Venous Blood Gas:   @ 20:36  7.14/25/44/8/68  VBG Lactate: 1.0      MICROBIOLOGY:   Culture - Blood (18 @ 05:40)    Specimen Source: .Blood Blood-Peripheral    Culture Results:   No growth to date.    Culture - Blood (18 @ 05:40)    Specimen Source: .Blood Blood-Venous    Culture Results:   No growth to date.        RADIOLOGY:  [ x] Reviewed and interpreted by me  < from: Xray Feeding Tube Check SI (18 @ 13:08) >    IMPRESSION:  Status post PEG tube placement.    < end of copied text >      EKG:

## 2018-05-13 NOTE — PROGRESS NOTE ADULT - ASSESSMENT
69 y/o F w/ hx of DM2, HTN, breast CA (dx in 2/2017), w recent hospitalization for respiratory arrest 2/2 influenza, complicated by aspiration pna, PEA arrest x2 with ROS, PE s/p tPA, ARF requiring HD, s/p trach and PEG tube presents with bradycardia and EKG concerning for arrythmia 2/2 medication side effect vs hyperkalemia.     #Neuro  - pt is at baseline mental status  - no active issues    #CV  - bradycardic to 30s in ED with EGK concerning for junctional rhythm and peaked T waves. Improved after temporizing measures. Concern for juanita agent toxicity (pt on both beta blockers and CCBs) in setting of CKD and hyperkalemia, elevated blood glucose. Now in NSR after temporizing measures and normalization of electrolytes  - continuous telemetry  - atropine at bedside PRN, external pacing pads on pt  - trend Kika  - BMP Q4H  - hydralazine 10mg IV Q6H PRN for SBP > 160    #Pulm  - s/p tracheostomy for chronic hypoxemic respiratory failure  - continue with trach collar  - duonebs Q6H    #Endo  - VBG on admission showed pH of 7.24, PCO2 of 47, indicating metabolic acidosis. Concern for mild DKA given elevated blood glucose of 362, now 313.  -serum betahydroxy butyrate and acetone negative, not in DKA  -off insulin gtt, transitioned to NPH. Restarted tube feeds    #Renal  - pt has hx of ESRD requiring HD during previous admission, now off HD but w CKD. Serum Cr 1.57  - hyperkalemic to 6.9 in ED, most recent BMP K of 5.2. Will give Kayexalate x1. Continue to trend  - Strict I&Os, cw lasix PRN  - renally dose all meds, avoid nephrotoxins      #FEN/GI  - PEG confirmed in place by Xray  -continue with tube feeds      #ID  -WBC wnl and afebrile on presentation  - blood and urine cx sent, f/u results  -will cont to monitor off abx for now    #PPX  - heparin subQ 67 y/o F w/ hx of DM2, HTN, breast CA (dx in 2/2017), w recent hospitalization for respiratory arrest 2/2 influenza, complicated by aspiration pna, PEA arrest x2 with ROS, PE s/p tPA, ARF requiring HD, s/p trach and PEG tube presents with bradycardia and EKG concerning for arrythmia 2/2 medication side effect vs hyperkalemia.     #Neuro  - pt is at baseline mental status  - no active issues    #CV  - bradycardic to 30s in ED with EGK concerning for junctional rhythm and peaked T waves. Now resolved. Concern for juanita agent toxicity (pt on both beta blockers and CCBs) in setting of CKD and hyperkalemia, elevated blood glucose.  - continuous telemetry  - atropine at bedside PRN, external pacing pads on pt  - hydralazine 10mg IV Q6H PRN for SBP > 160. Consider switching to po beta blocker    #Pulm  - s/p tracheostomy for chronic hypoxemic respiratory failure  - continue with trach collar  - duonebs Q6H    #Endo  -started on nph 13 units q6 hr. FS better controlled, continue to monitor   -serum betahydroxy butyrate and acetone negative, not in DKA      #Renal  - pt has hx of ESRD requiring HD during previous admission, now off HD but w CKD. Serum Cr 1.57  - hyperkalemic to 6.9 in ED, most recent BMP K of 4.4, Cr trending towards baseline  - Strict I&Os, cw lasix PRN  - renally dose all meds, avoid nephrotoxins      #FEN/GI  - PEG confirmed in place by Xray  -continue with tube feeds      #ID  -WBC wnl and afebrile on presentation  - blood cx NGTD  -will cont to monitor off abx for now    #PPX  - heparin subQ

## 2018-05-13 NOTE — CONSULT NOTE ADULT - SUBJECTIVE AND OBJECTIVE BOX
CARDIOLOGY CONSULT NOTE  PROVIDER: Chino Estrada MD  DATE: 5/13/18  REASON: bradycardia, junctional rhythm    CHIEF COMPLAINT/REASON FOR CONSULT: bradycardia    HPI:  69 y/o F w/ hx of DM2, HTN, breast CA (dx in 2/2017), with recent hospitalization for respiratory arrest 2/2 influenza, complicated by aspiration PNA, PEA arrest x2, PE s/p tPA, ARF requiring temporary HD, s/p trach and PEG tube, who presents with bradycardia and shortness of breath. Pt was brought in from rehab after found to be bradycardic w/ HR in 30s. Pt had c/o worsening shortness of breath. She was noted on first ECG in the ER to have a sinus arrest with a junctional escape rhythm @ 34 bpm. She was found to be hyperkalemic, and was treated for that. Within 2 hours, her sinus node regained function. Additionally, family states that she was recently started on cardizem, in addition to carvedilol. Pt now states that she is feeling better. Denies CP, SOB, palpitations, dizziness, diaphoresis.    PAST MEDICAL & SURGICAL HISTORY:  Cardiac arrest  Stroke  Diabetes  Breast CA  H/O mastectomy, bilateral        MEDICATIONS:  MEDICATIONS  (STANDING):  ALBUTerol/ipratropium for Nebulization 3 milliLiter(s) Nebulizer every 4 hours  aspirin  chewable 81 milliGRAM(s) Oral daily  heparin  Injectable 5000 Unit(s) SubCutaneous every 8 hours  insulin lispro (HumaLOG) corrective regimen sliding scale   SubCutaneous every 6 hours  insulin NPH human recombinant 13 Unit(s) SubCutaneous every 6 hours    MEDICATIONS  (PRN):  hydrALAZINE Injectable 10 milliGRAM(s) IV Push every 6 hours PRN SBP > 160      FAMILY HISTORY:  No pertinent family history in first degree relatives      SOCIAL HISTORY: no tobacco, alcohol or drugs      Allergies  doxycycline (Rash)  isoniazid (Rash)  NIFEdipine (Urticaria; Hives)  vitamin E (Short breath; Urticaria; Hives)  	      REVIEW OF SYSTEMS:  CONSTITUTIONAL: No fever, weight loss, or fatigue  EYES: No eye pain, visual disturbances, or discharge  ENMT:  No difficulty hearing, tinnitus, vertigo; No sinus or throat pain  NECK: No pain or stiffness  RESPIRATORY: No cough, wheezing, chills or hemoptysis;  GASTROINTESTINAL: No abdominal or epigastric pain. No nausea, vomiting, or hematemesis; No diarrhea or constipation. No melena or hematochezia.  GENITOURINARY: No dysuria, frequency, hematuria, or incontinence  NEUROLOGICAL: No headaches, memory loss, loss of strength, numbness, or tremors  SKIN: No itching, burning, rashes, or lesions   	    PHYSICAL EXAM:  Vital Signs Last 24 Hrs  T(C): 36.9 (13 May 2018 04:00), Max: 37.6 (12 May 2018 12:00)  T(F): 98.4 (13 May 2018 04:00), Max: 99.7 (12 May 2018 12:00)  HR: 105 (13 May 2018 08:00) (80 - 106)  BP: 133/62 (13 May 2018 08:00) (112/53 - 177/74)  BP(mean): 89 (13 May 2018 08:00) (77 - 109)  RR: 19 (13 May 2018 08:00) (12 - 24)  SpO2: 100% (13 May 2018 08:00) (94% - 100%)  I&O's Summary    12 May 2018 07:01  -  13 May 2018 07:00  --------------------------------------------------------  IN: 585 mL / OUT: 210 mL / NET: 375 mL        Telemetry:  sinus 50's --> sinus 80's-100's    General: NAD, alert and responsive	  HEENT:   No JVD, No bruit, + trach	  Cardiovascular: RRR, Normal S1 and S2, No murmurs/gallops/rubs  Respiratory: Lungs clear to auscultation	  Gastrointestinal:  Soft, Non-tender, + BS	  Skin: No rashes, No ecchymoses, No cyanosis	  Extremities: No clubbing, cyanosis or edema  Vascular: normal peripheral pulses palpable bilaterally    	  LABS:	 	                          13.1   7.7   )-----------( 244      ( 13 May 2018 00:34 )             40.5     05-13    135  |  98  |  21  ----------------------------<  152<H>  4.4   |  23  |  1.04    Ca    9.7      13 May 2018 00:34  Phos  4.8     05-13  Mg     1.7     05-13    TPro  6.2  /  Alb  3.2<L>  /  TBili  0.2  /  DBili  x   /  AST  15  /  ALT  22  /  AlkPhos  184<H>  05-11    PT/INR - ( 11 May 2018 21:56 )   PT: 11.1 sec;   INR: 1.03 ratio         PTT - ( 11 May 2018 21:56 )  PTT:23.9 sec      ASSESSMENT/PLAN: 	  69 y/o F w/ hx of DM2, HTN, breast CA (dx in 2/2017), with recent hospitalization for respiratory arrest 2/2 influenza, complicated by aspiration PNA, PEA arrest x2, PE s/p tPA, ARF requiring temporary HD, s/p trach and PEG tube, who presents with bradycardia  1. Bradycardia - pt with a sinus arrest with a junctional escape rhythm, in the setting of hyperkalemia and two AV juanita blockers. I suspect that they hyperkalemia played a larger role than the combination of carvedilol and cardizem, especially since she does not have other evidence of significant conduction disease on her ECG. Unclear etiology of hyperkalemia, although she did present with an RUSS as well, with elevated creatinine, which has already resolved (although the increased creatinine could also have been due to poor renal perfusion in the setting of significant bradycardia. As soon as hyperkalemia was treated, her sinus node kicked-in. HR is now in the 80's-100's, and BP is increasing. She was taking carvedilol 25mg in am and 12.5mg in pm, per the daughter. Would suggest to resume carvedilol at 6.25mg bid today and will continue to follow on telemetry.  2. HTN - will resume lower dose carvedilol and will follow.    ** I spent more than 60 minutes reviewing the patient's chart (notes, labs, radiology, vitals, meds, and telemetry), assessing the patient, and discussing the plan of care with the patient, her  (at the bedside), her daughter Leola (by phone), and with the patient's nurse and the ICU team.

## 2018-05-13 NOTE — PROCEDURE NOTE - NSPROCDETAILS_GEN_ALL_CORE
incision left open, packing placed incision left open, packing placed/probing to break up loculations

## 2018-05-13 NOTE — PROGRESS NOTE ADULT - ATTENDING COMMENTS
68 F hx breast ca (stage 3 previously on herceptin), PEA arrest, ARDS, PE s/p tpa, CKD and trach admitted with bradycardia to 30s with junctional rhythm but maintained adequate BP. Was on both CCB and BB. Also noted to be hyperkalemic and hyperglycemic. Received atropine, ca and insulin gtt for bradycardia which promptly resolved. No episode of bradyarrhythmia. Resp status stable an w/ trach w/ speaking valve. Start oral htn. Remains off insulin gtt. Cont basal long acting insulin. Needs full swallow eval as failed bedside swallow.

## 2018-05-14 LAB
ALBUMIN SERPL ELPH-MCNC: 2.6 G/DL — LOW (ref 3.3–5)
ALP SERPL-CCNC: 207 U/L — HIGH (ref 40–120)
ALT FLD-CCNC: 27 U/L — SIGNIFICANT CHANGE UP (ref 10–45)
ANION GAP SERPL CALC-SCNC: 13 MMOL/L — SIGNIFICANT CHANGE UP (ref 5–17)
ANION GAP SERPL CALC-SCNC: 13 MMOL/L — SIGNIFICANT CHANGE UP (ref 5–17)
AST SERPL-CCNC: 33 U/L — SIGNIFICANT CHANGE UP (ref 10–40)
BILIRUB SERPL-MCNC: 0.2 MG/DL — SIGNIFICANT CHANGE UP (ref 0.2–1.2)
BUN SERPL-MCNC: 18 MG/DL — SIGNIFICANT CHANGE UP (ref 7–23)
BUN SERPL-MCNC: 20 MG/DL — SIGNIFICANT CHANGE UP (ref 7–23)
CALCIUM SERPL-MCNC: 10.6 MG/DL — HIGH (ref 8.4–10.5)
CALCIUM SERPL-MCNC: 7.9 MG/DL — LOW (ref 8.4–10.5)
CHLORIDE SERPL-SCNC: 73 MMOL/L — LOW (ref 96–108)
CHLORIDE SERPL-SCNC: 97 MMOL/L — SIGNIFICANT CHANGE UP (ref 96–108)
CK MB CFR SERPL CALC: 1.5 NG/ML — SIGNIFICANT CHANGE UP (ref 0–3.8)
CK SERPL-CCNC: <10 U/L — LOW (ref 25–170)
CO2 SERPL-SCNC: 18 MMOL/L — LOW (ref 22–31)
CO2 SERPL-SCNC: 25 MMOL/L — SIGNIFICANT CHANGE UP (ref 22–31)
CREAT SERPL-MCNC: 0.83 MG/DL — SIGNIFICANT CHANGE UP (ref 0.5–1.3)
CREAT SERPL-MCNC: 1.03 MG/DL — SIGNIFICANT CHANGE UP (ref 0.5–1.3)
GLUCOSE BLDC GLUCOMTR-MCNC: 110 MG/DL — HIGH (ref 70–99)
GLUCOSE BLDC GLUCOMTR-MCNC: 122 MG/DL — HIGH (ref 70–99)
GLUCOSE BLDC GLUCOMTR-MCNC: 124 MG/DL — HIGH (ref 70–99)
GLUCOSE BLDC GLUCOMTR-MCNC: 164 MG/DL — HIGH (ref 70–99)
GLUCOSE SERPL-MCNC: 1009 MG/DL — CRITICAL HIGH (ref 70–99)
GLUCOSE SERPL-MCNC: 159 MG/DL — HIGH (ref 70–99)
HCT VFR BLD CALC: 35.9 % — SIGNIFICANT CHANGE UP (ref 34.5–45)
HGB BLD-MCNC: 10.9 G/DL — LOW (ref 11.5–15.5)
MAGNESIUM SERPL-MCNC: >8 MG/DL — CRITICAL HIGH (ref 1.6–2.6)
MCHC RBC-ENTMCNC: 27 PG — SIGNIFICANT CHANGE UP (ref 27–34)
MCHC RBC-ENTMCNC: 30.4 GM/DL — LOW (ref 32–36)
MCV RBC AUTO: 88.9 FL — SIGNIFICANT CHANGE UP (ref 80–100)
PHOSPHATE SERPL-MCNC: 3.9 MG/DL — SIGNIFICANT CHANGE UP (ref 2.5–4.5)
PLATELET # BLD AUTO: SIGNIFICANT CHANGE UP K/UL (ref 150–400)
POTASSIUM SERPL-MCNC: 3.7 MMOL/L — SIGNIFICANT CHANGE UP (ref 3.5–5.3)
POTASSIUM SERPL-MCNC: 4.8 MMOL/L — SIGNIFICANT CHANGE UP (ref 3.5–5.3)
POTASSIUM SERPL-SCNC: 3.7 MMOL/L — SIGNIFICANT CHANGE UP (ref 3.5–5.3)
POTASSIUM SERPL-SCNC: 4.8 MMOL/L — SIGNIFICANT CHANGE UP (ref 3.5–5.3)
PROT SERPL-MCNC: 5.7 G/DL — LOW (ref 6–8.3)
RBC # BLD: 4.04 M/UL — SIGNIFICANT CHANGE UP (ref 3.8–5.2)
RBC # FLD: 15.8 % — HIGH (ref 10.3–14.5)
SODIUM SERPL-SCNC: 105 MMOL/L — CRITICAL LOW (ref 135–145)
SODIUM SERPL-SCNC: 135 MMOL/L — SIGNIFICANT CHANGE UP (ref 135–145)
TROPONIN T SERPL-MCNC: 0.01 NG/ML — SIGNIFICANT CHANGE UP (ref 0–0.06)
TROPONIN T SERPL-MCNC: 0.02 NG/ML — SIGNIFICANT CHANGE UP (ref 0–0.06)
WBC # BLD: 7.57 K/UL — SIGNIFICANT CHANGE UP (ref 3.8–10.5)
WBC # FLD AUTO: 7.57 K/UL — SIGNIFICANT CHANGE UP (ref 3.8–10.5)

## 2018-05-14 PROCEDURE — 71045 X-RAY EXAM CHEST 1 VIEW: CPT | Mod: 26

## 2018-05-14 PROCEDURE — 93010 ELECTROCARDIOGRAM REPORT: CPT

## 2018-05-14 RX ORDER — IPRATROPIUM/ALBUTEROL SULFATE 18-103MCG
3 AEROSOL WITH ADAPTER (GRAM) INHALATION ONCE
Qty: 0 | Refills: 0 | Status: COMPLETED | OUTPATIENT
Start: 2018-05-14 | End: 2018-05-14

## 2018-05-14 RX ORDER — MAGNESIUM SULFATE 500 MG/ML
1 VIAL (ML) INJECTION ONCE
Qty: 0 | Refills: 0 | Status: COMPLETED | OUTPATIENT
Start: 2018-05-14 | End: 2018-05-14

## 2018-05-14 RX ORDER — CARVEDILOL PHOSPHATE 80 MG/1
12.5 CAPSULE, EXTENDED RELEASE ORAL EVERY 12 HOURS
Qty: 0 | Refills: 0 | Status: DISCONTINUED | OUTPATIENT
Start: 2018-05-14 | End: 2018-05-22

## 2018-05-14 RX ADMIN — PIPERACILLIN AND TAZOBACTAM 25 GRAM(S): 4; .5 INJECTION, POWDER, LYOPHILIZED, FOR SOLUTION INTRAVENOUS at 15:29

## 2018-05-14 RX ADMIN — Medication 1: at 06:01

## 2018-05-14 RX ADMIN — Medication 3 MILLILITER(S): at 02:34

## 2018-05-14 RX ADMIN — Medication 100 GRAM(S): at 03:46

## 2018-05-14 RX ADMIN — HEPARIN SODIUM 5000 UNIT(S): 5000 INJECTION INTRAVENOUS; SUBCUTANEOUS at 06:01

## 2018-05-14 RX ADMIN — HEPARIN SODIUM 5000 UNIT(S): 5000 INJECTION INTRAVENOUS; SUBCUTANEOUS at 15:29

## 2018-05-14 RX ADMIN — Medication 1 APPLICATION(S): at 09:45

## 2018-05-14 RX ADMIN — Medication 1 APPLICATION(S): at 11:32

## 2018-05-14 RX ADMIN — HUMAN INSULIN 13 UNIT(S): 100 INJECTION, SUSPENSION SUBCUTANEOUS at 06:01

## 2018-05-14 RX ADMIN — Medication 3 MILLILITER(S): at 09:47

## 2018-05-14 RX ADMIN — CARVEDILOL PHOSPHATE 12.5 MILLIGRAM(S): 80 CAPSULE, EXTENDED RELEASE ORAL at 18:04

## 2018-05-14 RX ADMIN — Medication 3 MILLILITER(S): at 15:29

## 2018-05-14 RX ADMIN — PIPERACILLIN AND TAZOBACTAM 25 GRAM(S): 4; .5 INJECTION, POWDER, LYOPHILIZED, FOR SOLUTION INTRAVENOUS at 06:03

## 2018-05-14 RX ADMIN — Medication 3 MILLILITER(S): at 03:46

## 2018-05-14 RX ADMIN — HUMAN INSULIN 13 UNIT(S): 100 INJECTION, SUSPENSION SUBCUTANEOUS at 12:40

## 2018-05-14 RX ADMIN — Medication 81 MILLIGRAM(S): at 11:33

## 2018-05-14 RX ADMIN — Medication 3 MILLILITER(S): at 06:01

## 2018-05-14 RX ADMIN — Medication 3 MILLILITER(S): at 18:01

## 2018-05-14 RX ADMIN — HUMAN INSULIN 13 UNIT(S): 100 INJECTION, SUSPENSION SUBCUTANEOUS at 18:03

## 2018-05-14 NOTE — PHYSICAL THERAPY INITIAL EVALUATION ADULT - PLANNED THERAPY INTERVENTIONS, PT EVAL
local wound care/gait training/bed mobility training/balance training/transfer training/strengthening

## 2018-05-14 NOTE — PROVIDER CONTACT NOTE (OTHER) - ASSESSMENT
Pt in bed with c/o heaviness in chest. VSS, see flowsheet. Pt with c/o SOB. PA to assess pt at bedside.

## 2018-05-14 NOTE — SWALLOW BEDSIDE ASSESSMENT ADULT - SWALLOW EVAL: DIAGNOSIS
Pt with known h/o dysphagia. Attempted to contact SLP at previous treating facility [facility] ([phone]). Awaiting call back. Will defer swallow assessment until information re: previous swallow history obtained. Pt with known h/o dysphagia. Attempted to contact SLP at previous treating facility [facility] ([phone]). Awaiting call back. Will defer swallow assessment until information re: previous swallow history obtained. Discussed with pt, pt's spouse and NP Padma.

## 2018-05-14 NOTE — PHYSICAL THERAPY INITIAL EVALUATION ADULT - BALANCE TRAINING, PT EVAL
GOAL: Patient will demonstrate an increase in static/dynamic balance in sitting/standing where deficient by at least 1 grade within 4 weeks to facilitate greater independence during functional mobility and ADL's.

## 2018-05-14 NOTE — SWALLOW BEDSIDE ASSESSMENT ADULT - SLP PERTINENT HISTORY OF CURRENT PROBLEM
67 y/o F w/ hx of DM2, HTN, breast CA (dx in 2/2017), w recent hospitalization for respiratory arrest 2/2 influenza, complicated by aspiration pna, PEA arrest x2 with ROS, PE s/p tPA, ARF requiring HD, s/p trach and PEG tube presents with bradycardia and shortness of breath. Pt was brought in from rehab after found to be bradycardic w/ HR in 30s. Hx obtain via pt and family at bedside. Pt c/o worsening shortness of breath and chest pain today. She has been taking carvedilol, but extended release diltiazem was recently added to her medication regimen after the daughter asked for pt to be taken off clonidine for hypertension.  In ED, pts vs were T100.8 /51 RR16 SaO2 97%. Labs were significant for hyperkalemia to 6.9 and blood glucose of 362. EKG showed bradycardia and junctional rhythm and peaked T waves. EKG concerning for arrythmia 2/2 medication side effect vs hyperkalemia.

## 2018-05-14 NOTE — PHYSICAL THERAPY INITIAL EVALUATION ADULT - PERTINENT HX OF CURRENT PROBLEM, REHAB EVAL
69 y/o F w/ hx of DM2, HTN, breast CA, mastectomies, recent adm for respiratory arrest 2/2 influenza, complicated by aspiration pna, PEA arrest x2, PE s/p tPA, ARF requiring HD, s/p trach and PEG tube presents from rehab with bradycardia and shortness of breath.  Hosp Course: +hyperkalemia and hyperglycemia; EKG +junctional rhythm; given atropine; sent to MICU for monitoring;

## 2018-05-14 NOTE — SWALLOW BEDSIDE ASSESSMENT ADULT - SWALLOW EVAL: PATIENT/FAMILY GOALS STATEMENT
Spouse reports pt had a "barium" swallow test and has been on PO diet of "thin and soft" for four weeks. Reports pt has been using Pass[place] Speaking Valve and tolerating without difficulty with SpO2 remaining at 98% and above.

## 2018-05-14 NOTE — CHART NOTE - NSCHARTNOTEFT_GEN_A_CORE
Notified by RN for pt c/o chest pressure this evening. Pt seen and examined with pts  at bedside. Pt states the pressure started approximately 1 hour ago while she was lying in the hospital bed. Pt denies any provoking / alleviating factors. Pt states the it feels "like a pressure" but denies having any pain. Pt and  endorse + cough from admission with mucus production, that is now resolving but still present. Denies HA, belly pain, fever, chills, SOB, HA, change in vision, nausea, vomiting, and diarrhea.    ICU Vital Signs Last 24 Hrs  T(C): 36.6 (14 May 2018 03:00), Max: 37.2 (13 May 2018 08:00)  T(F): 97.9 (14 May 2018 03:00), Max: 98.9 (13 May 2018 08:00)  HR: 98 (14 May 2018 03:00) (77 - 108)  BP: 151/84 (14 May 2018 03:00) (119/56 - 190/78)  BP(mean): 90 (13 May 2018 22:00) (80 - 116)  RR: 18 (14 May 2018 03:00) (16 - 23)  SpO2: 97% (14 May 2018 03:00) (96% - 100%)      Telemetry: Sinus 90's, No events recorded      PE:    General: +cough NAD, resting comfortably lying supine in bed  Neuro: Nonfocal. A&Ox3  HEENT: NCAT. PERRL  Respiratory: CTA b/l, no wheeze / rhonchi / rales appreciated Notified by RN for pt c/o chest pressure this evening. Pt seen and examined with pts  at bedside. Pt states the pressure started approximately 1 hour ago while she was lying in the hospital bed. Pt denies any provoking / alleviating factors. Pt states the it feels "like a pressure" but denies having any pain. Pt and  endorse + cough from admission with mucus production, that is now resolving but still present. Denies HA, belly pain, fever, chills, SOB, HA, change in vision, nausea, vomiting, and diarrhea.    ICU Vital Signs Last 24 Hrs  T(C): 36.6 (14 May 2018 03:00), Max: 37.2 (13 May 2018 08:00)  T(F): 97.9 (14 May 2018 03:00), Max: 98.9 (13 May 2018 08:00)  HR: 98 (14 May 2018 03:00) (77 - 108)  BP: 151/84 (14 May 2018 03:00) (119/56 - 190/78)  BP(mean): 90 (13 May 2018 22:00) (80 - 116)  RR: 18 (14 May 2018 03:00) (16 - 23)  SpO2: 97% (14 May 2018 03:00) (96% - 100%)      Telemetry: Sinus 90's, No events recorded      PE:    General: NAD, resting comfortably lying supine in bed  Neuro: Nonfocal. A&Ox3  Skin: I & D site on  to palpation, Dressing clean and intact.  HEENT: NCAT. PERRL  Respiratory: +Cough CTA b/l, no wheeze / rhonchi / rales appreciated  Cardiac: S1, S2. No murmurs appreciated  GI: Soft, nontender, nondistended          A / P       69 y/o F w/ hx of DM2, HTN, breast CA (dx in 2/2017), w recent hospitalization for respiratory arrest 2/2 influenza, complicated by aspiration pna, PEA arrest x2 with ROS, PE s/p tPA, ARF requiring HD, s/p trach and PEG tube presents with bradycardia and EKG concerning for arrythmia 2/2 medication side effect vs hyperkalemia. Pt transferred to 4M overnight, bradycardia and hyperkalemia have since resolved. Pt with episode of "chest pressure this evening" while lying in bed. Pt with + cough / mucus production. Recently started on vanco / zosyn for abscess on pts back.     1) Chest pressure  - STAT EKG Notified by RN for pt c/o chest pressure this evening. Pt seen and examined with pts  at bedside. Pt states the pressure started approximately 1 hour ago while she was lying in the hospital bed. Pt denies any provoking / alleviating factors. Pt states the it feels "like a pressure" but denies having any pain. Pt and  endorse + cough from admission with mucus production, that is now resolving but still present. Denies HA, belly pain, fever, chills, SOB, HA, change in vision, nausea, vomiting, and diarrhea.    ICU Vital Signs Last 24 Hrs  T(C): 36.6 (14 May 2018 03:00), Max: 37.2 (13 May 2018 08:00)  T(F): 97.9 (14 May 2018 03:00), Max: 98.9 (13 May 2018 08:00)  HR: 98 (14 May 2018 03:00) (77 - 108)  BP: 151/84 (14 May 2018 03:00) (119/56 - 190/78)  BP(mean): 90 (13 May 2018 22:00) (80 - 116)  RR: 18 (14 May 2018 03:00) (16 - 23)  SpO2: 97% (14 May 2018 03:00) (96% - 100%)      Telemetry: Sinus 90's, No events recorded      PE:    General: NAD, resting comfortably lying supine in bed  Neuro: Nonfocal. A&Ox3  Skin: I & D site on  to palpation, Dressing clean and intact.  HEENT: NCAT. PERRL  Respiratory: +Cough CTA b/l, no wheeze / rhonchi / rales appreciated  Cardiac: S1, S2. No murmurs appreciated  GI: Soft, nontender, nondistended          A / P       67 y/o F w/ hx of DM2, HTN, breast CA (dx in 2/2017), w recent hospitalization for respiratory arrest 2/2 influenza, complicated by aspiration pna, PEA arrest x2 with ROS, PE s/p tPA, ARF requiring HD, s/p trach and PEG tube presents with bradycardia and EKG concerning for arrythmia 2/2 medication side effect vs hyperkalemia. Pt transferred to 4M overnight, bradycardia and hyperkalemia have since resolved. Pt with episode of "chest pressure this evening" while lying in bed. Pt with + cough / mucus production. Recently started on vanco / zosyn for abscess on pts back.     1) Chest pressure. Pulmonary origin Vs cardiac   - EKG - Normal sinus rhythm, No acute changes from 12/18  - Duoneb x 1 ordered  - Pt trach suctioned  - STAT CE. No trops  - STAT BMP - awaiting repeat results  - Mg 1.7 - Mgsulf 1g IV ordered  - CXR - No acute findings on preliminary read. Please f/u final read in AM  - VSS  - Pt with improvement in sx s/p treatment. Restig comfortably in hospital bed.  - c/w nebs q6 and suctioning PRN while on the unit  - wound care consult for back wound ( I & D  site ) in AM  - f/u with Cards recs in AM  - Will discuss with primary team in AM.     Yossi Brown PA-C  Department of Medicine  [id number] Notified by RN for pt c/o chest pressure this evening. Pt seen and examined with pts  at bedside. Pt states the pressure started approximately 1 hour ago while she was lying in the hospital bed. Pt denies any provoking / alleviating factors. Pt states the it feels "like a pressure" but denies having any pain. Pt and  endorse + cough from admission with mucus production, that is now resolving but still present. Denies HA, belly pain, fever, chills, SOB, HA, change in vision, nausea, vomiting, and diarrhea.    ICU Vital Signs Last 24 Hrs  T(C): 36.6 (14 May 2018 03:00), Max: 37.2 (13 May 2018 08:00)  T(F): 97.9 (14 May 2018 03:00), Max: 98.9 (13 May 2018 08:00)  HR: 98 (14 May 2018 03:00) (77 - 108)  BP: 151/84 (14 May 2018 03:00) (119/56 - 190/78)  BP(mean): 90 (13 May 2018 22:00) (80 - 116)  RR: 18 (14 May 2018 03:00) (16 - 23)  SpO2: 97% (14 May 2018 03:00) (96% - 100%)      Telemetry: Sinus 90's, No events recorded      PE:    General: NAD, resting comfortably lying supine in bed  Neuro: Nonfocal. A&Ox3  Skin: I & D site on  to palpation, Dressing clean and intact.  HEENT: NCAT. PERRL  Respiratory: +Cough CTA b/l, no wheeze / rhonchi / rales appreciated  Cardiac: S1, S2. No murmurs appreciated  GI: Soft, nontender, nondistended          A / P       69 y/o F w/ hx of DM2, HTN, breast CA (dx in 2/2017), w recent hospitalization for respiratory arrest 2/2 influenza, complicated by aspiration pna, PEA arrest x2 with ROS, PE s/p tPA, ARF requiring HD, s/p trach and PEG tube presents with bradycardia and EKG concerning for arrythmia 2/2 medication side effect vs hyperkalemia. Pt transferred to 4M overnight, bradycardia and hyperkalemia have since resolved. Pt with episode of "chest pressure this evening" while lying in bed. Pt with + cough / mucus production. Recently started on vanco / zosyn for abscess on pts back.     1) Chest pressure. Pulmonary origin Vs cardiac   - EKG - Normal sinus rhythm, No acute changes from 5/12/18  - Duoneb x 1 ordered  - Pt trach suctioned  - STAT CE. No trops  - STAT BMP - awaiting repeat results  - Mg 1.7 - Mgsulf 1g IV ordered  - CXR - No acute findings on preliminary read. Please f/u final read in AM  - VSS  - Pt with improvement in sx s/p treatment. Resting comfortably in hospital bed.  - c/w nebs q6 and suctioning PRN while on the unit  - wound care consult for back wound ( I & D  site ) in AM  - f/u with Cards recs in AM  - Will discuss with primary team in AM.     Yossi Brown PA-C  Department of Medicine  [id number]

## 2018-05-14 NOTE — PHYSICAL THERAPY INITIAL EVALUATION ADULT - ADDITIONAL COMMENTS
PTA pt lived in 2nd floor apt with  and dtr, was able to negotiate flight with B HR and some assist, req assist with ADLs cooking/cleaning otherwise independent, has raised toilet seat and grab bars in bathroom. Was ambulating 50 feet at rehab with DF assist using RW

## 2018-05-14 NOTE — PROVIDER CONTACT NOTE (CRITICAL VALUE NOTIFICATION) - SITUATION
Pt with sodium 105, glucose 1009 and mag>8. PA aware. Pt with mag infusing at time of blood draw. Pt with right arm precautions.

## 2018-05-14 NOTE — SWALLOW BEDSIDE ASSESSMENT ADULT - COMMENTS
Hx cont: Her HR improved after temporizing measures and she was admitted to the MICU for bradycardia in the setting of hyperkalemia. Cards suspects that the hyperkalemia played a larger role than the combination of carvedilol and cardizem. Pt with oropharyngeal dysphagia now s/p PEG but with improved bulbar function and now on dysphagia diet as per daughter. Per family at bedside, PEG has not been used for weeks and was scheduled to be removed. Will need assessment to determine if it's still usable. Xray confirmed PEG tube placement, feed were restarted. No signs of infection. Respiratory status stable and w/ trach w/ speaking valve. No pressors needed. Blood glucose noted to be persistently elevated therefore pt was started on an insulin gtt and transitioned to NPH. Pt noted with erythematous papule with firm consistency, small white dots (pus?)->vanco x 1 given, will treat with topical bacitracin. Per MICU, follow up with speech and swallow evaluation possible discontinuation of PEG.     SWALLOW HX:   Pt well known to this service. s/p multiple bedside swallow evaluations. At times with mentation not supporting PO intake. Pt also seen for Passy [place] Speaking Valve evaluation on 3/22/18. Pt with good tolerance for [place] Speaking Valve while maintained on 30% TC, with #6 Portex cuffless trach in place. Rx included placement of speaking valve as tolerated. Vocal quality dysphonic  hoarse, mildly weak, with reduced range, ENT consulted recommended. On 3/27/18 FEES was ordered. Given persistent cognitive deficits, global weakness, persistent dysphonia, and hx of dysphagia with multiple aspiration PNA-> respiratory failure it was recommended to defer FEES exam until after a course of rehabilitation with associated improvement in strength, cognition, and voice. FEES was not performed.

## 2018-05-14 NOTE — PHYSICAL THERAPY INITIAL EVALUATION ADULT - TRANSFER TRAINING, PT EVAL
GOAL: Patient will perform sit to stand transfers independently at rolling walker in 4 weeks with proper hand placement

## 2018-05-14 NOTE — PROGRESS NOTE ADULT - SUBJECTIVE AND OBJECTIVE BOX
CARDIOLOGY FOLLOW UP NOTE - DR. DAMION ROSENBERG    Patient states no complaints at this time. She states that she has SSCP, pressure-like, mild, non-radiating lasting about 5 minutes. It occurred last night while laying in bed. She has been having a cough, which is resolving. CP is different from other episodes of CP, which were due to CAD/ischemia.    MEDICATIONS  (STANDING):  ALBUTerol/ipratropium for Nebulization 3 milliLiter(s) Nebulizer every 4 hours  aspirin  chewable 81 milliGRAM(s) Oral daily  BACItracin   Ointment 1 Application(s) Topical daily  carvedilol 12.5 milliGRAM(s) Oral every 12 hours  clotrimazole 1% Cream 1 Application(s) Topical two times a day  heparin  Injectable 5000 Unit(s) SubCutaneous every 8 hours  insulin lispro (HumaLOG) corrective regimen sliding scale   SubCutaneous every 6 hours  insulin NPH human recombinant 13 Unit(s) SubCutaneous every 6 hours  piperacillin/tazobactam IVPB. 3.375 Gram(s) IV Intermittent every 8 hours        PHYSICAL EXAM:  Vital Signs Last 24 Hrs  T(C): 36.6 (14 May 2018 03:00), Max: 37.2 (13 May 2018 16:00)  T(F): 97.9 (14 May 2018 03:00), Max: 98.9 (13 May 2018 16:00)  HR: 99 (14 May 2018 04:35) (77 - 108)  BP: 151/84 (14 May 2018 03:00) (125/58 - 190/78)  BP(mean): 90 (13 May 2018 22:00) (83 - 116)  RR: 18 (14 May 2018 04:35) (16 - 23)  SpO2: 97% (14 May 2018 04:35) (96% - 100%)  I&O's Summary    13 May 2018 07:01  -  14 May 2018 07:00  --------------------------------------------------------  IN: 1230 mL / OUT: 0 mL / NET: 1230 mL        Telemetry:  sinus 70's-110's    General: NAD	  HEENT:   No JVD, + trach	  Cardiovascular: RRR, Normal S1 and S2, No murmurs/gallops/rubs  Respiratory: Lungs clear to auscultation	  Gastrointestinal:  Soft, Non-tender, + BS	  Extremities: No edema    	    LABS:                          10.9   7.57  )-----------( Clumped    ( 14 May 2018 05:15 )             35.9     05-14    135  |  97  |  20  ----------------------------<  159<H>  4.8   |  25  |  1.03    Ca    10.6<H>      14 May 2018 06:50  Phos  3.9     05-14  Mg     >8.0     05-14    TPro  5.7<L>  /  Alb  2.6<L>  /  TBili  0.2  /  DBili  x   /  AST  33  /  ALT  27  /  AlkPhos  207<H>  05-14          Assessment and Plan:  69 y/o F w/ hx of DM2, HTN, breast CA (dx in 2/2017), with recent hospitalization for respiratory arrest 2/2 influenza, complicated by aspiration PNA, PEA arrest x2, PE s/p tPA, ARF requiring temporary HD, s/p trach and PEG tube, who presents with bradycardia  1. Bradycardia - pt with a sinus arrest with a junctional escape rhythm, in the setting of hyperkalemia and two AV juanita blockers. I suspect that the hyperkalemia played a larger role than the combination of carvedilol and cardizem, especially since she does not have other evidence of significant conduction disease on her ECG. Unclear etiology of hyperkalemia, although she did present with an RUSS as well, with elevated creatinine, which has already resolved (although the increased creatinine could also have been due to poor renal perfusion in the setting of significant bradycardia). As soon as hyperkalemia was treated, her sinus node kicked-in. HR is now in the 70's-110's, and BP is increasing. She was taking carvedilol 25mg in am and 12.5mg in pm, per the daughter. Increase carvedilol at 12.5mg bid today and will continue to follow on telemetry.  2. HTN - will increase carvedilol to 12.5mg bid. Pt was on Ramipril in the past as well. Unclear etiology of hyperkalemia. It is unlikely that it was due to mildly increased creatinine, which very quickly resolved with return of sinus rhythm. Perhaps rampiril played a role. Will therefore, try to hold off on ACE-I. If needed for BP, will start oral hydralazine.  3. Abnormal labs - probably due to drawing blood from the same side (but more proximal) that the patient was receiving IV Magnesium. Reordered.  4. CP - cardiac enzymes and ECG were unremarkable. Check another set of cardiac enzymes.

## 2018-05-14 NOTE — PHYSICAL THERAPY INITIAL EVALUATION ADULT - STRENGTHENING, PT EVAL
GOAL: Patient will demonstrate a 1/3 increase in strength where deficient within 4 weeks to assist with performing functional mobility and ADLs.

## 2018-05-14 NOTE — PROGRESS NOTE ADULT - ASSESSMENT
69 y/o F w/ hx of DM2, HTN, breast CA (dx in 2/2017), w recent hospitalization for respiratory arrest 2/2 influenza, complicated by aspiration pna, PEA arrest x2 with ROS, PE s/p tPA, ARF requiring HD, s/p trach and PEG tube presents with bradycardia and EKG concerning for arrythmia 2/2 medication side effect vs hyperkalemia.       #CV  - bradycardic to 30s in ED with ekg concerning for junctional rhythm and peaked T waves. Now resolved.   Concern for juanita agent toxicity (pt on both beta blockers and CCBs) in setting of CKD and hyperkalemia, elevated blood glucose.  - continuous telemetry  - atropine at bedside PRN, external pacing pads on pt  - hydralazine 10mg IV Q6H PRN for SBP > 160.    #Pulm  - s/p tracheostomy for chronic hypoxemic respiratory failure  - continue with trach collar  - duonebs Q6H    # Back abcess s/p i&Ded by micu team  cont zosyn, s/p 1 dose vanco, f/u cx/s    #Endo  -started on nph 13 units q6 hr. FS better controlled, continue to monitor   endo consult, r/o adrenal insufficiency, pt admitted with high k, low sodium    #Renal  hyperkalemia resolved  RUSS on CKD- Cr trending towards baseline  - Strict I&Os, cw lasix PRN  - renally dose all meds, avoid nephrotoxins      #FEN/GI  -continue with tube feeds  family requesting to start oral feeds-swallow eval pending    #ID  -WBC wnl and afebrile on presentation  - blood cx NGTD  -will cont to monitor off abx for now    #PPX  - heparin subQ

## 2018-05-14 NOTE — PROGRESS NOTE ADULT - SUBJECTIVE AND OBJECTIVE BOX
Patient is a 68y old  Female who presents with a chief complaint of Bradycardia (11 May 2018 23:46)      INTERVAL HPI/OVERNIGHT EVENTS:  feels well, no tele events  HR 60-99      Vital Signs Last 24 Hrs  T(C): 37.1 (14 May 2018 12:29), Max: 37.1 (13 May 2018 20:00)  T(F): 98.8 (14 May 2018 12:29), Max: 98.8 (14 May 2018 12:29)  HR: 67 (14 May 2018 15:13) (67 - 99)  BP: 135/80 (14 May 2018 15:13) (113/55 - 165/70)  BP(mean): 90 (13 May 2018 22:00) (90 - 100)  RR: 18 (14 May 2018 12:29) (16 - 20)  SpO2: 99% (14 May 2018 15:13) (96% - 100%)    ALBUTerol/ipratropium for Nebulization 3 milliLiter(s) Nebulizer every 4 hours  aspirin  chewable 81 milliGRAM(s) Oral daily  BACItracin   Ointment 1 Application(s) Topical daily  carvedilol 12.5 milliGRAM(s) Oral every 12 hours  clotrimazole 1% Cream 1 Application(s) Topical two times a day  heparin  Injectable 5000 Unit(s) SubCutaneous every 8 hours  hydrALAZINE Injectable 10 milliGRAM(s) IV Push every 6 hours PRN  insulin lispro (HumaLOG) corrective regimen sliding scale   SubCutaneous every 6 hours  insulin NPH human recombinant 13 Unit(s) SubCutaneous every 6 hours  piperacillin/tazobactam IVPB. 3.375 Gram(s) IV Intermittent every 8 hours      PHYSICAL EXAM:  GENERAL: NAD,  EYES: conjunctiva and sclera clear  ENMT: Moist mucous membranes, trach collar  NECK: Supple, No JVD, Normal thyroid  NERVOUS SYSTEM:  Alert & Oriented X3,   CHEST/LUNG: Clear to auscultation bilaterally; No rales, rhonchi, wheezing, or rubs  HEART: Regular rate and rhythm; No murmurs, rubs, or gallops  ABDOMEN: Soft, Nontender, Nondistended; Bowel sounds present, peg tube  EXTREMITIES:  2+ Peripheral Pulses, No clubbing, cyanosis, or edema  LYMPH: No lymphadenopathy noted  SKIN: No rashes or lesions    Consultant(s) Notes Reviewed:  [x ] YES  [ ] NO  Care Discussed with Consultants/Other Providers [ x] YES  [ ] NO    LABS:                        10.9   7.57  )-----------( Clumped    ( 14 May 2018 05:15 )             35.9     05-14    135  |  97  |  20  ----------------------------<  159<H>  4.8   |  25  |  1.03    Ca    10.6<H>      14 May 2018 06:50  Phos  3.9     05-14  Mg     >8.0     05-14    TPro  5.7<L>  /  Alb  2.6<L>  /  TBili  0.2  /  DBili  x   /  AST  33  /  ALT  27  /  AlkPhos  207<H>  05-14        CAPILLARY BLOOD GLUCOSE      POCT Blood Glucose.: 110 mg/dL (14 May 2018 17:42)  POCT Blood Glucose.: 124 mg/dL (14 May 2018 12:08)  POCT Blood Glucose.: 164 mg/dL (14 May 2018 05:55)  POCT Blood Glucose.: 142 mg/dL (13 May 2018 23:44)  POCT Blood Glucose.: 144 mg/dL (13 May 2018 18:40)            RADIOLOGY & ADDITIONAL TESTS:    Imaging Personally Reviewed:  [ x] YES  [ ] NO

## 2018-05-15 DIAGNOSIS — I10 ESSENTIAL (PRIMARY) HYPERTENSION: ICD-10-CM

## 2018-05-15 DIAGNOSIS — E11.65 TYPE 2 DIABETES MELLITUS WITH HYPERGLYCEMIA: ICD-10-CM

## 2018-05-15 DIAGNOSIS — E87.5 HYPERKALEMIA: ICD-10-CM

## 2018-05-15 LAB
ACTH SER-ACNC: 26 PG/ML — SIGNIFICANT CHANGE UP (ref 5–46)
ALBUMIN SERPL ELPH-MCNC: 3.1 G/DL — LOW (ref 3.3–5)
ALP SERPL-CCNC: 244 U/L — HIGH (ref 40–120)
ALT FLD-CCNC: 34 U/L — SIGNIFICANT CHANGE UP (ref 10–45)
ANION GAP SERPL CALC-SCNC: 15 MMOL/L — SIGNIFICANT CHANGE UP (ref 5–17)
AST SERPL-CCNC: 37 U/L — SIGNIFICANT CHANGE UP (ref 10–40)
BILIRUB SERPL-MCNC: 0.2 MG/DL — SIGNIFICANT CHANGE UP (ref 0.2–1.2)
BUN SERPL-MCNC: 23 MG/DL — SIGNIFICANT CHANGE UP (ref 7–23)
CALCIUM SERPL-MCNC: 9.9 MG/DL — SIGNIFICANT CHANGE UP (ref 8.4–10.5)
CHLORIDE SERPL-SCNC: 96 MMOL/L — SIGNIFICANT CHANGE UP (ref 96–108)
CO2 SERPL-SCNC: 23 MMOL/L — SIGNIFICANT CHANGE UP (ref 22–31)
CORTIS AM PEAK SERPL-MCNC: 21 UG/DL — HIGH (ref 6–18.4)
CREAT SERPL-MCNC: 1.24 MG/DL — SIGNIFICANT CHANGE UP (ref 0.5–1.3)
GLUCOSE BLDC GLUCOMTR-MCNC: 119 MG/DL — HIGH (ref 70–99)
GLUCOSE BLDC GLUCOMTR-MCNC: 157 MG/DL — HIGH (ref 70–99)
GLUCOSE BLDC GLUCOMTR-MCNC: 164 MG/DL — HIGH (ref 70–99)
GLUCOSE BLDC GLUCOMTR-MCNC: 169 MG/DL — HIGH (ref 70–99)
GLUCOSE SERPL-MCNC: 186 MG/DL — HIGH (ref 70–99)
HCT VFR BLD CALC: 41.5 % — SIGNIFICANT CHANGE UP (ref 34.5–45)
HGB BLD-MCNC: 13 G/DL — SIGNIFICANT CHANGE UP (ref 11.5–15.5)
MAGNESIUM SERPL-MCNC: 2.1 MG/DL — SIGNIFICANT CHANGE UP (ref 1.6–2.6)
MCHC RBC-ENTMCNC: 26.9 PG — LOW (ref 27–34)
MCHC RBC-ENTMCNC: 31.3 GM/DL — LOW (ref 32–36)
MCV RBC AUTO: 85.9 FL — SIGNIFICANT CHANGE UP (ref 80–100)
PHOSPHATE SERPL-MCNC: 5.5 MG/DL — HIGH (ref 2.5–4.5)
PLATELET # BLD AUTO: 267 K/UL — SIGNIFICANT CHANGE UP (ref 150–400)
POTASSIUM SERPL-MCNC: 5.1 MMOL/L — SIGNIFICANT CHANGE UP (ref 3.5–5.3)
POTASSIUM SERPL-SCNC: 5.1 MMOL/L — SIGNIFICANT CHANGE UP (ref 3.5–5.3)
PROT SERPL-MCNC: 6.8 G/DL — SIGNIFICANT CHANGE UP (ref 6–8.3)
RBC # BLD: 4.83 M/UL — SIGNIFICANT CHANGE UP (ref 3.8–5.2)
RBC # FLD: 15.3 % — HIGH (ref 10.3–14.5)
SODIUM SERPL-SCNC: 134 MMOL/L — LOW (ref 135–145)
WBC # BLD: 11.82 K/UL — HIGH (ref 3.8–10.5)
WBC # FLD AUTO: 11.82 K/UL — HIGH (ref 3.8–10.5)

## 2018-05-15 PROCEDURE — 99223 1ST HOSP IP/OBS HIGH 75: CPT | Mod: GC

## 2018-05-15 RX ORDER — INSULIN GLARGINE 100 [IU]/ML
15 INJECTION, SOLUTION SUBCUTANEOUS AT BEDTIME
Qty: 0 | Refills: 0 | Status: DISCONTINUED | OUTPATIENT
Start: 2018-05-15 | End: 2018-05-22

## 2018-05-15 RX ORDER — SODIUM CHLORIDE 9 MG/ML
1000 INJECTION INTRAMUSCULAR; INTRAVENOUS; SUBCUTANEOUS
Qty: 0 | Refills: 0 | Status: DISCONTINUED | OUTPATIENT
Start: 2018-05-15 | End: 2018-05-19

## 2018-05-15 RX ORDER — GLUCAGON INJECTION, SOLUTION 0.5 MG/.1ML
1 INJECTION, SOLUTION SUBCUTANEOUS ONCE
Qty: 0 | Refills: 0 | Status: DISCONTINUED | OUTPATIENT
Start: 2018-05-15 | End: 2018-05-22

## 2018-05-15 RX ORDER — INSULIN LISPRO 100/ML
VIAL (ML) SUBCUTANEOUS
Qty: 0 | Refills: 0 | Status: DISCONTINUED | OUTPATIENT
Start: 2018-05-15 | End: 2018-05-22

## 2018-05-15 RX ORDER — DEXTROSE 50 % IN WATER 50 %
12.5 SYRINGE (ML) INTRAVENOUS ONCE
Qty: 0 | Refills: 0 | Status: DISCONTINUED | OUTPATIENT
Start: 2018-05-15 | End: 2018-05-22

## 2018-05-15 RX ORDER — DEXTROSE 50 % IN WATER 50 %
15 SYRINGE (ML) INTRAVENOUS ONCE
Qty: 0 | Refills: 0 | Status: DISCONTINUED | OUTPATIENT
Start: 2018-05-15 | End: 2018-05-22

## 2018-05-15 RX ORDER — DEXTROSE 50 % IN WATER 50 %
25 SYRINGE (ML) INTRAVENOUS ONCE
Qty: 0 | Refills: 0 | Status: DISCONTINUED | OUTPATIENT
Start: 2018-05-15 | End: 2018-05-22

## 2018-05-15 RX ORDER — INSULIN LISPRO 100/ML
VIAL (ML) SUBCUTANEOUS
Qty: 0 | Refills: 0 | Status: DISCONTINUED | OUTPATIENT
Start: 2018-05-15 | End: 2018-05-15

## 2018-05-15 RX ORDER — SODIUM CHLORIDE 9 MG/ML
1000 INJECTION, SOLUTION INTRAVENOUS
Qty: 0 | Refills: 0 | Status: DISCONTINUED | OUTPATIENT
Start: 2018-05-15 | End: 2018-05-22

## 2018-05-15 RX ORDER — ONDANSETRON 8 MG/1
4 TABLET, FILM COATED ORAL ONCE
Qty: 0 | Refills: 0 | Status: COMPLETED | OUTPATIENT
Start: 2018-05-15 | End: 2018-05-15

## 2018-05-15 RX ADMIN — Medication 3 MILLILITER(S): at 10:39

## 2018-05-15 RX ADMIN — Medication 0: at 12:51

## 2018-05-15 RX ADMIN — HUMAN INSULIN 13 UNIT(S): 100 INJECTION, SUSPENSION SUBCUTANEOUS at 13:15

## 2018-05-15 RX ADMIN — Medication 1 APPLICATION(S): at 11:39

## 2018-05-15 RX ADMIN — HUMAN INSULIN 13 UNIT(S): 100 INJECTION, SUSPENSION SUBCUTANEOUS at 00:06

## 2018-05-15 RX ADMIN — CARVEDILOL PHOSPHATE 12.5 MILLIGRAM(S): 80 CAPSULE, EXTENDED RELEASE ORAL at 18:47

## 2018-05-15 RX ADMIN — ONDANSETRON 4 MILLIGRAM(S): 8 TABLET, FILM COATED ORAL at 11:39

## 2018-05-15 RX ADMIN — PIPERACILLIN AND TAZOBACTAM 25 GRAM(S): 4; .5 INJECTION, POWDER, LYOPHILIZED, FOR SOLUTION INTRAVENOUS at 06:43

## 2018-05-15 RX ADMIN — PIPERACILLIN AND TAZOBACTAM 25 GRAM(S): 4; .5 INJECTION, POWDER, LYOPHILIZED, FOR SOLUTION INTRAVENOUS at 21:49

## 2018-05-15 RX ADMIN — PIPERACILLIN AND TAZOBACTAM 25 GRAM(S): 4; .5 INJECTION, POWDER, LYOPHILIZED, FOR SOLUTION INTRAVENOUS at 00:06

## 2018-05-15 RX ADMIN — INSULIN GLARGINE 15 UNIT(S): 100 INJECTION, SOLUTION SUBCUTANEOUS at 21:49

## 2018-05-15 RX ADMIN — Medication 1 APPLICATION(S): at 17:54

## 2018-05-15 RX ADMIN — SODIUM CHLORIDE 75 MILLILITER(S): 9 INJECTION INTRAMUSCULAR; INTRAVENOUS; SUBCUTANEOUS at 18:48

## 2018-05-15 RX ADMIN — PIPERACILLIN AND TAZOBACTAM 25 GRAM(S): 4; .5 INJECTION, POWDER, LYOPHILIZED, FOR SOLUTION INTRAVENOUS at 13:16

## 2018-05-15 RX ADMIN — CARVEDILOL PHOSPHATE 12.5 MILLIGRAM(S): 80 CAPSULE, EXTENDED RELEASE ORAL at 06:44

## 2018-05-15 RX ADMIN — Medication 81 MILLIGRAM(S): at 11:39

## 2018-05-15 RX ADMIN — Medication 1: at 07:03

## 2018-05-15 RX ADMIN — Medication 1 APPLICATION(S): at 00:13

## 2018-05-15 RX ADMIN — Medication 3 MILLILITER(S): at 21:49

## 2018-05-15 RX ADMIN — HEPARIN SODIUM 5000 UNIT(S): 5000 INJECTION INTRAVENOUS; SUBCUTANEOUS at 00:06

## 2018-05-15 RX ADMIN — HUMAN INSULIN 13 UNIT(S): 100 INJECTION, SUSPENSION SUBCUTANEOUS at 06:44

## 2018-05-15 RX ADMIN — Medication 1 APPLICATION(S): at 08:13

## 2018-05-15 RX ADMIN — Medication 3 MILLILITER(S): at 18:48

## 2018-05-15 RX ADMIN — HEPARIN SODIUM 5000 UNIT(S): 5000 INJECTION INTRAVENOUS; SUBCUTANEOUS at 21:49

## 2018-05-15 RX ADMIN — Medication 3 MILLILITER(S): at 00:06

## 2018-05-15 RX ADMIN — HEPARIN SODIUM 5000 UNIT(S): 5000 INJECTION INTRAVENOUS; SUBCUTANEOUS at 06:44

## 2018-05-15 RX ADMIN — Medication 3 MILLILITER(S): at 06:43

## 2018-05-15 RX ADMIN — HEPARIN SODIUM 5000 UNIT(S): 5000 INJECTION INTRAVENOUS; SUBCUTANEOUS at 13:15

## 2018-05-15 RX ADMIN — Medication 3 MILLILITER(S): at 13:16

## 2018-05-15 NOTE — SWALLOW BEDSIDE ASSESSMENT ADULT - DIET PRIOR TO ADMI
Mechanical soft texture with thin liquid per transfer notes
Mechanical soft texture with thin liquid per transfer notes

## 2018-05-15 NOTE — SWALLOW BEDSIDE ASSESSMENT ADULT - SLP GENERAL OBSERVATIONS
Pt with paucity of verbal output, reduced verbal responsiveness. Hoarse vocal quality. Flat affect.
Pt received in bed. On trach collar 30%. PMV in place. Hoarse vocal quality. Paucity of verbal output. Inconsistent command following. English/Ross speaking.

## 2018-05-15 NOTE — SWALLOW BEDSIDE ASSESSMENT ADULT - SWALLOW EVAL: DIAGNOSIS
Rx check oral cavity, remove oral residue, small bites, sips, PMV must be in place Pt with known h/o mild oropharyngeal dysphagia s/p MBS on 4/16/18. Pt currently presents with an oral and suspected pharyngeal dysphagia characterized by prolonged mastication of soft solid texture, delayed oral transit time, delay in trigger of the pharyngeal swallow, mildly reduced hyolaryngeal elevation, repeat swallow post intake. No overt s/s laryngeal penetration/aspiration. Swallowing strategies as per MBS including small bites/sips, PMV in place were utilized.

## 2018-05-15 NOTE — CONSULT NOTE ADULT - PROBLEM SELECTOR RECOMMENDATION 2
-Continue NPH 13U q 6 hours while on tube feeds  -May need to adjust regimen once eating po  -F/U with Dr Weiner as outpt -Adjust insulin now that TF are off: Lantus 15 units sq qhs and mod scale tid-ac and qhs  -May need to adjust regimen once eating po  -F/U with Dr Weiner as outpt

## 2018-05-15 NOTE — PROGRESS NOTE ADULT - SUBJECTIVE AND OBJECTIVE BOX
Patient is a 68y old  Female who presents with a chief complaint of Bradycardia (11 May 2018 23:46)      INTERVAL HPI/OVERNIGHT EVENTS: episode of vomiting noted  pt has lot of phlegm/saliva retained in the mouth   per nurse needs frequent suctioning      Vital Signs Last 24 Hrs  T(C): 36.7 (15 May 2018 14:36), Max: 37.2 (15 May 2018 04:04)  T(F): 98.1 (15 May 2018 14:36), Max: 98.9 (15 May 2018 04:04)  HR: 89 (15 May 2018 14:36) (89 - 101)  BP: 110/71 (15 May 2018 14:36) (110/71 - 122/74)  BP(mean): --  RR: 18 (15 May 2018 14:36) (18 - 18)  SpO2: 94% (15 May 2018 14:36) (94% - 99%)    ALBUTerol/ipratropium for Nebulization 3 milliLiter(s) Nebulizer every 4 hours  aspirin  chewable 81 milliGRAM(s) Oral daily  BACItracin   Ointment 1 Application(s) Topical daily  carvedilol 12.5 milliGRAM(s) Oral every 12 hours  clotrimazole 1% Cream 1 Application(s) Topical two times a day  heparin  Injectable 5000 Unit(s) SubCutaneous every 8 hours  hydrALAZINE Injectable 10 milliGRAM(s) IV Push every 6 hours PRN  insulin lispro (HumaLOG) corrective regimen sliding scale   SubCutaneous every 6 hours  insulin NPH human recombinant 13 Unit(s) SubCutaneous every 6 hours  piperacillin/tazobactam IVPB. 3.375 Gram(s) IV Intermittent every 8 hours  sodium chloride 0.9%. 1000 milliLiter(s) IV Continuous <Continuous>      PHYSICAL EXAM:  GENERAL: NAD,   EYES: conjunctiva and sclera clear  ENMT: Moist mucous membranes  NECK: Supple, No JVD, Normal thyroid  NERVOUS SYSTEM:  Alert & Oriented X3,   CHEST/LUNG: Clear to auscultation bilaterally; No rales, rhonchi, wheezing, or rubs  HEART: Regular rate and rhythm; No murmurs, rubs, or gallops  ABDOMEN: Soft, Nontender, Nondistended; Bowel sounds present, PEG in place  EXTREMITIES:  2+ Peripheral Pulses, No clubbing, cyanosis, or edema  LYMPH: No lymphadenopathy noted  SKIN: No rashes or lesions    Consultant(s) Notes Reviewed:  [x ] YES  [ ] NO  Care Discussed with Consultants/Other Providers [ x] YES  [ ] NO    LABS:                        13.0   11.82 )-----------( 267      ( 15 May 2018 07:37 )             41.5     05-15    134<L>  |  96  |  23  ----------------------------<  186<H>  5.1   |  23  |  1.24    Ca    9.9      15 May 2018 07:00  Phos  5.5     05-15  Mg     2.1     05-15    TPro  6.8  /  Alb  3.1<L>  /  TBili  0.2  /  DBili  x   /  AST  37  /  ALT  34  /  AlkPhos  244<H>  05-15        CAPILLARY BLOOD GLUCOSE      POCT Blood Glucose.: 119 mg/dL (15 May 2018 12:09)  POCT Blood Glucose.: 164 mg/dL (15 May 2018 06:38)  POCT Blood Glucose.: 122 mg/dL (14 May 2018 23:41)  POCT Blood Glucose.: 110 mg/dL (14 May 2018 17:42)            RADIOLOGY & ADDITIONAL TESTS:    Imaging Personally Reviewed:  [x ] YES  [ ] NO

## 2018-05-15 NOTE — PROGRESS NOTE ADULT - ATTENDING COMMENTS
Sadie Carrasco MD  [facility]    Dr Palmer will be covering me starting   5/16 /18  Please page

## 2018-05-15 NOTE — CONSULT NOTE ADULT - ASSESSMENT
IMP:    68F w/ hx of DM2, HTN, breast CA, with recent hospitalization for respiratory arrest 2/2 influenza, complicated by aspiration pna, PEA arrest x2 with ROS, PE s/p tPA, ARF requiring HD, s/p trach and PEG tube presents with bradycardia and shortness of breath. GI consulted for evaluation of reported emesis. As per pt's , pt did not have emesis but rather coughed up thick secretions. Pt has been tolerating soft diet by mouth over past 4 weeks without nausea or vomiting. Pt's family requesting PEG removal.    PLAN:  - will plan for PEG removal at bedside tomorrow.  - continue with soft mechanical diet by mouth   - antiemetics prn

## 2018-05-15 NOTE — CONSULT NOTE ADULT - SUBJECTIVE AND OBJECTIVE BOX
HPI:  67 y/o F w/ hx of DM2, HTN, breast CA (dx in 2/2017), w recent hospitalization for respiratory arrest 2/2 influenza, complicated by aspiration pna, PEA arrest x2 with ROS, PE s/p tPA, ARF requiring HD, s/p trach and PEG tube presents with bradycardia and shortness of breath. Pt was brought in from rehab after found to be bradycardic w/ HR in 30s. Hx obtain via pt and family at bedside. Pt c/o worsening shortness of breath and chest pain today. She has been taking carvedilol, but extended release diltiazem was recently added to her medication regimen after the daughter asked for pt to be taken off clonidine for hypertension.     In ED, pts vs were T100.8 /51 RR16 SaO2 97%. Labs were significant for hyperkalemia to 6.9 and blood glucose of 362. EKG showed bradycardia and junctional rhythm and peaked T waves. She was given atropine 0.5mg x2, 10U insulin +D50, calcium gluconate x4, NaHCO3 x1, and lasix 40mg IV. Her HR improved after temporizing measures and she was admitted to the MICU for bradycardia in the setting of hyperkalemia. (11 May 2018 23:46)    Endocrine history: 68 yr F with       PAST MEDICAL & SURGICAL HISTORY:  Cardiac arrest  Stroke  Diabetes  Breast CA  H/O mastectomy, bilateral      FAMILY HISTORY:  No pertinent family history in first degree relatives      Social History:    Outpatient Medications:    MEDICATIONS  (STANDING):  ALBUTerol/ipratropium for Nebulization 3 milliLiter(s) Nebulizer every 4 hours  aspirin  chewable 81 milliGRAM(s) Oral daily  BACItracin   Ointment 1 Application(s) Topical daily  carvedilol 12.5 milliGRAM(s) Oral every 12 hours  clotrimazole 1% Cream 1 Application(s) Topical two times a day  heparin  Injectable 5000 Unit(s) SubCutaneous every 8 hours  insulin lispro (HumaLOG) corrective regimen sliding scale   SubCutaneous every 6 hours  insulin NPH human recombinant 13 Unit(s) SubCutaneous every 6 hours  piperacillin/tazobactam IVPB. 3.375 Gram(s) IV Intermittent every 8 hours  sodium chloride 0.9%. 1000 milliLiter(s) (75 mL/Hr) IV Continuous <Continuous>    MEDICATIONS  (PRN):  hydrALAZINE Injectable 10 milliGRAM(s) IV Push every 6 hours PRN SBP > 160      Allergies    doxycycline (Rash)  isoniazid (Rash)  NIFEdipine (Urticaria; Hives)  vitamin E (Short breath; Urticaria; Hives)    Intolerances      Review of Systems:  Constitutional: No fever  Eyes: No blurry vision  Neuro: No tremors  HEENT: No pain  Cardiovascular: No chest pain, palpitations  Respiratory: No SOB, no cough  GI: No nausea, vomiting, abdominal pain  : No dysuria  Skin: no rash  Psych: no depression  Endocrine: no polyuria, polydipsia  Hem/lymph: no swelling  Osteoporosis: no fractures    ALL OTHER SYSTEMS REVIEWED AND NEGATIVE    UNABLE TO OBTAIN    PHYSICAL EXAM:  VITALS: T(C): 36.7 (05-15-18 @ 14:36)  T(F): 98.1 (05-15-18 @ 14:36), Max: 98.9 (05-15-18 @ 04:04)  HR: 89 (05-15-18 @ 14:36) (89 - 101)  BP: 110/71 (05-15-18 @ 14:36) (110/71 - 122/74)  RR:  (18 - 18)  SpO2:  (94% - 99%)  Wt(kg): --  GENERAL: NAD, well-groomed, well-developed  EYES: No proptosis, no lid lag, anicteric  HEENT:  Atraumatic, Normocephalic, moist mucous membranes  THYROID: Normal size, no palpable nodules  RESPIRATORY: Clear to auscultation bilaterally; No rales, rhonchi, wheezing, or rubs  CARDIOVASCULAR: Regular rate and rhythm; No murmurs; no peripheral edema  GI: Soft, nontender, non distended, normal bowel sounds  SKIN: Dry, intact, No rashes or lesions  MUSCULOSKELETAL: Full range of motion, normal strength  NEURO: sensation intact, extraocular movements intact, no tremor, normal reflexes  PSYCH: Alert and oriented x 3, normal affect, normal mood  CUSHING'S SIGNS: no striae    POCT Blood Glucose.: 119 mg/dL (05-15-18 @ 12:09)  POCT Blood Glucose.: 164 mg/dL (05-15-18 @ 06:38)  POCT Blood Glucose.: 122 mg/dL (05-14-18 @ 23:41)  POCT Blood Glucose.: 110 mg/dL (05-14-18 @ 17:42)  POCT Blood Glucose.: 124 mg/dL (05-14-18 @ 12:08)  POCT Blood Glucose.: 164 mg/dL (05-14-18 @ 05:55)  POCT Blood Glucose.: 142 mg/dL (05-13-18 @ 23:44)  POCT Blood Glucose.: 144 mg/dL (05-13-18 @ 18:40)  POCT Blood Glucose.: 146 mg/dL (05-13-18 @ 12:55)  POCT Blood Glucose.: 193 mg/dL (05-13-18 @ 05:40)  POCT Blood Glucose.: 154 mg/dL (05-13-18 @ 00:39)  POCT Blood Glucose.: 201 mg/dL (05-12-18 @ 17:27)                            13.0   11.82 )-----------( 267      ( 15 May 2018 07:37 )             41.5       05-15    134<L>  |  96  |  23  ----------------------------<  186<H>  5.1   |  23  |  1.24    EGFR if : 51<L>  EGFR if non : 44<L>    Ca    9.9      05-15  Mg     2.1     05-15  Phos  5.5     05-15    TPro  6.8  /  Alb  3.1<L>  /  TBili  0.2  /  DBili  x   /  AST  37  /  ALT  34  /  AlkPhos  244<H>  05-15      Thyroid Function Tests:  05-11 @ 22:51 TSH 1.11 FreeT4 -- T3 -- Anti TPO -- Anti Thyroglobulin Ab -- TSI --              Radiology: HPI:  67 y/o F w/ hx of DM2, HTN, breast CA (dx in 2/2017), w recent hospitalization for respiratory arrest 2/2 influenza, complicated by aspiration pna, PEA arrest x2 with ROS, PE s/p tPA, ARF requiring HD, s/p trach and PEG tube presents with bradycardia and shortness of breath. Pt was brought in from rehab after found to be bradycardic w/ HR in 30s. Hx obtain via pt and family at bedside. Pt c/o worsening shortness of breath and chest pain today. She has been taking carvedilol, but extended release diltiazem was recently added to her medication regimen after the daughter asked for pt to be taken off clonidine for hypertension.     In ED, pts vs were T100.8 /51 RR16 SaO2 97%. Labs were significant for hyperkalemia to 6.9 and blood glucose of 362. EKG showed bradycardia and junctional rhythm and peaked T waves. She was given atropine 0.5mg x2, 10U insulin +D50, calcium gluconate x4, NaHCO3 x1, and lasix 40mg IV. Her HR improved after temporizing measures and she was admitted to the MICU for bradycardia in the setting of hyperkalemia. (11 May 2018 23:46)    Endocrine history: (obtained from ) 68 yr F with PMH of DM2 here with bradycardia, and found to have hyperkalemia. Patient has had recent hospitalization for respiratory failure, PEA arrest adn renal failure requiring HD. She has no history of pituitary or adrenal disorders. No FH of pituitary or adrenal disorders. She has not been on appetite stimulants such as megace, chronic opioids or chronic steroids. Nor is the patient on any other medications such as antiseizure medications or antifungal that are typically associated with AI. Patient's hyperkalemia has now resolved without steroids. She has been found to have AM cortisol of 21.     Patient also has a history of T2DM c/b neuropathy and CKD III as well as CVA. Her last A1C was 8.6 and she follows with Dr Weiner.. Her DM regimen is Humulin 70/30 43 U in AM and 28 U in PM as well as metformin ER 500mg TID. Her glucose levels are well controlled on NPH while receiving tube feeds.        PAST MEDICAL & SURGICAL HISTORY:  Cardiac arrest  Stroke  Diabetes  Breast CA  H/O mastectomy, bilateral      FAMILY HISTORY:  No FH of adrenal disorder      Social History:nonsmoker, nondrinker    Outpatient Medications:  · 	cloNIDine 0.1 mg oral tablet: 1 tab(s) orally 2 times a day, hold SBP<120  · 	metoclopramide 10 mg oral tablet: 1 tab(s) by gastrostomy tube every 8 hours  · 	epoetin odalis: 40470 unit(s) subcutaneously once a week on Weds  · 	nystatin 100,000 units/g topical powder: 1 application topically 2 times a day  · 	hydrocortisone 1% topical ointment: 1 application topically every 8 hours, As needed, Rash and/or Itching  · 	calamine topical lotion: 1 application topically once a day  · 	labetalol: 250 milligram(s) by gastrostomy tube 3 times a day, hold SBP<120 or HR<60  · 	ipratropium-albuterol 0.5 mg-2.5 mg/3 mLinhalation solution: 3 milliliter(s) inhaled every 6 hours  · 	aspirin 81 mg oral tablet, chewable: 1 tab(s) by gastrostomy tube once a day  · 	insulin isophane (NPH) 100 units/mL human recombinant subcutaneous suspension: 13 unit(s) subcutaneous every 6 hours  · 	heparin: 5000 unit(s) subcutaneous every 8 hours  · 	acetaminophen 160 mg/5 mL oral suspension: 20.31 milliliter(s) by gastrostomy tube every 6 hours, As Needed - 3)  · 	Renvela 800 mg oral tablet: 1 tab(s) orally 3 times a day (with meals)  · 	ferrous sulfate 324 mg (65 mg elemental iron) oral tablet: 1 tab(s) orally 3 times a day  · 	Senna leaf extract 176 mg/5 mL oral syrup: 10 milliliter(s) orally once a day (at bedtime), As Needed constipation         MEDICATIONS  (STANDING):  ALBUTerol/ipratropium for Nebulization 3 milliLiter(s) Nebulizer every 4 hours  aspirin  chewable 81 milliGRAM(s) Oral daily  BACItracin   Ointment 1 Application(s) Topical daily  carvedilol 12.5 milliGRAM(s) Oral every 12 hours  clotrimazole 1% Cream 1 Application(s) Topical two times a day  heparin  Injectable 5000 Unit(s) SubCutaneous every 8 hours  insulin lispro (HumaLOG) corrective regimen sliding scale   SubCutaneous every 6 hours  insulin NPH human recombinant 13 Unit(s) SubCutaneous every 6 hours  piperacillin/tazobactam IVPB. 3.375 Gram(s) IV Intermittent every 8 hours  sodium chloride 0.9%. 1000 milliLiter(s) (75 mL/Hr) IV Continuous <Continuous>    MEDICATIONS  (PRN):  hydrALAZINE Injectable 10 milliGRAM(s) IV Push every 6 hours PRN SBP > 160      Allergies    doxycycline (Rash)  isoniazid (Rash)  NIFEdipine (Urticaria; Hives)  vitamin E (Short breath; Urticaria; Hives)    Intolerances      Review of Systems:    UNABLE TO OBTAIN    PHYSICAL EXAM:  VITALS: T(C): 36.7 (05-15-18 @ 14:36)  T(F): 98.1 (05-15-18 @ 14:36), Max: 98.9 (05-15-18 @ 04:04)  HR: 89 (05-15-18 @ 14:36) (89 - 101)  BP: 110/71 (05-15-18 @ 14:36) (110/71 - 122/74)  RR:  (18 - 18)  SpO2:  (94% - 99%)  Wt(kg): --  GENERAL: NAD  EYES: No proptosis, no lid lag, anicteric  HEENT:  +Trach  RESPIRATORY: Clear to auscultation bilaterally; No rales, rhonchi, wheezing, or rubs  CARDIOVASCULAR: Regular rate and rhythm; No murmurs; no peripheral edema  GI: Soft, nontender +PEG  SKIN: Dry, intact, No rashes or lesions on feet bilaterally        POCT Blood Glucose.: 119 mg/dL (05-15-18 @ 12:09)  POCT Blood Glucose.: 164 mg/dL (05-15-18 @ 06:38)  POCT Blood Glucose.: 122 mg/dL (05-14-18 @ 23:41)  POCT Blood Glucose.: 110 mg/dL (05-14-18 @ 17:42)  POCT Blood Glucose.: 124 mg/dL (05-14-18 @ 12:08)  POCT Blood Glucose.: 164 mg/dL (05-14-18 @ 05:55)  POCT Blood Glucose.: 142 mg/dL (05-13-18 @ 23:44)  POCT Blood Glucose.: 144 mg/dL (05-13-18 @ 18:40)  POCT Blood Glucose.: 146 mg/dL (05-13-18 @ 12:55)  POCT Blood Glucose.: 193 mg/dL (05-13-18 @ 05:40)  POCT Blood Glucose.: 154 mg/dL (05-13-18 @ 00:39)  POCT Blood Glucose.: 201 mg/dL (05-12-18 @ 17:27)                            13.0   11.82 )-----------( 267      ( 15 May 2018 07:37 )             41.5       05-15    134<L>  |  96  |  23  ----------------------------<  186<H>  5.1   |  23  |  1.24    EGFR if : 51<L>  EGFR if non : 44<L>    Ca    9.9      05-15  Mg     2.1     05-15  Phos  5.5     05-15    TPro  6.8  /  Alb  3.1<L>  /  TBili  0.2  /  DBili  x   /  AST  37  /  ALT  34  /  AlkPhos  244<H>  05-15      Thyroid Function Tests:  05-11 @ 22:51 TSH 1.11 FreeT4 -- T3 -- Anti TPO -- Anti Thyroglobulin Ab -- TSI -- HPI:  69 y/o F w/ hx of DM2, HTN, breast CA (dx in 2/2017), w recent hospitalization for respiratory arrest 2/2 influenza, complicated by aspiration pna, PEA arrest x2 with ROS, PE s/p tPA, ARF requiring HD, s/p trach and PEG tube presents with bradycardia and shortness of breath. Pt was brought in from rehab after found to be bradycardic w/ HR in 30s. Hx obtain via pt and family at bedside. Pt c/o worsening shortness of breath and chest pain today. She has been taking carvedilol, but extended release diltiazem was recently added to her medication regimen after the daughter asked for pt to be taken off clonidine for hypertension.     In ED, pts vs were T100.8 /51 RR16 SaO2 97%. Labs were significant for hyperkalemia to 6.9 and blood glucose of 362. EKG showed bradycardia and junctional rhythm and peaked T waves. She was given atropine 0.5mg x2, 10U insulin +D50, calcium gluconate x4, NaHCO3 x1, and lasix 40mg IV. Her HR improved after temporizing measures and she was admitted to the MICU for bradycardia in the setting of hyperkalemia. (11 May 2018 23:46)    Endocrine history: (obtained from ) 68 yr F with PMH of DM2 here with bradycardia, and found to have hyperkalemia. Patient has had recent hospitalization for respiratory failure, PEA arrest adn renal failure requiring HD. She has no history of pituitary or adrenal disorders. No FH of pituitary or adrenal disorders. She has not been on appetite stimulants such as megace, chronic opioids or chronic steroids. Nor is the patient on any other medications such as antiseizure medications or antifungal that are typically associated with AI. Patient's hyperkalemia has now resolved without steroids. She has been found to have AM cortisol of 21.     Patient also has a history of T2DM c/b neuropathy and CKD III as well as CVA. Her last A1C was 8.6 and she follows with Dr Weiner.. Her DM regimen is Humulin 70/30 43 U in AM and 28 U in PM as well as metformin ER 500mg TID. Her glucose levels are well controlled on NPH while receiving tube feeds.        PAST MEDICAL & SURGICAL HISTORY:  Cardiac arrest  Stroke  Diabetes  Breast CA  H/O mastectomy, bilateral      FAMILY HISTORY:  No FH of adrenal disorder      Social History:nonsmoker, nondrinker    Outpatient Medications:  · 	cloNIDine 0.1 mg oral tablet: 1 tab(s) orally 2 times a day, hold SBP<120  · 	metoclopramide 10 mg oral tablet: 1 tab(s) by gastrostomy tube every 8 hours  · 	epoetin odalis: 07334 unit(s) subcutaneously once a week on Weds  · 	nystatin 100,000 units/g topical powder: 1 application topically 2 times a day  · 	hydrocortisone 1% topical ointment: 1 application topically every 8 hours, As needed, Rash and/or Itching  · 	calamine topical lotion: 1 application topically once a day  · 	labetalol: 250 milligram(s) by gastrostomy tube 3 times a day, hold SBP<120 or HR<60  · 	ipratropium-albuterol 0.5 mg-2.5 mg/3 mLinhalation solution: 3 milliliter(s) inhaled every 6 hours  · 	aspirin 81 mg oral tablet, chewable: 1 tab(s) by gastrostomy tube once a day  · 	heparin: 5000 unit(s) subcutaneous every 8 hours  · 	acetaminophen 160 mg/5 mL oral suspension: 20.31 milliliter(s) by gastrostomy tube every 6 hours, As Needed - 3)  · 	Renvela 800 mg oral tablet: 1 tab(s) orally 3 times a day (with meals)  · 	ferrous sulfate 324 mg (65 mg elemental iron) oral tablet: 1 tab(s) orally 3 times a day  · 	Senna leaf extract 176 mg/5 mL oral syrup: 10 milliliter(s) orally once a day (at bedtime), As Needed constipation   Humulin 70/30 43U in AM and 28U in PM  Metformin ER 500mg TID        MEDICATIONS  (STANDING):  ALBUTerol/ipratropium for Nebulization 3 milliLiter(s) Nebulizer every 4 hours  aspirin  chewable 81 milliGRAM(s) Oral daily  BACItracin   Ointment 1 Application(s) Topical daily  carvedilol 12.5 milliGRAM(s) Oral every 12 hours  clotrimazole 1% Cream 1 Application(s) Topical two times a day  heparin  Injectable 5000 Unit(s) SubCutaneous every 8 hours  insulin lispro (HumaLOG) corrective regimen sliding scale   SubCutaneous every 6 hours  insulin NPH human recombinant 13 Unit(s) SubCutaneous every 6 hours  piperacillin/tazobactam IVPB. 3.375 Gram(s) IV Intermittent every 8 hours  sodium chloride 0.9%. 1000 milliLiter(s) (75 mL/Hr) IV Continuous <Continuous>    MEDICATIONS  (PRN):  hydrALAZINE Injectable 10 milliGRAM(s) IV Push every 6 hours PRN SBP > 160      Allergies    doxycycline (Rash)  isoniazid (Rash)  NIFEdipine (Urticaria; Hives)  vitamin E (Short breath; Urticaria; Hives)    Intolerances      Review of Systems:    UNABLE TO OBTAIN    PHYSICAL EXAM:  VITALS: T(C): 36.7 (05-15-18 @ 14:36)  T(F): 98.1 (05-15-18 @ 14:36), Max: 98.9 (05-15-18 @ 04:04)  HR: 89 (05-15-18 @ 14:36) (89 - 101)  BP: 110/71 (05-15-18 @ 14:36) (110/71 - 122/74)  RR:  (18 - 18)  SpO2:  (94% - 99%)  Wt(kg): --  GENERAL: NAD  EYES: No proptosis, no lid lag, anicteric  HEENT:  +Trach  RESPIRATORY: Clear to auscultation bilaterally; No rales, rhonchi, wheezing, or rubs  CARDIOVASCULAR: Regular rate and rhythm; No murmurs; no peripheral edema  GI: Soft, nontender +PEG  SKIN: Dry, intact, No rashes or lesions on feet bilaterally        POCT Blood Glucose.: 119 mg/dL (05-15-18 @ 12:09)  POCT Blood Glucose.: 164 mg/dL (05-15-18 @ 06:38)  POCT Blood Glucose.: 122 mg/dL (05-14-18 @ 23:41)  POCT Blood Glucose.: 110 mg/dL (05-14-18 @ 17:42)  POCT Blood Glucose.: 124 mg/dL (05-14-18 @ 12:08)  POCT Blood Glucose.: 164 mg/dL (05-14-18 @ 05:55)  POCT Blood Glucose.: 142 mg/dL (05-13-18 @ 23:44)  POCT Blood Glucose.: 144 mg/dL (05-13-18 @ 18:40)  POCT Blood Glucose.: 146 mg/dL (05-13-18 @ 12:55)  POCT Blood Glucose.: 193 mg/dL (05-13-18 @ 05:40)  POCT Blood Glucose.: 154 mg/dL (05-13-18 @ 00:39)  POCT Blood Glucose.: 201 mg/dL (05-12-18 @ 17:27)                            13.0   11.82 )-----------( 267      ( 15 May 2018 07:37 )             41.5       05-15    134<L>  |  96  |  23  ----------------------------<  186<H>  5.1   |  23  |  1.24    EGFR if : 51<L>  EGFR if non : 44<L>    Ca    9.9      05-15  Mg     2.1     05-15  Phos  5.5     05-15    TPro  6.8  /  Alb  3.1<L>  /  TBili  0.2  /  DBili  x   /  AST  37  /  ALT  34  /  AlkPhos  244<H>  05-15      Thyroid Function Tests:  05-11 @ 22:51 TSH 1.11 FreeT4 -- T3 -- Anti TPO -- Anti Thyroglobulin Ab -- TSI -- HPI: 69 y/o F w/ hx of DM2, HTN, breast CA (dx in 2/2017), w recent hospitalization for respiratory arrest 2/2 influenza, complicated by aspiration pna, PEA arrest x2 with ROS, PE s/p tPA, ARF requiring HD, s/p trach and PEG tube presents with bradycardia and shortness of breath. Pt was brought in from rehab after found to be bradycardic w/ HR in 30s. Hx obtain via pt and family at bedside. Pt c/o worsening shortness of breath and chest pain today. She has been taking carvedilol, but extended release diltiazem was recently added to her medication regimen after the daughter asked for pt to be taken off clonidine for hypertension.     In ED, pts vs were T100.8 /51 RR16 SaO2 97%. Labs were significant for hyperkalemia to 6.9 and blood glucose of 362. EKG showed bradycardia and junctional rhythm and peaked T waves. She was given atropine 0.5mg x2, 10U insulin +D50, calcium gluconate x4, NaHCO3 x1, and lasix 40mg IV. Her HR improved after temporizing measures and she was admitted to the MICU for bradycardia in the setting of hyperkalemia. (11 May 2018 23:46)    Endocrine history: (obtained from ) 68 yr F with PMH of DM2 here with bradycardia, and found to have hyperkalemia. Patient has had recent hospitalization for respiratory failure, PEA arrest adn renal failure requiring HD. She has no history of pituitary or adrenal disorders. No FH of pituitary or adrenal disorders. She has not been on appetite stimulants such as megace, chronic opioids or chronic steroids. Nor is the patient on any other medications such as antiseizure medications or antifungal that are typically associated with AI. Patient's hyperkalemia has now resolved without steroids. She has been found to have AM cortisol of 21.     Patient also has a history of T2DM c/b neuropathy and CKD III as well as CVA. Her last A1C was 8.6 and she follows with Dr Weiner.. Her DM regimen is Humulin 70/30 43 U in AM and 28 U in PM as well as metformin ER 500mg TID. Her glucose levels are well controlled on NPH while receiving tube feeds.        PAST MEDICAL & SURGICAL HISTORY:  Cardiac arrest  Stroke  Diabetes  Breast CA  H/O mastectomy, bilateral      FAMILY HISTORY:  No FH of adrenal disorder      Social History:nonsmoker, nondrinker    Outpatient Medications:  · 	cloNIDine 0.1 mg oral tablet: 1 tab(s) orally 2 times a day, hold SBP<120  · 	metoclopramide 10 mg oral tablet: 1 tab(s) by gastrostomy tube every 8 hours  · 	epoetin odalis: 61560 unit(s) subcutaneously once a week on Weds  · 	nystatin 100,000 units/g topical powder: 1 application topically 2 times a day  · 	hydrocortisone 1% topical ointment: 1 application topically every 8 hours, As needed, Rash and/or Itching  · 	calamine topical lotion: 1 application topically once a day  · 	labetalol: 250 milligram(s) by gastrostomy tube 3 times a day, hold SBP<120 or HR<60  · 	ipratropium-albuterol 0.5 mg-2.5 mg/3 mLinhalation solution: 3 milliliter(s) inhaled every 6 hours  · 	aspirin 81 mg oral tablet, chewable: 1 tab(s) by gastrostomy tube once a day  · 	heparin: 5000 unit(s) subcutaneous every 8 hours  · 	acetaminophen 160 mg/5 mL oral suspension: 20.31 milliliter(s) by gastrostomy tube every 6 hours, As Needed - 3)  · 	Renvela 800 mg oral tablet: 1 tab(s) orally 3 times a day (with meals)  · 	ferrous sulfate 324 mg (65 mg elemental iron) oral tablet: 1 tab(s) orally 3 times a day  · 	Senna leaf extract 176 mg/5 mL oral syrup: 10 milliliter(s) orally once a day (at bedtime), As Needed constipation   Humulin 70/30 43U in AM and 28U in PM  Metformin ER 500mg TID        MEDICATIONS  (STANDING):  ALBUTerol/ipratropium for Nebulization 3 milliLiter(s) Nebulizer every 4 hours  aspirin  chewable 81 milliGRAM(s) Oral daily  BACItracin   Ointment 1 Application(s) Topical daily  carvedilol 12.5 milliGRAM(s) Oral every 12 hours  clotrimazole 1% Cream 1 Application(s) Topical two times a day  heparin  Injectable 5000 Unit(s) SubCutaneous every 8 hours  insulin lispro (HumaLOG) corrective regimen sliding scale   SubCutaneous every 6 hours  insulin NPH human recombinant 13 Unit(s) SubCutaneous every 6 hours  piperacillin/tazobactam IVPB. 3.375 Gram(s) IV Intermittent every 8 hours  sodium chloride 0.9%. 1000 milliLiter(s) (75 mL/Hr) IV Continuous <Continuous>    MEDICATIONS  (PRN):  hydrALAZINE Injectable 10 milliGRAM(s) IV Push every 6 hours PRN SBP > 160      Allergies    doxycycline (Rash)  isoniazid (Rash)  NIFEdipine (Urticaria; Hives)  vitamin E (Short breath; Urticaria; Hives)    Intolerances      Review of Systems:    UNABLE TO OBTAIN    PHYSICAL EXAM:  VITALS: T(C): 36.7 (05-15-18 @ 14:36)  T(F): 98.1 (05-15-18 @ 14:36), Max: 98.9 (05-15-18 @ 04:04)  HR: 89 (05-15-18 @ 14:36) (89 - 101)  BP: 110/71 (05-15-18 @ 14:36) (110/71 - 122/74)  RR:  (18 - 18)  SpO2:  (94% - 99%)  Wt(kg): --  GENERAL: NAD  EYES: No proptosis, no lid lag, anicteric  HEENT:  +Trach  RESPIRATORY: Clear to auscultation bilaterally; No rales, rhonchi, wheezing, or rubs  CARDIOVASCULAR: Regular rate and rhythm; No murmurs; no peripheral edema  GI: Soft, nontender +PEG  SKIN: Dry, intact, No rashes or lesions on feet bilaterally        POCT Blood Glucose.: 119 mg/dL (05-15-18 @ 12:09)  POCT Blood Glucose.: 164 mg/dL (05-15-18 @ 06:38)  POCT Blood Glucose.: 122 mg/dL (05-14-18 @ 23:41)  POCT Blood Glucose.: 110 mg/dL (05-14-18 @ 17:42)  POCT Blood Glucose.: 124 mg/dL (05-14-18 @ 12:08)  POCT Blood Glucose.: 164 mg/dL (05-14-18 @ 05:55)  POCT Blood Glucose.: 142 mg/dL (05-13-18 @ 23:44)  POCT Blood Glucose.: 144 mg/dL (05-13-18 @ 18:40)  POCT Blood Glucose.: 146 mg/dL (05-13-18 @ 12:55)  POCT Blood Glucose.: 193 mg/dL (05-13-18 @ 05:40)  POCT Blood Glucose.: 154 mg/dL (05-13-18 @ 00:39)  POCT Blood Glucose.: 201 mg/dL (05-12-18 @ 17:27)                            13.0   11.82 )-----------( 267      ( 15 May 2018 07:37 )             41.5       05-15    134<L>  |  96  |  23  ----------------------------<  186<H>  5.1   |  23  |  1.24    EGFR if : 51<L>  EGFR if non : 44<L>    Ca    9.9      05-15  Mg     2.1     05-15  Phos  5.5     05-15    TPro  6.8  /  Alb  3.1<L>  /  TBili  0.2  /  DBili  x   /  AST  37  /  ALT  34  /  AlkPhos  244<H>  05-15      Thyroid Function Tests:  05-11 @ 22:51 TSH 1.11 HPI: 67 y/o F w/ hx of DM2, HTN, breast CA (dx in 2/2017), w recent hospitalization for respiratory arrest 2/2 influenza, complicated by aspiration pna, PEA arrest x2 with ROS, PE s/p tPA, ARF requiring HD, s/p trach and PEG tube presents with bradycardia and shortness of breath. Pt was brought in from rehab after found to be bradycardic w/ HR in 30s. Hx obtain via pt and family at bedside. Pt c/o worsening shortness of breath and chest pain today. She has been taking carvedilol, but extended release diltiazem was recently added to her medication regimen after the daughter asked for pt to be taken off clonidine for hypertension.     In ED, pts vs were T100.8 /51 RR16 SaO2 97%. Labs were significant for hyperkalemia to 6.9 and blood glucose of 362. EKG showed bradycardia and junctional rhythm and peaked T waves. She was given atropine 0.5mg x2, 10U insulin +D50, calcium gluconate x4, NaHCO3 x1, and lasix 40mg IV. Her HR improved after temporizing measures and she was admitted to the MICU for bradycardia in the setting of hyperkalemia. (11 May 2018 23:46)    Endocrine history: (obtained from ) 68 yr F with PMH of DM2 here with bradycardia, and found to have hyperkalemia. Patient has had recent hospitalization for respiratory failure, PEA arrest adn renal failure requiring HD. She has no history of pituitary or adrenal disorders. No FH of pituitary or adrenal disorders. She has not been on appetite stimulants such as megace, chronic opioids or chronic steroids. Nor is the patient on any other medications such as antiseizure medications or antifungal that are typically associated with AI. Patient's hyperkalemia has now resolved without steroids. She has been found to have AM cortisol of 21.     Patient also has a history of T2DM c/b neuropathy and CKD III as well as CVA. Her last A1C was 8.6 and she follows with Dr Weiner.. Her DM regimen is Humulin 70/30 43 U in AM and 28 U in PM as well as metformin ER 500mg TID. Her glucose levels are well controlled on NPH while receiving tube feeds.        PAST MEDICAL & SURGICAL HISTORY:  Cardiac arrest  Stroke  Diabetes  Breast CA  H/O mastectomy, bilateral      FAMILY HISTORY:  No FH of adrenal disorder      Social History:nonsmoker, nondrinker    Outpatient Medications:  · 	cloNIDine 0.1 mg oral tablet: 1 tab(s) orally 2 times a day, hold SBP<120  · 	metoclopramide 10 mg oral tablet: 1 tab(s) by gastrostomy tube every 8 hours  · 	epoetin odalis: 23067 unit(s) subcutaneously once a week on Weds  · 	nystatin 100,000 units/g topical powder: 1 application topically 2 times a day  · 	hydrocortisone 1% topical ointment: 1 application topically every 8 hours, As needed, Rash and/or Itching  · 	calamine topical lotion: 1 application topically once a day  · 	labetalol: 250 milligram(s) by gastrostomy tube 3 times a day, hold SBP<120 or HR<60  · 	ipratropium-albuterol 0.5 mg-2.5 mg/3 mLinhalation solution: 3 milliliter(s) inhaled every 6 hours  · 	aspirin 81 mg oral tablet, chewable: 1 tab(s) by gastrostomy tube once a day  · 	heparin: 5000 unit(s) subcutaneous every 8 hours  · 	acetaminophen 160 mg/5 mL oral suspension: 20.31 milliliter(s) by gastrostomy tube every 6 hours, As Needed - 3)  · 	Renvela 800 mg oral tablet: 1 tab(s) orally 3 times a day (with meals)  · 	ferrous sulfate 324 mg (65 mg elemental iron) oral tablet: 1 tab(s) orally 3 times a day  · 	Senna leaf extract 176 mg/5 mL oral syrup: 10 milliliter(s) orally once a day (at bedtime), As Needed constipation   Humulin 70/30 43U in AM and 28U in PM  Metformin ER 500mg TID        MEDICATIONS  (STANDING):  ALBUTerol/ipratropium for Nebulization 3 milliLiter(s) Nebulizer every 4 hours  aspirin  chewable 81 milliGRAM(s) Oral daily  BACItracin   Ointment 1 Application(s) Topical daily  carvedilol 12.5 milliGRAM(s) Oral every 12 hours  clotrimazole 1% Cream 1 Application(s) Topical two times a day  heparin  Injectable 5000 Unit(s) SubCutaneous every 8 hours  insulin lispro (HumaLOG) corrective regimen sliding scale   SubCutaneous every 6 hours  insulin NPH human recombinant 13 Unit(s) SubCutaneous every 6 hours  piperacillin/tazobactam IVPB. 3.375 Gram(s) IV Intermittent every 8 hours  sodium chloride 0.9%. 1000 milliLiter(s) (75 mL/Hr) IV Continuous <Continuous>    MEDICATIONS  (PRN):  hydrALAZINE Injectable 10 milliGRAM(s) IV Push every 6 hours PRN SBP > 160      Allergies    doxycycline (Rash)  isoniazid (Rash)  NIFEdipine (Urticaria; Hives)  vitamin E (Short breath; Urticaria; Hives)      Review of Systems:  UNABLE TO OBTAIN DUE TO LETHARGY    PHYSICAL EXAM:  VITALS: T(C): 36.7 (05-15-18 @ 14:36)  T(F): 98.1 (05-15-18 @ 14:36), Max: 98.9 (05-15-18 @ 04:04)  HR: 89 (05-15-18 @ 14:36) (89 - 101)  BP: 110/71 (05-15-18 @ 14:36) (110/71 - 122/74)  RR:  (18 - 18)  SpO2:  (94% - 99%)  Wt(kg): --  GENERAL: NAD  EYES: No proptosis, no lid lag, anicteric  HEENT:  +Trach  RESPIRATORY: Clear to auscultation bilaterally; No rales, rhonchi, wheezing, or rubs  CARDIOVASCULAR: Regular rate and rhythm; No murmurs; no peripheral edema  GI: Soft, nontender +PEG  SKIN: Dry, intact, No rashes or lesions on feet bilaterally        POCT Blood Glucose.: 119 mg/dL (05-15-18 @ 12:09)  POCT Blood Glucose.: 164 mg/dL (05-15-18 @ 06:38)  POCT Blood Glucose.: 122 mg/dL (05-14-18 @ 23:41)  POCT Blood Glucose.: 110 mg/dL (05-14-18 @ 17:42)  POCT Blood Glucose.: 124 mg/dL (05-14-18 @ 12:08)  POCT Blood Glucose.: 164 mg/dL (05-14-18 @ 05:55)  POCT Blood Glucose.: 142 mg/dL (05-13-18 @ 23:44)  POCT Blood Glucose.: 144 mg/dL (05-13-18 @ 18:40)  POCT Blood Glucose.: 146 mg/dL (05-13-18 @ 12:55)  POCT Blood Glucose.: 193 mg/dL (05-13-18 @ 05:40)  POCT Blood Glucose.: 154 mg/dL (05-13-18 @ 00:39)  POCT Blood Glucose.: 201 mg/dL (05-12-18 @ 17:27)                            13.0   11.82 )-----------( 267      ( 15 May 2018 07:37 )             41.5       05-15    134<L>  |  96  |  23  ----------------------------<  186<H>  5.1   |  23  |  1.24    EGFR if : 51<L>  EGFR if non : 44<L>    Ca    9.9      05-15  Mg     2.1     05-15  Phos  5.5     05-15    TPro  6.8  /  Alb  3.1<L>  /  TBili  0.2  /  DBili  x   /  AST  37  /  ALT  34  /  AlkPhos  244<H>  05-15      Thyroid Function Tests:  05-11 @ 22:51 TSH 1.11

## 2018-05-15 NOTE — CONSULT NOTE ADULT - SUBJECTIVE AND OBJECTIVE BOX
Chief Complaint:  Patient is a 68y old  Female who presents with a chief complaint of Bradycardia (11 May 2018 23:46)      HPI: 69 y/o F w/ hx of DM2, HTN, breast CA (dx in 2017), w recent hospitalization for respiratory arrest 2/2 influenza, complicated by aspiration pna, PEA arrest x2 with ROS, PE s/p tPA, ARF requiring HD, s/p trach and PEG tube presents with bradycardia and shortness of breath. Pt was brought in from rehab after found to be bradycardic w/ HR in 30s. Hx obtain via pt and family at bedside. Pt c/o worsening shortness of breath and chest pain today. She has been taking carvedilol, but extended release diltiazem was recently added to her medication regimen after the daughter asked for pt to be taken off clonidine for hypertension.     In ED, pts vs were T100.8 /51 RR16 SaO2 97%. Labs were significant for hyperkalemia to 6.9 and blood glucose of 362. EKG showed bradycardia and junctional rhythm and peaked T waves. She was given atropine 0.5mg x2, 10U insulin +D50, calcium gluconate x4, NaHCO3 x1, and lasix 40mg IV. Her HR improved after temporizing measures and she was admitted to the MICU for bradycardia in the setting of hyperkalemia. Now transferred to floors.     Allergies:  doxycycline (Rash)  isoniazid (Rash)  NIFEdipine (Urticaria; Hives)  vitamin E (Short breath; Urticaria; Hives)      Home Medications:    Hospital Medications:  ALBUTerol/ipratropium for Nebulization 3 milliLiter(s) Nebulizer every 4 hours  aspirin  chewable 81 milliGRAM(s) Oral daily  BACItracin   Ointment 1 Application(s) Topical daily  carvedilol 12.5 milliGRAM(s) Oral every 12 hours  clotrimazole 1% Cream 1 Application(s) Topical two times a day  heparin  Injectable 5000 Unit(s) SubCutaneous every 8 hours  hydrALAZINE Injectable 10 milliGRAM(s) IV Push every 6 hours PRN  insulin lispro (HumaLOG) corrective regimen sliding scale   SubCutaneous every 6 hours  insulin NPH human recombinant 13 Unit(s) SubCutaneous every 6 hours  piperacillin/tazobactam IVPB. 3.375 Gram(s) IV Intermittent every 8 hours  sodium chloride 0.9%. 1000 milliLiter(s) IV Continuous <Continuous>      PMHX/PSHX:  Cardiac arrest  Stroke  Diabetes  Breast CA  H/O mastectomy, bilateral  No significant past surgical history      Family history:  No pertinent family history in first degree relatives      Social History:     ROS: as per HPI       PHYSICAL EXAM:   Vital Signs:  Vital Signs Last 24 Hrs  T(C): 36.7 (15 May 2018 14:36), Max: 37.2 (15 May 2018 04:04)  T(F): 98.1 (15 May 2018 14:36), Max: 98.9 (15 May 2018 04:04)  HR: 89 (15 May 2018 14:36) (89 - 101)  BP: 110/71 (15 May 2018 14:36) (110/71 - 122/74)  BP(mean): --  RR: 18 (15 May 2018 14:36) (18 - 18)  SpO2: 94% (15 May 2018 14:36) (94% - 99%)  Daily     Daily Weight in k.3 (15 May 2018 14:36)    GENERAL:  Appears stated age, well-groomed, well-nourished, no distress  HEENT:  NC/AT,  conjunctivae clear and pink, no thyromegaly, nodules, adenopathy, no JVD, sclera -anicteric  CHEST:  Full & symmetric excursion, no increased effort, breath sounds clear  HEART:  Regular rhythm, S1, S2, no murmur/rub/S3/S4, no abdominal bruit, no edema  ABDOMEN:  Soft, non-tender, non-distended, normoactive bowel sounds,  no masses ,no hepato-splenomegaly, no signs of chronic liver disease  EXTEREMITIES:  no cyanosis,clubbing or edema  SKIN:  No rash/erythema/ecchymoses/petechiae/wounds/abscess/warm/dry  NEURO:  Alert, oriented, no asterixis, no tremor, no encephalopathy    LABS:                        13.0   11.82 )-----------( 267      ( 15 May 2018 07:37 )             41.5     05-15    134<L>  |  96  |  23  ----------------------------<  186<H>  5.1   |  23  |  1.24    Ca    9.9      15 May 2018 07:00  Phos  5.5     05-15  Mg     2.1     05-15    TPro  6.8  /  Alb  3.1<L>  /  TBili  0.2  /  DBili  x   /  AST  37  /  ALT  34  /  AlkPhos  244<H>  -15    LIVER FUNCTIONS - ( 15 May 2018 07:00 )  Alb: 3.1 g/dL / Pro: 6.8 g/dL / ALK PHOS: 244 U/L / ALT: 34 U/L / AST: 37 U/L / GGT: x                   Imaging:

## 2018-05-15 NOTE — CONSULT NOTE ADULT - ASSESSMENT
68 yr F with T2Dm c/b neuropathy, CVA and CKD III here with hyperkalemia and bradycardia. Endocrine consulted due to concern for AI. 68 yr F with recent hospitalization for respiratory failure, s/p PEA arrest now with Trach and PEG and hx of T2DM c/b neuropathy, CVA and CKD III here with hyperkalemia and bradycardia. Endocrine consulted due to concern for AI.

## 2018-05-15 NOTE — SWALLOW BEDSIDE ASSESSMENT ADULT - ASR SWALLOW ASPIRATION MONITOR
throat clearing/cough/change of breathing pattern/upper respiratory infection/fever/pneumonia/gurgly voice/Monitor for s/s aspiration/laryngeal penetration. If noted:  D/C p.o. intake, provide non-oral nutrition/hydration/meds, and contact this service @ [phone]

## 2018-05-15 NOTE — SWALLOW BEDSIDE ASSESSMENT ADULT - SWALLOW EVAL: RECOMMENDED FEEDING/EATING TECHNIQUES
allow for swallow between intakes/no straws/small sips/bites/maintain upright posture during/after eating for 30 mins/**PMV in place during PO intake./check mouth frequently for oral residue/pocketing/crush medication (when feasible)/position upright (90 degrees) **PMV in place during PO intake; eliminate vocalizing during PO intake; reduce external distractions during meals/maintain upright posture during/after eating for 30 mins/no straws/allow for swallow between intakes/small sips/bites/check mouth frequently for oral residue/pocketing/crush medication (when feasible)/position upright (90 degrees)

## 2018-05-15 NOTE — SWALLOW BEDSIDE ASSESSMENT ADULT - MODE OF PRESENTATION
fed by clinician/self fed/spoon fed by clinician/Pt independently took small amount per intake/self fed self fed/Pt independently took small, single sips/cup

## 2018-05-15 NOTE — SWALLOW BEDSIDE ASSESSMENT ADULT - COMMENTS
Hx cont: Her HR improved after temporizing measures and she was admitted to the MICU for bradycardia in the setting of hyperkalemia. Cards suspects that the hyperkalemia played a larger role than the combination of carvedilol and cardizem. Pt with oropharyngeal dysphagia now s/p PEG but with improved bulbar function and now on dysphagia diet as per daughter. Per family at bedside, PEG has not been used for weeks and was scheduled to be removed. Will need assessment to determine if it's still usable. Xray confirmed PEG tube placement, feed were restarted. No signs of infection. Respiratory status stable and w/ trach w/ speaking valve. No pressors needed. Blood glucose noted to be persistently elevated therefore pt was started on an insulin gtt and transitioned to NPH. Pt noted with erythematous papule with firm consistency, small white dots (pus?)->vanco x 1 given, will treat with topical bacitracin. Per MICU, follow up with speech and swallow evaluation possible discontinuation of PEG.     SEE SWALLOW HISTORY IN ADDENDUM BELOW

## 2018-05-15 NOTE — PROGRESS NOTE ADULT - ASSESSMENT
69 y/o F w/ hx of DM2, HTN, breast CA (dx in 2/2017), w recent hospitalization for respiratory arrest 2/2 influenza, complicated by aspiration pna, PEA arrest x2 with ROS, PE s/p tPA, ARF requiring HD, s/p trach and PEG tube presents with bradycardia and EKG concerning for arrythmia 2/2 medication side effect vs hyperkalemia.       #Cards  - bradycardic to 30s in ED with ekg concerning for junctional rhythm and peaked T waves. Now resolved.   Concern for juanita agent toxicity (pt on both beta blockers and CCBs) in setting of CKD and hyperkalemia,  - hydralazine 10mg IV Q6H PRN for SBP > 160.    #Pulm  - s/p tracheostomy for chronic hypoxemic respiratory failure  - continue with trach collar  - duonebs Q6H    # Back abcess s/p i&Ded by micu team  cont zosyn, s/p 1 dose vanco, f/u cx/s  ID c/s    #Endo  -started on nph 13 units q6 hr. FS better controlled, continue to monitor    r/o adrenal insufficiency, pt admitted with high k, low sodium  check cortisol level    #Renal  start gentle iv hydration  RUSS on CKD- Cr trend monitor  - Strict I&Os,   - renally dose all meds, avoid nephrotoxins      #FEN/GI   pt nauseous and vomiting  -continue with tube feeds  family requesting to start oral feeds-swallow eval pending  Gi eval      #PPX  - heparin subQ

## 2018-05-15 NOTE — PROGRESS NOTE ADULT - SUBJECTIVE AND OBJECTIVE BOX
CARDIOLOGY FOLLOW UP NOTE - DR. DAMION ROSENBERG    Patient states no new complaints.    MEDICATIONS  (STANDING):  ALBUTerol/ipratropium for Nebulization 3 milliLiter(s) Nebulizer every 4 hours  aspirin  chewable 81 milliGRAM(s) Oral daily  BACItracin   Ointment 1 Application(s) Topical daily  carvedilol 12.5 milliGRAM(s) Oral every 12 hours  clotrimazole 1% Cream 1 Application(s) Topical two times a day  heparin  Injectable 5000 Unit(s) SubCutaneous every 8 hours  insulin lispro (HumaLOG) corrective regimen sliding scale   SubCutaneous every 6 hours  insulin NPH human recombinant 13 Unit(s) SubCutaneous every 6 hours  ondansetron Injectable 4 milliGRAM(s) IV Push once  piperacillin/tazobactam IVPB. 3.375 Gram(s) IV Intermittent every 8 hours        PHYSICAL EXAM:  Vital Signs Last 24 Hrs  T(C): 37.2 (15 May 2018 04:04), Max: 37.2 (15 May 2018 04:04)  T(F): 98.9 (15 May 2018 04:04), Max: 98.9 (15 May 2018 04:04)  HR: 101 (15 May 2018 04:04) (67 - 101)  BP: 122/74 (15 May 2018 04:04) (111/74 - 135/80)  BP(mean): --  RR: 18 (15 May 2018 04:04) (18 - 18)  SpO2: 98% (15 May 2018 04:04) (97% - 99%)  I&O's Summary    14 May 2018 07:01  -  15 May 2018 07:00  --------------------------------------------------------  IN: 1020 mL / OUT: 0 mL / NET: 1020 mL    15 May 2018 07:01  -  15 May 2018 11:23  --------------------------------------------------------  IN: 0 mL / OUT: 0 mL / NET: 0 mL        Telemetry:  sinus 80's-100's    General: NAD	  HEENT:   No JVD, + trach	  Cardiovascular: RRR, Normal S1 and S2, No murmurs/gallops/rubs  Respiratory: Lungs clear to auscultation	  Gastrointestinal:  Soft, Non-tender, + BS	  Extremities: No edema    	    LABS:                          13.0   11.82 )-----------( 267      ( 15 May 2018 07:37 )             41.5     05-15    134<L>  |  96  |  23  ----------------------------<  186<H>  5.1   |  23  |  1.24    Ca    9.9      15 May 2018 07:00  Phos  5.5     05-15  Mg     2.1     05-15    TPro  6.8  /  Alb  3.1<L>  /  TBili  0.2  /  DBili  x   /  AST  37  /  ALT  34  /  AlkPhos  244<H>  05-15        Assessment and Plan:  69 y/o F w/ hx of DM2, HTN, breast CA (dx in 2/2017), with recent hospitalization for respiratory arrest 2/2 influenza, complicated by aspiration PNA, PEA arrest x2, PE s/p tPA, ARF requiring temporary HD, s/p trach and PEG tube, who presents with bradycardia  1. Bradycardia - pt had a sinus arrest with a junctional escape rhythm, in the setting of hyperkalemia and two AV juanita blockers. I suspect that the hyperkalemia played a larger role than the combination of carvedilol and cardizem, especially since she does not have other evidence of significant conduction disease on her ECG. Unclear etiology of hyperkalemia (maybe due to ramipril), although she did present with an RUSS as well, with elevated creatinine, which has already resolved (although the increased creatinine could also have been due to poor renal perfusion in the setting of significant bradycardia). As soon as hyperkalemia was treated, her sinus node kicked-in. HR is now in the 70's-110's, and BP is increasing. She was taking carvedilol 25mg in am and 12.5mg in pm, per the daughter. Increase carvedilol at 12.5mg bid today and will continue to follow on telemetry.  2. HTN - controlled; will continue carvedilol 12.5mg bid. Pt was on Ramipril in the past as well. Unclear etiology of hyperkalemia. It is unlikely that it was due to mildly increased creatinine, which very quickly resolved with return of sinus rhythm. Perhaps rampiril played a role. Will therefore, try to hold off on ACE-I. If needed for BP, will start oral hydralazine.  3. CP - cardiac enzymes and ECG were unremarkable.

## 2018-05-16 DIAGNOSIS — Z93.0 TRACHEOSTOMY STATUS: ICD-10-CM

## 2018-05-16 DIAGNOSIS — L02.212 CUTANEOUS ABSCESS OF BACK [ANY PART, EXCEPT BUTTOCK]: ICD-10-CM

## 2018-05-16 LAB
ANION GAP SERPL CALC-SCNC: 13 MMOL/L — SIGNIFICANT CHANGE UP (ref 5–17)
BUN SERPL-MCNC: 18 MG/DL — SIGNIFICANT CHANGE UP (ref 7–23)
CALCIUM SERPL-MCNC: 9.3 MG/DL — SIGNIFICANT CHANGE UP (ref 8.4–10.5)
CHLORIDE SERPL-SCNC: 99 MMOL/L — SIGNIFICANT CHANGE UP (ref 96–108)
CO2 SERPL-SCNC: 24 MMOL/L — SIGNIFICANT CHANGE UP (ref 22–31)
CREAT SERPL-MCNC: 1.15 MG/DL — SIGNIFICANT CHANGE UP (ref 0.5–1.3)
GLUCOSE BLDC GLUCOMTR-MCNC: 133 MG/DL — HIGH (ref 70–99)
GLUCOSE BLDC GLUCOMTR-MCNC: 147 MG/DL — HIGH (ref 70–99)
GLUCOSE BLDC GLUCOMTR-MCNC: 183 MG/DL — HIGH (ref 70–99)
GLUCOSE BLDC GLUCOMTR-MCNC: 286 MG/DL — HIGH (ref 70–99)
GLUCOSE SERPL-MCNC: 117 MG/DL — HIGH (ref 70–99)
HBA1C BLD-MCNC: 7.4 % — HIGH (ref 4–5.6)
HCT VFR BLD CALC: 36.4 % — SIGNIFICANT CHANGE UP (ref 34.5–45)
HGB BLD-MCNC: 11.8 G/DL — SIGNIFICANT CHANGE UP (ref 11.5–15.5)
MCHC RBC-ENTMCNC: 27.4 PG — SIGNIFICANT CHANGE UP (ref 27–34)
MCHC RBC-ENTMCNC: 32.4 GM/DL — SIGNIFICANT CHANGE UP (ref 32–36)
MCV RBC AUTO: 84.7 FL — SIGNIFICANT CHANGE UP (ref 80–100)
PLATELET # BLD AUTO: 266 K/UL — SIGNIFICANT CHANGE UP (ref 150–400)
POTASSIUM SERPL-MCNC: 4.8 MMOL/L — SIGNIFICANT CHANGE UP (ref 3.5–5.3)
POTASSIUM SERPL-SCNC: 4.8 MMOL/L — SIGNIFICANT CHANGE UP (ref 3.5–5.3)
RBC # BLD: 4.3 M/UL — SIGNIFICANT CHANGE UP (ref 3.8–5.2)
RBC # FLD: 15.5 % — HIGH (ref 10.3–14.5)
SODIUM SERPL-SCNC: 136 MMOL/L — SIGNIFICANT CHANGE UP (ref 135–145)
WBC # BLD: 8.68 K/UL — SIGNIFICANT CHANGE UP (ref 3.8–10.5)
WBC # FLD AUTO: 8.68 K/UL — SIGNIFICANT CHANGE UP (ref 3.8–10.5)

## 2018-05-16 PROCEDURE — 99232 SBSQ HOSP IP/OBS MODERATE 35: CPT

## 2018-05-16 PROCEDURE — 31575 DIAGNOSTIC LARYNGOSCOPY: CPT | Mod: 59

## 2018-05-16 PROCEDURE — 31615 TRCHEOBRNCHSC EST TRACHS INC: CPT

## 2018-05-16 PROCEDURE — 99222 1ST HOSP IP/OBS MODERATE 55: CPT | Mod: GC

## 2018-05-16 PROCEDURE — 99222 1ST HOSP IP/OBS MODERATE 55: CPT | Mod: 25

## 2018-05-16 PROCEDURE — 99222 1ST HOSP IP/OBS MODERATE 55: CPT

## 2018-05-16 RX ORDER — INSULIN LISPRO 100/ML
VIAL (ML) SUBCUTANEOUS AT BEDTIME
Qty: 0 | Refills: 0 | Status: DISCONTINUED | OUTPATIENT
Start: 2018-05-16 | End: 2018-05-22

## 2018-05-16 RX ORDER — INSULIN LISPRO 100/ML
5 VIAL (ML) SUBCUTANEOUS
Qty: 0 | Refills: 0 | Status: DISCONTINUED | OUTPATIENT
Start: 2018-05-16 | End: 2018-05-21

## 2018-05-16 RX ADMIN — Medication 3 MILLILITER(S): at 06:36

## 2018-05-16 RX ADMIN — HEPARIN SODIUM 5000 UNIT(S): 5000 INJECTION INTRAVENOUS; SUBCUTANEOUS at 15:43

## 2018-05-16 RX ADMIN — HEPARIN SODIUM 5000 UNIT(S): 5000 INJECTION INTRAVENOUS; SUBCUTANEOUS at 06:35

## 2018-05-16 RX ADMIN — Medication 3 MILLILITER(S): at 18:27

## 2018-05-16 RX ADMIN — Medication 1 APPLICATION(S): at 14:42

## 2018-05-16 RX ADMIN — HEPARIN SODIUM 5000 UNIT(S): 5000 INJECTION INTRAVENOUS; SUBCUTANEOUS at 21:06

## 2018-05-16 RX ADMIN — Medication 81 MILLIGRAM(S): at 13:27

## 2018-05-16 RX ADMIN — Medication 3: at 12:21

## 2018-05-16 RX ADMIN — CARVEDILOL PHOSPHATE 12.5 MILLIGRAM(S): 80 CAPSULE, EXTENDED RELEASE ORAL at 06:35

## 2018-05-16 RX ADMIN — Medication 1 APPLICATION(S): at 08:07

## 2018-05-16 RX ADMIN — Medication 5 UNIT(S): at 18:16

## 2018-05-16 RX ADMIN — Medication 1 APPLICATION(S): at 21:02

## 2018-05-16 RX ADMIN — PIPERACILLIN AND TAZOBACTAM 25 GRAM(S): 4; .5 INJECTION, POWDER, LYOPHILIZED, FOR SOLUTION INTRAVENOUS at 06:36

## 2018-05-16 RX ADMIN — Medication 3 MILLILITER(S): at 14:16

## 2018-05-16 RX ADMIN — INSULIN GLARGINE 15 UNIT(S): 100 INJECTION, SOLUTION SUBCUTANEOUS at 21:43

## 2018-05-16 RX ADMIN — Medication 3 MILLILITER(S): at 21:06

## 2018-05-16 RX ADMIN — Medication 3 MILLILITER(S): at 10:44

## 2018-05-16 RX ADMIN — CARVEDILOL PHOSPHATE 12.5 MILLIGRAM(S): 80 CAPSULE, EXTENDED RELEASE ORAL at 18:27

## 2018-05-16 RX ADMIN — Medication 1: at 17:06

## 2018-05-16 NOTE — CONSULT NOTE ADULT - CONSULT REASON
Abscess
Adrenal Insufficiency
Tracheostomy Management: Assess for Decanulation
Upper airway eval
Vomiting
bradycardia
Back Abscess

## 2018-05-16 NOTE — CONSULT NOTE ADULT - SUBJECTIVE AND OBJECTIVE BOX
MILANA BALL 68y Female  MRN-[id number]    Patient is a 68y old  Female who presents with a chief complaint of Bradycardia (11 May 2018 23:46)      HPI:  67 y/o F w/ hx of DM2, HTN, breast CA (dx in 2/2017), w recent hospitalization for respiratory arrest 2/2 influenza, complicated by aspiration pna, PEA arrest x2 with ROS, PE s/p tPA, ARF requiring HD, s/p trach and PEG tube presents with bradycardia and shortness of breath. Pt was brought in from rehab after found to be bradycardic w/ HR in 30s. Hx obtain via pt and family at bedside. Pt c/o worsening shortness of breath and chest pain today. She has been taking carvedilol, but extended release diltiazem was recently added to her medication regimen after the daughter asked for pt to be taken off clonidine for hypertension.     In ED, pts vs were T100.8 /51 RR16 SaO2 97%. Labs were significant for hyperkalemia to 6.9 and blood glucose of 362. EKG showed bradycardia and junctional rhythm and peaked T waves. She was given atropine 0.5mg x2, 10U insulin +D50, calcium gluconate x4, NaHCO3 x1, and lasix 40mg IV. Her HR improved after temporizing measures and she was admitted to the MICU for bradycardia in the setting of hyperkalemia. (11 May 2018 23:46)    Pt admitted to MICU due to bradycardia/hyperkalemia issues    may 13 pt noted with back abscess and had I+D done    h/o MSSA bacteremia/PNA in March, s/p abx course     by bedside helped with hx    PAST MEDICAL & SURGICAL HISTORY:  Cardiac arrest  Stroke  Diabetes  Breast CA  H/O mastectomy, bilateral      Allergies    doxycycline (Rash)  isoniazid (Rash)  NIFEdipine (Urticaria; Hives)  vitamin E (Short breath; Urticaria; Hives)    Intolerances        ANTIMICROBIALS:  piperacillin/tazobactam IVPB. 3.375 every 8 hours      MEDICATIONS  (STANDING):  ALBUTerol/ipratropium for Nebulization 3 milliLiter(s) Nebulizer every 4 hours  aspirin  chewable 81 milliGRAM(s) Oral daily  BACItracin   Ointment 1 Application(s) Topical daily  carvedilol 12.5 milliGRAM(s) Oral every 12 hours  clotrimazole 1% Cream 1 Application(s) Topical two times a day  dextrose 5%. 1000 milliLiter(s) (50 mL/Hr) IV Continuous <Continuous>  dextrose 50% Injectable 12.5 Gram(s) IV Push once  dextrose 50% Injectable 25 Gram(s) IV Push once  dextrose 50% Injectable 25 Gram(s) IV Push once  heparin  Injectable 5000 Unit(s) SubCutaneous every 8 hours  insulin glargine Injectable (LANTUS) 15 Unit(s) SubCutaneous at bedtime  insulin lispro (HumaLOG) corrective regimen sliding scale   SubCutaneous three times a day before meals  insulin lispro Injectable (HumaLOG) 5 Unit(s) SubCutaneous three times a day with meals  piperacillin/tazobactam IVPB. 3.375 Gram(s) IV Intermittent every 8 hours  sodium chloride 0.9%. 1000 milliLiter(s) (75 mL/Hr) IV Continuous <Continuous>      Social History  Smoking: no  Etoh: no  Drug use: no      FAMILY HISTORY:  No pertinent family history in first degree relatives      Vital Signs Last 24 Hrs  T(C): 36.4 (16 May 2018 11:06), Max: 36.8 (15 May 2018 21:11)  T(F): 97.5 (16 May 2018 11:06), Max: 98.3 (16 May 2018 06:16)  HR: 85 (16 May 2018 11:06) (85 - 98)  BP: 139/78 (16 May 2018 11:06) (110/71 - 139/78)  BP(mean): --  RR: 17 (16 May 2018 11:06) (17 - 18)  SpO2: 100% (16 May 2018 11:06) (94% - 100%)    CBC Full  -  ( 16 May 2018 07:54 )  WBC Count : 8.68 K/uL  Hemoglobin : 11.8 g/dL  Hematocrit : 36.4 %  Platelet Count - Automated : 266 K/uL  Mean Cell Volume : 84.7 fl  Mean Cell Hemoglobin : 27.4 pg  Mean Cell Hemoglobin Concentration : 32.4 gm/dL  Auto Neutrophil # : x  Auto Lymphocyte # : x  Auto Monocyte # : x  Auto Eosinophil # : x  Auto Basophil # : x  Auto Neutrophil % : x  Auto Lymphocyte % : x  Auto Monocyte % : x  Auto Eosinophil % : x  Auto Basophil % : x    05-16    136  |  99  |  18  ----------------------------<  117<H>  4.8   |  24  |  1.15    Ca    9.3      16 May 2018 06:45  Phos  5.5     05-15  Mg     2.1     05-15    TPro  6.8  /  Alb  3.1<L>  /  TBili  0.2  /  DBili  x   /  AST  37  /  ALT  34  /  AlkPhos  244<H>  05-15    LIVER FUNCTIONS - ( 15 May 2018 07:00 )  Alb: 3.1 g/dL / Pro: 6.8 g/dL / ALK PHOS: 244 U/L / ALT: 34 U/L / AST: 37 U/L / GGT: x               MICROBIOLOGY:  .Abscess back  05-14-18   No growth  --  --      .Blood Blood-Venous  05-12-18   No growth to date.  --  --    RADIOLOGY    Xray Chest 1 View- PORTABLE-Urgent (05.14.18 @ 03:53) >  Clear lungs.

## 2018-05-16 NOTE — DIETITIAN INITIAL EVALUATION ADULT. - OTHER INFO
Nutrition consult received for tube feeding. Pt seen in bed consume mechanical soft breakfast. Pt appears with limited acceptance towards diet. Pt is vegan, will accept only very small amounts of milk. Although not Vegan, Pt willing to try Glucerna x1 daily to supplement PO intake. Pt was previously tolerating Glucerna 1.2 @ 40ml/[id number] via PEG at start of admission. Pt now s/p REG removal. Food preferences reviewed, and PO intake of small frequent meals/snacks was encouraged. Pt reports diarrhea yesterday, but reports resolved today since discontinuation of EN. Pt takes MVI PTA. NKFA

## 2018-05-16 NOTE — PROGRESS NOTE ADULT - SUBJECTIVE AND OBJECTIVE BOX
Patient is a 68y old  Female who presents with a chief complaint of Bradycardia (11 May 2018 23:46)      SUBJECTIVE / OVERNIGHT EVENTS:    Patient seen and examined. denies cp sob nvd. sp peg removal.      Vital Signs Last 24 Hrs  T(C): 36.4 (16 May 2018 11:06), Max: 36.8 (15 May 2018 21:11)  T(F): 97.5 (16 May 2018 11:06), Max: 98.3 (16 May 2018 06:16)  HR: 85 (16 May 2018 11:06) (85 - 98)  BP: 139/78 (16 May 2018 11:06) (110/71 - 139/78)  BP(mean): --  RR: 17 (16 May 2018 11:06) (17 - 18)  SpO2: 100% (16 May 2018 11:06) (94% - 100%)  I&O's Summary    15 May 2018 07:01  -  16 May 2018 07:00  --------------------------------------------------------  IN: 240 mL / OUT: 0 mL / NET: 240 mL    16 May 2018 07:01  -  16 May 2018 11:20  --------------------------------------------------------  IN: 180 mL / OUT: 0 mL / NET: 180 mL        PE:  GENERAL: NAD, AAOx3  HEAD:  Atraumatic, Normocephalic  EYES: EOMI, PERRLA, conjunctiva and sclera clear  NECK: Supple, No JVD, trach collar  CHEST/LUNG: CTABL, No wheeze  HEART: Regular rate and rhythm; no murmur  ABDOMEN: Soft, Nontender, Nondistended; Bowel sounds present  EXTREMITIES:  2+ Peripheral Pulses, No clubbing, cyanosis, or edema  SKIN: No rashes or lesions  NEURO: No focal deficits    LABS:                        11.8   8.68  )-----------( 266      ( 16 May 2018 07:54 )             36.4     05-16    136  |  99  |  18  ----------------------------<  117<H>  4.8   |  24  |  1.15    Ca    9.3      16 May 2018 06:45  Phos  5.5     05-15  Mg     2.1     05-15    TPro  6.8  /  Alb  3.1<L>  /  TBili  0.2  /  DBili  x   /  AST  37  /  ALT  34  /  AlkPhos  244<H>  05-15      CAPILLARY BLOOD GLUCOSE      POCT Blood Glucose.: 133 mg/dL (16 May 2018 07:56)  POCT Blood Glucose.: 157 mg/dL (15 May 2018 21:35)  POCT Blood Glucose.: 169 mg/dL (15 May 2018 17:27)  POCT Blood Glucose.: 119 mg/dL (15 May 2018 12:09)    CARDIAC MARKERS ( 14 May 2018 12:18 )  x     / 0.01 ng/mL / x     / x     / x              RADIOLOGY & ADDITIONAL TESTS:    Imaging Personally Reviewed:  [x] YES  [ ] NO    Consultant(s) Notes Reviewed:  [x] YES  [ ] NO    MEDICATIONS  (STANDING):  ALBUTerol/ipratropium for Nebulization 3 milliLiter(s) Nebulizer every 4 hours  aspirin  chewable 81 milliGRAM(s) Oral daily  BACItracin   Ointment 1 Application(s) Topical daily  carvedilol 12.5 milliGRAM(s) Oral every 12 hours  clotrimazole 1% Cream 1 Application(s) Topical two times a day  dextrose 5%. 1000 milliLiter(s) (50 mL/Hr) IV Continuous <Continuous>  dextrose 50% Injectable 12.5 Gram(s) IV Push once  dextrose 50% Injectable 25 Gram(s) IV Push once  dextrose 50% Injectable 25 Gram(s) IV Push once  heparin  Injectable 5000 Unit(s) SubCutaneous every 8 hours  insulin glargine Injectable (LANTUS) 15 Unit(s) SubCutaneous at bedtime  insulin lispro (HumaLOG) corrective regimen sliding scale   SubCutaneous three times a day before meals  piperacillin/tazobactam IVPB. 3.375 Gram(s) IV Intermittent every 8 hours  sodium chloride 0.9%. 1000 milliLiter(s) (75 mL/Hr) IV Continuous <Continuous>    MEDICATIONS  (PRN):  dextrose 40% Gel 15 Gram(s) Oral once PRN Blood Glucose LESS THAN 70 milliGRAM(s)/deciliter  glucagon  Injectable 1 milliGRAM(s) IntraMuscular once PRN Glucose LESS THAN 70 milligrams/deciliter  hydrALAZINE Injectable 10 milliGRAM(s) IV Push every 6 hours PRN SBP > 160      Care Discussed with Consultants/Other Providers [x] YES  [ ] NO    HEALTH ISSUES - PROBLEM Dx:  Hypertension, unspecified type: Hypertension, unspecified type  Type 2 diabetes mellitus with hyperglycemia, with long-term current use of insulin: Type 2 diabetes mellitus with hyperglycemia, with long-term current use of insulin  Hyperkalemia: Hyperkalemia

## 2018-05-16 NOTE — CONSULT NOTE ADULT - PROBLEM SELECTOR RECOMMENDATION 9
-incision clean  -cx negative  -cont local wound care  -DC abx -incision clean  -cx negative  -cont local wound care  -no cellulitis at this time  -DC abx and observe

## 2018-05-16 NOTE — CONSULT NOTE ADULT - SUBJECTIVE AND OBJECTIVE BOX
PULMONARY CONSULT  Caleb Mon MD  [phone]    Initial HPI on admission:  HPI:  67 y/o F w/ hx of DM2, HTN, breast CA (dx in 2/2017), w recent hospitalization for respiratory arrest 2/2 influenza, complicated by aspiration pna, PEA arrest x2 with ROS, PE s/p tPA, ARF requiring HD, s/p trach and PEG tube presents with bradycardia and shortness of breath. Pt was brought in from rehab after found to be bradycardic w/ HR in 30s. Hx obtain via pt and family at bedside. Pt c/o worsening shortness of breath and chest pain today. She has been taking carvedilol, but extended release diltiazem was recently added to her medication regimen after the daughter asked for pt to be taken off clonidine for hypertension.     In ED, pts vs were T100.8 /51 RR16 SaO2 97%. Labs were significant for hyperkalemia to 6.9 and blood glucose of 362. EKG showed bradycardia and junctional rhythm and peaked T waves. She was given atropine 0.5mg x2, 10U insulin +D50, calcium gluconate x4, NaHCO3 x1, and lasix 40mg IV. Her HR improved after temporizing measures and she was admitted to the MICU for bradycardia in the setting of hyperkalemia. (11 May 2018 23:46)    History obtained from . Patient is currently in [facility] in [facility]. Per , she has resumed full p.o. diet - PEG was left in place as backup: was removed today on floor. SHe uses speaking valve and currently has a Portex #6 tracheostomy.  states she is generally alert, conversant, with intact mental status. She has been ambulating with assistance in rehab. He denies re ent history of fever, chills, couhg, dyspnea. He initiated ER admission for asymptomtic bradycardia in the 30's. He states she has had a stable respiratory status in rehab. She was followed by Dr Campuzano (oncology) at [facility]  - has not been seen since recent admission and transfer to rehab. She is currently not on chemotherapy.    PAST MEDICAL & SURGICAL HISTORY:  Cardiac arrest  Stroke  Diabetes  Breast CA  H/O mastectomy, bilateral    Allergies    doxycycline (Rash)  isoniazid (Rash)  NIFEdipine (Urticaria; Hives)  vitamin E (Short breath; Urticaria; Hives)    Intolerances      FAMILY HISTORY:  No pertinent family history in first degree relatives    Social history:   Review of Systems: REVIEW OF SYSTEMS:   CONSTITUTIONAL: No weakness, fevers or chills  EYES/ENT: No visual changes;  No vertigo or throat pain   NECK: No pain or stiffness  RESPIRATORY: +SOB; No cough, wheezing, hemoptysis  CARDIOVASCULAR: +chest pain, No palpitations  GASTROINTESTINAL: No abdominal or epigastric pain. No nausea, vomiting, or hematemesis; No diarrhea or constipation. No melena or hematochezia.  GENITOURINARY: No dysuria, frequency or hematuria  NEUROLOGICAL: No numbness or weakness  SKIN: No itching, burning, rashes, or lesions  All other review of systems is negative unless indicated above.	      Allergies and Intolerances:        Allergies:  	vitamin E: Drug, Short breath, Urticaria, Hives  	NIFEdipine: Drug, Urticaria, Hives  	doxycycline: Drug, Rash  	isoniazid: Drug, Rash    Medications:  MEDICATIONS  (STANDING):  ALBUTerol/ipratropium for Nebulization 3 milliLiter(s) Nebulizer every 4 hours  aspirin  chewable 81 milliGRAM(s) Oral daily  BACItracin   Ointment 1 Application(s) Topical daily  carvedilol 12.5 milliGRAM(s) Oral every 12 hours  clotrimazole 1% Cream 1 Application(s) Topical two times a day  dextrose 5%. 1000 milliLiter(s) (50 mL/Hr) IV Continuous <Continuous>  dextrose 50% Injectable 12.5 Gram(s) IV Push once  dextrose 50% Injectable 25 Gram(s) IV Push once  dextrose 50% Injectable 25 Gram(s) IV Push once  heparin  Injectable 5000 Unit(s) SubCutaneous every 8 hours  insulin glargine Injectable (LANTUS) 15 Unit(s) SubCutaneous at bedtime  insulin lispro (HumaLOG) corrective regimen sliding scale   SubCutaneous three times a day before meals  piperacillin/tazobactam IVPB. 3.375 Gram(s) IV Intermittent every 8 hours  sodium chloride 0.9%. 1000 milliLiter(s) (75 mL/Hr) IV Continuous <Continuous>    MEDICATIONS  (PRN):  dextrose 40% Gel 15 Gram(s) Oral once PRN Blood Glucose LESS THAN 70 milliGRAM(s)/deciliter  glucagon  Injectable 1 milliGRAM(s) IntraMuscular once PRN Glucose LESS THAN 70 milligrams/deciliter  hydrALAZINE Injectable 10 milliGRAM(s) IV Push every 6 hours PRN SBP > 160    Vital Signs Last 24 Hrs  T(C): 36.4 (16 May 2018 11:06), Max: 36.8 (15 May 2018 21:11)  T(F): 97.5 (16 May 2018 11:06), Max: 98.3 (16 May 2018 06:16)  HR: 85 (16 May 2018 11:06) (85 - 98)  BP: 139/78 (16 May 2018 11:06) (110/71 - 139/78)  BP(mean): --  RR: 17 (16 May 2018 11:06) (17 - 18)  SpO2: 100% (16 May 2018 11:06) (94% - 100%)    05-15 @ 07:01  -  05-16 @ 07:00  --------------------------------------------------------  IN: 240 mL / OUT: 0 mL / NET: 240 mL      LABS:                        11.8   8.68  )-----------( 266      ( 16 May 2018 07:54 )             36.4     05-16    136  |  99  |  18  ----------------------------<  117<H>  4.8   |  24  |  1.15    Ca    9.3      16 May 2018 06:45  Phos  5.5     05-15  Mg     2.1     05-15    TPro  6.8  /  Alb  3.1<L>  /  TBili  0.2  /  DBili  x   /  AST  37  /  ALT  34  /  AlkPhos  244<H>  05-15                CULTURES:  Culture Results:   No growth (05-14 @ 00:41)  Culture Results:   No growth to date. (05-12 @ 05:40)  Culture Results:   No growth to date. (05-12 @ 05:40)    Physical Examination:    General: Sleeping, rousable and verbally appropriate - flat affect. No tachypnea, No acute distress.      HEENT: Pupils equal, reactive to light.  Symmetric.    PULM: Clear wihtout wheeze or rhonchi    CVS: Regular rate and rhythm, no murmurs, rubs, or gallops    ABD: Soft, nondistended, nontender, normoactive bowel sounds, no masses    EXT: No edema, nontender    SKIN: Warm and well perfused, no rashes noted.    NEURO: Alert, oriented, interactive, nonfocal    RADIOLOGY REVIEWED PERSONALLY  CXR: MARISOL    CT chest:    TTE: nl LVEF, RV; PA ,40

## 2018-05-16 NOTE — CONSULT NOTE ADULT - ASSESSMENT
y/o F w/ hx of DM2, HTN, breast CA (dx in 2/2017), w recent hospitalization for respiratory arrest 2/2 influenza, complicated by aspiration pna, PEA arrest x2 with ROS, PE s/p tPA, ARF requiring HD, s/p trach and PEG tube presents with bradycardia and shortness of breath with back abscess, s/p I+D

## 2018-05-16 NOTE — PROGRESS NOTE ADULT - ASSESSMENT
68 yr F with recent hospitalization for respiratory failure, s/p PEA arrest now with Trach and PEG and hx of T2DM c/b neuropathy, CVA and CKD III here with hyperkalemia and bradycardia. BG values elevated today as diet has been advanced. Will restart standing premeal. BG goal (100-180mg/dl). Insulin requirements will need to be assessed over the next 24 hours as patient is on higher doses at home.

## 2018-05-16 NOTE — DIETITIAN INITIAL EVALUATION ADULT. - ORAL INTAKE PTA
reports a fair to good PO intake while in rehab. Breakfast: cereal, milk, bread. Lunch and Dinner: (provided from home) cream of rice, vegetables. Drinks water and gingerale/fair

## 2018-05-16 NOTE — CONSULT NOTE ADULT - ASSESSMENT
69 y/o F w/ hx of DM2, HTN, breast CA (dx in 2/2017), complicated by respiratory failure S/P trach, tolerating speaking valve and a soft diet. Bedside indirect laryngoscopy showed adequate, but poor glottic excursion and no evidence of tracheal stenosis on tracheoscopy. OK to decannulate from an ENT standpoint.

## 2018-05-16 NOTE — DIETITIAN INITIAL EVALUATION ADULT. - ADHERENCE
Pt followed a mechanical soft diet with thin liquids PTA and Proform supplement x1 daily. Of note Pt is Vegan/good

## 2018-05-16 NOTE — PROGRESS NOTE ADULT - ASSESSMENT
69 y/o F w/ hx of DM2, HTN, breast CA (dx in 2/2017), w recent hospitalization for respiratory arrest 2/2 influenza, complicated by aspiration pna, PEA arrest x2 with ROS, PE s/p tPA, ARF requiring HD, s/p trach and PEG tube presents with bradycardia and EKG concerning for arrythmia 2/2 medication side effect vs hyperkalemia.       # bradycardia, junctional rhythm and peaked T waves  - resolved, cont. Now resolved. Concern for juanita agent toxicity (pt on both beta blockers and CCBs) in setting of CKD and hyperkalemia  - hydralazine 10mg IV Q6H PRN for SBP > 160.    # s/p tracheostomy for chronic hypoxemic respiratory failure  - continue with trach collar  - duonebs Q6H  - pulm consult     # Back abcess s/p I&Ded by micu team  - ID consult Dr. Durand  - cont zosyn, s/p 1 dose vanco, f/u cx/s    # DM  - appreciate endo recs    # RUSS on CKD resolved  - Strict I&Os,   - renally dose all meds, avoid nephrotoxins     # PEG tube removal  - appreciate GI recs    # PPX  - heparin subQ    plan of care dw patient and  at bedside

## 2018-05-16 NOTE — CONSULT NOTE ADULT - SUBJECTIVE AND OBJECTIVE BOX
Patient previously followed by Dr. FAHAD Durand in Feb-March of this year.  Left messages for Dr. Palmer and Dr. Kizzy Beck MD  [phone]

## 2018-05-16 NOTE — CONSULT NOTE ADULT - ATTENDING COMMENTS
Valdemar Dash  Attending Physician   Division of Infectious Disease  Pager #[phone]  After 5pm/weekend or no response, call #[phone]
Pt seen and examined. Agree with note as above with addendum: pt had a low Na+ and high K+ on admission but this resolved without steroids, so AI was an unlikely cause. Her glycemic regimen should be adjusted as above due to her cessation of TF and initiation of mechanical soft diet.

## 2018-05-16 NOTE — DIETITIAN INITIAL EVALUATION ADULT. - NS AS NUTRI INTERV FEED ASSISTANCE
Other (specify)/Menu selection assistance/1. Provide food preferences as requested by Pt/family within diet restrictions  2. Encourage PO intake during meal times 3. Reviewed menu ordering procedures.. RD remains available to monitor PO intake, wt, labs and diet education review.

## 2018-05-16 NOTE — CONSULT NOTE ADULT - CONSULT REQUESTED DATE/TIME
13-May-2018 09:01
15-May-2018 16:18
15-May-2018 16:40
16-May-2018
16-May-2018 10:49
16-May-2018 12:39
16-May-2018 13:58

## 2018-05-16 NOTE — DIETITIAN INITIAL EVALUATION ADULT. - ENERGY NEEDS
Ht: 4'11", Wt: 144.6lbs, BMI: 29.2kg/m2, IBW: 98lbs(+/-10%), 146%IBW  Pertinent information: Pt admitted from rehab for bradycardiac and EKG changes likely  due to medication changes vs hyperkalemia. Pt with recent admission fro respiratory failure, influenza, Aspiration PNA, requiring PEG and trach.  Pt now s/p bedside PEG removal and s/p MBS recommending mechanical soft diet.   No Edema, Skin intact

## 2018-05-16 NOTE — CONSULT NOTE ADULT - ASSESSMENT
Breast CA - currently not on chemotherapy  s/p PEA arrest in 1/2018 with subsequent RUSS, respiraory failure, s/p PEG and tracheostomy  Cerebral infarct  Patient was febrile on admission with temp 100.8, leuukocytosis, and was treated with empiric abx - WBC now normal, cultures neg to date, and CXR clear , without overt consolidation. Bradycardia was attributed to medications - she is currently in NSR in the 80's. She appears to have reasonable mental status, has tolerated speaking valve during day for weeks, with stable respiratory status. She is potentially candidate for decannulation during this admission; however, would like to observe for secretions, mental status, and have ID evaluation re initial fever. Would consult ENT for inspection of trachea for evidence of tracheal stensosis.  Could initiate capping trials with plans for decannulation in  2-3 days per clinical status.

## 2018-05-16 NOTE — PROGRESS NOTE ADULT - SUBJECTIVE AND OBJECTIVE BOX
CARDIOLOGY FOLLOW UP NOTE - DR. DAMION ROSENBERG    Patient states no new complaints.    MEDICATIONS  (STANDING):  ALBUTerol/ipratropium for Nebulization 3 milliLiter(s) Nebulizer every 4 hours  aspirin  chewable 81 milliGRAM(s) Oral daily  BACItracin   Ointment 1 Application(s) Topical daily  carvedilol 12.5 milliGRAM(s) Oral every 12 hours  clotrimazole 1% Cream 1 Application(s) Topical two times a day  dextrose 5%. 1000 milliLiter(s) (50 mL/Hr) IV Continuous <Continuous>  dextrose 50% Injectable 12.5 Gram(s) IV Push once  dextrose 50% Injectable 25 Gram(s) IV Push once  dextrose 50% Injectable 25 Gram(s) IV Push once  heparin  Injectable 5000 Unit(s) SubCutaneous every 8 hours  insulin glargine Injectable (LANTUS) 15 Unit(s) SubCutaneous at bedtime  insulin lispro (HumaLOG) corrective regimen sliding scale   SubCutaneous at bedtime  insulin lispro (HumaLOG) corrective regimen sliding scale   SubCutaneous three times a day before meals  insulin lispro Injectable (HumaLOG) 5 Unit(s) SubCutaneous three times a day with meals  sodium chloride 0.9%. 1000 milliLiter(s) (75 mL/Hr) IV Continuous <Continuous>        PHYSICAL EXAM:  Vital Signs Last 24 Hrs  T(C): 36.4 (16 May 2018 11:06), Max: 36.8 (15 May 2018 21:11)  T(F): 97.5 (16 May 2018 11:06), Max: 98.3 (16 May 2018 06:16)  HR: 82 (16 May 2018 17:41) (82 - 98)  BP: 181/72 (16 May 2018 17:41) (129/79 - 181/72)  BP(mean): --  RR: 17 (16 May 2018 11:06) (17 - 18)  SpO2: 100% (16 May 2018 11:06) (100% - 100%)  I&O's Summary    15 May 2018 07:01  -  16 May 2018 07:00  --------------------------------------------------------  IN: 240 mL / OUT: 0 mL / NET: 240 mL    16 May 2018 07:01  -  16 May 2018 17:51  --------------------------------------------------------  IN: 300 mL / OUT: 0 mL / NET: 300 mL        Telemetry:  sinus 70's-80's    General: NAD	  HEENT:   No JVD, + trach	  Cardiovascular: RRR  Extremities: No edema    	    LABS:                          11.8   8.68  )-----------( 266      ( 16 May 2018 07:54 )             36.4     05-16    136  |  99  |  18  ----------------------------<  117<H>  4.8   |  24  |  1.15    Ca    9.3      16 May 2018 06:45  Phos  5.5     05-15  Mg     2.1     05-15    TPro  6.8  /  Alb  3.1<L>  /  TBili  0.2  /  DBili  x   /  AST  37  /  ALT  34  /  AlkPhos  244<H>  05-15  HgA1c: Hemoglobin A1C, Whole Blood: 7.4 % (05-16 @ 07:54)        HEALTH ISSUES - PROBLEM Dx:  Back abscess: Back abscess  Hypertension, unspecified type: Hypertension, unspecified type  Type 2 diabetes mellitus with hyperglycemia, with long-term current use of insulin: Type 2 diabetes mellitus with hyperglycemia, with long-term current use of insulin  Hyperkalemia: Hyperkalemia        Assessment and Plan:  69 y/o F w/ hx of DM2, HTN, breast CA (dx in 2/2017), with recent hospitalization for respiratory arrest 2/2 influenza, complicated by aspiration PNA, PEA arrest x2, PE s/p tPA, ARF requiring temporary HD, s/p trach and PEG tube, who presents with bradycardia  1. Bradycardia - pt had a sinus arrest with a junctional escape rhythm, in the setting of hyperkalemia and two AV juanita blockers. I suspect that the hyperkalemia played a larger role than the combination of carvedilol and cardizem, especially since she does not have other evidence of significant conduction disease on her ECG. Unclear etiology of hyperkalemia (maybe due to ramipril), although she did present with an RUSS as well, with elevated creatinine, which has already resolved (although the increased creatinine could also have been due to poor renal perfusion in the setting of significant bradycardia). As soon as hyperkalemia was treated, her sinus node kicked-in. HR is now in the 70's-80's, and BP is good. Continue carvedilol at 12.5mg bid and will continue to follow on telemetry.  2. HTN - overall controlled; will continue carvedilol 12.5mg bid. Pt was on Ramipril in the past as well. Unclear etiology of hyperkalemia. It is unlikely that it was due to mildly increased creatinine, which very quickly resolved with return of sinus rhythm. Perhaps rampiril played a role. Will therefore, try to hold off on ACE-I. If needed for BP, will start oral hydralazine.  3. CP - cardiac enzymes and ECG were unremarkable. No recurrence. Will follow.

## 2018-05-16 NOTE — DIETITIAN INITIAL EVALUATION ADULT. - FACTORS AFF FOOD INTAKE
Pt with history of PEG, Pt reports being Vegan/difficulty swallowing/Hinduism/ethnic/cultural/personal food preferences

## 2018-05-16 NOTE — DIETITIAN INITIAL EVALUATION ADULT. - NS FNS REASON FOR WEIGHT CHANG
states Pt's last reported weight was 153lbs and denies Pt to have lost weight, states Pt visually appears the same. Pt is currently 144lbs, although noted bed scale weights appear to have fluctuations

## 2018-05-16 NOTE — CONSULT NOTE ADULT - SUBJECTIVE AND OBJECTIVE BOX
CC: Upper airway eval    HPI: 67 y/o F w/ hx of DM2, HTN, breast CA (dx in 2/2017), w recent hospitalization for respiratory arrest 2/2 influenza, complicated by aspiration pna, PEA arrest x2 with ROS, PE s/p tPA, ARF requiring HD, s/p trach and PEG tube presents with bradycardia and shortness of breath. ENT was called to see pt for upper airway eval for possible decannulation. As per daughter, Pt's trach was downsized to a #6 portex uncuffed and fitted with a speaking valve. Pt also had an MBS and was placed on a soft diet. Pt is phonating well. Pt denies SOB, sore throat, fevers.       PAST MEDICAL & SURGICAL HISTORY:  Cardiac arrest  Stroke  Diabetes  Breast CA  H/O mastectomy, bilateral    Allergies    doxycycline (Rash)  isoniazid (Rash)  NIFEdipine (Urticaria; Hives)  vitamin E (Short breath; Urticaria; Hives)    Intolerances      MEDICATIONS  (STANDING):  ALBUTerol/ipratropium for Nebulization 3 milliLiter(s) Nebulizer every 4 hours  aspirin  chewable 81 milliGRAM(s) Oral daily  BACItracin   Ointment 1 Application(s) Topical daily  carvedilol 12.5 milliGRAM(s) Oral every 12 hours  clotrimazole 1% Cream 1 Application(s) Topical two times a day  dextrose 5%. 1000 milliLiter(s) (50 mL/Hr) IV Continuous <Continuous>  dextrose 50% Injectable 12.5 Gram(s) IV Push once  dextrose 50% Injectable 25 Gram(s) IV Push once  dextrose 50% Injectable 25 Gram(s) IV Push once  heparin  Injectable 5000 Unit(s) SubCutaneous every 8 hours  insulin glargine Injectable (LANTUS) 15 Unit(s) SubCutaneous at bedtime  insulin lispro (HumaLOG) corrective regimen sliding scale   SubCutaneous at bedtime  insulin lispro (HumaLOG) corrective regimen sliding scale   SubCutaneous three times a day before meals  insulin lispro Injectable (HumaLOG) 5 Unit(s) SubCutaneous three times a day with meals  sodium chloride 0.9%. 1000 milliLiter(s) (75 mL/Hr) IV Continuous <Continuous>    MEDICATIONS  (PRN):  dextrose 40% Gel 15 Gram(s) Oral once PRN Blood Glucose LESS THAN 70 milliGRAM(s)/deciliter  glucagon  Injectable 1 milliGRAM(s) IntraMuscular once PRN Glucose LESS THAN 70 milligrams/deciliter  hydrALAZINE Injectable 10 milliGRAM(s) IV Push every 6 hours PRN SBP > 160    Social History: no tobacco use    ROS: ENT, GI, , CV, Pulm, Neuro, Psych, MS, Heme, Endo, Constitutional; all negative except as noted in HPI    Vital Signs Last 24 Hrs  T(C): 36.4 (16 May 2018 11:06), Max: 36.8 (15 May 2018 21:11)  T(F): 97.5 (16 May 2018 11:06), Max: 98.3 (16 May 2018 06:16)  HR: 82 (16 May 2018 17:41) (82 - 98)  BP: 181/72 (16 May 2018 17:41) (129/79 - 181/72)  BP(mean): --  RR: 17 (16 May 2018 11:06) (17 - 18)  SpO2: 100% (16 May 2018 11:06) (100% - 100%)                          11.8   8.68  )-----------( 266      ( 16 May 2018 07:54 )             36.4    05-16    136  |  99  |  18  ----------------------------<  117<H>  4.8   |  24  |  1.15    Ca    9.3      16 May 2018 06:45  Phos  5.5     05-15  Mg     2.1     05-15    TPro  6.8  /  Alb  3.1<L>  /  TBili  0.2  /  DBili  x   /  AST  37  /  ALT  34  /  AlkPhos  244<H>  05-15       PHYSICAL EXAM:  Gen: NAD, well-developed  Head: Normocephalic, Atraumatic  Face: no edema/erythema/fluctuance, parotid glands soft without mass  Eyes: PERRL, EOMI, no scleral injection  Nose: Nares bilaterally patent, no discharge  Mouth: Mucosa moist, tongue/uvula midline, oropharynx clear  Neck: #6 portex uncuffed in place, secured with a velcro tie, tolerating PMV, ventilating well, no bleeding, erythema or purulence around the stoma, Flat, supple, no lymphadenopathy, trachea midline, no masses  Resp: no use of accessory muscles, no stridor  CV: no peripheral edema/cyanosis  FOE/Laryngoscopy- scope #1- No turbinate hypertrophy, no pus or polyps in nasal cavity, No bleeding in nasaopharynx or Oral pharynx.  No foreign body or pooling of secretions.  No glottic / supraglottic swelling.  Vocal cords mobile in intact b/l.  Airway patent.

## 2018-05-16 NOTE — PROGRESS NOTE ADULT - SUBJECTIVE AND OBJECTIVE BOX
Diabetes Follow up note:  Interval Hx:  68 year old female w/T2DM w/neuropathy, CVA, CKD 3 w recent hospitalization for respiratory arrest 2/2 influenza, complicated by aspiration pna, PEA arrest x2 with ROS, PE s/p tPA, ARF requiring HD, s/p trach and PEG tube presents with bradycardia and shortness of breath. Pt w/diet advanced to mechanical soft today. BG value at lunch 286 after eating breakfast in AM. Pt seen at bedside w/ present assisting w/lunch. Pt w/good appetite-eating bread, soup and apple sauce.       Review of Systems:  General: No complaints.   GI: Tolerating POs without any N/V/D/ABD PAIN.  CV: No CP/SOB  ENDO: No S&Sx of hypoglycemia  MEDS:    insulin glargine Injectable (LANTUS) 15 Unit(s) SubCutaneous at bedtime  insulin lispro (HumaLOG) corrective regimen sliding scale   SubCutaneous three times a day before meals    piperacillin/tazobactam IVPB. 3.375 Gram(s) IV Intermittent every 8 hours    Allergies    doxycycline (Rash)  isoniazid (Rash)  NIFEdipine (Urticaria; Hives)  vitamin E (Short breath; Urticaria; Hives)          PE:  General: Female lying in bed. NAD.   Vital Signs Last 24 Hrs  T(C): 36.4 (16 May 2018 11:06), Max: 36.8 (15 May 2018 21:11)  T(F): 97.5 (16 May 2018 11:06), Max: 98.3 (16 May 2018 06:16)  HR: 85 (16 May 2018 11:06) (85 - 98)  BP: 139/78 (16 May 2018 11:06) (110/71 - 139/78)  BP(mean): --  RR: 17 (16 May 2018 11:06) (17 - 18)  SpO2: 100% (16 May 2018 11:06) (94% - 100%)  HEENT: Trach in place.   Abd: Soft, NT,ND, + PEG  Extremities: Warm  Neuro: Awake and appropriate to situation.   LABS:    POCT Blood Glucose.: 286 mg/dL (05-16-18 @ 11:55)  POCT Blood Glucose.: 133 mg/dL (05-16-18 @ 07:56)  POCT Blood Glucose.: 157 mg/dL (05-15-18 @ 21:35)  POCT Blood Glucose.: 169 mg/dL (05-15-18 @ 17:27)  POCT Blood Glucose.: 119 mg/dL (05-15-18 @ 12:09)  POCT Blood Glucose.: 164 mg/dL (05-15-18 @ 06:38)  POCT Blood Glucose.: 122 mg/dL (05-14-18 @ 23:41)  POCT Blood Glucose.: 110 mg/dL (05-14-18 @ 17:42)  POCT Blood Glucose.: 124 mg/dL (05-14-18 @ 12:08)  POCT Blood Glucose.: 164 mg/dL (05-14-18 @ 05:55)  POCT Blood Glucose.: 142 mg/dL (05-13-18 @ 23:44)  POCT Blood Glucose.: 144 mg/dL (05-13-18 @ 18:40)                            11.8   8.68  )-----------( 266      ( 16 May 2018 07:54 )             36.4       05-16    136  |  99  |  18  ----------------------------<  117<H>  4.8   |  24  |  1.15    Ca    9.3      16 May 2018 06:45  Phos  5.5     05-15  Mg     2.1     05-15    TPro  6.8  /  Alb  3.1<L>  /  TBili  0.2  /  DBili  x   /  AST  37  /  ALT  34  /  AlkPhos  244<H>  05-15      Thyroid Function Tests:  05-11 @ 22:51 TSH 1.11 FreeT4 -- T3 -- Anti TPO -- Anti Thyroglobulin Ab -- TSI --      Hemoglobin A1C, Whole Blood: 7.4 % <H> [4.0 - 5.6] (05-16-18 @ 07:54)            Contact number: marcelino [phone] or [phone]

## 2018-05-17 LAB
ANION GAP SERPL CALC-SCNC: 10 MMOL/L — SIGNIFICANT CHANGE UP (ref 5–17)
BUN SERPL-MCNC: 14 MG/DL — SIGNIFICANT CHANGE UP (ref 7–23)
CALCIUM SERPL-MCNC: 9.5 MG/DL — SIGNIFICANT CHANGE UP (ref 8.4–10.5)
CHLORIDE SERPL-SCNC: 101 MMOL/L — SIGNIFICANT CHANGE UP (ref 96–108)
CO2 SERPL-SCNC: 23 MMOL/L — SIGNIFICANT CHANGE UP (ref 22–31)
CREAT SERPL-MCNC: 0.85 MG/DL — SIGNIFICANT CHANGE UP (ref 0.5–1.3)
CULTURE RESULTS: SIGNIFICANT CHANGE UP
CULTURE RESULTS: SIGNIFICANT CHANGE UP
GLUCOSE BLDC GLUCOMTR-MCNC: 137 MG/DL — HIGH (ref 70–99)
GLUCOSE BLDC GLUCOMTR-MCNC: 158 MG/DL — HIGH (ref 70–99)
GLUCOSE BLDC GLUCOMTR-MCNC: 170 MG/DL — HIGH (ref 70–99)
GLUCOSE BLDC GLUCOMTR-MCNC: 179 MG/DL — HIGH (ref 70–99)
GLUCOSE BLDC GLUCOMTR-MCNC: 210 MG/DL — HIGH (ref 70–99)
GLUCOSE SERPL-MCNC: 104 MG/DL — HIGH (ref 70–99)
HCT VFR BLD CALC: 37.2 % — SIGNIFICANT CHANGE UP (ref 34.5–45)
HGB BLD-MCNC: 11.9 G/DL — SIGNIFICANT CHANGE UP (ref 11.5–15.5)
MCHC RBC-ENTMCNC: 27.2 PG — SIGNIFICANT CHANGE UP (ref 27–34)
MCHC RBC-ENTMCNC: 32 GM/DL — SIGNIFICANT CHANGE UP (ref 32–36)
MCV RBC AUTO: 84.9 FL — SIGNIFICANT CHANGE UP (ref 80–100)
PLATELET # BLD AUTO: 265 K/UL — SIGNIFICANT CHANGE UP (ref 150–400)
POTASSIUM SERPL-MCNC: 4.7 MMOL/L — SIGNIFICANT CHANGE UP (ref 3.5–5.3)
POTASSIUM SERPL-SCNC: 4.7 MMOL/L — SIGNIFICANT CHANGE UP (ref 3.5–5.3)
RBC # BLD: 4.38 M/UL — SIGNIFICANT CHANGE UP (ref 3.8–5.2)
RBC # FLD: 15.3 % — HIGH (ref 10.3–14.5)
SODIUM SERPL-SCNC: 134 MMOL/L — LOW (ref 135–145)
SPECIMEN SOURCE: SIGNIFICANT CHANGE UP
SPECIMEN SOURCE: SIGNIFICANT CHANGE UP
WBC # BLD: 4.91 K/UL — SIGNIFICANT CHANGE UP (ref 3.8–10.5)
WBC # FLD AUTO: 4.91 K/UL — SIGNIFICANT CHANGE UP (ref 3.8–10.5)

## 2018-05-17 PROCEDURE — 99232 SBSQ HOSP IP/OBS MODERATE 35: CPT

## 2018-05-17 RX ORDER — ALTEPLASE 100 MG
2 KIT INTRAVENOUS ONCE
Qty: 0 | Refills: 0 | Status: DISCONTINUED | OUTPATIENT
Start: 2018-05-17 | End: 2018-05-17

## 2018-05-17 RX ORDER — HYDRALAZINE HCL 50 MG
25 TABLET ORAL EVERY 12 HOURS
Qty: 0 | Refills: 0 | Status: DISCONTINUED | OUTPATIENT
Start: 2018-05-17 | End: 2018-05-18

## 2018-05-17 RX ADMIN — Medication 1 APPLICATION(S): at 08:08

## 2018-05-17 RX ADMIN — HEPARIN SODIUM 5000 UNIT(S): 5000 INJECTION INTRAVENOUS; SUBCUTANEOUS at 05:42

## 2018-05-17 RX ADMIN — Medication 5 UNIT(S): at 12:12

## 2018-05-17 RX ADMIN — Medication 1: at 17:53

## 2018-05-17 RX ADMIN — Medication 25 MILLIGRAM(S): at 17:01

## 2018-05-17 RX ADMIN — Medication 1: at 12:12

## 2018-05-17 RX ADMIN — Medication 3 MILLILITER(S): at 17:01

## 2018-05-17 RX ADMIN — Medication 5 UNIT(S): at 17:53

## 2018-05-17 RX ADMIN — Medication 3 MILLILITER(S): at 05:37

## 2018-05-17 RX ADMIN — CARVEDILOL PHOSPHATE 12.5 MILLIGRAM(S): 80 CAPSULE, EXTENDED RELEASE ORAL at 05:37

## 2018-05-17 RX ADMIN — Medication 3 MILLILITER(S): at 13:07

## 2018-05-17 RX ADMIN — Medication 1 APPLICATION(S): at 11:05

## 2018-05-17 RX ADMIN — Medication 3 MILLILITER(S): at 02:50

## 2018-05-17 RX ADMIN — INSULIN GLARGINE 15 UNIT(S): 100 INJECTION, SOLUTION SUBCUTANEOUS at 21:36

## 2018-05-17 RX ADMIN — CARVEDILOL PHOSPHATE 12.5 MILLIGRAM(S): 80 CAPSULE, EXTENDED RELEASE ORAL at 17:01

## 2018-05-17 RX ADMIN — Medication 3 MILLILITER(S): at 09:33

## 2018-05-17 RX ADMIN — Medication 1 APPLICATION(S): at 21:26

## 2018-05-17 RX ADMIN — Medication 81 MILLIGRAM(S): at 11:05

## 2018-05-17 RX ADMIN — HEPARIN SODIUM 5000 UNIT(S): 5000 INJECTION INTRAVENOUS; SUBCUTANEOUS at 21:36

## 2018-05-17 RX ADMIN — Medication 3 MILLILITER(S): at 21:27

## 2018-05-17 RX ADMIN — Medication 5 UNIT(S): at 08:08

## 2018-05-17 RX ADMIN — HEPARIN SODIUM 5000 UNIT(S): 5000 INJECTION INTRAVENOUS; SUBCUTANEOUS at 13:07

## 2018-05-17 NOTE — PROGRESS NOTE ADULT - ASSESSMENT
68 yr F with recent hospitalization for respiratory failure, s/p PEA arrest now with Trach and PEG and hx of T2DM c/b neuropathy, CVA and CKD III here with hyperkalemia and bradycardia. Tolerating POs, now off tube feeds on basal/bolus w/glycemic control. BG goal (100-180mg/dl).

## 2018-05-17 NOTE — CHART NOTE - NSCHARTNOTEFT_GEN_A_CORE
[id number]  MILANA BALL        Plan of Care/ Interventions: [id number]  MILANA BALL  Patient's daughter concerned with rash on patient's abdomen, thighs, and bilateral arms that was noticed today.  Rash is not pruritic.     Physical exam:  Skin:       Plan of Care/ Interventions: [id number]  MILANA BALL  Patient's daughter concerned with rash on patient's abdomen, thighs, and bilateral arms that was noticed today.  Rash is not pruritic.   Of note, patient was receiving diltiazem earlier in this admission. This medication was d/c'ed secondary to bradycardia.   In the past, the patient has had a rash in response to Nifedipine.    Physical exam:  Skin: (+) fine pink maculopapular rash on abdomen, upper thighs, and bilateral arms. (+) blanching with pressure      Rash Plan of Care/ Interventions:  ?rash secondary to Diltiazem vs reaction to other drug vs xerosis cutis  No other associated symptoms, rash is not pruritic  Recommend use of emollients to skin  Monitor rash for worsening  Consider hydrocortisone cream if symptomatic  Will endorse to primary team in AM      Milagros Montoya PA-C  [id number]

## 2018-05-17 NOTE — PROGRESS NOTE ADULT - ASSESSMENT
Breast CA  s/p respiratory failure with tracheostomy - now clinically stable and candidate for decannulation if capping trial is successful  CKD  s/p watershed CNS infarct  Bradycardia    REC    Capping trial today: DC overnight and resume in AM  Plan for decannulation tomorrow if AM capping trial tolerated  Check sats on RA - may need nasal oxygen while capped  Observe off antibiotics

## 2018-05-17 NOTE — PROGRESS NOTE ADULT - ASSESSMENT
67 y/o F w/ hx of DM2, HTN, breast CA (dx in 2/2017), complicated by respiratory failure S/P trach, tolerating speaking valve and a soft diet. Bedside indirect laryngoscopy showed adequate, but poor glottic excursion and no evidence of tracheal stenosis on tracheoscopy. OK to decannulate from an ENT standpoint.

## 2018-05-17 NOTE — PROGRESS NOTE ADULT - SUBJECTIVE AND OBJECTIVE BOX
Patient is a 68y old  Female who presents with a chief complaint of Bradycardia (11 May 2018 23:46)      SUBJECTIVE / OVERNIGHT EVENTS:    Patient seen and examined.       Vital Signs Last 24 Hrs  T(C): 36.7 (17 May 2018 04:10), Max: 36.7 (17 May 2018 04:10)  T(F): 98 (17 May 2018 04:10), Max: 98 (17 May 2018 04:10)  HR: 82 (17 May 2018 06:00) (70 - 85)  BP: 123/76 (17 May 2018 06:00) (123/76 - 181/72)  BP(mean): --  RR: 18 (17 May 2018 05:08) (17 - 18)  SpO2: 98% (17 May 2018 05:08) (97% - 100%)  I&O's Summary    16 May 2018 07:01  -  17 May 2018 07:00  --------------------------------------------------------  IN: 300 mL / OUT: 0 mL / NET: 300 mL    17 May 2018 07:01  -  17 May 2018 10:39  --------------------------------------------------------  IN: 120 mL / OUT: 0 mL / NET: 120 mL        PE:  GENERAL: NAD, AAOx3  HEAD:  Atraumatic, Normocephalic  EYES: EOMI, PERRLA, conjunctiva and sclera clear  NECK: Supple, No JVD  CHEST/LUNG: CTABL, No wheeze  HEART: Regular rate and rhythm; + murmur  ABDOMEN: Soft, Nontender, Nondistended; Bowel sounds present  EXTREMITIES:  2+ Peripheral Pulses, No clubbing, cyanosis, or edema  SKIN: No rashes or lesions  NEURO: No focal deficits    LABS:                        11.9   4.91  )-----------( 265      ( 17 May 2018 07:42 )             37.2     05-17    134<L>  |  101  |  14  ----------------------------<  104<H>  4.7   |  23  |  0.85    Ca    9.5      17 May 2018 06:13        CAPILLARY BLOOD GLUCOSE      POCT Blood Glucose.: 137 mg/dL (17 May 2018 07:54)  POCT Blood Glucose.: 147 mg/dL (16 May 2018 21:15)  POCT Blood Glucose.: 183 mg/dL (16 May 2018 16:43)  POCT Blood Glucose.: 286 mg/dL (16 May 2018 11:55)            RADIOLOGY & ADDITIONAL TESTS:    Imaging Personally Reviewed:  [x] YES  [ ] NO    Consultant(s) Notes Reviewed:  [x] YES  [ ] NO    MEDICATIONS  (STANDING):  ALBUTerol/ipratropium for Nebulization 3 milliLiter(s) Nebulizer every 4 hours  aspirin  chewable 81 milliGRAM(s) Oral daily  BACItracin   Ointment 1 Application(s) Topical daily  carvedilol 12.5 milliGRAM(s) Oral every 12 hours  clotrimazole 1% Cream 1 Application(s) Topical two times a day  dextrose 5%. 1000 milliLiter(s) (50 mL/Hr) IV Continuous <Continuous>  dextrose 50% Injectable 12.5 Gram(s) IV Push once  dextrose 50% Injectable 25 Gram(s) IV Push once  dextrose 50% Injectable 25 Gram(s) IV Push once  heparin  Injectable 5000 Unit(s) SubCutaneous every 8 hours  hydrALAZINE 25 milliGRAM(s) Oral every 12 hours  insulin glargine Injectable (LANTUS) 15 Unit(s) SubCutaneous at bedtime  insulin lispro (HumaLOG) corrective regimen sliding scale   SubCutaneous at bedtime  insulin lispro (HumaLOG) corrective regimen sliding scale   SubCutaneous three times a day before meals  insulin lispro Injectable (HumaLOG) 5 Unit(s) SubCutaneous three times a day with meals  sodium chloride 0.9%. 1000 milliLiter(s) (75 mL/Hr) IV Continuous <Continuous>    MEDICATIONS  (PRN):  dextrose 40% Gel 15 Gram(s) Oral once PRN Blood Glucose LESS THAN 70 milliGRAM(s)/deciliter  glucagon  Injectable 1 milliGRAM(s) IntraMuscular once PRN Glucose LESS THAN 70 milligrams/deciliter  hydrALAZINE Injectable 10 milliGRAM(s) IV Push every 6 hours PRN SBP > 160      Care Discussed with Consultants/Other Providers [x] YES  [ ] NO    HEALTH ISSUES - PROBLEM Dx:  Tracheostomy in place: Tracheostomy in place  Back abscess: Back abscess  Hypertension, unspecified type: Hypertension, unspecified type  Type 2 diabetes mellitus with hyperglycemia, with long-term current use of insulin: Type 2 diabetes mellitus with hyperglycemia, with long-term current use of insulin  Hyperkalemia: Hyperkalemia Patient is a 68y old  Female who presents with a chief complaint of Bradycardia (11 May 2018 23:46)      SUBJECTIVE / OVERNIGHT EVENTS:    Patient seen and examined. denies cp sob nvd.    Vital Signs Last 24 Hrs  T(C): 36.7 (17 May 2018 04:10), Max: 36.7 (17 May 2018 04:10)  T(F): 98 (17 May 2018 04:10), Max: 98 (17 May 2018 04:10)  HR: 82 (17 May 2018 06:00) (70 - 85)  BP: 123/76 (17 May 2018 06:00) (123/76 - 181/72)  BP(mean): --  RR: 18 (17 May 2018 05:08) (17 - 18)  SpO2: 98% (17 May 2018 05:08) (97% - 100%)  I&O's Summary    16 May 2018 07:01  -  17 May 2018 07:00  --------------------------------------------------------  IN: 300 mL / OUT: 0 mL / NET: 300 mL    17 May 2018 07:01  -  17 May 2018 10:39  --------------------------------------------------------  IN: 120 mL / OUT: 0 mL / NET: 120 mL        PE:  GENERAL: NAD, AAOx3  HEAD:  Atraumatic, Normocephalic  EYES: EOMI, PERRLA, conjunctiva and sclera clear  NECK: Supple, No JVD, trach collar  CHEST/LUNG: CTABL, No wheeze  HEART: Regular rate and rhythm; no murmur  ABDOMEN: Soft, Nontender, Nondistended; Bowel sounds present  EXTREMITIES:  2+ Peripheral Pulses, No clubbing, cyanosis, or edema  SKIN: No rashes or lesions  NEURO: No focal deficits    LABS:                        11.9   4.91  )-----------( 265      ( 17 May 2018 07:42 )             37.2     05-17    134<L>  |  101  |  14  ----------------------------<  104<H>  4.7   |  23  |  0.85    Ca    9.5      17 May 2018 06:13        CAPILLARY BLOOD GLUCOSE      POCT Blood Glucose.: 137 mg/dL (17 May 2018 07:54)  POCT Blood Glucose.: 147 mg/dL (16 May 2018 21:15)  POCT Blood Glucose.: 183 mg/dL (16 May 2018 16:43)  POCT Blood Glucose.: 286 mg/dL (16 May 2018 11:55)            RADIOLOGY & ADDITIONAL TESTS:    Imaging Personally Reviewed:  [x] YES  [ ] NO    Consultant(s) Notes Reviewed:  [x] YES  [ ] NO    MEDICATIONS  (STANDING):  ALBUTerol/ipratropium for Nebulization 3 milliLiter(s) Nebulizer every 4 hours  aspirin  chewable 81 milliGRAM(s) Oral daily  BACItracin   Ointment 1 Application(s) Topical daily  carvedilol 12.5 milliGRAM(s) Oral every 12 hours  clotrimazole 1% Cream 1 Application(s) Topical two times a day  dextrose 5%. 1000 milliLiter(s) (50 mL/Hr) IV Continuous <Continuous>  dextrose 50% Injectable 12.5 Gram(s) IV Push once  dextrose 50% Injectable 25 Gram(s) IV Push once  dextrose 50% Injectable 25 Gram(s) IV Push once  heparin  Injectable 5000 Unit(s) SubCutaneous every 8 hours  hydrALAZINE 25 milliGRAM(s) Oral every 12 hours  insulin glargine Injectable (LANTUS) 15 Unit(s) SubCutaneous at bedtime  insulin lispro (HumaLOG) corrective regimen sliding scale   SubCutaneous at bedtime  insulin lispro (HumaLOG) corrective regimen sliding scale   SubCutaneous three times a day before meals  insulin lispro Injectable (HumaLOG) 5 Unit(s) SubCutaneous three times a day with meals  sodium chloride 0.9%. 1000 milliLiter(s) (75 mL/Hr) IV Continuous <Continuous>    MEDICATIONS  (PRN):  dextrose 40% Gel 15 Gram(s) Oral once PRN Blood Glucose LESS THAN 70 milliGRAM(s)/deciliter  glucagon  Injectable 1 milliGRAM(s) IntraMuscular once PRN Glucose LESS THAN 70 milligrams/deciliter  hydrALAZINE Injectable 10 milliGRAM(s) IV Push every 6 hours PRN SBP > 160      Care Discussed with Consultants/Other Providers [x] YES  [ ] NO    HEALTH ISSUES - PROBLEM Dx:  Tracheostomy in place: Tracheostomy in place  Back abscess: Back abscess  Hypertension, unspecified type: Hypertension, unspecified type  Type 2 diabetes mellitus with hyperglycemia, with long-term current use of insulin: Type 2 diabetes mellitus with hyperglycemia, with long-term current use of insulin  Hyperkalemia: Hyperkalemia

## 2018-05-17 NOTE — PROGRESS NOTE ADULT - SUBJECTIVE AND OBJECTIVE BOX
CARDIOLOGY FOLLOW UP NOTE - DR. DAMION ROSENBERG    Patient states no new complaints.    MEDICATIONS  (STANDING):  ALBUTerol/ipratropium for Nebulization 3 milliLiter(s) Nebulizer every 4 hours  aspirin  chewable 81 milliGRAM(s) Oral daily  BACItracin   Ointment 1 Application(s) Topical daily  carvedilol 12.5 milliGRAM(s) Oral every 12 hours  clotrimazole 1% Cream 1 Application(s) Topical two times a day  dextrose 5%. 1000 milliLiter(s) (50 mL/Hr) IV Continuous <Continuous>  dextrose 50% Injectable 12.5 Gram(s) IV Push once  dextrose 50% Injectable 25 Gram(s) IV Push once  dextrose 50% Injectable 25 Gram(s) IV Push once  heparin  Injectable 5000 Unit(s) SubCutaneous every 8 hours  hydrALAZINE 25 milliGRAM(s) Oral every 12 hours  insulin glargine Injectable (LANTUS) 15 Unit(s) SubCutaneous at bedtime  insulin lispro (HumaLOG) corrective regimen sliding scale   SubCutaneous at bedtime  insulin lispro (HumaLOG) corrective regimen sliding scale   SubCutaneous three times a day before meals  insulin lispro Injectable (HumaLOG) 5 Unit(s) SubCutaneous three times a day with meals  sodium chloride 0.9%. 1000 milliLiter(s) (75 mL/Hr) IV Continuous <Continuous>        PHYSICAL EXAM:  Vital Signs Last 24 Hrs  T(C): 36.7 (17 May 2018 04:10), Max: 36.7 (17 May 2018 04:10)  T(F): 98 (17 May 2018 04:10), Max: 98 (17 May 2018 04:10)  HR: 82 (17 May 2018 06:00) (70 - 85)  BP: 123/76 (17 May 2018 06:00) (123/76 - 181/72)  BP(mean): --  RR: 18 (17 May 2018 05:08) (17 - 18)  SpO2: 98% (17 May 2018 05:08) (97% - 100%)  I&O's Summary    16 May 2018 07:01  -  17 May 2018 07:00  --------------------------------------------------------  IN: 300 mL / OUT: 0 mL / NET: 300 mL        Telemetry:  sinus 70's-80's    General: NAD	  HEENT:   No JVD, + trach	  Cardiovascular: RRR, S1 and S2 normal  Extremities: No edema    	    LABS:                          11.8   8.68  )-----------( 266      ( 16 May 2018 07:54 )             36.4     05-17    134<L>  |  101  |  14  ----------------------------<  104<H>  4.7   |  23  |  0.85    Ca    9.5      17 May 2018 06:13    HgA1c: Hemoglobin A1C, Whole Blood: 7.4 % (05-16 @ 07:54)        HEALTH ISSUES - PROBLEM Dx:  Tracheostomy in place: Tracheostomy in place  Back abscess: Back abscess  Hypertension, unspecified type: Hypertension, unspecified type  Type 2 diabetes mellitus with hyperglycemia, with long-term current use of insulin: Type 2 diabetes mellitus with hyperglycemia, with long-term current use of insulin  Hyperkalemia: Hyperkalemia        Assessment and Plan:  69 y/o F w/ hx of DM2, HTN, breast CA (dx in 2/2017), with recent hospitalization for respiratory arrest 2/2 influenza, complicated by aspiration PNA, PEA arrest x2, PE s/p tPA, ARF requiring temporary HD, s/p trach and PEG tube, who presents with bradycardia  1. Bradycardia - pt had a sinus node arrest with a junctional escape rhythm, in the setting of hyperkalemia and two AV juanita blockers. I suspect that the hyperkalemia played a larger role than the combination of carvedilol and cardizem, especially since she does not have other evidence of significant conduction disease on her ECG. Unclear etiology of hyperkalemia (maybe due to ramipril), although she did present with an RUSS as well, with elevated creatinine, which has resolved very quickly (although the increased creatinine could also have been due to poor renal perfusion in the setting of significant bradycardia). As soon as hyperkalemia was treated, her sinus node kicked-in. HR is now in the 60's-90's Continue carvedilol at 12.5mg bid and will continue to follow on telemetry.  2. HTN - elevated over the past 24 hours. Will start low dose hydralazine. The patient had reaction to nifedipine in the past, and was hospitalized with a few days of starting diltiazem. Since presenting hyperkalemia may have been related to ACE-I, will avoid ACE/ARB. Will continue carvedilol 12.5mg bid.

## 2018-05-17 NOTE — PROGRESS NOTE ADULT - SUBJECTIVE AND OBJECTIVE BOX
Follow-up Pulm Progress Note  Caleb Mon MD  [phone]    No new respiratory events overnight.  ID and ENT evaluation noted  No evidence of tracheal stenosis/scarring on larygonsoscopy  Afebrile off abx: no evidence active infection  Awake without dyspnea or resp complaints - on speaking valve      Medications:  Vital Signs Last 24 Hrs  T(C): 36.4 (17 May 2018 11:17), Max: 36.7 (17 May 2018 04:10)  T(F): 97.5 (17 May 2018 11:17), Max: 98 (17 May 2018 04:10)  HR: 79 (17 May 2018 11:17) (70 - 84)  BP: 153/73 (17 May 2018 11:17) (123/76 - 181/72)  BP(mean): --  RR: 17 (17 May 2018 11:17) (17 - 18)  SpO2: 98% (17 May 2018 11:17) (97% - 98%)      05-16 @ 07:01  -  05-17 @ 07:00  --------------------------------------------------------  IN: 300 mL / OUT: 0 mL / NET: 300 mL    LABS:                        11.9   4.91  )-----------( 265      ( 17 May 2018 07:42 )             37.2     05-17    134<L>  |  101  |  14  ----------------------------<  104<H>  4.7   |  23  |  0.85    Ca    9.5      17 May 2018 06:13      CULTURES:  Culture Results:   No growth (05-14 @ 00:41)  Culture Results:   No growth at 5 days. (05-12 @ 05:40)  Culture Results:   No growth at 5 days. (05-12 @ 05:40)    Most recent blood culture -- 05-14 @ 00:41   -- -- .Abscess back 05-14 @ 00:41      Physical Examination:  PULM: No wheeze or rhonchi  CVS: Regular rate and rhythm, no murmurs, rubs, or gallops  ABD: Soft, non-tender  EXT:  No clubbing, cyanosis, or edema    RADIOLOGY REVIEWED  CXR:    CT chest:    TTE:

## 2018-05-17 NOTE — PROGRESS NOTE ADULT - SUBJECTIVE AND OBJECTIVE BOX
POD/STATUS POST/ENT ISSUE: Upper airway eval for possible decannulation    INTERVAL HPI: 69 y/o F w/ hx of DM2, HTN, breast CA (dx in 2/2017), w recent hospitalization for respiratory arrest 2/2 influenza, complicated by aspiration pna, PEA arrest x2 with ROS, PE s/p tPA, ARF requiring HD, s/p trach and PEG tube presents with bradycardia and shortness of breath. ENT was called to see pt for upper airway eval for possible decannulation. Bedside indirect laryngoscopy was normal and there was evidence of tracheal stenosis on bedside tracheoscopy. Pt is phonating well, no SOB, no fevers.    Vital Signs Last 24 Hrs  T(C): 36.7 (17 May 2018 04:10), Max: 36.7 (17 May 2018 04:10)  T(F): 98 (17 May 2018 04:10), Max: 98 (17 May 2018 04:10)  HR: 82 (17 May 2018 06:00) (70 - 85)  BP: 123/76 (17 May 2018 06:00) (123/76 - 181/72)  BP(mean): --  RR: 18 (17 May 2018 05:08) (17 - 18)  SpO2: 98% (17 May 2018 05:08) (97% - 100%)    PHYSICAL EXAM:  Gen: NAD, well-developed  Head: Normocephalic, Atraumatic  Eyes: PERRL, EOMI, no scleral injection  Nose: Nares bilaterally patent, no discharge  Mouth: Mucosa moist, tongue/uvula midline, oropharynx clear  Neck: #6 portex uncuffed in place, secured with a velcro tie, tolerating PMV, ventilating well, no bleeding, erythema or purulence around the stoma, Flat, supple, no lymphadenopathy, trachea midline, no masses  Resp:  no stridor  CV: no peripheral edema/cyanosis    LABS:                        11.9   4.91  )-----------( 265      ( 17 May 2018 07:42 )             37.2 POD/STATUS POST/ENT ISSUE: Upper airway eval for possible decannulation    INTERVAL HPI: 67 y/o F w/ hx of DM2, HTN, breast CA (dx in 2/2017), w recent hospitalization for respiratory arrest 2/2 influenza, complicated by aspiration pna, PEA arrest x2 with ROS, PE s/p tPA, ARF requiring HD, s/p trach and PEG tube presents with bradycardia and shortness of breath. ENT was called to see pt for upper airway eval for possible decannulation. Bedside indirect laryngoscopy was normal and there was evidence of tracheal stenosis on bedside tracheoscopy yesterday. Pt is phonating well, no SOB, no fevers.    Vital Signs Last 24 Hrs  T(C): 36.7 (17 May 2018 04:10), Max: 36.7 (17 May 2018 04:10)  T(F): 98 (17 May 2018 04:10), Max: 98 (17 May 2018 04:10)  HR: 82 (17 May 2018 06:00) (70 - 85)  BP: 123/76 (17 May 2018 06:00) (123/76 - 181/72)  BP(mean): --  RR: 18 (17 May 2018 05:08) (17 - 18)  SpO2: 98% (17 May 2018 05:08) (97% - 100%)    PHYSICAL EXAM:  Gen: NAD, well-developed  Head: Normocephalic, Atraumatic  Eyes: PERRL, EOMI, no scleral injection  Nose: Nares bilaterally patent, no discharge  Mouth: Mucosa moist, tongue/uvula midline, oropharynx clear  Neck: #6 portex uncuffed in place, secured with a velcro tie, tolerating PMV, ventilating well, no bleeding, erythema or purulence around the stoma, Flat, supple, no lymphadenopathy, trachea midline, no masses  Resp:  no stridor  CV: no peripheral edema/cyanosis    LABS:                        11.9   4.91  )-----------( 265      ( 17 May 2018 07:42 )             37.2

## 2018-05-17 NOTE — PROGRESS NOTE ADULT - SUBJECTIVE AND OBJECTIVE BOX
Diabetes Follow up note:  Interval Hx:  68 year old female w/T2DM w/neuropathy, CVA, CKD 3 w recent hospitalization for respiratory arrest 2/2 influenza, complicated by aspiration pna, PEA arrest x2 with ROS, PE s/p tPA, ARF requiring HD, s/p trach and PEG tube presents with bradycardia, hyperkalemia w/RUSS on CKD and shortness of breath. BG values improved w/initiation of premeal insulin. Tolerating POs.  at bedside w/patient. Undergoing trach cap trials for possible decannulation.     Review of Systems:  Pt non-verbal today. nods appropriately to questions. Denies n/v/abd pain/hypoglycemia/chest pain, SOB.     MEDS:    insulin glargine Injectable (LANTUS) 15 Unit(s) SubCutaneous at bedtime  insulin lispro (HumaLOG) corrective regimen sliding scale   SubCutaneous at bedtime  insulin lispro (HumaLOG) corrective regimen sliding scale   SubCutaneous three times a day before meals  insulin lispro Injectable (HumaLOG) 5 Unit(s) SubCutaneous three times a day with meals      Allergies    doxycycline (Rash)  isoniazid (Rash)  NIFEdipine (Urticaria; Hives)  vitamin E (Short breath; Urticaria; Hives)      PE:  General: Female lying in bed. NAD.   Vital Signs Last 24 Hrs  T(C): 36.7 (17 May 2018 04:10), Max: 36.7 (17 May 2018 04:10)  T(F): 98 (17 May 2018 04:10), Max: 98 (17 May 2018 04:10)  HR: 82 (17 May 2018 06:00) (70 - 85)  BP: 123/76 (17 May 2018 06:00) (123/76 - 181/72)  BP(mean): --  RR: 18 (17 May 2018 05:08) (17 - 18)  SpO2: 98% (17 May 2018 05:08) (97% - 100%)  HEENT: Trach in place w/speaking valve on   Abd: Soft, NT,ND, + PEG  Extremities: Warm  Neuro: Awake. Non-verbal today but responds by nodding appropriately.     LABS:    POCT Blood Glucose.: 137 mg/dL (05-17-18 @ 07:54)  POCT Blood Glucose.: 147 mg/dL (05-16-18 @ 21:15)  POCT Blood Glucose.: 183 mg/dL (05-16-18 @ 16:43)  POCT Blood Glucose.: 286 mg/dL (05-16-18 @ 11:55)  POCT Blood Glucose.: 133 mg/dL (05-16-18 @ 07:56)  POCT Blood Glucose.: 157 mg/dL (05-15-18 @ 21:35)  POCT Blood Glucose.: 169 mg/dL (05-15-18 @ 17:27)  POCT Blood Glucose.: 119 mg/dL (05-15-18 @ 12:09)  POCT Blood Glucose.: 164 mg/dL (05-15-18 @ 06:38)  POCT Blood Glucose.: 122 mg/dL (05-14-18 @ 23:41)  POCT Blood Glucose.: 110 mg/dL (05-14-18 @ 17:42)  POCT Blood Glucose.: 124 mg/dL (05-14-18 @ 12:08)                            11.9   4.91  )-----------( 265      ( 17 May 2018 07:42 )             37.2       05-17    134<L>  |  101  |  14  ----------------------------<  104<H>  4.7   |  23  |  0.85    Ca    9.5      17 May 2018 06:13        Thyroid Function Tests:  05-11 @ 22:51 TSH 1.11 FreeT4 -- T3 -- Anti TPO -- Anti Thyroglobulin Ab -- TSI --      Hemoglobin A1C, Whole Blood: 7.4 % <H> [4.0 - 5.6] (05-16-18 @ 07:54)            Contact number: marcelino [phone] or [phone]

## 2018-05-17 NOTE — PROGRESS NOTE ADULT - ATTENDING COMMENTS
Valdemar Dash  Attending Physician   Division of Infectious Disease  Pager #[phone]  After 5pm/weekend or no response, call #[phone] Valdemar Dash  Attending Physician   Division of Infectious Disease  Pager #[phone]  After 5pm/weekend or no response, call #[phone]    Will sign off, recall ID if needed #[phone].

## 2018-05-17 NOTE — PROGRESS NOTE ADULT - ASSESSMENT
67 y/o F w/ hx of DM2, HTN, breast CA (dx in 2/2017), w recent hospitalization for respiratory arrest 2/2 influenza, complicated by aspiration pna, PEA arrest x2 with ROS, PE s/p tPA, ARF requiring HD, s/p trach and PEG tube presents with bradycardia and EKG concerning for arrythmia 2/2 medication side effect vs hyperkalemia.     # bradycardia, junctional rhythm and peaked T waves  - resolved, cont BB  - hydralazine 10mg IV Q6H PRN for SBP > 160    # s/p tracheostomy for chronic hypoxemic respiratory failure  - appreciate ENT recs  - duonebs Q6H  - appreciate pulm recs    # Back abcess s/p I&Ded by micu team  - appreciate ID recs, observe off ABX    # DM  - appreciate endo recs    # RUSS on CKD resolved  - Strict I&Os,   - renally dose all meds, avoid nephrotoxins     # PEG tube removal  - appreciate GI recs    # PPX  - heparin subQ    plan of care dw patient and  at bedside 69 y/o F w/ hx of DM2, HTN, breast CA (dx in 2/2017), w recent hospitalization for respiratory arrest 2/2 influenza, complicated by aspiration pna, PEA arrest x2 with ROS, PE s/p tPA, ARF requiring HD, s/p trach and PEG tube presents with bradycardia and EKG concerning for arrythmia 2/2 medication side effect vs hyperkalemia.     # bradycardia, junctional rhythm and peaked T waves  - resolved, cont BB  - hydralazine 10mg IV Q6H PRN for SBP > 160    # s/p tracheostomy for chronic hypoxemic respiratory failure  - appreciate ENT recs  - duonebs Q6H  - appreciate pulm recs  - capping trials    # Back abcess s/p I&Ded by micu team  - appreciate ID recs, observe off ABX    # DM  - appreciate endo recs    # RUSS on CKD resolved  - Strict I&Os,   - renally dose all meds, avoid nephrotoxins     # PEG tube removal  - appreciate GI recs    # PPX  - heparin subQ    plan of care dw patient and  at bedside

## 2018-05-17 NOTE — PROGRESS NOTE ADULT - SUBJECTIVE AND OBJECTIVE BOX
MILANA BALL 68y [id number]    Patient is a 68y old  Female who presents with a chief complaint of Bradycardia (11 May 2018 23:46)      Follow Up/CC:  ID following for back abscess    Interval History/ROS: feels ok    Allergies    doxycycline (Rash)  isoniazid (Rash)  NIFEdipine (Urticaria; Hives)  vitamin E (Short breath; Urticaria; Hives)    Intolerances        ANTIMICROBIALS:      MEDICATIONS  (STANDING):  ALBUTerol/ipratropium for Nebulization 3 milliLiter(s) Nebulizer every 4 hours  alteplase for catheter clearance 2 milliGRAM(s) Catheter once  aspirin  chewable 81 milliGRAM(s) Oral daily  BACItracin   Ointment 1 Application(s) Topical daily  carvedilol 12.5 milliGRAM(s) Oral every 12 hours  clotrimazole 1% Cream 1 Application(s) Topical two times a day  dextrose 5%. 1000 milliLiter(s) (50 mL/Hr) IV Continuous <Continuous>  dextrose 50% Injectable 12.5 Gram(s) IV Push once  dextrose 50% Injectable 25 Gram(s) IV Push once  dextrose 50% Injectable 25 Gram(s) IV Push once  heparin  Injectable 5000 Unit(s) SubCutaneous every 8 hours  hydrALAZINE 25 milliGRAM(s) Oral every 12 hours  insulin glargine Injectable (LANTUS) 15 Unit(s) SubCutaneous at bedtime  insulin lispro (HumaLOG) corrective regimen sliding scale   SubCutaneous at bedtime  insulin lispro (HumaLOG) corrective regimen sliding scale   SubCutaneous three times a day before meals  insulin lispro Injectable (HumaLOG) 5 Unit(s) SubCutaneous three times a day with meals  sodium chloride 0.9%. 1000 milliLiter(s) (75 mL/Hr) IV Continuous <Continuous>    MEDICATIONS  (PRN):  dextrose 40% Gel 15 Gram(s) Oral once PRN Blood Glucose LESS THAN 70 milliGRAM(s)/deciliter  glucagon  Injectable 1 milliGRAM(s) IntraMuscular once PRN Glucose LESS THAN 70 milligrams/deciliter  hydrALAZINE Injectable 10 milliGRAM(s) IV Push every 6 hours PRN SBP > 160        Vital Signs Last 24 Hrs  T(C): 36.4 (17 May 2018 11:17), Max: 36.7 (17 May 2018 04:10)  T(F): 97.5 (17 May 2018 11:17), Max: 98 (17 May 2018 04:10)  HR: 79 (17 May 2018 11:17) (70 - 84)  BP: 153/73 (17 May 2018 11:17) (123/76 - 181/72)  BP(mean): --  RR: 17 (17 May 2018 11:17) (17 - 18)  SpO2: 98% (17 May 2018 11:17) (97% - 98%)    CBC Full  -  ( 17 May 2018 07:42 )  WBC Count : 4.91 K/uL  Hemoglobin : 11.9 g/dL  Hematocrit : 37.2 %  Platelet Count - Automated : 265 K/uL  Mean Cell Volume : 84.9 fl  Mean Cell Hemoglobin : 27.2 pg  Mean Cell Hemoglobin Concentration : 32.0 gm/dL  Auto Neutrophil # : x  Auto Lymphocyte # : x  Auto Monocyte # : x  Auto Eosinophil # : x  Auto Basophil # : x  Auto Neutrophil % : x  Auto Lymphocyte % : x  Auto Monocyte % : x  Auto Eosinophil % : x  Auto Basophil % : x    05-17    134<L>  |  101  |  14  ----------------------------<  104<H>  4.7   |  23  |  0.85    Ca    9.5      17 May 2018 06:13            MICROBIOLOGY:  .Abscess back  05-14-18   No growth  --  --      .Blood Blood-Venous  05-12-18   No growth at 5 days.  --  --    RADIOLOGY

## 2018-05-17 NOTE — PROGRESS NOTE ADULT - ATTENDING COMMENTS
pt cleared for capping trials/decannulation. maria de jesus moore and he will manage. no further ent intervention. please call with questions. advised family at bedside that pt should f/u in office if trach stoma not healed within 2 weeks

## 2018-05-18 LAB
CULTURE RESULTS: SIGNIFICANT CHANGE UP
GLUCOSE BLDC GLUCOMTR-MCNC: 139 MG/DL — HIGH (ref 70–99)
GLUCOSE BLDC GLUCOMTR-MCNC: 140 MG/DL — HIGH (ref 70–99)
GLUCOSE BLDC GLUCOMTR-MCNC: 190 MG/DL — HIGH (ref 70–99)
GLUCOSE BLDC GLUCOMTR-MCNC: 265 MG/DL — HIGH (ref 70–99)
SPECIMEN SOURCE: SIGNIFICANT CHANGE UP

## 2018-05-18 PROCEDURE — 99232 SBSQ HOSP IP/OBS MODERATE 35: CPT

## 2018-05-18 RX ORDER — ALTEPLASE 100 MG
2 KIT INTRAVENOUS ONCE
Qty: 0 | Refills: 0 | Status: COMPLETED | OUTPATIENT
Start: 2018-05-18 | End: 2018-05-18

## 2018-05-18 RX ORDER — HYDROCORTISONE 1 %
1 OINTMENT (GRAM) TOPICAL
Qty: 0 | Refills: 0 | Status: COMPLETED | OUTPATIENT
Start: 2018-05-18 | End: 2018-05-20

## 2018-05-18 RX ADMIN — Medication 3 MILLILITER(S): at 18:24

## 2018-05-18 RX ADMIN — Medication 3: at 12:15

## 2018-05-18 RX ADMIN — HEPARIN SODIUM 5000 UNIT(S): 5000 INJECTION INTRAVENOUS; SUBCUTANEOUS at 21:54

## 2018-05-18 RX ADMIN — Medication 81 MILLIGRAM(S): at 12:16

## 2018-05-18 RX ADMIN — Medication 5 UNIT(S): at 09:32

## 2018-05-18 RX ADMIN — Medication 5 UNIT(S): at 20:28

## 2018-05-18 RX ADMIN — Medication 3 MILLILITER(S): at 14:37

## 2018-05-18 RX ADMIN — INSULIN GLARGINE 15 UNIT(S): 100 INJECTION, SOLUTION SUBCUTANEOUS at 23:36

## 2018-05-18 RX ADMIN — HEPARIN SODIUM 5000 UNIT(S): 5000 INJECTION INTRAVENOUS; SUBCUTANEOUS at 06:34

## 2018-05-18 RX ADMIN — CARVEDILOL PHOSPHATE 12.5 MILLIGRAM(S): 80 CAPSULE, EXTENDED RELEASE ORAL at 18:24

## 2018-05-18 RX ADMIN — Medication 3 MILLILITER(S): at 09:32

## 2018-05-18 RX ADMIN — Medication 3 MILLILITER(S): at 21:54

## 2018-05-18 RX ADMIN — Medication 5 UNIT(S): at 12:15

## 2018-05-18 RX ADMIN — Medication 1 APPLICATION(S): at 12:45

## 2018-05-18 RX ADMIN — HEPARIN SODIUM 5000 UNIT(S): 5000 INJECTION INTRAVENOUS; SUBCUTANEOUS at 15:22

## 2018-05-18 RX ADMIN — Medication 1 APPLICATION(S): at 21:54

## 2018-05-18 RX ADMIN — CARVEDILOL PHOSPHATE 12.5 MILLIGRAM(S): 80 CAPSULE, EXTENDED RELEASE ORAL at 06:34

## 2018-05-18 RX ADMIN — Medication 3 MILLILITER(S): at 06:34

## 2018-05-18 RX ADMIN — Medication 3 MILLILITER(S): at 01:41

## 2018-05-18 RX ADMIN — Medication 1 APPLICATION(S): at 14:47

## 2018-05-18 RX ADMIN — ALTEPLASE 2 MILLIGRAM(S): KIT at 15:30

## 2018-05-18 RX ADMIN — Medication 1 APPLICATION(S): at 08:45

## 2018-05-18 NOTE — PROVIDER CONTACT NOTE (OTHER) - BACKGROUND
Pt dx hyperkalemia, h/o trache, DM2, back abscess, breast CA. LCW Mediport was assessed and flushed by IV team with Cathflo 5/17/18 for similar reason.

## 2018-05-18 NOTE — PROGRESS NOTE ADULT - ASSESSMENT
Breast CA  s/p respiratory failure with tracheostomy - now clinically stable and candidate for decannulation if capping trial is successful  CKD  s/p watershed CNS infarct  Bradycardia    REC    Decannulate today  OBserve 5/19 in hospital  CLear from pulm POB for DC on sunday, though cardiac issues may dictate DC planning  No need for O2 at this time

## 2018-05-18 NOTE — PROVIDER CONTACT NOTE (OTHER) - ASSESSMENT
VSS. LCW mediport flushes without difficulty, very minimal to no blood return, unable to collect blood for morning labs.

## 2018-05-18 NOTE — PROVIDER CONTACT NOTE (OTHER) - ACTION/TREATMENT ORDERED:
MD davis, give meds as ordered, check bp in 1 hour nd continue to monitor
As per PA, obtain EKG and CE's. 1g mag iv ordered. PRN suction via trach. 1x duoneb ordered. Stat cxray ordered. Continue to monitor patient.
Will endorse to day nurse to repeat BP in AM and call [facility] for results and further action/treatment.
retried mediport access with no improvement, will endorse to AM nurse and f/u with IV team in AM.

## 2018-05-18 NOTE — PROGRESS NOTE ADULT - SUBJECTIVE AND OBJECTIVE BOX
CARDIOLOGY FOLLOW UP NOTE - DR. DAMION ROSENBERG    Patient states no new complaints. New generalized rash - that pt states is mildly pruritic this AM.    MEDICATIONS  (STANDING):  ALBUTerol/ipratropium for Nebulization 3 milliLiter(s) Nebulizer every 4 hours  aspirin  chewable 81 milliGRAM(s) Oral daily  BACItracin   Ointment 1 Application(s) Topical daily  carvedilol 12.5 milliGRAM(s) Oral every 12 hours  clotrimazole 1% Cream 1 Application(s) Topical two times a day  dextrose 5%. 1000 milliLiter(s) (50 mL/Hr) IV Continuous <Continuous>  dextrose 50% Injectable 12.5 Gram(s) IV Push once  dextrose 50% Injectable 25 Gram(s) IV Push once  dextrose 50% Injectable 25 Gram(s) IV Push once  heparin  Injectable 5000 Unit(s) SubCutaneous every 8 hours  insulin glargine Injectable (LANTUS) 15 Unit(s) SubCutaneous at bedtime  insulin lispro (HumaLOG) corrective regimen sliding scale   SubCutaneous at bedtime  insulin lispro (HumaLOG) corrective regimen sliding scale   SubCutaneous three times a day before meals  insulin lispro Injectable (HumaLOG) 5 Unit(s) SubCutaneous three times a day with meals  sodium chloride 0.9%. 1000 milliLiter(s) (75 mL/Hr) IV Continuous <Continuous>        PHYSICAL EXAM:  Vital Signs Last 24 Hrs  T(C): 36.6 (18 May 2018 06:30), Max: 36.8 (17 May 2018 20:34)  T(F): 97.9 (18 May 2018 06:30), Max: 98.2 (17 May 2018 20:34)  HR: 79 (18 May 2018 06:30) (71 - 80)  BP: 175/77 (18 May 2018 06:30) (120/74 - 177/64)  BP(mean): --  RR: 18 (18 May 2018 06:30) (17 - 18)  SpO2: 97% (18 May 2018 06:30) (95% - 98%)  I&O's Summary    17 May 2018 07:01  -  18 May 2018 07:00  --------------------------------------------------------  IN: 360 mL / OUT: 0 mL / NET: 360 mL        Telemetry:  sinus 70's-90's    General: NAD	  HEENT:   No JVD, + trach	  Cardiovascular: RRR  Extremities: No edema    	    LABS:                          11.9   4.91  )-----------( 265      ( 17 May 2018 07:42 )             37.2     05-17    134<L>  |  101  |  14  ----------------------------<  104<H>  4.7   |  23  |  0.85    Ca    9.5      17 May 2018 06:13        HEALTH ISSUES - PROBLEM Dx:  Tracheostomy in place: Tracheostomy in place  Back abscess: Back abscess  Hypertension, unspecified type: Hypertension, unspecified type  Type 2 diabetes mellitus with hyperglycemia, with long-term current use of insulin: Type 2 diabetes mellitus with hyperglycemia, with long-term current use of insulin  Hyperkalemia: Hyperkalemia            Assessment and Plan:  69 y/o F w/ hx of DM2, HTN, breast CA (dx in 2/2017), with recent hospitalization for respiratory arrest 2/2 influenza, complicated by aspiration PNA, PEA arrest x2, PE s/p tPA, ARF requiring temporary HD, s/p trach and PEG tube, who presents with bradycardia  1. Bradycardia - pt had a sinus node arrest with a junctional escape rhythm, in the setting of hyperkalemia and two AV juanita blockers. I suspect that the hyperkalemia played a larger role than the combination of carvedilol and cardizem, especially since she does not have other evidence of significant conduction disease on her ECG. Unclear etiology of hyperkalemia (maybe due to ramipril), although she did present with an RUSS as well, with elevated creatinine, which has resolved very quickly (although the increased creatinine could also have been due to poor renal perfusion in the setting of significant bradycardia). As soon as hyperkalemia was treated, her sinus node kicked-in. HR is now in the 60's-90's Continue carvedilol at 12.5mg bid and will continue to follow on telemetry.  2. HTN - elevated BP. Hydralazine po started yesterday (last IV dose was on 5/13). Yesterday afternoon she was noted to have a rash, which has become generalized. This may be delayed from prior antibiotics earlier this week, but it may also be due to hydralazine. Will stop hydralazine. If BP will be elevated would start a low dose clonidine patch. The patient had reaction to nifedipine in the past, and was hospitalized within a few days of starting diltiazem. Since presenting hyperkalemia may have been related to ACE-I, will avoid ACE/ARB. Will continue carvedilol 12.5mg bid.

## 2018-05-18 NOTE — PROGRESS NOTE ADULT - SUBJECTIVE AND OBJECTIVE BOX
Diabetes Follow up note:  Interval Hx:  68 year old female w/T2DM w/neuropathy, CVA, CKD 3 w recent hospitalization for respiratory arrest 2/2 influenza, complicated by aspiration pna, PEA arrest x2 with ROS, PE s/p tPA, ARF requiring HD, s/p trach and PEG tube presents with bradycardia, hyperkalemia w/RUSS on CKD and shortness of breath. Pt now tolerating meals, to be decannulated today. BG was tested after drinking coffee around lunchtime today and was 265.  retested when I was at bedside prior to lunch and now 160's.       Review of Systems:  General: No complaints.   GI: Tolerating POs without any N/V/D/ABD PAIN.  CV: No CP/SOB  ENDO: No S&Sx of hypoglycemia  MEDS:    insulin glargine Injectable (LANTUS) 15 Unit(s) SubCutaneous at bedtime  insulin lispro (HumaLOG) corrective regimen sliding scale   SubCutaneous at bedtime  insulin lispro (HumaLOG) corrective regimen sliding scale   SubCutaneous three times a day before meals  insulin lispro Injectable (HumaLOG) 5 Unit(s) SubCutaneous three times a day with meals      Allergies    doxycycline (Rash)  isoniazid (Rash)  NIFEdipine (Urticaria; Hives)  vitamin E (Short breath; Urticaria; Hives)        PE:  General: Female lying in bed. NAD.   Vital Signs Last 24 Hrs  T(C): 36.8 (18 May 2018 11:53), Max: 36.8 (17 May 2018 20:34)  T(F): 98.3 (18 May 2018 11:53), Max: 98.3 (18 May 2018 11:53)  HR: 80 (18 May 2018 11:53) (71 - 80)  BP: 143/82 (18 May 2018 11:53) (120/74 - 177/64)  BP(mean): --  RR: 17 (18 May 2018 11:53) (17 - 18)  SpO2: 96% (18 May 2018 11:53) (95% - 98%)  HEENT: Trach in place. Capped.   Abd: Soft, NT,ND,   Extremities: Warm  Neuro: A&O X3    LABS:    POCT Blood Glucose.: 265 mg/dL (05-18-18 @ 11:52)  POCT Blood Glucose.: 139 mg/dL (05-18-18 @ 07:39)  POCT Blood Glucose.: 170 mg/dL (05-17-18 @ 21:16)  POCT Blood Glucose.: 179 mg/dL (05-17-18 @ 17:51)  POCT Blood Glucose.: 210 mg/dL (05-17-18 @ 16:41)  POCT Blood Glucose.: 158 mg/dL (05-17-18 @ 11:40)  POCT Blood Glucose.: 137 mg/dL (05-17-18 @ 07:54)  POCT Blood Glucose.: 147 mg/dL (05-16-18 @ 21:15)  POCT Blood Glucose.: 183 mg/dL (05-16-18 @ 16:43)  POCT Blood Glucose.: 286 mg/dL (05-16-18 @ 11:55)  POCT Blood Glucose.: 133 mg/dL (05-16-18 @ 07:56)  POCT Blood Glucose.: 157 mg/dL (05-15-18 @ 21:35)  POCT Blood Glucose.: 169 mg/dL (05-15-18 @ 17:27)                            11.9   4.91  )-----------( 265      ( 17 May 2018 07:42 )             37.2       05-17    134<L>  |  101  |  14  ----------------------------<  104<H>  4.7   |  23  |  0.85    Ca    9.5      17 May 2018 06:13        Thyroid Function Tests:  05-11 @ 22:51 TSH 1.11 FreeT4 -- T3 -- Anti TPO -- Anti Thyroglobulin Ab -- TSI --      Hemoglobin A1C, Whole Blood: 7.4 % <H> [4.0 - 5.6] (05-16-18 @ 07:54)            Contact number: marcelino [phone] or [phone]

## 2018-05-18 NOTE — CHART NOTE - NSCHARTNOTEFT_GEN_A_CORE
Called by RN to report that pt's mediport does not have blood return. Cath-steph ordered and administer as per protocol. Blood return noted. Continue to monitor.    Meredith Greenwood  Spectra-link [phone]

## 2018-05-18 NOTE — PROGRESS NOTE ADULT - ASSESSMENT
68 yr F with recent hospitalization for respiratory failure, s/p PEA arrest now with Trach and PEG and hx of T2DM c/b neuropathy, CVA and CKD III here with hyperkalemia and bradycardia. Tolerating POs. BG mostly at goal but sometimes tested off the schedule the family has been feeding the patient. Discussed w/ and RN-need to coordinate meals to prevent insulin:carb mismatch and overcorrection. BG goal (100-180mg/dl).

## 2018-05-18 NOTE — PROGRESS NOTE ADULT - SUBJECTIVE AND OBJECTIVE BOX
Follow-up Pulm Progress Note  Caleb Mon MD  [phone]    No new respiratory events overnight.  Tolerated capping trial yesterday and this morning  O2 sat 98% on RA  No evidence of tracheal stenosis/scarring on larygonsoscopy  Afebrile off abx: no evidence active infection  Awake without dyspnea or resp complaints - on speaking valve    Vital Signs Last 24 Hrs  T(C): 36.8 (18 May 2018 11:53), Max: 36.8 (17 May 2018 20:34)  T(F): 98.3 (18 May 2018 11:53), Max: 98.3 (18 May 2018 11:53)  HR: 80 (18 May 2018 11:53) (71 - 80)  BP: 143/82 (18 May 2018 11:53) (120/74 - 177/64)  BP(mean): --  RR: 17 (18 May 2018 11:53) (17 - 18)  SpO2: 96% (18 May 2018 11:53) (95% - 98%)                          11.9   4.91  )-----------( 265      ( 17 May 2018 07:42 )             37.2       05-17    134<L>  |  101  |  14  ----------------------------<  104<H>  4.7   |  23  |  0.85    Ca    9.5      17 May 2018 06:13      CULTURES:  Culture Results:   No growth (05-14 @ 00:41)  Culture Results:   No growth at 5 days. (05-12 @ 05:40)  Culture Results:   No growth at 5 days. (05-12 @ 05:40)    Most recent blood culture -- 05-14 @ 00:41   -- -- .Abscess back 05-14 @ 00:41      Physical Examination:  PULM: No wheeze or rhonchi  CVS: Regular rate and rhythm, no murmurs, rubs, or gallops  ABD: Soft, non-tender  EXT:  No clubbing, cyanosis, or edema    RADIOLOGY REVIEWED  CXR:    CT chest:    TTE:

## 2018-05-18 NOTE — PROGRESS NOTE ADULT - SUBJECTIVE AND OBJECTIVE BOX
Patient is a 68y old  Female who presents with a chief complaint of Bradycardia (11 May 2018 23:46)      SUBJECTIVE / OVERNIGHT EVENTS:    Patient seen and examined. denies cp sob.       Vital Signs Last 24 Hrs  T(C): 36.6 (18 May 2018 06:30), Max: 36.8 (17 May 2018 20:34)  T(F): 97.9 (18 May 2018 06:30), Max: 98.2 (17 May 2018 20:34)  HR: 79 (18 May 2018 06:30) (71 - 80)  BP: 175/77 (18 May 2018 06:30) (120/74 - 177/64)  BP(mean): --  RR: 18 (18 May 2018 06:30) (17 - 18)  SpO2: 97% (18 May 2018 06:30) (95% - 98%)  I&O's Summary    17 May 2018 07:01  -  18 May 2018 07:00  --------------------------------------------------------  IN: 360 mL / OUT: 0 mL / NET: 360 mL        PE:  GENERAL: NAD, AAOx3  HEAD:  Atraumatic, Normocephalic  EYES: EOMI, PERRLA, conjunctiva and sclera clear  NECK: Supple, No JVD, trach collar  CHEST/LUNG: CTABL, No wheeze, left chest port  HEART: Regular rate and rhythm; no murmur  ABDOMEN: Soft, Nontender, Nondistended; Bowel sounds present, old peg site  EXTREMITIES:  2+ Peripheral Pulses, No clubbing, cyanosis, or edema  SKIN: No rashes or lesions  NEURO: No focal deficits    LABS:                        11.9   4.91  )-----------( 265      ( 17 May 2018 07:42 )             37.2     05-17    134<L>  |  101  |  14  ----------------------------<  104<H>  4.7   |  23  |  0.85    Ca    9.5      17 May 2018 06:13        CAPILLARY BLOOD GLUCOSE      POCT Blood Glucose.: 139 mg/dL (18 May 2018 07:39)  POCT Blood Glucose.: 170 mg/dL (17 May 2018 21:16)  POCT Blood Glucose.: 179 mg/dL (17 May 2018 17:51)  POCT Blood Glucose.: 210 mg/dL (17 May 2018 16:41)  POCT Blood Glucose.: 158 mg/dL (17 May 2018 11:40)            RADIOLOGY & ADDITIONAL TESTS:    Imaging Personally Reviewed:  [x] YES  [ ] NO    Consultant(s) Notes Reviewed:  [x] YES  [ ] NO    MEDICATIONS  (STANDING):  ALBUTerol/ipratropium for Nebulization 3 milliLiter(s) Nebulizer every 4 hours  aspirin  chewable 81 milliGRAM(s) Oral daily  BACItracin   Ointment 1 Application(s) Topical daily  carvedilol 12.5 milliGRAM(s) Oral every 12 hours  clotrimazole 1% Cream 1 Application(s) Topical two times a day  dextrose 5%. 1000 milliLiter(s) (50 mL/Hr) IV Continuous <Continuous>  dextrose 50% Injectable 12.5 Gram(s) IV Push once  dextrose 50% Injectable 25 Gram(s) IV Push once  dextrose 50% Injectable 25 Gram(s) IV Push once  heparin  Injectable 5000 Unit(s) SubCutaneous every 8 hours  insulin glargine Injectable (LANTUS) 15 Unit(s) SubCutaneous at bedtime  insulin lispro (HumaLOG) corrective regimen sliding scale   SubCutaneous at bedtime  insulin lispro (HumaLOG) corrective regimen sliding scale   SubCutaneous three times a day before meals  insulin lispro Injectable (HumaLOG) 5 Unit(s) SubCutaneous three times a day with meals  sodium chloride 0.9%. 1000 milliLiter(s) (75 mL/Hr) IV Continuous <Continuous>    MEDICATIONS  (PRN):  dextrose 40% Gel 15 Gram(s) Oral once PRN Blood Glucose LESS THAN 70 milliGRAM(s)/deciliter  glucagon  Injectable 1 milliGRAM(s) IntraMuscular once PRN Glucose LESS THAN 70 milligrams/deciliter      Care Discussed with Consultants/Other Providers [x] YES  [ ] NO    HEALTH ISSUES - PROBLEM Dx:  Tracheostomy in place: Tracheostomy in place  Back abscess: Back abscess  Hypertension, unspecified type: Hypertension, unspecified type  Type 2 diabetes mellitus with hyperglycemia, with long-term current use of insulin: Type 2 diabetes mellitus with hyperglycemia, with long-term current use of insulin  Hyperkalemia: Hyperkalemia Patient is a 68y old  Female who presents with a chief complaint of Bradycardia (11 May 2018 23:46)      SUBJECTIVE / OVERNIGHT EVENTS:    Patient seen and examined. denies cp sob. co rash all over body.      Vital Signs Last 24 Hrs  T(C): 36.6 (18 May 2018 06:30), Max: 36.8 (17 May 2018 20:34)  T(F): 97.9 (18 May 2018 06:30), Max: 98.2 (17 May 2018 20:34)  HR: 79 (18 May 2018 06:30) (71 - 80)  BP: 175/77 (18 May 2018 06:30) (120/74 - 177/64)  BP(mean): --  RR: 18 (18 May 2018 06:30) (17 - 18)  SpO2: 97% (18 May 2018 06:30) (95% - 98%)  I&O's Summary    17 May 2018 07:01  -  18 May 2018 07:00  --------------------------------------------------------  IN: 360 mL / OUT: 0 mL / NET: 360 mL        PE:  GENERAL: NAD, AAOx3  HEAD:  Atraumatic, Normocephalic  EYES: EOMI, PERRLA, conjunctiva and sclera clear  NECK: Supple, No JVD, trach collar  CHEST/LUNG: CTABL, No wheeze, left chest port  HEART: Regular rate and rhythm; no murmur  ABDOMEN: Soft, Nontender, Nondistended; Bowel sounds present, old peg site  EXTREMITIES:  2+ Peripheral Pulses, No clubbing, cyanosis, or edema  SKIN: rash all over back, upper arms  NEURO: No focal deficits    LABS:                        11.9   4.91  )-----------( 265      ( 17 May 2018 07:42 )             37.2     05-17    134<L>  |  101  |  14  ----------------------------<  104<H>  4.7   |  23  |  0.85    Ca    9.5      17 May 2018 06:13        CAPILLARY BLOOD GLUCOSE      POCT Blood Glucose.: 139 mg/dL (18 May 2018 07:39)  POCT Blood Glucose.: 170 mg/dL (17 May 2018 21:16)  POCT Blood Glucose.: 179 mg/dL (17 May 2018 17:51)  POCT Blood Glucose.: 210 mg/dL (17 May 2018 16:41)  POCT Blood Glucose.: 158 mg/dL (17 May 2018 11:40)            RADIOLOGY & ADDITIONAL TESTS:    Imaging Personally Reviewed:  [x] YES  [ ] NO    Consultant(s) Notes Reviewed:  [x] YES  [ ] NO    MEDICATIONS  (STANDING):  ALBUTerol/ipratropium for Nebulization 3 milliLiter(s) Nebulizer every 4 hours  aspirin  chewable 81 milliGRAM(s) Oral daily  BACItracin   Ointment 1 Application(s) Topical daily  carvedilol 12.5 milliGRAM(s) Oral every 12 hours  clotrimazole 1% Cream 1 Application(s) Topical two times a day  dextrose 5%. 1000 milliLiter(s) (50 mL/Hr) IV Continuous <Continuous>  dextrose 50% Injectable 12.5 Gram(s) IV Push once  dextrose 50% Injectable 25 Gram(s) IV Push once  dextrose 50% Injectable 25 Gram(s) IV Push once  heparin  Injectable 5000 Unit(s) SubCutaneous every 8 hours  insulin glargine Injectable (LANTUS) 15 Unit(s) SubCutaneous at bedtime  insulin lispro (HumaLOG) corrective regimen sliding scale   SubCutaneous at bedtime  insulin lispro (HumaLOG) corrective regimen sliding scale   SubCutaneous three times a day before meals  insulin lispro Injectable (HumaLOG) 5 Unit(s) SubCutaneous three times a day with meals  sodium chloride 0.9%. 1000 milliLiter(s) (75 mL/Hr) IV Continuous <Continuous>    MEDICATIONS  (PRN):  dextrose 40% Gel 15 Gram(s) Oral once PRN Blood Glucose LESS THAN 70 milliGRAM(s)/deciliter  glucagon  Injectable 1 milliGRAM(s) IntraMuscular once PRN Glucose LESS THAN 70 milligrams/deciliter      Care Discussed with Consultants/Other Providers [x] YES  [ ] NO    HEALTH ISSUES - PROBLEM Dx:  Tracheostomy in place: Tracheostomy in place  Back abscess: Back abscess  Hypertension, unspecified type: Hypertension, unspecified type  Type 2 diabetes mellitus with hyperglycemia, with long-term current use of insulin: Type 2 diabetes mellitus with hyperglycemia, with long-term current use of insulin  Hyperkalemia: Hyperkalemia

## 2018-05-18 NOTE — PROVIDER CONTACT NOTE (OTHER) - SITUATION
pt BP elevated in AM and poss concern for adverse reaction to hydralazine for new onset generalized rash.

## 2018-05-18 NOTE — PROGRESS NOTE ADULT - ASSESSMENT
69 y/o F w/ hx of DM2, HTN, breast CA (dx in 2/2017), w recent hospitalization for respiratory arrest 2/2 influenza, complicated by aspiration pna, PEA arrest x2 with ROS, PE s/p tPA, ARF requiring HD, s/p trach and PEG tube presents with bradycardia and EKG concerning for arrythmia 2/2 medication side effect vs hyperkalemia.     # bradycardia, junctional rhythm and peaked T waves  - resolved, cont BB  - hydralazine 10mg IV Q6H PRN for SBP > 160    # s/p tracheostomy for chronic hypoxemic respiratory failure  - appreciate ENT and pulm recs  - sp capping trial, possible decanulate today  - duonebs Q6H    # Back abcess s/p I&D  - appreciate ID recs, observe off ABX    # DM  - appreciate endo recs    # RUSS on CKD resolved  - Strict I&Os,   - renally dose all meds, avoid nephrotoxins     # PEG tube removal  - appreciate GI recs    # PPX  - heparin subQ    plan of care dw patient and  at bedside 69 y/o F w/ hx of DM2, HTN, breast CA (dx in 2/2017), w recent hospitalization for respiratory arrest 2/2 influenza, complicated by aspiration pna, PEA arrest x2 with ROS, PE s/p tPA, ARF requiring HD, s/p trach and PEG tube presents with bradycardia and EKG concerning for arrythmia 2/2 medication side effect vs hyperkalemia.     # bradycardia, junctional rhythm and peaked T waves  - resolved, cont BB  - stop hydralazine for rash, clonidine patch if necessary    # s/p tracheostomy for chronic hypoxemic respiratory failure  - appreciate ENT and pulm recs  - sp capping trial, possible decanulate today  - duonebs Q6H    # Back abcess s/p I&D  - appreciate ID recs, observe off ABX    # DM  - appreciate endo recs    # RUSS on CKD resolved  - Strict I&Os,   - renally dose all meds, avoid nephrotoxins     # PEG tube removal  - appreciate GI recs    # PPX  - heparin subQ    plan of care dw patient and  at bedside 69 y/o F w/ hx of DM2, HTN, breast CA (dx in 2/2017), w recent hospitalization for respiratory arrest 2/2 influenza, complicated by aspiration pna, PEA arrest x2 with ROS, PE s/p tPA, ARF requiring HD, s/p trach and PEG tube presents with bradycardia and EKG concerning for arrythmia 2/2 medication side effect vs hyperkalemia.     # bradycardia, junctional rhythm and peaked T waves  - resolved, cont BB, asa  - stop hydralazine for rash, clonidine patch if necessary    # s/p tracheostomy for chronic hypoxemic respiratory failure  - appreciate ENT and pulm recs  - sp capping trial, possible decanulate today    # Back abcess s/p I&D  - appreciate ID recs, observe off ABX    # DM  - appreciate endo recs    # RUSS on CKD resolved  - Strict I&Os,   - renally dose all meds, avoid nephrotoxins     # PEG tube removal  - appreciate GI recs    # PPX  - heparin subQ    plan of care dw patient and  at bedside

## 2018-05-18 NOTE — PROVIDER CONTACT NOTE (OTHER) - RECOMMENDATIONS
hold AM hydralazine for suspicion of adverse allergic reaction, repeat BP in AM and call MD to determine POC for new BP regimen. Poss. plan to start pt on clonidine.

## 2018-05-19 LAB
ANION GAP SERPL CALC-SCNC: 8 MMOL/L — SIGNIFICANT CHANGE UP (ref 5–17)
BUN SERPL-MCNC: 11 MG/DL — SIGNIFICANT CHANGE UP (ref 7–23)
CALCIUM SERPL-MCNC: 9.3 MG/DL — SIGNIFICANT CHANGE UP (ref 8.4–10.5)
CHLORIDE SERPL-SCNC: 100 MMOL/L — SIGNIFICANT CHANGE UP (ref 96–108)
CO2 SERPL-SCNC: 24 MMOL/L — SIGNIFICANT CHANGE UP (ref 22–31)
CREAT SERPL-MCNC: 0.73 MG/DL — SIGNIFICANT CHANGE UP (ref 0.5–1.3)
GLUCOSE BLDC GLUCOMTR-MCNC: 117 MG/DL — HIGH (ref 70–99)
GLUCOSE BLDC GLUCOMTR-MCNC: 158 MG/DL — HIGH (ref 70–99)
GLUCOSE BLDC GLUCOMTR-MCNC: 228 MG/DL — HIGH (ref 70–99)
GLUCOSE BLDC GLUCOMTR-MCNC: 238 MG/DL — HIGH (ref 70–99)
GLUCOSE SERPL-MCNC: 151 MG/DL — HIGH (ref 70–99)
HCT VFR BLD CALC: 36.6 % — SIGNIFICANT CHANGE UP (ref 34.5–45)
HGB BLD-MCNC: 11.8 G/DL — SIGNIFICANT CHANGE UP (ref 11.5–15.5)
MCHC RBC-ENTMCNC: 26.9 PG — LOW (ref 27–34)
MCHC RBC-ENTMCNC: 32.2 GM/DL — SIGNIFICANT CHANGE UP (ref 32–36)
MCV RBC AUTO: 83.6 FL — SIGNIFICANT CHANGE UP (ref 80–100)
PLATELET # BLD AUTO: 300 K/UL — SIGNIFICANT CHANGE UP (ref 150–400)
POTASSIUM SERPL-MCNC: 4.4 MMOL/L — SIGNIFICANT CHANGE UP (ref 3.5–5.3)
POTASSIUM SERPL-SCNC: 4.4 MMOL/L — SIGNIFICANT CHANGE UP (ref 3.5–5.3)
RBC # BLD: 4.38 M/UL — SIGNIFICANT CHANGE UP (ref 3.8–5.2)
RBC # FLD: 15.1 % — HIGH (ref 10.3–14.5)
SODIUM SERPL-SCNC: 132 MMOL/L — LOW (ref 135–145)
WBC # BLD: 6.31 K/UL — SIGNIFICANT CHANGE UP (ref 3.8–10.5)
WBC # FLD AUTO: 6.31 K/UL — SIGNIFICANT CHANGE UP (ref 3.8–10.5)

## 2018-05-19 RX ORDER — DIPHENHYDRAMINE HCL 50 MG
12.5 CAPSULE ORAL EVERY 12 HOURS
Qty: 0 | Refills: 0 | Status: DISCONTINUED | OUTPATIENT
Start: 2018-05-19 | End: 2018-05-19

## 2018-05-19 RX ORDER — DIPHENHYDRAMINE HCL 50 MG
12.5 CAPSULE ORAL EVERY 12 HOURS
Qty: 0 | Refills: 0 | Status: DISCONTINUED | OUTPATIENT
Start: 2018-05-19 | End: 2018-05-22

## 2018-05-19 RX ADMIN — Medication 1 APPLICATION(S): at 11:40

## 2018-05-19 RX ADMIN — INSULIN GLARGINE 15 UNIT(S): 100 INJECTION, SOLUTION SUBCUTANEOUS at 23:35

## 2018-05-19 RX ADMIN — Medication 2: at 12:37

## 2018-05-19 RX ADMIN — Medication 5 UNIT(S): at 18:39

## 2018-05-19 RX ADMIN — Medication 3 MILLILITER(S): at 21:07

## 2018-05-19 RX ADMIN — Medication 1: at 08:13

## 2018-05-19 RX ADMIN — Medication 1 APPLICATION(S): at 08:14

## 2018-05-19 RX ADMIN — Medication 5 UNIT(S): at 08:13

## 2018-05-19 RX ADMIN — Medication 5 UNIT(S): at 12:37

## 2018-05-19 RX ADMIN — Medication 81 MILLIGRAM(S): at 11:40

## 2018-05-19 RX ADMIN — Medication 1 APPLICATION(S): at 21:07

## 2018-05-19 RX ADMIN — HEPARIN SODIUM 5000 UNIT(S): 5000 INJECTION INTRAVENOUS; SUBCUTANEOUS at 14:43

## 2018-05-19 RX ADMIN — CARVEDILOL PHOSPHATE 12.5 MILLIGRAM(S): 80 CAPSULE, EXTENDED RELEASE ORAL at 06:13

## 2018-05-19 RX ADMIN — HEPARIN SODIUM 5000 UNIT(S): 5000 INJECTION INTRAVENOUS; SUBCUTANEOUS at 21:07

## 2018-05-19 RX ADMIN — CARVEDILOL PHOSPHATE 12.5 MILLIGRAM(S): 80 CAPSULE, EXTENDED RELEASE ORAL at 18:07

## 2018-05-19 RX ADMIN — HEPARIN SODIUM 5000 UNIT(S): 5000 INJECTION INTRAVENOUS; SUBCUTANEOUS at 06:14

## 2018-05-19 RX ADMIN — Medication 12.5 MILLIGRAM(S): at 20:06

## 2018-05-19 RX ADMIN — Medication 3 MILLILITER(S): at 18:07

## 2018-05-19 RX ADMIN — Medication 3 MILLILITER(S): at 06:13

## 2018-05-19 RX ADMIN — Medication 3 MILLILITER(S): at 11:42

## 2018-05-19 RX ADMIN — Medication 1 APPLICATION(S): at 11:41

## 2018-05-19 NOTE — PROGRESS NOTE ADULT - ASSESSMENT
Breast CA  s/p respiratory failure with tracheostomy - now clinically stable and candidate for decannulation if capping trial is successful  CKD  s/p watershed CNS infarct  Bradycardia    REC    Clear for DC to rehab from pulmonary point of view  No need for oxygen

## 2018-05-19 NOTE — PROGRESS NOTE ADULT - SUBJECTIVE AND OBJECTIVE BOX
Patient is a 68y old  Female who presents with a chief complaint of Bradycardia (11 May 2018 23:46)      SUBJECTIVE / OVERNIGHT EVENTS:    Patient seen and examined.       Vital Signs Last 24 Hrs  T(C): 36.5 (19 May 2018 06:00), Max: 36.9 (18 May 2018 21:43)  T(F): 97.7 (19 May 2018 06:00), Max: 98.5 (18 May 2018 21:43)  HR: 88 (19 May 2018 06:00) (77 - 88)  BP: 165/88 (19 May 2018 06:00) (143/82 - 185/78)  BP(mean): --  RR: 18 (19 May 2018 06:00) (17 - 18)  SpO2: 96% (19 May 2018 06:00) (96% - 98%)  I&O's Summary    18 May 2018 07:01  -  19 May 2018 07:00  --------------------------------------------------------  IN: 530 mL / OUT: 0 mL / NET: 530 mL        PE:  GENERAL: NAD, AAOx3  HEAD:  Atraumatic, Normocephalic  EYES: EOMI, PERRLA, conjunctiva and sclera clear  NECK: Supple, No JVD  CHEST/LUNG: CTABL, No wheeze  HEART: Regular rate and rhythm; + murmur  ABDOMEN: Soft, Nontender, Nondistended; Bowel sounds present  EXTREMITIES:  2+ Peripheral Pulses, No clubbing, cyanosis, or edema  SKIN: No rashes or lesions  NEURO: No focal deficits    LABS:                        11.8   6.31  )-----------( 300      ( 19 May 2018 08:33 )             36.6     05-19    132<L>  |  100  |  11  ----------------------------<  151<H>  4.4   |  24  |  0.73    Ca    9.3      19 May 2018 06:47        CAPILLARY BLOOD GLUCOSE      POCT Blood Glucose.: 158 mg/dL (19 May 2018 07:41)  POCT Blood Glucose.: 190 mg/dL (18 May 2018 23:13)  POCT Blood Glucose.: 140 mg/dL (18 May 2018 20:26)  POCT Blood Glucose.: 265 mg/dL (18 May 2018 11:52)            RADIOLOGY & ADDITIONAL TESTS:    Imaging Personally Reviewed:  [x] YES  [ ] NO    Consultant(s) Notes Reviewed:  [x] YES  [ ] NO    MEDICATIONS  (STANDING):  ALBUTerol/ipratropium for Nebulization 3 milliLiter(s) Nebulizer every 4 hours  aspirin  chewable 81 milliGRAM(s) Oral daily  BACItracin   Ointment 1 Application(s) Topical daily  carvedilol 12.5 milliGRAM(s) Oral every 12 hours  clotrimazole 1% Cream 1 Application(s) Topical two times a day  dextrose 5%. 1000 milliLiter(s) (50 mL/Hr) IV Continuous <Continuous>  dextrose 50% Injectable 12.5 Gram(s) IV Push once  dextrose 50% Injectable 25 Gram(s) IV Push once  dextrose 50% Injectable 25 Gram(s) IV Push once  heparin  Injectable 5000 Unit(s) SubCutaneous every 8 hours  hydrocortisone 1% Ointment 1 Application(s) Topical two times a day  insulin glargine Injectable (LANTUS) 15 Unit(s) SubCutaneous at bedtime  insulin lispro (HumaLOG) corrective regimen sliding scale   SubCutaneous at bedtime  insulin lispro (HumaLOG) corrective regimen sliding scale   SubCutaneous three times a day before meals  insulin lispro Injectable (HumaLOG) 5 Unit(s) SubCutaneous three times a day with meals  sodium chloride 0.9%. 1000 milliLiter(s) (75 mL/Hr) IV Continuous <Continuous>    MEDICATIONS  (PRN):  dextrose 40% Gel 15 Gram(s) Oral once PRN Blood Glucose LESS THAN 70 milliGRAM(s)/deciliter  glucagon  Injectable 1 milliGRAM(s) IntraMuscular once PRN Glucose LESS THAN 70 milligrams/deciliter      Care Discussed with Consultants/Other Providers [x] YES  [ ] NO    HEALTH ISSUES - PROBLEM Dx:  Tracheostomy in place: Tracheostomy in place  Back abscess: Back abscess  Hypertension, unspecified type: Hypertension, unspecified type  Type 2 diabetes mellitus with hyperglycemia, with long-term current use of insulin: Type 2 diabetes mellitus with hyperglycemia, with long-term current use of insulin  Hyperkalemia: Hyperkalemia Patient is a 68y old  Female who presents with a chief complaint of Bradycardia (11 May 2018 23:46)      SUBJECTIVE / OVERNIGHT EVENTS:    Patient seen and examined. denies cp sob. still co itchiness. rash improving.      Vital Signs Last 24 Hrs  T(C): 36.5 (19 May 2018 06:00), Max: 36.9 (18 May 2018 21:43)  T(F): 97.7 (19 May 2018 06:00), Max: 98.5 (18 May 2018 21:43)  HR: 88 (19 May 2018 06:00) (77 - 88)  BP: 165/88 (19 May 2018 06:00) (143/82 - 185/78)  BP(mean): --  RR: 18 (19 May 2018 06:00) (17 - 18)  SpO2: 96% (19 May 2018 06:00) (96% - 98%)  I&O's Summary    18 May 2018 07:01  -  19 May 2018 07:00  --------------------------------------------------------  IN: 530 mL / OUT: 0 mL / NET: 530 mL        PE:  GENERAL: NAD, AAOx3  HEAD:  Atraumatic, Normocephalic  EYES: EOMI, PERRLA, conjunctiva and sclera clear  NECK: Supple, No JVD  CHEST/LUNG: CTABL, No wheeze  HEART: Regular rate and rhythm; + murmur  ABDOMEN: Soft, Nontender, Nondistended; Bowel sounds present  EXTREMITIES:  2+ Peripheral Pulses, No clubbing, cyanosis, or edema  SKIN: rash back and arms  NEURO: No focal deficits    LABS:                        11.8   6.31  )-----------( 300      ( 19 May 2018 08:33 )             36.6     05-19    132<L>  |  100  |  11  ----------------------------<  151<H>  4.4   |  24  |  0.73    Ca    9.3      19 May 2018 06:47        CAPILLARY BLOOD GLUCOSE      POCT Blood Glucose.: 158 mg/dL (19 May 2018 07:41)  POCT Blood Glucose.: 190 mg/dL (18 May 2018 23:13)  POCT Blood Glucose.: 140 mg/dL (18 May 2018 20:26)  POCT Blood Glucose.: 265 mg/dL (18 May 2018 11:52)            RADIOLOGY & ADDITIONAL TESTS:    Imaging Personally Reviewed:  [x] YES  [ ] NO    Consultant(s) Notes Reviewed:  [x] YES  [ ] NO    MEDICATIONS  (STANDING):  ALBUTerol/ipratropium for Nebulization 3 milliLiter(s) Nebulizer every 4 hours  aspirin  chewable 81 milliGRAM(s) Oral daily  BACItracin   Ointment 1 Application(s) Topical daily  carvedilol 12.5 milliGRAM(s) Oral every 12 hours  clotrimazole 1% Cream 1 Application(s) Topical two times a day  dextrose 5%. 1000 milliLiter(s) (50 mL/Hr) IV Continuous <Continuous>  dextrose 50% Injectable 12.5 Gram(s) IV Push once  dextrose 50% Injectable 25 Gram(s) IV Push once  dextrose 50% Injectable 25 Gram(s) IV Push once  heparin  Injectable 5000 Unit(s) SubCutaneous every 8 hours  hydrocortisone 1% Ointment 1 Application(s) Topical two times a day  insulin glargine Injectable (LANTUS) 15 Unit(s) SubCutaneous at bedtime  insulin lispro (HumaLOG) corrective regimen sliding scale   SubCutaneous at bedtime  insulin lispro (HumaLOG) corrective regimen sliding scale   SubCutaneous three times a day before meals  insulin lispro Injectable (HumaLOG) 5 Unit(s) SubCutaneous three times a day with meals  sodium chloride 0.9%. 1000 milliLiter(s) (75 mL/Hr) IV Continuous <Continuous>    MEDICATIONS  (PRN):  dextrose 40% Gel 15 Gram(s) Oral once PRN Blood Glucose LESS THAN 70 milliGRAM(s)/deciliter  glucagon  Injectable 1 milliGRAM(s) IntraMuscular once PRN Glucose LESS THAN 70 milligrams/deciliter      Care Discussed with Consultants/Other Providers [x] YES  [ ] NO    HEALTH ISSUES - PROBLEM Dx:  Tracheostomy in place: Tracheostomy in place  Back abscess: Back abscess  Hypertension, unspecified type: Hypertension, unspecified type  Type 2 diabetes mellitus with hyperglycemia, with long-term current use of insulin: Type 2 diabetes mellitus with hyperglycemia, with long-term current use of insulin  Hyperkalemia: Hyperkalemia

## 2018-05-19 NOTE — PROGRESS NOTE ADULT - ASSESSMENT
69 y/o F w/ hx of DM2, HTN, breast CA (dx in 2/2017), w recent hospitalization for respiratory arrest 2/2 influenza, complicated by aspiration pna, PEA arrest x2 with ROS, PE s/p tPA, ARF requiring HD, s/p trach and PEG tube presents with bradycardia and EKG concerning for arrythmia 2/2 medication side effect vs hyperkalemia.     # bradycardia, junctional rhythm and peaked T waves  - resolved, cont BB, asa  - stop hydralazine for rash, clonidine patch if necessary    # s/p tracheostomy for chronic hypoxemic respiratory failure  - appreciate ENT and pulm recs  - sp decannulation    # Back abcess s/p I&D  - appreciate ID recs, observe off ABX    # DM  - appreciate endo recs    # RUSS on CKD resolved  - Strict I&Os,   - renally dose all meds, avoid nephrotoxins     # PEG tube removal  - appreciate GI recs    # PPX  - heparin subQ    plan of care dw patient and  at bedside  discharge planning 69 y/o F w/ hx of DM2, HTN, breast CA (dx in 2/2017), w recent hospitalization for respiratory arrest 2/2 influenza, complicated by aspiration pna, PEA arrest x2 with ROS, PE s/p tPA, ARF requiring HD, s/p trach and PEG tube presents with bradycardia and EKG concerning for arrythmia 2/2 medication side effect vs hyperkalemia.     # bradycardia, junctional rhythm and peaked T waves  - resolved, cont BB, asa  - stop hydralazine for rash, clonidine patch if necessary  - hydrocortisone topical, benadryl prn as needed for itchiness    # s/p tracheostomy for chronic hypoxemic respiratory failure  - appreciate ENT and pulm recs  - sp decannulation    # Back abcess s/p I&D  - appreciate ID recs, observe off ABX    # DM  - appreciate endo recs    # RUSS on CKD resolved  - Strict I&Os,   - renally dose all meds, avoid nephrotoxins     # PEG tube removal  - appreciate GI recs    # PPX  - heparin subQ    plan of care dw patient and  at bedside  discharge planning

## 2018-05-19 NOTE — PROGRESS NOTE ADULT - SUBJECTIVE AND OBJECTIVE BOX
Follow-up Pulm Progress Note  Caleb Mon MD  [phone]    AFebrile, hemodynamically stable  s/p decannulation on 5/18  O2 sats 98% on RA, breathing and vocalizing without difficulty    Vital Signs Last 24 Hrs  T(C): 36.8 (18 May 2018 11:53), Max: 36.8 (17 May 2018 20:34)  T(F): 98.3 (18 May 2018 11:53), Max: 98.3 (18 May 2018 11:53)  HR: 80 (18 May 2018 11:53) (71 - 80)  BP: 143/82 (18 May 2018 11:53) (120/74 - 177/64)  BP(mean): --  RR: 17 (18 May 2018 11:53) (17 - 18)  SpO2: 96% (18 May 2018 11:53) (95% - 98%)                          11.9   4.91  )-----------( 265      ( 17 May 2018 07:42 )             37.2       05-17    134<L>  |  101  |  14  ----------------------------<  104<H>  4.7   |  23  |  0.85    Ca    9.5      17 May 2018 06:13      CULTURES:  Culture Results:   No growth (05-14 @ 00:41)  Culture Results:   No growth at 5 days. (05-12 @ 05:40)  Culture Results:   No growth at 5 days. (05-12 @ 05:40)    Most recent blood culture -- 05-14 @ 00:41   -- -- .Abscess back 05-14 @ 00:41      Physical Examination:  PULM: No wheeze or rhonchi  CVS: Regular rate and rhythm, no murmurs, rubs, or gallops  ABD: Soft, non-tender  EXT:  No clubbing, cyanosis, or edema    RADIOLOGY REVIEWED  CXR:    CT chest:    TTE:

## 2018-05-20 LAB
ANION GAP SERPL CALC-SCNC: 10 MMOL/L — SIGNIFICANT CHANGE UP (ref 5–17)
BUN SERPL-MCNC: 13 MG/DL — SIGNIFICANT CHANGE UP (ref 7–23)
CALCIUM SERPL-MCNC: 9.2 MG/DL — SIGNIFICANT CHANGE UP (ref 8.4–10.5)
CHLORIDE SERPL-SCNC: 99 MMOL/L — SIGNIFICANT CHANGE UP (ref 96–108)
CO2 SERPL-SCNC: 23 MMOL/L — SIGNIFICANT CHANGE UP (ref 22–31)
CREAT SERPL-MCNC: 0.72 MG/DL — SIGNIFICANT CHANGE UP (ref 0.5–1.3)
GLUCOSE BLDC GLUCOMTR-MCNC: 189 MG/DL — HIGH (ref 70–99)
GLUCOSE BLDC GLUCOMTR-MCNC: 210 MG/DL — HIGH (ref 70–99)
GLUCOSE BLDC GLUCOMTR-MCNC: 249 MG/DL — HIGH (ref 70–99)
GLUCOSE BLDC GLUCOMTR-MCNC: 256 MG/DL — HIGH (ref 70–99)
GLUCOSE SERPL-MCNC: 182 MG/DL — HIGH (ref 70–99)
POTASSIUM SERPL-MCNC: 4.7 MMOL/L — SIGNIFICANT CHANGE UP (ref 3.5–5.3)
POTASSIUM SERPL-SCNC: 4.7 MMOL/L — SIGNIFICANT CHANGE UP (ref 3.5–5.3)
SODIUM SERPL-SCNC: 132 MMOL/L — LOW (ref 135–145)

## 2018-05-20 RX ORDER — DIPHENHYDRAMINE HCL 50 MG
12.5 CAPSULE ORAL ONCE
Qty: 0 | Refills: 0 | Status: COMPLETED | OUTPATIENT
Start: 2018-05-20 | End: 2018-05-20

## 2018-05-20 RX ADMIN — Medication 3 MILLILITER(S): at 10:53

## 2018-05-20 RX ADMIN — Medication 1 APPLICATION(S): at 21:51

## 2018-05-20 RX ADMIN — HEPARIN SODIUM 5000 UNIT(S): 5000 INJECTION INTRAVENOUS; SUBCUTANEOUS at 15:14

## 2018-05-20 RX ADMIN — CARVEDILOL PHOSPHATE 12.5 MILLIGRAM(S): 80 CAPSULE, EXTENDED RELEASE ORAL at 05:50

## 2018-05-20 RX ADMIN — Medication 1: at 08:25

## 2018-05-20 RX ADMIN — Medication 5 UNIT(S): at 08:25

## 2018-05-20 RX ADMIN — HEPARIN SODIUM 5000 UNIT(S): 5000 INJECTION INTRAVENOUS; SUBCUTANEOUS at 21:51

## 2018-05-20 RX ADMIN — INSULIN GLARGINE 15 UNIT(S): 100 INJECTION, SOLUTION SUBCUTANEOUS at 21:51

## 2018-05-20 RX ADMIN — Medication 81 MILLIGRAM(S): at 11:44

## 2018-05-20 RX ADMIN — Medication 1 APPLICATION(S): at 11:43

## 2018-05-20 RX ADMIN — Medication 1 APPLICATION(S): at 08:26

## 2018-05-20 RX ADMIN — HEPARIN SODIUM 5000 UNIT(S): 5000 INJECTION INTRAVENOUS; SUBCUTANEOUS at 05:51

## 2018-05-20 RX ADMIN — Medication 1 APPLICATION(S): at 11:44

## 2018-05-20 RX ADMIN — Medication 2: at 17:47

## 2018-05-20 RX ADMIN — Medication 3 MILLILITER(S): at 21:51

## 2018-05-20 RX ADMIN — Medication 12.5 MILLIGRAM(S): at 13:23

## 2018-05-20 RX ADMIN — Medication 2: at 12:15

## 2018-05-20 RX ADMIN — CARVEDILOL PHOSPHATE 12.5 MILLIGRAM(S): 80 CAPSULE, EXTENDED RELEASE ORAL at 17:10

## 2018-05-20 RX ADMIN — Medication 5 UNIT(S): at 12:15

## 2018-05-20 RX ADMIN — Medication 3 MILLILITER(S): at 05:50

## 2018-05-20 RX ADMIN — Medication 5 UNIT(S): at 17:47

## 2018-05-20 RX ADMIN — Medication 12.5 MILLIGRAM(S): at 21:35

## 2018-05-20 NOTE — PROGRESS NOTE ADULT - SUBJECTIVE AND OBJECTIVE BOX
Patient is a 68y old  Female who presents with a chief complaint of Bradycardia (11 May 2018 23:46)      SUBJECTIVE / OVERNIGHT EVENTS:    Patient seen and examined. denies cp sob.      Vital Signs Last 24 Hrs  T(C): 36.7 (20 May 2018 04:27), Max: 36.8 (19 May 2018 11:34)  T(F): 98 (20 May 2018 04:27), Max: 98.2 (19 May 2018 11:34)  HR: 85 (20 May 2018 04:27) (78 - 85)  BP: 147/68 (20 May 2018 04:27) (135/62 - 165/83)  BP(mean): --  RR: 18 (20 May 2018 04:27) (17 - 18)  SpO2: 97% (20 May 2018 04:27) (97% - 98%)  I&O's Summary    19 May 2018 07:01  -  20 May 2018 07:00  --------------------------------------------------------  IN: 1030 mL / OUT: 0 mL / NET: 1030 mL        PE:  GENERAL: NAD, AAOx3  HEAD:  Atraumatic, Normocephalic  EYES: EOMI, PERRLA, conjunctiva and sclera clear  NECK: Supple, No JVD  CHEST/LUNG: CTABL, No wheeze  HEART: Regular rate and rhythm; + murmur  ABDOMEN: Soft, Nontender, Nondistended; Bowel sounds present  EXTREMITIES:  2+ Peripheral Pulses, No clubbing, cyanosis, or edema  SKIN: rash back and arms  NEURO: No focal deficits    LABS:                        11.8   6.31  )-----------( 300      ( 19 May 2018 08:33 )             36.6     05-20    132<L>  |  99  |  13  ----------------------------<  182<H>  4.7   |  23  |  0.72    Ca    9.2      20 May 2018 06:51        CAPILLARY BLOOD GLUCOSE      POCT Blood Glucose.: 189 mg/dL (20 May 2018 07:56)  POCT Blood Glucose.: 238 mg/dL (19 May 2018 23:33)  POCT Blood Glucose.: 117 mg/dL (19 May 2018 18:33)  POCT Blood Glucose.: 228 mg/dL (19 May 2018 12:33)            RADIOLOGY & ADDITIONAL TESTS:    Imaging Personally Reviewed:  [x] YES  [ ] NO    Consultant(s) Notes Reviewed:  [x] YES  [ ] NO    MEDICATIONS  (STANDING):  ALBUTerol/ipratropium for Nebulization 3 milliLiter(s) Nebulizer every 4 hours  aspirin  chewable 81 milliGRAM(s) Oral daily  BACItracin   Ointment 1 Application(s) Topical daily  carvedilol 12.5 milliGRAM(s) Oral every 12 hours  clotrimazole 1% Cream 1 Application(s) Topical two times a day  dextrose 5%. 1000 milliLiter(s) (50 mL/Hr) IV Continuous <Continuous>  dextrose 50% Injectable 12.5 Gram(s) IV Push once  dextrose 50% Injectable 25 Gram(s) IV Push once  dextrose 50% Injectable 25 Gram(s) IV Push once  heparin  Injectable 5000 Unit(s) SubCutaneous every 8 hours  hydrocortisone 1% Ointment 1 Application(s) Topical two times a day  insulin glargine Injectable (LANTUS) 15 Unit(s) SubCutaneous at bedtime  insulin lispro (HumaLOG) corrective regimen sliding scale   SubCutaneous at bedtime  insulin lispro (HumaLOG) corrective regimen sliding scale   SubCutaneous three times a day before meals  insulin lispro Injectable (HumaLOG) 5 Unit(s) SubCutaneous three times a day with meals    MEDICATIONS  (PRN):  dextrose 40% Gel 15 Gram(s) Oral once PRN Blood Glucose LESS THAN 70 milliGRAM(s)/deciliter  diphenhydrAMINE   Elixir 12.5 milliGRAM(s) Oral every 12 hours PRN Rash and/or Itching  glucagon  Injectable 1 milliGRAM(s) IntraMuscular once PRN Glucose LESS THAN 70 milligrams/deciliter      Care Discussed with Consultants/Other Providers [x] YES  [ ] NO    HEALTH ISSUES - PROBLEM Dx:  Tracheostomy in place: Tracheostomy in place  Back abscess: Back abscess  Hypertension, unspecified type: Hypertension, unspecified type  Type 2 diabetes mellitus with hyperglycemia, with long-term current use of insulin: Type 2 diabetes mellitus with hyperglycemia, with long-term current use of insulin  Hyperkalemia: Hyperkalemia Patient is a 68y old  Female who presents with a chief complaint of Bradycardia (11 May 2018 23:46)      SUBJECTIVE / OVERNIGHT EVENTS:    Patient seen and examined. denies cp sob. still co pruritis. rash on back improved but dtr feels rash now on right side of face. denies throat closing difficulty breathing.      Vital Signs Last 24 Hrs  T(C): 36.7 (20 May 2018 04:27), Max: 36.8 (19 May 2018 11:34)  T(F): 98 (20 May 2018 04:27), Max: 98.2 (19 May 2018 11:34)  HR: 85 (20 May 2018 04:27) (78 - 85)  BP: 147/68 (20 May 2018 04:27) (135/62 - 165/83)  BP(mean): --  RR: 18 (20 May 2018 04:27) (17 - 18)  SpO2: 97% (20 May 2018 04:27) (97% - 98%)  I&O's Summary    19 May 2018 07:01  -  20 May 2018 07:00  --------------------------------------------------------  IN: 1030 mL / OUT: 0 mL / NET: 1030 mL        PE:  GENERAL: NAD, AAOx3  HEAD:  Atraumatic, Normocephalic  EYES: EOMI, PERRLA, conjunctiva and sclera clear  NECK: Supple, No JVD, trach decannulated  CHEST/LUNG: CTABL, No wheeze  HEART: Regular rate and rhythm; no murmur  ABDOMEN: Soft, Nontender, Nondistended; Bowel sounds present  EXTREMITIES:  2+ Peripheral Pulses, No clubbing, cyanosis, or edema  SKIN: rash back redness improved, redness right side of face  NEURO: No focal deficits    LABS:                        11.8   6.31  )-----------( 300      ( 19 May 2018 08:33 )             36.6     05-20    132<L>  |  99  |  13  ----------------------------<  182<H>  4.7   |  23  |  0.72    Ca    9.2      20 May 2018 06:51        CAPILLARY BLOOD GLUCOSE      POCT Blood Glucose.: 189 mg/dL (20 May 2018 07:56)  POCT Blood Glucose.: 238 mg/dL (19 May 2018 23:33)  POCT Blood Glucose.: 117 mg/dL (19 May 2018 18:33)  POCT Blood Glucose.: 228 mg/dL (19 May 2018 12:33)            RADIOLOGY & ADDITIONAL TESTS:    Imaging Personally Reviewed:  [x] YES  [ ] NO    Consultant(s) Notes Reviewed:  [x] YES  [ ] NO    MEDICATIONS  (STANDING):  ALBUTerol/ipratropium for Nebulization 3 milliLiter(s) Nebulizer every 4 hours  aspirin  chewable 81 milliGRAM(s) Oral daily  BACItracin   Ointment 1 Application(s) Topical daily  carvedilol 12.5 milliGRAM(s) Oral every 12 hours  clotrimazole 1% Cream 1 Application(s) Topical two times a day  dextrose 5%. 1000 milliLiter(s) (50 mL/Hr) IV Continuous <Continuous>  dextrose 50% Injectable 12.5 Gram(s) IV Push once  dextrose 50% Injectable 25 Gram(s) IV Push once  dextrose 50% Injectable 25 Gram(s) IV Push once  heparin  Injectable 5000 Unit(s) SubCutaneous every 8 hours  hydrocortisone 1% Ointment 1 Application(s) Topical two times a day  insulin glargine Injectable (LANTUS) 15 Unit(s) SubCutaneous at bedtime  insulin lispro (HumaLOG) corrective regimen sliding scale   SubCutaneous at bedtime  insulin lispro (HumaLOG) corrective regimen sliding scale   SubCutaneous three times a day before meals  insulin lispro Injectable (HumaLOG) 5 Unit(s) SubCutaneous three times a day with meals    MEDICATIONS  (PRN):  dextrose 40% Gel 15 Gram(s) Oral once PRN Blood Glucose LESS THAN 70 milliGRAM(s)/deciliter  diphenhydrAMINE   Elixir 12.5 milliGRAM(s) Oral every 12 hours PRN Rash and/or Itching  glucagon  Injectable 1 milliGRAM(s) IntraMuscular once PRN Glucose LESS THAN 70 milligrams/deciliter      Care Discussed with Consultants/Other Providers [x] YES  [ ] NO    HEALTH ISSUES - PROBLEM Dx:  Tracheostomy in place: Tracheostomy in place  Back abscess: Back abscess  Hypertension, unspecified type: Hypertension, unspecified type  Type 2 diabetes mellitus with hyperglycemia, with long-term current use of insulin: Type 2 diabetes mellitus with hyperglycemia, with long-term current use of insulin  Hyperkalemia: Hyperkalemia

## 2018-05-20 NOTE — PROGRESS NOTE ADULT - ASSESSMENT
67 y/o F w/ hx of DM2, HTN, breast CA (dx in 2/2017), w recent hospitalization for respiratory arrest 2/2 influenza, complicated by aspiration pna, PEA arrest x2 with ROS, PE s/p tPA, ARF requiring HD, s/p trach and PEG tube presents with bradycardia and EKG concerning for arrythmia 2/2 medication side effect vs hyperkalemia.     # bradycardia, junctional rhythm and peaked T waves  - resolved, cont BB, asa  - stop hydralazine for rash, clonidine patch if necessary  - hydrocortisone topical, benadryl prn as needed for itchiness    # s/p tracheostomy for chronic hypoxemic respiratory failure  - appreciate ENT and pulm recs  - sp decannulation    # Back abcess s/p I&D  - appreciate ID recs, observe off ABX    # DM  - appreciate endo recs    # RUSS on CKD resolved  - Strict I&Os,   - renally dose all meds, avoid nephrotoxins     # PEG tube removal  - appreciate GI recs    # PPX  - heparin subQ    plan of care dw patient and  at bedside  discharge planning 69 y/o F w/ hx of DM2, HTN, breast CA (dx in 2/2017), w recent hospitalization for respiratory arrest 2/2 influenza, complicated by aspiration pna, PEA arrest x2 with ROS, PE s/p tPA, ARF requiring HD, s/p trach and PEG tube presents with bradycardia and EKG concerning for arrythmia 2/2 medication side effect vs hyperkalemia.     # bradycardia, junctional rhythm and peaked T waves  - resolved, cont BB, asa  - stop hydralazine for rash, clonidine patch if necessary  - hydrocortisone topical, benadryl prn as needed for itchiness  - if rash worsens, consider derm consult    # s/p tracheostomy for chronic hypoxemic respiratory failure  - appreciate ENT and pulm recs  - sp decannulation    # Back abcess s/p I&D  - appreciate ID recs, observe off ABX    # DM  - appreciate endo recs    # RUSS on CKD resolved  - Strict I&Os,   - renally dose all meds, avoid nephrotoxins     # PEG tube removal  - appreciate GI recs    # PPX  - heparin subQ    plan of care dw patient and dtr at bedside  discharge planning

## 2018-05-21 ENCOUNTER — TRANSCRIPTION ENCOUNTER (OUTPATIENT)
Age: 68
End: 2018-05-21

## 2018-05-21 LAB
ANION GAP SERPL CALC-SCNC: 9 MMOL/L — SIGNIFICANT CHANGE UP (ref 5–17)
BUN SERPL-MCNC: 12 MG/DL — SIGNIFICANT CHANGE UP (ref 7–23)
CALCIUM SERPL-MCNC: 9.2 MG/DL — SIGNIFICANT CHANGE UP (ref 8.4–10.5)
CHLORIDE SERPL-SCNC: 100 MMOL/L — SIGNIFICANT CHANGE UP (ref 96–108)
CO2 SERPL-SCNC: 23 MMOL/L — SIGNIFICANT CHANGE UP (ref 22–31)
CREAT SERPL-MCNC: 0.76 MG/DL — SIGNIFICANT CHANGE UP (ref 0.5–1.3)
GLUCOSE BLDC GLUCOMTR-MCNC: 172 MG/DL — HIGH (ref 70–99)
GLUCOSE BLDC GLUCOMTR-MCNC: 199 MG/DL — HIGH (ref 70–99)
GLUCOSE BLDC GLUCOMTR-MCNC: 233 MG/DL — HIGH (ref 70–99)
GLUCOSE BLDC GLUCOMTR-MCNC: 243 MG/DL — HIGH (ref 70–99)
GLUCOSE SERPL-MCNC: 192 MG/DL — HIGH (ref 70–99)
POTASSIUM SERPL-MCNC: 4.9 MMOL/L — SIGNIFICANT CHANGE UP (ref 3.5–5.3)
POTASSIUM SERPL-SCNC: 4.9 MMOL/L — SIGNIFICANT CHANGE UP (ref 3.5–5.3)
SODIUM SERPL-SCNC: 132 MMOL/L — LOW (ref 135–145)

## 2018-05-21 PROCEDURE — 99232 SBSQ HOSP IP/OBS MODERATE 35: CPT

## 2018-05-21 RX ORDER — INSULIN GLARGINE 100 [IU]/ML
15 INJECTION, SOLUTION SUBCUTANEOUS
Qty: 0 | Refills: 0 | DISCHARGE
Start: 2018-05-21

## 2018-05-21 RX ORDER — ASPIRIN/CALCIUM CARB/MAGNESIUM 324 MG
1 TABLET ORAL
Qty: 0 | Refills: 0 | DISCHARGE
Start: 2018-05-21

## 2018-05-21 RX ORDER — INSULIN LISPRO 100/ML
7 VIAL (ML) SUBCUTANEOUS
Qty: 0 | Refills: 0 | Status: DISCONTINUED | OUTPATIENT
Start: 2018-05-21 | End: 2018-05-22

## 2018-05-21 RX ORDER — CARVEDILOL PHOSPHATE 80 MG/1
1 CAPSULE, EXTENDED RELEASE ORAL
Qty: 0 | Refills: 0 | DISCHARGE
Start: 2018-05-21

## 2018-05-21 RX ORDER — DIPHENHYDRAMINE HCL 50 MG
5 CAPSULE ORAL
Qty: 0 | Refills: 0 | DISCHARGE
Start: 2018-05-21

## 2018-05-21 RX ORDER — IPRATROPIUM/ALBUTEROL SULFATE 18-103MCG
3 AEROSOL WITH ADAPTER (GRAM) INHALATION
Qty: 0 | Refills: 0 | DISCHARGE
Start: 2018-05-21

## 2018-05-21 RX ORDER — BACITRACIN ZINC 500 UNIT/G
1 OINTMENT IN PACKET (EA) TOPICAL
Qty: 0 | Refills: 0 | DISCHARGE
Start: 2018-05-21

## 2018-05-21 RX ADMIN — INSULIN GLARGINE 15 UNIT(S): 100 INJECTION, SOLUTION SUBCUTANEOUS at 21:45

## 2018-05-21 RX ADMIN — Medication 3 MILLILITER(S): at 13:16

## 2018-05-21 RX ADMIN — Medication 7 UNIT(S): at 12:33

## 2018-05-21 RX ADMIN — HEPARIN SODIUM 5000 UNIT(S): 5000 INJECTION INTRAVENOUS; SUBCUTANEOUS at 06:35

## 2018-05-21 RX ADMIN — HEPARIN SODIUM 5000 UNIT(S): 5000 INJECTION INTRAVENOUS; SUBCUTANEOUS at 21:43

## 2018-05-21 RX ADMIN — Medication 5 UNIT(S): at 08:19

## 2018-05-21 RX ADMIN — Medication 1 APPLICATION(S): at 12:35

## 2018-05-21 RX ADMIN — Medication 3 MILLILITER(S): at 21:46

## 2018-05-21 RX ADMIN — Medication 2: at 12:33

## 2018-05-21 RX ADMIN — Medication 1: at 18:50

## 2018-05-21 RX ADMIN — CARVEDILOL PHOSPHATE 12.5 MILLIGRAM(S): 80 CAPSULE, EXTENDED RELEASE ORAL at 17:10

## 2018-05-21 RX ADMIN — Medication 1 APPLICATION(S): at 21:43

## 2018-05-21 RX ADMIN — Medication 1: at 08:20

## 2018-05-21 RX ADMIN — Medication 3 MILLILITER(S): at 17:10

## 2018-05-21 RX ADMIN — CARVEDILOL PHOSPHATE 12.5 MILLIGRAM(S): 80 CAPSULE, EXTENDED RELEASE ORAL at 06:34

## 2018-05-21 RX ADMIN — HEPARIN SODIUM 5000 UNIT(S): 5000 INJECTION INTRAVENOUS; SUBCUTANEOUS at 13:16

## 2018-05-21 RX ADMIN — Medication 7 UNIT(S): at 18:50

## 2018-05-21 RX ADMIN — Medication 81 MILLIGRAM(S): at 12:34

## 2018-05-21 RX ADMIN — Medication 3 MILLILITER(S): at 06:34

## 2018-05-21 RX ADMIN — Medication 3 MILLILITER(S): at 09:03

## 2018-05-21 RX ADMIN — Medication 3 MILLILITER(S): at 02:18

## 2018-05-21 NOTE — PROGRESS NOTE ADULT - SUBJECTIVE AND OBJECTIVE BOX
Follow-up Pulm Progress Note  Caleb Mon MD  [phone]    AFebrile, hemodynamically stable  s/p decannulation on 5/18  O2 sats 98% on RA, breathing and vocalizing without difficulty  Stable resp status    Vital Signs Last 24 Hrs  T(C): 36.7 (21 May 2018 11:34), Max: 36.9 (20 May 2018 21:37)  T(F): 98 (21 May 2018 11:34), Max: 98.4 (20 May 2018 21:37)  HR: 81 (21 May 2018 11:34) (79 - 81)  BP: 133/79 (21 May 2018 11:34) (133/79 - 167/83)  BP(mean): --  RR: 18 (21 May 2018 11:34) (18 - 18)  SpO2: 98% (21 May 2018 11:34) (96% - 98%)    05-21    132<L>  |  100  |  12  ----------------------------<  192<H>  4.9   |  23  |  0.76    Ca    9.2      21 May 2018 06:51      CULTURES:  Culture Results:   No growth (05-14 @ 00:41)  Culture Results:   No growth at 5 days. (05-12 @ 05:40)  Culture Results:   No growth at 5 days. (05-12 @ 05:40)    Most recent blood culture -- 05-14 @ 00:41   -- -- .Abscess back 05-14 @ 00:41      Physical Examination:  PULM: No wheeze or rhonchi  CVS: Regular rate and rhythm, no murmurs, rubs, or gallops  ABD: Soft, non-tender  EXT:  No clubbing, cyanosis, or edema    RADIOLOGY REVIEWED  CXR:    CT chest:    TTE:

## 2018-05-21 NOTE — DISCHARGE NOTE ADULT - CARE PROVIDERS DIRECT ADDRESSES
,DirectAddress_Unknown ,DirectAddress_Unknown,[email or web address]

## 2018-05-21 NOTE — PROGRESS NOTE ADULT - PROBLEM SELECTOR PLAN 1
-test BG AC/HS  -c/w Lantus 15 units QHS  -Increase Humalog 7 units AC meals  -c/w Humalog low correction scale AC and Low HS scale  -can be discharged to rehab on present insulin regimen.   -f/u outpt w/Dr Weiner  pager: [phone].
-test BG AC/HS  -c/w Lantus 15 units QHS  -Start Humalog 5 units AC meals  -c/w Humalog low correction scale AC and add HS low scale  -discharge plan: restart home regimen of 70/30  -f/u outpt w/Dr Weiner  pager: [phone]
-test BG AC/HS  -c/w Lantus 15 units QHS  -c/w Humalog 5 units AC meals (hold if not eating, may need to adjust times of insulin schedule to coordinate with family)  -c/w Humalog low correction scale AC and Low HS scale  -Recommend continuing with basal/bolus if discharged to rehab  -f/u outpt w/Dr Weiner  -discussed w/pt, family and RN  pager: [phone]
-test BG AC/HS  -c/w Lantus 15 units QHS  -c/w Humalog 5 units AC meals. May adjust down if BG trending down between meals  -c/w Humalog low correction scale AC and Low HS scale  -discharge plan: restart home regimen of 70/30  -f/u outpt w/Dr Weiner  pager: [phone].
capping trials with O2 monitoring  We'll follow.  Diet per speech
-no fever  -cultures negative  -hold off more abx  -site clean  -daily dressing changes

## 2018-05-21 NOTE — DISCHARGE NOTE ADULT - ADDITIONAL INSTRUCTIONS
Follow-up with your Primary Care Doctor within one week  Follow-up with your Primary Care Doctor within 2-3 days  Follow-up with your Cardiologist within one week Follow-up with your Primary Care Doctor within one week  Follow-up with your Primary Care Doctor within 2-3 days  Follow-up with your Cardiologist within one week  Follow-up with Endocrinology within one week

## 2018-05-21 NOTE — PROGRESS NOTE ADULT - PROBLEM SELECTOR PROBLEM 1
Tracheostomy in place
Type 2 diabetes mellitus with hyperglycemia, with long-term current use of insulin
Back abscess

## 2018-05-21 NOTE — DISCHARGE NOTE ADULT - CARE PLAN
Principal Discharge DX:	Hyperkalemia  Goal:	resolution  Assessment and plan of treatment:	Avoid Nephro-toxic medication  Follow-up with your Primary Care Doctor  Secondary Diagnosis:	Hypertension, unspecified type  Assessment and plan of treatment:	Take medications for your blood pressure as recommended.  Eat a heart healthy diet that is low in saturated fats and salt, and includes whole grains, fruits, vegetables and lean protein   Exercise regularly (consult with your physician or cardiologist first); maintain a heart healthy weight.   If you smoke - quit (A resource to help you stop smoking is the [facility] – phone number [phone].). Continue to follow with your primary physician or cardiologist.   Seek medical help for dizziness, Lightheadedness, Blurry vision, Headache, Chest pain, Shortness of breath  Follow up with your medical doctor to establish long term blood pressure treatment goals.  Secondary Diagnosis:	Tracheostomy in place  Assessment and plan of treatment:	Patient de-cannulated on 5/18/18  Secondary Diagnosis:	Type 2 diabetes mellitus with hyperglycemia, with long-term current use of insulin  Assessment and plan of treatment:	HgA1C this admission.  Make sure you get your HgA1c checked every three months.  If you take oral diabetes medications, check your blood glucose two times a day.  If you take insulin, check your blood glucose before meals and at bedtime.  It's important not to skip any meals.  Keep a log of your blood glucose results and always take it with you to your doctor appointments.  Keep a list of your current medications including injectables and over the counter medications and bring this medication list with you to all your doctor appointments.  If you have not seen your ophthalmologist this year call for appointment.  Check your feet daily for redness, sores, or openings. Do not self treat. If no improvement in two days call your primary care physician for an appointment.  Low blood sugar (hypoglycemia) is a blood sugar below 70mg/dl. Check your blood sugar if you feel signs/symptoms of hypoglycemia. If your blood sugar is below 70 take 15 grams of carbohydrates (ex 4 oz of apple juice, 3-4 glucose tablets, or 4-6 oz of regular soda) wait 15 minutes and repeat blood sugar to make sure it comes up above 70.  If your blood sugar is above 70 and you are due for a meal, have a meal.  If you are not due for a meal have a snack.  This snack helps keeps your blood sugar at a safe range.  Secondary Diagnosis:	Back abscess  Assessment and plan of treatment:	Completed Course of IV antibiotics  Follow-up with your Primary Care Doctor

## 2018-05-21 NOTE — DISCHARGE NOTE ADULT - HOSPITAL COURSE
67 y/o F w/ hx of DM2, HTN, breast CA (dx in 2/2017), w recent hospitalization for respiratory arrest 2/2 influenza, complicated by aspiration pna, PEA arrest x2 with ROS, PE s/p tPA, ARF requiring HD, s/p trach and PEG tube presents with bradycardia and EKG concerning for arrythmia 2/2 medication side effect vs hyperkalemia.     # bradycardia, junctional rhythm and peaked T waves  - resolved, cont BB, asa  - stop hydralazine for rash, clonidine patch if necessary  - hydrocortisone topical, benadryl prn as needed for itchiness  - if rash worsens, consider derm consult    # s/p tracheostomy for chronic hypoxemic respiratory failure  - appreciate ENT and pulm recs  - sp decannulation    # Back abcess s/p I&D  - appreciate ID recs, observe off ABX    # DM  - appreciate endo recs    # RUSS on CKD resolved  - Strict I&Os,   - renally dose all meds, avoid nephrotoxins     # PEG tube removal  - appreciate GI recs    D/c to [facility]

## 2018-05-21 NOTE — PROGRESS NOTE ADULT - ASSESSMENT
Breast CA  s/p respiratory failure with tracheostomy - now clinically stable and candidate for decannulation if capping trial is successful  CKD  s/p watershed CNS infarct  Bradycardia    REC    Clear for DC to rehab from pulmonary point of view  No need for oxygen at this time

## 2018-05-21 NOTE — PROGRESS NOTE ADULT - ASSESSMENT
68 yr F with recent hospitalization for respiratory failure, s/p PEA arrest now with Trach and PEG and hx of T2DM c/b neuropathy, CVA and CKD III here with hyperkalemia and bradycardia. BG values mostly running above goal past two days on present insulin regimen. Will increase mealtime insulin as PO intake has improved. BG goal (100-180mg/dl).

## 2018-05-21 NOTE — PROGRESS NOTE ADULT - SUBJECTIVE AND OBJECTIVE BOX
Patient is a 68y old  Female who presents with a chief complaint of Bradycardia (21 May 2018 09:19)      SUBJECTIVE / OVERNIGHT EVENTS:    Patient seen and examined. denies cp sob nvd. co itchiness.      Vital Signs Last 24 Hrs  T(C): 36.9 (20 May 2018 21:37), Max: 36.9 (20 May 2018 21:37)  T(F): 98.4 (20 May 2018 21:37), Max: 98.4 (20 May 2018 21:37)  HR: 79 (20 May 2018 21:37) (79 - 83)  BP: 163/75 (20 May 2018 21:37) (159/73 - 167/83)  BP(mean): --  RR: 18 (20 May 2018 21:37) (18 - 18)  SpO2: 96% (20 May 2018 21:37) (96% - 98%)  I&O's Summary    20 May 2018 07:01  -  21 May 2018 07:00  --------------------------------------------------------  IN: 350 mL / OUT: 0 mL / NET: 350 mL        PE:  GENERAL: NAD, AAOx3  HEAD:  Atraumatic, Normocephalic  EYES: EOMI, PERRLA, conjunctiva and sclera clear  NECK: Supple, No JVD, trach decannulated  CHEST/LUNG: CTABL, No wheeze  HEART: Regular rate and rhythm; no murmur  ABDOMEN: Soft, Nontender, Nondistended; Bowel sounds present  EXTREMITIES:  2+ Peripheral Pulses, No clubbing, cyanosis, or edema  SKIN: rash back redness improved, redness right side of face  NEURO: No focal deficits    LABS:    05-21    132<L>  |  100  |  12  ----------------------------<  192<H>  4.9   |  23  |  0.76    Ca    9.2      21 May 2018 06:51        CAPILLARY BLOOD GLUCOSE      POCT Blood Glucose.: 199 mg/dL (21 May 2018 07:54)  POCT Blood Glucose.: 256 mg/dL (20 May 2018 21:19)  POCT Blood Glucose.: 210 mg/dL (20 May 2018 16:53)  POCT Blood Glucose.: 249 mg/dL (20 May 2018 12:11)            RADIOLOGY & ADDITIONAL TESTS:    Imaging Personally Reviewed:  [x] YES  [ ] NO    Consultant(s) Notes Reviewed:  [x] YES  [ ] NO    MEDICATIONS  (STANDING):  ALBUTerol/ipratropium for Nebulization 3 milliLiter(s) Nebulizer every 4 hours  aspirin  chewable 81 milliGRAM(s) Oral daily  BACItracin   Ointment 1 Application(s) Topical daily  carvedilol 12.5 milliGRAM(s) Oral every 12 hours  clotrimazole 1% Cream 1 Application(s) Topical two times a day  dextrose 5%. 1000 milliLiter(s) (50 mL/Hr) IV Continuous <Continuous>  dextrose 50% Injectable 12.5 Gram(s) IV Push once  dextrose 50% Injectable 25 Gram(s) IV Push once  dextrose 50% Injectable 25 Gram(s) IV Push once  heparin  Injectable 5000 Unit(s) SubCutaneous every 8 hours  insulin glargine Injectable (LANTUS) 15 Unit(s) SubCutaneous at bedtime  insulin lispro (HumaLOG) corrective regimen sliding scale   SubCutaneous at bedtime  insulin lispro (HumaLOG) corrective regimen sliding scale   SubCutaneous three times a day before meals  insulin lispro Injectable (HumaLOG) 5 Unit(s) SubCutaneous three times a day with meals    MEDICATIONS  (PRN):  dextrose 40% Gel 15 Gram(s) Oral once PRN Blood Glucose LESS THAN 70 milliGRAM(s)/deciliter  diphenhydrAMINE   Elixir 12.5 milliGRAM(s) Oral every 12 hours PRN Rash and/or Itching  glucagon  Injectable 1 milliGRAM(s) IntraMuscular once PRN Glucose LESS THAN 70 milligrams/deciliter      Care Discussed with Consultants/Other Providers [x] YES  [ ] NO    HEALTH ISSUES - PROBLEM Dx:  Tracheostomy in place: Tracheostomy in place  Back abscess: Back abscess  Hypertension, unspecified type: Hypertension, unspecified type  Type 2 diabetes mellitus with hyperglycemia, with long-term current use of insulin: Type 2 diabetes mellitus with hyperglycemia, with long-term current use of insulin  Hyperkalemia: Hyperkalemia Patient is a 68y old  Female who presents with a chief complaint of Bradycardia (21 May 2018 09:19)      SUBJECTIVE / OVERNIGHT EVENTS:    Patient seen and examined. denies cp sob nvd. co itchiness.      Vital Signs Last 24 Hrs  T(C): 36.9 (20 May 2018 21:37), Max: 36.9 (20 May 2018 21:37)  T(F): 98.4 (20 May 2018 21:37), Max: 98.4 (20 May 2018 21:37)  HR: 79 (20 May 2018 21:37) (79 - 83)  BP: 163/75 (20 May 2018 21:37) (159/73 - 167/83)  BP(mean): --  RR: 18 (20 May 2018 21:37) (18 - 18)  SpO2: 96% (20 May 2018 21:37) (96% - 98%)  I&O's Summary    20 May 2018 07:01  -  21 May 2018 07:00  --------------------------------------------------------  IN: 350 mL / OUT: 0 mL / NET: 350 mL        PE:  GENERAL: NAD, AAOx3  HEAD:  Atraumatic, Normocephalic  EYES: EOMI, PERRLA, conjunctiva and sclera clear  NECK: Supple, No JVD, trach decannulated  CHEST/LUNG: CTABL, No wheeze  HEART: Regular rate and rhythm; no murmur  ABDOMEN: Soft, Nontender, Nondistended; Bowel sounds present  EXTREMITIES:  2+ Peripheral Pulses, No clubbing, cyanosis, or edema  SKIN: morbilliform rash on back, redness improved, redness right side of face, bl arms  NEURO: No focal deficits    LABS:    05-21    132<L>  |  100  |  12  ----------------------------<  192<H>  4.9   |  23  |  0.76    Ca    9.2      21 May 2018 06:51        CAPILLARY BLOOD GLUCOSE      POCT Blood Glucose.: 199 mg/dL (21 May 2018 07:54)  POCT Blood Glucose.: 256 mg/dL (20 May 2018 21:19)  POCT Blood Glucose.: 210 mg/dL (20 May 2018 16:53)  POCT Blood Glucose.: 249 mg/dL (20 May 2018 12:11)            RADIOLOGY & ADDITIONAL TESTS:    Imaging Personally Reviewed:  [x] YES  [ ] NO    Consultant(s) Notes Reviewed:  [x] YES  [ ] NO    MEDICATIONS  (STANDING):  ALBUTerol/ipratropium for Nebulization 3 milliLiter(s) Nebulizer every 4 hours  aspirin  chewable 81 milliGRAM(s) Oral daily  BACItracin   Ointment 1 Application(s) Topical daily  carvedilol 12.5 milliGRAM(s) Oral every 12 hours  clotrimazole 1% Cream 1 Application(s) Topical two times a day  dextrose 5%. 1000 milliLiter(s) (50 mL/Hr) IV Continuous <Continuous>  dextrose 50% Injectable 12.5 Gram(s) IV Push once  dextrose 50% Injectable 25 Gram(s) IV Push once  dextrose 50% Injectable 25 Gram(s) IV Push once  heparin  Injectable 5000 Unit(s) SubCutaneous every 8 hours  insulin glargine Injectable (LANTUS) 15 Unit(s) SubCutaneous at bedtime  insulin lispro (HumaLOG) corrective regimen sliding scale   SubCutaneous at bedtime  insulin lispro (HumaLOG) corrective regimen sliding scale   SubCutaneous three times a day before meals  insulin lispro Injectable (HumaLOG) 5 Unit(s) SubCutaneous three times a day with meals    MEDICATIONS  (PRN):  dextrose 40% Gel 15 Gram(s) Oral once PRN Blood Glucose LESS THAN 70 milliGRAM(s)/deciliter  diphenhydrAMINE   Elixir 12.5 milliGRAM(s) Oral every 12 hours PRN Rash and/or Itching  glucagon  Injectable 1 milliGRAM(s) IntraMuscular once PRN Glucose LESS THAN 70 milligrams/deciliter      Care Discussed with Consultants/Other Providers [x] YES  [ ] NO    HEALTH ISSUES - PROBLEM Dx:  Tracheostomy in place: Tracheostomy in place  Back abscess: Back abscess  Hypertension, unspecified type: Hypertension, unspecified type  Type 2 diabetes mellitus with hyperglycemia, with long-term current use of insulin: Type 2 diabetes mellitus with hyperglycemia, with long-term current use of insulin  Hyperkalemia: Hyperkalemia

## 2018-05-21 NOTE — DISCHARGE NOTE ADULT - SECONDARY DIAGNOSIS.
Hypertension, unspecified type Tracheostomy in place Type 2 diabetes mellitus with hyperglycemia, with long-term current use of insulin Back abscess

## 2018-05-21 NOTE — PROGRESS NOTE ADULT - SUBJECTIVE AND OBJECTIVE BOX
Diabetes Follow up note:  Interval Hx:  68 year old female w/T2DM w/neuropathy, CVA, CKD 3 w recent hospitalization for respiratory arrest 2/2 influenza, complicated by aspiration pna, PEA arrest x2 with ROS, PE s/p tPA, ARF requiring HD, s/p trach and PEG tube presents with bradycardia, hyperkalemia w/RUSS on CKD and shortness of breath. Now decannulated, tolerating diet.     Review of Systems:  General: "I'm itchy"  GI: Tolerating POs without any N/V/D/ABD PAIN.  CV: No CP/SOB  ENDO: No S&Sx of hypoglycemia  MEDS:    insulin glargine Injectable (LANTUS) 15 Unit(s) SubCutaneous at bedtime  insulin lispro (HumaLOG) corrective regimen sliding scale   SubCutaneous at bedtime  insulin lispro (HumaLOG) corrective regimen sliding scale   SubCutaneous three times a day before meals  insulin lispro Injectable (HumaLOG) 5 Unit(s) SubCutaneous three times a day with meals      Allergies    doxycycline (Rash)  isoniazid (Rash)  NIFEdipine (Urticaria; Hives)  vitamin E (Short breath; Urticaria; Hives)          PE:  General: Female lying in bed. NAD.   Vital Signs Last 24 Hrs  T(C): 36.9 (20 May 2018 21:37), Max: 36.9 (20 May 2018 21:37)  T(F): 98.4 (20 May 2018 21:37), Max: 98.4 (20 May 2018 21:37)  HR: 79 (20 May 2018 21:37) (79 - 83)  BP: 163/75 (20 May 2018 21:37) (159/73 - 167/83)  BP(mean): --  RR: 18 (20 May 2018 21:37) (18 - 18)  SpO2: 96% (20 May 2018 21:37) (96% - 98%)  HEENT: Trach dsg c/d/i  Abd: Soft, NT,ND,   Extremities: Warm. No edema noted.   Neuro: Awake. Flat affect. Appropriate to situation.     LABS:    POCT Blood Glucose.: 199 mg/dL (05-21-18 @ 07:54)  POCT Blood Glucose.: 256 mg/dL (05-20-18 @ 21:19)  POCT Blood Glucose.: 210 mg/dL (05-20-18 @ 16:53)  POCT Blood Glucose.: 249 mg/dL (05-20-18 @ 12:11)  POCT Blood Glucose.: 189 mg/dL (05-20-18 @ 07:56)  POCT Blood Glucose.: 238 mg/dL (05-19-18 @ 23:33)  POCT Blood Glucose.: 117 mg/dL (05-19-18 @ 18:33)  POCT Blood Glucose.: 228 mg/dL (05-19-18 @ 12:33)  POCT Blood Glucose.: 158 mg/dL (05-19-18 @ 07:41)  POCT Blood Glucose.: 190 mg/dL (05-18-18 @ 23:13)  POCT Blood Glucose.: 140 mg/dL (05-18-18 @ 20:26)  POCT Blood Glucose.: 265 mg/dL (05-18-18 @ 11:52)          05-21    132<L>  |  100  |  12  ----------------------------<  192<H>  4.9   |  23  |  0.76    Ca    9.2      21 May 2018 06:51        Thyroid Function Tests:  05-11 @ 22:51 TSH 1.11 FreeT4 -- T3 -- Anti TPO -- Anti Thyroglobulin Ab -- TSI --      Hemoglobin A1C, Whole Blood: 7.4 % <H> [4.0 - 5.6] (05-16-18 @ 07:54)            Contact number: marcelino [phone] or [phone]

## 2018-05-21 NOTE — DISCHARGE NOTE ADULT - PLAN OF CARE
resolution Avoid Nephro-toxic medication  Follow-up with your Primary Care Doctor Take medications for your blood pressure as recommended.  Eat a heart healthy diet that is low in saturated fats and salt, and includes whole grains, fruits, vegetables and lean protein   Exercise regularly (consult with your physician or cardiologist first); maintain a heart healthy weight.   If you smoke - quit (A resource to help you stop smoking is the [facility] – phone number [phone].). Continue to follow with your primary physician or cardiologist.   Seek medical help for dizziness, Lightheadedness, Blurry vision, Headache, Chest pain, Shortness of breath  Follow up with your medical doctor to establish long term blood pressure treatment goals. Patient de-cannulated on 5/18/18 HgA1C this admission.  Make sure you get your HgA1c checked every three months.  If you take oral diabetes medications, check your blood glucose two times a day.  If you take insulin, check your blood glucose before meals and at bedtime.  It's important not to skip any meals.  Keep a log of your blood glucose results and always take it with you to your doctor appointments.  Keep a list of your current medications including injectables and over the counter medications and bring this medication list with you to all your doctor appointments.  If you have not seen your ophthalmologist this year call for appointment.  Check your feet daily for redness, sores, or openings. Do not self treat. If no improvement in two days call your primary care physician for an appointment.  Low blood sugar (hypoglycemia) is a blood sugar below 70mg/dl. Check your blood sugar if you feel signs/symptoms of hypoglycemia. If your blood sugar is below 70 take 15 grams of carbohydrates (ex 4 oz of apple juice, 3-4 glucose tablets, or 4-6 oz of regular soda) wait 15 minutes and repeat blood sugar to make sure it comes up above 70.  If your blood sugar is above 70 and you are due for a meal, have a meal.  If you are not due for a meal have a snack.  This snack helps keeps your blood sugar at a safe range. Completed Course of IV antibiotics  Follow-up with your Primary Care Doctor

## 2018-05-21 NOTE — DISCHARGE NOTE ADULT - CARE PROVIDER_API CALL
Chino Estrada (MD), Cardiovascular Disease; Internal Medicine  [address] [address]  Phone: [phone]  Fax: [phone] Chino Estrada (MD), Cardiovascular Disease; Internal Medicine  [address] [facility] [address]  [place] [address]  Phone: [phone]  Fax: [phone]    Patria Weiner (MD), EndocrinologyMetabDiabetes; Internal Medicine  [address] [facility] [address]  [place] [address]  Phone: [phone]  Fax: [phone]

## 2018-05-21 NOTE — DISCHARGE NOTE ADULT - MEDICATION SUMMARY - MEDICATIONS TO STOP TAKING
I will STOP taking the medications listed below when I get home from the hospital:    acetaminophen 160 mg/5 mL oral suspension  -- 20.31 milliliter(s) by gastrostomy tube every 6 hours, As Needed - 3)    epoetin odalis  -- 06009 unit(s) subcutaneously once a week on Weds    labetalol  -- 250 milligram(s) by gastrostomy tube 3 times a day, hold SBP<120 or HR<60    Renvela 800 mg oral tablet  -- 1 tab(s) by mouth 3 times a day (with meals)

## 2018-05-21 NOTE — DISCHARGE NOTE ADULT - MEDICATION SUMMARY - MEDICATIONS TO TAKE
I will START or STAY ON the medications listed below when I get home from the hospital:    aspirin 81 mg oral tablet, chewable  -- 1 tab(s) by mouth once a day  -- Indication: For Cad     cloNIDine 0.1 mg/24 hr transdermal film, extended release  -- 1 patch by transdermal patch every 7 days  -- Indication: For Htn     HumaLOG KwikPen 100 units/mL injectable solution  -- 7 unit(s) injectable 3 times a day   -- Indication: For DM     insulin glargine  -- 15 unit(s) subcutaneous once a day (at bedtime)  -- Indication: For Dm     diphenhydrAMINE 12.5 mg/5 mL oral liquid  -- 5 milliliter(s) by mouth every 12 hours, As needed, Rash and/or Itching  -- Indication: For itching      carvedilol 12.5 mg oral tablet  -- 1 tab(s) by mouth every 12 hours  -- Indication: For Htn     ipratropium-albuterol 0.5 mg-2.5 mg/3 mLinhalation solution  -- 3 milliliter(s) inhaled every 4 hours  -- Indication: For respitaory     bacitracin 500 units/g topical ointment  -- 1 application on skin once a day  -- Indication: For derm     clotrimazole 1% topical cream  -- 1 application on skin 2 times a day  -- Indication: For derm

## 2018-05-21 NOTE — DISCHARGE NOTE ADULT - PATIENT PORTAL LINK FT
You can access the [facility] Patient Portal, offered by [facility], by registering with the following website: [email or web address]

## 2018-05-21 NOTE — PROGRESS NOTE ADULT - ASSESSMENT
67 y/o F w/ hx of DM2, HTN, breast CA (dx in 2/2017), w recent hospitalization for respiratory arrest 2/2 influenza, complicated by aspiration pna, PEA arrest x2 with ROS, PE s/p tPA, ARF requiring HD, s/p trach and PEG tube presents with bradycardia and EKG concerning for arrythmia 2/2 medication side effect vs hyperkalemia.     # bradycardia, junctional rhythm and peaked T waves  - resolved, cont BB, asa  - stop hydralazine for rash, clonidine patch if necessary  - hydrocortisone topical, benadryl prn as needed for itchiness    # s/p tracheostomy for chronic hypoxemic respiratory failure  - appreciate ENT and pulm recs  - sp decannulation    # Back abcess s/p I&D  - appreciate ID recs, observe off ABX    # DM  - appreciate endo recs    # RUSS on CKD resolved  - Strict I&Os,   - renally dose all meds, avoid nephrotoxins     # PEG tube removal  - appreciate GI recs    # PPX  - heparin subQ    plan of care dw patient and  at bedside, and NP  discharge planning

## 2018-05-22 VITALS
OXYGEN SATURATION: 100 % | SYSTOLIC BLOOD PRESSURE: 144 MMHG | DIASTOLIC BLOOD PRESSURE: 75 MMHG | TEMPERATURE: 99 F | HEART RATE: 89 BPM | RESPIRATION RATE: 18 BRPM

## 2018-05-22 LAB
GLUCOSE BLDC GLUCOMTR-MCNC: 186 MG/DL — HIGH (ref 70–99)
GLUCOSE BLDC GLUCOMTR-MCNC: 207 MG/DL — HIGH (ref 70–99)

## 2018-05-22 PROCEDURE — 87040 BLOOD CULTURE FOR BACTERIA: CPT

## 2018-05-22 PROCEDURE — 36556 INSERT NON-TUNNEL CV CATH: CPT

## 2018-05-22 PROCEDURE — 82009 KETONE BODYS QUAL: CPT

## 2018-05-22 PROCEDURE — 85014 HEMATOCRIT: CPT

## 2018-05-22 PROCEDURE — 82024 ASSAY OF ACTH: CPT

## 2018-05-22 PROCEDURE — 87205 SMEAR GRAM STAIN: CPT

## 2018-05-22 PROCEDURE — 84132 ASSAY OF SERUM POTASSIUM: CPT

## 2018-05-22 PROCEDURE — 96374 THER/PROPH/DIAG INJ IV PUSH: CPT

## 2018-05-22 PROCEDURE — 71045 X-RAY EXAM CHEST 1 VIEW: CPT

## 2018-05-22 PROCEDURE — C1751: CPT

## 2018-05-22 PROCEDURE — 82533 TOTAL CORTISOL: CPT

## 2018-05-22 PROCEDURE — 83880 ASSAY OF NATRIURETIC PEPTIDE: CPT

## 2018-05-22 PROCEDURE — 82553 CREATINE MB FRACTION: CPT

## 2018-05-22 PROCEDURE — 82330 ASSAY OF CALCIUM: CPT

## 2018-05-22 PROCEDURE — 97162 PT EVAL MOD COMPLEX 30 MIN: CPT

## 2018-05-22 PROCEDURE — 99291 CRITICAL CARE FIRST HOUR: CPT | Mod: 25

## 2018-05-22 PROCEDURE — 82010 KETONE BODYS QUAN: CPT

## 2018-05-22 PROCEDURE — 85610 PROTHROMBIN TIME: CPT

## 2018-05-22 PROCEDURE — 94640 AIRWAY INHALATION TREATMENT: CPT

## 2018-05-22 PROCEDURE — 85730 THROMBOPLASTIN TIME PARTIAL: CPT

## 2018-05-22 PROCEDURE — 97116 GAIT TRAINING THERAPY: CPT

## 2018-05-22 PROCEDURE — 97110 THERAPEUTIC EXERCISES: CPT

## 2018-05-22 PROCEDURE — 85027 COMPLETE CBC AUTOMATED: CPT

## 2018-05-22 PROCEDURE — 92610 EVALUATE SWALLOWING FUNCTION: CPT

## 2018-05-22 PROCEDURE — 84484 ASSAY OF TROPONIN QUANT: CPT

## 2018-05-22 PROCEDURE — 84295 ASSAY OF SERUM SODIUM: CPT

## 2018-05-22 PROCEDURE — 82947 ASSAY GLUCOSE BLOOD QUANT: CPT

## 2018-05-22 PROCEDURE — 82565 ASSAY OF CREATININE: CPT

## 2018-05-22 PROCEDURE — 97602 WOUND(S) CARE NON-SELECTIVE: CPT

## 2018-05-22 PROCEDURE — 84100 ASSAY OF PHOSPHORUS: CPT

## 2018-05-22 PROCEDURE — 49465 FLUORO EXAM OF G/COLON TUBE: CPT

## 2018-05-22 PROCEDURE — 83036 HEMOGLOBIN GLYCOSYLATED A1C: CPT

## 2018-05-22 PROCEDURE — 97530 THERAPEUTIC ACTIVITIES: CPT

## 2018-05-22 PROCEDURE — 82550 ASSAY OF CK (CPK): CPT

## 2018-05-22 PROCEDURE — 80048 BASIC METABOLIC PNL TOTAL CA: CPT

## 2018-05-22 PROCEDURE — 82962 GLUCOSE BLOOD TEST: CPT

## 2018-05-22 PROCEDURE — 82435 ASSAY OF BLOOD CHLORIDE: CPT

## 2018-05-22 PROCEDURE — 80053 COMPREHEN METABOLIC PANEL: CPT

## 2018-05-22 PROCEDURE — 93005 ELECTROCARDIOGRAM TRACING: CPT | Mod: 76,XU

## 2018-05-22 PROCEDURE — 83735 ASSAY OF MAGNESIUM: CPT

## 2018-05-22 PROCEDURE — 96375 TX/PRO/DX INJ NEW DRUG ADDON: CPT | Mod: XU

## 2018-05-22 PROCEDURE — 81003 URINALYSIS AUTO W/O SCOPE: CPT

## 2018-05-22 PROCEDURE — 87070 CULTURE OTHR SPECIMN AEROBIC: CPT

## 2018-05-22 PROCEDURE — 82803 BLOOD GASES ANY COMBINATION: CPT

## 2018-05-22 PROCEDURE — 83605 ASSAY OF LACTIC ACID: CPT

## 2018-05-22 PROCEDURE — 84443 ASSAY THYROID STIM HORMONE: CPT

## 2018-05-22 PROCEDURE — 96376 TX/PRO/DX INJ SAME DRUG ADON: CPT | Mod: XU

## 2018-05-22 PROCEDURE — 92526 ORAL FUNCTION THERAPY: CPT

## 2018-05-22 RX ORDER — FERROUS SULFATE 325(65) MG
1 TABLET ORAL
Qty: 0 | Refills: 0 | COMMUNITY

## 2018-05-22 RX ORDER — SEVELAMER CARBONATE 2400 MG/1
1 POWDER, FOR SUSPENSION ORAL
Qty: 0 | Refills: 0 | COMMUNITY

## 2018-05-22 RX ORDER — INSULIN LISPRO 100/ML
7 VIAL (ML) SUBCUTANEOUS
Qty: 1 | Refills: 0
Start: 2018-05-22 | End: 2018-06-20

## 2018-05-22 RX ORDER — SENNA PLUS 8.6 MG/1
10 TABLET ORAL
Qty: 0 | Refills: 0 | COMMUNITY

## 2018-05-22 RX ADMIN — Medication 81 MILLIGRAM(S): at 12:24

## 2018-05-22 RX ADMIN — CARVEDILOL PHOSPHATE 12.5 MILLIGRAM(S): 80 CAPSULE, EXTENDED RELEASE ORAL at 06:25

## 2018-05-22 RX ADMIN — Medication 7 UNIT(S): at 08:20

## 2018-05-22 RX ADMIN — Medication 3 MILLILITER(S): at 06:25

## 2018-05-22 RX ADMIN — Medication 2: at 12:24

## 2018-05-22 RX ADMIN — Medication 3 MILLILITER(S): at 12:23

## 2018-05-22 RX ADMIN — Medication 1 PATCH: at 12:24

## 2018-05-22 RX ADMIN — Medication 3 MILLILITER(S): at 02:24

## 2018-05-22 RX ADMIN — HEPARIN SODIUM 5000 UNIT(S): 5000 INJECTION INTRAVENOUS; SUBCUTANEOUS at 06:25

## 2018-05-22 RX ADMIN — Medication 7 UNIT(S): at 12:32

## 2018-05-22 RX ADMIN — Medication 1: at 08:20

## 2018-05-22 NOTE — PROGRESS NOTE ADULT - SUBJECTIVE AND OBJECTIVE BOX
Follow-up Pulm Progress Note  Caleb Mon MD  [phone]    AFebrile, hemodynamically stable  s/p decannulation on 5/18  O2 sats 94% on RA, breathing and vocalizing without difficulty  Stable resp status    Vital Signs Last 24 Hrs  T(C): 37.2 (22 May 2018 11:28), Max: 37.2 (22 May 2018 11:28)  T(F): 99 (22 May 2018 11:28), Max: 99 (22 May 2018 11:28)  HR: 89 (22 May 2018 11:28) (82 - 89)  BP: 144/75 (22 May 2018 11:28) (139/76 - 168/84)  BP(mean): --  RR: 18 (22 May 2018 11:28) (18 - 18)  SpO2: 100% (22 May 2018 11:28) (96% - 100%)  05-21    132<L>  |  100  |  12  ----------------------------<  192<H>  4.9   |  23  |  0.76    Ca    9.2      21 May 2018 06:51      CULTURES:  Culture Results:   No growth (05-14 @ 00:41)  Culture Results:   No growth at 5 days. (05-12 @ 05:40)  Culture Results:   No growth at 5 days. (05-12 @ 05:40)    Most recent blood culture -- 05-14 @ 00:41   -- -- .Abscess back 05-14 @ 00:41      Physical Examination:  PULM: No wheeze or rhonchi; few basilar crackles  CVS: Regular rate and rhythm, no murmurs, rubs, or gallops  ABD: Soft, non-tender  EXT:  No clubbing, cyanosis, or edema    RADIOLOGY REVIEWED  CXR:    CT chest:    TTE:

## 2018-05-22 NOTE — PROGRESS NOTE ADULT - PROVIDER SPECIALTY LIST ADULT
Cardiology
ENT
Endocrinology
Internal Medicine
MICU
MICU
Pulmonology
Cardiology
Cardiology
Internal Medicine
Pulmonology
Infectious Disease

## 2018-05-22 NOTE — PROGRESS NOTE ADULT - NSHPATTENDINGPLANDISCUSS_GEN_ALL_CORE
, patient, ID, medical team
pt, , medicine np
pt, , medicine np
pt and

## 2018-05-22 NOTE — PROGRESS NOTE ADULT - SUBJECTIVE AND OBJECTIVE BOX
CARDIOLOGY FOLLOW UP NOTE - DR. DAMION ROSENBERG    Patient states no new complaints.    MEDICATIONS  (STANDING):  ALBUTerol/ipratropium for Nebulization 3 milliLiter(s) Nebulizer every 4 hours  aspirin  chewable 81 milliGRAM(s) Oral daily  BACItracin   Ointment 1 Application(s) Topical daily  carvedilol 12.5 milliGRAM(s) Oral every 12 hours  cloNIDine Patch 0.1 mG/24Hr(s) 1 patch Topical every 7 days  clotrimazole 1% Cream 1 Application(s) Topical two times a day  dextrose 5%. 1000 milliLiter(s) (50 mL/Hr) IV Continuous <Continuous>  dextrose 50% Injectable 12.5 Gram(s) IV Push once  dextrose 50% Injectable 25 Gram(s) IV Push once  dextrose 50% Injectable 25 Gram(s) IV Push once  heparin  Injectable 5000 Unit(s) SubCutaneous every 8 hours  insulin glargine Injectable (LANTUS) 15 Unit(s) SubCutaneous at bedtime  insulin lispro (HumaLOG) corrective regimen sliding scale   SubCutaneous at bedtime  insulin lispro (HumaLOG) corrective regimen sliding scale   SubCutaneous three times a day before meals  insulin lispro Injectable (HumaLOG) 7 Unit(s) SubCutaneous three times a day with meals        PHYSICAL EXAM:  Vital Signs Last 24 Hrs  T(C): 36.8 (22 May 2018 04:14), Max: 36.8 (22 May 2018 04:14)  T(F): 98.3 (22 May 2018 04:14), Max: 98.3 (22 May 2018 04:14)  HR: 85 (22 May 2018 04:14) (81 - 85)  BP: 139/76 (22 May 2018 04:14) (133/79 - 168/84)  BP(mean): --  RR: 18 (22 May 2018 04:14) (18 - 18)  SpO2: 96% (22 May 2018 04:14) (96% - 98%)  I&O's Summary      Telemetry: no longer on telemetry    General: NAD	  HEENT:   No JVD	  Cardiovascular: RRR  Extremities: No edema      	    LABS:      05-21    132<L>  |  100  |  12  ----------------------------<  192<H>  4.9   |  23  |  0.76    Ca    9.2      21 May 2018 06:51      HEALTH ISSUES - PROBLEM Dx:  Tracheostomy in place: Tracheostomy in place  Back abscess: Back abscess  Hypertension, unspecified type: Hypertension, unspecified type  Type 2 diabetes mellitus with hyperglycemia, with long-term current use of insulin: Type 2 diabetes mellitus with hyperglycemia, with long-term current use of insulin  Hyperkalemia: Hyperkalemia        Assessment and Plan:  67 y/o F w/ hx of DM2, HTN, breast CA (dx in 2/2017), with recent hospitalization for respiratory arrest 2/2 influenza, complicated by aspiration PNA, PEA arrest x2, PE s/p tPA, ARF requiring temporary HD, s/p trach and PEG tube, who presented with bradycardia  1. Bradycardia - pt had a sinus node arrest with a junctional escape rhythm, in the setting of hyperkalemia and two AV juanita blockers. I suspect that the hyperkalemia played a larger role than the combination of carvedilol and cardizem. Unclear etiology of hyperkalemia (maybe due to ramipril), although she did present with an RUSS as well, with elevated creatinine, which had resolved very quickly (although the increased creatinine could also have been due to poor renal perfusion in the setting of significant bradycardia). As soon as hyperkalemia was treated, her sinus node kicked-in. Continue carvedilol at 12.5mg bid.  2. HTN - elevated BP. A generalized rash was noted after starting hydralazine. Coreg is not controlling BP well enough on its own. The patient had reaction to nifedipine in the past, and was hospitalized within a few days of starting diltiazem. Since presenting hyperkalemia may have been related to ACE-I, will avoid ACE/ARB. Will add low dose clonidine patch.

## 2018-05-30 ENCOUNTER — APPOINTMENT (OUTPATIENT)
Dept: SURGERY | Facility: ASSISTED LIVING FACILITY | Age: 68
End: 2018-05-30
Payer: MEDICARE

## 2018-05-30 PROCEDURE — 99304 1ST NF CARE SF/LOW MDM 25: CPT

## 2018-05-31 PROBLEM — I63.9 CEREBRAL INFARCTION, UNSPECIFIED: Chronic | Status: ACTIVE | Noted: 2018-05-11

## 2018-05-31 PROBLEM — I46.9 CARDIAC ARREST, CAUSE UNSPECIFIED: Chronic | Status: ACTIVE | Noted: 2018-05-11

## 2018-06-06 ENCOUNTER — APPOINTMENT (OUTPATIENT)
Dept: SURGERY | Facility: ASSISTED LIVING FACILITY | Age: 68
End: 2018-06-06
Payer: MEDICARE

## 2018-06-06 PROCEDURE — 99307 SBSQ NF CARE SF MDM 10: CPT

## 2018-06-20 ENCOUNTER — APPOINTMENT (OUTPATIENT)
Dept: SURGERY | Facility: ASSISTED LIVING FACILITY | Age: 68
End: 2018-06-20
Payer: MEDICARE

## 2018-06-20 PROCEDURE — 99307 SBSQ NF CARE SF MDM 10: CPT

## 2018-06-27 ENCOUNTER — APPOINTMENT (OUTPATIENT)
Dept: SURGERY | Facility: ASSISTED LIVING FACILITY | Age: 68
End: 2018-06-27
Payer: MEDICARE

## 2018-06-27 PROCEDURE — 99307 SBSQ NF CARE SF MDM 10: CPT

## 2018-07-02 ENCOUNTER — APPOINTMENT (OUTPATIENT)
Dept: SURGERY | Facility: ASSISTED LIVING FACILITY | Age: 68
End: 2018-07-02
Payer: MEDICARE

## 2018-07-02 PROCEDURE — 99307 SBSQ NF CARE SF MDM 10: CPT

## 2018-07-11 ENCOUNTER — APPOINTMENT (OUTPATIENT)
Dept: MRI IMAGING | Facility: HOSPITAL | Age: 68
End: 2018-07-11

## 2018-07-12 ENCOUNTER — MESSAGE (OUTPATIENT)
Age: 68
End: 2018-07-12

## 2018-07-19 ENCOUNTER — APPOINTMENT (OUTPATIENT)
Dept: ENDOCRINOLOGY | Facility: CLINIC | Age: 68
End: 2018-07-19
Payer: MEDICARE

## 2018-07-19 VITALS — DIASTOLIC BLOOD PRESSURE: 70 MMHG | OXYGEN SATURATION: 98 % | SYSTOLIC BLOOD PRESSURE: 130 MMHG | HEART RATE: 71 BPM

## 2018-07-19 PROCEDURE — 99215 OFFICE O/P EST HI 40 MIN: CPT

## 2018-07-19 RX ORDER — OMEPRAZOLE 20 MG/1
20 CAPSULE, DELAYED RELEASE ORAL DAILY
Qty: 30 | Refills: 0 | Status: DISCONTINUED | COMMUNITY
Start: 2017-08-31 | End: 2018-07-19

## 2018-07-19 RX ORDER — SILVER SULFADIAZINE 10 MG/G
1 CREAM TOPICAL
Qty: 400 | Refills: 0 | Status: DISCONTINUED | COMMUNITY
Start: 2017-12-04 | End: 2018-07-19

## 2018-07-25 ENCOUNTER — CLINICAL ADVICE (OUTPATIENT)
Age: 68
End: 2018-07-25

## 2018-08-02 ENCOUNTER — MEDICATION RENEWAL (OUTPATIENT)
Age: 68
End: 2018-08-02

## 2018-08-02 RX ORDER — METFORMIN HYDROCHLORIDE 500 MG/1
500 TABLET, EXTENDED RELEASE ORAL DAILY
Qty: 180 | Refills: 1 | Status: DISCONTINUED | COMMUNITY
Start: 1900-01-01 | End: 2018-08-02

## 2018-09-04 ENCOUNTER — APPOINTMENT (OUTPATIENT)
Dept: OBGYN | Facility: CLINIC | Age: 68
End: 2018-09-04

## 2018-09-24 ENCOUNTER — RX RENEWAL (OUTPATIENT)
Age: 68
End: 2018-09-24

## 2018-11-15 ENCOUNTER — APPOINTMENT (OUTPATIENT)
Dept: ENDOCRINOLOGY | Facility: CLINIC | Age: 68
End: 2018-11-15
Payer: MEDICARE

## 2018-11-15 VITALS
BODY MASS INDEX: 30.24 KG/M2 | WEIGHT: 150 LBS | HEIGHT: 59 IN | OXYGEN SATURATION: 97 % | HEART RATE: 71 BPM | SYSTOLIC BLOOD PRESSURE: 140 MMHG | DIASTOLIC BLOOD PRESSURE: 60 MMHG

## 2018-11-15 DIAGNOSIS — M81.8 OTHER OSTEOPOROSIS W/OUT CURRENT PATHOLOGICAL FRACTURE: ICD-10-CM

## 2018-11-15 DIAGNOSIS — M81.0 AGE-RELATED OSTEOPOROSIS W/OUT CURRENT PATHOLOGICAL FRACTURE: ICD-10-CM

## 2018-11-15 DIAGNOSIS — T38.6X5A OTHER OSTEOPOROSIS W/OUT CURRENT PATHOLOGICAL FRACTURE: ICD-10-CM

## 2018-11-15 DIAGNOSIS — S62.101D FRACTURE OF UNSPECIFIED CARPAL BONE, RIGHT WRIST, SUBSEQUENT ENCOUNTER FOR FRACTURE WITH ROUTINE HEALING: ICD-10-CM

## 2018-11-15 LAB
GLUCOSE BLDC GLUCOMTR-MCNC: 223
HBA1C MFR BLD HPLC: 6.5

## 2018-11-15 PROCEDURE — 77085 DXA BONE DENSITY AXL VRT FX: CPT | Mod: GA

## 2018-11-15 PROCEDURE — 82962 GLUCOSE BLOOD TEST: CPT

## 2018-11-15 PROCEDURE — 83036 HEMOGLOBIN GLYCOSYLATED A1C: CPT | Mod: QW

## 2018-11-15 PROCEDURE — 99215 OFFICE O/P EST HI 40 MIN: CPT | Mod: 25

## 2018-11-15 PROCEDURE — G0008: CPT

## 2018-11-15 PROCEDURE — 90686 IIV4 VACC NO PRSV 0.5 ML IM: CPT

## 2018-11-15 RX ORDER — FERROUS SULFATE 325(65) MG
325 (65 FE) TABLET ORAL DAILY
Qty: 30 | Refills: 0 | Status: DISCONTINUED | COMMUNITY
Start: 2017-12-28 | End: 2018-11-15

## 2018-11-15 RX ORDER — ERGOCALCIFEROL 1.25 MG/1
1.25 MG CAPSULE, LIQUID FILLED ORAL
Qty: 6 | Refills: 0 | Status: DISCONTINUED | COMMUNITY
Start: 2017-09-01 | End: 2018-11-15

## 2018-11-15 RX ORDER — ECONAZOLE NITRATE 10 MG/G
1 CREAM TOPICAL TWICE DAILY
Qty: 90 | Refills: 3 | Status: DISCONTINUED | COMMUNITY
Start: 2017-08-31 | End: 2018-11-15

## 2018-11-15 RX ORDER — CLONIDINE 0.2 MG/24H
0.2 PATCH, EXTENDED RELEASE TRANSDERMAL
Qty: 4 | Refills: 0 | Status: DISCONTINUED | COMMUNITY
Start: 2018-07-12 | End: 2018-11-15

## 2018-11-15 RX ORDER — HYDROCORTISONE 10 MG/G
1 CREAM TOPICAL
Qty: 1 | Refills: 0 | Status: ACTIVE | COMMUNITY
Start: 2018-11-15

## 2018-11-15 RX ORDER — FUROSEMIDE 20 MG/1
20 TABLET ORAL
Qty: 30 | Refills: 0 | Status: DISCONTINUED | COMMUNITY
Start: 2017-08-31 | End: 2018-11-15

## 2018-11-15 RX ORDER — ONDANSETRON 8 MG/1
8 TABLET ORAL
Qty: 20 | Refills: 0 | Status: DISCONTINUED | COMMUNITY
Start: 2017-08-02 | End: 2018-11-15

## 2018-11-15 RX ORDER — FLASH GLUCOSE SENSOR
KIT MISCELLANEOUS
Qty: 3 | Refills: 11 | Status: DISCONTINUED | COMMUNITY
Start: 2017-12-28 | End: 2018-11-15

## 2018-11-15 RX ORDER — ONDANSETRON 8 MG/1
8 TABLET, ORALLY DISINTEGRATING ORAL
Qty: 30 | Refills: 0 | Status: DISCONTINUED | COMMUNITY
Start: 2017-07-26 | End: 2018-11-15

## 2018-11-15 RX ORDER — FLASH GLUCOSE SENSOR
KIT MISCELLANEOUS
Qty: 1 | Refills: 0 | Status: DISCONTINUED | COMMUNITY
Start: 2017-12-28 | End: 2018-11-15

## 2018-11-15 RX ORDER — NYSTATIN 100000 [USP'U]/G
100000 POWDER TOPICAL AS DIRECTED
Qty: 1 | Refills: 0 | Status: DISCONTINUED | COMMUNITY
Start: 2017-12-28 | End: 2018-11-15

## 2018-12-03 NOTE — PROGRESS NOTE ADULT - PROBLEM SELECTOR PLAN 1
Per refill protocol, needs to be referred to MD   -test BG Q6H  -c/w Humalog moderate correction scale Q6h, may add on NPH if needed  -IF BG >180 while NPO-can consider starting low dose Lantus 10 units Daily  -Please page [phone] with 10 digit call back number if any questions.   ON SERVICE 3/16/2018-3/18/2018  For after hours or weekends please call [phone] or page fellow on call.

## 2019-02-11 ENCOUNTER — RX RENEWAL (OUTPATIENT)
Age: 69
End: 2019-02-11

## 2019-04-25 NOTE — PATIENT PROFILE ADULT. - HEALTHCARE INFORMATION NEEDED, PROFILE
none Mauc Instructions: By selecting yes to the question below the MAUC number will be added into the note.  This will be calculated automatically based on the diagnosis chosen, the size entered, the body zone selected (H,M,L) and the specific indications you chose. You will also have the option to override the Mohs AUC if you disagree with the automatically calculated number and this option is found in the Case Summary tab.

## 2019-05-06 ENCOUNTER — MEDICATION RENEWAL (OUTPATIENT)
Age: 69
End: 2019-05-06

## 2019-05-08 RX ORDER — BLOOD-GLUCOSE METER
W/DEVICE KIT MISCELLANEOUS
Qty: 1 | Refills: 0 | Status: ACTIVE | COMMUNITY
Start: 2019-05-07

## 2019-05-09 ENCOUNTER — APPOINTMENT (OUTPATIENT)
Dept: ENDOCRINOLOGY | Facility: CLINIC | Age: 69
End: 2019-05-09
Payer: MEDICARE

## 2019-05-09 ENCOUNTER — RX RENEWAL (OUTPATIENT)
Age: 69
End: 2019-05-09

## 2019-05-09 VITALS
HEART RATE: 78 BPM | SYSTOLIC BLOOD PRESSURE: 120 MMHG | BODY MASS INDEX: 26.21 KG/M2 | WEIGHT: 130 LBS | HEIGHT: 59 IN | DIASTOLIC BLOOD PRESSURE: 80 MMHG | OXYGEN SATURATION: 96 %

## 2019-05-09 DIAGNOSIS — Z80.49 FAMILY HISTORY OF MALIGNANT NEOPLASM OF OTHER GENITAL ORGANS: ICD-10-CM

## 2019-05-09 LAB — HBA1C MFR BLD HPLC: 6.3

## 2019-05-09 PROCEDURE — 83036 HEMOGLOBIN GLYCOSYLATED A1C: CPT | Mod: QW

## 2019-05-09 PROCEDURE — 82962 GLUCOSE BLOOD TEST: CPT

## 2019-05-09 PROCEDURE — 99215 OFFICE O/P EST HI 40 MIN: CPT | Mod: 25

## 2019-05-09 NOTE — PHYSICAL EXAM
[Alert] : alert [No Acute Distress] : no acute distress [Normal Rate and Effort] : normal respiratory rhythm and effort [No Respiratory Distress] : no respiratory distress [Clear to Auscultation] : lungs were clear to auscultation bilaterally [Normal S1, S2] : normal S1 and S2 [Normal Rate] : heart rate was normal  [Regular Rhythm] : with a regular rhythm [Normal Bowel Sounds] : normal bowel sounds [Not Tender] : non-tender [Not Distended] : not distended [Right Foot Was Examined] : right foot ~C was examined [Soft] : abdomen soft [Left Foot Was Examined] : left foot ~C was examined [2+] : 2+ in the posterior tibialis [Normal] : normal [Normal Sclera/Conjunctiva] : normal sclera/conjunctiva [#1 Diminished] : number 1 was normal [No Proptosis] : no proptosis [#3 Diminished] : number 3 was normal [#2 Diminished] : number 2 was normal [#4 Diminished] : number 4 was normal [#5 Diminished] : number 5 was normal [#6 Diminished] : number 6 was normal [#8 Diminished] : number 8 was normal [#7 Diminished] : number 7 was normal [#9 Diminished] : number 9 was normal [#10 Diminished] : number 10 was normal

## 2019-05-09 NOTE — HISTORY OF PRESENT ILLNESS
[FreeTextEntry1] : 68 yo female with hx of t2dm controlled (HbA1c 6.3%) with retinopathy, nephropathy and neuropathy being managed with insulin 70/30 regular: 15 units sq qam and 8 units qpm, as well as metformin ER 750mg po bid.  \par She is still on letrozole and has PHPT so she is a risk for fx, but she refuses to go to the dentist - "my teeth are ok". She stopped PT for the winter. No falls. She can now walk with a walker/assist but mostly uses a wheelchair. \par She is still under the care of both optho and retinal specialist. She was last seen in December - had eylea injection in R eye only. She continues to have b/l blurred vision - r eye is worse. No diplopia. \par BS scanned in chart.\par \par PCP: Dr. Capri Horne, [facility] [phone] (t), [phone] (f)\par Dr. Yossi Shi, optho\par Dr. Mustapha Hu, retinal specialist\par Dr. Kennedy Norton, cardiology [phone]\par Dr. Aneta Lugo, surgical oncology\par Dr. Silver Campuzano, medical oncology\par Dr. Geremias Lamb, GI

## 2019-05-09 NOTE — ASSESSMENT
[FreeTextEntry1] : 70 yo with hx of t2dm controlled with neuropathy, microalbuminuria and retinopathy here for f/u.\par 1) T2DM: bs testing bid.\par Continue insulin 70/30 humulin 15 units sq qam and 8 units sq qpm\par Metformin ER 750mg po bid\par 2) Hx R wrist fracture/OP/PHPT/letrazole tx: \par Avoid thiazide diuretics.\par She saw her dentist Dr. Lionel Mckinnon 10/17, who recommended that she have teeth 1, 17 and 32 extracted and to see the periodontist. The pt initially refused as she feels that her teeth are not bothering her. She had the extractions but still did not see the periodontist.. I explained that she is of great risk of fracture and I cannot treat due to the risk of ONJ if she does not complete her dental work..\par BMD done 11/18.\par Vit D supplement.\par 3) HTN: At goal, less than 140/80 (based on her age). Continue coreg and cardura.\par 4) Dyslipidemia:  Atorvastatin tx.\par Return in 4 months\par Send note to oncologist Dr. Campuzano and PCP Dr. Horne.\par Spent 40 minutes coordinating care for this complex patient.

## 2019-05-13 ENCOUNTER — RX RENEWAL (OUTPATIENT)
Age: 69
End: 2019-05-13

## 2019-06-04 NOTE — PROGRESS NOTE ADULT - ASSESSMENT
66 yo F with Breast cancer, HTN presented with the flu, went into respiratory arrest, then PEA arrest, s/p chest tube, TPA and intubation.   Patient now with shock liver, RUSS pneumoperitoneum, elevated cardiac enzymes rolling walker

## 2019-06-10 ENCOUNTER — OUTPATIENT (OUTPATIENT)
Dept: OUTPATIENT SERVICES | Facility: HOSPITAL | Age: 69
LOS: 1 days | End: 2019-06-10
Payer: MEDICARE

## 2019-06-10 ENCOUNTER — APPOINTMENT (OUTPATIENT)
Dept: RADIOLOGY | Facility: HOSPITAL | Age: 69
End: 2019-06-10

## 2019-06-10 DIAGNOSIS — Z00.00 ENCOUNTER FOR GENERAL ADULT MEDICAL EXAMINATION WITHOUT ABNORMAL FINDINGS: ICD-10-CM

## 2019-06-10 DIAGNOSIS — R91.8 OTHER NONSPECIFIC ABNORMAL FINDING OF LUNG FIELD: ICD-10-CM

## 2019-06-10 DIAGNOSIS — R10.9 UNSPECIFIED ABDOMINAL PAIN: ICD-10-CM

## 2019-06-10 DIAGNOSIS — Z98.890 OTHER SPECIFIED POSTPROCEDURAL STATES: Chronic | ICD-10-CM

## 2019-06-10 PROCEDURE — 74241: CPT

## 2019-06-10 PROCEDURE — 71250 CT THORAX DX C-: CPT

## 2019-06-17 ENCOUNTER — OUTPATIENT (OUTPATIENT)
Dept: OUTPATIENT SERVICES | Facility: HOSPITAL | Age: 69
LOS: 1 days | End: 2019-06-17
Payer: MEDICARE

## 2019-06-17 DIAGNOSIS — R13.10 DYSPHAGIA, UNSPECIFIED: ICD-10-CM

## 2019-06-17 DIAGNOSIS — Z98.890 OTHER SPECIFIED POSTPROCEDURAL STATES: Chronic | ICD-10-CM

## 2019-06-17 PROCEDURE — 74230 X-RAY XM SWLNG FUNCJ C+: CPT | Mod: 26

## 2019-06-17 PROCEDURE — 92611 MOTION FLUOROSCOPY/SWALLOW: CPT

## 2019-06-17 PROCEDURE — 74230 X-RAY XM SWLNG FUNCJ C+: CPT

## 2019-07-10 ENCOUNTER — RX RENEWAL (OUTPATIENT)
Age: 69
End: 2019-07-10

## 2019-07-17 NOTE — CONSULT NOTE ADULT - ATTENDING COMMENTS
ARF following PEA arrest  1.  ARF--oligoanuric.  Lytes, acidosis, volume status not requiring metabolic optimization at this time. If hemodynamics improve might be IHD candidate.  CVVHDF orders completed and consent given,, Decision to use overnight can be reviewed with me  2.  Hypotension--pressors.  Initially severe enough to provoke shock liver (if CVVHDF would NOT use citrate)  3.  Respiratory failure, acute--relatively good compliance FIO2 not requiring immediate CVVHDF volume removal
consulted for free air.  The patient had chest x ray prior to arrest and there was no free air  This is iatragenic bi lateral pneumothorax, extensive subcutaneous emphysema and free air secondary to arrest with chest compressions pneumothoracies and ambu bagging.  Unlikely to be from hollow viscus inside the abdomen.  The abdomen on my exam is benign although intubated and sedated  The patient was watched over a period of time and has not worsened  CT scan shows no free fluid.  contrast goes past stomach and duodenum into small bowel with no extravisation  The colon is decompressed and otherwise normal  The patient would be an extreme risk for non therapeutic laparotomy given recent extensive cardiac arrest  I have reviewed this case in detail with the MICU  Plan to continue close observation
patient s/p cardiac arrest and pigtail placement with questionable intraparenchymal pigtails bilateral - no tidaling no air leak - would remove all pigtails and not functional if in the parenchyma. if develops pneumothorax after removal recommend 24-28 Fr. caliber chest tube .   unlikely to bleed given low pressure system but repeat CXR after removal
Seen and examined with resident. Agree with note.   Patient with encephalopathy, gait dysfunction.  Patient will need acute TBI rehabilitation when stable.
GI consulted for suggestions regarding management of pneumoperitoneum.    Etiology unclear, as patient had critical illness requiring multiple interventions, including ETT tube placement/malposition into esophagus/reintubation with malposition into right mainstem bronchus/subsequently corrected, CPR/ACLS with chest compressions, thrombolytic administration, placement of chest tubes, placement of a peritoneal drain to prevent abdominal compartment syndrome.    Despite pneumoperitoneum on CXR/CT scans, the patients abdominal exam demonstrates no rigidity/guarding that would suggest peritonitis/perforated viscus.    Mild dilation of a short segment of small bowel without obstruction on CT scan of unclear etiology.    Recommendations:    #1.  Unless patient able to swallow, would be difficult to obtain meaningful images of the pharynx or esophagus.  Primary MICU team to discuss with CT surgery.  #2.  To rule out perforated stomach / small bowel / colon, would consider CT abd/pelvis with PO gastrograffin administered via NG/OG tube.
Ms. Barraza is a 66 y/o woman w/ hx Breast Cancer (diagnosed in Feb 2017) currently on Herceptin, HTN, DM II on 70/30 p/w fever, RVP positive for flu on 1/30. PEA arrest with chest compressions for 10min, received ROSC after Epi x 2, intubated, requiring re-intubation s/p recurrent PEA w/ ROSC after 30 mins. neurological exam under sedation revealed spontaneous movement of the extremities but no spontaneous eye opening.  CT head significant for left PCA infarct, suspect.   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
Critically ill on vent with aspiration PNA/hypotension  Frequent bedside visits with therapy change today. Crit Care Time Today 35 min +
Agree with assessment and plan as above by Dr. English. Reviewed all pertinent labs, glucose values, and imaging studies. Modifications made as indicated above.     Andi Low D.O  [phone]
Labs/Medications/Imaging Studies

## 2019-08-24 ENCOUNTER — EMERGENCY (EMERGENCY)
Facility: HOSPITAL | Age: 69
LOS: 1 days | Discharge: HOME | End: 2019-08-24
Attending: EMERGENCY MEDICINE
Payer: MEDICARE

## 2019-08-24 VITALS
WEIGHT: 125 LBS | OXYGEN SATURATION: 99 % | SYSTOLIC BLOOD PRESSURE: 169 MMHG | RESPIRATION RATE: 18 BRPM | HEART RATE: 74 BPM | HEIGHT: 60 IN | DIASTOLIC BLOOD PRESSURE: 76 MMHG | TEMPERATURE: 98 F

## 2019-08-24 VITALS
HEART RATE: 84 BPM | OXYGEN SATURATION: 96 % | DIASTOLIC BLOOD PRESSURE: 54 MMHG | SYSTOLIC BLOOD PRESSURE: 204 MMHG | RESPIRATION RATE: 20 BRPM

## 2019-08-24 DIAGNOSIS — Z98.890 OTHER SPECIFIED POSTPROCEDURAL STATES: Chronic | ICD-10-CM

## 2019-08-24 LAB
ALBUMIN SERPL ELPH-MCNC: 3.4 G/DL — SIGNIFICANT CHANGE UP (ref 3.3–5)
ALP SERPL-CCNC: 86 U/L — SIGNIFICANT CHANGE UP (ref 40–120)
ALT FLD-CCNC: 23 U/L — SIGNIFICANT CHANGE UP (ref 10–45)
ANION GAP SERPL CALC-SCNC: 17 MMOL/L — SIGNIFICANT CHANGE UP (ref 5–17)
ANISOCYTOSIS BLD QL: SLIGHT — SIGNIFICANT CHANGE UP
AST SERPL-CCNC: 30 U/L — SIGNIFICANT CHANGE UP (ref 10–40)
BASOPHILS # BLD AUTO: 0 K/UL — SIGNIFICANT CHANGE UP (ref 0–0.2)
BASOPHILS NFR BLD AUTO: 0.4 % — SIGNIFICANT CHANGE UP (ref 0–2)
BILIRUB SERPL-MCNC: 0.2 MG/DL — SIGNIFICANT CHANGE UP (ref 0.2–1.2)
BLD GP AB SCN SERPL QL: NEGATIVE — SIGNIFICANT CHANGE UP
BUN SERPL-MCNC: 21 MG/DL — SIGNIFICANT CHANGE UP (ref 7–23)
CALCIUM SERPL-MCNC: 10.1 MG/DL — SIGNIFICANT CHANGE UP (ref 8.4–10.5)
CHLORIDE SERPL-SCNC: 97 MMOL/L — SIGNIFICANT CHANGE UP (ref 96–108)
CO2 SERPL-SCNC: 22 MMOL/L — SIGNIFICANT CHANGE UP (ref 22–31)
CREAT SERPL-MCNC: 0.74 MG/DL — SIGNIFICANT CHANGE UP (ref 0.5–1.3)
EOSINOPHIL # BLD AUTO: 0.5 K/UL — SIGNIFICANT CHANGE UP (ref 0–0.5)
EOSINOPHIL NFR BLD AUTO: 4.6 % — SIGNIFICANT CHANGE UP (ref 0–6)
GLUCOSE SERPL-MCNC: 220 MG/DL — HIGH (ref 70–99)
HCT VFR BLD CALC: 26.1 % — LOW (ref 34.5–45)
HGB BLD-MCNC: 7.7 G/DL — LOW (ref 11.5–15.5)
HYPOCHROMIA BLD QL: SLIGHT — SIGNIFICANT CHANGE UP
LYMPHOCYTES # BLD AUTO: 1.5 K/UL — SIGNIFICANT CHANGE UP (ref 1–3.3)
LYMPHOCYTES # BLD AUTO: 12.8 % — LOW (ref 13–44)
MCHC RBC-ENTMCNC: 21.2 PG — LOW (ref 27–34)
MCHC RBC-ENTMCNC: 29.4 GM/DL — LOW (ref 32–36)
MCV RBC AUTO: 72.3 FL — LOW (ref 80–100)
MICROCYTES BLD QL: SLIGHT — SIGNIFICANT CHANGE UP
MONOCYTES # BLD AUTO: 0.6 K/UL — SIGNIFICANT CHANGE UP (ref 0–0.9)
MONOCYTES NFR BLD AUTO: 5.4 % — SIGNIFICANT CHANGE UP (ref 2–14)
NEUTROPHILS # BLD AUTO: 9.2 K/UL — HIGH (ref 1.8–7.4)
NEUTROPHILS NFR BLD AUTO: 76.8 % — SIGNIFICANT CHANGE UP (ref 43–77)
OB PNL STL: NEGATIVE — SIGNIFICANT CHANGE UP
PLAT MORPH BLD: NORMAL — SIGNIFICANT CHANGE UP
PLATELET # BLD AUTO: 370 K/UL — SIGNIFICANT CHANGE UP (ref 150–400)
POTASSIUM SERPL-MCNC: 5.6 MMOL/L — HIGH (ref 3.5–5.3)
POTASSIUM SERPL-SCNC: 5.6 MMOL/L — HIGH (ref 3.5–5.3)
PROT SERPL-MCNC: 7.9 G/DL — SIGNIFICANT CHANGE UP (ref 6–8.3)
RBC # BLD: 3.61 M/UL — LOW (ref 3.8–5.2)
RBC # FLD: 18.9 % — HIGH (ref 10.3–14.5)
RBC BLD AUTO: ABNORMAL
RH IG SCN BLD-IMP: POSITIVE — SIGNIFICANT CHANGE UP
SODIUM SERPL-SCNC: 136 MMOL/L — SIGNIFICANT CHANGE UP (ref 135–145)
WBC # BLD: 12 K/UL — HIGH (ref 3.8–10.5)
WBC # FLD AUTO: 12 K/UL — HIGH (ref 3.8–10.5)

## 2019-08-24 PROCEDURE — 71045 X-RAY EXAM CHEST 1 VIEW: CPT | Mod: 26

## 2019-08-24 PROCEDURE — 80053 COMPREHEN METABOLIC PANEL: CPT

## 2019-08-24 PROCEDURE — 85027 COMPLETE CBC AUTOMATED: CPT

## 2019-08-24 PROCEDURE — 86900 BLOOD TYPING SEROLOGIC ABO: CPT

## 2019-08-24 PROCEDURE — 93010 ELECTROCARDIOGRAM REPORT: CPT

## 2019-08-24 PROCEDURE — 71045 X-RAY EXAM CHEST 1 VIEW: CPT

## 2019-08-24 PROCEDURE — 82272 OCCULT BLD FECES 1-3 TESTS: CPT

## 2019-08-24 PROCEDURE — 86901 BLOOD TYPING SEROLOGIC RH(D): CPT

## 2019-08-24 PROCEDURE — 99284 EMERGENCY DEPT VISIT MOD MDM: CPT | Mod: GC

## 2019-08-24 PROCEDURE — 86850 RBC ANTIBODY SCREEN: CPT

## 2019-08-24 PROCEDURE — 93005 ELECTROCARDIOGRAM TRACING: CPT

## 2019-08-24 PROCEDURE — 99283 EMERGENCY DEPT VISIT LOW MDM: CPT | Mod: 25

## 2019-08-24 NOTE — ED PROVIDER NOTE - CARE PROVIDER_API CALL
Lashae Mejias (DO)  HematologyOncology; Internal Medicine  [address] [place] [address]  Phone: [phone]  Fax: [phone]  Follow Up Time:

## 2019-08-24 NOTE — ED PROVIDER NOTE - PHYSICAL EXAMINATION
Hpi Title: Evaluation of Skin Lesions Helen Leong, .:   GENERAL: Patient awake alert NAD.  HEENT: NC/AT, Moist mucous membranes, PERRL, EOMI.  LUNGS: CTAB, no wheezes or crackles.   CARDIAC: RRR, systolic murmur, no r/g.    ABDOMEN: Soft, NT, ND, No rebound, guarding. No CVA tenderness.   EXT: No edema. No calf tenderness. CV 2+DP/PT bilaterally.   MSK: No spinal tenderness, no pain with movement, no deformities.  NEURO: A&Ox3. Moving all extremities.  SKIN: Warm and dry. No rash. Pale.  PSYCH: Normal affect.

## 2019-08-24 NOTE — ED PROVIDER NOTE - CLINICAL SUMMARY MEDICAL DECISION MAKING FREE TEXT BOX
69 y old f with multiple medical issues with weakness ,fatigue and malaise with anemia ,will obtain blood work type screen and on reevaluation: ZR

## 2019-08-24 NOTE — ED CLERICAL - NS ED CLERK NOTE PRE-ARRIVAL INFORMATION; ADDITIONAL PRE-ARRIVAL INFORMATION
CC/Reason For referral: Hgb 7  Preferred Consultant(if applicable):  Who admits for you (if needed): [facility]  Do you have documents you would like to fax over? no  Would you still like to speak to an ED attending? call [facility] hospitalist on call

## 2019-08-24 NOTE — ED ADULT NURSE NOTE - OBJECTIVE STATEMENT
pt 68 yo female from home per family pt had blood drawn at routine check up found hgb to be at 7 pt hx breast cancer diabetes b/l mastectomy with nodes removed on right side per pt denies dizziness or chest pain fatigue and exap of generalized weakness pt on oral iron pills for anemia per family no nausea vomiting or diarrhea denies black stools pt ambulatory with walker at home per family

## 2019-08-24 NOTE — ED ADULT NURSE REASSESSMENT NOTE - NS ED NURSE REASSESS COMMENT FT1
pt and family spoke with Dr Leong pt doesent need transfusion at this time hgb7.7 guiac negative pt to follow up with hamatologist upon discharge instructions pts b/p elevated pt denies headache or dizziness Dr Herrmann nootified states ok for patient to take her cardura which daughter had daughter and pt declined to stay and retake b/p they have machine which they will check at home pt h/l removed and pt discharged via wheelchair with coopy of all results given to family

## 2019-08-24 NOTE — ED PROVIDER NOTE - OBJECTIVE STATEMENT
68F pmhx of DM2, HTN, breast CA (dx in 2/2017), w recent hospitalization for respiratory arrest 2/2 influenza, complicated by aspiration pna, PEA arrest x2 with ROSC, PE s/p tPA, ARF requiring HD, s/p trach and PEG tube presents with bradycardia and EKG concerning for arrythmia 2/2 medication side effect vs hyperkalemia. Patient presenting with Hgb of 7, sent to ER. 68F pmhx of DM2, HTN, breast CA (dx in 2/2017), w recent hospitalization for respiratory arrest 2/2 influenza, complicated by aspiration pna, PEA arrest x2 with ROSC, PE s/p tPA, ARF requiring HD, s/p trach and PEG tube presents with bradycardia and EKG concerning for arrythmia 2/2 medication side effect vs hyperkalemia. Patient presenting from PMD office for Hgb of 7, sent to ER. According to , patient is more lethargic, sleepy and weak. No fevers, chills, blood in stool.    PMD: Dr. Capri Mao - [facility] hx taken from old chart and  ,68F pmhx of DM2, HTN, breast CA (dx in 2/2017), w recent hospitalization for respiratory arrest 2/2 influenza, complicated by aspiration pna, PEA arrest x2 with ROSC, PE s/p tPA, ARF requiring HD, s/p trach and PEG tube presents with bradycardia and EKG concerning for arrythmia 2/2 medication side effect vs hyperkalemia. Patient presenting from PMD office for Hgb of 7, sent to ER. According to , patient is more lethargic, sleepy and weak. No fevers, chills, blood in stool.    PMD: Dr. Capri Mao - [facility] hx taken from old chart and  ,68F pmhx of DM2, HTN, breast CA (dx in 2/2017), w recent hospitalization for respiratory arrest 2/2 influenza, complicated by aspiration pna, PEA arrest x2 with ROSC, PE s/p tPA, ARF requiring HD, s/p trach and PEG tube presents with bradycardia and EKG concerning for arrythmia 2/2 medication side effect vs hyperkalemia. Patient presenting from PMD office for Hgb of 7, sent to ER. According to , patient is more lethargic, sleepy and weak. No fevers, chills, blood in stool. Last Hgb 8.3 April, was taking iron, as per PMD.    PMD: Dr. Capri Mao - [facility]

## 2019-08-24 NOTE — ED PROVIDER NOTE - PROGRESS NOTE DETAILS
Helen Leong, Resident: patient feels well, wants to go home. Hgb 7.7, occult neg. Will dc home, no need for transfusion at this time as likely chronic. Will rec Heme follow up.

## 2019-08-24 NOTE — ED ADULT NURSE NOTE - NSIMPLEMENTINTERV_GEN_ALL_ED
Implemented All Fall with Harm Risk Interventions:  [place] to call system. Call bell, personal items and telephone within reach. Instruct patient to call for assistance. Room bathroom lighting operational. Non-slip footwear when patient is off stretcher. Physically safe environment: no spills, clutter or unnecessary equipment. Stretcher in lowest position, wheels locked, appropriate side rails in place. Provide visual cue, wrist band, yellow gown, etc. Monitor gait and stability. Monitor for mental status changes and reorient to person, place, and time. Review medications for side effects contributing to fall risk. Reinforce activity limits and safety measures with patient and family. Provide visual clues: red socks.

## 2019-09-26 ENCOUNTER — APPOINTMENT (OUTPATIENT)
Dept: ENDOCRINOLOGY | Facility: CLINIC | Age: 69
End: 2019-09-26
Payer: MEDICARE

## 2019-09-26 VITALS
DIASTOLIC BLOOD PRESSURE: 66 MMHG | OXYGEN SATURATION: 97 % | BODY MASS INDEX: 25.4 KG/M2 | WEIGHT: 126 LBS | SYSTOLIC BLOOD PRESSURE: 120 MMHG | HEIGHT: 59 IN | HEART RATE: 76 BPM

## 2019-09-26 LAB
GLUCOSE BLDC GLUCOMTR-MCNC: 224
HBA1C MFR BLD HPLC: 5.7

## 2019-09-26 PROCEDURE — 82962 GLUCOSE BLOOD TEST: CPT

## 2019-09-26 PROCEDURE — 99214 OFFICE O/P EST MOD 30 MIN: CPT | Mod: 25

## 2019-09-26 PROCEDURE — 83036 HEMOGLOBIN GLYCOSYLATED A1C: CPT | Mod: QW

## 2019-09-27 LAB
25(OH)D3 SERPL-MCNC: 31.9 NG/ML
ALBUMIN SERPL ELPH-MCNC: 3.5 G/DL
ALP BLD-CCNC: 96 U/L
ALT SERPL-CCNC: 19 U/L
ANION GAP SERPL CALC-SCNC: 15 MMOL/L
AST SERPL-CCNC: 15 U/L
BILIRUB SERPL-MCNC: 0.2 MG/DL
BUN SERPL-MCNC: 24 MG/DL
CALCIUM SERPL-MCNC: 9.2 MG/DL
CHLORIDE SERPL-SCNC: 99 MMOL/L
CO2 SERPL-SCNC: 22 MMOL/L
CREAT SERPL-MCNC: 0.86 MG/DL
GLUCOSE SERPL-MCNC: 184 MG/DL
POTASSIUM SERPL-SCNC: 5.1 MMOL/L
PROT SERPL-MCNC: 7.2 G/DL
SODIUM SERPL-SCNC: 136 MMOL/L

## 2019-09-27 NOTE — ASSESSMENT
[FreeTextEntry1] : 70 yo with hx of t2dm controlled with neuropathy, microalbuminuria and retinopathy here for f/u.\par 1) T2DM: bs testing bid. HbA1c likely affected by her recent anemia so will check a serum HbA1c/fructosamine.\par Adjust insulin 70/30 humulin 15 units sq qnoon and 6 units sq qpm\par Metformin ER 750mg po bid\par Flu given shot today.\par 2) Hx R wrist fracture/OP/PHPT/letrazole tx: \par Avoid thiazide diuretics.\par She still has not completed dental work, so still unable to treat her. Explained to her family that her inability to walk and her letrozole use places her at risk for broken bones.\par BMD last done 11/18.\par Vit D supplement, check level today with BMP.\par 3) HTN: At goal, less than 140/80 (based on her age). Continue coreg and cardura.\par 4) Dyslipidemia:  Atorvastatin tx.\par Return in 4 months\par Send note to oncologist Dr. Campuzano and PCP Dr. Horne.\par Spent 40 minutes coordinating care for this complex patient.

## 2019-09-27 NOTE — HISTORY OF PRESENT ILLNESS
[FreeTextEntry1] : 68 yo female with hx of t2dm controlled (HbA1c 5.7%) with retinopathy, nephropathy and neuropathy being managed with insulin 70/30 regular: 15 units sq with lunch and 8 units qpm, as well as metformin ER 750mg po bid.  \par May 2019 she was treated with abx as an outpt, she is due for a f/u CT scan. August 2019 her HgB dropped and she was told to go to the ED for a blood x-fusion but her numbers got better so they d/c her with outpt heme f/u. She was started on IV iron. She has declined c'scope over the years but her stool guaiac cards/cologuard were normal. The are weighing the options. She denies tarry stool or BRBPR. She has a history hemorrhoids. \par Her appetite has been picking up and her weight is stable. \par \par PCP: Dr. Capri Horne, [facility] [phone] (t), [phone] (f)\par Dr. Yossi Shi, optho\par Dr. Mustpaha Hu, retinal specialist\par Dr. Kennedy Norton, cardiology [phone]\par Dr. Aneta Lugo, surgical oncology\par Dr. Silver Campuzano, medical oncology\par Dr. Geremias Lamb, GI\par Dr. Caleb Mcnamara, hematology

## 2019-09-27 NOTE — PHYSICAL EXAM
[No Acute Distress] : no acute distress [Alert] : alert [Normal Rate and Effort] : normal respiratory rhythm and effort [No Accessory Muscle Use] : no accessory muscle use [Clear to Auscultation] : lungs were clear to auscultation bilaterally [Normal Rate] : heart rate was normal  [Normal S1, S2] : normal S1 and S2 [Regular Rhythm] : with a regular rhythm [Normal Bowel Sounds] : normal bowel sounds [Not Tender] : non-tender [Soft] : abdomen soft [Right Foot Was Examined] : right foot ~C was examined [Not Distended] : not distended [Left Foot Was Examined] : left foot ~C was examined [Normal] : normal [2+] : 2+ in the dorsalis pedis [#2 Diminished] : number 2 was normal [#1 Diminished] : number 1 was normal [#3 Diminished] : number 3 was normal [#4 Diminished] : number 4 was normal [#6 Diminished] : number 6 was normal [#5 Diminished] : number 5 was normal [#9 Diminished] : number 9 was normal [#8 Diminished] : number 8 was normal [#7 Diminished] : number 7 was normal [#10 Diminished] : number 10 was normal

## 2019-10-28 ENCOUNTER — RX RENEWAL (OUTPATIENT)
Age: 69
End: 2019-10-28

## 2019-12-16 ENCOUNTER — RX RENEWAL (OUTPATIENT)
Age: 69
End: 2019-12-16

## 2020-01-02 ENCOUNTER — MEDICATION RENEWAL (OUTPATIENT)
Age: 70
End: 2020-01-02

## 2020-01-08 ENCOUNTER — MEDICATION RENEWAL (OUTPATIENT)
Age: 70
End: 2020-01-08

## 2020-01-16 RX ORDER — BLOOD SUGAR DIAGNOSTIC
STRIP MISCELLANEOUS
Qty: 300 | Refills: 3 | Status: ACTIVE | COMMUNITY
Start: 2017-08-31

## 2020-01-16 RX ORDER — LANCETS
EACH MISCELLANEOUS
Qty: 3 | Refills: 3 | Status: ACTIVE | COMMUNITY
Start: 2017-08-31

## 2020-02-06 ENCOUNTER — APPOINTMENT (OUTPATIENT)
Dept: ENDOCRINOLOGY | Facility: CLINIC | Age: 70
End: 2020-02-06

## 2020-02-25 NOTE — PROGRESS NOTE ADULT - PROBLEM SELECTOR PLAN 1
[place] [facility]  [facility] Medical Record Number:  [id number]  Place of Service where encounter took place:  [facility] [[id number]]  Chief Complaint   Patient presents with     Hospital F/U       HPI:    Kimberly Stephenson  is a 73 year old (1946), who is being seen today for an episodic care visit.  HPI information obtained from: facility chart records, facility staff, patient report, [facility] chart review and Care Everywhere [facility] chart review.     Today's concern is:    ICD-10-CM    1. Type 1 diabetes mellitus with hyperglycemia (H) E10.65    2. Diabetic ketoacidosis without coma associated with diabetes mellitus due to underlying condition (H) E08.10    3. Pain in joint, ankle and foot, left M25.572    4. Essential hypertension I10    5. PATRICIA (acute kidney injury) (H) N17.9      Resident admitted 2/2 DKA, this is second hospitalization in 2 months. Staff has been administrating insulin while residing at [facility] so unlikely noncompliance. Was complaining of left lateral ankle pain upon admission to ED and ultimately treated for cellulitis of affected extremity. Discharged on cephalexin- will complete in 2 days.   -Resident reports she is back at baseline. Blood sugar readings 200's.     Past Medical and Surgical History reviewed in Epic today.    MEDICATIONS:    Current Outpatient Medications   Medication Sig Dispense Refill     acetaminophen (TYLENOL) 325 MG tablet Take 650 mg by mouth every 4 hours as needed for mild pain       amLODIPine (NORVASC) 10 MG tablet Take 10 mg by mouth daily       apixaban ANTICOAGULANT (ELIQUIS) 5 MG tablet Take 2.5 mg by mouth 2 times daily        atorvastatin (LIPITOR) 40 MG tablet Take 40 mg by mouth daily.       busPIRone (BUSPAR) 7.5 MG tablet Take 7.5 mg by mouth 2 times daily       cephALEXin (KEFLEX) 500 MG capsule Take 1 capsule (500 mg) by mouth 3 times daily for 4 days 12 capsule 0     diphenoxylate-atropine  (LOMOTIL) 2.5-0.025 MG tablet Take 2 tablets by mouth 4 times daily as needed for diarrhea 30 tablet 3     escitalopram (LEXAPRO) 10 MG tablet Take 10 mg by mouth daily       insulin aspart (NOVOLOG PEN) 100 UNIT/ML pen Inject 5 Units Subcutaneous 3 times daily (with meals) Inject as per sliding scale:if 0 - 69 = - Refer to hypoglycemia treatment protocol;70 - 149 = 0 units No corrective insulin, administer full dose of prandial insulin if ordered;150 - 199 = 2 units;200 - 249 = 4 units;250 - 299 = 6 units;300 - 349 = 8 units;350 - 399 = 10 units;400 - 999 = 12 units and notify MD,subcutaneously with meals related to TYPE 2 DIABETES MELLITUS WITH KETOACIDOSIS WITHOUT COMA (E11.10)       insulin glargine (LANTUS PEN) 100 UNIT/ML pen Inject 30 Units Subcutaneous every morning       potassium chloride (KLOR-CON) 20 MEQ packet Take 20 mEq by mouth daily       Skin Protectants, Misc. (EUCERIN) cream Apply topically daily       venlafaxine (EFFEXOR-XR) 150 MG 24 hr capsule Take 150 mg by mouth daily       REVIEW OF SYSTEMS:  4 point ROS including Respiratory, CV, GI and , other than that noted in the HPI,  is negative    Objective:  /64   Pulse 111   Temp 97.2  F (36.2  C)   Resp 22   Wt 80.4 kg (177 lb 3.2 oz)   BMI 28.60 kg/m    Exam:  GENERAL APPEARANCE:  Alert, in no distress  ENT:  Mouth and posterior oropharynx normal, moist mucous membranes, normal hearing acuity  RESP:  respiratory effort and palpation of chest normal, lungs clear to auscultation   CV:  Palpation and auscultation of heart done , regular rate and rhythm, no murmur, rub, or gallop  M/S:   Gait and station normal  Digits and nails normal  SKIN:  erythema left ankke  NEURO:   Cranial nerves 2-12 are normal tested and grossly at patient's baseline  PSYCH:  memory impaired     Labs:   Labs done in SNF are in [facility] EPIC. Please refer to them using [employer]/Care Everywhere. and Recent labs in EPIC reviewed by me today.      ASSESSMENT/PLAN:  (E10.65) Type 1 diabetes mellitus with hyperglycemia (H)  (primary encounter diagnosis)  (E08.10) Diabetic ketoacidosis without coma associated with diabetes mellitus due to underlying condition (H)  Comment: Likely 2/2 cellulitis of left ankle. Hyperglycemia has resolved and resident is at baseline.   Plan:   -Will follow up with endocrinologist this week.   -Continue current insulin regimen without change.     (M25.572) Pain in joint, ankle and foot, left  Comment: Likely 2/2 cellulitis.   Plan: Has 2 days remaining of cephalexin, pain has resolved.     (I10) Essential hypertension  Comment: Chronic, managed on current medication regimen.   Plan: No change and adjustments as clinically indicated. Keep SBP> 130 mmHg and DBP > 65 mmHg (levels below these increase mortality as shown by standard studies and observations).     (N17.9) PATRICIA (acute kidney injury) (H)  Comment:   Creatinine   Date Value Ref Range Status   02/23/2020 0.55 0.52 - 1.04 mg/dL Final   Plan: Continue to avoid nephrotoxic medications and renal dose when indicated.     Orders:  CBC and BMP as scheduled.       Electronically signed by:  VANDANA Munguia [facility]      likely from ATN in setting of cardiac arrest. Pt continues to be oligo anuric  Completed 2 days of HD ( Thursday/Friday)     HD today : 3 hrs, 3K , 0.5-1 liter as tolerated  Monitor for renal recovery  Change Tamiflu to QD ( dose after Dialysis)

## 2020-03-31 ENCOUNTER — RX RENEWAL (OUTPATIENT)
Age: 70
End: 2020-03-31

## 2020-08-20 ENCOUNTER — APPOINTMENT (OUTPATIENT)
Dept: ENDOCRINOLOGY | Facility: CLINIC | Age: 70
End: 2020-08-20

## 2020-09-04 NOTE — DISCHARGE NOTE ADULT - SIGNS AND SYMPTOMS OF STROKE:
Statement Selected
[email or web address]

## 2020-09-10 ENCOUNTER — RX RENEWAL (OUTPATIENT)
Age: 70
End: 2020-09-10

## 2020-09-21 NOTE — PHYSICAL THERAPY INITIAL EVALUATION ADULT - LEVEL OF INDEPENDENCE: SIT/STAND, REHAB EVAL
"Assessment:    Lexy (See-mahn-ee) Rolando is seen in consultation for a pilonidal cyst, at the request of ED physician.    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    Plan:  We have had a detailed discussion regarding the nature of sacrococcygeal pilonidal disease and treatment options.  I have explained the need for meticulous wound care if surgical excision is elected.  Even with such wound care prolonged wound healing and recurrent infections often occur. I have offered her continued observation versus excision. She chooses excision and understands it may be left open to close by secondary intention.  We discussed prolonged dressing changes, pain control postop.      HPI:  Lexy presents today in consultation for a painful lump on her vitaliy cleft for the past 4 years. she has had several (~4) infections in the past.  She has had multiple previous incision and drainage procedures - last in April of this year.  She is not currently on antibiotics.    Past Medical History:   has a past medical history of Depressed affect.    Past Surgical History:  I&D pilonidal 2015    Social History:  Social History     Tobacco Use     Smoking status: Current Every Day Smoker     Smokeless tobacco: Never Used   Substance Use Topics     Alcohol use: Yes     Comment: \"a few times a week\"     Drug use: Yes     Frequency: 7.0 times per week     Types: Marijuana   Not currently working    Family History:  No family history on file.  No bleeding/clotting disorders or anesthesia reactions    ROS:  The 10 point review of systems is negative other than noted in the HPI and above.    PE:  /64   Pulse 94   Resp 16   Ht 1.575 m (5' 2\")   Wt 52.2 kg (115 lb)   SpO2 98%   BMI 21.03 kg/m    General appearance: well-nourished, no apparent distress  HEENT:  Head normocephalic and atraumatic, pupils equal and round, conjunctivae clear, no scleral icterus  Lungs: respirations unlabored  Musculoskeletal:  Normal " station and gait  Extremities: without edema    Neurologic: alert, speech is clear, moves all extremities with good strength  Psychiatric: mood and affect are appropriate  Skin: there are 3 tiny punctums located on the mid- vitaliy cleft.  It is 2 cm from the coccyx. There is a vertical scar to the right of these pits.  There is no drainage.  There is no surrounding cellulitis.       This note was created using voice recognition software. Undetected word substitutions or other errors may have occurred.     Time spent with the patient with greater that 50% of the time in discussion was 25 minutes.     Minerva Holbrook MD      Please route or send letter to:  *None*         moderate assist (50% patients effort)/minimum assist (75% patients effort)

## 2020-10-14 ENCOUNTER — RX RENEWAL (OUTPATIENT)
Age: 70
End: 2020-10-14

## 2020-11-13 ENCOUNTER — RX RENEWAL (OUTPATIENT)
Age: 70
End: 2020-11-13

## 2020-12-08 ENCOUNTER — RX RENEWAL (OUTPATIENT)
Age: 70
End: 2020-12-08

## 2020-12-23 ENCOUNTER — APPOINTMENT (OUTPATIENT)
Dept: ENDOCRINOLOGY | Facility: CLINIC | Age: 70
End: 2020-12-23
Payer: MEDICARE

## 2020-12-23 ENCOUNTER — TRANSCRIPTION ENCOUNTER (OUTPATIENT)
Age: 70
End: 2020-12-23

## 2020-12-23 PROCEDURE — 99215 OFFICE O/P EST HI 40 MIN: CPT | Mod: 95

## 2020-12-23 RX ORDER — FERROUS SULFATE 325(65) MG
325 (65 FE) TABLET ORAL DAILY
Qty: 90 | Refills: 0 | Status: ACTIVE | COMMUNITY
Start: 2020-12-23

## 2020-12-23 RX ORDER — OMEGA-3-ACID ETHYL ESTERS CAPSULES 1 G/1
1 CAPSULE, LIQUID FILLED ORAL
Qty: 1 | Refills: 0 | Status: ACTIVE | COMMUNITY
Start: 2020-12-23

## 2020-12-23 RX ORDER — COLD-HOT PACK
125 MCG EACH MISCELLANEOUS
Qty: 90 | Refills: 0 | Status: ACTIVE | COMMUNITY
Start: 2020-12-23

## 2020-12-23 NOTE — HISTORY OF PRESENT ILLNESS
[FreeTextEntry1] : 69 yo female with hx of t2dm controlled (HbA1c 8% Nov 20/CrCl 52) with retinopathy, nephropathy and neuropathy being managed with insulin 70/30 regular: 15 units sq with lunch and 8 units qpm, as well as metformin ER 750mg po bid.  \par Her  is concerned about the recalls of Metformin so he wants to stop it. \par Her weight is stable. Her meals consists of lunch and dinner. Her first shot is given at 2pm with lunch and her second shot is given between 8 and 10pm. \par She was recently started on Aricept for memory issues. She cannot walk and is confined to a wheelchair. \par In the morning she has milk, 1/4 roti, nuts, 1/2 banana and dried fruit. Lunch is 2 roti and vegetable soup, meat, and 1/2 banana. Dinner is 1.5 roti, meat and vegetable.\par \par PCP: Dr. Capri Horne, [facility] [phone] (t), [phone] (f)\par Dr. Yossi Shi, optho\par Dr. Mustapha Hu, retinal specialist\par Dr. Kennedy Norton, cardiology [phone]\par Dr. Aneta Lugo, surgical oncology\par Dr. Silver Campuzano, medical oncology\par Dr. Geremias Lamb, GI\par Dr. Caleb Mcnamara, hematology

## 2020-12-23 NOTE — ASSESSMENT
[FreeTextEntry1] : 71 yo with hx of t2dm uncontrolled (HbA1c 8% 11/20) with neuropathy, microalbuminuria and retinopathy and PHPTH here for f/u.\par 1) T2DM: bs testing bid. HbA1c likely affected by her recent anemia so will check a serum HbA1c/fructosamine.\par Adjust insulin as thus: 70/30 humulin 14 units sq qam and 8 units sq qpm\par Discontinue Metformin ER 750mg po bid\par 2) Hx R wrist fracture/OP/PHPT/letrazole tx: \par Avoid thiazide diuretics.\par She still has not completed dental work and is now homebound, so still unable to treat her. Explained to her family that her inability to walk and her letrozole use places her at risk for broken bones. She had a bad reaction to Boniva in the past so prolia would be best.\par BMD last done 11/18.\par Vit D supplement: 5000 IU po qdaily. Needs a vit D level with her next set of labs.\par 3) HTN:  Continue coreg and cardura. per  her Systolic BP runs 130-140.\par 4) Dyslipidemia:  Atorvastatin tx.\par Return in 4 months\par Send note to oncologist Dr. Campuzano and PCP Dr. Horne.\par Spent 40 minutes coordinating care for this complex patient.

## 2020-12-23 NOTE — REASON FOR VISIT
[Home] : at home, [unfilled] , at the time of the visit. [Medical Office: ([facility])___] : at the medical office located in  [Spouse] : spouse [Other:____] : [unfilled]

## 2021-03-01 NOTE — PROGRESS NOTE ADULT - PROBLEM SELECTOR PLAN 1
Patient contacted regarding [id number] Suspect. Discussed COVID-19 related testing which was pending at this time. Test results were pending. Patient informed of results, if available? n/a. Outreach made within 2 business days of discharge: Yes    LPN Care Coordinator contacted the patient by telephone to perform post discharge assessment. Verified name and  with patient as identifiers. Provided introduction to self, and explanation of LPN Care Coordinator role, and reason for call due to risk factors for infection and/or exposure to COVID-19. Symptoms reviewed with patient who verbalized the following symptoms: no new symptoms, no worsening symptoms and Pt states she feels fine. Denies SOB, chest pain, cough, chills, fever, body aches, wheezing, lightheadness/dizziness,HA, n/v/d at this time. Due to no new or worsening symptoms encounter was not routed to provider for escalation. Discussed follow-up appointments. If no appointment was previously scheduled, appointment scheduling offered: yes  [facility] follow up appointment(s): No future appointments. Non-[facility] follow up appointment(s): n/a    Patient encouraged to make an appointment with PCP for f/up. Advised to return to ED if symptoms worsen or fail to improve. Patients acknowledges understanding. Pt states she was told by PCP to await COVID test results before she can schedule an appt. Advance Care Planning:   Does patient have an Advance Directive: currently not on file; education provided     Patient has following risk factors of: no known risk factors . LPN reviewed discharge instructions, medical action plan and red flags such as increased shortness of breath, increasing fever and signs of decompensation with patient who verbalized understanding. Discussed exposure protocols and quarantine with CDC Guidelines What to do if you are sick with coronavirus disease .  Patient was given an opportunity for questions and concerns.  The patient agrees to contact the [facility] exposure line [phone], local [facility] department [address]  ([phone]) and PCP office for questions related to their healthcare. LPN provided contact information for future needs. Reviewed and educated patient on any new and changed medications related to discharge diagnosis. Patient/family/caregiver given information for [employer] and agrees to enroll yes  Patient's preferred e-mail:  n/a  Patient's preferred phone number: [phone]   Based on Loop alert triggers, patient will be contacted by nurse care manager for worsening symptoms. Pt will be further monitored by COVID Loop Team based on severity of symptoms and risk factors. likely from ATN in setting of cardiac arrest. Pt continues to be oligo anuric.  scheduled for PUF with 2 L UF today. likely from ATN in setting of cardiac arrest. Pt continues to be oligo anuric.  s/p HD on Monday and yesterday. No electrolyte disturbances. BUN ~ 24. Schedule for PUF today with 2  L UF as tolerated.

## 2021-04-19 ENCOUNTER — RX RENEWAL (OUTPATIENT)
Age: 71
End: 2021-04-19

## 2021-05-10 ENCOUNTER — RX RENEWAL (OUTPATIENT)
Age: 71
End: 2021-05-10

## 2021-06-08 ENCOUNTER — RX RENEWAL (OUTPATIENT)
Age: 71
End: 2021-06-08

## 2021-06-21 ENCOUNTER — RX RENEWAL (OUTPATIENT)
Age: 71
End: 2021-06-21

## 2021-06-21 ENCOUNTER — APPOINTMENT (OUTPATIENT)
Dept: UROLOGY | Facility: CLINIC | Age: 71
End: 2021-06-21
Payer: MEDICARE

## 2021-06-21 ENCOUNTER — NON-APPOINTMENT (OUTPATIENT)
Age: 71
End: 2021-06-21

## 2021-06-21 VITALS
SYSTOLIC BLOOD PRESSURE: 202 MMHG | RESPIRATION RATE: 16 BRPM | DIASTOLIC BLOOD PRESSURE: 74 MMHG | HEART RATE: 66 BPM | TEMPERATURE: 98 F

## 2021-06-21 VITALS — DIASTOLIC BLOOD PRESSURE: 70 MMHG | SYSTOLIC BLOOD PRESSURE: 198 MMHG | RESPIRATION RATE: 16 BRPM | HEART RATE: 70 BPM

## 2021-06-21 DIAGNOSIS — N39.41 URGE INCONTINENCE: ICD-10-CM

## 2021-06-21 DIAGNOSIS — R35.0 FREQUENCY OF MICTURITION: ICD-10-CM

## 2021-06-21 PROCEDURE — 99204 OFFICE O/P NEW MOD 45 MIN: CPT

## 2021-06-21 RX ORDER — OXYBUTYNIN CHLORIDE 5 MG/1
5 TABLET ORAL
Refills: 0 | Status: ACTIVE | COMMUNITY

## 2021-06-21 NOTE — REVIEW OF SYSTEMS
[Confused] : confusion [Limb Weakness] : limb weakness [Memory Lapses Or Loss] : memory loss [Repeating Questions] : repeated questioning about recent events [Negative] : Heme/Lymph [see HPI] : see HPI

## 2021-06-22 ENCOUNTER — RX CHANGE (OUTPATIENT)
Age: 71
End: 2021-06-22

## 2021-06-22 LAB
APPEARANCE: CLEAR
BACTERIA: NEGATIVE
BILIRUBIN URINE: NEGATIVE
BLOOD URINE: NEGATIVE
COLOR: NORMAL
GLUCOSE QUALITATIVE U: ABNORMAL
HYALINE CASTS: 3 /LPF
KETONES URINE: NEGATIVE
LEUKOCYTE ESTERASE URINE: NEGATIVE
MICROSCOPIC-UA: NORMAL
NITRITE URINE: NEGATIVE
PH URINE: 6.5
PROTEIN URINE: ABNORMAL
RED BLOOD CELLS URINE: 2 /HPF
SPECIFIC GRAVITY URINE: 1.02
SQUAMOUS EPITHELIAL CELLS: 4 /HPF
UROBILINOGEN URINE: NORMAL
WHITE BLOOD CELLS URINE: 6 /HPF

## 2021-06-22 RX ORDER — TROSPIUM CHLORIDE 60 MG/1
60 CAPSULE, EXTENDED RELEASE ORAL
Qty: 30 | Refills: 11 | Status: ACTIVE | COMMUNITY
Start: 2021-06-21

## 2021-06-22 NOTE — PHYSICAL EXAM

## 2021-06-22 NOTE — HISTORY OF PRESENT ILLNESS
[FreeTextEntry1] : Jessica Barraza presents to the office for initial visit. She is a 71 year-old female with history of breast cancer (s/p b/l mastectomy, chemo, radiation 2017), CVA 2018 with residual deficits and hospitalization requiring temporary trach/PEG and HD, HTN and DM2 c/b nephropathy and retinopathy. She presents with complaint of leaking urine at night. She presents with her  and daughter who explain that she has had symptoms of urgency for 2 years and tried oxybutynin at that time but stopped. About 2-3 months ago the patient started to have daytime and nighttime incontinence. She had to void every two hours which she is able to communicate but would often leak when being helped to the bathroom. She uses a walker at home and a wheelchair when out. At night, she does not wake up and/or communicate urge for urination and has wet sheets every morning. She uses a diaper to go out with, otherwise her family prefers not to use pads to avoid skin irritation and they clean her often. She restarted oxybutynin 5 mg ER 5/14/21 per PCP, which helps to prolong voiding, now goes every 3 hours and she can hold her urine a bit better when transitioning to the toilet. She still has significant leakage of urine every night.  There is possibly some decline in mental status when pt is using this medication despite. No clear constipation or dry mouth issues. She has no history of issues with UTI or renal stones.  \par \par PMH: HTN, DM2 A1c 7.2\par \par PCP Dr. Capri Horne \par \par \par \par

## 2021-06-22 NOTE — ASSESSMENT
[FreeTextEntry1] : Suspect sx are multifactorial with stroke, decreased mobility and cognition and long standing DM with neuropathy all contributing. Discussed that this may make her sx harder to treat.\par -- discontine oxybutynin given possible concern for cognitive side effects\par -- Offerred trial of myrbetriq but insurance denied. Plan for trial of trospium. \par -- discussed primafit external catheter at night to wick away leakage\par -- RTC 1 mo

## 2021-06-23 ENCOUNTER — RX CHANGE (OUTPATIENT)
Age: 71
End: 2021-06-23

## 2021-06-23 LAB — BACTERIA UR CULT: NORMAL

## 2021-06-24 ENCOUNTER — NON-APPOINTMENT (OUTPATIENT)
Age: 71
End: 2021-06-24

## 2021-07-19 ENCOUNTER — APPOINTMENT (OUTPATIENT)
Dept: NEUROLOGY | Facility: CLINIC | Age: 71
End: 2021-07-19
Payer: MEDICARE

## 2021-07-19 VITALS
WEIGHT: 130 LBS | HEIGHT: 59 IN | HEART RATE: 76 BPM | SYSTOLIC BLOOD PRESSURE: 196 MMHG | BODY MASS INDEX: 26.21 KG/M2 | DIASTOLIC BLOOD PRESSURE: 72 MMHG

## 2021-07-19 PROCEDURE — 99205 OFFICE O/P NEW HI 60 MIN: CPT

## 2021-07-23 ENCOUNTER — APPOINTMENT (OUTPATIENT)
Dept: UROLOGY | Facility: CLINIC | Age: 71
End: 2021-07-23

## 2021-07-28 ENCOUNTER — NON-APPOINTMENT (OUTPATIENT)
Age: 71
End: 2021-07-28

## 2021-08-10 ENCOUNTER — NON-APPOINTMENT (OUTPATIENT)
Age: 71
End: 2021-08-10

## 2021-08-10 NOTE — ED ADULT NURSE NOTE - FALL HARM RISK TYPE OF ASSESSMENT
Chief Complaint   Patient presents with   • Office Visit     pt presents alone   • Cough     x few weeks; started as sore throat then sinuses now coughing up green sputum -- sore throat resolved, mild sinus drainage, coughing fits still productive; same symptoms as when he had bronchitis       HPI:   57 year old male presents on a Monday afternoon to the Urgent Care for complaint of chest congestion and cough.  He said his symptoms began over 2 weeks ago with a sore throat.  He then noticed a sinuses became plugged up in his chest was to.  He has had a cough.  Cough will occur in paroxysms which will produce greenish colored phlegm.  He has been diagnosed with bronchitis in the past.  Denies feeling short of breath.  No fever.  Does not use inhalers and does not feel as though he is wheezing.      Current Outpatient Medications   Medication Sig   • aspirin 81 MG EC tablet Take 81 mg by mouth daily.   • atorvastatin (LIPITOR) 40 MG tablet Take 40 mg by mouth daily.   • colchicine (COLCRYS) 0.6 MG tablet Take 0.6 mg by mouth as needed.   • famotidine (PEPCID) 20 MG tablet Take 20 mg by mouth 2 (two) times a day.   • glipiZIDE (GLUCOTROL) 5 MG 24 hr tablet Take 5 mg by mouth 2 (two) times a day.   • hydrochlorothiazide (MICROZIDE) 12.5 MG capsule Take 12.5 mg by mouth daily.   • lisinopril (ZESTRIL) 20 MG tablet Take 20 mg by mouth daily.   • metoPROLOL tartrate (LOPRESSOR) 25 MG tablet Take 25 mg by mouth 2 (two) times a day.   • allopurinol (ZYLOPRIM) 300 MG tablet Take 100 mg by mouth daily.   • azithromycin (Zithromax Z-Hay) 250 MG tablet Sig: take 2 tabs po today, then 1 po q day until gone.   • benzonatate (TESSALON PERLES) 200 MG capsule Take 1 capsule by mouth 3 times daily as needed for Cough.     No current facility-administered medications for this visit.       ALLERGIES:  No Known Allergies    PCP:  No Pcp    SOCIAL HISTORY:  Tobacco-nonsmoker.  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    Vitals:    08/09/21 1207   BP: (!) 166/90   BP Location: RUE - Right upper extremity   Patient Position: Sitting   Cuff Size: Regular   Pulse: (!) 57   Resp: 18   Temp: 97.1 °F (36.2 °C)   TempSrc: Temporal   SpO2: 98%   Weight: 119.7 kg   Height: 6' 1\" (1.854 m)       PHYSICAL EXAM:   GENERAL:  Patient is a 57 year old male  who presents in NAD. Patient is well developed, well nourished, alert and oriented x 3. Intermittent cough during the encounter.  EYE: PERRL, EOMI. No nystagmus noted.  EAR: Both TMs with normal landmarks. EACs are clear.  NOSE: Nostrils are patent with moist nasal mucosa. Turbinates are normal in appearance.  THROAT: Pharynx is clear without erythema or swelling. Tonsils are not enlarged. No exudates noted.  NECK: Supple, no thyromegaly, no cervical lymphadenopathy. No nuchal rigidity.  CHEST: Lungs are clear to auscultation without wheezing, rhonchi or rales. Normal chest excursion and aeration.  CV: RRR, no murmur.  NEURO: CN II-XII grossly intact.      Diagnostics:  None.        Radiology:  No results found.     ASSESSMENT:  1. Acute bronchitis, unspecified organism          PLAN:  Presents with complaint of a cough that is been over 2 weeks.  My suspicion is bronchitis.  Will treat with a Z-Hay.  Benzonatate, 200 mg, # 2 1 caplet p.o. t.i.d. p.r.n. cough.  Advised increase oral fluids, least 0.5 gal of water a day.  Monitor symptoms, follow up with PCP or return here.  He understands and agrees.      RUBINA Espinal PA-C                 Admission

## 2021-08-17 ENCOUNTER — APPOINTMENT (OUTPATIENT)
Dept: PHYSICAL MEDICINE AND REHAB | Facility: CLINIC | Age: 71
End: 2021-08-17
Payer: MEDICARE

## 2021-08-17 VITALS
OXYGEN SATURATION: 94 % | TEMPERATURE: 96.8 F | SYSTOLIC BLOOD PRESSURE: 178 MMHG | DIASTOLIC BLOOD PRESSURE: 84 MMHG | HEART RATE: 82 BPM

## 2021-08-17 DIAGNOSIS — M21.371 FOOT DROP, RIGHT FOOT: ICD-10-CM

## 2021-08-17 PROCEDURE — 99203 OFFICE O/P NEW LOW 30 MIN: CPT

## 2021-08-18 ENCOUNTER — TRANSCRIPTION ENCOUNTER (OUTPATIENT)
Age: 71
End: 2021-08-18

## 2021-08-26 ENCOUNTER — APPOINTMENT (OUTPATIENT)
Dept: ENDOCRINOLOGY | Facility: CLINIC | Age: 71
End: 2021-08-26

## 2021-09-04 NOTE — DISCHARGE NOTE ADULT - OTHER SIGNIFICANT FINDINGS
Negative Attending DC note:  68 f pmh recent hospital stay after cardiac arrest, CVA, RUSS, went to rehab with trach collar and peg- at rehab plan to dc peg and start oral  but pt admitted with symptomatic bradycardia-high K and low sodium  in MICU- transfered to floor, HR improved, electrolytes stable  s/p I&D of small back abcess on zosyn, Cx no growth- ID cleared no abx  pulm and ENT evaluated and sp decannulation  GI removed PEG  developed morbilliform rash likely 2/2 drug rash from hydralazine  started on low dose clonidine patch  discharged to rehab

## 2021-09-14 ENCOUNTER — RX RENEWAL (OUTPATIENT)
Age: 71
End: 2021-09-14

## 2021-09-14 NOTE — PHYSICAL EXAM
[Normal] : Normal skin color and pigmentation, normal turgor and no rash [de-identified] : takes a few steps - step to gait. Difficulty advancing R leg [de-identified] : bl UEs 4/5, RLE 4/5 HF/KF, 0/5 ADF/APF; LLE 4/5 ; + tremor [de-identified] : nonverbal, flat affect

## 2021-09-14 NOTE — ASSESSMENT
[FreeTextEntry1] : -PT- ROM, balance, amb w AD\par - Upon consultation and collaboration with orthotist, patient has medical necessity for a right custom AFO with varus flange.  Patient’s current prefabricated orthosis is unable to control her inversion and causes pain and discomfort.  She has medial necessity for a right custom AFO with varus flange as she prevents with foot drop, inversion and decreased standing balance.  The design of the custom AFO will reduce the risk of falling by controlling patient’s weakness and foot drop as it will allow the foot to hold in a neutral position during swing phase and permit proper heel contact at the initial phase of stance while managing her inversion.  Patient requires stabilization of the foot and ankle (because patient’s foot drops and scuffs the ground during ambulation) and the patient has the ability to benefit functionally in that she will be able to ambulate with increased safety and decreased risk for falls. Patient is unable to use prefabricated braces as she cannot be fitted with a prefabricated brace because patient will require use of the orthosis for longer than six months and because there are no prefabricated AFOs designed to hold an inverted foot in a neutral position.  Patient is at risk for falls without use of the custom AFO.\par - Patient to see Dr. Garner\par - d/w patient's daughter and  rehab prognosis and plan\par - f/u prn

## 2021-09-14 NOTE — HISTORY OF PRESENT ILLNESS
[FreeTextEntry1] : Patient is a 72 yo female w a history of bl Occipital CVAs.\par Initially noted symptoms in November 2020 but returned to baseline function- ambulating. \par In June 2021 had a precipitous decline in function- follow up imaging noted bl occipital lobe CVAs.\par Has been following w Dr. Vargas- also scheduled to see Dr. Garner, stroke neurologist.\par Had loop recorder last week.\par S/P loop recorder . \par Has had a total of 11 sessions of PT- home PT- it was denied by insurance. \par Has significant issues walking.

## 2021-10-07 ENCOUNTER — APPOINTMENT (OUTPATIENT)
Dept: NEUROLOGY | Facility: CLINIC | Age: 71
End: 2021-10-07
Payer: MEDICARE

## 2021-10-07 VITALS
DIASTOLIC BLOOD PRESSURE: 73 MMHG | BODY MASS INDEX: 26.21 KG/M2 | HEART RATE: 75 BPM | HEIGHT: 59 IN | SYSTOLIC BLOOD PRESSURE: 187 MMHG | WEIGHT: 130 LBS

## 2021-10-07 DIAGNOSIS — R26.9 UNSPECIFIED ABNORMALITIES OF GAIT AND MOBILITY: ICD-10-CM

## 2021-10-07 DIAGNOSIS — Z86.73 PERSONAL HISTORY OF TRANSIENT ISCHEMIC ATTACK (TIA), AND CEREBRAL INFARCTION W/OUT RESIDUAL DEFICITS: ICD-10-CM

## 2021-10-07 PROCEDURE — 99215 OFFICE O/P EST HI 40 MIN: CPT

## 2021-10-07 NOTE — HISTORY OF PRESENT ILLNESS
[FreeTextEntry1] : 10/7 Ms. Barraza is accompanied by her . Her  reports that her short term memory is impaired. Over the last 5-6 months she has been experiencing tremors of the hand and head. Tremors in right hand as well.\par \par \par \par Ms. Barraza si a 71 year old female PMHx Breast Cancer (diagnosed in Feb 2017), HTN, Diabetes who presents today for to establish care after stroke in 2018 after PEA \par \par HPI:\par 68 y/o woman w/ hx Breast Cancer (diagnosed in Feb 2017) HTN, DM II, HLD p/w fever, RVP positive for flu on 1/30/18. PEA arrest with chest compressions for 10min, achieved ROSC after Epi x 2, and was intubated. Patient required re-intubation (improperly intubated initially into the esophagus) after she PEA arrested again, with ROSC achieved after 30min with Epi x 5. Patient was also found to have bilateral pneumothoraces, s/p 3 chest tubes, 2 on the right side, and 1 on the left. Patient also has paracentesis drain for decompression of the abdomen. CTH performed on 1/31, showing interval development of L PCA ischemic infarct. Pt is intubated, has been on sedation precedex. Per family at bedside, pt is intermittently waking up, opening eyes, responding appropriately by nodding her head, making arm motions asking for water, and following commands. \par \par MRI head: 2.6.18\par Bilateral acute centrum semiovale ovale watershed infarcts.\par Acute left occipital lobe infarct.\par \par Since 2018 she has been following up with Dr. Garcia but now transferring care. Most recent outside MRI from 2021 notes "interval development of R occipital infarct" that was not seen on 2018 imaging. She has periods of LE weakness and overall there is a decline in her health.

## 2021-10-07 NOTE — DISCUSSION/SUMMARY
[FreeTextEntry1] : Ms. Barraza is a 68 y/o woman p/w fever, RVP positive for flu on 1/30/18. PEA arrest with chest compressions for 10min, received ROSC, intubated, requiring re-intubation s/p recurrent PEA w/ ROSC after 30 mins. CT head significant for left PCA infarct etiology unclear but presumably cardio embolism related to patients overall clinical course. \par \par Cognitive Impairment - s/p stroke and cardiac arrest\par Neuropsychological evaluation \par  consult for tremors - can do Telehealth since they have difficulty making it to appointments. \par \par \par \par Outside imaging (6/21) uploaded if and new PCA infarct is identified would recommend cardiac monitoring and TTE. Continue ASA as directed by cardiology. Continue to follow up with PCP/cardiology for BP and statin management (goal LDL <70) as well as all routine primary care needs. Follow up with cardiology for evaluation and management of TTE and cardiac monitoring. Continue with PT/OT/Speech therapy. PM&R referral placed as well. Screened patient for sleep apnea with no identifiable risk factors at this time. Will reevaluate next visit. We discussed aggressive vascular strict risk factor control.\par \par  TCD and Doppler's to be obtained at next appointment. Patient and family were educated on signs and symptoms of stroke and to contact 911 immediately if experiencing any. \par \par Daughter Leola \par \par  [Antithrombotic therapy with ___] : antithrombotic therapy with  [unfilled] [Intensive Blood Pressure Control] : intensive blood pressure control [Lipid Lowering Therapy] : lipid lowering therapy [Patient encouraged to discuss with Primary MD] : I encouraged the patient to discuss these important issues with ~his/her~ primary care doctor [Goals and Counseling] : I have reviewed the goals of stroke risk factor modification. I counseled the patient on measures to reduce stroke risk, including the importance of medication compliance, risk factor control, exercise, healthy diet and avoidance of smoking. I reviewed stroke warning signs and symptoms and appropriate actions to take if such occur.

## 2021-10-27 ENCOUNTER — APPOINTMENT (OUTPATIENT)
Dept: UROLOGY | Facility: CLINIC | Age: 71
End: 2021-10-27

## 2021-11-20 ENCOUNTER — RX RENEWAL (OUTPATIENT)
Age: 71
End: 2021-11-20

## 2021-12-09 ENCOUNTER — RX RENEWAL (OUTPATIENT)
Age: 71
End: 2021-12-09

## 2021-12-27 NOTE — ED ADULT NURSE NOTE - PRIMARY CARE PROVIDER
unknown [Dear  ___] : Dear  [unfilled], [I had the pleasure of seeing your patient today.] : I had the pleasure of seeing your patient today

## 2021-12-28 ENCOUNTER — APPOINTMENT (OUTPATIENT)
Dept: NEUROLOGY | Facility: CLINIC | Age: 71
End: 2021-12-28
Payer: MEDICARE

## 2021-12-28 ENCOUNTER — NON-APPOINTMENT (OUTPATIENT)
Age: 71
End: 2021-12-28

## 2021-12-28 PROCEDURE — 99213 OFFICE O/P EST LOW 20 MIN: CPT | Mod: 95

## 2021-12-29 ENCOUNTER — INPATIENT (INPATIENT)
Facility: HOSPITAL | Age: 71
LOS: 6 days | Discharge: HOME HEALTH | DRG: 377 | End: 2022-01-05
Attending: INTERNAL MEDICINE | Admitting: INTERNAL MEDICINE
Payer: MEDICARE

## 2021-12-29 VITALS
TEMPERATURE: 98 F | SYSTOLIC BLOOD PRESSURE: 182 MMHG | HEIGHT: 60 IN | OXYGEN SATURATION: 97 % | DIASTOLIC BLOOD PRESSURE: 79 MMHG | HEART RATE: 93 BPM | WEIGHT: 175.05 LBS

## 2021-12-29 DIAGNOSIS — Z98.890 OTHER SPECIFIED POSTPROCEDURAL STATES: Chronic | ICD-10-CM

## 2021-12-29 DIAGNOSIS — K92.2 GASTROINTESTINAL HEMORRHAGE, UNSPECIFIED: ICD-10-CM

## 2021-12-29 LAB
ALBUMIN SERPL ELPH-MCNC: 3.1 G/DL — LOW (ref 3.3–5)
ALP SERPL-CCNC: 67 U/L — SIGNIFICANT CHANGE UP (ref 40–120)
ALT FLD-CCNC: 18 U/L — SIGNIFICANT CHANGE UP (ref 10–45)
ANION GAP SERPL CALC-SCNC: 13 MMOL/L — SIGNIFICANT CHANGE UP (ref 5–17)
APPEARANCE UR: CLEAR — SIGNIFICANT CHANGE UP
APTT BLD: 28.4 SEC — SIGNIFICANT CHANGE UP (ref 27.5–35.5)
AST SERPL-CCNC: 23 U/L — SIGNIFICANT CHANGE UP (ref 10–40)
BACTERIA # UR AUTO: NEGATIVE — SIGNIFICANT CHANGE UP
BASOPHILS # BLD AUTO: 0.07 K/UL — SIGNIFICANT CHANGE UP (ref 0–0.2)
BASOPHILS NFR BLD AUTO: 0.8 % — SIGNIFICANT CHANGE UP (ref 0–2)
BILIRUB SERPL-MCNC: 0.3 MG/DL — SIGNIFICANT CHANGE UP (ref 0.2–1.2)
BILIRUB UR-MCNC: NEGATIVE — SIGNIFICANT CHANGE UP
BLD GP AB SCN SERPL QL: NEGATIVE — SIGNIFICANT CHANGE UP
BUN SERPL-MCNC: 23 MG/DL — SIGNIFICANT CHANGE UP (ref 7–23)
CALCIUM SERPL-MCNC: 9 MG/DL — SIGNIFICANT CHANGE UP (ref 8.4–10.5)
CHLORIDE SERPL-SCNC: 102 MMOL/L — SIGNIFICANT CHANGE UP (ref 96–108)
CO2 SERPL-SCNC: 19 MMOL/L — LOW (ref 22–31)
COLOR SPEC: COLORLESS — SIGNIFICANT CHANGE UP
CREAT SERPL-MCNC: 1.39 MG/DL — HIGH (ref 0.5–1.3)
DIFF PNL FLD: ABNORMAL
EOSINOPHIL # BLD AUTO: 0.27 K/UL — SIGNIFICANT CHANGE UP (ref 0–0.5)
EOSINOPHIL NFR BLD AUTO: 3.2 % — SIGNIFICANT CHANGE UP (ref 0–6)
EPI CELLS # UR: 2 /HPF — SIGNIFICANT CHANGE UP
GLUCOSE BLDC GLUCOMTR-MCNC: 129 MG/DL — HIGH (ref 70–99)
GLUCOSE BLDC GLUCOMTR-MCNC: 134 MG/DL — HIGH (ref 70–99)
GLUCOSE BLDC GLUCOMTR-MCNC: 142 MG/DL — HIGH (ref 70–99)
GLUCOSE BLDC GLUCOMTR-MCNC: 169 MG/DL — HIGH (ref 70–99)
GLUCOSE SERPL-MCNC: 117 MG/DL — HIGH (ref 70–99)
GLUCOSE UR QL: NEGATIVE — SIGNIFICANT CHANGE UP
HCT VFR BLD CALC: 30.8 % — LOW (ref 34.5–45)
HCT VFR BLD CALC: 31.7 % — LOW (ref 34.5–45)
HGB BLD-MCNC: 10 G/DL — LOW (ref 11.5–15.5)
HGB BLD-MCNC: 10 G/DL — LOW (ref 11.5–15.5)
HYALINE CASTS # UR AUTO: 1 /LPF — SIGNIFICANT CHANGE UP (ref 0–2)
IMM GRANULOCYTES NFR BLD AUTO: 1.1 % — SIGNIFICANT CHANGE UP (ref 0–1.5)
INR BLD: 0.99 RATIO — SIGNIFICANT CHANGE UP (ref 0.88–1.16)
KETONES UR-MCNC: NEGATIVE — SIGNIFICANT CHANGE UP
LEUKOCYTE ESTERASE UR-ACNC: NEGATIVE — SIGNIFICANT CHANGE UP
LIDOCAIN IGE QN: 24 U/L — SIGNIFICANT CHANGE UP (ref 7–60)
LYMPHOCYTES # BLD AUTO: 1.01 K/UL — SIGNIFICANT CHANGE UP (ref 1–3.3)
LYMPHOCYTES # BLD AUTO: 11.8 % — LOW (ref 13–44)
MAGNESIUM SERPL-MCNC: 2 MG/DL — SIGNIFICANT CHANGE UP (ref 1.6–2.6)
MCHC RBC-ENTMCNC: 29.1 PG — SIGNIFICANT CHANGE UP (ref 27–34)
MCHC RBC-ENTMCNC: 29.1 PG — SIGNIFICANT CHANGE UP (ref 27–34)
MCHC RBC-ENTMCNC: 31.5 GM/DL — LOW (ref 32–36)
MCHC RBC-ENTMCNC: 32.5 GM/DL — SIGNIFICANT CHANGE UP (ref 32–36)
MCV RBC AUTO: 89.5 FL — SIGNIFICANT CHANGE UP (ref 80–100)
MCV RBC AUTO: 92.2 FL — SIGNIFICANT CHANGE UP (ref 80–100)
MONOCYTES # BLD AUTO: 0.62 K/UL — SIGNIFICANT CHANGE UP (ref 0–0.9)
MONOCYTES NFR BLD AUTO: 7.3 % — SIGNIFICANT CHANGE UP (ref 2–14)
NEUTROPHILS # BLD AUTO: 6.49 K/UL — SIGNIFICANT CHANGE UP (ref 1.8–7.4)
NEUTROPHILS NFR BLD AUTO: 75.8 % — SIGNIFICANT CHANGE UP (ref 43–77)
NITRITE UR-MCNC: NEGATIVE — SIGNIFICANT CHANGE UP
NRBC # BLD: 0 /100 WBCS — SIGNIFICANT CHANGE UP (ref 0–0)
NRBC # BLD: 0 /100 WBCS — SIGNIFICANT CHANGE UP (ref 0–0)
NT-PROBNP SERPL-SCNC: 4687 PG/ML — HIGH (ref 0–300)
PH UR: 6.5 — SIGNIFICANT CHANGE UP (ref 5–8)
PLATELET # BLD AUTO: 234 K/UL — SIGNIFICANT CHANGE UP (ref 150–400)
PLATELET # BLD AUTO: 247 K/UL — SIGNIFICANT CHANGE UP (ref 150–400)
POTASSIUM SERPL-MCNC: 5.6 MMOL/L — HIGH (ref 3.5–5.3)
POTASSIUM SERPL-SCNC: 5.6 MMOL/L — HIGH (ref 3.5–5.3)
PROT SERPL-MCNC: 7.5 G/DL — SIGNIFICANT CHANGE UP (ref 6–8.3)
PROT UR-MCNC: ABNORMAL
PROTHROM AB SERPL-ACNC: 11.9 SEC — SIGNIFICANT CHANGE UP (ref 10.6–13.6)
RAPID RVP RESULT: SIGNIFICANT CHANGE UP
RBC # BLD: 3.44 M/UL — LOW (ref 3.8–5.2)
RBC # BLD: 3.44 M/UL — LOW (ref 3.8–5.2)
RBC # FLD: 13.6 % — SIGNIFICANT CHANGE UP (ref 10.3–14.5)
RBC # FLD: 13.6 % — SIGNIFICANT CHANGE UP (ref 10.3–14.5)
RBC CASTS # UR COMP ASSIST: 36 /HPF — HIGH (ref 0–4)
RH IG SCN BLD-IMP: POSITIVE — SIGNIFICANT CHANGE UP
SARS-COV-2 RNA SPEC QL NAA+PROBE: SIGNIFICANT CHANGE UP
SODIUM SERPL-SCNC: 134 MMOL/L — LOW (ref 135–145)
SP GR SPEC: 1.01 — SIGNIFICANT CHANGE UP (ref 1.01–1.02)
TROPONIN T, HIGH SENSITIVITY RESULT: 46 NG/L — SIGNIFICANT CHANGE UP (ref 0–51)
UROBILINOGEN FLD QL: NEGATIVE — SIGNIFICANT CHANGE UP
WBC # BLD: 7.44 K/UL — SIGNIFICANT CHANGE UP (ref 3.8–10.5)
WBC # BLD: 8.55 K/UL — SIGNIFICANT CHANGE UP (ref 3.8–10.5)
WBC # FLD AUTO: 7.44 K/UL — SIGNIFICANT CHANGE UP (ref 3.8–10.5)
WBC # FLD AUTO: 8.55 K/UL — SIGNIFICANT CHANGE UP (ref 3.8–10.5)
WBC UR QL: 3 /HPF — SIGNIFICANT CHANGE UP (ref 0–5)

## 2021-12-29 PROCEDURE — 71045 X-RAY EXAM CHEST 1 VIEW: CPT | Mod: 26

## 2021-12-29 PROCEDURE — 93010 ELECTROCARDIOGRAM REPORT: CPT | Mod: GC

## 2021-12-29 PROCEDURE — 74176 CT ABD & PELVIS W/O CONTRAST: CPT | Mod: 26

## 2021-12-29 PROCEDURE — 99285 EMERGENCY DEPT VISIT HI MDM: CPT | Mod: GC

## 2021-12-29 RX ORDER — SODIUM CHLORIDE 9 MG/ML
1000 INJECTION, SOLUTION INTRAVENOUS
Refills: 0 | Status: DISCONTINUED | OUTPATIENT
Start: 2021-12-29 | End: 2022-01-05

## 2021-12-29 RX ORDER — DEXTROSE 50 % IN WATER 50 %
25 SYRINGE (ML) INTRAVENOUS ONCE
Refills: 0 | Status: DISCONTINUED | OUTPATIENT
Start: 2021-12-29 | End: 2022-01-05

## 2021-12-29 RX ORDER — INSULIN LISPRO 100/ML
VIAL (ML) SUBCUTANEOUS AT BEDTIME
Refills: 0 | Status: DISCONTINUED | OUTPATIENT
Start: 2021-12-29 | End: 2022-01-05

## 2021-12-29 RX ORDER — ASPIRIN/CALCIUM CARB/MAGNESIUM 324 MG
81 TABLET ORAL DAILY
Refills: 0 | Status: DISCONTINUED | OUTPATIENT
Start: 2021-12-29 | End: 2022-01-05

## 2021-12-29 RX ORDER — INSULIN LISPRO 100/ML
VIAL (ML) SUBCUTANEOUS
Refills: 0 | Status: DISCONTINUED | OUTPATIENT
Start: 2021-12-29 | End: 2022-01-05

## 2021-12-29 RX ORDER — CARVEDILOL PHOSPHATE 80 MG/1
25 CAPSULE, EXTENDED RELEASE ORAL EVERY 12 HOURS
Refills: 0 | Status: DISCONTINUED | OUTPATIENT
Start: 2021-12-29 | End: 2022-01-02

## 2021-12-29 RX ORDER — SODIUM CHLORIDE 9 MG/ML
1000 INJECTION INTRAMUSCULAR; INTRAVENOUS; SUBCUTANEOUS ONCE
Refills: 0 | Status: COMPLETED | OUTPATIENT
Start: 2021-12-29 | End: 2021-12-29

## 2021-12-29 RX ORDER — LETROZOLE 2.5 MG/1
2.5 TABLET, FILM COATED ORAL DAILY
Refills: 0 | Status: DISCONTINUED | OUTPATIENT
Start: 2021-12-29 | End: 2022-01-05

## 2021-12-29 RX ORDER — INSULIN GLARGINE 100 [IU]/ML
6 INJECTION, SOLUTION SUBCUTANEOUS AT BEDTIME
Refills: 0 | Status: DISCONTINUED | OUTPATIENT
Start: 2021-12-29 | End: 2021-12-31

## 2021-12-29 RX ORDER — ATORVASTATIN CALCIUM 80 MG/1
10 TABLET, FILM COATED ORAL AT BEDTIME
Refills: 0 | Status: DISCONTINUED | OUTPATIENT
Start: 2021-12-29 | End: 2022-01-05

## 2021-12-29 RX ORDER — PANTOPRAZOLE SODIUM 20 MG/1
40 TABLET, DELAYED RELEASE ORAL
Refills: 0 | Status: DISCONTINUED | OUTPATIENT
Start: 2021-12-29 | End: 2022-01-05

## 2021-12-29 RX ORDER — PANTOPRAZOLE SODIUM 20 MG/1
40 TABLET, DELAYED RELEASE ORAL ONCE
Refills: 0 | Status: COMPLETED | OUTPATIENT
Start: 2021-12-29 | End: 2021-12-29

## 2021-12-29 RX ORDER — DEXTROSE 50 % IN WATER 50 %
12.5 SYRINGE (ML) INTRAVENOUS ONCE
Refills: 0 | Status: DISCONTINUED | OUTPATIENT
Start: 2021-12-29 | End: 2022-01-05

## 2021-12-29 RX ORDER — DEXTROSE 50 % IN WATER 50 %
15 SYRINGE (ML) INTRAVENOUS ONCE
Refills: 0 | Status: DISCONTINUED | OUTPATIENT
Start: 2021-12-29 | End: 2022-01-05

## 2021-12-29 RX ORDER — GLUCAGON INJECTION, SOLUTION 0.5 MG/.1ML
1 INJECTION, SOLUTION SUBCUTANEOUS ONCE
Refills: 0 | Status: DISCONTINUED | OUTPATIENT
Start: 2021-12-29 | End: 2022-01-05

## 2021-12-29 RX ORDER — DONEPEZIL HYDROCHLORIDE 10 MG/1
10 TABLET, FILM COATED ORAL AT BEDTIME
Refills: 0 | Status: DISCONTINUED | OUTPATIENT
Start: 2021-12-29 | End: 2022-01-05

## 2021-12-29 RX ADMIN — CARVEDILOL PHOSPHATE 25 MILLIGRAM(S): 80 CAPSULE, EXTENDED RELEASE ORAL at 15:50

## 2021-12-29 RX ADMIN — PANTOPRAZOLE SODIUM 40 MILLIGRAM(S): 20 TABLET, DELAYED RELEASE ORAL at 12:34

## 2021-12-29 RX ADMIN — ATORVASTATIN CALCIUM 10 MILLIGRAM(S): 80 TABLET, FILM COATED ORAL at 22:35

## 2021-12-29 RX ADMIN — LETROZOLE 2.5 MILLIGRAM(S): 2.5 TABLET, FILM COATED ORAL at 17:46

## 2021-12-29 RX ADMIN — SODIUM CHLORIDE 1000 MILLILITER(S): 9 INJECTION INTRAMUSCULAR; INTRAVENOUS; SUBCUTANEOUS at 09:21

## 2021-12-29 RX ADMIN — INSULIN GLARGINE 6 UNIT(S): 100 INJECTION, SOLUTION SUBCUTANEOUS at 23:17

## 2021-12-29 RX ADMIN — DONEPEZIL HYDROCHLORIDE 10 MILLIGRAM(S): 10 TABLET, FILM COATED ORAL at 22:35

## 2021-12-29 RX ADMIN — Medication 81 MILLIGRAM(S): at 15:50

## 2021-12-29 NOTE — ED PROVIDER NOTE - PHYSICAL EXAMINATION
PHYSICAL EXAM:  GENERAL: + mild distress  HEAD:  Atraumatic, Normocephalic  EYES: EOMI, PERRLA, + conjunctival pallor  ENT: No erythema/pallor/petechiae/lesions; TMs clear b/l  NECK: Supple, No JVD  LUNG: CTA b/l; no r/r/w  HEART: RRR, +S1/S2; No m/r/g  ABDOMEN: soft, NT/ND; BS audible   EXTREMITIES:  2+ Peripheral Pulses, brisk cap refill. No clubbing, cyanosis, or edema  NERVOUS SYSTEM:  AAOx3, speech clear. No sensory/motor deficits   MSK: FROM all 4 extremities, full and equal strength  SKIN: No rashes or lesions

## 2021-12-29 NOTE — CONSULT NOTE ADULT - ASSESSMENT
71 year old female with rectal bleeding    1. Rectal bleeding. Pt has some debility from stroke, would not be able to tolerate prep. May be self limited colitis. Pt family wants to hold off on invasive procedures. WIll obtain CT abdomen. Rectal exam w brown stool right now.    2. CVA    3. Anemia        Advanced care planning forms were discussed. Code status including forceful chest compressions, defibrillation and intubation were discussed. The risks benefits and alternatives to pertinent gastrointestinal procedures and interventions were discussed in detail and all questions were answered. Duration: 15 Minutes.      Santos Lux M.D.   Gastroenterology and Hepatology  [address] [place], [place]  Office: [phone]  Cell: [phone]

## 2021-12-29 NOTE — H&P ADULT - NSHPLABSRESULTS_GEN_ALL_CORE
LABS:                        10.0   8.55  )-----------( 234      ( 29 Dec 2021 09:20 )             31.7         134<L>  |  102  |  23  ----------------------------<  117<H>  5.6<H>   |  19<L>  |  1.39<H>    Ca    9.0      29 Dec 2021 09:20  Mg     2.0         TPro  7.5  /  Alb  3.1<L>  /  TBili  0.3  /  DBili  x   /  AST  23  /  ALT  18  /  AlkPhos  67      PT/INR - ( 29 Dec 2021 12:50 )   PT: 11.9 sec;   INR: 0.99 ratio         PTT - ( 29 Dec 2021 12:50 )  PTT:28.4 sec  CAPILLARY BLOOD GLUCOSE            Urinalysis Basic - ( 29 Dec 2021 10:14 )    Color: Colorless / Appearance: Clear / S.010 / pH: x  Gluc: x / Ketone: Negative  / Bili: Negative / Urobili: Negative   Blood: x / Protein: 300 mg/dL / Nitrite: Negative   Leuk Esterase: Negative / RBC: 36 /hpf / WBC 3 /HPF   Sq Epi: x / Non Sq Epi: 2 /hpf / Bacteria: Negative        RADIOLOGY & ADDITIONAL TESTS:   < from: Xray Chest 1 View- PORTABLE-Urgent (21 @ 09:22) >    Chronic patchy airspace opacities in the upper lobes. No new   consolidation is seen.    < end of copied text >    < from: Transthoracic Echocardiogram (18 @ 12:52) >        < end of copied text >    < from: 12 Lead ECG (19 @ 11:27) >        < end of copied text >

## 2021-12-29 NOTE — ED PROVIDER NOTE - CLINICAL SUMMARY MEDICAL DECISION MAKING FREE TEXT BOX
Patient is a 71 year old female with past medical history significant for non-insulin dependent diabetes, hypertension, breast cancer (diagnosed in 2017), and prior cardiac arrest presents to the Emergency Department with chief complaint of rectal bleeding.  Patient was evaluated for intra-abdominal, hematologic, and metabolic etiology of disease.  Patient received EKG, plain films of chest, and labs. Patient is a 71 year old female with past medical history significant for non-insulin dependent diabetes, hypertension, breast cancer (diagnosed in 2017), and prior cardiac arrest presents to the Emergency Department with chief complaint of rectal bleeding.  Patient was evaluated for intra-abdominal, hematologic, and metabolic etiology of disease.  Patient received EKG, plain films of chest, and labs.  Labs and testing significant for hemoglobin of 10.0 and elevated BNP.  Case discussed with admitting physician and agreed with decision for admission.  Patient and patient's daughter agreed with decision for admission.

## 2021-12-29 NOTE — ED PROVIDER NOTE - ATTENDING CONTRIBUTION TO CARE
Patient presenting with acute rectal bleeding, no prior history of same but extensive other comorbidities, will evaluate for need for emergent transfusion, admit for serial hemoglobin monitoring and GI evaluation.

## 2021-12-29 NOTE — H&P ADULT - NSICDXPASTMEDICALHX_GEN_ALL_CORE_FT
PAST MEDICAL HISTORY:  Breast CA     Cardiac arrest     Diabetes     HTN (hypertension)     Stroke

## 2021-12-29 NOTE — ED PROVIDER NOTE - OBJECTIVE STATEMENT
Patient is a 71 year old female with past medical history significant for non-insulin dependent diabetes, hypertension, breast cancer (diagnosed in 2017), and prior cardiac arrest presents to the Emergency Department with chief complaint of rectal bleeding.  According to patient's daughter, the patient has had darkened stools the past 2-3 days with associated lethargy.  Patient has been tolerating less orally.  Daughter describes mucous-containing stool mixed with bright red blood and darkened stool.  Patient has no history of endoscopy or colonoscopy, as reported by the patient's daughter.  Patient notes lethargy, but denies any chest pain, shortness of breath, or other physical complaints.  No sick contacts or recent travel.    PMD: Dr. Capri Mao - [facility]

## 2021-12-29 NOTE — H&P ADULT - NSHPPHYSICALEXAM_GEN_ALL_CORE
Vitals last 24 hrs  T(C): 36.8 (12-29-21 @ 13:11), Max: 37.2 (12-29-21 @ 08:40)  HR: 100 (12-29-21 @ 13:11) (80 - 102)  BP: 187/76 (12-29-21 @ 13:11) (179/88 - 188/70)  RR: 18 (12-29-21 @ 13:11) (18 - 24)  SpO2: 93% (12-29-21 @ 13:11) (93% - 97%)    PHYSICAL EXAM:  GENERAL: NAD,  well-developed  HEAD:  Atraumatic, Normocephalic  EYES: EOMI, PERRLA, conjunctiva and sclera clear  ENMT: No tonsillar erythema, exudates, or enlargement; Moist mucous membranes  NECK: Supple, No JVD, Normal thyroid  HEART: Regular rate and rhythm; No murmurs, rubs, or gallops  RESPIRATORY: CTA B/L, No W/R/R  ABDOMEN: Soft, Nontender, distended; Bowel sounds present  NEUROLOGY: A&Ox2-3, nonfocal, moving all extremities  EXTREMITIES:  2+ Peripheral Pulses, No clubbing, cyanosis, or edema  SKIN: warm, dry, normal color, no rash or abnormal lesions

## 2021-12-29 NOTE — H&P ADULT - NSHPREVIEWOFSYSTEMS_GEN_ALL_CORE
REVIEW OF SYSTEMS:per HPI    CONSTITUTIONAL: No weakness, fevers or chills  EYES/ENT: rt eye decreased vision  No vertigo or throat pain   NECK: No pain or stiffness  RESPIRATORY: No cough, wheezing, hemoptysis; No shortness of breath  CARDIOVASCULAR: No chest pain or palpitations  GASTROINTESTINAL: no epigastric pain. No nausea, vomiting, or hematemesis; No diarrhea or constipation. per HPI hematochesia and abd pain  GENITOURINARY: No dysuria, frequency or hematuria  NEUROLOGICAL: leg weakness post CVA  SKIN: No itching, rashes

## 2021-12-29 NOTE — PATIENT PROFILE ADULT - NSTOBACCONEVERSMOKERY/N_GEN_A
Occupational Therapy  Facility/Department: [facility] ACUTE REHAB UNIT  Daily Treatment Note  NAME: Vanesa Philip  : 1951  MRN: [id number]    Date of Service: 2020    Discharge Recommendations:  S Level 4, Home with Home health OT, Home with assist PRN  OT Equipment Recommendations  Equipment Needed: No  Other: Continue to assess pending pt progress    Assessment   Performance deficits / Impairments: Decreased functional mobility ; Decreased ADL status; Decreased endurance;Decreased balance;Decreased fine motor control;Decreased coordination;Decreased strength;Decreased high-level IADLs  Assessment: Pt has made functional progress during inpatient. Pt now completes transfers w/ CGA. Pt continues to require intermittent assist for ADL completion. Pt is currently limited d/t fatigue and pain. Pt would benefit from continued therapy once discharged from ARU in order to maximize safety and independence upon discharge   Treatment Diagnosis: Multiple performance deficits assoicated w/ debility   OT Education: OT Role;Plan of Care;Transfer Training;ADL Adaptive Strategies  Patient Education: Pt would benefit from continued reinforcement of education provided   Barriers to Learning: None   REQUIRES OT FOLLOW UP: Yes  Activity Tolerance  Activity Tolerance: Patient limited by fatigue;Patient limited by pain  Activity Tolerance: Pt unable to complete remainder of session d/t fatigue and pain   Safety Devices  Safety Devices in place: Yes  Type of devices: All fall risk precautions in place;Call light within reach; Left in chair;Nurse notified;Gait belt         Patient Diagnosis(es): Debility     has a past medical history of Cary's esophagus, Breast cancer ([facility] [address] Clostridioides difficile infection, Colon cancer ([facility] Depression, Endometrial carcinoma ([facility] [address] Endometrial thickening on ultra sound, Hypertension, IBS (irritable bowel syndrome), MRSA (methicillin resistant staph aureus) culture positive, Normocytic anemia, Osteoarthritis, Peptic ulcer disease, Peripheral neuropathy, and Seasonal allergies. has a past surgical history that includes Colonoscopy (5/16); Upper gastrointestinal endoscopy (5/16); Endoscopy, colon, diagnostic; Hysterectomy, total abdominal (6/13/16); Tunneled venous port placement (07/21/2016); Breast biopsy; and bronchoscopy (11/13/2019). Restrictions  Restrictions/Precautions  Restrictions/Precautions: Fall Risk, General Precautions, Contact Precautions  Required Braces or Orthoses?: Yes  Required Braces or Orthoses  Right Lower Extremity Brace: Ankle Foot Orthotics  Position Activity Restriction  Other position/activity restrictions: Patient is a 78 yo F with pmh metastatic endometrial cancer (on active chemo), chronic DVT, and chronic pain who initially presented 6/24/2020 with diarrhea. Found to have c diff, dehydration, and hypomagnesemia. Treated with IVF, electrolyte repletion, and oral vancomycin. Currently, patient reports generalized weakness and fatigue. Overall improving since admission. She reports loose stools are resolving. She has baseline RLE weakness and sensory deficit which she attributes to right pelvic mass s/p radiation. She is interested in coming to inpatient rehab to improve her function prior to returning home. Sister plans on bringing in AFO but not present at time of evaluation  Subjective   General  Chart Reviewed: Yes  Patient assessed for rehabilitation services?: Yes  Family / Caregiver Present: No  Diagnosis: Debility   Subjective  Subjective: Pt seated in chair upon entry,  reporting fatigue but agreeable to seated ADL  Vital Signs  Patient Currently in Pain: No     Objective    ADL  Grooming: Setup(oral care while seated in w/c at sink )  LE Dressing: Dependent/Total(Increased assist d/t fatigue )  Toileting: Minimal assistance  Additional Comments: Pt requesting to complete activities in sitting d/t faitgue. SPT>w/c>commode. Pt voided liquid BM.  Increased assist for completion d/t fatigue. SPT>w/c. Pt becoming tearful d/t pain in buttocks upon sitting. Expressing frustration d/t near constant toileting. Pt completed grooming tasks while seated in w/c. Pt assisted to bed at end of session. Balance  Sitting Balance: Independent  Standing Balance: Contact guard assistance  Standing Balance  Time: ~2 min total  Activity: Transfers, ADL completion   Functional Mobility  Functional - Mobility Device: Rolling Walker  Activity: Other  Assist Level: Contact guard assistance  Functional Mobility Comments: stand step transfer   Toilet Transfers  Toilet - Technique: Stand pivot  Equipment Used: Raised toilet seat with rails  Toilet Transfer: Contact guard assistance  Bed mobility  Sit to Supine: Moderate assistance  Transfers  Sit to stand: Minimal assistance  Stand to sit: Minimal assistance      Cognition  Overall Cognitive Status: Exceptions  Arousal/Alertness: Appropriate responses to stimuli  Following Commands: Follows multistep commands with increased time; Follows multistep commands with repitition  Attention Span: Attends with cues to redirect  Memory: Decreased short term memory  Safety Judgement: Decreased awareness of need for assistance  Problem Solving: Assistance required to correct errors made;Assistance required to identify errors made  Insights: Fully aware of deficits  Initiation: Does not require cues  Sequencing: Does not require cues           Second Session: Pt seated in chair upon entry, pleasant and agreeable to OT. Pt rated pain a 4/10, nurse was notified and medication was provided to Pt. Pt completed the following UE stretches while seated in recliner (x~10 second holds) cervical flexion/extension stretching, cervical rotation stretch, shoulder elevation/depression, rotational lumbar stretch.  Pt completed the following UE TE w/o weights in order to address functional strength and ROM: elbow flexion/extension, composite flexion/extension, single arm No

## 2021-12-29 NOTE — ED PROVIDER NOTE - NS ED ROS FT
CONSTITUTIONAL: + weakness  EYES/ENT: No visual changes;  No vertigo or throat pain   NECK: No pain or stiffness  RESPIRATORY: No cough, wheezing, hemoptysis; No shortness of breath  CARDIOVASCULAR: No chest pain or palpitations  GASTROINTESTINAL: + rectal bleeding  GENITOURINARY: No dysuria, frequency or hematuria  NEUROLOGICAL: No numbness or weakness  SKIN: No itching, burning, rashes, or lesions   All other review of systems is negative unless indicated above.

## 2021-12-29 NOTE — ED ADULT NURSE NOTE - NSIMPLEMENTINTERV_GEN_ALL_ED
Implemented All Fall with Harm Risk Interventions:  [place] to call system. Call bell, personal items and telephone within reach. Instruct patient to call for assistance. Room bathroom lighting operational. Non-slip footwear when patient is off stretcher. Physically safe environment: no spills, clutter or unnecessary equipment. Stretcher in lowest position, wheels locked, appropriate side rails in place. Provide visual cue, wrist band, yellow gown, etc. Monitor gait and stability. Monitor for mental status changes and reorient to person, place, and time. Review medications for side effects contributing to fall risk. Reinforce activity limits and safety measures with patient and family. Provide visual clues: red socks.

## 2021-12-29 NOTE — ED ADULT NURSE NOTE - OBJECTIVE STATEMENT
0825 71 yr old female brought to ER via ambulance on stretcher for further eval and tx  of BRBPR. last night. coughing. fall risk, contact and airborne isolation maintained 0837 71 yr old female brought to ER via ambulance on stretcher for further eval and tx  of BRBPR. last night. coughing, confused. + rhonchi and wheezes. fall risk, contact and airborne isolation maintained. Wearing diaper. lives with  and daughter. Per EMS  hx of 2 tracey Cardiac arrests in past, CVA, bilat mastectomy, HTN, diabetes. Color pink. skin W&D. Dr kapadia pt

## 2021-12-29 NOTE — PATIENT PROFILE ADULT - FALL HARM RISK - HARM RISK INTERVENTIONS

## 2021-12-29 NOTE — ED PROVIDER NOTE - PROGRESS NOTE DETAILS
DO Jimenez: patient re-assessed and resting in no acute distress.  patient reports mild lethargy at this time.  daughter called and updated at length regarding patient's status.  all patients answered.

## 2021-12-29 NOTE — PATIENT PROFILE ADULT - DEAF OR HARD OF HEARING?
Patient informed of her ultrasound results. 1 small subcentimeter thyroid nodule with a cystic appearance. Recommend a thyroid US in 1 yr to monitor, PCP to order. No abnormal appearing lymph nodes. Per radiologist she has numerous shotty lymph nodes that have normal lymph node architecture and per radiologist are most likely reactive in nature. Advised follow up with Dr. Marvin as directed and PCP prn. She verbalized agreement & understanding.    no

## 2021-12-29 NOTE — H&P ADULT - ASSESSMENT
70 y/o F w/ hx of DM2, HTN, breast CA (dx in 2/2017) sp b/l  mastectomy and LN resection  jan/2018-hx respiratory arrest 2/2 influenza, complicated by aspiration pna, PEA arrest x2 , CVA w residual Rt leg weakness and rt eye visual loss, PE s/p tPA, ARF requiring HD, s/p trach and PEG tube, 5/18 peg and trach removed  late 2020- 2nd CVA -neurologist- Dr Prajapati , ILR placed aug/21  aw hematochesia- stool mixed w blood and mucus x5 since last night, +abd pain relieved w BM  hx of hemorrhoidectomy/fissurectomy>20yrs ago    #lower GIbleed  Hematochesia  r/o colitis  monitor CBC, active t&S  to transfuse for symptoms or hb <8  GI CS fu    #CVA hx  sp ILR placed recently  monitor-no events  resume asa, statin    #Cards- ?cardiac arrest hx  last Echo 2018 reveiwed  stable  sees Dr Dunn    #HTN  resume carvedilol    #DM-2   dc humulin and metformin-home meds  start lantus and correction scale  a1c    #Breast ca sp b/l mastectomy  resume letrazole    #DVT ppx- venodynes  hold ac dt gi bleed    [facility]  [phone]

## 2021-12-29 NOTE — H&P ADULT - HISTORY OF PRESENT ILLNESS
71 year old female with past medical history significant for non-insulin dependent diabetes, hypertension, breast cancer (diagnosed in 2017), and prior cardiac arrest presents to the Emergency Department with chief complaint of rectal bleeding.  According to patient's daughter, the patient has had darkened stools the past 2-3 days with associated lethargy.  Patient has been tolerating less orally.  Daughter describes mucous-containing stool mixed with bright red blood and darkened stool. + abd pain- relieves w BM   spoke to dgtr and  on the phone-obtained furthur hx  72 y/o F w/ hx of DM2, HTN, breast CA (dx in 2/2017) sp double mastectomy and LN resection  jan/2018-hx respiratory arrest 2/2 influenza, complicated by aspiration pna, PEA arrest x2 , CVA  w residual Rt leg weakness and rt eye visual loss, PE s/p tPA, ARF requiring HD, s/p trach and PEG tube,   5/18 peg and trach removed  late 2020- another CVA -neurologist- Dr Victorina ISAACS placed aug/21    Patient has no history of endoscopy or colonoscopy, as reported by the patient's daughter.  Patient notes lethargy, but denies any chest pain, shortness of breath, or other physical complaints.  No sick contacts or recent travel.

## 2021-12-29 NOTE — CONSULT NOTE ADULT - SUBJECTIVE AND OBJECTIVE BOX
Chief Complaint:  Patient is a 71y old  Female who presents with a chief complaint of bleeding per rectum (29 Dec 2021 13:18)      Date of service: 21 @ 22:04    HPI:    The patient is a 71 year old with hx of CVA with residual deficits, prior PEG s/p removal, HTN/DM who presented with rectal bleeding. She complains of abdominal discomfort intermittently.   THis has not happened previously.     Her GI is Dr. Lamb.  She has no prior colonoscopy.     Allergies:  doxycycline (Rash)  hydrALAZINE (Rash)  isoniazid (Rash)  NIFEdipine (Urticaria; Hives)  vitamin E (Short breath; Urticaria; Hives)      Home Medications:    Hospital Medications:  aspirin  chewable 81 milliGRAM(s) Oral daily  atorvastatin 10 milliGRAM(s) Oral at bedtime  carvedilol 25 milliGRAM(s) Oral every 12 hours  dextrose 40% Gel 15 Gram(s) Oral once  dextrose 5%. 1000 milliLiter(s) IV Continuous <Continuous>  dextrose 5%. 1000 milliLiter(s) IV Continuous <Continuous>  dextrose 50% Injectable 25 Gram(s) IV Push once  dextrose 50% Injectable 12.5 Gram(s) IV Push once  dextrose 50% Injectable 25 Gram(s) IV Push once  donepezil 10 milliGRAM(s) Oral at bedtime  glucagon  Injectable 1 milliGRAM(s) IntraMuscular once  insulin glargine Injectable (LANTUS) 6 Unit(s) SubCutaneous at bedtime  insulin lispro (ADMELOG) corrective regimen sliding scale   SubCutaneous three times a day before meals  insulin lispro (ADMELOG) corrective regimen sliding scale   SubCutaneous at bedtime  letrozole 2.5 milliGRAM(s) Oral daily  pantoprazole    Tablet 40 milliGRAM(s) Oral before breakfast      PMHX/PSHX:  Breast CA    Diabetes    Stroke    Cardiac arrest    HTN (hypertension)    No significant past surgical history    H/O mastectomy, bilateral        Family history:  No pertinent family history in first degree relatives        Social History:   Denies ethanol use.  Denies illicit drug use.    ROS:     General:  No wt loss, fevers, chills, night sweats, fatigue,   Eyes:  Good vision, no reported pain  ENT:  No sore throat, pain, runny nose, dysphagia  CV:  No pain, palpitations, hypo/hypertension  Resp:  No dyspnea, cough, tachypnea, wheezing  GI:  See HPI  :  No pain, bleeding, incontinence, nocturia  Muscle:  No pain, weakness  Neuro:  No weakness, tingling, memory problems  Psych:  No fatigue, insomnia, mood problems, depression  Endocrine:  No polyuria, polydipsia, cold/heat intolerance  Heme:  No petechiae, ecchymosis, easy bruisability  Integumentary:  No rash, edema      PHYSICAL EXAM:     GENERAL:  Appears stated age, well-groomed, well-nourished, no distress  HEENT:  NC/AT,  conjunctivae anicteric, clear and pink,   NECK: supple, trachea midline  CHEST:  Full & symmetric excursion, no increased effort, breath sounds clear  HEART:  Regular rhythm, no JVD  ABDOMEN:  Soft, non-tender, non-distended, normoactive bowel sounds,  no masses , no hepatosplenomegaly  EXTREMITIES:  no cyanosis,clubbing or edema  SKIN:  No rash, erythema, or, ecchymoses, no jaundice  NEURO:  Alert, non-focal, no asterixis  PSYCH: Appropriate affect, oriented to place and time  RECTAL: brown stool      Vital Signs:  Vital Signs Last 24 Hrs  T(C): 36.8 (29 Dec 2021 20:58), Max: 37.2 (29 Dec 2021 08:40)  T(F): 98.2 (29 Dec 2021 20:58), Max: 98.9 (29 Dec 2021 08:40)  HR: 88 (29 Dec 2021 20:58) (74 - 102)  BP: 189/82 (29 Dec 2021 20:58) (140/82 - 194/69)  BP(mean): --  RR: 18 (29 Dec 2021 20:58) (18 - 24)  SpO2: 95% (29 Dec 2021 20:58) (93% - 97%)  Daily Height in cm: 152.4 (29 Dec 2021 07:57)    Daily     LABS: Labs personally reviewed by me:                        10.0   7.44  )-----------( 247      ( 29 Dec 2021 19:07 )             30.8     12-    134<L>  |  102  |  23  ----------------------------<  117<H>  5.6<H>   |  19<L>  |  1.39<H>    Ca    9.0      29 Dec 2021 09:20  Mg     2.0         TPro  7.5  /  Alb  3.1<L>  /  TBili  0.3  /  DBili  x   /  AST  23  /  ALT  18  /  AlkPhos  67      LIVER FUNCTIONS - ( 29 Dec 2021 09:20 )  Alb: 3.1 g/dL / Pro: 7.5 g/dL / ALK PHOS: 67 U/L / ALT: 18 U/L / AST: 23 U/L / GGT: x           PT/INR - ( 29 Dec 2021 12:50 )   PT: 11.9 sec;   INR: 0.99 ratio         PTT - ( 29 Dec 2021 12:50 )  PTT:28.4 sec  Urinalysis Basic - ( 29 Dec 2021 10:14 )    Color: Colorless / Appearance: Clear / S.010 / pH: x  Gluc: x / Ketone: Negative  / Bili: Negative / Urobili: Negative   Blood: x / Protein: 300 mg/dL / Nitrite: Negative   Leuk Esterase: Negative / RBC: 36 /hpf / WBC 3 /HPF   Sq Epi: x / Non Sq Epi: 2 /hpf / Bacteria: Negative      Amylase Serum--      Lipase serum24       Ammonia--      Imaging personally reviewed by me:

## 2021-12-30 LAB
A1C WITH ESTIMATED AVERAGE GLUCOSE RESULT: 7.4 % — HIGH (ref 4–5.6)
CULTURE RESULTS: SIGNIFICANT CHANGE UP
ESTIMATED AVERAGE GLUCOSE: 166 MG/DL — HIGH (ref 68–114)
GLUCOSE BLDC GLUCOMTR-MCNC: 106 MG/DL — HIGH (ref 70–99)
GLUCOSE BLDC GLUCOMTR-MCNC: 153 MG/DL — HIGH (ref 70–99)
GLUCOSE BLDC GLUCOMTR-MCNC: 206 MG/DL — HIGH (ref 70–99)
GLUCOSE BLDC GLUCOMTR-MCNC: 319 MG/DL — HIGH (ref 70–99)
SARS-COV-2 RNA SPEC QL NAA+PROBE: SIGNIFICANT CHANGE UP
SPECIMEN SOURCE: SIGNIFICANT CHANGE UP

## 2021-12-30 RX ORDER — CIPROFLOXACIN LACTATE 400MG/40ML
400 VIAL (ML) INTRAVENOUS EVERY 12 HOURS
Refills: 0 | Status: DISCONTINUED | OUTPATIENT
Start: 2021-12-31 | End: 2022-01-01

## 2021-12-30 RX ORDER — HEPARIN SODIUM 5000 [USP'U]/ML
5000 INJECTION INTRAVENOUS; SUBCUTANEOUS EVERY 12 HOURS
Refills: 0 | Status: DISCONTINUED | OUTPATIENT
Start: 2021-12-30 | End: 2022-01-05

## 2021-12-30 RX ORDER — CIPROFLOXACIN LACTATE 400MG/40ML
VIAL (ML) INTRAVENOUS
Refills: 0 | Status: DISCONTINUED | OUTPATIENT
Start: 2021-12-30 | End: 2022-01-01

## 2021-12-30 RX ORDER — METRONIDAZOLE 500 MG
500 TABLET ORAL EVERY 8 HOURS
Refills: 0 | Status: DISCONTINUED | OUTPATIENT
Start: 2021-12-30 | End: 2022-01-01

## 2021-12-30 RX ORDER — CIPROFLOXACIN LACTATE 400MG/40ML
400 VIAL (ML) INTRAVENOUS ONCE
Refills: 0 | Status: COMPLETED | OUTPATIENT
Start: 2021-12-30 | End: 2021-12-30

## 2021-12-30 RX ORDER — METRONIDAZOLE 500 MG
TABLET ORAL
Refills: 0 | Status: DISCONTINUED | OUTPATIENT
Start: 2021-12-30 | End: 2021-12-30

## 2021-12-30 RX ORDER — METRONIDAZOLE 500 MG
500 TABLET ORAL ONCE
Refills: 0 | Status: COMPLETED | OUTPATIENT
Start: 2021-12-30 | End: 2021-12-30

## 2021-12-30 RX ORDER — METRONIDAZOLE 500 MG
TABLET ORAL
Refills: 0 | Status: DISCONTINUED | OUTPATIENT
Start: 2021-12-30 | End: 2022-01-01

## 2021-12-30 RX ADMIN — INSULIN GLARGINE 6 UNIT(S): 100 INJECTION, SOLUTION SUBCUTANEOUS at 22:01

## 2021-12-30 RX ADMIN — Medication 200 MILLIGRAM(S): at 12:32

## 2021-12-30 RX ADMIN — Medication 2: at 12:26

## 2021-12-30 RX ADMIN — HEPARIN SODIUM 5000 UNIT(S): 5000 INJECTION INTRAVENOUS; SUBCUTANEOUS at 17:13

## 2021-12-30 RX ADMIN — DONEPEZIL HYDROCHLORIDE 10 MILLIGRAM(S): 10 TABLET, FILM COATED ORAL at 22:00

## 2021-12-30 RX ADMIN — CARVEDILOL PHOSPHATE 25 MILLIGRAM(S): 80 CAPSULE, EXTENDED RELEASE ORAL at 05:11

## 2021-12-30 RX ADMIN — Medication 2: at 22:00

## 2021-12-30 RX ADMIN — PANTOPRAZOLE SODIUM 40 MILLIGRAM(S): 20 TABLET, DELAYED RELEASE ORAL at 05:11

## 2021-12-30 RX ADMIN — Medication 500 MILLIGRAM(S): at 22:01

## 2021-12-30 RX ADMIN — Medication 81 MILLIGRAM(S): at 12:27

## 2021-12-30 RX ADMIN — CARVEDILOL PHOSPHATE 25 MILLIGRAM(S): 80 CAPSULE, EXTENDED RELEASE ORAL at 15:19

## 2021-12-30 RX ADMIN — ATORVASTATIN CALCIUM 10 MILLIGRAM(S): 80 TABLET, FILM COATED ORAL at 22:00

## 2021-12-30 RX ADMIN — LETROZOLE 2.5 MILLIGRAM(S): 2.5 TABLET, FILM COATED ORAL at 17:12

## 2021-12-30 RX ADMIN — Medication 500 MILLIGRAM(S): at 15:19

## 2021-12-30 RX ADMIN — Medication 1: at 08:26

## 2021-12-30 NOTE — PROGRESS NOTE ADULT - ASSESSMENT
71 year old female with rectal bleeding    1. Rectal bleeding. Pt has some debility from stroke, would not be able to tolerate prep. May be self limited colitis. Pt family wants to hold off on invasive procedures. Rectal exam w brown stool right now.  -Proctocolitis on CT, likely infectious vs. stercoral colitis, recommend IV Cipro and Flagyl   -GI PCR    2. CVA    3. Anemia, stools now brown  -hgb stable  -monitor for GI bleeding  -trend CBCs        Advanced care planning forms were discussed. Code status including forceful chest compressions, defibrillation and intubation were discussed. The risks benefits and alternatives to pertinent gastrointestinal procedures and interventions were discussed in detail and all questions were answered. Duration: 15 Minutes.      Santos Lux M.D.   Gastroenterology and Hepatology  [address] [place], [place]  Office: [phone]  Cell: [phone] 71 year old female with rectal bleeding    1. Rectal bleeding. Pt has some debility from stroke, would not be able to tolerate prep. May be self limited colitis. Pt family wants to hold off on invasive procedures. Rectal exam w brown stool right now.  -Proctocolitis on CT, likely infectious vs. stercoral colitis, recommend Cipro and Flagyl   -GI PCR    2. CVA    3. Anemia, stools now brown  -hgb stable  -monitor for GI bleeding  -trend CBCs        Advanced care planning forms were discussed. Code status including forceful chest compressions, defibrillation and intubation were discussed. The risks benefits and alternatives to pertinent gastrointestinal procedures and interventions were discussed in detail and all questions were answered. Duration: 15 Minutes.      Santos Lux M.D.   Gastroenterology and Hepatology  [address] [place], [place]  Office: [phone]  Cell: [phone] 71 year old female with rectal bleeding    1. Rectal bleeding. Pt has some debility from stroke, would not be able to tolerate prep. May be self limited colitis. Pt family wants to hold off on invasive procedures. Rectal exam w brown stool right now.  -Proctocolitis on CT, likely infectious vs. stercoral colitis, recommend Cipro and Flagyl   -GI PCR    2. CVA    3. Anemia, stools now brown  -hgb stable  -monitor for GI bleeding  -trend CBCs        Santos Lux M.D.   Gastroenterology and Hepatology  [address] [place], [place]  Office: [phone]  Cell: [phone]

## 2021-12-30 NOTE — PROGRESS NOTE ADULT - ASSESSMENT
70 y/o F w/ hx of DM2, HTN, breast CA (dx in 2/2017) sp b/l  mastectomy and LN resection  jan/2018-hx respiratory arrest 2/2 influenza, complicated by aspiration pna, PEA arrest x2 , CVA w residual Rt leg weakness and rt eye visual loss, PE s/p tPA, ARF requiring HD, s/p trach and PEG tube, 5/18 peg and trach removed  late 2020- 2nd CVA -neurologist- Dr Prajapati , ILR placed aug/21  aw hematochesia- stool mixed w blood and mucus x5 since last night, +abd pain relieved w BM  hx of hemorrhoidectomy/fissurectomy>20yrs ago    #lower GIbleed  Hematochesia  r/o colitis  CT a/p proctitis   Gi cs appreciated-fu recs  hold off on invasive procedures for now  monitor CBC, active t&S  to transfuse for symptoms or hb <8      #CVA hx  sp ILR placed recently  monitor-no events  resume asa, statin    #Cards- ?cardiac arrest hx  last Echo 2018 reveiwed  stable  sees Dr Dunn    #HTN  resume carvedilol    #DM-2   dc humulin and metformin-home meds  start lantus and correction scale  a1c    #Breast ca sp b/l mastectomy  resume letrazole    #DVT ppx- venodynes  hold ac dt gi bleed    [facility]  [phone]

## 2021-12-31 LAB
GLUCOSE BLDC GLUCOMTR-MCNC: 155 MG/DL — HIGH (ref 70–99)
GLUCOSE BLDC GLUCOMTR-MCNC: 181 MG/DL — HIGH (ref 70–99)
GLUCOSE BLDC GLUCOMTR-MCNC: 227 MG/DL — HIGH (ref 70–99)
GLUCOSE BLDC GLUCOMTR-MCNC: 390 MG/DL — HIGH (ref 70–99)
HCT VFR BLD CALC: 33.5 % — LOW (ref 34.5–45)
HGB BLD-MCNC: 10.5 G/DL — LOW (ref 11.5–15.5)
MCHC RBC-ENTMCNC: 29.2 PG — SIGNIFICANT CHANGE UP (ref 27–34)
MCHC RBC-ENTMCNC: 31.3 GM/DL — LOW (ref 32–36)
MCV RBC AUTO: 93.1 FL — SIGNIFICANT CHANGE UP (ref 80–100)
NRBC # BLD: 0 /100 WBCS — SIGNIFICANT CHANGE UP (ref 0–0)
PLATELET # BLD AUTO: 194 K/UL — SIGNIFICANT CHANGE UP (ref 150–400)
RBC # BLD: 3.6 M/UL — LOW (ref 3.8–5.2)
RBC # FLD: 13.7 % — SIGNIFICANT CHANGE UP (ref 10.3–14.5)
SARS-COV-2 RNA SPEC QL NAA+PROBE: SIGNIFICANT CHANGE UP
WBC # BLD: 6.64 K/UL — SIGNIFICANT CHANGE UP (ref 3.8–10.5)
WBC # FLD AUTO: 6.64 K/UL — SIGNIFICANT CHANGE UP (ref 3.8–10.5)

## 2021-12-31 PROCEDURE — 71045 X-RAY EXAM CHEST 1 VIEW: CPT | Mod: 26

## 2021-12-31 RX ORDER — INSULIN GLARGINE 100 [IU]/ML
10 INJECTION, SOLUTION SUBCUTANEOUS AT BEDTIME
Refills: 0 | Status: DISCONTINUED | OUTPATIENT
Start: 2021-12-31 | End: 2022-01-01

## 2021-12-31 RX ORDER — DOXAZOSIN MESYLATE 4 MG
6 TABLET ORAL
Refills: 0 | Status: DISCONTINUED | OUTPATIENT
Start: 2021-12-31 | End: 2022-01-03

## 2021-12-31 RX ORDER — ISOSORBIDE DINITRATE 5 MG/1
10 TABLET ORAL THREE TIMES A DAY
Refills: 0 | Status: DISCONTINUED | OUTPATIENT
Start: 2021-12-31 | End: 2021-12-31

## 2021-12-31 RX ADMIN — Medication 200 MILLIGRAM(S): at 17:20

## 2021-12-31 RX ADMIN — CARVEDILOL PHOSPHATE 25 MILLIGRAM(S): 80 CAPSULE, EXTENDED RELEASE ORAL at 16:14

## 2021-12-31 RX ADMIN — Medication 1: at 17:19

## 2021-12-31 RX ADMIN — PANTOPRAZOLE SODIUM 40 MILLIGRAM(S): 20 TABLET, DELAYED RELEASE ORAL at 05:32

## 2021-12-31 RX ADMIN — Medication 81 MILLIGRAM(S): at 12:28

## 2021-12-31 RX ADMIN — HEPARIN SODIUM 5000 UNIT(S): 5000 INJECTION INTRAVENOUS; SUBCUTANEOUS at 17:19

## 2021-12-31 RX ADMIN — LETROZOLE 2.5 MILLIGRAM(S): 2.5 TABLET, FILM COATED ORAL at 12:28

## 2021-12-31 RX ADMIN — CARVEDILOL PHOSPHATE 25 MILLIGRAM(S): 80 CAPSULE, EXTENDED RELEASE ORAL at 05:31

## 2021-12-31 RX ADMIN — Medication 500 MILLIGRAM(S): at 05:32

## 2021-12-31 RX ADMIN — INSULIN GLARGINE 10 UNIT(S): 100 INJECTION, SOLUTION SUBCUTANEOUS at 22:04

## 2021-12-31 RX ADMIN — Medication 2: at 08:27

## 2021-12-31 RX ADMIN — Medication 6 MILLIGRAM(S): at 12:28

## 2021-12-31 RX ADMIN — DONEPEZIL HYDROCHLORIDE 10 MILLIGRAM(S): 10 TABLET, FILM COATED ORAL at 21:19

## 2021-12-31 RX ADMIN — Medication 6 MILLIGRAM(S): at 21:32

## 2021-12-31 RX ADMIN — ATORVASTATIN CALCIUM 10 MILLIGRAM(S): 80 TABLET, FILM COATED ORAL at 21:19

## 2021-12-31 RX ADMIN — Medication 500 MILLIGRAM(S): at 21:18

## 2021-12-31 RX ADMIN — Medication 1: at 12:28

## 2021-12-31 RX ADMIN — Medication 200 MILLIGRAM(S): at 05:37

## 2021-12-31 RX ADMIN — HEPARIN SODIUM 5000 UNIT(S): 5000 INJECTION INTRAVENOUS; SUBCUTANEOUS at 05:37

## 2021-12-31 RX ADMIN — Medication 3: at 22:07

## 2021-12-31 RX ADMIN — Medication 500 MILLIGRAM(S): at 13:16

## 2021-12-31 NOTE — PHYSICAL THERAPY INITIAL EVALUATION ADULT - BALANCE TRAINING, PT EVAL
GOAL: Pt will increase static/ dynamic standing balance to [place] with rolling walker to improve safety with functional activities in 4 weeks.

## 2021-12-31 NOTE — PROGRESS NOTE ADULT - ASSESSMENT
70 y/o F w/ hx of DM2, HTN, breast CA (dx in 2/2017) sp b/l  mastectomy and LN resection  jan/2018-hx respiratory arrest 2/2 influenza, complicated by aspiration pna, PEA arrest x2 , CVA w residual Rt leg weakness and rt eye visual loss, PE s/p tPA, ARF requiring HD, s/p trach and PEG tube, 5/18 peg and trach removed  late 2020- 2nd CVA -neurologist- Dr Prajapati , ILR placed aug/21  aw hematochesia- stool mixed w blood and mucus x5 since last night, +abd pain relieved w BM  hx of hemorrhoidectomy/fissurectomy>20yrs ago    #lower GIbleed  Hematochesia- resolved  stable hb  r/o colitis  CT a/p proctitis   Gi cs appreciated-fu recs- started cipro and flagyl  hold off on invasive procedures for now, family agreeable  advance diet as tolerated  monitor CBC, active t&S  to transfuse for symptoms or hb <8      #CVA hx  sp ILR placed recently  monitor-no events  resume asa, statin    #Cards- cardiac arrest hx  last Echo 2018 reviewed  stable  sees Dr Dunn    #HTN  resume home carvedilol  cardura restarted for uncontrolled sbp    #DM-2   dc humulin and metformin-home meds  start lantus and correction scale  a1c      #Breast ca sp b/l mastectomy  resume letrazole    #DVT ppx- venodynes  hold ac dt gi bleed  will start AC given stable hb and no furthur active gi bleed    [facility]  [phone]

## 2021-12-31 NOTE — PHYSICAL THERAPY INITIAL EVALUATION ADULT - PERTINENT HX OF CURRENT PROBLEM, REHAB EVAL
72 yo F h/o PEA arrest x2 , CVA w residual Rt leg weakness and rt eye visual loss, PE s/p tPA, ARF requiring HD, s/p trach and PEG tube, 5/18 peg and trach removed, now p/w rectal bleeding. According to pt's dtr, pt has had darkened stools the past 2-3 days with associated lethargy.  Pt has been tolerating less orally.  Dtr describes mucous-containing stool mixed with bright red blood and darkened stool. + abd pain- relieves w BM.

## 2021-12-31 NOTE — PHYSICAL THERAPY INITIAL EVALUATION ADULT - PRECAUTIONS/LIMITATIONS, REHAB EVAL
CONT: CT Abd 12/29: Proctitis. Age-indeterminate L1 vertebral compression deformity. Trace right pleural effusion. XRay Chest 12/29: Chronic patchy airspace opacities in the upper lobes. No new consolidation is seen. CONT: CT Abd 12/29: Proctitis. Age-indeterminate L1 vertebral compression deformity. Trace right pleural effusion. XRay Chest 12/29: Chronic patchy airspace opacities in the upper lobes. No new consolidation is seen./fall precautions

## 2021-12-31 NOTE — PROGRESS NOTE ADULT - ASSESSMENT
71 year old female with rectal bleeding    1. Rectal bleeding. Pt has some debility from stroke, would not be able to tolerate prep. May be self limited colitis. Pt family wants to hold off on invasive procedures. Rectal exam w brown stool right now.  -Proctocolitis on CT, likely infectious vs. stercoral colitis, continue Cipro and Flagyl   -GI PCR not detected    2. CVA    3. Anemia, stools now brown  -hgb stable, hgb 10.5 today  -monitor for GI bleeding  -trend CBCs      Attending supervision statement: I have personally seen and examined the patient. I fully participated in the care of this patient. I have made amendments to the documentation where necessary, and agree with the history, physical exam, and plan as outlined by the ACP.      Santos Lux M.D.   Gastroenterology and Hepatology  [address] [place], [place]  Office: [phone]  Cell: [phone]

## 2021-12-31 NOTE — PHYSICAL THERAPY INITIAL EVALUATION ADULT - ADDITIONAL COMMENTS
pt lives in 2nd floor apt with family +chair lift. Prior to admission, pt has assist with all functional mobility. Per dtr, pt ambulates short distance +assist and chair close.

## 2021-12-31 NOTE — CHART NOTE - NSCHARTNOTEFT_GEN_A_CORE
Spoke with Dr. Lux, GI     Plan:  May advance diet as tolerated  Spoke with daughter pt tolerates regular diet but requests Vegetarian with dairy  As per GI pt is cleared from their standpoint.  CXR done pending read  COVID swab sent pending results 2/2 cough    Socorro Rowley, [facility]  Medicine

## 2021-12-31 NOTE — CHART NOTE - NSCHARTNOTEFT_GEN_A_CORE
Called by RN for elevated systolic BP to 190.  Pt has multiple allergies.      Vital Signs Last 24 Hrs  T(C): 36.6 (31 Dec 2021 08:22), Max: 37.3 (31 Dec 2021 00:34)  T(F): 97.9 (31 Dec 2021 08:22), Max: 99.1 (31 Dec 2021 00:34)  HR: 72 (31 Dec 2021 08:22) (64 - 77)  BP: 190/80 (31 Dec 2021 11:05) (144/68 - 190/80)  BP(mean): --  RR: 18 (31 Dec 2021 08:22) (18 - 20)  SpO2: 93% (31 Dec 2021 08:22) (93% - 97%)    Plan:  Continue her Cardura (home medication)  Will start Isordil 10mg q8H 3 times a day with hold parameters SBP <120  Continue to monitor SBP as per protocol    Socorro Rowley [facility]  Cardiology Called by RN for elevated systolic BP to 190.  Pt has multiple allergies.      Vital Signs Last 24 Hrs  T(C): 36.6 (31 Dec 2021 08:22), Max: 37.3 (31 Dec 2021 00:34)  T(F): 97.9 (31 Dec 2021 08:22), Max: 99.1 (31 Dec 2021 00:34)  HR: 72 (31 Dec 2021 08:22) (64 - 77)  BP: 190/80 (31 Dec 2021 11:05) (144/68 - 190/80)  BP(mean): --  RR: 18 (31 Dec 2021 08:22) (18 - 20)  SpO2: 93% (31 Dec 2021 08:22) (93% - 97%)    Plan:  Continue her Cardura (home medication)  Will start Isordil 10mg q8H 3 times a day with hold parameters SBP <120  Continue to monitor SBP as per protocol    Socorro Rowley [facility]  Medicine    Addendum - Spoke with Dr. Carrasco will d/c Isordil and wait to see effectiveness of her home dose of Cardura effects on BP.  Pt with +cough will get COVID swab and CXR to r/o Covid or aspiration PNA.  If continues to cough, pt may need a speech and swallow.  Will confirm home diet with daughter.

## 2022-01-01 ENCOUNTER — RX RENEWAL (OUTPATIENT)
Age: 72
End: 2022-01-01

## 2022-01-01 LAB
ANION GAP SERPL CALC-SCNC: 12 MMOL/L — SIGNIFICANT CHANGE UP (ref 5–17)
ANION GAP SERPL CALC-SCNC: 13 MMOL/L — SIGNIFICANT CHANGE UP (ref 5–17)
APPEARANCE UR: CLEAR — SIGNIFICANT CHANGE UP
BACTERIA # UR AUTO: NEGATIVE — SIGNIFICANT CHANGE UP
BILIRUB UR-MCNC: NEGATIVE — SIGNIFICANT CHANGE UP
BUN SERPL-MCNC: 22 MG/DL — SIGNIFICANT CHANGE UP (ref 7–23)
BUN SERPL-MCNC: 23 MG/DL — SIGNIFICANT CHANGE UP (ref 7–23)
CALCIUM SERPL-MCNC: 8.4 MG/DL — SIGNIFICANT CHANGE UP (ref 8.4–10.5)
CALCIUM SERPL-MCNC: 9.3 MG/DL — SIGNIFICANT CHANGE UP (ref 8.4–10.5)
CHLORIDE SERPL-SCNC: 103 MMOL/L — SIGNIFICANT CHANGE UP (ref 96–108)
CHLORIDE SERPL-SCNC: 104 MMOL/L — SIGNIFICANT CHANGE UP (ref 96–108)
CO2 SERPL-SCNC: 20 MMOL/L — LOW (ref 22–31)
CO2 SERPL-SCNC: 20 MMOL/L — LOW (ref 22–31)
COLOR SPEC: YELLOW — SIGNIFICANT CHANGE UP
CREAT SERPL-MCNC: 1.63 MG/DL — HIGH (ref 0.5–1.3)
CREAT SERPL-MCNC: 1.69 MG/DL — HIGH (ref 0.5–1.3)
DIFF PNL FLD: ABNORMAL
EPI CELLS # UR: 2 /HPF — SIGNIFICANT CHANGE UP
GLUCOSE BLDC GLUCOMTR-MCNC: 207 MG/DL — HIGH (ref 70–99)
GLUCOSE BLDC GLUCOMTR-MCNC: 228 MG/DL — HIGH (ref 70–99)
GLUCOSE BLDC GLUCOMTR-MCNC: 314 MG/DL — HIGH (ref 70–99)
GLUCOSE BLDC GLUCOMTR-MCNC: 384 MG/DL — HIGH (ref 70–99)
GLUCOSE BLDC GLUCOMTR-MCNC: 404 MG/DL — HIGH (ref 70–99)
GLUCOSE BLDC GLUCOMTR-MCNC: 408 MG/DL — HIGH (ref 70–99)
GLUCOSE SERPL-MCNC: 175 MG/DL — HIGH (ref 70–99)
GLUCOSE SERPL-MCNC: 343 MG/DL — HIGH (ref 70–99)
GLUCOSE UR QL: ABNORMAL
HCT VFR BLD CALC: 30.7 % — LOW (ref 34.5–45)
HGB BLD-MCNC: 9.6 G/DL — LOW (ref 11.5–15.5)
HYALINE CASTS # UR AUTO: 1 /LPF — SIGNIFICANT CHANGE UP (ref 0–2)
KETONES UR-MCNC: NEGATIVE — SIGNIFICANT CHANGE UP
LEUKOCYTE ESTERASE UR-ACNC: NEGATIVE — SIGNIFICANT CHANGE UP
MCHC RBC-ENTMCNC: 28.7 PG — SIGNIFICANT CHANGE UP (ref 27–34)
MCHC RBC-ENTMCNC: 31.3 GM/DL — LOW (ref 32–36)
MCV RBC AUTO: 91.9 FL — SIGNIFICANT CHANGE UP (ref 80–100)
NITRITE UR-MCNC: NEGATIVE — SIGNIFICANT CHANGE UP
NRBC # BLD: 0 /100 WBCS — SIGNIFICANT CHANGE UP (ref 0–0)
PH UR: 7 — SIGNIFICANT CHANGE UP (ref 5–8)
PLATELET # BLD AUTO: 234 K/UL — SIGNIFICANT CHANGE UP (ref 150–400)
POTASSIUM SERPL-MCNC: 4.7 MMOL/L — SIGNIFICANT CHANGE UP (ref 3.5–5.3)
POTASSIUM SERPL-MCNC: 4.8 MMOL/L — SIGNIFICANT CHANGE UP (ref 3.5–5.3)
POTASSIUM SERPL-SCNC: 4.7 MMOL/L — SIGNIFICANT CHANGE UP (ref 3.5–5.3)
POTASSIUM SERPL-SCNC: 4.8 MMOL/L — SIGNIFICANT CHANGE UP (ref 3.5–5.3)
PROT UR-MCNC: ABNORMAL
RBC # BLD: 3.34 M/UL — LOW (ref 3.8–5.2)
RBC # FLD: 13.5 % — SIGNIFICANT CHANGE UP (ref 10.3–14.5)
RBC CASTS # UR COMP ASSIST: 3 /HPF — SIGNIFICANT CHANGE UP (ref 0–4)
SARS-COV-2 RNA SPEC QL NAA+PROBE: SIGNIFICANT CHANGE UP
SODIUM SERPL-SCNC: 136 MMOL/L — SIGNIFICANT CHANGE UP (ref 135–145)
SODIUM SERPL-SCNC: 136 MMOL/L — SIGNIFICANT CHANGE UP (ref 135–145)
SP GR SPEC: 1.02 — SIGNIFICANT CHANGE UP (ref 1.01–1.02)
UROBILINOGEN FLD QL: NEGATIVE — SIGNIFICANT CHANGE UP
WBC # BLD: 4.91 K/UL — SIGNIFICANT CHANGE UP (ref 3.8–10.5)
WBC # FLD AUTO: 4.91 K/UL — SIGNIFICANT CHANGE UP (ref 3.8–10.5)
WBC UR QL: 2 /HPF — SIGNIFICANT CHANGE UP (ref 0–5)

## 2022-01-01 RX ORDER — SODIUM CHLORIDE 9 MG/ML
1000 INJECTION INTRAMUSCULAR; INTRAVENOUS; SUBCUTANEOUS
Refills: 0 | Status: DISCONTINUED | OUTPATIENT
Start: 2022-01-01 | End: 2022-01-02

## 2022-01-01 RX ORDER — SODIUM CHLORIDE 9 MG/ML
1000 INJECTION INTRAMUSCULAR; INTRAVENOUS; SUBCUTANEOUS
Refills: 0 | Status: DISCONTINUED | OUTPATIENT
Start: 2022-01-01 | End: 2022-01-01

## 2022-01-01 RX ORDER — CIPROFLOXACIN LACTATE 400MG/40ML
250 VIAL (ML) INTRAVENOUS
Refills: 0 | Status: DISCONTINUED | OUTPATIENT
Start: 2022-01-01 | End: 2022-01-05

## 2022-01-01 RX ORDER — ONDANSETRON 8 MG/1
4 TABLET, FILM COATED ORAL EVERY 8 HOURS
Refills: 0 | Status: DISCONTINUED | OUTPATIENT
Start: 2022-01-01 | End: 2022-01-05

## 2022-01-01 RX ORDER — METRONIDAZOLE 500 MG
500 TABLET ORAL EVERY 8 HOURS
Refills: 0 | Status: DISCONTINUED | OUTPATIENT
Start: 2022-01-01 | End: 2022-01-05

## 2022-01-01 RX ORDER — INSULIN GLARGINE 100 [IU]/ML
16 INJECTION, SOLUTION SUBCUTANEOUS AT BEDTIME
Refills: 0 | Status: DISCONTINUED | OUTPATIENT
Start: 2022-01-01 | End: 2022-01-05

## 2022-01-01 RX ADMIN — LETROZOLE 2.5 MILLIGRAM(S): 2.5 TABLET, FILM COATED ORAL at 11:44

## 2022-01-01 RX ADMIN — DONEPEZIL HYDROCHLORIDE 10 MILLIGRAM(S): 10 TABLET, FILM COATED ORAL at 21:58

## 2022-01-01 RX ADMIN — Medication 200 MILLIGRAM(S): at 08:40

## 2022-01-01 RX ADMIN — ONDANSETRON 4 MILLIGRAM(S): 8 TABLET, FILM COATED ORAL at 11:07

## 2022-01-01 RX ADMIN — HEPARIN SODIUM 5000 UNIT(S): 5000 INJECTION INTRAVENOUS; SUBCUTANEOUS at 05:53

## 2022-01-01 RX ADMIN — INSULIN GLARGINE 16 UNIT(S): 100 INJECTION, SOLUTION SUBCUTANEOUS at 21:55

## 2022-01-01 RX ADMIN — ATORVASTATIN CALCIUM 10 MILLIGRAM(S): 80 TABLET, FILM COATED ORAL at 21:58

## 2022-01-01 RX ADMIN — Medication 500 MILLIGRAM(S): at 05:52

## 2022-01-01 RX ADMIN — PANTOPRAZOLE SODIUM 40 MILLIGRAM(S): 20 TABLET, DELAYED RELEASE ORAL at 05:52

## 2022-01-01 RX ADMIN — CARVEDILOL PHOSPHATE 25 MILLIGRAM(S): 80 CAPSULE, EXTENDED RELEASE ORAL at 16:50

## 2022-01-01 RX ADMIN — Medication 6 MILLIGRAM(S): at 21:58

## 2022-01-01 RX ADMIN — Medication 500 MILLIGRAM(S): at 13:13

## 2022-01-01 RX ADMIN — Medication 2: at 08:40

## 2022-01-01 RX ADMIN — SODIUM CHLORIDE 80 MILLILITER(S): 9 INJECTION INTRAMUSCULAR; INTRAVENOUS; SUBCUTANEOUS at 17:05

## 2022-01-01 RX ADMIN — HEPARIN SODIUM 5000 UNIT(S): 5000 INJECTION INTRAVENOUS; SUBCUTANEOUS at 17:05

## 2022-01-01 RX ADMIN — Medication 4: at 21:56

## 2022-01-01 RX ADMIN — Medication 4: at 17:04

## 2022-01-01 RX ADMIN — CARVEDILOL PHOSPHATE 25 MILLIGRAM(S): 80 CAPSULE, EXTENDED RELEASE ORAL at 03:58

## 2022-01-01 RX ADMIN — Medication 2: at 12:41

## 2022-01-01 RX ADMIN — Medication 6 MILLIGRAM(S): at 11:43

## 2022-01-01 RX ADMIN — Medication 500 MILLIGRAM(S): at 21:58

## 2022-01-01 RX ADMIN — Medication 81 MILLIGRAM(S): at 11:44

## 2022-01-01 NOTE — DIETITIAN INITIAL EVALUATION ADULT. - CHIEF COMPLAINT
Per chart ""72 y/o F w/ hx of DM2, HTN, breast CA (dx in 2/2017) sp b/l  mastectomy and LN resection  jan/2018-hx respiratory arrest 2/2 influenza, complicated by aspiration pna, PEA arrest x2 , CVA w residual Rt leg weakness and rt eye visual loss, PE s/p tPA, ARF requiring HD, s/p trach and PEG tube, 5/18 peg and trach removed. Hematochesia- stool mixed w blood and mucus x5 since last night, +abd pain relieved w BM  hx of hemorrhoidectomy/fissurectomy>20yrs ago"" Per chart ""72 y/o F w/ hx of DM2, HTN, breast CA (dx in 2/2017) sp b/l  mastectomy and LN resection jan/2018-hx respiratory arrest 2/2 influenza, complicated by aspiration pna, PEA arrest x2 , CVA w residual Rt leg weakness and rt eye visual loss, PE s/p tPA, ARF requiring HD, s/p trach and PEG tube, 5/18 peg and trach removed. Hematochesia- stool mixed w blood and mucus x5 since last night, +abd pain relieved w BM"

## 2022-01-01 NOTE — DIETITIAN INITIAL EVALUATION ADULT. - ORAL INTAKE PTA/DIET HISTORY
Pt has good appetite/PO intake at home, requires total feeding assistance at meal times. Therapeutic restrictions/modifications: lactovegetarian, low sodium, carbohydrate portion control, limits concentrated sweets. Micronutrient supplementation: iron, multivitamin, vitamin D3, Vitamin B12. Protein-energy supplementation: none. No difficulty chewing, notes sometimes holds liquids in mouth and needs to be reminded to swallow. Pt has good appetite/PO intake at baseline, requires total feeding assistance at meal times. Reports decreased PO intake x 2-3 days PTA. Therapeutic restrictions/modifications: lactovegetarian, low sodium, carbohydrate portion control, limits concentrated sweets. Micronutrient supplementation: iron, multivitamin, vitamin D3, Vitamin B12. Protein-energy supplementation: none. No difficulty chewing, notes sometimes holds liquids in mouth and needs to be reminded to swallow.

## 2022-01-01 NOTE — DIETITIAN INITIAL EVALUATION ADULT. - OTHER CALCULATIONS
Estimated needs calculated using kg. Estimated needs calculated using adjusted body weight (IBW + (25% * [CBW-IBW]) 49.6 kg.

## 2022-01-01 NOTE — DIETITIAN INITIAL EVALUATION ADULT. - ADD RECOMMEND
1) Continue current diet as ordered/tolerated. 2) Add Promote 3x/day. 3) Add multivitamin to optimize micronutrient intake. 4) Provide total feeding assistance at meal time. Encourage adequate consumption of meals/supplements to optimize protein-energy intake. 5) Diet education reviewed, reinforce as needed. 6) Monitor PO intake/tolerance, weights, labs, hydration status, skin integrity

## 2022-01-01 NOTE — DIETITIAN INITIAL EVALUATION ADULT. - PERTINENT MEDS FT
MEDICATIONS  (STANDING):  aspirin  chewable 81 milliGRAM(s) Oral daily  atorvastatin 10 milliGRAM(s) Oral at bedtime  carvedilol 25 milliGRAM(s) Oral every 12 hours  ciprofloxacin   IVPB      ciprofloxacin   IVPB 400 milliGRAM(s) IV Intermittent every 12 hours  dextrose 40% Gel 15 Gram(s) Oral once  dextrose 5%. 1000 milliLiter(s) (50 mL/Hr) IV Continuous <Continuous>  dextrose 5%. 1000 milliLiter(s) (100 mL/Hr) IV Continuous <Continuous>  dextrose 50% Injectable 25 Gram(s) IV Push once  dextrose 50% Injectable 12.5 Gram(s) IV Push once  dextrose 50% Injectable 25 Gram(s) IV Push once  donepezil 10 milliGRAM(s) Oral at bedtime  doxazosin 6 milliGRAM(s) Oral <User Schedule>  glucagon  Injectable 1 milliGRAM(s) IntraMuscular once  heparin   Injectable 5000 Unit(s) SubCutaneous every 12 hours  insulin glargine Injectable (LANTUS) 10 Unit(s) SubCutaneous at bedtime  insulin lispro (ADMELOG) corrective regimen sliding scale   SubCutaneous three times a day before meals  insulin lispro (ADMELOG) corrective regimen sliding scale   SubCutaneous at bedtime  letrozole 2.5 milliGRAM(s) Oral daily  metroNIDAZOLE    Tablet      metroNIDAZOLE    Tablet 500 milliGRAM(s) Oral every 8 hours  pantoprazole    Tablet 40 milliGRAM(s) Oral before breakfast    MEDICATIONS  (PRN):

## 2022-01-01 NOTE — DIETITIAN INITIAL EVALUATION ADULT. - OTHER INFO
Pt noted with fair appetite/PO intake, <75% of meals consumed since diet advanced. Amenable to receiving oral nutritional supplement to optimize PO intake: Promote 3x/day. Food preferences obtained; RD to honor as able.     GI: No overt signs of GI distress reported. Last BM was   , denies diarrhea/constipation. Bowel regimen:     UBW: pounds x ? months ago, denies weight changes. Current admission weight of: X pounds (dosing 10/09). Indicates Pt is wt stable at this time. RD to continue to monitor weight trends as available/able. Pt noted with poor appetite/PO intake per team, <75% of meals consumed since diet advanced. Family amenable to Pt receiving oral nutritional supplement to optimize PO intake: Promote 3x/day. Food preferences obtained; RD to honor as able.     GI: No overt signs of GI distress reported. Last BM was today 01/01 , no diarrhea/constipation reported. Bowel regimen: none.    -135 pounds x 1 year ago, family endorses weight likely weight gain. Current admission weight of 175 pounds (dosing 12/29) ? accuracy. RD took bed scale weight at time of visit, 138 pounds. Indicates Pt is weight stable at this time. RD to continue to monitor weight trends as available/able.    Hx of T2DM managed with Humalog (15 units lunch time, 6 units before bed) + Metformin (2x/day). Checks Finger sticks 2-3x/week, fasting <150 mg/dl. Hgba1c 7.4%, hyperglycemic fingersticks and serum glucose in-house. Of note, daughter reports provided preferred foods from home yesterday (primarily carbohydrate foods, some concentrated sweets) secondary to poor PO intake x 3-4 days. Reviewed menu with daughter, standing orders to be placed to optimize Pt intake of institutional meals prepared in accordance with consistent carbohydrate diet. RD to continue to monitor blood glucose trends as available/able. Pt noted with poor appetite/PO intake per team, <75% of meals consumed since diet advanced. Family amenable to Pt receiving oral nutritional supplement to optimize PO intake: Promote 3x/day. Food preferences obtained; RD to honor as able.     GI: Nausea per team, vomiting episode today 1x (recieving Zofran). Last BM was today 01/01 , no diarrhea/constipation reported. Bowel regimen: none.    -135 pounds x 1 year ago, family endorses weight likely weight gain. Current admission weight of 175 pounds (dosing 12/29) ? accuracy. RD took bed scale weight at time of visit, 138 pounds. Indicates Pt is weight stable at this time. RD to continue to monitor weight trends as available/able.    Hx of T2DM managed with Humalog (15 units lunch time, 6 units before bed) + Metformin (2x/day). Checks Finger sticks 2-3x/week, fasting <150 mg/dl. Hgba1c 7.4%, hyperglycemic fingersticks and serum glucose in-house. Of note, daughter reports provided preferred foods from home yesterday (primarily carbohydrate foods, some concentrated sweets) secondary to poor PO intake x 3-4 days. Reviewed menu with daughter, standing orders to be placed to optimize Pt intake of institutional meals prepared in accordance with consistent carbohydrate diet. RD to continue to monitor blood glucose trends as available/able.

## 2022-01-01 NOTE — PROGRESS NOTE ADULT - ASSESSMENT
72 y/o F w/ hx of DM2, HTN, breast CA (dx in 2/2017) sp b/l  mastectomy and LN resection  jan/2018-hx respiratory arrest 2/2 influenza, complicated by aspiration pna, PEA arrest x2 , CVA w residual Rt leg weakness and rt eye visual loss, PE s/p tPA, ARF requiring HD, s/p trach and PEG tube, 5/18 peg and trach removed  late 2020- 2nd CVA -neurologist- Dr Prajapati , ILR placed aug/21  aw hematochesia- stool mixed w blood and mucus x5 since last night, +abd pain relieved w BM  hx of hemorrhoidectomy/fissurectomy>20yrs ago    #lower GIbleed  Hematochesia- resolved  stable hb, slight drop in h/h today  r/o colitis  CT a/p proctitis   Gi cs appreciated-fu recs- started cipro and flagyl  hold off on invasive procedures for now, family agreeable  advance diet as tolerated  monitor CBC, active t&S  to transfuse for symptoms or hb <8      #CVA hx  sp ILR placed recently  monitor-no events  resume asa, statin    #Cards- cardiac arrest hx  last Echo 2018 reviewed  stable  sees Dr Dunn    #RUSS- i/o, bladder scan  UA, gentle ivf  rpt bmp later today    #HTN  resume home carvedilol  cardura restarted for uncontrolled sbp    #DM-2   dc humulin and metformin-home meds  start lantus and correction scale  a1c      #Breast ca sp b/l mastectomy  resume letrazole    #DVT ppx- venodynes  hold ac dt gi bleed  will start AC given stable hb and no furthur active gi bleed    [facility]  [phone]

## 2022-01-01 NOTE — DIETITIAN INITIAL EVALUATION ADULT. - SIGNS/SYMPTOMS
<75% of meals consumed x 2 days, requires total feeding assistance Pt family verbalizes incomplete information, questions

## 2022-01-01 NOTE — DIETITIAN INITIAL EVALUATION ADULT. - NUTRITIONGOAL OUTCOME2
Pt to teachback 2 nutrition education points upon follow-up. Pt to teach back 2 nutrition education points upon follow-up.

## 2022-01-01 NOTE — DIETITIAN INITIAL EVALUATION ADULT. - PERTINENT LABORATORY DATA
01-01   Hgb 9.6 g/dL<L> Hct 30.7 %<L> HgA1C n/a    Glucose, Serum: 175 mg/dL *H*   24Hr FS:228 mg/dL  390 mg/dL  155 mg/dL  181 mg/dL

## 2022-01-01 NOTE — DIETITIAN INITIAL EVALUATION ADULT. - ETIOLOGY
decreased ability to consume adequate protein-energy in setting of AMS incomplete prior nutrition related education

## 2022-01-01 NOTE — DIETITIAN INITIAL EVALUATION ADULT. - REASON INDICATOR FOR ASSESSMENT
Consult ordered for: Nutrition education secondary to Hgba1c 7.4%   Source: EMR, Pt with AMS (inappropriate for interview), Phone interview with daughter (Leola), communucation with team

## 2022-01-01 NOTE — DIETITIAN INITIAL EVALUATION ADULT. - PHYSCIAL ASSESSMENT
Skin as per nursing flowsheets: no pressure injuries noted 138% IBW  Skin as per nursing flow sheets: no pressure injuries noted

## 2022-01-01 NOTE — DIETITIAN INITIAL EVALUATION ADULT. - PERSON TAUGHT/METHOD
Discussed importance of adequate consumption of meals/supplements to optimize protein-energy intake. Encouraged small/frequent meals, nutrient dense snacks, prioritizing protein foods at meal time. Discussed diet education at length, c emphasis on which foods contain carbohydrates, portion sizes, portion control especially of carbohydrate rich foods, combining protein and carbohydrates at meal and snack times, and need for consistency in carbohydrate intake. Encouraged checking blood glucose levels and checking randomly at times 2 hours after the meal time to determine how different foods affect blood glucose levels. Discussed how to modify previous diet to better control blood glucose levels./verbal instruction/individual instruction/spouse instructed/daughter instructed/teach back - (Patient repeats in own words)

## 2022-01-02 LAB
ANION GAP SERPL CALC-SCNC: 12 MMOL/L — SIGNIFICANT CHANGE UP (ref 5–17)
BUN SERPL-MCNC: 21 MG/DL — SIGNIFICANT CHANGE UP (ref 7–23)
CALCIUM SERPL-MCNC: 8.3 MG/DL — LOW (ref 8.4–10.5)
CHLORIDE SERPL-SCNC: 105 MMOL/L — SIGNIFICANT CHANGE UP (ref 96–108)
CO2 SERPL-SCNC: 21 MMOL/L — LOW (ref 22–31)
CREAT SERPL-MCNC: 1.65 MG/DL — HIGH (ref 0.5–1.3)
GLUCOSE BLDC GLUCOMTR-MCNC: 154 MG/DL — HIGH (ref 70–99)
GLUCOSE BLDC GLUCOMTR-MCNC: 213 MG/DL — HIGH (ref 70–99)
GLUCOSE BLDC GLUCOMTR-MCNC: 217 MG/DL — HIGH (ref 70–99)
GLUCOSE BLDC GLUCOMTR-MCNC: 238 MG/DL — HIGH (ref 70–99)
GLUCOSE SERPL-MCNC: 167 MG/DL — HIGH (ref 70–99)
HCT VFR BLD CALC: 29.1 % — LOW (ref 34.5–45)
HGB BLD-MCNC: 9.1 G/DL — LOW (ref 11.5–15.5)
MCHC RBC-ENTMCNC: 29.4 PG — SIGNIFICANT CHANGE UP (ref 27–34)
MCHC RBC-ENTMCNC: 31.3 GM/DL — LOW (ref 32–36)
MCV RBC AUTO: 93.9 FL — SIGNIFICANT CHANGE UP (ref 80–100)
NRBC # BLD: 0 /100 WBCS — SIGNIFICANT CHANGE UP (ref 0–0)
PLATELET # BLD AUTO: 209 K/UL — SIGNIFICANT CHANGE UP (ref 150–400)
POTASSIUM SERPL-MCNC: 4.3 MMOL/L — SIGNIFICANT CHANGE UP (ref 3.5–5.3)
POTASSIUM SERPL-SCNC: 4.3 MMOL/L — SIGNIFICANT CHANGE UP (ref 3.5–5.3)
RBC # BLD: 3.1 M/UL — LOW (ref 3.8–5.2)
RBC # FLD: 13.9 % — SIGNIFICANT CHANGE UP (ref 10.3–14.5)
SODIUM SERPL-SCNC: 138 MMOL/L — SIGNIFICANT CHANGE UP (ref 135–145)
WBC # BLD: 5.08 K/UL — SIGNIFICANT CHANGE UP (ref 3.8–10.5)
WBC # FLD AUTO: 5.08 K/UL — SIGNIFICANT CHANGE UP (ref 3.8–10.5)

## 2022-01-02 RX ORDER — SODIUM CHLORIDE 9 MG/ML
1000 INJECTION INTRAMUSCULAR; INTRAVENOUS; SUBCUTANEOUS
Refills: 0 | Status: COMPLETED | OUTPATIENT
Start: 2022-01-02 | End: 2022-01-03

## 2022-01-02 RX ORDER — CARVEDILOL PHOSPHATE 80 MG/1
37.5 CAPSULE, EXTENDED RELEASE ORAL EVERY 12 HOURS
Refills: 0 | Status: DISCONTINUED | OUTPATIENT
Start: 2022-01-02 | End: 2022-01-05

## 2022-01-02 RX ORDER — INSULIN LISPRO 100/ML
4 VIAL (ML) SUBCUTANEOUS
Refills: 0 | Status: DISCONTINUED | OUTPATIENT
Start: 2022-01-02 | End: 2022-01-05

## 2022-01-02 RX ADMIN — Medication 250 MILLIGRAM(S): at 17:41

## 2022-01-02 RX ADMIN — HEPARIN SODIUM 5000 UNIT(S): 5000 INJECTION INTRAVENOUS; SUBCUTANEOUS at 06:47

## 2022-01-02 RX ADMIN — Medication 1 TABLET(S): at 11:47

## 2022-01-02 RX ADMIN — CARVEDILOL PHOSPHATE 37.5 MILLIGRAM(S): 80 CAPSULE, EXTENDED RELEASE ORAL at 17:42

## 2022-01-02 RX ADMIN — Medication 2: at 17:38

## 2022-01-02 RX ADMIN — Medication 2: at 09:01

## 2022-01-02 RX ADMIN — Medication 6 MILLIGRAM(S): at 22:40

## 2022-01-02 RX ADMIN — LETROZOLE 2.5 MILLIGRAM(S): 2.5 TABLET, FILM COATED ORAL at 11:46

## 2022-01-02 RX ADMIN — INSULIN GLARGINE 16 UNIT(S): 100 INJECTION, SOLUTION SUBCUTANEOUS at 22:37

## 2022-01-02 RX ADMIN — Medication 1: at 12:35

## 2022-01-02 RX ADMIN — ATORVASTATIN CALCIUM 10 MILLIGRAM(S): 80 TABLET, FILM COATED ORAL at 22:38

## 2022-01-02 RX ADMIN — Medication 4 UNIT(S): at 12:36

## 2022-01-02 RX ADMIN — Medication 500 MILLIGRAM(S): at 06:47

## 2022-01-02 RX ADMIN — Medication 500 MILLIGRAM(S): at 22:37

## 2022-01-02 RX ADMIN — HEPARIN SODIUM 5000 UNIT(S): 5000 INJECTION INTRAVENOUS; SUBCUTANEOUS at 17:43

## 2022-01-02 RX ADMIN — PANTOPRAZOLE SODIUM 40 MILLIGRAM(S): 20 TABLET, DELAYED RELEASE ORAL at 06:47

## 2022-01-02 RX ADMIN — Medication 250 MILLIGRAM(S): at 06:47

## 2022-01-02 RX ADMIN — SODIUM CHLORIDE 80 MILLILITER(S): 9 INJECTION INTRAMUSCULAR; INTRAVENOUS; SUBCUTANEOUS at 20:17

## 2022-01-02 RX ADMIN — CARVEDILOL PHOSPHATE 25 MILLIGRAM(S): 80 CAPSULE, EXTENDED RELEASE ORAL at 04:55

## 2022-01-02 RX ADMIN — Medication 81 MILLIGRAM(S): at 11:46

## 2022-01-02 RX ADMIN — DONEPEZIL HYDROCHLORIDE 10 MILLIGRAM(S): 10 TABLET, FILM COATED ORAL at 22:37

## 2022-01-02 RX ADMIN — Medication 4 UNIT(S): at 17:42

## 2022-01-02 RX ADMIN — Medication 6 MILLIGRAM(S): at 11:46

## 2022-01-02 RX ADMIN — Medication 500 MILLIGRAM(S): at 16:17

## 2022-01-02 NOTE — PROGRESS NOTE ADULT - ASSESSMENT
72 y/o F w/ hx of DM2, HTN, breast CA (dx in 2/2017) sp b/l  mastectomy and LN resection  jan/2018-hx respiratory arrest 2/2 influenza, complicated by aspiration pna, PEA arrest x2 , CVA w residual Rt leg weakness and rt eye visual loss, PE s/p tPA, ARF requiring HD, s/p trach and PEG tube, 5/18 peg and trach removed  late 2020- 2nd CVA -neurologist- Dr Prajapati , ILR placed aug/21  aw hematochesia- stool mixed w blood and mucus x5 since last night, +abd pain relieved w BM  hx of hemorrhoidectomy/fissurectomy>20yrs ago    #lower GIbleed  Hematochesia- resolved  stable hb, slight drop in h/h today  r/o colitis  CT a/p proctitis   Gi cs appreciated-fu recs- started cipro and flagyl  hold off on invasive procedures for now, family agreeable  advance diet as tolerated  monitor CBC, active t&S  to transfuse for symptoms or hb <8      #CVA hx  sp ILR placed recently  monitor-no events  resume asa, statin    #Cards- cardiac arrest hx  last Echo 2018 reviewed  stable  sees Dr Dunn    #RUSS- i/o, bladder scan- no retention  UA, gentle ivf  checkurine lytes  likely ATN/AIN- med induced- change cipro to 250bid  cont flagyl 500tid    #HTN- bp uncontrolled  resume home carvedilol increase to 37.5 bid  cardura restarted for uncontrolled sbp    #DM-2   dc humulin and metformin-home meds  start lantus and correction scale  a1c      #Breast ca sp b/l mastectomy  resume letrazole    #DVT ppx- venodynes  hold ac dt gi bleed  will start AC given stable hb and no furthur active gi bleed    [facility]  [phone]

## 2022-01-02 NOTE — SWALLOW BEDSIDE ASSESSMENT ADULT - SWALLOW EVAL: RECOMMENDED FEEDING/EATING TECHNIQUES
Monitor for s/s aspiration/laryngeal penetration. If noted:  D/C p.o. intake, provide non-oral nutrition/hydration/meds, and contact this service @ [phone]/alternate food with liquid/check mouth frequently for oral residue/pocketing/crush medication (when feasible)/maintain upright posture during/after eating for 30 mins/oral hygiene/position upright (90 degrees)/small sips/bites

## 2022-01-02 NOTE — SWALLOW BEDSIDE ASSESSMENT ADULT - COMMENTS
Dysphagia hx: multiple bedside and FEES exams on previous admissions in 2018. Last recommended diet was 5/15/2018 on bedside exam which was mechanical soft textures/thin liquids, consistent with MBS results (4/16/2018 at ) which found delayed oral prep and delayed pharyngeal trigger on food textures along with penetration and spontaneous retrieval of thin liquids via straw. Patient was +trach, +pmv at that time.    12/29/2021: CT A/P-proctitis  12/31/2021: CXR-Progression of right upper lobe infiltrate. Dysphagia hx: multiple bedside and FEES exams on previous admissions in 2018. Last recommended diet was 5/15/2018 on bedside exam which was mechanical soft textures/thin liquids, consistent with MBS results (4/16/2018 at ) which found delayed oral prep and delayed pharyngeal trigger on food textures along with penetration and spontaneous retrieval of thin liquids via straw. Patient was +trach, +pmv at that time.    12/29/2021: CT A/P-proctitis  12/31/2021: CXR-Progression of right upper lobe infiltrate.  Seen by GI: Proctocolitis on CT, likely infectious vs. stercoral colitis, continue Cipro and Flagyl. Family wishing to hold off on invasive procedures. Cleared for po from GI standpoint. Regular diet pta, prefers vegetarian.

## 2022-01-02 NOTE — SWALLOW BEDSIDE ASSESSMENT ADULT - SLP GENERAL OBSERVATIONS
Pt positioned upright for po trials. Dry cough noted prior to po trials. AA+Ox2. Per RN Aneta, needs assist with feeding but no observed difficulty with meals or medications.

## 2022-01-02 NOTE — SWALLOW BEDSIDE ASSESSMENT ADULT - SWALLOW EVAL: DIAGNOSIS
70 yo admitted with GI bleed; this service consulted due to family noting recent decline and pocketing at times. Patient presents AA+Ox2 on bedside swallow evaluation with grossly functional oropharyngeal swallow. Oral prep, transit and clearance are adequate across textures. There are no overt signs of laryngeal penetration/aspiration. There is no evidence of pocketing. Dry cough is noted prior to po trials and does not appear to be deglutitive. If there is concern for aspiration, MBS may be completed for objective assessment.

## 2022-01-02 NOTE — SWALLOW BEDSIDE ASSESSMENT ADULT - SLP PERTINENT HISTORY OF CURRENT PROBLEM
72 y/o F admitted with GI bleed. Patient w/ hx of DM2, HTN, breast CA (dx in 2/2017) sp b/l  mastectomy and LN resection; jan/2018-hx respiratory arrest 2/2 influenza, complicated by aspiration pna, PEA arrest x2 , CVA w residual Rt leg weakness and rt eye visual loss, PE s/p tPA, ARF requiring HD, s/p trach and PEG tube, 5/18 peg and trach removed; late 2020- 2nd CVA -neurologist- Dr Prajapati , ILR placed aug/21; aw hematochesia- stool mixed w blood and mucus x5 since last night, +abd pain relieved w BM; hx of hemorrhoidectomy/fissurectomy>20yrs ago

## 2022-01-03 ENCOUNTER — TRANSCRIPTION ENCOUNTER (OUTPATIENT)
Age: 72
End: 2022-01-03

## 2022-01-03 LAB
ANION GAP SERPL CALC-SCNC: 11 MMOL/L — SIGNIFICANT CHANGE UP (ref 5–17)
ANION GAP SERPL CALC-SCNC: 9 MMOL/L — SIGNIFICANT CHANGE UP (ref 5–17)
BUN SERPL-MCNC: 23 MG/DL — SIGNIFICANT CHANGE UP (ref 7–23)
BUN SERPL-MCNC: 23 MG/DL — SIGNIFICANT CHANGE UP (ref 7–23)
CALCIUM SERPL-MCNC: 8.3 MG/DL — LOW (ref 8.4–10.5)
CALCIUM SERPL-MCNC: 8.3 MG/DL — LOW (ref 8.4–10.5)
CHLORIDE SERPL-SCNC: 109 MMOL/L — HIGH (ref 96–108)
CHLORIDE SERPL-SCNC: 109 MMOL/L — HIGH (ref 96–108)
CO2 SERPL-SCNC: 20 MMOL/L — LOW (ref 22–31)
CO2 SERPL-SCNC: 20 MMOL/L — LOW (ref 22–31)
CREAT SERPL-MCNC: 1.46 MG/DL — HIGH (ref 0.5–1.3)
CREAT SERPL-MCNC: 1.51 MG/DL — HIGH (ref 0.5–1.3)
GLUCOSE BLDC GLUCOMTR-MCNC: 180 MG/DL — HIGH (ref 70–99)
GLUCOSE BLDC GLUCOMTR-MCNC: 214 MG/DL — HIGH (ref 70–99)
GLUCOSE BLDC GLUCOMTR-MCNC: 272 MG/DL — HIGH (ref 70–99)
GLUCOSE BLDC GLUCOMTR-MCNC: 317 MG/DL — HIGH (ref 70–99)
GLUCOSE SERPL-MCNC: 220 MG/DL — HIGH (ref 70–99)
GLUCOSE SERPL-MCNC: 393 MG/DL — HIGH (ref 70–99)
HCT VFR BLD CALC: 31 % — LOW (ref 34.5–45)
HGB BLD-MCNC: 9.7 G/DL — LOW (ref 11.5–15.5)
MCHC RBC-ENTMCNC: 28.9 PG — SIGNIFICANT CHANGE UP (ref 27–34)
MCHC RBC-ENTMCNC: 31.3 GM/DL — LOW (ref 32–36)
MCV RBC AUTO: 92.3 FL — SIGNIFICANT CHANGE UP (ref 80–100)
NRBC # BLD: 0 /100 WBCS — SIGNIFICANT CHANGE UP (ref 0–0)
PLATELET # BLD AUTO: 214 K/UL — SIGNIFICANT CHANGE UP (ref 150–400)
POTASSIUM SERPL-MCNC: 4.8 MMOL/L — SIGNIFICANT CHANGE UP (ref 3.5–5.3)
POTASSIUM SERPL-MCNC: 4.9 MMOL/L — SIGNIFICANT CHANGE UP (ref 3.5–5.3)
POTASSIUM SERPL-SCNC: 4.8 MMOL/L — SIGNIFICANT CHANGE UP (ref 3.5–5.3)
POTASSIUM SERPL-SCNC: 4.9 MMOL/L — SIGNIFICANT CHANGE UP (ref 3.5–5.3)
RBC # BLD: 3.36 M/UL — LOW (ref 3.8–5.2)
RBC # FLD: 13.5 % — SIGNIFICANT CHANGE UP (ref 10.3–14.5)
SODIUM SERPL-SCNC: 138 MMOL/L — SIGNIFICANT CHANGE UP (ref 135–145)
SODIUM SERPL-SCNC: 140 MMOL/L — SIGNIFICANT CHANGE UP (ref 135–145)
WBC # BLD: 5.82 K/UL — SIGNIFICANT CHANGE UP (ref 3.8–10.5)
WBC # FLD AUTO: 5.82 K/UL — SIGNIFICANT CHANGE UP (ref 3.8–10.5)

## 2022-01-03 RX ORDER — DOXAZOSIN MESYLATE 4 MG
6 TABLET ORAL ONCE
Refills: 0 | Status: COMPLETED | OUTPATIENT
Start: 2022-01-03 | End: 2022-01-03

## 2022-01-03 RX ORDER — METOPROLOL TARTRATE 50 MG
5 TABLET ORAL ONCE
Refills: 0 | Status: COMPLETED | OUTPATIENT
Start: 2022-01-03 | End: 2022-01-03

## 2022-01-03 RX ORDER — METOPROLOL TARTRATE 50 MG
2.5 TABLET ORAL ONCE
Refills: 0 | Status: DISCONTINUED | OUTPATIENT
Start: 2022-01-03 | End: 2022-01-03

## 2022-01-03 RX ORDER — DOXAZOSIN MESYLATE 4 MG
4 TABLET ORAL ONCE
Refills: 0 | Status: COMPLETED | OUTPATIENT
Start: 2022-01-03 | End: 2022-01-03

## 2022-01-03 RX ORDER — DOXAZOSIN MESYLATE 4 MG
1 TABLET ORAL
Qty: 28 | Refills: 0
Start: 2022-01-03 | End: 2022-01-16

## 2022-01-03 RX ORDER — METRONIDAZOLE 500 MG
1 TABLET ORAL
Qty: 21 | Refills: 0
Start: 2022-01-03 | End: 2022-01-09

## 2022-01-03 RX ORDER — DOXAZOSIN MESYLATE 4 MG
8 TABLET ORAL
Refills: 0 | Status: DISCONTINUED | OUTPATIENT
Start: 2022-01-04 | End: 2022-01-05

## 2022-01-03 RX ORDER — CARVEDILOL PHOSPHATE 80 MG/1
1 CAPSULE, EXTENDED RELEASE ORAL
Qty: 0 | Refills: 0 | DISCHARGE

## 2022-01-03 RX ORDER — METOPROLOL TARTRATE 50 MG
2.5 TABLET ORAL ONCE
Refills: 0 | Status: COMPLETED | OUTPATIENT
Start: 2022-01-03 | End: 2022-01-03

## 2022-01-03 RX ORDER — DOXAZOSIN MESYLATE 4 MG
4 TABLET ORAL
Refills: 0 | Status: DISCONTINUED | OUTPATIENT
Start: 2022-01-03 | End: 2022-01-03

## 2022-01-03 RX ORDER — CIPROFLOXACIN LACTATE 400MG/40ML
1 VIAL (ML) INTRAVENOUS
Qty: 14 | Refills: 0
Start: 2022-01-03 | End: 2022-01-09

## 2022-01-03 RX ORDER — DOXAZOSIN MESYLATE 4 MG
8 TABLET ORAL
Refills: 0 | Status: DISCONTINUED | OUTPATIENT
Start: 2022-01-03 | End: 2022-01-03

## 2022-01-03 RX ORDER — CARVEDILOL PHOSPHATE 80 MG/1
3 CAPSULE, EXTENDED RELEASE ORAL
Qty: 84 | Refills: 0
Start: 2022-01-03 | End: 2022-01-16

## 2022-01-03 RX ORDER — DOXAZOSIN MESYLATE 4 MG
3 TABLET ORAL
Qty: 0 | Refills: 0 | DISCHARGE

## 2022-01-03 RX ORDER — FERROUS SULFATE 325(65) MG
0 TABLET ORAL
Qty: 0 | Refills: 0 | DISCHARGE

## 2022-01-03 RX ADMIN — Medication 6 MILLIGRAM(S): at 07:18

## 2022-01-03 RX ADMIN — Medication 500 MILLIGRAM(S): at 21:13

## 2022-01-03 RX ADMIN — Medication 2.5 MILLIGRAM(S): at 23:52

## 2022-01-03 RX ADMIN — DONEPEZIL HYDROCHLORIDE 10 MILLIGRAM(S): 10 TABLET, FILM COATED ORAL at 21:14

## 2022-01-03 RX ADMIN — Medication 6 MILLIGRAM(S): at 05:34

## 2022-01-03 RX ADMIN — LETROZOLE 2.5 MILLIGRAM(S): 2.5 TABLET, FILM COATED ORAL at 12:41

## 2022-01-03 RX ADMIN — SODIUM CHLORIDE 80 MILLILITER(S): 9 INJECTION INTRAMUSCULAR; INTRAVENOUS; SUBCUTANEOUS at 08:37

## 2022-01-03 RX ADMIN — HEPARIN SODIUM 5000 UNIT(S): 5000 INJECTION INTRAVENOUS; SUBCUTANEOUS at 16:56

## 2022-01-03 RX ADMIN — Medication 500 MILLIGRAM(S): at 15:29

## 2022-01-03 RX ADMIN — Medication 1: at 12:34

## 2022-01-03 RX ADMIN — INSULIN GLARGINE 16 UNIT(S): 100 INJECTION, SOLUTION SUBCUTANEOUS at 23:16

## 2022-01-03 RX ADMIN — Medication 4 UNIT(S): at 12:35

## 2022-01-03 RX ADMIN — Medication 500 MILLIGRAM(S): at 05:34

## 2022-01-03 RX ADMIN — Medication 250 MILLIGRAM(S): at 16:48

## 2022-01-03 RX ADMIN — ATORVASTATIN CALCIUM 10 MILLIGRAM(S): 80 TABLET, FILM COATED ORAL at 21:14

## 2022-01-03 RX ADMIN — Medication 250 MILLIGRAM(S): at 05:34

## 2022-01-03 RX ADMIN — CARVEDILOL PHOSPHATE 37.5 MILLIGRAM(S): 80 CAPSULE, EXTENDED RELEASE ORAL at 00:38

## 2022-01-03 RX ADMIN — Medication 5 MILLIGRAM(S): at 18:53

## 2022-01-03 RX ADMIN — Medication 1 TABLET(S): at 12:41

## 2022-01-03 RX ADMIN — Medication 4 UNIT(S): at 08:37

## 2022-01-03 RX ADMIN — CARVEDILOL PHOSPHATE 37.5 MILLIGRAM(S): 80 CAPSULE, EXTENDED RELEASE ORAL at 09:33

## 2022-01-03 RX ADMIN — CARVEDILOL PHOSPHATE 37.5 MILLIGRAM(S): 80 CAPSULE, EXTENDED RELEASE ORAL at 21:25

## 2022-01-03 RX ADMIN — Medication 4 UNIT(S): at 17:27

## 2022-01-03 RX ADMIN — Medication 3: at 08:36

## 2022-01-03 RX ADMIN — Medication 4 MILLIGRAM(S): at 16:48

## 2022-01-03 RX ADMIN — Medication 4: at 17:26

## 2022-01-03 RX ADMIN — HEPARIN SODIUM 5000 UNIT(S): 5000 INJECTION INTRAVENOUS; SUBCUTANEOUS at 05:34

## 2022-01-03 RX ADMIN — PANTOPRAZOLE SODIUM 40 MILLIGRAM(S): 20 TABLET, DELAYED RELEASE ORAL at 05:34

## 2022-01-03 RX ADMIN — Medication 81 MILLIGRAM(S): at 12:41

## 2022-01-03 NOTE — DISCHARGE NOTE PROVIDER - CARE PROVIDER_API CALL
Lester Dunn (DO)  Cardiology; Internal Medicine  [address] [place] [address], [address]  [place] [address]  Phone: [phone]  Fax: [phone]  Established Patient  Follow Up Time:

## 2022-01-03 NOTE — DISCHARGE NOTE NURSING/CASE MANAGEMENT/SOCIAL WORK - NSDCPEFALRISK_GEN_ALL_CORE
For information on Fall & Injury Prevention, visit: [email or web address] OR  [email or web address] OR  https://www.cdc.gov/steadi/patient.html

## 2022-01-03 NOTE — DISCHARGE NOTE PROVIDER - NSDCMRMEDTOKEN_GEN_ALL_CORE_FT
aspirin 81 mg oral tablet, chewable: 1 tab(s) orally once a day  atorvastatin 10 mg oral tablet: 1 tab(s) orally once a day  carvedilol 25 mg oral tablet: 1 tab(s) orally 2 times a day  donepezil 10 mg oral tablet: 1 tab(s) orally once a day (at bedtime)  doxazosin 2 mg oral tablet: 3 tab(s) orally 2 times a day  HumuLIN 70/30 Pen subcutaneous suspension: 15 unit(s) subcutaneous once a day  before lunch  HumuLIN 70/30 Pen subcutaneous suspension: 6 unit(s) subcutaneous once a day (with meals) before dinner  letrozole 2.5 mg oral tablet: 1 tab(s) orally once a day  metFORMIN 750 mg oral tablet, extended release: orally 2 times a day  pantoprazole 40 mg oral delayed release tablet: 1 tab(s) orally once a day   aspirin 81 mg oral tablet, chewable: 1 tab(s) orally once a day  atorvastatin 10 mg oral tablet: 1 tab(s) orally once a day  carvedilol 12.5 mg oral tablet: 3 tab(s) orally every 12 hours  ciprofloxacin 250 mg oral tablet: 1 tab(s) orally 2 times a day  donepezil 10 mg oral tablet: 1 tab(s) orally once a day (at bedtime)  doxazosin 8 mg oral tablet: 1 tab(s) orally 2 times a day   HumuLIN 70/30 Pen subcutaneous suspension: 15 unit(s) subcutaneous once a day  before lunch  HumuLIN 70/30 Pen subcutaneous suspension: 6 unit(s) subcutaneous once a day (with meals) before dinner  letrozole 2.5 mg oral tablet: 1 tab(s) orally once a day  metFORMIN 750 mg oral tablet, extended release: orally 2 times a day  metroNIDAZOLE 500 mg oral tablet: 1 tab(s) orally every 8 hours  Multiple Vitamins oral tablet: 1 tab(s) orally once a day  pantoprazole 40 mg oral delayed release tablet: 1 tab(s) orally once a day

## 2022-01-03 NOTE — CHART NOTE - NSCHARTNOTEFT_GEN_A_CORE
PA Medicine Note   Notified by RN, patient BP: 200/70. Patient seen and examined at bedside, in no acute distress. Patient is asymptomatic and denies weakness, blurry vision, chest pain, palpitations, shortness of breath, and abdominal pain at this time.       Vital Signs Last 24 Hrs  T(C): 36.4 (03 Jan 2022 01:35), Max: 37.2 (02 Jan 2022 12:39)  T(F): 97.6 (03 Jan 2022 01:35), Max: 98.9 (02 Jan 2022 12:39)  HR: 81 (03 Jan 2022 01:35) (66 - 82)  BP: 168/70 (03 Jan 2022 01:35) (151/82 - 205/71)  BP(mean): --  RR: 18 (03 Jan 2022 01:35) (17 - 18)  SpO2: 94% (03 Jan 2022 01:35) (93% - 96%)      Labs:                          9.1    5.08  )-----------( 209      ( 02 Jan 2022 07:26 )             29.1     01-02    138  |  105  |  21  ----------------------------<  167<H>  4.3   |  21<L>  |  1.65<H>    Ca    8.3<L>      02 Jan 2022 07:26      Assessment & Plan:  HPI: 71 year old F w/ PMHX of DM2, HTN, breast CA (dx in 2/2017) sp b/l  mastectomy and LN resection Jan /2018-hx respiratory arrest 2/2 influenza, complicated by aspiration PNA, PEA arrest x2 , CVA w residual Rt leg weakness and rt eye visual loss, PE s/p tPA, ARF requiring HD presenting with hematochezia.   Now presenting with hypertension     #Hypertension  - Patient is asymptomatic   - Patient recently restarted on home dose of Carvedilol 37.5 mg BID and restarted on Cardura  - Pt. received morning dose of Carvedilol- blood pressure now 's  - Will continue to monitor vital signs and reassess hypertension treatment   - Will endorse to AM medicine team, attending to follow     Mel Meyer PA-C  Department of Medicine PA Medicine Note   Notified by RN, patient BP: 200/70. Patient seen and examined at bedside, in no acute distress. Patient is asymptomatic and denies weakness, blurry vision, chest pain, palpitations, shortness of breath, and abdominal pain at this time.       Vital Signs Last 24 Hrs  T(C): 36.4 (03 Jan 2022 01:35), Max: 37.2 (02 Jan 2022 12:39)  T(F): 97.6 (03 Jan 2022 01:35), Max: 98.9 (02 Jan 2022 12:39)  HR: 81 (03 Jan 2022 01:35) (66 - 82)  BP: 168/70 (03 Jan 2022 01:35) (151/82 - 205/71)  BP(mean): --  RR: 18 (03 Jan 2022 01:35) (17 - 18)  SpO2: 94% (03 Jan 2022 01:35) (93% - 96%)      Labs:                          9.1    5.08  )-----------( 209      ( 02 Jan 2022 07:26 )             29.1     01-02    138  |  105  |  21  ----------------------------<  167<H>  4.3   |  21<L>  |  1.65<H>    Ca    8.3<L>      02 Jan 2022 07:26      Assessment & Plan:  HPI: 71 year old F w/ PMHX of DM2, HTN, breast CA (dx in 2/2017) sp b/l  mastectomy and LN resection Jan /2018-hx respiratory arrest 2/2 influenza, complicated by aspiration PNA, PEA arrest x2 , CVA w residual Rt leg weakness and rt eye visual loss, PE s/p tPA, ARF requiring HD presenting with hematochezia.   Now presenting with hypertension     #Hypertension  - Patient is asymptomatic   - Patient recently restarted on home dose of Carvedilol 37.5 mg BID and restarted on Cardura  - Pt. received morning dose of Carvedilol- blood pressure now 's  - Will continue to monitor vital signs and reassess hypertension treatment   - Discussed above plan with - will give an additional dose of Cardura   - Will endorse to AM medicine team    ANNA OkeefeC  Department of Medicine

## 2022-01-03 NOTE — PROGRESS NOTE ADULT - ASSESSMENT
72 y/o F w/ hx of DM2, HTN, breast CA (dx in 2/2017) sp b/l  mastectomy and LN resection  jan/2018-hx respiratory arrest 2/2 influenza, complicated by aspiration pna, PEA arrest x2 , CVA w residual Rt leg weakness and rt eye visual loss, PE s/p tPA, ARF requiring HD, s/p trach and PEG tube, 5/18 peg and trach removed  late 2020- 2nd CVA -neurologist- Dr Prajapati , ILR placed aug/21  aw hematochesia- stool mixed w blood and mucus x5 since last night, +abd pain relieved w BM  hx of hemorrhoidectomy/fissurectomy>20yrs ago    #lower GIbleed  Hematochesia- resolved  stable hb, slight drop in h/h today  r/o colitis  CT a/p proctitis   Gi cs appreciated-fu recs- started cipro and flagyl  hold off on invasive procedures for now, family agreeable  advance diet as tolerated  monitor CBC, active t&S  to transfuse for symptoms or hb <8      #CVA hx  sp ILR placed recently  monitor-no events  resume asa, statin    #Cards- cardiac arrest hx  last Echo 2018 reviewed  stable  sees Dr Dunn    #RUSS- i/o, bladder scan- no retention  UA, gentle ivf  check urine lytes  likely ATN/AIN- med induced-   change cipro to 250bid  cont flagyl 500tid  check BMP later today    #HTN- bp uncontrolled  home carvedilol increased to 37.5 bid  cardura change to 6mg qid if ok w pharmacy    #DM-2   dc humulin and metformin-home meds  started lantus and correction scale  a1c    #Breast ca sp b/l mastectomy  resume letrazole    #DVT ppx-   heparin sq    If cr improving and BP stable- plan to dc home later today  d/w family  [facility]  [phone]

## 2022-01-03 NOTE — DISCHARGE NOTE NURSING/CASE MANAGEMENT/SOCIAL WORK - PATIENT PORTAL LINK FT
You can access the FollowMyHealth Patient Portal offered by [facility] by registering at the following website: [email or web address]. By joining [facility]’s FollowMyHealth portal, you will also be able to view your health information using other applications (apps) compatible with our system.

## 2022-01-03 NOTE — PROGRESS NOTE ADULT - ASSESSMENT
71 year old female with rectal bleeding    1. Rectal bleeding. Pt has some debility from stroke, would not be able to tolerate prep. May be self limited colitis. Pt family wants to hold off on invasive procedures. Rectal exam w brown stool right now.  -Proctocolitis on CT, likely infectious vs. stercoral colitis, continue Cipro and Flagyl   -GI PCR not detected  -normal BM last night; no melena, brbpr  -DC planning     2. CVA    3. Anemia, stools now brown  -hgb stable  -monitor for GI bleeding  -trend CBCs      Attending supervision statement: I have personally seen and examined the patient. I fully participated in the care of this patient. I have made amendments to the documentation where necessary, and agree with the history, physical exam, and plan as outlined by the ACP.      Santos Lux M.D.   Gastroenterology and Hepatology  [address] [place], [place]  Office: [phone]  Cell: [phone] 71 year old female with rectal bleeding    1. Rectal bleeding. Pt has some debility from stroke, would not be able to tolerate prep. May be self limited colitis. Pt family wants to hold off on invasive procedures. Rectal exam w brown stool right now.  -Proctocolitis on CT, likely infectious vs. stercoral colitis, continue Cipro and Flagyl 7 day course outpatient  -GI PCR not detected  -normal BM last night; no melena, brbpr  -DC planning    2. CVA    3. Anemia, stools now brown  -hgb stable  -monitor for GI bleeding  -trend CBCs      Attending supervision statement: I have personally seen and examined the patient. I fully participated in the care of this patient. I have made amendments to the documentation where necessary, and agree with the history, physical exam, and plan as outlined by the ACP.      Santos Lux M.D.   Gastroenterology and Hepatology  [address] [place], [place]  Office: [phone]  Cell: [phone]

## 2022-01-03 NOTE — DISCHARGE NOTE PROVIDER - WILL THE PATIENT ACCEPT THE PFIZER COVID-19 VACCINE IF ELIGIBLE AND IT IS AVAILABLE?
Not applicable
Hx of COPD and still daily smoker. No signs of exacerbation on exam.   --c/w home spiriva and advair  --duonebs q6h

## 2022-01-03 NOTE — PHARMACOTHERAPY INTERVENTION NOTE - COMMENTS
Patient currently on Cardura 6 mg bid    Dosing for hypertension is as follows:    Immediate release: Oral: Initial: 1 mg once daily; titrate gradually as needed based on response and tolerability up to a dose of 16 mg once daily (ACC/AHA [[place] 2018])    D/w NP

## 2022-01-03 NOTE — DISCHARGE NOTE PROVIDER - HOSPITAL COURSE
72 y/o F w/ hx of DM2, HTN, breast CA (dx in 2/2017) sp b/l  mastectomy and LN resection  jan/2018-hx respiratory arrest 2/2 influenza, complicated by aspiration pna, PEA arrest x2 , CVA w residual Rt leg weakness and rt eye visual loss, PE s/p tPA, ARF requiring HD, s/p trach and PEG tube, 5/18 peg and trach removed  late 2020- 2nd CVA -neurologist- Dr Prajapati , ILR placed aug/21  aw hematochesia- stool mixed w blood and mucus x5 since last night, +abd pain relieved w BM  hx of hemorrhoidectomy/fissurectomy>20yrs ago    #Seen by GI:   Rectal bleeding. Pt has some debility from stroke, would not be able to tolerate prep. May be self limited colitis. Pt family wants to hold off on invasive procedures. Rectal exam w brown stool right now.  -Proctocolitis on CT, likely infectious vs. stercoral colitis, continue Cipro and Flagyl   advance diet as tolerated    #CVA hx  sp ILR placed recently  monitor-no events  resume asa, statin    #Cards- cardiac arrest hx  last Echo 2018 reviewed  stable  sees Dr Dunn    #RUSS- i/o, bladder scan- no retention  UA, gentle ivf- improved  likely ATN/AIN- med induced- change cipro to 250bid  cont flagyl 500tid    #HTN- bp uncontrolled  resume home carvedilol increase to 37.5 bid  cardura restarted for uncontrolled sbp    #DM-2   dc humulin and metformin-home meds  start lantus and correction scale  a1c 7.4    pt medically cleared for discharge to home with home-care

## 2022-01-04 VITALS
RESPIRATION RATE: 18 BRPM | OXYGEN SATURATION: 94 % | SYSTOLIC BLOOD PRESSURE: 152 MMHG | DIASTOLIC BLOOD PRESSURE: 79 MMHG | HEART RATE: 90 BPM | TEMPERATURE: 100 F

## 2022-01-04 LAB
ANION GAP SERPL CALC-SCNC: 10 MMOL/L — SIGNIFICANT CHANGE UP (ref 5–17)
BUN SERPL-MCNC: 22 MG/DL — SIGNIFICANT CHANGE UP (ref 7–23)
CALCIUM SERPL-MCNC: 8.7 MG/DL — SIGNIFICANT CHANGE UP (ref 8.4–10.5)
CHLORIDE SERPL-SCNC: 106 MMOL/L — SIGNIFICANT CHANGE UP (ref 96–108)
CO2 SERPL-SCNC: 23 MMOL/L — SIGNIFICANT CHANGE UP (ref 22–31)
CREAT SERPL-MCNC: 1.39 MG/DL — HIGH (ref 0.5–1.3)
GLUCOSE BLDC GLUCOMTR-MCNC: 160 MG/DL — HIGH (ref 70–99)
GLUCOSE BLDC GLUCOMTR-MCNC: 190 MG/DL — HIGH (ref 70–99)
GLUCOSE BLDC GLUCOMTR-MCNC: 195 MG/DL — HIGH (ref 70–99)
GLUCOSE BLDC GLUCOMTR-MCNC: 197 MG/DL — HIGH (ref 70–99)
GLUCOSE BLDC GLUCOMTR-MCNC: 219 MG/DL — HIGH (ref 70–99)
GLUCOSE BLDC GLUCOMTR-MCNC: 225 MG/DL — HIGH (ref 70–99)
GLUCOSE BLDC GLUCOMTR-MCNC: 238 MG/DL — HIGH (ref 70–99)
GLUCOSE BLDC GLUCOMTR-MCNC: 330 MG/DL — HIGH (ref 70–99)
GLUCOSE SERPL-MCNC: 239 MG/DL — HIGH (ref 70–99)
POTASSIUM SERPL-MCNC: 4.5 MMOL/L — SIGNIFICANT CHANGE UP (ref 3.5–5.3)
POTASSIUM SERPL-SCNC: 4.5 MMOL/L — SIGNIFICANT CHANGE UP (ref 3.5–5.3)
SODIUM SERPL-SCNC: 139 MMOL/L — SIGNIFICANT CHANGE UP (ref 135–145)

## 2022-01-04 PROCEDURE — 85610 PROTHROMBIN TIME: CPT

## 2022-01-04 PROCEDURE — 92610 EVALUATE SWALLOWING FUNCTION: CPT

## 2022-01-04 PROCEDURE — 87507 IADNA-DNA/RNA PROBE TQ 12-25: CPT

## 2022-01-04 PROCEDURE — 83036 HEMOGLOBIN GLYCOSYLATED A1C: CPT

## 2022-01-04 PROCEDURE — 74176 CT ABD & PELVIS W/O CONTRAST: CPT

## 2022-01-04 PROCEDURE — 97530 THERAPEUTIC ACTIVITIES: CPT

## 2022-01-04 PROCEDURE — 82962 GLUCOSE BLOOD TEST: CPT

## 2022-01-04 PROCEDURE — 99285 EMERGENCY DEPT VISIT HI MDM: CPT

## 2022-01-04 PROCEDURE — 97161 PT EVAL LOW COMPLEX 20 MIN: CPT

## 2022-01-04 PROCEDURE — 85025 COMPLETE CBC W/AUTO DIFF WBC: CPT

## 2022-01-04 PROCEDURE — 36415 COLL VENOUS BLD VENIPUNCTURE: CPT

## 2022-01-04 PROCEDURE — 71045 X-RAY EXAM CHEST 1 VIEW: CPT

## 2022-01-04 PROCEDURE — 84484 ASSAY OF TROPONIN QUANT: CPT

## 2022-01-04 PROCEDURE — U0003: CPT

## 2022-01-04 PROCEDURE — 86901 BLOOD TYPING SEROLOGIC RH(D): CPT

## 2022-01-04 PROCEDURE — 85730 THROMBOPLASTIN TIME PARTIAL: CPT

## 2022-01-04 PROCEDURE — 83690 ASSAY OF LIPASE: CPT

## 2022-01-04 PROCEDURE — 80048 BASIC METABOLIC PNL TOTAL CA: CPT

## 2022-01-04 PROCEDURE — 86850 RBC ANTIBODY SCREEN: CPT

## 2022-01-04 PROCEDURE — 87086 URINE CULTURE/COLONY COUNT: CPT

## 2022-01-04 PROCEDURE — 97112 NEUROMUSCULAR REEDUCATION: CPT

## 2022-01-04 PROCEDURE — 83735 ASSAY OF MAGNESIUM: CPT

## 2022-01-04 PROCEDURE — 83880 ASSAY OF NATRIURETIC PEPTIDE: CPT

## 2022-01-04 PROCEDURE — 80053 COMPREHEN METABOLIC PANEL: CPT

## 2022-01-04 PROCEDURE — 81001 URINALYSIS AUTO W/SCOPE: CPT

## 2022-01-04 PROCEDURE — 86900 BLOOD TYPING SEROLOGIC ABO: CPT

## 2022-01-04 PROCEDURE — U0005: CPT

## 2022-01-04 PROCEDURE — 0225U NFCT DS DNA&RNA 21 SARSCOV2: CPT

## 2022-01-04 PROCEDURE — 85027 COMPLETE CBC AUTOMATED: CPT

## 2022-01-04 RX ADMIN — Medication 4 UNIT(S): at 09:44

## 2022-01-04 RX ADMIN — Medication 500 MILLIGRAM(S): at 05:10

## 2022-01-04 RX ADMIN — CARVEDILOL PHOSPHATE 37.5 MILLIGRAM(S): 80 CAPSULE, EXTENDED RELEASE ORAL at 17:30

## 2022-01-04 RX ADMIN — LETROZOLE 2.5 MILLIGRAM(S): 2.5 TABLET, FILM COATED ORAL at 11:35

## 2022-01-04 RX ADMIN — Medication 1: at 17:29

## 2022-01-04 RX ADMIN — Medication 1 TABLET(S): at 11:36

## 2022-01-04 RX ADMIN — Medication 250 MILLIGRAM(S): at 17:26

## 2022-01-04 RX ADMIN — Medication 4: at 12:40

## 2022-01-04 RX ADMIN — Medication 4 UNIT(S): at 12:41

## 2022-01-04 RX ADMIN — Medication 8 MILLIGRAM(S): at 10:37

## 2022-01-04 RX ADMIN — Medication 81 MILLIGRAM(S): at 11:36

## 2022-01-04 RX ADMIN — Medication 2: at 09:44

## 2022-01-04 RX ADMIN — Medication 8 MILLIGRAM(S): at 22:20

## 2022-01-04 RX ADMIN — CARVEDILOL PHOSPHATE 37.5 MILLIGRAM(S): 80 CAPSULE, EXTENDED RELEASE ORAL at 05:10

## 2022-01-04 RX ADMIN — Medication 500 MILLIGRAM(S): at 13:45

## 2022-01-04 RX ADMIN — ATORVASTATIN CALCIUM 10 MILLIGRAM(S): 80 TABLET, FILM COATED ORAL at 22:21

## 2022-01-04 RX ADMIN — Medication 500 MILLIGRAM(S): at 22:22

## 2022-01-04 RX ADMIN — HEPARIN SODIUM 5000 UNIT(S): 5000 INJECTION INTRAVENOUS; SUBCUTANEOUS at 17:27

## 2022-01-04 RX ADMIN — DONEPEZIL HYDROCHLORIDE 10 MILLIGRAM(S): 10 TABLET, FILM COATED ORAL at 22:21

## 2022-01-04 RX ADMIN — PANTOPRAZOLE SODIUM 40 MILLIGRAM(S): 20 TABLET, DELAYED RELEASE ORAL at 05:10

## 2022-01-04 RX ADMIN — Medication 250 MILLIGRAM(S): at 05:10

## 2022-01-04 RX ADMIN — INSULIN GLARGINE 16 UNIT(S): 100 INJECTION, SOLUTION SUBCUTANEOUS at 22:20

## 2022-01-04 RX ADMIN — HEPARIN SODIUM 5000 UNIT(S): 5000 INJECTION INTRAVENOUS; SUBCUTANEOUS at 05:09

## 2022-01-04 RX ADMIN — Medication 4 UNIT(S): at 17:30

## 2022-01-04 NOTE — PROVIDER CONTACT NOTE (OTHER) - SITUATION
Pt admit with GI Hemorrhage; Pt manual /80; pt asymptomatic
Pt hypertensive
pt hypertensive: /70 electronically; 205/71 manually
Patient with elevated BP.
pt had clear sticky emsis, pt unable to tolerate PO intake, emesis after initiation of feed as well.

## 2022-01-04 NOTE — PROVIDER CONTACT NOTE (OTHER) - RECOMMENDATIONS
hold pt for observation? speech and swallow eval?
IV BP medication?
To administer Coreg early.
notify NP

## 2022-01-04 NOTE — PROVIDER CONTACT NOTE (OTHER) - BACKGROUND
Pt admit with GI Hemorrhage; hx DM, HTN, breast CA
admitted for GI bleed.
admitted for GI bleed, now resolved
pt admitted for GI hemorrhage
Patient is admitted for GI bleed. Patient has a history of stroke, HTN, breast CA, and dm.

## 2022-01-04 NOTE — PROVIDER CONTACT NOTE (OTHER) - REASON
Patient with elevated BP.
pt had clear sticky emsis, pt unable to tolerate PO intake, emesis after initiation of feed as well.
Pt hypertensive
Pt manual /80; pt asymptomatic
pt hypertensive: /70 electronically; 205/71 manually

## 2022-01-04 NOTE — PROVIDER CONTACT NOTE (OTHER) - ASSESSMENT
Pt resting in bed. + clear sticky, mucous like emesis noted on pt chest prior to meal. Pt attempted to eat w/ assistance, pt started to have productive cough, as per , pt has chronic cough and will vomit mucous at times. pt began to vomit PO intake, emesis appears sticky w/ food particles. NP Bambi Villalpando made aware.
Patient is A&Ox to self and place. Patient denies any chest pain, HA or SOB. Other vital signs as charted.
pt is A+Ox2/3. asymptomatic at this time. denies headache, visual disturbances, chest pain.     BP electronically: 203/70. manually 205/71. HR: 82.    restarted on cardura and carvedilol doses increased to home dose today.
Pt manual /80; pt asymptomatic
Pt resting in bed, no s/s of discomfort or pain present,  on Facetime at bedside to speak w/ pt and staff. Vdging w/o difficulty, +BM this AM. Pt tolerating PO intake, assisted w/ meals, FS done as per order. /84, HR 75. NP Bambi Gonzalez made aware.

## 2022-01-04 NOTE — PROGRESS NOTE ADULT - SUBJECTIVE AND OBJECTIVE BOX
Chief Complaint:  Patient is a 71y old  Female who presents with a chief complaint of bleeding per rectum (03 Jan 2022 13:15)      Date of service 01-04-22 @ 13:14      Interval Events:   Patient seen and examined. No acute overnight events.     Hospital Medications:  aspirin  chewable 81 milliGRAM(s) Oral daily  atorvastatin 10 milliGRAM(s) Oral at bedtime  carvedilol 37.5 milliGRAM(s) Oral every 12 hours  ciprofloxacin     Tablet 250 milliGRAM(s) Oral two times a day  dextrose 40% Gel 15 Gram(s) Oral once  dextrose 5%. 1000 milliLiter(s) IV Continuous <Continuous>  dextrose 5%. 1000 milliLiter(s) IV Continuous <Continuous>  dextrose 50% Injectable 25 Gram(s) IV Push once  dextrose 50% Injectable 12.5 Gram(s) IV Push once  dextrose 50% Injectable 25 Gram(s) IV Push once  donepezil 10 milliGRAM(s) Oral at bedtime  doxazosin 8 milliGRAM(s) Oral <User Schedule>  glucagon  Injectable 1 milliGRAM(s) IntraMuscular once  heparin   Injectable 5000 Unit(s) SubCutaneous every 12 hours  insulin glargine Injectable (LANTUS) 16 Unit(s) SubCutaneous at bedtime  insulin lispro (ADMELOG) corrective regimen sliding scale   SubCutaneous three times a day before meals  insulin lispro (ADMELOG) corrective regimen sliding scale   SubCutaneous at bedtime  insulin lispro Injectable (ADMELOG) 4 Unit(s) SubCutaneous three times a day before meals  letrozole 2.5 milliGRAM(s) Oral daily  metroNIDAZOLE    Tablet 500 milliGRAM(s) Oral every 8 hours  multivitamin 1 Tablet(s) Oral daily  ondansetron Injectable 4 milliGRAM(s) IV Push every 8 hours PRN  pantoprazole    Tablet 40 milliGRAM(s) Oral before breakfast        Review of Systems:  General:  No wt loss, fevers, chills, night sweats, fatigue,   Eyes:  Good vision, no reported pain  ENT:  No sore throat, pain, runny nose, dysphagia  CV:  No pain, palpitations, hypo/hypertension  Resp:  No dyspnea, cough, tachypnea, wheezing  GI:  See HPI  :  No pain, bleeding, incontinence, nocturia  Muscle:  No pain, weakness  Neuro:  No weakness, tingling, memory problems  Psych:  No fatigue, insomnia, mood problems, depression  Endocrine:  No polyuria, polydipsia, cold/heat intolerance  Heme:  No petechiae, ecchymosis, easy bruisability  Integumentary:  No rash, edema    PHYSICAL EXAM:   Vital Signs:  Vital Signs Last 24 Hrs  T(C): 36.9 (04 Jan 2022 09:07), Max: 37.3 (03 Jan 2022 14:06)  T(F): 98.5 (04 Jan 2022 09:07), Max: 99.1 (03 Jan 2022 14:06)  HR: 89 (04 Jan 2022 11:35) (61 - 89)  BP: 163/75 (04 Jan 2022 11:35) (130/87 - 207/96)  BP(mean): 96 (04 Jan 2022 06:16) (96 - 108)  RR: 18 (04 Jan 2022 09:07) (18 - 18)  SpO2: 97% (04 Jan 2022 09:07) (94% - 98%)  Daily     Daily       PHYSICAL EXAM:     GENERAL:  Appears stated age, well-groomed, well-nourished, no distress  HEENT:  NC/AT,  conjunctivae anicteric, clear and pink,   NECK: supple, trachea midline  CHEST:  Full & symmetric excursion, no increased effort, breath sounds clear  HEART:  Regular rhythm, no JVD  ABDOMEN:  Soft, non-tender, non-distended, normoactive bowel sounds,  no masses , no hepatosplenomegaly  EXTREMITIES:  no cyanosis,clubbing or edema  SKIN:  No rash, erythema, or, ecchymoses, no jaundice  NEURO:  Alert, non-focal, no asterixis  PSYCH: Appropriate affect, oriented to place and time  RECTAL: Deferred      LABS Personally reviewed by me:                        9.7    5.82  )-----------( 214      ( 03 Jan 2022 06:22 )             31.0       01-04    139  |  106  |  22  ----------------------------<  239<H>  4.5   |  23  |  1.39<H>    Ca    8.7      04 Jan 2022 08:24                                    9.7    5.82  )-----------( 214      ( 03 Jan 2022 06:22 )             31.0                         9.1    5.08  )-----------( 209      ( 02 Jan 2022 07:26 )             29.1       Imaging personally reviewed by me:          
Patient is a 71y old  Female who presents with a chief complaint of bleeding per rectum (01 Jan 2022 10:10)      INTERVAL HPI/OVERNIGHT EVENTS: noted  pt seen and examined this am   events noted  feels well, tolerating po  no furthur abd pain/hematochesis  SBP elevated      Vital Signs Last 24 Hrs  T(C): 36.8 (01 Jan 2022 16:12), Max: 36.9 (31 Dec 2021 22:20)  T(F): 98.2 (01 Jan 2022 16:12), Max: 98.4 (31 Dec 2021 22:20)  HR: 75 (01 Jan 2022 16:12) (72 - 83)  BP: 155/75 (01 Jan 2022 16:12) (145/79 - 187/76)  BP(mean): --  RR: 18 (01 Jan 2022 16:12) (18 - 18)  SpO2: 96% (01 Jan 2022 16:12) (93% - 99%)    aspirin  chewable 81 milliGRAM(s) Oral daily  atorvastatin 10 milliGRAM(s) Oral at bedtime  carvedilol 25 milliGRAM(s) Oral every 12 hours  ciprofloxacin   IVPB      ciprofloxacin   IVPB 400 milliGRAM(s) IV Intermittent every 12 hours  dextrose 40% Gel 15 Gram(s) Oral once  dextrose 5%. 1000 milliLiter(s) IV Continuous <Continuous>  dextrose 5%. 1000 milliLiter(s) IV Continuous <Continuous>  dextrose 50% Injectable 25 Gram(s) IV Push once  dextrose 50% Injectable 12.5 Gram(s) IV Push once  dextrose 50% Injectable 25 Gram(s) IV Push once  donepezil 10 milliGRAM(s) Oral at bedtime  doxazosin 6 milliGRAM(s) Oral <User Schedule>  glucagon  Injectable 1 milliGRAM(s) IntraMuscular once  heparin   Injectable 5000 Unit(s) SubCutaneous every 12 hours  insulin glargine Injectable (LANTUS) 16 Unit(s) SubCutaneous at bedtime  insulin lispro (ADMELOG) corrective regimen sliding scale   SubCutaneous three times a day before meals  insulin lispro (ADMELOG) corrective regimen sliding scale   SubCutaneous at bedtime  letrozole 2.5 milliGRAM(s) Oral daily  metroNIDAZOLE    Tablet      metroNIDAZOLE    Tablet 500 milliGRAM(s) Oral every 8 hours  multivitamin 1 Tablet(s) Oral daily  ondansetron Injectable 4 milliGRAM(s) IV Push every 8 hours PRN  pantoprazole    Tablet 40 milliGRAM(s) Oral before breakfast  sodium chloride 0.9%. 1000 milliLiter(s) IV Continuous <Continuous>      PHYSICAL EXAM:  GENERAL: NAD,   EYES: conjunctiva and sclera clear  ENMT: Moist mucous membranes  NECK: Supple, No JVD, Normal thyroid  CHEST/LUNG: non labored, cta b/l  HEART: Regular rate and rhythm; No murmurs, rubs, or gallops  ABDOMEN: Soft, Nontender, Nondistended; Bowel sounds present  EXTREMITIES:  2+ Peripheral Pulses, No clubbing, cyanosis, or edema  LYMPH: No lymphadenopathy noted  SKIN: No rashes or lesions    Consultant(s) Notes Reviewed:  [x ] YES  [ ] NO  Care Discussed with Consultants/Other Providers [ x] YES  [ ] NO    LABS:                        9.6    4.91  )-----------( 234      ( 01 Jan 2022 07:16 )             30.7     01-01    136  |  103  |  22  ----------------------------<  175<H>  4.7   |  20<L>  |  1.63<H>    Ca    9.3      01 Jan 2022 07:14          CAPILLARY BLOOD GLUCOSE      POCT Blood Glucose.: 314 mg/dL (01 Jan 2022 17:00)  POCT Blood Glucose.: 207 mg/dL (01 Jan 2022 11:54)  POCT Blood Glucose.: 228 mg/dL (01 Jan 2022 08:12)  POCT Blood Glucose.: 390 mg/dL (31 Dec 2021 21:55)            GI PCR Panel, Stool (collected 30 Dec 2021 23:05)  Source: .Stool Feces  Final Report (31 Dec 2021 10:49):    GI PCR Results: NOT detected    *******Please Note:*******    GI panel PCR evaluates for:    Campylobacter, Plesiomonas shigelloides, Salmonella,    Vibrio, Yersinia enterocolitica, Enteroaggregative    Escherichia coli (EAEC), Enteropathogenic E.coli (EPEC),    Enterotoxigenic E. coli (ETEC) lt/st, Shiga-like    toxin-producing E. coli (STEC) stx1/stx2,    Shigella/ Enteroinvasive E. coli (EIEC), Cryptosporidium,    Cyclospora cayetanensis, Entamoeba histolytica,    Giardia lamblia, Adenovirus F 40/41, Astrovirus,    Norovirus GI/GII, Rotavirus A, Sapovirus        RADIOLOGY & ADDITIONAL TESTS:    Imaging Personally Reviewed:  [x ] YES  [ ] NO
  Chief Complaint:  Patient is a 71y old  Female who presents with a chief complaint of bleeding per rectum (2022 10:30)      Date of service 22 @ 11:56      Interval Events:   Patient seen and examined. Patient more alert and smiling.     Hospital Medications:  aspirin  chewable 81 milliGRAM(s) Oral daily  atorvastatin 10 milliGRAM(s) Oral at bedtime  carvedilol 37.5 milliGRAM(s) Oral every 12 hours  ciprofloxacin     Tablet 250 milliGRAM(s) Oral two times a day  dextrose 40% Gel 15 Gram(s) Oral once  dextrose 5%. 1000 milliLiter(s) IV Continuous <Continuous>  dextrose 5%. 1000 milliLiter(s) IV Continuous <Continuous>  dextrose 50% Injectable 25 Gram(s) IV Push once  dextrose 50% Injectable 12.5 Gram(s) IV Push once  dextrose 50% Injectable 25 Gram(s) IV Push once  donepezil 10 milliGRAM(s) Oral at bedtime  doxazosin 6 milliGRAM(s) Oral <User Schedule>  glucagon  Injectable 1 milliGRAM(s) IntraMuscular once  heparin   Injectable 5000 Unit(s) SubCutaneous every 12 hours  insulin glargine Injectable (LANTUS) 16 Unit(s) SubCutaneous at bedtime  insulin lispro (ADMELOG) corrective regimen sliding scale   SubCutaneous three times a day before meals  insulin lispro (ADMELOG) corrective regimen sliding scale   SubCutaneous at bedtime  insulin lispro Injectable (ADMELOG) 4 Unit(s) SubCutaneous three times a day before meals  letrozole 2.5 milliGRAM(s) Oral daily  metroNIDAZOLE    Tablet 500 milliGRAM(s) Oral every 8 hours  multivitamin 1 Tablet(s) Oral daily  ondansetron Injectable 4 milliGRAM(s) IV Push every 8 hours PRN  pantoprazole    Tablet 40 milliGRAM(s) Oral before breakfast        Review of Systems:  General:  No wt loss, fevers, chills, night sweats, fatigue,   Eyes:  Good vision, no reported pain  ENT:  No sore throat, pain, runny nose, dysphagia  CV:  No pain, palpitations, hypo/hypertension  Resp:  No dyspnea, cough, tachypnea, wheezing  GI:  See HPI  :  No pain, bleeding, incontinence, nocturia  Muscle:  No pain, weakness  Neuro:  No weakness, tingling, memory problems  Psych:  No fatigue, insomnia, mood problems, depression  Endocrine:  No polyuria, polydipsia, cold/heat intolerance  Heme:  No petechiae, ecchymosis, easy bruisability  Integumentary:  No rash, edema    PHYSICAL EXAM:   Vital Signs:  Vital Signs Last 24 Hrs  T(C): 37.1 (2022 08:59), Max: 37.2 (2022 12:39)  T(F): 98.7 (2022 08:59), Max: 98.9 (2022 12:39)  HR: 76 (2022 08:59) (66 - 82)  BP: 175/69 (2022 09:21) (151/82 - 208/78)  BP(mean): --  RR: 20 (2022 08:59) (17 - 20)  SpO2: 97% (2022 08:59) (93% - 97%)  Daily     Daily       PHYSICAL EXAM:     GENERAL:  Appears stated age, well-groomed, well-nourished, no distress  HEENT:  NC/AT,  conjunctivae anicteric, clear and pink,   NECK: supple, trachea midline  CHEST:  Full & symmetric excursion, no increased effort, breath sounds clear  HEART:  Regular rhythm, no JVD  ABDOMEN:  Soft, non-tender, non-distended, normoactive bowel sounds,  no masses , no hepatosplenomegaly  EXTREMITIES:  no cyanosis,clubbing or edema  SKIN:  No rash, erythema, or, ecchymoses, no jaundice  NEURO:  Alert, non-focal, no asterixis  PSYCH: Appropriate affect, oriented to place and time  RECTAL: Deferred      LABS Personally reviewed by me:                        9.7    5.82  )-----------( 214      ( 2022 06:22 )             31.0     Mean Cell Volume: 92.3 fl (22 @ 06:22)        140  |  109<H>  |  23  ----------------------------<  220<H>  4.8   |  20<L>  |  1.51<H>    Ca    8.3<L>      2022 06:22          Urinalysis Basic - ( 2022 19:11 )    Color: Yellow / Appearance: Clear / S.017 / pH: x  Gluc: x / Ketone: Negative  / Bili: Negative / Urobili: Negative   Blood: x / Protein: 300 mg/dL / Nitrite: Negative   Leuk Esterase: Negative / RBC: 3 /hpf / WBC 2 /HPF   Sq Epi: x / Non Sq Epi: 2 /hpf / Bacteria: Negative                              9.7    5.82  )-----------( 214      ( 2022 06:22 )             31.0                         9.1    5.08  )-----------( 209      ( 2022 07:26 )             29.1                         9.6    4.91  )-----------( 234      ( 2022 07:16 )             30.7       Imaging personally reviewed by me:          
Patient is a 71y old  Female who presents with a chief complaint of bleeding per rectum (04 Jan 2022 11:00)      INTERVAL HPI/OVERNIGHT EVENTS: noted  pt seen and examined this am   events noted  feels well      Vital Signs Last 24 Hrs  T(C): 37.7 (04 Jan 2022 20:50), Max: 37.7 (04 Jan 2022 20:50)  T(F): 99.8 (04 Jan 2022 20:50), Max: 99.8 (04 Jan 2022 20:50)  HR: 90 (04 Jan 2022 20:50) (61 - 90)  BP: 152/79 (04 Jan 2022 20:50) (130/87 - 207/96)  BP(mean): 96 (04 Jan 2022 06:16) (96 - 108)  RR: 18 (04 Jan 2022 20:50) (18 - 18)  SpO2: 94% (04 Jan 2022 20:50) (94% - 99%)    aspirin  chewable 81 milliGRAM(s) Oral daily  atorvastatin 10 milliGRAM(s) Oral at bedtime  carvedilol 37.5 milliGRAM(s) Oral every 12 hours  ciprofloxacin     Tablet 250 milliGRAM(s) Oral two times a day  dextrose 40% Gel 15 Gram(s) Oral once  dextrose 5%. 1000 milliLiter(s) IV Continuous <Continuous>  dextrose 5%. 1000 milliLiter(s) IV Continuous <Continuous>  dextrose 50% Injectable 25 Gram(s) IV Push once  dextrose 50% Injectable 12.5 Gram(s) IV Push once  dextrose 50% Injectable 25 Gram(s) IV Push once  donepezil 10 milliGRAM(s) Oral at bedtime  doxazosin 8 milliGRAM(s) Oral <User Schedule>  glucagon  Injectable 1 milliGRAM(s) IntraMuscular once  heparin   Injectable 5000 Unit(s) SubCutaneous every 12 hours  insulin glargine Injectable (LANTUS) 16 Unit(s) SubCutaneous at bedtime  insulin lispro (ADMELOG) corrective regimen sliding scale   SubCutaneous three times a day before meals  insulin lispro (ADMELOG) corrective regimen sliding scale   SubCutaneous at bedtime  insulin lispro Injectable (ADMELOG) 4 Unit(s) SubCutaneous three times a day before meals  letrozole 2.5 milliGRAM(s) Oral daily  metroNIDAZOLE    Tablet 500 milliGRAM(s) Oral every 8 hours  multivitamin 1 Tablet(s) Oral daily  ondansetron Injectable 4 milliGRAM(s) IV Push every 8 hours PRN  pantoprazole    Tablet 40 milliGRAM(s) Oral before breakfast      PHYSICAL EXAM:  GENERAL: NAD,   EYES: conjunctiva and sclera clear  ENMT: Moist mucous membranes  NECK: Supple, No JVD, Normal thyroid  CHEST/LUNG: non labored, cta b/l  HEART: Regular rate and rhythm; No murmurs, rubs, or gallops  ABDOMEN: Soft, Nontender, Nondistended; Bowel sounds present  EXTREMITIES:  2+ Peripheral Pulses, No clubbing, cyanosis, or edema  LYMPH: No lymphadenopathy noted  SKIN: No rashes or lesions    Consultant(s) Notes Reviewed:  [x ] YES  [ ] NO  Care Discussed with Consultants/Other Providers [ x] YES  [ ] NO    LABS:                        9.7    5.82  )-----------( 214      ( 03 Jan 2022 06:22 )             31.0     01-04    139  |  106  |  22  ----------------------------<  239<H>  4.5   |  23  |  1.39<H>    Ca    8.7      04 Jan 2022 08:24          CAPILLARY BLOOD GLUCOSE      POCT Blood Glucose.: 195 mg/dL (04 Jan 2022 17:09)  POCT Blood Glucose.: 330 mg/dL (04 Jan 2022 12:04)  POCT Blood Glucose.: 219 mg/dL (04 Jan 2022 09:25)  POCT Blood Glucose.: 238 mg/dL (04 Jan 2022 09:06)  POCT Blood Glucose.: 225 mg/dL (04 Jan 2022 08:46)  POCT Blood Glucose.: 214 mg/dL (03 Jan 2022 23:08)  POCT Blood Glucose.: 197 mg/dL (03 Jan 2022 22:50)              RADIOLOGY & ADDITIONAL TESTS:    Imaging Personally Reviewed:  [x ] YES  [ ] NO
Patient is a 71y old  Female who presents with a chief complaint of bleeding per rectum (31 Dec 2021 11:31)      INTERVAL HPI/OVERNIGHT EVENTS: noted  pt seen and examined this am   events noted  feels well  tolerating clears well  no furthur bloody bm      Vital Signs Last 24 Hrs  T(C): 36.6 (31 Dec 2021 08:22), Max: 37.3 (31 Dec 2021 00:34)  T(F): 97.9 (31 Dec 2021 08:22), Max: 99.1 (31 Dec 2021 00:34)  HR: 72 (31 Dec 2021 08:22) (64 - 77)  BP: 190/80 (31 Dec 2021 11:05) (144/68 - 190/80)  BP(mean): --  RR: 18 (31 Dec 2021 08:22) (18 - 20)  SpO2: 93% (31 Dec 2021 08:22) (93% - 97%)    aspirin  chewable 81 milliGRAM(s) Oral daily  atorvastatin 10 milliGRAM(s) Oral at bedtime  carvedilol 25 milliGRAM(s) Oral every 12 hours  ciprofloxacin   IVPB      ciprofloxacin   IVPB 400 milliGRAM(s) IV Intermittent every 12 hours  dextrose 40% Gel 15 Gram(s) Oral once  dextrose 5%. 1000 milliLiter(s) IV Continuous <Continuous>  dextrose 5%. 1000 milliLiter(s) IV Continuous <Continuous>  dextrose 50% Injectable 25 Gram(s) IV Push once  dextrose 50% Injectable 12.5 Gram(s) IV Push once  dextrose 50% Injectable 25 Gram(s) IV Push once  donepezil 10 milliGRAM(s) Oral at bedtime  doxazosin 6 milliGRAM(s) Oral <User Schedule>  glucagon  Injectable 1 milliGRAM(s) IntraMuscular once  heparin   Injectable 5000 Unit(s) SubCutaneous every 12 hours  insulin glargine Injectable (LANTUS) 6 Unit(s) SubCutaneous at bedtime  insulin lispro (ADMELOG) corrective regimen sliding scale   SubCutaneous three times a day before meals  insulin lispro (ADMELOG) corrective regimen sliding scale   SubCutaneous at bedtime  letrozole 2.5 milliGRAM(s) Oral daily  metroNIDAZOLE    Tablet      metroNIDAZOLE    Tablet 500 milliGRAM(s) Oral every 8 hours  pantoprazole    Tablet 40 milliGRAM(s) Oral before breakfast      PHYSICAL EXAM:  GENERAL: NAD,   EYES: conjunctiva and sclera clear  ENMT: Moist mucous membranes  NECK: Supple, No JVD, Normal thyroid  CHEST/LUNG: non labored, cta b/l  HEART: Regular rate and rhythm; No murmurs, rubs, or gallops  ABDOMEN: Soft, Nontender, Nondistended; Bowel sounds present  EXTREMITIES:  2+ Peripheral Pulses, No clubbing, cyanosis, or edema  LYMPH: No lymphadenopathy noted  SKIN: No rashes or lesions    Consultant(s) Notes Reviewed:  [x ] YES  [ ] NO  Care Discussed with Consultants/Other Providers [ x] YES  [ ] NO    LABS:                        10.5   6.64  )-----------( 194      ( 31 Dec 2021 07:25 )             33.5           PT/INR - ( 29 Dec 2021 12:50 )   PT: 11.9 sec;   INR: 0.99 ratio         PTT - ( 29 Dec 2021 12:50 )  PTT:28.4 sec    CAPILLARY BLOOD GLUCOSE      POCT Blood Glucose.: 181 mg/dL (31 Dec 2021 12:19)  POCT Blood Glucose.: 227 mg/dL (31 Dec 2021 08:22)  POCT Blood Glucose.: 319 mg/dL (30 Dec 2021 21:40)  POCT Blood Glucose.: 106 mg/dL (30 Dec 2021 17:01)            GI PCR Panel, Stool (collected 30 Dec 2021 23:05)  Source: .Stool Feces  Final Report (31 Dec 2021 10:49):    GI PCR Results: NOT detected    *******Please Note:*******    GI panel PCR evaluates for:    Campylobacter, Plesiomonas shigelloides, Salmonella,    Vibrio, Yersinia enterocolitica, Enteroaggregative    Escherichia coli (EAEC), Enteropathogenic E.coli (EPEC),    Enterotoxigenic E. coli (ETEC) lt/st, Shiga-like    toxin-producing E. coli (STEC) stx1/stx2,    Shigella/ Enteroinvasive E. coli (EIEC), Cryptosporidium,    Cyclospora cayetanensis, Entamoeba histolytica,    Giardia lamblia, Adenovirus F 40/41, Astrovirus,    Norovirus GI/GII, Rotavirus A, Sapovirus    Culture - Urine (collected 29 Dec 2021 12:37)  Source: Clean Catch Clean Catch (Midstream)  Final Report (30 Dec 2021 11:35):    <10,000 CFU/mL Normal Urogenital Cate        RADIOLOGY & ADDITIONAL TESTS:    Imaging Personally Reviewed:  [x ] YES  [ ] NO
  Chief Complaint:  Patient is a 71y old  Female who presents with a chief complaint of bleeding per rectum (29 Dec 2021 17:01)      Date of service 21 @ 12:12      Interval Events:   Patient seen and examined. Patient had dark stool yesterday. This morning had normal brown BM. Tolerating clear liquid diet.      Hospital Medications:  aspirin  chewable 81 milliGRAM(s) Oral daily  atorvastatin 10 milliGRAM(s) Oral at bedtime  carvedilol 25 milliGRAM(s) Oral every 12 hours  ciprofloxacin   IVPB      dextrose 40% Gel 15 Gram(s) Oral once  dextrose 5%. 1000 milliLiter(s) IV Continuous <Continuous>  dextrose 5%. 1000 milliLiter(s) IV Continuous <Continuous>  dextrose 50% Injectable 25 Gram(s) IV Push once  dextrose 50% Injectable 12.5 Gram(s) IV Push once  dextrose 50% Injectable 25 Gram(s) IV Push once  donepezil 10 milliGRAM(s) Oral at bedtime  glucagon  Injectable 1 milliGRAM(s) IntraMuscular once  insulin glargine Injectable (LANTUS) 6 Unit(s) SubCutaneous at bedtime  insulin lispro (ADMELOG) corrective regimen sliding scale   SubCutaneous three times a day before meals  insulin lispro (ADMELOG) corrective regimen sliding scale   SubCutaneous at bedtime  letrozole 2.5 milliGRAM(s) Oral daily  metroNIDAZOLE  IVPB      pantoprazole    Tablet 40 milliGRAM(s) Oral before breakfast        Review of Systems:  General:  No wt loss, fevers, chills, night sweats, fatigue,   Eyes:  Good vision, no reported pain  ENT:  No sore throat, pain, runny nose, dysphagia  CV:  No pain, palpitations, hypo/hypertension  Resp:  No dyspnea, cough, tachypnea, wheezing  GI:  See HPI  :  No pain, bleeding, incontinence, nocturia  Muscle:  No pain, weakness  Neuro:  No weakness, tingling, memory problems  Psych:  No fatigue, insomnia, mood problems, depression  Endocrine:  No polyuria, polydipsia, cold/heat intolerance  Heme:  No petechiae, ecchymosis, easy bruisability  Integumentary:  No rash, edema    PHYSICAL EXAM:   Vital Signs:  Vital Signs Last 24 Hrs  T(C): 36.8 (30 Dec 2021 05:08), Max: 36.9 (30 Dec 2021 00:53)  T(F): 98.3 (30 Dec 2021 05:08), Max: 98.4 (30 Dec 2021 00:53)  HR: 76 (30 Dec 2021 05:08) (74 - 102)  BP: 176/82 (30 Dec 2021 05:08) (140/82 - 194/69)  BP(mean): --  RR: 18 (30 Dec 2021 05:08) (18 - 19)  SpO2: 98% (30 Dec 2021 05:08) (93% - 98%)  Daily     Daily       PHYSICAL EXAM:     GENERAL:  Appears stated age, well-groomed, well-nourished, no distress  HEENT:  NC/AT,  conjunctivae anicteric, clear and pink,   NECK: supple, trachea midline  CHEST:  Full & symmetric excursion, no increased effort, breath sounds clear  HEART:  Regular rhythm, no JVD  ABDOMEN:  Soft, non-tender, non-distended, normoactive bowel sounds,  no masses , no hepatosplenomegaly  EXTREMITIES:  no cyanosis,clubbing or edema  SKIN:  No rash, erythema, or, ecchymoses, no jaundice  NEURO:  Alert, non-focal, no asterixis  PSYCH: Appropriate affect, oriented to place and time  RECTAL: Deferred      LABS Personally reviewed by me:                        10.0   7.44  )-----------( 247      ( 29 Dec 2021 19:07 )             30.8     Mean Cell Volume: 89.5 fl (- @ 19:07)        134<L>  |  102  |  23  ----------------------------<  117<H>  5.6<H>   |  19<L>  |  1.39<H>    Ca    9.0      29 Dec 2021 09:20  Mg     2.0         TPro  7.5  /  Alb  3.1<L>  /  TBili  0.3  /  DBili  x   /  AST  23  /  ALT  18  /  AlkPhos  67      LIVER FUNCTIONS - ( 29 Dec 2021 09:20 )  Alb: 3.1 g/dL / Pro: 7.5 g/dL / ALK PHOS: 67 U/L / ALT: 18 U/L / AST: 23 U/L / GGT: x           PT/INR - ( 29 Dec 2021 12:50 )   PT: 11.9 sec;   INR: 0.99 ratio         PTT - ( 29 Dec 2021 12:50 )  PTT:28.4 sec  Urinalysis Basic - ( 29 Dec 2021 10:14 )    Color: Colorless / Appearance: Clear / S.010 / pH: x  Gluc: x / Ketone: Negative  / Bili: Negative / Urobili: Negative   Blood: x / Protein: 300 mg/dL / Nitrite: Negative   Leuk Esterase: Negative / RBC: 36 /hpf / WBC 3 /HPF   Sq Epi: x / Non Sq Epi: 2 /hpf / Bacteria: Negative                              10.0   7.44  )-----------( 247      ( 29 Dec 2021 19:07 )             30.8                         10.0   8.55  )-----------( 234      ( 29 Dec 2021 09:20 )             31.7       Imaging personally reviewed by me:          
  Chief Complaint:  Patient is a 71y old  Female who presents with a chief complaint of bleeding per rectum (30 Dec 2021 14:19)      Date of service 12-31-21 @ 11:31      Interval Events:   Patient seen and examined. Last BM yesterday which was brown. Patient denies pain. No abdominal tenderness noted.     Hospital Medications:  aspirin  chewable 81 milliGRAM(s) Oral daily  atorvastatin 10 milliGRAM(s) Oral at bedtime  carvedilol 25 milliGRAM(s) Oral every 12 hours  ciprofloxacin   IVPB      ciprofloxacin   IVPB 400 milliGRAM(s) IV Intermittent every 12 hours  dextrose 40% Gel 15 Gram(s) Oral once  dextrose 5%. 1000 milliLiter(s) IV Continuous <Continuous>  dextrose 5%. 1000 milliLiter(s) IV Continuous <Continuous>  dextrose 50% Injectable 25 Gram(s) IV Push once  dextrose 50% Injectable 12.5 Gram(s) IV Push once  dextrose 50% Injectable 25 Gram(s) IV Push once  donepezil 10 milliGRAM(s) Oral at bedtime  doxazosin 6 milliGRAM(s) Oral <User Schedule>  glucagon  Injectable 1 milliGRAM(s) IntraMuscular once  heparin   Injectable 5000 Unit(s) SubCutaneous every 12 hours  insulin glargine Injectable (LANTUS) 6 Unit(s) SubCutaneous at bedtime  insulin lispro (ADMELOG) corrective regimen sliding scale   SubCutaneous three times a day before meals  insulin lispro (ADMELOG) corrective regimen sliding scale   SubCutaneous at bedtime  isosorbide   dinitrate Tablet (ISORDIL) 10 milliGRAM(s) Oral three times a day  letrozole 2.5 milliGRAM(s) Oral daily  metroNIDAZOLE    Tablet      metroNIDAZOLE    Tablet 500 milliGRAM(s) Oral every 8 hours  pantoprazole    Tablet 40 milliGRAM(s) Oral before breakfast        Review of Systems:  General:  No wt loss, fevers, chills, night sweats, fatigue,   Eyes:  Good vision, no reported pain  ENT:  No sore throat, pain, runny nose, dysphagia  CV:  No pain, palpitations, hypo/hypertension  Resp:  No dyspnea, cough, tachypnea, wheezing  GI:  See HPI  :  No pain, bleeding, incontinence, nocturia  Muscle:  No pain, weakness  Neuro:  No weakness, tingling, memory problems  Psych:  No fatigue, insomnia, mood problems, depression  Endocrine:  No polyuria, polydipsia, cold/heat intolerance  Heme:  No petechiae, ecchymosis, easy bruisability  Integumentary:  No rash, edema    PHYSICAL EXAM:   Vital Signs:  Vital Signs Last 24 Hrs  T(C): 36.6 (31 Dec 2021 08:22), Max: 37.3 (31 Dec 2021 00:34)  T(F): 97.9 (31 Dec 2021 08:22), Max: 99.1 (31 Dec 2021 00:34)  HR: 72 (31 Dec 2021 08:22) (64 - 77)  BP: 190/80 (31 Dec 2021 11:05) (144/68 - 190/80)  BP(mean): --  RR: 18 (31 Dec 2021 08:22) (18 - 20)  SpO2: 93% (31 Dec 2021 08:22) (93% - 97%)  Daily     Daily       PHYSICAL EXAM:     GENERAL:  Appears stated age, well-groomed, well-nourished, no distress  HEENT:  NC/AT,  conjunctivae anicteric, clear and pink,   NECK: supple, trachea midline  CHEST:  Full & symmetric excursion, no increased effort, breath sounds clear  HEART:  Regular rhythm, no JVD  ABDOMEN:  Soft, non-tender, non-distended, normoactive bowel sounds,  no masses , no hepatosplenomegaly  EXTREMITIES:  no cyanosis,clubbing or edema  SKIN:  No rash, erythema, or, ecchymoses, no jaundice  NEURO:  Alert, non-focal, no asterixis  PSYCH: Appropriate affect, oriented to place and time  RECTAL: Deferred      LABS Personally reviewed by me:                        10.5   6.64  )-----------( 194      ( 31 Dec 2021 07:25 )             33.5     Mean Cell Volume: 93.1 fl (12-31-21 @ 07:25)            PT/INR - ( 29 Dec 2021 12:50 )   PT: 11.9 sec;   INR: 0.99 ratio         PTT - ( 29 Dec 2021 12:50 )  PTT:28.4 sec                            10.5   6.64  )-----------( 194      ( 31 Dec 2021 07:25 )             33.5                         10.0   7.44  )-----------( 247      ( 29 Dec 2021 19:07 )             30.8                         10.0   8.55  )-----------( 234      ( 29 Dec 2021 09:20 )             31.7       Imaging personally reviewed by me:          
Patient is a 71y old  Female who presents with a chief complaint of bleeding per rectum (2022 11:24)      INTERVAL HPI/OVERNIGHT EVENTS: noted  pt seen and examined this am   events noted  feels well      Vital Signs Last 24 Hrs  T(C): 37.2 (2022 12:39), Max: 37.2 (2022 12:39)  T(F): 98.9 (2022 12:39), Max: 98.9 (2022 12:39)  HR: 78 (2022 12:39) (72 - 87)  BP: 177/64 (2022 09:45) (151/79 - 177/64)  BP(mean): --  RR: 18 (2022 12:39) (18 - 18)  SpO2: 94% (2022 12:39) (94% - 96%)    aspirin  chewable 81 milliGRAM(s) Oral daily  atorvastatin 10 milliGRAM(s) Oral at bedtime  carvedilol 37.5 milliGRAM(s) Oral every 12 hours  ciprofloxacin     Tablet 250 milliGRAM(s) Oral two times a day  dextrose 40% Gel 15 Gram(s) Oral once  dextrose 5%. 1000 milliLiter(s) IV Continuous <Continuous>  dextrose 5%. 1000 milliLiter(s) IV Continuous <Continuous>  dextrose 50% Injectable 25 Gram(s) IV Push once  dextrose 50% Injectable 12.5 Gram(s) IV Push once  dextrose 50% Injectable 25 Gram(s) IV Push once  donepezil 10 milliGRAM(s) Oral at bedtime  doxazosin 6 milliGRAM(s) Oral <User Schedule>  glucagon  Injectable 1 milliGRAM(s) IntraMuscular once  heparin   Injectable 5000 Unit(s) SubCutaneous every 12 hours  insulin glargine Injectable (LANTUS) 16 Unit(s) SubCutaneous at bedtime  insulin lispro (ADMELOG) corrective regimen sliding scale   SubCutaneous three times a day before meals  insulin lispro (ADMELOG) corrective regimen sliding scale   SubCutaneous at bedtime  insulin lispro Injectable (ADMELOG) 4 Unit(s) SubCutaneous three times a day before meals  letrozole 2.5 milliGRAM(s) Oral daily  metroNIDAZOLE    Tablet 500 milliGRAM(s) Oral every 8 hours  multivitamin 1 Tablet(s) Oral daily  ondansetron Injectable 4 milliGRAM(s) IV Push every 8 hours PRN  pantoprazole    Tablet 40 milliGRAM(s) Oral before breakfast  sodium chloride 0.9%. 1000 milliLiter(s) IV Continuous <Continuous>      PHYSICAL EXAM:  GENERAL: NAD,   EYES: conjunctiva and sclera clear  ENMT: Moist mucous membranes  NECK: Supple, No JVD, Normal thyroid  CHEST/LUNG: non labored, cta b/l  HEART: Regular rate and rhythm; No murmurs, rubs, or gallops  ABDOMEN: Soft, Nontender, Nondistended; Bowel sounds present  EXTREMITIES:  2+ Peripheral Pulses, No clubbing, cyanosis, or edema  LYMPH: No lymphadenopathy noted  SKIN: No rashes or lesions    Consultant(s) Notes Reviewed:  [x ] YES  [ ] NO  Care Discussed with Consultants/Other Providers [ x] YES  [ ] NO    LABS:                        9.1    5.08  )-----------( 209      ( 2022 07:26 )             29.1     -    138  |  105  |  21  ----------------------------<  167<H>  4.3   |  21<L>  |  1.65<H>    Ca    8.3<L>      2022 07:26        Urinalysis Basic - ( 2022 19:11 )    Color: Yellow / Appearance: Clear / S.017 / pH: x  Gluc: x / Ketone: Negative  / Bili: Negative / Urobili: Negative   Blood: x / Protein: 300 mg/dL / Nitrite: Negative   Leuk Esterase: Negative / RBC: 3 /hpf / WBC 2 /HPF   Sq Epi: x / Non Sq Epi: 2 /hpf / Bacteria: Negative      CAPILLARY BLOOD GLUCOSE      POCT Blood Glucose.: 154 mg/dL (2022 12:09)  POCT Blood Glucose.: 213 mg/dL (2022 08:23)  POCT Blood Glucose.: 384 mg/dL (2022 22:30)  POCT Blood Glucose.: 408 mg/dL (2022 21:39)  POCT Blood Glucose.: 404 mg/dL (2022 21:38)  POCT Blood Glucose.: 314 mg/dL (2022 17:00)        Urinalysis Basic - ( 2022 19:11 )    Color: Yellow / Appearance: Clear / S.017 / pH: x  Gluc: x / Ketone: Negative  / Bili: Negative / Urobili: Negative   Blood: x / Protein: 300 mg/dL / Nitrite: Negative   Leuk Esterase: Negative / RBC: 3 /hpf / WBC 2 /HPF   Sq Epi: x / Non Sq Epi: 2 /hpf / Bacteria: Negative        GI PCR Panel, Stool (collected 30 Dec 2021 23:05)  Source: .Stool Feces  Final Report (31 Dec 2021 10:49):    GI PCR Results: NOT detected    *******Please Note:*******    GI panel PCR evaluates for:    Campylobacter, Plesiomonas shigelloides, Salmonella,    Vibrio, Yersinia enterocolitica, Enteroaggregative    Escherichia coli (EAEC), Enteropathogenic E.coli (EPEC),    Enterotoxigenic E. coli (ETEC) lt/st, Shiga-like    toxin-producing E. coli (STEC) stx1/stx2,    Shigella/ Enteroinvasive E. coli (EIEC), Cryptosporidium,    Cyclospora cayetanensis, Entamoeba histolytica,    Giardia lamblia, Adenovirus F 40/41, Astrovirus,    Norovirus GI/GII, Rotavirus A, Sapovirus        RADIOLOGY & ADDITIONAL TESTS:    Imaging Personally Reviewed:  [x ] YES  [ ] NO
Patient is a 71y old  Female who presents with a chief complaint of bleeding per rectum (2022 11:56)      INTERVAL HPI/OVERNIGHT EVENTS: noted  pt seen and examined this am   events noted  BP elevated   pt denies HA/n/v/cp/sob      Vital Signs Last 24 Hrs  T(C): 37.1 (2022 08:59), Max: 37.1 (2022 08:59)  T(F): 98.7 (2022 08:59), Max: 98.7 (2022 08:59)  HR: 76 (2022 08:59) (66 - 82)  BP: 175/69 (2022 09:21) (151/82 - 208/78)  BP(mean): --  RR: 20 (2022 08:59) (17 - 20)  SpO2: 97% (2022 08:59) (93% - 97%)    aspirin  chewable 81 milliGRAM(s) Oral daily  atorvastatin 10 milliGRAM(s) Oral at bedtime  carvedilol 37.5 milliGRAM(s) Oral every 12 hours  ciprofloxacin     Tablet 250 milliGRAM(s) Oral two times a day  dextrose 40% Gel 15 Gram(s) Oral once  dextrose 5%. 1000 milliLiter(s) IV Continuous <Continuous>  dextrose 5%. 1000 milliLiter(s) IV Continuous <Continuous>  dextrose 50% Injectable 25 Gram(s) IV Push once  dextrose 50% Injectable 12.5 Gram(s) IV Push once  dextrose 50% Injectable 25 Gram(s) IV Push once  donepezil 10 milliGRAM(s) Oral at bedtime  doxazosin 6 milliGRAM(s) Oral <User Schedule>  glucagon  Injectable 1 milliGRAM(s) IntraMuscular once  heparin   Injectable 5000 Unit(s) SubCutaneous every 12 hours  insulin glargine Injectable (LANTUS) 16 Unit(s) SubCutaneous at bedtime  insulin lispro (ADMELOG) corrective regimen sliding scale   SubCutaneous three times a day before meals  insulin lispro (ADMELOG) corrective regimen sliding scale   SubCutaneous at bedtime  insulin lispro Injectable (ADMELOG) 4 Unit(s) SubCutaneous three times a day before meals  letrozole 2.5 milliGRAM(s) Oral daily  metroNIDAZOLE    Tablet 500 milliGRAM(s) Oral every 8 hours  multivitamin 1 Tablet(s) Oral daily  ondansetron Injectable 4 milliGRAM(s) IV Push every 8 hours PRN  pantoprazole    Tablet 40 milliGRAM(s) Oral before breakfast      PHYSICAL EXAM:  GENERAL: NAD,   EYES: conjunctiva and sclera clear  ENMT: Moist mucous membranes  NECK: Supple, No JVD, Normal thyroid  CHEST/LUNG: non labored, cta b/l  HEART: Regular rate and rhythm; No murmurs, rubs, or gallops  ABDOMEN: Soft, Nontender, Nondistended; Bowel sounds present  EXTREMITIES:  2+ Peripheral Pulses, No clubbing, cyanosis, or edema  LYMPH: No lymphadenopathy noted  SKIN: No rashes or lesions    Consultant(s) Notes Reviewed:  [x ] YES  [ ] NO  Care Discussed with Consultants/Other Providers [ x] YES  [ ] NO    LABS:                        9.7    5.82  )-----------( 214      ( 2022 06:22 )             31.0     01-03    140  |  109<H>  |  23  ----------------------------<  220<H>  4.8   |  20<L>  |  1.51<H>    Ca    8.3<L>      2022 06:22        Urinalysis Basic - ( 2022 19:11 )    Color: Yellow / Appearance: Clear / S.017 / pH: x  Gluc: x / Ketone: Negative  / Bili: Negative / Urobili: Negative   Blood: x / Protein: 300 mg/dL / Nitrite: Negative   Leuk Esterase: Negative / RBC: 3 /hpf / WBC 2 /HPF   Sq Epi: x / Non Sq Epi: 2 /hpf / Bacteria: Negative      CAPILLARY BLOOD GLUCOSE      POCT Blood Glucose.: 180 mg/dL (2022 12:19)  POCT Blood Glucose.: 272 mg/dL (2022 07:55)  POCT Blood Glucose.: 217 mg/dL (2022 22:12)  POCT Blood Glucose.: 238 mg/dL (2022 16:56)        Urinalysis Basic - ( 2022 19:11 )    Color: Yellow / Appearance: Clear / S.017 / pH: x  Gluc: x / Ketone: Negative  / Bili: Negative / Urobili: Negative   Blood: x / Protein: 300 mg/dL / Nitrite: Negative   Leuk Esterase: Negative / RBC: 3 /hpf / WBC 2 /HPF   Sq Epi: x / Non Sq Epi: 2 /hpf / Bacteria: Negative          RADIOLOGY & ADDITIONAL TESTS:    Imaging Personally Reviewed:  [x ] YES  [ ] NO
Patient is a 71y old  Female who presents with a chief complaint of bleeding per rectum (30 Dec 2021 12:12)      INTERVAL HPI/OVERNIGHT EVENTS: noted  pt seen and examined this am   events noted  no furthur bloody bm  denies abd pain      Vital Signs Last 24 Hrs  T(C): 37 (30 Dec 2021 12:38), Max: 37 (30 Dec 2021 12:38)  T(F): 98.6 (30 Dec 2021 12:38), Max: 98.6 (30 Dec 2021 12:38)  HR: 77 (30 Dec 2021 12:38) (74 - 88)  BP: 165/73 (30 Dec 2021 12:38) (140/82 - 194/69)  BP(mean): --  RR: 18 (30 Dec 2021 12:38) (18 - 18)  SpO2: 97% (30 Dec 2021 12:38) (95% - 98%)    aspirin  chewable 81 milliGRAM(s) Oral daily  atorvastatin 10 milliGRAM(s) Oral at bedtime  carvedilol 25 milliGRAM(s) Oral every 12 hours  ciprofloxacin   IVPB      dextrose 40% Gel 15 Gram(s) Oral once  dextrose 5%. 1000 milliLiter(s) IV Continuous <Continuous>  dextrose 5%. 1000 milliLiter(s) IV Continuous <Continuous>  dextrose 50% Injectable 25 Gram(s) IV Push once  dextrose 50% Injectable 12.5 Gram(s) IV Push once  dextrose 50% Injectable 25 Gram(s) IV Push once  donepezil 10 milliGRAM(s) Oral at bedtime  glucagon  Injectable 1 milliGRAM(s) IntraMuscular once  insulin glargine Injectable (LANTUS) 6 Unit(s) SubCutaneous at bedtime  insulin lispro (ADMELOG) corrective regimen sliding scale   SubCutaneous three times a day before meals  insulin lispro (ADMELOG) corrective regimen sliding scale   SubCutaneous at bedtime  letrozole 2.5 milliGRAM(s) Oral daily  metroNIDAZOLE    Tablet      metroNIDAZOLE    Tablet 500 milliGRAM(s) Oral once  metroNIDAZOLE    Tablet 500 milliGRAM(s) Oral every 8 hours  pantoprazole    Tablet 40 milliGRAM(s) Oral before breakfast      PHYSICAL EXAM:  GENERAL: NAD,   EYES: conjunctiva and sclera clear  ENMT: Moist mucous membranes  NECK: Supple, No JVD, Normal thyroid  CHEST/LUNG: non labored, cta b/l  HEART: Regular rate and rhythm; No murmurs, rubs, or gallops  ABDOMEN: Soft, Nontender, Nondistended; Bowel sounds present  EXTREMITIES:  2+ Peripheral Pulses, No clubbing, cyanosis, or edema  LYMPH: No lymphadenopathy noted  SKIN: No rashes or lesions    Consultant(s) Notes Reviewed:  [x ] YES  [ ] NO  Care Discussed with Consultants/Other Providers [ x] YES  [ ] NO    LABS:                        10.0   7.44  )-----------( 247      ( 29 Dec 2021 19:07 )             30.8     12    134<L>  |  102  |  23  ----------------------------<  117<H>  5.6<H>   |  19<L>  |  1.39<H>    Ca    9.0      29 Dec 2021 09:20  Mg     2.0         TPro  7.5  /  Alb  3.1<L>  /  TBili  0.3  /  DBili  x   /  AST  23  /  ALT  18  /  AlkPhos  67  12    PT/INR - ( 29 Dec 2021 12:50 )   PT: 11.9 sec;   INR: 0.99 ratio         PTT - ( 29 Dec 2021 12:50 )  PTT:28.4 sec  Urinalysis Basic - ( 29 Dec 2021 10:14 )    Color: Colorless / Appearance: Clear / S.010 / pH: x  Gluc: x / Ketone: Negative  / Bili: Negative / Urobili: Negative   Blood: x / Protein: 300 mg/dL / Nitrite: Negative   Leuk Esterase: Negative / RBC: 36 /hpf / WBC 3 /HPF   Sq Epi: x / Non Sq Epi: 2 /hpf / Bacteria: Negative      CAPILLARY BLOOD GLUCOSE      POCT Blood Glucose.: 206 mg/dL (30 Dec 2021 11:57)  POCT Blood Glucose.: 153 mg/dL (30 Dec 2021 08:00)  POCT Blood Glucose.: 142 mg/dL (29 Dec 2021 23:16)  POCT Blood Glucose.: 169 mg/dL (29 Dec 2021 22:05)  POCT Blood Glucose.: 129 mg/dL (29 Dec 2021 18:09)        Urinalysis Basic - ( 29 Dec 2021 10:14 )    Color: Colorless / Appearance: Clear / S.010 / pH: x  Gluc: x / Ketone: Negative  / Bili: Negative / Urobili: Negative   Blood: x / Protein: 300 mg/dL / Nitrite: Negative   Leuk Esterase: Negative / RBC: 36 /hpf / WBC 3 /HPF   Sq Epi: x / Non Sq Epi: 2 /hpf / Bacteria: Negative        Culture - Urine (collected 29 Dec 2021 12:37)  Source: Clean Catch Clean Catch (Midstream)  Final Report (30 Dec 2021 11:35):    <10,000 CFU/mL Normal Urogenital Cate        RADIOLOGY & ADDITIONAL TESTS:    Imaging Personally Reviewed:  [x ] YES  [ ] NO

## 2022-01-04 NOTE — PROGRESS NOTE ADULT - PROVIDER SPECIALTY LIST ADULT
Gastroenterology
Internal Medicine
Gastroenterology
Internal Medicine
Internal Medicine
Gastroenterology
Gastroenterology
Internal Medicine

## 2022-01-04 NOTE — PROGRESS NOTE ADULT - ASSESSMENT
72 y/o F w/ hx of DM2, HTN, breast CA (dx in 2/2017) sp b/l  mastectomy and LN resection  jan/2018-hx respiratory arrest 2/2 influenza, complicated by aspiration pna, PEA arrest x2 , CVA w residual Rt leg weakness and rt eye visual loss, PE s/p tPA, ARF requiring HD, s/p trach and PEG tube, 5/18 peg and trach removed  late 2020- 2nd CVA -neurologist- Dr Prajapati , ILR placed aug/21  aw hematochesia- stool mixed w blood and mucus x5 since last night, +abd pain relieved w BM  hx of hemorrhoidectomy/fissurectomy>20yrs ago    #lower GIbleed  Hematochesia- resolved  stable hb, slight drop in h/h today  r/o colitis  CT a/p proctitis   Gi cs appreciated-fu recs- started cipro and flagyl  hold off on invasive procedures for now, family agreeable  advance diet as tolerated  monitor CBC, active t&S  to transfuse for symptoms or hb <8      #CVA hx  sp ILR placed recently  monitor-no events  resume asa, statin    #Cards- cardiac arrest hx  last Echo 2018 reviewed  stable  sees Dr Dunn    #RUSS- i/o, bladder scan- no retention  UA, gentle ivf  check urine lytes  likely ATN/AIN- med induced-   change cipro to 250bid  cont flagyl 500tid  check BMP later today    #HTN- bp uncontrolled  home carvedilol increased to 37.5 bid  cardura change to 6mg qid if ok w pharmacy    #DM-2   dc humulin and metformin-home meds  started lantus and correction scale  a1c    #Breast ca sp b/l mastectomy  resume letrazole    #DVT ppx-   heparin sq    If cr improving and BP stable- plan to dc home later today  d/w family    [facility]  [phone]

## 2022-01-04 NOTE — PROGRESS NOTE ADULT - ASSESSMENT
71 year old female with rectal bleeding    1. Rectal bleeding. Pt has some debility from stroke, would not be able to tolerate prep. May be self limited colitis. Pt family wants to hold off on invasive procedures. Rectal exam w brown stool right now.  -Proctocolitis on CT, likely infectious vs. stercoral colitis, continue Cipro and Flagyl 7 day course outpatient  -GI PCR not detected  -normal BM last night; no melena, brbpr  -DC planning    2. CVA    3. Anemia, stools now brown  -hgb stable  -monitor for GI bleeding  -trend CBCs    Attending supervision statement: I have personally seen and examined the patient. I fully participated in the care of this patient. I have made amendments to the documentation where necessary, and agree with the history, physical exam, and plan as outlined by the ACP.      Santos Lux M.D.   Gastroenterology and Hepatology  [address] [place], [place]  Office: [phone]  Cell: [phone]

## 2022-01-04 NOTE — PROVIDER CONTACT NOTE (OTHER) - ACTION/TREATMENT ORDERED:
Coreg to be administered early.
provider came by bedside to assess pt.     ordered to given am dose of carvedilol at this time
As per NP Irasema, admin cardura as per order, reassess BP in 1 hr.
NP aware; will order home dose of Cardura; continue to monitor
As per NP Bambi Villalpando, NP aware, NP w/ speak to attending about care.

## 2022-01-20 PROBLEM — I10 ESSENTIAL (PRIMARY) HYPERTENSION: Chronic | Status: ACTIVE | Noted: 2021-12-29

## 2022-01-21 NOTE — H&P ADULT. - PROBLEM/PLAN-1
[Initial Consult] : an initial consult for [Morbid Obesity (BMI>40)] : morbid obesity (bmi>40) DISPLAY PLAN FREE TEXT

## 2022-02-04 ENCOUNTER — APPOINTMENT (OUTPATIENT)
Dept: NEUROLOGY | Facility: CLINIC | Age: 72
End: 2022-02-04

## 2022-02-04 NOTE — PROVIDER CONTACT NOTE (OTHER) - REASON
RR 35 Azathioprine Counseling:  I discussed with the patient the risks of azathioprine including but not limited to myelosuppression, immunosuppression, hepatotoxicity, lymphoma, and infections.  The patient understands that monitoring is required including baseline LFTs, Creatinine, possible TPMP genotyping and weekly CBCs for the first month and then every 2 weeks thereafter.  The patient verbalized understanding of the proper use and possible adverse effects of azathioprine.  All of the patient's questions and concerns were addressed.

## 2022-03-18 ENCOUNTER — APPOINTMENT (OUTPATIENT)
Dept: NEUROLOGY | Facility: CLINIC | Age: 72
End: 2022-03-18
Payer: MEDICARE

## 2022-03-18 VITALS — HEIGHT: 61 IN | SYSTOLIC BLOOD PRESSURE: 190 MMHG | DIASTOLIC BLOOD PRESSURE: 70 MMHG

## 2022-03-18 DIAGNOSIS — R25.1 TREMOR, UNSPECIFIED: ICD-10-CM

## 2022-03-18 PROCEDURE — 99215 OFFICE O/P EST HI 40 MIN: CPT

## 2022-03-21 NOTE — HISTORY OF PRESENT ILLNESS
[FreeTextEntry1] : Patient was referred to me for evaluation of tremors\par \par Patient had a PEA in 2018 ROSC after 30minutes. She is s/p trach and PEG. During the hospitalization she developed head tremor and moved into her hands 1 year ago.  Brain brain 2021 showed a new PCA infarct. \par \par she ia able to walk with a walker. Needs assistance to stand. She can feed herself. She needs help with hygiene and dressing. Able to do some exercises. Sleeps 16-18 years a day\par \par She is not functionally limited by the tremors. states that she is not bothered by head tremor. Denies any vertigo. \par \par Nonmotor:\par - constipation\par - rbd\par \par PMH: breast can s/p mastectomy\par Meds:\par Asa81, lipitor, coreg,colace, aricept 10mg , doxazosin, insulin , protonix

## 2022-03-21 NOTE — DISCUSSION/SUMMARY
[FreeTextEntry1] : Patient with head tremor that has change of state function, directionality, and sensory geste suggestive of cervical dystonia. Slowing of her movements with subtle rest tremor indicates possible parkinsonism. \par \par Patient was counseled on the following recommendations:\par \par Trial of levodopa: sinemet  1/2 tab TID and titrate to 1 tab TID over 4 weeks by 1/2 tab/week\par Head tremor not bothersome to her and will defer recommendation for BoNT\par cont PT\par \par f/u 3months

## 2022-03-21 NOTE — DATA REVIEWED
[de-identified] : 2021: cortical and CBL atrophy. Left PCA stroke,. extensive periventricular changes

## 2022-03-21 NOTE — PHYSICAL EXAM
[FreeTextEntry1] : There is a no-no head tremor at rest that abates when she turns her head to the left. Right shoulder is elevated. Tends to keep her head bent and does not make much eye contact. EOMI with right hemianopsia. When she touches her cheek the head tremor dampens. There is a 1 + right hand rest tremor with involvement of the thumb and index finger. Right leg has AFO. UE strength 5/5 except for right deltoid (3-4+ from mastectomy). Right side movements appear mildly slower with no evidence of rigidity. Gait deferred\par \par Review of home video, shows patient using a walker and taking slow, short wide base steps. Head is partially flexed as well with reduction in tremor\par \par Neck extensors/flexors 5/5

## 2022-04-05 ENCOUNTER — RX RENEWAL (OUTPATIENT)
Age: 72
End: 2022-04-05

## 2022-04-08 NOTE — OCCUPATIONAL THERAPY INITIAL EVALUATION ADULT - MANUAL MUSCLE TESTING RESULTS, REHAB EVAL
grossly assessed due to/RUE at least 3/5; LLE at least 3/5; R knee 3-/5; R ankle 0/5; L shoulder/elbow 2+/5; L wrist/digits 1/5 no enlarged lymph nodes/no tender lymph nodes/no swelling of extremity

## 2022-04-21 NOTE — PROGRESS NOTE ADULT - SUBJECTIVE AND OBJECTIVE BOX
CHIEF COMPLAINT: Influenza PNA c/b in hospital PEA arrest x 2 with possible anoxic brain injury, persistent left sided PTX (moderate in size), pneumoperitoneum    Interval Events: failed SBT this AM. Completely apneic off of vent. Off of pressors. Bilateral DVT study negative.     REVIEW OF SYSTEMS:  Constitutional: [ ] negative [ ] fevers [ ] chills [ ] weight loss [ ] weight gain  HEENT: [ ] negative [ ] dry eyes [ ] eye irritation [ ] postnasal drip [ ] nasal congestion  CV: [ ] negative  [ ] chest pain [ ] orthopnea [ ] palpitations [ ] murmur  Resp: [ ] negative [ ] cough [ ] shortness of breath [ ] dyspnea [ ] wheezing [ ] sputum [ ] hemoptysis  GI: [ ] negative [ ] nausea [ ] vomiting [ ] diarrhea [ ] constipation [ ] abd pain [ ] dysphagia   : [ ] negative [ ] dysuria [ ] nocturia [ ] hematuria [ ] increased urinary frequency  Musculoskeletal: [ ] negative [ ] back pain [ ] myalgias [ ] arthralgias [ ] fracture  Skin: [ ] negative [ ] rash [ ] itch  Neurological: [ ] negative [ ] headache [ ] dizziness [ ] syncope [ ] weakness [ ] numbness  Psychiatric: [ ] negative [ ] anxiety [ ] depression  Endocrine: [ ] negative [ ] diabetes [ ] thyroid problem  Hematologic/Lymphatic: [ ] negative [ ] anemia [ ] bleeding problem  Allergic/Immunologic: [ ] negative [ ] itchy eyes [ ] nasal discharge [ ] hives [ ] angioedema  [ ] All other systems negative  [ x] Unable to assess ROS because pt intubated    OBJECTIVE:  ICU Vital Signs Last 24 Hrs  T(C): 37.2 (03 Feb 2018 06:00), Max: 38.8 (03 Feb 2018 04:00)  T(F): 98.9 (03 Feb 2018 06:00), Max: 101.8 (03 Feb 2018 04:00)  HR: 96 (03 Feb 2018 07:00) (58 - 96)  BP: 164/68 (03 Feb 2018 07:00) (88/50 - 231/93)  BP(mean): 98 (03 Feb 2018 07:00) (68 - 133)  ABP: --  ABP(mean): --  RR: 24 (03 Feb 2018 07:00) (24 - 24)  SpO2: 100% (03 Feb 2018 07:00) (90% - 100%)    Mode: AC/ CMV (Assist Control/ Continuous Mandatory Ventilation), RR (machine): 24, TV (machine): 400, FiO2: 30, PEEP: 5, ITime: 0.8, MAP: 14, PIP: 32    02-02 @ 07:01  -  02-03 @ 07:00  --------------------------------------------------------  IN: 409.9 mL / OUT: 622 mL / NET: -212.1 mL      CAPILLARY BLOOD GLUCOSE  147 (03 Feb 2018 06:00)      POCT Blood Glucose.: 147 mg/dL (03 Feb 2018 05:15)      PHYSICAL EXAM:  General: NAD  Respiratory: intubated. Bandages over previous chest tube sites. Left sided medi port. New left sided chest tube set to suction.   Cardiovascular: RRR no MRG  Abdominal: Soft, distended. Bandage located on LLQ over area of previous abd drain.   Extremities: Warm  Skin: right IJ Shiley catheter  Neuro: PERRL however sluggish. Moves RUE minimally at wrist however does not move other extremities. Negative babinski bilaterally. Does not follow commands or track on exam.       LINES:    HOSPITAL MEDICATIONS:  Standing Meds:  ALBUTerol/ipratropium for Nebulization 3 milliLiter(s) Nebulizer every 6 hours  aspirin  chewable 81 milliGRAM(s) Oral daily  dexmedetomidine Infusion 0.107 MICROgram(s)/kG/Hr IV Continuous <Continuous>  dextrose 50% Injectable 25 Gram(s) IV Push once  fentaNYL   Infusion 0.5 MICROgram(s)/kG/Hr IV Continuous <Continuous>  heparin  Injectable 5000 Unit(s) SubCutaneous every 8 hours  insulin glargine Injectable (LANTUS) 15 Unit(s) SubCutaneous at bedtime  insulin lispro (HumaLOG) corrective regimen sliding scale   SubCutaneous every 6 hours  methylPREDNISolone sodium succinate Injectable 40 milliGRAM(s) IV Push daily  oseltamivir Suspension 30 milliGRAM(s) Oral every 12 hours  pantoprazole  Injectable 40 milliGRAM(s) IV Push every 12 hours  piperacillin/tazobactam IVPB. 3.375 Gram(s) IV Intermittent every 12 hours  polyethylene glycol 3350 17 Gram(s) Oral two times a day  propofol Infusion 10 MICROgram(s)/kG/Min IV Continuous <Continuous>  senna 2 Tablet(s) Oral at bedtime      PRN Meds:  chlorhexidine 0.12% Liquid 15 milliLiter(s) Swish and Spit every 4 hours PRN  midazolam Injectable 2 milliGRAM(s) IV Push every 2 hours PRN      LABS:                        7.9    4.1   )-----------( 116      ( 03 Feb 2018 02:03 )             22.9     Hgb Trend: 7.9<--, 8.3<--, 8.7<--, 9.2<--, 9.3<--  02-03    137  |  97  |  34<H>  ----------------------------<  125<H>  3.3<L>   |  21<L>  |  2.92<H>    Ca    8.2<L>      03 Feb 2018 02:03  Phos  4.3     02-03  Mg     2.0     02-03    TPro  5.7<L>  /  Alb  2.2<L>  /  TBili  2.0<H>  /  DBili  x   /  AST  1195<H>  /  ALT  2071<H>  /  AlkPhos  69  02-03    Creatinine Trend: 2.92<--, 4.22<--, 3.59<--, 3.03<--, 4.04<--, 3.54<--  PT/INR - ( 02 Feb 2018 17:31 )   PT: 12.2 sec;   INR: 1.12 ratio         PTT - ( 02 Feb 2018 17:31 )  PTT:25.4 sec    Arterial Blood Gas:  02-02 @ 08:00  7.36/32/108/18/98/-6.6  ABG lactate: --  Arterial Blood Gas:  02-01 @ 12:39  7.20/47/78/17/93/-9.8  ABG lactate: --    Venous Blood Gas:  02-01 @ 21:49  7.30/46/41/22/68  VBG Lactate: --  Venous Blood Gas:  02-01 @ 16:58  7.23/40/43/16/68  VBG Lactate: 1.6      MICROBIOLOGY:     Culture - Sputum (collected 02 Feb 2018 19:04)  Source: .Sputum Sputum  Gram Stain (02 Feb 2018 22:32):    Rare polymorphonuclear leukocytes per low power field    No squamous epithelial cells per low power field    No organisms seen    Culture - Blood (collected 01 Feb 2018 22:46)  Source: .Blood Blood-Venous  Preliminary Report (02 Feb 2018 23:02):    No growth to date.    Culture - Blood (collected 01 Feb 2018 22:46)  Source: .Blood Blood-Peripheral  Preliminary Report (02 Feb 2018 23:02):    No growth to date. CHIEF COMPLAINT: Influenza PNA c/b in hospital PEA arrest x 2 with possible anoxic brain injury, persistent left sided PTX (moderate in size), pneumoperitoneum    Interval Events: failed SBT this AM. Off of pressors. Bilateral DVT study negative.     REVIEW OF SYSTEMS:  Constitutional: [ ] negative [ ] fevers [ ] chills [ ] weight loss [ ] weight gain  HEENT: [ ] negative [ ] dry eyes [ ] eye irritation [ ] postnasal drip [ ] nasal congestion  CV: [ ] negative  [ ] chest pain [ ] orthopnea [ ] palpitations [ ] murmur  Resp: [ ] negative [ ] cough [ ] shortness of breath [ ] dyspnea [ ] wheezing [ ] sputum [ ] hemoptysis  GI: [ ] negative [ ] nausea [ ] vomiting [ ] diarrhea [ ] constipation [ ] abd pain [ ] dysphagia   : [ ] negative [ ] dysuria [ ] nocturia [ ] hematuria [ ] increased urinary frequency  Musculoskeletal: [ ] negative [ ] back pain [ ] myalgias [ ] arthralgias [ ] fracture  Skin: [ ] negative [ ] rash [ ] itch  Neurological: [ ] negative [ ] headache [ ] dizziness [ ] syncope [ ] weakness [ ] numbness  Psychiatric: [ ] negative [ ] anxiety [ ] depression  Endocrine: [ ] negative [ ] diabetes [ ] thyroid problem  Hematologic/Lymphatic: [ ] negative [ ] anemia [ ] bleeding problem  Allergic/Immunologic: [ ] negative [ ] itchy eyes [ ] nasal discharge [ ] hives [ ] angioedema  [ ] All other systems negative  [ x] Unable to assess ROS because pt intubated    OBJECTIVE:  ICU Vital Signs Last 24 Hrs  T(C): 37.2 (03 Feb 2018 06:00), Max: 38.8 (03 Feb 2018 04:00)  T(F): 98.9 (03 Feb 2018 06:00), Max: 101.8 (03 Feb 2018 04:00)  HR: 96 (03 Feb 2018 07:00) (58 - 96)  BP: 164/68 (03 Feb 2018 07:00) (88/50 - 231/93)  BP(mean): 98 (03 Feb 2018 07:00) (68 - 133)  ABP: --  ABP(mean): --  RR: 24 (03 Feb 2018 07:00) (24 - 24)  SpO2: 100% (03 Feb 2018 07:00) (90% - 100%)    Mode: AC/ CMV (Assist Control/ Continuous Mandatory Ventilation), RR (machine): 24, TV (machine): 400, FiO2: 30, PEEP: 5, ITime: 0.8, MAP: 14, PIP: 32    02-02 @ 07:01  -  02-03 @ 07:00  --------------------------------------------------------  IN: 409.9 mL / OUT: 622 mL / NET: -212.1 mL      CAPILLARY BLOOD GLUCOSE  147 (03 Feb 2018 06:00)      POCT Blood Glucose.: 147 mg/dL (03 Feb 2018 05:15)      PHYSICAL EXAM:  General: NAD  Respiratory: intubated. Bandages over previous chest tube sites. Left sided medi port. New left sided chest tube set to suction.   Cardiovascular: RRR no MRG  Abdominal: Soft, distended. Bandage located on LLQ over area of previous abd drain.   Extremities: Warm  Skin: right IJ Shiley catheter  Neuro: PERRL however sluggish. Moves RUE minimally at wrist however does not move other extremities. Negative babinski bilaterally. Does not follow commands or track on exam.       LINES:    HOSPITAL MEDICATIONS:  Standing Meds:  ALBUTerol/ipratropium for Nebulization 3 milliLiter(s) Nebulizer every 6 hours  aspirin  chewable 81 milliGRAM(s) Oral daily  dexmedetomidine Infusion 0.107 MICROgram(s)/kG/Hr IV Continuous <Continuous>  dextrose 50% Injectable 25 Gram(s) IV Push once  fentaNYL   Infusion 0.5 MICROgram(s)/kG/Hr IV Continuous <Continuous>  heparin  Injectable 5000 Unit(s) SubCutaneous every 8 hours  insulin glargine Injectable (LANTUS) 15 Unit(s) SubCutaneous at bedtime  insulin lispro (HumaLOG) corrective regimen sliding scale   SubCutaneous every 6 hours  methylPREDNISolone sodium succinate Injectable 40 milliGRAM(s) IV Push daily  oseltamivir Suspension 30 milliGRAM(s) Oral every 12 hours  pantoprazole  Injectable 40 milliGRAM(s) IV Push every 12 hours  piperacillin/tazobactam IVPB. 3.375 Gram(s) IV Intermittent every 12 hours  polyethylene glycol 3350 17 Gram(s) Oral two times a day  propofol Infusion 10 MICROgram(s)/kG/Min IV Continuous <Continuous>  senna 2 Tablet(s) Oral at bedtime      PRN Meds:  chlorhexidine 0.12% Liquid 15 milliLiter(s) Swish and Spit every 4 hours PRN  midazolam Injectable 2 milliGRAM(s) IV Push every 2 hours PRN      LABS:                        7.9    4.1   )-----------( 116      ( 03 Feb 2018 02:03 )             22.9     Hgb Trend: 7.9<--, 8.3<--, 8.7<--, 9.2<--, 9.3<--  02-03    137  |  97  |  34<H>  ----------------------------<  125<H>  3.3<L>   |  21<L>  |  2.92<H>    Ca    8.2<L>      03 Feb 2018 02:03  Phos  4.3     02-03  Mg     2.0     02-03    TPro  5.7<L>  /  Alb  2.2<L>  /  TBili  2.0<H>  /  DBili  x   /  AST  1195<H>  /  ALT  2071<H>  /  AlkPhos  69  02-03    Creatinine Trend: 2.92<--, 4.22<--, 3.59<--, 3.03<--, 4.04<--, 3.54<--  PT/INR - ( 02 Feb 2018 17:31 )   PT: 12.2 sec;   INR: 1.12 ratio         PTT - ( 02 Feb 2018 17:31 )  PTT:25.4 sec    Arterial Blood Gas:  02-02 @ 08:00  7.36/32/108/18/98/-6.6  ABG lactate: --  Arterial Blood Gas:  02-01 @ 12:39  7.20/47/78/17/93/-9.8  ABG lactate: --    Venous Blood Gas:  02-01 @ 21:49  7.30/46/41/22/68  VBG Lactate: --  Venous Blood Gas:  02-01 @ 16:58  7.23/40/43/16/68  VBG Lactate: 1.6      MICROBIOLOGY:     Culture - Sputum (collected 02 Feb 2018 19:04)  Source: .Sputum Sputum  Gram Stain (02 Feb 2018 22:32):    Rare polymorphonuclear leukocytes per low power field    No squamous epithelial cells per low power field    No organisms seen    Culture - Blood (collected 01 Feb 2018 22:46)  Source: .Blood Blood-Venous  Preliminary Report (02 Feb 2018 23:02):    No growth to date.    Culture - Blood (collected 01 Feb 2018 22:46)  Source: .Blood Blood-Peripheral  Preliminary Report (02 Feb 2018 23:02):    No growth to date. no palpable cervical lymphadenopathy

## 2022-04-26 ENCOUNTER — RX RENEWAL (OUTPATIENT)
Age: 72
End: 2022-04-26

## 2022-04-26 ENCOUNTER — NON-APPOINTMENT (OUTPATIENT)
Age: 72
End: 2022-04-26

## 2022-05-10 ENCOUNTER — NON-APPOINTMENT (OUTPATIENT)
Age: 72
End: 2022-05-10

## 2022-06-24 ENCOUNTER — NON-APPOINTMENT (OUTPATIENT)
Age: 72
End: 2022-06-24

## 2022-06-24 ENCOUNTER — APPOINTMENT (OUTPATIENT)
Dept: NEUROLOGY | Facility: CLINIC | Age: 72
End: 2022-06-24

## 2022-06-30 ENCOUNTER — APPOINTMENT (OUTPATIENT)
Dept: NEUROLOGY | Facility: CLINIC | Age: 72
End: 2022-06-30

## 2022-07-05 ENCOUNTER — APPOINTMENT (OUTPATIENT)
Dept: NEUROLOGY | Facility: CLINIC | Age: 72
End: 2022-07-05

## 2022-07-05 PROCEDURE — 99214 OFFICE O/P EST MOD 30 MIN: CPT | Mod: 95

## 2022-07-05 NOTE — HISTORY OF PRESENT ILLNESS
[FreeTextEntry1] : Patient took Sinemet  1/2 tab TID and her  reports that she became restless and irritable. Since stopping the medication, these symptoms have improved. \par She walks about 80steps per day with a walker. \par Head tremor is not bothersome\par Hand tremor can limit her ability to hold a cup at times. She continues to feed herself\par can have short conversation but overall expressivity is limited. takes aricept 10mg for cognitive problems\par \par Nonmotor:\par - constipation\par - rbd\par \par PMH: breast can s/p mastectomy\par Meds:\par Asa81, lipitor, coreg,colace, aricept 10mg , doxazosin, insulin , protonix

## 2022-07-05 NOTE — REASON FOR VISIT
[Home] : at home, [unfilled] , at the time of the visit. [Medical Office: ([facility])___] : at the medical office located in

## 2022-07-05 NOTE — PHYSICAL EXAM
[FreeTextEntry1] : There is a no-no head tremor at rest that abates when she turns her head to the left. Right shoulder is elevated. Tends to keep her head bent and does not make much eye contact.When she touches her cheek the head tremor dampens. This also occurs when she leans head against her chair. There is a 1 + right hand rest tremor with involvement of the thumb and index finger. Shirley do not decrement in UE. \par

## 2022-09-23 NOTE — PATIENT PROFILE ADULT. - PATIENT REPRESENTATIVE: ( YOU CAN CHOOSE ANY PERSON THAT CAN ASSIST YOU WITH YOUR HEALTH CARE PREFERENCES, DOES NOT HAVE TO BE A SPOUSE, IMMEDIATE FAMILY OR SIGNIFICANT OTHER/PARTNER)
[FreeTextEntry1] : Travis Sun presents for evaluation of left diminished hearing and discomfort. He has history of recurrent left otitis media and eustachian tube dysfunction. He has had multiple sets of PE tubes placed. Otoscopic exam shows left sided small posteroinferior dry perforation, no tube at this time. There is no effusion. Audiogram was performed and reviewed showing type Ad tymp AD, abnormal eustachian tube function AD, and normal to mild SNHL AD. It showed large ECV AS with normal eustachian tube function, and mild conductive hearing loss AS. We will continue to monitor this perforation and see if ti closes. Nasopharyngoscopy was normal. IF it closes and he has another infection or sensatoin of pressure, can perform repeat testing and consider tube + eustachian tube dilation.\par \par - Follow up in 1 month.\par \par 
Information could not be obtained

## 2022-11-06 ENCOUNTER — OFFICE (OUTPATIENT)
Dept: URBAN - METROPOLITAN AREA CLINIC 90 | Facility: CLINIC | Age: 72
Setting detail: OPHTHALMOLOGY
End: 2022-11-06
Payer: MEDICARE

## 2022-11-06 DIAGNOSIS — H26.493: ICD-10-CM

## 2022-11-06 DIAGNOSIS — H35.3211: ICD-10-CM

## 2022-11-06 DIAGNOSIS — E10.3313: ICD-10-CM

## 2022-11-06 DIAGNOSIS — H16.422: ICD-10-CM

## 2022-11-06 DIAGNOSIS — H35.3122: ICD-10-CM

## 2022-11-06 DIAGNOSIS — H16.223: ICD-10-CM

## 2022-11-06 DIAGNOSIS — H43.391: ICD-10-CM

## 2022-11-06 DIAGNOSIS — H35.373: ICD-10-CM

## 2022-11-06 PROCEDURE — 92004 COMPRE OPH EXAM NEW PT 1/>: CPT | Performed by: OPHTHALMOLOGY

## 2022-11-06 ASSESSMENT — REFRACTION_AUTOREFRACTION
OD_CYLINDER: -2.50
OD_SPHERE: +2.25
OS_CYLINDER: -2.25
OS_AXIS: 072
OS_SPHERE: +2.25
OD_AXIS: 084

## 2022-11-06 ASSESSMENT — SPHEQUIV_DERIVED
OS_SPHEQUIV: 0.25
OS_SPHEQUIV: 1.125
OD_SPHEQUIV: 0.625
OD_SPHEQUIV: 0.5
OD_SPHEQUIV: 1
OS_SPHEQUIV: 1.75
OS_SPHEQUIV: 1.75
OD_SPHEQUIV: 0.625
OS_SPHEQUIV: 0.25
OD_SPHEQUIV: 0.25

## 2022-11-06 ASSESSMENT — LID POSITION - PTOSIS
OS_PTOSIS: LUL 2+ 3+
OD_PTOSIS: RUL 3+

## 2022-11-06 ASSESSMENT — REFRACTION_MANIFEST
OS_AXIS: 075
OS_CYLINDER: -2.00
OS_CYLINDER: -2.50
OD_VA2: 20/50-
OD_VA1: 20/100
OD_VA1: 20/50-2
OD_ADD: +3.50
OD_CYLINDER: -2.00
OS_SPHERE: +0.75
OD_AXIS: 90
OD_ADD: +3.50
OD_CYLINDER: -2.50
OS_AXIS: 080
OS_VA2: 20/50-
OS_AXIS: 080
OD_AXIS: 090
OD_AXIS: 100
OS_VA1: 20/60-
OS_ADD: +3.50
OD_SPHERE: +1.50
OD_CYLINDER: -1.75
OS_ADD: +3.50
OS_SPHERE: +3.00
OS_SPHERE: +0.75
OD_SPHERE: +1.50
OS_CYLINDER: -1.00
OD_SPHERE: +1.50
OS_SPHERE: +2.75
OD_VA1: 20/80
OS_VA1: 20/100-1
OD_SPHERE: +1.50
OS_CYLINDER: -1.00
OD_CYLINDER: -1.75
OS_VA1: 20/100-
OS_AXIS: 080
OD_AXIS: 100

## 2022-11-06 ASSESSMENT — KERATOMETRY
OS_AXISANGLE_DEGREES: 148
OD_K2POWER_DIOPTERS: 46.00
METHOD_AUTO_MANUAL: AUTO
OS_K1POWER_DIOPTERS: 45.75
OS_K2POWER_DIOPTERS: 47.25
OD_AXISANGLE_DEGREES: 168

## 2022-11-06 ASSESSMENT — REFRACTION_CURRENTRX
OS_AXIS: 088
OS_VPRISM_DIRECTION: PROGS
OS_AXIS: 085
OS_OVR_VA: 20/
OS_SPHERE: +2.00
OD_AXIS: 112
OD_AXIS: 095
OD_CYLINDER: -1.00
OD_OVR_VA: 20/
OS_OVR_VA: 20/
OD_SPHERE: +0.50
OD_ADD: +2.25
OD_CYLINDER: -1.00
OD_OVR_VA: 20/
OS_SPHERE: +1.50
OS_ADD: +2.25
OS_ADD: +2.25
OD_ADD: +2.25
OD_SPHERE: +0.50
OS_CYLINDER: -1.75
OD_VPRISM_DIRECTION: PROGS
OS_CYLINDER: -1.75

## 2022-11-06 ASSESSMENT — CORNEAL DYSTROPHY
OS_DYSTROPHY: GUTATTA
OD_DYSTROPHY: GUTATTA

## 2022-11-06 ASSESSMENT — AXIALLENGTH_DERIVED
OS_AL: 21.9336
OS_AL: 22.1461
OS_AL: 22.4506
OS_AL: 22.4506
OS_AL: 21.9336

## 2022-11-06 ASSESSMENT — VISUAL ACUITY
OS_BCVA: 20/150
OD_BCVA: 20/200

## 2022-11-06 ASSESSMENT — VASCULARIZATION: OS_VASCULARIZATION: INFERIOR PANNUS

## 2022-11-06 ASSESSMENT — SUPERFICIAL PUNCTATE KERATITIS (SPK)
OD_SPK: 2+
OS_SPK: 2+

## 2022-11-07 PROBLEM — E11.3413 TYPE 2 DIABETES MELLITUS WITH SEVERE NONPROLIFERATIVE DIABETIC RETINOPATHY WITH MACULAR EDEMA; BOTH EYES: Status: ACTIVE | Noted: 2022-11-06

## 2022-11-28 NOTE — PROVIDER CONTACT NOTE (OTHER) - RECOMMENDATIONS
EKG
Give BP medications early.
Give coreg now, repeat BP in 1.5hrs and give Hydralazine accordingly.
HD tx. will stop. RRT activated. +admit to MICU and will dialyze pt. at micu if needed.
Hold feeding till 6 am   recheck residual at 6am, if < 50 cc restart feeding.  Please, hold feeding 30 mins before AM care. observe pt for s/s of aspiration.
Keep the tube feed at 60cc
OK to hold TF as patient is on continuous bipap. Will re-assess in AM.
administer 20 unit NPH insulin or hold insulin?
hold tube feed til further notice.
place pt on bipap
None

## 2023-01-01 ENCOUNTER — APPOINTMENT (OUTPATIENT)
Dept: NEUROLOGY | Facility: CLINIC | Age: 73
End: 2023-01-01

## 2023-01-01 ENCOUNTER — TRANSCRIPTION ENCOUNTER (OUTPATIENT)
Age: 73
End: 2023-01-01

## 2023-01-01 ENCOUNTER — NON-APPOINTMENT (OUTPATIENT)
Age: 73
End: 2023-01-01

## 2023-01-01 ENCOUNTER — APPOINTMENT (OUTPATIENT)
Dept: CARE COORDINATION | Facility: HOME HEALTH | Age: 73
End: 2023-01-01
Payer: MEDICARE

## 2023-01-01 ENCOUNTER — APPOINTMENT (OUTPATIENT)
Dept: MRI IMAGING | Facility: CLINIC | Age: 73
End: 2023-01-01

## 2023-01-01 ENCOUNTER — INPATIENT (INPATIENT)
Facility: HOSPITAL | Age: 73
LOS: 109 days | End: 2023-11-29
Attending: INTERNAL MEDICINE | Admitting: INTERNAL MEDICINE
Payer: MEDICARE

## 2023-01-01 ENCOUNTER — APPOINTMENT (OUTPATIENT)
Dept: AFTER HOURS CARE | Facility: EMERGENCY ROOM | Age: 73
End: 2023-01-01
Payer: MEDICARE

## 2023-01-01 ENCOUNTER — APPOINTMENT (OUTPATIENT)
Dept: NEUROLOGY | Facility: CLINIC | Age: 73
End: 2023-01-01
Payer: MEDICARE

## 2023-01-01 ENCOUNTER — INPATIENT (INPATIENT)
Facility: HOSPITAL | Age: 73
LOS: 9 days | Discharge: HOME HEALTH | DRG: 177 | End: 2023-07-22
Attending: INTERNAL MEDICINE | Admitting: INTERNAL MEDICINE
Payer: MEDICARE

## 2023-01-01 VITALS
SYSTOLIC BLOOD PRESSURE: 140 MMHG | DIASTOLIC BLOOD PRESSURE: 60 MMHG | OXYGEN SATURATION: 94 % | HEART RATE: 68 BPM | RESPIRATION RATE: 17 BRPM | TEMPERATURE: 98.3 F

## 2023-01-01 VITALS
OXYGEN SATURATION: 94 % | RESPIRATION RATE: 18 BRPM | WEIGHT: 130.07 LBS | SYSTOLIC BLOOD PRESSURE: 163 MMHG | HEART RATE: 69 BPM | HEIGHT: 60 IN | DIASTOLIC BLOOD PRESSURE: 60 MMHG | TEMPERATURE: 98 F

## 2023-01-01 VITALS
DIASTOLIC BLOOD PRESSURE: 68 MMHG | HEART RATE: 65 BPM | RESPIRATION RATE: 18 BRPM | OXYGEN SATURATION: 96 % | SYSTOLIC BLOOD PRESSURE: 165 MMHG | TEMPERATURE: 97 F

## 2023-01-01 VITALS — DIASTOLIC BLOOD PRESSURE: 64 MMHG | SYSTOLIC BLOOD PRESSURE: 157 MMHG | HEART RATE: 61 BPM

## 2023-01-01 DIAGNOSIS — B36.9 SUPERFICIAL MYCOSIS, UNSPECIFIED: ICD-10-CM

## 2023-01-01 DIAGNOSIS — H60.312 DIFFUSE OTITIS EXTERNA, LEFT EAR: ICD-10-CM

## 2023-01-01 DIAGNOSIS — A31.0 PULMONARY MYCOBACTERIAL INFECTION: ICD-10-CM

## 2023-01-01 DIAGNOSIS — I10 ESSENTIAL (PRIMARY) HYPERTENSION: ICD-10-CM

## 2023-01-01 DIAGNOSIS — S90.821A BLISTER (NONTHERMAL), RIGHT FOOT, INITIAL ENCOUNTER: ICD-10-CM

## 2023-01-01 DIAGNOSIS — N18.6 END STAGE RENAL DISEASE: ICD-10-CM

## 2023-01-01 DIAGNOSIS — I63.9 CEREBRAL INFARCTION, UNSPECIFIED: ICD-10-CM

## 2023-01-01 DIAGNOSIS — Z98.890 OTHER SPECIFIED POSTPROCEDURAL STATES: Chronic | ICD-10-CM

## 2023-01-01 DIAGNOSIS — R53.2 FUNCTIONAL QUADRIPLEGIA: ICD-10-CM

## 2023-01-01 DIAGNOSIS — J96.12 CHRONIC RESPIRATORY FAILURE WITH HYPERCAPNIA: ICD-10-CM

## 2023-01-01 DIAGNOSIS — Z71.89 OTHER SPECIFIED COUNSELING: ICD-10-CM

## 2023-01-01 DIAGNOSIS — H70.90 UNSPECIFIED MASTOIDITIS, UNSPECIFIED EAR: ICD-10-CM

## 2023-01-01 DIAGNOSIS — J96.90 RESPIRATORY FAILURE, UNSPECIFIED, UNSPECIFIED WHETHER WITH HYPOXIA OR HYPERCAPNIA: ICD-10-CM

## 2023-01-01 DIAGNOSIS — R06.02 SHORTNESS OF BREATH: ICD-10-CM

## 2023-01-01 DIAGNOSIS — K92.2 GASTROINTESTINAL HEMORRHAGE, UNSPECIFIED: ICD-10-CM

## 2023-01-01 DIAGNOSIS — Z51.5 ENCOUNTER FOR PALLIATIVE CARE: ICD-10-CM

## 2023-01-01 DIAGNOSIS — R60.9 EDEMA, UNSPECIFIED: ICD-10-CM

## 2023-01-01 DIAGNOSIS — E11.9 TYPE 2 DIABETES MELLITUS WITHOUT COMPLICATIONS: ICD-10-CM

## 2023-01-01 DIAGNOSIS — J69.0 PNEUMONITIS DUE TO INHALATION OF FOOD AND VOMIT: ICD-10-CM

## 2023-01-01 DIAGNOSIS — N17.9 ACUTE KIDNEY FAILURE, UNSPECIFIED: ICD-10-CM

## 2023-01-01 DIAGNOSIS — I50.9 HEART FAILURE, UNSPECIFIED: ICD-10-CM

## 2023-01-01 DIAGNOSIS — J96.01 ACUTE RESPIRATORY FAILURE WITH HYPOXIA: ICD-10-CM

## 2023-01-01 DIAGNOSIS — I82.409 ACUTE EMBOLISM AND THROMBOSIS OF UNSPECIFIED DEEP VEINS OF UNSPECIFIED LOWER EXTREMITY: ICD-10-CM

## 2023-01-01 LAB
-  AMIKACIN: SIGNIFICANT CHANGE UP
-  AMOXICILLIN/CLAVULANIC ACID: SIGNIFICANT CHANGE UP
-  AMPICILLIN/SULBACTAM: SIGNIFICANT CHANGE UP
-  AMPICILLIN: SIGNIFICANT CHANGE UP
-  AZTREONAM: SIGNIFICANT CHANGE UP
-  CEFAZOLIN: SIGNIFICANT CHANGE UP
-  CEFEPIME: SIGNIFICANT CHANGE UP
-  CEFOXITIN: SIGNIFICANT CHANGE UP
-  CEFTAZIDIME/AVIBACTAM: SIGNIFICANT CHANGE UP
-  CEFTAZIDIME: SIGNIFICANT CHANGE UP
-  CEFTOLOZANE/TAZOBACTAM: SIGNIFICANT CHANGE UP
-  CEFTRIAXONE: SIGNIFICANT CHANGE UP
-  CIPROFLOXACIN: SIGNIFICANT CHANGE UP
-  CLINDAMYCIN: SIGNIFICANT CHANGE UP
-  COLOSTIN: SIGNIFICANT CHANGE UP
-  ERTAPENEM: SIGNIFICANT CHANGE UP
-  ERYTHROMYCIN: SIGNIFICANT CHANGE UP
-  GENTAMICIN: SIGNIFICANT CHANGE UP
-  IMIPENEM/RELEBACTAM: SIGNIFICANT CHANGE UP
-  IMIPENEM: SIGNIFICANT CHANGE UP
-  LEVOFLOXACIN: SIGNIFICANT CHANGE UP
-  MEROPENEM: SIGNIFICANT CHANGE UP
-  OXACILLIN: SIGNIFICANT CHANGE UP
-  PENICILLIN: SIGNIFICANT CHANGE UP
-  PIPERACILLIN/TAZOBACTAM: SIGNIFICANT CHANGE UP
-  POLYMYXIN B: SIGNIFICANT CHANGE UP
-  RIFAMPIN: SIGNIFICANT CHANGE UP
-  STAPHYLOCOCCUS EPIDERMIDIS, METHICILLIN RESISTANT: SIGNIFICANT CHANGE UP
-  STAPHYLOCOCCUS EPIDERMIDIS, METHICILLIN RESISTANT: SIGNIFICANT CHANGE UP
-  TETRACYCLINE: SIGNIFICANT CHANGE UP
-  TOBRAMYCIN: SIGNIFICANT CHANGE UP
-  TRIMETHOPRIM/SULFAMETHOXAZOLE: SIGNIFICANT CHANGE UP
-  VANCOMYCIN: SIGNIFICANT CHANGE UP
A FLAVUS AB FLD QL: NEGATIVE — SIGNIFICANT CHANGE UP
A FLAVUS AB FLD QL: NEGATIVE — SIGNIFICANT CHANGE UP
A NIGER AB FLD QL: NEGATIVE — SIGNIFICANT CHANGE UP
A1C WITH ESTIMATED AVERAGE GLUCOSE RESULT: 7.1 % — HIGH (ref 4–5.6)
ALBUMIN FLD-MCNC: 1.1 G/DL — SIGNIFICANT CHANGE UP
ALBUMIN FLD-MCNC: 1.1 G/DL — SIGNIFICANT CHANGE UP
ALBUMIN SERPL ELPH-MCNC: 1 G/DL — LOW (ref 3.3–5)
ALBUMIN SERPL ELPH-MCNC: 1 G/DL — LOW (ref 3.3–5)
ALBUMIN SERPL ELPH-MCNC: 1.6 G/DL — LOW (ref 3.3–5)
ALBUMIN SERPL ELPH-MCNC: 1.7 G/DL — LOW (ref 3.3–5)
ALBUMIN SERPL ELPH-MCNC: 1.8 G/DL — LOW (ref 3.3–5)
ALBUMIN SERPL ELPH-MCNC: 1.9 G/DL — LOW (ref 3.3–5)
ALBUMIN SERPL ELPH-MCNC: 2 G/DL — LOW (ref 3.3–5)
ALBUMIN SERPL ELPH-MCNC: 2.1 G/DL — LOW (ref 3.3–5)
ALBUMIN SERPL ELPH-MCNC: 2.2 G/DL — LOW (ref 3.3–5)
ALBUMIN SERPL ELPH-MCNC: 2.3 G/DL — LOW (ref 3.3–5)
ALBUMIN SERPL ELPH-MCNC: 2.4 G/DL — LOW (ref 3.3–5)
ALBUMIN SERPL ELPH-MCNC: 2.5 G/DL — LOW (ref 3.3–5)
ALBUMIN SERPL ELPH-MCNC: 2.6 G/DL — LOW (ref 3.3–5)
ALBUMIN SERPL ELPH-MCNC: 2.7 G/DL — LOW (ref 3.3–5)
ALBUMIN SERPL ELPH-MCNC: 2.8 G/DL — LOW (ref 3.3–5)
ALBUMIN SERPL ELPH-MCNC: 3.1 G/DL — LOW (ref 3.3–5)
ALP SERPL-CCNC: 105 U/L — SIGNIFICANT CHANGE UP (ref 40–120)
ALP SERPL-CCNC: 113 U/L — SIGNIFICANT CHANGE UP (ref 40–120)
ALP SERPL-CCNC: 115 U/L — SIGNIFICANT CHANGE UP (ref 40–120)
ALP SERPL-CCNC: 117 U/L — SIGNIFICANT CHANGE UP (ref 40–120)
ALP SERPL-CCNC: 119 U/L — SIGNIFICANT CHANGE UP (ref 40–120)
ALP SERPL-CCNC: 121 U/L — HIGH (ref 40–120)
ALP SERPL-CCNC: 123 U/L — HIGH (ref 40–120)
ALP SERPL-CCNC: 124 U/L — HIGH (ref 40–120)
ALP SERPL-CCNC: 124 U/L — HIGH (ref 40–120)
ALP SERPL-CCNC: 136 U/L — HIGH (ref 40–120)
ALP SERPL-CCNC: 142 U/L — HIGH (ref 40–120)
ALP SERPL-CCNC: 151 U/L — HIGH (ref 40–120)
ALP SERPL-CCNC: 159 U/L — HIGH (ref 40–120)
ALP SERPL-CCNC: 164 U/L — HIGH (ref 40–120)
ALP SERPL-CCNC: 164 U/L — HIGH (ref 40–120)
ALP SERPL-CCNC: 176 U/L — HIGH (ref 40–120)
ALP SERPL-CCNC: 178 U/L — HIGH (ref 40–120)
ALP SERPL-CCNC: 200 U/L — HIGH (ref 40–120)
ALP SERPL-CCNC: 203 U/L — HIGH (ref 40–120)
ALP SERPL-CCNC: 207 U/L — HIGH (ref 40–120)
ALP SERPL-CCNC: 207 U/L — HIGH (ref 40–120)
ALP SERPL-CCNC: 210 U/L — HIGH (ref 40–120)
ALP SERPL-CCNC: 211 U/L — HIGH (ref 40–120)
ALP SERPL-CCNC: 213 U/L — HIGH (ref 40–120)
ALP SERPL-CCNC: 213 U/L — HIGH (ref 40–120)
ALP SERPL-CCNC: 218 U/L — HIGH (ref 40–120)
ALP SERPL-CCNC: 218 U/L — HIGH (ref 40–120)
ALP SERPL-CCNC: 226 U/L — HIGH (ref 40–120)
ALP SERPL-CCNC: 230 U/L — HIGH (ref 40–120)
ALP SERPL-CCNC: 232 U/L — HIGH (ref 40–120)
ALP SERPL-CCNC: 232 U/L — HIGH (ref 40–120)
ALP SERPL-CCNC: 235 U/L — HIGH (ref 40–120)
ALP SERPL-CCNC: 240 U/L — HIGH (ref 40–120)
ALP SERPL-CCNC: 242 U/L — HIGH (ref 40–120)
ALP SERPL-CCNC: 242 U/L — HIGH (ref 40–120)
ALP SERPL-CCNC: 246 U/L — HIGH (ref 40–120)
ALP SERPL-CCNC: 246 U/L — HIGH (ref 40–120)
ALP SERPL-CCNC: 249 U/L — HIGH (ref 40–120)
ALP SERPL-CCNC: 256 U/L — HIGH (ref 40–120)
ALP SERPL-CCNC: 268 U/L — HIGH (ref 40–120)
ALP SERPL-CCNC: 268 U/L — HIGH (ref 40–120)
ALP SERPL-CCNC: 271 U/L — HIGH (ref 40–120)
ALP SERPL-CCNC: 271 U/L — HIGH (ref 40–120)
ALP SERPL-CCNC: 278 U/L — HIGH (ref 40–120)
ALP SERPL-CCNC: 278 U/L — HIGH (ref 40–120)
ALP SERPL-CCNC: 304 U/L — HIGH (ref 40–120)
ALP SERPL-CCNC: 304 U/L — HIGH (ref 40–120)
ALP SERPL-CCNC: 306 U/L — HIGH (ref 40–120)
ALP SERPL-CCNC: 307 U/L — HIGH (ref 40–120)
ALP SERPL-CCNC: 314 U/L — HIGH (ref 40–120)
ALP SERPL-CCNC: 314 U/L — HIGH (ref 40–120)
ALP SERPL-CCNC: 315 U/L — HIGH (ref 40–120)
ALP SERPL-CCNC: 320 U/L — HIGH (ref 40–120)
ALP SERPL-CCNC: 320 U/L — HIGH (ref 40–120)
ALP SERPL-CCNC: 326 U/L — HIGH (ref 40–120)
ALP SERPL-CCNC: 330 U/L — HIGH (ref 40–120)
ALP SERPL-CCNC: 333 U/L — HIGH (ref 40–120)
ALP SERPL-CCNC: 334 U/L — HIGH (ref 40–120)
ALP SERPL-CCNC: 335 U/L — HIGH (ref 40–120)
ALP SERPL-CCNC: 335 U/L — HIGH (ref 40–120)
ALP SERPL-CCNC: 336 U/L — HIGH (ref 40–120)
ALP SERPL-CCNC: 337 U/L — HIGH (ref 40–120)
ALP SERPL-CCNC: 337 U/L — HIGH (ref 40–120)
ALP SERPL-CCNC: 338 U/L — HIGH (ref 40–120)
ALP SERPL-CCNC: 338 U/L — HIGH (ref 40–120)
ALP SERPL-CCNC: 339 U/L — HIGH (ref 40–120)
ALP SERPL-CCNC: 341 U/L — HIGH (ref 40–120)
ALP SERPL-CCNC: 341 U/L — HIGH (ref 40–120)
ALP SERPL-CCNC: 342 U/L — HIGH (ref 40–120)
ALP SERPL-CCNC: 343 U/L — HIGH (ref 40–120)
ALP SERPL-CCNC: 343 U/L — HIGH (ref 40–120)
ALP SERPL-CCNC: 345 U/L — HIGH (ref 40–120)
ALP SERPL-CCNC: 352 U/L — HIGH (ref 40–120)
ALP SERPL-CCNC: 354 U/L — HIGH (ref 40–120)
ALP SERPL-CCNC: 354 U/L — HIGH (ref 40–120)
ALP SERPL-CCNC: 355 U/L — HIGH (ref 40–120)
ALP SERPL-CCNC: 355 U/L — HIGH (ref 40–120)
ALP SERPL-CCNC: 360 U/L — HIGH (ref 40–120)
ALP SERPL-CCNC: 360 U/L — HIGH (ref 40–120)
ALP SERPL-CCNC: 368 U/L — HIGH (ref 40–120)
ALP SERPL-CCNC: 379 U/L — HIGH (ref 40–120)
ALP SERPL-CCNC: 382 U/L — HIGH (ref 40–120)
ALP SERPL-CCNC: 382 U/L — HIGH (ref 40–120)
ALP SERPL-CCNC: 387 U/L — HIGH (ref 40–120)
ALP SERPL-CCNC: 387 U/L — HIGH (ref 40–120)
ALP SERPL-CCNC: 398 U/L — HIGH (ref 40–120)
ALP SERPL-CCNC: 398 U/L — HIGH (ref 40–120)
ALP SERPL-CCNC: 401 U/L — HIGH (ref 40–120)
ALP SERPL-CCNC: 409 U/L — HIGH (ref 40–120)
ALP SERPL-CCNC: 417 U/L — HIGH (ref 40–120)
ALP SERPL-CCNC: 417 U/L — HIGH (ref 40–120)
ALP SERPL-CCNC: 424 U/L — HIGH (ref 40–120)
ALP SERPL-CCNC: 424 U/L — HIGH (ref 40–120)
ALP SERPL-CCNC: 436 U/L — HIGH (ref 40–120)
ALP SERPL-CCNC: 436 U/L — HIGH (ref 40–120)
ALP SERPL-CCNC: 81 U/L — SIGNIFICANT CHANGE UP (ref 40–120)
ALP SERPL-CCNC: 82 U/L — SIGNIFICANT CHANGE UP (ref 40–120)
ALP SERPL-CCNC: 84 U/L — SIGNIFICANT CHANGE UP (ref 40–120)
ALP SERPL-CCNC: 84 U/L — SIGNIFICANT CHANGE UP (ref 40–120)
ALP SERPL-CCNC: 90 U/L — SIGNIFICANT CHANGE UP (ref 40–120)
ALP SERPL-CCNC: 94 U/L — SIGNIFICANT CHANGE UP (ref 40–120)
ALT FLD-CCNC: 10 U/L — SIGNIFICANT CHANGE UP (ref 4–33)
ALT FLD-CCNC: 11 U/L — SIGNIFICANT CHANGE UP (ref 4–33)
ALT FLD-CCNC: 12 U/L — SIGNIFICANT CHANGE UP (ref 4–33)
ALT FLD-CCNC: 13 U/L — SIGNIFICANT CHANGE UP (ref 4–33)
ALT FLD-CCNC: 14 U/L — SIGNIFICANT CHANGE UP (ref 4–33)
ALT FLD-CCNC: 15 U/L — SIGNIFICANT CHANGE UP (ref 4–33)
ALT FLD-CCNC: 16 U/L — SIGNIFICANT CHANGE UP (ref 4–33)
ALT FLD-CCNC: 17 U/L — SIGNIFICANT CHANGE UP (ref 4–33)
ALT FLD-CCNC: 18 U/L — SIGNIFICANT CHANGE UP (ref 10–45)
ALT FLD-CCNC: 18 U/L — SIGNIFICANT CHANGE UP (ref 4–33)
ALT FLD-CCNC: 19 U/L — SIGNIFICANT CHANGE UP (ref 4–33)
ALT FLD-CCNC: 20 U/L — SIGNIFICANT CHANGE UP (ref 10–45)
ALT FLD-CCNC: 20 U/L — SIGNIFICANT CHANGE UP (ref 4–33)
ALT FLD-CCNC: 21 U/L — SIGNIFICANT CHANGE UP (ref 4–33)
ALT FLD-CCNC: 22 U/L — SIGNIFICANT CHANGE UP (ref 4–33)
ALT FLD-CCNC: 22 U/L — SIGNIFICANT CHANGE UP (ref 4–33)
ALT FLD-CCNC: 24 U/L — SIGNIFICANT CHANGE UP (ref 4–33)
ALT FLD-CCNC: 27 U/L — SIGNIFICANT CHANGE UP (ref 4–33)
ALT FLD-CCNC: 27 U/L — SIGNIFICANT CHANGE UP (ref 4–33)
ALT FLD-CCNC: 28 U/L — SIGNIFICANT CHANGE UP (ref 4–33)
ALT FLD-CCNC: 28 U/L — SIGNIFICANT CHANGE UP (ref 4–33)
ALT FLD-CCNC: 29 U/L — SIGNIFICANT CHANGE UP (ref 4–33)
ALT FLD-CCNC: 30 U/L — SIGNIFICANT CHANGE UP (ref 4–33)
ALT FLD-CCNC: 30 U/L — SIGNIFICANT CHANGE UP (ref 4–33)
ALT FLD-CCNC: 31 U/L — SIGNIFICANT CHANGE UP (ref 4–33)
ALT FLD-CCNC: 35 U/L — HIGH (ref 4–33)
ALT FLD-CCNC: 35 U/L — HIGH (ref 4–33)
ALT FLD-CCNC: 36 U/L — SIGNIFICANT CHANGE UP (ref 10–45)
ALT FLD-CCNC: 39 U/L — HIGH (ref 4–33)
ALT FLD-CCNC: 40 U/L — HIGH (ref 4–33)
ALT FLD-CCNC: 43 U/L — HIGH (ref 4–33)
ALT FLD-CCNC: 5 U/L — SIGNIFICANT CHANGE UP (ref 4–33)
ALT FLD-CCNC: 5 U/L — SIGNIFICANT CHANGE UP (ref 4–33)
ALT FLD-CCNC: 51 U/L — HIGH (ref 4–33)
ALT FLD-CCNC: 7 U/L — SIGNIFICANT CHANGE UP (ref 4–33)
ALT FLD-CCNC: 8 U/L — SIGNIFICANT CHANGE UP (ref 4–33)
ALT FLD-CCNC: 9 U/L — SIGNIFICANT CHANGE UP (ref 4–33)
AMMONIA BLD-MCNC: 14 UMOL/L — SIGNIFICANT CHANGE UP (ref 11–55)
AMMONIA BLD-MCNC: 24 UMOL/L — SIGNIFICANT CHANGE UP (ref 11–55)
AMMONIA BLD-MCNC: 37 UMOL/L — SIGNIFICANT CHANGE UP (ref 11–55)
ANION GAP SERPL CALC-SCNC: 10 MMOL/L — SIGNIFICANT CHANGE UP (ref 5–17)
ANION GAP SERPL CALC-SCNC: 10 MMOL/L — SIGNIFICANT CHANGE UP (ref 7–14)
ANION GAP SERPL CALC-SCNC: 11 MMOL/L — SIGNIFICANT CHANGE UP (ref 5–17)
ANION GAP SERPL CALC-SCNC: 11 MMOL/L — SIGNIFICANT CHANGE UP (ref 7–14)
ANION GAP SERPL CALC-SCNC: 12 MMOL/L — SIGNIFICANT CHANGE UP (ref 5–17)
ANION GAP SERPL CALC-SCNC: 12 MMOL/L — SIGNIFICANT CHANGE UP (ref 7–14)
ANION GAP SERPL CALC-SCNC: 13 MMOL/L — SIGNIFICANT CHANGE UP (ref 5–17)
ANION GAP SERPL CALC-SCNC: 13 MMOL/L — SIGNIFICANT CHANGE UP (ref 7–14)
ANION GAP SERPL CALC-SCNC: 14 MMOL/L — SIGNIFICANT CHANGE UP (ref 7–14)
ANION GAP SERPL CALC-SCNC: 15 MMOL/L — HIGH (ref 7–14)
ANION GAP SERPL CALC-SCNC: 16 MMOL/L — HIGH (ref 7–14)
ANION GAP SERPL CALC-SCNC: 18 MMOL/L — HIGH (ref 7–14)
ANION GAP SERPL CALC-SCNC: 18 MMOL/L — HIGH (ref 7–14)
ANION GAP SERPL CALC-SCNC: 5 MMOL/L — LOW (ref 7–14)
ANION GAP SERPL CALC-SCNC: 6 MMOL/L — LOW (ref 7–14)
ANION GAP SERPL CALC-SCNC: 7 MMOL/L — SIGNIFICANT CHANGE UP (ref 7–14)
ANION GAP SERPL CALC-SCNC: 8 MMOL/L — SIGNIFICANT CHANGE UP (ref 7–14)
ANION GAP SERPL CALC-SCNC: 9 MMOL/L — SIGNIFICANT CHANGE UP (ref 7–14)
ANISOCYTOSIS BLD QL: SLIGHT — SIGNIFICANT CHANGE UP
APPEARANCE UR: ABNORMAL
APPEARANCE UR: CLEAR — SIGNIFICANT CHANGE UP
APTT BLD: 101.3 SEC — HIGH (ref 24.5–35.6)
APTT BLD: 101.3 SEC — HIGH (ref 24.5–35.6)
APTT BLD: 102.4 SEC — HIGH (ref 24.5–35.6)
APTT BLD: 108.6 SEC — HIGH (ref 24.5–35.6)
APTT BLD: 109.6 SEC — HIGH (ref 24.5–35.6)
APTT BLD: 109.6 SEC — HIGH (ref 24.5–35.6)
APTT BLD: 22.9 SEC — LOW (ref 24.5–35.6)
APTT BLD: 23.5 SEC — LOW (ref 24.5–35.6)
APTT BLD: 23.9 SEC — LOW (ref 24.5–35.6)
APTT BLD: 24.9 SEC — SIGNIFICANT CHANGE UP (ref 24.5–35.6)
APTT BLD: 25.3 SEC — SIGNIFICANT CHANGE UP (ref 24.5–35.6)
APTT BLD: 25.5 SEC — SIGNIFICANT CHANGE UP (ref 24.5–35.6)
APTT BLD: 26.6 SEC — SIGNIFICANT CHANGE UP (ref 24.5–35.6)
APTT BLD: 26.7 SEC — SIGNIFICANT CHANGE UP (ref 24.5–35.6)
APTT BLD: 26.7 SEC — SIGNIFICANT CHANGE UP (ref 24.5–35.6)
APTT BLD: 27.6 SEC — SIGNIFICANT CHANGE UP (ref 24.5–35.6)
APTT BLD: 27.6 SEC — SIGNIFICANT CHANGE UP (ref 24.5–35.6)
APTT BLD: 28.3 SEC — SIGNIFICANT CHANGE UP (ref 24.5–35.6)
APTT BLD: 28.8 SEC — SIGNIFICANT CHANGE UP (ref 24.5–35.6)
APTT BLD: 28.9 SEC — SIGNIFICANT CHANGE UP (ref 24.5–35.6)
APTT BLD: 29.3 SEC — SIGNIFICANT CHANGE UP (ref 24.5–35.6)
APTT BLD: 29.7 SEC — SIGNIFICANT CHANGE UP (ref 24.5–35.6)
APTT BLD: 29.9 SEC — SIGNIFICANT CHANGE UP (ref 24.5–35.6)
APTT BLD: 30.3 SEC — SIGNIFICANT CHANGE UP (ref 24.5–35.6)
APTT BLD: 30.3 SEC — SIGNIFICANT CHANGE UP (ref 24.5–35.6)
APTT BLD: 30.5 SEC — SIGNIFICANT CHANGE UP (ref 24.5–35.6)
APTT BLD: 31.6 SEC — SIGNIFICANT CHANGE UP (ref 24.5–35.6)
APTT BLD: 31.6 SEC — SIGNIFICANT CHANGE UP (ref 24.5–35.6)
APTT BLD: 32.1 SEC — SIGNIFICANT CHANGE UP (ref 24.5–35.6)
APTT BLD: 32.2 SEC — SIGNIFICANT CHANGE UP (ref 24.5–35.6)
APTT BLD: 32.2 SEC — SIGNIFICANT CHANGE UP (ref 24.5–35.6)
APTT BLD: 32.9 SEC — SIGNIFICANT CHANGE UP (ref 24.5–35.6)
APTT BLD: 33 SEC — SIGNIFICANT CHANGE UP (ref 24.5–35.6)
APTT BLD: 33.1 SEC — SIGNIFICANT CHANGE UP (ref 24.5–35.6)
APTT BLD: 33.1 SEC — SIGNIFICANT CHANGE UP (ref 24.5–35.6)
APTT BLD: 33.4 SEC — SIGNIFICANT CHANGE UP (ref 24.5–35.6)
APTT BLD: 33.7 SEC — SIGNIFICANT CHANGE UP (ref 24.5–35.6)
APTT BLD: 33.7 SEC — SIGNIFICANT CHANGE UP (ref 24.5–35.6)
APTT BLD: 33.8 SEC — SIGNIFICANT CHANGE UP (ref 24.5–35.6)
APTT BLD: 33.8 SEC — SIGNIFICANT CHANGE UP (ref 24.5–35.6)
APTT BLD: 34.2 SEC — SIGNIFICANT CHANGE UP (ref 24.5–35.6)
APTT BLD: 34.2 SEC — SIGNIFICANT CHANGE UP (ref 24.5–35.6)
APTT BLD: 34.6 SEC — SIGNIFICANT CHANGE UP (ref 24.5–35.6)
APTT BLD: 34.6 SEC — SIGNIFICANT CHANGE UP (ref 24.5–35.6)
APTT BLD: 34.7 SEC — SIGNIFICANT CHANGE UP (ref 24.5–35.6)
APTT BLD: 34.7 SEC — SIGNIFICANT CHANGE UP (ref 24.5–35.6)
APTT BLD: 35.3 SEC — SIGNIFICANT CHANGE UP (ref 24.5–35.6)
APTT BLD: 36.2 SEC — HIGH (ref 24.5–35.6)
APTT BLD: 36.2 SEC — HIGH (ref 24.5–35.6)
APTT BLD: 38.7 SEC — HIGH (ref 24.5–35.6)
APTT BLD: 38.7 SEC — HIGH (ref 24.5–35.6)
APTT BLD: 43.3 SEC — HIGH (ref 24.5–35.6)
APTT BLD: 48.7 SEC — HIGH (ref 24.5–35.6)
APTT BLD: 50.5 SEC — HIGH (ref 24.5–35.6)
APTT BLD: 55.1 SEC — HIGH (ref 24.5–35.6)
APTT BLD: 56.3 SEC — HIGH (ref 24.5–35.6)
APTT BLD: 56.9 SEC — HIGH (ref 24.5–35.6)
APTT BLD: 57.1 SEC — HIGH (ref 24.5–35.6)
APTT BLD: 59.2 SEC — HIGH (ref 24.5–35.6)
APTT BLD: 62.3 SEC — HIGH (ref 24.5–35.6)
APTT BLD: 63.1 SEC — HIGH (ref 24.5–35.6)
APTT BLD: 63.5 SEC — HIGH (ref 24.5–35.6)
APTT BLD: 64.8 SEC — HIGH (ref 24.5–35.6)
APTT BLD: 65.4 SEC — HIGH (ref 24.5–35.6)
APTT BLD: 67.9 SEC — HIGH (ref 24.5–35.6)
APTT BLD: 71.8 SEC — HIGH (ref 24.5–35.6)
APTT BLD: 73.3 SEC — HIGH (ref 24.5–35.6)
APTT BLD: 73.4 SEC — HIGH (ref 24.5–35.6)
APTT BLD: 73.4 SEC — HIGH (ref 24.5–35.6)
APTT BLD: 76.3 SEC — HIGH (ref 24.5–35.6)
APTT BLD: 79.6 SEC — HIGH (ref 24.5–35.6)
APTT BLD: 81.1 SEC — HIGH (ref 24.5–35.6)
APTT BLD: 82.1 SEC — HIGH (ref 24.5–35.6)
APTT BLD: 83.7 SEC — HIGH (ref 24.5–35.6)
APTT BLD: 84.1 SEC — HIGH (ref 24.5–35.6)
APTT BLD: 84.1 SEC — HIGH (ref 24.5–35.6)
APTT BLD: 85.2 SEC — HIGH (ref 24.5–35.6)
APTT BLD: 85.2 SEC — HIGH (ref 24.5–35.6)
APTT BLD: 85.9 SEC — HIGH (ref 24.5–35.6)
APTT BLD: 99.5 SEC — HIGH (ref 24.5–35.6)
APTT BLD: >200 SEC — SIGNIFICANT CHANGE UP (ref 24.5–35.6)
AST SERPL-CCNC: 10 U/L — SIGNIFICANT CHANGE UP (ref 4–32)
AST SERPL-CCNC: 11 U/L — SIGNIFICANT CHANGE UP (ref 4–32)
AST SERPL-CCNC: 11 U/L — SIGNIFICANT CHANGE UP (ref 4–32)
AST SERPL-CCNC: 12 U/L — SIGNIFICANT CHANGE UP (ref 4–32)
AST SERPL-CCNC: 13 U/L — SIGNIFICANT CHANGE UP (ref 4–32)
AST SERPL-CCNC: 13 U/L — SIGNIFICANT CHANGE UP (ref 4–32)
AST SERPL-CCNC: 14 U/L — SIGNIFICANT CHANGE UP (ref 4–32)
AST SERPL-CCNC: 15 U/L — SIGNIFICANT CHANGE UP (ref 4–32)
AST SERPL-CCNC: 16 U/L — SIGNIFICANT CHANGE UP (ref 4–32)
AST SERPL-CCNC: 17 U/L — SIGNIFICANT CHANGE UP (ref 4–32)
AST SERPL-CCNC: 18 U/L — SIGNIFICANT CHANGE UP (ref 10–40)
AST SERPL-CCNC: 18 U/L — SIGNIFICANT CHANGE UP (ref 4–32)
AST SERPL-CCNC: 20 U/L — SIGNIFICANT CHANGE UP (ref 4–32)
AST SERPL-CCNC: 21 U/L — SIGNIFICANT CHANGE UP (ref 4–32)
AST SERPL-CCNC: 22 U/L — SIGNIFICANT CHANGE UP (ref 4–32)
AST SERPL-CCNC: 23 U/L — SIGNIFICANT CHANGE UP (ref 4–32)
AST SERPL-CCNC: 24 U/L — SIGNIFICANT CHANGE UP (ref 4–32)
AST SERPL-CCNC: 24 U/L — SIGNIFICANT CHANGE UP (ref 4–32)
AST SERPL-CCNC: 25 U/L — SIGNIFICANT CHANGE UP (ref 4–32)
AST SERPL-CCNC: 26 U/L — SIGNIFICANT CHANGE UP (ref 4–32)
AST SERPL-CCNC: 27 U/L — SIGNIFICANT CHANGE UP (ref 10–40)
AST SERPL-CCNC: 27 U/L — SIGNIFICANT CHANGE UP (ref 4–32)
AST SERPL-CCNC: 27 U/L — SIGNIFICANT CHANGE UP (ref 4–32)
AST SERPL-CCNC: 28 U/L — SIGNIFICANT CHANGE UP (ref 10–40)
AST SERPL-CCNC: 28 U/L — SIGNIFICANT CHANGE UP (ref 4–32)
AST SERPL-CCNC: 29 U/L — SIGNIFICANT CHANGE UP (ref 4–32)
AST SERPL-CCNC: 30 U/L — SIGNIFICANT CHANGE UP (ref 4–32)
AST SERPL-CCNC: 31 U/L — SIGNIFICANT CHANGE UP (ref 4–32)
AST SERPL-CCNC: 33 U/L — HIGH (ref 4–32)
AST SERPL-CCNC: 36 U/L — HIGH (ref 4–32)
AST SERPL-CCNC: 37 U/L — HIGH (ref 4–32)
AST SERPL-CCNC: 37 U/L — HIGH (ref 4–32)
AST SERPL-CCNC: 38 U/L — HIGH (ref 4–32)
AST SERPL-CCNC: 40 U/L — HIGH (ref 4–32)
AST SERPL-CCNC: 41 U/L — HIGH (ref 4–32)
AST SERPL-CCNC: 42 U/L — HIGH (ref 4–32)
AST SERPL-CCNC: 42 U/L — HIGH (ref 4–32)
AST SERPL-CCNC: 43 U/L — HIGH (ref 4–32)
AST SERPL-CCNC: 45 U/L — HIGH (ref 4–32)
AST SERPL-CCNC: 45 U/L — HIGH (ref 4–32)
AST SERPL-CCNC: 46 U/L — HIGH (ref 4–32)
AST SERPL-CCNC: 48 U/L — HIGH (ref 4–32)
AST SERPL-CCNC: 49 U/L — HIGH (ref 4–32)
AST SERPL-CCNC: 49 U/L — HIGH (ref 4–32)
AST SERPL-CCNC: 52 U/L — HIGH (ref 4–32)
AST SERPL-CCNC: 53 U/L — HIGH (ref 4–32)
AST SERPL-CCNC: 57 U/L — HIGH (ref 4–32)
AST SERPL-CCNC: 59 U/L — HIGH (ref 4–32)
AST SERPL-CCNC: 67 U/L — HIGH (ref 4–32)
AST SERPL-CCNC: 70 U/L — HIGH (ref 4–32)
AST SERPL-CCNC: 9 U/L — SIGNIFICANT CHANGE UP (ref 4–32)
AST SERPL-CCNC: 9 U/L — SIGNIFICANT CHANGE UP (ref 4–32)
AST SERPL-CCNC: <5 U/L — SIGNIFICANT CHANGE UP (ref 4–32)
AST SERPL-CCNC: <5 U/L — SIGNIFICANT CHANGE UP (ref 4–32)
AUTO DIFF PNL BLD: NEGATIVE — SIGNIFICANT CHANGE UP
AUTO DIFF PNL BLD: NEGATIVE — SIGNIFICANT CHANGE UP
B PERT DNA SPEC QL NAA+PROBE: SIGNIFICANT CHANGE UP
B PERT IGG+IGM PNL SER: ABNORMAL
B PERT+PARAPERT DNA PNL SPEC NAA+PROBE: SIGNIFICANT CHANGE UP
BACTERIA # UR AUTO: ABNORMAL /HPF
BACTERIA # UR AUTO: NEGATIVE /HPF — SIGNIFICANT CHANGE UP
BACTERIA # UR AUTO: NEGATIVE — SIGNIFICANT CHANGE UP
BASE EXCESS BLDA CALC-SCNC: -0.2 MMOL/L — SIGNIFICANT CHANGE UP (ref -2–3)
BASE EXCESS BLDA CALC-SCNC: -0.3 MMOL/L — SIGNIFICANT CHANGE UP (ref -2–3)
BASE EXCESS BLDA CALC-SCNC: -0.3 MMOL/L — SIGNIFICANT CHANGE UP (ref -2–3)
BASE EXCESS BLDA CALC-SCNC: -2.9 MMOL/L — LOW (ref -2–3)
BASE EXCESS BLDA CALC-SCNC: -2.9 MMOL/L — LOW (ref -2–3)
BASE EXCESS BLDA CALC-SCNC: -3 MMOL/L — LOW (ref -2–3)
BASE EXCESS BLDA CALC-SCNC: -5.8 MMOL/L — LOW (ref -2–3)
BASE EXCESS BLDA CALC-SCNC: -8 MMOL/L — LOW (ref -2–3)
BASE EXCESS BLDA CALC-SCNC: -8 MMOL/L — LOW (ref -2–3)
BASE EXCESS BLDA CALC-SCNC: 0.5 MMOL/L — SIGNIFICANT CHANGE UP (ref -2–3)
BASE EXCESS BLDA CALC-SCNC: 4.9 MMOL/L — HIGH (ref -2–3)
BASE EXCESS BLDV CALC-SCNC: -0.9 MMOL/L — SIGNIFICANT CHANGE UP (ref -2–3)
BASE EXCESS BLDV CALC-SCNC: -0.9 MMOL/L — SIGNIFICANT CHANGE UP (ref -2–3)
BASE EXCESS BLDV CALC-SCNC: -1.3 MMOL/L — SIGNIFICANT CHANGE UP (ref -2–3)
BASE EXCESS BLDV CALC-SCNC: -1.4 MMOL/L — SIGNIFICANT CHANGE UP (ref -2–3)
BASE EXCESS BLDV CALC-SCNC: -1.5 MMOL/L — SIGNIFICANT CHANGE UP (ref -2–3)
BASE EXCESS BLDV CALC-SCNC: -3 MMOL/L — LOW (ref -2–3)
BASE EXCESS BLDV CALC-SCNC: -5.3 MMOL/L — LOW (ref -2–3)
BASE EXCESS BLDV CALC-SCNC: -5.4 MMOL/L — LOW (ref -2–3)
BASE EXCESS BLDV CALC-SCNC: 0.1 MMOL/L — SIGNIFICANT CHANGE UP (ref -2–3)
BASE EXCESS BLDV CALC-SCNC: 0.1 MMOL/L — SIGNIFICANT CHANGE UP (ref -2–3)
BASE EXCESS BLDV CALC-SCNC: 1.2 MMOL/L — SIGNIFICANT CHANGE UP (ref -2–3)
BASE EXCESS BLDV CALC-SCNC: 1.6 MMOL/L — SIGNIFICANT CHANGE UP (ref -2–3)
BASE EXCESS BLDV CALC-SCNC: 1.6 MMOL/L — SIGNIFICANT CHANGE UP (ref -2–3)
BASE EXCESS BLDV CALC-SCNC: 1.8 MMOL/L — SIGNIFICANT CHANGE UP (ref -2–3)
BASE EXCESS BLDV CALC-SCNC: 2.2 MMOL/L — SIGNIFICANT CHANGE UP (ref -2–3)
BASE EXCESS BLDV CALC-SCNC: 2.2 MMOL/L — SIGNIFICANT CHANGE UP (ref -2–3)
BASE EXCESS BLDV CALC-SCNC: 2.4 MMOL/L — SIGNIFICANT CHANGE UP (ref -2–3)
BASE EXCESS BLDV CALC-SCNC: 2.5 MMOL/L — SIGNIFICANT CHANGE UP (ref -2–3)
BASE EXCESS BLDV CALC-SCNC: 2.7 MMOL/L — SIGNIFICANT CHANGE UP (ref -2–3)
BASE EXCESS BLDV CALC-SCNC: 2.9 MMOL/L — SIGNIFICANT CHANGE UP (ref -2–3)
BASE EXCESS BLDV CALC-SCNC: 2.9 MMOL/L — SIGNIFICANT CHANGE UP (ref -2–3)
BASE EXCESS BLDV CALC-SCNC: 3.1 MMOL/L — HIGH (ref -2–3)
BASE EXCESS BLDV CALC-SCNC: 3.1 MMOL/L — HIGH (ref -2–3)
BASE EXCESS BLDV CALC-SCNC: 3.2 MMOL/L — HIGH (ref -2–3)
BASE EXCESS BLDV CALC-SCNC: 3.5 MMOL/L — HIGH (ref -2–3)
BASE EXCESS BLDV CALC-SCNC: 5.4 MMOL/L — HIGH (ref -2–3)
BASE EXCESS BLDV CALC-SCNC: 5.4 MMOL/L — HIGH (ref -2–3)
BASE EXCESS BLDV CALC-SCNC: 5.6 MMOL/L — HIGH (ref -2–3)
BASOPHILS # BLD AUTO: 0 K/UL — SIGNIFICANT CHANGE UP (ref 0–0.2)
BASOPHILS # BLD AUTO: 0.01 K/UL — SIGNIFICANT CHANGE UP (ref 0–0.2)
BASOPHILS # BLD AUTO: 0.02 K/UL — SIGNIFICANT CHANGE UP (ref 0–0.2)
BASOPHILS # BLD AUTO: 0.03 K/UL — SIGNIFICANT CHANGE UP (ref 0–0.2)
BASOPHILS # BLD AUTO: 0.04 K/UL — SIGNIFICANT CHANGE UP (ref 0–0.2)
BASOPHILS # BLD AUTO: 0.05 K/UL — SIGNIFICANT CHANGE UP (ref 0–0.2)
BASOPHILS # BLD AUTO: 0.06 K/UL — SIGNIFICANT CHANGE UP (ref 0–0.2)
BASOPHILS # BLD AUTO: 0.07 K/UL — SIGNIFICANT CHANGE UP (ref 0–0.2)
BASOPHILS # BLD AUTO: 0.08 K/UL — SIGNIFICANT CHANGE UP (ref 0–0.2)
BASOPHILS # BLD AUTO: 0.09 K/UL — SIGNIFICANT CHANGE UP (ref 0–0.2)
BASOPHILS # BLD AUTO: 0.1 K/UL — SIGNIFICANT CHANGE UP (ref 0–0.2)
BASOPHILS # BLD AUTO: 0.11 K/UL — SIGNIFICANT CHANGE UP (ref 0–0.2)
BASOPHILS # BLD AUTO: 0.12 K/UL — SIGNIFICANT CHANGE UP (ref 0–0.2)
BASOPHILS # BLD AUTO: 0.13 K/UL — SIGNIFICANT CHANGE UP (ref 0–0.2)
BASOPHILS # BLD AUTO: 0.14 K/UL — SIGNIFICANT CHANGE UP (ref 0–0.2)
BASOPHILS # BLD AUTO: 0.16 K/UL — SIGNIFICANT CHANGE UP (ref 0–0.2)
BASOPHILS # BLD AUTO: 0.16 K/UL — SIGNIFICANT CHANGE UP (ref 0–0.2)
BASOPHILS # BLD AUTO: 0.17 K/UL — SIGNIFICANT CHANGE UP (ref 0–0.2)
BASOPHILS # BLD AUTO: 0.18 K/UL — SIGNIFICANT CHANGE UP (ref 0–0.2)
BASOPHILS # BLD AUTO: 0.18 K/UL — SIGNIFICANT CHANGE UP (ref 0–0.2)
BASOPHILS # BLD AUTO: 0.19 K/UL — SIGNIFICANT CHANGE UP (ref 0–0.2)
BASOPHILS # BLD AUTO: 0.19 K/UL — SIGNIFICANT CHANGE UP (ref 0–0.2)
BASOPHILS # BLD AUTO: 0.2 K/UL — SIGNIFICANT CHANGE UP (ref 0–0.2)
BASOPHILS # BLD AUTO: 0.2 K/UL — SIGNIFICANT CHANGE UP (ref 0–0.2)
BASOPHILS # BLD AUTO: 0.23 K/UL — HIGH (ref 0–0.2)
BASOPHILS # BLD AUTO: 0.23 K/UL — HIGH (ref 0–0.2)
BASOPHILS NFR BLD AUTO: 0 % — SIGNIFICANT CHANGE UP (ref 0–2)
BASOPHILS NFR BLD AUTO: 0.1 % — SIGNIFICANT CHANGE UP (ref 0–2)
BASOPHILS NFR BLD AUTO: 0.2 % — SIGNIFICANT CHANGE UP (ref 0–2)
BASOPHILS NFR BLD AUTO: 0.3 % — SIGNIFICANT CHANGE UP (ref 0–2)
BASOPHILS NFR BLD AUTO: 0.4 % — SIGNIFICANT CHANGE UP (ref 0–2)
BASOPHILS NFR BLD AUTO: 0.5 % — SIGNIFICANT CHANGE UP (ref 0–2)
BASOPHILS NFR BLD AUTO: 0.6 % — SIGNIFICANT CHANGE UP (ref 0–2)
BASOPHILS NFR BLD AUTO: 0.7 % — SIGNIFICANT CHANGE UP (ref 0–2)
BASOPHILS NFR BLD AUTO: 0.8 % — SIGNIFICANT CHANGE UP (ref 0–2)
BASOPHILS NFR BLD AUTO: 0.9 % — SIGNIFICANT CHANGE UP (ref 0–2)
BASOPHILS NFR BLD AUTO: 1 % — SIGNIFICANT CHANGE UP (ref 0–2)
BASOPHILS NFR BLD AUTO: 1.4 % — SIGNIFICANT CHANGE UP (ref 0–2)
BASOPHILS NFR BLD AUTO: 1.4 % — SIGNIFICANT CHANGE UP (ref 0–2)
BILIRUB DIRECT SERPL-MCNC: <0.2 MG/DL — SIGNIFICANT CHANGE UP (ref 0–0.3)
BILIRUB INDIRECT FLD-MCNC: >0 MG/DL — SIGNIFICANT CHANGE UP (ref 0–1)
BILIRUB INDIRECT FLD-MCNC: >0.1 MG/DL — SIGNIFICANT CHANGE UP (ref 0–1)
BILIRUB INDIRECT FLD-MCNC: >0.2 MG/DL — SIGNIFICANT CHANGE UP (ref 0–1)
BILIRUB SERPL-MCNC: 0.2 MG/DL — SIGNIFICANT CHANGE UP (ref 0.2–1.2)
BILIRUB SERPL-MCNC: 0.3 MG/DL — SIGNIFICANT CHANGE UP (ref 0.2–1.2)
BILIRUB SERPL-MCNC: 0.4 MG/DL — SIGNIFICANT CHANGE UP (ref 0.2–1.2)
BILIRUB SERPL-MCNC: <0.2 MG/DL — SIGNIFICANT CHANGE UP (ref 0.2–1.2)
BILIRUB UR-MCNC: NEGATIVE — SIGNIFICANT CHANGE UP
BLANDM BLD POS QL PROBE: SIGNIFICANT CHANGE UP
BLD GP AB SCN SERPL QL: NEGATIVE — SIGNIFICANT CHANGE UP
BLOOD GAS ARTERIAL - LYTES,HGB,ICA,LACT RESULT: SIGNIFICANT CHANGE UP
BLOOD GAS ARTERIAL COMPREHENSIVE RESULT: SIGNIFICANT CHANGE UP
BLOOD GAS VENOUS COMPREHENSIVE RESULT: SIGNIFICANT CHANGE UP
BORDETELLA PARAPERTUSSIS (RAPRVP): SIGNIFICANT CHANGE UP
BUN SERPL-MCNC: 100 MG/DL — HIGH (ref 7–23)
BUN SERPL-MCNC: 102 MG/DL — HIGH (ref 7–23)
BUN SERPL-MCNC: 103 MG/DL — HIGH (ref 7–23)
BUN SERPL-MCNC: 104 MG/DL — HIGH (ref 7–23)
BUN SERPL-MCNC: 107 MG/DL — HIGH (ref 7–23)
BUN SERPL-MCNC: 108 MG/DL — HIGH (ref 7–23)
BUN SERPL-MCNC: 109 MG/DL — HIGH (ref 7–23)
BUN SERPL-MCNC: 110 MG/DL — HIGH (ref 7–23)
BUN SERPL-MCNC: 21 MG/DL — SIGNIFICANT CHANGE UP (ref 7–23)
BUN SERPL-MCNC: 22 MG/DL — SIGNIFICANT CHANGE UP (ref 7–23)
BUN SERPL-MCNC: 24 MG/DL — HIGH (ref 7–23)
BUN SERPL-MCNC: 25 MG/DL — HIGH (ref 7–23)
BUN SERPL-MCNC: 26 MG/DL — HIGH (ref 7–23)
BUN SERPL-MCNC: 27 MG/DL — HIGH (ref 7–23)
BUN SERPL-MCNC: 28 MG/DL — HIGH (ref 7–23)
BUN SERPL-MCNC: 29 MG/DL — HIGH (ref 7–23)
BUN SERPL-MCNC: 30 MG/DL — HIGH (ref 7–23)
BUN SERPL-MCNC: 31 MG/DL — HIGH (ref 7–23)
BUN SERPL-MCNC: 32 MG/DL — HIGH (ref 7–23)
BUN SERPL-MCNC: 33 MG/DL — HIGH (ref 7–23)
BUN SERPL-MCNC: 33 MG/DL — HIGH (ref 7–23)
BUN SERPL-MCNC: 34 MG/DL — HIGH (ref 7–23)
BUN SERPL-MCNC: 34 MG/DL — HIGH (ref 7–23)
BUN SERPL-MCNC: 35 MG/DL — HIGH (ref 7–23)
BUN SERPL-MCNC: 36 MG/DL — HIGH (ref 7–23)
BUN SERPL-MCNC: 37 MG/DL — HIGH (ref 7–23)
BUN SERPL-MCNC: 38 MG/DL — HIGH (ref 7–23)
BUN SERPL-MCNC: 39 MG/DL — HIGH (ref 7–23)
BUN SERPL-MCNC: 40 MG/DL — HIGH (ref 7–23)
BUN SERPL-MCNC: 41 MG/DL — HIGH (ref 7–23)
BUN SERPL-MCNC: 41 MG/DL — HIGH (ref 7–23)
BUN SERPL-MCNC: 42 MG/DL — HIGH (ref 7–23)
BUN SERPL-MCNC: 42 MG/DL — HIGH (ref 7–23)
BUN SERPL-MCNC: 43 MG/DL — HIGH (ref 7–23)
BUN SERPL-MCNC: 44 MG/DL — HIGH (ref 7–23)
BUN SERPL-MCNC: 45 MG/DL — HIGH (ref 7–23)
BUN SERPL-MCNC: 45 MG/DL — HIGH (ref 7–23)
BUN SERPL-MCNC: 46 MG/DL — HIGH (ref 7–23)
BUN SERPL-MCNC: 47 MG/DL — HIGH (ref 7–23)
BUN SERPL-MCNC: 47 MG/DL — HIGH (ref 7–23)
BUN SERPL-MCNC: 48 MG/DL — HIGH (ref 7–23)
BUN SERPL-MCNC: 49 MG/DL — HIGH (ref 7–23)
BUN SERPL-MCNC: 49 MG/DL — HIGH (ref 7–23)
BUN SERPL-MCNC: 51 MG/DL — HIGH (ref 7–23)
BUN SERPL-MCNC: 53 MG/DL — HIGH (ref 7–23)
BUN SERPL-MCNC: 54 MG/DL — HIGH (ref 7–23)
BUN SERPL-MCNC: 55 MG/DL — HIGH (ref 7–23)
BUN SERPL-MCNC: 56 MG/DL — HIGH (ref 7–23)
BUN SERPL-MCNC: 57 MG/DL — HIGH (ref 7–23)
BUN SERPL-MCNC: 59 MG/DL — HIGH (ref 7–23)
BUN SERPL-MCNC: 60 MG/DL — HIGH (ref 7–23)
BUN SERPL-MCNC: 60 MG/DL — HIGH (ref 7–23)
BUN SERPL-MCNC: 62 MG/DL — HIGH (ref 7–23)
BUN SERPL-MCNC: 63 MG/DL — HIGH (ref 7–23)
BUN SERPL-MCNC: 64 MG/DL — HIGH (ref 7–23)
BUN SERPL-MCNC: 64 MG/DL — HIGH (ref 7–23)
BUN SERPL-MCNC: 65 MG/DL — HIGH (ref 7–23)
BUN SERPL-MCNC: 67 MG/DL — HIGH (ref 7–23)
BUN SERPL-MCNC: 69 MG/DL — HIGH (ref 7–23)
BUN SERPL-MCNC: 71 MG/DL — HIGH (ref 7–23)
BUN SERPL-MCNC: 74 MG/DL — HIGH (ref 7–23)
BUN SERPL-MCNC: 75 MG/DL — HIGH (ref 7–23)
BUN SERPL-MCNC: 75 MG/DL — HIGH (ref 7–23)
BUN SERPL-MCNC: 78 MG/DL — HIGH (ref 7–23)
BUN SERPL-MCNC: 79 MG/DL — HIGH (ref 7–23)
BUN SERPL-MCNC: 80 MG/DL — HIGH (ref 7–23)
BUN SERPL-MCNC: 82 MG/DL — HIGH (ref 7–23)
BUN SERPL-MCNC: 82 MG/DL — HIGH (ref 7–23)
BUN SERPL-MCNC: 84 MG/DL — HIGH (ref 7–23)
BUN SERPL-MCNC: 87 MG/DL — HIGH (ref 7–23)
BUN SERPL-MCNC: 87 MG/DL — HIGH (ref 7–23)
BUN SERPL-MCNC: 88 MG/DL — HIGH (ref 7–23)
BUN SERPL-MCNC: 88 MG/DL — HIGH (ref 7–23)
BUN SERPL-MCNC: 90 MG/DL — HIGH (ref 7–23)
BUN SERPL-MCNC: 90 MG/DL — HIGH (ref 7–23)
BUN SERPL-MCNC: 92 MG/DL — HIGH (ref 7–23)
BUN SERPL-MCNC: 92 MG/DL — HIGH (ref 7–23)
BUN SERPL-MCNC: 93 MG/DL — HIGH (ref 7–23)
BUN SERPL-MCNC: 96 MG/DL — HIGH (ref 7–23)
BUN SERPL-MCNC: 96 MG/DL — HIGH (ref 7–23)
BUN SERPL-MCNC: 97 MG/DL — HIGH (ref 7–23)
BUN SERPL-MCNC: 97 MG/DL — HIGH (ref 7–23)
BUN SERPL-MCNC: 98 MG/DL — HIGH (ref 7–23)
BUN SERPL-MCNC: 99 MG/DL — HIGH (ref 7–23)
C DIFF BY PCR RESULT: SIGNIFICANT CHANGE UP
C DIFF BY PCR RESULT: SIGNIFICANT CHANGE UP
C PNEUM DNA SPEC QL NAA+PROBE: SIGNIFICANT CHANGE UP
C-ANCA SER-ACNC: NEGATIVE — SIGNIFICANT CHANGE UP
C-ANCA SER-ACNC: NEGATIVE — SIGNIFICANT CHANGE UP
CA-I BLD-SCNC: 1.28 MMOL/L — SIGNIFICANT CHANGE UP (ref 1.15–1.29)
CA-I BLD-SCNC: 1.37 MMOL/L — HIGH (ref 1.15–1.29)
CA-I BLD-SCNC: 1.45 MMOL/L — HIGH (ref 1.15–1.29)
CA-I BLD-SCNC: 1.48 MMOL/L — HIGH (ref 1.15–1.29)
CA-I SERPL-SCNC: 1.19 MMOL/L — SIGNIFICANT CHANGE UP (ref 1.15–1.33)
CA-I SERPL-SCNC: 1.22 MMOL/L — SIGNIFICANT CHANGE UP (ref 1.15–1.33)
CA-I SERPL-SCNC: 1.22 MMOL/L — SIGNIFICANT CHANGE UP (ref 1.15–1.33)
CA-I SERPL-SCNC: 1.25 MMOL/L — SIGNIFICANT CHANGE UP (ref 1.15–1.33)
CA-I SERPL-SCNC: 1.25 MMOL/L — SIGNIFICANT CHANGE UP (ref 1.15–1.33)
CA-I SERPL-SCNC: 1.27 MMOL/L — SIGNIFICANT CHANGE UP (ref 1.15–1.33)
CA-I SERPL-SCNC: 1.28 MMOL/L — SIGNIFICANT CHANGE UP (ref 1.15–1.33)
CA-I SERPL-SCNC: 1.3 MMOL/L — SIGNIFICANT CHANGE UP (ref 1.15–1.33)
CA-I SERPL-SCNC: 1.34 MMOL/L — HIGH (ref 1.15–1.33)
CA-I SERPL-SCNC: 1.34 MMOL/L — HIGH (ref 1.15–1.33)
CA-I SERPL-SCNC: 1.35 MMOL/L — HIGH (ref 1.15–1.33)
CALCIUM SERPL-MCNC: 10.2 MG/DL — SIGNIFICANT CHANGE UP (ref 8.4–10.5)
CALCIUM SERPL-MCNC: 10.5 MG/DL — SIGNIFICANT CHANGE UP (ref 8.4–10.5)
CALCIUM SERPL-MCNC: 10.9 MG/DL — HIGH (ref 8.4–10.5)
CALCIUM SERPL-MCNC: 6.4 MG/DL — CRITICAL LOW (ref 8.4–10.5)
CALCIUM SERPL-MCNC: 6.4 MG/DL — CRITICAL LOW (ref 8.4–10.5)
CALCIUM SERPL-MCNC: 6.7 MG/DL — LOW (ref 8.4–10.5)
CALCIUM SERPL-MCNC: 7 MG/DL — LOW (ref 8.4–10.5)
CALCIUM SERPL-MCNC: 7.5 MG/DL — LOW (ref 8.4–10.5)
CALCIUM SERPL-MCNC: 7.5 MG/DL — LOW (ref 8.4–10.5)
CALCIUM SERPL-MCNC: 7.6 MG/DL — LOW (ref 8.4–10.5)
CALCIUM SERPL-MCNC: 7.6 MG/DL — LOW (ref 8.4–10.5)
CALCIUM SERPL-MCNC: 7.7 MG/DL — LOW (ref 8.4–10.5)
CALCIUM SERPL-MCNC: 7.8 MG/DL — LOW (ref 8.4–10.5)
CALCIUM SERPL-MCNC: 7.8 MG/DL — LOW (ref 8.4–10.5)
CALCIUM SERPL-MCNC: 7.9 MG/DL — LOW (ref 8.4–10.5)
CALCIUM SERPL-MCNC: 8 MG/DL — LOW (ref 8.4–10.5)
CALCIUM SERPL-MCNC: 8.1 MG/DL — LOW (ref 8.4–10.5)
CALCIUM SERPL-MCNC: 8.2 MG/DL — LOW (ref 8.4–10.5)
CALCIUM SERPL-MCNC: 8.3 MG/DL — LOW (ref 8.4–10.5)
CALCIUM SERPL-MCNC: 8.4 MG/DL — SIGNIFICANT CHANGE UP (ref 8.4–10.5)
CALCIUM SERPL-MCNC: 8.5 MG/DL — SIGNIFICANT CHANGE UP (ref 8.4–10.5)
CALCIUM SERPL-MCNC: 8.6 MG/DL — SIGNIFICANT CHANGE UP (ref 8.4–10.5)
CALCIUM SERPL-MCNC: 8.7 MG/DL — SIGNIFICANT CHANGE UP (ref 8.4–10.5)
CALCIUM SERPL-MCNC: 8.8 MG/DL — SIGNIFICANT CHANGE UP (ref 8.4–10.5)
CALCIUM SERPL-MCNC: 8.9 MG/DL — SIGNIFICANT CHANGE UP (ref 8.4–10.5)
CALCIUM SERPL-MCNC: 9 MG/DL — SIGNIFICANT CHANGE UP (ref 8.4–10.5)
CALCIUM SERPL-MCNC: 9 MG/DL — SIGNIFICANT CHANGE UP (ref 8.4–10.5)
CALCIUM SERPL-MCNC: 9.1 MG/DL — SIGNIFICANT CHANGE UP (ref 8.4–10.5)
CALCIUM SERPL-MCNC: 9.2 MG/DL — SIGNIFICANT CHANGE UP (ref 8.4–10.5)
CALCIUM SERPL-MCNC: 9.3 MG/DL — SIGNIFICANT CHANGE UP (ref 8.4–10.5)
CALCIUM SERPL-MCNC: 9.4 MG/DL — SIGNIFICANT CHANGE UP (ref 8.4–10.5)
CALCIUM SERPL-MCNC: 9.4 MG/DL — SIGNIFICANT CHANGE UP (ref 8.4–10.5)
CALCIUM SERPL-MCNC: 9.5 MG/DL — SIGNIFICANT CHANGE UP (ref 8.4–10.5)
CALCIUM SERPL-MCNC: 9.6 MG/DL — SIGNIFICANT CHANGE UP (ref 8.4–10.5)
CALCIUM SERPL-MCNC: 9.6 MG/DL — SIGNIFICANT CHANGE UP (ref 8.4–10.5)
CAST: 1 /LPF — SIGNIFICANT CHANGE UP (ref 0–4)
CAST: 11 /LPF — HIGH (ref 0–4)
CAST: 13 /LPF — HIGH (ref 0–4)
CAST: 2 /LPF — SIGNIFICANT CHANGE UP (ref 0–4)
CAST: 2 /LPF — SIGNIFICANT CHANGE UP (ref 0–4)
CAST: 6 /LPF — HIGH (ref 0–4)
CHLORIDE BLDV-SCNC: 101 MMOL/L — SIGNIFICANT CHANGE UP (ref 96–108)
CHLORIDE BLDV-SCNC: 102 MMOL/L — SIGNIFICANT CHANGE UP (ref 96–108)
CHLORIDE BLDV-SCNC: 103 MMOL/L — SIGNIFICANT CHANGE UP (ref 96–108)
CHLORIDE BLDV-SCNC: 104 MMOL/L — SIGNIFICANT CHANGE UP (ref 96–108)
CHLORIDE BLDV-SCNC: 104 MMOL/L — SIGNIFICANT CHANGE UP (ref 96–108)
CHLORIDE BLDV-SCNC: 105 MMOL/L — SIGNIFICANT CHANGE UP (ref 96–108)
CHLORIDE BLDV-SCNC: 105 MMOL/L — SIGNIFICANT CHANGE UP (ref 96–108)
CHLORIDE BLDV-SCNC: 106 MMOL/L — SIGNIFICANT CHANGE UP (ref 96–108)
CHLORIDE BLDV-SCNC: 94 MMOL/L — LOW (ref 96–108)
CHLORIDE BLDV-SCNC: 94 MMOL/L — LOW (ref 96–108)
CHLORIDE BLDV-SCNC: 95 MMOL/L — LOW (ref 96–108)
CHLORIDE BLDV-SCNC: 96 MMOL/L — SIGNIFICANT CHANGE UP (ref 96–108)
CHLORIDE BLDV-SCNC: 97 MMOL/L — SIGNIFICANT CHANGE UP (ref 96–108)
CHLORIDE BLDV-SCNC: 98 MMOL/L — SIGNIFICANT CHANGE UP (ref 96–108)
CHLORIDE BLDV-SCNC: 98 MMOL/L — SIGNIFICANT CHANGE UP (ref 96–108)
CHLORIDE SERPL-SCNC: 100 MMOL/L — SIGNIFICANT CHANGE UP (ref 96–108)
CHLORIDE SERPL-SCNC: 100 MMOL/L — SIGNIFICANT CHANGE UP (ref 98–107)
CHLORIDE SERPL-SCNC: 101 MMOL/L — SIGNIFICANT CHANGE UP (ref 98–107)
CHLORIDE SERPL-SCNC: 102 MMOL/L — SIGNIFICANT CHANGE UP (ref 96–108)
CHLORIDE SERPL-SCNC: 102 MMOL/L — SIGNIFICANT CHANGE UP (ref 96–108)
CHLORIDE SERPL-SCNC: 102 MMOL/L — SIGNIFICANT CHANGE UP (ref 98–107)
CHLORIDE SERPL-SCNC: 103 MMOL/L — SIGNIFICANT CHANGE UP (ref 96–108)
CHLORIDE SERPL-SCNC: 103 MMOL/L — SIGNIFICANT CHANGE UP (ref 98–107)
CHLORIDE SERPL-SCNC: 104 MMOL/L — SIGNIFICANT CHANGE UP (ref 96–108)
CHLORIDE SERPL-SCNC: 108 MMOL/L — HIGH (ref 98–107)
CHLORIDE SERPL-SCNC: 109 MMOL/L — HIGH (ref 98–107)
CHLORIDE SERPL-SCNC: 109 MMOL/L — HIGH (ref 98–107)
CHLORIDE SERPL-SCNC: 87 MMOL/L — LOW (ref 98–107)
CHLORIDE SERPL-SCNC: 88 MMOL/L — LOW (ref 98–107)
CHLORIDE SERPL-SCNC: 89 MMOL/L — LOW (ref 98–107)
CHLORIDE SERPL-SCNC: 89 MMOL/L — LOW (ref 98–107)
CHLORIDE SERPL-SCNC: 90 MMOL/L — LOW (ref 98–107)
CHLORIDE SERPL-SCNC: 90 MMOL/L — LOW (ref 98–107)
CHLORIDE SERPL-SCNC: 91 MMOL/L — LOW (ref 98–107)
CHLORIDE SERPL-SCNC: 92 MMOL/L — LOW (ref 98–107)
CHLORIDE SERPL-SCNC: 93 MMOL/L — LOW (ref 98–107)
CHLORIDE SERPL-SCNC: 94 MMOL/L — LOW (ref 98–107)
CHLORIDE SERPL-SCNC: 95 MMOL/L — LOW (ref 98–107)
CHLORIDE SERPL-SCNC: 96 MMOL/L — LOW (ref 98–107)
CHLORIDE SERPL-SCNC: 97 MMOL/L — LOW (ref 98–107)
CHLORIDE SERPL-SCNC: 98 MMOL/L — SIGNIFICANT CHANGE UP (ref 98–107)
CHLORIDE SERPL-SCNC: 99 MMOL/L — SIGNIFICANT CHANGE UP (ref 96–108)
CHLORIDE SERPL-SCNC: 99 MMOL/L — SIGNIFICANT CHANGE UP (ref 98–107)
CHLORIDE UR-SCNC: 26 MMOL/L — SIGNIFICANT CHANGE UP
CHOLEST SERPL-MCNC: 63 MG/DL — SIGNIFICANT CHANGE UP
CK SERPL-CCNC: 46 U/L — SIGNIFICANT CHANGE UP (ref 25–170)
CO2 BLDA-SCNC: 20 MMOL/L — SIGNIFICANT CHANGE UP (ref 19–24)
CO2 BLDA-SCNC: 26 MMOL/L — HIGH (ref 19–24)
CO2 BLDA-SCNC: 28 MMOL/L — HIGH (ref 19–24)
CO2 BLDA-SCNC: 29 MMOL/L — HIGH (ref 19–24)
CO2 BLDA-SCNC: 29 MMOL/L — HIGH (ref 19–24)
CO2 BLDA-SCNC: 30 MMOL/L — HIGH (ref 19–24)
CO2 BLDA-SCNC: 30 MMOL/L — HIGH (ref 19–24)
CO2 BLDA-SCNC: 31 MMOL/L — HIGH (ref 19–24)
CO2 BLDA-SCNC: 33 MMOL/L — HIGH (ref 19–24)
CO2 BLDV-SCNC: 24.6 MMOL/L — SIGNIFICANT CHANGE UP (ref 22–26)
CO2 BLDV-SCNC: 24.6 MMOL/L — SIGNIFICANT CHANGE UP (ref 22–26)
CO2 BLDV-SCNC: 25.3 MMOL/L — SIGNIFICANT CHANGE UP (ref 22–26)
CO2 BLDV-SCNC: 25.8 MMOL/L — SIGNIFICANT CHANGE UP (ref 22–26)
CO2 BLDV-SCNC: 26.2 MMOL/L — HIGH (ref 22–26)
CO2 BLDV-SCNC: 26.2 MMOL/L — HIGH (ref 22–26)
CO2 BLDV-SCNC: 26.5 MMOL/L — HIGH (ref 22–26)
CO2 BLDV-SCNC: 27.9 MMOL/L — HIGH (ref 22–26)
CO2 BLDV-SCNC: 27.9 MMOL/L — HIGH (ref 22–26)
CO2 BLDV-SCNC: 28 MMOL/L — HIGH (ref 22–26)
CO2 BLDV-SCNC: 28.6 MMOL/L — HIGH (ref 22–26)
CO2 BLDV-SCNC: 28.8 MMOL/L — HIGH (ref 22–26)
CO2 BLDV-SCNC: 29.3 MMOL/L — HIGH (ref 22–26)
CO2 BLDV-SCNC: 29.7 MMOL/L — HIGH (ref 22–26)
CO2 BLDV-SCNC: 29.9 MMOL/L — HIGH (ref 22–26)
CO2 BLDV-SCNC: 30 MMOL/L — HIGH (ref 22–26)
CO2 BLDV-SCNC: 30.2 MMOL/L — HIGH (ref 22–26)
CO2 BLDV-SCNC: 30.2 MMOL/L — HIGH (ref 22–26)
CO2 BLDV-SCNC: 30.4 MMOL/L — HIGH (ref 22–26)
CO2 BLDV-SCNC: 31 MMOL/L — HIGH (ref 22–26)
CO2 BLDV-SCNC: 31.3 MMOL/L — HIGH (ref 22–26)
CO2 BLDV-SCNC: 31.4 MMOL/L — HIGH (ref 22–26)
CO2 BLDV-SCNC: 32 MMOL/L — HIGH (ref 22–26)
CO2 BLDV-SCNC: 32.5 MMOL/L — HIGH (ref 22–26)
CO2 BLDV-SCNC: 40.3 MMOL/L — HIGH (ref 22–26)
CO2 SERPL-SCNC: 18 MMOL/L — LOW (ref 22–31)
CO2 SERPL-SCNC: 19 MMOL/L — LOW (ref 22–31)
CO2 SERPL-SCNC: 19 MMOL/L — LOW (ref 22–31)
CO2 SERPL-SCNC: 20 MMOL/L — LOW (ref 22–31)
CO2 SERPL-SCNC: 21 MMOL/L — LOW (ref 22–31)
CO2 SERPL-SCNC: 21 MMOL/L — LOW (ref 22–31)
CO2 SERPL-SCNC: 22 MMOL/L — SIGNIFICANT CHANGE UP (ref 22–31)
CO2 SERPL-SCNC: 23 MMOL/L — SIGNIFICANT CHANGE UP (ref 22–31)
CO2 SERPL-SCNC: 24 MMOL/L — SIGNIFICANT CHANGE UP (ref 22–31)
CO2 SERPL-SCNC: 25 MMOL/L — SIGNIFICANT CHANGE UP (ref 22–31)
CO2 SERPL-SCNC: 26 MMOL/L — SIGNIFICANT CHANGE UP (ref 22–31)
CO2 SERPL-SCNC: 27 MMOL/L — SIGNIFICANT CHANGE UP (ref 22–31)
CO2 SERPL-SCNC: 28 MMOL/L — SIGNIFICANT CHANGE UP (ref 22–31)
CO2 SERPL-SCNC: 29 MMOL/L — SIGNIFICANT CHANGE UP (ref 22–31)
CO2 SERPL-SCNC: 30 MMOL/L — SIGNIFICANT CHANGE UP (ref 22–31)
CO2 SERPL-SCNC: 32 MMOL/L — HIGH (ref 22–31)
COLOR FLD: ABNORMAL
COLOR SPEC: SIGNIFICANT CHANGE UP
COLOR SPEC: YELLOW — SIGNIFICANT CHANGE UP
COMMENT - FLUIDS: SIGNIFICANT CHANGE UP
CORTIS AM PEAK SERPL-MCNC: 11.2 UG/DL — SIGNIFICANT CHANGE UP (ref 6–18.4)
CORTIS AM PEAK SERPL-MCNC: 11.2 UG/DL — SIGNIFICANT CHANGE UP (ref 6–18.4)
CORTIS AM PEAK SERPL-MCNC: 15.1 UG/DL — SIGNIFICANT CHANGE UP (ref 6–18.4)
CORTIS F PM SERPL-MCNC: 22 UG/DL — HIGH (ref 2.7–10.5)
CREAT ?TM UR-MCNC: 108 MG/DL — SIGNIFICANT CHANGE UP
CREAT SERPL-MCNC: 0.97 MG/DL — SIGNIFICANT CHANGE UP (ref 0.5–1.3)
CREAT SERPL-MCNC: 0.97 MG/DL — SIGNIFICANT CHANGE UP (ref 0.5–1.3)
CREAT SERPL-MCNC: 0.99 MG/DL — SIGNIFICANT CHANGE UP (ref 0.5–1.3)
CREAT SERPL-MCNC: 0.99 MG/DL — SIGNIFICANT CHANGE UP (ref 0.5–1.3)
CREAT SERPL-MCNC: 1.02 MG/DL — SIGNIFICANT CHANGE UP (ref 0.5–1.3)
CREAT SERPL-MCNC: 1.02 MG/DL — SIGNIFICANT CHANGE UP (ref 0.5–1.3)
CREAT SERPL-MCNC: 1.1 MG/DL — SIGNIFICANT CHANGE UP (ref 0.5–1.3)
CREAT SERPL-MCNC: 1.12 MG/DL — SIGNIFICANT CHANGE UP (ref 0.5–1.3)
CREAT SERPL-MCNC: 1.12 MG/DL — SIGNIFICANT CHANGE UP (ref 0.5–1.3)
CREAT SERPL-MCNC: 1.18 MG/DL — SIGNIFICANT CHANGE UP (ref 0.5–1.3)
CREAT SERPL-MCNC: 1.19 MG/DL — SIGNIFICANT CHANGE UP (ref 0.5–1.3)
CREAT SERPL-MCNC: 1.2 MG/DL — SIGNIFICANT CHANGE UP (ref 0.5–1.3)
CREAT SERPL-MCNC: 1.2 MG/DL — SIGNIFICANT CHANGE UP (ref 0.5–1.3)
CREAT SERPL-MCNC: 1.23 MG/DL — SIGNIFICANT CHANGE UP (ref 0.5–1.3)
CREAT SERPL-MCNC: 1.23 MG/DL — SIGNIFICANT CHANGE UP (ref 0.5–1.3)
CREAT SERPL-MCNC: 1.24 MG/DL — SIGNIFICANT CHANGE UP (ref 0.5–1.3)
CREAT SERPL-MCNC: 1.28 MG/DL — SIGNIFICANT CHANGE UP (ref 0.5–1.3)
CREAT SERPL-MCNC: 1.28 MG/DL — SIGNIFICANT CHANGE UP (ref 0.5–1.3)
CREAT SERPL-MCNC: 1.29 MG/DL — SIGNIFICANT CHANGE UP (ref 0.5–1.3)
CREAT SERPL-MCNC: 1.31 MG/DL — HIGH (ref 0.5–1.3)
CREAT SERPL-MCNC: 1.33 MG/DL — HIGH (ref 0.5–1.3)
CREAT SERPL-MCNC: 1.35 MG/DL — HIGH (ref 0.5–1.3)
CREAT SERPL-MCNC: 1.39 MG/DL — HIGH (ref 0.5–1.3)
CREAT SERPL-MCNC: 1.39 MG/DL — HIGH (ref 0.5–1.3)
CREAT SERPL-MCNC: 1.41 MG/DL — HIGH (ref 0.5–1.3)
CREAT SERPL-MCNC: 1.42 MG/DL — HIGH (ref 0.5–1.3)
CREAT SERPL-MCNC: 1.42 MG/DL — HIGH (ref 0.5–1.3)
CREAT SERPL-MCNC: 1.46 MG/DL — HIGH (ref 0.5–1.3)
CREAT SERPL-MCNC: 1.47 MG/DL — HIGH (ref 0.5–1.3)
CREAT SERPL-MCNC: 1.48 MG/DL — HIGH (ref 0.5–1.3)
CREAT SERPL-MCNC: 1.49 MG/DL — HIGH (ref 0.5–1.3)
CREAT SERPL-MCNC: 1.49 MG/DL — HIGH (ref 0.5–1.3)
CREAT SERPL-MCNC: 1.51 MG/DL — HIGH (ref 0.5–1.3)
CREAT SERPL-MCNC: 1.53 MG/DL — HIGH (ref 0.5–1.3)
CREAT SERPL-MCNC: 1.53 MG/DL — HIGH (ref 0.5–1.3)
CREAT SERPL-MCNC: 1.54 MG/DL — HIGH (ref 0.5–1.3)
CREAT SERPL-MCNC: 1.54 MG/DL — HIGH (ref 0.5–1.3)
CREAT SERPL-MCNC: 1.55 MG/DL — HIGH (ref 0.5–1.3)
CREAT SERPL-MCNC: 1.55 MG/DL — HIGH (ref 0.5–1.3)
CREAT SERPL-MCNC: 1.57 MG/DL — HIGH (ref 0.5–1.3)
CREAT SERPL-MCNC: 1.59 MG/DL — HIGH (ref 0.5–1.3)
CREAT SERPL-MCNC: 1.59 MG/DL — HIGH (ref 0.5–1.3)
CREAT SERPL-MCNC: 1.62 MG/DL — HIGH (ref 0.5–1.3)
CREAT SERPL-MCNC: 1.62 MG/DL — HIGH (ref 0.5–1.3)
CREAT SERPL-MCNC: 1.63 MG/DL — HIGH (ref 0.5–1.3)
CREAT SERPL-MCNC: 1.63 MG/DL — HIGH (ref 0.5–1.3)
CREAT SERPL-MCNC: 1.64 MG/DL — HIGH (ref 0.5–1.3)
CREAT SERPL-MCNC: 1.64 MG/DL — HIGH (ref 0.5–1.3)
CREAT SERPL-MCNC: 1.65 MG/DL — HIGH (ref 0.5–1.3)
CREAT SERPL-MCNC: 1.65 MG/DL — HIGH (ref 0.5–1.3)
CREAT SERPL-MCNC: 1.67 MG/DL — HIGH (ref 0.5–1.3)
CREAT SERPL-MCNC: 1.67 MG/DL — HIGH (ref 0.5–1.3)
CREAT SERPL-MCNC: 1.68 MG/DL — HIGH (ref 0.5–1.3)
CREAT SERPL-MCNC: 1.69 MG/DL — HIGH (ref 0.5–1.3)
CREAT SERPL-MCNC: 1.71 MG/DL — HIGH (ref 0.5–1.3)
CREAT SERPL-MCNC: 1.72 MG/DL — HIGH (ref 0.5–1.3)
CREAT SERPL-MCNC: 1.72 MG/DL — HIGH (ref 0.5–1.3)
CREAT SERPL-MCNC: 1.73 MG/DL — HIGH (ref 0.5–1.3)
CREAT SERPL-MCNC: 1.75 MG/DL — HIGH (ref 0.5–1.3)
CREAT SERPL-MCNC: 1.76 MG/DL — HIGH (ref 0.5–1.3)
CREAT SERPL-MCNC: 1.76 MG/DL — HIGH (ref 0.5–1.3)
CREAT SERPL-MCNC: 1.77 MG/DL — HIGH (ref 0.5–1.3)
CREAT SERPL-MCNC: 1.77 MG/DL — HIGH (ref 0.5–1.3)
CREAT SERPL-MCNC: 1.78 MG/DL — HIGH (ref 0.5–1.3)
CREAT SERPL-MCNC: 1.79 MG/DL — HIGH (ref 0.5–1.3)
CREAT SERPL-MCNC: 1.79 MG/DL — HIGH (ref 0.5–1.3)
CREAT SERPL-MCNC: 1.89 MG/DL — HIGH (ref 0.5–1.3)
CREAT SERPL-MCNC: 1.93 MG/DL — HIGH (ref 0.5–1.3)
CREAT SERPL-MCNC: 1.94 MG/DL — HIGH (ref 0.5–1.3)
CREAT SERPL-MCNC: 1.95 MG/DL — HIGH (ref 0.5–1.3)
CREAT SERPL-MCNC: 1.95 MG/DL — HIGH (ref 0.5–1.3)
CREAT SERPL-MCNC: 1.97 MG/DL — HIGH (ref 0.5–1.3)
CREAT SERPL-MCNC: 1.98 MG/DL — HIGH (ref 0.5–1.3)
CREAT SERPL-MCNC: 1.99 MG/DL — HIGH (ref 0.5–1.3)
CREAT SERPL-MCNC: 2.04 MG/DL — HIGH (ref 0.5–1.3)
CREAT SERPL-MCNC: 2.04 MG/DL — HIGH (ref 0.5–1.3)
CREAT SERPL-MCNC: 2.06 MG/DL — HIGH (ref 0.5–1.3)
CREAT SERPL-MCNC: 2.06 MG/DL — HIGH (ref 0.5–1.3)
CREAT SERPL-MCNC: 2.07 MG/DL — HIGH (ref 0.5–1.3)
CREAT SERPL-MCNC: 2.14 MG/DL — HIGH (ref 0.5–1.3)
CREAT SERPL-MCNC: 2.19 MG/DL — HIGH (ref 0.5–1.3)
CREAT SERPL-MCNC: 2.21 MG/DL — HIGH (ref 0.5–1.3)
CREAT SERPL-MCNC: 2.21 MG/DL — HIGH (ref 0.5–1.3)
CREAT SERPL-MCNC: 2.24 MG/DL — HIGH (ref 0.5–1.3)
CREAT SERPL-MCNC: 2.24 MG/DL — HIGH (ref 0.5–1.3)
CREAT SERPL-MCNC: 2.25 MG/DL — HIGH (ref 0.5–1.3)
CREAT SERPL-MCNC: 2.28 MG/DL — HIGH (ref 0.5–1.3)
CREAT SERPL-MCNC: 2.29 MG/DL — HIGH (ref 0.5–1.3)
CREAT SERPL-MCNC: 2.29 MG/DL — HIGH (ref 0.5–1.3)
CREAT SERPL-MCNC: 2.34 MG/DL — HIGH (ref 0.5–1.3)
CREAT SERPL-MCNC: 2.36 MG/DL — HIGH (ref 0.5–1.3)
CREAT SERPL-MCNC: 2.38 MG/DL — HIGH (ref 0.5–1.3)
CREAT SERPL-MCNC: 2.4 MG/DL — HIGH (ref 0.5–1.3)
CREAT SERPL-MCNC: 2.42 MG/DL — HIGH (ref 0.5–1.3)
CREAT SERPL-MCNC: 2.44 MG/DL — HIGH (ref 0.5–1.3)
CREAT SERPL-MCNC: 2.52 MG/DL — HIGH (ref 0.5–1.3)
CREAT SERPL-MCNC: 2.52 MG/DL — HIGH (ref 0.5–1.3)
CREAT SERPL-MCNC: 2.54 MG/DL — HIGH (ref 0.5–1.3)
CREAT SERPL-MCNC: 2.55 MG/DL — HIGH (ref 0.5–1.3)
CREAT SERPL-MCNC: 2.55 MG/DL — HIGH (ref 0.5–1.3)
CREAT SERPL-MCNC: 2.56 MG/DL — HIGH (ref 0.5–1.3)
CREAT SERPL-MCNC: 2.59 MG/DL — HIGH (ref 0.5–1.3)
CREAT SERPL-MCNC: 2.59 MG/DL — HIGH (ref 0.5–1.3)
CREAT SERPL-MCNC: 2.61 MG/DL — HIGH (ref 0.5–1.3)
CREAT SERPL-MCNC: 2.62 MG/DL — HIGH (ref 0.5–1.3)
CREAT SERPL-MCNC: 2.63 MG/DL — HIGH (ref 0.5–1.3)
CREAT SERPL-MCNC: 2.64 MG/DL — HIGH (ref 0.5–1.3)
CREAT SERPL-MCNC: 2.66 MG/DL — HIGH (ref 0.5–1.3)
CREAT SERPL-MCNC: 2.67 MG/DL — HIGH (ref 0.5–1.3)
CREAT SERPL-MCNC: 2.68 MG/DL — HIGH (ref 0.5–1.3)
CREAT SERPL-MCNC: 2.71 MG/DL — HIGH (ref 0.5–1.3)
CREAT SERPL-MCNC: 2.74 MG/DL — HIGH (ref 0.5–1.3)
CREAT SERPL-MCNC: 2.74 MG/DL — HIGH (ref 0.5–1.3)
CREAT SERPL-MCNC: 2.77 MG/DL — HIGH (ref 0.5–1.3)
CREAT SERPL-MCNC: 2.82 MG/DL — HIGH (ref 0.5–1.3)
CREAT SERPL-MCNC: 2.82 MG/DL — HIGH (ref 0.5–1.3)
CREAT SERPL-MCNC: 2.87 MG/DL — HIGH (ref 0.5–1.3)
CREAT SERPL-MCNC: 2.88 MG/DL — HIGH (ref 0.5–1.3)
CREAT SERPL-MCNC: 2.92 MG/DL — HIGH (ref 0.5–1.3)
CREAT SERPL-MCNC: 2.97 MG/DL — HIGH (ref 0.5–1.3)
CREAT SERPL-MCNC: 2.99 MG/DL — HIGH (ref 0.5–1.3)
CREAT SERPL-MCNC: 3.08 MG/DL — HIGH (ref 0.5–1.3)
CREAT SERPL-MCNC: 3.09 MG/DL — HIGH (ref 0.5–1.3)
CREAT SERPL-MCNC: 3.19 MG/DL — HIGH (ref 0.5–1.3)
CREAT SERPL-MCNC: 3.29 MG/DL — HIGH (ref 0.5–1.3)
CREAT SERPL-MCNC: 3.33 MG/DL — HIGH (ref 0.5–1.3)
CREAT SERPL-MCNC: 3.5 MG/DL — HIGH (ref 0.5–1.3)
CREAT SERPL-MCNC: 3.64 MG/DL — HIGH (ref 0.5–1.3)
CREAT SERPL-MCNC: 3.72 MG/DL — HIGH (ref 0.5–1.3)
CREAT SERPL-MCNC: 3.79 MG/DL — HIGH (ref 0.5–1.3)
CREAT SERPL-MCNC: 4.07 MG/DL — HIGH (ref 0.5–1.3)
CREAT SERPL-MCNC: 4.12 MG/DL — HIGH (ref 0.5–1.3)
CREAT SERPL-MCNC: 4.35 MG/DL — HIGH (ref 0.5–1.3)
CREAT SERPL-MCNC: 4.63 MG/DL — HIGH (ref 0.5–1.3)
CREAT SERPL-MCNC: 4.73 MG/DL — HIGH (ref 0.5–1.3)
CREAT SERPL-MCNC: 4.8 MG/DL — HIGH (ref 0.5–1.3)
CULTURE RESULTS: ABNORMAL
CULTURE RESULTS: NO GROWTH — SIGNIFICANT CHANGE UP
CULTURE RESULTS: SIGNIFICANT CHANGE UP
DIALYSIS INSTRUMENT RESULT - HEPATITIS B SURFACE ANTIGEN: NEGATIVE — SIGNIFICANT CHANGE UP
DIFF PNL FLD: ABNORMAL
EGFR: 10 ML/MIN/1.73M2 — LOW
EGFR: 11 ML/MIN/1.73M2 — LOW
EGFR: 11 ML/MIN/1.73M2 — LOW
EGFR: 12 ML/MIN/1.73M2 — LOW
EGFR: 13 ML/MIN/1.73M2 — LOW
EGFR: 14 ML/MIN/1.73M2 — LOW
EGFR: 14 ML/MIN/1.73M2 — LOW
EGFR: 15 ML/MIN/1.73M2 — LOW
EGFR: 16 ML/MIN/1.73M2 — LOW
EGFR: 17 ML/MIN/1.73M2 — LOW
EGFR: 18 ML/MIN/1.73M2 — LOW
EGFR: 19 ML/MIN/1.73M2 — LOW
EGFR: 20 ML/MIN/1.73M2 — LOW
EGFR: 20 ML/MIN/1.73M2 — LOW
EGFR: 21 ML/MIN/1.73M2 — LOW
EGFR: 22 ML/MIN/1.73M2 — LOW
EGFR: 23 ML/MIN/1.73M2 — LOW
EGFR: 24 ML/MIN/1.73M2 — LOW
EGFR: 25 ML/MIN/1.73M2 — LOW
EGFR: 26 ML/MIN/1.73M2 — LOW
EGFR: 27 ML/MIN/1.73M2 — LOW
EGFR: 29 ML/MIN/1.73M2 — LOW
EGFR: 30 ML/MIN/1.73M2 — LOW
EGFR: 31 ML/MIN/1.73M2 — LOW
EGFR: 32 ML/MIN/1.73M2 — LOW
EGFR: 33 ML/MIN/1.73M2 — LOW
EGFR: 34 ML/MIN/1.73M2 — LOW
EGFR: 35 ML/MIN/1.73M2 — LOW
EGFR: 36 ML/MIN/1.73M2 — LOW
EGFR: 37 ML/MIN/1.73M2 — LOW
EGFR: 39 ML/MIN/1.73M2 — LOW
EGFR: 40 ML/MIN/1.73M2 — LOW
EGFR: 40 ML/MIN/1.73M2 — LOW
EGFR: 41 ML/MIN/1.73M2 — LOW
EGFR: 42 ML/MIN/1.73M2 — LOW
EGFR: 43 ML/MIN/1.73M2 — LOW
EGFR: 44 ML/MIN/1.73M2 — LOW
EGFR: 44 ML/MIN/1.73M2 — LOW
EGFR: 45 ML/MIN/1.73M2 — LOW
EGFR: 46 ML/MIN/1.73M2 — LOW
EGFR: 46 ML/MIN/1.73M2 — LOW
EGFR: 47 ML/MIN/1.73M2 — LOW
EGFR: 47 ML/MIN/1.73M2 — LOW
EGFR: 48 ML/MIN/1.73M2 — LOW
EGFR: 51 ML/MIN/1.73M2 — LOW
EGFR: 51 ML/MIN/1.73M2 — LOW
EGFR: 52 ML/MIN/1.73M2 — LOW
EGFR: 57 ML/MIN/1.73M2 — LOW
EGFR: 57 ML/MIN/1.73M2 — LOW
EGFR: 59 ML/MIN/1.73M2 — LOW
EGFR: 59 ML/MIN/1.73M2 — LOW
EGFR: 61 ML/MIN/1.73M2 — SIGNIFICANT CHANGE UP
EGFR: 61 ML/MIN/1.73M2 — SIGNIFICANT CHANGE UP
EGFR: 9 ML/MIN/1.73M2 — LOW
EOSINOPHIL # BLD AUTO: 0 K/UL — SIGNIFICANT CHANGE UP (ref 0–0.5)
EOSINOPHIL # BLD AUTO: 0.01 K/UL — SIGNIFICANT CHANGE UP (ref 0–0.5)
EOSINOPHIL # BLD AUTO: 0.01 K/UL — SIGNIFICANT CHANGE UP (ref 0–0.5)
EOSINOPHIL # BLD AUTO: 0.02 K/UL — SIGNIFICANT CHANGE UP (ref 0–0.5)
EOSINOPHIL # BLD AUTO: 0.03 K/UL — SIGNIFICANT CHANGE UP (ref 0–0.5)
EOSINOPHIL # BLD AUTO: 0.05 K/UL — SIGNIFICANT CHANGE UP (ref 0–0.5)
EOSINOPHIL # BLD AUTO: 0.06 K/UL — SIGNIFICANT CHANGE UP (ref 0–0.5)
EOSINOPHIL # BLD AUTO: 0.08 K/UL — SIGNIFICANT CHANGE UP (ref 0–0.5)
EOSINOPHIL # BLD AUTO: 0.08 K/UL — SIGNIFICANT CHANGE UP (ref 0–0.5)
EOSINOPHIL # BLD AUTO: 0.1 K/UL — SIGNIFICANT CHANGE UP (ref 0–0.5)
EOSINOPHIL # BLD AUTO: 0.11 K/UL — SIGNIFICANT CHANGE UP (ref 0–0.5)
EOSINOPHIL # BLD AUTO: 0.12 K/UL — SIGNIFICANT CHANGE UP (ref 0–0.5)
EOSINOPHIL # BLD AUTO: 0.12 K/UL — SIGNIFICANT CHANGE UP (ref 0–0.5)
EOSINOPHIL # BLD AUTO: 0.13 K/UL — SIGNIFICANT CHANGE UP (ref 0–0.5)
EOSINOPHIL # BLD AUTO: 0.14 K/UL — SIGNIFICANT CHANGE UP (ref 0–0.5)
EOSINOPHIL # BLD AUTO: 0.15 K/UL — SIGNIFICANT CHANGE UP (ref 0–0.5)
EOSINOPHIL # BLD AUTO: 0.15 K/UL — SIGNIFICANT CHANGE UP (ref 0–0.5)
EOSINOPHIL # BLD AUTO: 0.17 K/UL — SIGNIFICANT CHANGE UP (ref 0–0.5)
EOSINOPHIL # BLD AUTO: 0.18 K/UL — SIGNIFICANT CHANGE UP (ref 0–0.5)
EOSINOPHIL # BLD AUTO: 0.18 K/UL — SIGNIFICANT CHANGE UP (ref 0–0.5)
EOSINOPHIL # BLD AUTO: 0.19 K/UL — SIGNIFICANT CHANGE UP (ref 0–0.5)
EOSINOPHIL # BLD AUTO: 0.2 K/UL — SIGNIFICANT CHANGE UP (ref 0–0.5)
EOSINOPHIL # BLD AUTO: 0.21 K/UL — SIGNIFICANT CHANGE UP (ref 0–0.5)
EOSINOPHIL # BLD AUTO: 0.25 K/UL — SIGNIFICANT CHANGE UP (ref 0–0.5)
EOSINOPHIL # BLD AUTO: 0.31 K/UL — SIGNIFICANT CHANGE UP (ref 0–0.5)
EOSINOPHIL # BLD AUTO: 0.33 K/UL — SIGNIFICANT CHANGE UP (ref 0–0.5)
EOSINOPHIL # BLD AUTO: 0.33 K/UL — SIGNIFICANT CHANGE UP (ref 0–0.5)
EOSINOPHIL # BLD AUTO: 0.34 K/UL — SIGNIFICANT CHANGE UP (ref 0–0.5)
EOSINOPHIL # BLD AUTO: 0.35 K/UL — SIGNIFICANT CHANGE UP (ref 0–0.5)
EOSINOPHIL # BLD AUTO: 0.36 K/UL — SIGNIFICANT CHANGE UP (ref 0–0.5)
EOSINOPHIL # BLD AUTO: 0.4 K/UL — SIGNIFICANT CHANGE UP (ref 0–0.5)
EOSINOPHIL # BLD AUTO: 0.46 K/UL — SIGNIFICANT CHANGE UP (ref 0–0.5)
EOSINOPHIL # BLD AUTO: 0.46 K/UL — SIGNIFICANT CHANGE UP (ref 0–0.5)
EOSINOPHIL # BLD AUTO: 0.47 K/UL — SIGNIFICANT CHANGE UP (ref 0–0.5)
EOSINOPHIL # BLD AUTO: 0.47 K/UL — SIGNIFICANT CHANGE UP (ref 0–0.5)
EOSINOPHIL # BLD AUTO: 0.5 K/UL — SIGNIFICANT CHANGE UP (ref 0–0.5)
EOSINOPHIL # BLD AUTO: 0.5 K/UL — SIGNIFICANT CHANGE UP (ref 0–0.5)
EOSINOPHIL # BLD AUTO: 0.51 K/UL — HIGH (ref 0–0.5)
EOSINOPHIL # BLD AUTO: 0.51 K/UL — HIGH (ref 0–0.5)
EOSINOPHIL # BLD AUTO: 0.52 K/UL — HIGH (ref 0–0.5)
EOSINOPHIL # BLD AUTO: 0.57 K/UL — HIGH (ref 0–0.5)
EOSINOPHIL # BLD AUTO: 0.6 K/UL — HIGH (ref 0–0.5)
EOSINOPHIL # BLD AUTO: 0.64 K/UL — HIGH (ref 0–0.5)
EOSINOPHIL # BLD AUTO: 0.66 K/UL — HIGH (ref 0–0.5)
EOSINOPHIL # BLD AUTO: 0.66 K/UL — HIGH (ref 0–0.5)
EOSINOPHIL # BLD AUTO: 0.67 K/UL — HIGH (ref 0–0.5)
EOSINOPHIL # BLD AUTO: 0.67 K/UL — HIGH (ref 0–0.5)
EOSINOPHIL # BLD AUTO: 0.7 K/UL — HIGH (ref 0–0.5)
EOSINOPHIL # BLD AUTO: 0.7 K/UL — HIGH (ref 0–0.5)
EOSINOPHIL # BLD AUTO: 0.74 K/UL — HIGH (ref 0–0.5)
EOSINOPHIL # BLD AUTO: 0.77 K/UL — HIGH (ref 0–0.5)
EOSINOPHIL # BLD AUTO: 0.77 K/UL — HIGH (ref 0–0.5)
EOSINOPHIL # BLD AUTO: 0.79 K/UL — HIGH (ref 0–0.5)
EOSINOPHIL # BLD AUTO: 0.79 K/UL — HIGH (ref 0–0.5)
EOSINOPHIL # BLD AUTO: 0.83 K/UL — HIGH (ref 0–0.5)
EOSINOPHIL # BLD AUTO: 0.83 K/UL — HIGH (ref 0–0.5)
EOSINOPHIL # BLD AUTO: 0.9 K/UL — HIGH (ref 0–0.5)
EOSINOPHIL # BLD AUTO: 0.9 K/UL — HIGH (ref 0–0.5)
EOSINOPHIL # BLD AUTO: 0.96 K/UL — HIGH (ref 0–0.5)
EOSINOPHIL # BLD AUTO: 0.96 K/UL — HIGH (ref 0–0.5)
EOSINOPHIL # BLD AUTO: 1 K/UL — HIGH (ref 0–0.5)
EOSINOPHIL # BLD AUTO: 1.06 K/UL — HIGH (ref 0–0.5)
EOSINOPHIL # BLD AUTO: 1.09 K/UL — HIGH (ref 0–0.5)
EOSINOPHIL # BLD AUTO: 1.09 K/UL — HIGH (ref 0–0.5)
EOSINOPHIL # BLD AUTO: 1.11 K/UL — HIGH (ref 0–0.5)
EOSINOPHIL # BLD AUTO: 1.11 K/UL — HIGH (ref 0–0.5)
EOSINOPHIL # BLD AUTO: 1.12 K/UL — HIGH (ref 0–0.5)
EOSINOPHIL # BLD AUTO: 1.12 K/UL — HIGH (ref 0–0.5)
EOSINOPHIL # BLD AUTO: 1.21 K/UL — HIGH (ref 0–0.5)
EOSINOPHIL # BLD AUTO: 1.21 K/UL — HIGH (ref 0–0.5)
EOSINOPHIL # BLD AUTO: 1.23 K/UL — HIGH (ref 0–0.5)
EOSINOPHIL # BLD AUTO: 1.23 K/UL — HIGH (ref 0–0.5)
EOSINOPHIL # BLD AUTO: 1.31 K/UL — HIGH (ref 0–0.5)
EOSINOPHIL # BLD AUTO: 1.31 K/UL — HIGH (ref 0–0.5)
EOSINOPHIL # BLD AUTO: 1.34 K/UL — HIGH (ref 0–0.5)
EOSINOPHIL # BLD AUTO: 1.34 K/UL — HIGH (ref 0–0.5)
EOSINOPHIL # BLD AUTO: 1.35 K/UL — HIGH (ref 0–0.5)
EOSINOPHIL # BLD AUTO: 1.36 K/UL — HIGH (ref 0–0.5)
EOSINOPHIL # BLD AUTO: 1.36 K/UL — HIGH (ref 0–0.5)
EOSINOPHIL # BLD AUTO: 1.5 K/UL — HIGH (ref 0–0.5)
EOSINOPHIL # BLD AUTO: 1.53 K/UL — HIGH (ref 0–0.5)
EOSINOPHIL # BLD AUTO: 1.54 K/UL — HIGH (ref 0–0.5)
EOSINOPHIL # BLD AUTO: 1.54 K/UL — HIGH (ref 0–0.5)
EOSINOPHIL # BLD AUTO: 1.56 K/UL — HIGH (ref 0–0.5)
EOSINOPHIL # BLD AUTO: 1.57 K/UL — HIGH (ref 0–0.5)
EOSINOPHIL # BLD AUTO: 1.57 K/UL — HIGH (ref 0–0.5)
EOSINOPHIL # BLD AUTO: 1.63 K/UL — HIGH (ref 0–0.5)
EOSINOPHIL # BLD AUTO: 1.69 K/UL — HIGH (ref 0–0.5)
EOSINOPHIL # BLD AUTO: 1.69 K/UL — HIGH (ref 0–0.5)
EOSINOPHIL # BLD AUTO: 1.84 K/UL — HIGH (ref 0–0.5)
EOSINOPHIL # BLD AUTO: 1.84 K/UL — HIGH (ref 0–0.5)
EOSINOPHIL # BLD AUTO: 2.68 K/UL — HIGH (ref 0–0.5)
EOSINOPHIL # FLD: 0 % — SIGNIFICANT CHANGE UP
EOSINOPHIL # FLD: 0 % — SIGNIFICANT CHANGE UP
EOSINOPHIL NFR BLD AUTO: 0 % — SIGNIFICANT CHANGE UP (ref 0–6)
EOSINOPHIL NFR BLD AUTO: 0.1 % — SIGNIFICANT CHANGE UP (ref 0–6)
EOSINOPHIL NFR BLD AUTO: 0.2 % — SIGNIFICANT CHANGE UP (ref 0–6)
EOSINOPHIL NFR BLD AUTO: 0.3 % — SIGNIFICANT CHANGE UP (ref 0–6)
EOSINOPHIL NFR BLD AUTO: 0.3 % — SIGNIFICANT CHANGE UP (ref 0–6)
EOSINOPHIL NFR BLD AUTO: 0.5 % — SIGNIFICANT CHANGE UP (ref 0–6)
EOSINOPHIL NFR BLD AUTO: 0.7 % — SIGNIFICANT CHANGE UP (ref 0–6)
EOSINOPHIL NFR BLD AUTO: 0.8 % — SIGNIFICANT CHANGE UP (ref 0–6)
EOSINOPHIL NFR BLD AUTO: 0.9 % — SIGNIFICANT CHANGE UP (ref 0–6)
EOSINOPHIL NFR BLD AUTO: 1.1 % — SIGNIFICANT CHANGE UP (ref 0–6)
EOSINOPHIL NFR BLD AUTO: 1.1 % — SIGNIFICANT CHANGE UP (ref 0–6)
EOSINOPHIL NFR BLD AUTO: 1.2 % — SIGNIFICANT CHANGE UP (ref 0–6)
EOSINOPHIL NFR BLD AUTO: 1.4 % — SIGNIFICANT CHANGE UP (ref 0–6)
EOSINOPHIL NFR BLD AUTO: 1.4 % — SIGNIFICANT CHANGE UP (ref 0–6)
EOSINOPHIL NFR BLD AUTO: 1.6 % — SIGNIFICANT CHANGE UP (ref 0–6)
EOSINOPHIL NFR BLD AUTO: 1.6 % — SIGNIFICANT CHANGE UP (ref 0–6)
EOSINOPHIL NFR BLD AUTO: 1.7 % — SIGNIFICANT CHANGE UP (ref 0–6)
EOSINOPHIL NFR BLD AUTO: 10 % — HIGH (ref 0–6)
EOSINOPHIL NFR BLD AUTO: 10.1 % — HIGH (ref 0–6)
EOSINOPHIL NFR BLD AUTO: 10.1 % — HIGH (ref 0–6)
EOSINOPHIL NFR BLD AUTO: 10.5 % — HIGH (ref 0–6)
EOSINOPHIL NFR BLD AUTO: 10.5 % — HIGH (ref 0–6)
EOSINOPHIL NFR BLD AUTO: 10.9 % — HIGH (ref 0–6)
EOSINOPHIL NFR BLD AUTO: 11 % — HIGH (ref 0–6)
EOSINOPHIL NFR BLD AUTO: 11 % — HIGH (ref 0–6)
EOSINOPHIL NFR BLD AUTO: 11.1 % — HIGH (ref 0–6)
EOSINOPHIL NFR BLD AUTO: 11.5 % — HIGH (ref 0–6)
EOSINOPHIL NFR BLD AUTO: 11.7 % — HIGH (ref 0–6)
EOSINOPHIL NFR BLD AUTO: 12 % — HIGH (ref 0–6)
EOSINOPHIL NFR BLD AUTO: 12 % — HIGH (ref 0–6)
EOSINOPHIL NFR BLD AUTO: 13.3 % — HIGH (ref 0–6)
EOSINOPHIL NFR BLD AUTO: 15 % — HIGH (ref 0–6)
EOSINOPHIL NFR BLD AUTO: 2 % — SIGNIFICANT CHANGE UP (ref 0–6)
EOSINOPHIL NFR BLD AUTO: 2.2 % — SIGNIFICANT CHANGE UP (ref 0–6)
EOSINOPHIL NFR BLD AUTO: 2.2 % — SIGNIFICANT CHANGE UP (ref 0–6)
EOSINOPHIL NFR BLD AUTO: 2.3 % — SIGNIFICANT CHANGE UP (ref 0–6)
EOSINOPHIL NFR BLD AUTO: 2.3 % — SIGNIFICANT CHANGE UP (ref 0–6)
EOSINOPHIL NFR BLD AUTO: 2.4 % — SIGNIFICANT CHANGE UP (ref 0–6)
EOSINOPHIL NFR BLD AUTO: 2.4 % — SIGNIFICANT CHANGE UP (ref 0–6)
EOSINOPHIL NFR BLD AUTO: 2.6 % — SIGNIFICANT CHANGE UP (ref 0–6)
EOSINOPHIL NFR BLD AUTO: 2.6 % — SIGNIFICANT CHANGE UP (ref 0–6)
EOSINOPHIL NFR BLD AUTO: 2.7 % — SIGNIFICANT CHANGE UP (ref 0–6)
EOSINOPHIL NFR BLD AUTO: 2.8 % — SIGNIFICANT CHANGE UP (ref 0–6)
EOSINOPHIL NFR BLD AUTO: 2.9 % — SIGNIFICANT CHANGE UP (ref 0–6)
EOSINOPHIL NFR BLD AUTO: 3 % — SIGNIFICANT CHANGE UP (ref 0–6)
EOSINOPHIL NFR BLD AUTO: 3.1 % — SIGNIFICANT CHANGE UP (ref 0–6)
EOSINOPHIL NFR BLD AUTO: 3.1 % — SIGNIFICANT CHANGE UP (ref 0–6)
EOSINOPHIL NFR BLD AUTO: 3.2 % — SIGNIFICANT CHANGE UP (ref 0–6)
EOSINOPHIL NFR BLD AUTO: 3.3 % — SIGNIFICANT CHANGE UP (ref 0–6)
EOSINOPHIL NFR BLD AUTO: 3.4 % — SIGNIFICANT CHANGE UP (ref 0–6)
EOSINOPHIL NFR BLD AUTO: 3.4 % — SIGNIFICANT CHANGE UP (ref 0–6)
EOSINOPHIL NFR BLD AUTO: 3.5 % — SIGNIFICANT CHANGE UP (ref 0–6)
EOSINOPHIL NFR BLD AUTO: 3.6 % — SIGNIFICANT CHANGE UP (ref 0–6)
EOSINOPHIL NFR BLD AUTO: 3.6 % — SIGNIFICANT CHANGE UP (ref 0–6)
EOSINOPHIL NFR BLD AUTO: 3.7 % — SIGNIFICANT CHANGE UP (ref 0–6)
EOSINOPHIL NFR BLD AUTO: 3.7 % — SIGNIFICANT CHANGE UP (ref 0–6)
EOSINOPHIL NFR BLD AUTO: 3.8 % — SIGNIFICANT CHANGE UP (ref 0–6)
EOSINOPHIL NFR BLD AUTO: 3.9 % — SIGNIFICANT CHANGE UP (ref 0–6)
EOSINOPHIL NFR BLD AUTO: 3.9 % — SIGNIFICANT CHANGE UP (ref 0–6)
EOSINOPHIL NFR BLD AUTO: 4.3 % — SIGNIFICANT CHANGE UP (ref 0–6)
EOSINOPHIL NFR BLD AUTO: 4.3 % — SIGNIFICANT CHANGE UP (ref 0–6)
EOSINOPHIL NFR BLD AUTO: 4.4 % — SIGNIFICANT CHANGE UP (ref 0–6)
EOSINOPHIL NFR BLD AUTO: 5.1 % — SIGNIFICANT CHANGE UP (ref 0–6)
EOSINOPHIL NFR BLD AUTO: 5.1 % — SIGNIFICANT CHANGE UP (ref 0–6)
EOSINOPHIL NFR BLD AUTO: 5.2 % — SIGNIFICANT CHANGE UP (ref 0–6)
EOSINOPHIL NFR BLD AUTO: 5.2 % — SIGNIFICANT CHANGE UP (ref 0–6)
EOSINOPHIL NFR BLD AUTO: 5.4 % — SIGNIFICANT CHANGE UP (ref 0–6)
EOSINOPHIL NFR BLD AUTO: 5.6 % — SIGNIFICANT CHANGE UP (ref 0–6)
EOSINOPHIL NFR BLD AUTO: 5.9 % — SIGNIFICANT CHANGE UP (ref 0–6)
EOSINOPHIL NFR BLD AUTO: 6.2 % — HIGH (ref 0–6)
EOSINOPHIL NFR BLD AUTO: 7 % — HIGH (ref 0–6)
EOSINOPHIL NFR BLD AUTO: 7 % — HIGH (ref 0–6)
EOSINOPHIL NFR BLD AUTO: 7.1 % — HIGH (ref 0–6)
EOSINOPHIL NFR BLD AUTO: 7.1 % — HIGH (ref 0–6)
EOSINOPHIL NFR BLD AUTO: 7.4 % — HIGH (ref 0–6)
EOSINOPHIL NFR BLD AUTO: 7.8 % — HIGH (ref 0–6)
EOSINOPHIL NFR BLD AUTO: 7.8 % — HIGH (ref 0–6)
EOSINOPHIL NFR BLD AUTO: 8 % — HIGH (ref 0–6)
EOSINOPHIL NFR BLD AUTO: 8 % — HIGH (ref 0–6)
EOSINOPHIL NFR BLD AUTO: 8.2 % — HIGH (ref 0–6)
EOSINOPHIL NFR BLD AUTO: 8.2 % — HIGH (ref 0–6)
EOSINOPHIL NFR BLD AUTO: 8.4 % — HIGH (ref 0–6)
EOSINOPHIL NFR BLD AUTO: 8.4 % — HIGH (ref 0–6)
EOSINOPHIL NFR BLD AUTO: 8.5 % — HIGH (ref 0–6)
EOSINOPHIL NFR BLD AUTO: 8.7 % — HIGH (ref 0–6)
EOSINOPHIL NFR BLD AUTO: 8.8 % — HIGH (ref 0–6)
EOSINOPHIL NFR BLD AUTO: 8.8 % — HIGH (ref 0–6)
EOSINOPHIL NFR BLD AUTO: 8.9 % — HIGH (ref 0–6)
EOSINOPHIL NFR BLD AUTO: 8.9 % — HIGH (ref 0–6)
EOSINOPHIL NFR BLD AUTO: 9.6 % — HIGH (ref 0–6)
EOSINOPHIL NFR BLD AUTO: 9.6 % — HIGH (ref 0–6)
EPI CELLS # UR: 9 /HPF — HIGH
ESTIMATED AVERAGE GLUCOSE: 157 MG/DL — HIGH (ref 68–114)
FERRITIN SERPL-MCNC: 1119 NG/ML — HIGH (ref 13–330)
FERRITIN SERPL-MCNC: 116 NG/ML — SIGNIFICANT CHANGE UP (ref 13–330)
FERRITIN SERPL-MCNC: 470 NG/ML — HIGH (ref 13–330)
FLUAV SUBTYP SPEC NAA+PROBE: SIGNIFICANT CHANGE UP
FLUBV RNA SPEC QL NAA+PROBE: SIGNIFICANT CHANGE UP
FLUID INTAKE SUBSTANCE CLASS: SIGNIFICANT CHANGE UP
FOLATE SERPL-MCNC: 16 NG/ML — SIGNIFICANT CHANGE UP (ref 3.1–17.5)
FOLATE+VIT B12 SERBLD-IMP: 0 % — SIGNIFICANT CHANGE UP
FOLATE+VIT B12 SERBLD-IMP: 0 % — SIGNIFICANT CHANGE UP
GAS PNL BLDA: SIGNIFICANT CHANGE UP
GAS PNL BLDV: 122 MMOL/L — LOW (ref 136–145)
GAS PNL BLDV: 123 MMOL/L — LOW (ref 136–145)
GAS PNL BLDV: 125 MMOL/L — LOW (ref 136–145)
GAS PNL BLDV: 128 MMOL/L — LOW (ref 136–145)
GAS PNL BLDV: 128 MMOL/L — LOW (ref 136–145)
GAS PNL BLDV: 129 MMOL/L — LOW (ref 136–145)
GAS PNL BLDV: 129 MMOL/L — LOW (ref 136–145)
GAS PNL BLDV: 130 MMOL/L — LOW (ref 136–145)
GAS PNL BLDV: 131 MMOL/L — LOW (ref 136–145)
GAS PNL BLDV: 132 MMOL/L — LOW (ref 136–145)
GAS PNL BLDV: 132 MMOL/L — LOW (ref 136–145)
GAS PNL BLDV: 133 MMOL/L — LOW (ref 136–145)
GAS PNL BLDV: 134 MMOL/L — LOW (ref 136–145)
GAS PNL BLDV: 135 MMOL/L — LOW (ref 136–145)
GAS PNL BLDV: 135 MMOL/L — LOW (ref 136–145)
GAS PNL BLDV: 137 MMOL/L — SIGNIFICANT CHANGE UP (ref 136–145)
GAS PNL BLDV: 138 MMOL/L — SIGNIFICANT CHANGE UP (ref 136–145)
GAS PNL BLDV: SIGNIFICANT CHANGE UP
GI PCR PANEL: SIGNIFICANT CHANGE UP
GI PCR PANEL: SIGNIFICANT CHANGE UP
GIANT PLATELETS BLD QL SMEAR: PRESENT — SIGNIFICANT CHANGE UP
GLUCOSE BLDC GLUCOMTR-MCNC: 100 MG/DL — HIGH (ref 70–99)
GLUCOSE BLDC GLUCOMTR-MCNC: 100 MG/DL — HIGH (ref 70–99)
GLUCOSE BLDC GLUCOMTR-MCNC: 101 MG/DL — HIGH (ref 70–99)
GLUCOSE BLDC GLUCOMTR-MCNC: 102 MG/DL — HIGH (ref 70–99)
GLUCOSE BLDC GLUCOMTR-MCNC: 102 MG/DL — HIGH (ref 70–99)
GLUCOSE BLDC GLUCOMTR-MCNC: 103 MG/DL — HIGH (ref 70–99)
GLUCOSE BLDC GLUCOMTR-MCNC: 104 MG/DL — HIGH (ref 70–99)
GLUCOSE BLDC GLUCOMTR-MCNC: 105 MG/DL — HIGH (ref 70–99)
GLUCOSE BLDC GLUCOMTR-MCNC: 106 MG/DL — HIGH (ref 70–99)
GLUCOSE BLDC GLUCOMTR-MCNC: 106 MG/DL — HIGH (ref 70–99)
GLUCOSE BLDC GLUCOMTR-MCNC: 108 MG/DL — HIGH (ref 70–99)
GLUCOSE BLDC GLUCOMTR-MCNC: 109 MG/DL — HIGH (ref 70–99)
GLUCOSE BLDC GLUCOMTR-MCNC: 110 MG/DL — HIGH (ref 70–99)
GLUCOSE BLDC GLUCOMTR-MCNC: 111 MG/DL — HIGH (ref 70–99)
GLUCOSE BLDC GLUCOMTR-MCNC: 112 MG/DL — HIGH (ref 70–99)
GLUCOSE BLDC GLUCOMTR-MCNC: 113 MG/DL — HIGH (ref 70–99)
GLUCOSE BLDC GLUCOMTR-MCNC: 114 MG/DL — HIGH (ref 70–99)
GLUCOSE BLDC GLUCOMTR-MCNC: 115 MG/DL — HIGH (ref 70–99)
GLUCOSE BLDC GLUCOMTR-MCNC: 115 MG/DL — HIGH (ref 70–99)
GLUCOSE BLDC GLUCOMTR-MCNC: 116 MG/DL — HIGH (ref 70–99)
GLUCOSE BLDC GLUCOMTR-MCNC: 117 MG/DL — HIGH (ref 70–99)
GLUCOSE BLDC GLUCOMTR-MCNC: 117 MG/DL — HIGH (ref 70–99)
GLUCOSE BLDC GLUCOMTR-MCNC: 118 MG/DL — HIGH (ref 70–99)
GLUCOSE BLDC GLUCOMTR-MCNC: 118 MG/DL — HIGH (ref 70–99)
GLUCOSE BLDC GLUCOMTR-MCNC: 119 MG/DL — HIGH (ref 70–99)
GLUCOSE BLDC GLUCOMTR-MCNC: 122 MG/DL — HIGH (ref 70–99)
GLUCOSE BLDC GLUCOMTR-MCNC: 122 MG/DL — HIGH (ref 70–99)
GLUCOSE BLDC GLUCOMTR-MCNC: 123 MG/DL — HIGH (ref 70–99)
GLUCOSE BLDC GLUCOMTR-MCNC: 124 MG/DL — HIGH (ref 70–99)
GLUCOSE BLDC GLUCOMTR-MCNC: 127 MG/DL — HIGH (ref 70–99)
GLUCOSE BLDC GLUCOMTR-MCNC: 128 MG/DL — HIGH (ref 70–99)
GLUCOSE BLDC GLUCOMTR-MCNC: 128 MG/DL — HIGH (ref 70–99)
GLUCOSE BLDC GLUCOMTR-MCNC: 129 MG/DL — HIGH (ref 70–99)
GLUCOSE BLDC GLUCOMTR-MCNC: 130 MG/DL — HIGH (ref 70–99)
GLUCOSE BLDC GLUCOMTR-MCNC: 131 MG/DL — HIGH (ref 70–99)
GLUCOSE BLDC GLUCOMTR-MCNC: 132 MG/DL — HIGH (ref 70–99)
GLUCOSE BLDC GLUCOMTR-MCNC: 133 MG/DL — HIGH (ref 70–99)
GLUCOSE BLDC GLUCOMTR-MCNC: 134 MG/DL — HIGH (ref 70–99)
GLUCOSE BLDC GLUCOMTR-MCNC: 135 MG/DL — HIGH (ref 70–99)
GLUCOSE BLDC GLUCOMTR-MCNC: 136 MG/DL — HIGH (ref 70–99)
GLUCOSE BLDC GLUCOMTR-MCNC: 138 MG/DL — HIGH (ref 70–99)
GLUCOSE BLDC GLUCOMTR-MCNC: 139 MG/DL — HIGH (ref 70–99)
GLUCOSE BLDC GLUCOMTR-MCNC: 140 MG/DL — HIGH (ref 70–99)
GLUCOSE BLDC GLUCOMTR-MCNC: 141 MG/DL — HIGH (ref 70–99)
GLUCOSE BLDC GLUCOMTR-MCNC: 142 MG/DL — HIGH (ref 70–99)
GLUCOSE BLDC GLUCOMTR-MCNC: 142 MG/DL — HIGH (ref 70–99)
GLUCOSE BLDC GLUCOMTR-MCNC: 143 MG/DL — HIGH (ref 70–99)
GLUCOSE BLDC GLUCOMTR-MCNC: 144 MG/DL — HIGH (ref 70–99)
GLUCOSE BLDC GLUCOMTR-MCNC: 145 MG/DL — HIGH (ref 70–99)
GLUCOSE BLDC GLUCOMTR-MCNC: 146 MG/DL — HIGH (ref 70–99)
GLUCOSE BLDC GLUCOMTR-MCNC: 147 MG/DL — HIGH (ref 70–99)
GLUCOSE BLDC GLUCOMTR-MCNC: 147 MG/DL — HIGH (ref 70–99)
GLUCOSE BLDC GLUCOMTR-MCNC: 148 MG/DL — HIGH (ref 70–99)
GLUCOSE BLDC GLUCOMTR-MCNC: 149 MG/DL — HIGH (ref 70–99)
GLUCOSE BLDC GLUCOMTR-MCNC: 150 MG/DL — HIGH (ref 70–99)
GLUCOSE BLDC GLUCOMTR-MCNC: 150 MG/DL — HIGH (ref 70–99)
GLUCOSE BLDC GLUCOMTR-MCNC: 151 MG/DL — HIGH (ref 70–99)
GLUCOSE BLDC GLUCOMTR-MCNC: 152 MG/DL — HIGH (ref 70–99)
GLUCOSE BLDC GLUCOMTR-MCNC: 153 MG/DL — HIGH (ref 70–99)
GLUCOSE BLDC GLUCOMTR-MCNC: 153 MG/DL — HIGH (ref 70–99)
GLUCOSE BLDC GLUCOMTR-MCNC: 154 MG/DL — HIGH (ref 70–99)
GLUCOSE BLDC GLUCOMTR-MCNC: 155 MG/DL — HIGH (ref 70–99)
GLUCOSE BLDC GLUCOMTR-MCNC: 156 MG/DL — HIGH (ref 70–99)
GLUCOSE BLDC GLUCOMTR-MCNC: 157 MG/DL — HIGH (ref 70–99)
GLUCOSE BLDC GLUCOMTR-MCNC: 158 MG/DL — HIGH (ref 70–99)
GLUCOSE BLDC GLUCOMTR-MCNC: 159 MG/DL — HIGH (ref 70–99)
GLUCOSE BLDC GLUCOMTR-MCNC: 160 MG/DL — HIGH (ref 70–99)
GLUCOSE BLDC GLUCOMTR-MCNC: 161 MG/DL — HIGH (ref 70–99)
GLUCOSE BLDC GLUCOMTR-MCNC: 162 MG/DL — HIGH (ref 70–99)
GLUCOSE BLDC GLUCOMTR-MCNC: 163 MG/DL — HIGH (ref 70–99)
GLUCOSE BLDC GLUCOMTR-MCNC: 164 MG/DL — HIGH (ref 70–99)
GLUCOSE BLDC GLUCOMTR-MCNC: 165 MG/DL — HIGH (ref 70–99)
GLUCOSE BLDC GLUCOMTR-MCNC: 166 MG/DL — HIGH (ref 70–99)
GLUCOSE BLDC GLUCOMTR-MCNC: 167 MG/DL — HIGH (ref 70–99)
GLUCOSE BLDC GLUCOMTR-MCNC: 168 MG/DL — HIGH (ref 70–99)
GLUCOSE BLDC GLUCOMTR-MCNC: 169 MG/DL — HIGH (ref 70–99)
GLUCOSE BLDC GLUCOMTR-MCNC: 170 MG/DL — HIGH (ref 70–99)
GLUCOSE BLDC GLUCOMTR-MCNC: 171 MG/DL — HIGH (ref 70–99)
GLUCOSE BLDC GLUCOMTR-MCNC: 171 MG/DL — HIGH (ref 70–99)
GLUCOSE BLDC GLUCOMTR-MCNC: 172 MG/DL — HIGH (ref 70–99)
GLUCOSE BLDC GLUCOMTR-MCNC: 174 MG/DL — HIGH (ref 70–99)
GLUCOSE BLDC GLUCOMTR-MCNC: 175 MG/DL — HIGH (ref 70–99)
GLUCOSE BLDC GLUCOMTR-MCNC: 175 MG/DL — HIGH (ref 70–99)
GLUCOSE BLDC GLUCOMTR-MCNC: 176 MG/DL — HIGH (ref 70–99)
GLUCOSE BLDC GLUCOMTR-MCNC: 177 MG/DL — HIGH (ref 70–99)
GLUCOSE BLDC GLUCOMTR-MCNC: 178 MG/DL — HIGH (ref 70–99)
GLUCOSE BLDC GLUCOMTR-MCNC: 179 MG/DL — HIGH (ref 70–99)
GLUCOSE BLDC GLUCOMTR-MCNC: 180 MG/DL — HIGH (ref 70–99)
GLUCOSE BLDC GLUCOMTR-MCNC: 180 MG/DL — HIGH (ref 70–99)
GLUCOSE BLDC GLUCOMTR-MCNC: 181 MG/DL — HIGH (ref 70–99)
GLUCOSE BLDC GLUCOMTR-MCNC: 182 MG/DL — HIGH (ref 70–99)
GLUCOSE BLDC GLUCOMTR-MCNC: 182 MG/DL — HIGH (ref 70–99)
GLUCOSE BLDC GLUCOMTR-MCNC: 183 MG/DL — HIGH (ref 70–99)
GLUCOSE BLDC GLUCOMTR-MCNC: 184 MG/DL — HIGH (ref 70–99)
GLUCOSE BLDC GLUCOMTR-MCNC: 185 MG/DL — HIGH (ref 70–99)
GLUCOSE BLDC GLUCOMTR-MCNC: 186 MG/DL — HIGH (ref 70–99)
GLUCOSE BLDC GLUCOMTR-MCNC: 187 MG/DL — HIGH (ref 70–99)
GLUCOSE BLDC GLUCOMTR-MCNC: 188 MG/DL — HIGH (ref 70–99)
GLUCOSE BLDC GLUCOMTR-MCNC: 189 MG/DL — HIGH (ref 70–99)
GLUCOSE BLDC GLUCOMTR-MCNC: 190 MG/DL — HIGH (ref 70–99)
GLUCOSE BLDC GLUCOMTR-MCNC: 191 MG/DL — HIGH (ref 70–99)
GLUCOSE BLDC GLUCOMTR-MCNC: 192 MG/DL — HIGH (ref 70–99)
GLUCOSE BLDC GLUCOMTR-MCNC: 193 MG/DL — HIGH (ref 70–99)
GLUCOSE BLDC GLUCOMTR-MCNC: 194 MG/DL — HIGH (ref 70–99)
GLUCOSE BLDC GLUCOMTR-MCNC: 195 MG/DL — HIGH (ref 70–99)
GLUCOSE BLDC GLUCOMTR-MCNC: 195 MG/DL — HIGH (ref 70–99)
GLUCOSE BLDC GLUCOMTR-MCNC: 196 MG/DL — HIGH (ref 70–99)
GLUCOSE BLDC GLUCOMTR-MCNC: 196 MG/DL — HIGH (ref 70–99)
GLUCOSE BLDC GLUCOMTR-MCNC: 197 MG/DL — HIGH (ref 70–99)
GLUCOSE BLDC GLUCOMTR-MCNC: 198 MG/DL — HIGH (ref 70–99)
GLUCOSE BLDC GLUCOMTR-MCNC: 199 MG/DL — HIGH (ref 70–99)
GLUCOSE BLDC GLUCOMTR-MCNC: 200 MG/DL — HIGH (ref 70–99)
GLUCOSE BLDC GLUCOMTR-MCNC: 201 MG/DL — HIGH (ref 70–99)
GLUCOSE BLDC GLUCOMTR-MCNC: 202 MG/DL — HIGH (ref 70–99)
GLUCOSE BLDC GLUCOMTR-MCNC: 203 MG/DL — HIGH (ref 70–99)
GLUCOSE BLDC GLUCOMTR-MCNC: 204 MG/DL — HIGH (ref 70–99)
GLUCOSE BLDC GLUCOMTR-MCNC: 205 MG/DL — HIGH (ref 70–99)
GLUCOSE BLDC GLUCOMTR-MCNC: 205 MG/DL — HIGH (ref 70–99)
GLUCOSE BLDC GLUCOMTR-MCNC: 206 MG/DL — HIGH (ref 70–99)
GLUCOSE BLDC GLUCOMTR-MCNC: 206 MG/DL — HIGH (ref 70–99)
GLUCOSE BLDC GLUCOMTR-MCNC: 207 MG/DL — HIGH (ref 70–99)
GLUCOSE BLDC GLUCOMTR-MCNC: 208 MG/DL — HIGH (ref 70–99)
GLUCOSE BLDC GLUCOMTR-MCNC: 209 MG/DL — HIGH (ref 70–99)
GLUCOSE BLDC GLUCOMTR-MCNC: 210 MG/DL — HIGH (ref 70–99)
GLUCOSE BLDC GLUCOMTR-MCNC: 211 MG/DL — HIGH (ref 70–99)
GLUCOSE BLDC GLUCOMTR-MCNC: 212 MG/DL — HIGH (ref 70–99)
GLUCOSE BLDC GLUCOMTR-MCNC: 213 MG/DL — HIGH (ref 70–99)
GLUCOSE BLDC GLUCOMTR-MCNC: 214 MG/DL — HIGH (ref 70–99)
GLUCOSE BLDC GLUCOMTR-MCNC: 214 MG/DL — HIGH (ref 70–99)
GLUCOSE BLDC GLUCOMTR-MCNC: 215 MG/DL — HIGH (ref 70–99)
GLUCOSE BLDC GLUCOMTR-MCNC: 216 MG/DL — HIGH (ref 70–99)
GLUCOSE BLDC GLUCOMTR-MCNC: 217 MG/DL — HIGH (ref 70–99)
GLUCOSE BLDC GLUCOMTR-MCNC: 217 MG/DL — HIGH (ref 70–99)
GLUCOSE BLDC GLUCOMTR-MCNC: 218 MG/DL — HIGH (ref 70–99)
GLUCOSE BLDC GLUCOMTR-MCNC: 220 MG/DL — HIGH (ref 70–99)
GLUCOSE BLDC GLUCOMTR-MCNC: 220 MG/DL — HIGH (ref 70–99)
GLUCOSE BLDC GLUCOMTR-MCNC: 221 MG/DL — HIGH (ref 70–99)
GLUCOSE BLDC GLUCOMTR-MCNC: 222 MG/DL — HIGH (ref 70–99)
GLUCOSE BLDC GLUCOMTR-MCNC: 224 MG/DL — HIGH (ref 70–99)
GLUCOSE BLDC GLUCOMTR-MCNC: 225 MG/DL — HIGH (ref 70–99)
GLUCOSE BLDC GLUCOMTR-MCNC: 225 MG/DL — HIGH (ref 70–99)
GLUCOSE BLDC GLUCOMTR-MCNC: 226 MG/DL — HIGH (ref 70–99)
GLUCOSE BLDC GLUCOMTR-MCNC: 227 MG/DL — HIGH (ref 70–99)
GLUCOSE BLDC GLUCOMTR-MCNC: 228 MG/DL — HIGH (ref 70–99)
GLUCOSE BLDC GLUCOMTR-MCNC: 228 MG/DL — HIGH (ref 70–99)
GLUCOSE BLDC GLUCOMTR-MCNC: 229 MG/DL — HIGH (ref 70–99)
GLUCOSE BLDC GLUCOMTR-MCNC: 230 MG/DL — HIGH (ref 70–99)
GLUCOSE BLDC GLUCOMTR-MCNC: 231 MG/DL — HIGH (ref 70–99)
GLUCOSE BLDC GLUCOMTR-MCNC: 232 MG/DL — HIGH (ref 70–99)
GLUCOSE BLDC GLUCOMTR-MCNC: 233 MG/DL — HIGH (ref 70–99)
GLUCOSE BLDC GLUCOMTR-MCNC: 234 MG/DL — HIGH (ref 70–99)
GLUCOSE BLDC GLUCOMTR-MCNC: 234 MG/DL — HIGH (ref 70–99)
GLUCOSE BLDC GLUCOMTR-MCNC: 235 MG/DL — HIGH (ref 70–99)
GLUCOSE BLDC GLUCOMTR-MCNC: 235 MG/DL — HIGH (ref 70–99)
GLUCOSE BLDC GLUCOMTR-MCNC: 236 MG/DL — HIGH (ref 70–99)
GLUCOSE BLDC GLUCOMTR-MCNC: 237 MG/DL — HIGH (ref 70–99)
GLUCOSE BLDC GLUCOMTR-MCNC: 238 MG/DL — HIGH (ref 70–99)
GLUCOSE BLDC GLUCOMTR-MCNC: 239 MG/DL — HIGH (ref 70–99)
GLUCOSE BLDC GLUCOMTR-MCNC: 240 MG/DL — HIGH (ref 70–99)
GLUCOSE BLDC GLUCOMTR-MCNC: 241 MG/DL — HIGH (ref 70–99)
GLUCOSE BLDC GLUCOMTR-MCNC: 241 MG/DL — HIGH (ref 70–99)
GLUCOSE BLDC GLUCOMTR-MCNC: 243 MG/DL — HIGH (ref 70–99)
GLUCOSE BLDC GLUCOMTR-MCNC: 244 MG/DL — HIGH (ref 70–99)
GLUCOSE BLDC GLUCOMTR-MCNC: 245 MG/DL — HIGH (ref 70–99)
GLUCOSE BLDC GLUCOMTR-MCNC: 246 MG/DL — HIGH (ref 70–99)
GLUCOSE BLDC GLUCOMTR-MCNC: 248 MG/DL — HIGH (ref 70–99)
GLUCOSE BLDC GLUCOMTR-MCNC: 248 MG/DL — HIGH (ref 70–99)
GLUCOSE BLDC GLUCOMTR-MCNC: 249 MG/DL — HIGH (ref 70–99)
GLUCOSE BLDC GLUCOMTR-MCNC: 250 MG/DL — HIGH (ref 70–99)
GLUCOSE BLDC GLUCOMTR-MCNC: 251 MG/DL — HIGH (ref 70–99)
GLUCOSE BLDC GLUCOMTR-MCNC: 252 MG/DL — HIGH (ref 70–99)
GLUCOSE BLDC GLUCOMTR-MCNC: 253 MG/DL — HIGH (ref 70–99)
GLUCOSE BLDC GLUCOMTR-MCNC: 254 MG/DL — HIGH (ref 70–99)
GLUCOSE BLDC GLUCOMTR-MCNC: 254 MG/DL — HIGH (ref 70–99)
GLUCOSE BLDC GLUCOMTR-MCNC: 255 MG/DL — HIGH (ref 70–99)
GLUCOSE BLDC GLUCOMTR-MCNC: 256 MG/DL — HIGH (ref 70–99)
GLUCOSE BLDC GLUCOMTR-MCNC: 257 MG/DL — HIGH (ref 70–99)
GLUCOSE BLDC GLUCOMTR-MCNC: 257 MG/DL — HIGH (ref 70–99)
GLUCOSE BLDC GLUCOMTR-MCNC: 258 MG/DL — HIGH (ref 70–99)
GLUCOSE BLDC GLUCOMTR-MCNC: 258 MG/DL — HIGH (ref 70–99)
GLUCOSE BLDC GLUCOMTR-MCNC: 259 MG/DL — HIGH (ref 70–99)
GLUCOSE BLDC GLUCOMTR-MCNC: 260 MG/DL — HIGH (ref 70–99)
GLUCOSE BLDC GLUCOMTR-MCNC: 261 MG/DL — HIGH (ref 70–99)
GLUCOSE BLDC GLUCOMTR-MCNC: 263 MG/DL — HIGH (ref 70–99)
GLUCOSE BLDC GLUCOMTR-MCNC: 263 MG/DL — HIGH (ref 70–99)
GLUCOSE BLDC GLUCOMTR-MCNC: 264 MG/DL — HIGH (ref 70–99)
GLUCOSE BLDC GLUCOMTR-MCNC: 264 MG/DL — HIGH (ref 70–99)
GLUCOSE BLDC GLUCOMTR-MCNC: 265 MG/DL — HIGH (ref 70–99)
GLUCOSE BLDC GLUCOMTR-MCNC: 265 MG/DL — HIGH (ref 70–99)
GLUCOSE BLDC GLUCOMTR-MCNC: 266 MG/DL — HIGH (ref 70–99)
GLUCOSE BLDC GLUCOMTR-MCNC: 270 MG/DL — HIGH (ref 70–99)
GLUCOSE BLDC GLUCOMTR-MCNC: 270 MG/DL — HIGH (ref 70–99)
GLUCOSE BLDC GLUCOMTR-MCNC: 271 MG/DL — HIGH (ref 70–99)
GLUCOSE BLDC GLUCOMTR-MCNC: 272 MG/DL — HIGH (ref 70–99)
GLUCOSE BLDC GLUCOMTR-MCNC: 273 MG/DL — HIGH (ref 70–99)
GLUCOSE BLDC GLUCOMTR-MCNC: 275 MG/DL — HIGH (ref 70–99)
GLUCOSE BLDC GLUCOMTR-MCNC: 277 MG/DL — HIGH (ref 70–99)
GLUCOSE BLDC GLUCOMTR-MCNC: 277 MG/DL — HIGH (ref 70–99)
GLUCOSE BLDC GLUCOMTR-MCNC: 278 MG/DL — HIGH (ref 70–99)
GLUCOSE BLDC GLUCOMTR-MCNC: 279 MG/DL — HIGH (ref 70–99)
GLUCOSE BLDC GLUCOMTR-MCNC: 283 MG/DL — HIGH (ref 70–99)
GLUCOSE BLDC GLUCOMTR-MCNC: 283 MG/DL — HIGH (ref 70–99)
GLUCOSE BLDC GLUCOMTR-MCNC: 284 MG/DL — HIGH (ref 70–99)
GLUCOSE BLDC GLUCOMTR-MCNC: 285 MG/DL — HIGH (ref 70–99)
GLUCOSE BLDC GLUCOMTR-MCNC: 286 MG/DL — HIGH (ref 70–99)
GLUCOSE BLDC GLUCOMTR-MCNC: 288 MG/DL — HIGH (ref 70–99)
GLUCOSE BLDC GLUCOMTR-MCNC: 291 MG/DL — HIGH (ref 70–99)
GLUCOSE BLDC GLUCOMTR-MCNC: 292 MG/DL — HIGH (ref 70–99)
GLUCOSE BLDC GLUCOMTR-MCNC: 293 MG/DL — HIGH (ref 70–99)
GLUCOSE BLDC GLUCOMTR-MCNC: 293 MG/DL — HIGH (ref 70–99)
GLUCOSE BLDC GLUCOMTR-MCNC: 298 MG/DL — HIGH (ref 70–99)
GLUCOSE BLDC GLUCOMTR-MCNC: 300 MG/DL — HIGH (ref 70–99)
GLUCOSE BLDC GLUCOMTR-MCNC: 302 MG/DL — HIGH (ref 70–99)
GLUCOSE BLDC GLUCOMTR-MCNC: 306 MG/DL — HIGH (ref 70–99)
GLUCOSE BLDC GLUCOMTR-MCNC: 307 MG/DL — HIGH (ref 70–99)
GLUCOSE BLDC GLUCOMTR-MCNC: 308 MG/DL — HIGH (ref 70–99)
GLUCOSE BLDC GLUCOMTR-MCNC: 308 MG/DL — HIGH (ref 70–99)
GLUCOSE BLDC GLUCOMTR-MCNC: 312 MG/DL — HIGH (ref 70–99)
GLUCOSE BLDC GLUCOMTR-MCNC: 312 MG/DL — HIGH (ref 70–99)
GLUCOSE BLDC GLUCOMTR-MCNC: 313 MG/DL — HIGH (ref 70–99)
GLUCOSE BLDC GLUCOMTR-MCNC: 318 MG/DL — HIGH (ref 70–99)
GLUCOSE BLDC GLUCOMTR-MCNC: 322 MG/DL — HIGH (ref 70–99)
GLUCOSE BLDC GLUCOMTR-MCNC: 322 MG/DL — HIGH (ref 70–99)
GLUCOSE BLDC GLUCOMTR-MCNC: 324 MG/DL — HIGH (ref 70–99)
GLUCOSE BLDC GLUCOMTR-MCNC: 327 MG/DL — HIGH (ref 70–99)
GLUCOSE BLDC GLUCOMTR-MCNC: 330 MG/DL — HIGH (ref 70–99)
GLUCOSE BLDC GLUCOMTR-MCNC: 331 MG/DL — HIGH (ref 70–99)
GLUCOSE BLDC GLUCOMTR-MCNC: 342 MG/DL — HIGH (ref 70–99)
GLUCOSE BLDC GLUCOMTR-MCNC: 345 MG/DL — HIGH (ref 70–99)
GLUCOSE BLDC GLUCOMTR-MCNC: 352 MG/DL — HIGH (ref 70–99)
GLUCOSE BLDC GLUCOMTR-MCNC: 354 MG/DL — HIGH (ref 70–99)
GLUCOSE BLDC GLUCOMTR-MCNC: 358 MG/DL — HIGH (ref 70–99)
GLUCOSE BLDC GLUCOMTR-MCNC: 363 MG/DL — HIGH (ref 70–99)
GLUCOSE BLDC GLUCOMTR-MCNC: 367 MG/DL — HIGH (ref 70–99)
GLUCOSE BLDC GLUCOMTR-MCNC: 368 MG/DL — HIGH (ref 70–99)
GLUCOSE BLDC GLUCOMTR-MCNC: 368 MG/DL — HIGH (ref 70–99)
GLUCOSE BLDC GLUCOMTR-MCNC: 377 MG/DL — HIGH (ref 70–99)
GLUCOSE BLDC GLUCOMTR-MCNC: 383 MG/DL — HIGH (ref 70–99)
GLUCOSE BLDC GLUCOMTR-MCNC: 397 MG/DL — HIGH (ref 70–99)
GLUCOSE BLDC GLUCOMTR-MCNC: 400 MG/DL — HIGH (ref 70–99)
GLUCOSE BLDC GLUCOMTR-MCNC: 401 MG/DL — HIGH (ref 70–99)
GLUCOSE BLDC GLUCOMTR-MCNC: 408 MG/DL — HIGH (ref 70–99)
GLUCOSE BLDC GLUCOMTR-MCNC: 419 MG/DL — HIGH (ref 70–99)
GLUCOSE BLDC GLUCOMTR-MCNC: 47 MG/DL — CRITICAL LOW (ref 70–99)
GLUCOSE BLDC GLUCOMTR-MCNC: 58 MG/DL — LOW (ref 70–99)
GLUCOSE BLDC GLUCOMTR-MCNC: 59 MG/DL — LOW (ref 70–99)
GLUCOSE BLDC GLUCOMTR-MCNC: 81 MG/DL — SIGNIFICANT CHANGE UP (ref 70–99)
GLUCOSE BLDC GLUCOMTR-MCNC: 81 MG/DL — SIGNIFICANT CHANGE UP (ref 70–99)
GLUCOSE BLDC GLUCOMTR-MCNC: 82 MG/DL — SIGNIFICANT CHANGE UP (ref 70–99)
GLUCOSE BLDC GLUCOMTR-MCNC: 83 MG/DL — SIGNIFICANT CHANGE UP (ref 70–99)
GLUCOSE BLDC GLUCOMTR-MCNC: 83 MG/DL — SIGNIFICANT CHANGE UP (ref 70–99)
GLUCOSE BLDC GLUCOMTR-MCNC: 91 MG/DL — SIGNIFICANT CHANGE UP (ref 70–99)
GLUCOSE BLDC GLUCOMTR-MCNC: 91 MG/DL — SIGNIFICANT CHANGE UP (ref 70–99)
GLUCOSE BLDC GLUCOMTR-MCNC: 94 MG/DL — SIGNIFICANT CHANGE UP (ref 70–99)
GLUCOSE BLDC GLUCOMTR-MCNC: 94 MG/DL — SIGNIFICANT CHANGE UP (ref 70–99)
GLUCOSE BLDC GLUCOMTR-MCNC: 96 MG/DL — SIGNIFICANT CHANGE UP (ref 70–99)
GLUCOSE BLDC GLUCOMTR-MCNC: 96 MG/DL — SIGNIFICANT CHANGE UP (ref 70–99)
GLUCOSE BLDC GLUCOMTR-MCNC: 97 MG/DL — SIGNIFICANT CHANGE UP (ref 70–99)
GLUCOSE BLDC GLUCOMTR-MCNC: 97 MG/DL — SIGNIFICANT CHANGE UP (ref 70–99)
GLUCOSE BLDC GLUCOMTR-MCNC: 98 MG/DL — SIGNIFICANT CHANGE UP (ref 70–99)
GLUCOSE BLDC GLUCOMTR-MCNC: 99 MG/DL — SIGNIFICANT CHANGE UP (ref 70–99)
GLUCOSE BLDC GLUCOMTR-MCNC: 99 MG/DL — SIGNIFICANT CHANGE UP (ref 70–99)
GLUCOSE BLDV-MCNC: 110 MG/DL — HIGH (ref 70–99)
GLUCOSE BLDV-MCNC: 110 MG/DL — HIGH (ref 70–99)
GLUCOSE BLDV-MCNC: 119 MG/DL — HIGH (ref 70–99)
GLUCOSE BLDV-MCNC: 126 MG/DL — HIGH (ref 70–99)
GLUCOSE BLDV-MCNC: 136 MG/DL — HIGH (ref 70–99)
GLUCOSE BLDV-MCNC: 136 MG/DL — HIGH (ref 70–99)
GLUCOSE BLDV-MCNC: 137 MG/DL — HIGH (ref 70–99)
GLUCOSE BLDV-MCNC: 139 MG/DL — HIGH (ref 70–99)
GLUCOSE BLDV-MCNC: 139 MG/DL — HIGH (ref 70–99)
GLUCOSE BLDV-MCNC: 146 MG/DL — HIGH (ref 70–99)
GLUCOSE BLDV-MCNC: 146 MG/DL — HIGH (ref 70–99)
GLUCOSE BLDV-MCNC: 150 MG/DL — HIGH (ref 70–99)
GLUCOSE BLDV-MCNC: 156 MG/DL — HIGH (ref 70–99)
GLUCOSE BLDV-MCNC: 156 MG/DL — HIGH (ref 70–99)
GLUCOSE BLDV-MCNC: 157 MG/DL — HIGH (ref 70–99)
GLUCOSE BLDV-MCNC: 160 MG/DL — HIGH (ref 70–99)
GLUCOSE BLDV-MCNC: 161 MG/DL — HIGH (ref 70–99)
GLUCOSE BLDV-MCNC: 161 MG/DL — HIGH (ref 70–99)
GLUCOSE BLDV-MCNC: 174 MG/DL — HIGH (ref 70–99)
GLUCOSE BLDV-MCNC: 180 MG/DL — HIGH (ref 70–99)
GLUCOSE BLDV-MCNC: 180 MG/DL — HIGH (ref 70–99)
GLUCOSE BLDV-MCNC: 192 MG/DL — HIGH (ref 70–99)
GLUCOSE BLDV-MCNC: 200 MG/DL — HIGH (ref 70–99)
GLUCOSE BLDV-MCNC: 226 MG/DL — HIGH (ref 70–99)
GLUCOSE BLDV-MCNC: 227 MG/DL — HIGH (ref 70–99)
GLUCOSE BLDV-MCNC: 229 MG/DL — HIGH (ref 70–99)
GLUCOSE BLDV-MCNC: 260 MG/DL — HIGH (ref 70–99)
GLUCOSE BLDV-MCNC: 264 MG/DL — HIGH (ref 70–99)
GLUCOSE BLDV-MCNC: 265 MG/DL — HIGH (ref 70–99)
GLUCOSE BLDV-MCNC: 325 MG/DL — HIGH (ref 70–99)
GLUCOSE FLD-MCNC: 181 MG/DL — SIGNIFICANT CHANGE UP
GLUCOSE FLD-MCNC: 181 MG/DL — SIGNIFICANT CHANGE UP
GLUCOSE SERPL-MCNC: 101 MG/DL — HIGH (ref 70–99)
GLUCOSE SERPL-MCNC: 101 MG/DL — HIGH (ref 70–99)
GLUCOSE SERPL-MCNC: 113 MG/DL — HIGH (ref 70–99)
GLUCOSE SERPL-MCNC: 113 MG/DL — HIGH (ref 70–99)
GLUCOSE SERPL-MCNC: 117 MG/DL — HIGH (ref 70–99)
GLUCOSE SERPL-MCNC: 117 MG/DL — HIGH (ref 70–99)
GLUCOSE SERPL-MCNC: 120 MG/DL — HIGH (ref 70–99)
GLUCOSE SERPL-MCNC: 124 MG/DL — HIGH (ref 70–99)
GLUCOSE SERPL-MCNC: 126 MG/DL — HIGH (ref 70–99)
GLUCOSE SERPL-MCNC: 127 MG/DL — HIGH (ref 70–99)
GLUCOSE SERPL-MCNC: 127 MG/DL — HIGH (ref 70–99)
GLUCOSE SERPL-MCNC: 130 MG/DL — HIGH (ref 70–99)
GLUCOSE SERPL-MCNC: 130 MG/DL — HIGH (ref 70–99)
GLUCOSE SERPL-MCNC: 131 MG/DL — HIGH (ref 70–99)
GLUCOSE SERPL-MCNC: 131 MG/DL — HIGH (ref 70–99)
GLUCOSE SERPL-MCNC: 132 MG/DL — HIGH (ref 70–99)
GLUCOSE SERPL-MCNC: 135 MG/DL — HIGH (ref 70–99)
GLUCOSE SERPL-MCNC: 135 MG/DL — HIGH (ref 70–99)
GLUCOSE SERPL-MCNC: 138 MG/DL — HIGH (ref 70–99)
GLUCOSE SERPL-MCNC: 138 MG/DL — HIGH (ref 70–99)
GLUCOSE SERPL-MCNC: 140 MG/DL — HIGH (ref 70–99)
GLUCOSE SERPL-MCNC: 140 MG/DL — HIGH (ref 70–99)
GLUCOSE SERPL-MCNC: 141 MG/DL — HIGH (ref 70–99)
GLUCOSE SERPL-MCNC: 141 MG/DL — HIGH (ref 70–99)
GLUCOSE SERPL-MCNC: 143 MG/DL — HIGH (ref 70–99)
GLUCOSE SERPL-MCNC: 143 MG/DL — HIGH (ref 70–99)
GLUCOSE SERPL-MCNC: 144 MG/DL — HIGH (ref 70–99)
GLUCOSE SERPL-MCNC: 146 MG/DL — HIGH (ref 70–99)
GLUCOSE SERPL-MCNC: 146 MG/DL — HIGH (ref 70–99)
GLUCOSE SERPL-MCNC: 147 MG/DL — HIGH (ref 70–99)
GLUCOSE SERPL-MCNC: 148 MG/DL — HIGH (ref 70–99)
GLUCOSE SERPL-MCNC: 149 MG/DL — HIGH (ref 70–99)
GLUCOSE SERPL-MCNC: 150 MG/DL — HIGH (ref 70–99)
GLUCOSE SERPL-MCNC: 150 MG/DL — HIGH (ref 70–99)
GLUCOSE SERPL-MCNC: 151 MG/DL — HIGH (ref 70–99)
GLUCOSE SERPL-MCNC: 152 MG/DL — HIGH (ref 70–99)
GLUCOSE SERPL-MCNC: 152 MG/DL — HIGH (ref 70–99)
GLUCOSE SERPL-MCNC: 154 MG/DL — HIGH (ref 70–99)
GLUCOSE SERPL-MCNC: 155 MG/DL — HIGH (ref 70–99)
GLUCOSE SERPL-MCNC: 158 MG/DL — HIGH (ref 70–99)
GLUCOSE SERPL-MCNC: 158 MG/DL — HIGH (ref 70–99)
GLUCOSE SERPL-MCNC: 161 MG/DL — HIGH (ref 70–99)
GLUCOSE SERPL-MCNC: 161 MG/DL — HIGH (ref 70–99)
GLUCOSE SERPL-MCNC: 163 MG/DL — HIGH (ref 70–99)
GLUCOSE SERPL-MCNC: 163 MG/DL — HIGH (ref 70–99)
GLUCOSE SERPL-MCNC: 164 MG/DL — HIGH (ref 70–99)
GLUCOSE SERPL-MCNC: 164 MG/DL — HIGH (ref 70–99)
GLUCOSE SERPL-MCNC: 165 MG/DL — HIGH (ref 70–99)
GLUCOSE SERPL-MCNC: 165 MG/DL — HIGH (ref 70–99)
GLUCOSE SERPL-MCNC: 166 MG/DL — HIGH (ref 70–99)
GLUCOSE SERPL-MCNC: 167 MG/DL — HIGH (ref 70–99)
GLUCOSE SERPL-MCNC: 168 MG/DL — HIGH (ref 70–99)
GLUCOSE SERPL-MCNC: 168 MG/DL — HIGH (ref 70–99)
GLUCOSE SERPL-MCNC: 172 MG/DL — HIGH (ref 70–99)
GLUCOSE SERPL-MCNC: 173 MG/DL — HIGH (ref 70–99)
GLUCOSE SERPL-MCNC: 173 MG/DL — HIGH (ref 70–99)
GLUCOSE SERPL-MCNC: 174 MG/DL — HIGH (ref 70–99)
GLUCOSE SERPL-MCNC: 175 MG/DL — HIGH (ref 70–99)
GLUCOSE SERPL-MCNC: 176 MG/DL — HIGH (ref 70–99)
GLUCOSE SERPL-MCNC: 176 MG/DL — HIGH (ref 70–99)
GLUCOSE SERPL-MCNC: 177 MG/DL — HIGH (ref 70–99)
GLUCOSE SERPL-MCNC: 178 MG/DL — HIGH (ref 70–99)
GLUCOSE SERPL-MCNC: 179 MG/DL — HIGH (ref 70–99)
GLUCOSE SERPL-MCNC: 181 MG/DL — HIGH (ref 70–99)
GLUCOSE SERPL-MCNC: 182 MG/DL — HIGH (ref 70–99)
GLUCOSE SERPL-MCNC: 184 MG/DL — HIGH (ref 70–99)
GLUCOSE SERPL-MCNC: 185 MG/DL — HIGH (ref 70–99)
GLUCOSE SERPL-MCNC: 185 MG/DL — HIGH (ref 70–99)
GLUCOSE SERPL-MCNC: 186 MG/DL — HIGH (ref 70–99)
GLUCOSE SERPL-MCNC: 188 MG/DL — HIGH (ref 70–99)
GLUCOSE SERPL-MCNC: 193 MG/DL — HIGH (ref 70–99)
GLUCOSE SERPL-MCNC: 193 MG/DL — HIGH (ref 70–99)
GLUCOSE SERPL-MCNC: 194 MG/DL — HIGH (ref 70–99)
GLUCOSE SERPL-MCNC: 196 MG/DL — HIGH (ref 70–99)
GLUCOSE SERPL-MCNC: 197 MG/DL — HIGH (ref 70–99)
GLUCOSE SERPL-MCNC: 197 MG/DL — HIGH (ref 70–99)
GLUCOSE SERPL-MCNC: 198 MG/DL — HIGH (ref 70–99)
GLUCOSE SERPL-MCNC: 200 MG/DL — HIGH (ref 70–99)
GLUCOSE SERPL-MCNC: 204 MG/DL — HIGH (ref 70–99)
GLUCOSE SERPL-MCNC: 205 MG/DL — HIGH (ref 70–99)
GLUCOSE SERPL-MCNC: 209 MG/DL — HIGH (ref 70–99)
GLUCOSE SERPL-MCNC: 210 MG/DL — HIGH (ref 70–99)
GLUCOSE SERPL-MCNC: 211 MG/DL — HIGH (ref 70–99)
GLUCOSE SERPL-MCNC: 213 MG/DL — HIGH (ref 70–99)
GLUCOSE SERPL-MCNC: 213 MG/DL — HIGH (ref 70–99)
GLUCOSE SERPL-MCNC: 216 MG/DL — HIGH (ref 70–99)
GLUCOSE SERPL-MCNC: 216 MG/DL — HIGH (ref 70–99)
GLUCOSE SERPL-MCNC: 222 MG/DL — HIGH (ref 70–99)
GLUCOSE SERPL-MCNC: 224 MG/DL — HIGH (ref 70–99)
GLUCOSE SERPL-MCNC: 224 MG/DL — HIGH (ref 70–99)
GLUCOSE SERPL-MCNC: 227 MG/DL — HIGH (ref 70–99)
GLUCOSE SERPL-MCNC: 227 MG/DL — HIGH (ref 70–99)
GLUCOSE SERPL-MCNC: 228 MG/DL — HIGH (ref 70–99)
GLUCOSE SERPL-MCNC: 228 MG/DL — HIGH (ref 70–99)
GLUCOSE SERPL-MCNC: 229 MG/DL — HIGH (ref 70–99)
GLUCOSE SERPL-MCNC: 230 MG/DL — HIGH (ref 70–99)
GLUCOSE SERPL-MCNC: 231 MG/DL — HIGH (ref 70–99)
GLUCOSE SERPL-MCNC: 232 MG/DL — HIGH (ref 70–99)
GLUCOSE SERPL-MCNC: 233 MG/DL — HIGH (ref 70–99)
GLUCOSE SERPL-MCNC: 233 MG/DL — HIGH (ref 70–99)
GLUCOSE SERPL-MCNC: 235 MG/DL — HIGH (ref 70–99)
GLUCOSE SERPL-MCNC: 238 MG/DL — HIGH (ref 70–99)
GLUCOSE SERPL-MCNC: 239 MG/DL — HIGH (ref 70–99)
GLUCOSE SERPL-MCNC: 240 MG/DL — HIGH (ref 70–99)
GLUCOSE SERPL-MCNC: 244 MG/DL — HIGH (ref 70–99)
GLUCOSE SERPL-MCNC: 250 MG/DL — HIGH (ref 70–99)
GLUCOSE SERPL-MCNC: 251 MG/DL — HIGH (ref 70–99)
GLUCOSE SERPL-MCNC: 255 MG/DL — HIGH (ref 70–99)
GLUCOSE SERPL-MCNC: 256 MG/DL — HIGH (ref 70–99)
GLUCOSE SERPL-MCNC: 257 MG/DL — HIGH (ref 70–99)
GLUCOSE SERPL-MCNC: 260 MG/DL — HIGH (ref 70–99)
GLUCOSE SERPL-MCNC: 261 MG/DL — HIGH (ref 70–99)
GLUCOSE SERPL-MCNC: 263 MG/DL — HIGH (ref 70–99)
GLUCOSE SERPL-MCNC: 271 MG/DL — HIGH (ref 70–99)
GLUCOSE SERPL-MCNC: 289 MG/DL — HIGH (ref 70–99)
GLUCOSE SERPL-MCNC: 291 MG/DL — HIGH (ref 70–99)
GLUCOSE SERPL-MCNC: 292 MG/DL — HIGH (ref 70–99)
GLUCOSE SERPL-MCNC: 295 MG/DL — HIGH (ref 70–99)
GLUCOSE SERPL-MCNC: 302 MG/DL — HIGH (ref 70–99)
GLUCOSE SERPL-MCNC: 305 MG/DL — HIGH (ref 70–99)
GLUCOSE SERPL-MCNC: 305 MG/DL — HIGH (ref 70–99)
GLUCOSE SERPL-MCNC: 306 MG/DL — HIGH (ref 70–99)
GLUCOSE SERPL-MCNC: 309 MG/DL — HIGH (ref 70–99)
GLUCOSE SERPL-MCNC: 334 MG/DL — HIGH (ref 70–99)
GLUCOSE SERPL-MCNC: 340 MG/DL — HIGH (ref 70–99)
GLUCOSE SERPL-MCNC: 348 MG/DL — HIGH (ref 70–99)
GLUCOSE SERPL-MCNC: 357 MG/DL — HIGH (ref 70–99)
GLUCOSE SERPL-MCNC: 357 MG/DL — HIGH (ref 70–99)
GLUCOSE SERPL-MCNC: 359 MG/DL — HIGH (ref 70–99)
GLUCOSE SERPL-MCNC: 402 MG/DL — HIGH (ref 70–99)
GLUCOSE SERPL-MCNC: 99 MG/DL — SIGNIFICANT CHANGE UP (ref 70–99)
GLUCOSE SERPL-MCNC: 99 MG/DL — SIGNIFICANT CHANGE UP (ref 70–99)
GLUCOSE UR QL: 500 MG/DL
GLUCOSE UR QL: ABNORMAL
GLUCOSE UR QL: NEGATIVE MG/DL — SIGNIFICANT CHANGE UP
GRAM STN FLD: ABNORMAL
GRAM STN FLD: SIGNIFICANT CHANGE UP
HADV DNA SPEC QL NAA+PROBE: SIGNIFICANT CHANGE UP
HAPTOGLOB SERPL-MCNC: 226 MG/DL — HIGH (ref 34–200)
HBV CORE AB SER-ACNC: SIGNIFICANT CHANGE UP
HBV DNA # SERPL NAA+PROBE: SIGNIFICANT CHANGE UP
HBV DNA SERPL NAA+PROBE-LOG#: SIGNIFICANT CHANGE UP LOGIU/ML
HBV SURFACE AB SER-ACNC: <3 MIU/ML — LOW
HBV SURFACE AB SER-ACNC: <3 MIU/ML — LOW
HBV SURFACE AB SER-ACNC: SIGNIFICANT CHANGE UP
HBV SURFACE AG SER-ACNC: SIGNIFICANT CHANGE UP
HCO3 BLDA-SCNC: 19 MMOL/L — LOW (ref 21–28)
HCO3 BLDA-SCNC: 24 MMOL/L — SIGNIFICANT CHANGE UP (ref 21–28)
HCO3 BLDA-SCNC: 26 MMOL/L — SIGNIFICANT CHANGE UP (ref 21–28)
HCO3 BLDA-SCNC: 28 MMOL/L — SIGNIFICANT CHANGE UP (ref 21–28)
HCO3 BLDA-SCNC: 29 MMOL/L — HIGH (ref 21–28)
HCO3 BLDA-SCNC: 31 MMOL/L — HIGH (ref 21–28)
HCO3 BLDV-SCNC: 23 MMOL/L — SIGNIFICANT CHANGE UP (ref 22–29)
HCO3 BLDV-SCNC: 24 MMOL/L — SIGNIFICANT CHANGE UP (ref 22–29)
HCO3 BLDV-SCNC: 25 MMOL/L — SIGNIFICANT CHANGE UP (ref 22–29)
HCO3 BLDV-SCNC: 26 MMOL/L — SIGNIFICANT CHANGE UP (ref 22–29)
HCO3 BLDV-SCNC: 27 MMOL/L — SIGNIFICANT CHANGE UP (ref 22–29)
HCO3 BLDV-SCNC: 27 MMOL/L — SIGNIFICANT CHANGE UP (ref 22–29)
HCO3 BLDV-SCNC: 28 MMOL/L — SIGNIFICANT CHANGE UP (ref 22–29)
HCO3 BLDV-SCNC: 29 MMOL/L — SIGNIFICANT CHANGE UP (ref 22–29)
HCO3 BLDV-SCNC: 30 MMOL/L — HIGH (ref 22–29)
HCO3 BLDV-SCNC: 31 MMOL/L — HIGH (ref 22–29)
HCO3 BLDV-SCNC: 37 MMOL/L — HIGH (ref 22–29)
HCOV 229E RNA SPEC QL NAA+PROBE: SIGNIFICANT CHANGE UP
HCOV HKU1 RNA SPEC QL NAA+PROBE: SIGNIFICANT CHANGE UP
HCOV NL63 RNA SPEC QL NAA+PROBE: SIGNIFICANT CHANGE UP
HCOV OC43 RNA SPEC QL NAA+PROBE: SIGNIFICANT CHANGE UP
HCT VFR BLD CALC: 19 % — CRITICAL LOW (ref 34.5–45)
HCT VFR BLD CALC: 19 % — CRITICAL LOW (ref 34.5–45)
HCT VFR BLD CALC: 19.8 % — CRITICAL LOW (ref 34.5–45)
HCT VFR BLD CALC: 20.4 % — CRITICAL LOW (ref 34.5–45)
HCT VFR BLD CALC: 20.9 % — CRITICAL LOW (ref 34.5–45)
HCT VFR BLD CALC: 21 % — CRITICAL LOW (ref 34.5–45)
HCT VFR BLD CALC: 21 % — CRITICAL LOW (ref 34.5–45)
HCT VFR BLD CALC: 21.2 % — LOW (ref 34.5–45)
HCT VFR BLD CALC: 21.2 % — LOW (ref 34.5–45)
HCT VFR BLD CALC: 21.3 % — LOW (ref 34.5–45)
HCT VFR BLD CALC: 21.4 % — LOW (ref 34.5–45)
HCT VFR BLD CALC: 21.5 % — LOW (ref 34.5–45)
HCT VFR BLD CALC: 21.5 % — LOW (ref 34.5–45)
HCT VFR BLD CALC: 21.6 % — LOW (ref 34.5–45)
HCT VFR BLD CALC: 21.7 % — LOW (ref 34.5–45)
HCT VFR BLD CALC: 21.7 % — LOW (ref 34.5–45)
HCT VFR BLD CALC: 21.8 % — LOW (ref 34.5–45)
HCT VFR BLD CALC: 21.9 % — LOW (ref 34.5–45)
HCT VFR BLD CALC: 22 % — LOW (ref 34.5–45)
HCT VFR BLD CALC: 22 % — LOW (ref 34.5–45)
HCT VFR BLD CALC: 22.1 % — LOW (ref 34.5–45)
HCT VFR BLD CALC: 22.4 % — LOW (ref 34.5–45)
HCT VFR BLD CALC: 22.5 % — LOW (ref 34.5–45)
HCT VFR BLD CALC: 22.5 % — LOW (ref 34.5–45)
HCT VFR BLD CALC: 22.6 % — LOW (ref 34.5–45)
HCT VFR BLD CALC: 22.6 % — LOW (ref 34.5–45)
HCT VFR BLD CALC: 22.7 % — LOW (ref 34.5–45)
HCT VFR BLD CALC: 22.8 % — LOW (ref 34.5–45)
HCT VFR BLD CALC: 22.8 % — LOW (ref 34.5–45)
HCT VFR BLD CALC: 22.9 % — LOW (ref 34.5–45)
HCT VFR BLD CALC: 22.9 % — LOW (ref 34.5–45)
HCT VFR BLD CALC: 23 % — LOW (ref 34.5–45)
HCT VFR BLD CALC: 23.2 % — LOW (ref 34.5–45)
HCT VFR BLD CALC: 23.3 % — LOW (ref 34.5–45)
HCT VFR BLD CALC: 23.4 % — LOW (ref 34.5–45)
HCT VFR BLD CALC: 23.5 % — LOW (ref 34.5–45)
HCT VFR BLD CALC: 23.6 % — LOW (ref 34.5–45)
HCT VFR BLD CALC: 23.7 % — LOW (ref 34.5–45)
HCT VFR BLD CALC: 23.8 % — LOW (ref 34.5–45)
HCT VFR BLD CALC: 23.9 % — LOW (ref 34.5–45)
HCT VFR BLD CALC: 24 % — LOW (ref 34.5–45)
HCT VFR BLD CALC: 24.1 % — LOW (ref 34.5–45)
HCT VFR BLD CALC: 24.2 % — LOW (ref 34.5–45)
HCT VFR BLD CALC: 24.2 % — LOW (ref 34.5–45)
HCT VFR BLD CALC: 24.3 % — LOW (ref 34.5–45)
HCT VFR BLD CALC: 24.5 % — LOW (ref 34.5–45)
HCT VFR BLD CALC: 24.6 % — LOW (ref 34.5–45)
HCT VFR BLD CALC: 24.7 % — LOW (ref 34.5–45)
HCT VFR BLD CALC: 24.8 % — LOW (ref 34.5–45)
HCT VFR BLD CALC: 25 % — LOW (ref 34.5–45)
HCT VFR BLD CALC: 25.1 % — LOW (ref 34.5–45)
HCT VFR BLD CALC: 25.1 % — LOW (ref 34.5–45)
HCT VFR BLD CALC: 25.2 % — LOW (ref 34.5–45)
HCT VFR BLD CALC: 25.3 % — LOW (ref 34.5–45)
HCT VFR BLD CALC: 25.4 % — LOW (ref 34.5–45)
HCT VFR BLD CALC: 25.4 % — LOW (ref 34.5–45)
HCT VFR BLD CALC: 25.5 % — LOW (ref 34.5–45)
HCT VFR BLD CALC: 25.6 % — LOW (ref 34.5–45)
HCT VFR BLD CALC: 25.7 % — LOW (ref 34.5–45)
HCT VFR BLD CALC: 25.7 % — LOW (ref 34.5–45)
HCT VFR BLD CALC: 25.8 % — LOW (ref 34.5–45)
HCT VFR BLD CALC: 26.1 % — LOW (ref 34.5–45)
HCT VFR BLD CALC: 26.2 % — LOW (ref 34.5–45)
HCT VFR BLD CALC: 26.3 % — LOW (ref 34.5–45)
HCT VFR BLD CALC: 26.4 % — LOW (ref 34.5–45)
HCT VFR BLD CALC: 26.4 % — LOW (ref 34.5–45)
HCT VFR BLD CALC: 26.5 % — LOW (ref 34.5–45)
HCT VFR BLD CALC: 26.5 % — LOW (ref 34.5–45)
HCT VFR BLD CALC: 26.7 % — LOW (ref 34.5–45)
HCT VFR BLD CALC: 26.8 % — LOW (ref 34.5–45)
HCT VFR BLD CALC: 26.8 % — LOW (ref 34.5–45)
HCT VFR BLD CALC: 27 % — LOW (ref 34.5–45)
HCT VFR BLD CALC: 27.1 % — LOW (ref 34.5–45)
HCT VFR BLD CALC: 27.1 % — LOW (ref 34.5–45)
HCT VFR BLD CALC: 27.2 % — LOW (ref 34.5–45)
HCT VFR BLD CALC: 27.4 % — LOW (ref 34.5–45)
HCT VFR BLD CALC: 27.5 % — LOW (ref 34.5–45)
HCT VFR BLD CALC: 27.5 % — LOW (ref 34.5–45)
HCT VFR BLD CALC: 27.6 % — LOW (ref 34.5–45)
HCT VFR BLD CALC: 27.7 % — LOW (ref 34.5–45)
HCT VFR BLD CALC: 28 % — LOW (ref 34.5–45)
HCT VFR BLD CALC: 28.2 % — LOW (ref 34.5–45)
HCT VFR BLD CALC: 28.2 % — LOW (ref 34.5–45)
HCT VFR BLD CALC: 28.4 % — LOW (ref 34.5–45)
HCT VFR BLD CALC: 28.4 % — LOW (ref 34.5–45)
HCT VFR BLD CALC: 29 % — LOW (ref 34.5–45)
HCT VFR BLD CALC: 29.1 % — LOW (ref 34.5–45)
HCT VFR BLD CALC: 29.2 % — LOW (ref 34.5–45)
HCT VFR BLD CALC: 29.2 % — LOW (ref 34.5–45)
HCT VFR BLD CALC: 29.3 % — LOW (ref 34.5–45)
HCT VFR BLD CALC: 29.5 % — LOW (ref 34.5–45)
HCT VFR BLD CALC: 29.6 % — LOW (ref 34.5–45)
HCT VFR BLD CALC: 29.6 % — LOW (ref 34.5–45)
HCT VFR BLD CALC: 29.7 % — LOW (ref 34.5–45)
HCT VFR BLD CALC: 29.8 % — LOW (ref 34.5–45)
HCT VFR BLD CALC: 29.8 % — LOW (ref 34.5–45)
HCT VFR BLD CALC: 30 % — LOW (ref 34.5–45)
HCT VFR BLD CALC: 30 % — LOW (ref 34.5–45)
HCT VFR BLD CALC: 30.1 % — LOW (ref 34.5–45)
HCT VFR BLD CALC: 30.2 % — LOW (ref 34.5–45)
HCT VFR BLD CALC: 30.3 % — LOW (ref 34.5–45)
HCT VFR BLD CALC: 30.4 % — LOW (ref 34.5–45)
HCT VFR BLD CALC: 30.6 % — LOW (ref 34.5–45)
HCT VFR BLD CALC: 30.6 % — LOW (ref 34.5–45)
HCT VFR BLD CALC: 30.7 % — LOW (ref 34.5–45)
HCT VFR BLD CALC: 30.7 % — LOW (ref 34.5–45)
HCT VFR BLD CALC: 30.9 % — LOW (ref 34.5–45)
HCT VFR BLD CALC: 31 % — LOW (ref 34.5–45)
HCT VFR BLD CALC: 31.2 % — LOW (ref 34.5–45)
HCT VFR BLD CALC: 31.3 % — LOW (ref 34.5–45)
HCT VFR BLD CALC: 31.3 % — LOW (ref 34.5–45)
HCT VFR BLD CALC: 31.4 % — LOW (ref 34.5–45)
HCT VFR BLD CALC: 31.7 % — LOW (ref 34.5–45)
HCT VFR BLD CALC: 31.7 % — LOW (ref 34.5–45)
HCT VFR BLD CALC: 31.9 % — LOW (ref 34.5–45)
HCT VFR BLD CALC: 32.1 % — LOW (ref 34.5–45)
HCT VFR BLD CALC: 32.5 % — LOW (ref 34.5–45)
HCT VFR BLD CALC: 32.7 % — LOW (ref 34.5–45)
HCT VFR BLD CALC: 32.7 % — LOW (ref 34.5–45)
HCT VFR BLD CALC: 33.1 % — LOW (ref 34.5–45)
HCT VFR BLD CALC: 33.1 % — LOW (ref 34.5–45)
HCT VFR BLDA CALC: 20 % — CRITICAL LOW (ref 34.5–46.5)
HCT VFR BLDA CALC: 21 % — CRITICAL LOW (ref 34.5–46.5)
HCT VFR BLDA CALC: 22 % — LOW (ref 34.5–46.5)
HCT VFR BLDA CALC: 23 % — LOW (ref 34.5–46.5)
HCT VFR BLDA CALC: 24 % — LOW (ref 34.5–46.5)
HCT VFR BLDA CALC: 25 % — LOW (ref 34.5–46.5)
HCT VFR BLDA CALC: 26 % — LOW (ref 34.5–46.5)
HCT VFR BLDA CALC: 27 % — LOW (ref 34.5–46.5)
HCT VFR BLDA CALC: 28 % — LOW (ref 34.5–46.5)
HCT VFR BLDA CALC: 29 % — LOW (ref 34.5–46.5)
HCT VFR BLDA CALC: 31 % — LOW (ref 34.5–46.5)
HCV AB S/CO SERPL IA: 0.07 S/CO — SIGNIFICANT CHANGE UP (ref 0–0.99)
HCV AB S/CO SERPL IA: 0.17 S/CO — SIGNIFICANT CHANGE UP (ref 0–0.99)
HCV AB SERPL-IMP: SIGNIFICANT CHANGE UP
HCV AB SERPL-IMP: SIGNIFICANT CHANGE UP
HCV RNA SPEC NAA+PROBE-LOG IU: SIGNIFICANT CHANGE UP
HCV RNA SPEC NAA+PROBE-LOG IU: SIGNIFICANT CHANGE UP LOGIU/ML
HDLC SERPL-MCNC: 30 MG/DL — LOW
HDV IGM SER QL IA: SIGNIFICANT CHANGE UP
HGB BLD CALC-MCNC: 10.3 G/DL — LOW (ref 11.7–16.1)
HGB BLD CALC-MCNC: 6.5 G/DL — CRITICAL LOW (ref 11.7–16.1)
HGB BLD CALC-MCNC: 7 G/DL — CRITICAL LOW (ref 11.7–16.1)
HGB BLD CALC-MCNC: 7.4 G/DL — LOW (ref 11.7–16.1)
HGB BLD CALC-MCNC: 7.5 G/DL — LOW (ref 11.7–16.1)
HGB BLD CALC-MCNC: 7.5 G/DL — LOW (ref 11.7–16.1)
HGB BLD CALC-MCNC: 7.8 G/DL — LOW (ref 11.7–16.1)
HGB BLD CALC-MCNC: 7.9 G/DL — LOW (ref 11.7–16.1)
HGB BLD CALC-MCNC: 8.2 G/DL — LOW (ref 11.7–16.1)
HGB BLD CALC-MCNC: 8.3 G/DL — LOW (ref 11.7–16.1)
HGB BLD CALC-MCNC: 8.3 G/DL — LOW (ref 11.7–16.1)
HGB BLD CALC-MCNC: 8.5 G/DL — LOW (ref 11.7–16.1)
HGB BLD CALC-MCNC: 8.5 G/DL — LOW (ref 11.7–16.1)
HGB BLD CALC-MCNC: 8.7 G/DL — LOW (ref 11.7–16.1)
HGB BLD CALC-MCNC: 8.8 G/DL — LOW (ref 11.7–16.1)
HGB BLD CALC-MCNC: 8.9 G/DL — LOW (ref 11.7–16.1)
HGB BLD CALC-MCNC: 9.1 G/DL — LOW (ref 11.7–16.1)
HGB BLD CALC-MCNC: 9.3 G/DL — LOW (ref 11.7–16.1)
HGB BLD CALC-MCNC: 9.7 G/DL — LOW (ref 11.7–16.1)
HGB BLD-MCNC: 6 G/DL — CRITICAL LOW (ref 11.5–15.5)
HGB BLD-MCNC: 6.2 G/DL — CRITICAL LOW (ref 11.5–15.5)
HGB BLD-MCNC: 6.3 G/DL — CRITICAL LOW (ref 11.5–15.5)
HGB BLD-MCNC: 6.4 G/DL — CRITICAL LOW (ref 11.5–15.5)
HGB BLD-MCNC: 6.5 G/DL — CRITICAL LOW (ref 11.5–15.5)
HGB BLD-MCNC: 6.5 G/DL — CRITICAL LOW (ref 11.5–15.5)
HGB BLD-MCNC: 6.6 G/DL — CRITICAL LOW (ref 11.5–15.5)
HGB BLD-MCNC: 6.7 G/DL — CRITICAL LOW (ref 11.5–15.5)
HGB BLD-MCNC: 6.8 G/DL — CRITICAL LOW (ref 11.5–15.5)
HGB BLD-MCNC: 6.9 G/DL — CRITICAL LOW (ref 11.5–15.5)
HGB BLD-MCNC: 6.9 G/DL — CRITICAL LOW (ref 11.5–15.5)
HGB BLD-MCNC: 7 G/DL — CRITICAL LOW (ref 11.5–15.5)
HGB BLD-MCNC: 7.1 G/DL — LOW (ref 11.5–15.5)
HGB BLD-MCNC: 7.2 G/DL — LOW (ref 11.5–15.5)
HGB BLD-MCNC: 7.3 G/DL — LOW (ref 11.5–15.5)
HGB BLD-MCNC: 7.3 G/DL — LOW (ref 11.5–15.5)
HGB BLD-MCNC: 7.4 G/DL — LOW (ref 11.5–15.5)
HGB BLD-MCNC: 7.5 G/DL — LOW (ref 11.5–15.5)
HGB BLD-MCNC: 7.6 G/DL — LOW (ref 11.5–15.5)
HGB BLD-MCNC: 7.7 G/DL — LOW (ref 11.5–15.5)
HGB BLD-MCNC: 7.8 G/DL — LOW (ref 11.5–15.5)
HGB BLD-MCNC: 7.9 G/DL — LOW (ref 11.5–15.5)
HGB BLD-MCNC: 8 G/DL — LOW (ref 11.5–15.5)
HGB BLD-MCNC: 8.1 G/DL — LOW (ref 11.5–15.5)
HGB BLD-MCNC: 8.2 G/DL — LOW (ref 11.5–15.5)
HGB BLD-MCNC: 8.3 G/DL — LOW (ref 11.5–15.5)
HGB BLD-MCNC: 8.4 G/DL — LOW (ref 11.5–15.5)
HGB BLD-MCNC: 8.5 G/DL — LOW (ref 11.5–15.5)
HGB BLD-MCNC: 8.6 G/DL — LOW (ref 11.5–15.5)
HGB BLD-MCNC: 8.7 G/DL — LOW (ref 11.5–15.5)
HGB BLD-MCNC: 8.8 G/DL — LOW (ref 11.5–15.5)
HGB BLD-MCNC: 8.9 G/DL — LOW (ref 11.5–15.5)
HGB BLD-MCNC: 9 G/DL — LOW (ref 11.5–15.5)
HGB BLD-MCNC: 9.1 G/DL — LOW (ref 11.5–15.5)
HGB BLD-MCNC: 9.2 G/DL — LOW (ref 11.5–15.5)
HGB BLD-MCNC: 9.2 G/DL — LOW (ref 11.5–15.5)
HGB BLD-MCNC: 9.3 G/DL — LOW (ref 11.5–15.5)
HGB BLD-MCNC: 9.4 G/DL — LOW (ref 11.5–15.5)
HGB BLD-MCNC: 9.4 G/DL — LOW (ref 11.5–15.5)
HGB BLD-MCNC: 9.5 G/DL — LOW (ref 11.5–15.5)
HGB BLD-MCNC: 9.5 G/DL — LOW (ref 11.5–15.5)
HGB BLD-MCNC: 9.6 G/DL — LOW (ref 11.5–15.5)
HGB BLD-MCNC: 9.6 G/DL — LOW (ref 11.5–15.5)
HIV 1+2 AB+HIV1 P24 AG SERPL QL IA: SIGNIFICANT CHANGE UP
HMPV RNA SPEC QL NAA+PROBE: SIGNIFICANT CHANGE UP
HOROWITZ INDEX BLDA+IHG-RTO: 28 — SIGNIFICANT CHANGE UP
HOROWITZ INDEX BLDA+IHG-RTO: 30 — SIGNIFICANT CHANGE UP
HOROWITZ INDEX BLDA+IHG-RTO: 35 — SIGNIFICANT CHANGE UP
HOROWITZ INDEX BLDA+IHG-RTO: 40 — SIGNIFICANT CHANGE UP
HOROWITZ INDEX BLDA+IHG-RTO: 50 — SIGNIFICANT CHANGE UP
HOROWITZ INDEX BLDA+IHG-RTO: 50 — SIGNIFICANT CHANGE UP
HOROWITZ INDEX BLDA+IHG-RTO: 60 — SIGNIFICANT CHANGE UP
HOROWITZ INDEX BLDA+IHG-RTO: 80 — SIGNIFICANT CHANGE UP
HOROWITZ INDEX BLDA+IHG-RTO: 80 — SIGNIFICANT CHANGE UP
HPIV1 RNA SPEC QL NAA+PROBE: SIGNIFICANT CHANGE UP
HPIV2 RNA SPEC QL NAA+PROBE: SIGNIFICANT CHANGE UP
HPIV3 RNA SPEC QL NAA+PROBE: SIGNIFICANT CHANGE UP
HPIV4 RNA SPEC QL NAA+PROBE: SIGNIFICANT CHANGE UP
HYALINE CASTS # UR AUTO: 1 /LPF — SIGNIFICANT CHANGE UP (ref 0–2)
HYPOCHROMIA BLD QL: SIGNIFICANT CHANGE UP
HYPOCHROMIA BLD QL: SLIGHT — SIGNIFICANT CHANGE UP
HYPOCHROMIA BLD QL: SLIGHT — SIGNIFICANT CHANGE UP
IANC: 10.05 K/UL — HIGH (ref 1.8–7.4)
IANC: 10.05 K/UL — HIGH (ref 1.8–7.4)
IANC: 10.07 K/UL — HIGH (ref 1.8–7.4)
IANC: 10.08 K/UL — HIGH (ref 1.8–7.4)
IANC: 10.08 K/UL — HIGH (ref 1.8–7.4)
IANC: 10.1 K/UL — HIGH (ref 1.8–7.4)
IANC: 10.1 K/UL — HIGH (ref 1.8–7.4)
IANC: 10.2 K/UL — HIGH (ref 1.8–7.4)
IANC: 10.2 K/UL — HIGH (ref 1.8–7.4)
IANC: 10.41 K/UL — HIGH (ref 1.8–7.4)
IANC: 10.44 K/UL — HIGH (ref 1.8–7.4)
IANC: 10.44 K/UL — HIGH (ref 1.8–7.4)
IANC: 10.5 K/UL — HIGH (ref 1.8–7.4)
IANC: 10.5 K/UL — HIGH (ref 1.8–7.4)
IANC: 10.51 K/UL — HIGH (ref 1.8–7.4)
IANC: 10.51 K/UL — HIGH (ref 1.8–7.4)
IANC: 10.58 K/UL — HIGH (ref 1.8–7.4)
IANC: 10.58 K/UL — HIGH (ref 1.8–7.4)
IANC: 10.63 K/UL — HIGH (ref 1.8–7.4)
IANC: 10.63 K/UL — HIGH (ref 1.8–7.4)
IANC: 10.68 K/UL — HIGH (ref 1.8–7.4)
IANC: 10.78 K/UL — HIGH (ref 1.8–7.4)
IANC: 10.92 K/UL — HIGH (ref 1.8–7.4)
IANC: 10.92 K/UL — HIGH (ref 1.8–7.4)
IANC: 10.94 K/UL — HIGH (ref 1.8–7.4)
IANC: 10.94 K/UL — HIGH (ref 1.8–7.4)
IANC: 11.06 K/UL — HIGH (ref 1.8–7.4)
IANC: 11.15 K/UL — HIGH (ref 1.8–7.4)
IANC: 11.16 K/UL — HIGH (ref 1.8–7.4)
IANC: 11.17 K/UL — HIGH (ref 1.8–7.4)
IANC: 11.17 K/UL — HIGH (ref 1.8–7.4)
IANC: 11.18 K/UL — HIGH (ref 1.8–7.4)
IANC: 11.2 K/UL — HIGH (ref 1.8–7.4)
IANC: 11.21 K/UL — HIGH (ref 1.8–7.4)
IANC: 11.42 K/UL — HIGH (ref 1.8–7.4)
IANC: 11.42 K/UL — HIGH (ref 1.8–7.4)
IANC: 11.44 K/UL — HIGH (ref 1.8–7.4)
IANC: 11.51 K/UL — HIGH (ref 1.8–7.4)
IANC: 11.59 K/UL — HIGH (ref 1.8–7.4)
IANC: 11.59 K/UL — HIGH (ref 1.8–7.4)
IANC: 11.74 K/UL — HIGH (ref 1.8–7.4)
IANC: 11.8 K/UL — HIGH (ref 1.8–7.4)
IANC: 11.8 K/UL — HIGH (ref 1.8–7.4)
IANC: 11.86 K/UL — HIGH (ref 1.8–7.4)
IANC: 11.86 K/UL — HIGH (ref 1.8–7.4)
IANC: 11.91 K/UL — HIGH (ref 1.8–7.4)
IANC: 11.91 K/UL — HIGH (ref 1.8–7.4)
IANC: 12 K/UL — HIGH (ref 1.8–7.4)
IANC: 12.11 K/UL — HIGH (ref 1.8–7.4)
IANC: 12.2 K/UL — HIGH (ref 1.8–7.4)
IANC: 12.2 K/UL — HIGH (ref 1.8–7.4)
IANC: 12.49 K/UL — HIGH (ref 1.8–7.4)
IANC: 12.49 K/UL — HIGH (ref 1.8–7.4)
IANC: 12.5 K/UL — HIGH (ref 1.8–7.4)
IANC: 12.57 K/UL — HIGH (ref 1.8–7.4)
IANC: 12.76 K/UL — HIGH (ref 1.8–7.4)
IANC: 13.18 K/UL — HIGH (ref 1.8–7.4)
IANC: 13.37 K/UL — HIGH (ref 1.8–7.4)
IANC: 13.41 K/UL — HIGH (ref 1.8–7.4)
IANC: 13.93 K/UL — HIGH (ref 1.8–7.4)
IANC: 14.09 K/UL — HIGH (ref 1.8–7.4)
IANC: 14.09 K/UL — HIGH (ref 1.8–7.4)
IANC: 14.1 K/UL — HIGH (ref 1.8–7.4)
IANC: 14.1 K/UL — HIGH (ref 1.8–7.4)
IANC: 14.61 K/UL — HIGH (ref 1.8–7.4)
IANC: 14.67 K/UL — HIGH (ref 1.8–7.4)
IANC: 14.67 K/UL — HIGH (ref 1.8–7.4)
IANC: 14.7 K/UL — HIGH (ref 1.8–7.4)
IANC: 14.7 K/UL — HIGH (ref 1.8–7.4)
IANC: 15.24 K/UL — HIGH (ref 1.8–7.4)
IANC: 15.24 K/UL — HIGH (ref 1.8–7.4)
IANC: 15.78 K/UL — HIGH (ref 1.8–7.4)
IANC: 16.02 K/UL — HIGH (ref 1.8–7.4)
IANC: 16.14 K/UL — HIGH (ref 1.8–7.4)
IANC: 16.14 K/UL — HIGH (ref 1.8–7.4)
IANC: 16.31 K/UL — HIGH (ref 1.8–7.4)
IANC: 16.31 K/UL — HIGH (ref 1.8–7.4)
IANC: 18.27 K/UL — HIGH (ref 1.8–7.4)
IANC: 18.27 K/UL — HIGH (ref 1.8–7.4)
IANC: 2.58 K/UL — SIGNIFICANT CHANGE UP (ref 1.8–7.4)
IANC: 21.85 K/UL — HIGH (ref 1.8–7.4)
IANC: 21.85 K/UL — HIGH (ref 1.8–7.4)
IANC: 24.25 K/UL — HIGH (ref 1.8–7.4)
IANC: 24.25 K/UL — HIGH (ref 1.8–7.4)
IANC: 25.56 K/UL — HIGH (ref 1.8–7.4)
IANC: 25.56 K/UL — HIGH (ref 1.8–7.4)
IANC: 27.45 K/UL — HIGH (ref 1.8–7.4)
IANC: 27.45 K/UL — HIGH (ref 1.8–7.4)
IANC: 29.15 K/UL — HIGH (ref 1.8–7.4)
IANC: 29.15 K/UL — HIGH (ref 1.8–7.4)
IANC: 3.28 K/UL — SIGNIFICANT CHANGE UP (ref 1.8–7.4)
IANC: 3.42 K/UL — SIGNIFICANT CHANGE UP (ref 1.8–7.4)
IANC: 3.49 K/UL — SIGNIFICANT CHANGE UP (ref 1.8–7.4)
IANC: 3.77 K/UL — SIGNIFICANT CHANGE UP (ref 1.8–7.4)
IANC: 30.19 K/UL — HIGH (ref 1.8–7.4)
IANC: 30.19 K/UL — HIGH (ref 1.8–7.4)
IANC: 36.36 K/UL — HIGH (ref 1.8–7.4)
IANC: 36.36 K/UL — HIGH (ref 1.8–7.4)
IANC: 4.3 K/UL — SIGNIFICANT CHANGE UP (ref 1.8–7.4)
IANC: 4.93 K/UL — SIGNIFICANT CHANGE UP (ref 1.8–7.4)
IANC: 5.47 K/UL — SIGNIFICANT CHANGE UP (ref 1.8–7.4)
IANC: 5.54 K/UL — SIGNIFICANT CHANGE UP (ref 1.8–7.4)
IANC: 5.59 K/UL — SIGNIFICANT CHANGE UP (ref 1.8–7.4)
IANC: 5.61 K/UL — SIGNIFICANT CHANGE UP (ref 1.8–7.4)
IANC: 5.63 K/UL — SIGNIFICANT CHANGE UP (ref 1.8–7.4)
IANC: 5.64 K/UL — SIGNIFICANT CHANGE UP (ref 1.8–7.4)
IANC: 6.31 K/UL — SIGNIFICANT CHANGE UP (ref 1.8–7.4)
IANC: 7.12 K/UL — SIGNIFICANT CHANGE UP (ref 1.8–7.4)
IANC: 7.69 K/UL — HIGH (ref 1.8–7.4)
IANC: 7.73 K/UL — HIGH (ref 1.8–7.4)
IANC: 7.92 K/UL — HIGH (ref 1.8–7.4)
IANC: 8.04 K/UL — HIGH (ref 1.8–7.4)
IANC: 8.14 K/UL — HIGH (ref 1.8–7.4)
IANC: 8.59 K/UL — HIGH (ref 1.8–7.4)
IANC: 8.61 K/UL — HIGH (ref 1.8–7.4)
IANC: 8.61 K/UL — HIGH (ref 1.8–7.4)
IANC: 8.74 K/UL — HIGH (ref 1.8–7.4)
IANC: 8.81 K/UL — HIGH (ref 1.8–7.4)
IANC: 8.84 K/UL — HIGH (ref 1.8–7.4)
IANC: 8.84 K/UL — HIGH (ref 1.8–7.4)
IANC: 8.87 K/UL — HIGH (ref 1.8–7.4)
IANC: 8.88 K/UL — HIGH (ref 1.8–7.4)
IANC: 8.9 K/UL — HIGH (ref 1.8–7.4)
IANC: 9.01 K/UL — HIGH (ref 1.8–7.4)
IANC: 9.2 K/UL — HIGH (ref 1.8–7.4)
IANC: 9.2 K/UL — HIGH (ref 1.8–7.4)
IANC: 9.28 K/UL — HIGH (ref 1.8–7.4)
IANC: 9.3 K/UL — HIGH (ref 1.8–7.4)
IANC: 9.3 K/UL — HIGH (ref 1.8–7.4)
IANC: 9.33 K/UL — HIGH (ref 1.8–7.4)
IANC: 9.35 K/UL — HIGH (ref 1.8–7.4)
IANC: 9.36 K/UL — HIGH (ref 1.8–7.4)
IANC: 9.44 K/UL — HIGH (ref 1.8–7.4)
IANC: 9.44 K/UL — HIGH (ref 1.8–7.4)
IANC: 9.47 K/UL — HIGH (ref 1.8–7.4)
IANC: 9.68 K/UL — HIGH (ref 1.8–7.4)
IANC: 9.68 K/UL — HIGH (ref 1.8–7.4)
IANC: 9.75 K/UL — HIGH (ref 1.8–7.4)
IANC: 9.75 K/UL — HIGH (ref 1.8–7.4)
IANC: 9.84 K/UL — HIGH (ref 1.8–7.4)
IANC: 9.84 K/UL — HIGH (ref 1.8–7.4)
IANC: 9.95 K/UL — HIGH (ref 1.8–7.4)
IMM GRANULOCYTES NFR BLD AUTO: 0.5 % — SIGNIFICANT CHANGE UP (ref 0–0.9)
IMM GRANULOCYTES NFR BLD AUTO: 0.7 % — SIGNIFICANT CHANGE UP (ref 0–0.9)
IMM GRANULOCYTES NFR BLD AUTO: 0.8 % — SIGNIFICANT CHANGE UP (ref 0–0.9)
IMM GRANULOCYTES NFR BLD AUTO: 0.9 % — SIGNIFICANT CHANGE UP (ref 0–0.9)
IMM GRANULOCYTES NFR BLD AUTO: 1 % — HIGH (ref 0–0.9)
IMM GRANULOCYTES NFR BLD AUTO: 1 % — HIGH (ref 0–0.9)
IMM GRANULOCYTES NFR BLD AUTO: 1.1 % — HIGH (ref 0–0.9)
IMM GRANULOCYTES NFR BLD AUTO: 1.2 % — HIGH (ref 0–0.9)
IMM GRANULOCYTES NFR BLD AUTO: 1.3 % — HIGH (ref 0–0.9)
IMM GRANULOCYTES NFR BLD AUTO: 1.4 % — HIGH (ref 0–0.9)
IMM GRANULOCYTES NFR BLD AUTO: 1.5 % — HIGH (ref 0–0.9)
IMM GRANULOCYTES NFR BLD AUTO: 1.5 % — HIGH (ref 0–0.9)
IMM GRANULOCYTES NFR BLD AUTO: 1.6 % — HIGH (ref 0–0.9)
IMM GRANULOCYTES NFR BLD AUTO: 1.7 % — HIGH (ref 0–0.9)
IMM GRANULOCYTES NFR BLD AUTO: 1.8 % — HIGH (ref 0–0.9)
IMM GRANULOCYTES NFR BLD AUTO: 1.9 % — HIGH (ref 0–0.9)
IMM GRANULOCYTES NFR BLD AUTO: 10.2 % — HIGH (ref 0–0.9)
IMM GRANULOCYTES NFR BLD AUTO: 11.7 % — HIGH (ref 0–0.9)
IMM GRANULOCYTES NFR BLD AUTO: 12.8 % — HIGH (ref 0–0.9)
IMM GRANULOCYTES NFR BLD AUTO: 2.1 % — HIGH (ref 0–0.9)
IMM GRANULOCYTES NFR BLD AUTO: 2.1 % — HIGH (ref 0–0.9)
IMM GRANULOCYTES NFR BLD AUTO: 2.2 % — HIGH (ref 0–0.9)
IMM GRANULOCYTES NFR BLD AUTO: 2.3 % — HIGH (ref 0–0.9)
IMM GRANULOCYTES NFR BLD AUTO: 2.4 % — HIGH (ref 0–0.9)
IMM GRANULOCYTES NFR BLD AUTO: 2.5 % — HIGH (ref 0–0.9)
IMM GRANULOCYTES NFR BLD AUTO: 2.6 % — HIGH (ref 0–0.9)
IMM GRANULOCYTES NFR BLD AUTO: 2.7 % — HIGH (ref 0–0.9)
IMM GRANULOCYTES NFR BLD AUTO: 2.8 % — HIGH (ref 0–0.9)
IMM GRANULOCYTES NFR BLD AUTO: 2.8 % — HIGH (ref 0–0.9)
IMM GRANULOCYTES NFR BLD AUTO: 3 % — HIGH (ref 0–0.9)
IMM GRANULOCYTES NFR BLD AUTO: 3.2 % — HIGH (ref 0–0.9)
IMM GRANULOCYTES NFR BLD AUTO: 3.4 % — HIGH (ref 0–0.9)
IMM GRANULOCYTES NFR BLD AUTO: 3.5 % — HIGH (ref 0–0.9)
IMM GRANULOCYTES NFR BLD AUTO: 3.7 % — HIGH (ref 0–0.9)
IMM GRANULOCYTES NFR BLD AUTO: 3.7 % — HIGH (ref 0–0.9)
IMM GRANULOCYTES NFR BLD AUTO: 3.8 % — HIGH (ref 0–0.9)
IMM GRANULOCYTES NFR BLD AUTO: 4 % — HIGH (ref 0–0.9)
IMM GRANULOCYTES NFR BLD AUTO: 4 % — HIGH (ref 0–0.9)
IMM GRANULOCYTES NFR BLD AUTO: 4.5 % — HIGH (ref 0–0.9)
IMM GRANULOCYTES NFR BLD AUTO: 4.5 % — HIGH (ref 0–0.9)
IMM GRANULOCYTES NFR BLD AUTO: 4.7 % — HIGH (ref 0–0.9)
IMM GRANULOCYTES NFR BLD AUTO: 5 % — HIGH (ref 0–0.9)
IMM GRANULOCYTES NFR BLD AUTO: 5 % — HIGH (ref 0–0.9)
IMM GRANULOCYTES NFR BLD AUTO: 5.9 % — HIGH (ref 0–0.9)
IMM GRANULOCYTES NFR BLD AUTO: 6.1 % — HIGH (ref 0–0.9)
IMM GRANULOCYTES NFR BLD AUTO: 7.1 % — HIGH (ref 0–0.9)
IMM GRANULOCYTES NFR BLD AUTO: 8.3 % — HIGH (ref 0–0.9)
IMM GRANULOCYTES NFR BLD AUTO: 9.5 % — HIGH (ref 0–0.9)
IMM GRANULOCYTES NFR BLD AUTO: 9.6 % — HIGH (ref 0–0.9)
IMM GRANULOCYTES NFR BLD AUTO: 9.7 % — HIGH (ref 0–0.9)
INR BLD: 0.95 RATIO — SIGNIFICANT CHANGE UP (ref 0.85–1.18)
INR BLD: 0.96 RATIO — SIGNIFICANT CHANGE UP (ref 0.85–1.18)
INR BLD: 1.02 RATIO — SIGNIFICANT CHANGE UP (ref 0.85–1.18)
INR BLD: 1.03 RATIO — SIGNIFICANT CHANGE UP (ref 0.85–1.18)
INR BLD: 1.05 RATIO — SIGNIFICANT CHANGE UP (ref 0.85–1.18)
INR BLD: 1.06 RATIO — SIGNIFICANT CHANGE UP (ref 0.85–1.18)
INR BLD: 1.07 RATIO — SIGNIFICANT CHANGE UP (ref 0.85–1.18)
INR BLD: 1.09 RATIO — SIGNIFICANT CHANGE UP (ref 0.85–1.18)
INR BLD: 1.1 RATIO — SIGNIFICANT CHANGE UP (ref 0.85–1.18)
INR BLD: 1.18 RATIO — SIGNIFICANT CHANGE UP (ref 0.85–1.18)
INR BLD: 1.19 RATIO — HIGH (ref 0.85–1.18)
INR BLD: 1.25 RATIO — HIGH (ref 0.85–1.18)
INR BLD: 1.26 RATIO — HIGH (ref 0.85–1.18)
INR BLD: 1.61 RATIO — HIGH (ref 0.85–1.18)
INR BLD: 1.61 RATIO — HIGH (ref 0.85–1.18)
INR BLD: 1.65 RATIO — HIGH (ref 0.85–1.18)
INR BLD: 1.65 RATIO — HIGH (ref 0.85–1.18)
INR BLD: <0.9 RATIO — SIGNIFICANT CHANGE UP (ref 0.85–1.18)
IRON SATN MFR SERPL: 10 % — LOW (ref 14–50)
IRON SATN MFR SERPL: 12 UG/DL — LOW (ref 30–160)
IRON SATN MFR SERPL: 12 UG/DL — LOW (ref 30–160)
IRON SATN MFR SERPL: 13 UG/DL — LOW (ref 30–160)
IRON SATN MFR SERPL: 22 UG/DL — LOW (ref 30–160)
IRON SATN MFR SERPL: 7 % — LOW (ref 14–50)
IRON SATN MFR SERPL: 9 % — LOW (ref 14–50)
IRON SATN MFR SERPL: 9 % — LOW (ref 14–50)
KETONES UR-MCNC: ABNORMAL MG/DL
KETONES UR-MCNC: ABNORMAL MG/DL
KETONES UR-MCNC: NEGATIVE MG/DL — SIGNIFICANT CHANGE UP
KETONES UR-MCNC: NEGATIVE — SIGNIFICANT CHANGE UP
LACTATE BLDV-MCNC: 0.6 MMOL/L — SIGNIFICANT CHANGE UP (ref 0.5–2)
LACTATE BLDV-MCNC: 0.7 MMOL/L — SIGNIFICANT CHANGE UP (ref 0.5–2)
LACTATE BLDV-MCNC: 0.8 MMOL/L — SIGNIFICANT CHANGE UP (ref 0.5–2)
LACTATE BLDV-MCNC: 0.8 MMOL/L — SIGNIFICANT CHANGE UP (ref 0.5–2)
LACTATE BLDV-MCNC: 0.9 MMOL/L — SIGNIFICANT CHANGE UP (ref 0.5–2)
LACTATE BLDV-MCNC: 0.9 MMOL/L — SIGNIFICANT CHANGE UP (ref 0.5–2)
LACTATE BLDV-MCNC: 1 MMOL/L — SIGNIFICANT CHANGE UP (ref 0.5–2)
LACTATE BLDV-MCNC: 1.1 MMOL/L — SIGNIFICANT CHANGE UP (ref 0.5–2)
LACTATE BLDV-MCNC: 1.2 MMOL/L — SIGNIFICANT CHANGE UP (ref 0.5–2)
LACTATE BLDV-MCNC: 1.3 MMOL/L — SIGNIFICANT CHANGE UP (ref 0.5–2)
LACTATE BLDV-MCNC: 1.3 MMOL/L — SIGNIFICANT CHANGE UP (ref 0.5–2)
LACTATE BLDV-MCNC: 1.5 MMOL/L — SIGNIFICANT CHANGE UP (ref 0.5–2)
LACTATE BLDV-MCNC: 1.5 MMOL/L — SIGNIFICANT CHANGE UP (ref 0.5–2)
LACTATE BLDV-MCNC: 1.7 MMOL/L — SIGNIFICANT CHANGE UP (ref 0.5–2)
LACTATE BLDV-MCNC: 1.8 MMOL/L — SIGNIFICANT CHANGE UP (ref 0.5–2)
LACTATE BLDV-MCNC: 1.8 MMOL/L — SIGNIFICANT CHANGE UP (ref 0.5–2)
LACTATE BLDV-MCNC: 2 MMOL/L — SIGNIFICANT CHANGE UP (ref 0.5–2)
LACTATE SERPL-SCNC: 0.8 MMOL/L — SIGNIFICANT CHANGE UP (ref 0.5–2)
LACTATE SERPL-SCNC: 0.8 MMOL/L — SIGNIFICANT CHANGE UP (ref 0.5–2)
LDH SERPL L TO P-CCNC: 189 U/L — SIGNIFICANT CHANGE UP (ref 135–225)
LDH SERPL L TO P-CCNC: 207 U/L — SIGNIFICANT CHANGE UP
LDH SERPL L TO P-CCNC: 207 U/L — SIGNIFICANT CHANGE UP
LDH SERPL L TO P-CCNC: 289 U/L — HIGH (ref 135–225)
LDH SERPL L TO P-CCNC: 289 U/L — HIGH (ref 135–225)
LEGIONELLA AG UR QL: NEGATIVE — SIGNIFICANT CHANGE UP
LEUKOCYTE ESTERASE UR-ACNC: ABNORMAL
LEUKOCYTE ESTERASE UR-ACNC: NEGATIVE — SIGNIFICANT CHANGE UP
LIPID PNL WITH DIRECT LDL SERPL: 2 MG/DL — SIGNIFICANT CHANGE UP
LYMPHOCYTES # BLD AUTO: 0.09 K/UL — LOW (ref 1–3.3)
LYMPHOCYTES # BLD AUTO: 0.19 K/UL — LOW (ref 1–3.3)
LYMPHOCYTES # BLD AUTO: 0.19 K/UL — LOW (ref 1–3.3)
LYMPHOCYTES # BLD AUTO: 0.25 K/UL — LOW (ref 1–3.3)
LYMPHOCYTES # BLD AUTO: 0.25 K/UL — LOW (ref 1–3.3)
LYMPHOCYTES # BLD AUTO: 0.32 K/UL — LOW (ref 1–3.3)
LYMPHOCYTES # BLD AUTO: 0.32 K/UL — LOW (ref 1–3.3)
LYMPHOCYTES # BLD AUTO: 0.34 K/UL — LOW (ref 1–3.3)
LYMPHOCYTES # BLD AUTO: 0.34 K/UL — LOW (ref 1–3.3)
LYMPHOCYTES # BLD AUTO: 0.35 K/UL — LOW (ref 1–3.3)
LYMPHOCYTES # BLD AUTO: 0.35 K/UL — LOW (ref 1–3.3)
LYMPHOCYTES # BLD AUTO: 0.38 K/UL — LOW (ref 1–3.3)
LYMPHOCYTES # BLD AUTO: 0.38 K/UL — LOW (ref 1–3.3)
LYMPHOCYTES # BLD AUTO: 0.39 K/UL — LOW (ref 1–3.3)
LYMPHOCYTES # BLD AUTO: 0.39 K/UL — LOW (ref 1–3.3)
LYMPHOCYTES # BLD AUTO: 0.4 K/UL — LOW (ref 1–3.3)
LYMPHOCYTES # BLD AUTO: 0.41 K/UL — LOW (ref 1–3.3)
LYMPHOCYTES # BLD AUTO: 0.44 K/UL — LOW (ref 1–3.3)
LYMPHOCYTES # BLD AUTO: 0.45 K/UL — LOW (ref 1–3.3)
LYMPHOCYTES # BLD AUTO: 0.46 K/UL — LOW (ref 1–3.3)
LYMPHOCYTES # BLD AUTO: 0.48 K/UL — LOW (ref 1–3.3)
LYMPHOCYTES # BLD AUTO: 0.5 K/UL — LOW (ref 1–3.3)
LYMPHOCYTES # BLD AUTO: 0.51 K/UL — LOW (ref 1–3.3)
LYMPHOCYTES # BLD AUTO: 0.51 K/UL — LOW (ref 1–3.3)
LYMPHOCYTES # BLD AUTO: 0.53 K/UL — LOW (ref 1–3.3)
LYMPHOCYTES # BLD AUTO: 0.54 K/UL — LOW (ref 1–3.3)
LYMPHOCYTES # BLD AUTO: 0.55 K/UL — LOW (ref 1–3.3)
LYMPHOCYTES # BLD AUTO: 0.55 K/UL — LOW (ref 1–3.3)
LYMPHOCYTES # BLD AUTO: 0.56 K/UL — LOW (ref 1–3.3)
LYMPHOCYTES # BLD AUTO: 0.57 K/UL — LOW (ref 1–3.3)
LYMPHOCYTES # BLD AUTO: 0.57 K/UL — LOW (ref 1–3.3)
LYMPHOCYTES # BLD AUTO: 0.58 K/UL — LOW (ref 1–3.3)
LYMPHOCYTES # BLD AUTO: 0.59 K/UL — LOW (ref 1–3.3)
LYMPHOCYTES # BLD AUTO: 0.59 K/UL — LOW (ref 1–3.3)
LYMPHOCYTES # BLD AUTO: 0.6 K/UL — LOW (ref 1–3.3)
LYMPHOCYTES # BLD AUTO: 0.62 K/UL — LOW (ref 1–3.3)
LYMPHOCYTES # BLD AUTO: 0.64 K/UL — LOW (ref 1–3.3)
LYMPHOCYTES # BLD AUTO: 0.64 K/UL — LOW (ref 1–3.3)
LYMPHOCYTES # BLD AUTO: 0.65 K/UL — LOW (ref 1–3.3)
LYMPHOCYTES # BLD AUTO: 0.66 K/UL — LOW (ref 1–3.3)
LYMPHOCYTES # BLD AUTO: 0.68 K/UL — LOW (ref 1–3.3)
LYMPHOCYTES # BLD AUTO: 0.69 K/UL — LOW (ref 1–3.3)
LYMPHOCYTES # BLD AUTO: 0.7 K/UL — LOW (ref 1–3.3)
LYMPHOCYTES # BLD AUTO: 0.7 K/UL — LOW (ref 1–3.3)
LYMPHOCYTES # BLD AUTO: 0.71 K/UL — LOW (ref 1–3.3)
LYMPHOCYTES # BLD AUTO: 0.72 K/UL — LOW (ref 1–3.3)
LYMPHOCYTES # BLD AUTO: 0.73 K/UL — LOW (ref 1–3.3)
LYMPHOCYTES # BLD AUTO: 0.74 K/UL — LOW (ref 1–3.3)
LYMPHOCYTES # BLD AUTO: 0.74 K/UL — LOW (ref 1–3.3)
LYMPHOCYTES # BLD AUTO: 0.76 K/UL — LOW (ref 1–3.3)
LYMPHOCYTES # BLD AUTO: 0.79 K/UL — LOW (ref 1–3.3)
LYMPHOCYTES # BLD AUTO: 0.8 % — LOW (ref 13–44)
LYMPHOCYTES # BLD AUTO: 0.8 % — LOW (ref 13–44)
LYMPHOCYTES # BLD AUTO: 0.8 K/UL — LOW (ref 1–3.3)
LYMPHOCYTES # BLD AUTO: 0.8 K/UL — LOW (ref 1–3.3)
LYMPHOCYTES # BLD AUTO: 0.81 K/UL — LOW (ref 1–3.3)
LYMPHOCYTES # BLD AUTO: 0.81 K/UL — LOW (ref 1–3.3)
LYMPHOCYTES # BLD AUTO: 0.82 K/UL — LOW (ref 1–3.3)
LYMPHOCYTES # BLD AUTO: 0.83 K/UL — LOW (ref 1–3.3)
LYMPHOCYTES # BLD AUTO: 0.83 K/UL — LOW (ref 1–3.3)
LYMPHOCYTES # BLD AUTO: 0.85 K/UL — LOW (ref 1–3.3)
LYMPHOCYTES # BLD AUTO: 0.86 K/UL — LOW (ref 1–3.3)
LYMPHOCYTES # BLD AUTO: 0.89 K/UL — LOW (ref 1–3.3)
LYMPHOCYTES # BLD AUTO: 0.89 K/UL — LOW (ref 1–3.3)
LYMPHOCYTES # BLD AUTO: 0.9 % — LOW (ref 13–44)
LYMPHOCYTES # BLD AUTO: 0.92 K/UL — LOW (ref 1–3.3)
LYMPHOCYTES # BLD AUTO: 0.94 K/UL — LOW (ref 1–3.3)
LYMPHOCYTES # BLD AUTO: 0.94 K/UL — LOW (ref 1–3.3)
LYMPHOCYTES # BLD AUTO: 0.95 K/UL — LOW (ref 1–3.3)
LYMPHOCYTES # BLD AUTO: 1 K/UL — SIGNIFICANT CHANGE UP (ref 1–3.3)
LYMPHOCYTES # BLD AUTO: 1 K/UL — SIGNIFICANT CHANGE UP (ref 1–3.3)
LYMPHOCYTES # BLD AUTO: 1.02 K/UL — SIGNIFICANT CHANGE UP (ref 1–3.3)
LYMPHOCYTES # BLD AUTO: 1.02 K/UL — SIGNIFICANT CHANGE UP (ref 1–3.3)
LYMPHOCYTES # BLD AUTO: 1.03 K/UL — SIGNIFICANT CHANGE UP (ref 1–3.3)
LYMPHOCYTES # BLD AUTO: 1.03 K/UL — SIGNIFICANT CHANGE UP (ref 1–3.3)
LYMPHOCYTES # BLD AUTO: 1.04 K/UL — SIGNIFICANT CHANGE UP (ref 1–3.3)
LYMPHOCYTES # BLD AUTO: 1.04 K/UL — SIGNIFICANT CHANGE UP (ref 1–3.3)
LYMPHOCYTES # BLD AUTO: 1.06 K/UL — SIGNIFICANT CHANGE UP (ref 1–3.3)
LYMPHOCYTES # BLD AUTO: 1.06 K/UL — SIGNIFICANT CHANGE UP (ref 1–3.3)
LYMPHOCYTES # BLD AUTO: 1.07 K/UL — SIGNIFICANT CHANGE UP (ref 1–3.3)
LYMPHOCYTES # BLD AUTO: 1.1 K/UL — SIGNIFICANT CHANGE UP (ref 1–3.3)
LYMPHOCYTES # BLD AUTO: 1.16 K/UL — SIGNIFICANT CHANGE UP (ref 1–3.3)
LYMPHOCYTES # BLD AUTO: 1.16 K/UL — SIGNIFICANT CHANGE UP (ref 1–3.3)
LYMPHOCYTES # BLD AUTO: 1.19 K/UL — SIGNIFICANT CHANGE UP (ref 1–3.3)
LYMPHOCYTES # BLD AUTO: 1.2 % — LOW (ref 13–44)
LYMPHOCYTES # BLD AUTO: 1.2 % — LOW (ref 13–44)
LYMPHOCYTES # BLD AUTO: 1.3 % — LOW (ref 13–44)
LYMPHOCYTES # BLD AUTO: 1.3 % — LOW (ref 13–44)
LYMPHOCYTES # BLD AUTO: 1.35 K/UL — SIGNIFICANT CHANGE UP (ref 1–3.3)
LYMPHOCYTES # BLD AUTO: 1.37 K/UL — SIGNIFICANT CHANGE UP (ref 1–3.3)
LYMPHOCYTES # BLD AUTO: 1.4 % — LOW (ref 13–44)
LYMPHOCYTES # BLD AUTO: 1.4 % — LOW (ref 13–44)
LYMPHOCYTES # BLD AUTO: 1.49 K/UL — SIGNIFICANT CHANGE UP (ref 1–3.3)
LYMPHOCYTES # BLD AUTO: 1.61 K/UL — SIGNIFICANT CHANGE UP (ref 1–3.3)
LYMPHOCYTES # BLD AUTO: 1.7 % — LOW (ref 13–44)
LYMPHOCYTES # BLD AUTO: 10.4 % — LOW (ref 13–44)
LYMPHOCYTES # BLD AUTO: 10.7 % — LOW (ref 13–44)
LYMPHOCYTES # BLD AUTO: 10.8 % — LOW (ref 13–44)
LYMPHOCYTES # BLD AUTO: 11.1 % — LOW (ref 13–44)
LYMPHOCYTES # BLD AUTO: 15.1 % — SIGNIFICANT CHANGE UP (ref 13–44)
LYMPHOCYTES # BLD AUTO: 15.6 % — SIGNIFICANT CHANGE UP (ref 13–44)
LYMPHOCYTES # BLD AUTO: 17.1 % — SIGNIFICANT CHANGE UP (ref 13–44)
LYMPHOCYTES # BLD AUTO: 2.2 % — LOW (ref 13–44)
LYMPHOCYTES # BLD AUTO: 2.6 % — LOW (ref 13–44)
LYMPHOCYTES # BLD AUTO: 2.6 % — LOW (ref 13–44)
LYMPHOCYTES # BLD AUTO: 2.9 % — LOW (ref 13–44)
LYMPHOCYTES # BLD AUTO: 20 % — SIGNIFICANT CHANGE UP (ref 13–44)
LYMPHOCYTES # BLD AUTO: 3.1 % — LOW (ref 13–44)
LYMPHOCYTES # BLD AUTO: 3.2 % — LOW (ref 13–44)
LYMPHOCYTES # BLD AUTO: 3.3 % — LOW (ref 13–44)
LYMPHOCYTES # BLD AUTO: 3.4 % — LOW (ref 13–44)
LYMPHOCYTES # BLD AUTO: 3.4 % — LOW (ref 13–44)
LYMPHOCYTES # BLD AUTO: 3.5 % — LOW (ref 13–44)
LYMPHOCYTES # BLD AUTO: 3.6 % — LOW (ref 13–44)
LYMPHOCYTES # BLD AUTO: 3.7 % — LOW (ref 13–44)
LYMPHOCYTES # BLD AUTO: 3.8 % — LOW (ref 13–44)
LYMPHOCYTES # BLD AUTO: 4 % — LOW (ref 13–44)
LYMPHOCYTES # BLD AUTO: 4 % — LOW (ref 13–44)
LYMPHOCYTES # BLD AUTO: 4.1 % — LOW (ref 13–44)
LYMPHOCYTES # BLD AUTO: 4.1 % — LOW (ref 13–44)
LYMPHOCYTES # BLD AUTO: 4.2 % — LOW (ref 13–44)
LYMPHOCYTES # BLD AUTO: 4.3 % — LOW (ref 13–44)
LYMPHOCYTES # BLD AUTO: 4.4 % — LOW (ref 13–44)
LYMPHOCYTES # BLD AUTO: 4.5 % — LOW (ref 13–44)
LYMPHOCYTES # BLD AUTO: 4.6 % — LOW (ref 13–44)
LYMPHOCYTES # BLD AUTO: 4.6 % — LOW (ref 13–44)
LYMPHOCYTES # BLD AUTO: 4.8 % — LOW (ref 13–44)
LYMPHOCYTES # BLD AUTO: 4.9 % — LOW (ref 13–44)
LYMPHOCYTES # BLD AUTO: 4.9 % — LOW (ref 13–44)
LYMPHOCYTES # BLD AUTO: 5 % — LOW (ref 13–44)
LYMPHOCYTES # BLD AUTO: 5.1 % — LOW (ref 13–44)
LYMPHOCYTES # BLD AUTO: 5.1 % — LOW (ref 13–44)
LYMPHOCYTES # BLD AUTO: 5.2 % — LOW (ref 13–44)
LYMPHOCYTES # BLD AUTO: 5.3 % — LOW (ref 13–44)
LYMPHOCYTES # BLD AUTO: 5.4 % — LOW (ref 13–44)
LYMPHOCYTES # BLD AUTO: 5.4 % — LOW (ref 13–44)
LYMPHOCYTES # BLD AUTO: 5.6 % — LOW (ref 13–44)
LYMPHOCYTES # BLD AUTO: 5.7 % — LOW (ref 13–44)
LYMPHOCYTES # BLD AUTO: 5.8 % — LOW (ref 13–44)
LYMPHOCYTES # BLD AUTO: 5.9 % — LOW (ref 13–44)
LYMPHOCYTES # BLD AUTO: 6.1 % — LOW (ref 13–44)
LYMPHOCYTES # BLD AUTO: 6.2 % — LOW (ref 13–44)
LYMPHOCYTES # BLD AUTO: 6.3 % — LOW (ref 13–44)
LYMPHOCYTES # BLD AUTO: 6.3 % — LOW (ref 13–44)
LYMPHOCYTES # BLD AUTO: 6.4 % — LOW (ref 13–44)
LYMPHOCYTES # BLD AUTO: 7 % — LOW (ref 13–44)
LYMPHOCYTES # BLD AUTO: 7.1 % — LOW (ref 13–44)
LYMPHOCYTES # BLD AUTO: 7.2 % — LOW (ref 13–44)
LYMPHOCYTES # BLD AUTO: 7.4 % — LOW (ref 13–44)
LYMPHOCYTES # BLD AUTO: 7.4 % — LOW (ref 13–44)
LYMPHOCYTES # BLD AUTO: 7.7 % — LOW (ref 13–44)
LYMPHOCYTES # BLD AUTO: 7.7 % — LOW (ref 13–44)
LYMPHOCYTES # BLD AUTO: 7.8 % — LOW (ref 13–44)
LYMPHOCYTES # BLD AUTO: 8.2 % — LOW (ref 13–44)
LYMPHOCYTES # BLD AUTO: 8.2 % — LOW (ref 13–44)
LYMPHOCYTES # BLD AUTO: 8.3 % — LOW (ref 13–44)
LYMPHOCYTES # BLD AUTO: 8.3 % — LOW (ref 13–44)
LYMPHOCYTES # BLD AUTO: 9 % — LOW (ref 13–44)
LYMPHOCYTES # BLD AUTO: 9.1 % — LOW (ref 13–44)
LYMPHOCYTES # BLD AUTO: 9.6 % — LOW (ref 13–44)
LYMPHOCYTES # FLD: 10 % — SIGNIFICANT CHANGE UP
LYMPHOCYTES # FLD: 10 % — SIGNIFICANT CHANGE UP
LYMPHOCYTES # FLD: 8 % — SIGNIFICANT CHANGE UP
M PNEUMO DNA SPEC QL NAA+PROBE: SIGNIFICANT CHANGE UP
MACROCYTES BLD QL: SIGNIFICANT CHANGE UP
MACROCYTES BLD QL: SLIGHT — SIGNIFICANT CHANGE UP
MAGNESIUM SERPL-MCNC: 1.6 MG/DL — SIGNIFICANT CHANGE UP (ref 1.6–2.6)
MAGNESIUM SERPL-MCNC: 1.7 MG/DL — SIGNIFICANT CHANGE UP (ref 1.6–2.6)
MAGNESIUM SERPL-MCNC: 1.7 MG/DL — SIGNIFICANT CHANGE UP (ref 1.6–2.6)
MAGNESIUM SERPL-MCNC: 1.8 MG/DL — SIGNIFICANT CHANGE UP (ref 1.6–2.6)
MAGNESIUM SERPL-MCNC: 1.9 MG/DL — SIGNIFICANT CHANGE UP (ref 1.6–2.6)
MAGNESIUM SERPL-MCNC: 2 MG/DL — SIGNIFICANT CHANGE UP (ref 1.6–2.6)
MAGNESIUM SERPL-MCNC: 2.1 MG/DL — SIGNIFICANT CHANGE UP (ref 1.6–2.6)
MAGNESIUM SERPL-MCNC: 2.2 MG/DL — SIGNIFICANT CHANGE UP (ref 1.6–2.6)
MAGNESIUM SERPL-MCNC: 2.3 MG/DL — SIGNIFICANT CHANGE UP (ref 1.6–2.6)
MAGNESIUM SERPL-MCNC: 2.4 MG/DL — SIGNIFICANT CHANGE UP (ref 1.6–2.6)
MAGNESIUM SERPL-MCNC: 2.5 MG/DL — SIGNIFICANT CHANGE UP (ref 1.6–2.6)
MAGNESIUM SERPL-MCNC: 2.6 MG/DL — SIGNIFICANT CHANGE UP (ref 1.6–2.6)
MAGNESIUM SERPL-MCNC: 2.7 MG/DL — HIGH (ref 1.6–2.6)
MAGNESIUM SERPL-MCNC: 2.8 MG/DL — HIGH (ref 1.6–2.6)
MAGNESIUM SERPL-MCNC: 3.1 MG/DL — HIGH (ref 1.6–2.6)
MAGNESIUM SERPL-MCNC: 3.1 MG/DL — HIGH (ref 1.6–2.6)
MAGNESIUM SERPL-MCNC: 3.3 MG/DL — HIGH (ref 1.6–2.6)
MAGNESIUM SERPL-MCNC: 3.3 MG/DL — HIGH (ref 1.6–2.6)
MANUAL SMEAR VERIFICATION: SIGNIFICANT CHANGE UP
MCHC RBC-ENTMCNC: 27.6 GM/DL — LOW (ref 32–36)
MCHC RBC-ENTMCNC: 27.6 GM/DL — LOW (ref 32–36)
MCHC RBC-ENTMCNC: 27.9 GM/DL — LOW (ref 32–36)
MCHC RBC-ENTMCNC: 27.9 GM/DL — LOW (ref 32–36)
MCHC RBC-ENTMCNC: 28.1 GM/DL — LOW (ref 32–36)
MCHC RBC-ENTMCNC: 28.1 GM/DL — LOW (ref 32–36)
MCHC RBC-ENTMCNC: 28.2 PG — SIGNIFICANT CHANGE UP (ref 27–34)
MCHC RBC-ENTMCNC: 28.3 PG — SIGNIFICANT CHANGE UP (ref 27–34)
MCHC RBC-ENTMCNC: 28.4 GM/DL — LOW (ref 32–36)
MCHC RBC-ENTMCNC: 28.5 PG — SIGNIFICANT CHANGE UP (ref 27–34)
MCHC RBC-ENTMCNC: 28.6 GM/DL — LOW (ref 32–36)
MCHC RBC-ENTMCNC: 28.6 GM/DL — LOW (ref 32–36)
MCHC RBC-ENTMCNC: 28.6 PG — SIGNIFICANT CHANGE UP (ref 27–34)
MCHC RBC-ENTMCNC: 28.7 GM/DL — LOW (ref 32–36)
MCHC RBC-ENTMCNC: 28.7 GM/DL — LOW (ref 32–36)
MCHC RBC-ENTMCNC: 28.7 PG — SIGNIFICANT CHANGE UP (ref 27–34)
MCHC RBC-ENTMCNC: 28.8 GM/DL — LOW (ref 32–36)
MCHC RBC-ENTMCNC: 28.8 PG — SIGNIFICANT CHANGE UP (ref 27–34)
MCHC RBC-ENTMCNC: 28.9 PG — SIGNIFICANT CHANGE UP (ref 27–34)
MCHC RBC-ENTMCNC: 29 GM/DL — LOW (ref 32–36)
MCHC RBC-ENTMCNC: 29 PG — SIGNIFICANT CHANGE UP (ref 27–34)
MCHC RBC-ENTMCNC: 29.1 GM/DL — LOW (ref 32–36)
MCHC RBC-ENTMCNC: 29.1 PG — SIGNIFICANT CHANGE UP (ref 27–34)
MCHC RBC-ENTMCNC: 29.2 GM/DL — LOW (ref 32–36)
MCHC RBC-ENTMCNC: 29.2 PG — SIGNIFICANT CHANGE UP (ref 27–34)
MCHC RBC-ENTMCNC: 29.3 GM/DL — LOW (ref 32–36)
MCHC RBC-ENTMCNC: 29.3 GM/DL — LOW (ref 32–36)
MCHC RBC-ENTMCNC: 29.3 PG — SIGNIFICANT CHANGE UP (ref 27–34)
MCHC RBC-ENTMCNC: 29.4 GM/DL — LOW (ref 32–36)
MCHC RBC-ENTMCNC: 29.4 PG — SIGNIFICANT CHANGE UP (ref 27–34)
MCHC RBC-ENTMCNC: 29.5 GM/DL — LOW (ref 32–36)
MCHC RBC-ENTMCNC: 29.5 PG — SIGNIFICANT CHANGE UP (ref 27–34)
MCHC RBC-ENTMCNC: 29.6 GM/DL — LOW (ref 32–36)
MCHC RBC-ENTMCNC: 29.6 PG — SIGNIFICANT CHANGE UP (ref 27–34)
MCHC RBC-ENTMCNC: 29.7 GM/DL — LOW (ref 32–36)
MCHC RBC-ENTMCNC: 29.7 PG — SIGNIFICANT CHANGE UP (ref 27–34)
MCHC RBC-ENTMCNC: 29.8 GM/DL — LOW (ref 32–36)
MCHC RBC-ENTMCNC: 29.8 PG — SIGNIFICANT CHANGE UP (ref 27–34)
MCHC RBC-ENTMCNC: 29.9 GM/DL — LOW (ref 32–36)
MCHC RBC-ENTMCNC: 29.9 GM/DL — LOW (ref 32–36)
MCHC RBC-ENTMCNC: 29.9 PG — SIGNIFICANT CHANGE UP (ref 27–34)
MCHC RBC-ENTMCNC: 30 GM/DL — LOW (ref 32–36)
MCHC RBC-ENTMCNC: 30 PG — SIGNIFICANT CHANGE UP (ref 27–34)
MCHC RBC-ENTMCNC: 30.1 GM/DL — LOW (ref 32–36)
MCHC RBC-ENTMCNC: 30.1 GM/DL — LOW (ref 32–36)
MCHC RBC-ENTMCNC: 30.1 PG — SIGNIFICANT CHANGE UP (ref 27–34)
MCHC RBC-ENTMCNC: 30.2 GM/DL — LOW (ref 32–36)
MCHC RBC-ENTMCNC: 30.2 GM/DL — LOW (ref 32–36)
MCHC RBC-ENTMCNC: 30.2 PG — SIGNIFICANT CHANGE UP (ref 27–34)
MCHC RBC-ENTMCNC: 30.2 PG — SIGNIFICANT CHANGE UP (ref 27–34)
MCHC RBC-ENTMCNC: 30.3 GM/DL — LOW (ref 32–36)
MCHC RBC-ENTMCNC: 30.3 PG — SIGNIFICANT CHANGE UP (ref 27–34)
MCHC RBC-ENTMCNC: 30.4 GM/DL — LOW (ref 32–36)
MCHC RBC-ENTMCNC: 30.4 PG — SIGNIFICANT CHANGE UP (ref 27–34)
MCHC RBC-ENTMCNC: 30.5 GM/DL — LOW (ref 32–36)
MCHC RBC-ENTMCNC: 30.5 PG — SIGNIFICANT CHANGE UP (ref 27–34)
MCHC RBC-ENTMCNC: 30.6 GM/DL — LOW (ref 32–36)
MCHC RBC-ENTMCNC: 30.6 PG — SIGNIFICANT CHANGE UP (ref 27–34)
MCHC RBC-ENTMCNC: 30.6 PG — SIGNIFICANT CHANGE UP (ref 27–34)
MCHC RBC-ENTMCNC: 30.7 GM/DL — LOW (ref 32–36)
MCHC RBC-ENTMCNC: 30.7 PG — SIGNIFICANT CHANGE UP (ref 27–34)
MCHC RBC-ENTMCNC: 30.8 GM/DL — LOW (ref 32–36)
MCHC RBC-ENTMCNC: 31 GM/DL — LOW (ref 32–36)
MCHC RBC-ENTMCNC: 31.1 GM/DL — LOW (ref 32–36)
MCHC RBC-ENTMCNC: 31.2 GM/DL — LOW (ref 32–36)
MCHC RBC-ENTMCNC: 31.3 GM/DL — LOW (ref 32–36)
MCHC RBC-ENTMCNC: 31.4 GM/DL — LOW (ref 32–36)
MCHC RBC-ENTMCNC: 31.5 GM/DL — LOW (ref 32–36)
MCHC RBC-ENTMCNC: 31.6 GM/DL — LOW (ref 32–36)
MCHC RBC-ENTMCNC: 31.7 GM/DL — LOW (ref 32–36)
MCHC RBC-ENTMCNC: 31.8 GM/DL — LOW (ref 32–36)
MCHC RBC-ENTMCNC: 31.9 GM/DL — LOW (ref 32–36)
MCHC RBC-ENTMCNC: 32 GM/DL — SIGNIFICANT CHANGE UP (ref 32–36)
MCHC RBC-ENTMCNC: 32 GM/DL — SIGNIFICANT CHANGE UP (ref 32–36)
MCHC RBC-ENTMCNC: 32.1 GM/DL — SIGNIFICANT CHANGE UP (ref 32–36)
MCHC RBC-ENTMCNC: 32.2 GM/DL — SIGNIFICANT CHANGE UP (ref 32–36)
MCHC RBC-ENTMCNC: 32.3 GM/DL — SIGNIFICANT CHANGE UP (ref 32–36)
MCHC RBC-ENTMCNC: 32.4 GM/DL — SIGNIFICANT CHANGE UP (ref 32–36)
MCHC RBC-ENTMCNC: 32.4 GM/DL — SIGNIFICANT CHANGE UP (ref 32–36)
MCHC RBC-ENTMCNC: 32.5 GM/DL — SIGNIFICANT CHANGE UP (ref 32–36)
MCHC RBC-ENTMCNC: 32.6 GM/DL — SIGNIFICANT CHANGE UP (ref 32–36)
MCHC RBC-ENTMCNC: 32.7 GM/DL — SIGNIFICANT CHANGE UP (ref 32–36)
MCHC RBC-ENTMCNC: 32.7 GM/DL — SIGNIFICANT CHANGE UP (ref 32–36)
MCHC RBC-ENTMCNC: 32.8 GM/DL — SIGNIFICANT CHANGE UP (ref 32–36)
MCHC RBC-ENTMCNC: 32.9 GM/DL — SIGNIFICANT CHANGE UP (ref 32–36)
MCHC RBC-ENTMCNC: 33 GM/DL — SIGNIFICANT CHANGE UP (ref 32–36)
MCHC RBC-ENTMCNC: 33 GM/DL — SIGNIFICANT CHANGE UP (ref 32–36)
MCHC RBC-ENTMCNC: 33.1 GM/DL — SIGNIFICANT CHANGE UP (ref 32–36)
MCHC RBC-ENTMCNC: 33.2 GM/DL — SIGNIFICANT CHANGE UP (ref 32–36)
MCHC RBC-ENTMCNC: 33.3 GM/DL — SIGNIFICANT CHANGE UP (ref 32–36)
MCHC RBC-ENTMCNC: 33.3 GM/DL — SIGNIFICANT CHANGE UP (ref 32–36)
MCV RBC AUTO: 100 FL — SIGNIFICANT CHANGE UP (ref 80–100)
MCV RBC AUTO: 100.3 FL — HIGH (ref 80–100)
MCV RBC AUTO: 100.3 FL — HIGH (ref 80–100)
MCV RBC AUTO: 100.7 FL — HIGH (ref 80–100)
MCV RBC AUTO: 100.7 FL — HIGH (ref 80–100)
MCV RBC AUTO: 100.8 FL — HIGH (ref 80–100)
MCV RBC AUTO: 101 FL — HIGH (ref 80–100)
MCV RBC AUTO: 101.3 FL — HIGH (ref 80–100)
MCV RBC AUTO: 101.3 FL — HIGH (ref 80–100)
MCV RBC AUTO: 101.4 FL — HIGH (ref 80–100)
MCV RBC AUTO: 101.8 FL — HIGH (ref 80–100)
MCV RBC AUTO: 101.8 FL — HIGH (ref 80–100)
MCV RBC AUTO: 102 FL — HIGH (ref 80–100)
MCV RBC AUTO: 102 FL — HIGH (ref 80–100)
MCV RBC AUTO: 102.3 FL — HIGH (ref 80–100)
MCV RBC AUTO: 102.3 FL — HIGH (ref 80–100)
MCV RBC AUTO: 102.9 FL — HIGH (ref 80–100)
MCV RBC AUTO: 102.9 FL — HIGH (ref 80–100)
MCV RBC AUTO: 103.1 FL — HIGH (ref 80–100)
MCV RBC AUTO: 103.1 FL — HIGH (ref 80–100)
MCV RBC AUTO: 103.3 FL — HIGH (ref 80–100)
MCV RBC AUTO: 103.3 FL — HIGH (ref 80–100)
MCV RBC AUTO: 103.4 FL — HIGH (ref 80–100)
MCV RBC AUTO: 103.4 FL — HIGH (ref 80–100)
MCV RBC AUTO: 103.5 FL — HIGH (ref 80–100)
MCV RBC AUTO: 103.5 FL — HIGH (ref 80–100)
MCV RBC AUTO: 104.2 FL — HIGH (ref 80–100)
MCV RBC AUTO: 104.2 FL — HIGH (ref 80–100)
MCV RBC AUTO: 86.5 FL — SIGNIFICANT CHANGE UP (ref 80–100)
MCV RBC AUTO: 87.2 FL — SIGNIFICANT CHANGE UP (ref 80–100)
MCV RBC AUTO: 87.6 FL — SIGNIFICANT CHANGE UP (ref 80–100)
MCV RBC AUTO: 87.7 FL — SIGNIFICANT CHANGE UP (ref 80–100)
MCV RBC AUTO: 87.7 FL — SIGNIFICANT CHANGE UP (ref 80–100)
MCV RBC AUTO: 88.1 FL — SIGNIFICANT CHANGE UP (ref 80–100)
MCV RBC AUTO: 88.4 FL — SIGNIFICANT CHANGE UP (ref 80–100)
MCV RBC AUTO: 88.5 FL — SIGNIFICANT CHANGE UP (ref 80–100)
MCV RBC AUTO: 88.6 FL — SIGNIFICANT CHANGE UP (ref 80–100)
MCV RBC AUTO: 88.9 FL — SIGNIFICANT CHANGE UP (ref 80–100)
MCV RBC AUTO: 89.3 FL — SIGNIFICANT CHANGE UP (ref 80–100)
MCV RBC AUTO: 89.5 FL — SIGNIFICANT CHANGE UP (ref 80–100)
MCV RBC AUTO: 89.5 FL — SIGNIFICANT CHANGE UP (ref 80–100)
MCV RBC AUTO: 89.6 FL — SIGNIFICANT CHANGE UP (ref 80–100)
MCV RBC AUTO: 90.1 FL — SIGNIFICANT CHANGE UP (ref 80–100)
MCV RBC AUTO: 90.4 FL — SIGNIFICANT CHANGE UP (ref 80–100)
MCV RBC AUTO: 90.4 FL — SIGNIFICANT CHANGE UP (ref 80–100)
MCV RBC AUTO: 90.7 FL — SIGNIFICANT CHANGE UP (ref 80–100)
MCV RBC AUTO: 90.8 FL — SIGNIFICANT CHANGE UP (ref 80–100)
MCV RBC AUTO: 90.8 FL — SIGNIFICANT CHANGE UP (ref 80–100)
MCV RBC AUTO: 91 FL — SIGNIFICANT CHANGE UP (ref 80–100)
MCV RBC AUTO: 91.2 FL — SIGNIFICANT CHANGE UP (ref 80–100)
MCV RBC AUTO: 91.3 FL — SIGNIFICANT CHANGE UP (ref 80–100)
MCV RBC AUTO: 91.4 FL — SIGNIFICANT CHANGE UP (ref 80–100)
MCV RBC AUTO: 91.5 FL — SIGNIFICANT CHANGE UP (ref 80–100)
MCV RBC AUTO: 91.5 FL — SIGNIFICANT CHANGE UP (ref 80–100)
MCV RBC AUTO: 91.6 FL — SIGNIFICANT CHANGE UP (ref 80–100)
MCV RBC AUTO: 91.7 FL — SIGNIFICANT CHANGE UP (ref 80–100)
MCV RBC AUTO: 91.8 FL — SIGNIFICANT CHANGE UP (ref 80–100)
MCV RBC AUTO: 91.8 FL — SIGNIFICANT CHANGE UP (ref 80–100)
MCV RBC AUTO: 91.9 FL — SIGNIFICANT CHANGE UP (ref 80–100)
MCV RBC AUTO: 92 FL — SIGNIFICANT CHANGE UP (ref 80–100)
MCV RBC AUTO: 92 FL — SIGNIFICANT CHANGE UP (ref 80–100)
MCV RBC AUTO: 92.1 FL — SIGNIFICANT CHANGE UP (ref 80–100)
MCV RBC AUTO: 92.1 FL — SIGNIFICANT CHANGE UP (ref 80–100)
MCV RBC AUTO: 92.2 FL — SIGNIFICANT CHANGE UP (ref 80–100)
MCV RBC AUTO: 92.4 FL — SIGNIFICANT CHANGE UP (ref 80–100)
MCV RBC AUTO: 92.6 FL — SIGNIFICANT CHANGE UP (ref 80–100)
MCV RBC AUTO: 92.7 FL — SIGNIFICANT CHANGE UP (ref 80–100)
MCV RBC AUTO: 92.8 FL — SIGNIFICANT CHANGE UP (ref 80–100)
MCV RBC AUTO: 92.9 FL — SIGNIFICANT CHANGE UP (ref 80–100)
MCV RBC AUTO: 93 FL — SIGNIFICANT CHANGE UP (ref 80–100)
MCV RBC AUTO: 93.1 FL — SIGNIFICANT CHANGE UP (ref 80–100)
MCV RBC AUTO: 93.1 FL — SIGNIFICANT CHANGE UP (ref 80–100)
MCV RBC AUTO: 93.3 FL — SIGNIFICANT CHANGE UP (ref 80–100)
MCV RBC AUTO: 93.4 FL — SIGNIFICANT CHANGE UP (ref 80–100)
MCV RBC AUTO: 93.4 FL — SIGNIFICANT CHANGE UP (ref 80–100)
MCV RBC AUTO: 93.5 FL — SIGNIFICANT CHANGE UP (ref 80–100)
MCV RBC AUTO: 93.6 FL — SIGNIFICANT CHANGE UP (ref 80–100)
MCV RBC AUTO: 93.7 FL — SIGNIFICANT CHANGE UP (ref 80–100)
MCV RBC AUTO: 93.8 FL — SIGNIFICANT CHANGE UP (ref 80–100)
MCV RBC AUTO: 93.9 FL — SIGNIFICANT CHANGE UP (ref 80–100)
MCV RBC AUTO: 94.1 FL — SIGNIFICANT CHANGE UP (ref 80–100)
MCV RBC AUTO: 94.1 FL — SIGNIFICANT CHANGE UP (ref 80–100)
MCV RBC AUTO: 94.2 FL — SIGNIFICANT CHANGE UP (ref 80–100)
MCV RBC AUTO: 94.2 FL — SIGNIFICANT CHANGE UP (ref 80–100)
MCV RBC AUTO: 94.3 FL — SIGNIFICANT CHANGE UP (ref 80–100)
MCV RBC AUTO: 94.6 FL — SIGNIFICANT CHANGE UP (ref 80–100)
MCV RBC AUTO: 94.7 FL — SIGNIFICANT CHANGE UP (ref 80–100)
MCV RBC AUTO: 95 FL — SIGNIFICANT CHANGE UP (ref 80–100)
MCV RBC AUTO: 95.1 FL — SIGNIFICANT CHANGE UP (ref 80–100)
MCV RBC AUTO: 95.1 FL — SIGNIFICANT CHANGE UP (ref 80–100)
MCV RBC AUTO: 95.2 FL — SIGNIFICANT CHANGE UP (ref 80–100)
MCV RBC AUTO: 95.2 FL — SIGNIFICANT CHANGE UP (ref 80–100)
MCV RBC AUTO: 95.6 FL — SIGNIFICANT CHANGE UP (ref 80–100)
MCV RBC AUTO: 95.7 FL — SIGNIFICANT CHANGE UP (ref 80–100)
MCV RBC AUTO: 95.8 FL — SIGNIFICANT CHANGE UP (ref 80–100)
MCV RBC AUTO: 95.9 FL — SIGNIFICANT CHANGE UP (ref 80–100)
MCV RBC AUTO: 96 FL — SIGNIFICANT CHANGE UP (ref 80–100)
MCV RBC AUTO: 96.1 FL — SIGNIFICANT CHANGE UP (ref 80–100)
MCV RBC AUTO: 96.2 FL — SIGNIFICANT CHANGE UP (ref 80–100)
MCV RBC AUTO: 96.3 FL — SIGNIFICANT CHANGE UP (ref 80–100)
MCV RBC AUTO: 96.5 FL — SIGNIFICANT CHANGE UP (ref 80–100)
MCV RBC AUTO: 96.7 FL — SIGNIFICANT CHANGE UP (ref 80–100)
MCV RBC AUTO: 96.7 FL — SIGNIFICANT CHANGE UP (ref 80–100)
MCV RBC AUTO: 96.8 FL — SIGNIFICANT CHANGE UP (ref 80–100)
MCV RBC AUTO: 97.2 FL — SIGNIFICANT CHANGE UP (ref 80–100)
MCV RBC AUTO: 97.5 FL — SIGNIFICANT CHANGE UP (ref 80–100)
MCV RBC AUTO: 97.5 FL — SIGNIFICANT CHANGE UP (ref 80–100)
MCV RBC AUTO: 97.7 FL — SIGNIFICANT CHANGE UP (ref 80–100)
MCV RBC AUTO: 97.7 FL — SIGNIFICANT CHANGE UP (ref 80–100)
MCV RBC AUTO: 97.8 FL — SIGNIFICANT CHANGE UP (ref 80–100)
MCV RBC AUTO: 97.8 FL — SIGNIFICANT CHANGE UP (ref 80–100)
MCV RBC AUTO: 97.9 FL — SIGNIFICANT CHANGE UP (ref 80–100)
MCV RBC AUTO: 98 FL — SIGNIFICANT CHANGE UP (ref 80–100)
MCV RBC AUTO: 98 FL — SIGNIFICANT CHANGE UP (ref 80–100)
MCV RBC AUTO: 98.1 FL — SIGNIFICANT CHANGE UP (ref 80–100)
MCV RBC AUTO: 98.4 FL — SIGNIFICANT CHANGE UP (ref 80–100)
MCV RBC AUTO: 98.4 FL — SIGNIFICANT CHANGE UP (ref 80–100)
MCV RBC AUTO: 98.7 FL — SIGNIFICANT CHANGE UP (ref 80–100)
MCV RBC AUTO: 99 FL — SIGNIFICANT CHANGE UP (ref 80–100)
MCV RBC AUTO: 99 FL — SIGNIFICANT CHANGE UP (ref 80–100)
MCV RBC AUTO: 99.1 FL — SIGNIFICANT CHANGE UP (ref 80–100)
MCV RBC AUTO: 99.3 FL — SIGNIFICANT CHANGE UP (ref 80–100)
MCV RBC AUTO: 99.6 FL — SIGNIFICANT CHANGE UP (ref 80–100)
MCV RBC AUTO: 99.6 FL — SIGNIFICANT CHANGE UP (ref 80–100)
MCV RBC AUTO: 99.7 FL — SIGNIFICANT CHANGE UP (ref 80–100)
MESOTHL CELL # FLD: 0 % — SIGNIFICANT CHANGE UP
MESOTHL CELL # FLD: 0 % — SIGNIFICANT CHANGE UP
METAMYELOCYTES # FLD: 0.9 % — SIGNIFICANT CHANGE UP (ref 0–1)
METAMYELOCYTES # FLD: 0.9 % — SIGNIFICANT CHANGE UP (ref 0–1)
METAMYELOCYTES # FLD: 1 % — SIGNIFICANT CHANGE UP (ref 0–1)
METAMYELOCYTES # FLD: 1.7 % — HIGH (ref 0–1)
METAMYELOCYTES # FLD: 2.6 % — HIGH (ref 0–1)
METAMYELOCYTES # FLD: 3.5 % — HIGH (ref 0–1)
METHOD TYPE: SIGNIFICANT CHANGE UP
MICROCYTES BLD QL: SLIGHT — SIGNIFICANT CHANGE UP
MONOCYTES # BLD AUTO: 0.17 K/UL — SIGNIFICANT CHANGE UP (ref 0–0.9)
MONOCYTES # BLD AUTO: 0.22 K/UL — SIGNIFICANT CHANGE UP (ref 0–0.9)
MONOCYTES # BLD AUTO: 0.24 K/UL — SIGNIFICANT CHANGE UP (ref 0–0.9)
MONOCYTES # BLD AUTO: 0.29 K/UL — SIGNIFICANT CHANGE UP (ref 0–0.9)
MONOCYTES # BLD AUTO: 0.29 K/UL — SIGNIFICANT CHANGE UP (ref 0–0.9)
MONOCYTES # BLD AUTO: 0.3 K/UL — SIGNIFICANT CHANGE UP (ref 0–0.9)
MONOCYTES # BLD AUTO: 0.3 K/UL — SIGNIFICANT CHANGE UP (ref 0–0.9)
MONOCYTES # BLD AUTO: 0.32 K/UL — SIGNIFICANT CHANGE UP (ref 0–0.9)
MONOCYTES # BLD AUTO: 0.33 K/UL — SIGNIFICANT CHANGE UP (ref 0–0.9)
MONOCYTES # BLD AUTO: 0.37 K/UL — SIGNIFICANT CHANGE UP (ref 0–0.9)
MONOCYTES # BLD AUTO: 0.38 K/UL — SIGNIFICANT CHANGE UP (ref 0–0.9)
MONOCYTES # BLD AUTO: 0.4 K/UL — SIGNIFICANT CHANGE UP (ref 0–0.9)
MONOCYTES # BLD AUTO: 0.41 K/UL — SIGNIFICANT CHANGE UP (ref 0–0.9)
MONOCYTES # BLD AUTO: 0.41 K/UL — SIGNIFICANT CHANGE UP (ref 0–0.9)
MONOCYTES # BLD AUTO: 0.42 K/UL — SIGNIFICANT CHANGE UP (ref 0–0.9)
MONOCYTES # BLD AUTO: 0.45 K/UL — SIGNIFICANT CHANGE UP (ref 0–0.9)
MONOCYTES # BLD AUTO: 0.47 K/UL — SIGNIFICANT CHANGE UP (ref 0–0.9)
MONOCYTES # BLD AUTO: 0.48 K/UL — SIGNIFICANT CHANGE UP (ref 0–0.9)
MONOCYTES # BLD AUTO: 0.51 K/UL — SIGNIFICANT CHANGE UP (ref 0–0.9)
MONOCYTES # BLD AUTO: 0.52 K/UL — SIGNIFICANT CHANGE UP (ref 0–0.9)
MONOCYTES # BLD AUTO: 0.54 K/UL — SIGNIFICANT CHANGE UP (ref 0–0.9)
MONOCYTES # BLD AUTO: 0.56 K/UL — SIGNIFICANT CHANGE UP (ref 0–0.9)
MONOCYTES # BLD AUTO: 0.6 K/UL — SIGNIFICANT CHANGE UP (ref 0–0.9)
MONOCYTES # BLD AUTO: 0.62 K/UL — SIGNIFICANT CHANGE UP (ref 0–0.9)
MONOCYTES # BLD AUTO: 0.63 K/UL — SIGNIFICANT CHANGE UP (ref 0–0.9)
MONOCYTES # BLD AUTO: 0.65 K/UL — SIGNIFICANT CHANGE UP (ref 0–0.9)
MONOCYTES # BLD AUTO: 0.66 K/UL — SIGNIFICANT CHANGE UP (ref 0–0.9)
MONOCYTES # BLD AUTO: 0.67 K/UL — SIGNIFICANT CHANGE UP (ref 0–0.9)
MONOCYTES # BLD AUTO: 0.69 K/UL — SIGNIFICANT CHANGE UP (ref 0–0.9)
MONOCYTES # BLD AUTO: 0.72 K/UL — SIGNIFICANT CHANGE UP (ref 0–0.9)
MONOCYTES # BLD AUTO: 0.74 K/UL — SIGNIFICANT CHANGE UP (ref 0–0.9)
MONOCYTES # BLD AUTO: 0.76 K/UL — SIGNIFICANT CHANGE UP (ref 0–0.9)
MONOCYTES # BLD AUTO: 0.77 K/UL — SIGNIFICANT CHANGE UP (ref 0–0.9)
MONOCYTES # BLD AUTO: 0.8 K/UL — SIGNIFICANT CHANGE UP (ref 0–0.9)
MONOCYTES # BLD AUTO: 0.85 K/UL — SIGNIFICANT CHANGE UP (ref 0–0.9)
MONOCYTES # BLD AUTO: 0.85 K/UL — SIGNIFICANT CHANGE UP (ref 0–0.9)
MONOCYTES # BLD AUTO: 0.89 K/UL — SIGNIFICANT CHANGE UP (ref 0–0.9)
MONOCYTES # BLD AUTO: 0.9 K/UL — SIGNIFICANT CHANGE UP (ref 0–0.9)
MONOCYTES # BLD AUTO: 0.91 K/UL — HIGH (ref 0–0.9)
MONOCYTES # BLD AUTO: 0.92 K/UL — HIGH (ref 0–0.9)
MONOCYTES # BLD AUTO: 0.94 K/UL — HIGH (ref 0–0.9)
MONOCYTES # BLD AUTO: 0.94 K/UL — HIGH (ref 0–0.9)
MONOCYTES # BLD AUTO: 0.95 K/UL — HIGH (ref 0–0.9)
MONOCYTES # BLD AUTO: 0.95 K/UL — HIGH (ref 0–0.9)
MONOCYTES # BLD AUTO: 0.97 K/UL — HIGH (ref 0–0.9)
MONOCYTES # BLD AUTO: 0.97 K/UL — HIGH (ref 0–0.9)
MONOCYTES # BLD AUTO: 1.03 K/UL — HIGH (ref 0–0.9)
MONOCYTES # BLD AUTO: 1.03 K/UL — HIGH (ref 0–0.9)
MONOCYTES # BLD AUTO: 1.04 K/UL — HIGH (ref 0–0.9)
MONOCYTES # BLD AUTO: 1.05 K/UL — HIGH (ref 0–0.9)
MONOCYTES # BLD AUTO: 1.05 K/UL — HIGH (ref 0–0.9)
MONOCYTES # BLD AUTO: 1.06 K/UL — HIGH (ref 0–0.9)
MONOCYTES # BLD AUTO: 1.06 K/UL — HIGH (ref 0–0.9)
MONOCYTES # BLD AUTO: 1.07 K/UL — HIGH (ref 0–0.9)
MONOCYTES # BLD AUTO: 1.09 K/UL — HIGH (ref 0–0.9)
MONOCYTES # BLD AUTO: 1.09 K/UL — HIGH (ref 0–0.9)
MONOCYTES # BLD AUTO: 1.12 K/UL — HIGH (ref 0–0.9)
MONOCYTES # BLD AUTO: 1.13 K/UL — HIGH (ref 0–0.9)
MONOCYTES # BLD AUTO: 1.17 K/UL — HIGH (ref 0–0.9)
MONOCYTES # BLD AUTO: 1.19 K/UL — HIGH (ref 0–0.9)
MONOCYTES # BLD AUTO: 1.2 K/UL — HIGH (ref 0–0.9)
MONOCYTES # BLD AUTO: 1.21 K/UL — HIGH (ref 0–0.9)
MONOCYTES # BLD AUTO: 1.21 K/UL — HIGH (ref 0–0.9)
MONOCYTES # BLD AUTO: 1.25 K/UL — HIGH (ref 0–0.9)
MONOCYTES # BLD AUTO: 1.27 K/UL — HIGH (ref 0–0.9)
MONOCYTES # BLD AUTO: 1.27 K/UL — HIGH (ref 0–0.9)
MONOCYTES # BLD AUTO: 1.35 K/UL — HIGH (ref 0–0.9)
MONOCYTES # BLD AUTO: 1.36 K/UL — HIGH (ref 0–0.9)
MONOCYTES # BLD AUTO: 1.36 K/UL — HIGH (ref 0–0.9)
MONOCYTES # BLD AUTO: 1.37 K/UL — HIGH (ref 0–0.9)
MONOCYTES # BLD AUTO: 1.37 K/UL — HIGH (ref 0–0.9)
MONOCYTES # BLD AUTO: 1.38 K/UL — HIGH (ref 0–0.9)
MONOCYTES # BLD AUTO: 1.4 K/UL — HIGH (ref 0–0.9)
MONOCYTES # BLD AUTO: 1.44 K/UL — HIGH (ref 0–0.9)
MONOCYTES # BLD AUTO: 1.47 K/UL — HIGH (ref 0–0.9)
MONOCYTES # BLD AUTO: 1.48 K/UL — HIGH (ref 0–0.9)
MONOCYTES # BLD AUTO: 1.48 K/UL — HIGH (ref 0–0.9)
MONOCYTES # BLD AUTO: 1.49 K/UL — HIGH (ref 0–0.9)
MONOCYTES # BLD AUTO: 1.49 K/UL — HIGH (ref 0–0.9)
MONOCYTES # BLD AUTO: 1.5 K/UL — HIGH (ref 0–0.9)
MONOCYTES # BLD AUTO: 1.52 K/UL — HIGH (ref 0–0.9)
MONOCYTES # BLD AUTO: 1.52 K/UL — HIGH (ref 0–0.9)
MONOCYTES # BLD AUTO: 1.54 K/UL — HIGH (ref 0–0.9)
MONOCYTES # BLD AUTO: 1.55 K/UL — HIGH (ref 0–0.9)
MONOCYTES # BLD AUTO: 1.55 K/UL — HIGH (ref 0–0.9)
MONOCYTES # BLD AUTO: 1.59 K/UL — HIGH (ref 0–0.9)
MONOCYTES # BLD AUTO: 1.64 K/UL — HIGH (ref 0–0.9)
MONOCYTES # BLD AUTO: 1.64 K/UL — HIGH (ref 0–0.9)
MONOCYTES # BLD AUTO: 1.66 K/UL — HIGH (ref 0–0.9)
MONOCYTES # BLD AUTO: 1.66 K/UL — HIGH (ref 0–0.9)
MONOCYTES # BLD AUTO: 1.67 K/UL — HIGH (ref 0–0.9)
MONOCYTES # BLD AUTO: 1.67 K/UL — HIGH (ref 0–0.9)
MONOCYTES # BLD AUTO: 1.69 K/UL — HIGH (ref 0–0.9)
MONOCYTES # BLD AUTO: 1.69 K/UL — HIGH (ref 0–0.9)
MONOCYTES # BLD AUTO: 1.72 K/UL — HIGH (ref 0–0.9)
MONOCYTES # BLD AUTO: 1.73 K/UL — HIGH (ref 0–0.9)
MONOCYTES # BLD AUTO: 1.99 K/UL — HIGH (ref 0–0.9)
MONOCYTES # BLD AUTO: 2.04 K/UL — HIGH (ref 0–0.9)
MONOCYTES # BLD AUTO: 2.04 K/UL — HIGH (ref 0–0.9)
MONOCYTES # BLD AUTO: 2.05 K/UL — HIGH (ref 0–0.9)
MONOCYTES # BLD AUTO: 2.05 K/UL — HIGH (ref 0–0.9)
MONOCYTES # BLD AUTO: 3.13 K/UL — HIGH (ref 0–0.9)
MONOCYTES # BLD AUTO: 3.13 K/UL — HIGH (ref 0–0.9)
MONOCYTES NFR BLD AUTO: 1.7 % — LOW (ref 2–14)
MONOCYTES NFR BLD AUTO: 1.8 % — LOW (ref 2–14)
MONOCYTES NFR BLD AUTO: 10.1 % — SIGNIFICANT CHANGE UP (ref 2–14)
MONOCYTES NFR BLD AUTO: 10.3 % — SIGNIFICANT CHANGE UP (ref 2–14)
MONOCYTES NFR BLD AUTO: 10.4 % — SIGNIFICANT CHANGE UP (ref 2–14)
MONOCYTES NFR BLD AUTO: 10.6 % — SIGNIFICANT CHANGE UP (ref 2–14)
MONOCYTES NFR BLD AUTO: 10.8 % — SIGNIFICANT CHANGE UP (ref 2–14)
MONOCYTES NFR BLD AUTO: 10.8 % — SIGNIFICANT CHANGE UP (ref 2–14)
MONOCYTES NFR BLD AUTO: 10.9 % — SIGNIFICANT CHANGE UP (ref 2–14)
MONOCYTES NFR BLD AUTO: 11.2 % — SIGNIFICANT CHANGE UP (ref 2–14)
MONOCYTES NFR BLD AUTO: 11.2 % — SIGNIFICANT CHANGE UP (ref 2–14)
MONOCYTES NFR BLD AUTO: 11.4 % — SIGNIFICANT CHANGE UP (ref 2–14)
MONOCYTES NFR BLD AUTO: 11.9 % — SIGNIFICANT CHANGE UP (ref 2–14)
MONOCYTES NFR BLD AUTO: 12.1 % — SIGNIFICANT CHANGE UP (ref 2–14)
MONOCYTES NFR BLD AUTO: 12.1 % — SIGNIFICANT CHANGE UP (ref 2–14)
MONOCYTES NFR BLD AUTO: 12.3 % — SIGNIFICANT CHANGE UP (ref 2–14)
MONOCYTES NFR BLD AUTO: 12.3 % — SIGNIFICANT CHANGE UP (ref 2–14)
MONOCYTES NFR BLD AUTO: 12.6 % — SIGNIFICANT CHANGE UP (ref 2–14)
MONOCYTES NFR BLD AUTO: 12.6 % — SIGNIFICANT CHANGE UP (ref 2–14)
MONOCYTES NFR BLD AUTO: 12.8 % — SIGNIFICANT CHANGE UP (ref 2–14)
MONOCYTES NFR BLD AUTO: 13.5 % — SIGNIFICANT CHANGE UP (ref 2–14)
MONOCYTES NFR BLD AUTO: 13.5 % — SIGNIFICANT CHANGE UP (ref 2–14)
MONOCYTES NFR BLD AUTO: 15.2 % — HIGH (ref 2–14)
MONOCYTES NFR BLD AUTO: 2.5 % — SIGNIFICANT CHANGE UP (ref 2–14)
MONOCYTES NFR BLD AUTO: 2.7 % — SIGNIFICANT CHANGE UP (ref 2–14)
MONOCYTES NFR BLD AUTO: 2.7 % — SIGNIFICANT CHANGE UP (ref 2–14)
MONOCYTES NFR BLD AUTO: 2.8 % — SIGNIFICANT CHANGE UP (ref 2–14)
MONOCYTES NFR BLD AUTO: 2.8 % — SIGNIFICANT CHANGE UP (ref 2–14)
MONOCYTES NFR BLD AUTO: 3 % — SIGNIFICANT CHANGE UP (ref 2–14)
MONOCYTES NFR BLD AUTO: 3.3 % — SIGNIFICANT CHANGE UP (ref 2–14)
MONOCYTES NFR BLD AUTO: 3.4 % — SIGNIFICANT CHANGE UP (ref 2–14)
MONOCYTES NFR BLD AUTO: 3.5 % — SIGNIFICANT CHANGE UP (ref 2–14)
MONOCYTES NFR BLD AUTO: 3.5 % — SIGNIFICANT CHANGE UP (ref 2–14)
MONOCYTES NFR BLD AUTO: 3.6 % — SIGNIFICANT CHANGE UP (ref 2–14)
MONOCYTES NFR BLD AUTO: 4.1 % — SIGNIFICANT CHANGE UP (ref 2–14)
MONOCYTES NFR BLD AUTO: 4.1 % — SIGNIFICANT CHANGE UP (ref 2–14)
MONOCYTES NFR BLD AUTO: 4.3 % — SIGNIFICANT CHANGE UP (ref 2–14)
MONOCYTES NFR BLD AUTO: 4.3 % — SIGNIFICANT CHANGE UP (ref 2–14)
MONOCYTES NFR BLD AUTO: 4.4 % — SIGNIFICANT CHANGE UP (ref 2–14)
MONOCYTES NFR BLD AUTO: 4.6 % — SIGNIFICANT CHANGE UP (ref 2–14)
MONOCYTES NFR BLD AUTO: 4.6 % — SIGNIFICANT CHANGE UP (ref 2–14)
MONOCYTES NFR BLD AUTO: 5.1 % — SIGNIFICANT CHANGE UP (ref 2–14)
MONOCYTES NFR BLD AUTO: 5.2 % — SIGNIFICANT CHANGE UP (ref 2–14)
MONOCYTES NFR BLD AUTO: 5.3 % — SIGNIFICANT CHANGE UP (ref 2–14)
MONOCYTES NFR BLD AUTO: 5.5 % — SIGNIFICANT CHANGE UP (ref 2–14)
MONOCYTES NFR BLD AUTO: 5.5 % — SIGNIFICANT CHANGE UP (ref 2–14)
MONOCYTES NFR BLD AUTO: 5.6 % — SIGNIFICANT CHANGE UP (ref 2–14)
MONOCYTES NFR BLD AUTO: 5.7 % — SIGNIFICANT CHANGE UP (ref 2–14)
MONOCYTES NFR BLD AUTO: 5.8 % — SIGNIFICANT CHANGE UP (ref 2–14)
MONOCYTES NFR BLD AUTO: 6 % — SIGNIFICANT CHANGE UP (ref 2–14)
MONOCYTES NFR BLD AUTO: 6 % — SIGNIFICANT CHANGE UP (ref 2–14)
MONOCYTES NFR BLD AUTO: 6.1 % — SIGNIFICANT CHANGE UP (ref 2–14)
MONOCYTES NFR BLD AUTO: 6.2 % — SIGNIFICANT CHANGE UP (ref 2–14)
MONOCYTES NFR BLD AUTO: 6.2 % — SIGNIFICANT CHANGE UP (ref 2–14)
MONOCYTES NFR BLD AUTO: 6.3 % — SIGNIFICANT CHANGE UP (ref 2–14)
MONOCYTES NFR BLD AUTO: 6.3 % — SIGNIFICANT CHANGE UP (ref 2–14)
MONOCYTES NFR BLD AUTO: 6.4 % — SIGNIFICANT CHANGE UP (ref 2–14)
MONOCYTES NFR BLD AUTO: 6.4 % — SIGNIFICANT CHANGE UP (ref 2–14)
MONOCYTES NFR BLD AUTO: 6.7 % — SIGNIFICANT CHANGE UP (ref 2–14)
MONOCYTES NFR BLD AUTO: 6.8 % — SIGNIFICANT CHANGE UP (ref 2–14)
MONOCYTES NFR BLD AUTO: 6.9 % — SIGNIFICANT CHANGE UP (ref 2–14)
MONOCYTES NFR BLD AUTO: 7 % — SIGNIFICANT CHANGE UP (ref 2–14)
MONOCYTES NFR BLD AUTO: 7.1 % — SIGNIFICANT CHANGE UP (ref 2–14)
MONOCYTES NFR BLD AUTO: 7.3 % — SIGNIFICANT CHANGE UP (ref 2–14)
MONOCYTES NFR BLD AUTO: 7.6 % — SIGNIFICANT CHANGE UP (ref 2–14)
MONOCYTES NFR BLD AUTO: 7.7 % — SIGNIFICANT CHANGE UP (ref 2–14)
MONOCYTES NFR BLD AUTO: 7.7 % — SIGNIFICANT CHANGE UP (ref 2–14)
MONOCYTES NFR BLD AUTO: 7.8 % — SIGNIFICANT CHANGE UP (ref 2–14)
MONOCYTES NFR BLD AUTO: 7.9 % — SIGNIFICANT CHANGE UP (ref 2–14)
MONOCYTES NFR BLD AUTO: 8 % — SIGNIFICANT CHANGE UP (ref 2–14)
MONOCYTES NFR BLD AUTO: 8.1 % — SIGNIFICANT CHANGE UP (ref 2–14)
MONOCYTES NFR BLD AUTO: 8.2 % — SIGNIFICANT CHANGE UP (ref 2–14)
MONOCYTES NFR BLD AUTO: 8.2 % — SIGNIFICANT CHANGE UP (ref 2–14)
MONOCYTES NFR BLD AUTO: 8.3 % — SIGNIFICANT CHANGE UP (ref 2–14)
MONOCYTES NFR BLD AUTO: 8.4 % — SIGNIFICANT CHANGE UP (ref 2–14)
MONOCYTES NFR BLD AUTO: 8.5 % — SIGNIFICANT CHANGE UP (ref 2–14)
MONOCYTES NFR BLD AUTO: 8.6 % — SIGNIFICANT CHANGE UP (ref 2–14)
MONOCYTES NFR BLD AUTO: 8.6 % — SIGNIFICANT CHANGE UP (ref 2–14)
MONOCYTES NFR BLD AUTO: 8.7 % — SIGNIFICANT CHANGE UP (ref 2–14)
MONOCYTES NFR BLD AUTO: 8.8 % — SIGNIFICANT CHANGE UP (ref 2–14)
MONOCYTES NFR BLD AUTO: 8.8 % — SIGNIFICANT CHANGE UP (ref 2–14)
MONOCYTES NFR BLD AUTO: 8.9 % — SIGNIFICANT CHANGE UP (ref 2–14)
MONOCYTES NFR BLD AUTO: 9 % — SIGNIFICANT CHANGE UP (ref 2–14)
MONOCYTES NFR BLD AUTO: 9.2 % — SIGNIFICANT CHANGE UP (ref 2–14)
MONOCYTES NFR BLD AUTO: 9.3 % — SIGNIFICANT CHANGE UP (ref 2–14)
MONOCYTES NFR BLD AUTO: 9.3 % — SIGNIFICANT CHANGE UP (ref 2–14)
MONOCYTES NFR BLD AUTO: 9.4 % — SIGNIFICANT CHANGE UP (ref 2–14)
MONOCYTES NFR BLD AUTO: 9.5 % — SIGNIFICANT CHANGE UP (ref 2–14)
MONOCYTES NFR BLD AUTO: 9.6 % — SIGNIFICANT CHANGE UP (ref 2–14)
MONOCYTES NFR BLD AUTO: 9.7 % — SIGNIFICANT CHANGE UP (ref 2–14)
MONOCYTES NFR BLD AUTO: 9.8 % — SIGNIFICANT CHANGE UP (ref 2–14)
MONOCYTES NFR BLD AUTO: 9.9 % — SIGNIFICANT CHANGE UP (ref 2–14)
MONOS+MACROS # FLD: 10 % — SIGNIFICANT CHANGE UP
MPO AB + PR3 PNL SER: SIGNIFICANT CHANGE UP
MPO AB + PR3 PNL SER: SIGNIFICANT CHANGE UP
MRSA PCR RESULT.: SIGNIFICANT CHANGE UP
MSSA DNA SPEC QL NAA+PROBE: SIGNIFICANT CHANGE UP
MYELOCYTES NFR BLD: 0.9 % — HIGH (ref 0–0)
MYELOCYTES NFR BLD: 0.9 % — HIGH (ref 0–0)
MYELOCYTES NFR BLD: 1.7 % — HIGH (ref 0–0)
MYELOCYTES NFR BLD: 3.6 % — HIGH (ref 0–0)
NEUTROPHILS # BLD AUTO: 10.05 K/UL — HIGH (ref 1.8–7.4)
NEUTROPHILS # BLD AUTO: 10.05 K/UL — HIGH (ref 1.8–7.4)
NEUTROPHILS # BLD AUTO: 10.07 K/UL — HIGH (ref 1.8–7.4)
NEUTROPHILS # BLD AUTO: 10.08 K/UL — HIGH (ref 1.8–7.4)
NEUTROPHILS # BLD AUTO: 10.08 K/UL — HIGH (ref 1.8–7.4)
NEUTROPHILS # BLD AUTO: 10.15 K/UL — HIGH (ref 1.8–7.4)
NEUTROPHILS # BLD AUTO: 10.2 K/UL — HIGH (ref 1.8–7.4)
NEUTROPHILS # BLD AUTO: 10.2 K/UL — HIGH (ref 1.8–7.4)
NEUTROPHILS # BLD AUTO: 10.25 K/UL — HIGH (ref 1.8–7.4)
NEUTROPHILS # BLD AUTO: 10.44 K/UL — HIGH (ref 1.8–7.4)
NEUTROPHILS # BLD AUTO: 10.44 K/UL — HIGH (ref 1.8–7.4)
NEUTROPHILS # BLD AUTO: 10.5 K/UL — HIGH (ref 1.8–7.4)
NEUTROPHILS # BLD AUTO: 10.5 K/UL — HIGH (ref 1.8–7.4)
NEUTROPHILS # BLD AUTO: 10.51 K/UL — HIGH (ref 1.8–7.4)
NEUTROPHILS # BLD AUTO: 10.51 K/UL — HIGH (ref 1.8–7.4)
NEUTROPHILS # BLD AUTO: 10.58 K/UL — HIGH (ref 1.8–7.4)
NEUTROPHILS # BLD AUTO: 10.58 K/UL — HIGH (ref 1.8–7.4)
NEUTROPHILS # BLD AUTO: 10.63 K/UL — HIGH (ref 1.8–7.4)
NEUTROPHILS # BLD AUTO: 10.63 K/UL — HIGH (ref 1.8–7.4)
NEUTROPHILS # BLD AUTO: 10.68 K/UL — HIGH (ref 1.8–7.4)
NEUTROPHILS # BLD AUTO: 10.78 K/UL — HIGH (ref 1.8–7.4)
NEUTROPHILS # BLD AUTO: 10.92 K/UL — HIGH (ref 1.8–7.4)
NEUTROPHILS # BLD AUTO: 10.92 K/UL — HIGH (ref 1.8–7.4)
NEUTROPHILS # BLD AUTO: 10.94 K/UL — HIGH (ref 1.8–7.4)
NEUTROPHILS # BLD AUTO: 10.94 K/UL — HIGH (ref 1.8–7.4)
NEUTROPHILS # BLD AUTO: 11.06 K/UL — HIGH (ref 1.8–7.4)
NEUTROPHILS # BLD AUTO: 11.15 K/UL — HIGH (ref 1.8–7.4)
NEUTROPHILS # BLD AUTO: 11.16 K/UL — HIGH (ref 1.8–7.4)
NEUTROPHILS # BLD AUTO: 11.17 K/UL — HIGH (ref 1.8–7.4)
NEUTROPHILS # BLD AUTO: 11.17 K/UL — HIGH (ref 1.8–7.4)
NEUTROPHILS # BLD AUTO: 11.18 K/UL — HIGH (ref 1.8–7.4)
NEUTROPHILS # BLD AUTO: 11.2 K/UL — HIGH (ref 1.8–7.4)
NEUTROPHILS # BLD AUTO: 11.21 K/UL — HIGH (ref 1.8–7.4)
NEUTROPHILS # BLD AUTO: 11.42 K/UL — HIGH (ref 1.8–7.4)
NEUTROPHILS # BLD AUTO: 11.42 K/UL — HIGH (ref 1.8–7.4)
NEUTROPHILS # BLD AUTO: 11.44 K/UL — HIGH (ref 1.8–7.4)
NEUTROPHILS # BLD AUTO: 11.5 K/UL — HIGH (ref 1.8–7.4)
NEUTROPHILS # BLD AUTO: 11.5 K/UL — HIGH (ref 1.8–7.4)
NEUTROPHILS # BLD AUTO: 11.51 K/UL — HIGH (ref 1.8–7.4)
NEUTROPHILS # BLD AUTO: 11.59 K/UL — HIGH (ref 1.8–7.4)
NEUTROPHILS # BLD AUTO: 11.59 K/UL — HIGH (ref 1.8–7.4)
NEUTROPHILS # BLD AUTO: 11.8 K/UL — HIGH (ref 1.8–7.4)
NEUTROPHILS # BLD AUTO: 11.8 K/UL — HIGH (ref 1.8–7.4)
NEUTROPHILS # BLD AUTO: 11.86 K/UL — HIGH (ref 1.8–7.4)
NEUTROPHILS # BLD AUTO: 11.86 K/UL — HIGH (ref 1.8–7.4)
NEUTROPHILS # BLD AUTO: 11.91 K/UL — HIGH (ref 1.8–7.4)
NEUTROPHILS # BLD AUTO: 11.91 K/UL — HIGH (ref 1.8–7.4)
NEUTROPHILS # BLD AUTO: 12 K/UL — HIGH (ref 1.8–7.4)
NEUTROPHILS # BLD AUTO: 12.11 K/UL — HIGH (ref 1.8–7.4)
NEUTROPHILS # BLD AUTO: 12.2 K/UL — HIGH (ref 1.8–7.4)
NEUTROPHILS # BLD AUTO: 12.2 K/UL — HIGH (ref 1.8–7.4)
NEUTROPHILS # BLD AUTO: 12.49 K/UL — HIGH (ref 1.8–7.4)
NEUTROPHILS # BLD AUTO: 12.49 K/UL — HIGH (ref 1.8–7.4)
NEUTROPHILS # BLD AUTO: 12.57 K/UL — HIGH (ref 1.8–7.4)
NEUTROPHILS # BLD AUTO: 12.67 K/UL — HIGH (ref 1.8–7.4)
NEUTROPHILS # BLD AUTO: 12.76 K/UL — HIGH (ref 1.8–7.4)
NEUTROPHILS # BLD AUTO: 13.18 K/UL — HIGH (ref 1.8–7.4)
NEUTROPHILS # BLD AUTO: 13.37 K/UL — HIGH (ref 1.8–7.4)
NEUTROPHILS # BLD AUTO: 13.37 K/UL — HIGH (ref 1.8–7.4)
NEUTROPHILS # BLD AUTO: 13.41 K/UL — HIGH (ref 1.8–7.4)
NEUTROPHILS # BLD AUTO: 13.86 K/UL — HIGH (ref 1.8–7.4)
NEUTROPHILS # BLD AUTO: 13.93 K/UL — HIGH (ref 1.8–7.4)
NEUTROPHILS # BLD AUTO: 14.09 K/UL — HIGH (ref 1.8–7.4)
NEUTROPHILS # BLD AUTO: 14.09 K/UL — HIGH (ref 1.8–7.4)
NEUTROPHILS # BLD AUTO: 14.1 K/UL — HIGH (ref 1.8–7.4)
NEUTROPHILS # BLD AUTO: 14.1 K/UL — HIGH (ref 1.8–7.4)
NEUTROPHILS # BLD AUTO: 14.61 K/UL — HIGH (ref 1.8–7.4)
NEUTROPHILS # BLD AUTO: 14.7 K/UL — HIGH (ref 1.8–7.4)
NEUTROPHILS # BLD AUTO: 14.7 K/UL — HIGH (ref 1.8–7.4)
NEUTROPHILS # BLD AUTO: 15.24 K/UL — HIGH (ref 1.8–7.4)
NEUTROPHILS # BLD AUTO: 15.24 K/UL — HIGH (ref 1.8–7.4)
NEUTROPHILS # BLD AUTO: 15.35 K/UL — HIGH (ref 1.8–7.4)
NEUTROPHILS # BLD AUTO: 15.35 K/UL — HIGH (ref 1.8–7.4)
NEUTROPHILS # BLD AUTO: 15.77 K/UL — HIGH (ref 1.8–7.4)
NEUTROPHILS # BLD AUTO: 15.77 K/UL — HIGH (ref 1.8–7.4)
NEUTROPHILS # BLD AUTO: 15.78 K/UL — HIGH (ref 1.8–7.4)
NEUTROPHILS # BLD AUTO: 16.14 K/UL — HIGH (ref 1.8–7.4)
NEUTROPHILS # BLD AUTO: 16.14 K/UL — HIGH (ref 1.8–7.4)
NEUTROPHILS # BLD AUTO: 16.31 K/UL — HIGH (ref 1.8–7.4)
NEUTROPHILS # BLD AUTO: 16.31 K/UL — HIGH (ref 1.8–7.4)
NEUTROPHILS # BLD AUTO: 17.25 K/UL — HIGH (ref 1.8–7.4)
NEUTROPHILS # BLD AUTO: 18.27 K/UL — HIGH (ref 1.8–7.4)
NEUTROPHILS # BLD AUTO: 18.27 K/UL — HIGH (ref 1.8–7.4)
NEUTROPHILS # BLD AUTO: 2.82 K/UL — SIGNIFICANT CHANGE UP (ref 1.8–7.4)
NEUTROPHILS # BLD AUTO: 20.83 K/UL — HIGH (ref 1.8–7.4)
NEUTROPHILS # BLD AUTO: 20.83 K/UL — HIGH (ref 1.8–7.4)
NEUTROPHILS # BLD AUTO: 22.89 K/UL — HIGH (ref 1.8–7.4)
NEUTROPHILS # BLD AUTO: 22.89 K/UL — HIGH (ref 1.8–7.4)
NEUTROPHILS # BLD AUTO: 26.34 K/UL — HIGH (ref 1.8–7.4)
NEUTROPHILS # BLD AUTO: 26.34 K/UL — HIGH (ref 1.8–7.4)
NEUTROPHILS # BLD AUTO: 27.45 K/UL — HIGH (ref 1.8–7.4)
NEUTROPHILS # BLD AUTO: 27.45 K/UL — HIGH (ref 1.8–7.4)
NEUTROPHILS # BLD AUTO: 29.15 K/UL — HIGH (ref 1.8–7.4)
NEUTROPHILS # BLD AUTO: 29.15 K/UL — HIGH (ref 1.8–7.4)
NEUTROPHILS # BLD AUTO: 3.28 K/UL — SIGNIFICANT CHANGE UP (ref 1.8–7.4)
NEUTROPHILS # BLD AUTO: 3.42 K/UL — SIGNIFICANT CHANGE UP (ref 1.8–7.4)
NEUTROPHILS # BLD AUTO: 3.49 K/UL — SIGNIFICANT CHANGE UP (ref 1.8–7.4)
NEUTROPHILS # BLD AUTO: 3.77 K/UL — SIGNIFICANT CHANGE UP (ref 1.8–7.4)
NEUTROPHILS # BLD AUTO: 30.19 K/UL — HIGH (ref 1.8–7.4)
NEUTROPHILS # BLD AUTO: 30.19 K/UL — HIGH (ref 1.8–7.4)
NEUTROPHILS # BLD AUTO: 38.38 K/UL — HIGH (ref 1.8–7.4)
NEUTROPHILS # BLD AUTO: 38.38 K/UL — HIGH (ref 1.8–7.4)
NEUTROPHILS # BLD AUTO: 4.3 K/UL — SIGNIFICANT CHANGE UP (ref 1.8–7.4)
NEUTROPHILS # BLD AUTO: 4.93 K/UL — SIGNIFICANT CHANGE UP (ref 1.8–7.4)
NEUTROPHILS # BLD AUTO: 5.07 K/UL — SIGNIFICANT CHANGE UP (ref 1.8–7.4)
NEUTROPHILS # BLD AUTO: 5.39 K/UL — SIGNIFICANT CHANGE UP (ref 1.8–7.4)
NEUTROPHILS # BLD AUTO: 5.47 K/UL — SIGNIFICANT CHANGE UP (ref 1.8–7.4)
NEUTROPHILS # BLD AUTO: 5.59 K/UL — SIGNIFICANT CHANGE UP (ref 1.8–7.4)
NEUTROPHILS # BLD AUTO: 5.61 K/UL — SIGNIFICANT CHANGE UP (ref 1.8–7.4)
NEUTROPHILS # BLD AUTO: 5.63 K/UL — SIGNIFICANT CHANGE UP (ref 1.8–7.4)
NEUTROPHILS # BLD AUTO: 6.08 K/UL — SIGNIFICANT CHANGE UP (ref 1.8–7.4)
NEUTROPHILS # BLD AUTO: 6.31 K/UL — SIGNIFICANT CHANGE UP (ref 1.8–7.4)
NEUTROPHILS # BLD AUTO: 7.12 K/UL — SIGNIFICANT CHANGE UP (ref 1.8–7.4)
NEUTROPHILS # BLD AUTO: 7.37 K/UL — SIGNIFICANT CHANGE UP (ref 1.8–7.4)
NEUTROPHILS # BLD AUTO: 7.69 K/UL — HIGH (ref 1.8–7.4)
NEUTROPHILS # BLD AUTO: 7.73 K/UL — HIGH (ref 1.8–7.4)
NEUTROPHILS # BLD AUTO: 7.92 K/UL — HIGH (ref 1.8–7.4)
NEUTROPHILS # BLD AUTO: 8.04 K/UL — HIGH (ref 1.8–7.4)
NEUTROPHILS # BLD AUTO: 8.14 K/UL — HIGH (ref 1.8–7.4)
NEUTROPHILS # BLD AUTO: 8.57 K/UL — HIGH (ref 1.8–7.4)
NEUTROPHILS # BLD AUTO: 8.59 K/UL — HIGH (ref 1.8–7.4)
NEUTROPHILS # BLD AUTO: 8.74 K/UL — HIGH (ref 1.8–7.4)
NEUTROPHILS # BLD AUTO: 8.74 K/UL — HIGH (ref 1.8–7.4)
NEUTROPHILS # BLD AUTO: 8.81 K/UL — HIGH (ref 1.8–7.4)
NEUTROPHILS # BLD AUTO: 8.84 K/UL — HIGH (ref 1.8–7.4)
NEUTROPHILS # BLD AUTO: 8.84 K/UL — HIGH (ref 1.8–7.4)
NEUTROPHILS # BLD AUTO: 8.87 K/UL — HIGH (ref 1.8–7.4)
NEUTROPHILS # BLD AUTO: 8.9 K/UL — HIGH (ref 1.8–7.4)
NEUTROPHILS # BLD AUTO: 9.01 K/UL — HIGH (ref 1.8–7.4)
NEUTROPHILS # BLD AUTO: 9.07 K/UL — HIGH (ref 1.8–7.4)
NEUTROPHILS # BLD AUTO: 9.2 K/UL — HIGH (ref 1.8–7.4)
NEUTROPHILS # BLD AUTO: 9.2 K/UL — HIGH (ref 1.8–7.4)
NEUTROPHILS # BLD AUTO: 9.3 K/UL — HIGH (ref 1.8–7.4)
NEUTROPHILS # BLD AUTO: 9.3 K/UL — HIGH (ref 1.8–7.4)
NEUTROPHILS # BLD AUTO: 9.33 K/UL — HIGH (ref 1.8–7.4)
NEUTROPHILS # BLD AUTO: 9.35 K/UL — HIGH (ref 1.8–7.4)
NEUTROPHILS # BLD AUTO: 9.38 K/UL — HIGH (ref 1.8–7.4)
NEUTROPHILS # BLD AUTO: 9.44 K/UL — HIGH (ref 1.8–7.4)
NEUTROPHILS # BLD AUTO: 9.44 K/UL — HIGH (ref 1.8–7.4)
NEUTROPHILS # BLD AUTO: 9.47 K/UL — HIGH (ref 1.8–7.4)
NEUTROPHILS # BLD AUTO: 9.68 K/UL — HIGH (ref 1.8–7.4)
NEUTROPHILS # BLD AUTO: 9.68 K/UL — HIGH (ref 1.8–7.4)
NEUTROPHILS # BLD AUTO: 9.75 K/UL — HIGH (ref 1.8–7.4)
NEUTROPHILS # BLD AUTO: 9.75 K/UL — HIGH (ref 1.8–7.4)
NEUTROPHILS # BLD AUTO: 9.78 K/UL — HIGH (ref 1.8–7.4)
NEUTROPHILS # BLD AUTO: 9.78 K/UL — HIGH (ref 1.8–7.4)
NEUTROPHILS # BLD AUTO: 9.84 K/UL — HIGH (ref 1.8–7.4)
NEUTROPHILS # BLD AUTO: 9.84 K/UL — HIGH (ref 1.8–7.4)
NEUTROPHILS # BLD AUTO: 9.85 K/UL — HIGH (ref 1.8–7.4)
NEUTROPHILS # BLD AUTO: 9.95 K/UL — HIGH (ref 1.8–7.4)
NEUTROPHILS NFR BLD AUTO: 56.5 % — SIGNIFICANT CHANGE UP (ref 43–77)
NEUTROPHILS NFR BLD AUTO: 60 % — SIGNIFICANT CHANGE UP (ref 43–77)
NEUTROPHILS NFR BLD AUTO: 62.2 % — SIGNIFICANT CHANGE UP (ref 43–77)
NEUTROPHILS NFR BLD AUTO: 67 % — SIGNIFICANT CHANGE UP (ref 43–77)
NEUTROPHILS NFR BLD AUTO: 68.5 % — SIGNIFICANT CHANGE UP (ref 43–77)
NEUTROPHILS NFR BLD AUTO: 69 % — SIGNIFICANT CHANGE UP (ref 43–77)
NEUTROPHILS NFR BLD AUTO: 69.6 % — SIGNIFICANT CHANGE UP (ref 43–77)
NEUTROPHILS NFR BLD AUTO: 69.7 % — SIGNIFICANT CHANGE UP (ref 43–77)
NEUTROPHILS NFR BLD AUTO: 69.7 % — SIGNIFICANT CHANGE UP (ref 43–77)
NEUTROPHILS NFR BLD AUTO: 70 % — SIGNIFICANT CHANGE UP (ref 43–77)
NEUTROPHILS NFR BLD AUTO: 70.3 % — SIGNIFICANT CHANGE UP (ref 43–77)
NEUTROPHILS NFR BLD AUTO: 70.3 % — SIGNIFICANT CHANGE UP (ref 43–77)
NEUTROPHILS NFR BLD AUTO: 70.4 % — SIGNIFICANT CHANGE UP (ref 43–77)
NEUTROPHILS NFR BLD AUTO: 70.9 % — SIGNIFICANT CHANGE UP (ref 43–77)
NEUTROPHILS NFR BLD AUTO: 71.5 % — SIGNIFICANT CHANGE UP (ref 43–77)
NEUTROPHILS NFR BLD AUTO: 71.5 % — SIGNIFICANT CHANGE UP (ref 43–77)
NEUTROPHILS NFR BLD AUTO: 71.6 % — SIGNIFICANT CHANGE UP (ref 43–77)
NEUTROPHILS NFR BLD AUTO: 71.7 % — SIGNIFICANT CHANGE UP (ref 43–77)
NEUTROPHILS NFR BLD AUTO: 71.7 % — SIGNIFICANT CHANGE UP (ref 43–77)
NEUTROPHILS NFR BLD AUTO: 72.1 % — SIGNIFICANT CHANGE UP (ref 43–77)
NEUTROPHILS NFR BLD AUTO: 72.1 % — SIGNIFICANT CHANGE UP (ref 43–77)
NEUTROPHILS NFR BLD AUTO: 72.3 % — SIGNIFICANT CHANGE UP (ref 43–77)
NEUTROPHILS NFR BLD AUTO: 72.3 % — SIGNIFICANT CHANGE UP (ref 43–77)
NEUTROPHILS NFR BLD AUTO: 72.4 % — SIGNIFICANT CHANGE UP (ref 43–77)
NEUTROPHILS NFR BLD AUTO: 72.4 % — SIGNIFICANT CHANGE UP (ref 43–77)
NEUTROPHILS NFR BLD AUTO: 72.5 % — SIGNIFICANT CHANGE UP (ref 43–77)
NEUTROPHILS NFR BLD AUTO: 72.5 % — SIGNIFICANT CHANGE UP (ref 43–77)
NEUTROPHILS NFR BLD AUTO: 73.4 % — SIGNIFICANT CHANGE UP (ref 43–77)
NEUTROPHILS NFR BLD AUTO: 73.5 % — SIGNIFICANT CHANGE UP (ref 43–77)
NEUTROPHILS NFR BLD AUTO: 73.5 % — SIGNIFICANT CHANGE UP (ref 43–77)
NEUTROPHILS NFR BLD AUTO: 73.7 % — SIGNIFICANT CHANGE UP (ref 43–77)
NEUTROPHILS NFR BLD AUTO: 74 % — SIGNIFICANT CHANGE UP (ref 43–77)
NEUTROPHILS NFR BLD AUTO: 74 % — SIGNIFICANT CHANGE UP (ref 43–77)
NEUTROPHILS NFR BLD AUTO: 74.2 % — SIGNIFICANT CHANGE UP (ref 43–77)
NEUTROPHILS NFR BLD AUTO: 74.2 % — SIGNIFICANT CHANGE UP (ref 43–77)
NEUTROPHILS NFR BLD AUTO: 74.7 % — SIGNIFICANT CHANGE UP (ref 43–77)
NEUTROPHILS NFR BLD AUTO: 74.7 % — SIGNIFICANT CHANGE UP (ref 43–77)
NEUTROPHILS NFR BLD AUTO: 74.8 % — SIGNIFICANT CHANGE UP (ref 43–77)
NEUTROPHILS NFR BLD AUTO: 75.1 % — SIGNIFICANT CHANGE UP (ref 43–77)
NEUTROPHILS NFR BLD AUTO: 75.1 % — SIGNIFICANT CHANGE UP (ref 43–77)
NEUTROPHILS NFR BLD AUTO: 75.2 % — SIGNIFICANT CHANGE UP (ref 43–77)
NEUTROPHILS NFR BLD AUTO: 75.4 % — SIGNIFICANT CHANGE UP (ref 43–77)
NEUTROPHILS NFR BLD AUTO: 75.4 % — SIGNIFICANT CHANGE UP (ref 43–77)
NEUTROPHILS NFR BLD AUTO: 75.5 % — SIGNIFICANT CHANGE UP (ref 43–77)
NEUTROPHILS NFR BLD AUTO: 75.5 % — SIGNIFICANT CHANGE UP (ref 43–77)
NEUTROPHILS NFR BLD AUTO: 75.6 % — SIGNIFICANT CHANGE UP (ref 43–77)
NEUTROPHILS NFR BLD AUTO: 75.7 % — SIGNIFICANT CHANGE UP (ref 43–77)
NEUTROPHILS NFR BLD AUTO: 75.7 % — SIGNIFICANT CHANGE UP (ref 43–77)
NEUTROPHILS NFR BLD AUTO: 76.1 % — SIGNIFICANT CHANGE UP (ref 43–77)
NEUTROPHILS NFR BLD AUTO: 76.2 % — SIGNIFICANT CHANGE UP (ref 43–77)
NEUTROPHILS NFR BLD AUTO: 76.2 % — SIGNIFICANT CHANGE UP (ref 43–77)
NEUTROPHILS NFR BLD AUTO: 76.3 % — SIGNIFICANT CHANGE UP (ref 43–77)
NEUTROPHILS NFR BLD AUTO: 76.6 % — SIGNIFICANT CHANGE UP (ref 43–77)
NEUTROPHILS NFR BLD AUTO: 76.8 % — SIGNIFICANT CHANGE UP (ref 43–77)
NEUTROPHILS NFR BLD AUTO: 76.8 % — SIGNIFICANT CHANGE UP (ref 43–77)
NEUTROPHILS NFR BLD AUTO: 76.9 % — SIGNIFICANT CHANGE UP (ref 43–77)
NEUTROPHILS NFR BLD AUTO: 76.9 % — SIGNIFICANT CHANGE UP (ref 43–77)
NEUTROPHILS NFR BLD AUTO: 77 % — SIGNIFICANT CHANGE UP (ref 43–77)
NEUTROPHILS NFR BLD AUTO: 77.4 % — HIGH (ref 43–77)
NEUTROPHILS NFR BLD AUTO: 77.5 % — HIGH (ref 43–77)
NEUTROPHILS NFR BLD AUTO: 77.5 % — HIGH (ref 43–77)
NEUTROPHILS NFR BLD AUTO: 77.7 % — HIGH (ref 43–77)
NEUTROPHILS NFR BLD AUTO: 78.1 % — HIGH (ref 43–77)
NEUTROPHILS NFR BLD AUTO: 78.2 % — HIGH (ref 43–77)
NEUTROPHILS NFR BLD AUTO: 78.2 % — HIGH (ref 43–77)
NEUTROPHILS NFR BLD AUTO: 78.6 % — HIGH (ref 43–77)
NEUTROPHILS NFR BLD AUTO: 78.8 % — HIGH (ref 43–77)
NEUTROPHILS NFR BLD AUTO: 78.8 % — HIGH (ref 43–77)
NEUTROPHILS NFR BLD AUTO: 79.1 % — HIGH (ref 43–77)
NEUTROPHILS NFR BLD AUTO: 79.4 % — HIGH (ref 43–77)
NEUTROPHILS NFR BLD AUTO: 79.5 % — HIGH (ref 43–77)
NEUTROPHILS NFR BLD AUTO: 79.5 % — HIGH (ref 43–77)
NEUTROPHILS NFR BLD AUTO: 79.6 % — HIGH (ref 43–77)
NEUTROPHILS NFR BLD AUTO: 79.9 % — HIGH (ref 43–77)
NEUTROPHILS NFR BLD AUTO: 80 % — HIGH (ref 43–77)
NEUTROPHILS NFR BLD AUTO: 80.1 % — HIGH (ref 43–77)
NEUTROPHILS NFR BLD AUTO: 80.3 % — HIGH (ref 43–77)
NEUTROPHILS NFR BLD AUTO: 80.5 % — HIGH (ref 43–77)
NEUTROPHILS NFR BLD AUTO: 80.5 % — HIGH (ref 43–77)
NEUTROPHILS NFR BLD AUTO: 80.7 % — HIGH (ref 43–77)
NEUTROPHILS NFR BLD AUTO: 80.7 % — HIGH (ref 43–77)
NEUTROPHILS NFR BLD AUTO: 81.2 % — HIGH (ref 43–77)
NEUTROPHILS NFR BLD AUTO: 81.2 % — HIGH (ref 43–77)
NEUTROPHILS NFR BLD AUTO: 81.3 % — HIGH (ref 43–77)
NEUTROPHILS NFR BLD AUTO: 81.4 % — HIGH (ref 43–77)
NEUTROPHILS NFR BLD AUTO: 81.6 % — HIGH (ref 43–77)
NEUTROPHILS NFR BLD AUTO: 81.6 % — HIGH (ref 43–77)
NEUTROPHILS NFR BLD AUTO: 82.3 % — HIGH (ref 43–77)
NEUTROPHILS NFR BLD AUTO: 82.3 % — HIGH (ref 43–77)
NEUTROPHILS NFR BLD AUTO: 83 % — HIGH (ref 43–77)
NEUTROPHILS NFR BLD AUTO: 83.3 % — HIGH (ref 43–77)
NEUTROPHILS NFR BLD AUTO: 83.3 % — HIGH (ref 43–77)
NEUTROPHILS NFR BLD AUTO: 83.8 % — HIGH (ref 43–77)
NEUTROPHILS NFR BLD AUTO: 83.8 % — HIGH (ref 43–77)
NEUTROPHILS NFR BLD AUTO: 84.1 % — HIGH (ref 43–77)
NEUTROPHILS NFR BLD AUTO: 84.1 % — HIGH (ref 43–77)
NEUTROPHILS NFR BLD AUTO: 84.3 % — HIGH (ref 43–77)
NEUTROPHILS NFR BLD AUTO: 84.3 % — HIGH (ref 43–77)
NEUTROPHILS NFR BLD AUTO: 84.7 % — HIGH (ref 43–77)
NEUTROPHILS NFR BLD AUTO: 84.7 % — HIGH (ref 43–77)
NEUTROPHILS NFR BLD AUTO: 85 % — HIGH (ref 43–77)
NEUTROPHILS NFR BLD AUTO: 85.2 % — HIGH (ref 43–77)
NEUTROPHILS NFR BLD AUTO: 85.2 % — HIGH (ref 43–77)
NEUTROPHILS NFR BLD AUTO: 85.5 % — HIGH (ref 43–77)
NEUTROPHILS NFR BLD AUTO: 85.8 % — HIGH (ref 43–77)
NEUTROPHILS NFR BLD AUTO: 86 % — HIGH (ref 43–77)
NEUTROPHILS NFR BLD AUTO: 86.1 % — HIGH (ref 43–77)
NEUTROPHILS NFR BLD AUTO: 86.2 % — HIGH (ref 43–77)
NEUTROPHILS NFR BLD AUTO: 86.6 % — HIGH (ref 43–77)
NEUTROPHILS NFR BLD AUTO: 86.6 % — HIGH (ref 43–77)
NEUTROPHILS NFR BLD AUTO: 86.9 % — HIGH (ref 43–77)
NEUTROPHILS NFR BLD AUTO: 87 % — HIGH (ref 43–77)
NEUTROPHILS NFR BLD AUTO: 87.6 % — HIGH (ref 43–77)
NEUTROPHILS NFR BLD AUTO: 88.1 % — HIGH (ref 43–77)
NEUTROPHILS NFR BLD AUTO: 88.2 % — HIGH (ref 43–77)
NEUTROPHILS NFR BLD AUTO: 88.2 % — HIGH (ref 43–77)
NEUTROPHILS NFR BLD AUTO: 88.7 % — HIGH (ref 43–77)
NEUTROPHILS NFR BLD AUTO: 89.2 % — HIGH (ref 43–77)
NEUTROPHILS NFR BLD AUTO: 89.2 % — HIGH (ref 43–77)
NEUTROPHILS NFR BLD AUTO: 89.3 % — HIGH (ref 43–77)
NEUTROPHILS NFR BLD AUTO: 89.6 % — HIGH (ref 43–77)
NEUTROPHILS NFR BLD AUTO: 89.6 % — HIGH (ref 43–77)
NEUTROPHILS NFR BLD AUTO: 89.7 % — HIGH (ref 43–77)
NEUTROPHILS NFR BLD AUTO: 90.2 % — HIGH (ref 43–77)
NEUTROPHILS NFR BLD AUTO: 90.2 % — HIGH (ref 43–77)
NEUTROPHILS NFR BLD AUTO: 90.3 % — HIGH (ref 43–77)
NEUTROPHILS NFR BLD AUTO: 90.3 % — HIGH (ref 43–77)
NEUTROPHILS NFR BLD AUTO: 90.4 % — HIGH (ref 43–77)
NEUTROPHILS NFR BLD AUTO: 90.9 % — HIGH (ref 43–77)
NEUTROPHILS NFR BLD AUTO: 91.4 % — HIGH (ref 43–77)
NEUTROPHILS NFR BLD AUTO: 92.4 % — HIGH (ref 43–77)
NEUTROPHILS NFR BLD AUTO: 92.4 % — HIGH (ref 43–77)
NEUTROPHILS NFR BLD AUTO: 92.6 % — HIGH (ref 43–77)
NEUTROPHILS NFR BLD AUTO: 93 % — HIGH (ref 43–77)
NEUTROPHILS-BODY FLUID: 80 % — SIGNIFICANT CHANGE UP
NEUTROPHILS-BODY FLUID: 80 % — SIGNIFICANT CHANGE UP
NEUTROPHILS-BODY FLUID: 82 % — SIGNIFICANT CHANGE UP
NEUTS BAND # BLD: 0.9 % — SIGNIFICANT CHANGE UP (ref 0–6)
NEUTS BAND # BLD: 1 % — SIGNIFICANT CHANGE UP (ref 0–6)
NEUTS BAND # BLD: 1.7 % — SIGNIFICANT CHANGE UP (ref 0–6)
NEUTS BAND # BLD: 1.8 % — SIGNIFICANT CHANGE UP (ref 0–6)
NEUTS BAND # BLD: 20 % — CRITICAL HIGH (ref 0–6)
NEUTS BAND # BLD: 20 % — CRITICAL HIGH (ref 0–6)
NEUTS BAND # BLD: 4.3 % — SIGNIFICANT CHANGE UP (ref 0–6)
NEUTS BAND # BLD: 4.5 % — SIGNIFICANT CHANGE UP (ref 0–6)
NEUTS BAND # BLD: 6.2 % — HIGH (ref 0–6)
NEUTS BAND # BLD: 6.9 % — HIGH (ref 0–6)
NEUTS BAND # BLD: 7 % — HIGH (ref 0–6)
NIGHT BLUE STAIN TISS: SIGNIFICANT CHANGE UP
NITRITE UR-MCNC: NEGATIVE — SIGNIFICANT CHANGE UP
NON HDL CHOLESTEROL: 33 MG/DL — SIGNIFICANT CHANGE UP
NRBC # BLD: 0 /100 WBCS — SIGNIFICANT CHANGE UP (ref 0–0)
NRBC # BLD: 0 /100 — SIGNIFICANT CHANGE UP (ref 0–0)
NRBC # BLD: 1 /100 — HIGH (ref 0–0)
NRBC # FLD: 0 K/UL — SIGNIFICANT CHANGE UP (ref 0–0)
NRBC # FLD: 0.02 K/UL — HIGH (ref 0–0)
NRBC # FLD: 0.03 K/UL — HIGH (ref 0–0)
NRBC # FLD: 0.04 K/UL — HIGH (ref 0–0)
NRBC # FLD: 0.05 K/UL — HIGH (ref 0–0)
NRBC # FLD: 0.05 K/UL — HIGH (ref 0–0)
NT-PROBNP SERPL-SCNC: 8075 PG/ML — HIGH (ref 0–300)
NT-PROBNP SERPL-SCNC: HIGH PG/ML
ORGANISM # SPEC MICROSCOPIC CNT: ABNORMAL
ORGANISM # SPEC MICROSCOPIC CNT: SIGNIFICANT CHANGE UP
OSMOLALITY SERPL: 295 MOSM/KG — SIGNIFICANT CHANGE UP (ref 275–295)
OSMOLALITY UR: 277 MOSM/KG — SIGNIFICANT CHANGE UP (ref 50–1200)
OSMOLALITY UR: 494 MOS/KG — SIGNIFICANT CHANGE UP (ref 300–900)
OTHER CELLS FLD MANUAL: 0 % — SIGNIFICANT CHANGE UP
OTHER CELLS FLD MANUAL: 0 % — SIGNIFICANT CHANGE UP
OVALOCYTES BLD QL SMEAR: SLIGHT — SIGNIFICANT CHANGE UP
P-ANCA SER-ACNC: NEGATIVE — SIGNIFICANT CHANGE UP
P-ANCA SER-ACNC: NEGATIVE — SIGNIFICANT CHANGE UP
PCO2 BLDA: 108 MMHG — CRITICAL HIGH (ref 32–45)
PCO2 BLDA: 108 MMHG — CRITICAL HIGH (ref 32–45)
PCO2 BLDA: 36 MMHG — SIGNIFICANT CHANGE UP (ref 32–45)
PCO2 BLDA: 50 MMHG — HIGH (ref 32–45)
PCO2 BLDA: 52 MMHG — HIGH (ref 32–45)
PCO2 BLDA: 53 MMHG — HIGH (ref 32–45)
PCO2 BLDA: 61 MMHG — HIGH (ref 32–45)
PCO2 BLDA: 61 MMHG — HIGH (ref 32–45)
PCO2 BLDA: 63 MMHG — HIGH (ref 32–45)
PCO2 BLDA: 83 MMHG — CRITICAL HIGH (ref 32–45)
PCO2 BLDA: 83 MMHG — CRITICAL HIGH (ref 32–45)
PCO2 BLDV: 111 MMHG — HIGH (ref 39–52)
PCO2 BLDV: 35 MMHG — LOW (ref 39–52)
PCO2 BLDV: 44 MMHG — SIGNIFICANT CHANGE UP (ref 39–52)
PCO2 BLDV: 44 MMHG — SIGNIFICANT CHANGE UP (ref 39–52)
PCO2 BLDV: 45 MMHG — SIGNIFICANT CHANGE UP (ref 39–52)
PCO2 BLDV: 46 MMHG — SIGNIFICANT CHANGE UP (ref 39–52)
PCO2 BLDV: 46 MMHG — SIGNIFICANT CHANGE UP (ref 39–52)
PCO2 BLDV: 47 MMHG — SIGNIFICANT CHANGE UP (ref 39–52)
PCO2 BLDV: 47 MMHG — SIGNIFICANT CHANGE UP (ref 39–52)
PCO2 BLDV: 49 MMHG — SIGNIFICANT CHANGE UP (ref 39–52)
PCO2 BLDV: 50 MMHG — SIGNIFICANT CHANGE UP (ref 39–52)
PCO2 BLDV: 53 MMHG — HIGH (ref 39–52)
PCO2 BLDV: 53 MMHG — HIGH (ref 39–52)
PCO2 BLDV: 55 MMHG — HIGH (ref 39–52)
PCO2 BLDV: 55 MMHG — HIGH (ref 39–52)
PCO2 BLDV: 57 MMHG — HIGH (ref 39–42)
PCO2 BLDV: 58 MMHG — HIGH (ref 39–52)
PCO2 BLDV: 59 MMHG — HIGH (ref 39–52)
PCO2 BLDV: 60 MMHG — HIGH (ref 39–52)
PCO2 BLDV: 61 MMHG — HIGH (ref 39–52)
PCO2 BLDV: 62 MMHG — HIGH (ref 39–52)
PCO2 BLDV: 62 MMHG — HIGH (ref 39–52)
PCO2 BLDV: 64 MMHG — HIGH (ref 39–52)
PCO2 BLDV: 64 MMHG — HIGH (ref 39–52)
PCO2 BLDV: 68 MMHG — HIGH (ref 39–52)
PCO2 BLDV: 74 MMHG — HIGH (ref 39–52)
PH BLDA: 6.99 — CRITICAL LOW (ref 7.35–7.45)
PH BLDA: 6.99 — CRITICAL LOW (ref 7.35–7.45)
PH BLDA: 7.14 — CRITICAL LOW (ref 7.35–7.45)
PH BLDA: 7.14 — CRITICAL LOW (ref 7.35–7.45)
PH BLDA: 7.27 — LOW (ref 7.35–7.45)
PH BLDA: 7.28 — LOW (ref 7.35–7.45)
PH BLDA: 7.33 — LOW (ref 7.35–7.45)
PH BLDA: 7.34 — LOW (ref 7.35–7.45)
PH BLDA: 7.38 — SIGNIFICANT CHANGE UP (ref 7.35–7.45)
PH BLDV: 7.13 — LOW (ref 7.32–7.43)
PH BLDV: 7.15 — LOW (ref 7.32–7.43)
PH BLDV: 7.17 — LOW (ref 7.32–7.43)
PH BLDV: 7.18 — LOW (ref 7.32–7.43)
PH BLDV: 7.23 — LOW (ref 7.32–7.43)
PH BLDV: 7.23 — LOW (ref 7.32–7.43)
PH BLDV: 7.24 — LOW (ref 7.32–7.43)
PH BLDV: 7.28 — LOW (ref 7.32–7.43)
PH BLDV: 7.28 — LOW (ref 7.32–7.43)
PH BLDV: 7.3 — LOW (ref 7.32–7.43)
PH BLDV: 7.31 — LOW (ref 7.32–7.43)
PH BLDV: 7.32 — SIGNIFICANT CHANGE UP (ref 7.32–7.43)
PH BLDV: 7.33 — SIGNIFICANT CHANGE UP (ref 7.32–7.43)
PH BLDV: 7.34 — SIGNIFICANT CHANGE UP (ref 7.32–7.43)
PH BLDV: 7.35 — SIGNIFICANT CHANGE UP (ref 7.32–7.43)
PH BLDV: 7.36 — SIGNIFICANT CHANGE UP (ref 7.32–7.43)
PH BLDV: 7.36 — SIGNIFICANT CHANGE UP (ref 7.32–7.43)
PH BLDV: 7.37 — SIGNIFICANT CHANGE UP (ref 7.32–7.43)
PH BLDV: 7.39 — SIGNIFICANT CHANGE UP (ref 7.32–7.43)
PH BLDV: 7.39 — SIGNIFICANT CHANGE UP (ref 7.32–7.43)
PH BLDV: 7.4 — SIGNIFICANT CHANGE UP (ref 7.32–7.43)
PH BLDV: 7.41 — SIGNIFICANT CHANGE UP (ref 7.32–7.43)
PH BLDV: 7.41 — SIGNIFICANT CHANGE UP (ref 7.32–7.43)
PH BLDV: 7.52 — HIGH (ref 7.32–7.43)
PH FLD: 7.7 — SIGNIFICANT CHANGE UP
PH FLD: 7.7 — SIGNIFICANT CHANGE UP
PH UR: 5.5 — SIGNIFICANT CHANGE UP (ref 5–8)
PH UR: 6 — SIGNIFICANT CHANGE UP (ref 5–8)
PH UR: 7.5 — SIGNIFICANT CHANGE UP (ref 5–8)
PHOSPHATE SERPL-MCNC: 1.6 MG/DL — LOW (ref 2.5–4.5)
PHOSPHATE SERPL-MCNC: 1.7 MG/DL — LOW (ref 2.5–4.5)
PHOSPHATE SERPL-MCNC: 1.8 MG/DL — LOW (ref 2.5–4.5)
PHOSPHATE SERPL-MCNC: 1.9 MG/DL — LOW (ref 2.5–4.5)
PHOSPHATE SERPL-MCNC: 2 MG/DL — LOW (ref 2.5–4.5)
PHOSPHATE SERPL-MCNC: 2.2 MG/DL — LOW (ref 2.5–4.5)
PHOSPHATE SERPL-MCNC: 2.3 MG/DL — LOW (ref 2.5–4.5)
PHOSPHATE SERPL-MCNC: 2.3 MG/DL — LOW (ref 2.5–4.5)
PHOSPHATE SERPL-MCNC: 2.4 MG/DL — LOW (ref 2.5–4.5)
PHOSPHATE SERPL-MCNC: 2.5 MG/DL — SIGNIFICANT CHANGE UP (ref 2.5–4.5)
PHOSPHATE SERPL-MCNC: 2.5 MG/DL — SIGNIFICANT CHANGE UP (ref 2.5–4.5)
PHOSPHATE SERPL-MCNC: 2.6 MG/DL — SIGNIFICANT CHANGE UP (ref 2.5–4.5)
PHOSPHATE SERPL-MCNC: 2.6 MG/DL — SIGNIFICANT CHANGE UP (ref 2.5–4.5)
PHOSPHATE SERPL-MCNC: 2.7 MG/DL — SIGNIFICANT CHANGE UP (ref 2.5–4.5)
PHOSPHATE SERPL-MCNC: 2.8 MG/DL — SIGNIFICANT CHANGE UP (ref 2.5–4.5)
PHOSPHATE SERPL-MCNC: 2.8 MG/DL — SIGNIFICANT CHANGE UP (ref 2.5–4.5)
PHOSPHATE SERPL-MCNC: 2.9 MG/DL — SIGNIFICANT CHANGE UP (ref 2.5–4.5)
PHOSPHATE SERPL-MCNC: 3 MG/DL — SIGNIFICANT CHANGE UP (ref 2.5–4.5)
PHOSPHATE SERPL-MCNC: 3.1 MG/DL — SIGNIFICANT CHANGE UP (ref 2.5–4.5)
PHOSPHATE SERPL-MCNC: 3.1 MG/DL — SIGNIFICANT CHANGE UP (ref 2.5–4.5)
PHOSPHATE SERPL-MCNC: 3.2 MG/DL — SIGNIFICANT CHANGE UP (ref 2.5–4.5)
PHOSPHATE SERPL-MCNC: 3.3 MG/DL — SIGNIFICANT CHANGE UP (ref 2.5–4.5)
PHOSPHATE SERPL-MCNC: 3.4 MG/DL — SIGNIFICANT CHANGE UP (ref 2.5–4.5)
PHOSPHATE SERPL-MCNC: 3.5 MG/DL — SIGNIFICANT CHANGE UP (ref 2.5–4.5)
PHOSPHATE SERPL-MCNC: 3.6 MG/DL — SIGNIFICANT CHANGE UP (ref 2.5–4.5)
PHOSPHATE SERPL-MCNC: 3.7 MG/DL — SIGNIFICANT CHANGE UP (ref 2.5–4.5)
PHOSPHATE SERPL-MCNC: 3.8 MG/DL — SIGNIFICANT CHANGE UP (ref 2.5–4.5)
PHOSPHATE SERPL-MCNC: 3.9 MG/DL — SIGNIFICANT CHANGE UP (ref 2.5–4.5)
PHOSPHATE SERPL-MCNC: 4 MG/DL — SIGNIFICANT CHANGE UP (ref 2.5–4.5)
PHOSPHATE SERPL-MCNC: 4.1 MG/DL — SIGNIFICANT CHANGE UP (ref 2.5–4.5)
PHOSPHATE SERPL-MCNC: 4.2 MG/DL — SIGNIFICANT CHANGE UP (ref 2.5–4.5)
PHOSPHATE SERPL-MCNC: 4.2 MG/DL — SIGNIFICANT CHANGE UP (ref 2.5–4.5)
PHOSPHATE SERPL-MCNC: 4.3 MG/DL — SIGNIFICANT CHANGE UP (ref 2.5–4.5)
PHOSPHATE SERPL-MCNC: 4.3 MG/DL — SIGNIFICANT CHANGE UP (ref 2.5–4.5)
PHOSPHATE SERPL-MCNC: 4.4 MG/DL — SIGNIFICANT CHANGE UP (ref 2.5–4.5)
PHOSPHATE SERPL-MCNC: 4.5 MG/DL — SIGNIFICANT CHANGE UP (ref 2.5–4.5)
PHOSPHATE SERPL-MCNC: 4.6 MG/DL — HIGH (ref 2.5–4.5)
PHOSPHATE SERPL-MCNC: 4.7 MG/DL — HIGH (ref 2.5–4.5)
PHOSPHATE SERPL-MCNC: 4.8 MG/DL — HIGH (ref 2.5–4.5)
PHOSPHATE SERPL-MCNC: 4.9 MG/DL — HIGH (ref 2.5–4.5)
PHOSPHATE SERPL-MCNC: 5 MG/DL — HIGH (ref 2.5–4.5)
PHOSPHATE SERPL-MCNC: 5.1 MG/DL — HIGH (ref 2.5–4.5)
PHOSPHATE SERPL-MCNC: 5.2 MG/DL — HIGH (ref 2.5–4.5)
PHOSPHATE SERPL-MCNC: 5.3 MG/DL — HIGH (ref 2.5–4.5)
PHOSPHATE SERPL-MCNC: 5.4 MG/DL — HIGH (ref 2.5–4.5)
PHOSPHATE SERPL-MCNC: 5.4 MG/DL — HIGH (ref 2.5–4.5)
PHOSPHATE SERPL-MCNC: 5.6 MG/DL — HIGH (ref 2.5–4.5)
PHOSPHATE SERPL-MCNC: 5.9 MG/DL — HIGH (ref 2.5–4.5)
PHOSPHATE SERPL-MCNC: 6 MG/DL — HIGH (ref 2.5–4.5)
PHOSPHATE SERPL-MCNC: 6 MG/DL — HIGH (ref 2.5–4.5)
PHOSPHATE SERPL-MCNC: 6.1 MG/DL — HIGH (ref 2.5–4.5)
PHOSPHATE SERPL-MCNC: 6.1 MG/DL — HIGH (ref 2.5–4.5)
PHOSPHATE SERPL-MCNC: 6.2 MG/DL — HIGH (ref 2.5–4.5)
PHOSPHATE SERPL-MCNC: 6.5 MG/DL — HIGH (ref 2.5–4.5)
PHOSPHATE SERPL-MCNC: 6.8 MG/DL — HIGH (ref 2.5–4.5)
PHOSPHATE SERPL-MCNC: 6.8 MG/DL — HIGH (ref 2.5–4.5)
PHOSPHATE SERPL-MCNC: 7.2 MG/DL — HIGH (ref 2.5–4.5)
PHOSPHATE SERPL-MCNC: 7.6 MG/DL — HIGH (ref 2.5–4.5)
PHOSPHATE SERPL-MCNC: 7.7 MG/DL — HIGH (ref 2.5–4.5)
PHOSPHATE SERPL-MCNC: 8.8 MG/DL — HIGH (ref 2.5–4.5)
PHOSPHATE SERPL-MCNC: 9.3 MG/DL — HIGH (ref 2.5–4.5)
PLAT MORPH BLD: ABNORMAL
PLAT MORPH BLD: NORMAL — SIGNIFICANT CHANGE UP
PLATELET # BLD AUTO: 120 K/UL — LOW (ref 150–400)
PLATELET # BLD AUTO: 140 K/UL — LOW (ref 150–400)
PLATELET # BLD AUTO: 143 K/UL — LOW (ref 150–400)
PLATELET # BLD AUTO: 144 K/UL — LOW (ref 150–400)
PLATELET # BLD AUTO: 145 K/UL — LOW (ref 150–400)
PLATELET # BLD AUTO: 145 K/UL — LOW (ref 150–400)
PLATELET # BLD AUTO: 146 K/UL — LOW (ref 150–400)
PLATELET # BLD AUTO: 148 K/UL — LOW (ref 150–400)
PLATELET # BLD AUTO: 149 K/UL — LOW (ref 150–400)
PLATELET # BLD AUTO: 151 K/UL — SIGNIFICANT CHANGE UP (ref 150–400)
PLATELET # BLD AUTO: 158 K/UL — SIGNIFICANT CHANGE UP (ref 150–400)
PLATELET # BLD AUTO: 158 K/UL — SIGNIFICANT CHANGE UP (ref 150–400)
PLATELET # BLD AUTO: 159 K/UL — SIGNIFICANT CHANGE UP (ref 150–400)
PLATELET # BLD AUTO: 162 K/UL — SIGNIFICANT CHANGE UP (ref 150–400)
PLATELET # BLD AUTO: 163 K/UL — SIGNIFICANT CHANGE UP (ref 150–400)
PLATELET # BLD AUTO: 164 K/UL — SIGNIFICANT CHANGE UP (ref 150–400)
PLATELET # BLD AUTO: 166 K/UL — SIGNIFICANT CHANGE UP (ref 150–400)
PLATELET # BLD AUTO: 168 K/UL — SIGNIFICANT CHANGE UP (ref 150–400)
PLATELET # BLD AUTO: 169 K/UL — SIGNIFICANT CHANGE UP (ref 150–400)
PLATELET # BLD AUTO: 169 K/UL — SIGNIFICANT CHANGE UP (ref 150–400)
PLATELET # BLD AUTO: 171 K/UL — SIGNIFICANT CHANGE UP (ref 150–400)
PLATELET # BLD AUTO: 173 K/UL — SIGNIFICANT CHANGE UP (ref 150–400)
PLATELET # BLD AUTO: 176 K/UL — SIGNIFICANT CHANGE UP (ref 150–400)
PLATELET # BLD AUTO: 178 K/UL — SIGNIFICANT CHANGE UP (ref 150–400)
PLATELET # BLD AUTO: 179 K/UL — SIGNIFICANT CHANGE UP (ref 150–400)
PLATELET # BLD AUTO: 180 K/UL — SIGNIFICANT CHANGE UP (ref 150–400)
PLATELET # BLD AUTO: 181 K/UL — SIGNIFICANT CHANGE UP (ref 150–400)
PLATELET # BLD AUTO: 182 K/UL — SIGNIFICANT CHANGE UP (ref 150–400)
PLATELET # BLD AUTO: 184 K/UL — SIGNIFICANT CHANGE UP (ref 150–400)
PLATELET # BLD AUTO: 185 K/UL — SIGNIFICANT CHANGE UP (ref 150–400)
PLATELET # BLD AUTO: 186 K/UL — SIGNIFICANT CHANGE UP (ref 150–400)
PLATELET # BLD AUTO: 187 K/UL — SIGNIFICANT CHANGE UP (ref 150–400)
PLATELET # BLD AUTO: 190 K/UL — SIGNIFICANT CHANGE UP (ref 150–400)
PLATELET # BLD AUTO: 192 K/UL — SIGNIFICANT CHANGE UP (ref 150–400)
PLATELET # BLD AUTO: 195 K/UL — SIGNIFICANT CHANGE UP (ref 150–400)
PLATELET # BLD AUTO: 196 K/UL — SIGNIFICANT CHANGE UP (ref 150–400)
PLATELET # BLD AUTO: 197 K/UL — SIGNIFICANT CHANGE UP (ref 150–400)
PLATELET # BLD AUTO: 197 K/UL — SIGNIFICANT CHANGE UP (ref 150–400)
PLATELET # BLD AUTO: 198 K/UL — SIGNIFICANT CHANGE UP (ref 150–400)
PLATELET # BLD AUTO: 200 K/UL — SIGNIFICANT CHANGE UP (ref 150–400)
PLATELET # BLD AUTO: 201 K/UL — SIGNIFICANT CHANGE UP (ref 150–400)
PLATELET # BLD AUTO: 202 K/UL — SIGNIFICANT CHANGE UP (ref 150–400)
PLATELET # BLD AUTO: 203 K/UL — SIGNIFICANT CHANGE UP (ref 150–400)
PLATELET # BLD AUTO: 207 K/UL — SIGNIFICANT CHANGE UP (ref 150–400)
PLATELET # BLD AUTO: 211 K/UL — SIGNIFICANT CHANGE UP (ref 150–400)
PLATELET # BLD AUTO: 212 K/UL — SIGNIFICANT CHANGE UP (ref 150–400)
PLATELET # BLD AUTO: 212 K/UL — SIGNIFICANT CHANGE UP (ref 150–400)
PLATELET # BLD AUTO: 214 K/UL — SIGNIFICANT CHANGE UP (ref 150–400)
PLATELET # BLD AUTO: 217 K/UL — SIGNIFICANT CHANGE UP (ref 150–400)
PLATELET # BLD AUTO: 218 K/UL — SIGNIFICANT CHANGE UP (ref 150–400)
PLATELET # BLD AUTO: 221 K/UL — SIGNIFICANT CHANGE UP (ref 150–400)
PLATELET # BLD AUTO: 229 K/UL — SIGNIFICANT CHANGE UP (ref 150–400)
PLATELET # BLD AUTO: 229 K/UL — SIGNIFICANT CHANGE UP (ref 150–400)
PLATELET # BLD AUTO: 232 K/UL — SIGNIFICANT CHANGE UP (ref 150–400)
PLATELET # BLD AUTO: 232 K/UL — SIGNIFICANT CHANGE UP (ref 150–400)
PLATELET # BLD AUTO: 237 K/UL — SIGNIFICANT CHANGE UP (ref 150–400)
PLATELET # BLD AUTO: 237 K/UL — SIGNIFICANT CHANGE UP (ref 150–400)
PLATELET # BLD AUTO: 240 K/UL — SIGNIFICANT CHANGE UP (ref 150–400)
PLATELET # BLD AUTO: 243 K/UL — SIGNIFICANT CHANGE UP (ref 150–400)
PLATELET # BLD AUTO: 254 K/UL — SIGNIFICANT CHANGE UP (ref 150–400)
PLATELET # BLD AUTO: 259 K/UL — SIGNIFICANT CHANGE UP (ref 150–400)
PLATELET # BLD AUTO: 276 K/UL — SIGNIFICANT CHANGE UP (ref 150–400)
PLATELET # BLD AUTO: 280 K/UL — SIGNIFICANT CHANGE UP (ref 150–400)
PLATELET # BLD AUTO: 280 K/UL — SIGNIFICANT CHANGE UP (ref 150–400)
PLATELET # BLD AUTO: 287 K/UL — SIGNIFICANT CHANGE UP (ref 150–400)
PLATELET # BLD AUTO: 288 K/UL — SIGNIFICANT CHANGE UP (ref 150–400)
PLATELET # BLD AUTO: 294 K/UL — SIGNIFICANT CHANGE UP (ref 150–400)
PLATELET # BLD AUTO: 295 K/UL — SIGNIFICANT CHANGE UP (ref 150–400)
PLATELET # BLD AUTO: 301 K/UL — SIGNIFICANT CHANGE UP (ref 150–400)
PLATELET # BLD AUTO: 302 K/UL — SIGNIFICANT CHANGE UP (ref 150–400)
PLATELET # BLD AUTO: 303 K/UL — SIGNIFICANT CHANGE UP (ref 150–400)
PLATELET # BLD AUTO: 305 K/UL — SIGNIFICANT CHANGE UP (ref 150–400)
PLATELET # BLD AUTO: 306 K/UL — SIGNIFICANT CHANGE UP (ref 150–400)
PLATELET # BLD AUTO: 306 K/UL — SIGNIFICANT CHANGE UP (ref 150–400)
PLATELET # BLD AUTO: 310 K/UL — SIGNIFICANT CHANGE UP (ref 150–400)
PLATELET # BLD AUTO: 314 K/UL — SIGNIFICANT CHANGE UP (ref 150–400)
PLATELET # BLD AUTO: 321 K/UL — SIGNIFICANT CHANGE UP (ref 150–400)
PLATELET # BLD AUTO: 321 K/UL — SIGNIFICANT CHANGE UP (ref 150–400)
PLATELET # BLD AUTO: 331 K/UL — SIGNIFICANT CHANGE UP (ref 150–400)
PLATELET # BLD AUTO: 331 K/UL — SIGNIFICANT CHANGE UP (ref 150–400)
PLATELET # BLD AUTO: 337 K/UL — SIGNIFICANT CHANGE UP (ref 150–400)
PLATELET # BLD AUTO: 337 K/UL — SIGNIFICANT CHANGE UP (ref 150–400)
PLATELET # BLD AUTO: 340 K/UL — SIGNIFICANT CHANGE UP (ref 150–400)
PLATELET # BLD AUTO: 340 K/UL — SIGNIFICANT CHANGE UP (ref 150–400)
PLATELET # BLD AUTO: 341 K/UL — SIGNIFICANT CHANGE UP (ref 150–400)
PLATELET # BLD AUTO: 343 K/UL — SIGNIFICANT CHANGE UP (ref 150–400)
PLATELET # BLD AUTO: 344 K/UL — SIGNIFICANT CHANGE UP (ref 150–400)
PLATELET # BLD AUTO: 345 K/UL — SIGNIFICANT CHANGE UP (ref 150–400)
PLATELET # BLD AUTO: 348 K/UL — SIGNIFICANT CHANGE UP (ref 150–400)
PLATELET # BLD AUTO: 351 K/UL — SIGNIFICANT CHANGE UP (ref 150–400)
PLATELET # BLD AUTO: 352 K/UL — SIGNIFICANT CHANGE UP (ref 150–400)
PLATELET # BLD AUTO: 352 K/UL — SIGNIFICANT CHANGE UP (ref 150–400)
PLATELET # BLD AUTO: 356 K/UL — SIGNIFICANT CHANGE UP (ref 150–400)
PLATELET # BLD AUTO: 356 K/UL — SIGNIFICANT CHANGE UP (ref 150–400)
PLATELET # BLD AUTO: 359 K/UL — SIGNIFICANT CHANGE UP (ref 150–400)
PLATELET # BLD AUTO: 359 K/UL — SIGNIFICANT CHANGE UP (ref 150–400)
PLATELET # BLD AUTO: 360 K/UL — SIGNIFICANT CHANGE UP (ref 150–400)
PLATELET # BLD AUTO: 372 K/UL — SIGNIFICANT CHANGE UP (ref 150–400)
PLATELET # BLD AUTO: 372 K/UL — SIGNIFICANT CHANGE UP (ref 150–400)
PLATELET # BLD AUTO: 373 K/UL — SIGNIFICANT CHANGE UP (ref 150–400)
PLATELET # BLD AUTO: 374 K/UL — SIGNIFICANT CHANGE UP (ref 150–400)
PLATELET # BLD AUTO: 374 K/UL — SIGNIFICANT CHANGE UP (ref 150–400)
PLATELET # BLD AUTO: 379 K/UL — SIGNIFICANT CHANGE UP (ref 150–400)
PLATELET # BLD AUTO: 379 K/UL — SIGNIFICANT CHANGE UP (ref 150–400)
PLATELET # BLD AUTO: 382 K/UL — SIGNIFICANT CHANGE UP (ref 150–400)
PLATELET # BLD AUTO: 383 K/UL — SIGNIFICANT CHANGE UP (ref 150–400)
PLATELET # BLD AUTO: 383 K/UL — SIGNIFICANT CHANGE UP (ref 150–400)
PLATELET # BLD AUTO: 388 K/UL — SIGNIFICANT CHANGE UP (ref 150–400)
PLATELET # BLD AUTO: 394 K/UL — SIGNIFICANT CHANGE UP (ref 150–400)
PLATELET # BLD AUTO: 397 K/UL — SIGNIFICANT CHANGE UP (ref 150–400)
PLATELET # BLD AUTO: 399 K/UL — SIGNIFICANT CHANGE UP (ref 150–400)
PLATELET # BLD AUTO: 410 K/UL — HIGH (ref 150–400)
PLATELET # BLD AUTO: 410 K/UL — HIGH (ref 150–400)
PLATELET # BLD AUTO: 411 K/UL — HIGH (ref 150–400)
PLATELET # BLD AUTO: 411 K/UL — HIGH (ref 150–400)
PLATELET # BLD AUTO: 422 K/UL — HIGH (ref 150–400)
PLATELET # BLD AUTO: 434 K/UL — HIGH (ref 150–400)
PLATELET # BLD AUTO: 434 K/UL — HIGH (ref 150–400)
PLATELET # BLD AUTO: 436 K/UL — HIGH (ref 150–400)
PLATELET # BLD AUTO: 436 K/UL — HIGH (ref 150–400)
PLATELET # BLD AUTO: 437 K/UL — HIGH (ref 150–400)
PLATELET # BLD AUTO: 437 K/UL — HIGH (ref 150–400)
PLATELET # BLD AUTO: 440 K/UL — HIGH (ref 150–400)
PLATELET # BLD AUTO: 440 K/UL — HIGH (ref 150–400)
PLATELET # BLD AUTO: 446 K/UL — HIGH (ref 150–400)
PLATELET # BLD AUTO: 446 K/UL — HIGH (ref 150–400)
PLATELET # BLD AUTO: 450 K/UL — HIGH (ref 150–400)
PLATELET # BLD AUTO: 450 K/UL — HIGH (ref 150–400)
PLATELET # BLD AUTO: 451 K/UL — HIGH (ref 150–400)
PLATELET # BLD AUTO: 451 K/UL — HIGH (ref 150–400)
PLATELET # BLD AUTO: 454 K/UL — HIGH (ref 150–400)
PLATELET # BLD AUTO: 454 K/UL — HIGH (ref 150–400)
PLATELET # BLD AUTO: 459 K/UL — HIGH (ref 150–400)
PLATELET # BLD AUTO: 459 K/UL — HIGH (ref 150–400)
PLATELET # BLD AUTO: 463 K/UL — HIGH (ref 150–400)
PLATELET # BLD AUTO: 463 K/UL — HIGH (ref 150–400)
PLATELET # BLD AUTO: 464 K/UL — HIGH (ref 150–400)
PLATELET # BLD AUTO: 466 K/UL — HIGH (ref 150–400)
PLATELET # BLD AUTO: 466 K/UL — HIGH (ref 150–400)
PLATELET # BLD AUTO: 467 K/UL — HIGH (ref 150–400)
PLATELET # BLD AUTO: 471 K/UL — HIGH (ref 150–400)
PLATELET # BLD AUTO: 471 K/UL — HIGH (ref 150–400)
PLATELET # BLD AUTO: 473 K/UL — HIGH (ref 150–400)
PLATELET # BLD AUTO: 485 K/UL — HIGH (ref 150–400)
PLATELET # BLD AUTO: 485 K/UL — HIGH (ref 150–400)
PLATELET # BLD AUTO: 486 K/UL — HIGH (ref 150–400)
PLATELET # BLD AUTO: 486 K/UL — HIGH (ref 150–400)
PLATELET # BLD AUTO: 491 K/UL — HIGH (ref 150–400)
PLATELET # BLD AUTO: 491 K/UL — HIGH (ref 150–400)
PLATELET # BLD AUTO: 493 K/UL — HIGH (ref 150–400)
PLATELET # BLD AUTO: 499 K/UL — HIGH (ref 150–400)
PLATELET # BLD AUTO: 499 K/UL — HIGH (ref 150–400)
PLATELET # BLD AUTO: 512 K/UL — HIGH (ref 150–400)
PLATELET # BLD AUTO: 512 K/UL — HIGH (ref 150–400)
PLATELET # BLD AUTO: 515 K/UL — HIGH (ref 150–400)
PLATELET # BLD AUTO: 515 K/UL — HIGH (ref 150–400)
PLATELET # BLD AUTO: 520 K/UL — HIGH (ref 150–400)
PLATELET # BLD AUTO: 520 K/UL — HIGH (ref 150–400)
PLATELET # BLD AUTO: 536 K/UL — HIGH (ref 150–400)
PLATELET # BLD AUTO: 538 K/UL — HIGH (ref 150–400)
PLATELET # BLD AUTO: 538 K/UL — HIGH (ref 150–400)
PLATELET # BLD AUTO: 551 K/UL — HIGH (ref 150–400)
PLATELET # BLD AUTO: 551 K/UL — HIGH (ref 150–400)
PLATELET # BLD AUTO: 560 K/UL — HIGH (ref 150–400)
PLATELET # BLD AUTO: 560 K/UL — HIGH (ref 150–400)
PLATELET # BLD AUTO: 562 K/UL — HIGH (ref 150–400)
PLATELET # BLD AUTO: 562 K/UL — HIGH (ref 150–400)
PLATELET # BLD AUTO: 578 K/UL — HIGH (ref 150–400)
PLATELET # BLD AUTO: 578 K/UL — HIGH (ref 150–400)
PLATELET # BLD AUTO: 584 K/UL — HIGH (ref 150–400)
PLATELET # BLD AUTO: 584 K/UL — HIGH (ref 150–400)
PLATELET COUNT - ESTIMATE: ABNORMAL
PLATELET COUNT - ESTIMATE: ABNORMAL
PLATELET COUNT - ESTIMATE: NORMAL — SIGNIFICANT CHANGE UP
PO2 BLDA: 117 MMHG — HIGH (ref 83–108)
PO2 BLDA: 117 MMHG — HIGH (ref 83–108)
PO2 BLDA: 121 MMHG — HIGH (ref 83–108)
PO2 BLDA: 127 MMHG — HIGH (ref 83–108)
PO2 BLDA: 171 MMHG — HIGH (ref 83–108)
PO2 BLDA: 171 MMHG — HIGH (ref 83–108)
PO2 BLDA: 186 MMHG — HIGH (ref 83–108)
PO2 BLDA: 46 MMHG — CRITICAL LOW (ref 83–108)
PO2 BLDA: 75 MMHG — LOW (ref 83–108)
PO2 BLDA: 90 MMHG — SIGNIFICANT CHANGE UP (ref 83–108)
PO2 BLDA: 90 MMHG — SIGNIFICANT CHANGE UP (ref 83–108)
PO2 BLDV: 38 MMHG — SIGNIFICANT CHANGE UP (ref 25–45)
PO2 BLDV: 39 MMHG — SIGNIFICANT CHANGE UP (ref 25–45)
PO2 BLDV: 44 MMHG — SIGNIFICANT CHANGE UP (ref 25–45)
PO2 BLDV: 46 MMHG — HIGH (ref 25–45)
PO2 BLDV: 47 MMHG — HIGH (ref 25–45)
PO2 BLDV: 47 MMHG — HIGH (ref 25–45)
PO2 BLDV: 48 MMHG — HIGH (ref 25–45)
PO2 BLDV: 50 MMHG — HIGH (ref 25–45)
PO2 BLDV: 51 MMHG — HIGH (ref 25–45)
PO2 BLDV: 52 MMHG — HIGH (ref 25–45)
PO2 BLDV: 53 MMHG — HIGH (ref 25–45)
PO2 BLDV: 55 MMHG — HIGH (ref 25–45)
PO2 BLDV: 56 MMHG — HIGH (ref 25–45)
PO2 BLDV: 56 MMHG — HIGH (ref 25–45)
PO2 BLDV: 57 MMHG — HIGH (ref 25–45)
PO2 BLDV: 57 MMHG — HIGH (ref 25–45)
PO2 BLDV: 62 MMHG — HIGH (ref 25–45)
PO2 BLDV: 70 MMHG — HIGH (ref 25–45)
PO2 BLDV: 75 MMHG — HIGH (ref 25–45)
PO2 BLDV: 75 MMHG — HIGH (ref 25–45)
PO2 BLDV: 86 MMHG — HIGH (ref 25–45)
PO2 BLDV: 91 MMHG — HIGH (ref 25–45)
POIKILOCYTOSIS BLD QL AUTO: SLIGHT — SIGNIFICANT CHANGE UP
POLYCHROMASIA BLD QL SMEAR: SIGNIFICANT CHANGE UP
POLYCHROMASIA BLD QL SMEAR: SLIGHT — SIGNIFICANT CHANGE UP
POTASSIUM BLDV-SCNC: 3 MMOL/L — LOW (ref 3.5–5.1)
POTASSIUM BLDV-SCNC: 3.5 MMOL/L — SIGNIFICANT CHANGE UP (ref 3.5–5.1)
POTASSIUM BLDV-SCNC: 3.6 MMOL/L — SIGNIFICANT CHANGE UP (ref 3.5–5.1)
POTASSIUM BLDV-SCNC: 3.8 MMOL/L — SIGNIFICANT CHANGE UP (ref 3.5–5.1)
POTASSIUM BLDV-SCNC: 3.8 MMOL/L — SIGNIFICANT CHANGE UP (ref 3.5–5.1)
POTASSIUM BLDV-SCNC: 3.9 MMOL/L — SIGNIFICANT CHANGE UP (ref 3.5–5.1)
POTASSIUM BLDV-SCNC: 4.1 MMOL/L — SIGNIFICANT CHANGE UP (ref 3.5–5.1)
POTASSIUM BLDV-SCNC: 4.1 MMOL/L — SIGNIFICANT CHANGE UP (ref 3.5–5.1)
POTASSIUM BLDV-SCNC: 4.2 MMOL/L — SIGNIFICANT CHANGE UP (ref 3.5–5.1)
POTASSIUM BLDV-SCNC: 4.2 MMOL/L — SIGNIFICANT CHANGE UP (ref 3.5–5.1)
POTASSIUM BLDV-SCNC: 4.4 MMOL/L — SIGNIFICANT CHANGE UP (ref 3.5–5.1)
POTASSIUM BLDV-SCNC: 4.6 MMOL/L — SIGNIFICANT CHANGE UP (ref 3.5–5.1)
POTASSIUM BLDV-SCNC: 4.6 MMOL/L — SIGNIFICANT CHANGE UP (ref 3.5–5.1)
POTASSIUM BLDV-SCNC: 4.7 MMOL/L — SIGNIFICANT CHANGE UP (ref 3.5–5.1)
POTASSIUM BLDV-SCNC: 4.8 MMOL/L — SIGNIFICANT CHANGE UP (ref 3.5–5.1)
POTASSIUM BLDV-SCNC: 4.8 MMOL/L — SIGNIFICANT CHANGE UP (ref 3.5–5.1)
POTASSIUM BLDV-SCNC: 5.1 MMOL/L — SIGNIFICANT CHANGE UP (ref 3.5–5.1)
POTASSIUM BLDV-SCNC: 5.4 MMOL/L — HIGH (ref 3.5–5.1)
POTASSIUM BLDV-SCNC: 5.5 MMOL/L — HIGH (ref 3.5–5.1)
POTASSIUM BLDV-SCNC: 6.7 MMOL/L — CRITICAL HIGH (ref 3.5–5.1)
POTASSIUM SERPL-MCNC: 3.1 MMOL/L — LOW (ref 3.5–5.3)
POTASSIUM SERPL-MCNC: 3.3 MMOL/L — LOW (ref 3.5–5.3)
POTASSIUM SERPL-MCNC: 3.5 MMOL/L — SIGNIFICANT CHANGE UP (ref 3.5–5.3)
POTASSIUM SERPL-MCNC: 3.6 MMOL/L — SIGNIFICANT CHANGE UP (ref 3.5–5.3)
POTASSIUM SERPL-MCNC: 3.7 MMOL/L — SIGNIFICANT CHANGE UP (ref 3.5–5.3)
POTASSIUM SERPL-MCNC: 3.8 MMOL/L — SIGNIFICANT CHANGE UP (ref 3.5–5.3)
POTASSIUM SERPL-MCNC: 3.9 MMOL/L — SIGNIFICANT CHANGE UP (ref 3.5–5.3)
POTASSIUM SERPL-MCNC: 4 MMOL/L — SIGNIFICANT CHANGE UP (ref 3.5–5.3)
POTASSIUM SERPL-MCNC: 4.1 MMOL/L — SIGNIFICANT CHANGE UP (ref 3.5–5.3)
POTASSIUM SERPL-MCNC: 4.2 MMOL/L — SIGNIFICANT CHANGE UP (ref 3.5–5.3)
POTASSIUM SERPL-MCNC: 4.3 MMOL/L — SIGNIFICANT CHANGE UP (ref 3.5–5.3)
POTASSIUM SERPL-MCNC: 4.4 MMOL/L — SIGNIFICANT CHANGE UP (ref 3.5–5.3)
POTASSIUM SERPL-MCNC: 4.5 MMOL/L — SIGNIFICANT CHANGE UP (ref 3.5–5.3)
POTASSIUM SERPL-MCNC: 4.6 MMOL/L — SIGNIFICANT CHANGE UP (ref 3.5–5.3)
POTASSIUM SERPL-MCNC: 4.7 MMOL/L — SIGNIFICANT CHANGE UP (ref 3.5–5.3)
POTASSIUM SERPL-MCNC: 4.8 MMOL/L — SIGNIFICANT CHANGE UP (ref 3.5–5.3)
POTASSIUM SERPL-MCNC: 4.9 MMOL/L — SIGNIFICANT CHANGE UP (ref 3.5–5.3)
POTASSIUM SERPL-MCNC: 5 MMOL/L — SIGNIFICANT CHANGE UP (ref 3.5–5.3)
POTASSIUM SERPL-MCNC: 5.1 MMOL/L — SIGNIFICANT CHANGE UP (ref 3.5–5.3)
POTASSIUM SERPL-MCNC: 5.2 MMOL/L — SIGNIFICANT CHANGE UP (ref 3.5–5.3)
POTASSIUM SERPL-MCNC: 5.3 MMOL/L — SIGNIFICANT CHANGE UP (ref 3.5–5.3)
POTASSIUM SERPL-MCNC: 5.4 MMOL/L — HIGH (ref 3.5–5.3)
POTASSIUM SERPL-MCNC: 5.4 MMOL/L — HIGH (ref 3.5–5.3)
POTASSIUM SERPL-MCNC: 5.7 MMOL/L — HIGH (ref 3.5–5.3)
POTASSIUM SERPL-MCNC: 5.7 MMOL/L — HIGH (ref 3.5–5.3)
POTASSIUM SERPL-MCNC: 6 MMOL/L — HIGH (ref 3.5–5.3)
POTASSIUM SERPL-MCNC: 7.2 MMOL/L — CRITICAL HIGH (ref 3.5–5.3)
POTASSIUM SERPL-SCNC: 3.1 MMOL/L — LOW (ref 3.5–5.3)
POTASSIUM SERPL-SCNC: 3.3 MMOL/L — LOW (ref 3.5–5.3)
POTASSIUM SERPL-SCNC: 3.5 MMOL/L — SIGNIFICANT CHANGE UP (ref 3.5–5.3)
POTASSIUM SERPL-SCNC: 3.6 MMOL/L — SIGNIFICANT CHANGE UP (ref 3.5–5.3)
POTASSIUM SERPL-SCNC: 3.7 MMOL/L — SIGNIFICANT CHANGE UP (ref 3.5–5.3)
POTASSIUM SERPL-SCNC: 3.8 MMOL/L — SIGNIFICANT CHANGE UP (ref 3.5–5.3)
POTASSIUM SERPL-SCNC: 3.9 MMOL/L — SIGNIFICANT CHANGE UP (ref 3.5–5.3)
POTASSIUM SERPL-SCNC: 4 MMOL/L — SIGNIFICANT CHANGE UP (ref 3.5–5.3)
POTASSIUM SERPL-SCNC: 4.1 MMOL/L — SIGNIFICANT CHANGE UP (ref 3.5–5.3)
POTASSIUM SERPL-SCNC: 4.2 MMOL/L — SIGNIFICANT CHANGE UP (ref 3.5–5.3)
POTASSIUM SERPL-SCNC: 4.3 MMOL/L — SIGNIFICANT CHANGE UP (ref 3.5–5.3)
POTASSIUM SERPL-SCNC: 4.4 MMOL/L — SIGNIFICANT CHANGE UP (ref 3.5–5.3)
POTASSIUM SERPL-SCNC: 4.5 MMOL/L — SIGNIFICANT CHANGE UP (ref 3.5–5.3)
POTASSIUM SERPL-SCNC: 4.6 MMOL/L — SIGNIFICANT CHANGE UP (ref 3.5–5.3)
POTASSIUM SERPL-SCNC: 4.7 MMOL/L — SIGNIFICANT CHANGE UP (ref 3.5–5.3)
POTASSIUM SERPL-SCNC: 4.8 MMOL/L — SIGNIFICANT CHANGE UP (ref 3.5–5.3)
POTASSIUM SERPL-SCNC: 4.9 MMOL/L — SIGNIFICANT CHANGE UP (ref 3.5–5.3)
POTASSIUM SERPL-SCNC: 5 MMOL/L — SIGNIFICANT CHANGE UP (ref 3.5–5.3)
POTASSIUM SERPL-SCNC: 5.1 MMOL/L — SIGNIFICANT CHANGE UP (ref 3.5–5.3)
POTASSIUM SERPL-SCNC: 5.2 MMOL/L — SIGNIFICANT CHANGE UP (ref 3.5–5.3)
POTASSIUM SERPL-SCNC: 5.3 MMOL/L — SIGNIFICANT CHANGE UP (ref 3.5–5.3)
POTASSIUM SERPL-SCNC: 5.4 MMOL/L — HIGH (ref 3.5–5.3)
POTASSIUM SERPL-SCNC: 5.4 MMOL/L — HIGH (ref 3.5–5.3)
POTASSIUM SERPL-SCNC: 5.7 MMOL/L — HIGH (ref 3.5–5.3)
POTASSIUM SERPL-SCNC: 5.7 MMOL/L — HIGH (ref 3.5–5.3)
POTASSIUM SERPL-SCNC: 6 MMOL/L — HIGH (ref 3.5–5.3)
POTASSIUM SERPL-SCNC: 7.2 MMOL/L — CRITICAL HIGH (ref 3.5–5.3)
POTASSIUM UR-SCNC: 17.3 MMOL/L — SIGNIFICANT CHANGE UP
POTASSIUM UR-SCNC: 39 MMOL/L — SIGNIFICANT CHANGE UP
PROCALCITONIN SERPL-MCNC: 1.5 NG/ML — HIGH (ref 0.02–0.1)
PROLACTIN SERPL-MCNC: 18.5 NG/ML — SIGNIFICANT CHANGE UP (ref 3.4–24.1)
PROT FLD-MCNC: 3.2 G/DL — SIGNIFICANT CHANGE UP
PROT FLD-MCNC: 3.2 G/DL — SIGNIFICANT CHANGE UP
PROT SERPL-MCNC: 4.3 G/DL — LOW (ref 6–8.3)
PROT SERPL-MCNC: 4.3 G/DL — LOW (ref 6–8.3)
PROT SERPL-MCNC: 4.9 G/DL — LOW (ref 6–8.3)
PROT SERPL-MCNC: 4.9 G/DL — LOW (ref 6–8.3)
PROT SERPL-MCNC: 5 G/DL — LOW (ref 6–8.3)
PROT SERPL-MCNC: 5.1 G/DL — LOW (ref 6–8.3)
PROT SERPL-MCNC: 5.1 G/DL — LOW (ref 6–8.3)
PROT SERPL-MCNC: 5.3 G/DL — LOW (ref 6–8.3)
PROT SERPL-MCNC: 5.4 G/DL — LOW (ref 6–8.3)
PROT SERPL-MCNC: 5.5 G/DL — LOW (ref 6–8.3)
PROT SERPL-MCNC: 5.6 G/DL — LOW (ref 6–8.3)
PROT SERPL-MCNC: 5.7 G/DL — LOW (ref 6–8.3)
PROT SERPL-MCNC: 5.8 G/DL — LOW (ref 6–8.3)
PROT SERPL-MCNC: 5.9 G/DL — LOW (ref 6–8.3)
PROT SERPL-MCNC: 6 G/DL — SIGNIFICANT CHANGE UP (ref 6–8.3)
PROT SERPL-MCNC: 6.1 G/DL — SIGNIFICANT CHANGE UP (ref 6–8.3)
PROT SERPL-MCNC: 6.2 G/DL — SIGNIFICANT CHANGE UP (ref 6–8.3)
PROT SERPL-MCNC: 6.3 G/DL — SIGNIFICANT CHANGE UP (ref 6–8.3)
PROT SERPL-MCNC: 6.4 G/DL — SIGNIFICANT CHANGE UP (ref 6–8.3)
PROT SERPL-MCNC: 6.5 G/DL — SIGNIFICANT CHANGE UP (ref 6–8.3)
PROT SERPL-MCNC: 6.6 G/DL — SIGNIFICANT CHANGE UP (ref 6–8.3)
PROT SERPL-MCNC: 6.7 G/DL — SIGNIFICANT CHANGE UP (ref 6–8.3)
PROT SERPL-MCNC: 6.8 G/DL — SIGNIFICANT CHANGE UP (ref 6–8.3)
PROT SERPL-MCNC: 6.8 G/DL — SIGNIFICANT CHANGE UP (ref 6–8.3)
PROT SERPL-MCNC: 6.9 G/DL — SIGNIFICANT CHANGE UP (ref 6–8.3)
PROT SERPL-MCNC: 7 G/DL — SIGNIFICANT CHANGE UP (ref 6–8.3)
PROT SERPL-MCNC: 7.7 G/DL — SIGNIFICANT CHANGE UP (ref 6–8.3)
PROT SERPL-MCNC: 7.8 G/DL — SIGNIFICANT CHANGE UP (ref 6–8.3)
PROT SERPL-MCNC: 8.2 G/DL — SIGNIFICANT CHANGE UP (ref 6–8.3)
PROT UR-MCNC: 100 MG/DL
PROT UR-MCNC: 300 MG/DL
PROT UR-MCNC: ABNORMAL
PROTHROM AB SERPL-ACNC: 10.1 SEC — SIGNIFICANT CHANGE UP (ref 9.5–13)
PROTHROM AB SERPL-ACNC: 10.7 SEC — SIGNIFICANT CHANGE UP (ref 9.5–13)
PROTHROM AB SERPL-ACNC: 10.9 SEC — SIGNIFICANT CHANGE UP (ref 9.5–13)
PROTHROM AB SERPL-ACNC: 11.4 SEC — SIGNIFICANT CHANGE UP (ref 9.5–13)
PROTHROM AB SERPL-ACNC: 11.5 SEC — SIGNIFICANT CHANGE UP (ref 9.5–13)
PROTHROM AB SERPL-ACNC: 11.6 SEC — SIGNIFICANT CHANGE UP (ref 9.5–13)
PROTHROM AB SERPL-ACNC: 11.6 SEC — SIGNIFICANT CHANGE UP (ref 9.5–13)
PROTHROM AB SERPL-ACNC: 11.8 SEC — SIGNIFICANT CHANGE UP (ref 9.5–13)
PROTHROM AB SERPL-ACNC: 11.9 SEC — SIGNIFICANT CHANGE UP (ref 9.5–13)
PROTHROM AB SERPL-ACNC: 12 SEC — SIGNIFICANT CHANGE UP (ref 9.5–13)
PROTHROM AB SERPL-ACNC: 12 SEC — SIGNIFICANT CHANGE UP (ref 9.5–13)
PROTHROM AB SERPL-ACNC: 12.1 SEC — SIGNIFICANT CHANGE UP (ref 9.5–13)
PROTHROM AB SERPL-ACNC: 12.2 SEC — SIGNIFICANT CHANGE UP (ref 9.5–13)
PROTHROM AB SERPL-ACNC: 12.3 SEC — SIGNIFICANT CHANGE UP (ref 9.5–13)
PROTHROM AB SERPL-ACNC: 12.3 SEC — SIGNIFICANT CHANGE UP (ref 9.5–13)
PROTHROM AB SERPL-ACNC: 12.4 SEC — SIGNIFICANT CHANGE UP (ref 9.5–13)
PROTHROM AB SERPL-ACNC: 12.4 SEC — SIGNIFICANT CHANGE UP (ref 9.5–13)
PROTHROM AB SERPL-ACNC: 13.1 SEC — HIGH (ref 9.5–13)
PROTHROM AB SERPL-ACNC: 13.4 SEC — HIGH (ref 9.5–13)
PROTHROM AB SERPL-ACNC: 13.9 SEC — HIGH (ref 9.5–13)
PROTHROM AB SERPL-ACNC: 14.1 SEC — HIGH (ref 9.5–13)
PROTHROM AB SERPL-ACNC: 17.9 SEC — HIGH (ref 9.5–13)
PROTHROM AB SERPL-ACNC: 17.9 SEC — HIGH (ref 9.5–13)
PROTHROM AB SERPL-ACNC: 18.2 SEC — HIGH (ref 9.5–13)
PROTHROM AB SERPL-ACNC: 18.2 SEC — HIGH (ref 9.5–13)
RAPID RVP RESULT: SIGNIFICANT CHANGE UP
RBC # BLD: 2.02 M/UL — LOW (ref 3.8–5.2)
RBC # BLD: 2.12 M/UL — LOW (ref 3.8–5.2)
RBC # BLD: 2.15 M/UL — LOW (ref 3.8–5.2)
RBC # BLD: 2.15 M/UL — LOW (ref 3.8–5.2)
RBC # BLD: 2.17 M/UL — LOW (ref 3.8–5.2)
RBC # BLD: 2.18 M/UL — LOW (ref 3.8–5.2)
RBC # BLD: 2.21 M/UL — LOW (ref 3.8–5.2)
RBC # BLD: 2.21 M/UL — LOW (ref 3.8–5.2)
RBC # BLD: 2.22 M/UL — LOW (ref 3.8–5.2)
RBC # BLD: 2.26 M/UL — LOW (ref 3.8–5.2)
RBC # BLD: 2.29 M/UL — LOW (ref 3.8–5.2)
RBC # BLD: 2.29 M/UL — LOW (ref 3.8–5.2)
RBC # BLD: 2.3 M/UL — LOW (ref 3.8–5.2)
RBC # BLD: 2.31 M/UL — LOW (ref 3.8–5.2)
RBC # BLD: 2.33 M/UL — LOW (ref 3.8–5.2)
RBC # BLD: 2.33 M/UL — LOW (ref 3.8–5.2)
RBC # BLD: 2.34 M/UL — LOW (ref 3.8–5.2)
RBC # BLD: 2.35 M/UL — LOW (ref 3.8–5.2)
RBC # BLD: 2.37 M/UL — LOW (ref 3.8–5.2)
RBC # BLD: 2.37 M/UL — LOW (ref 3.8–5.2)
RBC # BLD: 2.38 M/UL — LOW (ref 3.8–5.2)
RBC # BLD: 2.4 M/UL — LOW (ref 3.8–5.2)
RBC # BLD: 2.42 M/UL — LOW (ref 3.8–5.2)
RBC # BLD: 2.42 M/UL — LOW (ref 3.8–5.2)
RBC # BLD: 2.44 M/UL — LOW (ref 3.8–5.2)
RBC # BLD: 2.45 M/UL — LOW (ref 3.8–5.2)
RBC # BLD: 2.46 M/UL — LOW (ref 3.8–5.2)
RBC # BLD: 2.47 M/UL — LOW (ref 3.8–5.2)
RBC # BLD: 2.48 M/UL — LOW (ref 3.8–5.2)
RBC # BLD: 2.49 M/UL — LOW (ref 3.8–5.2)
RBC # BLD: 2.49 M/UL — LOW (ref 3.8–5.2)
RBC # BLD: 2.5 M/UL — LOW (ref 3.8–5.2)
RBC # BLD: 2.5 M/UL — LOW (ref 3.8–5.2)
RBC # BLD: 2.51 M/UL — LOW (ref 3.8–5.2)
RBC # BLD: 2.52 M/UL — LOW (ref 3.8–5.2)
RBC # BLD: 2.52 M/UL — LOW (ref 3.8–5.2)
RBC # BLD: 2.53 M/UL — LOW (ref 3.8–5.2)
RBC # BLD: 2.54 M/UL — LOW (ref 3.8–5.2)
RBC # BLD: 2.55 M/UL — LOW (ref 3.8–5.2)
RBC # BLD: 2.56 M/UL — LOW (ref 3.8–5.2)
RBC # BLD: 2.57 M/UL — LOW (ref 3.8–5.2)
RBC # BLD: 2.58 M/UL — LOW (ref 3.8–5.2)
RBC # BLD: 2.59 M/UL — LOW (ref 3.8–5.2)
RBC # BLD: 2.59 M/UL — LOW (ref 3.8–5.2)
RBC # BLD: 2.6 M/UL — LOW (ref 3.8–5.2)
RBC # BLD: 2.61 M/UL — LOW (ref 3.8–5.2)
RBC # BLD: 2.62 M/UL — LOW (ref 3.8–5.2)
RBC # BLD: 2.64 M/UL — LOW (ref 3.8–5.2)
RBC # BLD: 2.64 M/UL — LOW (ref 3.8–5.2)
RBC # BLD: 2.65 M/UL — LOW (ref 3.8–5.2)
RBC # BLD: 2.66 M/UL — LOW (ref 3.8–5.2)
RBC # BLD: 2.67 M/UL — LOW (ref 3.8–5.2)
RBC # BLD: 2.68 M/UL — LOW (ref 3.8–5.2)
RBC # BLD: 2.7 M/UL — LOW (ref 3.8–5.2)
RBC # BLD: 2.7 M/UL — LOW (ref 3.8–5.2)
RBC # BLD: 2.71 M/UL — LOW (ref 3.8–5.2)
RBC # BLD: 2.72 M/UL — LOW (ref 3.8–5.2)
RBC # BLD: 2.73 M/UL — LOW (ref 3.8–5.2)
RBC # BLD: 2.73 M/UL — LOW (ref 3.8–5.2)
RBC # BLD: 2.75 M/UL — LOW (ref 3.8–5.2)
RBC # BLD: 2.76 M/UL — LOW (ref 3.8–5.2)
RBC # BLD: 2.76 M/UL — LOW (ref 3.8–5.2)
RBC # BLD: 2.78 M/UL — LOW (ref 3.8–5.2)
RBC # BLD: 2.78 M/UL — LOW (ref 3.8–5.2)
RBC # BLD: 2.81 M/UL — LOW (ref 3.8–5.2)
RBC # BLD: 2.82 M/UL — LOW (ref 3.8–5.2)
RBC # BLD: 2.83 M/UL — LOW (ref 3.8–5.2)
RBC # BLD: 2.84 M/UL — LOW (ref 3.8–5.2)
RBC # BLD: 2.84 M/UL — LOW (ref 3.8–5.2)
RBC # BLD: 2.85 M/UL — LOW (ref 3.8–5.2)
RBC # BLD: 2.88 M/UL — LOW (ref 3.8–5.2)
RBC # BLD: 2.88 M/UL — LOW (ref 3.8–5.2)
RBC # BLD: 2.89 M/UL — LOW (ref 3.8–5.2)
RBC # BLD: 2.89 M/UL — LOW (ref 3.8–5.2)
RBC # BLD: 2.9 M/UL — LOW (ref 3.8–5.2)
RBC # BLD: 2.91 M/UL — LOW (ref 3.8–5.2)
RBC # BLD: 2.91 M/UL — LOW (ref 3.8–5.2)
RBC # BLD: 2.92 M/UL — LOW (ref 3.8–5.2)
RBC # BLD: 2.93 M/UL — LOW (ref 3.8–5.2)
RBC # BLD: 2.94 M/UL — LOW (ref 3.8–5.2)
RBC # BLD: 2.94 M/UL — LOW (ref 3.8–5.2)
RBC # BLD: 2.95 M/UL — LOW (ref 3.8–5.2)
RBC # BLD: 2.96 M/UL — LOW (ref 3.8–5.2)
RBC # BLD: 2.97 M/UL — LOW (ref 3.8–5.2)
RBC # BLD: 2.98 M/UL — LOW (ref 3.8–5.2)
RBC # BLD: 2.98 M/UL — LOW (ref 3.8–5.2)
RBC # BLD: 2.99 M/UL — LOW (ref 3.8–5.2)
RBC # BLD: 3.01 M/UL — LOW (ref 3.8–5.2)
RBC # BLD: 3.02 M/UL — LOW (ref 3.8–5.2)
RBC # BLD: 3.03 M/UL — LOW (ref 3.8–5.2)
RBC # BLD: 3.03 M/UL — LOW (ref 3.8–5.2)
RBC # BLD: 3.04 M/UL — LOW (ref 3.8–5.2)
RBC # BLD: 3.05 M/UL — LOW (ref 3.8–5.2)
RBC # BLD: 3.05 M/UL — LOW (ref 3.8–5.2)
RBC # BLD: 3.07 M/UL — LOW (ref 3.8–5.2)
RBC # BLD: 3.08 M/UL — LOW (ref 3.8–5.2)
RBC # BLD: 3.09 M/UL — LOW (ref 3.8–5.2)
RBC # BLD: 3.11 M/UL — LOW (ref 3.8–5.2)
RBC # BLD: 3.11 M/UL — LOW (ref 3.8–5.2)
RBC # BLD: 3.12 M/UL — LOW (ref 3.8–5.2)
RBC # BLD: 3.12 M/UL — LOW (ref 3.8–5.2)
RBC # BLD: 3.14 M/UL — LOW (ref 3.8–5.2)
RBC # BLD: 3.16 M/UL — LOW (ref 3.8–5.2)
RBC # BLD: 3.18 M/UL — LOW (ref 3.8–5.2)
RBC # BLD: 3.25 M/UL — LOW (ref 3.8–5.2)
RBC # BLD: 3.25 M/UL — LOW (ref 3.8–5.2)
RBC # BLD: 3.28 M/UL — LOW (ref 3.8–5.2)
RBC # BLD: 3.28 M/UL — LOW (ref 3.8–5.2)
RBC # BLD: 3.3 M/UL — LOW (ref 3.8–5.2)
RBC # FLD: 13.2 % — SIGNIFICANT CHANGE UP (ref 10.3–14.5)
RBC # FLD: 13.3 % — SIGNIFICANT CHANGE UP (ref 10.3–14.5)
RBC # FLD: 13.4 % — SIGNIFICANT CHANGE UP (ref 10.3–14.5)
RBC # FLD: 13.5 % — SIGNIFICANT CHANGE UP (ref 10.3–14.5)
RBC # FLD: 13.6 % — SIGNIFICANT CHANGE UP (ref 10.3–14.5)
RBC # FLD: 13.6 % — SIGNIFICANT CHANGE UP (ref 10.3–14.5)
RBC # FLD: 13.9 % — SIGNIFICANT CHANGE UP (ref 10.3–14.5)
RBC # FLD: 14.1 % — SIGNIFICANT CHANGE UP (ref 10.3–14.5)
RBC # FLD: 14.2 % — SIGNIFICANT CHANGE UP (ref 10.3–14.5)
RBC # FLD: 14.2 % — SIGNIFICANT CHANGE UP (ref 10.3–14.5)
RBC # FLD: 14.3 % — SIGNIFICANT CHANGE UP (ref 10.3–14.5)
RBC # FLD: 14.3 % — SIGNIFICANT CHANGE UP (ref 10.3–14.5)
RBC # FLD: 14.6 % — HIGH (ref 10.3–14.5)
RBC # FLD: 14.7 % — HIGH (ref 10.3–14.5)
RBC # FLD: 14.8 % — HIGH (ref 10.3–14.5)
RBC # FLD: 14.8 % — HIGH (ref 10.3–14.5)
RBC # FLD: 14.9 % — HIGH (ref 10.3–14.5)
RBC # FLD: 14.9 % — HIGH (ref 10.3–14.5)
RBC # FLD: 15 % — HIGH (ref 10.3–14.5)
RBC # FLD: 15.1 % — HIGH (ref 10.3–14.5)
RBC # FLD: 15.1 % — HIGH (ref 10.3–14.5)
RBC # FLD: 15.3 % — HIGH (ref 10.3–14.5)
RBC # FLD: 15.4 % — HIGH (ref 10.3–14.5)
RBC # FLD: 15.5 % — HIGH (ref 10.3–14.5)
RBC # FLD: 15.5 % — HIGH (ref 10.3–14.5)
RBC # FLD: 15.6 % — HIGH (ref 10.3–14.5)
RBC # FLD: 15.7 % — HIGH (ref 10.3–14.5)
RBC # FLD: 15.8 % — HIGH (ref 10.3–14.5)
RBC # FLD: 15.9 % — HIGH (ref 10.3–14.5)
RBC # FLD: 16 % — HIGH (ref 10.3–14.5)
RBC # FLD: 16 % — HIGH (ref 10.3–14.5)
RBC # FLD: 16.1 % — HIGH (ref 10.3–14.5)
RBC # FLD: 16.2 % — HIGH (ref 10.3–14.5)
RBC # FLD: 16.2 % — HIGH (ref 10.3–14.5)
RBC # FLD: 16.3 % — HIGH (ref 10.3–14.5)
RBC # FLD: 16.3 % — HIGH (ref 10.3–14.5)
RBC # FLD: 16.4 % — HIGH (ref 10.3–14.5)
RBC # FLD: 16.5 % — HIGH (ref 10.3–14.5)
RBC # FLD: 16.6 % — HIGH (ref 10.3–14.5)
RBC # FLD: 16.7 % — HIGH (ref 10.3–14.5)
RBC # FLD: 16.8 % — HIGH (ref 10.3–14.5)
RBC # FLD: 16.9 % — HIGH (ref 10.3–14.5)
RBC # FLD: 17 % — HIGH (ref 10.3–14.5)
RBC # FLD: 17.1 % — HIGH (ref 10.3–14.5)
RBC # FLD: 17.2 % — HIGH (ref 10.3–14.5)
RBC # FLD: 17.3 % — HIGH (ref 10.3–14.5)
RBC # FLD: 17.4 % — HIGH (ref 10.3–14.5)
RBC # FLD: 17.6 % — HIGH (ref 10.3–14.5)
RBC # FLD: 17.7 % — HIGH (ref 10.3–14.5)
RBC # FLD: 17.8 % — HIGH (ref 10.3–14.5)
RBC # FLD: 18 % — HIGH (ref 10.3–14.5)
RBC # FLD: 18.1 % — HIGH (ref 10.3–14.5)
RBC # FLD: 18.2 % — HIGH (ref 10.3–14.5)
RBC # FLD: 18.3 % — HIGH (ref 10.3–14.5)
RBC # FLD: 18.4 % — HIGH (ref 10.3–14.5)
RBC # FLD: 18.4 % — HIGH (ref 10.3–14.5)
RBC # FLD: 18.5 % — HIGH (ref 10.3–14.5)
RBC # FLD: 18.5 % — HIGH (ref 10.3–14.5)
RBC # FLD: 18.6 % — HIGH (ref 10.3–14.5)
RBC # FLD: 18.7 % — HIGH (ref 10.3–14.5)
RBC # FLD: 18.9 % — HIGH (ref 10.3–14.5)
RBC # FLD: 18.9 % — HIGH (ref 10.3–14.5)
RBC # FLD: 19.1 % — HIGH (ref 10.3–14.5)
RBC # FLD: 19.3 % — HIGH (ref 10.3–14.5)
RBC # FLD: 19.5 % — HIGH (ref 10.3–14.5)
RBC # FLD: 19.5 % — HIGH (ref 10.3–14.5)
RBC # FLD: 19.9 % — HIGH (ref 10.3–14.5)
RBC # FLD: 20 % — HIGH (ref 10.3–14.5)
RBC # FLD: 20 % — HIGH (ref 10.3–14.5)
RBC # FLD: 20.1 % — HIGH (ref 10.3–14.5)
RBC # FLD: 20.1 % — HIGH (ref 10.3–14.5)
RBC # FLD: 20.2 % — HIGH (ref 10.3–14.5)
RBC # FLD: 20.3 % — HIGH (ref 10.3–14.5)
RBC # FLD: 20.4 % — HIGH (ref 10.3–14.5)
RBC # FLD: 20.4 % — HIGH (ref 10.3–14.5)
RBC # FLD: 20.5 % — HIGH (ref 10.3–14.5)
RBC # FLD: 20.5 % — HIGH (ref 10.3–14.5)
RBC # FLD: 20.6 % — HIGH (ref 10.3–14.5)
RBC # FLD: 20.6 % — HIGH (ref 10.3–14.5)
RBC # FLD: 21.3 % — HIGH (ref 10.3–14.5)
RBC # FLD: 21.5 % — HIGH (ref 10.3–14.5)
RBC # FLD: 21.5 % — HIGH (ref 10.3–14.5)
RBC # FLD: 22.2 % — HIGH (ref 10.3–14.5)
RBC # FLD: 22.2 % — HIGH (ref 10.3–14.5)
RBC BLD AUTO: ABNORMAL
RBC BLD AUTO: NORMAL — SIGNIFICANT CHANGE UP
RBC BLD AUTO: SIGNIFICANT CHANGE UP
RBC CASTS # UR COMP ASSIST: 1 /HPF — SIGNIFICANT CHANGE UP (ref 0–4)
RBC CASTS # UR COMP ASSIST: 11 /HPF — HIGH (ref 0–4)
RBC CASTS # UR COMP ASSIST: 116 /HPF — HIGH (ref 0–4)
RBC CASTS # UR COMP ASSIST: 211 /HPF — HIGH (ref 0–4)
RBC CASTS # UR COMP ASSIST: 24 /HPF — HIGH (ref 0–4)
RBC CASTS # UR COMP ASSIST: 43 /HPF — HIGH (ref 0–4)
RBC CASTS # UR COMP ASSIST: 7 /HPF — HIGH (ref 0–4)
RBC CASTS # UR COMP ASSIST: 92 /HPF — HIGH (ref 0–4)
RCV VOL RI: HIGH CELLS/UL (ref 0–5)
RCV VOL RI: HIGH CELLS/UL (ref 0–5)
RCV VOL RI: SIGNIFICANT CHANGE UP CELLS/UL (ref 0–5)
REVIEW: SIGNIFICANT CHANGE UP
RH IG SCN BLD-IMP: POSITIVE — SIGNIFICANT CHANGE UP
RSV RNA SPEC QL NAA+PROBE: SIGNIFICANT CHANGE UP
RV+EV RNA SPEC QL NAA+PROBE: SIGNIFICANT CHANGE UP
S AUREUS DNA NOSE QL NAA+PROBE: DETECTED
S AUREUS DNA NOSE QL NAA+PROBE: DETECTED
S AUREUS DNA NOSE QL NAA+PROBE: SIGNIFICANT CHANGE UP
SAO2 % BLDA: 100 % — HIGH (ref 94–98)
SAO2 % BLDA: 78.3 % — LOW (ref 94–98)
SAO2 % BLDA: 96.1 % — SIGNIFICANT CHANGE UP (ref 94–98)
SAO2 % BLDA: 96.1 % — SIGNIFICANT CHANGE UP (ref 94–98)
SAO2 % BLDA: 97.9 % — SIGNIFICANT CHANGE UP (ref 94–98)
SAO2 % BLDA: 98.5 % — HIGH (ref 94–98)
SAO2 % BLDA: 98.5 % — HIGH (ref 94–98)
SAO2 % BLDA: 99.1 % — HIGH (ref 94–98)
SAO2 % BLDA: 99.1 % — HIGH (ref 94–98)
SAO2 % BLDA: 99.3 % — HIGH (ref 94–98)
SAO2 % BLDA: 99.5 % — HIGH (ref 94–98)
SAO2 % BLDV: 71.7 % — SIGNIFICANT CHANGE UP (ref 67–88)
SAO2 % BLDV: 71.7 % — SIGNIFICANT CHANGE UP (ref 67–88)
SAO2 % BLDV: 73.7 % — SIGNIFICANT CHANGE UP (ref 67–88)
SAO2 % BLDV: 73.9 % — SIGNIFICANT CHANGE UP (ref 67–88)
SAO2 % BLDV: 76.1 % — SIGNIFICANT CHANGE UP (ref 67–88)
SAO2 % BLDV: 76.1 % — SIGNIFICANT CHANGE UP (ref 67–88)
SAO2 % BLDV: 77 % — SIGNIFICANT CHANGE UP (ref 67–88)
SAO2 % BLDV: 78.2 % — SIGNIFICANT CHANGE UP (ref 67–88)
SAO2 % BLDV: 78.2 % — SIGNIFICANT CHANGE UP (ref 67–88)
SAO2 % BLDV: 78.8 % — SIGNIFICANT CHANGE UP (ref 67–88)
SAO2 % BLDV: 78.8 % — SIGNIFICANT CHANGE UP (ref 67–88)
SAO2 % BLDV: 82 % — SIGNIFICANT CHANGE UP (ref 67–88)
SAO2 % BLDV: 82 % — SIGNIFICANT CHANGE UP (ref 67–88)
SAO2 % BLDV: 82.4 % — SIGNIFICANT CHANGE UP (ref 67–88)
SAO2 % BLDV: 82.4 % — SIGNIFICANT CHANGE UP (ref 67–88)
SAO2 % BLDV: 83.6 % — SIGNIFICANT CHANGE UP (ref 67–88)
SAO2 % BLDV: 84.5 % — SIGNIFICANT CHANGE UP (ref 67–88)
SAO2 % BLDV: 84.5 % — SIGNIFICANT CHANGE UP (ref 67–88)
SAO2 % BLDV: 85 % — SIGNIFICANT CHANGE UP (ref 67–88)
SAO2 % BLDV: 85 % — SIGNIFICANT CHANGE UP (ref 67–88)
SAO2 % BLDV: 87.3 % — SIGNIFICANT CHANGE UP (ref 67–88)
SAO2 % BLDV: 88.6 % — HIGH (ref 67–88)
SAO2 % BLDV: 88.7 % — HIGH (ref 67–88)
SAO2 % BLDV: 90.6 % — HIGH (ref 67–88)
SAO2 % BLDV: 92.7 % — HIGH (ref 67–88)
SAO2 % BLDV: 96.2 % — HIGH (ref 67–88)
SAO2 % BLDV: 96.2 % — HIGH (ref 67–88)
SAO2 % BLDV: 97.1 % — HIGH (ref 67–88)
SAO2 % BLDV: 97.3 % — HIGH (ref 67–88)
SAO2 % BLDV: 99 % — HIGH (ref 67–88)
SARS-COV-2 RNA SPEC QL NAA+PROBE: SIGNIFICANT CHANGE UP
SCHISTOCYTES BLD QL AUTO: SLIGHT — SIGNIFICANT CHANGE UP
SMUDGE CELLS # BLD: PRESENT — SIGNIFICANT CHANGE UP
SODIUM SERPL-SCNC: 121 MMOL/L — LOW (ref 135–145)
SODIUM SERPL-SCNC: 122 MMOL/L — LOW (ref 135–145)
SODIUM SERPL-SCNC: 123 MMOL/L — LOW (ref 135–145)
SODIUM SERPL-SCNC: 124 MMOL/L — LOW (ref 135–145)
SODIUM SERPL-SCNC: 124 MMOL/L — LOW (ref 135–145)
SODIUM SERPL-SCNC: 125 MMOL/L — LOW (ref 135–145)
SODIUM SERPL-SCNC: 126 MMOL/L — LOW (ref 135–145)
SODIUM SERPL-SCNC: 127 MMOL/L — LOW (ref 135–145)
SODIUM SERPL-SCNC: 128 MMOL/L — LOW (ref 135–145)
SODIUM SERPL-SCNC: 129 MMOL/L — LOW (ref 135–145)
SODIUM SERPL-SCNC: 130 MMOL/L — LOW (ref 135–145)
SODIUM SERPL-SCNC: 131 MMOL/L — LOW (ref 135–145)
SODIUM SERPL-SCNC: 132 MMOL/L — LOW (ref 135–145)
SODIUM SERPL-SCNC: 133 MMOL/L — LOW (ref 135–145)
SODIUM SERPL-SCNC: 134 MMOL/L — LOW (ref 135–145)
SODIUM SERPL-SCNC: 135 MMOL/L — SIGNIFICANT CHANGE UP (ref 135–145)
SODIUM SERPL-SCNC: 136 MMOL/L — SIGNIFICANT CHANGE UP (ref 135–145)
SODIUM SERPL-SCNC: 137 MMOL/L — SIGNIFICANT CHANGE UP (ref 135–145)
SODIUM SERPL-SCNC: 138 MMOL/L — SIGNIFICANT CHANGE UP (ref 135–145)
SODIUM SERPL-SCNC: 139 MMOL/L — SIGNIFICANT CHANGE UP (ref 135–145)
SODIUM SERPL-SCNC: 139 MMOL/L — SIGNIFICANT CHANGE UP (ref 135–145)
SODIUM SERPL-SCNC: 140 MMOL/L — SIGNIFICANT CHANGE UP (ref 135–145)
SODIUM SERPL-SCNC: 141 MMOL/L — SIGNIFICANT CHANGE UP (ref 135–145)
SODIUM UR-SCNC: 13 MMOL/L — SIGNIFICANT CHANGE UP
SODIUM UR-SCNC: 40 MMOL/L — SIGNIFICANT CHANGE UP
SP GR SPEC: 1.01 — SIGNIFICANT CHANGE UP (ref 1–1.03)
SP GR SPEC: 1.02 — SIGNIFICANT CHANGE UP (ref 1.01–1.02)
SP GR SPEC: 1.02 — SIGNIFICANT CHANGE UP (ref 1–1.03)
SPECIMEN SOURCE FLD: SIGNIFICANT CHANGE UP
SPECIMEN SOURCE FLD: SIGNIFICANT CHANGE UP
SPECIMEN SOURCE: SIGNIFICANT CHANGE UP
SQUAMOUS # UR AUTO: 1 /HPF — SIGNIFICANT CHANGE UP (ref 0–5)
SQUAMOUS # UR AUTO: 13 /HPF — HIGH (ref 0–5)
SQUAMOUS # UR AUTO: 2 /HPF — SIGNIFICANT CHANGE UP (ref 0–5)
SQUAMOUS # UR AUTO: 2 /HPF — SIGNIFICANT CHANGE UP (ref 0–5)
SQUAMOUS # UR AUTO: 4 /HPF — SIGNIFICANT CHANGE UP (ref 0–5)
SQUAMOUS # UR AUTO: 6 /HPF — HIGH (ref 0–5)
STRONGYLOIDES AB SER-ACNC: NEGATIVE — SIGNIFICANT CHANGE UP
STRONGYLOIDES AB SER-ACNC: NEGATIVE — SIGNIFICANT CHANGE UP
TIBC SERPL-MCNC: 141 UG/DL — LOW (ref 220–430)
TIBC SERPL-MCNC: 141 UG/DL — LOW (ref 220–430)
TIBC SERPL-MCNC: 183 UG/DL — LOW (ref 220–430)
TIBC SERPL-MCNC: 213 UG/DL — LOW (ref 220–430)
TOTAL CELLS COUNTED, BODY FLUID: 100 CELLS — SIGNIFICANT CHANGE UP
TOTAL NUCLEATED CELL COUNT, BODY FLUID: 754 CELLS/UL — HIGH (ref 0–5)
TOTAL NUCLEATED CELL COUNT, BODY FLUID: 754 CELLS/UL — HIGH (ref 0–5)
TOTAL NUCLEATED CELL COUNT, BODY FLUID: SIGNIFICANT CHANGE UP CELLS/UL (ref 0–5)
TRANSFUSION REACTION INTERP 1: SIGNIFICANT CHANGE UP
TRANSFUSION REACTION INTERP 2: SIGNIFICANT CHANGE UP
TRIGL SERPL-MCNC: 156 MG/DL — HIGH
TROPONIN T, HIGH SENSITIVITY RESULT: 56 NG/L — HIGH (ref 0–51)
TROPONIN T, HIGH SENSITIVITY RESULT: 78 NG/L — CRITICAL HIGH
TROPONIN T, HIGH SENSITIVITY RESULT: 83 NG/L — CRITICAL HIGH
TSH SERPL-MCNC: 0.71 UIU/ML — SIGNIFICANT CHANGE UP (ref 0.27–4.2)
TUBE TYPE: SIGNIFICANT CHANGE UP
UFH PPP CHRO-ACNC: 0.14 IU/ML — LOW (ref 0.3–0.7)
UIBC SERPL-MCNC: 129 UG/DL — SIGNIFICANT CHANGE UP (ref 110–370)
UIBC SERPL-MCNC: 129 UG/DL — SIGNIFICANT CHANGE UP (ref 110–370)
UIBC SERPL-MCNC: 170 UG/DL — SIGNIFICANT CHANGE UP (ref 110–370)
UIBC SERPL-MCNC: 191 UG/DL — SIGNIFICANT CHANGE UP (ref 110–370)
UROBILINOGEN FLD QL: 0.2 MG/DL — SIGNIFICANT CHANGE UP (ref 0.2–1)
UROBILINOGEN FLD QL: 1 MG/DL — SIGNIFICANT CHANGE UP (ref 0.2–1)
UROBILINOGEN FLD QL: NEGATIVE — SIGNIFICANT CHANGE UP
VANCOMYCIN FLD-MCNC: 10.2 UG/ML — SIGNIFICANT CHANGE UP
VANCOMYCIN FLD-MCNC: 12.6 UG/ML — SIGNIFICANT CHANGE UP
VANCOMYCIN FLD-MCNC: 12.7 UG/ML — SIGNIFICANT CHANGE UP
VANCOMYCIN FLD-MCNC: 15.1 UG/ML — SIGNIFICANT CHANGE UP
VANCOMYCIN FLD-MCNC: 15.3 UG/ML — SIGNIFICANT CHANGE UP
VANCOMYCIN FLD-MCNC: 17.6 UG/ML — SIGNIFICANT CHANGE UP
VANCOMYCIN FLD-MCNC: 18 UG/ML — SIGNIFICANT CHANGE UP
VANCOMYCIN FLD-MCNC: 19.2 UG/ML — SIGNIFICANT CHANGE UP
VANCOMYCIN FLD-MCNC: 20.1 UG/ML — SIGNIFICANT CHANGE UP
VANCOMYCIN FLD-MCNC: 20.3 UG/ML — SIGNIFICANT CHANGE UP
VANCOMYCIN FLD-MCNC: 20.6 UG/ML — SIGNIFICANT CHANGE UP
VANCOMYCIN FLD-MCNC: 20.7 UG/ML — SIGNIFICANT CHANGE UP
VANCOMYCIN FLD-MCNC: 21.5 UG/ML — SIGNIFICANT CHANGE UP
VANCOMYCIN FLD-MCNC: 22.4 UG/ML — SIGNIFICANT CHANGE UP
VANCOMYCIN FLD-MCNC: 23.4 UG/ML — SIGNIFICANT CHANGE UP
VANCOMYCIN FLD-MCNC: 7.1 UG/ML — SIGNIFICANT CHANGE UP
VANCOMYCIN FLD-MCNC: 7.1 UG/ML — SIGNIFICANT CHANGE UP
VANCOMYCIN FLD-MCNC: 9.6 UG/ML — SIGNIFICANT CHANGE UP
VANCOMYCIN TROUGH SERPL-MCNC: 15.1 UG/ML — SIGNIFICANT CHANGE UP (ref 10–20)
VANCOMYCIN TROUGH SERPL-MCNC: 16.7 UG/ML — SIGNIFICANT CHANGE UP (ref 10–20)
VANCOMYCIN TROUGH SERPL-MCNC: 18.4 UG/ML — SIGNIFICANT CHANGE UP (ref 10–20)
VANCOMYCIN TROUGH SERPL-MCNC: 24.2 UG/ML — HIGH (ref 10–20)
VARIANT LYMPHS # BLD: 0.9 % — SIGNIFICANT CHANGE UP (ref 0–6)
VARIANT LYMPHS # BLD: 0.9 % — SIGNIFICANT CHANGE UP (ref 0–6)
VARIANT LYMPHS # BLD: 1.7 % — SIGNIFICANT CHANGE UP (ref 0–6)
VARIANT LYMPHS # BLD: 2.7 % — SIGNIFICANT CHANGE UP (ref 0–6)
VIRUS SPEC CULT: SIGNIFICANT CHANGE UP
VIT B12 SERPL-MCNC: 1296 PG/ML — HIGH (ref 200–900)
VIT B12 SERPL-MCNC: 1296 PG/ML — HIGH (ref 200–900)
VIT B12 SERPL-MCNC: 904 PG/ML — HIGH (ref 200–900)
WBC # BLD: 10.15 K/UL — SIGNIFICANT CHANGE UP (ref 3.8–10.5)
WBC # BLD: 10.34 K/UL — SIGNIFICANT CHANGE UP (ref 3.8–10.5)
WBC # BLD: 10.85 K/UL — HIGH (ref 3.8–10.5)
WBC # BLD: 10.9 K/UL — HIGH (ref 3.8–10.5)
WBC # BLD: 10.91 K/UL — HIGH (ref 3.8–10.5)
WBC # BLD: 10.96 K/UL — HIGH (ref 3.8–10.5)
WBC # BLD: 10.99 K/UL — HIGH (ref 3.8–10.5)
WBC # BLD: 11.17 K/UL — HIGH (ref 3.8–10.5)
WBC # BLD: 11.22 K/UL — HIGH (ref 3.8–10.5)
WBC # BLD: 11.27 K/UL — HIGH (ref 3.8–10.5)
WBC # BLD: 11.29 K/UL — HIGH (ref 3.8–10.5)
WBC # BLD: 11.31 K/UL — HIGH (ref 3.8–10.5)
WBC # BLD: 11.41 K/UL — HIGH (ref 3.8–10.5)
WBC # BLD: 11.56 K/UL — HIGH (ref 3.8–10.5)
WBC # BLD: 11.68 K/UL — HIGH (ref 3.8–10.5)
WBC # BLD: 11.69 K/UL — HIGH (ref 3.8–10.5)
WBC # BLD: 11.8 K/UL — HIGH (ref 3.8–10.5)
WBC # BLD: 11.8 K/UL — HIGH (ref 3.8–10.5)
WBC # BLD: 11.81 K/UL — HIGH (ref 3.8–10.5)
WBC # BLD: 11.95 K/UL — HIGH (ref 3.8–10.5)
WBC # BLD: 12.02 K/UL — HIGH (ref 3.8–10.5)
WBC # BLD: 12.02 K/UL — HIGH (ref 3.8–10.5)
WBC # BLD: 12.19 K/UL — HIGH (ref 3.8–10.5)
WBC # BLD: 12.19 K/UL — HIGH (ref 3.8–10.5)
WBC # BLD: 12.2 K/UL — HIGH (ref 3.8–10.5)
WBC # BLD: 12.2 K/UL — HIGH (ref 3.8–10.5)
WBC # BLD: 12.22 K/UL — HIGH (ref 3.8–10.5)
WBC # BLD: 12.34 K/UL — HIGH (ref 3.8–10.5)
WBC # BLD: 12.34 K/UL — HIGH (ref 3.8–10.5)
WBC # BLD: 12.38 K/UL — HIGH (ref 3.8–10.5)
WBC # BLD: 12.38 K/UL — HIGH (ref 3.8–10.5)
WBC # BLD: 12.44 K/UL — HIGH (ref 3.8–10.5)
WBC # BLD: 12.58 K/UL — HIGH (ref 3.8–10.5)
WBC # BLD: 12.95 K/UL — HIGH (ref 3.8–10.5)
WBC # BLD: 12.99 K/UL — HIGH (ref 3.8–10.5)
WBC # BLD: 13.01 K/UL — HIGH (ref 3.8–10.5)
WBC # BLD: 13.03 K/UL — HIGH (ref 3.8–10.5)
WBC # BLD: 13.03 K/UL — HIGH (ref 3.8–10.5)
WBC # BLD: 13.05 K/UL — HIGH (ref 3.8–10.5)
WBC # BLD: 13.05 K/UL — HIGH (ref 3.8–10.5)
WBC # BLD: 13.06 K/UL — HIGH (ref 3.8–10.5)
WBC # BLD: 13.19 K/UL — HIGH (ref 3.8–10.5)
WBC # BLD: 13.19 K/UL — HIGH (ref 3.8–10.5)
WBC # BLD: 13.37 K/UL — HIGH (ref 3.8–10.5)
WBC # BLD: 13.37 K/UL — HIGH (ref 3.8–10.5)
WBC # BLD: 13.54 K/UL — HIGH (ref 3.8–10.5)
WBC # BLD: 13.56 K/UL — HIGH (ref 3.8–10.5)
WBC # BLD: 13.56 K/UL — HIGH (ref 3.8–10.5)
WBC # BLD: 13.59 K/UL — HIGH (ref 3.8–10.5)
WBC # BLD: 13.59 K/UL — HIGH (ref 3.8–10.5)
WBC # BLD: 13.69 K/UL — HIGH (ref 3.8–10.5)
WBC # BLD: 13.69 K/UL — HIGH (ref 3.8–10.5)
WBC # BLD: 13.71 K/UL — HIGH (ref 3.8–10.5)
WBC # BLD: 13.71 K/UL — HIGH (ref 3.8–10.5)
WBC # BLD: 13.81 K/UL — HIGH (ref 3.8–10.5)
WBC # BLD: 13.88 K/UL — HIGH (ref 3.8–10.5)
WBC # BLD: 13.89 K/UL — HIGH (ref 3.8–10.5)
WBC # BLD: 13.89 K/UL — HIGH (ref 3.8–10.5)
WBC # BLD: 13.95 K/UL — HIGH (ref 3.8–10.5)
WBC # BLD: 14 K/UL — HIGH (ref 3.8–10.5)
WBC # BLD: 14.04 K/UL — HIGH (ref 3.8–10.5)
WBC # BLD: 14.04 K/UL — HIGH (ref 3.8–10.5)
WBC # BLD: 14.07 K/UL — HIGH (ref 3.8–10.5)
WBC # BLD: 14.11 K/UL — HIGH (ref 3.8–10.5)
WBC # BLD: 14.17 K/UL — HIGH (ref 3.8–10.5)
WBC # BLD: 14.17 K/UL — HIGH (ref 3.8–10.5)
WBC # BLD: 14.21 K/UL — HIGH (ref 3.8–10.5)
WBC # BLD: 14.26 K/UL — HIGH (ref 3.8–10.5)
WBC # BLD: 14.26 K/UL — HIGH (ref 3.8–10.5)
WBC # BLD: 14.41 K/UL — HIGH (ref 3.8–10.5)
WBC # BLD: 14.53 K/UL — HIGH (ref 3.8–10.5)
WBC # BLD: 14.53 K/UL — HIGH (ref 3.8–10.5)
WBC # BLD: 14.78 K/UL — HIGH (ref 3.8–10.5)
WBC # BLD: 14.8 K/UL — HIGH (ref 3.8–10.5)
WBC # BLD: 14.89 K/UL — HIGH (ref 3.8–10.5)
WBC # BLD: 14.93 K/UL — HIGH (ref 3.8–10.5)
WBC # BLD: 14.96 K/UL — HIGH (ref 3.8–10.5)
WBC # BLD: 15.11 K/UL — HIGH (ref 3.8–10.5)
WBC # BLD: 15.11 K/UL — HIGH (ref 3.8–10.5)
WBC # BLD: 15.27 K/UL — HIGH (ref 3.8–10.5)
WBC # BLD: 15.27 K/UL — HIGH (ref 3.8–10.5)
WBC # BLD: 15.3 K/UL — HIGH (ref 3.8–10.5)
WBC # BLD: 15.3 K/UL — HIGH (ref 3.8–10.5)
WBC # BLD: 15.31 K/UL — HIGH (ref 3.8–10.5)
WBC # BLD: 15.46 K/UL — HIGH (ref 3.8–10.5)
WBC # BLD: 15.46 K/UL — HIGH (ref 3.8–10.5)
WBC # BLD: 15.49 K/UL — HIGH (ref 3.8–10.5)
WBC # BLD: 15.49 K/UL — HIGH (ref 3.8–10.5)
WBC # BLD: 15.61 K/UL — HIGH (ref 3.8–10.5)
WBC # BLD: 15.61 K/UL — HIGH (ref 3.8–10.5)
WBC # BLD: 15.73 K/UL — HIGH (ref 3.8–10.5)
WBC # BLD: 15.73 K/UL — HIGH (ref 3.8–10.5)
WBC # BLD: 15.83 K/UL — HIGH (ref 3.8–10.5)
WBC # BLD: 15.95 K/UL — HIGH (ref 3.8–10.5)
WBC # BLD: 15.95 K/UL — HIGH (ref 3.8–10.5)
WBC # BLD: 15.97 K/UL — HIGH (ref 3.8–10.5)
WBC # BLD: 15.97 K/UL — HIGH (ref 3.8–10.5)
WBC # BLD: 16.05 K/UL — HIGH (ref 3.8–10.5)
WBC # BLD: 16.05 K/UL — HIGH (ref 3.8–10.5)
WBC # BLD: 16.2 K/UL — HIGH (ref 3.8–10.5)
WBC # BLD: 16.25 K/UL — HIGH (ref 3.8–10.5)
WBC # BLD: 16.35 K/UL — HIGH (ref 3.8–10.5)
WBC # BLD: 16.35 K/UL — HIGH (ref 3.8–10.5)
WBC # BLD: 16.42 K/UL — HIGH (ref 3.8–10.5)
WBC # BLD: 16.68 K/UL — HIGH (ref 3.8–10.5)
WBC # BLD: 16.68 K/UL — HIGH (ref 3.8–10.5)
WBC # BLD: 16.76 K/UL — HIGH (ref 3.8–10.5)
WBC # BLD: 16.96 K/UL — HIGH (ref 3.8–10.5)
WBC # BLD: 16.97 K/UL — HIGH (ref 3.8–10.5)
WBC # BLD: 17.34 K/UL — HIGH (ref 3.8–10.5)
WBC # BLD: 17.34 K/UL — HIGH (ref 3.8–10.5)
WBC # BLD: 17.62 K/UL — HIGH (ref 3.8–10.5)
WBC # BLD: 17.62 K/UL — HIGH (ref 3.8–10.5)
WBC # BLD: 17.89 K/UL — HIGH (ref 3.8–10.5)
WBC # BLD: 17.94 K/UL — HIGH (ref 3.8–10.5)
WBC # BLD: 18.02 K/UL — HIGH (ref 3.8–10.5)
WBC # BLD: 18.02 K/UL — HIGH (ref 3.8–10.5)
WBC # BLD: 18.49 K/UL — HIGH (ref 3.8–10.5)
WBC # BLD: 18.49 K/UL — HIGH (ref 3.8–10.5)
WBC # BLD: 18.57 K/UL — HIGH (ref 3.8–10.5)
WBC # BLD: 18.73 K/UL — HIGH (ref 3.8–10.5)
WBC # BLD: 18.82 K/UL — HIGH (ref 3.8–10.5)
WBC # BLD: 19.27 K/UL — HIGH (ref 3.8–10.5)
WBC # BLD: 19.27 K/UL — HIGH (ref 3.8–10.5)
WBC # BLD: 19.33 K/UL — HIGH (ref 3.8–10.5)
WBC # BLD: 19.33 K/UL — HIGH (ref 3.8–10.5)
WBC # BLD: 20.41 K/UL — HIGH (ref 3.8–10.5)
WBC # BLD: 20.41 K/UL — HIGH (ref 3.8–10.5)
WBC # BLD: 21.64 K/UL — HIGH (ref 3.8–10.5)
WBC # BLD: 21.64 K/UL — HIGH (ref 3.8–10.5)
WBC # BLD: 25.53 K/UL — HIGH (ref 3.8–10.5)
WBC # BLD: 25.53 K/UL — HIGH (ref 3.8–10.5)
WBC # BLD: 27.48 K/UL — HIGH (ref 3.8–10.5)
WBC # BLD: 27.48 K/UL — HIGH (ref 3.8–10.5)
WBC # BLD: 29.11 K/UL — HIGH (ref 3.8–10.5)
WBC # BLD: 29.11 K/UL — HIGH (ref 3.8–10.5)
WBC # BLD: 30.48 K/UL — HIGH (ref 3.8–10.5)
WBC # BLD: 30.48 K/UL — HIGH (ref 3.8–10.5)
WBC # BLD: 32.28 K/UL — HIGH (ref 3.8–10.5)
WBC # BLD: 32.28 K/UL — HIGH (ref 3.8–10.5)
WBC # BLD: 34.19 K/UL — HIGH (ref 3.8–10.5)
WBC # BLD: 34.19 K/UL — HIGH (ref 3.8–10.5)
WBC # BLD: 4.15 K/UL — SIGNIFICANT CHANGE UP (ref 3.8–10.5)
WBC # BLD: 4.28 K/UL — SIGNIFICANT CHANGE UP (ref 3.8–10.5)
WBC # BLD: 4.33 K/UL — SIGNIFICANT CHANGE UP (ref 3.8–10.5)
WBC # BLD: 4.64 K/UL — SIGNIFICANT CHANGE UP (ref 3.8–10.5)
WBC # BLD: 4.66 K/UL — SIGNIFICANT CHANGE UP (ref 3.8–10.5)
WBC # BLD: 40.06 K/UL — CRITICAL HIGH (ref 3.8–10.5)
WBC # BLD: 40.06 K/UL — CRITICAL HIGH (ref 3.8–10.5)
WBC # BLD: 5.02 K/UL — SIGNIFICANT CHANGE UP (ref 3.8–10.5)
WBC # BLD: 5.1 K/UL — SIGNIFICANT CHANGE UP (ref 3.8–10.5)
WBC # BLD: 5.1 K/UL — SIGNIFICANT CHANGE UP (ref 3.8–10.5)
WBC # BLD: 5.17 K/UL — SIGNIFICANT CHANGE UP (ref 3.8–10.5)
WBC # BLD: 5.4 K/UL — SIGNIFICANT CHANGE UP (ref 3.8–10.5)
WBC # BLD: 5.41 K/UL — SIGNIFICANT CHANGE UP (ref 3.8–10.5)
WBC # BLD: 5.75 K/UL — SIGNIFICANT CHANGE UP (ref 3.8–10.5)
WBC # BLD: 5.95 K/UL — SIGNIFICANT CHANGE UP (ref 3.8–10.5)
WBC # BLD: 6.16 K/UL — SIGNIFICANT CHANGE UP (ref 3.8–10.5)
WBC # BLD: 6.31 K/UL — SIGNIFICANT CHANGE UP (ref 3.8–10.5)
WBC # BLD: 6.34 K/UL — SIGNIFICANT CHANGE UP (ref 3.8–10.5)
WBC # BLD: 6.47 K/UL — SIGNIFICANT CHANGE UP (ref 3.8–10.5)
WBC # BLD: 6.8 K/UL — SIGNIFICANT CHANGE UP (ref 3.8–10.5)
WBC # BLD: 7.01 K/UL — SIGNIFICANT CHANGE UP (ref 3.8–10.5)
WBC # BLD: 7.07 K/UL — SIGNIFICANT CHANGE UP (ref 3.8–10.5)
WBC # BLD: 7.19 K/UL — SIGNIFICANT CHANGE UP (ref 3.8–10.5)
WBC # BLD: 7.43 K/UL — SIGNIFICANT CHANGE UP (ref 3.8–10.5)
WBC # BLD: 7.64 K/UL — SIGNIFICANT CHANGE UP (ref 3.8–10.5)
WBC # BLD: 7.78 K/UL — SIGNIFICANT CHANGE UP (ref 3.8–10.5)
WBC # BLD: 7.88 K/UL — SIGNIFICANT CHANGE UP (ref 3.8–10.5)
WBC # BLD: 8.3 K/UL — SIGNIFICANT CHANGE UP (ref 3.8–10.5)
WBC # BLD: 8.51 K/UL — SIGNIFICANT CHANGE UP (ref 3.8–10.5)
WBC # BLD: 8.52 K/UL — SIGNIFICANT CHANGE UP (ref 3.8–10.5)
WBC # BLD: 8.52 K/UL — SIGNIFICANT CHANGE UP (ref 3.8–10.5)
WBC # BLD: 8.53 K/UL — SIGNIFICANT CHANGE UP (ref 3.8–10.5)
WBC # BLD: 8.56 K/UL — SIGNIFICANT CHANGE UP (ref 3.8–10.5)
WBC # BLD: 8.76 K/UL — SIGNIFICANT CHANGE UP (ref 3.8–10.5)
WBC # BLD: 8.91 K/UL — SIGNIFICANT CHANGE UP (ref 3.8–10.5)
WBC # BLD: 8.96 K/UL — SIGNIFICANT CHANGE UP (ref 3.8–10.5)
WBC # BLD: 8.99 K/UL — SIGNIFICANT CHANGE UP (ref 3.8–10.5)
WBC # BLD: 9.02 K/UL — SIGNIFICANT CHANGE UP (ref 3.8–10.5)
WBC # BLD: 9.05 K/UL — SIGNIFICANT CHANGE UP (ref 3.8–10.5)
WBC # BLD: 9.13 K/UL — SIGNIFICANT CHANGE UP (ref 3.8–10.5)
WBC # BLD: 9.17 K/UL — SIGNIFICANT CHANGE UP (ref 3.8–10.5)
WBC # BLD: 9.4 K/UL — SIGNIFICANT CHANGE UP (ref 3.8–10.5)
WBC # BLD: 9.46 K/UL — SIGNIFICANT CHANGE UP (ref 3.8–10.5)
WBC # BLD: 9.57 K/UL — SIGNIFICANT CHANGE UP (ref 3.8–10.5)
WBC # BLD: 9.57 K/UL — SIGNIFICANT CHANGE UP (ref 3.8–10.5)
WBC # BLD: 9.63 K/UL — SIGNIFICANT CHANGE UP (ref 3.8–10.5)
WBC # BLD: 9.68 K/UL — SIGNIFICANT CHANGE UP (ref 3.8–10.5)
WBC # BLD: 9.99 K/UL — SIGNIFICANT CHANGE UP (ref 3.8–10.5)
WBC # FLD AUTO: 10.15 K/UL — SIGNIFICANT CHANGE UP (ref 3.8–10.5)
WBC # FLD AUTO: 10.34 K/UL — SIGNIFICANT CHANGE UP (ref 3.8–10.5)
WBC # FLD AUTO: 10.85 K/UL — HIGH (ref 3.8–10.5)
WBC # FLD AUTO: 10.9 K/UL — HIGH (ref 3.8–10.5)
WBC # FLD AUTO: 10.91 K/UL — HIGH (ref 3.8–10.5)
WBC # FLD AUTO: 10.96 K/UL — HIGH (ref 3.8–10.5)
WBC # FLD AUTO: 10.99 K/UL — HIGH (ref 3.8–10.5)
WBC # FLD AUTO: 11.17 K/UL — HIGH (ref 3.8–10.5)
WBC # FLD AUTO: 11.22 K/UL — HIGH (ref 3.8–10.5)
WBC # FLD AUTO: 11.27 K/UL — HIGH (ref 3.8–10.5)
WBC # FLD AUTO: 11.29 K/UL — HIGH (ref 3.8–10.5)
WBC # FLD AUTO: 11.31 K/UL — HIGH (ref 3.8–10.5)
WBC # FLD AUTO: 11.41 K/UL — HIGH (ref 3.8–10.5)
WBC # FLD AUTO: 11.56 K/UL — HIGH (ref 3.8–10.5)
WBC # FLD AUTO: 11.68 K/UL — HIGH (ref 3.8–10.5)
WBC # FLD AUTO: 11.69 K/UL — HIGH (ref 3.8–10.5)
WBC # FLD AUTO: 11.8 K/UL — HIGH (ref 3.8–10.5)
WBC # FLD AUTO: 11.8 K/UL — HIGH (ref 3.8–10.5)
WBC # FLD AUTO: 11.81 K/UL — HIGH (ref 3.8–10.5)
WBC # FLD AUTO: 11.95 K/UL — HIGH (ref 3.8–10.5)
WBC # FLD AUTO: 12.02 K/UL — HIGH (ref 3.8–10.5)
WBC # FLD AUTO: 12.02 K/UL — HIGH (ref 3.8–10.5)
WBC # FLD AUTO: 12.19 K/UL — HIGH (ref 3.8–10.5)
WBC # FLD AUTO: 12.19 K/UL — HIGH (ref 3.8–10.5)
WBC # FLD AUTO: 12.2 K/UL — HIGH (ref 3.8–10.5)
WBC # FLD AUTO: 12.2 K/UL — HIGH (ref 3.8–10.5)
WBC # FLD AUTO: 12.22 K/UL — HIGH (ref 3.8–10.5)
WBC # FLD AUTO: 12.34 K/UL — HIGH (ref 3.8–10.5)
WBC # FLD AUTO: 12.34 K/UL — HIGH (ref 3.8–10.5)
WBC # FLD AUTO: 12.38 K/UL — HIGH (ref 3.8–10.5)
WBC # FLD AUTO: 12.38 K/UL — HIGH (ref 3.8–10.5)
WBC # FLD AUTO: 12.44 K/UL — HIGH (ref 3.8–10.5)
WBC # FLD AUTO: 12.58 K/UL — HIGH (ref 3.8–10.5)
WBC # FLD AUTO: 12.95 K/UL — HIGH (ref 3.8–10.5)
WBC # FLD AUTO: 12.99 K/UL — HIGH (ref 3.8–10.5)
WBC # FLD AUTO: 13.01 K/UL — HIGH (ref 3.8–10.5)
WBC # FLD AUTO: 13.03 K/UL — HIGH (ref 3.8–10.5)
WBC # FLD AUTO: 13.03 K/UL — HIGH (ref 3.8–10.5)
WBC # FLD AUTO: 13.05 K/UL — HIGH (ref 3.8–10.5)
WBC # FLD AUTO: 13.05 K/UL — HIGH (ref 3.8–10.5)
WBC # FLD AUTO: 13.06 K/UL — HIGH (ref 3.8–10.5)
WBC # FLD AUTO: 13.19 K/UL — HIGH (ref 3.8–10.5)
WBC # FLD AUTO: 13.19 K/UL — HIGH (ref 3.8–10.5)
WBC # FLD AUTO: 13.37 K/UL — HIGH (ref 3.8–10.5)
WBC # FLD AUTO: 13.37 K/UL — HIGH (ref 3.8–10.5)
WBC # FLD AUTO: 13.54 K/UL — HIGH (ref 3.8–10.5)
WBC # FLD AUTO: 13.56 K/UL — HIGH (ref 3.8–10.5)
WBC # FLD AUTO: 13.56 K/UL — HIGH (ref 3.8–10.5)
WBC # FLD AUTO: 13.59 K/UL — HIGH (ref 3.8–10.5)
WBC # FLD AUTO: 13.59 K/UL — HIGH (ref 3.8–10.5)
WBC # FLD AUTO: 13.69 K/UL — HIGH (ref 3.8–10.5)
WBC # FLD AUTO: 13.69 K/UL — HIGH (ref 3.8–10.5)
WBC # FLD AUTO: 13.71 K/UL — HIGH (ref 3.8–10.5)
WBC # FLD AUTO: 13.71 K/UL — HIGH (ref 3.8–10.5)
WBC # FLD AUTO: 13.81 K/UL — HIGH (ref 3.8–10.5)
WBC # FLD AUTO: 13.88 K/UL — HIGH (ref 3.8–10.5)
WBC # FLD AUTO: 13.89 K/UL — HIGH (ref 3.8–10.5)
WBC # FLD AUTO: 13.89 K/UL — HIGH (ref 3.8–10.5)
WBC # FLD AUTO: 13.95 K/UL — HIGH (ref 3.8–10.5)
WBC # FLD AUTO: 14 K/UL — HIGH (ref 3.8–10.5)
WBC # FLD AUTO: 14.04 K/UL — HIGH (ref 3.8–10.5)
WBC # FLD AUTO: 14.04 K/UL — HIGH (ref 3.8–10.5)
WBC # FLD AUTO: 14.07 K/UL — HIGH (ref 3.8–10.5)
WBC # FLD AUTO: 14.11 K/UL — HIGH (ref 3.8–10.5)
WBC # FLD AUTO: 14.17 K/UL — HIGH (ref 3.8–10.5)
WBC # FLD AUTO: 14.17 K/UL — HIGH (ref 3.8–10.5)
WBC # FLD AUTO: 14.21 K/UL — HIGH (ref 3.8–10.5)
WBC # FLD AUTO: 14.26 K/UL — HIGH (ref 3.8–10.5)
WBC # FLD AUTO: 14.26 K/UL — HIGH (ref 3.8–10.5)
WBC # FLD AUTO: 14.41 K/UL — HIGH (ref 3.8–10.5)
WBC # FLD AUTO: 14.53 K/UL — HIGH (ref 3.8–10.5)
WBC # FLD AUTO: 14.53 K/UL — HIGH (ref 3.8–10.5)
WBC # FLD AUTO: 14.78 K/UL — HIGH (ref 3.8–10.5)
WBC # FLD AUTO: 14.8 K/UL — HIGH (ref 3.8–10.5)
WBC # FLD AUTO: 14.89 K/UL — HIGH (ref 3.8–10.5)
WBC # FLD AUTO: 14.93 K/UL — HIGH (ref 3.8–10.5)
WBC # FLD AUTO: 14.96 K/UL — HIGH (ref 3.8–10.5)
WBC # FLD AUTO: 15.11 K/UL — HIGH (ref 3.8–10.5)
WBC # FLD AUTO: 15.11 K/UL — HIGH (ref 3.8–10.5)
WBC # FLD AUTO: 15.27 K/UL — HIGH (ref 3.8–10.5)
WBC # FLD AUTO: 15.27 K/UL — HIGH (ref 3.8–10.5)
WBC # FLD AUTO: 15.3 K/UL — HIGH (ref 3.8–10.5)
WBC # FLD AUTO: 15.3 K/UL — HIGH (ref 3.8–10.5)
WBC # FLD AUTO: 15.31 K/UL — HIGH (ref 3.8–10.5)
WBC # FLD AUTO: 15.46 K/UL — HIGH (ref 3.8–10.5)
WBC # FLD AUTO: 15.46 K/UL — HIGH (ref 3.8–10.5)
WBC # FLD AUTO: 15.49 K/UL — HIGH (ref 3.8–10.5)
WBC # FLD AUTO: 15.49 K/UL — HIGH (ref 3.8–10.5)
WBC # FLD AUTO: 15.61 K/UL — HIGH (ref 3.8–10.5)
WBC # FLD AUTO: 15.61 K/UL — HIGH (ref 3.8–10.5)
WBC # FLD AUTO: 15.73 K/UL — HIGH (ref 3.8–10.5)
WBC # FLD AUTO: 15.73 K/UL — HIGH (ref 3.8–10.5)
WBC # FLD AUTO: 15.83 K/UL — HIGH (ref 3.8–10.5)
WBC # FLD AUTO: 15.95 K/UL — HIGH (ref 3.8–10.5)
WBC # FLD AUTO: 15.95 K/UL — HIGH (ref 3.8–10.5)
WBC # FLD AUTO: 15.97 K/UL — HIGH (ref 3.8–10.5)
WBC # FLD AUTO: 15.97 K/UL — HIGH (ref 3.8–10.5)
WBC # FLD AUTO: 16.05 K/UL — HIGH (ref 3.8–10.5)
WBC # FLD AUTO: 16.05 K/UL — HIGH (ref 3.8–10.5)
WBC # FLD AUTO: 16.2 K/UL — HIGH (ref 3.8–10.5)
WBC # FLD AUTO: 16.25 K/UL — HIGH (ref 3.8–10.5)
WBC # FLD AUTO: 16.35 K/UL — HIGH (ref 3.8–10.5)
WBC # FLD AUTO: 16.35 K/UL — HIGH (ref 3.8–10.5)
WBC # FLD AUTO: 16.42 K/UL — HIGH (ref 3.8–10.5)
WBC # FLD AUTO: 16.68 K/UL — HIGH (ref 3.8–10.5)
WBC # FLD AUTO: 16.68 K/UL — HIGH (ref 3.8–10.5)
WBC # FLD AUTO: 16.76 K/UL — HIGH (ref 3.8–10.5)
WBC # FLD AUTO: 16.96 K/UL — HIGH (ref 3.8–10.5)
WBC # FLD AUTO: 16.97 K/UL — HIGH (ref 3.8–10.5)
WBC # FLD AUTO: 17.34 K/UL — HIGH (ref 3.8–10.5)
WBC # FLD AUTO: 17.34 K/UL — HIGH (ref 3.8–10.5)
WBC # FLD AUTO: 17.62 K/UL — HIGH (ref 3.8–10.5)
WBC # FLD AUTO: 17.62 K/UL — HIGH (ref 3.8–10.5)
WBC # FLD AUTO: 17.89 K/UL — HIGH (ref 3.8–10.5)
WBC # FLD AUTO: 17.94 K/UL — HIGH (ref 3.8–10.5)
WBC # FLD AUTO: 18.02 K/UL — HIGH (ref 3.8–10.5)
WBC # FLD AUTO: 18.02 K/UL — HIGH (ref 3.8–10.5)
WBC # FLD AUTO: 18.49 K/UL — HIGH (ref 3.8–10.5)
WBC # FLD AUTO: 18.49 K/UL — HIGH (ref 3.8–10.5)
WBC # FLD AUTO: 18.57 K/UL — HIGH (ref 3.8–10.5)
WBC # FLD AUTO: 18.73 K/UL — HIGH (ref 3.8–10.5)
WBC # FLD AUTO: 18.82 K/UL — HIGH (ref 3.8–10.5)
WBC # FLD AUTO: 19.27 K/UL — HIGH (ref 3.8–10.5)
WBC # FLD AUTO: 19.27 K/UL — HIGH (ref 3.8–10.5)
WBC # FLD AUTO: 19.33 K/UL — HIGH (ref 3.8–10.5)
WBC # FLD AUTO: 19.33 K/UL — HIGH (ref 3.8–10.5)
WBC # FLD AUTO: 20.41 K/UL — HIGH (ref 3.8–10.5)
WBC # FLD AUTO: 20.41 K/UL — HIGH (ref 3.8–10.5)
WBC # FLD AUTO: 21.64 K/UL — HIGH (ref 3.8–10.5)
WBC # FLD AUTO: 21.64 K/UL — HIGH (ref 3.8–10.5)
WBC # FLD AUTO: 25.53 K/UL — HIGH (ref 3.8–10.5)
WBC # FLD AUTO: 25.53 K/UL — HIGH (ref 3.8–10.5)
WBC # FLD AUTO: 27.48 K/UL — HIGH (ref 3.8–10.5)
WBC # FLD AUTO: 27.48 K/UL — HIGH (ref 3.8–10.5)
WBC # FLD AUTO: 29.11 K/UL — HIGH (ref 3.8–10.5)
WBC # FLD AUTO: 29.11 K/UL — HIGH (ref 3.8–10.5)
WBC # FLD AUTO: 30.48 K/UL — HIGH (ref 3.8–10.5)
WBC # FLD AUTO: 30.48 K/UL — HIGH (ref 3.8–10.5)
WBC # FLD AUTO: 32.28 K/UL — HIGH (ref 3.8–10.5)
WBC # FLD AUTO: 32.28 K/UL — HIGH (ref 3.8–10.5)
WBC # FLD AUTO: 34.19 K/UL — HIGH (ref 3.8–10.5)
WBC # FLD AUTO: 34.19 K/UL — HIGH (ref 3.8–10.5)
WBC # FLD AUTO: 4.15 K/UL — SIGNIFICANT CHANGE UP (ref 3.8–10.5)
WBC # FLD AUTO: 4.28 K/UL — SIGNIFICANT CHANGE UP (ref 3.8–10.5)
WBC # FLD AUTO: 4.33 K/UL — SIGNIFICANT CHANGE UP (ref 3.8–10.5)
WBC # FLD AUTO: 4.64 K/UL — SIGNIFICANT CHANGE UP (ref 3.8–10.5)
WBC # FLD AUTO: 4.66 K/UL — SIGNIFICANT CHANGE UP (ref 3.8–10.5)
WBC # FLD AUTO: 40.06 K/UL — CRITICAL HIGH (ref 3.8–10.5)
WBC # FLD AUTO: 40.06 K/UL — CRITICAL HIGH (ref 3.8–10.5)
WBC # FLD AUTO: 5.02 K/UL — SIGNIFICANT CHANGE UP (ref 3.8–10.5)
WBC # FLD AUTO: 5.1 K/UL — SIGNIFICANT CHANGE UP (ref 3.8–10.5)
WBC # FLD AUTO: 5.1 K/UL — SIGNIFICANT CHANGE UP (ref 3.8–10.5)
WBC # FLD AUTO: 5.17 K/UL — SIGNIFICANT CHANGE UP (ref 3.8–10.5)
WBC # FLD AUTO: 5.4 K/UL — SIGNIFICANT CHANGE UP (ref 3.8–10.5)
WBC # FLD AUTO: 5.41 K/UL — SIGNIFICANT CHANGE UP (ref 3.8–10.5)
WBC # FLD AUTO: 5.75 K/UL — SIGNIFICANT CHANGE UP (ref 3.8–10.5)
WBC # FLD AUTO: 5.95 K/UL — SIGNIFICANT CHANGE UP (ref 3.8–10.5)
WBC # FLD AUTO: 6.16 K/UL — SIGNIFICANT CHANGE UP (ref 3.8–10.5)
WBC # FLD AUTO: 6.31 K/UL — SIGNIFICANT CHANGE UP (ref 3.8–10.5)
WBC # FLD AUTO: 6.34 K/UL — SIGNIFICANT CHANGE UP (ref 3.8–10.5)
WBC # FLD AUTO: 6.47 K/UL — SIGNIFICANT CHANGE UP (ref 3.8–10.5)
WBC # FLD AUTO: 6.8 K/UL — SIGNIFICANT CHANGE UP (ref 3.8–10.5)
WBC # FLD AUTO: 7.01 K/UL — SIGNIFICANT CHANGE UP (ref 3.8–10.5)
WBC # FLD AUTO: 7.07 K/UL — SIGNIFICANT CHANGE UP (ref 3.8–10.5)
WBC # FLD AUTO: 7.19 K/UL — SIGNIFICANT CHANGE UP (ref 3.8–10.5)
WBC # FLD AUTO: 7.43 K/UL — SIGNIFICANT CHANGE UP (ref 3.8–10.5)
WBC # FLD AUTO: 7.64 K/UL — SIGNIFICANT CHANGE UP (ref 3.8–10.5)
WBC # FLD AUTO: 7.78 K/UL — SIGNIFICANT CHANGE UP (ref 3.8–10.5)
WBC # FLD AUTO: 7.88 K/UL — SIGNIFICANT CHANGE UP (ref 3.8–10.5)
WBC # FLD AUTO: 8.3 K/UL — SIGNIFICANT CHANGE UP (ref 3.8–10.5)
WBC # FLD AUTO: 8.51 K/UL — SIGNIFICANT CHANGE UP (ref 3.8–10.5)
WBC # FLD AUTO: 8.52 K/UL — SIGNIFICANT CHANGE UP (ref 3.8–10.5)
WBC # FLD AUTO: 8.52 K/UL — SIGNIFICANT CHANGE UP (ref 3.8–10.5)
WBC # FLD AUTO: 8.53 K/UL — SIGNIFICANT CHANGE UP (ref 3.8–10.5)
WBC # FLD AUTO: 8.56 K/UL — SIGNIFICANT CHANGE UP (ref 3.8–10.5)
WBC # FLD AUTO: 8.76 K/UL — SIGNIFICANT CHANGE UP (ref 3.8–10.5)
WBC # FLD AUTO: 8.91 K/UL — SIGNIFICANT CHANGE UP (ref 3.8–10.5)
WBC # FLD AUTO: 8.96 K/UL — SIGNIFICANT CHANGE UP (ref 3.8–10.5)
WBC # FLD AUTO: 8.99 K/UL — SIGNIFICANT CHANGE UP (ref 3.8–10.5)
WBC # FLD AUTO: 9.02 K/UL — SIGNIFICANT CHANGE UP (ref 3.8–10.5)
WBC # FLD AUTO: 9.05 K/UL — SIGNIFICANT CHANGE UP (ref 3.8–10.5)
WBC # FLD AUTO: 9.13 K/UL — SIGNIFICANT CHANGE UP (ref 3.8–10.5)
WBC # FLD AUTO: 9.17 K/UL — SIGNIFICANT CHANGE UP (ref 3.8–10.5)
WBC # FLD AUTO: 9.4 K/UL — SIGNIFICANT CHANGE UP (ref 3.8–10.5)
WBC # FLD AUTO: 9.46 K/UL — SIGNIFICANT CHANGE UP (ref 3.8–10.5)
WBC # FLD AUTO: 9.57 K/UL — SIGNIFICANT CHANGE UP (ref 3.8–10.5)
WBC # FLD AUTO: 9.57 K/UL — SIGNIFICANT CHANGE UP (ref 3.8–10.5)
WBC # FLD AUTO: 9.63 K/UL — SIGNIFICANT CHANGE UP (ref 3.8–10.5)
WBC # FLD AUTO: 9.68 K/UL — SIGNIFICANT CHANGE UP (ref 3.8–10.5)
WBC # FLD AUTO: 9.99 K/UL — SIGNIFICANT CHANGE UP (ref 3.8–10.5)
WBC UR QL: 15 /HPF — HIGH (ref 0–5)
WBC UR QL: 2 /HPF — SIGNIFICANT CHANGE UP (ref 0–5)
WBC UR QL: 240 /HPF — HIGH (ref 0–5)
WBC UR QL: 2489 /HPF — HIGH (ref 0–5)
WBC UR QL: 28 /HPF — HIGH (ref 0–5)
WBC UR QL: 41 /HPF — HIGH (ref 0–5)
WBC UR QL: 55 /HPF — HIGH (ref 0–5)
WBC UR QL: 7 /HPF — HIGH (ref 0–5)
YEAST-LIKE CELLS: PRESENT

## 2023-01-01 PROCEDURE — 70450 CT HEAD/BRAIN W/O DYE: CPT | Mod: 26

## 2023-01-01 PROCEDURE — 99291 CRITICAL CARE FIRST HOUR: CPT | Mod: 25

## 2023-01-01 PROCEDURE — 99233 SBSQ HOSP IP/OBS HIGH 50: CPT | Mod: FS

## 2023-01-01 PROCEDURE — 85014 HEMATOCRIT: CPT

## 2023-01-01 PROCEDURE — 93321 DOPPLER ECHO F-UP/LMTD STD: CPT | Mod: 26

## 2023-01-01 PROCEDURE — 93306 TTE W/DOPPLER COMPLETE: CPT | Mod: 26

## 2023-01-01 PROCEDURE — 95720 EEG PHY/QHP EA INCR W/VEEG: CPT

## 2023-01-01 PROCEDURE — 99222 1ST HOSP IP/OBS MODERATE 55: CPT | Mod: GC

## 2023-01-01 PROCEDURE — 99291 CRITICAL CARE FIRST HOUR: CPT

## 2023-01-01 PROCEDURE — 80048 BASIC METABOLIC PNL TOTAL CA: CPT

## 2023-01-01 PROCEDURE — 85027 COMPLETE CBC AUTOMATED: CPT

## 2023-01-01 PROCEDURE — 70450 CT HEAD/BRAIN W/O DYE: CPT

## 2023-01-01 PROCEDURE — 36600 WITHDRAWAL OF ARTERIAL BLOOD: CPT

## 2023-01-01 PROCEDURE — 84295 ASSAY OF SERUM SODIUM: CPT

## 2023-01-01 PROCEDURE — 99285 EMERGENCY DEPT VISIT HI MDM: CPT | Mod: FS

## 2023-01-01 PROCEDURE — 71250 CT THORAX DX C-: CPT | Mod: 26

## 2023-01-01 PROCEDURE — 93971 EXTREMITY STUDY: CPT | Mod: 26

## 2023-01-01 PROCEDURE — 99213 OFFICE O/P EST LOW 20 MIN: CPT | Mod: 95

## 2023-01-01 PROCEDURE — 99233 SBSQ HOSP IP/OBS HIGH 50: CPT

## 2023-01-01 PROCEDURE — 71045 X-RAY EXAM CHEST 1 VIEW: CPT | Mod: 26

## 2023-01-01 PROCEDURE — 99497 ADVNCD CARE PLAN 30 MIN: CPT | Mod: 25

## 2023-01-01 PROCEDURE — 74018 RADEX ABDOMEN 1 VIEW: CPT | Mod: 26

## 2023-01-01 PROCEDURE — 93308 TTE F-UP OR LMTD: CPT | Mod: 26

## 2023-01-01 PROCEDURE — 99232 SBSQ HOSP IP/OBS MODERATE 35: CPT | Mod: FS

## 2023-01-01 PROCEDURE — 31624 DX BRONCHOSCOPE/LAVAGE: CPT

## 2023-01-01 PROCEDURE — 73564 X-RAY EXAM KNEE 4 OR MORE: CPT

## 2023-01-01 PROCEDURE — 85018 HEMOGLOBIN: CPT

## 2023-01-01 PROCEDURE — 99232 SBSQ HOSP IP/OBS MODERATE 35: CPT

## 2023-01-01 PROCEDURE — 93291 INTERROG DEV EVAL SCRMS IP: CPT | Mod: 26

## 2023-01-01 PROCEDURE — 83735 ASSAY OF MAGNESIUM: CPT

## 2023-01-01 PROCEDURE — 84132 ASSAY OF SERUM POTASSIUM: CPT

## 2023-01-01 PROCEDURE — 84100 ASSAY OF PHOSPHORUS: CPT

## 2023-01-01 PROCEDURE — 36800 INSERTION OF CANNULA: CPT

## 2023-01-01 PROCEDURE — 45378 DIAGNOSTIC COLONOSCOPY: CPT | Mod: GC

## 2023-01-01 PROCEDURE — 99232 SBSQ HOSP IP/OBS MODERATE 35: CPT | Mod: GC

## 2023-01-01 PROCEDURE — 76604 US EXAM CHEST: CPT | Mod: 26,GC

## 2023-01-01 PROCEDURE — 99285 EMERGENCY DEPT VISIT HI MDM: CPT | Mod: 25

## 2023-01-01 PROCEDURE — 93308 TTE F-UP OR LMTD: CPT | Mod: 26,GC

## 2023-01-01 PROCEDURE — 82803 BLOOD GASES ANY COMBINATION: CPT

## 2023-01-01 PROCEDURE — 99231 SBSQ HOSP IP/OBS SF/LOW 25: CPT

## 2023-01-01 PROCEDURE — 43761 REPOSITION GASTROSTOMY TUBE: CPT

## 2023-01-01 PROCEDURE — 74176 CT ABD & PELVIS W/O CONTRAST: CPT | Mod: 26

## 2023-01-01 PROCEDURE — 99232 SBSQ HOSP IP/OBS MODERATE 35: CPT | Mod: GC,25

## 2023-01-01 PROCEDURE — 74230 X-RAY XM SWLNG FUNCJ C+: CPT | Mod: 26

## 2023-01-01 PROCEDURE — 87015 SPECIMEN INFECT AGNT CONCNTJ: CPT

## 2023-01-01 PROCEDURE — 80053 COMPREHEN METABOLIC PANEL: CPT

## 2023-01-01 PROCEDURE — 43752 NASAL/OROGASTRIC W/TUBE PLMT: CPT | Mod: 59

## 2023-01-01 PROCEDURE — 99223 1ST HOSP IP/OBS HIGH 75: CPT

## 2023-01-01 PROCEDURE — 99233 SBSQ HOSP IP/OBS HIGH 50: CPT | Mod: FS,25

## 2023-01-01 PROCEDURE — 36415 COLL VENOUS BLD VENIPUNCTURE: CPT

## 2023-01-01 PROCEDURE — 31600 PLANNED TRACHEOSTOMY: CPT | Mod: GC

## 2023-01-01 PROCEDURE — 99349 HOME/RES VST EST MOD MDM 40: CPT

## 2023-01-01 PROCEDURE — 99292 CRITICAL CARE ADDL 30 MIN: CPT | Mod: 25

## 2023-01-01 PROCEDURE — 32551 INSERTION OF CHEST TUBE: CPT

## 2023-01-01 PROCEDURE — 70450 CT HEAD/BRAIN W/O DYE: CPT | Mod: 26,77

## 2023-01-01 PROCEDURE — 43235 EGD DIAGNOSTIC BRUSH WASH: CPT | Mod: GC,59

## 2023-01-01 PROCEDURE — 99233 SBSQ HOSP IP/OBS HIGH 50: CPT | Mod: GC

## 2023-01-01 PROCEDURE — 71045 X-RAY EXAM CHEST 1 VIEW: CPT | Mod: 26,76

## 2023-01-01 PROCEDURE — 82947 ASSAY GLUCOSE BLOOD QUANT: CPT

## 2023-01-01 PROCEDURE — 92610 EVALUATE SWALLOWING FUNCTION: CPT

## 2023-01-01 PROCEDURE — 0225U NFCT DS DNA&RNA 21 SARSCOV2: CPT

## 2023-01-01 PROCEDURE — 84484 ASSAY OF TROPONIN QUANT: CPT

## 2023-01-01 PROCEDURE — 83880 ASSAY OF NATRIURETIC PEPTIDE: CPT

## 2023-01-01 PROCEDURE — 36800 INSERTION OF CANNULA: CPT | Mod: GC

## 2023-01-01 PROCEDURE — 36556 INSERT NON-TUNNEL CV CATH: CPT | Mod: GC

## 2023-01-01 PROCEDURE — 83605 ASSAY OF LACTIC ACID: CPT

## 2023-01-01 PROCEDURE — 82570 ASSAY OF URINE CREATININE: CPT

## 2023-01-01 PROCEDURE — 69210 REMOVE IMPACTED EAR WAX UNI: CPT

## 2023-01-01 PROCEDURE — 76937 US GUIDE VASCULAR ACCESS: CPT | Mod: 26

## 2023-01-01 PROCEDURE — 83550 IRON BINDING TEST: CPT

## 2023-01-01 PROCEDURE — 71045 X-RAY EXAM CHEST 1 VIEW: CPT | Mod: 26,77

## 2023-01-01 PROCEDURE — 99223 1ST HOSP IP/OBS HIGH 75: CPT | Mod: FS,25

## 2023-01-01 PROCEDURE — 36620 INSERTION CATHETER ARTERY: CPT | Mod: GC

## 2023-01-01 PROCEDURE — 76536 US EXAM OF HEAD AND NECK: CPT | Mod: 26,GC

## 2023-01-01 PROCEDURE — 81001 URINALYSIS AUTO W/SCOPE: CPT

## 2023-01-01 PROCEDURE — 93306 TTE W/DOPPLER COMPLETE: CPT

## 2023-01-01 PROCEDURE — 82728 ASSAY OF FERRITIN: CPT

## 2023-01-01 PROCEDURE — 92526 ORAL FUNCTION THERAPY: CPT

## 2023-01-01 PROCEDURE — 31645 BRNCHSC W/THER ASPIR 1ST: CPT

## 2023-01-01 PROCEDURE — 95718 EEG PHYS/QHP 2-12 HR W/VEEG: CPT

## 2023-01-01 PROCEDURE — 73564 X-RAY EXAM KNEE 4 OR MORE: CPT | Mod: 26,LT

## 2023-01-01 PROCEDURE — 83036 HEMOGLOBIN GLYCOSYLATED A1C: CPT

## 2023-01-01 PROCEDURE — 87641 MR-STAPH DNA AMP PROBE: CPT

## 2023-01-01 PROCEDURE — 87040 BLOOD CULTURE FOR BACTERIA: CPT

## 2023-01-01 PROCEDURE — 94660 CPAP INITIATION&MGMT: CPT

## 2023-01-01 PROCEDURE — 76604 US EXAM CHEST: CPT | Mod: 26

## 2023-01-01 PROCEDURE — 99231 SBSQ HOSP IP/OBS SF/LOW 25: CPT | Mod: GC

## 2023-01-01 PROCEDURE — 84133 ASSAY OF URINE POTASSIUM: CPT

## 2023-01-01 PROCEDURE — 76770 US EXAM ABDO BACK WALL COMP: CPT | Mod: 26

## 2023-01-01 PROCEDURE — 99498 ADVNCD CARE PLAN ADDL 30 MIN: CPT

## 2023-01-01 PROCEDURE — 99204 OFFICE O/P NEW MOD 45 MIN: CPT | Mod: 95

## 2023-01-01 PROCEDURE — 84300 ASSAY OF URINE SODIUM: CPT

## 2023-01-01 PROCEDURE — 99497 ADVNCD CARE PLAN 30 MIN: CPT

## 2023-01-01 PROCEDURE — 93970 EXTREMITY STUDY: CPT | Mod: 26

## 2023-01-01 PROCEDURE — 93312 ECHO TRANSESOPHAGEAL: CPT | Mod: 26,GC

## 2023-01-01 PROCEDURE — 36000 PLACE NEEDLE IN VEIN: CPT | Mod: 59

## 2023-01-01 PROCEDURE — 82962 GLUCOSE BLOOD TEST: CPT

## 2023-01-01 PROCEDURE — 83540 ASSAY OF IRON: CPT

## 2023-01-01 PROCEDURE — 83935 ASSAY OF URINE OSMOLALITY: CPT

## 2023-01-01 PROCEDURE — 85025 COMPLETE CBC W/AUTO DIFF WBC: CPT

## 2023-01-01 PROCEDURE — 36000 PLACE NEEDLE IN VEIN: CPT

## 2023-01-01 PROCEDURE — 86078 PHYS BLOOD BANK SERV REACTJ: CPT

## 2023-01-01 PROCEDURE — 87116 MYCOBACTERIA CULTURE: CPT

## 2023-01-01 PROCEDURE — 87206 SMEAR FLUORESCENT/ACID STAI: CPT

## 2023-01-01 PROCEDURE — 82435 ASSAY OF BLOOD CHLORIDE: CPT

## 2023-01-01 PROCEDURE — 31502 CHANGE OF WINDPIPE AIRWAY: CPT

## 2023-01-01 PROCEDURE — 71250 CT THORAX DX C-: CPT | Mod: 26,MA

## 2023-01-01 PROCEDURE — 92611 MOTION FLUOROSCOPY/SWALLOW: CPT

## 2023-01-01 PROCEDURE — 99291 CRITICAL CARE FIRST HOUR: CPT | Mod: 25,GC

## 2023-01-01 PROCEDURE — 71250 CT THORAX DX C-: CPT | Mod: MA

## 2023-01-01 PROCEDURE — 70480 CT ORBIT/EAR/FOSSA W/O DYE: CPT | Mod: 26

## 2023-01-01 PROCEDURE — 74230 X-RAY XM SWLNG FUNCJ C+: CPT

## 2023-01-01 PROCEDURE — 99223 1ST HOSP IP/OBS HIGH 75: CPT | Mod: GC

## 2023-01-01 PROCEDURE — 97162 PT EVAL MOD COMPLEX 30 MIN: CPT

## 2023-01-01 PROCEDURE — 99291 CRITICAL CARE FIRST HOUR: CPT | Mod: FS,25

## 2023-01-01 PROCEDURE — 94640 AIRWAY INHALATION TREATMENT: CPT

## 2023-01-01 PROCEDURE — 82330 ASSAY OF CALCIUM: CPT

## 2023-01-01 PROCEDURE — 71045 X-RAY EXAM CHEST 1 VIEW: CPT

## 2023-01-01 PROCEDURE — 70551 MRI BRAIN STEM W/O DYE: CPT | Mod: 26

## 2023-01-01 PROCEDURE — 31500 INSERT EMERGENCY AIRWAY: CPT

## 2023-01-01 PROCEDURE — 76775 US EXAM ABDO BACK WALL LIM: CPT | Mod: 26,GC

## 2023-01-01 PROCEDURE — 87640 STAPH A DNA AMP PROBE: CPT

## 2023-01-01 PROCEDURE — 31646 BRNCHSC W/THER ASPIR SBSQ: CPT

## 2023-01-01 PROCEDURE — 76770 US EXAM ABDO BACK WALL COMP: CPT

## 2023-01-01 RX ORDER — MIDODRINE HYDROCHLORIDE 2.5 MG/1
40 TABLET ORAL ONCE
Refills: 0 | Status: COMPLETED | OUTPATIENT
Start: 2023-01-01 | End: 2023-01-01

## 2023-01-01 RX ORDER — MIDODRINE HYDROCHLORIDE 2.5 MG/1
40 TABLET ORAL
Refills: 0 | Status: DISCONTINUED | OUTPATIENT
Start: 2023-01-01 | End: 2023-01-01

## 2023-01-01 RX ORDER — PHENYLEPHRINE HYDROCHLORIDE 10 MG/ML
0.1 INJECTION INTRAVENOUS
Qty: 40 | Refills: 0 | Status: DISCONTINUED | OUTPATIENT
Start: 2023-01-01 | End: 2023-01-01

## 2023-01-01 RX ORDER — MIDODRINE HYDROCHLORIDE 2.5 MG/1
20 TABLET ORAL ONCE
Refills: 0 | Status: COMPLETED | OUTPATIENT
Start: 2023-01-01 | End: 2023-01-01

## 2023-01-01 RX ORDER — ALBUTEROL 90 UG/1
2.5 AEROSOL, METERED ORAL EVERY 6 HOURS
Refills: 0 | Status: DISCONTINUED | OUTPATIENT
Start: 2023-01-01 | End: 2023-01-01

## 2023-01-01 RX ORDER — LABETALOL HCL 100 MG
10 TABLET ORAL ONCE
Refills: 0 | Status: COMPLETED | OUTPATIENT
Start: 2023-01-01 | End: 2023-01-01

## 2023-01-01 RX ORDER — LABETALOL HCL 100 MG
200 TABLET ORAL THREE TIMES A DAY
Refills: 0 | Status: DISCONTINUED | OUTPATIENT
Start: 2023-01-01 | End: 2023-01-01

## 2023-01-01 RX ORDER — MEROPENEM 1 G/30ML
500 INJECTION INTRAVENOUS EVERY 12 HOURS
Refills: 0 | Status: DISCONTINUED | OUTPATIENT
Start: 2023-01-01 | End: 2023-01-01

## 2023-01-01 RX ORDER — CEFEPIME 1 G/1
INJECTION, POWDER, FOR SOLUTION INTRAMUSCULAR; INTRAVENOUS
Refills: 0 | Status: DISCONTINUED | OUTPATIENT
Start: 2023-01-01 | End: 2023-01-01

## 2023-01-01 RX ORDER — PROPOFOL 10 MG/ML
50 INJECTION, EMULSION INTRAVENOUS
Qty: 1000 | Refills: 0 | Status: DISCONTINUED | OUTPATIENT
Start: 2023-01-01 | End: 2023-01-01

## 2023-01-01 RX ORDER — SODIUM CHLORIDE 9 MG/ML
1000 INJECTION, SOLUTION INTRAVENOUS
Refills: 0 | Status: DISCONTINUED | OUTPATIENT
Start: 2023-01-01 | End: 2023-01-01

## 2023-01-01 RX ORDER — NOREPINEPHRINE BITARTRATE/D5W 8 MG/250ML
0.05 PLASTIC BAG, INJECTION (ML) INTRAVENOUS
Qty: 8 | Refills: 0 | Status: DISCONTINUED | OUTPATIENT
Start: 2023-01-01 | End: 2023-01-01

## 2023-01-01 RX ORDER — DOXAZOSIN MESYLATE 4 MG
8 TABLET ORAL AT BEDTIME
Refills: 0 | Status: DISCONTINUED | OUTPATIENT
Start: 2023-01-01 | End: 2023-01-01

## 2023-01-01 RX ORDER — ERYTHROPOIETIN 10000 [IU]/ML
10000 INJECTION, SOLUTION INTRAVENOUS; SUBCUTANEOUS
Refills: 0 | Status: DISCONTINUED | OUTPATIENT
Start: 2023-01-01 | End: 2023-01-01

## 2023-01-01 RX ORDER — IPRATROPIUM BROMIDE 42 UG/1
0.06 SPRAY NASAL
Qty: 45 | Refills: 0 | Status: ACTIVE | COMMUNITY
Start: 2022-07-27

## 2023-01-01 RX ORDER — HUMAN INSULIN 100 [IU]/ML
3 INJECTION, SUSPENSION SUBCUTANEOUS EVERY 6 HOURS
Refills: 0 | Status: DISCONTINUED | OUTPATIENT
Start: 2023-01-01 | End: 2023-01-01

## 2023-01-01 RX ORDER — POTASSIUM CHLORIDE 20 MEQ
10 PACKET (EA) ORAL
Refills: 0 | Status: COMPLETED | OUTPATIENT
Start: 2023-01-01 | End: 2023-01-01

## 2023-01-01 RX ORDER — NOREPINEPHRINE BITARTRATE/D5W 8 MG/250ML
0.08 PLASTIC BAG, INJECTION (ML) INTRAVENOUS
Qty: 16 | Refills: 0 | Status: DISCONTINUED | OUTPATIENT
Start: 2023-01-01 | End: 2023-01-01

## 2023-01-01 RX ORDER — ROCURONIUM BROMIDE 10 MG/ML
100 VIAL (ML) INTRAVENOUS ONCE
Refills: 0 | Status: COMPLETED | OUTPATIENT
Start: 2023-01-01 | End: 2023-01-01

## 2023-01-01 RX ORDER — HEPARIN SODIUM 5000 [USP'U]/ML
5000 INJECTION INTRAVENOUS; SUBCUTANEOUS EVERY 8 HOURS
Refills: 0 | Status: COMPLETED | OUTPATIENT
Start: 2023-01-01 | End: 2023-01-01

## 2023-01-01 RX ORDER — BUMETANIDE 0.25 MG/ML
2 INJECTION INTRAMUSCULAR; INTRAVENOUS
Qty: 20 | Refills: 0 | Status: DISCONTINUED | OUTPATIENT
Start: 2023-01-01 | End: 2023-01-01

## 2023-01-01 RX ORDER — CIPROFLOXACIN/HYDROCORTISONE 0.2 %-1 %
4 SUSPENSION, DROPS(FINAL DOSAGE FORM)(ML) OTIC (EAR)
Refills: 0 | Status: DISCONTINUED | OUTPATIENT
Start: 2023-01-01 | End: 2023-01-01

## 2023-01-01 RX ORDER — NOREPINEPHRINE BITARTRATE/D5W 8 MG/250ML
0.05 PLASTIC BAG, INJECTION (ML) INTRAVENOUS
Qty: 16 | Refills: 0 | Status: DISCONTINUED | OUTPATIENT
Start: 2023-01-01 | End: 2023-01-01

## 2023-01-01 RX ORDER — FLUTICASONE PROPIONATE 50 MCG
1 SPRAY, SUSPENSION NASAL
Refills: 0 | Status: DISCONTINUED | OUTPATIENT
Start: 2023-01-01 | End: 2023-01-01

## 2023-01-01 RX ORDER — DEXMEDETOMIDINE HYDROCHLORIDE IN 0.9% SODIUM CHLORIDE 4 UG/ML
1 INJECTION INTRAVENOUS
Qty: 400 | Refills: 0 | Status: DISCONTINUED | OUTPATIENT
Start: 2023-01-01 | End: 2023-01-01

## 2023-01-01 RX ORDER — BUMETANIDE 0.25 MG/ML
2 INJECTION INTRAMUSCULAR; INTRAVENOUS
Refills: 0 | Status: DISCONTINUED | OUTPATIENT
Start: 2023-01-01 | End: 2023-01-01

## 2023-01-01 RX ORDER — SODIUM,POTASSIUM PHOSPHATES 278-250MG
1 POWDER IN PACKET (EA) ORAL EVERY 12 HOURS
Refills: 0 | Status: COMPLETED | OUTPATIENT
Start: 2023-01-01 | End: 2023-01-01

## 2023-01-01 RX ORDER — MIDODRINE HYDROCHLORIDE 2.5 MG/1
10 TABLET ORAL ONCE
Refills: 0 | Status: DISCONTINUED | OUTPATIENT
Start: 2023-01-01 | End: 2023-01-01

## 2023-01-01 RX ORDER — INSULIN GLARGINE 100 [IU]/ML
30 INJECTION, SOLUTION SUBCUTANEOUS AT BEDTIME
Refills: 0 | Status: DISCONTINUED | OUTPATIENT
Start: 2023-01-01 | End: 2023-01-01

## 2023-01-01 RX ORDER — CEFAZOLIN SODIUM 1 G
1000 VIAL (EA) INJECTION EVERY 12 HOURS
Refills: 0 | Status: DISCONTINUED | OUTPATIENT
Start: 2023-01-01 | End: 2023-01-01

## 2023-01-01 RX ORDER — HUMAN INSULIN 100 [IU]/ML
18 INJECTION, SUSPENSION SUBCUTANEOUS EVERY 6 HOURS
Refills: 0 | Status: DISCONTINUED | OUTPATIENT
Start: 2023-01-01 | End: 2023-01-01

## 2023-01-01 RX ORDER — VANCOMYCIN HCL 1 G
500 VIAL (EA) INTRAVENOUS ONCE
Refills: 0 | Status: COMPLETED | OUTPATIENT
Start: 2023-01-01 | End: 2023-01-01

## 2023-01-01 RX ORDER — DIPHENHYDRAMINE HCL 50 MG
50 CAPSULE ORAL ONCE
Refills: 0 | Status: COMPLETED | OUTPATIENT
Start: 2023-01-01 | End: 2023-01-01

## 2023-01-01 RX ORDER — PHENYLEPHRINE HYDROCHLORIDE 10 MG/ML
0.01 INJECTION INTRAVENOUS
Qty: 160 | Refills: 0 | Status: DISCONTINUED | OUTPATIENT
Start: 2023-01-01 | End: 2023-01-01

## 2023-01-01 RX ORDER — AZTREONAM 2 G
1000 VIAL (EA) INJECTION ONCE
Refills: 0 | Status: COMPLETED | OUTPATIENT
Start: 2023-01-01 | End: 2023-01-01

## 2023-01-01 RX ORDER — DIPHENHYDRAMINE HCL 50 MG
12.5 CAPSULE ORAL ONCE
Refills: 0 | Status: COMPLETED | OUTPATIENT
Start: 2023-01-01 | End: 2023-01-01

## 2023-01-01 RX ORDER — MIDAZOLAM HYDROCHLORIDE 1 MG/ML
2 INJECTION, SOLUTION INTRAMUSCULAR; INTRAVENOUS ONCE
Refills: 0 | Status: DISCONTINUED | OUTPATIENT
Start: 2023-01-01 | End: 2023-01-01

## 2023-01-01 RX ORDER — MIDODRINE HYDROCHLORIDE 2.5 MG/1
10 TABLET ORAL ONCE
Refills: 0 | Status: COMPLETED | OUTPATIENT
Start: 2023-01-01 | End: 2023-01-01

## 2023-01-01 RX ORDER — INSULIN LISPRO 100/ML
VIAL (ML) SUBCUTANEOUS
Refills: 0 | Status: DISCONTINUED | OUTPATIENT
Start: 2023-01-01 | End: 2023-01-01

## 2023-01-01 RX ORDER — HEPARIN SODIUM 5000 [USP'U]/ML
5000 INJECTION INTRAVENOUS; SUBCUTANEOUS EVERY 8 HOURS
Refills: 0 | Status: DISCONTINUED | OUTPATIENT
Start: 2023-01-01 | End: 2023-01-01

## 2023-01-01 RX ORDER — ETOMIDATE 2 MG/ML
20 INJECTION INTRAVENOUS ONCE
Refills: 0 | Status: COMPLETED | OUTPATIENT
Start: 2023-01-01 | End: 2023-01-01

## 2023-01-01 RX ORDER — ASPIRIN/CALCIUM CARB/MAGNESIUM 324 MG
81 TABLET ORAL DAILY
Refills: 0 | Status: DISCONTINUED | OUTPATIENT
Start: 2023-01-01 | End: 2023-01-01

## 2023-01-01 RX ORDER — LANOLIN ALCOHOL/MO/W.PET/CERES
6 CREAM (GRAM) TOPICAL ONCE
Refills: 0 | Status: COMPLETED | OUTPATIENT
Start: 2023-01-01 | End: 2023-01-01

## 2023-01-01 RX ORDER — FUROSEMIDE 40 MG
40 TABLET ORAL EVERY 12 HOURS
Refills: 0 | Status: DISCONTINUED | OUTPATIENT
Start: 2023-01-01 | End: 2023-01-01

## 2023-01-01 RX ORDER — HYDROCORTISONE 20 MG
50 TABLET ORAL EVERY 8 HOURS
Refills: 0 | Status: DISCONTINUED | OUTPATIENT
Start: 2023-01-01 | End: 2023-01-01

## 2023-01-01 RX ORDER — DEXMEDETOMIDINE HYDROCHLORIDE IN 0.9% SODIUM CHLORIDE 4 UG/ML
0.3 INJECTION INTRAVENOUS
Qty: 400 | Refills: 0 | Status: DISCONTINUED | OUTPATIENT
Start: 2023-01-01 | End: 2023-01-01

## 2023-01-01 RX ORDER — DONEPEZIL HYDROCHLORIDE 10 MG/1
10 TABLET, FILM COATED ORAL AT BEDTIME
Refills: 0 | Status: DISCONTINUED | OUTPATIENT
Start: 2023-01-01 | End: 2023-01-01

## 2023-01-01 RX ORDER — ALBUMIN HUMAN 25 %
200 VIAL (ML) INTRAVENOUS ONCE
Refills: 0 | Status: COMPLETED | OUTPATIENT
Start: 2023-01-01 | End: 2023-01-01

## 2023-01-01 RX ORDER — FENTANYL CITRATE 50 UG/ML
100 INJECTION INTRAVENOUS ONCE
Refills: 0 | Status: DISCONTINUED | OUTPATIENT
Start: 2023-01-01 | End: 2023-01-01

## 2023-01-01 RX ORDER — PROPOFOL 10 MG/ML
50.12 INJECTION, EMULSION INTRAVENOUS
Qty: 1000 | Refills: 0 | Status: DISCONTINUED | OUTPATIENT
Start: 2023-01-01 | End: 2023-01-01

## 2023-01-01 RX ORDER — ASPIRIN/CALCIUM CARB/MAGNESIUM 324 MG
81 TABLET ORAL ONCE
Refills: 0 | Status: COMPLETED | OUTPATIENT
Start: 2023-01-01 | End: 2023-01-01

## 2023-01-01 RX ORDER — AZTREONAM 2 G
2000 VIAL (EA) INJECTION
Refills: 0 | Status: DISCONTINUED | OUTPATIENT
Start: 2023-01-01 | End: 2023-01-01

## 2023-01-01 RX ORDER — CARVEDILOL PHOSPHATE 80 MG/1
12.5 CAPSULE, EXTENDED RELEASE ORAL EVERY 12 HOURS
Refills: 0 | Status: DISCONTINUED | OUTPATIENT
Start: 2023-01-01 | End: 2023-01-01

## 2023-01-01 RX ORDER — VANCOMYCIN HCL 1 G
1000 VIAL (EA) INTRAVENOUS ONCE
Refills: 0 | Status: COMPLETED | OUTPATIENT
Start: 2023-01-01 | End: 2023-01-01

## 2023-01-01 RX ORDER — MIDODRINE HYDROCHLORIDE 2.5 MG/1
30 TABLET ORAL EVERY 8 HOURS
Refills: 0 | Status: DISCONTINUED | OUTPATIENT
Start: 2023-01-01 | End: 2023-01-01

## 2023-01-01 RX ORDER — INSULIN LISPRO 100/ML
6 VIAL (ML) SUBCUTANEOUS
Refills: 0 | Status: DISCONTINUED | OUTPATIENT
Start: 2023-01-01 | End: 2023-01-01

## 2023-01-01 RX ORDER — VIT A/VIT C/VIT E/ZINC/COPPER 4296-226
CAPSULE ORAL
Qty: 90 | Refills: 0 | Status: DISCONTINUED | COMMUNITY
Start: 2020-12-23 | End: 2023-01-01

## 2023-01-01 RX ORDER — DOXAZOSIN 2 MG/1
2 TABLET ORAL
Qty: 90 | Refills: 0 | Status: DISCONTINUED | COMMUNITY
Start: 2018-11-15 | End: 2023-01-01

## 2023-01-01 RX ORDER — BUMETANIDE 0.25 MG/ML
2 INJECTION INTRAMUSCULAR; INTRAVENOUS ONCE
Refills: 0 | Status: COMPLETED | OUTPATIENT
Start: 2023-01-01 | End: 2023-01-01

## 2023-01-01 RX ORDER — ALTEPLASE 100 MG
2 KIT INTRAVENOUS ONCE
Refills: 0 | Status: COMPLETED | OUTPATIENT
Start: 2023-01-01 | End: 2023-01-01

## 2023-01-01 RX ORDER — INSULIN GLARGINE 100 [IU]/ML
18 INJECTION, SOLUTION SUBCUTANEOUS AT BEDTIME
Refills: 0 | Status: DISCONTINUED | OUTPATIENT
Start: 2023-01-01 | End: 2023-01-01

## 2023-01-01 RX ORDER — APIXABAN 2.5 MG/1
5 TABLET, FILM COATED ORAL
Refills: 0 | Status: DISCONTINUED | OUTPATIENT
Start: 2023-01-01 | End: 2023-01-01

## 2023-01-01 RX ORDER — CISATRACURIUM BESYLATE 2 MG/ML
20 INJECTION INTRAVENOUS ONCE
Refills: 0 | Status: COMPLETED | OUTPATIENT
Start: 2023-01-01 | End: 2023-01-01

## 2023-01-01 RX ORDER — ACETAMINOPHEN 500 MG
1000 TABLET ORAL ONCE
Refills: 0 | Status: COMPLETED | OUTPATIENT
Start: 2023-01-01 | End: 2023-01-01

## 2023-01-01 RX ORDER — POLYMYXIN B SULFATE 500000 [USP'U]/1
1500000 INJECTION, POWDER, LYOPHILIZED, FOR SOLUTION INTRAMUSCULAR; INTRATHECAL; INTRAVENOUS; OPHTHALMIC ONCE
Refills: 0 | Status: COMPLETED | OUTPATIENT
Start: 2023-01-01 | End: 2023-01-01

## 2023-01-01 RX ORDER — PANTOPRAZOLE SODIUM 20 MG/1
40 TABLET, DELAYED RELEASE ORAL
Refills: 0 | Status: DISCONTINUED | OUTPATIENT
Start: 2023-01-01 | End: 2023-01-01

## 2023-01-01 RX ORDER — LIRAGLUTIDE 6 MG/ML
1.2 INJECTION SUBCUTANEOUS
Refills: 0 | DISCHARGE

## 2023-01-01 RX ORDER — AZTREONAM 2 G
VIAL (EA) INJECTION
Refills: 0 | Status: DISCONTINUED | OUTPATIENT
Start: 2023-01-01 | End: 2023-01-01

## 2023-01-01 RX ORDER — DROXIDOPA 100 MG/1
200 CAPSULE ORAL THREE TIMES A DAY
Refills: 0 | Status: DISCONTINUED | OUTPATIENT
Start: 2023-01-01 | End: 2023-01-01

## 2023-01-01 RX ORDER — DROXIDOPA 100 MG/1
200 CAPSULE ORAL
Refills: 0 | Status: DISCONTINUED | OUTPATIENT
Start: 2023-01-01 | End: 2023-01-01

## 2023-01-01 RX ORDER — MIDODRINE HYDROCHLORIDE 2.5 MG/1
20 TABLET ORAL THREE TIMES A DAY
Refills: 0 | Status: DISCONTINUED | OUTPATIENT
Start: 2023-01-01 | End: 2023-01-01

## 2023-01-01 RX ORDER — DEXTROSE 50 % IN WATER 50 %
25 SYRINGE (ML) INTRAVENOUS ONCE
Refills: 0 | Status: DISCONTINUED | OUTPATIENT
Start: 2023-01-01 | End: 2023-01-01

## 2023-01-01 RX ORDER — MIDODRINE HYDROCHLORIDE 2.5 MG/1
30 TABLET ORAL ONCE
Refills: 0 | Status: COMPLETED | OUTPATIENT
Start: 2023-01-01 | End: 2023-01-01

## 2023-01-01 RX ORDER — MIDAZOLAM HYDROCHLORIDE 1 MG/ML
4 INJECTION, SOLUTION INTRAMUSCULAR; INTRAVENOUS ONCE
Refills: 0 | Status: DISCONTINUED | OUTPATIENT
Start: 2023-01-01 | End: 2023-01-01

## 2023-01-01 RX ORDER — HYDROXYZINE HCL 10 MG
25 TABLET ORAL EVERY 6 HOURS
Refills: 0 | Status: DISCONTINUED | OUTPATIENT
Start: 2023-01-01 | End: 2023-01-01

## 2023-01-01 RX ORDER — MODAFINIL 200 MG/1
200 TABLET ORAL
Refills: 0 | Status: DISCONTINUED | OUTPATIENT
Start: 2023-01-01 | End: 2023-01-01

## 2023-01-01 RX ORDER — DIPHENHYDRAMINE HYDROCHLORIDE AND LIDOCAINE HYDROCHLORIDE AND ALUMINUM HYDROXIDE AND MAGNESIUM HYDRO
10 KIT
Refills: 0 | Status: DISCONTINUED | OUTPATIENT
Start: 2023-01-01 | End: 2023-01-01

## 2023-01-01 RX ORDER — INSULIN HUMAN 100 [IU]/ML
5 INJECTION, SOLUTION SUBCUTANEOUS ONCE
Refills: 0 | Status: COMPLETED | OUTPATIENT
Start: 2023-01-01 | End: 2023-01-01

## 2023-01-01 RX ORDER — SEVELAMER CARBONATE 2400 MG/1
1600 POWDER, FOR SUSPENSION ORAL EVERY 8 HOURS
Refills: 0 | Status: DISCONTINUED | OUTPATIENT
Start: 2023-01-01 | End: 2023-01-01

## 2023-01-01 RX ORDER — FENTANYL CITRATE 50 UG/ML
0.5 INJECTION INTRAVENOUS
Qty: 2500 | Refills: 0 | Status: DISCONTINUED | OUTPATIENT
Start: 2023-01-01 | End: 2023-01-01

## 2023-01-01 RX ORDER — AZTREONAM 2 G
500 VIAL (EA) INJECTION EVERY 6 HOURS
Refills: 0 | Status: DISCONTINUED | OUTPATIENT
Start: 2023-01-01 | End: 2023-01-01

## 2023-01-01 RX ORDER — HUMAN INSULIN 100 [IU]/ML
1 INJECTION, SUSPENSION SUBCUTANEOUS EVERY 6 HOURS
Refills: 0 | Status: DISCONTINUED | OUTPATIENT
Start: 2023-01-01 | End: 2023-01-01

## 2023-01-01 RX ORDER — CLONIDINE HYDROCHLORIDE 0.2 MG/1
0.2 TABLET ORAL
Qty: 60 | Refills: 0 | Status: ACTIVE | COMMUNITY
Start: 2023-01-01

## 2023-01-01 RX ORDER — SODIUM ZIRCONIUM CYCLOSILICATE 10 G/10G
5 POWDER, FOR SUSPENSION ORAL ONCE
Refills: 0 | Status: COMPLETED | OUTPATIENT
Start: 2023-01-01 | End: 2023-01-01

## 2023-01-01 RX ORDER — DEXTROSE 50 % IN WATER 50 %
12.5 SYRINGE (ML) INTRAVENOUS ONCE
Refills: 0 | Status: DISCONTINUED | OUTPATIENT
Start: 2023-01-01 | End: 2023-01-01

## 2023-01-01 RX ORDER — FUROSEMIDE 40 MG
1 TABLET ORAL
Refills: 0 | DISCHARGE

## 2023-01-01 RX ORDER — HYDROCORTISONE 20 MG
25 TABLET ORAL DAILY
Refills: 0 | Status: COMPLETED | OUTPATIENT
Start: 2023-01-01 | End: 2023-01-01

## 2023-01-01 RX ORDER — CIPROFLOXACIN LACTATE 400MG/40ML
200 VIAL (ML) INTRAVENOUS EVERY 12 HOURS
Refills: 0 | Status: DISCONTINUED | OUTPATIENT
Start: 2023-01-01 | End: 2023-01-01

## 2023-01-01 RX ORDER — CARVEDILOL PHOSPHATE 80 MG/1
3 CAPSULE, EXTENDED RELEASE ORAL
Refills: 0 | DISCHARGE

## 2023-01-01 RX ORDER — FAMOTIDINE 10 MG/ML
20 INJECTION INTRAVENOUS ONCE
Refills: 0 | Status: COMPLETED | OUTPATIENT
Start: 2023-01-01 | End: 2023-01-01

## 2023-01-01 RX ORDER — PANTOPRAZOLE SODIUM 20 MG/1
1 TABLET, DELAYED RELEASE ORAL
Qty: 0 | Refills: 0 | DISCHARGE

## 2023-01-01 RX ORDER — CARVEDILOL PHOSPHATE 80 MG/1
6.25 CAPSULE, EXTENDED RELEASE ORAL EVERY 12 HOURS
Refills: 0 | Status: DISCONTINUED | OUTPATIENT
Start: 2023-01-01 | End: 2023-01-01

## 2023-01-01 RX ORDER — HEPARIN SODIUM 5000 [USP'U]/ML
INJECTION INTRAVENOUS; SUBCUTANEOUS
Qty: 25000 | Refills: 0 | Status: DISCONTINUED | OUTPATIENT
Start: 2023-01-01 | End: 2023-01-01

## 2023-01-01 RX ORDER — FERROUS SULFATE 325(65) MG
1 TABLET ORAL
Qty: 30 | Refills: 0
Start: 2023-01-01 | End: 2023-01-01

## 2023-01-01 RX ORDER — HUMAN INSULIN 100 [IU]/ML
11 INJECTION, SUSPENSION SUBCUTANEOUS EVERY 6 HOURS
Refills: 0 | Status: DISCONTINUED | OUTPATIENT
Start: 2023-01-01 | End: 2023-01-01

## 2023-01-01 RX ORDER — CARVEDILOL 25 MG/1
25 TABLET, FILM COATED ORAL TWICE DAILY
Qty: 60 | Refills: 0 | Status: DISCONTINUED | COMMUNITY
Start: 2017-05-01 | End: 2023-01-01

## 2023-01-01 RX ORDER — CIPROFLOXACIN AND DEXAMETHASONE 3; 1 MG/ML; MG/ML
4 SUSPENSION/ DROPS AURICULAR (OTIC)
Refills: 0 | Status: DISCONTINUED | OUTPATIENT
Start: 2023-01-01 | End: 2023-01-01

## 2023-01-01 RX ORDER — CARVEDILOL PHOSPHATE 80 MG/1
37.5 CAPSULE, EXTENDED RELEASE ORAL EVERY 12 HOURS
Refills: 0 | Status: DISCONTINUED | OUTPATIENT
Start: 2023-01-01 | End: 2023-01-01

## 2023-01-01 RX ORDER — MIDODRINE HYDROCHLORIDE 2.5 MG/1
20 TABLET ORAL EVERY 8 HOURS
Refills: 0 | Status: DISCONTINUED | OUTPATIENT
Start: 2023-01-01 | End: 2023-01-01

## 2023-01-01 RX ORDER — IPRATROPIUM/ALBUTEROL SULFATE 18-103MCG
3 AEROSOL WITH ADAPTER (GRAM) INHALATION EVERY 6 HOURS
Refills: 0 | Status: DISCONTINUED | OUTPATIENT
Start: 2023-01-01 | End: 2023-01-01

## 2023-01-01 RX ORDER — HUMAN INSULIN 100 [IU]/ML
4 INJECTION, SUSPENSION SUBCUTANEOUS EVERY 6 HOURS
Refills: 0 | Status: DISCONTINUED | OUTPATIENT
Start: 2023-01-01 | End: 2023-01-01

## 2023-01-01 RX ORDER — DIPHENHYDRAMINE HCL 50 MG
25 CAPSULE ORAL ONCE
Refills: 0 | Status: COMPLETED | OUTPATIENT
Start: 2023-01-01 | End: 2023-01-01

## 2023-01-01 RX ORDER — NOREPINEPHRINE BITARTRATE/D5W 8 MG/250ML
0.02 PLASTIC BAG, INJECTION (ML) INTRAVENOUS
Qty: 8 | Refills: 0 | Status: DISCONTINUED | OUTPATIENT
Start: 2023-01-01 | End: 2023-01-01

## 2023-01-01 RX ORDER — HUMAN INSULIN 100 [IU]/ML
5 INJECTION, SUSPENSION SUBCUTANEOUS EVERY 6 HOURS
Refills: 0 | Status: DISCONTINUED | OUTPATIENT
Start: 2023-01-01 | End: 2023-01-01

## 2023-01-01 RX ORDER — HUMAN INSULIN 100 [IU]/ML
8 INJECTION, SUSPENSION SUBCUTANEOUS EVERY 6 HOURS
Refills: 0 | Status: DISCONTINUED | OUTPATIENT
Start: 2023-01-01 | End: 2023-01-01

## 2023-01-01 RX ORDER — HUMAN INSULIN 100 [IU]/ML
14 INJECTION, SUSPENSION SUBCUTANEOUS EVERY 6 HOURS
Refills: 0 | Status: DISCONTINUED | OUTPATIENT
Start: 2023-01-01 | End: 2023-01-01

## 2023-01-01 RX ORDER — LANOLIN ALCOHOL/MO/W.PET/CERES
3 CREAM (GRAM) TOPICAL AT BEDTIME
Refills: 0 | Status: DISCONTINUED | OUTPATIENT
Start: 2023-01-01 | End: 2023-01-01

## 2023-01-01 RX ORDER — HUMAN INSULIN 100 [IU]/ML
16 INJECTION, SUSPENSION SUBCUTANEOUS EVERY 6 HOURS
Refills: 0 | Status: DISCONTINUED | OUTPATIENT
Start: 2023-01-01 | End: 2023-01-01

## 2023-01-01 RX ORDER — CEFEPIME 1 G/1
1000 INJECTION, POWDER, FOR SOLUTION INTRAMUSCULAR; INTRAVENOUS EVERY 24 HOURS
Refills: 0 | Status: DISCONTINUED | OUTPATIENT
Start: 2023-01-01 | End: 2023-01-01

## 2023-01-01 RX ORDER — MULTIVIT-MIN/FERROUS GLUCONATE 9 MG/15 ML
1 LIQUID (ML) ORAL
Refills: 0 | DISCHARGE

## 2023-01-01 RX ORDER — LETROZOLE 2.5 MG/1
1 TABLET, FILM COATED ORAL
Refills: 0 | DISCHARGE

## 2023-01-01 RX ORDER — IPRATROPIUM/ALBUTEROL SULFATE 18-103MCG
3 AEROSOL WITH ADAPTER (GRAM) INHALATION ONCE
Refills: 0 | Status: COMPLETED | OUTPATIENT
Start: 2023-01-01 | End: 2023-01-01

## 2023-01-01 RX ORDER — POLYMYXIN B SULFATE 500000 [USP'U]/1
750000 INJECTION, POWDER, LYOPHILIZED, FOR SOLUTION INTRAMUSCULAR; INTRATHECAL; INTRAVENOUS; OPHTHALMIC EVERY 12 HOURS
Refills: 0 | Status: DISCONTINUED | OUTPATIENT
Start: 2023-01-01 | End: 2023-01-01

## 2023-01-01 RX ORDER — VANCOMYCIN HCL 1 G
750 VIAL (EA) INTRAVENOUS ONCE
Refills: 0 | Status: COMPLETED | OUTPATIENT
Start: 2023-01-01 | End: 2023-01-01

## 2023-01-01 RX ORDER — FENTANYL CITRATE 50 UG/ML
50 INJECTION INTRAVENOUS ONCE
Refills: 0 | Status: DISCONTINUED | OUTPATIENT
Start: 2023-01-01 | End: 2023-01-01

## 2023-01-01 RX ORDER — DESMOPRESSIN ACETATE 0.1 MG/1
24 TABLET ORAL ONCE
Refills: 0 | Status: COMPLETED | OUTPATIENT
Start: 2023-01-01 | End: 2023-01-01

## 2023-01-01 RX ORDER — DEXTROSE 50 % IN WATER 50 %
50 SYRINGE (ML) INTRAVENOUS ONCE
Refills: 0 | Status: COMPLETED | OUTPATIENT
Start: 2023-01-01 | End: 2023-01-01

## 2023-01-01 RX ORDER — MIDODRINE HYDROCHLORIDE 2.5 MG/1
40 TABLET ORAL ONCE
Refills: 0 | Status: DISCONTINUED | OUTPATIENT
Start: 2023-01-01 | End: 2023-01-01

## 2023-01-01 RX ORDER — ASPIRIN/CALCIUM CARB/MAGNESIUM 324 MG
1 TABLET ORAL
Refills: 0 | DISCHARGE

## 2023-01-01 RX ORDER — MIRABEGRON 50 MG/1
50 TABLET, FILM COATED, EXTENDED RELEASE ORAL
Qty: 90 | Refills: 3 | Status: DISCONTINUED | COMMUNITY
Start: 2021-06-21 | End: 2023-01-01

## 2023-01-01 RX ORDER — INSULIN LISPRO 100/ML
VIAL (ML) SUBCUTANEOUS EVERY 6 HOURS
Refills: 0 | Status: DISCONTINUED | OUTPATIENT
Start: 2023-01-01 | End: 2023-01-01

## 2023-01-01 RX ORDER — LABETALOL HCL 100 MG
400 TABLET ORAL THREE TIMES A DAY
Refills: 0 | Status: DISCONTINUED | OUTPATIENT
Start: 2023-01-01 | End: 2023-01-01

## 2023-01-01 RX ORDER — HEPARIN SODIUM 5000 [USP'U]/ML
1000 INJECTION INTRAVENOUS; SUBCUTANEOUS
Qty: 25000 | Refills: 0 | Status: DISCONTINUED | OUTPATIENT
Start: 2023-01-01 | End: 2023-01-01

## 2023-01-01 RX ORDER — SODIUM BICARBONATE 1 MEQ/ML
50 SYRINGE (ML) INTRAVENOUS ONCE
Refills: 0 | Status: COMPLETED | OUTPATIENT
Start: 2023-01-01 | End: 2023-01-01

## 2023-01-01 RX ORDER — METRONIDAZOLE 500 MG
500 TABLET ORAL EVERY 8 HOURS
Refills: 0 | Status: DISCONTINUED | OUTPATIENT
Start: 2023-01-01 | End: 2023-01-01

## 2023-01-01 RX ORDER — INSULIN NPH HUM/REG INSULIN HM 70-30/ML
0 VIAL (ML) SUBCUTANEOUS
Refills: 0 | DISCHARGE

## 2023-01-01 RX ORDER — LABETALOL HCL 100 MG
20 TABLET ORAL ONCE
Refills: 0 | Status: COMPLETED | OUTPATIENT
Start: 2023-01-01 | End: 2023-01-01

## 2023-01-01 RX ORDER — DAPAGLIFLOZIN 10 MG/1
10 TABLET, FILM COATED ORAL EVERY 24 HOURS
Refills: 0 | Status: DISCONTINUED | OUTPATIENT
Start: 2023-01-01 | End: 2023-01-01

## 2023-01-01 RX ORDER — INSULIN HUMAN 100 [IU]/ML
(70-30) 100 INJECTION, SUSPENSION SUBCUTANEOUS
Refills: 0 | Status: ACTIVE | COMMUNITY
Start: 2017-09-18

## 2023-01-01 RX ORDER — SODIUM CHLORIDE 5 G/100ML
500 INJECTION, SOLUTION INTRAVENOUS
Refills: 0 | Status: DISCONTINUED | OUTPATIENT
Start: 2023-01-01 | End: 2023-01-01

## 2023-01-01 RX ORDER — CEFAZOLIN SODIUM 1 G
2000 VIAL (EA) INJECTION EVERY 8 HOURS
Refills: 0 | Status: DISCONTINUED | OUTPATIENT
Start: 2023-01-01 | End: 2023-01-01

## 2023-01-01 RX ORDER — PROPOFOL 10 MG/ML
10 INJECTION, EMULSION INTRAVENOUS
Qty: 1000 | Refills: 0 | Status: DISCONTINUED | OUTPATIENT
Start: 2023-01-01 | End: 2023-01-01

## 2023-01-01 RX ORDER — DEXTROSE 50 % IN WATER 50 %
50 SYRINGE (ML) INTRAVENOUS ONCE
Refills: 0 | Status: DISCONTINUED | OUTPATIENT
Start: 2023-01-01 | End: 2023-01-01

## 2023-01-01 RX ORDER — PSYLLIUM SEED (WITH DEXTROSE)
1 POWDER (GRAM) ORAL DAILY
Refills: 0 | Status: DISCONTINUED | OUTPATIENT
Start: 2023-01-01 | End: 2023-01-01

## 2023-01-01 RX ORDER — BUMETANIDE 0.25 MG/ML
0.5 INJECTION INTRAMUSCULAR; INTRAVENOUS
Qty: 20 | Refills: 0 | Status: DISCONTINUED | OUTPATIENT
Start: 2023-01-01 | End: 2023-01-01

## 2023-01-01 RX ORDER — PIPERACILLIN AND TAZOBACTAM 4; .5 G/20ML; G/20ML
3.38 INJECTION, POWDER, LYOPHILIZED, FOR SOLUTION INTRAVENOUS ONCE
Refills: 0 | Status: COMPLETED | OUTPATIENT
Start: 2023-01-01 | End: 2023-01-01

## 2023-01-01 RX ORDER — INSULIN NPH HUM/REG INSULIN HM 70-30/ML
6 VIAL (ML) SUBCUTANEOUS
Qty: 0 | Refills: 0 | DISCHARGE

## 2023-01-01 RX ORDER — INSULIN LISPRO 100/ML
12 VIAL (ML) SUBCUTANEOUS
Refills: 0 | Status: DISCONTINUED | OUTPATIENT
Start: 2023-01-01 | End: 2023-01-01

## 2023-01-01 RX ORDER — PROPOFOL 10 MG/ML
0.5 INJECTION, EMULSION INTRAVENOUS
Qty: 1000 | Refills: 0 | Status: DISCONTINUED | OUTPATIENT
Start: 2023-01-01 | End: 2023-01-01

## 2023-01-01 RX ORDER — INSULIN GLARGINE 100 [IU]/ML
34 INJECTION, SOLUTION SUBCUTANEOUS AT BEDTIME
Refills: 0 | Status: DISCONTINUED | OUTPATIENT
Start: 2023-01-01 | End: 2023-01-01

## 2023-01-01 RX ORDER — HUMAN INSULIN 100 [IU]/ML
7 INJECTION, SUSPENSION SUBCUTANEOUS EVERY 6 HOURS
Refills: 0 | Status: DISCONTINUED | OUTPATIENT
Start: 2023-01-01 | End: 2023-01-01

## 2023-01-01 RX ORDER — ATORVASTATIN CALCIUM 80 MG/1
10 TABLET, FILM COATED ORAL AT BEDTIME
Refills: 0 | Status: DISCONTINUED | OUTPATIENT
Start: 2023-01-01 | End: 2023-01-01

## 2023-01-01 RX ORDER — MEROPENEM 1 G/30ML
500 INJECTION INTRAVENOUS EVERY 24 HOURS
Refills: 0 | Status: DISCONTINUED | OUTPATIENT
Start: 2023-01-01 | End: 2023-01-01

## 2023-01-01 RX ORDER — INSULIN HUMAN 100 [IU]/ML
5 INJECTION, SOLUTION SUBCUTANEOUS ONCE
Refills: 0 | Status: DISCONTINUED | OUTPATIENT
Start: 2023-01-01 | End: 2023-01-01

## 2023-01-01 RX ORDER — POLYMYXIN B SULFATE 500000 [USP'U]/1
1000000 INJECTION, POWDER, LYOPHILIZED, FOR SOLUTION INTRAMUSCULAR; INTRATHECAL; INTRAVENOUS; OPHTHALMIC EVERY 12 HOURS
Refills: 0 | Status: DISCONTINUED | OUTPATIENT
Start: 2023-01-01 | End: 2023-01-01

## 2023-01-01 RX ORDER — BUMETANIDE 0.25 MG/ML
1 INJECTION INTRAMUSCULAR; INTRAVENOUS ONCE
Refills: 0 | Status: COMPLETED | OUTPATIENT
Start: 2023-01-01 | End: 2023-01-01

## 2023-01-01 RX ORDER — SODIUM,POTASSIUM PHOSPHATES 278-250MG
1 POWDER IN PACKET (EA) ORAL ONCE
Refills: 0 | Status: COMPLETED | OUTPATIENT
Start: 2023-01-01 | End: 2023-01-01

## 2023-01-01 RX ORDER — DONEPEZIL HYDROCHLORIDE 10 MG/1
10 TABLET ORAL
Qty: 90 | Refills: 3 | Status: ACTIVE | COMMUNITY
Start: 2021-12-28 | End: 1900-01-01

## 2023-01-01 RX ORDER — DROXIDOPA 100 MG/1
600 CAPSULE ORAL EVERY 8 HOURS
Refills: 0 | Status: DISCONTINUED | OUTPATIENT
Start: 2023-01-01 | End: 2023-01-01

## 2023-01-01 RX ORDER — ALTEPLASE 100 MG
2 KIT INTRAVENOUS ONCE
Refills: 0 | Status: DISCONTINUED | OUTPATIENT
Start: 2023-01-01 | End: 2023-01-01

## 2023-01-01 RX ORDER — INSULIN NPH HUM/REG INSULIN HM 70-30/ML
15 VIAL (ML) SUBCUTANEOUS
Qty: 0 | Refills: 0 | DISCHARGE

## 2023-01-01 RX ORDER — CHLORHEXIDINE GLUCONATE 213 G/1000ML
1 SOLUTION TOPICAL
Refills: 0 | Status: DISCONTINUED | OUTPATIENT
Start: 2023-01-01 | End: 2023-01-01

## 2023-01-01 RX ORDER — IPRATROPIUM BROMIDE 21 MCG
1 AEROSOL, SPRAY (ML) NASAL
Refills: 0 | DISCHARGE

## 2023-01-01 RX ORDER — ACETAMINOPHEN 500 MG
650 TABLET ORAL ONCE
Refills: 0 | Status: DISCONTINUED | OUTPATIENT
Start: 2023-01-01 | End: 2023-01-01

## 2023-01-01 RX ORDER — DEXTROSE 50 % IN WATER 50 %
25 SYRINGE (ML) INTRAVENOUS ONCE
Refills: 0 | Status: COMPLETED | OUTPATIENT
Start: 2023-01-01 | End: 2023-01-01

## 2023-01-01 RX ORDER — SODIUM CHLORIDE 5 G/100ML
500 INJECTION, SOLUTION INTRAVENOUS
Refills: 0 | Status: COMPLETED | OUTPATIENT
Start: 2023-01-01 | End: 2023-01-01

## 2023-01-01 RX ORDER — LABETALOL HCL 100 MG
300 TABLET ORAL THREE TIMES A DAY
Refills: 0 | Status: DISCONTINUED | OUTPATIENT
Start: 2023-01-01 | End: 2023-01-01

## 2023-01-01 RX ORDER — SEVELAMER CARBONATE 2400 MG/1
800 POWDER, FOR SUSPENSION ORAL THREE TIMES A DAY
Refills: 0 | Status: DISCONTINUED | OUTPATIENT
Start: 2023-01-01 | End: 2023-01-01

## 2023-01-01 RX ORDER — SODIUM CHLORIDE 9 MG/ML
1000 INJECTION, SOLUTION INTRAVENOUS ONCE
Refills: 0 | Status: COMPLETED | OUTPATIENT
Start: 2023-01-01 | End: 2023-01-01

## 2023-01-01 RX ORDER — CARBIDOPA AND LEVODOPA 25; 100 MG/1; MG/1
25-100 TABLET ORAL 3 TIMES DAILY
Qty: 90 | Refills: 5 | Status: DISCONTINUED | COMMUNITY
Start: 2022-03-18 | End: 2023-01-01

## 2023-01-01 RX ORDER — BUMETANIDE 0.25 MG/ML
4 INJECTION INTRAMUSCULAR; INTRAVENOUS ONCE
Refills: 0 | Status: COMPLETED | OUTPATIENT
Start: 2023-01-01 | End: 2023-01-01

## 2023-01-01 RX ORDER — FLUCONAZOLE 150 MG/1
200 TABLET ORAL EVERY 24 HOURS
Refills: 0 | Status: DISCONTINUED | OUTPATIENT
Start: 2023-01-01 | End: 2023-01-01

## 2023-01-01 RX ORDER — INSULIN GLARGINE 100 [IU]/ML
28 INJECTION, SOLUTION SUBCUTANEOUS AT BEDTIME
Refills: 0 | Status: DISCONTINUED | OUTPATIENT
Start: 2023-01-01 | End: 2023-01-01

## 2023-01-01 RX ORDER — HYDROCORTISONE 1 %
1 OINTMENT (GRAM) TOPICAL
Refills: 0 | Status: DISCONTINUED | OUTPATIENT
Start: 2023-01-01 | End: 2023-01-01

## 2023-01-01 RX ORDER — MIDODRINE HYDROCHLORIDE 2.5 MG/1
20 TABLET ORAL
Refills: 0 | Status: DISCONTINUED | OUTPATIENT
Start: 2023-01-01 | End: 2023-01-01

## 2023-01-01 RX ORDER — FENTANYL CITRATE 50 UG/ML
200 INJECTION INTRAVENOUS ONCE
Refills: 0 | Status: DISCONTINUED | OUTPATIENT
Start: 2023-01-01 | End: 2023-01-01

## 2023-01-01 RX ORDER — NICARDIPINE HYDROCHLORIDE 30 MG/1
5 CAPSULE, EXTENDED RELEASE ORAL
Qty: 40 | Refills: 0 | Status: DISCONTINUED | OUTPATIENT
Start: 2023-01-01 | End: 2023-01-01

## 2023-01-01 RX ORDER — SODIUM CHLORIDE 9 MG/ML
4 INJECTION INTRAMUSCULAR; INTRAVENOUS; SUBCUTANEOUS EVERY 6 HOURS
Refills: 0 | Status: DISCONTINUED | OUTPATIENT
Start: 2023-01-01 | End: 2023-01-01

## 2023-01-01 RX ORDER — BUDESONIDE, MICRONIZED 100 %
2 POWDER (GRAM) MISCELLANEOUS
Refills: 0 | DISCHARGE

## 2023-01-01 RX ORDER — DEXTROSE 50 % IN WATER 50 %
15 SYRINGE (ML) INTRAVENOUS ONCE
Refills: 0 | Status: DISCONTINUED | OUTPATIENT
Start: 2023-01-01 | End: 2023-01-01

## 2023-01-01 RX ORDER — DOXAZOSIN MESYLATE 4 MG
6 TABLET ORAL
Refills: 0 | Status: DISCONTINUED | OUTPATIENT
Start: 2023-01-01 | End: 2023-01-01

## 2023-01-01 RX ORDER — HUMAN INSULIN 100 [IU]/ML
6 INJECTION, SUSPENSION SUBCUTANEOUS EVERY 6 HOURS
Refills: 0 | Status: DISCONTINUED | OUTPATIENT
Start: 2023-01-01 | End: 2023-01-01

## 2023-01-01 RX ORDER — FORMOTEROL FUMARATE 12 MCG
2 CAPSULE, WITH INHALATION DEVICE INHALATION
Refills: 0 | DISCHARGE

## 2023-01-01 RX ORDER — INSULIN GLARGINE 100 [IU]/ML
15 INJECTION, SOLUTION SUBCUTANEOUS AT BEDTIME
Refills: 0 | Status: DISCONTINUED | OUTPATIENT
Start: 2023-01-01 | End: 2023-01-01

## 2023-01-01 RX ORDER — LETROZOLE 2.5 MG/1
2.5 TABLET, FILM COATED ORAL DAILY
Refills: 0 | Status: DISCONTINUED | OUTPATIENT
Start: 2023-01-01 | End: 2023-01-01

## 2023-01-01 RX ORDER — INSULIN GLARGINE 100 [IU]/ML
12 INJECTION, SOLUTION SUBCUTANEOUS AT BEDTIME
Refills: 0 | Status: DISCONTINUED | OUTPATIENT
Start: 2023-01-01 | End: 2023-01-01

## 2023-01-01 RX ORDER — METRONIDAZOLE 500 MG
TABLET ORAL
Refills: 0 | Status: DISCONTINUED | OUTPATIENT
Start: 2023-01-01 | End: 2023-01-01

## 2023-01-01 RX ORDER — IPRATROPIUM/ALBUTEROL SULFATE 18-103MCG
3 AEROSOL WITH ADAPTER (GRAM) INHALATION
Qty: 360 | Refills: 0
Start: 2023-01-01 | End: 2023-01-01

## 2023-01-01 RX ORDER — APIXABAN 2.5 MG/1
5 TABLET, FILM COATED ORAL EVERY 12 HOURS
Refills: 0 | Status: DISCONTINUED | OUTPATIENT
Start: 2023-01-01 | End: 2023-01-01

## 2023-01-01 RX ORDER — SODIUM CHLORIDE 9 MG/ML
2 INJECTION INTRAMUSCULAR; INTRAVENOUS; SUBCUTANEOUS
Refills: 0 | Status: DISCONTINUED | OUTPATIENT
Start: 2023-01-01 | End: 2023-01-01

## 2023-01-01 RX ORDER — OFLOXACIN 0.3 %
1 DROPS OPHTHALMIC (EYE)
Refills: 0 | Status: DISCONTINUED | OUTPATIENT
Start: 2023-01-01 | End: 2023-01-01

## 2023-01-01 RX ORDER — APIXABAN 2.5 MG/1
5 TABLET, FILM COATED ORAL EVERY 12 HOURS
Refills: 0 | Status: COMPLETED | OUTPATIENT
Start: 2023-01-01 | End: 2023-01-01

## 2023-01-01 RX ORDER — HUMAN INSULIN 100 [IU]/ML
12 INJECTION, SUSPENSION SUBCUTANEOUS EVERY 6 HOURS
Refills: 0 | Status: DISCONTINUED | OUTPATIENT
Start: 2023-01-01 | End: 2023-01-01

## 2023-01-01 RX ORDER — CEFEPIME 1 G/1
1000 INJECTION, POWDER, FOR SOLUTION INTRAMUSCULAR; INTRAVENOUS ONCE
Refills: 0 | Status: COMPLETED | OUTPATIENT
Start: 2023-01-01 | End: 2023-01-01

## 2023-01-01 RX ORDER — IPRATROPIUM/ALBUTEROL SULFATE 18-103MCG
3 AEROSOL WITH ADAPTER (GRAM) INHALATION EVERY 8 HOURS
Refills: 0 | Status: DISCONTINUED | OUTPATIENT
Start: 2023-01-01 | End: 2023-01-01

## 2023-01-01 RX ORDER — METFORMIN ER 750 MG 750 MG/1
750 TABLET ORAL
Qty: 180 | Refills: 1 | Status: DISCONTINUED | COMMUNITY
Start: 2018-08-02 | End: 2023-01-01

## 2023-01-01 RX ORDER — SODIUM,POTASSIUM PHOSPHATES 278-250MG
1 POWDER IN PACKET (EA) ORAL
Refills: 0 | Status: COMPLETED | OUTPATIENT
Start: 2023-01-01 | End: 2023-01-01

## 2023-01-01 RX ORDER — INSULIN LISPRO 100/ML
8 VIAL (ML) SUBCUTANEOUS
Refills: 0 | Status: DISCONTINUED | OUTPATIENT
Start: 2023-01-01 | End: 2023-01-01

## 2023-01-01 RX ORDER — MIDODRINE HYDROCHLORIDE 2.5 MG/1
10 TABLET ORAL THREE TIMES A DAY
Refills: 0 | Status: DISCONTINUED | OUTPATIENT
Start: 2023-01-01 | End: 2023-01-01

## 2023-01-01 RX ORDER — VASOPRESSIN 20 [USP'U]/ML
0.04 INJECTION INTRAVENOUS
Qty: 40 | Refills: 0 | Status: DISCONTINUED | OUTPATIENT
Start: 2023-01-01 | End: 2023-01-01

## 2023-01-01 RX ORDER — MEROPENEM 1 G/30ML
500 INJECTION INTRAVENOUS EVERY 12 HOURS
Refills: 0 | Status: COMPLETED | OUTPATIENT
Start: 2023-01-01 | End: 2023-01-01

## 2023-01-01 RX ORDER — POTASSIUM CHLORIDE 20 MEQ
40 PACKET (EA) ORAL ONCE
Refills: 0 | Status: COMPLETED | OUTPATIENT
Start: 2023-01-01 | End: 2023-01-01

## 2023-01-01 RX ORDER — INSULIN HUMAN 100 [IU]/ML
14 INJECTION, SOLUTION SUBCUTANEOUS ONCE
Refills: 0 | Status: COMPLETED | OUTPATIENT
Start: 2023-01-01 | End: 2023-01-01

## 2023-01-01 RX ORDER — ACETAMINOPHEN 500 MG
650 TABLET ORAL EVERY 6 HOURS
Refills: 0 | Status: DISCONTINUED | OUTPATIENT
Start: 2023-01-01 | End: 2023-01-01

## 2023-01-01 RX ORDER — ALBUMIN HUMAN 25 %
50 VIAL (ML) INTRAVENOUS EVERY 8 HOURS
Refills: 0 | Status: DISCONTINUED | OUTPATIENT
Start: 2023-01-01 | End: 2023-01-01

## 2023-01-01 RX ORDER — UMECLIDINIUM 62.5 UG/1
1 AEROSOL, POWDER ORAL
Refills: 0 | DISCHARGE

## 2023-01-01 RX ORDER — CHOLECALCIFEROL (VITAMIN D3) 125 MCG
1 CAPSULE ORAL
Refills: 0 | DISCHARGE

## 2023-01-01 RX ORDER — SODIUM CHLORIDE 9 MG/ML
1 INJECTION INTRAMUSCULAR; INTRAVENOUS; SUBCUTANEOUS
Refills: 0 | Status: DISCONTINUED | OUTPATIENT
Start: 2023-01-01 | End: 2023-01-01

## 2023-01-01 RX ORDER — DONEPEZIL HYDROCHLORIDE 10 MG/1
1 TABLET, FILM COATED ORAL
Qty: 0 | Refills: 0 | DISCHARGE

## 2023-01-01 RX ORDER — FENTANYL CITRATE 50 UG/ML
1 INJECTION INTRAVENOUS
Qty: 2500 | Refills: 0 | Status: DISCONTINUED | OUTPATIENT
Start: 2023-01-01 | End: 2023-01-01

## 2023-01-01 RX ORDER — SITAGLIPTIN 25 MG/1
25 TABLET, FILM COATED ORAL
Qty: 90 | Refills: 0 | Status: ACTIVE | COMMUNITY
Start: 2023-01-01

## 2023-01-01 RX ORDER — MIDODRINE HYDROCHLORIDE 2.5 MG/1
10 TABLET ORAL EVERY 8 HOURS
Refills: 0 | Status: DISCONTINUED | OUTPATIENT
Start: 2023-01-01 | End: 2023-01-01

## 2023-01-01 RX ORDER — MUPIROCIN 20 MG/G
1 OINTMENT TOPICAL
Refills: 0 | Status: DISCONTINUED | OUTPATIENT
Start: 2023-01-01 | End: 2023-01-01

## 2023-01-01 RX ORDER — SODIUM CHLORIDE 9 MG/ML
1000 INJECTION INTRAMUSCULAR; INTRAVENOUS; SUBCUTANEOUS ONCE
Refills: 0 | Status: DISCONTINUED | OUTPATIENT
Start: 2023-01-01 | End: 2023-01-01

## 2023-01-01 RX ORDER — CHLORHEXIDINE GLUCONATE 213 G/1000ML
15 SOLUTION TOPICAL EVERY 12 HOURS
Refills: 0 | Status: DISCONTINUED | OUTPATIENT
Start: 2023-01-01 | End: 2023-01-01

## 2023-01-01 RX ORDER — PIPERACILLIN AND TAZOBACTAM 4; .5 G/20ML; G/20ML
3.38 INJECTION, POWDER, LYOPHILIZED, FOR SOLUTION INTRAVENOUS EVERY 12 HOURS
Refills: 0 | Status: DISCONTINUED | OUTPATIENT
Start: 2023-01-01 | End: 2023-01-01

## 2023-01-01 RX ORDER — DAPTOMYCIN 500 MG/10ML
500 INJECTION, POWDER, LYOPHILIZED, FOR SOLUTION INTRAVENOUS
Refills: 0 | Status: DISCONTINUED | OUTPATIENT
Start: 2023-01-01 | End: 2023-01-01

## 2023-01-01 RX ORDER — HYDROMORPHONE HYDROCHLORIDE 2 MG/ML
0.5 INJECTION INTRAMUSCULAR; INTRAVENOUS; SUBCUTANEOUS ONCE
Refills: 0 | Status: DISCONTINUED | OUTPATIENT
Start: 2023-01-01 | End: 2023-01-01

## 2023-01-01 RX ORDER — MOXIFLOXACIN HYDROCHLORIDE TABLETS, 400 MG 400 MG/1
400 TABLET, FILM COATED ORAL DAILY
Refills: 0 | Status: DISCONTINUED | OUTPATIENT
Start: 2023-01-01 | End: 2023-01-01

## 2023-01-01 RX ORDER — DAPTOMYCIN 500 MG/10ML
500 INJECTION, POWDER, LYOPHILIZED, FOR SOLUTION INTRAVENOUS ONCE
Refills: 0 | Status: COMPLETED | OUTPATIENT
Start: 2023-01-01 | End: 2023-01-01

## 2023-01-01 RX ORDER — DOXAZOSIN MESYLATE 4 MG
1 TABLET ORAL
Qty: 30 | Refills: 0
Start: 2023-01-01 | End: 2023-01-01

## 2023-01-01 RX ORDER — BUDESONIDE, MICRONIZED 100 %
0.5 POWDER (GRAM) MISCELLANEOUS EVERY 12 HOURS
Refills: 0 | Status: DISCONTINUED | OUTPATIENT
Start: 2023-01-01 | End: 2023-01-01

## 2023-01-01 RX ORDER — NICARDIPINE HYDROCHLORIDE 30 MG/1
2.5 CAPSULE, EXTENDED RELEASE ORAL
Qty: 40 | Refills: 0 | Status: DISCONTINUED | OUTPATIENT
Start: 2023-01-01 | End: 2023-01-01

## 2023-01-01 RX ORDER — VANCOMYCIN HCL 1 G
1500 VIAL (EA) INTRAVENOUS ONCE
Refills: 0 | Status: COMPLETED | OUTPATIENT
Start: 2023-01-01 | End: 2023-01-01

## 2023-01-01 RX ORDER — LABETALOL HCL 100 MG
40 TABLET ORAL ONCE
Refills: 0 | Status: COMPLETED | OUTPATIENT
Start: 2023-01-01 | End: 2023-01-01

## 2023-01-01 RX ORDER — VANCOMYCIN HCL 1 G
1000 VIAL (EA) INTRAVENOUS ONCE
Refills: 0 | Status: DISCONTINUED | OUTPATIENT
Start: 2023-01-01 | End: 2023-01-01

## 2023-01-01 RX ORDER — INSULIN GLARGINE 100 [IU]/ML
25 INJECTION, SOLUTION SUBCUTANEOUS AT BEDTIME
Refills: 0 | Status: DISCONTINUED | OUTPATIENT
Start: 2023-01-01 | End: 2023-01-01

## 2023-01-01 RX ORDER — ERYTHROPOIETIN 10000 [IU]/ML
10000 INJECTION, SOLUTION INTRAVENOUS; SUBCUTANEOUS ONCE
Refills: 0 | Status: COMPLETED | OUTPATIENT
Start: 2023-01-01 | End: 2023-01-01

## 2023-01-01 RX ORDER — ALBUMIN HUMAN 25 %
250 VIAL (ML) INTRAVENOUS EVERY 6 HOURS
Refills: 0 | Status: COMPLETED | OUTPATIENT
Start: 2023-01-01 | End: 2023-01-01

## 2023-01-01 RX ORDER — INSULIN GLARGINE 100 [IU]/ML
21 INJECTION, SOLUTION SUBCUTANEOUS AT BEDTIME
Refills: 0 | Status: DISCONTINUED | OUTPATIENT
Start: 2023-01-01 | End: 2023-01-01

## 2023-01-01 RX ORDER — SEVELAMER CARBONATE 2400 MG/1
1600 POWDER, FOR SUSPENSION ORAL THREE TIMES A DAY
Refills: 0 | Status: DISCONTINUED | OUTPATIENT
Start: 2023-01-01 | End: 2023-01-01

## 2023-01-01 RX ORDER — APIXABAN 2.5 MG/1
2.5 TABLET, FILM COATED ORAL
Refills: 0 | Status: DISCONTINUED | OUTPATIENT
Start: 2023-01-01 | End: 2023-01-01

## 2023-01-01 RX ORDER — HYDROCORTISONE 1 %
1 OINTMENT (GRAM) TOPICAL
Qty: 1 | Refills: 0
Start: 2023-01-01 | End: 2023-01-01

## 2023-01-01 RX ORDER — LIPASE/PROTEASE/AMYLASE 16-48-48K
1 CAPSULE,DELAYED RELEASE (ENTERIC COATED) ORAL ONCE
Refills: 0 | Status: COMPLETED | OUTPATIENT
Start: 2023-01-01 | End: 2023-01-01

## 2023-01-01 RX ORDER — VALACYCLOVIR 500 MG/1
500 TABLET, FILM COATED ORAL EVERY 24 HOURS
Refills: 0 | Status: DISCONTINUED | OUTPATIENT
Start: 2023-01-01 | End: 2023-01-01

## 2023-01-01 RX ORDER — DROXIDOPA 100 MG/1
400 CAPSULE ORAL EVERY 8 HOURS
Refills: 0 | Status: DISCONTINUED | OUTPATIENT
Start: 2023-01-01 | End: 2023-01-01

## 2023-01-01 RX ORDER — MIDODRINE HYDROCHLORIDE 2.5 MG/1
40 TABLET ORAL EVERY 8 HOURS
Refills: 0 | Status: DISCONTINUED | OUTPATIENT
Start: 2023-01-01 | End: 2023-01-01

## 2023-01-01 RX ORDER — LOSARTAN POTASSIUM 100 MG/1
25 TABLET, FILM COATED ORAL DAILY
Refills: 0 | Status: DISCONTINUED | OUTPATIENT
Start: 2023-01-01 | End: 2023-01-01

## 2023-01-01 RX ORDER — MIDODRINE HYDROCHLORIDE 2.5 MG/1
20 TABLET ORAL ONCE
Refills: 0 | Status: DISCONTINUED | OUTPATIENT
Start: 2023-01-01 | End: 2023-01-01

## 2023-01-01 RX ORDER — CALCIUM GLUCONATE 100 MG/ML
2 VIAL (ML) INTRAVENOUS ONCE
Refills: 0 | Status: COMPLETED | OUTPATIENT
Start: 2023-01-01 | End: 2023-01-01

## 2023-01-01 RX ORDER — DESMOPRESSIN ACETATE 0.1 MG/1
20 TABLET ORAL ONCE
Refills: 0 | Status: DISCONTINUED | OUTPATIENT
Start: 2023-01-01 | End: 2023-01-01

## 2023-01-01 RX ORDER — DIPHENHYDRAMINE HCL 50 MG
25 CAPSULE ORAL EVERY 6 HOURS
Refills: 0 | Status: DISCONTINUED | OUTPATIENT
Start: 2023-01-01 | End: 2023-01-01

## 2023-01-01 RX ORDER — VALACYCLOVIR 500 MG/1
500 TABLET, FILM COATED ORAL THREE TIMES A DAY
Refills: 0 | Status: DISCONTINUED | OUTPATIENT
Start: 2023-01-01 | End: 2023-01-01

## 2023-01-01 RX ORDER — SODIUM CHLORIDE 9 MG/ML
10 INJECTION INTRAMUSCULAR; INTRAVENOUS; SUBCUTANEOUS
Refills: 0 | Status: DISCONTINUED | OUTPATIENT
Start: 2023-01-01 | End: 2023-01-01

## 2023-01-01 RX ORDER — CARVEDILOL 12.5 MG/1
12.5 TABLET, FILM COATED ORAL TWICE DAILY
Refills: 0 | Status: ACTIVE | COMMUNITY
Start: 2023-01-01

## 2023-01-01 RX ORDER — SODIUM CHLORIDE 9 MG/ML
250 INJECTION INTRAMUSCULAR; INTRAVENOUS; SUBCUTANEOUS
Refills: 0 | Status: DISCONTINUED | OUTPATIENT
Start: 2023-01-01 | End: 2023-01-01

## 2023-01-01 RX ORDER — INSULIN LISPRO 100/ML
9 VIAL (ML) SUBCUTANEOUS
Refills: 0 | Status: DISCONTINUED | OUTPATIENT
Start: 2023-01-01 | End: 2023-01-01

## 2023-01-01 RX ORDER — HUMAN INSULIN 100 [IU]/ML
2 INJECTION, SUSPENSION SUBCUTANEOUS EVERY 6 HOURS
Refills: 0 | Status: DISCONTINUED | OUTPATIENT
Start: 2023-01-01 | End: 2023-01-01

## 2023-01-01 RX ORDER — INSULIN GLARGINE 100 [IU]/ML
34 INJECTION, SOLUTION SUBCUTANEOUS
Qty: 4 | Refills: 0
Start: 2023-01-01 | End: 2023-01-01

## 2023-01-01 RX ORDER — FUROSEMIDE 40 MG
80 TABLET ORAL ONCE
Refills: 0 | Status: COMPLETED | OUTPATIENT
Start: 2023-01-01 | End: 2023-01-01

## 2023-01-01 RX ORDER — GLUCAGON INJECTION, SOLUTION 0.5 MG/.1ML
1 INJECTION, SOLUTION SUBCUTANEOUS ONCE
Refills: 0 | Status: DISCONTINUED | OUTPATIENT
Start: 2023-01-01 | End: 2023-01-01

## 2023-01-01 RX ORDER — CHLORHEXIDINE GLUCONATE 213 G/1000ML
1 SOLUTION TOPICAL DAILY
Refills: 0 | Status: DISCONTINUED | OUTPATIENT
Start: 2023-01-01 | End: 2023-01-01

## 2023-01-01 RX ORDER — NOREPINEPHRINE BITARTRATE/D5W 8 MG/250ML
0.08 PLASTIC BAG, INJECTION (ML) INTRAVENOUS
Qty: 8 | Refills: 0 | Status: DISCONTINUED | OUTPATIENT
Start: 2023-01-01 | End: 2023-01-01

## 2023-01-01 RX ORDER — LABETALOL HCL 100 MG
5 TABLET ORAL ONCE
Refills: 0 | Status: COMPLETED | OUTPATIENT
Start: 2023-01-01 | End: 2023-01-01

## 2023-01-01 RX ORDER — TRANEXAMIC ACID 100 MG/ML
10 INJECTION, SOLUTION INTRAVENOUS ONCE
Refills: 0 | Status: DISCONTINUED | OUTPATIENT
Start: 2023-01-01 | End: 2023-01-01

## 2023-01-01 RX ORDER — DROXIDOPA 100 MG/1
100 CAPSULE ORAL THREE TIMES A DAY
Refills: 0 | Status: DISCONTINUED | OUTPATIENT
Start: 2023-01-01 | End: 2023-01-01

## 2023-01-01 RX ORDER — POLYETHYLENE GLYCOL 3350 17 G/17G
17 POWDER, FOR SOLUTION ORAL
Refills: 0 | Status: DISCONTINUED | OUTPATIENT
Start: 2023-01-01 | End: 2023-01-01

## 2023-01-01 RX ORDER — LEVETIRACETAM 250 MG/1
1000 TABLET, FILM COATED ORAL DAILY
Refills: 0 | Status: DISCONTINUED | OUTPATIENT
Start: 2023-01-01 | End: 2023-01-01

## 2023-01-01 RX ORDER — HEPARIN SODIUM 5000 [USP'U]/ML
1050 INJECTION INTRAVENOUS; SUBCUTANEOUS
Qty: 25000 | Refills: 0 | Status: DISCONTINUED | OUTPATIENT
Start: 2023-01-01 | End: 2023-01-01

## 2023-01-01 RX ORDER — SODIUM CHLORIDE 9 MG/ML
500 INJECTION, SOLUTION INTRAVENOUS
Refills: 0 | Status: DISCONTINUED | OUTPATIENT
Start: 2023-01-01 | End: 2023-01-01

## 2023-01-01 RX ORDER — METFORMIN HYDROCHLORIDE 850 MG/1
0 TABLET ORAL
Qty: 0 | Refills: 0 | DISCHARGE

## 2023-01-01 RX ORDER — INSULIN LISPRO 100/ML
VIAL (ML) SUBCUTANEOUS AT BEDTIME
Refills: 0 | Status: DISCONTINUED | OUTPATIENT
Start: 2023-01-01 | End: 2023-01-01

## 2023-01-01 RX ORDER — ACETIC ACID
1 LIQUID (ML) MISCELLANEOUS
Refills: 0 | Status: DISCONTINUED | OUTPATIENT
Start: 2023-01-01 | End: 2023-01-01

## 2023-01-01 RX ORDER — MIDAZOLAM HYDROCHLORIDE 1 MG/ML
5 INJECTION, SOLUTION INTRAMUSCULAR; INTRAVENOUS ONCE
Refills: 0 | Status: DISCONTINUED | OUTPATIENT
Start: 2023-01-01 | End: 2023-01-01

## 2023-01-01 RX ORDER — POTASSIUM CHLORIDE 20 MEQ
20 PACKET (EA) ORAL ONCE
Refills: 0 | Status: COMPLETED | OUTPATIENT
Start: 2023-01-01 | End: 2023-01-01

## 2023-01-01 RX ORDER — DROXIDOPA 100 MG/1
600 CAPSULE ORAL
Refills: 0 | Status: DISCONTINUED | OUTPATIENT
Start: 2023-01-01 | End: 2023-01-01

## 2023-01-01 RX ORDER — INSULIN HUMAN 100 [IU]/ML
10 INJECTION, SOLUTION SUBCUTANEOUS ONCE
Refills: 0 | Status: COMPLETED | OUTPATIENT
Start: 2023-01-01 | End: 2023-01-01

## 2023-01-01 RX ORDER — POTASSIUM PHOSPHATE, MONOBASIC POTASSIUM PHOSPHATE, DIBASIC 236; 224 MG/ML; MG/ML
30 INJECTION, SOLUTION INTRAVENOUS ONCE
Refills: 0 | Status: COMPLETED | OUTPATIENT
Start: 2023-01-01 | End: 2023-01-01

## 2023-01-01 RX ORDER — SODIUM CHLORIDE 9 MG/ML
1000 INJECTION INTRAMUSCULAR; INTRAVENOUS; SUBCUTANEOUS
Refills: 0 | Status: DISCONTINUED | OUTPATIENT
Start: 2023-01-01 | End: 2023-01-01

## 2023-01-01 RX ORDER — PREDNISONE 10 MG/1
10 TABLET ORAL
Qty: 3 | Refills: 0 | Status: ACTIVE | COMMUNITY
Start: 2023-01-01

## 2023-01-01 RX ORDER — ATORVASTATIN CALCIUM 80 MG/1
1 TABLET, FILM COATED ORAL
Qty: 0 | Refills: 0 | DISCHARGE

## 2023-01-01 RX ORDER — SODIUM,POTASSIUM PHOSPHATES 278-250MG
2 POWDER IN PACKET (EA) ORAL ONCE
Refills: 0 | Status: COMPLETED | OUTPATIENT
Start: 2023-01-01 | End: 2023-01-01

## 2023-01-01 RX ORDER — SITAGLIPTIN 50 MG/1
1 TABLET, FILM COATED ORAL
Refills: 0 | DISCHARGE

## 2023-01-01 RX ORDER — LEVETIRACETAM 250 MG/1
250 TABLET, FILM COATED ORAL
Refills: 0 | Status: DISCONTINUED | OUTPATIENT
Start: 2023-01-01 | End: 2023-01-01

## 2023-01-01 RX ORDER — FERROUS SULFATE 325(65) MG
300 TABLET ORAL DAILY
Refills: 0 | Status: DISCONTINUED | OUTPATIENT
Start: 2023-01-01 | End: 2023-01-01

## 2023-01-01 RX ORDER — ONDANSETRON 8 MG/1
4 TABLET, FILM COATED ORAL ONCE
Refills: 0 | Status: COMPLETED | OUTPATIENT
Start: 2023-01-01 | End: 2023-01-01

## 2023-01-01 RX ORDER — ALBUMIN HUMAN 25 %
50 VIAL (ML) INTRAVENOUS EVERY 6 HOURS
Refills: 0 | Status: DISCONTINUED | OUTPATIENT
Start: 2023-01-01 | End: 2023-01-01

## 2023-01-01 RX ORDER — ATORVASTATIN CALCIUM 80 MG/1
40 TABLET, FILM COATED ORAL AT BEDTIME
Refills: 0 | Status: DISCONTINUED | OUTPATIENT
Start: 2023-01-01 | End: 2023-01-01

## 2023-01-01 RX ORDER — DEXMEDETOMIDINE HYDROCHLORIDE IN 0.9% SODIUM CHLORIDE 4 UG/ML
0.2 INJECTION INTRAVENOUS
Qty: 400 | Refills: 0 | Status: DISCONTINUED | OUTPATIENT
Start: 2023-01-01 | End: 2023-01-01

## 2023-01-01 RX ORDER — NOREPINEPHRINE BITARTRATE/D5W 8 MG/250ML
0.06 PLASTIC BAG, INJECTION (ML) INTRAVENOUS
Qty: 8 | Refills: 0 | Status: DISCONTINUED | OUTPATIENT
Start: 2023-01-01 | End: 2023-01-01

## 2023-01-01 RX ORDER — DOXAZOSIN 8 MG/1
8 TABLET ORAL DAILY
Refills: 0 | Status: ACTIVE | COMMUNITY
Start: 2023-01-01

## 2023-01-01 RX ORDER — DESMOPRESSIN ACETATE 0.1 MG/1
20 TABLET ORAL ONCE
Refills: 0 | Status: COMPLETED | OUTPATIENT
Start: 2023-01-01 | End: 2023-01-01

## 2023-01-01 RX ORDER — MAGNESIUM SULFATE 500 MG/ML
2 VIAL (ML) INJECTION ONCE
Refills: 0 | Status: COMPLETED | OUTPATIENT
Start: 2023-01-01 | End: 2023-01-01

## 2023-01-01 RX ORDER — AZTREONAM 2 G
1000 VIAL (EA) INJECTION EVERY 6 HOURS
Refills: 0 | Status: DISCONTINUED | OUTPATIENT
Start: 2023-01-01 | End: 2023-01-01

## 2023-01-01 RX ORDER — MIDAZOLAM HYDROCHLORIDE 1 MG/ML
6 INJECTION, SOLUTION INTRAMUSCULAR; INTRAVENOUS ONCE
Refills: 0 | Status: DISCONTINUED | OUTPATIENT
Start: 2023-01-01 | End: 2023-01-01

## 2023-01-01 RX ORDER — DIPHENHYDRAMINE HCL 50 MG
50 CAPSULE ORAL ONCE
Refills: 0 | Status: DISCONTINUED | OUTPATIENT
Start: 2023-01-01 | End: 2023-01-01

## 2023-01-01 RX ORDER — SODIUM ZIRCONIUM CYCLOSILICATE 10 G/10G
10 POWDER, FOR SUSPENSION ORAL ONCE
Refills: 0 | Status: COMPLETED | OUTPATIENT
Start: 2023-01-01 | End: 2023-01-01

## 2023-01-01 RX ORDER — ACETAMINOPHEN 500 MG
1000 TABLET ORAL ONCE
Refills: 0 | Status: DISCONTINUED | OUTPATIENT
Start: 2023-01-01 | End: 2023-01-01

## 2023-01-01 RX ORDER — ERYTHROMYCIN BASE 5 MG/GRAM
1 OINTMENT (GRAM) OPHTHALMIC (EYE)
Refills: 0 | Status: DISCONTINUED | OUTPATIENT
Start: 2023-01-01 | End: 2023-01-01

## 2023-01-01 RX ORDER — OFLOXACIN 0.3 %
1 DROPS OPHTHALMIC (EYE) EVERY 4 HOURS
Refills: 0 | Status: DISCONTINUED | OUTPATIENT
Start: 2023-01-01 | End: 2023-01-01

## 2023-01-01 RX ORDER — CALAMINE AND ZINC OXIDE AND PHENOL 160; 10 MG/ML; MG/ML
1 LOTION TOPICAL
Refills: 0 | Status: DISCONTINUED | OUTPATIENT
Start: 2023-01-01 | End: 2023-01-01

## 2023-01-01 RX ORDER — LABETALOL HCL 100 MG
600 TABLET ORAL THREE TIMES A DAY
Refills: 0 | Status: DISCONTINUED | OUTPATIENT
Start: 2023-01-01 | End: 2023-01-01

## 2023-01-01 RX ORDER — PROPOFOL 10 MG/ML
5 INJECTION, EMULSION INTRAVENOUS
Qty: 500 | Refills: 0 | Status: DISCONTINUED | OUTPATIENT
Start: 2023-01-01 | End: 2023-01-01

## 2023-01-01 RX ORDER — SENNA PLUS 8.6 MG/1
10 TABLET ORAL DAILY
Refills: 0 | Status: DISCONTINUED | OUTPATIENT
Start: 2023-01-01 | End: 2023-01-01

## 2023-01-01 RX ORDER — LANOLIN ALCOHOL/MO/W.PET/CERES
5 CREAM (GRAM) TOPICAL AT BEDTIME
Refills: 0 | Status: DISCONTINUED | OUTPATIENT
Start: 2023-01-01 | End: 2023-01-01

## 2023-01-01 RX ORDER — FLUTICASONE PROPIONATE 50 UG/1
50 SPRAY, METERED NASAL
Qty: 16 | Refills: 0 | Status: ACTIVE | COMMUNITY
Start: 2023-01-01

## 2023-01-01 RX ORDER — MUPIROCIN 20 MG/G
1 OINTMENT TOPICAL
Refills: 0 | Status: COMPLETED | OUTPATIENT
Start: 2023-01-01 | End: 2023-01-01

## 2023-01-01 RX ORDER — SODIUM CHLORIDE 9 MG/ML
3 INJECTION INTRAMUSCULAR; INTRAVENOUS; SUBCUTANEOUS EVERY 6 HOURS
Refills: 0 | Status: DISCONTINUED | OUTPATIENT
Start: 2023-01-01 | End: 2023-01-01

## 2023-01-01 RX ORDER — METRONIDAZOLE 500 MG
500 TABLET ORAL ONCE
Refills: 0 | Status: COMPLETED | OUTPATIENT
Start: 2023-01-01 | End: 2023-01-01

## 2023-01-01 RX ORDER — FUROSEMIDE 40 MG
20 TABLET ORAL ONCE
Refills: 0 | Status: COMPLETED | OUTPATIENT
Start: 2023-01-01 | End: 2023-01-01

## 2023-01-01 RX ORDER — SODIUM BICARBONATE 1 MEQ/ML
325 SYRINGE (ML) INTRAVENOUS ONCE
Refills: 0 | Status: COMPLETED | OUTPATIENT
Start: 2023-01-01 | End: 2023-01-01

## 2023-01-01 RX ORDER — HUMAN INSULIN 100 [IU]/ML
4 INJECTION, SUSPENSION SUBCUTANEOUS ONCE
Refills: 0 | Status: COMPLETED | OUTPATIENT
Start: 2023-01-01 | End: 2023-01-01

## 2023-01-01 RX ORDER — PROPOFOL 10 MG/ML
20 INJECTION, EMULSION INTRAVENOUS
Qty: 1000 | Refills: 0 | Status: DISCONTINUED | OUTPATIENT
Start: 2023-01-01 | End: 2023-01-01

## 2023-01-01 RX ORDER — LABETALOL HCL 100 MG
500 TABLET ORAL THREE TIMES A DAY
Refills: 0 | Status: DISCONTINUED | OUTPATIENT
Start: 2023-01-01 | End: 2023-01-01

## 2023-01-01 RX ORDER — LETROZOLE 2.5 MG/1
1 TABLET, FILM COATED ORAL
Qty: 0 | Refills: 0 | DISCHARGE

## 2023-01-01 RX ORDER — DOXAZOSIN MESYLATE 4 MG
4 TABLET ORAL AT BEDTIME
Refills: 0 | Status: DISCONTINUED | OUTPATIENT
Start: 2023-01-01 | End: 2023-01-01

## 2023-01-01 RX ORDER — SODIUM CHLORIDE 9 MG/ML
3 INJECTION INTRAMUSCULAR; INTRAVENOUS; SUBCUTANEOUS
Qty: 0 | Refills: 0 | DISCHARGE
Start: 2023-01-01

## 2023-01-01 RX ORDER — ALBUMIN HUMAN 25 %
250 VIAL (ML) INTRAVENOUS ONCE
Refills: 0 | Status: COMPLETED | OUTPATIENT
Start: 2023-01-01 | End: 2023-01-01

## 2023-01-01 RX ORDER — SODIUM CHLORIDE 9 MG/ML
4 INJECTION INTRAMUSCULAR; INTRAVENOUS; SUBCUTANEOUS EVERY 12 HOURS
Refills: 0 | Status: DISCONTINUED | OUTPATIENT
Start: 2023-01-01 | End: 2023-01-01

## 2023-01-01 RX ORDER — PETROLATUM,WHITE
1 JELLY (GRAM) TOPICAL ONCE
Refills: 0 | Status: COMPLETED | OUTPATIENT
Start: 2023-01-01 | End: 2023-01-01

## 2023-01-01 RX ORDER — NOREPINEPHRINE BITARTRATE/D5W 8 MG/250ML
0.09 PLASTIC BAG, INJECTION (ML) INTRAVENOUS
Qty: 8 | Refills: 0 | Status: DISCONTINUED | OUTPATIENT
Start: 2023-01-01 | End: 2023-01-01

## 2023-01-01 RX ORDER — SODIUM CHLORIDE 9 MG/ML
500 INJECTION, SOLUTION INTRAVENOUS ONCE
Refills: 0 | Status: COMPLETED | OUTPATIENT
Start: 2023-01-01 | End: 2023-01-01

## 2023-01-01 RX ORDER — CHOLECALCIFEROL (VITAMIN D3) 125 MCG
2000 CAPSULE ORAL DAILY
Refills: 0 | Status: DISCONTINUED | OUTPATIENT
Start: 2023-01-01 | End: 2023-01-01

## 2023-01-01 RX ORDER — CALAMINE AND ZINC OXIDE AND PHENOL 160; 10 MG/ML; MG/ML
1 LOTION TOPICAL DAILY
Refills: 0 | Status: DISCONTINUED | OUTPATIENT
Start: 2023-01-01 | End: 2023-01-01

## 2023-01-01 RX ORDER — FENTANYL CITRATE 50 UG/ML
0.56 INJECTION INTRAVENOUS
Qty: 2500 | Refills: 0 | Status: DISCONTINUED | OUTPATIENT
Start: 2023-01-01 | End: 2023-01-01

## 2023-01-01 RX ORDER — HYDROCORTISONE 20 MG
25 TABLET ORAL EVERY 8 HOURS
Refills: 0 | Status: COMPLETED | OUTPATIENT
Start: 2023-01-01 | End: 2023-01-01

## 2023-01-01 RX ORDER — BUMETANIDE 0.25 MG/ML
4 INJECTION INTRAMUSCULAR; INTRAVENOUS ONCE
Refills: 0 | Status: DISCONTINUED | OUTPATIENT
Start: 2023-01-01 | End: 2023-01-01

## 2023-01-01 RX ORDER — FUROSEMIDE 40 MG
40 TABLET ORAL ONCE
Refills: 0 | Status: COMPLETED | OUTPATIENT
Start: 2023-01-01 | End: 2023-01-01

## 2023-01-01 RX ORDER — NITROGLYCERIN 6.5 MG
20 CAPSULE, EXTENDED RELEASE ORAL
Qty: 50 | Refills: 0 | Status: DISCONTINUED | OUTPATIENT
Start: 2023-01-01 | End: 2023-01-01

## 2023-01-01 RX ORDER — DOXAZOSIN MESYLATE 4 MG
0 TABLET ORAL
Refills: 0 | DISCHARGE

## 2023-01-01 RX ORDER — INSULIN LISPRO 100/ML
10 VIAL (ML) SUBCUTANEOUS
Refills: 0 | Status: DISCONTINUED | OUTPATIENT
Start: 2023-01-01 | End: 2023-01-01

## 2023-01-01 RX ORDER — ATORVASTATIN CALCIUM 80 MG/1
1 TABLET, FILM COATED ORAL
Refills: 0 | DISCHARGE

## 2023-01-01 RX ORDER — DONEPEZIL HYDROCHLORIDE 10 MG/1
1 TABLET, FILM COATED ORAL
Refills: 0 | DISCHARGE

## 2023-01-01 RX ORDER — INSULIN LISPRO 100/ML
3 VIAL (ML) SUBCUTANEOUS
Refills: 0 | Status: DISCONTINUED | OUTPATIENT
Start: 2023-01-01 | End: 2023-01-01

## 2023-01-01 RX ORDER — POTASSIUM CHLORIDE 20 MEQ
10 PACKET (EA) ORAL
Refills: 0 | Status: DISCONTINUED | OUTPATIENT
Start: 2023-01-01 | End: 2023-01-01

## 2023-01-01 RX ORDER — CICLOPIROX OLAMINE 7.7 MG/G
1 CREAM TOPICAL
Refills: 0 | DISCHARGE

## 2023-01-01 RX ORDER — APIXABAN 2.5 MG/1
10 TABLET, FILM COATED ORAL EVERY 12 HOURS
Refills: 0 | Status: COMPLETED | OUTPATIENT
Start: 2023-01-01 | End: 2023-01-01

## 2023-01-01 RX ORDER — BUMETANIDE 0.25 MG/ML
4 INJECTION INTRAMUSCULAR; INTRAVENOUS
Qty: 20 | Refills: 0 | Status: DISCONTINUED | OUTPATIENT
Start: 2023-01-01 | End: 2023-01-01

## 2023-01-01 RX ORDER — FERROUS SULFATE 325(65) MG
325 TABLET ORAL DAILY
Refills: 0 | Status: DISCONTINUED | OUTPATIENT
Start: 2023-01-01 | End: 2023-01-01

## 2023-01-01 RX ORDER — INSULIN LISPRO 100/ML
7 VIAL (ML) SUBCUTANEOUS
Refills: 0 | Status: DISCONTINUED | OUTPATIENT
Start: 2023-01-01 | End: 2023-01-01

## 2023-01-01 RX ORDER — LABETALOL HCL 100 MG
200 TABLET ORAL EVERY 12 HOURS
Refills: 0 | Status: DISCONTINUED | OUTPATIENT
Start: 2023-01-01 | End: 2023-01-01

## 2023-01-01 RX ADMIN — HUMAN INSULIN 3 UNIT(S): 100 INJECTION, SUSPENSION SUBCUTANEOUS at 06:44

## 2023-01-01 RX ADMIN — Medication 1 DROP(S): at 11:51

## 2023-01-01 RX ADMIN — Medication 2: at 23:17

## 2023-01-01 RX ADMIN — ATORVASTATIN CALCIUM 10 MILLIGRAM(S): 80 TABLET, FILM COATED ORAL at 21:55

## 2023-01-01 RX ADMIN — MIDODRINE HYDROCHLORIDE 40 MILLIGRAM(S): 2.5 TABLET ORAL at 12:19

## 2023-01-01 RX ADMIN — ATORVASTATIN CALCIUM 10 MILLIGRAM(S): 80 TABLET, FILM COATED ORAL at 22:54

## 2023-01-01 RX ADMIN — Medication 3 MILLILITER(S): at 15:14

## 2023-01-01 RX ADMIN — MUPIROCIN 1 APPLICATION(S): 20 OINTMENT TOPICAL at 05:37

## 2023-01-01 RX ADMIN — MIDAZOLAM HYDROCHLORIDE 2 MILLIGRAM(S): 1 INJECTION, SOLUTION INTRAMUSCULAR; INTRAVENOUS at 11:37

## 2023-01-01 RX ADMIN — DROXIDOPA 200 MILLIGRAM(S): 100 CAPSULE ORAL at 15:07

## 2023-01-01 RX ADMIN — Medication 1 DROP(S): at 13:07

## 2023-01-01 RX ADMIN — HUMAN INSULIN 18 UNIT(S): 100 INJECTION, SUSPENSION SUBCUTANEOUS at 18:00

## 2023-01-01 RX ADMIN — SODIUM CHLORIDE 1 GRAM(S): 9 INJECTION INTRAMUSCULAR; INTRAVENOUS; SUBCUTANEOUS at 17:48

## 2023-01-01 RX ADMIN — SODIUM CHLORIDE 4 MILLILITER(S): 9 INJECTION INTRAMUSCULAR; INTRAVENOUS; SUBCUTANEOUS at 03:43

## 2023-01-01 RX ADMIN — HUMAN INSULIN 5 UNIT(S): 100 INJECTION, SUSPENSION SUBCUTANEOUS at 23:36

## 2023-01-01 RX ADMIN — ALBUTEROL 2.5 MILLIGRAM(S): 90 AEROSOL, METERED ORAL at 03:24

## 2023-01-01 RX ADMIN — Medication 1 APPLICATION(S): at 11:50

## 2023-01-01 RX ADMIN — CARVEDILOL PHOSPHATE 37.5 MILLIGRAM(S): 80 CAPSULE, EXTENDED RELEASE ORAL at 05:36

## 2023-01-01 RX ADMIN — FENTANYL CITRATE 3.75 MICROGRAM(S)/KG/HR: 50 INJECTION INTRAVENOUS at 19:49

## 2023-01-01 RX ADMIN — SODIUM CHLORIDE 1 GRAM(S): 9 INJECTION INTRAMUSCULAR; INTRAVENOUS; SUBCUTANEOUS at 05:02

## 2023-01-01 RX ADMIN — Medication 4: at 18:10

## 2023-01-01 RX ADMIN — Medication 1 DROP(S): at 23:31

## 2023-01-01 RX ADMIN — MUPIROCIN 1 APPLICATION(S): 20 OINTMENT TOPICAL at 05:33

## 2023-01-01 RX ADMIN — Medication 1 DROP(S): at 17:16

## 2023-01-01 RX ADMIN — CHLORHEXIDINE GLUCONATE 15 MILLILITER(S): 213 SOLUTION TOPICAL at 05:40

## 2023-01-01 RX ADMIN — CHLORHEXIDINE GLUCONATE 15 MILLILITER(S): 213 SOLUTION TOPICAL at 05:34

## 2023-01-01 RX ADMIN — DEXMEDETOMIDINE HYDROCHLORIDE IN 0.9% SODIUM CHLORIDE 1.66 MICROGRAM(S)/KG/HR: 4 INJECTION INTRAVENOUS at 04:53

## 2023-01-01 RX ADMIN — SODIUM CHLORIDE 4 MILLILITER(S): 9 INJECTION INTRAMUSCULAR; INTRAVENOUS; SUBCUTANEOUS at 16:32

## 2023-01-01 RX ADMIN — ALBUTEROL 2.5 MILLIGRAM(S): 90 AEROSOL, METERED ORAL at 23:33

## 2023-01-01 RX ADMIN — PROPOFOL 3.99 MICROGRAM(S)/KG/MIN: 10 INJECTION, EMULSION INTRAVENOUS at 00:59

## 2023-01-01 RX ADMIN — Medication 400 MILLIGRAM(S): at 08:54

## 2023-01-01 RX ADMIN — Medication 1 APPLICATION(S): at 11:37

## 2023-01-01 RX ADMIN — DONEPEZIL HYDROCHLORIDE 10 MILLIGRAM(S): 10 TABLET, FILM COATED ORAL at 21:43

## 2023-01-01 RX ADMIN — PANTOPRAZOLE SODIUM 40 MILLIGRAM(S): 20 TABLET, DELAYED RELEASE ORAL at 17:14

## 2023-01-01 RX ADMIN — Medication 100 MILLIGRAM(S): at 18:09

## 2023-01-01 RX ADMIN — Medication 500 MILLIGRAM(S): at 21:05

## 2023-01-01 RX ADMIN — Medication 1 DROP(S): at 22:23

## 2023-01-01 RX ADMIN — Medication 6: at 16:50

## 2023-01-01 RX ADMIN — ALBUTEROL 2.5 MILLIGRAM(S): 90 AEROSOL, METERED ORAL at 10:59

## 2023-01-01 RX ADMIN — Medication 2: at 23:04

## 2023-01-01 RX ADMIN — Medication 650 MILLIGRAM(S): at 12:39

## 2023-01-01 RX ADMIN — Medication 1 DROP(S): at 09:18

## 2023-01-01 RX ADMIN — Medication 100 MILLILITER(S): at 15:48

## 2023-01-01 RX ADMIN — MIDODRINE HYDROCHLORIDE 40 MILLIGRAM(S): 2.5 TABLET ORAL at 15:14

## 2023-01-01 RX ADMIN — Medication 1 APPLICATION(S): at 06:03

## 2023-01-01 RX ADMIN — SODIUM CHLORIDE 1 GRAM(S): 9 INJECTION INTRAMUSCULAR; INTRAVENOUS; SUBCUTANEOUS at 05:15

## 2023-01-01 RX ADMIN — HUMAN INSULIN 3 UNIT(S): 100 INJECTION, SUSPENSION SUBCUTANEOUS at 06:09

## 2023-01-01 RX ADMIN — Medication 3 MILLILITER(S): at 15:12

## 2023-01-01 RX ADMIN — Medication 4: at 11:52

## 2023-01-01 RX ADMIN — DROXIDOPA 600 MILLIGRAM(S): 100 CAPSULE ORAL at 11:12

## 2023-01-01 RX ADMIN — HUMAN INSULIN 4 UNIT(S): 100 INJECTION, SUSPENSION SUBCUTANEOUS at 23:33

## 2023-01-01 RX ADMIN — Medication 650 MILLIGRAM(S): at 09:22

## 2023-01-01 RX ADMIN — SODIUM CHLORIDE 1 GRAM(S): 9 INJECTION INTRAMUSCULAR; INTRAVENOUS; SUBCUTANEOUS at 17:38

## 2023-01-01 RX ADMIN — HEPARIN SODIUM 5000 UNIT(S): 5000 INJECTION INTRAVENOUS; SUBCUTANEOUS at 21:19

## 2023-01-01 RX ADMIN — BUMETANIDE 2 MILLIGRAM(S): 0.25 INJECTION INTRAMUSCULAR; INTRAVENOUS at 17:56

## 2023-01-01 RX ADMIN — SODIUM CHLORIDE 2 GRAM(S): 9 INJECTION INTRAMUSCULAR; INTRAVENOUS; SUBCUTANEOUS at 06:54

## 2023-01-01 RX ADMIN — SODIUM CHLORIDE 3 MILLILITER(S): 9 INJECTION INTRAMUSCULAR; INTRAVENOUS; SUBCUTANEOUS at 12:20

## 2023-01-01 RX ADMIN — SODIUM CHLORIDE 4 MILLILITER(S): 9 INJECTION INTRAMUSCULAR; INTRAVENOUS; SUBCUTANEOUS at 21:43

## 2023-01-01 RX ADMIN — MIDODRINE HYDROCHLORIDE 40 MILLIGRAM(S): 2.5 TABLET ORAL at 17:17

## 2023-01-01 RX ADMIN — Medication 6.23 MICROGRAM(S)/KG/MIN: at 20:43

## 2023-01-01 RX ADMIN — Medication 1 APPLICATION(S): at 05:20

## 2023-01-01 RX ADMIN — FLUCONAZOLE 100 MILLIGRAM(S): 150 TABLET ORAL at 20:58

## 2023-01-01 RX ADMIN — Medication 4: at 12:21

## 2023-01-01 RX ADMIN — FENTANYL CITRATE 50 MICROGRAM(S): 50 INJECTION INTRAVENOUS at 01:10

## 2023-01-01 RX ADMIN — Medication 1 DROP(S): at 13:00

## 2023-01-01 RX ADMIN — PANTOPRAZOLE SODIUM 40 MILLIGRAM(S): 20 TABLET, DELAYED RELEASE ORAL at 17:44

## 2023-01-01 RX ADMIN — HUMAN INSULIN 6 UNIT(S): 100 INJECTION, SUSPENSION SUBCUTANEOUS at 18:15

## 2023-01-01 RX ADMIN — Medication 3 MILLILITER(S): at 15:09

## 2023-01-01 RX ADMIN — Medication 4: at 11:14

## 2023-01-01 RX ADMIN — Medication 1 APPLICATION(S): at 17:21

## 2023-01-01 RX ADMIN — HUMAN INSULIN 6 UNIT(S): 100 INJECTION, SUSPENSION SUBCUTANEOUS at 17:31

## 2023-01-01 RX ADMIN — HEPARIN SODIUM 5000 UNIT(S): 5000 INJECTION INTRAVENOUS; SUBCUTANEOUS at 21:29

## 2023-01-01 RX ADMIN — Medication 300 MILLIGRAM(S): at 12:03

## 2023-01-01 RX ADMIN — SODIUM CHLORIDE 2 GRAM(S): 9 INJECTION INTRAMUSCULAR; INTRAVENOUS; SUBCUTANEOUS at 05:47

## 2023-01-01 RX ADMIN — Medication 600 MILLIGRAM(S): at 21:54

## 2023-01-01 RX ADMIN — Medication 1 DROP(S): at 11:58

## 2023-01-01 RX ADMIN — HUMAN INSULIN 11 UNIT(S): 100 INJECTION, SUSPENSION SUBCUTANEOUS at 11:58

## 2023-01-01 RX ADMIN — MIDODRINE HYDROCHLORIDE 30 MILLIGRAM(S): 2.5 TABLET ORAL at 15:17

## 2023-01-01 RX ADMIN — Medication 1 APPLICATION(S): at 12:17

## 2023-01-01 RX ADMIN — Medication 7 UNIT(S): at 16:41

## 2023-01-01 RX ADMIN — FENTANYL CITRATE 200 MICROGRAM(S): 50 INJECTION INTRAVENOUS at 10:51

## 2023-01-01 RX ADMIN — Medication 25 MILLIGRAM(S): at 05:25

## 2023-01-01 RX ADMIN — APIXABAN 5 MILLIGRAM(S): 2.5 TABLET, FILM COATED ORAL at 17:12

## 2023-01-01 RX ADMIN — ALTEPLASE 2 MILLIGRAM(S): KIT at 03:28

## 2023-01-01 RX ADMIN — Medication 3 MILLILITER(S): at 22:50

## 2023-01-01 RX ADMIN — Medication 3 MILLIGRAM(S): at 23:03

## 2023-01-01 RX ADMIN — Medication 1 DROP(S): at 15:11

## 2023-01-01 RX ADMIN — Medication 300 MILLIGRAM(S): at 11:27

## 2023-01-01 RX ADMIN — PANTOPRAZOLE SODIUM 40 MILLIGRAM(S): 20 TABLET, DELAYED RELEASE ORAL at 05:44

## 2023-01-01 RX ADMIN — Medication 1 DROP(S): at 16:58

## 2023-01-01 RX ADMIN — APIXABAN 5 MILLIGRAM(S): 2.5 TABLET, FILM COATED ORAL at 05:02

## 2023-01-01 RX ADMIN — Medication 1 PACKET(S): at 11:27

## 2023-01-01 RX ADMIN — HUMAN INSULIN 4 UNIT(S): 100 INJECTION, SUSPENSION SUBCUTANEOUS at 00:36

## 2023-01-01 RX ADMIN — Medication 81 MILLIGRAM(S): at 12:06

## 2023-01-01 RX ADMIN — CHLORHEXIDINE GLUCONATE 15 MILLILITER(S): 213 SOLUTION TOPICAL at 05:31

## 2023-01-01 RX ADMIN — SODIUM CHLORIDE 4 MILLILITER(S): 9 INJECTION INTRAMUSCULAR; INTRAVENOUS; SUBCUTANEOUS at 22:49

## 2023-01-01 RX ADMIN — CARVEDILOL PHOSPHATE 37.5 MILLIGRAM(S): 80 CAPSULE, EXTENDED RELEASE ORAL at 17:27

## 2023-01-01 RX ADMIN — Medication 4.99 MICROGRAM(S)/KG/MIN: at 00:59

## 2023-01-01 RX ADMIN — PANTOPRAZOLE SODIUM 40 MILLIGRAM(S): 20 TABLET, DELAYED RELEASE ORAL at 05:43

## 2023-01-01 RX ADMIN — SODIUM CHLORIDE 2 GRAM(S): 9 INJECTION INTRAMUSCULAR; INTRAVENOUS; SUBCUTANEOUS at 17:59

## 2023-01-01 RX ADMIN — HUMAN INSULIN 6 UNIT(S): 100 INJECTION, SUSPENSION SUBCUTANEOUS at 00:11

## 2023-01-01 RX ADMIN — CHLORHEXIDINE GLUCONATE 15 MILLILITER(S): 213 SOLUTION TOPICAL at 16:57

## 2023-01-01 RX ADMIN — SODIUM CHLORIDE 4 MILLILITER(S): 9 INJECTION INTRAMUSCULAR; INTRAVENOUS; SUBCUTANEOUS at 16:43

## 2023-01-01 RX ADMIN — LEVETIRACETAM 1000 MILLIGRAM(S): 250 TABLET, FILM COATED ORAL at 13:06

## 2023-01-01 RX ADMIN — MIDODRINE HYDROCHLORIDE 40 MILLIGRAM(S): 2.5 TABLET ORAL at 06:04

## 2023-01-01 RX ADMIN — HUMAN INSULIN 3 UNIT(S): 100 INJECTION, SUSPENSION SUBCUTANEOUS at 17:17

## 2023-01-01 RX ADMIN — SODIUM CHLORIDE 4 MILLILITER(S): 9 INJECTION INTRAMUSCULAR; INTRAVENOUS; SUBCUTANEOUS at 21:47

## 2023-01-01 RX ADMIN — SODIUM CHLORIDE 1 GRAM(S): 9 INJECTION INTRAMUSCULAR; INTRAVENOUS; SUBCUTANEOUS at 05:07

## 2023-01-01 RX ADMIN — Medication 6: at 09:00

## 2023-01-01 RX ADMIN — APIXABAN 5 MILLIGRAM(S): 2.5 TABLET, FILM COATED ORAL at 17:40

## 2023-01-01 RX ADMIN — MIDODRINE HYDROCHLORIDE 40 MILLIGRAM(S): 2.5 TABLET ORAL at 11:12

## 2023-01-01 RX ADMIN — CHLORHEXIDINE GLUCONATE 1 APPLICATION(S): 213 SOLUTION TOPICAL at 05:16

## 2023-01-01 RX ADMIN — MIDODRINE HYDROCHLORIDE 20 MILLIGRAM(S): 2.5 TABLET ORAL at 21:45

## 2023-01-01 RX ADMIN — PANTOPRAZOLE SODIUM 40 MILLIGRAM(S): 20 TABLET, DELAYED RELEASE ORAL at 07:00

## 2023-01-01 RX ADMIN — Medication 2: at 17:47

## 2023-01-01 RX ADMIN — Medication 3 MILLILITER(S): at 16:06

## 2023-01-01 RX ADMIN — SODIUM CHLORIDE 4 MILLILITER(S): 9 INJECTION INTRAMUSCULAR; INTRAVENOUS; SUBCUTANEOUS at 10:04

## 2023-01-01 RX ADMIN — Medication 1 APPLICATION(S): at 05:08

## 2023-01-01 RX ADMIN — HUMAN INSULIN 4 UNIT(S): 100 INJECTION, SUSPENSION SUBCUTANEOUS at 17:30

## 2023-01-01 RX ADMIN — MIDODRINE HYDROCHLORIDE 40 MILLIGRAM(S): 2.5 TABLET ORAL at 05:26

## 2023-01-01 RX ADMIN — Medication 4: at 17:52

## 2023-01-01 RX ADMIN — Medication 2: at 23:56

## 2023-01-01 RX ADMIN — CHLORHEXIDINE GLUCONATE 15 MILLILITER(S): 213 SOLUTION TOPICAL at 17:57

## 2023-01-01 RX ADMIN — HUMAN INSULIN 4 UNIT(S): 100 INJECTION, SUSPENSION SUBCUTANEOUS at 13:59

## 2023-01-01 RX ADMIN — Medication 4: at 13:21

## 2023-01-01 RX ADMIN — Medication 4: at 11:17

## 2023-01-01 RX ADMIN — Medication 1 APPLICATION(S): at 17:35

## 2023-01-01 RX ADMIN — ATORVASTATIN CALCIUM 10 MILLIGRAM(S): 80 TABLET, FILM COATED ORAL at 22:25

## 2023-01-01 RX ADMIN — DROXIDOPA 600 MILLIGRAM(S): 100 CAPSULE ORAL at 17:23

## 2023-01-01 RX ADMIN — HUMAN INSULIN 12 UNIT(S): 100 INJECTION, SUSPENSION SUBCUTANEOUS at 00:53

## 2023-01-01 RX ADMIN — HUMAN INSULIN 6 UNIT(S): 100 INJECTION, SUSPENSION SUBCUTANEOUS at 00:24

## 2023-01-01 RX ADMIN — Medication 3 MILLILITER(S): at 15:24

## 2023-01-01 RX ADMIN — Medication 6 UNIT(S): at 11:59

## 2023-01-01 RX ADMIN — Medication 1 DROP(S): at 11:28

## 2023-01-01 RX ADMIN — DROXIDOPA 600 MILLIGRAM(S): 100 CAPSULE ORAL at 06:05

## 2023-01-01 RX ADMIN — Medication 3 MILLILITER(S): at 03:16

## 2023-01-01 RX ADMIN — SEVELAMER CARBONATE 1600 MILLIGRAM(S): 2400 POWDER, FOR SUSPENSION ORAL at 06:03

## 2023-01-01 RX ADMIN — PANTOPRAZOLE SODIUM 40 MILLIGRAM(S): 20 TABLET, DELAYED RELEASE ORAL at 05:33

## 2023-01-01 RX ADMIN — HUMAN INSULIN 5 UNIT(S): 100 INJECTION, SUSPENSION SUBCUTANEOUS at 23:04

## 2023-01-01 RX ADMIN — Medication 1 SPRAY(S): at 05:39

## 2023-01-01 RX ADMIN — Medication 1 APPLICATION(S): at 18:05

## 2023-01-01 RX ADMIN — Medication 3 MILLILITER(S): at 03:47

## 2023-01-01 RX ADMIN — HEPARIN SODIUM 5000 UNIT(S): 5000 INJECTION INTRAVENOUS; SUBCUTANEOUS at 13:58

## 2023-01-01 RX ADMIN — CHLORHEXIDINE GLUCONATE 1 APPLICATION(S): 213 SOLUTION TOPICAL at 05:13

## 2023-01-01 RX ADMIN — Medication 2: at 05:56

## 2023-01-01 RX ADMIN — Medication 6.23 MICROGRAM(S)/KG/MIN: at 10:59

## 2023-01-01 RX ADMIN — HEPARIN SODIUM 5000 UNIT(S): 5000 INJECTION INTRAVENOUS; SUBCUTANEOUS at 21:48

## 2023-01-01 RX ADMIN — MIDODRINE HYDROCHLORIDE 40 MILLIGRAM(S): 2.5 TABLET ORAL at 11:37

## 2023-01-01 RX ADMIN — SODIUM CHLORIDE 1 GRAM(S): 9 INJECTION INTRAMUSCULAR; INTRAVENOUS; SUBCUTANEOUS at 17:52

## 2023-01-01 RX ADMIN — DROXIDOPA 600 MILLIGRAM(S): 100 CAPSULE ORAL at 10:10

## 2023-01-01 RX ADMIN — HUMAN INSULIN 3 UNIT(S): 100 INJECTION, SUSPENSION SUBCUTANEOUS at 23:48

## 2023-01-01 RX ADMIN — HUMAN INSULIN 3 UNIT(S): 100 INJECTION, SUSPENSION SUBCUTANEOUS at 12:11

## 2023-01-01 RX ADMIN — Medication 6: at 18:27

## 2023-01-01 RX ADMIN — APIXABAN 10 MILLIGRAM(S): 2.5 TABLET, FILM COATED ORAL at 05:28

## 2023-01-01 RX ADMIN — ERYTHROPOIETIN 10000 UNIT(S): 10000 INJECTION, SOLUTION INTRAVENOUS; SUBCUTANEOUS at 12:08

## 2023-01-01 RX ADMIN — CHLORHEXIDINE GLUCONATE 1 APPLICATION(S): 213 SOLUTION TOPICAL at 05:44

## 2023-01-01 RX ADMIN — PROPOFOL 3.99 MICROGRAM(S)/KG/MIN: 10 INJECTION, EMULSION INTRAVENOUS at 20:43

## 2023-01-01 RX ADMIN — PANTOPRAZOLE SODIUM 40 MILLIGRAM(S): 20 TABLET, DELAYED RELEASE ORAL at 05:38

## 2023-01-01 RX ADMIN — Medication 6: at 23:40

## 2023-01-01 RX ADMIN — PANTOPRAZOLE SODIUM 40 MILLIGRAM(S): 20 TABLET, DELAYED RELEASE ORAL at 17:45

## 2023-01-01 RX ADMIN — HUMAN INSULIN 4 UNIT(S): 100 INJECTION, SUSPENSION SUBCUTANEOUS at 00:24

## 2023-01-01 RX ADMIN — PANTOPRAZOLE SODIUM 40 MILLIGRAM(S): 20 TABLET, DELAYED RELEASE ORAL at 17:02

## 2023-01-01 RX ADMIN — ALBUTEROL 2.5 MILLIGRAM(S): 90 AEROSOL, METERED ORAL at 21:22

## 2023-01-01 RX ADMIN — Medication 250 MILLIGRAM(S): at 12:29

## 2023-01-01 RX ADMIN — Medication 1 DROP(S): at 05:39

## 2023-01-01 RX ADMIN — Medication 3 MILLILITER(S): at 23:11

## 2023-01-01 RX ADMIN — SEVELAMER CARBONATE 1600 MILLIGRAM(S): 2400 POWDER, FOR SUSPENSION ORAL at 12:22

## 2023-01-01 RX ADMIN — PANTOPRAZOLE SODIUM 40 MILLIGRAM(S): 20 TABLET, DELAYED RELEASE ORAL at 17:10

## 2023-01-01 RX ADMIN — DROXIDOPA 600 MILLIGRAM(S): 100 CAPSULE ORAL at 05:32

## 2023-01-01 RX ADMIN — Medication 3 MILLILITER(S): at 09:25

## 2023-01-01 RX ADMIN — Medication 1 APPLICATION(S): at 05:24

## 2023-01-01 RX ADMIN — DEXMEDETOMIDINE HYDROCHLORIDE IN 0.9% SODIUM CHLORIDE 1.66 MICROGRAM(S)/KG/HR: 4 INJECTION INTRAVENOUS at 09:50

## 2023-01-01 RX ADMIN — MIDODRINE HYDROCHLORIDE 40 MILLIGRAM(S): 2.5 TABLET ORAL at 17:07

## 2023-01-01 RX ADMIN — MIDODRINE HYDROCHLORIDE 20 MILLIGRAM(S): 2.5 TABLET ORAL at 06:26

## 2023-01-01 RX ADMIN — Medication 6: at 00:17

## 2023-01-01 RX ADMIN — Medication 1 DROP(S): at 19:47

## 2023-01-01 RX ADMIN — LEVETIRACETAM 1000 MILLIGRAM(S): 250 TABLET, FILM COATED ORAL at 11:46

## 2023-01-01 RX ADMIN — Medication 50 MILLIGRAM(S): at 14:36

## 2023-01-01 RX ADMIN — Medication 1 APPLICATION(S): at 19:09

## 2023-01-01 RX ADMIN — Medication 1 DROP(S): at 08:00

## 2023-01-01 RX ADMIN — Medication 400 MILLIGRAM(S): at 02:11

## 2023-01-01 RX ADMIN — Medication 300 MILLIGRAM(S): at 11:24

## 2023-01-01 RX ADMIN — Medication 1 APPLICATION(S): at 05:36

## 2023-01-01 RX ADMIN — HUMAN INSULIN 11 UNIT(S): 100 INJECTION, SUSPENSION SUBCUTANEOUS at 23:39

## 2023-01-01 RX ADMIN — SODIUM CHLORIDE 3 MILLILITER(S): 9 INJECTION INTRAMUSCULAR; INTRAVENOUS; SUBCUTANEOUS at 12:01

## 2023-01-01 RX ADMIN — HEPARIN SODIUM 5000 UNIT(S): 5000 INJECTION INTRAVENOUS; SUBCUTANEOUS at 05:27

## 2023-01-01 RX ADMIN — ATORVASTATIN CALCIUM 40 MILLIGRAM(S): 80 TABLET, FILM COATED ORAL at 22:26

## 2023-01-01 RX ADMIN — MIDODRINE HYDROCHLORIDE 40 MILLIGRAM(S): 2.5 TABLET ORAL at 14:44

## 2023-01-01 RX ADMIN — Medication 1 APPLICATION(S): at 18:38

## 2023-01-01 RX ADMIN — Medication 6 MICROGRAM(S)/MIN: at 01:21

## 2023-01-01 RX ADMIN — MIDODRINE HYDROCHLORIDE 30 MILLIGRAM(S): 2.5 TABLET ORAL at 21:55

## 2023-01-01 RX ADMIN — HUMAN INSULIN 3 UNIT(S): 100 INJECTION, SUSPENSION SUBCUTANEOUS at 00:02

## 2023-01-01 RX ADMIN — ERYTHROPOIETIN 10000 UNIT(S): 10000 INJECTION, SOLUTION INTRAVENOUS; SUBCUTANEOUS at 13:30

## 2023-01-01 RX ADMIN — HUMAN INSULIN 3 UNIT(S): 100 INJECTION, SUSPENSION SUBCUTANEOUS at 17:33

## 2023-01-01 RX ADMIN — BUMETANIDE 2 MILLIGRAM(S): 0.25 INJECTION INTRAMUSCULAR; INTRAVENOUS at 15:29

## 2023-01-01 RX ADMIN — DROXIDOPA 600 MILLIGRAM(S): 100 CAPSULE ORAL at 05:35

## 2023-01-01 RX ADMIN — PANTOPRAZOLE SODIUM 40 MILLIGRAM(S): 20 TABLET, DELAYED RELEASE ORAL at 18:52

## 2023-01-01 RX ADMIN — SODIUM CHLORIDE 4 MILLILITER(S): 9 INJECTION INTRAMUSCULAR; INTRAVENOUS; SUBCUTANEOUS at 09:20

## 2023-01-01 RX ADMIN — CHLORHEXIDINE GLUCONATE 1 APPLICATION(S): 213 SOLUTION TOPICAL at 05:29

## 2023-01-01 RX ADMIN — Medication 1 PACKET(S): at 11:50

## 2023-01-01 RX ADMIN — SODIUM CHLORIDE 1 GRAM(S): 9 INJECTION INTRAMUSCULAR; INTRAVENOUS; SUBCUTANEOUS at 17:37

## 2023-01-01 RX ADMIN — DROXIDOPA 600 MILLIGRAM(S): 100 CAPSULE ORAL at 10:28

## 2023-01-01 RX ADMIN — Medication 1 APPLICATION(S): at 17:20

## 2023-01-01 RX ADMIN — Medication 4 DROP(S): at 17:30

## 2023-01-01 RX ADMIN — Medication 3 MILLILITER(S): at 22:06

## 2023-01-01 RX ADMIN — Medication 2: at 05:46

## 2023-01-01 RX ADMIN — HUMAN INSULIN 3 UNIT(S): 100 INJECTION, SUSPENSION SUBCUTANEOUS at 23:56

## 2023-01-01 RX ADMIN — Medication 3 MILLILITER(S): at 11:03

## 2023-01-01 RX ADMIN — SODIUM ZIRCONIUM CYCLOSILICATE 5 GRAM(S): 10 POWDER, FOR SUSPENSION ORAL at 12:00

## 2023-01-01 RX ADMIN — Medication 1 DROP(S): at 21:19

## 2023-01-01 RX ADMIN — Medication 2: at 17:46

## 2023-01-01 RX ADMIN — Medication 1 DROP(S): at 05:19

## 2023-01-01 RX ADMIN — Medication 100 MILLIGRAM(S): at 18:06

## 2023-01-01 RX ADMIN — Medication 1 APPLICATION(S): at 10:45

## 2023-01-01 RX ADMIN — SODIUM CHLORIDE 1 GRAM(S): 9 INJECTION INTRAMUSCULAR; INTRAVENOUS; SUBCUTANEOUS at 18:22

## 2023-01-01 RX ADMIN — Medication 2: at 00:23

## 2023-01-01 RX ADMIN — SODIUM CHLORIDE 4 MILLILITER(S): 9 INJECTION INTRAMUSCULAR; INTRAVENOUS; SUBCUTANEOUS at 10:49

## 2023-01-01 RX ADMIN — CHLORHEXIDINE GLUCONATE 15 MILLILITER(S): 213 SOLUTION TOPICAL at 17:30

## 2023-01-01 RX ADMIN — BUMETANIDE 2 MILLIGRAM(S): 0.25 INJECTION INTRAMUSCULAR; INTRAVENOUS at 17:27

## 2023-01-01 RX ADMIN — APIXABAN 5 MILLIGRAM(S): 2.5 TABLET, FILM COATED ORAL at 06:06

## 2023-01-01 RX ADMIN — Medication 1 APPLICATION(S): at 12:38

## 2023-01-01 RX ADMIN — SODIUM CHLORIDE 4 MILLILITER(S): 9 INJECTION INTRAMUSCULAR; INTRAVENOUS; SUBCUTANEOUS at 16:33

## 2023-01-01 RX ADMIN — Medication 1 DROP(S): at 11:29

## 2023-01-01 RX ADMIN — Medication 400 MILLIGRAM(S): at 06:24

## 2023-01-01 RX ADMIN — LETROZOLE 2.5 MILLIGRAM(S): 2.5 TABLET, FILM COATED ORAL at 11:50

## 2023-01-01 RX ADMIN — HEPARIN SODIUM 9.5 UNIT(S)/HR: 5000 INJECTION INTRAVENOUS; SUBCUTANEOUS at 18:01

## 2023-01-01 RX ADMIN — Medication 3 MILLILITER(S): at 15:56

## 2023-01-01 RX ADMIN — Medication 1 APPLICATION(S): at 06:10

## 2023-01-01 RX ADMIN — MIDODRINE HYDROCHLORIDE 30 MILLIGRAM(S): 2.5 TABLET ORAL at 13:28

## 2023-01-01 RX ADMIN — APIXABAN 10 MILLIGRAM(S): 2.5 TABLET, FILM COATED ORAL at 05:54

## 2023-01-01 RX ADMIN — Medication 2: at 12:36

## 2023-01-01 RX ADMIN — CHLORHEXIDINE GLUCONATE 1 APPLICATION(S): 213 SOLUTION TOPICAL at 06:15

## 2023-01-01 RX ADMIN — APIXABAN 5 MILLIGRAM(S): 2.5 TABLET, FILM COATED ORAL at 05:06

## 2023-01-01 RX ADMIN — Medication 6 MILLIGRAM(S): at 20:49

## 2023-01-01 RX ADMIN — Medication 4.99 MICROGRAM(S)/KG/MIN: at 12:40

## 2023-01-01 RX ADMIN — Medication 1 APPLICATION(S): at 23:38

## 2023-01-01 RX ADMIN — Medication 3 MILLILITER(S): at 09:38

## 2023-01-01 RX ADMIN — CHLORHEXIDINE GLUCONATE 1 APPLICATION(S): 213 SOLUTION TOPICAL at 05:07

## 2023-01-01 RX ADMIN — Medication 3 MILLILITER(S): at 15:39

## 2023-01-01 RX ADMIN — HUMAN INSULIN 6 UNIT(S): 100 INJECTION, SUSPENSION SUBCUTANEOUS at 13:06

## 2023-01-01 RX ADMIN — HUMAN INSULIN 4 UNIT(S): 100 INJECTION, SUSPENSION SUBCUTANEOUS at 06:26

## 2023-01-01 RX ADMIN — Medication 81 MILLIGRAM(S): at 12:12

## 2023-01-01 RX ADMIN — SODIUM CHLORIDE 2 GRAM(S): 9 INJECTION INTRAMUSCULAR; INTRAVENOUS; SUBCUTANEOUS at 17:19

## 2023-01-01 RX ADMIN — CHLORHEXIDINE GLUCONATE 15 MILLILITER(S): 213 SOLUTION TOPICAL at 17:47

## 2023-01-01 RX ADMIN — DEXMEDETOMIDINE HYDROCHLORIDE IN 0.9% SODIUM CHLORIDE 16.6 MICROGRAM(S)/KG/HR: 4 INJECTION INTRAVENOUS at 08:13

## 2023-01-01 RX ADMIN — ATORVASTATIN CALCIUM 10 MILLIGRAM(S): 80 TABLET, FILM COATED ORAL at 21:49

## 2023-01-01 RX ADMIN — NICARDIPINE HYDROCHLORIDE 25 MG/HR: 30 CAPSULE, EXTENDED RELEASE ORAL at 21:32

## 2023-01-01 RX ADMIN — SODIUM CHLORIDE 1 GRAM(S): 9 INJECTION INTRAMUSCULAR; INTRAVENOUS; SUBCUTANEOUS at 05:42

## 2023-01-01 RX ADMIN — ATORVASTATIN CALCIUM 10 MILLIGRAM(S): 80 TABLET, FILM COATED ORAL at 22:46

## 2023-01-01 RX ADMIN — HUMAN INSULIN 3 UNIT(S): 100 INJECTION, SUSPENSION SUBCUTANEOUS at 05:44

## 2023-01-01 RX ADMIN — Medication 1 APPLICATION(S): at 07:03

## 2023-01-01 RX ADMIN — PANTOPRAZOLE SODIUM 40 MILLIGRAM(S): 20 TABLET, DELAYED RELEASE ORAL at 05:32

## 2023-01-01 RX ADMIN — APIXABAN 5 MILLIGRAM(S): 2.5 TABLET, FILM COATED ORAL at 06:13

## 2023-01-01 RX ADMIN — Medication 1 PACKET(S): at 03:18

## 2023-01-01 RX ADMIN — CHLORHEXIDINE GLUCONATE 15 MILLILITER(S): 213 SOLUTION TOPICAL at 05:36

## 2023-01-01 RX ADMIN — Medication 81 MILLIGRAM(S): at 10:33

## 2023-01-01 RX ADMIN — Medication 3 MILLILITER(S): at 20:25

## 2023-01-01 RX ADMIN — ERYTHROPOIETIN 10000 UNIT(S): 10000 INJECTION, SOLUTION INTRAVENOUS; SUBCUTANEOUS at 17:57

## 2023-01-01 RX ADMIN — Medication 1 APPLICATION(S): at 18:13

## 2023-01-01 RX ADMIN — SODIUM CHLORIDE 4 MILLILITER(S): 9 INJECTION INTRAMUSCULAR; INTRAVENOUS; SUBCUTANEOUS at 16:21

## 2023-01-01 RX ADMIN — Medication 2: at 11:41

## 2023-01-01 RX ADMIN — CHLORHEXIDINE GLUCONATE 15 MILLILITER(S): 213 SOLUTION TOPICAL at 17:17

## 2023-01-01 RX ADMIN — HUMAN INSULIN 11 UNIT(S): 100 INJECTION, SUSPENSION SUBCUTANEOUS at 17:30

## 2023-01-01 RX ADMIN — Medication 4: at 05:14

## 2023-01-01 RX ADMIN — BUMETANIDE 2 MILLIGRAM(S): 0.25 INJECTION INTRAMUSCULAR; INTRAVENOUS at 09:51

## 2023-01-01 RX ADMIN — Medication 1 PACKET(S): at 05:17

## 2023-01-01 RX ADMIN — HUMAN INSULIN 3 UNIT(S): 100 INJECTION, SUSPENSION SUBCUTANEOUS at 13:57

## 2023-01-01 RX ADMIN — Medication 650 MILLIGRAM(S): at 13:56

## 2023-01-01 RX ADMIN — Medication 0.2 MILLIGRAM(S): at 18:14

## 2023-01-01 RX ADMIN — DROXIDOPA 600 MILLIGRAM(S): 100 CAPSULE ORAL at 22:27

## 2023-01-01 RX ADMIN — Medication 6 MICROGRAM(S)/MIN: at 08:35

## 2023-01-01 RX ADMIN — INSULIN GLARGINE 18 UNIT(S): 100 INJECTION, SOLUTION SUBCUTANEOUS at 21:46

## 2023-01-01 RX ADMIN — Medication 6: at 00:49

## 2023-01-01 RX ADMIN — Medication 1 APPLICATION(S): at 00:31

## 2023-01-01 RX ADMIN — CHLORHEXIDINE GLUCONATE 15 MILLILITER(S): 213 SOLUTION TOPICAL at 18:16

## 2023-01-01 RX ADMIN — SODIUM CHLORIDE 4 MILLILITER(S): 9 INJECTION INTRAMUSCULAR; INTRAVENOUS; SUBCUTANEOUS at 20:39

## 2023-01-01 RX ADMIN — HEPARIN SODIUM 5000 UNIT(S): 5000 INJECTION INTRAVENOUS; SUBCUTANEOUS at 22:44

## 2023-01-01 RX ADMIN — MIDODRINE HYDROCHLORIDE 10 MILLIGRAM(S): 2.5 TABLET ORAL at 05:53

## 2023-01-01 RX ADMIN — CHLORHEXIDINE GLUCONATE 1 APPLICATION(S): 213 SOLUTION TOPICAL at 06:03

## 2023-01-01 RX ADMIN — SODIUM CHLORIDE 4 MILLILITER(S): 9 INJECTION INTRAMUSCULAR; INTRAVENOUS; SUBCUTANEOUS at 04:54

## 2023-01-01 RX ADMIN — MIDODRINE HYDROCHLORIDE 40 MILLIGRAM(S): 2.5 TABLET ORAL at 17:40

## 2023-01-01 RX ADMIN — CARVEDILOL PHOSPHATE 37.5 MILLIGRAM(S): 80 CAPSULE, EXTENDED RELEASE ORAL at 17:37

## 2023-01-01 RX ADMIN — Medication 3 MILLILITER(S): at 03:18

## 2023-01-01 RX ADMIN — MIDODRINE HYDROCHLORIDE 30 MILLIGRAM(S): 2.5 TABLET ORAL at 13:24

## 2023-01-01 RX ADMIN — CEFEPIME 1000 MILLIGRAM(S): 1 INJECTION, POWDER, FOR SOLUTION INTRAMUSCULAR; INTRAVENOUS at 15:31

## 2023-01-01 RX ADMIN — Medication 1 DROP(S): at 13:01

## 2023-01-01 RX ADMIN — MUPIROCIN 1 APPLICATION(S): 20 OINTMENT TOPICAL at 18:06

## 2023-01-01 RX ADMIN — HUMAN INSULIN 5 UNIT(S): 100 INJECTION, SUSPENSION SUBCUTANEOUS at 12:22

## 2023-01-01 RX ADMIN — MIDODRINE HYDROCHLORIDE 30 MILLIGRAM(S): 2.5 TABLET ORAL at 16:29

## 2023-01-01 RX ADMIN — PROPOFOL 3.99 MICROGRAM(S)/KG/MIN: 10 INJECTION, EMULSION INTRAVENOUS at 12:40

## 2023-01-01 RX ADMIN — Medication 4: at 17:27

## 2023-01-01 RX ADMIN — HYDROMORPHONE HYDROCHLORIDE 0.5 MILLIGRAM(S): 2 INJECTION INTRAMUSCULAR; INTRAVENOUS; SUBCUTANEOUS at 15:15

## 2023-01-01 RX ADMIN — SODIUM CHLORIDE 1 GRAM(S): 9 INJECTION INTRAMUSCULAR; INTRAVENOUS; SUBCUTANEOUS at 17:21

## 2023-01-01 RX ADMIN — HUMAN INSULIN 11 UNIT(S): 100 INJECTION, SUSPENSION SUBCUTANEOUS at 23:49

## 2023-01-01 RX ADMIN — HEPARIN SODIUM 8.5 UNIT(S)/HR: 5000 INJECTION INTRAVENOUS; SUBCUTANEOUS at 04:02

## 2023-01-01 RX ADMIN — Medication 3 MILLILITER(S): at 23:33

## 2023-01-01 RX ADMIN — DROXIDOPA 600 MILLIGRAM(S): 100 CAPSULE ORAL at 22:31

## 2023-01-01 RX ADMIN — SODIUM CHLORIDE 4 MILLILITER(S): 9 INJECTION INTRAMUSCULAR; INTRAVENOUS; SUBCUTANEOUS at 10:27

## 2023-01-01 RX ADMIN — PANTOPRAZOLE SODIUM 40 MILLIGRAM(S): 20 TABLET, DELAYED RELEASE ORAL at 05:45

## 2023-01-01 RX ADMIN — Medication 2: at 09:05

## 2023-01-01 RX ADMIN — SODIUM CHLORIDE 4 MILLILITER(S): 9 INJECTION INTRAMUSCULAR; INTRAVENOUS; SUBCUTANEOUS at 15:12

## 2023-01-01 RX ADMIN — MIDODRINE HYDROCHLORIDE 40 MILLIGRAM(S): 2.5 TABLET ORAL at 23:17

## 2023-01-01 RX ADMIN — Medication 4: at 06:08

## 2023-01-01 RX ADMIN — FENTANYL CITRATE 3.76 MICROGRAM(S)/KG/HR: 50 INJECTION INTRAVENOUS at 00:59

## 2023-01-01 RX ADMIN — ATORVASTATIN CALCIUM 40 MILLIGRAM(S): 80 TABLET, FILM COATED ORAL at 21:03

## 2023-01-01 RX ADMIN — CHLORHEXIDINE GLUCONATE 1 APPLICATION(S): 213 SOLUTION TOPICAL at 05:43

## 2023-01-01 RX ADMIN — Medication 0.2 MILLIGRAM(S): at 10:14

## 2023-01-01 RX ADMIN — Medication 3 MILLILITER(S): at 20:21

## 2023-01-01 RX ADMIN — MUPIROCIN 1 APPLICATION(S): 20 OINTMENT TOPICAL at 06:30

## 2023-01-01 RX ADMIN — CARVEDILOL PHOSPHATE 37.5 MILLIGRAM(S): 80 CAPSULE, EXTENDED RELEASE ORAL at 05:23

## 2023-01-01 RX ADMIN — HEPARIN SODIUM 5000 UNIT(S): 5000 INJECTION INTRAVENOUS; SUBCUTANEOUS at 13:56

## 2023-01-01 RX ADMIN — FENTANYL CITRATE 50 MICROGRAM(S): 50 INJECTION INTRAVENOUS at 17:25

## 2023-01-01 RX ADMIN — Medication 400 MILLIGRAM(S): at 09:20

## 2023-01-01 RX ADMIN — HUMAN INSULIN 3 UNIT(S): 100 INJECTION, SUSPENSION SUBCUTANEOUS at 18:17

## 2023-01-01 RX ADMIN — Medication 1 APPLICATION(S): at 23:59

## 2023-01-01 RX ADMIN — PANTOPRAZOLE SODIUM 40 MILLIGRAM(S): 20 TABLET, DELAYED RELEASE ORAL at 06:09

## 2023-01-01 RX ADMIN — ALBUTEROL 2.5 MILLIGRAM(S): 90 AEROSOL, METERED ORAL at 22:47

## 2023-01-01 RX ADMIN — Medication 1 APPLICATION(S): at 11:35

## 2023-01-01 RX ADMIN — Medication 1 DROP(S): at 13:50

## 2023-01-01 RX ADMIN — Medication 2: at 17:37

## 2023-01-01 RX ADMIN — HUMAN INSULIN 1 UNIT(S): 100 INJECTION, SUSPENSION SUBCUTANEOUS at 17:36

## 2023-01-01 RX ADMIN — Medication 81 MILLIGRAM(S): at 12:40

## 2023-01-01 RX ADMIN — DROXIDOPA 600 MILLIGRAM(S): 100 CAPSULE ORAL at 11:23

## 2023-01-01 RX ADMIN — HUMAN INSULIN 6 UNIT(S): 100 INJECTION, SUSPENSION SUBCUTANEOUS at 23:18

## 2023-01-01 RX ADMIN — Medication 1 APPLICATION(S): at 17:34

## 2023-01-01 RX ADMIN — DIPHENHYDRAMINE HYDROCHLORIDE AND LIDOCAINE HYDROCHLORIDE AND ALUMINUM HYDROXIDE AND MAGNESIUM HYDRO 10 MILLILITER(S): KIT at 12:23

## 2023-01-01 RX ADMIN — Medication 40 MILLIGRAM(S): at 06:35

## 2023-01-01 RX ADMIN — Medication 1 DROP(S): at 06:34

## 2023-01-01 RX ADMIN — MIDODRINE HYDROCHLORIDE 40 MILLIGRAM(S): 2.5 TABLET ORAL at 18:17

## 2023-01-01 RX ADMIN — Medication 40 MILLIGRAM(S): at 05:50

## 2023-01-01 RX ADMIN — HUMAN INSULIN 6 UNIT(S): 100 INJECTION, SUSPENSION SUBCUTANEOUS at 00:44

## 2023-01-01 RX ADMIN — Medication 1 APPLICATION(S): at 18:17

## 2023-01-01 RX ADMIN — DIPHENHYDRAMINE HYDROCHLORIDE AND LIDOCAINE HYDROCHLORIDE AND ALUMINUM HYDROXIDE AND MAGNESIUM HYDRO 10 MILLILITER(S): KIT at 18:01

## 2023-01-01 RX ADMIN — Medication 2: at 11:26

## 2023-01-01 RX ADMIN — APIXABAN 10 MILLIGRAM(S): 2.5 TABLET, FILM COATED ORAL at 17:44

## 2023-01-01 RX ADMIN — FENTANYL CITRATE 50 MICROGRAM(S): 50 INJECTION INTRAVENOUS at 14:10

## 2023-01-01 RX ADMIN — Medication 1 APPLICATION(S): at 12:07

## 2023-01-01 RX ADMIN — Medication 3 MILLILITER(S): at 09:28

## 2023-01-01 RX ADMIN — Medication 6.23 MICROGRAM(S)/KG/MIN: at 12:21

## 2023-01-01 RX ADMIN — PANTOPRAZOLE SODIUM 40 MILLIGRAM(S): 20 TABLET, DELAYED RELEASE ORAL at 17:23

## 2023-01-01 RX ADMIN — PANTOPRAZOLE SODIUM 40 MILLIGRAM(S): 20 TABLET, DELAYED RELEASE ORAL at 17:30

## 2023-01-01 RX ADMIN — Medication 3: at 01:44

## 2023-01-01 RX ADMIN — CHLORHEXIDINE GLUCONATE 15 MILLILITER(S): 213 SOLUTION TOPICAL at 05:48

## 2023-01-01 RX ADMIN — Medication 25 MILLIGRAM(S): at 23:58

## 2023-01-01 RX ADMIN — CHLORHEXIDINE GLUCONATE 1 APPLICATION(S): 213 SOLUTION TOPICAL at 05:24

## 2023-01-01 RX ADMIN — Medication 4: at 06:25

## 2023-01-01 RX ADMIN — Medication 4 DROP(S): at 17:45

## 2023-01-01 RX ADMIN — BUMETANIDE 2 MILLIGRAM(S): 0.25 INJECTION INTRAMUSCULAR; INTRAVENOUS at 06:58

## 2023-01-01 RX ADMIN — SODIUM CHLORIDE 3 MILLILITER(S): 9 INJECTION INTRAMUSCULAR; INTRAVENOUS; SUBCUTANEOUS at 12:05

## 2023-01-01 RX ADMIN — Medication 2: at 11:43

## 2023-01-01 RX ADMIN — Medication 1 DROP(S): at 15:23

## 2023-01-01 RX ADMIN — MUPIROCIN 1 APPLICATION(S): 20 OINTMENT TOPICAL at 17:18

## 2023-01-01 RX ADMIN — CHLORHEXIDINE GLUCONATE 1 APPLICATION(S): 213 SOLUTION TOPICAL at 06:18

## 2023-01-01 RX ADMIN — HUMAN INSULIN 6 UNIT(S): 100 INJECTION, SUSPENSION SUBCUTANEOUS at 05:04

## 2023-01-01 RX ADMIN — Medication 1 DROP(S): at 22:57

## 2023-01-01 RX ADMIN — Medication 100 MILLIGRAM(S): at 05:51

## 2023-01-01 RX ADMIN — Medication 2: at 06:13

## 2023-01-01 RX ADMIN — SODIUM CHLORIDE 2 GRAM(S): 9 INJECTION INTRAMUSCULAR; INTRAVENOUS; SUBCUTANEOUS at 18:00

## 2023-01-01 RX ADMIN — MIDODRINE HYDROCHLORIDE 40 MILLIGRAM(S): 2.5 TABLET ORAL at 05:19

## 2023-01-01 RX ADMIN — HEPARIN SODIUM 5000 UNIT(S): 5000 INJECTION INTRAVENOUS; SUBCUTANEOUS at 05:14

## 2023-01-01 RX ADMIN — CARVEDILOL PHOSPHATE 37.5 MILLIGRAM(S): 80 CAPSULE, EXTENDED RELEASE ORAL at 18:44

## 2023-01-01 RX ADMIN — HUMAN INSULIN 3 UNIT(S): 100 INJECTION, SUSPENSION SUBCUTANEOUS at 05:28

## 2023-01-01 RX ADMIN — Medication 2: at 00:29

## 2023-01-01 RX ADMIN — SODIUM CHLORIDE 2 GRAM(S): 9 INJECTION INTRAMUSCULAR; INTRAVENOUS; SUBCUTANEOUS at 05:24

## 2023-01-01 RX ADMIN — Medication 1 DROP(S): at 02:55

## 2023-01-01 RX ADMIN — SODIUM CHLORIDE 4 MILLILITER(S): 9 INJECTION INTRAMUSCULAR; INTRAVENOUS; SUBCUTANEOUS at 10:40

## 2023-01-01 RX ADMIN — MIDODRINE HYDROCHLORIDE 40 MILLIGRAM(S): 2.5 TABLET ORAL at 17:56

## 2023-01-01 RX ADMIN — CHLORHEXIDINE GLUCONATE 15 MILLILITER(S): 213 SOLUTION TOPICAL at 18:01

## 2023-01-01 RX ADMIN — CHLORHEXIDINE GLUCONATE 15 MILLILITER(S): 213 SOLUTION TOPICAL at 18:24

## 2023-01-01 RX ADMIN — Medication 25 MILLILITER(S): at 22:39

## 2023-01-01 RX ADMIN — SEVELAMER CARBONATE 1600 MILLIGRAM(S): 2400 POWDER, FOR SUSPENSION ORAL at 13:32

## 2023-01-01 RX ADMIN — HUMAN INSULIN 11 UNIT(S): 100 INJECTION, SUSPENSION SUBCUTANEOUS at 11:49

## 2023-01-01 RX ADMIN — Medication 3 MILLILITER(S): at 03:56

## 2023-01-01 RX ADMIN — DROXIDOPA 600 MILLIGRAM(S): 100 CAPSULE ORAL at 01:10

## 2023-01-01 RX ADMIN — Medication 300 MILLIGRAM(S): at 12:09

## 2023-01-01 RX ADMIN — HUMAN INSULIN 6 UNIT(S): 100 INJECTION, SUSPENSION SUBCUTANEOUS at 17:46

## 2023-01-01 RX ADMIN — Medication 8 MILLIGRAM(S): at 21:58

## 2023-01-01 RX ADMIN — HUMAN INSULIN 6 UNIT(S): 100 INJECTION, SUSPENSION SUBCUTANEOUS at 23:35

## 2023-01-01 RX ADMIN — Medication 50 MILLIGRAM(S): at 11:43

## 2023-01-01 RX ADMIN — Medication 4: at 11:59

## 2023-01-01 RX ADMIN — DROXIDOPA 600 MILLIGRAM(S): 100 CAPSULE ORAL at 10:23

## 2023-01-01 RX ADMIN — Medication 81 MILLIGRAM(S): at 12:30

## 2023-01-01 RX ADMIN — SODIUM CHLORIDE 4 MILLILITER(S): 9 INJECTION INTRAMUSCULAR; INTRAVENOUS; SUBCUTANEOUS at 15:37

## 2023-01-01 RX ADMIN — Medication 1 DROP(S): at 02:58

## 2023-01-01 RX ADMIN — HUMAN INSULIN 6 UNIT(S): 100 INJECTION, SUSPENSION SUBCUTANEOUS at 00:25

## 2023-01-01 RX ADMIN — Medication 1 DROP(S): at 14:38

## 2023-01-01 RX ADMIN — DROXIDOPA 600 MILLIGRAM(S): 100 CAPSULE ORAL at 11:45

## 2023-01-01 RX ADMIN — Medication 1 DROP(S): at 05:27

## 2023-01-01 RX ADMIN — MEROPENEM 100 MILLIGRAM(S): 1 INJECTION INTRAVENOUS at 11:16

## 2023-01-01 RX ADMIN — CHLORHEXIDINE GLUCONATE 1 APPLICATION(S): 213 SOLUTION TOPICAL at 06:04

## 2023-01-01 RX ADMIN — Medication 3 MILLILITER(S): at 12:06

## 2023-01-01 RX ADMIN — MIDODRINE HYDROCHLORIDE 40 MILLIGRAM(S): 2.5 TABLET ORAL at 06:06

## 2023-01-01 RX ADMIN — HUMAN INSULIN 6 UNIT(S): 100 INJECTION, SUSPENSION SUBCUTANEOUS at 05:14

## 2023-01-01 RX ADMIN — Medication 1 SPRAY(S): at 18:30

## 2023-01-01 RX ADMIN — MIDODRINE HYDROCHLORIDE 40 MILLIGRAM(S): 2.5 TABLET ORAL at 11:24

## 2023-01-01 RX ADMIN — PANTOPRAZOLE SODIUM 40 MILLIGRAM(S): 20 TABLET, DELAYED RELEASE ORAL at 17:11

## 2023-01-01 RX ADMIN — Medication 1 PACKET(S): at 12:07

## 2023-01-01 RX ADMIN — HUMAN INSULIN 3 UNIT(S): 100 INJECTION, SUSPENSION SUBCUTANEOUS at 23:29

## 2023-01-01 RX ADMIN — ALBUTEROL 2.5 MILLIGRAM(S): 90 AEROSOL, METERED ORAL at 16:10

## 2023-01-01 RX ADMIN — Medication 6.23 MICROGRAM(S)/KG/MIN: at 20:26

## 2023-01-01 RX ADMIN — Medication 1 DROP(S): at 10:57

## 2023-01-01 RX ADMIN — ALBUTEROL 2.5 MILLIGRAM(S): 90 AEROSOL, METERED ORAL at 22:23

## 2023-01-01 RX ADMIN — LEVETIRACETAM 250 MILLIGRAM(S): 250 TABLET, FILM COATED ORAL at 20:35

## 2023-01-01 RX ADMIN — Medication 25 MILLIGRAM(S): at 21:13

## 2023-01-01 RX ADMIN — CHLORHEXIDINE GLUCONATE 1 APPLICATION(S): 213 SOLUTION TOPICAL at 05:42

## 2023-01-01 RX ADMIN — Medication 3 MILLILITER(S): at 05:14

## 2023-01-01 RX ADMIN — Medication 1 APPLICATION(S): at 23:54

## 2023-01-01 RX ADMIN — MIDODRINE HYDROCHLORIDE 30 MILLIGRAM(S): 2.5 TABLET ORAL at 13:10

## 2023-01-01 RX ADMIN — BUMETANIDE 2 MILLIGRAM(S): 0.25 INJECTION INTRAMUSCULAR; INTRAVENOUS at 13:32

## 2023-01-01 RX ADMIN — Medication 1 DROP(S): at 01:30

## 2023-01-01 RX ADMIN — MIDODRINE HYDROCHLORIDE 40 MILLIGRAM(S): 2.5 TABLET ORAL at 12:42

## 2023-01-01 RX ADMIN — Medication 400 MILLIGRAM(S): at 04:53

## 2023-01-01 RX ADMIN — CHLORHEXIDINE GLUCONATE 15 MILLILITER(S): 213 SOLUTION TOPICAL at 06:55

## 2023-01-01 RX ADMIN — DONEPEZIL HYDROCHLORIDE 10 MILLIGRAM(S): 10 TABLET, FILM COATED ORAL at 21:59

## 2023-01-01 RX ADMIN — POLYMYXIN B SULFATE 250 UNIT(S): 500000 INJECTION, POWDER, LYOPHILIZED, FOR SOLUTION INTRAMUSCULAR; INTRATHECAL; INTRAVENOUS; OPHTHALMIC at 05:41

## 2023-01-01 RX ADMIN — Medication 6: at 00:53

## 2023-01-01 RX ADMIN — Medication 1 PACKET(S): at 11:32

## 2023-01-01 RX ADMIN — Medication 1 APPLICATION(S): at 00:50

## 2023-01-01 RX ADMIN — Medication 1 DROP(S): at 10:54

## 2023-01-01 RX ADMIN — HEPARIN SODIUM 5000 UNIT(S): 5000 INJECTION INTRAVENOUS; SUBCUTANEOUS at 13:37

## 2023-01-01 RX ADMIN — HEPARIN SODIUM 5000 UNIT(S): 5000 INJECTION INTRAVENOUS; SUBCUTANEOUS at 06:34

## 2023-01-01 RX ADMIN — CHLORHEXIDINE GLUCONATE 15 MILLILITER(S): 213 SOLUTION TOPICAL at 17:34

## 2023-01-01 RX ADMIN — PIPERACILLIN AND TAZOBACTAM 25 GRAM(S): 4; .5 INJECTION, POWDER, LYOPHILIZED, FOR SOLUTION INTRAVENOUS at 17:49

## 2023-01-01 RX ADMIN — DROXIDOPA 600 MILLIGRAM(S): 100 CAPSULE ORAL at 02:34

## 2023-01-01 RX ADMIN — HUMAN INSULIN 18 UNIT(S): 100 INJECTION, SUSPENSION SUBCUTANEOUS at 23:51

## 2023-01-01 RX ADMIN — APIXABAN 5 MILLIGRAM(S): 2.5 TABLET, FILM COATED ORAL at 17:53

## 2023-01-01 RX ADMIN — SODIUM CHLORIDE 1 GRAM(S): 9 INJECTION INTRAMUSCULAR; INTRAVENOUS; SUBCUTANEOUS at 06:27

## 2023-01-01 RX ADMIN — PANTOPRAZOLE SODIUM 40 MILLIGRAM(S): 20 TABLET, DELAYED RELEASE ORAL at 17:53

## 2023-01-01 RX ADMIN — Medication 1 DROP(S): at 17:08

## 2023-01-01 RX ADMIN — CHLORHEXIDINE GLUCONATE 1 APPLICATION(S): 213 SOLUTION TOPICAL at 05:23

## 2023-01-01 RX ADMIN — Medication 3 MILLILITER(S): at 15:32

## 2023-01-01 RX ADMIN — Medication 1 DROP(S): at 15:57

## 2023-01-01 RX ADMIN — MIDODRINE HYDROCHLORIDE 40 MILLIGRAM(S): 2.5 TABLET ORAL at 06:43

## 2023-01-01 RX ADMIN — CHLORHEXIDINE GLUCONATE 15 MILLILITER(S): 213 SOLUTION TOPICAL at 18:42

## 2023-01-01 RX ADMIN — HUMAN INSULIN 12 UNIT(S): 100 INJECTION, SUSPENSION SUBCUTANEOUS at 23:07

## 2023-01-01 RX ADMIN — MIDODRINE HYDROCHLORIDE 30 MILLIGRAM(S): 2.5 TABLET ORAL at 21:20

## 2023-01-01 RX ADMIN — CHLORHEXIDINE GLUCONATE 15 MILLILITER(S): 213 SOLUTION TOPICAL at 05:22

## 2023-01-01 RX ADMIN — SODIUM CHLORIDE 4 MILLILITER(S): 9 INJECTION INTRAMUSCULAR; INTRAVENOUS; SUBCUTANEOUS at 15:21

## 2023-01-01 RX ADMIN — DROXIDOPA 600 MILLIGRAM(S): 100 CAPSULE ORAL at 06:35

## 2023-01-01 RX ADMIN — Medication 1 DROP(S): at 10:22

## 2023-01-01 RX ADMIN — Medication 100 MILLIEQUIVALENT(S): at 03:34

## 2023-01-01 RX ADMIN — SODIUM CHLORIDE 4 MILLILITER(S): 9 INJECTION INTRAMUSCULAR; INTRAVENOUS; SUBCUTANEOUS at 16:42

## 2023-01-01 RX ADMIN — ATORVASTATIN CALCIUM 10 MILLIGRAM(S): 80 TABLET, FILM COATED ORAL at 22:21

## 2023-01-01 RX ADMIN — MIDODRINE HYDROCHLORIDE 30 MILLIGRAM(S): 2.5 TABLET ORAL at 21:42

## 2023-01-01 RX ADMIN — Medication 300 MILLIGRAM(S): at 12:57

## 2023-01-01 RX ADMIN — Medication 1 APPLICATION(S): at 18:48

## 2023-01-01 RX ADMIN — MUPIROCIN 1 APPLICATION(S): 20 OINTMENT TOPICAL at 18:01

## 2023-01-01 RX ADMIN — Medication 81 MILLIGRAM(S): at 11:59

## 2023-01-01 RX ADMIN — Medication 300 MILLIGRAM(S): at 11:43

## 2023-01-01 RX ADMIN — Medication 3 MILLILITER(S): at 15:42

## 2023-01-01 RX ADMIN — LEVETIRACETAM 250 MILLIGRAM(S): 250 TABLET, FILM COATED ORAL at 20:45

## 2023-01-01 RX ADMIN — HUMAN INSULIN 5 UNIT(S): 100 INJECTION, SUSPENSION SUBCUTANEOUS at 17:39

## 2023-01-01 RX ADMIN — APIXABAN 5 MILLIGRAM(S): 2.5 TABLET, FILM COATED ORAL at 05:33

## 2023-01-01 RX ADMIN — Medication 50 MILLILITER(S): at 03:18

## 2023-01-01 RX ADMIN — APIXABAN 5 MILLIGRAM(S): 2.5 TABLET, FILM COATED ORAL at 17:28

## 2023-01-01 RX ADMIN — DROXIDOPA 100 MILLIGRAM(S): 100 CAPSULE ORAL at 12:49

## 2023-01-01 RX ADMIN — MIDODRINE HYDROCHLORIDE 40 MILLIGRAM(S): 2.5 TABLET ORAL at 11:49

## 2023-01-01 RX ADMIN — ALBUTEROL 2.5 MILLIGRAM(S): 90 AEROSOL, METERED ORAL at 08:41

## 2023-01-01 RX ADMIN — Medication 2: at 01:12

## 2023-01-01 RX ADMIN — CISATRACURIUM BESYLATE 20 MILLIGRAM(S): 2 INJECTION INTRAVENOUS at 11:42

## 2023-01-01 RX ADMIN — DEXMEDETOMIDINE HYDROCHLORIDE IN 0.9% SODIUM CHLORIDE 4.99 MICROGRAM(S)/KG/HR: 4 INJECTION INTRAVENOUS at 20:07

## 2023-01-01 RX ADMIN — CHLORHEXIDINE GLUCONATE 15 MILLILITER(S): 213 SOLUTION TOPICAL at 18:37

## 2023-01-01 RX ADMIN — CHLORHEXIDINE GLUCONATE 15 MILLILITER(S): 213 SOLUTION TOPICAL at 18:38

## 2023-01-01 RX ADMIN — HEPARIN SODIUM 5000 UNIT(S): 5000 INJECTION INTRAVENOUS; SUBCUTANEOUS at 05:48

## 2023-01-01 RX ADMIN — SODIUM CHLORIDE 1 GRAM(S): 9 INJECTION INTRAMUSCULAR; INTRAVENOUS; SUBCUTANEOUS at 17:04

## 2023-01-01 RX ADMIN — CHLORHEXIDINE GLUCONATE 15 MILLILITER(S): 213 SOLUTION TOPICAL at 05:33

## 2023-01-01 RX ADMIN — Medication 3 MILLILITER(S): at 10:40

## 2023-01-01 RX ADMIN — Medication 40 MILLIGRAM(S): at 05:37

## 2023-01-01 RX ADMIN — Medication 1000 MILLIGRAM(S): at 02:08

## 2023-01-01 RX ADMIN — Medication 0.1 MILLIGRAM(S): at 05:44

## 2023-01-01 RX ADMIN — SODIUM CHLORIDE 4 MILLILITER(S): 9 INJECTION INTRAMUSCULAR; INTRAVENOUS; SUBCUTANEOUS at 05:17

## 2023-01-01 RX ADMIN — HUMAN INSULIN 6 UNIT(S): 100 INJECTION, SUSPENSION SUBCUTANEOUS at 06:09

## 2023-01-01 RX ADMIN — Medication 1 PACKET(S): at 12:01

## 2023-01-01 RX ADMIN — MEROPENEM 100 MILLIGRAM(S): 1 INJECTION INTRAVENOUS at 22:03

## 2023-01-01 RX ADMIN — Medication 1 APPLICATION(S): at 17:45

## 2023-01-01 RX ADMIN — HUMAN INSULIN 5 UNIT(S): 100 INJECTION, SUSPENSION SUBCUTANEOUS at 19:07

## 2023-01-01 RX ADMIN — Medication 3 MILLILITER(S): at 11:13

## 2023-01-01 RX ADMIN — Medication 3 MILLILITER(S): at 16:28

## 2023-01-01 RX ADMIN — FENTANYL CITRATE 50 MICROGRAM(S): 50 INJECTION INTRAVENOUS at 01:00

## 2023-01-01 RX ADMIN — NICARDIPINE HYDROCHLORIDE 25 MG/HR: 30 CAPSULE, EXTENDED RELEASE ORAL at 18:36

## 2023-01-01 RX ADMIN — SODIUM CHLORIDE 4 MILLILITER(S): 9 INJECTION INTRAMUSCULAR; INTRAVENOUS; SUBCUTANEOUS at 07:59

## 2023-01-01 RX ADMIN — HUMAN INSULIN 3 UNIT(S): 100 INJECTION, SUSPENSION SUBCUTANEOUS at 18:29

## 2023-01-01 RX ADMIN — POLYMYXIN B SULFATE 250 UNIT(S): 500000 INJECTION, POWDER, LYOPHILIZED, FOR SOLUTION INTRAMUSCULAR; INTRATHECAL; INTRAVENOUS; OPHTHALMIC at 17:58

## 2023-01-01 RX ADMIN — Medication 1 APPLICATION(S): at 23:04

## 2023-01-01 RX ADMIN — HUMAN INSULIN 12 UNIT(S): 100 INJECTION, SUSPENSION SUBCUTANEOUS at 18:11

## 2023-01-01 RX ADMIN — Medication 3 MILLILITER(S): at 04:30

## 2023-01-01 RX ADMIN — MUPIROCIN 1 APPLICATION(S): 20 OINTMENT TOPICAL at 05:30

## 2023-01-01 RX ADMIN — ATORVASTATIN CALCIUM 40 MILLIGRAM(S): 80 TABLET, FILM COATED ORAL at 21:58

## 2023-01-01 RX ADMIN — MIDODRINE HYDROCHLORIDE 40 MILLIGRAM(S): 2.5 TABLET ORAL at 17:28

## 2023-01-01 RX ADMIN — HEPARIN SODIUM 5000 UNIT(S): 5000 INJECTION INTRAVENOUS; SUBCUTANEOUS at 14:12

## 2023-01-01 RX ADMIN — SODIUM CHLORIDE 2 GRAM(S): 9 INJECTION INTRAMUSCULAR; INTRAVENOUS; SUBCUTANEOUS at 17:14

## 2023-01-01 RX ADMIN — HUMAN INSULIN 6 UNIT(S): 100 INJECTION, SUSPENSION SUBCUTANEOUS at 11:41

## 2023-01-01 RX ADMIN — Medication 25 MILLIGRAM(S): at 05:22

## 2023-01-01 RX ADMIN — DROXIDOPA 200 MILLIGRAM(S): 100 CAPSULE ORAL at 22:45

## 2023-01-01 RX ADMIN — POLYETHYLENE GLYCOL 3350 17 GRAM(S): 17 POWDER, FOR SOLUTION ORAL at 05:29

## 2023-01-01 RX ADMIN — Medication 1 APPLICATION(S): at 18:02

## 2023-01-01 RX ADMIN — Medication 3 MILLILITER(S): at 21:53

## 2023-01-01 RX ADMIN — Medication 2.49 MICROGRAM(S)/KG/MIN: at 08:13

## 2023-01-01 RX ADMIN — ALBUTEROL 2.5 MILLIGRAM(S): 90 AEROSOL, METERED ORAL at 10:08

## 2023-01-01 RX ADMIN — Medication 3 MILLILITER(S): at 22:58

## 2023-01-01 RX ADMIN — SODIUM CHLORIDE 4 MILLILITER(S): 9 INJECTION INTRAMUSCULAR; INTRAVENOUS; SUBCUTANEOUS at 15:22

## 2023-01-01 RX ADMIN — MIDODRINE HYDROCHLORIDE 40 MILLIGRAM(S): 2.5 TABLET ORAL at 12:12

## 2023-01-01 RX ADMIN — Medication 1 APPLICATION(S): at 18:34

## 2023-01-01 RX ADMIN — HUMAN INSULIN 12 UNIT(S): 100 INJECTION, SUSPENSION SUBCUTANEOUS at 11:40

## 2023-01-01 RX ADMIN — Medication 3 MILLILITER(S): at 10:11

## 2023-01-01 RX ADMIN — Medication 1 APPLICATION(S): at 10:40

## 2023-01-01 RX ADMIN — CHLORHEXIDINE GLUCONATE 1 APPLICATION(S): 213 SOLUTION TOPICAL at 05:17

## 2023-01-01 RX ADMIN — Medication 1 APPLICATION(S): at 11:23

## 2023-01-01 RX ADMIN — PANTOPRAZOLE SODIUM 40 MILLIGRAM(S): 20 TABLET, DELAYED RELEASE ORAL at 17:40

## 2023-01-01 RX ADMIN — CHLORHEXIDINE GLUCONATE 15 MILLILITER(S): 213 SOLUTION TOPICAL at 05:26

## 2023-01-01 RX ADMIN — PANTOPRAZOLE SODIUM 40 MILLIGRAM(S): 20 TABLET, DELAYED RELEASE ORAL at 17:37

## 2023-01-01 RX ADMIN — HUMAN INSULIN 6 UNIT(S): 100 INJECTION, SUSPENSION SUBCUTANEOUS at 23:36

## 2023-01-01 RX ADMIN — Medication 6.23 MICROGRAM(S)/KG/MIN: at 08:15

## 2023-01-01 RX ADMIN — Medication 1 DROP(S): at 09:32

## 2023-01-01 RX ADMIN — CHLORHEXIDINE GLUCONATE 15 MILLILITER(S): 213 SOLUTION TOPICAL at 05:29

## 2023-01-01 RX ADMIN — MUPIROCIN 1 APPLICATION(S): 20 OINTMENT TOPICAL at 18:52

## 2023-01-01 RX ADMIN — Medication 30 MILLIGRAM(S): at 05:17

## 2023-01-01 RX ADMIN — SODIUM CHLORIDE 4 MILLILITER(S): 9 INJECTION INTRAMUSCULAR; INTRAVENOUS; SUBCUTANEOUS at 21:12

## 2023-01-01 RX ADMIN — CHLORHEXIDINE GLUCONATE 1 APPLICATION(S): 213 SOLUTION TOPICAL at 06:10

## 2023-01-01 RX ADMIN — Medication 8 MILLIGRAM(S): at 22:54

## 2023-01-01 RX ADMIN — Medication 1 DROP(S): at 11:16

## 2023-01-01 RX ADMIN — PANTOPRAZOLE SODIUM 40 MILLIGRAM(S): 20 TABLET, DELAYED RELEASE ORAL at 18:25

## 2023-01-01 RX ADMIN — Medication 3 MILLILITER(S): at 21:18

## 2023-01-01 RX ADMIN — Medication 4: at 18:21

## 2023-01-01 RX ADMIN — Medication 4: at 23:48

## 2023-01-01 RX ADMIN — Medication 1 DROP(S): at 13:31

## 2023-01-01 RX ADMIN — Medication 4: at 06:09

## 2023-01-01 RX ADMIN — Medication 1 APPLICATION(S): at 11:11

## 2023-01-01 RX ADMIN — Medication 1 DROP(S): at 05:06

## 2023-01-01 RX ADMIN — Medication 6.23 MICROGRAM(S)/KG/MIN: at 09:32

## 2023-01-01 RX ADMIN — Medication 8: at 17:28

## 2023-01-01 RX ADMIN — Medication 1 APPLICATION(S): at 05:07

## 2023-01-01 RX ADMIN — SODIUM CHLORIDE 2 GRAM(S): 9 INJECTION INTRAMUSCULAR; INTRAVENOUS; SUBCUTANEOUS at 05:26

## 2023-01-01 RX ADMIN — CHLORHEXIDINE GLUCONATE 1 APPLICATION(S): 213 SOLUTION TOPICAL at 05:49

## 2023-01-01 RX ADMIN — Medication 1 APPLICATION(S): at 17:29

## 2023-01-01 RX ADMIN — HEPARIN SODIUM 5000 UNIT(S): 5000 INJECTION INTRAVENOUS; SUBCUTANEOUS at 05:16

## 2023-01-01 RX ADMIN — Medication 1 DROP(S): at 14:33

## 2023-01-01 RX ADMIN — POLYETHYLENE GLYCOL 3350 17 GRAM(S): 17 POWDER, FOR SOLUTION ORAL at 06:35

## 2023-01-01 RX ADMIN — Medication 1 DROP(S): at 22:04

## 2023-01-01 RX ADMIN — Medication 1 APPLICATION(S): at 00:27

## 2023-01-01 RX ADMIN — MIDODRINE HYDROCHLORIDE 40 MILLIGRAM(S): 2.5 TABLET ORAL at 12:39

## 2023-01-01 RX ADMIN — CHLORHEXIDINE GLUCONATE 1 APPLICATION(S): 213 SOLUTION TOPICAL at 06:53

## 2023-01-01 RX ADMIN — Medication 1 DROP(S): at 06:21

## 2023-01-01 RX ADMIN — HEPARIN SODIUM 9.5 UNIT(S)/HR: 5000 INJECTION INTRAVENOUS; SUBCUTANEOUS at 00:19

## 2023-01-01 RX ADMIN — Medication 300 MILLIGRAM(S): at 12:32

## 2023-01-01 RX ADMIN — MIDODRINE HYDROCHLORIDE 40 MILLIGRAM(S): 2.5 TABLET ORAL at 18:07

## 2023-01-01 RX ADMIN — MEROPENEM 100 MILLIGRAM(S): 1 INJECTION INTRAVENOUS at 19:26

## 2023-01-01 RX ADMIN — Medication 40 MILLIGRAM(S): at 17:27

## 2023-01-01 RX ADMIN — SODIUM CHLORIDE 4 MILLILITER(S): 9 INJECTION INTRAMUSCULAR; INTRAVENOUS; SUBCUTANEOUS at 23:03

## 2023-01-01 RX ADMIN — Medication 1 DROP(S): at 05:51

## 2023-01-01 RX ADMIN — HUMAN INSULIN 3 UNIT(S): 100 INJECTION, SUSPENSION SUBCUTANEOUS at 05:30

## 2023-01-01 RX ADMIN — Medication 1 DROP(S): at 09:40

## 2023-01-01 RX ADMIN — CHLORHEXIDINE GLUCONATE 1 APPLICATION(S): 213 SOLUTION TOPICAL at 11:16

## 2023-01-01 RX ADMIN — Medication 2: at 17:31

## 2023-01-01 RX ADMIN — Medication 81 MILLIGRAM(S): at 11:31

## 2023-01-01 RX ADMIN — Medication 4: at 08:25

## 2023-01-01 RX ADMIN — ATORVASTATIN CALCIUM 40 MILLIGRAM(S): 80 TABLET, FILM COATED ORAL at 21:28

## 2023-01-01 RX ADMIN — HEPARIN SODIUM 5000 UNIT(S): 5000 INJECTION INTRAVENOUS; SUBCUTANEOUS at 22:12

## 2023-01-01 RX ADMIN — SODIUM CHLORIDE 4 MILLILITER(S): 9 INJECTION INTRAMUSCULAR; INTRAVENOUS; SUBCUTANEOUS at 04:30

## 2023-01-01 RX ADMIN — HEPARIN SODIUM 11.5 UNIT(S)/HR: 5000 INJECTION INTRAVENOUS; SUBCUTANEOUS at 03:30

## 2023-01-01 RX ADMIN — Medication 4: at 12:11

## 2023-01-01 RX ADMIN — Medication 1 DROP(S): at 15:18

## 2023-01-01 RX ADMIN — SODIUM CHLORIDE 4 MILLILITER(S): 9 INJECTION INTRAMUSCULAR; INTRAVENOUS; SUBCUTANEOUS at 16:07

## 2023-01-01 RX ADMIN — MIDODRINE HYDROCHLORIDE 30 MILLIGRAM(S): 2.5 TABLET ORAL at 05:49

## 2023-01-01 RX ADMIN — VALACYCLOVIR 500 MILLIGRAM(S): 500 TABLET, FILM COATED ORAL at 13:17

## 2023-01-01 RX ADMIN — PANTOPRAZOLE SODIUM 40 MILLIGRAM(S): 20 TABLET, DELAYED RELEASE ORAL at 17:41

## 2023-01-01 RX ADMIN — Medication 1 DROP(S): at 10:26

## 2023-01-01 RX ADMIN — HEPARIN SODIUM 5000 UNIT(S): 5000 INJECTION INTRAVENOUS; SUBCUTANEOUS at 14:54

## 2023-01-01 RX ADMIN — Medication 1 DROP(S): at 09:24

## 2023-01-01 RX ADMIN — HUMAN INSULIN 6 UNIT(S): 100 INJECTION, SUSPENSION SUBCUTANEOUS at 23:55

## 2023-01-01 RX ADMIN — CALAMINE AND ZINC OXIDE AND PHENOL 1 APPLICATION(S): 160; 10 LOTION TOPICAL at 11:22

## 2023-01-01 RX ADMIN — HEPARIN SODIUM 10 UNIT(S)/HR: 5000 INJECTION INTRAVENOUS; SUBCUTANEOUS at 21:57

## 2023-01-01 RX ADMIN — CHLORHEXIDINE GLUCONATE 1 APPLICATION(S): 213 SOLUTION TOPICAL at 05:34

## 2023-01-01 RX ADMIN — CHLORHEXIDINE GLUCONATE 15 MILLILITER(S): 213 SOLUTION TOPICAL at 17:52

## 2023-01-01 RX ADMIN — Medication 2: at 00:36

## 2023-01-01 RX ADMIN — Medication 2 PACKET(S): at 11:20

## 2023-01-01 RX ADMIN — VALACYCLOVIR 500 MILLIGRAM(S): 500 TABLET, FILM COATED ORAL at 13:31

## 2023-01-01 RX ADMIN — SODIUM CHLORIDE 4 MILLILITER(S): 9 INJECTION INTRAMUSCULAR; INTRAVENOUS; SUBCUTANEOUS at 22:35

## 2023-01-01 RX ADMIN — MIDODRINE HYDROCHLORIDE 30 MILLIGRAM(S): 2.5 TABLET ORAL at 21:34

## 2023-01-01 RX ADMIN — Medication 2: at 00:45

## 2023-01-01 RX ADMIN — Medication 1 APPLICATION(S): at 18:07

## 2023-01-01 RX ADMIN — HUMAN INSULIN 6 UNIT(S): 100 INJECTION, SUSPENSION SUBCUTANEOUS at 06:52

## 2023-01-01 RX ADMIN — PANTOPRAZOLE SODIUM 40 MILLIGRAM(S): 20 TABLET, DELAYED RELEASE ORAL at 05:13

## 2023-01-01 RX ADMIN — SODIUM CHLORIDE 4 MILLILITER(S): 9 INJECTION INTRAMUSCULAR; INTRAVENOUS; SUBCUTANEOUS at 22:58

## 2023-01-01 RX ADMIN — MIDODRINE HYDROCHLORIDE 30 MILLIGRAM(S): 2.5 TABLET ORAL at 05:58

## 2023-01-01 RX ADMIN — MIDODRINE HYDROCHLORIDE 40 MILLIGRAM(S): 2.5 TABLET ORAL at 16:59

## 2023-01-01 RX ADMIN — MIDODRINE HYDROCHLORIDE 30 MILLIGRAM(S): 2.5 TABLET ORAL at 13:43

## 2023-01-01 RX ADMIN — MIDODRINE HYDROCHLORIDE 30 MILLIGRAM(S): 2.5 TABLET ORAL at 14:09

## 2023-01-01 RX ADMIN — HUMAN INSULIN 6 UNIT(S): 100 INJECTION, SUSPENSION SUBCUTANEOUS at 23:58

## 2023-01-01 RX ADMIN — Medication 3 MILLILITER(S): at 04:53

## 2023-01-01 RX ADMIN — Medication 1 APPLICATION(S): at 05:33

## 2023-01-01 RX ADMIN — Medication 1 DROP(S): at 17:13

## 2023-01-01 RX ADMIN — HEPARIN SODIUM 5000 UNIT(S): 5000 INJECTION INTRAVENOUS; SUBCUTANEOUS at 05:40

## 2023-01-01 RX ADMIN — PANTOPRAZOLE SODIUM 40 MILLIGRAM(S): 20 TABLET, DELAYED RELEASE ORAL at 17:33

## 2023-01-01 RX ADMIN — CHLORHEXIDINE GLUCONATE 1 APPLICATION(S): 213 SOLUTION TOPICAL at 06:22

## 2023-01-01 RX ADMIN — Medication 50 MILLIEQUIVALENT(S): at 19:04

## 2023-01-01 RX ADMIN — Medication 400 MILLIGRAM(S): at 23:37

## 2023-01-01 RX ADMIN — BUMETANIDE 10 MG/HR: 0.25 INJECTION INTRAMUSCULAR; INTRAVENOUS at 17:54

## 2023-01-01 RX ADMIN — Medication 3 MILLILITER(S): at 15:59

## 2023-01-01 RX ADMIN — Medication 650 MILLIGRAM(S): at 05:43

## 2023-01-01 RX ADMIN — MIDODRINE HYDROCHLORIDE 40 MILLIGRAM(S): 2.5 TABLET ORAL at 05:34

## 2023-01-01 RX ADMIN — Medication 6: at 17:50

## 2023-01-01 RX ADMIN — HUMAN INSULIN 6 UNIT(S): 100 INJECTION, SUSPENSION SUBCUTANEOUS at 17:12

## 2023-01-01 RX ADMIN — HUMAN INSULIN 6 UNIT(S): 100 INJECTION, SUSPENSION SUBCUTANEOUS at 23:53

## 2023-01-01 RX ADMIN — HUMAN INSULIN 6 UNIT(S): 100 INJECTION, SUSPENSION SUBCUTANEOUS at 17:44

## 2023-01-01 RX ADMIN — SODIUM CHLORIDE 4 MILLILITER(S): 9 INJECTION INTRAMUSCULAR; INTRAVENOUS; SUBCUTANEOUS at 15:24

## 2023-01-01 RX ADMIN — Medication 50 MILLIEQUIVALENT(S): at 21:56

## 2023-01-01 RX ADMIN — PANTOPRAZOLE SODIUM 40 MILLIGRAM(S): 20 TABLET, DELAYED RELEASE ORAL at 17:31

## 2023-01-01 RX ADMIN — Medication 300 MILLIGRAM(S): at 11:46

## 2023-01-01 RX ADMIN — CARVEDILOL PHOSPHATE 37.5 MILLIGRAM(S): 80 CAPSULE, EXTENDED RELEASE ORAL at 17:49

## 2023-01-01 RX ADMIN — PANTOPRAZOLE SODIUM 40 MILLIGRAM(S): 20 TABLET, DELAYED RELEASE ORAL at 05:41

## 2023-01-01 RX ADMIN — Medication 3: at 05:45

## 2023-01-01 RX ADMIN — LEVETIRACETAM 1000 MILLIGRAM(S): 250 TABLET, FILM COATED ORAL at 11:35

## 2023-01-01 RX ADMIN — SODIUM CHLORIDE 1 GRAM(S): 9 INJECTION INTRAMUSCULAR; INTRAVENOUS; SUBCUTANEOUS at 17:14

## 2023-01-01 RX ADMIN — CHLORHEXIDINE GLUCONATE 1 APPLICATION(S): 213 SOLUTION TOPICAL at 05:40

## 2023-01-01 RX ADMIN — SEVELAMER CARBONATE 1600 MILLIGRAM(S): 2400 POWDER, FOR SUSPENSION ORAL at 21:06

## 2023-01-01 RX ADMIN — ALBUTEROL 2.5 MILLIGRAM(S): 90 AEROSOL, METERED ORAL at 20:39

## 2023-01-01 RX ADMIN — Medication 1 APPLICATION(S): at 11:38

## 2023-01-01 RX ADMIN — LEVETIRACETAM 250 MILLIGRAM(S): 250 TABLET, FILM COATED ORAL at 20:34

## 2023-01-01 RX ADMIN — DROXIDOPA 600 MILLIGRAM(S): 100 CAPSULE ORAL at 09:51

## 2023-01-01 RX ADMIN — DROXIDOPA 200 MILLIGRAM(S): 100 CAPSULE ORAL at 05:11

## 2023-01-01 RX ADMIN — Medication 3 MILLILITER(S): at 03:59

## 2023-01-01 RX ADMIN — MIDODRINE HYDROCHLORIDE 40 MILLIGRAM(S): 2.5 TABLET ORAL at 17:47

## 2023-01-01 RX ADMIN — SODIUM CHLORIDE 4 MILLILITER(S): 9 INJECTION INTRAMUSCULAR; INTRAVENOUS; SUBCUTANEOUS at 22:06

## 2023-01-01 RX ADMIN — Medication 1 TABLET(S): at 12:01

## 2023-01-01 RX ADMIN — Medication 8: at 05:21

## 2023-01-01 RX ADMIN — CHLORHEXIDINE GLUCONATE 15 MILLILITER(S): 213 SOLUTION TOPICAL at 17:16

## 2023-01-01 RX ADMIN — CHLORHEXIDINE GLUCONATE 15 MILLILITER(S): 213 SOLUTION TOPICAL at 17:46

## 2023-01-01 RX ADMIN — APIXABAN 5 MILLIGRAM(S): 2.5 TABLET, FILM COATED ORAL at 05:17

## 2023-01-01 RX ADMIN — Medication 1 DROP(S): at 14:57

## 2023-01-01 RX ADMIN — Medication 81 MILLIGRAM(S): at 11:12

## 2023-01-01 RX ADMIN — POLYETHYLENE GLYCOL 3350 17 GRAM(S): 17 POWDER, FOR SOLUTION ORAL at 05:39

## 2023-01-01 RX ADMIN — CHLORHEXIDINE GLUCONATE 15 MILLILITER(S): 213 SOLUTION TOPICAL at 17:22

## 2023-01-01 RX ADMIN — Medication 300 MILLIGRAM(S): at 12:37

## 2023-01-01 RX ADMIN — Medication 1 APPLICATION(S): at 17:37

## 2023-01-01 RX ADMIN — Medication 300 MILLIGRAM(S): at 13:06

## 2023-01-01 RX ADMIN — PANTOPRAZOLE SODIUM 40 MILLIGRAM(S): 20 TABLET, DELAYED RELEASE ORAL at 05:23

## 2023-01-01 RX ADMIN — DROXIDOPA 600 MILLIGRAM(S): 100 CAPSULE ORAL at 10:17

## 2023-01-01 RX ADMIN — SODIUM CHLORIDE 1 GRAM(S): 9 INJECTION INTRAMUSCULAR; INTRAVENOUS; SUBCUTANEOUS at 17:28

## 2023-01-01 RX ADMIN — Medication 50 MILLIEQUIVALENT(S): at 19:59

## 2023-01-01 RX ADMIN — Medication 1 DROP(S): at 21:27

## 2023-01-01 RX ADMIN — PANTOPRAZOLE SODIUM 40 MILLIGRAM(S): 20 TABLET, DELAYED RELEASE ORAL at 21:07

## 2023-01-01 RX ADMIN — APIXABAN 5 MILLIGRAM(S): 2.5 TABLET, FILM COATED ORAL at 05:21

## 2023-01-01 RX ADMIN — Medication 1 APPLICATION(S): at 02:32

## 2023-01-01 RX ADMIN — Medication 4 DROP(S): at 18:16

## 2023-01-01 RX ADMIN — DROXIDOPA 200 MILLIGRAM(S): 100 CAPSULE ORAL at 09:28

## 2023-01-01 RX ADMIN — Medication 1 DROP(S): at 10:59

## 2023-01-01 RX ADMIN — Medication 6 MILLIGRAM(S): at 19:56

## 2023-01-01 RX ADMIN — Medication 3 MILLILITER(S): at 22:35

## 2023-01-01 RX ADMIN — SODIUM CHLORIDE 4 MILLILITER(S): 9 INJECTION INTRAMUSCULAR; INTRAVENOUS; SUBCUTANEOUS at 09:26

## 2023-01-01 RX ADMIN — MIDODRINE HYDROCHLORIDE 30 MILLIGRAM(S): 2.5 TABLET ORAL at 10:44

## 2023-01-01 RX ADMIN — PANTOPRAZOLE SODIUM 40 MILLIGRAM(S): 20 TABLET, DELAYED RELEASE ORAL at 05:26

## 2023-01-01 RX ADMIN — Medication 1 TABLET(S): at 10:33

## 2023-01-01 RX ADMIN — SODIUM CHLORIDE 4 MILLILITER(S): 9 INJECTION INTRAMUSCULAR; INTRAVENOUS; SUBCUTANEOUS at 21:42

## 2023-01-01 RX ADMIN — PANTOPRAZOLE SODIUM 40 MILLIGRAM(S): 20 TABLET, DELAYED RELEASE ORAL at 05:06

## 2023-01-01 RX ADMIN — SODIUM CHLORIDE 4 MILLILITER(S): 9 INJECTION INTRAMUSCULAR; INTRAVENOUS; SUBCUTANEOUS at 08:47

## 2023-01-01 RX ADMIN — ALBUTEROL 2.5 MILLIGRAM(S): 90 AEROSOL, METERED ORAL at 03:31

## 2023-01-01 RX ADMIN — HUMAN INSULIN 3 UNIT(S): 100 INJECTION, SUSPENSION SUBCUTANEOUS at 05:19

## 2023-01-01 RX ADMIN — Medication 100 MILLIGRAM(S): at 14:24

## 2023-01-01 RX ADMIN — HUMAN INSULIN 3 UNIT(S): 100 INJECTION, SUSPENSION SUBCUTANEOUS at 17:29

## 2023-01-01 RX ADMIN — Medication 650 MILLIGRAM(S): at 22:30

## 2023-01-01 RX ADMIN — DROXIDOPA 600 MILLIGRAM(S): 100 CAPSULE ORAL at 02:45

## 2023-01-01 RX ADMIN — DROXIDOPA 600 MILLIGRAM(S): 100 CAPSULE ORAL at 10:29

## 2023-01-01 RX ADMIN — BUMETANIDE 10 MG/HR: 0.25 INJECTION INTRAMUSCULAR; INTRAVENOUS at 21:46

## 2023-01-01 RX ADMIN — Medication 1 DROP(S): at 14:09

## 2023-01-01 RX ADMIN — Medication 4: at 06:36

## 2023-01-01 RX ADMIN — Medication 1 DROP(S): at 01:20

## 2023-01-01 RX ADMIN — Medication 1 DROP(S): at 02:01

## 2023-01-01 RX ADMIN — SODIUM CHLORIDE 4 MILLILITER(S): 9 INJECTION INTRAMUSCULAR; INTRAVENOUS; SUBCUTANEOUS at 16:04

## 2023-01-01 RX ADMIN — HEPARIN SODIUM 5000 UNIT(S): 5000 INJECTION INTRAVENOUS; SUBCUTANEOUS at 22:27

## 2023-01-01 RX ADMIN — Medication 4: at 06:02

## 2023-01-01 RX ADMIN — HEPARIN SODIUM 5000 UNIT(S): 5000 INJECTION INTRAVENOUS; SUBCUTANEOUS at 05:28

## 2023-01-01 RX ADMIN — CHLORHEXIDINE GLUCONATE 15 MILLILITER(S): 213 SOLUTION TOPICAL at 06:22

## 2023-01-01 RX ADMIN — Medication 3 MILLILITER(S): at 03:49

## 2023-01-01 RX ADMIN — CHLORHEXIDINE GLUCONATE 1 APPLICATION(S): 213 SOLUTION TOPICAL at 05:19

## 2023-01-01 RX ADMIN — Medication 1 DROP(S): at 18:04

## 2023-01-01 RX ADMIN — FENTANYL CITRATE 100 MICROGRAM(S): 50 INJECTION INTRAVENOUS at 16:47

## 2023-01-01 RX ADMIN — Medication 1 APPLICATION(S): at 17:22

## 2023-01-01 RX ADMIN — DEXMEDETOMIDINE HYDROCHLORIDE IN 0.9% SODIUM CHLORIDE 1.66 MICROGRAM(S)/KG/HR: 4 INJECTION INTRAVENOUS at 12:15

## 2023-01-01 RX ADMIN — Medication 2: at 11:54

## 2023-01-01 RX ADMIN — DONEPEZIL HYDROCHLORIDE 10 MILLIGRAM(S): 10 TABLET, FILM COATED ORAL at 21:37

## 2023-01-01 RX ADMIN — Medication 6: at 06:32

## 2023-01-01 RX ADMIN — CHLORHEXIDINE GLUCONATE 1 APPLICATION(S): 213 SOLUTION TOPICAL at 05:35

## 2023-01-01 RX ADMIN — SEVELAMER CARBONATE 800 MILLIGRAM(S): 2400 POWDER, FOR SUSPENSION ORAL at 22:14

## 2023-01-01 RX ADMIN — DROXIDOPA 600 MILLIGRAM(S): 100 CAPSULE ORAL at 18:54

## 2023-01-01 RX ADMIN — MIDODRINE HYDROCHLORIDE 40 MILLIGRAM(S): 2.5 TABLET ORAL at 11:35

## 2023-01-01 RX ADMIN — HUMAN INSULIN 8 UNIT(S): 100 INJECTION, SUSPENSION SUBCUTANEOUS at 05:26

## 2023-01-01 RX ADMIN — SEVELAMER CARBONATE 1600 MILLIGRAM(S): 2400 POWDER, FOR SUSPENSION ORAL at 22:56

## 2023-01-01 RX ADMIN — DROXIDOPA 100 MILLIGRAM(S): 100 CAPSULE ORAL at 17:45

## 2023-01-01 RX ADMIN — VALACYCLOVIR 500 MILLIGRAM(S): 500 TABLET, FILM COATED ORAL at 13:11

## 2023-01-01 RX ADMIN — Medication 3 MILLILITER(S): at 03:28

## 2023-01-01 RX ADMIN — Medication 300 MILLIGRAM(S): at 11:12

## 2023-01-01 RX ADMIN — Medication 30 MILLIGRAM(S): at 05:14

## 2023-01-01 RX ADMIN — Medication 3 MILLILITER(S): at 07:58

## 2023-01-01 RX ADMIN — CHLORHEXIDINE GLUCONATE 1 APPLICATION(S): 213 SOLUTION TOPICAL at 06:09

## 2023-01-01 RX ADMIN — DROXIDOPA 600 MILLIGRAM(S): 100 CAPSULE ORAL at 14:38

## 2023-01-01 RX ADMIN — MUPIROCIN 1 APPLICATION(S): 20 OINTMENT TOPICAL at 05:23

## 2023-01-01 RX ADMIN — MIDODRINE HYDROCHLORIDE 40 MILLIGRAM(S): 2.5 TABLET ORAL at 11:46

## 2023-01-01 RX ADMIN — HEPARIN SODIUM 5000 UNIT(S): 5000 INJECTION INTRAVENOUS; SUBCUTANEOUS at 16:24

## 2023-01-01 RX ADMIN — Medication 1 DROP(S): at 05:29

## 2023-01-01 RX ADMIN — Medication 600 MILLIGRAM(S): at 05:05

## 2023-01-01 RX ADMIN — Medication 2: at 00:53

## 2023-01-01 RX ADMIN — CHLORHEXIDINE GLUCONATE 15 MILLILITER(S): 213 SOLUTION TOPICAL at 05:53

## 2023-01-01 RX ADMIN — SODIUM CHLORIDE 50 MILLILITER(S): 9 INJECTION, SOLUTION INTRAVENOUS at 22:33

## 2023-01-01 RX ADMIN — Medication 1 DROP(S): at 02:17

## 2023-01-01 RX ADMIN — SODIUM CHLORIDE 4 MILLILITER(S): 9 INJECTION INTRAMUSCULAR; INTRAVENOUS; SUBCUTANEOUS at 10:22

## 2023-01-01 RX ADMIN — SODIUM CHLORIDE 2 GRAM(S): 9 INJECTION INTRAMUSCULAR; INTRAVENOUS; SUBCUTANEOUS at 05:37

## 2023-01-01 RX ADMIN — DROXIDOPA 600 MILLIGRAM(S): 100 CAPSULE ORAL at 02:57

## 2023-01-01 RX ADMIN — CHLORHEXIDINE GLUCONATE 15 MILLILITER(S): 213 SOLUTION TOPICAL at 06:19

## 2023-01-01 RX ADMIN — CHLORHEXIDINE GLUCONATE 15 MILLILITER(S): 213 SOLUTION TOPICAL at 18:34

## 2023-01-01 RX ADMIN — DROXIDOPA 600 MILLIGRAM(S): 100 CAPSULE ORAL at 01:16

## 2023-01-01 RX ADMIN — SODIUM CHLORIDE 1 GRAM(S): 9 INJECTION INTRAMUSCULAR; INTRAVENOUS; SUBCUTANEOUS at 17:18

## 2023-01-01 RX ADMIN — DROXIDOPA 600 MILLIGRAM(S): 100 CAPSULE ORAL at 02:08

## 2023-01-01 RX ADMIN — SEVELAMER CARBONATE 1600 MILLIGRAM(S): 2400 POWDER, FOR SUSPENSION ORAL at 21:39

## 2023-01-01 RX ADMIN — CHLORHEXIDINE GLUCONATE 1 APPLICATION(S): 213 SOLUTION TOPICAL at 06:19

## 2023-01-01 RX ADMIN — Medication 2: at 00:34

## 2023-01-01 RX ADMIN — MEROPENEM 100 MILLIGRAM(S): 1 INJECTION INTRAVENOUS at 21:02

## 2023-01-01 RX ADMIN — PANTOPRAZOLE SODIUM 40 MILLIGRAM(S): 20 TABLET, DELAYED RELEASE ORAL at 05:02

## 2023-01-01 RX ADMIN — Medication 1 APPLICATION(S): at 17:40

## 2023-01-01 RX ADMIN — HUMAN INSULIN 6 UNIT(S): 100 INJECTION, SUSPENSION SUBCUTANEOUS at 05:35

## 2023-01-01 RX ADMIN — HUMAN INSULIN 3 UNIT(S): 100 INJECTION, SUSPENSION SUBCUTANEOUS at 23:44

## 2023-01-01 RX ADMIN — HUMAN INSULIN 11 UNIT(S): 100 INJECTION, SUSPENSION SUBCUTANEOUS at 11:25

## 2023-01-01 RX ADMIN — ATORVASTATIN CALCIUM 40 MILLIGRAM(S): 80 TABLET, FILM COATED ORAL at 21:08

## 2023-01-01 RX ADMIN — Medication 2: at 00:06

## 2023-01-01 RX ADMIN — PANTOPRAZOLE SODIUM 40 MILLIGRAM(S): 20 TABLET, DELAYED RELEASE ORAL at 17:15

## 2023-01-01 RX ADMIN — Medication 3 MILLILITER(S): at 15:30

## 2023-01-01 RX ADMIN — PANTOPRAZOLE SODIUM 40 MILLIGRAM(S): 20 TABLET, DELAYED RELEASE ORAL at 05:11

## 2023-01-01 RX ADMIN — Medication 3 MILLILITER(S): at 09:11

## 2023-01-01 RX ADMIN — Medication 300 MILLIGRAM(S): at 11:53

## 2023-01-01 RX ADMIN — Medication 10 UNIT(S): at 16:51

## 2023-01-01 RX ADMIN — SODIUM CHLORIDE 1 GRAM(S): 9 INJECTION INTRAMUSCULAR; INTRAVENOUS; SUBCUTANEOUS at 17:10

## 2023-01-01 RX ADMIN — Medication 10 MILLIGRAM(S): at 05:03

## 2023-01-01 RX ADMIN — HYDROMORPHONE HYDROCHLORIDE 0.5 MILLIGRAM(S): 2 INJECTION INTRAMUSCULAR; INTRAVENOUS; SUBCUTANEOUS at 02:02

## 2023-01-01 RX ADMIN — CHLORHEXIDINE GLUCONATE 1 APPLICATION(S): 213 SOLUTION TOPICAL at 05:57

## 2023-01-01 RX ADMIN — Medication 6.23 MICROGRAM(S)/KG/MIN: at 07:20

## 2023-01-01 RX ADMIN — HUMAN INSULIN 6 UNIT(S): 100 INJECTION, SUSPENSION SUBCUTANEOUS at 23:52

## 2023-01-01 RX ADMIN — Medication 6 MILLIGRAM(S): at 09:46

## 2023-01-01 RX ADMIN — APIXABAN 5 MILLIGRAM(S): 2.5 TABLET, FILM COATED ORAL at 18:12

## 2023-01-01 RX ADMIN — Medication 6: at 23:08

## 2023-01-01 RX ADMIN — Medication 3 MILLILITER(S): at 07:17

## 2023-01-01 RX ADMIN — HUMAN INSULIN 12 UNIT(S): 100 INJECTION, SUSPENSION SUBCUTANEOUS at 11:18

## 2023-01-01 RX ADMIN — DIPHENHYDRAMINE HYDROCHLORIDE AND LIDOCAINE HYDROCHLORIDE AND ALUMINUM HYDROXIDE AND MAGNESIUM HYDRO 10 MILLILITER(S): KIT at 23:36

## 2023-01-01 RX ADMIN — Medication 3 MILLILITER(S): at 10:21

## 2023-01-01 RX ADMIN — Medication 3: at 05:15

## 2023-01-01 RX ADMIN — CHLORHEXIDINE GLUCONATE 1 APPLICATION(S): 213 SOLUTION TOPICAL at 05:20

## 2023-01-01 RX ADMIN — DROXIDOPA 600 MILLIGRAM(S): 100 CAPSULE ORAL at 09:02

## 2023-01-01 RX ADMIN — HEPARIN SODIUM 5000 UNIT(S): 5000 INJECTION INTRAVENOUS; SUBCUTANEOUS at 21:07

## 2023-01-01 RX ADMIN — Medication 2: at 05:32

## 2023-01-01 RX ADMIN — HUMAN INSULIN 8 UNIT(S): 100 INJECTION, SUSPENSION SUBCUTANEOUS at 17:48

## 2023-01-01 RX ADMIN — HUMAN INSULIN 6 UNIT(S): 100 INJECTION, SUSPENSION SUBCUTANEOUS at 23:03

## 2023-01-01 RX ADMIN — Medication 3 MILLILITER(S): at 05:17

## 2023-01-01 RX ADMIN — SODIUM CHLORIDE 2 GRAM(S): 9 INJECTION INTRAMUSCULAR; INTRAVENOUS; SUBCUTANEOUS at 17:02

## 2023-01-01 RX ADMIN — MUPIROCIN 1 APPLICATION(S): 20 OINTMENT TOPICAL at 06:14

## 2023-01-01 RX ADMIN — POLYMYXIN B SULFATE 250 UNIT(S): 500000 INJECTION, POWDER, LYOPHILIZED, FOR SOLUTION INTRAMUSCULAR; INTRATHECAL; INTRAVENOUS; OPHTHALMIC at 17:19

## 2023-01-01 RX ADMIN — PROPOFOL 2 MICROGRAM(S)/KG/MIN: 10 INJECTION, EMULSION INTRAVENOUS at 00:29

## 2023-01-01 RX ADMIN — HUMAN INSULIN 6 UNIT(S): 100 INJECTION, SUSPENSION SUBCUTANEOUS at 00:01

## 2023-01-01 RX ADMIN — Medication 1 DROP(S): at 01:03

## 2023-01-01 RX ADMIN — HUMAN INSULIN 3 UNIT(S): 100 INJECTION, SUSPENSION SUBCUTANEOUS at 23:52

## 2023-01-01 RX ADMIN — PANTOPRAZOLE SODIUM 40 MILLIGRAM(S): 20 TABLET, DELAYED RELEASE ORAL at 17:56

## 2023-01-01 RX ADMIN — SODIUM CHLORIDE 4 MILLILITER(S): 9 INJECTION INTRAMUSCULAR; INTRAVENOUS; SUBCUTANEOUS at 15:42

## 2023-01-01 RX ADMIN — Medication 1 DROP(S): at 02:09

## 2023-01-01 RX ADMIN — Medication 1 APPLICATION(S): at 18:10

## 2023-01-01 RX ADMIN — PANTOPRAZOLE SODIUM 40 MILLIGRAM(S): 20 TABLET, DELAYED RELEASE ORAL at 17:38

## 2023-01-01 RX ADMIN — HUMAN INSULIN 11 UNIT(S): 100 INJECTION, SUSPENSION SUBCUTANEOUS at 12:03

## 2023-01-01 RX ADMIN — CHLORHEXIDINE GLUCONATE 1 APPLICATION(S): 213 SOLUTION TOPICAL at 07:02

## 2023-01-01 RX ADMIN — DROXIDOPA 600 MILLIGRAM(S): 100 CAPSULE ORAL at 10:13

## 2023-01-01 RX ADMIN — CHLORHEXIDINE GLUCONATE 15 MILLILITER(S): 213 SOLUTION TOPICAL at 05:27

## 2023-01-01 RX ADMIN — Medication 6.23 MICROGRAM(S)/KG/MIN: at 12:46

## 2023-01-01 RX ADMIN — APIXABAN 10 MILLIGRAM(S): 2.5 TABLET, FILM COATED ORAL at 18:17

## 2023-01-01 RX ADMIN — SODIUM CHLORIDE 1 GRAM(S): 9 INJECTION INTRAMUSCULAR; INTRAVENOUS; SUBCUTANEOUS at 18:08

## 2023-01-01 RX ADMIN — Medication 1 APPLICATION(S): at 04:13

## 2023-01-01 RX ADMIN — CHLORHEXIDINE GLUCONATE 15 MILLILITER(S): 213 SOLUTION TOPICAL at 17:02

## 2023-01-01 RX ADMIN — CHLORHEXIDINE GLUCONATE 15 MILLILITER(S): 213 SOLUTION TOPICAL at 05:58

## 2023-01-01 RX ADMIN — CALAMINE AND ZINC OXIDE AND PHENOL 1 APPLICATION(S): 160; 10 LOTION TOPICAL at 14:56

## 2023-01-01 RX ADMIN — DIPHENHYDRAMINE HYDROCHLORIDE AND LIDOCAINE HYDROCHLORIDE AND ALUMINUM HYDROXIDE AND MAGNESIUM HYDRO 10 MILLILITER(S): KIT at 17:18

## 2023-01-01 RX ADMIN — HEPARIN SODIUM 5000 UNIT(S): 5000 INJECTION INTRAVENOUS; SUBCUTANEOUS at 06:31

## 2023-01-01 RX ADMIN — Medication 1 APPLICATION(S): at 17:06

## 2023-01-01 RX ADMIN — Medication 81 MILLIGRAM(S): at 12:59

## 2023-01-01 RX ADMIN — Medication 8 MILLIGRAM(S): at 21:43

## 2023-01-01 RX ADMIN — DROXIDOPA 600 MILLIGRAM(S): 100 CAPSULE ORAL at 02:36

## 2023-01-01 RX ADMIN — Medication 81 MILLIGRAM(S): at 13:07

## 2023-01-01 RX ADMIN — Medication 2: at 23:58

## 2023-01-01 RX ADMIN — DROXIDOPA 600 MILLIGRAM(S): 100 CAPSULE ORAL at 09:13

## 2023-01-01 RX ADMIN — Medication 0.1 MILLIGRAM(S): at 01:57

## 2023-01-01 RX ADMIN — Medication 10: at 11:49

## 2023-01-01 RX ADMIN — Medication 1 APPLICATION(S): at 11:48

## 2023-01-01 RX ADMIN — CHLORHEXIDINE GLUCONATE 15 MILLILITER(S): 213 SOLUTION TOPICAL at 17:24

## 2023-01-01 RX ADMIN — Medication 50 MILLIGRAM(S): at 15:19

## 2023-01-01 RX ADMIN — Medication 2: at 00:03

## 2023-01-01 RX ADMIN — CHLORHEXIDINE GLUCONATE 1 APPLICATION(S): 213 SOLUTION TOPICAL at 05:36

## 2023-01-01 RX ADMIN — SODIUM CHLORIDE 1 GRAM(S): 9 INJECTION INTRAMUSCULAR; INTRAVENOUS; SUBCUTANEOUS at 18:36

## 2023-01-01 RX ADMIN — APIXABAN 5 MILLIGRAM(S): 2.5 TABLET, FILM COATED ORAL at 19:06

## 2023-01-01 RX ADMIN — Medication 3 MILLILITER(S): at 22:05

## 2023-01-01 RX ADMIN — Medication 1 APPLICATION(S): at 05:19

## 2023-01-01 RX ADMIN — Medication 650 MILLIGRAM(S): at 22:50

## 2023-01-01 RX ADMIN — Medication 300 MILLIGRAM(S): at 12:16

## 2023-01-01 RX ADMIN — LEVETIRACETAM 1000 MILLIGRAM(S): 250 TABLET, FILM COATED ORAL at 11:20

## 2023-01-01 RX ADMIN — MIDODRINE HYDROCHLORIDE 30 MILLIGRAM(S): 2.5 TABLET ORAL at 05:20

## 2023-01-01 RX ADMIN — MEROPENEM 100 MILLIGRAM(S): 1 INJECTION INTRAVENOUS at 21:40

## 2023-01-01 RX ADMIN — Medication 3 MILLILITER(S): at 04:11

## 2023-01-01 RX ADMIN — Medication 1 DROP(S): at 04:53

## 2023-01-01 RX ADMIN — CHLORHEXIDINE GLUCONATE 1 APPLICATION(S): 213 SOLUTION TOPICAL at 05:47

## 2023-01-01 RX ADMIN — DROXIDOPA 600 MILLIGRAM(S): 100 CAPSULE ORAL at 09:06

## 2023-01-01 RX ADMIN — NICARDIPINE HYDROCHLORIDE 12.5 MG/HR: 30 CAPSULE, EXTENDED RELEASE ORAL at 22:06

## 2023-01-01 RX ADMIN — SODIUM CHLORIDE 4 MILLILITER(S): 9 INJECTION INTRAMUSCULAR; INTRAVENOUS; SUBCUTANEOUS at 04:18

## 2023-01-01 RX ADMIN — Medication 400 MILLIGRAM(S): at 21:22

## 2023-01-01 RX ADMIN — SODIUM CHLORIDE 1 GRAM(S): 9 INJECTION INTRAMUSCULAR; INTRAVENOUS; SUBCUTANEOUS at 05:48

## 2023-01-01 RX ADMIN — Medication 1 DROP(S): at 02:08

## 2023-01-01 RX ADMIN — Medication 81 MILLIGRAM(S): at 11:47

## 2023-01-01 RX ADMIN — DEXMEDETOMIDINE HYDROCHLORIDE IN 0.9% SODIUM CHLORIDE 1.66 MICROGRAM(S)/KG/HR: 4 INJECTION INTRAVENOUS at 23:51

## 2023-01-01 RX ADMIN — LETROZOLE 2.5 MILLIGRAM(S): 2.5 TABLET, FILM COATED ORAL at 11:04

## 2023-01-01 RX ADMIN — Medication 4: at 11:25

## 2023-01-01 RX ADMIN — Medication 1 DROP(S): at 23:52

## 2023-01-01 RX ADMIN — HUMAN INSULIN 6 UNIT(S): 100 INJECTION, SUSPENSION SUBCUTANEOUS at 12:05

## 2023-01-01 RX ADMIN — ALBUTEROL 2.5 MILLIGRAM(S): 90 AEROSOL, METERED ORAL at 15:41

## 2023-01-01 RX ADMIN — MIDODRINE HYDROCHLORIDE 40 MILLIGRAM(S): 2.5 TABLET ORAL at 05:47

## 2023-01-01 RX ADMIN — Medication 3 MILLILITER(S): at 16:45

## 2023-01-01 RX ADMIN — HEPARIN SODIUM 5000 UNIT(S): 5000 INJECTION INTRAVENOUS; SUBCUTANEOUS at 13:05

## 2023-01-01 RX ADMIN — MIDODRINE HYDROCHLORIDE 30 MILLIGRAM(S): 2.5 TABLET ORAL at 21:13

## 2023-01-01 RX ADMIN — MEROPENEM 100 MILLIGRAM(S): 1 INJECTION INTRAVENOUS at 21:22

## 2023-01-01 RX ADMIN — Medication 2: at 11:22

## 2023-01-01 RX ADMIN — Medication 3 MILLILITER(S): at 21:44

## 2023-01-01 RX ADMIN — HUMAN INSULIN 3 UNIT(S): 100 INJECTION, SUSPENSION SUBCUTANEOUS at 05:34

## 2023-01-01 RX ADMIN — SODIUM CHLORIDE 1 GRAM(S): 9 INJECTION INTRAMUSCULAR; INTRAVENOUS; SUBCUTANEOUS at 17:24

## 2023-01-01 RX ADMIN — Medication 1 DROP(S): at 01:01

## 2023-01-01 RX ADMIN — DROXIDOPA 600 MILLIGRAM(S): 100 CAPSULE ORAL at 15:13

## 2023-01-01 RX ADMIN — Medication 3: at 11:58

## 2023-01-01 RX ADMIN — Medication 1 DROP(S): at 02:12

## 2023-01-01 RX ADMIN — CHLORHEXIDINE GLUCONATE 15 MILLILITER(S): 213 SOLUTION TOPICAL at 05:15

## 2023-01-01 RX ADMIN — Medication 1 APPLICATION(S): at 12:39

## 2023-01-01 RX ADMIN — SODIUM CHLORIDE 4 MILLILITER(S): 9 INJECTION INTRAMUSCULAR; INTRAVENOUS; SUBCUTANEOUS at 09:22

## 2023-01-01 RX ADMIN — Medication 1 DROP(S): at 11:05

## 2023-01-01 RX ADMIN — CHLORHEXIDINE GLUCONATE 15 MILLILITER(S): 213 SOLUTION TOPICAL at 05:11

## 2023-01-01 RX ADMIN — Medication 3 MILLILITER(S): at 09:46

## 2023-01-01 RX ADMIN — SODIUM CHLORIDE 4 MILLILITER(S): 9 INJECTION INTRAMUSCULAR; INTRAVENOUS; SUBCUTANEOUS at 20:30

## 2023-01-01 RX ADMIN — Medication 1 DROP(S): at 08:27

## 2023-01-01 RX ADMIN — Medication 3 MILLILITER(S): at 17:49

## 2023-01-01 RX ADMIN — SODIUM CHLORIDE 2 GRAM(S): 9 INJECTION INTRAMUSCULAR; INTRAVENOUS; SUBCUTANEOUS at 17:11

## 2023-01-01 RX ADMIN — Medication 7 UNIT(S): at 12:53

## 2023-01-01 RX ADMIN — HEPARIN SODIUM 5000 UNIT(S): 5000 INJECTION INTRAVENOUS; SUBCUTANEOUS at 14:49

## 2023-01-01 RX ADMIN — Medication 1 APPLICATION(S): at 05:23

## 2023-01-01 RX ADMIN — MEROPENEM 100 MILLIGRAM(S): 1 INJECTION INTRAVENOUS at 09:23

## 2023-01-01 RX ADMIN — MIDODRINE HYDROCHLORIDE 40 MILLIGRAM(S): 2.5 TABLET ORAL at 17:34

## 2023-01-01 RX ADMIN — Medication 6: at 17:59

## 2023-01-01 RX ADMIN — Medication 4 DROP(S): at 05:04

## 2023-01-01 RX ADMIN — ALBUTEROL 2.5 MILLIGRAM(S): 90 AEROSOL, METERED ORAL at 10:41

## 2023-01-01 RX ADMIN — Medication 3 MILLILITER(S): at 12:01

## 2023-01-01 RX ADMIN — Medication 4: at 23:51

## 2023-01-01 RX ADMIN — Medication 1 APPLICATION(S): at 00:13

## 2023-01-01 RX ADMIN — Medication 0.5 MILLIGRAM(S): at 05:37

## 2023-01-01 RX ADMIN — CHLORHEXIDINE GLUCONATE 15 MILLILITER(S): 213 SOLUTION TOPICAL at 05:25

## 2023-01-01 RX ADMIN — Medication 100 MILLIEQUIVALENT(S): at 03:17

## 2023-01-01 RX ADMIN — SODIUM CHLORIDE 4 MILLILITER(S): 9 INJECTION INTRAMUSCULAR; INTRAVENOUS; SUBCUTANEOUS at 10:21

## 2023-01-01 RX ADMIN — Medication 50 MILLIGRAM(S): at 17:50

## 2023-01-01 RX ADMIN — HUMAN INSULIN 6 UNIT(S): 100 INJECTION, SUSPENSION SUBCUTANEOUS at 05:26

## 2023-01-01 RX ADMIN — MIDODRINE HYDROCHLORIDE 40 MILLIGRAM(S): 2.5 TABLET ORAL at 11:14

## 2023-01-01 RX ADMIN — Medication 20 MILLIGRAM(S): at 21:10

## 2023-01-01 RX ADMIN — FENTANYL CITRATE 100 MICROGRAM(S): 50 INJECTION INTRAVENOUS at 19:30

## 2023-01-01 RX ADMIN — PANTOPRAZOLE SODIUM 40 MILLIGRAM(S): 20 TABLET, DELAYED RELEASE ORAL at 17:06

## 2023-01-01 RX ADMIN — Medication 6 MILLIGRAM(S): at 19:41

## 2023-01-01 RX ADMIN — APIXABAN 5 MILLIGRAM(S): 2.5 TABLET, FILM COATED ORAL at 17:18

## 2023-01-01 RX ADMIN — Medication 3 MILLILITER(S): at 21:15

## 2023-01-01 RX ADMIN — Medication 3 MILLILITER(S): at 16:25

## 2023-01-01 RX ADMIN — MIDODRINE HYDROCHLORIDE 30 MILLIGRAM(S): 2.5 TABLET ORAL at 05:17

## 2023-01-01 RX ADMIN — Medication 0.3 MILLIGRAM(S): at 14:46

## 2023-01-01 RX ADMIN — Medication 3 MILLILITER(S): at 04:02

## 2023-01-01 RX ADMIN — Medication 1 DROP(S): at 03:39

## 2023-01-01 RX ADMIN — HUMAN INSULIN 6 UNIT(S): 100 INJECTION, SUSPENSION SUBCUTANEOUS at 18:13

## 2023-01-01 RX ADMIN — SODIUM CHLORIDE 1 GRAM(S): 9 INJECTION INTRAMUSCULAR; INTRAVENOUS; SUBCUTANEOUS at 05:26

## 2023-01-01 RX ADMIN — HUMAN INSULIN 3 UNIT(S): 100 INJECTION, SUSPENSION SUBCUTANEOUS at 18:21

## 2023-01-01 RX ADMIN — Medication 4: at 17:30

## 2023-01-01 RX ADMIN — Medication 2: at 09:07

## 2023-01-01 RX ADMIN — Medication 1 DROP(S): at 01:35

## 2023-01-01 RX ADMIN — HUMAN INSULIN 5 UNIT(S): 100 INJECTION, SUSPENSION SUBCUTANEOUS at 17:44

## 2023-01-01 RX ADMIN — ATORVASTATIN CALCIUM 10 MILLIGRAM(S): 80 TABLET, FILM COATED ORAL at 21:20

## 2023-01-01 RX ADMIN — SODIUM CHLORIDE 4 MILLILITER(S): 9 INJECTION INTRAMUSCULAR; INTRAVENOUS; SUBCUTANEOUS at 03:55

## 2023-01-01 RX ADMIN — Medication 3 MILLILITER(S): at 02:52

## 2023-01-01 RX ADMIN — Medication 1 DROP(S): at 06:04

## 2023-01-01 RX ADMIN — ALBUTEROL 2.5 MILLIGRAM(S): 90 AEROSOL, METERED ORAL at 03:30

## 2023-01-01 RX ADMIN — ATORVASTATIN CALCIUM 40 MILLIGRAM(S): 80 TABLET, FILM COATED ORAL at 23:34

## 2023-01-01 RX ADMIN — SODIUM CHLORIDE 1 GRAM(S): 9 INJECTION INTRAMUSCULAR; INTRAVENOUS; SUBCUTANEOUS at 05:31

## 2023-01-01 RX ADMIN — Medication 81 MILLIGRAM(S): at 13:18

## 2023-01-01 RX ADMIN — Medication 650 MILLIGRAM(S): at 01:41

## 2023-01-01 RX ADMIN — Medication 1 DROP(S): at 08:13

## 2023-01-01 RX ADMIN — DROXIDOPA 600 MILLIGRAM(S): 100 CAPSULE ORAL at 17:44

## 2023-01-01 RX ADMIN — MEROPENEM 100 MILLIGRAM(S): 1 INJECTION INTRAVENOUS at 17:14

## 2023-01-01 RX ADMIN — CHLORHEXIDINE GLUCONATE 15 MILLILITER(S): 213 SOLUTION TOPICAL at 05:49

## 2023-01-01 RX ADMIN — PANTOPRAZOLE SODIUM 40 MILLIGRAM(S): 20 TABLET, DELAYED RELEASE ORAL at 05:50

## 2023-01-01 RX ADMIN — MIDODRINE HYDROCHLORIDE 40 MILLIGRAM(S): 2.5 TABLET ORAL at 05:40

## 2023-01-01 RX ADMIN — APIXABAN 5 MILLIGRAM(S): 2.5 TABLET, FILM COATED ORAL at 05:14

## 2023-01-01 RX ADMIN — HEPARIN SODIUM 9.5 UNIT(S)/HR: 5000 INJECTION INTRAVENOUS; SUBCUTANEOUS at 20:08

## 2023-01-01 RX ADMIN — SODIUM CHLORIDE 4 MILLILITER(S): 9 INJECTION INTRAMUSCULAR; INTRAVENOUS; SUBCUTANEOUS at 21:18

## 2023-01-01 RX ADMIN — CHLORHEXIDINE GLUCONATE 1 APPLICATION(S): 213 SOLUTION TOPICAL at 05:01

## 2023-01-01 RX ADMIN — MIDODRINE HYDROCHLORIDE 30 MILLIGRAM(S): 2.5 TABLET ORAL at 05:38

## 2023-01-01 RX ADMIN — SODIUM CHLORIDE 1 GRAM(S): 9 INJECTION INTRAMUSCULAR; INTRAVENOUS; SUBCUTANEOUS at 17:46

## 2023-01-01 RX ADMIN — HUMAN INSULIN 5 UNIT(S): 100 INJECTION, SUSPENSION SUBCUTANEOUS at 07:05

## 2023-01-01 RX ADMIN — Medication 4: at 11:51

## 2023-01-01 RX ADMIN — SEVELAMER CARBONATE 1600 MILLIGRAM(S): 2400 POWDER, FOR SUSPENSION ORAL at 14:04

## 2023-01-01 RX ADMIN — HUMAN INSULIN 6 UNIT(S): 100 INJECTION, SUSPENSION SUBCUTANEOUS at 18:22

## 2023-01-01 RX ADMIN — Medication 100 MILLIGRAM(S): at 17:56

## 2023-01-01 RX ADMIN — CHLORHEXIDINE GLUCONATE 15 MILLILITER(S): 213 SOLUTION TOPICAL at 06:53

## 2023-01-01 RX ADMIN — HUMAN INSULIN 6 UNIT(S): 100 INJECTION, SUSPENSION SUBCUTANEOUS at 17:34

## 2023-01-01 RX ADMIN — SODIUM CHLORIDE 50 MILLILITER(S): 5 INJECTION, SOLUTION INTRAVENOUS at 13:25

## 2023-01-01 RX ADMIN — Medication 2: at 13:13

## 2023-01-01 RX ADMIN — CHLORHEXIDINE GLUCONATE 15 MILLILITER(S): 213 SOLUTION TOPICAL at 06:34

## 2023-01-01 RX ADMIN — Medication 2000 UNIT(S): at 13:08

## 2023-01-01 RX ADMIN — Medication 3 MILLILITER(S): at 15:21

## 2023-01-01 RX ADMIN — LEVETIRACETAM 1000 MILLIGRAM(S): 250 TABLET, FILM COATED ORAL at 11:44

## 2023-01-01 RX ADMIN — MEROPENEM 100 MILLIGRAM(S): 1 INJECTION INTRAVENOUS at 05:45

## 2023-01-01 RX ADMIN — HEPARIN SODIUM 10.5 UNIT(S)/HR: 5000 INJECTION INTRAVENOUS; SUBCUTANEOUS at 18:36

## 2023-01-01 RX ADMIN — Medication 2: at 11:47

## 2023-01-01 RX ADMIN — SODIUM CHLORIDE 4 MILLILITER(S): 9 INJECTION INTRAMUSCULAR; INTRAVENOUS; SUBCUTANEOUS at 08:06

## 2023-01-01 RX ADMIN — Medication 0.1 MILLIGRAM(S): at 17:32

## 2023-01-01 RX ADMIN — ATORVASTATIN CALCIUM 40 MILLIGRAM(S): 80 TABLET, FILM COATED ORAL at 21:40

## 2023-01-01 RX ADMIN — Medication 4: at 12:05

## 2023-01-01 RX ADMIN — MIDODRINE HYDROCHLORIDE 40 MILLIGRAM(S): 2.5 TABLET ORAL at 05:08

## 2023-01-01 RX ADMIN — SODIUM CHLORIDE 4 MILLILITER(S): 9 INJECTION INTRAMUSCULAR; INTRAVENOUS; SUBCUTANEOUS at 22:33

## 2023-01-01 RX ADMIN — MIDODRINE HYDROCHLORIDE 40 MILLIGRAM(S): 2.5 TABLET ORAL at 17:15

## 2023-01-01 RX ADMIN — SODIUM CHLORIDE 1 GRAM(S): 9 INJECTION INTRAMUSCULAR; INTRAVENOUS; SUBCUTANEOUS at 17:23

## 2023-01-01 RX ADMIN — Medication 100 MILLIGRAM(S): at 17:14

## 2023-01-01 RX ADMIN — Medication 600 MILLIGRAM(S): at 05:49

## 2023-01-01 RX ADMIN — BUMETANIDE 10 MG/HR: 0.25 INJECTION INTRAMUSCULAR; INTRAVENOUS at 05:49

## 2023-01-01 RX ADMIN — Medication 2: at 17:10

## 2023-01-01 RX ADMIN — Medication 1 APPLICATION(S): at 17:31

## 2023-01-01 RX ADMIN — Medication 3 MILLILITER(S): at 07:55

## 2023-01-01 RX ADMIN — Medication 8: at 11:36

## 2023-01-01 RX ADMIN — Medication 1 DROP(S): at 02:37

## 2023-01-01 RX ADMIN — SODIUM CHLORIDE 4 MILLILITER(S): 9 INJECTION INTRAMUSCULAR; INTRAVENOUS; SUBCUTANEOUS at 04:01

## 2023-01-01 RX ADMIN — SODIUM CHLORIDE 4 MILLILITER(S): 9 INJECTION INTRAMUSCULAR; INTRAVENOUS; SUBCUTANEOUS at 15:58

## 2023-01-01 RX ADMIN — HUMAN INSULIN 6 UNIT(S): 100 INJECTION, SUSPENSION SUBCUTANEOUS at 11:26

## 2023-01-01 RX ADMIN — FLUCONAZOLE 100 MILLIGRAM(S): 150 TABLET ORAL at 23:27

## 2023-01-01 RX ADMIN — SODIUM CHLORIDE 4 MILLILITER(S): 9 INJECTION INTRAMUSCULAR; INTRAVENOUS; SUBCUTANEOUS at 10:26

## 2023-01-01 RX ADMIN — Medication 4 DROP(S): at 05:12

## 2023-01-01 RX ADMIN — Medication 6.23 MICROGRAM(S)/KG/MIN: at 07:50

## 2023-01-01 RX ADMIN — Medication 300 MILLIGRAM(S): at 11:20

## 2023-01-01 RX ADMIN — Medication 25 GRAM(S): at 02:03

## 2023-01-01 RX ADMIN — Medication 6.23 MICROGRAM(S)/KG/MIN: at 20:12

## 2023-01-01 RX ADMIN — ATORVASTATIN CALCIUM 40 MILLIGRAM(S): 80 TABLET, FILM COATED ORAL at 21:31

## 2023-01-01 RX ADMIN — Medication 1000 MILLIGRAM(S): at 02:30

## 2023-01-01 RX ADMIN — DIPHENHYDRAMINE HYDROCHLORIDE AND LIDOCAINE HYDROCHLORIDE AND ALUMINUM HYDROXIDE AND MAGNESIUM HYDRO 10 MILLILITER(S): KIT at 11:39

## 2023-01-01 RX ADMIN — CARVEDILOL PHOSPHATE 12.5 MILLIGRAM(S): 80 CAPSULE, EXTENDED RELEASE ORAL at 05:22

## 2023-01-01 RX ADMIN — Medication 3 MILLILITER(S): at 08:40

## 2023-01-01 RX ADMIN — HEPARIN SODIUM 5000 UNIT(S): 5000 INJECTION INTRAVENOUS; SUBCUTANEOUS at 14:55

## 2023-01-01 RX ADMIN — LEVETIRACETAM 250 MILLIGRAM(S): 250 TABLET, FILM COATED ORAL at 19:37

## 2023-01-01 RX ADMIN — Medication 3 MILLILITER(S): at 09:20

## 2023-01-01 RX ADMIN — Medication 1 APPLICATION(S): at 11:34

## 2023-01-01 RX ADMIN — SODIUM CHLORIDE 4 MILLILITER(S): 9 INJECTION INTRAMUSCULAR; INTRAVENOUS; SUBCUTANEOUS at 21:58

## 2023-01-01 RX ADMIN — FENTANYL CITRATE 100 MICROGRAM(S): 50 INJECTION INTRAVENOUS at 11:37

## 2023-01-01 RX ADMIN — APIXABAN 5 MILLIGRAM(S): 2.5 TABLET, FILM COATED ORAL at 05:03

## 2023-01-01 RX ADMIN — Medication 1 DROP(S): at 15:17

## 2023-01-01 RX ADMIN — DIPHENHYDRAMINE HYDROCHLORIDE AND LIDOCAINE HYDROCHLORIDE AND ALUMINUM HYDROXIDE AND MAGNESIUM HYDRO 10 MILLILITER(S): KIT at 12:29

## 2023-01-01 RX ADMIN — HUMAN INSULIN 6 UNIT(S): 100 INJECTION, SUSPENSION SUBCUTANEOUS at 11:32

## 2023-01-01 RX ADMIN — MIDODRINE HYDROCHLORIDE 30 MILLIGRAM(S): 2.5 TABLET ORAL at 23:53

## 2023-01-01 RX ADMIN — DROXIDOPA 600 MILLIGRAM(S): 100 CAPSULE ORAL at 02:35

## 2023-01-01 RX ADMIN — HUMAN INSULIN 3 UNIT(S): 100 INJECTION, SUSPENSION SUBCUTANEOUS at 05:52

## 2023-01-01 RX ADMIN — Medication 4: at 23:49

## 2023-01-01 RX ADMIN — ALBUTEROL 2.5 MILLIGRAM(S): 90 AEROSOL, METERED ORAL at 15:51

## 2023-01-01 RX ADMIN — Medication 1 DROP(S): at 17:12

## 2023-01-01 RX ADMIN — HEPARIN SODIUM 1200 UNIT(S)/HR: 5000 INJECTION INTRAVENOUS; SUBCUTANEOUS at 13:50

## 2023-01-01 RX ADMIN — DEXMEDETOMIDINE HYDROCHLORIDE IN 0.9% SODIUM CHLORIDE 3.33 MICROGRAM(S)/KG/HR: 4 INJECTION INTRAVENOUS at 14:00

## 2023-01-01 RX ADMIN — Medication 40 MILLIEQUIVALENT(S): at 04:13

## 2023-01-01 RX ADMIN — HUMAN INSULIN 5 UNIT(S): 100 INJECTION, SUSPENSION SUBCUTANEOUS at 23:35

## 2023-01-01 RX ADMIN — ALBUTEROL 2.5 MILLIGRAM(S): 90 AEROSOL, METERED ORAL at 09:23

## 2023-01-01 RX ADMIN — HUMAN INSULIN 4 UNIT(S): 100 INJECTION, SUSPENSION SUBCUTANEOUS at 05:43

## 2023-01-01 RX ADMIN — ERYTHROPOIETIN 10000 UNIT(S): 10000 INJECTION, SOLUTION INTRAVENOUS; SUBCUTANEOUS at 20:24

## 2023-01-01 RX ADMIN — HUMAN INSULIN 6 UNIT(S): 100 INJECTION, SUSPENSION SUBCUTANEOUS at 11:49

## 2023-01-01 RX ADMIN — MIDODRINE HYDROCHLORIDE 40 MILLIGRAM(S): 2.5 TABLET ORAL at 05:24

## 2023-01-01 RX ADMIN — Medication 3 MILLILITER(S): at 10:10

## 2023-01-01 RX ADMIN — DIPHENHYDRAMINE HYDROCHLORIDE AND LIDOCAINE HYDROCHLORIDE AND ALUMINUM HYDROXIDE AND MAGNESIUM HYDRO 10 MILLILITER(S): KIT at 00:21

## 2023-01-01 RX ADMIN — DROXIDOPA 600 MILLIGRAM(S): 100 CAPSULE ORAL at 19:00

## 2023-01-01 RX ADMIN — CHLORHEXIDINE GLUCONATE 1 APPLICATION(S): 213 SOLUTION TOPICAL at 05:28

## 2023-01-01 RX ADMIN — Medication 1 DROP(S): at 22:30

## 2023-01-01 RX ADMIN — DROXIDOPA 600 MILLIGRAM(S): 100 CAPSULE ORAL at 11:09

## 2023-01-01 RX ADMIN — Medication 1 DROP(S): at 02:40

## 2023-01-01 RX ADMIN — Medication 6.23 MICROGRAM(S)/KG/MIN: at 21:26

## 2023-01-01 RX ADMIN — SEVELAMER CARBONATE 1600 MILLIGRAM(S): 2400 POWDER, FOR SUSPENSION ORAL at 21:27

## 2023-01-01 RX ADMIN — LEVETIRACETAM 250 MILLIGRAM(S): 250 TABLET, FILM COATED ORAL at 20:07

## 2023-01-01 RX ADMIN — Medication 1 DROP(S): at 18:33

## 2023-01-01 RX ADMIN — SODIUM CHLORIDE 1 GRAM(S): 9 INJECTION INTRAMUSCULAR; INTRAVENOUS; SUBCUTANEOUS at 06:43

## 2023-01-01 RX ADMIN — HUMAN INSULIN 3 UNIT(S): 100 INJECTION, SUSPENSION SUBCUTANEOUS at 23:37

## 2023-01-01 RX ADMIN — SODIUM CHLORIDE 4 MILLILITER(S): 9 INJECTION INTRAMUSCULAR; INTRAVENOUS; SUBCUTANEOUS at 09:18

## 2023-01-01 RX ADMIN — Medication 2: at 22:10

## 2023-01-01 RX ADMIN — DROXIDOPA 600 MILLIGRAM(S): 100 CAPSULE ORAL at 17:42

## 2023-01-01 RX ADMIN — Medication 1 DROP(S): at 01:11

## 2023-01-01 RX ADMIN — Medication 500 MILLIGRAM(S): at 21:35

## 2023-01-01 RX ADMIN — Medication 100 MILLIGRAM(S): at 22:40

## 2023-01-01 RX ADMIN — MIDODRINE HYDROCHLORIDE 40 MILLIGRAM(S): 2.5 TABLET ORAL at 17:19

## 2023-01-01 RX ADMIN — Medication 40 MILLIGRAM(S): at 21:58

## 2023-01-01 RX ADMIN — Medication 0.1 MILLIGRAM(S): at 05:15

## 2023-01-01 RX ADMIN — MIDODRINE HYDROCHLORIDE 40 MILLIGRAM(S): 2.5 TABLET ORAL at 23:27

## 2023-01-01 RX ADMIN — HUMAN INSULIN 6 UNIT(S): 100 INJECTION, SUSPENSION SUBCUTANEOUS at 12:51

## 2023-01-01 RX ADMIN — CHLORHEXIDINE GLUCONATE 15 MILLILITER(S): 213 SOLUTION TOPICAL at 05:28

## 2023-01-01 RX ADMIN — Medication 300 MILLIGRAM(S): at 11:41

## 2023-01-01 RX ADMIN — Medication 4: at 23:58

## 2023-01-01 RX ADMIN — ALBUTEROL 2.5 MILLIGRAM(S): 90 AEROSOL, METERED ORAL at 21:12

## 2023-01-01 RX ADMIN — HEPARIN SODIUM 5000 UNIT(S): 5000 INJECTION INTRAVENOUS; SUBCUTANEOUS at 22:25

## 2023-01-01 RX ADMIN — Medication 3 MILLILITER(S): at 21:01

## 2023-01-01 RX ADMIN — ALBUTEROL 2.5 MILLIGRAM(S): 90 AEROSOL, METERED ORAL at 22:53

## 2023-01-01 RX ADMIN — LEVETIRACETAM 1000 MILLIGRAM(S): 250 TABLET, FILM COATED ORAL at 23:14

## 2023-01-01 RX ADMIN — Medication 6 MILLIGRAM(S): at 08:12

## 2023-01-01 RX ADMIN — CHLORHEXIDINE GLUCONATE 15 MILLILITER(S): 213 SOLUTION TOPICAL at 05:16

## 2023-01-01 RX ADMIN — PANTOPRAZOLE SODIUM 40 MILLIGRAM(S): 20 TABLET, DELAYED RELEASE ORAL at 05:53

## 2023-01-01 RX ADMIN — Medication 650 MILLIGRAM(S): at 05:29

## 2023-01-01 RX ADMIN — MEROPENEM 100 MILLIGRAM(S): 1 INJECTION INTRAVENOUS at 18:03

## 2023-01-01 RX ADMIN — SODIUM CHLORIDE 1 GRAM(S): 9 INJECTION INTRAMUSCULAR; INTRAVENOUS; SUBCUTANEOUS at 18:47

## 2023-01-01 RX ADMIN — Medication 6.23 MICROGRAM(S)/KG/MIN: at 19:40

## 2023-01-01 RX ADMIN — HUMAN INSULIN 1 UNIT(S): 100 INJECTION, SUSPENSION SUBCUTANEOUS at 18:32

## 2023-01-01 RX ADMIN — Medication 1 DROP(S): at 17:45

## 2023-01-01 RX ADMIN — Medication 1 DROP(S): at 06:26

## 2023-01-01 RX ADMIN — Medication 2: at 18:08

## 2023-01-01 RX ADMIN — Medication 3 MILLILITER(S): at 09:26

## 2023-01-01 RX ADMIN — Medication 6 MILLIGRAM(S): at 20:02

## 2023-01-01 RX ADMIN — SODIUM CHLORIDE 4 MILLILITER(S): 9 INJECTION INTRAMUSCULAR; INTRAVENOUS; SUBCUTANEOUS at 03:56

## 2023-01-01 RX ADMIN — CHLORHEXIDINE GLUCONATE 15 MILLILITER(S): 213 SOLUTION TOPICAL at 18:35

## 2023-01-01 RX ADMIN — LEVETIRACETAM 1000 MILLIGRAM(S): 250 TABLET, FILM COATED ORAL at 17:47

## 2023-01-01 RX ADMIN — SODIUM CHLORIDE 4 MILLILITER(S): 9 INJECTION INTRAMUSCULAR; INTRAVENOUS; SUBCUTANEOUS at 10:42

## 2023-01-01 RX ADMIN — Medication 81 MILLIGRAM(S): at 12:57

## 2023-01-01 RX ADMIN — Medication 1 APPLICATION(S): at 05:52

## 2023-01-01 RX ADMIN — SODIUM CHLORIDE 1 GRAM(S): 9 INJECTION INTRAMUSCULAR; INTRAVENOUS; SUBCUTANEOUS at 05:20

## 2023-01-01 RX ADMIN — PANTOPRAZOLE SODIUM 40 MILLIGRAM(S): 20 TABLET, DELAYED RELEASE ORAL at 17:16

## 2023-01-01 RX ADMIN — ALBUTEROL 2.5 MILLIGRAM(S): 90 AEROSOL, METERED ORAL at 16:04

## 2023-01-01 RX ADMIN — MIDODRINE HYDROCHLORIDE 40 MILLIGRAM(S): 2.5 TABLET ORAL at 12:32

## 2023-01-01 RX ADMIN — DROXIDOPA 600 MILLIGRAM(S): 100 CAPSULE ORAL at 05:02

## 2023-01-01 RX ADMIN — Medication 2: at 17:40

## 2023-01-01 RX ADMIN — SODIUM CHLORIDE 3 MILLILITER(S): 9 INJECTION INTRAMUSCULAR; INTRAVENOUS; SUBCUTANEOUS at 06:28

## 2023-01-01 RX ADMIN — SODIUM CHLORIDE 1 GRAM(S): 9 INJECTION INTRAMUSCULAR; INTRAVENOUS; SUBCUTANEOUS at 17:35

## 2023-01-01 RX ADMIN — Medication 1 DROP(S): at 10:16

## 2023-01-01 RX ADMIN — Medication 1 DROP(S): at 10:20

## 2023-01-01 RX ADMIN — MIDODRINE HYDROCHLORIDE 40 MILLIGRAM(S): 2.5 TABLET ORAL at 11:33

## 2023-01-01 RX ADMIN — MIDODRINE HYDROCHLORIDE 40 MILLIGRAM(S): 2.5 TABLET ORAL at 19:01

## 2023-01-01 RX ADMIN — SODIUM CHLORIDE 3 MILLILITER(S): 9 INJECTION INTRAMUSCULAR; INTRAVENOUS; SUBCUTANEOUS at 12:59

## 2023-01-01 RX ADMIN — SODIUM CHLORIDE 1 GRAM(S): 9 INJECTION INTRAMUSCULAR; INTRAVENOUS; SUBCUTANEOUS at 17:17

## 2023-01-01 RX ADMIN — ALBUTEROL 2.5 MILLIGRAM(S): 90 AEROSOL, METERED ORAL at 16:29

## 2023-01-01 RX ADMIN — HUMAN INSULIN 6 UNIT(S): 100 INJECTION, SUSPENSION SUBCUTANEOUS at 11:21

## 2023-01-01 RX ADMIN — Medication 40 MILLIGRAM(S): at 13:18

## 2023-01-01 RX ADMIN — CHLORHEXIDINE GLUCONATE 15 MILLILITER(S): 213 SOLUTION TOPICAL at 17:11

## 2023-01-01 RX ADMIN — Medication 1 DROP(S): at 17:20

## 2023-01-01 RX ADMIN — Medication 1 DROP(S): at 05:13

## 2023-01-01 RX ADMIN — MIDODRINE HYDROCHLORIDE 40 MILLIGRAM(S): 2.5 TABLET ORAL at 23:31

## 2023-01-01 RX ADMIN — Medication 3 MILLILITER(S): at 03:35

## 2023-01-01 RX ADMIN — ALBUTEROL 2.5 MILLIGRAM(S): 90 AEROSOL, METERED ORAL at 16:05

## 2023-01-01 RX ADMIN — SODIUM CHLORIDE 3 MILLILITER(S): 9 INJECTION INTRAMUSCULAR; INTRAVENOUS; SUBCUTANEOUS at 17:19

## 2023-01-01 RX ADMIN — MIDODRINE HYDROCHLORIDE 40 MILLIGRAM(S): 2.5 TABLET ORAL at 17:33

## 2023-01-01 RX ADMIN — HEPARIN SODIUM 5000 UNIT(S): 5000 INJECTION INTRAVENOUS; SUBCUTANEOUS at 06:54

## 2023-01-01 RX ADMIN — Medication 1 DROP(S): at 18:39

## 2023-01-01 RX ADMIN — MIDODRINE HYDROCHLORIDE 40 MILLIGRAM(S): 2.5 TABLET ORAL at 05:46

## 2023-01-01 RX ADMIN — BUMETANIDE 2 MILLIGRAM(S): 0.25 INJECTION INTRAMUSCULAR; INTRAVENOUS at 05:26

## 2023-01-01 RX ADMIN — CHLORHEXIDINE GLUCONATE 15 MILLILITER(S): 213 SOLUTION TOPICAL at 05:38

## 2023-01-01 RX ADMIN — Medication 10: at 00:21

## 2023-01-01 RX ADMIN — PANTOPRAZOLE SODIUM 40 MILLIGRAM(S): 20 TABLET, DELAYED RELEASE ORAL at 05:14

## 2023-01-01 RX ADMIN — SODIUM CHLORIDE 4 MILLILITER(S): 9 INJECTION INTRAMUSCULAR; INTRAVENOUS; SUBCUTANEOUS at 22:05

## 2023-01-01 RX ADMIN — Medication 1 APPLICATION(S): at 05:30

## 2023-01-01 RX ADMIN — CHLORHEXIDINE GLUCONATE 15 MILLILITER(S): 213 SOLUTION TOPICAL at 17:32

## 2023-01-01 RX ADMIN — Medication 1 APPLICATION(S): at 06:20

## 2023-01-01 RX ADMIN — Medication 1 DROP(S): at 22:02

## 2023-01-01 RX ADMIN — Medication 0.1 MILLIGRAM(S): at 18:36

## 2023-01-01 RX ADMIN — ATORVASTATIN CALCIUM 10 MILLIGRAM(S): 80 TABLET, FILM COATED ORAL at 21:48

## 2023-01-01 RX ADMIN — Medication 1 APPLICATION(S): at 05:06

## 2023-01-01 RX ADMIN — Medication 1 APPLICATION(S): at 17:39

## 2023-01-01 RX ADMIN — Medication 4 DROP(S): at 18:03

## 2023-01-01 RX ADMIN — PANTOPRAZOLE SODIUM 40 MILLIGRAM(S): 20 TABLET, DELAYED RELEASE ORAL at 05:15

## 2023-01-01 RX ADMIN — Medication 3 UNIT(S): at 16:35

## 2023-01-01 RX ADMIN — SODIUM CHLORIDE 4 MILLILITER(S): 9 INJECTION INTRAMUSCULAR; INTRAVENOUS; SUBCUTANEOUS at 02:50

## 2023-01-01 RX ADMIN — Medication 1 DROP(S): at 05:16

## 2023-01-01 RX ADMIN — MEROPENEM 100 MILLIGRAM(S): 1 INJECTION INTRAVENOUS at 05:44

## 2023-01-01 RX ADMIN — CHLORHEXIDINE GLUCONATE 15 MILLILITER(S): 213 SOLUTION TOPICAL at 17:12

## 2023-01-01 RX ADMIN — Medication 300 MILLIGRAM(S): at 12:40

## 2023-01-01 RX ADMIN — HEPARIN SODIUM 9.5 UNIT(S)/HR: 5000 INJECTION INTRAVENOUS; SUBCUTANEOUS at 19:36

## 2023-01-01 RX ADMIN — Medication 50 MILLIGRAM(S): at 00:58

## 2023-01-01 RX ADMIN — Medication 2: at 23:52

## 2023-01-01 RX ADMIN — Medication 500 MILLIGRAM(S): at 05:11

## 2023-01-01 RX ADMIN — Medication 1 DROP(S): at 05:55

## 2023-01-01 RX ADMIN — Medication 3 MILLILITER(S): at 08:33

## 2023-01-01 RX ADMIN — Medication 10 MILLIGRAM(S): at 06:03

## 2023-01-01 RX ADMIN — BUMETANIDE 132 MILLIGRAM(S): 0.25 INJECTION INTRAMUSCULAR; INTRAVENOUS at 10:28

## 2023-01-01 RX ADMIN — PANTOPRAZOLE SODIUM 40 MILLIGRAM(S): 20 TABLET, DELAYED RELEASE ORAL at 18:31

## 2023-01-01 RX ADMIN — CHLORHEXIDINE GLUCONATE 15 MILLILITER(S): 213 SOLUTION TOPICAL at 17:19

## 2023-01-01 RX ADMIN — LEVETIRACETAM 1000 MILLIGRAM(S): 250 TABLET, FILM COATED ORAL at 14:27

## 2023-01-01 RX ADMIN — HUMAN INSULIN 6 UNIT(S): 100 INJECTION, SUSPENSION SUBCUTANEOUS at 18:38

## 2023-01-01 RX ADMIN — Medication 2: at 06:09

## 2023-01-01 RX ADMIN — SODIUM CHLORIDE 2 GRAM(S): 9 INJECTION INTRAMUSCULAR; INTRAVENOUS; SUBCUTANEOUS at 17:58

## 2023-01-01 RX ADMIN — HUMAN INSULIN 6 UNIT(S): 100 INJECTION, SUSPENSION SUBCUTANEOUS at 11:54

## 2023-01-01 RX ADMIN — PANTOPRAZOLE SODIUM 40 MILLIGRAM(S): 20 TABLET, DELAYED RELEASE ORAL at 05:03

## 2023-01-01 RX ADMIN — Medication 650 MILLIGRAM(S): at 23:15

## 2023-01-01 RX ADMIN — HUMAN INSULIN 6 UNIT(S): 100 INJECTION, SUSPENSION SUBCUTANEOUS at 11:24

## 2023-01-01 RX ADMIN — Medication 1 APPLICATION(S): at 05:31

## 2023-01-01 RX ADMIN — Medication 1 DROP(S): at 05:31

## 2023-01-01 RX ADMIN — Medication 1 APPLICATION(S): at 00:26

## 2023-01-01 RX ADMIN — HUMAN INSULIN 6 UNIT(S): 100 INJECTION, SUSPENSION SUBCUTANEOUS at 23:59

## 2023-01-01 RX ADMIN — Medication 2000 UNIT(S): at 12:06

## 2023-01-01 RX ADMIN — POLYETHYLENE GLYCOL 3350 17 GRAM(S): 17 POWDER, FOR SOLUTION ORAL at 18:18

## 2023-01-01 RX ADMIN — CHLORHEXIDINE GLUCONATE 1 APPLICATION(S): 213 SOLUTION TOPICAL at 05:04

## 2023-01-01 RX ADMIN — Medication 81 MILLIGRAM(S): at 11:23

## 2023-01-01 RX ADMIN — ALBUTEROL 2.5 MILLIGRAM(S): 90 AEROSOL, METERED ORAL at 03:23

## 2023-01-01 RX ADMIN — HUMAN INSULIN 6 UNIT(S): 100 INJECTION, SUSPENSION SUBCUTANEOUS at 11:27

## 2023-01-01 RX ADMIN — Medication 3 MILLILITER(S): at 09:14

## 2023-01-01 RX ADMIN — APIXABAN 5 MILLIGRAM(S): 2.5 TABLET, FILM COATED ORAL at 18:17

## 2023-01-01 RX ADMIN — Medication 3 MILLILITER(S): at 15:47

## 2023-01-01 RX ADMIN — HUMAN INSULIN 3 UNIT(S): 100 INJECTION, SUSPENSION SUBCUTANEOUS at 12:31

## 2023-01-01 RX ADMIN — ALBUTEROL 2.5 MILLIGRAM(S): 90 AEROSOL, METERED ORAL at 04:09

## 2023-01-01 RX ADMIN — Medication 400 MILLIGRAM(S): at 17:48

## 2023-01-01 RX ADMIN — CHLORHEXIDINE GLUCONATE 15 MILLILITER(S): 213 SOLUTION TOPICAL at 17:40

## 2023-01-01 RX ADMIN — APIXABAN 10 MILLIGRAM(S): 2.5 TABLET, FILM COATED ORAL at 17:09

## 2023-01-01 RX ADMIN — Medication 2: at 05:42

## 2023-01-01 RX ADMIN — MIDODRINE HYDROCHLORIDE 40 MILLIGRAM(S): 2.5 TABLET ORAL at 13:11

## 2023-01-01 RX ADMIN — Medication 2: at 06:15

## 2023-01-01 RX ADMIN — POLYETHYLENE GLYCOL 3350 17 GRAM(S): 17 POWDER, FOR SOLUTION ORAL at 05:14

## 2023-01-01 RX ADMIN — PANTOPRAZOLE SODIUM 40 MILLIGRAM(S): 20 TABLET, DELAYED RELEASE ORAL at 05:04

## 2023-01-01 RX ADMIN — ALBUTEROL 2.5 MILLIGRAM(S): 90 AEROSOL, METERED ORAL at 21:37

## 2023-01-01 RX ADMIN — Medication 8 UNIT(S): at 12:30

## 2023-01-01 RX ADMIN — HUMAN INSULIN 5 UNIT(S): 100 INJECTION, SUSPENSION SUBCUTANEOUS at 05:26

## 2023-01-01 RX ADMIN — HUMAN INSULIN 3 UNIT(S): 100 INJECTION, SUSPENSION SUBCUTANEOUS at 00:22

## 2023-01-01 RX ADMIN — APIXABAN 5 MILLIGRAM(S): 2.5 TABLET, FILM COATED ORAL at 05:16

## 2023-01-01 RX ADMIN — Medication 1 DROP(S): at 14:16

## 2023-01-01 RX ADMIN — DROXIDOPA 200 MILLIGRAM(S): 100 CAPSULE ORAL at 17:38

## 2023-01-01 RX ADMIN — Medication 300 MILLIGRAM(S): at 12:11

## 2023-01-01 RX ADMIN — SODIUM CHLORIDE 4 MILLILITER(S): 9 INJECTION INTRAMUSCULAR; INTRAVENOUS; SUBCUTANEOUS at 15:31

## 2023-01-01 RX ADMIN — SODIUM CHLORIDE 1 GRAM(S): 9 INJECTION INTRAMUSCULAR; INTRAVENOUS; SUBCUTANEOUS at 17:34

## 2023-01-01 RX ADMIN — HUMAN INSULIN 6 UNIT(S): 100 INJECTION, SUSPENSION SUBCUTANEOUS at 18:07

## 2023-01-01 RX ADMIN — ALBUTEROL 2.5 MILLIGRAM(S): 90 AEROSOL, METERED ORAL at 21:27

## 2023-01-01 RX ADMIN — MIDODRINE HYDROCHLORIDE 40 MILLIGRAM(S): 2.5 TABLET ORAL at 11:08

## 2023-01-01 RX ADMIN — Medication 650 MILLIGRAM(S): at 08:58

## 2023-01-01 RX ADMIN — Medication 1 DROP(S): at 13:06

## 2023-01-01 RX ADMIN — Medication 1 DROP(S): at 06:43

## 2023-01-01 RX ADMIN — Medication 2: at 17:58

## 2023-01-01 RX ADMIN — ALBUTEROL 2.5 MILLIGRAM(S): 90 AEROSOL, METERED ORAL at 15:53

## 2023-01-01 RX ADMIN — CISATRACURIUM BESYLATE 20 MILLIGRAM(S): 2 INJECTION INTRAVENOUS at 22:44

## 2023-01-01 RX ADMIN — Medication 650 MILLIGRAM(S): at 09:27

## 2023-01-01 RX ADMIN — SODIUM CHLORIDE 4 MILLILITER(S): 9 INJECTION INTRAMUSCULAR; INTRAVENOUS; SUBCUTANEOUS at 21:48

## 2023-01-01 RX ADMIN — Medication 600 MILLIGRAM(S): at 14:48

## 2023-01-01 RX ADMIN — MIDODRINE HYDROCHLORIDE 30 MILLIGRAM(S): 2.5 TABLET ORAL at 05:48

## 2023-01-01 RX ADMIN — DROXIDOPA 600 MILLIGRAM(S): 100 CAPSULE ORAL at 10:11

## 2023-01-01 RX ADMIN — HUMAN INSULIN 4 UNIT(S): 100 INJECTION, SUSPENSION SUBCUTANEOUS at 17:24

## 2023-01-01 RX ADMIN — MEROPENEM 100 MILLIGRAM(S): 1 INJECTION INTRAVENOUS at 11:38

## 2023-01-01 RX ADMIN — Medication 25 GRAM(S): at 23:03

## 2023-01-01 RX ADMIN — Medication 1 DROP(S): at 16:16

## 2023-01-01 RX ADMIN — Medication 125 MILLILITER(S): at 07:34

## 2023-01-01 RX ADMIN — PANTOPRAZOLE SODIUM 40 MILLIGRAM(S): 20 TABLET, DELAYED RELEASE ORAL at 05:48

## 2023-01-01 RX ADMIN — Medication 1 APPLICATION(S): at 18:20

## 2023-01-01 RX ADMIN — MIDODRINE HYDROCHLORIDE 40 MILLIGRAM(S): 2.5 TABLET ORAL at 23:29

## 2023-01-01 RX ADMIN — ATORVASTATIN CALCIUM 40 MILLIGRAM(S): 80 TABLET, FILM COATED ORAL at 21:55

## 2023-01-01 RX ADMIN — HEPARIN SODIUM 5000 UNIT(S): 5000 INJECTION INTRAVENOUS; SUBCUTANEOUS at 15:17

## 2023-01-01 RX ADMIN — SODIUM CHLORIDE 1 GRAM(S): 9 INJECTION INTRAMUSCULAR; INTRAVENOUS; SUBCUTANEOUS at 17:42

## 2023-01-01 RX ADMIN — CHLORHEXIDINE GLUCONATE 15 MILLILITER(S): 213 SOLUTION TOPICAL at 06:44

## 2023-01-01 RX ADMIN — CHLORHEXIDINE GLUCONATE 15 MILLILITER(S): 213 SOLUTION TOPICAL at 05:47

## 2023-01-01 RX ADMIN — ALBUTEROL 2.5 MILLIGRAM(S): 90 AEROSOL, METERED ORAL at 15:48

## 2023-01-01 RX ADMIN — SODIUM CHLORIDE 1 GRAM(S): 9 INJECTION INTRAMUSCULAR; INTRAVENOUS; SUBCUTANEOUS at 05:33

## 2023-01-01 RX ADMIN — Medication 600 MILLIGRAM(S): at 06:02

## 2023-01-01 RX ADMIN — Medication 50 MILLILITER(S): at 17:00

## 2023-01-01 RX ADMIN — Medication 4: at 17:38

## 2023-01-01 RX ADMIN — PANTOPRAZOLE SODIUM 40 MILLIGRAM(S): 20 TABLET, DELAYED RELEASE ORAL at 17:18

## 2023-01-01 RX ADMIN — SODIUM CHLORIDE 4 MILLILITER(S): 9 INJECTION INTRAMUSCULAR; INTRAVENOUS; SUBCUTANEOUS at 03:12

## 2023-01-01 RX ADMIN — Medication 81 MILLIGRAM(S): at 13:37

## 2023-01-01 RX ADMIN — Medication 600 MILLIGRAM(S): at 05:53

## 2023-01-01 RX ADMIN — APIXABAN 5 MILLIGRAM(S): 2.5 TABLET, FILM COATED ORAL at 17:42

## 2023-01-01 RX ADMIN — ATORVASTATIN CALCIUM 10 MILLIGRAM(S): 80 TABLET, FILM COATED ORAL at 22:49

## 2023-01-01 RX ADMIN — Medication 1 APPLICATION(S): at 19:01

## 2023-01-01 RX ADMIN — CHLORHEXIDINE GLUCONATE 15 MILLILITER(S): 213 SOLUTION TOPICAL at 18:51

## 2023-01-01 RX ADMIN — CHLORHEXIDINE GLUCONATE 15 MILLILITER(S): 213 SOLUTION TOPICAL at 17:45

## 2023-01-01 RX ADMIN — Medication 1 DROP(S): at 17:30

## 2023-01-01 RX ADMIN — MIDODRINE HYDROCHLORIDE 20 MILLIGRAM(S): 2.5 TABLET ORAL at 18:53

## 2023-01-01 RX ADMIN — ATORVASTATIN CALCIUM 10 MILLIGRAM(S): 80 TABLET, FILM COATED ORAL at 21:09

## 2023-01-01 RX ADMIN — Medication 4: at 06:00

## 2023-01-01 RX ADMIN — Medication 2: at 05:43

## 2023-01-01 RX ADMIN — CHLORHEXIDINE GLUCONATE 15 MILLILITER(S): 213 SOLUTION TOPICAL at 17:48

## 2023-01-01 RX ADMIN — Medication 3 MILLILITER(S): at 22:04

## 2023-01-01 RX ADMIN — MIDODRINE HYDROCHLORIDE 10 MILLIGRAM(S): 2.5 TABLET ORAL at 12:10

## 2023-01-01 RX ADMIN — MEROPENEM 100 MILLIGRAM(S): 1 INJECTION INTRAVENOUS at 05:28

## 2023-01-01 RX ADMIN — ERYTHROPOIETIN 10000 UNIT(S): 10000 INJECTION, SOLUTION INTRAVENOUS; SUBCUTANEOUS at 16:55

## 2023-01-01 RX ADMIN — Medication 1 APPLICATION(S): at 23:32

## 2023-01-01 RX ADMIN — PANTOPRAZOLE SODIUM 40 MILLIGRAM(S): 20 TABLET, DELAYED RELEASE ORAL at 17:24

## 2023-01-01 RX ADMIN — Medication 1 DROP(S): at 15:58

## 2023-01-01 RX ADMIN — Medication 3 MILLILITER(S): at 22:27

## 2023-01-01 RX ADMIN — Medication 1 DROP(S): at 21:58

## 2023-01-01 RX ADMIN — Medication 2: at 23:47

## 2023-01-01 RX ADMIN — NICARDIPINE HYDROCHLORIDE 25 MG/HR: 30 CAPSULE, EXTENDED RELEASE ORAL at 07:28

## 2023-01-01 RX ADMIN — Medication 9 UNIT(S): at 17:31

## 2023-01-01 RX ADMIN — MIDODRINE HYDROCHLORIDE 40 MILLIGRAM(S): 2.5 TABLET ORAL at 05:30

## 2023-01-01 RX ADMIN — SODIUM CHLORIDE 2 GRAM(S): 9 INJECTION INTRAMUSCULAR; INTRAVENOUS; SUBCUTANEOUS at 17:12

## 2023-01-01 RX ADMIN — Medication 1 APPLICATION(S): at 00:14

## 2023-01-01 RX ADMIN — Medication 6: at 11:44

## 2023-01-01 RX ADMIN — SODIUM CHLORIDE 4 MILLILITER(S): 9 INJECTION INTRAMUSCULAR; INTRAVENOUS; SUBCUTANEOUS at 22:50

## 2023-01-01 RX ADMIN — ATORVASTATIN CALCIUM 10 MILLIGRAM(S): 80 TABLET, FILM COATED ORAL at 21:42

## 2023-01-01 RX ADMIN — SODIUM CHLORIDE 4 MILLILITER(S): 9 INJECTION INTRAMUSCULAR; INTRAVENOUS; SUBCUTANEOUS at 15:32

## 2023-01-01 RX ADMIN — Medication 4: at 23:56

## 2023-01-01 RX ADMIN — Medication 3 MILLILITER(S): at 17:00

## 2023-01-01 RX ADMIN — SODIUM CHLORIDE 4 MILLILITER(S): 9 INJECTION INTRAMUSCULAR; INTRAVENOUS; SUBCUTANEOUS at 22:20

## 2023-01-01 RX ADMIN — SEVELAMER CARBONATE 1600 MILLIGRAM(S): 2400 POWDER, FOR SUSPENSION ORAL at 05:14

## 2023-01-01 RX ADMIN — DROXIDOPA 600 MILLIGRAM(S): 100 CAPSULE ORAL at 16:58

## 2023-01-01 RX ADMIN — Medication 1 DROP(S): at 13:36

## 2023-01-01 RX ADMIN — Medication 12.5 MILLIGRAM(S): at 21:53

## 2023-01-01 RX ADMIN — CARVEDILOL PHOSPHATE 37.5 MILLIGRAM(S): 80 CAPSULE, EXTENDED RELEASE ORAL at 06:36

## 2023-01-01 RX ADMIN — Medication 0.2 MILLIGRAM(S): at 11:23

## 2023-01-01 RX ADMIN — Medication 650 MILLIGRAM(S): at 22:33

## 2023-01-01 RX ADMIN — Medication 2: at 18:11

## 2023-01-01 RX ADMIN — Medication 1 DROP(S): at 05:22

## 2023-01-01 RX ADMIN — Medication 3 MILLILITER(S): at 19:50

## 2023-01-01 RX ADMIN — Medication 2: at 18:34

## 2023-01-01 RX ADMIN — Medication 25 GRAM(S): at 04:14

## 2023-01-01 RX ADMIN — Medication 1 APPLICATION(S): at 17:25

## 2023-01-01 RX ADMIN — Medication 2: at 00:10

## 2023-01-01 RX ADMIN — Medication 2: at 12:46

## 2023-01-01 RX ADMIN — FLUCONAZOLE 100 MILLIGRAM(S): 150 TABLET ORAL at 21:58

## 2023-01-01 RX ADMIN — Medication 1 APPLICATION(S): at 11:28

## 2023-01-01 RX ADMIN — APIXABAN 5 MILLIGRAM(S): 2.5 TABLET, FILM COATED ORAL at 05:24

## 2023-01-01 RX ADMIN — ALBUTEROL 2.5 MILLIGRAM(S): 90 AEROSOL, METERED ORAL at 22:56

## 2023-01-01 RX ADMIN — HEPARIN SODIUM 5000 UNIT(S): 5000 INJECTION INTRAVENOUS; SUBCUTANEOUS at 21:16

## 2023-01-01 RX ADMIN — DROXIDOPA 600 MILLIGRAM(S): 100 CAPSULE ORAL at 17:28

## 2023-01-01 RX ADMIN — SEVELAMER CARBONATE 1600 MILLIGRAM(S): 2400 POWDER, FOR SUSPENSION ORAL at 17:48

## 2023-01-01 RX ADMIN — NICARDIPINE HYDROCHLORIDE 12.5 MG/HR: 30 CAPSULE, EXTENDED RELEASE ORAL at 17:48

## 2023-01-01 RX ADMIN — POLYMYXIN B SULFATE 250 UNIT(S): 500000 INJECTION, POWDER, LYOPHILIZED, FOR SOLUTION INTRAMUSCULAR; INTRATHECAL; INTRAVENOUS; OPHTHALMIC at 06:08

## 2023-01-01 RX ADMIN — Medication 1 APPLICATION(S): at 22:49

## 2023-01-01 RX ADMIN — Medication 3 MILLILITER(S): at 15:53

## 2023-01-01 RX ADMIN — Medication 2: at 06:32

## 2023-01-01 RX ADMIN — SODIUM CHLORIDE 1 GRAM(S): 9 INJECTION INTRAMUSCULAR; INTRAVENOUS; SUBCUTANEOUS at 18:24

## 2023-01-01 RX ADMIN — HUMAN INSULIN 3 UNIT(S): 100 INJECTION, SUSPENSION SUBCUTANEOUS at 05:46

## 2023-01-01 RX ADMIN — Medication 1 APPLICATION(S): at 18:11

## 2023-01-01 RX ADMIN — Medication 1 APPLICATION(S): at 05:17

## 2023-01-01 RX ADMIN — Medication 1 DROP(S): at 18:36

## 2023-01-01 RX ADMIN — Medication 600 MILLIGRAM(S): at 14:17

## 2023-01-01 RX ADMIN — HUMAN INSULIN 6 UNIT(S): 100 INJECTION, SUSPENSION SUBCUTANEOUS at 23:01

## 2023-01-01 RX ADMIN — Medication 300 MILLIGRAM(S): at 11:21

## 2023-01-01 RX ADMIN — Medication 1 APPLICATION(S): at 06:49

## 2023-01-01 RX ADMIN — Medication 100 MILLIGRAM(S): at 14:00

## 2023-01-01 RX ADMIN — PANTOPRAZOLE SODIUM 40 MILLIGRAM(S): 20 TABLET, DELAYED RELEASE ORAL at 06:58

## 2023-01-01 RX ADMIN — DIPHENHYDRAMINE HYDROCHLORIDE AND LIDOCAINE HYDROCHLORIDE AND ALUMINUM HYDROXIDE AND MAGNESIUM HYDRO 10 MILLILITER(S): KIT at 18:31

## 2023-01-01 RX ADMIN — Medication 1: at 05:52

## 2023-01-01 RX ADMIN — LEVETIRACETAM 250 MILLIGRAM(S): 250 TABLET, FILM COATED ORAL at 21:14

## 2023-01-01 RX ADMIN — Medication 40 MILLIGRAM(S): at 06:38

## 2023-01-01 RX ADMIN — Medication 0.2 MILLIGRAM(S): at 02:53

## 2023-01-01 RX ADMIN — SODIUM CHLORIDE 1 GRAM(S): 9 INJECTION INTRAMUSCULAR; INTRAVENOUS; SUBCUTANEOUS at 05:35

## 2023-01-01 RX ADMIN — Medication 300 MILLIGRAM(S): at 11:51

## 2023-01-01 RX ADMIN — Medication 3 MILLILITER(S): at 00:00

## 2023-01-01 RX ADMIN — Medication 3 MILLILITER(S): at 03:50

## 2023-01-01 RX ADMIN — Medication 1 APPLICATION(S): at 17:41

## 2023-01-01 RX ADMIN — PANTOPRAZOLE SODIUM 40 MILLIGRAM(S): 20 TABLET, DELAYED RELEASE ORAL at 06:31

## 2023-01-01 RX ADMIN — Medication 3 MILLILITER(S): at 08:05

## 2023-01-01 RX ADMIN — Medication 2: at 17:19

## 2023-01-01 RX ADMIN — APIXABAN 5 MILLIGRAM(S): 2.5 TABLET, FILM COATED ORAL at 17:16

## 2023-01-01 RX ADMIN — SODIUM CHLORIDE 4 MILLILITER(S): 9 INJECTION INTRAMUSCULAR; INTRAVENOUS; SUBCUTANEOUS at 04:00

## 2023-01-01 RX ADMIN — Medication 2: at 23:46

## 2023-01-01 RX ADMIN — Medication 1 DROP(S): at 09:33

## 2023-01-01 RX ADMIN — SODIUM CHLORIDE 4 MILLILITER(S): 9 INJECTION INTRAMUSCULAR; INTRAVENOUS; SUBCUTANEOUS at 09:28

## 2023-01-01 RX ADMIN — POLYMYXIN B SULFATE 250 UNIT(S): 500000 INJECTION, POWDER, LYOPHILIZED, FOR SOLUTION INTRAMUSCULAR; INTRATHECAL; INTRAVENOUS; OPHTHALMIC at 17:10

## 2023-01-01 RX ADMIN — HEPARIN SODIUM 8.5 UNIT(S)/HR: 5000 INJECTION INTRAVENOUS; SUBCUTANEOUS at 07:31

## 2023-01-01 RX ADMIN — Medication 1 DROP(S): at 08:53

## 2023-01-01 RX ADMIN — HEPARIN SODIUM 10.5 UNIT(S)/HR: 5000 INJECTION INTRAVENOUS; SUBCUTANEOUS at 19:28

## 2023-01-01 RX ADMIN — HUMAN INSULIN 6 UNIT(S): 100 INJECTION, SUSPENSION SUBCUTANEOUS at 11:17

## 2023-01-01 RX ADMIN — Medication 25 MILLIGRAM(S): at 21:35

## 2023-01-01 RX ADMIN — SODIUM CHLORIDE 1 GRAM(S): 9 INJECTION INTRAMUSCULAR; INTRAVENOUS; SUBCUTANEOUS at 05:11

## 2023-01-01 RX ADMIN — PANTOPRAZOLE SODIUM 40 MILLIGRAM(S): 20 TABLET, DELAYED RELEASE ORAL at 17:21

## 2023-01-01 RX ADMIN — Medication 6: at 06:25

## 2023-01-01 RX ADMIN — PIPERACILLIN AND TAZOBACTAM 25 GRAM(S): 4; .5 INJECTION, POWDER, LYOPHILIZED, FOR SOLUTION INTRAVENOUS at 14:41

## 2023-01-01 RX ADMIN — Medication 3 MILLILITER(S): at 16:18

## 2023-01-01 RX ADMIN — DROXIDOPA 600 MILLIGRAM(S): 100 CAPSULE ORAL at 21:59

## 2023-01-01 RX ADMIN — DEXMEDETOMIDINE HYDROCHLORIDE IN 0.9% SODIUM CHLORIDE 1.66 MICROGRAM(S)/KG/HR: 4 INJECTION INTRAVENOUS at 14:21

## 2023-01-01 RX ADMIN — Medication 1 DROP(S): at 22:27

## 2023-01-01 RX ADMIN — FENTANYL CITRATE 100 MICROGRAM(S): 50 INJECTION INTRAVENOUS at 17:03

## 2023-01-01 RX ADMIN — Medication 1 DROP(S): at 23:18

## 2023-01-01 RX ADMIN — Medication 3 MILLILITER(S): at 09:41

## 2023-01-01 RX ADMIN — HUMAN INSULIN 6 UNIT(S): 100 INJECTION, SUSPENSION SUBCUTANEOUS at 18:36

## 2023-01-01 RX ADMIN — Medication 3 MILLILITER(S): at 15:28

## 2023-01-01 RX ADMIN — DIPHENHYDRAMINE HYDROCHLORIDE AND LIDOCAINE HYDROCHLORIDE AND ALUMINUM HYDROXIDE AND MAGNESIUM HYDRO 10 MILLILITER(S): KIT at 05:24

## 2023-01-01 RX ADMIN — Medication 1 PACKET(S): at 11:49

## 2023-01-01 RX ADMIN — Medication 3 MILLILITER(S): at 10:19

## 2023-01-01 RX ADMIN — Medication 3 MILLILITER(S): at 10:42

## 2023-01-01 RX ADMIN — PANTOPRAZOLE SODIUM 40 MILLIGRAM(S): 20 TABLET, DELAYED RELEASE ORAL at 17:22

## 2023-01-01 RX ADMIN — CHLORHEXIDINE GLUCONATE 15 MILLILITER(S): 213 SOLUTION TOPICAL at 17:51

## 2023-01-01 RX ADMIN — DROXIDOPA 200 MILLIGRAM(S): 100 CAPSULE ORAL at 17:42

## 2023-01-01 RX ADMIN — MUPIROCIN 1 APPLICATION(S): 20 OINTMENT TOPICAL at 17:17

## 2023-01-01 RX ADMIN — Medication 1 DROP(S): at 09:51

## 2023-01-01 RX ADMIN — CALAMINE AND ZINC OXIDE AND PHENOL 1 APPLICATION(S): 160; 10 LOTION TOPICAL at 12:50

## 2023-01-01 RX ADMIN — Medication 3 MILLILITER(S): at 15:26

## 2023-01-01 RX ADMIN — POTASSIUM PHOSPHATE, MONOBASIC POTASSIUM PHOSPHATE, DIBASIC 83.33 MILLIMOLE(S): 236; 224 INJECTION, SOLUTION INTRAVENOUS at 05:27

## 2023-01-01 RX ADMIN — CHLORHEXIDINE GLUCONATE 1 APPLICATION(S): 213 SOLUTION TOPICAL at 05:22

## 2023-01-01 RX ADMIN — DROXIDOPA 600 MILLIGRAM(S): 100 CAPSULE ORAL at 01:03

## 2023-01-01 RX ADMIN — SODIUM CHLORIDE 2 GRAM(S): 9 INJECTION INTRAMUSCULAR; INTRAVENOUS; SUBCUTANEOUS at 17:46

## 2023-01-01 RX ADMIN — Medication 1 DROP(S): at 23:26

## 2023-01-01 RX ADMIN — ERYTHROPOIETIN 10000 UNIT(S): 10000 INJECTION, SOLUTION INTRAVENOUS; SUBCUTANEOUS at 12:33

## 2023-01-01 RX ADMIN — Medication 40 MILLIGRAM(S): at 05:31

## 2023-01-01 RX ADMIN — Medication 1 DROP(S): at 15:19

## 2023-01-01 RX ADMIN — Medication 1 APPLICATION(S): at 17:27

## 2023-01-01 RX ADMIN — Medication 3 MILLILITER(S): at 21:07

## 2023-01-01 RX ADMIN — Medication 1 APPLICATION(S): at 23:14

## 2023-01-01 RX ADMIN — MEROPENEM 100 MILLIGRAM(S): 1 INJECTION INTRAVENOUS at 18:27

## 2023-01-01 RX ADMIN — ALBUTEROL 2.5 MILLIGRAM(S): 90 AEROSOL, METERED ORAL at 09:49

## 2023-01-01 RX ADMIN — PANTOPRAZOLE SODIUM 40 MILLIGRAM(S): 20 TABLET, DELAYED RELEASE ORAL at 18:40

## 2023-01-01 RX ADMIN — HEPARIN SODIUM 5000 UNIT(S): 5000 INJECTION INTRAVENOUS; SUBCUTANEOUS at 21:06

## 2023-01-01 RX ADMIN — HUMAN INSULIN 3 UNIT(S): 100 INJECTION, SUSPENSION SUBCUTANEOUS at 11:35

## 2023-01-01 RX ADMIN — CHLORHEXIDINE GLUCONATE 15 MILLILITER(S): 213 SOLUTION TOPICAL at 05:07

## 2023-01-01 RX ADMIN — SODIUM CHLORIDE 4 MILLILITER(S): 9 INJECTION INTRAMUSCULAR; INTRAVENOUS; SUBCUTANEOUS at 04:09

## 2023-01-01 RX ADMIN — Medication 400 MILLIGRAM(S): at 21:35

## 2023-01-01 RX ADMIN — SODIUM CHLORIDE 1 GRAM(S): 9 INJECTION INTRAMUSCULAR; INTRAVENOUS; SUBCUTANEOUS at 07:00

## 2023-01-01 RX ADMIN — SODIUM CHLORIDE 4 MILLILITER(S): 9 INJECTION INTRAMUSCULAR; INTRAVENOUS; SUBCUTANEOUS at 08:40

## 2023-01-01 RX ADMIN — Medication 100 MILLIEQUIVALENT(S): at 04:37

## 2023-01-01 RX ADMIN — Medication 3 MILLILITER(S): at 11:51

## 2023-01-01 RX ADMIN — BUMETANIDE 2 MILLIGRAM(S): 0.25 INJECTION INTRAMUSCULAR; INTRAVENOUS at 05:43

## 2023-01-01 RX ADMIN — SODIUM CHLORIDE 4 MILLILITER(S): 9 INJECTION INTRAMUSCULAR; INTRAVENOUS; SUBCUTANEOUS at 15:26

## 2023-01-01 RX ADMIN — Medication 8: at 06:14

## 2023-01-01 RX ADMIN — APIXABAN 5 MILLIGRAM(S): 2.5 TABLET, FILM COATED ORAL at 18:40

## 2023-01-01 RX ADMIN — ATORVASTATIN CALCIUM 10 MILLIGRAM(S): 80 TABLET, FILM COATED ORAL at 22:27

## 2023-01-01 RX ADMIN — Medication 1 TABLET(S): at 21:06

## 2023-01-01 RX ADMIN — Medication 6 MILLIGRAM(S): at 20:58

## 2023-01-01 RX ADMIN — MIDODRINE HYDROCHLORIDE 40 MILLIGRAM(S): 2.5 TABLET ORAL at 00:59

## 2023-01-01 RX ADMIN — MIDAZOLAM HYDROCHLORIDE 4 MILLIGRAM(S): 1 INJECTION, SOLUTION INTRAMUSCULAR; INTRAVENOUS at 11:34

## 2023-01-01 RX ADMIN — ALBUTEROL 2.5 MILLIGRAM(S): 90 AEROSOL, METERED ORAL at 03:58

## 2023-01-01 RX ADMIN — PANTOPRAZOLE SODIUM 40 MILLIGRAM(S): 20 TABLET, DELAYED RELEASE ORAL at 05:36

## 2023-01-01 RX ADMIN — Medication 1 APPLICATION(S): at 05:15

## 2023-01-01 RX ADMIN — MIDODRINE HYDROCHLORIDE 10 MILLIGRAM(S): 2.5 TABLET ORAL at 21:04

## 2023-01-01 RX ADMIN — HUMAN INSULIN 3 UNIT(S): 100 INJECTION, SUSPENSION SUBCUTANEOUS at 23:51

## 2023-01-01 RX ADMIN — Medication 3 MILLILITER(S): at 04:06

## 2023-01-01 RX ADMIN — APIXABAN 5 MILLIGRAM(S): 2.5 TABLET, FILM COATED ORAL at 18:07

## 2023-01-01 RX ADMIN — Medication 1 DROP(S): at 17:42

## 2023-01-01 RX ADMIN — Medication 1 DROP(S): at 05:20

## 2023-01-01 RX ADMIN — ALBUTEROL 2.5 MILLIGRAM(S): 90 AEROSOL, METERED ORAL at 03:55

## 2023-01-01 RX ADMIN — PANTOPRAZOLE SODIUM 40 MILLIGRAM(S): 20 TABLET, DELAYED RELEASE ORAL at 17:12

## 2023-01-01 RX ADMIN — Medication 600 MILLIGRAM(S): at 14:54

## 2023-01-01 RX ADMIN — HUMAN INSULIN 6 UNIT(S): 100 INJECTION, SUSPENSION SUBCUTANEOUS at 06:16

## 2023-01-01 RX ADMIN — DROXIDOPA 600 MILLIGRAM(S): 100 CAPSULE ORAL at 02:12

## 2023-01-01 RX ADMIN — Medication 3 MILLILITER(S): at 21:46

## 2023-01-01 RX ADMIN — ALBUTEROL 2.5 MILLIGRAM(S): 90 AEROSOL, METERED ORAL at 09:42

## 2023-01-01 RX ADMIN — HUMAN INSULIN 3 UNIT(S): 100 INJECTION, SUSPENSION SUBCUTANEOUS at 17:30

## 2023-01-01 RX ADMIN — Medication 1: at 07:04

## 2023-01-01 RX ADMIN — Medication 1 DROP(S): at 22:45

## 2023-01-01 RX ADMIN — Medication 6 UNIT(S): at 17:28

## 2023-01-01 RX ADMIN — Medication 3 MILLILITER(S): at 00:13

## 2023-01-01 RX ADMIN — HUMAN INSULIN 6 UNIT(S): 100 INJECTION, SUSPENSION SUBCUTANEOUS at 11:14

## 2023-01-01 RX ADMIN — CALAMINE AND ZINC OXIDE AND PHENOL 1 APPLICATION(S): 160; 10 LOTION TOPICAL at 14:05

## 2023-01-01 RX ADMIN — VALACYCLOVIR 500 MILLIGRAM(S): 500 TABLET, FILM COATED ORAL at 13:28

## 2023-01-01 RX ADMIN — Medication 25 MILLILITER(S): at 01:02

## 2023-01-01 RX ADMIN — Medication 20 MILLIGRAM(S): at 19:24

## 2023-01-01 RX ADMIN — Medication 1 DROP(S): at 17:07

## 2023-01-01 RX ADMIN — MIDODRINE HYDROCHLORIDE 10 MILLIGRAM(S): 2.5 TABLET ORAL at 05:26

## 2023-01-01 RX ADMIN — SODIUM CHLORIDE 1 GRAM(S): 9 INJECTION INTRAMUSCULAR; INTRAVENOUS; SUBCUTANEOUS at 05:50

## 2023-01-01 RX ADMIN — Medication 1 DROP(S): at 23:07

## 2023-01-01 RX ADMIN — Medication 1 DROP(S): at 01:53

## 2023-01-01 RX ADMIN — ALBUTEROL 2.5 MILLIGRAM(S): 90 AEROSOL, METERED ORAL at 21:47

## 2023-01-01 RX ADMIN — DONEPEZIL HYDROCHLORIDE 10 MILLIGRAM(S): 10 TABLET, FILM COATED ORAL at 21:46

## 2023-01-01 RX ADMIN — MIDODRINE HYDROCHLORIDE 40 MILLIGRAM(S): 2.5 TABLET ORAL at 05:44

## 2023-01-01 RX ADMIN — MIDODRINE HYDROCHLORIDE 40 MILLIGRAM(S): 2.5 TABLET ORAL at 00:02

## 2023-01-01 RX ADMIN — POLYMYXIN B SULFATE 250 UNIT(S): 500000 INJECTION, POWDER, LYOPHILIZED, FOR SOLUTION INTRAMUSCULAR; INTRATHECAL; INTRAVENOUS; OPHTHALMIC at 05:53

## 2023-01-01 RX ADMIN — CARVEDILOL PHOSPHATE 37.5 MILLIGRAM(S): 80 CAPSULE, EXTENDED RELEASE ORAL at 17:22

## 2023-01-01 RX ADMIN — CHLORHEXIDINE GLUCONATE 15 MILLILITER(S): 213 SOLUTION TOPICAL at 17:37

## 2023-01-01 RX ADMIN — Medication 1 DROP(S): at 08:23

## 2023-01-01 RX ADMIN — CHLORHEXIDINE GLUCONATE 15 MILLILITER(S): 213 SOLUTION TOPICAL at 18:17

## 2023-01-01 RX ADMIN — BUMETANIDE 10 MG/HR: 0.25 INJECTION INTRAMUSCULAR; INTRAVENOUS at 08:23

## 2023-01-01 RX ADMIN — Medication 600 MILLIGRAM(S): at 13:02

## 2023-01-01 RX ADMIN — Medication 1 DROP(S): at 02:35

## 2023-01-01 RX ADMIN — HEPARIN SODIUM 8.5 UNIT(S)/HR: 5000 INJECTION INTRAVENOUS; SUBCUTANEOUS at 07:11

## 2023-01-01 RX ADMIN — HUMAN INSULIN 3 UNIT(S): 100 INJECTION, SUSPENSION SUBCUTANEOUS at 11:30

## 2023-01-01 RX ADMIN — ATORVASTATIN CALCIUM 10 MILLIGRAM(S): 80 TABLET, FILM COATED ORAL at 22:55

## 2023-01-01 RX ADMIN — Medication 2: at 05:33

## 2023-01-01 RX ADMIN — PANTOPRAZOLE SODIUM 40 MILLIGRAM(S): 20 TABLET, DELAYED RELEASE ORAL at 17:01

## 2023-01-01 RX ADMIN — Medication 81 MILLIGRAM(S): at 12:23

## 2023-01-01 RX ADMIN — Medication 3 MILLILITER(S): at 16:07

## 2023-01-01 RX ADMIN — Medication 2: at 17:16

## 2023-01-01 RX ADMIN — Medication 600 MILLIGRAM(S): at 22:06

## 2023-01-01 RX ADMIN — ALBUTEROL 2.5 MILLIGRAM(S): 90 AEROSOL, METERED ORAL at 22:19

## 2023-01-01 RX ADMIN — Medication 3 MILLILITER(S): at 03:24

## 2023-01-01 RX ADMIN — ERYTHROPOIETIN 10000 UNIT(S): 10000 INJECTION, SOLUTION INTRAVENOUS; SUBCUTANEOUS at 08:21

## 2023-01-01 RX ADMIN — CHLORHEXIDINE GLUCONATE 15 MILLILITER(S): 213 SOLUTION TOPICAL at 18:27

## 2023-01-01 RX ADMIN — Medication 1 APPLICATION(S): at 17:17

## 2023-01-01 RX ADMIN — Medication 3 MILLILITER(S): at 07:09

## 2023-01-01 RX ADMIN — MUPIROCIN 1 APPLICATION(S): 20 OINTMENT TOPICAL at 17:09

## 2023-01-01 RX ADMIN — Medication 1 DROP(S): at 06:09

## 2023-01-01 RX ADMIN — Medication 1 DROP(S): at 10:00

## 2023-01-01 RX ADMIN — Medication 1 DROP(S): at 13:10

## 2023-01-01 RX ADMIN — Medication 1 APPLICATION(S): at 23:28

## 2023-01-01 RX ADMIN — Medication 2: at 17:43

## 2023-01-01 RX ADMIN — Medication 500 MILLIGRAM(S): at 13:14

## 2023-01-01 RX ADMIN — HUMAN INSULIN 6 UNIT(S): 100 INJECTION, SUSPENSION SUBCUTANEOUS at 17:41

## 2023-01-01 RX ADMIN — Medication 3 MILLILITER(S): at 20:17

## 2023-01-01 RX ADMIN — SEVELAMER CARBONATE 1600 MILLIGRAM(S): 2400 POWDER, FOR SUSPENSION ORAL at 22:03

## 2023-01-01 RX ADMIN — HEPARIN SODIUM 5000 UNIT(S): 5000 INJECTION INTRAVENOUS; SUBCUTANEOUS at 21:22

## 2023-01-01 RX ADMIN — CARVEDILOL PHOSPHATE 37.5 MILLIGRAM(S): 80 CAPSULE, EXTENDED RELEASE ORAL at 08:37

## 2023-01-01 RX ADMIN — Medication 81 MILLIGRAM(S): at 12:24

## 2023-01-01 RX ADMIN — Medication 3 MILLILITER(S): at 04:23

## 2023-01-01 RX ADMIN — HUMAN INSULIN 12 UNIT(S): 100 INJECTION, SUSPENSION SUBCUTANEOUS at 05:46

## 2023-01-01 RX ADMIN — HUMAN INSULIN 3 UNIT(S): 100 INJECTION, SUSPENSION SUBCUTANEOUS at 11:33

## 2023-01-01 RX ADMIN — Medication 4: at 05:55

## 2023-01-01 RX ADMIN — Medication 3 MILLILITER(S): at 16:19

## 2023-01-01 RX ADMIN — CARVEDILOL PHOSPHATE 37.5 MILLIGRAM(S): 80 CAPSULE, EXTENDED RELEASE ORAL at 17:26

## 2023-01-01 RX ADMIN — Medication 6.23 MICROGRAM(S)/KG/MIN: at 08:04

## 2023-01-01 RX ADMIN — Medication 2: at 23:15

## 2023-01-01 RX ADMIN — Medication 2: at 07:05

## 2023-01-01 RX ADMIN — SEVELAMER CARBONATE 1600 MILLIGRAM(S): 2400 POWDER, FOR SUSPENSION ORAL at 06:02

## 2023-01-01 RX ADMIN — PANTOPRAZOLE SODIUM 40 MILLIGRAM(S): 20 TABLET, DELAYED RELEASE ORAL at 17:47

## 2023-01-01 RX ADMIN — ALBUTEROL 2.5 MILLIGRAM(S): 90 AEROSOL, METERED ORAL at 03:12

## 2023-01-01 RX ADMIN — Medication 3 MILLILITER(S): at 21:56

## 2023-01-01 RX ADMIN — ALBUTEROL 2.5 MILLIGRAM(S): 90 AEROSOL, METERED ORAL at 03:21

## 2023-01-01 RX ADMIN — MIDODRINE HYDROCHLORIDE 30 MILLIGRAM(S): 2.5 TABLET ORAL at 05:12

## 2023-01-01 RX ADMIN — Medication 1: at 12:52

## 2023-01-01 RX ADMIN — DEXMEDETOMIDINE HYDROCHLORIDE IN 0.9% SODIUM CHLORIDE 1.66 MICROGRAM(S)/KG/HR: 4 INJECTION INTRAVENOUS at 08:34

## 2023-01-01 RX ADMIN — Medication 1 DROP(S): at 17:56

## 2023-01-01 RX ADMIN — SEVELAMER CARBONATE 1600 MILLIGRAM(S): 2400 POWDER, FOR SUSPENSION ORAL at 21:38

## 2023-01-01 RX ADMIN — SEVELAMER CARBONATE 1600 MILLIGRAM(S): 2400 POWDER, FOR SUSPENSION ORAL at 21:16

## 2023-01-01 RX ADMIN — MIDODRINE HYDROCHLORIDE 40 MILLIGRAM(S): 2.5 TABLET ORAL at 00:28

## 2023-01-01 RX ADMIN — Medication 4: at 01:18

## 2023-01-01 RX ADMIN — DROXIDOPA 600 MILLIGRAM(S): 100 CAPSULE ORAL at 17:30

## 2023-01-01 RX ADMIN — Medication 1 APPLICATION(S): at 06:02

## 2023-01-01 RX ADMIN — Medication 3 MILLILITER(S): at 16:16

## 2023-01-01 RX ADMIN — PANTOPRAZOLE SODIUM 40 MILLIGRAM(S): 20 TABLET, DELAYED RELEASE ORAL at 06:17

## 2023-01-01 RX ADMIN — Medication 1 DROP(S): at 17:54

## 2023-01-01 RX ADMIN — PANTOPRAZOLE SODIUM 40 MILLIGRAM(S): 20 TABLET, DELAYED RELEASE ORAL at 18:05

## 2023-01-01 RX ADMIN — Medication 3 MILLILITER(S): at 03:25

## 2023-01-01 RX ADMIN — HUMAN INSULIN 3 UNIT(S): 100 INJECTION, SUSPENSION SUBCUTANEOUS at 11:44

## 2023-01-01 RX ADMIN — HUMAN INSULIN 4 UNIT(S): 100 INJECTION, SUSPENSION SUBCUTANEOUS at 00:23

## 2023-01-01 RX ADMIN — Medication 3 MILLILITER(S): at 17:27

## 2023-01-01 RX ADMIN — CHLORHEXIDINE GLUCONATE 15 MILLILITER(S): 213 SOLUTION TOPICAL at 06:25

## 2023-01-01 RX ADMIN — HUMAN INSULIN 6 UNIT(S): 100 INJECTION, SUSPENSION SUBCUTANEOUS at 11:56

## 2023-01-01 RX ADMIN — Medication 1 APPLICATION(S): at 17:48

## 2023-01-01 RX ADMIN — HEPARIN SODIUM 1000 UNIT(S)/HR: 5000 INJECTION INTRAVENOUS; SUBCUTANEOUS at 01:30

## 2023-01-01 RX ADMIN — Medication 1 PACKET(S): at 18:04

## 2023-01-01 RX ADMIN — CHLORHEXIDINE GLUCONATE 15 MILLILITER(S): 213 SOLUTION TOPICAL at 05:03

## 2023-01-01 RX ADMIN — Medication 3 MILLILITER(S): at 11:15

## 2023-01-01 RX ADMIN — Medication 3 MILLILITER(S): at 06:23

## 2023-01-01 RX ADMIN — DEXMEDETOMIDINE HYDROCHLORIDE IN 0.9% SODIUM CHLORIDE 1.66 MICROGRAM(S)/KG/HR: 4 INJECTION INTRAVENOUS at 19:27

## 2023-01-01 RX ADMIN — HEPARIN SODIUM 10.5 UNIT(S)/HR: 5000 INJECTION INTRAVENOUS; SUBCUTANEOUS at 10:02

## 2023-01-01 RX ADMIN — Medication 25 MILLIGRAM(S): at 13:50

## 2023-01-01 RX ADMIN — Medication 2: at 17:33

## 2023-01-01 RX ADMIN — SODIUM CHLORIDE 4 MILLILITER(S): 9 INJECTION INTRAMUSCULAR; INTRAVENOUS; SUBCUTANEOUS at 23:33

## 2023-01-01 RX ADMIN — Medication 40 MILLIGRAM(S): at 05:30

## 2023-01-01 RX ADMIN — Medication 4: at 12:17

## 2023-01-01 RX ADMIN — Medication 250 MILLIGRAM(S): at 12:40

## 2023-01-01 RX ADMIN — SODIUM CHLORIDE 4 MILLILITER(S): 9 INJECTION INTRAMUSCULAR; INTRAVENOUS; SUBCUTANEOUS at 05:10

## 2023-01-01 RX ADMIN — Medication 1 SPRAY(S): at 05:25

## 2023-01-01 RX ADMIN — CHLORHEXIDINE GLUCONATE 1 APPLICATION(S): 213 SOLUTION TOPICAL at 06:28

## 2023-01-01 RX ADMIN — Medication 1 DROP(S): at 19:08

## 2023-01-01 RX ADMIN — SODIUM CHLORIDE 4 MILLILITER(S): 9 INJECTION INTRAMUSCULAR; INTRAVENOUS; SUBCUTANEOUS at 04:22

## 2023-01-01 RX ADMIN — Medication 1 APPLICATION(S): at 11:15

## 2023-01-01 RX ADMIN — ATORVASTATIN CALCIUM 40 MILLIGRAM(S): 80 TABLET, FILM COATED ORAL at 22:59

## 2023-01-01 RX ADMIN — HUMAN INSULIN 6 UNIT(S): 100 INJECTION, SUSPENSION SUBCUTANEOUS at 18:29

## 2023-01-01 RX ADMIN — HUMAN INSULIN 1 UNIT(S): 100 INJECTION, SUSPENSION SUBCUTANEOUS at 00:16

## 2023-01-01 RX ADMIN — MIDODRINE HYDROCHLORIDE 40 MILLIGRAM(S): 2.5 TABLET ORAL at 05:02

## 2023-01-01 RX ADMIN — APIXABAN 5 MILLIGRAM(S): 2.5 TABLET, FILM COATED ORAL at 17:23

## 2023-01-01 RX ADMIN — SODIUM CHLORIDE 4 MILLILITER(S): 9 INJECTION INTRAMUSCULAR; INTRAVENOUS; SUBCUTANEOUS at 03:16

## 2023-01-01 RX ADMIN — Medication 1 DROP(S): at 05:42

## 2023-01-01 RX ADMIN — MIDODRINE HYDROCHLORIDE 40 MILLIGRAM(S): 2.5 TABLET ORAL at 00:03

## 2023-01-01 RX ADMIN — DEXMEDETOMIDINE HYDROCHLORIDE IN 0.9% SODIUM CHLORIDE 16.6 MICROGRAM(S)/KG/HR: 4 INJECTION INTRAVENOUS at 08:23

## 2023-01-01 RX ADMIN — Medication 1 DROP(S): at 08:11

## 2023-01-01 RX ADMIN — DROXIDOPA 600 MILLIGRAM(S): 100 CAPSULE ORAL at 11:13

## 2023-01-01 RX ADMIN — CHLORHEXIDINE GLUCONATE 15 MILLILITER(S): 213 SOLUTION TOPICAL at 18:33

## 2023-01-01 RX ADMIN — BUMETANIDE 2 MILLIGRAM(S): 0.25 INJECTION INTRAMUSCULAR; INTRAVENOUS at 05:36

## 2023-01-01 RX ADMIN — HUMAN INSULIN 1 UNIT(S): 100 INJECTION, SUSPENSION SUBCUTANEOUS at 17:09

## 2023-01-01 RX ADMIN — Medication 1 APPLICATION(S): at 23:22

## 2023-01-01 RX ADMIN — Medication 1 APPLICATION(S): at 13:08

## 2023-01-01 RX ADMIN — Medication 81 MILLIGRAM(S): at 12:00

## 2023-01-01 RX ADMIN — POLYMYXIN B SULFATE 250 UNIT(S): 500000 INJECTION, POWDER, LYOPHILIZED, FOR SOLUTION INTRAMUSCULAR; INTRATHECAL; INTRAVENOUS; OPHTHALMIC at 08:02

## 2023-01-01 RX ADMIN — Medication 3 MILLILITER(S): at 10:04

## 2023-01-01 RX ADMIN — APIXABAN 5 MILLIGRAM(S): 2.5 TABLET, FILM COATED ORAL at 18:25

## 2023-01-01 RX ADMIN — Medication 1 DROP(S): at 15:01

## 2023-01-01 RX ADMIN — Medication 0.5 MILLIGRAM(S): at 05:14

## 2023-01-01 RX ADMIN — MIDODRINE HYDROCHLORIDE 30 MILLIGRAM(S): 2.5 TABLET ORAL at 10:21

## 2023-01-01 RX ADMIN — SEVELAMER CARBONATE 1600 MILLIGRAM(S): 2400 POWDER, FOR SUSPENSION ORAL at 12:19

## 2023-01-01 RX ADMIN — CHLORHEXIDINE GLUCONATE 15 MILLILITER(S): 213 SOLUTION TOPICAL at 05:13

## 2023-01-01 RX ADMIN — APIXABAN 5 MILLIGRAM(S): 2.5 TABLET, FILM COATED ORAL at 18:06

## 2023-01-01 RX ADMIN — Medication 3 MILLILITER(S): at 15:58

## 2023-01-01 RX ADMIN — CHLORHEXIDINE GLUCONATE 15 MILLILITER(S): 213 SOLUTION TOPICAL at 05:52

## 2023-01-01 RX ADMIN — ALBUTEROL 2.5 MILLIGRAM(S): 90 AEROSOL, METERED ORAL at 21:36

## 2023-01-01 RX ADMIN — DROXIDOPA 600 MILLIGRAM(S): 100 CAPSULE ORAL at 11:17

## 2023-01-01 RX ADMIN — MIDODRINE HYDROCHLORIDE 30 MILLIGRAM(S): 2.5 TABLET ORAL at 15:58

## 2023-01-01 RX ADMIN — Medication 1 DROP(S): at 22:00

## 2023-01-01 RX ADMIN — Medication 1 DROP(S): at 03:40

## 2023-01-01 RX ADMIN — ATORVASTATIN CALCIUM 10 MILLIGRAM(S): 80 TABLET, FILM COATED ORAL at 22:07

## 2023-01-01 RX ADMIN — Medication 20 MILLIGRAM(S): at 01:43

## 2023-01-01 RX ADMIN — CARVEDILOL PHOSPHATE 12.5 MILLIGRAM(S): 80 CAPSULE, EXTENDED RELEASE ORAL at 23:30

## 2023-01-01 RX ADMIN — Medication 4 DROP(S): at 06:37

## 2023-01-01 RX ADMIN — Medication 1 APPLICATION(S): at 18:35

## 2023-01-01 RX ADMIN — Medication 3 MILLILITER(S): at 03:36

## 2023-01-01 RX ADMIN — Medication 400 MILLIGRAM(S): at 13:26

## 2023-01-01 RX ADMIN — DAPTOMYCIN 120 MILLIGRAM(S): 500 INJECTION, POWDER, LYOPHILIZED, FOR SOLUTION INTRAVENOUS at 14:41

## 2023-01-01 RX ADMIN — LEVETIRACETAM 250 MILLIGRAM(S): 250 TABLET, FILM COATED ORAL at 21:02

## 2023-01-01 RX ADMIN — HYDROMORPHONE HYDROCHLORIDE 0.5 MILLIGRAM(S): 2 INJECTION INTRAMUSCULAR; INTRAVENOUS; SUBCUTANEOUS at 01:47

## 2023-01-01 RX ADMIN — Medication 81 MILLIGRAM(S): at 11:48

## 2023-01-01 RX ADMIN — Medication 2: at 12:41

## 2023-01-01 RX ADMIN — MIDODRINE HYDROCHLORIDE 40 MILLIGRAM(S): 2.5 TABLET ORAL at 11:42

## 2023-01-01 RX ADMIN — HEPARIN SODIUM 10 UNIT(S)/HR: 5000 INJECTION INTRAVENOUS; SUBCUTANEOUS at 20:23

## 2023-01-01 RX ADMIN — Medication 1 DROP(S): at 11:27

## 2023-01-01 RX ADMIN — ATORVASTATIN CALCIUM 40 MILLIGRAM(S): 80 TABLET, FILM COATED ORAL at 21:21

## 2023-01-01 RX ADMIN — MIDODRINE HYDROCHLORIDE 30 MILLIGRAM(S): 2.5 TABLET ORAL at 05:41

## 2023-01-01 RX ADMIN — Medication 1 DROP(S): at 09:06

## 2023-01-01 RX ADMIN — LEVETIRACETAM 1000 MILLIGRAM(S): 250 TABLET, FILM COATED ORAL at 11:41

## 2023-01-01 RX ADMIN — Medication 1 APPLICATION(S): at 23:06

## 2023-01-01 RX ADMIN — Medication 2: at 00:01

## 2023-01-01 RX ADMIN — CHLORHEXIDINE GLUCONATE 15 MILLILITER(S): 213 SOLUTION TOPICAL at 06:01

## 2023-01-01 RX ADMIN — PANTOPRAZOLE SODIUM 40 MILLIGRAM(S): 20 TABLET, DELAYED RELEASE ORAL at 06:35

## 2023-01-01 RX ADMIN — BUMETANIDE 5 MG/HR: 0.25 INJECTION INTRAMUSCULAR; INTRAVENOUS at 17:00

## 2023-01-01 RX ADMIN — CHLORHEXIDINE GLUCONATE 15 MILLILITER(S): 213 SOLUTION TOPICAL at 17:20

## 2023-01-01 RX ADMIN — SODIUM CHLORIDE 4 MILLILITER(S): 9 INJECTION INTRAMUSCULAR; INTRAVENOUS; SUBCUTANEOUS at 10:19

## 2023-01-01 RX ADMIN — Medication 100 MILLIGRAM(S): at 02:11

## 2023-01-01 RX ADMIN — ALBUTEROL 2.5 MILLIGRAM(S): 90 AEROSOL, METERED ORAL at 10:36

## 2023-01-01 RX ADMIN — APIXABAN 5 MILLIGRAM(S): 2.5 TABLET, FILM COATED ORAL at 05:46

## 2023-01-01 RX ADMIN — SODIUM CHLORIDE 4 MILLILITER(S): 9 INJECTION INTRAMUSCULAR; INTRAVENOUS; SUBCUTANEOUS at 15:27

## 2023-01-01 RX ADMIN — CHLORHEXIDINE GLUCONATE 15 MILLILITER(S): 213 SOLUTION TOPICAL at 05:51

## 2023-01-01 RX ADMIN — Medication 3 MILLILITER(S): at 09:55

## 2023-01-01 RX ADMIN — ALBUTEROL 2.5 MILLIGRAM(S): 90 AEROSOL, METERED ORAL at 04:57

## 2023-01-01 RX ADMIN — HEPARIN SODIUM 5000 UNIT(S): 5000 INJECTION INTRAVENOUS; SUBCUTANEOUS at 21:56

## 2023-01-01 RX ADMIN — HUMAN INSULIN 6 UNIT(S): 100 INJECTION, SUSPENSION SUBCUTANEOUS at 17:39

## 2023-01-01 RX ADMIN — ALBUTEROL 2.5 MILLIGRAM(S): 90 AEROSOL, METERED ORAL at 10:28

## 2023-01-01 RX ADMIN — SODIUM CHLORIDE 4 MILLILITER(S): 9 INJECTION INTRAMUSCULAR; INTRAVENOUS; SUBCUTANEOUS at 17:02

## 2023-01-01 RX ADMIN — Medication 50 MILLIGRAM(S): at 22:15

## 2023-01-01 RX ADMIN — HUMAN INSULIN 3 UNIT(S): 100 INJECTION, SUSPENSION SUBCUTANEOUS at 00:13

## 2023-01-01 RX ADMIN — HUMAN INSULIN 1 UNIT(S): 100 INJECTION, SUSPENSION SUBCUTANEOUS at 05:32

## 2023-01-01 RX ADMIN — Medication 2: at 18:24

## 2023-01-01 RX ADMIN — Medication 300 MILLIGRAM(S): at 17:47

## 2023-01-01 RX ADMIN — Medication 6 UNIT(S): at 09:07

## 2023-01-01 RX ADMIN — APIXABAN 5 MILLIGRAM(S): 2.5 TABLET, FILM COATED ORAL at 17:08

## 2023-01-01 RX ADMIN — LEVETIRACETAM 250 MILLIGRAM(S): 250 TABLET, FILM COATED ORAL at 21:44

## 2023-01-01 RX ADMIN — SODIUM CHLORIDE 1 GRAM(S): 9 INJECTION INTRAMUSCULAR; INTRAVENOUS; SUBCUTANEOUS at 17:59

## 2023-01-01 RX ADMIN — SODIUM CHLORIDE 4 MILLILITER(S): 9 INJECTION INTRAMUSCULAR; INTRAVENOUS; SUBCUTANEOUS at 10:36

## 2023-01-01 RX ADMIN — ATORVASTATIN CALCIUM 10 MILLIGRAM(S): 80 TABLET, FILM COATED ORAL at 21:38

## 2023-01-01 RX ADMIN — BUMETANIDE 2 MILLIGRAM(S): 0.25 INJECTION INTRAMUSCULAR; INTRAVENOUS at 05:44

## 2023-01-01 RX ADMIN — Medication 3 MILLILITER(S): at 21:29

## 2023-01-01 RX ADMIN — Medication 100 MILLIEQUIVALENT(S): at 01:12

## 2023-01-01 RX ADMIN — DEXMEDETOMIDINE HYDROCHLORIDE IN 0.9% SODIUM CHLORIDE 1.66 MICROGRAM(S)/KG/HR: 4 INJECTION INTRAVENOUS at 18:36

## 2023-01-01 RX ADMIN — Medication 3 MILLILITER(S): at 21:04

## 2023-01-01 RX ADMIN — SODIUM CHLORIDE 4 MILLILITER(S): 9 INJECTION INTRAMUSCULAR; INTRAVENOUS; SUBCUTANEOUS at 04:02

## 2023-01-01 RX ADMIN — Medication 1 DROP(S): at 09:13

## 2023-01-01 RX ADMIN — SEVELAMER CARBONATE 800 MILLIGRAM(S): 2400 POWDER, FOR SUSPENSION ORAL at 21:14

## 2023-01-01 RX ADMIN — MIDODRINE HYDROCHLORIDE 40 MILLIGRAM(S): 2.5 TABLET ORAL at 23:56

## 2023-01-01 RX ADMIN — Medication 1 PACKET(S): at 11:37

## 2023-01-01 RX ADMIN — MIDODRINE HYDROCHLORIDE 30 MILLIGRAM(S): 2.5 TABLET ORAL at 05:01

## 2023-01-01 RX ADMIN — LEVETIRACETAM 1000 MILLIGRAM(S): 250 TABLET, FILM COATED ORAL at 13:09

## 2023-01-01 RX ADMIN — ATORVASTATIN CALCIUM 40 MILLIGRAM(S): 80 TABLET, FILM COATED ORAL at 21:10

## 2023-01-01 RX ADMIN — Medication 3 MILLILITER(S): at 09:27

## 2023-01-01 RX ADMIN — CHLORHEXIDINE GLUCONATE 1 APPLICATION(S): 213 SOLUTION TOPICAL at 06:59

## 2023-01-01 RX ADMIN — Medication 3 MILLILITER(S): at 21:22

## 2023-01-01 RX ADMIN — LEVETIRACETAM 1000 MILLIGRAM(S): 250 TABLET, FILM COATED ORAL at 12:39

## 2023-01-01 RX ADMIN — SEVELAMER CARBONATE 1600 MILLIGRAM(S): 2400 POWDER, FOR SUSPENSION ORAL at 14:39

## 2023-01-01 RX ADMIN — Medication 650 MILLIGRAM(S): at 22:18

## 2023-01-01 RX ADMIN — BUMETANIDE 2 MILLIGRAM(S): 0.25 INJECTION INTRAMUSCULAR; INTRAVENOUS at 06:28

## 2023-01-01 RX ADMIN — Medication 3 MILLILITER(S): at 16:20

## 2023-01-01 RX ADMIN — SODIUM CHLORIDE 2 GRAM(S): 9 INJECTION INTRAMUSCULAR; INTRAVENOUS; SUBCUTANEOUS at 06:09

## 2023-01-01 RX ADMIN — MIDODRINE HYDROCHLORIDE 40 MILLIGRAM(S): 2.5 TABLET ORAL at 18:49

## 2023-01-01 RX ADMIN — Medication 1 DROP(S): at 17:18

## 2023-01-01 RX ADMIN — Medication 200 GRAM(S): at 09:45

## 2023-01-01 RX ADMIN — Medication 3 MILLILITER(S): at 03:57

## 2023-01-01 RX ADMIN — HEPARIN SODIUM 5000 UNIT(S): 5000 INJECTION INTRAVENOUS; SUBCUTANEOUS at 05:50

## 2023-01-01 RX ADMIN — INSULIN GLARGINE 30 UNIT(S): 100 INJECTION, SOLUTION SUBCUTANEOUS at 21:56

## 2023-01-01 RX ADMIN — SODIUM CHLORIDE 4 MILLILITER(S): 9 INJECTION INTRAMUSCULAR; INTRAVENOUS; SUBCUTANEOUS at 03:47

## 2023-01-01 RX ADMIN — Medication 3 MILLILITER(S): at 03:45

## 2023-01-01 RX ADMIN — SODIUM CHLORIDE 2 GRAM(S): 9 INJECTION INTRAMUSCULAR; INTRAVENOUS; SUBCUTANEOUS at 05:10

## 2023-01-01 RX ADMIN — HEPARIN SODIUM 10.5 UNIT(S)/HR: 5000 INJECTION INTRAVENOUS; SUBCUTANEOUS at 20:23

## 2023-01-01 RX ADMIN — Medication 4.99 MICROGRAM(S)/KG/MIN: at 22:54

## 2023-01-01 RX ADMIN — Medication 1 DROP(S): at 22:21

## 2023-01-01 RX ADMIN — Medication 3 MILLILITER(S): at 04:12

## 2023-01-01 RX ADMIN — ATORVASTATIN CALCIUM 10 MILLIGRAM(S): 80 TABLET, FILM COATED ORAL at 21:56

## 2023-01-01 RX ADMIN — PANTOPRAZOLE SODIUM 40 MILLIGRAM(S): 20 TABLET, DELAYED RELEASE ORAL at 18:26

## 2023-01-01 RX ADMIN — DROXIDOPA 100 MILLIGRAM(S): 100 CAPSULE ORAL at 05:01

## 2023-01-01 RX ADMIN — HUMAN INSULIN 6 UNIT(S): 100 INJECTION, SUSPENSION SUBCUTANEOUS at 06:32

## 2023-01-01 RX ADMIN — HUMAN INSULIN 3 UNIT(S): 100 INJECTION, SUSPENSION SUBCUTANEOUS at 18:22

## 2023-01-01 RX ADMIN — LEVETIRACETAM 1000 MILLIGRAM(S): 250 TABLET, FILM COATED ORAL at 12:54

## 2023-01-01 RX ADMIN — Medication 1 DROP(S): at 18:09

## 2023-01-01 RX ADMIN — SODIUM CHLORIDE 1 GRAM(S): 9 INJECTION INTRAMUSCULAR; INTRAVENOUS; SUBCUTANEOUS at 17:00

## 2023-01-01 RX ADMIN — BUMETANIDE 2 MILLIGRAM(S): 0.25 INJECTION INTRAMUSCULAR; INTRAVENOUS at 06:10

## 2023-01-01 RX ADMIN — HUMAN INSULIN 3 UNIT(S): 100 INJECTION, SUSPENSION SUBCUTANEOUS at 11:53

## 2023-01-01 RX ADMIN — Medication 2: at 06:21

## 2023-01-01 RX ADMIN — Medication 40 MILLIGRAM(S): at 05:05

## 2023-01-01 RX ADMIN — Medication 1: at 16:42

## 2023-01-01 RX ADMIN — Medication 6: at 11:31

## 2023-01-01 RX ADMIN — SODIUM CHLORIDE 1 GRAM(S): 9 INJECTION INTRAMUSCULAR; INTRAVENOUS; SUBCUTANEOUS at 05:38

## 2023-01-01 RX ADMIN — Medication 6 MILLIGRAM(S): at 08:57

## 2023-01-01 RX ADMIN — MUPIROCIN 1 APPLICATION(S): 20 OINTMENT TOPICAL at 06:08

## 2023-01-01 RX ADMIN — HUMAN INSULIN 6 UNIT(S): 100 INJECTION, SUSPENSION SUBCUTANEOUS at 06:59

## 2023-01-01 RX ADMIN — HUMAN INSULIN 11 UNIT(S): 100 INJECTION, SUSPENSION SUBCUTANEOUS at 00:06

## 2023-01-01 RX ADMIN — Medication 2: at 17:11

## 2023-01-01 RX ADMIN — HUMAN INSULIN 6 UNIT(S): 100 INJECTION, SUSPENSION SUBCUTANEOUS at 13:45

## 2023-01-01 RX ADMIN — Medication 4 DROP(S): at 05:41

## 2023-01-01 RX ADMIN — CHLORHEXIDINE GLUCONATE 15 MILLILITER(S): 213 SOLUTION TOPICAL at 05:23

## 2023-01-01 RX ADMIN — SODIUM CHLORIDE 2 GRAM(S): 9 INJECTION INTRAMUSCULAR; INTRAVENOUS; SUBCUTANEOUS at 05:44

## 2023-01-01 RX ADMIN — ATORVASTATIN CALCIUM 40 MILLIGRAM(S): 80 TABLET, FILM COATED ORAL at 22:23

## 2023-01-01 RX ADMIN — HUMAN INSULIN 6 UNIT(S): 100 INJECTION, SUSPENSION SUBCUTANEOUS at 12:33

## 2023-01-01 RX ADMIN — HUMAN INSULIN 3 UNIT(S): 100 INJECTION, SUSPENSION SUBCUTANEOUS at 06:22

## 2023-01-01 RX ADMIN — Medication 4: at 13:59

## 2023-01-01 RX ADMIN — Medication 100 MILLIGRAM(S): at 08:02

## 2023-01-01 RX ADMIN — HUMAN INSULIN 3 UNIT(S): 100 INJECTION, SUSPENSION SUBCUTANEOUS at 23:30

## 2023-01-01 RX ADMIN — HUMAN INSULIN 16 UNIT(S): 100 INJECTION, SUSPENSION SUBCUTANEOUS at 23:09

## 2023-01-01 RX ADMIN — Medication 6 MILLIGRAM(S): at 19:32

## 2023-01-01 RX ADMIN — SEVELAMER CARBONATE 800 MILLIGRAM(S): 2400 POWDER, FOR SUSPENSION ORAL at 21:50

## 2023-01-01 RX ADMIN — SODIUM CHLORIDE 1 GRAM(S): 9 INJECTION INTRAMUSCULAR; INTRAVENOUS; SUBCUTANEOUS at 20:30

## 2023-01-01 RX ADMIN — Medication 40 MILLIGRAM(S): at 18:15

## 2023-01-01 RX ADMIN — SODIUM CHLORIDE 1 GRAM(S): 9 INJECTION INTRAMUSCULAR; INTRAVENOUS; SUBCUTANEOUS at 17:08

## 2023-01-01 RX ADMIN — DIPHENHYDRAMINE HYDROCHLORIDE AND LIDOCAINE HYDROCHLORIDE AND ALUMINUM HYDROXIDE AND MAGNESIUM HYDRO 10 MILLILITER(S): KIT at 05:40

## 2023-01-01 RX ADMIN — Medication 3 MILLILITER(S): at 07:59

## 2023-01-01 RX ADMIN — Medication 1 DROP(S): at 07:04

## 2023-01-01 RX ADMIN — Medication 1 DROP(S): at 18:00

## 2023-01-01 RX ADMIN — MIDODRINE HYDROCHLORIDE 10 MILLIGRAM(S): 2.5 TABLET ORAL at 21:07

## 2023-01-01 RX ADMIN — Medication 300 MILLIGRAM(S): at 11:45

## 2023-01-01 RX ADMIN — HUMAN INSULIN 1 UNIT(S): 100 INJECTION, SUSPENSION SUBCUTANEOUS at 11:51

## 2023-01-01 RX ADMIN — DONEPEZIL HYDROCHLORIDE 10 MILLIGRAM(S): 10 TABLET, FILM COATED ORAL at 21:57

## 2023-01-01 RX ADMIN — DEXMEDETOMIDINE HYDROCHLORIDE IN 0.9% SODIUM CHLORIDE 16.6 MICROGRAM(S)/KG/HR: 4 INJECTION INTRAVENOUS at 08:19

## 2023-01-01 RX ADMIN — CHLORHEXIDINE GLUCONATE 15 MILLILITER(S): 213 SOLUTION TOPICAL at 06:31

## 2023-01-01 RX ADMIN — HUMAN INSULIN 6 UNIT(S): 100 INJECTION, SUSPENSION SUBCUTANEOUS at 13:22

## 2023-01-01 RX ADMIN — DIPHENHYDRAMINE HYDROCHLORIDE AND LIDOCAINE HYDROCHLORIDE AND ALUMINUM HYDROXIDE AND MAGNESIUM HYDRO 10 MILLILITER(S): KIT at 18:23

## 2023-01-01 RX ADMIN — Medication 1 DROP(S): at 09:56

## 2023-01-01 RX ADMIN — POLYMYXIN B SULFATE 250 UNIT(S): 500000 INJECTION, POWDER, LYOPHILIZED, FOR SOLUTION INTRAMUSCULAR; INTRATHECAL; INTRAVENOUS; OPHTHALMIC at 17:42

## 2023-01-01 RX ADMIN — DIPHENHYDRAMINE HYDROCHLORIDE AND LIDOCAINE HYDROCHLORIDE AND ALUMINUM HYDROXIDE AND MAGNESIUM HYDRO 10 MILLILITER(S): KIT at 00:06

## 2023-01-01 RX ADMIN — Medication 10 MILLIGRAM(S): at 11:20

## 2023-01-01 RX ADMIN — HUMAN INSULIN 12 UNIT(S): 100 INJECTION, SUSPENSION SUBCUTANEOUS at 00:22

## 2023-01-01 RX ADMIN — CHLORHEXIDINE GLUCONATE 15 MILLILITER(S): 213 SOLUTION TOPICAL at 18:07

## 2023-01-01 RX ADMIN — Medication 3 MILLILITER(S): at 16:11

## 2023-01-01 RX ADMIN — Medication 1 DROP(S): at 19:04

## 2023-01-01 RX ADMIN — ALBUTEROL 2.5 MILLIGRAM(S): 90 AEROSOL, METERED ORAL at 15:55

## 2023-01-01 RX ADMIN — Medication 3: at 13:06

## 2023-01-01 RX ADMIN — HUMAN INSULIN 6 UNIT(S): 100 INJECTION, SUSPENSION SUBCUTANEOUS at 05:56

## 2023-01-01 RX ADMIN — Medication 0.2 MILLIGRAM(S): at 18:27

## 2023-01-01 RX ADMIN — Medication 1 APPLICATION(S): at 23:49

## 2023-01-01 RX ADMIN — PANTOPRAZOLE SODIUM 40 MILLIGRAM(S): 20 TABLET, DELAYED RELEASE ORAL at 17:19

## 2023-01-01 RX ADMIN — Medication 1 DROP(S): at 02:56

## 2023-01-01 RX ADMIN — Medication 3 MILLILITER(S): at 22:43

## 2023-01-01 RX ADMIN — Medication 1 DROP(S): at 05:08

## 2023-01-01 RX ADMIN — HEPARIN SODIUM 5000 UNIT(S): 5000 INJECTION INTRAVENOUS; SUBCUTANEOUS at 13:15

## 2023-01-01 RX ADMIN — SODIUM CHLORIDE 3 MILLILITER(S): 9 INJECTION INTRAMUSCULAR; INTRAVENOUS; SUBCUTANEOUS at 11:50

## 2023-01-01 RX ADMIN — Medication 1 DROP(S): at 22:54

## 2023-01-01 RX ADMIN — CHLORHEXIDINE GLUCONATE 15 MILLILITER(S): 213 SOLUTION TOPICAL at 18:04

## 2023-01-01 RX ADMIN — Medication 3: at 17:24

## 2023-01-01 RX ADMIN — HEPARIN SODIUM 9 UNIT(S)/HR: 5000 INJECTION INTRAVENOUS; SUBCUTANEOUS at 09:04

## 2023-01-01 RX ADMIN — Medication 50 MILLIGRAM(S): at 11:40

## 2023-01-01 RX ADMIN — HEPARIN SODIUM 5000 UNIT(S): 5000 INJECTION INTRAVENOUS; SUBCUTANEOUS at 05:54

## 2023-01-01 RX ADMIN — SODIUM CHLORIDE 4 MILLILITER(S): 9 INJECTION INTRAMUSCULAR; INTRAVENOUS; SUBCUTANEOUS at 15:14

## 2023-01-01 RX ADMIN — HUMAN INSULIN 7 UNIT(S): 100 INJECTION, SUSPENSION SUBCUTANEOUS at 05:12

## 2023-01-01 RX ADMIN — Medication 10: at 18:33

## 2023-01-01 RX ADMIN — Medication 81 MILLIGRAM(S): at 11:50

## 2023-01-01 RX ADMIN — Medication 3 MILLILITER(S): at 08:37

## 2023-01-01 RX ADMIN — Medication 3 MILLILITER(S): at 15:02

## 2023-01-01 RX ADMIN — Medication 3 MILLILITER(S): at 16:38

## 2023-01-01 RX ADMIN — MIDODRINE HYDROCHLORIDE 40 MILLIGRAM(S): 2.5 TABLET ORAL at 17:54

## 2023-01-01 RX ADMIN — Medication 1 DROP(S): at 02:28

## 2023-01-01 RX ADMIN — Medication 1 DROP(S): at 22:14

## 2023-01-01 RX ADMIN — Medication 3 MILLILITER(S): at 03:55

## 2023-01-01 RX ADMIN — Medication 3 MILLILITER(S): at 03:34

## 2023-01-01 RX ADMIN — APIXABAN 5 MILLIGRAM(S): 2.5 TABLET, FILM COATED ORAL at 17:43

## 2023-01-01 RX ADMIN — Medication 1 APPLICATION(S): at 00:15

## 2023-01-01 RX ADMIN — ALBUTEROL 2.5 MILLIGRAM(S): 90 AEROSOL, METERED ORAL at 03:54

## 2023-01-01 RX ADMIN — Medication 3: at 23:07

## 2023-01-01 RX ADMIN — Medication 650 MILLIGRAM(S): at 05:11

## 2023-01-01 RX ADMIN — MIDODRINE HYDROCHLORIDE 40 MILLIGRAM(S): 2.5 TABLET ORAL at 18:35

## 2023-01-01 RX ADMIN — APIXABAN 5 MILLIGRAM(S): 2.5 TABLET, FILM COATED ORAL at 17:37

## 2023-01-01 RX ADMIN — Medication 0.5 MILLIGRAM(S): at 18:16

## 2023-01-01 RX ADMIN — DROXIDOPA 600 MILLIGRAM(S): 100 CAPSULE ORAL at 17:35

## 2023-01-01 RX ADMIN — CALAMINE AND ZINC OXIDE AND PHENOL 1 APPLICATION(S): 160; 10 LOTION TOPICAL at 11:49

## 2023-01-01 RX ADMIN — SODIUM CHLORIDE 2 GRAM(S): 9 INJECTION INTRAMUSCULAR; INTRAVENOUS; SUBCUTANEOUS at 17:27

## 2023-01-01 RX ADMIN — HUMAN INSULIN 3 UNIT(S): 100 INJECTION, SUSPENSION SUBCUTANEOUS at 17:44

## 2023-01-01 RX ADMIN — PROPOFOL 7.98 MICROGRAM(S)/KG/MIN: 10 INJECTION, EMULSION INTRAVENOUS at 11:13

## 2023-01-01 RX ADMIN — Medication 0.1 MILLIGRAM(S): at 17:28

## 2023-01-01 RX ADMIN — Medication 40 MILLIGRAM(S): at 05:22

## 2023-01-01 RX ADMIN — Medication 50 MILLIGRAM(S): at 06:11

## 2023-01-01 RX ADMIN — HUMAN INSULIN 6 UNIT(S): 100 INJECTION, SUSPENSION SUBCUTANEOUS at 17:16

## 2023-01-01 RX ADMIN — Medication 1 DROP(S): at 13:46

## 2023-01-01 RX ADMIN — Medication 6 UNIT(S): at 09:04

## 2023-01-01 RX ADMIN — APIXABAN 5 MILLIGRAM(S): 2.5 TABLET, FILM COATED ORAL at 17:47

## 2023-01-01 RX ADMIN — Medication 400 MILLIGRAM(S): at 02:03

## 2023-01-01 RX ADMIN — Medication 40 MILLIEQUIVALENT(S): at 17:34

## 2023-01-01 RX ADMIN — Medication 4: at 18:00

## 2023-01-01 RX ADMIN — HUMAN INSULIN 6 UNIT(S): 100 INJECTION, SUSPENSION SUBCUTANEOUS at 05:16

## 2023-01-01 RX ADMIN — SODIUM CHLORIDE 2 GRAM(S): 9 INJECTION INTRAMUSCULAR; INTRAVENOUS; SUBCUTANEOUS at 06:11

## 2023-01-01 RX ADMIN — HEPARIN SODIUM 5000 UNIT(S): 5000 INJECTION INTRAVENOUS; SUBCUTANEOUS at 14:16

## 2023-01-01 RX ADMIN — SODIUM CHLORIDE 1 GRAM(S): 9 INJECTION INTRAMUSCULAR; INTRAVENOUS; SUBCUTANEOUS at 17:30

## 2023-01-01 RX ADMIN — Medication 1 DROP(S): at 21:06

## 2023-01-01 RX ADMIN — SODIUM CHLORIDE 1 GRAM(S): 9 INJECTION INTRAMUSCULAR; INTRAVENOUS; SUBCUTANEOUS at 05:40

## 2023-01-01 RX ADMIN — ATORVASTATIN CALCIUM 10 MILLIGRAM(S): 80 TABLET, FILM COATED ORAL at 21:43

## 2023-01-01 RX ADMIN — Medication 1 APPLICATION(S): at 00:24

## 2023-01-01 RX ADMIN — SODIUM CHLORIDE 4 MILLILITER(S): 9 INJECTION INTRAMUSCULAR; INTRAVENOUS; SUBCUTANEOUS at 09:52

## 2023-01-01 RX ADMIN — DROXIDOPA 600 MILLIGRAM(S): 100 CAPSULE ORAL at 14:42

## 2023-01-01 RX ADMIN — Medication 1000 MILLIGRAM(S): at 18:33

## 2023-01-01 RX ADMIN — CHLORHEXIDINE GLUCONATE 1 APPLICATION(S): 213 SOLUTION TOPICAL at 12:56

## 2023-01-01 RX ADMIN — Medication 81 MILLIGRAM(S): at 12:19

## 2023-01-01 RX ADMIN — Medication 3 MILLILITER(S): at 07:28

## 2023-01-01 RX ADMIN — CHLORHEXIDINE GLUCONATE 15 MILLILITER(S): 213 SOLUTION TOPICAL at 05:41

## 2023-01-01 RX ADMIN — LEVETIRACETAM 1000 MILLIGRAM(S): 250 TABLET, FILM COATED ORAL at 11:31

## 2023-01-01 RX ADMIN — Medication 7 UNIT(S): at 08:18

## 2023-01-01 RX ADMIN — Medication 1 DROP(S): at 21:49

## 2023-01-01 RX ADMIN — MUPIROCIN 1 APPLICATION(S): 20 OINTMENT TOPICAL at 17:35

## 2023-01-01 RX ADMIN — SODIUM CHLORIDE 1 GRAM(S): 9 INJECTION INTRAMUSCULAR; INTRAVENOUS; SUBCUTANEOUS at 05:17

## 2023-01-01 RX ADMIN — CALAMINE AND ZINC OXIDE AND PHENOL 1 APPLICATION(S): 160; 10 LOTION TOPICAL at 11:12

## 2023-01-01 RX ADMIN — Medication 1 DROP(S): at 21:48

## 2023-01-01 RX ADMIN — Medication 1 DROP(S): at 18:07

## 2023-01-01 RX ADMIN — Medication 1 DROP(S): at 21:56

## 2023-01-01 RX ADMIN — HUMAN INSULIN 3 UNIT(S): 100 INJECTION, SUSPENSION SUBCUTANEOUS at 18:04

## 2023-01-01 RX ADMIN — HUMAN INSULIN 6 UNIT(S): 100 INJECTION, SUSPENSION SUBCUTANEOUS at 17:23

## 2023-01-01 RX ADMIN — Medication 6: at 00:26

## 2023-01-01 RX ADMIN — ERYTHROPOIETIN 10000 UNIT(S): 10000 INJECTION, SOLUTION INTRAVENOUS; SUBCUTANEOUS at 14:30

## 2023-01-01 RX ADMIN — Medication 40 MILLIGRAM(S): at 17:31

## 2023-01-01 RX ADMIN — Medication 6: at 05:34

## 2023-01-01 RX ADMIN — MIDODRINE HYDROCHLORIDE 40 MILLIGRAM(S): 2.5 TABLET ORAL at 23:37

## 2023-01-01 RX ADMIN — PANTOPRAZOLE SODIUM 40 MILLIGRAM(S): 20 TABLET, DELAYED RELEASE ORAL at 05:24

## 2023-01-01 RX ADMIN — LETROZOLE 2.5 MILLIGRAM(S): 2.5 TABLET, FILM COATED ORAL at 12:59

## 2023-01-01 RX ADMIN — HUMAN INSULIN 3 UNIT(S): 100 INJECTION, SUSPENSION SUBCUTANEOUS at 06:02

## 2023-01-01 RX ADMIN — HEPARIN SODIUM 5000 UNIT(S): 5000 INJECTION INTRAVENOUS; SUBCUTANEOUS at 21:15

## 2023-01-01 RX ADMIN — PANTOPRAZOLE SODIUM 40 MILLIGRAM(S): 20 TABLET, DELAYED RELEASE ORAL at 05:20

## 2023-01-01 RX ADMIN — Medication 1 DROP(S): at 02:00

## 2023-01-01 RX ADMIN — MIDODRINE HYDROCHLORIDE 30 MILLIGRAM(S): 2.5 TABLET ORAL at 13:35

## 2023-01-01 RX ADMIN — ALBUTEROL 2.5 MILLIGRAM(S): 90 AEROSOL, METERED ORAL at 03:20

## 2023-01-01 RX ADMIN — SODIUM CHLORIDE 4 MILLILITER(S): 9 INJECTION INTRAMUSCULAR; INTRAVENOUS; SUBCUTANEOUS at 16:16

## 2023-01-01 RX ADMIN — Medication 81 MILLIGRAM(S): at 13:54

## 2023-01-01 RX ADMIN — ALBUTEROL 2.5 MILLIGRAM(S): 90 AEROSOL, METERED ORAL at 05:06

## 2023-01-01 RX ADMIN — MIDODRINE HYDROCHLORIDE 30 MILLIGRAM(S): 2.5 TABLET ORAL at 13:54

## 2023-01-01 RX ADMIN — HUMAN INSULIN 3 UNIT(S): 100 INJECTION, SUSPENSION SUBCUTANEOUS at 11:13

## 2023-01-01 RX ADMIN — HUMAN INSULIN 3 UNIT(S): 100 INJECTION, SUSPENSION SUBCUTANEOUS at 23:47

## 2023-01-01 RX ADMIN — Medication 1 APPLICATION(S): at 18:01

## 2023-01-01 RX ADMIN — Medication 1 DROP(S): at 15:13

## 2023-01-01 RX ADMIN — Medication 1 DROP(S): at 13:16

## 2023-01-01 RX ADMIN — PANTOPRAZOLE SODIUM 40 MILLIGRAM(S): 20 TABLET, DELAYED RELEASE ORAL at 18:12

## 2023-01-01 RX ADMIN — Medication 0.2 MILLIGRAM(S): at 17:47

## 2023-01-01 RX ADMIN — Medication 3 MILLILITER(S): at 16:15

## 2023-01-01 RX ADMIN — Medication 4.99 MICROGRAM(S)/KG/MIN: at 07:50

## 2023-01-01 RX ADMIN — SODIUM CHLORIDE 4 MILLILITER(S): 9 INJECTION INTRAMUSCULAR; INTRAVENOUS; SUBCUTANEOUS at 04:45

## 2023-01-01 RX ADMIN — ALBUTEROL 2.5 MILLIGRAM(S): 90 AEROSOL, METERED ORAL at 03:34

## 2023-01-01 RX ADMIN — MIDODRINE HYDROCHLORIDE 40 MILLIGRAM(S): 2.5 TABLET ORAL at 05:07

## 2023-01-01 RX ADMIN — HUMAN INSULIN 5 UNIT(S): 100 INJECTION, SUSPENSION SUBCUTANEOUS at 17:59

## 2023-01-01 RX ADMIN — CHLORHEXIDINE GLUCONATE 15 MILLILITER(S): 213 SOLUTION TOPICAL at 05:39

## 2023-01-01 RX ADMIN — PANTOPRAZOLE SODIUM 40 MILLIGRAM(S): 20 TABLET, DELAYED RELEASE ORAL at 17:13

## 2023-01-01 RX ADMIN — Medication 1 DROP(S): at 15:30

## 2023-01-01 RX ADMIN — MEROPENEM 100 MILLIGRAM(S): 1 INJECTION INTRAVENOUS at 21:06

## 2023-01-01 RX ADMIN — Medication 25 MILLIGRAM(S): at 20:58

## 2023-01-01 RX ADMIN — Medication 25 MILLIGRAM(S): at 15:05

## 2023-01-01 RX ADMIN — Medication 1 DROP(S): at 14:44

## 2023-01-01 RX ADMIN — HUMAN INSULIN 11 UNIT(S): 100 INJECTION, SUSPENSION SUBCUTANEOUS at 00:45

## 2023-01-01 RX ADMIN — SODIUM CHLORIDE 2 GRAM(S): 9 INJECTION INTRAMUSCULAR; INTRAVENOUS; SUBCUTANEOUS at 17:30

## 2023-01-01 RX ADMIN — MEROPENEM 100 MILLIGRAM(S): 1 INJECTION INTRAVENOUS at 05:43

## 2023-01-01 RX ADMIN — BUMETANIDE 2 MILLIGRAM(S): 0.25 INJECTION INTRAMUSCULAR; INTRAVENOUS at 23:28

## 2023-01-01 RX ADMIN — HUMAN INSULIN 6 UNIT(S): 100 INJECTION, SUSPENSION SUBCUTANEOUS at 13:14

## 2023-01-01 RX ADMIN — Medication 2: at 05:28

## 2023-01-01 RX ADMIN — ATORVASTATIN CALCIUM 40 MILLIGRAM(S): 80 TABLET, FILM COATED ORAL at 22:44

## 2023-01-01 RX ADMIN — Medication 1 APPLICATION(S): at 11:30

## 2023-01-01 RX ADMIN — Medication 2: at 18:20

## 2023-01-01 RX ADMIN — Medication 6.23 MICROGRAM(S)/KG/MIN: at 10:27

## 2023-01-01 RX ADMIN — HEPARIN SODIUM 5000 UNIT(S): 5000 INJECTION INTRAVENOUS; SUBCUTANEOUS at 13:25

## 2023-01-01 RX ADMIN — MIDODRINE HYDROCHLORIDE 40 MILLIGRAM(S): 2.5 TABLET ORAL at 00:26

## 2023-01-01 RX ADMIN — DIPHENHYDRAMINE HYDROCHLORIDE AND LIDOCAINE HYDROCHLORIDE AND ALUMINUM HYDROXIDE AND MAGNESIUM HYDRO 10 MILLILITER(S): KIT at 00:38

## 2023-01-01 RX ADMIN — LETROZOLE 2.5 MILLIGRAM(S): 2.5 TABLET, FILM COATED ORAL at 10:32

## 2023-01-01 RX ADMIN — Medication 1 DROP(S): at 20:00

## 2023-01-01 RX ADMIN — Medication 1 DROP(S): at 17:46

## 2023-01-01 RX ADMIN — Medication 600 MILLIGRAM(S): at 05:54

## 2023-01-01 RX ADMIN — SEVELAMER CARBONATE 800 MILLIGRAM(S): 2400 POWDER, FOR SUSPENSION ORAL at 05:34

## 2023-01-01 RX ADMIN — Medication 1 DROP(S): at 03:04

## 2023-01-01 RX ADMIN — Medication 6 MILLIGRAM(S): at 21:08

## 2023-01-01 RX ADMIN — Medication 3 MILLILITER(S): at 03:48

## 2023-01-01 RX ADMIN — Medication 81 MILLIGRAM(S): at 14:17

## 2023-01-01 RX ADMIN — Medication 300 MILLIGRAM(S): at 12:38

## 2023-01-01 RX ADMIN — Medication 2: at 23:34

## 2023-01-01 RX ADMIN — Medication 4: at 11:41

## 2023-01-01 RX ADMIN — CHLORHEXIDINE GLUCONATE 1 APPLICATION(S): 213 SOLUTION TOPICAL at 05:14

## 2023-01-01 RX ADMIN — Medication 1000 MILLIGRAM(S): at 13:56

## 2023-01-01 RX ADMIN — DROXIDOPA 600 MILLIGRAM(S): 100 CAPSULE ORAL at 07:28

## 2023-01-01 RX ADMIN — Medication 1 APPLICATION(S): at 12:20

## 2023-01-01 RX ADMIN — FENTANYL CITRATE 100 MICROGRAM(S): 50 INJECTION INTRAVENOUS at 15:17

## 2023-01-01 RX ADMIN — HUMAN INSULIN 3 UNIT(S): 100 INJECTION, SUSPENSION SUBCUTANEOUS at 13:21

## 2023-01-01 RX ADMIN — ALBUTEROL 2.5 MILLIGRAM(S): 90 AEROSOL, METERED ORAL at 11:02

## 2023-01-01 RX ADMIN — PANTOPRAZOLE SODIUM 40 MILLIGRAM(S): 20 TABLET, DELAYED RELEASE ORAL at 17:17

## 2023-01-01 RX ADMIN — Medication 20 MILLIGRAM(S): at 05:23

## 2023-01-01 RX ADMIN — Medication 3 MILLILITER(S): at 21:50

## 2023-01-01 RX ADMIN — Medication 4: at 11:28

## 2023-01-01 RX ADMIN — Medication 1 DROP(S): at 17:43

## 2023-01-01 RX ADMIN — ATORVASTATIN CALCIUM 10 MILLIGRAM(S): 80 TABLET, FILM COATED ORAL at 22:58

## 2023-01-01 RX ADMIN — Medication 8: at 11:38

## 2023-01-01 RX ADMIN — HEPARIN SODIUM 5000 UNIT(S): 5000 INJECTION INTRAVENOUS; SUBCUTANEOUS at 14:40

## 2023-01-01 RX ADMIN — Medication 2: at 05:44

## 2023-01-01 RX ADMIN — Medication 10 MILLIGRAM(S): at 05:25

## 2023-01-01 RX ADMIN — ATORVASTATIN CALCIUM 40 MILLIGRAM(S): 80 TABLET, FILM COATED ORAL at 22:54

## 2023-01-01 RX ADMIN — HUMAN INSULIN 6 UNIT(S): 100 INJECTION, SUSPENSION SUBCUTANEOUS at 11:52

## 2023-01-01 RX ADMIN — Medication 300 MILLIGRAM(S): at 11:28

## 2023-01-01 RX ADMIN — Medication 1 APPLICATION(S): at 17:09

## 2023-01-01 RX ADMIN — Medication 20 MILLIGRAM(S): at 04:14

## 2023-01-01 RX ADMIN — MIDODRINE HYDROCHLORIDE 30 MILLIGRAM(S): 2.5 TABLET ORAL at 21:24

## 2023-01-01 RX ADMIN — PANTOPRAZOLE SODIUM 40 MILLIGRAM(S): 20 TABLET, DELAYED RELEASE ORAL at 18:02

## 2023-01-01 RX ADMIN — CHLORHEXIDINE GLUCONATE 15 MILLILITER(S): 213 SOLUTION TOPICAL at 18:08

## 2023-01-01 RX ADMIN — DROXIDOPA 200 MILLIGRAM(S): 100 CAPSULE ORAL at 17:46

## 2023-01-01 RX ADMIN — ATORVASTATIN CALCIUM 10 MILLIGRAM(S): 80 TABLET, FILM COATED ORAL at 21:46

## 2023-01-01 RX ADMIN — Medication 1 DROP(S): at 03:26

## 2023-01-01 RX ADMIN — Medication 1 DROP(S): at 17:40

## 2023-01-01 RX ADMIN — HUMAN INSULIN 4 UNIT(S): 100 INJECTION, SUSPENSION SUBCUTANEOUS at 17:21

## 2023-01-01 RX ADMIN — Medication 6.23 MICROGRAM(S)/KG/MIN: at 13:12

## 2023-01-01 RX ADMIN — CHLORHEXIDINE GLUCONATE 1 APPLICATION(S): 213 SOLUTION TOPICAL at 05:12

## 2023-01-01 RX ADMIN — POLYMYXIN B SULFATE 250 UNIT(S): 500000 INJECTION, POWDER, LYOPHILIZED, FOR SOLUTION INTRAMUSCULAR; INTRATHECAL; INTRAVENOUS; OPHTHALMIC at 21:07

## 2023-01-01 RX ADMIN — Medication 1 DROP(S): at 08:12

## 2023-01-01 RX ADMIN — SEVELAMER CARBONATE 1600 MILLIGRAM(S): 2400 POWDER, FOR SUSPENSION ORAL at 13:14

## 2023-01-01 RX ADMIN — Medication 4 MILLIGRAM(S): at 23:30

## 2023-01-01 RX ADMIN — DROXIDOPA 600 MILLIGRAM(S): 100 CAPSULE ORAL at 02:33

## 2023-01-01 RX ADMIN — Medication 600 MILLIGRAM(S): at 15:27

## 2023-01-01 RX ADMIN — HUMAN INSULIN 6 UNIT(S): 100 INJECTION, SUSPENSION SUBCUTANEOUS at 23:57

## 2023-01-01 RX ADMIN — ATORVASTATIN CALCIUM 40 MILLIGRAM(S): 80 TABLET, FILM COATED ORAL at 22:03

## 2023-01-01 RX ADMIN — Medication 1 APPLICATION(S): at 05:39

## 2023-01-01 RX ADMIN — Medication 2 MILLIGRAM(S): at 19:30

## 2023-01-01 RX ADMIN — HUMAN INSULIN 6 UNIT(S): 100 INJECTION, SUSPENSION SUBCUTANEOUS at 11:46

## 2023-01-01 RX ADMIN — Medication 1 DROP(S): at 15:03

## 2023-01-01 RX ADMIN — Medication 4: at 18:35

## 2023-01-01 RX ADMIN — MIDODRINE HYDROCHLORIDE 30 MILLIGRAM(S): 2.5 TABLET ORAL at 14:07

## 2023-01-01 RX ADMIN — Medication 1 APPLICATION(S): at 11:39

## 2023-01-01 RX ADMIN — MIDODRINE HYDROCHLORIDE 30 MILLIGRAM(S): 2.5 TABLET ORAL at 14:43

## 2023-01-01 RX ADMIN — SODIUM CHLORIDE 4 MILLILITER(S): 9 INJECTION INTRAMUSCULAR; INTRAVENOUS; SUBCUTANEOUS at 16:00

## 2023-01-01 RX ADMIN — Medication 25 MILLIGRAM(S): at 21:23

## 2023-01-01 RX ADMIN — Medication 81 MILLIGRAM(S): at 11:26

## 2023-01-01 RX ADMIN — ALTEPLASE 2 MILLIGRAM(S): KIT at 09:52

## 2023-01-01 RX ADMIN — CHLORHEXIDINE GLUCONATE 15 MILLILITER(S): 213 SOLUTION TOPICAL at 06:00

## 2023-01-01 RX ADMIN — HUMAN INSULIN 6 UNIT(S): 100 INJECTION, SUSPENSION SUBCUTANEOUS at 17:54

## 2023-01-01 RX ADMIN — Medication 4: at 00:15

## 2023-01-01 RX ADMIN — Medication 650 MILLIGRAM(S): at 23:03

## 2023-01-01 RX ADMIN — DONEPEZIL HYDROCHLORIDE 10 MILLIGRAM(S): 10 TABLET, FILM COATED ORAL at 22:54

## 2023-01-01 RX ADMIN — ERYTHROPOIETIN 10000 UNIT(S): 10000 INJECTION, SOLUTION INTRAVENOUS; SUBCUTANEOUS at 10:34

## 2023-01-01 RX ADMIN — DEXMEDETOMIDINE HYDROCHLORIDE IN 0.9% SODIUM CHLORIDE 1.66 MICROGRAM(S)/KG/HR: 4 INJECTION INTRAVENOUS at 08:12

## 2023-01-01 RX ADMIN — Medication 3 MILLILITER(S): at 15:41

## 2023-01-01 RX ADMIN — CHLORHEXIDINE GLUCONATE 15 MILLILITER(S): 213 SOLUTION TOPICAL at 17:35

## 2023-01-01 RX ADMIN — CHLORHEXIDINE GLUCONATE 15 MILLILITER(S): 213 SOLUTION TOPICAL at 05:21

## 2023-01-01 RX ADMIN — HUMAN INSULIN 6 UNIT(S): 100 INJECTION, SUSPENSION SUBCUTANEOUS at 05:39

## 2023-01-01 RX ADMIN — Medication 3 MILLILITER(S): at 07:32

## 2023-01-01 RX ADMIN — Medication 300 MILLIGRAM(S): at 11:50

## 2023-01-01 RX ADMIN — ALBUTEROL 2.5 MILLIGRAM(S): 90 AEROSOL, METERED ORAL at 04:11

## 2023-01-01 RX ADMIN — Medication 325 MILLIGRAM(S): at 12:00

## 2023-01-01 RX ADMIN — Medication 1 DROP(S): at 10:18

## 2023-01-01 RX ADMIN — Medication 1 DROP(S): at 20:17

## 2023-01-01 RX ADMIN — ATORVASTATIN CALCIUM 10 MILLIGRAM(S): 80 TABLET, FILM COATED ORAL at 21:45

## 2023-01-01 RX ADMIN — Medication 400 MILLIGRAM(S): at 05:48

## 2023-01-01 RX ADMIN — DIPHENHYDRAMINE HYDROCHLORIDE AND LIDOCAINE HYDROCHLORIDE AND ALUMINUM HYDROXIDE AND MAGNESIUM HYDRO 10 MILLILITER(S): KIT at 17:13

## 2023-01-01 RX ADMIN — Medication 600 MILLIGRAM(S): at 13:16

## 2023-01-01 RX ADMIN — Medication 3: at 06:29

## 2023-01-01 RX ADMIN — Medication 25 MILLIGRAM(S): at 13:01

## 2023-01-01 RX ADMIN — HUMAN INSULIN 3 UNIT(S): 100 INJECTION, SUSPENSION SUBCUTANEOUS at 05:39

## 2023-01-01 RX ADMIN — MIDODRINE HYDROCHLORIDE 40 MILLIGRAM(S): 2.5 TABLET ORAL at 05:16

## 2023-01-01 RX ADMIN — Medication 2: at 17:14

## 2023-01-01 RX ADMIN — Medication 1 DROP(S): at 21:25

## 2023-01-01 RX ADMIN — ATORVASTATIN CALCIUM 40 MILLIGRAM(S): 80 TABLET, FILM COATED ORAL at 22:15

## 2023-01-01 RX ADMIN — HEPARIN SODIUM 5000 UNIT(S): 5000 INJECTION INTRAVENOUS; SUBCUTANEOUS at 13:07

## 2023-01-01 RX ADMIN — HUMAN INSULIN 3 UNIT(S): 100 INJECTION, SUSPENSION SUBCUTANEOUS at 12:21

## 2023-01-01 RX ADMIN — Medication 2: at 23:25

## 2023-01-01 RX ADMIN — SODIUM CHLORIDE 1 GRAM(S): 9 INJECTION INTRAMUSCULAR; INTRAVENOUS; SUBCUTANEOUS at 17:39

## 2023-01-01 RX ADMIN — Medication 1 DROP(S): at 01:34

## 2023-01-01 RX ADMIN — ALBUTEROL 2.5 MILLIGRAM(S): 90 AEROSOL, METERED ORAL at 21:33

## 2023-01-01 RX ADMIN — DIPHENHYDRAMINE HYDROCHLORIDE AND LIDOCAINE HYDROCHLORIDE AND ALUMINUM HYDROXIDE AND MAGNESIUM HYDRO 10 MILLILITER(S): KIT at 17:30

## 2023-01-01 RX ADMIN — Medication 3 MILLILITER(S): at 15:00

## 2023-01-01 RX ADMIN — Medication 3 MILLILITER(S): at 20:50

## 2023-01-01 RX ADMIN — CHLORHEXIDINE GLUCONATE 15 MILLILITER(S): 213 SOLUTION TOPICAL at 05:44

## 2023-01-01 RX ADMIN — Medication 1 DROP(S): at 14:30

## 2023-01-01 RX ADMIN — Medication 3 MILLILITER(S): at 16:02

## 2023-01-01 RX ADMIN — LEVETIRACETAM 1000 MILLIGRAM(S): 250 TABLET, FILM COATED ORAL at 13:05

## 2023-01-01 RX ADMIN — CALAMINE AND ZINC OXIDE AND PHENOL 1 APPLICATION(S): 160; 10 LOTION TOPICAL at 12:13

## 2023-01-01 RX ADMIN — Medication 300 MILLIGRAM(S): at 11:33

## 2023-01-01 RX ADMIN — HEPARIN SODIUM 5000 UNIT(S): 5000 INJECTION INTRAVENOUS; SUBCUTANEOUS at 14:06

## 2023-01-01 RX ADMIN — Medication 1 DROP(S): at 05:40

## 2023-01-01 RX ADMIN — Medication 650 MILLIGRAM(S): at 21:40

## 2023-01-01 RX ADMIN — Medication 1 APPLICATION(S): at 13:12

## 2023-01-01 RX ADMIN — DIPHENHYDRAMINE HYDROCHLORIDE AND LIDOCAINE HYDROCHLORIDE AND ALUMINUM HYDROXIDE AND MAGNESIUM HYDRO 10 MILLILITER(S): KIT at 05:20

## 2023-01-01 RX ADMIN — MUPIROCIN 1 APPLICATION(S): 20 OINTMENT TOPICAL at 17:13

## 2023-01-01 RX ADMIN — Medication 1 DROP(S): at 01:02

## 2023-01-01 RX ADMIN — Medication 1 APPLICATION(S): at 05:25

## 2023-01-01 RX ADMIN — Medication 3 MILLILITER(S): at 15:31

## 2023-01-01 RX ADMIN — CHLORHEXIDINE GLUCONATE 15 MILLILITER(S): 213 SOLUTION TOPICAL at 05:37

## 2023-01-01 RX ADMIN — Medication 1 DROP(S): at 10:31

## 2023-01-01 RX ADMIN — Medication 4: at 05:26

## 2023-01-01 RX ADMIN — SODIUM CHLORIDE 1 GRAM(S): 9 INJECTION INTRAMUSCULAR; INTRAVENOUS; SUBCUTANEOUS at 17:55

## 2023-01-01 RX ADMIN — MIDODRINE HYDROCHLORIDE 40 MILLIGRAM(S): 2.5 TABLET ORAL at 00:29

## 2023-01-01 RX ADMIN — Medication 650 MILLIGRAM(S): at 21:58

## 2023-01-01 RX ADMIN — Medication 8: at 17:47

## 2023-01-01 RX ADMIN — Medication 600 MILLIGRAM(S): at 22:18

## 2023-01-01 RX ADMIN — Medication 3 MILLILITER(S): at 09:29

## 2023-01-01 RX ADMIN — PROPOFOL 3.99 MICROGRAM(S)/KG/MIN: 10 INJECTION, EMULSION INTRAVENOUS at 22:55

## 2023-01-01 RX ADMIN — Medication 1 DROP(S): at 17:21

## 2023-01-01 RX ADMIN — HEPARIN SODIUM 5000 UNIT(S): 5000 INJECTION INTRAVENOUS; SUBCUTANEOUS at 14:56

## 2023-01-01 RX ADMIN — MIDODRINE HYDROCHLORIDE 40 MILLIGRAM(S): 2.5 TABLET ORAL at 23:23

## 2023-01-01 RX ADMIN — MIDODRINE HYDROCHLORIDE 40 MILLIGRAM(S): 2.5 TABLET ORAL at 12:37

## 2023-01-01 RX ADMIN — HUMAN INSULIN 6 UNIT(S): 100 INJECTION, SUSPENSION SUBCUTANEOUS at 17:56

## 2023-01-01 RX ADMIN — BUMETANIDE 2 MILLIGRAM(S): 0.25 INJECTION INTRAMUSCULAR; INTRAVENOUS at 00:27

## 2023-01-01 RX ADMIN — Medication 3 MILLILITER(S): at 15:52

## 2023-01-01 RX ADMIN — Medication 400 MILLIGRAM(S): at 17:31

## 2023-01-01 RX ADMIN — FENTANYL CITRATE 100 MICROGRAM(S): 50 INJECTION INTRAVENOUS at 04:37

## 2023-01-01 RX ADMIN — Medication 81 MILLIGRAM(S): at 12:37

## 2023-01-01 RX ADMIN — APIXABAN 10 MILLIGRAM(S): 2.5 TABLET, FILM COATED ORAL at 17:16

## 2023-01-01 RX ADMIN — HEPARIN SODIUM 10.5 UNIT(S)/HR: 5000 INJECTION INTRAVENOUS; SUBCUTANEOUS at 12:28

## 2023-01-01 RX ADMIN — Medication 1 DROP(S): at 14:53

## 2023-01-01 RX ADMIN — MIDODRINE HYDROCHLORIDE 30 MILLIGRAM(S): 2.5 TABLET ORAL at 09:18

## 2023-01-01 RX ADMIN — SODIUM CHLORIDE 1 GRAM(S): 9 INJECTION INTRAMUSCULAR; INTRAVENOUS; SUBCUTANEOUS at 17:09

## 2023-01-01 RX ADMIN — SEVELAMER CARBONATE 1600 MILLIGRAM(S): 2400 POWDER, FOR SUSPENSION ORAL at 13:01

## 2023-01-01 RX ADMIN — SEVELAMER CARBONATE 1600 MILLIGRAM(S): 2400 POWDER, FOR SUSPENSION ORAL at 05:34

## 2023-01-01 RX ADMIN — DEXMEDETOMIDINE HYDROCHLORIDE IN 0.9% SODIUM CHLORIDE 1.66 MICROGRAM(S)/KG/HR: 4 INJECTION INTRAVENOUS at 20:26

## 2023-01-01 RX ADMIN — Medication 3 MILLILITER(S): at 09:57

## 2023-01-01 RX ADMIN — Medication 300 MILLIGRAM(S): at 12:00

## 2023-01-01 RX ADMIN — DAPTOMYCIN 120 MILLIGRAM(S): 500 INJECTION, POWDER, LYOPHILIZED, FOR SOLUTION INTRAVENOUS at 17:56

## 2023-01-01 RX ADMIN — Medication 7.48 MICROGRAM(S)/KG/MIN: at 20:40

## 2023-01-01 RX ADMIN — SODIUM CHLORIDE 4 MILLILITER(S): 9 INJECTION INTRAMUSCULAR; INTRAVENOUS; SUBCUTANEOUS at 16:11

## 2023-01-01 RX ADMIN — Medication 400 MILLIGRAM(S): at 17:22

## 2023-01-01 RX ADMIN — Medication 4.99 MICROGRAM(S)/KG/MIN: at 19:21

## 2023-01-01 RX ADMIN — Medication 1 PACKET(S): at 17:59

## 2023-01-01 RX ADMIN — SODIUM CHLORIDE 4 MILLILITER(S): 9 INJECTION INTRAMUSCULAR; INTRAVENOUS; SUBCUTANEOUS at 10:06

## 2023-01-01 RX ADMIN — Medication 1 APPLICATION(S): at 17:32

## 2023-01-01 RX ADMIN — Medication 1 DROP(S): at 18:20

## 2023-01-01 RX ADMIN — Medication 6: at 23:34

## 2023-01-01 RX ADMIN — VALACYCLOVIR 500 MILLIGRAM(S): 500 TABLET, FILM COATED ORAL at 15:47

## 2023-01-01 RX ADMIN — Medication 1 DROP(S): at 21:54

## 2023-01-01 RX ADMIN — Medication 8: at 12:58

## 2023-01-01 RX ADMIN — Medication 300 MILLIGRAM(S): at 12:19

## 2023-01-01 RX ADMIN — Medication 2: at 23:29

## 2023-01-01 RX ADMIN — SODIUM CHLORIDE 1 GRAM(S): 9 INJECTION INTRAMUSCULAR; INTRAVENOUS; SUBCUTANEOUS at 18:37

## 2023-01-01 RX ADMIN — HUMAN INSULIN 6 UNIT(S): 100 INJECTION, SUSPENSION SUBCUTANEOUS at 11:20

## 2023-01-01 RX ADMIN — HUMAN INSULIN 3 UNIT(S): 100 INJECTION, SUSPENSION SUBCUTANEOUS at 18:55

## 2023-01-01 RX ADMIN — INSULIN GLARGINE 28 UNIT(S): 100 INJECTION, SOLUTION SUBCUTANEOUS at 22:00

## 2023-01-01 RX ADMIN — Medication 2: at 23:44

## 2023-01-01 RX ADMIN — Medication 2.49 MICROGRAM(S)/KG/MIN: at 17:56

## 2023-01-01 RX ADMIN — Medication 1 APPLICATION(S): at 06:05

## 2023-01-01 RX ADMIN — Medication 4: at 00:50

## 2023-01-01 RX ADMIN — SODIUM CHLORIDE 3 MILLILITER(S): 9 INJECTION INTRAMUSCULAR; INTRAVENOUS; SUBCUTANEOUS at 13:18

## 2023-01-01 RX ADMIN — Medication 2000 UNIT(S): at 12:19

## 2023-01-01 RX ADMIN — DROXIDOPA 600 MILLIGRAM(S): 100 CAPSULE ORAL at 09:09

## 2023-01-01 RX ADMIN — Medication 2: at 05:14

## 2023-01-01 RX ADMIN — MIDODRINE HYDROCHLORIDE 40 MILLIGRAM(S): 2.5 TABLET ORAL at 17:44

## 2023-01-01 RX ADMIN — Medication 1 DROP(S): at 05:25

## 2023-01-01 RX ADMIN — HUMAN INSULIN 6 UNIT(S): 100 INJECTION, SUSPENSION SUBCUTANEOUS at 11:53

## 2023-01-01 RX ADMIN — Medication 100 MILLIGRAM(S): at 06:51

## 2023-01-01 RX ADMIN — Medication 1 APPLICATION(S): at 05:35

## 2023-01-01 RX ADMIN — HEPARIN SODIUM 5000 UNIT(S): 5000 INJECTION INTRAVENOUS; SUBCUTANEOUS at 15:03

## 2023-01-01 RX ADMIN — PANTOPRAZOLE SODIUM 40 MILLIGRAM(S): 20 TABLET, DELAYED RELEASE ORAL at 05:54

## 2023-01-01 RX ADMIN — POLYMYXIN B SULFATE 250 UNIT(S): 500000 INJECTION, POWDER, LYOPHILIZED, FOR SOLUTION INTRAMUSCULAR; INTRATHECAL; INTRAVENOUS; OPHTHALMIC at 05:32

## 2023-01-01 RX ADMIN — Medication 2000 UNIT(S): at 12:59

## 2023-01-01 RX ADMIN — Medication 3 MILLILITER(S): at 10:13

## 2023-01-01 RX ADMIN — MIDODRINE HYDROCHLORIDE 40 MILLIGRAM(S): 2.5 TABLET ORAL at 06:17

## 2023-01-01 RX ADMIN — DROXIDOPA 600 MILLIGRAM(S): 100 CAPSULE ORAL at 14:53

## 2023-01-01 RX ADMIN — Medication 1 SPRAY(S): at 17:28

## 2023-01-01 RX ADMIN — SODIUM CHLORIDE 1 GRAM(S): 9 INJECTION INTRAMUSCULAR; INTRAVENOUS; SUBCUTANEOUS at 18:17

## 2023-01-01 RX ADMIN — Medication 6 MILLIGRAM(S): at 22:08

## 2023-01-01 RX ADMIN — Medication 400 MILLIGRAM(S): at 14:19

## 2023-01-01 RX ADMIN — ALBUTEROL 2.5 MILLIGRAM(S): 90 AEROSOL, METERED ORAL at 10:31

## 2023-01-01 RX ADMIN — Medication 3 UNIT(S): at 09:07

## 2023-01-01 RX ADMIN — FLUCONAZOLE 100 MILLIGRAM(S): 150 TABLET ORAL at 20:23

## 2023-01-01 RX ADMIN — SODIUM CHLORIDE 4 MILLILITER(S): 9 INJECTION INTRAMUSCULAR; INTRAVENOUS; SUBCUTANEOUS at 02:52

## 2023-01-01 RX ADMIN — Medication 1 DROP(S): at 21:03

## 2023-01-01 RX ADMIN — Medication 3 MILLILITER(S): at 22:24

## 2023-01-01 RX ADMIN — Medication 1 DROP(S): at 10:42

## 2023-01-01 RX ADMIN — HEPARIN SODIUM 8.5 UNIT(S)/HR: 5000 INJECTION INTRAVENOUS; SUBCUTANEOUS at 20:09

## 2023-01-01 RX ADMIN — Medication 2: at 17:41

## 2023-01-01 RX ADMIN — ATORVASTATIN CALCIUM 40 MILLIGRAM(S): 80 TABLET, FILM COATED ORAL at 21:59

## 2023-01-01 RX ADMIN — MEROPENEM 100 MILLIGRAM(S): 1 INJECTION INTRAVENOUS at 11:50

## 2023-01-01 RX ADMIN — Medication 0: at 16:35

## 2023-01-01 RX ADMIN — Medication 3 MILLILITER(S): at 06:52

## 2023-01-01 RX ADMIN — PROPOFOL 3.99 MICROGRAM(S)/KG/MIN: 10 INJECTION, EMULSION INTRAVENOUS at 19:49

## 2023-01-01 RX ADMIN — Medication 1 DROP(S): at 13:12

## 2023-01-01 RX ADMIN — FLUCONAZOLE 100 MILLIGRAM(S): 150 TABLET ORAL at 20:38

## 2023-01-01 RX ADMIN — Medication 6.23 MICROGRAM(S)/KG/MIN: at 22:28

## 2023-01-01 RX ADMIN — Medication 650 MILLIGRAM(S): at 17:40

## 2023-01-01 RX ADMIN — DAPTOMYCIN 120 MILLIGRAM(S): 500 INJECTION, POWDER, LYOPHILIZED, FOR SOLUTION INTRAVENOUS at 15:17

## 2023-01-01 RX ADMIN — SODIUM CHLORIDE 2 GRAM(S): 9 INJECTION INTRAMUSCULAR; INTRAVENOUS; SUBCUTANEOUS at 17:26

## 2023-01-01 RX ADMIN — MIDODRINE HYDROCHLORIDE 30 MILLIGRAM(S): 2.5 TABLET ORAL at 21:27

## 2023-01-01 RX ADMIN — HUMAN INSULIN 6 UNIT(S): 100 INJECTION, SUSPENSION SUBCUTANEOUS at 18:35

## 2023-01-01 RX ADMIN — Medication 2: at 05:05

## 2023-01-01 RX ADMIN — LETROZOLE 2.5 MILLIGRAM(S): 2.5 TABLET, FILM COATED ORAL at 13:18

## 2023-01-01 RX ADMIN — Medication 100 MILLIGRAM(S): at 18:04

## 2023-01-01 RX ADMIN — Medication 1 DROP(S): at 01:41

## 2023-01-01 RX ADMIN — Medication 1000 MILLIGRAM(S): at 00:02

## 2023-01-01 RX ADMIN — PANTOPRAZOLE SODIUM 40 MILLIGRAM(S): 20 TABLET, DELAYED RELEASE ORAL at 05:37

## 2023-01-01 RX ADMIN — Medication 1 PACKET(S): at 11:48

## 2023-01-01 RX ADMIN — Medication 1 APPLICATION(S): at 05:55

## 2023-01-01 RX ADMIN — SODIUM CHLORIDE 4 MILLILITER(S): 9 INJECTION INTRAMUSCULAR; INTRAVENOUS; SUBCUTANEOUS at 04:07

## 2023-01-01 RX ADMIN — Medication 6: at 17:40

## 2023-01-01 RX ADMIN — Medication 1 DROP(S): at 17:23

## 2023-01-01 RX ADMIN — SODIUM CHLORIDE 4 MILLILITER(S): 9 INJECTION INTRAMUSCULAR; INTRAVENOUS; SUBCUTANEOUS at 21:26

## 2023-01-01 RX ADMIN — Medication 1 PACKET(S): at 12:11

## 2023-01-01 RX ADMIN — Medication 2: at 23:50

## 2023-01-01 RX ADMIN — Medication 1 APPLICATION(S): at 00:25

## 2023-01-01 RX ADMIN — Medication 2: at 17:20

## 2023-01-01 RX ADMIN — Medication 1: at 05:26

## 2023-01-01 RX ADMIN — CHLORHEXIDINE GLUCONATE 15 MILLILITER(S): 213 SOLUTION TOPICAL at 17:39

## 2023-01-01 RX ADMIN — DROXIDOPA 600 MILLIGRAM(S): 100 CAPSULE ORAL at 02:56

## 2023-01-01 RX ADMIN — MIDODRINE HYDROCHLORIDE 30 MILLIGRAM(S): 2.5 TABLET ORAL at 15:23

## 2023-01-01 RX ADMIN — HUMAN INSULIN 6 UNIT(S): 100 INJECTION, SUSPENSION SUBCUTANEOUS at 12:03

## 2023-01-01 RX ADMIN — Medication 4: at 17:18

## 2023-01-01 RX ADMIN — Medication 3 MILLILITER(S): at 15:16

## 2023-01-01 RX ADMIN — CISATRACURIUM BESYLATE 20 MILLIGRAM(S): 2 INJECTION INTRAVENOUS at 15:21

## 2023-01-01 RX ADMIN — Medication 3 MILLILITER(S): at 16:24

## 2023-01-01 RX ADMIN — LEVETIRACETAM 1000 MILLIGRAM(S): 250 TABLET, FILM COATED ORAL at 12:53

## 2023-01-01 RX ADMIN — CHLORHEXIDINE GLUCONATE 15 MILLILITER(S): 213 SOLUTION TOPICAL at 05:35

## 2023-01-01 RX ADMIN — Medication 1 DROP(S): at 02:20

## 2023-01-01 RX ADMIN — Medication 600 MILLIGRAM(S): at 22:02

## 2023-01-01 RX ADMIN — Medication 1 APPLICATION(S): at 12:43

## 2023-01-01 RX ADMIN — CALAMINE AND ZINC OXIDE AND PHENOL 1 APPLICATION(S): 160; 10 LOTION TOPICAL at 13:07

## 2023-01-01 RX ADMIN — Medication 1 DROP(S): at 12:34

## 2023-01-01 RX ADMIN — Medication 1 APPLICATION(S): at 12:56

## 2023-01-01 RX ADMIN — Medication 40 MILLIGRAM(S): at 17:18

## 2023-01-01 RX ADMIN — Medication 3 MILLILITER(S): at 10:57

## 2023-01-01 RX ADMIN — MIDODRINE HYDROCHLORIDE 30 MILLIGRAM(S): 2.5 TABLET ORAL at 21:50

## 2023-01-01 RX ADMIN — Medication 100 MILLIGRAM(S): at 22:28

## 2023-01-01 RX ADMIN — SODIUM CHLORIDE 4 MILLILITER(S): 9 INJECTION INTRAMUSCULAR; INTRAVENOUS; SUBCUTANEOUS at 22:30

## 2023-01-01 RX ADMIN — Medication 4: at 23:32

## 2023-01-01 RX ADMIN — HEPARIN SODIUM 5000 UNIT(S): 5000 INJECTION INTRAVENOUS; SUBCUTANEOUS at 13:19

## 2023-01-01 RX ADMIN — HUMAN INSULIN 3 UNIT(S): 100 INJECTION, SUSPENSION SUBCUTANEOUS at 12:01

## 2023-01-01 RX ADMIN — SEVELAMER CARBONATE 1600 MILLIGRAM(S): 2400 POWDER, FOR SUSPENSION ORAL at 14:24

## 2023-01-01 RX ADMIN — HEPARIN SODIUM 5000 UNIT(S): 5000 INJECTION INTRAVENOUS; SUBCUTANEOUS at 23:51

## 2023-01-01 RX ADMIN — CHLORHEXIDINE GLUCONATE 15 MILLILITER(S): 213 SOLUTION TOPICAL at 17:29

## 2023-01-01 RX ADMIN — Medication 1 DROP(S): at 02:04

## 2023-01-01 RX ADMIN — Medication 6 MICROGRAM(S)/MIN: at 19:31

## 2023-01-01 RX ADMIN — Medication 6 MILLIGRAM(S): at 20:13

## 2023-01-01 RX ADMIN — Medication 100 MILLIGRAM(S): at 05:19

## 2023-01-01 RX ADMIN — Medication 500 MILLIGRAM(S): at 05:14

## 2023-01-01 RX ADMIN — SODIUM CHLORIDE 4 MILLILITER(S): 9 INJECTION INTRAMUSCULAR; INTRAVENOUS; SUBCUTANEOUS at 16:30

## 2023-01-01 RX ADMIN — FENTANYL CITRATE 50 MICROGRAM(S): 50 INJECTION INTRAVENOUS at 10:00

## 2023-01-01 RX ADMIN — Medication 1 PACKET(S): at 11:51

## 2023-01-01 RX ADMIN — Medication 81 MILLIGRAM(S): at 11:41

## 2023-01-01 RX ADMIN — Medication 2: at 11:18

## 2023-01-01 RX ADMIN — Medication 1 APPLICATION(S): at 17:30

## 2023-01-01 RX ADMIN — Medication 4 DROP(S): at 06:04

## 2023-01-01 RX ADMIN — Medication 1 DROP(S): at 23:49

## 2023-01-01 RX ADMIN — Medication 1 APPLICATION(S): at 06:11

## 2023-01-01 RX ADMIN — CHLORHEXIDINE GLUCONATE 1 APPLICATION(S): 213 SOLUTION TOPICAL at 05:32

## 2023-01-01 RX ADMIN — HUMAN INSULIN 3 UNIT(S): 100 INJECTION, SUSPENSION SUBCUTANEOUS at 05:47

## 2023-01-01 RX ADMIN — MIDODRINE HYDROCHLORIDE 40 MILLIGRAM(S): 2.5 TABLET ORAL at 23:45

## 2023-01-01 RX ADMIN — MIDODRINE HYDROCHLORIDE 30 MILLIGRAM(S): 2.5 TABLET ORAL at 05:36

## 2023-01-01 RX ADMIN — Medication 6 MILLIGRAM(S): at 20:48

## 2023-01-01 RX ADMIN — Medication 6 MILLIGRAM(S): at 08:19

## 2023-01-01 RX ADMIN — FENTANYL CITRATE 100 MICROGRAM(S): 50 INJECTION INTRAVENOUS at 15:29

## 2023-01-01 RX ADMIN — HUMAN INSULIN 6 UNIT(S): 100 INJECTION, SUSPENSION SUBCUTANEOUS at 06:33

## 2023-01-01 RX ADMIN — SODIUM CHLORIDE 4 MILLILITER(S): 9 INJECTION INTRAMUSCULAR; INTRAVENOUS; SUBCUTANEOUS at 11:15

## 2023-01-01 RX ADMIN — PANTOPRAZOLE SODIUM 40 MILLIGRAM(S): 20 TABLET, DELAYED RELEASE ORAL at 06:01

## 2023-01-01 RX ADMIN — Medication 1 DROP(S): at 14:00

## 2023-01-01 RX ADMIN — HUMAN INSULIN 6 UNIT(S): 100 INJECTION, SUSPENSION SUBCUTANEOUS at 06:53

## 2023-01-01 RX ADMIN — MIDODRINE HYDROCHLORIDE 30 MILLIGRAM(S): 2.5 TABLET ORAL at 21:26

## 2023-01-01 RX ADMIN — CHLORHEXIDINE GLUCONATE 15 MILLILITER(S): 213 SOLUTION TOPICAL at 17:59

## 2023-01-01 RX ADMIN — ALBUTEROL 2.5 MILLIGRAM(S): 90 AEROSOL, METERED ORAL at 04:06

## 2023-01-01 RX ADMIN — Medication 1 DROP(S): at 10:01

## 2023-01-01 RX ADMIN — DROXIDOPA 600 MILLIGRAM(S): 100 CAPSULE ORAL at 17:39

## 2023-01-01 RX ADMIN — Medication 3 MILLILITER(S): at 23:20

## 2023-01-01 RX ADMIN — ATORVASTATIN CALCIUM 40 MILLIGRAM(S): 80 TABLET, FILM COATED ORAL at 21:48

## 2023-01-01 RX ADMIN — MIDODRINE HYDROCHLORIDE 10 MILLIGRAM(S): 2.5 TABLET ORAL at 01:48

## 2023-01-01 RX ADMIN — SODIUM CHLORIDE 3 MILLILITER(S): 9 INJECTION INTRAMUSCULAR; INTRAVENOUS; SUBCUTANEOUS at 22:48

## 2023-01-01 RX ADMIN — DIPHENHYDRAMINE HYDROCHLORIDE AND LIDOCAINE HYDROCHLORIDE AND ALUMINUM HYDROXIDE AND MAGNESIUM HYDRO 10 MILLILITER(S): KIT at 05:01

## 2023-01-01 RX ADMIN — DIPHENHYDRAMINE HYDROCHLORIDE AND LIDOCAINE HYDROCHLORIDE AND ALUMINUM HYDROXIDE AND MAGNESIUM HYDRO 10 MILLILITER(S): KIT at 18:10

## 2023-01-01 RX ADMIN — ERYTHROPOIETIN 10000 UNIT(S): 10000 INJECTION, SOLUTION INTRAVENOUS; SUBCUTANEOUS at 11:53

## 2023-01-01 RX ADMIN — CALAMINE AND ZINC OXIDE AND PHENOL 1 APPLICATION(S): 160; 10 LOTION TOPICAL at 12:12

## 2023-01-01 RX ADMIN — BUMETANIDE 2 MILLIGRAM(S): 0.25 INJECTION INTRAMUSCULAR; INTRAVENOUS at 10:55

## 2023-01-01 RX ADMIN — PROPOFOL 7.98 MICROGRAM(S)/KG/MIN: 10 INJECTION, EMULSION INTRAVENOUS at 12:29

## 2023-01-01 RX ADMIN — ATORVASTATIN CALCIUM 10 MILLIGRAM(S): 80 TABLET, FILM COATED ORAL at 21:58

## 2023-01-01 RX ADMIN — CHLORHEXIDINE GLUCONATE 1 APPLICATION(S): 213 SOLUTION TOPICAL at 06:34

## 2023-01-01 RX ADMIN — HUMAN INSULIN 6 UNIT(S): 100 INJECTION, SUSPENSION SUBCUTANEOUS at 18:33

## 2023-01-01 RX ADMIN — Medication 500 MILLIGRAM(S): at 21:31

## 2023-01-01 RX ADMIN — Medication 1 DROP(S): at 23:03

## 2023-01-01 RX ADMIN — Medication 325 MILLIGRAM(S): at 21:06

## 2023-01-01 RX ADMIN — DROXIDOPA 600 MILLIGRAM(S): 100 CAPSULE ORAL at 05:49

## 2023-01-01 RX ADMIN — HEPARIN SODIUM 10.5 UNIT(S)/HR: 5000 INJECTION INTRAVENOUS; SUBCUTANEOUS at 19:47

## 2023-01-01 RX ADMIN — DROXIDOPA 600 MILLIGRAM(S): 100 CAPSULE ORAL at 10:21

## 2023-01-01 RX ADMIN — APIXABAN 5 MILLIGRAM(S): 2.5 TABLET, FILM COATED ORAL at 05:20

## 2023-01-01 RX ADMIN — Medication 4: at 05:27

## 2023-01-01 RX ADMIN — MIDODRINE HYDROCHLORIDE 30 MILLIGRAM(S): 2.5 TABLET ORAL at 05:19

## 2023-01-01 RX ADMIN — SODIUM CHLORIDE 1000 MILLILITER(S): 9 INJECTION, SOLUTION INTRAVENOUS at 06:37

## 2023-01-01 RX ADMIN — SODIUM CHLORIDE 3 MILLILITER(S): 9 INJECTION INTRAMUSCULAR; INTRAVENOUS; SUBCUTANEOUS at 00:28

## 2023-01-01 RX ADMIN — Medication 1 DROP(S): at 21:05

## 2023-01-01 RX ADMIN — Medication 100 MILLIGRAM(S): at 17:41

## 2023-01-01 RX ADMIN — SODIUM CHLORIDE 4 MILLILITER(S): 9 INJECTION INTRAMUSCULAR; INTRAVENOUS; SUBCUTANEOUS at 10:05

## 2023-01-01 RX ADMIN — Medication 1 DROP(S): at 05:14

## 2023-01-01 RX ADMIN — Medication 1 DROP(S): at 03:06

## 2023-01-01 RX ADMIN — HEPARIN SODIUM 5000 UNIT(S): 5000 INJECTION INTRAVENOUS; SUBCUTANEOUS at 06:00

## 2023-01-01 RX ADMIN — Medication 1 APPLICATION(S): at 05:40

## 2023-01-01 RX ADMIN — Medication 50 MILLIGRAM(S): at 22:01

## 2023-01-01 RX ADMIN — SODIUM CHLORIDE 1000 MILLILITER(S): 9 INJECTION, SOLUTION INTRAVENOUS at 13:22

## 2023-01-01 RX ADMIN — Medication 1 APPLICATION(S): at 23:48

## 2023-01-01 RX ADMIN — HUMAN INSULIN 4 UNIT(S): 100 INJECTION, SUSPENSION SUBCUTANEOUS at 11:40

## 2023-01-01 RX ADMIN — Medication 81 MILLIGRAM(S): at 14:19

## 2023-01-01 RX ADMIN — Medication 3 MILLILITER(S): at 22:52

## 2023-01-01 RX ADMIN — Medication 300 MILLIGRAM(S): at 11:37

## 2023-01-01 RX ADMIN — Medication 1 DROP(S): at 17:49

## 2023-01-01 RX ADMIN — ATORVASTATIN CALCIUM 10 MILLIGRAM(S): 80 TABLET, FILM COATED ORAL at 22:31

## 2023-01-01 RX ADMIN — SODIUM CHLORIDE 3 MILLILITER(S): 9 INJECTION INTRAMUSCULAR; INTRAVENOUS; SUBCUTANEOUS at 06:36

## 2023-01-01 RX ADMIN — Medication 1 APPLICATION(S): at 05:34

## 2023-01-01 RX ADMIN — SODIUM CHLORIDE 4 MILLILITER(S): 9 INJECTION INTRAMUSCULAR; INTRAVENOUS; SUBCUTANEOUS at 07:55

## 2023-01-01 RX ADMIN — PANTOPRAZOLE SODIUM 40 MILLIGRAM(S): 20 TABLET, DELAYED RELEASE ORAL at 17:50

## 2023-01-01 RX ADMIN — Medication 300 MILLIGRAM(S): at 12:53

## 2023-01-01 RX ADMIN — MIDODRINE HYDROCHLORIDE 40 MILLIGRAM(S): 2.5 TABLET ORAL at 23:02

## 2023-01-01 RX ADMIN — Medication 500 MILLIGRAM(S): at 15:14

## 2023-01-01 RX ADMIN — HUMAN INSULIN 3 UNIT(S): 100 INJECTION, SUSPENSION SUBCUTANEOUS at 11:36

## 2023-01-01 RX ADMIN — Medication 4: at 18:16

## 2023-01-01 RX ADMIN — DROXIDOPA 600 MILLIGRAM(S): 100 CAPSULE ORAL at 12:08

## 2023-01-01 RX ADMIN — CHLORHEXIDINE GLUCONATE 15 MILLILITER(S): 213 SOLUTION TOPICAL at 06:12

## 2023-01-01 RX ADMIN — Medication 1 DROP(S): at 21:08

## 2023-01-01 RX ADMIN — DIPHENHYDRAMINE HYDROCHLORIDE AND LIDOCAINE HYDROCHLORIDE AND ALUMINUM HYDROXIDE AND MAGNESIUM HYDRO 10 MILLILITER(S): KIT at 12:47

## 2023-01-01 RX ADMIN — Medication 300 MILLIGRAM(S): at 11:13

## 2023-01-01 RX ADMIN — Medication 3 MILLILITER(S): at 22:59

## 2023-01-01 RX ADMIN — Medication 1000 MILLIGRAM(S): at 09:50

## 2023-01-01 RX ADMIN — MIDODRINE HYDROCHLORIDE 40 MILLIGRAM(S): 2.5 TABLET ORAL at 05:06

## 2023-01-01 RX ADMIN — Medication 250 MILLIGRAM(S): at 19:53

## 2023-01-01 RX ADMIN — Medication 3 MILLILITER(S): at 22:48

## 2023-01-01 RX ADMIN — HUMAN INSULIN 6 UNIT(S): 100 INJECTION, SUSPENSION SUBCUTANEOUS at 05:05

## 2023-01-01 RX ADMIN — Medication 1 DROP(S): at 17:17

## 2023-01-01 RX ADMIN — SODIUM CHLORIDE 4 MILLILITER(S): 9 INJECTION INTRAMUSCULAR; INTRAVENOUS; SUBCUTANEOUS at 20:29

## 2023-01-01 RX ADMIN — Medication 1 APPLICATION(S): at 11:54

## 2023-01-01 RX ADMIN — ATORVASTATIN CALCIUM 40 MILLIGRAM(S): 80 TABLET, FILM COATED ORAL at 22:12

## 2023-01-01 RX ADMIN — HUMAN INSULIN 6 UNIT(S): 100 INJECTION, SUSPENSION SUBCUTANEOUS at 23:54

## 2023-01-01 RX ADMIN — MIDODRINE HYDROCHLORIDE 40 MILLIGRAM(S): 2.5 TABLET ORAL at 12:01

## 2023-01-01 RX ADMIN — MIDODRINE HYDROCHLORIDE 40 MILLIGRAM(S): 2.5 TABLET ORAL at 06:21

## 2023-01-01 RX ADMIN — CHLORHEXIDINE GLUCONATE 1 APPLICATION(S): 213 SOLUTION TOPICAL at 05:38

## 2023-01-01 RX ADMIN — Medication 3 UNIT(S): at 08:18

## 2023-01-01 RX ADMIN — HUMAN INSULIN 1 UNIT(S): 100 INJECTION, SUSPENSION SUBCUTANEOUS at 11:47

## 2023-01-01 RX ADMIN — APIXABAN 5 MILLIGRAM(S): 2.5 TABLET, FILM COATED ORAL at 05:10

## 2023-01-01 RX ADMIN — ALBUTEROL 2.5 MILLIGRAM(S): 90 AEROSOL, METERED ORAL at 10:21

## 2023-01-01 RX ADMIN — HEPARIN SODIUM 5000 UNIT(S): 5000 INJECTION INTRAVENOUS; SUBCUTANEOUS at 22:07

## 2023-01-01 RX ADMIN — Medication 125 MILLILITER(S): at 13:23

## 2023-01-01 RX ADMIN — CARVEDILOL PHOSPHATE 37.5 MILLIGRAM(S): 80 CAPSULE, EXTENDED RELEASE ORAL at 05:29

## 2023-01-01 RX ADMIN — PANTOPRAZOLE SODIUM 40 MILLIGRAM(S): 20 TABLET, DELAYED RELEASE ORAL at 06:04

## 2023-01-01 RX ADMIN — HUMAN INSULIN 4 UNIT(S): 100 INJECTION, SUSPENSION SUBCUTANEOUS at 12:03

## 2023-01-01 RX ADMIN — BUMETANIDE 2 MILLIGRAM(S): 0.25 INJECTION INTRAMUSCULAR; INTRAVENOUS at 05:45

## 2023-01-01 RX ADMIN — Medication 1 DROP(S): at 13:51

## 2023-01-01 RX ADMIN — SODIUM CHLORIDE 4 MILLILITER(S): 9 INJECTION INTRAMUSCULAR; INTRAVENOUS; SUBCUTANEOUS at 08:59

## 2023-01-01 RX ADMIN — PANTOPRAZOLE SODIUM 40 MILLIGRAM(S): 20 TABLET, DELAYED RELEASE ORAL at 17:20

## 2023-01-01 RX ADMIN — FENTANYL CITRATE 100 MICROGRAM(S): 50 INJECTION INTRAVENOUS at 11:14

## 2023-01-01 RX ADMIN — HUMAN INSULIN 6 UNIT(S): 100 INJECTION, SUSPENSION SUBCUTANEOUS at 11:58

## 2023-01-01 RX ADMIN — SEVELAMER CARBONATE 1600 MILLIGRAM(S): 2400 POWDER, FOR SUSPENSION ORAL at 21:50

## 2023-01-01 RX ADMIN — ATORVASTATIN CALCIUM 40 MILLIGRAM(S): 80 TABLET, FILM COATED ORAL at 21:01

## 2023-01-01 RX ADMIN — MIDODRINE HYDROCHLORIDE 30 MILLIGRAM(S): 2.5 TABLET ORAL at 05:16

## 2023-01-01 RX ADMIN — Medication 3 MILLILITER(S): at 10:06

## 2023-01-01 RX ADMIN — HEPARIN SODIUM 9.5 UNIT(S)/HR: 5000 INJECTION INTRAVENOUS; SUBCUTANEOUS at 00:48

## 2023-01-01 RX ADMIN — MIDODRINE HYDROCHLORIDE 40 MILLIGRAM(S): 2.5 TABLET ORAL at 21:22

## 2023-01-01 RX ADMIN — ATORVASTATIN CALCIUM 40 MILLIGRAM(S): 80 TABLET, FILM COATED ORAL at 21:02

## 2023-01-01 RX ADMIN — APIXABAN 5 MILLIGRAM(S): 2.5 TABLET, FILM COATED ORAL at 05:36

## 2023-01-01 RX ADMIN — SODIUM CHLORIDE 2 GRAM(S): 9 INJECTION INTRAMUSCULAR; INTRAVENOUS; SUBCUTANEOUS at 05:43

## 2023-01-01 RX ADMIN — Medication 0.3 MILLIGRAM(S): at 22:02

## 2023-01-01 RX ADMIN — Medication 1 DROP(S): at 04:22

## 2023-01-01 RX ADMIN — ATORVASTATIN CALCIUM 10 MILLIGRAM(S): 80 TABLET, FILM COATED ORAL at 00:02

## 2023-01-01 RX ADMIN — HUMAN INSULIN 6 UNIT(S): 100 INJECTION, SUSPENSION SUBCUTANEOUS at 11:15

## 2023-01-01 RX ADMIN — Medication 3 MILLILITER(S): at 16:53

## 2023-01-01 RX ADMIN — ALBUTEROL 2.5 MILLIGRAM(S): 90 AEROSOL, METERED ORAL at 16:14

## 2023-01-01 RX ADMIN — PANTOPRAZOLE SODIUM 40 MILLIGRAM(S): 20 TABLET, DELAYED RELEASE ORAL at 06:54

## 2023-01-01 RX ADMIN — Medication 6: at 00:13

## 2023-01-01 RX ADMIN — Medication 4: at 05:19

## 2023-01-01 RX ADMIN — CHLORHEXIDINE GLUCONATE 15 MILLILITER(S): 213 SOLUTION TOPICAL at 17:06

## 2023-01-01 RX ADMIN — POLYMYXIN B SULFATE 250 UNIT(S): 500000 INJECTION, POWDER, LYOPHILIZED, FOR SOLUTION INTRAMUSCULAR; INTRATHECAL; INTRAVENOUS; OPHTHALMIC at 18:08

## 2023-01-01 RX ADMIN — CHLORHEXIDINE GLUCONATE 1 APPLICATION(S): 213 SOLUTION TOPICAL at 05:53

## 2023-01-01 RX ADMIN — ALBUTEROL 2.5 MILLIGRAM(S): 90 AEROSOL, METERED ORAL at 21:13

## 2023-01-01 RX ADMIN — Medication 1 DROP(S): at 02:32

## 2023-01-01 RX ADMIN — Medication 1 PACKET(S): at 11:12

## 2023-01-01 RX ADMIN — APIXABAN 5 MILLIGRAM(S): 2.5 TABLET, FILM COATED ORAL at 17:14

## 2023-01-01 RX ADMIN — LEVETIRACETAM 1000 MILLIGRAM(S): 250 TABLET, FILM COATED ORAL at 11:34

## 2023-01-01 RX ADMIN — ERYTHROPOIETIN 10000 UNIT(S): 10000 INJECTION, SOLUTION INTRAVENOUS; SUBCUTANEOUS at 07:21

## 2023-01-01 RX ADMIN — HUMAN INSULIN 6 UNIT(S): 100 INJECTION, SUSPENSION SUBCUTANEOUS at 23:39

## 2023-01-01 RX ADMIN — APIXABAN 5 MILLIGRAM(S): 2.5 TABLET, FILM COATED ORAL at 17:51

## 2023-01-01 RX ADMIN — MIDAZOLAM HYDROCHLORIDE 2 MILLIGRAM(S): 1 INJECTION, SOLUTION INTRAMUSCULAR; INTRAVENOUS at 17:10

## 2023-01-01 RX ADMIN — PANTOPRAZOLE SODIUM 40 MILLIGRAM(S): 20 TABLET, DELAYED RELEASE ORAL at 05:35

## 2023-01-01 RX ADMIN — CHLORHEXIDINE GLUCONATE 15 MILLILITER(S): 213 SOLUTION TOPICAL at 17:13

## 2023-01-01 RX ADMIN — SODIUM CHLORIDE 1 GRAM(S): 9 INJECTION INTRAMUSCULAR; INTRAVENOUS; SUBCUTANEOUS at 06:02

## 2023-01-01 RX ADMIN — HEPARIN SODIUM 5000 UNIT(S): 5000 INJECTION INTRAVENOUS; SUBCUTANEOUS at 05:24

## 2023-01-01 RX ADMIN — SODIUM CHLORIDE 4 MILLILITER(S): 9 INJECTION INTRAMUSCULAR; INTRAVENOUS; SUBCUTANEOUS at 03:18

## 2023-01-01 RX ADMIN — Medication 650 MILLIGRAM(S): at 22:54

## 2023-01-01 RX ADMIN — MIDODRINE HYDROCHLORIDE 30 MILLIGRAM(S): 2.5 TABLET ORAL at 06:13

## 2023-01-01 RX ADMIN — BUMETANIDE 2 MILLIGRAM(S): 0.25 INJECTION INTRAMUSCULAR; INTRAVENOUS at 14:46

## 2023-01-01 RX ADMIN — Medication 50 MILLIGRAM(S): at 05:24

## 2023-01-01 RX ADMIN — DROXIDOPA 600 MILLIGRAM(S): 100 CAPSULE ORAL at 11:47

## 2023-01-01 RX ADMIN — LEVETIRACETAM 1000 MILLIGRAM(S): 250 TABLET, FILM COATED ORAL at 12:32

## 2023-01-01 RX ADMIN — Medication 81 MILLIGRAM(S): at 15:09

## 2023-01-01 RX ADMIN — HUMAN INSULIN 6 UNIT(S): 100 INJECTION, SUSPENSION SUBCUTANEOUS at 05:52

## 2023-01-01 RX ADMIN — Medication 4: at 17:19

## 2023-01-01 RX ADMIN — HUMAN INSULIN 11 UNIT(S): 100 INJECTION, SUSPENSION SUBCUTANEOUS at 06:00

## 2023-01-01 RX ADMIN — MEROPENEM 100 MILLIGRAM(S): 1 INJECTION INTRAVENOUS at 10:16

## 2023-01-01 RX ADMIN — Medication 200 MILLIGRAM(S): at 05:23

## 2023-01-01 RX ADMIN — PANTOPRAZOLE SODIUM 40 MILLIGRAM(S): 20 TABLET, DELAYED RELEASE ORAL at 06:10

## 2023-01-01 RX ADMIN — Medication 1 DROP(S): at 14:28

## 2023-01-01 RX ADMIN — Medication 3 MILLILITER(S): at 10:28

## 2023-01-01 RX ADMIN — Medication 650 MILLIGRAM(S): at 00:16

## 2023-01-01 RX ADMIN — SODIUM CHLORIDE 2 GRAM(S): 9 INJECTION INTRAMUSCULAR; INTRAVENOUS; SUBCUTANEOUS at 18:53

## 2023-01-01 RX ADMIN — ATORVASTATIN CALCIUM 40 MILLIGRAM(S): 80 TABLET, FILM COATED ORAL at 21:35

## 2023-01-01 RX ADMIN — SODIUM CHLORIDE 1 GRAM(S): 9 INJECTION INTRAMUSCULAR; INTRAVENOUS; SUBCUTANEOUS at 05:04

## 2023-01-01 RX ADMIN — HEPARIN SODIUM 5000 UNIT(S): 5000 INJECTION INTRAVENOUS; SUBCUTANEOUS at 21:31

## 2023-01-01 RX ADMIN — Medication 1 DROP(S): at 09:12

## 2023-01-01 RX ADMIN — Medication 6.23 MICROGRAM(S)/KG/MIN: at 20:19

## 2023-01-01 RX ADMIN — Medication 250 MILLIGRAM(S): at 08:39

## 2023-01-01 RX ADMIN — HUMAN INSULIN 6 UNIT(S): 100 INJECTION, SUSPENSION SUBCUTANEOUS at 05:33

## 2023-01-01 RX ADMIN — Medication 3 MILLILITER(S): at 15:43

## 2023-01-01 RX ADMIN — CHLORHEXIDINE GLUCONATE 15 MILLILITER(S): 213 SOLUTION TOPICAL at 17:55

## 2023-01-01 RX ADMIN — Medication 1 APPLICATION(S): at 23:29

## 2023-01-01 RX ADMIN — Medication 1 TABLET(S): at 12:59

## 2023-01-01 RX ADMIN — SODIUM CHLORIDE 4 MILLILITER(S): 9 INJECTION INTRAMUSCULAR; INTRAVENOUS; SUBCUTANEOUS at 03:30

## 2023-01-01 RX ADMIN — CARVEDILOL PHOSPHATE 37.5 MILLIGRAM(S): 80 CAPSULE, EXTENDED RELEASE ORAL at 05:44

## 2023-01-01 RX ADMIN — Medication 1 APPLICATION(S): at 05:28

## 2023-01-01 RX ADMIN — SODIUM CHLORIDE 4 MILLILITER(S): 9 INJECTION INTRAMUSCULAR; INTRAVENOUS; SUBCUTANEOUS at 09:51

## 2023-01-01 RX ADMIN — Medication 8 MILLIGRAM(S): at 21:45

## 2023-01-01 RX ADMIN — CHLORHEXIDINE GLUCONATE 1 APPLICATION(S): 213 SOLUTION TOPICAL at 05:52

## 2023-01-01 RX ADMIN — HEPARIN SODIUM 1000 UNIT(S)/HR: 5000 INJECTION INTRAVENOUS; SUBCUTANEOUS at 10:10

## 2023-01-01 RX ADMIN — HUMAN INSULIN 3 UNIT(S): 100 INJECTION, SUSPENSION SUBCUTANEOUS at 12:46

## 2023-01-01 RX ADMIN — HUMAN INSULIN 12 UNIT(S): 100 INJECTION, SUSPENSION SUBCUTANEOUS at 18:00

## 2023-01-01 RX ADMIN — Medication 0.3 MILLIGRAM(S): at 13:32

## 2023-01-01 RX ADMIN — Medication 3 MILLILITER(S): at 15:37

## 2023-01-01 RX ADMIN — Medication 650 MILLIGRAM(S): at 22:34

## 2023-01-01 RX ADMIN — Medication 3 MILLILITER(S): at 14:47

## 2023-01-01 RX ADMIN — CALAMINE AND ZINC OXIDE AND PHENOL 1 APPLICATION(S): 160; 10 LOTION TOPICAL at 13:56

## 2023-01-01 RX ADMIN — Medication 4: at 06:31

## 2023-01-01 RX ADMIN — ALBUTEROL 2.5 MILLIGRAM(S): 90 AEROSOL, METERED ORAL at 10:49

## 2023-01-01 RX ADMIN — HUMAN INSULIN 3 UNIT(S): 100 INJECTION, SUSPENSION SUBCUTANEOUS at 23:46

## 2023-01-01 RX ADMIN — Medication 4 DROP(S): at 06:11

## 2023-01-01 RX ADMIN — HUMAN INSULIN 6 UNIT(S): 100 INJECTION, SUSPENSION SUBCUTANEOUS at 12:58

## 2023-01-01 RX ADMIN — SODIUM CHLORIDE 1 GRAM(S): 9 INJECTION INTRAMUSCULAR; INTRAVENOUS; SUBCUTANEOUS at 06:31

## 2023-01-01 RX ADMIN — Medication 1 APPLICATION(S): at 05:48

## 2023-01-01 RX ADMIN — Medication 1 DROP(S): at 15:37

## 2023-01-01 RX ADMIN — SEVELAMER CARBONATE 800 MILLIGRAM(S): 2400 POWDER, FOR SUSPENSION ORAL at 05:38

## 2023-01-01 RX ADMIN — PANTOPRAZOLE SODIUM 40 MILLIGRAM(S): 20 TABLET, DELAYED RELEASE ORAL at 18:17

## 2023-01-01 RX ADMIN — Medication 6: at 08:32

## 2023-01-01 RX ADMIN — Medication 3 MILLILITER(S): at 19:43

## 2023-01-01 RX ADMIN — SODIUM CHLORIDE 3 MILLILITER(S): 9 INJECTION INTRAMUSCULAR; INTRAVENOUS; SUBCUTANEOUS at 18:02

## 2023-01-01 RX ADMIN — SODIUM CHLORIDE 4 MILLILITER(S): 9 INJECTION INTRAMUSCULAR; INTRAVENOUS; SUBCUTANEOUS at 10:57

## 2023-01-01 RX ADMIN — Medication 0.5 MILLIGRAM(S): at 05:27

## 2023-01-01 RX ADMIN — SODIUM CHLORIDE 1 GRAM(S): 9 INJECTION INTRAMUSCULAR; INTRAVENOUS; SUBCUTANEOUS at 05:24

## 2023-01-01 RX ADMIN — Medication 3 MILLILITER(S): at 07:34

## 2023-01-01 RX ADMIN — Medication 2: at 18:30

## 2023-01-01 RX ADMIN — PANTOPRAZOLE SODIUM 40 MILLIGRAM(S): 20 TABLET, DELAYED RELEASE ORAL at 17:49

## 2023-01-01 RX ADMIN — SEVELAMER CARBONATE 1600 MILLIGRAM(S): 2400 POWDER, FOR SUSPENSION ORAL at 05:44

## 2023-01-01 RX ADMIN — Medication 650 MILLIGRAM(S): at 22:02

## 2023-01-01 RX ADMIN — Medication 3 MILLILITER(S): at 20:52

## 2023-01-01 RX ADMIN — DIPHENHYDRAMINE HYDROCHLORIDE AND LIDOCAINE HYDROCHLORIDE AND ALUMINUM HYDROXIDE AND MAGNESIUM HYDRO 10 MILLILITER(S): KIT at 05:43

## 2023-01-01 RX ADMIN — HUMAN INSULIN 5 UNIT(S): 100 INJECTION, SUSPENSION SUBCUTANEOUS at 06:13

## 2023-01-01 RX ADMIN — Medication 2000 UNIT(S): at 11:04

## 2023-01-01 RX ADMIN — BUMETANIDE 2 MILLIGRAM(S): 0.25 INJECTION INTRAMUSCULAR; INTRAVENOUS at 19:59

## 2023-01-01 RX ADMIN — Medication 40 MILLIGRAM(S): at 05:11

## 2023-01-01 RX ADMIN — SODIUM CHLORIDE 3 MILLILITER(S): 9 INJECTION INTRAMUSCULAR; INTRAVENOUS; SUBCUTANEOUS at 18:16

## 2023-01-01 RX ADMIN — Medication 100 MILLIGRAM(S): at 11:09

## 2023-01-01 RX ADMIN — POLYMYXIN B SULFATE 250 UNIT(S): 500000 INJECTION, POWDER, LYOPHILIZED, FOR SOLUTION INTRAMUSCULAR; INTRATHECAL; INTRAVENOUS; OPHTHALMIC at 18:10

## 2023-01-01 RX ADMIN — Medication 81 MILLIGRAM(S): at 11:04

## 2023-01-01 RX ADMIN — MIDODRINE HYDROCHLORIDE 40 MILLIGRAM(S): 2.5 TABLET ORAL at 05:17

## 2023-01-01 RX ADMIN — PANTOPRAZOLE SODIUM 40 MILLIGRAM(S): 20 TABLET, DELAYED RELEASE ORAL at 05:30

## 2023-01-01 RX ADMIN — Medication 3: at 17:55

## 2023-01-01 RX ADMIN — DROXIDOPA 600 MILLIGRAM(S): 100 CAPSULE ORAL at 01:53

## 2023-01-01 RX ADMIN — Medication 1 APPLICATION(S): at 17:19

## 2023-01-01 RX ADMIN — Medication 1: at 23:39

## 2023-01-01 RX ADMIN — PANTOPRAZOLE SODIUM 40 MILLIGRAM(S): 20 TABLET, DELAYED RELEASE ORAL at 17:39

## 2023-01-01 RX ADMIN — HUMAN INSULIN 6 UNIT(S): 100 INJECTION, SUSPENSION SUBCUTANEOUS at 06:45

## 2023-01-01 RX ADMIN — SODIUM CHLORIDE 4 MILLILITER(S): 9 INJECTION INTRAMUSCULAR; INTRAVENOUS; SUBCUTANEOUS at 16:20

## 2023-01-01 RX ADMIN — CHLORHEXIDINE GLUCONATE 15 MILLILITER(S): 213 SOLUTION TOPICAL at 18:31

## 2023-01-01 RX ADMIN — Medication 650 MILLIGRAM(S): at 13:42

## 2023-01-01 RX ADMIN — Medication 40 MILLIGRAM(S): at 05:29

## 2023-01-01 RX ADMIN — Medication 1 PACKET(S): at 11:28

## 2023-01-01 RX ADMIN — HEPARIN SODIUM 5000 UNIT(S): 5000 INJECTION INTRAVENOUS; SUBCUTANEOUS at 21:38

## 2023-01-01 RX ADMIN — LEVETIRACETAM 1000 MILLIGRAM(S): 250 TABLET, FILM COATED ORAL at 11:59

## 2023-01-01 RX ADMIN — Medication 3 MILLILITER(S): at 06:35

## 2023-01-01 RX ADMIN — Medication 4 DROP(S): at 05:28

## 2023-01-01 RX ADMIN — HEPARIN SODIUM 9.5 UNIT(S)/HR: 5000 INJECTION INTRAVENOUS; SUBCUTANEOUS at 23:50

## 2023-01-01 RX ADMIN — Medication 1 DROP(S): at 01:45

## 2023-01-01 RX ADMIN — Medication 2: at 11:38

## 2023-01-01 RX ADMIN — SODIUM CHLORIDE 1 GRAM(S): 9 INJECTION INTRAMUSCULAR; INTRAVENOUS; SUBCUTANEOUS at 18:54

## 2023-01-01 RX ADMIN — INSULIN HUMAN 5 UNIT(S): 100 INJECTION, SOLUTION SUBCUTANEOUS at 06:41

## 2023-01-01 RX ADMIN — SEVELAMER CARBONATE 1600 MILLIGRAM(S): 2400 POWDER, FOR SUSPENSION ORAL at 14:49

## 2023-01-01 RX ADMIN — HEPARIN SODIUM 5000 UNIT(S): 5000 INJECTION INTRAVENOUS; SUBCUTANEOUS at 05:07

## 2023-01-01 RX ADMIN — Medication 1 APPLICATION(S): at 05:32

## 2023-01-01 RX ADMIN — HUMAN INSULIN 6 UNIT(S): 100 INJECTION, SUSPENSION SUBCUTANEOUS at 11:38

## 2023-01-01 RX ADMIN — Medication 40 MILLIGRAM(S): at 05:53

## 2023-01-01 RX ADMIN — Medication 4: at 17:41

## 2023-01-01 RX ADMIN — Medication 650 MILLIGRAM(S): at 22:26

## 2023-01-01 RX ADMIN — PROPOFOL 2 MICROGRAM(S)/KG/MIN: 10 INJECTION, EMULSION INTRAVENOUS at 08:04

## 2023-01-01 RX ADMIN — Medication 650 MILLIGRAM(S): at 21:10

## 2023-01-01 RX ADMIN — DIPHENHYDRAMINE HYDROCHLORIDE AND LIDOCAINE HYDROCHLORIDE AND ALUMINUM HYDROXIDE AND MAGNESIUM HYDRO 10 MILLILITER(S): KIT at 00:02

## 2023-01-01 RX ADMIN — Medication 1 APPLICATION(S): at 05:03

## 2023-01-01 RX ADMIN — SODIUM CHLORIDE 1 GRAM(S): 9 INJECTION INTRAMUSCULAR; INTRAVENOUS; SUBCUTANEOUS at 17:44

## 2023-01-01 RX ADMIN — SODIUM CHLORIDE 1 GRAM(S): 9 INJECTION INTRAMUSCULAR; INTRAVENOUS; SUBCUTANEOUS at 17:01

## 2023-01-01 RX ADMIN — DROXIDOPA 200 MILLIGRAM(S): 100 CAPSULE ORAL at 11:17

## 2023-01-01 RX ADMIN — ALBUTEROL 2.5 MILLIGRAM(S): 90 AEROSOL, METERED ORAL at 15:46

## 2023-01-01 RX ADMIN — Medication 1 SPRAY(S): at 05:16

## 2023-01-01 RX ADMIN — MIDODRINE HYDROCHLORIDE 40 MILLIGRAM(S): 2.5 TABLET ORAL at 07:50

## 2023-01-01 RX ADMIN — Medication 1 APPLICATION(S): at 00:45

## 2023-01-01 RX ADMIN — Medication 1 DROP(S): at 12:18

## 2023-01-01 RX ADMIN — SODIUM CHLORIDE 4 MILLILITER(S): 9 INJECTION INTRAMUSCULAR; INTRAVENOUS; SUBCUTANEOUS at 15:54

## 2023-01-01 RX ADMIN — Medication 81 MILLIGRAM(S): at 12:43

## 2023-01-01 RX ADMIN — MIDODRINE HYDROCHLORIDE 40 MILLIGRAM(S): 2.5 TABLET ORAL at 13:07

## 2023-01-01 RX ADMIN — Medication 1 DROP(S): at 02:30

## 2023-01-01 RX ADMIN — Medication 4: at 23:29

## 2023-01-01 RX ADMIN — CHLORHEXIDINE GLUCONATE 1 APPLICATION(S): 213 SOLUTION TOPICAL at 06:16

## 2023-01-01 RX ADMIN — Medication 600 MILLIGRAM(S): at 05:56

## 2023-01-01 RX ADMIN — Medication 1 DROP(S): at 11:17

## 2023-01-01 RX ADMIN — ATORVASTATIN CALCIUM 40 MILLIGRAM(S): 80 TABLET, FILM COATED ORAL at 21:54

## 2023-01-01 RX ADMIN — Medication 6.23 MICROGRAM(S)/KG/MIN: at 17:56

## 2023-01-01 RX ADMIN — MEROPENEM 100 MILLIGRAM(S): 1 INJECTION INTRAVENOUS at 17:32

## 2023-01-01 RX ADMIN — DROXIDOPA 600 MILLIGRAM(S): 100 CAPSULE ORAL at 02:50

## 2023-01-01 RX ADMIN — Medication 4: at 05:57

## 2023-01-01 RX ADMIN — Medication 3: at 13:22

## 2023-01-01 RX ADMIN — LEVETIRACETAM 250 MILLIGRAM(S): 250 TABLET, FILM COATED ORAL at 19:57

## 2023-01-01 RX ADMIN — CHLORHEXIDINE GLUCONATE 1 APPLICATION(S): 213 SOLUTION TOPICAL at 06:27

## 2023-01-01 RX ADMIN — HUMAN INSULIN 3 UNIT(S): 100 INJECTION, SUSPENSION SUBCUTANEOUS at 11:52

## 2023-01-01 RX ADMIN — ERYTHROPOIETIN 10000 UNIT(S): 10000 INJECTION, SOLUTION INTRAVENOUS; SUBCUTANEOUS at 07:31

## 2023-01-01 RX ADMIN — Medication 4: at 11:33

## 2023-01-01 RX ADMIN — SODIUM CHLORIDE 4 MILLILITER(S): 9 INJECTION INTRAMUSCULAR; INTRAVENOUS; SUBCUTANEOUS at 22:45

## 2023-01-01 RX ADMIN — Medication 1 DROP(S): at 21:44

## 2023-01-01 RX ADMIN — PROPOFOL 3.99 MICROGRAM(S)/KG/MIN: 10 INJECTION, EMULSION INTRAVENOUS at 07:49

## 2023-01-01 RX ADMIN — BUMETANIDE 5 MG/HR: 0.25 INJECTION INTRAMUSCULAR; INTRAVENOUS at 11:00

## 2023-01-01 RX ADMIN — HUMAN INSULIN 6 UNIT(S): 100 INJECTION, SUSPENSION SUBCUTANEOUS at 17:43

## 2023-01-01 RX ADMIN — Medication 1 APPLICATION(S): at 11:17

## 2023-01-01 RX ADMIN — MEROPENEM 100 MILLIGRAM(S): 1 INJECTION INTRAVENOUS at 17:46

## 2023-01-01 RX ADMIN — DROXIDOPA 600 MILLIGRAM(S): 100 CAPSULE ORAL at 17:05

## 2023-01-01 RX ADMIN — Medication 3 MILLILITER(S): at 04:22

## 2023-01-01 RX ADMIN — CHLORHEXIDINE GLUCONATE 1 APPLICATION(S): 213 SOLUTION TOPICAL at 06:00

## 2023-01-01 RX ADMIN — SODIUM CHLORIDE 4 MILLILITER(S): 9 INJECTION INTRAMUSCULAR; INTRAVENOUS; SUBCUTANEOUS at 04:12

## 2023-01-01 RX ADMIN — SODIUM CHLORIDE 1 GRAM(S): 9 INJECTION INTRAMUSCULAR; INTRAVENOUS; SUBCUTANEOUS at 05:36

## 2023-01-01 RX ADMIN — Medication 81 MILLIGRAM(S): at 11:38

## 2023-01-01 RX ADMIN — Medication 1 DROP(S): at 11:44

## 2023-01-01 RX ADMIN — Medication 1 PACKET(S): at 11:56

## 2023-01-01 RX ADMIN — Medication 4: at 23:37

## 2023-01-01 RX ADMIN — MEROPENEM 100 MILLIGRAM(S): 1 INJECTION INTRAVENOUS at 05:14

## 2023-01-01 RX ADMIN — HUMAN INSULIN 3 UNIT(S): 100 INJECTION, SUSPENSION SUBCUTANEOUS at 11:54

## 2023-01-01 RX ADMIN — Medication 3 MILLILITER(S): at 03:38

## 2023-01-01 RX ADMIN — Medication 1 TABLET(S): at 12:58

## 2023-01-01 RX ADMIN — PROPOFOL 7.98 MICROGRAM(S)/KG/MIN: 10 INJECTION, EMULSION INTRAVENOUS at 20:12

## 2023-01-01 RX ADMIN — SEVELAMER CARBONATE 1600 MILLIGRAM(S): 2400 POWDER, FOR SUSPENSION ORAL at 22:18

## 2023-01-01 RX ADMIN — MIDODRINE HYDROCHLORIDE 40 MILLIGRAM(S): 2.5 TABLET ORAL at 06:34

## 2023-01-01 RX ADMIN — PANTOPRAZOLE SODIUM 40 MILLIGRAM(S): 20 TABLET, DELAYED RELEASE ORAL at 17:27

## 2023-01-01 RX ADMIN — Medication 3 MILLILITER(S): at 15:49

## 2023-01-01 RX ADMIN — Medication 1 DROP(S): at 10:45

## 2023-01-01 RX ADMIN — HEPARIN SODIUM 1100 UNIT(S)/HR: 5000 INJECTION INTRAVENOUS; SUBCUTANEOUS at 20:57

## 2023-01-01 RX ADMIN — Medication 2: at 11:21

## 2023-01-01 RX ADMIN — HUMAN INSULIN 3 UNIT(S): 100 INJECTION, SUSPENSION SUBCUTANEOUS at 13:40

## 2023-01-01 RX ADMIN — SODIUM CHLORIDE 4 MILLILITER(S): 9 INJECTION INTRAMUSCULAR; INTRAVENOUS; SUBCUTANEOUS at 04:11

## 2023-01-01 RX ADMIN — Medication 1 PACKET(S): at 21:09

## 2023-01-01 RX ADMIN — Medication 6.23 MICROGRAM(S)/KG/MIN: at 16:46

## 2023-01-01 RX ADMIN — ALBUTEROL 2.5 MILLIGRAM(S): 90 AEROSOL, METERED ORAL at 10:27

## 2023-01-01 RX ADMIN — Medication 1 APPLICATION(S): at 13:14

## 2023-01-01 RX ADMIN — Medication 2: at 12:58

## 2023-01-01 RX ADMIN — MIDODRINE HYDROCHLORIDE 40 MILLIGRAM(S): 2.5 TABLET ORAL at 11:53

## 2023-01-01 RX ADMIN — MIDODRINE HYDROCHLORIDE 30 MILLIGRAM(S): 2.5 TABLET ORAL at 05:51

## 2023-01-01 RX ADMIN — Medication 4: at 05:38

## 2023-01-01 RX ADMIN — Medication 1 DROP(S): at 21:28

## 2023-01-01 RX ADMIN — Medication 1 DROP(S): at 17:44

## 2023-01-01 RX ADMIN — BUMETANIDE 2 MILLIGRAM(S): 0.25 INJECTION INTRAMUSCULAR; INTRAVENOUS at 14:53

## 2023-01-01 RX ADMIN — ERYTHROPOIETIN 10000 UNIT(S): 10000 INJECTION, SOLUTION INTRAVENOUS; SUBCUTANEOUS at 07:58

## 2023-01-01 RX ADMIN — HUMAN INSULIN 6 UNIT(S): 100 INJECTION, SUSPENSION SUBCUTANEOUS at 06:02

## 2023-01-01 RX ADMIN — LEVETIRACETAM 1000 MILLIGRAM(S): 250 TABLET, FILM COATED ORAL at 12:26

## 2023-01-01 RX ADMIN — POLYMYXIN B SULFATE 500 UNIT(S): 500000 INJECTION, POWDER, LYOPHILIZED, FOR SOLUTION INTRAMUSCULAR; INTRATHECAL; INTRAVENOUS; OPHTHALMIC at 20:00

## 2023-01-01 RX ADMIN — Medication 3 MILLILITER(S): at 17:28

## 2023-01-01 RX ADMIN — Medication 4: at 18:07

## 2023-01-01 RX ADMIN — MEROPENEM 100 MILLIGRAM(S): 1 INJECTION INTRAVENOUS at 18:32

## 2023-01-01 RX ADMIN — HUMAN INSULIN 18 UNIT(S): 100 INJECTION, SUSPENSION SUBCUTANEOUS at 00:49

## 2023-01-01 RX ADMIN — ERYTHROPOIETIN 10000 UNIT(S): 10000 INJECTION, SOLUTION INTRAVENOUS; SUBCUTANEOUS at 17:24

## 2023-01-01 RX ADMIN — CHLORHEXIDINE GLUCONATE 1 APPLICATION(S): 213 SOLUTION TOPICAL at 05:37

## 2023-01-01 RX ADMIN — MIDODRINE HYDROCHLORIDE 40 MILLIGRAM(S): 2.5 TABLET ORAL at 00:31

## 2023-01-01 RX ADMIN — Medication 1 DROP(S): at 14:56

## 2023-01-01 RX ADMIN — SODIUM CHLORIDE 1 GRAM(S): 9 INJECTION INTRAMUSCULAR; INTRAVENOUS; SUBCUTANEOUS at 05:41

## 2023-01-01 RX ADMIN — HUMAN INSULIN 6 UNIT(S): 100 INJECTION, SUSPENSION SUBCUTANEOUS at 19:29

## 2023-01-01 RX ADMIN — Medication 3 MILLILITER(S): at 00:01

## 2023-01-01 RX ADMIN — CHLORHEXIDINE GLUCONATE 15 MILLILITER(S): 213 SOLUTION TOPICAL at 06:59

## 2023-01-01 RX ADMIN — Medication 4: at 12:20

## 2023-01-01 RX ADMIN — DROXIDOPA 600 MILLIGRAM(S): 100 CAPSULE ORAL at 17:00

## 2023-01-01 RX ADMIN — Medication 0.5 MILLIGRAM(S): at 09:18

## 2023-01-01 RX ADMIN — Medication 650 MILLIGRAM(S): at 17:36

## 2023-01-01 RX ADMIN — HEPARIN SODIUM 5000 UNIT(S): 5000 INJECTION INTRAVENOUS; SUBCUTANEOUS at 05:12

## 2023-01-01 RX ADMIN — SODIUM CHLORIDE 2 GRAM(S): 9 INJECTION INTRAMUSCULAR; INTRAVENOUS; SUBCUTANEOUS at 06:31

## 2023-01-01 RX ADMIN — MIDODRINE HYDROCHLORIDE 20 MILLIGRAM(S): 2.5 TABLET ORAL at 05:26

## 2023-01-01 RX ADMIN — Medication 1 APPLICATION(S): at 17:36

## 2023-01-01 RX ADMIN — Medication 300 MILLIGRAM(S): at 12:07

## 2023-01-01 RX ADMIN — Medication 0.3 MILLIGRAM(S): at 06:11

## 2023-01-01 RX ADMIN — SODIUM CHLORIDE 4 MILLILITER(S): 9 INJECTION INTRAMUSCULAR; INTRAVENOUS; SUBCUTANEOUS at 17:00

## 2023-01-01 RX ADMIN — Medication 3 MILLILITER(S): at 04:01

## 2023-01-01 RX ADMIN — Medication 2: at 18:33

## 2023-01-01 RX ADMIN — ALBUTEROL 2.5 MILLIGRAM(S): 90 AEROSOL, METERED ORAL at 09:31

## 2023-01-01 RX ADMIN — ATORVASTATIN CALCIUM 40 MILLIGRAM(S): 80 TABLET, FILM COATED ORAL at 21:16

## 2023-01-01 RX ADMIN — Medication 1 APPLICATION(S): at 17:26

## 2023-01-01 RX ADMIN — Medication 1 APPLICATION(S): at 23:43

## 2023-01-01 RX ADMIN — LEVETIRACETAM 250 MILLIGRAM(S): 250 TABLET, FILM COATED ORAL at 20:32

## 2023-01-01 RX ADMIN — Medication 6: at 17:22

## 2023-01-01 RX ADMIN — Medication 1 APPLICATION(S): at 11:53

## 2023-01-01 RX ADMIN — ALBUTEROL 2.5 MILLIGRAM(S): 90 AEROSOL, METERED ORAL at 22:59

## 2023-01-01 RX ADMIN — INSULIN GLARGINE 25 UNIT(S): 100 INJECTION, SOLUTION SUBCUTANEOUS at 22:57

## 2023-01-01 RX ADMIN — DIPHENHYDRAMINE HYDROCHLORIDE AND LIDOCAINE HYDROCHLORIDE AND ALUMINUM HYDROXIDE AND MAGNESIUM HYDRO 10 MILLILITER(S): KIT at 12:20

## 2023-01-01 RX ADMIN — Medication 30 MILLIGRAM(S): at 05:47

## 2023-01-01 RX ADMIN — Medication 1 DROP(S): at 06:19

## 2023-01-01 RX ADMIN — Medication 1 APPLICATION(S): at 00:53

## 2023-01-01 RX ADMIN — MIDODRINE HYDROCHLORIDE 40 MILLIGRAM(S): 2.5 TABLET ORAL at 13:56

## 2023-01-01 RX ADMIN — Medication 650 MILLIGRAM(S): at 13:00

## 2023-01-01 RX ADMIN — Medication 1 DROP(S): at 17:34

## 2023-01-01 RX ADMIN — HEPARIN SODIUM 5000 UNIT(S): 5000 INJECTION INTRAVENOUS; SUBCUTANEOUS at 05:21

## 2023-01-01 RX ADMIN — Medication 3 MILLILITER(S): at 20:29

## 2023-01-01 RX ADMIN — SODIUM CHLORIDE 50 MILLILITER(S): 5 INJECTION, SOLUTION INTRAVENOUS at 14:44

## 2023-01-01 RX ADMIN — ALBUTEROL 2.5 MILLIGRAM(S): 90 AEROSOL, METERED ORAL at 07:54

## 2023-01-01 RX ADMIN — SODIUM CHLORIDE 4 MILLILITER(S): 9 INJECTION INTRAMUSCULAR; INTRAVENOUS; SUBCUTANEOUS at 15:59

## 2023-01-01 RX ADMIN — Medication 0.1 MILLIGRAM(S): at 09:44

## 2023-01-01 RX ADMIN — HEPARIN SODIUM 5000 UNIT(S): 5000 INJECTION INTRAVENOUS; SUBCUTANEOUS at 16:58

## 2023-01-01 RX ADMIN — Medication 12: at 17:18

## 2023-01-01 RX ADMIN — MIDAZOLAM HYDROCHLORIDE 4 MILLIGRAM(S): 1 INJECTION, SOLUTION INTRAMUSCULAR; INTRAVENOUS at 19:45

## 2023-01-01 RX ADMIN — MUPIROCIN 1 APPLICATION(S): 20 OINTMENT TOPICAL at 05:41

## 2023-01-01 RX ADMIN — MIDODRINE HYDROCHLORIDE 40 MILLIGRAM(S): 2.5 TABLET ORAL at 23:07

## 2023-01-01 RX ADMIN — Medication 2: at 12:08

## 2023-01-01 RX ADMIN — FENTANYL CITRATE 3.75 MICROGRAM(S)/KG/HR: 50 INJECTION INTRAVENOUS at 09:17

## 2023-01-01 RX ADMIN — Medication 1: at 08:36

## 2023-01-01 RX ADMIN — MIDODRINE HYDROCHLORIDE 40 MILLIGRAM(S): 2.5 TABLET ORAL at 09:31

## 2023-01-01 RX ADMIN — SODIUM CHLORIDE 4 MILLILITER(S): 9 INJECTION INTRAMUSCULAR; INTRAVENOUS; SUBCUTANEOUS at 22:43

## 2023-01-01 RX ADMIN — Medication 3 MILLILITER(S): at 11:33

## 2023-01-01 RX ADMIN — Medication 1 DROP(S): at 14:06

## 2023-01-01 RX ADMIN — ATORVASTATIN CALCIUM 40 MILLIGRAM(S): 80 TABLET, FILM COATED ORAL at 23:27

## 2023-01-01 RX ADMIN — CHLORHEXIDINE GLUCONATE 15 MILLILITER(S): 213 SOLUTION TOPICAL at 05:18

## 2023-01-01 RX ADMIN — HEPARIN SODIUM 5000 UNIT(S): 5000 INJECTION INTRAVENOUS; SUBCUTANEOUS at 23:35

## 2023-01-01 RX ADMIN — CALAMINE AND ZINC OXIDE AND PHENOL 1 APPLICATION(S): 160; 10 LOTION TOPICAL at 11:39

## 2023-01-01 RX ADMIN — ATORVASTATIN CALCIUM 40 MILLIGRAM(S): 80 TABLET, FILM COATED ORAL at 21:05

## 2023-01-01 RX ADMIN — Medication 4: at 17:35

## 2023-01-01 RX ADMIN — Medication 1 APPLICATION(S): at 06:04

## 2023-01-01 RX ADMIN — DROXIDOPA 600 MILLIGRAM(S): 100 CAPSULE ORAL at 07:40

## 2023-01-01 RX ADMIN — MIDODRINE HYDROCHLORIDE 10 MILLIGRAM(S): 2.5 TABLET ORAL at 11:26

## 2023-01-01 RX ADMIN — Medication 2: at 05:39

## 2023-01-01 RX ADMIN — DEXMEDETOMIDINE HYDROCHLORIDE IN 0.9% SODIUM CHLORIDE 1.66 MICROGRAM(S)/KG/HR: 4 INJECTION INTRAVENOUS at 19:31

## 2023-01-01 RX ADMIN — ATORVASTATIN CALCIUM 10 MILLIGRAM(S): 80 TABLET, FILM COATED ORAL at 21:59

## 2023-01-01 RX ADMIN — Medication 10: at 17:08

## 2023-01-01 RX ADMIN — CHLORHEXIDINE GLUCONATE 1 APPLICATION(S): 213 SOLUTION TOPICAL at 05:21

## 2023-01-01 RX ADMIN — SODIUM CHLORIDE 4 MILLILITER(S): 9 INJECTION INTRAMUSCULAR; INTRAVENOUS; SUBCUTANEOUS at 09:56

## 2023-01-01 RX ADMIN — HUMAN INSULIN 8 UNIT(S): 100 INJECTION, SUSPENSION SUBCUTANEOUS at 00:53

## 2023-01-01 RX ADMIN — BUMETANIDE 5 MG/HR: 0.25 INJECTION INTRAMUSCULAR; INTRAVENOUS at 18:36

## 2023-01-01 RX ADMIN — SODIUM CHLORIDE 4 MILLILITER(S): 9 INJECTION INTRAMUSCULAR; INTRAVENOUS; SUBCUTANEOUS at 07:31

## 2023-01-01 RX ADMIN — Medication 0.5 MILLIGRAM(S): at 06:23

## 2023-01-01 RX ADMIN — CALAMINE AND ZINC OXIDE AND PHENOL 1 APPLICATION(S): 160; 10 LOTION TOPICAL at 12:45

## 2023-01-01 RX ADMIN — MEROPENEM 100 MILLIGRAM(S): 1 INJECTION INTRAVENOUS at 05:40

## 2023-01-01 RX ADMIN — CHLORHEXIDINE GLUCONATE 15 MILLILITER(S): 213 SOLUTION TOPICAL at 06:08

## 2023-01-01 RX ADMIN — ALBUTEROL 2.5 MILLIGRAM(S): 90 AEROSOL, METERED ORAL at 10:25

## 2023-01-01 RX ADMIN — PANTOPRAZOLE SODIUM 40 MILLIGRAM(S): 20 TABLET, DELAYED RELEASE ORAL at 06:44

## 2023-01-01 RX ADMIN — Medication 100 MILLIGRAM(S): at 10:12

## 2023-01-01 RX ADMIN — CISATRACURIUM BESYLATE 20 MILLIGRAM(S): 2 INJECTION INTRAVENOUS at 19:59

## 2023-01-01 RX ADMIN — MIDODRINE HYDROCHLORIDE 40 MILLIGRAM(S): 2.5 TABLET ORAL at 05:27

## 2023-01-01 RX ADMIN — DROXIDOPA 600 MILLIGRAM(S): 100 CAPSULE ORAL at 17:33

## 2023-01-01 RX ADMIN — SODIUM CHLORIDE 4 MILLILITER(S): 9 INJECTION INTRAMUSCULAR; INTRAVENOUS; SUBCUTANEOUS at 21:15

## 2023-01-01 RX ADMIN — Medication 1: at 18:05

## 2023-01-01 RX ADMIN — Medication 1 SPRAY(S): at 17:20

## 2023-01-01 RX ADMIN — MIDODRINE HYDROCHLORIDE 10 MILLIGRAM(S): 2.5 TABLET ORAL at 05:41

## 2023-01-01 RX ADMIN — Medication 2: at 06:44

## 2023-01-01 RX ADMIN — MIDODRINE HYDROCHLORIDE 30 MILLIGRAM(S): 2.5 TABLET ORAL at 05:15

## 2023-01-01 RX ADMIN — MEROPENEM 100 MILLIGRAM(S): 1 INJECTION INTRAVENOUS at 12:20

## 2023-01-01 RX ADMIN — Medication 81 MILLIGRAM(S): at 12:20

## 2023-01-01 RX ADMIN — SODIUM CHLORIDE 4 MILLILITER(S): 9 INJECTION INTRAMUSCULAR; INTRAVENOUS; SUBCUTANEOUS at 10:12

## 2023-01-01 RX ADMIN — POLYMYXIN B SULFATE 250 UNIT(S): 500000 INJECTION, POWDER, LYOPHILIZED, FOR SOLUTION INTRAMUSCULAR; INTRATHECAL; INTRAVENOUS; OPHTHALMIC at 05:17

## 2023-01-01 RX ADMIN — ALBUTEROL 2.5 MILLIGRAM(S): 90 AEROSOL, METERED ORAL at 11:09

## 2023-01-01 RX ADMIN — Medication 650 MILLIGRAM(S): at 00:13

## 2023-01-01 RX ADMIN — Medication 2: at 17:38

## 2023-01-01 RX ADMIN — SODIUM CHLORIDE 2 GRAM(S): 9 INJECTION INTRAMUSCULAR; INTRAVENOUS; SUBCUTANEOUS at 17:15

## 2023-01-01 RX ADMIN — Medication 2: at 05:53

## 2023-01-01 RX ADMIN — HUMAN INSULIN 6 UNIT(S): 100 INJECTION, SUSPENSION SUBCUTANEOUS at 17:20

## 2023-01-01 RX ADMIN — HUMAN INSULIN 11 UNIT(S): 100 INJECTION, SUSPENSION SUBCUTANEOUS at 12:08

## 2023-01-01 RX ADMIN — Medication 3 MILLILITER(S): at 16:42

## 2023-01-01 RX ADMIN — HYDROMORPHONE HYDROCHLORIDE 0.5 MILLIGRAM(S): 2 INJECTION INTRAMUSCULAR; INTRAVENOUS; SUBCUTANEOUS at 02:16

## 2023-01-01 RX ADMIN — ATORVASTATIN CALCIUM 40 MILLIGRAM(S): 80 TABLET, FILM COATED ORAL at 22:00

## 2023-01-01 RX ADMIN — Medication 8 MILLIGRAM(S): at 21:57

## 2023-01-01 RX ADMIN — LEVETIRACETAM 1000 MILLIGRAM(S): 250 TABLET, FILM COATED ORAL at 11:43

## 2023-01-01 RX ADMIN — HEPARIN SODIUM 1200 UNIT(S)/HR: 5000 INJECTION INTRAVENOUS; SUBCUTANEOUS at 06:00

## 2023-01-01 RX ADMIN — DROXIDOPA 600 MILLIGRAM(S): 100 CAPSULE ORAL at 17:09

## 2023-01-01 RX ADMIN — MEROPENEM 100 MILLIGRAM(S): 1 INJECTION INTRAVENOUS at 21:27

## 2023-01-01 RX ADMIN — DROXIDOPA 600 MILLIGRAM(S): 100 CAPSULE ORAL at 02:01

## 2023-01-01 RX ADMIN — CHLORHEXIDINE GLUCONATE 15 MILLILITER(S): 213 SOLUTION TOPICAL at 05:19

## 2023-01-01 RX ADMIN — Medication 1 DROP(S): at 07:59

## 2023-01-01 RX ADMIN — CHLORHEXIDINE GLUCONATE 1 APPLICATION(S): 213 SOLUTION TOPICAL at 05:31

## 2023-01-01 RX ADMIN — Medication 650 MILLIGRAM(S): at 10:05

## 2023-01-01 RX ADMIN — VALACYCLOVIR 500 MILLIGRAM(S): 500 TABLET, FILM COATED ORAL at 13:02

## 2023-01-01 RX ADMIN — LEVETIRACETAM 1000 MILLIGRAM(S): 250 TABLET, FILM COATED ORAL at 12:37

## 2023-01-01 RX ADMIN — DROXIDOPA 100 MILLIGRAM(S): 100 CAPSULE ORAL at 17:08

## 2023-01-01 RX ADMIN — SODIUM CHLORIDE 4 MILLILITER(S): 9 INJECTION INTRAMUSCULAR; INTRAVENOUS; SUBCUTANEOUS at 11:43

## 2023-01-01 RX ADMIN — CALAMINE AND ZINC OXIDE AND PHENOL 1 APPLICATION(S): 160; 10 LOTION TOPICAL at 13:12

## 2023-01-01 RX ADMIN — SODIUM CHLORIDE 1 GRAM(S): 9 INJECTION INTRAMUSCULAR; INTRAVENOUS; SUBCUTANEOUS at 18:05

## 2023-01-01 RX ADMIN — Medication 4: at 06:45

## 2023-01-01 RX ADMIN — Medication 1 DROP(S): at 05:21

## 2023-01-01 RX ADMIN — Medication 3 MILLILITER(S): at 03:32

## 2023-01-01 RX ADMIN — Medication 1 APPLICATION(S): at 12:40

## 2023-01-01 RX ADMIN — Medication 300 MILLIGRAM(S): at 11:19

## 2023-01-01 RX ADMIN — Medication 1 APPLICATION(S): at 23:50

## 2023-01-01 RX ADMIN — HUMAN INSULIN 6 UNIT(S): 100 INJECTION, SUSPENSION SUBCUTANEOUS at 00:07

## 2023-01-01 RX ADMIN — CHLORHEXIDINE GLUCONATE 15 MILLILITER(S): 213 SOLUTION TOPICAL at 06:05

## 2023-01-01 RX ADMIN — Medication 3 MILLILITER(S): at 06:28

## 2023-01-01 RX ADMIN — CHLORHEXIDINE GLUCONATE 15 MILLILITER(S): 213 SOLUTION TOPICAL at 18:29

## 2023-01-01 RX ADMIN — DROXIDOPA 600 MILLIGRAM(S): 100 CAPSULE ORAL at 11:42

## 2023-01-01 RX ADMIN — BUMETANIDE 10 MG/HR: 0.25 INJECTION INTRAMUSCULAR; INTRAVENOUS at 22:06

## 2023-01-01 RX ADMIN — Medication 1 DROP(S): at 18:49

## 2023-01-01 RX ADMIN — SODIUM CHLORIDE 4 MILLILITER(S): 9 INJECTION INTRAMUSCULAR; INTRAVENOUS; SUBCUTANEOUS at 03:58

## 2023-01-01 RX ADMIN — ALBUTEROL 2.5 MILLIGRAM(S): 90 AEROSOL, METERED ORAL at 15:57

## 2023-01-01 RX ADMIN — Medication 3 MILLILITER(S): at 21:58

## 2023-01-01 RX ADMIN — Medication 1 APPLICATION(S): at 18:06

## 2023-01-01 RX ADMIN — Medication 1 DROP(S): at 14:26

## 2023-01-01 RX ADMIN — Medication 6.23 MICROGRAM(S)/KG/MIN: at 08:13

## 2023-01-01 RX ADMIN — DEXMEDETOMIDINE HYDROCHLORIDE IN 0.9% SODIUM CHLORIDE 16.6 MICROGRAM(S)/KG/HR: 4 INJECTION INTRAVENOUS at 22:18

## 2023-01-01 RX ADMIN — PANTOPRAZOLE SODIUM 40 MILLIGRAM(S): 20 TABLET, DELAYED RELEASE ORAL at 17:51

## 2023-01-01 RX ADMIN — BUMETANIDE 20 MG/HR: 0.25 INJECTION INTRAMUSCULAR; INTRAVENOUS at 22:19

## 2023-01-01 RX ADMIN — FENTANYL CITRATE 50 MICROGRAM(S): 50 INJECTION INTRAVENOUS at 14:40

## 2023-01-01 RX ADMIN — Medication 650 MILLIGRAM(S): at 22:21

## 2023-01-01 RX ADMIN — Medication 1 DROP(S): at 05:18

## 2023-01-01 RX ADMIN — Medication 100 MILLIGRAM(S): at 18:21

## 2023-01-01 RX ADMIN — ERYTHROPOIETIN 10000 UNIT(S): 10000 INJECTION, SOLUTION INTRAVENOUS; SUBCUTANEOUS at 10:25

## 2023-01-01 RX ADMIN — CHLORHEXIDINE GLUCONATE 1 APPLICATION(S): 213 SOLUTION TOPICAL at 19:08

## 2023-01-01 RX ADMIN — ALBUTEROL 2.5 MILLIGRAM(S): 90 AEROSOL, METERED ORAL at 16:02

## 2023-01-01 RX ADMIN — ALBUTEROL 2.5 MILLIGRAM(S): 90 AEROSOL, METERED ORAL at 03:18

## 2023-01-01 RX ADMIN — Medication 650 MILLIGRAM(S): at 21:30

## 2023-01-01 RX ADMIN — Medication 2000 UNIT(S): at 10:33

## 2023-01-01 RX ADMIN — Medication 3 MILLILITER(S): at 03:30

## 2023-01-01 RX ADMIN — Medication 1 APPLICATION(S): at 06:43

## 2023-01-01 RX ADMIN — MUPIROCIN 1 APPLICATION(S): 20 OINTMENT TOPICAL at 20:27

## 2023-01-01 RX ADMIN — Medication 30 MILLIGRAM(S): at 05:37

## 2023-01-01 RX ADMIN — SODIUM CHLORIDE 1 GRAM(S): 9 INJECTION INTRAMUSCULAR; INTRAVENOUS; SUBCUTANEOUS at 05:14

## 2023-01-01 RX ADMIN — APIXABAN 5 MILLIGRAM(S): 2.5 TABLET, FILM COATED ORAL at 17:44

## 2023-01-01 RX ADMIN — MIDODRINE HYDROCHLORIDE 40 MILLIGRAM(S): 2.5 TABLET ORAL at 23:04

## 2023-01-01 RX ADMIN — BUMETANIDE 132 MILLIGRAM(S): 0.25 INJECTION INTRAMUSCULAR; INTRAVENOUS at 20:54

## 2023-01-01 RX ADMIN — HUMAN INSULIN 4 UNIT(S): 100 INJECTION, SUSPENSION SUBCUTANEOUS at 11:21

## 2023-01-01 RX ADMIN — Medication 50 MILLIGRAM(S): at 17:41

## 2023-01-01 RX ADMIN — FLUCONAZOLE 100 MILLIGRAM(S): 150 TABLET ORAL at 19:41

## 2023-01-01 RX ADMIN — CHLORHEXIDINE GLUCONATE 15 MILLILITER(S): 213 SOLUTION TOPICAL at 17:23

## 2023-01-01 RX ADMIN — HEPARIN SODIUM 10.5 UNIT(S)/HR: 5000 INJECTION INTRAVENOUS; SUBCUTANEOUS at 14:44

## 2023-01-01 RX ADMIN — ATORVASTATIN CALCIUM 10 MILLIGRAM(S): 80 TABLET, FILM COATED ORAL at 21:22

## 2023-01-01 RX ADMIN — PANTOPRAZOLE SODIUM 40 MILLIGRAM(S): 20 TABLET, DELAYED RELEASE ORAL at 17:46

## 2023-01-01 RX ADMIN — APIXABAN 5 MILLIGRAM(S): 2.5 TABLET, FILM COATED ORAL at 05:37

## 2023-01-01 RX ADMIN — CHLORHEXIDINE GLUCONATE 15 MILLILITER(S): 213 SOLUTION TOPICAL at 05:01

## 2023-01-01 RX ADMIN — INSULIN GLARGINE 18 UNIT(S): 100 INJECTION, SOLUTION SUBCUTANEOUS at 21:37

## 2023-01-01 RX ADMIN — Medication 100 MILLIGRAM(S): at 18:20

## 2023-01-01 RX ADMIN — DIPHENHYDRAMINE HYDROCHLORIDE AND LIDOCAINE HYDROCHLORIDE AND ALUMINUM HYDROXIDE AND MAGNESIUM HYDRO 10 MILLILITER(S): KIT at 18:29

## 2023-01-01 RX ADMIN — CHLORHEXIDINE GLUCONATE 15 MILLILITER(S): 213 SOLUTION TOPICAL at 06:27

## 2023-01-01 RX ADMIN — Medication 3 MILLILITER(S): at 22:08

## 2023-01-01 RX ADMIN — Medication 3 MILLILITER(S): at 10:36

## 2023-01-01 RX ADMIN — SODIUM CHLORIDE 4 MILLILITER(S): 9 INJECTION INTRAMUSCULAR; INTRAVENOUS; SUBCUTANEOUS at 09:46

## 2023-01-01 RX ADMIN — DROXIDOPA 600 MILLIGRAM(S): 100 CAPSULE ORAL at 15:00

## 2023-01-01 RX ADMIN — Medication 1 APPLICATION(S): at 23:30

## 2023-01-01 RX ADMIN — Medication 1 APPLICATION(S): at 17:43

## 2023-01-01 RX ADMIN — HUMAN INSULIN 18 UNIT(S): 100 INJECTION, SUSPENSION SUBCUTANEOUS at 11:52

## 2023-01-01 RX ADMIN — Medication 325 MILLIGRAM(S): at 11:04

## 2023-01-01 RX ADMIN — Medication 2: at 05:10

## 2023-01-01 RX ADMIN — Medication 650 MILLIGRAM(S): at 06:00

## 2023-01-01 RX ADMIN — Medication 250 MILLIGRAM(S): at 12:53

## 2023-01-01 RX ADMIN — HUMAN INSULIN 6 UNIT(S): 100 INJECTION, SUSPENSION SUBCUTANEOUS at 18:02

## 2023-01-01 RX ADMIN — Medication 4: at 17:28

## 2023-01-01 RX ADMIN — Medication 1 DROP(S): at 01:26

## 2023-01-01 RX ADMIN — Medication 1 DROP(S): at 17:29

## 2023-01-01 RX ADMIN — Medication 300 MILLIGRAM(S): at 11:17

## 2023-01-01 RX ADMIN — Medication 600 MILLIGRAM(S): at 08:14

## 2023-01-01 RX ADMIN — SODIUM CHLORIDE 1 GRAM(S): 9 INJECTION INTRAMUSCULAR; INTRAVENOUS; SUBCUTANEOUS at 05:37

## 2023-01-01 RX ADMIN — HUMAN INSULIN 6 UNIT(S): 100 INJECTION, SUSPENSION SUBCUTANEOUS at 00:19

## 2023-01-01 RX ADMIN — HEPARIN SODIUM 9.5 UNIT(S)/HR: 5000 INJECTION INTRAVENOUS; SUBCUTANEOUS at 19:16

## 2023-01-01 RX ADMIN — ALBUTEROL 2.5 MILLIGRAM(S): 90 AEROSOL, METERED ORAL at 03:17

## 2023-01-01 RX ADMIN — APIXABAN 5 MILLIGRAM(S): 2.5 TABLET, FILM COATED ORAL at 06:53

## 2023-01-01 RX ADMIN — Medication 1 APPLICATION(S): at 05:09

## 2023-01-01 RX ADMIN — Medication 2: at 18:10

## 2023-01-01 RX ADMIN — Medication 1 DROP(S): at 17:00

## 2023-01-01 RX ADMIN — SEVELAMER CARBONATE 1600 MILLIGRAM(S): 2400 POWDER, FOR SUSPENSION ORAL at 22:30

## 2023-01-01 RX ADMIN — ALBUTEROL 2.5 MILLIGRAM(S): 90 AEROSOL, METERED ORAL at 16:03

## 2023-01-01 RX ADMIN — Medication 1 APPLICATION(S): at 06:01

## 2023-01-01 RX ADMIN — ATORVASTATIN CALCIUM 10 MILLIGRAM(S): 80 TABLET, FILM COATED ORAL at 21:44

## 2023-01-01 RX ADMIN — FENTANYL CITRATE 100 MICROGRAM(S): 50 INJECTION INTRAVENOUS at 11:34

## 2023-01-01 RX ADMIN — LEVETIRACETAM 1000 MILLIGRAM(S): 250 TABLET, FILM COATED ORAL at 11:16

## 2023-01-01 RX ADMIN — HEPARIN SODIUM 5000 UNIT(S): 5000 INJECTION INTRAVENOUS; SUBCUTANEOUS at 22:21

## 2023-01-01 RX ADMIN — ATORVASTATIN CALCIUM 10 MILLIGRAM(S): 80 TABLET, FILM COATED ORAL at 21:13

## 2023-01-01 RX ADMIN — BUMETANIDE 2 MILLIGRAM(S): 0.25 INJECTION INTRAMUSCULAR; INTRAVENOUS at 11:16

## 2023-01-01 RX ADMIN — Medication 1 DROP(S): at 02:42

## 2023-01-01 RX ADMIN — FENTANYL CITRATE 50 MICROGRAM(S): 50 INJECTION INTRAVENOUS at 10:51

## 2023-01-01 RX ADMIN — MEROPENEM 100 MILLIGRAM(S): 1 INJECTION INTRAVENOUS at 22:31

## 2023-01-01 RX ADMIN — Medication 1 DROP(S): at 15:31

## 2023-01-01 RX ADMIN — Medication 6: at 05:56

## 2023-01-01 RX ADMIN — DEXMEDETOMIDINE HYDROCHLORIDE IN 0.9% SODIUM CHLORIDE 1.66 MICROGRAM(S)/KG/HR: 4 INJECTION INTRAVENOUS at 14:06

## 2023-01-01 RX ADMIN — HUMAN INSULIN 14 UNIT(S): 100 INJECTION, SUSPENSION SUBCUTANEOUS at 05:57

## 2023-01-01 RX ADMIN — Medication 8 UNIT(S): at 08:37

## 2023-01-01 RX ADMIN — Medication 1 APPLICATION(S): at 23:26

## 2023-01-01 RX ADMIN — Medication 6.23 MICROGRAM(S)/KG/MIN: at 12:29

## 2023-01-01 RX ADMIN — Medication 1 DROP(S): at 01:14

## 2023-01-01 RX ADMIN — SODIUM CHLORIDE 4 MILLILITER(S): 9 INJECTION INTRAMUSCULAR; INTRAVENOUS; SUBCUTANEOUS at 04:06

## 2023-01-01 RX ADMIN — MEROPENEM 100 MILLIGRAM(S): 1 INJECTION INTRAVENOUS at 05:38

## 2023-01-01 RX ADMIN — Medication 12.5 MILLIGRAM(S): at 21:33

## 2023-01-01 RX ADMIN — Medication 1 APPLICATION(S): at 17:14

## 2023-01-01 RX ADMIN — SODIUM CHLORIDE 4 MILLILITER(S): 9 INJECTION INTRAMUSCULAR; INTRAVENOUS; SUBCUTANEOUS at 22:56

## 2023-01-01 RX ADMIN — MIDODRINE HYDROCHLORIDE 40 MILLIGRAM(S): 2.5 TABLET ORAL at 17:55

## 2023-01-01 RX ADMIN — Medication 1 DROP(S): at 17:10

## 2023-01-01 RX ADMIN — HUMAN INSULIN 3 UNIT(S): 100 INJECTION, SUSPENSION SUBCUTANEOUS at 11:47

## 2023-01-01 RX ADMIN — HUMAN INSULIN 8 UNIT(S): 100 INJECTION, SUSPENSION SUBCUTANEOUS at 12:44

## 2023-01-01 RX ADMIN — Medication 1 DROP(S): at 21:59

## 2023-01-01 RX ADMIN — SODIUM CHLORIDE 3 MILLILITER(S): 9 INJECTION INTRAMUSCULAR; INTRAVENOUS; SUBCUTANEOUS at 11:04

## 2023-01-01 RX ADMIN — Medication 1 APPLICATION(S): at 12:02

## 2023-01-01 RX ADMIN — Medication 650 MILLIGRAM(S): at 23:40

## 2023-01-01 RX ADMIN — ERYTHROPOIETIN 10000 UNIT(S): 10000 INJECTION, SOLUTION INTRAVENOUS; SUBCUTANEOUS at 18:22

## 2023-01-01 RX ADMIN — ALBUTEROL 2.5 MILLIGRAM(S): 90 AEROSOL, METERED ORAL at 21:03

## 2023-01-01 RX ADMIN — DIPHENHYDRAMINE HYDROCHLORIDE AND LIDOCAINE HYDROCHLORIDE AND ALUMINUM HYDROXIDE AND MAGNESIUM HYDRO 10 MILLILITER(S): KIT at 23:51

## 2023-01-01 RX ADMIN — Medication 2: at 00:24

## 2023-01-01 RX ADMIN — SODIUM CHLORIDE 1 GRAM(S): 9 INJECTION INTRAMUSCULAR; INTRAVENOUS; SUBCUTANEOUS at 05:27

## 2023-01-01 RX ADMIN — Medication 25 MILLIGRAM(S): at 11:05

## 2023-01-01 RX ADMIN — Medication 1 DROP(S): at 06:42

## 2023-01-01 RX ADMIN — DROXIDOPA 600 MILLIGRAM(S): 100 CAPSULE ORAL at 01:41

## 2023-01-01 RX ADMIN — Medication 4 DROP(S): at 05:11

## 2023-01-01 RX ADMIN — Medication 0.5 MILLIGRAM(S): at 17:19

## 2023-01-01 RX ADMIN — PANTOPRAZOLE SODIUM 40 MILLIGRAM(S): 20 TABLET, DELAYED RELEASE ORAL at 17:36

## 2023-01-01 RX ADMIN — DEXMEDETOMIDINE HYDROCHLORIDE IN 0.9% SODIUM CHLORIDE 16.6 MICROGRAM(S)/KG/HR: 4 INJECTION INTRAVENOUS at 23:39

## 2023-01-01 RX ADMIN — HEPARIN SODIUM 5000 UNIT(S): 5000 INJECTION INTRAVENOUS; SUBCUTANEOUS at 23:02

## 2023-01-01 RX ADMIN — ALBUTEROL 2.5 MILLIGRAM(S): 90 AEROSOL, METERED ORAL at 20:08

## 2023-01-01 RX ADMIN — Medication 2: at 17:27

## 2023-01-01 RX ADMIN — MIDODRINE HYDROCHLORIDE 40 MILLIGRAM(S): 2.5 TABLET ORAL at 23:52

## 2023-01-01 RX ADMIN — LEVETIRACETAM 1000 MILLIGRAM(S): 250 TABLET, FILM COATED ORAL at 11:25

## 2023-01-01 RX ADMIN — Medication 1 DROP(S): at 11:10

## 2023-01-01 RX ADMIN — HUMAN INSULIN 6 UNIT(S): 100 INJECTION, SUSPENSION SUBCUTANEOUS at 06:19

## 2023-01-01 RX ADMIN — MIDAZOLAM HYDROCHLORIDE 4 MILLIGRAM(S): 1 INJECTION, SOLUTION INTRAMUSCULAR; INTRAVENOUS at 16:47

## 2023-01-01 RX ADMIN — Medication 1 DROP(S): at 17:38

## 2023-01-01 RX ADMIN — ALBUTEROL 2.5 MILLIGRAM(S): 90 AEROSOL, METERED ORAL at 10:57

## 2023-01-01 RX ADMIN — MEROPENEM 100 MILLIGRAM(S): 1 INJECTION INTRAVENOUS at 05:26

## 2023-01-01 RX ADMIN — HUMAN INSULIN 3 UNIT(S): 100 INJECTION, SUSPENSION SUBCUTANEOUS at 11:50

## 2023-01-01 RX ADMIN — Medication 6: at 01:29

## 2023-01-01 RX ADMIN — HUMAN INSULIN 4 UNIT(S): 100 INJECTION, SUSPENSION SUBCUTANEOUS at 05:34

## 2023-01-01 RX ADMIN — Medication 1 DROP(S): at 13:53

## 2023-01-01 RX ADMIN — SEVELAMER CARBONATE 1600 MILLIGRAM(S): 2400 POWDER, FOR SUSPENSION ORAL at 21:54

## 2023-01-01 RX ADMIN — SEVELAMER CARBONATE 800 MILLIGRAM(S): 2400 POWDER, FOR SUSPENSION ORAL at 14:02

## 2023-01-01 RX ADMIN — Medication 400 MILLIGRAM(S): at 02:00

## 2023-01-01 RX ADMIN — Medication 4: at 12:03

## 2023-01-01 RX ADMIN — Medication 1 DROP(S): at 11:11

## 2023-01-01 RX ADMIN — Medication 0.2 MILLIGRAM(S): at 00:24

## 2023-01-01 RX ADMIN — Medication 1 APPLICATION(S): at 06:25

## 2023-01-01 RX ADMIN — SEVELAMER CARBONATE 1600 MILLIGRAM(S): 2400 POWDER, FOR SUSPENSION ORAL at 21:21

## 2023-01-01 RX ADMIN — Medication 2: at 11:25

## 2023-01-01 RX ADMIN — ERYTHROPOIETIN 10000 UNIT(S): 10000 INJECTION, SOLUTION INTRAVENOUS; SUBCUTANEOUS at 15:08

## 2023-01-01 RX ADMIN — SODIUM CHLORIDE 4 MILLILITER(S): 9 INJECTION INTRAMUSCULAR; INTRAVENOUS; SUBCUTANEOUS at 09:29

## 2023-01-01 RX ADMIN — Medication 3 MILLILITER(S): at 15:17

## 2023-01-01 RX ADMIN — Medication 1 APPLICATION(S): at 06:45

## 2023-01-01 RX ADMIN — DROXIDOPA 600 MILLIGRAM(S): 100 CAPSULE ORAL at 10:36

## 2023-01-01 RX ADMIN — Medication 3 MILLILITER(S): at 22:09

## 2023-01-01 RX ADMIN — Medication 1 APPLICATION(S): at 05:42

## 2023-01-01 RX ADMIN — Medication 4: at 12:19

## 2023-01-01 RX ADMIN — HUMAN INSULIN 12 UNIT(S): 100 INJECTION, SUSPENSION SUBCUTANEOUS at 11:53

## 2023-01-01 RX ADMIN — SODIUM CHLORIDE 50 MILLILITER(S): 5 INJECTION, SOLUTION INTRAVENOUS at 14:40

## 2023-01-01 RX ADMIN — Medication 2: at 17:21

## 2023-01-01 RX ADMIN — HUMAN INSULIN 5 UNIT(S): 100 INJECTION, SUSPENSION SUBCUTANEOUS at 05:32

## 2023-01-01 RX ADMIN — CHLORHEXIDINE GLUCONATE 15 MILLILITER(S): 213 SOLUTION TOPICAL at 18:46

## 2023-01-01 RX ADMIN — Medication 81 MILLIGRAM(S): at 11:20

## 2023-01-01 RX ADMIN — MIDODRINE HYDROCHLORIDE 40 MILLIGRAM(S): 2.5 TABLET ORAL at 11:18

## 2023-01-01 RX ADMIN — Medication 1 DROP(S): at 05:36

## 2023-01-01 RX ADMIN — HUMAN INSULIN 1 UNIT(S): 100 INJECTION, SUSPENSION SUBCUTANEOUS at 06:32

## 2023-01-01 RX ADMIN — VALACYCLOVIR 500 MILLIGRAM(S): 500 TABLET, FILM COATED ORAL at 13:49

## 2023-01-01 RX ADMIN — SODIUM CHLORIDE 4 MILLILITER(S): 9 INJECTION INTRAMUSCULAR; INTRAVENOUS; SUBCUTANEOUS at 21:44

## 2023-01-01 RX ADMIN — HUMAN INSULIN 3 UNIT(S): 100 INJECTION, SUSPENSION SUBCUTANEOUS at 05:45

## 2023-01-01 RX ADMIN — Medication 3 MILLILITER(S): at 22:55

## 2023-01-01 RX ADMIN — Medication 150 MILLIGRAM(S): at 17:06

## 2023-01-01 RX ADMIN — CHLORHEXIDINE GLUCONATE 1 APPLICATION(S): 213 SOLUTION TOPICAL at 05:51

## 2023-01-01 RX ADMIN — PANTOPRAZOLE SODIUM 40 MILLIGRAM(S): 20 TABLET, DELAYED RELEASE ORAL at 05:17

## 2023-01-01 RX ADMIN — HUMAN INSULIN 11 UNIT(S): 100 INJECTION, SUSPENSION SUBCUTANEOUS at 05:38

## 2023-01-01 RX ADMIN — NICARDIPINE HYDROCHLORIDE 25 MG/HR: 30 CAPSULE, EXTENDED RELEASE ORAL at 14:25

## 2023-01-01 RX ADMIN — Medication 650 MILLIGRAM(S): at 23:08

## 2023-01-01 RX ADMIN — Medication 1 DROP(S): at 13:24

## 2023-01-01 RX ADMIN — CHLORHEXIDINE GLUCONATE 15 MILLILITER(S): 213 SOLUTION TOPICAL at 05:20

## 2023-01-01 RX ADMIN — HUMAN INSULIN 6 UNIT(S): 100 INJECTION, SUSPENSION SUBCUTANEOUS at 23:28

## 2023-01-01 RX ADMIN — Medication 1 APPLICATION(S): at 17:18

## 2023-01-01 RX ADMIN — Medication 3 MILLIGRAM(S): at 22:52

## 2023-01-01 RX ADMIN — HUMAN INSULIN 4 UNIT(S): 100 INJECTION, SUSPENSION SUBCUTANEOUS at 18:15

## 2023-01-01 RX ADMIN — MIDODRINE HYDROCHLORIDE 40 MILLIGRAM(S): 2.5 TABLET ORAL at 11:21

## 2023-01-01 RX ADMIN — DIPHENHYDRAMINE HYDROCHLORIDE AND LIDOCAINE HYDROCHLORIDE AND ALUMINUM HYDROXIDE AND MAGNESIUM HYDRO 10 MILLILITER(S): KIT at 18:25

## 2023-01-01 RX ADMIN — DEXMEDETOMIDINE HYDROCHLORIDE IN 0.9% SODIUM CHLORIDE 16.6 MICROGRAM(S)/KG/HR: 4 INJECTION INTRAVENOUS at 19:31

## 2023-01-01 RX ADMIN — CHLORHEXIDINE GLUCONATE 1 APPLICATION(S): 213 SOLUTION TOPICAL at 06:25

## 2023-01-01 RX ADMIN — MIDODRINE HYDROCHLORIDE 40 MILLIGRAM(S): 2.5 TABLET ORAL at 18:18

## 2023-01-01 RX ADMIN — CHLORHEXIDINE GLUCONATE 15 MILLILITER(S): 213 SOLUTION TOPICAL at 06:18

## 2023-01-01 RX ADMIN — Medication 4: at 18:23

## 2023-01-01 RX ADMIN — ATORVASTATIN CALCIUM 40 MILLIGRAM(S): 80 TABLET, FILM COATED ORAL at 21:33

## 2023-01-01 RX ADMIN — Medication 30 MILLIGRAM(S): at 06:19

## 2023-01-01 RX ADMIN — Medication 300 MILLIGRAM(S): at 12:15

## 2023-01-01 RX ADMIN — Medication 1 APPLICATION(S): at 06:29

## 2023-01-01 RX ADMIN — Medication 1 APPLICATION(S): at 05:58

## 2023-01-01 RX ADMIN — Medication 3 MILLILITER(S): at 12:59

## 2023-01-01 RX ADMIN — Medication 2000 UNIT(S): at 13:18

## 2023-01-01 RX ADMIN — Medication 0.1 MILLIGRAM(S): at 18:44

## 2023-01-01 RX ADMIN — PANTOPRAZOLE SODIUM 40 MILLIGRAM(S): 20 TABLET, DELAYED RELEASE ORAL at 18:22

## 2023-01-01 RX ADMIN — CHLORHEXIDINE GLUCONATE 15 MILLILITER(S): 213 SOLUTION TOPICAL at 05:43

## 2023-01-01 RX ADMIN — Medication 1000 MILLIGRAM(S): at 04:48

## 2023-01-01 RX ADMIN — DROXIDOPA 600 MILLIGRAM(S): 100 CAPSULE ORAL at 11:08

## 2023-01-01 RX ADMIN — HUMAN INSULIN 2 UNIT(S): 100 INJECTION, SUSPENSION SUBCUTANEOUS at 06:31

## 2023-01-01 RX ADMIN — Medication 325 MILLIGRAM(S): at 13:18

## 2023-01-01 RX ADMIN — Medication 1 DROP(S): at 10:19

## 2023-01-01 RX ADMIN — Medication 3 MILLILITER(S): at 04:00

## 2023-01-01 RX ADMIN — Medication 2: at 17:44

## 2023-01-01 RX ADMIN — Medication 1 DROP(S): at 06:55

## 2023-01-01 RX ADMIN — Medication 4 DROP(S): at 18:30

## 2023-01-01 RX ADMIN — Medication 2: at 12:30

## 2023-01-01 RX ADMIN — Medication 81 MILLIGRAM(S): at 11:11

## 2023-01-01 RX ADMIN — MIDODRINE HYDROCHLORIDE 30 MILLIGRAM(S): 2.5 TABLET ORAL at 21:16

## 2023-01-01 RX ADMIN — Medication 3 MILLILITER(S): at 09:52

## 2023-01-01 RX ADMIN — SODIUM CHLORIDE 4 MILLILITER(S): 9 INJECTION INTRAMUSCULAR; INTRAVENOUS; SUBCUTANEOUS at 03:35

## 2023-01-01 RX ADMIN — Medication 1 DROP(S): at 17:39

## 2023-01-01 RX ADMIN — CALAMINE AND ZINC OXIDE AND PHENOL 1 APPLICATION(S): 160; 10 LOTION TOPICAL at 17:21

## 2023-01-01 RX ADMIN — DROXIDOPA 600 MILLIGRAM(S): 100 CAPSULE ORAL at 22:52

## 2023-01-01 RX ADMIN — FENTANYL CITRATE 50 MICROGRAM(S): 50 INJECTION INTRAVENOUS at 11:00

## 2023-01-01 RX ADMIN — ERYTHROPOIETIN 10000 UNIT(S): 10000 INJECTION, SOLUTION INTRAVENOUS; SUBCUTANEOUS at 11:32

## 2023-01-01 RX ADMIN — Medication 1 APPLICATION(S): at 18:40

## 2023-01-01 RX ADMIN — PIPERACILLIN AND TAZOBACTAM 25 GRAM(S): 4; .5 INJECTION, POWDER, LYOPHILIZED, FOR SOLUTION INTRAVENOUS at 05:25

## 2023-01-01 RX ADMIN — Medication 5 MILLIGRAM(S): at 00:28

## 2023-01-01 RX ADMIN — Medication 0.1 MILLIGRAM(S): at 10:55

## 2023-01-01 RX ADMIN — Medication 25 GRAM(S): at 23:42

## 2023-01-01 RX ADMIN — Medication 1 APPLICATION(S): at 22:48

## 2023-01-01 RX ADMIN — ALBUTEROL 2.5 MILLIGRAM(S): 90 AEROSOL, METERED ORAL at 16:42

## 2023-01-01 RX ADMIN — SODIUM CHLORIDE 4 MILLILITER(S): 9 INJECTION INTRAMUSCULAR; INTRAVENOUS; SUBCUTANEOUS at 19:51

## 2023-01-01 RX ADMIN — Medication 2: at 11:53

## 2023-01-01 RX ADMIN — Medication 4: at 05:16

## 2023-01-01 RX ADMIN — PANTOPRAZOLE SODIUM 40 MILLIGRAM(S): 20 TABLET, DELAYED RELEASE ORAL at 05:16

## 2023-01-01 RX ADMIN — Medication 3 MILLILITER(S): at 17:30

## 2023-01-01 RX ADMIN — Medication 300 MILLIGRAM(S): at 12:18

## 2023-01-01 RX ADMIN — DROXIDOPA 600 MILLIGRAM(S): 100 CAPSULE ORAL at 01:26

## 2023-01-01 RX ADMIN — SEVELAMER CARBONATE 1600 MILLIGRAM(S): 2400 POWDER, FOR SUSPENSION ORAL at 14:16

## 2023-01-01 RX ADMIN — PANTOPRAZOLE SODIUM 40 MILLIGRAM(S): 20 TABLET, DELAYED RELEASE ORAL at 17:59

## 2023-01-01 RX ADMIN — CHLORHEXIDINE GLUCONATE 15 MILLILITER(S): 213 SOLUTION TOPICAL at 17:44

## 2023-01-01 RX ADMIN — SODIUM CHLORIDE 4 MILLILITER(S): 9 INJECTION INTRAMUSCULAR; INTRAVENOUS; SUBCUTANEOUS at 15:52

## 2023-01-01 RX ADMIN — SODIUM CHLORIDE 4 MILLILITER(S): 9 INJECTION INTRAMUSCULAR; INTRAVENOUS; SUBCUTANEOUS at 09:24

## 2023-01-01 RX ADMIN — HUMAN INSULIN 6 UNIT(S): 100 INJECTION, SUSPENSION SUBCUTANEOUS at 05:23

## 2023-01-01 RX ADMIN — Medication 300 MILLIGRAM(S): at 11:47

## 2023-01-01 RX ADMIN — Medication 400 MILLIGRAM(S): at 15:31

## 2023-01-01 RX ADMIN — Medication 1 DROP(S): at 19:44

## 2023-01-01 RX ADMIN — CALAMINE AND ZINC OXIDE AND PHENOL 1 APPLICATION(S): 160; 10 LOTION TOPICAL at 12:19

## 2023-01-01 RX ADMIN — SODIUM CHLORIDE 4 MILLILITER(S): 9 INJECTION INTRAMUSCULAR; INTRAVENOUS; SUBCUTANEOUS at 21:53

## 2023-01-01 RX ADMIN — SODIUM CHLORIDE 4 MILLILITER(S): 9 INJECTION INTRAMUSCULAR; INTRAVENOUS; SUBCUTANEOUS at 10:08

## 2023-01-01 RX ADMIN — Medication 1 APPLICATION(S): at 19:36

## 2023-01-01 RX ADMIN — PANTOPRAZOLE SODIUM 40 MILLIGRAM(S): 20 TABLET, DELAYED RELEASE ORAL at 17:54

## 2023-01-01 RX ADMIN — SEVELAMER CARBONATE 1600 MILLIGRAM(S): 2400 POWDER, FOR SUSPENSION ORAL at 05:28

## 2023-01-01 RX ADMIN — DIPHENHYDRAMINE HYDROCHLORIDE AND LIDOCAINE HYDROCHLORIDE AND ALUMINUM HYDROXIDE AND MAGNESIUM HYDRO 10 MILLILITER(S): KIT at 23:29

## 2023-01-01 RX ADMIN — HUMAN INSULIN 3 UNIT(S): 100 INJECTION, SUSPENSION SUBCUTANEOUS at 17:19

## 2023-01-01 RX ADMIN — ATORVASTATIN CALCIUM 40 MILLIGRAM(S): 80 TABLET, FILM COATED ORAL at 21:04

## 2023-01-01 RX ADMIN — LETROZOLE 2.5 MILLIGRAM(S): 2.5 TABLET, FILM COATED ORAL at 12:01

## 2023-01-01 RX ADMIN — Medication 4: at 23:57

## 2023-01-01 RX ADMIN — Medication 6: at 13:15

## 2023-01-01 RX ADMIN — HUMAN INSULIN 3 UNIT(S): 100 INJECTION, SUSPENSION SUBCUTANEOUS at 17:57

## 2023-01-01 RX ADMIN — MIDODRINE HYDROCHLORIDE 10 MILLIGRAM(S): 2.5 TABLET ORAL at 13:37

## 2023-01-01 RX ADMIN — Medication 0.5 MILLIGRAM(S): at 17:31

## 2023-01-01 RX ADMIN — MIDODRINE HYDROCHLORIDE 10 MILLIGRAM(S): 2.5 TABLET ORAL at 05:35

## 2023-01-01 RX ADMIN — Medication 650 MILLIGRAM(S): at 14:30

## 2023-01-01 RX ADMIN — CEFEPIME 100 MILLIGRAM(S): 1 INJECTION, POWDER, FOR SOLUTION INTRAMUSCULAR; INTRAVENOUS at 13:07

## 2023-01-01 RX ADMIN — HUMAN INSULIN 3 UNIT(S): 100 INJECTION, SUSPENSION SUBCUTANEOUS at 12:20

## 2023-01-01 RX ADMIN — PANTOPRAZOLE SODIUM 40 MILLIGRAM(S): 20 TABLET, DELAYED RELEASE ORAL at 18:10

## 2023-01-01 RX ADMIN — HEPARIN SODIUM 5000 UNIT(S): 5000 INJECTION INTRAVENOUS; SUBCUTANEOUS at 21:57

## 2023-01-01 RX ADMIN — DESMOPRESSIN ACETATE 220 MICROGRAM(S): 0.1 TABLET ORAL at 14:45

## 2023-01-01 RX ADMIN — Medication 3 MILLILITER(S): at 19:51

## 2023-01-01 RX ADMIN — CARVEDILOL PHOSPHATE 37.5 MILLIGRAM(S): 80 CAPSULE, EXTENDED RELEASE ORAL at 06:23

## 2023-01-01 RX ADMIN — CHLORHEXIDINE GLUCONATE 15 MILLILITER(S): 213 SOLUTION TOPICAL at 05:32

## 2023-01-01 RX ADMIN — ERYTHROPOIETIN 10000 UNIT(S): 10000 INJECTION, SOLUTION INTRAVENOUS; SUBCUTANEOUS at 19:30

## 2023-01-01 RX ADMIN — Medication 650 MILLIGRAM(S): at 06:45

## 2023-01-01 RX ADMIN — ATORVASTATIN CALCIUM 10 MILLIGRAM(S): 80 TABLET, FILM COATED ORAL at 21:05

## 2023-01-01 RX ADMIN — CHLORHEXIDINE GLUCONATE 1 APPLICATION(S): 213 SOLUTION TOPICAL at 11:31

## 2023-01-01 RX ADMIN — HUMAN INSULIN 6 UNIT(S): 100 INJECTION, SUSPENSION SUBCUTANEOUS at 22:46

## 2023-01-01 RX ADMIN — HUMAN INSULIN 5 UNIT(S): 100 INJECTION, SUSPENSION SUBCUTANEOUS at 18:03

## 2023-01-01 RX ADMIN — Medication 2: at 05:24

## 2023-01-01 RX ADMIN — PANTOPRAZOLE SODIUM 40 MILLIGRAM(S): 20 TABLET, DELAYED RELEASE ORAL at 18:39

## 2023-01-01 RX ADMIN — Medication 1 DROP(S): at 22:44

## 2023-01-01 RX ADMIN — HUMAN INSULIN 3 UNIT(S): 100 INJECTION, SUSPENSION SUBCUTANEOUS at 05:35

## 2023-01-01 RX ADMIN — Medication 1 DROP(S): at 10:11

## 2023-01-01 RX ADMIN — Medication 1 DROP(S): at 14:18

## 2023-01-01 RX ADMIN — Medication 1 DROP(S): at 22:49

## 2023-01-01 RX ADMIN — HEPARIN SODIUM 5000 UNIT(S): 5000 INJECTION INTRAVENOUS; SUBCUTANEOUS at 21:33

## 2023-01-01 RX ADMIN — Medication 1 DROP(S): at 22:58

## 2023-01-01 RX ADMIN — Medication 1 DROP(S): at 06:54

## 2023-01-01 RX ADMIN — SODIUM CHLORIDE 4 MILLILITER(S): 9 INJECTION INTRAMUSCULAR; INTRAVENOUS; SUBCUTANEOUS at 22:17

## 2023-01-01 RX ADMIN — Medication 1 DROP(S): at 02:15

## 2023-01-01 RX ADMIN — Medication 1 APPLICATION(S): at 00:03

## 2023-01-01 RX ADMIN — Medication 100 MILLIGRAM(S): at 09:17

## 2023-01-01 RX ADMIN — CHLORHEXIDINE GLUCONATE 15 MILLILITER(S): 213 SOLUTION TOPICAL at 17:21

## 2023-01-01 RX ADMIN — SODIUM CHLORIDE 4 MILLILITER(S): 9 INJECTION INTRAMUSCULAR; INTRAVENOUS; SUBCUTANEOUS at 15:16

## 2023-01-01 RX ADMIN — APIXABAN 5 MILLIGRAM(S): 2.5 TABLET, FILM COATED ORAL at 17:57

## 2023-01-01 RX ADMIN — MIDODRINE HYDROCHLORIDE 40 MILLIGRAM(S): 2.5 TABLET ORAL at 05:37

## 2023-01-01 RX ADMIN — Medication 0.1 MILLIGRAM(S): at 17:22

## 2023-01-01 RX ADMIN — Medication 1 APPLICATION(S): at 06:34

## 2023-01-01 RX ADMIN — MIDODRINE HYDROCHLORIDE 30 MILLIGRAM(S): 2.5 TABLET ORAL at 09:43

## 2023-01-01 RX ADMIN — Medication 1: at 08:47

## 2023-01-01 RX ADMIN — Medication 500 MILLIGRAM(S): at 05:22

## 2023-01-01 RX ADMIN — CHLORHEXIDINE GLUCONATE 1 APPLICATION(S): 213 SOLUTION TOPICAL at 06:35

## 2023-01-01 RX ADMIN — SODIUM CHLORIDE 4 MILLILITER(S): 9 INJECTION INTRAMUSCULAR; INTRAVENOUS; SUBCUTANEOUS at 09:11

## 2023-01-01 RX ADMIN — Medication 3 MILLILITER(S): at 03:12

## 2023-01-01 RX ADMIN — Medication 3 MILLILITER(S): at 04:07

## 2023-01-01 RX ADMIN — CHLORHEXIDINE GLUCONATE 15 MILLILITER(S): 213 SOLUTION TOPICAL at 05:06

## 2023-01-01 RX ADMIN — Medication 1 DROP(S): at 05:34

## 2023-01-01 RX ADMIN — Medication 300 MILLIGRAM(S): at 12:13

## 2023-01-01 RX ADMIN — HEPARIN SODIUM 5000 UNIT(S): 5000 INJECTION INTRAVENOUS; SUBCUTANEOUS at 05:04

## 2023-01-01 RX ADMIN — SODIUM CHLORIDE 4 MILLILITER(S): 9 INJECTION INTRAMUSCULAR; INTRAVENOUS; SUBCUTANEOUS at 04:28

## 2023-01-01 RX ADMIN — Medication 650 MILLIGRAM(S): at 22:42

## 2023-01-01 RX ADMIN — MIDODRINE HYDROCHLORIDE 40 MILLIGRAM(S): 2.5 TABLET ORAL at 11:41

## 2023-01-01 RX ADMIN — Medication 6.23 MICROGRAM(S)/KG/MIN: at 11:13

## 2023-01-01 RX ADMIN — Medication 1 DROP(S): at 10:23

## 2023-01-01 RX ADMIN — ETOMIDATE 20 MILLIGRAM(S): 2 INJECTION INTRAVENOUS at 07:13

## 2023-01-01 RX ADMIN — Medication 1 DROP(S): at 10:40

## 2023-01-01 RX ADMIN — Medication 4: at 23:46

## 2023-01-01 RX ADMIN — Medication 1000 MILLIGRAM(S): at 23:38

## 2023-01-01 RX ADMIN — Medication 8: at 00:17

## 2023-01-01 RX ADMIN — Medication 3 MILLILITER(S): at 21:43

## 2023-01-01 RX ADMIN — MUPIROCIN 1 APPLICATION(S): 20 OINTMENT TOPICAL at 18:18

## 2023-01-01 RX ADMIN — HUMAN INSULIN 6 UNIT(S): 100 INJECTION, SUSPENSION SUBCUTANEOUS at 17:53

## 2023-01-01 RX ADMIN — Medication 300 MILLIGRAM(S): at 11:16

## 2023-01-01 RX ADMIN — CHLORHEXIDINE GLUCONATE 15 MILLILITER(S): 213 SOLUTION TOPICAL at 05:17

## 2023-01-01 RX ADMIN — ATORVASTATIN CALCIUM 10 MILLIGRAM(S): 80 TABLET, FILM COATED ORAL at 21:26

## 2023-01-01 RX ADMIN — Medication 81 MILLIGRAM(S): at 11:43

## 2023-01-01 RX ADMIN — SODIUM CHLORIDE 1 GRAM(S): 9 INJECTION INTRAMUSCULAR; INTRAVENOUS; SUBCUTANEOUS at 17:40

## 2023-01-01 RX ADMIN — CHLORHEXIDINE GLUCONATE 15 MILLILITER(S): 213 SOLUTION TOPICAL at 05:14

## 2023-01-01 RX ADMIN — APIXABAN 5 MILLIGRAM(S): 2.5 TABLET, FILM COATED ORAL at 06:27

## 2023-01-01 RX ADMIN — APIXABAN 5 MILLIGRAM(S): 2.5 TABLET, FILM COATED ORAL at 05:27

## 2023-01-01 RX ADMIN — HUMAN INSULIN 6 UNIT(S): 100 INJECTION, SUSPENSION SUBCUTANEOUS at 06:07

## 2023-01-01 RX ADMIN — Medication 3 MILLILITER(S): at 04:28

## 2023-01-01 RX ADMIN — Medication 3 MILLILITER(S): at 03:43

## 2023-01-01 RX ADMIN — CARVEDILOL PHOSPHATE 12.5 MILLIGRAM(S): 80 CAPSULE, EXTENDED RELEASE ORAL at 05:25

## 2023-01-01 RX ADMIN — DIPHENHYDRAMINE HYDROCHLORIDE AND LIDOCAINE HYDROCHLORIDE AND ALUMINUM HYDROXIDE AND MAGNESIUM HYDRO 10 MILLILITER(S): KIT at 23:43

## 2023-01-01 RX ADMIN — MIDODRINE HYDROCHLORIDE 40 MILLIGRAM(S): 2.5 TABLET ORAL at 05:35

## 2023-01-01 RX ADMIN — ATORVASTATIN CALCIUM 10 MILLIGRAM(S): 80 TABLET, FILM COATED ORAL at 21:16

## 2023-01-01 RX ADMIN — Medication 1 DROP(S): at 02:50

## 2023-01-01 RX ADMIN — MIDODRINE HYDROCHLORIDE 40 MILLIGRAM(S): 2.5 TABLET ORAL at 20:24

## 2023-01-01 RX ADMIN — Medication 1000 MILLIGRAM(S): at 09:06

## 2023-01-01 RX ADMIN — Medication 3 MILLILITER(S): at 04:03

## 2023-01-01 RX ADMIN — ALBUTEROL 2.5 MILLIGRAM(S): 90 AEROSOL, METERED ORAL at 16:34

## 2023-01-01 RX ADMIN — Medication 2: at 18:56

## 2023-01-01 RX ADMIN — SODIUM CHLORIDE 1 GRAM(S): 9 INJECTION INTRAMUSCULAR; INTRAVENOUS; SUBCUTANEOUS at 06:16

## 2023-01-01 RX ADMIN — CHLORHEXIDINE GLUCONATE 15 MILLILITER(S): 213 SOLUTION TOPICAL at 17:42

## 2023-01-01 RX ADMIN — Medication 2: at 06:41

## 2023-01-01 RX ADMIN — Medication 40 MILLIGRAM(S): at 15:57

## 2023-01-01 RX ADMIN — ATORVASTATIN CALCIUM 10 MILLIGRAM(S): 80 TABLET, FILM COATED ORAL at 23:03

## 2023-01-01 RX ADMIN — BUMETANIDE 2 MILLIGRAM(S): 0.25 INJECTION INTRAMUSCULAR; INTRAVENOUS at 11:51

## 2023-01-01 RX ADMIN — MIDODRINE HYDROCHLORIDE 40 MILLIGRAM(S): 2.5 TABLET ORAL at 17:29

## 2023-01-01 RX ADMIN — PANTOPRAZOLE SODIUM 40 MILLIGRAM(S): 20 TABLET, DELAYED RELEASE ORAL at 05:12

## 2023-01-01 RX ADMIN — HUMAN INSULIN 6 UNIT(S): 100 INJECTION, SUSPENSION SUBCUTANEOUS at 17:59

## 2023-01-01 RX ADMIN — Medication 2: at 05:48

## 2023-01-01 RX ADMIN — Medication 3 MILLILITER(S): at 10:22

## 2023-01-01 RX ADMIN — CHLORHEXIDINE GLUCONATE 15 MILLILITER(S): 213 SOLUTION TOPICAL at 05:30

## 2023-01-01 RX ADMIN — Medication 1 DROP(S): at 17:09

## 2023-01-01 RX ADMIN — HUMAN INSULIN 3 UNIT(S): 100 INJECTION, SUSPENSION SUBCUTANEOUS at 23:35

## 2023-01-01 RX ADMIN — POLYMYXIN B SULFATE 250 UNIT(S): 500000 INJECTION, POWDER, LYOPHILIZED, FOR SOLUTION INTRAMUSCULAR; INTRATHECAL; INTRAVENOUS; OPHTHALMIC at 05:14

## 2023-01-01 RX ADMIN — MIDODRINE HYDROCHLORIDE 20 MILLIGRAM(S): 2.5 TABLET ORAL at 17:17

## 2023-01-01 RX ADMIN — SODIUM CHLORIDE 2 GRAM(S): 9 INJECTION INTRAMUSCULAR; INTRAVENOUS; SUBCUTANEOUS at 18:05

## 2023-01-01 RX ADMIN — CARVEDILOL PHOSPHATE 37.5 MILLIGRAM(S): 80 CAPSULE, EXTENDED RELEASE ORAL at 17:32

## 2023-01-01 RX ADMIN — Medication 81 MILLIGRAM(S): at 11:07

## 2023-01-01 RX ADMIN — LEVETIRACETAM 250 MILLIGRAM(S): 250 TABLET, FILM COATED ORAL at 21:34

## 2023-01-01 RX ADMIN — Medication 1 APPLICATION(S): at 13:50

## 2023-01-01 RX ADMIN — Medication 250 MILLIGRAM(S): at 10:30

## 2023-01-01 RX ADMIN — BUMETANIDE 5 MG/HR: 0.25 INJECTION INTRAMUSCULAR; INTRAVENOUS at 20:43

## 2023-01-01 RX ADMIN — Medication 1 DROP(S): at 02:29

## 2023-01-01 RX ADMIN — Medication 4: at 18:13

## 2023-01-01 RX ADMIN — PANTOPRAZOLE SODIUM 40 MILLIGRAM(S): 20 TABLET, DELAYED RELEASE ORAL at 05:09

## 2023-01-01 RX ADMIN — Medication 3 MILLILITER(S): at 03:53

## 2023-01-01 RX ADMIN — Medication 1 DROP(S): at 04:54

## 2023-01-01 RX ADMIN — ATORVASTATIN CALCIUM 10 MILLIGRAM(S): 80 TABLET, FILM COATED ORAL at 21:29

## 2023-01-01 RX ADMIN — HUMAN INSULIN 6 UNIT(S): 100 INJECTION, SUSPENSION SUBCUTANEOUS at 18:03

## 2023-01-01 RX ADMIN — Medication 2: at 17:34

## 2023-01-01 RX ADMIN — Medication 3 MILLILITER(S): at 04:18

## 2023-01-01 RX ADMIN — SODIUM CHLORIDE 4 MILLILITER(S): 9 INJECTION INTRAMUSCULAR; INTRAVENOUS; SUBCUTANEOUS at 04:03

## 2023-01-01 RX ADMIN — Medication 1 DROP(S): at 21:20

## 2023-01-01 RX ADMIN — Medication 3 MILLILITER(S): at 08:59

## 2023-01-01 RX ADMIN — HUMAN INSULIN 3 UNIT(S): 100 INJECTION, SUSPENSION SUBCUTANEOUS at 17:41

## 2023-01-01 RX ADMIN — HUMAN INSULIN 6 UNIT(S): 100 INJECTION, SUSPENSION SUBCUTANEOUS at 12:42

## 2023-01-01 RX ADMIN — CHLORHEXIDINE GLUCONATE 15 MILLILITER(S): 213 SOLUTION TOPICAL at 18:06

## 2023-01-01 RX ADMIN — CHLORHEXIDINE GLUCONATE 1 APPLICATION(S): 213 SOLUTION TOPICAL at 06:33

## 2023-01-01 RX ADMIN — MIDODRINE HYDROCHLORIDE 40 MILLIGRAM(S): 2.5 TABLET ORAL at 23:58

## 2023-01-01 RX ADMIN — LEVETIRACETAM 250 MILLIGRAM(S): 250 TABLET, FILM COATED ORAL at 21:35

## 2023-01-01 RX ADMIN — Medication 3 MILLILITER(S): at 16:04

## 2023-01-01 RX ADMIN — Medication 100 MILLIGRAM(S): at 07:15

## 2023-01-01 RX ADMIN — Medication 3 MILLILITER(S): at 04:45

## 2023-01-01 RX ADMIN — Medication 6: at 17:13

## 2023-01-01 RX ADMIN — CHLORHEXIDINE GLUCONATE 15 MILLILITER(S): 213 SOLUTION TOPICAL at 17:04

## 2023-01-01 RX ADMIN — CHLORHEXIDINE GLUCONATE 1 APPLICATION(S): 213 SOLUTION TOPICAL at 05:09

## 2023-01-01 RX ADMIN — Medication 300 MILLIGRAM(S): at 13:08

## 2023-01-01 RX ADMIN — SODIUM CHLORIDE 4 MILLILITER(S): 9 INJECTION INTRAMUSCULAR; INTRAVENOUS; SUBCUTANEOUS at 11:53

## 2023-01-01 RX ADMIN — CHLORHEXIDINE GLUCONATE 15 MILLILITER(S): 213 SOLUTION TOPICAL at 06:09

## 2023-01-01 RX ADMIN — Medication 2: at 06:02

## 2023-01-01 RX ADMIN — APIXABAN 5 MILLIGRAM(S): 2.5 TABLET, FILM COATED ORAL at 18:08

## 2023-01-01 RX ADMIN — Medication 3 MILLILITER(S): at 15:06

## 2023-01-01 RX ADMIN — Medication 40 MILLIGRAM(S): at 05:56

## 2023-01-01 RX ADMIN — Medication 1 DROP(S): at 15:51

## 2023-01-01 RX ADMIN — MEROPENEM 100 MILLIGRAM(S): 1 INJECTION INTRAVENOUS at 12:01

## 2023-01-01 RX ADMIN — LEVETIRACETAM 1000 MILLIGRAM(S): 250 TABLET, FILM COATED ORAL at 12:04

## 2023-01-01 RX ADMIN — Medication 6: at 06:52

## 2023-01-01 RX ADMIN — Medication 3 MILLILITER(S): at 04:37

## 2023-01-01 RX ADMIN — Medication 1 APPLICATION(S): at 06:54

## 2023-01-01 RX ADMIN — Medication 1 DROP(S): at 13:29

## 2023-01-01 RX ADMIN — Medication 1 APPLICATION(S): at 06:24

## 2023-01-01 RX ADMIN — HEPARIN SODIUM 10.5 UNIT(S)/HR: 5000 INJECTION INTRAVENOUS; SUBCUTANEOUS at 02:57

## 2023-01-01 RX ADMIN — Medication 100 MILLIGRAM(S): at 13:17

## 2023-01-01 RX ADMIN — SODIUM CHLORIDE 3 MILLILITER(S): 9 INJECTION INTRAMUSCULAR; INTRAVENOUS; SUBCUTANEOUS at 00:01

## 2023-01-01 RX ADMIN — HEPARIN SODIUM 9.5 UNIT(S)/HR: 5000 INJECTION INTRAVENOUS; SUBCUTANEOUS at 19:26

## 2023-01-01 RX ADMIN — ATORVASTATIN CALCIUM 10 MILLIGRAM(S): 80 TABLET, FILM COATED ORAL at 22:00

## 2023-01-01 RX ADMIN — Medication 650 MILLIGRAM(S): at 22:45

## 2023-01-01 RX ADMIN — Medication 1 APPLICATION(S): at 05:41

## 2023-01-01 RX ADMIN — PIPERACILLIN AND TAZOBACTAM 200 GRAM(S): 4; .5 INJECTION, POWDER, LYOPHILIZED, FOR SOLUTION INTRAVENOUS at 23:57

## 2023-01-01 RX ADMIN — SODIUM CHLORIDE 2 GRAM(S): 9 INJECTION INTRAMUSCULAR; INTRAVENOUS; SUBCUTANEOUS at 17:18

## 2023-01-01 RX ADMIN — PANTOPRAZOLE SODIUM 40 MILLIGRAM(S): 20 TABLET, DELAYED RELEASE ORAL at 06:02

## 2023-01-01 RX ADMIN — CHLORHEXIDINE GLUCONATE 15 MILLILITER(S): 213 SOLUTION TOPICAL at 05:45

## 2023-01-01 RX ADMIN — Medication 6: at 00:28

## 2023-01-01 RX ADMIN — Medication 1 DROP(S): at 22:25

## 2023-01-01 RX ADMIN — ATORVASTATIN CALCIUM 10 MILLIGRAM(S): 80 TABLET, FILM COATED ORAL at 21:25

## 2023-01-01 RX ADMIN — Medication 1 DROP(S): at 05:54

## 2023-01-01 RX ADMIN — ALBUTEROL 2.5 MILLIGRAM(S): 90 AEROSOL, METERED ORAL at 16:45

## 2023-01-01 RX ADMIN — CHLORHEXIDINE GLUCONATE 15 MILLILITER(S): 213 SOLUTION TOPICAL at 19:05

## 2023-01-01 RX ADMIN — Medication 4 DROP(S): at 05:22

## 2023-01-01 RX ADMIN — HUMAN INSULIN 18 UNIT(S): 100 INJECTION, SUSPENSION SUBCUTANEOUS at 17:36

## 2023-01-01 RX ADMIN — DIPHENHYDRAMINE HYDROCHLORIDE AND LIDOCAINE HYDROCHLORIDE AND ALUMINUM HYDROXIDE AND MAGNESIUM HYDRO 10 MILLILITER(S): KIT at 00:03

## 2023-01-01 RX ADMIN — HUMAN INSULIN 5 UNIT(S): 100 INJECTION, SUSPENSION SUBCUTANEOUS at 00:21

## 2023-01-01 RX ADMIN — Medication 1 APPLICATION(S): at 18:09

## 2023-01-01 RX ADMIN — Medication 25 MILLILITER(S): at 09:45

## 2023-01-01 RX ADMIN — SEVELAMER CARBONATE 1600 MILLIGRAM(S): 2400 POWDER, FOR SUSPENSION ORAL at 22:14

## 2023-01-01 RX ADMIN — HUMAN INSULIN 11 UNIT(S): 100 INJECTION, SUSPENSION SUBCUTANEOUS at 06:06

## 2023-01-01 RX ADMIN — ERYTHROPOIETIN 10000 UNIT(S): 10000 INJECTION, SOLUTION INTRAVENOUS; SUBCUTANEOUS at 12:50

## 2023-01-01 RX ADMIN — MIDODRINE HYDROCHLORIDE 40 MILLIGRAM(S): 2.5 TABLET ORAL at 05:45

## 2023-01-01 RX ADMIN — POLYMYXIN B SULFATE 250 UNIT(S): 500000 INJECTION, POWDER, LYOPHILIZED, FOR SOLUTION INTRAMUSCULAR; INTRATHECAL; INTRAVENOUS; OPHTHALMIC at 21:30

## 2023-01-01 RX ADMIN — PANTOPRAZOLE SODIUM 40 MILLIGRAM(S): 20 TABLET, DELAYED RELEASE ORAL at 05:27

## 2023-01-01 RX ADMIN — PANTOPRAZOLE SODIUM 40 MILLIGRAM(S): 20 TABLET, DELAYED RELEASE ORAL at 05:34

## 2023-01-01 RX ADMIN — HEPARIN SODIUM 9.5 UNIT(S)/HR: 5000 INJECTION INTRAVENOUS; SUBCUTANEOUS at 20:04

## 2023-01-01 RX ADMIN — PANTOPRAZOLE SODIUM 40 MILLIGRAM(S): 20 TABLET, DELAYED RELEASE ORAL at 05:56

## 2023-01-01 RX ADMIN — Medication 4: at 18:29

## 2023-01-01 RX ADMIN — Medication 400 MILLIGRAM(S): at 09:39

## 2023-01-01 RX ADMIN — Medication 1 DROP(S): at 15:55

## 2023-01-01 RX ADMIN — ATORVASTATIN CALCIUM 10 MILLIGRAM(S): 80 TABLET, FILM COATED ORAL at 23:53

## 2023-01-01 RX ADMIN — CHLORHEXIDINE GLUCONATE 15 MILLILITER(S): 213 SOLUTION TOPICAL at 18:10

## 2023-01-01 RX ADMIN — Medication 1 DROP(S): at 10:24

## 2023-01-01 RX ADMIN — APIXABAN 5 MILLIGRAM(S): 2.5 TABLET, FILM COATED ORAL at 17:30

## 2023-01-01 RX ADMIN — Medication 50 MILLILITER(S): at 20:46

## 2023-01-01 RX ADMIN — SODIUM CHLORIDE 2 GRAM(S): 9 INJECTION INTRAMUSCULAR; INTRAVENOUS; SUBCUTANEOUS at 19:08

## 2023-01-01 RX ADMIN — Medication 3 MILLILITER(S): at 15:51

## 2023-01-01 RX ADMIN — DROXIDOPA 200 MILLIGRAM(S): 100 CAPSULE ORAL at 06:48

## 2023-01-01 RX ADMIN — ERYTHROPOIETIN 10000 UNIT(S): 10000 INJECTION, SOLUTION INTRAVENOUS; SUBCUTANEOUS at 07:46

## 2023-01-01 RX ADMIN — ATORVASTATIN CALCIUM 10 MILLIGRAM(S): 80 TABLET, FILM COATED ORAL at 22:45

## 2023-01-01 RX ADMIN — Medication 3 MILLILITER(S): at 08:50

## 2023-01-01 RX ADMIN — Medication 1 APPLICATION(S): at 05:38

## 2023-01-01 RX ADMIN — ALBUTEROL 2.5 MILLIGRAM(S): 90 AEROSOL, METERED ORAL at 03:27

## 2023-01-01 RX ADMIN — Medication 4: at 23:28

## 2023-01-01 RX ADMIN — Medication 1 APPLICATION(S): at 05:49

## 2023-01-01 RX ADMIN — SODIUM CHLORIDE 4 MILLILITER(S): 9 INJECTION INTRAMUSCULAR; INTRAVENOUS; SUBCUTANEOUS at 09:25

## 2023-01-01 RX ADMIN — MUPIROCIN 1 APPLICATION(S): 20 OINTMENT TOPICAL at 06:11

## 2023-01-01 RX ADMIN — SODIUM CHLORIDE 3 MILLILITER(S): 9 INJECTION INTRAMUSCULAR; INTRAVENOUS; SUBCUTANEOUS at 05:27

## 2023-01-01 RX ADMIN — INSULIN HUMAN 10 UNIT(S): 100 INJECTION, SOLUTION SUBCUTANEOUS at 19:08

## 2023-01-01 RX ADMIN — ATORVASTATIN CALCIUM 10 MILLIGRAM(S): 80 TABLET, FILM COATED ORAL at 21:19

## 2023-01-01 RX ADMIN — SODIUM CHLORIDE 4 MILLILITER(S): 9 INJECTION INTRAMUSCULAR; INTRAVENOUS; SUBCUTANEOUS at 22:59

## 2023-01-01 RX ADMIN — PANTOPRAZOLE SODIUM 40 MILLIGRAM(S): 20 TABLET, DELAYED RELEASE ORAL at 18:33

## 2023-01-01 RX ADMIN — SODIUM CHLORIDE 2 GRAM(S): 9 INJECTION INTRAMUSCULAR; INTRAVENOUS; SUBCUTANEOUS at 05:54

## 2023-01-01 RX ADMIN — CHLORHEXIDINE GLUCONATE 15 MILLILITER(S): 213 SOLUTION TOPICAL at 17:31

## 2023-01-01 RX ADMIN — Medication 650 MILLIGRAM(S): at 18:06

## 2023-01-01 RX ADMIN — Medication 1 TABLET(S): at 13:08

## 2023-01-01 RX ADMIN — DROXIDOPA 600 MILLIGRAM(S): 100 CAPSULE ORAL at 05:34

## 2023-01-01 RX ADMIN — Medication 10 MILLIGRAM(S): at 17:15

## 2023-01-01 RX ADMIN — LETROZOLE 2.5 MILLIGRAM(S): 2.5 TABLET, FILM COATED ORAL at 13:08

## 2023-01-01 RX ADMIN — CHLORHEXIDINE GLUCONATE 15 MILLILITER(S): 213 SOLUTION TOPICAL at 17:50

## 2023-01-01 RX ADMIN — HUMAN INSULIN 6 UNIT(S): 100 INJECTION, SUSPENSION SUBCUTANEOUS at 05:42

## 2023-01-01 RX ADMIN — Medication 80 MILLIGRAM(S): at 17:51

## 2023-01-01 RX ADMIN — ERYTHROPOIETIN 10000 UNIT(S): 10000 INJECTION, SOLUTION INTRAVENOUS; SUBCUTANEOUS at 12:51

## 2023-01-01 RX ADMIN — Medication 1 APPLICATION(S): at 18:43

## 2023-01-01 RX ADMIN — DROXIDOPA 600 MILLIGRAM(S): 100 CAPSULE ORAL at 21:37

## 2023-01-01 RX ADMIN — HEPARIN SODIUM 5000 UNIT(S): 5000 INJECTION INTRAVENOUS; SUBCUTANEOUS at 05:32

## 2023-01-01 RX ADMIN — Medication 1 APPLICATION(S): at 06:18

## 2023-01-01 RX ADMIN — Medication 1 DROP(S): at 13:37

## 2023-01-01 RX ADMIN — Medication 0.5 MILLIGRAM(S): at 05:29

## 2023-01-01 RX ADMIN — Medication 1 DROP(S): at 02:36

## 2023-01-01 RX ADMIN — CEFEPIME 100 MILLIGRAM(S): 1 INJECTION, POWDER, FOR SOLUTION INTRAMUSCULAR; INTRAVENOUS at 17:47

## 2023-01-01 RX ADMIN — SEVELAMER CARBONATE 1600 MILLIGRAM(S): 2400 POWDER, FOR SUSPENSION ORAL at 05:56

## 2023-01-01 RX ADMIN — ONDANSETRON 4 MILLIGRAM(S): 8 TABLET, FILM COATED ORAL at 14:16

## 2023-01-01 RX ADMIN — PANTOPRAZOLE SODIUM 40 MILLIGRAM(S): 20 TABLET, DELAYED RELEASE ORAL at 05:01

## 2023-01-01 RX ADMIN — MEROPENEM 100 MILLIGRAM(S): 1 INJECTION INTRAVENOUS at 17:28

## 2023-01-01 RX ADMIN — MEROPENEM 100 MILLIGRAM(S): 1 INJECTION INTRAVENOUS at 06:04

## 2023-01-01 RX ADMIN — MUPIROCIN 1 APPLICATION(S): 20 OINTMENT TOPICAL at 05:14

## 2023-01-01 RX ADMIN — VASOPRESSIN 6 UNIT(S)/MIN: 20 INJECTION INTRAVENOUS at 12:39

## 2023-01-01 RX ADMIN — HUMAN INSULIN 3 UNIT(S): 100 INJECTION, SUSPENSION SUBCUTANEOUS at 11:58

## 2023-01-01 RX ADMIN — MIDODRINE HYDROCHLORIDE 40 MILLIGRAM(S): 2.5 TABLET ORAL at 17:10

## 2023-01-01 RX ADMIN — Medication 2: at 23:36

## 2023-01-01 RX ADMIN — BUMETANIDE 2 MILLIGRAM(S): 0.25 INJECTION INTRAMUSCULAR; INTRAVENOUS at 17:00

## 2023-01-01 RX ADMIN — Medication 1 APPLICATION(S): at 23:53

## 2023-01-01 RX ADMIN — Medication 3 MILLILITER(S): at 09:15

## 2023-01-01 RX ADMIN — ALBUTEROL 2.5 MILLIGRAM(S): 90 AEROSOL, METERED ORAL at 09:24

## 2023-01-01 RX ADMIN — Medication 1: at 08:17

## 2023-01-01 RX ADMIN — MEROPENEM 100 MILLIGRAM(S): 1 INJECTION INTRAVENOUS at 18:05

## 2023-01-01 RX ADMIN — Medication 2: at 23:55

## 2023-01-01 RX ADMIN — PANTOPRAZOLE SODIUM 40 MILLIGRAM(S): 20 TABLET, DELAYED RELEASE ORAL at 05:07

## 2023-01-01 RX ADMIN — CHLORHEXIDINE GLUCONATE 15 MILLILITER(S): 213 SOLUTION TOPICAL at 17:27

## 2023-01-01 RX ADMIN — SODIUM CHLORIDE 4 MILLILITER(S): 9 INJECTION INTRAMUSCULAR; INTRAVENOUS; SUBCUTANEOUS at 03:51

## 2023-01-01 RX ADMIN — SODIUM CHLORIDE 2 GRAM(S): 9 INJECTION INTRAMUSCULAR; INTRAVENOUS; SUBCUTANEOUS at 07:01

## 2023-01-01 RX ADMIN — Medication 12: at 00:18

## 2023-01-01 RX ADMIN — CHLORHEXIDINE GLUCONATE 15 MILLILITER(S): 213 SOLUTION TOPICAL at 06:39

## 2023-01-01 RX ADMIN — LEVETIRACETAM 1000 MILLIGRAM(S): 250 TABLET, FILM COATED ORAL at 14:42

## 2023-01-01 RX ADMIN — HUMAN INSULIN 6 UNIT(S): 100 INJECTION, SUSPENSION SUBCUTANEOUS at 05:47

## 2023-01-01 RX ADMIN — MIDAZOLAM HYDROCHLORIDE 6 MILLIGRAM(S): 1 INJECTION, SOLUTION INTRAMUSCULAR; INTRAVENOUS at 11:00

## 2023-01-01 RX ADMIN — MEROPENEM 100 MILLIGRAM(S): 1 INJECTION INTRAVENOUS at 17:58

## 2023-01-01 RX ADMIN — Medication 1 DROP(S): at 21:29

## 2023-01-01 RX ADMIN — Medication 0.5 MILLIGRAM(S): at 06:34

## 2023-01-01 RX ADMIN — Medication 6.23 MICROGRAM(S)/KG/MIN: at 19:01

## 2023-01-01 RX ADMIN — MIDODRINE HYDROCHLORIDE 40 MILLIGRAM(S): 2.5 TABLET ORAL at 17:39

## 2023-01-01 RX ADMIN — Medication 6.23 MICROGRAM(S)/KG/MIN: at 10:11

## 2023-01-01 RX ADMIN — SODIUM CHLORIDE 2 GRAM(S): 9 INJECTION INTRAMUSCULAR; INTRAVENOUS; SUBCUTANEOUS at 17:07

## 2023-01-01 RX ADMIN — HUMAN INSULIN 4 UNIT(S): 100 INJECTION, SUSPENSION SUBCUTANEOUS at 17:42

## 2023-01-01 RX ADMIN — Medication 4: at 17:23

## 2023-01-01 RX ADMIN — HUMAN INSULIN 6 UNIT(S): 100 INJECTION, SUSPENSION SUBCUTANEOUS at 00:52

## 2023-01-01 RX ADMIN — MIDODRINE HYDROCHLORIDE 30 MILLIGRAM(S): 2.5 TABLET ORAL at 21:08

## 2023-01-01 RX ADMIN — Medication 1 DROP(S): at 00:32

## 2023-01-01 RX ADMIN — HUMAN INSULIN 6 UNIT(S): 100 INJECTION, SUSPENSION SUBCUTANEOUS at 12:20

## 2023-01-01 RX ADMIN — Medication 4: at 11:54

## 2023-01-01 RX ADMIN — HUMAN INSULIN 6 UNIT(S): 100 INJECTION, SUSPENSION SUBCUTANEOUS at 05:34

## 2023-01-01 RX ADMIN — SODIUM CHLORIDE 3 MILLILITER(S): 9 INJECTION INTRAMUSCULAR; INTRAVENOUS; SUBCUTANEOUS at 17:28

## 2023-01-01 RX ADMIN — CHLORHEXIDINE GLUCONATE 15 MILLILITER(S): 213 SOLUTION TOPICAL at 17:49

## 2023-01-01 RX ADMIN — Medication 40 MILLIGRAM(S): at 05:48

## 2023-01-01 RX ADMIN — Medication 1 DROP(S): at 21:13

## 2023-01-01 RX ADMIN — PANTOPRAZOLE SODIUM 40 MILLIGRAM(S): 20 TABLET, DELAYED RELEASE ORAL at 17:29

## 2023-01-01 RX ADMIN — Medication 650 MILLIGRAM(S): at 15:40

## 2023-01-01 RX ADMIN — Medication 1 DROP(S): at 17:28

## 2023-01-01 RX ADMIN — ALBUTEROL 2.5 MILLIGRAM(S): 90 AEROSOL, METERED ORAL at 09:59

## 2023-01-01 RX ADMIN — Medication 1 DROP(S): at 01:42

## 2023-01-01 RX ADMIN — HEPARIN SODIUM 5000 UNIT(S): 5000 INJECTION INTRAVENOUS; SUBCUTANEOUS at 05:02

## 2023-01-01 RX ADMIN — MIDODRINE HYDROCHLORIDE 20 MILLIGRAM(S): 2.5 TABLET ORAL at 17:57

## 2023-01-01 RX ADMIN — Medication 3 MILLILITER(S): at 10:05

## 2023-01-01 RX ADMIN — DEXMEDETOMIDINE HYDROCHLORIDE IN 0.9% SODIUM CHLORIDE 16.6 MICROGRAM(S)/KG/HR: 4 INJECTION INTRAVENOUS at 22:05

## 2023-01-01 RX ADMIN — SODIUM CHLORIDE 4 MILLILITER(S): 9 INJECTION INTRAMUSCULAR; INTRAVENOUS; SUBCUTANEOUS at 09:45

## 2023-01-01 RX ADMIN — DROXIDOPA 600 MILLIGRAM(S): 100 CAPSULE ORAL at 17:41

## 2023-01-01 RX ADMIN — SODIUM CHLORIDE 4 MILLILITER(S): 9 INJECTION INTRAMUSCULAR; INTRAVENOUS; SUBCUTANEOUS at 03:36

## 2023-01-01 RX ADMIN — Medication 4 DROP(S): at 17:32

## 2023-01-01 RX ADMIN — SEVELAMER CARBONATE 1600 MILLIGRAM(S): 2400 POWDER, FOR SUSPENSION ORAL at 13:03

## 2023-01-01 RX ADMIN — PANTOPRAZOLE SODIUM 40 MILLIGRAM(S): 20 TABLET, DELAYED RELEASE ORAL at 17:34

## 2023-01-01 RX ADMIN — Medication 300 MILLIGRAM(S): at 13:55

## 2023-01-01 RX ADMIN — CARVEDILOL PHOSPHATE 12.5 MILLIGRAM(S): 80 CAPSULE, EXTENDED RELEASE ORAL at 17:47

## 2023-01-01 RX ADMIN — Medication 4: at 12:51

## 2023-01-01 RX ADMIN — Medication 3 MILLILITER(S): at 15:27

## 2023-01-01 RX ADMIN — HEPARIN SODIUM 5000 UNIT(S): 5000 INJECTION INTRAVENOUS; SUBCUTANEOUS at 21:04

## 2023-01-01 RX ADMIN — Medication 3 MILLILITER(S): at 09:24

## 2023-01-01 RX ADMIN — Medication 600 MILLIGRAM(S): at 14:04

## 2023-01-01 RX ADMIN — HYDROMORPHONE HYDROCHLORIDE 0.5 MILLIGRAM(S): 2 INJECTION INTRAMUSCULAR; INTRAVENOUS; SUBCUTANEOUS at 14:55

## 2023-01-01 RX ADMIN — Medication 8: at 12:03

## 2023-01-01 RX ADMIN — Medication 1 APPLICATION(S): at 07:01

## 2023-01-01 RX ADMIN — FENTANYL CITRATE 100 MICROGRAM(S): 50 INJECTION INTRAVENOUS at 17:10

## 2023-01-01 RX ADMIN — HUMAN INSULIN 6 UNIT(S): 100 INJECTION, SUSPENSION SUBCUTANEOUS at 12:39

## 2023-01-01 RX ADMIN — MIDODRINE HYDROCHLORIDE 40 MILLIGRAM(S): 2.5 TABLET ORAL at 17:22

## 2023-01-01 RX ADMIN — Medication 3 MILLIGRAM(S): at 21:24

## 2023-01-01 RX ADMIN — SODIUM CHLORIDE 4 MILLILITER(S): 9 INJECTION INTRAMUSCULAR; INTRAVENOUS; SUBCUTANEOUS at 21:46

## 2023-01-01 RX ADMIN — Medication 4: at 05:42

## 2023-01-01 RX ADMIN — DIPHENHYDRAMINE HYDROCHLORIDE AND LIDOCAINE HYDROCHLORIDE AND ALUMINUM HYDROXIDE AND MAGNESIUM HYDRO 10 MILLILITER(S): KIT at 05:28

## 2023-01-01 RX ADMIN — HEPARIN SODIUM 9.5 UNIT(S)/HR: 5000 INJECTION INTRAVENOUS; SUBCUTANEOUS at 20:49

## 2023-01-01 RX ADMIN — HUMAN INSULIN 11 UNIT(S): 100 INJECTION, SUSPENSION SUBCUTANEOUS at 17:07

## 2023-01-01 RX ADMIN — HUMAN INSULIN 4 UNIT(S): 100 INJECTION, SUSPENSION SUBCUTANEOUS at 12:25

## 2023-01-01 RX ADMIN — Medication 3 MILLILITER(S): at 16:29

## 2023-01-01 RX ADMIN — PANTOPRAZOLE SODIUM 40 MILLIGRAM(S): 20 TABLET, DELAYED RELEASE ORAL at 06:22

## 2023-01-01 RX ADMIN — Medication 1 DROP(S): at 11:09

## 2023-01-01 RX ADMIN — HUMAN INSULIN 8 UNIT(S): 100 INJECTION, SUSPENSION SUBCUTANEOUS at 05:21

## 2023-01-01 RX ADMIN — ATORVASTATIN CALCIUM 40 MILLIGRAM(S): 80 TABLET, FILM COATED ORAL at 21:41

## 2023-01-01 RX ADMIN — MUPIROCIN 1 APPLICATION(S): 20 OINTMENT TOPICAL at 18:19

## 2023-01-01 RX ADMIN — MIDODRINE HYDROCHLORIDE 30 MILLIGRAM(S): 2.5 TABLET ORAL at 21:49

## 2023-01-01 RX ADMIN — FENTANYL CITRATE 100 MICROGRAM(S): 50 INJECTION INTRAVENOUS at 01:55

## 2023-01-01 RX ADMIN — SODIUM CHLORIDE 4 MILLILITER(S): 9 INJECTION INTRAMUSCULAR; INTRAVENOUS; SUBCUTANEOUS at 21:01

## 2023-01-01 RX ADMIN — Medication 0.5 MILLIGRAM(S): at 17:49

## 2023-01-01 RX ADMIN — PROPOFOL 20 MICROGRAM(S)/KG/MIN: 10 INJECTION, EMULSION INTRAVENOUS at 05:50

## 2023-01-01 RX ADMIN — MIDODRINE HYDROCHLORIDE 30 MILLIGRAM(S): 2.5 TABLET ORAL at 21:45

## 2023-01-01 RX ADMIN — APIXABAN 5 MILLIGRAM(S): 2.5 TABLET, FILM COATED ORAL at 05:51

## 2023-01-01 RX ADMIN — Medication 0.5 MILLIGRAM(S): at 05:39

## 2023-01-01 RX ADMIN — BUMETANIDE 2 MILLIGRAM(S): 0.25 INJECTION INTRAMUSCULAR; INTRAVENOUS at 05:35

## 2023-01-01 RX ADMIN — Medication 1 APPLICATION(S): at 11:21

## 2023-01-01 RX ADMIN — HUMAN INSULIN 6 UNIT(S): 100 INJECTION, SUSPENSION SUBCUTANEOUS at 06:47

## 2023-01-01 RX ADMIN — Medication 1 APPLICATION(S): at 12:00

## 2023-01-01 RX ADMIN — Medication 1 APPLICATION(S): at 18:25

## 2023-01-01 RX ADMIN — MIDODRINE HYDROCHLORIDE 30 MILLIGRAM(S): 2.5 TABLET ORAL at 22:46

## 2023-01-01 RX ADMIN — PANTOPRAZOLE SODIUM 40 MILLIGRAM(S): 20 TABLET, DELAYED RELEASE ORAL at 17:04

## 2023-01-01 RX ADMIN — SODIUM CHLORIDE 3 MILLILITER(S): 9 INJECTION INTRAMUSCULAR; INTRAVENOUS; SUBCUTANEOUS at 17:30

## 2023-01-01 RX ADMIN — Medication 100 MILLIGRAM(S): at 18:34

## 2023-01-01 RX ADMIN — ATORVASTATIN CALCIUM 40 MILLIGRAM(S): 80 TABLET, FILM COATED ORAL at 22:02

## 2023-01-01 RX ADMIN — CHLORHEXIDINE GLUCONATE 15 MILLILITER(S): 213 SOLUTION TOPICAL at 05:09

## 2023-01-01 RX ADMIN — HEPARIN SODIUM 5000 UNIT(S): 5000 INJECTION INTRAVENOUS; SUBCUTANEOUS at 05:19

## 2023-01-01 RX ADMIN — PANTOPRAZOLE SODIUM 40 MILLIGRAM(S): 20 TABLET, DELAYED RELEASE ORAL at 05:49

## 2023-01-01 RX ADMIN — MIDODRINE HYDROCHLORIDE 20 MILLIGRAM(S): 2.5 TABLET ORAL at 06:12

## 2023-01-01 RX ADMIN — PIPERACILLIN AND TAZOBACTAM 200 GRAM(S): 4; .5 INJECTION, POWDER, LYOPHILIZED, FOR SOLUTION INTRAVENOUS at 11:48

## 2023-01-01 RX ADMIN — Medication 1 DROP(S): at 10:04

## 2023-01-01 RX ADMIN — Medication 1 APPLICATION(S): at 23:17

## 2023-01-01 RX ADMIN — Medication 1 DROP(S): at 10:15

## 2023-01-01 RX ADMIN — Medication 1: at 18:50

## 2023-01-01 RX ADMIN — Medication 1 APPLICATION(S): at 05:12

## 2023-01-01 RX ADMIN — Medication 40 MILLIGRAM(S): at 06:24

## 2023-01-01 RX ADMIN — Medication 1 APPLICATION(S): at 00:23

## 2023-01-01 RX ADMIN — DROXIDOPA 600 MILLIGRAM(S): 100 CAPSULE ORAL at 02:09

## 2023-01-01 RX ADMIN — Medication 3 MILLILITER(S): at 15:54

## 2023-01-01 RX ADMIN — Medication 400 MILLIGRAM(S): at 08:36

## 2023-01-01 RX ADMIN — HEPARIN SODIUM 10.5 UNIT(S)/HR: 5000 INJECTION INTRAVENOUS; SUBCUTANEOUS at 10:40

## 2023-01-01 RX ADMIN — HUMAN INSULIN 6 UNIT(S): 100 INJECTION, SUSPENSION SUBCUTANEOUS at 17:13

## 2023-01-01 RX ADMIN — Medication 3 MILLILITER(S): at 22:38

## 2023-01-01 RX ADMIN — SENNA PLUS 10 MILLILITER(S): 8.6 TABLET ORAL at 12:00

## 2023-01-01 RX ADMIN — HUMAN INSULIN 6 UNIT(S): 100 INJECTION, SUSPENSION SUBCUTANEOUS at 17:26

## 2023-01-01 RX ADMIN — Medication 1 DROP(S): at 21:24

## 2023-01-01 RX ADMIN — MUPIROCIN 1 APPLICATION(S): 20 OINTMENT TOPICAL at 05:02

## 2023-01-01 RX ADMIN — ATORVASTATIN CALCIUM 40 MILLIGRAM(S): 80 TABLET, FILM COATED ORAL at 21:25

## 2023-01-01 RX ADMIN — Medication 3 MILLILITER(S): at 10:08

## 2023-01-01 RX ADMIN — BUMETANIDE 2 MILLIGRAM(S): 0.25 INJECTION INTRAMUSCULAR; INTRAVENOUS at 13:26

## 2023-01-01 RX ADMIN — Medication 81 MILLIGRAM(S): at 11:24

## 2023-01-01 RX ADMIN — MIDODRINE HYDROCHLORIDE 40 MILLIGRAM(S): 2.5 TABLET ORAL at 23:14

## 2023-01-01 RX ADMIN — CALAMINE AND ZINC OXIDE AND PHENOL 1 APPLICATION(S): 160; 10 LOTION TOPICAL at 12:15

## 2023-01-01 RX ADMIN — Medication 325 MILLIGRAM(S): at 12:59

## 2023-01-01 RX ADMIN — Medication 650 MILLIGRAM(S): at 18:10

## 2023-01-01 RX ADMIN — MUPIROCIN 1 APPLICATION(S): 20 OINTMENT TOPICAL at 05:12

## 2023-01-01 RX ADMIN — HUMAN INSULIN 3 UNIT(S): 100 INJECTION, SUSPENSION SUBCUTANEOUS at 18:27

## 2023-01-01 RX ADMIN — Medication 6: at 17:30

## 2023-01-01 RX ADMIN — Medication 3 MILLILITER(S): at 14:29

## 2023-01-01 RX ADMIN — HUMAN INSULIN 11 UNIT(S): 100 INJECTION, SUSPENSION SUBCUTANEOUS at 18:05

## 2023-01-01 RX ADMIN — Medication 6.23 MICROGRAM(S)/KG/MIN: at 22:30

## 2023-01-01 RX ADMIN — HUMAN INSULIN 6 UNIT(S): 100 INJECTION, SUSPENSION SUBCUTANEOUS at 11:34

## 2023-01-01 RX ADMIN — Medication 6 MILLIGRAM(S): at 08:58

## 2023-01-01 RX ADMIN — Medication 100 MILLIGRAM(S): at 17:02

## 2023-01-01 RX ADMIN — SODIUM CHLORIDE 4 MILLILITER(S): 9 INJECTION INTRAMUSCULAR; INTRAVENOUS; SUBCUTANEOUS at 03:52

## 2023-01-01 RX ADMIN — HUMAN INSULIN 6 UNIT(S): 100 INJECTION, SUSPENSION SUBCUTANEOUS at 17:05

## 2023-01-01 RX ADMIN — Medication 1 APPLICATION(S): at 17:57

## 2023-01-01 RX ADMIN — POLYMYXIN B SULFATE 250 UNIT(S): 500000 INJECTION, POWDER, LYOPHILIZED, FOR SOLUTION INTRAMUSCULAR; INTRATHECAL; INTRAVENOUS; OPHTHALMIC at 05:13

## 2023-01-01 RX ADMIN — DROXIDOPA 600 MILLIGRAM(S): 100 CAPSULE ORAL at 02:10

## 2023-01-01 RX ADMIN — HUMAN INSULIN 6 UNIT(S): 100 INJECTION, SUSPENSION SUBCUTANEOUS at 05:32

## 2023-01-01 RX ADMIN — DROXIDOPA 600 MILLIGRAM(S): 100 CAPSULE ORAL at 14:37

## 2023-01-01 RX ADMIN — Medication 20 MILLIGRAM(S): at 05:28

## 2023-01-01 RX ADMIN — ATORVASTATIN CALCIUM 10 MILLIGRAM(S): 80 TABLET, FILM COATED ORAL at 21:12

## 2023-01-01 RX ADMIN — Medication 4 DROP(S): at 18:14

## 2023-01-01 RX ADMIN — DROXIDOPA 600 MILLIGRAM(S): 100 CAPSULE ORAL at 17:18

## 2023-01-01 RX ADMIN — Medication 8: at 11:23

## 2023-01-01 RX ADMIN — Medication 3 MILLILITER(S): at 03:51

## 2023-01-01 RX ADMIN — Medication 6 UNIT(S): at 18:49

## 2023-01-01 RX ADMIN — DEXMEDETOMIDINE HYDROCHLORIDE IN 0.9% SODIUM CHLORIDE 16.6 MICROGRAM(S)/KG/HR: 4 INJECTION INTRAVENOUS at 20:12

## 2023-01-01 RX ADMIN — PANTOPRAZOLE SODIUM 40 MILLIGRAM(S): 20 TABLET, DELAYED RELEASE ORAL at 18:29

## 2023-01-01 RX ADMIN — SODIUM CHLORIDE 1 GRAM(S): 9 INJECTION INTRAMUSCULAR; INTRAVENOUS; SUBCUTANEOUS at 17:15

## 2023-01-01 RX ADMIN — Medication 3 UNIT(S): at 12:58

## 2023-01-01 RX ADMIN — SEVELAMER CARBONATE 1600 MILLIGRAM(S): 2400 POWDER, FOR SUSPENSION ORAL at 14:55

## 2023-01-01 RX ADMIN — PANTOPRAZOLE SODIUM 40 MILLIGRAM(S): 20 TABLET, DELAYED RELEASE ORAL at 06:28

## 2023-01-01 RX ADMIN — Medication 1 APPLICATION(S): at 23:36

## 2023-01-01 RX ADMIN — HUMAN INSULIN 11 UNIT(S): 100 INJECTION, SUSPENSION SUBCUTANEOUS at 11:23

## 2023-01-01 RX ADMIN — Medication 1 TABLET(S): at 13:18

## 2023-01-01 RX ADMIN — MIDODRINE HYDROCHLORIDE 30 MILLIGRAM(S): 2.5 TABLET ORAL at 21:35

## 2023-01-01 RX ADMIN — MIDODRINE HYDROCHLORIDE 40 MILLIGRAM(S): 2.5 TABLET ORAL at 06:49

## 2023-01-01 RX ADMIN — HUMAN INSULIN 3 UNIT(S): 100 INJECTION, SUSPENSION SUBCUTANEOUS at 17:14

## 2023-01-01 RX ADMIN — PANTOPRAZOLE SODIUM 40 MILLIGRAM(S): 20 TABLET, DELAYED RELEASE ORAL at 05:19

## 2023-01-01 RX ADMIN — HUMAN INSULIN 6 UNIT(S): 100 INJECTION, SUSPENSION SUBCUTANEOUS at 02:21

## 2023-01-01 RX ADMIN — SODIUM CHLORIDE 2 GRAM(S): 9 INJECTION INTRAMUSCULAR; INTRAVENOUS; SUBCUTANEOUS at 05:13

## 2023-01-01 RX ADMIN — Medication 1 APPLICATION(S): at 17:52

## 2023-01-01 RX ADMIN — PANTOPRAZOLE SODIUM 40 MILLIGRAM(S): 20 TABLET, DELAYED RELEASE ORAL at 05:40

## 2023-01-01 RX ADMIN — Medication 1 PACKET(S): at 11:45

## 2023-01-01 RX ADMIN — APIXABAN 5 MILLIGRAM(S): 2.5 TABLET, FILM COATED ORAL at 05:32

## 2023-01-01 RX ADMIN — HEPARIN SODIUM 5000 UNIT(S): 5000 INJECTION INTRAVENOUS; SUBCUTANEOUS at 06:27

## 2023-01-01 RX ADMIN — SODIUM CHLORIDE 4 MILLILITER(S): 9 INJECTION INTRAMUSCULAR; INTRAVENOUS; SUBCUTANEOUS at 09:38

## 2023-01-01 RX ADMIN — Medication 1 APPLICATION(S): at 23:55

## 2023-01-01 RX ADMIN — HUMAN INSULIN 3 UNIT(S): 100 INJECTION, SUSPENSION SUBCUTANEOUS at 05:42

## 2023-01-01 RX ADMIN — HUMAN INSULIN 3 UNIT(S): 100 INJECTION, SUSPENSION SUBCUTANEOUS at 17:23

## 2023-01-01 RX ADMIN — HUMAN INSULIN 6 UNIT(S): 100 INJECTION, SUSPENSION SUBCUTANEOUS at 05:29

## 2023-01-01 RX ADMIN — CHLORHEXIDINE GLUCONATE 1 APPLICATION(S): 213 SOLUTION TOPICAL at 06:38

## 2023-01-01 RX ADMIN — PANTOPRAZOLE SODIUM 40 MILLIGRAM(S): 20 TABLET, DELAYED RELEASE ORAL at 17:07

## 2023-01-01 RX ADMIN — CHLORHEXIDINE GLUCONATE 1 APPLICATION(S): 213 SOLUTION TOPICAL at 05:39

## 2023-01-01 RX ADMIN — Medication 3 MILLILITER(S): at 09:33

## 2023-01-01 RX ADMIN — Medication 4: at 12:00

## 2023-01-01 RX ADMIN — ATORVASTATIN CALCIUM 40 MILLIGRAM(S): 80 TABLET, FILM COATED ORAL at 21:22

## 2023-01-01 RX ADMIN — BUMETANIDE 2.5 MG/HR: 0.25 INJECTION INTRAMUSCULAR; INTRAVENOUS at 16:24

## 2023-01-01 RX ADMIN — SODIUM CHLORIDE 4 MILLILITER(S): 9 INJECTION INTRAMUSCULAR; INTRAVENOUS; SUBCUTANEOUS at 16:38

## 2023-01-01 RX ADMIN — Medication 1 APPLICATION(S): at 18:36

## 2023-01-01 RX ADMIN — ALBUTEROL 2.5 MILLIGRAM(S): 90 AEROSOL, METERED ORAL at 22:45

## 2023-01-01 RX ADMIN — Medication 1 TABLET(S): at 12:06

## 2023-01-01 RX ADMIN — MIDODRINE HYDROCHLORIDE 40 MILLIGRAM(S): 2.5 TABLET ORAL at 12:36

## 2023-01-01 RX ADMIN — Medication 4 DROP(S): at 05:35

## 2023-01-01 RX ADMIN — HEPARIN SODIUM 9.5 UNIT(S)/HR: 5000 INJECTION INTRAVENOUS; SUBCUTANEOUS at 03:11

## 2023-01-01 RX ADMIN — HUMAN INSULIN 6 UNIT(S): 100 INJECTION, SUSPENSION SUBCUTANEOUS at 11:33

## 2023-01-01 RX ADMIN — Medication 4: at 05:54

## 2023-01-01 RX ADMIN — POLYETHYLENE GLYCOL 3350 17 GRAM(S): 17 POWDER, FOR SOLUTION ORAL at 17:19

## 2023-01-01 RX ADMIN — Medication 6.23 MICROGRAM(S)/KG/MIN: at 19:58

## 2023-01-01 RX ADMIN — MIDODRINE HYDROCHLORIDE 30 MILLIGRAM(S): 2.5 TABLET ORAL at 22:24

## 2023-01-01 RX ADMIN — Medication 6.23 MICROGRAM(S)/KG/MIN: at 19:51

## 2023-01-01 RX ADMIN — MIDODRINE HYDROCHLORIDE 10 MILLIGRAM(S): 2.5 TABLET ORAL at 06:09

## 2023-01-01 RX ADMIN — MIDAZOLAM HYDROCHLORIDE 4 MILLIGRAM(S): 1 INJECTION, SOLUTION INTRAMUSCULAR; INTRAVENOUS at 15:35

## 2023-01-01 RX ADMIN — Medication 5: at 05:10

## 2023-01-01 RX ADMIN — ATORVASTATIN CALCIUM 10 MILLIGRAM(S): 80 TABLET, FILM COATED ORAL at 23:18

## 2023-01-01 RX ADMIN — ATORVASTATIN CALCIUM 40 MILLIGRAM(S): 80 TABLET, FILM COATED ORAL at 22:18

## 2023-01-01 RX ADMIN — Medication 1 APPLICATION(S): at 11:32

## 2023-01-01 RX ADMIN — CHLORHEXIDINE GLUCONATE 15 MILLILITER(S): 213 SOLUTION TOPICAL at 17:01

## 2023-01-01 RX ADMIN — NICARDIPINE HYDROCHLORIDE 12.5 MG/HR: 30 CAPSULE, EXTENDED RELEASE ORAL at 08:11

## 2023-01-01 RX ADMIN — CHLORHEXIDINE GLUCONATE 15 MILLILITER(S): 213 SOLUTION TOPICAL at 05:05

## 2023-01-01 RX ADMIN — Medication 300 MILLIGRAM(S): at 11:32

## 2023-01-01 RX ADMIN — LETROZOLE 2.5 MILLIGRAM(S): 2.5 TABLET, FILM COATED ORAL at 12:06

## 2023-01-01 RX ADMIN — Medication 2: at 05:34

## 2023-01-01 RX ADMIN — ATORVASTATIN CALCIUM 40 MILLIGRAM(S): 80 TABLET, FILM COATED ORAL at 22:55

## 2023-01-01 RX ADMIN — CARVEDILOL PHOSPHATE 37.5 MILLIGRAM(S): 80 CAPSULE, EXTENDED RELEASE ORAL at 17:28

## 2023-01-01 RX ADMIN — Medication 300 MILLIGRAM(S): at 12:36

## 2023-01-01 RX ADMIN — HUMAN INSULIN 11 UNIT(S): 100 INJECTION, SUSPENSION SUBCUTANEOUS at 23:51

## 2023-01-01 RX ADMIN — Medication 2: at 12:15

## 2023-01-01 RX ADMIN — Medication 81 MILLIGRAM(S): at 17:38

## 2023-01-01 RX ADMIN — ATORVASTATIN CALCIUM 40 MILLIGRAM(S): 80 TABLET, FILM COATED ORAL at 21:52

## 2023-01-01 RX ADMIN — Medication 4 DROP(S): at 18:25

## 2023-01-01 RX ADMIN — Medication 600 MILLIGRAM(S): at 21:38

## 2023-01-01 RX ADMIN — HEPARIN SODIUM 5000 UNIT(S): 5000 INJECTION INTRAVENOUS; SUBCUTANEOUS at 05:52

## 2023-01-01 RX ADMIN — HUMAN INSULIN 3 UNIT(S): 100 INJECTION, SUSPENSION SUBCUTANEOUS at 01:45

## 2023-01-01 RX ADMIN — DIPHENHYDRAMINE HYDROCHLORIDE AND LIDOCAINE HYDROCHLORIDE AND ALUMINUM HYDROXIDE AND MAGNESIUM HYDRO 10 MILLILITER(S): KIT at 07:02

## 2023-01-01 RX ADMIN — Medication 650 MILLIGRAM(S): at 23:18

## 2023-01-01 RX ADMIN — Medication 1 DROP(S): at 05:28

## 2023-01-01 RX ADMIN — Medication 2: at 00:44

## 2023-01-01 RX ADMIN — Medication 2: at 12:45

## 2023-01-01 RX ADMIN — PANTOPRAZOLE SODIUM 40 MILLIGRAM(S): 20 TABLET, DELAYED RELEASE ORAL at 18:46

## 2023-01-01 RX ADMIN — Medication 1 APPLICATION(S): at 05:05

## 2023-01-01 RX ADMIN — Medication 1 DROP(S): at 05:02

## 2023-01-01 RX ADMIN — SODIUM CHLORIDE 3 MILLILITER(S): 9 INJECTION INTRAMUSCULAR; INTRAVENOUS; SUBCUTANEOUS at 05:38

## 2023-01-01 RX ADMIN — Medication 3 MILLILITER(S): at 21:47

## 2023-01-01 RX ADMIN — DROXIDOPA 400 MILLIGRAM(S): 100 CAPSULE ORAL at 21:26

## 2023-01-01 RX ADMIN — Medication 50 MILLILITER(S): at 09:35

## 2023-01-01 RX ADMIN — MIDODRINE HYDROCHLORIDE 40 MILLIGRAM(S): 2.5 TABLET ORAL at 00:14

## 2023-01-01 RX ADMIN — MIDODRINE HYDROCHLORIDE 40 MILLIGRAM(S): 2.5 TABLET ORAL at 00:15

## 2023-01-01 RX ADMIN — Medication 650 MILLIGRAM(S): at 12:32

## 2023-01-01 RX ADMIN — Medication 1 DROP(S): at 05:07

## 2023-01-01 RX ADMIN — HUMAN INSULIN 4 UNIT(S): 100 INJECTION, SUSPENSION SUBCUTANEOUS at 00:27

## 2023-01-01 RX ADMIN — Medication 3 MILLILITER(S): at 04:44

## 2023-01-01 RX ADMIN — SODIUM CHLORIDE 4 MILLILITER(S): 9 INJECTION INTRAMUSCULAR; INTRAVENOUS; SUBCUTANEOUS at 16:45

## 2023-01-01 RX ADMIN — MIDODRINE HYDROCHLORIDE 30 MILLIGRAM(S): 2.5 TABLET ORAL at 13:36

## 2023-01-01 RX ADMIN — DEXMEDETOMIDINE HYDROCHLORIDE IN 0.9% SODIUM CHLORIDE 16.6 MICROGRAM(S)/KG/HR: 4 INJECTION INTRAVENOUS at 08:10

## 2023-01-01 RX ADMIN — Medication 9 UNIT(S): at 13:15

## 2023-01-01 RX ADMIN — DIPHENHYDRAMINE HYDROCHLORIDE AND LIDOCAINE HYDROCHLORIDE AND ALUMINUM HYDROXIDE AND MAGNESIUM HYDRO 10 MILLILITER(S): KIT at 12:14

## 2023-01-01 RX ADMIN — Medication 3 MILLILITER(S): at 07:27

## 2023-01-01 RX ADMIN — Medication 2: at 23:20

## 2023-01-01 RX ADMIN — DEXMEDETOMIDINE HYDROCHLORIDE IN 0.9% SODIUM CHLORIDE 1.66 MICROGRAM(S)/KG/HR: 4 INJECTION INTRAVENOUS at 07:28

## 2023-01-01 RX ADMIN — Medication 3 MILLILITER(S): at 16:32

## 2023-01-01 RX ADMIN — CHLORHEXIDINE GLUCONATE 15 MILLILITER(S): 213 SOLUTION TOPICAL at 05:55

## 2023-01-01 RX ADMIN — Medication 1 DROP(S): at 05:12

## 2023-01-01 RX ADMIN — PANTOPRAZOLE SODIUM 40 MILLIGRAM(S): 20 TABLET, DELAYED RELEASE ORAL at 18:37

## 2023-01-01 RX ADMIN — CHLORHEXIDINE GLUCONATE 1 APPLICATION(S): 213 SOLUTION TOPICAL at 06:55

## 2023-01-01 RX ADMIN — MIDAZOLAM HYDROCHLORIDE 2 MILLIGRAM(S): 1 INJECTION, SOLUTION INTRAMUSCULAR; INTRAVENOUS at 11:15

## 2023-01-01 RX ADMIN — Medication 1 APPLICATION(S): at 05:21

## 2023-01-01 RX ADMIN — Medication 300 MILLIGRAM(S): at 12:01

## 2023-01-01 RX ADMIN — Medication 1 DROP(S): at 05:49

## 2023-01-01 RX ADMIN — Medication 3 MILLILITER(S): at 05:10

## 2023-01-01 RX ADMIN — ALBUTEROL 2.5 MILLIGRAM(S): 90 AEROSOL, METERED ORAL at 16:16

## 2023-01-01 RX ADMIN — HUMAN INSULIN 6 UNIT(S): 100 INJECTION, SUSPENSION SUBCUTANEOUS at 05:43

## 2023-01-01 RX ADMIN — BUMETANIDE 1 MILLIGRAM(S): 0.25 INJECTION INTRAMUSCULAR; INTRAVENOUS at 11:50

## 2023-01-01 RX ADMIN — Medication 3 MILLILITER(S): at 04:10

## 2023-01-01 RX ADMIN — Medication 600 MILLIGRAM(S): at 20:28

## 2023-01-01 RX ADMIN — SODIUM CHLORIDE 1 GRAM(S): 9 INJECTION INTRAMUSCULAR; INTRAVENOUS; SUBCUTANEOUS at 06:21

## 2023-01-01 RX ADMIN — Medication 2 MILLIGRAM(S): at 16:02

## 2023-01-01 RX ADMIN — Medication 3 MILLIGRAM(S): at 21:09

## 2023-01-01 RX ADMIN — DROXIDOPA 600 MILLIGRAM(S): 100 CAPSULE ORAL at 01:01

## 2023-01-01 RX ADMIN — MIDODRINE HYDROCHLORIDE 40 MILLIGRAM(S): 2.5 TABLET ORAL at 05:09

## 2023-01-01 RX ADMIN — HUMAN INSULIN 6 UNIT(S): 100 INJECTION, SUSPENSION SUBCUTANEOUS at 23:05

## 2023-01-01 RX ADMIN — MEROPENEM 100 MILLIGRAM(S): 1 INJECTION INTRAVENOUS at 17:11

## 2023-01-01 RX ADMIN — Medication 1 DROP(S): at 21:34

## 2023-01-01 RX ADMIN — PANTOPRAZOLE SODIUM 40 MILLIGRAM(S): 20 TABLET, DELAYED RELEASE ORAL at 05:52

## 2023-01-01 RX ADMIN — Medication 1 APPLICATION(S): at 06:27

## 2023-01-01 RX ADMIN — Medication 1 APPLICATION(S): at 11:57

## 2023-01-01 RX ADMIN — Medication 1 TABLET(S): at 12:19

## 2023-01-01 RX ADMIN — Medication 0.5 MILLIGRAM(S): at 06:29

## 2023-01-01 RX ADMIN — HEPARIN SODIUM 5000 UNIT(S): 5000 INJECTION INTRAVENOUS; SUBCUTANEOUS at 22:54

## 2023-01-01 RX ADMIN — DROXIDOPA 600 MILLIGRAM(S): 100 CAPSULE ORAL at 18:42

## 2023-01-01 RX ADMIN — Medication 3 MILLILITER(S): at 18:16

## 2023-01-01 RX ADMIN — Medication 1 APPLICATION(S): at 11:40

## 2023-01-01 RX ADMIN — Medication 50 MILLIEQUIVALENT(S): at 22:44

## 2023-01-01 RX ADMIN — SODIUM CHLORIDE 4 MILLILITER(S): 9 INJECTION INTRAMUSCULAR; INTRAVENOUS; SUBCUTANEOUS at 16:02

## 2023-01-01 RX ADMIN — Medication 1 SPRAY(S): at 21:45

## 2023-01-01 RX ADMIN — Medication 20 MILLIEQUIVALENT(S): at 11:17

## 2023-01-01 RX ADMIN — PANTOPRAZOLE SODIUM 40 MILLIGRAM(S): 20 TABLET, DELAYED RELEASE ORAL at 06:38

## 2023-01-01 RX ADMIN — Medication 1 APPLICATION(S): at 11:46

## 2023-01-01 RX ADMIN — Medication 1 DROP(S): at 02:18

## 2023-01-01 RX ADMIN — CALAMINE AND ZINC OXIDE AND PHENOL 1 APPLICATION(S): 160; 10 LOTION TOPICAL at 11:18

## 2023-01-01 RX ADMIN — ALBUTEROL 2.5 MILLIGRAM(S): 90 AEROSOL, METERED ORAL at 03:51

## 2023-01-01 RX ADMIN — Medication 650 MILLIGRAM(S): at 21:03

## 2023-01-01 RX ADMIN — MIDODRINE HYDROCHLORIDE 40 MILLIGRAM(S): 2.5 TABLET ORAL at 00:23

## 2023-01-01 RX ADMIN — HUMAN INSULIN 6 UNIT(S): 100 INJECTION, SUSPENSION SUBCUTANEOUS at 18:19

## 2023-01-01 RX ADMIN — HEPARIN SODIUM 5000 UNIT(S): 5000 INJECTION INTRAVENOUS; SUBCUTANEOUS at 21:18

## 2023-01-01 RX ADMIN — Medication 4 DROP(S): at 07:02

## 2023-01-01 RX ADMIN — Medication 1 DROP(S): at 02:23

## 2023-01-01 RX ADMIN — MIDODRINE HYDROCHLORIDE 40 MILLIGRAM(S): 2.5 TABLET ORAL at 17:14

## 2023-01-01 RX ADMIN — SODIUM CHLORIDE 4 MILLILITER(S): 9 INJECTION INTRAMUSCULAR; INTRAVENOUS; SUBCUTANEOUS at 16:25

## 2023-01-01 RX ADMIN — Medication 4: at 00:25

## 2023-01-01 RX ADMIN — Medication 6: at 17:35

## 2023-01-01 RX ADMIN — Medication 50 MILLILITER(S): at 19:08

## 2023-01-01 RX ADMIN — Medication 3 MILLILITER(S): at 22:21

## 2023-01-01 RX ADMIN — Medication 1 APPLICATION(S): at 06:15

## 2023-01-01 RX ADMIN — Medication 3 MILLILITER(S): at 09:51

## 2023-01-01 RX ADMIN — HUMAN INSULIN 1 UNIT(S): 100 INJECTION, SUSPENSION SUBCUTANEOUS at 11:34

## 2023-01-01 RX ADMIN — MIDODRINE HYDROCHLORIDE 10 MILLIGRAM(S): 2.5 TABLET ORAL at 22:26

## 2023-01-01 RX ADMIN — Medication 2: at 11:36

## 2023-01-01 RX ADMIN — Medication 650 MILLIGRAM(S): at 11:00

## 2023-01-01 RX ADMIN — Medication 4: at 05:48

## 2023-01-01 RX ADMIN — HUMAN INSULIN 6 UNIT(S): 100 INJECTION, SUSPENSION SUBCUTANEOUS at 05:40

## 2023-01-01 RX ADMIN — POLYMYXIN B SULFATE 250 UNIT(S): 500000 INJECTION, POWDER, LYOPHILIZED, FOR SOLUTION INTRAMUSCULAR; INTRATHECAL; INTRAVENOUS; OPHTHALMIC at 05:49

## 2023-01-01 RX ADMIN — Medication 650 MILLIGRAM(S): at 09:30

## 2023-01-01 RX ADMIN — SODIUM CHLORIDE 1 GRAM(S): 9 INJECTION INTRAMUSCULAR; INTRAVENOUS; SUBCUTANEOUS at 06:13

## 2023-01-01 RX ADMIN — Medication 1 DROP(S): at 03:16

## 2023-01-01 RX ADMIN — HUMAN INSULIN 6 UNIT(S): 100 INJECTION, SUSPENSION SUBCUTANEOUS at 11:55

## 2023-01-01 RX ADMIN — Medication 650 MILLIGRAM(S): at 10:21

## 2023-01-01 RX ADMIN — Medication 6 MILLIGRAM(S): at 08:39

## 2023-01-01 RX ADMIN — SODIUM CHLORIDE 4 MILLILITER(S): 9 INJECTION INTRAMUSCULAR; INTRAVENOUS; SUBCUTANEOUS at 15:41

## 2023-01-01 RX ADMIN — Medication 4: at 17:39

## 2023-01-01 RX ADMIN — MEROPENEM 100 MILLIGRAM(S): 1 INJECTION INTRAVENOUS at 05:23

## 2023-01-01 RX ADMIN — Medication 1 DROP(S): at 23:51

## 2023-01-01 RX ADMIN — SODIUM CHLORIDE 4 MILLILITER(S): 9 INJECTION INTRAMUSCULAR; INTRAVENOUS; SUBCUTANEOUS at 22:28

## 2023-01-01 RX ADMIN — Medication 1 DROP(S): at 10:33

## 2023-01-01 RX ADMIN — HUMAN INSULIN 5 UNIT(S): 100 INJECTION, SUSPENSION SUBCUTANEOUS at 23:39

## 2023-01-01 RX ADMIN — CHLORHEXIDINE GLUCONATE 1 APPLICATION(S): 213 SOLUTION TOPICAL at 06:13

## 2023-01-01 RX ADMIN — LEVETIRACETAM 1000 MILLIGRAM(S): 250 TABLET, FILM COATED ORAL at 11:22

## 2023-01-01 RX ADMIN — SENNA PLUS 10 MILLILITER(S): 8.6 TABLET ORAL at 11:47

## 2023-01-01 RX ADMIN — HUMAN INSULIN 11 UNIT(S): 100 INJECTION, SUSPENSION SUBCUTANEOUS at 17:20

## 2023-01-01 RX ADMIN — ALBUTEROL 2.5 MILLIGRAM(S): 90 AEROSOL, METERED ORAL at 16:39

## 2023-01-01 RX ADMIN — Medication 1 APPLICATION(S): at 05:46

## 2023-01-01 RX ADMIN — Medication 1 APPLICATION(S): at 18:52

## 2023-01-01 RX ADMIN — Medication 50 MILLILITER(S): at 23:38

## 2023-01-01 RX ADMIN — Medication 10: at 18:28

## 2023-01-01 RX ADMIN — Medication 6 MICROGRAM(S)/MIN: at 08:46

## 2023-01-01 RX ADMIN — Medication 200 MILLIGRAM(S): at 14:07

## 2023-01-01 RX ADMIN — Medication 3 MILLILITER(S): at 09:42

## 2023-01-01 RX ADMIN — HEPARIN SODIUM 1000 UNIT(S)/HR: 5000 INJECTION INTRAVENOUS; SUBCUTANEOUS at 10:00

## 2023-01-01 RX ADMIN — BUMETANIDE 2 MILLIGRAM(S): 0.25 INJECTION INTRAMUSCULAR; INTRAVENOUS at 05:24

## 2023-01-01 RX ADMIN — Medication 4 DROP(S): at 18:05

## 2023-01-01 RX ADMIN — APIXABAN 5 MILLIGRAM(S): 2.5 TABLET, FILM COATED ORAL at 20:29

## 2023-01-01 RX ADMIN — HUMAN INSULIN 3 UNIT(S): 100 INJECTION, SUSPENSION SUBCUTANEOUS at 00:03

## 2023-01-01 RX ADMIN — Medication 81 MILLIGRAM(S): at 12:09

## 2023-01-01 RX ADMIN — MIDODRINE HYDROCHLORIDE 10 MILLIGRAM(S): 2.5 TABLET ORAL at 14:31

## 2023-01-01 RX ADMIN — Medication 2: at 05:54

## 2023-01-01 RX ADMIN — SODIUM CHLORIDE 3 MILLILITER(S): 9 INJECTION INTRAMUSCULAR; INTRAVENOUS; SUBCUTANEOUS at 06:23

## 2023-01-01 RX ADMIN — LEVETIRACETAM 1000 MILLIGRAM(S): 250 TABLET, FILM COATED ORAL at 12:00

## 2023-01-01 RX ADMIN — MIDODRINE HYDROCHLORIDE 30 MILLIGRAM(S): 2.5 TABLET ORAL at 13:01

## 2023-01-01 RX ADMIN — MIDODRINE HYDROCHLORIDE 30 MILLIGRAM(S): 2.5 TABLET ORAL at 05:40

## 2023-01-01 RX ADMIN — Medication 1 SPRAY(S): at 06:24

## 2023-01-01 RX ADMIN — PANTOPRAZOLE SODIUM 40 MILLIGRAM(S): 20 TABLET, DELAYED RELEASE ORAL at 17:42

## 2023-01-01 RX ADMIN — SODIUM CHLORIDE 1 GRAM(S): 9 INJECTION INTRAMUSCULAR; INTRAVENOUS; SUBCUTANEOUS at 17:53

## 2023-01-01 RX ADMIN — SODIUM CHLORIDE 4 MILLILITER(S): 9 INJECTION INTRAMUSCULAR; INTRAVENOUS; SUBCUTANEOUS at 10:50

## 2023-01-01 RX ADMIN — HUMAN INSULIN 16 UNIT(S): 100 INJECTION, SUSPENSION SUBCUTANEOUS at 17:28

## 2023-01-01 RX ADMIN — SODIUM CHLORIDE 4 MILLILITER(S): 9 INJECTION INTRAMUSCULAR; INTRAVENOUS; SUBCUTANEOUS at 15:49

## 2023-01-01 RX ADMIN — MIDODRINE HYDROCHLORIDE 40 MILLIGRAM(S): 2.5 TABLET ORAL at 12:30

## 2023-01-01 RX ADMIN — ALBUTEROL 2.5 MILLIGRAM(S): 90 AEROSOL, METERED ORAL at 22:52

## 2023-01-01 RX ADMIN — Medication 300 MILLIGRAM(S): at 11:55

## 2023-01-01 RX ADMIN — DROXIDOPA 600 MILLIGRAM(S): 100 CAPSULE ORAL at 17:13

## 2023-01-01 RX ADMIN — Medication 1 DROP(S): at 12:15

## 2023-01-01 RX ADMIN — Medication 6: at 11:50

## 2023-01-01 RX ADMIN — HEPARIN SODIUM 9.5 UNIT(S)/HR: 5000 INJECTION INTRAVENOUS; SUBCUTANEOUS at 09:51

## 2023-01-01 RX ADMIN — MIDODRINE HYDROCHLORIDE 30 MILLIGRAM(S): 2.5 TABLET ORAL at 05:06

## 2023-01-01 RX ADMIN — Medication 6.23 MICROGRAM(S)/KG/MIN: at 12:34

## 2023-01-01 RX ADMIN — Medication 2: at 06:47

## 2023-01-01 RX ADMIN — Medication 3 MILLILITER(S): at 05:27

## 2023-01-01 RX ADMIN — Medication 1 APPLICATION(S): at 00:16

## 2023-01-01 RX ADMIN — SODIUM CHLORIDE 1 GRAM(S): 9 INJECTION INTRAMUSCULAR; INTRAVENOUS; SUBCUTANEOUS at 06:06

## 2023-01-01 RX ADMIN — SODIUM CHLORIDE 4 MILLILITER(S): 9 INJECTION INTRAMUSCULAR; INTRAVENOUS; SUBCUTANEOUS at 08:35

## 2023-01-01 RX ADMIN — NICARDIPINE HYDROCHLORIDE 12.5 MG/HR: 30 CAPSULE, EXTENDED RELEASE ORAL at 22:18

## 2023-01-01 RX ADMIN — Medication 3 MILLILITER(S): at 15:25

## 2023-01-01 RX ADMIN — Medication 6: at 12:36

## 2023-01-01 RX ADMIN — DROXIDOPA 600 MILLIGRAM(S): 100 CAPSULE ORAL at 17:22

## 2023-01-01 RX ADMIN — HUMAN INSULIN 2 UNIT(S): 100 INJECTION, SUSPENSION SUBCUTANEOUS at 23:08

## 2023-01-01 RX ADMIN — Medication 2000 UNIT(S): at 11:50

## 2023-01-01 RX ADMIN — SODIUM CHLORIDE 3 MILLILITER(S): 9 INJECTION INTRAMUSCULAR; INTRAVENOUS; SUBCUTANEOUS at 17:50

## 2023-01-01 RX ADMIN — Medication 2: at 12:43

## 2023-01-01 RX ADMIN — Medication 6 MICROGRAM(S)/MIN: at 14:21

## 2023-01-01 RX ADMIN — MEROPENEM 100 MILLIGRAM(S): 1 INJECTION INTRAVENOUS at 09:47

## 2023-01-01 RX ADMIN — HEPARIN SODIUM 10 UNIT(S)/HR: 5000 INJECTION INTRAVENOUS; SUBCUTANEOUS at 21:50

## 2023-01-01 RX ADMIN — Medication 2: at 23:53

## 2023-01-01 RX ADMIN — CHLORHEXIDINE GLUCONATE 1 APPLICATION(S): 213 SOLUTION TOPICAL at 05:03

## 2023-01-01 RX ADMIN — Medication 10 UNIT(S): at 11:48

## 2023-01-01 RX ADMIN — Medication 4: at 11:18

## 2023-01-01 RX ADMIN — SODIUM CHLORIDE 4 MILLILITER(S): 9 INJECTION INTRAMUSCULAR; INTRAVENOUS; SUBCUTANEOUS at 22:04

## 2023-01-01 RX ADMIN — Medication 6: at 18:17

## 2023-01-01 RX ADMIN — BUMETANIDE 2 MILLIGRAM(S): 0.25 INJECTION INTRAMUSCULAR; INTRAVENOUS at 13:56

## 2023-01-01 RX ADMIN — Medication 3 MILLILITER(S): at 22:32

## 2023-01-01 RX ADMIN — DIPHENHYDRAMINE HYDROCHLORIDE AND LIDOCAINE HYDROCHLORIDE AND ALUMINUM HYDROXIDE AND MAGNESIUM HYDRO 10 MILLILITER(S): KIT at 11:30

## 2023-01-01 RX ADMIN — Medication 1 DROP(S): at 02:44

## 2023-01-01 RX ADMIN — HUMAN INSULIN 6 UNIT(S): 100 INJECTION, SUSPENSION SUBCUTANEOUS at 01:13

## 2023-01-01 RX ADMIN — APIXABAN 5 MILLIGRAM(S): 2.5 TABLET, FILM COATED ORAL at 17:24

## 2023-01-01 RX ADMIN — Medication 4 DROP(S): at 18:32

## 2023-01-01 RX ADMIN — Medication 3 MILLILITER(S): at 21:33

## 2023-01-01 RX ADMIN — DROXIDOPA 100 MILLIGRAM(S): 100 CAPSULE ORAL at 10:21

## 2023-01-01 RX ADMIN — Medication 1 APPLICATION(S): at 11:19

## 2023-01-01 RX ADMIN — Medication 1 DROP(S): at 11:24

## 2023-01-01 RX ADMIN — Medication 1 APPLICATION(S): at 17:15

## 2023-01-01 RX ADMIN — ATORVASTATIN CALCIUM 10 MILLIGRAM(S): 80 TABLET, FILM COATED ORAL at 21:35

## 2023-01-01 RX ADMIN — ATORVASTATIN CALCIUM 10 MILLIGRAM(S): 80 TABLET, FILM COATED ORAL at 21:33

## 2023-01-01 RX ADMIN — Medication 6 MILLIGRAM(S): at 09:23

## 2023-01-01 RX ADMIN — Medication 1 DROP(S): at 02:39

## 2023-01-01 RX ADMIN — ALBUTEROL 2.5 MILLIGRAM(S): 90 AEROSOL, METERED ORAL at 16:48

## 2023-01-01 RX ADMIN — Medication 1 DROP(S): at 12:07

## 2023-01-01 RX ADMIN — NICARDIPINE HYDROCHLORIDE 25 MG/HR: 30 CAPSULE, EXTENDED RELEASE ORAL at 05:51

## 2023-01-01 RX ADMIN — DROXIDOPA 600 MILLIGRAM(S): 100 CAPSULE ORAL at 09:41

## 2023-01-01 RX ADMIN — SEVELAMER CARBONATE 800 MILLIGRAM(S): 2400 POWDER, FOR SUSPENSION ORAL at 13:05

## 2023-01-01 RX ADMIN — APIXABAN 5 MILLIGRAM(S): 2.5 TABLET, FILM COATED ORAL at 05:34

## 2023-01-01 RX ADMIN — Medication 1 DROP(S): at 20:10

## 2023-01-01 RX ADMIN — Medication 81 MILLIGRAM(S): at 12:02

## 2023-01-01 RX ADMIN — Medication 81 MILLIGRAM(S): at 12:17

## 2023-01-01 RX ADMIN — Medication 1 DROP(S): at 23:40

## 2023-01-01 RX ADMIN — INSULIN HUMAN 14 UNIT(S): 100 INJECTION, SOLUTION SUBCUTANEOUS at 19:39

## 2023-01-01 RX ADMIN — PANTOPRAZOLE SODIUM 40 MILLIGRAM(S): 20 TABLET, DELAYED RELEASE ORAL at 18:35

## 2023-01-01 RX ADMIN — CHLORHEXIDINE GLUCONATE 15 MILLILITER(S): 213 SOLUTION TOPICAL at 05:02

## 2023-01-01 RX ADMIN — Medication 1 DROP(S): at 13:58

## 2023-01-01 RX ADMIN — DIPHENHYDRAMINE HYDROCHLORIDE AND LIDOCAINE HYDROCHLORIDE AND ALUMINUM HYDROXIDE AND MAGNESIUM HYDRO 10 MILLILITER(S): KIT at 00:00

## 2023-01-01 RX ADMIN — Medication 1 DROP(S): at 02:13

## 2023-01-01 RX ADMIN — ALBUTEROL 2.5 MILLIGRAM(S): 90 AEROSOL, METERED ORAL at 08:42

## 2023-01-01 RX ADMIN — HEPARIN SODIUM 9 UNIT(S)/HR: 5000 INJECTION INTRAVENOUS; SUBCUTANEOUS at 06:43

## 2023-01-01 RX ADMIN — HUMAN INSULIN 5 UNIT(S): 100 INJECTION, SUSPENSION SUBCUTANEOUS at 05:14

## 2023-01-01 RX ADMIN — SODIUM CHLORIDE 4 MILLILITER(S): 9 INJECTION INTRAMUSCULAR; INTRAVENOUS; SUBCUTANEOUS at 03:09

## 2023-01-01 RX ADMIN — HUMAN INSULIN 6 UNIT(S): 100 INJECTION, SUSPENSION SUBCUTANEOUS at 00:12

## 2023-01-01 RX ADMIN — Medication 3 MILLILITER(S): at 03:14

## 2023-01-01 RX ADMIN — SODIUM CHLORIDE 2 GRAM(S): 9 INJECTION INTRAMUSCULAR; INTRAVENOUS; SUBCUTANEOUS at 17:29

## 2023-01-01 RX ADMIN — Medication 100 MILLIGRAM(S): at 21:49

## 2023-01-01 RX ADMIN — FENTANYL CITRATE 3.76 MICROGRAM(S)/KG/HR: 50 INJECTION INTRAVENOUS at 07:49

## 2023-01-01 RX ADMIN — Medication 1 APPLICATION(S): at 17:53

## 2023-01-01 RX ADMIN — MIDODRINE HYDROCHLORIDE 30 MILLIGRAM(S): 2.5 TABLET ORAL at 05:30

## 2023-01-01 RX ADMIN — Medication 3 MILLILITER(S): at 21:27

## 2023-01-01 RX ADMIN — HEPARIN SODIUM 8.5 UNIT(S)/HR: 5000 INJECTION INTRAVENOUS; SUBCUTANEOUS at 19:20

## 2023-01-01 RX ADMIN — Medication 1 APPLICATION(S): at 00:29

## 2023-01-01 RX ADMIN — Medication 300 MILLIGRAM(S): at 11:35

## 2023-01-01 RX ADMIN — MIDODRINE HYDROCHLORIDE 20 MILLIGRAM(S): 2.5 TABLET ORAL at 00:17

## 2023-01-01 RX ADMIN — Medication 3 MILLILITER(S): at 09:48

## 2023-01-01 RX ADMIN — Medication 1 SPRAY(S): at 06:28

## 2023-01-01 RX ADMIN — CHLORHEXIDINE GLUCONATE 15 MILLILITER(S): 213 SOLUTION TOPICAL at 06:26

## 2023-01-01 RX ADMIN — Medication 650 MILLIGRAM(S): at 02:40

## 2023-01-01 RX ADMIN — Medication 4 DROP(S): at 17:31

## 2023-01-01 RX ADMIN — SODIUM CHLORIDE 4 MILLILITER(S): 9 INJECTION INTRAMUSCULAR; INTRAVENOUS; SUBCUTANEOUS at 04:53

## 2023-01-01 RX ADMIN — Medication 1 DROP(S): at 17:19

## 2023-01-01 RX ADMIN — PANTOPRAZOLE SODIUM 40 MILLIGRAM(S): 20 TABLET, DELAYED RELEASE ORAL at 05:42

## 2023-01-01 RX ADMIN — Medication 1 DROP(S): at 21:22

## 2023-01-01 RX ADMIN — Medication 6.23 MICROGRAM(S)/KG/MIN: at 08:26

## 2023-01-01 RX ADMIN — Medication 650 MILLIGRAM(S): at 18:45

## 2023-01-01 RX ADMIN — Medication 3 MILLILITER(S): at 03:10

## 2023-01-01 RX ADMIN — Medication 25 MILLIGRAM(S): at 22:00

## 2023-01-01 RX ADMIN — Medication 1 DROP(S): at 00:31

## 2023-01-01 RX ADMIN — MIDODRINE HYDROCHLORIDE 30 MILLIGRAM(S): 2.5 TABLET ORAL at 06:52

## 2023-01-01 RX ADMIN — Medication 3 MILLILITER(S): at 00:29

## 2023-01-01 RX ADMIN — SEVELAMER CARBONATE 800 MILLIGRAM(S): 2400 POWDER, FOR SUSPENSION ORAL at 05:07

## 2023-01-01 RX ADMIN — DROXIDOPA 600 MILLIGRAM(S): 100 CAPSULE ORAL at 10:39

## 2023-01-01 RX ADMIN — HUMAN INSULIN 6 UNIT(S): 100 INJECTION, SUSPENSION SUBCUTANEOUS at 06:03

## 2023-01-01 RX ADMIN — SODIUM CHLORIDE 4 MILLILITER(S): 9 INJECTION INTRAMUSCULAR; INTRAVENOUS; SUBCUTANEOUS at 03:44

## 2023-01-01 RX ADMIN — Medication 4: at 18:02

## 2023-01-01 RX ADMIN — ALBUTEROL 2.5 MILLIGRAM(S): 90 AEROSOL, METERED ORAL at 09:10

## 2023-01-01 RX ADMIN — Medication 100 MILLIGRAM(S): at 05:29

## 2023-01-01 RX ADMIN — LETROZOLE 2.5 MILLIGRAM(S): 2.5 TABLET, FILM COATED ORAL at 13:00

## 2023-01-01 RX ADMIN — PANTOPRAZOLE SODIUM 40 MILLIGRAM(S): 20 TABLET, DELAYED RELEASE ORAL at 06:19

## 2023-01-01 RX ADMIN — Medication 40 MILLIGRAM(S): at 06:36

## 2023-01-01 RX ADMIN — Medication 1 APPLICATION(S): at 23:44

## 2023-01-01 RX ADMIN — Medication 1 APPLICATION(S): at 17:13

## 2023-01-01 RX ADMIN — Medication 650 MILLIGRAM(S): at 00:45

## 2023-01-01 RX ADMIN — APIXABAN 5 MILLIGRAM(S): 2.5 TABLET, FILM COATED ORAL at 05:31

## 2023-01-01 RX ADMIN — DEXMEDETOMIDINE HYDROCHLORIDE IN 0.9% SODIUM CHLORIDE 1.66 MICROGRAM(S)/KG/HR: 4 INJECTION INTRAVENOUS at 07:36

## 2023-01-01 RX ADMIN — Medication 1 DROP(S): at 01:58

## 2023-01-01 RX ADMIN — SODIUM CHLORIDE 4 MILLILITER(S): 9 INJECTION INTRAMUSCULAR; INTRAVENOUS; SUBCUTANEOUS at 14:59

## 2023-01-01 RX ADMIN — SODIUM CHLORIDE 2 GRAM(S): 9 INJECTION INTRAMUSCULAR; INTRAVENOUS; SUBCUTANEOUS at 05:41

## 2023-01-01 RX ADMIN — HUMAN INSULIN 2 UNIT(S): 100 INJECTION, SUSPENSION SUBCUTANEOUS at 17:57

## 2023-01-01 RX ADMIN — Medication 3 MILLILITER(S): at 08:39

## 2023-01-01 RX ADMIN — SODIUM CHLORIDE 2 GRAM(S): 9 INJECTION INTRAMUSCULAR; INTRAVENOUS; SUBCUTANEOUS at 05:50

## 2023-01-01 RX ADMIN — Medication 1 DROP(S): at 09:01

## 2023-01-01 RX ADMIN — MIDODRINE HYDROCHLORIDE 40 MILLIGRAM(S): 2.5 TABLET ORAL at 05:42

## 2023-01-01 RX ADMIN — Medication 25 MILLIGRAM(S): at 06:01

## 2023-01-01 RX ADMIN — Medication 7.48 MICROGRAM(S)/KG/MIN: at 11:27

## 2023-01-01 RX ADMIN — LEVETIRACETAM 1000 MILLIGRAM(S): 250 TABLET, FILM COATED ORAL at 11:15

## 2023-01-01 RX ADMIN — DIPHENHYDRAMINE HYDROCHLORIDE AND LIDOCAINE HYDROCHLORIDE AND ALUMINUM HYDROXIDE AND MAGNESIUM HYDRO 10 MILLILITER(S): KIT at 12:15

## 2023-01-01 RX ADMIN — Medication 1 DROP(S): at 03:02

## 2023-01-01 RX ADMIN — Medication 100 MILLIEQUIVALENT(S): at 05:45

## 2023-01-01 RX ADMIN — Medication 6: at 05:31

## 2023-01-01 RX ADMIN — Medication 1 DROP(S): at 08:20

## 2023-01-01 RX ADMIN — LEVETIRACETAM 250 MILLIGRAM(S): 250 TABLET, FILM COATED ORAL at 20:47

## 2023-01-01 RX ADMIN — Medication 1000 MILLIGRAM(S): at 03:22

## 2023-01-01 RX ADMIN — PANTOPRAZOLE SODIUM 40 MILLIGRAM(S): 20 TABLET, DELAYED RELEASE ORAL at 16:22

## 2023-01-01 RX ADMIN — HUMAN INSULIN 3 UNIT(S): 100 INJECTION, SUSPENSION SUBCUTANEOUS at 07:13

## 2023-01-01 RX ADMIN — Medication 2: at 00:22

## 2023-01-01 RX ADMIN — Medication 6.23 MICROGRAM(S)/KG/MIN: at 05:04

## 2023-01-01 RX ADMIN — SODIUM CHLORIDE 1 GRAM(S): 9 INJECTION INTRAMUSCULAR; INTRAVENOUS; SUBCUTANEOUS at 17:57

## 2023-01-01 RX ADMIN — Medication 1000 MILLIGRAM(S): at 05:23

## 2023-01-01 RX ADMIN — BUMETANIDE 2.5 MG/HR: 0.25 INJECTION INTRAMUSCULAR; INTRAVENOUS at 19:28

## 2023-01-01 RX ADMIN — FENTANYL CITRATE 100 MICROGRAM(S): 50 INJECTION INTRAVENOUS at 04:55

## 2023-01-01 RX ADMIN — ATORVASTATIN CALCIUM 10 MILLIGRAM(S): 80 TABLET, FILM COATED ORAL at 21:57

## 2023-01-01 RX ADMIN — Medication 10: at 11:39

## 2023-01-01 RX ADMIN — HUMAN INSULIN 11 UNIT(S): 100 INJECTION, SUSPENSION SUBCUTANEOUS at 05:47

## 2023-01-01 RX ADMIN — APIXABAN 5 MILLIGRAM(S): 2.5 TABLET, FILM COATED ORAL at 18:22

## 2023-01-01 RX ADMIN — Medication 3 MILLILITER(S): at 20:49

## 2023-01-01 RX ADMIN — MIDODRINE HYDROCHLORIDE 20 MILLIGRAM(S): 2.5 TABLET ORAL at 11:27

## 2023-01-01 RX ADMIN — HEPARIN SODIUM 5000 UNIT(S): 5000 INJECTION INTRAVENOUS; SUBCUTANEOUS at 05:26

## 2023-01-01 RX ADMIN — HUMAN INSULIN 6 UNIT(S): 100 INJECTION, SUSPENSION SUBCUTANEOUS at 11:30

## 2023-01-01 RX ADMIN — DROXIDOPA 600 MILLIGRAM(S): 100 CAPSULE ORAL at 18:09

## 2023-01-01 RX ADMIN — Medication 0.5 MILLIGRAM(S): at 17:28

## 2023-01-01 RX ADMIN — Medication 1 APPLICATION(S): at 14:54

## 2023-01-01 RX ADMIN — SODIUM CHLORIDE 4 MILLILITER(S): 9 INJECTION INTRAMUSCULAR; INTRAVENOUS; SUBCUTANEOUS at 09:58

## 2023-01-01 RX ADMIN — LETROZOLE 2.5 MILLIGRAM(S): 2.5 TABLET, FILM COATED ORAL at 12:19

## 2023-01-01 RX ADMIN — MIDODRINE HYDROCHLORIDE 40 MILLIGRAM(S): 2.5 TABLET ORAL at 11:47

## 2023-01-01 RX ADMIN — SODIUM CHLORIDE 4 MILLILITER(S): 9 INJECTION INTRAMUSCULAR; INTRAVENOUS; SUBCUTANEOUS at 16:18

## 2023-01-01 RX ADMIN — MIDODRINE HYDROCHLORIDE 20 MILLIGRAM(S): 2.5 TABLET ORAL at 11:51

## 2023-01-01 RX ADMIN — Medication 3 MILLILITER(S): at 21:00

## 2023-01-01 RX ADMIN — Medication 4: at 00:53

## 2023-01-01 RX ADMIN — HEPARIN SODIUM 5000 UNIT(S): 5000 INJECTION INTRAVENOUS; SUBCUTANEOUS at 13:20

## 2023-01-01 RX ADMIN — Medication 8: at 11:47

## 2023-01-01 RX ADMIN — Medication 1 DROP(S): at 21:39

## 2023-01-01 RX ADMIN — CHLORHEXIDINE GLUCONATE 1 APPLICATION(S): 213 SOLUTION TOPICAL at 05:46

## 2023-01-01 RX ADMIN — CHLORHEXIDINE GLUCONATE 1 APPLICATION(S): 213 SOLUTION TOPICAL at 05:55

## 2023-01-01 RX ADMIN — Medication 1 APPLICATION(S): at 23:58

## 2023-01-01 RX ADMIN — HEPARIN SODIUM 9.5 UNIT(S)/HR: 5000 INJECTION INTRAVENOUS; SUBCUTANEOUS at 07:47

## 2023-01-01 RX ADMIN — MEROPENEM 100 MILLIGRAM(S): 1 INJECTION INTRAVENOUS at 09:08

## 2023-01-01 RX ADMIN — HUMAN INSULIN 6 UNIT(S): 100 INJECTION, SUSPENSION SUBCUTANEOUS at 05:44

## 2023-01-01 RX ADMIN — ATORVASTATIN CALCIUM 40 MILLIGRAM(S): 80 TABLET, FILM COATED ORAL at 20:49

## 2023-01-01 RX ADMIN — ATORVASTATIN CALCIUM 10 MILLIGRAM(S): 80 TABLET, FILM COATED ORAL at 21:27

## 2023-01-01 RX ADMIN — Medication 11.3 MICROGRAM(S)/KG/MIN: at 17:53

## 2023-01-01 RX ADMIN — Medication 2: at 23:06

## 2023-01-01 RX ADMIN — FENTANYL CITRATE 100 MICROGRAM(S): 50 INJECTION INTRAVENOUS at 02:10

## 2023-01-01 RX ADMIN — CHLORHEXIDINE GLUCONATE 15 MILLILITER(S): 213 SOLUTION TOPICAL at 17:56

## 2023-01-01 RX ADMIN — Medication 1 APPLICATION(S): at 12:33

## 2023-01-01 RX ADMIN — ATORVASTATIN CALCIUM 10 MILLIGRAM(S): 80 TABLET, FILM COATED ORAL at 21:02

## 2023-01-01 RX ADMIN — HEPARIN SODIUM 5000 UNIT(S): 5000 INJECTION INTRAVENOUS; SUBCUTANEOUS at 05:41

## 2023-01-01 RX ADMIN — SODIUM CHLORIDE 4 MILLILITER(S): 9 INJECTION INTRAMUSCULAR; INTRAVENOUS; SUBCUTANEOUS at 15:56

## 2023-01-01 RX ADMIN — CHLORHEXIDINE GLUCONATE 1 APPLICATION(S): 213 SOLUTION TOPICAL at 11:32

## 2023-01-01 RX ADMIN — CHLORHEXIDINE GLUCONATE 15 MILLILITER(S): 213 SOLUTION TOPICAL at 18:09

## 2023-01-01 RX ADMIN — Medication 1 APPLICATION(S): at 00:07

## 2023-01-01 RX ADMIN — Medication 300 MILLIGRAM(S): at 11:15

## 2023-01-01 RX ADMIN — APIXABAN 5 MILLIGRAM(S): 2.5 TABLET, FILM COATED ORAL at 18:09

## 2023-01-01 RX ADMIN — DROXIDOPA 100 MILLIGRAM(S): 100 CAPSULE ORAL at 05:03

## 2023-01-01 RX ADMIN — Medication 125 MILLILITER(S): at 11:50

## 2023-01-01 RX ADMIN — ERYTHROPOIETIN 10000 UNIT(S): 10000 INJECTION, SOLUTION INTRAVENOUS; SUBCUTANEOUS at 08:01

## 2023-01-01 RX ADMIN — MIDODRINE HYDROCHLORIDE 30 MILLIGRAM(S): 2.5 TABLET ORAL at 01:59

## 2023-01-01 RX ADMIN — Medication 1 DROP(S): at 13:44

## 2023-01-01 RX ADMIN — Medication 25 MILLIGRAM(S): at 05:53

## 2023-01-01 RX ADMIN — Medication 40 MILLIGRAM(S): at 18:02

## 2023-01-01 RX ADMIN — Medication 2: at 22:47

## 2023-01-01 RX ADMIN — Medication 3 MILLILITER(S): at 10:49

## 2023-01-01 RX ADMIN — SODIUM CHLORIDE 1 GRAM(S): 9 INJECTION INTRAMUSCULAR; INTRAVENOUS; SUBCUTANEOUS at 05:32

## 2023-01-01 RX ADMIN — Medication 300 MILLIGRAM(S): at 05:31

## 2023-01-01 RX ADMIN — APIXABAN 5 MILLIGRAM(S): 2.5 TABLET, FILM COATED ORAL at 17:52

## 2023-01-01 RX ADMIN — SEVELAMER CARBONATE 1600 MILLIGRAM(S): 2400 POWDER, FOR SUSPENSION ORAL at 05:52

## 2023-01-01 RX ADMIN — Medication 0.1 MILLIGRAM(S): at 06:24

## 2023-01-01 RX ADMIN — HUMAN INSULIN 3 UNIT(S): 100 INJECTION, SUSPENSION SUBCUTANEOUS at 11:37

## 2023-01-01 RX ADMIN — MIDODRINE HYDROCHLORIDE 40 MILLIGRAM(S): 2.5 TABLET ORAL at 17:23

## 2023-01-01 RX ADMIN — Medication 0.1 MILLIGRAM(S): at 12:06

## 2023-01-01 RX ADMIN — DROXIDOPA 200 MILLIGRAM(S): 100 CAPSULE ORAL at 13:30

## 2023-01-01 RX ADMIN — Medication 100 MILLIEQUIVALENT(S): at 01:49

## 2023-01-01 RX ADMIN — MIDODRINE HYDROCHLORIDE 40 MILLIGRAM(S): 2.5 TABLET ORAL at 17:05

## 2023-01-01 RX ADMIN — Medication 300 MILLIGRAM(S): at 17:46

## 2023-01-01 RX ADMIN — Medication 1 DROP(S): at 09:23

## 2023-01-01 RX ADMIN — HUMAN INSULIN 3 UNIT(S): 100 INJECTION, SUSPENSION SUBCUTANEOUS at 23:59

## 2023-01-01 RX ADMIN — Medication 6 UNIT(S): at 13:07

## 2023-01-01 RX ADMIN — Medication 1000 MILLIGRAM(S): at 17:40

## 2023-01-01 RX ADMIN — Medication 3 MILLILITER(S): at 22:57

## 2023-01-01 RX ADMIN — ALBUTEROL 2.5 MILLIGRAM(S): 90 AEROSOL, METERED ORAL at 09:43

## 2023-01-01 RX ADMIN — SEVELAMER CARBONATE 1600 MILLIGRAM(S): 2400 POWDER, FOR SUSPENSION ORAL at 05:11

## 2023-01-01 RX ADMIN — DIPHENHYDRAMINE HYDROCHLORIDE AND LIDOCAINE HYDROCHLORIDE AND ALUMINUM HYDROXIDE AND MAGNESIUM HYDRO 10 MILLILITER(S): KIT at 11:11

## 2023-01-01 RX ADMIN — SODIUM CHLORIDE 4 MILLILITER(S): 9 INJECTION INTRAMUSCULAR; INTRAVENOUS; SUBCUTANEOUS at 03:34

## 2023-01-01 RX ADMIN — POLYMYXIN B SULFATE 250 UNIT(S): 500000 INJECTION, POWDER, LYOPHILIZED, FOR SOLUTION INTRAMUSCULAR; INTRATHECAL; INTRAVENOUS; OPHTHALMIC at 18:38

## 2023-01-01 RX ADMIN — Medication 3 MILLILITER(S): at 23:36

## 2023-01-01 RX ADMIN — SODIUM CHLORIDE 3 MILLILITER(S): 9 INJECTION INTRAMUSCULAR; INTRAVENOUS; SUBCUTANEOUS at 05:14

## 2023-01-01 RX ADMIN — HUMAN INSULIN 6 UNIT(S): 100 INJECTION, SUSPENSION SUBCUTANEOUS at 18:34

## 2023-01-01 RX ADMIN — CHLORHEXIDINE GLUCONATE 1 APPLICATION(S): 213 SOLUTION TOPICAL at 05:25

## 2023-01-01 RX ADMIN — CHLORHEXIDINE GLUCONATE 15 MILLILITER(S): 213 SOLUTION TOPICAL at 17:08

## 2023-01-01 RX ADMIN — HEPARIN SODIUM 5000 UNIT(S): 5000 INJECTION INTRAVENOUS; SUBCUTANEOUS at 05:22

## 2023-01-01 RX ADMIN — Medication 1 DROP(S): at 18:54

## 2023-01-01 RX ADMIN — DROXIDOPA 400 MILLIGRAM(S): 100 CAPSULE ORAL at 05:13

## 2023-01-01 RX ADMIN — Medication 10 MILLIGRAM(S): at 01:04

## 2023-01-01 RX ADMIN — Medication 3 MILLILITER(S): at 22:46

## 2023-01-01 RX ADMIN — Medication 8: at 17:57

## 2023-01-01 RX ADMIN — SODIUM CHLORIDE 1 GRAM(S): 9 INJECTION INTRAMUSCULAR; INTRAVENOUS; SUBCUTANEOUS at 17:36

## 2023-01-01 RX ADMIN — HUMAN INSULIN 3 UNIT(S): 100 INJECTION, SUSPENSION SUBCUTANEOUS at 23:26

## 2023-01-01 RX ADMIN — SODIUM CHLORIDE 4 MILLILITER(S): 9 INJECTION INTRAMUSCULAR; INTRAVENOUS; SUBCUTANEOUS at 16:08

## 2023-01-01 RX ADMIN — HUMAN INSULIN 6 UNIT(S): 100 INJECTION, SUSPENSION SUBCUTANEOUS at 17:07

## 2023-01-01 RX ADMIN — MIDODRINE HYDROCHLORIDE 40 MILLIGRAM(S): 2.5 TABLET ORAL at 06:51

## 2023-01-01 RX ADMIN — CHLORHEXIDINE GLUCONATE 15 MILLILITER(S): 213 SOLUTION TOPICAL at 18:15

## 2023-01-01 RX ADMIN — HUMAN INSULIN 3 UNIT(S): 100 INJECTION, SUSPENSION SUBCUTANEOUS at 12:13

## 2023-01-01 RX ADMIN — Medication 6.23 MICROGRAM(S)/KG/MIN: at 20:02

## 2023-01-01 RX ADMIN — BUMETANIDE 10 MG/HR: 0.25 INJECTION INTRAMUSCULAR; INTRAVENOUS at 09:32

## 2023-01-01 RX ADMIN — Medication 2: at 11:44

## 2023-01-01 RX ADMIN — DIPHENHYDRAMINE HYDROCHLORIDE AND LIDOCAINE HYDROCHLORIDE AND ALUMINUM HYDROXIDE AND MAGNESIUM HYDRO 10 MILLILITER(S): KIT at 05:38

## 2023-01-01 RX ADMIN — HEPARIN SODIUM 5000 UNIT(S): 5000 INJECTION INTRAVENOUS; SUBCUTANEOUS at 20:47

## 2023-01-01 RX ADMIN — Medication 300 MILLIGRAM(S): at 11:42

## 2023-01-01 RX ADMIN — INSULIN HUMAN 5 UNIT(S): 100 INJECTION, SOLUTION SUBCUTANEOUS at 09:46

## 2023-01-01 RX ADMIN — ALBUTEROL 2.5 MILLIGRAM(S): 90 AEROSOL, METERED ORAL at 14:49

## 2023-01-01 RX ADMIN — MIDODRINE HYDROCHLORIDE 10 MILLIGRAM(S): 2.5 TABLET ORAL at 17:30

## 2023-01-01 RX ADMIN — Medication 4: at 11:58

## 2023-01-01 RX ADMIN — MIDODRINE HYDROCHLORIDE 30 MILLIGRAM(S): 2.5 TABLET ORAL at 13:32

## 2023-01-01 RX ADMIN — Medication 4 DROP(S): at 06:34

## 2023-01-01 RX ADMIN — Medication 3 MILLILITER(S): at 14:40

## 2023-01-01 RX ADMIN — Medication 3 MILLILITER(S): at 04:09

## 2023-01-01 RX ADMIN — Medication 3 MILLILITER(S): at 16:33

## 2023-01-01 RX ADMIN — Medication 4 DROP(S): at 05:31

## 2023-01-01 RX ADMIN — Medication 3.12 MICROGRAM(S)/KG/MIN: at 08:19

## 2023-01-01 RX ADMIN — Medication 1 PACKET(S): at 21:11

## 2023-01-01 RX ADMIN — Medication 40 MILLIGRAM(S): at 06:03

## 2023-01-01 RX ADMIN — ATORVASTATIN CALCIUM 40 MILLIGRAM(S): 80 TABLET, FILM COATED ORAL at 21:38

## 2023-01-01 RX ADMIN — SEVELAMER CARBONATE 800 MILLIGRAM(S): 2400 POWDER, FOR SUSPENSION ORAL at 05:23

## 2023-01-01 RX ADMIN — CARVEDILOL PHOSPHATE 37.5 MILLIGRAM(S): 80 CAPSULE, EXTENDED RELEASE ORAL at 05:39

## 2023-01-01 RX ADMIN — ATORVASTATIN CALCIUM 10 MILLIGRAM(S): 80 TABLET, FILM COATED ORAL at 21:37

## 2023-01-01 RX ADMIN — Medication 1 SPRAY(S): at 06:37

## 2023-01-01 RX ADMIN — Medication 400 MILLILITER(S): at 18:18

## 2023-01-01 RX ADMIN — HUMAN INSULIN 3 UNIT(S): 100 INJECTION, SUSPENSION SUBCUTANEOUS at 05:58

## 2023-01-01 RX ADMIN — Medication 1 TABLET(S): at 12:07

## 2023-01-01 RX ADMIN — CHLORHEXIDINE GLUCONATE 15 MILLILITER(S): 213 SOLUTION TOPICAL at 17:15

## 2023-01-01 RX ADMIN — Medication 6.23 MICROGRAM(S)/KG/MIN: at 15:00

## 2023-01-01 RX ADMIN — Medication 0.3 MILLIGRAM(S): at 06:03

## 2023-01-01 RX ADMIN — Medication 2: at 05:57

## 2023-01-01 RX ADMIN — HUMAN INSULIN 6 UNIT(S): 100 INJECTION, SUSPENSION SUBCUTANEOUS at 18:20

## 2023-01-01 RX ADMIN — Medication 300 MILLIGRAM(S): at 12:30

## 2023-01-01 RX ADMIN — BUMETANIDE 2 MILLIGRAM(S): 0.25 INJECTION INTRAMUSCULAR; INTRAVENOUS at 11:56

## 2023-01-01 RX ADMIN — Medication 1 DROP(S): at 09:26

## 2023-01-01 RX ADMIN — Medication 2.49 MICROGRAM(S)/KG/MIN: at 02:47

## 2023-01-01 RX ADMIN — Medication 81 MILLIGRAM(S): at 13:23

## 2023-01-01 RX ADMIN — Medication 3 MILLILITER(S): at 21:11

## 2023-01-01 RX ADMIN — Medication 1 DROP(S): at 18:51

## 2023-01-01 RX ADMIN — SODIUM CHLORIDE 4 MILLILITER(S): 9 INJECTION INTRAMUSCULAR; INTRAVENOUS; SUBCUTANEOUS at 10:54

## 2023-01-01 RX ADMIN — HEPARIN SODIUM 5000 UNIT(S): 5000 INJECTION INTRAVENOUS; SUBCUTANEOUS at 14:15

## 2023-01-01 RX ADMIN — ALBUTEROL 2.5 MILLIGRAM(S): 90 AEROSOL, METERED ORAL at 11:25

## 2023-01-01 RX ADMIN — Medication 12 UNIT(S): at 08:25

## 2023-01-01 RX ADMIN — FENTANYL CITRATE 6.65 MICROGRAM(S)/KG/HR: 50 INJECTION INTRAVENOUS at 06:35

## 2023-01-01 RX ADMIN — Medication 1 APPLICATION(S): at 17:16

## 2023-01-01 RX ADMIN — Medication 3 MILLILITER(S): at 21:54

## 2023-01-01 RX ADMIN — Medication 3 MILLILITER(S): at 03:26

## 2023-01-01 RX ADMIN — Medication 1 DROP(S): at 16:47

## 2023-01-01 RX ADMIN — SEVELAMER CARBONATE 1600 MILLIGRAM(S): 2400 POWDER, FOR SUSPENSION ORAL at 05:22

## 2023-01-01 RX ADMIN — CHLORHEXIDINE GLUCONATE 15 MILLILITER(S): 213 SOLUTION TOPICAL at 05:42

## 2023-01-01 RX ADMIN — CHLORHEXIDINE GLUCONATE 1 APPLICATION(S): 213 SOLUTION TOPICAL at 06:14

## 2023-01-01 RX ADMIN — Medication 1 DROP(S): at 18:26

## 2023-01-01 RX ADMIN — DEXMEDETOMIDINE HYDROCHLORIDE IN 0.9% SODIUM CHLORIDE 4.99 MICROGRAM(S)/KG/HR: 4 INJECTION INTRAVENOUS at 20:41

## 2023-01-01 RX ADMIN — SODIUM CHLORIDE 4 MILLILITER(S): 9 INJECTION INTRAMUSCULAR; INTRAVENOUS; SUBCUTANEOUS at 22:52

## 2023-01-01 RX ADMIN — Medication 1 APPLICATION(S): at 11:18

## 2023-01-01 RX ADMIN — HUMAN INSULIN 6 UNIT(S): 100 INJECTION, SUSPENSION SUBCUTANEOUS at 05:27

## 2023-01-01 RX ADMIN — LEVETIRACETAM 1000 MILLIGRAM(S): 250 TABLET, FILM COATED ORAL at 11:33

## 2023-01-01 RX ADMIN — Medication 4: at 00:01

## 2023-01-01 RX ADMIN — HEPARIN SODIUM 5000 UNIT(S): 5000 INJECTION INTRAVENOUS; SUBCUTANEOUS at 23:03

## 2023-01-01 RX ADMIN — Medication 1 APPLICATION(S): at 05:04

## 2023-01-01 RX ADMIN — HUMAN INSULIN 3 UNIT(S): 100 INJECTION, SUSPENSION SUBCUTANEOUS at 18:23

## 2023-01-01 RX ADMIN — Medication 300 MILLIGRAM(S): at 11:38

## 2023-01-01 RX ADMIN — MEROPENEM 100 MILLIGRAM(S): 1 INJECTION INTRAVENOUS at 18:30

## 2023-01-01 RX ADMIN — Medication 2: at 17:54

## 2023-01-01 RX ADMIN — Medication 1 DROP(S): at 13:11

## 2023-01-01 RX ADMIN — PANTOPRAZOLE SODIUM 40 MILLIGRAM(S): 20 TABLET, DELAYED RELEASE ORAL at 18:04

## 2023-01-01 RX ADMIN — Medication 650 MILLIGRAM(S): at 15:16

## 2023-01-01 RX ADMIN — CHLORHEXIDINE GLUCONATE 1 APPLICATION(S): 213 SOLUTION TOPICAL at 05:33

## 2023-01-01 RX ADMIN — Medication 3 MILLILITER(S): at 03:58

## 2023-01-01 RX ADMIN — CHLORHEXIDINE GLUCONATE 15 MILLILITER(S): 213 SOLUTION TOPICAL at 17:10

## 2023-01-01 RX ADMIN — MIDODRINE HYDROCHLORIDE 40 MILLIGRAM(S): 2.5 TABLET ORAL at 06:27

## 2023-01-01 RX ADMIN — MUPIROCIN 1 APPLICATION(S): 20 OINTMENT TOPICAL at 18:03

## 2023-01-01 RX ADMIN — Medication 650 MILLIGRAM(S): at 00:01

## 2023-01-01 RX ADMIN — BUMETANIDE 2 MILLIGRAM(S): 0.25 INJECTION INTRAMUSCULAR; INTRAVENOUS at 13:41

## 2023-01-01 RX ADMIN — SODIUM CHLORIDE 4 MILLILITER(S): 9 INJECTION INTRAMUSCULAR; INTRAVENOUS; SUBCUTANEOUS at 21:10

## 2023-01-01 RX ADMIN — HUMAN INSULIN 5 UNIT(S): 100 INJECTION, SUSPENSION SUBCUTANEOUS at 05:53

## 2023-01-01 RX ADMIN — MIDODRINE HYDROCHLORIDE 30 MILLIGRAM(S): 2.5 TABLET ORAL at 11:30

## 2023-01-01 RX ADMIN — Medication 1 DROP(S): at 21:38

## 2023-01-01 RX ADMIN — Medication 4 DROP(S): at 18:08

## 2023-01-01 RX ADMIN — Medication 650 MILLIGRAM(S): at 21:37

## 2023-01-01 RX ADMIN — SEVELAMER CARBONATE 800 MILLIGRAM(S): 2400 POWDER, FOR SUSPENSION ORAL at 21:55

## 2023-01-01 RX ADMIN — HUMAN INSULIN 6 UNIT(S): 100 INJECTION, SUSPENSION SUBCUTANEOUS at 13:10

## 2023-01-01 RX ADMIN — Medication 0.5 MILLIGRAM(S): at 18:53

## 2023-01-01 RX ADMIN — Medication 4.99 MICROGRAM(S)/KG/MIN: at 20:44

## 2023-01-01 RX ADMIN — PANTOPRAZOLE SODIUM 40 MILLIGRAM(S): 20 TABLET, DELAYED RELEASE ORAL at 19:05

## 2023-01-01 RX ADMIN — INSULIN GLARGINE 15 UNIT(S): 100 INJECTION, SOLUTION SUBCUTANEOUS at 22:45

## 2023-01-01 RX ADMIN — SEVELAMER CARBONATE 1600 MILLIGRAM(S): 2400 POWDER, FOR SUSPENSION ORAL at 06:35

## 2023-01-01 RX ADMIN — HUMAN INSULIN 6 UNIT(S): 100 INJECTION, SUSPENSION SUBCUTANEOUS at 12:18

## 2023-01-01 RX ADMIN — Medication 100 MILLIGRAM(S): at 17:18

## 2023-01-01 RX ADMIN — CHLORHEXIDINE GLUCONATE 1 APPLICATION(S): 213 SOLUTION TOPICAL at 05:30

## 2023-01-01 RX ADMIN — Medication 12 UNIT(S): at 12:05

## 2023-01-01 RX ADMIN — Medication 500 MILLIGRAM(S): at 14:13

## 2023-01-01 RX ADMIN — MUPIROCIN 1 APPLICATION(S): 20 OINTMENT TOPICAL at 17:31

## 2023-01-01 RX ADMIN — Medication 1 DROP(S): at 10:37

## 2023-01-01 RX ADMIN — LEVETIRACETAM 250 MILLIGRAM(S): 250 TABLET, FILM COATED ORAL at 20:33

## 2023-01-01 RX ADMIN — SODIUM CHLORIDE 4 MILLILITER(S): 9 INJECTION INTRAMUSCULAR; INTRAVENOUS; SUBCUTANEOUS at 21:00

## 2023-01-01 RX ADMIN — Medication 10: at 12:43

## 2023-01-01 RX ADMIN — MIDODRINE HYDROCHLORIDE 40 MILLIGRAM(S): 2.5 TABLET ORAL at 12:55

## 2023-01-01 RX ADMIN — PIPERACILLIN AND TAZOBACTAM 25 GRAM(S): 4; .5 INJECTION, POWDER, LYOPHILIZED, FOR SOLUTION INTRAVENOUS at 06:37

## 2023-01-01 RX ADMIN — SODIUM CHLORIDE 4 MILLILITER(S): 9 INJECTION INTRAMUSCULAR; INTRAVENOUS; SUBCUTANEOUS at 15:51

## 2023-01-01 RX ADMIN — MIDODRINE HYDROCHLORIDE 40 MILLIGRAM(S): 2.5 TABLET ORAL at 12:41

## 2023-01-01 RX ADMIN — Medication 1000 MILLIGRAM(S): at 16:00

## 2023-01-01 RX ADMIN — HUMAN INSULIN 6 UNIT(S): 100 INJECTION, SUSPENSION SUBCUTANEOUS at 23:17

## 2023-01-01 RX ADMIN — SODIUM CHLORIDE 4 MILLILITER(S): 9 INJECTION INTRAMUSCULAR; INTRAVENOUS; SUBCUTANEOUS at 03:46

## 2023-01-01 RX ADMIN — Medication 6: at 00:21

## 2023-01-01 RX ADMIN — DROXIDOPA 600 MILLIGRAM(S): 100 CAPSULE ORAL at 13:36

## 2023-01-01 RX ADMIN — Medication 2: at 17:06

## 2023-01-01 RX ADMIN — HUMAN INSULIN 6 UNIT(S): 100 INJECTION, SUSPENSION SUBCUTANEOUS at 18:50

## 2023-01-01 RX ADMIN — DROXIDOPA 600 MILLIGRAM(S): 100 CAPSULE ORAL at 21:23

## 2023-01-01 RX ADMIN — CALAMINE AND ZINC OXIDE AND PHENOL 1 APPLICATION(S): 160; 10 LOTION TOPICAL at 11:43

## 2023-01-01 RX ADMIN — Medication 1 APPLICATION(S): at 00:32

## 2023-01-01 RX ADMIN — Medication 1 DROP(S): at 01:16

## 2023-01-01 RX ADMIN — Medication 2: at 00:40

## 2023-01-01 RX ADMIN — APIXABAN 5 MILLIGRAM(S): 2.5 TABLET, FILM COATED ORAL at 05:07

## 2023-01-01 RX ADMIN — CARVEDILOL PHOSPHATE 12.5 MILLIGRAM(S): 80 CAPSULE, EXTENDED RELEASE ORAL at 18:16

## 2023-01-01 RX ADMIN — CHLORHEXIDINE GLUCONATE 1 APPLICATION(S): 213 SOLUTION TOPICAL at 05:06

## 2023-01-01 RX ADMIN — Medication 200 MILLIGRAM(S): at 21:33

## 2023-01-01 RX ADMIN — Medication 1 APPLICATION(S): at 05:44

## 2023-01-01 RX ADMIN — HUMAN INSULIN 4 UNIT(S): 100 INJECTION, SUSPENSION SUBCUTANEOUS at 17:36

## 2023-01-01 RX ADMIN — Medication 100 MILLIGRAM(S): at 10:45

## 2023-01-01 RX ADMIN — Medication 100 MILLIGRAM(S): at 12:59

## 2023-01-01 RX ADMIN — Medication 6.23 MICROGRAM(S)/KG/MIN: at 07:23

## 2023-01-01 RX ADMIN — Medication 4: at 05:45

## 2023-01-01 RX ADMIN — HUMAN INSULIN 6 UNIT(S): 100 INJECTION, SUSPENSION SUBCUTANEOUS at 00:18

## 2023-01-01 RX ADMIN — SODIUM CHLORIDE 3 MILLILITER(S): 9 INJECTION INTRAMUSCULAR; INTRAVENOUS; SUBCUTANEOUS at 23:44

## 2023-01-01 RX ADMIN — Medication 1 APPLICATION(S): at 06:33

## 2023-01-01 RX ADMIN — SODIUM CHLORIDE 4 MILLILITER(S): 9 INJECTION INTRAMUSCULAR; INTRAVENOUS; SUBCUTANEOUS at 15:53

## 2023-01-01 RX ADMIN — SODIUM CHLORIDE 4 MILLILITER(S): 9 INJECTION INTRAMUSCULAR; INTRAVENOUS; SUBCUTANEOUS at 21:55

## 2023-01-01 RX ADMIN — Medication 300 MILLIGRAM(S): at 12:54

## 2023-01-01 RX ADMIN — HUMAN INSULIN 6 UNIT(S): 100 INJECTION, SUSPENSION SUBCUTANEOUS at 05:13

## 2023-01-01 RX ADMIN — Medication 1 DROP(S): at 02:03

## 2023-01-01 RX ADMIN — Medication 3 MILLILITER(S): at 08:16

## 2023-01-01 RX ADMIN — Medication 4 DROP(S): at 05:37

## 2023-01-01 RX ADMIN — ATORVASTATIN CALCIUM 40 MILLIGRAM(S): 80 TABLET, FILM COATED ORAL at 22:07

## 2023-01-01 RX ADMIN — Medication 2: at 13:11

## 2023-01-01 RX ADMIN — FENTANYL CITRATE 3.75 MICROGRAM(S)/KG/HR: 50 INJECTION INTRAVENOUS at 17:52

## 2023-01-01 RX ADMIN — SODIUM CHLORIDE 1 GRAM(S): 9 INJECTION INTRAMUSCULAR; INTRAVENOUS; SUBCUTANEOUS at 05:06

## 2023-01-01 RX ADMIN — ATORVASTATIN CALCIUM 10 MILLIGRAM(S): 80 TABLET, FILM COATED ORAL at 21:24

## 2023-01-01 RX ADMIN — CHLORHEXIDINE GLUCONATE 1 APPLICATION(S): 213 SOLUTION TOPICAL at 06:44

## 2023-01-01 RX ADMIN — HUMAN INSULIN 11 UNIT(S): 100 INJECTION, SUSPENSION SUBCUTANEOUS at 05:59

## 2023-01-01 RX ADMIN — HUMAN INSULIN 6 UNIT(S): 100 INJECTION, SUSPENSION SUBCUTANEOUS at 00:49

## 2023-01-01 RX ADMIN — SEVELAMER CARBONATE 800 MILLIGRAM(S): 2400 POWDER, FOR SUSPENSION ORAL at 12:02

## 2023-01-01 RX ADMIN — DROXIDOPA 400 MILLIGRAM(S): 100 CAPSULE ORAL at 17:42

## 2023-01-01 RX ADMIN — Medication 650 MILLIGRAM(S): at 21:51

## 2023-01-01 RX ADMIN — Medication 1 DROP(S): at 21:02

## 2023-01-01 RX ADMIN — HEPARIN SODIUM 1000 UNIT(S)/HR: 5000 INJECTION INTRAVENOUS; SUBCUTANEOUS at 03:14

## 2023-01-01 RX ADMIN — SODIUM CHLORIDE 1 GRAM(S): 9 INJECTION INTRAMUSCULAR; INTRAVENOUS; SUBCUTANEOUS at 05:10

## 2023-01-01 RX ADMIN — Medication 40 MILLIGRAM(S): at 05:54

## 2023-01-01 RX ADMIN — SEVELAMER CARBONATE 800 MILLIGRAM(S): 2400 POWDER, FOR SUSPENSION ORAL at 13:42

## 2023-01-01 RX ADMIN — DROXIDOPA 600 MILLIGRAM(S): 100 CAPSULE ORAL at 10:19

## 2023-01-01 RX ADMIN — DIPHENHYDRAMINE HYDROCHLORIDE AND LIDOCAINE HYDROCHLORIDE AND ALUMINUM HYDROXIDE AND MAGNESIUM HYDRO 10 MILLILITER(S): KIT at 22:11

## 2023-01-01 RX ADMIN — Medication 6 UNIT(S): at 17:17

## 2023-01-01 RX ADMIN — CHLORHEXIDINE GLUCONATE 15 MILLILITER(S): 213 SOLUTION TOPICAL at 17:33

## 2023-01-01 RX ADMIN — Medication 0.2 MILLIGRAM(S): at 17:22

## 2023-01-01 RX ADMIN — MIDODRINE HYDROCHLORIDE 10 MILLIGRAM(S): 2.5 TABLET ORAL at 17:05

## 2023-01-01 RX ADMIN — CHLORHEXIDINE GLUCONATE 15 MILLILITER(S): 213 SOLUTION TOPICAL at 17:41

## 2023-01-01 RX ADMIN — Medication 1 PACKET(S): at 12:03

## 2023-01-01 RX ADMIN — Medication 1 DROP(S): at 22:07

## 2023-01-01 RX ADMIN — Medication 1 DROP(S): at 17:47

## 2023-01-01 RX ADMIN — Medication 1 DROP(S): at 17:41

## 2023-01-01 RX ADMIN — Medication 0.2 MILLIGRAM(S): at 10:19

## 2023-01-01 RX ADMIN — Medication 3 MILLILITER(S): at 03:33

## 2023-01-01 RX ADMIN — APIXABAN 5 MILLIGRAM(S): 2.5 TABLET, FILM COATED ORAL at 05:23

## 2023-01-01 RX ADMIN — Medication 1 DROP(S): at 23:25

## 2023-01-01 RX ADMIN — CHLORHEXIDINE GLUCONATE 15 MILLILITER(S): 213 SOLUTION TOPICAL at 18:25

## 2023-01-01 RX ADMIN — SODIUM CHLORIDE 2 GRAM(S): 9 INJECTION INTRAMUSCULAR; INTRAVENOUS; SUBCUTANEOUS at 05:35

## 2023-01-01 RX ADMIN — Medication 1 DROP(S): at 01:43

## 2023-01-01 RX ADMIN — Medication 1 DROP(S): at 17:05

## 2023-01-01 RX ADMIN — Medication 650 MILLIGRAM(S): at 21:04

## 2023-01-01 RX ADMIN — PROPOFOL 2 MICROGRAM(S)/KG/MIN: 10 INJECTION, EMULSION INTRAVENOUS at 19:59

## 2023-01-01 RX ADMIN — Medication 1 APPLICATION(S): at 12:04

## 2023-01-01 RX ADMIN — Medication 50 MILLILITER(S): at 04:56

## 2023-01-01 RX ADMIN — ALBUTEROL 2.5 MILLIGRAM(S): 90 AEROSOL, METERED ORAL at 22:41

## 2023-01-01 RX ADMIN — LEVETIRACETAM 1000 MILLIGRAM(S): 250 TABLET, FILM COATED ORAL at 11:52

## 2023-01-01 RX ADMIN — Medication 50 MILLILITER(S): at 06:40

## 2023-01-01 RX ADMIN — Medication 1 DROP(S): at 19:30

## 2023-01-01 RX ADMIN — Medication 10 MILLIGRAM(S): at 17:05

## 2023-01-01 RX ADMIN — FLUCONAZOLE 100 MILLIGRAM(S): 150 TABLET ORAL at 21:27

## 2023-01-01 RX ADMIN — SODIUM CHLORIDE 4 MILLILITER(S): 9 INJECTION INTRAMUSCULAR; INTRAVENOUS; SUBCUTANEOUS at 03:15

## 2023-01-01 RX ADMIN — NICARDIPINE HYDROCHLORIDE 12.5 MG/HR: 30 CAPSULE, EXTENDED RELEASE ORAL at 08:24

## 2023-01-01 RX ADMIN — CHLORHEXIDINE GLUCONATE 1 APPLICATION(S): 213 SOLUTION TOPICAL at 05:59

## 2023-01-01 RX ADMIN — HUMAN INSULIN 11 UNIT(S): 100 INJECTION, SUSPENSION SUBCUTANEOUS at 17:44

## 2023-01-01 RX ADMIN — MIDODRINE HYDROCHLORIDE 10 MILLIGRAM(S): 2.5 TABLET ORAL at 18:23

## 2023-01-01 RX ADMIN — HUMAN INSULIN 1 UNIT(S): 100 INJECTION, SUSPENSION SUBCUTANEOUS at 00:26

## 2023-01-01 RX ADMIN — Medication 2000 UNIT(S): at 12:55

## 2023-01-01 RX ADMIN — ALBUTEROL 2.5 MILLIGRAM(S): 90 AEROSOL, METERED ORAL at 16:00

## 2023-01-01 RX ADMIN — SODIUM CHLORIDE 4 MILLILITER(S): 9 INJECTION INTRAMUSCULAR; INTRAVENOUS; SUBCUTANEOUS at 10:44

## 2023-01-01 RX ADMIN — MUPIROCIN 1 APPLICATION(S): 20 OINTMENT TOPICAL at 17:25

## 2023-01-01 RX ADMIN — Medication 1 APPLICATION(S): at 12:18

## 2023-01-01 RX ADMIN — ALBUTEROL 2.5 MILLIGRAM(S): 90 AEROSOL, METERED ORAL at 04:02

## 2023-01-01 RX ADMIN — Medication 1 DROP(S): at 13:20

## 2023-01-01 RX ADMIN — Medication 1 DROP(S): at 21:46

## 2023-01-01 RX ADMIN — DROXIDOPA 200 MILLIGRAM(S): 100 CAPSULE ORAL at 11:15

## 2023-01-01 RX ADMIN — Medication 2: at 17:28

## 2023-01-01 RX ADMIN — SODIUM CHLORIDE 4 MILLILITER(S): 9 INJECTION INTRAMUSCULAR; INTRAVENOUS; SUBCUTANEOUS at 09:49

## 2023-01-01 RX ADMIN — Medication 2: at 05:09

## 2023-01-01 RX ADMIN — Medication 650 MILLIGRAM(S): at 12:09

## 2023-01-01 RX ADMIN — CHLORHEXIDINE GLUCONATE 15 MILLILITER(S): 213 SOLUTION TOPICAL at 05:04

## 2023-01-01 RX ADMIN — Medication 3 MILLILITER(S): at 08:47

## 2023-01-01 RX ADMIN — MIDODRINE HYDROCHLORIDE 20 MILLIGRAM(S): 2.5 TABLET ORAL at 07:04

## 2023-01-01 RX ADMIN — Medication 1 DROP(S): at 17:22

## 2023-01-01 RX ADMIN — Medication 1 DROP(S): at 14:13

## 2023-01-01 RX ADMIN — CHLORHEXIDINE GLUCONATE 15 MILLILITER(S): 213 SOLUTION TOPICAL at 18:11

## 2023-01-01 RX ADMIN — SODIUM CHLORIDE 4 MILLILITER(S): 9 INJECTION INTRAMUSCULAR; INTRAVENOUS; SUBCUTANEOUS at 09:27

## 2023-01-01 RX ADMIN — Medication 20 MILLIGRAM(S): at 05:45

## 2023-01-01 RX ADMIN — Medication 0.2 MILLIGRAM(S): at 01:38

## 2023-01-01 RX ADMIN — Medication 300 MILLIGRAM(S): at 14:42

## 2023-01-01 RX ADMIN — Medication 4 DROP(S): at 06:15

## 2023-01-01 RX ADMIN — MIDODRINE HYDROCHLORIDE 20 MILLIGRAM(S): 2.5 TABLET ORAL at 15:07

## 2023-01-01 RX ADMIN — APIXABAN 10 MILLIGRAM(S): 2.5 TABLET, FILM COATED ORAL at 05:13

## 2023-01-01 RX ADMIN — MIDODRINE HYDROCHLORIDE 30 MILLIGRAM(S): 2.5 TABLET ORAL at 13:04

## 2023-01-01 RX ADMIN — HUMAN INSULIN 5 UNIT(S): 100 INJECTION, SUSPENSION SUBCUTANEOUS at 13:06

## 2023-01-01 RX ADMIN — Medication 40 MILLIGRAM(S): at 05:14

## 2023-01-01 RX ADMIN — CALAMINE AND ZINC OXIDE AND PHENOL 1 APPLICATION(S): 160; 10 LOTION TOPICAL at 11:27

## 2023-01-01 RX ADMIN — Medication 1 APPLICATION(S): at 05:27

## 2023-01-01 RX ADMIN — Medication 3 MILLILITER(S): at 13:17

## 2023-01-01 RX ADMIN — Medication 600 MILLIGRAM(S): at 14:19

## 2023-01-01 RX ADMIN — SODIUM CHLORIDE 100 MILLILITER(S): 9 INJECTION INTRAMUSCULAR; INTRAVENOUS; SUBCUTANEOUS at 18:16

## 2023-01-01 RX ADMIN — Medication 3 MILLILITER(S): at 21:26

## 2023-01-01 RX ADMIN — Medication 6: at 12:08

## 2023-01-01 RX ADMIN — BUMETANIDE 2 MILLIGRAM(S): 0.25 INJECTION INTRAMUSCULAR; INTRAVENOUS at 06:06

## 2023-01-01 RX ADMIN — HEPARIN SODIUM 5000 UNIT(S): 5000 INJECTION INTRAVENOUS; SUBCUTANEOUS at 13:10

## 2023-01-01 RX ADMIN — DROXIDOPA 600 MILLIGRAM(S): 100 CAPSULE ORAL at 13:59

## 2023-01-01 RX ADMIN — ATORVASTATIN CALCIUM 10 MILLIGRAM(S): 80 TABLET, FILM COATED ORAL at 22:02

## 2023-01-01 RX ADMIN — Medication 300 MILLIGRAM(S): at 11:08

## 2023-01-01 RX ADMIN — MIDODRINE HYDROCHLORIDE 20 MILLIGRAM(S): 2.5 TABLET ORAL at 23:25

## 2023-01-01 RX ADMIN — SODIUM CHLORIDE 4 MILLILITER(S): 9 INJECTION INTRAMUSCULAR; INTRAVENOUS; SUBCUTANEOUS at 21:27

## 2023-01-01 RX ADMIN — Medication 0.5 MILLIGRAM(S): at 17:27

## 2023-01-01 RX ADMIN — Medication 300 MILLIGRAM(S): at 11:58

## 2023-01-01 RX ADMIN — INSULIN GLARGINE 12 UNIT(S): 100 INJECTION, SOLUTION SUBCUTANEOUS at 22:03

## 2023-01-01 RX ADMIN — Medication 1 APPLICATION(S): at 06:38

## 2023-01-01 RX ADMIN — MEROPENEM 100 MILLIGRAM(S): 1 INJECTION INTRAVENOUS at 09:17

## 2023-01-01 RX ADMIN — Medication 100 MILLIGRAM(S): at 22:01

## 2023-01-01 RX ADMIN — SODIUM CHLORIDE 4 MILLILITER(S): 9 INJECTION INTRAMUSCULAR; INTRAVENOUS; SUBCUTANEOUS at 03:14

## 2023-01-01 RX ADMIN — MIDODRINE HYDROCHLORIDE 40 MILLIGRAM(S): 2.5 TABLET ORAL at 18:39

## 2023-01-01 RX ADMIN — Medication 4: at 11:24

## 2023-01-01 RX ADMIN — BUMETANIDE 2 MILLIGRAM(S): 0.25 INJECTION INTRAMUSCULAR; INTRAVENOUS at 16:53

## 2023-01-01 RX ADMIN — Medication 3 MILLILITER(S): at 21:16

## 2023-01-01 RX ADMIN — DROXIDOPA 600 MILLIGRAM(S): 100 CAPSULE ORAL at 02:43

## 2023-01-01 RX ADMIN — VALACYCLOVIR 500 MILLIGRAM(S): 500 TABLET, FILM COATED ORAL at 13:23

## 2023-01-01 RX ADMIN — SODIUM CHLORIDE 3 MILLILITER(S): 9 INJECTION INTRAMUSCULAR; INTRAVENOUS; SUBCUTANEOUS at 00:14

## 2023-01-01 RX ADMIN — DROXIDOPA 200 MILLIGRAM(S): 100 CAPSULE ORAL at 08:33

## 2023-01-01 RX ADMIN — SODIUM CHLORIDE 4 MILLILITER(S): 9 INJECTION INTRAMUSCULAR; INTRAVENOUS; SUBCUTANEOUS at 03:59

## 2023-01-01 RX ADMIN — Medication 1 DROP(S): at 21:45

## 2023-01-01 RX ADMIN — Medication 300 MILLIGRAM(S): at 13:09

## 2023-01-01 RX ADMIN — Medication 2: at 00:18

## 2023-01-01 RX ADMIN — Medication 6: at 06:03

## 2023-01-01 RX ADMIN — Medication 50 MILLIGRAM(S): at 05:28

## 2023-01-01 RX ADMIN — Medication 3 MILLILITER(S): at 10:50

## 2023-01-01 RX ADMIN — HUMAN INSULIN 3 UNIT(S): 100 INJECTION, SUSPENSION SUBCUTANEOUS at 00:17

## 2023-01-01 RX ADMIN — Medication 4: at 23:35

## 2023-01-01 RX ADMIN — Medication 2: at 06:18

## 2023-01-01 RX ADMIN — Medication 300 MILLIGRAM(S): at 11:25

## 2023-01-01 RX ADMIN — Medication 25 MILLIGRAM(S): at 16:05

## 2023-01-01 RX ADMIN — HEPARIN SODIUM 5000 UNIT(S): 5000 INJECTION INTRAVENOUS; SUBCUTANEOUS at 13:17

## 2023-01-01 RX ADMIN — SODIUM CHLORIDE 4 MILLILITER(S): 9 INJECTION INTRAMUSCULAR; INTRAVENOUS; SUBCUTANEOUS at 22:44

## 2023-01-01 RX ADMIN — MUPIROCIN 1 APPLICATION(S): 20 OINTMENT TOPICAL at 17:45

## 2023-01-01 RX ADMIN — Medication 1 DROP(S): at 21:15

## 2023-01-01 RX ADMIN — MIDODRINE HYDROCHLORIDE 30 MILLIGRAM(S): 2.5 TABLET ORAL at 13:30

## 2023-01-01 RX ADMIN — DIPHENHYDRAMINE HYDROCHLORIDE AND LIDOCAINE HYDROCHLORIDE AND ALUMINUM HYDROXIDE AND MAGNESIUM HYDRO 10 MILLILITER(S): KIT at 18:16

## 2023-01-01 RX ADMIN — MIDODRINE HYDROCHLORIDE 10 MILLIGRAM(S): 2.5 TABLET ORAL at 11:33

## 2023-01-01 RX ADMIN — DROXIDOPA 600 MILLIGRAM(S): 100 CAPSULE ORAL at 13:44

## 2023-01-01 RX ADMIN — SODIUM CHLORIDE 4 MILLILITER(S): 9 INJECTION INTRAMUSCULAR; INTRAVENOUS; SUBCUTANEOUS at 22:08

## 2023-01-01 RX ADMIN — BUMETANIDE 2 MILLIGRAM(S): 0.25 INJECTION INTRAMUSCULAR; INTRAVENOUS at 23:25

## 2023-01-01 RX ADMIN — ERYTHROPOIETIN 10000 UNIT(S): 10000 INJECTION, SOLUTION INTRAVENOUS; SUBCUTANEOUS at 08:38

## 2023-01-01 RX ADMIN — Medication 1 DROP(S): at 05:05

## 2023-01-01 RX ADMIN — HUMAN INSULIN 1 UNIT(S): 100 INJECTION, SUSPENSION SUBCUTANEOUS at 00:53

## 2023-01-01 RX ADMIN — Medication 1 APPLICATION(S): at 06:55

## 2023-01-01 RX ADMIN — Medication 1 DROP(S): at 02:34

## 2023-01-01 RX ADMIN — LEVETIRACETAM 1000 MILLIGRAM(S): 250 TABLET, FILM COATED ORAL at 11:55

## 2023-01-01 RX ADMIN — SEVELAMER CARBONATE 1600 MILLIGRAM(S): 2400 POWDER, FOR SUSPENSION ORAL at 05:43

## 2023-01-01 RX ADMIN — Medication 4: at 23:34

## 2023-01-01 RX ADMIN — CARVEDILOL PHOSPHATE 37.5 MILLIGRAM(S): 80 CAPSULE, EXTENDED RELEASE ORAL at 05:15

## 2023-01-01 RX ADMIN — Medication 4: at 12:12

## 2023-01-01 RX ADMIN — Medication 4: at 11:39

## 2023-01-01 RX ADMIN — Medication 4: at 13:39

## 2023-01-01 RX ADMIN — SODIUM CHLORIDE 4 MILLILITER(S): 9 INJECTION INTRAMUSCULAR; INTRAVENOUS; SUBCUTANEOUS at 09:47

## 2023-01-01 RX ADMIN — Medication 1 DROP(S): at 18:47

## 2023-01-01 RX ADMIN — HUMAN INSULIN 12 UNIT(S): 100 INJECTION, SUSPENSION SUBCUTANEOUS at 17:59

## 2023-01-01 RX ADMIN — Medication 3 MILLILITER(S): at 22:00

## 2023-01-01 RX ADMIN — Medication 1 APPLICATION(S): at 05:18

## 2023-01-01 RX ADMIN — Medication 40 MILLIGRAM(S): at 22:29

## 2023-01-01 RX ADMIN — Medication 10 MILLIGRAM(S): at 00:57

## 2023-01-01 RX ADMIN — MIDODRINE HYDROCHLORIDE 10 MILLIGRAM(S): 2.5 TABLET ORAL at 16:21

## 2023-01-01 RX ADMIN — Medication 1 APPLICATION(S): at 17:07

## 2023-01-01 RX ADMIN — POLYETHYLENE GLYCOL 3350 17 GRAM(S): 17 POWDER, FOR SOLUTION ORAL at 08:38

## 2023-01-01 RX ADMIN — Medication 2: at 05:04

## 2023-01-01 RX ADMIN — Medication 6 MILLIGRAM(S): at 21:22

## 2023-01-01 RX ADMIN — SODIUM CHLORIDE 2 GRAM(S): 9 INJECTION INTRAMUSCULAR; INTRAVENOUS; SUBCUTANEOUS at 05:56

## 2023-01-01 RX ADMIN — Medication 3 MILLILITER(S): at 23:02

## 2023-01-01 RX ADMIN — MIDODRINE HYDROCHLORIDE 40 MILLIGRAM(S): 2.5 TABLET ORAL at 23:28

## 2023-01-01 RX ADMIN — CALAMINE AND ZINC OXIDE AND PHENOL 1 APPLICATION(S): 160; 10 LOTION TOPICAL at 11:08

## 2023-01-01 RX ADMIN — HUMAN INSULIN 6 UNIT(S): 100 INJECTION, SUSPENSION SUBCUTANEOUS at 23:33

## 2023-01-01 RX ADMIN — Medication 250 MILLIGRAM(S): at 19:04

## 2023-01-01 RX ADMIN — DROXIDOPA 100 MILLIGRAM(S): 100 CAPSULE ORAL at 10:43

## 2023-01-01 RX ADMIN — SODIUM CHLORIDE 4 MILLILITER(S): 9 INJECTION INTRAMUSCULAR; INTRAVENOUS; SUBCUTANEOUS at 10:13

## 2023-01-01 RX ADMIN — Medication 300 MILLIGRAM(S): at 11:30

## 2023-01-01 RX ADMIN — MIDODRINE HYDROCHLORIDE 40 MILLIGRAM(S): 2.5 TABLET ORAL at 06:02

## 2023-01-01 RX ADMIN — ATORVASTATIN CALCIUM 40 MILLIGRAM(S): 80 TABLET, FILM COATED ORAL at 22:29

## 2023-01-01 RX ADMIN — SODIUM CHLORIDE 4 MILLILITER(S): 9 INJECTION INTRAMUSCULAR; INTRAVENOUS; SUBCUTANEOUS at 15:39

## 2023-01-01 RX ADMIN — BUMETANIDE 2 MILLIGRAM(S): 0.25 INJECTION INTRAMUSCULAR; INTRAVENOUS at 23:24

## 2023-01-01 RX ADMIN — Medication 3 MILLILITER(S): at 23:44

## 2023-01-01 RX ADMIN — DAPTOMYCIN 120 MILLIGRAM(S): 500 INJECTION, POWDER, LYOPHILIZED, FOR SOLUTION INTRAVENOUS at 15:57

## 2023-01-01 RX ADMIN — Medication 2: at 11:32

## 2023-01-01 RX ADMIN — APIXABAN 5 MILLIGRAM(S): 2.5 TABLET, FILM COATED ORAL at 18:47

## 2023-01-01 RX ADMIN — CHLORHEXIDINE GLUCONATE 1 APPLICATION(S): 213 SOLUTION TOPICAL at 08:12

## 2023-01-01 RX ADMIN — DROXIDOPA 200 MILLIGRAM(S): 100 CAPSULE ORAL at 05:18

## 2023-01-01 RX ADMIN — HUMAN INSULIN 12 UNIT(S): 100 INJECTION, SUSPENSION SUBCUTANEOUS at 05:21

## 2023-01-01 RX ADMIN — Medication 6: at 11:42

## 2023-01-01 RX ADMIN — POLYMYXIN B SULFATE 250 UNIT(S): 500000 INJECTION, POWDER, LYOPHILIZED, FOR SOLUTION INTRAMUSCULAR; INTRATHECAL; INTRAVENOUS; OPHTHALMIC at 05:26

## 2023-01-01 RX ADMIN — Medication 3 MILLILITER(S): at 20:31

## 2023-01-01 RX ADMIN — ATORVASTATIN CALCIUM 10 MILLIGRAM(S): 80 TABLET, FILM COATED ORAL at 23:04

## 2023-01-01 RX ADMIN — Medication 1 DROP(S): at 05:03

## 2023-01-01 RX ADMIN — MUPIROCIN 1 APPLICATION(S): 20 OINTMENT TOPICAL at 07:02

## 2023-01-01 RX ADMIN — HUMAN INSULIN 11 UNIT(S): 100 INJECTION, SUSPENSION SUBCUTANEOUS at 17:21

## 2023-01-01 RX ADMIN — Medication 3 MILLILITER(S): at 09:45

## 2023-01-01 RX ADMIN — PANTOPRAZOLE SODIUM 40 MILLIGRAM(S): 20 TABLET, DELAYED RELEASE ORAL at 06:12

## 2023-01-01 RX ADMIN — ALBUTEROL 2.5 MILLIGRAM(S): 90 AEROSOL, METERED ORAL at 08:34

## 2023-01-01 RX ADMIN — CHLORHEXIDINE GLUCONATE 1 APPLICATION(S): 213 SOLUTION TOPICAL at 06:29

## 2023-01-01 RX ADMIN — SODIUM CHLORIDE 4 MILLILITER(S): 9 INJECTION INTRAMUSCULAR; INTRAVENOUS; SUBCUTANEOUS at 20:24

## 2023-01-01 RX ADMIN — HUMAN INSULIN 4 UNIT(S): 100 INJECTION, SUSPENSION SUBCUTANEOUS at 05:28

## 2023-01-01 RX ADMIN — MUPIROCIN 1 APPLICATION(S): 20 OINTMENT TOPICAL at 19:26

## 2023-01-01 RX ADMIN — Medication 0.3 MILLIGRAM(S): at 21:31

## 2023-01-01 RX ADMIN — Medication 6: at 05:10

## 2023-01-01 RX ADMIN — DROXIDOPA 600 MILLIGRAM(S): 100 CAPSULE ORAL at 18:17

## 2023-01-01 RX ADMIN — ALBUTEROL 2.5 MILLIGRAM(S): 90 AEROSOL, METERED ORAL at 21:17

## 2023-01-01 RX ADMIN — Medication 81 MILLIGRAM(S): at 11:49

## 2023-01-01 RX ADMIN — HUMAN INSULIN 5 UNIT(S): 100 INJECTION, SUSPENSION SUBCUTANEOUS at 17:36

## 2023-01-01 RX ADMIN — Medication 3 MILLILITER(S): at 10:54

## 2023-01-01 RX ADMIN — Medication 40 MILLIEQUIVALENT(S): at 11:57

## 2023-01-01 RX ADMIN — APIXABAN 5 MILLIGRAM(S): 2.5 TABLET, FILM COATED ORAL at 05:41

## 2023-01-01 RX ADMIN — Medication 8: at 23:09

## 2023-01-01 RX ADMIN — Medication 1 APPLICATION(S): at 17:42

## 2023-01-01 RX ADMIN — MIDODRINE HYDROCHLORIDE 10 MILLIGRAM(S): 2.5 TABLET ORAL at 17:02

## 2023-01-01 RX ADMIN — ALBUTEROL 2.5 MILLIGRAM(S): 90 AEROSOL, METERED ORAL at 03:33

## 2023-01-01 RX ADMIN — Medication 1 SPRAY(S): at 05:45

## 2023-01-01 RX ADMIN — SODIUM CHLORIDE 1 GRAM(S): 9 INJECTION INTRAMUSCULAR; INTRAVENOUS; SUBCUTANEOUS at 05:23

## 2023-01-01 RX ADMIN — DROXIDOPA 600 MILLIGRAM(S): 100 CAPSULE ORAL at 06:18

## 2023-01-01 RX ADMIN — Medication 600 MILLIGRAM(S): at 00:41

## 2023-01-01 RX ADMIN — Medication 1 TABLET(S): at 11:50

## 2023-01-01 RX ADMIN — Medication 25 MILLIGRAM(S): at 05:45

## 2023-01-01 RX ADMIN — CHLORHEXIDINE GLUCONATE 1 APPLICATION(S): 213 SOLUTION TOPICAL at 11:48

## 2023-01-01 RX ADMIN — HUMAN INSULIN 6 UNIT(S): 100 INJECTION, SUSPENSION SUBCUTANEOUS at 23:26

## 2023-01-01 RX ADMIN — HUMAN INSULIN 6 UNIT(S): 100 INJECTION, SUSPENSION SUBCUTANEOUS at 17:51

## 2023-01-01 RX ADMIN — MUPIROCIN 1 APPLICATION(S): 20 OINTMENT TOPICAL at 05:46

## 2023-01-01 RX ADMIN — Medication 40 MILLIGRAM(S): at 00:59

## 2023-01-01 RX ADMIN — Medication 1 APPLICATION(S): at 13:09

## 2023-01-01 RX ADMIN — Medication 6: at 00:02

## 2023-01-01 RX ADMIN — Medication 3 MILLILITER(S): at 22:17

## 2023-01-01 RX ADMIN — Medication 1 DROP(S): at 12:19

## 2023-01-01 RX ADMIN — Medication 2: at 18:49

## 2023-01-01 RX ADMIN — Medication 2: at 11:49

## 2023-01-01 RX ADMIN — HEPARIN SODIUM 5000 UNIT(S): 5000 INJECTION INTRAVENOUS; SUBCUTANEOUS at 22:18

## 2023-01-01 RX ADMIN — PANTOPRAZOLE SODIUM 40 MILLIGRAM(S): 20 TABLET, DELAYED RELEASE ORAL at 05:25

## 2023-01-01 RX ADMIN — Medication 4 DROP(S): at 18:37

## 2023-01-01 RX ADMIN — Medication 1 APPLICATION(S): at 18:08

## 2023-01-01 RX ADMIN — Medication 325 MILLIGRAM(S): at 12:06

## 2023-01-01 RX ADMIN — Medication 125 MILLILITER(S): at 17:57

## 2023-01-01 RX ADMIN — Medication 4 DROP(S): at 17:47

## 2023-01-01 RX ADMIN — HUMAN INSULIN 6 UNIT(S): 100 INJECTION, SUSPENSION SUBCUTANEOUS at 17:55

## 2023-01-01 RX ADMIN — SODIUM CHLORIDE 1 GRAM(S): 9 INJECTION INTRAMUSCULAR; INTRAVENOUS; SUBCUTANEOUS at 17:12

## 2023-01-01 RX ADMIN — HUMAN INSULIN 3 UNIT(S): 100 INJECTION, SUSPENSION SUBCUTANEOUS at 23:32

## 2023-01-01 RX ADMIN — Medication 1 DROP(S): at 20:15

## 2023-01-01 RX ADMIN — MEROPENEM 100 MILLIGRAM(S): 1 INJECTION INTRAVENOUS at 06:29

## 2023-01-01 RX ADMIN — Medication 40 MILLIGRAM(S): at 05:46

## 2023-01-01 RX ADMIN — DIPHENHYDRAMINE HYDROCHLORIDE AND LIDOCAINE HYDROCHLORIDE AND ALUMINUM HYDROXIDE AND MAGNESIUM HYDRO 10 MILLILITER(S): KIT at 06:05

## 2023-01-01 RX ADMIN — HUMAN INSULIN 3 UNIT(S): 100 INJECTION, SUSPENSION SUBCUTANEOUS at 17:11

## 2023-01-01 RX ADMIN — PANTOPRAZOLE SODIUM 40 MILLIGRAM(S): 20 TABLET, DELAYED RELEASE ORAL at 17:58

## 2023-01-01 RX ADMIN — Medication 2: at 05:58

## 2023-01-01 RX ADMIN — BUMETANIDE 2 MILLIGRAM(S): 0.25 INJECTION INTRAMUSCULAR; INTRAVENOUS at 18:52

## 2023-01-01 RX ADMIN — Medication 1 DROP(S): at 14:11

## 2023-01-01 RX ADMIN — Medication 4 DROP(S): at 06:53

## 2023-01-01 RX ADMIN — Medication 1 DROP(S): at 06:05

## 2023-01-01 RX ADMIN — FAMOTIDINE 20 MILLIGRAM(S): 10 INJECTION INTRAVENOUS at 11:40

## 2023-01-01 RX ADMIN — Medication 3 MILLILITER(S): at 17:19

## 2023-01-01 RX ADMIN — Medication 2000 UNIT(S): at 12:00

## 2023-01-01 RX ADMIN — HUMAN INSULIN 6 UNIT(S): 100 INJECTION, SUSPENSION SUBCUTANEOUS at 05:28

## 2023-01-01 RX ADMIN — Medication 6 MILLIGRAM(S): at 19:04

## 2023-01-01 RX ADMIN — HEPARIN SODIUM 8 UNIT(S)/HR: 5000 INJECTION INTRAVENOUS; SUBCUTANEOUS at 19:06

## 2023-01-01 RX ADMIN — DROXIDOPA 600 MILLIGRAM(S): 100 CAPSULE ORAL at 22:45

## 2023-01-01 RX ADMIN — PANTOPRAZOLE SODIUM 40 MILLIGRAM(S): 20 TABLET, DELAYED RELEASE ORAL at 05:05

## 2023-01-01 RX ADMIN — ALBUTEROL 2.5 MILLIGRAM(S): 90 AEROSOL, METERED ORAL at 15:54

## 2023-01-01 RX ADMIN — VALACYCLOVIR 500 MILLIGRAM(S): 500 TABLET, FILM COATED ORAL at 16:30

## 2023-01-01 RX ADMIN — Medication 600 MILLIGRAM(S): at 22:21

## 2023-01-01 RX ADMIN — HEPARIN SODIUM 10.5 UNIT(S)/HR: 5000 INJECTION INTRAVENOUS; SUBCUTANEOUS at 01:53

## 2023-01-01 RX ADMIN — Medication 4: at 07:13

## 2023-01-01 RX ADMIN — SODIUM CHLORIDE 4 MILLILITER(S): 9 INJECTION INTRAMUSCULAR; INTRAVENOUS; SUBCUTANEOUS at 20:52

## 2023-01-01 RX ADMIN — BUMETANIDE 2 MILLIGRAM(S): 0.25 INJECTION INTRAMUSCULAR; INTRAVENOUS at 18:01

## 2023-01-01 RX ADMIN — Medication 3 MILLILITER(S): at 16:10

## 2023-01-01 RX ADMIN — Medication 11.3 MICROGRAM(S)/KG/MIN: at 19:50

## 2023-01-01 RX ADMIN — HUMAN INSULIN 3 UNIT(S): 100 INJECTION, SUSPENSION SUBCUTANEOUS at 07:08

## 2023-01-01 RX ADMIN — PROPOFOL 7.98 MICROGRAM(S)/KG/MIN: 10 INJECTION, EMULSION INTRAVENOUS at 20:19

## 2023-01-01 RX ADMIN — SODIUM CHLORIDE 4 MILLILITER(S): 9 INJECTION INTRAMUSCULAR; INTRAVENOUS; SUBCUTANEOUS at 03:25

## 2023-01-01 RX ADMIN — Medication 1 APPLICATION(S): at 12:34

## 2023-01-01 RX ADMIN — BUMETANIDE 2 MILLIGRAM(S): 0.25 INJECTION INTRAMUSCULAR; INTRAVENOUS at 10:57

## 2023-01-01 RX ADMIN — SODIUM CHLORIDE 4 MILLILITER(S): 9 INJECTION INTRAMUSCULAR; INTRAVENOUS; SUBCUTANEOUS at 15:47

## 2023-01-01 RX ADMIN — Medication 4: at 05:35

## 2023-01-01 RX ADMIN — CHLORHEXIDINE GLUCONATE 1 APPLICATION(S): 213 SOLUTION TOPICAL at 05:08

## 2023-01-01 RX ADMIN — Medication 300 MILLIGRAM(S): at 11:49

## 2023-01-01 RX ADMIN — ERYTHROPOIETIN 10000 UNIT(S): 10000 INJECTION, SOLUTION INTRAVENOUS; SUBCUTANEOUS at 13:00

## 2023-01-01 RX ADMIN — Medication 3 MILLILITER(S): at 08:56

## 2023-01-01 RX ADMIN — SODIUM CHLORIDE 4 MILLILITER(S): 9 INJECTION INTRAMUSCULAR; INTRAVENOUS; SUBCUTANEOUS at 03:28

## 2023-01-01 RX ADMIN — Medication 1 PACKET(S): at 12:15

## 2023-01-01 RX ADMIN — Medication 1 DROP(S): at 02:54

## 2023-01-01 RX ADMIN — SODIUM CHLORIDE 4 MILLILITER(S): 9 INJECTION INTRAMUSCULAR; INTRAVENOUS; SUBCUTANEOUS at 14:40

## 2023-01-01 RX ADMIN — DEXMEDETOMIDINE HYDROCHLORIDE IN 0.9% SODIUM CHLORIDE 1.66 MICROGRAM(S)/KG/HR: 4 INJECTION INTRAVENOUS at 18:59

## 2023-01-01 RX ADMIN — Medication 3 MILLILITER(S): at 09:22

## 2023-01-01 RX ADMIN — CEFEPIME 100 MILLIGRAM(S): 1 INJECTION, POWDER, FOR SOLUTION INTRAMUSCULAR; INTRAVENOUS at 16:41

## 2023-01-01 RX ADMIN — Medication 2: at 18:15

## 2023-01-01 RX ADMIN — Medication 4: at 11:50

## 2023-01-01 RX ADMIN — NICARDIPINE HYDROCHLORIDE 25 MG/HR: 30 CAPSULE, EXTENDED RELEASE ORAL at 18:58

## 2023-01-01 RX ADMIN — SODIUM CHLORIDE 4 MILLILITER(S): 9 INJECTION INTRAMUSCULAR; INTRAVENOUS; SUBCUTANEOUS at 03:33

## 2023-01-01 RX ADMIN — Medication 3 MILLILITER(S): at 22:20

## 2023-01-01 RX ADMIN — PANTOPRAZOLE SODIUM 40 MILLIGRAM(S): 20 TABLET, DELAYED RELEASE ORAL at 05:22

## 2023-01-01 RX ADMIN — MIDODRINE HYDROCHLORIDE 30 MILLIGRAM(S): 2.5 TABLET ORAL at 21:03

## 2023-01-01 RX ADMIN — BUMETANIDE 2 MILLIGRAM(S): 0.25 INJECTION INTRAMUSCULAR; INTRAVENOUS at 13:53

## 2023-01-01 RX ADMIN — Medication 400 MILLIGRAM(S): at 04:36

## 2023-01-01 RX ADMIN — LEVETIRACETAM 1000 MILLIGRAM(S): 250 TABLET, FILM COATED ORAL at 17:46

## 2023-01-01 RX ADMIN — CALAMINE AND ZINC OXIDE AND PHENOL 1 APPLICATION(S): 160; 10 LOTION TOPICAL at 11:42

## 2023-01-01 RX ADMIN — DROXIDOPA 600 MILLIGRAM(S): 100 CAPSULE ORAL at 18:08

## 2023-01-01 RX ADMIN — MIDODRINE HYDROCHLORIDE 40 MILLIGRAM(S): 2.5 TABLET ORAL at 17:52

## 2023-01-01 RX ADMIN — ATORVASTATIN CALCIUM 40 MILLIGRAM(S): 80 TABLET, FILM COATED ORAL at 21:14

## 2023-01-01 RX ADMIN — Medication 3 MILLILITER(S): at 20:38

## 2023-01-01 RX ADMIN — HEPARIN SODIUM 5000 UNIT(S): 5000 INJECTION INTRAVENOUS; SUBCUTANEOUS at 22:02

## 2023-01-01 RX ADMIN — SEVELAMER CARBONATE 1600 MILLIGRAM(S): 2400 POWDER, FOR SUSPENSION ORAL at 06:20

## 2023-01-01 RX ADMIN — HEPARIN SODIUM 9.5 UNIT(S)/HR: 5000 INJECTION INTRAVENOUS; SUBCUTANEOUS at 16:29

## 2023-01-01 RX ADMIN — PANTOPRAZOLE SODIUM 40 MILLIGRAM(S): 20 TABLET, DELAYED RELEASE ORAL at 06:26

## 2023-01-01 RX ADMIN — Medication 1 DROP(S): at 06:14

## 2023-01-01 RX ADMIN — MIDODRINE HYDROCHLORIDE 40 MILLIGRAM(S): 2.5 TABLET ORAL at 17:00

## 2023-01-01 RX ADMIN — BUMETANIDE 2 MILLIGRAM(S): 0.25 INJECTION INTRAMUSCULAR; INTRAVENOUS at 12:00

## 2023-01-01 RX ADMIN — ALBUTEROL 2.5 MILLIGRAM(S): 90 AEROSOL, METERED ORAL at 15:40

## 2023-01-01 RX ADMIN — ATORVASTATIN CALCIUM 10 MILLIGRAM(S): 80 TABLET, FILM COATED ORAL at 21:03

## 2023-01-01 RX ADMIN — DROXIDOPA 600 MILLIGRAM(S): 100 CAPSULE ORAL at 02:54

## 2023-01-01 RX ADMIN — Medication 2: at 12:18

## 2023-01-01 RX ADMIN — BUMETANIDE 2 MILLIGRAM(S): 0.25 INJECTION INTRAMUSCULAR; INTRAVENOUS at 05:10

## 2023-01-01 RX ADMIN — CHLORHEXIDINE GLUCONATE 1 APPLICATION(S): 213 SOLUTION TOPICAL at 05:15

## 2023-01-01 RX ADMIN — Medication 1 APPLICATION(S): at 06:52

## 2023-01-01 RX ADMIN — Medication 200 MILLIGRAM(S): at 23:34

## 2023-01-01 RX ADMIN — Medication 2: at 05:19

## 2023-01-01 RX ADMIN — LEVETIRACETAM 1000 MILLIGRAM(S): 250 TABLET, FILM COATED ORAL at 12:19

## 2023-01-01 RX ADMIN — DROXIDOPA 600 MILLIGRAM(S): 100 CAPSULE ORAL at 10:32

## 2023-01-01 RX ADMIN — Medication 1 DROP(S): at 18:06

## 2023-01-01 RX ADMIN — Medication 3 MILLILITER(S): at 12:20

## 2023-01-01 RX ADMIN — HEPARIN SODIUM 8.5 UNIT(S)/HR: 5000 INJECTION INTRAVENOUS; SUBCUTANEOUS at 02:57

## 2023-01-01 RX ADMIN — Medication 650 MILLIGRAM(S): at 17:34

## 2023-01-01 RX ADMIN — HEPARIN SODIUM 5000 UNIT(S): 5000 INJECTION INTRAVENOUS; SUBCUTANEOUS at 05:45

## 2023-01-01 RX ADMIN — Medication 3 MILLILITER(S): at 21:51

## 2023-01-01 RX ADMIN — Medication 300 MILLIGRAM(S): at 12:23

## 2023-01-01 RX ADMIN — Medication 125 MILLILITER(S): at 23:39

## 2023-01-01 RX ADMIN — SODIUM CHLORIDE 1 GRAM(S): 9 INJECTION INTRAMUSCULAR; INTRAVENOUS; SUBCUTANEOUS at 05:46

## 2023-01-01 RX ADMIN — Medication 4: at 18:03

## 2023-01-01 RX ADMIN — MUPIROCIN 1 APPLICATION(S): 20 OINTMENT TOPICAL at 05:48

## 2023-01-01 RX ADMIN — Medication 3 MILLILITER(S): at 11:43

## 2023-01-01 RX ADMIN — Medication 6: at 13:12

## 2023-01-01 RX ADMIN — CHLORHEXIDINE GLUCONATE 1 APPLICATION(S): 213 SOLUTION TOPICAL at 05:27

## 2023-01-01 RX ADMIN — NICARDIPINE HYDROCHLORIDE 25 MG/HR: 30 CAPSULE, EXTENDED RELEASE ORAL at 12:15

## 2023-01-01 RX ADMIN — Medication 3 MILLILITER(S): at 05:38

## 2023-01-01 RX ADMIN — Medication 0.5 MILLIGRAM(S): at 17:46

## 2023-01-01 RX ADMIN — HYDROMORPHONE HYDROCHLORIDE 0.5 MILLIGRAM(S): 2 INJECTION INTRAMUSCULAR; INTRAVENOUS; SUBCUTANEOUS at 02:01

## 2023-01-01 RX ADMIN — MIDODRINE HYDROCHLORIDE 40 MILLIGRAM(S): 2.5 TABLET ORAL at 18:08

## 2023-01-01 RX ADMIN — DROXIDOPA 600 MILLIGRAM(S): 100 CAPSULE ORAL at 18:35

## 2023-01-01 RX ADMIN — Medication 6: at 17:52

## 2023-01-01 RX ADMIN — Medication 1 APPLICATION(S): at 20:38

## 2023-01-01 RX ADMIN — SODIUM CHLORIDE 1 GRAM(S): 9 INJECTION INTRAMUSCULAR; INTRAVENOUS; SUBCUTANEOUS at 18:42

## 2023-01-01 RX ADMIN — MUPIROCIN 1 APPLICATION(S): 20 OINTMENT TOPICAL at 17:28

## 2023-01-01 RX ADMIN — SEVELAMER CARBONATE 1600 MILLIGRAM(S): 2400 POWDER, FOR SUSPENSION ORAL at 21:02

## 2023-01-01 RX ADMIN — SODIUM CHLORIDE 4 MILLILITER(S): 9 INJECTION INTRAMUSCULAR; INTRAVENOUS; SUBCUTANEOUS at 16:28

## 2023-01-01 RX ADMIN — HUMAN INSULIN 3 UNIT(S): 100 INJECTION, SUSPENSION SUBCUTANEOUS at 17:36

## 2023-01-01 RX ADMIN — SODIUM CHLORIDE 4 MILLILITER(S): 9 INJECTION INTRAMUSCULAR; INTRAVENOUS; SUBCUTANEOUS at 22:38

## 2023-01-01 RX ADMIN — MIDODRINE HYDROCHLORIDE 40 MILLIGRAM(S): 2.5 TABLET ORAL at 05:20

## 2023-01-01 RX ADMIN — Medication 4 DROP(S): at 05:50

## 2023-01-01 RX ADMIN — Medication 0.1 MILLIGRAM(S): at 05:38

## 2023-01-01 RX ADMIN — CALAMINE AND ZINC OXIDE AND PHENOL 1 APPLICATION(S): 160; 10 LOTION TOPICAL at 12:16

## 2023-01-01 RX ADMIN — Medication 1 DROP(S): at 10:36

## 2023-01-01 RX ADMIN — Medication 8 MILLIGRAM(S): at 21:37

## 2023-01-01 RX ADMIN — Medication 1 DROP(S): at 18:16

## 2023-01-01 RX ADMIN — SODIUM CHLORIDE 4 MILLILITER(S): 9 INJECTION INTRAMUSCULAR; INTRAVENOUS; SUBCUTANEOUS at 19:50

## 2023-01-01 RX ADMIN — HUMAN INSULIN 3 UNIT(S): 100 INJECTION, SUSPENSION SUBCUTANEOUS at 17:28

## 2023-01-01 RX ADMIN — Medication 1 DROP(S): at 10:10

## 2023-01-01 RX ADMIN — ALBUTEROL 2.5 MILLIGRAM(S): 90 AEROSOL, METERED ORAL at 20:07

## 2023-01-01 RX ADMIN — INSULIN GLARGINE 12 UNIT(S): 100 INJECTION, SOLUTION SUBCUTANEOUS at 21:56

## 2023-01-01 RX ADMIN — SODIUM CHLORIDE 1 GRAM(S): 9 INJECTION INTRAMUSCULAR; INTRAVENOUS; SUBCUTANEOUS at 05:52

## 2023-01-01 RX ADMIN — Medication 1 DROP(S): at 10:34

## 2023-01-01 RX ADMIN — Medication 1 APPLICATION(S): at 19:30

## 2023-01-01 RX ADMIN — ALBUTEROL 2.5 MILLIGRAM(S): 90 AEROSOL, METERED ORAL at 15:52

## 2023-01-01 RX ADMIN — DROXIDOPA 600 MILLIGRAM(S): 100 CAPSULE ORAL at 02:13

## 2023-01-01 RX ADMIN — ERYTHROPOIETIN 10000 UNIT(S): 10000 INJECTION, SOLUTION INTRAVENOUS; SUBCUTANEOUS at 07:53

## 2023-01-01 RX ADMIN — BUMETANIDE 10 MG/HR: 0.25 INJECTION INTRAMUSCULAR; INTRAVENOUS at 19:31

## 2023-01-01 RX ADMIN — DROXIDOPA 200 MILLIGRAM(S): 100 CAPSULE ORAL at 09:43

## 2023-01-01 RX ADMIN — Medication 1 APPLICATION(S): at 13:53

## 2023-01-01 RX ADMIN — SODIUM CHLORIDE 4 MILLILITER(S): 9 INJECTION INTRAMUSCULAR; INTRAVENOUS; SUBCUTANEOUS at 15:33

## 2023-01-01 RX ADMIN — Medication 1 DROP(S): at 21:30

## 2023-01-01 RX ADMIN — HUMAN INSULIN 3 UNIT(S): 100 INJECTION, SUSPENSION SUBCUTANEOUS at 12:04

## 2023-01-01 RX ADMIN — FENTANYL CITRATE 50 MICROGRAM(S): 50 INJECTION INTRAVENOUS at 08:58

## 2023-01-01 RX ADMIN — HEPARIN SODIUM 5000 UNIT(S): 5000 INJECTION INTRAVENOUS; SUBCUTANEOUS at 14:19

## 2023-01-01 RX ADMIN — CHLORHEXIDINE GLUCONATE 15 MILLILITER(S): 213 SOLUTION TOPICAL at 18:03

## 2023-01-01 RX ADMIN — DIPHENHYDRAMINE HYDROCHLORIDE AND LIDOCAINE HYDROCHLORIDE AND ALUMINUM HYDROXIDE AND MAGNESIUM HYDRO 10 MILLILITER(S): KIT at 12:12

## 2023-01-01 RX ADMIN — Medication 650 MILLIGRAM(S): at 21:14

## 2023-01-01 RX ADMIN — MIDODRINE HYDROCHLORIDE 30 MILLIGRAM(S): 2.5 TABLET ORAL at 22:49

## 2023-01-01 RX ADMIN — Medication 4: at 12:16

## 2023-01-01 RX ADMIN — PANTOPRAZOLE SODIUM 40 MILLIGRAM(S): 20 TABLET, DELAYED RELEASE ORAL at 18:09

## 2023-01-01 RX ADMIN — MIDODRINE HYDROCHLORIDE 20 MILLIGRAM(S): 2.5 TABLET ORAL at 15:06

## 2023-01-01 RX ADMIN — Medication 3 MILLILITER(S): at 21:32

## 2023-01-01 RX ADMIN — ALBUTEROL 2.5 MILLIGRAM(S): 90 AEROSOL, METERED ORAL at 10:24

## 2023-01-01 RX ADMIN — Medication 30 MILLIGRAM(S): at 05:13

## 2023-01-01 RX ADMIN — HUMAN INSULIN 3 UNIT(S): 100 INJECTION, SUSPENSION SUBCUTANEOUS at 17:49

## 2023-01-01 RX ADMIN — Medication 1 DROP(S): at 10:13

## 2023-01-01 RX ADMIN — ALBUTEROL 2.5 MILLIGRAM(S): 90 AEROSOL, METERED ORAL at 23:03

## 2023-01-01 RX ADMIN — Medication 2: at 23:35

## 2023-01-01 RX ADMIN — DROXIDOPA 600 MILLIGRAM(S): 100 CAPSULE ORAL at 03:28

## 2023-01-01 RX ADMIN — SODIUM CHLORIDE 250 MILLILITER(S): 9 INJECTION INTRAMUSCULAR; INTRAVENOUS; SUBCUTANEOUS at 12:12

## 2023-01-01 RX ADMIN — HUMAN INSULIN 5 UNIT(S): 100 INJECTION, SUSPENSION SUBCUTANEOUS at 17:23

## 2023-01-01 RX ADMIN — Medication 1: at 09:03

## 2023-01-01 RX ADMIN — Medication 1 DROP(S): at 11:45

## 2023-01-01 RX ADMIN — SODIUM CHLORIDE 4 MILLILITER(S): 9 INJECTION INTRAMUSCULAR; INTRAVENOUS; SUBCUTANEOUS at 03:20

## 2023-01-01 RX ADMIN — DROXIDOPA 200 MILLIGRAM(S): 100 CAPSULE ORAL at 05:37

## 2023-01-01 RX ADMIN — HUMAN INSULIN 6 UNIT(S): 100 INJECTION, SUSPENSION SUBCUTANEOUS at 05:21

## 2023-01-01 RX ADMIN — APIXABAN 5 MILLIGRAM(S): 2.5 TABLET, FILM COATED ORAL at 17:19

## 2023-01-01 RX ADMIN — Medication 4: at 11:46

## 2023-01-01 RX ADMIN — Medication 1 DROP(S): at 05:43

## 2023-01-01 RX ADMIN — HEPARIN SODIUM 10.5 UNIT(S)/HR: 5000 INJECTION INTRAVENOUS; SUBCUTANEOUS at 20:41

## 2023-01-01 RX ADMIN — Medication 3 MILLILITER(S): at 11:17

## 2023-01-01 RX ADMIN — Medication 1 DROP(S): at 21:21

## 2023-01-01 RX ADMIN — SEVELAMER CARBONATE 1600 MILLIGRAM(S): 2400 POWDER, FOR SUSPENSION ORAL at 22:20

## 2023-01-01 RX ADMIN — Medication 100 MILLIGRAM(S): at 15:56

## 2023-01-01 RX ADMIN — HEPARIN SODIUM 5000 UNIT(S): 5000 INJECTION INTRAVENOUS; SUBCUTANEOUS at 22:55

## 2023-01-01 RX ADMIN — Medication 3 MILLILITER(S): at 07:51

## 2023-01-01 RX ADMIN — Medication 1 DROP(S): at 18:12

## 2023-01-01 RX ADMIN — HUMAN INSULIN 6 UNIT(S): 100 INJECTION, SUSPENSION SUBCUTANEOUS at 05:17

## 2023-01-01 RX ADMIN — HUMAN INSULIN 14 UNIT(S): 100 INJECTION, SUSPENSION SUBCUTANEOUS at 11:50

## 2023-01-01 RX ADMIN — Medication 1 DROP(S): at 05:48

## 2023-01-01 RX ADMIN — ALBUTEROL 2.5 MILLIGRAM(S): 90 AEROSOL, METERED ORAL at 03:28

## 2023-01-01 RX ADMIN — Medication 250 MILLIGRAM(S): at 06:31

## 2023-01-01 RX ADMIN — Medication 6: at 23:33

## 2023-01-01 RX ADMIN — Medication 1 SPRAY(S): at 17:32

## 2023-01-01 RX ADMIN — MIDODRINE HYDROCHLORIDE 40 MILLIGRAM(S): 2.5 TABLET ORAL at 11:17

## 2023-01-01 RX ADMIN — HUMAN INSULIN 3 UNIT(S): 100 INJECTION, SUSPENSION SUBCUTANEOUS at 17:52

## 2023-01-01 RX ADMIN — Medication 3 MILLILITER(S): at 16:00

## 2023-01-01 RX ADMIN — MIDODRINE HYDROCHLORIDE 30 MILLIGRAM(S): 2.5 TABLET ORAL at 05:27

## 2023-01-01 RX ADMIN — ATORVASTATIN CALCIUM 40 MILLIGRAM(S): 80 TABLET, FILM COATED ORAL at 21:07

## 2023-01-01 RX ADMIN — HUMAN INSULIN 1 UNIT(S): 100 INJECTION, SUSPENSION SUBCUTANEOUS at 05:11

## 2023-01-01 RX ADMIN — Medication 3 MILLILITER(S): at 15:01

## 2023-01-01 RX ADMIN — Medication 6: at 05:20

## 2023-01-01 RX ADMIN — HUMAN INSULIN 6 UNIT(S): 100 INJECTION, SUSPENSION SUBCUTANEOUS at 23:04

## 2023-01-01 RX ADMIN — SEVELAMER CARBONATE 1600 MILLIGRAM(S): 2400 POWDER, FOR SUSPENSION ORAL at 17:04

## 2023-01-01 RX ADMIN — SODIUM CHLORIDE 4 MILLILITER(S): 9 INJECTION INTRAMUSCULAR; INTRAVENOUS; SUBCUTANEOUS at 11:13

## 2023-01-01 RX ADMIN — ALBUTEROL 2.5 MILLIGRAM(S): 90 AEROSOL, METERED ORAL at 03:53

## 2023-01-01 RX ADMIN — Medication 1 DROP(S): at 01:04

## 2023-01-29 NOTE — SWALLOW BEDSIDE ASSESSMENT ADULT - SWALLOW EVAL: SECRETION MANAGEMENT
baseline cough
wet vocal quality at baseline, cued cough improved vocal quality however + dysphonia evident.
baseline wheezing/baseline cough
Spontaneous, unlabored and symmetrical

## 2023-04-17 PROBLEM — I63.9 STROKE: Status: ACTIVE | Noted: 2021-07-19

## 2023-05-24 NOTE — DIETITIAN INITIAL EVALUATION ADULT. - IDEAL BODY WEIGHT (KG)
[facility]  [address] [address]  Dept: [phone]  Dept Fax: [phone]    Behavioral Health Services Progress Note      Patient:  Yony Dupree    :  2003    Date of Service:  2023    The encounter diagnosis was Generalized anxiety disorder.    50 minutes were spent with the patient in a video encounter.    Data and Progress toward goal(s) and objective(s):  Pt presented to appt on time and over video. He shared that he has been feeling better mood and has recently moved which is feeling very relieving and exciting. Pt spoke about new changes and feeling excited and positive about this. Pt spoke more about work and summer plans.     Intervention:  Cognitive Behavioral Strategies and Psychoeducation    Patient continues to be involved in service planning:  YES    Describe above interventions:  Provided unconditional positive regard and listened with empathy. Spoke with pt about how he wants to continue taking care of himself while working in the summer. Spoke about ways to reframe negative thoughts and held space to talk about changes in his life recently. Encouraged pt to continue being mindful of his stress and level of anxiety and spoke about not avoiding thoughts for too long.     Patient's response to interventions:  Pt agreed to use skills to breathe deeply and take things one step at a time especially when anxiety feels overwhelming. Pt agreed to weekly therapy.    Continue to support patient's efforts and progress towards established treatment plan goals in the following ways:  Continue to process relationship with mom. Continue to listen with empathy and validate pt's emotions. Check in on anxiety and med management.      Amy De La O LPC    This visit is being performed virtually via Video visit using [employer].   Clinical Location: [facility]  Yony's location Home and is physically  present in   the [facility] at the time of this visit.    45.3

## 2023-07-12 NOTE — ED PROVIDER NOTE - PHYSICAL EXAMINATION
CONSTITUTIONAL: Patient is awake, alert, chronically ill appearing,   HEAD: NCAT  ENT: Airway patent, Nasal mucosa clear.  NECK: Supple,   LUNGS: coughing, (+) rhonci   HEART: RRR.+S1S2   ABDOMEN: Soft, non-tender to palpation throughout all four quadrants,  MSK: edema b/l LE:   SKIN: No rash or lesions  NEURO: No focal deficits,

## 2023-07-12 NOTE — H&P ADULT - HISTORY OF PRESENT ILLNESS
74 y/o female with DM, HTN, breast CA, respiratory arrest and cardiac arrest (2018), CVA with residual weakness, chronic cough, presents to the ED with  complaining of worsening cough. As per family patient has chronic cough that she has been seeing pulmonology for. Past few days  states that she is having worsening cough with productive green sputum. He states that she is unable to lay flat in the bed due to shortness of breath. She also has worsening edema to b/l LE. Denies any headache, fever, chills, chest pain, abdominal pain, n/v/d, dysuria. Unable to obtain any history or ROS due to mental status. 74 y/o female with multiple medical problems whom i see in my practice on outptn basis.    Ptn has h/o DM, HTN, breast CA, respiratory arrest and cardiac arrest (2018), CVA with residual weakness, chronic cough, presents to the ED with  complaining of worsening cough.   As per family patient has chronic cough that she has been seeing pulmonologist Dr. Almendarez for a while. Past few days  states that she is having worsening cough with productive green sputum. He states that she is unable to lay flat in the bed due to shortness of breath. She also has worsening edema to b/l LE. Denies any headache, fever, chills, chest pain, abdominal pain, n/v/d, dysuria. Unable to obtain any history or ROS due to mental status. 72 y/o female with multiple medical problems whom i see in my practice on outptn basis. last home visit marichuy 15   Ptn has h/o DM, HTN, breast CA, respiratory arrest and cardiac arrest (2018), CVA with residual weakness, h/o trache, PEG, both removed, now eating on her own but aspirates and has post prandial cough in addition to chronic productive cough. presumed 2/2 silent aspiration of her saliva this was d/w Dr. Jose Almendarez who is in agreement    Today ptn presents to the ED with  complaining of worsening cough and orthopnea. ptn has chronic LE edema.   Denies any headache, fever, chills, chest pain, abdominal pain, n/v/d, dysuria. Unable to obtain any history or ROS due to mental status.

## 2023-07-12 NOTE — H&P ADULT - ASSESSMENT
72 y/o female with multiple medical problems whom i see in my practice on outptn basis. last home visit marichuy 15   Ptn has h/o DM, HTN, breast CA, respiratory arrest and cardiac arrest (2018), CVA with residual weakness, h/o trache, PEG, both removed, now eating on her own but aspirates and has post prandial cough in addition to chronic productive cough. presumed 2/2 silent aspiration of her saliva this was d/w Dr. Jose Almendarez who is in agreement    Today ptn presents to the ED with  complaining of worsening cough and orthopnea. ptn has chronic LE edema.   Denies any headache, fever, chills, chest pain, abdominal pain, n/v/d, dysuria. Unable to obtain any history or ROS due to mental status.    A/P  Aspiration PNA, cannot R/O BRYAN. check sputum culture  acute on chronic CHF  chronic aspiration  ptn has multiple ABx allergies, will start on Cipro and flagyl which she tolerated in the past  cont outptn meds  get S&S eval  insulin on a sliding scale  ENT eval, may need FEEST  RUSS, check bladder sono, renal sono  card, renal, pulm, endo ID called  cont outptn meds  dvt ppx w HSC Non-Graft Cartilage Fenestration Text: The cartilage was fenestrated with a 2mm punch biopsy to help facilitate healing.

## 2023-07-12 NOTE — ED ADULT NURSE REASSESSMENT NOTE - NS ED NURSE REASSESS COMMENT FT1
Pt f/s 151, pt given 14 units of insulin as per MD order. Pt family at the bedside feeding patient food from home, Ok with MD.

## 2023-07-12 NOTE — H&P ADULT - NSHPPHYSICALEXAM_GEN_ALL_CORE
T(C): 36.9 (07-12-23 @ 19:26), Max: 36.9 (07-12-23 @ 11:29)  HR: 66 (07-12-23 @ 19:26) (63 - 75)  BP: 196/68 (07-12-23 @ 19:26) (163/60 - 196/68)  RR: 22 (07-12-23 @ 19:26) (18 - 22)  SpO2: 97% (07-12-23 @ 19:26) (94% - 98%)    PHYSICAL EXAM:  GENERAL: NAD, well-developed  HEAD:  Atraumatic, Normocephalic  EYES: EOMI, PERRLA, conjunctiva and sclera clear  NECK: Supple, No JVD  CHEST/LUNG: Clear to auscultation bilaterally; No wheeze  HEART: Regular rate and rhythm; No murmurs, rubs, or gallops  ABDOMEN: Soft, Nontender, Nondistended; Bowel sounds present  EXTREMITIES:  2+ Peripheral Pulses, No clubbing, cyanosis, or edema  PSYCH: AAOx3  NEUROLOGY: non-focal  SKIN: No rashes or lesions

## 2023-07-12 NOTE — ED PROVIDER NOTE - OBJECTIVE STATEMENT
72 y/o female with DM, HTN, breast CA, respiratory arrest and cardiac arrest (2018), CVA with residual weakness, chronic cough, presents to the ED with  complaining of worsening cough. As per family patient has chronic cough that she has been seeing pulmonology for. Past few days  states that she is having worsening cough with productive green sputum. He states that she is unable to lay flat in the bed due to shortness of breath. She also has worsening edema to b/l LE. Denies any headache, fever, chills, chest pain, abdominal pain, n/v/d, dysuria. Unable to obtain any history or ROS due to mental status.

## 2023-07-12 NOTE — ED ADULT NURSE REASSESSMENT NOTE - NS ED NURSE REASSESS COMMENT FT1
Pt turned, changed and repositioned in bed with family at the bedside. Pt placed on purewick. Pt tolerated well

## 2023-07-12 NOTE — ED ADULT NURSE NOTE - NSFALLHARMRISKINTERV_ED_ALL_ED
Assistance OOB with selected safe patient handling equipment if applicable/Assistance with ambulation/Communicate risk of Fall with Harm to all staff, patient, and family/Monitor gait and stability/Provide visual cue: red socks, yellow wristband, yellow gown, etc/Reinforce activity limits and safety measures with patient and family/Bed in lowest position, wheels locked, appropriate side rails in place/Call bell, personal items and telephone in reach/Instruct patient to call for assistance before getting out of bed/chair/stretcher/Non-slip footwear applied when patient is off stretcher/[place] to call system/Physically safe environment - no spills, clutter or unnecessary equipment/Purposeful Proactive Rounding/Room/bathroom lighting operational, light cord in reach

## 2023-07-12 NOTE — ED PROVIDER NOTE - PROGRESS NOTE DETAILS
Called and spoke to patients cardiologist Dr. Vicente and discussed case. He recc admission to the hospital for IV diuresis. Discussed this with patient,  and daughter at bedside. They do not want patient to be admitted to the hospital because they feel that they can provide better care at home. I discussed at length the benefits of staying in the hospital and the risks of taking there mother home which include serious illness and/or death. They will discuss options amongst themselves and let us know decision. Nery Mckinley PA-C Family agreeable with admission. Dr. Vicente requests admission to Dr. Norton. Nery Mckinley PA-C Oralia Liang MD  Patient signed out to me by Dr. Latham pending labs.  Patient has evidence of congestive heart failure and renal insufficiency.  I evaluated the patient.  I spoke to the patient and  and the daughter at the bedside and the patient is going to be admitted for further treatment

## 2023-07-12 NOTE — ED ADULT NURSE NOTE - OBJECTIVE STATEMENT
74 y/o F PMH stroke in 2018, CAD, HTN, breast CA s/p mastectomy with right sided lymph node involvement presented to ED c/o cough x2 weeks. Family at bedside to provide much of history due to language barrier. Per family, pt has had a productive cough x2 weeks with green sputum present, states they feel coughing is worse when laying flat, family feels that pt is whispering when she tries to speak and have noticed that she is snoring more when sleeping. Of note, family noticed increased swelling in lower extremities. Pt endorses some chest discomfort with coughs, pt breathing spontaneously without nasal flaring or use of accessory muscles, satting 94-95% on room air. Pt A&Ox4, appropriate safety measures in place, call bell within reach, family at bedside.

## 2023-07-12 NOTE — ED PROVIDER NOTE - CLINICAL SUMMARY MEDICAL DECISION MAKING FREE TEXT BOX
72 y/o female with DM, HTN, breast CA, respiratory arrest and cardiac arrest (2018), CVA with residual weakness, chronic cough, presents to the ED with  complaining of worsening cough. As per family patient has chronic cough that she has been seeing pulmonology for. Past few days  states that she is having worsening cough with productive green sputum. He states that she is unable to lay flat in the bed due to shortness of breath. She also has worsening edema to b/l LE. Denies any headache, fever, chills, chest pain, abdominal pain, n/v/d, dysuria. Unable to obtain any history or ROS due to mental status.    RRR, no M/R/G, equal pulses bilaterally.  Tachypneic, diffuse rhonchi on exam.  2+ pitting edema midway up tib/fib    Plan will send labs, trop, proBNP, cxr, RVP and likely admit for diuresis.

## 2023-07-13 NOTE — CONSULT NOTE ADULT - PROBLEM SELECTOR RECOMMENDATION 9
Multifactorial, likely aspiration PNA and diastolic heart failure   Cont with Lasix 40 IV bid   replete K > 4   IV abx   Mucinex  Duoneb nebulizers
Will start Lantus 15 u at bedtime.  Will start Admelog 6  u before each meal and continue Admelog correction scale coverage. Will continue monitoring FS and FU.  Patient counseled for compliance with consistent low carb diet and exercise as tolerated outpatient.

## 2023-07-13 NOTE — CONSULT NOTE ADULT - PROBLEM SELECTOR RECOMMENDATION 2
aspiration precautions   ASA
Suggest to continue medications, monitoring, FU primary team recommendations.

## 2023-07-13 NOTE — SWALLOW BEDSIDE ASSESSMENT ADULT - H & P REVIEW
74 y/o female with multiple medical problems whom i see in my practice on outptn basis. last home visit marichuy 15. Ptn has h/o DM, HTN, breast CA, respiratory arrest and cardiac arrest (2018), CVA with residual weakness, h/o trache, PEG, both removed, now eating on her own but aspirates and has post prandial cough in addition to chronic productive cough. presumed 2/2 silent aspiration of her saliva this was d/w Dr. Jose Almendarez who is in agreement. Today ptn presents to the ED with  complaining of worsening cough and orthopnea. ptn has chronic LE edema. Denies any headache, fever, chills, chest pain, abdominal pain, n/v/d, dysuria. Unable to obtain any history or ROS due to mental status. Admitted with concern for aspiraton PNA, cannot r/o BRYAN, chronic aspiration, recommendations for S&S eval./yes

## 2023-07-13 NOTE — CONSULT NOTE ADULT - ASSESSMENT
MILANA BALL is a 72yo Female with a PMH significant for Latent TB, Aspiration 2/2 CVA, DM, CHF, and BC who is here for management of respiratory distress in the setting of possible Aspiration pneumonia and CHF exacerbation.    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   - Hx of aspiration 2/2 CVA, patient eats regular diet at home; speech eval pending   Recommendations:  - Isolate Patient w/ Airborne precaution  - 3 sets of Afb smear & Sputum Cx w/ at least 1 in the morning,   - Blood Cx   - Sputum Cx  -     T(F): 97.9 (07-13-23 @ 05:03)  HR: 68 (07-13-23 @ 05:03)  BP: 180/70 (07-13-23 @ 05:03)  RR: 20 (07-13-23 @ 05:03)  SpO2: 98% (07-13-23 @ 05:03)    ciprofloxacin   IVPB 200 milliGRAM(s) IV Intermittent every 12 hours  metroNIDAZOLE  IVPB 500 milliGRAM(s) IV Intermittent every 8 hours      ==========================================================  ==========================================================  ============INCOMPLETE NOTE; PATIENT TO BE SEEN TODAY=============  ==========================================================  ==========================================================             MILANA BALL is a 72yo Female with a PMH significant for Latent TB, Aspiration 2/2 CVA, DM, CHF, and BC who is here for management of respiratory distress in the setting of possible Aspiration pneumonia and CHF exacerbation.    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   - Hx of aspiration 2/2 CVA, patient eats regular diet at home; speech eval pending   Recommendations:  - Isolate Patient w/ Airborne precaution  - 3 sets of Afb smear & Sputum Cx in the morning,   - Blood Cx   - Sputum Cx  -     T(F): 97.9 (07-13-23 @ 05:03)  HR: 68 (07-13-23 @ 05:03)  BP: 180/70 (07-13-23 @ 05:03)  RR: 20 (07-13-23 @ 05:03)  SpO2: 98% (07-13-23 @ 05:03)    ciprofloxacin   IVPB 200 milliGRAM(s) IV Intermittent every 12 hours  metroNIDAZOLE  IVPB 500 milliGRAM(s) IV Intermittent every 8 hours      ==========================================================  ==========================================================  ============INCOMPLETE NOTE; PATIENT TO BE SEEN TODAY=============  ==========================================================  ==========================================================             MILANA BALL is a 72yo Female with a PMH significant for Latent TB, Aspiration 2/2 CVA, DM, CHF, and BC who is here for management of respiratory distress in the setting of possible Aspiration pneumonia and CHF exacerbation.    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  Recommendations:  - Isolate Patient w/ Airborne precaution  - Afb smear & Sputum Cx in the morning 3 consecutive days   - Obtain Blood Cx   - Obtain Sputum Cx  - Obtain HIV Test   - Switch Cipro and Flagyl to Cefepime 1g Q24 monotherapy.     I have discussed the patient with attending physician Dr. Durand who agrees with the above assessment and plan.     Torey Munroe MD ([facility])  Internal Medicine PGY-1

## 2023-07-13 NOTE — SWALLOW BEDSIDE ASSESSMENT ADULT - SLP GENERAL OBSERVATIONS
Pt received upright in stretcher, coughing at baseline, hoarse vocal quality, suspected from frequent coughing; and reports being mildly tired and eyes closing when clinician speaking to spouse, but quickly opening eyes and engaging in conversation/activity when spoken to. Aox2-3, +2L O2, following majority simple directions, verbally making wants/needs known. Pt and spouse at bedside expressing preference for speaking English. Spouse reporting pt with 1x year of a baseline cough, but with exacerbation in past 2 weeks +new wheezing +new phlegm. Spouse reporting some difficulty with water, reporting mild holding/"slow swallow", but no prior coughing with food or liquids. Spouse reporting prior CVA (LPCA stroke) in 1/30/2018, PEG removal 5/16/2018, trach removal 5/18/2018. Pt received upright in stretcher, coughing at baseline, hoarse vocal quality but no wetness noted, suspected from frequent coughing; and reports being mildly tired and eyes closing when clinician speaking to spouse, but quickly opening eyes and engaging in conversation/activity when spoken to. Aox2-3, +2L O2, following majority simple directions, verbally making wants/needs known. Pt and spouse at bedside expressing preference for speaking English. Spouse reporting pt with 1x year of a baseline cough, but with exacerbation in past 2 weeks +new wheezing +new phlegm. Spouse reporting some difficulty with water, reporting mild holding/"slow swallow", but no prior coughing with food or liquids. Spouse reporting prior CVA (LPCA stroke) in 1/30/2018, PEG removal 5/16/2018, trach removal 5/18/2018.

## 2023-07-13 NOTE — CONSULT NOTE ADULT - SUBJECTIVE AND OBJECTIVE BOX
HPI: Ms. Barraza is a 73 year-old woman with history of multiple medical issues including hypertension, type 2 diabetes mellitus, stroke, breast cancer s/p bilateral mastectomy, and cardiac arrest s/p trach/PEG +reversal. She is known to aspirate food as well as saliva, with an associated chronic productive cough. She presented yesterday to the [facility] ER with cough and associated shortness of breath. In the ER she received Lasix 20mg IV x 1. She was noted on admission to have azotemia with a creatinine of 2.07mg/dL; in light of this, a renal consultation was requested. In    I see that at present for hypertension she is on Coreg 37.5mg po bid, and Lasix 40mg IV bid (up from 40mg po qd at home).       PAST MEDICAL & SURGICAL HISTORY:  HTN  HLD  DM2  CVA  Breast CA - b/l mastectomy  Cardiac arrest 2018==>trach/PEG (both now removed)    MEDICATIONS  (STANDING):  aspirin enteric coated 81 milliGRAM(s) Oral daily  atorvastatin 10 milliGRAM(s) Oral at bedtime  buDESOnide    Inhalation Suspension 0.5 milliGRAM(s) Inhalation every 12 hours  carvedilol 37.5 milliGRAM(s) Oral every 12 hours  cholecalciferol 2000 Unit(s) Oral daily  dextrose 5%. 1000 milliLiter(s) (50 mL/Hr) IV Continuous <Continuous>  dextrose 5%. 1000 milliLiter(s) (100 mL/Hr) IV Continuous <Continuous>  dextrose 50% Injectable 25 Gram(s) IV Push once  dextrose 50% Injectable 12.5 Gram(s) IV Push once  dextrose 50% Injectable 25 Gram(s) IV Push once  donepezil 10 milliGRAM(s) Oral at bedtime  doxazosin 4 milliGRAM(s) Oral at bedtime  furosemide   Injectable 40 milliGRAM(s) IV Push every 12 hours  glucagon  Injectable 1 milliGRAM(s) IntraMuscular once  insulin lispro (ADMELOG) corrective regimen sliding scale   SubCutaneous three times a day before meals  letrozole 2.5 milliGRAM(s) Oral daily  multivitamin 1 Tablet(s) Oral daily      Allergies  hydrALAZINE (Rash)  vitamin E (Short breath; Urticaria; Hives)  NIFEdipine (Urticaria; Hives)  doxycycline (Rash)  nafcillin (Unknown)  isoniazid (Rash)    SOCIAL HISTORY:  Denies ETOh,Smoking,     FAMILY HISTORY:  No pertinent family history in first degree relatives    REVIEW OF SYSTEMS:  CONSTITUTIONAL: No weakness, fevers or chills  EYES/ENT: No visual changes;  No vertigo or throat pain   NECK: No pain or stiffness  RESPIRATORY: No cough, wheezing, hemoptysis; No shortness of breath  CARDIOVASCULAR: No chest pain or palpitations  GASTROINTESTINAL: No abdominal or epigastric pain. No nausea, vomiting, or hematemesis; No diarrhea or constipation. No melena or hematochezia.  GENITOURINARY: No dysuria, frequency or hematuria  NEUROLOGICAL: No numbness or weakness  SKIN: No itching, burning, rashes, or lesions   All other review of systems is negative unless indicated above.    VITAL:  T(C): , Max: 36.9 (07-12-23 @ 11:29)  T(F): , Max: 98.5 (07-12-23 @ 19:26)  HR: 68 (07-13-23 @ 05:03)  BP: 180/70 (07-13-23 @ 05:03)  RR: 20 (07-13-23 @ 05:03)  SpO2: 98% (07-13-23 @ 05:03)  Wt(kg): --    PHYSICAL EXAM:  Constitutional: NAD, Alert  HEENT: NCAT, MMM  Neck: Supple, No JVD  Respiratory: CTA-b/l  Cardiovascular: RRR s1s2, no m/r/g  Gastrointestinal: BS+, soft, NT/ND  Extremities: No peripheral edema b/l  Neurological: no focal deficits; strength grossly intact  Back: no CVAT b/l  Skin: No rashes, no nevi    LABS:                        9.6    7.88  )-----------( 229      ( 12 Jul 2023 16:17 )             31.9     Na(138)/K(5.1)/Cl(104)/HCO3(23)/BUN(35)/Cr(2.07)Glu(120)/Ca(8.9)/Mg(2.7)/PO4(--)    07-12 @ 16:17    Urinalysis Basic - ( 12 Jul 2023 16:17 )  Color: x / Appearance: x / SG: x / pH: x  Gluc: 120 mg/dL / Ketone: x  / Bili: x / Urobili: x   Blood: x / Protein: x / Nitrite: x   Leuk Esterase: x / RBC: x / WBC x   Sq Epi: x / Non Sq Epi: x / Bacteria: x    (1/14/22) - BUN/creatinine: 22/1.39    IMAGING:  < from: US Kidney and Bladder (07.12.23 @ 22:59) >  Right kidney: 9.2 cm in length. No hydronephrosis. Increased   echogenicity, reflecting partial disease.  Left kidney: 8.5 cm in length. No hydronephrosis. Increased echogenicity,   reflecting partial disease.  Bladder: Partially distended.      ASSESSMENT:      RECOMMEND:      Thank you for involving [facility] in this patient's care.    With warm regards,    Jimmy Colon MD   [facility]  Office: [phone]  Cell: [phone]             HPI: Ms. Barraza is a 73 year-old woman with history of multiple medical issues including hypertension, type 2 diabetes mellitus, stroke, breast cancer s/p bilateral mastectomy, and cardiac arrest s/p trach/PEG +reversal. She is known to aspirate food as well as saliva, with an associated chronic productive cough. She presented yesterday to the [facility] ER with cough and associated shortness of breath. In the ER she received Lasix 20mg IV x 1. She was noted on admission to have azotemia with a creatinine of 2.07mg/dL; in light of this, a renal consultation was requested. In    I see that at present for hypertension she is on Coreg 37.5mg po bid, and Lasix 40mg IV bid (up from 40mg po qd at home). Her BP since admission has been 180s-190s/60-70s      PAST MEDICAL & SURGICAL HISTORY:  HTN  HLD  DM2  CVA  Breast CA - b/l mastectomy  Cardiac arrest 2018==>trach/PEG (both now removed)    MEDICATIONS  (STANDING):  aspirin enteric coated 81 milliGRAM(s) Oral daily  atorvastatin 10 milliGRAM(s) Oral at bedtime  buDESOnide    Inhalation Suspension 0.5 milliGRAM(s) Inhalation every 12 hours  carvedilol 37.5 milliGRAM(s) Oral every 12 hours  cholecalciferol 2000 Unit(s) Oral daily  dextrose 5%. 1000 milliLiter(s) (50 mL/Hr) IV Continuous <Continuous>  dextrose 5%. 1000 milliLiter(s) (100 mL/Hr) IV Continuous <Continuous>  dextrose 50% Injectable 25 Gram(s) IV Push once  dextrose 50% Injectable 12.5 Gram(s) IV Push once  dextrose 50% Injectable 25 Gram(s) IV Push once  donepezil 10 milliGRAM(s) Oral at bedtime  doxazosin 4 milliGRAM(s) Oral at bedtime  furosemide   Injectable 40 milliGRAM(s) IV Push every 12 hours  glucagon  Injectable 1 milliGRAM(s) IntraMuscular once  insulin lispro (ADMELOG) corrective regimen sliding scale   SubCutaneous three times a day before meals  letrozole 2.5 milliGRAM(s) Oral daily  multivitamin 1 Tablet(s) Oral daily      Allergies  hydrALAZINE (Rash)  vitamin E (Short breath; Urticaria; Hives)  NIFEdipine (Urticaria; Hives)  doxycycline (Rash)  nafcillin (Unknown)  isoniazid (Rash)    SOCIAL HISTORY:  Denies ETOh,Smoking,     FAMILY HISTORY:  No pertinent family history in first degree relatives    REVIEW OF SYSTEMS:  CONSTITUTIONAL: No weakness, fevers or chills  EYES/ENT: No visual changes;  No vertigo or throat pain   NECK: No pain or stiffness  RESPIRATORY: No cough, wheezing, hemoptysis; No shortness of breath  CARDIOVASCULAR: No chest pain or palpitations  GASTROINTESTINAL: No abdominal or epigastric pain. No nausea, vomiting, or hematemesis; No diarrhea or constipation. No melena or hematochezia.  GENITOURINARY: No dysuria, frequency or hematuria  NEUROLOGICAL: No numbness or weakness  SKIN: No itching, burning, rashes, or lesions   All other review of systems is negative unless indicated above.    VITAL:  T(C): , Max: 36.9 (07-12-23 @ 11:29)  T(F): , Max: 98.5 (07-12-23 @ 19:26)  HR: 68 (07-13-23 @ 05:03)  BP: 180/70 (07-13-23 @ 05:03)  RR: 20 (07-13-23 @ 05:03)  SpO2: 98% (07-13-23 @ 05:03)        PHYSICAL EXAM:  Constitutional: NAD, Alert  HEENT: NCAT, MMM  Neck: Supple, No JVD  Respiratory: CTA-b/l  Cardiovascular: RRR s1s2, no m/r/g  Gastrointestinal: BS+, soft, NT/ND  Extremities: No peripheral edema b/l  Neurological: no focal deficits; strength grossly intact  Back: no CVAT b/l  Skin: No rashes, no nevi    LABS:                        9.6    7.88  )-----------( 229      ( 12 Jul 2023 16:17 )             31.9     Na(138)/K(5.1)/Cl(104)/HCO3(23)/BUN(35)/Cr(2.07)Glu(120)/Ca(8.9)/Mg(2.7)/PO4(--)    07-12 @ 16:17    (6/6/23) - UA - 100glu, 300prot, small blood; prot/cr 340/27 (12.5g/g)  (6/1/21) - UA - 100glu, 300prot, no blood  (6/1/23) - BUN/creatinine: 34/2.00, A1c 8.1  (3/27/23) - BUN/creatinine: 39/1.90  (1/19/23) - BUN/creatinine: 30/1.63  (5/19/22) - BUN/creatinine: 32/1.66  (1/14/22) - BUN/creatinine: 22/1.39    IMAGING:  < from: US Kidney and Bladder (07.12.23 @ 22:59) >  Right kidney: 9.2 cm in length. No hydronephrosis. Increased   echogenicity, reflecting partial disease.  Left kidney: 8.5 cm in length. No hydronephrosis. Increased echogenicity,   reflecting partial disease.  Bladder: Partially distended.      ASSESSMENT:  (1)Renal - CKD4 with nephrotic-range proteinuria - highly likely from diabetic nephropathy. At/near baseline GFR  (2)CV - severe hypertension - could benefit from eventual ACEI/ARB for renoprotection, provided that we can keep the serum potassium at a reasonable level  (3)Hyperkalemia - mild  (4)SOB - aspiration pneumonia/pneumonitis    RECOMMEND:  (1)Low-K diet  (2)Add Hydralazine 25mg po q8h for now; switch over to low dose ARB once K+ trends below 5  (3)Diuretics as ordered for now  (4)Dose new meds for GFR 20-30ml/min  (5)F/U Pulmonary input    Thank you for involving [facility] in this patient's care.    With warm regards,    Jimmy Colon MD   [facility]  Office: [phone]  Cell: [phone]             HPI: Ms. Barraza is a 73 year-old woman with history of multiple medical issues including hypertension, type 2 diabetes mellitus, stroke, breast cancer s/p bilateral mastectomy, and cardiac arrest s/p trach/PEG +reversal. She is known to aspirate food as well as saliva, with an associated chronic productive cough. She presented yesterday to the [facility] ER with cough and associated shortness of breath. In the ER she received Lasix 20mg IV x 1. She was noted on admission to have azotemia with a creatinine of 2.07mg/dL; in light of this, a renal consultation was requested. In    I see that at present for hypertension she is on Coreg 37.5mg po bid, and Lasix 40mg IV bid (up from 40mg po qd at home). Her BP since admission has been 180s-190s/60-70s      PAST MEDICAL & SURGICAL HISTORY:  HTN  HLD  DM2  CVA  Breast CA - b/l mastectomy  Cardiac arrest 2018==>trach/PEG (both now removed)    MEDICATIONS  (STANDING):  aspirin enteric coated 81 milliGRAM(s) Oral daily  atorvastatin 10 milliGRAM(s) Oral at bedtime  buDESOnide    Inhalation Suspension 0.5 milliGRAM(s) Inhalation every 12 hours  carvedilol 37.5 milliGRAM(s) Oral every 12 hours  cholecalciferol 2000 Unit(s) Oral daily  dextrose 5%. 1000 milliLiter(s) (50 mL/Hr) IV Continuous <Continuous>  dextrose 5%. 1000 milliLiter(s) (100 mL/Hr) IV Continuous <Continuous>  dextrose 50% Injectable 25 Gram(s) IV Push once  dextrose 50% Injectable 12.5 Gram(s) IV Push once  dextrose 50% Injectable 25 Gram(s) IV Push once  donepezil 10 milliGRAM(s) Oral at bedtime  doxazosin 4 milliGRAM(s) Oral at bedtime  furosemide   Injectable 40 milliGRAM(s) IV Push every 12 hours  glucagon  Injectable 1 milliGRAM(s) IntraMuscular once  insulin lispro (ADMELOG) corrective regimen sliding scale   SubCutaneous three times a day before meals  letrozole 2.5 milliGRAM(s) Oral daily  multivitamin 1 Tablet(s) Oral daily      Allergies  hydrALAZINE (Rash)  vitamin E (Short breath; Urticaria; Hives)  NIFEdipine (Urticaria; Hives)  doxycycline (Rash)  nafcillin (Unknown)  isoniazid (Rash)    SOCIAL HISTORY:  Denies ETOh,Smoking,     FAMILY HISTORY:  No pertinent family history in first degree relatives    REVIEW OF SYSTEMS:  CONSTITUTIONAL: No weakness, fevers or chills  EYES/ENT: No visual changes;  No vertigo or throat pain   NECK: No pain or stiffness  RESPIRATORY: No cough, wheezing, hemoptysis; No shortness of breath  CARDIOVASCULAR: No chest pain or palpitations  GASTROINTESTINAL: No abdominal or epigastric pain. No nausea, vomiting, or hematemesis; No diarrhea or constipation. No melena or hematochezia.  GENITOURINARY: No dysuria, frequency or hematuria  NEUROLOGICAL: No numbness or weakness  SKIN: No itching, burning, rashes, or lesions   All other review of systems is negative unless indicated above.    VITAL:  T(C): , Max: 36.9 (07-12-23 @ 11:29)  T(F): , Max: 98.5 (07-12-23 @ 19:26)  HR: 68 (07-13-23 @ 05:03)  BP: 180/70 (07-13-23 @ 05:03)  RR: 20 (07-13-23 @ 05:03)  SpO2: 98% (07-13-23 @ 05:03)        PHYSICAL EXAM:  Constitutional: NAD, Alert  HEENT: NCAT, MMM  Neck: Supple, No JVD  Respiratory: CTA-b/l  Cardiovascular: RRR s1s2, no m/r/g  Gastrointestinal: BS+, soft, NT/ND  Extremities: No peripheral edema b/l  Neurological: no focal deficits; strength grossly intact  Back: no CVAT b/l  Skin: No rashes, no nevi    LABS:                        9.6    7.88  )-----------( 229      ( 12 Jul 2023 16:17 )             31.9     Na(138)/K(5.1)/Cl(104)/HCO3(23)/BUN(35)/Cr(2.07)Glu(120)/Ca(8.9)/Mg(2.7)/PO4(--)    07-12 @ 16:17    (6/6/23) - UA - 100glu, 300prot, small blood; prot/cr 340/27 (12.5g/g)  (6/1/21) - UA - 100glu, 300prot, no blood  (6/1/23) - BUN/creatinine: 34/2.00, A1c 8.1  (3/27/23) - BUN/creatinine: 39/1.90  (1/19/23) - BUN/creatinine: 30/1.63  (5/19/22) - BUN/creatinine: 32/1.66  (1/14/22) - BUN/creatinine: 22/1.39    IMAGING:  < from: US Kidney and Bladder (07.12.23 @ 22:59) >  Right kidney: 9.2 cm in length. No hydronephrosis. Increased   echogenicity, reflecting partial disease.  Left kidney: 8.5 cm in length. No hydronephrosis. Increased echogenicity,   reflecting partial disease.  Bladder: Partially distended.      ASSESSMENT:  (1)Renal - CKD4 with nephrotic-range proteinuria - highly likely from diabetic nephropathy. At/near baseline GFR - could benefit from placement on SGLT2i  (2)CV - severe hypertension - could benefit from eventual ACEI/ARB for renoprotection, provided that we can keep the serum potassium at a reasonable level. Allergic to CCB and Hydralazine.  (3)Hyperkalemia - mild - indicated for low-K diet and for one dose of Lokelma as we introduce ARB  (4)SOB - aspiration pneumonia/pneumonitis    RECOMMEND:  (1)Low-K diet  (2)Lokelma 5gm po x 1  (3)Add Losartan 25mg po qd  (4)Add Farxiga 10mg po qd  (5)Diuretics as ordered for now  (6)Dose new meds for GFR 20-30ml/min  (7)F/U Pulmonary input    Thank you for involving [facility] in this patient's care.    With warm regards,    Jimmy Colon MD   [facility]  Office: [phone]  Cell: [phone]             HPI: Ms. Barraza is a 73 year-old woman with history of multiple medical issues including hypertension, type 2 diabetes mellitus, stroke, breast cancer s/p bilateral mastectomy, and cardiac arrest s/p trach/PEG +reversal. She is known to aspirate food as well as saliva, with an associated chronic productive cough. She presented yesterday to the [facility] ER with cough and associated shortness of breath. In the ER she received Lasix 20mg IV x 1. She was noted on admission to have azotemia with a creatinine of 2.07mg/dL; in light of this, a renal consultation was requested. In    I see that at present for hypertension she is on Coreg 37.5mg po bid, Doxazosin 4mg po qhs, and Lasix 40mg IV bid (up from 40mg po qd at home). She is allergic to dihydropyridine calcium channel blockers as well as Hydralazine. Her BP since admission has been 180s-190s/60-70s.      PAST MEDICAL & SURGICAL HISTORY:  HTN  HLD  DM2  CVA  Breast CA - b/l mastectomy  Cardiac arrest 2018==>trach/PEG (both now removed)    MEDICATIONS  (STANDING):  aspirin enteric coated 81 milliGRAM(s) Oral daily  atorvastatin 10 milliGRAM(s) Oral at bedtime  buDESOnide    Inhalation Suspension 0.5 milliGRAM(s) Inhalation every 12 hours  carvedilol 37.5 milliGRAM(s) Oral every 12 hours  cholecalciferol 2000 Unit(s) Oral daily  dextrose 5%. 1000 milliLiter(s) (50 mL/Hr) IV Continuous <Continuous>  dextrose 5%. 1000 milliLiter(s) (100 mL/Hr) IV Continuous <Continuous>  dextrose 50% Injectable 25 Gram(s) IV Push once  dextrose 50% Injectable 12.5 Gram(s) IV Push once  dextrose 50% Injectable 25 Gram(s) IV Push once  donepezil 10 milliGRAM(s) Oral at bedtime  doxazosin 4 milliGRAM(s) Oral at bedtime  furosemide   Injectable 40 milliGRAM(s) IV Push every 12 hours  glucagon  Injectable 1 milliGRAM(s) IntraMuscular once  insulin lispro (ADMELOG) corrective regimen sliding scale   SubCutaneous three times a day before meals  letrozole 2.5 milliGRAM(s) Oral daily  multivitamin 1 Tablet(s) Oral daily      Allergies  hydrALAZINE (Rash)  vitamin E (Short breath; Urticaria; Hives)  NIFEdipine (Urticaria; Hives)  doxycycline (Rash)  nafcillin (Unknown)  isoniazid (Rash)    SOCIAL HISTORY:  Denies ETOh,Smoking,     FAMILY HISTORY:  No pertinent family history in first degree relatives    REVIEW OF SYSTEMS:  CONSTITUTIONAL: No weakness, fevers or chills  EYES/ENT: No visual changes;  No vertigo or throat pain   NECK: No pain or stiffness  RESPIRATORY: No cough, wheezing, hemoptysis; No shortness of breath  CARDIOVASCULAR: No chest pain or palpitations  GASTROINTESTINAL: No abdominal or epigastric pain. No nausea, vomiting, or hematemesis; No diarrhea or constipation. No melena or hematochezia.  GENITOURINARY: No dysuria, frequency or hematuria  NEUROLOGICAL: No numbness or weakness  SKIN: No itching, burning, rashes, or lesions   All other review of systems is negative unless indicated above.    VITAL:  T(C): , Max: 36.9 (07-12-23 @ 11:29)  T(F): , Max: 98.5 (07-12-23 @ 19:26)  HR: 68 (07-13-23 @ 05:03)  BP: 180/70 (07-13-23 @ 05:03)  RR: 20 (07-13-23 @ 05:03)  SpO2: 98% (07-13-23 @ 05:03)        PHYSICAL EXAM:  Constitutional: NAD, Alert  HEENT: NCAT, MMM  Neck: Supple, No JVD  Respiratory: CTA-b/l  Cardiovascular: RRR s1s2, no m/r/g  Gastrointestinal: BS+, soft, NT/ND  Extremities: No peripheral edema b/l  Neurological: no focal deficits; strength grossly intact  Back: no CVAT b/l  Skin: No rashes, no nevi    LABS:                        9.6    7.88  )-----------( 229      ( 12 Jul 2023 16:17 )             31.9     Na(138)/K(5.1)/Cl(104)/HCO3(23)/BUN(35)/Cr(2.07)Glu(120)/Ca(8.9)/Mg(2.7)/PO4(--)    07-12 @ 16:17    (6/6/23) - UA - 100glu, 300prot, small blood; prot/cr 340/27 (12.5g/g)  (6/1/21) - UA - 100glu, 300prot, no blood  (6/1/23) - BUN/creatinine: 34/2.00, A1c 8.1  (3/27/23) - BUN/creatinine: 39/1.90  (1/19/23) - BUN/creatinine: 30/1.63  (5/19/22) - BUN/creatinine: 32/1.66  (1/14/22) - BUN/creatinine: 22/1.39    IMAGING:  < from: US Kidney and Bladder (07.12.23 @ 22:59) >  Right kidney: 9.2 cm in length. No hydronephrosis. Increased   echogenicity, reflecting partial disease.  Left kidney: 8.5 cm in length. No hydronephrosis. Increased echogenicity,   reflecting partial disease.  Bladder: Partially distended.      ASSESSMENT:  (1)Renal - CKD4 with nephrotic-range proteinuria - highly likely from diabetic nephropathy. At/near baseline GFR - could benefit from placement on SGLT2i  (2)CV - severe hypertension - could benefit from eventual ACEI/ARB for renoprotection, provided that we can keep the serum potassium at a reasonable level. Allergic to CCB and Hydralazine.  (3)Hyperkalemia - mild - indicated for low-K diet and for one dose of Lokelma as we introduce ARB  (4)SOB - aspiration pneumonia/pneumonitis    RECOMMEND:  (1)Low-K diet  (2)Lokelma 5gm po x 1  (3)Add Losartan 25mg po qd  (4)Add Farxiga 10mg po qd  (5)Diuretics as ordered for now  (6)Dose new meds for GFR 20-30ml/min  (7)F/U Pulmonary input    Thank you for involving [facility] in this patient's care.    With warm regards,    Jimmy Colon MD   [facility]  Office: [phone]  Cell: [phone]             HPI: Ms. Barraza is a 73 year-old woman with history of multiple medical issues including hypertension, type 2 diabetes mellitus, stroke, breast cancer s/p bilateral mastectomy, and cardiac arrest s/p trach/PEG +reversal. She is known to aspirate food as well as saliva, with an associated chronic productive cough. She presented yesterday to the [facility] ER with cough and associated shortness of breath. In the ER she received Lasix 20mg IV x 1. She was noted on admission to have azotemia with a creatinine of 2.07mg/dL; in light of this, a renal consultation was requested. In    I see that at present for hypertension she is on Coreg 37.5mg po bid, Doxazosin 4mg po qhs, and Lasix 40mg IV bid (up from 40mg po qd at home). She is allergic to dihydropyridine calcium channel blockers as well as Hydralazine. Her BP since admission has been 180s-190s/60-70s.    History provided largely by son (bedside). He shares that she has known CKD, and that she follows with nephrologist Dr. Cem Neely as outpatient.    PAST MEDICAL & SURGICAL HISTORY:  HTN  HLD  DM2  CVA  Breast CA - b/l mastectomy  Cardiac arrest 2018==>trach/PEG (both now removed)    MEDICATIONS  (STANDING):  aspirin enteric coated 81 milliGRAM(s) Oral daily  atorvastatin 10 milliGRAM(s) Oral at bedtime  buDESOnide    Inhalation Suspension 0.5 milliGRAM(s) Inhalation every 12 hours  carvedilol 37.5 milliGRAM(s) Oral every 12 hours  cholecalciferol 2000 Unit(s) Oral daily  dextrose 5%. 1000 milliLiter(s) (50 mL/Hr) IV Continuous <Continuous>  dextrose 5%. 1000 milliLiter(s) (100 mL/Hr) IV Continuous <Continuous>  dextrose 50% Injectable 25 Gram(s) IV Push once  dextrose 50% Injectable 12.5 Gram(s) IV Push once  dextrose 50% Injectable 25 Gram(s) IV Push once  donepezil 10 milliGRAM(s) Oral at bedtime  doxazosin 4 milliGRAM(s) Oral at bedtime  furosemide   Injectable 40 milliGRAM(s) IV Push every 12 hours  glucagon  Injectable 1 milliGRAM(s) IntraMuscular once  insulin lispro (ADMELOG) corrective regimen sliding scale   SubCutaneous three times a day before meals  letrozole 2.5 milliGRAM(s) Oral daily  multivitamin 1 Tablet(s) Oral daily      Allergies  hydrALAZINE (Rash)  vitamin E (Short breath; Urticaria; Hives)  NIFEdipine (Urticaria; Hives)  doxycycline (Rash)  nafcillin (Unknown)  isoniazid (Rash)    SOCIAL HISTORY:  Denies ETOh,Smoking,     FAMILY HISTORY:  No pertinent family history in first degree relatives    REVIEW OF SYSTEMS:  CONSTITUTIONAL: No weakness, fevers or chills  EYES/ENT: No visual changes;  No vertigo or throat pain   NECK: No pain or stiffness  RESPIRATORY: (+)cough, (+)phlegm, (+)SOB  CARDIOVASCULAR: No chest pain or palpitations  GASTROINTESTINAL: No abdominal or epigastric pain. No nausea, vomiting, or hematemesis; No diarrhea or constipation. No melena or hematochezia.  GENITOURINARY: No dysuria, frequency or hematuria  NEUROLOGICAL: No numbness or weakness  SKIN: No itching, burning, rashes, or lesions   All other review of systems is negative unless indicated above.    VITAL:  T(C): , Max: 36.9 (07-12-23 @ 11:29)  T(F): , Max: 98.5 (07-12-23 @ 19:26)  HR: 68 (07-13-23 @ 05:03)  BP: 180/70 (07-13-23 @ 05:03)  RR: 20 (07-13-23 @ 05:03)  SpO2: 98% (07-13-23 @ 05:03)        PHYSICAL EXAM:  Constitutional: continuously coughing up mucosy phlegm  HEENT: NCAT, MMM  Neck: Supple, No JVD  Respiratory: CTA-b/l  Cardiovascular: RRR s1s2, no m/r/g  Gastrointestinal: BS+, soft, NT/ND  Extremities: No peripheral edema b/l  Neurological: no focal deficits; strength grossly intact  Back: no CVAT b/l  Skin: No rashes, no nevi    LABS:                        9.6    7.88  )-----------( 229      ( 12 Jul 2023 16:17 )             31.9     Na(138)/K(5.1)/Cl(104)/HCO3(23)/BUN(35)/Cr(2.07)Glu(120)/Ca(8.9)/Mg(2.7)/PO4(--)    07-12 @ 16:17    (6/6/23) - UA - 100glu, 300prot, small blood; prot/cr 340/27 (12.5g/g)  (6/1/21) - UA - 100glu, 300prot, no blood  (6/1/23) - BUN/creatinine: 34/2.00, A1c 8.1  (3/27/23) - BUN/creatinine: 39/1.90  (1/19/23) - BUN/creatinine: 30/1.63  (5/19/22) - BUN/creatinine: 32/1.66  (1/14/22) - BUN/creatinine: 22/1.39    IMAGING:  < from: US Kidney and Bladder (07.12.23 @ 22:59) >  Right kidney: 9.2 cm in length. No hydronephrosis. Increased   echogenicity, reflecting partial disease.  Left kidney: 8.5 cm in length. No hydronephrosis. Increased echogenicity,   reflecting partial disease.  Bladder: Partially distended.      ASSESSMENT:  (1)Renal - CKD4 with nephrotic-range proteinuria - highly likely from diabetic nephropathy. At/near baseline GFR - could benefit from placement on SGLT2i  (2)CV - severe hypertension - could benefit from eventual ACEI/ARB for renoprotection, provided that we can keep the serum potassium at a reasonable level. Allergic to CCB and Hydralazine.  (3)Hyperkalemia - mild - indicated for low-K diet and for one dose of Lokelma as we introduce ARB  (4)SOB - aspiration pneumonia/pneumonitis    RECOMMEND:  (1)Low-K diet  (2)Lokelma 5gm po x 1  (3)Add Losartan 25mg po qd  (4)Add Farxiga 10mg po qd  (5)Diuretics as ordered for now  (6)Dose new meds for GFR 20-30ml/min  (7)F/U Pulmonary input    Thank you for involving [facility] in this patient's care.    With warm regards,    Jimmy Colon MD   [facility]  Office: [phone]  Cell: [phone]

## 2023-07-13 NOTE — CONSULT NOTE ADULT - ASSESSMENT
72 y/o female with multiple medical problems whom i see in my practice on outptn basis. last home visit marichuy 15   Ptn has h/o DM, HTN, breast CA, respiratory arrest and cardiac arrest (2018), CVA with residual weakness, h/o trache, PEG, both removed, now eating on her own but aspirates and has post prandial cough in addition to chronic productive cough  seen in my office two weeks with worsening leg swelling   started lasix

## 2023-07-13 NOTE — CONSULT NOTE ADULT - ASSESSMENT
72 y/o female with multiple medical problems whom i see in my practice on outptn basis. last home visit marichuy 15   Ptn has h/o DM, HTN, breast CA, respiratory arrest and cardiac arrest (2018), CVA with residual weakness, h/o trache, PEG, both removed, now eating on her own but aspirates and has post prandial cough in addition to chronic productive cough. presumed 2/2 silent aspiration of her saliva this was d/w Dr. Jose Almendarez who is in agreement    Today pnt  presents to the ED with  complaining of worsening cough and orthopnea. ptn has chronic LE edema.   Denies any headache, fever, chills, chest pain, abdominal pain, n/v/d, dysuria. Unable to obtain any history or ROS due to mental status.    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 and hs is from highly endemic area for tb  -agree with ruling out tb  -if fluoroquinolones can be avoided,  that would be better:  however ID is following:   -She had cyst in TUL on previous 2019 ct scan chest  : but  this cavity seems to be in a different location to me:   -she likely is aspirating too : and speech and swallow should be done: antibiotics per iD given lot of allergies:   -she has mediastinal lymphadenopathy also : needs to be followed up    -she is using vest management at home:  will start again   acute on chronic CHF  -echo noted:   -defer to primary cards team   RUSS   -she likely has ac on chr CKD:  -she always had high creat before:   HTN  -her blood pressure is slightly had:   -cont to optimize anti hypertensives:   DM  monitor and control :    dvt prophylaxis  dw acp

## 2023-07-13 NOTE — SWALLOW BEDSIDE ASSESSMENT ADULT - SWALLOW EVAL: RECOMMENDED FEEDING/EATING TECHNIQUES
allow for swallow between intakes/crush medication (when feasible)/hard swallow w/ each bite or sip/maintain upright posture during/after eating for 30 mins/no straws/oral hygiene/position upright (90 degrees)/small sips/bites

## 2023-07-13 NOTE — CONSULT NOTE ADULT - SUBJECTIVE AND OBJECTIVE BOX
HPI:  72 y/o female with multiple medical problems whom i see in my practice on outptn basis. last home visit marichuy 15   Ptn has h/o DM, HTN, breast CA, respiratory arrest and cardiac arrest (2018), CVA with residual weakness, h/o trache, PEG, both removed, now eating on her own but aspirates and has post prandial cough in addition to chronic productive cough. presumed 2/2 silent aspiration of her saliva this was d/w Dr. Jose Almendarez who is in agreement    Today ptn presents to the ED with  complaining of worsening cough and orthopnea. ptn has chronic LE edema.   Denies any headache, fever, chills, chest pain, abdominal pain, n/v/d, dysuria. Unable to obtain any history or ROS due to mental status.   (12 Jul 2023 19:28)      Patient has history of diabetes, A1C  pending, was on mixed insulin at home.   Endo was consulted for glycemic control.      PAST MEDICAL & SURGICAL HISTORY:  Breast CA      Diabetes      Stroke      Cardiac arrest      HTN (hypertension)      H/O mastectomy, bilateral          FAMILY HISTORY:  No pertinent family history in first degree relatives        Social History:            HOME MEDICATIONS:  Home Medications:  albuterol nebulizer solution: inhale twice daily as needed; dose unknown (12 Jul 2023 20:10)  aspirin 81 mg oral delayed release tablet: 1 tab(s) orally once a day (12 Jul 2023 20:09)  atorvastatin 10 mg oral tablet: 1 tab(s) orally once a day (12 Jul 2023 20:13)  budesonide 0.5 mg/2 mL inhalation suspension: 2 milliliter(s) by nebulizer once a day (12 Jul 2023 20:13)  carvedilol 12.5 mg oral tablet: 3 tab(s) orally 2 times a day (12 Jul 2023 20:13)  cholecalciferol 50 mcg (2000 intl units) oral tablet: 1 tab(s) orally once a day (12 Jul 2023 20:13)  ciclopirox 8% topical solution: Apply topically to affected area once a day (at bedtime) (12 Jul 2023 20:13)  donepezil 10 mg oral tablet: 1 tab(s) orally once a day (at bedtime) (12 Jul 2023 20:13)  doxazosin: 6mg twice a day (12 Jul 2023 20:13)  formoterol 20 mcg/2 mL inhalation solution: 2 milliliter(s) inhaled once a day (12 Jul 2023 20:13)  furosemide 40 mg oral tablet: 1 tab(s) orally once a day (12 Jul 2023 20:13)  HumuLIN 70/30 subcutaneous suspension: 24 units in the morning and 14 units in the evening (12 Jul 2023 20:13)  ipratropium 42 mcg/inh (0.06%) nasal spray: 1 spray(s) intranasally 2 times a day (12 Jul 2023 20:13)  Januvia 25 mg oral tablet: 1 tab(s) orally once a day (12 Jul 2023 20:13)  letrozole 2.5 mg oral tablet: 1 tab(s) orally once a day (12 Jul 2023 20:13)  Multiple Vitamins oral tablet: 1 tab(s) orally once a day (12 Jul 2023 20:13)  PreserVision AREDS 2 oral capsule: 1 cap(s) orally once a day (12 Jul 2023 20:09)  sodium chloride nebulizer solution: inhale once daily; dose unknown (12 Jul 2023 20:10)            MEDICATIONS  (STANDING):  aspirin enteric coated 81 milliGRAM(s) Oral daily  atorvastatin 10 milliGRAM(s) Oral at bedtime  buDESOnide    Inhalation Suspension 0.5 milliGRAM(s) Inhalation every 12 hours  carvedilol 37.5 milliGRAM(s) Oral every 12 hours  cholecalciferol 2000 Unit(s) Oral daily  ciprofloxacin   IVPB 200 milliGRAM(s) IV Intermittent every 12 hours  cloNIDine 0.1 milliGRAM(s) Oral two times a day  dextrose 5%. 1000 milliLiter(s) (100 mL/Hr) IV Continuous <Continuous>  dextrose 5%. 1000 milliLiter(s) (50 mL/Hr) IV Continuous <Continuous>  dextrose 50% Injectable 25 Gram(s) IV Push once  dextrose 50% Injectable 25 Gram(s) IV Push once  dextrose 50% Injectable 12.5 Gram(s) IV Push once  donepezil 10 milliGRAM(s) Oral at bedtime  doxazosin 8 milliGRAM(s) Oral at bedtime  furosemide   Injectable 40 milliGRAM(s) IV Push every 12 hours  glucagon  Injectable 1 milliGRAM(s) IntraMuscular once  insulin glargine Injectable (LANTUS) 15 Unit(s) SubCutaneous at bedtime  insulin lispro (ADMELOG) corrective regimen sliding scale   SubCutaneous three times a day before meals  insulin lispro Injectable (ADMELOG) 6 Unit(s) SubCutaneous three times a day before meals  letrozole 2.5 milliGRAM(s) Oral daily  metroNIDAZOLE  IVPB 500 milliGRAM(s) IV Intermittent every 8 hours  Nephro-stacia 1 Tablet(s) Oral daily  sodium zirconium cyclosilicate 5 Gram(s) Oral once    MEDICATIONS  (PRN):  dextrose Oral Gel 15 Gram(s) Oral once PRN Blood Glucose LESS THAN 70 milliGRAM(s)/deciliter      Allergies    hydrALAZINE (Rash)  vitamin E (Short breath; Urticaria; Hives)  NIFEdipine (Urticaria; Hives)  doxycycline (Rash)  nafcillin (Unknown)  isoniazid (Rash)    Intolerances        Review of Systems:  Neuro: No HA, no dizziness  Cardiovascular: No chest pain, no palpitations  Respiratory: no SOB, no cough  GI: No nausea, vomiting, abdominal pain  MSK: Denies joint/muscle pain      ALL OTHER SYSTEMS REVIEWED AND NEGATIVE        PHYSICAL EXAM:  VITALS: T(C): 36.6 (07-13-23 @ 05:03)  T(F): 97.9 (07-13-23 @ 05:03), Max: 98.5 (07-12-23 @ 19:26)  HR: 68 (07-13-23 @ 05:03) (63 - 75)  BP: 180/70 (07-13-23 @ 05:03) (163/60 - 196/68)  RR:  (18 - 22)  SpO2:  (94% - 100%)  Wt(kg): --  GENERAL: NAD, well-groomed, well-developed  NEURO:  alert and oriented  RESPIRATORY: Clear to auscultation bilaterally; No rales, rhonchi, wheezing  CARDIOVASCULAR: Si S2  GI: Soft, non distended, normal bowel sounds  MUSCULOSKELETAL: Moves all extremities equally       POCT Blood Glucose.: 179 mg/dL (07-13-23 @ 07:33)  POCT Blood Glucose.: 151 mg/dL (07-12-23 @ 19:37)                            9.6    7.88  )-----------( 229      ( 12 Jul 2023 16:17 )             31.9       07-12    138  |  104  |  35<H>  ----------------------------<  120<H>  5.1   |  23  |  2.07<H>    eGFR: 25<L>    Ca    8.9      07-12  Mg     2.7     07-12    TPro  7.8  /  Alb  2.7<L>  /  TBili  0.4  /  DBili  x   /  AST  28  /  ALT  20  /  AlkPhos  119  07-12      Thyroid Function Tests:    Diet, Minced and Moist:   Mildly Thick Liquids (MILDTHICKLIQS)  No Concentrated Potassium (07-13-23 @ 07:52) [Active]                             HPI:  74 y/o female with multiple medical problems whom i see in my practice on outptn basis. last home visit marichuy 15   Ptn has h/o DM, HTN, breast CA, respiratory arrest and cardiac arrest (2018), CVA with residual weakness, h/o trache, PEG, both removed, now eating on her own but aspirates and  has post prandial cough in addition to chronic productive cough. presumed 2/2 silent aspiration of her saliva this was d/w Dr. Jose Almendarez who is in agreement    Today ptn presents to the ED with  complaining of worsening cough and orthopnea. ptn has chronic LE edema.   Denies any headache, fever, chills, chest pain, abdominal pain, n/v/d, dysuria. Unable to obtain any history or ROS due to mental status.   (12 Jul 2023 19:28)      Patient has history of diabetes, A1C  pending, was on mixed insulin at home.   Endo was consulted for glycemic control.      PAST MEDICAL & SURGICAL HISTORY:  Breast CA      Diabetes      Stroke      Cardiac arrest      HTN (hypertension)      H/O mastectomy, bilateral          FAMILY HISTORY:  No pertinent family history in first degree relatives        Social History:            HOME MEDICATIONS:  Home Medications:  albuterol nebulizer solution: inhale twice daily as needed; dose unknown (12 Jul 2023 20:10)  aspirin 81 mg oral delayed release tablet: 1 tab(s) orally once a day (12 Jul 2023 20:09)  atorvastatin 10 mg oral tablet: 1 tab(s) orally once a day (12 Jul 2023 20:13)  budesonide 0.5 mg/2 mL inhalation suspension: 2 milliliter(s) by nebulizer once a day (12 Jul 2023 20:13)  carvedilol 12.5 mg oral tablet: 3 tab(s) orally 2 times a day (12 Jul 2023 20:13)  cholecalciferol 50 mcg (2000 intl units) oral tablet: 1 tab(s) orally once a day (12 Jul 2023 20:13)  ciclopirox 8% topical solution: Apply topically to affected area once a day (at bedtime) (12 Jul 2023 20:13)  donepezil 10 mg oral tablet: 1 tab(s) orally once a day (at bedtime) (12 Jul 2023 20:13)  doxazosin: 6mg twice a day (12 Jul 2023 20:13)  formoterol 20 mcg/2 mL inhalation solution: 2 milliliter(s) inhaled once a day (12 Jul 2023 20:13)  furosemide 40 mg oral tablet: 1 tab(s) orally once a day (12 Jul 2023 20:13)  HumuLIN 70/30 subcutaneous suspension: 24 units in the morning and 14 units in the evening (12 Jul 2023 20:13)  ipratropium 42 mcg/inh (0.06%) nasal spray: 1 spray(s) intranasally 2 times a day (12 Jul 2023 20:13)  Januvia 25 mg oral tablet: 1 tab(s) orally once a day (12 Jul 2023 20:13)  letrozole 2.5 mg oral tablet: 1 tab(s) orally once a day (12 Jul 2023 20:13)  Multiple Vitamins oral tablet: 1 tab(s) orally once a day (12 Jul 2023 20:13)  PreserVision AREDS 2 oral capsule: 1 cap(s) orally once a day (12 Jul 2023 20:09)  sodium chloride nebulizer solution: inhale once daily; dose unknown (12 Jul 2023 20:10)            MEDICATIONS  (STANDING):  aspirin enteric coated 81 milliGRAM(s) Oral daily  atorvastatin 10 milliGRAM(s) Oral at bedtime  buDESOnide    Inhalation Suspension 0.5 milliGRAM(s) Inhalation every 12 hours  carvedilol 37.5 milliGRAM(s) Oral every 12 hours  cholecalciferol 2000 Unit(s) Oral daily  ciprofloxacin   IVPB 200 milliGRAM(s) IV Intermittent every 12 hours  cloNIDine 0.1 milliGRAM(s) Oral two times a day  dextrose 5%. 1000 milliLiter(s) (100 mL/Hr) IV Continuous <Continuous>  dextrose 5%. 1000 milliLiter(s) (50 mL/Hr) IV Continuous <Continuous>  dextrose 50% Injectable 25 Gram(s) IV Push once  dextrose 50% Injectable 25 Gram(s) IV Push once  dextrose 50% Injectable 12.5 Gram(s) IV Push once  donepezil 10 milliGRAM(s) Oral at bedtime  doxazosin 8 milliGRAM(s) Oral at bedtime  furosemide   Injectable 40 milliGRAM(s) IV Push every 12 hours  glucagon  Injectable 1 milliGRAM(s) IntraMuscular once  insulin glargine Injectable (LANTUS) 15 Unit(s) SubCutaneous at bedtime  insulin lispro (ADMELOG) corrective regimen sliding scale   SubCutaneous three times a day before meals  insulin lispro Injectable (ADMELOG) 6 Unit(s) SubCutaneous three times a day before meals  letrozole 2.5 milliGRAM(s) Oral daily  metroNIDAZOLE  IVPB 500 milliGRAM(s) IV Intermittent every 8 hours  Nephro-stacia 1 Tablet(s) Oral daily  sodium zirconium cyclosilicate 5 Gram(s) Oral once    MEDICATIONS  (PRN):  dextrose Oral Gel 15 Gram(s) Oral once PRN Blood Glucose LESS THAN 70 milliGRAM(s)/deciliter      Allergies    hydrALAZINE (Rash)  vitamin E (Short breath; Urticaria; Hives)  NIFEdipine (Urticaria; Hives)  doxycycline (Rash)  nafcillin (Unknown)  isoniazid (Rash)    Intolerances        Review of Systems:  Neuro: No HA, no dizziness  Cardiovascular: No chest pain, no palpitations  Respiratory: no SOB, no cough  GI: No nausea, vomiting, abdominal pain  MSK: Denies joint/muscle pain      ALL OTHER SYSTEMS REVIEWED AND NEGATIVE        PHYSICAL EXAM:  VITALS: T(C): 36.6 (07-13-23 @ 05:03)  T(F): 97.9 (07-13-23 @ 05:03), Max: 98.5 (07-12-23 @ 19:26)  HR: 68 (07-13-23 @ 05:03) (63 - 75)  BP: 180/70 (07-13-23 @ 05:03) (163/60 - 196/68)  RR:  (18 - 22)  SpO2:  (94% - 100%)  Wt(kg): --  GENERAL: NAD, well-groomed, well-developed  NEURO:  alert and oriented  RESPIRATORY: Clear to auscultation bilaterally; No rales, rhonchi, wheezing  CARDIOVASCULAR: Si S2  GI: Soft, non distended, normal bowel sounds  MUSCULOSKELETAL: Moves all extremities equally       POCT Blood Glucose.: 179 mg/dL (07-13-23 @ 07:33)  POCT Blood Glucose.: 151 mg/dL (07-12-23 @ 19:37)                            9.6    7.88  )-----------( 229      ( 12 Jul 2023 16:17 )             31.9       07-12    138  |  104  |  35<H>  ----------------------------<  120<H>  5.1   |  23  |  2.07<H>    eGFR: 25<L>    Ca    8.9      07-12  Mg     2.7     07-12    TPro  7.8  /  Alb  2.7<L>  /  TBili  0.4  /  DBili  x   /  AST  28  /  ALT  20  /  AlkPhos  119  07-12      Thyroid Function Tests:    Diet, Minced and Moist:   Mildly Thick Liquids (MILDTHICKLIQS)  No Concentrated Potassium (07-13-23 @ 07:52) [Active]

## 2023-07-13 NOTE — SWALLOW BEDSIDE ASSESSMENT ADULT - ADDITIONAL RECOMMENDATIONS
Swallow: 1. Pt/family/caregiver will demonstrate understanding and carryover of dysphagia management (safe swallow guidelines, compensatory strategies, dysphagia diet).  2. Pt will complete dysphagia exercises to improve swallow function.  3. Pt will tolerate recommended diet with no overt, clinical s/s of aspiration.  4. Pt will participate in MBS exam

## 2023-07-13 NOTE — SWALLOW BEDSIDE ASSESSMENT ADULT - SWALLOW EVAL: DIAGNOSIS
PT is a 73yoF with pmhx of DM, HTN, breast CA, respiratory arrest and cardiac arrest (2018), CVA w/ residual R weakness, h/o trach and PEG s/p removal of both (2018), p/w c/f aspiration PNA. Pt p/w an oropharyngeal dysphagia with a baseline cough, which confounds results of exam to a degree. Oral Phase: reduced oral grading of tsp and cup, mildly prolonged oral transport. Pharyngeal Phase: delayed initiation of swallow. Intermittent baseline cough present throughout exam, however, without change in frequency, vocal quality, or effort with puree and moderately thick liquids via teaspoon. Cough noted with moderately thick liquids via cup with slight increase in wet vocal quality. Diet modification indicated. Aspiration unable to be r/o at bedside given presence of baseline cough, and therefore objective swallow test indicated.

## 2023-07-13 NOTE — PROGRESS NOTE ADULT - SUBJECTIVE AND OBJECTIVE BOX
Patient is a 73y old  Female who presents with a chief complaint of respiratory distress (13 Jul 2023 09:47)      SUBJECTIVE / OVERNIGHT EVENTS: ptn is awake, alert, daughter at bedside. daughter mentioned she had previously seen a pcp at [facility], wants me to cont seeing her on outptn at home. i explained to her she needs to pick a pcp, she cannot have me and [facility] in the hospital. she wants to stay with me, if her father changes his mind, she will let me know. Ptn looks better today, more communicative, smiling, eating rice pudding, coughing after each bite. awaitng S&S, will need MBS. ptn is hungry. she used to have a PEG, was removed few years ago. HTN is not controlled coreg raised to 37.5 bid. ptn seen by Renal, Scr at baseline, started on Farxiga and will start Losartan 25 mg    MEDICATIONS  (STANDING):  aspirin enteric coated 81 milliGRAM(s) Oral daily  atorvastatin 10 milliGRAM(s) Oral at bedtime  buDESOnide    Inhalation Suspension 0.5 milliGRAM(s) Inhalation every 12 hours  carvedilol 37.5 milliGRAM(s) Oral every 12 hours  cholecalciferol 2000 Unit(s) Oral daily  ciprofloxacin   IVPB 200 milliGRAM(s) IV Intermittent every 12 hours  cloNIDine 0.1 milliGRAM(s) Oral two times a day  dextrose 5%. 1000 milliLiter(s) (100 mL/Hr) IV Continuous <Continuous>  dextrose 5%. 1000 milliLiter(s) (50 mL/Hr) IV Continuous <Continuous>  dextrose 50% Injectable 25 Gram(s) IV Push once  dextrose 50% Injectable 12.5 Gram(s) IV Push once  dextrose 50% Injectable 25 Gram(s) IV Push once  donepezil 10 milliGRAM(s) Oral at bedtime  doxazosin 8 milliGRAM(s) Oral at bedtime  furosemide   Injectable 40 milliGRAM(s) IV Push every 12 hours  glucagon  Injectable 1 milliGRAM(s) IntraMuscular once  insulin lispro (ADMELOG) corrective regimen sliding scale   SubCutaneous three times a day before meals  letrozole 2.5 milliGRAM(s) Oral daily  metroNIDAZOLE  IVPB 500 milliGRAM(s) IV Intermittent every 8 hours  Nephro-stacia 1 Tablet(s) Oral daily  sodium zirconium cyclosilicate 5 Gram(s) Oral once    MEDICATIONS  (PRN):  dextrose Oral Gel 15 Gram(s) Oral once PRN Blood Glucose LESS THAN 70 milliGRAM(s)/deciliter      Vital Signs Last 24 Hrs  T(F): 97.9 (07-13-23 @ 05:03), Max: 98.5 (07-12-23 @ 19:26)  HR: 68 (07-13-23 @ 05:03) (63 - 75)  BP: 180/70 (07-13-23 @ 05:03) (163/60 - 196/68)  RR: 20 (07-13-23 @ 05:03) (18 - 22)  SpO2: 98% (07-13-23 @ 05:03) (94% - 100%)  Telemetry:   CAPILLARY BLOOD GLUCOSE      POCT Blood Glucose.: 179 mg/dL (13 Jul 2023 07:33)  POCT Blood Glucose.: 151 mg/dL (12 Jul 2023 19:37)    I&O's Summary      PHYSICAL EXAM:  GENERAL: NAD, well-developed  HEAD:  Atraumatic, Normocephalic  EYES: EOMI, PERRLA, conjunctiva and sclera clear  NECK: Supple, No JVD  CHEST/LUNG: Clear to auscultation bilaterally; No wheeze  HEART: Regular rate and rhythm; No murmurs, rubs, or gallops  ABDOMEN: Soft, Nontender, Nondistended; Bowel sounds present  EXTREMITIES:  2+ Peripheral Pulses, No clubbing, cyanosis, or edema  PSYCH: AAOx3  NEUROLOGY: non-focal  SKIN: No rashes or lesions    LABS:                        9.6    7.88  )-----------( 229      ( 12 Jul 2023 16:17 )             31.9     07-12    138  |  104  |  35<H>  ----------------------------<  120<H>  5.1   |  23  |  2.07<H>    Ca    8.9      12 Jul 2023 16:17  Mg     2.7     07-12    TPro  7.8  /  Alb  2.7<L>  /  TBili  0.4  /  DBili  x   /  AST  28  /  ALT  20  /  AlkPhos  119  07-12          Urinalysis Basic - ( 12 Jul 2023 16:17 )    Color: x / Appearance: x / SG: x / pH: x  Gluc: 120 mg/dL / Ketone: x  / Bili: x / Urobili: x   Blood: x / Protein: x / Nitrite: x   Leuk Esterase: x / RBC: x / WBC x   Sq Epi: x / Non Sq Epi: x / Bacteria: x        RADIOLOGY & ADDITIONAL TESTS:    Imaging Personally Reviewed:    Consultant(s) Notes Reviewed:      Care Discussed with Consultants/Other Providers:

## 2023-07-13 NOTE — CONSULT NOTE ADULT - SUBJECTIVE AND OBJECTIVE BOX
CHIEF COMPLAINT:    HISTORY OF PRESENT ILLNESS:  74 y/o female with multiple medical problems whom i see in my practice on outptn basis. last home visit marichuy 15   Ptn has h/o DM, HTN, breast CA, respiratory arrest and cardiac arrest (2018), CVA with residual weakness, h/o trache, PEG, both removed, now eating on her own but aspirates and has post prandial cough in addition to chronic productive cough  seen in my office two weeks with worsening leg swelling   started lasix      PAST MEDICAL & SURGICAL HISTORY:  Breast CA      Diabetes      Stroke      Cardiac arrest      HTN (hypertension)      H/O mastectomy, bilateral              MEDICATIONS:  aspirin enteric coated 81 milliGRAM(s) Oral daily  carvedilol 37.5 milliGRAM(s) Oral every 12 hours  doxazosin 8 milliGRAM(s) Oral at bedtime  furosemide   Injectable 40 milliGRAM(s) IV Push every 12 hours    ciprofloxacin   IVPB 200 milliGRAM(s) IV Intermittent every 12 hours  metroNIDAZOLE  IVPB 500 milliGRAM(s) IV Intermittent every 8 hours    buDESOnide    Inhalation Suspension 0.5 milliGRAM(s) Inhalation every 12 hours    donepezil 10 milliGRAM(s) Oral at bedtime      atorvastatin 10 milliGRAM(s) Oral at bedtime  dextrose 50% Injectable 25 Gram(s) IV Push once  dextrose 50% Injectable 12.5 Gram(s) IV Push once  dextrose 50% Injectable 25 Gram(s) IV Push once  dextrose Oral Gel 15 Gram(s) Oral once PRN  glucagon  Injectable 1 milliGRAM(s) IntraMuscular once  insulin lispro (ADMELOG) corrective regimen sliding scale   SubCutaneous three times a day before meals    cholecalciferol 2000 Unit(s) Oral daily  dextrose 5%. 1000 milliLiter(s) IV Continuous <Continuous>  dextrose 5%. 1000 milliLiter(s) IV Continuous <Continuous>  letrozole 2.5 milliGRAM(s) Oral daily  Nephro-stacia 1 Tablet(s) Oral daily      FAMILY HISTORY:  No pertinent family history in first degree relatives        SOCIAL HISTORY:    [ ] Non-smoker  [ ] Smoker  [ ] Alcohol    Allergies    hydrALAZINE (Rash)  vitamin E (Short breath; Urticaria; Hives)  NIFEdipine (Urticaria; Hives)  doxycycline (Rash)  nafcillin (Unknown)  isoniazid (Rash)    Intolerances    	    REVIEW OF SYSTEMS:  CONSTITUTIONAL: No fever, weight loss, + fatigue  EYES: No eye pain, visual disturbances, or discharge  ENMT:  No difficulty hearing, tinnitus, vertigo; No sinus or throat pain  NECK: No pain or stiffness  RESPIRATORY: No cough, wheezing, chills or hemoptysis; + Shortness of Breath  CARDIOVASCULAR: No chest pain, palpitations, passing out, dizziness, or leg swelling  GASTROINTESTINAL: No abdominal or epigastric pain. No nausea, vomiting, or hematemesis; No diarrhea or constipation. No melena or hematochezia.  GENITOURINARY: No dysuria, frequency, hematuria, or incontinence  NEUROLOGICAL: No headaches, memory loss, loss of strength, numbness, or tremors  SKIN: No itching, burning, rashes, or lesions   LYMPH Nodes: No enlarged glands  ENDOCRINE: No heat or cold intolerance; No hair loss  MUSCULOSKELETAL: No joint pain= swelling; No muscle, back, or extremity pain  PSYCHIATRIC: No depression, anxiety, mood swings, or difficulty sleeping  HEME/LYMPH: No easy bruising, or bleeding gums  ALLERY AND IMMUNOLOGIC: No hives or eczema	    [ ] All others negative	  [ ] Unable to obtain    PHYSICAL EXAM:  T(C): 36.6 (07-13-23 @ 05:03), Max: 36.9 (07-12-23 @ 11:29)  HR: 68 (07-13-23 @ 05:03) (63 - 75)  BP: 180/70 (07-13-23 @ 05:03) (163/60 - 196/68)  RR: 20 (07-13-23 @ 05:03) (18 - 22)  SpO2: 98% (07-13-23 @ 05:03) (94% - 100%)  Wt(kg): --  I&O's Summary      Appearance: NAD 3 liters NC   HEENT:   Normal oral mucosa, PERRL, EOMI	  Lymphatic: No lymphadenopathy  Cardiovascular: Normal S1 S2, No JVD, No murmurs, No edema  Respiratory: decreased bs   Psychiatry: A & O x 3, Mood & affect appropriate  Gastrointestinal:  Soft, Non-tender, + BS	  Skin: No rashes, No ecchymoses, No cyanosis	  Neurologic: Non-focal  Extremities: Normal range of motion, No clubbing, cyanosis or edema  Vascular: Peripheral pulses palpable 2+ bilaterally    TELEMETRY: 	    ECG:  	  RADIOLOGY:  < from: CT Chest No Cont (07.12.23 @ 17:11) >  ACC: [id number] EXAM:  CT CHEST   ORDERED BY: KILEY BACH     PROCEDURE DATE:  07/12/2023          INTERPRETATION:  CLINICAL INFORMATION: Cough. History of breast cancer.    COMPARISON: CT chest 6/10/2019. CT chest 1/31/2018. Abdominal CT   12/29/2021    CONTRAST/COMPLICATIONS:  IV Contrast: NONE  Oral Contrast: NONE  Complications: None reported at time of study completion    PROCEDURE:  CT of the Chest was performed.  Sagittal and coronal reformats were performed.    FINDINGS:  Motion limited study.    LUNGS, AIRWAYS AND PLEURA: The central airways are patent with tracheal   and segmental/subsegmental bronchial mucous secretions. There is   multifocal consolidation in bilateral upper lobes, right middle lobe and   lingula with branchingtubular opacities. While finding was present in   2019, previously seen areas of consolidation have resolved and there are   new areas of involvement on the current study. A thick-walled cyst at the   right apex has evolved since 2019, having overalldecreased in size with   increased wall thickening. Small right pleural effusion. No pneumothorax.    MEDIASTINUM AND JUSTINO: Redemonstrated nonspecific prominent multifocal   mediastinal lymph nodes.    HEART/vessels: Heart size is normal. No pericardial effusion. Coronary   arterial and mitral annular calcification. The great vessels are normal   in size.    VISUALIZED UPPER ABDOMEN: This artery calcifications. The kidneys are   partially atrophic.    BONES/SOFT TISSUES: Bilateral mastectomies. Cardiac loop recorder.   Chronic L1 compression deformity, unchanged since December 2021 abdominal   CT. Old sternal and bilateral rib fractures may be related to prior   cardiopulmonary resuscitation. Otherwise no aggressive osseous lesion.    IMPRESSION:  Upper lung predominant bilateral multifocal consolidation with branching   tubular opacities, suggestive of endobronchial spread of infection.   Interval right apical thick-walled cyst since 2019. TB should be   considered in the appropriate clinical setting, among other infectious   etiologies.    Small right pleural effusion.        < end of copied text >  < from: Xray Chest 1 View- PORTABLE-Urgent (07.12.23 @ 15:22) >    ACC: [id number] EXAM:  XR CHEST PORTABLE URGENT 1V   ORDERED BY: KILEY BACH     PROCEDURE DATE:  07/12/2023          INTERPRETATION:  INDICATION: Chest pain.    COMPARISON: Multiple prior chest x-rays with most recent dated 12/31/2021.    FINDINGS:  Heart/Vascular: Heart size is unchanged. Left chest wall loop recorder.  Pulmonary: Redemonstrated right upper lung opacities which appear mildly   decreased as compared with 12/31/2021. Small bilateral pleural effusions.   No pneumothorax.  Bones: No acute bony pathology.    Impression:  Redemonstrated right upper lung opacities which appear mildly decreased   as compared with 12/31/2021.    Small bilateral pleural effusions.    --- End of Report ---          < end of copied text >  Pro-Brain Natriuretic Peptide: 8075 pg/mL (07.12.23 @ 16:17)   OTHER: 	  	  LABS:	 	    CARDIAC MARKERS:                                  9.6    7.88  )-----------( 229      ( 12 Jul 2023 16:17 )             31.9     07-12    138  |  104  |  35<H>  ----------------------------<  120<H>  5.1   |  23  |  2.07<H>    Ca    8.9      12 Jul 2023 16:17  Mg     2.7     07-12    TPro  7.8  /  Alb  2.7<L>  /  TBili  0.4  /  DBili  x   /  AST  28  /  ALT  20  /  AlkPhos  119  07-12    proBNP:   Lipid Profile:   HgA1c:   TSH:

## 2023-07-13 NOTE — PROGRESS NOTE ADULT - ASSESSMENT
74 y/o female with multiple medical problems whom i see in my practice on outptn basis. last home visit marichuy 15   Ptn has h/o DM, HTN, breast CA, respiratory arrest and cardiac arrest (2018), CVA with residual weakness, h/o trache, PEG, both removed, now eating on her own but aspirates and has post prandial cough in addition to chronic productive cough. presumed 2/2 silent aspiration of her saliva this was d/w Dr. Jose Almendarez who is in agreement    Today ptn presents to the ED with  complaining of worsening cough and orthopnea. ptn has chronic LE edema.   Denies any headache, fever, chills, chest pain, abdominal pain, n/v/d, dysuria. Unable to obtain any history or ROS due to mental status.    A/P  7/11: Aspiration PNA, cannot R/O BRYAN. check sputum culture, acute on chronic diastolic chf, , chronic aspiration  ptn has multiple ABx allergies, start Cipro and flagyl which she tolerated in the past  cont outptn meds, get S&S eval  insulin on a sliding scale, RUSS, check bladder sono, renal sono  card, renal, pulm, endo ID called  cont outptn meds  7/12: ptn is awake, alert, daughter at bedside. daughter mentioned [facility], wants me to cont seeing her on outptn at home and her but also wants to cont w [facility] on outptn. i explained to her she needs to pick a pcp, she cannot have me and [facility] in the hospital. she wants to stay with me, if her father changes his mind, she will let me know.   Ptn looks better today, more communicative, smiling, eating rice pudding, coughing after each bite. awaitng S&S, will need MBS. didnt tolerate FEEST in the past. ptn is hungry. she used to have a trache and a PEG post CVA, was removed few years ago. HTN is not controlled coreg raised to 37.5 bid. ptn seen by Renal, Scr at baseline, started on Farxiga and will start Losartan 25 mg            dvt ppx w [facility]

## 2023-07-13 NOTE — CONSULT NOTE ADULT - PROBLEM SELECTOR RECOMMENDATION 4
Uncontrolled   DC losartan given RUSS   start hydralazine 10 TID   cont with coreg 37.5 bid Uncontrolled   DC losartan given RUSS   start clonidine 0.1 bid   cont with coreg 37.5 bid

## 2023-07-13 NOTE — CONSULT NOTE ADULT - SUBJECTIVE AND OBJECTIVE BOX
07-13-23 @ 13:00    Patient is a 73y old  Female who presents with a chief complaint of respiratory distress (13 Jul 2023 11:08)      HPI:  72 y/o female with multiple medical problems whom i see in my practice on outptn basis. last home visit marichuy 15 Ptn has h/o DM, HTN, breast CA, respiratory arrest and cardiac arrest (2018), CVA with residual weakness, h/o trache, PEG, both removed, now eating on her own but aspirates and has post prandial cough in addition to chronic productive cough. presumed 2/2 silent aspiration of her saliva this was d/w Dr. Jose Almendarez who is in agreement. Today ptn presents to the ED with  complaining of worsening cough and orthopnea. ptn has chronic LE edema.  Denies any headache, fever, chills, chest pain, abdominal pain, n/v/d, dysuria. Unable to obtain any history or ROS due to mental status.   (12 Jul 2023 19:28):    above confirmed with pts :   pt also uses vest therapy at home:   she does not walk :  she does cough when she eats:  and currently has lot of cough with excessive phlegm :   she never had tuberculosis but had treatment for latent tb     ?FOLLOWING PRESENT  [x ] Hx of PE/DVT, [ x] Hx COPD, [x ] Hx of Asthma, [ y] Hx of Hospitalization, [ x]  Hx of BiPAP/CPAP use, [ x] Hx of TYRONE    Allergies    hydrALAZINE (Rash)  vitamin E (Short breath; Urticaria; Hives)  NIFEdipine (Urticaria; Hives)  doxycycline (Rash)  nafcillin (Unknown)  isoniazid (Rash)    Intolerances        PAST MEDICAL & SURGICAL HISTORY:  Breast CA      Diabetes      Stroke      Cardiac arrest      HTN (hypertension)      H/O mastectomy, bilateral          FAMILY HISTORY:  No pertinent family history in first degree relatives        Social History: [  ] TOBACCO                  [ x ] ETOH                                 [  ] IxVDA/DRUGS  x  REVIEW OF SYSTEMS      General:	x    Skin/Breast:x  	  Ophthalmologic:x  	  ENMT:	x    Respiratory and Thorax:  cough , excessive phlegm   	  Cardiovascular:	x    Gastrointestinal:	x    Genitourinary:	x    Musculoskeletal:	x  x    Psychiatric:	x    Hematology/Lymphatics:	x    Endocrine:	x    Allergic/Immunologic:	x    MEDICATIONS  (STANDING):  aspirin enteric coated 81 milliGRAM(s) Oral daily  atorvastatin 10 milliGRAM(s) Oral at bedtime  buDESOnide    Inhalation Suspension 0.5 milliGRAM(s) Inhalation every 12 hours  carvedilol 37.5 milliGRAM(s) Oral every 12 hours  cholecalciferol 2000 Unit(s) Oral daily  ciprofloxacin   IVPB 200 milliGRAM(s) IV Intermittent every 12 hours  cloNIDine 0.1 milliGRAM(s) Oral two times a day  dextrose 5%. 1000 milliLiter(s) (100 mL/Hr) IV Continuous <Continuous>  dextrose 5%. 1000 milliLiter(s) (50 mL/Hr) IV Continuous <Continuous>  dextrose 50% Injectable 25 Gram(s) IV Push once  dextrose 50% Injectable 12.5 Gram(s) IV Push once  dextrose 50% Injectable 25 Gram(s) IV Push once  donepezil 10 milliGRAM(s) Oral at bedtime  doxazosin 8 milliGRAM(s) Oral at bedtime  furosemide   Injectable 40 milliGRAM(s) IV Push every 12 hours  glucagon  Injectable 1 milliGRAM(s) IntraMuscular once  insulin glargine Injectable (LANTUS) 15 Unit(s) SubCutaneous at bedtime  insulin lispro (ADMELOG) corrective regimen sliding scale   SubCutaneous three times a day before meals  insulin lispro Injectable (ADMELOG) 6 Unit(s) SubCutaneous three times a day before meals  letrozole 2.5 milliGRAM(s) Oral daily  metroNIDAZOLE  IVPB 500 milliGRAM(s) IV Intermittent every 8 hours  Nephro-stacia 1 Tablet(s) Oral daily    MEDICATIONS  (PRN):  dextrose Oral Gel 15 Gram(s) Oral once PRN Blood Glucose LESS THAN 70 milliGRAM(s)/deciliter       Vital Signs Last 24 Hrs  T(C): 36.6 (13 Jul 2023 05:03), Max: 36.9 (12 Jul 2023 19:26)  T(F): 97.9 (13 Jul 2023 05:03), Max: 98.5 (12 Jul 2023 19:26)  HR: 68 (13 Jul 2023 05:03) (63 - 75)  BP: 180/70 (13 Jul 2023 05:03) (174/72 - 196/68)  BP(mean): --  RR: 20 (13 Jul 2023 05:03) (18 - 22)  SpO2: 98% (13 Jul 2023 05:03) (94% - 100%)    Parameters below as of 13 Jul 2023 05:03  Patient On (Oxygen Delivery Method): nasal cannula  O2 Flow (L/min): 3  Orthostatic VS          I&O's Summary      Physical Exam:   GENERAL: NAD, well-groomed, well-developed  HEENT: MANDY/   Atraumatic, Normocephalic  ENMT: No tonsillar erythema, exudates, or enlargement; Moist mucous membranes, Good dentition, No lesions  NECK: Supple, No JVD, Normal thyroid  CHEST/LUNG: Clear to auscultation bilaterally- no wheezing:   CVS: Regular rate and rhythm; No murmurs, rubs, or gallops  GI: : Soft, Nontender, Nondistended; Bowel sounds present  NERVOUS SYSTEM:  Alert & Oriented X3  EXTREMITIES:  - edema  LYMPH: No lymphadenopathy noted  SKIN: No rashes or lesions  ENDOCRINOLOGY: No Thyromegaly  PSYCH: calm     Labs:  Venous<57<4>>44<<7.335>>Venous<<3><<4><<5<<449>>SARS-CoV-2: NotDetec (12 Jul 2023 16:17)                              9.6    7.88  )-----------( 229      ( 12 Jul 2023 16:17 )             31.9     07-12    138  |  104  |  35<H>  ----------------------------<  120<H>  5.1   |  23  |  2.07<H>    Ca    8.9      12 Jul 2023 16:17  Mg     2.7     07-12    TPro  7.8  /  Alb  2.7<L>  /  TBili  0.4  /  DBili  x   /  AST  28  /  ALT  20  /  AlkPhos  119  07-12    CAPILLARY BLOOD GLUCOSE      POCT Blood Glucose.: 327 mg/dL (13 Jul 2023 11:37)  POCT Blood Glucose.: 179 mg/dL (13 Jul 2023 07:33)  POCT Blood Glucose.: 151 mg/dL (12 Jul 2023 19:37)    LIVER FUNCTIONS - ( 12 Jul 2023 16:17 )  Alb: 2.7 g/dL / Pro: 7.8 g/dL / ALK PHOS: 119 U/L / ALT: 20 U/L / AST: 28 U/L / GGT: x             Urinalysis Basic - ( 12 Jul 2023 16:17 )    Color: x / Appearance: x / SG: x / pH: x  Gluc: 120 mg/dL / Ketone: x  / Bili: x / Urobili: x   Blood: x / Protein: x / Nitrite: x   Leuk Esterase: x / RBC: x / WBC x   Sq Epi: x / Non Sq Epi: x / Bacteria: x      D DImer      Studies  Chest X-RAY  CT SCAN Chest   CT Abdomen  Venous Dopplers: LE:   Others        rad< from: CT Chest No Cont (07.12.23 @ 17:11) >    MEDIASTINUM AND JUSTINO: Redemonstrated nonspecific prominent multifocal   mediastinal lymph nodes.    HEART/vessels: Heart size is normal. No pericardial effusion. Coronary   arterial and mitral annular calcification. The great vessels are normal   in size.    VISUALIZED UPPER ABDOMEN: This artery calcifications. The kidneys are   partially atrophic.    BONES/SOFT TISSUES: Bilateral mastectomies. Cardiac loop recorder.   Chronic L1 compression deformity, unchanged since December 2021 abdominal   CT. Old sternal and bilateral rib fractures may be related to prior   cardiopulmonary resuscitation. Otherwise no aggressive osseous lesion.    IMPRESSION:  Upper lung predominant bilateral multifocal consolidation with branching   tubular opacities, suggestive of endobronchial spread of infection.   Interval right apical thick-walled cyst since 2019. TB should be   considered in the appropriate clinical setting, among other infectious   etiologies.    Small right pleural effusion.        --- End of Report ---    < end of copied text >  < from: Xray Chest 1 View- PORTABLE-Urgent (07.12.23 @ 15:22) >      < end of copied text >      < from: CT Chest No Cont (06.10.19 @ 09:34) >    MEDIASTINUM AND JUSTINO: Mediastinal lymphadenopathy with a reference right   paratracheal node measuring 1.5 x 1.3 cm.    VESSELS: Atherosclerosis.    HEART: Heart size is normal. No pericardial effusion. Coronary and aortic   root calcifications.    CHEST WALL AND LOWER NECK: Right axillary surgical clips.     VISUALIZED UPPER ABDOMEN: Within normal limits.    BONES: Chronic fracture deformity of the sternum. Multiple chronic rib   fractures. Degenerative changes.     IMPRESSION:   Bilateral upper lobe consolidations, groundglass and tree-in-bud   opacities likely representing pneumonia however reactivation tuberculosis   should be considered.    Mediastinal lymphadenopathy.    < end of copied text >    < from: TTE W or WO Ultrasound Enhancing Agent (07.13.23 @ 08:06) >  CONCLUSIONS:      1. The left ventricular systolic function is normal with an ejection fraction of 59 % by Hammer's method of disks.   2. Normal right ventricular systolic function. The tricuspid annular plane systolic excursion (TAPSE) is 2.1 cm (normal >=1.7 cm).   3. No pericardial effusion seen.   4. Compared to the transthoracic echocardiogram performed on 2/26/2018 there is LVH.   5. Mild aortic stenosis.   6. There is increased LV mass and concentric hypertrophy.    < end of copied text >

## 2023-07-13 NOTE — SWALLOW BEDSIDE ASSESSMENT ADULT - PHARYNGEAL PHASE
suspected reduced hyolaryngeal elevation upon digital palpation; intermittent baseline cough without change in vocal quality, effort, or frequency  with teaspoon, however, with cup sip, noted cough with slight wet vocal quality/Delayed pharyngeal swallow suspected reduced hyolaryngeal elevation upon digital palpation; intermittent baseline cough without change in vocal quality, effort, or frequency/Delayed pharyngeal swallow

## 2023-07-13 NOTE — CONSULT NOTE ADULT - SUBJECTIVE AND OBJECTIVE BOX
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 Patient's daughter states that she had ongoing cough and dyspnea for two weeks. She did not have any fevers and did not complain of chills, or night sweats. She has been coughing up frothy sputum ranging from clear to green. She was evaluated by Speech therapy in a previous admission and was found to be aspirating. She however was eating a regular solid diet at home.  At the ED, the patient was afebrile and saturating at 94% on RA. WBC was 7.88, H&H 9.6/31.9, BNP 8075, HS Troponin T slightly elevated at 56, and Cr. elevated to 2.07. Cardiology and Nephrology were consulted by the primary team. The patient was empirically started on Ciprofloxacin and Metronidazole as she has a hx of allergies to Nafcillin w/ unknown reaction.   This morning, the patient states that she is feeling better than she did at admission but she is still having significant productive cough. She was started on 3L O2 overnight but she doesn't have dyspnea on RA and feels like she doesn't need it. She denies chest pain, headaches, dizziness, neck stiffness, abdominal pain, dysuria, diarrhea or unhealing wounds.   Notably, the patient was diagnosed with latent TB back in 2017 as she was undergoing evaluation for mastectomy. She was able to get through 1/2 of INH treatment when she developed a rash. Her doctor switched her to a different medication for the remainder of therapy but she does not recall the medication name.        REVIEW OF SYSTEMS  Constitutional: No fevers, chills, weight loss or fatigue   Skin: No rash, no phlebitis	  Eyes: No discharge	  ENMT: No sore throat, oral thrush, ulcers or exudate  Respiratory: Productive cough w/ white-green sputum, Orthopnea   Cardiovascular:  No chest pain, palpitations or edema   Gastrointestinal: No pain, nausea, vomiting, diarrhea or constipation	  Genitourinary: No dysuria, discharge or flank pain  MSK: No arthralgias or back pain   Neurological: No HA, no weakness, no seizures, no AMS       prior hospital charts reviewed [V]  primary team notes reviewed [V]  other consultant notes reviewed [V]    PAST MEDICAL & SURGICAL HISTORY:  Breast CA      Diabetes      Stroke      Cardiac arrest      HTN (hypertension)      H/O mastectomy, bilateral          SOCIAL HISTORY:  - Denied smoking/vaping/alcohol/recreational drug use    FAMILY HISTORY:  No pertinent family history in first degree relatives        Allergies  hydrALAZINE (Rash)  vitamin E (Short breath; Urticaria; Hives)  NIFEdipine (Urticaria; Hives)  doxycycline (Rash)  nafcillin (Unknown)  isoniazid (Rash)        ANTIMICROBIALS:  ciprofloxacin   IVPB 200 every 12 hours  metroNIDAZOLE  IVPB 500 every 8 hours      ANTIMICROBIALS (past 90 days):  MEDICATIONS  (STANDING):    metroNIDAZOLE  IVPB   100 mL/Hr IV Intermittent (07-13-23 @ 09:17)        OTHER MEDS:   MEDICATIONS  (STANDING):  aspirin enteric coated 81 daily  atorvastatin 10 at bedtime  buDESOnide    Inhalation Suspension 0.5 every 12 hours  carvedilol 37.5 every 12 hours  cloNIDine 0.1 two times a day  dextrose 50% Injectable 25 once  dextrose 50% Injectable 12.5 once  dextrose 50% Injectable 25 once  dextrose Oral Gel 15 once PRN  donepezil 10 at bedtime  doxazosin 8 at bedtime  furosemide   Injectable 40 every 12 hours  glucagon  Injectable 1 once  insulin glargine Injectable (LANTUS) 15 at bedtime  insulin lispro (ADMELOG) corrective regimen sliding scale  three times a day before meals  insulin lispro Injectable (ADMELOG) 6 three times a day before meals  letrozole 2.5 daily      VITALS:  Vital Signs Last 24 Hrs  T(F): 97.9 (07-13-23 @ 05:03), Max: 98.5 (07-12-23 @ 19:26)    Vital Signs Last 24 Hrs  HR: 68 (07-13-23 @ 05:03) (63 - 75)  BP: 180/70 (07-13-23 @ 05:03) (163/60 - 196/68)  RR: 20 (07-13-23 @ 05:03)  SpO2: 98% (07-13-23 @ 05:03) (94% - 100%)  Wt(kg): --    EXAM:  General: Patient in no acute distress   HEENT: NCAT, EOMI, PERRL, no oral lesions  CV: S1+S2, no m/r/g appreciated   Lungs: No respiratory distress, CTAB  Abd: Soft, nontender, no guarding, no rebound tenderness, + bowel sounds   Ext: No cyanosis, no edema  Neuro: Alert and oriented, no focal deficits, CN II-XII grossly intact   Skin: No rash   IV: No phlebitis      Labs:                        9.6    7.88  )-----------( 229      ( 12 Jul 2023 16:17 )             31.9     07-12    138  |  104  |  35<H>  ----------------------------<  120<H>  5.1   |  23  |  2.07<H>    Ca    8.9      12 Jul 2023 16:17  Mg     2.7     07-12    TPro  7.8  /  Alb  2.7<L>  /  TBili  0.4  /  DBili  x   /  AST  28  /  ALT  20  /  AlkPhos  119  07-12      WBC Trend:  WBC Count: 7.88 (07-12-23 @ 16:17)  Auto Neutrophil #: 5.39 K/uL (07-12-23 @ 16:17)      Creatine Trend:  Creatinine: 2.07 (07-12)      Liver Biochemical Testing Trend:  Alanine Aminotransferase (ALT/SGPT): 20 (07-12)  Aspartate Aminotransferase (AST/SGOT): 28 (07-12-23 @ 16:17)  Bilirubin Total: 0.4 (07-12)      Trend LDH      Auto Eosinophil %: 3.2 % (07-12-23 @ 16:17)    Urinalysis Basic - ( 12 Jul 2023 16:17 )  Color: x / Appearance: x / SG: x / pH: x  Gluc: 120 mg/dL / Ketone: x  / Bili: x / Urobili: x   Blood: x / Protein: x / Nitrite: x   Leuk Esterase: x / RBC: x / WBC x   Sq Epi: x / Non Sq Epi: x / Bacteria: x        MICROBIOLOGY:    Rapid RVP Result: NotDetec (07-12 @ 16:17)        Troponin T, High Sensitivity Result: 56 (07-12)    Blood Gas Venous - Lactate: 1.1 (07-12 @ 16:17)        RADIOLOGY:  imaging below personally reviewed  < from: CT Chest No Cont (07.12.23 @ 17:11) >  IMPRESSION:  Upper lung predominant bilateral multifocal consolidation with branching   tubular opacities, suggestive of endobronchial spread of infection.   Interval right apical thick-walled cyst since 2019. TB should be   considered in the appropriate clinical setting, among other infectious   etiologies.    Small right pleural effusion.    < end of copied text >  < from: Xray Chest 1 View- PORTABLE-Urgent (07.12.23 @ 15:22) >  Impression:  Redemonstrated right upper lung opacities which appear mildly decreased   as compared with 12/31/2021.    Small bilateral pleural effusions.    < end of copied text >  < from: US Kidney and Bladder (07.12.23 @ 22:59) >  IMPRESSION:    No hydronephrosis. Additional findings as described.    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 Patient's daughter states that she had ongoing cough and dyspnea for two weeks. She did not have any fevers and did not complain of chills, or night sweats. She has been coughing up frothy sputum ranging from clear to green. She was evaluated by Speech therapy in a previous admission and was found to be aspirating. She however was eating a regular solid diet at home.  At the ED, the patient was afebrile and saturating at 94% on RA. WBC was 7.88, H&H 9.6/31.9, BNP 8075, HS Troponin T slightly elevated at 56, and Cr. elevated to 2.07. Cardiology and Nephrology were consulted by the primary team. The patient was empirically started on Ciprofloxacin and Metronidazole as she has a hx of allergies to Nafcillin w/ unknown reaction.   This morning, the patient states that she is feeling better than she did at admission but she is still having significant productive cough. She was started on 3L O2 overnight but she doesn't have dyspnea on RA and feels like she doesn't need it. She denies chest pain, headaches, dizziness, neck stiffness, abdominal pain, dysuria, diarrhea or unhealing wounds.   Notably, the patient was diagnosed with latent TB back in 2017 as she was undergoing evaluation for mastectomy. She was able to get through 1/2 of INH treatment when she developed a rash. Her doctor switched her to a different medication for the remainder of therapy but she does not recall the medication name.        REVIEW OF SYSTEMS  Constitutional: No fevers, chills, weight loss or fatigue   Skin: No rash, no phlebitis	  Eyes: No discharge	  ENMT: No sore throat, oral thrush, ulcers or exudate  Respiratory: Productive cough w/ white-green sputum, Orthopnea   Cardiovascular:  No chest pain, palpitations or edema   Gastrointestinal: No pain, nausea, vomiting, diarrhea or constipation	  Genitourinary: No dysuria, discharge or flank pain  MSK: No arthralgias or back pain   Neurological: No HA, no weakness, no seizures, no AMS       prior hospital charts reviewed [V]  primary team notes reviewed [V]  other consultant notes reviewed [V]    PAST MEDICAL & SURGICAL HISTORY:  Breast CA      Diabetes      Stroke      Cardiac arrest      HTN (hypertension)      H/O mastectomy, bilateral          SOCIAL HISTORY:  - Denied smoking/vaping/alcohol/recreational drug use    FAMILY HISTORY:  No pertinent family history in first degree relatives        Allergies  hydrALAZINE (Rash)  vitamin E (Short breath; Urticaria; Hives)  NIFEdipine (Urticaria; Hives)  doxycycline (Rash)  nafcillin (Unknown)  isoniazid (Rash)        ANTIMICROBIALS:  ciprofloxacin   IVPB 200 every 12 hours  metroNIDAZOLE  IVPB 500 every 8 hours      ANTIMICROBIALS (past 90 days):  MEDICATIONS  (STANDING):    metroNIDAZOLE  IVPB   100 mL/Hr IV Intermittent (07-13-23 @ 09:17)        OTHER MEDS:   MEDICATIONS  (STANDING):  aspirin enteric coated 81 daily  atorvastatin 10 at bedtime  buDESOnide    Inhalation Suspension 0.5 every 12 hours  carvedilol 37.5 every 12 hours  cloNIDine 0.1 two times a day  dextrose 50% Injectable 25 once  dextrose 50% Injectable 12.5 once  dextrose 50% Injectable 25 once  dextrose Oral Gel 15 once PRN  donepezil 10 at bedtime  doxazosin 8 at bedtime  furosemide   Injectable 40 every 12 hours  glucagon  Injectable 1 once  insulin glargine Injectable (LANTUS) 15 at bedtime  insulin lispro (ADMELOG) corrective regimen sliding scale  three times a day before meals  insulin lispro Injectable (ADMELOG) 6 three times a day before meals  letrozole 2.5 daily      VITALS:  Vital Signs Last 24 Hrs  T(F): 97.9 (07-13-23 @ 05:03), Max: 98.5 (07-12-23 @ 19:26)    Vital Signs Last 24 Hrs  HR: 68 (07-13-23 @ 05:03) (63 - 75)  BP: 180/70 (07-13-23 @ 05:03) (163/60 - 196/68)  RR: 20 (07-13-23 @ 05:03)  SpO2: 98% (07-13-23 @ 05:03) (94% - 100%)  Wt(kg): --    EXAM:  General: Patient in no acute distress   HEENT: NCAT, EOMI, PERRL, no oral lesions  CV: S1+S2, no m/r/g appreciated   Lungs: No respiratory distress, Diffuse crackles all lung fields on L side  Abd: Soft, nontender, no guarding, no rebound tenderness, + bowel sounds   Ext: No cyanosis, no edema  Neuro: Alert and oriented, no focal deficits, CN II-XII grossly intact   Skin: No rash   IV: No phlebitis      Labs:                        9.6    7.88  )-----------( 229      ( 12 Jul 2023 16:17 )             31.9     07-12    138  |  104  |  35<H>  ----------------------------<  120<H>  5.1   |  23  |  2.07<H>    Ca    8.9      12 Jul 2023 16:17  Mg     2.7     07-12    TPro  7.8  /  Alb  2.7<L>  /  TBili  0.4  /  DBili  x   /  AST  28  /  ALT  20  /  AlkPhos  119  07-12      WBC Trend:  WBC Count: 7.88 (07-12-23 @ 16:17)  Auto Neutrophil #: 5.39 K/uL (07-12-23 @ 16:17)      Creatine Trend:  Creatinine: 2.07 (07-12)      Liver Biochemical Testing Trend:  Alanine Aminotransferase (ALT/SGPT): 20 (07-12)  Aspartate Aminotransferase (AST/SGOT): 28 (07-12-23 @ 16:17)  Bilirubin Total: 0.4 (07-12)      Trend LDH      Auto Eosinophil %: 3.2 % (07-12-23 @ 16:17)    Urinalysis Basic - ( 12 Jul 2023 16:17 )  Color: x / Appearance: x / SG: x / pH: x  Gluc: 120 mg/dL / Ketone: x  / Bili: x / Urobili: x   Blood: x / Protein: x / Nitrite: x   Leuk Esterase: x / RBC: x / WBC x   Sq Epi: x / Non Sq Epi: x / Bacteria: x        MICROBIOLOGY:    Rapid RVP Result: NotDetec (07-12 @ 16:17)        Troponin T, High Sensitivity Result: 56 (07-12)    Blood Gas Venous - Lactate: 1.1 (07-12 @ 16:17)        RADIOLOGY:  imaging below personally reviewed  < from: CT Chest No Cont (07.12.23 @ 17:11) >  IMPRESSION:  Upper lung predominant bilateral multifocal consolidation with branching   tubular opacities, suggestive of endobronchial spread of infection.   Interval right apical thick-walled cyst since 2019. TB should be   considered in the appropriate clinical setting, among other infectious   etiologies.    Small right pleural effusion.    < end of copied text >  < from: Xray Chest 1 View- PORTABLE-Urgent (07.12.23 @ 15:22) >  Impression:  Redemonstrated right upper lung opacities which appear mildly decreased   as compared with 12/31/2021.    Small bilateral pleural effusions.    < end of copied text >  < from: US Kidney and Bladder (07.12.23 @ 22:59) >  IMPRESSION:    No hydronephrosis. Additional findings as described.    < end of copied text >

## 2023-07-13 NOTE — CONSULT NOTE ADULT - ASSESSMENT
73F female h/o DM, HTN, breast CA, respiratory arrest and cardiac arrest (2018), CVA with residual weakness, h/o trache, PEG, being worked up for asp PNA.   Assessment  DMT2: 73y Female with DM T2 with hyperglycemia, A1C pending, was on oral meds and MIXED insulin at home, now on basal bolus insulin with coverage, blood sugars running high.  Asp PNA: ?chronic aspiration , sputum cultures. Aspiration precautions.   HTN: on antihypertensive medications, monitored, asymptomatic.  CKD: Monitor labs/BMP, renal dosing.     Discussed plan and management wit Dr Naresh Bales NP-TEAMS  Viraj Infante MD  Cell: [phone]  Office: [phone]             73F female h/o DM, HTN, breast CA, respiratory arrest and cardiac arrest (2018), CVA with residual weakness, h/o trache, PEG, being worked up for asp PNA.   Assessment  DMT2: 73y Female with DM T2 with hyperglycemia, A1C pending, was on oral meds and MIXED insulin at home, now on basal bolus insulin with coverage, blood sugars  running high.  Asp PNA: ?chronic aspiration , sputum cultures. Aspiration precautions.   HTN: on antihypertensive medications, monitored, asymptomatic.  CKD: Monitor labs/BMP, renal dosing.     Discussed plan and management wit Dr Naresh Bales NP-TE[facility]  Viraj Infante MD  Cell: [phone]  Office: [phone]

## 2023-07-14 NOTE — SWALLOW VFSS/MBS ASSESSMENT ADULT - SLP GENERAL OBSERVATIONS
Pt received in radiology suite, secure in NATALYA chair, AOx3, +3L o2 via NC, following all directives, verbally making all wants/needs known. 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 Pt noted to be mildly lethargic during exam, suspected 2/2 current medical condition.

## 2023-07-14 NOTE — SWALLOW VFSS/MBS ASSESSMENT ADULT - ADDITIONAL RECOMMENDATIONS
Swallow: 1. Pt/family/caregiver will demonstrate understanding and carryover of dysphagia management (safe swallow guidelines, compensatory strategies, dysphagia diet).  2. Pt will complete dysphagia exercises to improve swallow function.  3. Pt will tolerate recommended diet with no overt, clinical s/s of aspiration.    >Restorative swallow therapy. Will continue to follow while patient is in-house, as schedule permits.  >Pt solids may be advanced at bedside pending pt energy levels/stamina.  >Pt liquids may be advanced to thin liquids at bedside pending pt ability to fully carry-over use of thin tuck strategy in 100% of opportunities. If pt unable to carryover, may consider remaining on mildly thick liquids.  <Repeat MBS may be considered in ~1-2 weeks after dysphagia therapy and pending improvement in pt energy and respiratory condition to re-assess necessity of chin tuck with thin liquids

## 2023-07-14 NOTE — SWALLOW VFSS/MBS ASSESSMENT ADULT - ORAL PHASE COMMENTS
premature spillage up to max in valleculae passive spillage up to max in valleculae Uncontrolled spillover of material up to max in valleculae, max in pyriform sinuses, and descending along arytenoids to below the vocal cords along posterior trachea. Pt reflexive cough effective in ejecting material subglottically and from laryngeal vestibule. premature spillage up to max in valleculae and max in pyriform sinuses

## 2023-07-14 NOTE — SWALLOW VFSS/MBS ASSESSMENT ADULT - DIAGNOSTIC IMPRESSIONS
72y/o F with pmhx of DM, breast CA, respiratory arrest and cardiac arrest, CVA with residual weakness, h/o tracheostomy, PEG, both removed, p/w concern for aspiration PNA vs BRYAN vs other. Pt p/w oropharyngeal dysphagia superimposed by suspected behavioral component and reduced stamina/overall fatigue. Oral Phase: Noted with anterior oral holding of material prior to A-P transit with use of compensatory head extention strategy to facilitate A-P transit, suspected 2/2 behavioral component (dislike of oral contrast) vs oral dysphagia. Premature spillage noted to increase in depth with less viscous consistencies (puree and moderately thick liquids <mildly thick <thin liquids). Pharyngeal Phase: Delayed initiation of swallow. Pt demonstrated ASPIRATION OF THIN LIQUIDS prior to the swallow; reflexive cough effective in ejecting material from airway. Chin tuck effective in eliminating laryngeal penetration and aspiration with thin liquids. Of note, pt exhibited cough at baseline in absence and presence of airway invasion. No laryngeal penetration or aspiration occured with: puree, moderately thick liquids, mildly thick liquids, soft-bite-sized solids.     Disorders: reduced lingual strength/ROM/Rate of motion, tongue-palate seal, delay in trigger of the swallow reflex, reduced hyo-laryngeal elevation and excursion.

## 2023-07-14 NOTE — PROGRESS NOTE ADULT - SUBJECTIVE AND OBJECTIVE BOX
Overnight events noted      VITAL:  T(C): , Max: 37 (07-14-23 @ 01:01)  T(F): , Max: 98.6 (07-14-23 @ 01:01)  HR: 77 (07-14-23 @ 06:01)  BP: 157/68 (07-14-23 @ 06:01)  BP(mean): --  RR: 18 (07-14-23 @ 06:01)  SpO2: 100% (07-14-23 @ 06:01)  Wt(kg): --      PHYSICAL EXAM:  Constitutional: continuously coughing up mucosy phlegm  HEENT: NCAT, MMM  Neck: Supple, No JVD  Respiratory: CTA-b/l  Cardiovascular: RRR s1s2, no m/r/g  Gastrointestinal: BS+, soft, NT/ND  Extremities: No peripheral edema b/l  Neurological: no focal deficits; strength grossly intact  Back: no CVAT b/l  Skin: No rashes, no nevi      LABS:                        9.6    7.88  )-----------( 229      ( 12 Jul 2023 16:17 )             31.9     Na(138)/K(5.1)/Cl(104)/HCO3(23)/BUN(35)/Cr(2.07)Glu(120)/Ca(8.9)/Mg(2.7)/PO4(--)    07-12 @ 16:17        IMPRESSION: 73F w/ HTN, DM2, CVA, breast CA-b/l mastectomy, and CAC-trach/reversal, 7/12/23 p/w cough/sob    (1)Renal - CKD4 with nephrotic-range proteinuria - highly likely from diabetic nephropathy. At/near baseline GFR as of 7/12. Reasonable to use low-dose ARB here, as creatinine is near baseline, and K+, while borderline high on admission, should trend down on Lasix and low-K diet. Whereas SGLT2i therapy could potentially be renoprotective here, these meds are not administered per [facility] policy as inpatient when GFR is this low. I have no objection to holding off with SGLT2i for now    (2)CV - stage 2 hypertension - allergic to CCB and Hydralazine - now on Coreg 37.5mg bid, Clonidine 0.1mg bid, Cardura 8mg qhs, and Lasix 40mg IV BID. I would not object to low-dose ARB here    (3)Hyperkalemia - mild on admission - difficult to achieve low-K diet when reliant on pureed food. If potassium trends up, we can opt for Lokelma.    (4)Anemia - CKD-associated?    (5)Cavitary lung lesion - Pulm and ID on board - being ruled out for TB    (6)Aspiration - on pureed diet; for swallowing eval        RECOMMEND:  (1)Add back Losartan 25mg po qd  (2)Can continue IV Lasix as ordered for now; would cut back to Lasix 40mg po qd if creatinine trends to 2.4 or higher  (3)Can forgo SGLT2i for now  (4)Can forgo dietary potassium restriction, as she is reliant on a pureed diet  (5)Diuretics as ordered for now  (6)BMP daily  (7)Antimicrobials for GFR 20-30ml/min        Jimmy Colon MD  [facility]  Office/on call physician: [phone]  Cell (7a-7k): [phone]       Overnight events noted      VITAL:  T(C): , Max: 37 (07-14-23 @ 01:01)  T(F): , Max: 98.6 (07-14-23 @ 01:01)  HR: 77 (07-14-23 @ 06:01)  BP: 157/68 (07-14-23 @ 06:01)  BP(mean): --  RR: 18 (07-14-23 @ 06:01)  SpO2: 100% (07-14-23 @ 06:01)  Wt(kg): --      PHYSICAL EXAM:  Constitutional: continuously coughing up mucosy phlegm  HEENT: NCAT, MMM  Neck: Supple, No JVD  Respiratory: CTA-b/l  Cardiovascular: RRR s1s2, no m/r/g  Gastrointestinal: BS+, soft, NT/ND  Extremities: No peripheral edema b/l  Neurological: no focal deficits; strength grossly intact  Back: no CVAT b/l  Skin: No rashes, no nevi      LABS:                        9.6    7.88  )-----------( 229      ( 12 Jul 2023 16:17 )             31.9     Na(138)/K(5.1)/Cl(104)/HCO3(23)/BUN(35)/Cr(2.07)Glu(120)/Ca(8.9)/Mg(2.7)/PO4(--)    07-12 @ 16:17        IMPRESSION: 73F w/ HTN, DM2, CVA, breast CA-b/l mastectomy, and CAC-trach/reversal, 7/12/23 p/w cough/sob    (1)Renal - CKD4 with nephrotic-range proteinuria - highly likely from diabetic nephropathy. At/near baseline GFR as of 7/12. Reasonable to use low-dose ARB here, as creatinine is near baseline, and K+, while borderline high on admission, should trend down on Lasix and low-K diet. Whereas SGLT2i therapy could potentially be renoprotective here, these meds are not administered per [facility] policy as inpatient when GFR is this low. I have no objection to holding off with SGLT2i for now    (2)CV - stage 2 hypertension - allergic to CCB and Hydralazine - now on Coreg 37.5mg bid, Clonidine 0.1mg bid, Cardura 8mg qhs, and Lasix 40mg IV BID. I would not object to low-dose ARB here    (3)Hyperkalemia - mild on admission - difficult to achieve low-K diet when reliant on pureed food. If potassium trends up, we can opt for Lokelma.    (4)Anemia - CKD-associated?    (5)Cavitary lung lesion - Pulm and ID on board - being ruled out for TB    (6)Aspiration - on pureed diet; for swallowing eval        RECOMMEND:  (1)Add back Losartan 25mg po qd  (2)Can continue IV Lasix as ordered for now; would cut back to Lasix 40mg po qd if creatinine trends to 2.4 or higher  (3)Can forgo SGLT2i for now  (4)Can forgo dietary potassium restriction, as she is reliant on a pureed diet; Lokelma 10gm po prn K 5.1 or higher  (5)Diuretics as ordered for now  (6)BMP daily  (7)Antimicrobials for GFR 20-30ml/min        Jimmy Colon MD  [facility]  Office/on call physician: [phone]  Cell (7a-7p): [phone]       Son at bedside  Shares that patient is lethargic today - asks if it is due to Clonidine    VITAL:  T(C): , Max: 37 (07-14-23 @ 01:01)  T(F): , Max: 98.6 (07-14-23 @ 01:01)  HR: 77 (07-14-23 @ 06:01)  BP: 157/68 (07-14-23 @ 06:01)  BP(mean): --  RR: 18 (07-14-23 @ 06:01)  SpO2: 100% (07-14-23 @ 06:01)      PHYSICAL EXAM:  Constitutional: lethargic, NAD  HEENT: NCAT, DMM  Neck: Supple, No JVD  Respiratory: CTA-b/l  Cardiovascular: RRR s1s2, no m/r/g  Gastrointestinal: BS+, soft, NT/ND  Extremities: No peripheral edema b/l  Neurological: no focal deficits; strength grossly intact  Back: no CVAT b/l  Skin: No rashes, no nevi      LABS:                        9.6    7.88  )-----------( 229      ( 12 Jul 2023 16:17 )             31.9     Na(138)/K(5.1)/Cl(104)/HCO3(23)/BUN(35)/Cr(2.07)Glu(120)/Ca(8.9)/Mg(2.7)/PO4(--)    07-12 @ 16:17        IMPRESSION: 73F w/ HTN, DM2, CVA, breast CA-b/l mastectomy, and CAC-trach/reversal, 7/12/23 p/w cough/sob    (1)Renal - CKD4 with nephrotic-range proteinuria - highly likely from diabetic nephropathy. At/near baseline GFR as of 7/12. Reasonable to use low-dose ARB here, as creatinine is near baseline, and K+, while borderline high on admission, should trend down on Lasix and low-K diet. Whereas SGLT2i therapy could potentially be renoprotective here, these meds are not administered per [facility] policy as inpatient when GFR is this low. I have no objection to holding off with SGLT2i for now    (2)CV - stage 2 hypertension - allergic to CCB and Hydralazine - now on Coreg 37.5mg bid, Clonidine 0.1mg bid, Cardura 8mg qhs, and Lasix 40mg IV BID. I would not object to low-dose ARB here. Is her fatigue from Clonidine? Unclear, for now.    (3)Hyperkalemia - mild on admission - difficult to achieve low-K diet when reliant on pureed food. If potassium trends up, we can opt for Lokelma.    (4)Anemia - CKD-associated?    (5)Cavitary lung lesion - Pulm and ID on board - being ruled out for TB    (6)Aspiration - on pureed diet; for swallowing eval        RECOMMEND:  (1)Add back Losartan 25mg po qd  (2)Favor continuing Clonidine as ordered for now; can d/c within next 1-2 days if fatigue persists  (3)Can continue IV Lasix as ordered for now; would cut back to Lasix 40mg po qd if creatinine trends to 2.4 or higher  (4)Can forgo SGLT2i for now  (5)Can forgo dietary potassium restriction, as she is reliant on a pureed diet; Lokelma 10gm po prn K 5.1 or higher  (6)Diuretics as ordered for now  (7)BMP daily  (8)Antimicrobials for GFR 20-30ml/min        Jimmy Colon MD  [facility]  Office/on call physician: [phone]  Cell (7a-7p): [phone]

## 2023-07-14 NOTE — SWALLOW VFSS/MBS ASSESSMENT ADULT - ASPIRATION PRECAUTIONS
Monitor for s/s aspiration/laryngeal penetration. If noted:  D/C p.o. intake, provide non-oral nutrition/hydration/meds, and contact this service @ [id number]

## 2023-07-14 NOTE — PROGRESS NOTE ADULT - SUBJECTIVE AND OBJECTIVE BOX
Subjective: Patient seen and examined. No new events except as noted.     REVIEW OF SYSTEMS:    CONSTITUTIONAL:+ weakness, fevers or chills  EYES/ENT: No visual changes;  No vertigo or throat pain   NECK: No pain or stiffness  RESPIRATORY: No cough, wheezing, hemoptysis; No shortness of breath  CARDIOVASCULAR: No chest pain or palpitations  GASTROINTESTINAL: No abdominal or epigastric pain. No nausea, vomiting, or hematemesis; No diarrhea or constipation. No melena or hematochezia.  GENITOURINARY: No dysuria, frequency or hematuria  NEUROLOGICAL: No numbness or weakness  SKIN: No itching, burning, rashes, or lesions   All other review of systems is negative unless indicated above.    MEDICATIONS:  MEDICATIONS  (STANDING):  aspirin enteric coated 81 milliGRAM(s) Oral daily  atorvastatin 10 milliGRAM(s) Oral at bedtime  buDESOnide    Inhalation Suspension 0.5 milliGRAM(s) Inhalation every 12 hours  carvedilol 37.5 milliGRAM(s) Oral every 12 hours  cefepime   IVPB      cefepime   IVPB 1000 milliGRAM(s) IV Intermittent every 24 hours  cholecalciferol 2000 Unit(s) Oral daily  cloNIDine 0.1 milliGRAM(s) Oral two times a day  dextrose 5%. 1000 milliLiter(s) (100 mL/Hr) IV Continuous <Continuous>  dextrose 5%. 1000 milliLiter(s) (50 mL/Hr) IV Continuous <Continuous>  dextrose 50% Injectable 25 Gram(s) IV Push once  dextrose 50% Injectable 12.5 Gram(s) IV Push once  dextrose 50% Injectable 25 Gram(s) IV Push once  donepezil 10 milliGRAM(s) Oral at bedtime  doxazosin 8 milliGRAM(s) Oral at bedtime  furosemide   Injectable 40 milliGRAM(s) IV Push every 12 hours  glucagon  Injectable 1 milliGRAM(s) IntraMuscular once  insulin glargine Injectable (LANTUS) 18 Unit(s) SubCutaneous at bedtime  insulin lispro (ADMELOG) corrective regimen sliding scale   SubCutaneous three times a day before meals  insulin lispro Injectable (ADMELOG) 6 Unit(s) SubCutaneous three times a day before meals  letrozole 2.5 milliGRAM(s) Oral daily  Nephro-stacia 1 Tablet(s) Oral daily      PHYSICAL EXAM:  T(C): 36.9 (07-14-23 @ 06:01), Max: 37 (07-14-23 @ 01:01)  HR: 77 (07-14-23 @ 06:01) (66 - 85)  BP: 157/68 (07-14-23 @ 06:01) (157/68 - 178/86)  RR: 18 (07-14-23 @ 06:01) (18 - 18)  SpO2: 100% (07-14-23 @ 06:01) (95% - 100%)  Wt(kg): --  I&O's Summary            Appearance: NAD 3 liters NC   HEENT:   Normal oral mucosa, PERRL, EOMI	  Lymphatic: No lymphadenopathy  Cardiovascular: Normal S1 S2, No JVD, No murmurs, No edema  Respiratory: decreased bs   Psychiatry: A & O x 3, Mood & affect appropriate  Gastrointestinal:  Soft, Non-tender, + BS	  Skin: No rashes, No ecchymoses, No cyanosis	  Neurologic: Non-focal  Extremities: Normal range of motion, No clubbing, cyanosis or edema  Vascular: Peripheral pulses palpable 2+ bilaterally        LABS:    CARDIAC MARKERS:                                9.6    7.88  )-----------( 229      ( 12 Jul 2023 16:17 )             31.9     07-12    138  |  104  |  35<H>  ----------------------------<  120<H>  5.1   |  23  |  2.07<H>    Ca    8.9      12 Jul 2023 16:17  Mg     2.7     07-12    TPro  7.8  /  Alb  2.7<L>  /  TBili  0.4  /  DBili  x   /  AST  28  /  ALT  20  /  AlkPhos  119  07-12    proBNP:   Lipid Profile:   HgA1c:   TSH:             TELEMETRY: 	    ECG:  	  RADIOLOGY:   DIAGNOSTIC TESTING:  [ ] Echocardiogram:  < from: TTE W or WO Ultrasound Enhancing Agent (07.13.23 @ 08:06) >    TRANSTHORACIC ECHOCARDIOGRAM REPORT  ________________________________________________________________________________                                      _______       Pt. Name:       MILANA BALL Study Date:    7/13/2023  MRN:            [id number]         YOB: 1950  Accession #:    [id number]          Age:           73 years  Account#:       [id number]       Gender:        F  Heart Rate:                        Height:        60.00 in (152.40 cm)  Rhythm:           Weight:        130.00 lb (58.97 kg)  Blood Pressure: 180/70 mmHg        BSA/BMI:       1.55 m² / 25.39 kg/m²  ________________________________________________________________________________________  Referring Physician:    [id number] Rubi Rossi  Interpreting Physician: Carlos Hernandez M.D.  Primary Sonographer:    Roel Viramontes RDCS    CPT:               ECHO TTE WO CON COMP W DOPP - 20893.m  Indication(s):     Abnormal electrocardiogram ECG/EKG - R94.31  Procedure:Transthoracic echocardiogram with 2-D, M-mode and complete                     spectral and color flow Doppler.  Ordering Location: [facility]  Admission Status:  ED    _______________________________________________________________________________________  CONCLUSIONS:      1. The left ventricular systolic function is normal with an ejection fraction of 59 % by Hammer's method of disks.   2. Normal right ventricular systolic function. The tricuspid annular plane systolic excursion (TAPSE) is 2.1 cm (normal >=1.7 cm).   3. No pericardial effusion seen.   4. Compared to the transthoracic echocardiogram performed on 2/26/2018 there is LVH.   5. Mild aortic stenosis.   6. There is increased LV mass and concentric hypertrophy.    ________________________________________________________________________________________  FINDINGS:     Left Ventricle:  The left ventricular systolic function is normal with a calculated ejection fraction of 59 % by the Hammer's biplane method of disks. There is mild (grade 1) left ventricular diastolic dysfunction, with normal filling pressure. Analysis of left ventricular diastolic function and filling pressure is made challenging by the presence of mitral annular calcification. Severe left ventricular hypertrophy. There is increased LV mass and concentric hypertrophy. Left ventricular endocardium is not well visualized; however, the left ventricular systolic function appears grossly normal.     Right Ventricle:  Normal right ventricular cavity size, normal wall thickness and normal right ventricular systolic function. Tricuspid annular plane systolic excursion (TAPSE) is 2.1 cm (normal >=1.7 cm). Tricuspid annular tissue Doppler S' is 10.8 cm/s (normal >10 cm/s).     Left Atrium:  The left atrium is normal with an indexed volume of 33.13 ml/m².     Right Atrium:  The right atrium is normal with an indexed volume of 17.69 ml/m².     Aortic Valve:  The aortic valve appears trileaflet. There is mild calcification of the aortic valve leaflets. There is mild aortic stenosis. The peak transaortic velocity is 2.27 m/s, peak transaortic gradient is 20.6 mmHg and mean transaortic gradient is 10.0 mmHg with an LVOT/aortic valve VTI ratio of 0.40. The aortic valve acceleration time is 100 msec.     Mitral Valve:  Structurally normal mitral valve with normal leaflet opening and closing, without any evidence of mitral stenosis or significant regurgitation. There is calcification of the mitral valve annulus. There is trace mitral regurgitation.     Tricuspid Valve:  Structurally normal tricuspid valve with normal leaflet excursion. There is trace tricuspid regurgitation.     Pulmonic Valve:  Structurally normal pulmonic valve with normal leaflet excursion. There is trace pulmonic regurgitation.     Aorta:  The aortic annulus and aortic root appear normal in size. Normal aorta sinus of Valsalva, measuring 2.70 cm (indexed 1.74 cm/m²).     Pericardium:  No pericardial effusion seen.     Systemic Veins:  The inferior vena cava is normal measuring 1.40 cm in diameter, (normal <2.1cm) with normal inspiratory collapse (normal >50%) consistent with normal right atrial pressure (~3, range 0-5mmHg).  ____________________________________________________________________  Quantitative Data:  Left Ventricle Measurements: (Indexed to BSA)     IVSd (2D):   1.4 cm  LVPWd (2D):  1.3 cm  LVIDd (2D):  4.1 cm  LVIDs (2D):  2.8 cm  LV Mass:     205 g  131.8 g/m²  LV Vol d, MOD A2C: 110.0 ml 70.77 ml/m²  LV Vol d, MOD A4C: 71.7 ml  46.13 ml/m²  LV Vol d, MOD BP:88.5 ml  56.94 ml/m²  LV Vol s, MOD A2C: 47.6 ml  30.62 ml/m²  LV Vol s, MOD A4C: 24.8 ml  15.96 ml/m²  LV Vol s, MOD BP:  36.4 ml  23.43 ml/m²  LVOT SV MOD BP:    52.1 ml  LV EF% MOD BP:     59 %     MV E Vmax:    0.98 m/s  MV A Vmax:    1.39 m/s  MV E/A:       0.71  e' lateral:   5.27 cm/s  e' medial:    4.78 cm/s  E/e' lateral: 18.69  E/e' medial:  20.61  E/e' Average: 19.60    Aorta Measurements:     Ao Sinus:      2.7 cm  Ao Root s, 2D: 2.7 cm  Ao Asc:        2.8 cm       Left Atrium Measurements: (Indexed to BSA)  LA Diam 2D: 3.60 cm    Right Ventricle Measurements: Right Atrial Measurements:     TAPSE:           2.1 cm       RA Vol:       27.50 ml  RV Base (RVID1): 3.4 cm       RA Vol Index: 17.69 ml/m²  RV Mid (RVID2):  2.8 cm       LVOT / RVOT/ Qp/Qs Data: (Indexed to BSA)  LVOT Vmax: 0.93 m/s  LVOT VTI:  23.00 cm    Aortic Valve Measurements:  AV Vmax:          227.0 cm/s  AV Peak Gradient: 20.6 mmHg  AV Mean Gradient: 10.0 mmHg  AV VTI:           57.5 cm  AV VTI Ratio:     0.40    Mitral Valve Measurements:     MV E Vmax: 1.0 m/s  MV A Vmax: 1.4 m/s  MV E/A:    0.7       Tricuspid Valve Measurements:     RA Pressure: 3 mmHg    ________________________________________________________________________________________  Electronically signed on 7/13/2023 at 11:32:35 AM by Carlos Hernandez M.D.         *** Final ***    < end of copied text >    [ ]  Catheterization:  [ ] Stress Test:    OTHER:

## 2023-07-14 NOTE — PROGRESS NOTE ADULT - ASSESSMENT
72 y/o female with multiple medical problems whom i see in my practice on outptn basis. last home visit marichuy 15   Ptn has h/o DM, HTN, breast CA, respiratory arrest and cardiac arrest (2018), CVA with residual weakness, h/o trache, PEG, both removed, now eating on her own but aspirates and has post prandial cough in addition to chronic productive cough. presumed 2/2 silent aspiration of her saliva this was d/w Dr. Jose Almendarez who is in agreement    Today pnt  presents to the ED with  complaining of worsening cough and orthopnea. ptn has chronic LE edema.   Denies any headache, fever, chills, chest pain, abdominal pain, n/v/d, dysuria. Unable to obtain any history or ROS due to mental status.    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 and hs is from highly endemic area for tb  -agree with ruling out tb  -if fluoroquinolones can be avoided,  that would be better:  however ID is following:   -She had cyst in TUL on previous 2019 ct scan chest  : but  this cavity seems to be in a different location to me:   -she likely is aspirating too : and speech and swallow should be done: antibiotics per iD given lot of allergies:   -she has mediastinal lymphadenopathy also : needs to be followed up    -she is using vest management at home:  will start again   -now antibtiocs changed to cefepime:  cont to rule out afb:  and to make sure she uses veat therapy  acute on chronic CHF  -echo noted:   -defer to primary cards team   RUSS   -she likely has ac on chr CKD:  -she always had high creat before:   HTN  -her blood pressure is slightly had:   -cont to optimize anti hypertensives:   DM  monitor and control :    dvt prophylaxis  dw acp

## 2023-07-14 NOTE — PROGRESS NOTE ADULT - SUBJECTIVE AND OBJECTIVE BOX
Date of Service: 07-14-23 @ 15:00    Patient is a 73y old  Female who presents with a chief complaint of respiratory distress (14 Jul 2023 10:11)      Any change in ROS:  doing ok : still with lot of phlegm in chest     MEDICATIONS  (STANDING):  aspirin enteric coated 81 milliGRAM(s) Oral daily  atorvastatin 10 milliGRAM(s) Oral at bedtime  buDESOnide    Inhalation Suspension 0.5 milliGRAM(s) Inhalation every 12 hours  carvedilol 37.5 milliGRAM(s) Oral every 12 hours  cefepime   IVPB      cefepime   IVPB 1000 milliGRAM(s) IV Intermittent every 24 hours  chlorhexidine 2% Cloths 1 Application(s) Topical <User Schedule>  cholecalciferol 2000 Unit(s) Oral daily  cloNIDine 0.1 milliGRAM(s) Oral two times a day  dextrose 5%. 1000 milliLiter(s) (50 mL/Hr) IV Continuous <Continuous>  dextrose 5%. 1000 milliLiter(s) (100 mL/Hr) IV Continuous <Continuous>  dextrose 50% Injectable 25 Gram(s) IV Push once  dextrose 50% Injectable 25 Gram(s) IV Push once  dextrose 50% Injectable 12.5 Gram(s) IV Push once  donepezil 10 milliGRAM(s) Oral at bedtime  doxazosin 8 milliGRAM(s) Oral at bedtime  furosemide   Injectable 40 milliGRAM(s) IV Push every 12 hours  glucagon  Injectable 1 milliGRAM(s) IntraMuscular once  insulin glargine Injectable (LANTUS) 18 Unit(s) SubCutaneous at bedtime  insulin lispro (ADMELOG) corrective regimen sliding scale   SubCutaneous three times a day before meals  insulin lispro Injectable (ADMELOG) 6 Unit(s) SubCutaneous three times a day before meals  letrozole 2.5 milliGRAM(s) Oral daily  Nephro-stacia 1 Tablet(s) Oral daily    MEDICATIONS  (PRN):  dextrose Oral Gel 15 Gram(s) Oral once PRN Blood Glucose LESS THAN 70 milliGRAM(s)/deciliter  guaiFENesin Oral Liquid (Sugar-Free) 100 milliGRAM(s) Oral every 6 hours PRN Cough    Vital Signs Last 24 Hrs  T(C): 37.1 (14 Jul 2023 13:30), Max: 37.1 (14 Jul 2023 13:30)  T(F): 98.8 (14 Jul 2023 13:30), Max: 98.8 (14 Jul 2023 13:30)  HR: 66 (14 Jul 2023 13:30) (66 - 85)  BP: 156/80 (14 Jul 2023 13:30) (156/80 - 178/86)  BP(mean): --  RR: 18 (14 Jul 2023 13:30) (18 - 18)  SpO2: 98% (14 Jul 2023 13:30) (95% - 100%)    Parameters below as of 14 Jul 2023 13:30  Patient On (Oxygen Delivery Method): nasal cannula  O2 Flow (L/min): 3      I&O's Summary        Physical Exam:   GENERAL: NAD, well-groomed, well-developed  HEENT: MANDY/   Atraumatic, Normocephalic  ENMT: No tonsillar erythema, exudates, or enlargement; Moist mucous membranes, Good dentition, No lesions  NECK: Supple, No JVD, Normal thyroid  CHEST/LUNG: coarse cracekls  CVS: Regular rate and rhythm; No murmurs, rubs, or gallops  GI: : Soft, Nontender, Nondistended; Bowel sounds present  NERVOUS SYSTEM:  Alert & awake  EXTREMITIES:- edema  LYMPH: No lymphadenopathy noted  SKIN: No rashes or lesions  ENDOCRINOLOGY: No Thyromegaly  PSYCH: Appropriate    Labs:  30                            9.1    6.31  )-----------( 197      ( 14 Jul 2023 11:18 )             30.2                         9.6    7.88  )-----------( 229      ( 12 Jul 2023 16:17 )             31.9     07-14    141  |  100  |  39<H>  ----------------------------<  204<H>  4.4   |  29  |  2.24<H>  07-12    138  |  104  |  35<H>  ----------------------------<  120<H>  5.1   |  23  |  2.07<H>    Ca    8.7      14 Jul 2023 11:18  Ca    8.9      12 Jul 2023 16:17  Mg     2.7     07-12    TPro  7.8  /  Alb  2.7<L>  /  TBili  0.4  /  DBili  x   /  AST  28  /  ALT  20  /  AlkPhos  119  07-12    CAPILLARY BLOOD GLUCOSE      POCT Blood Glucose.: 115 mg/dL (14 Jul 2023 13:02)  POCT Blood Glucose.: 198 mg/dL (14 Jul 2023 08:48)  POCT Blood Glucose.: 249 mg/dL (13 Jul 2023 21:49)  POCT Blood Glucose.: 183 mg/dL (13 Jul 2023 18:44)      LIVER FUNCTIONS - ( 12 Jul 2023 16:17 )  Alb: 2.7 g/dL / Pro: 7.8 g/dL / ALK PHOS: 119 U/L / ALT: 20 U/L / AST: 28 U/L / GGT: x             Urinalysis Basic - ( 14 Jul 2023 11:18 )    Color: x / Appearance: x / SG: x / pH: x  Gluc: 204 mg/dL / Ketone: x  / Bili: x / Urobili: x   Blood: x / Protein: x / Nitrite: x   Leuk Esterase: x / RBC: x / WBC x   Sq Epi: x / Non Sq Epi: x / Bacteria: x    rad< from: CT Chest No Cont (07.12.23 @ 17:11) >  arterial and mitral annular calcification. The great vessels are normal   in size.    VISUALIZED UPPER ABDOMEN: This artery calcifications. The kidneys are   partially atrophic.    BONES/SOFT TISSUES: Bilateral mastectomies. Cardiac loop recorder.   Chronic L1 compression deformity, unchanged since December 2021 abdominal   CT. Old sternal and bilateral rib fractures may be related to prior   cardiopulmonary resuscitation. Otherwise no aggressive osseous lesion.    IMPRESSION:  Upper lung predominant bilateral multifocal consolidation with branching   tubular opacities, suggestive of endobronchial spread of infection.   Interval right apical thick-walled cyst since 2019. TB should be   considered in the appropriate clinical setting, among other infectious   etiologies.    Small right pleural effusion.        < end of copied text >          RECENT CULTURES:        RESPIRATORY CULTURES:          Studies  Chest X-RAY  CT SCAN Chest   Venous Dopplers: LE:   CT Abdomen  Others

## 2023-07-14 NOTE — PROGRESS NOTE ADULT - SUBJECTIVE AND OBJECTIVE BOX
Follow Up:  pneumonia    Interval History/ROS: sleeping much of day    Allergies  hydrALAZINE (Rash)  vitamin E (Short breath; Urticaria; Hives)  NIFEdipine (Urticaria; Hives)  doxycycline (Rash)  nafcillin (Unknown)  isoniazid (Rash)      ANTIMICROBIALS:  cefepime   IVPB 1000 every 24 hours  cefepime   IVPB        OTHER MEDS:  MEDICATIONS  (STANDING):  aspirin enteric coated 81 daily  atorvastatin 10 at bedtime  buDESOnide    Inhalation Suspension 0.5 every 12 hours  carvedilol 37.5 every 12 hours  cloNIDine 0.1 two times a day  dextrose 50% Injectable 25 once  dextrose 50% Injectable 25 once  dextrose 50% Injectable 12.5 once  dextrose Oral Gel 15 once PRN  donepezil 10 at bedtime  doxazosin 8 at bedtime  furosemide   Injectable 40 every 12 hours  glucagon  Injectable 1 once  guaiFENesin Oral Liquid (Sugar-Free) 100 every 6 hours PRN  insulin glargine Injectable (LANTUS) 18 at bedtime  insulin lispro (ADMELOG) corrective regimen sliding scale  three times a day before meals  insulin lispro Injectable (ADMELOG) 6 three times a day before meals  letrozole 2.5 daily      Vital Signs Last 24 Hrs  T(C): 37.1 (14 Jul 2023 13:30), Max: 37.1 (14 Jul 2023 13:30)  T(F): 98.8 (14 Jul 2023 13:30), Max: 98.8 (14 Jul 2023 13:30)  HR: 66 (14 Jul 2023 13:30) (66 - 85)  BP: 156/80 (14 Jul 2023 13:30) (156/80 - 178/86)  BP(mean): --  RR: 18 (14 Jul 2023 13:30) (18 - 18)  SpO2: 98% (14 Jul 2023 13:30) (95% - 100%)    Parameters below as of 14 Jul 2023 13:30  Patient On (Oxygen Delivery Method): nasal cannula  O2 Flow (L/min): 3      PHYSICAL EXAM:  General: WN/WD NAD, Non-toxic  Neurology: A&Ox3, nonfocal  Respiratory: scattered fine rhonchi  CV: RRR, S1S2, no murmurs, rubs or gallops  Abdominal: Soft, Non-tender, non-distended, normal bowel sounds  Extremities: No edema,  Line Sites: Clear  Skin: No rash                          9.1    6.31  )-----------( 197      ( 14 Jul 2023 11:18 )             30.2   WBC Count: 6.31 (07-14 @ 11:18)  WBC Count: 7.88 (07-12 @ 16:17)    07-14    141  |  100  |  39<H>  ----------------------------<  204<H>  4.4   |  29  |  2.24<H>    Ca    8.7      14 Jul 2023 11:18      Urinalysis Basic - ( 14 Jul 2023 11:18 )    Color: x / Appearance: x / SG: x / pH: x  Gluc: 204 mg/dL / Ketone: x  / Bili: x / Urobili: x   Blood: x / Protein: x / Nitrite: x   Leuk Esterase: x / RBC: x / WBC x   Sq Epi: x / Non Sq Epi: x / Bacteria: x    MICROBIOLOGY:  Rapid RVP Result: NotDetec (07-12 @ 16:17)      RADIOLOGY:  < from: US Kidney and Bladder (07.12.23 @ 22:59) >  FINDINGS:    Right kidney: 9.2 cm in length. No hydronephrosis. Increased   echogenicity, reflecting partial disease.    Left kidney: 8.5 cm in length. No hydronephrosis. Increased echogenicity,   reflecting partial disease.    Bladder: Partially distended.    IMPRESSION:    No hydronephrosis. Additional findings as described.    < end of copied text >  < from: CT Chest No Cont (07.12.23 @ 17:11) >  IMPRESSION:  Upper lung predominant bilateral multifocal consolidation with branching   tubular opacities, suggestive of endobronchial spread of infection.   Interval right apical thick-walled cyst since 2019. TB should be   considered in the appropriate clinical setting, among other infectious   etiologies.    Small right pleural effusion.    < end of copied text >      Jas Durand MD; Division of Infectious Disease; Pager: [phone]; nights and weekends: [phone]

## 2023-07-14 NOTE — PROGRESS NOTE ADULT - SUBJECTIVE AND OBJECTIVE BOX
Patient is a 73y old  Female who presents with a chief complaint of respiratory distress (14 Jul 2023 16:25)      SUBJECTIVE / OVERNIGHT EVENTS: MBS done: recs are small soft bite food w mildly thickened liquids. renal, card, ID, pulm, endo input appreciated    MEDICATIONS  (STANDING):  aspirin enteric coated 81 milliGRAM(s) Oral daily  atorvastatin 10 milliGRAM(s) Oral at bedtime  buDESOnide    Inhalation Suspension 0.5 milliGRAM(s) Inhalation every 12 hours  carvedilol 37.5 milliGRAM(s) Oral every 12 hours  cefepime   IVPB 1000 milliGRAM(s) IV Intermittent every 24 hours  cefepime   IVPB      chlorhexidine 2% Cloths 1 Application(s) Topical <User Schedule>  cholecalciferol 2000 Unit(s) Oral daily  cloNIDine 0.1 milliGRAM(s) Oral two times a day  dextrose 5%. 1000 milliLiter(s) (100 mL/Hr) IV Continuous <Continuous>  dextrose 5%. 1000 milliLiter(s) (50 mL/Hr) IV Continuous <Continuous>  dextrose 50% Injectable 25 Gram(s) IV Push once  dextrose 50% Injectable 25 Gram(s) IV Push once  dextrose 50% Injectable 12.5 Gram(s) IV Push once  donepezil 10 milliGRAM(s) Oral at bedtime  doxazosin 8 milliGRAM(s) Oral at bedtime  furosemide   Injectable 40 milliGRAM(s) IV Push every 12 hours  glucagon  Injectable 1 milliGRAM(s) IntraMuscular once  insulin glargine Injectable (LANTUS) 18 Unit(s) SubCutaneous at bedtime  insulin lispro (ADMELOG) corrective regimen sliding scale   SubCutaneous three times a day before meals  insulin lispro Injectable (ADMELOG) 6 Unit(s) SubCutaneous three times a day before meals  letrozole 2.5 milliGRAM(s) Oral daily  Nephro-stacia 1 Tablet(s) Oral daily  polyethylene glycol 3350 17 Gram(s) Oral two times a day    MEDICATIONS  (PRN):  dextrose Oral Gel 15 Gram(s) Oral once PRN Blood Glucose LESS THAN 70 milliGRAM(s)/deciliter  guaiFENesin Oral Liquid (Sugar-Free) 100 milliGRAM(s) Oral every 6 hours PRN Cough      Vital Signs Last 24 Hrs  T(F): 98.8 (07-14-23 @ 13:30), Max: 98.8 (07-14-23 @ 13:30)  HR: 80 (07-14-23 @ 17:53) (66 - 85)  BP: 151/81 (07-14-23 @ 17:53) (151/81 - 178/86)  RR: 18 (07-14-23 @ 13:30) (18 - 18)  SpO2: 98% (07-14-23 @ 13:30) (95% - 100%)  Telemetry:   CAPILLARY BLOOD GLUCOSE      POCT Blood Glucose.: 239 mg/dL (14 Jul 2023 16:54)  POCT Blood Glucose.: 115 mg/dL (14 Jul 2023 13:02)  POCT Blood Glucose.: 198 mg/dL (14 Jul 2023 08:48)  POCT Blood Glucose.: 249 mg/dL (13 Jul 2023 21:49)    I&O's Summary    14 Jul 2023 07:01  -  14 Jul 2023 20:13  --------------------------------------------------------  IN: 480 mL / OUT: 450 mL / NET: 30 mL        PHYSICAL EXAM:  GENERAL: NAD, well-developed  HEAD:  Atraumatic, Normocephalic  EYES: EOMI, PERRLA, conjunctiva and sclera clear  NECK: Supple, No JVD  CHEST/LUNG: Clear to auscultation bilaterally; No wheeze  HEART: Regular rate and rhythm; No murmurs, rubs, or gallops  ABDOMEN: Soft, Nontender, Nondistended; Bowel sounds present  EXTREMITIES:  2+ Peripheral Pulses, No clubbing, cyanosis, or edema  PSYCH: AAOx3  NEUROLOGY: non-focal  SKIN: No rashes or lesions    LABS:                        9.1    6.31  )-----------( 197      ( 14 Jul 2023 11:18 )             30.2     07-14    141  |  100  |  39<H>  ----------------------------<  204<H>  4.4   |  29  |  2.24<H>    Ca    8.7      14 Jul 2023 11:18            Urinalysis Basic - ( 14 Jul 2023 11:18 )    Color: x / Appearance: x / SG: x / pH: x  Gluc: 204 mg/dL / Ketone: x  / Bili: x / Urobili: x   Blood: x / Protein: x / Nitrite: x   Leuk Esterase: x / RBC: x / WBC x   Sq Epi: x / Non Sq Epi: x / Bacteria: x        RADIOLOGY & ADDITIONAL TESTS:    Imaging Personally Reviewed:    Consultant(s) Notes Reviewed:      Care Discussed with Consultants/Other Providers:

## 2023-07-14 NOTE — SWALLOW VFSS/MBS ASSESSMENT ADULT - PHARYNGEAL PHASE COMMENTS
Spray Paint Technique: No Medical Necessity Information: It is in your best interest to select a reason for this procedure from the list below. All of these items fulfill various CMS LCD requirements except the new and changing color options. Medical Necessity Clause: This procedure was medically necessary because the lesions that were treated were: Spray Paint Text: The liquid nitrogen was applied to the skin utilizing a spray paint frosting technique. Post-Care Instructions: I reviewed with the patient in detail post-care instructions. Patient is to wear sunprotection, and avoid picking at any of the treated lesions. Pt may apply Vaseline to crusted or scabbing areas. Show Topical Anesthesia Variable?: Yes Consent: The patient's consent was obtained including but not limited to risks of crusting, scabbing, blistering, scarring, darker or lighter pigmentary change, recurrence, incomplete removal and infection. Detail Level: Detailed Presence of post prandial cough in absence of laryngeal penetration/aspiration

## 2023-07-14 NOTE — SWALLOW VFSS/MBS ASSESSMENT ADULT - ORAL PREP COMMENTS
Anterior oral holding of material prior to A-P transit with head rocking multiple times from neutral position to head extended position for A-P transit, suspected 2/2 behavioral component (dislike of oral contrast) vs oral dysphagia. Family reporting that pt often exhibits this behavior when eating something she dislikes. Anterior oral holding of material prior to A-P transit with use of head extention position for A-P transit, suspected 2/2 behavioral component (dislike of oral contrast) vs oral dysphagia. Family reporting that pt often exhibits this behavior when eating something she dislikes.

## 2023-07-14 NOTE — PROGRESS NOTE ADULT - ASSESSMENT
73F female h/o DM, HTN, breast CA, respiratory arrest and cardiac arrest (2018), CVA with residual weakness, h/o trache, PEG, being worked up for asp PNA.   Assessment  DMT2: 73y Female with DM T2 with hyperglycemia, A1C pending, was on oral meds and MIXED insulin at home, now on basal bolus insulin with coverage, blood sugars running high.  Asp PNA: ?chronic aspiration , sputum cultures. Aspiration precautions.   HTN: on antihypertensive medications, monitored, asymptomatic.  CKD: Monitor labs/BMP, renal dosing.     Discussed plan and management wit Dr Naresh Bales NP-TEAMS  Viraj Infante MD  Cell: [phone]  Office: [phone]             73F female h/o DM, HTN, breast CA, respiratory arrest and cardiac arrest (2018), CVA with residual weakness, h/o trache, PEG, being worked up for asp PNA.   Assessment  DMT2: 73y Female with DM T2 with hyperglycemia, A1C pending, was on oral meds and MIXED insulin at home, now on basal bolus insulin  with coverage, blood sugars running high.  Asp PNA: ?chronic aspiration , sputum cultures. Aspiration precautions.   HTN: on antihypertensive medications, monitored, asymptomatic.  CKD: Monitor labs/BMP, renal dosing.     Discussed plan and management wit Dr Naresh Bales NP-TEAMS  Viraj Infante MD  Cell: [phone]  Office: [phone]

## 2023-07-14 NOTE — SWALLOW VFSS/MBS ASSESSMENT ADULT - COMMENTS
hx con't  Cardiology following for leg edema and SOB> multifactorial, likely aspiration PNA and diastolic heart failure.    7/13 Ptn looks better today, more communicative, smiling, eating rice pudding, coughing after each bite. awaitng S&S, will need MBS. ptn is hungry. she used to have a PEG, was removed few years ago.    CTchest 7/12/23 IMPRESSION: Upper lung predominant bilateral multifocal consolidation with branching tubular opacities, suggestive of endobronchial spread of infection. Interval right apical thick-walled cyst since 2019. TB should be considered in the appropriate clinical setting,among other infectious etiologies.  Small right pleural effusion.    Pt very well known to this service since February 2018 with multiple bedside swallow exams and FEES exams. Most recent recommendations on bedside swallow exam 1/2/2022 were for regular texture solids and thin liquids - at this time, pt did not have trach and peg. Most recent objective swallow testing completed at  with MBS results (4/16/2018) which found delayed oral prep and delayed pharyngeal trigger on food textures along with penetration and spontaneous retrieval of thin liquids via straw. Patient was +trach, +pmv at that time.  7/13 initial bedside swallow exam with recommendations for puree and moderately thick liquids and MBS.

## 2023-07-14 NOTE — PROGRESS NOTE ADULT - SUBJECTIVE AND OBJECTIVE BOX
Chief complaint  Patient is a 73y old  Female who presents with a chief complaint of respiratory distress (13 Jul 2023 13:00)         Labs and Fingersticks  CAPILLARY BLOOD GLUCOSE      POCT Blood Glucose.: 198 mg/dL (14 Jul 2023 08:48)  POCT Blood Glucose.: 249 mg/dL (13 Jul 2023 21:49)  POCT Blood Glucose.: 183 mg/dL (13 Jul 2023 18:44)  POCT Blood Glucose.: 327 mg/dL (13 Jul 2023 11:37)      Anion Gap: 11 (07-12 @ 16:17)      Calcium: 8.9 (07-12 @ 16:17)  Albumin: 2.7 *L* (07-12 @ 16:17)    Alanine Aminotransferase (ALT/SGPT): 20 (07-12 @ 16:17)  Alkaline Phosphatase: 119 (07-12 @ 16:17)  Aspartate Aminotransferase (AST/SGOT): 28 (07-12 @ 16:17)        07-12    138  |  104  |  35<H>  ----------------------------<  120<H>  5.1   |  23  |  2.07<H>    Ca    8.9      12 Jul 2023 16:17  Mg     2.7     07-12    TPro  7.8  /  Alb  2.7<L>  /  TBili  0.4  /  DBili  x   /  AST  28  /  ALT  20  /  AlkPhos  119  07-12                        9.6    7.88  )-----------( 229      ( 12 Jul 2023 16:17 )             31.9     Medications  MEDICATIONS  (STANDING):  aspirin enteric coated 81 milliGRAM(s) Oral daily  atorvastatin 10 milliGRAM(s) Oral at bedtime  buDESOnide    Inhalation Suspension 0.5 milliGRAM(s) Inhalation every 12 hours  carvedilol 37.5 milliGRAM(s) Oral every 12 hours  cefepime   IVPB      cefepime   IVPB 1000 milliGRAM(s) IV Intermittent every 24 hours  cholecalciferol 2000 Unit(s) Oral daily  cloNIDine 0.1 milliGRAM(s) Oral two times a day  dextrose 5%. 1000 milliLiter(s) (100 mL/Hr) IV Continuous <Continuous>  dextrose 5%. 1000 milliLiter(s) (50 mL/Hr) IV Continuous <Continuous>  dextrose 50% Injectable 25 Gram(s) IV Push once  dextrose 50% Injectable 12.5 Gram(s) IV Push once  dextrose 50% Injectable 25 Gram(s) IV Push once  donepezil 10 milliGRAM(s) Oral at bedtime  doxazosin 8 milliGRAM(s) Oral at bedtime  furosemide   Injectable 40 milliGRAM(s) IV Push every 12 hours  glucagon  Injectable 1 milliGRAM(s) IntraMuscular once  insulin glargine Injectable (LANTUS) 18 Unit(s) SubCutaneous at bedtime  insulin lispro (ADMELOG) corrective regimen sliding scale   SubCutaneous three times a day before meals  insulin lispro Injectable (ADMELOG) 6 Unit(s) SubCutaneous three times a day before meals  letrozole 2.5 milliGRAM(s) Oral daily  Nephro-stacia 1 Tablet(s) Oral daily      Physical Exam  General: Patient comfortable in bed   Vital Signs Last 12 Hrs  T(F): 98.4 (07-14-23 @ 06:01), Max: 98.6 (07-14-23 @ 01:01)  HR: 77 (07-14-23 @ 06:01) (66 - 85)  BP: 157/68 (07-14-23 @ 06:01) (157/68 - 178/86)  BP(mean): --  RR: 18 (07-14-23 @ 06:01) (18 - 18)  SpO2: 100% (07-14-23 @ 06:01) (95% - 100%)    CVS: S1S2   Respiratory: No wheezing, no crepitations  GI: Abdomen soft, bowel sounds positive  Musculoskeletal:  moves all extremities  : Voiding        Chief complaint  Patient is a 73y old  Female who presents with a chief complaint of respiratory distress (13 Jul 2023 13:00)         Labs and Fingersticks  CAPILLARY BLOOD GLUCOSE      POCT Blood Glucose.: 198 mg/dL (14 Jul 2023 08:48)  POCT Blood Glucose.: 249 mg/dL (13 Jul 2023 21:49)  POCT Blood Glucose.: 183 mg/dL (13 Jul 2023 18:44)  POCT Blood Glucose.: 327 mg/dL (13 Jul 2023 11:37)      Anion Gap: 11 (07-12 @ 16:17)      Calcium: 8.9 (07-12 @ 16:17)  Albumin: 2.7 *L* (07-12 @ 16:17)    Alanine Aminotransferase (ALT/SGPT): 20 (07-12 @ 16:17)  Alkaline Phosphatase: 119 (07-12 @ 16:17)  Aspartate Aminotransferase (AST/SGOT): 28 (07-12 @ 16:17)        07-12    138  |  104  |  35<H>  ----------------------------<  120<H>  5.1   |  23  |  2.07<H>    Ca    8.9      12 Jul 2023 16:17  Mg     2.7     07-12    TPro  7.8  /  Alb  2.7<L>  /  TBili  0.4  /  DBili  x   /  AST  28  /  ALT  20  /  AlkPhos  119  07-12                        9.6    7.88  )-----------( 229      ( 12 Jul 2023 16:17 )             31.9     Medications  MEDICATIONS  (STANDING):  aspirin enteric coated 81 milliGRAM(s) Oral daily  atorvastatin 10 milliGRAM(s) Oral at bedtime  buDESOnide    Inhalation Suspension 0.5 milliGRAM(s) Inhalation every 12 hours  carvedilol 37.5 milliGRAM(s) Oral every 12 hours  cefepime   IVPB      cefepime   IVPB 1000 milliGRAM(s) IV Intermittent every 24 hours  cholecalciferol 2000 Unit(s) Oral daily  cloNIDine 0.1 milliGRAM(s) Oral two times a day  dextrose 5%. 1000 milliLiter(s) (100 mL/Hr) IV Continuous <Continuous>  dextrose 5%. 1000 milliLiter(s) (50 mL/Hr) IV Continuous <Continuous>  dextrose 50% Injectable 25 Gram(s) IV Push once  dextrose 50% Injectable 12.5 Gram(s) IV Push once  dextrose 50% Injectable 25 Gram(s) IV Push once  donepezil 10 milliGRAM(s) Oral at bedtime  doxazosin 8 milliGRAM(s) Oral at bedtime  furosemide   Injectable 40 milliGRAM(s) IV Push every 12 hours  glucagon  Injectable 1 milliGRAM(s) IntraMuscular once  insulin glargine Injectable (LANTUS) 18 Unit(s) SubCutaneous at bedtime  insulin lispro (ADMELOG) corrective regimen sliding scale   SubCutaneous three times a day before meals  insulin lispro Injectable (ADMELOG) 6 Unit(s) SubCutaneous three times a day before meals  letrozole 2.5 milliGRAM(s) Oral daily  Nephro-stacia 1 Tablet(s) Oral daily      Physical Exam  General: Patient comfortable in bed   Vital Signs Last 12 Hrs  T(F): 98.4 (07-14-23 @ 06:01), Max: 98.6 (07-14-23 @ 01:01)  HR: 77 (07-14-23 @ 06:01) (66 - 85)  BP: 157/68 (07-14-23 @ 06:01) (157/68 - 178/86)  BP(mean): --  RR: 18 (07-14-23 @ 06:01) (18 - 18)  SpO2: 100% (07-14-23 @ 06:01) (95% - 100%)    CVS: S1S2   Respiratory: No wheezing, no crepitations  GI: Abdomen soft, bowel sounds positive  Musculoskeletal:  moves all extremities  : Voiding

## 2023-07-14 NOTE — PATIENT PROFILE ADULT - FALL HARM RISK - HARM RISK INTERVENTIONS
Assistance with ambulation/Assistance OOB with selected safe patient handling equipment/Communicate Risk of Fall with Harm to all staff/Discuss with provider need for PT consult/Monitor gait and stability/Provide patient with walking aids - walker, cane, crutches/Reinforce activity limits and safety measures with patient and family/Sit up slowly, dangle for a short time, stand at bedside before walking/Tailored Fall Risk Interventions/Visual Cue: Yellow wristband and red socks/Bed in lowest position, wheels locked, appropriate side rails in place/Call bell, personal items and telephone in reach/Instruct patient to call for assistance before getting out of bed or chair/Non-slip footwear when patient is out of bed/[place]oward to call system/Physically safe environment - no spills, clutter or unnecessary equipment/Purposeful Proactive Rounding/Room/bathroom lighting operational, light cord in reach

## 2023-07-14 NOTE — PROGRESS NOTE ADULT - ASSESSMENT
72 y/o female with multiple medical problems whom i see in my practice on outptn basis. last home visit marichuy 15   Ptn has h/o DM, HTN, breast CA, respiratory arrest and cardiac arrest (2018), CVA with residual weakness, h/o trache, PEG, both removed, now eating on her own but aspirates and has post prandial cough in addition to chronic productive cough. presumed 2/2 silent aspiration of her saliva this was d/w Dr. Jose Almendarez who is in agreement    Today ptn presents to the ED with  complaining of worsening cough and orthopnea. ptn has chronic LE edema.   Denies any headache, fever, chills, chest pain, abdominal pain, n/v/d, dysuria. Unable to obtain any history or ROS due to mental status.    A/P  7/12: Aspiration PNA, cannot R/O BRYAN. check sputum culture, acute on chronic diastolic chf, , chronic aspiration  ptn has multiple ABx allergies, start Cipro and flagyl which she tolerated in the past  cont outptn meds, get S&S eval  insulin on a sliding scale, RUSS, check bladder sono, renal sono  card, renal, pulm, endo ID called  cont outptn meds  7/13: ptn is awake, alert, daughter at bedside. daughter mentioned [facility], wants me to cont seeing her on outptn at home and her but also wants to cont w [facility] on outptn. i explained to her she needs to pick a pcp, she cannot have me and [facility] in the hospital. she wants to stay with me, if her father changes his mind, she will let me know.   Ptn looks better today, more communicative, smiling, eating rice pudding, coughing after each bite. awaitng S&S, will need MBS. didnt tolerate FEEST in the past. ptn is hungry. she used to have a trache and a PEG post CVA, was removed few years ago. HTN is not controlled coreg raised to 37.5 bid. ptn seen by Renal, Scr at baseline, started on Farxiga and will start Losartan 25 mg    7/14: MBS done: recs are small soft bite food w mildly thickened liquids. renal, card, ID, pulm, endo input appreciated. Ptn is in isolation, being ruled out for active TB. 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 now on CEFEPIME, sputum for TB ordered. ptn is lethargic, BP improved, daughter feels its 2/2 CLONIDINE. as per renal start LOSARTAN. i started LOSARTAN yesterday, but it was DCed. lethargy could be due to overdiuresis. remains on LASIX 40 mg q12H. family wants the ptn to go home . awaiting ID clearance prior to DC. daughter c/o LE weakness and inability to ambulate, neuro called              dvt ppx w [facility]

## 2023-07-14 NOTE — SWALLOW VFSS/MBS ASSESSMENT ADULT - RECOMMENDED FEEDING/EATING TECHNIQUES
allow for swallow between intakes/alternate food with liquid/crush medication (when feasible)/hard swallow w/ each bite or sip/maintain upright posture during/after eating for 30 mins/oral hygiene/position upright (90 degrees)/provide rest periods between swallows/small sips/bites

## 2023-07-14 NOTE — PROGRESS NOTE ADULT - ASSESSMENT
73F admitted 7/12/23 for  with a PMH significant for respiratory distress in the setting of possible Aspiration pneumonia and CHF exacerbation.      Latent TB, In April 2017 was provided with INH - developed rash after 6 weeks and changed to other medication  (?ifampim) for additonal 3 months as per martinez  Breast Cancer 2/17  Aspiration 2/2 CVA  DM  CHF    Previously hospitalized   March 2018 with flu, resp arrest RUSS  MSSA bacteremia  - has PEA, trach and PEG  5/12 --> 5/22/18  Hospitalized with bradycardia  - has superficial abscess on back  I&D: no grwoth  Rx: Zosyn 5/13 --> 5/16/18 12/29/21 --> 1/4/22    was treated with CIPRO/Flagyl 12/30/21 --> 1/5/22  -   daughter states that she developed CDIFF after discharge     proBNP on arrival to ED was 8075 diuresed w/ Lasix; Cardiology and Pulm following  Today  patient is ill appearing with frequent cough  7/12/23 Chest CT notable for "Upper lung predominant bilateral multifocal consolidation with branching   tubular opacities, suggestive of endobronchial spread of infection.   Interval right apical thick-walled cyst since 2019. TB should be   considered in the appropriate clinical setting"  7/14 fatigued     Antibiotics  Cipro  7/13  Flagyl  7/13  Cefepime 7/13-->    SUGGEST  - Isolate Patient w/ Airborne precaution  Continue Cefepime 1g Q24  - Afb smear & Sputum Cx x3 3 consecutive days   - Obtain Blood Cx   - Obtain Sputum Cx  - Obtain HIV Test    monotherapy      discussed with  at bedside  Discussed with daughter by phone .

## 2023-07-14 NOTE — PATIENT PROFILE ADULT - DEAF OR HARD OF HEARING?
Patient is a 45y old  Male who presents with a chief complaint of "My leg wouldn't stop shaking." (14 Sep 2017 09:59)  doing ok  has somepain  difficulty expectorating phlegm     Any change in ROS:     MEDICATIONS  (STANDING):  ALBUTerol    90 MICROgram(s) HFA Inhaler 2 Puff(s) Inhalation every 6 hours  apixaban 5 milliGRAM(s) Oral every 12 hours  aspirin  chewable 81 milliGRAM(s) Oral daily  atorvastatin 80 milliGRAM(s) Oral at bedtime  chlorhexidine 4% Liquid 1 Application(s) Topical daily  diVALproex  milliGRAM(s) Oral two times a day  docusate sodium 100 milliGRAM(s) Oral two times a day  ferrous    sulfate 325 milliGRAM(s) Oral every 8 hours  folic acid 1 milliGRAM(s) Oral daily  ipratropium 17 MICROgram(s) HFA Inhaler 1 Puff(s) Inhalation every 6 hours  pantoprazole    Tablet 40 milliGRAM(s) Oral two times a day before meals  polyethylene glycol 3350 17 Gram(s) Oral at bedtime  voriconazole 200 milliGRAM(s) Oral every 12 hours    MEDICATIONS  (PRN):  acetaminophen    Suspension 650 milliGRAM(s) Oral every 6 hours PRN For Temp greater than 38 C (100.4 F)  acetaminophen   Tablet. 650 milliGRAM(s) Oral every 6 hours PRN Mild Pain (1 - 3)  acetaminophen   Tablet. 650 milliGRAM(s) Oral every 4 hours PRN Mild Pain (1 - 3)  aluminum hydroxide/magnesium hydroxide/simethicone Suspension 30 milliLiter(s) Oral every 6 hours PRN Dyspepsia  HYDROmorphone  Injectable 1 milliGRAM(s) IV Push every 4 hours PRN breakthrough pain  oxyCODONE    IR 5 milliGRAM(s) Oral every 4 hours PRN Moderate Pain (4 - 6)  oxyCODONE    IR 10 milliGRAM(s) Oral every 4 hours PRN Severe Pain (7 - 10)  traMADol 25 milliGRAM(s) Oral every 4 hours PRN Moderate Pain (4 - 6)    Vital Signs Last 24 Hrs  T(C): 36.8 (06 Oct 2017 11:19), Max: 37 (06 Oct 2017 08:00)  T(F): 98.3 (06 Oct 2017 11:19), Max: 98.6 (06 Oct 2017 08:00)  HR: 91 (06 Oct 2017 11:19) (80 - 96)  BP: 104/51 (06 Oct 2017 11:19) (102/62 - 108/64)  BP(mean): 78 (05 Oct 2017 17:00) (78 - 78)  RR: 18 (06 Oct 2017 05:44) (18 - 20)  SpO2: 100% (06 Oct 2017 11:19) (95% - 100%)    I&O's Summary    05 Oct 2017 07:01  -  06 Oct 2017 07:00  --------------------------------------------------------  IN: 360 mL / OUT: 1035 mL / NET: -675 mL          Physical Exam:   GENERAL: NAD, well-groomed, well-developed  HEENT: GIOVANI/   Atraumatic, Normocephalic  ENMT: No tonsillar erythema, exudates, or enlargement; Moist mucous membranes, Good dentition, No lesions  NECK: Supple, No JVD, Normal thyroid  CHEST/LUNG: decreased air entry bilaterally   CVS: Regular rate and rhythm; No murmurs, rubs, or gallops  GI: : Soft, Nontender, Nondistended; Bowel sounds present  NERVOUS SYSTEM:  Alert & Oriented X3  EXTREMITIES:  2+ Peripheral Pulses, No clubbing, cyanosis, or edema  LYMPH: No lymphadenopathy noted  SKIN: No rashes or lesions  ENDOCRINOLOGY: No Thyromegaly  PSYCH: Appropriate    Labs:                              9.0    8.47  )-----------( 176      ( 05 Oct 2017 06:32 )             28.6                         8.7    11.31 )-----------( 194      ( 04 Oct 2017 02:50 )             27.5                         8.4    11.49 )-----------( 175      ( 03 Oct 2017 03:40 )             26.3     10-05    143  |  102  |  15  ----------------------------<  86  3.9   |  36<H>  |  0.92  10-04    143  |  98  |  15  ----------------------------<  107<H>  3.9   |  34<H>  |  0.88  10-03    144  |  101  |  15  ----------------------------<  93  4.0   |  35<H>  |  0.90    Ca    8.3<L>      05 Oct 2017 06:32    TPro  5.6<L>  /  Alb  3.0<L>  /  TBili  0.3  /  DBili  x   /  AST  30  /  ALT  25  /  AlkPhos  46  10-05  TPro  5.4<L>  /  Alb  2.9<L>  /  TBili  0.3  /  DBili  x   /  AST  42<H>  /  ALT  27  /  AlkPhos  39<L>  10-03    CAPILLARY BLOOD GLUCOSE          LIVER FUNCTIONS - ( 05 Oct 2017 06:32 )  Alb: 3.0 g/dL / Pro: 5.6 g/dL / ALK PHOS: 46 u/L / ALT: 25 u/L / AST: 30 u/L / GGT: x           PT/INR - ( 06 Oct 2017 14:15 )   PT: 14.4 SEC;   INR: 1.28          PTT - ( 06 Oct 2017 14:15 )  PTT:28.5 SEC    Cultures:         < from: Xray Chest 1 View AP- PORTABLE-Urgent (10.04.17 @ 16:02) >  l lung opacities are unchanged.    AP portable chest x-ray from October 5, 2017 at 6:34 AM:    There is no significant interval change.              IMPRESSION:  Left chest tube unchanged in position and left apical   pneumothorax is unchanged.    Subcutaneous emphysema, greater on the left again noted.    Unchanged bilateral lung opacities and small pleural effusions.                  KEERTHI FIELDS M.D., ATTENDING RADIOLOGIST  This document has been electronically signed. Oct  5 2017  8:37AM    < end of copied text >                      Studies  Chest X-RAY  CT SCAN Chest   Venous Dopplers: LE:   CT Abdomen  Others no

## 2023-07-15 NOTE — PROGRESS NOTE ADULT - ASSESSMENT
73F female h/o DM, HTN, breast CA, respiratory arrest and cardiac arrest (2018), CVA with residual weakness, h/o trache, PEG, being worked up for asp PNA.   Assessment  DMT2: 73y Female with DM T2 with hyperglycemia, A1C pending, was on oral meds and MIXED insulin at home, now on basal bolus insulin with coverage, blood sugars running high, insulin adjusted.  Asp PNA: ?chronic aspiration, sputum cultures. Aspiration precautions.   HTN: on antihypertensive medications, monitored, asymptomatic.  CKD: Monitor labs/BMP, renal dosing.     Discussed plan and management wit Dr Naresh Bales NP - TEAMS  Viraj Infante MD  Cell: [phone]  Office: [phone]             73F female h/o DM, HTN, breast CA, respiratory arrest and cardiac arrest (2018), CVA with residual weakness, h/o trache, PEG, being worked up for asp PNA.   Assessment  DMT2: 73y Female with DM T2 with hyperglycemia, A1C pending, was on oral meds and MIXED insulin at home, now on basal bolus insulin with coverage, blood sugars  running high, insulin adjusted.  Asp PNA: ?chronic aspiration, sputum cultures. Aspiration precautions.   HTN: on antihypertensive medications, monitored, asymptomatic.  CKD: Monitor labs/BMP, renal dosing.     Discussed plan and management wit Dr Naresh Bales NP - TEAMS  Viraj Infante MD  Cell: [phone]  Office: [phone]

## 2023-07-15 NOTE — CONSULT NOTE ADULT - SUBJECTIVE AND OBJECTIVE BOX
Neurology Consult    Reason for Consult: Patient is a 73y old  Female who presents with a chief complaint of respiratory distress (14 Jul 2023 20:13)      HPI:  74 y/o female with multiple medical problems whom i see in my practice on outptn basis. last home visit marichuy 15   Ptn has h/o DM, HTN, breast CA, respiratory arrest and cardiac arrest (2018), CVA with residual weakness, h/o trache, PEG, both removed, now eating on her own but aspirates and has post prandial cough in addition to chronic productive cough. presumed 2/2 silent aspiration of her saliva this was d/w Dr. Jose Almendarez who is in agreement    Today ptn presents to the ED with  complaining of worsening cough and orthopnea. ptn has chronic LE edema.   Denies any headache, fever, chills, chest pain, abdominal pain, n/v/d, dysuria. Unable to obtain any history or ROS due to mental status.   (12 Jul 2023 19:28)       PAST MEDICAL & SURGICAL HISTORY:  Breast CA      Diabetes      Stroke      Cardiac arrest      HTN (hypertension)      H/O mastectomy, bilateral          Allergies: Allergies    hydrALAZINE (Rash)  vitamin E (Short breath; Urticaria; Hives)  NIFEdipine (Urticaria; Hives)  doxycycline (Rash)  nafcillin (Unknown)  isoniazid (Rash)    Intolerances        Social History: Denies toxic habits including tobacco, ETOH or illicit drugs.    Family History: FAMILY HISTORY:  No pertinent family history in first degree relatives    . No family history of strokes    Medications: MEDICATIONS  (STANDING):  aspirin enteric coated 81 milliGRAM(s) Oral daily  atorvastatin 10 milliGRAM(s) Oral at bedtime  buDESOnide    Inhalation Suspension 0.5 milliGRAM(s) Inhalation every 12 hours  carvedilol 37.5 milliGRAM(s) Oral every 12 hours  cefepime   IVPB 1000 milliGRAM(s) IV Intermittent every 24 hours  cefepime   IVPB      chlorhexidine 2% Cloths 1 Application(s) Topical <User Schedule>  cholecalciferol 2000 Unit(s) Oral daily  cloNIDine 0.1 milliGRAM(s) Oral two times a day  dextrose 5%. 1000 milliLiter(s) (50 mL/Hr) IV Continuous <Continuous>  dextrose 5%. 1000 milliLiter(s) (100 mL/Hr) IV Continuous <Continuous>  dextrose 50% Injectable 25 Gram(s) IV Push once  dextrose 50% Injectable 25 Gram(s) IV Push once  dextrose 50% Injectable 12.5 Gram(s) IV Push once  donepezil 10 milliGRAM(s) Oral at bedtime  doxazosin 8 milliGRAM(s) Oral at bedtime  furosemide   Injectable 40 milliGRAM(s) IV Push every 12 hours  glucagon  Injectable 1 milliGRAM(s) IntraMuscular once  insulin glargine Injectable (LANTUS) 18 Unit(s) SubCutaneous at bedtime  insulin lispro (ADMELOG) corrective regimen sliding scale   SubCutaneous three times a day before meals  insulin lispro Injectable (ADMELOG) 6 Unit(s) SubCutaneous three times a day before meals  letrozole 2.5 milliGRAM(s) Oral daily  Nephro-stacia 1 Tablet(s) Oral daily  polyethylene glycol 3350 17 Gram(s) Oral two times a day    MEDICATIONS  (PRN):  dextrose Oral Gel 15 Gram(s) Oral once PRN Blood Glucose LESS THAN 70 milliGRAM(s)/deciliter  guaiFENesin Oral Liquid (Sugar-Free) 100 milliGRAM(s) Oral every 6 hours PRN Cough      Review of Systems:  CONSTITUTIONAL:    weakness   HEENT:  Eyes:  No visual loss, blurred vision, double vision or yellow sclera. Ears, Nose, Throat:  No hearing loss, sneezing, congestion, runny nose or sore throat.  SKIN:  No rash or itching.  CARDIOVASCULAR:  No chest pain, chest pressure or chest discomfort. No palpitations or edema.  RESPIRATORY:  shortness of breath and  cough no sputum.  GASTROINTESTINAL:  No anorexia, nausea, vomiting or diarrhea. No abdominal pain or blood. PEG reversed   GENITOURINARY:  No burning on urination or incontinence   NEUROLOGICAL:  No headache, dizziness, syncope, paralysis, ataxia, numbness or tingling in the extremities. No change in bowel or bladder control. no limb weakness. no vision changes. prior stroke   MUSCULOSKELETAL:  No muscle, back pain, joint pain or stiffness.  HEMATOLOGIC:  No anemia, bleeding or bruising.  LYMPHATICS:  No enlarged nodes. No history of splenectomy.  PSYCHIATRIC:  No history of depression or anxiety.  ENDOCRINOLOGIC:  No reports of sweating, cold or heat intolerance. No polyuria or polydipsia.      Vitals:  Vital Signs Last 24 Hrs  T(C): 37.6 (15 Jul 2023 04:54), Max: 37.6 (15 Jul 2023 04:54)  T(F): 99.7 (15 Jul 2023 04:54), Max: 99.7 (15 Jul 2023 04:54)  HR: 83 (15 Jul 2023 04:54) (66 - 83)  BP: 146/72 (15 Jul 2023 04:54) (146/72 - 161/78)  BP(mean): --  RR: 18 (15 Jul 2023 04:54) (18 - 18)  SpO2: 99% (15 Jul 2023 04:54) (98% - 100%)    Parameters below as of 15 Jul 2023 04:54  Patient On (Oxygen Delivery Method): nasal cannula  O2 Flow (L/min): 3      General Exam:   General Appearance: Appropriately dressed and in no acute distress       Head: Normocephalic, atraumatic and no dysmorphic features  Ear, Nose, and Throat: Moist mucous membranes  CVS: S1S2+  Resp: No SOB, no wheeze or rhonchi  GI: soft NT/ND  Extremities: No edema or cyanosis  Skin: No bruises or rashes     Neurological Exam:  Mental Status: Awake, alert and oriented x 1-2.  Able to follow simple  verbal commands. Able to name and repeat. fluent speech. No obvious aphasia mild dysarthria noted.   Cranial Nerves: PERRL, EOMI, R HHA, sensation V1-V3 intact,  mild facial asymmetry, equal elevation of palate, scm/trap 5/5, tongue is midline on protrusion. no obvious papilledema on fundoscopic exam. hearing is grossly intact.   Motor: uppers 3-4/5, lowers 2-3/5 question of asterixes   Sensation: Intact to light touch and pinprick throughout. no right/left confusion. no extinction to tactile on DSS.    Reflexes: 1+ throughout at biceps, brachioradialis, triceps, patellars and ankles bilaterally and equal. No clonus. R toe and L toe were both downgoing.  Coordination: No dysmetria on FNF  unable in lowers   Gait: wheelchair bound     Data/Labs/Imaging which I personally reviewed.     Labs:     CBC Full  -  ( 14 Jul 2023 11:18 )  WBC Count : 6.31 K/uL  RBC Count : 3.09 M/uL  Hemoglobin : 9.1 g/dL  Hematocrit : 30.2 %  Platelet Count - Automated : 197 K/uL  Mean Cell Volume : 97.7 fl  Mean Cell Hemoglobin : 29.4 pg  Mean Cell Hemoglobin Concentration : 30.1 gm/dL  Auto Neutrophil # : x  Auto Lymphocyte # : x  Auto Monocyte # : x  Auto Eosinophil # : x  Auto Basophil # : x  Auto Neutrophil % : x  Auto Lymphocyte % : x  Auto Monocyte % : x  Auto Eosinophil % : x  Auto Basophil % : x    07-14    141  |  100  |  39<H>  ----------------------------<  204<H>  4.4   |  29  |  2.24<H>    Ca    8.7      14 Jul 2023 11:18          Urinalysis Basic - ( 14 Jul 2023 11:18 )    Color: x / Appearance: x / SG: x / pH: x  Gluc: 204 mg/dL / Ketone: x  / Bili: x / Urobili: x   Blood: x / Protein: x / Nitrite: x   Leuk Esterase: x / RBC: x / WBC x   Sq Epi: x / Non Sq Epi: x / Bacteria: x

## 2023-07-15 NOTE — CHART NOTE - NSCHARTNOTEFT_GEN_A_CORE
MEDICINE NP-EPISODIC NOTE    Notified by RN pt with rash. Seen patient with daughter at bedside, patient with hives to BLE, pannus, and neck.  Denies itching. VSS. Patient with hx of drug allergies that manifest as hives as per daughter and currently on cefepime. Daughter also concerned pt more sleepy then usual,  metabolic encephelopathy vs cefepime-induced and pt with left knee pain, xray ordered. Paged ID for alternate antibiotic rec. F/U primary team in AM.    [id number] MEDICINE NP-EPISODIC NOTE    Notified by RN pt with rash. Seen patient with daughter at bedside, patient with hives to BLE, pannus, and neck.  Denies itching. VSS. Patient with hx of drug allergies that manifest as hives as per daughter and currently on cefepime. Daughter also concerned pt more sleepy then usual ?cefepime-induced vs metabolic encephalopathy vs overdiuresis, and pt with left knee pain, xray ordered. Paged ID for alternate antibiotic rec. F/U primary team in AM.    [id number]

## 2023-07-15 NOTE — CHART NOTE - NSCHARTNOTEFT_GEN_A_CORE
Provider alerted by RN to rectal temp 100.2 and lethargy. Provider examined patient at bedside. Patient declines chills or diaphoresis at this time.       Vital Signs Last 24 Hrs  T(C): 36.4 (15 Jul 2023 10:47), Max: 37.9 (15 Jul 2023 09:23)  T(F): 97.6 (15 Jul 2023 10:47), Max: 100.2 (15 Jul 2023 09:23)  HR: 83 (15 Jul 2023 04:54) (66 - 83)  BP: 146/72 (15 Jul 2023 04:54) (146/72 - 161/78)  BP(mean): --  RR: 18 (15 Jul 2023 04:54) (18 - 18)  SpO2: 99% (15 Jul 2023 04:54) (98% - 100%)    Parameters below as of 15 Jul 2023 04:54  Patient On (Oxygen Delivery Method): nasal cannula  O2 Flow (L/min): 3      Physical Exam:  General: WN/WD NAD  Neurology: A&Ox3, nonfocal, CENTENO x 4, able to appropriately follow commands  Head:  Normocephalic, atraumatic  Respiratory: CTA B/L, no wheezing  CV: RRR, S1S2, no murmur  Abdominal: Soft, NT, ND no palpable mass  MSK: No edema, + peripheral pulses, FROM all 4 extremity  Labs:                          8.9    7.07  )-----------( 192      ( 15 Jul 2023 09:53 )             29.7     07-15    140  |  100  |  37<H>  ----------------------------<  224<H>  4.6   |  30  |  2.29<H>    Ca    8.4      15 Jul 2023 09:53  Phos  7.2     07-15  Mg     2.3     07-15    Radiology:  CT Chest No Cont 7/12 IMPRESSION:  Upper lung predominant bilateral multifocal consolidation with branching   tubular opacities, suggestive of endobronchial spread of infection.   Interval right apical thick-walled cyst since 2019. TB should be   considered in the appropriate clinical setting, among other infectious   etiologies. Small right pleural effusion.    HPI:  74 y/o female PMHx latent TB improperly treated currently being worked up for active TB presents with rectal temp 100.2.     Assessment & Plan:  > IV Tylenol conversion for fever  > Neuro consulted on patient in AM  > Blood cultures drawn today with AM labs  > Continue to follow AFB  > Continue abx as per ID. Continue to follow ID/Pulm recommendations  > Encouraged patient to cough up congestion/secretions  > Dr. Brady notified    Vira Belcher PA-C  Dept of Medicine

## 2023-07-15 NOTE — CONSULT NOTE ADULT - TIME BILLING
-review of the history and records  -general and neurological examination  -generation of assessment and treatment plan  -communication with the patient/family members  -coordination of care.
Advanced care planning was discussed with patient and family.  Advanced care planning forms were reviewed and discussed as appropriate.  Differential diagnosis and plan of care discussed with patient after the evaluation.   Pain assessed and judicious use of narcotics when appropriate was discussed.  Importance of Fall prevention discussed.  Counseling on Smoking and Alcohol cessation was offered when appropriate.  Counseling on Diet, exercise, and medication compliance was done.

## 2023-07-15 NOTE — PROGRESS NOTE ADULT - ASSESSMENT
72 y/o female with multiple medical problems whom i see in my practice on outptn basis. last home visit marichuy 15   Ptn has h/o DM, HTN, breast CA, respiratory arrest and cardiac arrest (2018), CVA with residual weakness, h/o trache, PEG, both removed, now eating on her own but aspirates and has post prandial cough in addition to chronic productive cough. presumed 2/2 silent aspiration of her saliva this was d/w Dr. Jose Almendarez who is in agreement    Today ptn presents to the ED with  complaining of worsening cough and orthopnea. ptn has chronic LE edema.   Denies any headache, fever, chills, chest pain, abdominal pain, n/v/d, dysuria. Unable to obtain any history or ROS due to mental status.    A/P  7/12: Aspiration PNA, cannot R/O BRYAN. check sputum culture, acute on chronic diastolic chf, , chronic aspiration  ptn has multiple ABx allergies, start Cipro and flagyl which she tolerated in the past  cont outptn meds, get S&S eval  insulin on a sliding scale, RUSS, check bladder sono, renal sono  card, renal, pulm, endo ID called  cont outptn meds  7/13: ptn is awake, alert, daughter at bedside. daughter mentioned [facility], wants me to cont seeing her on outptn at home and her but also wants to cont w [facility] on outptn. i explained to her she needs to pick a pcp, she cannot have me and [facility] in the hospital. she wants to stay with me, if her father changes his mind, she will let me know.   Ptn looks better today, more communicative, smiling, eating rice pudding, coughing after each bite. awaitng S&S, will need MBS. didnt tolerate FEEST in the past. ptn is hungry. she used to have a trache and a PEG post CVA, was removed few years ago. HTN is not controlled coreg raised to 37.5 bid. ptn seen by Renal, Scr at baseline, started on Farxiga and will start Losartan 25 mg    7/14: MBS done: recs are small soft bite food w mildly thickened liquids. renal, card, ID, pulm, endo input appreciated. Ptn is in isolation, being ruled out for active TB. 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 now on CEFEPIME, sputum for TB ordered. ptn is lethargic, BP improved, daughter feels its 2/2 CLONIDINE. as per renal start LOSARTAN. i started LOSARTAN yesterday, but it was DCed. lethargy could be due to overdiuresis. remains on LASIX 40 mg q12H. family wants the ptn to go home . awaiting ID clearance prior to DC. daughter c/o LE weakness and inability to ambulate, neuro called    7/15: ptn is on ABx for PNA, being worked up for TB, seen by Neuro. no immediate interventions planned. cont present meds              dvt ppx w HSC

## 2023-07-15 NOTE — PROGRESS NOTE ADULT - SUBJECTIVE AND OBJECTIVE BOX
Chief complaint  Patient is a 73y old  Female who presents with a chief complaint of respiratory distress (15 Jul 2023 09:47)         Labs and Fingersticks  CAPILLARY BLOOD GLUCOSE      POCT Blood Glucose.: 246 mg/dL (15 Jul 2023 08:12)  POCT Blood Glucose.: 246 mg/dL (14 Jul 2023 21:10)  POCT Blood Glucose.: 239 mg/dL (14 Jul 2023 16:54)  POCT Blood Glucose.: 115 mg/dL (14 Jul 2023 13:02)      Anion Gap: 10 (07-15 @ 09:53)  Anion Gap: 12 (07-14 @ 11:18)      Calcium: 8.4 (07-15 @ 09:53)  Calcium: 8.7 (07-14 @ 11:18)          07-15    140  |  100  |  37<H>  ----------------------------<  224<H>  4.6   |  30  |  2.29<H>    Ca    8.4      15 Jul 2023 09:53  Phos  7.2     07-15  Mg     2.3     07-15                          8.9    7.07  )-----------( 192      ( 15 Jul 2023 09:53 )             29.7     Medications  MEDICATIONS  (STANDING):  aspirin enteric coated 81 milliGRAM(s) Oral daily  atorvastatin 10 milliGRAM(s) Oral at bedtime  buDESOnide    Inhalation Suspension 0.5 milliGRAM(s) Inhalation every 12 hours  carvedilol 37.5 milliGRAM(s) Oral every 12 hours  cefepime   IVPB 1000 milliGRAM(s) IV Intermittent every 24 hours  cefepime   IVPB      chlorhexidine 2% Cloths 1 Application(s) Topical <User Schedule>  cholecalciferol 2000 Unit(s) Oral daily  cloNIDine 0.1 milliGRAM(s) Oral two times a day  dextrose 5%. 1000 milliLiter(s) (100 mL/Hr) IV Continuous <Continuous>  dextrose 5%. 1000 milliLiter(s) (50 mL/Hr) IV Continuous <Continuous>  dextrose 50% Injectable 25 Gram(s) IV Push once  dextrose 50% Injectable 12.5 Gram(s) IV Push once  dextrose 50% Injectable 25 Gram(s) IV Push once  donepezil 10 milliGRAM(s) Oral at bedtime  doxazosin 8 milliGRAM(s) Oral at bedtime  furosemide   Injectable 40 milliGRAM(s) IV Push every 12 hours  glucagon  Injectable 1 milliGRAM(s) IntraMuscular once  insulin glargine Injectable (LANTUS) 18 Unit(s) SubCutaneous at bedtime  insulin lispro (ADMELOG) corrective regimen sliding scale   SubCutaneous three times a day before meals  insulin lispro Injectable (ADMELOG) 7 Unit(s) SubCutaneous three times a day before meals  letrozole 2.5 milliGRAM(s) Oral daily  Nephro-stacia 1 Tablet(s) Oral daily  polyethylene glycol 3350 17 Gram(s) Oral two times a day      Physical Exam  General: Patient comfortable in bed  Vital Signs Last 12 Hrs  T(F): 97.6 (07-15-23 @ 10:47), Max: 100.2 (07-15-23 @ 09:23)  HR: 83 (07-15-23 @ 04:54) (83 - 83)  BP: 146/72 (07-15-23 @ 04:54) (146/72 - 146/72)  BP(mean): --  RR: 18 (07-15-23 @ 04:54) (18 - 18)  SpO2: 99% (07-15-23 @ 04:54) (99% - 99%)    CVS: S1S2   Respiratory: No wheezing, no crepitations  GI: Abdomen soft, bowel sounds positive  Musculoskeletal:  moves all extremities  : Voiding          Chief complaint  Patient is a 73y old  Female who presents with a chief complaint of respiratory distress (15 Jul 2023 09:47)       Labs and Fingersticks  CAPILLARY BLOOD GLUCOSE      POCT Blood Glucose.: 246 mg/dL (15 Jul 2023 08:12)  POCT Blood Glucose.: 246 mg/dL (14 Jul 2023 21:10)  POCT Blood Glucose.: 239 mg/dL (14 Jul 2023 16:54)  POCT Blood Glucose.: 115 mg/dL (14 Jul 2023 13:02)      Anion Gap: 10 (07-15 @ 09:53)  Anion Gap: 12 (07-14 @ 11:18)      Calcium: 8.4 (07-15 @ 09:53)  Calcium: 8.7 (07-14 @ 11:18)          07-15    140  |  100  |  37<H>  ----------------------------<  224<H>  4.6   |  30  |  2.29<H>    Ca    8.4      15 Jul 2023 09:53  Phos  7.2     07-15  Mg     2.3     07-15                          8.9    7.07  )-----------( 192      ( 15 Jul 2023 09:53 )             29.7     Medications  MEDICATIONS  (STANDING):  aspirin enteric coated 81 milliGRAM(s) Oral daily  atorvastatin 10 milliGRAM(s) Oral at bedtime  buDESOnide    Inhalation Suspension 0.5 milliGRAM(s) Inhalation every 12 hours  carvedilol 37.5 milliGRAM(s) Oral every 12 hours  cefepime   IVPB 1000 milliGRAM(s) IV Intermittent every 24 hours  cefepime   IVPB      chlorhexidine 2% Cloths 1 Application(s) Topical <User Schedule>  cholecalciferol 2000 Unit(s) Oral daily  cloNIDine 0.1 milliGRAM(s) Oral two times a day  dextrose 5%. 1000 milliLiter(s) (100 mL/Hr) IV Continuous <Continuous>  dextrose 5%. 1000 milliLiter(s) (50 mL/Hr) IV Continuous <Continuous>  dextrose 50% Injectable 25 Gram(s) IV Push once  dextrose 50% Injectable 12.5 Gram(s) IV Push once  dextrose 50% Injectable 25 Gram(s) IV Push once  donepezil 10 milliGRAM(s) Oral at bedtime  doxazosin 8 milliGRAM(s) Oral at bedtime  furosemide   Injectable 40 milliGRAM(s) IV Push every 12 hours  glucagon  Injectable 1 milliGRAM(s) IntraMuscular once  insulin glargine Injectable (LANTUS) 18 Unit(s) SubCutaneous at bedtime  insulin lispro (ADMELOG) corrective regimen sliding scale   SubCutaneous three times a day before meals  insulin lispro Injectable (ADMELOG) 7 Unit(s) SubCutaneous three times a day before meals  letrozole 2.5 milliGRAM(s) Oral daily  Nephro-stacia 1 Tablet(s) Oral daily  polyethylene glycol 3350 17 Gram(s) Oral two times a day      Physical Exam  General: Patient comfortable in bed  Vital Signs Last 12 Hrs  T(F): 97.6 (07-15-23 @ 10:47), Max: 100.2 (07-15-23 @ 09:23)  HR: 83 (07-15-23 @ 04:54) (83 - 83)  BP: 146/72 (07-15-23 @ 04:54) (146/72 - 146/72)  BP(mean): --  RR: 18 (07-15-23 @ 04:54) (18 - 18)  SpO2: 99% (07-15-23 @ 04:54) (99% - 99%)    CVS: S1S2   Respiratory: No wheezing, no crepitations  GI: Abdomen soft, bowel sounds positive  Musculoskeletal:  moves all extremities  : Voiding

## 2023-07-15 NOTE — PROGRESS NOTE ADULT - SUBJECTIVE AND OBJECTIVE BOX
Subjective: Patient seen and examined. No new events except as noted.   Febrile     REVIEW OF SYSTEMS:    CONSTITUTIONAL: + weakness, fevers or chills  EYES/ENT: No visual changes;  No vertigo or throat pain   NECK: No pain or stiffness  RESPIRATORY: No cough, wheezing, hemoptysis; No shortness of breath  CARDIOVASCULAR: No chest pain or palpitations  GASTROINTESTINAL: No abdominal or epigastric pain. No nausea, vomiting, or hematemesis; No diarrhea or constipation. No melena or hematochezia.  GENITOURINARY: No dysuria, frequency or hematuria  NEUROLOGICAL: No numbness or weakness  SKIN: No itching, burning, rashes, or lesions   All other review of systems is negative unless indicated above.    MEDICATIONS:  MEDICATIONS  (STANDING):  albuterol/ipratropium for Nebulization 3 milliLiter(s) Nebulizer every 6 hours  aspirin enteric coated 81 milliGRAM(s) Oral daily  atorvastatin 10 milliGRAM(s) Oral at bedtime  buDESOnide    Inhalation Suspension 0.5 milliGRAM(s) Inhalation every 12 hours  carvedilol 37.5 milliGRAM(s) Oral every 12 hours  chlorhexidine 2% Cloths 1 Application(s) Topical <User Schedule>  cholecalciferol 2000 Unit(s) Oral daily  cloNIDine 0.1 milliGRAM(s) Oral two times a day  dextrose 5%. 1000 milliLiter(s) (100 mL/Hr) IV Continuous <Continuous>  dextrose 5%. 1000 milliLiter(s) (50 mL/Hr) IV Continuous <Continuous>  dextrose 50% Injectable 25 Gram(s) IV Push once  dextrose 50% Injectable 25 Gram(s) IV Push once  dextrose 50% Injectable 12.5 Gram(s) IV Push once  donepezil 10 milliGRAM(s) Oral at bedtime  doxazosin 8 milliGRAM(s) Oral at bedtime  fluticasone propionate 50 MICROgram(s)/spray Nasal Spray 1 Spray(s) Both Nostrils two times a day  furosemide   Injectable 40 milliGRAM(s) IV Push every 12 hours  glucagon  Injectable 1 milliGRAM(s) IntraMuscular once  hydrocortisone 1% Cream 1 Application(s) Topical two times a day  insulin glargine Injectable (LANTUS) 18 Unit(s) SubCutaneous at bedtime  insulin lispro (ADMELOG) corrective regimen sliding scale   SubCutaneous three times a day before meals  insulin lispro Injectable (ADMELOG) 7 Unit(s) SubCutaneous three times a day before meals  letrozole 2.5 milliGRAM(s) Oral daily  Nephro-stacia 1 Tablet(s) Oral daily  polyethylene glycol 3350 17 Gram(s) Oral two times a day  sodium chloride 0.9% for Nebulization 3 milliLiter(s) Nebulizer every 6 hours      PHYSICAL EXAM:  T(C): 36.6 (07-15-23 @ 20:00), Max: 37.9 (07-15-23 @ 09:23)  HR: 65 (07-15-23 @ 20:00) (65 - 83)  BP: 166/79 (07-15-23 @ 20:00) (128/71 - 166/79)  RR: 18 (07-15-23 @ 20:00) (18 - 18)  SpO2: 100% (07-15-23 @ 20:00) (99% - 100%)  Wt(kg): --  I&O's Summary    14 Jul 2023 07:01  -  15 Jul 2023 07:00  --------------------------------------------------------  IN: 580 mL / OUT: 750 mL / NET: -170 mL    15 Jul 2023 07:01  -  15 Jul 2023 23:30  --------------------------------------------------------  IN: 0 mL / OUT: 800 mL / NET: -800 mL            Appearance: NAD 3 liters NC   HEENT:   Normal oral mucosa, PERRL, EOMI	  Lymphatic: No lymphadenopathy  Cardiovascular: Normal S1 S2, No JVD, No murmurs, No edema  Respiratory: decreased bs   Psychiatry: A & O x 3, Mood & affect appropriate  Gastrointestinal:  Soft, Non-tender, + BS	  Skin: No rashes, No ecchymoses, No cyanosis	  Neurologic: Non-focal  Extremities: Normal range of motion, No clubbing, cyanosis or edema  Vascular: Peripheral pulses palpable 2+ bilaterally        LABS:    CARDIAC MARKERS:                                8.9    7.07  )-----------( 192      ( 15 Jul 2023 09:53 )             29.7     07-15    140  |  100  |  37<H>  ----------------------------<  224<H>  4.6   |  30  |  2.29<H>    Ca    8.4      15 Jul 2023 09:53  Phos  7.2     07-15  Mg     2.3     07-15        TELEMETRY: 	  SR  ECG:  	  RADIOLOGY:   DIAGNOSTIC TESTING:  [ ] Echocardiogram:  [ ]  Catheterization:  [ ] Stress Test:    OTHER:

## 2023-07-15 NOTE — CONSULT NOTE ADULT - ASSESSMENT
74 y/o female with DM, HTN, breast CA, respiratory arrest and cardiac arrest (2018), L PCA stroke 2018 s/p ILR, h/o trache, PEG, both removed, now eating on her own but aspirates and has post prandial cough in addition to chronic productive cough with c/f silent aspiration.      was walking with walker until ~Oct 2022 per daughter now wheelchair bound.   spoke iwth daughter at bedside who states for stroke she follows with house neurology Dr. Ramiro Garner and Renae Vargas for stroke as well as movement dr. Lance Vizcaino.   MRI brain 2018 B/L centrum semi ovale watershed infarcts   o/e AAOx1-2, mild dysarthira, RHHA follows simple commands, uppers 3-4/5 lowers 2-3/5, question of asterixes in uppers  states had MRI at 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 now on cefepime   - for dementia she is on donepezil 10mg PO QHS  - can check b12, RPR, TSH for reversible causes of dementia   - on airborne precuations to r/o TB    - Hemoglobin A1c and lipid panel  - telemetry  - PT/OT/SS/SLP, OOBC  - check FS, glucose control <180  - GI/DVT ppx  - Differential diagnosis and plan of care discussed with patient and/or family and primary team  - Thank you for allowing me to participate in the care of this patient. Call with questions.   - spoke with daughter at bedside , all questions answered    follows with house neurology Dr. Ramiro Garner and Renae Vargas for stroke as well as movement dr. Lance Vizcaino.   Anoop Bianchi MD  Vascular Neurology  Office: [phone]

## 2023-07-15 NOTE — PROGRESS NOTE ADULT - SUBJECTIVE AND OBJECTIVE BOX
Patient is a 73y old  Female who presents with a chief complaint of respiratory distress (15 Jul 2023 12:32)      SUBJECTIVE / OVERNIGHT EVENTS: no new events, being worked up for TB    MEDICATIONS  (STANDING):  albuterol/ipratropium for Nebulization 3 milliLiter(s) Nebulizer every 6 hours  aspirin enteric coated 81 milliGRAM(s) Oral daily  atorvastatin 10 milliGRAM(s) Oral at bedtime  buDESOnide    Inhalation Suspension 0.5 milliGRAM(s) Inhalation every 12 hours  carvedilol 37.5 milliGRAM(s) Oral every 12 hours  cefepime   IVPB      cefepime   IVPB 1000 milliGRAM(s) IV Intermittent every 24 hours  chlorhexidine 2% Cloths 1 Application(s) Topical <User Schedule>  cholecalciferol 2000 Unit(s) Oral daily  cloNIDine 0.1 milliGRAM(s) Oral two times a day  dextrose 5%. 1000 milliLiter(s) (100 mL/Hr) IV Continuous <Continuous>  dextrose 5%. 1000 milliLiter(s) (50 mL/Hr) IV Continuous <Continuous>  dextrose 50% Injectable 25 Gram(s) IV Push once  dextrose 50% Injectable 12.5 Gram(s) IV Push once  dextrose 50% Injectable 25 Gram(s) IV Push once  donepezil 10 milliGRAM(s) Oral at bedtime  doxazosin 8 milliGRAM(s) Oral at bedtime  fluticasone propionate 50 MICROgram(s)/spray Nasal Spray 1 Spray(s) Both Nostrils two times a day  furosemide   Injectable 40 milliGRAM(s) IV Push every 12 hours  glucagon  Injectable 1 milliGRAM(s) IntraMuscular once  hydrocortisone 1% Cream 1 Application(s) Topical two times a day  insulin glargine Injectable (LANTUS) 18 Unit(s) SubCutaneous at bedtime  insulin lispro (ADMELOG) corrective regimen sliding scale   SubCutaneous three times a day before meals  insulin lispro Injectable (ADMELOG) 7 Unit(s) SubCutaneous three times a day before meals  letrozole 2.5 milliGRAM(s) Oral daily  Nephro-stacia 1 Tablet(s) Oral daily  polyethylene glycol 3350 17 Gram(s) Oral two times a day  sodium chloride 0.9% for Nebulization 3 milliLiter(s) Nebulizer every 6 hours    MEDICATIONS  (PRN):  acetaminophen     Tablet .. 650 milliGRAM(s) Oral every 6 hours PRN Temp greater or equal to 38C (100.4F), Mild Pain (1 - 3), Moderate Pain (4 - 6)  dextrose Oral Gel 15 Gram(s) Oral once PRN Blood Glucose LESS THAN 70 milliGRAM(s)/deciliter  guaiFENesin Oral Liquid (Sugar-Free) 100 milliGRAM(s) Oral every 6 hours PRN Cough      Vital Signs Last 24 Hrs  T(F): 97.9 (07-15-23 @ 20:00), Max: 100.2 (07-15-23 @ 09:23)  HR: 65 (07-15-23 @ 20:00) (65 - 83)  BP: 166/79 (07-15-23 @ 20:00) (128/71 - 166/79)  RR: 18 (07-15-23 @ 20:00) (18 - 18)  SpO2: 100% (07-15-23 @ 20:00) (99% - 100%)  Telemetry:   CAPILLARY BLOOD GLUCOSE      POCT Blood Glucose.: 207 mg/dL (15 Jul 2023 21:22)  POCT Blood Glucose.: 179 mg/dL (15 Jul 2023 16:37)  POCT Blood Glucose.: 187 mg/dL (15 Jul 2023 12:32)  POCT Blood Glucose.: 246 mg/dL (15 Jul 2023 08:12)    I&O's Summary    14 Jul 2023 07:01  -  15 Jul 2023 07:00  --------------------------------------------------------  IN: 580 mL / OUT: 750 mL / NET: -170 mL        PHYSICAL EXAM:  GENERAL: NAD, well-developed  HEAD:  Atraumatic, Normocephalic  EYES: EOMI, PERRLA, conjunctiva and sclera clear  NECK: Supple, No JVD  CHEST/LUNG: Clear to auscultation bilaterally; No wheeze  HEART: Regular rate and rhythm; No murmurs, rubs, or gallops  ABDOMEN: Soft, Nontender, Nondistended; Bowel sounds present  EXTREMITIES:  2+ Peripheral Pulses, No clubbing, cyanosis, or edema  PSYCH: AAOx3  NEUROLOGY: non-focal  SKIN: No rashes or lesions    LABS:                        8.9    7.07  )-----------( 192      ( 15 Jul 2023 09:53 )             29.7     07-15    140  |  100  |  37<H>  ----------------------------<  224<H>  4.6   |  30  |  2.29<H>    Ca    8.4      15 Jul 2023 09:53  Phos  7.2     07-15  Mg     2.3     07-15            Urinalysis Basic - ( 15 Jul 2023 09:53 )    Color: x / Appearance: x / SG: x / pH: x  Gluc: 224 mg/dL / Ketone: x  / Bili: x / Urobili: x   Blood: x / Protein: x / Nitrite: x   Leuk Esterase: x / RBC: x / WBC x   Sq Epi: x / Non Sq Epi: x / Bacteria: x        RADIOLOGY & ADDITIONAL TESTS:    Imaging Personally Reviewed:    Consultant(s) Notes Reviewed:      Care Discussed with Consultants/Other Providers:

## 2023-07-15 NOTE — PROGRESS NOTE ADULT - SUBJECTIVE AND OBJECTIVE BOX
Follow-up Pulm Progress Note    tmax 100.2 rectally  No new respiratory events overnight.  Denies SOB/CP.   sats high 90s on 3LNC         Medications:  MEDICATIONS  (STANDING):  aspirin enteric coated 81 milliGRAM(s) Oral daily  atorvastatin 10 milliGRAM(s) Oral at bedtime  buDESOnide    Inhalation Suspension 0.5 milliGRAM(s) Inhalation every 12 hours  carvedilol 37.5 milliGRAM(s) Oral every 12 hours  cefepime   IVPB      cefepime   IVPB 1000 milliGRAM(s) IV Intermittent every 24 hours  chlorhexidine 2% Cloths 1 Application(s) Topical <User Schedule>  cholecalciferol 2000 Unit(s) Oral daily  cloNIDine 0.1 milliGRAM(s) Oral two times a day  dextrose 5%. 1000 milliLiter(s) (100 mL/Hr) IV Continuous <Continuous>  dextrose 5%. 1000 milliLiter(s) (50 mL/Hr) IV Continuous <Continuous>  dextrose 50% Injectable 25 Gram(s) IV Push once  dextrose 50% Injectable 12.5 Gram(s) IV Push once  dextrose 50% Injectable 25 Gram(s) IV Push once  donepezil 10 milliGRAM(s) Oral at bedtime  doxazosin 8 milliGRAM(s) Oral at bedtime  furosemide   Injectable 40 milliGRAM(s) IV Push every 12 hours  glucagon  Injectable 1 milliGRAM(s) IntraMuscular once  insulin glargine Injectable (LANTUS) 18 Unit(s) SubCutaneous at bedtime  insulin lispro (ADMELOG) corrective regimen sliding scale   SubCutaneous three times a day before meals  insulin lispro Injectable (ADMELOG) 6 Unit(s) SubCutaneous three times a day before meals  letrozole 2.5 milliGRAM(s) Oral daily  Nephro-stacia 1 Tablet(s) Oral daily  polyethylene glycol 3350 17 Gram(s) Oral two times a day    MEDICATIONS  (PRN):  dextrose Oral Gel 15 Gram(s) Oral once PRN Blood Glucose LESS THAN 70 milliGRAM(s)/deciliter  guaiFENesin Oral Liquid (Sugar-Free) 100 milliGRAM(s) Oral every 6 hours PRN Cough          Vital Signs Last 24 Hrs  T(C): 37.9 (15 Jul 2023 09:23), Max: 37.9 (15 Jul 2023 09:23)  T(F): 100.2 (15 Jul 2023 09:23), Max: 100.2 (15 Jul 2023 09:23)  HR: 83 (15 Jul 2023 04:54) (66 - 83)  BP: 146/72 (15 Jul 2023 04:54) (146/72 - 161/78)  BP(mean): --  RR: 18 (15 Jul 2023 04:54) (18 - 18)  SpO2: 99% (15 Jul 2023 04:54) (98% - 100%)    Parameters below as of 15 Jul 2023 04:54  Patient On (Oxygen Delivery Method): nasal cannula  O2 Flow (L/min): 3            07-14 @ 07:01  -  07-15 @ 07:00  --------------------------------------------------------  IN: 580 mL / OUT: 750 mL / NET: -170 mL          LABS:                        9.1    6.31  )-----------( 197      ( 14 Jul 2023 11:18 )             30.2     07-14    141  |  100  |  39<H>  ----------------------------<  204<H>  4.4   |  29  |  2.24<H>    Ca    8.7      14 Jul 2023 11:18            CAPILLARY BLOOD GLUCOSE      POCT Blood Glucose.: 246 mg/dL (15 Jul 2023 08:12)      Urinalysis Basic - ( 14 Jul 2023 11:18 )    Color: x / Appearance: x / SG: x / pH: x  Gluc: 204 mg/dL / Ketone: x  / Bili: x / Urobili: x   Blood: x / Protein: x / Nitrite: x   Leuk Esterase: x / RBC: x / WBC x   Sq Epi: x / Non Sq Epi: x / Bacteria: x                      CULTURES:     Culture - Acid Fast - Sputum w/Smear (collected 07-14-23 @ 07:25)  Source: .Sputum Sputum          Physical Examination:  PULM: few crackles bilaterally   CVS: S1, S2 heard    RADIOLOGY REVIEWED    CT chest:  < from: CT Chest No Cont (07.12.23 @ 17:11) >  FINDINGS:  Motion limited study.    LUNGS, AIRWAYS AND PLEURA: The central airways are patent with tracheal   and segmental/subsegmental bronchial mucous secretions. There is   multifocal consolidation in bilateral upper lobes, right middle lobe and   lingula with branchingtubular opacities. While finding was present in   2019, previously seen areas of consolidation have resolved and there are   new areas of involvement on the current study. A thick-walled cyst at the   right apex has evolved since 2019, having overalldecreased in size with   increased wall thickening. Small right pleural effusion. No pneumothorax.    MEDIASTINUM AND JUSTINO: Redemonstrated nonspecific prominent multifocal   mediastinal lymph nodes.    HEART/vessels: Heart size is normal. No pericardial effusion. Coronary   arterial and mitral annular calcification. The great vessels are normal   in size.    VISUALIZED UPPER ABDOMEN: This artery calcifications. The kidneys are   partially atrophic.    BONES/SOFT TISSUES: Bilateral mastectomies. Cardiac loop recorder.   Chronic L1 compression deformity, unchanged since December 2021 abdominal   CT. Old sternal and bilateral rib fractures may be related to prior   cardiopulmonary resuscitation. Otherwise no aggressive osseous lesion.    IMPRESSION:  Upper lung predominant bilateral multifocal consolidation with branching   tubular opacities, suggestive of endobronchial spread of infection.   Interval right apical thick-walled cyst since 2019. TB should be   considered in the appropriate clinical setting, among other infectious   etiologies.    Small right pleural effusion.    < end of copied text >   Follow-up Pulm Progress Note    Covering Dr. Mcnulty     tmax 100.2 rectally  No new respiratory events overnight.  Denies SOB/CP.   sats high 90s on 3LNC         Medications:  MEDICATIONS  (STANDING):  aspirin enteric coated 81 milliGRAM(s) Oral daily  atorvastatin 10 milliGRAM(s) Oral at bedtime  buDESOnide    Inhalation Suspension 0.5 milliGRAM(s) Inhalation every 12 hours  carvedilol 37.5 milliGRAM(s) Oral every 12 hours  cefepime   IVPB      cefepime   IVPB 1000 milliGRAM(s) IV Intermittent every 24 hours  chlorhexidine 2% Cloths 1 Application(s) Topical <User Schedule>  cholecalciferol 2000 Unit(s) Oral daily  cloNIDine 0.1 milliGRAM(s) Oral two times a day  dextrose 5%. 1000 milliLiter(s) (100 mL/Hr) IV Continuous <Continuous>  dextrose 5%. 1000 milliLiter(s) (50 mL/Hr) IV Continuous <Continuous>  dextrose 50% Injectable 25 Gram(s) IV Push once  dextrose 50% Injectable 12.5 Gram(s) IV Push once  dextrose 50% Injectable 25 Gram(s) IV Push once  donepezil 10 milliGRAM(s) Oral at bedtime  doxazosin 8 milliGRAM(s) Oral at bedtime  furosemide   Injectable 40 milliGRAM(s) IV Push every 12 hours  glucagon  Injectable 1 milliGRAM(s) IntraMuscular once  insulin glargine Injectable (LANTUS) 18 Unit(s) SubCutaneous at bedtime  insulin lispro (ADMELOG) corrective regimen sliding scale   SubCutaneous three times a day before meals  insulin lispro Injectable (ADMELOG) 6 Unit(s) SubCutaneous three times a day before meals  letrozole 2.5 milliGRAM(s) Oral daily  Nephro-stacia 1 Tablet(s) Oral daily  polyethylene glycol 3350 17 Gram(s) Oral two times a day    MEDICATIONS  (PRN):  dextrose Oral Gel 15 Gram(s) Oral once PRN Blood Glucose LESS THAN 70 milliGRAM(s)/deciliter  guaiFENesin Oral Liquid (Sugar-Free) 100 milliGRAM(s) Oral every 6 hours PRN Cough          Vital Signs Last 24 Hrs  T(C): 37.9 (15 Jul 2023 09:23), Max: 37.9 (15 Jul 2023 09:23)  T(F): 100.2 (15 Jul 2023 09:23), Max: 100.2 (15 Jul 2023 09:23)  HR: 83 (15 Jul 2023 04:54) (66 - 83)  BP: 146/72 (15 Jul 2023 04:54) (146/72 - 161/78)  BP(mean): --  RR: 18 (15 Jul 2023 04:54) (18 - 18)  SpO2: 99% (15 Jul 2023 04:54) (98% - 100%)    Parameters below as of 15 Jul 2023 04:54  Patient On (Oxygen Delivery Method): nasal cannula  O2 Flow (L/min): 3            07-14 @ 07:01  -  07-15 @ 07:00  --------------------------------------------------------  IN: 580 mL / OUT: 750 mL / NET: -170 mL          LABS:                        9.1    6.31  )-----------( 197      ( 14 Jul 2023 11:18 )             30.2     07-14    141  |  100  |  39<H>  ----------------------------<  204<H>  4.4   |  29  |  2.24<H>    Ca    8.7      14 Jul 2023 11:18            CAPILLARY BLOOD GLUCOSE      POCT Blood Glucose.: 246 mg/dL (15 Jul 2023 08:12)      Urinalysis Basic - ( 14 Jul 2023 11:18 )    Color: x / Appearance: x / SG: x / pH: x  Gluc: 204 mg/dL / Ketone: x  / Bili: x / Urobili: x   Blood: x / Protein: x / Nitrite: x   Leuk Esterase: x / RBC: x / WBC x   Sq Epi: x / Non Sq Epi: x / Bacteria: x                      CULTURES:     Culture - Acid Fast - Sputum w/Smear (collected 07-14-23 @ 07:25)  Source: .Sputum Sputum          Physical Examination:  PULM: few crackles bilaterally   CVS: S1, S2 heard    RADIOLOGY REVIEWED    CT chest:  < from: CT Chest No Cont (07.12.23 @ 17:11) >  FINDINGS:  Motion limited study.    LUNGS, AIRWAYS AND PLEURA: The central airways are patent with tracheal   and segmental/subsegmental bronchial mucous secretions. There is   multifocal consolidation in bilateral upper lobes, right middle lobe and   lingula with branchingtubular opacities. While finding was present in   2019, previously seen areas of consolidation have resolved and there are   new areas of involvement on the current study. A thick-walled cyst at the   right apex has evolved since 2019, having overalldecreased in size with   increased wall thickening. Small right pleural effusion. No pneumothorax.    MEDIASTINUM AND JUSTINO: Redemonstrated nonspecific prominent multifocal   mediastinal lymph nodes.    HEART/vessels: Heart size is normal. No pericardial effusion. Coronary   arterial and mitral annular calcification. The great vessels are normal   in size.    VISUALIZED UPPER ABDOMEN: This artery calcifications. The kidneys are   partially atrophic.    BONES/SOFT TISSUES: Bilateral mastectomies. Cardiac loop recorder.   Chronic L1 compression deformity, unchanged since December 2021 abdominal   CT. Old sternal and bilateral rib fractures may be related to prior   cardiopulmonary resuscitation. Otherwise no aggressive osseous lesion.    IMPRESSION:  Upper lung predominant bilateral multifocal consolidation with branching   tubular opacities, suggestive of endobronchial spread of infection.   Interval right apical thick-walled cyst since 2019. TB should be   considered in the appropriate clinical setting, among other infectious   etiologies.    Small right pleural effusion.    < end of copied text >

## 2023-07-15 NOTE — PROGRESS NOTE ADULT - SUBJECTIVE AND OBJECTIVE BOX
Patient seen and examined in bed.   No new events overnight.  +contact precautions; r/o TB    REVIEW OF SYSTEMS:  As per HPI, otherwise 8 full 10 ROS were unremarkable    MEDICATIONS  (STANDING):  aspirin enteric coated 81 milliGRAM(s) Oral daily  atorvastatin 10 milliGRAM(s) Oral at bedtime  buDESOnide    Inhalation Suspension 0.5 milliGRAM(s) Inhalation every 12 hours  carvedilol 37.5 milliGRAM(s) Oral every 12 hours  cefepime   IVPB      cefepime   IVPB 1000 milliGRAM(s) IV Intermittent every 24 hours  chlorhexidine 2% Cloths 1 Application(s) Topical <User Schedule>  cholecalciferol 2000 Unit(s) Oral daily  cloNIDine 0.1 milliGRAM(s) Oral two times a day  dextrose 5%. 1000 milliLiter(s) (100 mL/Hr) IV Continuous <Continuous>  dextrose 5%. 1000 milliLiter(s) (50 mL/Hr) IV Continuous <Continuous>  dextrose 50% Injectable 25 Gram(s) IV Push once  dextrose 50% Injectable 12.5 Gram(s) IV Push once  dextrose 50% Injectable 25 Gram(s) IV Push once  donepezil 10 milliGRAM(s) Oral at bedtime  doxazosin 8 milliGRAM(s) Oral at bedtime  furosemide   Injectable 40 milliGRAM(s) IV Push every 12 hours  glucagon  Injectable 1 milliGRAM(s) IntraMuscular once  insulin glargine Injectable (LANTUS) 18 Unit(s) SubCutaneous at bedtime  insulin lispro (ADMELOG) corrective regimen sliding scale   SubCutaneous three times a day before meals  insulin lispro Injectable (ADMELOG) 7 Unit(s) SubCutaneous three times a day before meals  letrozole 2.5 milliGRAM(s) Oral daily  Nephro-stacia 1 Tablet(s) Oral daily  polyethylene glycol 3350 17 Gram(s) Oral two times a day      VITAL:  T(C): , Max: 37.9 (07-15-23 @ 09:23)  T(F): , Max: 100.2 (07-15-23 @ 09:23)  HR: 83 (07-15-23 @ 04:54)  BP: 146/72 (07-15-23 @ 04:54)  BP(mean): --  RR: 18 (07-15-23 @ 04:54)  SpO2: 99% (07-15-23 @ 04:54)  Wt(kg): --    I and O's:    07-14 @ 07:01  -  07-15 @ 07:00  --------------------------------------------------------  IN: 580 mL / OUT: 750 mL / NET: -170 mL          PHYSICAL EXAM:    Constitutional: NAD  HEENT: PERRLA, EOMI,  MMM  Neck: No LAD, No JVD  Respiratory: CTAB  Cardiovascular: S1 and S2  Gastrointestinal: BS+, soft, NT/ND  Extremities: No peripheral edema  Neurological: slight lethargy noted  : No Wheeler  Skin: No rashes  Access: Not applicable    LABS:                        8.9    7.07  )-----------( 192      ( 15 Jul 2023 09:53 )             29.7     07-15    140  |  100  |  37<H>  ----------------------------<  224<H>  4.6   |  30  |  2.29<H>    Ca    8.4      15 Jul 2023 09:53  Phos  7.2     07-15  Mg     2.3     07-15        Urine Studies:  Urinalysis Basic - ( 15 Jul 2023 09:53 )    Color: x / Appearance: x / SG: x / pH: x  Gluc: 224 mg/dL / Ketone: x  / Bili: x / Urobili: x   Blood: x / Protein: x / Nitrite: x   Leuk Esterase: x / RBC: x / WBC x   Sq Epi: x / Non Sq Epi: x / Bacteria: x      IMPRESSION: 73F w/ HTN, DM2, CVA, breast CA-b/l mastectomy, and CAC-trach/reversal, 7/12/23 p/w cough/sob    (1)Renal - CKD4 with nephrotic-range proteinuria - highly likely from diabetic nephropathy. At/near baseline GFR as of 7/12.  Renal fxn stable    (2)CV - stage 2 hypertension - allergic to CCB and Hydralazine - now on Coreg 37.5mg bid, Clonidine 0.1mg bid, Cardura 8mg qhs, and Lasix 40mg IV BID.    (3)Hyperkalemia - mild on admission - difficult to achieve low-K diet when reliant on pureed food. K+ stable     (4)Anemia - CKD-associated?  slowly trending down, okay for now     (5)Cavitary lung lesion - Pulm and ID on board - being ruled out for TB    (6)Aspiration - on pureed diet; for swallowing eval      RECOMMEND:  (1)Patient now off Losartan 25mg po qd; continue to monitor renal fxn   (2)Favor continuing Clonidine as ordered for now; can d/c within next 1-2 days if fatigue persists  (3)Can continue IV Lasix as ordered for now; would cut back to Lasix 40mg po qd if creatinine trends to 2.4 or higher  (4)Can forgo SGLT2i for now  (5)Can forgo dietary potassium restriction, as she is reliant on a pureed diet; Lokelma 10gm po prn K 5.1 or higher  (6)Diuretics as ordered for now  (7)BMP daily  (8)Antimicrobials for GFR 20-30ml/min

## 2023-07-15 NOTE — PROGRESS NOTE ADULT - ASSESSMENT
72 y/o female with multiple medical problems whom i see in my practice on outptn basis. last home visit marichuy 15   Ptn has h/o DM, HTN, breast CA, respiratory arrest and cardiac arrest (2018), CVA with residual weakness, h/o trache, PEG, both removed, now eating on her own but aspirates and has post prandial cough in addition to chronic productive cough. presumed 2/2 silent aspiration of her saliva      pt  presents to the ED with  complaining of worsening cough and orthopnea. ptn has chronic LE edema.       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 2 other specimens in lab - f/u results   acute on chronic CHF  -echo noted:   -defer to primary cards team   RUSS   -she likely has ac on chr CKD:  -she always had high creat before:   HTN  -her blood pressure is slightly had:   -cont to optimize anti hypertensives:   DM  monitor and control :    dvt prophylaxis

## 2023-07-16 NOTE — PROGRESS NOTE ADULT - ASSESSMENT
74 y/o female with multiple medical problems whom i see in my practice on outptn basis. last home visit marichuy 15   Ptn has h/o DM, HTN, breast CA, respiratory arrest and cardiac arrest (2018), CVA with residual weakness, h/o trache, PEG, both removed, now eating on her own but aspirates and has post prandial cough in addition to chronic productive cough. presumed 2/2 silent aspiration of her saliva      pt  presents to the ED with  complaining of worsening cough and orthopnea. ptn has chronic LE edema.       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 2 other specimens in lab - f/u results :  -she is lethargic: check ABG: : no sedatives:   RASH:   -new rash  :  -on steroids cream :   cefepime changed to zosyn: id following  acute on chronic CHF  -echo noted:   -defer to primary cards team   RUSS   -she likely has ac on chr CKD:  -she always had high creat before:   HTN  -her blood pressure is slightly had:   -cont to optimize anti hypertensives:   DM  monitor and control :    dvt prophylaxis

## 2023-07-16 NOTE — PROGRESS NOTE ADULT - SUBJECTIVE AND OBJECTIVE BOX
Subjective: Patient seen and examined. No new events except as noted.   very sleepy    at bedside       REVIEW OF SYSTEMS:  Unable to obtain     MEDICATIONS:  MEDICATIONS  (STANDING):  albuterol/ipratropium for Nebulization 3 milliLiter(s) Nebulizer every 6 hours  aspirin enteric coated 81 milliGRAM(s) Oral daily  atorvastatin 10 milliGRAM(s) Oral at bedtime  buDESOnide    Inhalation Suspension 0.5 milliGRAM(s) Inhalation every 12 hours  carvedilol 37.5 milliGRAM(s) Oral every 12 hours  chlorhexidine 2% Cloths 1 Application(s) Topical <User Schedule>  cholecalciferol 2000 Unit(s) Oral daily  cloNIDine 0.1 milliGRAM(s) Oral two times a day  dextrose 5%. 1000 milliLiter(s) (100 mL/Hr) IV Continuous <Continuous>  dextrose 5%. 1000 milliLiter(s) (50 mL/Hr) IV Continuous <Continuous>  dextrose 50% Injectable 25 Gram(s) IV Push once  dextrose 50% Injectable 25 Gram(s) IV Push once  dextrose 50% Injectable 12.5 Gram(s) IV Push once  donepezil 10 milliGRAM(s) Oral at bedtime  doxazosin 8 milliGRAM(s) Oral at bedtime  fluticasone propionate 50 MICROgram(s)/spray Nasal Spray 1 Spray(s) Both Nostrils two times a day  glucagon  Injectable 1 milliGRAM(s) IntraMuscular once  hydrocortisone 1% Cream 1 Application(s) Topical two times a day  insulin glargine Injectable (LANTUS) 18 Unit(s) SubCutaneous at bedtime  insulin lispro (ADMELOG) corrective regimen sliding scale   SubCutaneous three times a day before meals  insulin lispro Injectable (ADMELOG) 7 Unit(s) SubCutaneous three times a day before meals  letrozole 2.5 milliGRAM(s) Oral daily  Nephro-stacia 1 Tablet(s) Oral daily  piperacillin/tazobactam IVPB.. 3.375 Gram(s) IV Intermittent every 12 hours  polyethylene glycol 3350 17 Gram(s) Oral two times a day  sodium chloride 0.9% for Nebulization 3 milliLiter(s) Nebulizer every 6 hours      PHYSICAL EXAM:  T(C): 37.4 (07-16-23 @ 06:52), Max: 37.9 (07-15-23 @ 09:23)  HR: 81 (07-16-23 @ 06:52) (65 - 81)  BP: 135/57 (07-16-23 @ 06:52) (128/71 - 166/79)  RR: 17 (07-16-23 @ 06:52) (17 - 18)  SpO2: 100% (07-16-23 @ 06:52) (100% - 100%)  Wt(kg): --  I&O's Summary    15 Jul 2023 07:01  -  16 Jul 2023 07:00  --------------------------------------------------------  IN: 0 mL / OUT: 1000 mL / NET: -1000 mL        Appearance: NAD 3 liters NC   HEENT:   Normal oral mucosa, PERRL, EOMI	  Lymphatic: No lymphadenopathy  Cardiovascular: Normal S1 S2, No JVD, No murmurs, No edema  Respiratory: decreased bs   Psychiatry: A & O x 1 very sleepy   Gastrointestinal:  Soft, Non-tender, + BS	  Skin: No rashes, No ecchymoses, No cyanosis	  Neurologic: Non-focal  Extremities: Normal range of motion, No clubbing, cyanosis or edema  Vascular: Peripheral pulses palpable 2+ bilaterally      LABS:    CARDIAC MARKERS:                                8.9    7.07  )-----------( 192      ( 15 Jul 2023 09:53 )             29.7     07-15    140  |  100  |  37<H>  ----------------------------<  224<H>  4.6   |  30  |  2.29<H>    Ca    8.4      15 Jul 2023 09:53  Phos  7.2     07-15  Mg     2.3     07-15      proBNP:   Lipid Profile:   HgA1c:   TSH:             TELEMETRY: 	SR    ECG:  	  RADIOLOGY:   DIAGNOSTIC TESTING:  [ ] Echocardiogram:  [ ]  Catheterization:  [ ] Stress Test:    OTHER:

## 2023-07-16 NOTE — PROGRESS NOTE ADULT - SUBJECTIVE AND OBJECTIVE BOX
Chief complaint  Patient is a 73y old  Female who presents with a chief complaint of respiratory distress (16 Jul 2023 13:23)         Labs and Fingersticks  CAPILLARY BLOOD GLUCOSE      POCT Blood Glucose.: 101 mg/dL (16 Jul 2023 12:22)  POCT Blood Glucose.: 197 mg/dL (16 Jul 2023 07:57)  POCT Blood Glucose.: 207 mg/dL (15 Jul 2023 21:22)  POCT Blood Glucose.: 179 mg/dL (15 Jul 2023 16:37)      Anion Gap: 11 (07-16 @ 09:19)  Anion Gap: 10 (07-15 @ 09:53)      Calcium: 8.2 *L* (07-16 @ 09:19)  Calcium: 8.4 (07-15 @ 09:53)          07-16    139  |  99  |  35<H>  ----------------------------<  177<H>  4.3   |  29  |  2.38<H>    Ca    8.2<L>      16 Jul 2023 09:19  Phos  7.2     07-15  Mg     2.3     07-15                          8.8    7.78  )-----------( 176      ( 16 Jul 2023 09:19 )             30.1     Medications  MEDICATIONS  (STANDING):  albuterol/ipratropium for Nebulization 3 milliLiter(s) Nebulizer every 6 hours  aspirin enteric coated 81 milliGRAM(s) Oral daily  atorvastatin 10 milliGRAM(s) Oral at bedtime  buDESOnide    Inhalation Suspension 0.5 milliGRAM(s) Inhalation every 12 hours  carvedilol 37.5 milliGRAM(s) Oral every 12 hours  chlorhexidine 2% Cloths 1 Application(s) Topical <User Schedule>  cholecalciferol 2000 Unit(s) Oral daily  cloNIDine 0.1 milliGRAM(s) Oral two times a day  dextrose 5%. 1000 milliLiter(s) (100 mL/Hr) IV Continuous <Continuous>  dextrose 5%. 1000 milliLiter(s) (50 mL/Hr) IV Continuous <Continuous>  dextrose 50% Injectable 25 Gram(s) IV Push once  dextrose 50% Injectable 25 Gram(s) IV Push once  dextrose 50% Injectable 12.5 Gram(s) IV Push once  donepezil 10 milliGRAM(s) Oral at bedtime  doxazosin 8 milliGRAM(s) Oral at bedtime  fluticasone propionate 50 MICROgram(s)/spray Nasal Spray 1 Spray(s) Both Nostrils two times a day  glucagon  Injectable 1 milliGRAM(s) IntraMuscular once  hydrocortisone 1% Cream 1 Application(s) Topical two times a day  insulin glargine Injectable (LANTUS) 21 Unit(s) SubCutaneous at bedtime  insulin lispro (ADMELOG) corrective regimen sliding scale   SubCutaneous three times a day before meals  insulin lispro Injectable (ADMELOG) 8 Unit(s) SubCutaneous three times a day before meals  letrozole 2.5 milliGRAM(s) Oral daily  Nephro-stacia 1 Tablet(s) Oral daily  piperacillin/tazobactam IVPB.. 3.375 Gram(s) IV Intermittent every 12 hours  polyethylene glycol 3350 17 Gram(s) Oral two times a day  sodium chloride 0.9% for Nebulization 3 milliLiter(s) Nebulizer every 6 hours      Physical Exam  General: Patient comfortable in bed   Vital Signs Last 12 Hrs  T(F): 99 (07-16-23 @ 11:43), Max: 99.3 (07-16-23 @ 06:52)  HR: 62 (07-16-23 @ 11:10) (62 - 81)  BP: 117/64 (07-16-23 @ 11:10) (117/64 - 135/57)  BP(mean): --  RR: 16 (07-16-23 @ 11:10) (16 - 17)  SpO2: 96% (07-16-23 @ 11:10) (96% - 100%)    CVS: S1S2   Respiratory: No wheezing, no crepitations  GI: Abdomen soft, bowel sounds positive  Musculoskeletal:  moves all extremities  : Voiding            Chief complaint  Patient is a 73y old  Female who presents with a chief complaint of respiratory distress (16 Jul 2023 13:23)         Labs and Fingersticks  CAPILLARY BLOOD GLUCOSE      POCT Blood Glucose.: 101 mg/dL (16 Jul 2023 12:22)  POCT Blood Glucose.: 197 mg/dL (16 Jul 2023 07:57)  POCT Blood Glucose.: 207 mg/dL (15 Jul 2023 21:22)  POCT Blood Glucose.: 179 mg/dL (15 Jul 2023 16:37)      Anion Gap: 11 (07-16 @ 09:19)  Anion Gap: 10 (07-15 @ 09:53)      Calcium: 8.2 *L* (07-16 @ 09:19)  Calcium: 8.4 (07-15 @ 09:53)          07-16    139  |  99  |  35<H>  ----------------------------<  177<H>  4.3   |  29  |  2.38<H>    Ca    8.2<L>      16 Jul 2023 09:19  Phos  7.2     07-15  Mg     2.3     07-15                          8.8    7.78  )-----------( 176      ( 16 Jul 2023 09:19 )             30.1     Medications  MEDICATIONS  (STANDING):  albuterol/ipratropium for Nebulization 3 milliLiter(s) Nebulizer every 6 hours  aspirin enteric coated 81 milliGRAM(s) Oral daily  atorvastatin 10 milliGRAM(s) Oral at bedtime  buDESOnide    Inhalation Suspension 0.5 milliGRAM(s) Inhalation every 12 hours  carvedilol 37.5 milliGRAM(s) Oral every 12 hours  chlorhexidine 2% Cloths 1 Application(s) Topical <User Schedule>  cholecalciferol 2000 Unit(s) Oral daily  cloNIDine 0.1 milliGRAM(s) Oral two times a day  dextrose 5%. 1000 milliLiter(s) (100 mL/Hr) IV Continuous <Continuous>  dextrose 5%. 1000 milliLiter(s) (50 mL/Hr) IV Continuous <Continuous>  dextrose 50% Injectable 25 Gram(s) IV Push once  dextrose 50% Injectable 25 Gram(s) IV Push once  dextrose 50% Injectable 12.5 Gram(s) IV Push once  donepezil 10 milliGRAM(s) Oral at bedtime  doxazosin 8 milliGRAM(s) Oral at bedtime  fluticasone propionate 50 MICROgram(s)/spray Nasal Spray 1 Spray(s) Both Nostrils two times a day  glucagon  Injectable 1 milliGRAM(s) IntraMuscular once  hydrocortisone 1% Cream 1 Application(s) Topical two times a day  insulin glargine Injectable (LANTUS) 21 Unit(s) SubCutaneous at bedtime  insulin lispro (ADMELOG) corrective regimen sliding scale   SubCutaneous three times a day before meals  insulin lispro Injectable (ADMELOG) 8 Unit(s) SubCutaneous three times a day before meals  letrozole 2.5 milliGRAM(s) Oral daily  Nephro-stacia 1 Tablet(s) Oral daily  piperacillin/tazobactam IVPB.. 3.375 Gram(s) IV Intermittent every 12 hours  polyethylene glycol 3350 17 Gram(s) Oral two times a day  sodium chloride 0.9% for Nebulization 3 milliLiter(s) Nebulizer every 6 hours      Physical Exam  General: Patient comfortable in bed   Vital Signs Last 12 Hrs  T(F): 99 (07-16-23 @ 11:43), Max: 99.3 (07-16-23 @ 06:52)  HR: 62 (07-16-23 @ 11:10) (62 - 81)  BP: 117/64 (07-16-23 @ 11:10) (117/64 - 135/57)  BP(mean): --  RR: 16 (07-16-23 @ 11:10) (16 - 17)  SpO2: 96% (07-16-23 @ 11:10) (96% - 100%)    CVS: S1S2   Respiratory: No wheezing, no crepitations  GI: Abdomen soft, bowel sounds positive  Musculoskeletal:  moves all extremities  : Voiding

## 2023-07-16 NOTE — PROGRESS NOTE ADULT - SUBJECTIVE AND OBJECTIVE BOX
Patient is a 73y old  Female who presents with a chief complaint of respiratory distress (16 Jul 2023 13:31)      SUBJECTIVE / OVERNIGHT EVENTS: ptn is lethargic, has a rash, daughter is concerned its a reaction to the ABx, nonpruritic. daughter is refusing to start LOSARTAN. discussed its renal protective in ptns w DM. green sputum resolved, now its clear-yellow    MEDICATIONS  (STANDING):  albuterol/ipratropium for Nebulization 3 milliLiter(s) Nebulizer every 6 hours  aspirin enteric coated 81 milliGRAM(s) Oral daily  atorvastatin 10 milliGRAM(s) Oral at bedtime  buDESOnide    Inhalation Suspension 0.5 milliGRAM(s) Inhalation every 12 hours  carvedilol 37.5 milliGRAM(s) Oral every 12 hours  chlorhexidine 2% Cloths 1 Application(s) Topical <User Schedule>  cholecalciferol 2000 Unit(s) Oral daily  cloNIDine 0.1 milliGRAM(s) Oral two times a day  dextrose 5%. 1000 milliLiter(s) (100 mL/Hr) IV Continuous <Continuous>  dextrose 5%. 1000 milliLiter(s) (50 mL/Hr) IV Continuous <Continuous>  dextrose 50% Injectable 25 Gram(s) IV Push once  dextrose 50% Injectable 12.5 Gram(s) IV Push once  dextrose 50% Injectable 25 Gram(s) IV Push once  donepezil 10 milliGRAM(s) Oral at bedtime  doxazosin 8 milliGRAM(s) Oral at bedtime  fluticasone propionate 50 MICROgram(s)/spray Nasal Spray 1 Spray(s) Both Nostrils two times a day  glucagon  Injectable 1 milliGRAM(s) IntraMuscular once  hydrocortisone 1% Cream 1 Application(s) Topical two times a day  insulin glargine Injectable (LANTUS) 21 Unit(s) SubCutaneous at bedtime  insulin lispro (ADMELOG) corrective regimen sliding scale   SubCutaneous three times a day before meals  insulin lispro Injectable (ADMELOG) 8 Unit(s) SubCutaneous three times a day before meals  letrozole 2.5 milliGRAM(s) Oral daily  Nephro-stacia 1 Tablet(s) Oral daily  piperacillin/tazobactam IVPB.. 3.375 Gram(s) IV Intermittent every 12 hours  polyethylene glycol 3350 17 Gram(s) Oral two times a day  sodium chloride 0.9% for Nebulization 3 milliLiter(s) Nebulizer every 6 hours    MEDICATIONS  (PRN):  acetaminophen     Tablet .. 650 milliGRAM(s) Oral every 6 hours PRN Temp greater or equal to 38C (100.4F), Mild Pain (1 - 3), Moderate Pain (4 - 6)  dextrose Oral Gel 15 Gram(s) Oral once PRN Blood Glucose LESS THAN 70 milliGRAM(s)/deciliter  guaiFENesin Oral Liquid (Sugar-Free) 100 milliGRAM(s) Oral every 6 hours PRN Cough      Vital Signs Last 24 Hrs  T(F): 99 (07-16-23 @ 11:43), Max: 99.3 (07-16-23 @ 06:52)  HR: 62 (07-16-23 @ 11:10) (62 - 81)  BP: 117/64 (07-16-23 @ 11:10) (117/64 - 166/79)  RR: 16 (07-16-23 @ 11:10) (16 - 18)  SpO2: 96% (07-16-23 @ 11:10) (96% - 100%)  Telemetry:   CAPILLARY BLOOD GLUCOSE      POCT Blood Glucose.: 101 mg/dL (16 Jul 2023 12:22)  POCT Blood Glucose.: 197 mg/dL (16 Jul 2023 07:57)  POCT Blood Glucose.: 207 mg/dL (15 Jul 2023 21:22)  POCT Blood Glucose.: 179 mg/dL (15 Jul 2023 16:37)    I&O's Summary    15 Jul 2023 07:01  -  16 Jul 2023 07:00  --------------------------------------------------------  IN: 0 mL / OUT: 1000 mL / NET: -1000 mL        PHYSICAL EXAM:  GENERAL: NAD, well-developed  HEAD:  Atraumatic, Normocephalic  EYES: EOMI, PERRLA, conjunctiva and sclera clear  NECK: Supple, No JVD  CHEST/LUNG: Clear to auscultation bilaterally; No wheeze  HEART: Regular rate and rhythm; No murmurs, rubs, or gallops  ABDOMEN: Soft, Nontender, Nondistended; Bowel sounds present  EXTREMITIES:  2+ Peripheral Pulses, No clubbing, cyanosis, or edema  PSYCH: AAOx3  NEUROLOGY: non-focal  SKIN: No rashes or lesions    LABS:                        8.8    7.78  )-----------( 176      ( 16 Jul 2023 09:19 )             30.1     07-16    139  |  99  |  35<H>  ----------------------------<  177<H>  4.3   |  29  |  2.38<H>    Ca    8.2<L>      16 Jul 2023 09:19  Phos  7.2     07-15  Mg     2.3     07-15            Urinalysis Basic - ( 16 Jul 2023 09:19 )    Color: x / Appearance: x / SG: x / pH: x  Gluc: 177 mg/dL / Ketone: x  / Bili: x / Urobili: x   Blood: x / Protein: x / Nitrite: x   Leuk Esterase: x / RBC: x / WBC x   Sq Epi: x / Non Sq Epi: x / Bacteria: x        RADIOLOGY & ADDITIONAL TESTS:    Imaging Personally Reviewed:    Consultant(s) Notes Reviewed:      Care Discussed with Consultants/Other Providers:

## 2023-07-16 NOTE — PROGRESS NOTE ADULT - ASSESSMENT
74 y/o female with multiple medical problems whom i see in my practice on outptn basis. last home visit marichuy 15   Ptn has h/o DM, HTN, breast CA, respiratory arrest and cardiac arrest (2018), CVA with residual weakness, h/o trache, PEG, both removed, now eating on her own but aspirates and has post prandial cough in addition to chronic productive cough. presumed 2/2 silent aspiration of her saliva this was d/w Dr. Jose Almendarez who is in agreement    Today ptn presents to the ED with  complaining of worsening cough and orthopnea. ptn has chronic LE edema.   Denies any headache, fever, chills, chest pain, abdominal pain, n/v/d, dysuria. Unable to obtain any history or ROS due to mental status.    A/P  7/12: Aspiration PNA, cannot R/O BRYAN. check sputum culture, acute on chronic diastolic chf, , chronic aspiration  ptn has multiple ABx allergies, start Cipro and flagyl which she tolerated in the past  cont outptn meds, get S&S eval  insulin on a sliding scale, RUSS, check bladder sono, renal sono  card, renal, pulm, endo ID called  cont outptn meds  7/13: ptn is awake, alert, daughter at bedside. daughter mentioned [facility], wants me to cont seeing her on outptn at home and her but also wants to cont w [facility] on outptn. i explained to her she needs to pick a pcp, she cannot have me and [facility] in the hospital. she wants to stay with me, if her father changes his mind, she will let me know.   Ptn looks better today, more communicative, smiling, eating rice pudding, coughing after each bite. awaitng S&S, will need MBS. didnt tolerate FEEST in the past. ptn is hungry. she used to have a trache and a PEG post CVA, was removed few years ago. HTN is not controlled coreg raised to 37.5 bid. ptn seen by Renal, Scr at baseline, started on Farxiga and will start Losartan 25 mg    7/14: MBS done: recs are small soft bite food w mildly thickened liquids. renal, card, ID, pulm, endo input appreciated. Ptn is in isolation, being ruled out for active TB. 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 now on CEFEPIME, sputum for TB ordered. ptn is lethargic, BP improved, daughter feels its 2/2 CLONIDINE. as per renal start LOSARTAN. i started LOSARTAN yesterday, but it was DCed. lethargy could be due to overdiuresis. remains on LASIX 40 mg q12H. family wants the ptn to go home . awaiting ID clearance prior to DC. daughter c/o LE weakness and inability to ambulate, neuro called    7/15: ptn is on ABx for PNA, being worked up for TB, seen by Neuro. no immediate interventions planned. cont present meds    7/16:  ptn is lethargic, has a rash, daughter is concerned its a reaction to the ABx, nonpruritic. daughter is refusing to start LOSARTAN. discussed its renal protective in ptns w DM. green sputum resolved, now its clear-yellow                  dvt ppx w [facility]

## 2023-07-16 NOTE — PROGRESS NOTE ADULT - ASSESSMENT
73F female h/o DM, HTN, breast CA, respiratory arrest and cardiac arrest (2018), CVA with residual weakness, h/o trache, PEG, being worked up for asp PNA.   Assessment  DMT2: 73y Female with DM T2 with hyperglycemia, A1C pending, was on oral meds and MIXED insulin at home, now on basal bolus insulin with coverage, blood sugars now improving  Asp PNA: ?chronic aspiration, sputum cultures. Aspiration precautions.   HTN: on antihypertensive medications, monitored, asymptomatic.  CKD: Monitor labs/BMP, renal dosing.     Discussed plan and management wit Dr Naresh Bales NP - TEAMS  Viraj Infante MD  Cell: [phone]  Office: [phone]             73F female h/o DM, HTN, breast CA, respiratory arrest and cardiac arrest (2018), CVA with residual weakness, h/o trache, PEG, being worked up for asp PNA.   Assessment  DMT2: 73y Female with DM T2 with hyperglycemia, A1C pending, was on oral meds and MIXED insulin at home, now on basal bolus insulin with coverage, blood  sugars now improving  Asp PNA: ?chronic aspiration, sputum cultures. Aspiration precautions.   HTN: on antihypertensive medications, monitored, asymptomatic.  CKD: Monitor labs/BMP, renal dosing.     Discussed plan and management wit Dr Naresh Bales NP - TEAMS  Viraj Infante MD  Cell: [phone]  Office: [phone]

## 2023-07-16 NOTE — CHART NOTE - NSCHARTNOTEFT_GEN_A_CORE
EVENT: asked by RN to see pt for lethargy, and that pt's family at bedside is concern  chart reviewed, pt seen with family (daughter and spouse) at bedside  Daughter offered concern off lethargy and "she is not eating"; initially conversation with daughter left the impression that patient did not eat all day. At the end of the visit reviewing the conversation, pt's daughter acknowledged that they brought food from home today and she ate "50%" of what was brought in for lunch "which is her usual amount at home" as per daughter. Spouse at bedside acknowledged he had conversation with the team "a doctor" today and discussed the lethargy which he said was present "since yesterday" but is a more today. Chart review notable for attending and consultants addressing pt's lethargy    Brief assess Pt supine in bed with head of bed elevated to ~60 degrees marked audible rhonchi, mucousy/junky sounding trache cough. she keeps her eyes closed but also her teeth tightly clenched when the RN is trying to suction her given her cough/secretions. When she did cough and RN able to get suction tube further to suction, moderate amt of thick clear secretions noted.   respiration even and unlabored  no acute distress noted  stable VS  given chart review and per d/w family at bedside, she appears to be stable from since earlier this morning  her Scr is slightly uptrending and f/s is lower than usual but this is in the setting of her last meal being lunch.  will start LR at 50cc/her for 10hrs and monitor  Echo reviewed with LVEF of 59% no significant valvular disease   Spouse and dtr reports feelings "reassured"  will continue to monitor pt, con't with current plan of care and add LR     Vital Signs Last 24 Hrs  T(C): 36.9 (16 Jul 2023 20:38), Max: 37.4 (16 Jul 2023 06:52)  T(F): 98.5 (16 Jul 2023 20:38), Max: 99.3 (16 Jul 2023 06:52)  HR: 68 (16 Jul 2023 20:38) (62 - 81)  BP: 142/71 (16 Jul 2023 20:38) (117/64 - 142/71)  RR: 18 (16 Jul 2023 20:38) (16 - 18)  SpO2: 98% (16 Jul 2023 20:38) (96% - 100%)    Parameters below as of 16 Jul 2023 20:38  Patient On (Oxygen Delivery Method): nasal cannula  O2 Flow (L/min): 2      Basic Metabolic Panel in AM (07.16.23 @ 09:19)    Sodium: 139 mmol/L    Potassium: 4.3 mmol/L    Chloride: 99 mmol/L    Carbon Dioxide: 29 mmol/L    Anion Gap: 11 mmol/L    Blood Urea Nitrogen: 35 mg/dL    Creatinine: 2.38 mg/dL    Glucose: 177 mg/dL    Calcium: 8.2 mg/dL    eGFR: 21: The estimated glomerular filtration rate (eGFR) is calculated using the  2021 CKD-EPI creatinine equation, which does not have a coefficient for  race and is validated in individuals 18 years of age and older (N Engl J  Med 2021; 385:2892-5841). Creatinine-based eGFR may be inaccurate in  various situations including but not limited to extremes of muscle mass,  altered dietary protein intake, or medications that affect renal tubular  creatinine secretion. mL/min/1.73m2    Creatinine: 2.29 mg/dL (07.15.23 @ 09:53)  Creatinine: 2.24 mg/dL (07.14.23 @ 11:18)  Creatinine: 2.07 mg/dL (07.12.23 @ 16:17)  Previous admission  Creatinine, Serum: 1.39 mg/dL (01.04.22 @ 08:24)    < from: TTE W or WO Ultrasound Enhancing Agent (07.13.23 @ 08:06) >    FINDINGS:     Left Ventricle:  The left ventricular systolic function is normal with a calculated ejection fraction of 59 % by the Hammer's biplane method of disks. There is mild (grade 1) left ventricular diastolic dysfunction, with normal filling pressure. Analysis of left ventricular diastolic function and filling pressure is made challenging by the presence of mitral annular calcification. Severe left ventricular hypertrophy. There is increased LV mass and concentric hypertrophy. Left ventricular endocardium is not well visualized; however, the left ventricular systolic function appears grossly normal.     Right Ventricle:  Normal right ventricular cavity size, normal wall thickness and normal right ventricular systolic function. Tricuspid annular plane systolic excursion (TAPSE) is 2.1 cm (normal >=1.7 cm). Tricuspid annular tissue Doppler S' is 10.8 cm/s (normal >10 cm/s).     Left Atrium:  The left atrium is normal with an indexed volume of 33.13 ml/m².     Right Atrium:  The right atrium is normal with an indexed volume of 17.69 ml/m².     Aortic Valve:  The aortic valve appears trileaflet. There is mild calcification of the aortic valve leaflets. There is mild aortic stenosis. The peak transaortic velocity is 2.27 m/s, peak transaortic gradient is 20.6 mmHg and mean transaortic gradient is 10.0 mmHg with an LVOT/aortic valve VTI ratio of 0.40. The aortic valve acceleration time is 100 msec.    < end of copied text > EVENT: asked by RN to see pt for lethargy, and that pt's family at bedside is concern  chart reviewed, pt seen with family (daughter and spouse) at bedside  Daughter offered concern off lethargy and "she is not eating"; initially conversation with daughter left the impression that patient did not eat all day. At the end of the visit reviewing the conversation, pt's daughter acknowledged that they brought food from home today and she ate "50%" of what was brought in for lunch "which is her usual amount at home" as per daughter. Spouse at bedside acknowledged he had conversation with the team "a doctor" today and discussed the lethargy which he said was present "since yesterday" but is a more today. Chart review notable for attending and consultants addressing pt's lethargy    Brief assess Pt supine in bed with head of bed elevated to ~60 degrees marked audible rhonchi, mucousy/junky sounding trache cough. she keeps her eyes closed but also her teeth tightly clenched when the RN is trying to suction her given her cough/secretions. When she did cough and RN able to get suction tube further to suction, moderate amt of thick clear secretions noted.   respiration even and unlabored  no acute distress noted  stable VS  given chart review and per d/w family at bedside, she appears to be stable from since earlier this morning  her Scr is slightly uptrending and f/s is lower than usual but this is in the setting of her last meal being lunch.  will start LR at 50cc/her for 10hrs and monitor  will hold off dosing with insulin given she has not eaten dinner and refuses to open her mouth to eat her take her meds  if BP is >160 will give IV meds PRN for tonight given she clenches her teeth tightly/clearly refuses to open her mouth  Echo reviewed with LVEF of 59% no significant valvular disease   Spouse and dtr reports feelings "reassured"  will continue to monitor     Vital Signs Last 24 Hrs  T(C): 36.9 (16 Jul 2023 20:38), Max: 37.4 (16 Jul 2023 06:52)  T(F): 98.5 (16 Jul 2023 20:38), Max: 99.3 (16 Jul 2023 06:52)  HR: 68 (16 Jul 2023 20:38) (62 - 81)  BP: 142/71 (16 Jul 2023 20:38) (117/64 - 142/71)  RR: 18 (16 Jul 2023 20:38) (16 - 18)  SpO2: 98% (16 Jul 2023 20:38) (96% - 100%)    Parameters below as of 16 Jul 2023 20:38  Patient On (Oxygen Delivery Method): nasal cannula  O2 Flow (L/min): 2      Basic Metabolic Panel in AM (07.16.23 @ 09:19)    Sodium: 139 mmol/L    Potassium: 4.3 mmol/L    Chloride: 99 mmol/L    Carbon Dioxide: 29 mmol/L    Anion Gap: 11 mmol/L    Blood Urea Nitrogen: 35 mg/dL    Creatinine: 2.38 mg/dL    Glucose: 177 mg/dL    Calcium: 8.2 mg/dL    eGFR: 21: The estimated glomerular filtration rate (eGFR) is calculated using the  2021 CKD-EPI creatinine equation, which does not have a coefficient for  race and is validated in individuals 18 years of age and older (N Engl J  Med 2021; 385:4781-3731). Creatinine-based eGFR may be inaccurate in  various situations including but not limited to extremes of muscle mass,  altered dietary protein intake, or medications that affect renal tubular  creatinine secretion. mL/min/1.73m2    Creatinine: 2.29 mg/dL (07.15.23 @ 09:53)  Creatinine: 2.24 mg/dL (07.14.23 @ 11:18)  Creatinine: 2.07 mg/dL (07.12.23 @ 16:17)  Previous admission  Creatinine, Serum: 1.39 mg/dL (01.04.22 @ 08:24)    < from: TTE W or WO Ultrasound Enhancing Agent (07.13.23 @ 08:06) >    FINDINGS:     Left Ventricle:  The left ventricular systolic function is normal with a calculated ejection fraction of 59 % by the Hammer's biplane method of disks. There is mild (grade 1) left ventricular diastolic dysfunction, with normal filling pressure. Analysis of left ventricular diastolic function and filling pressure is made challenging by the presence of mitral annular calcification. Severe left ventricular hypertrophy. There is increased LV mass and concentric hypertrophy. Left ventricular endocardium is not well visualized; however, the left ventricular systolic function appears grossly normal.     Right Ventricle:  Normal right ventricular cavity size, normal wall thickness and normal right ventricular systolic function. Tricuspid annular plane systolic excursion (TAPSE) is 2.1 cm (normal >=1.7 cm). Tricuspid annular tissue Doppler S' is 10.8 cm/s (normal >10 cm/s).     Left Atrium:  The left atrium is normal with an indexed volume of 33.13 ml/m².     Right Atrium:  The right atrium is normal with an indexed volume of 17.69 ml/m².     Aortic Valve:  The aortic valve appears trileaflet. There is mild calcification of the aortic valve leaflets. There is mild aortic stenosis. The peak transaortic velocity is 2.27 m/s, peak transaortic gradient is 20.6 mmHg and mean transaortic gradient is 10.0 mmHg with an LVOT/aortic valve VTI ratio of 0.40. The aortic valve acceleration time is 100 msec.    < end of copied text > EVENT: asked by RN to see pt for lethargy, and that pt's family at bedside is concern  chart reviewed, pt seen with family (daughter and spouse) at bedside  Daughter offered concern of lethargy and "she is not eating". Initially, conversation with daughter left the impression that patient did not eat at all today, however at the end of the visit reviewing the conversation, pt's daughter acknowledged that they brought food from home today and she ate "50%" of what was brought in for lunch "which is her usual amount at home" as per daughter. Spouse at bedside acknowledged he had conversation with the team "a doctor" today and discussed the lethargy which he said was present "since yesterday" but is a more today. Chart review notable for attending and consultants addressing pt's lethargy    Brief assess Pt supine in bed with head of bed elevated to ~60 degrees marked audible rhonchi, mucousy/junky sounding trache cough. she keeps her eyes closed but also her teeth tightly clenched when the RN is trying to suction her given her cough/secretions. When she did cough and RN able to get suction tube further to suction, moderate amt of thick clear secretions noted.   respiration even and unlabored  no acute distress noted  stable VS  given chart review and per d/w family at bedside, she appears to be stable from since earlier this morning  her Scr is slightly uptrending and f/s is lower than usual but this is in the setting of her last meal being lunch.  will start LR at 50cc/her for 10hrs and monitor  will hold off on dosing with her standing insulin given she has not eaten dinner and refuses to open her mouth to eat her take her meds  if BP is >160 will give IV meds PRN for tonight given she clenches her teeth tightly/clearly refuses to open her mouth  Echo reviewed with LVEF of 59% no significant valvular disease   Spouse and dtr report feeling "reassured"  will continue to monitor     Vital Signs Last 24 Hrs  T(C): 36.9 (16 Jul 2023 20:38), Max: 37.4 (16 Jul 2023 06:52)  T(F): 98.5 (16 Jul 2023 20:38), Max: 99.3 (16 Jul 2023 06:52)  HR: 68 (16 Jul 2023 20:38) (62 - 81)  BP: 142/71 (16 Jul 2023 20:38) (117/64 - 142/71)  RR: 18 (16 Jul 2023 20:38) (16 - 18)  SpO2: 98% (16 Jul 2023 20:38) (96% - 100%)    Parameters below as of 16 Jul 2023 20:38  Patient On (Oxygen Delivery Method): nasal cannula  O2 Flow (L/min): 2      Basic Metabolic Panel in AM (07.16.23 @ 09:19)    Sodium: 139 mmol/L    Potassium: 4.3 mmol/L    Chloride: 99 mmol/L    Carbon Dioxide: 29 mmol/L    Anion Gap: 11 mmol/L    Blood Urea Nitrogen: 35 mg/dL    Creatinine: 2.38 mg/dL    Glucose: 177 mg/dL    Calcium: 8.2 mg/dL    eGFR: 21: The estimated glomerular filtration rate (eGFR) is calculated using the  2021 CKD-EPI creatinine equation, which does not have a coefficient for  race and is validated in individuals 18 years of age and older (N Engl J  Med 2021; 385:6272-4227). Creatinine-based eGFR may be inaccurate in  various situations including but not limited to extremes of muscle mass,  altered dietary protein intake, or medications that affect renal tubular  creatinine secretion. mL/min/1.73m2    Creatinine: 2.29 mg/dL (07.15.23 @ 09:53)  Creatinine: 2.24 mg/dL (07.14.23 @ 11:18)  Creatinine: 2.07 mg/dL (07.12.23 @ 16:17)  Previous admission  Creatinine, Serum: 1.39 mg/dL (01.04.22 @ 08:24)    < from: TTE W or WO Ultrasound Enhancing Agent (07.13.23 @ 08:06) >    FINDINGS:     Left Ventricle:  The left ventricular systolic function is normal with a calculated ejection fraction of 59 % by the Hammer's biplane method of disks. There is mild (grade 1) left ventricular diastolic dysfunction, with normal filling pressure. Analysis of left ventricular diastolic function and filling pressure is made challenging by the presence of mitral annular calcification. Severe left ventricular hypertrophy. There is increased LV mass and concentric hypertrophy. Left ventricular endocardium is not well visualized; however, the left ventricular systolic function appears grossly normal.     Right Ventricle:  Normal right ventricular cavity size, normal wall thickness and normal right ventricular systolic function. Tricuspid annular plane systolic excursion (TAPSE) is 2.1 cm (normal >=1.7 cm). Tricuspid annular tissue Doppler S' is 10.8 cm/s (normal >10 cm/s).     Left Atrium:  The left atrium is normal with an indexed volume of 33.13 ml/m².     Right Atrium:  The right atrium is normal with an indexed volume of 17.69 ml/m².     Aortic Valve:  The aortic valve appears trileaflet. There is mild calcification of the aortic valve leaflets. There is mild aortic stenosis. The peak transaortic velocity is 2.27 m/s, peak transaortic gradient is 20.6 mmHg and mean transaortic gradient is 10.0 mmHg with an LVOT/aortic valve VTI ratio of 0.40. The aortic valve acceleration time is 100 msec.    < end of copied text > EVENT: asked by RN to see pt for lethargy, and that pt's family at bedside is concern  chart reviewed, pt seen with family (daughter and spouse) at bedside  Daughter offered concern of lethargy and "she is not eating". Initially, conversation with daughter left the impression that patient did not eat at all today, however at the end of the visit reviewing the conversation, pt's daughter acknowledged that they brought food from home today and she ate "50%" of what was brought in for lunch "which is her usual amount at home" as per daughter. Spouse at bedside acknowledged he had conversation with the team "a doctor" today and discussed the lethargy which he said was present "since yesterday" but is a more today. Chart review notable for attending and consultants addressing pt's lethargy    Brief assess Pt supine in bed with head of bed elevated to ~60 degrees marked audible rhonchi, mucousy/junky sounding trache level cough. she keeps her eyes closed but also her teeth tightly clenched when the RN is trying to suction her given her cough/secretions. When she did cough and RN able to get suction tube further to suction, moderate amt of thick clear secretions noted.   respiration even and unlabored  no acute distress noted  stable VS  given chart review and per d/w family at bedside, she appears to be stable from since earlier this morning  her Scr is slightly uptrending and f/s is lower than usual but this is in the setting of her last meal being lunch.  will start LR at 50cc/her for 10hrs and monitor  will hold off on dosing with her standing insulin given she has not eaten dinner and refuses to open her mouth to eat her take her meds  if BP is >160 will give IV meds PRN for tonight given she clenches her teeth tightly/clearly refuses to open her mouth  Echo reviewed with LVEF of 59% no significant valvular disease   Spouse and dtr report feeling "reassured"  will continue to monitor     Vital Signs Last 24 Hrs  T(C): 36.9 (16 Jul 2023 20:38), Max: 37.4 (16 Jul 2023 06:52)  T(F): 98.5 (16 Jul 2023 20:38), Max: 99.3 (16 Jul 2023 06:52)  HR: 68 (16 Jul 2023 20:38) (62 - 81)  BP: 142/71 (16 Jul 2023 20:38) (117/64 - 142/71)  RR: 18 (16 Jul 2023 20:38) (16 - 18)  SpO2: 98% (16 Jul 2023 20:38) (96% - 100%)    Parameters below as of 16 Jul 2023 20:38  Patient On (Oxygen Delivery Method): nasal cannula  O2 Flow (L/min): 2      Basic Metabolic Panel in AM (07.16.23 @ 09:19)    Sodium: 139 mmol/L    Potassium: 4.3 mmol/L    Chloride: 99 mmol/L    Carbon Dioxide: 29 mmol/L    Anion Gap: 11 mmol/L    Blood Urea Nitrogen: 35 mg/dL    Creatinine: 2.38 mg/dL    Glucose: 177 mg/dL    Calcium: 8.2 mg/dL    eGFR: 21: The estimated glomerular filtration rate (eGFR) is calculated using the  2021 CKD-EPI creatinine equation, which does not have a coefficient for  race and is validated in individuals 18 years of age and older (N Engl J  Med 2021; 385:1232-3613). Creatinine-based eGFR may be inaccurate in  various situations including but not limited to extremes of muscle mass,  altered dietary protein intake, or medications that affect renal tubular  creatinine secretion. mL/min/1.73m2    Creatinine: 2.29 mg/dL (07.15.23 @ 09:53)  Creatinine: 2.24 mg/dL (07.14.23 @ 11:18)  Creatinine: 2.07 mg/dL (07.12.23 @ 16:17)  Previous admission  Creatinine, Serum: 1.39 mg/dL (01.04.22 @ 08:24)    < from: TTE W or WO Ultrasound Enhancing Agent (07.13.23 @ 08:06) >    FINDINGS:     Left Ventricle:  The left ventricular systolic function is normal with a calculated ejection fraction of 59 % by the Hammer's biplane method of disks. There is mild (grade 1) left ventricular diastolic dysfunction, with normal filling pressure. Analysis of left ventricular diastolic function and filling pressure is made challenging by the presence of mitral annular calcification. Severe left ventricular hypertrophy. There is increased LV mass and concentric hypertrophy. Left ventricular endocardium is not well visualized; however, the left ventricular systolic function appears grossly normal.     Right Ventricle:  Normal right ventricular cavity size, normal wall thickness and normal right ventricular systolic function. Tricuspid annular plane systolic excursion (TAPSE) is 2.1 cm (normal >=1.7 cm). Tricuspid annular tissue Doppler S' is 10.8 cm/s (normal >10 cm/s).     Left Atrium:  The left atrium is normal with an indexed volume of 33.13 ml/m².     Right Atrium:  The right atrium is normal with an indexed volume of 17.69 ml/m².     Aortic Valve:  The aortic valve appears trileaflet. There is mild calcification of the aortic valve leaflets. There is mild aortic stenosis. The peak transaortic velocity is 2.27 m/s, peak transaortic gradient is 20.6 mmHg and mean transaortic gradient is 10.0 mmHg with an LVOT/aortic valve VTI ratio of 0.40. The aortic valve acceleration time is 100 msec.    < end of copied text >

## 2023-07-16 NOTE — PROGRESS NOTE ADULT - SUBJECTIVE AND OBJECTIVE BOX
Date of Service: 07-16-23 @ 13:23    Patient is a 73y old  Female who presents with a chief complaint of respiratory distress (16 Jul 2023 09:15)      Any change in ROS: she is pretty sleepy at t his time:   at  bedside:  has risk on her body :  she responds to questions but then drifts off to sleep     MEDICATIONS  (STANDING):  albuterol/ipratropium for Nebulization 3 milliLiter(s) Nebulizer every 6 hours  aspirin enteric coated 81 milliGRAM(s) Oral daily  atorvastatin 10 milliGRAM(s) Oral at bedtime  buDESOnide    Inhalation Suspension 0.5 milliGRAM(s) Inhalation every 12 hours  carvedilol 37.5 milliGRAM(s) Oral every 12 hours  chlorhexidine 2% Cloths 1 Application(s) Topical <User Schedule>  cholecalciferol 2000 Unit(s) Oral daily  cloNIDine 0.1 milliGRAM(s) Oral two times a day  dextrose 5%. 1000 milliLiter(s) (100 mL/Hr) IV Continuous <Continuous>  dextrose 5%. 1000 milliLiter(s) (50 mL/Hr) IV Continuous <Continuous>  dextrose 50% Injectable 12.5 Gram(s) IV Push once  dextrose 50% Injectable 25 Gram(s) IV Push once  dextrose 50% Injectable 25 Gram(s) IV Push once  donepezil 10 milliGRAM(s) Oral at bedtime  doxazosin 8 milliGRAM(s) Oral at bedtime  fluticasone propionate 50 MICROgram(s)/spray Nasal Spray 1 Spray(s) Both Nostrils two times a day  glucagon  Injectable 1 milliGRAM(s) IntraMuscular once  hydrocortisone 1% Cream 1 Application(s) Topical two times a day  insulin glargine Injectable (LANTUS) 21 Unit(s) SubCutaneous at bedtime  insulin lispro (ADMELOG) corrective regimen sliding scale   SubCutaneous three times a day before meals  insulin lispro Injectable (ADMELOG) 8 Unit(s) SubCutaneous three times a day before meals  letrozole 2.5 milliGRAM(s) Oral daily  Nephro-stacia 1 Tablet(s) Oral daily  piperacillin/tazobactam IVPB.. 3.375 Gram(s) IV Intermittent every 12 hours  polyethylene glycol 3350 17 Gram(s) Oral two times a day  sodium chloride 0.9% for Nebulization 3 milliLiter(s) Nebulizer every 6 hours    MEDICATIONS  (PRN):  acetaminophen     Tablet .. 650 milliGRAM(s) Oral every 6 hours PRN Temp greater or equal to 38C (100.4F), Mild Pain (1 - 3), Moderate Pain (4 - 6)  dextrose Oral Gel 15 Gram(s) Oral once PRN Blood Glucose LESS THAN 70 milliGRAM(s)/deciliter  guaiFENesin Oral Liquid (Sugar-Free) 100 milliGRAM(s) Oral every 6 hours PRN Cough    Vital Signs Last 24 Hrs  T(C): 37.2 (16 Jul 2023 11:43), Max: 37.4 (16 Jul 2023 06:52)  T(F): 99 (16 Jul 2023 11:43), Max: 99.3 (16 Jul 2023 06:52)  HR: 62 (16 Jul 2023 11:10) (62 - 81)  BP: 117/64 (16 Jul 2023 11:10) (117/64 - 166/79)  BP(mean): --  RR: 16 (16 Jul 2023 11:10) (16 - 18)  SpO2: 96% (16 Jul 2023 11:10) (96% - 100%)    Parameters below as of 16 Jul 2023 11:10  Patient On (Oxygen Delivery Method): nasal cannula  O2 Flow (L/min): 2      I&O's Summary    15 Jul 2023 07:01  -  16 Jul 2023 07:00  --------------------------------------------------------  IN: 0 mL / OUT: 1000 mL / NET: -1000 mL          Physical Exam:   GENERAL: NAD, well-groomed, well-developed  HEENT: MANDY/   Atraumatic, Normocephalic  ENMT: No tonsillar erythema, exudates, or enlargement; Moist mucous membranes, Good dentition, No lesions  NECK: Supple, No JVD, Normal thyroid  CHEST/LUNG: Clear to auscultaion- no wheezing  CVS: Regular rate and rhythm; No murmurs, rubs, or gallops  GI: : Soft, Nontender, Nondistended; Bowel sounds present  NERVOUS SYSTEM:  Lethargic : but arrousable:  respond to questions  EXTREMITIES: - edema  LYMPH: No lymphadenopathy noted  SKIN: No rashes or lesions  ENDOCRINOLOGY: No Thyromegaly  PSYCH: calm     Labs:  30                            8.8    7.78  )-----------( 176      ( 16 Jul 2023 09:19 )             30.1                         8.9    7.07  )-----------( 192      ( 15 Jul 2023 09:53 )             29.7                         9.1    6.31  )-----------( 197      ( 14 Jul 2023 11:18 )             30.2                         9.6    7.88  )-----------( 229      ( 12 Jul 2023 16:17 )             31.9     07-16    139  |  99  |  35<H>  ----------------------------<  177<H>  4.3   |  29  |  2.38<H>  07-15    140  |  100  |  37<H>  ----------------------------<  224<H>  4.6   |  30  |  2.29<H>  07-14    141  |  100  |  39<H>  ----------------------------<  204<H>  4.4   |  29  |  2.24<H>  07-12    138  |  104  |  35<H>  ----------------------------<  120<H>  5.1   |  23  |  2.07<H>    Ca    8.2<L>      16 Jul 2023 09:19  Ca    8.4      15 Jul 2023 09:53  Phos  7.2     07-15  Mg     2.3     07-15    TPro  7.8  /  Alb  2.7<L>  /  TBili  0.4  /  DBili  x   /  AST  28  /  ALT  20  /  AlkPhos  119  07-12    CAPILLARY BLOOD GLUCOSE      POCT Blood Glucose.: 101 mg/dL (16 Jul 2023 12:22)  POCT Blood Glucose.: 197 mg/dL (16 Jul 2023 07:57)  POCT Blood Glucose.: 207 mg/dL (15 Jul 2023 21:22)  POCT Blood Glucose.: 179 mg/dL (15 Jul 2023 16:37)          Urinalysis Basic - ( 16 Jul 2023 09:19 )    Color: x / Appearance: x / SG: x / pH: x  Gluc: 177 mg/dL / Ketone: x  / Bili: x / Urobili: x   Blood: x / Protein: x / Nitrite: x   Leuk Esterase: x / RBC: x / WBC x   Sq Epi: x / Non Sq Epi: x / Bacteria: x            RECENT CULTURES:  07-15 @ 09:06 .Blood Blood-Catheter       rad< from: CT Chest No Cont (07.12.23 @ 17:11) >  Chronic L1 compression deformity, unchanged since December 2021 abdominal   CT. Old sternal and bilateral rib fractures may be related to prior   cardiopulmonary resuscitation. Otherwise no aggressive osseous lesion.    IMPRESSION:  Upper lung predominant bilateral multifocal consolidation with branching   tubular opacities, suggestive of endobronchial spread of infection.   Interval right apical thick-walled cyst since 2019. TB should be   considered in the appropriate clinical setting, among other infectious   etiologies.    Small right pleural effusion.        --- End of Report ---    < end of copied text >           No growth at 24 hours    07-15 @ 09:03 .Blood Blood-Catheter                No growth at 24 hours    07-15 @ 06:38 .Sputum Sputum                  07-14 @ 21:21 .Sputum Sputum                  07-14 @ 07:25 .Sputum Sputum                Culture is being performed.          RESPIRATORY CULTURES:          Studies  Chest X-RAY  CT SCAN Chest   Venous Dopplers: LE:   CT Abdomen  Others

## 2023-07-17 NOTE — PROGRESS NOTE ADULT - ASSESSMENT
72 y/o female with multiple medical problems whom i see in my practice on outptn basis. last home visit marichuy 15   Ptn has h/o DM, HTN, breast CA, respiratory arrest and cardiac arrest (2018), CVA with residual weakness, h/o trache, PEG, both removed, now eating on her own but aspirates and has post prandial cough in addition to chronic productive cough. presumed 2/2 silent aspiration of her saliva      pt  presents to the ED with  complaining of worsening cough and orthopnea. ptn has chronic LE edema.       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 2 other specimens in lab - f/u results :  -she is lethargic: check ABG: : no sedatives:   -ABG with chr compensated hypercarbic resp failure:  no need for bipap:  not the reason fr ams: OR SLEEPINESS  RASH:   -new rash  :  -on steroids cream :   - cefepime changed to zosyn: id following  antibiotics to be finished today   acute on chronic CHF  -echo noted:   -defer to primary cards team   RUSS   -she likely has ac on chr CKD:  -she always had high creat before:   HTN  -her blood pressure is slightly had:   -cont to optimize anti hypertensives:   DM  monitor and control :    dvt prophylaxis : for dc planning

## 2023-07-17 NOTE — PROGRESS NOTE ADULT - ASSESSMENT
73F admitted 7/12/23 for  with a PMH significant for respiratory distress in the setting of possible Aspiration pneumonia and CHF exacerbation.      Latent TB, In April 2017 was provided with INH - developed rash after 6 weeks and changed to other medication  (?ifampim) for additonal 3 months as per martinez  Breast Cancer 2/17  Aspiration 2/2 CVA  DM  CHF    Previously hospitalized   March 2018 with flu, resp arrest RUSS  MSSA bacteremia  - has PEA, trach and PEG  5/12 --> 5/22/18  Hospitalized with bradycardia  - has superficial abscess on back  I&D: no grwoth  Rx: Zosyn 5/13 --> 5/16/18 12/29/21 --> 1/4/22    was treated with CIPRO/Flagyl 12/30/21 --> 1/5/22  -   daughter states that she developed CDIFF after discharge     proBNP on arrival to ED was 8075 diuresed w/ Lasix; Cardiology and Pulm following  Today  patient is ill appearing with frequent cough  7/12/23 Chest CT notable for "Upper lung predominant bilateral multifocal consolidation with branching   tubular opacities, suggestive of endobronchial spread of infection.   Interval right apical thick-walled cyst since 2019. TB should be   considered in the appropriate clinical setting"  7/14 fatigued     developed pruritic rash and somnolence  Sputum AFB  NEG x3: 7/14, 7/15, 7/16   7/15 BC x2 NGTD  7/17 aspiration noted    Antibiotics  Cipro  7/13  Flagyl  7/13  Cefepime 7/13-->7/15  Zosyn 7/26-->17    SUGGEST  discontinue Airborne precaution  monitor off antibiotics        Discussed with primary attending  Discussed with daughter at bedside

## 2023-07-17 NOTE — PHYSICAL THERAPY INITIAL EVALUATION ADULT - NSPTDISCHREC_GEN_A_CORE
Subacute rehab, however if pt to be d/c'd home would need assistance w/ all ADLs and functional mobility; home PT services for general strengthening, fall prevention, balance training, safety assessment, and to restore pt's prior level of function. DME: Hospital bed, transport WC PLD. Subacute rehab, however if pt to be d/c'd home would need assistance w/ all ADLs and functional mobility; home PT services for general strengthening, fall prevention, balance training, safety assessment, and to restore pt's prior level of function. DME: Hospital bed, transport WC w/ reclining back and elevated leg rests, PLD.

## 2023-07-17 NOTE — PROGRESS NOTE ADULT - SUBJECTIVE AND OBJECTIVE BOX
Patient is returning a call to Dr. Prince phone [phone].   Neurology Progress Note    S: Patient seen and examined.  still on airborne. daughter at bedsid.e patient more lethargic     Medication:  acetaminophen     Tablet .. 650 milliGRAM(s) Oral every 6 hours PRN  albuterol/ipratropium for Nebulization 3 milliLiter(s) Nebulizer every 6 hours  aspirin enteric coated 81 milliGRAM(s) Oral daily  atorvastatin 10 milliGRAM(s) Oral at bedtime  buDESOnide    Inhalation Suspension 0.5 milliGRAM(s) Inhalation every 12 hours  carvedilol 37.5 milliGRAM(s) Oral every 12 hours  chlorhexidine 2% Cloths 1 Application(s) Topical <User Schedule>  cholecalciferol 2000 Unit(s) Oral daily  cloNIDine 0.1 milliGRAM(s) Oral two times a day  dextrose 5%. 1000 milliLiter(s) IV Continuous <Continuous>  dextrose 5%. 1000 milliLiter(s) IV Continuous <Continuous>  dextrose 50% Injectable 25 Gram(s) IV Push once  dextrose 50% Injectable 25 Gram(s) IV Push once  dextrose 50% Injectable 12.5 Gram(s) IV Push once  dextrose Oral Gel 15 Gram(s) Oral once PRN  donepezil 10 milliGRAM(s) Oral at bedtime  doxazosin 8 milliGRAM(s) Oral at bedtime  fluticasone propionate 50 MICROgram(s)/spray Nasal Spray 1 Spray(s) Both Nostrils two times a day  glucagon  Injectable 1 milliGRAM(s) IntraMuscular once  guaiFENesin Oral Liquid (Sugar-Free) 100 milliGRAM(s) Oral every 6 hours PRN  hydrocortisone 1% Cream 1 Application(s) Topical two times a day  insulin glargine Injectable (LANTUS) 21 Unit(s) SubCutaneous at bedtime  insulin lispro (ADMELOG) corrective regimen sliding scale   SubCutaneous three times a day before meals  insulin lispro Injectable (ADMELOG) 8 Unit(s) SubCutaneous three times a day before meals  lactated ringers. 500 milliLiter(s) IV Continuous <Continuous>  letrozole 2.5 milliGRAM(s) Oral daily  Nephro-stacia 1 Tablet(s) Oral daily  piperacillin/tazobactam IVPB.. 3.375 Gram(s) IV Intermittent every 12 hours  polyethylene glycol 3350 17 Gram(s) Oral two times a day  sodium chloride 0.9% for Nebulization 3 milliLiter(s) Nebulizer every 6 hours      Vitals:  Vital Signs Last 24 Hrs  T(C): 36.9 (17 Jul 2023 05:19), Max: 37.2 (16 Jul 2023 11:43)  T(F): 98.5 (17 Jul 2023 05:19), Max: 99 (16 Jul 2023 11:43)  HR: 77 (17 Jul 2023 05:19) (62 - 77)  BP: 144/78 (17 Jul 2023 05:19) (117/64 - 160/75)  BP(mean): --  RR: 18 (17 Jul 2023 05:19) (16 - 18)  SpO2: 96% (17 Jul 2023 05:19) (96% - 98%)    Parameters below as of 17 Jul 2023 05:19  Patient On (Oxygen Delivery Method): nasal cannula  O2 Flow (L/min): 2      General Exam:   General Appearance: Appropriately dressed and in no acute distress       Head: Normocephalic, atraumatic and no dysmorphic features  Ear, Nose, and Throat: Moist mucous membranes  CVS: S1S2+  Resp: No SOB, no wheeze or rhonchi  Abd: soft NTND  Extremities: No edema, no cyanosis  Skin: + rash     Neurological Exam:    Neurological Exam:  Mental Status: Awake, alert and oriented x 1 (more lethargic 7/17.  not following commands. minimal verbal   Cranial Nerves: PERRL, EOMI, R HHA, sensation V1-V3 intact,  mild facial asymmetry, equal elevation of palate, scm/trap 5/5, tongue is midline on protrusion. no obvious papilledema on fundoscopic exam. hearing is grossly intact.   Motor: uppers 3-4/5, lowers 2-3/5 question of asterixes previously.  now not really participating [facility] exam   Sensation: Intact to light touch and pinprick throughout. no right/left confusion. no extinction to tactile on DSS.    Reflexes: 1+ throughout at biceps, brachioradialis, triceps, patellars and ankles bilaterally and equal. No clonus. R toe and L toe were both downgoing.  Coordination: No dysmetria on FNF  unable in lowers   Gait: wheelchair bound         I personally reviewed the below data/images/labs:    CBC Full  -  ( 16 Jul 2023 09:19 )  WBC Count : 7.78 K/uL  RBC Count : 3.01 M/uL  Hemoglobin : 8.8 g/dL  Hematocrit : 30.1 %  Platelet Count - Automated : 176 K/uL  Mean Cell Volume : 100.0 fl  Mean Cell Hemoglobin : 29.2 pg  Mean Cell Hemoglobin Concentration : 29.2 gm/dL  Auto Neutrophil # : x  Auto Lymphocyte # : x  Auto Monocyte # : x  Auto Eosinophil # : x  Auto Basophil # : x  Auto Neutrophil % : x  Auto Lymphocyte % : x  Auto Monocyte % : x  Auto Eosinophil % : x  Auto Basophil % : x        07-16    139  |  99  |  35<H>  ----------------------------<  177<H>  4.3   |  29  |  2.38<H>    Ca    8.2<L>      16 Jul 2023 09:19        Urinalysis Basic - ( 16 Jul 2023 09:19 )    Color: x / Appearance: x / SG: x / pH: x  Gluc: 177 mg/dL / Ketone: x  / Bili: x / Urobili: x   Blood: x / Protein: x / Nitrite: x   Leuk Esterase: x / RBC: x / WBC x   Sq Epi: x / Non Sq Epi: x / Bacteria: x

## 2023-07-17 NOTE — PROGRESS NOTE ADULT - SUBJECTIVE AND OBJECTIVE BOX
Patient is a 73y old  Female who presents with a chief complaint of respiratory distress (17 Jul 2023 19:05)      SUBJECTIVE / OVERNIGHT EVENTS: ptn has an allergic rash, will start ATARAX for it. cleared by ID to MICHELLE isolation, MICHELLE ABx, michelle planning tomorrow daughter wants home,  wants rehab, will decide in am    MEDICATIONS  (STANDING):  albuterol/ipratropium for Nebulization 3 milliLiter(s) Nebulizer every 6 hours  aspirin enteric coated 81 milliGRAM(s) Oral daily  atorvastatin 10 milliGRAM(s) Oral at bedtime  buDESOnide    Inhalation Suspension 0.5 milliGRAM(s) Inhalation every 12 hours  carvedilol 37.5 milliGRAM(s) Oral every 12 hours  chlorhexidine 2% Cloths 1 Application(s) Topical <User Schedule>  cholecalciferol 2000 Unit(s) Oral daily  cloNIDine 0.1 milliGRAM(s) Oral two times a day  dextrose 5%. 1000 milliLiter(s) (100 mL/Hr) IV Continuous <Continuous>  dextrose 5%. 1000 milliLiter(s) (50 mL/Hr) IV Continuous <Continuous>  dextrose 50% Injectable 25 Gram(s) IV Push once  dextrose 50% Injectable 12.5 Gram(s) IV Push once  dextrose 50% Injectable 25 Gram(s) IV Push once  donepezil 10 milliGRAM(s) Oral at bedtime  doxazosin 8 milliGRAM(s) Oral at bedtime  ferrous    sulfate 325 milliGRAM(s) Oral daily  fluticasone propionate 50 MICROgram(s)/spray Nasal Spray 1 Spray(s) Both Nostrils two times a day  glucagon  Injectable 1 milliGRAM(s) IntraMuscular once  hydrocortisone 1% Cream 1 Application(s) Topical two times a day  insulin glargine Injectable (LANTUS) 12 Unit(s) SubCutaneous at bedtime  insulin lispro (ADMELOG) corrective regimen sliding scale   SubCutaneous three times a day before meals  insulin lispro Injectable (ADMELOG) 3 Unit(s) SubCutaneous three times a day before meals  lactated ringers. 500 milliLiter(s) (50 mL/Hr) IV Continuous <Continuous>  letrozole 2.5 milliGRAM(s) Oral daily  Nephro-stacia 1 Tablet(s) Oral daily  polyethylene glycol 3350 17 Gram(s) Oral two times a day  sodium chloride 0.9% for Nebulization 3 milliLiter(s) Nebulizer every 6 hours    MEDICATIONS  (PRN):  acetaminophen     Tablet .. 650 milliGRAM(s) Oral every 6 hours PRN Temp greater or equal to 38C (100.4F), Mild Pain (1 - 3), Moderate Pain (4 - 6)  dextrose Oral Gel 15 Gram(s) Oral once PRN Blood Glucose LESS THAN 70 milliGRAM(s)/deciliter  guaiFENesin Oral Liquid (Sugar-Free) 100 milliGRAM(s) Oral every 6 hours PRN Cough  hydrOXYzine hydrochloride 25 milliGRAM(s) Oral every 6 hours PRN Itching      Vital Signs Last 24 Hrs  T(F): 100 (07-17-23 @ 20:30), Max: 100 (07-17-23 @ 20:30)  HR: 76 (07-17-23 @ 20:30) (68 - 79)  BP: 145/57 (07-17-23 @ 20:30) (144/78 - 171/76)  RR: 18 (07-17-23 @ 20:30) (18 - 18)  SpO2: 99% (07-17-23 @ 20:30) (96% - 99%)  Telemetry:   CAPILLARY BLOOD GLUCOSE      POCT Blood Glucose.: 147 mg/dL (17 Jul 2023 21:34)  POCT Blood Glucose.: 127 mg/dL (17 Jul 2023 16:32)  POCT Blood Glucose.: 82 mg/dL (17 Jul 2023 11:47)  POCT Blood Glucose.: 175 mg/dL (17 Jul 2023 08:02)    I&O's Summary    16 Jul 2023 07:01  -  17 Jul 2023 07:00  --------------------------------------------------------  IN: 200 mL / OUT: 150 mL / NET: 50 mL    17 Jul 2023 07:01  -  17 Jul 2023 22:44  --------------------------------------------------------  IN: 0 mL / OUT: 200 mL / NET: -200 mL        PHYSICAL EXAM:  GENERAL: NAD, well-developed  HEAD:  Atraumatic, Normocephalic  EYES: EOMI, PERRLA, conjunctiva and sclera clear  NECK: Supple, No JVD  CHEST/LUNG: Clear to auscultation bilaterally; No wheeze  HEART: Regular rate and rhythm; No murmurs, rubs, or gallops  ABDOMEN: Soft, Nontender, Nondistended; Bowel sounds present  EXTREMITIES:  2+ Peripheral Pulses, No clubbing, cyanosis, or edema  PSYCH: AAOx3  NEUROLOGY: non-focal  SKIN: No rashes or lesions    LABS:                        8.8    7.78  )-----------( 176      ( 16 Jul 2023 09:19 )             30.1     07-16    139  |  99  |  35<H>  ----------------------------<  177<H>  4.3   |  29  |  2.38<H>    Ca    8.2<L>      16 Jul 2023 09:19            Urinalysis Basic - ( 16 Jul 2023 09:19 )    Color: x / Appearance: x / SG: x / pH: x  Gluc: 177 mg/dL / Ketone: x  / Bili: x / Urobili: x   Blood: x / Protein: x / Nitrite: x   Leuk Esterase: x / RBC: x / WBC x   Sq Epi: x / Non Sq Epi: x / Bacteria: x        RADIOLOGY & ADDITIONAL TESTS:    Imaging Personally Reviewed:    Consultant(s) Notes Reviewed:      Care Discussed with Consultants/Other Providers:

## 2023-07-17 NOTE — PROGRESS NOTE ADULT - ASSESSMENT
72 y/o female with multiple medical problems whom i see in my practice on outptn basis. last home visit marichuy 15   Ptn has h/o DM, HTN, breast CA, respiratory arrest and cardiac arrest (2018), CVA with residual weakness, h/o trache, PEG, both removed, now eating on her own but aspirates and has post prandial cough in addition to chronic productive cough. presumed 2/2 silent aspiration of her saliva this was d/w Dr. Jose Almendarez who is in agreement    Today ptn presents to the ED with  complaining of worsening cough and orthopnea. ptn has chronic LE edema.   Denies any headache, fever, chills, chest pain, abdominal pain, n/v/d, dysuria. Unable to obtain any history or ROS due to mental status.    A/P  7/12: Aspiration PNA, cannot R/O BRYAN. check sputum culture, acute on chronic diastolic chf, , chronic aspiration  ptn has multiple ABx allergies, start Cipro and flagyl which she tolerated in the past  cont outptn meds, get S&S eval  insulin on a sliding scale, RUSS, check bladder sono, renal sono  card, renal, pulm, endo ID called  cont outptn meds  7/13: ptn is awake, alert, daughter at bedside. daughter [facility], wants me to cont seeing her on outptn at home and her but also wants to cont w [facility] out[facility]. i explained to her she needs to pick a pcp, she cannot have me and [facility] in the hospital. she wants to stay with me, if her father changes his mind, she will let me know.   Ptn looks better today, more communicative, smiling, eating rice pudding, coughing after each bite. awaitng S&S, will need MBS. didnt tolerate FEEST in the past. ptn is hungry. she used to have a trache and a PEG post CVA, was removed few years ago. HTN is not controlled coreg raised to 37.5 bid. ptn seen by Renal, Scr at baseline, started on Farxiga and will start Losartan 25 mg    7/14: MBS done: recs are small soft bite food w mildly thickened liquids. renal, card, ID, pulm, endo input appreciated. Ptn is in isolation, being ruled out for active TB. 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 now on CEFEPIME, sputum for TB ordered. ptn is lethargic, BP improved, daughter feels its 2/2 CLONIDINE. as per renal start LOSARTAN. i started LOSARTAN yesterday, but it was DCed. lethargy could be due to overdiuresis. remains on LASIX 40 mg q12H. family wants the ptn to go home . awaiting ID clearance prior to DC. daughter c/o LE weakness and inability to ambulate, neuro called    7/15: ptn is on ABx for PNA, being worked up for TB, seen by Neuro. no immediate interventions planned. cont present meds    7/16:  ptn is lethargic, has a rash, daughter is concerned its a reaction to the ABx, nonpruritic. daughter is refusing to start LOSARTAN. discussed its renal protective in ptns w DM. green sputum resolved, now its clear-yellow    7/17: ptn has an allergic rash, will start ATARAX for it. cleared by ID to DC isolation, DC ABx, dc planning tomorrow daughter wants home,  wants rehab, will decide in am                  dvt ppx w [facility]

## 2023-07-17 NOTE — PHYSICAL THERAPY INITIAL EVALUATION ADULT - ADDITIONAL COMMENTS
Pt lives in a pvt home has ramp access and chair lift, pt required assist w/ all ADLs and functional mobility at baseline, family assists as needed, pts daughter is her CDPAP. As per spouse at bedside, pt was able to assist w/ bed<>wc transfers.

## 2023-07-17 NOTE — PROGRESS NOTE ADULT - SUBJECTIVE AND OBJECTIVE BOX
Follow Up:  bronchiectasis    Interval History/ROS:  pruritic rash, occasional cough    Allergies  hydrALAZINE (Rash)  vitamin E (Short breath; Urticaria; Hives)  NIFEdipine (Urticaria; Hives)  doxycycline (Rash)  nafcillin (Unknown)  isoniazid (Rash)    ANTIMICROBIALS:      OTHER MEDS:  MEDICATIONS  (STANDING):  acetaminophen     Tablet .. 650 every 6 hours PRN  albuterol/ipratropium for Nebulization 3 every 6 hours  aspirin enteric coated 81 daily  atorvastatin 10 at bedtime  buDESOnide    Inhalation Suspension 0.5 every 12 hours  carvedilol 37.5 every 12 hours  cloNIDine 0.1 two times a day  dextrose 50% Injectable 25 once  dextrose 50% Injectable 12.5 once  dextrose 50% Injectable 25 once  dextrose Oral Gel 15 once PRN  donepezil 10 at bedtime  doxazosin 8 at bedtime  glucagon  Injectable 1 once  guaiFENesin Oral Liquid (Sugar-Free) 100 every 6 hours PRN  hydrOXYzine hydrochloride 25 every 6 hours PRN  insulin glargine Injectable (LANTUS) 12 at bedtime  insulin lispro (ADMELOG) corrective regimen sliding scale  three times a day before meals  insulin lispro Injectable (ADMELOG) 3 three times a day before meals  letrozole 2.5 daily  polyethylene glycol 3350 17 two times a day  sodium chloride 0.9% for Nebulization 3 every 6 hours      Vital Signs Last 24 Hrs  T(C): 36.9 (17 Jul 2023 05:19), Max: 36.9 (16 Jul 2023 20:38)  T(F): 98.5 (17 Jul 2023 05:19), Max: 98.5 (16 Jul 2023 20:38)  HR: 79 (17 Jul 2023 17:00) (68 - 79)  BP: 171/76 (17 Jul 2023 17:00) (142/71 - 171/76)  BP(mean): --  RR: 18 (17 Jul 2023 17:00) (18 - 18)  SpO2: 97% (17 Jul 2023 17:00) (96% - 98%)    Parameters below as of 17 Jul 2023 17:00  Patient On (Oxygen Delivery Method): nasal cannula  O2 Flow (L/min): 2      PHYSICAL EXAM:  General:  NAD, Non-toxic  Neurology: A&Ox3, nonfocal  Respiratory: Clear to auscultation bilaterally  CV: RRR, S1S2, no murmurs, rubs or gallops  Abdominal: Soft, Non-tender, non-distended,  Extremities: No edema  Line Sites: Clear  Skin: macular pap pruritic rash                          8.8    7.78  )-----------( 176      ( 16 Jul 2023 09:19 )             30.1   WBC Count: 7.78 (07-16 @ 09:19)  WBC Count: 7.07 (07-15 @ 09:53)  WBC Count: 6.31 (07-14 @ 11:18)    07-16    139  |  99  |  35<H>  ----------------------------<  177<H>  4.3   |  29  |  2.38<H>  Creatinine: 2.38 (07-16 @ 09:19)    Creatinine: 2.29 (07-15 @ 09:53)    Creatinine: 2.24 (07-14 @ 11:18)      Ca    8.2<L>      16 Jul 2023 09:19    Urinalysis Basic - ( 16 Jul 2023 09:19 )    Color: x / Appearance: x / SG: x / pH: x  Gluc: 177 mg/dL / Ketone: x  / Bili: x / Urobili: x   Blood: x / Protein: x / Nitrite: x   Leuk Esterase: x / RBC: x / WBC x   Sq Epi: x / Non Sq Epi: x / Bacteria: x        MICROBIOLOGY:  .Sputum Sputum  07-16-23 --  --  --      .Blood Blood-Catheter  07-15-23   No growth at 48 Hours  --  --      .Blood Blood-Catheter  07-15-23   No growth at 48 Hours  --  --      .Sputum Sputum  07-15-23 --  --  --      .Sputum Sputum  07-14-23 --  --  --      .Sputum Sputum  07-14-23   Culture is being performed.  --  --    Rapid RVP Result: NotDetec (07-12 @ 16:17)    RADIOLOGY:  < from: Xray Knee 4 Views, Left (07.17.23 @ 11:02) >  IMPRESSION: Generalized bone demineralization is noted. There is no gross   knee joint effusion. There is severe medial tibiofemoral compartment   osteoarthrosis. There is moderate spurring at the quadriceps insertion.   Marked arterial calcification is noted.    < end of copied text >  < from: Xray Cinesophagram Swallow Function w/ Contrast (07.14.23 @ 18:30) >  FINDINGS/  IMPRESSION:    There is evidence of penetration and aspiration with the tested   consistencies.    < end of copied text >  < from: US Kidney and Bladder (07.12.23 @ 22:59) >  IMPRESSION:    No hydronephrosis. Additional findings as described.    < end of copied text >  < from: CT Chest No Cont (07.12.23 @ 17:11) >  FINDINGS:  Motion limited study.    LUNGS, AIRWAYS AND PLEURA: The central airways are patent with tracheal   and segmental/subsegmental bronchial mucous secretions. There is   multifocal consolidation in bilateral upper lobes, right middle lobe and   lingula with branchingtubular opacities. While finding was present in   2019, previously seen areas of consolidation have resolved and there are   new areas of involvement on the current study. A thick-walled cyst at the   right apex has evolved since 2019, having overalldecreased in size with   increased wall thickening. Small right pleural effusion. No pneumothorax.    MEDIASTINUM AND JUSTINO: Redemonstrated nonspecific prominent multifocal   mediastinal lymph nodes.    HEART/vessels: Heart size is normal. No pericardial effusion. Coronary   arterial and mitral annular calcification. The great vessels are normal   in size.    VISUALIZED UPPER ABDOMEN: This artery calcifications. The kidneys are   partially atrophic.    BONES/SOFT TISSUES: Bilateral mastectomies. Cardiac loop recorder.   Chronic L1 compression deformity, unchanged since December 2021 abdominal   CT. Old sternal and bilateral rib fractures may be related to prior   cardiopulmonary resuscitation. Otherwise no aggressive osseous lesion.    IMPRESSION:  Upper lung predominant bilateral multifocal consolidation with branching   tubular opacities, suggestive of endobronchial spread of infection.   Interval right apical thick-walled cyst since 2019. TB should be   considered in the appropriate clinical setting, among other infectious   etiologies.    Small right pleural effusion.    < end of copied text >      Jas Durand MD; Division of Infectious Disease; Pager: [phone]; nights and weekends: [phone]

## 2023-07-17 NOTE — PHYSICAL THERAPY INITIAL EVALUATION ADULT - ORIENTATION, REHAB EVAL
Assessment & Plan     Other fatigue  Reviewed symptoms   Reassurance given   Labs as below as she is worried/anxious.   Work on hydration  F/u with PCP this summer.   - CBC with platelets differential  - Basic metabolic panel             Staff Will notify of results on phone when available     Return in about 3 months (around 8/17/2021) for Routine Visit, regular primary provider.    Mouna Chamberlain PA-C  [facility] Paulino is a 89 year old who presents for the following health issues  accompanied by her Friend:    HPI     Dizziness  Onset/Duration: one time about a week ago  Description:   Do you feel faint: No,  Felt weird starting at belly and flooding upward.- A few seconds.   Does it feel like the surroundings (bed, room) are moving: no  Unsteady/off balance: not applicable  Have you passed out or fallen: no  Intensity: mild  Progression of Symptoms: only happened once  Accompanying Signs & Symptoms:  Heart palpitations or chest pain: no  Nausea, vomiting: no  Weakness or lack of coordination in arms or legs: not applicable  Vision or speech changes: no  Numbness or tingling: no  Ringing in ears (Tinnitus): no  Hearing Loss: no  History:   Head trauma/concussion history: no  Previous similar symptoms: no  Recent bleeding history: no  Any new medications (BP?): no  Precipitating factors:   Worse with activity: no  Worse with head movement: no  Alleviating factors:   Does staying in a fixed position give relief:   Therapies tried and outcome: None  Low energy. Lately  Worried about this. Though no reoccurrence  Some hx of low sodium in the past, wondering if she is dehydrated. -   Last Comprehensive Metabolic Panel:  Sodium   Date Value Ref Range Status   08/03/2020 138 133 - 144 mmol/L Final     Potassium   Date Value Ref Range Status   08/03/2020 3.8 3.4 - 5.3 mmol/L Final     Chloride   Date Value Ref Range Status   08/03/2020 103 94 - 109 mmol/L Final      Carbon Dioxide   Date Value Ref Range Status   08/03/2020 27 20 - 32 mmol/L Final     Anion Gap   Date Value Ref Range Status   08/03/2020 8 3 - 14 mmol/L Final     Glucose   Date Value Ref Range Status   08/03/2020 132 (H) 70 - 99 mg/dL Final     Comment:     Fasting specimen     Urea Nitrogen   Date Value Ref Range Status   08/03/2020 11 7 - 30 mg/dL Final     Creatinine   Date Value Ref Range Status   08/03/2020 0.80 0.52 - 1.04 mg/dL Final     GFR Estimate   Date Value Ref Range Status   08/03/2020 63 >60 mL/min/[1.73_m2] Final     Comment:     Non  GFR Calc  Starting 12/18/2018, serum creatinine based estimated GFR (eGFR) will be   calculated using the Chronic Kidney Disease Epidemiology Collaboration   (CKD-EPI) equation.       Calcium   Date Value Ref Range Status   08/03/2020 8.6 8.5 - 10.1 mg/dL Final           Review of Systems   Constitutional, HEENT, cardiovascular, pulmonary, gi and gu systems are negative, except as otherwise noted.      Objective    /74   Pulse 82   Resp 18   Wt 82.9 kg (182 lb 12.8 oz)   LMP  (LMP Unknown)   SpO2 96%   BMI 33.43 kg/m    Body mass index is 33.43 kg/m .  Physical Exam   GENERAL: healthy, alert and no distress  NECK: no adenopathy, no asymmetry, masses, or scars and thyroid normal to palpation  ENT-external canals slight cerumen, no impaction, TM grey no retractions   RESP: lungs clear to auscultation - no rales, rhonchi or wheezes  CV: regular rates and rhythm and normal S1 S2, no S3 or S4  MS: no gross musculoskeletal defects noted, no edema  SKIN: no suspicious lesions or rashes  NEURO: Normal strength and tone, mentation intact and speech normal                 person

## 2023-07-17 NOTE — PROGRESS NOTE ADULT - SUBJECTIVE AND OBJECTIVE BOX
Chief complaint  Patient is a 73y old  Female who presents with a chief complaint of respiratory distress (17 Jul 2023 09:52)         Labs and Fingersticks  CAPILLARY BLOOD GLUCOSE      POCT Blood Glucose.: 82 mg/dL (17 Jul 2023 11:47)  POCT Blood Glucose.: 175 mg/dL (17 Jul 2023 08:02)  POCT Blood Glucose.: 113 mg/dL (16 Jul 2023 21:25)  POCT Blood Glucose.: 111 mg/dL (16 Jul 2023 16:49)  POCT Blood Glucose.: 101 mg/dL (16 Jul 2023 12:22)      Anion Gap: 11 (07-16 @ 09:19)      Calcium: 8.2 *L* (07-16 @ 09:19)          07-16    139  |  99  |  35<H>  ----------------------------<  177<H>  4.3   |  29  |  2.38<H>    Ca    8.2<L>      16 Jul 2023 09:19                          8.8    7.78  )-----------( 176      ( 16 Jul 2023 09:19 )             30.1     Medications  MEDICATIONS  (STANDING):  albuterol/ipratropium for Nebulization 3 milliLiter(s) Nebulizer every 6 hours  aspirin enteric coated 81 milliGRAM(s) Oral daily  atorvastatin 10 milliGRAM(s) Oral at bedtime  buDESOnide    Inhalation Suspension 0.5 milliGRAM(s) Inhalation every 12 hours  carvedilol 37.5 milliGRAM(s) Oral every 12 hours  chlorhexidine 2% Cloths 1 Application(s) Topical <User Schedule>  cholecalciferol 2000 Unit(s) Oral daily  cloNIDine 0.1 milliGRAM(s) Oral two times a day  dextrose 5%. 1000 milliLiter(s) (50 mL/Hr) IV Continuous <Continuous>  dextrose 5%. 1000 milliLiter(s) (100 mL/Hr) IV Continuous <Continuous>  dextrose 50% Injectable 25 Gram(s) IV Push once  dextrose 50% Injectable 12.5 Gram(s) IV Push once  dextrose 50% Injectable 25 Gram(s) IV Push once  donepezil 10 milliGRAM(s) Oral at bedtime  doxazosin 8 milliGRAM(s) Oral at bedtime  fluticasone propionate 50 MICROgram(s)/spray Nasal Spray 1 Spray(s) Both Nostrils two times a day  glucagon  Injectable 1 milliGRAM(s) IntraMuscular once  hydrocortisone 1% Cream 1 Application(s) Topical two times a day  insulin glargine Injectable (LANTUS) 12 Unit(s) SubCutaneous at bedtime  insulin lispro (ADMELOG) corrective regimen sliding scale   SubCutaneous three times a day before meals  insulin lispro Injectable (ADMELOG) 3 Unit(s) SubCutaneous three times a day before meals  lactated ringers. 500 milliLiter(s) (50 mL/Hr) IV Continuous <Continuous>  letrozole 2.5 milliGRAM(s) Oral daily  Nephro-stacia 1 Tablet(s) Oral daily  polyethylene glycol 3350 17 Gram(s) Oral two times a day  sodium chloride 0.9% for Nebulization 3 milliLiter(s) Nebulizer every 6 hours      Physical Exam  General: Patient comfortable in bed   Vital Signs Last 12 Hrs  T(F): 98.5 (07-17-23 @ 05:19), Max: 98.5 (07-17-23 @ 05:19)  HR: 77 (07-17-23 @ 05:19) (73 - 77)  BP: 144/78 (07-17-23 @ 05:19) (144/78 - 160/75)  BP(mean): --  RR: 18 (07-17-23 @ 05:19) (18 - 18)  SpO2: 96% (07-17-23 @ 05:19) (96% - 96%)    CVS: S1S2   Respiratory: No wheezing, no crepitations  GI: Abdomen soft, bowel sounds positive  Musculoskeletal:  moves all extremities  : Voiding        Chief complaint  Patient is a 73y old  Female who presents with a chief complaint of respiratory distress (17 Jul 2023 09:52)         Labs and Fingersticks  CAPILLARY BLOOD GLUCOSE      POCT Blood Glucose.: 82 mg/dL (17 Jul 2023 11:47)  POCT Blood Glucose.: 175 mg/dL (17 Jul 2023 08:02)  POCT Blood Glucose.: 113 mg/dL (16 Jul 2023 21:25)  POCT Blood Glucose.: 111 mg/dL (16 Jul 2023 16:49)  POCT Blood Glucose.: 101 mg/dL (16 Jul 2023 12:22)      Anion Gap: 11 (07-16 @ 09:19)      Calcium: 8.2 *L* (07-16 @ 09:19)          07-16    139  |  99  |  35<H>  ----------------------------<  177<H>  4.3   |  29  |  2.38<H>    Ca    8.2<L>      16 Jul 2023 09:19                          8.8    7.78  )-----------( 176      ( 16 Jul 2023 09:19 )             30.1     Medications  MEDICATIONS  (STANDING):  albuterol/ipratropium for Nebulization 3 milliLiter(s) Nebulizer every 6 hours  aspirin enteric coated 81 milliGRAM(s) Oral daily  atorvastatin 10 milliGRAM(s) Oral at bedtime  buDESOnide    Inhalation Suspension 0.5 milliGRAM(s) Inhalation every 12 hours  carvedilol 37.5 milliGRAM(s) Oral every 12 hours  chlorhexidine 2% Cloths 1 Application(s) Topical <User Schedule>  cholecalciferol 2000 Unit(s) Oral daily  cloNIDine 0.1 milliGRAM(s) Oral two times a day  dextrose 5%. 1000 milliLiter(s) (50 mL/Hr) IV Continuous <Continuous>  dextrose 5%. 1000 milliLiter(s) (100 mL/Hr) IV Continuous <Continuous>  dextrose 50% Injectable 25 Gram(s) IV Push once  dextrose 50% Injectable 12.5 Gram(s) IV Push once  dextrose 50% Injectable 25 Gram(s) IV Push once  donepezil 10 milliGRAM(s) Oral at bedtime  doxazosin 8 milliGRAM(s) Oral at bedtime  fluticasone propionate 50 MICROgram(s)/spray Nasal Spray 1 Spray(s) Both Nostrils two times a day  glucagon  Injectable 1 milliGRAM(s) IntraMuscular once  hydrocortisone 1% Cream 1 Application(s) Topical two times a day  insulin glargine Injectable (LANTUS) 12 Unit(s) SubCutaneous at bedtime  insulin lispro (ADMELOG) corrective regimen sliding scale   SubCutaneous three times a day before meals  insulin lispro Injectable (ADMELOG) 3 Unit(s) SubCutaneous three times a day before meals  lactated ringers. 500 milliLiter(s) (50 mL/Hr) IV Continuous <Continuous>  letrozole 2.5 milliGRAM(s) Oral daily  Nephro-stacia 1 Tablet(s) Oral daily  polyethylene glycol 3350 17 Gram(s) Oral two times a day  sodium chloride 0.9% for Nebulization 3 milliLiter(s) Nebulizer every 6 hours      Physical Exam  General: Patient comfortable in bed   Vital Signs Last 12 Hrs  T(F): 98.5 (07-17-23 @ 05:19), Max: 98.5 (07-17-23 @ 05:19)  HR: 77 (07-17-23 @ 05:19) (73 - 77)  BP: 144/78 (07-17-23 @ 05:19) (144/78 - 160/75)  BP(mean): --  RR: 18 (07-17-23 @ 05:19) (18 - 18)  SpO2: 96% (07-17-23 @ 05:19) (96% - 96%)    CVS: S1S2   Respiratory: No wheezing, no crepitations  GI: Abdomen soft, bowel sounds positive  Musculoskeletal:  moves all extremities  : Voiding

## 2023-07-17 NOTE — PROGRESS NOTE ADULT - ASSESSMENT
74 y/o female with DM, HTN, breast CA, respiratory arrest and cardiac arrest (2018), L PCA stroke 2018 s/p ILR, h/o trache, PEG, both removed, now eating on her own but aspirates and has post prandial cough in addition to chronic productive cough with c/f silent aspiration.      was walking with walker until ~Oct 2022 per daughter now wheelchair bound.   spoke iwth daughter at bedside who states for stroke she follows with house neurology Dr. Ramiro Garner and Renae Vargas for stroke as well as movement dr. Lance Vizcaino.   MRI brain 2018 B/L centrum semi ovale watershed infarcts   o/e AAOx1-2, mild dysarthira, RHHA follows simple commands, uppers 3-4/5 lowers 2-3/5, question of asterixes in uppers  states had MRI at [facility] radiology 4/2023 which was unchanged from prior imaging  NIHSS ~18 premrs 4  + RUSS   TTE done 7/13 7/17, more lethargic today.  c/f possible aspiration ; cefepime changed to zosyn. check CTH     Impression:   1) L PCA stroke, ESUS s/p ILR ; also with bilateral centrum semiovale watershed infarcts   2) weakness likely multifactorial, deconditioning, LE edema, anemia, RUSS, prior stroke and cardiopulmnoary arrest, prior strokes now possible new infection/aspiration   3) Dementia     -  check CTH but no new focal neuro changes since she had MRI in april , CTH ordered   - c/w asa 81mg daily and statin therapy, atorvastating 10mg PO QHS for secondary stroke prevention  - treatment of infection/aspiration per primary team. was on cefepime ; concern for drug rash, changed to zosyn.      - for dementia she is on donepezil 10mg PO QHS  - can check b12, RPR, TSH for reversible causes of dementia   - on airborne precuations to r/o TB    - Hemoglobin A1c and lipid panel  - telemetry  - PT/OT/SS/SLP, OOBC  - check FS, glucose control <180  - GI/DVT ppx  - Differential diagnosis and plan of care discussed with patient and/or family and primary team  - Thank you for allowing me to participate in the care of this patient. Call with questions.   - spoke with daughter at bedside , all questions answered    follows with house neurology Dr. Ramiro Garner and Renae Vargas for stroke as well as movement dr. Lance Vizcaino.   - spoke with daughter at bedside 7/15 and 7/17   Anoop Bianchi MD  Vascular Neurology  Office: [phone]

## 2023-07-17 NOTE — PROGRESS NOTE ADULT - SUBJECTIVE AND OBJECTIVE BOX
Subjective: Patient seen and examined. No new events except as noted.   daughter at bedside   states shes more lethargic   right sided facial rash   to me , shes arousable to voice and minimally communicative     REVIEW OF SYSTEMS:    CONSTITUTIONAL: N+weakness, fevers or chills  EYES/ENT: No visual changes;  No vertigo or throat pain   NECK: No pain or stiffness  RESPIRATORY: No cough, wheezing, hemoptysis; No shortness of breath  CARDIOVASCULAR: No chest pain or palpitations  GASTROINTESTINAL: No abdominal or epigastric pain. No nausea, vomiting, or hematemesis; No diarrhea or constipation. No melena or hematochezia.  GENITOURINARY: No dysuria, frequency or hematuria  NEUROLOGICAL: No numbness or weakness  SKIN: No itching, burning, rashes, or lesions   All other review of systems is negative unless indicated above.    MEDICATIONS:  MEDICATIONS  (STANDING):  albuterol/ipratropium for Nebulization 3 milliLiter(s) Nebulizer every 6 hours  aspirin enteric coated 81 milliGRAM(s) Oral daily  atorvastatin 10 milliGRAM(s) Oral at bedtime  buDESOnide    Inhalation Suspension 0.5 milliGRAM(s) Inhalation every 12 hours  carvedilol 37.5 milliGRAM(s) Oral every 12 hours  chlorhexidine 2% Cloths 1 Application(s) Topical <User Schedule>  cholecalciferol 2000 Unit(s) Oral daily  cloNIDine 0.1 milliGRAM(s) Oral two times a day  dextrose 5%. 1000 milliLiter(s) (100 mL/Hr) IV Continuous <Continuous>  dextrose 5%. 1000 milliLiter(s) (50 mL/Hr) IV Continuous <Continuous>  dextrose 50% Injectable 25 Gram(s) IV Push once  dextrose 50% Injectable 12.5 Gram(s) IV Push once  dextrose 50% Injectable 25 Gram(s) IV Push once  donepezil 10 milliGRAM(s) Oral at bedtime  doxazosin 8 milliGRAM(s) Oral at bedtime  fluticasone propionate 50 MICROgram(s)/spray Nasal Spray 1 Spray(s) Both Nostrils two times a day  glucagon  Injectable 1 milliGRAM(s) IntraMuscular once  hydrocortisone 1% Cream 1 Application(s) Topical two times a day  insulin glargine Injectable (LANTUS) 21 Unit(s) SubCutaneous at bedtime  insulin lispro (ADMELOG) corrective regimen sliding scale   SubCutaneous three times a day before meals  insulin lispro Injectable (ADMELOG) 8 Unit(s) SubCutaneous three times a day before meals  lactated ringers. 500 milliLiter(s) (50 mL/Hr) IV Continuous <Continuous>  letrozole 2.5 milliGRAM(s) Oral daily  Nephro-stacia 1 Tablet(s) Oral daily  piperacillin/tazobactam IVPB.. 3.375 Gram(s) IV Intermittent every 12 hours  polyethylene glycol 3350 17 Gram(s) Oral two times a day  sodium chloride 0.9% for Nebulization 3 milliLiter(s) Nebulizer every 6 hours      PHYSICAL EXAM:  T(C): 36.9 (07-17-23 @ 05:19), Max: 37.2 (07-16-23 @ 11:43)  HR: 77 (07-17-23 @ 05:19) (62 - 77)  BP: 144/78 (07-17-23 @ 05:19) (117/64 - 160/75)  RR: 18 (07-17-23 @ 05:19) (16 - 18)  SpO2: 96% (07-17-23 @ 05:19) (96% - 98%)  Wt(kg): --  I&O's Summary    16 Jul 2023 07:01  -  17 Jul 2023 07:00  --------------------------------------------------------  IN: 200 mL / OUT: 150 mL / NET: 50 mL          Appearance: NAD  HEENT:   Dry oral mucosa, PERRL, EOMI	  Lymphatic: No lymphadenopathy , no edema  Cardiovascular: Normal S1 S2, No JVD, No murmurs , Peripheral pulses palpable 2+ bilaterally  Respiratory: decreased bs 	  Gastrointestinal:  Soft, Non-tender, + BS	  Skin: right facial patchy rash   Musculoskeletal: Normal range of motion, normal strength  Psychiatry:  Mood & affect appropriate  Ext: No edema      LABS:    CARDIAC MARKERS:                                8.8    7.78  )-----------( 176      ( 16 Jul 2023 09:19 )             30.1     07-16    139  |  99  |  35<H>  ----------------------------<  177<H>  4.3   |  29  |  2.38<H>    Ca    8.2<L>      16 Jul 2023 09:19  Phos  7.2     07-15  Mg     2.3     07-15      proBNP:   Lipid Profile:   HgA1c:   TSH:     Blood Gas Profile - Arterial (07.16.23 @ 17:17)   pH, Arterial: 7.38: No collection time indicated, please interpret with caution  pCO2, Arterial: 53 mmHg  pO2, Arterial: 121 mmHg  HCO3, Arterial: 31 mmol/L  Base Excess, Arterial: 4.9 mmol/L  Oxygen Saturation, Arterial: 100.0 %  Total CO2, Arterial: 33 mmol/L  FIO2, Arterial: 28        TELEMETRY: 	  SR   ECG:  	  RADIOLOGY:   DIAGNOSTIC TESTING:  [ ] Echocardiogram:  [ ]  Catheterization:  [ ] Stress Test:    OTHER:

## 2023-07-17 NOTE — PROGRESS NOTE ADULT - ASSESSMENT
73F female h/o DM, HTN, breast CA, respiratory arrest and cardiac arrest (2018), CVA with residual weakness, h/o trache, PEG, being worked up for asp PNA.   Assessment  DMT2: 73y Female with DM T2 with hyperglycemia, A1C pending, was on oral meds and MIXED insulin at home, now on basal bolus insulin with coverage, blood sugars now down trending, not eating full meals.   Asp PNA: ?chronic aspiration, sputum cultures. Aspiration precautions.   HTN: on antihypertensive medications, monitored, asymptomatic.  CKD: Monitor labs/BMP, renal dosing.     Discussed plan and management wit Dr Naresh Bales NP - TEAMS  Viraj Infante MD  Cell: [phone]  Office: [phone]             73F female h/o DM, HTN, breast CA, respiratory arrest and cardiac arrest (2018), CVA with residual weakness, h/o trache, PEG, being worked up for asp PNA.   Assessment  DMT2: 73y Female with DM T2 with hyperglycemia, A1C pending, was on oral meds and MIXED insulin at home, now on basal bolus insulin with coverage, blood sugars now down  trending, not eating full meals.   Asp PNA: ?chronic aspiration, sputum cultures. Aspiration precautions.   HTN: on antihypertensive medications, monitored, asymptomatic.  CKD: Monitor labs/BMP, renal dosing.     Discussed plan and management wit Dr Naresh Bales NP - TEAMS  Viraj Infante MD  Cell: [phone]  Office: [phone]             73F female h/o DM, HTN, breast CA, respiratory arrest and cardiac arrest (2018), CVA with residual weakness, h/o trache, PEG, being worked up for asp PNA.   Assessment  DMT2: 73y Female with DM T2 with hyperglycemia, A1C pending, was on oral meds and MIXED insulin at home, now on basal bolus insulin with coverage, blood sugars now down trending, not eating full meals.   Asp PNA: ?chronic aspiration, sputum cultures. Aspiration precautions.   HTN: on antihypertensive medications, monitored, asymptomatic.  CKD: Monitor labs/BMP, renal dosing.     Discussed plan and management wit Dr Naresh Bales NP - TEAMS  Viraj Infante MD  Cell: [phone]  Office: [phone]

## 2023-07-17 NOTE — PROGRESS NOTE ADULT - SUBJECTIVE AND OBJECTIVE BOX
Date of Service: 07-17-23 @ 14:19    Patient is a 73y old  Female who presents with a chief complaint of respiratory distress (17 Jul 2023 13:06)      Any change in ROS: she looks OK today  : more awake and alert and responding to questions:  her daughter is  at bedside:       MEDICATIONS  (STANDING):  albuterol/ipratropium for Nebulization 3 milliLiter(s) Nebulizer every 6 hours  aspirin enteric coated 81 milliGRAM(s) Oral daily  atorvastatin 10 milliGRAM(s) Oral at bedtime  buDESOnide    Inhalation Suspension 0.5 milliGRAM(s) Inhalation every 12 hours  carvedilol 37.5 milliGRAM(s) Oral every 12 hours  chlorhexidine 2% Cloths 1 Application(s) Topical <User Schedule>  cholecalciferol 2000 Unit(s) Oral daily  cloNIDine 0.1 milliGRAM(s) Oral two times a day  dextrose 5%. 1000 milliLiter(s) (100 mL/Hr) IV Continuous <Continuous>  dextrose 5%. 1000 milliLiter(s) (50 mL/Hr) IV Continuous <Continuous>  dextrose 50% Injectable 25 Gram(s) IV Push once  dextrose 50% Injectable 25 Gram(s) IV Push once  dextrose 50% Injectable 12.5 Gram(s) IV Push once  donepezil 10 milliGRAM(s) Oral at bedtime  doxazosin 8 milliGRAM(s) Oral at bedtime  ferrous    sulfate 325 milliGRAM(s) Oral daily  fluticasone propionate 50 MICROgram(s)/spray Nasal Spray 1 Spray(s) Both Nostrils two times a day  glucagon  Injectable 1 milliGRAM(s) IntraMuscular once  hydrocortisone 1% Cream 1 Application(s) Topical two times a day  insulin glargine Injectable (LANTUS) 12 Unit(s) SubCutaneous at bedtime  insulin lispro (ADMELOG) corrective regimen sliding scale   SubCutaneous three times a day before meals  insulin lispro Injectable (ADMELOG) 3 Unit(s) SubCutaneous three times a day before meals  lactated ringers. 500 milliLiter(s) (50 mL/Hr) IV Continuous <Continuous>  letrozole 2.5 milliGRAM(s) Oral daily  Nephro-stacia 1 Tablet(s) Oral daily  polyethylene glycol 3350 17 Gram(s) Oral two times a day  sodium chloride 0.9% for Nebulization 3 milliLiter(s) Nebulizer every 6 hours    MEDICATIONS  (PRN):  acetaminophen     Tablet .. 650 milliGRAM(s) Oral every 6 hours PRN Temp greater or equal to 38C (100.4F), Mild Pain (1 - 3), Moderate Pain (4 - 6)  dextrose Oral Gel 15 Gram(s) Oral once PRN Blood Glucose LESS THAN 70 milliGRAM(s)/deciliter  guaiFENesin Oral Liquid (Sugar-Free) 100 milliGRAM(s) Oral every 6 hours PRN Cough  hydrOXYzine hydrochloride 25 milliGRAM(s) Oral every 6 hours PRN Itching    Vital Signs Last 24 Hrs  T(C): 36.9 (17 Jul 2023 05:19), Max: 36.9 (16 Jul 2023 20:38)  T(F): 98.5 (17 Jul 2023 05:19), Max: 98.5 (16 Jul 2023 20:38)  HR: 77 (17 Jul 2023 05:19) (68 - 77)  BP: 144/78 (17 Jul 2023 05:19) (142/71 - 160/75)  BP(mean): --  RR: 18 (17 Jul 2023 05:19) (18 - 18)  SpO2: 96% (17 Jul 2023 05:19) (96% - 98%)    Parameters below as of 17 Jul 2023 05:19  Patient On (Oxygen Delivery Method): nasal cannula  O2 Flow (L/min): 2      I&O's Summary    16 Jul 2023 07:01  -  17 Jul 2023 07:00  --------------------------------------------------------  IN: 200 mL / OUT: 150 mL / NET: 50 mL    17 Jul 2023 07:01  -  17 Jul 2023 14:19  --------------------------------------------------------  IN: 0 mL / OUT: 200 mL / NET: -200 mL          Physical Exam:   GENERAL: NAD, well-groomed, well-developed  HEENT: MANDY/   Atraumatic, Normocephalic  ENMT: No tonsillar erythema, exudates, or enlargement; Moist mucous membranes, Good dentition, No lesions  NECK: Supple, No JVD, Normal thyroid  CHEST/LUNG: Clear to auscultaion  CVS: Regular rate and rhythm; No murmurs, rubs, or gallops  GI: : Soft, Nontender, Nondistended; Bowel sounds present  NERVOUS SYSTEM:  Alert & Oriented X3  EXTREMITIES: - edema  LYMPH: No lymphadenopathy noted  SKIN: No rashes or lesions  ENDOCRINOLOGY: No Thyromegaly  PSYCH: Appropriate    Labs:  ABG - ( 16 Jul 2023 17:17 )  pH, Arterial: 7.38  pH, Blood: x     /  pCO2: 53    /  pO2: 121   / HCO3: 31    / Base Excess: 4.9   /  SaO2: 100.0           30                            8.8    7.78  )-----------( 176      ( 16 Jul 2023 09:19 )             30.1                         8.9    7.07  )-----------( 192      ( 15 Jul 2023 09:53 )             29.7                         9.1    6.31  )-----------( 197      ( 14 Jul 2023 11:18 )             30.2     07-16    139  |  99  |  35<H>  ----------------------------<  177<H>  4.3   |  29  |  2.38<H>  07-15    140  |  100  |  37<H>  ----------------------------<  224<H>  4.6   |  30  |  2.29<H>  07-14    141  |  100  |  39<H>  ----------------------------<  204<H>  4.4   |  29  |  2.24<H>    Ca    8.2<L>      16 Jul 2023 09:19      CAPILLARY BLOOD GLUCOSE      POCT Blood Glucose.: 82 mg/dL (17 Jul 2023 11:47)  POCT Blood Glucose.: 175 mg/dL (17 Jul 2023 08:02)  POCT Blood Glucose.: 113 mg/dL (16 Jul 2023 21:25)  POCT Blood Glucose.: 111 mg/dL (16 Jul 2023 16:49)          Urinalysis Basic - ( 16 Jul 2023 09:19 )    Color: x / Appearance: x / SG: x / pH: x  Gluc: 177 mg/dL / Ketone: x  / Bili: x / Urobili: x   Blood: x / Protein: x / Nitrite: x   Leuk Esterase: x / RBC: x / WBC x   Sq Epi: x / Non Sq Epi: x / Bacteria: x            RECENT CULTURES:  07-16 @ 12:01 .Sputum Sputum       < from: Xray Cinesophagram Swallow Function w/ Contrast (07.14.23 @ 18:30) >  INTERPRETATION:  CLINICAL INFORMATION: Dysphagia.    TECHNIQUE: A cine esophagogram was performed under fluoroscopy in   conjunction with speech pathology.    Time: 225.5 seconds    FINDINGS/  IMPRESSION:    There is evidence of penetration and aspiration with the tested   consistencies.    For further information and recommendations, please refer to the speech   pathologist final report which is available for review in the electronic   medical record.    --- End of Report ---           KATIE MCNEILL MD; Resident Radiologist  This document has been electronically signed.  KHALIDA JENSEN MD; Attending Interventional Radiologist  This document has been electronically signed. Jul 17 2023  9:08AM    < end of copied text >  < from: Xray Cinesophagram Swallow Function w/ Contrast (07.14.23 @ 18:30) >  INTERPRETATION:  CLINICAL INFORMATION: Dysphagia.    TECHNIQUE: A cine esophagogram was performed under fluoroscopy in   conjunction with speech pathology.    Time: 225.5 seconds    FINDINGS/  IMPRESSION:    There is evidence of penetration and aspiration with the tested   consistencies.    For further information and recommendations, please refer to the speech   pathologist final report which is available for review in the electronic   medical record.    --- End of Report ---           KATIE MCNEILL MD; Resident Radiologist  This document has been electronically signed.  KHALIDA JENSEN MD; Attending Interventional Radiologist  This document has been electronically signed. Jul 17 2023  9:08AM    < end of copied text >             07-15 @ 09:06 .Blood Blood-Catheter                No growth at 48 Hours    07-15 @ 09:03 .Blood Blood-Catheter                No growth at 48 Hours    07-15 @ 06:38 .Sputum Sputum                  07-14 @ 21:21 .Sputum Sputum                  07-14 @ 07:25 .Sputum Sputum                Culture is being performed.          RESPIRATORY CULTURES:          Studies  Chest X-RAY  CT SCAN Chest   Venous Dopplers: LE:   CT Abdomen  Others

## 2023-07-17 NOTE — PROGRESS NOTE ADULT - SUBJECTIVE AND OBJECTIVE BOX
Overnight events noted      VITAL:  T(C): , Max: 36.9 (07-16-23 @ 20:38)  T(F): , Max: 98.5 (07-16-23 @ 20:38)  HR: 77 (07-17-23 @ 05:19)  BP: 144/78 (07-17-23 @ 05:19)  BP(mean): --  RR: 18 (07-17-23 @ 05:19)  SpO2: 96% (07-17-23 @ 05:19)  Wt(kg): --      PHYSICAL EXAM:  Constitutional: lethargic, NAD  HEENT: NCAT, DMM  Neck: Supple, No JVD  Respiratory: CTA-b/l  Cardiovascular: RRR s1s2, no m/r/g  Gastrointestinal: BS+, soft, NT/ND  Extremities: No peripheral edema b/l  Neurological: no focal deficits; strength grossly intact  Back: no CVAT b/l  Skin: No rashes, no nevi      LABS:                        8.8    7.78  )-----------( 176      ( 16 Jul 2023 09:19 )             30.1     Na(139)/K(4.3)/Cl(99)/HCO3(29)/BUN(35)/Cr(2.38)Glu(177)/Ca(8.2)/Mg(--)/PO4(--)    07-16 @ 09:19  Na(140)/K(4.6)/Cl(100)/HCO3(30)/BUN(37)/Cr(2.29)Glu(224)/Ca(8.4)/Mg(2.3)/PO4(7.2)    07-15 @ 09:53    Urinalysis Basic - ( 16 Jul 2023 09:19 )  Color: x / Appearance: x / SG: x / pH: x  Gluc: 177 mg/dL / Ketone: x  / Bili: x / Urobili: x   Blood: x / Protein: x / Nitrite: x   Leuk Esterase: x / RBC: x / WBC x   Sq Epi: x / Non Sq Epi: x / Bacteria: x    (7/15/23) - Iron 22, TSat 10%    AFB negative x 4      IMPRESSION: 73F w/ HTN, DM2, CVA, breast CA-b/l mastectomy, and CAC-trach/reversal, 7/12/23 p/w cough/sob    (1)Renal - CKD4 with nephrotic-range proteinuria - highly likely from diabetic nephropathy.     (2)CV - stage 2 hypertension - acceptably controlled for now    (3)Lytes - acceptable    (4)Anemia - iron deficiency    (5)Cavitary lung lesion - ruled out for TB        RECOMMEND:  (1)Add FeSO4 325mg po qd  (2)Antihypertensives as ordered  (3)Dose new meds for GFR 20-30ml/min  (4)If for discharge, would have her f/u with Nephrology Dr. Cem Neely in 1-4 weeks          Jimmy Colon MD  [facility]  Office/on call physician: [phone]  Cell (7a-7p): [phone]       no complaints  family at bedside - share that she was confused last night but improved today      VITAL:  T(C): , Max: 36.9 (07-16-23 @ 20:38)  T(F): , Max: 98.5 (07-16-23 @ 20:38)  HR: 77 (07-17-23 @ 05:19)  BP: 144/78 (07-17-23 @ 05:19)  BP(mean): --  RR: 18 (07-17-23 @ 05:19)  SpO2: 96% (07-17-23 @ 05:19)  Wt(kg): --      PHYSICAL EXAM:  Constitutional: lethargic, NAD  HEENT: NCAT, DMM  Neck: Supple, No JVD  Respiratory: CTA-b/l  Cardiovascular: RRR s1s2, no m/r/g  Gastrointestinal: BS+, soft, NT/ND  Extremities: No peripheral edema b/l  Neurological: no focal deficits; strength grossly intact  Back: no CVAT b/l  Skin: No rashes, no nevi      LABS:                        8.8    7.78  )-----------( 176      ( 16 Jul 2023 09:19 )             30.1     Na(139)/K(4.3)/Cl(99)/HCO3(29)/BUN(35)/Cr(2.38)Glu(177)/Ca(8.2)/Mg(--)/PO4(--)    07-16 @ 09:19  Na(140)/K(4.6)/Cl(100)/HCO3(30)/BUN(37)/Cr(2.29)Glu(224)/Ca(8.4)/Mg(2.3)/PO4(7.2)    07-15 @ 09:53    Urinalysis Basic - ( 16 Jul 2023 09:19 )  Color: x / Appearance: x / SG: x / pH: x  Gluc: 177 mg/dL / Ketone: x  / Bili: x / Urobili: x   Blood: x / Protein: x / Nitrite: x   Leuk Esterase: x / RBC: x / WBC x   Sq Epi: x / Non Sq Epi: x / Bacteria: x    (7/15/23) - Iron 22, TSat 10%    AFB negative x 4      IMPRESSION: 73F w/ HTN, DM2, CVA, breast CA-b/l mastectomy, and CAC-trach/reversal, 7/12/23 p/w cough/sob    (1)Renal - CKD4 with nephrotic-range proteinuria - highly likely from diabetic nephropathy.     (2)CV - stage 2 hypertension - acceptably controlled for now    (3)Lytes - acceptable    (4)Anemia - iron deficiency    (5)Cavitary lung lesion - ruled out for TB        RECOMMEND:  (1)Add FeSO4 325mg po qd  (2)Antihypertensives as ordered  (3)Dose new meds for GFR 20-30ml/min  (4)If for discharge, would have her f/u with Nephrology Dr. Cem Neely in 1-4 weeks          Jimmy Colon MD  [facility]  Office/on call physician: [phone]  Cell (7a-7p): [phone]

## 2023-07-17 NOTE — PHYSICAL THERAPY INITIAL EVALUATION ADULT - PERTINENT HX OF CURRENT PROBLEM, REHAB EVAL
72 y/o female with DM, HTN, breast CA, respiratory arrest and cardiac arrest (2018), L PCA stroke 2018 s/p ILR, h/o trache, PEG, both removed, now eating on her own but aspirates and has post prandial cough in addition to chronic productive cough with c/f silent aspiration. Pt also has BLE swelling.

## 2023-07-18 NOTE — PROGRESS NOTE ADULT - SUBJECTIVE AND OBJECTIVE BOX
Neurology Progress Note    S: Patient seen and examined.  still on airborne. daughter and  at bedside ; mental status better     Medication:  MEDICATIONS  (STANDING):  albuterol/ipratropium for Nebulization 3 milliLiter(s) Nebulizer every 6 hours  aspirin enteric coated 81 milliGRAM(s) Oral daily  atorvastatin 10 milliGRAM(s) Oral at bedtime  buDESOnide    Inhalation Suspension 0.5 milliGRAM(s) Inhalation every 12 hours  carvedilol 37.5 milliGRAM(s) Oral every 12 hours  chlorhexidine 2% Cloths 1 Application(s) Topical <User Schedule>  cholecalciferol 2000 Unit(s) Oral daily  cloNIDine 0.1 milliGRAM(s) Oral two times a day  dextrose 5%. 1000 milliLiter(s) (100 mL/Hr) IV Continuous <Continuous>  dextrose 5%. 1000 milliLiter(s) (50 mL/Hr) IV Continuous <Continuous>  dextrose 50% Injectable 25 Gram(s) IV Push once  dextrose 50% Injectable 25 Gram(s) IV Push once  dextrose 50% Injectable 12.5 Gram(s) IV Push once  donepezil 10 milliGRAM(s) Oral at bedtime  doxazosin 8 milliGRAM(s) Oral at bedtime  ferrous    sulfate 325 milliGRAM(s) Oral daily  fluticasone propionate 50 MICROgram(s)/spray Nasal Spray 1 Spray(s) Both Nostrils two times a day  glucagon  Injectable 1 milliGRAM(s) IntraMuscular once  hydrocortisone 1% Cream 1 Application(s) Topical two times a day  insulin glargine Injectable (LANTUS) 12 Unit(s) SubCutaneous at bedtime  insulin lispro (ADMELOG) corrective regimen sliding scale   SubCutaneous three times a day before meals  insulin lispro Injectable (ADMELOG) 3 Unit(s) SubCutaneous three times a day before meals  lactated ringers. 500 milliLiter(s) (50 mL/Hr) IV Continuous <Continuous>  letrozole 2.5 milliGRAM(s) Oral daily  Nephro-stacia 1 Tablet(s) Oral daily  polyethylene glycol 3350 17 Gram(s) Oral two times a day  sodium chloride 0.9% for Nebulization 3 milliLiter(s) Nebulizer every 6 hours    MEDICATIONS  (PRN):  acetaminophen     Tablet .. 650 milliGRAM(s) Oral every 6 hours PRN Temp greater or equal to 38C (100.4F), Mild Pain (1 - 3), Moderate Pain (4 - 6)  dextrose Oral Gel 15 Gram(s) Oral once PRN Blood Glucose LESS THAN 70 milliGRAM(s)/deciliter  guaiFENesin Oral Liquid (Sugar-Free) 100 milliGRAM(s) Oral every 6 hours PRN Cough  hydrOXYzine hydrochloride 25 milliGRAM(s) Oral every 6 hours PRN Itching        Vitals:    Orthostatic VS  Vital Signs Last 24 Hrs  T(C): 36.6 (07-18-23 @ 04:57), Max: 37.8 (07-17-23 @ 20:30)  T(F): 97.9 (07-18-23 @ 04:57), Max: 100 (07-17-23 @ 20:30)  HR: 78 (07-18-23 @ 04:57) (68 - 79)  BP: 154/61 (07-18-23 @ 04:57) (145/57 - 171/76)  BP(mean): --  RR: 18 (07-18-23 @ 04:57) (18 - 18)  SpO2: 98% (07-18-23 @ 04:57) (97% - 99%)          General Exam:   General Appearance: Appropriately dressed and in no acute distress       Head: Normocephalic, atraumatic and no dysmorphic features  Ear, Nose, and Throat: Moist mucous membranes  CVS: S1S2+  Resp: No SOB, no wheeze or rhonchi  Abd: soft NTND  Extremities: No edema, no cyanosis  Skin: + rash     Neurological Exam:    Neurological Exam:  Mental Status: Awake, alert and oriented x 1 (more lethargic 7/17.  not following commands. minimal verbal   Cranial Nerves: PERRL, EOMI, R HHA, sensation V1-V3 intact,  mild facial asymmetry, equal elevation of palate, scm/trap 5/5, tongue is midline on protrusion. no obvious papilledema on fundoscopic exam. hearing is grossly intact.   Motor: uppers 3-4/5, lowers 2-3/5 question of asterixes previously.  now not really participating [facility] exam   Sensation: Intact to light touch and pinprick throughout. no right/left confusion. no extinction to tactile on DSS.    Reflexes: 1+ throughout at biceps, brachioradialis, triceps, patellars and ankles bilaterally and equal. No clonus. R toe and L toe were both downgoing.  Coordination: No dysmetria on FNF  unable in lowers   Gait: wheelchair bound         I personally reviewed the below data/images/labs:   no new labs     Urinalysis Basic - ( 16 Jul 2023 09:19 )    Color: x / Appearance: x / SG: x / pH: x  Gluc: 177 mg/dL / Ketone: x  / Bili: x / Urobili: x   Blood: x / Protein: x / Nitrite: x   Leuk Esterase: x / RBC: x / WBC x   Sq Epi: x / Non Sq Epi: x / Bacteria: x      < from: CT Head No Cont (07.17.23 @ 22:44) >    ACC: [id number] EXAM:  CT BRAIN   ORDERED BY: CUONG WILLIAMSON     PROCEDURE DATE:  07/17/2023          INTERPRETATION:  .    CLINICAL INFORMATION: Altered mental status.    TECHNIQUE: Multiple axial CT images of the head were obtained without   contrast. Sagittal and coronal reconstructed images were acquired from   the source data.    COMPARISON: Prior brain MRI study from 2/6/2018. Prior CT study of the   head from 2/3/2018.    FINDINGS: Encephalomalacia and gliosis is seen within the left occipital   lobe is chronic infarction.    There is no acute intracranial hemorrhage, mass effect, shift of the   midline structures, herniation, hydrocephalus, or abnormal extra axial   fluid collection.    There is diffuse cerebral volume loss with prominence of the sulci,   fissures, and cisternal spaces which is normal for the patient's age.   There is mild periventricular white matter hypoattenuation statistically   compatible with microvascular type changes given calcific atherosclerotic   disease of the intracranial arteries.    The paranasal sinuses and right mastoid cavity is clear. There is under   pneumatization and sclerosis of the left hip in the mastoid cavity. Soft   tissue is seen within the left middle ear.    There is evidenceof bilateral cataract removal.    Multiple rounded lucencies in the skull are nonspecific.    IMPRESSION: No acute intracranial findings.    Chronic left occipital lobe infarct an similar-appearing mild chronic   white matter microvascular type changes.    --- End of Report ---            RAMÓN VIGIL MD; Attending Radiologist  This document has been electronically signed. Jul 18 2023  8:00AM    < end of copied text >

## 2023-07-18 NOTE — PROGRESS NOTE ADULT - SUBJECTIVE AND OBJECTIVE BOX
Follow Up:  rash    Interval History/ROS:  lehargy, occasional cough   and daughter at bedside    Allergies  hydrALAZINE (Rash)  vitamin E (Short breath; Urticaria; Hives)  NIFEdipine (Urticaria; Hives)  doxycycline (Rash)  nafcillin (Unknown)  isoniazid (Rash)    ANTIMICROBIALS:      OTHER MEDS:  MEDICATIONS  (STANDING):  acetaminophen     Tablet .. 650 every 6 hours PRN  albuterol/ipratropium for Nebulization 3 every 6 hours  aspirin enteric coated 81 daily  atorvastatin 10 at bedtime  buDESOnide    Inhalation Suspension 0.5 every 12 hours  carvedilol 37.5 every 12 hours  cloNIDine 0.1 two times a day  dextrose 50% Injectable 25 once  dextrose 50% Injectable 25 once  dextrose 50% Injectable 12.5 once  dextrose Oral Gel 15 once PRN  donepezil 10 at bedtime  doxazosin 8 at bedtime  glucagon  Injectable 1 once  guaiFENesin Oral Liquid (Sugar-Free) 100 every 6 hours PRN  hydrOXYzine hydrochloride 25 every 6 hours PRN  insulin glargine Injectable (LANTUS) 12 at bedtime  insulin lispro (ADMELOG) corrective regimen sliding scale  three times a day before meals  insulin lispro Injectable (ADMELOG) 3 three times a day before meals  letrozole 2.5 daily  methylPREDNISolone sodium succinate Injectable 40 every 12 hours  polyethylene glycol 3350 17 two times a day  sodium chloride 0.9% for Nebulization 3 every 6 hours      Vital Signs Last 24 Hrs  T(C): 36.9 (18 Jul 2023 13:04), Max: 37.8 (17 Jul 2023 20:30)  T(F): 98.4 (18 Jul 2023 13:04), Max: 100 (17 Jul 2023 20:30)  HR: 79 (18 Jul 2023 13:04) (76 - 79)  BP: 155/73 (18 Jul 2023 13:04) (145/57 - 155/73)  BP(mean): --  RR: 18 (18 Jul 2023 13:04) (18 - 18)  SpO2: 97% (18 Jul 2023 13:04) (97% - 99%)    Parameters below as of 18 Jul 2023 13:04  Patient On (Oxygen Delivery Method): nasal cannula  O2 Flow (L/min): 2      PHYSICAL EXAM:  General: WN/WD NAD, Non-toxic  Neurology: A&Ox3, nonfocal  Respiratory: Clear to auscultation bilaterally  CV: RRR, S1S2, no murmurs, rubs or gallops  Abdominal: Soft, Non-tender, non-distended, normal bowel sounds  Extremities: No edema, + peripheral pulses  Line Sites: Clear  Skin: No rash                          9.6    12.95 )-----------( 198      ( 18 Jul 2023 11:51 )             32.5       07-18    141  |  102  |  44<H>  ----------------------------<  132<H>  3.9   |  27  |  2.74<H>    Ca    8.6      18 Jul 2023 11:51        Urinalysis Basic - ( 18 Jul 2023 11:51 )    Color: x / Appearance: x / SG: x / pH: x  Gluc: 132 mg/dL / Ketone: x  / Bili: x / Urobili: x   Blood: x / Protein: x / Nitrite: x   Leuk Esterase: x / RBC: x / WBC x   Sq Epi: x / Non Sq Epi: x / Bacteria: x        MICROBIOLOGY:  .Sputum Sputum  07-16-23 --  --  --      .Blood Blood-Catheter  07-15-23   No growth at 72 Hours  --  --      .Blood Blood-Catheter  07-15-23   No growth at 72 Hours  --  --      .Sputum Sputum  07-15-23 --  --  --      .Sputum Sputum  07-14-23 --  --  --      .Sputum Sputum  07-14-23   Culture is being performed.  --  --    Rapid RVP Result: NotDetec (07-12 @ 16:17)    RADIOLOGY:  < from: CT Head No Cont (07.17.23 @ 22:44) >  IMPRESSION: No acute intracranial findings.    Chronic left occipital lobe infarct an similar-appearing mild chronic   white matter microvascular type changes.    < end of copied text >  < from: Xray Cinesophagram Swallow Function w/ Contrast (07.14.23 @ 18:30) >  FINDINGS/  IMPRESSION:    There is evidence of penetration and aspiration with the tested   consistencies.    < end of copied text >  < from: CT Chest No Cont (07.12.23 @ 17:11) >  IMPRESSION:  Upper lung predominant bilateral multifocal consolidation with branching   tubular opacities, suggestive of endobronchial spread of infection.   Interval right apical thick-walled cyst since 2019. TB should be   considered in the appropriate clinical setting, among other infectious   etiologies.    < end of copied text >      Jas Durand MD; Division of Infectious Disease; Pager: [phone]; nights and weekends: [phone]

## 2023-07-18 NOTE — PROGRESS NOTE ADULT - SUBJECTIVE AND OBJECTIVE BOX
Date of Service: 07-18-23 @ 16:24    Patient is a 73y old  Female who presents with a chief complaint of respiratory distress (18 Jul 2023 16:02)      Any change in ROS: he seems OK: no sob:     MEDICATIONS  (STANDING):  albuterol/ipratropium for Nebulization 3 milliLiter(s) Nebulizer every 6 hours  aspirin enteric coated 81 milliGRAM(s) Oral daily  atorvastatin 10 milliGRAM(s) Oral at bedtime  buDESOnide    Inhalation Suspension 0.5 milliGRAM(s) Inhalation every 12 hours  carvedilol 37.5 milliGRAM(s) Oral every 12 hours  chlorhexidine 2% Cloths 1 Application(s) Topical <User Schedule>  cholecalciferol 2000 Unit(s) Oral daily  cloNIDine 0.1 milliGRAM(s) Oral two times a day  dextrose 5%. 1000 milliLiter(s) (100 mL/Hr) IV Continuous <Continuous>  dextrose 5%. 1000 milliLiter(s) (50 mL/Hr) IV Continuous <Continuous>  dextrose 50% Injectable 25 Gram(s) IV Push once  dextrose 50% Injectable 12.5 Gram(s) IV Push once  dextrose 50% Injectable 25 Gram(s) IV Push once  donepezil 10 milliGRAM(s) Oral at bedtime  doxazosin 8 milliGRAM(s) Oral at bedtime  ferrous    sulfate 325 milliGRAM(s) Oral daily  fluticasone propionate 50 MICROgram(s)/spray Nasal Spray 1 Spray(s) Both Nostrils two times a day  glucagon  Injectable 1 milliGRAM(s) IntraMuscular once  hydrocortisone 1% Cream 1 Application(s) Topical two times a day  insulin glargine Injectable (LANTUS) 12 Unit(s) SubCutaneous at bedtime  insulin lispro (ADMELOG) corrective regimen sliding scale   SubCutaneous three times a day before meals  insulin lispro Injectable (ADMELOG) 3 Unit(s) SubCutaneous three times a day before meals  letrozole 2.5 milliGRAM(s) Oral daily  Nephro-stacia 1 Tablet(s) Oral daily  polyethylene glycol 3350 17 Gram(s) Oral two times a day  sodium chloride 0.9% for Nebulization 3 milliLiter(s) Nebulizer every 6 hours  sodium chloride 0.9%. 1000 milliLiter(s) (100 mL/Hr) IV Continuous <Continuous>    MEDICATIONS  (PRN):  acetaminophen     Tablet .. 650 milliGRAM(s) Oral every 6 hours PRN Temp greater or equal to 38C (100.4F), Mild Pain (1 - 3), Moderate Pain (4 - 6)  dextrose Oral Gel 15 Gram(s) Oral once PRN Blood Glucose LESS THAN 70 milliGRAM(s)/deciliter  guaiFENesin Oral Liquid (Sugar-Free) 100 milliGRAM(s) Oral every 6 hours PRN Cough  hydrOXYzine hydrochloride 25 milliGRAM(s) Oral every 6 hours PRN Itching    Vital Signs Last 24 Hrs  T(C): 36.6 (18 Jul 2023 04:57), Max: 37.8 (17 Jul 2023 20:30)  T(F): 97.9 (18 Jul 2023 04:57), Max: 100 (17 Jul 2023 20:30)  HR: 78 (18 Jul 2023 04:57) (76 - 79)  BP: 154/61 (18 Jul 2023 04:57) (145/57 - 171/76)  BP(mean): --  RR: 18 (18 Jul 2023 04:57) (18 - 18)  SpO2: 98% (18 Jul 2023 04:57) (97% - 99%)    Parameters below as of 18 Jul 2023 04:57  Patient On (Oxygen Delivery Method): nasal cannula  O2 Flow (L/min): 2      I&O's Summary    17 Jul 2023 07:01  -  18 Jul 2023 07:00  --------------------------------------------------------  IN: 0 mL / OUT: 375 mL / NET: -375 mL    18 Jul 2023 07:01  -  18 Jul 2023 16:24  --------------------------------------------------------  IN: 360 mL / OUT: 0 mL / NET: 360 mL          Physical Exam:   GENERAL: NAD, well-groomed, well-developed  HEENT: MANDY/   Atraumatic, Normocephalic  ENMT: No tonsillar erythema, exudates, or enlargement; Moist mucous membranes, Good dentition, No lesions  NECK: Supple, No JVD, Normal thyroid  CHEST/LUNG: Clear to auscultaion  CVS: Regular rate and rhythm; No murmurs, rubs, or gallops  GI: : Soft, Nontender, Nondistended; Bowel sounds present  NERVOUS SYSTEM:  Alert & Oriented X3  EXTREMITIES:  2+ Peripheral Pulses, No clubbing, cyanosis, or edema  LYMPH: No lymphadenopathy noted  SKIN: rash + i tchiness  ENDOCRINOLOGY: No Thyromegaly  PSYCH: Appropriate    Labs:  ABG - ( 16 Jul 2023 17:17 )  pH, Arterial: 7.38  pH, Blood: x     /  pCO2: 53    /  pO2: 121   / HCO3: 31    / Base Excess: 4.9   /  SaO2: 100.0           30                            9.6    12.95 )-----------( 198      ( 18 Jul 2023 11:51 )             32.5                         8.8    7.78  )-----------( 176      ( 16 Jul 2023 09:19 )             30.1                         8.9    7.07  )-----------( 192      ( 15 Jul 2023 09:53 )             29.7     07-18    141  |  102  |  44<H>  ----------------------------<  132<H>  3.9   |  27  |  2.74<H>  07-16    139  |  99  |  35<H>  ----------------------------<  177<H>  4.3   |  29  |  2.38<H>  07-15    140  |  100  |  37<H>  ----------------------------<  224<H>  4.6   |  30  |  2.29<H>    Ca    8.6      18 Jul 2023 11:51      CAPILLARY BLOOD GLUCOSE      POCT Blood Glucose.: 138 mg/dL (18 Jul 2023 16:01)  POCT Blood Glucose.: 139 mg/dL (18 Jul 2023 11:23)  POCT Blood Glucose.: 164 mg/dL (18 Jul 2023 08:09)  POCT Blood Glucose.: 147 mg/dL (17 Jul 2023 21:34)  POCT Blood Glucose.: 127 mg/dL (17 Jul 2023 16:32)          Urinalysis Basic - ( 18 Jul 2023 11:51 )    Color: x / Appearance: x / SG: x / pH: x  Gluc: 132 mg/dL / Ketone: x  / Bili: x / Urobili: x   Blood: x / Protein: x / Nitrite: x   Leuk Esterase: x / RBC: x / WBC x   Sq Epi: x / Non Sq Epi: x / Bacteria: x            RECENT CULTURES:  07-16 @ 12:01 .Sputum Sputum         rad< from: CT Head No Cont (07.17.23 @ 22:44) >  fissures, and cisternal spaces which is normal for the patient's age.   There is mild periventricular white matter hypoattenuation statistically   compatible with microvascular type changes given calcific atherosclerotic   disease of the intracranial arteries.    The paranasal sinuses and right mastoid cavity is clear. There is under   pneumatization and sclerosis of the left hip in the mastoid cavity. Soft   tissue is seen within the left middle ear.    There is evidenceof bilateral cataract removal.    Multiple rounded lucencies in the skull are nonspecific.    IMPRESSION: No acute intracranial findings.    Chronic left occipital lobe infarct an similar-appearing mild chronic   white matter microvascular type changes.    --- End of Report ---            RAMÓN VIGIL MD; Attending Radiologist  This document has been electronically signed. Jul 18 2023  8:00AM    < end of copied text >           07-15 @ 09:06 .Blood Blood-Catheter                No growth at 72 Hours    07-15 @ 09:03 .Blood Blood-Catheter                No growth at 72 Hours    07-15 @ 06:38 .Sputum Sputum                  07-14 @ 21:21 .Sputum Sputum                  07-14 @ 07:25 .Sputum Sputum                Culture is being performed.          RESPIRATORY CULTURES:          Studies  Chest X-RAY  CT SCAN Chest   Venous Dopplers: LE:   CT Abdomen  Others

## 2023-07-18 NOTE — PROGRESS NOTE ADULT - ASSESSMENT
73F female h/o DM, HTN, breast CA, respiratory arrest and cardiac arrest (2018), CVA with residual weakness, h/o trache, PEG, being worked up for asp PNA.   Assessment  DMT2: 73y Female with DM T2 with hyperglycemia, A1C pending, was on oral meds and MIXED insulin at home, now on basal bolus insulin with coverage, blood sugars now down trending, not eating consistent full meals.   Asp PNA: ?chronic aspiration, sputum cultures, abx. Aspiration precautions.   HTN: on antihypertensive medications, monitored, asymptomatic.  CKD: Monitor labs/BMP, renal dosing.     Discussed plan and management wit Dr Naresh Bales NP - TEAMS  Viraj Infante MD  Cell: [phone]  Office: [phone]             73F female h/o DM, HTN, breast CA, respiratory arrest and cardiac arrest (2018), CVA with residual weakness, h/o trache, PEG, being worked up for asp PNA.   Assessment  DMT2: 73y Female with DM T2 with hyperglycemia, A1C pending, was on oral meds and MIXED insulin at home, now on basal bolus insulin with coverage, blood sugars now down trending,  not eating consistent full meals.   Asp PNA: ?chronic aspiration, sputum cultures, abx. Aspiration precautions.   HTN: on antihypertensive medications, monitored, asymptomatic.  CKD: Monitor labs/BMP, renal dosing.     Discussed plan and management wit Dr Naresh Bales NP - TEAMS  Viraj Infante MD  Cell: [phone]  Office: [phone]

## 2023-07-18 NOTE — PROGRESS NOTE ADULT - SUBJECTIVE AND OBJECTIVE BOX
Chief complaint  Patient is a 73y old  Female who presents with a chief complaint of respiratory distress (17 Jul 2023 22:43)         Labs and Fingersticks  CAPILLARY BLOOD GLUCOSE      POCT Blood Glucose.: 164 mg/dL (18 Jul 2023 08:09)  POCT Blood Glucose.: 147 mg/dL (17 Jul 2023 21:34)  POCT Blood Glucose.: 127 mg/dL (17 Jul 2023 16:32)  POCT Blood Glucose.: 82 mg/dL (17 Jul 2023 11:47)      Anion Gap: 11 (07-16 @ 09:19)      Calcium: 8.2 *L* (07-16 @ 09:19)          07-16    139  |  99  |  35<H>  ----------------------------<  177<H>  4.3   |  29  |  2.38<H>    Ca    8.2<L>      16 Jul 2023 09:19                          8.8    7.78  )-----------( 176      ( 16 Jul 2023 09:19 )             30.1     Medications  MEDICATIONS  (STANDING):  albuterol/ipratropium for Nebulization 3 milliLiter(s) Nebulizer every 6 hours  aspirin enteric coated 81 milliGRAM(s) Oral daily  atorvastatin 10 milliGRAM(s) Oral at bedtime  buDESOnide    Inhalation Suspension 0.5 milliGRAM(s) Inhalation every 12 hours  carvedilol 37.5 milliGRAM(s) Oral every 12 hours  chlorhexidine 2% Cloths 1 Application(s) Topical <User Schedule>  cholecalciferol 2000 Unit(s) Oral daily  cloNIDine 0.1 milliGRAM(s) Oral two times a day  dextrose 5%. 1000 milliLiter(s) (100 mL/Hr) IV Continuous <Continuous>  dextrose 5%. 1000 milliLiter(s) (50 mL/Hr) IV Continuous <Continuous>  dextrose 50% Injectable 25 Gram(s) IV Push once  dextrose 50% Injectable 25 Gram(s) IV Push once  dextrose 50% Injectable 12.5 Gram(s) IV Push once  donepezil 10 milliGRAM(s) Oral at bedtime  doxazosin 8 milliGRAM(s) Oral at bedtime  ferrous    sulfate 325 milliGRAM(s) Oral daily  fluticasone propionate 50 MICROgram(s)/spray Nasal Spray 1 Spray(s) Both Nostrils two times a day  glucagon  Injectable 1 milliGRAM(s) IntraMuscular once  hydrocortisone 1% Cream 1 Application(s) Topical two times a day  insulin glargine Injectable (LANTUS) 12 Unit(s) SubCutaneous at bedtime  insulin lispro (ADMELOG) corrective regimen sliding scale   SubCutaneous three times a day before meals  insulin lispro Injectable (ADMELOG) 3 Unit(s) SubCutaneous three times a day before meals  lactated ringers. 500 milliLiter(s) (50 mL/Hr) IV Continuous <Continuous>  letrozole 2.5 milliGRAM(s) Oral daily  Nephro-stacia 1 Tablet(s) Oral daily  polyethylene glycol 3350 17 Gram(s) Oral two times a day  sodium chloride 0.9% for Nebulization 3 milliLiter(s) Nebulizer every 6 hours      Physical Exam  General: Patient comfortable in bed   Vital Signs Last 12 Hrs  T(F): 97.9 (07-18-23 @ 04:57), Max: 97.9 (07-18-23 @ 04:57)  HR: 78 (07-18-23 @ 04:57) (78 - 78)  BP: 154/61 (07-18-23 @ 04:57) (154/61 - 154/61)  BP(mean): --  RR: 18 (07-18-23 @ 04:57) (18 - 18)  SpO2: 98% (07-18-23 @ 04:57) (98% - 98%)    CVS: S1S2   Respiratory: No wheezing, no crepitations  GI: Abdomen soft, bowel sounds positive  Musculoskeletal:  moves all extremities  : Voiding          Chief complaint  Patient is a 73y old  Female who presents with a chief complaint of respiratory distress (17 Jul 2023 22:43)     Labs and Fingersticks  CAPILLARY BLOOD GLUCOSE      POCT Blood Glucose.: 164 mg/dL (18 Jul 2023 08:09)  POCT Blood Glucose.: 147 mg/dL (17 Jul 2023 21:34)  POCT Blood Glucose.: 127 mg/dL (17 Jul 2023 16:32)  POCT Blood Glucose.: 82 mg/dL (17 Jul 2023 11:47)      Anion Gap: 11 (07-16 @ 09:19)      Calcium: 8.2 *L* (07-16 @ 09:19)          07-16    139  |  99  |  35<H>  ----------------------------<  177<H>  4.3   |  29  |  2.38<H>    Ca    8.2<L>      16 Jul 2023 09:19                          8.8    7.78  )-----------( 176      ( 16 Jul 2023 09:19 )             30.1     Medications  MEDICATIONS  (STANDING):  albuterol/ipratropium for Nebulization 3 milliLiter(s) Nebulizer every 6 hours  aspirin enteric coated 81 milliGRAM(s) Oral daily  atorvastatin 10 milliGRAM(s) Oral at bedtime  buDESOnide    Inhalation Suspension 0.5 milliGRAM(s) Inhalation every 12 hours  carvedilol 37.5 milliGRAM(s) Oral every 12 hours  chlorhexidine 2% Cloths 1 Application(s) Topical <User Schedule>  cholecalciferol 2000 Unit(s) Oral daily  cloNIDine 0.1 milliGRAM(s) Oral two times a day  dextrose 5%. 1000 milliLiter(s) (100 mL/Hr) IV Continuous <Continuous>  dextrose 5%. 1000 milliLiter(s) (50 mL/Hr) IV Continuous <Continuous>  dextrose 50% Injectable 25 Gram(s) IV Push once  dextrose 50% Injectable 25 Gram(s) IV Push once  dextrose 50% Injectable 12.5 Gram(s) IV Push once  donepezil 10 milliGRAM(s) Oral at bedtime  doxazosin 8 milliGRAM(s) Oral at bedtime  ferrous    sulfate 325 milliGRAM(s) Oral daily  fluticasone propionate 50 MICROgram(s)/spray Nasal Spray 1 Spray(s) Both Nostrils two times a day  glucagon  Injectable 1 milliGRAM(s) IntraMuscular once  hydrocortisone 1% Cream 1 Application(s) Topical two times a day  insulin glargine Injectable (LANTUS) 12 Unit(s) SubCutaneous at bedtime  insulin lispro (ADMELOG) corrective regimen sliding scale   SubCutaneous three times a day before meals  insulin lispro Injectable (ADMELOG) 3 Unit(s) SubCutaneous three times a day before meals  lactated ringers. 500 milliLiter(s) (50 mL/Hr) IV Continuous <Continuous>  letrozole 2.5 milliGRAM(s) Oral daily  Nephro-stacia 1 Tablet(s) Oral daily  polyethylene glycol 3350 17 Gram(s) Oral two times a day  sodium chloride 0.9% for Nebulization 3 milliLiter(s) Nebulizer every 6 hours      Physical Exam  General: Patient comfortable in bed   Vital Signs Last 12 Hrs  T(F): 97.9 (07-18-23 @ 04:57), Max: 97.9 (07-18-23 @ 04:57)  HR: 78 (07-18-23 @ 04:57) (78 - 78)  BP: 154/61 (07-18-23 @ 04:57) (154/61 - 154/61)  BP(mean): --  RR: 18 (07-18-23 @ 04:57) (18 - 18)  SpO2: 98% (07-18-23 @ 04:57) (98% - 98%)    CVS: S1S2   Respiratory: No wheezing, no crepitations  GI: Abdomen soft, bowel sounds positive  Musculoskeletal:  moves all extremities  : Voiding

## 2023-07-18 NOTE — PROGRESS NOTE ADULT - SUBJECTIVE AND OBJECTIVE BOX
Subjective: Patient seen and examined. No new events except as noted.    and Daughter at bedside.  More aware today.  Very itchy from generalized rash.    REVIEW OF SYSTEMS:    CONSTITUTIONAL: + weakness, fevers or chills  EYES/ENT: No visual changes;  No vertigo or throat pain   NECK: No pain or stiffness  RESPIRATORY: No cough, wheezing, hemoptysis; No shortness of breath  CARDIOVASCULAR: No chest pain or palpitations  GASTROINTESTINAL: No abdominal or epigastric pain. No nausea, vomiting, or hematemesis; No diarrhea or constipation. No melena or hematochezia.  GENITOURINARY: No dysuria, frequency or hematuria  NEUROLOGICAL: No numbness or weakness  SKIN: No itching, burning, rashes, or lesions   All other review of systems is negative unless indicated above.    MEDICATIONS:  MEDICATIONS  (STANDING):  albuterol/ipratropium for Nebulization 3 milliLiter(s) Nebulizer every 6 hours  aspirin enteric coated 81 milliGRAM(s) Oral daily  atorvastatin 10 milliGRAM(s) Oral at bedtime  buDESOnide    Inhalation Suspension 0.5 milliGRAM(s) Inhalation every 12 hours  carvedilol 37.5 milliGRAM(s) Oral every 12 hours  chlorhexidine 2% Cloths 1 Application(s) Topical <User Schedule>  cholecalciferol 2000 Unit(s) Oral daily  cloNIDine 0.1 milliGRAM(s) Oral two times a day  dextrose 5%. 1000 milliLiter(s) (100 mL/Hr) IV Continuous <Continuous>  dextrose 5%. 1000 milliLiter(s) (50 mL/Hr) IV Continuous <Continuous>  dextrose 50% Injectable 25 Gram(s) IV Push once  dextrose 50% Injectable 12.5 Gram(s) IV Push once  dextrose 50% Injectable 25 Gram(s) IV Push once  donepezil 10 milliGRAM(s) Oral at bedtime  doxazosin 8 milliGRAM(s) Oral at bedtime  fluticasone propionate 50 MICROgram(s)/spray Nasal Spray 1 Spray(s) Both Nostrils two times a day  glucagon  Injectable 1 milliGRAM(s) IntraMuscular once  hydrocortisone 1% Cream 1 Application(s) Topical two times a day  insulin glargine Injectable (LANTUS) 21 Unit(s) SubCutaneous at bedtime  insulin lispro (ADMELOG) corrective regimen sliding scale   SubCutaneous three times a day before meals  insulin lispro Injectable (ADMELOG) 8 Unit(s) SubCutaneous three times a day before meals  lactated ringers. 500 milliLiter(s) (50 mL/Hr) IV Continuous <Continuous>  letrozole 2.5 milliGRAM(s) Oral daily  Nephro-stacia 1 Tablet(s) Oral daily  piperacillin/tazobactam IVPB.. 3.375 Gram(s) IV Intermittent every 12 hours  polyethylene glycol 3350 17 Gram(s) Oral two times a day  sodium chloride 0.9% for Nebulization 3 milliLiter(s) Nebulizer every 6 hours    PHYSICAL EXAM:  Vital Signs Last 24 Hrs  T(C): 36.6 (18 Jul 2023 04:57), Max: 37.8 (17 Jul 2023 20:30)  T(F): 97.9 (18 Jul 2023 04:57), Max: 100 (17 Jul 2023 20:30)  HR: 78 (18 Jul 2023 04:57) (68 - 79)  BP: 154/61 (18 Jul 2023 04:57) (145/57 - 171/76)  BP(mean): --  RR: 18 (18 Jul 2023 04:57) (18 - 18)  SpO2: 98% (18 Jul 2023 04:57) (97% - 99%)    Parameters below as of 18 Jul 2023 04:57  Patient On (Oxygen Delivery Method): nasal cannula  O2 Flow (L/min): 2    I&O's Summary    17 Jul 2023 07:01  -  18 Jul 2023 07:00  --------------------------------------------------------  IN: 0 mL / OUT: 375 mL / NET: -375 mL    Appearance: NAD  HEENT:   Dry oral mucosa, PERRL, EOMI	  Lymphatic: No lymphadenopathy , no edema  Cardiovascular: Normal S1 S2, No JVD, No murmurs , Peripheral pulses palpable 2+ bilaterally  Respiratory: decreased bs 	  Gastrointestinal:  Soft, Non-tender, + BS	  Skin: generalized rash  Musculoskeletal: Normal range of motion, normal strength  Psychiatry: Mood & affect appropriate  Ext: No edema    LABS:    CARDIAC MARKERS:    proBNP:   Lipid Profile:   HgA1c:   TSH:     TELEMETRY: SR   ECG:  	  RADIOLOGY:   DIAGNOSTIC TESTING:  [ ] Echocardiogram:  [ ]  Catheterization:  [ ] Stress Test:    OTHER:

## 2023-07-18 NOTE — PROGRESS NOTE ADULT - ASSESSMENT
74 y/o female with multiple medical problems whom i see in my practice on outptn basis. last home visit marichuy 15   Ptn has h/o DM, HTN, breast CA, respiratory arrest and cardiac arrest (2018), CVA with residual weakness, h/o trache, PEG, both removed, now eating on her own but aspirates and has post prandial cough in addition to chronic productive cough. presumed 2/2 silent aspiration of her saliva      pt  presents to the ED with  complaining of worsening cough and orthopnea. ptn has chronic LE edema.       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 2 other specimens in lab - f/u results :  -she is lethargic: check ABG: : no sedatives:   -ABG with chr compensated hypercarbic resp failure:  no need for bipap:  not the reason fr ams: OR SLEEPINESS  -she is much m ore alert and awake today    RASH:   -new rash  :  -on steroids cream :   - cefepime changed to zosyn: id following  antibiotics to be finished tomorrow   -completed course now  acute on chronic CHF  -echo noted:   -defer to primary cards team   RUSS   -she likely has ac on chr CKD:  -she always had high creat before:   HTN  -her blood pressure is slightly had:   -cont to optimize anti hypertensives:   DM  monitor and control :    dvt prophylaxis : for dc planning

## 2023-07-18 NOTE — PROGRESS NOTE ADULT - SUBJECTIVE AND OBJECTIVE BOX
Patient is a 73y old  Female who presents with a chief complaint of respiratory distress (18 Jul 2023 18:13)      SUBJECTIVE / OVERNIGHT EVENTS: ptn is lethargic, has a diffuse rash, has pruritis, looks allergic in nature, will place on steroids, derm called, ABx DCed 7/17. completed course of Abx. AFB sputum neg, AFB Cx pending. green thick sputum resolved, now clear to yellow. cleared by speech therapy to eat a dysphagia diet. sleep wake cycle is off. stressed to family importance of a regular sleep wake cycle. PT eval    MEDICATIONS  (STANDING):  albuterol/ipratropium for Nebulization 3 milliLiter(s) Nebulizer every 6 hours  aspirin enteric coated 81 milliGRAM(s) Oral daily  atorvastatin 10 milliGRAM(s) Oral at bedtime  buDESOnide    Inhalation Suspension 0.5 milliGRAM(s) Inhalation every 12 hours  carvedilol 37.5 milliGRAM(s) Oral every 12 hours  chlorhexidine 2% Cloths 1 Application(s) Topical <User Schedule>  cholecalciferol 2000 Unit(s) Oral daily  cloNIDine 0.1 milliGRAM(s) Oral two times a day  dextrose 5%. 1000 milliLiter(s) (100 mL/Hr) IV Continuous <Continuous>  dextrose 5%. 1000 milliLiter(s) (50 mL/Hr) IV Continuous <Continuous>  dextrose 50% Injectable 25 Gram(s) IV Push once  dextrose 50% Injectable 25 Gram(s) IV Push once  dextrose 50% Injectable 12.5 Gram(s) IV Push once  donepezil 10 milliGRAM(s) Oral at bedtime  doxazosin 8 milliGRAM(s) Oral at bedtime  ferrous    sulfate 325 milliGRAM(s) Oral daily  fluticasone propionate 50 MICROgram(s)/spray Nasal Spray 1 Spray(s) Both Nostrils two times a day  glucagon  Injectable 1 milliGRAM(s) IntraMuscular once  hydrocortisone 1% Cream 1 Application(s) Topical two times a day  insulin glargine Injectable (LANTUS) 12 Unit(s) SubCutaneous at bedtime  insulin lispro (ADMELOG) corrective regimen sliding scale   SubCutaneous three times a day before meals  insulin lispro Injectable (ADMELOG) 3 Unit(s) SubCutaneous three times a day before meals  letrozole 2.5 milliGRAM(s) Oral daily  methylPREDNISolone sodium succinate Injectable 40 milliGRAM(s) IV Push every 12 hours  Nephro-stacia 1 Tablet(s) Oral daily  polyethylene glycol 3350 17 Gram(s) Oral two times a day  sodium chloride 0.9% for Nebulization 3 milliLiter(s) Nebulizer every 6 hours  sodium chloride 0.9%. 1000 milliLiter(s) (100 mL/Hr) IV Continuous <Continuous>    MEDICATIONS  (PRN):  acetaminophen     Tablet .. 650 milliGRAM(s) Oral every 6 hours PRN Temp greater or equal to 38C (100.4F), Mild Pain (1 - 3), Moderate Pain (4 - 6)  dextrose Oral Gel 15 Gram(s) Oral once PRN Blood Glucose LESS THAN 70 milliGRAM(s)/deciliter  guaiFENesin Oral Liquid (Sugar-Free) 100 milliGRAM(s) Oral every 6 hours PRN Cough  hydrOXYzine hydrochloride 25 milliGRAM(s) Oral every 6 hours PRN Itching      Vital Signs Last 24 Hrs  T(F): 98.4 (07-18-23 @ 13:04), Max: 98.4 (07-18-23 @ 13:04)  HR: 79 (07-18-23 @ 13:04) (78 - 79)  BP: 155/73 (07-18-23 @ 13:04) (154/61 - 155/73)  RR: 18 (07-18-23 @ 13:04) (18 - 18)  SpO2: 97% (07-18-23 @ 13:04) (97% - 98%)  Telemetry:   CAPILLARY BLOOD GLUCOSE      POCT Blood Glucose.: 201 mg/dL (18 Jul 2023 21:48)  POCT Blood Glucose.: 138 mg/dL (18 Jul 2023 16:01)  POCT Blood Glucose.: 139 mg/dL (18 Jul 2023 11:23)  POCT Blood Glucose.: 164 mg/dL (18 Jul 2023 08:09)    I&O's Summary    17 Jul 2023 07:01  -  18 Jul 2023 07:00  --------------------------------------------------------  IN: 0 mL / OUT: 375 mL / NET: -375 mL    18 Jul 2023 07:01  -  18 Jul 2023 22:07  --------------------------------------------------------  IN: 360 mL / OUT: 0 mL / NET: 360 mL        PHYSICAL EXAM:  GENERAL: NAD, well-developed  HEAD:  Atraumatic, Normocephalic  EYES: EOMI, PERRLA, conjunctiva and sclera clear  NECK: Supple, No JVD  CHEST/LUNG: Clear to auscultation bilaterally; No wheeze  HEART: Regular rate and rhythm; No murmurs, rubs, or gallops  ABDOMEN: Soft, Nontender, Nondistended; Bowel sounds present  EXTREMITIES:  2+ Peripheral Pulses, No clubbing, cyanosis, or edema  PSYCH: AAOx3  NEUROLOGY: non-focal  SKIN: No rashes or lesions    LABS:                        9.6    12.95 )-----------( 198      ( 18 Jul 2023 11:51 )             32.5     07-18    141  |  102  |  44<H>  ----------------------------<  132<H>  3.9   |  27  |  2.74<H>    Ca    8.6      18 Jul 2023 11:51            Urinalysis Basic - ( 18 Jul 2023 11:51 )    Color: x / Appearance: x / SG: x / pH: x  Gluc: 132 mg/dL / Ketone: x  / Bili: x / Urobili: x   Blood: x / Protein: x / Nitrite: x   Leuk Esterase: x / RBC: x / WBC x   Sq Epi: x / Non Sq Epi: x / Bacteria: x        RADIOLOGY & ADDITIONAL TESTS:    Imaging Personally Reviewed:    Consultant(s) Notes Reviewed:      Care Discussed with Consultants/Other Providers:

## 2023-07-18 NOTE — PROGRESS NOTE ADULT - ASSESSMENT
74 y/o female with DM, HTN, breast CA, respiratory arrest and cardiac arrest (2018), L PCA stroke 2018 s/p ILR, h/o trache, PEG, both removed, now eating on her own but aspirates and has post prandial cough in addition to chronic productive cough with c/f silent aspiration.      was walking with walker until ~Oct 2022 per daughter now wheelchair bound.   spoke iwth daughter at bedside who states for stroke she follows with house neurology Dr. Ramiro Garner and Renae Vargas for stroke as well as movement dr. Lance Vizcaino.   MRI brain 2018 B/L centrum semi ovale watershed infarcts   o/e AAOx1-2, mild dysarthira, RHHA follows simple commands, uppers 3-4/5 lowers 2-3/5, question of asterixes in uppers  states had MRI at [facility] radiology 4/2023 which was unchanged from prior imaging  NIHSS ~18 premrs 4  + RUSS   TTE done 7/13 7/17, more lethargic today.  c/f possible aspiration ; cefepime changed to zosyn. check CTH   CTH with chronic L occipital infarct   7/18 mental status improved compared to yesterday. family agrees.      Impression:   1) L PCA stroke, ESUS s/p ILR ; also with bilateral centrum semiovale watershed infarcts   2) weakness likely multifactorial, deconditioning, LE edema, anemia, RUSS, prior stroke and cardiopulmnoary arrest, prior strokes now possible new infection/aspiration   3) Dementia     - c/w asa 81mg daily and statin therapy, atorvastating 10mg PO QHS for secondary stroke prevention  - treatment of infection/aspiration per primary team. was on cefepime ; concern for drug rash, changed to zosyn, now off   - for dementia she is on donepezil 10mg PO QHS  - can check b12, RPR, TSH for reversible causes of dementia   - on airborne precuations to r/o TB    - Hemoglobin A1c and lipid panel  - telemetry  - PT/OT/SS/SLP, OOBC  - check FS, glucose control <180  - GI/DVT ppx  - Differential diagnosis and plan of care discussed with patient and/or family and primary team  - Thank you for allowing me to participate in the care of this patient. Call with questions.   - spoke with daughter at bedside , all questions answered   - spoke with daughter at bedside 7/15 and 7/17 and 7/18   dc planning    follows with house neurology Dr. Ramiro Garner and Renae Vargas for stroke as well as movement dr. Lance Vizcaino. who she should follow up outpatinet.   Anoop Bianchi MD  Vascular Neurology  Office: [phone]

## 2023-07-18 NOTE — PROGRESS NOTE ADULT - SUBJECTIVE AND OBJECTIVE BOX
Overnight events noted      VITAL:  T(C): , Max: 37.8 (07-17-23 @ 20:30)  T(F): , Max: 100 (07-17-23 @ 20:30)  HR: 78 (07-18-23 @ 04:57)  BP: 154/61 (07-18-23 @ 04:57)  BP(mean): --  RR: 18 (07-18-23 @ 04:57)  SpO2: 98% (07-18-23 @ 04:57)  Wt(kg): --      PHYSICAL EXAM:  Constitutional: lethargic, NAD  HEENT: NCAT, DMM  Neck: Supple, No JVD  Respiratory: CTA-b/l  Cardiovascular: RRR s1s2, no m/r/g  Gastrointestinal: BS+, soft, NT/ND  Extremities: No peripheral edema b/l  Neurological: no focal deficits; strength grossly intact  Back: no CVAT b/l  Skin: No rashes, no nevi      LABS:                        9.6    12.95 )-----------( 198      ( 18 Jul 2023 11:51 )             32.5     Na(141)/K(3.9)/Cl(102)/HCO3(27)/BUN(44)/Cr(2.74)Glu(132)/Ca(8.6)/Mg(--)/PO4(--)    07-18 @ 11:51  Na(139)/K(4.3)/Cl(99)/HCO3(29)/BUN(35)/Cr(2.38)Glu(177)/Ca(8.2)/Mg(--)/PO4(--)    07-16 @ 09:19    Urinalysis Basic - ( 18 Jul 2023 11:51 )  Color: x / Appearance: x / SG: x / pH: x  Gluc: 132 mg/dL / Ketone: x  / Bili: x / Urobili: x   Blood: x / Protein: x / Nitrite: x   Leuk Esterase: x / RBC: x / WBC x   Sq Epi: x / Non Sq Epi: x / Bacteria: x      IMPRESSION: 73F w/ HTN, DM2, CVA, breast CA-b/l mastectomy, and CAC-trach/reversal, 7/12/23 p/w cough/sob    (1)Renal - CKD4 with nephrotic-range proteinuria - highly likely from diabetic nephropathy. Mild rise in creatinine over past couple days - highly likely prerenally mediated    (2)CV - stage 2 hypertension - acceptably controlled for now, on Coreg, Clonidine, and Cardura    (3)Lytes - acceptable    (4)Anemia - iron deficiency    (5)Cavitary lung lesion - ruled out for TB      RECOMMEND:  (1)NS 100cc/h x 5h  (2)Meds as ordered/dose new meds for GFR 20-30  (3)No objection to discharge in a.m. if creatinine 2.8 or lower; would have her f/u with Nephrology Dr. Cem Neely in 1-4 weeks              Jimmy Colon MD  [facility]  Office/on call physician: [phone]  Cell (7a-7r): [phone]        at bedside  (+)diffuse rash/associated pruritus      VITAL:  T(C): , Max: 37.8 (07-17-23 @ 20:30)  T(F): , Max: 100 (07-17-23 @ 20:30)  HR: 78 (07-18-23 @ 04:57)  BP: 154/61 (07-18-23 @ 04:57)  BP(mean): --  RR: 18 (07-18-23 @ 04:57)  SpO2: 98% (07-18-23 @ 04:57)  Wt(kg): --      PHYSICAL EXAM:  Constitutional: lethargic, NAD  HEENT: NCAT, DMM  Neck: Supple, No JVD  Respiratory: CTA-b/l  Cardiovascular: RRR s1s2, no m/r/g  Gastrointestinal: BS+, soft, NT/ND  Extremities: No peripheral edema b/l  Neurological: no focal deficits; strength grossly intact  Back: no CVAT b/l  Skin: No rashes, no nevi      LABS:                        9.6    12.95 )-----------( 198      ( 18 Jul 2023 11:51 )             32.5     Na(141)/K(3.9)/Cl(102)/HCO3(27)/BUN(44)/Cr(2.74)Glu(132)/Ca(8.6)/Mg(--)/PO4(--)    07-18 @ 11:51  Na(139)/K(4.3)/Cl(99)/HCO3(29)/BUN(35)/Cr(2.38)Glu(177)/Ca(8.2)/Mg(--)/PO4(--)    07-16 @ 09:19    Urinalysis Basic - ( 18 Jul 2023 11:51 )  Color: x / Appearance: x / SG: x / pH: x  Gluc: 132 mg/dL / Ketone: x  / Bili: x / Urobili: x   Blood: x / Protein: x / Nitrite: x   Leuk Esterase: x / RBC: x / WBC x   Sq Epi: x / Non Sq Epi: x / Bacteria: x      IMPRESSION: 73F w/ HTN, DM2, CVA, breast CA-b/l mastectomy, and CAC-trach/reversal, 7/12/23 p/w cough/sob    (1)Renal - CKD4 with nephrotic-range proteinuria - highly likely from diabetic nephropathy. Mild rise in creatinine over past couple days - highly likely prerenally mediated in setting of increased insensible losses (rash)    (2)CV - stage 2 hypertension - acceptably controlled for now, on Coreg, Clonidine, and Cardura    (3)Lytes - acceptable    (4)Anemia - iron deficiency    (5)Cavitary lung lesion - ruled out for TB      RECOMMEND:  (1)NS 100cc/h x 5h  (2)Meds as ordered/dose new meds for GFR 20-30  (3)No objection to discharge in a.m. if creatinine 2.8 or lower; would have her f/u with Nephrology Dr. Cem Neely in 1-4 weeks              Jimmy Colon MD  [facility]  Office/on call physician: [phone]  Cell (7a-7p): [phone]

## 2023-07-18 NOTE — PROGRESS NOTE ADULT - ASSESSMENT
74 y/o female with multiple medical problems whom i see in my practice on outptn basis. last home visit marichuy 15   Ptn has h/o DM, HTN, breast CA, respiratory arrest and cardiac arrest (2018), CVA with residual weakness, h/o trache, PEG, both removed, now eating on her own but aspirates and has post prandial cough in addition to chronic productive cough. presumed 2/2 silent aspiration of her saliva this was d/w Dr. Jose Almendarez who is in agreement    Today ptn presents to the ED with  complaining of worsening cough and orthopnea. ptn has chronic LE edema.   Denies any headache, fever, chills, chest pain, abdominal pain, n/v/d, dysuria. Unable to obtain any history or ROS due to mental status.    A/P  7/12: Aspiration PNA, cannot R/O BRYAN. check sputum culture, acute on chronic diastolic chf, , chronic aspiration  ptn has multiple ABx allergies, start Cipro and flagyl which she tolerated in the past  cont outptn meds, get S&S eval  insulin on a sliding scale, RUSS, check bladder sono, renal sono  card, renal, pulm, endo ID called  cont outptn meds  7/13: ptn is awake, alert, daughter at bedside. daughter mentioned [facility], wants me to cont seeing her on outptn at home and her but also wants to cont w [facility] on outptn. i explained to her she needs to pick a pcp, she cannot have me and [facility] in the hospital. she wants to stay with me, if her father changes his mind, she will let me know.   Ptn looks better today, more communicative, smiling, eating rice pudding, coughing after each bite. awaitng S&S, will need MBS. didnt tolerate FEEST in the past. ptn is hungry. she used to have a trache and a PEG post CVA, was removed few years ago. HTN is not controlled coreg raised to 37.5 bid. ptn seen by Renal, Scr at baseline, started on Farxiga and will start Losartan 25 mg    7/14: MBS done: recs are small soft bite food w mildly thickened liquids. renal, card, ID, pulm, endo input appreciated. Ptn is in isolation, being ruled out for active TB. 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 now on CEFEPIME, sputum for TB ordered. ptn is lethargic, BP improved, daughter feels its 2/2 CLONIDINE. as per renal start LOSARTAN. i started LOSARTAN yesterday, but it was DCed. lethargy could be due to overdiuresis. remains on LASIX 40 mg q12H. family wants the ptn to go home . awaiting ID clearance prior to DC. daughter c/o LE weakness and inability to ambulate, neuro called    7/15: ptn is on ABx for PNA, being worked up for TB, seen by Neuro. no immediate interventions planned. cont present meds    7/16:  ptn is lethargic, has a rash, daughter is concerned its a reaction to the ABx, nonpruritic. daughter is refusing to start LOSARTAN. discussed its renal protective in ptns w DM. green sputum resolved, now its clear-yellow    7/17: ptn has an allergic rash, will start ATARAX for it. cleared by ID to DC isolation, DC ABx, dc planning tomorrow daughter wants home,  wants rehab, will decide in am    7/18: ptn is lethargic, has a diffuse rash, has pruritis, looks allergic in nature, will place on steroids, derm called, ABx DCed 7/17. completed course of Abx. AFB sputum neg, AFB Cx pending. green thick sputum resolved, now clear to yellow. cleared by speech therapy to eat a dysphagia diet. sleep wake cycle is off. stressed to family importance of a regular sleep wake cycle. PT eval                      dvt ppx w [facility]

## 2023-07-18 NOTE — PROGRESS NOTE ADULT - ASSESSMENT
73F admitted 7/12/23 for  with a PMH significant for respiratory distress in the setting of possible Aspiration pneumonia and CHF exacerbation.      Latent TB, In April 2017 was provided with INH - developed rash after 6 weeks and changed to other medication  (?ifampim) for additonal 3 months as per martinez  Breast Cancer 2/17  Aspiration 2/2 CVA  DM  CHF    Previously hospitalized   March 2018 with flu, resp arrest RUSS  MSSA bacteremia  - has PEA, trach and PEG  5/12 --> 5/22/18  Hospitalized with bradycardia  - has superficial abscess on back  I&D: no grwoth  Rx: Zosyn 5/13 --> 5/16/18 12/29/21 --> 1/4/22    was treated with CIPRO/Flagyl 12/30/21 --> 1/5/22  -   daughter states that she developed CDIFF after discharge     proBNP on arrival to ED was 8075 diuresed w/ Lasix; Cardiology and Pulm following  Today  patient is ill appearing with frequent cough  7/12/23 Chest CT notable for "Upper lung predominant bilateral multifocal consolidation with branching   tubular opacities, suggestive of endobronchial spread of infection.   Interval right apical thick-walled cyst since 2019. TB should be   considered in the appropriate clinical setting"  7/14 fatigued     developed pruritic rash and somnolence  Sputum AFB  NEG x3: 7/14, 7/15, 7/16   7/15 BC x2 NGTD  7/17 aspiration noted  7/18 macular rash persists  -- no resp decompensation off antibiotics    Antibiotics  Cipro  7/13  Flagyl  7/13  Cefepime 7/13-->7/15  Zosyn 7/26-->17    SUGGEST  monitor off antibiotics

## 2023-07-19 NOTE — PROGRESS NOTE ADULT - SUBJECTIVE AND OBJECTIVE BOX
Date of Service: 07-19-23 @ 14:42    Patient is a 73y old  Female who presents with a chief complaint of respiratory distress (19 Jul 2023 12:22)      Any change in ROS: seems to be doing ok : no sob:  not much of itching    MEDICATIONS  (STANDING):  albuterol/ipratropium for Nebulization 3 milliLiter(s) Nebulizer every 6 hours  aspirin enteric coated 81 milliGRAM(s) Oral daily  atorvastatin 10 milliGRAM(s) Oral at bedtime  buDESOnide    Inhalation Suspension 0.5 milliGRAM(s) Inhalation every 12 hours  carvedilol 37.5 milliGRAM(s) Oral every 12 hours  chlorhexidine 2% Cloths 1 Application(s) Topical <User Schedule>  cholecalciferol 2000 Unit(s) Oral daily  cloNIDine 0.1 milliGRAM(s) Oral two times a day  dextrose 5%. 1000 milliLiter(s) (100 mL/Hr) IV Continuous <Continuous>  dextrose 5%. 1000 milliLiter(s) (50 mL/Hr) IV Continuous <Continuous>  dextrose 50% Injectable 25 Gram(s) IV Push once  dextrose 50% Injectable 25 Gram(s) IV Push once  dextrose 50% Injectable 12.5 Gram(s) IV Push once  donepezil 10 milliGRAM(s) Oral at bedtime  doxazosin 8 milliGRAM(s) Oral at bedtime  ferrous    sulfate 325 milliGRAM(s) Oral daily  fluticasone propionate 50 MICROgram(s)/spray Nasal Spray 1 Spray(s) Both Nostrils two times a day  glucagon  Injectable 1 milliGRAM(s) IntraMuscular once  hydrocortisone 1% Cream 1 Application(s) Topical two times a day  insulin glargine Injectable (LANTUS) 12 Unit(s) SubCutaneous at bedtime  insulin lispro (ADMELOG) corrective regimen sliding scale   SubCutaneous three times a day before meals  insulin lispro Injectable (ADMELOG) 3 Unit(s) SubCutaneous three times a day before meals  letrozole 2.5 milliGRAM(s) Oral daily  methylPREDNISolone sodium succinate Injectable 40 milliGRAM(s) IV Push every 12 hours  Nephro-stacia 1 Tablet(s) Oral daily  polyethylene glycol 3350 17 Gram(s) Oral two times a day  sodium chloride 0.9% for Nebulization 3 milliLiter(s) Nebulizer every 6 hours  sodium chloride 0.9%. 1000 milliLiter(s) (100 mL/Hr) IV Continuous <Continuous>    MEDICATIONS  (PRN):  acetaminophen     Tablet .. 650 milliGRAM(s) Oral every 6 hours PRN Temp greater or equal to 38C (100.4F), Mild Pain (1 - 3), Moderate Pain (4 - 6)  dextrose Oral Gel 15 Gram(s) Oral once PRN Blood Glucose LESS THAN 70 milliGRAM(s)/deciliter  guaiFENesin Oral Liquid (Sugar-Free) 100 milliGRAM(s) Oral every 6 hours PRN Cough  hydrOXYzine hydrochloride 25 milliGRAM(s) Oral every 6 hours PRN Itching    Vital Signs Last 24 Hrs  T(C): 36.6 (19 Jul 2023 13:14), Max: 36.8 (18 Jul 2023 21:42)  T(F): 97.8 (19 Jul 2023 13:14), Max: 98.2 (18 Jul 2023 21:42)  HR: 72 (19 Jul 2023 13:14) (70 - 88)  BP: 142/63 (19 Jul 2023 13:14) (135/69 - 186/74)  BP(mean): --  RR: 18 (19 Jul 2023 13:14) (18 - 18)  SpO2: 97% (19 Jul 2023 13:14) (97% - 99%)    Parameters below as of 19 Jul 2023 13:14  Patient On (Oxygen Delivery Method): nasal cannula  O2 Flow (L/min): 2      I&O's Summary    18 Jul 2023 07:01  -  19 Jul 2023 07:00  --------------------------------------------------------  IN: 710 mL / OUT: 0 mL / NET: 710 mL          Physical Exam:   GENERAL: NAD, well-groomed, well-developed  HEENT: MANDY/   Atraumatic, Normocephalic  ENMT: No tonsillar erythema, exudates, or enlargement; Moist mucous membranes, Good dentition, No lesions  NECK: Supple, No JVD, Normal thyroid  CHEST/LUNG: Clear to auscultaion  CVS: Regular rate and rhythm; No murmurs, rubs, or gallops  GI: : Soft, Nontender, Nondistended; Bowel sounds present  NERVOUS SYSTEM:  Alert & Oriented X3  EXTREMITIES- edema  LYMPH: No lymphadenopathy noted  SKIN: No rashes or lesions  ENDOCRINOLOGY: No Thyromegaly  PSYCH: Appropriate    Labs:  30                            9.6    12.95 )-----------( 198      ( 18 Jul 2023 11:51 )             32.5                         8.8    7.78  )-----------( 176      ( 16 Jul 2023 09:19 )             30.1     07-18    141  |  102  |  44<H>  ----------------------------<  132<H>  3.9   |  27  |  2.74<H>  07-16    139  |  99  |  35<H>  ----------------------------<  177<H>  4.3   |  29  |  2.38<H>    Ca    8.6      18 Jul 2023 11:51      CAPILLARY BLOOD GLUCOSE      POCT Blood Glucose.: 227 mg/dL (19 Jul 2023 12:51)  POCT Blood Glucose.: 213 mg/dL (19 Jul 2023 09:00)  POCT Blood Glucose.: 201 mg/dL (18 Jul 2023 21:48)  POCT Blood Glucose.: 138 mg/dL (18 Jul 2023 16:01)          Urinalysis Basic - ( 18 Jul 2023 11:51 )    Color: x / Appearance: x / SG: x / pH: x  Gluc: 132 mg/dL / Ketone: x  / Bili: x / Urobili: x   Blood: x / Protein: x / Nitrite: x   Leuk Esterase: x / RBC: x / WBC x   Sq Epi: x / Non Sq Epi: x / Bacteria: x            RECENT CULTURES:  07-16 @ 12:01 .Sputum Sputum         ra< from: CT Head No Cont (07.17.23 @ 22:44) >  the source data.    COMPARISON: Prior brain MRI study from 2/6/2018. Prior CT study of the   head from 2/3/2018.    FINDINGS: Encephalomalacia and gliosis is seen within the left occipital   lobe is chronic infarction.    There is no acute intracranial hemorrhage, mass effect, shift of the   midline structures, herniation, hydrocephalus, or abnormal extra axial   fluid collection.    There is diffuse cerebral volume loss with prominence of the sulci,   fissures, and cisternal spaces which is normal for the patient's age.   There is mild periventricular white matter hypoattenuation statistically   compatible with microvascular type changes given calcific atherosclerotic   disease of the intracranial arteries.    The paranasal sinuses and right mastoid cavity is clear. There is under   pneumatization and sclerosis of the left hip in the mastoid cavity. Soft   tissue is seen within the left middle ear.    There is evidenceof bilateral cataract removal.    Multiple rounded lucencies in the skull are nonspecific.    IMPRESSION: No acute intracranial findings.    Chronic left occipital lobe infarct an similar-appearing mild chronic   white matter microvascular type changes.    --- End of Report ---            RAMÓN VIGIL MD; Attending Radiologist  This document has been electronically signed. Jul 18 2023  8:00AM    < end of copied text >           07-15 @ 09:06 .Blood Blood-Catheter                No growth at 4 days    07-15 @ 09:03 .Blood Blood-Catheter                No growth at 4 days    07-15 @ 06:38 .Sputum Sputum                  07-14 @ 21:21 .Sputum Sputum                  07-14 @ 07:25 .Sputum Sputum                Culture is being performed.          RESPIRATORY CULTURES:          Studies  Chest X-RAY  CT SCAN Chest   Venous Dopplers: LE:   CT Abdomen  Others

## 2023-07-19 NOTE — DIETITIAN INITIAL EVALUATION ADULT - OTHER INFO
NKFA per patient's .  reports patient's BW was around 130lbs prior to onset of fluid retention, states was told most recent BW has been in the low 140lbs range by bed scale weights. Will continue to monitor weight trend.    Hyperphosphatemia 7/15. No repeat level noted. Request updated level with next labs. If remains elevated, recommend dietary phosphorus restriction. Patient w/ h/o DM; admelog, lantus in place for insulin regimen in house. Nephrovite, vitamin D3 noted. Solumedrol recently added 2/2 new rash per chart.

## 2023-07-19 NOTE — PROGRESS NOTE ADULT - ASSESSMENT
73F admitted 7/12/23 for  with a PMH significant for respiratory distress in the setting of possible Aspiration pneumonia and CHF exacerbation.      Latent TB, In April 2017 was provided with INH - developed rash after 6 weeks and changed to other medication  (?ifampim) for additonal 3 months as per martinez  Breast Cancer 2/17  Aspiration 2/2 CVA  DM  CHF    Previously hospitalized   March 2018 with flu, resp arrest RUSS  MSSA bacteremia  - has PEA, trach and PEG  5/12 --> 5/22/18  Hospitalized with bradycardia  - has superficial abscess on back  I&D: no grwoth  Rx: Zosyn 5/13 --> 5/16/18 12/29/21 --> 1/4/22    was treated with CIPRO/Flagyl 12/30/21 --> 1/5/22  -   daughter states that she developed CDIFF after discharge     proBNP on arrival to ED was 8075 diuresed w/ Lasix; Cardiology and Pulm following  Today  patient is ill appearing with frequent cough  7/12/23 Chest CT notable for "Upper lung predominant bilateral multifocal consolidation with branching   tubular opacities, suggestive of endobronchial spread of infection.   Interval right apical thick-walled cyst since 2019. TB should be   considered in the appropriate clinical setting"  7/14 fatigued     developed pruritic rash and somnolence  Sputum AFB  NEG x3: 7/14, 7/15, 7/16   7/15 BC x2 NGTD  7/17 aspiration noted  7/18, 7/19 macular rash persists  -- no resp decompensation off antibiotics  Methylprednisolone 40 every 12  hours  7/18 -->  7/19 hyperglycemia    Antibiotics  Cipro  7/13  Flagyl  7/13  Cefepime 7/13-->7/15  Zosyn 7/26-->17    SUGGEST  monitor off antibiotics

## 2023-07-19 NOTE — PROGRESS NOTE ADULT - SUBJECTIVE AND OBJECTIVE BOX
Patient is a 73y old  Female who presents with a chief complaint of respiratory distress (19 Jul 2023 18:00)      SUBJECTIVE / OVERNIGHT EVENTS: s/p treatment for PNA, developed a rash subsequently, prob an allergic response to ABx. started solumedrol yesterday 3/3 severity of the rash and edema, today much better, seen by derm, has hyperglycemia 2/2 steroids, will adjust insulin. ptn is more awake today, ate well today    MEDICATIONS  (STANDING):  albuterol/ipratropium for Nebulization 3 milliLiter(s) Nebulizer every 6 hours  aspirin enteric coated 81 milliGRAM(s) Oral daily  atorvastatin 10 milliGRAM(s) Oral at bedtime  buDESOnide    Inhalation Suspension 0.5 milliGRAM(s) Inhalation every 12 hours  carvedilol 37.5 milliGRAM(s) Oral every 12 hours  chlorhexidine 2% Cloths 1 Application(s) Topical <User Schedule>  cholecalciferol 2000 Unit(s) Oral daily  clobetasol 0.05% Ointment 1 Application(s) Topical two times a day  cloNIDine 0.1 milliGRAM(s) Oral two times a day  dextrose 5%. 1000 milliLiter(s) (50 mL/Hr) IV Continuous <Continuous>  dextrose 5%. 1000 milliLiter(s) (100 mL/Hr) IV Continuous <Continuous>  dextrose 50% Injectable 25 Gram(s) IV Push once  dextrose 50% Injectable 25 Gram(s) IV Push once  dextrose 50% Injectable 12.5 Gram(s) IV Push once  donepezil 10 milliGRAM(s) Oral at bedtime  doxazosin 8 milliGRAM(s) Oral at bedtime  ferrous    sulfate 325 milliGRAM(s) Oral daily  fluticasone propionate 50 MICROgram(s)/spray Nasal Spray 1 Spray(s) Both Nostrils two times a day  glucagon  Injectable 1 milliGRAM(s) IntraMuscular once  insulin glargine Injectable (LANTUS) 25 Unit(s) SubCutaneous at bedtime  insulin lispro (ADMELOG) corrective regimen sliding scale   SubCutaneous at bedtime  insulin lispro (ADMELOG) corrective regimen sliding scale   SubCutaneous three times a day before meals  insulin lispro Injectable (ADMELOG) 6 Unit(s) SubCutaneous three times a day before meals  letrozole 2.5 milliGRAM(s) Oral daily  methylPREDNISolone sodium succinate Injectable 40 milliGRAM(s) IV Push every 12 hours  Nephro-stacia 1 Tablet(s) Oral daily  polyethylene glycol 3350 17 Gram(s) Oral two times a day  sodium chloride 0.9% for Nebulization 3 milliLiter(s) Nebulizer every 6 hours  sodium chloride 0.9%. 1000 milliLiter(s) (100 mL/Hr) IV Continuous <Continuous>    MEDICATIONS  (PRN):  acetaminophen     Tablet .. 650 milliGRAM(s) Oral every 6 hours PRN Temp greater or equal to 38C (100.4F), Mild Pain (1 - 3), Moderate Pain (4 - 6)  dextrose Oral Gel 15 Gram(s) Oral once PRN Blood Glucose LESS THAN 70 milliGRAM(s)/deciliter  guaiFENesin Oral Liquid (Sugar-Free) 100 milliGRAM(s) Oral every 6 hours PRN Cough  hydrOXYzine hydrochloride 25 milliGRAM(s) Oral every 6 hours PRN Itching      Vital Signs Last 24 Hrs  T(F): 97.8 (07-19-23 @ 13:14), Max: 98.2 (07-18-23 @ 21:42)  HR: 82 (07-19-23 @ 17:25) (70 - 88)  BP: 158/66 (07-19-23 @ 17:25) (135/69 - 186/74)  RR: 18 (07-19-23 @ 13:14) (18 - 18)  SpO2: 97% (07-19-23 @ 13:14) (97% - 99%)  Telemetry:   CAPILLARY BLOOD GLUCOSE      POCT Blood Glucose.: 363 mg/dL (19 Jul 2023 18:44)  POCT Blood Glucose.: 401 mg/dL (19 Jul 2023 16:50)  POCT Blood Glucose.: 400 mg/dL (19 Jul 2023 16:49)  POCT Blood Glucose.: 227 mg/dL (19 Jul 2023 12:51)  POCT Blood Glucose.: 213 mg/dL (19 Jul 2023 09:00)  POCT Blood Glucose.: 201 mg/dL (18 Jul 2023 21:48)    I&O's Summary    18 Jul 2023 07:01  -  19 Jul 2023 07:00  --------------------------------------------------------  IN: 710 mL / OUT: 0 mL / NET: 710 mL        PHYSICAL EXAM:  GENERAL: NAD, well-developed  HEAD:  Atraumatic, Normocephalic  EYES: EOMI, PERRLA, conjunctiva and sclera clear  NECK: Supple, No JVD  CHEST/LUNG: Clear to auscultation bilaterally; No wheeze  HEART: Regular rate and rhythm; No murmurs, rubs, or gallops  ABDOMEN: Soft, Nontender, Nondistended; Bowel sounds present  EXTREMITIES:  2+ Peripheral Pulses, No clubbing, cyanosis, or edema  PSYCH: AAOx3  NEUROLOGY: non-focal  SKIN: No rashes or lesions    LABS:                        9.6    12.95 )-----------( 198      ( 18 Jul 2023 11:51 )             32.5     07-18    141  |  102  |  44<H>  ----------------------------<  132<H>  3.9   |  27  |  2.74<H>    Ca    8.6      18 Jul 2023 11:51            Urinalysis Basic - ( 18 Jul 2023 11:51 )    Color: x / Appearance: x / SG: x / pH: x  Gluc: 132 mg/dL / Ketone: x  / Bili: x / Urobili: x   Blood: x / Protein: x / Nitrite: x   Leuk Esterase: x / RBC: x / WBC x   Sq Epi: x / Non Sq Epi: x / Bacteria: x        RADIOLOGY & ADDITIONAL TESTS:    Imaging Personally Reviewed:    Consultant(s) Notes Reviewed:      Care Discussed with Consultants/Other Providers:

## 2023-07-19 NOTE — PROGRESS NOTE ADULT - SUBJECTIVE AND OBJECTIVE BOX
Chief complaint  Patient is a 73y old  Female who presents with a chief complaint of respiratory distress (19 Jul 2023 12:02)         Labs and Fingersticks  CAPILLARY BLOOD GLUCOSE      POCT Blood Glucose.: 213 mg/dL (19 Jul 2023 09:00)  POCT Blood Glucose.: 201 mg/dL (18 Jul 2023 21:48)  POCT Blood Glucose.: 138 mg/dL (18 Jul 2023 16:01)      Anion Gap: 12 (07-18 @ 11:51)      Calcium: 8.6 (07-18 @ 11:51)          07-18    141  |  102  |  44<H>  ----------------------------<  132<H>  3.9   |  27  |  2.74<H>    Ca    8.6      18 Jul 2023 11:51                          9.6    12.95 )-----------( 198      ( 18 Jul 2023 11:51 )             32.5     Medications  MEDICATIONS  (STANDING):  albuterol/ipratropium for Nebulization 3 milliLiter(s) Nebulizer every 6 hours  aspirin enteric coated 81 milliGRAM(s) Oral daily  atorvastatin 10 milliGRAM(s) Oral at bedtime  buDESOnide    Inhalation Suspension 0.5 milliGRAM(s) Inhalation every 12 hours  carvedilol 37.5 milliGRAM(s) Oral every 12 hours  chlorhexidine 2% Cloths 1 Application(s) Topical <User Schedule>  cholecalciferol 2000 Unit(s) Oral daily  cloNIDine 0.1 milliGRAM(s) Oral two times a day  dextrose 5%. 1000 milliLiter(s) (100 mL/Hr) IV Continuous <Continuous>  dextrose 5%. 1000 milliLiter(s) (50 mL/Hr) IV Continuous <Continuous>  dextrose 50% Injectable 25 Gram(s) IV Push once  dextrose 50% Injectable 25 Gram(s) IV Push once  dextrose 50% Injectable 12.5 Gram(s) IV Push once  donepezil 10 milliGRAM(s) Oral at bedtime  doxazosin 8 milliGRAM(s) Oral at bedtime  ferrous    sulfate 325 milliGRAM(s) Oral daily  fluticasone propionate 50 MICROgram(s)/spray Nasal Spray 1 Spray(s) Both Nostrils two times a day  glucagon  Injectable 1 milliGRAM(s) IntraMuscular once  hydrocortisone 1% Cream 1 Application(s) Topical two times a day  insulin glargine Injectable (LANTUS) 12 Unit(s) SubCutaneous at bedtime  insulin lispro (ADMELOG) corrective regimen sliding scale   SubCutaneous three times a day before meals  insulin lispro Injectable (ADMELOG) 3 Unit(s) SubCutaneous three times a day before meals  letrozole 2.5 milliGRAM(s) Oral daily  methylPREDNISolone sodium succinate Injectable 40 milliGRAM(s) IV Push every 12 hours  Nephro-stacia 1 Tablet(s) Oral daily  polyethylene glycol 3350 17 Gram(s) Oral two times a day  sodium chloride 0.9% for Nebulization 3 milliLiter(s) Nebulizer every 6 hours  sodium chloride 0.9%. 1000 milliLiter(s) (100 mL/Hr) IV Continuous <Continuous>      Physical Exam  General: Patient comfortable in bed   Vital Signs Last 12 Hrs  T(F): 97.9 (07-19-23 @ 04:36), Max: 97.9 (07-19-23 @ 04:36)  HR: 70 (07-19-23 @ 04:36) (70 - 70)  BP: 135/69 (07-19-23 @ 04:36) (135/69 - 135/69)  BP(mean): --  RR: 18 (07-19-23 @ 04:36) (18 - 18)  SpO2: 97% (07-19-23 @ 04:36) (97% - 97%)    CVS: S1S2   Respiratory: No wheezing, no crepitations  GI: Abdomen soft, bowel sounds positive  Musculoskeletal:  moves all extremities  : Voiding        Chief complaint  Patient is a 73y old  Female who presents with a chief complaint of respiratory distress (19 Jul 2023 12:02)         Labs and Fingersticks  CAPILLARY BLOOD GLUCOSE      POCT Blood Glucose.: 213 mg/dL (19 Jul 2023 09:00)  POCT Blood Glucose.: 201 mg/dL (18 Jul 2023 21:48)  POCT Blood Glucose.: 138 mg/dL (18 Jul 2023 16:01)      Anion Gap: 12 (07-18 @ 11:51)      Calcium: 8.6 (07-18 @ 11:51)          07-18    141  |  102  |  44<H>  ----------------------------<  132<H>  3.9   |  27  |  2.74<H>    Ca    8.6      18 Jul 2023 11:51                          9.6    12.95 )-----------( 198      ( 18 Jul 2023 11:51 )             32.5     Medications  MEDICATIONS  (STANDING):  albuterol/ipratropium for Nebulization 3 milliLiter(s) Nebulizer every 6 hours  aspirin enteric coated 81 milliGRAM(s) Oral daily  atorvastatin 10 milliGRAM(s) Oral at bedtime  buDESOnide    Inhalation Suspension 0.5 milliGRAM(s) Inhalation every 12 hours  carvedilol 37.5 milliGRAM(s) Oral every 12 hours  chlorhexidine 2% Cloths 1 Application(s) Topical <User Schedule>  cholecalciferol 2000 Unit(s) Oral daily  cloNIDine 0.1 milliGRAM(s) Oral two times a day  dextrose 5%. 1000 milliLiter(s) (100 mL/Hr) IV Continuous <Continuous>  dextrose 5%. 1000 milliLiter(s) (50 mL/Hr) IV Continuous <Continuous>  dextrose 50% Injectable 25 Gram(s) IV Push once  dextrose 50% Injectable 25 Gram(s) IV Push once  dextrose 50% Injectable 12.5 Gram(s) IV Push once  donepezil 10 milliGRAM(s) Oral at bedtime  doxazosin 8 milliGRAM(s) Oral at bedtime  ferrous    sulfate 325 milliGRAM(s) Oral daily  fluticasone propionate 50 MICROgram(s)/spray Nasal Spray 1 Spray(s) Both Nostrils two times a day  glucagon  Injectable 1 milliGRAM(s) IntraMuscular once  hydrocortisone 1% Cream 1 Application(s) Topical two times a day  insulin glargine Injectable (LANTUS) 12 Unit(s) SubCutaneous at bedtime  insulin lispro (ADMELOG) corrective regimen sliding scale   SubCutaneous three times a day before meals  insulin lispro Injectable (ADMELOG) 3 Unit(s) SubCutaneous three times a day before meals  letrozole 2.5 milliGRAM(s) Oral daily  methylPREDNISolone sodium succinate Injectable 40 milliGRAM(s) IV Push every 12 hours  Nephro-stacia 1 Tablet(s) Oral daily  polyethylene glycol 3350 17 Gram(s) Oral two times a day  sodium chloride 0.9% for Nebulization 3 milliLiter(s) Nebulizer every 6 hours  sodium chloride 0.9%. 1000 milliLiter(s) (100 mL/Hr) IV Continuous <Continuous>      Physical Exam  General: Patient comfortable in bed   Vital Signs Last 12 Hrs  T(F): 97.9 (07-19-23 @ 04:36), Max: 97.9 (07-19-23 @ 04:36)  HR: 70 (07-19-23 @ 04:36) (70 - 70)  BP: 135/69 (07-19-23 @ 04:36) (135/69 - 135/69)  BP(mean): --  RR: 18 (07-19-23 @ 04:36) (18 - 18)  SpO2: 97% (07-19-23 @ 04:36) (97% - 97%)    CVS: S1S2   Respiratory: No wheezing, no crepitations  GI: Abdomen soft, bowel sounds positive  Musculoskeletal:  moves all extremities  : Voiding

## 2023-07-19 NOTE — DIETITIAN INITIAL EVALUATION ADULT - REASON INDICATOR FOR ASSESSMENT
Consult for "star CHF"  Source: chart, patient's  at bedside who provided most of history and answering questions, patient w/ minimal contribution during this encounter (has been A&Ox1, lethargic per chart)  Chart reviewed, events noted

## 2023-07-19 NOTE — DIETITIAN INITIAL EVALUATION ADULT - NUTRITIONGOAL OUTCOME1
- patient will consume >75% of meals during hospital admission w/ good tolerance following SLP's recommendations upon their assessments

## 2023-07-19 NOTE — PROGRESS NOTE ADULT - ASSESSMENT
72 y/o female with multiple medical problems whom i see in my practice on outptn basis. last home visit marichuy 15   Ptn has h/o DM, HTN, breast CA, respiratory arrest and cardiac arrest (2018), CVA with residual weakness, h/o trache, PEG, both removed, now eating on her own but aspirates and has post prandial cough in addition to chronic productive cough. presumed 2/2 silent aspiration of her saliva this was d/w Dr. Jose Almendarez who is in agreement    Today ptn presents to the ED with  complaining of worsening cough and orthopnea. ptn has chronic LE edema.   Denies any headache, fever, chills, chest pain, abdominal pain, n/v/d, dysuria. Unable to obtain any history or ROS due to mental status.    A/P  7/12: Aspiration PNA, cannot R/O BRYAN. check sputum culture, acute on chronic diastolic chf, , chronic aspiration  ptn has multiple ABx allergies, start Cipro and flagyl which she tolerated in the past  cont outptn meds, get S&S eval  insulin on a sliding scale, RUSS, check bladder sono, renal sono  card, renal, pulm, endo ID called  cont outptn meds  7/13: ptn is awake, alert, daughter at bedside. daughter [facility] wants me to cont seeing her on outptn at home and her but also wants to cont w [facility] on outptn. i explained to her she needs to pick a pcp, she cannot have me and [facility] in the hospital. she wants to stay with me, if her father changes his mind, she will let me know.   Ptn looks better today, more communicative, smiling, eating rice pudding, coughing after each bite. awaitng S&S, will need MBS. didnt tolerate FEEST in the past. ptn is hungry. she used to have a trache and a PEG post CVA, was removed few years ago. HTN is not controlled coreg raised to 37.5 bid. ptn seen by Renal, Scr at baseline, started on Farxiga and will start Losartan 25 mg    7/14: MBS done: recs are small soft bite food w mildly thickened liquids. renal, card, ID, pulm, endo input appreciated. Ptn is in isolation, being ruled out for active TB. 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 now on CEFEPIME, sputum for TB ordered. ptn is lethargic, BP improved, daughter feels its 2/2 CLONIDINE. as per renal start LOSARTAN. i started LOSARTAN yesterday, but it was DCed. lethargy could be due to overdiuresis. remains on LASIX 40 mg q12H. family wants the ptn to go home . awaiting ID clearance prior to DC. daughter c/o LE weakness and inability to ambulate, neuro called    7/15: ptn is on ABx for PNA, being worked up for TB, seen by Neuro. no immediate interventions planned. cont present meds    7/16:  ptn is lethargic, has a rash, daughter is concerned its a reaction to the ABx, nonpruritic. daughter is refusing to start LOSARTAN. discussed its renal protective in ptns w DM. green sputum resolved, now its clear-yellow    7/17: ptn has an allergic rash, will start ATARAX for it. cleared by ID to DC isolation, DC ABx, dc planning tomorrow daughter wants home,  wants rehab, will decide in am    7/18: ptn is lethargic, has a diffuse rash, has pruritis, looks allergic in nature, will place on steroids, derm called, ABx DCed 7/17. completed course of Abx. AFB sputum neg, AFB Cx pending. green thick sputum resolved, now clear to yellow. cleared by speech therapy to eat a dysphagia diet. sleep wake cycle is off. stressed to family importance of a regular sleep wake cycle. PT eval    7/19: s/p treatment for PNA, developed a rash subsequently, prob an allergic response to ABx. started solumedrol yesterday 3/3 severity of the rash and edema, today much better, seen by derm, has hyperglycemia 2/2 steroids, will adjust insulin. ptn is more awake today, ate well today                          dvt ppx w HSC

## 2023-07-19 NOTE — DIETITIAN INITIAL EVALUATION ADULT - PERTINENT MEDS FT
MEDICATIONS  (STANDING):  albuterol/ipratropium for Nebulization 3 milliLiter(s) Nebulizer every 6 hours  aspirin enteric coated 81 milliGRAM(s) Oral daily  atorvastatin 10 milliGRAM(s) Oral at bedtime  buDESOnide    Inhalation Suspension 0.5 milliGRAM(s) Inhalation every 12 hours  carvedilol 37.5 milliGRAM(s) Oral every 12 hours  chlorhexidine 2% Cloths 1 Application(s) Topical <User Schedule>  cholecalciferol 2000 Unit(s) Oral daily  cloNIDine 0.1 milliGRAM(s) Oral two times a day  dextrose 5%. 1000 milliLiter(s) (50 mL/Hr) IV Continuous <Continuous>  dextrose 5%. 1000 milliLiter(s) (100 mL/Hr) IV Continuous <Continuous>  dextrose 50% Injectable 25 Gram(s) IV Push once  dextrose 50% Injectable 25 Gram(s) IV Push once  dextrose 50% Injectable 12.5 Gram(s) IV Push once  donepezil 10 milliGRAM(s) Oral at bedtime  doxazosin 8 milliGRAM(s) Oral at bedtime  ferrous    sulfate 325 milliGRAM(s) Oral daily  fluticasone propionate 50 MICROgram(s)/spray Nasal Spray 1 Spray(s) Both Nostrils two times a day  glucagon  Injectable 1 milliGRAM(s) IntraMuscular once  hydrocortisone 1% Cream 1 Application(s) Topical two times a day  insulin glargine Injectable (LANTUS) 12 Unit(s) SubCutaneous at bedtime  insulin lispro (ADMELOG) corrective regimen sliding scale   SubCutaneous three times a day before meals  insulin lispro Injectable (ADMELOG) 3 Unit(s) SubCutaneous three times a day before meals  letrozole 2.5 milliGRAM(s) Oral daily  methylPREDNISolone sodium succinate Injectable 40 milliGRAM(s) IV Push every 12 hours  Nephro-stacia 1 Tablet(s) Oral daily  polyethylene glycol 3350 17 Gram(s) Oral two times a day  sodium chloride 0.9% for Nebulization 3 milliLiter(s) Nebulizer every 6 hours  sodium chloride 0.9%. 1000 milliLiter(s) (100 mL/Hr) IV Continuous <Continuous>    MEDICATIONS  (PRN):  acetaminophen     Tablet .. 650 milliGRAM(s) Oral every 6 hours PRN Temp greater or equal to 38C (100.4F), Mild Pain (1 - 3), Moderate Pain (4 - 6)  dextrose Oral Gel 15 Gram(s) Oral once PRN Blood Glucose LESS THAN 70 milliGRAM(s)/deciliter  guaiFENesin Oral Liquid (Sugar-Free) 100 milliGRAM(s) Oral every 6 hours PRN Cough  hydrOXYzine hydrochloride 25 milliGRAM(s) Oral every 6 hours PRN Itching

## 2023-07-19 NOTE — DIETITIAN INITIAL EVALUATION ADULT - ADD RECOMMEND
1. follow SLP recommendations upon their assessment for most appropriate diet consistency for the patient  2. provide assistance with all meals for the patient  3. oral nutrition supplements reviewed and offered; patient's  currently declines at this time  4. hyperphosphatemia 7/15: request updated level with next labs, if remains elevated, recommend dietary phosphorus restriction  5. monitor PO intake, weight trend, electrolytes, blood glucose levels, labs, BMs

## 2023-07-19 NOTE — CHART NOTE - NSCHARTNOTEFT_GEN_A_CORE
Dermatology team consult received for possible drug eruption. Patient's labs and meds were reviewed.  Can treat with topical steroids and trend daily cbc w/ diff and cmp. As discussed with the primary team provider, will formally staff tomorrow.     Please page [phone] for further related questions.    Clifton Cardenas MD  Resident Physician, PGY3  [facility]  Pager: [phone]

## 2023-07-19 NOTE — PROGRESS NOTE ADULT - SUBJECTIVE AND OBJECTIVE BOX
Neurology Progress Note    S: Patient seen and examined.  at bedside; c/o rash     Medication:  MEDICATIONS  (STANDING):  albuterol/ipratropium for Nebulization 3 milliLiter(s) Nebulizer every 6 hours  aspirin enteric coated 81 milliGRAM(s) Oral daily  atorvastatin 10 milliGRAM(s) Oral at bedtime  buDESOnide    Inhalation Suspension 0.5 milliGRAM(s) Inhalation every 12 hours  carvedilol 37.5 milliGRAM(s) Oral every 12 hours  chlorhexidine 2% Cloths 1 Application(s) Topical <User Schedule>  cholecalciferol 2000 Unit(s) Oral daily  cloNIDine 0.1 milliGRAM(s) Oral two times a day  dextrose 5%. 1000 milliLiter(s) (50 mL/Hr) IV Continuous <Continuous>  dextrose 5%. 1000 milliLiter(s) (100 mL/Hr) IV Continuous <Continuous>  dextrose 50% Injectable 25 Gram(s) IV Push once  dextrose 50% Injectable 12.5 Gram(s) IV Push once  dextrose 50% Injectable 25 Gram(s) IV Push once  donepezil 10 milliGRAM(s) Oral at bedtime  doxazosin 8 milliGRAM(s) Oral at bedtime  ferrous    sulfate 325 milliGRAM(s) Oral daily  fluticasone propionate 50 MICROgram(s)/spray Nasal Spray 1 Spray(s) Both Nostrils two times a day  glucagon  Injectable 1 milliGRAM(s) IntraMuscular once  hydrocortisone 1% Cream 1 Application(s) Topical two times a day  insulin glargine Injectable (LANTUS) 12 Unit(s) SubCutaneous at bedtime  insulin lispro (ADMELOG) corrective regimen sliding scale   SubCutaneous three times a day before meals  insulin lispro Injectable (ADMELOG) 3 Unit(s) SubCutaneous three times a day before meals  letrozole 2.5 milliGRAM(s) Oral daily  methylPREDNISolone sodium succinate Injectable 40 milliGRAM(s) IV Push every 12 hours  Nephro-stacia 1 Tablet(s) Oral daily  polyethylene glycol 3350 17 Gram(s) Oral two times a day  sodium chloride 0.9% for Nebulization 3 milliLiter(s) Nebulizer every 6 hours  sodium chloride 0.9%. 1000 milliLiter(s) (100 mL/Hr) IV Continuous <Continuous>    MEDICATIONS  (PRN):  acetaminophen     Tablet .. 650 milliGRAM(s) Oral every 6 hours PRN Temp greater or equal to 38C (100.4F), Mild Pain (1 - 3), Moderate Pain (4 - 6)  dextrose Oral Gel 15 Gram(s) Oral once PRN Blood Glucose LESS THAN 70 milliGRAM(s)/deciliter  guaiFENesin Oral Liquid (Sugar-Free) 100 milliGRAM(s) Oral every 6 hours PRN Cough  hydrOXYzine hydrochloride 25 milliGRAM(s) Oral every 6 hours PRN Itching        Vitals:    Vital Signs Last 24 Hrs  T(C): 36.6 (07-19-23 @ 04:36), Max: 36.9 (07-18-23 @ 13:04)  T(F): 97.9 (07-19-23 @ 04:36), Max: 98.4 (07-18-23 @ 13:04)  HR: 70 (07-19-23 @ 04:36) (70 - 88)  BP: 135/69 (07-19-23 @ 04:36) (135/69 - 186/74)  BP(mean): --  RR: 18 (07-19-23 @ 04:36) (18 - 18)  SpO2: 97% (07-19-23 @ 04:36) (97% - 99%)            General Exam:   General Appearance: Appropriately dressed and in no acute distress       Head: Normocephalic, atraumatic and no dysmorphic features  Ear, Nose, and Throat: Moist mucous membranes  CVS: S1S2+  Resp: No SOB, no wheeze or rhonchi  Abd: soft NTND  Extremities: No edema, no cyanosis  Skin: + rash     Neurological Exam:    Neurological Exam:  Mental Status: Awake, alert and oriented x 1 (more lethargic 7/17.  not following commands. minimal verbal   Cranial Nerves: PERRL, EOMI, R HHA, sensation V1-V3 intact,  mild facial asymmetry, equal elevation of palate, scm/trap 5/5, tongue is midline on protrusion. no obvious papilledema on fundoscopic exam. hearing is grossly intact.   Motor: uppers 3-4/5, lowers 2-3/5 question of asterixes previously.  now not really participating [facility] exam   Sensation: Intact to light touch and pinprick throughout. no right/left confusion. no extinction to tactile on DSS.    Reflexes: 1+ throughout at biceps, brachioradialis, triceps, patellars and ankles bilaterally and equal. No clonus. R toe and L toe were both downgoing.  Coordination: No dysmetria on FNF  unable in lowers   Gait: wheelchair bound         I personally reviewed the below data/images/labs:  CBC Full  -  ( 18 Jul 2023 11:51 )  WBC Count : 12.95 K/uL  RBC Count : 3.28 M/uL  Hemoglobin : 9.6 g/dL  Hematocrit : 32.5 %  Platelet Count - Automated : 198 K/uL  Mean Cell Volume : 99.1 fl  Mean Cell Hemoglobin : 29.3 pg  Mean Cell Hemoglobin Concentration : 29.5 gm/dL  Auto Neutrophil # : x  Auto Lymphocyte # : x  Auto Monocyte # : x  Auto Eosinophil # : x  Auto Basophil # : x  Auto Neutrophil % : x  Auto Lymphocyte % : x  Auto Monocyte % : x  Auto Eosinophil % : x  Auto Basophil % : x      07-18    141  |  102  |  44<H>  ----------------------------<  132<H>  3.9   |  27  |  2.74<H>    Ca    8.6      18 Jul 2023 11:51        Urinalysis Basic - ( 16 Jul 2023 09:19 )    Color: x / Appearance: x / SG: x / pH: x  Gluc: 177 mg/dL / Ketone: x  / Bili: x / Urobili: x   Blood: x / Protein: x / Nitrite: x   Leuk Esterase: x / RBC: x / WBC x   Sq Epi: x / Non Sq Epi: x / Bacteria: x      < from: CT Head No Cont (07.17.23 @ 22:44) >    ACC: [id number] EXAM:  CT BRAIN   ORDERED BY: CUONG WILLIAMSON     PROCEDURE DATE:  07/17/2023          INTERPRETATION:  .    CLINICAL INFORMATION: Altered mental status.    TECHNIQUE: Multiple axial CT images of the head were obtained without   contrast. Sagittal and coronal reconstructed images were acquired from   the source data.    COMPARISON: Prior brain MRI study from 2/6/2018. Prior CT study of the   head from 2/3/2018.    FINDINGS: Encephalomalacia and gliosis is seen within the left occipital   lobe is chronic infarction.    There is no acute intracranial hemorrhage, mass effect, shift of the   midline structures, herniation, hydrocephalus, or abnormal extra axial   fluid collection.    There is diffuse cerebral volume loss with prominence of the sulci,   fissures, and cisternal spaces which is normal for the patient's age.   There is mild periventricular white matter hypoattenuation statistically   compatible with microvascular type changes given calcific atherosclerotic   disease of the intracranial arteries.    The paranasal sinuses and right mastoid cavity is clear. There is under   pneumatization and sclerosis of the left hip in the mastoid cavity. Soft   tissue is seen within the left middle ear.    There is evidenceof bilateral cataract removal.    Multiple rounded lucencies in the skull are nonspecific.    IMPRESSION: No acute intracranial findings.    Chronic left occipital lobe infarct an similar-appearing mild chronic   white matter microvascular type changes.    --- End of Report ---            RAMÓN VIGIL MD; Attending Radiologist  This document has been electronically signed. Jul 18 2023  8:00AM    < end of copied text >

## 2023-07-19 NOTE — PROGRESS NOTE ADULT - ASSESSMENT
73F female h/o DM, HTN, breast CA, respiratory arrest and cardiac arrest (2018), CVA with residual weakness, h/o trache, PEG, being worked up for asp PNA.   Assessment  DMT2: 73y Female with DM T2 with hyperglycemia, A1C pending, was on oral meds and MIXED insulin at home, now on basal bolus insulin with coverage, blood sugars now down trending, inconsistent meal intake.   Asp PNA: ?chronic aspiration, sputum cultures, abx. Aspiration precautions.   HTN: on antihypertensive medications, monitored, asymptomatic.  CKD: Monitor labs/BMP, renal dosing.     Discussed plan and management wit Dr Naresh Bales NP - TEAMS  Viraj Infante MD  Cell: [phone]  Office: [phone]             73F female h/o DM, HTN, breast CA, respiratory arrest and cardiac arrest (2018), CVA with residual weakness, h/o trache, PEG, being worked up for asp PNA.   Assessment  DMT2: 73y Female with DM T2 with hyperglycemia, A1C pending, was on oral meds and MIXED insulin at home, now on basal bolus insulin with coverage, blood sugars now  down trending, inconsistent meal intake.   Asp PNA: ?chronic aspiration, sputum cultures, abx. Aspiration precautions.   HTN: on antihypertensive medications, monitored, asymptomatic.  CKD: Monitor labs/BMP, renal dosing.     Discussed plan and management wit Dr Naresh Bales NP - TEAMS  Viraj Infante MD  Cell: [phone]  Office: [phone]

## 2023-07-19 NOTE — PROGRESS NOTE ADULT - ASSESSMENT
74 y/o female with DM, HTN, breast CA, respiratory arrest and cardiac arrest (2018), L PCA stroke 2018 s/p ILR, h/o trache, PEG, both removed, now eating on her own but aspirates and has post prandial cough in addition to chronic productive cough with c/f silent aspiration.      was walking with walker until ~Oct 2022 per daughter now wheelchair bound.   spoke iwth daughter at bedside who states for stroke she follows with house neurology Dr. Ramiro Garner and Renae Vargas for stroke as well as movement dr. Lance Vizcaino.   MRI brain 2018 B/L centrum semi ovale watershed infarcts   o/e AAOx1-2, mild dysarthira, RHHA follows simple commands, uppers 3-4/5 lowers 2-3/5, question of asterixes in uppers  states had MRI at [facility] radiology 4/2023 which was unchanged from prior imaging  NIHSS ~18 premrs 4  + RUSS   TTE done 7/13 7/17, more lethargic today.  c/f possible aspiration ; cefepime changed to zosyn. check CTH   CTH with chronic L occipital infarct   7/18 mental status improved compared to yesterday. family agrees.      Impression:   1) L PCA stroke, ESUS s/p ILR ; also with bilateral centrum semiovale watershed infarcts   2) weakness likely multifactorial, deconditioning, LE edema, anemia, RUSS, prior stroke and cardiopulmnoary arrest, prior strokes now possible new infection/aspiration   3) Dementia     - f/u derm for rash   - c/w asa 81mg daily and statin therapy, atorvastating 10mg PO QHS for secondary stroke prevention  - treatment of infection/aspiration per primary team. was on cefepime ; concern for drug rash, changed to zosyn, now off   - for dementia she is on donepezil 10mg PO QHS  - can check b12, RPR, TSH for reversible causes of dementia   - on airborne precuations to r/o TB  now off AFB neg   - Hemoglobin A1c and lipid panel  - telemetry  - PT/OT/SS/SLP, OOBC  - check FS, glucose control <180  - GI/DVT ppx  - Differential diagnosis and plan of care discussed with patient and/or family and primary team  - Thank you for allowing me to participate in the care of this patient. Call with questions.   - spoke with daughter at bedside 7/15 and 7/17 and 7/18 ; spoke with  7/19   dc planning    follows with house neurology Dr. Ramiro Garner and Renae Vargas for stroke as well as movement dr. Lance Vizcaino. who she should follow up outpatinet.   Anoop Bianchi MD  Vascular Neurology  Office: [phone]

## 2023-07-19 NOTE — PROGRESS NOTE ADULT - SUBJECTIVE AND OBJECTIVE BOX
Overnight events noted      VITAL:  T(C): , Max: 36.9 (07-18-23 @ 13:04)  T(F): , Max: 98.4 (07-18-23 @ 13:04)  HR: 70 (07-19-23 @ 04:36)  BP: 135/69 (07-19-23 @ 04:36)  BP(mean): --  RR: 18 (07-19-23 @ 04:36)  SpO2: 97% (07-19-23 @ 04:36)  Wt(kg): --      PHYSICAL EXAM:  Constitutional: lethargic, NAD  HEENT: NCAT, DMM  Neck: Supple, No JVD  Respiratory: CTA-b/l  Cardiovascular: RRR s1s2, no m/r/g  Gastrointestinal: BS+, soft, NT/ND  Extremities: No peripheral edema b/l  Neurological: no focal deficits; strength grossly intact  Back: no CVAT b/l  Skin: No rashes, no nevi    LABS:                        9.6    12.95 )-----------( 198      ( 18 Jul 2023 11:51 )             32.5     Na(141)/K(3.9)/Cl(102)/HCO3(27)/BUN(44)/Cr(2.74)Glu(132)/Ca(8.6)/Mg(--)/PO4(--)    07-18 @ 11:51  Na(139)/K(4.3)/Cl(99)/HCO3(29)/BUN(35)/Cr(2.38)Glu(177)/Ca(8.2)/Mg(--)/PO4(--)    07-16 @ 09:19    Urinalysis Basic - ( 18 Jul 2023 11:51 )  Color: x / Appearance: x / SG: x / pH: x  Gluc: 132 mg/dL / Ketone: x  / Bili: x / Urobili: x   Blood: x / Protein: x / Nitrite: x   Leuk Esterase: x / RBC: x / WBC x   Sq Epi: x / Non Sq Epi: x / Bacteria: x      IMPRESSION: 73F w/ HTN, DM2, CVA, breast CA-b/l mastectomy, and CAC-trach/reversal, 7/12/23 p/w cough/sob    (1)Renal - CKD4 with nephrotic-range proteinuria - highly likely from diabetic nephropathy. Mild rise in creatinine as of yesterday; presumed prerenally mediated. Labs pending from today, s/p NS 100cc/h x 5h last night    (2)CV - stage 2 hypertension - acceptably controlled for now, on Coreg, Clonidine, and Cardura. Relative hypovolemia as of yesterday; s/p NS 100cc/h x 5h.     (3)Rash - receiving hydrocortisone cream and prn oral Hydroxyzine       RECOMMEND:  (1)BMP daily while admitted  (2)Repeat NS 100cc/h x 5h if creatinine today is 2.9 or higher  (3)Urine: lytes, creatinine, UA, if creatinine today is 2.9 or higher  (4)Meds as ordered/dose new meds for GFR 20-30  (3)No objection to discharge if creatinine 2.8 or lower; would have her f/u with Nephrology Dr. Cem Neely in 1-4 weeks            Jimmy Colon MD  [facility]  Office/on call physician: [phone]  Cell (7a-7p): [phone]       no complaints      VITAL:  T(C): , Max: 36.9 (07-18-23 @ 13:04)  T(F): , Max: 98.4 (07-18-23 @ 13:04)  HR: 70 (07-19-23 @ 04:36)  BP: 135/69 (07-19-23 @ 04:36)  BP(mean): --  RR: 18 (07-19-23 @ 04:36)  SpO2: 97% (07-19-23 @ 04:36)  Wt(kg): --      PHYSICAL EXAM:  Constitutional: lethargic, NAD  HEENT: NCAT, DMM  Neck: Supple, No JVD  Respiratory: CTA-b/l  Cardiovascular: RRR s1s2, no m/r/g  Gastrointestinal: BS+, soft, NT/ND  Extremities: No peripheral edema b/l  Neurological: no focal deficits; strength grossly intact  Back: no CVAT b/l  Skin: No rashes, no nevi    LABS:                        9.6    12.95 )-----------( 198      ( 18 Jul 2023 11:51 )             32.5     Na(141)/K(3.9)/Cl(102)/HCO3(27)/BUN(44)/Cr(2.74)Glu(132)/Ca(8.6)/Mg(--)/PO4(--)    07-18 @ 11:51  Na(139)/K(4.3)/Cl(99)/HCO3(29)/BUN(35)/Cr(2.38)Glu(177)/Ca(8.2)/Mg(--)/PO4(--)    07-16 @ 09:19    Urinalysis Basic - ( 18 Jul 2023 11:51 )  Color: x / Appearance: x / SG: x / pH: x  Gluc: 132 mg/dL / Ketone: x  / Bili: x / Urobili: x   Blood: x / Protein: x / Nitrite: x   Leuk Esterase: x / RBC: x / WBC x   Sq Epi: x / Non Sq Epi: x / Bacteria: x      IMPRESSION: 73F w/ HTN, DM2, CVA, breast CA-b/l mastectomy, and CAC-trach/reversal, 7/12/23 p/w cough/sob    (1)Renal - CKD4 with nephrotic-range proteinuria - highly likely from diabetic nephropathy. Mild rise in creatinine as of yesterday; presumed prerenally mediated. Labs pending from today, s/p NS 100cc/h x 5h last night    (2)CV - stage 2 hypertension - acceptably controlled for now, on Coreg, Clonidine, and Cardura. Relative hypovolemia as of yesterday; s/p NS 100cc/h x 5h.     (3)Rash - receiving hydrocortisone cream and prn oral Hydroxyzine       RECOMMEND:  (1)BMP daily while admitted  (2)Repeat NS 100cc/h x 5h if creatinine today is 2.9 or higher  (3)Urine: lytes, creatinine, UA, if creatinine today is 2.9 or higher  (4)Meds as ordered/dose new meds for GFR 20-30  (3)No objection to discharge if creatinine 2.8 or lower; would have her f/u with Nephrology Dr. Cem Neely in 1-4 weeks            Jimmy Colon MD  [facility]  Office/on call physician: [phone]  Cell (7a-7p): [phone]

## 2023-07-19 NOTE — CHART NOTE - NSCHARTNOTEFT_GEN_A_CORE
Patient with Hx of CVA with residual weakness, recurrent aspiration pneumonia patient requires a reclining wheelchair to assist in ADLS. Patient is unable to perform a functional weight shift on his own and cannot self transfer . Patient cannot ambulate safely with cane or walker. Patient has a caregiver to assist with maneuvering the wheelchair. Pt has not expressed an unwillingness to use the wheelchair. Pt has room at home for reclining wheelchair. Use of the reclining wheelchair will significantly improve the pt's ability to participate in daily ADLs and the patient will use it on regular basis in the home.   Patient also requires elevating leg rests. Due to patient's above said medical condition and resulting in deconditioning and due to renal failure needs the leg rest to reduce lower extremity edema. When utilizing a reclining wheelchair, it can allow the use to increase their sitting tolerance by placing the user in a more natural position. They may also assist the user who fatigues and requires rest periods throughout the day, as pt has difficulty transferring to their bed.    [id number]

## 2023-07-19 NOTE — CHART NOTE - NSCHARTNOTEFT_GEN_A_CORE
Hyperglycemia in the setting of Solumedrol     POCT Blood Glucose.: 401 mg/dL (19 Jul 2023 16:50)  POCT Blood Glucose.: 400 mg/dL (19 Jul 2023 16:49)  POCT Blood Glucose.: 227 mg/dL (19 Jul 2023 12:51)  POCT Blood Glucose.: 213 mg/dL (19 Jul 2023 09:00)  POCT Blood Glucose.: 201 mg/dL (18 Jul 2023 21:48)    Premeal Admelog increased to 6 units pre each meal and increased the sliding scale to moderate scale    [id number]

## 2023-07-19 NOTE — PROGRESS NOTE ADULT - ASSESSMENT
74 y/o female with multiple medical problems whom i see in my practice on outptn basis. last home visit marichuy 15   Ptn has h/o DM, HTN, breast CA, respiratory arrest and cardiac arrest (2018), CVA with residual weakness, h/o trache, PEG, both removed, now eating on her own but aspirates and has post prandial cough in addition to chronic productive cough. presumed 2/2 silent aspiration of her saliva      pt  presents to the ED with  complaining of worsening cough and orthopnea. ptn has chronic LE edema.       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 2 other specimens in lab - f/u results :  -she is lethargic: check ABG: : no sedatives:   -ABG with chr compensated hypercarbic resp failure:  no need for bipap:  not the reason fr ams: OR SLEEPINESS  -she is much m ore alert and awake today  :  -no new complaints:   RASH:   -new rash  :  -on steroids cream :   - cefepime changed to zosyn: id following  antibiotics to be finished tomorrow   -completed course now  acute on chronic CHF  -echo noted:   -defer to primary cards team   RUSS   -she likely has ac on chr CKD:  -she always had high creat before:   HTN  -her blood pressure is slightly had:   -cont to optimize anti hypertensives:   DM  monitor and control :    dvt prophylaxis : for dc planning

## 2023-07-19 NOTE — DIETITIAN INITIAL EVALUATION ADULT - ENERGY INTAKE
Patient's  reports patient had prior decreased appetite/PO intake but has been doing better as of the past day taking more than usual including a few servings of cream of wheat, rice puddings. Food preferences obtained. Fair (50-75%) Patient's  reports patient had prior decreased appetite/PO intake but has been doing better as of the past day taking more than usual including a few servings of cream of wheat, rice puddings. Food preferences obtained. Beginning of education points started during encounter; further education w/ CHF principles upon f/u.

## 2023-07-19 NOTE — PROGRESS NOTE ADULT - SUBJECTIVE AND OBJECTIVE BOX
Subjective: Patient seen and examined. No new events except as noted.    at bedside.  Still itchy from rash.     REVIEW OF SYSTEMS:    CONSTITUTIONAL: + weakness, fevers or chills  EYES/ENT: No visual changes;  No vertigo or throat pain   NECK: No pain or stiffness  RESPIRATORY: No cough, wheezing, hemoptysis; No shortness of breath  CARDIOVASCULAR: No chest pain or palpitations  GASTROINTESTINAL: No abdominal or epigastric pain. No nausea, vomiting, or hematemesis; No diarrhea or constipation. No melena or hematochezia.  GENITOURINARY: No dysuria, frequency or hematuria  NEUROLOGICAL: No numbness or weakness  SKIN: No itching, burning, rashes, or lesions   All other review of systems is negative unless indicated above.    MEDICATIONS:  MEDICATIONS  (STANDING):  albuterol/ipratropium for Nebulization 3 milliLiter(s) Nebulizer every 6 hours  aspirin enteric coated 81 milliGRAM(s) Oral daily  atorvastatin 10 milliGRAM(s) Oral at bedtime  buDESOnide    Inhalation Suspension 0.5 milliGRAM(s) Inhalation every 12 hours  carvedilol 37.5 milliGRAM(s) Oral every 12 hours  chlorhexidine 2% Cloths 1 Application(s) Topical <User Schedule>  cholecalciferol 2000 Unit(s) Oral daily  cloNIDine 0.1 milliGRAM(s) Oral two times a day  dextrose 5%. 1000 milliLiter(s) (100 mL/Hr) IV Continuous <Continuous>  dextrose 5%. 1000 milliLiter(s) (50 mL/Hr) IV Continuous <Continuous>  dextrose 50% Injectable 25 Gram(s) IV Push once  dextrose 50% Injectable 12.5 Gram(s) IV Push once  dextrose 50% Injectable 25 Gram(s) IV Push once  donepezil 10 milliGRAM(s) Oral at bedtime  doxazosin 8 milliGRAM(s) Oral at bedtime  fluticasone propionate 50 MICROgram(s)/spray Nasal Spray 1 Spray(s) Both Nostrils two times a day  glucagon  Injectable 1 milliGRAM(s) IntraMuscular once  hydrocortisone 1% Cream 1 Application(s) Topical two times a day  insulin glargine Injectable (LANTUS) 21 Unit(s) SubCutaneous at bedtime  insulin lispro (ADMELOG) corrective regimen sliding scale   SubCutaneous three times a day before meals  insulin lispro Injectable (ADMELOG) 8 Unit(s) SubCutaneous three times a day before meals  lactated ringers. 500 milliLiter(s) (50 mL/Hr) IV Continuous <Continuous>  letrozole 2.5 milliGRAM(s) Oral daily  Nephro-stacia 1 Tablet(s) Oral daily  piperacillin/tazobactam IVPB.. 3.375 Gram(s) IV Intermittent every 12 hours  polyethylene glycol 3350 17 Gram(s) Oral two times a day  sodium chloride 0.9% for Nebulization 3 milliLiter(s) Nebulizer every 6 hours    PHYSICAL EXAM:  Vital Signs Last 24 Hrs  T(C): 36.6 (19 Jul 2023 04:36), Max: 36.9 (18 Jul 2023 13:04)  T(F): 97.9 (19 Jul 2023 04:36), Max: 98.4 (18 Jul 2023 13:04)  HR: 70 (19 Jul 2023 04:36) (70 - 88)  BP: 135/69 (19 Jul 2023 04:36) (135/69 - 186/74)  BP(mean): --  RR: 18 (19 Jul 2023 04:36) (18 - 18)  SpO2: 97% (19 Jul 2023 04:36) (97% - 99%)    Parameters below as of 19 Jul 2023 04:36  Patient On (Oxygen Delivery Method): nasal cannula  O2 Flow (L/min): 2    I&O's Summary    18 Jul 2023 07:01  -  19 Jul 2023 07:00  --------------------------------------------------------  IN: 710 mL / OUT: 0 mL / NET: 710 mL    Appearance: NAD  HEENT: Dry oral mucosa, PERRL, EOMI	  Lymphatic: No lymphadenopathy , no edema  Cardiovascular: Normal S1 S2, No JVD, No murmurs , Peripheral pulses palpable 2+ bilaterally  Respiratory: decreased bs 	  Gastrointestinal:  Soft, Non-tender, + BS	  Skin: generalized rash  Musculoskeletal: Normal range of motion, normal strength  Psychiatry: Mood & affect appropriate  Ext: No edema    LABS:    CARDIAC MARKERS:    proBNP:   Lipid Profile:   HgA1c:   TSH:     TELEMETRY: SR   ECG:  	  RADIOLOGY:   DIAGNOSTIC TESTING:  [ ] Echocardiogram:  [ ]  Catheterization:  [ ] Stress Test:    OTHER:  Subjective: Patient seen and examined. No new events except as noted.    at bedside.  Still itchy from rash.  Pt resting.     REVIEW OF SYSTEMS:    CONSTITUTIONAL: + weakness, fevers or chills  EYES/ENT: No visual changes;  No vertigo or throat pain   NECK: No pain or stiffness  RESPIRATORY: No cough, wheezing, hemoptysis; No shortness of breath  CARDIOVASCULAR: No chest pain or palpitations  GASTROINTESTINAL: No abdominal or epigastric pain. No nausea, vomiting, or hematemesis; No diarrhea or constipation. No melena or hematochezia.  GENITOURINARY: No dysuria, frequency or hematuria  NEUROLOGICAL: No numbness or weakness  SKIN: No itching, burning, rashes, or lesions   All other review of systems is negative unless indicated above.    MEDICATIONS:  MEDICATIONS  (STANDING):  albuterol/ipratropium for Nebulization 3 milliLiter(s) Nebulizer every 6 hours  aspirin enteric coated 81 milliGRAM(s) Oral daily  atorvastatin 10 milliGRAM(s) Oral at bedtime  buDESOnide    Inhalation Suspension 0.5 milliGRAM(s) Inhalation every 12 hours  carvedilol 37.5 milliGRAM(s) Oral every 12 hours  chlorhexidine 2% Cloths 1 Application(s) Topical <User Schedule>  cholecalciferol 2000 Unit(s) Oral daily  cloNIDine 0.1 milliGRAM(s) Oral two times a day  dextrose 5%. 1000 milliLiter(s) (100 mL/Hr) IV Continuous <Continuous>  dextrose 5%. 1000 milliLiter(s) (50 mL/Hr) IV Continuous <Continuous>  dextrose 50% Injectable 25 Gram(s) IV Push once  dextrose 50% Injectable 12.5 Gram(s) IV Push once  dextrose 50% Injectable 25 Gram(s) IV Push once  donepezil 10 milliGRAM(s) Oral at bedtime  doxazosin 8 milliGRAM(s) Oral at bedtime  fluticasone propionate 50 MICROgram(s)/spray Nasal Spray 1 Spray(s) Both Nostrils two times a day  glucagon  Injectable 1 milliGRAM(s) IntraMuscular once  hydrocortisone 1% Cream 1 Application(s) Topical two times a day  insulin glargine Injectable (LANTUS) 21 Unit(s) SubCutaneous at bedtime  insulin lispro (ADMELOG) corrective regimen sliding scale   SubCutaneous three times a day before meals  insulin lispro Injectable (ADMELOG) 8 Unit(s) SubCutaneous three times a day before meals  lactated ringers. 500 milliLiter(s) (50 mL/Hr) IV Continuous <Continuous>  letrozole 2.5 milliGRAM(s) Oral daily  Nephro-stacia 1 Tablet(s) Oral daily  piperacillin/tazobactam IVPB.. 3.375 Gram(s) IV Intermittent every 12 hours  polyethylene glycol 3350 17 Gram(s) Oral two times a day  sodium chloride 0.9% for Nebulization 3 milliLiter(s) Nebulizer every 6 hours    PHYSICAL EXAM:  Vital Signs Last 24 Hrs  T(C): 36.6 (19 Jul 2023 04:36), Max: 36.9 (18 Jul 2023 13:04)  T(F): 97.9 (19 Jul 2023 04:36), Max: 98.4 (18 Jul 2023 13:04)  HR: 70 (19 Jul 2023 04:36) (70 - 88)  BP: 135/69 (19 Jul 2023 04:36) (135/69 - 186/74)  BP(mean): --  RR: 18 (19 Jul 2023 04:36) (18 - 18)  SpO2: 97% (19 Jul 2023 04:36) (97% - 99%)    Parameters below as of 19 Jul 2023 04:36  Patient On (Oxygen Delivery Method): nasal cannula  O2 Flow (L/min): 2    I&O's Summary    18 Jul 2023 07:01  -  19 Jul 2023 07:00  --------------------------------------------------------  IN: 710 mL / OUT: 0 mL / NET: 710 mL    Appearance: NAD  HEENT: Dry oral mucosa, PERRL, EOMI	  Lymphatic: No lymphadenopathy , no edema  Cardiovascular: Normal S1 S2, No JVD, No murmurs , Peripheral pulses palpable 2+ bilaterally  Respiratory: decreased bs 	  Gastrointestinal:  Soft, Non-tender, + BS	  Skin: generalized rash  Musculoskeletal: Normal range of motion, normal strength  Psychiatry: Mood & affect appropriate  Ext: No edema    LABS:    CARDIAC MARKERS:    proBNP:   Lipid Profile:   HgA1c:   TSH:     TELEMETRY: SR   ECG:  	  RADIOLOGY:   DIAGNOSTIC TESTING:  [ ] Echocardiogram:  [ ]  Catheterization:  [ ] Stress Test:    OTHER:

## 2023-07-19 NOTE — DIETITIAN INITIAL EVALUATION ADULT - PERTINENT LABORATORY DATA
07-18    141  |  102  |  44<H>  ----------------------------<  132<H>  3.9   |  27  |  2.74<H>    Ca    8.6      18 Jul 2023 11:51    POCT Blood Glucose.: 227 mg/dL (07-19-23 @ 12:51)

## 2023-07-19 NOTE — DIETITIAN INITIAL EVALUATION ADULT - REASON FOR ADMISSION
Heart failure    Per chart, patient is a 72 y/o female with PMH including HTN, DM, h/o breast cancer, h/o respiratory & cardiac arrest (2018), h/o CVA w/ residual weakness, h/o trach/PEG (both since removed) and now takes PO diet but w/ presumed silent aspiration and post-prandial cough. Patient presents to [facility] w/ worsening cough, orthopnea, patient noted w/ chronic LE edema per MD. Admitted w/ aspiration PNA, acute on chronic CHF per MD.

## 2023-07-19 NOTE — DIETITIAN INITIAL EVALUATION ADULT - ORAL INTAKE PTA/DIET HISTORY
Patient's  confirms patient had a h/o trach/PEG which have since been removed. Chewing/swallowing difficulty acknowledged by patient's  and having SLP involvement assessing chewing/swallowing capacity. No nausea/vomiting reported. Patient's  confirms patient follows a lacto-ovo vegetarian diet.

## 2023-07-19 NOTE — PROGRESS NOTE ADULT - SUBJECTIVE AND OBJECTIVE BOX
Follow Up:  bronchiectasis    Interval History/ROS:  rare cough,  pruritic rash    Allergies  hydrALAZINE (Rash)  vitamin E (Short breath; Urticaria; Hives)  NIFEdipine (Urticaria; Hives)  doxycycline (Rash)  nafcillin (Unknown)  isoniazid (Rash)    ANTIMICROBIALS:      OTHER MEDS:  MEDICATIONS  (STANDING):  acetaminophen     Tablet .. 650 every 6 hours PRN  albuterol/ipratropium for Nebulization 3 every 6 hours  aspirin enteric coated 81 daily  atorvastatin 10 at bedtime  buDESOnide    Inhalation Suspension 0.5 every 12 hours  carvedilol 37.5 every 12 hours  cloNIDine 0.1 two times a day  dextrose 50% Injectable 25 once  dextrose 50% Injectable 12.5 once  dextrose 50% Injectable 25 once  dextrose Oral Gel 15 once PRN  donepezil 10 at bedtime  doxazosin 8 at bedtime  glucagon  Injectable 1 once  guaiFENesin Oral Liquid (Sugar-Free) 100 every 6 hours PRN  hydrOXYzine hydrochloride 25 every 6 hours PRN  insulin glargine Injectable (LANTUS) 12 at bedtime  insulin lispro (ADMELOG) corrective regimen sliding scale  at bedtime  insulin lispro (ADMELOG) corrective regimen sliding scale  three times a day before meals  insulin lispro Injectable (ADMELOG) 6 three times a day before meals  letrozole 2.5 daily  methylPREDNISolone sodium succinate Injectable 40 every 12 hours  polyethylene glycol 3350 17 two times a day  sodium chloride 0.9% for Nebulization 3 every 6 hours      Vital Signs Last 24 Hrs  T(C): 36.6 (19 Jul 2023 13:14), Max: 36.8 (18 Jul 2023 21:42)  T(F): 97.8 (19 Jul 2023 13:14), Max: 98.2 (18 Jul 2023 21:42)  HR: 82 (19 Jul 2023 17:25) (70 - 88)  BP: 158/66 (19 Jul 2023 17:25) (135/69 - 186/74)  BP(mean): --  RR: 18 (19 Jul 2023 13:14) (18 - 18)  SpO2: 97% (19 Jul 2023 13:14) (97% - 99%)    Parameters below as of 19 Jul 2023 13:14  Patient On (Oxygen Delivery Method): nasal cannula  O2 Flow (L/min): 2      PHYSICAL EXAM:  General: WN/WD NAD, Non-toxic  Neurology: somnolent  Respiratory: Clear to auscultation bilaterally, rare cough  CV: RRR, S1S2, no murmurs, rubs or gallops  Abdominal: Soft, Non-tender, non-distended, normal bowel sounds  Extremities: No edema,   Line Sites: Clear  Skin: diffuse macular erythematous rash                          9.6    12.95 )-----------( 198      ( 18 Jul 2023 11:51 )             32.5       07-18    141  |  102  |  44<H>  ----------------------------<  132<H>  3.9   |  27  |  2.74<H>    Ca    8.6      18 Jul 2023 11:51        Urinalysis Basic - ( 18 Jul 2023 11:51 )    Color: x / Appearance: x / SG: x / pH: x  Gluc: 132 mg/dL / Ketone: x  / Bili: x / Urobili: x   Blood: x / Protein: x / Nitrite: x   Leuk Esterase: x / RBC: x / WBC x   Sq Epi: x / Non Sq Epi: x / Bacteria: x        MICROBIOLOGY:  .Sputum Sputum  07-16-23   Culture is being performed.  --  --      .Blood Blood-Catheter  07-15-23   No growth at 4 days  --  --      .Blood Blood-Catheter  07-15-23   No growth at 4 days  --  --      .Sputum Sputum  07-15-23   Culture is being performed.  --  --      .Sputum Sputum  07-14-23   Culture is being performed.  --  --      .Sputum Sputum  07-14-23   Culture is being performed.  --  --    RADIOLOGY:  < from: CT Head No Cont (07.17.23 @ 22:44) >  IMPRESSION: No acute intracranial findings.    Chronic left occipital lobe infarct an similar-appearing mild chronic   white matter microvascular type changes.    < end of copied text >      Jas Durand MD; Division of Infectious Disease; Pager: [phone]; nights and weekends: [phone]

## 2023-07-19 NOTE — DIETITIAN INITIAL EVALUATION ADULT - NS FNS DIET ORDER
Diet, Soft and Bite Sized:   Mildly Thick Liquids (MILDTHICKLIQS)  Ovo Vegetarian (Accepts Eggs)  No Straws (07-14-23 @ 18:31) [Active]

## 2023-07-19 NOTE — DIETITIAN INITIAL EVALUATION ADULT - PERSON TAUGHT/METHOD
adequate caloric/protein intake, current diet order, SLP recommendations, role of oral nutrition supplements, food preferences/verbal instruction/teach back - (Patient repeats in own words)/spouse instructed

## 2023-07-20 NOTE — PROGRESS NOTE ADULT - ASSESSMENT
73F admitted 7/12/23 for  with a PMH significant for respiratory distress in the setting of possible Aspiration pneumonia and CHF exacerbation.      Latent TB, In April 2017 was provided with INH - developed rash after 6 weeks and changed to other medication  (?ifampim) for additonal 3 months as per martinez  Breast Cancer 2/17  Aspiration 2/2 CVA  DM  CHF    Previously hospitalized   March 2018 with flu, resp arrest RUSS  MSSA bacteremia  - has PEA, trach and PEG  5/12 --> 5/22/18  Hospitalized with bradycardia  - has superficial abscess on back  I&D: no grwoth  Rx: Zosyn 5/13 --> 5/16/18 12/29/21 --> 1/4/22    was treated with CIPRO/Flagyl 12/30/21 --> 1/5/22  -   daughter states that she developed CDIFF after discharge     proBNP on arrival to ED was 8075 diuresed w/ Lasix; Cardiology and Pulm following  Today  patient is ill appearing with frequent cough  7/12/23 Chest CT notable for "Upper lung predominant bilateral multifocal consolidation with branching   tubular opacities, suggestive of endobronchial spread of infection.   Interval right apical thick-walled cyst since 2019. TB should be   considered in the appropriate clinical setting"  7/14 fatigued     developed pruritic rash and somnolence  Sputum AFB  NEG x3: 7/14, 7/15, 7/16   7/15 BC x2 NGTD  7/17 aspiration noted  7/18, 7/19 macular rash persists  -- no resp decompensation off antibiotics  Methylprednisolone 40 every 12  hours  7/18 -->  7/19 hyperglycemia  7/20 wbc = 11.95;  6.2% eos    Antibiotics  Cipro  7/13  Flagyl  7/13  Cefepime 7/13-->7/15  Zosyn 7/26-->17    SUGGEST  monitor off antibiotics

## 2023-07-20 NOTE — PROGRESS NOTE ADULT - SUBJECTIVE AND OBJECTIVE BOX
Subjective: Patient seen and examined. No new events except as noted.    at bedside.  Pt awake and alert, eating lunch.     REVIEW OF SYSTEMS:    CONSTITUTIONAL: + weakness, fevers or chills  EYES/ENT: No visual changes;  No vertigo or throat pain   NECK: No pain or stiffness  RESPIRATORY: No cough, wheezing, hemoptysis; No shortness of breath  CARDIOVASCULAR: No chest pain or palpitations  GASTROINTESTINAL: No abdominal or epigastric pain. No nausea, vomiting, or hematemesis; No diarrhea or constipation. No melena or hematochezia.  GENITOURINARY: No dysuria, frequency or hematuria  NEUROLOGICAL: No numbness or weakness  SKIN: No itching, burning, rashes, or lesions   All other review of systems is negative unless indicated above.    MEDICATIONS:  MEDICATIONS  (STANDING):  albuterol/ipratropium for Nebulization 3 milliLiter(s) Nebulizer every 6 hours  aspirin enteric coated 81 milliGRAM(s) Oral daily  atorvastatin 10 milliGRAM(s) Oral at bedtime  buDESOnide    Inhalation Suspension 0.5 milliGRAM(s) Inhalation every 12 hours  carvedilol 37.5 milliGRAM(s) Oral every 12 hours  chlorhexidine 2% Cloths 1 Application(s) Topical <User Schedule>  cholecalciferol 2000 Unit(s) Oral daily  cloNIDine 0.1 milliGRAM(s) Oral two times a day  dextrose 5%. 1000 milliLiter(s) (100 mL/Hr) IV Continuous <Continuous>  dextrose 5%. 1000 milliLiter(s) (50 mL/Hr) IV Continuous <Continuous>  dextrose 50% Injectable 25 Gram(s) IV Push once  dextrose 50% Injectable 12.5 Gram(s) IV Push once  dextrose 50% Injectable 25 Gram(s) IV Push once  donepezil 10 milliGRAM(s) Oral at bedtime  doxazosin 8 milliGRAM(s) Oral at bedtime  fluticasone propionate 50 MICROgram(s)/spray Nasal Spray 1 Spray(s) Both Nostrils two times a day  glucagon  Injectable 1 milliGRAM(s) IntraMuscular once  hydrocortisone 1% Cream 1 Application(s) Topical two times a day  insulin glargine Injectable (LANTUS) 21 Unit(s) SubCutaneous at bedtime  insulin lispro (ADMELOG) corrective regimen sliding scale   SubCutaneous three times a day before meals  insulin lispro Injectable (ADMELOG) 8 Unit(s) SubCutaneous three times a day before meals  lactated ringers. 500 milliLiter(s) (50 mL/Hr) IV Continuous <Continuous>  letrozole 2.5 milliGRAM(s) Oral daily  Nephro-stacia 1 Tablet(s) Oral daily  piperacillin/tazobactam IVPB.. 3.375 Gram(s) IV Intermittent every 12 hours  polyethylene glycol 3350 17 Gram(s) Oral two times a day  sodium chloride 0.9% for Nebulization 3 milliLiter(s) Nebulizer every 6 hours    PHYSICAL EXAM:  Vital Signs Last 24 Hrs  T(C): 36.7 (20 Jul 2023 13:08), Max: 36.7 (20 Jul 2023 04:47)  T(F): 98.1 (20 Jul 2023 13:08), Max: 98.1 (20 Jul 2023 13:08)  HR: 69 (20 Jul 2023 13:08) (62 - 82)  BP: 152/76 (20 Jul 2023 13:08) (135/65 - 158/66)  BP(mean): --  RR: 18 (20 Jul 2023 13:08) (18 - 18)  SpO2: 99% (20 Jul 2023 13:08) (98% - 99%)    Parameters below as of 20 Jul 2023 13:08  Patient On (Oxygen Delivery Method): nasal cannula  O2 Flow (L/min): 1    I&O's Summary    19 Jul 2023 07:01  -  20 Jul 2023 07:00  --------------------------------------------------------  IN: 100 mL / OUT: 0 mL / NET: 100 mL    Appearance: NAD  HEENT: Dry oral mucosa, PERRL, EOMI	  Lymphatic: No lymphadenopathy , no edema  Cardiovascular: Normal S1 S2, No JVD, No murmurs , Peripheral pulses palpable 2+ bilaterally  Respiratory: decreased bs 	  Gastrointestinal:  Soft, Non-tender, + BS	  Skin: generalized rash  Musculoskeletal: Normal range of motion, normal strength  Psychiatry: Mood & affect appropriate  Ext: No edema    LABS:    CARDIAC MARKERS:    proBNP:   Lipid Profile:   HgA1c:   TSH:     TELEMETRY: SR   ECG:  	  RADIOLOGY:   DIAGNOSTIC TESTING:  [ ] Echocardiogram:  [ ]  Catheterization:  [ ] Stress Test:    OTHER:

## 2023-07-20 NOTE — PROGRESS NOTE ADULT - SUBJECTIVE AND OBJECTIVE BOX
Follow Up:  rash    Interval History/ROS:  awake,   at bedside preventing pt from scratching herself    Allergies  hydrALAZINE (Rash)  vitamin E (Short breath; Urticaria; Hives)  NIFEdipine (Urticaria; Hives)  doxycycline (Rash)  nafcillin (Unknown)  isoniazid (Rash)    ANTIMICROBIALS:      OTHER MEDS:  MEDICATIONS  (STANDING):  acetaminophen     Tablet .. 650 every 6 hours PRN  albuterol/ipratropium for Nebulization 3 every 6 hours  aspirin enteric coated 81 daily  atorvastatin 10 at bedtime  buDESOnide    Inhalation Suspension 0.5 every 12 hours  carvedilol 37.5 every 12 hours  cloNIDine 0.1 two times a day  dextrose 50% Injectable 25 once  dextrose 50% Injectable 25 once  dextrose 50% Injectable 12.5 once  dextrose Oral Gel 15 once PRN  donepezil 10 at bedtime  doxazosin 8 at bedtime  glucagon  Injectable 1 once  guaiFENesin Oral Liquid (Sugar-Free) 100 every 6 hours PRN  hydrOXYzine hydrochloride 25 every 6 hours PRN  insulin glargine Injectable (LANTUS) 28 at bedtime  insulin lispro (ADMELOG) corrective regimen sliding scale  at bedtime  insulin lispro (ADMELOG) corrective regimen sliding scale  three times a day before meals  insulin lispro Injectable (ADMELOG) 9 three times a day before meals  letrozole 2.5 daily  methylPREDNISolone sodium succinate Injectable 40 every 12 hours  polyethylene glycol 3350 17 two times a day  sodium chloride 0.9% for Nebulization 3 every 6 hours      Vital Signs Last 24 Hrs  T(C): 36.7 (20 Jul 2023 13:08), Max: 36.7 (20 Jul 2023 04:47)  T(F): 98.1 (20 Jul 2023 13:08), Max: 98.1 (20 Jul 2023 13:08)  HR: 69 (20 Jul 2023 13:08) (62 - 82)  BP: 152/76 (20 Jul 2023 13:08) (135/65 - 158/66)  BP(mean): --  RR: 18 (20 Jul 2023 13:08) (18 - 18)  SpO2: 99% (20 Jul 2023 13:08) (98% - 99%)    Parameters below as of 20 Jul 2023 13:08  Patient On (Oxygen Delivery Method): nasal cannula  O2 Flow (L/min): 1      PHYSICAL EXAM:  General: WN/WD NAD, Non-toxic  Neurology: A&Ox3, nonfocal  Respiratory: Clear to auscultation bilaterally  CV: RRR, S1S2, no murmurs, rubs or gallops  Abdominal: Soft, Non-tender, non-distended, normal bowel sounds  Extremities: No edema,  Line Sites: Clear  Skin: diffuse macular rash  deeper erythema                          9.3    11.95 )-----------( 211      ( 20 Jul 2023 09:59 )             31.4       07-20    140  |  103  |  63<H>  ----------------------------<  238<H>  4.5   |  26  |  2.92<H>    Ca    8.8      20 Jul 2023 10:00    TPro  6.9  /  Alb  2.4<L>  /  TBili  0.2  /  DBili  x   /  AST  18  /  ALT  18  /  AlkPhos  82  07-20      Urinalysis Basic - ( 20 Jul 2023 10:00 )    Color: x / Appearance: x / SG: x / pH: x  Gluc: 238 mg/dL / Ketone: x  / Bili: x / Urobili: x   Blood: x / Protein: x / Nitrite: x   Leuk Esterase: x / RBC: x / WBC x   Sq Epi: x / Non Sq Epi: x / Bacteria: x      MICROBIOLOGY:  .Sputum Sputum  07-16-23   Culture is being performed.  --  --      .Blood Blood-Catheter  07-15-23   No growth at 5 days  --  --      .Blood Blood-Catheter  07-15-23   No growth at 5 days  --  --      .Sputum Sputum  07-15-23   Culture is being performed.  --  --      .Sputum Sputum  07-14-23   Culture is being performed.  --  --      .Sputum Sputum  07-14-23   Culture is being performed.  --  --          v          RADIOLOGY:    Jas Durand MD; Division of Infectious Disease; Pager: [phone]; nights and weekends: [phone]

## 2023-07-20 NOTE — PROGRESS NOTE ADULT - SUBJECTIVE AND OBJECTIVE BOX
Neurology Progress Note    S: Patient seen and examined.  at bedside; c/o rash mildly better. await derm     Medication:    MEDICATIONS  (STANDING):  albuterol/ipratropium for Nebulization 3 milliLiter(s) Nebulizer every 6 hours  aspirin enteric coated 81 milliGRAM(s) Oral daily  atorvastatin 10 milliGRAM(s) Oral at bedtime  buDESOnide    Inhalation Suspension 0.5 milliGRAM(s) Inhalation every 12 hours  carvedilol 37.5 milliGRAM(s) Oral every 12 hours  chlorhexidine 2% Cloths 1 Application(s) Topical <User Schedule>  cholecalciferol 2000 Unit(s) Oral daily  clobetasol 0.05% Ointment 1 Application(s) Topical two times a day  cloNIDine 0.1 milliGRAM(s) Oral two times a day  dextrose 5%. 1000 milliLiter(s) (50 mL/Hr) IV Continuous <Continuous>  dextrose 5%. 1000 milliLiter(s) (100 mL/Hr) IV Continuous <Continuous>  dextrose 50% Injectable 25 Gram(s) IV Push once  dextrose 50% Injectable 25 Gram(s) IV Push once  dextrose 50% Injectable 12.5 Gram(s) IV Push once  donepezil 10 milliGRAM(s) Oral at bedtime  doxazosin 8 milliGRAM(s) Oral at bedtime  ferrous    sulfate 325 milliGRAM(s) Oral daily  fluticasone propionate 50 MICROgram(s)/spray Nasal Spray 1 Spray(s) Both Nostrils two times a day  glucagon  Injectable 1 milliGRAM(s) IntraMuscular once  insulin glargine Injectable (LANTUS) 28 Unit(s) SubCutaneous at bedtime  insulin lispro (ADMELOG) corrective regimen sliding scale   SubCutaneous at bedtime  insulin lispro (ADMELOG) corrective regimen sliding scale   SubCutaneous three times a day before meals  insulin lispro Injectable (ADMELOG) 9 Unit(s) SubCutaneous three times a day before meals  letrozole 2.5 milliGRAM(s) Oral daily  methylPREDNISolone sodium succinate Injectable 40 milliGRAM(s) IV Push every 12 hours  Nephro-stacia 1 Tablet(s) Oral daily  polyethylene glycol 3350 17 Gram(s) Oral two times a day  sodium chloride 0.9% for Nebulization 3 milliLiter(s) Nebulizer every 6 hours  sodium chloride 0.9%. 1000 milliLiter(s) (100 mL/Hr) IV Continuous <Continuous>    MEDICATIONS  (PRN):  acetaminophen     Tablet .. 650 milliGRAM(s) Oral every 6 hours PRN Temp greater or equal to 38C (100.4F), Mild Pain (1 - 3), Moderate Pain (4 - 6)  dextrose Oral Gel 15 Gram(s) Oral once PRN Blood Glucose LESS THAN 70 milliGRAM(s)/deciliter  guaiFENesin Oral Liquid (Sugar-Free) 100 milliGRAM(s) Oral every 6 hours PRN Cough  hydrOXYzine hydrochloride 25 milliGRAM(s) Oral every 6 hours PRN Itching        Vitals:    Vital Signs Last 24 Hrs  T(C): 36.7 (20 Jul 2023 13:08), Max: 36.7 (20 Jul 2023 04:47)  T(F): 98.1 (20 Jul 2023 13:08), Max: 98.1 (20 Jul 2023 13:08)  HR: 67 (20 Jul 2023 17:37) (62 - 72)  BP: 168/75 (20 Jul 2023 17:37) (135/65 - 168/75)  BP(mean): --  RR: 18 (20 Jul 2023 13:08) (18 - 18)  SpO2: 99% (20 Jul 2023 13:08) (98% - 99%)    Parameters below as of 20 Jul 2023 13:08  Patient On (Oxygen Delivery Method): nasal cannula  O2 Flow (L/min): 1      General Exam:   General Appearance: Appropriately dressed and in no acute distress       Head: Normocephalic, atraumatic and no dysmorphic features  Ear, Nose, and Throat: Moist mucous membranes  CVS: S1S2+  Resp: No SOB, no wheeze or rhonchi  Abd: soft NTND  Extremities: No edema, no cyanosis  Skin: + rash     Neurological Exam:    Neurological Exam:  Mental Status: Awake, alert and oriented x 1 (more lethargic 7/17.  not following commands. minimal verbal   Cranial Nerves: PERRL, EOMI, R HHA, sensation V1-V3 intact,  mild facial asymmetry, equal elevation of palate, scm/trap 5/5, tongue is midline on protrusion. no obvious papilledema on fundoscopic exam. hearing is grossly intact.   Motor: uppers 3-4/5, lowers 2-3/5 question of asterixes previously.  now not really participating [facility] exam   Sensation: Intact to light touch and pinprick throughout. no right/left confusion. no extinction to tactile on DSS.    Reflexes: 1+ throughout at biceps, brachioradialis, triceps, patellars and ankles bilaterally and equal. No clonus. R toe and L toe were both downgoing.  Coordination: No dysmetria on FNF  unable in lowers   Gait: wheelchair bound         I personally reviewed the below data/images/labs:  CBC Full  -  ( 20 Jul 2023 09:59 )  WBC Count : 11.95 K/uL  RBC Count : 3.18 M/uL  Hemoglobin : 9.3 g/dL  Hematocrit : 31.4 %  Platelet Count - Automated : 211 K/uL  Mean Cell Volume : 98.7 fl  Mean Cell Hemoglobin : 29.2 pg  Mean Cell Hemoglobin Concentration : 29.6 gm/dL  Auto Neutrophil # : 10.25 K/uL  Auto Lymphocyte # : 0.53 K/uL  Auto Monocyte # : 0.22 K/uL  Auto Eosinophil # : 0.74 K/uL  Auto Basophil # : 0.11 K/uL  Auto Neutrophil % : 85.8 %  Auto Lymphocyte % : 4.4 %  Auto Monocyte % : 1.8 %  Auto Eosinophil % : 6.2 %  Auto Basophil % : 0.9 %  07-20    140  |  103  |  63<H>  ----------------------------<  238<H>  4.5   |  26  |  2.92<H>    Ca    8.8      20 Jul 2023 10:00    TPro  6.9  /  Alb  2.4<L>  /  TBili  0.2  /  DBili  x   /  AST  18  /  ALT  18  /  AlkPhos  82  07-20      Urinalysis Basic - ( 16 Jul 2023 09:19 )    Color: x / Appearance: x / SG: x / pH: x  Gluc: 177 mg/dL / Ketone: x  / Bili: x / Urobili: x   Blood: x / Protein: x / Nitrite: x   Leuk Esterase: x / RBC: x / WBC x   Sq Epi: x / Non Sq Epi: x / Bacteria: x      < from: CT Head No Cont (07.17.23 @ 22:44) >    ACC: [id number] EXAM:  CT BRAIN   ORDERED BY: CUONG D PISHANIDAR     PROCEDURE DATE:  07/17/2023          INTERPRETATION:  .    CLINICAL INFORMATION: Altered mental status.    TECHNIQUE: Multiple axial CT images of the head were obtained without   contrast. Sagittal and coronal reconstructed images were acquired from   the source data.    COMPARISON: Prior brain MRI study from 2/6/2018. Prior CT study of the   head from 2/3/2018.    FINDINGS: Encephalomalacia and gliosis is seen within the left occipital   lobe is chronic infarction.    There is no acute intracranial hemorrhage, mass effect, shift of the   midline structures, herniation, hydrocephalus, or abnormal extra axial   fluid collection.    There is diffuse cerebral volume loss with prominence of the sulci,   fissures, and cisternal spaces which is normal for the patient's age.   There is mild periventricular white matter hypoattenuation statistically   compatible with microvascular type changes given calcific atherosclerotic   disease of the intracranial arteries.    The paranasal sinuses and right mastoid cavity is clear. There is under   pneumatization and sclerosis of the left hip in the mastoid cavity. Soft   tissue is seen within the left middle ear.    There is evidenceof bilateral cataract removal.    Multiple rounded lucencies in the skull are nonspecific.    IMPRESSION: No acute intracranial findings.    Chronic left occipital lobe infarct an similar-appearing mild chronic   white matter microvascular type changes.    --- End of Report ---            RAMÓN VIGIL MD; Attending Radiologist  This document has been electronically signed. Jul 18 2023  8:00AM    < end of copied text >

## 2023-07-20 NOTE — PROGRESS NOTE ADULT - ASSESSMENT
72 y/o female with multiple medical problems whom i see in my practice on outptn basis. last home visit marichuy 15   Ptn has h/o DM, HTN, breast CA, respiratory arrest and cardiac arrest (2018), CVA with residual weakness, h/o trache, PEG, both removed, now eating on her own but aspirates and has post prandial cough in addition to chronic productive cough. presumed 2/2 silent aspiration of her saliva      pt  presents to the ED with  complaining of worsening cough and orthopnea. ptn has chronic LE edema.       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 2 other specimens in lab - f/u results :  -she is lethargic: check ABG: : no sedatives:   -ABG with chr compensated hypercarbic resp failure:  no need for bipap:  not the reason fr ams: OR SLEEPINESS  -she is much m ore alert and awake today  :  -no new complaints:   RASH:   -new rash  :  -on steroids cream :   - cefepime changed to zosyn: id following  antibiotics to be finished tomorrow   -completed course now  acute on chronic CHF  -echo noted:   -defer to primary cards team   RUSS   -she likely has ac on chr CKD:  -she always had high creat before:   HTN  -her blood pressure is slightly had:   -cont to optimize anti hypertensives:   DM  monitor and control :    dvt prophylaxis : for dc planning : [id number] need to check for home oxygen requirement:  per  they do not have oxygen at home:  wean oxygen to off today

## 2023-07-20 NOTE — CONSULT NOTE ADULT - REASON FOR ADMISSION
respiratory distress

## 2023-07-20 NOTE — CONSULT NOTE ADULT - ASSESSMENT
A/P:   #Favor Morbilliform drug exanthem   - Recommend continuing clobetasol ointment bid to affected areas BID for two weeks.   - Topical corticosteroids only helpful if the patient has itch. It will not modify the course of the rash  - For the scalp, fluocinonide solution can be applied BID (avoid use on the face) for 2 weeks.   - This type of drug rash often lasts for 1-2 weeks and may desquamate later in the course, which will appear as a small amount of skin peeling similar to a sunburn. The rash may spread more to the distal extremities and may become darker in color before it starts to improve. Blistering, severe skin pain, mouth pain/sores, dysuria, pain with defecation, and large sheets of skin detachment are never normal for this type of rash. Please call dermatology if any of these other symptoms develop.  - Recommend daily CBC with diff, and CMP for evaluation of possible systemic signs of DRESS.  - This type of rash develops 4-14 days after exposure to culprit medication.   - Given patient recently starting various medications around this time, it is difficult to identify clear culprit at this time. However, unless patient develops signs or symptoms concerning for more serious drug rash, there is no need to change any treatments he may be on at this time.     - Seen at bedside today with derm attending Dr. Bryant.  - Rash improved since yesterday. Still occasionally scratching.   -- patient afebrile during hospital stay, mild eosinophilia today, LFTs wnl, and Cr increasing (being followed by nephrology)  - Discussed with the primary team need to please request more tubes of clobetasol to cover surface area.  - Discussed with the patient’s  at bedside that given multiple meds its hard to determine with certainty a culprit med- but at this time we favor cefepime to be the most likely culprit. Cannot rule out the possibility that the furosemide may be the culprit, however.   - Recommend outpatient fu within 2 weeks after discharge and explained if the patient’s rash worsens or develops a fever, to reach out to our office and if severe to come to the ED.  verbalized understanding.   - - systemic steroid taper as per primary team    For  outpatient follow up after discharge:  Discussed with the patient's husbant to call and request to speak with Dr. Bryant regarding a telemed apt or any questions  Clinic address below.   [facility] [place]  [address] [facility], [place] [address]  [phone]    The patient's chart was reviewed in addition to photos of the rash taken by the primary team with the permission of the patient.  Patient was seen at bedside  with the dermatology attending Dr. Bryant  Recommendations were communicated with the primary team.  Please page [phone] for further related questions.    Clifton Cardenas MD  Resident Physician, PGY3  [facility]  Pager: [phone]

## 2023-07-20 NOTE — PROVIDER CONTACT NOTE (OTHER) - ACTION/TREATMENT ORDERED:
provider came bedside and assessed pt. cefepime changed to zosyn. hydrocortisone cream prescribed
ACP made aware, hold meds for now. Dr. Brady will have GOC meeting with family.
Provider advised to not administer oral meds due to lethargy + IV Fluid LR 500ml ordered at 50ml/hr + check back w/ vitals at midnight for alternative of iv bp meds if SBP >160.
Provider notified and aware. Education provided.

## 2023-07-20 NOTE — PROGRESS NOTE ADULT - SUBJECTIVE AND OBJECTIVE BOX
Podiatry pager #: [phone]/ [phone]    Patient is a 73y old  Female who presents with a chief complaint of respiratory distress (20 Jul 2023 19:47)Podiatry consultation for evaluation of right heel wound      HPI:  74 y/o female with multiple medical problems whom i see in my practice on outptn basis. last home visit marichuy 15   Ptn has h/o DM, HTN, breast CA, respiratory arrest and cardiac arrest (2018), CVA with residual weakness, h/o trache, PEG, both removed, now eating on her own but aspirates and has post prandial cough in addition to chronic productive cough. presumed 2/2 silent aspiration of her saliva this was d/w Dr. Jose Almendarez who is in agreement    Today ptn presents to the ED with  complaining of worsening cough and orthopnea. ptn has chronic LE edema.   Denies any headache, fever, chills, chest pain, abdominal pain, n/v/d, dysuria. Unable to obtain any history or ROS due to mental status.   (12 Jul 2023 19:28)      PAST MEDICAL & SURGICAL HISTORY:  Breast CA      Diabetes      Stroke      Cardiac arrest      HTN (hypertension)      H/O mastectomy, bilateral          MEDICATIONS  (STANDING):  albuterol/ipratropium for Nebulization 3 milliLiter(s) Nebulizer every 6 hours  aspirin enteric coated 81 milliGRAM(s) Oral daily  atorvastatin 10 milliGRAM(s) Oral at bedtime  buDESOnide    Inhalation Suspension 0.5 milliGRAM(s) Inhalation every 12 hours  carvedilol 37.5 milliGRAM(s) Oral every 12 hours  chlorhexidine 2% Cloths 1 Application(s) Topical <User Schedule>  cholecalciferol 2000 Unit(s) Oral daily  clobetasol 0.05% Ointment 1 Application(s) Topical two times a day  cloNIDine 0.1 milliGRAM(s) Oral two times a day  dextrose 5%. 1000 milliLiter(s) (100 mL/Hr) IV Continuous <Continuous>  dextrose 5%. 1000 milliLiter(s) (50 mL/Hr) IV Continuous <Continuous>  dextrose 50% Injectable 25 Gram(s) IV Push once  dextrose 50% Injectable 12.5 Gram(s) IV Push once  dextrose 50% Injectable 25 Gram(s) IV Push once  donepezil 10 milliGRAM(s) Oral at bedtime  doxazosin 8 milliGRAM(s) Oral at bedtime  ferrous    sulfate 325 milliGRAM(s) Oral daily  fluticasone propionate 50 MICROgram(s)/spray Nasal Spray 1 Spray(s) Both Nostrils two times a day  glucagon  Injectable 1 milliGRAM(s) IntraMuscular once  insulin glargine Injectable (LANTUS) 28 Unit(s) SubCutaneous at bedtime  insulin lispro (ADMELOG) corrective regimen sliding scale   SubCutaneous at bedtime  insulin lispro (ADMELOG) corrective regimen sliding scale   SubCutaneous three times a day before meals  insulin lispro Injectable (ADMELOG) 9 Unit(s) SubCutaneous three times a day before meals  letrozole 2.5 milliGRAM(s) Oral daily  Nephro-stacia 1 Tablet(s) Oral daily  polyethylene glycol 3350 17 Gram(s) Oral two times a day  predniSONE   Tablet   Oral   sodium chloride 0.9% for Nebulization 3 milliLiter(s) Nebulizer every 6 hours  sodium chloride 0.9%. 1000 milliLiter(s) (100 mL/Hr) IV Continuous <Continuous>    MEDICATIONS  (PRN):  acetaminophen     Tablet .. 650 milliGRAM(s) Oral every 6 hours PRN Temp greater or equal to 38C (100.4F), Mild Pain (1 - 3), Moderate Pain (4 - 6)  dextrose Oral Gel 15 Gram(s) Oral once PRN Blood Glucose LESS THAN 70 milliGRAM(s)/deciliter  guaiFENesin Oral Liquid (Sugar-Free) 100 milliGRAM(s) Oral every 6 hours PRN Cough  hydrOXYzine hydrochloride 25 milliGRAM(s) Oral every 6 hours PRN Itching      Allergies    hydrALAZINE (Rash)  vitamin E (Short breath; Urticaria; Hives)  NIFEdipine (Urticaria; Hives)  doxycycline (Rash)  nafcillin (Unknown)  isoniazid (Rash)    Intolerances        VITALS:    Vital Signs Last 24 Hrs  T(C): 36.9 (20 Jul 2023 21:46), Max: 36.9 (20 Jul 2023 21:46)  T(F): 98.4 (20 Jul 2023 21:46), Max: 98.4 (20 Jul 2023 21:46)  HR: 74 (21 Jul 2023 06:56) (62 - 74)  BP: 171/70 (21 Jul 2023 06:56) (152/76 - 181/72)  BP(mean): --  RR: 187 (21 Jul 2023 06:56) (18 - 187)  SpO2: 95% (21 Jul 2023 06:56) (95% - 99%)    Parameters below as of 21 Jul 2023 06:56  Patient On (Oxygen Delivery Method): nasal cannula  O2 Flow (L/min): 1      LABS:                          9.3    11.95 )-----------( 211      ( 20 Jul 2023 09:59 )             31.4       07-20    140  |  103  |  63<H>  ----------------------------<  238<H>  4.5   |  26  |  2.92<H>    Ca    8.8      20 Jul 2023 10:00    TPro  6.9  /  Alb  2.4<L>  /  TBili  0.2  /  DBili  x   /  AST  18  /  ALT  18  /  AlkPhos  82  07-20      CAPILLARY BLOOD GLUCOSE      POCT Blood Glucose.: 185 mg/dL (20 Jul 2023 21:39)  POCT Blood Glucose.: 200 mg/dL (20 Jul 2023 16:49)  POCT Blood Glucose.: 270 mg/dL (20 Jul 2023 12:15)  POCT Blood Glucose.: 264 mg/dL (20 Jul 2023 08:22)          LOWER EXTREMITY PHYSICAL EXAM:    Vasular: DP/PT _1/4, B/L, CFT <_ 2seconds B/L, Temperature gradient _wnl, B/L.   Neuro: Epicritic sensation diminished_ to the level of toes_, B/L.  Skin:  Wound #1:   Location: right heel  Size: 2 cm diameter  Depth:superficial  Wound bed: no break in skin  Drainage: non fluctuant, none  Odor: none  Periwound: no clinical signs of infection  Etiology: present on admission    RADIOLOGY & ADDITIONAL STUDIES:

## 2023-07-20 NOTE — PROVIDER CONTACT NOTE (OTHER) - SITUATION
pt has rash on legs, neck, stomach
Pt's Daughter refuses AM labs, IV access and AM clonidine 0.1mg.
pt lethargy, unable to tolerate PO intake of fluids/food/meds
f/u pt lethargy + lack of intake of fluids/food

## 2023-07-20 NOTE — PROVIDER CONTACT NOTE (OTHER) - RECOMMENDATIONS
Notify provider.
notify provider
change IV abx because it could be allergic reaction
hold PO meds and notify ACP

## 2023-07-20 NOTE — PROGRESS NOTE ADULT - ASSESSMENT
74 y/o female with multiple medical problems whom i see in my practice on outptn basis. last home visit marichuy 15  Ptn has h/o DM, HTN, breast CA, respiratory arrest and cardiac arrest (2018), CVA with residual weakness, h/o trache, PEG, both removed, now eating on her own but aspirates and has post prandial cough in addition to chronic productive cough  seen in my office two weeks with worsening leg swelling   started lasix

## 2023-07-20 NOTE — PROGRESS NOTE ADULT - SUBJECTIVE AND OBJECTIVE BOX
Overnight events noted      VITAL:  T(C): , Max: 36.7 (07-20-23 @ 04:47)  T(F): , Max: 98 (07-20-23 @ 04:47)  HR: 72 (07-20-23 @ 04:47)  BP: 135/65 (07-20-23 @ 04:47)  BP(mean): --  RR: 18 (07-20-23 @ 04:47)  SpO2: 98% (07-20-23 @ 04:47)      PHYSICAL EXAM:  Constitutional: lethargic, NAD  HEENT: NCAT, DMM  Neck: Supple, No JVD  Respiratory: CTA-b/l  Cardiovascular: RRR s1s2, no m/r/g  Gastrointestinal: BS+, soft, NT/ND  Extremities: No peripheral edema b/l  Neurological: no focal deficits; strength grossly intact  Back: no CVAT b/l  Skin: No rashes, no nevi      LABS:                        9.6    12.95 )-----------( 198      ( 18 Jul 2023 11:51 )             32.5     Na(141)/K(3.9)/Cl(102)/HCO3(27)/BUN(44)/Cr(2.74)Glu(132)/Ca(8.6)/Mg(--)/PO4(--)    07-18 @ 11:51      IMPRESSION: 73F w/ HTN, DM2, CVA, breast CA-b/l mastectomy, and CAC-trach/reversal, 7/12/23 p/w cough/sob    (1)Renal - CKD4 with nephrotic-range proteinuria - highly likely from diabetic nephropathy. Mild rise in creatinine as of 7/18/23; presumed prerenally mediated. No bloodwork since 7/18.    (2)CV - stage 2 hypertension - acceptably controlled for now, on Coreg, Clonidine, and Cardura. Relative hypovolemia as of yesterday; s/p NS 100cc/h x 5h.     (3)Rash - receiving hydrocortisone cream and prn oral Hydroxyzine       RECOMMEND:  (1)BMP daily while admitted  (2)Repeat NS 100cc/h x 5h if creatinine today is 2.9 or higher  (3)Urine: lytes, creatinine, UA, if creatinine today is 2.9 or higher  (4)Meds as ordered/dose new meds for GFR 20-30  (5)No objection to discharge if creatinine 2.8 or lower; would have her f/u with Nephrology Dr. Cem Neely in 1-4 weeks        Jimmy Colon MD  [facility]  Office/on call physician: [phone]  Cell (7a-7p): [phone]       no pain, no sob      VITAL:  T(C): , Max: 36.7 (07-20-23 @ 04:47)  T(F): , Max: 98 (07-20-23 @ 04:47)  HR: 72 (07-20-23 @ 04:47)  BP: 135/65 (07-20-23 @ 04:47)  BP(mean): --  RR: 18 (07-20-23 @ 04:47)  SpO2: 98% (07-20-23 @ 04:47)      PHYSICAL EXAM:  Constitutional: lethargic, NAD  HEENT: NCAT, DMM  Neck: Supple, No JVD  Respiratory: CTA-b/l  Cardiovascular: RRR s1s2, no m/r/g  Gastrointestinal: BS+, soft, NT/ND  Extremities: No peripheral edema b/l  Neurological: no focal deficits; strength grossly intact  Back: no CVAT b/l  Skin: No rashes, no nevi      LABS:                        9.6    12.95 )-----------( 198      ( 18 Jul 2023 11:51 )             32.5     Na(141)/K(3.9)/Cl(102)/HCO3(27)/BUN(44)/Cr(2.74)Glu(132)/Ca(8.6)/Mg(--)/PO4(--)    07-18 @ 11:51      IMPRESSION: 73F w/ HTN, DM2, CVA, breast CA-b/l mastectomy, and CAC-trach/reversal, 7/12/23 p/w cough/sob    (1)Renal - CKD4 with nephrotic-range proteinuria - highly likely from diabetic nephropathy. Mild rise in creatinine as of 7/18/23; presumed prerenally mediated. No bloodwork since 7/18.    (2)CV - stage 2 hypertension - acceptably controlled for now, on Coreg, Clonidine, and Cardura. Relative hypovolemia as of yesterday; s/p NS 100cc/h x 5h.     (3)Rash - receiving hydrocortisone cream and prn oral Hydroxyzine       RECOMMEND:  (1)BMP daily while admitted  (2)Repeat NS 100cc/h x 5h if creatinine today is 2.9 or higher  (3)Urine: lytes, creatinine, UA, if creatinine today is 2.9 or higher  (4)Meds as ordered/dose new meds for GFR 20-30  (5)No objection to discharge if creatinine 2.8 or lower; would have her f/u with Nephrology Dr. Cem Neely in 1-4 weeks        Jimmy Colon MD  [facility]  Office/on call physician: [phone]  Cell (7a-7p): [phone]

## 2023-07-20 NOTE — PROGRESS NOTE ADULT - SUBJECTIVE AND OBJECTIVE BOX
Date of Service: 07-20-23 @ 12:58    Patient is a 73y old  Female who presents with a chief complaint of respiratory distress (20 Jul 2023 08:46)      Any change in ROS: sheis alert and awake:  on 2 L :    MEDICATIONS  (STANDING):  albuterol/ipratropium for Nebulization 3 milliLiter(s) Nebulizer every 6 hours  aspirin enteric coated 81 milliGRAM(s) Oral daily  atorvastatin 10 milliGRAM(s) Oral at bedtime  buDESOnide    Inhalation Suspension 0.5 milliGRAM(s) Inhalation every 12 hours  carvedilol 37.5 milliGRAM(s) Oral every 12 hours  chlorhexidine 2% Cloths 1 Application(s) Topical <User Schedule>  cholecalciferol 2000 Unit(s) Oral daily  clobetasol 0.05% Ointment 1 Application(s) Topical two times a day  cloNIDine 0.1 milliGRAM(s) Oral two times a day  dextrose 5%. 1000 milliLiter(s) (100 mL/Hr) IV Continuous <Continuous>  dextrose 5%. 1000 milliLiter(s) (50 mL/Hr) IV Continuous <Continuous>  dextrose 50% Injectable 25 Gram(s) IV Push once  dextrose 50% Injectable 12.5 Gram(s) IV Push once  dextrose 50% Injectable 25 Gram(s) IV Push once  donepezil 10 milliGRAM(s) Oral at bedtime  doxazosin 8 milliGRAM(s) Oral at bedtime  ferrous    sulfate 325 milliGRAM(s) Oral daily  fluticasone propionate 50 MICROgram(s)/spray Nasal Spray 1 Spray(s) Both Nostrils two times a day  glucagon  Injectable 1 milliGRAM(s) IntraMuscular once  insulin glargine Injectable (LANTUS) 28 Unit(s) SubCutaneous at bedtime  insulin lispro (ADMELOG) corrective regimen sliding scale   SubCutaneous at bedtime  insulin lispro (ADMELOG) corrective regimen sliding scale   SubCutaneous three times a day before meals  insulin lispro Injectable (ADMELOG) 9 Unit(s) SubCutaneous three times a day before meals  letrozole 2.5 milliGRAM(s) Oral daily  methylPREDNISolone sodium succinate Injectable 40 milliGRAM(s) IV Push every 12 hours  Nephro-stacia 1 Tablet(s) Oral daily  polyethylene glycol 3350 17 Gram(s) Oral two times a day  sodium chloride 0.9% for Nebulization 3 milliLiter(s) Nebulizer every 6 hours  sodium chloride 0.9%. 1000 milliLiter(s) (100 mL/Hr) IV Continuous <Continuous>    MEDICATIONS  (PRN):  acetaminophen     Tablet .. 650 milliGRAM(s) Oral every 6 hours PRN Temp greater or equal to 38C (100.4F), Mild Pain (1 - 3), Moderate Pain (4 - 6)  dextrose Oral Gel 15 Gram(s) Oral once PRN Blood Glucose LESS THAN 70 milliGRAM(s)/deciliter  guaiFENesin Oral Liquid (Sugar-Free) 100 milliGRAM(s) Oral every 6 hours PRN Cough  hydrOXYzine hydrochloride 25 milliGRAM(s) Oral every 6 hours PRN Itching    Vital Signs Last 24 Hrs  T(C): 36.7 (20 Jul 2023 04:47), Max: 36.7 (20 Jul 2023 04:47)  T(F): 98 (20 Jul 2023 04:47), Max: 98 (20 Jul 2023 04:47)  HR: 62 (20 Jul 2023 08:30) (62 - 82)  BP: 154/77 (20 Jul 2023 08:30) (135/65 - 158/66)  BP(mean): --  RR: 18 (20 Jul 2023 04:47) (18 - 18)  SpO2: 98% (20 Jul 2023 04:47) (97% - 98%)    Parameters below as of 20 Jul 2023 04:47  Patient On (Oxygen Delivery Method): nasal cannula  O2 Flow (L/min): 1      I&O's Summary    19 Jul 2023 07:01  -  20 Jul 2023 07:00  --------------------------------------------------------  IN: 100 mL / OUT: 0 mL / NET: 100 mL          Physical Exam:   GENERAL: NAD, well-groomed, well-developed  HEENT: MANDY/   Atraumatic, Normocephalic  ENMT: No tonsillar erythema, exudates, or enlargement; Moist mucous membranes, Good dentition, No lesions  NECK: Supple, No JVD, Normal thyroid  CHEST/LUNG: Clear to auscultaion  CVS: Regular rate and rhythm; No murmurs, rubs, or gallops  GI: : Soft, Nontender, Nondistended; Bowel sounds present  NERVOUS SYSTEM:  Alert & awake and responds  EXTREMITIES:  - edema  LYMPH: No lymphadenopathy noted  SKIN: rash+   ENDOCRINOLOGY: No Thyromegaly  PSYCH: Appropriate    Labs:                              9.3    11.95 )-----------( 211      ( 20 Jul 2023 09:59 )             31.4                         9.6    12.95 )-----------( 198      ( 18 Jul 2023 11:51 )             32.5     07-20    140  |  103  |  63<H>  ----------------------------<  238<H>  4.5   |  26  |  2.92<H>  07-18    141  |  102  |  44<H>  ----------------------------<  132<H>  3.9   |  27  |  2.74<H>    Ca    8.8      20 Jul 2023 10:00    TPro  6.9  /  Alb  2.4<L>  /  TBili  0.2  /  DBili  x   /  AST  18  /  ALT  18  /  AlkPhos  82  07-20    CAPILLARY BLOOD GLUCOSE      POCT Blood Glucose.: 270 mg/dL (20 Jul 2023 12:15)  POCT Blood Glucose.: 264 mg/dL (20 Jul 2023 08:22)  POCT Blood Glucose.: 254 mg/dL (19 Jul 2023 21:33)  POCT Blood Glucose.: 363 mg/dL (19 Jul 2023 18:44)  POCT Blood Glucose.: 401 mg/dL (19 Jul 2023 16:50)  POCT Blood Glucose.: 400 mg/dL (19 Jul 2023 16:49)      LIVER FUNCTIONS - ( 20 Jul 2023 10:00 )  Alb: 2.4 g/dL / Pro: 6.9 g/dL / ALK PHOS: 82 U/L / ALT: 18 U/L / AST: 18 U/L / GGT: x             Urinalysis Basic - ( 20 Jul 2023 10:00 )    Color: x / Appearance: x / SG: x / pH: x  Gluc: 238 mg/dL / Ketone: x  / Bili: x / Urobili: x   Blood: x / Protein: x / Nitrite: x   Leuk Esterase: x / RBC: x / WBC x   Sq Epi: x / Non Sq Epi: x / Bacteria: x            RECENT CULTURES:  07-16 @ 12:01 .Sputum Sputum                Culture is being performed.    07-15 @ 09:06 .Blood Blood-Catheter         rad< from: CT Head No Cont (07.17.23 @ 22:44) >  There is no acute intracranial hemorrhage, mass effect, shift of the   midline structures, herniation, hydrocephalus, or abnormal extra axial   fluid collection.    There is diffuse cerebral volume loss with prominence of the sulci,   fissures, and cisternal spaces which is normal for the patient's age.   There is mild periventricular white matter hypoattenuation statistically   compatible with microvascular type changes given calcific atherosclerotic   disease of the intracranial arteries.    The paranasal sinuses and right mastoid cavity is clear. There is under   pneumatization and sclerosis of the left hip in the mastoid cavity. Soft   tissue is seen within the left middle ear.    There is evidenceof bilateral cataract removal.    Multiple rounded lucencies in the skull are nonspecific.    IMPRESSION: No acute intracranial findings.    Chronic left occipital lobe infarct an similar-appearing mild chronic   white matter microvascular type changes.    --- End of Report ---          < end of copied text >  < from: CT Chest No Cont (07.12.23 @ 17:11) >  VISUALIZED UPPER ABDOMEN: This artery calcifications. The kidneys are   partially atrophic.    BONES/SOFT TISSUES: Bilateral mastectomies. Cardiac loop recorder.   Chronic L1 compression deformity, unchanged since December 2021 abdominal   CT. Old sternal and bilateral rib fractures may be related to prior   cardiopulmonary resuscitation. Otherwise no aggressive osseous lesion.    IMPRESSION:  Upper lung predominant bilateral multifocal consolidation with branching   tubular opacities, suggestive of endobronchial spread of infection.   Interval right apical thick-walled cyst since 2019. TB should be   considered in the appropriate clinical setting, among other infectious   etiologies.    Small right pleural effusion.    < end of copied text >         No growth at 4 days    07-15 @ 09:03 .Blood Blood-Catheter                No growth at 4 days    07-15 @ 06:38 .Sputum Sputum                Culture is being performed.    07-14 @ 21:21 .Sputum Sputum                Culture is being performed.    07-14 @ 07:25 .Sputum Sputum                Culture is being performed.          RESPIRATORY CULTURES:          Studies  Chest X-RAY  CT SCAN Chest   Venous Dopplers: LE:   CT Abdomen  Others

## 2023-07-20 NOTE — PROVIDER CONTACT NOTE (OTHER) - ASSESSMENT
AOx1 lethargy, unable to tolerate PO meds only took ~50% of meds and started coughing
A&O x1. VSS, no events on tele. No s/s of chest pain, SOB, palpitations or dizziness. Daughter at bedside.
Pt noted to be lethargic as per daughter mother is noted to be more sleepy last two days. Vitals: /71 HR 68 RR TEMP 98.5 SaO2 98% 2L NC 
pt A&O-2, denies sob, dizziness, cp or itching. pt VSS.

## 2023-07-20 NOTE — PROGRESS NOTE ADULT - ASSESSMENT
Assessment/plan:    Right heel DTI: Stable, noninfected, present on admission.  Diabetes mellitus.    Recommend decubitus precautions and use of Z flow boots.  Recommend Betadine paint and Allevyn foam dressing to right heel daily.  We will continue to monitor while in house for signs of infection and wound care recommendations

## 2023-07-20 NOTE — CONSULT NOTE ADULT - ATTENDING COMMENTS
Morbiliform drug rash, cefepime favored although started on multiple abx at same time. No evidence of systemic hypersensitivity, although labs and fever curve may be confounded by systemic steroids. Would stop steroids if admitted for few more days-- if being discharged would taper over 1 week.
73F admitted 7/12/23 for  with a PMH significant for respiratory distress in the setting of possible Aspiration pneumonia and CHF exacerbation.      Latent TB, In April 2017 was provided with INH - developed rash after 6 weeks and changed to other medication  (?ifampim) for additonal 3 months as per martinez  Breast Cancer 2/17  Aspiration 2/2 CVA  DM  CHF    Previously hospitalized   March 2018 with flu, resp arrest RUSS  MSSA bacteremia  - has PEA, trach and PEG  5/12 --> 5/22/18  Hospitalized with bradycardia  - has superficial abscess on back  I&D: no grwoth  Rx: Zosyn 5/13 --> 5/16/18 12/29/21 --> 1/4/22    was treated with CIPRO/Flagyl 12/30/21 --> 1/5/22  -   daughter states that she developed CDIFF after discharge     proBNP on arrival to ED was 8075 diuresed w/ Lasix; Cardiology and Pulm following  Today  patient is ill appearing with frequent cough  7/12/23 Chest CT notable for "Upper lung predominant bilateral multifocal consolidation with branching   tubular opacities, suggestive of endobronchial spread of infection.   Interval right apical thick-walled cyst since 2019. TB should be   considered in the appropriate clinical setting"    Antibiotics  Cipro  7/13  Flagyl  7/13  Cefepime 7/13-->    SUGGEST  - Isolate Patient w/ Airborne precaution  - Afb smear & Sputum Cx x3 3 consecutive days   - Obtain Blood Cx   - Obtain Sputum Cx  - Obtain HIV Test   - Switch Cipro and Flagyl to Cefepime 1g Q24 monotherapy      Case discussed with primary medical attending  Case discussed with  at bedside  Discussed with daughter by phone

## 2023-07-20 NOTE — PROGRESS NOTE ADULT - SUBJECTIVE AND OBJECTIVE BOX
Chief complaint  Patient is a 73y old  Female who presents with a chief complaint of respiratory distress (20 Jul 2023 12:58)         Labs and Fingersticks  CAPILLARY BLOOD GLUCOSE      POCT Blood Glucose.: 270 mg/dL (20 Jul 2023 12:15)  POCT Blood Glucose.: 264 mg/dL (20 Jul 2023 08:22)  POCT Blood Glucose.: 254 mg/dL (19 Jul 2023 21:33)  POCT Blood Glucose.: 363 mg/dL (19 Jul 2023 18:44)  POCT Blood Glucose.: 401 mg/dL (19 Jul 2023 16:50)  POCT Blood Glucose.: 400 mg/dL (19 Jul 2023 16:49)      Anion Gap: 11 (07-20 @ 10:00)      Calcium: 8.8 (07-20 @ 10:00)  Albumin: 2.4 *L* (07-20 @ 10:00)    Alanine Aminotransferase (ALT/SGPT): 18 (07-20 @ 10:00)  Alkaline Phosphatase: 82 (07-20 @ 10:00)  Aspartate Aminotransferase (AST/SGOT): 18 (07-20 @ 10:00)        07-20    140  |  103  |  63<H>  ----------------------------<  238<H>  4.5   |  26  |  2.92<H>    Ca    8.8      20 Jul 2023 10:00    TPro  6.9  /  Alb  2.4<L>  /  TBili  0.2  /  DBili  x   /  AST  18  /  ALT  18  /  AlkPhos  82  07-20                        9.3    11.95 )-----------( 211      ( 20 Jul 2023 09:59 )             31.4     Medications  MEDICATIONS  (STANDING):  albuterol/ipratropium for Nebulization 3 milliLiter(s) Nebulizer every 6 hours  aspirin enteric coated 81 milliGRAM(s) Oral daily  atorvastatin 10 milliGRAM(s) Oral at bedtime  buDESOnide    Inhalation Suspension 0.5 milliGRAM(s) Inhalation every 12 hours  carvedilol 37.5 milliGRAM(s) Oral every 12 hours  chlorhexidine 2% Cloths 1 Application(s) Topical <User Schedule>  cholecalciferol 2000 Unit(s) Oral daily  clobetasol 0.05% Ointment 1 Application(s) Topical two times a day  cloNIDine 0.1 milliGRAM(s) Oral two times a day  dextrose 5%. 1000 milliLiter(s) (100 mL/Hr) IV Continuous <Continuous>  dextrose 5%. 1000 milliLiter(s) (50 mL/Hr) IV Continuous <Continuous>  dextrose 50% Injectable 25 Gram(s) IV Push once  dextrose 50% Injectable 25 Gram(s) IV Push once  dextrose 50% Injectable 12.5 Gram(s) IV Push once  donepezil 10 milliGRAM(s) Oral at bedtime  doxazosin 8 milliGRAM(s) Oral at bedtime  ferrous    sulfate 325 milliGRAM(s) Oral daily  fluticasone propionate 50 MICROgram(s)/spray Nasal Spray 1 Spray(s) Both Nostrils two times a day  glucagon  Injectable 1 milliGRAM(s) IntraMuscular once  insulin glargine Injectable (LANTUS) 28 Unit(s) SubCutaneous at bedtime  insulin lispro (ADMELOG) corrective regimen sliding scale   SubCutaneous at bedtime  insulin lispro (ADMELOG) corrective regimen sliding scale   SubCutaneous three times a day before meals  insulin lispro Injectable (ADMELOG) 9 Unit(s) SubCutaneous three times a day before meals  letrozole 2.5 milliGRAM(s) Oral daily  methylPREDNISolone sodium succinate Injectable 40 milliGRAM(s) IV Push every 12 hours  Nephro-stacia 1 Tablet(s) Oral daily  polyethylene glycol 3350 17 Gram(s) Oral two times a day  sodium chloride 0.9% for Nebulization 3 milliLiter(s) Nebulizer every 6 hours  sodium chloride 0.9%. 1000 milliLiter(s) (100 mL/Hr) IV Continuous <Continuous>      Physical Exam  General: Patient comfortable in bed   Vital Signs Last 12 Hrs  T(F): 98 (07-20-23 @ 04:47), Max: 98 (07-20-23 @ 04:47)  HR: 62 (07-20-23 @ 08:30) (62 - 72)  BP: 154/77 (07-20-23 @ 08:30) (135/65 - 154/77)  BP(mean): --  RR: 18 (07-20-23 @ 04:47) (18 - 18)  SpO2: 98% (07-20-23 @ 04:47) (98% - 98%)    CVS: S1S2   Respiratory: No wheezing, no crepitations  GI: Abdomen soft, bowel sounds positive  Musculoskeletal:  moves all extremities  : Voiding        Chief complaint  Patient is a 73y old  Female who presents with a chief complaint of respiratory distress (20 Jul 2023 12:58)         Labs and Fingersticks  CAPILLARY BLOOD GLUCOSE      POCT Blood Glucose.: 270 mg/dL (20 Jul 2023 12:15)  POCT Blood Glucose.: 264 mg/dL (20 Jul 2023 08:22)  POCT Blood Glucose.: 254 mg/dL (19 Jul 2023 21:33)  POCT Blood Glucose.: 363 mg/dL (19 Jul 2023 18:44)  POCT Blood Glucose.: 401 mg/dL (19 Jul 2023 16:50)  POCT Blood Glucose.: 400 mg/dL (19 Jul 2023 16:49)      Anion Gap: 11 (07-20 @ 10:00)      Calcium: 8.8 (07-20 @ 10:00)  Albumin: 2.4 *L* (07-20 @ 10:00)    Alanine Aminotransferase (ALT/SGPT): 18 (07-20 @ 10:00)  Alkaline Phosphatase: 82 (07-20 @ 10:00)  Aspartate Aminotransferase (AST/SGOT): 18 (07-20 @ 10:00)        07-20    140  |  103  |  63<H>  ----------------------------<  238<H>  4.5   |  26  |  2.92<H>    Ca    8.8      20 Jul 2023 10:00    TPro  6.9  /  Alb  2.4<L>  /  TBili  0.2  /  DBili  x   /  AST  18  /  ALT  18  /  AlkPhos  82  07-20                        9.3    11.95 )-----------( 211      ( 20 Jul 2023 09:59 )             31.4     Medications  MEDICATIONS  (STANDING):  albuterol/ipratropium for Nebulization 3 milliLiter(s) Nebulizer every 6 hours  aspirin enteric coated 81 milliGRAM(s) Oral daily  atorvastatin 10 milliGRAM(s) Oral at bedtime  buDESOnide    Inhalation Suspension 0.5 milliGRAM(s) Inhalation every 12 hours  carvedilol 37.5 milliGRAM(s) Oral every 12 hours  chlorhexidine 2% Cloths 1 Application(s) Topical <User Schedule>  cholecalciferol 2000 Unit(s) Oral daily  clobetasol 0.05% Ointment 1 Application(s) Topical two times a day  cloNIDine 0.1 milliGRAM(s) Oral two times a day  dextrose 5%. 1000 milliLiter(s) (100 mL/Hr) IV Continuous <Continuous>  dextrose 5%. 1000 milliLiter(s) (50 mL/Hr) IV Continuous <Continuous>  dextrose 50% Injectable 25 Gram(s) IV Push once  dextrose 50% Injectable 25 Gram(s) IV Push once  dextrose 50% Injectable 12.5 Gram(s) IV Push once  donepezil 10 milliGRAM(s) Oral at bedtime  doxazosin 8 milliGRAM(s) Oral at bedtime  ferrous    sulfate 325 milliGRAM(s) Oral daily  fluticasone propionate 50 MICROgram(s)/spray Nasal Spray 1 Spray(s) Both Nostrils two times a day  glucagon  Injectable 1 milliGRAM(s) IntraMuscular once  insulin glargine Injectable (LANTUS) 28 Unit(s) SubCutaneous at bedtime  insulin lispro (ADMELOG) corrective regimen sliding scale   SubCutaneous at bedtime  insulin lispro (ADMELOG) corrective regimen sliding scale   SubCutaneous three times a day before meals  insulin lispro Injectable (ADMELOG) 9 Unit(s) SubCutaneous three times a day before meals  letrozole 2.5 milliGRAM(s) Oral daily  methylPREDNISolone sodium succinate Injectable 40 milliGRAM(s) IV Push every 12 hours  Nephro-stacia 1 Tablet(s) Oral daily  polyethylene glycol 3350 17 Gram(s) Oral two times a day  sodium chloride 0.9% for Nebulization 3 milliLiter(s) Nebulizer every 6 hours  sodium chloride 0.9%. 1000 milliLiter(s) (100 mL/Hr) IV Continuous <Continuous>      Physical Exam  General: Patient comfortable in bed   Vital Signs Last 12 Hrs  T(F): 98 (07-20-23 @ 04:47), Max: 98 (07-20-23 @ 04:47)  HR: 62 (07-20-23 @ 08:30) (62 - 72)  BP: 154/77 (07-20-23 @ 08:30) (135/65 - 154/77)  BP(mean): --  RR: 18 (07-20-23 @ 04:47) (18 - 18)  SpO2: 98% (07-20-23 @ 04:47) (98% - 98%)    CVS: S1S2   Respiratory: No wheezing, no crepitations  GI: Abdomen soft, bowel sounds positive  Musculoskeletal:  moves all extremities  : Voiding

## 2023-07-20 NOTE — CONSULT NOTE ADULT - CONSULT REQUESTED DATE/TIME
13-Jul-2023 11:02
15-Jul-2023 07:38
19-Jul-2023 12:25
13-Jul-2023 11:08
13-Jul-2023 13:00
13-Jul-2023 07:25
13-Jul-2023 09:48

## 2023-07-20 NOTE — PROVIDER CONTACT NOTE (OTHER) - BACKGROUND
73 year old f presents to the ED with  complaining of worsening cough. He states that she is unable to lay flat in the bed due to shortness of breath. She also has worsening edema to b/l LE.
73 year old f presents to the ED with  complaining of worsening cough. He states that she is unable to lay flat in the bed due to shortness of breath. She also has worsening edema to b/l LE.
73 yr F. Admitted with Heart failure.
pt admitted for HF. pt getting cefepime for cough r/o aspiration PNA.
For information on Fall & Injury Prevention, visit [email or web address]

## 2023-07-20 NOTE — PROGRESS NOTE ADULT - ASSESSMENT
72 y/o female with DM, HTN, breast CA, respiratory arrest and cardiac arrest (2018), L PCA stroke 2018 s/p ILR, h/o trache, PEG, both removed, now eating on her own but aspirates and has post prandial cough in addition to chronic productive cough with c/f silent aspiration.      was walking with walker until ~Oct 2022 per daughter now wheelchair bound.   spoke iwth daughter at bedside who states for stroke she follows with house neurology Dr. Ramiro Garner and Renae Vargas for stroke as well as movement dr. Lance Vizcaino.   MRI brain 2018 B/L centrum semi ovale watershed infarcts   o/e AAOx1-2, mild dysarthira, RHHA follows simple commands, uppers 3-4/5 lowers 2-3/5, question of asterixes in uppers  states had MRI at [facility] radiology 4/2023 which was unchanged from prior imaging  NIHSS ~18 premrs 4  + RUSS   TTE done 7/13 7/17, more lethargic today.  c/f possible aspiration ; cefepime changed to zosyn. check CTH   CTH with chronic L occipital infarct   7/18 mental status improved compared to yesterday. family agrees.    no neuro changes     Impression:   1) L PCA stroke, ESUS s/p ILR ; also with bilateral centrum semiovale watershed infarcts   2) weakness likely multifactorial, deconditioning, LE edema, anemia, RUSS, prior stroke and cardiopulmnoary arrest, prior strokes now possible new infection/aspiration   3) Dementia     - f/u derm for rash   - c/w asa 81mg daily and statin therapy, atorvastating 10mg PO QHS for secondary stroke prevention  - treatment of infection/aspiration per primary team. was on cefepime ; concern for drug rash, changed to zosyn, now off   - for dementia she is on donepezil 10mg PO QHS  - can check b12, RPR, TSH for reversible causes of dementia   - on airborne precuations to r/o TB  now off AFB neg   - Hemoglobin A1c and lipid panel  - telemetry  - PT/OT/SS/SLP, OOBC  - check FS, glucose control <180  - GI/DVT ppx  - Differential diagnosis and plan of care discussed with patient and/or family and primary team  - Thank you for allowing me to participate in the care of this patient. Call with questions.   - spoke with daughter at bedside 7/15 and 7/17 and 7/18 ; spoke with  7/19 ; daughter 7/20   dc planning    follows with house neurology Dr. Ramiro Garner and Renae Vargas for stroke as well as movement dr. Lance Vizcaino. who she should follow up outpatient   Anoop Bianchi MD  Vascular Neurology  Office: [phone]

## 2023-07-20 NOTE — PROGRESS NOTE ADULT - ASSESSMENT
73F female h/o DM, HTN, breast CA, respiratory arrest and cardiac arrest (2018), CVA with residual weakness, h/o trache, PEG, being worked up for asp PNA.   Assessment  DMT2: 73y Female with DM T2 with hyperglycemia, A1C pending, was on oral meds and MIXED insulin at home, now on basal bolus insulin with coverage, blood sugars now  trending up, inconsistent meal intake.  On steroids, having steroid induced hyperglycemias.    Asp PNA: ?chronic aspiration, sputum cultures, abx. Aspiration precautions.   HTN: on antihypertensive medications, monitored, asymptomatic.  CKD: Monitor labs/BMP, renal dosing.     Discussed plan and management wit Dr Naresh Bales NP - TEAMS  Viraj Infante MD  Cell: [phone]  Office: [phone]             73F female h/o DM, HTN, breast CA, respiratory arrest and cardiac arrest (2018), CVA with residual weakness, h/o trache, PEG, being worked up for asp PNA.   Assessment  DMT2: 73y Female with DM T2 with hyperglycemia, A1C pending, was on oral meds and MIXED insulin at home, now on basal bolus insulin with coverage,  blood sugars now  trending up, inconsistent meal intake.  On steroids, having steroid induced hyperglycemias.    Asp PNA: ?chronic aspiration, sputum cultures, abx. Aspiration precautions.   HTN: on antihypertensive medications, monitored, asymptomatic.  CKD: Monitor labs/BMP, renal dosing.     Discussed plan and management wit Dr Naresh Bales NP - TEAMS  Viraj Inafnte MD  Cell: [phone]  Office: [phone]

## 2023-07-20 NOTE — CONSULT NOTE ADULT - CONSULT REASON
Pneumonia, TB Ruleout
resap distress
h/o stroke
Azotemia
Rash
DM2/Hyperglycemia  Glycemic control
SOB   Leg Edema

## 2023-07-20 NOTE — PROVIDER CONTACT NOTE (OTHER) - REASON
f//u on letharygy
Pt's Daughter refuses AM labs, IV access and AM clonidine 0.1mg
pt has generalized body rash
pt lethargic, unable to tolerate PO meds

## 2023-07-20 NOTE — CONSULT NOTE ADULT - SUBJECTIVE AND OBJECTIVE BOX
HPI:  74 y/o female with multiple medical problems whom i see in my practice on outptn basis. last home visit marichuy 15   Ptn has h/o DM, HTN, breast CA, respiratory arrest and cardiac arrest (2018), CVA with residual weakness, h/o trache, PEG, both removed, now eating on her own but aspirates and has post prandial cough in addition to chronic productive cough. presumed 2/2 silent aspiration of her saliva this was d/w Dr. Jose Almendarez who is in agreement    Today ptn presents to the ED with  complaining of worsening cough and orthopnea. ptn has chronic LE edema.   Denies any headache, fever, chills, chest pain, abdominal pain, n/v/d, dysuria. Unable to obtain any history or ROS due to mental status.   (12 Jul 2023 19:28)    Derm HPI:   73F admitted 7/12/23 for respiratory distress in the setting of possible Aspiration pneumonia and CHF exacerbation.  Derm consulted due to pruritic rash that began on lower abdomen on 7/15 and progressed to full body rash. Patient examined at bedside with  present.     Abx course during hospital stay as below:   Cipro  7/13  Flagyl  7/13  Cefepime 7/13-->7/15  Zosyn 7/26-->17    Daughter reports the only new med before admission was furosemide started 2 weeks ago.   Of note, pt with reported hx of rash after several medications in the past including : doxy, hydralazine, isoniazid, nafcillin, nifedipine, and vitamin E.      Primary team treating rash with Methylprednisolone 40 every 12  hours  which was started on 7/18.        PAST MEDICAL & SURGICAL HISTORY:  Breast CA      Diabetes      Stroke      Cardiac arrest      HTN (hypertension)      H/O mastectomy, bilateral          Review of Systems:  REVIEW OF SYSTEMS    unable to obtain ROS due to patient's mental status        MEDICATIONS  (STANDING):  albuterol/ipratropium for Nebulization 3 milliLiter(s) Nebulizer every 6 hours  aspirin enteric coated 81 milliGRAM(s) Oral daily  atorvastatin 10 milliGRAM(s) Oral at bedtime  buDESOnide    Inhalation Suspension 0.5 milliGRAM(s) Inhalation every 12 hours  carvedilol 37.5 milliGRAM(s) Oral every 12 hours  chlorhexidine 2% Cloths 1 Application(s) Topical <User Schedule>  cholecalciferol 2000 Unit(s) Oral daily  clobetasol 0.05% Ointment 1 Application(s) Topical two times a day  cloNIDine 0.1 milliGRAM(s) Oral two times a day  dextrose 5%. 1000 milliLiter(s) IV Continuous <Continuous>  dextrose 5%. 1000 milliLiter(s) IV Continuous <Continuous>  dextrose 50% Injectable 25 Gram(s) IV Push once  dextrose 50% Injectable 25 Gram(s) IV Push once  dextrose 50% Injectable 12.5 Gram(s) IV Push once  donepezil 10 milliGRAM(s) Oral at bedtime  doxazosin 8 milliGRAM(s) Oral at bedtime  ferrous    sulfate 325 milliGRAM(s) Oral daily  fluticasone propionate 50 MICROgram(s)/spray Nasal Spray 1 Spray(s) Both Nostrils two times a day  glucagon  Injectable 1 milliGRAM(s) IntraMuscular once  insulin glargine Injectable (LANTUS) 28 Unit(s) SubCutaneous at bedtime  insulin lispro (ADMELOG) corrective regimen sliding scale   SubCutaneous at bedtime  insulin lispro (ADMELOG) corrective regimen sliding scale   SubCutaneous three times a day before meals  insulin lispro Injectable (ADMELOG) 9 Unit(s) SubCutaneous three times a day before meals  letrozole 2.5 milliGRAM(s) Oral daily  Nephro-stacia 1 Tablet(s) Oral daily  polyethylene glycol 3350 17 Gram(s) Oral two times a day  sodium chloride 0.9% for Nebulization 3 milliLiter(s) Nebulizer every 6 hours  sodium chloride 0.9%. 1000 milliLiter(s) IV Continuous <Continuous>    ALLERGIES: hydrALAZINE  vitamin E  NIFEdipine  doxycycline  nafcillin  isoniazid        SOCIAL HISTORY:  ____________________________________  Social History:    FAMILY HISTORY:  No pertinent family history in first degree relatives          VITAL SIGNS LAST 24 HOURS:  T(F): 98.4 (07-20 @ 21:46), Max: 98.4 (07-20 @ 21:46)  HR: 65 (07-20 @ 21:46) (62 - 72)  BP: 181/72 (07-20 @ 21:46) (135/65 - 181/72)  RR: 18 (07-20 @ 21:46) (18 - 18)    PHYSICAL EXAM:     The patient was awake, able to nod in response to questions but minimally verbal, appears chronically ill, comfortable and in no  apparent distress.  OP showed no ulcerations  There was no visible lymphadenopathy.  Conjunctiva were non injected  There was no clubbing or edema of extremities.  The scalp, face, eyebrows, lips, OP, neck, chest, back,   extremities X 4, nails were examined.  There was no hyperhidrosis or bromhidrosis.    Of note on skin exam:     confluent pink macules on the trunk and extremities  cream residue on lower abdomen  ____________________________________    LABS:                        9.3    11.95 )-----------( 211      ( 20 Jul 2023 09:59 )             31.4     07-20    140  |  103  |  63<H>  ----------------------------<  238<H>  4.5   |  26  |  2.92<H>    Ca    8.8      20 Jul 2023 10:00    TPro  6.9  /  Alb  2.4<L>  /  TBili  0.2  /  DBili  x   /  AST  18  /  ALT  18  /  AlkPhos  82  07-20      Urinalysis Basic - ( 20 Jul 2023 10:00 )    Color: x / Appearance: x / SG: x / pH: x  Gluc: 238 mg/dL / Ketone: x  / Bili: x / Urobili: x   Blood: x / Protein: x / Nitrite: x   Leuk Esterase: x / RBC: x / WBC x   Sq Epi: x / Non Sq Epi: x / Bacteria: x

## 2023-07-20 NOTE — PROGRESS NOTE ADULT - ASSESSMENT
74 y/o female with multiple medical problems whom i see in my practice on outptn basis. last home visit mairchuy 15   Ptn has h/o DM, HTN, breast CA, respiratory arrest and cardiac arrest (2018), CVA with residual weakness, h/o trache, PEG, both removed, now eating on her own but aspirates and has post prandial cough in addition to chronic productive cough. presumed 2/2 silent aspiration of her saliva this was d/w Dr. Jose Almendarez who is in agreement    Today ptn presents to the ED with  complaining of worsening cough and orthopnea. ptn has chronic LE edema.   Denies any headache, fever, chills, chest pain, abdominal pain, n/v/d, dysuria. Unable to obtain any history or ROS due to mental status.    A/P  7/12: Aspiration PNA, cannot R/O BRYAN. check sputum culture, acute on chronic diastolic chf, , chronic aspiration  ptn has multiple ABx allergies, start Cipro and flagyl which she tolerated in the past  cont outptn meds, get S&S eval  insulin on a sliding scale, RUSS, check bladder sono, renal sono  card, renal, pulm, endo ID called  cont outptn meds  7/13: ptn is awake, alert, daughter at bedside. daughter mentioned [facility], wants me to cont seeing her on outptn at home and her but also wants to cont w [facility] on out[facility]. i explained to her she needs to pick a pcp, she cannot have me and [facility] in the hospital. she wants to stay with me, if her father changes his mind, she will let me know.   Ptn looks better today, more communicative, smiling, eating rice pudding, coughing after each bite. awaitng S&S, will need MBS. didnt tolerate FEEST in the past. ptn is hungry. she used to have a trache and a PEG post CVA, was removed few years ago. HTN is not controlled coreg raised to 37.5 bid. ptn seen by Renal, Scr at baseline, started on Farxiga and will start Losartan 25 mg    7/14: MBS done: recs are small soft bite food w mildly thickened liquids. renal, card, ID, pulm, endo input appreciated. Ptn is in isolation, being ruled out for active TB. 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 now on CEFEPIME, sputum for TB ordered. ptn is lethargic, BP improved, daughter feels its 2/2 CLONIDINE. as per renal start LOSARTAN. i started LOSARTAN yesterday, but it was DCed. lethargy could be due to overdiuresis. remains on LASIX 40 mg q12H. family wants the ptn to go home . awaiting ID clearance prior to DC. daughter c/o LE weakness and inability to ambulate, neuro called    7/15: ptn is on ABx for PNA, being worked up for TB, seen by Neuro. no immediate interventions planned. cont present meds    7/16:  ptn is lethargic, has a rash, daughter is concerned its a reaction to the ABx, nonpruritic. daughter is refusing to start LOSARTAN. discussed its renal protective in ptns w DM. green sputum resolved, now its clear-yellow    7/17: ptn has an allergic rash, will start ATARAX for it. cleared by ID to DC isolation, DC ABx, dc planning tomorrow daughter wants home,  wants rehab, will decide in am    7/18: ptn is lethargic, has a diffuse rash, has pruritis, looks allergic in nature, will place on steroids, derm called, ABx DCed 7/17. completed course of Abx. AFB sputum neg, AFB Cx pending. green thick sputum resolved, now clear to yellow. cleared by speech therapy to eat a dysphagia diet. sleep wake cycle is off. stressed to family importance of a regular sleep wake cycle. PT eval    7/19: s/p treatment for PNA, developed a rash subsequently, prob an allergic response to ABx. started solumedrol yesterday 3/3 severity of the rash and edema, today much better, seen by derm, has hyperglycemia 2/2 steroids, will adjust insulin. ptn is more awake today, ate well today    7/20: rash improving, eating well, dc home in am, arrange home O2. prednisone taper                        dvt ppx w [facility]SC

## 2023-07-20 NOTE — PROGRESS NOTE ADULT - SUBJECTIVE AND OBJECTIVE BOX
Patient is a 73y old  Female who presents with a chief complaint of respiratory distress (20 Jul 2023 14:11)      SUBJECTIVE / OVERNIGHT EVENTS: rash improving, eating well, dc home in am, arrange home O2    MEDICATIONS  (STANDING):  albuterol/ipratropium for Nebulization 3 milliLiter(s) Nebulizer every 6 hours  aspirin enteric coated 81 milliGRAM(s) Oral daily  atorvastatin 10 milliGRAM(s) Oral at bedtime  buDESOnide    Inhalation Suspension 0.5 milliGRAM(s) Inhalation every 12 hours  carvedilol 37.5 milliGRAM(s) Oral every 12 hours  chlorhexidine 2% Cloths 1 Application(s) Topical <User Schedule>  cholecalciferol 2000 Unit(s) Oral daily  clobetasol 0.05% Ointment 1 Application(s) Topical two times a day  cloNIDine 0.1 milliGRAM(s) Oral two times a day  dextrose 5%. 1000 milliLiter(s) (50 mL/Hr) IV Continuous <Continuous>  dextrose 5%. 1000 milliLiter(s) (100 mL/Hr) IV Continuous <Continuous>  dextrose 50% Injectable 25 Gram(s) IV Push once  dextrose 50% Injectable 25 Gram(s) IV Push once  dextrose 50% Injectable 12.5 Gram(s) IV Push once  donepezil 10 milliGRAM(s) Oral at bedtime  doxazosin 8 milliGRAM(s) Oral at bedtime  ferrous    sulfate 325 milliGRAM(s) Oral daily  fluticasone propionate 50 MICROgram(s)/spray Nasal Spray 1 Spray(s) Both Nostrils two times a day  glucagon  Injectable 1 milliGRAM(s) IntraMuscular once  insulin glargine Injectable (LANTUS) 28 Unit(s) SubCutaneous at bedtime  insulin lispro (ADMELOG) corrective regimen sliding scale   SubCutaneous at bedtime  insulin lispro (ADMELOG) corrective regimen sliding scale   SubCutaneous three times a day before meals  insulin lispro Injectable (ADMELOG) 9 Unit(s) SubCutaneous three times a day before meals  letrozole 2.5 milliGRAM(s) Oral daily  methylPREDNISolone sodium succinate Injectable 40 milliGRAM(s) IV Push every 12 hours  Nephro-stacia 1 Tablet(s) Oral daily  polyethylene glycol 3350 17 Gram(s) Oral two times a day  sodium chloride 0.9% for Nebulization 3 milliLiter(s) Nebulizer every 6 hours  sodium chloride 0.9%. 1000 milliLiter(s) (100 mL/Hr) IV Continuous <Continuous>    MEDICATIONS  (PRN):  acetaminophen     Tablet .. 650 milliGRAM(s) Oral every 6 hours PRN Temp greater or equal to 38C (100.4F), Mild Pain (1 - 3), Moderate Pain (4 - 6)  dextrose Oral Gel 15 Gram(s) Oral once PRN Blood Glucose LESS THAN 70 milliGRAM(s)/deciliter  guaiFENesin Oral Liquid (Sugar-Free) 100 milliGRAM(s) Oral every 6 hours PRN Cough  hydrOXYzine hydrochloride 25 milliGRAM(s) Oral every 6 hours PRN Itching      Vital Signs Last 24 Hrs  T(F): 98.1 (07-20-23 @ 13:08), Max: 98.1 (07-20-23 @ 13:08)  HR: 67 (07-20-23 @ 17:37) (62 - 72)  BP: 168/75 (07-20-23 @ 17:37) (135/65 - 168/75)  RR: 18 (07-20-23 @ 13:08) (18 - 18)  SpO2: 99% (07-20-23 @ 13:08) (98% - 99%)  Telemetry:   CAPILLARY BLOOD GLUCOSE      POCT Blood Glucose.: 200 mg/dL (20 Jul 2023 16:49)  POCT Blood Glucose.: 270 mg/dL (20 Jul 2023 12:15)  POCT Blood Glucose.: 264 mg/dL (20 Jul 2023 08:22)  POCT Blood Glucose.: 254 mg/dL (19 Jul 2023 21:33)    I&O's Summary    19 Jul 2023 07:01  -  20 Jul 2023 07:00  --------------------------------------------------------  IN: 100 mL / OUT: 0 mL / NET: 100 mL        PHYSICAL EXAM:  GENERAL: NAD, well-developed  HEAD:  Atraumatic, Normocephalic  EYES: EOMI, PERRLA, conjunctiva and sclera clear  NECK: Supple, No JVD  CHEST/LUNG: Clear to auscultation bilaterally; No wheeze  HEART: Regular rate and rhythm; No murmurs, rubs, or gallops  ABDOMEN: Soft, Nontender, Nondistended; Bowel sounds present  EXTREMITIES:  2+ Peripheral Pulses, No clubbing, cyanosis, or edema  PSYCH: AAOx3  NEUROLOGY: non-focal  SKIN: No rashes or lesions    LABS:                        9.3    11.95 )-----------( 211      ( 20 Jul 2023 09:59 )             31.4     07-20    140  |  103  |  63<H>  ----------------------------<  238<H>  4.5   |  26  |  2.92<H>    Ca    8.8      20 Jul 2023 10:00    TPro  6.9  /  Alb  2.4<L>  /  TBili  0.2  /  DBili  x   /  AST  18  /  ALT  18  /  AlkPhos  82  07-20          Urinalysis Basic - ( 20 Jul 2023 10:00 )    Color: x / Appearance: x / SG: x / pH: x  Gluc: 238 mg/dL / Ketone: x  / Bili: x / Urobili: x   Blood: x / Protein: x / Nitrite: x   Leuk Esterase: x / RBC: x / WBC x   Sq Epi: x / Non Sq Epi: x / Bacteria: x        RADIOLOGY & ADDITIONAL TESTS:    Imaging Personally Reviewed:    Consultant(s) Notes Reviewed:      Care Discussed with Consultants/Other Providers:

## 2023-07-21 NOTE — PROGRESS NOTE ADULT - SUBJECTIVE AND OBJECTIVE BOX
Overnight events noted      VITAL:  T(C): , Max: 36.9 (07-20-23 @ 21:46)  T(F): , Max: 98.4 (07-20-23 @ 21:46)  HR: 68 (07-21-23 @ 07:01)  BP: 169/74 (07-21-23 @ 07:01)  BP(mean): --  RR: 18 (07-21-23 @ 06:56)  SpO2: 95% (07-21-23 @ 06:56)  Wt(kg): --      PHYSICAL EXAM:  Constitutional: lethargic, NAD  HEENT: NCAT, DMM  Neck: Supple, No JVD  Respiratory: CTA-b/l  Cardiovascular: RRR s1s2, no m/r/g  Gastrointestinal: BS+, soft, NT/ND  Extremities: No peripheral edema b/l  Neurological: no focal deficits; strength grossly intact  Back: no CVAT b/l  Skin: No rashes, no nevi      LABS:                        9.3    11.95 )-----------( 211      ( 20 Jul 2023 09:59 )             31.4     Na(140)/K(4.5)/Cl(103)/HCO3(26)/BUN(63)/Cr(2.92)Glu(238)/Ca(8.8)/Mg(--)/PO4(--)    07-20 @ 10:00  Na(141)/K(3.9)/Cl(102)/HCO3(27)/BUN(44)/Cr(2.74)Glu(132)/Ca(8.6)/Mg(--)/PO4(--)    07-18 @ 11:51      IMPRESSION: 73F w/ HTN, DM2, CVA, breast CA-b/l mastectomy, and CAC-trach/reversal, 7/12/23 p/w cough/sob    (1)CKD - stage 4 with nephrotic-range proteinuria - highly likely from diabetic nephropathy.    (2)RUSS - prerenal - worsening over past few days. Largely driven by high insensible losses from rash    (3)CV - stage 2 hypertension - acceptably controlled for now, on Coreg, Clonidine, and Cardura.     (4)Pulm - aspiration pneumonia/pneumonitis; s/p abx course (Cefepime==>Zosyn)    (5)Rash - likely abx-induced; receiving hydrocortisone cream and prn oral Hydroxyzine       RECOMMEND:  (1)NS 250cc/h x 4h  (2)Antihypertensives as ordered  (2)No objection to discharge after completion of IVF if no further complications ensue; repeat BMP next week; f/u with outside nephrologist Dr. Neely in 1-2 weeks                Jimmy Colon MD  [facility]  Office/on call physician: [phone]  Cell (7a-7m): [phone]       Family at bedside  No pain, no sob      VITAL:  T(C): , Max: 36.9 (07-20-23 @ 21:46)  T(F): , Max: 98.4 (07-20-23 @ 21:46)  HR: 68 (07-21-23 @ 07:01)  BP: 169/74 (07-21-23 @ 07:01)  BP(mean): --  RR: 18 (07-21-23 @ 06:56)  SpO2: 95% (07-21-23 @ 06:56)  Wt(kg): --      PHYSICAL EXAM:  Constitutional: alert, NAD  HEENT: NCAT, DMM  Neck: Supple, No JVD  Respiratory: CTA-b/l  Cardiovascular: RRR s1s2, no m/r/g  Gastrointestinal: BS+, soft, NT/ND  Extremities: No peripheral edema b/l  Neurological: no focal deficits; strength grossly intact  Back: no CVAT b/l        LABS:                        9.3    11.95 )-----------( 211      ( 20 Jul 2023 09:59 )             31.4     Na(140)/K(4.5)/Cl(103)/HCO3(26)/BUN(63)/Cr(2.92)Glu(238)/Ca(8.8)/Mg(--)/PO4(--)    07-20 @ 10:00  Na(141)/K(3.9)/Cl(102)/HCO3(27)/BUN(44)/Cr(2.74)Glu(132)/Ca(8.6)/Mg(--)/PO4(--)    07-18 @ 11:51      IMPRESSION: 73F w/ HTN, DM2, CVA, breast CA-b/l mastectomy, and CAC-trach/reversal, 7/12/23 p/w cough/sob    (1)CKD - stage 4 with nephrotic-range proteinuria - highly likely from diabetic nephropathy.    (2)RSUS - prerenal - worsening over past few days. Multifactorial from limited intake and insensible losses from rash    (3)CV - stage 2 hypertension - acceptably controlled for now, on Coreg, Clonidine, and Cardura.     (4)Pulm - aspiration pneumonia/pneumonitis; s/p abx course (Cefepime==>Zosyn)    (5)Rash - likely abx-induced; improving; Derm on board      RECOMMEND:  (1)NS 250cc/h x 4h  (2)Antihypertensives as ordered  (2)No objection to discharge after completion of IVF if no further complications ensue; repeat BMP next week; f/u with outside nephrologist Dr. Neely in 1-2 weeks                Jimmy Colon MD  [facility]  Office/on call physician: [phone]  Cell (7a-7p): [phone]

## 2023-07-21 NOTE — PROGRESS NOTE ADULT - SUBJECTIVE AND OBJECTIVE BOX
Patient is a 73y old  Female who presents with a chief complaint of respiratory distress (2023 14:39)      SUBJECTIVE / OVERNIGHT EVENTS: ptn qualifies for home O2, will get 4 hrs of IVF. dc planning once home O2 gets delivered    MEDICATIONS  (STANDING):  albuterol/ipratropium for Nebulization 3 milliLiter(s) Nebulizer every 6 hours  aspirin enteric coated 81 milliGRAM(s) Oral daily  atorvastatin 10 milliGRAM(s) Oral at bedtime  buDESOnide    Inhalation Suspension 0.5 milliGRAM(s) Inhalation every 12 hours  carvedilol 37.5 milliGRAM(s) Oral every 12 hours  chlorhexidine 2% Cloths 1 Application(s) Topical <User Schedule>  cholecalciferol 2000 Unit(s) Oral daily  clobetasol 0.05% Ointment 1 Application(s) Topical two times a day  cloNIDine 0.1 milliGRAM(s) Oral two times a day  dextrose 5%. 1000 milliLiter(s) (100 mL/Hr) IV Continuous <Continuous>  dextrose 5%. 1000 milliLiter(s) (50 mL/Hr) IV Continuous <Continuous>  dextrose 50% Injectable 25 Gram(s) IV Push once  dextrose 50% Injectable 25 Gram(s) IV Push once  dextrose 50% Injectable 12.5 Gram(s) IV Push once  donepezil 10 milliGRAM(s) Oral at bedtime  doxazosin 8 milliGRAM(s) Oral at bedtime  ferrous    sulfate 325 milliGRAM(s) Oral daily  fluticasone propionate 50 MICROgram(s)/spray Nasal Spray 1 Spray(s) Both Nostrils two times a day  glucagon  Injectable 1 milliGRAM(s) IntraMuscular once  insulin glargine Injectable (LANTUS) 30 Unit(s) SubCutaneous at bedtime  insulin lispro (ADMELOG) corrective regimen sliding scale   SubCutaneous at bedtime  insulin lispro (ADMELOG) corrective regimen sliding scale   SubCutaneous three times a day before meals  insulin lispro Injectable (ADMELOG) 10 Unit(s) SubCutaneous three times a day before meals  letrozole 2.5 milliGRAM(s) Oral daily  Nephro-stacia 1 Tablet(s) Oral daily  polyethylene glycol 3350 17 Gram(s) Oral two times a day  predniSONE   Tablet   Oral   sodium chloride 0.9% for Nebulization 3 milliLiter(s) Nebulizer every 6 hours  sodium chloride 0.9%. 1000 milliLiter(s) (250 mL/Hr) IV Continuous <Continuous>  sodium chloride 0.9%. 1000 milliLiter(s) (100 mL/Hr) IV Continuous <Continuous>    MEDICATIONS  (PRN):  acetaminophen     Tablet .. 650 milliGRAM(s) Oral every 6 hours PRN Temp greater or equal to 38C (100.4F), Mild Pain (1 - 3), Moderate Pain (4 - 6)  dextrose Oral Gel 15 Gram(s) Oral once PRN Blood Glucose LESS THAN 70 milliGRAM(s)/deciliter  guaiFENesin Oral Liquid (Sugar-Free) 100 milliGRAM(s) Oral every 6 hours PRN Cough  hydrOXYzine hydrochloride 25 milliGRAM(s) Oral every 6 hours PRN Itching      Vital Signs Last 24 Hrs  T(F): 97.7 (23 @ 11:50), Max: 98.4 (23 @ 21:46)  HR: 67 (23 @ 11:50) (65 - 74)  BP: 179/77 (23 @ 11:50) (168/75 - 181/72)  RR: 19 (23 @ 11:56) (18 - 19)  SpO2: 85% (23 @ 11:56) (85% - 97%)  Telemetry:   CAPILLARY BLOOD GLUCOSE      POCT Blood Glucose.: 279 mg/dL (2023 13:11)  POCT Blood Glucose.: 236 mg/dL (2023 11:46)  POCT Blood Glucose.: 265 mg/dL (2023 08:35)  POCT Blood Glucose.: 185 mg/dL (2023 21:39)  POCT Blood Glucose.: 200 mg/dL (2023 16:49)    I&O's Summary    2023 07:01  -  2023 07:00  --------------------------------------------------------  IN: 150 mL / OUT: 200 mL / NET: -50 mL        PHYSICAL EXAM:  GENERAL: NAD, well-developed  HEAD:  Atraumatic, Normocephalic  EYES: EOMI, PERRLA, conjunctiva and sclera clear  NECK: Supple, No JVD  CHEST/LUNG: Clear to auscultation bilaterally; No wheeze  HEART: Regular rate and rhythm; No murmurs, rubs, or gallops  ABDOMEN: Soft, Nontender, Nondistended; Bowel sounds present  EXTREMITIES:  2+ Peripheral Pulses, No clubbing, cyanosis, or edema  PSYCH: AAOx3  NEUROLOGY: non-focal  SKIN: No rashes or lesions    LABS:                        9.0    9.05  )-----------( 187      ( 2023 13:23 )             30.4     07-    135  |  100  |  75<H>  ----------------------------<  295<H>  4.4   |  23  |  2.87<H>    Ca    8.5      2023 13:23    TPro  7.0  /  Alb  2.6<L>  /  TBili  0.3  /  DBili  x   /  AST  27  /  ALT  36  /  AlkPhos  84  07-          Urinalysis Basic - ( 2023 13:34 )    Color: Yellow / Appearance: Slightly Turbid / S.019 / pH: x  Gluc: x / Ketone: Negative  / Bili: Negative / Urobili: Negative   Blood: x / Protein: 300 mg/dL / Nitrite: Negative   Leuk Esterase: Small / RBC: 24 /hpf / WBC 28 /HPF   Sq Epi: x / Non Sq Epi: x / Bacteria: Negative        RADIOLOGY & ADDITIONAL TESTS:    Imaging Personally Reviewed:    Consultant(s) Notes Reviewed:      Care Discussed with Consultants/Other Providers:

## 2023-07-21 NOTE — DISCHARGE NOTE PROVIDER - CARE PROVIDERS DIRECT ADDRESSES
,DirectAddress_Unknown,DirectAddress_Unknown,[email or web address] ,DirectAddress_Unknown,DirectAddress_Unknown,[email or web address],DirectAddress_Unknown,DirectAddress_Unknown,[email or web address]

## 2023-07-21 NOTE — DISCHARGE NOTE NURSING/CASE MANAGEMENT/SOCIAL WORK - PATIENT PORTAL LINK FT
You can access the FollowMyHealth Patient Portal offered by [facility] by registering at the following website: [email or web address]. By joining [email or web address] [email or web address] portal, you will also be able to view your health information using other applications (apps) compatible with our system.

## 2023-07-21 NOTE — CHART NOTE - NSCHARTNOTEFT_GEN_A_CORE
Medicine NP ([id number])    Based on pt. diagnosis CVA with residual weakness, she  requires lucrecia lift to transfer from bed to chair and chair   to bed.

## 2023-07-21 NOTE — PROGRESS NOTE ADULT - SUBJECTIVE AND OBJECTIVE BOX
Date of Service: 23 @ 14:39    Patient is a 73y old  Female who presents with a chief complaint of respiratory distress (2023 13:34)      Any change in ROS: She looks pretty good:   no sob:  no cough :       MEDICATIONS  (STANDING):  albuterol/ipratropium for Nebulization 3 milliLiter(s) Nebulizer every 6 hours  aspirin enteric coated 81 milliGRAM(s) Oral daily  atorvastatin 10 milliGRAM(s) Oral at bedtime  buDESOnide    Inhalation Suspension 0.5 milliGRAM(s) Inhalation every 12 hours  carvedilol 37.5 milliGRAM(s) Oral every 12 hours  chlorhexidine 2% Cloths 1 Application(s) Topical <User Schedule>  cholecalciferol 2000 Unit(s) Oral daily  clobetasol 0.05% Ointment 1 Application(s) Topical two times a day  cloNIDine 0.1 milliGRAM(s) Oral two times a day  dextrose 5%. 1000 milliLiter(s) (50 mL/Hr) IV Continuous <Continuous>  dextrose 5%. 1000 milliLiter(s) (100 mL/Hr) IV Continuous <Continuous>  dextrose 50% Injectable 25 Gram(s) IV Push once  dextrose 50% Injectable 25 Gram(s) IV Push once  dextrose 50% Injectable 12.5 Gram(s) IV Push once  donepezil 10 milliGRAM(s) Oral at bedtime  doxazosin 8 milliGRAM(s) Oral at bedtime  ferrous    sulfate 325 milliGRAM(s) Oral daily  fluticasone propionate 50 MICROgram(s)/spray Nasal Spray 1 Spray(s) Both Nostrils two times a day  glucagon  Injectable 1 milliGRAM(s) IntraMuscular once  insulin glargine Injectable (LANTUS) 30 Unit(s) SubCutaneous at bedtime  insulin lispro (ADMELOG) corrective regimen sliding scale   SubCutaneous at bedtime  insulin lispro (ADMELOG) corrective regimen sliding scale   SubCutaneous three times a day before meals  insulin lispro Injectable (ADMELOG) 10 Unit(s) SubCutaneous three times a day before meals  letrozole 2.5 milliGRAM(s) Oral daily  Nephro-stacia 1 Tablet(s) Oral daily  polyethylene glycol 3350 17 Gram(s) Oral two times a day  predniSONE   Tablet   Oral   sodium chloride 0.9% for Nebulization 3 milliLiter(s) Nebulizer every 6 hours  sodium chloride 0.9%. 1000 milliLiter(s) (100 mL/Hr) IV Continuous <Continuous>  sodium chloride 0.9%. 1000 milliLiter(s) (250 mL/Hr) IV Continuous <Continuous>    MEDICATIONS  (PRN):  acetaminophen     Tablet .. 650 milliGRAM(s) Oral every 6 hours PRN Temp greater or equal to 38C (100.4F), Mild Pain (1 - 3), Moderate Pain (4 - 6)  dextrose Oral Gel 15 Gram(s) Oral once PRN Blood Glucose LESS THAN 70 milliGRAM(s)/deciliter  guaiFENesin Oral Liquid (Sugar-Free) 100 milliGRAM(s) Oral every 6 hours PRN Cough  hydrOXYzine hydrochloride 25 milliGRAM(s) Oral every 6 hours PRN Itching    Vital Signs Last 24 Hrs  T(C): 36.5 (2023 11:50), Max: 36.9 (2023 21:46)  T(F): 97.7 (2023 11:50), Max: 98.4 (2023 21:46)  HR: 67 (2023 11:50) (65 - 74)  BP: 179/77 (2023 11:50) (168/75 - 181/72)  BP(mean): --  RR: 19 (2023 11:56) (18 - 19)  SpO2: 85% (2023 11:56) (85% - 97%)    Parameters below as of 2023 11:56  Patient On (Oxygen Delivery Method): room air        I&O's Summary    2023 07:01  -  2023 07:00  --------------------------------------------------------  IN: 150 mL / OUT: 200 mL / NET: -50 mL          Physical Exam:   GENERAL: NAD, well-groomed, well-developed  HEENT: MANDY/   Atraumatic, Normocephalic  ENMT: No tonsillar erythema, exudates, or enlargement; Moist mucous membranes, Good dentition, No lesions  NECK: Supple, No JVD, Normal thyroid  CHEST/LUNG: Clear to auscultaion  CVS: Regular rate and rhythm; No murmurs, rubs, or gallops  GI: : Soft, Nontender, Nondistended; Bowel sounds present  NERVOUS SYSTEM:  Alert & Oriented X3  EXTREMITIES:  minimal  edema  LYMPH: No lymphadenopathy noted  SKIN: No rashes or lesions  ENDOCRINOLOGY: No Thyromegaly  PSYCH: Appropriate    Labs:                              9.0    9.05  )-----------( 187      ( 2023 13:23 )             30.4                         9.3    11.95 )-----------( 211      ( 2023 09:59 )             31.4                         9.6    12.95 )-----------( 198      ( 2023 11:51 )             32.5     07-21    135  |  100  |  75<H>  ----------------------------<  295<H>  4.4   |  23  |  2.87<H>      140  |  103  |  63<H>  ----------------------------<  238<H>  4.5   |  26  |  2.92<H>  0718    141  |  102  |  44<H>  ----------------------------<  132<H>  3.9   |  27  |  2.74<H>    Ca    8.5      2023 13:23  Ca    8.8      2023 10:00    TPro  7.0  /  Alb  2.6<L>  /  TBili  0.3  /  DBili  x   /  AST  27  /  ALT  36  /  AlkPhos  84  0721  TPro  6.9  /  Alb  2.4<L>  /  TBili  0.2  /  DBili  x   /  AST  18  /  ALT  18  /  AlkPhos  82  0720    CAPILLARY BLOOD GLUCOSE      POCT Blood Glucose.: 279 mg/dL (2023 13:11)  POCT Blood Glucose.: 236 mg/dL (2023 11:46)  POCT Blood Glucose.: 265 mg/dL (2023 08:35)  POCT Blood Glucose.: 185 mg/dL (2023 21:39)  POCT Blood Glucose.: 200 mg/dL (2023 16:49)      LIVER FUNCTIONS - ( 2023 13:23 )  Alb: 2.6 g/dL / Pro: 7.0 g/dL / ALK PHOS: 84 U/L / ALT: 36 U/L / AST: 27 U/L / GGT: x             Urinalysis Basic - ( 2023 13:34 )    Color: Yellow / Appearance: Slightly Turbid / S.019 / pH: x  Gluc: x / Ketone: Negative  / Bili: Negative / Urobili: Negative   Blood: x / Protein: 300 mg/dL / Nitrite: Negative   Leuk Esterase: Small / RBC: x / WBC x   Sq Epi: x / Non Sq Epi: x / Bacteria: x            RECENT CULTURES:   @ 12:01 .Sputum Sputum       < from: CT Head No Cont (23 @ 22:44) >  fluid collection.    There is diffuse cerebral volume loss with prominence of the sulci,   fissures, and cisternal spaces which is normal for the patient's age.   There is mild periventricular white matter hypoattenuation statistically   compatible with microvascular type changes given calcific atherosclerotic   disease of the intracranial arteries.    The paranasal sinuses and right mastoid cavity is clear. There is under   pneumatization and sclerosis of the left hip in the mastoid cavity. Soft   tissue is seen within the left middle ear.    There is evidenceof bilateral cataract removal.    Multiple rounded lucencies in the skull are nonspecific.    IMPRESSION: No acute intracranial findings.    Chronic left occipital lobe infarct an similar-appearing mild chronic   white matter microvascular type changes.    --- End of Report ---          < end of copied text >           Culture is being performed.    07-15 @ 09:06 .Blood Blood-Catheter                No growth at 5 days    07-15 @ 09:03 .Blood Blood-Catheter                No growth at 5 days    07-15 @ 06:38 .Sputum Sputum                Culture is being performed.     @ 21:21 .Sputum Sputum                Culture is being performed.          RESPIRATORY CULTURES:          Studies  Chest X-RAY  CT SCAN Chest   Venous Dopplers: LE:   CT Abdomen  Others

## 2023-07-21 NOTE — DISCHARGE NOTE PROVIDER - NPI NUMBER (FOR [employer] USE ONLY) :
[[id number]] [[id number]],[[id number]],[[id number]]

## 2023-07-21 NOTE — DISCHARGE NOTE PROVIDER - DISCHARGE DIET
Consistent Carbohydrate Diabetic Diets/Moderately Thick Liquids Soft and Bite-Sized Diet/Consistent Carbohydrate Diabetic Diets/Moderately Thick Liquids

## 2023-07-21 NOTE — DISCHARGE NOTE PROVIDER - HOSPITAL COURSE
72 y/o female with multiple medical problems whom i see in my practice on outptn basis. last home visit marichuy 15   Ptn has h/o DM, HTN, breast CA, respiratory arrest and cardiac arrest (2018), CVA with residual weakness, h/o trache, PEG, both removed, now eating on her own but aspirates and has post prandial cough in addition to chronic productive cough. presumed 2/2 silent aspiration of her saliva this was d/w Dr. Jose Almendarez who is in agreement    Today ptn presents to the ED with  complaining of worsening cough and orthopnea. ptn has chronic LE edema.   Denies any headache, fever, chills, chest pain, abdominal pain, n/v/d, dysuria. Unable to obtain any history or ROS due to mental status.    A/P  7/12: Aspiration PNA, cannot R/O BRYAN. check sputum culture, acute on chronic diastolic chf, , chronic aspiration  ptn has multiple ABx allergies, start Cipro and flagyl which she tolerated in the past  cont outptn meds, get S&S eval  insulin on a sliding scale, RUSS, check bladder sono, renal sono  card, renal, pulm, endo ID called  cont outptn meds  7/13: ptn is awake, alert, daughter at bedside. daughter mentioned [facility], wants me to cont seeing her on outptn at home and her but also wants to [facility]. i explained to her she needs to pick a pcp, she cannot have [facility] and [facility] in [facility] hospital. she wants to stay with me, if her father changes his mind, she will let me know.   Ptn looks better today, more communicative, smiling, eating rice pudding, coughing after each bite. awaitng S&S, will need MBS. didnt tolerate FEEST in the past. ptn is hungry. she used to have a trache and a PEG post CVA, was removed few years ago. HTN is not controlled coreg raised to 37.5 bid. ptn seen by Renal, Scr at baseline, started on Farxiga and will start Losartan 25 mg    7/14: MBS done: recs are small soft bite food w mildly thickened liquids. renal, card, ID, pulm, endo input appreciated. Ptn is in isolation, being ruled out for active TB. 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 now on CEFEPIME, sputum for TB ordered. ptn is lethargic, BP improved, daughter feels its 2/2 CLONIDINE. as per renal start LOSARTAN. i started LOSARTAN yesterday, but it was DCed. lethargy could be due to overdiuresis. remains on LASIX 40 mg q12H. family wants the ptn to go home . awaiting ID clearance prior to DC. daughter c/o LE weakness and inability to ambulate, neuro called    7/15: ptn is on ABx for PNA, being worked up for TB, seen by Neuro. no immediate interventions planned. cont present meds    7/16:  ptn is lethargic, has a rash, daughter is concerned its a reaction to the ABx, nonpruritic. daughter is refusing to start LOSARTAN. discussed its renal protective in ptns w DM. green sputum resolved, now its clear-yellow    7/17: ptn has an allergic rash, will start ATARAX for it. cleared by ID to DC isolation, DC ABx, dc planning tomorrow daughter wants home,  wants rehab, will decide in am    7/18: ptn is lethargic, has a diffuse rash, has pruritis, looks allergic in nature, will place on steroids, derm called, ABx DCed 7/17. completed course of Abx. AFB sputum neg, AFB Cx pending. green thick sputum resolved, now clear to yellow. cleared by speech therapy to eat a dysphagia diet. sleep wake cycle is off. stressed to family importance of a regular sleep wake cycle. PT eval    7/19: s/p treatment for PNA, developed a rash subsequently, prob an allergic response to ABx. started solumedrol yesterday 3/3 severity of the rash and edema, today much better, seen by derm, has hyperglycemia 2/2 steroids, will adjust insulin. ptn is more awake today, ate well today    7/20: rash improving, eating well, dc home in am, arrange home O2. prednisone taper    7/21: ptn qualifies for home O2, will get 4 hrs of IVF. dc planning once home O2 gets delivered. rash is resolving  dvt ppx w [facility]     72 y/o female with multiple medical problems whom i see in my practice on outptn basis. last home visit marichuy 15   Ptn has h/o DM, HTN, breast CA, respiratory arrest and cardiac arrest (2018), CVA with residual weakness, h/o trache, PEG, both removed, now eating on her own but aspirates and has post prandial cough in addition to chronic productive cough. presumed 2/2 silent aspiration of her saliva this was d/w Dr. Jose Almendarez who is in agreement    Today ptn presents to the ED with  complaining of worsening cough and orthopnea. ptn has chronic LE edema.   Denies any headache, fever, chills, chest pain, abdominal pain, n/v/d, dysuria. Unable to obtain any history or ROS due to mental status.    A/P  7/12: Aspiration PNA, cannot R/O BRYAN. check sputum culture, acute on chronic diastolic chf, , chronic aspiration  ptn has multiple ABx allergies, start Cipro and flagyl which she tolerated in the past  cont outptn meds, get S&S eval  insulin on a sliding scale, RUSS, check bladder sono, renal sono  card, renal, pulm, endo ID called  cont outptn meds  7/13: ptn is awake, alert, daughter at bedside. [facility] wants me to cont seeing her on outptn at home and her but also wants [facility] on [facility]. i explained to her she needs to pick a pcp, she cannot have [facility] and [facility] in [facility] hospital. she wants to stay with me, if her father changes his mind, she will let me know.   Ptn looks better today, more communicative, smiling, eating rice pudding, coughing after each bite. awaitng S&S, will need MBS. didnt tolerate FEEST in the past. ptn is hungry. she used to have a trache and a PEG post CVA, was removed few years ago. HTN is not controlled coreg raised to 37.5 bid. ptn seen by Renal, Scr at baseline, started on Farxiga and will start Losartan 25 mg    7/14: MBS done: recs are small soft bite food w mildly thickened liquids. renal, card, ID, pulm, endo input appreciated. Ptn is in isolation, being ruled out for active TB. 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 now on CEFEPIME, sputum for TB ordered. ptn is lethargic, BP improved, daughter feels its 2/2 CLONIDINE. as per renal start LOSARTAN. i started LOSARTAN yesterday, but it was DCed. lethargy could be due to overdiuresis. remains on LASIX 40 mg q12H. family wants the ptn to go home . awaiting ID clearance prior to DC. daughter c/o LE weakness and inability to ambulate, neuro called    7/15: ptn is on ABx for PNA, being worked up for TB, seen by Neuro. no immediate interventions planned. cont present meds    7/16:  ptn is lethargic, has a rash, daughter is concerned its a reaction to the ABx, nonpruritic. daughter is refusing to start LOSARTAN. discussed its renal protective in ptns w DM. green sputum resolved, now its clear-yellow    7/17: ptn has an allergic rash, will start ATARAX for it. cleared by ID to DC isolation, DC ABx, dc planning tomorrow daughter wants home,  wants rehab, will decide in am    7/18: ptn is lethargic, has a diffuse rash, has pruritis, looks allergic in nature, will place on steroids, derm called, ABx DCed 7/17. completed course of Abx. AFB sputum neg, AFB Cx pending. green thick sputum resolved, now clear to yellow. cleared by speech therapy to eat a dysphagia diet. sleep wake cycle is off. stressed to family importance of a regular sleep wake cycle. PT eval    7/19: s/p treatment for PNA, developed a rash subsequently, prob an allergic response to ABx. started solumedrol yesterday 3/3 severity of the rash and edema, today much better, seen by derm, has hyperglycemia 2/2 steroids, will adjust insulin. ptn is more awake today, ate well today    7/20: rash improving, eating well, dc home in am, arrange home O2. prednisone taper    7/21: ptn qualifies for home O2, will get 4 hrs of IVF. dc planning once home O2 gets delivered. rash is resolving    7/22: home O2 delivered, BP elevated. cards recs appreciated, clonidine increased. medically cleared by discharge by attending    Discharge/dispo/med rec discussed with Dr. Brady who determined patient stable and medically cleared for discharge home with home care services/home PT and close outpatient follow up for continued care.      72 y/o female with multiple medical problems whom i see in my practice on outptn basis. last home visit marichuy 15   Ptn has h/o DM, HTN, breast CA, respiratory arrest and cardiac arrest (2018), CVA with residual weakness, h/o trache, PEG, both removed, now eating on her own but aspirates and has post prandial cough in addition to chronic productive cough. presumed 2/2 silent aspiration of her saliva this was d/w Dr. Jose Almendarez who is in agreement    Today ptn presents to the ED with  complaining of worsening cough and orthopnea. ptn has chronic LE edema.   Denies any headache, fever, chills, chest pain, abdominal pain, n/v/d, dysuria. Unable to obtain any history or ROS due to mental status.    A/P  7/12: Aspiration PNA, cannot R/O BRYAN. check sputum culture, acute on chronic diastolic chf, , chronic aspiration  ptn has multiple ABx allergies, start Cipro and flagyl which she tolerated in the past  cont outptn meds, get S&S eval  insulin on a sliding scale, RUSS, bladder sono, renal sono noted  card, renal, pulm, endo ID called  cont outptn meds  7/13: ptn is awake, alert, daughter at bedside. [facility] wants me to cont seeing her on outptn at home and her but also wants [facility]. i explained to her she needs to pick a pcp, she cannot have [facility] and [facility]. she wants to stay with me, if her father changes his mind, she will let me know.   Ptn looks better today, more communicative, smiling, eating rice pudding, coughing after each bite. awaitng S&S, will need MBS. didnt tolerate FEEST in the past. ptn is hungry. she used to have a trache and a PEG post CVA, was removed few years ago. HTN is not controlled coreg raised to 37.5 bid. ptn seen by Renal, Scr at baseline, started on Farxiga and will start Losartan 25 mg    7/14: MBS done: recs are small soft bite food w mildly thickened liquids. renal, card, ID, pulm, endo input appreciated. Ptn is in isolation, being ruled out for active TB. 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 now on CEFEPIME, sputum for TB ordered. ptn is lethargic, BP improved, daughter feels its 2/2 CLONIDINE. as per renal start LOSARTAN. i started LOSARTAN yesterday, but it was DCed. lethargy could be due to overdiuresis. remains on LASIX 40 mg q12H. family wants the ptn to go home . awaiting ID clearance prior to DC. daughter c/o LE weakness and inability to ambulate, neuro called    7/15: ptn is on ABx for PNA, being worked up for TB, seen by Neuro. no immediate interventions planned. cont present meds    7/16:  ptn is lethargic, has a rash, daughter is concerned its a reaction to the ABx, nonpruritic. daughter is refusing to start LOSARTAN. discussed its renal protective in ptns w DM. green sputum resolved, now its clear-yellow    7/17: ptn has an allergic rash, will start ATARAX for it. cleared by ID to DC isolation, DC ABx, dc planning tomorrow daughter wants home,  wants rehab, will decide in am    7/18: ptn is lethargic, has a diffuse rash, has pruritis, looks allergic in nature, will place on steroids, derm called, ABx DCed 7/17. completed course of Abx. AFB sputum neg, AFB Cx pending. green thick sputum resolved, now clear to yellow. cleared by speech therapy to eat a dysphagia diet. sleep wake cycle is off. stressed to family importance of a regular sleep wake cycle. PT eval    7/19: s/p treatment for PNA, developed a rash subsequently, prob an allergic response to ABx. started solumedrol yesterday 3/3 severity of the rash and edema, today much better, seen by derm, has hyperglycemia 2/2 steroids, will adjust insulin. ptn is more awake today, ate well today    7/20: rash improving, eating well, dc home in am, arrange home O2. prednisone taper    7/21: ptn qualifies for home O2, will get 4 hrs of IVF. dc planning once home O2 gets delivered. rash is resolving    7/22: home O2 delivered, BP elevated. cards recs appreciated, clonidine increased. medically cleared by discharge by attending    Discharge/dispo/med rec discussed with Dr. Brady who determined patient stable and medically cleared for discharge home with home care services/home PT and close outpatient follow up for continued care.      72 y/o female with multiple medical problems whom i see in my practice on outptn basis. last home visit marichuy 15   Ptn has h/o DM, HTN, breast CA, respiratory arrest and cardiac arrest (2018), CVA with residual weakness, h/o trache, PEG, both removed, now eating on her own but aspirates and has post prandial cough in addition to chronic productive cough. presumed 2/2 silent aspiration of her saliva this was d/w Dr. Jose Almendarez who is in agreement    Today ptn presents to the ED with  complaining of worsening cough and orthopnea. ptn has chronic LE edema.   Denies any headache, fever, chills, chest pain, abdominal pain, n/v/d, dysuria. Unable to obtain any history or ROS due to mental status.    A/P  7/12: Aspiration PNA, cannot R/O BRYAN. check sputum culture, acute on chronic diastolic chf, , chronic aspiration  ptn has multiple ABx allergies, start Cipro and flagyl which she tolerated in the past  cont outptn meds, get S&S eval  insulin on a sliding scale, RUSS, bladder sono, renal sono noted  card, renal, pulm, endo ID called  cont outptn meds  7/13: ptn is awake, alert, daughter at bedside. [facility] wants me to cont seeing her on outptn at home and her but also wants [facility] cont [facility] on [facility]. i explained to her she needs to pick a pcp, she cannot have [facility] and [facility]. she wants to stay with me, if her father changes his mind, she will let me know.   Ptn looks better today, more communicative, smiling, eating rice pudding, coughing after each bite. awaitng S&S, will need MBS. didnt tolerate FEEST in the past. ptn is hungry. she used to have a trache and a PEG post CVA, was removed few years ago. HTN is not controlled coreg raised to 37.5 bid. ptn seen by Renal, Scr at baseline, started on Farxiga and will start Losartan 25 mg    7/14: MBS done: recs are small soft bite food w mildly thickened liquids. renal, card, ID, pulm, endo input appreciated. Ptn is in isolation, being ruled out for active TB. 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 now on CEFEPIME, sputum for TB ordered. ptn is lethargic, BP improved, daughter feels its 2/2 CLONIDINE. as per renal start LOSARTAN. i started LOSARTAN yesterday, but it was DCed. lethargy could be due to overdiuresis. remains on LASIX 40 mg q12H. family wants the ptn to go home . awaiting ID clearance prior to DC. daughter c/o LE weakness and inability to ambulate, neuro called    7/15: ptn is on ABx for PNA, being worked up for TB, seen by Neuro. no immediate interventions planned. cont present meds    7/16:  ptn is lethargic, has a rash, daughter is concerned its a reaction to the ABx, nonpruritic. daughter is refusing to start LOSARTAN. discussed its renal protective in ptns w DM. green sputum resolved, now its clear-yellow    7/17: ptn has an allergic rash, will start ATARAX for it. cleared by ID to DC isolation, DC ABx, dc planning tomorrow daughter wants home,  wants rehab, will decide in am    7/18: ptn is lethargic, has a diffuse rash, has pruritis, looks allergic in nature, will place on steroids, derm called, ABx DCed 7/17. completed course of Abx. AFB sputum neg, AFB Cx pending. green thick sputum resolved, now clear to yellow. cleared by speech therapy to eat a dysphagia diet. sleep wake cycle is off. stressed to family importance of a regular sleep wake cycle. PT eval    7/19: s/p treatment for PNA, developed a rash subsequently, prob an allergic response to ABx. started solumedrol yesterday 3/3 severity of the rash and edema, today much better, seen by derm, has hyperglycemia 2/2 steroids, will adjust insulin. ptn is more awake today, ate well today    7/20: rash improving, eating well, dc home in am, arrange home O2. prednisone taper    7/21: ptn qualifies for home O2, will get 4 hrs of IVF. dc planning once home O2 gets delivered. rash is resolving    7/22: home O2 delivered, BP elevated. cards recs appreciated, clonidine increased. medically cleared for discharge by attending Dr. Brady. Per  at bedside, patient has all insulin equipment required at home, does not wish for additional to be sent alongside insulin regimen changes made by endocrinology during this admission.     Discharge/dispo/med rec discussed with Dr. Brady who determined patient stable and medically cleared for discharge home with home care services/home PT and close outpatient follow up for continued care.

## 2023-07-21 NOTE — DISCHARGE NOTE PROVIDER - NSDCMRMEDTOKEN_GEN_ALL_CORE_FT
albuterol nebulizer solution: inhale twice daily as needed; dose unknown  aspirin 81 mg oral delayed release tablet: 1 tab(s) orally once a day  atorvastatin 10 mg oral tablet: 1 tab(s) orally once a day  budesonide 0.5 mg/2 mL inhalation suspension: 2 milliliter(s) by nebulizer once a day  carvedilol 12.5 mg oral tablet: 3 tab(s) orally 2 times a day  cholecalciferol 50 mcg (2000 intl units) oral tablet: 1 tab(s) orally once a day  ciclopirox 8% topical solution: Apply topically to affected area once a day (at bedtime)  donepezil 10 mg oral tablet: 1 tab(s) orally once a day (at bedtime)  doxazosin: 6mg twice a day  formoterol 20 mcg/2 mL inhalation solution: 2 milliliter(s) inhaled once a day  furosemide 40 mg oral tablet: 1 tab(s) orally once a day  Wilian lift: use to get pt. oob  HumuLIN 70/30 subcutaneous suspension: 24 units in the morning and 14 units in the evening  ipratropium 42 mcg/inh (0.06%) nasal spray: 1 spray(s) intranasally 2 times a day  Januvia 25 mg oral tablet: 1 tab(s) orally once a day  letrozole 2.5 mg oral tablet: 1 tab(s) orally once a day  Multiple Vitamins oral tablet: 1 tab(s) orally once a day  PreserVision AREDS 2 oral capsule: 1 cap(s) orally once a day  Reclining Wheelchair: for mobility  sodium chloride nebulizer solution: inhale once daily; dose unknown   albuterol nebulizer solution: inhale twice daily as needed; dose unknown  aspirin 81 mg oral delayed release tablet: 1 tab(s) orally once a day  atorvastatin 10 mg oral tablet: 1 tab(s) orally once a day  budesonide 0.5 mg/2 mL inhalation suspension: 2 milliliter(s) by nebulizer once a day  carvedilol 12.5 mg oral tablet: 3 tab(s) orally 2 times a day  cholecalciferol 50 mcg (2000 intl units) oral tablet: 1 tab(s) orally once a day  ciclopirox 8% topical solution: Apply topically to affected area once a day (at bedtime)  commode: use for toileting as needed  donepezil 10 mg oral tablet: 1 tab(s) orally once a day (at bedtime)  doxazosin: 6mg twice a day  formoterol 20 mcg/2 mL inhalation solution: 2 milliliter(s) inhaled once a day  furosemide 40 mg oral tablet: 1 tab(s) orally once a day  Wilian lift: use to get pt. oob  HumuLIN 70/30 subcutaneous suspension: 24 units in the morning and 14 units in the evening  ipratropium 42 mcg/inh (0.06%) nasal spray: 1 spray(s) intranasally 2 times a day  Januvia 25 mg oral tablet: 1 tab(s) orally once a day  letrozole 2.5 mg oral tablet: 1 tab(s) orally once a day  Multiple Vitamins oral tablet: 1 tab(s) orally once a day  PreserVision AREDS 2 oral capsule: 1 cap(s) orally once a day  Reclining Wheelchair: for mobility  sodium chloride nebulizer solution: inhale once daily; dose unknown   aspirin 81 mg oral delayed release tablet: 1 tab(s) orally once a day  atorvastatin 10 mg oral tablet: 1 tab(s) orally once a day  budesonide 0.5 mg/2 mL inhalation suspension: 2 milliliter(s) by nebulizer once a day  carvedilol 12.5 mg oral tablet: 3 tab(s) orally 2 times a day  cholecalciferol 50 mcg (2000 intl units) oral tablet: 1 tab(s) orally once a day  clobetasol 0.05% topical ointment: Apply topically to affected area 2 times a day 1 Apply topically to affected area 2 times a day  cloNIDine 0.2 mg oral tablet: 1 tab(s) orally 2 times a day  commode: use for toileting as needed  donepezil 10 mg oral tablet: 1 tab(s) orally once a day (at bedtime)  doxazosin 8 mg oral tablet: 1 tab(s) orally once a day (at bedtime)  ferrous sulfate 325 mg (65 mg elemental iron) oral tablet: 1 tab(s) orally once a day  Wilian lift: use to get pt. oob  ipratropium 42 mcg/inh (0.06%) nasal spray: 1 spray(s) intranasally 2 times a day  ipratropium-albuterol 0.5 mg-2.5 mg/3 mL inhalation solution: 3 milliliter(s) inhaled every 6 hours  Januvia 25 mg oral tablet: 1 tab(s) orally once a day  Lantus Solostar Pen 100 units/mL subcutaneous solution: 34 unit(s) subcutaneous once a day (at bedtime)  letrozole 2.5 mg oral tablet: 1 tab(s) orally once a day  Nephro-Katheirne oral tablet: 1 tab(s) orally once a day  predniSONE 10 mg oral tablet: 1 tab(s) orally once a day Take 20 mg (2 pills) once on 7/23/23  Take 10 mg (1 pill) once on 7/24/23  PreserVision AREDS 2 oral capsule: 1 cap(s) orally once a day  Reclining Wheelchair: for mobility  sodium chloride 0.9% inhalation solution: 3 milliliter(s) inhaled every 6 hours as needed for  shortness of breath and/or wheezing

## 2023-07-21 NOTE — DISCHARGE NOTE NURSING/CASE MANAGEMENT/SOCIAL WORK - NSDCFUADDAPPT_GEN_ALL_CORE_FT
APPTS ARE READY TO BE MADE: [ X] YES    Best Family or Patient Contact (if needed):    Additional Information about above appointments (if needed):    1: Endocrine  2: Pulmonary  3: PCP  4: Nephrology  5: Neurology    Other comments or requests:

## 2023-07-21 NOTE — PROGRESS NOTE ADULT - ASSESSMENT
74 y/o female with multiple medical problems whom i see in my practice on outptn basis. last home visit marichuy 15   Ptn has h/o DM, HTN, breast CA, respiratory arrest and cardiac arrest (2018), CVA with residual weakness, h/o trache, PEG, both removed, now eating on her own but aspirates and has post prandial cough in addition to chronic productive cough. presumed 2/2 silent aspiration of her saliva      pt  presents to the ED with  complaining of worsening cough and orthopnea. ptn has chronic LE edema.       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 2 other specimens in lab - f/u results :  -she is lethargic: check ABG: : no sedatives:   -ABG with chr compensated hypercarbic resp failure:  no need for bipap:  not the reason fr ams: OR SLEEPINESS  -she is much m ore alert and awake today  :  -no new complaints:   -jewell me she looks much better to-day  : o n oxygen : will need home oxygen   RASH:   -new rash  :  -on steroids cream :   - cefepime changed to zosyn: id following  antibiotics to be finished tomorrow   -completed course now  acute on chronic CHF  -echo noted:   -defer to primary cards team   RUSS   -she likely has ac on chr CKD:  -she always had high creat before:   HTN  -her blood pressure is slightly had:   -cont to optimize anti hypertensives:   DM  monitor and control :    dvt prophylaxis : for dc planning :she could not be weaned off : needs home oxygen

## 2023-07-21 NOTE — DISCHARGE NOTE PROVIDER - NSDCCPCAREPLAN_GEN_ALL_CORE_FT
PRINCIPAL DISCHARGE DIAGNOSIS  Diagnosis: Acute CHF  Assessment and Plan of Treatment: Weigh yourself daily.  If you gain 3lbs in 3 days, or 5lbs in a week call your Health Care Provider.  Do not eat or drink foods containing more than 2000mg of salt (sodium) in your diet every day.  Call your Health Care Provider if you have any swelling or increased swelling in your feet, ankles, and/or stomach.  Take all of your medication as directed.  If you become dizzy call your Health Care Provider.        SECONDARY DISCHARGE DIAGNOSES  Diagnosis: Pneumonia, aspiration  Assessment and Plan of Treatment: Pneumonia is a lung infection that can cause a fever, cough, and trouble breathing.  Continue all antibiotics as ordered until complete.  Nutrition is important, eat small frequent meals.  Get lots of rest and drink fluids.  Call your health care provider upon arrival home from hospital and make a follow up appointment for one week.  If your cough worsens, you develop fever greater than 101', you have shaking chills, a fast heartbeat, trouble breathing and/or feel your are breathing much faster than usual, call your healthcare provider.  Make sure you wash your hands frequently.       PRINCIPAL DISCHARGE DIAGNOSIS  Diagnosis: Acute CHF  Assessment and Plan of Treatment: Weigh yourself daily.  If you gain 3lbs in 3 days, or 5lbs in a week call your Health Care Provider.  Do not eat or drink foods containing more than 2000mg of salt (sodium) in your diet every day.  Call your Health Care Provider if you have any swelling or increased swelling in your feet, ankles, and/or stomach.  Take all of your medication as directed.  If you become dizzy call your Health Care Provider.        SECONDARY DISCHARGE DIAGNOSES  Diagnosis: Pneumonia, aspiration  Assessment and Plan of Treatment: Pneumonia is a lung infection that can cause a fever, cough, and trouble breathing.  Continue all antibiotics as ordered until complete.  Nutrition is important, eat small frequent meals.  Get lots of rest and drink fluids.  Call your health care provider upon arrival home from hospital and make a follow up appointment for one week.  If your cough worsens, you develop fever greater than 101', you have shaking chills, a fast heartbeat, trouble breathing and/or feel your are breathing much faster than usual, call your healthcare provider.  Make sure you wash your hands frequently.      Diagnosis: Diabetes  Assessment and Plan of Treatment:      PRINCIPAL DISCHARGE DIAGNOSIS  Diagnosis: Acute CHF  Assessment and Plan of Treatment: Please promptly follow up with cardiology for continued care.   Weigh yourself daily.  If you gain 3lbs in 3 days, or 5lbs in a week call your Health Care Provider.  Do not eat or drink foods containing more than 2000mg of salt (sodium) in your diet every day.  Call your Health Care Provider if you have any swelling or increased swelling in your feet, ankles, and/or stomach.  Take all of your medication as directed.  If you become dizzy call your Health Care Provider.        SECONDARY DISCHARGE DIAGNOSES  Diagnosis: Pneumonia, aspiration  Assessment and Plan of Treatment: Please take medications as prescribed. Please promptly follow up with pulmonary for continued care.   Pneumonia is a lung infection that can cause a fever, cough, and trouble breathing.  You have finished course of antibiotics.   Nutrition is important, eat small frequent meals.  Get lots of rest and drink fluids.  Call your health care provider upon arrival home from hospital and make a follow up appointment for one week.  If your cough worsens, you develop fever greater than 101', you have shaking chills, a fast heartbeat, trouble breathing and/or feel your are breathing much faster than usual, call your healthcare provider.  Make sure you wash your hands frequently.      Diagnosis: Diabetes  Assessment and Plan of Treatment: Please promptly follow up with you outpatient endocrinologist for further outpt insulin adjustment jair when steroids being tapered and for continued care.  Make sure you get your HgA1c checked every three months.  If you take oral diabetes medications, check your blood glucose two times a day.  If you take insulin, check your blood glucose before meals and at bedtime.  It's important not to skip any meals.  Keep a log of your blood glucose results and always take it with you to your doctor appointments.  Keep a list of your current medications including injectables and over the counter medications and bring this medication list with you to all your doctor appointments.  If you have not seen your ophthalmologist this year call for appointment.  Check your feet daily for redness, sores, or openings. Do not self treat. If no improvement in two days call your primary care physician for an appointment.  Low blood sugar (hypoglycemia) is a blood sugar below 70mg/dl. Check your blood sugar if you feel signs/symptoms of hypoglycemia. If your blood sugar is below 70 take 15 grams of carbohydrates (ex 4 oz of apple juice, 3-4 glucose tablets, or 4-6 oz of regular soda) wait 15 minutes and repeat blood sugar to make sure it comes up above 70.  If your blood sugar is above 70 and you are due for a meal, have a meal.  If you are not due for a meal have a snack.  This snack helps keeps your blood sugar at a safe range.      Diagnosis: HTN (hypertension)  Assessment and Plan of Treatment: Please promptly follow up with cardiology for BP check within 1 week and continued care.  Low salt diet  Activity as tolerated.  Take all medication as prescribed.  Follow up with your medical doctor for routine blood pressure monitoring at your next visit.  Notify your doctor if you have any of the following symptoms:   Dizziness, Lightheadedness, Blurry vision, Headache, Chest pain, Shortness of breath      Diagnosis: Stroke  Assessment and Plan of Treatment: You have a history of stroke. Please maintain close follow up with neurology for continued care.    Diagnosis: Rash  Assessment and Plan of Treatment: Improving.   Please continue clobetasol as prescribed for the next 10 days.   Please follow up with your primary care doctor for continued care.

## 2023-07-21 NOTE — PROGRESS NOTE ADULT - ASSESSMENT
73F female h/o DM, HTN, breast CA, respiratory arrest and cardiac arrest (2018), CVA with residual weakness, h/o trache, PEG, being worked up for asp PNA.   Assessment  DMT2: 73y Female with DM T2 with hyperglycemia, A1C 7.1%, was on oral meds and MIXED insulin at home, now on basal bolus insulin with coverage,  blood sugars now  trending up, inconsistent meal intake.  On steroids, having steroid induced hyperglycemias.    At risk for insulin stacking due to CKD, insulin being adjusted cautiously  Anticipate glucose to improve with steroid taper down  Asp PNA: ?chronic aspiration, sputum cultures, abx. Aspiration precautions.   HTN: on antihypertensive medications, monitored, asymptomatic.  CKD: Monitor labs/BMP, renal dosing.       Discussed plan and management with Attending   Osvaldo Bales NP - TEAMS  Dr Clark Rodríguez  [phone]

## 2023-07-21 NOTE — PROGRESS NOTE ADULT - SUBJECTIVE AND OBJECTIVE BOX
Subjective: Patient seen and examined. No new events except as noted.     REVIEW OF SYSTEMS:    CONSTITUTIONAL: +weakness, fevers or chills  EYES/ENT: No visual changes;  No vertigo or throat pain   NECK: No pain or stiffness  RESPIRATORY: No cough, wheezing, hemoptysis; No shortness of breath  CARDIOVASCULAR: No chest pain or palpitations  GASTROINTESTINAL: No abdominal or epigastric pain. No nausea, vomiting, or hematemesis; No diarrhea or constipation. No melena or hematochezia.  GENITOURINARY: No dysuria, frequency or hematuria  NEUROLOGICAL: No numbness or weakness  SKIN: +itching, burning, rashes, or lesions   All other review of systems is negative unless indicated above.    MEDICATIONS:  MEDICATIONS  (STANDING):  albuterol/ipratropium for Nebulization 3 milliLiter(s) Nebulizer every 6 hours  aspirin enteric coated 81 milliGRAM(s) Oral daily  atorvastatin 10 milliGRAM(s) Oral at bedtime  buDESOnide    Inhalation Suspension 0.5 milliGRAM(s) Inhalation every 12 hours  carvedilol 37.5 milliGRAM(s) Oral every 12 hours  chlorhexidine 2% Cloths 1 Application(s) Topical <User Schedule>  cholecalciferol 2000 Unit(s) Oral daily  clobetasol 0.05% Ointment 1 Application(s) Topical two times a day  cloNIDine 0.1 milliGRAM(s) Oral two times a day  dextrose 5%. 1000 milliLiter(s) (100 mL/Hr) IV Continuous <Continuous>  dextrose 5%. 1000 milliLiter(s) (50 mL/Hr) IV Continuous <Continuous>  dextrose 50% Injectable 25 Gram(s) IV Push once  dextrose 50% Injectable 12.5 Gram(s) IV Push once  dextrose 50% Injectable 25 Gram(s) IV Push once  donepezil 10 milliGRAM(s) Oral at bedtime  doxazosin 8 milliGRAM(s) Oral at bedtime  ferrous    sulfate 325 milliGRAM(s) Oral daily  fluticasone propionate 50 MICROgram(s)/spray Nasal Spray 1 Spray(s) Both Nostrils two times a day  glucagon  Injectable 1 milliGRAM(s) IntraMuscular once  insulin glargine Injectable (LANTUS) 30 Unit(s) SubCutaneous at bedtime  insulin lispro (ADMELOG) corrective regimen sliding scale   SubCutaneous at bedtime  insulin lispro (ADMELOG) corrective regimen sliding scale   SubCutaneous three times a day before meals  insulin lispro Injectable (ADMELOG) 10 Unit(s) SubCutaneous three times a day before meals  letrozole 2.5 milliGRAM(s) Oral daily  Nephro-stacia 1 Tablet(s) Oral daily  polyethylene glycol 3350 17 Gram(s) Oral two times a day  predniSONE   Tablet   Oral   sodium chloride 0.9% for Nebulization 3 milliLiter(s) Nebulizer every 6 hours  sodium chloride 0.9%. 1000 milliLiter(s) (100 mL/Hr) IV Continuous <Continuous>      PHYSICAL EXAM:  T(C): 36.9 (07-20-23 @ 21:46), Max: 36.9 (07-20-23 @ 21:46)  HR: 68 (07-21-23 @ 07:01) (65 - 74)  BP: 169/74 (07-21-23 @ 07:01) (152/76 - 181/72)  RR: 18 (07-21-23 @ 06:56) (18 - 18)  SpO2: 95% (07-21-23 @ 06:56) (95% - 99%)  Wt(kg): --  I&O's Summary    20 Jul 2023 07:01  -  21 Jul 2023 07:00  --------------------------------------------------------  IN: 150 mL / OUT: 200 mL / NET: -50 mL            Appearance: NAD  HEENT: Dry oral mucosa, PERRL, EOMI	  Lymphatic: No lymphadenopathy , no edema  Cardiovascular: Normal S1 S2, No JVD, No murmurs , Peripheral pulses palpable 2+ bilaterally  Respiratory: decreased bs 	  Gastrointestinal:  Soft, Non-tender, + BS	  Skin: generalized rash  Musculoskeletal: Normal range of motion, normal strength  Psychiatry: Mood & affect appropriate  Ext: No edema        LABS:    CARDIAC MARKERS:                                9.3    11.95 )-----------( 211      ( 20 Jul 2023 09:59 )             31.4     07-20    140  |  103  |  63<H>  ----------------------------<  238<H>  4.5   |  26  |  2.92<H>    Ca    8.8      20 Jul 2023 10:00    TPro  6.9  /  Alb  2.4<L>  /  TBili  0.2  /  DBili  x   /  AST  18  /  ALT  18  /  AlkPhos  82  07-20    proBNP:   Lipid Profile:   HgA1c:   TSH:             TELEMETRY: 	SR    ECG:  	  RADIOLOGY:   DIAGNOSTIC TESTING:  [ ] Echocardiogram:  [ ]  Catheterization:  [ ] Stress Test:    OTHER:

## 2023-07-21 NOTE — PROGRESS NOTE ADULT - NS ATTEND AMEND GEN_ALL_CORE FT
continue abx  fu afb  keep sat>90%
plan of care discussed with the NP
Chart, labs, vitals, radiology reviewed. Above H&P reviewed and edited where appropriate. Agree with history and physical exam. Agree with assessment and plan. I reviewed the overnight course of events and discussed the care with the patient/ family.  All the decisions in assessment and plan are solely made by me..

## 2023-07-21 NOTE — PROGRESS NOTE ADULT - SUBJECTIVE AND OBJECTIVE BOX
Follow Up:  drug rash    Interval History/ROS:  more awake     Allergies  hydrALAZINE (Rash)  vitamin E (Short breath; Urticaria; Hives)  NIFEdipine (Urticaria; Hives)  doxycycline (Rash)  nafcillin (Unknown)  isoniazid (Rash)    ANTIMICROBIALS:      OTHER MEDS:  MEDICATIONS  (STANDING):  acetaminophen     Tablet .. 650 every 6 hours PRN  albuterol/ipratropium for Nebulization 3 every 6 hours  aspirin enteric coated 81 daily  atorvastatin 10 at bedtime  buDESOnide    Inhalation Suspension 0.5 every 12 hours  carvedilol 37.5 every 12 hours  cloNIDine 0.1 two times a day  dextrose 50% Injectable 25 once  dextrose 50% Injectable 25 once  dextrose 50% Injectable 12.5 once  dextrose Oral Gel 15 once PRN  donepezil 10 at bedtime  doxazosin 8 at bedtime  glucagon  Injectable 1 once  guaiFENesin Oral Liquid (Sugar-Free) 100 every 6 hours PRN  hydrOXYzine hydrochloride 25 every 6 hours PRN  insulin glargine Injectable (LANTUS) 30 at bedtime  insulin lispro (ADMELOG) corrective regimen sliding scale  three times a day before meals  insulin lispro (ADMELOG) corrective regimen sliding scale  at bedtime  insulin lispro Injectable (ADMELOG) 10 three times a day before meals  letrozole 2.5 daily  polyethylene glycol 3350 17 two times a day  predniSONE   Tablet    sodium chloride 0.9% for Nebulization 3 every 6 hours      Vital Signs Last 24 Hrs  T(C): 36.5 (2023 11:50), Max: 36.9 (2023 21:46)  T(F): 97.7 (2023 11:50), Max: 98.4 (2023 21:46)  HR: 67 (2023 11:50) (65 - 74)  BP: 179/77 (2023 11:50) (168/75 - 181/72)  BP(mean): --  RR: 19 (2023 11:56) (18 - 19)  SpO2: 85% (2023 11:56) (85% - 97%)    Parameters below as of 2023 11:56  Patient On (Oxygen Delivery Method): room air        PHYSICAL EXAM:  General: WN/WD NAD, Non-toxic  Neurology: left side paresis  Respiratory: Clear to auscultation bilaterally, no distress  CV: RRR, S1S2, no murmurs, rubs or gallops  Abdominal: Soft, Non-tender, non-distended, normal bowel sounds  Extremities: No edema,  Line Sites: Clear  Skin: rash has faded                          9.0    9.05  )-----------( 187      ( 2023 13:23 )             30.4           135  |  100  |  75<H>  ----------------------------<  295<H>  4.4   |  23  |  2.87<H>    Ca    8.5      2023 13:23    TPro  7.0  /  Alb  2.6<L>  /  TBili  0.3  /  DBili  x   /  AST  27  /  ALT  36  /  AlkPhos  84        Urinalysis Basic - ( 2023 13:34 )    Color: Yellow / Appearance: Slightly Turbid / S.019 / pH: x  Gluc: x / Ketone: Negative  / Bili: Negative / Urobili: Negative   Blood: x / Protein: 300 mg/dL / Nitrite: Negative   Leuk Esterase: Small / RBC: 24 /hpf / WBC 28 /HPF   Sq Epi: x / Non Sq Epi: x / Bacteria: Negative        MICROBIOLOGY:  .Sputum Sputum  23   Culture is being performed.  --  --      .Blood Blood-Catheter  07-15-23   No growth at 5 days  --  --      .Blood Blood-Catheter  07-15-23   No growth at 5 days  --  --      .Sputum Sputum  07-15-23   Culture is being performed.  --  --      .Sputum Sputum  23   Culture is being performed.  --  --      .Sputum Sputum  23   Culture is being performed.  --  --      RADIOLOGY:    Jas Durand MD; Division of Infectious Disease; Pager: [phone]; nights and weekends: [phone]

## 2023-07-21 NOTE — DISCHARGE NOTE PROVIDER - NSDCFUADDAPPT_GEN_ALL_CORE_FT
APPTS ARE READY TO BE MADE: [ X] YES    Best Family or Patient Contact (if needed):    Additional Information about above appointments (if needed):    1: Endocrine  2: Pulmonary  3: PCP  4: Nephrology  5: Neurology    Other comments or requests:    APPTS ARE READY TO BE MADE: [ X] YES    Best Family or Patient Contact (if needed):    Additional Information about above appointments (if needed):    1: Endocrine  2: Pulmonary  3: PCP  4: Nephrology  5: Neurology    Other comments or requests:   Patient is scheduled to see Dr. Barajas(Nephro) at 11:00AM on 8/11 at [facility] [address], [place] [address]    Patient/Caregiver was provided with follow up request details and was warm transferred to Dr. Weiner office to schedule the appointment on their own.     Patient/Caregiver was provided with follow up request details and prefers to call the providers office on their own to schedule.  APPTS ARE READY TO BE MADE: [ X] YES    Best Family or Patient Contact (if needed):    Additional Information about above appointments (if needed):    1: Endocrine  2: Pulmonary  3: PCP  4: Nephrology  5: Neurology    Other comments or requests:   Patient is scheduled to see Dr. Dunn at 1:45PM on 7/31 at [address] [place] [address]    Patient is scheduled to see Dr. Barajas(Nephro) at 11:00AM on 8/11 at [facility] [place] [address]    Patient/Caregiver was provided with follow up request details and was warm transferred to Dr. Weiner office to schedule the appointment on their own.     Patient/Caregiver was provided with follow up request details and prefers to call the providers office on their own to schedule.

## 2023-07-21 NOTE — PROGRESS NOTE ADULT - ASSESSMENT
72 y/o female with multiple medical problems whom i see in my practice on outptn basis. last home visit marichuy 15   Ptn has h/o DM, HTN, breast CA, respiratory arrest and cardiac arrest (2018), CVA with residual weakness, h/o trache, PEG, both removed, now eating on her own but aspirates and has post prandial cough in addition to chronic productive cough. presumed 2/2 silent aspiration of her saliva this was d/w Dr. Jose Almendarez who is in agreement    Today ptn presents to the ED with  complaining of worsening cough and orthopnea. ptn has chronic LE edema.   Denies any headache, fever, chills, chest pain, abdominal pain, n/v/d, dysuria. Unable to obtain any history or ROS due to mental status.    A/P  7/12: Aspiration PNA, cannot R/O BRYAN. check sputum culture, acute on chronic diastolic chf, , chronic aspiration  ptn has multiple ABx allergies, start Cipro and flagyl which she tolerated in the past  cont outptn meds, get S&S eval  insulin on a sliding scale, RUSS, check bladder sono, renal sono  card, renal, pulm, endo ID called  cont outptn meds  7/13: ptn is awake, alert, daughter at bedside. daughter [facility], wants me to cont seeing her on outptn at home and her but also wants to cont w [facility] outptn. i explained to her she needs to pick a pcp, she cannot have me and [facility] in the hospital. she wants to stay with me, if her father changes his mind, she will let me know.   Ptn looks better today, more communicative, smiling, eating rice pudding, coughing after each bite. awaitng S&S, will need MBS. didnt tolerate FEEST in the past. ptn is hungry. she used to have a trache and a PEG post CVA, was removed few years ago. HTN is not controlled coreg raised to 37.5 bid. ptn seen by Renal, Scr at baseline, started on Farxiga and will start Losartan 25 mg    7/14: MBS done: recs are small soft bite food w mildly thickened liquids. renal, card, ID, pulm, endo input appreciated. Ptn is in isolation, being ruled out for active TB. 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 now on CEFEPIME, sputum for TB ordered. ptn is lethargic, BP improved, daughter feels its 2/2 CLONIDINE. as per renal start LOSARTAN. i started LOSARTAN yesterday, but it was DCed. lethargy could be due to overdiuresis. remains on LASIX 40 mg q12H. family wants the ptn to go home . awaiting ID clearance prior to DC. daughter c/o LE weakness and inability to ambulate, neuro called    7/15: ptn is on ABx for PNA, being worked up for TB, seen by Neuro. no immediate interventions planned. cont present meds    7/16:  ptn is lethargic, has a rash, daughter is concerned its a reaction to the ABx, nonpruritic. daughter is refusing to start LOSARTAN. discussed its renal protective in ptns w DM. green sputum resolved, now its clear-yellow    7/17: ptn has an allergic rash, will start ATARAX for it. cleared by ID to DC isolation, DC ABx, dc planning tomorrow daughter wants home,  wants rehab, will decide in am    7/18: ptn is lethargic, has a diffuse rash, has pruritis, looks allergic in nature, will place on steroids, derm called, ABx DCed 7/17. completed course of Abx. AFB sputum neg, AFB Cx pending. green thick sputum resolved, now clear to yellow. cleared by speech therapy to eat a dysphagia diet. sleep wake cycle is off. stressed to family importance of a regular sleep wake cycle. PT eval    7/19: s/p treatment for PNA, developed a rash subsequently, prob an allergic response to ABx. started solumedrol yesterday 3/3 severity of the rash and edema, today much better, seen by derm, has hyperglycemia 2/2 steroids, will adjust insulin. ptn is more awake today, ate well today    7/20: rash improving, eating well, dc home in am, arrange home O2. prednisone taper    7/21: ptn qualifies for home O2, will get 4 hrs of IVF. dc planning once home O2 gets delivered. rash is resolving                        dvt ppx w HSC

## 2023-07-21 NOTE — DISCHARGE NOTE PROVIDER - CARE PROVIDER_API CALL
Carmen Rollins  Internal Medicine  [address] [place] [address], [address]  [place] [address]  Phone: [phone]  Fax: [phone]  Follow Up Time: 1 week    Luis Mcnulty  Pulmonary Disease  [address] [place] [address]  Phone: [phone]  Fax: [phone]  Follow Up Time: 2 weeks    Jimmy Colon  Nephrology  [facility] [address]  Phone: [phone]  Fax: [phone]  Follow Up Time: Routine   Carmen Rollins  Internal Medicine  [address] [place] [address], [address]  [place] [address]  Phone: [phone]  Fax: [phone]  Follow Up Time: 1 week    Luis Mcnulty  Pulmonary Disease  [address] [place] [address]  Phone: [phone]  Fax: [phone]  Follow Up Time: 2 weeks    Jimmy Colon  Nephrology  [address] [facility], [facility] [address]  [place] [address]  Phone: [phone]  Fax: [phone]  Follow Up Time: Routine    Aubrie Quiroga  [facility]  Phone: [phone]  Fax: [phone]  Follow Up Time: 1 week    Patria Weiner  Endocrinology/Metab/Diabetes  [address] [facility], [address]  [place] [address]  Phone: [phone]  Fax: [phone]  Follow Up Time: 1 week    Ramiro Garner  Neurology  [address] [facility], [facility] [address]  [place] [address]  Phone: [phone]  Fax: [phone]  Follow Up Time: 1 week

## 2023-07-21 NOTE — DISCHARGE NOTE PROVIDER - PROVIDER TOKENS
PROVIDER:[TOKEN:[[id number]],FOLLOWUP:[1 week]],PROVIDER:[TOKEN:[[id number]],FOLLOWUP:[2 weeks]],PROVIDER:[TOKEN:[[id number]],FOLLOWUP:[Routine]] PROVIDER:[TOKEN:[[id number]],FOLLOWUP:[1 week]],PROVIDER:[TOKEN:[[id number]],FOLLOWUP:[2 weeks]],PROVIDER:[TOKEN:[[id number]],FOLLOWUP:[Routine]],PROVIDER:[TOKEN:[[id number]],FOLLOWUP:[1 week]],PROVIDER:[TOKEN:[[id number]],FOLLOWUP:[1 week]],PROVIDER:[TOKEN:[[id number]],FOLLOWUP:[1 week]]

## 2023-07-21 NOTE — CHART NOTE - NSCHARTNOTEFT_GEN_A_CORE
Medicine NP([id number])    Pt. evaluated today for Diagnosis of aspiration pneumonia and diastolic heart failure. Pt. has history of CVA, with  respiratory  and cardiopulmonary arrest.   While in a chronic and stable state the pt. is still hypoxic due to baseline diastolic heart failure. SPO2 85% while at rest on room air.   With 2 liters oxygen via N/C SPO2 at rest improved to 95%.  Pt. will require oxygen 24 hours.

## 2023-07-21 NOTE — PROGRESS NOTE ADULT - ASSESSMENT
73F admitted 7/12/23 for  with a PMH significant for respiratory distress in the setting of possible Aspiration pneumonia and CHF exacerbation.      Latent TB, In April 2017 was provided with INH - developed rash after 6 weeks and changed to other medication  (?ifampim) for additonal 3 months as per martinez  Breast Cancer 2/17  Aspiration 2/2 CVA  DM  CHF    Previously hospitalized   March 2018 with flu, resp arrest RUSS  MSSA bacteremia  - has PEA, trach and PEG  5/12 --> 5/22/18  Hospitalized with bradycardia  - has superficial abscess on back  I&D: no grwoth  Rx: Zosyn 5/13 --> 5/16/18 12/29/21 --> 1/4/22    was treated with CIPRO/Flagyl 12/30/21 --> 1/5/22  -   daughter states that she developed CDIFF after discharge     proBNP on arrival to ED was 8075 diuresed w/ Lasix; Cardiology and Pulm following  Today  patient is ill appearing with frequent cough  7/12/23 Chest CT notable for "Upper lung predominant bilateral multifocal consolidation with branching   tubular opacities, suggestive of endobronchial spread of infection.   Interval right apical thick-walled cyst since 2019. TB should be   considered in the appropriate clinical setting"  7/14 fatigued     developed pruritic rash and somnolence  Sputum AFB  NEG x3: 7/14, 7/15, 7/16   7/15 BC x2 NGTD  7/17 aspiration noted  7/18, 7/19 macular rash persists  -- no resp decompensation off antibiotics  Methylprednisolone 40 every 12  hours  7/18 -->  7/19 hyperglycemia  7/20 wbc = 11.95;  6.2% eos  DERm  possible Cefepime reaction  7/21  rash improved    Antibiotics  Cipro  7/13  Flagyl  7/13  Cefepime 7/13-->7/15  Zosyn 7/26-->17    SUGGEST  maintain off antibiotics    would consider carbapenems and floroquinolones in future  discharge home now anticipated 7/22    Please call ID if needed

## 2023-07-21 NOTE — PROGRESS NOTE ADULT - NS ATTEND OPT1 GEN_ALL_CORE
I independently performed the documented:
I attest my time as attending is greater than 50% of the total combined time spent on qualifying patient care activities by the PA/NP and attending.

## 2023-07-21 NOTE — CHART NOTE - NSCHARTNOTEFT_GEN_A_CORE
Medicine NP ([id number])    Pt needs a commode since she is confined to a single level  at home without toilet.

## 2023-07-21 NOTE — PROGRESS NOTE ADULT - SUBJECTIVE AND OBJECTIVE BOX
Chief complaint  Patient is a 73y old  Female who presents with a chief complaint of respiratory distress (21 Jul 2023 11:28)         Labs and Fingersticks  CAPILLARY BLOOD GLUCOSE      POCT Blood Glucose.: 279 mg/dL (21 Jul 2023 13:11)  POCT Blood Glucose.: 236 mg/dL (21 Jul 2023 11:46)  POCT Blood Glucose.: 265 mg/dL (21 Jul 2023 08:35)  POCT Blood Glucose.: 185 mg/dL (20 Jul 2023 21:39)  POCT Blood Glucose.: 200 mg/dL (20 Jul 2023 16:49)      Anion Gap: 11 (07-20 @ 10:00)      Calcium: 8.8 (07-20 @ 10:00)  Albumin: 2.4 *L* (07-20 @ 10:00)    Alanine Aminotransferase (ALT/SGPT): 18 (07-20 @ 10:00)  Alkaline Phosphatase: 82 (07-20 @ 10:00)  Aspartate Aminotransferase (AST/SGOT): 18 (07-20 @ 10:00)        07-20    140  |  103  |  63<H>  ----------------------------<  238<H>  4.5   |  26  |  2.92<H>    Ca    8.8      20 Jul 2023 10:00    TPro  6.9  /  Alb  2.4<L>  /  TBili  0.2  /  DBili  x   /  AST  18  /  ALT  18  /  AlkPhos  82  07-20                        9.3    11.95 )-----------( 211      ( 20 Jul 2023 09:59 )             31.4     Medications  MEDICATIONS  (STANDING):  albuterol/ipratropium for Nebulization 3 milliLiter(s) Nebulizer every 6 hours  aspirin enteric coated 81 milliGRAM(s) Oral daily  atorvastatin 10 milliGRAM(s) Oral at bedtime  buDESOnide    Inhalation Suspension 0.5 milliGRAM(s) Inhalation every 12 hours  carvedilol 37.5 milliGRAM(s) Oral every 12 hours  chlorhexidine 2% Cloths 1 Application(s) Topical <User Schedule>  cholecalciferol 2000 Unit(s) Oral daily  clobetasol 0.05% Ointment 1 Application(s) Topical two times a day  cloNIDine 0.1 milliGRAM(s) Oral two times a day  dextrose 5%. 1000 milliLiter(s) (100 mL/Hr) IV Continuous <Continuous>  dextrose 5%. 1000 milliLiter(s) (50 mL/Hr) IV Continuous <Continuous>  dextrose 50% Injectable 25 Gram(s) IV Push once  dextrose 50% Injectable 25 Gram(s) IV Push once  dextrose 50% Injectable 12.5 Gram(s) IV Push once  donepezil 10 milliGRAM(s) Oral at bedtime  doxazosin 8 milliGRAM(s) Oral at bedtime  ferrous    sulfate 325 milliGRAM(s) Oral daily  fluticasone propionate 50 MICROgram(s)/spray Nasal Spray 1 Spray(s) Both Nostrils two times a day  glucagon  Injectable 1 milliGRAM(s) IntraMuscular once  insulin glargine Injectable (LANTUS) 30 Unit(s) SubCutaneous at bedtime  insulin lispro (ADMELOG) corrective regimen sliding scale   SubCutaneous at bedtime  insulin lispro (ADMELOG) corrective regimen sliding scale   SubCutaneous three times a day before meals  insulin lispro Injectable (ADMELOG) 10 Unit(s) SubCutaneous three times a day before meals  letrozole 2.5 milliGRAM(s) Oral daily  Nephro-stacia 1 Tablet(s) Oral daily  polyethylene glycol 3350 17 Gram(s) Oral two times a day  predniSONE   Tablet   Oral   sodium chloride 0.9% for Nebulization 3 milliLiter(s) Nebulizer every 6 hours  sodium chloride 0.9%. 1000 milliLiter(s) (250 mL/Hr) IV Continuous <Continuous>  sodium chloride 0.9%. 1000 milliLiter(s) (100 mL/Hr) IV Continuous <Continuous>      Physical Exam  General: Patient comfortable in bed / chair  Vital Signs Last 12 Hrs  T(F): 97.7 (07-21-23 @ 11:50), Max: 97.7 (07-21-23 @ 11:50)  HR: 67 (07-21-23 @ 11:50) (67 - 74)  BP: 179/77 (07-21-23 @ 11:50) (169/74 - 179/77)  BP(mean): --  RR: 19 (07-21-23 @ 11:56) (18 - 19)  SpO2: 85% (07-21-23 @ 11:56) (85% - 97%)    CVS: S1S2   Respiratory: No wheezing, no crepitations  GI: Abdomen soft, bowel sounds positive  Musculoskeletal:  moves all extremities  : Voiding / Wheeler ESRD/AVF

## 2023-07-21 NOTE — CHART NOTE - NSCHARTNOTESELECT_GEN_ALL_CORE
Dermatology Chart Note/Event Note
Event Note
need for commode/Event Note
Event Note
Fever
Hyperglycemia/Event Note
Wheelchair for outpatient use/Event Note
lucrecia lift/Event Note
note for home O2/Event Note

## 2023-07-22 NOTE — PROGRESS NOTE ADULT - PROBLEM SELECTOR PROBLEM 1
Diabetes
SOB (shortness of breath)
Diabetes
SOB (shortness of breath)
Diabetes
SOB (shortness of breath)
Diabetes
SOB (shortness of breath)
SOB (shortness of breath)

## 2023-07-22 NOTE — PROGRESS NOTE ADULT - PROBLEM SELECTOR PROBLEM 3
Diabetes
HTN (hypertension)
HTN (hypertension)
Diabetes
Diabetes
HTN (hypertension)
Diabetes
HTN (hypertension)
Diabetes
HTN (hypertension)
Diabetes
HTN (hypertension)
HTN (hypertension)
Diabetes

## 2023-07-22 NOTE — PROGRESS NOTE ADULT - ASSESSMENT
74 y/o female with DM, HTN, breast CA, respiratory arrest and cardiac arrest (2018), L PCA stroke 2018 s/p ILR, h/o trache, PEG, both removed, now eating on her own but aspirates and has post prandial cough in addition to chronic productive cough with c/f silent aspiration.      was walking with walker until ~Oct 2022 per daughter now wheelchair bound.   spoke iwth daughter at bedside who states for stroke she follows with house neurology Dr. Ramiro Garner and Renae Vargas for stroke as well as movement dr. Lance Vizcaino.   MRI brain 2018 B/L centrum semi ovale watershed infarcts   o/e AAOx1-2, mild dysarthira, RHHA follows simple commands, uppers 3-4/5 lowers 2-3/5, question of asterixes in uppers  states had MRI at [facility] radiology 4/2023 which was unchanged from prior imaging  NIHSS ~18 premrs 4  + RUSS   TTE done 7/13 7/17, more lethargic today.  c/f possible aspiration ; cefepime changed to zosyn. check CTH   CTH with chronic L occipital infarct   7/18 mental status improved compared to yesterday. family agrees.    no neuro changes     Impression:   1) L PCA stroke, ESUS s/p ILR ; also with bilateral centrum semiovale watershed infarcts   2) weakness likely multifactorial, deconditioning, LE edema, anemia, RUSS, prior stroke and cardiopulmnoary arrest, prior strokes now possible new infection/aspiration   3) Dementia     - f/u derm for rash r/o DRESS. Clobetazol cream   - c/w asa 81mg daily and statin therapy, atorvastating 10mg PO QHS for secondary stroke prevention  - treatment of infection/aspiration per primary team. was on cefepime ; concern for drug rash, changed to zosyn, now off   - for dementia she is on donepezil 10mg PO QHS  - can check b12, RPR, TSH for reversible causes of dementia   - on airborne precuations to r/o TB  now off AFB neg   - Hemoglobin A1c and lipid panel  - telemetry  - PT/OT/SS/SLP, OOBC  - check FS, glucose control <180  - GI/DVT ppx  - Differential diagnosis and plan of care discussed with patient and/or family and primary team  - Thank you for allowing me to participate in the care of this patient. Call with questions.   - spoke with daughter at bedside 7/15 and 7/17 and 7/18 ; spoke with  7/19 ; daughter 7/20   dc planning await home delivery of O2    follows with house neurology Dr. Ramiro Garner and Renae Vargas for stroke as well as movement dr. Lance Vizcaino. who she should follow up outpatient   Anoop Bianchi MD  Vascular Neurology  Office: [phone]      74 y/o female with DM, HTN, breast CA, respiratory arrest and cardiac arrest (2018), L PCA stroke 2018 s/p ILR, h/o trache, PEG, both removed, now eating on her own but aspirates and has post prandial cough in addition to chronic productive cough with c/f silent aspiration.      was walking with walker until ~Oct 2022 per daughter now wheelchair bound.   spoke iwth daughter at bedside who states for stroke she follows with house neurology Dr. Ramiro Garner and Renae Vargas for stroke as well as movement dr. Lance Vizcaino.   MRI brain 2018 B/L centrum semi ovale watershed infarcts   o/e AAOx1-2, mild dysarthira, RHHA follows simple commands, uppers 3-4/5 lowers 2-3/5, question of asterixes in uppers  states had MRI at [facility] radiology 4/2023 which was unchanged from prior imaging  NIHSS ~18 premrs 4  + RUSS   TTE done 7/13 7/17, more lethargic today.  c/f possible aspiration ; cefepime changed to zosyn. check CTH   CTH with chronic L occipital infarct   7/18 mental status improved compared to yesterday. family agrees.    no neuro changes     Impression:   1) L PCA stroke, ESUS s/p ILR ; also with bilateral centrum semiovale watershed infarcts   2) weakness likely multifactorial, deconditioning, LE edema, anemia, RUSS, prior stroke and cardiopulmnoary arrest, prior strokes now possible new infection/aspiration   3) Dementia     - f/u derm for rash r/o DRESS. Clobetazol cream   - c/w asa 81mg daily and statin therapy, atorvastating 10mg PO QHS for secondary stroke prevention  - treatment of infection/aspiration per primary team. was on cefepime ; concern for drug rash, changed to zosyn, now off   - for dementia she is on donepezil 10mg PO QHS  - can check b12, RPR, TSH for reversible causes of dementia   - was on airborne precuations to r/o TB  now off AFB neg   - telemetry  - steroid taper   - PT/OT/SS/SLP, OOBC  - check FS, glucose control <180  - GI/DVT ppx  - Thank you for allowing me to participate in the care of this patient. Call with questions.   - spoke with daughter at bedside 7/15 and 7/17 and 7/18 ; spoke with  7/19 ; daughter 7/20   dc planning await home delivery of O2    follows with house neurology Dr. Ramiro Garner and Renae Vargas for stroke as well as movement dr. Lance Vizcaino. who she should follow up outpatient   Anoop Bianchi MD  Vascular Neurology  Office: [phone]

## 2023-07-22 NOTE — PROGRESS NOTE ADULT - SUBJECTIVE AND OBJECTIVE BOX
Neurology Progress Note    S: Patient seen and examined. dc planning     Medication:    MEDICATIONS  (STANDING):  albuterol/ipratropium for Nebulization 3 milliLiter(s) Nebulizer every 6 hours  aspirin enteric coated 81 milliGRAM(s) Oral daily  atorvastatin 10 milliGRAM(s) Oral at bedtime  buDESOnide    Inhalation Suspension 0.5 milliGRAM(s) Inhalation every 12 hours  carvedilol 37.5 milliGRAM(s) Oral every 12 hours  chlorhexidine 2% Cloths 1 Application(s) Topical <User Schedule>  cholecalciferol 2000 Unit(s) Oral daily  clobetasol 0.05% Ointment 1 Application(s) Topical two times a day  cloNIDine 0.1 milliGRAM(s) Oral two times a day  dextrose 5%. 1000 milliLiter(s) (100 mL/Hr) IV Continuous <Continuous>  dextrose 5%. 1000 milliLiter(s) (50 mL/Hr) IV Continuous <Continuous>  dextrose 50% Injectable 25 Gram(s) IV Push once  dextrose 50% Injectable 12.5 Gram(s) IV Push once  dextrose 50% Injectable 25 Gram(s) IV Push once  donepezil 10 milliGRAM(s) Oral at bedtime  doxazosin 8 milliGRAM(s) Oral at bedtime  ferrous    sulfate 325 milliGRAM(s) Oral daily  fluticasone propionate 50 MICROgram(s)/spray Nasal Spray 1 Spray(s) Both Nostrils two times a day  glucagon  Injectable 1 milliGRAM(s) IntraMuscular once  insulin glargine Injectable (LANTUS) 34 Unit(s) SubCutaneous at bedtime  insulin lispro (ADMELOG) corrective regimen sliding scale   SubCutaneous three times a day before meals  insulin lispro (ADMELOG) corrective regimen sliding scale   SubCutaneous at bedtime  insulin lispro Injectable (ADMELOG) 12 Unit(s) SubCutaneous three times a day before meals  letrozole 2.5 milliGRAM(s) Oral daily  Nephro-stacia 1 Tablet(s) Oral daily  polyethylene glycol 3350 17 Gram(s) Oral two times a day  predniSONE   Tablet   Oral   sodium chloride 0.9% for Nebulization 3 milliLiter(s) Nebulizer every 6 hours  sodium chloride 0.9%. 1000 milliLiter(s) (100 mL/Hr) IV Continuous <Continuous>  sodium chloride 0.9%. 1000 milliLiter(s) (250 mL/Hr) IV Continuous <Continuous>    MEDICATIONS  (PRN):  acetaminophen     Tablet .. 650 milliGRAM(s) Oral every 6 hours PRN Temp greater or equal to 38C (100.4F), Mild Pain (1 - 3), Moderate Pain (4 - 6)  dextrose Oral Gel 15 Gram(s) Oral once PRN Blood Glucose LESS THAN 70 milliGRAM(s)/deciliter  guaiFENesin Oral Liquid (Sugar-Free) 100 milliGRAM(s) Oral every 6 hours PRN Cough  hydrOXYzine hydrochloride 25 milliGRAM(s) Oral every 6 hours PRN Itching        Vitals:    Vital Signs Last 24 Hrs  T(C): 36.8 (07-22-23 @ 05:00), Max: 36.8 (07-22-23 @ 05:00)  T(F): 98.2 (07-22-23 @ 05:00), Max: 98.2 (07-22-23 @ 05:00)  HR: 68 (07-22-23 @ 05:00) (60 - 68)  BP: 162/63 (07-22-23 @ 05:00) (162/63 - 189/79)  BP(mean): --  RR: 18 (07-22-23 @ 05:00) (18 - 19)  SpO2: 97% (07-22-23 @ 05:00) (85% - 99%)          General Exam:   General Appearance: Appropriately dressed and in no acute distress       Head: Normocephalic, atraumatic and no dysmorphic features  Ear, Nose, and Throat: Moist mucous membranes  CVS: S1S2+  Resp: No SOB, no wheeze or rhonchi  Abd: soft NTND  Extremities: No edema, no cyanosis  Skin: + rash     Neurological Exam:    Neurological Exam:  Mental Status: Awake, alert and oriented x 1 (more lethargic 7/17.  not following commands. minimal verbal   Cranial Nerves: PERRL, EOMI, R HHA, sensation V1-V3 intact,  mild facial asymmetry, equal elevation of palate, scm/trap 5/5, tongue is midline on protrusion. no obvious papilledema on fundoscopic exam. hearing is grossly intact.   Motor: uppers 3-4/5, lowers 2-3/5 question of asterixes previously.  now not really participating [facility] exam   Sensation: Intact to light touch and pinprick throughout. no right/left confusion. no extinction to tactile on DSS.    Reflexes: 1+ throughout at biceps, brachioradialis, triceps, patellars and ankles bilaterally and equal. No clonus. R toe and L toe were both downgoing.  Coordination: No dysmetria on FNF  unable in lowers   Gait: wheelchair bound         I personally reviewed the below data/images/labs:  CBC Full  -  ( 21 Jul 2023 13:23 )  WBC Count : 9.05 K/uL  RBC Count : 3.11 M/uL  Hemoglobin : 9.0 g/dL  Hematocrit : 30.4 %  Platelet Count - Automated : 187 K/uL  Mean Cell Volume : 97.7 fl  Mean Cell Hemoglobin : 28.9 pg  Mean Cell Hemoglobin Concentration : 29.6 gm/dL  Auto Neutrophil # : 7.37 K/uL  Auto Lymphocyte # : 1.37 K/uL  Auto Monocyte # : 0.32 K/uL  Auto Eosinophil # : 0.00 K/uL  Auto Basophil # : 0.00 K/uL  Auto Neutrophil % : 80.5 %  Auto Lymphocyte % : 15.1 %  Auto Monocyte % : 3.5 %  Auto Eosinophil % : 0.0 %  Auto Basophil % : 0.0 %    07-21    135  |  100  |  75<H>  ----------------------------<  295<H>  4.4   |  23  |  2.87<H>    Ca    8.5      21 Jul 2023 13:23    TPro  7.0  /  Alb  2.6<L>  /  TBili  0.3  /  DBili  x   /  AST  27  /  ALT  36  /  AlkPhos  84  07-21      Urinalysis Basic - ( 16 Jul 2023 09:19 )    Color: x / Appearance: x / SG: x / pH: x  Gluc: 177 mg/dL / Ketone: x  / Bili: x / Urobili: x   Blood: x / Protein: x / Nitrite: x   Leuk Esterase: x / RBC: x / WBC x   Sq Epi: x / Non Sq Epi: x / Bacteria: x      < from: CT Head No Cont (07.17.23 @ 22:44) >    ACC: [id number] EXAM:  CT BRAIN   ORDERED BY: CUONG WILLIAMSON     PROCEDURE DATE:  07/17/2023          INTERPRETATION:  .    CLINICAL INFORMATION: Altered mental status.    TECHNIQUE: Multiple axial CT images of the head were obtained without   contrast. Sagittal and coronal reconstructed images were acquired from   the source data.    COMPARISON: Prior brain MRI study from 2/6/2018. Prior CT study of the   head from 2/3/2018.    FINDINGS: Encephalomalacia and gliosis is seen within the left occipital   lobe is chronic infarction.    There is no acute intracranial hemorrhage, mass effect, shift of the   midline structures, herniation, hydrocephalus, or abnormal extra axial   fluid collection.    There is diffuse cerebral volume loss with prominence of the sulci,   fissures, and cisternal spaces which is normal for the patient's age.   There is mild periventricular white matter hypoattenuation statistically   compatible with microvascular type changes given calcific atherosclerotic   disease of the intracranial arteries.    The paranasal sinuses and right mastoid cavity is clear. There is under   pneumatization and sclerosis of the left hip in the mastoid cavity. Soft   tissue is seen within the left middle ear.    There is evidenceof bilateral cataract removal.    Multiple rounded lucencies in the skull are nonspecific.    IMPRESSION: No acute intracranial findings.    Chronic left occipital lobe infarct an similar-appearing mild chronic   white matter microvascular type changes.    --- End of Report ---            RAMÓN VIGIL MD; Attending Radiologist  This document has been electronically signed. Jul 18 2023  8:00AM    < end of copied text >

## 2023-07-22 NOTE — PROGRESS NOTE ADULT - PROBLEM SELECTOR PROBLEM 2
Stroke
SOB (shortness of breath)
Stroke
SOB (shortness of breath)
Stroke
Stroke
SOB (shortness of breath)
Stroke
SOB (shortness of breath)
Stroke
SOB (shortness of breath)
SOB (shortness of breath)
Stroke

## 2023-07-22 NOTE — PROGRESS NOTE ADULT - PROVIDER SPECIALTY LIST ADULT
Endocrinology
Endocrinology
Internal Medicine
Nephrology
Nephrology
Neurology
Neurology
Pulmonology
Infectious Disease
Internal Medicine
Nephrology
Neurology
Pulmonology
Endocrinology
Infectious Disease
Infectious Disease
Internal Medicine
Nephrology
Neurology
Pulmonology
Pulmonology
Infectious Disease
Internal Medicine
Internal Medicine
Neurology
Podiatry
Pulmonology
Cardiology
Cardiology
Endocrinology
Cardiology
Endocrinology
Cardiology
Cardiology
Endocrinology
Cardiology

## 2023-07-22 NOTE — PROGRESS NOTE ADULT - PROBLEM SELECTOR PLAN 3
RISS
Suggest to continue medications, monitoring, FU primary team recommendations.
Suggest to continue medications, monitoring, FU primary team recommendations.
RISS
Suggest to continue medications, monitoring, FU primary team recommendations.
Suggest to continue medications, monitoring, FU primary team recommendations.
RISS
Suggest to continue medications, monitoring, FU primary team recommendations.
Suggest to continue medications, monitoring, FU primary team recommendations.
RISS
Suggest to continue medications, monitoring, FU primary team recommendations.
RISS
RISS
Suggest to continue medications, monitoring, FU primary team recommendations.
Suggest to continue medications, monitoring, FU primary team recommendations.
RISS

## 2023-07-22 NOTE — PROGRESS NOTE ADULT - ASSESSMENT
73F female h/o DM, HTN, breast CA, respiratory arrest and cardiac arrest (2018), CVA with residual weakness, h/o trache, PEG, being worked up for asp PNA.   Assessment  DMT2: 73y Female with DM T2 with hyperglycemia, A1C 7.1%, was on oral meds and MIXED insulin at home, now on basal bolus insulin with coverage,  blood sugars now  trending up, inconsistent with meal intake.  On steroids, having steroid induced hyperglycemias, post prandial  At risk for insulin stacking due to CKD, insulin being adjusted cautiously  Anticipate glucose to improve with steroid taper down  Asp PNA: ?chronic aspiration, sputum cultures, abx. Aspiration precautions.   HTN: on antihypertensive medications, monitored, asymptomatic.  CKD: Monitor labs/BMP, renal dosing.       Discussed plan and management with Attending   Osvaldo Bales NP - TEAMS  Dr Clark Rodríguez  [phone]

## 2023-07-22 NOTE — PROGRESS NOTE ADULT - PROBLEM SELECTOR PLAN 2
Suggest to continue medications, monitoring, FU primary team recommendations.
Suggest to continue medications, monitoring, FU primary team recommendations.
stable  ASA
CT H - stable, no acute findings   ASA
CT H - stable, no acute findings   ASA
Suggest to continue medications, monitoring, FU primary team recommendations.
Suggest to continue medications, monitoring, FU primary team recommendations.
CT H - stable, no acute findings   ASA
Suggest to continue medications, monitoring, FU primary team recommendations.
stable  ASA  check CT head
Suggest to continue medications, monitoring, FU primary team recommendations.
Suggest to continue medications, monitoring, FU primary team recommendations.
CT H - stable, no acute findings   ASA
Suggest to continue medications, monitoring, FU primary team recommendations.
stable  ASA
stable  ASA
Suggest to continue medications, monitoring, FU primary team recommendations.
CT H - stable, no acute findings   ASA

## 2023-07-22 NOTE — PROGRESS NOTE ADULT - SUBJECTIVE AND OBJECTIVE BOX
Patient is a 73y old  Female who presents with a chief complaint of respiratory distress (22 Jul 2023 13:36)      SUBJECTIVE / OVERNIGHT EVENTS: ptn feels well,     MEDICATIONS  (STANDING):  albuterol/ipratropium for Nebulization 3 milliLiter(s) Nebulizer every 6 hours  aspirin enteric coated 81 milliGRAM(s) Oral daily  atorvastatin 10 milliGRAM(s) Oral at bedtime  buDESOnide    Inhalation Suspension 0.5 milliGRAM(s) Inhalation every 12 hours  carvedilol 37.5 milliGRAM(s) Oral every 12 hours  chlorhexidine 2% Cloths 1 Application(s) Topical <User Schedule>  cholecalciferol 2000 Unit(s) Oral daily  clobetasol 0.05% Ointment 1 Application(s) Topical two times a day  cloNIDine 0.1 milliGRAM(s) Oral two times a day  dextrose 5%. 1000 milliLiter(s) (100 mL/Hr) IV Continuous <Continuous>  dextrose 5%. 1000 milliLiter(s) (50 mL/Hr) IV Continuous <Continuous>  dextrose 50% Injectable 25 Gram(s) IV Push once  dextrose 50% Injectable 12.5 Gram(s) IV Push once  dextrose 50% Injectable 25 Gram(s) IV Push once  donepezil 10 milliGRAM(s) Oral at bedtime  doxazosin 8 milliGRAM(s) Oral at bedtime  ferrous    sulfate 325 milliGRAM(s) Oral daily  fluticasone propionate 50 MICROgram(s)/spray Nasal Spray 1 Spray(s) Both Nostrils two times a day  glucagon  Injectable 1 milliGRAM(s) IntraMuscular once  insulin glargine Injectable (LANTUS) 34 Unit(s) SubCutaneous at bedtime  insulin lispro (ADMELOG) corrective regimen sliding scale   SubCutaneous three times a day before meals  insulin lispro (ADMELOG) corrective regimen sliding scale   SubCutaneous at bedtime  insulin lispro Injectable (ADMELOG) 12 Unit(s) SubCutaneous three times a day before meals  letrozole 2.5 milliGRAM(s) Oral daily  Nephro-stacia 1 Tablet(s) Oral daily  polyethylene glycol 3350 17 Gram(s) Oral two times a day  predniSONE   Tablet   Oral   sodium chloride 0.9% for Nebulization 3 milliLiter(s) Nebulizer every 6 hours  sodium chloride 0.9%. 1000 milliLiter(s) (100 mL/Hr) IV Continuous <Continuous>  sodium chloride 0.9%. 1000 milliLiter(s) (250 mL/Hr) IV Continuous <Continuous>    MEDICATIONS  (PRN):  acetaminophen     Tablet .. 650 milliGRAM(s) Oral every 6 hours PRN Temp greater or equal to 38C (100.4F), Mild Pain (1 - 3), Moderate Pain (4 - 6)  dextrose Oral Gel 15 Gram(s) Oral once PRN Blood Glucose LESS THAN 70 milliGRAM(s)/deciliter  guaiFENesin Oral Liquid (Sugar-Free) 100 milliGRAM(s) Oral every 6 hours PRN Cough  hydrOXYzine hydrochloride 25 milliGRAM(s) Oral every 6 hours PRN Itching      Vital Signs Last 24 Hrs  T(F): 97.3 (07-22-23 @ 11:30), Max: 98.2 (07-22-23 @ 05:00)  HR: 65 (07-22-23 @ 11:30) (60 - 68)  BP: 165/68 (07-22-23 @ 11:30) (162/63 - 189/79)  RR: 18 (07-22-23 @ 11:30) (18 - 18)  SpO2: 96% (07-22-23 @ 11:30) (94% - 99%)  Telemetry:   CAPILLARY BLOOD GLUCOSE      POCT Blood Glucose.: 212 mg/dL (22 Jul 2023 12:01)  POCT Blood Glucose.: 211 mg/dL (22 Jul 2023 07:57)  POCT Blood Glucose.: 165 mg/dL (21 Jul 2023 21:04)  POCT Blood Glucose.: 278 mg/dL (21 Jul 2023 16:03)    I&O's Summary    21 Jul 2023 07:01  -  22 Jul 2023 07:00  --------------------------------------------------------  IN: 1150 mL / OUT: 100 mL / NET: 1050 mL    22 Jul 2023 07:01  -  22 Jul 2023 15:07  --------------------------------------------------------  IN: 100 mL / OUT: 0 mL / NET: 100 mL        PHYSICAL EXAM:  GENERAL: NAD, well-developed  HEAD:  Atraumatic, Normocephalic  EYES: EOMI, PERRLA, conjunctiva and sclera clear  NECK: Supple, No JVD  CHEST/LUNG: Clear to auscultation bilaterally; No wheeze  HEART: Regular rate and rhythm; No murmurs, rubs, or gallops  ABDOMEN: Soft, Nontender, Nondistended; Bowel sounds present  EXTREMITIES:  2+ Peripheral Pulses, No clubbing, cyanosis, or edema  PSYCH: AAOx3  NEUROLOGY: non-focal  SKIN: No rashes or lesions    LABS:                        9.5    9.13  )-----------( 212      ( 22 Jul 2023 11:57 )             32.1     07-22    137  |  102  |  78<H>  ----------------------------<  166<H>  4.4   |  22  |  2.63<H>    Ca    8.6      22 Jul 2023 11:57    TPro  7.0  /  Alb  2.6<L>  /  TBili  0.3  /  DBili  x   /  AST  27  /  ALT  36  /  AlkPhos  84  07-21          Urinalysis Basic - ( 22 Jul 2023 11:57 )    Color: x / Appearance: x / SG: x / pH: x  Gluc: 166 mg/dL / Ketone: x  / Bili: x / Urobili: x   Blood: x / Protein: x / Nitrite: x   Leuk Esterase: x / RBC: x / WBC x   Sq Epi: x / Non Sq Epi: x / Bacteria: x        RADIOLOGY & ADDITIONAL TESTS:    Imaging Personally Reviewed:    Consultant(s) Notes Reviewed:      Care Discussed with Consultants/Other Providers:

## 2023-07-22 NOTE — PROGRESS NOTE ADULT - ASSESSMENT
72 y/o female with multiple medical problems whom i see in my practice on outptn basis. last home visit marichuy 15   Ptn has h/o DM, HTN, breast CA, respiratory arrest and cardiac arrest (2018), CVA with residual weakness, h/o trache, PEG, both removed, now eating on her own but aspirates and has post prandial cough in addition to chronic productive cough. presumed 2/2 silent aspiration of her saliva this was d/w Dr. Jose Almendarez who is in agreement    Today ptn presents to the ED with  complaining of worsening cough and orthopnea. ptn has chronic LE edema.   Denies any headache, fever, chills, chest pain, abdominal pain, n/v/d, dysuria. Unable to obtain any history or ROS due to mental status.    A/P  7/12: Aspiration PNA, cannot R/O BRYAN. check sputum culture, acute on chronic diastolic chf, , chronic aspiration  ptn has multiple ABx allergies, start Cipro and flagyl which she tolerated in the past  cont outptn meds, get S&S eval  insulin on a sliding scale, RUSS, check bladder sono, renal sono  card, renal, pulm, endo ID called  cont outptn meds  7/13: ptn is awake, alert, daughter at bedside. daughter mentioned she [facility], wants me to cont seeing her on outptn at home and her but also wants to cont w [facility] on outptn. i explained to her she needs to pick a pcp, she cannot have me and [facility] in the hospital. she wants to stay with me, if her father changes his mind, she will let me know.   Ptn looks better today, more communicative, smiling, eating rice pudding, coughing after each bite. awaitng S&S, will need MBS. didnt tolerate FEEST in the past. ptn is hungry. she used to have a trache and a PEG post CVA, was removed few years ago. HTN is not controlled coreg raised to 37.5 bid. ptn seen by Renal, Scr at baseline, started on Farxiga and will start Losartan 25 mg    7/14: MBS done: recs are small soft bite food w mildly thickened liquids. renal, card, ID, pulm, endo input appreciated. Ptn is in isolation, being ruled out for active TB. 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 now on CEFEPIME, sputum for TB ordered. ptn is lethargic, BP improved, daughter feels its 2/2 CLONIDINE. as per renal start LOSARTAN. i started LOSARTAN yesterday, but it was DCed. lethargy could be due to overdiuresis. remains on LASIX 40 mg q12H. family wants the ptn to go home . awaiting ID clearance prior to DC. daughter c/o LE weakness and inability to ambulate, neuro called    7/15: ptn is on ABx for PNA, being worked up for TB, seen by Neuro. no immediate interventions planned. cont present meds    7/16:  ptn is lethargic, has a rash, daughter is concerned its a reaction to the ABx, nonpruritic. daughter is refusing to start LOSARTAN. discussed its renal protective in ptns w DM. green sputum resolved, now its clear-yellow    7/17: ptn has an allergic rash, will start ATARAX for it. cleared by ID to DC isolation, DC ABx, dc planning tomorrow daughter wants home,  wants rehab, will decide in am    7/18: ptn is lethargic, has a diffuse rash, has pruritis, looks allergic in nature, will place on steroids, derm called, ABx DCed 7/17. completed course of Abx. AFB sputum neg, AFB Cx pending. green thick sputum resolved, now clear to yellow. cleared by speech therapy to eat a dysphagia diet. sleep wake cycle is off. stressed to family importance of a regular sleep wake cycle. PT eval    7/19: s/p treatment for PNA, developed a rash subsequently, prob an allergic response to ABx. started solumedrol yesterday 3/3 severity of the rash and edema, today much better, seen by derm, has hyperglycemia 2/2 steroids, will adjust insulin. ptn is more awake today, ate well today    7/20: rash improving, eating well, dc home in am, arrange home O2. prednisone taper    7/21: ptn qualifies for home O2, will get 4 hrs of IVF. dc planning once home O2 gets delivered. rash is resolving    7/22: ptn feels well, dc planning today                        dvt ppx w [facility]

## 2023-07-22 NOTE — PROGRESS NOTE ADULT - PROBLEM SELECTOR PLAN 1
Will increase Lantus to 28 u at bedtime.  Will continue Admelog 9  u before each meal and continue Admelog correction scale coverage. Will continue monitoring FS and FU.  Patient counseled for compliance with consistent low carb diet and exercise as tolerated outpatient.    Pt was on mixed insulin and low dose januvia at home  Suggest to stop mix insulin  Suggest to DC on current basal insulin nightly with Home low dose Januvia  FU outpatient
Will continue Lantus to 12 u at bedtime.  Will continue Admelog 3  u before each meal and continue Admelog correction scale coverage. Will continue monitoring FS and FU.  Patient counseled for compliance with consistent low carb diet and exercise as tolerated outpatient.    Pt was on mixed insulin and low dose januvia at home
Will increase Lantus 34 u at bedtime.  Will increase Admelog 12 u before each meal and continue Admelog correction scale coverage. Will continue monitoring FS and FU.  Patient counseled for compliance with consistent low carb diet and exercise as tolerated outpatient.    Pt was on mixed insulin and low dose januvia at home  Suggest to stop mix insulin  Suggest to DC on current basal insulin nightly with Home low dose Januvia  Anticipate glucose to improve with steroid tapering down  FU outpatient for further outpt insulin adjustment jair when steroids being tapered
Will increase Lantus to 15 u at bedtime.  Will continue Admelog 6 u before each meal and continue Admelog correction scale coverage. Will continue monitoring FS and FU.  Patient counseled for compliance with consistent low carb diet and exercise as tolerated outpatient.
Will increase Lantus to 21 u at bedtime.  Will increase Admelog 8  u before each meal and continue Admelog correction scale coverage. Will continue monitoring FS and FU.  Patient counseled for compliance with consistent low carb diet and exercise as tolerated outpatient.
Multifactorial, likely aspiration PNA and acute diastolic heart failure   replete K > 4   follow up AM labs   IV abx   Mucinex  Duoneb nebulizers.
Will increase Lantus to 18 u at bedtime.  Will continue Admelog 7  u before each meal and continue Admelog correction scale coverage. Will continue monitoring FS and FU.  Patient counseled for compliance with consistent low carb diet and exercise as tolerated outpatient.
Will increase Lantus 30 u at bedtime.  Will increase Admelog 10  u before each meal and continue Admelog correction scale coverage. Will continue monitoring FS and FU.  Patient counseled for compliance with consistent low carb diet and exercise as tolerated outpatient.    Pt was on mixed insulin and low dose januvia at home  Suggest to stop mix insulin  Suggest to DC on current basal insulin nightly with Home low dose Januvia  Anticipate glucose to improve with steroid taper down  FU outpatient
Multifactorial, likely aspiration PNA and acute diastolic heart failure   IV abx - completed  nebs as ordered  tolerating 3L NC  management as per primary team
Multifactorial, likely aspiration PNA and acute diastolic heart failure   replete K > 4   follow up AM labs   IV abx   Mucinex  aspiration precautions.   Duoneb nebulizers.
Will decrease Lantus to 12 u at bedtime.  Will decrease Admelog 3  u before each meal and continue Admelog correction scale coverage. Will continue monitoring FS and FU.  Patient counseled for compliance with consistent low carb diet and exercise as tolerated outpatient.
Multifactorial, likely aspiration PNA and acute diastolic heart failure   IV abx - completed  nebs as ordered  titrate/wean supplemental O2 as needed  management as per primary team
Multifactorial, likely aspiration PNA and acute diastolic heart failure   IV abx - completed  nebs as ordered  tolerating 3L NC  management as per primary team
Multifactorial, likely aspiration PNA and acute diastolic heart failure   DC Lasix 40 IV bid   replete K > 4   follow up AM labs   IV abx   Mucinex  Duoneb nebulizers.
Will continue Lantus to 12 u at bedtime.  Will continue Admelog 3  u before each meal and continue Admelog correction scale coverage. Will continue monitoring FS and FU.  Patient counseled for compliance with consistent low carb diet and exercise as tolerated outpatient.    Pt was on mixed insulin and low dose januvia at home  Suggest to stop mix insulin  Suggest to DC on current basal insulin nightly with Home low dose Januvia  FU outpatient
Multifactorial, likely aspiration PNA and acute diastolic heart failure   Cont with Lasix 40 IV bid   replete K > 4   follow up AM labs   IV abx   Mucinex  Duoneb nebulizers.  add farxiga 10 daily

## 2023-07-22 NOTE — ED PROVIDER NOTE - CADM POA URETHRAL CATHETER
07/22/23 0737   Patient Assessment/Suction   Level of Consciousness (AVPU) responds to pain   Respiratory Effort Unlabored   Expansion/Accessory Muscles/Retractions expansion symmetric   All Lung Fields Breath Sounds clear;diminished   Rhythm/Pattern, Respiratory assisted mechanically   Cough Frequency infrequent;with stimulation   Cough Type assisted;productive   Suction Method oral;tracheal   $ Suction Charges Inline Suction Procedure Stat Charge;Close Suction System Equipment   Secretions Amount small   Secretions Color white   Skin Integrity   $ Wound Care Tech Time 15 min   Area Observed Left;Right;Cheek;Upper lip;Lower lip;Corner lip   Skin Appearance without discoloration   PRE-TX-O2   Device (Oxygen Therapy) ventilator   $ Is the patient on Low Flow Oxygen? Yes   Oxygen Concentration (%) 30   SpO2 (!) 94 %   Pulse Oximetry Type Continuous   $ Pulse Oximetry - Multiple Charge Pulse Oximetry - Multiple   Pulse 70   Resp 20        Airway - Non-Surgical 07/20/23 0945 Endotracheal Tube   Placement Date/Time: 07/20/23 0945   Method of Intubation: Hendricks  Inserted by: MD  Staff/Resident Name(s): dr charles  Airway Device: Endotracheal Tube  Airway Device Size: 8.0  Style: Cuffed  Cuff Inflated With: Air  Placement Verified By: Capnometr...   Secured at 24 cm   Measured At Lips   Secured Location Right   Secured by Commercial tube teran   Status Intact;Secured;Patent   General Safety Checklist   Safety Promotion/Fall Prevention side rails raised   Airway Safety   Is Ambu Bag and Mask with Patient? Yes, Adult Ambu Bag and Mask   Suction set is at the bedside? Yes   Vent Select   Conventional Vent Y   $ Ventilator Subsequent 1   Charged w/in last 24h YES   Preset Conventional Ventilator Settings   Vent ID [id number]   Vent Type    Ventilation Type VC   Vent Mode A/C   Humidity HME   Set Rate 20 BPM   Vt Set 450 mL   PEEP/CPAP 5 cmH20   Peak Flow 60 L/min   Peak End Inspiratory Pressure 23 cmH20   I-Trigger Type   "V-TRIG   Trigger Sensitivity Flow/I-Trigger 3 L/min   Patient Ventilator Parameters   Resp Rate Total 20 br/min   Peak Airway Pressure 27 cmH20   Mean Airway Pressure 11 cmH20   Plateau Pressure 0 cmH20   Exhaled Vt 444 mL   Total Ve 8.88 L/m   I:E Ratio Measured 1:2.70   Auto PEEP 0 cmH20   Conventional Ventilator Alarms   Alarms On Y   Ve High Alarm 20 L/min   Ve Low Alarm 5 L/min   Vt High Alarm 1200 mL   Vt Low Alarm 200 mL   Resp Rate High Alarm 40 br/min   Press High Alarm 55 cmH2O   Apnea Rate 20   Apnea Volume (mL) 0 mL   Apnea Oxygen Concentration  100   Apnea Flow Rate (L/min) 60   T Apnea 20 sec(s)   IHI Ventilator Associated Pneumonia Bundle (Required)   Head of Bed Elevated  HOB 30   Oral Care Mouth swabbed;Mouth suctioned   Ready to Wean/Extubation Screen   FIO2<=50 (chart decimal) 0.3   MV<16L (chart vol.) 8.88   PEEP <=8 (chart #) 5   Ready to Wean Parameters   F/VT Ratio<105 (RSBI) (!) 45.05   Vital Capacity   Vital Capacity (mL) 0   Education   $ Education Suction;Ventilator Oxygen;15 min   IBW/VT Calculations   Height 5' 7" (1.702 m)   IBW/kg (Calculated) Female 61.6 kg   Low Range Vt 4cc/kg FEMALE 246.4 mL   Low Range Vt 6cc/kg FEMALE 369.6 mL   Adult Moderate Range vt 8cc/kg FEMALE 492.8 mL   Adult High Range Vt 10cc/kg FEMALE 616 mL   Current VT Set/IBW (Calculated) FEMALE 7.31 ML\Kg     " No

## 2023-07-22 NOTE — PROGRESS NOTE ADULT - ASSESSMENT
on nasal cannula resting in bed  afebrile  no complaints    acetaminophen     Tablet .. 650 milliGRAM(s) Oral every 6 hours PRN  albuterol/ipratropium for Nebulization 3 milliLiter(s) Nebulizer every 6 hours  aspirin enteric coated 81 milliGRAM(s) Oral daily  atorvastatin 10 milliGRAM(s) Oral at bedtime  buDESOnide    Inhalation Suspension 0.5 milliGRAM(s) Inhalation every 12 hours  carvedilol 37.5 milliGRAM(s) Oral every 12 hours  chlorhexidine 2% Cloths 1 Application(s) Topical <User Schedule>  cholecalciferol 2000 Unit(s) Oral daily  clobetasol 0.05% Ointment 1 Application(s) Topical two times a day  cloNIDine 0.1 milliGRAM(s) Oral two times a day  dextrose 5%. 1000 milliLiter(s) IV Continuous <Continuous>  dextrose 5%. 1000 milliLiter(s) IV Continuous <Continuous>  dextrose 50% Injectable 25 Gram(s) IV Push once  dextrose 50% Injectable 12.5 Gram(s) IV Push once  dextrose 50% Injectable 25 Gram(s) IV Push once  dextrose Oral Gel 15 Gram(s) Oral once PRN  donepezil 10 milliGRAM(s) Oral at bedtime  doxazosin 8 milliGRAM(s) Oral at bedtime  ferrous    sulfate 325 milliGRAM(s) Oral daily  fluticasone propionate 50 MICROgram(s)/spray Nasal Spray 1 Spray(s) Both Nostrils two times a day  glucagon  Injectable 1 milliGRAM(s) IntraMuscular once  guaiFENesin Oral Liquid (Sugar-Free) 100 milliGRAM(s) Oral every 6 hours PRN  hydrOXYzine hydrochloride 25 milliGRAM(s) Oral every 6 hours PRN  insulin glargine Injectable (LANTUS) 34 Unit(s) SubCutaneous at bedtime  insulin lispro (ADMELOG) corrective regimen sliding scale   SubCutaneous three times a day before meals  insulin lispro (ADMELOG) corrective regimen sliding scale   SubCutaneous at bedtime  insulin lispro Injectable (ADMELOG) 12 Unit(s) SubCutaneous three times a day before meals  letrozole 2.5 milliGRAM(s) Oral daily  Nephro-stacia 1 Tablet(s) Oral daily  polyethylene glycol 3350 17 Gram(s) Oral two times a day  predniSONE   Tablet   Oral   sodium chloride 0.9% for Nebulization 3 milliLiter(s) Nebulizer every 6 hours  sodium chloride 0.9%. 1000 milliLiter(s) IV Continuous <Continuous>  sodium chloride 0.9%. 1000 milliLiter(s) IV Continuous <Continuous>      VITAL:  T(C): , Max: 36.8 (07-22-23 @ 05:00)  T(F): , Max: 98.2 (07-22-23 @ 05:00)  HR: 65 (07-22-23 @ 11:30)  BP: 165/68 (07-22-23 @ 11:30)  BP(mean): --  RR: 18 (07-22-23 @ 11:30)  SpO2: 96% (07-22-23 @ 11:30)  Wt(kg): --    07-21-23 @ 07:01  -  07-22-23 @ 07:00  --------------------------------------------------------  IN: 1150 mL / OUT: 100 mL / NET: 1050 mL        PHYSICAL EXAM:  Constitutional: alert, NAD  HEENT: NCAT, DMM  Neck: Supple, No JVD  Respiratory: CTA-b/l  Cardiovascular: RRR s1s2, no m/r/g  Gastrointestinal: BS+, soft, NT/ND  Extremities: No peripheral edema b/l  Neurological: no focal deficits; strength grossly intact  Back: no CVAT b/l    LABS:                          9.5    9.13  )-----------( 212 ( 22 Jul 2023 11:57 )             32.1     Na(137)/K(4.4)/Cl(102)/HCO3(22)/BUN(78)/Cr(2.63)Glu(166)/Ca(8.6)/Mg(--)/PO4(--)    07-22 @ 11:57  Na(135)/K(4.4)/Cl(100)/HCO3(23)/BUN(75)/Cr(2.87)Glu(295)/Ca(8.5)/Mg(--)/PO4(--)    07-21 @ 13:23  Na(140)/K(4.5)/Cl(103)/HCO3(26)/BUN(63)/Cr(2.92)Glu(238)/Ca(8.8)/Mg(--)/PO4(--)    07-20 @ 10:00    Urinalysis Basic - ( 22 Jul 2023 11:57 )    Color: x / Appearance: x / SG: x / pH: x  Gluc: 166 mg/dL / Ketone: x  / Bili: x / Urobili: x   Blood: x / Protein: x / Nitrite: x   Leuk Esterase: x / RBC: x / WBC x   Sq Epi: x / Non Sq Epi: x / Bacteria: x      Sodium, Random Urine: 13 mmol/L (07-21 @ 13:34)  Potassium, Random Urine: 39 mmol/L (07-21 @ 13:34)  Osmolality, Random Urine: 494 mos/kg (07-21 @ 13:34)  Creatinine, Random Urine: 108 mg/dL (07-21 @ 13:34)        ASSESSMENT/PLAN  IMPRESSION: 73F w/ HTN, DM2, CVA, breast CA-b/l mastectomy, and CAC-trach/reversal, 7/12/23 p/w cough/sob    (1)CKD - stage 4 with nephrotic-range proteinuria - highly likely from diabetic nephropathy.    (2)RUSS - prerenal - improving now. Multifactorial from limited intake and insensible losses from rash    (3)Electrolytes- controlled    (4)Anemia- hgb improving    (4)CV - stage 2 hypertension - sup optimally controlled now, on Coreg, Clonidine, and Cardura. , repeat pending    (5)Pulm - aspiration pneumonia/pneumonitis; s/p abx course (Cefepime==>Zosyn)    (6)Rash - likely abx-induced; improving; Derm on board      RECOMMEND:  (1)Antihypertensives as ordered  (2)No objection to discharge  if no further complications ensue; repeat BMP next week; f/u with outside nephrologist Dr. Neely in 1-2 weeks      Nas Corona, NP-BC  [facility]  [phone]

## 2023-07-22 NOTE — PROGRESS NOTE ADULT - SUBJECTIVE AND OBJECTIVE BOX
Chief complaint  Patient is a 73y old  Female who presents with a chief complaint of respiratory distress (22 Jul 2023 07:11)         Labs and Fingersticks  CAPILLARY BLOOD GLUCOSE      POCT Blood Glucose.: 211 mg/dL (22 Jul 2023 07:57)  POCT Blood Glucose.: 165 mg/dL (21 Jul 2023 21:04)  POCT Blood Glucose.: 278 mg/dL (21 Jul 2023 16:03)  POCT Blood Glucose.: 279 mg/dL (21 Jul 2023 13:11)  POCT Blood Glucose.: 236 mg/dL (21 Jul 2023 11:46)      Anion Gap: 12 (07-21 @ 13:23)  Anion Gap: 11 (07-20 @ 10:00)      Calcium: 8.5 (07-21 @ 13:23)  Calcium: 8.8 (07-20 @ 10:00)  Albumin: 2.6 *L* (07-21 @ 13:23)  Albumin: 2.4 *L* (07-20 @ 10:00)    Alanine Aminotransferase (ALT/SGPT): 36 (07-21 @ 13:23)  Alanine Aminotransferase (ALT/SGPT): 18 (07-20 @ 10:00)  Alkaline Phosphatase: 84 (07-21 @ 13:23)  Alkaline Phosphatase: 82 (07-20 @ 10:00)  Aspartate Aminotransferase (AST/SGOT): 27 (07-21 @ 13:23)  Aspartate Aminotransferase (AST/SGOT): 18 (07-20 @ 10:00)        07-21    135  |  100  |  75<H>  ----------------------------<  295<H>  4.4   |  23  |  2.87<H>    Ca    8.5      21 Jul 2023 13:23    TPro  7.0  /  Alb  2.6<L>  /  TBili  0.3  /  DBili  x   /  AST  27  /  ALT  36  /  AlkPhos  84  07-21                        9.0    9.05  )-----------( 187      ( 21 Jul 2023 13:23 )             30.4     Medications  MEDICATIONS  (STANDING):  albuterol/ipratropium for Nebulization 3 milliLiter(s) Nebulizer every 6 hours  aspirin enteric coated 81 milliGRAM(s) Oral daily  atorvastatin 10 milliGRAM(s) Oral at bedtime  buDESOnide    Inhalation Suspension 0.5 milliGRAM(s) Inhalation every 12 hours  carvedilol 37.5 milliGRAM(s) Oral every 12 hours  chlorhexidine 2% Cloths 1 Application(s) Topical <User Schedule>  cholecalciferol 2000 Unit(s) Oral daily  clobetasol 0.05% Ointment 1 Application(s) Topical two times a day  cloNIDine 0.1 milliGRAM(s) Oral two times a day  dextrose 5%. 1000 milliLiter(s) (100 mL/Hr) IV Continuous <Continuous>  dextrose 5%. 1000 milliLiter(s) (50 mL/Hr) IV Continuous <Continuous>  dextrose 50% Injectable 25 Gram(s) IV Push once  dextrose 50% Injectable 12.5 Gram(s) IV Push once  dextrose 50% Injectable 25 Gram(s) IV Push once  donepezil 10 milliGRAM(s) Oral at bedtime  doxazosin 8 milliGRAM(s) Oral at bedtime  ferrous    sulfate 325 milliGRAM(s) Oral daily  fluticasone propionate 50 MICROgram(s)/spray Nasal Spray 1 Spray(s) Both Nostrils two times a day  glucagon  Injectable 1 milliGRAM(s) IntraMuscular once  insulin glargine Injectable (LANTUS) 34 Unit(s) SubCutaneous at bedtime  insulin lispro (ADMELOG) corrective regimen sliding scale   SubCutaneous three times a day before meals  insulin lispro (ADMELOG) corrective regimen sliding scale   SubCutaneous at bedtime  insulin lispro Injectable (ADMELOG) 12 Unit(s) SubCutaneous three times a day before meals  letrozole 2.5 milliGRAM(s) Oral daily  Nephro-stacia 1 Tablet(s) Oral daily  polyethylene glycol 3350 17 Gram(s) Oral two times a day  predniSONE   Tablet   Oral   sodium chloride 0.9% for Nebulization 3 milliLiter(s) Nebulizer every 6 hours  sodium chloride 0.9%. 1000 milliLiter(s) (100 mL/Hr) IV Continuous <Continuous>  sodium chloride 0.9%. 1000 milliLiter(s) (250 mL/Hr) IV Continuous <Continuous>      Physical Exam  General: Patient comfortable in bed   Vital Signs Last 12 Hrs  T(F): 98.2 (07-22-23 @ 05:00), Max: 98.2 (07-22-23 @ 05:00)  HR: 68 (07-22-23 @ 05:00) (60 - 68)  BP: 162/63 (07-22-23 @ 05:00) (162/63 - 189/70)  BP(mean): --  RR: 18 (07-22-23 @ 05:00) (18 - 18)  SpO2: 97% (07-22-23 @ 05:00) (97% - 99%)    CVS: S1S2   Respiratory: No wheezing, no crepitations  GI: Abdomen soft, bowel sounds positive  Musculoskeletal:  moves all extremities  : Voiding

## 2023-07-22 NOTE — PROGRESS NOTE ADULT - SUBJECTIVE AND OBJECTIVE BOX
Subjective: Patient seen and examined. No new events except as noted.   Daughter at bedside.  Pt doing well.  They are hoping for d/c today.     REVIEW OF SYSTEMS:    CONSTITUTIONAL: + weakness, fevers or chills  EYES/ENT: No visual changes;  No vertigo or throat pain   NECK: No pain or stiffness  RESPIRATORY: No cough, wheezing, hemoptysis; No shortness of breath  CARDIOVASCULAR: No chest pain or palpitations  GASTROINTESTINAL: No abdominal or epigastric pain. No nausea, vomiting, or hematemesis; No diarrhea or constipation. No melena or hematochezia.  GENITOURINARY: No dysuria, frequency or hematuria  NEUROLOGICAL: No numbness or weakness  SKIN: +itching, burning, rashes, or lesions   All other review of systems is negative unless indicated above.    MEDICATIONS:  MEDICATIONS  (STANDING):  albuterol/ipratropium for Nebulization 3 milliLiter(s) Nebulizer every 6 hours  aspirin enteric coated 81 milliGRAM(s) Oral daily  atorvastatin 10 milliGRAM(s) Oral at bedtime  buDESOnide    Inhalation Suspension 0.5 milliGRAM(s) Inhalation every 12 hours  carvedilol 37.5 milliGRAM(s) Oral every 12 hours  chlorhexidine 2% Cloths 1 Application(s) Topical <User Schedule>  cholecalciferol 2000 Unit(s) Oral daily  clobetasol 0.05% Ointment 1 Application(s) Topical two times a day  cloNIDine 0.1 milliGRAM(s) Oral two times a day  dextrose 5%. 1000 milliLiter(s) (100 mL/Hr) IV Continuous <Continuous>  dextrose 5%. 1000 milliLiter(s) (50 mL/Hr) IV Continuous <Continuous>  dextrose 50% Injectable 25 Gram(s) IV Push once  dextrose 50% Injectable 12.5 Gram(s) IV Push once  dextrose 50% Injectable 25 Gram(s) IV Push once  donepezil 10 milliGRAM(s) Oral at bedtime  doxazosin 8 milliGRAM(s) Oral at bedtime  ferrous    sulfate 325 milliGRAM(s) Oral daily  fluticasone propionate 50 MICROgram(s)/spray Nasal Spray 1 Spray(s) Both Nostrils two times a day  glucagon  Injectable 1 milliGRAM(s) IntraMuscular once  insulin glargine Injectable (LANTUS) 30 Unit(s) SubCutaneous at bedtime  insulin lispro (ADMELOG) corrective regimen sliding scale   SubCutaneous at bedtime  insulin lispro (ADMELOG) corrective regimen sliding scale   SubCutaneous three times a day before meals  insulin lispro Injectable (ADMELOG) 10 Unit(s) SubCutaneous three times a day before meals  letrozole 2.5 milliGRAM(s) Oral daily  Nephro-stacia 1 Tablet(s) Oral daily  polyethylene glycol 3350 17 Gram(s) Oral two times a day  predniSONE   Tablet   Oral   sodium chloride 0.9% for Nebulization 3 milliLiter(s) Nebulizer every 6 hours  sodium chloride 0.9%. 1000 milliLiter(s) (100 mL/Hr) IV Continuous <Continuous>    PHYSICAL EXAM:  Vital Signs Last 24 Hrs  T(C): 36.8 (22 Jul 2023 05:00), Max: 36.8 (22 Jul 2023 05:00)  T(F): 98.2 (22 Jul 2023 05:00), Max: 98.2 (22 Jul 2023 05:00)  HR: 68 (22 Jul 2023 05:00) (60 - 68)  BP: 162/63 (22 Jul 2023 05:00) (162/63 - 189/79)  BP(mean): --  RR: 18 (22 Jul 2023 05:00) (18 - 19)  SpO2: 97% (22 Jul 2023 05:00) (85% - 99%)    Parameters below as of 22 Jul 2023 05:00  Patient On (Oxygen Delivery Method): nasal cannula  O2 Flow (L/min): 1    I&O's Summary    21 Jul 2023 07:01  -  22 Jul 2023 07:00  --------------------------------------------------------  IN: 1150 mL / OUT: 100 mL / NET: 1050 mL    Appearance: NAD  HEENT: Dry oral mucosa, PERRL, EOMI	  Lymphatic: No lymphadenopathy , no edema  Cardiovascular: Normal S1 S2, No JVD, No murmurs , Peripheral pulses palpable 2+ bilaterally  Respiratory: decreased bs 	  Gastrointestinal:  Soft, Non-tender, + BS	  Skin: generalized rash  Musculoskeletal: Normal range of motion, normal strength  Psychiatry: Mood & affect appropriate  Ext: No edema    LABS:    CARDIAC MARKERS:                        9.0    9.05  )-----------( 187      ( 21 Jul 2023 13:23 )             30.4     07-21    135  |  100  |  75<H>  ----------------------------<  295<H>  4.4   |  23  |  2.87<H>    Ca    8.5      21 Jul 2023 13:23    TPro  7.0  /  Alb  2.6<L>  /  TBili  0.3  /  DBili  x   /  AST  27  /  ALT  36  /  AlkPhos  84  07-21    proBNP:   Lipid Profile:   HgA1c:   TSH:     TELEMETRY: SR    ECG:  	  RADIOLOGY:   DIAGNOSTIC TESTING:  [ ] Echocardiogram:  [ ]  Catheterization:  [ ] Stress Test:    OTHER:

## 2023-07-22 NOTE — PROGRESS NOTE ADULT - PROBLEM SELECTOR PLAN 4
stable for now  continue with meds  continue to monitor continue with meds    - increase clonidine to 0.2mg   continue to monitor

## 2023-07-22 NOTE — PROGRESS NOTE ADULT - ASSESSMENT
74 y/o female with multiple medical problems whom i see in my practice on outptn basis. last home visit marichuy 15   Ptn has h/o DM, HTN, breast CA, respiratory arrest and cardiac arrest (2018), CVA with residual weakness, h/o trache, PEG, both removed, now eating on her own but aspirates and has post prandial cough in addition to chronic productive cough. presumed 2/2 silent aspiration of her saliva      pt  presents to the ED with  complaining of worsening cough and orthopnea. ptn has chronic LE edema.       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 2 other specimens in lab - f/u results :  -she is lethargic: check ABG: : no sedatives:   -ABG with chr compensated hypercarbic resp failure:  no need for bipap:  not the reason fr ams: OR SLEEPINESS  -she is much m ore alert and awake today  :  -no new complaints:   -jewell me she looks much better to-day  : o n oxygen : will need home oxygen   -no change in her status:  in fact todsay on 22nd she is much more alert and awake and responding to questions  RASH:   -new rash  :  -on steroids cream :   - cefepime changed to zosyn: id following  antibiotics to be finished tomorrow   -completed course now  acute on chronic CHF  -echo noted:   -defer to primary cards team   RUSS   -she likely has ac on chr CKD:  -she always had high creat before:   HTN  -her blood pressure is slightly had:   -cont to optimize anti hypertensives:   DM  monitor and control :    dvt prophylaxis : for dc planning :she could not be weaned off : needs home oxygen

## 2023-07-22 NOTE — PROGRESS NOTE ADULT - REASON FOR ADMISSION
respiratory distress

## 2023-07-22 NOTE — PROGRESS NOTE ADULT - SUBJECTIVE AND OBJECTIVE BOX
Date of Service: 07-22-23 @ 12:47    Patient is a 73y old  Female who presents with a chief complaint of respiratory distress (22 Jul 2023 09:21)      Any change in ROS: Doing ok : no sob:  no cu gh:     MEDICATIONS  (STANDING):  albuterol/ipratropium for Nebulization 3 milliLiter(s) Nebulizer every 6 hours  aspirin enteric coated 81 milliGRAM(s) Oral daily  atorvastatin 10 milliGRAM(s) Oral at bedtime  buDESOnide    Inhalation Suspension 0.5 milliGRAM(s) Inhalation every 12 hours  carvedilol 37.5 milliGRAM(s) Oral every 12 hours  chlorhexidine 2% Cloths 1 Application(s) Topical <User Schedule>  cholecalciferol 2000 Unit(s) Oral daily  clobetasol 0.05% Ointment 1 Application(s) Topical two times a day  cloNIDine 0.1 milliGRAM(s) Oral two times a day  dextrose 5%. 1000 milliLiter(s) (100 mL/Hr) IV Continuous <Continuous>  dextrose 5%. 1000 milliLiter(s) (50 mL/Hr) IV Continuous <Continuous>  dextrose 50% Injectable 25 Gram(s) IV Push once  dextrose 50% Injectable 12.5 Gram(s) IV Push once  dextrose 50% Injectable 25 Gram(s) IV Push once  donepezil 10 milliGRAM(s) Oral at bedtime  doxazosin 8 milliGRAM(s) Oral at bedtime  ferrous    sulfate 325 milliGRAM(s) Oral daily  fluticasone propionate 50 MICROgram(s)/spray Nasal Spray 1 Spray(s) Both Nostrils two times a day  glucagon  Injectable 1 milliGRAM(s) IntraMuscular once  insulin glargine Injectable (LANTUS) 34 Unit(s) SubCutaneous at bedtime  insulin lispro (ADMELOG) corrective regimen sliding scale   SubCutaneous three times a day before meals  insulin lispro (ADMELOG) corrective regimen sliding scale   SubCutaneous at bedtime  insulin lispro Injectable (ADMELOG) 12 Unit(s) SubCutaneous three times a day before meals  letrozole 2.5 milliGRAM(s) Oral daily  Nephro-stacia 1 Tablet(s) Oral daily  polyethylene glycol 3350 17 Gram(s) Oral two times a day  predniSONE   Tablet   Oral   sodium chloride 0.9% for Nebulization 3 milliLiter(s) Nebulizer every 6 hours  sodium chloride 0.9%. 1000 milliLiter(s) (100 mL/Hr) IV Continuous <Continuous>  sodium chloride 0.9%. 1000 milliLiter(s) (250 mL/Hr) IV Continuous <Continuous>    MEDICATIONS  (PRN):  acetaminophen     Tablet .. 650 milliGRAM(s) Oral every 6 hours PRN Temp greater or equal to 38C (100.4F), Mild Pain (1 - 3), Moderate Pain (4 - 6)  dextrose Oral Gel 15 Gram(s) Oral once PRN Blood Glucose LESS THAN 70 milliGRAM(s)/deciliter  guaiFENesin Oral Liquid (Sugar-Free) 100 milliGRAM(s) Oral every 6 hours PRN Cough  hydrOXYzine hydrochloride 25 milliGRAM(s) Oral every 6 hours PRN Itching    Vital Signs Last 24 Hrs  T(C): 36.3 (22 Jul 2023 11:30), Max: 36.8 (22 Jul 2023 05:00)  T(F): 97.3 (22 Jul 2023 11:30), Max: 98.2 (22 Jul 2023 05:00)  HR: 65 (22 Jul 2023 11:30) (60 - 68)  BP: 165/68 (22 Jul 2023 11:30) (162/63 - 189/79)  BP(mean): --  RR: 18 (22 Jul 2023 11:30) (18 - 18)  SpO2: 96% (22 Jul 2023 11:30) (94% - 99%)    Parameters below as of 22 Jul 2023 11:30  Patient On (Oxygen Delivery Method): nasal cannula  O2 Flow (L/min): 1      I&O's Summary    21 Jul 2023 07:01  -  22 Jul 2023 07:00  --------------------------------------------------------  IN: 1150 mL / OUT: 100 mL / NET: 1050 mL          Physical Exam:   GENERAL: NAD, well-groomed, well-developed  HEENT: MANDY/   Atraumatic, Normocephalic  ENMT: No tonsillar erythema, exudates, or enlargement; Moist mucous membranes, Good dentition, No lesions  NECK: Supple, No JVD, Normal thyroid  CHEST/LUNG: Clear to auscultaion  CVS: Regular rate and rhythm; No murmurs, rubs, or gallops  GI: : Soft, Nontender, Nondistended; Bowel sounds present  NERVOUS SYSTEM:  Alert & Oriented X3  EXTREMITIES:  - edema  LYMPH: No lymphadenopathy noted  SKIN: No rashes or lesions  ENDOCRINOLOGY: No Thyromegaly  PSYCH: Appropriate    Labs:                              9.5    9.13  )-----------( 212      ( 22 Jul 2023 11:57 )             32.1                         9.0    9.05  )-----------( 187      ( 21 Jul 2023 13:23 )             30.4                         9.3    11.95 )-----------( 211      ( 20 Jul 2023 09:59 )             31.4     07-22    137  |  102  |  78<H>  ----------------------------<  166<H>  4.4   |  22  |  2.63<H>  07-21    135  |  100  |  75<H>  ----------------------------<  295<H>  4.4   |  23  |  2.87<H>  07-20    140  |  103  |  63<H>  ----------------------------<  238<H>  4.5   |  26  |  2.92<H>    Ca    8.6      22 Jul 2023 11:57  Ca    8.5      21 Jul 2023 13:23    TPro  7.0  /  Alb  2.6<L>  /  TBili  0.3  /  DBili  x   /  AST  27  /  ALT  36  /  AlkPhos  84  07-21  TPro  6.9  /  Alb  2.4<L>  /  TBili  0.2  /  DBili  x   /  AST  18  /  ALT  18  /  AlkPhos  82  07-20    CAPILLARY BLOOD GLUCOSE      POCT Blood Glucose.: 212 mg/dL (22 Jul 2023 12:01)  POCT Blood Glucose.: 211 mg/dL (22 Jul 2023 07:57)  POCT Blood Glucose.: 165 mg/dL (21 Jul 2023 21:04)  POCT Blood Glucose.: 278 mg/dL (21 Jul 2023 16:03)  POCT Blood Glucose.: 279 mg/dL (21 Jul 2023 13:11)      LIVER FUNCTIONS - ( 21 Jul 2023 13:23 )  Alb: 2.6 g/dL / Pro: 7.0 g/dL / ALK PHOS: 84 U/L / ALT: 36 U/L / AST: 27 U/L / GGT: x             Urinalysis Basic - ( 22 Jul 2023 11:57 )    Color: x / Appearance: x / SG: x / pH: x  Gluc: 166 mg/dL / Ketone: x  / Bili: x / Urobili: x   Blood: x / Protein: x / Nitrite: x   Leuk Esterase: x / RBC: x / WBC x   Sq Epi: x / Non Sq Epi: x / Bacteria: x            RECENT CULTURES:  07-16 @ 12:01 .Sputum Sputum   rad< from: CT Head No Cont (07.17.23 @ 22:44) >  COMPARISON: Prior brain MRI study from 2/6/2018. Prior CT study of the   head from 2/3/2018.    FINDINGS: Encephalomalacia and gliosis is seen within the left occipital   lobe is chronic infarction.    There is no acute intracranial hemorrhage, mass effect, shift of the   midline structures, herniation, hydrocephalus, or abnormal extra axial   fluid collection.    There is diffuse cerebral volume loss with prominence of the sulci,   fissures, and cisternal spaces which is normal for the patient's age.   There is mild periventricular white matter hypoattenuation statistically   compatible with microvascular type changes given calcific atherosclerotic   disease of the intracranial arteries.    The paranasal sinuses and right mastoid cavity is clear. There is under   pneumatization and sclerosis of the left hip in the mastoid cavity. Soft   tissue is seen within the left middle ear.    There is evidenceof bilateral cataract removal.    Multiple rounded lucencies in the skull are nonspecific.    IMPRESSION: No acute intracranial findings.    Chronic left occipital lobe infarct an similar-appearing mild chronic   white matter microvascular type changes.    --- End of Report ---          < end of copied text >  < from: CT Chest No Cont (07.12.23 @ 17:11) >  partially atrophic.    BONES/SOFT TISSUES: Bilateral mastectomies. Cardiac loop recorder.   Chronic L1 compression deformity, unchanged since December 2021 abdominal   CT. Old sternal and bilateral rib fractures may be related to prior   cardiopulmonary resuscitation. Otherwise no aggressive osseous lesion.    IMPRESSION:  Upper lung predominant bilateral multifocal consolidation with branching   tubular opacities, suggestive of endobronchial spread of infection.   Interval right apical thick-walled cyst since 2019. TB should be   considered in the appropriate clinical setting, among other infectious   etiologies.    Small right pleural effusion.        --- End of Report ---    < end of copied text >               Culture is being performed.          RESPIRATORY CULTURES:          Studies  Chest X-RAY  CT SCAN Chest   Venous Dopplers: LE:   CT Abdomen  Others

## 2023-07-24 NOTE — PHYSICAL EXAM
[No Acute Distress] : no acute distress [Normal Sclera/Conjunctiva] : normal sclera/conjunctiva [Normal Outer Ear/Nose] : the outer ears and nose were normal in appearance [No Respiratory Distress] : no respiratory distress

## 2023-07-24 NOTE — REVIEW OF SYSTEMS
[Lower Ext Edema] : lower extremity edema [Fever] : no fever [Chills] : no chills [Chest Pain] : no chest pain [Shortness Of Breath] : no shortness of breath [Abdominal Pain] : no abdominal pain [Headache] : no headache

## 2023-07-24 NOTE — PLAN
[FreeTextEntry1] : Case d/w Renal/Dr. DELANEY Colon via Teams and stated that pt's Lasix was dc'd secondary to worsening renal function and if repeat labs improved that she could possibly be restarted Spoke with pt's daughter and  and they have placed a call to PCP and will have PCP arrange home lab draw  Instructed to call back for any problems or go to the ER for any worsening or new symptoms.

## 2023-07-24 NOTE — ASSESSMENT
[FreeTextEntry1] : Records from hospital stay reviewed and pt with noted worsening renal insufficiency. Will contact pt's nephrologist/Dr. Jimmy Colon

## 2023-07-24 NOTE — HISTORY OF PRESENT ILLNESS
[Home] : at home, [unfilled] , at the time of the visit. [Other Location: e.g. Home (Enter Location, City,State)___] : at [unfilled] [Verbal consent obtained from patient] : the patient, [unfilled] [Spouse] : spouse [Family Member] : family member [FreeTextEntry8] : 74 yo F s/p CVA d/c from [facility] on 7/22  for CHF exacerbation.  Lasix was stopped on d/c because of worsening renal function and now family c/o increasing swelling of legs and upper extremities.  Family denies any assoc CP or SOB

## 2023-07-27 PROBLEM — J69.0 PNEUMONITIS DUE TO INHALATION OF FOOD AND VOMIT: Status: ACTIVE | Noted: 2023-01-01

## 2023-07-27 PROBLEM — J96.12 CHRONIC RESPIRATORY FAILURE WITH HYPERCAPNIA: Status: ACTIVE | Noted: 2023-01-01

## 2023-07-27 PROBLEM — R60.9 PERIPHERAL EDEMA: Status: ACTIVE | Noted: 2023-01-01

## 2023-07-27 PROBLEM — S90.821A: Status: ACTIVE | Noted: 2023-01-01

## 2023-07-27 PROBLEM — I50.9 HEART FAILURE, UNSPECIFIED: Status: ACTIVE | Noted: 2023-01-01

## 2023-07-27 PROBLEM — A31.0 PULMONARY MYCOBACTERIAL INFECTION: Status: ACTIVE | Noted: 2023-01-01

## 2023-07-27 NOTE — PLAN
[FreeTextEntry1] : fall precautions\par follow up with PCP\par follow up with ENDO\par follow up with CARD appt on Monday as per daughter\par follow up with Renal\par follow up with PULM\par follow up with lab results from today's draw.\par Daughter will make appts for patient, states they will be virtual

## 2023-07-27 NOTE — COUNSELING
[Fall prevention counseling provided] : Fall prevention counseling provided [Adequate lighting] : Adequate lighting [No throw rugs] : No throw rugs [Use proper foot wear] : Use proper foot wear [Use recommended devices] : Use recommended devices [FreeTextEntry1] : pt has w/c lucrecia lift, commode. Assisted with transfer by family

## 2023-07-27 NOTE — PHYSICAL EXAM
[No Acute Distress] : no acute distress [Well Developed] : well developed [Normal Rate] : normal rate  [Regular Rhythm] : with a regular rhythm [Soft] : abdomen soft [Non Tender] : non-tender [Non-distended] : non-distended [Normal Bowel Sounds] : normal bowel sounds [Kyphosis] : kyphosis [Alert and Oriented x3] : oriented to person, place, and time [Normal Mood] : the mood was normal [Normal Outer Ear/Nose] : the outer ears and nose were normal in appearance [No Respiratory Distress] : no respiratory distress  [No Accessory Muscle Use] : no accessory muscle use [No Varicosities] : no varicosities [No Extremity Clubbing/Cyanosis] : no extremity clubbing/cyanosis [de-identified] : soft voice, she is drowsy but arousable responds to questions. Pt requires assistance with sitting up in bed [de-identified] : grossly CTA with poor effort intermittent cough non productive 95% room air [de-identified] : bilateral mastectomy [de-identified] : BLE edema grade 2+ diminished pedal pulses  [de-identified] : blister to right heel, skin intact. BLE macular rash  [de-identified] : tremors to neck and upper extremities right lower extremity  weakness and left upper extremity  [de-identified] : a

## 2023-07-27 NOTE — HEALTH RISK ASSESSMENT
[No] : No [Yes] : In the past 12 months have you used drugs other than those required for medical reasons? Yes [No falls in past year] : Patient reported no falls in the past year [Patient not ambulatory] : Patient is not ambulatory [Assistive Device] : Patient uses an assistive device [Designated Healthcare Proxy] : Designated healthcare proxy [Name: ___] : Health Care Proxy's Name: [unfilled]  [Relationship: ___] : Relationship: [unfilled] [Aggressive treatment] : aggressive treatment [I will adhere to the patient's wishes.] : I will adhere to the patient's wishes. [Time Spent: ___ minutes] : Time Spent: [unfilled] minutes [Never] : Never [de-identified] : limited [de-identified] : low salt and sugar [de-identified] : wheelchair, lucrecia lift bed rails [AdvancecareDate] : 07/23

## 2023-07-27 NOTE — REVIEW OF SYSTEMS
[Fatigue] : fatigue [Lower Ext Edema] : lower extremity edema [Shortness Of Breath] : shortness of breath [Cough] : cough [Incontinence] : incontinence [Joint Pain] : joint pain [Muscle Weakness] : muscle weakness [Memory Loss] : memory loss [Unsteady Walking] : ataxia [Negative] : Heme/Lymph [Fever] : no fever [Night Sweats] : no night sweats [Earache] : no earache [Hearing Loss] : no hearing loss [Hoarseness] : no hoarseness [Nasal Discharge] : no nasal discharge [Sore Throat] : no sore throat [Chest Pain] : no chest pain [Palpitations] : no palpitations [Orthopnea] : no orthopnea [Wheezing] : no wheezing [Dyspnea on Exertion] : no dyspnea on exertion [Abdominal Pain] : no abdominal pain [Nausea] : no nausea [Constipation] : no constipation [Diarrhea] : diarrhea [Heartburn] : no heartburn [Vomiting] : no vomiting [Melena] : no melena [Dysuria] : no dysuria [Hematuria] : no hematuria [Back Pain] : no back pain [Headache] : no headache [Fainting] : no fainting [FreeTextEntry6] : daughter and  state they are trying to wean her off oxygen, she coughs sometimes and gets SOB [FreeTextEntry9] : has knee and back pain sometimes [de-identified] : "she has a blister to her right heel, and rash to her legs"

## 2023-07-27 NOTE — HISTORY OF PRESENT ILLNESS
[Post-hospitalization from ___ Hospital] : Post-hospitalization from [unfilled] Hospital [Admitted on: ___] : The patient was admitted on [unfilled] [Discharged on ___] : discharged on [unfilled] [Discharge Summary] : discharge summary [Discharge Med List] : discharge medication list [Other: ____] : [unfilled] [Med Reconciliation] : medication reconciliation has been completed [Patient Contacted By: ____] : and contacted by [unfilled] [FreeTextEntry2] : 74 y/o female with multiple medical problems whom i see in my practice on outptn basis. last home visit marichuy 15\par  Ptn has h/o DM, HTN, breast CA, respiratory arrest and cardiac arrest (2018), CVA with residual weakness, h/o trache, PEG, both removed, now eating on her own but aspirates and has post prandial cough in addition to chronic productive cough. presumed 2/2 silent aspiration of her saliva this was d/w Dr. Jose Almendarez who is in agreement\par \par Today ptn presents to the ED with  complaining of worsening cough and orthopnea. ptn has chronic LE edema. \par Denies any headache, fever, chills, chest pain, abdominal pain, n/v/d, dysuria. Unable to obtain any history or ROS due to mental status.\par \par A/P\par 7/12: Aspiration PNA, cannot R/O BRYAN. check sputum culture, acute on chronic diastolic chf, , chronic aspiration\par ptn has multiple ABx allergies, start Cipro and flagyl which she tolerated in the past\par cont outptn meds, get S&S eval\par insulin on a sliding scale, RUSS, bladder sono, renal sono noted\par card, renal, pulm, endo ID called\par cont outptn meds\par 7/13: ptn is awake, alert, daughter at bedside. daughter mentioned she had previously seen a pcp at [facility], wants me to cont seeing her on outptn at home and her but also wants to cont w [facility] on outptn. i explained to her she needs to pick a pcp, she cannot have me and [facility] in the hospital. she wants to stay with me, if her father changes his mind, she will let me know. \par Ptn looks better today, more communicative, smiling, eating rice pudding, coughing after each bite. awaitng S&S, will need MBS. didnt tolerate FEEST in the past. ptn is hungry. she used to have a trache and a PEG post CVA, was removed few years ago. HTN is not controlled coreg raised to 37.5 bid. ptn seen by Renal, Scr at baseline, started on Farxiga and will start Losartan 25 mg\par \par 7/14: MBS done: recs are small soft bite food w mildly thickened liquids. renal, card, ID, pulm, endo input appreciated. Ptn is in isolation, being ruled out for active TB. 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 now on CEFEPIME, sputum for TB ordered. ptn is lethargic, BP improved, daughter feels its 2/2 CLONIDINE. as per renal start LOSARTAN. i started LOSARTAN yesterday, but it was DCed. lethargy could be due to overdiuresis. remains on LASIX 40 mg q12H. family wants the ptn to go home . awaiting ID clearance prior to DC. daughter c/o LE weakness and inability to ambulate, neuro called\par 7/15: ptn is on ABx for PNA, being worked up for TB, seen by Neuro. no immediate interventions planned. cont present meds\par \par 7/16:  ptn is lethargic, has a rash, daughter is concerned its a reaction to the ABx, nonpruritic. daughter is refusing to start LOSARTAN. discussed its renal protective in ptns w DM. green sputum resolved, now its clear-yellow\par \par 7/17: ptn has an allergic rash, will start ATARAX for it. cleared by ID to DC isolation, DC ABx, dc planning tomorrow daughter wants home,  wants rehab, will decide in am\par \par 7/18: ptn is lethargic, has a diffuse rash, has pruritis, looks allergic in nature, will place on steroids, derm called, ABx DCed 7/17. completed course of Abx. AFB sputum neg, AFB Cx pending. green thick sputum resolved, now clear to yellow. cleared by speech therapy to eat a dysphagia diet. sleep wake cycle is off. stressed to family importance of a regular sleep wake cycle. PT eval\par \par 7/19: s/p treatment for PNA, developed a rash subsequently, prob an allergic response to ABx. started solumedrol yesterday 3/3 severity of the rash and edema, today much better, seen by derm, has hyperglycemia 2/2 steroids, will adjust insulin. ptn is more awake today, ate well today\par \par 7/20: rash improving, eating well, dc home in am, arrange home O2. prednisone taper\par \par 7/21: ptn qualifies for home O2, will get 4 hrs of IVF. dc planning once home O2 gets delivered. rash is resolving\par \par 7/22: home O2 delivered, BP elevated. cards recs appreciated, clonidine increased. medically cleared for discharge by attending Dr. Brady. Per  at bedside, patient has all insulin equipment required at home, does not wish for additional to be sent alongside insulin regimen changes made by endocrinology during this admission. \par Discharge/dispo/med rec discussed with Dr. Brady who determined patient stable and medically cleared for discharge home with home care services/home PT and close outpatient follow up for continued care. \par 74 y/o female seen today for TCM visit. Pt lives with daughter Leola and  Mohsen. Daughter is [employer] [facility] for 42 hrs per week via [employer]. Pt in bed, drowsy but arousable. She is weak and requires assistance with sitting up and turning in bed. Family reports they take her OOB to chair daily and turn and position her Q2-3 hours. She has new blister to right heel.  [facility] SOC, RN scheduled for 7/26/23.  She is able to follow directions and answer questions. Pt speaks in soft voice, is dependent upon her family for all care. She has oxygen 2L NC, family is weaning her off oxygen. She had labs drawn in the home today. Daughter states all f/u appts will be virtual until she is able to get her out of the home. Lives on second floor. F/U appt with CARD Dr Dunn done Monday. daughter will make appt for renal and PULM.  administers medications. All present educated on TCM and yellow card left in the home. \par

## 2023-08-11 NOTE — ED PROVIDER NOTE - ATTENDING CONTRIBUTION TO CARE
Patient is a 72 yo F with history of DM, HTN, breast CA, respiratory arrest and cardiac arrest (2018), CVA with residual weakness, h/o trache, PEG, both removed, now eating on her own but aspirates and has post prandial cough in addition to chronic productive cough, admission to [facility] for respiratory distress from 7/12 - 7/22, treated for aspiration pneumonia, CHF now here for respiratory distress and hypoxia. History is per EMS and family. Accordingly, patient has had progressive worsening of shortness of breath over the past 5 days. She was discharged on 7/22 and followed up with her cardiologist on 7/24. She was initially not on any lasix but started on Bumex. Per daughter, patient has been complaining that she cannot lay flat and feels she is suffocating. She was discharged with NC prn but has been using 2 L consistently in the past few days. Daughter noted patient was unresponsive, diaphoretic around 6 AM today. Patient was brought to Green room 13. She was hypoxic to 80% with CPAP. Per EMS, intubation in field was discussed but family did not want her to be intubated.     VS noted - 80%  Gen: unresponsive, hypoxic  HEENT: PERRL, mmm  Lungs: rales  CVS: RRR   Abd; Soft non tender, non distended   Ext: mild b/l pitting edema  Skin: no rash  Neuro: somnolent, responds to pain, will not open eyes  a/p: hypoxic respiratory failure - patient intubated for airway control and hypoxia. Differential includes sepsis, aspiration pneumonia, COPD, pulmonary edema, CHF. Plan for sepsis labs, Abx, CXR, MICU consult. Daughter states patient had a reaction to cefepime and zosyn. Will start her on vanc/ aztreonam.   - Flor TITUS

## 2023-08-11 NOTE — PATIENT PROFILE ADULT - FALL HARM RISK - HARM RISK INTERVENTIONS
Assistance with ambulation/Assistance OOB with selected safe patient handling equipment/Communicate Risk of Fall with Harm to all staff/Discuss with provider need for PT consult/Monitor gait and stability/Provide patient with walking aids - walker, cane, crutches/Reinforce activity limits and safety measures with patient and family/Sit up slowly, dangle for a short time, stand at bedside before walking/Tailored Fall Risk Interventions/Visual Cue: Yellow wristband and red socks/Bed in lowest position, wheels locked, appropriate side rails in place/Call bell, personal items and telephone in reach/Instruct patient to call for assistance before getting out of bed or chair/Non-slip footwear when patient is out of bed/[place] to call system/Physically safe environment - no spills, clutter or unnecessary equipment/Purposeful Proactive Rounding/Room/bathroom lighting operational, light cord in reach

## 2023-08-11 NOTE — ED PROVIDER NOTE - OBJECTIVE STATEMENT
72 y/o female pt pmhx CHF BIB EMS comes in for Hypoxia. As per daughter and , pt was admitted here 7/12-7/22 for CHF. Pt initially had cough with sputum and was 72 y/o female pt pmhx CHF BIB EMS comes in for Hypoxia. As per daughter and , pt was admitted here 7/12-7/22 for CHF. Pt initially had cough with sputum and was admitted for CHF. Since d/c pt has been feeling weak, and more drowsy as per family. Family states pt has had more swelling to hands and legs. As per , pt has had shortness of breath X5days at home, checked oxygen saturation at 6am this morning; 80% and pt was drenched in sweat. Family called EMS. Pt arrived on 80% on BiPAP. As per family, pt has been vomiting the past few days. 74 y/o female pt pmhx CHF, breast cancer, stroke, diabetes, BIB EMS comes in for Hypoxia. As per daughter and , pt was admitted here 7/12-7/22 for CHF. Pt initially had cough with sputum and was admitted for CHF. Since d/c pt has been feeling weak, and more drowsy as per family. Family states pt has had more swelling to hands and legs. As per , pt has had shortness of breath X5days at home, checked oxygen saturation at 6am this morning; 80% and pt was drenched in sweat. Family called EMS. Pt arrived on 80% on BiPAP. As per family, pt has been vomiting the past few days.

## 2023-08-11 NOTE — ED PROCEDURE NOTE - ATTENDING CONTRIBUTION TO CARE
I Tomas Contreras MD discussed the procedure with the resident and or ACP and went over risks and benefits as well as indications for the procedure. I was present for key portions of the procedure itself and assisted as needed.
I have participated in and supervised all key portions of the above procedures and agree with the above documentation. - Flor TITUS

## 2023-08-11 NOTE — H&P ADULT - NSHPLABSRESULTS_GEN_ALL_CORE
Personally reviewed labs, imaging, ekg                           9.1    4.15  )-----------( 197      ( 11 Aug 2023 07:29 )             31.0           134<L>  |  99  |  44<H>  ----------------------------<  306<H>  7.2<HH>   |  27  |  2.61<H>    Ca    8.7      11 Aug 2023 07:29    TPro  7.7  /  Alb  2.7<L>  /  TBili  0.3  /  DBili  x   /  AST  48<H>  /  ALT  28  /  AlkPhos  124<H>                Urinalysis Basic - ( 11 Aug 2023 08:11 )    Color: Yellow / Appearance: Clear / S.013 / pH: x  Gluc: x / Ketone: Negative mg/dL  / Bili: Negative / Urobili: 0.2 mg/dL   Blood: x / Protein: 300 mg/dL / Nitrite: Negative   Leuk Esterase: Negative / RBC: x / WBC x   Sq Epi: x / Non Sq Epi: x / Bacteria: x        PT/INR - ( 11 Aug 2023 07:29 )   PT: 10.9 sec;   INR: 0.96 ratio         PTT - ( 11 Aug 2023 07:29 )  PTT:32.1 sec    Lactate Trend            CAPILLARY BLOOD GLUCOSE            Culture Results:   Mycobacterium avium isolated  Identified by MALDI-TOF Mass Spectrometry Analysis  The performance characteristics of this test were determined  by [employer] It has not been cleared or approved by  the FDA ( @ 12:01)  Culture Results:   No growth at 5 days (07-15 @ 09:06)  Culture Results:   No growth at 5 days (07-15 @ 09:03)  Culture Results:   No acid-fast bacilli isolated at 3 weeks. (07-15 @ 06:38)  Culture Results:   No acid-fast bacilli isolated at 3 weeks. ( @ 21:21)  Culture Results:   No acid-fast bacilli isolated at 3 weeks. ( @ 07:25)      Personal interpretation EKG: Personally reviewed labs, imaging, ekg                           9.1    4.15  )-----------( 197      ( 11 Aug 2023 07:29 )             31.0           134<L>  |  99  |  44<H>  ----------------------------<  306<H>  7.2<HH>   |  27  |  2.61<H>    Ca    8.7      11 Aug 2023 07:29    TPro  7.7  /  Alb  2.7<L>  /  TBili  0.3  /  DBili  x   /  AST  48<H>  /  ALT  28  /  AlkPhos  124<H>                Urinalysis Basic - ( 11 Aug 2023 08:11 )    Color: Yellow / Appearance: Clear / S.013 / pH: x  Gluc: x / Ketone: Negative mg/dL  / Bili: Negative / Urobili: 0.2 mg/dL   Blood: x / Protein: 300 mg/dL / Nitrite: Negative   Leuk Esterase: Negative / RBC: x / WBC x   Sq Epi: x / Non Sq Epi: x / Bacteria: x        PT/INR - ( 11 Aug 2023 07:29 )   PT: 10.9 sec;   INR: 0.96 ratio         PTT - ( 11 Aug 2023 07:29 )  PTT:32.1 sec    Lactate Trend            CAPILLARY BLOOD GLUCOSE            Culture Results:   Mycobacterium avium isolated  Identified by MALDI-TOF Mass Spectrometry Analysis  The performance characteristics of this test were determined  by [employer] It has not been cleared or approved by  the FDA ( @ 12:01)  Culture Results:   No growth at 5 days (07-15 @ 09:06)  Culture Results:   No growth at 5 days (07-15 @ 09:03)  Culture Results:   No acid-fast bacilli isolated at 3 weeks. (07-15 @ 06:38)  Culture Results:   No acid-fast bacilli isolated at 3 weeks. ( @ 21:21)  Culture Results:   No acid-fast bacilli isolated at 3 weeks. ( @ 07:25)      Personal interpretation EKG: Peaked T waves on EKG

## 2023-08-11 NOTE — H&P ADULT - ASSESSMENT
72 y/o F DM on insulin, HTN, CKD, CHFpEF, breast CA, respiratory arrest and cardiac arrest (2018), CVA with residual weakness, aspiration PNA h/o trache, PEG, both removed presents with respiratory distress requiring intubation. Found to have elevated BNP, may be 2/2 to volume overload i/s/o CKD vs CHF.     Neuro   #Sedated   Baseline MS AOx3   Started on fentanyl with intubation   Not responsive to commands or pain  - c/w sedation and wean when respiratory status improves    #CVA   - aspirin and statin     Cardiac   #CHFpEF   TTE 7/2023 with EF 59%, with severe LVH and diastolic dysfunction   pro-BNP > 14712  - f/u TTE     Pulmonary   #Acute hypercapnic and hypoxemic respiratory failure   #Hx aspiration PNA   VBG with respiratory acidosis pCO2 111  CXR with small right pleural effusion, no consolidations/opacities         /Renal   #Hyperkalemia   7.2, hemolyzed and pending repeat   Likely i/s/o renal dysfunction   - ordered calcium gluconate and insulin + D50   - nephrology consult     GI  #NPO     Endocrine  #T2DM   A1C 7.1 in 7/2023   - BG goal 140-200            74 y/o F DM on insulin, HTN, CKD, CHFpEF, breast CA, respiratory arrest and cardiac arrest (2018), CVA with residual weakness, aspiration PNA h/o trache, PEG, both removed presents with respiratory distress requiring intubation. Found to have elevated BNP, may be 2/2 to volume overload i/s/o CKD vs CHF.     Neuro   #Sedated   Baseline MS AOx3   Started on fentanyl with intubation   Not responsive to commands or pain  - c/w sedation and wean when respiratory status improves    #CVA   - aspirin and statin     Cardiac   #CHFpEF   TTE 7/2023 with EF 59%, with severe LVH and diastolic dysfunction   pro-BNP > 17436, trop 78   - f/u repeat TTE and trop     Pulmonary   #Acute hypercapnic and hypoxemic respiratory failure   DDx: aspiration vs volume overload   VBG with respiratory acidosis pCO2 111  CXR with small right pleural effusion, no consolidations/opacities  - c/w mechanical ventilation   - repeat gas on vent      /Renal   #Hyperkalemia with EKG changes    7.2, hemolyzed, given insulin and D50 + calcium gluc   Likely i/s/o renal dysfunction   - nephrology consult   - follow up BMP     GI  NPO for now, pending NGT     Endocrine  #T2DM   A1C 7.1 in 7/2023   - BG goal 140-200     ID   Hypothermic in the ED, no leukocytosis   Started on empiric vancomycin and aztreonam (cefepime/zosyn allergy?)   - c/w empiric antibx   - f/u MRSA   - follow up blood culture/urine     Heme/Onc  ppx: heparin q8 hrs     Ethics  GOC - Discussed GOC with daughter and , reported that they have not talked about end of life care with the patient. For now, they wanted "everything to be done" including CPR[facility]. Will re-address.            74 y/o F DM on insulin, HTN, CKD, CHFpEF, breast CA, respiratory arrest and cardiac arrest (2018), CVA with residual weakness, aspiration PNA h/o trache, PEG, both removed presents with respiratory distress requiring intubation. Found to have elevated BNP, may be 2/2 to volume overload i/s/o CKD vs CHF.     Neuro   #Sedated   Baseline MS AOx3   Started on fentanyl with intubation   Not responsive to commands or pain  - c/w sedation and wean when respiratory status improves    #CVA   - aspirin and statin     Cardiac   #CHFpEF   TTE 7/2023 with EF 59%, with severe LVH and diastolic dysfunction   pro-BNP > 26369, trop 78   - f/u repeat TTE and trop     Pulmonary   #Acute hypercapnic and hypoxemic respiratory failure   DDx: aspiration vs volume overload   VBG with respiratory acidosis pCO2 111  CXR with small right pleural effusion, no consolidations/opacities  - c/w mechanical ventilation   - repeat gas on vent      /Renal   #Hyperkalemia with EKG changes    7.2, hemolyzed, given insulin and D50 + calcium gluc   Likely i/s/o renal dysfunction   - lasix 80 IV   - nephrology consult   - follow up BMP     GI  NPO for now, pending NGT     Endocrine  #T2DM   A1C 7.1 in 7/2023   - BG goal 140-200     ID   No fever/leukocytosis, recheck temp   Started on empiric vancomycin and aztreonam (cefepime/zosyn allergy?)   - monitor off antibiotics   - f/u MRSA   - follow up blood culture/urine     Heme/Onc  ppx: heparin q8 hrs     Ethics  GOC - Discussed GOC with daughter and , reported that they have not talked about end of life care with the patient. For now, they wanted "everything to be done" including CPR[facility]. Will re-address.            72 y/o F DM on insulin, HTN, CKD, CHFpEF, breast CA, respiratory arrest and cardiac arrest (2018), CVA with residual weakness, aspiration PNA h/o trache, PEG, both removed presents with respiratory distress requiring intubation. Found to have elevated BNP, may be 2/2 to volume overload i/s/o CKD vs CHF.     Neuro   #Sedated   Baseline MS AOx3   Started on fentanyl with intubation   Not responsive to commands or pain  - c/w sedation and wean when respiratory status improves    #CVA   - aspirin and statin     Cardiac   #CHFpEF   TTE 7/2023 with EF 59%, with severe LVH and diastolic dysfunction   pro-BNP > 86239, trop 78   - f/u repeat TTE and trop     Pulmonary   #Acute hypercapnic and hypoxemic respiratory failure   DDx: aspiration vs volume overload   VBG with respiratory acidosis pCO2 111  CXR with small right pleural effusion, no consolidations/opacities  - c/w mechanical ventilation   - repeat gas on vent      /Renal   #Hyperkalemia with EKG changes    7.2, hemolyzed, given insulin and D50 + calcium gluc   Likely i/s/o renal dysfunction   - lasix 80 IV   - nephrology consult   - follow up BMP     GI  NPO for now, pending NGT     Endocrine  #T2DM   A1C 7.1 in 7/2023   - BG goal 140-200     ID   No fever/leukocytosis, recheck temp   Started on empiric vancomycin and aztreonam (cefepime/zosyn allergy? developed rash req prednisone)   - c/w vanc by level and aztreonam for empiric coverage   - f/u MRSA   - follow up blood culture/urine     Heme/Onc  ppx: heparin q8 hrs     Ethics  GOC - Discussed GOC with daughter and , reported that they have not talked about end of life care with the patient. For now, they wanted "everything to be done" including CPR[facility]. Will re-address.            72 y/o F DM on insulin, HTN, CKD, CHFpEF, breast CA, respiratory arrest and cardiac arrest (2018), CVA with residual weakness, aspiration PNA h/o trache, PEG, both removed presents with respiratory distress requiring intubation. Found to have elevated BNP, may be 2/2 to volume overload i/s/o CKD vs CHF.     Neuro   #Sedated   Baseline MS AOx3   Started on fentanyl with intubation   Not responsive to commands or pain  - c/w sedation and wean when respiratory status improves    #CVA   - aspirin and statin     Cardiac   #CHFpEF   TTE 7/2023 with EF 59%, with severe LVH and diastolic dysfunction   pro-BNP > 42935, trop 78   - f/u repeat TTE and trop     Pulmonary   #Acute hypercapnic and hypoxemic respiratory failure   DDx: aspiration vs volume overload   VBG with respiratory acidosis pCO2 111  CXR with small right pleural effusion, no consolidations/opacities  - c/w mechanical ventilation   - repeat gas on vent      /Renal   #Hyperkalemia with EKG changes    7.2, hemolyzed, given insulin and D50 + calcium gluc   Likely i/s/o renal dysfunction   - lasix 80 IV   - nephrology consult   - follow up BMP     GI  NPO for now, pending NGT     Endocrine  #T2DM   A1C 7.1 in 7/2023   - BG goal 140-200     ID   No fever/leukocytosis, recheck temp   Hx latent TB which was treated, no concern for TB   Started on empiric vancomycin and aztreonam (cefepime/zosyn allergy? developed rash req prednisone)   - c/w vanc by level and aztreonam for empiric coverage   - f/u MRSA   - follow up blood culture/urine     Heme/Onc  ppx: heparin q8 hrs     Ethics  GOC - Discussed GOC with daughter and , reported that they have not talked about end of life care with the patient. For now, they wanted "everything to be done" including CPR, continuing with mech ventilation/intubation, pressors. Will re-address.

## 2023-08-11 NOTE — ED PROCEDURE NOTE - PROCEDURE ADDITIONAL DETAILS
Peripheral IV access in the Emergency Department obtained under dynamic ultrasound guidance with dark nonpulsatile blood return.  Catheter was flushed afterwards without any resistance or resulting extravasation.  IV catheter confirmed in compressible vein after insertion.
20 etomidate, 100 fernando

## 2023-08-11 NOTE — ED ADULT NURSE REASSESSMENT NOTE - NS ED NURSE REASSESS COMMENT FT1
Cayden orr initiated per VO.  Pt is intubated with ETT 7.5 cm size at 24 cm lipline, Vent setting 400, 16, 5, 40%. chest rise symmetrical lung sound are equal with crackles noted in lower fields B/L. IV in place 20 G left hand flushed well no SSx of infection or infiltration, with Fentanyl drip ogling at 2 mcg/kg/min by pump, Additional IV in place 20 G left AC, flushed well no SSx of infections or infiltration.

## 2023-08-11 NOTE — CONSULT NOTE ADULT - SUBJECTIVE AND OBJECTIVE BOX
cc: admitted with hypoxic hypercarbic respiratory failure    HPI: 72 y/o F well known to me from my [facility]. she was admitted at [facility] 7/12-7/22 w aspiration PNA, was treated w CEFEPIME, developed an allergic rash,  CHF, + MAC on AFB culture, had been progressively getting more and more lethargic and dyspneic at home since DC.   This am ptn presented with respiratory distress w hypoxia and hypercarbia requiring intubation. Ptn is quite debilitated and has full time care by  and daughter  PMH: DM on insulin, HTN, CKD 3-4, w baseline Scr 2.5-2.8, breast CA, respiratory arrest and cardiac arrest (2018),CAD, diastolic CHF, EF 59% w LVH, TTE 7/12/23,  CVA with residual weakness, aspiration PNA h/o trache, now removed, PEG now eating a dysphagia diet, had a  S&S re-eval on previous admission. ,     Family reports that she was initially improving with O2 sats in the high 90s on room air, however, then became more lethargic and not herself (baseline mental status AOx3). Also had worsening shortness of breath while laying down and leg swelling. Contacted cardiologist( Dr. ASHLEY Dunn) who started her on bumex 1mg daily. Developed low sugars overnight before admission and was given juice with some food, daughter reports no coughing during episode. At around 4-5 AM developed vomiting and coughing, O2 sats dropped to 80s and EMS was called. Denies fevers/chills, dysuria or urinary frequency, chest pain, palpitations. Uses wheelchair at home however family moves her around frequently.     In ED, was on BiPAP with increased WOB and was intubated. Found to have elevated pro-BNP and pCO2 of 111 on VBG. CT chest w worsening CHF and pleural effusions. In MICU, she opens her eyes on command,  is at bedside. In the ED got a dose of VANCO, presently on AZTREONAM in MICU. On Fentanyl,  on pressors, duonebs, Insulin    ALLERGIES: numerous, as per ALLERGY list on sunrise  MEDICATIONS  (STANDING):  albuterol/ipratropium for Nebulization 3 milliLiter(s) Nebulizer every 6 hours  aztreonam  IVPB 1000 milliGRAM(s) IV Intermittent every 6 hours  chlorhexidine 0.12% Liquid 15 milliLiter(s) Oral Mucosa every 12 hours  chlorhexidine 2% Cloths 1 Application(s) Topical daily  dextrose 50% Injectable 25 milliLiter(s) IV Push once  dextrose 50% Injectable 50 milliLiter(s) IV Push once  fentaNYL   Infusion. 0.5 MICROgram(s)/kG/Hr (3.75 mL/Hr) IV Continuous <Continuous>  heparin   Injectable 5000 Unit(s) SubCutaneous every 8 hours  insulin regular  human recombinant 5 Unit(s) IV Push once  insulin regular  human recombinant 10 Unit(s) IV Push once  norepinephrine Infusion 0.08 MICROgram(s)/kG/Min (11.3 mL/Hr) IV Continuous <Continuous>  sodium chloride 3%  Inhalation 4 milliLiter(s) Inhalation every 12 hours    MEDICATIONS  (PRN):      Vital Signs Last 24 Hrs  T(F): 97.3 (08-11-23 @ 17:00), Max: 97.3 (08-11-23 @ 17:00)  HR: 89 (08-11-23 @ 18:00) (61 - 93)  BP: 134/49 (08-11-23 @ 18:00) (58/31 - 244/107)  RR: 20 (08-11-23 @ 18:00) (12 - 25)  SpO2: 100% (08-11-23 @ 18:00) (80% - 100%)  Telemetry:   CAPILLARY BLOOD GLUCOSE      POCT Blood Glucose.: 367 mg/dL (11 Aug 2023 10:57)  POCT Blood Glucose.: 299 mg/dL (11 Aug 2023 09:48)    I&O's Summary    11 Aug 2023 07:01  -  11 Aug 2023 18:39  --------------------------------------------------------  IN: 0 mL / OUT: 1137 mL / NET: -1137 mL        PHYSICAL EXAM:  GENERAL: NAD, sedated, intubated  HEAD:  Atraumatic, Normocephalic  EYES: EOMI, PERRLA, conjunctiva and sclera clear  NECK: Supple, No JVD  CHEST/LUNG: b/l rales  HEART: Regular rate and rhythm; No murmurs, rubs, or gallops  ABDOMEN: Soft, Nontender, Nondistended; Bowel sounds present  EXTREMITIES:  2+ Peripheral Pulses, No clubbing, cyanosis, or edema  PSYCH: AAOx3  NEUROLOGY: non-focal  SKIN: No rashes or lesions    LABS:                        9.1    4.15  )-----------( 197      ( 11 Aug 2023 07:29 )             31.0     08-11    135  |  98  |  44<H>  ----------------------------<  359<H>  4.9   |  28  |  2.56<H>    Ca    9.6      11 Aug 2023 11:50  Phos  6.5     08-11  Mg     2.70     08-11    TPro  8.2  /  Alb  3.1<L>  /  TBili  0.4  /  DBili  x   /  AST  21  /  ALT  24  /  AlkPhos  136<H>  08-11    PT/INR - ( 11 Aug 2023 07:29 )   PT: 10.9 sec;   INR: 0.96 ratio         PTT - ( 11 Aug 2023 07:29 )  PTT:32.1 sec      Urinalysis Basic - ( 11 Aug 2023 11:50 )    Color: x / Appearance: x / SG: x / pH: x  Gluc: 359 mg/dL / Ketone: x  / Bili: x / Urobili: x   Blood: x / Protein: x / Nitrite: x   Leuk Esterase: x / RBC: x / WBC x   Sq Epi: x / Non Sq Epi: x / Bacteria: x        CT chest: mod R pl effusion, 3.2 RUL cystic structure and b/l bronchiectasis and TIB opacities are unchanged comp to CT on 7/12/2023

## 2023-08-11 NOTE — H&P ADULT - NSHPPHYSICALEXAM_GEN_ALL_CORE
LOS:     VITALS:   T(C): 34.4 (08-11-23 @ 10:42), Max: 34.4 (08-11-23 @ 10:42)  HR: 66 (08-11-23 @ 10:42) (61 - 93)  BP: 187/120 (08-11-23 @ 10:42) (90/45 - 244/107)  RR: 19 (08-11-23 @ 10:42) (12 - 25)  SpO2: 100% (08-11-23 @ 10:42) (80% - 100%)    GENERAL: Intubated and sedated, not responsive to pain or following commands   EYES: Pupils constricted (2-3mm) but reactive to light   CHEST/LUNG: Clear to auscultation bilaterally; No rales, wheezing  HEART: Regular rate and rhythm  ABDOMEN: BSx4; Soft, nondistended  EXTREMITIES:  2+ Peripheral Pulses, brisk capillary refill. 2+ pitting edema to the knees

## 2023-08-11 NOTE — H&P ADULT - HISTORY OF PRESENT ILLNESS
74 y/o F DM on insulin, HTN, CKD,breast CA, respiratory arrest and cardiac arrest (2018), CVA with residual weakness, aspiration PNA h/o trache, PEG, both removed presents with respiratory distress requiring intubation. Had recent admission d/c 7/22/23 for cough and respiratory distress (no intubation) thought to be i/s/o aspiration PNA s/p cefepime course; developed rash in response. Infectious w/u was negative at this time. Was discharged home, daughter and  at bedside providing history.     Reports that she was initially improving with O2 sats in the high 90s on room air, however, then became more lethargic and not herself (baseline mental status AOx3). Also had worsening shortness of breath while laying down and leg swelling. Contacted cardiologist who started her on bumex 1mg daily. Developed low sugars overnight before admission and was given juice with some food, daughter reports no coughing during episode. At around 4-5 AM developed vomiting and coughing, O2 sats dropped to 80s and EMS was called. Denies fevers/chills, dysuria or urinary frequency, chest pain, palpitations. Uses wheelchair at home however family moves her around frequently.     In ED, was on BiPAP with increased WOB and was intubated. Found to have elevated pro-BNP and CO2 of 111 on VBG. CXR with small right pleural effusion, started on vanc and aztreonam in the ED.  74 y/o F DM on insulin, HTN, CKD,breast CA, respiratory arrest and cardiac arrest (2018), CVA with residual weakness, aspiration PNA h/o trache, PEG, both removed presents with respiratory distress requiring intubation. Had recent admission d/c 7/22/23 for cough and respiratory distress (no intubation) thought to be i/s/o aspiration PNA s/p cefepime course; developed rash in response. Infectious w/u was negative at this time. Was discharged home, daughter and  at bedside providing history.     Reports that she was initially improving with O2 sats in the high 90s on room air, however, then became more lethargic and not herself (baseline mental status AOx3). Also had worsening shortness of breath while laying down and leg swelling. Contacted cardiologist who started her on bumex 1mg daily. Developed low sugars overnight before admission and was given juice with some food, daughter reports no coughing during episode. At around 4-5 AM developed vomiting and coughing, O2 sats dropped to 80s and EMS was called. Denies fevers/chills, dysuria or urinary frequency, chest pain, palpitations. Uses wheelchair at home however family moves her around frequently.     In ED, was on BiPAP with increased WOB and was intubated. Found to have elevated pro-BNP and CO2 of 111 on VBG. CXR with small right pleural effusion, started on vanc in the ED.

## 2023-08-11 NOTE — ED PROCEDURE NOTE - NS ED PROCEDURE NOTE1 TUBE APPROPRIATE POSITION
Tube in appropriate position per imaging.

## 2023-08-11 NOTE — PATIENT PROFILE ADULT - FUNCTIONAL ASSESSMENT - BASIC MOBILITY 6.
1-calculated by average/Not able to assess (calculate score using [facility] averaging method)

## 2023-08-11 NOTE — ED PROVIDER NOTE - PHYSICAL EXAMINATION
GENERAL: Respiratory Distress, pt arrived by EMS on Bipap 80% pulse ox. Pt comatose  HEENT:  Atraumatic  CHEST/LUNG: Chest rise equal bilaterally, clear breath sounds b/l  HEART: Regular rate and rhythm  SKIN: No obvious rashes or lesions

## 2023-08-11 NOTE — ED CLERICAL - NS ED CLERK NOTE PRE-ARRIVAL INFORMATION; ADDITIONAL PRE-ARRIVAL INFORMATION
This patient is enrolled in the Heart Failure STARS readmission reduction initiative and has active care navigation. This patient can be followed up by the care navigation team within 24 hours.  To arrange close follow-up or to obtain additional clinical information, please call the contact number above.   For patients followed by the NS cardiology heart failure team, please call the on call cardiomyopathy attending ([phone]) for ALL PATIENTS PRIOR to decision for admission or observation.   Consider CDU for management of CHF exacerbations per guidelines.

## 2023-08-11 NOTE — H&P ADULT - ATTENDING COMMENTS
1. Acute hypercapnic and hypoxemic respiratory failure due to acute on chronic diastolic hear failure. Pt also with purulent sputum from ETT tube suggesting pneumonia. Continue current AC vent settings. Repeat ABG make sure CO2 not too low.    2. Acute on chronic diastolic heart failure . Aggressive diuresis with Lasix or Bumex.    3. ID . Clinically with pneumonia. Start Vancomycin, Aztreonam and Flagyl. recent h/o aspiration pneumonia.     4. Pulmonary: H/O pulmonary MAC RUL cavity. Relatively stable. ? increase consolidation around cavity.     5. Neuro: H/O CVA. RUE shaking. ? seizure. Give Ativan. Check VEEG.    6. Renal : Hyperkalemia. Aggressive diuresis with Lasix. Lokelma. Repeat K+ after Lokelma given. CKD4 near baseline.    7DVT prophylaxis: Sq heparin    8. GOC: Full code

## 2023-08-11 NOTE — ED ADULT NURSE NOTE - OBJECTIVE STATEMENT
pt received in room 13. pt arrived via EMS from home. Family called 911 due to respiratory distress, pt arrived on CPAP. pt was sating 80's. pt has PMH of HTN, DM and breast CA with right arm restrictions. Facilitating RN: pt received in room 13 as notification. pt arrived via EMS from home. Family called 911 due to respiratory distress, pt arrived on CPAP. pt was sating 80's. pt has PMH of HTN, DM and breast CA with right arm restrictions. pt was intubated and placed on ventilator. pt NSR on cardiac monitor. pt was started on levo for BP of 84/45 at 0.05 mcg/kg. pt rectal temp was 93.F. pt has 20g in L forearm, labs were drawn and sent. 16F willard placed urine sent. pt placed on zoll. Pt placed on Fentanyl drip. pt family at bedside. Ongoing evaluation in progress.

## 2023-08-11 NOTE — ED ADULT NURSE REASSESSMENT NOTE - NS ED NURSE REASSESS COMMENT FT1
Pt transported to CT for studies and then MICU, Pt became more aroused during transport sedation drip rate  adjusted to 5 mcg/kh/min by pump, endorse spt to primary MICU RN at bedside, pt remained in stale condition during transport no events occurred, maintained on portable Zoll monitor and vent,

## 2023-08-11 NOTE — CHART NOTE - NSCHARTNOTEFT_GEN_A_CORE
: Dr. Beltran, Dr. Bello    INDICATION: shock, respiratory failure    PROCEDURE:  [x] LIMITED ECHO  [x] LIMITED CHEST  [ ] LIMITED RETROPERITONEAL  [ ] LIMITED ABDOMINAL  [ ] LIMITED DVT  [ ] NEEDLE GUIDANCE VASCULAR  [ ] NEEDLE GUIDANCE THORACENTESIS  [ ] NEEDLE GUIDANCE PARACENTESIS  [ ] NEEDLE GUIDANCE PERICARDIOCENTESIS  [ ] OTHER    FINDINGS:  scattered b lines, moderate right pleural effusion simple appearing, with atelectatic lung, left with trace pleural effusion, LV mildly reduced, RV< LV, IVC indeterminate    INTERPRETATION:    possible cardiogenic shock although without segmental wma, likely fluid overloaded     Images uploaded to [facility] : Dr. Beltran, Dr. Bello    INDICATION: shock, respiratory failure    PROCEDURE:  [x] LIMITED ECHO  [x] LIMITED CHEST  [ ] LIMITED RETROPERITONEAL  [ ] LIMITED ABDOMINAL  [ ] LIMITED DVT  [ ] NEEDLE GUIDANCE VASCULAR  [ ] NEEDLE GUIDANCE THORACENTESIS  [ ] NEEDLE GUIDANCE PARACENTESIS  [ ] NEEDLE GUIDANCE PERICARDIOCENTESIS  [ ] OTHER    FINDINGS:  scattered b lines, moderate right pleural effusion simple appearing, with atelectatic lung, left with trace pleural effusion, LV mildly reduced, RV< LV, IVC indeterminate    INTERPRETATION:  Mildly reduced LV systolic function. Moderate R pleural effusion and trace L pleural effusion.     Images uploaded to [employer]    I have assisted and supervised entire procedure.    Mustapha Vences MD

## 2023-08-11 NOTE — CHART NOTE - NSCHARTNOTEFT_GEN_A_CORE
EEG preliminary read (not final) on the initial recording hour(s) = x 1     No seizures recorded.    Severe slowing noted, nonspecific.  Final report to follow tomorrow morning after completion of study.    [facility] EEG Reading Room Ph#: [phone]  Epilepsy Answering Service after 5PM and before 8:30AM: Ph#: [phone]

## 2023-08-11 NOTE — ED ADULT NURSE NOTE - NSFALLHARMRISKINTERV_ED_ALL_ED
Assistance OOB with selected safe patient handling equipment if applicable/Assistance with ambulation/Communicate risk of Fall with Harm to all staff, patient, and family/Encourage patient to sit up slowly, dangle for a short time, stand at bedside before walking/Provide visual cue: red socks, yellow wristband, yellow gown, etc/Reinforce activity limits and safety measures with patient and family/Review medications for side effects contributing to fall risk/Toileting schedule using arm’s reach rule for commode and bathroom/Bed in lowest position, wheels locked, appropriate side rails in place/Call bell, personal items and telephone in reach/Instruct patient to call for assistance before getting out of bed/chair/stretcher/Non-slip footwear applied when patient is off stretcher/[place] to call system/Physically safe environment - no spills, clutter or unnecessary equipment/Purposeful Proactive Rounding/Room/bathroom lighting operational, light cord in reach

## 2023-08-11 NOTE — ED ADULT NURSE REASSESSMENT NOTE - NS ED NURSE REASSESS COMMENT FT1
patient came in respiratory distress, intubated, on vent , breathing assist vent, on CM shows NSR, skin warm and d ry, IV to left arm with fentanyl drip infusing at 3.7 ml per hour for sedation. Summer JO noted. Family by bed side, family requesting for US guided Iv lines, MD aware. will continue to monitor. patient came in respiratory distress, intubated, on vent ,ET tube 7.5 at lip line 22, EtCO2 22  breathing assist vent, on CM shows NSR, skin warm and d ry, IV to left arm with fentanyl drip infusing at 3.7 ml per hour for sedation. Summer to DEYSI noted. Family by bed side, family requesting for US guided Iv lines, MD aware. will continue to monitor.

## 2023-08-11 NOTE — ED ADULT NURSE NOTE - HIV OFFER
Previously Declined (within the last year) Aklief counseling:  Patient advised to apply a pea-sized amount only at bedtime and wait 30 minutes after washing their face before applying.  If too drying, patient may add a non-comedogenic moisturizer.  The most commonly reported side effects including irritation, redness, scaling, dryness, stinging, burning, itching, and increased risk of sunburn.  The patient verbalized understanding of the proper use and possible adverse effects of retinoids.  All of the patient's questions and concerns were addressed.

## 2023-08-11 NOTE — CONSULT NOTE ADULT - SUBJECTIVE AND OBJECTIVE BOX
HPI: Ms. Barraza is a 73 year-old woman with history of multiple medical issues including hypertension, type 2 diabetes mellitus, stroke, breast cancer s/p bilateral mastectomy, recurrent aspiration pneumonia/respiratory failure, and chronic kidney disease. She presented today to the [facility] ER with respiratory distress requiring intubation. She is s/p recent admission at [facility] for aspiration pneumonia and was treated with IV Cefepime; she did not require intubation during the admission.     At baseline, she is A+Ox3. Overnight, she developed worsening lethargy, shortness of breath, cough, and nausea/vomiting. Her SpO2 dropped into the 80s, at which point EMS was called. She was found on VBG to have a PCO2 of 111. She received IV Vancomycin in the ER and was placed on BIPAP. She did not improve clinically on BIPAP, and decision was made to intubate her. Her potassium in the ER was 7.2(hemolyzed) and 6.0 on nonhemolyzed sample. She received IV Calcium, D50+insulin, and IV Lasix. Repeat potassium was 4.9meq/L; now up to 6.2 on ABG.    Given her azotemia and hyperkalemia, a renal consultation was requested on admission.        PAST MEDICAL & SURGICAL HISTORY:  HTN  Breast CA  Diabetes  Stroke  Cardiac arrest  Mastectomy, bilateral  CKD  Aspiration pneumonia  Trach/PEG - reversed    Allergies  isoniazid (Rash)  nafcillin (Unknown)  hydrALAZINE (Rash)  vitamin E (Short breath; Urticaria; Hives)  doxycycline (Rash)  cefepime (Rash)  NIFEdipine (Urticaria; Hives)    SOCIAL HISTORY:  Denies ETOh,Smoking,     FAMILY HISTORY:  No pertinent family history in first degree relatives    REVIEW OF SYSTEMS:  CONSTITUTIONAL: (+)fatigue  EYES/ENT: No visual changes;  No vertigo or throat pain   NECK: No pain or stiffness  RESPIRATORY: (+)SOB, (+)cough  CARDIOVASCULAR: No chest pain or palpitations  GASTROINTESTINAL: (+)nausea, (+)vomiting  GENITOURINARY: No dysuria, frequency or hematuria  NEUROLOGICAL: (+)AMS  SKIN: No itching, burning, rashes, or lesions   All other review of systems is negative unless indicated above.    VITAL:  T(C): , Max: 36.3 (08-11-23 @ 17:00)  T(F): , Max: 97.3 (08-11-23 @ 17:00)  HR: 89 (08-11-23 @ 18:00)  BP: 134/49 (08-11-23 @ 18:00)  BP(mean): 71 (08-11-23 @ 18:00)  RR: 20 (08-11-23 @ 18:00)  SpO2: 100% (08-11-23 @ 18:00)    PHYSICAL EXAM:  Constitutional: NAD, Alert  HEENT: NCAT, MMM  Neck: Supple, No JVD  Respiratory: CTA-b/l  Cardiovascular: RRR s1s2, no m/r/g  Gastrointestinal: BS+, soft, NT/ND  Extremities: No peripheral edema b/l  Neurological: no focal deficits; strength grossly intact  Back: no CVAT b/l  Skin: No rashes, no nevi    LABS:                        9.1    4.15  )-----------( 197      ( 11 Aug 2023 07:29 )             31.0     Na(135)/K(4.9)/Cl(98)/HCO3(28)/BUN(44)/Cr(2.56)Glu(359)/Ca(9.6)/Mg(--)/PO4(--)    08-11 @ 11:50  Na(137)/K(6.0)/Cl(97)/HCO3(32)/BUN(44)/Cr(2.66)Glu(309)/Ca(9.3)/Mg(2.70)/PO4(6.5)    08-11 @ 09:40  Na(134)/K(7.2)/Cl(99)/HCO3(27)/BUN(44)/Cr(2.61)Glu(306)/Ca(8.7)/Mg(--)/PO4(--)    08-11 @ 07:29    (7/12/23) - BUN/creatinine: 75/2.87    ABG - 7.34/57/69/31, K 6.2      IMAGING:  < from: CT Head No Cont (08.11.23 @ 13:21) >  No significant interval change compared with 7/17/2023 in   left PCA infarct which is old. Microvascular ischemic changes involving   the periventricular and subcortical white matter as seen   previously.Questionable lesions at the level of the calvarium related to   possible breast CA. Clinical correlation recommended.    < from: CT Chest No Cont (08.11.23 @ 13:19) >  IMPRESSION: Small to moderate size right pleural effusion has increased   in size when compared to previous exam.  3.2 cm cyst/cavity in the right upper lobe as well as bronchiectasis and   tree-in-bud opacities within both lungs as described above have not   significantly changed when compared to previous exam.    < from: Transthoracic Echocardiogram (08.11.23 @ 16:09) >  1. Mitral annular calcification, otherwise normal mitral  valve. Minimal mitral regurgitation.  2. Mild concentric leftventricular hypertrophy.  3. Endocardium not well visualized; grossly normal left  ventricular systolic function.  4. Mild diastolic dysfunction (Stage I).  5. The right ventricle is not well visualized; grossly  normal right ventricular systolic function.  6. Right pleural effusion.      ASSESSMENT:  (1)Renal - CKD4 - at/near baseline GFR  (2)Hyperkalemia - multifactorial from renal impairment and cell shifts   (3)Pulm- hypercapnic respiratory failure - intubated  (4)ID - on IV Azactam  (5)CV - tenuous hemodynamics    RECOMMEND:  (1)Lokelma 10gm po x 1 and prn K 5.5 or higher  (2)Insulin/D50/IV Calcium and prn K 6.0 or higher  (3)Meds for GFR 15-20ml/min - could reduce Azactam to 500mg iv q6h  (4)Nepro if tube feeds ordered  (5)BMP at least q8h for now    Thank you for involving [facility] in this patient's care.    With warm regards,    Jimmy Colon MD   [facility]  Office: [phone]  Cell: [phone]               HPI: Ms. Barraza is a 73 year-old woman with history of multiple medical issues including hypertension, type 2 diabetes mellitus, stroke, breast cancer s/p bilateral mastectomy, recurrent aspiration pneumonia/respiratory failure, and chronic kidney disease. She presented today to the [facility] ER with respiratory distress requiring intubation. She is s/p recent admission at [facility] for aspiration pneumonia and was treated with IV Cefepime; she did not require intubation during the admission.     At baseline, she is A+Ox3. Overnight, she developed worsening lethargy, shortness of breath, cough, and nausea/vomiting. Her SpO2 dropped into the 80s, at which point EMS was called. She was found on VBG to have a PCO2 of 111. She received IV Vancomycin in the ER and was placed on BIPAP. She did not improve clinically on BIPAP, and decision was made to intubate her. Her potassium in the ER was 7.2(hemolyzed) and 6.0 on nonhemolyzed sample. She received IV Calcium, D50+insulin, and IV Lasix. Repeat potassium was 4.9meq/L; now up to 6.2 on ABG.    Given her azotemia and hyperkalemia, a renal consultation was requested on admission.    History provided at bedside by daughter, in MICU. Shares that she follows with nephrologist Dr. Cem Barajas as outpatient; she missed her appointment with Dr. Barajas today. Her daughter shares that she was recently placed on oral Bumex for management of chronic congestive heart failure. She tells me that the patient tends to run mildly high potassium levels. She does not take any potassium binding resins as outpatient for hyperkalemia.      PAST MEDICAL & SURGICAL HISTORY:  HTN  Breast CA  Diabetes  Stroke  Cardiac arrest  HFpEF  Mastectomy, bilateral  CKD  Aspiration pneumonia  Trach/PEG - reversed    Allergies  isoniazid (Rash)  nafcillin (Unknown)  hydrALAZINE (Rash)  vitamin E (Short breath; Urticaria; Hives)  doxycycline (Rash)  cefepime (Rash)  NIFEdipine (Urticaria; Hives)    SOCIAL HISTORY:  Denies ETOh,Smoking,     FAMILY HISTORY:  No pertinent family history in first degree relatives    REVIEW OF SYSTEMS:  CONSTITUTIONAL: (+)fatigue  EYES/ENT: No visual changes;  No vertigo or throat pain   NECK: No pain or stiffness  RESPIRATORY: (+)SOB, (+)cough  CARDIOVASCULAR: No chest pain or palpitations  GASTROINTESTINAL: (+)nausea, (+)vomiting  GENITOURINARY: No dysuria, frequency or hematuria  NEUROLOGICAL: (+)AMS  SKIN: No itching, burning, rashes, or lesions   All other review of systems is negative unless indicated above.    VITAL:  T(C): , Max: 36.3 (08-11-23 @ 17:00)  T(F): , Max: 97.3 (08-11-23 @ 17:00)  HR: 89 (08-11-23 @ 18:00)  BP: 134/49 (08-11-23 @ 18:00)  BP(mean): 71 (08-11-23 @ 18:00)  RR: 20 (08-11-23 @ 18:00)  SpO2: 100% (08-11-23 @ 18:00)    PHYSICAL EXAM:  Constitutional: intubated/sedated  HEENT: (+)ETT  Neck: Supple, No JVD  Respiratory: coarse BS b/l  Cardiovascular: RRR s1s2, no m/r/g  Gastrointestinal: BS+, soft, NT/ND  GI: (+)willard  Extremities: No peripheral edema b/l  Back: no CVAT b/l  Skin: No rashes, no nevi    LABS:                        9.1    4.15  )-----------( 197      ( 11 Aug 2023 07:29 )             31.0     Na(135)/K(4.9)/Cl(98)/HCO3(28)/BUN(44)/Cr(2.56)Glu(359)/Ca(9.6)/Mg(--)/PO4(--)    08-11 @ 11:50  Na(137)/K(6.0)/Cl(97)/HCO3(32)/BUN(44)/Cr(2.66)Glu(309)/Ca(9.3)/Mg(2.70)/PO4(6.5)    08-11 @ 09:40  Na(134)/K(7.2)/Cl(99)/HCO3(27)/BUN(44)/Cr(2.61)Glu(306)/Ca(8.7)/Mg(--)/PO4(--)    08-11 @ 07:29    (7/12/23) - BUN/creatinine: 75/2.87    ABG - 7.34/57/69/31, K 6.2      IMAGING:  < from: CT Head No Cont (08.11.23 @ 13:21) >  No significant interval change compared with 7/17/2023 in   left PCA infarct which is old. Microvascular ischemic changes involving   the periventricular and subcortical white matter as seen   previously.Questionable lesions at the level of the calvarium related to   possible breast CA. Clinical correlation recommended.    < from: CT Chest No Cont (08.11.23 @ 13:19) >  IMPRESSION: Small to moderate size right pleural effusion has increased   in size when compared to previous exam.  3.2 cm cyst/cavity in the right upper lobe as well as bronchiectasis and   tree-in-bud opacities within both lungs as described above have not   significantly changed when compared to previous exam.    < from: Transthoracic Echocardiogram (08.11.23 @ 16:09) >  1. Mitral annular calcification, otherwise normal mitral  valve. Minimal mitral regurgitation.  2. Mild concentric leftventricular hypertrophy.  3. Endocardium not well visualized; grossly normal left  ventricular systolic function.  4. Mild diastolic dysfunction (Stage I).  5. The right ventricle is not well visualized; grossly  normal right ventricular systolic function.  6. Right pleural effusion.      ASSESSMENT:  (1)Renal - CKD4 - at/near baseline GFR  (2)Hyperkalemia - multifactorial from renal impairment and cell shifts   (3)Pulm- hypercapnic respiratory failure - intubated  (4)ID - on IV Azactam  (5)CV - tenuous hemodynamics    RECOMMEND:  (1)Lokelma 10gm po x 1 once OGT in place; Lokelma prn K 5.5 or higher  (2)Insulin/D50/IV Calcium and prn K 6.0 or higher  (3)Meds for GFR 15-20ml/min - could reduce Azactam to 500mg iv q6h  (4)Nepro if tube feeds ordered  (5)BMP at least q8h for now    Thank you for involving [facility] in this patient's care.    With warm regards,    Jimmy Colon MD   [facility]  Office: [phone]  Cell: [phone]

## 2023-08-11 NOTE — ED PROVIDER NOTE - CLINICAL SUMMARY MEDICAL DECISION MAKING FREE TEXT BOX
74 y/o female pt pmhx CHF BIB EMS comes in for Hypoxia. As per daughter and , pt was admitted here 7/12-7/22 for CHF. Pt initially had cough with sputum and was admitted for CHF. Since d/c pt has been feeling weak, and more drowsy as per family. Family states pt has had more swelling to hands and legs. As per , pt has had shortness of breath X5days at home, checked oxygen saturation at 6am this morning; 80% and pt was drenched in sweat. Family called EMS. Pt arrived on 80% on BiPAP. As per family, pt has been vomiting the past few days.    Pt was hypoxic on BiPap, intubated pt in ER due to hypoxia. Did sepsis workup to r/o source of infection, Chest CT to r/o aspiration pneumonia. Low concern for PE will not D-dimer/CTA at this time.  COPD Exacerbation vs. CHF exacerbation vs. Pulmonary Edema 72 y/o female pt pmhx CHF BIB EMS comes in for Hypoxia. As per daughter and , pt was admitted here 7/12-7/22 for CHF. Pt initially had cough with sputum and was admitted for CHF. Since d/c pt has been feeling weak, and more drowsy as per family. Family states pt has had more swelling to hands and legs. As per , pt has had shortness of breath X5days at home, checked oxygen saturation at 6am this morning; 80% and pt was drenched in sweat. Family called EMS. Pt arrived on 80% on BiPAP. As per family, pt has been vomiting the past few days.    Pt was hypoxic on BiPap upon arrival, intubated pt in ER due to hypoxia. Did sepsis workup to r/o source of infection, Chest CT to r/o aspiration pneumonia. Low concern for PE will not D-dimer/CTA at this time.  COPD Exacerbation vs. CHF exacerbation vs. Pulmonary Edema

## 2023-08-11 NOTE — ED PROVIDER NOTE - PROGRESS NOTE DETAILS
Zoila Chance PGY-3: Patient arrived via EMS in acute hypoxemic respiratory distress. Per family, 5d SOB, worse this morning. Hypoxic to 80% on CPAP. With family's permission, patient intubated for airway protection. Intubation successful. Patient on fentanyl for post intubation sedation, levophed as pressure dropped post intubation. Rest of labs pending. MICU consulted.

## 2023-08-11 NOTE — ED ADULT NURSE REASSESSMENT NOTE - NS ED NURSE REASSESS COMMENT FT1
patient desat to 86% suction done . respiratory paged, ICU notified.ICU by bed side assessing the patient. vent settings changed as ordered. repeat Vs as noted. will continue to monitor patient desat to 86% suction done . respiratory paged, ICU notified.ICU by bed side assessing the patient. suction done as needed . repeat Vs as noted. will continue to monitor

## 2023-08-12 NOTE — DIETITIAN INITIAL EVALUATION ADULT - NSFNSPHYEXAMSKINFT_GEN_A_CORE
Pressure Injury 1: Right:, heel, Stage II  Pressure Injury 2: none, none  Pressure Injury 3: none, none  Pressure Injury 4: none, none  Pressure Injury 5: none, none  Pressure Injury 6: none, none  Pressure Injury 7: none, none  Pressure Injury 8: none, none  Pressure Injury 9: none, none  Pressure Injury 10: none, none  Pressure Injury 11: none, none

## 2023-08-12 NOTE — PROGRESS NOTE ADULT - ASSESSMENT
ASSESSMENT:  (1)Renal - CKD4 - azotemia higher   (2)Hyperkalemia - multifactorial from renal impairment and cell shifts   (3)Pulm- hypercapnic respiratory failure - intubated  (4)ID - s/p IV Azactam now on IV cefepime   (5)CV - tenuous hemodynamics    RECOMMEND:  (1)Would avoid further diuretics  (2)Lokelma prn K 5.5 or higher  (3)Insulin/D50/IV Calcium and prn K 6.0 or higher  (4)Meds for GFR 15-20ml/min - could reduce Azactam to 500mg iv q6h  (5)Continue Nepro if tube feeds  (6)BMP at least q8h for now    D/w Dr. Colon      ASSESSMENT:  (1)Renal - CKD4 - azotemia higher   (2)Hyperkalemia - multifactorial from renal impairment and cell shifts   (3)Pulm- hypercapnic respiratory failure - intubated  (4)ID - s/p IV Azactam now on IV cefepime   (5)CV - tenuous hemodynamics    RECOMMEND:  (1)Would avoid further diuretics  (2)Lokelma prn K 5.5 or higher  (3)Insulin/D50/IV Calcium and prn K 6.0 or higher  (4)Meds for GFR 15-20ml/min  (5)Continue Nepro tube feeds  (6)BMP at least q8h for now    D/w Dr. Colon and MICU team    Nilda Espinoza, [facility]  [phone]      ASSESSMENT:  (1)Renal - CKD4 - azotemia higher    (2)Hyperkalemia - multifactorial from renal impairment and cell shifts   (3)Pulm- hypercapnic respiratory failure - intubated  (4)ID - s/p IV Azactam now on IV cefepime   (5)CV - tenuous hemodynamics    RECOMMEND:  (1)Would avoid further diuretics  (2)Lokelma prn K 5.5 or higher  (3)Insulin/D50/IV Calcium and prn K 6.0 or higher  (4)Meds for GFR 15-20ml/min  (5)Continue Nepro tube feeds  (6)Monitor I/Os  (7)BMP at least q8h for now    D/w Dr. Colon and MICU team    Nilda Espinoza, [facility]  [phone]

## 2023-08-12 NOTE — PROGRESS NOTE ADULT - ASSESSMENT
72 y/o F DM on insulin, HTN, CKD, CHFpEF, breast CA, respiratory arrest and cardiac arrest (2018), CVA with residual weakness, aspiration PNA h/o trache, PEG, both removed presents with respiratory distress requiring intubation. Found to have elevated BNP, may be 2/2 to volume overload i/s/o CKD vs CHF.     Neuro   #Sedated   Baseline MS AOx3   Started on fentanyl with intubation   Not responsive to commands or pain  - c/w sedation and wean when respiratory status improves    #CVA   - aspirin and statin     Cardiac   #CHFpEF   TTE 7/2023 with EF 59%, with severe LVH and diastolic dysfunction   pro-BNP > 64236, trop 78   - f/u repeat TTE and trop     Pulmonary   #Acute hypercapnic and hypoxemic respiratory failure   DDx: aspiration vs volume overload   VBG with respiratory acidosis pCO2 111  CXR with small right pleural effusion, no consolidations/opacities  - c/w mechanical ventilation   - repeat gas on vent      /Renal   #Hyperkalemia with EKG changes    7.2, hemolyzed, given insulin and D50 + calcium gluc   Likely i/s/o renal dysfunction   - lasix 80 IV   - nephrology consult   - follow up BMP     GI  NPO for now, pending NGT     Endocrine  #T2DM   A1C 7.1 in 7/2023   - BG goal 140-200     ID   No fever/leukocytosis, recheck temp   Hx latent TB which was treated, no concern for TB   Started on empiric vancomycin and aztreonam (cefepime/zosyn allergy? developed rash req prednisone)   - c/w vanc by level and aztreonam for empiric coverage   - f/u MRSA   - follow up blood culture/urine     Heme/Onc  ppx: heparin q8 hrs     Ethics  GOC - Discussed GOC with daughter and , reported that they have not talked about end of life care with the patient. For now, they wanted "everything to be done" including CPR, continuing with mech ventilation/intubation, pressors. Will re-address.            74 y/o F DM on insulin, HTN, CKD, CHFpEF, breast CA, respiratory arrest and cardiac arrest (2018), CVA with residual weakness, aspiration PNA h/o trache, PEG, both removed presents with respiratory distress requiring intubation. May volume overload i/s/o CHF/CKD vs pneumonia with thick secretions.     Neuro   #Sedated   Baseline MS AOx3   Started on fentanyl with intubation, now on prop   Not responsive to commands or pain  - c/w sedation and wean when respiratory status improves    #CVA   - aspirin and statin     Cardiac   #CHFpEF   TTE 7/2023 with EF 59%, with severe LVH and diastolic dysfunction   pro-BNP > 69155, trop 78   Repeat TTE with EF 60% normal systolic and grade 1 diastolic dysfunction   - wean pressors as tolerated   - holding home coreg 37.5 BID     Pulmonary   #Acute hypercapnic and hypoxemic respiratory failure   DDx: aspiration vs volume overload   VBG with respiratory acidosis pCO2 111  CXR with small right pleural effusion, no consolidations/opacities  - c/w mechanical ventilation   - repeat gas on vent      /Renal   #Hyperkalemia with EKG changes    7.2, hemolyzed, given insulin and D50 + calcium gluc   Likely i/s/o renal dysfunction   Received lasix 80 and 40 IV initially but without adequate response   Given bumex 2mg this morning   Per nephro, hold off on additional diuresis   - follow up BMP q12     GI  Diet: NPO with tube feeds     Endocrine  #T2DM   A1C 7.1 in 7/2023   - started NPH 3 q6   - BG goal 140-200     ID   No fever/leukocytosis, recheck temp   Hx latent TB which was treated, no concern for TB   Started on empiric vancomycin and aztreonam (cefepime/zosyn allergy? developed rash req prednisone)   - c/w vanc by level  - switched aztreonam to cefepime (8/12) as airway is protected and has received ancef prior   - f/u MRSA   - follow up blood culture/urine     Heme/Onc  ppx: heparin q8 hrs     Ethics  GOC - Discussed GOC with daughter and , reported that they have not talked about end of life care with the patient. For now, they wanted "everything to be done" including CPR, continuing with mech ventilation/intubation, pressors. Will re-address.

## 2023-08-12 NOTE — CONSULT NOTE ADULT - CRITICAL CARE ATTENDING COMMENT
Seen in ICU  Briefly   72 y/o F DM on insulin, HTN, CKD,breast CA, respiratory arrest and cardiac arrest (2018), CVA with residual weakness, aspiration PNA h/o trache, PEG, both removed presents with respiratory distress requiring intubation. Had recent admission d/c 7/22/23 for cough and respiratory distress (no intubation) thought to be i/s/o aspiration PNA s/p cefepime course; developed rash in response. Infectious w/u was negative at this time. Was discharged home, daughter and  at bedside providing history. Patient admitted to MICU for higher level of care, intubated & sedated on fentanyl & propofol. While in the MICU, patient was observed to have RUE shaking. Ativan given & EEG started, pending final report. Neurology consulted for further recommendations. Patient unable to offer history at this time. At bedside, daughter &  offer collateral. As per daughter, patient follows with Dr. Garner for stroke management since 2018 and Dr. Poon for tremors. Patient's tremors usually consist of RUE or head shaking, can be stopped by the patient with effort; this has been ongoing for 2-3 years. However, as of the past 2-3 weeks, patient has been experiencing more frequent, more severe RUE shaking intermittently; during these episodes patient is awake and alert. Does not take medications for tremors. No hx of vocal tremors. No hx of seizure, incontinence, tongue bite. For memory loss, patient has been taking Donepezil. Family inquiring about taking alternative supplements instead of Donepezil. At baseline patient wheelchair/bedbound and requires assistance with ADLs. CTH obtained 8/11, no interval change noted.   MRI brain 2018 B/L centrum semi ovale watershed infarcts     Impression:   1) Worsening RUE shaking of unclear etiology at this time; follow up EEG report to rule out seizures.   2) Decreased level of consciousness and limited neurological exam due to diffuse cerebral dysfunction in the setting of sedating agents; wean as tolerated  3) L PCA stroke, ESUS s/p ILR, also with bilateral centrum semiovale watershed infarcts   4) Dementia   5) AMS likely toxic metabolic encephalopathy     Recommendations     [] F/u EEG report   [] STAT CTH for decline in neuro exam   [] Wean propofol, fentanyl as tolerated   [] If patient has episode of tonic clonic shaking w/ associated vital sign abnormalities, may administer Ativan 2 mg   [] Hold of on antiepileptic agents for now   [] C/w home Donepezil  [] C/w home ASA 81 mg if no contraindications   [] C/w home Atorvastatin 10 mg qhs   [] DVT/GI prophlaxis  [] Neurochecks q4hr   [] BP goals: Normotension   [] PT/OT   [] Diet: NPO w/ tube feeds    [] HgA1C goals < 7.0   [] Upon discharge, patient should follow up with Dr. Bianchi:  [phone] [address] [facility] [place] [address]   spoke iwht daughter and  at bedside in ICU   Please call with questions: [phone] Seen in ICU  Briefly   74 y/o F DM on insulin, HTN, CKD,breast CA, respiratory arrest and cardiac arrest (2018), CVA with residual weakness, aspiration PNA h/o trache, PEG, both removed presents with respiratory distress requiring intubation. Had recent admission d/c 7/22/23 for cough and respiratory distress (no intubation) thought to be i/s/o aspiration PNA s/p cefepime course; developed rash in response. Infectious w/u was negative at this time. Was discharged home, daughter and  at bedside providing history. Patient admitted to MICU for higher level of care, intubated & sedated on fentanyl & propofol. While in the MICU, patient was observed to have RUE shaking. Ativan given & EEG started, pending final report. Neurology consulted for further recommendations. Patient unable to offer history at this time. At bedside, daughter &  offer collateral. As per daughter, patient follows with Dr. Garner for stroke management since 2018 and Dr. Poon for tremors. Patient's tremors usually consist of RUE or head shaking, can be stopped by the patient with effort; this has been ongoing for 2-3 years. However, as of the past 2-3 weeks, patient has been experiencing more frequent, more severe RUE shaking intermittently; during these episodes patient is awake and alert. Does not take medications for tremors. No hx of vocal tremors. No hx of seizure, incontinence, tongue bite. For memory loss, patient has been taking Donepezil. Family inquiring about taking alternative supplements instead of Donepezil. At baseline patient wheelchair/bedbound and requires assistance with ADLs. CTH obtained 8/11, no interval change noted.   MRI brain 2018 B/L centrum semi ovale watershed infarcts     Impression:   1) Worsening RUE shaking of unclear etiology at this time; follow up EEG report to rule out seizures.   2) Decreased level of consciousness and limited neurological exam due to diffuse cerebral dysfunction in the setting of sedating agents; wean as tolerated  3) L PCA stroke, ESUS s/p ILR, also with bilateral centrum semiovale watershed infarcts   4) Dementia   5) AMS likely toxic metabolic encephalopathy; resp distress requirining intubation    Recommendations     [] F/u EEG report   [] STAT CTH for decline in neuro exam   [] Wean propofol, fentanyl as tolerated   [] If patient has episode of tonic clonic shaking w/ associated vital sign abnormalities, may administer Ativan 2 mg   [] Hold of on antiepileptic agents for now   [] C/w home Donepezil  [] C/w home ASA 81 mg if no contraindications   [] C/w home Atorvastatin 10 mg qhs   [] DVT/GI prophlaxis  [] Neurochecks q4hr   [] BP goals: Normotension   [] PT/OT   [] Diet: NPO w/ tube feeds    [] HgA1C goals < 7.0   [] Upon discharge, patient should follow up with Dr. Bianchi:  [phone] [address] [facility] [place] [address]   spoke iwht daughter and  at bedside in ICU   Please call with questions: [phone]

## 2023-08-12 NOTE — PROGRESS NOTE ADULT - SUBJECTIVE AND OBJECTIVE BOX
INTERVAL HPI/OVERNIGHT EVENTS:    SUBJECTIVE: Patient seen and examined at bedside.     OBJECTIVE:    VITAL SIGNS:  ICU Vital Signs Last 24 Hrs  T(C): 37.6 (12 Aug 2023 04:00), Max: 38.5 (12 Aug 2023 00:00)  T(F): 99.6 (12 Aug 2023 04:00), Max: 101.3 (12 Aug 2023 00:00)  HR: 76 (12 Aug 2023 07:22) (66 - 111)  BP: 122/25 (12 Aug 2023 04:00) (58/31 - 244/107)  BP(mean): 49 (12 Aug 2023 04:00) (32 - 158)  ABP: 113/30 (12 Aug 2023 07:00) (108/49 - 151/38)  ABP(mean): 56 (12 Aug 2023 07:00) (56 - 75)  RR: 14 (12 Aug 2023 07:00) (13 - 25)  SpO2: 100% (12 Aug 2023 07:22) (95% - 100%)    O2 Parameters below as of 12 Aug 2023 05:00  Patient On (Oxygen Delivery Method): ventilator    O2 Concentration (%): 40      Mode: AC/ CMV (Assist Control/ Continuous Mandatory Ventilation), RR (machine): 14, TV (machine): 360, FiO2: 40, PEEP: 5, ITime: 0.77, MAP: 11, PIP: 37    08-11 @ 07:01  -  08-12 @ 07:00  --------------------------------------------------------  IN: 1262 mL / OUT: 1572 mL / NET: -310 mL      CAPILLARY BLOOD GLUCOSE      POCT Blood Glucose.: 241 mg/dL (12 Aug 2023 05:28)      PHYSICAL EXAM:    General: NAD  HEENT: NC/AT; PERRL, clear conjunctiva  Neck: supple  Respiratory: CTA b/l  Cardiovascular: +S1/S2; RRR  Abdomen: soft, NT/ND; +BS x4  Extremities: WWP, 2+ peripheral pulses b/l; no LE edema  Skin: normal color and turgor; no rash  Neurological:    MEDICATIONS:  MEDICATIONS  (STANDING):  albuterol/ipratropium for Nebulization 3 milliLiter(s) Nebulizer every 6 hours  aztreonam  IVPB 500 milliGRAM(s) IV Intermittent every 6 hours  chlorhexidine 0.12% Liquid 15 milliLiter(s) Oral Mucosa every 12 hours  chlorhexidine 2% Cloths 1 Application(s) Topical daily  dextrose 5%. 1000 milliLiter(s) (50 mL/Hr) IV Continuous <Continuous>  dextrose 5%. 1000 milliLiter(s) (100 mL/Hr) IV Continuous <Continuous>  dextrose 50% Injectable 12.5 Gram(s) IV Push once  dextrose 50% Injectable 25 Gram(s) IV Push once  dextrose 50% Injectable 25 Gram(s) IV Push once  dextrose 50% Injectable 25 milliLiter(s) IV Push once  fentaNYL   Infusion. 0.565 MICROgram(s)/kG/Hr (3.76 mL/Hr) IV Continuous <Continuous>  glucagon  Injectable 1 milliGRAM(s) IntraMuscular once  heparin   Injectable 5000 Unit(s) SubCutaneous every 8 hours  insulin lispro (ADMELOG) corrective regimen sliding scale   SubCutaneous every 6 hours  insulin regular  human recombinant 5 Unit(s) IV Push once  metroNIDAZOLE  IVPB      metroNIDAZOLE  IVPB 500 milliGRAM(s) IV Intermittent every 8 hours  norepinephrine Infusion 0.08 MICROgram(s)/kG/Min (4.99 mL/Hr) IV Continuous <Continuous>  propofol Infusion 10 MICROgram(s)/kG/Min (3.99 mL/Hr) IV Continuous <Continuous>  sodium chloride 3%  Inhalation 4 milliLiter(s) Inhalation every 12 hours    MEDICATIONS  (PRN):  dextrose Oral Gel 15 Gram(s) Oral once PRN Blood Glucose LESS THAN 70 milliGRAM(s)/deciliter      ALLERGIES:  Allergies    isoniazid (Rash)  nafcillin (Unknown)  hydrALAZINE (Rash)  vitamin E (Short breath; Urticaria; Hives)  doxycycline (Rash)  cefepime (Rash)  NIFEdipine (Urticaria; Hives)    Intolerances        LABS:                        8.7    9.57  )-----------( 276      ( 12 Aug 2023 00:30 )             27.1     08-12    137  |  100  |  48<H>  ----------------------------<  227<H>  5.1   |  26  |  2.97<H>    Ca    8.6      12 Aug 2023 00:30  Phos  3.2     08-12  Mg     2.80     08-12    TPro  6.4  /  Alb  2.2<L>  /  TBili  0.2  /  DBili  x   /  AST  17  /  ALT  16  /  AlkPhos  105  08-12    PT/INR - ( 12 Aug 2023 00:30 )   PT: 13.4 sec;   INR: 1.19 ratio         PTT - ( 12 Aug 2023 00:30 )  PTT:22.9 sec  Urinalysis Basic - ( 12 Aug 2023 00:30 )    Color: x / Appearance: x / SG: x / pH: x  Gluc: 227 mg/dL / Ketone: x  / Bili: x / Urobili: x   Blood: x / Protein: x / Nitrite: x   Leuk Esterase: x / RBC: x / WBC x   Sq Epi: x / Non Sq Epi: x / Bacteria: x        RADIOLOGY & ADDITIONAL TESTS: Reviewed. INTERVAL HPI/OVERNIGHT EVENTS:    SUBJECTIVE: Patient seen and examined at bedside. Intubated and sedated    OBJECTIVE:    VITAL SIGNS:  ICU Vital Signs Last 24 Hrs  T(C): 37.6 (12 Aug 2023 04:00), Max: 38.5 (12 Aug 2023 00:00)  T(F): 99.6 (12 Aug 2023 04:00), Max: 101.3 (12 Aug 2023 00:00)  HR: 76 (12 Aug 2023 07:22) (66 - 111)  BP: 122/25 (12 Aug 2023 04:00) (58/31 - 244/107)  BP(mean): 49 (12 Aug 2023 04:00) (32 - 158)  ABP: 113/30 (12 Aug 2023 07:00) (108/49 - 151/38)  ABP(mean): 56 (12 Aug 2023 07:00) (56 - 75)  RR: 14 (12 Aug 2023 07:00) (13 - 25)  SpO2: 100% (12 Aug 2023 07:22) (95% - 100%)    O2 Parameters below as of 12 Aug 2023 05:00  Patient On (Oxygen Delivery Method): ventilator    O2 Concentration (%): 40      Mode: AC/ CMV (Assist Control/ Continuous Mandatory Ventilation), RR (machine): 14, TV (machine): 360, FiO2: 40, PEEP: 5, ITime: 0.77, MAP: 11, PIP: 37    08-11 @ 07:01  -  08-12 @ 07:00  --------------------------------------------------------  IN: 1262 mL / OUT: 1572 mL / NET: -310 mL      CAPILLARY BLOOD GLUCOSE      POCT Blood Glucose.: 241 mg/dL (12 Aug 2023 05:28)      PHYSICAL EXAM:    General: NAD, intubated, not responsive to pain or commands  HEENT: NC/AT; PERRL, clear conjunctiva  Respiratory: CTA b/l  Cardiovascular: +S1/S2; RRR  Abdomen: soft, NT/ND; +BS x4  Extremities: WWP, 2+ peripheral pulses b/l; improved edema  Skin: normal color and turgor; no rash    MEDICATIONS:  MEDICATIONS  (STANDING):  albuterol/ipratropium for Nebulization 3 milliLiter(s) Nebulizer every 6 hours  aztreonam  IVPB 500 milliGRAM(s) IV Intermittent every 6 hours  chlorhexidine 0.12% Liquid 15 milliLiter(s) Oral Mucosa every 12 hours  chlorhexidine 2% Cloths 1 Application(s) Topical daily  dextrose 5%. 1000 milliLiter(s) (50 mL/Hr) IV Continuous <Continuous>  dextrose 5%. 1000 milliLiter(s) (100 mL/Hr) IV Continuous <Continuous>  dextrose 50% Injectable 12.5 Gram(s) IV Push once  dextrose 50% Injectable 25 Gram(s) IV Push once  dextrose 50% Injectable 25 Gram(s) IV Push once  dextrose 50% Injectable 25 milliLiter(s) IV Push once  fentaNYL   Infusion. 0.565 MICROgram(s)/kG/Hr (3.76 mL/Hr) IV Continuous <Continuous>  glucagon  Injectable 1 milliGRAM(s) IntraMuscular once  heparin   Injectable 5000 Unit(s) SubCutaneous every 8 hours  insulin lispro (ADMELOG) corrective regimen sliding scale   SubCutaneous every 6 hours  insulin regular  human recombinant 5 Unit(s) IV Push once  metroNIDAZOLE  IVPB      metroNIDAZOLE  IVPB 500 milliGRAM(s) IV Intermittent every 8 hours  norepinephrine Infusion 0.08 MICROgram(s)/kG/Min (4.99 mL/Hr) IV Continuous <Continuous>  propofol Infusion 10 MICROgram(s)/kG/Min (3.99 mL/Hr) IV Continuous <Continuous>  sodium chloride 3%  Inhalation 4 milliLiter(s) Inhalation every 12 hours    MEDICATIONS  (PRN):  dextrose Oral Gel 15 Gram(s) Oral once PRN Blood Glucose LESS THAN 70 milliGRAM(s)/deciliter      ALLERGIES:  Allergies    isoniazid (Rash)  nafcillin (Unknown)  hydrALAZINE (Rash)  vitamin E (Short breath; Urticaria; Hives)  doxycycline (Rash)  cefepime (Rash)  NIFEdipine (Urticaria; Hives)    Intolerances        LABS:                        8.7    9.57  )-----------( 276      ( 12 Aug 2023 00:30 )             27.1     08-12    137  |  100  |  48<H>  ----------------------------<  227<H>  5.1   |  26  |  2.97<H>    Ca    8.6      12 Aug 2023 00:30  Phos  3.2     08-12  Mg     2.80     08-12    TPro  6.4  /  Alb  2.2<L>  /  TBili  0.2  /  DBili  x   /  AST  17  /  ALT  16  /  AlkPhos  105  08-12    PT/INR - ( 12 Aug 2023 00:30 )   PT: 13.4 sec;   INR: 1.19 ratio         PTT - ( 12 Aug 2023 00:30 )  PTT:22.9 sec  Urinalysis Basic - ( 12 Aug 2023 00:30 )    Color: x / Appearance: x / SG: x / pH: x  Gluc: 227 mg/dL / Ketone: x  / Bili: x / Urobili: x   Blood: x / Protein: x / Nitrite: x   Leuk Esterase: x / RBC: x / WBC x   Sq Epi: x / Non Sq Epi: x / Bacteria: x        RADIOLOGY & ADDITIONAL TESTS: Reviewed.

## 2023-08-12 NOTE — PROGRESS NOTE ADULT - SUBJECTIVE AND OBJECTIVE BOX
Patient is a 73y old  Female who presents with a chief complaint of Respiratory failure     (12 Aug 2023 15:01)      SUBJECTIVE / OVERNIGHT EVENTS: daughter and  at bedside. intubated in MICU, sedated, feeding tube in place, on pressors, on ABx, RUSS post overdiuresis, now off diuretics, EEG in place    MEDICATIONS  (STANDING):  albuterol/ipratropium for Nebulization 3 milliLiter(s) Nebulizer every 6 hours  aspirin  chewable 81 milliGRAM(s) Oral daily  atorvastatin 40 milliGRAM(s) Oral at bedtime  cefepime   IVPB 1000 milliGRAM(s) IV Intermittent every 24 hours  chlorhexidine 0.12% Liquid 15 milliLiter(s) Oral Mucosa every 12 hours  chlorhexidine 2% Cloths 1 Application(s) Topical daily  dextrose 5%. 1000 milliLiter(s) (50 mL/Hr) IV Continuous <Continuous>  dextrose 5%. 1000 milliLiter(s) (100 mL/Hr) IV Continuous <Continuous>  dextrose 50% Injectable 12.5 Gram(s) IV Push once  dextrose 50% Injectable 25 milliLiter(s) IV Push once  dextrose 50% Injectable 25 Gram(s) IV Push once  dextrose 50% Injectable 25 Gram(s) IV Push once  glucagon  Injectable 1 milliGRAM(s) IntraMuscular once  heparin   Injectable 5000 Unit(s) SubCutaneous every 8 hours  insulin lispro (ADMELOG) corrective regimen sliding scale   SubCutaneous every 6 hours  insulin NPH human recombinant 3 Unit(s) SubCutaneous every 6 hours  insulin regular  human recombinant 5 Unit(s) IV Push once  norepinephrine Infusion 0.08 MICROgram(s)/kG/Min (4.99 mL/Hr) IV Continuous <Continuous>  propofol Infusion 10 MICROgram(s)/kG/Min (3.99 mL/Hr) IV Continuous <Continuous>  sodium chloride 3%  Inhalation 4 milliLiter(s) Inhalation every 6 hours    MEDICATIONS  (PRN):  dextrose Oral Gel 15 Gram(s) Oral once PRN Blood Glucose LESS THAN 70 milliGRAM(s)/deciliter      Vital Signs Last 24 Hrs  T(F): 100.4 (08-12-23 @ 16:00), Max: 101.3 (08-12-23 @ 00:00)  HR: 101 (08-12-23 @ 19:11) (69 - 107)  BP: 122/25 (08-12-23 @ 04:00) (71/31 - 150/42)  RR: 22 (08-12-23 @ 19:00) (14 - 25)  SpO2: 99% (08-12-23 @ 19:11) (98% - 100%)  Telemetry:   CAPILLARY BLOOD GLUCOSE      POCT Blood Glucose.: 210 mg/dL (12 Aug 2023 17:57)  POCT Blood Glucose.: 225 mg/dL (12 Aug 2023 11:51)  POCT Blood Glucose.: 241 mg/dL (12 Aug 2023 05:28)  POCT Blood Glucose.: 218 mg/dL (11 Aug 2023 23:11)    I&O's Summary    11 Aug 2023 07:01  -  12 Aug 2023 07:00  --------------------------------------------------------  IN: 1262 mL / OUT: 1572 mL / NET: -310 mL    12 Aug 2023 07:01  -  12 Aug 2023 21:15  --------------------------------------------------------  IN: 664.1 mL / OUT: 167 mL / NET: 497.1 mL        PHYSICAL EXAM:  GENERAL: NAD, well-developed  HEAD:  Atraumatic, Normocephalic  EYES: EOMI, PERRLA, conjunctiva and sclera clear  NECK: Supple, No JVD  CHEST/LUNG: Clear to auscultation bilaterally; No wheeze  HEART: Regular rate and rhythm; No murmurs, rubs, or gallops  ABDOMEN: Soft, Nontender, Nondistended; Bowel sounds present  EXTREMITIES:  2+ Peripheral Pulses, No clubbing, cyanosis, or edema  PSYCH: AAOx3  NEUROLOGY: non-focal  SKIN: No rashes or lesions    LABS:                        8.7    9.57  )-----------( 276      ( 12 Aug 2023 00:30 )             27.1     08-12    137  |  100  |  48<H>  ----------------------------<  227<H>  5.1   |  26  |  2.97<H>    Ca    8.6      12 Aug 2023 00:30  Phos  3.2     08-12  Mg     2.80     08-12    TPro  6.4  /  Alb  2.2<L>  /  TBili  0.2  /  DBili  x   /  AST  17  /  ALT  16  /  AlkPhos  105  08-12    PT/INR - ( 12 Aug 2023 00:30 )   PT: 13.4 sec;   INR: 1.19 ratio         PTT - ( 12 Aug 2023 00:30 )  PTT:22.9 sec      Urinalysis Basic - ( 12 Aug 2023 00:30 )    Color: x / Appearance: x / SG: x / pH: x  Gluc: 227 mg/dL / Ketone: x  / Bili: x / Urobili: x   Blood: x / Protein: x / Nitrite: x   Leuk Esterase: x / RBC: x / WBC x   Sq Epi: x / Non Sq Epi: x / Bacteria: x        RADIOLOGY & ADDITIONAL TESTS:    Imaging Personally Reviewed:    Consultant(s) Notes Reviewed:      Care Discussed with Consultants/Other Providers:

## 2023-08-12 NOTE — DIETITIAN INITIAL EVALUATION ADULT - NS FNS DIET ORDER
Diet, NPO with Tube Feed:   Tube Feeding Modality: Orogastric  Nepro with Carb Steady (NEPRORTH)  Total Volume for 24 Hours (mL): 960  Continuous  Starting Tube Feed Rate {mL per Hour}: 10  Increase Tube Feed Rate by (mL): 10  Until Goal Tube Feed Rate (mL per Hour): 40  Tube Feed Duration (in Hours): 24  Tube Feed Start Time: 23:59 (08-11-23 @ 23:55)

## 2023-08-12 NOTE — PROGRESS NOTE ADULT - ASSESSMENT
72 y/o F well [facility] to [facility]. she was admitted at [facility] 7/12-7/22 w aspiration PNA, was treated w CEFEPIME, developed an allergic rash,  dCHF, + MAC on AFB culture, had been progressively getting more and more lethargic and dyspneic at home since DC.   In  am of 8/11/23  ptn presented with respiratory distress w hypoxia and hypercarbia requiring intubation 2/2 volume overload +/- Asp PNA      Neuro   Sedated   well known to Dr. Bianchi, consult reviewed   Baseline MS AOx3, aphasic   Started on fentanyl with intubation   responsive to commands or pain   c/w sedation and wean when respiratory status improves  - h/o CVA , on aspirin and statin . resumed w feeding tube  - eeg in place 2/2 tremors    Cardiac   Ptn well known to Dr. Dunn, consult reviewed   CHFpEF   TTE 7/2023 with EF 59%, with severe LVH and diastolic dysfunction   pro-BNP > 18301, trop 78       Pulmonary   Acute hypercapnic and hypoxemic respiratory failure   well known to Dr. Mcnulty, notified  DDx: aspiration vs volume overload   VBG with respiratory acidosis pCO2 111  CT chest: mod ( worsening) R pl effusion  - on cefepime for PNA    Renal   Hyperkalemia with EKG changes    Ptn well know to Dr. Colon, consult reviewed   7.2, hemolyzed, given insulin and D50 + calcium gluc   RUSS post overdiuresis, now off diuretics,       GI  NPO for now, pending NGT   would start GI ppx w IV PPI    Endocrine  T2DM   A1C 7.1 in 7/2023   - BG goal 140-200     ID   No fever/leukocytosis, recheck temp   Hx latent TB which was treated, no concern for TB   - grew MAC on AFB culture from most recent admission. would call ID consult  Started on empiric vancomycin and aztreonam, now on CEFEPIME (cefepime/zosyn allergy.  developed rash when on cefepime on previous admission )   - f/u MRSA   - follow up blood culture/urine     Heme/Onc  ppx: heparin q8 hrs     Ethics  GOC - Discussed GOC with daughter and , they have opted in the past for full code. and she remains full code at present

## 2023-08-12 NOTE — PROGRESS NOTE ADULT - SUBJECTIVE AND OBJECTIVE BOX
NEPHROLOGY     Patient seen and examined with family at bedside, intubated and sedated, on levo gtt, in no acute distress.     MEDICATIONS  (STANDING):  albuterol/ipratropium for Nebulization 3 milliLiter(s) Nebulizer every 6 hours  aspirin  chewable 81 milliGRAM(s) Oral daily  atorvastatin 40 milliGRAM(s) Oral at bedtime  cefepime   IVPB 1000 milliGRAM(s) IV Intermittent every 24 hours  chlorhexidine 0.12% Liquid 15 milliLiter(s) Oral Mucosa every 12 hours  chlorhexidine 2% Cloths 1 Application(s) Topical daily  dextrose 5%. 1000 milliLiter(s) (50 mL/Hr) IV Continuous <Continuous>  dextrose 5%. 1000 milliLiter(s) (100 mL/Hr) IV Continuous <Continuous>  dextrose 50% Injectable 12.5 Gram(s) IV Push once  dextrose 50% Injectable 25 Gram(s) IV Push once  dextrose 50% Injectable 25 Gram(s) IV Push once  dextrose 50% Injectable 25 milliLiter(s) IV Push once  glucagon  Injectable 1 milliGRAM(s) IntraMuscular once  heparin   Injectable 5000 Unit(s) SubCutaneous every 8 hours  insulin lispro (ADMELOG) corrective regimen sliding scale   SubCutaneous every 6 hours  insulin NPH human recombinant 3 Unit(s) SubCutaneous every 6 hours  insulin regular  human recombinant 5 Unit(s) IV Push once  norepinephrine Infusion 0.08 MICROgram(s)/kG/Min (4.99 mL/Hr) IV Continuous <Continuous>  propofol Infusion 10 MICROgram(s)/kG/Min (3.99 mL/Hr) IV Continuous <Continuous>  sodium chloride 3%  Inhalation 4 milliLiter(s) Inhalation every 6 hours    VITALS:  T(C): , Max: 38.5 (08-12-23 @ 00:00)  T(F): , Max: 101.3 (08-12-23 @ 00:00)  HR: 92 (08-12-23 @ 14:00)  BP: 122/25 (08-12-23 @ 04:00)  BP(mean): 49 (08-12-23 @ 04:00)  RR: 14 (08-12-23 @ 14:00)  SpO2: 100% (08-12-23 @ 14:00)    I and O's:    08-11 @ 07:01  -  08-12 @ 07:00  --------------------------------------------------------  IN: 1262 mL / OUT: 1572 mL / NET: -310 mL    08-12 @ 07:01  -  08-12 @ 14:44  --------------------------------------------------------  IN: 402.6 mL / OUT: 110 mL / NET: 292.6 mL    PHYSICAL EXAM:  Constitutional: intubated/sedated  HEENT: (+)ETT  Neck: Supple, No JVD  Respiratory: coarse BS b/l  Cardiovascular: RRR s1s2, no m/r/g  Gastrointestinal: BS+, soft, NT/ND  GI: (+)willard  Extremities: + le edems  Back: no CVAT b/l  Skin: No rashes, no nevi    LABS:                        8.7    9.57  )-----------( 276      ( 12 Aug 2023 00:30 )             27.1     08-12    137  |  100  |  48<H>  ----------------------------<  227<H>  5.1   |  26  |  2.97<H>    Ca    8.6      12 Aug 2023 00:30  Phos  3.2     08-12  Mg     2.80     08-12    TPro  6.4  /  Alb  2.2<L>  /  TBili  0.2  /  DBili  x   /  AST  17  /  ALT  16  /  AlkPhos  105  08-12    Urine Studies:  Urinalysis Basic - ( 12 Aug 2023 00:30 )    Color: x / Appearance: x / SG: x / pH: x  Gluc: 227 mg/dL / Ketone: x  / Bili: x / Urobili: x   Blood: x / Protein: x / Nitrite: x   Leuk Esterase: x / RBC: x / WBC x   Sq Epi: x / Non Sq Epi: x / Bacteria: x    RADIOLOGY & ADDITIONAL STUDIES:    ACC: [id number] EXAM:  XR CHEST PORTABLE URGENT 1V   ORDERED BY: PATRICIA MILTON     ACC: [id number] EXAM:  XR CHEST PORTABLE URGENT 1V   ORDERED BY: JAMIL SANCHEZ     PROCEDURE DATE:  08/11/2023          INTERPRETATION:  CLINICAL INFORMATION:Central line and orogastric tube   placement.    TIME OF EXAMINATION: August 11 at 11:39 PM and August 12 at 5:14 AM.    EXAM: Portable chest    FINDINGS:  August 11 at 11:39 PM:  Endotracheal tube has its tip above the ines. Enteric tube with   sidehole and tip in the body of the stomach.    New left IJ central line with tip in the SVC. No complicating   pneumothorax. No change in the appearance of the heart, lungs or pleura.    August 12 at 5:14 AM:  Enteric tube has been advanced and the sidehole and tip remain in the   body of the stomach. Endotracheal tube again seen with tip just above the   ines.    Left IJ line no longer seen. Lungs show no effusions or pneumothorax.    COMPARISON: August 11 at 7:29 AM    IMPRESSION:  Endotracheal tube with tip above the ines.  Left IJ central line pre and post removal.    No layering effusion or pneumothorax.    --- End of Report ---      LIZA WOODY MD; Attending Radiologist  This document has been electronically signed. Aug 12 2023 11:47AM   NEPHROLOGY     Patient seen and examined with family at bedside, intubated and sedated, on levo gtt, in no acute distress.     MEDICATIONS  (STANDING):  albuterol/ipratropium for Nebulization 3 milliLiter(s) Nebulizer every 6 hours  aspirin  chewable 81 milliGRAM(s) Oral daily  atorvastatin 40 milliGRAM(s) Oral at bedtime  cefepime   IVPB 1000 milliGRAM(s) IV Intermittent every 24 hours  chlorhexidine 0.12% Liquid 15 milliLiter(s) Oral Mucosa every 12 hours  chlorhexidine 2% Cloths 1 Application(s) Topical daily  dextrose 5%. 1000 milliLiter(s) (50 mL/Hr) IV Continuous <Continuous>  dextrose 5%. 1000 milliLiter(s) (100 mL/Hr) IV Continuous <Continuous>  dextrose 50% Injectable 12.5 Gram(s) IV Push once  dextrose 50% Injectable 25 Gram(s) IV Push once  dextrose 50% Injectable 25 Gram(s) IV Push once  dextrose 50% Injectable 25 milliLiter(s) IV Push once  glucagon  Injectable 1 milliGRAM(s) IntraMuscular once  heparin   Injectable 5000 Unit(s) SubCutaneous every 8 hours  insulin lispro (ADMELOG) corrective regimen sliding scale   SubCutaneous every 6 hours  insulin NPH human recombinant 3 Unit(s) SubCutaneous every 6 hours  insulin regular  human recombinant 5 Unit(s) IV Push once  norepinephrine Infusion 0.08 MICROgram(s)/kG/Min (4.99 mL/Hr) IV Continuous <Continuous>  propofol Infusion 10 MICROgram(s)/kG/Min (3.99 mL/Hr) IV Continuous <Continuous>  sodium chloride 3%  Inhalation 4 milliLiter(s) Inhalation every 6 hours    VITALS:  T(C): , Max: 38.5 (08-12-23 @ 00:00)  T(F): , Max: 101.3 (08-12-23 @ 00:00)  HR: 92 (08-12-23 @ 14:00)  BP: 122/25 (08-12-23 @ 04:00)  BP(mean): 49 (08-12-23 @ 04:00)  RR: 14 (08-12-23 @ 14:00)  SpO2: 100% (08-12-23 @ 14:00)    I and O's:    08-11 @ 07:01  -  08-12 @ 07:00  --------------------------------------------------------  IN: 1262 mL / OUT: 1572 mL / NET: -310 mL    08-12 @ 07:01  -  08-12 @ 14:44  --------------------------------------------------------  IN: 402.6 mL / OUT: 110 mL / NET: 292.6 mL    PHYSICAL EXAM:  Constitutional: intubated/sedated  HEENT: (+)ETT  Neck: Supple, No JVD  Respiratory: coarse BS b/l  Cardiovascular: RRR s1s2, no m/r/g  Gastrointestinal: BS+, soft, NT/ND  GI: (+)willard  Extremities: + le edema  Back: no CVAT b/l  Skin: No rashes, no nevi    LABS:                        8.7    9.57  )-----------( 276      ( 12 Aug 2023 00:30 )             27.1     08-12    137  |  100  |  48<H>  ----------------------------<  227<H>  5.1   |  26  |  2.97<H>    Ca    8.6      12 Aug 2023 00:30  Phos  3.2     08-12  Mg     2.80     08-12    TPro  6.4  /  Alb  2.2<L>  /  TBili  0.2  /  DBili  x   /  AST  17  /  ALT  16  /  AlkPhos  105  08-12    Urine Studies:  Urinalysis Basic - ( 12 Aug 2023 00:30 )    Color: x / Appearance: x / SG: x / pH: x  Gluc: 227 mg/dL / Ketone: x  / Bili: x / Urobili: x   Blood: x / Protein: x / Nitrite: x   Leuk Esterase: x / RBC: x / WBC x   Sq Epi: x / Non Sq Epi: x / Bacteria: x    RADIOLOGY & ADDITIONAL STUDIES:    ACC: [id number] EXAM:  XR CHEST PORTABLE URGENT 1V   ORDERED BY: PATRICIA MILTON     ACC: [id number] EXAM:  XR CHEST PORTABLE URGENT 1V   ORDERED BY: JAMIL SANCHEZ     PROCEDURE DATE:  08/11/2023          INTERPRETATION:  CLINICAL INFORMATION:Central line and orogastric tube   placement.    TIME OF EXAMINATION: August 11 at 11:39 PM and August 12 at 5:14 AM.    EXAM: Portable chest    FINDINGS:  August 11 at 11:39 PM:  Endotracheal tube has its tip above the ines. Enteric tube with   sidehole and tip in the body of the stomach.    New left IJ central line with tip in the SVC. No complicating   pneumothorax. No change in the appearance of the heart, lungs or pleura.    August 12 at 5:14 AM:  Enteric tube has been advanced and the sidehole and tip remain in the   body of the stomach. Endotracheal tube again seen with tip just above the   ines.    Left IJ line no longer seen. Lungs show no effusions or pneumothorax.    COMPARISON: August 11 at 7:29 AM    IMPRESSION:  Endotracheal tube with tip above the ines.  Left IJ central line pre and post removal.    No layering effusion or pneumothorax.    --- End of Report ---      LIZA WOODY MD; Attending Radiologist  This document has been electronically signed. Aug 12 2023 11:47AM   NEPHROLOGY     Patient seen and examined with family at bedside, intubated and sedated, on levo gtt,  in no acute distress.     MEDICATIONS  (STANDING):  albuterol/ipratropium for Nebulization 3 milliLiter(s) Nebulizer every 6 hours  aspirin  chewable 81 milliGRAM(s) Oral daily  atorvastatin 40 milliGRAM(s) Oral at bedtime  cefepime   IVPB 1000 milliGRAM(s) IV Intermittent every 24 hours  chlorhexidine 0.12% Liquid 15 milliLiter(s) Oral Mucosa every 12 hours  chlorhexidine 2% Cloths 1 Application(s) Topical daily  dextrose 5%. 1000 milliLiter(s) (50 mL/Hr) IV Continuous <Continuous>  dextrose 5%. 1000 milliLiter(s) (100 mL/Hr) IV Continuous <Continuous>  dextrose 50% Injectable 12.5 Gram(s) IV Push once  dextrose 50% Injectable 25 Gram(s) IV Push once  dextrose 50% Injectable 25 Gram(s) IV Push once  dextrose 50% Injectable 25 milliLiter(s) IV Push once  glucagon  Injectable 1 milliGRAM(s) IntraMuscular once  heparin   Injectable 5000 Unit(s) SubCutaneous every 8 hours  insulin lispro (ADMELOG) corrective regimen sliding scale   SubCutaneous every 6 hours  insulin NPH human recombinant 3 Unit(s) SubCutaneous every 6 hours  insulin regular  human recombinant 5 Unit(s) IV Push once  norepinephrine Infusion 0.08 MICROgram(s)/kG/Min (4.99 mL/Hr) IV Continuous <Continuous>  propofol Infusion 10 MICROgram(s)/kG/Min (3.99 mL/Hr) IV Continuous <Continuous>  sodium chloride 3%  Inhalation 4 milliLiter(s) Inhalation every 6 hours    VITALS:  T(C): , Max: 38.5 (08-12-23 @ 00:00)  T(F): , Max: 101.3 (08-12-23 @ 00:00)  HR: 92 (08-12-23 @ 14:00)  BP: 122/25 (08-12-23 @ 04:00)  BP(mean): 49 (08-12-23 @ 04:00)  RR: 14 (08-12-23 @ 14:00)  SpO2: 100% (08-12-23 @ 14:00)    I and O's:    08-11 @ 07:01  -  08-12 @ 07:00  --------------------------------------------------------  IN: 1262 mL / OUT: 1572 mL / NET: -310 mL    08-12 @ 07:01  -  08-12 @ 14:44  --------------------------------------------------------  IN: 402.6 mL / OUT: 110 mL / NET: 292.6 mL    PHYSICAL EXAM:  Constitutional: intubated/sedated  HEENT: (+)ETT  Neck: Supple, No JVD  Respiratory: coarse BS b/l  Cardiovascular: RRR s1s2, no m/r/g  Gastrointestinal: BS+, soft, NT/ND  GI: (+)willard  Extremities: + le edema  Back: no CVAT b/l  Skin: No rashes, no nevi    LABS:                        8.7    9.57  )-----------( 276      ( 12 Aug 2023 00:30 )             27.1     08-12    137  |  100  |  48<H>  ----------------------------<  227<H>  5.1   |  26  |  2.97<H>    Ca    8.6      12 Aug 2023 00:30  Phos  3.2     08-12  Mg     2.80     08-12    TPro  6.4  /  Alb  2.2<L>  /  TBili  0.2  /  DBili  x   /  AST  17  /  ALT  16  /  AlkPhos  105  08-12    Urine Studies:  Urinalysis Basic - ( 12 Aug 2023 00:30 )    Color: x / Appearance: x / SG: x / pH: x  Gluc: 227 mg/dL / Ketone: x  / Bili: x / Urobili: x   Blood: x / Protein: x / Nitrite: x   Leuk Esterase: x / RBC: x / WBC x   Sq Epi: x / Non Sq Epi: x / Bacteria: x    RADIOLOGY & ADDITIONAL STUDIES:    ACC: [id number] EXAM:  XR CHEST PORTABLE URGENT 1V   ORDERED BY: PATRICIA MILTON     ACC: [id number] EXAM:  XR CHEST PORTABLE URGENT 1V   ORDERED BY: JAMIL SACNHEZ     PROCEDURE DATE:  08/11/2023          INTERPRETATION:  CLINICAL INFORMATION:Central line and orogastric tube   placement.    TIME OF EXAMINATION: August 11 at 11:39 PM and August 12 at 5:14 AM.    EXAM: Portable chest    FINDINGS:  August 11 at 11:39 PM:  Endotracheal tube has its tip above the ines. Enteric tube with   sidehole and tip in the body of the stomach.    New left IJ central line with tip in the SVC. No complicating   pneumothorax. No change in the appearance of the heart, lungs or pleura.    August 12 at 5:14 AM:  Enteric tube has been advanced and the sidehole and tip remain in the   body of the stomach. Endotracheal tube again seen with tip just above the   ines.    Left IJ line no longer seen. Lungs show no effusions or pneumothorax.    COMPARISON: August 11 at 7:29 AM    IMPRESSION:  Endotracheal tube with tip above the ines.  Left IJ central line pre and post removal.    No layering effusion or pneumothorax.    --- End of Report ---      LIZA WOODY MD; Attending Radiologist  This document has been electronically signed. Aug 12 2023 11:47AM

## 2023-08-12 NOTE — CONSULT NOTE ADULT - SUBJECTIVE AND OBJECTIVE BOX
CHIEF COMPLAINT:  SOB    HISTORY OF PRESENT ILLNESS:  74 y/o F DM on insulin, HTN, CKD,breast CA, respiratory arrest and cardiac arrest (2018), CVA with residual weakness, aspiration PNA h/o trache, PEG, both removed presents with respiratory distress requiring intubation. Had recent admission d/c 7/22/23 for cough and respiratory distress (no intubation) thought to be i/s/o aspiration PNA s/p cefepime course; developed rash in response. Infectious w/u was negative at this time. Was discharged home, daughter and  at bedside providing history.     Reports that she was initially improving with O2 sats in the high 90s on room air, however, then became more lethargic and not herself (baseline mental status AOx3). Also had worsening shortness of breath while laying down and leg swelling. Contacted cardiologist who started her on bumex 1mg daily. Developed low sugars overnight before admission and was given juice with some food, daughter reports no coughing during episode. At around 4-5 AM developed vomiting and coughing, O2 sats dropped to 80s and EMS was called. Denies fevers/chills, dysuria or urinary frequency, chest pain, palpitations. Uses wheelchair at home however family moves her around frequently.     In ED, was on BiPAP with increased WOB and was intubated. Found to have elevated pro-BNP and CO2 of 111 on VBG. CXR with small right pleural effusion, started on vanc in the ED.     Cardiology consulted for cardiac management.  Pt seen on recent admission at [facility].  Family at bedside.      PAST MEDICAL & SURGICAL HISTORY:  Breast CA    Diabetes    Stroke    Cardiac arrest    HTN (hypertension)    H/O mastectomy, bilateral    MEDICATIONS:  aspirin  chewable 81 milliGRAM(s) Oral daily  heparin   Injectable 5000 Unit(s) SubCutaneous every 8 hours  norepinephrine Infusion 0.08 MICROgram(s)/kG/Min IV Continuous <Continuous>    aztreonam  IVPB 500 milliGRAM(s) IV Intermittent every 6 hours  metroNIDAZOLE  IVPB      metroNIDAZOLE  IVPB 500 milliGRAM(s) IV Intermittent every 8 hours    albuterol/ipratropium for Nebulization 3 milliLiter(s) Nebulizer every 6 hours  sodium chloride 3%  Inhalation 4 milliLiter(s) Inhalation every 6 hours    fentaNYL   Infusion. 0.565 MICROgram(s)/kG/Hr IV Continuous <Continuous>  propofol Infusion 10 MICROgram(s)/kG/Min IV Continuous <Continuous>    atorvastatin 40 milliGRAM(s) Oral at bedtime  dextrose 50% Injectable 12.5 Gram(s) IV Push once  dextrose 50% Injectable 25 milliLiter(s) IV Push once  dextrose 50% Injectable 25 Gram(s) IV Push once  dextrose 50% Injectable 25 Gram(s) IV Push once  dextrose Oral Gel 15 Gram(s) Oral once PRN  glucagon  Injectable 1 milliGRAM(s) IntraMuscular once  insulin lispro (ADMELOG) corrective regimen sliding scale   SubCutaneous every 6 hours  insulin regular  human recombinant 5 Unit(s) IV Push once    chlorhexidine 0.12% Liquid 15 milliLiter(s) Oral Mucosa every 12 hours  chlorhexidine 2% Cloths 1 Application(s) Topical daily  dextrose 5%. 1000 milliLiter(s) IV Continuous <Continuous>  dextrose 5%. 1000 milliLiter(s) IV Continuous <Continuous>    FAMILY HISTORY:  No pertinent family history in first degree relatives    SOCIAL HISTORY:    [ ] Non-smoker  [ ] Smoker  [ ] Alcohol    Allergies    isoniazid (Rash)  nafcillin (Unknown)  hydrALAZINE (Rash)  vitamin E (Short breath; Urticaria; Hives)  doxycycline (Rash)  cefepime (Rash)  NIFEdipine (Urticaria; Hives)    Intolerances    REVIEW OF SYSTEMS:	    [ ] All others negative	  [XX] Unable to obtain    PHYSICAL EXAM:  T(C): 36.8 (08-12-23 @ 08:00), Max: 38.5 (08-12-23 @ 00:00)  HR: 71 (08-12-23 @ 10:24) (68 - 111)  BP: 122/25 (08-12-23 @ 04:00) (58/31 - 188/83)  RR: 9 (08-12-23 @ 10:00) (9 - 25)  SpO2: 100% (08-12-23 @ 10:24) (95% - 100%)  Wt(kg): --  I&O's Summary    11 Aug 2023 07:01  -  12 Aug 2023 07:00  --------------------------------------------------------  IN: 1262 mL / OUT: 1572 mL / NET: -310 mL    12 Aug 2023 07:01  -  12 Aug 2023 10:44  --------------------------------------------------------  IN: 73.3 mL / OUT: 0 mL / NET: 73.3 mL    Appearance: NAD, sedated	  HEENT: dry oral mucosa, PERRL, EOMI	  Lymphatic: No lymphadenopathy  Cardiovascular: Normal S1 S2, No JVD, No murmurs, No edema  Respiratory:  Decreased BS, intubated on ventilator	  Psychiatry: A & O x 0, sedated  Gastrointestinal: Soft, Non-tender, + BS, + OGT	  Skin: No rashes, No ecchymoses, No cyanosis	  Extremities: No strength/ROM 2/2 sedation, + BL LE edema  Vascular: Peripheral pulses palpable 2+ bilaterally  +willard    TELEMETRY: SR	    ECG:  < from: Transthoracic Echocardiogram (08.11.23 @ 16:09) >  Patient name: MILANA BALL  YOB: 1950   Age: 73 (F)   MR#: [id number]  Study Date: 8/11/2023  Location: O/PSonographer: PETE Aguilar  Study quality: Technically Fair  Referring Physician: Not Available Doctor, MD  Blood Pressure: 58/31 mmHg  Height: 152 cm  Weight: 75 kg  BSA: 1.7 m2  ------------------------------------------------------------------------  PROCEDURE: Transthoracic echocardiogram with 2-D, M-Mode  and complete spectral and color flow Doppler.  INDICATION: Heart failure, unspecified (I50.9)  ------------------------------------------------------------------------  DIMENSIONS:  Dimensions:     Normal Values:  LA:     2.8 cm    2.0 - 4.0 cm  Ao:     2.9 cm    2.0 - 3.8 cm  SEPTUM: 1.2 cm    0.6 - 1.2 cm  PWT:    1.2 cm    0.6 - 1.1 cm  LVIDd:  3.9 cm    3.0 - 5.6 cm  LVIDs:  2.6 cm    1.8 - 4.0 cm  Derived Variables:  LVMI: 93 g/m2  RWT: 0.61  Fractional short: 33 %  Ejection Fraction (Modified Hammer Rule): 60 %  ------------------------------------------------------------------------  OBSERVATIONS:  Mitral Valve: Mitral annular calcification, otherwise  normal mitral valve. Minimal mitral regurgitation.  Aortic Root: Normal aortic root.  Aortic Valve: Calcified trileaflet aortic valve with normal  opening.  Left Atrium: Normal left atrium.  Left Ventricle: Endocardium not well visualized; grossly  normal left ventricular systolic function. Mild concentric  left ventricular hypertrophy. Mild diastolic dysfunction  (Stage I).  Right Heart:Normal right atrium. The right ventricle is  not well visualized; grossly normal right ventricular  systolic function. Normal tricuspid valve. Minimal  tricuspid regurgitation. Normal pulmonic valve.  Pericardium/PleuraNormal pericardium with no pericardial  effusion. Right pleural effusion.  ------------------------------------------------------------------------  CONCLUSIONS:  1. Mitral annular calcification, otherwise normal mitral  valve. Minimal mitral regurgitation.  2. Mild concentric leftventricular hypertrophy.  3. Endocardium not well visualized; grossly normal left  ventricular systolic function.  4. Mild diastolic dysfunction (Stage I).  5. The right ventricle is not well visualized; grossly  normal right ventricular systolic function.  6. Right pleural effusion.  ------------------------------------------------------------------------  Confirmed on  8/11/2023 - 17:36:17 by Doni Gillis M.D.,  [facility], [facility]  ------------------------------------------------------------------------    < end of copied text >    RADIOLOGY:  < from: CT Head No Cont (08.11.23 @ 13:21) >  ACC: [id number] EXAM:  CT BRAIN   ORDERED BY: JORGE RANDALL     PROCEDURE DATE:  08/11/2023          INTERPRETATION:  INDICATIONS:  AMS    TECHNIQUE:  Serial axial images were obtained from the skull base to the   vertex without intravenous contrast. Sagittal and Coronal reformats were   performed    COMPARISON EXAMINATION: 7/17/2023    FINDINGS:  VENTRICLES AND SULCI: Age-appropriate involutional change  INTRA-AXIAL:  Old left PCA infarct as seen on the prior unchanged.   Microvascular ischemic changes involving the periventricular and   subcortical white matter as seen previously  EXTRA-AXIAL:  No mass or collection is seen.  VISUALIZED SINUSES:  Clear.  VISUALIZED MASTOIDS: Left mastoid sclerosis  CALVARIUM: Infiltrative appearance tothe calvarium may be indicative of   marrow infiltration on the basis of patient's known diagnosis of breast   cancer. MISCELLANEOUS:  None.    IMPRESSION:  No significant interval change compared with 7/17/2023 in   left PCA infarct which is old. Microvascular ischemic changes involving   the periventricular and subcortical white matter as seen   previously.Questionable lesions at the level of the calvarium related to   possible breast CA. Clinical correlation recommended.    --- End of Report ---    < end of copied text >  < from: CT Chest No Cont (08.11.23 @ 13:19) >  ACC: [id number] EXAM:  CT CHEST   ORDERED BY: STORMY AVILA     PROCEDURE DATE:  08/11/2023          INTERPRETATION:  Clinical information: Hypoxic.    CT scan of the chest was obtained without administration of intravenous   contrast.    Few partially calcified and noncalcified lymph nodes are present in the   pretracheal space and the AP window.    Heart is enlarged in size. Calcification of the coronary arteries is   noted. Loop recorder is noted within the subcutaneous tissues of left   anteriorchest wall. No pericardial effusion is noted.    Endotracheal tube is noted in place. Mucous/secretions are noted in the   left mainstem bronchus. Bronchiectasis and tree-in-bud opacities are   noted within both lungs. Some of the dilated bronchi arefilled with   mucus/secretions. 3.2 cm cyst/cavity is noted in the apical segment of   the right upper lobe. Above findings have not significantly changed when   compared to previous exam. Compressive atelectasis is noted involving   portion of the right lower lobe. This is secondary to small to moderate   size right pleural effusion. The right pleural effusion has increased   when compared to previous exam.    Below the diaphragm, visualized portions of the abdomen demonstrate   thickening of the left adrenal gland.    Degenerative changes of the spine are noted.    IMPRESSION: Small to moderate size right pleural effusion has increased   in size when compared to previous exam.    3.2 cm cyst/cavity in the right upper lobe as well as bronchiectasis and   tree-in-bud opacities within both lungs as described above have not   significantly changed when compared to previous exam.    --- End of Report ---    < end of copied text >    OTHER: 	  	  LABS:	 	    CARDIAC MARKERS:                        8.7    9.57  )-----------( 276      ( 12 Aug 2023 00:30 )             27.1     08-12    137  |  100  |  48<H>  ----------------------------<  227<H>  5.1   |  26  |  2.97<H>    Ca    8.6      12 Aug 2023 00:30  Phos  3.2     08-12  Mg     2.80     08-12    TPro  6.4  /  Alb  2.2<L>  /  TBili  0.2  /  DBili  x   /  AST  17  /  ALT  16  /  AlkPhos  105  08-12    proBNP:   Lipid Profile:   HgA1c:   TSH:

## 2023-08-12 NOTE — CONSULT NOTE ADULT - ASSESSMENT
72 y/o F DM on insulin, HTN, CKD,breast CA, respiratory arrest and cardiac arrest (2018), CVA with residual weakness, aspiration PNA h/o trache, PEG, both removed presents with respiratory distress requiring intubation. Had recent admission d/c 7/22/23 for cough and respiratory distress (no intubation) thought to be i/s/o aspiration PNA s/p cefepime course; developed rash in response. Infectious w/u was negative at this time. Was discharged home, daughter and  at bedside providing history. Patient admitted to MICU for higher level of care, intubated & sedated on fentanyl & propofol. While in the MICU, patient was observed to have RUE shaking. Ativan given & EEG started, pending final report. Neurology consulted for further recommendations. Patient unable to offer history at this time. At bedside, daughter &  offer collateral. As per daughter, patient follows with Dr. Garner for stroke management since 2018 and Dr. Poon for tremors. Patient's tremors usually consist of RUE or head shaking, can be stopped by the patient with effort; this has been ongoing for 2-3 years. However, as of the past 2-3 weeks, patient has been experiencing more frequent, more severe RUE shaking intermittently; during these episodes patient is awake and alert. Does not take medications for tremors. No hx of vocal tremors. No hx of seizure, incontinence, tongue bite. For memory loss, patient has been taking Donepezil. Family inquiring about taking alternative supplements instead of Donepezil. At baseline patient wheelchair/bedbound and requires assistance with ADLs.     Impression:   1) Worsening RUE shaking of unclear etiology at this time; follow up EEG report to rule out seizures.   2) Decreased level of consciousness and limited neurological exam due to diffuse cerebral dysfunction in the setting of sedating agents; wean as tolerated    Recommendations     [] F/u EEG report   [] STAT CTH for decline in neuro exam   [] Wean propofol, fentanyl as tolerated   [] If patient has episode of tonic clonic shaking w/ associated vital sign abnormalities, may administer Ativan 2 mg   [] Hold of on antiepileptic agents for now   [] C/w home Donepezil  [] C/w home ASA 81 mg if no contraindications   [] C/w home Atorvastatin 10 mg qhs   [] DVT/GI prophlaxis  [] Neurochecks q4hr   [] BP goals: Normotension   [] PT/OT   [] Diet: NPO w/ tube feeds    [] HgA1C goals < 7.0   [] Upon discharge, patient should follow up with Dr. Bianchi:  [phone] [address] [place] [address]     Patient case discussed with stroke attending, Dr. Bianchi.    Please call with questions: [id number]  72 y/o F DM on insulin, HTN, CKD,breast CA, respiratory arrest and cardiac arrest (2018), CVA with residual weakness, aspiration PNA h/o trache, PEG, both removed presents with respiratory distress requiring intubation. Had recent admission d/c 7/22/23 for cough and respiratory distress (no intubation) thought to be i/s/o aspiration PNA s/p cefepime course; developed rash in response. Infectious w/u was negative at this time. Was discharged home, daughter and  at bedside providing history. Patient admitted to MICU for higher level of care, intubated & sedated on fentanyl & propofol. While in the MICU, patient was observed to have RUE shaking. Ativan given & EEG started, pending final report. Neurology consulted for further recommendations. Patient unable to offer history at this time. At bedside, daughter &  offer collateral. As per daughter, patient follows with Dr. Garner for stroke management since 2018 and Dr. Poon for tremors. Patient's tremors usually consist of RUE or head shaking, can be stopped by the patient with effort; this has been ongoing for 2-3 years. However, as of the past 2-3 weeks, patient has been experiencing more frequent, more severe RUE shaking intermittently; during these episodes patient is awake and alert. Does not take medications for tremors. No hx of vocal tremors. No hx of seizure, incontinence, tongue bite. For memory loss, patient has been taking Donepezil. Family inquiring about taking alternative supplements instead of Donepezil. At baseline patient wheelchair/bedbound and requires assistance with ADLs. CTH obtained 8/11, no interval change noted.     Impression:   1) Worsening RUE shaking of unclear etiology at this time; follow up EEG report to rule out seizures.   2) Decreased level of consciousness and limited neurological exam due to diffuse cerebral dysfunction in the setting of sedating agents; wean as tolerated    Recommendations     [] F/u EEG report   [] STAT CTH for decline in neuro exam   [] Wean propofol, fentanyl as tolerated   [] If patient has episode of tonic clonic shaking w/ associated vital sign abnormalities, may administer Ativan 2 mg   [] Hold of on antiepileptic agents for now   [] C/w home Donepezil  [] C/w home ASA 81 mg if no contraindications   [] C/w home Atorvastatin 10 mg qhs   [] DVT/GI prophlaxis  [] Neurochecks q4hr   [] BP goals: Normotension   [] PT/OT   [] Diet: NPO w/ tube feeds    [] HgA1C goals < 7.0   [] Upon discharge, patient should follow up with Dr. Bianchi:  [phone] [address] [facility] [place] [address]     Patient case discussed with stroke attending, Dr. Bianchi.    Please call with questions: [phone]

## 2023-08-12 NOTE — DIETITIAN INITIAL EVALUATION ADULT - PERTINENT LABORATORY DATA
08-12    137  |  100  |  48<H>  ----------------------------<  227<H>  5.1   |  26  |  2.97<H>    Ca    8.6      12 Aug 2023 00:30  Phos  3.2     08-12  Mg     2.80     08-12    TPro  6.4  /  Alb  2.2<L>  /  TBili  0.2  /  DBili  x   /  AST  17  /  ALT  16  /  AlkPhos  105  08-12  POCT Blood Glucose.: 225 mg/dL (08-12-23 @ 11:51)  A1C with Estimated Average Glucose Result: 7.1 % (07-20-23 @ 10:00)

## 2023-08-12 NOTE — EEG REPORT - NS EEG TEXT BOX
MILANA BALL [id number]     Study Date: 	08-11-23 1850	08-12-23 0800  Duration x Hours: 13 hours  --------------------------------------------------------------------------------------------------  History:  CC/ HPI Patient is a 73y old  Female who presents with a chief complaint of Respiratory distress (12 Aug 2023 10:42)    MEDICATIONS  (STANDING):  albuterol/ipratropium for Nebulization 3 milliLiter(s) Nebulizer every 6 hours  aspirin  chewable 81 milliGRAM(s) Oral daily  atorvastatin 40 milliGRAM(s) Oral at bedtime  buMETAnide Injectable 2 milliGRAM(s) IV Push once  cefepime   IVPB 1000 milliGRAM(s) IV Intermittent every 24 hours  chlorhexidine 0.12% Liquid 15 milliLiter(s) Oral Mucosa every 12 hours  chlorhexidine 2% Cloths 1 Application(s) Topical daily  dextrose 5%. 1000 milliLiter(s) (50 mL/Hr) IV Continuous <Continuous>  dextrose 5%. 1000 milliLiter(s) (100 mL/Hr) IV Continuous <Continuous>  dextrose 50% Injectable 12.5 Gram(s) IV Push once  dextrose 50% Injectable 25 Gram(s) IV Push once  dextrose 50% Injectable 25 Gram(s) IV Push once  dextrose 50% Injectable 25 milliLiter(s) IV Push once  glucagon  Injectable 1 milliGRAM(s) IntraMuscular once  heparin   Injectable 5000 Unit(s) SubCutaneous every 8 hours  insulin lispro (ADMELOG) corrective regimen sliding scale   SubCutaneous every 6 hours  insulin NPH human recombinant 3 Unit(s) SubCutaneous every 6 hours  insulin regular  human recombinant 5 Unit(s) IV Push once  norepinephrine Infusion 0.08 MICROgram(s)/kG/Min (4.99 mL/Hr) IV Continuous <Continuous>  propofol Infusion 10 MICROgram(s)/kG/Min (3.99 mL/Hr) IV Continuous <Continuous>  sodium chloride 3%  Inhalation 4 milliLiter(s) Inhalation every 6 hours    --------------------------------------------------------------------------------------------------  Study Interpretation:    [[[Abbreviation Key:  PDR=alpha rhythm/posterior dominant rhythm. A-P=anterior posterior.  Amplitude: ‘very low’:<20; ‘low’:20-49; ‘medium’:; ‘high’:>150uV.  Persistence for periodic/rhythmic patterns (% of epoch) ‘rare’:<1%; ‘occasional’:1-10%; ‘frequent’:10-50%; ‘abundant’:50-90%; ‘continuous’:>90%.  Persistence for sporadic discharges: ‘rare’:<1/hr; ‘occasional’:1/min-1/hr; ‘frequent’:>1/min; ‘abundant’:>1/10 sec.  RPP=rhythmic and periodic patterns; GRDA=generalized rhythmic delta activity; FIRDA=frontal intermittent GRDA; LRDA=lateralized rhythmic delta activity; TIRDA=temporal intermittent rhythmic delta activity;  LPD=PLED=lateralized periodic discharges; GPD=generalized periodic discharges; BIPDs =bilateral independent periodic discharges; Mf=multifocal; SIRPDs=stimulus induced rhythmic, periodic, or ictal appearing discharges; BIRDs=brief potentially ictal rhythmic discharges >4 Hz, lasting .5-10s; PFA (paroxysmal bursts >13 Hz or =8 Hz <10s).  Modifiers: +F=with fast component; +S=with spike component; +R=with rhythmic component.  S-B=burst suppression pattern.  Max=maximal. N1-drowsy; N2-stage II sleep; N3-slow wave sleep. SSS/BETS=small sharp spikes/benign epileptiform transients of sleep. HV=hyperventilation; PS=photic stimulation]]]    Daily EEG Visual Analysis    FINDINGS:      Background:  Continuity: discontinuous  Symmetry: symmetric  PDR: non.  Reactivity: absent  Voltage: normal  Anterior Posterior Gradient: Absent  Other background findings: none  Breach: absent    Background Slowing:  Generalized slowing: Diffuse polymorphic delta/theta slowing   Focal slowing: none was present.    State Changes:   -Absent    Sporadic Epileptiform Discharges:    None    Rhythmic and Periodic Patterns (RPPs):  None     Electrographic and Electroclinical seizures:  None    Other Clinical Events:  @1907 - reported push button for shaking. (video not clear). No EEG correlate seen.     Activation Procedures:   -Hyperventilation was not performed.    -Photic stimulation was not performed.    Artifacts:  Intermittent myogenic and movement artifacts were noted.    ECG:  The heart rate on single channel ECG was predominantly between 100 to 110 BPM.    EEG Classification / Summary:  Abnormal  EEG in lethargic patient.  - Severe background slowing with discontinuity    Clinical Impression:  - Severe diffuse non-specific cerebral dysfunction  - One push button event for shaking with no EEG correlate.   - There were no epileptiform abnormalities or seizures recorded.      This is fellow preliminary read, pending attending review.   -------------------------------------------------------------------------------------------------------  Health system EEG Reading Room Ph#: [phone]  Epilepsy Answering Service after 5PM and before 8:30AM: Ph#: [phone] MILANA BALL [id number]     Study Date: 	08-11-23 1850	08-12-23 0800  Duration x Hours: 13 hours  --------------------------------------------------------------------------------------------------  History:  CC/ HPI Patient is a 73y old  Female who presents with a chief complaint of Respiratory distress (12 Aug 2023 10:42)    MEDICATIONS  (STANDING):  albuterol/ipratropium for Nebulization 3 milliLiter(s) Nebulizer every 6 hours  aspirin  chewable 81 milliGRAM(s) Oral daily  atorvastatin 40 milliGRAM(s) Oral at bedtime  buMETAnide Injectable 2 milliGRAM(s) IV Push once  cefepime   IVPB 1000 milliGRAM(s) IV Intermittent every 24 hours  chlorhexidine 0.12% Liquid 15 milliLiter(s) Oral Mucosa every 12 hours  chlorhexidine 2% Cloths 1 Application(s) Topical daily  dextrose 5%. 1000 milliLiter(s) (50 mL/Hr) IV Continuous <Continuous>  dextrose 5%. 1000 milliLiter(s) (100 mL/Hr) IV Continuous <Continuous>  dextrose 50% Injectable 12.5 Gram(s) IV Push once  dextrose 50% Injectable 25 Gram(s) IV Push once  dextrose 50% Injectable 25 Gram(s) IV Push once  dextrose 50% Injectable 25 milliLiter(s) IV Push once  glucagon  Injectable 1 milliGRAM(s) IntraMuscular once  heparin   Injectable 5000 Unit(s) SubCutaneous every 8 hours  insulin lispro (ADMELOG) corrective regimen sliding scale   SubCutaneous every 6 hours  insulin NPH human recombinant 3 Unit(s) SubCutaneous every 6 hours  insulin regular  human recombinant 5 Unit(s) IV Push once  norepinephrine Infusion 0.08 MICROgram(s)/kG/Min (4.99 mL/Hr) IV Continuous <Continuous>  propofol Infusion 10 MICROgram(s)/kG/Min (3.99 mL/Hr) IV Continuous <Continuous>  sodium chloride 3%  Inhalation 4 milliLiter(s) Inhalation every 6 hours    --------------------------------------------------------------------------------------------------  Study Interpretation:    [[[Abbreviation Key:  PDR=alpha rhythm/posterior dominant rhythm. A-P=anterior posterior.  Amplitude: ‘very low’:<20; ‘low’:20-49; ‘medium’:; ‘high’:>150uV.  Persistence for periodic/rhythmic patterns (% of epoch) ‘rare’:<1%; ‘occasional’:1-10%; ‘frequent’:10-50%; ‘abundant’:50-90%; ‘continuous’:>90%.  Persistence for sporadic discharges: ‘rare’:<1/hr; ‘occasional’:1/min-1/hr; ‘frequent’:>1/min; ‘abundant’:>1/10 sec.  RPP=rhythmic and periodic patterns; GRDA=generalized rhythmic delta activity; FIRDA=frontal intermittent GRDA; LRDA=lateralized rhythmic delta activity; TIRDA=temporal intermittent rhythmic delta activity;  LPD=PLED=lateralized periodic discharges; GPD=generalized periodic discharges; BIPDs =bilateral independent periodic discharges; Mf=multifocal; SIRPDs=stimulus induced rhythmic, periodic, or ictal appearing discharges; BIRDs=brief potentially ictal rhythmic discharges >4 Hz, lasting .5-10s; PFA (paroxysmal bursts >13 Hz or =8 Hz <10s).  Modifiers: +F=with fast component; +S=with spike component; +R=with rhythmic component.  S-B=burst suppression pattern.  Max=maximal. N1-drowsy; N2-stage II sleep; N3-slow wave sleep. SSS/BETS=small sharp spikes/benign epileptiform transients of sleep. HV=hyperventilation; PS=photic stimulation]]]    Daily EEG Visual Analysis    FINDINGS:      Background:  Continuity: discontinuous  Symmetry: symmetric  PDR: non.  Reactivity: absent  Voltage: normal  Anterior Posterior Gradient: Absent  Other background findings: none  Breach: absent    Background Slowing:  Generalized slowing: Diffuse polymorphic delta/theta slowing   Focal slowing: none was present.    State Changes:   -Absent    Sporadic Epileptiform Discharges:    None    Rhythmic and Periodic Patterns (RPPs):  None     Electrographic and Electroclinical seizures:  None    Other Clinical Events:  @1907 - reported push button for shaking. (video not clear). No EEG correlate seen.     Activation Procedures:   -Hyperventilation was not performed.    -Photic stimulation was not performed.    Artifacts:  Intermittent myogenic and movement artifacts were noted.    ECG:  The heart rate on single channel ECG was predominantly between 100 to 110 BPM.    EEG Classification / Summary:  Abnormal  EEG in lethargic patient.  - Severe background slowing with discontinuity    Clinical Impression:  - Severe diffuse non-specific cerebral dysfunction  - One push button event for shaking with no EEG correlate.   - There were no epileptiform abnormalities or seizures recorded.      -------------------------------------------------------------------------------------------------------  [facility] EEG Reading Room Ph#: [phone]  Epilepsy Answering Service after 5PM and before 8:30AM: Ph#: [phone]

## 2023-08-12 NOTE — CONSULT NOTE ADULT - ASSESSMENT
72 y/o F DM on insulin, HTN, CKD,breast CA, respiratory arrest and cardiac arrest (2018), CVA with residual weakness, aspiration PNA h/o trache, PEG, both removed presents with respiratory distress requiring intubation.

## 2023-08-12 NOTE — DIETITIAN INITIAL EVALUATION ADULT - NSFNSGIIOFT_GEN_A_CORE
08-11-23 @ 07:01  -  08-12-23 @ 07:00  --------------------------------------------------------  OUT:  Total OUT: 0 mL    Total NET: 50 mL      08-12-23 @ 07:01  -  08-12-23 @ 15:01  --------------------------------------------------------  OUT:  Total OUT: 0 mL    Total NET: 180 mL

## 2023-08-12 NOTE — PROGRESS NOTE ADULT - ATTENDING COMMENTS
Acute hypoxemic and hypercapnic respiratory failure due to Staphylococcus aureus cavitary pneumonia + acute decompensated diastolic heart failure. Intubated 8/11. Continue lung protective ventilation. Will start spontaneous breathing trials after weaning off sedation. Hypercapnia improved on repeat ABG's. Continue Vancomycin pending sensitivities for S. aureus. D/c aztreonam and metronidazole and start cefepime. Has history of allergies to PCN. Airway clearance therapy with albuterol, ipratropium, and hypertonic saline nebs as well as IPV.    ADHF and RUSS on CKD likely due to ATN. Diuresis with furosemide, but with minimal urine output. Did not respond to bumetanide 2 mg. Will try bumetanide 4 mg and may require infusion. Strict I/O's, close monitoring of kidney function and lytes. Hyperkalemia improved. Follow-up renal.    Possible seizure episode overnight s/p lorazepam. D/c fentanyl and slowly titrate down propofol. Monitor mental status. Continue aspirin and statin for h/o CVA.    Nepro tube feeds as tolerates. DM2, a1c 7.1, continue NPH + insulin coverage scale.    Overall prognosis guarded, discussed with  at bedside.    CC time spent: 35 min

## 2023-08-12 NOTE — PROCEDURE NOTE - NSPOSTCAREGUIDE_GEN_A_CORE
Verbal/written post procedure instructions were given to patient/caregiver/Instructed patient/caregiver regarding signs and symptoms of infection/Care for catheter as per unit/ICU protocols Care for catheter as per unit/ICU protocols

## 2023-08-12 NOTE — DIETITIAN INITIAL EVALUATION ADULT - PERTINENT MEDS FT
MEDICATIONS  (STANDING):  albuterol/ipratropium for Nebulization 3 milliLiter(s) Nebulizer every 6 hours  aspirin  chewable 81 milliGRAM(s) Oral daily  atorvastatin 40 milliGRAM(s) Oral at bedtime  cefepime   IVPB 1000 milliGRAM(s) IV Intermittent every 24 hours  chlorhexidine 0.12% Liquid 15 milliLiter(s) Oral Mucosa every 12 hours  chlorhexidine 2% Cloths 1 Application(s) Topical daily  dextrose 5%. 1000 milliLiter(s) (100 mL/Hr) IV Continuous <Continuous>  dextrose 5%. 1000 milliLiter(s) (50 mL/Hr) IV Continuous <Continuous>  dextrose 50% Injectable 12.5 Gram(s) IV Push once  dextrose 50% Injectable 25 Gram(s) IV Push once  dextrose 50% Injectable 25 Gram(s) IV Push once  dextrose 50% Injectable 25 milliLiter(s) IV Push once  glucagon  Injectable 1 milliGRAM(s) IntraMuscular once  heparin   Injectable 5000 Unit(s) SubCutaneous every 8 hours  insulin lispro (ADMELOG) corrective regimen sliding scale   SubCutaneous every 6 hours  insulin NPH human recombinant 3 Unit(s) SubCutaneous every 6 hours  insulin regular  human recombinant 5 Unit(s) IV Push once  norepinephrine Infusion 0.08 MICROgram(s)/kG/Min (4.99 mL/Hr) IV Continuous <Continuous>  propofol Infusion 10 MICROgram(s)/kG/Min (3.99 mL/Hr) IV Continuous <Continuous>  sodium chloride 3%  Inhalation 4 milliLiter(s) Inhalation every 6 hours    MEDICATIONS  (PRN):  dextrose Oral Gel 15 Gram(s) Oral once PRN Blood Glucose LESS THAN 70 milliGRAM(s)/deciliter

## 2023-08-12 NOTE — CONSULT NOTE ADULT - SUBJECTIVE AND OBJECTIVE BOX
HPI:  72 y/o F DM on insulin, HTN, CKD,breast CA, respiratory arrest and cardiac arrest (2018), CVA with residual weakness, aspiration PNA h/o trache, PEG, both removed presents with respiratory distress requiring intubation. Had recent admission d/c 7/22/23 for cough and respiratory distress (no intubation) thought to be i/s/o aspiration PNA s/p cefepime course; developed rash in response. Infectious w/u was negative at this time. Was discharged home, daughter and  at bedside providing history.     Reports that she was initially improving with O2 sats in the high 90s on room air, however, then became more lethargic and not herself (baseline mental status AOx3). Also had worsening shortness of breath while laying down and leg swelling. Contacted cardiologist who started her on bumex 1mg daily. Developed low sugars overnight before admission and was given juice with some food, daughter reports no coughing during episode. At around 4-5 AM developed vomiting and coughing, O2 sats dropped to 80s and EMS was called. Denies fevers/chills, dysuria or urinary frequency, chest pain, palpitations. Uses wheelchair at home however family moves her around frequently.     In ED, was on BiPAP with increased WOB and was intubated. Found to have elevated pro-BNP and CO2 of 111 on VBG. CXR with small right pleural effusion, started on vanc in the ED.  (11 Aug 2023 09:08)    Patient admitted to MICU for higher level of care, intubated & sedated on fentanyl & propofol. While in the MICU, patient was observed to have RUE shaking. Ativan given & EEG started, pending final report. Neurology consulted for further recommendations. Patient unable to offer history at this time. At bedside, daughter &  offer collateral. As per daughter, patient follows with Dr. Garner for stroke management since 2018 and Dr. Poon for tremors. Patient's tremors usually consist of RUE or head shaking, can be stopped by the patient with effort; this has been ongoing for 2-3 years. However, as of the past 2-3 weeks, patient has been experiencing more frequent, more severe RUE shaking intermittently; during these episodes patient is awake and alert. Does not take medications for tremors. No hx of vocal tremors. No hx of seizure, incontinence, tongue bite. For memory loss, patient has been taking Donepezil. Family inquiring about taking alternative supplements instead of Donepezil. At baseline patient wheelchair/bedbound and requires assistance with ADLs.     (Stroke only)  NIHSS: 28  MRS: 5    REVIEW OF SYSTEMS    A 10-system ROS was performed and is negative except for those items noted above and/or in the HPI.    PAST MEDICAL & SURGICAL HISTORY:  Breast CA      Diabetes      Stroke      Cardiac arrest      HTN (hypertension)      H/O mastectomy, bilateral        FAMILY HISTORY:  No pertinent family history in first degree relatives      SOCIAL HISTORY:   T/E/D:   Occupation:   Lives with:     MEDICATIONS (HOME):  Home Medications:  aspirin 81 mg oral delayed release tablet: 1 tab(s) orally once a day (12 Jul 2023 20:09)  atorvastatin 10 mg oral tablet: 1 tab(s) orally once a day (12 Jul 2023 20:13)  budesonide 0.5 mg/2 mL inhalation suspension: 2 milliliter(s) by nebulizer once a day (12 Jul 2023 20:13)  carvedilol 12.5 mg oral tablet: 3 tab(s) orally 2 times a day (12 Jul 2023 20:13)  cholecalciferol 50 mcg (2000 intl units) oral tablet: 1 tab(s) orally once a day (12 Jul 2023 20:13)  donepezil 10 mg oral tablet: 1 tab(s) orally once a day (at bedtime) (12 Jul 2023 20:13)  ipratropium 42 mcg/inh (0.06%) nasal spray: 1 spray(s) intranasally 2 times a day (12 Jul 2023 20:13)  Januvia 25 mg oral tablet: 1 tab(s) orally once a day (12 Jul 2023 20:13)  letrozole 2.5 mg oral tablet: 1 tab(s) orally once a day (12 Jul 2023 20:13)  PreserVision AREDS 2 oral capsule: 1 cap(s) orally once a day (12 Jul 2023 20:09)  sodium chloride 0.9% inhalation solution: 3 milliliter(s) inhaled every 6 hours as needed for  shortness of breath and/or wheezing (22 Jul 2023 12:09)    MEDICATIONS  (STANDING):  albuterol/ipratropium for Nebulization 3 milliLiter(s) Nebulizer every 6 hours  aspirin  chewable 81 milliGRAM(s) Oral daily  atorvastatin 40 milliGRAM(s) Oral at bedtime  aztreonam  IVPB 500 milliGRAM(s) IV Intermittent every 6 hours  chlorhexidine 0.12% Liquid 15 milliLiter(s) Oral Mucosa every 12 hours  chlorhexidine 2% Cloths 1 Application(s) Topical daily  dextrose 5%. 1000 milliLiter(s) (100 mL/Hr) IV Continuous <Continuous>  dextrose 5%. 1000 milliLiter(s) (50 mL/Hr) IV Continuous <Continuous>  dextrose 50% Injectable 25 Gram(s) IV Push once  dextrose 50% Injectable 25 Gram(s) IV Push once  dextrose 50% Injectable 12.5 Gram(s) IV Push once  dextrose 50% Injectable 25 milliLiter(s) IV Push once  fentaNYL   Infusion. 0.565 MICROgram(s)/kG/Hr (3.76 mL/Hr) IV Continuous <Continuous>  glucagon  Injectable 1 milliGRAM(s) IntraMuscular once  heparin   Injectable 5000 Unit(s) SubCutaneous every 8 hours  insulin lispro (ADMELOG) corrective regimen sliding scale   SubCutaneous every 6 hours  insulin regular  human recombinant 5 Unit(s) IV Push once  metroNIDAZOLE  IVPB      metroNIDAZOLE  IVPB 500 milliGRAM(s) IV Intermittent every 8 hours  norepinephrine Infusion 0.08 MICROgram(s)/kG/Min (4.99 mL/Hr) IV Continuous <Continuous>  propofol Infusion 10 MICROgram(s)/kG/Min (3.99 mL/Hr) IV Continuous <Continuous>  sodium chloride 3%  Inhalation 4 milliLiter(s) Inhalation every 12 hours    MEDICATIONS  (PRN):  dextrose Oral Gel 15 Gram(s) Oral once PRN Blood Glucose LESS THAN 70 milliGRAM(s)/deciliter    ALLERGIES/INTOLERANCES:  Allergies  isoniazid (Rash)  nafcillin (Unknown)  hydrALAZINE (Rash)  vitamin E (Short breath; Urticaria; Hives)  doxycycline (Rash)  cefepime (Rash)  NIFEdipine (Urticaria; Hives)    Intolerances    VITALS & EXAMINATION:  Vital Signs Last 24 Hrs  T(C): 37.6 (12 Aug 2023 04:00), Max: 38.5 (12 Aug 2023 00:00)  T(F): 99.6 (12 Aug 2023 04:00), Max: 101.3 (12 Aug 2023 00:00)  HR: 76 (12 Aug 2023 07:22) (66 - 111)  BP: 122/25 (12 Aug 2023 04:00) (58/31 - 226/107)  BP(mean): 49 (12 Aug 2023 04:00) (32 - 158)  RR: 14 (12 Aug 2023 07:00) (13 - 25)  SpO2: 100% (12 Aug 2023 07:22) (95% - 100%)    Parameters below as of 12 Aug 2023 05:00  Patient On (Oxygen Delivery Method): ventilator    O2 Concentration (%): 40    General:  Constitutional: Female, appears stated age, in no apparent distress including pain  Head: Normocephalic & Atraumatic.  Respiratory: Breathing comfortably.    Neurological:  MS: Eyes closed, do not open to noxious stimuli. Sedated on propofol, fentanyl. Not following commands.     Language: Intubated. No verbal output, spontaneous or otherwise.     CNs: PERRL (R = 2mm, L = 2mm; minimally reactive). No blink to threat b/l. Primary gaze, no movement w/ Doll's head maneuver. No facial asymmetry b/l, full eye closure strength b/l. Corneal sluggishly intact b/l.    Motor: Normal muscle bulk & tone. No noticeable tremor. No movement of UE or LE b/l, no withdrawal to noxious stimuli.     Sensation: No grimace to noxious stimuli b/l throughout.     Reflexes:              Biceps(C5)       BR(C6)     Triceps(C7)               Patellar(L4)    Achilles(S1)      R	              1	          1	             1		  1		    1		        L	              1	          1	             1		  1		    1		          Coordination: Unable to assess    Gait: Deferred.         LABORATORY:  CBC                       8.7    9.57  )-----------( 276      ( 12 Aug 2023 00:30 )             27.1     Chem 08-12    137  |  100  |  48<H>  ----------------------------<  227<H>  5.1   |  26  |  2.97<H>    Ca    8.6      12 Aug 2023 00:30  Phos  3.2     08-12  Mg     2.80     08-12    TPro  6.4  /  Alb  2.2<L>  /  TBili  0.2  /  DBili  x   /  AST  17  /  ALT  16  /  AlkPhos  105  08-12    LFTs LIVER FUNCTIONS - ( 12 Aug 2023 00:30 )  Alb: 2.2 g/dL / Pro: 6.4 g/dL / ALK PHOS: 105 U/L / ALT: 16 U/L / AST: 17 U/L / GGT: x           Coagulopathy PT/INR - ( 12 Aug 2023 00:30 )   PT: 13.4 sec;   INR: 1.19 ratio         PTT - ( 12 Aug 2023 00:30 )  PTT:22.9 sec  Lipid Panel   A1c   Cardiac enzymes     U/A Urinalysis Basic - ( 12 Aug 2023 00:30 )    Color: x / Appearance: x / SG: x / pH: x  Gluc: 227 mg/dL / Ketone: x  / Bili: x / Urobili: x   Blood: x / Protein: x / Nitrite: x   Leuk Esterase: x / RBC: x / WBC x   Sq Epi: x / Non Sq Epi: x / Bacteria: x      CSF  Immunological  Other    STUDIES & IMAGING:  Studies (EKG, EEG, EMG, etc):     Radiology (XR, CT, MR, U/S, TTE/STEPHANIE): HPI:  72 y/o F DM on insulin, HTN, CKD,breast CA, respiratory arrest and cardiac arrest (2018), CVA with residual weakness, aspiration PNA h/o trache, PEG, both removed presents with respiratory distress requiring intubation. Had recent admission d/c 7/22/23 for cough and respiratory distress (no intubation) thought to be i/s/o aspiration PNA s/p cefepime course; developed rash in response. Infectious w/u was negative at this time. Was discharged home, daughter and  at bedside providing history.     Reports that she was initially improving with O2 sats in the high 90s on room air, however, then became more lethargic and not herself (baseline mental status AOx3). Also had worsening shortness of breath while laying down and leg swelling. Contacted cardiologist who started her on bumex 1mg daily. Developed low sugars overnight before admission and was given juice with some food, daughter reports no coughing during episode. At around 4-5 AM developed vomiting and coughing, O2 sats dropped to 80s and EMS was called. Denies fevers/chills, dysuria or urinary frequency, chest pain, palpitations. Uses wheelchair at home however family moves her around frequently.     In ED, was on BiPAP with increased WOB and was intubated. Found to have elevated pro-BNP and CO2 of 111 on VBG. CXR with small right pleural effusion, started on vanc in the ED.  (11 Aug 2023 09:08)    Patient admitted to MICU for higher level of care, intubated & sedated on fentanyl & propofol. While in the MICU, patient was observed to have RUE shaking. Ativan given & EEG started, pending final report. Neurology consulted for further recommendations. Patient unable to offer history at this time. At bedside, daughter &  offer collateral. As per daughter, patient follows with Dr. Garner for stroke management since 2018 and Dr. Poon for tremors. Patient's tremors usually consist of RUE or head shaking, can be stopped by the patient with effort; this has been ongoing for 2-3 years. However, as of the past 2-3 weeks, patient has been experiencing more frequent, more severe RUE shaking intermittently; during these episodes patient is awake and alert. Does not take medications for tremors. No hx of vocal tremors. No hx of seizure, incontinence, tongue bite. For memory loss, patient has been taking Donepezil. Family inquiring about taking alternative supplements instead of Donepezil. At baseline patient wheelchair/bedbound and requires assistance with ADLs.     (Stroke only)  NIHSS: 28  MRS: 5    REVIEW OF SYSTEMS    A 10-system ROS was performed and is negative except for those items noted above and/or in the HPI.    PAST MEDICAL & SURGICAL HISTORY:  Breast CA      Diabetes      Stroke      Cardiac arrest      HTN (hypertension)      H/O mastectomy, bilateral        FAMILY HISTORY:  No pertinent family history in first degree relatives      SOCIAL HISTORY:   T/E/D:   Occupation:   Lives with:     MEDICATIONS (HOME):  Home Medications:  aspirin 81 mg oral delayed release tablet: 1 tab(s) orally once a day (12 Jul 2023 20:09)  atorvastatin 10 mg oral tablet: 1 tab(s) orally once a day (12 Jul 2023 20:13)  budesonide 0.5 mg/2 mL inhalation suspension: 2 milliliter(s) by nebulizer once a day (12 Jul 2023 20:13)  carvedilol 12.5 mg oral tablet: 3 tab(s) orally 2 times a day (12 Jul 2023 20:13)  cholecalciferol 50 mcg (2000 intl units) oral tablet: 1 tab(s) orally once a day (12 Jul 2023 20:13)  donepezil 10 mg oral tablet: 1 tab(s) orally once a day (at bedtime) (12 Jul 2023 20:13)  ipratropium 42 mcg/inh (0.06%) nasal spray: 1 spray(s) intranasally 2 times a day (12 Jul 2023 20:13)  Januvia 25 mg oral tablet: 1 tab(s) orally once a day (12 Jul 2023 20:13)  letrozole 2.5 mg oral tablet: 1 tab(s) orally once a day (12 Jul 2023 20:13)  PreserVision AREDS 2 oral capsule: 1 cap(s) orally once a day (12 Jul 2023 20:09)  sodium chloride 0.9% inhalation solution: 3 milliliter(s) inhaled every 6 hours as needed for  shortness of breath and/or wheezing (22 Jul 2023 12:09)    MEDICATIONS  (STANDING):  albuterol/ipratropium for Nebulization 3 milliLiter(s) Nebulizer every 6 hours  aspirin  chewable 81 milliGRAM(s) Oral daily  atorvastatin 40 milliGRAM(s) Oral at bedtime  aztreonam  IVPB 500 milliGRAM(s) IV Intermittent every 6 hours  chlorhexidine 0.12% Liquid 15 milliLiter(s) Oral Mucosa every 12 hours  chlorhexidine 2% Cloths 1 Application(s) Topical daily  dextrose 5%. 1000 milliLiter(s) (100 mL/Hr) IV Continuous <Continuous>  dextrose 5%. 1000 milliLiter(s) (50 mL/Hr) IV Continuous <Continuous>  dextrose 50% Injectable 25 Gram(s) IV Push once  dextrose 50% Injectable 25 Gram(s) IV Push once  dextrose 50% Injectable 12.5 Gram(s) IV Push once  dextrose 50% Injectable 25 milliLiter(s) IV Push once  fentaNYL   Infusion. 0.565 MICROgram(s)/kG/Hr (3.76 mL/Hr) IV Continuous <Continuous>  glucagon  Injectable 1 milliGRAM(s) IntraMuscular once  heparin   Injectable 5000 Unit(s) SubCutaneous every 8 hours  insulin lispro (ADMELOG) corrective regimen sliding scale   SubCutaneous every 6 hours  insulin regular  human recombinant 5 Unit(s) IV Push once  metroNIDAZOLE  IVPB      metroNIDAZOLE  IVPB 500 milliGRAM(s) IV Intermittent every 8 hours  norepinephrine Infusion 0.08 MICROgram(s)/kG/Min (4.99 mL/Hr) IV Continuous <Continuous>  propofol Infusion 10 MICROgram(s)/kG/Min (3.99 mL/Hr) IV Continuous <Continuous>  sodium chloride 3%  Inhalation 4 milliLiter(s) Inhalation every 12 hours    MEDICATIONS  (PRN):  dextrose Oral Gel 15 Gram(s) Oral once PRN Blood Glucose LESS THAN 70 milliGRAM(s)/deciliter    ALLERGIES/INTOLERANCES:  Allergies  isoniazid (Rash)  nafcillin (Unknown)  hydrALAZINE (Rash)  vitamin E (Short breath; Urticaria; Hives)  doxycycline (Rash)  cefepime (Rash)  NIFEdipine (Urticaria; Hives)    Intolerances    VITALS & EXAMINATION:  Vital Signs Last 24 Hrs  T(C): 37.6 (12 Aug 2023 04:00), Max: 38.5 (12 Aug 2023 00:00)  T(F): 99.6 (12 Aug 2023 04:00), Max: 101.3 (12 Aug 2023 00:00)  HR: 76 (12 Aug 2023 07:22) (66 - 111)  BP: 122/25 (12 Aug 2023 04:00) (58/31 - 226/107)  BP(mean): 49 (12 Aug 2023 04:00) (32 - 158)  RR: 14 (12 Aug 2023 07:00) (13 - 25)  SpO2: 100% (12 Aug 2023 07:22) (95% - 100%)    Parameters below as of 12 Aug 2023 05:00  Patient On (Oxygen Delivery Method): ventilator    O2 Concentration (%): 40    General:  Constitutional: Female, appears stated age, in no apparent distress including pain  Head: Normocephalic & Atraumatic.  Respiratory: Breathing comfortably.    Neurological:  MS: Eyes closed, do not open to noxious stimuli. Sedated on propofol, fentanyl. Not following commands.     Language: Intubated. No verbal output, spontaneous or otherwise.     CNs: PERRL (R = 2mm, L = 2mm; minimally reactive). No blink to threat b/l. Primary gaze, no movement w/ Doll's head maneuver. No facial asymmetry b/l, full eye closure strength b/l. Corneal sluggishly intact b/l.    Motor: Normal muscle bulk & tone. No noticeable tremor. No movement of UE or LE b/l, no withdrawal to noxious stimuli.     Sensation: No grimace to noxious stimuli b/l throughout.     Reflexes:              Biceps(C5)       BR(C6)     Triceps(C7)               Patellar(L4)    Achilles(S1)      R	              1	          1	             1		  1		    1		        L	              1	          1	             1		  1		    1		          Coordination: Unable to assess    Gait: Deferred.         LABORATORY:  CBC                       8.7    9.57  )-----------( 276      ( 12 Aug 2023 00:30 )             27.1     Chem 08-12    137  |  100  |  48<H>  ----------------------------<  227<H>  5.1   |  26  |  2.97<H>    Ca    8.6      12 Aug 2023 00:30  Phos  3.2     08-12  Mg     2.80     08-12    TPro  6.4  /  Alb  2.2<L>  /  TBili  0.2  /  DBili  x   /  AST  17  /  ALT  16  /  AlkPhos  105  08-12    LFTs LIVER FUNCTIONS - ( 12 Aug 2023 00:30 )  Alb: 2.2 g/dL / Pro: 6.4 g/dL / ALK PHOS: 105 U/L / ALT: 16 U/L / AST: 17 U/L / GGT: x           Coagulopathy PT/INR - ( 12 Aug 2023 00:30 )   PT: 13.4 sec;   INR: 1.19 ratio         PTT - ( 12 Aug 2023 00:30 )  PTT:22.9 sec  Lipid Panel   A1c   Cardiac enzymes     U/A Urinalysis Basic - ( 12 Aug 2023 00:30 )    Color: x / Appearance: x / SG: x / pH: x  Gluc: 227 mg/dL / Ketone: x  / Bili: x / Urobili: x   Blood: x / Protein: x / Nitrite: x   Leuk Esterase: x / RBC: x / WBC x   Sq Epi: x / Non Sq Epi: x / Bacteria: x      CSF  Immunological  Other    STUDIES & IMAGING:  Studies (EKG, EEG, EMG, etc):     Radiology (XR, CT, MR, U/S, TTE/STEPHANIE):    [facility] 8/11:    VENTRICLES AND SULCI: Age-appropriate involutional change  INTRA-AXIAL:  Old left PCA infarct as seen on the prior unchanged.   Microvascular ischemic changes involving the periventricular and   subcortical white matter as seen previously  EXTRA-AXIAL:  No mass or collection is seen.  VISUALIZED SINUSES:  Clear.  VISUALIZED MASTOIDS: Left mastoid sclerosis  CALVARIUM: Infiltrative appearance tothe calvarium may be indicative of   marrow infiltration on the basis of patient's known diagnosis of breast   cancer. MISCELLANEOUS:  None.    IMPRESSION:  No significant interval change compared with 7/17/2023 in   left PCA infarct which is old. Microvascular ischemic changes involving   the periventricular and subcortical white matter as seen   previously.Questionable lesions at the level of the calvarium related to   possible breast CA. Clinical correlation recommended.    --- End of Report ---

## 2023-08-12 NOTE — DIETITIAN INITIAL EVALUATION ADULT - OTHER INFO
72 y/o female with hx HTN, CKD, CHF, breast ca and CVA admitted with respiratory failure. Pt intubated and sedated on propofol presently running @ 12 mL/hr and providing approx 183 kcals over the past 24 hrs. Pt is receiving and tolerating TF which is exceeding her nutrition needs, especially given that propofol is providing additional calories. Contacted MICU Resident and recommended lowering rate of TF to 30 mL.hr x 24 hrs which will provide 1296 kcals, 58 gms protein, 523 mL free H2O in 720 mL total volume. Pt has an advanced stage pressure injury, however as to not compromise her rising SCr, suggest adding only 1 (one) No Carb Prosource (15 gms protein) with TF which will provide a daily protein amount of 73 gms. Enteral recommendation will give pt 22 kcals/kg (TF+Propofol) and 1.05 gms protein/kg (TF+Prosource) of dosing wt. Additionally suggest adding Nephro-Katherine multivitamin to ensure complete micronutrient coverage and to assist with skin recovery. No GI distress noted. FS over the past 24 hrs 218 - 241 mg/dl with NPH and Admelog insulins ordered for coverage. Education not warranted at this time. RDN services to remain available as needed.

## 2023-08-13 NOTE — PROGRESS NOTE ADULT - PROBLEM SELECTOR PLAN 1
Multifactorial   Aspiration, acute on chronic diastolic CHF   Cont with IV abx   Ventilatory and hemodynamic support

## 2023-08-13 NOTE — PROGRESS NOTE ADULT - SUBJECTIVE AND OBJECTIVE BOX
Remains intubated/sedated on Propofol  On Levophed gtt      VITAL:  T(C): , Max: 38.4 (08-12-23 @ 20:00)  T(F): , Max: 101.1 (08-12-23 @ 20:00)  HR: 84 (08-13-23 @ 13:00)  BP: 180/52  RR: 14 (08-13-23 @ 13:00)  SpO2: 100% (08-13-23 @ 13:00)  urine output 5-10cc/h      PHYSICAL EXAM:  Constitutional: intubated/sedated  HEENT: (+)ETT  Neck: Supple, No JVD  Respiratory: coarse BS b/l  Cardiovascular: RRR s1s2, no m/r/g  Gastrointestinal: BS+, soft, NT/ND  GI: (+)willard  Extremities: No peripheral edema b/l  Back: no CVAT b/l  Skin: No rashes, no nevi    LABS:                        8.1    14.11 )-----------( 280      ( 13 Aug 2023 11:10 )             26.7     Na(134)/K(4.7)/Cl(99)/HCO3(24)/BUN(56)/Cr(3.72)Glu(257)/Ca(8.3)/Mg(--)/PO4(--)    08-13 @ 11:10  Na(135)/K(5.0)/Cl(98)/HCO3(25)/BUN(53)/Cr(3.50)Glu(271)/Ca(8.4)/Mg(2.80)/PO4(4.9)    08-13 @ 01:44  Na(137)/K(5.1)/Cl(100)/HCO3(26)/BUN(48)/Cr(2.97)Glu(227)/Ca(8.6)/Mg(2.80)/PO4(3.2)    08-12 @ 00:30  Na(141)/K(4.4)/Cl(108)/HCO3(21)/BUN(37)/Cr(1.76)Glu(261)/Ca(7.0)/Mg(1.60)/PO4(3.5)    08-11 @ 19:50  Na(135)/K(4.9)/Cl(98)/HCO3(28)/BUN(44)/Cr(2.56)Glu(359)/Ca(9.6)/Mg(--)/PO4(--)    08-11 @ 11:50  Na(137)/K(6.0)/Cl(97)/HCO3(32)/BUN(44)/Cr(2.66)Glu(309)/Ca(9.3)/Mg(2.70)/PO4(6.5)    08-11 @ 09:40  Na(134)/K(7.2)/Cl(99)/HCO3(27)/BUN(44)/Cr(2.61)Glu(306)/Ca(8.7)/Mg(--)/PO4(--)    08-11 @ 07:29      IMPRESSION: 73F w/ HTN, DM2, CVA, breast CA-bilateral mastectomy, recurrent aspiration pneumonia/respiratory failure, and CKD, 8/11/23 p/w acute hypercapnic respiratory failure; c/b RUSS    (1)Renal - CKD4   (2)RUSS - oligoanuric ATN - numbers worsening - GFR<15 - no urgent need for dialysis for now  (3)Hyperkalemia - improved relative to 1-2 days ago  (4)Pulm- hypercapnic respiratory failure - intubated  (5)ID - on IV Cefepime  (5)CV - shock - on pressors    RECOMMEND:  (1)Agree with Nepro as tube feed  (2)Levophed as needed to keep SBP>90  (3)Continue strict I/Os  (4)Continue meds for GFR<15 (present dosing is acceptable)  (5)BMP+Mg+PO4 at least q12h  (6)No HD just yet          Jimmy Colon MD  [facility]  Office/on call physician: [phone]  Cell (7a-7p): [phone]       Remains intubated/sedated on Propofol  On Levophed gtt      VITAL:  T(C): , Max: 38.4 (08-12-23 @ 20:00)  T(F): , Max: 101.1 (08-12-23 @ 20:00)  HR: 84 (08-13-23 @ 13:00)  BP: 180/52  RR: 14 (08-13-23 @ 13:00)  SpO2: 100% (08-13-23 @ 13:00)  urine output 5-10cc/h      PHYSICAL EXAM:  Constitutional: intubated/sedated  HEENT: (+)ETT  Neck: Supple, No JVD  Respiratory: coarse BS b/l  Cardiovascular: RRR s1s2, no m/r/g  Gastrointestinal: BS+, soft, NT/ND  GI: (+)willard  Extremities: No peripheral edema b/l  Back: no CVAT b/l  Skin: No rashes, no nevi    LABS:                        8.1    14.11 )-----------( 280      ( 13 Aug 2023 11:10 )             26.7     Na(134)/K(4.7)/Cl(99)/HCO3(24)/BUN(56)/Cr(3.72)Glu(257)/Ca(8.3)/Mg(--)/PO4(--)    08-13 @ 11:10  Na(135)/K(5.0)/Cl(98)/HCO3(25)/BUN(53)/Cr(3.50)Glu(271)/Ca(8.4)/Mg(2.80)/PO4(4.9)    08-13 @ 01:44  Na(137)/K(5.1)/Cl(100)/HCO3(26)/BUN(48)/Cr(2.97)Glu(227)/Ca(8.6)/Mg(2.80)/PO4(3.2)    08-12 @ 00:30  Na(141)/K(4.4)/Cl(108)/HCO3(21)/BUN(37)/Cr(1.76)Glu(261)/Ca(7.0)/Mg(1.60)/PO4(3.5)    08-11 @ 19:50  Na(135)/K(4.9)/Cl(98)/HCO3(28)/BUN(44)/Cr(2.56)Glu(359)/Ca(9.6)/Mg(--)/PO4(--)    08-11 @ 11:50  Na(137)/K(6.0)/Cl(97)/HCO3(32)/BUN(44)/Cr(2.66)Glu(309)/Ca(9.3)/Mg(2.70)/PO4(6.5)    08-11 @ 09:40  Na(134)/K(7.2)/Cl(99)/HCO3(27)/BUN(44)/Cr(2.61)Glu(306)/Ca(8.7)/Mg(--)/PO4(--)    08-11 @ 07:29      IMPRESSION: 73F w/ HTN, DM2, CVA, breast CA-bilateral mastectomy, recurrent aspiration pneumonia/respiratory failure, and CKD, 8/11/23 p/w acute hypercapnic respiratory failure; c/b RUSS    (1)Renal - CKD4   (2)RUSS - oligoanuric ATN - numbers worsening - GFR<15 - no urgent need for dialysis for now  (3)Hyperkalemia - improved relative to 1-2 days ago  (4)Pulm- hypercapnic respiratory failure - intubated  (5)ID - on IV Cefepime  (5)CV - shock - on pressors    RECOMMEND:  (1)Favor Bumex vs Lasix gtt to convert from oligoanuric to nonoliguric ATN  (2)Levophed as needed to keep SBP>90  (3)Continue strict I/Os  (4)Continue meds for GFR<15 (present dosing is acceptable)  (5)BMP+Mg+PO4 at least q12h  (6)No HD just yet          Jimmy Colon MD  [facility]  Office/on call physician: [phone]  Cell (7a-7p): [phone]       Remains intubated/sedated on Propofol  On Levophed gtt + Vasopressin gtt  Family at bedside      VITAL:  T(C): , Max: 38.4 (08-12-23 @ 20:00)  T(F): , Max: 101.1 (08-12-23 @ 20:00)  HR: 84 (08-13-23 @ 13:00)  BP: 180/52  RR: 14 (08-13-23 @ 13:00)  SpO2: 100% (08-13-23 @ 13:00)  urine output 5-10cc/h      PHYSICAL EXAM:  Constitutional: intubated/sedated  HEENT: (+)ETT/OGT  Neck: Supple, No JVD  Respiratory: coarse BS b/l  Cardiovascular: RRR s1s2, no m/r/g  Gastrointestinal: BS+, soft, NT/ND  GI: (+)willard  Extremities: No peripheral edema b/l  Back: no CVAT b/l  Skin: No rashes, no nevi    LABS:                        8.1    14.11 )-----------( 280      ( 13 Aug 2023 11:10 )             26.7     Na(134)/K(4.7)/Cl(99)/HCO3(24)/BUN(56)/Cr(3.72)Glu(257)/Ca(8.3)/Mg(--)/PO4(--)    08-13 @ 11:10  Na(135)/K(5.0)/Cl(98)/HCO3(25)/BUN(53)/Cr(3.50)Glu(271)/Ca(8.4)/Mg(2.80)/PO4(4.9)    08-13 @ 01:44  Na(137)/K(5.1)/Cl(100)/HCO3(26)/BUN(48)/Cr(2.97)Glu(227)/Ca(8.6)/Mg(2.80)/PO4(3.2)    08-12 @ 00:30  Na(141)/K(4.4)/Cl(108)/HCO3(21)/BUN(37)/Cr(1.76)Glu(261)/Ca(7.0)/Mg(1.60)/PO4(3.5)    08-11 @ 19:50  Na(135)/K(4.9)/Cl(98)/HCO3(28)/BUN(44)/Cr(2.56)Glu(359)/Ca(9.6)/Mg(--)/PO4(--)    08-11 @ 11:50  Na(137)/K(6.0)/Cl(97)/HCO3(32)/BUN(44)/Cr(2.66)Glu(309)/Ca(9.3)/Mg(2.70)/PO4(6.5)    08-11 @ 09:40  Na(134)/K(7.2)/Cl(99)/HCO3(27)/BUN(44)/Cr(2.61)Glu(306)/Ca(8.7)/Mg(--)/PO4(--)    08-11 @ 07:29      IMPRESSION: 73F w/ HTN, DM2, CVA, breast CA-bilateral mastectomy, recurrent aspiration pneumonia/respiratory failure, and CKD, 8/11/23 p/w acute hypercapnic respiratory failure; c/b RUSS    (1)Renal - CKD4   (2)RUSS - oligoanuric ATN - numbers worsening - GFR<15 - no urgent need for dialysis for now  (3)Hyperkalemia - improved relative to 1-2 days ago  (4)Pulm- hypercapnic respiratory failure - intubated  (5)ID - on IV Cefepime  (5)CV - shock - on pressors    RECOMMEND:  (1)Favor Bumex vs Lasix gtt to convert from oligoanuric to nonoliguric ATN  (2)Levophed as needed to keep SBP>90  (3)Continue strict I/Os  (4)Continue meds for GFR<15 (present dosing is acceptable)  (5)BMP+Mg+PO4 at least q12h  (6)No HD just yet          Jimmy Colon MD  [facility]  Office/on call physician: [phone]  Cell (7a-7p): [phone]

## 2023-08-13 NOTE — EEG REPORT - NS EEG TEXT BOX
MILANA BALL [id number]     Study Date: 	08-12-23 0800	08-13-23 0800  Duration x Hours: 24 hours  --------------------------------------------------------------------------------------------------  History:  CC/ HPI Patient is a 73y old  Female who presents with a chief complaint of Respiratory distress (12 Aug 2023 10:42)    MEDICATIONS  (STANDING):  albuterol/ipratropium for Nebulization 3 milliLiter(s) Nebulizer every 6 hours  aspirin  chewable 81 milliGRAM(s) Oral daily  atorvastatin 40 milliGRAM(s) Oral at bedtime  buMETAnide Injectable 2 milliGRAM(s) IV Push once  cefepime   IVPB 1000 milliGRAM(s) IV Intermittent every 24 hours  chlorhexidine 0.12% Liquid 15 milliLiter(s) Oral Mucosa every 12 hours  chlorhexidine 2% Cloths 1 Application(s) Topical daily  dextrose 5%. 1000 milliLiter(s) (50 mL/Hr) IV Continuous <Continuous>  dextrose 5%. 1000 milliLiter(s) (100 mL/Hr) IV Continuous <Continuous>  dextrose 50% Injectable 12.5 Gram(s) IV Push once  dextrose 50% Injectable 25 Gram(s) IV Push once  dextrose 50% Injectable 25 Gram(s) IV Push once  dextrose 50% Injectable 25 milliLiter(s) IV Push once  glucagon  Injectable 1 milliGRAM(s) IntraMuscular once  heparin   Injectable 5000 Unit(s) SubCutaneous every 8 hours  insulin lispro (ADMELOG) corrective regimen sliding scale   SubCutaneous every 6 hours  insulin NPH human recombinant 3 Unit(s) SubCutaneous every 6 hours  insulin regular  human recombinant 5 Unit(s) IV Push once  norepinephrine Infusion 0.08 MICROgram(s)/kG/Min (4.99 mL/Hr) IV Continuous <Continuous>  propofol Infusion 10 MICROgram(s)/kG/Min (3.99 mL/Hr) IV Continuous <Continuous>  sodium chloride 3%  Inhalation 4 milliLiter(s) Inhalation every 6 hours    --------------------------------------------------------------------------------------------------  Study Interpretation:    [[[Abbreviation Key:  PDR=alpha rhythm/posterior dominant rhythm. A-P=anterior posterior.  Amplitude: ‘very low’:<20; ‘low’:20-49; ‘medium’:; ‘high’:>150uV.  Persistence for periodic/rhythmic patterns (% of epoch) ‘rare’:<1%; ‘occasional’:1-10%; ‘frequent’:10-50%; ‘abundant’:50-90%; ‘continuous’:>90%.  Persistence for sporadic discharges: ‘rare’:<1/hr; ‘occasional’:1/min-1/hr; ‘frequent’:>1/min; ‘abundant’:>1/10 sec.  RPP=rhythmic and periodic patterns; GRDA=generalized rhythmic delta activity; FIRDA=frontal intermittent GRDA; LRDA=lateralized rhythmic delta activity; TIRDA=temporal intermittent rhythmic delta activity;  LPD=PLED=lateralized periodic discharges; GPD=generalized periodic discharges; BIPDs =bilateral independent periodic discharges; Mf=multifocal; SIRPDs=stimulus induced rhythmic, periodic, or ictal appearing discharges; BIRDs=brief potentially ictal rhythmic discharges >4 Hz, lasting .5-10s; PFA (paroxysmal bursts >13 Hz or =8 Hz <10s).  Modifiers: +F=with fast component; +S=with spike component; +R=with rhythmic component.  S-B=burst suppression pattern.  Max=maximal. N1-drowsy; N2-stage II sleep; N3-slow wave sleep. SSS/BETS=small sharp spikes/benign epileptiform transients of sleep. HV=hyperventilation; PS=photic stimulation]]]    Daily EEG Visual Analysis    FINDINGS:      Background:  Continuity: discontinuous  Symmetry: symmetric  PDR: non.  Reactivity: absent  Voltage: normal  Anterior Posterior Gradient: Absent  Other background findings: none  Breach: absent    Background Slowing:  Generalized slowing: Diffuse polymorphic delta/theta slowing   Focal slowing: none was present.    State Changes:   -Absent    Sporadic Epileptiform Discharges:    None    Rhythmic and Periodic Patterns (RPPs):  None     Electrographic and Electroclinical seizures:  None    Other Clinical Events:  none    Activation Procedures:   -Hyperventilation was not performed.    -Photic stimulation was not performed.    Artifacts:  Intermittent myogenic and movement artifacts were noted.    ECG:  The heart rate on single channel ECG was predominantly between 100 to 110 BPM.    EEG Classification / Summary:  Abnormal  EEG in lethargic patient.  - Severe background slowing with discontinuity    Clinical Impression:  - Severe diffuse non-specific cerebral dysfunction  - There were no epileptiform abnormalities or seizures recorded.      This is fellow preliminary read, pending attending review.     -------------------------------------------------------------------------------------------------------  [facility] EEG Reading Room Ph#: [phone]  Epilepsy Answering Service after 5PM and before 8:30AM: Ph#: [phone] MILANA BALL [id number]     Study Date: 	08-12-23 0800	08-13-23 0800  Duration x Hours: 24 hours  --------------------------------------------------------------------------------------------------  History:  CC/ HPI Patient is a 73y old  Female who presents with a chief complaint of Respiratory distress (12 Aug 2023 10:42)    MEDICATIONS  (STANDING):  albuterol/ipratropium for Nebulization 3 milliLiter(s) Nebulizer every 6 hours  aspirin  chewable 81 milliGRAM(s) Oral daily  atorvastatin 40 milliGRAM(s) Oral at bedtime  buMETAnide Injectable 2 milliGRAM(s) IV Push once  cefepime   IVPB 1000 milliGRAM(s) IV Intermittent every 24 hours  chlorhexidine 0.12% Liquid 15 milliLiter(s) Oral Mucosa every 12 hours  chlorhexidine 2% Cloths 1 Application(s) Topical daily  dextrose 5%. 1000 milliLiter(s) (50 mL/Hr) IV Continuous <Continuous>  dextrose 5%. 1000 milliLiter(s) (100 mL/Hr) IV Continuous <Continuous>  dextrose 50% Injectable 12.5 Gram(s) IV Push once  dextrose 50% Injectable 25 Gram(s) IV Push once  dextrose 50% Injectable 25 Gram(s) IV Push once  dextrose 50% Injectable 25 milliLiter(s) IV Push once  glucagon  Injectable 1 milliGRAM(s) IntraMuscular once  heparin   Injectable 5000 Unit(s) SubCutaneous every 8 hours  insulin lispro (ADMELOG) corrective regimen sliding scale   SubCutaneous every 6 hours  insulin NPH human recombinant 3 Unit(s) SubCutaneous every 6 hours  insulin regular  human recombinant 5 Unit(s) IV Push once  norepinephrine Infusion 0.08 MICROgram(s)/kG/Min (4.99 mL/Hr) IV Continuous <Continuous>  propofol Infusion 10 MICROgram(s)/kG/Min (3.99 mL/Hr) IV Continuous <Continuous>  sodium chloride 3%  Inhalation 4 milliLiter(s) Inhalation every 6 hours    --------------------------------------------------------------------------------------------------  Study Interpretation:    [[[Abbreviation Key:  PDR=alpha rhythm/posterior dominant rhythm. A-P=anterior posterior.  Amplitude: ‘very low’:<20; ‘low’:20-49; ‘medium’:; ‘high’:>150uV.  Persistence for periodic/rhythmic patterns (% of epoch) ‘rare’:<1%; ‘occasional’:1-10%; ‘frequent’:10-50%; ‘abundant’:50-90%; ‘continuous’:>90%.  Persistence for sporadic discharges: ‘rare’:<1/hr; ‘occasional’:1/min-1/hr; ‘frequent’:>1/min; ‘abundant’:>1/10 sec.  RPP=rhythmic and periodic patterns; GRDA=generalized rhythmic delta activity; FIRDA=frontal intermittent GRDA; LRDA=lateralized rhythmic delta activity; TIRDA=temporal intermittent rhythmic delta activity;  LPD=PLED=lateralized periodic discharges; GPD=generalized periodic discharges; BIPDs =bilateral independent periodic discharges; Mf=multifocal; SIRPDs=stimulus induced rhythmic, periodic, or ictal appearing discharges; BIRDs=brief potentially ictal rhythmic discharges >4 Hz, lasting .5-10s; PFA (paroxysmal bursts >13 Hz or =8 Hz <10s).  Modifiers: +F=with fast component; +S=with spike component; +R=with rhythmic component.  S-B=burst suppression pattern.  Max=maximal. N1-drowsy; N2-stage II sleep; N3-slow wave sleep. SSS/BETS=small sharp spikes/benign epileptiform transients of sleep. HV=hyperventilation; PS=photic stimulation]]]    Daily EEG Visual Analysis    FINDINGS:      Background:  Continuity: discontinuous  Symmetry: symmetric  PDR: non.  Reactivity: absent  Voltage: normal  Anterior Posterior Gradient: Absent  Other background findings: none  Breach: absent    Background Slowing:  Generalized slowing: Diffuse polymorphic delta/theta slowing   Focal slowing: none was present.    State Changes:   -Absent    Sporadic Epileptiform Discharges:    None    Rhythmic and Periodic Patterns (RPPs):  None     Electrographic and Electroclinical seizures:  None    Other Clinical Events:  none    Activation Procedures:   -Hyperventilation was not performed.    -Photic stimulation was not performed.    Artifacts:  Intermittent myogenic and movement artifacts were noted.    ECG:  The heart rate on single channel ECG was predominantly between 100 to 110 BPM.    EEG Classification / Summary:  Abnormal  EEG in lethargic patient.  - Severe background slowing with discontinuity    Clinical Impression:  - Severe diffuse non-specific cerebral dysfunction  - There were no epileptiform abnormalities or seizures recorded.          -------------------------------------------------------------------------------------------------------  [facility] EEG Reading Room Ph#: [phone]  Epilepsy Answering Service after 5PM and before 8:30AM: Ph#: [phone]

## 2023-08-13 NOTE — PROGRESS NOTE ADULT - ASSESSMENT
74 y/o F DM on insulin, HTN, CKD,breast CA, respiratory arrest and cardiac arrest (2018), CVA with residual weakness, aspiration PNA h/o trache, PEG, both removed presents with respiratory distress requiring intubation.

## 2023-08-13 NOTE — PROGRESS NOTE ADULT - ASSESSMENT
72 y/o F well known to me from my [facility] out\[facility]\"" practice. she was admitted at [facility] 7/12-7/22 w aspiration PNA, was treated w CEFEPIME, developed an allergic rash,  dCHF, + MAC on AFB culture, had been progressively getting more and more lethargic and dyspneic at home since DC.   In  am of 8/11/23  ptn presented with respiratory distress w hypoxia and hypercarbia requiring intubation 2/2 volume overload +/- Asp PNA      Neuro   Sedated   well known to Dr. Bianchi, consult reviewed   Baseline MS AOx3, aphasic   Started on fentanyl with intubation   responsive to commands or pain   c/w sedation and wean when respiratory status improves  - h/o CVA , on aspirin and statin . resumed w feeding tube  - eeg  2/2 tremors, no sz focus    Cardiac   Ptn well known to Dr. Dunn, consult reviewed   CHFpEF   TTE 7/2023 with EF 59%, with severe LVH and diastolic dysfunction   pro-BNP > 15528, trop 78       Pulmonary   Acute hypercapnic and hypoxemic respiratory failure   well known to Dr. Mcnulty, notified  DDx: aspiration vs volume overload   VBG with respiratory acidosis pCO2 111  CT chest: mod ( worsening) R pl effusion  - cefepime DCed on 8/13, started on Moxifloxacin     Renal   Hyperkalemia with EKG changes  , initially treated w LOKELMA,  now resolved   Ptn well know to Dr. Colon, consult reviewed   7.2, hemolyzed, given insulin and D50 + calcium gluc    oliguric RUSS 2/2 ATN, now on BUMEX drip, no neew for HD.       GI  NPO for now, on tube feeds  would start GI ppx w IV PPI    Endocrine  T2DM   A1C 7.1 in 7/2023   - BG goal 140-200     ID   No fever/leukocytosis, recheck temp   Hx latent TB which was treated, no concern for TB   - grew MAC on AFB culture from most recent admission. would call ID consult  Started on empiric vancomycin and aztreonam,  changed to cefep[rocio 8/12, cefepime dced on 8/13(cefepime/zosyn allergy.  developed rash when on cefepime on previous admission ) . started on AVALOX on 8/13  - f/u MRSA   - follow up blood culture/urine     Heme/Onc  ppx: heparin q8 hrs     Ethics  GOC - Discussed GOC with daughter and , they have opted in the past for full code. and she remains full code at present

## 2023-08-13 NOTE — PROGRESS NOTE ADULT - ASSESSMENT
74 y/o F DM on insulin, HTN, CKD, CHFpEF, breast CA, respiratory arrest and cardiac arrest (2018), CVA with residual weakness, aspiration PNA h/o trache, PEG, both removed presents with respiratory distress requiring intubation. May volume overload i/s/o CHF/CKD vs pneumonia with thick secretions.     Neuro   #Sedated   Baseline MS AOx3   Started on fentanyl with intubation, now on prop   Not responsive to commands or pain  - c/w sedation and wean when respiratory status improves    #CVA   - aspirin and statin     Cardiac   #CHFpEF   TTE 7/2023 with EF 59%, with severe LVH and diastolic dysfunction   pro-BNP > 68698, trop 78   Repeat TTE with EF 60% normal systolic and grade 1 diastolic dysfunction   - wean pressors as tolerated   - holding home coreg 37.5 BID     Pulmonary   #Acute hypercapnic and hypoxemic respiratory failure   DDx: aspiration vs volume overload   VBG with respiratory acidosis pCO2 111  CXR with small right pleural effusion, no consolidations/opacities  - c/w mechanical ventilation   - repeat gas on vent      /Renal   #Hyperkalemia with EKG changes    7.2, hemolyzed, given insulin and D50 + calcium gluc   Likely i/s/o renal dysfunction   Received lasix 80 and 40 IV initially but without adequate response   Given bumex 2mg this morning   Per nephro, hold off on additional diuresis   - follow up BMP q12     GI  Diet: NPO with tube feeds     Endocrine  #T2DM   A1C 7.1 in 7/2023   - started NPH 3 q6   - BG goal 140-200     ID   No fever/leukocytosis, recheck temp   Hx latent TB which was treated, no concern for TB   Started on empiric vancomycin and aztreonam (cefepime/zosyn allergy? developed rash req prednisone)   - c/w vanc by level  - switched aztreonam to cefepime (8/12) as airway is protected and has received ancef prior   - f/u MRSA   - follow up blood culture/urine     Heme/Onc  ppx: heparin q8 hrs     Ethics  GOC - Discussed GOC with daughter and , reported that they have not talked about end of life care with the patient. For now, they wanted "everything to be done" including CPR, continuing with mech ventilation/intubation, pressors. Will re-address.            74 y/o F DM on insulin, HTN, CKD, CHFpEF, breast CA, respiratory arrest and cardiac arrest (2018), CVA with residual weakness, aspiration PNA h/o trache, PEG, both removed presents with respiratory distress requiring intubation. May volume overload i/s/o CHF/CKD vs pneumonia with thick secretions.     Neuro   #Sedated   Baseline MS AOx3   On prop for sedation. Wean for mental status assessment - had eye opening per family endorsement on evening 8/12    #CVA   - aspirin and statin     Cardiac   #Shock  - Likely mixed septic and vasoplegic from prop  - On levo, added vaso in order to wean down levo  - Adding midodrine 10mg q8hr for further weaning  - holding home coreg 37.5 BID    #CHFpEF   TTE 7/2023 with EF 59%, with severe LVH and diastolic dysfunction   pro-BNP > 05308, trop 78   Repeat TTE with EF 60% normal systolic and grade 1 diastolic dysfunction   - holding home coreg 37.5 BID     Pulmonary   #Acute hypercapnic and hypoxemic respiratory failure   DDx: aspiration vs volume overload   VBG with respiratory acidosis pCO2 111  CXR with small right pleural effusion, no consolidations/opacities  - c/w mechanical ventilation   - monitor blood gas while on vent      /Renal   #RUSS  - Rising creatinine, appears to now be plateauing at this time. Likely ATN in setting of sepsis  - Continue to monitor, dose meds per eGFR. avoid nephrotoxic agents  - No acute HD needs at this time    #Hyperkalemia with EKG changes    7.2, hemolyzed, given insulin and D50 + calcium gluc   Likely i/s/o renal dysfunction   Received lasix 80 and 40 IV initially but without adequate response   Given bumex 2mg the morning of 8/12  Per nephro, hold off on additional diuresis   - follow up BMP q12   - Improved AM of 8/13/23    GI  Diet: NPO with tube feeds     Endocrine  #T2DM   A1C 7.1 in 7/2023   - Increased NPH to 5U q6 from 3U q6 given still elevated blood sugars   - BG goal 140-200     ID   No fever/leukocytosis, recheck temp   Hx latent TB which was treated, no concern for TB   Started on empiric vancomycin and aztreonam (cefepime/zosyn allergy? developed rash req prednisone)   - c/w vanc by level. Last dose of vanc on 8/13/23, 1000mg. Follow up next random level on 8/14 for redose if needed based on staph aureus data from sputum  - DC cefepime 8/13  - If sputum shows MSSA, will switch to ancef  - MSSA/MRSA nares swab negative, but showing staph aureus in her sputum   - Blood and urine cultures demonstrating no growth currently    Heme/Onc  ppx: heparin q8 hrs     Ethics  GOC - Per previous GOC with daughter and , reported that they have not talked about end of life care with the patient. For now, they wanted "everything to be done" including CPR, continuing with mech ventilation/intubation, pressors. Will re-address over course

## 2023-08-13 NOTE — PROGRESS NOTE ADULT - SUBJECTIVE AND OBJECTIVE BOX
MICU Progress Note      INTERVAL HPI/OVERNIGHT EVENTS:    SUBJECTIVE: Patient seen and examined at bedside.       OBJECTIVE:    VITAL SIGNS:  ICU Vital Signs Last 24 Hrs  T(C): 37.1 (13 Aug 2023 04:00), Max: 38.4 (12 Aug 2023 20:00)  T(F): 98.8 (13 Aug 2023 04:00), Max: 101.1 (12 Aug 2023 20:00)  HR: 85 (13 Aug 2023 07:04) (71 - 113)  BP: --  BP(mean): --  ABP: 134/33 (13 Aug 2023 06:30) (98/29 - 168/56)  ABP(mean): 66 (13 Aug 2023 06:30) (51 - 98)  RR: 14 (13 Aug 2023 06:30) (14 - 23)  SpO2: 100% (13 Aug 2023 07:04) (97% - 100%)    O2 Parameters below as of 13 Aug 2023 04:09  Patient On (Oxygen Delivery Method): ventilator          Mode: AC/ CMV (Assist Control/ Continuous Mandatory Ventilation), RR (machine): 14, TV (machine): 360, FiO2: 30, PEEP: 5, ITime: 0.77, MAP: 10, PIP: 35    08-12 @ 07:01  -  08-13 @ 07:00  --------------------------------------------------------  IN: 1608 mL / OUT: 267 mL / NET: 1341 mL      CAPILLARY BLOOD GLUCOSE      POCT Blood Glucose.: 254 mg/dL (13 Aug 2023 05:40)      PHYSICAL EXAM:  General: sedated  HEENT: NCAT, PERRL, clear conjunctiva, sclera anicteric  Neck: supple, no JVD  Respiratory: CTAB  Cardiovascular: RRR, S1S2, no m/r/g  Abdomen: soft, nontender, nondistended, normal bowel sounds  Extremities: no edema, no cyanosis  Skin: warm, perfused  Neurological: nonfocal    MEDICATIONS:  MEDICATIONS  (STANDING):  albuterol/ipratropium for Nebulization 3 milliLiter(s) Nebulizer every 6 hours  aspirin  chewable 81 milliGRAM(s) Oral daily  atorvastatin 40 milliGRAM(s) Oral at bedtime  cefepime   IVPB 1000 milliGRAM(s) IV Intermittent every 24 hours  chlorhexidine 0.12% Liquid 15 milliLiter(s) Oral Mucosa every 12 hours  chlorhexidine 2% Cloths 1 Application(s) Topical daily  dextrose 5%. 1000 milliLiter(s) (100 mL/Hr) IV Continuous <Continuous>  dextrose 5%. 1000 milliLiter(s) (50 mL/Hr) IV Continuous <Continuous>  dextrose 50% Injectable 12.5 Gram(s) IV Push once  dextrose 50% Injectable 25 Gram(s) IV Push once  dextrose 50% Injectable 25 Gram(s) IV Push once  dextrose 50% Injectable 25 milliLiter(s) IV Push once  glucagon  Injectable 1 milliGRAM(s) IntraMuscular once  heparin   Injectable 5000 Unit(s) SubCutaneous every 8 hours  insulin lispro (ADMELOG) corrective regimen sliding scale   SubCutaneous every 6 hours  insulin NPH human recombinant 3 Unit(s) SubCutaneous every 6 hours  insulin regular  human recombinant 5 Unit(s) IV Push once  norepinephrine Infusion 0.08 MICROgram(s)/kG/Min (4.99 mL/Hr) IV Continuous <Continuous>  propofol Infusion 10 MICROgram(s)/kG/Min (3.99 mL/Hr) IV Continuous <Continuous>  sodium chloride 3%  Inhalation 4 milliLiter(s) Inhalation every 6 hours    MEDICATIONS  (PRN):  acetaminophen   Oral Liquid .. 650 milliGRAM(s) Oral every 6 hours PRN Temp greater or equal to 38C (100.4F), Mild Pain (1 - 3)  dextrose Oral Gel 15 Gram(s) Oral once PRN Blood Glucose LESS THAN 70 milliGRAM(s)/deciliter      ALLERGIES:  Allergies    isoniazid (Rash)  nafcillin (Unknown)  hydrALAZINE (Rash)  vitamin E (Short breath; Urticaria; Hives)  doxycycline (Rash)  cefepime (Rash)  NIFEdipine (Urticaria; Hives)    Intolerances        LABS:                        7.7    8.56  )-----------( 240      ( 13 Aug 2023 01:44 )             25.2     08-13    135  |  98  |  53<H>  ----------------------------<  271<H>  5.0   |  25  |  3.50<H>    Ca    8.4      13 Aug 2023 01:44  Phos  4.9     08-13  Mg     2.80     08-13    TPro  5.5<L>  /  Alb  1.8<L>  /  TBili  <0.2  /  DBili  x   /  AST  14  /  ALT  12  /  AlkPhos  115  08-13    PT/INR - ( 12 Aug 2023 00:30 )   PT: 13.4 sec;   INR: 1.19 ratio         PTT - ( 12 Aug 2023 00:30 )  PTT:22.9 sec  Urinalysis Basic - ( 13 Aug 2023 01:44 )    Color: x / Appearance: x / SG: x / pH: x  Gluc: 271 mg/dL / Ketone: x  / Bili: x / Urobili: x   Blood: x / Protein: x / Nitrite: x   Leuk Esterase: x / RBC: x / WBC x   Sq Epi: x / Non Sq Epi: x / Bacteria: x        RADIOLOGY & ADDITIONAL TESTS: Reviewed. MICU Progress Note      INTERVAL HPI/OVERNIGHT EVENTS: Febrile overnight, received IV acetaminophen.    SUBJECTIVE/OBJECTIVE: Patient seen and examined at bedside. Patient intubated and sedated. Unable to assess ROS.        VITAL SIGNS:  ICU Vital Signs Last 24 Hrs  T(C): 37.1 (13 Aug 2023 04:00), Max: 38.4 (12 Aug 2023 20:00)  T(F): 98.8 (13 Aug 2023 04:00), Max: 101.1 (12 Aug 2023 20:00)  HR: 85 (13 Aug 2023 07:04) (71 - 113)  BP: --  BP(mean): --  ABP: 134/33 (13 Aug 2023 06:30) (98/29 - 168/56)  ABP(mean): 66 (13 Aug 2023 06:30) (51 - 98)  RR: 14 (13 Aug 2023 06:30) (14 - 23)  SpO2: 100% (13 Aug 2023 07:04) (97% - 100%)    O2 Parameters below as of 13 Aug 2023 04:09  Patient On (Oxygen Delivery Method): ventilator          Mode: AC/ CMV (Assist Control/ Continuous Mandatory Ventilation), RR (machine): 14, TV (machine): 360, FiO2: 30, PEEP: 5, ITime: 0.77, MAP: 10, PIP: 35    08-12 @ 07:01  -  08-13 @ 07:00  --------------------------------------------------------  IN: 1608 mL / OUT: 267 mL / NET: 1341 mL      CAPILLARY BLOOD GLUCOSE      POCT Blood Glucose.: 254 mg/dL (13 Aug 2023 05:40)      PHYSICAL EXAM:  General: sedated  HEENT: NCAT, PERRL, clear conjunctiva, sclera anicteric  Neck: supple, no JVD  Respiratory: CTAB  Cardiovascular: RRR, S1S2, no m/r/g  Abdomen: soft, nontender, nondistended, normal bowel sounds  Extremities: no edema, no cyanosis  Skin: warm, perfused  Neurological: nonfocal    MEDICATIONS:  MEDICATIONS  (STANDING):  albuterol/ipratropium for Nebulization 3 milliLiter(s) Nebulizer every 6 hours  aspirin  chewable 81 milliGRAM(s) Oral daily  atorvastatin 40 milliGRAM(s) Oral at bedtime  cefepime   IVPB 1000 milliGRAM(s) IV Intermittent every 24 hours  chlorhexidine 0.12% Liquid 15 milliLiter(s) Oral Mucosa every 12 hours  chlorhexidine 2% Cloths 1 Application(s) Topical daily  dextrose 5%. 1000 milliLiter(s) (100 mL/Hr) IV Continuous <Continuous>  dextrose 5%. 1000 milliLiter(s) (50 mL/Hr) IV Continuous <Continuous>  dextrose 50% Injectable 12.5 Gram(s) IV Push once  dextrose 50% Injectable 25 Gram(s) IV Push once  dextrose 50% Injectable 25 Gram(s) IV Push once  dextrose 50% Injectable 25 milliLiter(s) IV Push once  glucagon  Injectable 1 milliGRAM(s) IntraMuscular once  heparin   Injectable 5000 Unit(s) SubCutaneous every 8 hours  insulin lispro (ADMELOG) corrective regimen sliding scale   SubCutaneous every 6 hours  insulin NPH human recombinant 3 Unit(s) SubCutaneous every 6 hours  insulin regular  human recombinant 5 Unit(s) IV Push once  norepinephrine Infusion 0.08 MICROgram(s)/kG/Min (4.99 mL/Hr) IV Continuous <Continuous>  propofol Infusion 10 MICROgram(s)/kG/Min (3.99 mL/Hr) IV Continuous <Continuous>  sodium chloride 3%  Inhalation 4 milliLiter(s) Inhalation every 6 hours    MEDICATIONS  (PRN):  acetaminophen   Oral Liquid .. 650 milliGRAM(s) Oral every 6 hours PRN Temp greater or equal to 38C (100.4F), Mild Pain (1 - 3)  dextrose Oral Gel 15 Gram(s) Oral once PRN Blood Glucose LESS THAN 70 milliGRAM(s)/deciliter      ALLERGIES:  Allergies    isoniazid (Rash)  nafcillin (Unknown)  hydrALAZINE (Rash)  vitamin E (Short breath; Urticaria; Hives)  doxycycline (Rash)  cefepime (Rash)  NIFEdipine (Urticaria; Hives)    Intolerances        LABS:                        7.7    8.56  )-----------( 240      ( 13 Aug 2023 01:44 )             25.2     08-13    135  |  98  |  53<H>  ----------------------------<  271<H>  5.0   |  25  |  3.50<H>    Ca    8.4      13 Aug 2023 01:44  Phos  4.9     08-13  Mg     2.80     08-13    TPro  5.5<L>  /  Alb  1.8<L>  /  TBili  <0.2  /  DBili  x   /  AST  14  /  ALT  12  /  AlkPhos  115  08-13    PT/INR - ( 12 Aug 2023 00:30 )   PT: 13.4 sec;   INR: 1.19 ratio         PTT - ( 12 Aug 2023 00:30 )  PTT:22.9 sec  Urinalysis Basic - ( 13 Aug 2023 01:44 )    Color: x / Appearance: x / SG: x / pH: x  Gluc: 271 mg/dL / Ketone: x  / Bili: x / Urobili: x   Blood: x / Protein: x / Nitrite: x   Leuk Esterase: x / RBC: x / WBC x   Sq Epi: x / Non Sq Epi: x / Bacteria: x        RADIOLOGY & ADDITIONAL TESTS: Reviewed.

## 2023-08-13 NOTE — PROGRESS NOTE ADULT - SUBJECTIVE AND OBJECTIVE BOX
{\rtf1\myclgm02605\ansi\nbhbcol8102\ftnbj\uc1\deff0  {\fonttbl{\f0 \fnil Segoe UI;}{\f1 \fnil \fcharset0 Segoe UI;}{\f2 \fnil Times New Claus;}}  {\colortbl ;\yay338\uugal099\gemw316 ;\red0\green0\blue0 ;\red0\green0\depd854 ;\red0\green0\blue0 ;}  {\stylesheet{\f0\fs20 Normal;}{\cs1 Default Paragraph Font;}{\cs2\f0\fs16 Line Number;}{\cs3\f2\fs24\ul\cf3 Hyperlink;}}  {\*\revtbl{Unknown;}}  \omvwdv01146\stbdku35763\nscwv5741\zrbuj8783\lcaac3324\xnpvw5577\rbsddnp951\hpbhuvd748\nogrowautofit\ykkbhs350\formshade\nofeaturethrottle1\dntblnsbdb\fet4\aendnotes\aftnnrlc\pgbrdrhead\pgbrdrfoot  \sectd\gdfxkl87756\rqtfvr27652\guttersxn0\jgzbovzh7895\zdfpdbrx8983\adamwonj3999\pmgujorf5490\zjdbvvr513\ujjyefc760\sbkpage\pgncont\pgndec  \plain\plain\f0\fs24\pard\plain\f0\fs24\plain\f0\fs20\wslp1094\hich\f0\dbch\f0\loch\f0\fs20 Subjective: Patient seen and examined. No new events except as noted. \par  remains intubated in ICU \par  On levophed \par   at bedside\par  \par  \par  REVIEW OF SYSTEMS:\par  \par  Unable to obtain \par  \par  MEDICATIONS:\par  MEDICATIONS  (STANDING):\par  albuterol/ipratropium for Nebulization 3 milliLiter(s) Nebulizer every 6 hours\par  aspirin  chewable 81 milliGRAM(s) Oral daily\par  atorvastatin 40 milliGRAM(s) Oral at bedtime\par  cefepime   IVPB 1000 milliGRAM(s) IV Intermittent every 24 hours\par  chlorhexidine 0.12% Liquid 15 milliLiter(s) Oral Mucosa every 12 hours\par  chlorhexidine 2% Cloths 1 Application(s) Topical daily\par  dextrose 5%. 1000 milliLiter(s) (100 mL/Hr) IV Continuous <Continuous>\par  dextrose 5%. 1000 milliLiter(s) (50 mL/Hr) IV Continuous <Continuous>\par  dextrose 50% Injectable 25 Gram(s) IV Push once\par  dextrose 50% Injectable 25 Gram(s) IV Push once\par  dextrose 50% Injectable 12.5 Gram(s) IV Push once\par  dextrose 50% Injectable 25 milliLiter(s) IV Push once\par  glucagon  Injectable 1 milliGRAM(s) IntraMuscular once\par  heparin   Injectable 5000 Unit(s) SubCutaneous every 8 hours\par  insulin lispro (ADMELOG) corrective regimen sliding scale   SubCutaneous every 6 hours\par  insulin NPH human recombinant 3 Unit(s) SubCutaneous every 6 hours\par  insulin regular  human recombinant 5 Unit(s) IV Push once\par  norepinephrine Infusion 0.08 MICROgram(s)/kG/Min (4.99 mL/Hr) IV Continuous <Continuous>\par  propofol Infusion 10 MICROgram(s)/kG/Min (3.99 mL/Hr) IV Continuous <Continuous>\par  sodium chloride 3%  Inhalation 4 milliLiter(s) Inhalation every 6 hours\par  \par  \par  PHYSICAL EXAM:\par  T(C): 37.1 (08-13-23 @ 04:00), Max: 38.4 (08-12-23 @ 20:00)\par  HR: 85 (08-13-23 @ 07:04) (71 - 113)\par  BP: --\par  RR: 14 (08-13-23 @ 06:30) (14 - 23)\par  SpO2: 100% (08-13-23 @ 07:04) (97% - 100%)\par  Wt(kg): --\par  I&O's Summary\par  \par  12 Aug 2023 07:01  -  13 Aug 2023 07:00\par  --------------------------------------------------------\par  IN: 1608 mL / OUT: 267 mL / NET: 1341 mL\par  \par  Appearance: NAD, intubated, sedated\tab\par  HEENT: dry oral mucosa, L IJ AMBROCIO \par  Lymphatic: No lymphadenopathy\par  Cardiovascular: Normal S1 S2, No JVD, No murmurs, No edema\par  Respiratory:  Decreased BS, intubated on ventilator\tab\par  Psychiatry: A & O x 0, sedated\par  Gastrointestinal: Soft, Non-tender, + BS, + OGT\tab\par  Skin: No rashes, No ecchymoses, No cyanosis\tab\par  Extremities: No strength/ROM 2/2 sedation, + BL LE edema\par  Vascular: Peripheral pulses palpable 2+ bilaterally\par  +willard\par  \par  \par  LABS:\par  \par  CARDIAC MARKERS:\par  \par  \par  \par  \par           \par             7.7  \par  8.56  )-----------( 240      ( 13 Aug 2023 01:44 )\par             25.2 \par  \par  08-13\par  \par  135  |  98  |  53<H>\par  ----------------------------<  271<H>\par  5.0   |  25  |  3.50<H>\par  \par  Ca    8.4      13 Aug 2023 01:44\par  Phos  4.9     08-13\par  Mg     2.80     08-13\par  \par  TPro  5.5<L>  /  Alb  1.8<L>  /  TBili  <0.2  /  DBili  x   /  AST  14  /  ALT  12  /  AlkPhos  115  08-13\par  \sb240\sa240\ql\plain\f0\fs24\plain\f0\fs20\ynhx0988\hich\f0\dbch\f0\loch\f0\fs20 Blood Gas Profile - Arterial (08.13.23 @ 01:44) \par  pH, Arterial: 7.35: No collection time indicated, please interpret with caution\line pCO2, Arterial: 47 mmHg\line pO2, Arterial: 89 mmHg\line HCO3, Arterial: 26 mmol/L\line Base Excess, Arterial: 0.0 mmol/L\line Oxygen Saturation, Arterial: 97.5 %\line   Total CO2, Arterial: 27 mmol/L\line FIO2, Arterial: 30\plain\f1\fs16\wgpc8970\hich\f1\dbch\f1\loch\f1\cf2\fs16\par  \line\plain\f0\fs20\rofd1978\hich\f0\dbch\f0\loch\f0\fs20 Blood Gas Arterial - Calcium, Ionized: 1.19 mmol/L (08.13.23 @ 01:44) \par  \pard\plain\f0\fs24\plain\f0\fs20\jjyo6330\hich\f0\dbch\f0\loch\f0\fs20\par  \par  TELEMETRY: \tab SR  \par  ECG:  \tab\par  RADIOLOGY: < from: Xray Chest 1 View- PORTABLE-Urgent (Xray Chest 1 View- PORTABLE-Urgent .) (08.13.23 @ 07:09) >\par  \ql\plain\f0\fs24\plain\f1\fs16\ivde2929\hich\f1\dbch\f1\loch\f1\cf2\fs16\par  ******PRELIMINARY REPORT******  \par  \par  ******PRELIMINARY REPORT****** \par  \par  \par  \par  ACC: [id number] EXAM:  XR CHEST PORTABLE URGENT 1V   ORDERED BY: NATTY \par  NIECY ACKERMAN \par  \par  PROCEDURE DATE:  08/13/2023  \par  ******PRELIMINARY REPORT******  \par  \par  ******PRELIMINARY REPORT****** \par  \par  \par  \par  \par  INTERPRETATION:  Left IJ central venous catheter with tip in the distal \par  left brachiocephalic vein/proximal SVC.\par  \par  \par  \pard\plain\f0\fs24\plain\f1\fs16\xljm8756\hich\f1\dbch\f1\loch\f1\cf2\fs16\par  \par  < end of copied text >\par  \plain\f0\fs20\vmej1221\hich\f0\dbch\f0\loch\f0\fs20\par  DIAGNOSTIC TESTING:\par  [ ] Echocardiogram:\par  [ ]  Catheterization:\par  [ ] Stress Test:  \par  OTHER: \tab\par  \ql\plain\f0\fs24{\*\bkmkstart [id number]}{\*\bkmkend [id number]}\plain\f1\fs16\uduf6410\hich\f1\dbch\f1\loch\f1\cf2\fs16\b\ul TECH INFORMATION:\plain\f1\fs16\ktxx9627\hich\f1\dbch\f1\loch\f1\cf2\fs16  \par  {\*\bkmkstart [id number]}{\*\bkmkend [id number]}This is a {\*\bkmkstart [id number]}{\*\bkmkend [id number]}Continuous Video EEG. \par  \par  {\*\bkmkstart [id number]}{\*\bkmkend [id number]}Recording Technique: {\*\bkmkstart [id number]}{\*\bkmkend [id number]}The patient underwent continuous video-EEG monitoring, using Telemetry System hardware on the [employer].  EEG and   video data were stored on a computer hard drive with important events saved in digital archive files. {\*\bkmkstart [id number]}{\*\bkmkend [id number]}The material was reviewed by a physician (electroencephalographer/epileptologist) on a daily basis.    Juan Jose and seizure detection algorithms were utilized and reviewed.  An EEG Technician attended to the patient for 8 to 10 hours per day. {\*\bkmkstart [id number]}{\*\bkmkend [id number]}The patient was observed by the epilepsy nursing staff 24 hours   per day. The epilepsy center neurologist was available in person or on call 24 hours per day.. \plain\f1\fs16\oqfu4719\hich\f1\dbch\f1\loch\f1\cf2\fs16\strike\plain\f1\fs16\wumi3286\hich\f1\dbch\f1\loch\f1\cf2\fs16\par  \par  {\*\bkmkstart [id number]}{\*\bkmkend [id number]}\plain\f1\fs16\awcr0009\hich\f1\dbch\f1\loch\f1\cf2\fs16\b\ul EEG REPORT:\plain\f1\fs16\pjff0421\hich\f1\dbch\f1\loch\f1\cf2\fs16  \par  \plain\f1\fs16\fqtc4039\hich\f1\dbch\f1\loch\f1\cf2\fs16\b\ul{\field{\*\fldinst HYPERLINK [id number] }{\fldrslt EEG Report:}}\plain\f1\fs16\kswm3904\hich\f1\dbch\f1\loch\f1\cf2\fs16\ql\par  \trowd\tyduyh13\lastrow\loepduw75\trpaddfl3\nocbypi68\trpaddfr3\trpaddt0\trpaddft3\trpaddb0\trpaddfb3\trleft0  \clvertalt\nsavpf93\clpadft3\bfaoux85\clpadfr3\clpadl0\clpadfl3\clpadb0\clpadfb3\cgcmw7434  \clvertalt\ipjyvc18\clpadft3\mjydry82\clpadfr3\clpadl0\clpadfl3\clpadb0\clpadfb3\irpya6005  \pard\intbl\ssparaaux0\s0\fi-120\li120\ql\plain\f0\fs24{\*\bkmkstart [id number]}{\*\bkmkend [id number]}\plain\f1\fs16\ygum1836\hich\f1\dbch\f1\loch\f1\cf2\fs16 \'b7 \plain\f1\fs16\rukq9239\hich\f1\dbch\f1\loch\f1\cf2\fs16\b EEG Report\plain\f1\fs16\iejp3879\hich\f1\dbch\f1\loch\f1\cf2\fs16\cell  \pard\intbl\ssparaaux0\s0\ql\plain\f0\fs24\plain\f1\fs16\pyfn7060\hich\f1\dbch\f1\loch\f1\cf2\fs16\cell  \intbl\row  \pard\ssparaaux0\s0\ql\plain\f0\fs24\plain\f1\fs16\mzvp3785\hich\f1\dbch\f1\loch\f1\cf2\fs16 MILANA BALL [id number] \par  \par  Study Date: \tab 08-11-23 1850\tab 08-12-23 0800\par  Duration x Hours: 13 hours\par  --------------------------------------------------------------------------------------------------\par  History:\par  CC/ HPI Patient is a 73y old  Female who presents with a chief complaint of Respiratory distress (12 Aug 2023 10:42)\par  \par  MEDICATIONS  (STANDING):\par  albuterol/ipratropium for Nebulization 3 milliLiter(s) Nebulizer every 6 hours\par  aspirin  chewable 81 milliGRAM(s) Oral daily\par  atorvastatin 40 milliGRAM(s) Oral at bedtime\par  buMETAnide Injectable 2 milliGRAM(s) IV Push once\par  cefepime   IVPB 1000 milliGRAM(s) IV Intermittent every 24 hours\par  chlorhexidine 0.12% Liquid 15 milliLiter(s) Oral Mucosa every 12 hours\par  chlorhexidine 2% Cloths 1 Application(s) Topical daily\par  dextrose 5%. 1000 milliLiter(s) (50 mL/Hr) IV Continuous <Continuous>\par  dextrose 5%. 1000 milliLiter(s) (100 mL/Hr) IV Continuous <Continuous>\par  dextrose 50% Injectable 12.5 Gram(s) IV Push once\par  dextrose 50% Injectable 25 Gram(s) IV Push once\par  dextrose 50% Injectable 25 Gram(s) IV Push once\par  dextrose 50% Injectable 25 milliLiter(s) IV Push once\par  glucagon  Injectable 1 milliGRAM(s) IntraMuscular once\par  heparin   Injectable 5000 Unit(s) SubCutaneous every 8 hours\par  insulin lispro (ADMELOG) corrective regimen sliding scale   SubCutaneous every 6 hours\par  insulin NPH human recombinant 3 Unit(s) SubCutaneous every 6 hours\par  insulin regular  human recombinant 5 Unit(s) IV Push once\par  norepinephrine Infusion 0.08 MICROgram(s)/kG/Min (4.99 mL/Hr) IV Continuous <Continuous>\par  propofol Infusion 10 MICROgram(s)/kG/Min (3.99 mL/Hr) IV Continuous <Continuous>\par  sodium chloride 3%  Inhalation 4 milliLiter(s) Inhalation every 6 hours\par  \par  --------------------------------------------------------------------------------------------------\par  Study Interpretation:\par  \par  [[[Abbreviation Key:  PDR=alpha rhythm/posterior dominant rhythm. A-P=anterior posterior.  Amplitude: \u8216 ?very low\u8217 ?:<20; \u8216 ?low\u8217 ?:20-49; \u8216 ?medium\u8217 ?:; \u8216 ?high\u8217 ?:>150uV.  Persistence for periodic/rhythmic   patterns (% of epoch) \u8216 ?rare\u8217 ?:<1%; \u8216 ?occasional\u8217 ?:1-10%; \u8216 ?frequent\u8217 ?:10-50%; \u8216 ?abundant\u8217 ?:50-90%; \u8216 ?continuous\u8217 ?:>90%.  Persistence for sporadic discharges: \u8216 ?rare\u8217 ?:<1/hr; \u8216   ?occasional\u8217 ?:1/min-1/hr; \u8216 ?frequent\u8217 ?:>1/min; \u8216 ?abundant\u8217 ?:>1/10 sec.  RPP=rhythmic and periodic patterns; GRDA=generalized rhythmic delta activity; FIRDA=frontal intermittent GRDA; LRDA=lateralized rhythmic delta activity;   TIRDA=temporal intermittent rhythmic delta activity;  LPD=PLED=lateralized periodic discharges; GPD=generalized periodic discharges; BIPDs =bilateral independent periodic discharges; Mf=multifocal; SIRPDs=stimulus induced rhythmic, periodic, or ictal   appearing discharges; BIRDs=brief potentially ictal rhythmic discharges >4 Hz, lasting .5-10s; PFA (paroxysmal bursts >13 Hz or =8 Hz <10s).  Modifiers: +F=with fast component; +S=with spike component; +R=with rhythmic component.  S-B=burst suppression   pattern.  Max=maximal. N1-drowsy; N2-stage II sleep; N3-slow wave sleep. SSS/BETS=small sharp spikes/benign epileptiform transients of sleep. HV=hyperventilation; PS=photic stimulation]]]\par  \par  Daily EEG Visual Analysis\par  \par  FINDINGS:  \par  \par  Background:\par  Continuity: discontinuous\par  Symmetry: symmetric\par  PDR: non.\par  Reactivity: absent\par  Voltage: normal\par  Anterior Posterior Gradient: Absent\par  Other background findings: none\par  Breach: absent\par  \par  Background Slowing:\par  Generalized slowing: Diffuse polymorphic delta/theta slowing \par  Focal slowing: none was present.\par  \par  State Changes: \par  -Absent\par  \par  Sporadic Epileptiform Discharges:  \par  None\par  \par  Rhythmic and Periodic Patterns (RPPs):\par  None \par  \par  Electrographic and Electroclinical seizures:\par  None\par  \par  Other Clinical Events:\par  @1907 - reported push button for shaking. (video not clear). No EEG correlate seen. \par  \par  Activation Procedures: \par  -Hyperventilation was not performed.  \par  -Photic stimulation was not performed.\par  \par  Artifacts:\par  Intermittent myogenic and movement artifacts were noted.\par  \par  ECG:\par  The heart rate on single channel ECG was predominantly between 100 to 110 BPM.\par  \par  EEG Classification / Summary:\par  Abnormal  EEG in lethargic patient.\par  - Severe background slowing with discontinuity\par  \par  Clinical Impression:\par  - Severe diffuse non-specific cerebral dysfunction\par  - One push button event for shaking with no EEG correlate. \par  - There were no epileptiform abnormalities or seizures recorded.  \par  \par  -------------------------------------------------------------------------------------------------------\par  [facility] EEG Reading Room Ph#: [phone]\par  Epilepsy Answering Service after 5PM and before 8:30AM: Ph#: [phone]\par  \par  \plain\f1\fs16\qeqq9654\hich\f1\dbch\f1\loch\f1\cf2\fs16\strike\plain\f1\fs16\aomq0689\hich\f1\dbch\f1\loch\f1\cf2\fs16\plain\f1\fs16\ewhn3251\hich\f1\dbch\f1\loch\f1\cf2\fs16\par  \par  {\*\bkmkstart bkClinDocSignatures}{\*\bkmkend bkClinDocSignatures}{\*\bkmkstart bkcommentSBK}{\*\bkmkend bkcommentSBK}\plain\f1\fs16\vgsa3647\hich\f1\dbch\f1\loch\f1\cf2\fs16\b Electronic Signatures:\plain\f1\fs16\hequ8286\hich\f1\dbch\f1\loch\f1\cf2\fs16\par  \plain\f1\fs16\sroq2682\hich\f1\dbch\f1\loch\f1\cf2\fs16\b\ul Narapureddy, Iris (MD)\plain\f1\fs16\otfn5446\hich\f1\dbch\f1\loch\f1\cf2\fs16   \plain\f1\fs18\pthi5006\hich\f1\dbch\f1\loch\f1\cf2\fs18 (Signed 12-Aug-2023 14:00)\par  \fi-360\li720\plain\f0\fs24\plain\f1\fs18\zzel2557\hich\f1\dbch\f1\loch\f1\cf2\fs18\tab\plain\f1\fs16\lmiz3845\hich\f1\dbch\f1\loch\f1\cf2\fs16\b\i Authored: \plain\f1\fs16\ykdu7688\hich\f1\dbch\f1\loch\f1\cf2\fs16\i TECH INFORMATION, EEG REPORT\plain\f1\fs16\pdlf6750\hich\f1\dbch\f1\loch\f1\cf2\fs16\par  \fi0\li0\plain\f0\fs24\plain\f1\fs16\msza6696\hich\f1\dbch\f1\loch\f1\cf2\fs16\b\ul Proteasa, Sarah (MD)\plain\f1\fs16\guop7136\hich\f1\dbch\f1\loch\f1\cf2\fs16   \plain\f1\fs18\vpwk6548\hich\f1\dbch\f1\loch\f1\cf2\fs18 (Signed 12-Aug-2023 17:02)\par  \fi-360\li720\plain\f0\fs24\plain\f1\fs18\bwli3353\hich\f1\dbch\f1\loch\f1\cf2\fs18\tab\plain\f1\fs16\zzuo1077\hich\f1\dbch\f1\loch\f1\cf2\fs16\b\i Authored: \plain\f1\fs16\vqwk3446\hich\f1\dbch\f1\loch\f1\cf2\fs16\i TECH INFORMATION, EEG REPORT\plain\f1\fs16\muhx4455\hich\f1\dbch\f1\loch\f1\cf2\fs16\par  \tab\plain\f1\fs16\oixh6754\hich\f1\dbch\f1\loch\f1\cf2\fs16\b\i Co-Signer: \plain\f1\fs16\phph8923\hich\f1\dbch\f1\loch\f1\cf2\fs16\i TECH INFORMATION, EEG REPORT\plain\f1\fs16\yvke0927\hich\f1\dbch\f1\loch\f1\cf2\fs16\par  \fi0\li0\plain\f0\fs24\plain\f1\fs16\fyxq5008\hich\f1\dbch\f1\loch\f1\cf2\fs16\par  \par  \plain\f1\fs16\acwd9894\hich\f1\dbch\f1\loch\f1\cf2\fs16\b\i Last Updated: \plain\f1\fs16\gbyw6645\hich\f1\dbch\f1\loch\f1\cf2\fs16\i 12-Aug-2023 17:02 by Sarah Pollock (MD)\plain\f0\fs20\kvpi9741\hich\f0\dbch\f0\loch\f0\fs20\par  \pard\plain\f0\fs24\plain\f0\fs20\kppn0638\hich\f0\dbch\f0\loch\f0\fs20\par  \par  \par  \ql\plain\f0\fs24\plain\f0\fs20\izyq3775\hich\f0\dbch\f0\loch\f0\fs20\par  }

## 2023-08-13 NOTE — PROGRESS NOTE ADULT - ATTENDING COMMENTS
Acute hypoxemic and hypercapnic respiratory failure due to Staphylococcus aureus cavitary pneumonia + acute decompensated diastolic heart failure. Intubated 8/11. Continue lung protective ventilation. Wean down sedation with propofol to initiate spontaneous breathing trials. Hypercapnia improved on repeat ABG's. Dose vancomycin 1 gm IV today. Continue cefepime, but consider changing to cefazolin if final endotracheal culture only with MSSA. Airway clearance therapy with albuterol, ipratropium, and hypertonic saline nebs as well as IPV.    ADHF and RUSS on CKD likely due to ATN. Not responding to pushes of furosemide or bumetanide. Bedside ultrasound with A-lines anteriorly today and indeterminate IVC 1.5 cm. S/p LR 1 liter bolus and albumin 5% 250 mL. Trial of bumetanide infusion started this afternoon with albumin 25% 50 mL q6h. Does not require urgent dialysis at this time. Strict I/O's, close monitoring of kidney function and lytes. Hyperkalemia improved. Follow-up renal.    No further seizures. EEG without seizure activity, but with diffuse slowing. Taper off propofol for sedation vacation. Monitor mental status. Continue aspirin and statin for h/o CVA.    Nepro tube feeds as tolerates. DM2, a1c 7.1, continue NPH + insulin coverage scale.    Overall prognosis guarded, discussed with  and daughter at bedside.    CC time spent: 35 min

## 2023-08-13 NOTE — PROGRESS NOTE ADULT - SUBJECTIVE AND OBJECTIVE BOX
Patient is a 73y old  Female who presents with a chief complaint of Respiratory distress (13 Aug 2023 13:28)      SUBJECTIVE / OVERNIGHT EVENTS: remains intubated in MICU, oliguric RUSS 2/2 ATN, now on BUMEX drip, renal following, no neew for HD. sedated, remains on pressors,     MEDICATIONS  (STANDING):  albumin human 25% IVPB 50 milliLiter(s) IV Intermittent every 8 hours  albuterol/ipratropium for Nebulization 3 milliLiter(s) Nebulizer every 6 hours  aspirin  chewable 81 milliGRAM(s) Oral daily  atorvastatin 40 milliGRAM(s) Oral at bedtime  buMETAnide Infusion 1 mG/Hr (5 mL/Hr) IV Continuous <Continuous>  buMETAnide IVPB 4 milliGRAM(s) IV Intermittent once  chlorhexidine 0.12% Liquid 15 milliLiter(s) Oral Mucosa every 12 hours  chlorhexidine 2% Cloths 1 Application(s) Topical daily  dextrose 5%. 1000 milliLiter(s) (100 mL/Hr) IV Continuous <Continuous>  dextrose 5%. 1000 milliLiter(s) (50 mL/Hr) IV Continuous <Continuous>  dextrose 50% Injectable 12.5 Gram(s) IV Push once  dextrose 50% Injectable 25 Gram(s) IV Push once  dextrose 50% Injectable 25 Gram(s) IV Push once  glucagon  Injectable 1 milliGRAM(s) IntraMuscular once  heparin   Injectable 5000 Unit(s) SubCutaneous every 8 hours  insulin lispro (ADMELOG) corrective regimen sliding scale   SubCutaneous every 6 hours  insulin NPH human recombinant 5 Unit(s) SubCutaneous every 6 hours  midodrine 10 milliGRAM(s) Oral every 8 hours  moxifloxacin 400 milliGRAM(s) Oral daily  norepinephrine Infusion 0.08 MICROgram(s)/kG/Min (4.99 mL/Hr) IV Continuous <Continuous>  petrolatum Ophthalmic Ointment 1 Application(s) Both EYES every 12 hours  propofol Infusion 10 MICROgram(s)/kG/Min (3.99 mL/Hr) IV Continuous <Continuous>  sodium chloride 3%  Inhalation 4 milliLiter(s) Inhalation every 6 hours  vasopressin Infusion 0.04 Unit(s)/Min (6 mL/Hr) IV Continuous <Continuous>    MEDICATIONS  (PRN):  acetaminophen   Oral Liquid .. 650 milliGRAM(s) Oral every 6 hours PRN Temp greater or equal to 38C (100.4F), Mild Pain (1 - 3)  dextrose Oral Gel 15 Gram(s) Oral once PRN Blood Glucose LESS THAN 70 milliGRAM(s)/deciliter      Vital Signs Last 24 Hrs  T(F): 100.5 (08-13-23 @ 16:00), Max: 101.1 (08-12-23 @ 20:00)  HR: 85 (08-13-23 @ 18:00) (76 - 113)  BP: --  RR: 14 (08-13-23 @ 18:00) (14 - 128)  SpO2: 100% (08-13-23 @ 18:00) (97% - 100%)  Telemetry:   CAPILLARY BLOOD GLUCOSE      POCT Blood Glucose.: 250 mg/dL (13 Aug 2023 17:34)  POCT Blood Glucose.: 291 mg/dL (13 Aug 2023 13:05)  POCT Blood Glucose.: 254 mg/dL (13 Aug 2023 05:40)  POCT Blood Glucose.: 261 mg/dL (13 Aug 2023 01:34)  POCT Blood Glucose.: 298 mg/dL (13 Aug 2023 01:33)    I&O's Summary    12 Aug 2023 07:01  -  13 Aug 2023 07:00  --------------------------------------------------------  IN: 1608 mL / OUT: 267 mL / NET: 1341 mL    13 Aug 2023 07:01  -  13 Aug 2023 19:30  --------------------------------------------------------  IN: 1314.4 mL / OUT: 95 mL / NET: 1219.4 mL        PHYSICAL EXAM:  GENERAL: NAD, well-developed  HEAD:  Atraumatic, Normocephalic  EYES: EOMI, PERRLA, conjunctiva and sclera clear  NECK: Supple, No JVD  CHEST/LUNG: Clear to auscultation bilaterally; No wheeze  HEART: Regular rate and rhythm; No murmurs, rubs, or gallops  ABDOMEN: Soft, Nontender, Nondistended; Bowel sounds present  EXTREMITIES:  2+ Peripheral Pulses, No clubbing, cyanosis, or edema  PSYCH: AAOx3  NEUROLOGY: non-focal  SKIN: No rashes or lesions    LABS:                        8.1    14.11 )-----------( 280      ( 13 Aug 2023 11:10 )             26.7     08-13    134<L>  |  99  |  56<H>  ----------------------------<  257<H>  4.7   |  24  |  3.72<H>    Ca    8.3<L>      13 Aug 2023 11:10  Phos  4.9     08-13  Mg     2.80     08-13    TPro  5.5<L>  /  Alb  1.8<L>  /  TBili  <0.2  /  DBili  x   /  AST  14  /  ALT  12  /  AlkPhos  115  08-13    PT/INR - ( 12 Aug 2023 00:30 )   PT: 13.4 sec;   INR: 1.19 ratio         PTT - ( 12 Aug 2023 00:30 )  PTT:22.9 sec      Urinalysis Basic - ( 13 Aug 2023 11:10 )    Color: x / Appearance: x / SG: x / pH: x  Gluc: 257 mg/dL / Ketone: x  / Bili: x / Urobili: x   Blood: x / Protein: x / Nitrite: x   Leuk Esterase: x / RBC: x / WBC x   Sq Epi: x / Non Sq Epi: x / Bacteria: x        RADIOLOGY & ADDITIONAL TESTS:    Imaging Personally Reviewed:    Consultant(s) Notes Reviewed:      Care Discussed with Consultants/Other Providers:

## 2023-08-14 NOTE — EEG REPORT - NS EEG TEXT BOX
MILANA BALL [id number]     Study Date: 	08-13-23 0800	08-14-23 0800  Duration x Hours: 24 hours  --------------------------------------------------------------------------------------------------  History:  CC/ HPI Patient is a 73y old  Female who presents with a chief complaint of Respiratory distress (12 Aug 2023 10:42)    --------------------------------------------------------------------------------------------------  Study Interpretation:    [[[Abbreviation Key:  PDR=alpha rhythm/posterior dominant rhythm. A-P=anterior posterior.  Amplitude: ‘very low’:<20; ‘low’:20-49; ‘medium’:; ‘high’:>150uV.  Persistence for periodic/rhythmic patterns (% of epoch) ‘rare’:<1%; ‘occasional’:1-10%; ‘frequent’:10-50%; ‘abundant’:50-90%; ‘continuous’:>90%.  Persistence for sporadic discharges: ‘rare’:<1/hr; ‘occasional’:1/min-1/hr; ‘frequent’:>1/min; ‘abundant’:>1/10 sec.  RPP=rhythmic and periodic patterns; GRDA=generalized rhythmic delta activity; FIRDA=frontal intermittent GRDA; LRDA=lateralized rhythmic delta activity; TIRDA=temporal intermittent rhythmic delta activity;  LPD=PLED=lateralized periodic discharges; GPD=generalized periodic discharges; BIPDs =bilateral independent periodic discharges; Mf=multifocal; SIRPDs=stimulus induced rhythmic, periodic, or ictal appearing discharges; BIRDs=brief potentially ictal rhythmic discharges >4 Hz, lasting .5-10s; PFA (paroxysmal bursts >13 Hz or =8 Hz <10s).  Modifiers: +F=with fast component; +S=with spike component; +R=with rhythmic component.  S-B=burst suppression pattern.  Max=maximal. N1-drowsy; N2-stage II sleep; N3-slow wave sleep. SSS/BETS=small sharp spikes/benign epileptiform transients of sleep. HV=hyperventilation; PS=photic stimulation]]]    Daily EEG Visual Analysis    FINDINGS:      Background:  Continuity: discontinuous  Symmetry: symmetric  PDR: non.  Reactivity: absent  Voltage: normal  Anterior Posterior Gradient: Absent  Other background findings: none  Breach: absent    Background Slowing:  Generalized slowing: Diffuse polymorphic delta/theta slowing, intermittently with a triphasic appearance.   Focal slowing: none was present.    State Changes:   -Absent    Sporadic Epileptiform Discharges:    None    Rhythmic and Periodic Patterns (RPPs):  None     Electrographic and Electroclinical seizures:  None    Other Clinical Events:  none    Activation Procedures:   -Hyperventilation was not performed.    -Photic stimulation was not performed.    Artifacts:  Intermittent myogenic and movement artifacts were noted.    ECG:  The heart rate on single channel ECG was predominantly between 100 to 110 BPM.    EEG Classification / Summary:  Abnormal EEG in lethargic patient.  - Severe background slowing with discontinuity    Clinical Impression:  - Severe diffuse non-specific cerebral dysfunction  - There were no epileptiform abnormalities or seizures recorded.      **In absence of additional clinical concerns, recommend consideration for discontinuation of current EEG study with reconnection in future if warranted.    Evens Akhtar MD  EEG/Epilepsy Attending     -------------------------------------------------------------------------------------------------------  [facility] EEG Reading Room Ph#: [phone]  Epilepsy Answering Service after 5PM and before 8:30AM: Ph#: [phone]

## 2023-08-14 NOTE — PROGRESS NOTE ADULT - ASSESSMENT
74 y/o F DM on insulin, HTN, CKD, CHFpEF, breast CA, respiratory arrest and cardiac arrest (2018), CVA with residual weakness, aspiration PNA h/o trache, PEG, both removed presents with respiratory distress requiring intubation. May volume overload i/s/o CHF/CKD vs pneumonia with thick secretions.     Neuro   #Sedated   Baseline MS AOx3   On prop for sedation. Wean for mental status assessment - had eye opening per family endorsement on evening 8/12    #CVA   - aspirin and statin     Cardiac   #Shock  - Likely mixed septic and vasoplegic from prop  - On levo, added vaso in order to wean down levo  - Adding midodrine 10mg q8hr for further weaning  - holding home coreg 37.5 BID    #CHFpEF   TTE 7/2023 with EF 59%, with severe LVH and diastolic dysfunction   pro-BNP > 99955, trop 78   Repeat TTE with EF 60% normal systolic and grade 1 diastolic dysfunction   - holding home coreg 37.5 BID     Pulmonary   #Acute hypercapnic and hypoxemic respiratory failure   DDx: aspiration vs volume overload   VBG with respiratory acidosis pCO2 111  CXR with small right pleural effusion, no consolidations/opacities  - c/w mechanical ventilation   - monitor blood gas while on vent      /Renal   #RUSS  - Rising creatinine, appears to now be plateauing at this time. Likely ATN in setting of sepsis  - Continue to monitor, dose meds per eGFR. avoid nephrotoxic agents  - No acute HD needs at this time    #Hyperkalemia with EKG changes    7.2, hemolyzed, given insulin and D50 + calcium gluc   Likely i/s/o renal dysfunction   Received lasix 80 and 40 IV initially but without adequate response   Given bumex 2mg the morning of 8/12  Per nephro, hold off on additional diuresis   - follow up BMP q12   - Improved AM of 8/13/23    GI  Diet: NPO with tube feeds     Endocrine  #T2DM   A1C 7.1 in 7/2023   - Increased NPH to 5U q6 from 3U q6 given still elevated blood sugars   - BG goal 140-200     ID   No fever/leukocytosis, recheck temp   Hx latent TB which was treated, no concern for TB   Started on empiric vancomycin and aztreonam (cefepime/zosyn allergy? developed rash req prednisone)   - c/w vanc by level. Last dose of vanc on 8/13/23, 1000mg. Follow up next random level on 8/14 for redose if needed based on staph aureus data from sputum  - DC cefepime 8/13  - If sputum shows MSSA, will switch to ancef  - MSSA/MRSA nares swab negative, but showing staph aureus in her sputum   - Blood and urine cultures demonstrating no growth currently    Heme/Onc  ppx: heparin q8 hrs     Ethics  GOC - Per previous GOC with daughter and , reported that they have not talked about end of life care with the patient. For now, they wanted "everything to be done" including CPR, continuing with mech ventilation/intubation, pressors. Will re-address over course           74 y/o F DM on insulin, HTN, CKD, CHFpEF, breast CA, respiratory arrest and cardiac arrest (2018), CVA with residual weakness, aspiration PNA h/o trache, PEG, both removed presents with respiratory distress requiring intubation. May volume overload i/s/o CHF/CKD vs decrease respiratory drive (given oxygen at home).     Neuro   #Metabolic encephalopathy   Baseline MS AOx3   CT head without acute change  Previously on prop and fentanyl which were weaned  Still not responsive to commands   Spoke with neurology, not inclined to get LP   - CT head if acute change     #CVA   - c/w aspirin and statin     Cardiac   #Shock  - Likely mixed septic and vasoplegic from prop  - weaned vaso and levo, now HDS  - c/w midodrine 10mg q8hr for further weaning  - holding home coreg 37.5 BID    #CHFpEF   TTE 7/2023 with EF 59%, with severe LVH and diastolic dysfunction   pro-BNP > 74118, trop 78   Repeat TTE with EF 60% normal systolic and grade 1 diastolic dysfunction   - holding home coreg 37.5 BID     Pulmonary   #Acute hypercapnic and hypoxemic respiratory failure   DDx: aspiration vs volume overload vs sedating medication decreasing drive (was given nyquil)   VBG with respiratory acidosis pCO2 111  CXR with small right pleural effusion, no consolidations/opacities  - c/w mechanical ventilation   - monitor blood gas while on vent      /Renal   #RUSS on CKD   Ddx: obstructive (willard in, no hydro on POCUS) vs low flow state vs AIN i/s/o cephalosporin use and drug rash   - Rising creatinine, appears to now be plateauing at this time. Likely ATN in setting of sepsis  - Continue to monitor, dose meds per eGFR. avoid nephrotoxic agents  - No acute HD needs at this time  - nephrology recs   - repeat U/A for pyuria/casts     #Hyperkalemia with EKG changes    7.2, hemolyzed, given insulin and D50 + calcium gluc   Likely i/s/o renal dysfunction   Received lasix 80 and 40 IV initially but without adequate response   Given bumex 2mg the morning of 8/12  Per nephro, started on bumex gtt with worsening creatinine   - decreasing urine output, now oliguric   - d/c bumex 8/14      GI  Diet: NPO with tube feeds     Endocrine  #T2DM   A1C 7.1 in 7/2023   - c/w NPH to 5U q6  - BG goal 140-200     ID   No fever/leukocytosis, recheck temp   Hx latent TB which was treated, no concern for TB   Started on empiric vancomycin and aztreonam (cefepime/zosyn allergy? developed rash req prednisone)   Trialed cefepime --> cefazolin (sputum MSSA), developed drug eruption   - Blood and urine cultures demonstrating no growth currently  - monitor off antibx if afebrile     Heme/Onc  ppx: heparin q8 hrs     Ethics  GOC - Per previous GOC with daughter and , reported that they have not talked about end of life care with the patient. For now, they wanted "everything to be done" including CPR, continuing with mech ventilation/intubation, pressors. Will re-address

## 2023-08-14 NOTE — PROGRESS NOTE ADULT - ASSESSMENT
72 y/o F well known to me from my [facility]. she was admitted at [facility] 7/12-7/22 w aspiration PNA, was treated w CEFEPIME, developed an allergic rash,  dCHF, + MAC on AFB culture, had been progressively getting more and more lethargic and dyspneic at home since DC.   In  am of 8/11/23  ptn presented with respiratory distress w hypoxia and hypercarbia requiring intubation 2/2 volume overload +/- Asp PNA      Neuro   off sedation  well known to Dr. Bianchi, consult reviewed   Baseline MS AOx3, aphasic   - h/o CVA , on aspirin and statin . resumed w feeding tube  - eeg  2/2 tremors, no sz focus    Cardiac   Ptn well known to Dr. Dunn, consult reviewed   CHFpEF   TTE 7/2023 with EF 59%, with severe LVH and diastolic dysfunction   pro-BNP > 22789, trop 78       Pulmonary   Acute hypercapnic and hypoxemic respiratory failure   well known to Dr. Mcnulty, consult reviewed   DDx: aspiration vs volume overload   VBG with respiratory acidosis pCO2 111  CT chest: mod ( worsening) R pl effusion  - cefepime DCed on 8/13, started on Moxifloxacin on 8/13, on 8/14 off ABx    Renal   Hyperkalemia with EKG changes  , initially treated w LOKELMA,  now resolved   Ptn well know to Dr. Colon, consult reviewed   7.2, hemolyzed, given insulin and D50 + calcium gluc    oliguric RUSS 2/2 ATN, now on BUMEX drip, no neew for HD.       GI  NPO for now, on tube feeds  would start GI ppx w IV PPI    Endocrine  T2DM   A1C 7.1 in 7/2023   - BG goal 140-200     ID   No fever/leukocytosis, recheck temp   Hx latent TB which was treated, no concern for TB   - grew MAC on AFB culture from most recent admission. would call ID consult  Started on empiric vancomycin and aztreonam,  changed to cefepime 8/12, cefepime dced on 8/13(cefepime/zosyn allergy.  developed rash when on cefepime on previous admission ) . started on AVALOX on 8/13, off ABx on 8/14  - f/u MRSA   - follow up blood culture/urine     Heme/Onc  ppx: heparin q8 hrs     Ethics  GOC - Discussed GOC with daughter and , they have opted in the past for full code. and she remains full code at present

## 2023-08-14 NOTE — PROGRESS NOTE ADULT - PROBLEM SELECTOR PLAN 1
Multifactorial   Aspiration, acute on chronic diastolic CHF   Cont with IV abx   Bumex gtt as ordered. Monitor UO   Ventilatory and hemodynamic support

## 2023-08-14 NOTE — PROGRESS NOTE ADULT - SUBJECTIVE AND OBJECTIVE BOX
INTERVAL HPI/OVERNIGHT EVENTS:    SUBJECTIVE: Patient seen and examined at bedside. Patient intubated and sedated.       OBJECTIVE:    VITAL SIGNS:  ICU Vital Signs Last 24 Hrs  T(C): 37 (14 Aug 2023 04:00), Max: 38.2 (13 Aug 2023 20:00)  T(F): 98.6 (14 Aug 2023 04:00), Max: 100.7 (13 Aug 2023 20:00)  HR: 92 (14 Aug 2023 06:30) (76 - 109)  BP: --  BP(mean): --  ABP: 108/29 (14 Aug 2023 06:30) (91/29 - 180/52)  ABP(mean): 57 (14 Aug 2023 06:30) (49 - 96)  RR: 14 (14 Aug 2023 06:30) (14 - 128)  SpO2: 100% (14 Aug 2023 06:30) (99% - 100%)    O2 Parameters below as of 14 Aug 2023 03:24  Patient On (Oxygen Delivery Method): ventilator          Mode: AC/ CMV (Assist Control/ Continuous Mandatory Ventilation), RR (machine): 14, TV (machine): 360, FiO2: 30, PEEP: 5, ITime: 0.77, MAP: 10, PIP: 35    08-13 @ 07:01  -  08-14 @ 07:00  --------------------------------------------------------  IN: 2229 mL / OUT: 360 mL / NET: 1869 mL      CAPILLARY BLOOD GLUCOSE      POCT Blood Glucose.: 189 mg/dL (14 Aug 2023 05:48)      PHYSICAL EXAM:    General: NAD, intubated, not responsive to pain or commands  HEENT: NC/AT; PERRL, clear conjunctiva  Respiratory: CTA b/l  Cardiovascular: +S1/S2; RRR  Abdomen: soft, NT/ND; +BS x4  Extremities: WWP, 2+ peripheral pulses b/l; improved edema  Skin: normal color and turgor; no rash      MEDICATIONS:  MEDICATIONS  (STANDING):  albumin human 25% IVPB 50 milliLiter(s) IV Intermittent every 6 hours  albuterol/ipratropium for Nebulization 3 milliLiter(s) Nebulizer every 6 hours  aspirin  chewable 81 milliGRAM(s) Oral daily  atorvastatin 40 milliGRAM(s) Oral at bedtime  buMETAnide Infusion 2 mG/Hr (10 mL/Hr) IV Continuous <Continuous>  ceFAZolin   IVPB 1000 milliGRAM(s) IV Intermittent every 12 hours  chlorhexidine 0.12% Liquid 15 milliLiter(s) Oral Mucosa every 12 hours  chlorhexidine 2% Cloths 1 Application(s) Topical daily  dextrose 5%. 1000 milliLiter(s) (100 mL/Hr) IV Continuous <Continuous>  dextrose 5%. 1000 milliLiter(s) (50 mL/Hr) IV Continuous <Continuous>  dextrose 50% Injectable 25 Gram(s) IV Push once  dextrose 50% Injectable 25 Gram(s) IV Push once  dextrose 50% Injectable 12.5 Gram(s) IV Push once  glucagon  Injectable 1 milliGRAM(s) IntraMuscular once  heparin   Injectable 5000 Unit(s) SubCutaneous every 8 hours  insulin lispro (ADMELOG) corrective regimen sliding scale   SubCutaneous every 6 hours  insulin NPH human recombinant 5 Unit(s) SubCutaneous every 6 hours  midodrine 10 milliGRAM(s) Oral every 8 hours  norepinephrine Infusion 0.08 MICROgram(s)/kG/Min (4.99 mL/Hr) IV Continuous <Continuous>  petrolatum Ophthalmic Ointment 1 Application(s) Both EYES every 12 hours  sodium chloride 3%  Inhalation 4 milliLiter(s) Inhalation every 6 hours    MEDICATIONS  (PRN):  acetaminophen   Oral Liquid .. 650 milliGRAM(s) Oral every 6 hours PRN Temp greater or equal to 38C (100.4F), Mild Pain (1 - 3)  dextrose Oral Gel 15 Gram(s) Oral once PRN Blood Glucose LESS THAN 70 milliGRAM(s)/deciliter      ALLERGIES:  Allergies    isoniazid (Rash)  nafcillin (Unknown)  hydrALAZINE (Rash)  vitamin E (Short breath; Urticaria; Hives)  doxycycline (Rash)  cefepime (Rash)  NIFEdipine (Urticaria; Hives)    Intolerances        LABS:                        7.3    12.99 )-----------( 202      ( 14 Aug 2023 01:10 )             23.3     08-14    132<L>  |  96<L>  |  56<H>  ----------------------------<  289<H>  4.3   |  22  |  3.79<H>    Ca    8.6      14 Aug 2023 01:10  Phos  4.2     08-14  Mg     2.70     08-14    TPro  5.3<L>  /  Alb  1.8<L>  /  TBili  <0.2  /  DBili  x   /  AST  11  /  ALT  9   /  AlkPhos  124<H>  08-14      Urinalysis Basic - ( 14 Aug 2023 01:10 )    Color: x / Appearance: x / SG: x / pH: x  Gluc: 289 mg/dL / Ketone: x  / Bili: x / Urobili: x   Blood: x / Protein: x / Nitrite: x   Leuk Esterase: x / RBC: x / WBC x   Sq Epi: x / Non Sq Epi: x / Bacteria: x        RADIOLOGY & ADDITIONAL TESTS: Reviewed. INTERVAL HPI/OVERNIGHT EVENTS:    SUBJECTIVE: Patient seen and examined at bedside. Patient intubated and off sedation, however, not following commands.       OBJECTIVE:    VITAL SIGNS:  ICU Vital Signs Last 24 Hrs  T(C): 37 (14 Aug 2023 04:00), Max: 38.2 (13 Aug 2023 20:00)  T(F): 98.6 (14 Aug 2023 04:00), Max: 100.7 (13 Aug 2023 20:00)  HR: 92 (14 Aug 2023 06:30) (76 - 109)  BP: --  BP(mean): --  ABP: 108/29 (14 Aug 2023 06:30) (91/29 - 180/52)  ABP(mean): 57 (14 Aug 2023 06:30) (49 - 96)  RR: 14 (14 Aug 2023 06:30) (14 - 128)  SpO2: 100% (14 Aug 2023 06:30) (99% - 100%)    O2 Parameters below as of 14 Aug 2023 03:24  Patient On (Oxygen Delivery Method): ventilator          Mode: AC/ CMV (Assist Control/ Continuous Mandatory Ventilation), RR (machine): 14, TV (machine): 360, FiO2: 30, PEEP: 5, ITime: 0.77, MAP: 10, PIP: 35    08-13 @ 07:01  -  08-14 @ 07:00  --------------------------------------------------------  IN: 2229 mL / OUT: 360 mL / NET: 1869 mL      CAPILLARY BLOOD GLUCOSE      POCT Blood Glucose.: 189 mg/dL (14 Aug 2023 05:48)      PHYSICAL EXAM:    General: NAD, intubated, not responsive to pain or commands  HEENT: NC/AT; PERRL, clear conjunctiva  Respiratory: CTA b/l  Cardiovascular: +S1/S2; RRR  Abdomen: soft, NT/ND; +BS x4  Extremities: WWP, 2+ peripheral pulses b/l; improved edema  Skin: erythematous eruption diffusely       MEDICATIONS:  MEDICATIONS  (STANDING):  albumin human 25% IVPB 50 milliLiter(s) IV Intermittent every 6 hours  albuterol/ipratropium for Nebulization 3 milliLiter(s) Nebulizer every 6 hours  aspirin  chewable 81 milliGRAM(s) Oral daily  atorvastatin 40 milliGRAM(s) Oral at bedtime  buMETAnide Infusion 2 mG/Hr (10 mL/Hr) IV Continuous <Continuous>  ceFAZolin   IVPB 1000 milliGRAM(s) IV Intermittent every 12 hours  chlorhexidine 0.12% Liquid 15 milliLiter(s) Oral Mucosa every 12 hours  chlorhexidine 2% Cloths 1 Application(s) Topical daily  dextrose 5%. 1000 milliLiter(s) (100 mL/Hr) IV Continuous <Continuous>  dextrose 5%. 1000 milliLiter(s) (50 mL/Hr) IV Continuous <Continuous>  dextrose 50% Injectable 25 Gram(s) IV Push once  dextrose 50% Injectable 25 Gram(s) IV Push once  dextrose 50% Injectable 12.5 Gram(s) IV Push once  glucagon  Injectable 1 milliGRAM(s) IntraMuscular once  heparin   Injectable 5000 Unit(s) SubCutaneous every 8 hours  insulin lispro (ADMELOG) corrective regimen sliding scale   SubCutaneous every 6 hours  insulin NPH human recombinant 5 Unit(s) SubCutaneous every 6 hours  midodrine 10 milliGRAM(s) Oral every 8 hours  norepinephrine Infusion 0.08 MICROgram(s)/kG/Min (4.99 mL/Hr) IV Continuous <Continuous>  petrolatum Ophthalmic Ointment 1 Application(s) Both EYES every 12 hours  sodium chloride 3%  Inhalation 4 milliLiter(s) Inhalation every 6 hours    MEDICATIONS  (PRN):  acetaminophen   Oral Liquid .. 650 milliGRAM(s) Oral every 6 hours PRN Temp greater or equal to 38C (100.4F), Mild Pain (1 - 3)  dextrose Oral Gel 15 Gram(s) Oral once PRN Blood Glucose LESS THAN 70 milliGRAM(s)/deciliter      ALLERGIES:  Allergies    isoniazid (Rash)  nafcillin (Unknown)  hydrALAZINE (Rash)  vitamin E (Short breath; Urticaria; Hives)  doxycycline (Rash)  cefepime (Rash)  NIFEdipine (Urticaria; Hives)    Intolerances        LABS:                        7.3    12.99 )-----------( 202      ( 14 Aug 2023 01:10 )             23.3     08-14    132<L>  |  96<L>  |  56<H>  ----------------------------<  289<H>  4.3   |  22  |  3.79<H>    Ca    8.6      14 Aug 2023 01:10  Phos  4.2     08-14  Mg     2.70     08-14    TPro  5.3<L>  /  Alb  1.8<L>  /  TBili  <0.2  /  DBili  x   /  AST  11  /  ALT  9   /  AlkPhos  124<H>  08-14      Urinalysis Basic - ( 14 Aug 2023 01:10 )    Color: x / Appearance: x / SG: x / pH: x  Gluc: 289 mg/dL / Ketone: x  / Bili: x / Urobili: x   Blood: x / Protein: x / Nitrite: x   Leuk Esterase: x / RBC: x / WBC x   Sq Epi: x / Non Sq Epi: x / Bacteria: x        RADIOLOGY & ADDITIONAL TESTS: Reviewed.

## 2023-08-14 NOTE — CONSULT NOTE ADULT - ASSESSMENT
74 y/o F DM on insulin, HTN, CKD, CHFpEF, breast CA, respiratory arrest and cardiac arrest (2018), CVA with residual weakness, aspiration PNA h/o trache, PEG, both removed presents with respiratory distress requiring intubation. Found to have elevated BNP, may be 2/2 to volume overload i/s/o CKD vs CHF.     Neuro ;  -Sedated : Baseline MS AOx3   -WAS ON PROPOFOL AND FENTANYL BEFORE: NOW OFF:    Monitor her mental status:  requiring only 30% fio2:    CVA   - cont aspirin and statin   Cardiac   -CHFpEF : TTE 7/2023 with EF 59%, with severe LVH and diastolic dysfunction : pro-BNP > 69472, trop 78   -on bumex drip :  -cards following  Pulmonary   -Acute hypercapnic and hypoxemic respiratory failure   -DDx: aspiration vs volume overload  VBG with respiratory acidosis pCO2 111  - CXR with small right pleural effusion, no consolidations/opacities  -now she seems better:  fio2: 30$:  -? etiology of hypercarbia:  multifactorial : seems to have OHS and TYRONE superimposed by acute chf  ? and aspiration pneumonia:"   -pt is mostly bed bound:   -it is possible that this time:  she mghtneed bipap on dc : atleast at the night time:   /Renal   -Hyperkalemia with EKG changes  : 7.2, hemolyzed, given insulin and D50 + calcium gluc   -K is better today : cont to monitor   GI  NPO for now  Endocrine  T2DM : A1C 7.1 in 7/2023   -cont to monitor blood glucose  ID   - No fever/leukocytosis  - Hx latent TB which was treated, no concern for TB  : She was recently ruledout for tuberculosis at [facility]   - Started on empiric vancomycin and aztreonam (cefepime/zosyn allergy? developed rash req prednisone)  : now dced:   defer to MICU   - f/u MRSA   - follow up blood culture/urine     dw  and PMD in MICU

## 2023-08-14 NOTE — CHART NOTE - NSCHARTNOTEFT_GEN_A_CORE
: Elfego/Jatin    INDICATION: Respiratory failure     PROCEDURE:  [x ] LIMITED ECHO  [x ] LIMITED CHEST  [ x] LIMITED RETROPERITONEAL  [ ] LIMITED ABDOMINAL  [ ] LIMITED DVT  [ ] NEEDLE GUIDANCE VASCULAR  [ ] NEEDLE GUIDANCE THORACENTESIS  [ ] NEEDLE GUIDANCE PARACENTESIS  [ ] NEEDLE GUIDANCE PERICARDIOCENTESIS  [ ] OTHER    FINDINGS:    Lung: Moderate R PLEFF. Few scattered B-lines    Cardiac: Normal GDE    RP: Hyperechoic R renal cortex without hydronephrosis bilaterally. Reduced pelvocaliceal differentiation       INTERPRETATION: No cardiac limitation. R PLEFF. No major interstitial abnormality. Findings c/w chronic renal injury       Images uploaded on [employer] : Elfego/Jatin    INDICATION: Respiratory failure     PROCEDURE:  [x ] LIMITED ECHO  [x ] LIMITED CHEST  [ x] LIMITED RETROPERITONEAL  [ ] LIMITED ABDOMINAL  [ ] LIMITED DVT  [ ] NEEDLE GUIDANCE VASCULAR  [ ] NEEDLE GUIDANCE THORACENTESIS  [ ] NEEDLE GUIDANCE PARACENTESIS  [ ] NEEDLE GUIDANCE PERICARDIOCENTESIS  [ ] OTHER    FINDINGS:    Lung: Moderate R PLEFF. Few scattered B-lines    Cardiac: Normal GDE    RP: Hyperechoic R renal cortex without hydronephrosis bilaterally. Reduced pelvocaliceal differentiation       INTERPRETATION: No cardiac limitation. R PLEFF. No major interstitial abnormality. Findings c/w chronic renal injury     I was present during the key portions of the procedure and immediately available during the entire procedure.  Jatin TITUS  Attending    Images uploaded on [employer]

## 2023-08-14 NOTE — PROGRESS NOTE ADULT - SUBJECTIVE AND OBJECTIVE BOX
INTERVAL HISTORY: Patient seen at bedside by stroke team & attending. Sedated on propofol. Per family, patient had eye opening on 8/12.  at bedside. Discussed EEG results.     PAST MEDICAL & SURGICAL HISTORY:  Breast CA      Diabetes      Stroke      Cardiac arrest      HTN (hypertension)      H/O mastectomy, bilateral        FAMILY HISTORY:  No pertinent family history in first degree relatives      SOCIAL HISTORY:   T/E/D:   Occupation:   Lives with:     MEDICATIONS (HOME):  Home Medications:  aspirin 81 mg oral delayed release tablet: 1 tab(s) orally once a day (12 Jul 2023 20:09)  atorvastatin 10 mg oral tablet: 1 tab(s) orally once a day (12 Jul 2023 20:13)  budesonide 0.5 mg/2 mL inhalation suspension: 2 milliliter(s) by nebulizer once a day (12 Jul 2023 20:13)  carvedilol 12.5 mg oral tablet: 3 tab(s) orally 2 times a day (12 Jul 2023 20:13)  cholecalciferol 50 mcg (2000 intl units) oral tablet: 1 tab(s) orally once a day (12 Jul 2023 20:13)  donepezil 10 mg oral tablet: 1 tab(s) orally once a day (at bedtime) (12 Jul 2023 20:13)  ipratropium 42 mcg/inh (0.06%) nasal spray: 1 spray(s) intranasally 2 times a day (12 Jul 2023 20:13)  Januvia 25 mg oral tablet: 1 tab(s) orally once a day (12 Jul 2023 20:13)  letrozole 2.5 mg oral tablet: 1 tab(s) orally once a day (12 Jul 2023 20:13)  PreserVision AREDS 2 oral capsule: 1 cap(s) orally once a day (12 Jul 2023 20:09)  sodium chloride 0.9% inhalation solution: 3 milliliter(s) inhaled every 6 hours as needed for  shortness of breath and/or wheezing (22 Jul 2023 12:09)    MEDICATIONS  (STANDING):  albumin human 25% IVPB 50 milliLiter(s) IV Intermittent every 6 hours  albuterol/ipratropium for Nebulization 3 milliLiter(s) Nebulizer every 6 hours  aspirin  chewable 81 milliGRAM(s) Oral daily  atorvastatin 40 milliGRAM(s) Oral at bedtime  buMETAnide Infusion 2 mG/Hr (10 mL/Hr) IV Continuous <Continuous>  ceFAZolin   IVPB 1000 milliGRAM(s) IV Intermittent every 12 hours  chlorhexidine 0.12% Liquid 15 milliLiter(s) Oral Mucosa every 12 hours  chlorhexidine 2% Cloths 1 Application(s) Topical daily  dextrose 5%. 1000 milliLiter(s) (50 mL/Hr) IV Continuous <Continuous>  dextrose 5%. 1000 milliLiter(s) (100 mL/Hr) IV Continuous <Continuous>  dextrose 50% Injectable 12.5 Gram(s) IV Push once  dextrose 50% Injectable 25 Gram(s) IV Push once  dextrose 50% Injectable 25 Gram(s) IV Push once  glucagon  Injectable 1 milliGRAM(s) IntraMuscular once  heparin   Injectable 5000 Unit(s) SubCutaneous every 8 hours  insulin lispro (ADMELOG) corrective regimen sliding scale   SubCutaneous every 6 hours  insulin NPH human recombinant 5 Unit(s) SubCutaneous every 6 hours  midodrine 10 milliGRAM(s) Oral every 8 hours  norepinephrine Infusion 0.08 MICROgram(s)/kG/Min (4.99 mL/Hr) IV Continuous <Continuous>  petrolatum Ophthalmic Ointment 1 Application(s) Both EYES every 12 hours  sodium chloride 3%  Inhalation 4 milliLiter(s) Inhalation every 6 hours    MEDICATIONS  (PRN):  acetaminophen   Oral Liquid .. 650 milliGRAM(s) Oral every 6 hours PRN Temp greater or equal to 38C (100.4F), Mild Pain (1 - 3)  dextrose Oral Gel 15 Gram(s) Oral once PRN Blood Glucose LESS THAN 70 milliGRAM(s)/deciliter    ALLERGIES/INTOLERANCES:  Allergies  isoniazid (Rash)  nafcillin (Unknown)  hydrALAZINE (Rash)  vitamin E (Short breath; Urticaria; Hives)  doxycycline (Rash)  cefepime (Rash)  NIFEdipine (Urticaria; Hives)    Intolerances    VITALS & EXAMINATION:  Vital Signs Last 24 Hrs  T(C): 37 (14 Aug 2023 04:00), Max: 38.2 (13 Aug 2023 20:00)  T(F): 98.6 (14 Aug 2023 04:00), Max: 100.7 (13 Aug 2023 20:00)  HR: 102 (14 Aug 2023 09:00) (76 - 111)  BP: --  BP(mean): --  RR: 14 (14 Aug 2023 08:00) (14 - 128)  SpO2: 100% (14 Aug 2023 09:00) (99% - 100%)    Parameters below as of 14 Aug 2023 09:00  Patient On (Oxygen Delivery Method): ventilator        General:  Constitutional: Female, appears stated age, in no apparent distress including pain  Head: Normocephalic & Atraumatic.  Respiratory: Breathing comfortably.    Neurological:  MS: Eyes tracely open. Not awake/alert, sedated. Not following commands.    Language: Intubated, no verbal output or effort to speak.   CNs: PERRL (R = 2mm, L = 2mm; minimally reactive). No blink to threat b/l. Primary gaze, no movement w/ Doll's head maneuver. No facial asymmetry b/l, full eye closure strength b/l. Corneal sluggishly intact b/l.    Motor: Normal muscle bulk & tone. No noticeable tremor. No movement of RUE. Mild withdrawal of LUE to noxious stimuli. No movement of b/l LE, or withdrawal.     Sensation: No grimace to noxious stimuli b/l throughout.     Reflexes:              Biceps(C5)       BR(C6)     Triceps(C7)               Patellar(L4)    Achilles(S1)      R	              1	          1	             1		  1		    1		        L	              1	          1	             1		  1		    1		          Coordination: Unable to assess    Gait: Deferred.       LABORATORY:  CBC                       7.3    12.99 )-----------( 202      ( 14 Aug 2023 01:10 )             23.3     Chem 08-14    132<L>  |  96<L>  |  56<H>  ----------------------------<  289<H>  4.3   |  22  |  3.79<H>    Ca    8.6      14 Aug 2023 01:10  Phos  4.2     08-14  Mg     2.70     08-14    TPro  5.3<L>  /  Alb  1.8<L>  /  TBili  <0.2  /  DBili  x   /  AST  11  /  ALT  9   /  AlkPhos  124<H>  08-14    LFTs LIVER FUNCTIONS - ( 14 Aug 2023 01:10 )  Alb: 1.8 g/dL / Pro: 5.3 g/dL / ALK PHOS: 124 U/L / ALT: 9 U/L / AST: 11 U/L / GGT: x           Coagulopathy   Lipid Panel   A1c   Cardiac enzymes     U/A Urinalysis Basic - ( 14 Aug 2023 01:10 )    Color: x / Appearance: x / SG: x / pH: x  Gluc: 289 mg/dL / Ketone: x  / Bili: x / Urobili: x   Blood: x / Protein: x / Nitrite: x   Leuk Esterase: x / RBC: x / WBC x   Sq Epi: x / Non Sq Epi: x / Bacteria: x      CSF  Immunological  Other    STUDIES & IMAGING:  Studies (EKG, EEG, EMG, etc):     Radiology (XR, CT, MR, U/S, TTE/STEPHANIE):    vEEG:    Clinical Impression:  - Severe diffuse non-specific cerebral dysfunction  - There were no epileptiform abnormalities or seizures recorded.        CTH 8/11:    VENTRICLES AND SULCI: Age-appropriate involutional change  INTRA-AXIAL:  Old left PCA infarct as seen on the prior unchanged.   Microvascular ischemic changes involving the periventricular and   subcortical white matter as seen previously  EXTRA-AXIAL:  No mass or collection is seen.  VISUALIZED SINUSES:  Clear.  VISUALIZED MASTOIDS: Left mastoid sclerosis  CALVARIUM: Infiltrative appearance tothe calvarium may be indicative of   marrow infiltration on the basis of patient's known diagnosis of breast   cancer. MISCELLANEOUS:  None.    IMPRESSION:  No significant interval change compared with 7/17/2023 in   left PCA infarct which is old. Microvascular ischemic changes involving   the periventricular and subcortical white matter as seen   previously.Questionable lesions at the level of the calvarium related to   possible breast CA. Clinical correlation recommended.    --- End of Report ---

## 2023-08-14 NOTE — PROGRESS NOTE ADULT - ATTENDING COMMENTS
Patient examined and case reviewed in detail on bedside rounds  Critically ill and unstable on vent/pressors with PNA   Frequent bedside visits with therapy change today.   I have personally provided 35+ minutes of critical care time concurrently with the resident/fellow; this excludes time spent on separate procedures.

## 2023-08-14 NOTE — PROGRESS NOTE ADULT - ASSESSMENT
74 y/o F DM on insulin, HTN, CKD,breast CA, respiratory arrest and cardiac arrest (2018), CVA with residual weakness, aspiration PNA h/o trache, PEG, both removed presents with respiratory distress requiring intubation. Had recent admission d/c 7/22/23 for cough and respiratory distress (no intubation) thought to be i/s/o aspiration PNA s/p cefepime course; developed rash in response. Infectious w/u was negative at this time. Was discharged home, daughter and  at bedside providing history. Patient admitted to MICU for higher level of care, intubated & sedated on fentanyl & propofol. While in the MICU, patient was observed to have RUE shaking. Ativan given & EEG started, pending final report. Neurology consulted for further recommendations. Patient unable to offer history at this time. At bedside, daughter &  offer collateral. As per daughter, patient follows with Dr. Garner for stroke management since 2018 and Dr. Poon for tremors. Patient's tremors usually consist of RUE or head shaking, can be stopped by the patient with effort; this has been ongoing for 2-3 years. However, as of the past 2-3 weeks, patient has been experiencing more frequent, more severe RUE shaking intermittently; during these episodes patient is awake and alert. Does not take medications for tremors. No hx of vocal tremors. No hx of seizure, incontinence, tongue bite. For memory loss, patient has been taking Donepezil. Family inquiring about taking alternative supplements instead of Donepezil. At baseline patient wheelchair/bedbound and requires assistance with ADLs. CTH obtained 8/11, no interval change noted. EEG w/o evidence of seizures. As per  at bedside, patient opened eyes more and he saw some spontaneous movement of the RUE.     Impression:   1) Worsening RUE shaking of unclear etiology at this time; no evidence of seizures. Possibly worsening of baseline tremor in the setting of underlying toxic metabolic and/or infectious etiologies.   2) Decreased level of consciousness and limited neurological exam due to diffuse cerebral dysfunction in the setting of sedating agents; wean as tolerated  3) L PCA stroke, ESUS s/p ILR, also with bilateral centrum semiovale watershed infarcts   4) Dementia   5) AMS likely toxic metabolic encephalopathy; resp distress requirining intubation    Recommendations     [] Can discontinue EEG  [] STAT CTH for decline in neuro exam   [] Wean propofol, fentanyl as tolerated   [] C/w home Donepezil  [] C/w home ASA 81 mg if no contraindications   [] C/w home Atorvastatin 10 mg qhs   [] DVT/GI prophlaxis  [] Neurochecks q4hr   [] BP goals: Normotension   [] PT/OT when able   [] Diet: NPO w/ tube feeds    [] HgA1C goals < 7.0   [] Upon discharge, patient should follow up with Dr. Bianchi:  [phone] [address] [place] [address]     Please call with questions: [phone] 74 y/o F DM on insulin, HTN, CKD,breast CA, respiratory arrest and cardiac arrest (2018), CVA with residual weakness, aspiration PNA h/o trache, PEG, both removed presents with respiratory distress requiring intubation. Had recent admission d/c 7/22/23 for cough and respiratory distress (no intubation) thought to be i/s/o aspiration PNA s/p cefepime course; developed rash in response. Infectious w/u was negative at this time. Was discharged home, daughter and  at bedside providing history. Patient admitted to MICU for higher level of care, intubated & sedated on fentanyl & propofol. While in the MICU, patient was observed to have RUE shaking. Ativan given & EEG started, pending final report. Neurology consulted for further recommendations. Patient unable to offer history at this time. At bedside, daughter &  offer collateral. As per daughter, patient follows with Dr. Garner for stroke management since 2018 and Dr. Poon for tremors. Patient's tremors usually consist of RUE or head shaking, can be stopped by the patient with effort; this has been ongoing for 2-3 years. However, as of the past 2-3 weeks, patient has been experiencing more frequent, more severe RUE shaking intermittently; during these episodes patient is awake and alert. Does not take medications for tremors. No hx of vocal tremors. No hx of seizure, incontinence, tongue bite. For memory loss, patient has been taking Donepezil. Family inquiring about taking alternative supplements instead of Donepezil. At baseline patient wheelchair/bedbound and requires assistance with ADLs. CTH obtained 8/11, no interval change noted. EEG w/o evidence of seizures. As per  at bedside, patient opened eyes more and he saw some spontaneous movement of the RUE.     Impression:   1) Worsening RUE shaking of unclear etiology at this time; no evidence of seizures. Possibly worsening of baseline tremor in the setting of underlying toxic metabolic and/or infectious etiologies.   2) Decreased level of consciousness and limited neurological exam due to diffuse cerebral dysfunction in the setting of sedating agents; wean as tolerated  3) L PCA stroke, ESUS s/p ILR, also with bilateral centrum semiovale watershed infarcts   4) Dementia   5) AMS likely toxic metabolic encephalopathy; resp distress requiring intubation    Recommendations     [] Can discontinue EEG  [] STAT CTH for decline in neuro exam   [] Wean propofol, fentanyl as tolerated   [] C/w home Donepezil  [] C/w home ASA 81 mg if no contraindications   [] C/w home Atorvastatin 10 mg qhs   [] DVT/GI prophlaxis  [] Neurochecks q4hr   [] BP goals: Normotension   [] PT/OT when able   [] Diet: NPO w/ tube feeds    [] HgA1C goals < 7.0   [] Upon discharge, patient should follow up with Dr. Bianchi:  [phone] [address] [place] [address]     Please call with questions: [phone] 72 y/o F DM on insulin, HTN, CKD,breast CA, respiratory arrest and cardiac arrest (2018), CVA with residual weakness, aspiration PNA h/o trache, PEG, both removed presents with respiratory distress requiring intubation. Had recent admission d/c 7/22/23 for cough and respiratory distress (no intubation) thought to be i/s/o aspiration PNA s/p cefepime course; developed rash in response. Infectious w/u was negative at this time. Was discharged home, daughter and  at bedside providing history. Patient admitted to MICU for higher level of care, intubated & sedated on fentanyl & propofol. While in the MICU, patient was observed to have RUE shaking. Ativan given & EEG started, pending final report. Neurology consulted for further recommendations. Patient unable to offer history at this time. At bedside, daughter &  offer collateral. As per daughter, patient follows with Dr. Garner for stroke management since 2018 and Dr. Poon for tremors. Patient's tremors usually consist of RUE or head shaking, can be stopped by the patient with effort; this has been ongoing for 2-3 years. However, as of the past 2-3 weeks, patient has been experiencing more frequent, more severe RUE shaking intermittently; during these episodes patient is awake and alert. Does not take medications for tremors. No hx of vocal tremors. No hx of seizure, incontinence, tongue bite. For memory loss, patient has been taking Donepezil. Family inquiring about taking alternative supplements instead of Donepezil. At baseline patient wheelchair/bedbound and requires assistance with ADLs. CTH obtained 8/11, no interval change noted. EEG w/o evidence of seizures. As per  at bedside, patient opened eyes more and he saw some spontaneous movement of the RUE.     Impression:   1) Worsening RUE shaking of unclear etiology at this time; no evidence of seizures. Possibly worsening of baseline tremor in the setting of underlying toxic metabolic and/or infectious etiologies.   2) Decreased level of consciousness and limited neurological exam due to diffuse cerebral dysfunction in the setting of sedating agents; wean as tolerated  3) L PCA stroke, ESUS s/p ILR, also with bilateral centrum semiovale watershed infarcts   4) Dementia   5) AMS likely toxic metabolic encephalopathy; resp distress requiring intubation    Recommendations     [] Can discontinue EEG  [] STAT CTH if decline in neuro exam   [] Wean propofol, fentanyl as tolerated   [] C/w home Donepezil  [] C/w home ASA 81 mg if no contraindications   [] C/w home Atorvastatin 10 mg qhs   [] DVT/GI prophlaxis  [] Neurochecks q4hr   [] BP goals: Normotension   [] PT/OT when able   [] Diet: NPO w/ tube feeds    [] HgA1C goals < 7.0   [] Upon discharge, patient should follow up with Dr. Bianchi:  [phone] [address] [place] [address]     Please call with questions: [phone]

## 2023-08-14 NOTE — PROGRESS NOTE ADULT - SUBJECTIVE AND OBJECTIVE BOX
Patient is a 73y old  Female who presents with a chief complaint of Respiratory distress (14 Aug 2023 12:55)      SUBJECTIVE / OVERNIGHT EVENTS: remains intubated in MICU, off sedation, on Midodrine, on Levo, bumex drip,     MEDICATIONS  (STANDING):  albumin human 25% IVPB 50 milliLiter(s) IV Intermittent every 6 hours  albuterol/ipratropium for Nebulization 3 milliLiter(s) Nebulizer every 6 hours  aspirin  chewable 81 milliGRAM(s) Oral daily  atorvastatin 40 milliGRAM(s) Oral at bedtime  buMETAnide Infusion 2 mG/Hr (10 mL/Hr) IV Continuous <Continuous>  chlorhexidine 0.12% Liquid 15 milliLiter(s) Oral Mucosa every 12 hours  chlorhexidine 2% Cloths 1 Application(s) Topical daily  dextrose 5%. 1000 milliLiter(s) (100 mL/Hr) IV Continuous <Continuous>  dextrose 5%. 1000 milliLiter(s) (50 mL/Hr) IV Continuous <Continuous>  dextrose 50% Injectable 25 Gram(s) IV Push once  dextrose 50% Injectable 25 Gram(s) IV Push once  dextrose 50% Injectable 12.5 Gram(s) IV Push once  glucagon  Injectable 1 milliGRAM(s) IntraMuscular once  heparin   Injectable 5000 Unit(s) SubCutaneous every 8 hours  insulin lispro (ADMELOG) corrective regimen sliding scale   SubCutaneous every 6 hours  insulin NPH human recombinant 5 Unit(s) SubCutaneous every 6 hours  midodrine 10 milliGRAM(s) Oral every 8 hours  norepinephrine Infusion 0.08 MICROgram(s)/kG/Min (4.99 mL/Hr) IV Continuous <Continuous>  sodium chloride 3%  Inhalation 4 milliLiter(s) Inhalation every 6 hours    MEDICATIONS  (PRN):  acetaminophen   Oral Liquid .. 650 milliGRAM(s) Oral every 6 hours PRN Temp greater or equal to 38C (100.4F), Mild Pain (1 - 3)  dextrose Oral Gel 15 Gram(s) Oral once PRN Blood Glucose LESS THAN 70 milliGRAM(s)/deciliter      Vital Signs Last 24 Hrs  T(F): 100 (08-14-23 @ 20:00), Max: 100 (08-14-23 @ 20:00)  HR: 88 (08-14-23 @ 23:00) (81 - 111)  BP: --  RR: 13 (08-14-23 @ 23:00) (13 - 19)  SpO2: 100% (08-14-23 @ 23:00) (98% - 100%)  Telemetry:   CAPILLARY BLOOD GLUCOSE      POCT Blood Glucose.: 130 mg/dL (14 Aug 2023 17:43)  POCT Blood Glucose.: 111 mg/dL (14 Aug 2023 12:11)  POCT Blood Glucose.: 189 mg/dL (14 Aug 2023 05:48)  POCT Blood Glucose.: 308 mg/dL (13 Aug 2023 23:34)    I&O's Summary    13 Aug 2023 07:01  -  14 Aug 2023 07:00  --------------------------------------------------------  IN: 2229 mL / OUT: 360 mL / NET: 1869 mL    14 Aug 2023 07:01  -  14 Aug 2023 23:16  --------------------------------------------------------  IN: 544.7 mL / OUT: 272 mL / NET: 272.7 mL        PHYSICAL EXAM:  GENERAL: NAD, well-developed  HEAD:  Atraumatic, Normocephalic  EYES: EOMI, PERRLA, conjunctiva and sclera clear  NECK: Supple, No JVD  CHEST/LUNG: Clear to auscultation bilaterally; No wheeze  HEART: Regular rate and rhythm; No murmurs, rubs, or gallops  ABDOMEN: Soft, Nontender, Nondistended; Bowel sounds present  EXTREMITIES:  2+ Peripheral Pulses, No clubbing, cyanosis, or edema  PSYCH: AAOx3  NEUROLOGY: non-focal  SKIN: No rashes or lesions    LABS:                        7.3    12.99 )-----------( 202      ( 14 Aug 2023 01:10 )             23.3     08-14    133<L>  |  96<L>  |  60<H>  ----------------------------<  174<H>  4.2   |  23  |  4.07<H>    Ca    8.5      14 Aug 2023 20:50  Phos  4.4     08-14  Mg     2.80     08-14    TPro  5.7<L>  /  Alb  2.5<L>  /  TBili  <0.2  /  DBili  x   /  AST  18  /  ALT  9   /  AlkPhos  117  08-14          Urinalysis Basic - ( 14 Aug 2023 20:50 )    Color: x / Appearance: x / SG: x / pH: x  Gluc: 174 mg/dL / Ketone: x  / Bili: x / Urobili: x   Blood: x / Protein: x / Nitrite: x   Leuk Esterase: x / RBC: x / WBC x   Sq Epi: x / Non Sq Epi: x / Bacteria: x        RADIOLOGY & ADDITIONAL TESTS:    Imaging Personally Reviewed:    Consultant(s) Notes Reviewed:      Care Discussed with Consultants/Other Providers:

## 2023-08-14 NOTE — PROGRESS NOTE ADULT - SUBJECTIVE AND OBJECTIVE BOX
Remains on vent  Levophed gtt being weaned down      VITAL:  T(C): , Max: 38.2 (08-13-23 @ 20:00)  T(F): , Max: 100.7 (08-13-23 @ 20:00)  HR: 88 (08-14-23 @ 11:02)  BP: 115/30  RR: 15 (08-14-23 @ 11:00)  SpO2: 100% (08-14-23 @ 11:02)  urine output 265cc/12h      PHYSICAL EXAM:  Constitutional: intubated/sedated  HEENT: (+)ETT/OGT  Neck: Supple, No JVD  Respiratory: coarse BS b/l  Cardiovascular: RRR s1s2, no m/r/g  Gastrointestinal: BS+, soft, NT/ND  GI: (+)willard  Extremities: No peripheral edema b/l  Back: no CVAT b/l  Skin: No rashes, no nevi    LABS:                        7.3    12.99 )-----------( 202      ( 14 Aug 2023 01:10 )             23.3     Na(132)/K(4.3)/Cl(96)/HCO3(22)/BUN(56)/Cr(3.79)Glu(289)/Ca(8.6)/Mg(2.70)/PO4(4.2)    08-14 @ 01:10  Na(132)/K(4.5)/Cl(98)/HCO3(23)/BUN(56)/Cr(3.64)Glu(256)/Ca(8.2)/Mg(--)/PO4(--)    08-13 @ 19:14  Na(134)/K(4.7)/Cl(99)/HCO3(24)/BUN(56)/Cr(3.72)Glu(257)/Ca(8.3)/Mg(--)/PO4(--)    08-13 @ 11:10  Na(135)/K(5.0)/Cl(98)/HCO3(25)/BUN(53)/Cr(3.50)Glu(271)/Ca(8.4)/Mg(2.80)/PO4(4.9)    08-13 @ 01:44  Na(137)/K(5.1)/Cl(100)/HCO3(26)/BUN(48)/Cr(2.97)Glu(227)/Ca(8.6)/Mg(2.80)/PO4(3.2)    08-12 @ 00:30  Na(141)/K(4.4)/Cl(108)/HCO3(21)/BUN(37)/Cr(1.76)Glu(261)/Ca(7.0)/Mg(1.60)/PO4(3.5)    08-11 @ 19:50    ABG 7.33/45/140/24  Vanco 22.4    IMPRESSION: 73F w/ HTN, DM2, CVA, breast CA-bilateral mastectomy, recurrent aspiration pneumonia/respiratory failure, and CKD, 8/11/23 p/w acute hypercapnic respiratory failure; c/b RUSS    (1)Renal - CKD4     (2)RUSS - most likely ATN; must also consider possibility of AIN, given the rash from recent rash/eosinophilia. Indicated for UA to look for pyuria; there was no pyuria on UA on 8/11/23, prior to onset of the RUSS. Creatinine has plateaued in high 3s, corresponding with GFR~10-15ml/min. No need for dialysis for now    (3)Hyperkalemia - improved relative to 1-2 days ago    (4)Pulm- hypercapnic respiratory failure - intubated    (5)ID - PNA - on IV Abx    (6)CV - shock - pressor requirements decreasing      RECOMMEND:  (1)Can give IV diuretics to promote urine output/convert to nonoliguric status  (2)Check UA  (3)Dose new meds for GFR 10-15ml/min  (4)BMP+MG+PO4 daily  (5)No HD for now                Jimmy Colon MD  [facility]  Office/on call physician: [phone]  Cell (7a-7p): [phone]       Remains on vent  Levophed gtt being weaned down      VITAL:  T(C): , Max: 38.2 (08-13-23 @ 20:00)  T(F): , Max: 100.7 (08-13-23 @ 20:00)  HR: 88 (08-14-23 @ 11:02)  BP: 115/30  RR: 15 (08-14-23 @ 11:00)  SpO2: 100% (08-14-23 @ 11:02)  urine output 265cc/12h      PHYSICAL EXAM:  Constitutional: intubated/sedated  HEENT: (+)ETT/OGT  Neck: Supple, No JVD  Respiratory: coarse BS b/l  Cardiovascular: RRR s1s2, no m/r/g  Gastrointestinal: BS+, soft, NT/ND  GI: (+)willard  Extremities: No peripheral edema b/l  Back: no CVAT b/l  Skin: No rashes, no nevi    LABS:                        7.3    12.99 )-----------( 202      ( 14 Aug 2023 01:10 )             23.3     Na(132)/K(4.3)/Cl(96)/HCO3(22)/BUN(56)/Cr(3.79)Glu(289)/Ca(8.6)/Mg(2.70)/PO4(4.2)    08-14 @ 01:10  Na(132)/K(4.5)/Cl(98)/HCO3(23)/BUN(56)/Cr(3.64)Glu(256)/Ca(8.2)/Mg(--)/PO4(--)    08-13 @ 19:14  Na(134)/K(4.7)/Cl(99)/HCO3(24)/BUN(56)/Cr(3.72)Glu(257)/Ca(8.3)/Mg(--)/PO4(--)    08-13 @ 11:10  Na(135)/K(5.0)/Cl(98)/HCO3(25)/BUN(53)/Cr(3.50)Glu(271)/Ca(8.4)/Mg(2.80)/PO4(4.9)    08-13 @ 01:44  Na(137)/K(5.1)/Cl(100)/HCO3(26)/BUN(48)/Cr(2.97)Glu(227)/Ca(8.6)/Mg(2.80)/PO4(3.2)    08-12 @ 00:30  Na(141)/K(4.4)/Cl(108)/HCO3(21)/BUN(37)/Cr(1.76)Glu(261)/Ca(7.0)/Mg(1.60)/PO4(3.5)    08-11 @ 19:50    ABG 7.33/45/140/24  Vanco 22.4    IMPRESSION: 73F w/ HTN, DM2, CVA, breast CA-bilateral mastectomy, recurrent aspiration pneumonia/respiratory failure, and CKD, 8/11/23 p/w acute hypercapnic respiratory failure; c/b RUSS    (1)Renal - CKD4     (2)RUSS - most likely ATN; must also consider possibility of AIN, given the rash from recent rash/eosinophilia. Indicated for UA to look for pyuria; there was no pyuria on UA on 8/11/23, prior to onset of the RUSS. Creatinine has plateaued in high 3s, corresponding with GFR~10-15ml/min. No need for dialysis for now    (3)Hyperkalemia - improved relative to 1-2 days ago    (4)Pulm- hypercapnic respiratory failure - intubated    (5)ID - PNA - on IV Abx    (6)CV - shock - pressor requirements decreasing      RECOMMEND:  (1)Can give IV diuretics to promote urine output/convert to nonoliguric status  (2)Check UA  (3)Dose new meds for GFR 10-15ml/min  (4)BMP+MG+PO4 daily  (5)No HD for now          Jimmy Colon MD  [facility]  Office/on call physician: [phone]  Cell (7a-7p): [phone]

## 2023-08-14 NOTE — CONSULT NOTE ADULT - SUBJECTIVE AND OBJECTIVE BOX
08-14-23 @ 11:45    Patient is a 73y old  Female who presents with a chief complaint of Respiratory distress (14 Aug 2023 09:36)      HPI:  72 y/o F DM on insulin, HTN, CKD,breast CA, respiratory arrest and cardiac arrest (2018), CVA with residual weakness, aspiration PNA h/o trache, PEG, both removed presents with respiratory distress requiring intubation. Had recent admission d/c 7/22/23 for cough and respiratory distress (no intubation) thought to be i/s/o aspiration PNA s/p cefepime course; developed rash in response. Infectious w/u was negative at this time. Was discharged home, daughter and  at bedside providing history.     Reports that she was initially improving with O2 sats in the high 90s on room air, however, then became more lethargic and not herself (baseline mental status AOx3). Also had worsening shortness of breath while laying down and leg swelling. Contacted cardiologist who started her on bumex 1mg daily. Developed low sugars overnight before admission and was given juice with some food, daughter reports no coughing during episode. At around 4-5 AM developed vomiting and coughing, O2 sats dropped to 80s and EMS was called. Denies fevers/chills, dysuria or urinary frequency, chest pain, palpitations. Uses wheelchair at home however family moves her around frequently.     In ED, was on BiPAP with increased WOB and was intubated. Found to have elevated pro-BNP and CO2 of 111 on VBG. CXR with small right pleural effusion, started on vanc in the ED.  (11 Aug 2023 09:08)    she presented wit hypercapnic resp failure :  she was adm at [facility] in july when she was also found to be hypercapnic but had recovered and was alert and awake and accepting oral foods and then was discharged:   now she presented with hypercapnic resp failure :  she is intubated and  is a tbedside:   she was on sedative before: propofol was dced last night:  she took one dose of nyquil at home too secondary to inability to sleep        ?FOLLOWING PRESENT  [x ] Hx of PE/DVT, [ x] Hx COPD, [x] Hx of Asthma, [ y] Hx of Hospitalization, [x ]  Hx of BiPAP/CPAP use, [x ] Hx of TYRONE    Allergies    isoniazid (Rash)  nafcillin (Unknown)  hydrALAZINE (Rash)  vitamin E (Short breath; Urticaria; Hives)  doxycycline (Rash)  cefepime (Rash)  NIFEdipine (Urticaria; Hives)    Intolerances        PAST MEDICAL & SURGICAL HISTORY:  Breast CA      Diabetes      Stroke      Cardiac arrest      HTN (hypertension)      H/O mastectomy, bilateral          FAMILY HISTORY:  No pertinent family history in first degree relatives        Social History: [  ]x TOBACCO                  [x  ] ETOH                                 [x  ] IVDA/DRUGS    REVIEW OF SYSTEMS    intubated:  per  she desaturated   General:	    Skin/Breast:  	  Ophthalmologic:  	  ENMT:	    Respiratory and Thorax:  	  Cardiovascular:	    Gastrointestinal:	    Genitourinary:	    Musculoskeletal:	    Neurological:	    Psychiatric:	    Hematology/Lymphatics:	    Endocrine:	    Allergic/Immunologic:	    MEDICATIONS  (STANDING):  albumin human 25% IVPB 50 milliLiter(s) IV Intermittent every 6 hours  albuterol/ipratropium for Nebulization 3 milliLiter(s) Nebulizer every 6 hours  aspirin  chewable 81 milliGRAM(s) Oral daily  atorvastatin 40 milliGRAM(s) Oral at bedtime  chlorhexidine 0.12% Liquid 15 milliLiter(s) Oral Mucosa every 12 hours  chlorhexidine 2% Cloths 1 Application(s) Topical daily  dextrose 5%. 1000 milliLiter(s) (50 mL/Hr) IV Continuous <Continuous>  dextrose 5%. 1000 milliLiter(s) (100 mL/Hr) IV Continuous <Continuous>  dextrose 50% Injectable 12.5 Gram(s) IV Push once  dextrose 50% Injectable 25 Gram(s) IV Push once  dextrose 50% Injectable 25 Gram(s) IV Push once  glucagon  Injectable 1 milliGRAM(s) IntraMuscular once  heparin   Injectable 5000 Unit(s) SubCutaneous every 8 hours  insulin lispro (ADMELOG) corrective regimen sliding scale   SubCutaneous every 6 hours  insulin NPH human recombinant 5 Unit(s) SubCutaneous every 6 hours  midodrine 10 milliGRAM(s) Oral every 8 hours  norepinephrine Infusion 0.08 MICROgram(s)/kG/Min (4.99 mL/Hr) IV Continuous <Continuous>  petrolatum Ophthalmic Ointment 1 Application(s) Both EYES every 12 hours  sodium chloride 3%  Inhalation 4 milliLiter(s) Inhalation every 6 hours    MEDICATIONS  (PRN):  acetaminophen   Oral Liquid .. 650 milliGRAM(s) Oral every 6 hours PRN Temp greater or equal to 38C (100.4F), Mild Pain (1 - 3)  dextrose Oral Gel 15 Gram(s) Oral once PRN Blood Glucose LESS THAN 70 milliGRAM(s)/deciliter       Vital Signs Last 24 Hrs  T(C): 37.1 (14 Aug 2023 08:00), Max: 38.2 (13 Aug 2023 20:00)  T(F): 98.8 (14 Aug 2023 08:00), Max: 100.7 (13 Aug 2023 20:00)  HR: 88 (14 Aug 2023 11:02) (76 - 111)  BP: --  BP(mean): --  RR: 15 (14 Aug 2023 11:00) (14 - 128)  SpO2: 100% (14 Aug 2023 11:02) (99% - 100%)    Parameters below as of 14 Aug 2023 11:00  Patient On (Oxygen Delivery Method): ventilator    Orthostatic VS      Mode: AC/ CMV (Assist Control/ Continuous Mandatory Ventilation)  RR (machine): 14  TV (machine): 360  FiO2: 30  PEEP: 5  MAP: 11  PIP: 37      I&O's Summary    13 Aug 2023 07:01  -  14 Aug 2023 07:00  --------------------------------------------------------  IN: 2229 mL / OUT: 360 mL / NET: 1869 mL        Physical Exam:   GENERAL: NAD, well-groomed, well-developed  HEENT: MANDY/   Atraumatic, Normocephalic  ENMT: No tonsillar erythema, exudates, or enlargement; Moist mucous membranes, Good dentition, No lesions  NECK: Supple, No JVD, Normal thyroid  CHEST/LUNG: Clear to auscultation bilaterally  CVS: Regular rate and rhythm; No murmurs, rubs, or gallops  GI: : Soft, Nontender, Nondistended; Bowel sounds present  NERVOUS SYSTEM:  Aintubated and sedated:   EXTREMITIES:  + edema  LYMPH: No lymphadenopathy noted  SKIN: No rashes or lesions  ENDOCRINOLOGY: No Thyromegaly  PSYCH: intubated and sedated    Labs:  ABG - ( 14 Aug 2023 01:10 )  pH, Arterial: 7.33  pH, Blood: x     /  pCO2: 45    /  pO2: 140   / HCO3: 24    / Base Excess: -2.1  /  SaO2: 99.7            4.4<39<4>>46<<7.475>>4.4<<3><<4><<5<<469>>, 5.4<35<4>>53<<7.525>>5.4<<3><<4><<5<<539>>, 5.4<111<4>>48<<7.135>>5.4<<3><<4><<5<<489>>SARS-CoV-2: NotDetec (11 Aug 2023 08:15)  SARS-CoV-2: NotDetec (12 Jul 2023 16:17)                              7.3    12.99 )-----------( 202      ( 14 Aug 2023 01:10 )             23.3                         8.1    14.11 )-----------( 280      ( 13 Aug 2023 11:10 )             26.7                         7.7    8.56  )-----------( 240      ( 13 Aug 2023 01:44 )             25.2                         8.7    9.57  )-----------( 276      ( 12 Aug 2023 00:30 )             27.1                         9.1    4.15  )-----------( 197      ( 11 Aug 2023 07:29 )             31.0     08-14    132<L>  |  96<L>  |  56<H>  ----------------------------<  289<H>  4.3   |  22  |  3.79<H>  08-13    132<L>  |  98  |  56<H>  ----------------------------<  256<H>  4.5   |  23  |  3.64<H>  08-13    134<L>  |  99  |  56<H>  ----------------------------<  257<H>  4.7   |  24  |  3.72<H>  08-13    135  |  98  |  53<H>  ----------------------------<  271<H>  5.0   |  25  |  3.50<H>  08-12    137  |  100  |  48<H>  ----------------------------<  227<H>  5.1   |  26  |  2.97<H>  08-11    141  |  108<H>  |  37<H>  ----------------------------<  261<H>  4.4   |  21<L>  |  1.76<H>  08-11    135  |  98  |  44<H>  ----------------------------<  359<H>  4.9   |  28  |  2.56<H>  08-11    137  |  97<L>  |  44<H>  ----------------------------<  309<H>  6.0<H>   |  32<H>  |  2.66<H>  08-11    134<L>  |  99  |  44<H>  ----------------------------<  306<H>  7.2<HH>   |  27  |  2.61<H>    Ca    8.6      14 Aug 2023 01:10  Ca    8.2<L>      13 Aug 2023 19:14  Ca    8.3<L>      13 Aug 2023 11:10  Ca    8.4      13 Aug 2023 01:44  Phos  4.2     08-14  Phos  4.9     08-13  Mg     2.70     08-14  Mg     2.80     08-13    TPro  5.3<L>  /  Alb  1.8<L>  /  TBili  <0.2  /  DBili  x   /  AST  11  /  ALT  9   /  AlkPhos  124<H>  08-14  TPro  5.5<L>  /  Alb  1.8<L>  /  TBili  <0.2  /  DBili  x   /  AST  14  /  ALT  12  /  AlkPhos  115  08-13  TPro  6.4  /  Alb  2.2<L>  /  TBili  0.2  /  DBili  x   /  AST  17  /  ALT  16  /  AlkPhos  105  08-12  TPro  5.5<L>  /  Alb  1.9<L>  /  TBili  0.2  /  DBili  x   /  AST  18  /  ALT  14  /  AlkPhos  90  08-11  TPro  8.2  /  Alb  3.1<L>  /  TBili  0.4  /  DBili  x   /  AST  21  /  ALT  24  /  AlkPhos  136<H>  08-11  TPro  7.7  /  Alb  2.7<L>  /  TBili  0.3  /  DBili  x   /  AST  48<H>  /  ALT  28  /  AlkPhos  124<H>  08-11    CAPILLARY BLOOD GLUCOSE      POCT Blood Glucose.: 189 mg/dL (14 Aug 2023 05:48)  POCT Blood Glucose.: 308 mg/dL (13 Aug 2023 23:34)  POCT Blood Glucose.: 250 mg/dL (13 Aug 2023 17:34)  POCT Blood Glucose.: 291 mg/dL (13 Aug 2023 13:05)    LIVER FUNCTIONS - ( 14 Aug 2023 01:10 )  Alb: 1.8 g/dL / Pro: 5.3 g/dL / ALK PHOS: 124 U/L / ALT: 9 U/L / AST: 11 U/L / GGT: x             Urinalysis Basic - ( 14 Aug 2023 01:10 )    Color: x / Appearance: x / SG: x / pH: x  Gluc: 289 mg/dL / Ketone: x  / Bili: x / Urobili: x   Blood: x / Protein: x / Nitrite: x   Leuk Esterase: x / RBC: x / WBC x   Sq Epi: x / Non Sq Epi: x / Bacteria: x      Culture - Sputum (collected 11 Aug 2023 17:50)  Source: .Sputum Sputum  Gram Stain (12 Aug 2023 07:24):    Rare polymorphonuclear leukocytes per low power field    Rare Squamous epithelial cells per low power field    Numerous Gram positive cocci in pairs per oil power field  Final Report (13 Aug 2023 21:49):    Numerous Staphylococcus aureus  Organism: Staphylococcus aureus (13 Aug 2023 21:49)  Organism: Staphylococcus aureus (13 Aug 2023 21:49)    Culture - Urine (collected 11 Aug 2023 12:19)  Source: Clean Catch Clean Catch (Midstream)  Final Report (12 Aug 2023 12:15):    No growth      D DImer      Studies  Chest X-RAY  CT SCAN Chest   CT Abdomen  Venous Dopplers: LE:   Others      < from: Xray Chest 1 View- PORTABLE-Urgent (Xray Chest 1 View- PORTABLE-Urgent .) (08.13.23 @ 07:09) >    FINDINGS:  Since the last study, a left IJ line has been placed and its tip is at   the origin of the SVC. No complicating pneumothorax.    Endotracheal tube with tip just above the ines and enteric tube in   place.    Vascular congestion with right pleural thickening unchanged.    Heart is not enlarged with a loop recorder. No effusions visualized.        COMPARISON: August 12        IMPRESSION: Status post left IJ central line placement.    --- End of Report ---            LIZA WOODY MD; Attending Radiologist  This document has been electronically signed. Aug 13 2023 10:08AM    < end of copied text >  < from: Xray Chest 1 View- PORTABLE-Urgent (Xray Chest 1 View- PORTABLE-Urgent .) (08.12.23 @ 06:08) >    FINDINGS:  August 11 at 11:39 PM:  Endotracheal tube has its tip above the ines. Enteric tube with   sidehole and tip in the body of the stomach.    New left IJ central line with tip in the SVC. No complicating   pneumothorax. No change in the appearance of the heart, lungs or pleura.    August 12 at 5:14 AM:  Enteric tube has been advanced and the sidehole and tip remain in the   body of the stomach. Endotracheal tube again seen with tip just above the   ines.    Left IJ line no longer seen. Lungs show no effusions or pneumothorax.        COMPARISON: August 11 at 7:29 AM        IMPRESSION:  Endotracheal tube with tip above the ines.  Left IJ central line pre and post removal.    No layering effusion or pneumothorax.    --- End of Report ---            LIZA WOODY MD; Attending Radiologist  This document has been electronically signed. Aug 12 2023 11:47AM    < end of copied text >

## 2023-08-14 NOTE — PROGRESS NOTE ADULT - SUBJECTIVE AND OBJECTIVE BOX
Subjective: Patient seen and examined. No new events except as noted.   remains intubated in ICU     REVIEW OF SYSTEMS:  Unable to obtain       MEDICATIONS:  MEDICATIONS  (STANDING):  albumin human 25% IVPB 50 milliLiter(s) IV Intermittent every 6 hours  albuterol/ipratropium for Nebulization 3 milliLiter(s) Nebulizer every 6 hours  aspirin  chewable 81 milliGRAM(s) Oral daily  atorvastatin 40 milliGRAM(s) Oral at bedtime  buMETAnide Infusion 2 mG/Hr (10 mL/Hr) IV Continuous <Continuous>  ceFAZolin   IVPB 1000 milliGRAM(s) IV Intermittent every 12 hours  chlorhexidine 0.12% Liquid 15 milliLiter(s) Oral Mucosa every 12 hours  chlorhexidine 2% Cloths 1 Application(s) Topical daily  dextrose 5%. 1000 milliLiter(s) (100 mL/Hr) IV Continuous <Continuous>  dextrose 5%. 1000 milliLiter(s) (50 mL/Hr) IV Continuous <Continuous>  dextrose 50% Injectable 12.5 Gram(s) IV Push once  dextrose 50% Injectable 25 Gram(s) IV Push once  dextrose 50% Injectable 25 Gram(s) IV Push once  glucagon  Injectable 1 milliGRAM(s) IntraMuscular once  heparin   Injectable 5000 Unit(s) SubCutaneous every 8 hours  insulin lispro (ADMELOG) corrective regimen sliding scale   SubCutaneous every 6 hours  insulin NPH human recombinant 5 Unit(s) SubCutaneous every 6 hours  midodrine 10 milliGRAM(s) Oral every 8 hours  norepinephrine Infusion 0.08 MICROgram(s)/kG/Min (4.99 mL/Hr) IV Continuous <Continuous>  petrolatum Ophthalmic Ointment 1 Application(s) Both EYES every 12 hours  sodium chloride 3%  Inhalation 4 milliLiter(s) Inhalation every 6 hours      PHYSICAL EXAM:  T(C): 37 (08-14-23 @ 04:00), Max: 38.2 (08-13-23 @ 20:00)  HR: 102 (08-14-23 @ 09:00) (76 - 111)  BP: --  RR: 14 (08-14-23 @ 08:00) (14 - 128)  SpO2: 100% (08-14-23 @ 09:00) (99% - 100%)  Wt(kg): --  I&O's Summary    13 Aug 2023 07:01  -  14 Aug 2023 07:00  --------------------------------------------------------  IN: 2229 mL / OUT: 360 mL / NET: 1869 mL          Appearance: NAD, intubated, sedated	  HEENT: dry oral mucosa, L IJ AMBROCIO   Lymphatic: No lymphadenopathy  Cardiovascular: Normal S1 S2, No JVD, No murmurs, No edema  Respiratory:  Decreased BS, intubated on ventilator	  Psychiatry: A & O x 0, sedated  Gastrointestinal: Soft, Non-tender, + BS, + OGT	  Skin: No rashes, No ecchymoses, No cyanosis	  Extremities: No strength/ROM 2/2 sedation, + BL LE edema  Vascular: Peripheral pulses palpable 2+ bilaterally  +willard    LABS:    CARDIAC MARKERS:                                7.3    12.99 )-----------( 202      ( 14 Aug 2023 01:10 )             23.3     08-14    132<L>  |  96<L>  |  56<H>  ----------------------------<  289<H>  4.3   |  22  |  3.79<H>    Ca    8.6      14 Aug 2023 01:10  Phos  4.2     08-14  Mg     2.70     08-14    TPro  5.3<L>  /  Alb  1.8<L>  /  TBili  <0.2  /  DBili  x   /  AST  11  /  ALT  9   /  AlkPhos  124<H>  08-14    proBNP:   Lipid Profile:   HgA1c:   TSH:         Blood Gas Profile - Arterial (08.14.23 @ 01:10)   pH, Arterial: 7.33  pCO2, Arterial: 45 mmHg  pO2, Arterial: 140 mmHg  HCO3, Arterial: 24 mmol/L  Base Excess, Arterial: -2.1 mmol/L  Oxygen Saturation, Arterial: 99.7 %  Total CO2, Arterial: 25 mmol/L  FIO2, Arterial: 30  Blood Gas Source Arterial: ArterialBlood Gas Arterial - Calcium, Ionized: 1.25 mmol/L (08.14.23 @ 01:10)     TELEMETRY: 	  SR  ECG:  	  RADIOLOGY:   DIAGNOSTIC TESTING:  [ ] Echocardiogram:  [ ]  Catheterization:  [ ] Stress Test:    OTHER:

## 2023-08-15 NOTE — PROGRESS NOTE ADULT - SUBJECTIVE AND OBJECTIVE BOX
Subjective: Patient seen and examined. No new events except as noted.     REVIEW OF SYSTEMS:    Unable to obtain     MEDICATIONS:  MEDICATIONS  (STANDING):  albumin human 25% IVPB 50 milliLiter(s) IV Intermittent every 6 hours  albuterol/ipratropium for Nebulization 3 milliLiter(s) Nebulizer every 6 hours  aspirin  chewable 81 milliGRAM(s) Oral daily  atorvastatin 40 milliGRAM(s) Oral at bedtime  buMETAnide Infusion 2 mG/Hr (10 mL/Hr) IV Continuous <Continuous>  chlorhexidine 0.12% Liquid 15 milliLiter(s) Oral Mucosa every 12 hours  chlorhexidine 2% Cloths 1 Application(s) Topical daily  dextrose 5%. 1000 milliLiter(s) (100 mL/Hr) IV Continuous <Continuous>  dextrose 5%. 1000 milliLiter(s) (50 mL/Hr) IV Continuous <Continuous>  dextrose 50% Injectable 25 Gram(s) IV Push once  dextrose 50% Injectable 12.5 Gram(s) IV Push once  dextrose 50% Injectable 25 Gram(s) IV Push once  glucagon  Injectable 1 milliGRAM(s) IntraMuscular once  heparin   Injectable 5000 Unit(s) SubCutaneous every 8 hours  insulin lispro (ADMELOG) corrective regimen sliding scale   SubCutaneous every 6 hours  insulin NPH human recombinant 5 Unit(s) SubCutaneous every 6 hours  midodrine 10 milliGRAM(s) Oral every 8 hours  norepinephrine Infusion 0.08 MICROgram(s)/kG/Min (4.99 mL/Hr) IV Continuous <Continuous>  sodium chloride 3%  Inhalation 4 milliLiter(s) Inhalation every 6 hours      PHYSICAL EXAM:  T(C): 37.3 (08-15-23 @ 08:00), Max: 37.9 (08-15-23 @ 00:00)  HR: 87 (08-15-23 @ 09:23) (78 - 97)  BP: --  RR: 26 (08-15-23 @ 08:00) (13 - 28)  SpO2: 100% (08-15-23 @ 08:08) (98% - 100%)  Wt(kg): --  I&O's Summary    14 Aug 2023 07:01  -  15 Aug 2023 07:00  --------------------------------------------------------  IN: 1461.5 mL / OUT: 482 mL / NET: 979.5 mL            Appearance: NAD, intubated, sedated	  HEENT: dry oral mucosa, L IJ AMBROCIO   Lymphatic: No lymphadenopathy  Cardiovascular: Normal S1 S2, No JVD, No murmurs, No edema  Respiratory:  Decreased BS, intubated on ventilator	  Psychiatry: A & O x 0, sedated  Gastrointestinal: Soft, Non-tender, + BS, + OGT	  Skin: No rashes, No ecchymoses, No cyanosis	  Extremities: No strength/ROM 2/2 sedation, + BL LE edema  Vascular: Peripheral pulses palpable 2+ bilaterally  +willard      LABS:    CARDIAC MARKERS:    Blood Gas Profile - Arterial (08.15.23 @ 02:06)   pH, Arterial: 7.36  pCO2, Arterial: 44 mmHg  pO2, Arterial: 251 mmHg  HCO3, Arterial: 25 mmol/L  Base Excess, Arterial: -0.5 mmol/L  Oxygen Saturation, Arterial: 99.0 %  Total CO2, Arterial: 26 mmol/L  FIO2, Arterial: 30    Blood Gas Arterial - Calcium, Ionized: 1.22 mmol/L (08.15.23 @ 02:06)                             6.5    11.69 )-----------( 196      ( 15 Aug 2023 02:06 )             20.9     08-15    133<L>  |  97<L>  |  62<H>  ----------------------------<  185<H>  4.1   |  22  |  4.12<H>    Ca    8.7      15 Aug 2023 02:06  Phos  3.8     08-15  Mg     2.80     08-15    TPro  5.7<L>  /  Alb  2.5<L>  /  TBili  <0.2  /  DBili  x   /  AST  18  /  ALT  9   /  AlkPhos  117  08-1    TELEMETRY: 	SR    ECG:  	  RADIOLOGY:   DIAGNOSTIC TESTING:  [ ] Echocardiogram:  [ ]  Catheterization:  [ ] Stress Test:    OTHER: 	    TECH INFORMATION:   This is a Continuous Video EEG.     Recording Technique: The patient underwent continuous video-EEG monitoring, using Telemetry System hardware on the [employer]yt[employer].  EEG and video data were stored on a computer hard drive with important events saved in digital archive files. The material was reviewed by a physician (electroencephalographer/epileptologist) on a daily basis.  Juan Jose and seizure detection algorithms were utilized and reviewed.  An EEG Technician attended to the patient for 8 to 10 hours per day. The patient was observed by the epilepsy nursing staff 24 hours per day. The epilepsy center neurologist was available in person or on call 24 hours per day..     Placement and Labeling of Electrodes: The EEG was performed utilizing at least 20 channel referential EEG connections (coronal over temporal over parasagittal montage) with inferior temporal electrodes when indicting and using all standard 10-20 electrode placements with EKG, with additional electrodes placed in the inferior temporal region using the modified 10-10 montage electrode placements for elective admissions, or if deemed necessary.  Recording was at  a sampling rate of 256 samples per second per channel. Time synchronized digital video recording was done simultaneously with EEG recording.  A low light infrared camera was used for low light recording..    EEG REPORT:   EEG Report:  · EEG Report	  MILANA BALL [id number]     Study Date: 	08-14-23 0800	08-14-23 1900  Duration x Hours: 11 hours  --------------------------------------------------------------------------------------------------  History:  CC/ HPI Patient is a 73y old  Female who presents with a chief complaint of Respiratory distress (12 Aug 2023 10:42)    --------------------------------------------------------------------------------------------------  Study Interpretation:    [[[Abbreviation Key:  PDR=alpha rhythm/posterior dominant rhythm. A-P=anterior posterior.  Amplitude: ‘very low’:<20; ‘low’:20-49; ‘medium’:; ‘high’:>150uV.  Persistence for periodic/rhythmic patterns (% of epoch) ‘rare’:<1%; ‘occasional’:1-10%; ‘frequent’:10-50%; ‘abundant’:50-90%; ‘continuous’:>90%.  Persistence for sporadic discharges: ‘rare’:<1/hr; ‘occasional’:1/min-1/hr; ‘frequent’:>1/min; ‘abundant’:>1/10 sec.  RPP=rhythmic and periodic patterns; GRDA=generalized rhythmic delta activity; FIRDA=frontal intermittent GRDA; LRDA=lateralized rhythmic delta activity; TIRDA=temporal intermittent rhythmic delta activity;  LPD=PLED=lateralized periodic discharges; GPD=generalized periodic discharges; BIPDs =bilateral independent periodic discharges; Mf=multifocal; SIRPDs=stimulus induced rhythmic, periodic, or ictal appearing discharges; BIRDs=brief potentially ictal rhythmic discharges >4 Hz, lasting .5-10s; PFA (paroxysmal bursts >13 Hz or =8 Hz <10s).  Modifiers: +F=with fast component; +S=with spike component; +R=with rhythmic component.  S-B=burst suppression pattern.  Max=maximal. N1-drowsy; N2-stage II sleep; N3-slow wave sleep. SSS/BETS=small sharp spikes/benign epileptiform transients of sleep. HV=hyperventilation; PS=photic stimulation]]]    Daily EEG Visual Analysis    FINDINGS:      Background:  Continuity: discontinuous  Symmetry: symmetric  PDR: non.  Reactivity: absent  Voltage: normal  Anterior Posterior Gradient: Absent  Other background findings: none  Breach: absent    Background Slowing:  Generalized slowing: Diffuse polymorphic delta/theta slowing, intermittently with a triphasic appearance.   Focal slowing: none was present.    State Changes:   -Absent    Sporadic Epileptiform Discharges:    None    Rhythmic and Periodic Patterns (RPPs):  None     Electrographic and Electroclinical seizures:  None    Other Clinical Events:  none    Activation Procedures:   -Hyperventilation was not performed.    -Photic stimulation was not performed.    Artifacts:  Intermittent myogenic and movement artifacts were noted.    ECG:  The heart rate on single channel ECG was predominantly between 100 to 110 BPM.    EEG Classification / Summary:  Abnormal EEG in lethargic patient.  - Severe background slowing with discontinuity    Clinical Impression:  - Severe diffuse non-specific cerebral dysfunction  - There were no epileptiform abnormalities or seizures recorded.      Evens Akhtar MD  EEG/Epilepsy Attending     -------------------------------------------------------------------------------------------------------  [facility] EEG Reading Room Ph#: [phone]  Epilepsy Answering Service after 5PM and before 8:30AM: Ph#: [phone]        Electronic Signatures:  Evens Akhtar)  (Signed 15-Aug-2023 08:21)  	Authored: TECH INFORMATION, EEG REPORT      Last Updated: 15-Aug-2023 08:21 by Evens Akhtar)

## 2023-08-15 NOTE — PROGRESS NOTE ADULT - SUBJECTIVE AND OBJECTIVE BOX
NEPHROLOGY     Patient seen and examined with family at bedside, intubated.     MEDICATIONS  (STANDING):  albumin human 25% IVPB 50 milliLiter(s) IV Intermittent every 6 hours  albuterol/ipratropium for Nebulization 3 milliLiter(s) Nebulizer every 6 hours  aspirin  chewable 81 milliGRAM(s) Oral daily  atorvastatin 40 milliGRAM(s) Oral at bedtime  buMETAnide Infusion 2 mG/Hr (10 mL/Hr) IV Continuous <Continuous>  chlorhexidine 0.12% Liquid 15 milliLiter(s) Oral Mucosa every 12 hours  chlorhexidine 2% Cloths 1 Application(s) Topical daily  dextrose 5%. 1000 milliLiter(s) (100 mL/Hr) IV Continuous <Continuous>  dextrose 5%. 1000 milliLiter(s) (50 mL/Hr) IV Continuous <Continuous>  dextrose 50% Injectable 25 Gram(s) IV Push once  dextrose 50% Injectable 12.5 Gram(s) IV Push once  dextrose 50% Injectable 25 Gram(s) IV Push once  glucagon  Injectable 1 milliGRAM(s) IntraMuscular once  heparin   Injectable 5000 Unit(s) SubCutaneous every 8 hours  insulin lispro (ADMELOG) corrective regimen sliding scale   SubCutaneous every 6 hours  insulin NPH human recombinant 5 Unit(s) SubCutaneous every 6 hours  midodrine 10 milliGRAM(s) Oral every 8 hours  norepinephrine Infusion 0.08 MICROgram(s)/kG/Min (4.99 mL/Hr) IV Continuous <Continuous>  sodium chloride 3%  Inhalation 4 milliLiter(s) Inhalation every 6 hours    VITALS:  T(C): , Max: 37.9 (08-15-23 @ 00:00)  T(F): , Max: 100.2 (08-15-23 @ 00:00)  HR: 95 (08-15-23 @ 10:49)  BP(mean): --RR: 14 (08-15-23 @ 10:00)  SpO2: 98% (08-15-23 @ 10:49)    I and O's:    08-14 @ 07:01  -  08-15 @ 07:00  --------------------------------------------------------  IN: 1461.5 mL / OUT: 482 mL / NET: 979.5 mL    PHYSICAL EXAM:  Constitutional: intubated/sedated  HEENT: (+)ETT/OGT  Neck: Supple, No JVD  Respiratory: coarse BS b/l  Cardiovascular: RRR s1s2, no m/r/g  Gastrointestinal: BS+, soft, NT/ND  GI: (+)willard  Extremities: No peripheral edema b/l  Back: no CVAT b/l  Skin: No rashes, no nevi    LABS:                        6.5    11.69 )-----------( 196      ( 15 Aug 2023 02:06 )             20.9     08-15    133<L>  |  97<L>  |  62<H>  ----------------------------<  185<H>  4.1   |  22  |  4.12<H>    Ca    8.7      15 Aug 2023 02:06  Phos  3.8     08-15  Mg     2.80     08-15    TPro  5.7<L>  /  Alb  2.5<L>  /  TBili  <0.2  /  DBili  x   /  AST  18  /  ALT  9   /  AlkPhos  117  08-14    Urine Studies:  Urinalysis Basic - ( 15 Aug 2023 02:06 )    Color: x / Appearance: x / SG: x / pH: x  Gluc: 185 mg/dL / Ketone: x  / Bili: x / Urobili: x   Blood: x / Protein: x / Nitrite: x   Leuk Esterase: x / RBC: x / WBC x   Sq Epi: x / Non Sq Epi: x / Bacteria: x

## 2023-08-15 NOTE — PROGRESS NOTE ADULT - ASSESSMENT
IMPRESSION: 73F w/ HTN, DM2, CVA, breast CA-bilateral mastectomy, recurrent aspiration pneumonia/respiratory failure, and CKD, 8/11/23 p/w acute hypercapnic respiratory failure; c/b RUSS    (1)Renal - CKD4     (2)RUSS - most likely ATN; must also consider possibility of AIN, given the rash from recent rash/eosinophilia. There was no pyuria on UA on 8/11/23, prior to onset of the RUSS. Creatinine slightly higher, corresponding with GFR~10-15ml/min. No need for dialysis for now    (3)Hyperkalemia - improved relative to 1-2 days ago    (4)Pulm- hypercapnic respiratory failure - intubated    (5)ID - PNA - on IV Abx    (6)CV - shock - pressor requirements decreasing    (7)Anemia - s/p PRBC this morning     RECOMMEND:  (1)Can give IV diuretics to promote urine output/convert to nonoliguric status  (2)Dose new meds for GFR 10-15ml/min  (3)BMP+MG+PO4 daily  (4)No HD for now    D/w Dr. Isaiah Espinoza, [facility]  [phone]

## 2023-08-15 NOTE — PROGRESS NOTE ADULT - ATTENDING COMMENTS
Patient examined and case reviewed in detail on bedside rounds  Critically ill and unstable on vent with unexplained coma/hypercapneic resp failure/drug rash/RUSS  Frequent bedside visits with therapy change today.   I have personally provided 35+ minutes of critical care time concurrently with the resident/fellow; this excludes time spent on separate procedures.  I have personally provided 75+ minutes of critical care time concurrently with the resident/fellow; this excludes time spent on separate procedures.

## 2023-08-15 NOTE — PROGRESS NOTE ADULT - ASSESSMENT
74 y/o F well known to me from my [facility] [place] practice. she was admitted at [facility] 7/12-7/22 w aspiration PNA, was treated w CEFEPIME, developed an allergic rash,  dCHF, + MAC on AFB culture, had been progressively getting more and more lethargic and dyspneic at home since DC.   In  am of 8/11/23  ptn presented with respiratory distress w hypoxia and hypercarbia requiring intubation 2/2 volume overload +/- Asp PNA      Neuro   off sedation  well known to Dr. Bianchi, consult reviewed   Baseline MS AOx3, aphasic   - h/o CVA , on aspirin and statin . resumed w feeding tube  - eeg  2/2 tremors, no sz focus    Cardiac   Ptn well known to Dr. Dunn, consult reviewed   CHFpEF   TTE 7/2023 with EF 59%, with severe LVH and diastolic dysfunction   pro-BNP > 57336, trop 78       Pulmonary   Acute hypercapnic and hypoxemic respiratory failure   well known to Dr. Mcnulty, consult reviewed   DDx: aspiration vs volume overload   VBG with respiratory acidosis pCO2 111  CT chest: mod ( worsening) R pl effusion  - cefepime DCed on 8/13, started on Moxifloxacin on 8/13, on 8/14 off ABx    Renal   Hyperkalemia with EKG changes  , initially treated w LOKELMA,  now resolved   Ptn well know to Dr. Colon, consult reviewed   7.2, hemolyzed, given insulin and D50 + calcium gluc    oliguric RUSS 2/2 ATN, now on BUMEX drip, no neew for HD.       GI  NPO for now, on tube feeds  would start GI ppx w IV PPI    Endocrine  T2DM   A1C 7.1 in 7/2023   - BG goal 140-200     ID   No fever/leukocytosis, recheck temp   Hx latent TB which was treated, no concern for TB   - grew MAC on AFB culture from most recent admission. would call ID consult  Started on empiric vancomycin and aztreonam,  changed to cefepime 8/12, cefepime dced on 8/13(cefepime/zosyn allergy.  developed rash when on cefepime on previous admission ) . started on AVALOX on 8/13, off ABx on 8/14. ptn has an allergic rash, prob 2/2 cefepime  - f/u MRSA   - follow up blood culture/urine     Heme/Onc  ppx: heparin q8 hrs     Ethics  GOC - Discussed GOC with daughter and , they have opted in the past for full code. and she remains full code at present

## 2023-08-15 NOTE — PROGRESS NOTE ADULT - ASSESSMENT
74 y/o F DM on insulin, HTN, CKD,breast CA, respiratory arrest and cardiac arrest (2018), CVA with residual weakness, aspiration PNA h/o trache, PEG, both removed presents with respiratory distress requiring intubation. Had recent admission d/c 7/22/23 for cough and respiratory distress (no intubation) thought to be i/s/o aspiration PNA s/p cefepime course; developed rash in response. Infectious w/u was negative at this time. Was discharged home, daughter and  at bedside providing history. Patient admitted to MICU for higher level of care, intubated & sedated on fentanyl & propofol. While in the MICU, patient was observed to have RUE shaking. Ativan given & EEG started, pending final report. Neurology consulted for further recommendations. Patient unable to offer history at this time. At bedside, daughter &  offer collateral. As per daughter, patient follows with Dr. Garner for stroke management since 2018 and Dr. Poon for tremors. Patient's tremors usually consist of RUE or head shaking, can be stopped by the patient with effort; this has been ongoing for 2-3 years. However, as of the past 2-3 weeks, patient has been experiencing more frequent, more severe RUE shaking intermittently; during these episodes patient is awake and alert. Does not take medications for tremors. No hx of vocal tremors. No hx of seizure, incontinence, tongue bite. For memory loss, patient has been taking Donepezil. Family inquiring about taking alternative supplements instead of Donepezil. At baseline patient wheelchair/bedbound and requires assistance with ADLs. CTH obtained 8/11, no interval change noted. EEG w/o evidence of seizures. As per  at bedside, patient opened eyes more and he saw some spontaneous movement of the RUE.     Impression:   1) Worsening RUE shaking of unclear etiology at this time; no evidence of seizures, resolved   2) AMS possibly related to TME, although still not waking up off sedation. No evidence of seizure on EEG. Slight dysconjugate gaze noted with roving eye movements. Possible brainstem infarct vs supratentorial infarcts causing roving eye movements. Can also consider other metabolic/ infectious / uremic encephalopathy   3) L PCA stroke, ESUS s/p ILR, also with bilateral centrum semiovale watershed infarcts   4) Dementia     Recommendations     [] Pending CTH  [] If CTH unrevealing and no change in mental status would attempt to obtain MRI brain if feasible  [] C/w home ASA 81 mg if no contraindications   [] C/w home Atorvastatin 10 mg qhs   [] DVT/GI ppx  [] Neurochecks q4hr   [] BP goals: Normotension   [] PT/OT when able   [] Diet: NPO w/ tube feeds    [] HgA1C goals < 7.0     D/w  at bedside     Katty Charles DO  Vascular Neurology  Office [phone]

## 2023-08-15 NOTE — PROGRESS NOTE ADULT - SUBJECTIVE AND OBJECTIVE BOX
Date of Service: 08-15-23 @ 12:22    Patient is a 73y old  Female who presents with a chief complaint of Respiratory distress (15 Aug 2023 11:51)      Any change in ROS: doing same:  still  intubated: off sedation: still lethargic and is on low dose pressors:       MEDICATIONS  (STANDING):  albuterol/ipratropium for Nebulization 3 milliLiter(s) Nebulizer every 6 hours  aspirin  chewable 81 milliGRAM(s) Oral daily  atorvastatin 40 milliGRAM(s) Oral at bedtime  chlorhexidine 0.12% Liquid 15 milliLiter(s) Oral Mucosa every 12 hours  chlorhexidine 2% Cloths 1 Application(s) Topical daily  dextrose 5%. 1000 milliLiter(s) (100 mL/Hr) IV Continuous <Continuous>  dextrose 5%. 1000 milliLiter(s) (50 mL/Hr) IV Continuous <Continuous>  dextrose 50% Injectable 25 Gram(s) IV Push once  dextrose 50% Injectable 12.5 Gram(s) IV Push once  dextrose 50% Injectable 25 Gram(s) IV Push once  glucagon  Injectable 1 milliGRAM(s) IntraMuscular once  heparin   Injectable 5000 Unit(s) SubCutaneous every 8 hours  insulin lispro (ADMELOG) corrective regimen sliding scale   SubCutaneous every 6 hours  insulin NPH human recombinant 5 Unit(s) SubCutaneous every 6 hours  midodrine 10 milliGRAM(s) Oral every 8 hours  norepinephrine Infusion 0.08 MICROgram(s)/kG/Min (4.99 mL/Hr) IV Continuous <Continuous>  sodium chloride 3%  Inhalation 4 milliLiter(s) Inhalation every 6 hours    MEDICATIONS  (PRN):  acetaminophen   Oral Liquid .. 650 milliGRAM(s) Oral every 6 hours PRN Temp greater or equal to 38C (100.4F), Mild Pain (1 - 3)  dextrose Oral Gel 15 Gram(s) Oral once PRN Blood Glucose LESS THAN 70 milliGRAM(s)/deciliter    Vital Signs Last 24 Hrs  T(C): 37.3 (15 Aug 2023 08:00), Max: 37.9 (15 Aug 2023 00:00)  T(F): 99.1 (15 Aug 2023 08:00), Max: 100.2 (15 Aug 2023 00:00)  HR: 95 (15 Aug 2023 10:49) (78 - 99)  BP: --  BP(mean): --  RR: 14 (15 Aug 2023 10:00) (13 - 28)  SpO2: 98% (15 Aug 2023 10:49) (98% - 100%)    Parameters below as of 15 Aug 2023 10:00  Patient On (Oxygen Delivery Method): ventilator    O2 Concentration (%): 30  Mode: AC/ CMV (Assist Control/ Continuous Mandatory Ventilation)  RR (machine): 14  TV (machine): 360  FiO2: 30  PEEP: 5  ITime: 0.8  MAP: 10  PIP: 22    I&O's Summary    14 Aug 2023 07:01  -  15 Aug 2023 07:00  --------------------------------------------------------  IN: 1461.5 mL / OUT: 482 mL / NET: 979.5 mL          Physical Exam:   GENERAL: NAD, well-groomed, well-developed  HEENT: MANDY/   Atraumatic, Normocephalic  ENMT: No tonsillar erythema, exudates, or enlargement; Moist mucous membranes, Good dentition, No lesions  NECK: Supple, No JVD, Normal thyroid  CHEST/LUNG: Clear to auscultaion-no wheezing  CVS: Regular rate and rhythm; No murmurs, rubs, or gallops  GI: : Soft, Nontender, Nondistended; Bowel sounds present  NERVOUS SYSTEM:  lethargic intubated:   EXTREMITIES:  - edema  LYMPH: No lymphadenopathy noted  SKIN: No rashes or lesions  ENDOCRINOLOGY: No Thyromegaly  PSYCH: lethargic    Labs:  ABG - ( 15 Aug 2023 02:06 )  pH, Arterial: 7.36  pH, Blood: x     /  pCO2: 44    /  pO2: 251   / HCO3: 25    / Base Excess: -0.5  /  SaO2: 99.0            28, 29, 37                            6.5    11.69 )-----------( 196      ( 15 Aug 2023 02:06 )             20.9                         7.3    12.99 )-----------( 202      ( 14 Aug 2023 01:10 )             23.3                         8.1    14.11 )-----------( 280      ( 13 Aug 2023 11:10 )             26.7                         7.7    8.56  )-----------( 240      ( 13 Aug 2023 01:44 )             25.2                         8.7    9.57  )-----------( 276      ( 12 Aug 2023 00:30 )             27.1     08-15    133<L>  |  97<L>  |  62<H>  ----------------------------<  185<H>  4.1   |  22  |  4.12<H>  08-14    133<L>  |  96<L>  |  60<H>  ----------------------------<  174<H>  4.2   |  23  |  4.07<H>  08-14    132<L>  |  96<L>  |  56<H>  ----------------------------<  289<H>  4.3   |  22  |  3.79<H>  08-13    132<L>  |  98  |  56<H>  ----------------------------<  256<H>  4.5   |  23  |  3.64<H>  08-13    134<L>  |  99  |  56<H>  ----------------------------<  257<H>  4.7   |  24  |  3.72<H>  08-13    135  |  98  |  53<H>  ----------------------------<  271<H>  5.0   |  25  |  3.50<H>  08-12    137  |  100  |  48<H>  ----------------------------<  227<H>  5.1   |  26  |  2.97<H>  08-11    141  |  108<H>  |  37<H>  ----------------------------<  261<H>  4.4   |  21<L>  |  1.76<H>    Ca    8.7      15 Aug 2023 02:06  Ca    8.5      14 Aug 2023 20:50  Ca    8.6      14 Aug 2023 01:10  Ca    8.2<L>      13 Aug 2023 19:14  Phos  3.8     08-15  Phos  4.4     08-14  Phos  4.2     08-14  Mg     2.80     08-15  Mg     2.80     08-14  Mg     2.70     08-14    TPro  5.7<L>  /  Alb  2.5<L>  /  TBili  <0.2  /  DBili  x   /  AST  18  /  ALT  9   /  AlkPhos  117  08-14  TPro  5.3<L>  /  Alb  1.8<L>  /  TBili  <0.2  /  DBili  x   /  AST  11  /  ALT  9   /  AlkPhos  124<H>  08-14  TPro  5.5<L>  /  Alb  1.8<L>  /  TBili  <0.2  /  DBili  x   /  AST  14  /  ALT  12  /  AlkPhos  115  08-13  TPro  6.4  /  Alb  2.2<L>  /  TBili  0.2  /  DBili  x   /  AST  17  /  ALT  16  /  AlkPhos  105  08-12  TPro  5.5<L>  /  Alb  1.9<L>  /  TBili  0.2  /  DBili  x   /  AST  18  /  ALT  14  /  AlkPhos  90  08-11    CAPILLARY BLOOD GLUCOSE      POCT Blood Glucose.: 226 mg/dL (15 Aug 2023 12:13)  POCT Blood Glucose.: 197 mg/dL (15 Aug 2023 05:24)  POCT Blood Glucose.: 195 mg/dL (14 Aug 2023 23:35)  POCT Blood Glucose.: 130 mg/dL (14 Aug 2023 17:43)      LIVER FUNCTIONS - ( 14 Aug 2023 20:50 )  Alb: 2.5 g/dL / Pro: 5.7 g/dL / ALK PHOS: 117 U/L / ALT: 9 U/L / AST: 18 U/L / GGT: x             Urinalysis Basic - ( 15 Aug 2023 02:06 )    Color: x / Appearance: x / SG: x / pH: x  Gluc: 185 mg/dL / Ketone: x  / Bili: x / Urobili: x   Blood: x / Protein: x / Nitrite: x   Leuk Esterase: x / RBC: x / WBC x   Sq Epi: x / Non Sq Epi: x / Bacteria: x            RECENT CULTURES:  08-11 @ 17:50 .Sputum Sputum   SIMON    Rare polymorphonuclear leukocytes per low power field  Rare Squamous epithelial cells per low power field  Numerous Gram positive cocci in pairs per oil power field    Staphylococcus aureus  Staphylococcus aureus     Numerous Staphylococcus aureus    08-11 @ 12:19 Clean Catch Clean Catch (Midstream)       rad< from: Xray Chest 1 View- PORTABLE-Urgent (Xray Chest 1 View- PORTABLE-Urgent .) (08.13.23 @ 07:09) >    ACC: [id number] EXAM:  XR CHEST PORTABLE URGENT 1V   ORDERED BY: NATTY ACKERMAN     PROCEDURE DATE:  08/13/2023          INTERPRETATION:  CLINICAL INFORMATION: Central line placement    TIME OF EXAMINATION: August 13 at 6:59 AM    EXAM: Portablechest    FINDINGS:  Since the last study, a left IJ line has been placed and its tip is at   the origin of the SVC. No complicating pneumothorax.    Endotracheal tube with tip just above the ines and enteric tube in   place.    Vascular congestion with right pleural thickening unchanged.    Heart is not enlarged with a loop recorder. No effusions visualized.        COMPARISON: August 12        IMPRESSION: Status post left IJ central line placement.    --- End of Report ---      < end of copied text >  < from: Transthoracic Echocardiogram (08.11.23 @ 16:09) >  (Stage I).  Right Heart:Normal right atrium. The right ventricle is  not well visualized; grossly normal right ventricular  systolic function. Normal tricuspid valve. Minimal  tricuspid regurgitation. Normal pulmonic valve.  Pericardium/PleuraNormal pericardium with no pericardial  effusion. Right pleural effusion.  ------------------------------------------------------------------------  CONCLUSIONS:  1. Mitral annular calcification, otherwise normal mitral  valve. Minimal mitral regurgitation.  2. Mild concentric leftventricular hypertrophy.  3. Endocardium not well visualized; grossly normal left  ventricular systolic function.  4. Mild diastolic dysfunction (Stage I).  5. The right ventricle is not well visualized; grossly  normal right ventricular systolic function.  6. Right pleural effusion.  ------------------------------------------------------------------------  Confirmed on  8/11/2023 - 17:36:17 by Doni Gillis M.D.,    < end of copied text >           No growth    08-11 @ 08:11 .Blood Blood-Peripheral                No growth at 72 Hours          RESPIRATORY CULTURES:          Studies  Chest X-RAY  CT SCAN Chest   Venous Dopplers: LE:   CT Abdomen  Others

## 2023-08-15 NOTE — PROGRESS NOTE ADULT - PROBLEM SELECTOR PLAN 1
Multifactorial   Aspiration, acute on chronic diastolic CHF   completed  IV abx   Bumex gtt as ordered. Monitor UO   Ventilatory and hemodynamic support

## 2023-08-15 NOTE — PROGRESS NOTE ADULT - ASSESSMENT
74 y/o F DM on insulin, HTN, CKD, CHFpEF, breast CA, respiratory arrest and cardiac arrest (2018), CVA with residual weakness, aspiration PNA h/o trache, PEG, both removed presents with respiratory distress requiring intubation. May be volume overload i/s/o CHF/CKD vs decrease respiratory drive (given oxygen at home). Worsening mental status off sedation, will further work up with imaging.     Neuro   #Metabolic encephalopathy   Baseline MS AOx3   CT head without acute change  Previously on prop and fentanyl which were weaned, now MS slightly improved - following commands and slightly responsive to pain, still not triggering vent   Spoke with neurology, not inclined to get LP   EEG without epileptiform activity   Appears to have disconjugate eyes   - CT head ordered   - MRI brain also ordered to visualize brainstem     #CVA   - c/w aspirin and statin     Cardiac   #Shock  - Likely mixed septic and vasoplegic from prop  - weaned vaso and levo, now HDS  - c/w midodrine 10mg q8hr for further weaning  - holding home coreg 37.5 BID    #CHFpEF   TTE 7/2023 with EF 59%, with severe LVH and diastolic dysfunction   pro-BNP > 23630, trop 78   Repeat TTE with EF 60% normal systolic and grade 1 diastolic dysfunction   - holding home coreg 37.5 BID     Pulmonary   #Acute hypercapnic and hypoxemic respiratory failure   DDx: aspiration vs volume overload vs sedating medication decreasing drive (was given nyquil)   VBG with respiratory acidosis pCO2 111  CXR with small right pleural effusion, no consolidations/opacities  - c/w mechanical ventilation   - monitor blood gas while on vent      /Renal   #RUSS on CKD   Ddx: obstructive (willard in, no hydro on POCUS) vs low flow state vs AIN i/s/o cephalosporin use and drug rash   - Urinalysis with 7 WBC, not pursing steroids at this time per nephro  - Rising creatinine, likely ATN   - Continue to monitor, dose meds per eGFR. avoid nephrotoxic agents  - No acute HD needs at this time    #Hyperkalemia with EKG changes    7.2, hemolyzed, given insulin and D50 + calcium gluc   Likely i/s/o renal dysfunction   Received lasix 80 and 40 IV initially but without adequate response   Given bumex 2mg the morning of 8/12  Per nephro, started on bumex gtt with worsening creatinine   - decreasing urine output, now oliguric   - discontinued bumex gtt       GI  Diet: NPO with tube feeds     Endocrine  #T2DM   A1C 7.1 in 7/2023   - c/w NPH to 5U q6  - BG goal 140-200     ID   No fever/leukocytosis, recheck temp   Hx latent TB which was treated, no concern for TB   Started on empiric vancomycin and aztreonam (cefepime/zosyn allergy? developed rash req prednisone)   Trialed cefepime --> cefazolin (sputum MSSA), developed drug eruption   - Blood and urine cultures demonstrating no growth currently  - monitor off antibx if afebrile     Heme/Onc  ppx: heparin q8 hrs     Ethics  GOC - Per previous GOC with daughter and , reported that they have not talked about end of life care with the patient. For now, they wanted "everything to be done" including CPR, continuing with mech ventilation/intubation, pressors

## 2023-08-15 NOTE — EEG REPORT - NS EEG TEXT BOX
MILANA BALL [id number]     Study Date: 	08-14-23 0800	08-14-23 1900  Duration x Hours: 11 hours  --------------------------------------------------------------------------------------------------  History:  CC/ HPI Patient is a 73y old  Female who presents with a chief complaint of Respiratory distress (12 Aug 2023 10:42)    --------------------------------------------------------------------------------------------------  Study Interpretation:    [[[Abbreviation Key:  PDR=alpha rhythm/posterior dominant rhythm. A-P=anterior posterior.  Amplitude: ‘very low’:<20; ‘low’:20-49; ‘medium’:; ‘high’:>150uV.  Persistence for periodic/rhythmic patterns (% of epoch) ‘rare’:<1%; ‘occasional’:1-10%; ‘frequent’:10-50%; ‘abundant’:50-90%; ‘continuous’:>90%.  Persistence for sporadic discharges: ‘rare’:<1/hr; ‘occasional’:1/min-1/hr; ‘frequent’:>1/min; ‘abundant’:>1/10 sec.  RPP=rhythmic and periodic patterns; GRDA=generalized rhythmic delta activity; FIRDA=frontal intermittent GRDA; LRDA=lateralized rhythmic delta activity; TIRDA=temporal intermittent rhythmic delta activity;  LPD=PLED=lateralized periodic discharges; GPD=generalized periodic discharges; BIPDs =bilateral independent periodic discharges; Mf=multifocal; SIRPDs=stimulus induced rhythmic, periodic, or ictal appearing discharges; BIRDs=brief potentially ictal rhythmic discharges >4 Hz, lasting .5-10s; PFA (paroxysmal bursts >13 Hz or =8 Hz <10s).  Modifiers: +F=with fast component; +S=with spike component; +R=with rhythmic component.  S-B=burst suppression pattern.  Max=maximal. N1-drowsy; N2-stage II sleep; N3-slow wave sleep. SSS/BETS=small sharp spikes/benign epileptiform transients of sleep. HV=hyperventilation; PS=photic stimulation]]]    Daily EEG Visual Analysis    FINDINGS:      Background:  Continuity: discontinuous  Symmetry: symmetric  PDR: non.  Reactivity: absent  Voltage: normal  Anterior Posterior Gradient: Absent  Other background findings: none  Breach: absent    Background Slowing:  Generalized slowing: Diffuse polymorphic delta/theta slowing, intermittently with a triphasic appearance.   Focal slowing: none was present.    State Changes:   -Absent    Sporadic Epileptiform Discharges:    None    Rhythmic and Periodic Patterns (RPPs):  None     Electrographic and Electroclinical seizures:  None    Other Clinical Events:  none    Activation Procedures:   -Hyperventilation was not performed.    -Photic stimulation was not performed.    Artifacts:  Intermittent myogenic and movement artifacts were noted.    ECG:  The heart rate on single channel ECG was predominantly between 100 to 110 BPM.    EEG Classification / Summary:  Abnormal EEG in lethargic patient.  - Severe background slowing with discontinuity    Clinical Impression:  - Severe diffuse non-specific cerebral dysfunction  - There were no epileptiform abnormalities or seizures recorded.      Evens Akhtar MD  EEG/Epilepsy Attending     -------------------------------------------------------------------------------------------------------  [facility] EEG Reading Room Ph#: [phone]  Epilepsy Answering Service after 5PM and before 8:30AM: Ph#: [phone]

## 2023-08-15 NOTE — PROGRESS NOTE ADULT - ASSESSMENT
74 y/o F DM on insulin, HTN, CKD, CHFpEF, breast CA, respiratory arrest and cardiac arrest (2018), CVA with residual weakness, aspiration PNA h/o trache, PEG, both removed presents with respiratory distress requiring intubation. Found to have elevated BNP, may be 2/2 to volume overload i/s/o CKD vs CHF.     Neuro ;  - Sedated : Baseline MS AOx3   -WAS ON PROPOFOL AND FENTANYL BEFORE: NOW OFF:    - Monitor her mental status:  requiring only 30% fio2:    -8/15: now she has been off sedation for more then 24 hours but is still lethargic: her o2 requirement has not increased:   -for possible extubation if she wakes up   CVA   - cont aspirin and statin   Cardiac   -CHFpEF : TTE 7/2023 with EF 59%, with severe LVH and diastolic dysfunction : pro-BNP > 76274, trop 78   -on bumex drip :  -cards following  -on 8/15: she is off bumex drip and is on midodrine   Pulmonary   -Acute hypercapnic and hypoxemic respiratory failure   -DDx: aspiration vs volume overload  VBG with respiratory acidosis pCO2 111  - CXR with small right pleural effusion, no consolidations/opacities  -now she seems better:  fio2: 30$:  -? etiology of hypercarbia:  multifactorial : seems to have OHS and TYRONE superimposed by acute chf  ? and aspiration pneumonia:"   -pt is mostly bed bound:   -it is possible that this time:  she mght need bipap on dc : atleast at the night time:   -on 8/15: still remains intubated  /Renal   -Hyperkalemia with EKG changes  : 7.2, hemolyzed, given insulin and D50 + calcium gluc   -K is better today : cont to monitor  -normal today on 8/15   GI  NPO for now  Endocrine  T2DM : A1C 7.1 in 7/2023   -cont to monitor blood glucose  ID   - No fever/leukocytosis  - Hx latent TB which was treated, no concern for TB  : She was recently ruledout for tuberculosis at [facility]   - Started on empiric vancomycin and aztreonam (cefepime/zosyn allergy? developed rash req prednisone)  : now dced:   defer to MICU   - f/u MRSA   - follow up blood culture/urine     dw  and micu and daughter

## 2023-08-15 NOTE — PROGRESS NOTE ADULT - SUBJECTIVE AND OBJECTIVE BOX
Neurology Progress Note    S: Patient seen and examined. Concern for dysconjugate gaze.     Medication:  acetaminophen   Oral Liquid .. 650 milliGRAM(s) Oral every 6 hours PRN  albuterol/ipratropium for Nebulization 3 milliLiter(s) Nebulizer every 6 hours  aspirin  chewable 81 milliGRAM(s) Oral daily  atorvastatin 40 milliGRAM(s) Oral at bedtime  chlorhexidine 0.12% Liquid 15 milliLiter(s) Oral Mucosa every 12 hours  chlorhexidine 2% Cloths 1 Application(s) Topical daily  dextrose 5%. 1000 milliLiter(s) IV Continuous <Continuous>  dextrose 5%. 1000 milliLiter(s) IV Continuous <Continuous>  dextrose 50% Injectable 25 Gram(s) IV Push once  dextrose 50% Injectable 12.5 Gram(s) IV Push once  dextrose 50% Injectable 25 Gram(s) IV Push once  dextrose Oral Gel 15 Gram(s) Oral once PRN  glucagon  Injectable 1 milliGRAM(s) IntraMuscular once  heparin   Injectable 5000 Unit(s) SubCutaneous every 8 hours  insulin lispro (ADMELOG) corrective regimen sliding scale   SubCutaneous every 6 hours  insulin NPH human recombinant 5 Unit(s) SubCutaneous every 6 hours  midodrine 10 milliGRAM(s) Oral every 8 hours  norepinephrine Infusion 0.08 MICROgram(s)/kG/Min IV Continuous <Continuous>  sodium chloride 3%  Inhalation 4 milliLiter(s) Inhalation every 6 hours      Vitals:  Vital Signs Last 24 Hrs  T(C): 37.9 (15 Aug 2023 16:00), Max: 37.9 (15 Aug 2023 00:00)  T(F): 100.2 (15 Aug 2023 16:00), Max: 100.2 (15 Aug 2023 00:00)  HR: 88 (15 Aug 2023 20:00) (78 - 99)  BP: --  BP(mean): --  RR: 14 (15 Aug 2023 20:00) (12 - 28)  SpO2: 98% (15 Aug 2023 20:00) (97% - 100%)    Parameters below as of 15 Aug 2023 20:00  Patient On (Oxygen Delivery Method): ventilator    O2 Concentration (%): 30    General Exam:   General Appearance: Appropriately dressed and in no acute distress       Head: Normocephalic, atraumatic and no dysmorphic features  Ear, Nose, and Throat: Moist mucous membranes  CVS: S1S2+  Resp: No SOB, no wheeze or rhonchi  Abd: soft NTND  Extremities: No edema, no cyanosis  Skin: No bruises, no rashes     Neurological Exam:  Mental Status: Eyes closed, no spontaneous eye opening. Off sedation. Does not follow commands.   Cranial Nerves: PERRL slightly sluggish, L eye is slightly exodeviated at times but corrects to midline. Roving eye movements noted. No BTT. No facial palsy.   Motor: No spontaneous movement in any extremity  Sensation: No WD to noxious   Reflexes: 1+ throughout at biceps, brachioradialis, triceps, patellars and ankles bilaterally and equal. No clonus.     I personally reviewed the below data/images/labs:      CBC Full  -  ( 15 Aug 2023 12:00 )  WBC Count : 13.88 K/uL  RBC Count : 2.78 M/uL  Hemoglobin : 8.2 g/dL  Hematocrit : 25.4 %  Platelet Count - Automated : 214 K/uL  Mean Cell Volume : 91.4 fL  Mean Cell Hemoglobin : 29.5 pg  Mean Cell Hemoglobin Concentration : 32.3 gm/dL  Auto Neutrophil # : 9.95 K/uL  Auto Lymphocyte # : 0.40 K/uL  Auto Monocyte # : 1.12 K/uL  Auto Eosinophil # : 1.54 K/uL  Auto Basophil # : 0.05 K/uL  Auto Neutrophil % : 71.6 %  Auto Lymphocyte % : 2.9 %  Auto Monocyte % : 8.1 %  Auto Eosinophil % : 11.1 %  Auto Basophil % : 0.4 %    08-15    133<L>  |  97<L>  |  62<H>  ----------------------------<  185<H>  4.1   |  22  |  4.12<H>    Ca    8.7      15 Aug 2023 02:06  Phos  3.8     08-15  Mg     2.80     08-15    TPro  5.7<L>  /  Alb  2.5<L>  /  TBili  <0.2  /  DBili  x   /  AST  18  /  ALT  9   /  AlkPhos  117  08-14    LIVER FUNCTIONS - ( 14 Aug 2023 20:50 )  Alb: 2.5 g/dL / Pro: 5.7 g/dL / ALK PHOS: 117 U/L / ALT: 9 U/L / AST: 18 U/L / GGT: x             Urinalysis Basic - ( 15 Aug 2023 02:06 )    Color: x / Appearance: x / SG: x / pH: x  Gluc: 185 mg/dL / Ketone: x  / Bili: x / Urobili: x   Blood: x / Protein: x / Nitrite: x   Leuk Esterase: x / RBC: x / WBC x   Sq Epi: x / Non Sq Epi: x / Bacteria: x    vEEG:    Clinical Impression:  - Severe diffuse non-specific cerebral dysfunction  - There were no epileptiform abnormalities or seizures recorded.        CTH 8/11:    VENTRICLES AND SULCI: Age-appropriate involutional change  INTRA-AXIAL:  Old left PCA infarct as seen on the prior unchanged.   Microvascular ischemic changes involving the periventricular and   subcortical white matter as seen previously  EXTRA-AXIAL:  No mass or collection is seen.  VISUALIZED SINUSES:  Clear.  VISUALIZED MASTOIDS: Left mastoid sclerosis  CALVARIUM: Infiltrative appearance tothe calvarium may be indicative of   marrow infiltration on the basis of patient's known diagnosis of breast   cancer. MISCELLANEOUS:  None.    IMPRESSION:  No significant interval change compared with 7/17/2023 in   left PCA infarct which is old. Microvascular ischemic changes involving   the periventricular and subcortical white matter as seen   previously.Questionable lesions at the level of the calvarium related to   possible breast CA. Clinical correlation recommended.    --- End of Report ---

## 2023-08-15 NOTE — CHART NOTE - NSCHARTNOTEFT_GEN_A_CORE
: Elfego/Jatin    INDICATION: Respiratory failure    PROCEDURE:  [ x] LIMITED ECHO  [x ] LIMITED CHEST  [ ] LIMITED RETROPERITONEAL  [ ] LIMITED ABDOMINAL  [ ] LIMITED DVT  [ ] NEEDLE GUIDANCE VASCULAR  [ ] NEEDLE GUIDANCE THORACENTESIS  [ ] NEEDLE GUIDANCE PARACENTESIS  [ ] NEEDLE GUIDANCE PERICARDIOCENTESIS  [ ] OTHER    FINDINGS:     Lung: Multiple foci of individual B-lines and diffuse B-lines. Moderate anechoic R PLEFF. Irregular pleural surface    Cardiac: Normal GDE with limited AP4 view    INTERPRETATION: Normal GDe. Extensive interstitial changes with Irregular pleural surface indicating primary lung injury

## 2023-08-15 NOTE — PROGRESS NOTE ADULT - SUBJECTIVE AND OBJECTIVE BOX
INTERVAL HPI/OVERNIGHT EVENTS:    SUBJECTIVE: Patient seen and examined at bedside.     CONSTITUTIONAL: No weakness, fevers or chills  EYES/ENT: No visual changes;  No vertigo or throat pain   NECK: No pain or stiffness  RESPIRATORY: No cough, wheezing, hemoptysis; No shortness of breath  CARDIOVASCULAR: No chest pain or palpitations  GASTROINTESTINAL: No abdominal or epigastric pain. No nausea, vomiting, or hematemesis; No diarrhea or constipation. No melena or hematochezia.  GENITOURINARY: No dysuria, frequency or hematuria  NEUROLOGICAL: No numbness or weakness  SKIN: No itching, rashes    OBJECTIVE:    VITAL SIGNS:  ICU Vital Signs Last 24 Hrs  T(C): 37.1 (15 Aug 2023 12:00), Max: 37.9 (15 Aug 2023 00:00)  T(F): 98.7 (15 Aug 2023 12:00), Max: 100.2 (15 Aug 2023 00:00)  HR: 96 (15 Aug 2023 12:00) (78 - 99)  BP: --  BP(mean): --  ABP: 164/44 (15 Aug 2023 12:00) (82/24 - 164/44)  ABP(mean): 91 (15 Aug 2023 12:00) (43 - 91)  RR: 16 (15 Aug 2023 12:00) (13 - 28)  SpO2: 99% (15 Aug 2023 12:00) (98% - 100%)    O2 Parameters below as of 15 Aug 2023 12:00  Patient On (Oxygen Delivery Method): ventilator    O2 Concentration (%): 30      Mode: AC/ CMV (Assist Control/ Continuous Mandatory Ventilation), RR (machine): 14, TV (machine): 360, FiO2: 30, PEEP: 5, ITime: 0.8, MAP: 10, PIP: 22    08-14 @ 07:01  -  08-15 @ 07:00  --------------------------------------------------------  IN: 1461.5 mL / OUT: 482 mL / NET: 979.5 mL      CAPILLARY BLOOD GLUCOSE      POCT Blood Glucose.: 226 mg/dL (15 Aug 2023 12:13)      PHYSICAL EXAM:    General: NAD  HEENT: NC/AT; PERRL, clear conjunctiva  Neck: supple  Respiratory: CTA b/l  Cardiovascular: +S1/S2; RRR  Abdomen: soft, NT/ND; +BS x4  Extremities: WWP, 2+ peripheral pulses b/l; no LE edema  Skin: normal color and turgor; no rash  Neurological:    MEDICATIONS:  MEDICATIONS  (STANDING):  albuterol/ipratropium for Nebulization 3 milliLiter(s) Nebulizer every 6 hours  aspirin  chewable 81 milliGRAM(s) Oral daily  atorvastatin 40 milliGRAM(s) Oral at bedtime  chlorhexidine 0.12% Liquid 15 milliLiter(s) Oral Mucosa every 12 hours  chlorhexidine 2% Cloths 1 Application(s) Topical daily  dextrose 5%. 1000 milliLiter(s) (50 mL/Hr) IV Continuous <Continuous>  dextrose 5%. 1000 milliLiter(s) (100 mL/Hr) IV Continuous <Continuous>  dextrose 50% Injectable 25 Gram(s) IV Push once  dextrose 50% Injectable 12.5 Gram(s) IV Push once  dextrose 50% Injectable 25 Gram(s) IV Push once  glucagon  Injectable 1 milliGRAM(s) IntraMuscular once  heparin   Injectable 5000 Unit(s) SubCutaneous every 8 hours  insulin lispro (ADMELOG) corrective regimen sliding scale   SubCutaneous every 6 hours  insulin NPH human recombinant 5 Unit(s) SubCutaneous every 6 hours  midodrine 10 milliGRAM(s) Oral every 8 hours  norepinephrine Infusion 0.08 MICROgram(s)/kG/Min (4.99 mL/Hr) IV Continuous <Continuous>  sodium chloride 3%  Inhalation 4 milliLiter(s) Inhalation every 6 hours    MEDICATIONS  (PRN):  acetaminophen   Oral Liquid .. 650 milliGRAM(s) Oral every 6 hours PRN Temp greater or equal to 38C (100.4F), Mild Pain (1 - 3)  dextrose Oral Gel 15 Gram(s) Oral once PRN Blood Glucose LESS THAN 70 milliGRAM(s)/deciliter      ALLERGIES:  Allergies    isoniazid (Rash)  nafcillin (Unknown)  hydrALAZINE (Rash)  vitamin E (Short breath; Urticaria; Hives)  doxycycline (Rash)  cefepime (Rash)  NIFEdipine (Urticaria; Hives)    Intolerances        LABS:                        8.2    13.88 )-----------( 214      ( 15 Aug 2023 12:00 )             25.4     08-15    133<L>  |  97<L>  |  62<H>  ----------------------------<  185<H>  4.1   |  22  |  4.12<H>    Ca    8.7      15 Aug 2023 02:06  Phos  3.8     08-15  Mg     2.80     08-15    TPro  5.7<L>  /  Alb  2.5<L>  /  TBili  <0.2  /  DBili  x   /  AST  18  /  ALT  9   /  AlkPhos  117  08-14      Urinalysis Basic - ( 15 Aug 2023 02:06 )    Color: x / Appearance: x / SG: x / pH: x  Gluc: 185 mg/dL / Ketone: x  / Bili: x / Urobili: x   Blood: x / Protein: x / Nitrite: x   Leuk Esterase: x / RBC: x / WBC x   Sq Epi: x / Non Sq Epi: x / Bacteria: x        RADIOLOGY & ADDITIONAL TESTS: Reviewed. INTERVAL HPI/OVERNIGHT EVENTS:    SUBJECTIVE: Patient seen and examined at bedside. Intubated, off sedation     CONSTITUTIONAL: No weakness, fevers or chills  EYES/ENT: No visual changes;  No vertigo or throat pain   NECK: No pain or stiffness  RESPIRATORY: No cough, wheezing, hemoptysis; No shortness of breath  CARDIOVASCULAR: No chest pain or palpitations  GASTROINTESTINAL: No abdominal or epigastric pain. No nausea, vomiting, or hematemesis; No diarrhea or constipation. No melena or hematochezia.  GENITOURINARY: No dysuria, frequency or hematuria  NEUROLOGICAL: No numbness or weakness  SKIN: No itching, rashes    OBJECTIVE:    VITAL SIGNS:  ICU Vital Signs Last 24 Hrs  T(C): 37.1 (15 Aug 2023 12:00), Max: 37.9 (15 Aug 2023 00:00)  T(F): 98.7 (15 Aug 2023 12:00), Max: 100.2 (15 Aug 2023 00:00)  HR: 96 (15 Aug 2023 12:00) (78 - 99)  BP: --  BP(mean): --  ABP: 164/44 (15 Aug 2023 12:00) (82/24 - 164/44)  ABP(mean): 91 (15 Aug 2023 12:00) (43 - 91)  RR: 16 (15 Aug 2023 12:00) (13 - 28)  SpO2: 99% (15 Aug 2023 12:00) (98% - 100%)    O2 Parameters below as of 15 Aug 2023 12:00  Patient On (Oxygen Delivery Method): ventilator    O2 Concentration (%): 30      Mode: AC/ CMV (Assist Control/ Continuous Mandatory Ventilation), RR (machine): 14, TV (machine): 360, FiO2: 30, PEEP: 5, ITime: 0.8, MAP: 10, PIP: 22    08-14 @ 07:01  -  08-15 @ 07:00  --------------------------------------------------------  IN: 1461.5 mL / OUT: 482 mL / NET: 979.5 mL      CAPILLARY BLOOD GLUCOSE      POCT Blood Glucose.: 226 mg/dL (15 Aug 2023 12:13)      PHYSICAL EXAM:    General: NAD, intubated and follows some commands   HEENT:  PERRL, dysconjugate gaze   Respiratory: CTA b/l  Cardiovascular: +S1/S2; RRR  Abdomen: soft, NT/ND; +BS x4  Extremities: WWP, 2+ peripheral pulses b/l; no LE edema  Skin: erythematous rash     MEDICATIONS:  MEDICATIONS  (STANDING):  albuterol/ipratropium for Nebulization 3 milliLiter(s) Nebulizer every 6 hours  aspirin  chewable 81 milliGRAM(s) Oral daily  atorvastatin 40 milliGRAM(s) Oral at bedtime  chlorhexidine 0.12% Liquid 15 milliLiter(s) Oral Mucosa every 12 hours  chlorhexidine 2% Cloths 1 Application(s) Topical daily  dextrose 5%. 1000 milliLiter(s) (50 mL/Hr) IV Continuous <Continuous>  dextrose 5%. 1000 milliLiter(s) (100 mL/Hr) IV Continuous <Continuous>  dextrose 50% Injectable 25 Gram(s) IV Push once  dextrose 50% Injectable 12.5 Gram(s) IV Push once  dextrose 50% Injectable 25 Gram(s) IV Push once  glucagon  Injectable 1 milliGRAM(s) IntraMuscular once  heparin   Injectable 5000 Unit(s) SubCutaneous every 8 hours  insulin lispro (ADMELOG) corrective regimen sliding scale   SubCutaneous every 6 hours  insulin NPH human recombinant 5 Unit(s) SubCutaneous every 6 hours  midodrine 10 milliGRAM(s) Oral every 8 hours  norepinephrine Infusion 0.08 MICROgram(s)/kG/Min (4.99 mL/Hr) IV Continuous <Continuous>  sodium chloride 3%  Inhalation 4 milliLiter(s) Inhalation every 6 hours    MEDICATIONS  (PRN):  acetaminophen   Oral Liquid .. 650 milliGRAM(s) Oral every 6 hours PRN Temp greater or equal to 38C (100.4F), Mild Pain (1 - 3)  dextrose Oral Gel 15 Gram(s) Oral once PRN Blood Glucose LESS THAN 70 milliGRAM(s)/deciliter      ALLERGIES:  Allergies    isoniazid (Rash)  nafcillin (Unknown)  hydrALAZINE (Rash)  vitamin E (Short breath; Urticaria; Hives)  doxycycline (Rash)  cefepime (Rash)  NIFEdipine (Urticaria; Hives)    Intolerances        LABS:                        8.2    13.88 )-----------( 214      ( 15 Aug 2023 12:00 )             25.4     08-15    133<L>  |  97<L>  |  62<H>  ----------------------------<  185<H>  4.1   |  22  |  4.12<H>    Ca    8.7      15 Aug 2023 02:06  Phos  3.8     08-15  Mg     2.80     08-15    TPro  5.7<L>  /  Alb  2.5<L>  /  TBili  <0.2  /  DBili  x   /  AST  18  /  ALT  9   /  AlkPhos  117  08-14      Urinalysis Basic - ( 15 Aug 2023 02:06 )    Color: x / Appearance: x / SG: x / pH: x  Gluc: 185 mg/dL / Ketone: x  / Bili: x / Urobili: x   Blood: x / Protein: x / Nitrite: x   Leuk Esterase: x / RBC: x / WBC x   Sq Epi: x / Non Sq Epi: x / Bacteria: x        RADIOLOGY & ADDITIONAL TESTS: Reviewed.

## 2023-08-15 NOTE — PROGRESS NOTE ADULT - SUBJECTIVE AND OBJECTIVE BOX
Patient is a 73y old  Female who presents with a chief complaint of Respiratory distress (15 Aug 2023 12:58)      SUBJECTIVE / OVERNIGHT EVENTS: ptn is edematous appearing, minimally responsive, has a diffuse al;lergic rash prob 2/2 cefepime, now off ABx, remains on Levophed, intubated, off sedation  at bedside. remains full code    MEDICATIONS  (STANDING):  albuterol/ipratropium for Nebulization 3 milliLiter(s) Nebulizer every 6 hours  aspirin  chewable 81 milliGRAM(s) Oral daily  atorvastatin 40 milliGRAM(s) Oral at bedtime  chlorhexidine 0.12% Liquid 15 milliLiter(s) Oral Mucosa every 12 hours  chlorhexidine 2% Cloths 1 Application(s) Topical daily  dextrose 5%. 1000 milliLiter(s) (100 mL/Hr) IV Continuous <Continuous>  dextrose 5%. 1000 milliLiter(s) (50 mL/Hr) IV Continuous <Continuous>  dextrose 50% Injectable 25 Gram(s) IV Push once  dextrose 50% Injectable 12.5 Gram(s) IV Push once  dextrose 50% Injectable 25 Gram(s) IV Push once  glucagon  Injectable 1 milliGRAM(s) IntraMuscular once  heparin   Injectable 5000 Unit(s) SubCutaneous every 8 hours  insulin lispro (ADMELOG) corrective regimen sliding scale   SubCutaneous every 6 hours  insulin NPH human recombinant 5 Unit(s) SubCutaneous every 6 hours  midodrine 10 milliGRAM(s) Oral every 8 hours  norepinephrine Infusion 0.08 MICROgram(s)/kG/Min (4.99 mL/Hr) IV Continuous <Continuous>  sodium chloride 3%  Inhalation 4 milliLiter(s) Inhalation every 6 hours    MEDICATIONS  (PRN):  acetaminophen   Oral Liquid .. 650 milliGRAM(s) Oral every 6 hours PRN Temp greater or equal to 38C (100.4F), Mild Pain (1 - 3)  dextrose Oral Gel 15 Gram(s) Oral once PRN Blood Glucose LESS THAN 70 milliGRAM(s)/deciliter      Vital Signs Last 24 Hrs  T(F): 100.2 (08-15-23 @ 16:00), Max: 100.2 (08-15-23 @ 00:00)  HR: 85 (08-15-23 @ 19:00) (78 - 99)  BP: --  RR: 12 (08-15-23 @ 19:00) (12 - 28)  SpO2: 99% (08-15-23 @ 19:00) (97% - 100%)  Telemetry:   CAPILLARY BLOOD GLUCOSE      POCT Blood Glucose.: 221 mg/dL (15 Aug 2023 17:28)  POCT Blood Glucose.: 226 mg/dL (15 Aug 2023 12:13)  POCT Blood Glucose.: 197 mg/dL (15 Aug 2023 05:24)  POCT Blood Glucose.: 195 mg/dL (14 Aug 2023 23:35)    I&O's Summary    14 Aug 2023 07:01  -  15 Aug 2023 07:00  --------------------------------------------------------  IN: 1461.5 mL / OUT: 482 mL / NET: 979.5 mL    15 Aug 2023 07:01  -  15 Aug 2023 19:57  --------------------------------------------------------  IN: 360 mL / OUT: 240 mL / NET: 120 mL        PHYSICAL EXAM:  GENERAL: NAD, well-developed  HEAD:  Atraumatic, Normocephalic  EYES: EOMI, PERRLA, conjunctiva and sclera clear  NECK: Supple, No JVD  CHEST/LUNG: Clear to auscultation bilaterally; No wheeze  HEART: Regular rate and rhythm; No murmurs, rubs, or gallops  ABDOMEN: Soft, Nontender, Nondistended; Bowel sounds present  EXTREMITIES:  2+ Peripheral Pulses, No clubbing, cyanosis, or edema  PSYCH: AAOx3  NEUROLOGY: non-focal  SKIN: No rashes or lesions    LABS:                        8.2    13.88 )-----------( 214      ( 15 Aug 2023 12:00 )             25.4     08-15    133<L>  |  97<L>  |  62<H>  ----------------------------<  185<H>  4.1   |  22  |  4.12<H>    Ca    8.7      15 Aug 2023 02:06  Phos  3.8     08-15  Mg     2.80     08-15    TPro  5.7<L>  /  Alb  2.5<L>  /  TBili  <0.2  /  DBili  x   /  AST  18  /  ALT  9   /  AlkPhos  117  08-14          Urinalysis Basic - ( 15 Aug 2023 02:06 )    Color: x / Appearance: x / SG: x / pH: x  Gluc: 185 mg/dL / Ketone: x  / Bili: x / Urobili: x   Blood: x / Protein: x / Nitrite: x   Leuk Esterase: x / RBC: x / WBC x   Sq Epi: x / Non Sq Epi: x / Bacteria: x        RADIOLOGY & ADDITIONAL TESTS:    Imaging Personally Reviewed:    Consultant(s) Notes Reviewed:      Care Discussed with Consultants/Other Providers:

## 2023-08-16 NOTE — PROGRESS NOTE ADULT - PROBLEM SELECTOR PLAN 1
Multifactorial   Aspiration, acute on chronic diastolic CHF   completed  IV abx   Ventilatory and hemodynamic support

## 2023-08-16 NOTE — PROGRESS NOTE ADULT - SUBJECTIVE AND OBJECTIVE BOX
INTERVAL HPI/OVERNIGHT EVENTS:    SUBJECTIVE: Patient seen and examined at bedside.     CONSTITUTIONAL: No weakness, fevers or chills  EYES/ENT: No visual changes;  No vertigo or throat pain   NECK: No pain or stiffness  RESPIRATORY: No cough, wheezing, hemoptysis; No shortness of breath  CARDIOVASCULAR: No chest pain or palpitations  GASTROINTESTINAL: No abdominal or epigastric pain. No nausea, vomiting, or hematemesis; No diarrhea or constipation. No melena or hematochezia.  GENITOURINARY: No dysuria, frequency or hematuria  NEUROLOGICAL: No numbness or weakness  SKIN: No itching, rashes    OBJECTIVE:    VITAL SIGNS:  ICU Vital Signs Last 24 Hrs  T(C): 37.2 (16 Aug 2023 04:00), Max: 38.1 (15 Aug 2023 20:00)  T(F): 99 (16 Aug 2023 04:00), Max: 100.5 (15 Aug 2023 20:00)  HR: 95 (16 Aug 2023 07:00) (79 - 107)  BP: --  BP(mean): --  ABP: 134/37 (16 Aug 2023 07:00) (116/34 - 164/44)  ABP(mean): 72 (16 Aug 2023 07:00) (59 - 91)  RR: 14 (16 Aug 2023 07:00) (12 - 27)  SpO2: 97% (16 Aug 2023 07:00) (95% - 100%)    O2 Parameters below as of 16 Aug 2023 04:00  Patient On (Oxygen Delivery Method): ventilator    O2 Concentration (%): 30      Mode: AC/ CMV (Assist Control/ Continuous Mandatory Ventilation), RR (machine): 14, TV (machine): 360, FiO2: 30, PEEP: 5, ITime: 0.8, MAP: 10, PIP: 32    08-15 @ 07:01  -  08-16 @ 07:00  --------------------------------------------------------  IN: 870 mL / OUT: 390 mL / NET: 480 mL      CAPILLARY BLOOD GLUCOSE      POCT Blood Glucose.: 292 mg/dL (16 Aug 2023 05:12)      PHYSICAL EXAM:    General: NAD  HEENT: NC/AT; PERRL, clear conjunctiva  Neck: supple  Respiratory: CTA b/l  Cardiovascular: +S1/S2; RRR  Abdomen: soft, NT/ND; +BS x4  Extremities: WWP, 2+ peripheral pulses b/l; no LE edema  Skin: normal color and turgor; no rash  Neurological:    MEDICATIONS:  MEDICATIONS  (STANDING):  albuterol/ipratropium for Nebulization 3 milliLiter(s) Nebulizer every 6 hours  aspirin  chewable 81 milliGRAM(s) Oral daily  atorvastatin 40 milliGRAM(s) Oral at bedtime  chlorhexidine 0.12% Liquid 15 milliLiter(s) Oral Mucosa every 12 hours  chlorhexidine 2% Cloths 1 Application(s) Topical daily  dextrose 5%. 1000 milliLiter(s) (100 mL/Hr) IV Continuous <Continuous>  dextrose 5%. 1000 milliLiter(s) (50 mL/Hr) IV Continuous <Continuous>  dextrose 50% Injectable 25 Gram(s) IV Push once  dextrose 50% Injectable 12.5 Gram(s) IV Push once  dextrose 50% Injectable 25 Gram(s) IV Push once  glucagon  Injectable 1 milliGRAM(s) IntraMuscular once  heparin   Injectable 5000 Unit(s) SubCutaneous every 8 hours  insulin lispro (ADMELOG) corrective regimen sliding scale   SubCutaneous every 6 hours  insulin NPH human recombinant 5 Unit(s) SubCutaneous every 6 hours  midodrine 10 milliGRAM(s) Oral every 8 hours  sodium chloride 3%  Inhalation 4 milliLiter(s) Inhalation every 6 hours    MEDICATIONS  (PRN):  acetaminophen   Oral Liquid .. 650 milliGRAM(s) Oral every 6 hours PRN Temp greater or equal to 38C (100.4F), Mild Pain (1 - 3)  dextrose Oral Gel 15 Gram(s) Oral once PRN Blood Glucose LESS THAN 70 milliGRAM(s)/deciliter      ALLERGIES:  Allergies    isoniazid (Rash)  nafcillin (Unknown)  hydrALAZINE (Rash)  vitamin E (Short breath; Urticaria; Hives)  doxycycline (Rash)  cefepime (Rash)  NIFEdipine (Urticaria; Hives)    Intolerances        LABS:                        8.5    16.25 )-----------( 232      ( 16 Aug 2023 00:08 )             26.3     08-16    132<L>  |  95<L>  |  69<H>  ----------------------------<  240<H>  3.7   |  22  |  4.35<H>    Ca    9.5      16 Aug 2023 00:08  Phos  4.0     08-16  Mg     3.10     08-16    TPro  5.7<L>  /  Alb  2.5<L>  /  TBili  <0.2  /  DBili  x   /  AST  18  /  ALT  9   /  AlkPhos  117  08-14      Urinalysis Basic - ( 16 Aug 2023 00:08 )    Color: x / Appearance: x / SG: x / pH: x  Gluc: 240 mg/dL / Ketone: x  / Bili: x / Urobili: x   Blood: x / Protein: x / Nitrite: x   Leuk Esterase: x / RBC: x / WBC x   Sq Epi: x / Non Sq Epi: x / Bacteria: x        RADIOLOGY & ADDITIONAL TESTS: Reviewed. INTERVAL HPI/OVERNIGHT EVENTS:    SUBJECTIVE: Patient seen and examined at bedside. Intubated and off sedation, now waking up and moving arms     OBJECTIVE:    VITAL SIGNS:  ICU Vital Signs Last 24 Hrs  T(C): 37.2 (16 Aug 2023 04:00), Max: 38.1 (15 Aug 2023 20:00)  T(F): 99 (16 Aug 2023 04:00), Max: 100.5 (15 Aug 2023 20:00)  HR: 95 (16 Aug 2023 07:00) (79 - 107)  BP: --  BP(mean): --  ABP: 134/37 (16 Aug 2023 07:00) (116/34 - 164/44)  ABP(mean): 72 (16 Aug 2023 07:00) (59 - 91)  RR: 14 (16 Aug 2023 07:00) (12 - 27)  SpO2: 97% (16 Aug 2023 07:00) (95% - 100%)    O2 Parameters below as of 16 Aug 2023 04:00  Patient On (Oxygen Delivery Method): ventilator    O2 Concentration (%): 30      Mode: AC/ CMV (Assist Control/ Continuous Mandatory Ventilation), RR (machine): 14, TV (machine): 360, FiO2: 30, PEEP: 5, ITime: 0.8, MAP: 10, PIP: 32    08-15 @ 07:01  -  08-16 @ 07:00  --------------------------------------------------------  IN: 870 mL / OUT: 390 mL / NET: 480 mL      CAPILLARY BLOOD GLUCOSE      POCT Blood Glucose.: 292 mg/dL (16 Aug 2023 05:12)      PHYSICAL EXAM:    General: NAD, intubated, opens eyes to command  HEENT: NC/AT; PERRL  Neck: supple  Respiratory: CTA b/l, not triggering vent   Cardiovascular: +S1/S2; RRR  Abdomen: soft, NT/ND; +BS x4  Extremities: WWP, 2+ peripheral pulses b/l; no LE edema  Skin: erythematous rash     MEDICATIONS:  MEDICATIONS  (STANDING):  albuterol/ipratropium for Nebulization 3 milliLiter(s) Nebulizer every 6 hours  aspirin  chewable 81 milliGRAM(s) Oral daily  atorvastatin 40 milliGRAM(s) Oral at bedtime  chlorhexidine 0.12% Liquid 15 milliLiter(s) Oral Mucosa every 12 hours  chlorhexidine 2% Cloths 1 Application(s) Topical daily  dextrose 5%. 1000 milliLiter(s) (100 mL/Hr) IV Continuous <Continuous>  dextrose 5%. 1000 milliLiter(s) (50 mL/Hr) IV Continuous <Continuous>  dextrose 50% Injectable 25 Gram(s) IV Push once  dextrose 50% Injectable 12.5 Gram(s) IV Push once  dextrose 50% Injectable 25 Gram(s) IV Push once  glucagon  Injectable 1 milliGRAM(s) IntraMuscular once  heparin   Injectable 5000 Unit(s) SubCutaneous every 8 hours  insulin lispro (ADMELOG) corrective regimen sliding scale   SubCutaneous every 6 hours  insulin NPH human recombinant 5 Unit(s) SubCutaneous every 6 hours  midodrine 10 milliGRAM(s) Oral every 8 hours  sodium chloride 3%  Inhalation 4 milliLiter(s) Inhalation every 6 hours    MEDICATIONS  (PRN):  acetaminophen   Oral Liquid .. 650 milliGRAM(s) Oral every 6 hours PRN Temp greater or equal to 38C (100.4F), Mild Pain (1 - 3)  dextrose Oral Gel 15 Gram(s) Oral once PRN Blood Glucose LESS THAN 70 milliGRAM(s)/deciliter      ALLERGIES:  Allergies    isoniazid (Rash)  nafcillin (Unknown)  hydrALAZINE (Rash)  vitamin E (Short breath; Urticaria; Hives)  doxycycline (Rash)  cefepime (Rash)  NIFEdipine (Urticaria; Hives)    Intolerances        LABS:                        8.5    16.25 )-----------( 232      ( 16 Aug 2023 00:08 )             26.3     08-16    132<L>  |  95<L>  |  69<H>  ----------------------------<  240<H>  3.7   |  22  |  4.35<H>    Ca    9.5      16 Aug 2023 00:08  Phos  4.0     08-16  Mg     3.10     08-16    TPro  5.7<L>  /  Alb  2.5<L>  /  TBili  <0.2  /  DBili  x   /  AST  18  /  ALT  9   /  AlkPhos  117  08-14      Urinalysis Basic - ( 16 Aug 2023 00:08 )    Color: x / Appearance: x / SG: x / pH: x  Gluc: 240 mg/dL / Ketone: x  / Bili: x / Urobili: x   Blood: x / Protein: x / Nitrite: x   Leuk Esterase: x / RBC: x / WBC x   Sq Epi: x / Non Sq Epi: x / Bacteria: x        RADIOLOGY & ADDITIONAL TESTS: Reviewed.

## 2023-08-16 NOTE — PROGRESS NOTE ADULT - ATTENDING COMMENTS
Patient examined and case reviewed in detail on bedside rounds  Critically ill and unstable on vent with persistent coma/progressive RUSS may need HD   Frequent bedside visits with therapy change today.   I have personally provided 35+ minutes of critical care time concurrently with the resident/fellow; this excludes time spent on separate procedures.

## 2023-08-16 NOTE — PROGRESS NOTE ADULT - SUBJECTIVE AND OBJECTIVE BOX
Patient is a 73y old  Female who presents with a chief complaint of Respiratory distress (16 Aug 2023 14:44)      SUBJECTIVE / OVERNIGHT EVENTS: remains in MICU, intubated, more responsive, moving extremities    MEDICATIONS  (STANDING):  albuterol/ipratropium for Nebulization 3 milliLiter(s) Nebulizer every 6 hours  aspirin  chewable 81 milliGRAM(s) Oral daily  atorvastatin 40 milliGRAM(s) Oral at bedtime  chlorhexidine 0.12% Liquid 15 milliLiter(s) Oral Mucosa every 12 hours  chlorhexidine 2% Cloths 1 Application(s) Topical daily  dextrose 5%. 1000 milliLiter(s) (100 mL/Hr) IV Continuous <Continuous>  dextrose 5%. 1000 milliLiter(s) (50 mL/Hr) IV Continuous <Continuous>  dextrose 50% Injectable 25 Gram(s) IV Push once  dextrose 50% Injectable 12.5 Gram(s) IV Push once  dextrose 50% Injectable 25 Gram(s) IV Push once  glucagon  Injectable 1 milliGRAM(s) IntraMuscular once  heparin   Injectable 5000 Unit(s) SubCutaneous every 8 hours  insulin lispro (ADMELOG) corrective regimen sliding scale   SubCutaneous every 6 hours  insulin NPH human recombinant 5 Unit(s) SubCutaneous every 6 hours  sodium chloride 3%  Inhalation 4 milliLiter(s) Inhalation every 6 hours    MEDICATIONS  (PRN):  acetaminophen   Oral Liquid .. 650 milliGRAM(s) Oral every 6 hours PRN Temp greater or equal to 38C (100.4F), Mild Pain (1 - 3)  dextrose Oral Gel 15 Gram(s) Oral once PRN Blood Glucose LESS THAN 70 milliGRAM(s)/deciliter      Vital Signs Last 24 Hrs  T(F): 99 (08-16-23 @ 16:00), Max: 100.1 (08-16-23 @ 00:00)  HR: 93 (08-16-23 @ 20:00) (74 - 107)  BP: --  RR: 18 (08-16-23 @ 20:00) (14 - 27)  SpO2: 100% (08-16-23 @ 20:00) (95% - 100%)  Telemetry:   CAPILLARY BLOOD GLUCOSE      POCT Blood Glucose.: 256 mg/dL (16 Aug 2023 17:14)  POCT Blood Glucose.: 277 mg/dL (16 Aug 2023 12:22)  POCT Blood Glucose.: 292 mg/dL (16 Aug 2023 05:12)  POCT Blood Glucose.: 232 mg/dL (15 Aug 2023 23:02)    I&O's Summary    15 Aug 2023 07:01  -  16 Aug 2023 07:00  --------------------------------------------------------  IN: 870 mL / OUT: 390 mL / NET: 480 mL    16 Aug 2023 07:01  -  16 Aug 2023 20:41  --------------------------------------------------------  IN: 580 mL / OUT: 132 mL / NET: 448 mL        PHYSICAL EXAM:  GENERAL: NAD, well-developed  HEAD:  Atraumatic, Normocephalic  EYES: EOMI, PERRLA, conjunctiva and sclera clear  NECK: Supple, No JVD  CHEST/LUNG: Clear to auscultation bilaterally; No wheeze  HEART: Regular rate and rhythm; No murmurs, rubs, or gallops  ABDOMEN: Soft, Nontender, Nondistended; Bowel sounds present  EXTREMITIES:  2+ Peripheral Pulses, No clubbing, cyanosis, or edema  PSYCH: AAOx3  NEUROLOGY: non-focal  SKIN: No rashes or lesions    LABS:                        8.5    16.25 )-----------( 232      ( 16 Aug 2023 00:08 )             26.3     08-16    132<L>  |  95<L>  |  69<H>  ----------------------------<  240<H>  3.7   |  22  |  4.35<H>    Ca    9.5      16 Aug 2023 00:08  Phos  4.0     08-16  Mg     3.10     08-16    TPro  5.7<L>  /  Alb  2.5<L>  /  TBili  <0.2  /  DBili  x   /  AST  18  /  ALT  9   /  AlkPhos  117  08-14          Urinalysis Basic - ( 16 Aug 2023 00:08 )    Color: x / Appearance: x / SG: x / pH: x  Gluc: 240 mg/dL / Ketone: x  / Bili: x / Urobili: x   Blood: x / Protein: x / Nitrite: x   Leuk Esterase: x / RBC: x / WBC x   Sq Epi: x / Non Sq Epi: x / Bacteria: x        RADIOLOGY & ADDITIONAL TESTS:    Imaging Personally Reviewed:    Consultant(s) Notes Reviewed:      Care Discussed with Consultants/Other Providers:

## 2023-08-16 NOTE — CHART NOTE - NSCHARTNOTEFT_GEN_A_CORE
: Bertha/Elfego/Jatin    INDICATION: Respiratory Failure    PROCEDURE:  [x] LIMITED ECHO  [x] LIMITED CHEST  [ ] LIMITED RETROPERITONEAL  [ ] LIMITED ABDOMINAL  [ ] LIMITED DVT  [ ] NEEDLE GUIDANCE VASCULAR  [ ] NEEDLE GUIDANCE THORACENTESIS  [ ] NEEDLE GUIDANCE PARACENTESIS  [ ] NEEDLE GUIDANCE PERICARDIOCENTESIS  [ ] OTHER    FINDINGS:     Chest: Focal B lines, however A-Line predominant. Air bronchograms noted at lower left lung field. Right sided pleural effusion noted with additional anechoic spaced noted below diaphragm.      Cardiac: Normal LV function seen in parasternal long and short views.       INTERPRETATION: Findings consistent with right sided pulmonary effusion as well as possible consolidative process at left lower lung. : Bertha/Elfego/Jatin    INDICATION: Respiratory Failure    PROCEDURE:  [x] LIMITED ECHO  [x] LIMITED CHEST  [ ] LIMITED RETROPERITONEAL  [ ] LIMITED ABDOMINAL  [ ] LIMITED DVT  [ ] NEEDLE GUIDANCE VASCULAR  [ ] NEEDLE GUIDANCE THORACENTESIS  [ ] NEEDLE GUIDANCE PARACENTESIS  [ ] NEEDLE GUIDANCE PERICARDIOCENTESIS  [ ] OTHER    FINDINGS:     Chest: Focal B lines, however A-Line predominant.  Right sided pleural effusion    Cardiac: Normal LV function seen in parasternal long and short views.       INTERPRETATION: Findings consistent with right sided pulmonary effusion No cardiac limitation    I was present during the key portions of the procedure and immediately available during the entire procedure.  Jatin TITUS  Attending

## 2023-08-16 NOTE — PROGRESS NOTE ADULT - ASSESSMENT
74 y/o F well known to me from my [facility] [place] practice. she was admitted at [facility] 7/12-7/22 w aspiration PNA, was treated w CEFEPIME, developed an allergic rash,  dCHF, + MAC on AFB culture, had been progressively getting more and more lethargic and dyspneic at home since DC.   In  am of 8/11/23  ptn presented with respiratory distress w hypoxia and hypercarbia requiring intubation 2/2 volume overload +/- Asp PNA      Neuro   off sedation  well known to Dr. Bianchi, consult reviewed   Baseline MS AOx3, aphasic   - h/o CVA , on aspirin and statin . resumed w feeding tube  - eeg  2/2 tremors, no sz focus  - starting to become more responsive    Cardiac   Ptn well known to Dr. Dunn, consult reviewed   CHFpEF   TTE 7/2023 with EF 59%, with severe LVH and diastolic dysfunction   pro-BNP > 94933, trop 78       Pulmonary   Acute hypercapnic and hypoxemic respiratory failure   well known to Dr. Mcnulyt, consult reviewed   DDx: aspiration vs volume overload   VBG with respiratory acidosis pCO2 111  CT chest: mod ( worsening) R pl effusion  - cefepime DCed on 8/13, started on Moxifloxacin on 8/13, on 8/14 off ABx    Renal   Hyperkalemia with EKG changes  , initially treated w LOKELMA,  now resolved   Ptn well know to Dr. Colon, consult reviewed   7.2, hemolyzed, given insulin and D50 + calcium gluc    oliguric RUSS 2/2 ATN, now on BUMEX drip, no neew for HD.       GI  NPO for now, on tube feeds  would start GI ppx w IV PPI    Endocrine  T2DM   A1C 7.1 in 7/2023   - BG goal 140-200     ID   No fever/leukocytosis, recheck temp   Hx latent TB which was treated, no concern for TB   - grew MAC on AFB culture from most recent admission. would call ID consult  Started on empiric vancomycin and aztreonam,  changed to cefepime 8/12, cefepime dced on 8/13(cefepime/zosyn allergy.  developed rash when on cefepime on previous admission ) . started on AVALOX on 8/13, off ABx on 8/14. ptn has an allergic rash, prob 2/2 cefepime  - f/u MRSA   - all cxs NTD    Heme/Onc  ppx: heparin q8 hrs     Ethics  GOC - Discussed GOC with daughter and , they have opted in the past for full code. and she remains full code at present

## 2023-08-16 NOTE — PROGRESS NOTE ADULT - ASSESSMENT
72 y/o F DM on insulin, HTN, CKD,breast CA, respiratory arrest and cardiac arrest (2018), CVA with residual weakness, aspiration PNA h/o trache, PEG, both removed presents with respiratory distress requiring intubation. Had recent admission d/c 7/22/23 for cough and respiratory distress (no intubation) thought to be i/s/o aspiration PNA s/p cefepime course; developed rash in response. Infectious w/u was negative at this time. Was discharged home, daughter and  at bedside providing history. Patient admitted to MICU for higher level of care, intubated & sedated on fentanyl & propofol. While in the MICU, patient was observed to have RUE shaking. Ativan given & EEG started, pending final report. Neurology consulted for further recommendations. Patient unable to offer history at this time. At bedside, daughter &  offer collateral. As per daughter, patient follows with Dr. Garner for stroke management since 2018 and Dr. Poon for tremors. Patient's tremors usually consist of RUE or head shaking, can be stopped by the patient with effort; this has been ongoing for 2-3 years. However, as of the past 2-3 weeks, patient has been experiencing more frequent, more severe RUE shaking intermittently; during these episodes patient is awake and alert. Does not take medications for tremors. No hx of vocal tremors. No hx of seizure, incontinence, tongue bite. For memory loss, patient has been taking Donepezil. Family inquiring about taking alternative supplements instead of Donepezil. At baseline patient wheelchair/bedbound and requires assistance with ADLs. CTH obtained 8/11, no interval change noted. EEG w/o evidence of seizures. Concern for dysconjugate gaze and roving eye movements 8/15, repeat CTH unchanged but unable to visualize brainstem well. Leukocytosis increasing but exam slightly improved today     Impression:   1) Worsening RUE shaking of unclear etiology at this time; no evidence of seizures, resolved   2) AMS possibly related to TME, no sz on EEG. Still with slight dysconjugate gaze. CTH unchanged although not able to visualize brainstem well. Can consider uremic encephaloapthy/ infectious encephalopathy given inc WBC although MS slightly improved today   3) L PCA stroke, ESUS s/p ILR, also with bilateral centrum semiovale watershed infarcts   4) Dementia     Recommendations     [] Infectious w/u  [] MRI brain if feasible to visualize brainstem   [] C/w home ASA 81 mg if no contraindications   [] C/w home Atorvastatin 10 mg qhs   [] DVT/GI ppx  [] Neurochecks q4hr   [] BP goals: Normotension   [] PT/OT when able   [] Diet: NPO w/ tube feeds    [] HgA1C goals < 7.0     D/w daughter at bedside     Katty Charles DO  Vascular Neurology  Office [phone]

## 2023-08-16 NOTE — PROGRESS NOTE ADULT - SUBJECTIVE AND OBJECTIVE BOX
Subjective: Patient seen and examined. No new events except as noted.   remains in ICU     REVIEW OF SYSTEMS:  Unable to obtain       MEDICATIONS:  MEDICATIONS  (STANDING):  albuterol/ipratropium for Nebulization 3 milliLiter(s) Nebulizer every 6 hours  aspirin  chewable 81 milliGRAM(s) Oral daily  atorvastatin 40 milliGRAM(s) Oral at bedtime  chlorhexidine 0.12% Liquid 15 milliLiter(s) Oral Mucosa every 12 hours  chlorhexidine 2% Cloths 1 Application(s) Topical daily  dextrose 5%. 1000 milliLiter(s) (100 mL/Hr) IV Continuous <Continuous>  dextrose 5%. 1000 milliLiter(s) (50 mL/Hr) IV Continuous <Continuous>  dextrose 50% Injectable 25 Gram(s) IV Push once  dextrose 50% Injectable 12.5 Gram(s) IV Push once  dextrose 50% Injectable 25 Gram(s) IV Push once  glucagon  Injectable 1 milliGRAM(s) IntraMuscular once  heparin   Injectable 5000 Unit(s) SubCutaneous every 8 hours  insulin lispro (ADMELOG) corrective regimen sliding scale   SubCutaneous every 6 hours  insulin NPH human recombinant 5 Unit(s) SubCutaneous every 6 hours  midodrine 10 milliGRAM(s) Oral every 8 hours  sodium chloride 3%  Inhalation 4 milliLiter(s) Inhalation every 6 hours      PHYSICAL EXAM:  T(C): 37.3 (08-16-23 @ 08:00), Max: 38.1 (08-15-23 @ 20:00)  HR: 88 (08-16-23 @ 08:00) (79 - 107)  BP: --  RR: 14 (08-16-23 @ 08:00) (12 - 27)  SpO2: 97% (08-16-23 @ 08:00) (95% - 100%)  Wt(kg): --  I&O's Summary    15 Aug 2023 07:01  -  16 Aug 2023 07:00  --------------------------------------------------------  IN: 870 mL / OUT: 390 mL / NET: 480 mL                Appearance: NAD, intubated, sedated	  HEENT: dry oral mucosa, L IJ AMBROCIO   Lymphatic: No lymphadenopathy  Cardiovascular: Normal S1 S2, No JVD, No murmurs, No edema  Respiratory:  Decreased BS, intubated on ventilator	  Psychiatry: A & O x 0, sedated  Gastrointestinal: Soft, Non-tender, + BS, + OGT	  Skin: No rashes, No ecchymoses, No cyanosis	  Extremities: No strength/ROM 2/2 sedation, + BL LE edema  Vascular: Peripheral pulses palpable 2+ bilaterally  +willard    Mode: AC/ CMV (Assist Control/ Continuous Mandatory Ventilation), RR (machine): 14, TV (machine): 360, FiO2: 30, PEEP: 5, ITime: 0.8, MAP: 10, PIP: 32    LABS:    CARDIAC MARKERS:      Blood Gas Profile - Arterial (08.16.23 @ 00:08)   pH, Arterial: 7.30  pCO2, Arterial: 50 mmHg  pO2, Arterial: 82 mmHg  HCO3, Arterial: 25 mmol/L  Base Excess, Arterial: -1.9 mmol/L  Oxygen Saturation, Arterial: 98.7 %  Total CO2, Arterial: 26 mmol/L  FIO2, Arterial: 30  Blood Gas Source Arterial: ArterialBlood Gas Arterial - Calcium, Ionized: 1.31 mmol/L (08.16.23 @ 00:08)                           8.5    16.25 )-----------( 232      ( 16 Aug 2023 00:08 )             26.3     08-16    132<L>  |  95<L>  |  69<H>  ----------------------------<  240<H>  3.7   |  22  |  4.35<H>    Ca    9.5      16 Aug 2023 00:08  Phos  4.0     08-16  Mg     3.10     08-16    TPro  5.7<L>  /  Alb  2.5<L>  /  TBili  <0.2  /  DBili  x   /  AST  18  /  ALT  9   /  AlkPhos  117  08-14    proBNP:   Lipid Profile:   HgA1c:   TSH:     Negative          TELEMETRY: 	    ECG:  	  RADIOLOGY:   DIAGNOSTIC TESTING:  [ ] Echocardiogram:  [ ]  Catheterization:  [ ] Stress Test:    OTHER:

## 2023-08-16 NOTE — PROGRESS NOTE ADULT - ASSESSMENT
74 y/o F DM on insulin, HTN, CKD, CHFpEF, breast CA, respiratory arrest and cardiac arrest (2018), CVA with residual weakness, aspiration PNA h/o trache, PEG, both removed presents with respiratory distress requiring intubation. May be volume overload i/s/o CHF/CKD vs decrease respiratory drive (given oxygen at home). Worsening mental status off sedation, will further work up with imaging.     Neuro   #Metabolic encephalopathy   Baseline MS AOx3   CT head without acute change  Previously on prop and fentanyl which were weaned, now MS slightly improved - following commands and slightly responsive to pain, still not triggering vent   Spoke with neurology, not inclined to get LP   EEG without epileptiform activity   Appears to have disconjugate eyes   - CT head ordered   - MRI brain also ordered to visualize brainstem     #CVA   - c/w aspirin and statin     Cardiac   #Shock  - Likely mixed septic and vasoplegic from prop  - weaned vaso and levo, now HDS  - c/w midodrine 10mg q8hr for further weaning  - holding home coreg 37.5 BID    #CHFpEF   TTE 7/2023 with EF 59%, with severe LVH and diastolic dysfunction   pro-BNP > 35936, trop 78   Repeat TTE with EF 60% normal systolic and grade 1 diastolic dysfunction   - holding home coreg 37.5 BID     Pulmonary   #Acute hypercapnic and hypoxemic respiratory failure   DDx: aspiration vs volume overload vs sedating medication decreasing drive (was given nyquil)   VBG with respiratory acidosis pCO2 111  CXR with small right pleural effusion, no consolidations/opacities  - c/w mechanical ventilation   - monitor blood gas while on vent      /Renal   #RUSS on CKD   Ddx: obstructive (willard in, no hydro on POCUS) vs low flow state vs AIN i/s/o cephalosporin use and drug rash   - Urinalysis with 7 WBC, not pursing steroids at this time per nephro  - Rising creatinine, likely ATN   - Continue to monitor, dose meds per eGFR. avoid nephrotoxic agents  - No acute HD needs at this time    #Hyperkalemia with EKG changes    7.2, hemolyzed, given insulin and D50 + calcium gluc   Likely i/s/o renal dysfunction   Received lasix 80 and 40 IV initially but without adequate response   Given bumex 2mg the morning of 8/12  Per nephro, started on bumex gtt with worsening creatinine   - decreasing urine output, now oliguric   - discontinued bumex gtt       GI  Diet: NPO with tube feeds     Endocrine  #T2DM   A1C 7.1 in 7/2023   - c/w NPH to 5U q6  - BG goal 140-200     ID   No fever/leukocytosis, recheck temp   Hx latent TB which was treated, no concern for TB   Started on empiric vancomycin and aztreonam (cefepime/zosyn allergy? developed rash req prednisone)   Trialed cefepime --> cefazolin (sputum MSSA), developed drug eruption   - Blood and urine cultures demonstrating no growth currently  - monitor off antibx if afebrile     Heme/Onc  ppx: heparin q8 hrs     Ethics  GOC - Per previous GOC with daughter and , reported that they have not talked about end of life care with the patient. For now, they wanted "everything to be done" including CPR, continuing with mech ventilation/intubation, pressors   72 y/o F DM on insulin, HTN, CKD, CHFpEF, breast CA, respiratory arrest and cardiac arrest (2018), CVA with residual weakness, aspiration PNA h/o trache, PEG, both removed presents with respiratory distress requiring intubation. May be volume overload i/s/o CHF/CKD vs decrease respiratory drive (given oxygen at home). Worsening mental status off sedation, will further work up with imaging.     Neuro   #AMS  #Metabolic encephalopathy   Baseline MS AOx3   CT head without acute change  Previously on prop and fentanyl which were weaned, now MS slightly improved - following commands and slightly responsive to pain, still not triggering vent   Spoke with neurology, not inclined to get LP   EEG without epileptiform activity   Appears to have disconjugate eyes, CT head without acute changes   - MRI brain also ordered to visualize brainstem     #CVA   - c/w aspirin and statin     Cardiac   #Shock  - Likely mixed septic and vasoplegic from prop  - weaned vaso and levo, now HDS  - d/c midodrine for hypertension  - can start home coreg 37.5 BID if still HTN    #CHFpEF   TTE 7/2023 with EF 59%, with severe LVH and diastolic dysfunction   pro-BNP > 93353, trop 78   Repeat TTE with EF 60% normal systolic and grade 1 diastolic dysfunction   - holding home coreg 37.5 BID     Pulmonary   #Acute hypercapnic and hypoxemic respiratory failure   DDx: aspiration vs volume overload vs sedating medication decreasing drive (was given nyquil)   VBG with respiratory acidosis pCO2 111  CXR with small right pleural effusion, no consolidations/opacities  - c/w mechanical ventilation   - monitor blood gas while on vent      /Renal   #RUSS on CKD   Ddx: obstructive (willard in, no hydro on POCUS) vs low flow state vs AIN i/s/o cephalosporin use and drug rash   Urinalysis with 7 WBC, not pursing steroids at this time per nephro  Worsening creatinine still but no urgent indication for HD, ATN   - trialed diuretics without improvement   - Continue to monitor, dose meds per eGFR. avoid nephrotoxic agents    #Hyperkalemia with EKG changes    7.2, hemolyzed, given insulin and D50 + calcium gluc   Likely i/s/o renal dysfunction   Received lasix 80 and 40 IV initially but without adequate response   Given bumex 2mg the morning of 8/12  Per nephro, started on bumex gtt with worsening creatinine   - decreasing urine output, now oliguric   - off bumex gtt       GI  Diet: NPO with tube feeds     Endocrine  #T2DM   A1C 7.1 in 7/2023   - c/w NPH to 5U q6  - BG goal 140-200     ID   No fever/leukocytosis, recheck temp   Hx latent TB which was treated, no concern for TB   Started on empiric vancomycin and aztreonam (cefepime/zosyn allergy? developed rash req prednisone)   Trialed cefepime --> cefazolin (sputum MSSA), developed drug eruption - discontinued   Blood and urine cultures 8/11 demonstrating no growth currently  Rising leukocytosis with low grade temp, pt with A-line concern for CLABSI?   - start empiric antibx if febrile > 101  - pending repeat BC and UC    Heme/Onc  ppx: heparin q8 hrs     Ethics  GOC - Per previous GOC with daughter and , reported that they have not talked about end of life care with the patient. For now, they wanted "everything to be done" including CPR, continuing with mech ventilation/intubation, pressors

## 2023-08-16 NOTE — PROGRESS NOTE ADULT - SUBJECTIVE AND OBJECTIVE BOX
Date of Service: 08-16-23 @ 11:53    Patient is a 73y old  Female who presents with a chief complaint of Respiratory distress (16 Aug 2023 09:45)      Any change in ROS: Still  intubated: lethargic still: daughter is at bedside:     MEDICATIONS  (STANDING):  albuterol/ipratropium for Nebulization 3 milliLiter(s) Nebulizer every 6 hours  aspirin  chewable 81 milliGRAM(s) Oral daily  atorvastatin 40 milliGRAM(s) Oral at bedtime  chlorhexidine 0.12% Liquid 15 milliLiter(s) Oral Mucosa every 12 hours  chlorhexidine 2% Cloths 1 Application(s) Topical daily  dextrose 5%. 1000 milliLiter(s) (50 mL/Hr) IV Continuous <Continuous>  dextrose 5%. 1000 milliLiter(s) (100 mL/Hr) IV Continuous <Continuous>  dextrose 50% Injectable 25 Gram(s) IV Push once  dextrose 50% Injectable 12.5 Gram(s) IV Push once  dextrose 50% Injectable 25 Gram(s) IV Push once  glucagon  Injectable 1 milliGRAM(s) IntraMuscular once  heparin   Injectable 5000 Unit(s) SubCutaneous every 8 hours  insulin lispro (ADMELOG) corrective regimen sliding scale   SubCutaneous every 6 hours  insulin NPH human recombinant 5 Unit(s) SubCutaneous every 6 hours  sodium chloride 3%  Inhalation 4 milliLiter(s) Inhalation every 6 hours    MEDICATIONS  (PRN):  acetaminophen   Oral Liquid .. 650 milliGRAM(s) Oral every 6 hours PRN Temp greater or equal to 38C (100.4F), Mild Pain (1 - 3)  dextrose Oral Gel 15 Gram(s) Oral once PRN Blood Glucose LESS THAN 70 milliGRAM(s)/deciliter    Vital Signs Last 24 Hrs  T(C): 37.3 (16 Aug 2023 08:00), Max: 38.1 (15 Aug 2023 20:00)  T(F): 99.1 (16 Aug 2023 08:00), Max: 100.5 (15 Aug 2023 20:00)  HR: 83 (16 Aug 2023 11:00) (74 - 107)  BP: --  BP(mean): --  RR: 15 (16 Aug 2023 11:00) (12 - 27)  SpO2: 99% (16 Aug 2023 11:00) (95% - 100%)    Parameters below as of 16 Aug 2023 11:00  Patient On (Oxygen Delivery Method): ventilator    O2 Concentration (%): 30  Mode: AC/ CMV (Assist Control/ Continuous Mandatory Ventilation)  RR (machine): 14  TV (machine): 360  FiO2: 30  PEEP: 5  MAP: 10  PIP: 35    I&O's Summary    15 Aug 2023 07:01  -  16 Aug 2023 07:00  --------------------------------------------------------  IN: 870 mL / OUT: 390 mL / NET: 480 mL    16 Aug 2023 07:01  -  16 Aug 2023 11:53  --------------------------------------------------------  IN: 160 mL / OUT: 62 mL / NET: 98 mL          Physical Exam:   GENERAL: NAD, well-groomed, well-developed  HEENT: MANDY/   Atraumatic, Normocephalic  ENMT: No tonsillar erythema, exudates, or enlargement; Moist mucous membranes, Good dentition, No lesions  NECK: Supple, No JVD, Normal thyroid  CHEST/LUNG: Clear to auscultaion- no wheezing  CVS: Regular rate and rhythm; No murmurs, rubs, or gallops  GI: : Soft, Nontender, Nondistended; Bowel sounds present  NERVOUS SYSTEM:  lethargic : intubated  EXTREMITIES:  rt ue edema  LYMPH: No lymphadenopathy noted  SKIN: No rashes or lesions  ENDOCRINOLOGY: No Thyromegaly  PSYCH: letahrgic    Labs:  ABG - ( 16 Aug 2023 00:08 )  pH, Arterial: 7.30  pH, Blood: x     /  pCO2: 50    /  pO2: 82    / HCO3: 25    / Base Excess: -1.9  /  SaO2: 98.7            28, 29, 37                            8.5    16.25 )-----------( 232      ( 16 Aug 2023 00:08 )             26.3                         8.2    13.88 )-----------( 214      ( 15 Aug 2023 12:00 )             25.4                         6.5    11.69 )-----------( 196      ( 15 Aug 2023 02:06 )             20.9                         7.3    12.99 )-----------( 202      ( 14 Aug 2023 01:10 )             23.3                         8.1    14.11 )-----------( 280      ( 13 Aug 2023 11:10 )             26.7                         7.7    8.56  )-----------( 240      ( 13 Aug 2023 01:44 )             25.2     08-16    132<L>  |  95<L>  |  69<H>  ----------------------------<  240<H>  3.7   |  22  |  4.35<H>  08-15    133<L>  |  97<L>  |  62<H>  ----------------------------<  185<H>  4.1   |  22  |  4.12<H>  08-14    133<L>  |  96<L>  |  60<H>  ----------------------------<  174<H>  4.2   |  23  |  4.07<H>  08-14    132<L>  |  96<L>  |  56<H>  ----------------------------<  289<H>  4.3   |  22  |  3.79<H>  08-13    132<L>  |  98  |  56<H>  ----------------------------<  256<H>  4.5   |  23  |  3.64<H>  08-13    134<L>  |  99  |  56<H>  ----------------------------<  257<H>  4.7   |  24  |  3.72<H>  08-13    135  |  98  |  53<H>  ----------------------------<  271<H>  5.0   |  25  |  3.50<H>    Ca    9.5      16 Aug 2023 00:08  Ca    8.7      15 Aug 2023 02:06  Ca    8.5      14 Aug 2023 20:50  Phos  4.0     08-16  Phos  3.8     08-15  Phos  4.4     08-14  Mg     3.10     08-16  Mg     2.80     08-15  Mg     2.80     08-14    TPro  5.7<L>  /  Alb  2.5<L>  /  TBili  <0.2  /  DBili  x   /  AST  18  /  ALT  9   /  AlkPhos  117  08-14  TPro  5.3<L>  /  Alb  1.8<L>  /  TBili  <0.2  /  DBili  x   /  AST  11  /  ALT  9   /  AlkPhos  124<H>  08-14  TPro  5.5<L>  /  Alb  1.8<L>  /  TBili  <0.2  /  DBili  x   /  AST  14  /  ALT  12  /  AlkPhos  115  08-13    CAPILLARY BLOOD GLUCOSE      POCT Blood Glucose.: 292 mg/dL (16 Aug 2023 05:12)  POCT Blood Glucose.: 232 mg/dL (15 Aug 2023 23:02)  POCT Blood Glucose.: 221 mg/dL (15 Aug 2023 17:28)  POCT Blood Glucose.: 226 mg/dL (15 Aug 2023 12:13)      LIVER FUNCTIONS - ( 14 Aug 2023 20:50 )  Alb: 2.5 g/dL / Pro: 5.7 g/dL / ALK PHOS: 117 U/L / ALT: 9 U/L / AST: 18 U/L / GGT: x             Urinalysis Basic - ( 16 Aug 2023 00:08 )    Color: x / Appearance: x / SG: x / pH: x  Gluc: 240 mg/dL / Ketone: x  / Bili: x / Urobili: x   Blood: x / Protein: x / Nitrite: x   Leuk Esterase: x / RBC: x / WBC x   Sq Epi: x / Non Sq Epi: x / Bacteria: x            RECENT CULTURES:  08-11 @ 17:50 .Sputum Sputum   SIMON    Rare polymorphonuclear leukocytes per low power field  Rare Squamous epithelial cells per low power field  Numerous Gram positive cocci in pairs per oil power field    Staphylococcus aureus  Staphylococcus aureus     Numerous Staphylococcus aureus    08-11 @ 12:19 Clean Catch Clean Catch (Midstream)                No growth    08-11 @ 08:11 .Blood Blood-Peripheral            rad< from: Xray Chest 1 View- PORTABLE-Urgent (Xray Chest 1 View- PORTABLE-Urgent .) (08.13.23 @ 07:09) >  TIME OF EXAMINATION: August 13 at 6:59 AM    EXAM: Portablechest    FINDINGS:  Since the last study, a left IJ line has been placed and its tip is at   the origin of the SVC. No complicating pneumothorax.    Endotracheal tube with tip just above the ines and enteric tube in   place.    Vascular congestion with right pleural thickening unchanged.    Heart is not enlarged with a loop recorder. No effusions visualized.    < end of copied text >      No growth at 4 days          RESPIRATORY CULTURES:          Studies  Chest X-RAY  CT SCAN Chest   Venous Dopplers: LE:   CT Abdomen  Others

## 2023-08-16 NOTE — PROGRESS NOTE ADULT - SUBJECTIVE AND OBJECTIVE BOX
Overnight events noted  Remains off vent; minimally communicative off sedation  Off pressor gtts/on enteral Midodrine    VITAL:  T(C): , Max: 38.1 (08-15-23 @ 20:00)  T(F): , Max: 100.5 (08-15-23 @ 20:00)  HR: 88 (08-16-23 @ 08:00)  BP: 131/35  RR: 14 (08-16-23 @ 08:00)  SpO2: 97% (08-16-23 @ 08:00)  urine output 390cc/24h      PHYSICAL EXAM:  Constitutional: intubated/minimally communicative off sedation  HEENT: (+)ETT/OGT  Neck: Supple, No JVD  Respiratory: coarse BS b/l  Cardiovascular: RRR s1s2, no m/r/g  Gastrointestinal: BS+, soft, NT/ND  GI: (+)willard  Extremities: No peripheral edema b/l  Back: no CVAT b/l  Skin: No rashes, no nevi    LABS:                        8.5    16.25 )-----------( 232      ( 16 Aug 2023 00:08 )             26.3     Na(132)/K(3.7)/Cl(95)/HCO3(22)/BUN(69)/Cr(4.35)Glu(240)/Ca(9.5)/Mg(3.10)/PO4(4.0)    08-16 @ 00:08  Na(133)/K(4.1)/Cl(97)/HCO3(22)/BUN(62)/Cr(4.12)Glu(185)/Ca(8.7)/Mg(2.80)/PO4(3.8)    08-15 @ 02:06  Na(133)/K(4.2)/Cl(96)/HCO3(23)/BUN(60)/Cr(4.07)Glu(174)/Ca(8.5)/Mg(2.80)/PO4(4.4)    08-14 @ 20:50  Na(132)/K(4.3)/Cl(96)/HCO3(22)/BUN(56)/Cr(3.79)Glu(289)/Ca(8.6)/Mg(2.70)/PO4(4.2)    08-14 @ 01:10  Na(132)/K(4.5)/Cl(98)/HCO3(23)/BUN(56)/Cr(3.64)Glu(256)/Ca(8.2)/Mg(--)/PO4(--)    08-13 @ 19:14  Na(134)/K(4.7)/Cl(99)/HCO3(24)/BUN(56)/Cr(3.72)Glu(257)/Ca(8.3)/Mg(--)/PO4(--)    08-13 @ 11:10      IMAGING:  < from: CT Head No Cont (08.15.23 @ 17:20) >  Parenchymal volume loss and chronic microvessel ischemic changes are again seen.  Abnormal low-attenuation involving the left occipital cortical   subcortical region is again seen. This is compatible with old left PCA infarct.  No evidence of acute hemorrhage mass or mass effect is seen  Evaluation of the osseous structures with appropriate window demonstrates   sclerotic changes about the left mastoid region which appears stable.   Opacification left middle ear region is again seen.  Patient is status post bilateral cataract surgery.  Impression: Stable exam.        IMPRESSION: 73F w/ HTN, DM2, CVA, breast CA-bilateral mastectomy, recurrent aspiration pneumonia/respiratory failure, and CKD, 8/11/23 p/w acute hypercapnic respiratory failure; c/b RUSS    (1)Renal - CKD4     (2)RUSS - ischemic ATN; doubt component of AIN here; risk>benefit of high-dose steroids here, in my opinion.     (3)Lytes - grossly acceptable    (4)Pulm- hypercapnic respiratory failure on admission - remains intubated    (5)ID - PNA - s/p IV abx    (6)CV - off pressor drips/on Midodrine    (7)Neuro - minimally communicative off sedation; undergoing workup for encephalopathy. Highly unlikely that patient's AMS is from uremia    (8)GO - full code      RECOMMEND:  (1)Midodrine as ordered  (2)Workup of AMS per MICU/Neurology  (3)Dose new meds for GFR <15 (present dosing is acceptable)  (4)BMP+MG+PO4 daily  (5)No HD for now            Jimmy Colon MD  [facility]  Office/on call physician: [phone]  Cell (7a-7p): [phone]       Overnight events noted  Remains on vent/off sedation - overbreathing vent/reacting no tactile/noxious stimuli  Off pressor gtts/on enteral Midodrine        VITAL:  T(C): , Max: 38.1 (08-15-23 @ 20:00)  T(F): , Max: 100.5 (08-15-23 @ 20:00)  HR: 88 (08-16-23 @ 08:00)  BP: 131/35  RR: 14 (08-16-23 @ 08:00)  SpO2: 97% (08-16-23 @ 08:00)  urine output 390cc/24h      PHYSICAL EXAM:  Constitutional: intubated/minimally communicative  HEENT: (+)ETT/OGT  Neck: Supple, No JVD  Respiratory: coarse BS b/l  Cardiovascular: RRR s1s2, no m/r/g  Gastrointestinal: BS+, soft, NT/ND  GI: (+)willard  Extremities: No peripheral edema b/l  Back: no CVAT b/l  Skin: No rashes, no nevi    LABS:                        8.5    16.25 )-----------( 232      ( 16 Aug 2023 00:08 )             26.3     Na(132)/K(3.7)/Cl(95)/HCO3(22)/BUN(69)/Cr(4.35)Glu(240)/Ca(9.5)/Mg(3.10)/PO4(4.0)    08-16 @ 00:08  Na(133)/K(4.1)/Cl(97)/HCO3(22)/BUN(62)/Cr(4.12)Glu(185)/Ca(8.7)/Mg(2.80)/PO4(3.8)    08-15 @ 02:06  Na(133)/K(4.2)/Cl(96)/HCO3(23)/BUN(60)/Cr(4.07)Glu(174)/Ca(8.5)/Mg(2.80)/PO4(4.4)    08-14 @ 20:50  Na(132)/K(4.3)/Cl(96)/HCO3(22)/BUN(56)/Cr(3.79)Glu(289)/Ca(8.6)/Mg(2.70)/PO4(4.2)    08-14 @ 01:10  Na(132)/K(4.5)/Cl(98)/HCO3(23)/BUN(56)/Cr(3.64)Glu(256)/Ca(8.2)/Mg(--)/PO4(--)    08-13 @ 19:14  Na(134)/K(4.7)/Cl(99)/HCO3(24)/BUN(56)/Cr(3.72)Glu(257)/Ca(8.3)/Mg(--)/PO4(--)    08-13 @ 11:10      IMAGING:  < from: CT Head No Cont (08.15.23 @ 17:20) >  Parenchymal volume loss and chronic microvessel ischemic changes are again seen.  Abnormal low-attenuation involving the left occipital cortical   subcortical region is again seen. This is compatible with old left PCA infarct.  No evidence of acute hemorrhage mass or mass effect is seen  Evaluation of the osseous structures with appropriate window demonstrates   sclerotic changes about the left mastoid region which appears stable.   Opacification left middle ear region is again seen.  Patient is status post bilateral cataract surgery.  Impression: Stable exam.        IMPRESSION: 73F w/ HTN, DM2, CVA, breast CA-bilateral mastectomy, recurrent aspiration pneumonia/respiratory failure, and CKD, 8/11/23 p/w acute hypercapnic respiratory failure; c/b RUSS    (1)Renal - CKD4     (2)RUSS - ischemic ATN; doubt component of AIN here; risk>benefit of high-dose steroids here, in my opinion.     (3)Lytes - grossly acceptable    (4)Pulm- hypercapnic respiratory failure on admission - remains intubated    (5)ID - PNA - s/p IV abx    (6)CV - off pressor drips/on Midodrine    (7)Neuro - starting to regain mentation - likely that her sedation is remaining longer in her system than the average patient, in setting of renal impairment    (8)GOC - full code      RECOMMEND:  (1)Midodrine as ordered  (2)Dose new meds for GFR <15 (present dosing is acceptable)  (3)BMP+MG+PO4 daily  (4)No HD for now            Jimmy Colon MD  [facility]  Office/on call physician: [phone]  Cell (7a-7p): [phone]

## 2023-08-16 NOTE — PROGRESS NOTE ADULT - SUBJECTIVE AND OBJECTIVE BOX
Neurology Progress Note    S: Patient seen and examined. WBC increased. CTH unchanged      Medications: MEDICATIONS  (STANDING):  albuterol/ipratropium for Nebulization 3 milliLiter(s) Nebulizer every 6 hours  aspirin  chewable 81 milliGRAM(s) Oral daily  atorvastatin 40 milliGRAM(s) Oral at bedtime  chlorhexidine 0.12% Liquid 15 milliLiter(s) Oral Mucosa every 12 hours  chlorhexidine 2% Cloths 1 Application(s) Topical daily  dextrose 5%. 1000 milliLiter(s) (50 mL/Hr) IV Continuous <Continuous>  dextrose 5%. 1000 milliLiter(s) (100 mL/Hr) IV Continuous <Continuous>  dextrose 50% Injectable 25 Gram(s) IV Push once  dextrose 50% Injectable 12.5 Gram(s) IV Push once  dextrose 50% Injectable 25 Gram(s) IV Push once  glucagon  Injectable 1 milliGRAM(s) IntraMuscular once  heparin   Injectable 5000 Unit(s) SubCutaneous every 8 hours  insulin lispro (ADMELOG) corrective regimen sliding scale   SubCutaneous every 6 hours  insulin NPH human recombinant 5 Unit(s) SubCutaneous every 6 hours  sodium chloride 3%  Inhalation 4 milliLiter(s) Inhalation every 6 hours    MEDICATIONS  (PRN):  acetaminophen   Oral Liquid .. 650 milliGRAM(s) Oral every 6 hours PRN Temp greater or equal to 38C (100.4F), Mild Pain (1 - 3)  dextrose Oral Gel 15 Gram(s) Oral once PRN Blood Glucose LESS THAN 70 milliGRAM(s)/deciliter       Vitals:  Vital Signs Last 24 Hrs  T(C): 36.8 (16 Aug 2023 12:00), Max: 38.1 (15 Aug 2023 20:00)  T(F): 98.3 (16 Aug 2023 12:00), Max: 100.5 (15 Aug 2023 20:00)  HR: 85 (16 Aug 2023 13:00) (74 - 107)  BP: --  BP(mean): --  RR: 14 (16 Aug 2023 13:00) (12 - 27)  SpO2: 98% (16 Aug 2023 13:00) (95% - 100%)    Parameters below as of 16 Aug 2023 13:00  Patient On (Oxygen Delivery Method): ventilator    O2 Concentration (%): 30    Neurological Exam:  Mental Status: Intermittent spontaneous eye opening, localizes to daughters voice. Does not follow commands. No attempt to speak. Intubated, off sedation for several days  Cranial Nerves: PERRL slightly sluggish, L eye is slightly exodeviated at times but corrects to midline. Roving eye movements improved today. BTT bilaterally No facial palsy.   Motor: Moves upper > lower extremities spontaneously  Sensation: WD to noxious x4  Reflexes: 1+ throughout at biceps, brachioradialis, triceps, patellars and ankles bilaterally and equal. No clonus.     I personally reviewed the below data/images/labs:      LABS:                          8.5    16.25 )-----------( 232      ( 16 Aug 2023 00:08 )             26.3     08-16    132<L>  |  95<L>  |  69<H>  ----------------------------<  240<H>  3.7   |  22  |  4.35<H>    Ca    9.5      16 Aug 2023 00:08  Phos  4.0     08-16  Mg     3.10     08-16    TPro  5.7<L>  /  Alb  2.5<L>  /  TBili  <0.2  /  DBili  x   /  AST  18  /  ALT  9   /  AlkPhos  117  08-14    LIVER FUNCTIONS - ( 14 Aug 2023 20:50 )  Alb: 2.5 g/dL / Pro: 5.7 g/dL / ALK PHOS: 117 U/L / ALT: 9 U/L / AST: 18 U/L / GGT: x             Urinalysis Basic - ( 16 Aug 2023 00:08 )    Color: x / Appearance: x / SG: x / pH: x  Gluc: 240 mg/dL / Ketone: x  / Bili: x / Urobili: x   Blood: x / Protein: x / Nitrite: x   Leuk Esterase: x / RBC: x / WBC x   Sq Epi: x / Non Sq Epi: x / Bacteria: x      < from: CT Head No Cont (08.15.23 @ 17:20) >    ACC: [id number] EXAM:  CT BRAIN   ORDERED BY: MINAL SAM     PROCEDURE DATE:  08/15/2023          INTERPRETATION:  Clinical indication: Change in neuro exam.    Multiple axial sections were performed from base to vertex without   contrast enhancement. Coronal and sagittal reconstructions were   reformatted well.    This exam is compared prior head CT performed on August 11, 2023    Parenchymal volume loss and chronic microvessel ischemic changes are   again seen.    Abnormal low-attenuation involving the left occipital cortical   subcortical region is again seen. 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    No evidence of acute hemorrhage mass or mass effect is seen.    Evaluation of the osseous structures with appropriate window demonstrates   sclerotic changes about the left mastoid region which appears stable.   Opacification left middle ear region is again seen.    Patient is status post bilateral cataract surgery.    Impression: Stable exam.    < end of copied text >    vEEG:    Clinical Impression:  - Severe diffuse non-specific cerebral dysfunction  - There were no epileptiform abnormalities or seizures recorded.        CTH 8/11:    VENTRICLES AND SULCI: Age-appropriate involutional change  INTRA-AXIAL:  Old left PCA infarct as seen on the prior unchanged.   Microvascular ischemic changes involving the periventricular and   subcortical white matter as seen previously  EXTRA-AXIAL:  No mass or collection is seen.  VISUALIZED SINUSES:  Clear.  VISUALIZED MASTOIDS: Left mastoid sclerosis  CALVARIUM: Infiltrative appearance tothe calvarium may be indicative of   marrow infiltration on the basis of patient's known diagnosis of breast   cancer. MISCELLANEOUS:  None.    IMPRESSION:  No significant interval change compared with 7/17/2023 in   left PCA infarct which is old. Microvascular ischemic changes involving   the periventricular and subcortical white matter as seen   previously.Questionable lesions at the level of the calvarium related to   possible breast CA. Clinical correlation recommended.    --- End of Report ---

## 2023-08-16 NOTE — PROGRESS NOTE ADULT - ASSESSMENT
72 y/o F DM on insulin, HTN, CKD, CHFpEF, breast CA, respiratory arrest and cardiac arrest (2018), CVA with residual weakness, aspiration PNA h/o trache, PEG, both removed presents with respiratory distress requiring intubation. Found to have elevated BNP, may be 2/2 to volume overload i/s/o CKD vs CHF.     Neuro ;  - Sedated : Baseline MS AOx3   -WAS ON PROPOFOL AND FENTANYL BEFORE: NOW OFF:    - Monitor her mental status:  requiring only 30% fio2:    -8/15:  - now she has been off sedation for more then 24 hours but is still lethargic: her o2 requirement has not increased:   -for possible extubation if she wakes up   8/16:  -dw PMD: pt is still lethargic  for MRI brain stem today   CVA   - cont aspirin and statin   Cardiac   -CHFpEF : TTE 7/2023 with EF 59%, with severe LVH and diastolic dysfunction : pro-BNP > 90713, trop 78   -on bumex drip :  -cards following  -on 8/15:   -she is off bumex drip and is on midodrine   8/16  -remains off bumex  Pulmonary   -Acute hypercapnic and hypoxemic respiratory failure   -DDx: aspiration vs volume overload  VBG with respiratory acidosis pCO2 111  - CXR with small right pleural effusion, no consolidations/opacities  -now she seems better:  fio2: 30$:  -? etiology of hypercarbia:  multifactorial : seems to have OHS and TYRONE superimposed by acute chf  ? and aspiration pneumonia:"   -pt is mostly bed bound:   -it is possible that this time:  she mght need bipap on dc : atleast at the night time:   -on 8/15:   still remains intubated  8/16:  -still lethargic:  not extubated hypercarbic acidotic today on abg:  weaning trials :probably would need bipap following extubation  /Renal   -Hyperkalemia with EKG changes  : 7.2, hemolyzed, given insulin and D50 + calcium gluc   -K is better today : cont to monitor  -normal today on 8/15   8/16:  -stable  GI  NPO for now  Endocrine  T2DM : A1C 7.1 in 7/2023   -cont to monitor blood glucose  ID   - No fever/leukocytosis  - Hx latent TB which was treated, no concern for TB  : She was recently ruled out for tuberculosis at [facility]   - Started on empiric vancomycin and aztreonam (cefepime/zosyn allergy? developed rash req prednisone)  : now dced:   defer to MICU   - f/u MRSA   - follow up blood culture/urine   8/16:  -blood cultures negative so far: legionella is negative:  remains off antibiotics  staph aureus on sputum     dw micu and daughter

## 2023-08-17 NOTE — PROGRESS NOTE ADULT - SUBJECTIVE AND OBJECTIVE BOX
Date of Service: 08-17-23 @ 15:04    Patient is a 73y old  Female who presents with a chief complaint of Respiratory distress (17 Aug 2023 14:25)      Any change in ROS: seems little more alert and awake:   still intubated:     at bedside      MEDICATIONS  (STANDING):  albuterol/ipratropium for Nebulization 3 milliLiter(s) Nebulizer every 6 hours  atorvastatin 40 milliGRAM(s) Oral at bedtime  carvedilol 12.5 milliGRAM(s) Oral every 12 hours  chlorhexidine 0.12% Liquid 15 milliLiter(s) Oral Mucosa every 12 hours  chlorhexidine 2% Cloths 1 Application(s) Topical daily  dextrose 5%. 1000 milliLiter(s) (100 mL/Hr) IV Continuous <Continuous>  dextrose 5%. 1000 milliLiter(s) (50 mL/Hr) IV Continuous <Continuous>  dextrose 50% Injectable 25 Gram(s) IV Push once  dextrose 50% Injectable 12.5 Gram(s) IV Push once  dextrose 50% Injectable 25 Gram(s) IV Push once  glucagon  Injectable 1 milliGRAM(s) IntraMuscular once  heparin   Injectable 5000 Unit(s) SubCutaneous every 8 hours  insulin lispro (ADMELOG) corrective regimen sliding scale   SubCutaneous every 6 hours  insulin NPH human recombinant 8 Unit(s) SubCutaneous every 6 hours  sodium chloride 3%  Inhalation 4 milliLiter(s) Inhalation every 6 hours    MEDICATIONS  (PRN):  acetaminophen   Oral Liquid .. 650 milliGRAM(s) Oral every 6 hours PRN Temp greater or equal to 38C (100.4F), Mild Pain (1 - 3)  dextrose Oral Gel 15 Gram(s) Oral once PRN Blood Glucose LESS THAN 70 milliGRAM(s)/deciliter    Vital Signs Last 24 Hrs  T(C): 37.1 (17 Aug 2023 12:00), Max: 37.2 (16 Aug 2023 16:00)  T(F): 98.7 (17 Aug 2023 12:00), Max: 99 (16 Aug 2023 16:00)  HR: 90 (17 Aug 2023 15:00) (77 - 106)  BP: --  BP(mean): --  RR: 25 (17 Aug 2023 15:00) (14 - 28)  SpO2: 99% (17 Aug 2023 15:00) (92% - 100%)    Parameters below as of 17 Aug 2023 15:00  Patient On (Oxygen Delivery Method): ventilator    O2 Concentration (%): 30  Mode: AC/ CMV (Assist Control/ Continuous Mandatory Ventilation)  RR (machine): 14  TV (machine): 360  FiO2: 30  PEEP: 5  ITime: 0.75  MAP: 10  PIP: 33    I&O's Summary    16 Aug 2023 07:01  -  17 Aug 2023 07:00  --------------------------------------------------------  IN: 1020 mL / OUT: 132 mL / NET: 888 mL          Physical Exam:   GENERAL: NAD, well-groomed, well-developed  HEENT: MANDY/   Atraumatic, Normocephalic  ENMT: No tonsillar erythema, exudates, or enlargement; Moist mucous membranes, Good dentition, No lesions  NECK: Supple, No JVD, Normal thyroid  CHEST/LUNG: Clear to auscultaion- no wheezing  CVS: Regular rate and rhythm; No murmurs, rubs, or gallops  GI: : Soft, Nontender, Nondistended; Bowel sounds present  NERVOUS SYSTEM: lethargic : intubated not sedated    LYMPH: No lymphadenopathy noted  SKIN: No rashes or lesions  ENDOCRINOLOGY: No Thyromegaly  PSYCH: Lethargic    Labs:  ABG - ( 17 Aug 2023 00:15 )  pH, Arterial: 7.35  pH, Blood: x     /  pCO2: 48    /  pO2: 105   / HCO3: 26    / Base Excess: 0.6   /  SaO2: 98.8            28, 29, 37                            8.4    17.89 )-----------( 243      ( 17 Aug 2023 00:15 )             26.7                         8.5    16.25 )-----------( 232      ( 16 Aug 2023 00:08 )             26.3                         8.2    13.88 )-----------( 214      ( 15 Aug 2023 12:00 )             25.4                         6.5    11.69 )-----------( 196      ( 15 Aug 2023 02:06 )             20.9                         7.3    12.99 )-----------( 202      ( 14 Aug 2023 01:10 )             23.3     08-17    134<L>  |  96<L>  |  79<H>  ----------------------------<  291<H>  3.5   |  24  |  4.63<H>  08-16    132<L>  |  95<L>  |  69<H>  ----------------------------<  240<H>  3.7   |  22  |  4.35<H>  08-15    133<L>  |  97<L>  |  62<H>  ----------------------------<  185<H>  4.1   |  22  |  4.12<H>  08-14    133<L>  |  96<L>  |  60<H>  ----------------------------<  174<H>  4.2   |  23  |  4.07<H>  08-14    132<L>  |  96<L>  |  56<H>  ----------------------------<  289<H>  4.3   |  22  |  3.79<H>  08-13    132<L>  |  98  |  56<H>  ----------------------------<  256<H>  4.5   |  23  |  3.64<H>    Ca    10.2      17 Aug 2023 00:15  Ca    9.5      16 Aug 2023 00:08  Phos  3.9     08-17  Phos  4.0     08-16  Mg     3.10     08-17  Mg     3.10     08-16    TPro  5.7<L>  /  Alb  2.5<L>  /  TBili  <0.2  /  DBili  x   /  AST  18  /  ALT  9   /  AlkPhos  117  08-14  TPro  5.3<L>  /  Alb  1.8<L>  /  TBili  <0.2  /  DBili  x   /  AST  11  /  ALT  9   /  AlkPhos  124<H>  08-14    CAPILLARY BLOOD GLUCOSE      POCT Blood Glucose.: 368 mg/dL (17 Aug 2023 12:41)  POCT Blood Glucose.: 352 mg/dL (17 Aug 2023 05:08)  POCT Blood Glucose.: 318 mg/dL (16 Aug 2023 23:35)  POCT Blood Glucose.: 256 mg/dL (16 Aug 2023 17:14)          Urinalysis Basic - ( 17 Aug 2023 00:15 )    Color: x / Appearance: x / SG: x / pH: x  Gluc: 291 mg/dL / Ketone: x  / Bili: x / Urobili: x   Blood: x / Protein: x / Nitrite: x   Leuk Esterase: x / RBC: x / WBC x   Sq Epi: x / Non Sq Epi: x / Bacteria: x            RECENT CULTURES:  08-16 @ 15:00 .Sputum Sputum       Few polymorphonuclear leukocytes per low power field  Rare Squamous epithelial cells per low power field  Few Gram Positive Cocci in Clusters per oil power field           Normal Respiratory Cate present    08-11 @ 17:50 .Sputum Sputum   SIMON    Rare polymorphonuclear leukocytes per low power field  Rare Squamous epithelial cells per low power field  Numerous Gram positive cocci in pairs per oil power field    Staphylococcus aureus  Staphylococcus aureus     Numerous Staphylococcus aureus    08-11 @ 12:19 Clean Catch Clean Catch (Midstream)       rad< from: Xray Chest 1 View- PORTABLE-Urgent (Xray Chest 1 View- PORTABLE-Urgent .) (08.13.23 @ 07:09) >  E DATE:  08/13/2023          INTERPRETATION:  CLINICAL INFORMATION: Central line placement    TIME OF EXAMINATION: August 13 at 6:59 AM    EXAM: Portablechest    FINDINGS:  Since the last study, a left IJ line has been placed and its tip is at   the origin of the SVC. No complicating pneumothorax.    Endotracheal tube with tip just above the ines and enteric tube in   place.    Vascular congestion with right pleural thickening unchanged.    Heart is not enlarged with a loop recorder. No effusions visualized.        COMPARISON: August 12        IMPRESSION: Status post left IJ central line placement.    --- End of Report ---            LIZA WOODY MD; Attending Radiologist  This document has been electronically signed. Aug 13 2023 10:08AM    < end of copied text >  < from: Xray Chest 1 View- PORTABLE-Urgent (Xray Chest 1 View- PORTABLE-Urgent .) (08.12.23 @ 06:08) >    New left IJ central line with tip in the SVC. No complicating   pneumothorax. No change in the appearance of the heart, lungs or pleura.    August 12 at 5:14 AM:  Enteric tube has been advanced and the sidehole and tip remain in the   body of the stomach. Endotracheal tube again seen with tip just above the   ines.    Left IJ line no longer seen. Lungs show no effusions or pneumothorax.        COMPARISON: August 11 at 7:29 AM        IMPRESSION:  Endotracheal tube with tip above the ines.  Left IJ central line pre and post removal.    No layering effusion or pneumothorax.    --- End of Report ---            LIZA WOODY MD; Attending Radiologist    < end of copied text >           No growth    08-11 @ 08:11 .Blood Blood-Peripheral                No growth at 5 days          RESPIRATORY CULTURES:          Studies  Chest X-RAY  CT SCAN Chest   Venous Dopplers: LE:   CT Abdomen  Others

## 2023-08-17 NOTE — PROGRESS NOTE ADULT - SUBJECTIVE AND OBJECTIVE BOX
Patient is a 73y old  Female who presents with a chief complaint of Respiratory distress (17 Aug 2023 16:09)      SUBJECTIVE / OVERNIGHT EVENTS: remains intubated in MICU, ptn is more responsive as per her  at bedside in MICU, allergic rash looks better, off sedation, on tube feeds. worsening renal function w oliguria. may need a renal biopsy next week. might need HD in the next 2 days. MR brain pending to image brainstem. ptn is not triggering the vent    MEDICATIONS  (STANDING):  albuterol/ipratropium for Nebulization 3 milliLiter(s) Nebulizer every 6 hours  atorvastatin 40 milliGRAM(s) Oral at bedtime  carvedilol 12.5 milliGRAM(s) Oral every 12 hours  chlorhexidine 0.12% Liquid 15 milliLiter(s) Oral Mucosa every 12 hours  chlorhexidine 2% Cloths 1 Application(s) Topical daily  dextrose 5%. 1000 milliLiter(s) (50 mL/Hr) IV Continuous <Continuous>  dextrose 5%. 1000 milliLiter(s) (100 mL/Hr) IV Continuous <Continuous>  dextrose 50% Injectable 25 Gram(s) IV Push once  dextrose 50% Injectable 12.5 Gram(s) IV Push once  dextrose 50% Injectable 25 Gram(s) IV Push once  glucagon  Injectable 1 milliGRAM(s) IntraMuscular once  heparin   Injectable 5000 Unit(s) SubCutaneous every 8 hours  insulin lispro (ADMELOG) corrective regimen sliding scale   SubCutaneous every 6 hours  insulin NPH human recombinant 8 Unit(s) SubCutaneous every 6 hours  sodium chloride 3%  Inhalation 4 milliLiter(s) Inhalation every 6 hours    MEDICATIONS  (PRN):  acetaminophen   Oral Liquid .. 650 milliGRAM(s) Oral every 6 hours PRN Temp greater or equal to 38C (100.4F), Mild Pain (1 - 3)  dextrose Oral Gel 15 Gram(s) Oral once PRN Blood Glucose LESS THAN 70 milliGRAM(s)/deciliter      Vital Signs Last 24 Hrs  T(F): 98.6 (08-17-23 @ 20:00), Max: 99.2 (08-17-23 @ 16:00)  HR: 74 (08-17-23 @ 22:00) (74 - 105)  BP: --  RR: 17 (08-17-23 @ 22:00) (14 - 28)  SpO2: 100% (08-17-23 @ 22:00) (92% - 100%)  Telemetry:   CAPILLARY BLOOD GLUCOSE      POCT Blood Glucose.: 302 mg/dL (17 Aug 2023 17:40)  POCT Blood Glucose.: 368 mg/dL (17 Aug 2023 12:41)  POCT Blood Glucose.: 352 mg/dL (17 Aug 2023 05:08)  POCT Blood Glucose.: 318 mg/dL (16 Aug 2023 23:35)    I&O's Summary    16 Aug 2023 07:01  -  17 Aug 2023 07:00  --------------------------------------------------------  IN: 1020 mL / OUT: 132 mL / NET: 888 mL    17 Aug 2023 07:01  -  17 Aug 2023 22:22  --------------------------------------------------------  IN: 480 mL / OUT: 0 mL / NET: 480 mL        PHYSICAL EXAM:  GENERAL: NAD, well-developed  HEAD:  Atraumatic, Normocephalic  EYES: EOMI, PERRLA, conjunctiva and sclera clear  NECK: Supple, No JVD  CHEST/LUNG: Clear to auscultation bilaterally; No wheeze  HEART: Regular rate and rhythm; No murmurs, rubs, or gallops  ABDOMEN: Soft, Nontender, Nondistended; Bowel sounds present  EXTREMITIES:  2+ Peripheral Pulses, No clubbing, cyanosis, or edema  PSYCH: AAOx3  NEUROLOGY: non-focal  SKIN: No rashes or lesions    LABS:                        8.4    17.89 )-----------( 243      ( 17 Aug 2023 00:15 )             26.7     08-17    134<L>  |  96<L>  |  79<H>  ----------------------------<  291<H>  3.5   |  24  |  4.63<H>    Ca    10.2      17 Aug 2023 00:15  Phos  3.9     08-17  Mg     3.10     08-17            Urinalysis Basic - ( 17 Aug 2023 00:15 )    Color: x / Appearance: x / SG: x / pH: x  Gluc: 291 mg/dL / Ketone: x  / Bili: x / Urobili: x   Blood: x / Protein: x / Nitrite: x   Leuk Esterase: x / RBC: x / WBC x   Sq Epi: x / Non Sq Epi: x / Bacteria: x        RADIOLOGY & ADDITIONAL TESTS:    Imaging Personally Reviewed:    Consultant(s) Notes Reviewed:      Care Discussed with Consultants/Other Providers:

## 2023-08-17 NOTE — PROGRESS NOTE ADULT - SUBJECTIVE AND OBJECTIVE BOX
Neurology Progress Note    S: Patient seen and examined. WBC increasing. Neurologically unchanged from yesterday      Medications: MEDICATIONS  (STANDING):  albuterol/ipratropium for Nebulization 3 milliLiter(s) Nebulizer every 6 hours  aspirin  chewable 81 milliGRAM(s) Oral daily  atorvastatin 40 milliGRAM(s) Oral at bedtime  chlorhexidine 0.12% Liquid 15 milliLiter(s) Oral Mucosa every 12 hours  chlorhexidine 2% Cloths 1 Application(s) Topical daily  dextrose 5%. 1000 milliLiter(s) (50 mL/Hr) IV Continuous <Continuous>  dextrose 5%. 1000 milliLiter(s) (100 mL/Hr) IV Continuous <Continuous>  dextrose 50% Injectable 25 Gram(s) IV Push once  dextrose 50% Injectable 12.5 Gram(s) IV Push once  dextrose 50% Injectable 25 Gram(s) IV Push once  glucagon  Injectable 1 milliGRAM(s) IntraMuscular once  heparin   Injectable 5000 Unit(s) SubCutaneous every 8 hours  insulin lispro (ADMELOG) corrective regimen sliding scale   SubCutaneous every 6 hours  insulin NPH human recombinant 5 Unit(s) SubCutaneous every 6 hours  sodium chloride 3%  Inhalation 4 milliLiter(s) Inhalation every 6 hours    MEDICATIONS  (PRN):  acetaminophen   Oral Liquid .. 650 milliGRAM(s) Oral every 6 hours PRN Temp greater or equal to 38C (100.4F), Mild Pain (1 - 3)  dextrose Oral Gel 15 Gram(s) Oral once PRN Blood Glucose LESS THAN 70 milliGRAM(s)/deciliter       Vitals:  Vital Signs Last 24 Hrs  T(C): 36.8 (16 Aug 2023 12:00), Max: 38.1 (15 Aug 2023 20:00)  T(F): 98.3 (16 Aug 2023 12:00), Max: 100.5 (15 Aug 2023 20:00)  HR: 85 (16 Aug 2023 13:00) (74 - 107)  BP: --  BP(mean): --  RR: 14 (16 Aug 2023 13:00) (12 - 27)  SpO2: 98% (16 Aug 2023 13:00) (95% - 100%)    Parameters below as of 16 Aug 2023 13:00  Patient On (Oxygen Delivery Method): ventilator    O2 Concentration (%): 30    Neurological Exam:  Mental Status: Intermittent spontaneous eye opening, localizes to daughters voice. Does not follow commands. No attempt to speak. Intubated, off sedation for several days  Cranial Nerves: PERRL slightly sluggish, L eye is slightly exodeviated at times but corrects to midline. Roving eye movements improved today. BTT bilaterally No facial palsy.   Motor: Moves upper > lower extremities spontaneously  Sensation: WD to noxious x4  Reflexes: 1+ throughout at biceps, brachioradialis, triceps, patellars and ankles bilaterally and equal. No clonus.     I personally reviewed the below data/images/labs:      LABS:                          8.5    16.25 )-----------( 232      ( 16 Aug 2023 00:08 )             26.3     08-16    132<L>  |  95<L>  |  69<H>  ----------------------------<  240<H>  3.7   |  22  |  4.35<H>    Ca    9.5      16 Aug 2023 00:08  Phos  4.0     08-16  Mg     3.10     08-16    TPro  5.7<L>  /  Alb  2.5<L>  /  TBili  <0.2  /  DBili  x   /  AST  18  /  ALT  9   /  AlkPhos  117  08-14    LIVER FUNCTIONS - ( 14 Aug 2023 20:50 )  Alb: 2.5 g/dL / Pro: 5.7 g/dL / ALK PHOS: 117 U/L / ALT: 9 U/L / AST: 18 U/L / GGT: x             Urinalysis Basic - ( 16 Aug 2023 00:08 )    Color: x / Appearance: x / SG: x / pH: x  Gluc: 240 mg/dL / Ketone: x  / Bili: x / Urobili: x   Blood: x / Protein: x / Nitrite: x   Leuk Esterase: x / RBC: x / WBC x   Sq Epi: x / Non Sq Epi: x / Bacteria: x      < from: CT Head No Cont (08.15.23 @ 17:20) >    ACC: [id number] EXAM:  CT BRAIN   ORDERED BY: MINAL SAM     PROCEDURE DATE:  08/15/2023          INTERPRETATION:  Clinical indication: Change in neuro exam.    Multiple axial sections were performed from base to vertex without   contrast enhancement. Coronal and sagittal reconstructions were   reformatted well.    This exam is compared prior head CT performed on August 11, 2023    Parenchymal volume loss and chronic microvessel ischemic changes are   again seen.    Abnormal low-attenuation involving the left occipital cortical   subcortical region is again seen. 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    No evidence of acute hemorrhage mass or mass effect is seen.    Evaluation of the osseous structures with appropriate window demonstrates   sclerotic changes about the left mastoid region which appears stable.   Opacification left middle ear region is again seen.    Patient is status post bilateral cataract surgery.    Impression: Stable exam.    < end of copied text >    vEEG:    Clinical Impression:  - Severe diffuse non-specific cerebral dysfunction  - There were no epileptiform abnormalities or seizures recorded.        CTH 8/11:    VENTRICLES AND SULCI: Age-appropriate involutional change  INTRA-AXIAL:  Old left PCA infarct as seen on the prior unchanged.   Microvascular ischemic changes involving the periventricular and   subcortical white matter as seen previously  EXTRA-AXIAL:  No mass or collection is seen.  VISUALIZED SINUSES:  Clear.  VISUALIZED MASTOIDS: Left mastoid sclerosis  CALVARIUM: Infiltrative appearance tothe calvarium may be indicative of   marrow infiltration on the basis of patient's known diagnosis of breast   cancer. MISCELLANEOUS:  None.    IMPRESSION:  No significant interval change compared with 7/17/2023 in   left PCA infarct which is old. Microvascular ischemic changes involving   the periventricular and subcortical white matter as seen   previously.Questionable lesions at the level of the calvarium related to   possible breast CA. Clinical correlation recommended.    --- End of Report ---

## 2023-08-17 NOTE — PROGRESS NOTE ADULT - ATTENDING COMMENTS
Patient examined and case reviewed in detail on bedside rounds  Critically ill and unstable on vent with progressive RUSS/eosinophilia/drug rash/persistent lethargy May need HD For renal bx next week due to aspirin exposure  Frequent bedside visits with therapy change today.   I have personally provided 35+ minutes of critical care time concurrently with the resident/fellow; this excludes time spent on separate procedures.  I have personally provided 75+ minutes of critical care time concurrently with the resident/fellow; this excludes time spent on separate procedures.

## 2023-08-17 NOTE — CONSULT NOTE ADULT - SUBJECTIVE AND OBJECTIVE BOX
Interventional Radiology    HPI: 73y Female with HTN, DM2, CVA, breast CA-bilateral mastectomy, recurrent aspiration pneumonia/respiratory failure, and CKD, 8/11/23 p/w acute hypercapnic respiratory failure; c/b RUSS.    Allergies: isoniazid (Rash)  nafcillin (Unknown)  hydrALAZINE (Rash)  vitamin E (Short breath; Urticaria; Hives)  doxycycline (Rash)  cefepime (Rash)  NIFEdipine (Urticaria; Hives)    Medications (Abx/Cardiac/Anticoagulation/Blood Products)    aspirin  chewable: 81 milliGRAM(s) Oral (08-17 @ 12:43)  heparin   Injectable: 5000 Unit(s) SubCutaneous (08-17 @ 13:25)  midodrine: 10 milliGRAM(s) Oral (08-15 @ 21:04)    Data:    T(C): 36.7  HR: 92  BP: --  RR: 16  SpO2: 100%    -WBC 17.89 / HgB 8.4 / Hct 26.7 / Plt 243  -Na 134 / Cl 96 / BUN 79 / Glucose 291  -K 3.5 / CO2 24 / Cr 4.63  -ALT -- / Alk Phos -- / T.Bili --  -INR 1.19 / PTT 22.9      Imaging: [email or web address].     -----------------------------------------------------------------------------------------------------------------------------------------------------------------------    Assessment/Plan: 73y Female with HTN, DM2, CVA, breast CA-bilateral mastectomy, recurrent aspiration pneumonia/respiratory failure, and CKD, 8/11/23 p/w acute hypercapnic respiratory failure; c/b RUSS. IR consulted for renal biopsy to evaluate for ischemic ATN vs AIN.     -- IR will plan to perform renal biopsy on Tues 8/22  -- ASA discontinued 8/17, hold until procedure  -- Elevated BP, will need BP < 150/90 for at least 24 hour period prior to procedure without spikes above this cutoff.  -- NPO at midnight on Mon 8/21  -- 4AM coags and labs, active T&S  -- Please place IR procedure request order under Dr. Chavira and write pre-procedure note.  -- In patient is unable to be medically optimized or discharged prior to procedure, biopsy can be scheduled outpatient, contact [phone].  -- Dr. Acosta informed.    Thank You for the consultation.      Harley Ghotra D.O.  Radiology Resident (PGY-3)   Available on [employer] TEAMS (preferred)  [facility] Pager #[phone] Interventional Radiology    HPI: 73y Female with HTN, DM2, CVA, breast CA-bilateral mastectomy, recurrent aspiration pneumonia/respiratory failure, and CKD, 8/11/23 p/w acute hypercapnic respiratory failure; c/b RUSS.    Allergies: isoniazid (Rash)  nafcillin (Unknown)  hydrALAZINE (Rash)  vitamin E (Short breath; Urticaria; Hives)  doxycycline (Rash)  cefepime (Rash)  NIFEdipine (Urticaria; Hives)    Medications (Abx/Cardiac/Anticoagulation/Blood Products)    aspirin  chewable: 81 milliGRAM(s) Oral (08-17 @ 12:43)  heparin   Injectable: 5000 Unit(s) SubCutaneous (08-17 @ 13:25)  midodrine: 10 milliGRAM(s) Oral (08-15 @ 21:04)    Data:    T(C): 36.7  HR: 92  BP: --  RR: 16  SpO2: 100%    -WBC 17.89 / HgB 8.4 / Hct 26.7 / Plt 243  -Na 134 / Cl 96 / BUN 79 / Glucose 291  -K 3.5 / CO2 24 / Cr 4.63  -ALT -- / Alk Phos -- / T.Bili --  -INR 1.19 / PTT 22.9      Imaging: [email or web address].     -----------------------------------------------------------------------------------------------------------------------------------------------------------------------    Assessment/Plan: 73y Female with HTN, DM2, CVA, breast CA-bilateral mastectomy, recurrent aspiration pneumonia/respiratory failure, and CKD, 8/11/23 p/w acute hypercapnic respiratory failure; c/b RUSS. IR consulted for renal biopsy to evaluate for ischemic ATN vs AIN.     -- IR will plan to perform renal biopsy on Tues 8/22  -- ASA discontinued 8/17, hold until after procedure, can be restarted 24 hours after procedure.  -- Elevated BP, will need BP < 150/90 for at least 24 hour period prior to procedure without spikes above this cutoff.  -- NPO at midnight on Mon 8/21  -- 4AM coags and labs, active T&S  -- Please place IR procedure request order under Dr. Chavira and write pre-procedure note.  -- In patient is unable to be medically optimized or discharged prior to procedure, biopsy can be scheduled outpatient, contact [phone].  -- Dr. Acosta informed.    Thank You for the consultation.      Harley Ghotra D.O.  Radiology Resident (PGY-3)   Available on [employer] TEAMS (preferred)  [facility] IR Pager #[phone] Interventional Radiology    HPI: 73y Female with HTN, DM2, CVA, breast CA-bilateral mastectomy, recurrent aspiration pneumonia/respiratory failure, and CKD, 8/11/23 p/w acute hypercapnic respiratory failure; c/b RUSS.    Allergies: isoniazid (Rash)  nafcillin (Unknown)  hydrALAZINE (Rash)  vitamin E (Short breath; Urticaria; Hives)  doxycycline (Rash)  cefepime (Rash)  NIFEdipine (Urticaria; Hives)    Medications (Abx/Cardiac/Anticoagulation/Blood Products)    aspirin  chewable: 81 milliGRAM(s) Oral (08-17 @ 12:43)  heparin   Injectable: 5000 Unit(s) SubCutaneous (08-17 @ 13:25)  midodrine: 10 milliGRAM(s) Oral (08-15 @ 21:04)    Data:    T(C): 36.7  HR: 92  BP: --  RR: 16  SpO2: 100%    -WBC 17.89 / HgB 8.4 / Hct 26.7 / Plt 243  -Na 134 / Cl 96 / BUN 79 / Glucose 291  -K 3.5 / CO2 24 / Cr 4.63  -ALT -- / Alk Phos -- / T.Bili --  -INR 1.19 / PTT 22.9      Imaging: [email or web address].     -----------------------------------------------------------------------------------------------------------------------------------------------------------------------    Assessment/Plan: 73y Female with HTN, DM2, CVA, breast CA-bilateral mastectomy, recurrent aspiration pneumonia/respiratory failure, and CKD, 8/11/23 p/w acute hypercapnic respiratory failure; c/b RUSS. IR consulted for renal biopsy to evaluate for ischemic ATN vs AIN.     -- IR will plan to perform renal biopsy on Tues 8/22  -- ASA discontinued 8/17, hold until after procedure, can be restarted 24 hours after procedure.  -- Elevated BP, will need BP < 140/90 for at least 24 hour period prior to procedure without spikes above this cutoff.  -- NPO at midnight on Mon 8/21  -- Early AM coags and labs on procedure day, active T&S  -- Please place IR procedure request order under Dr. Chavira and write pre-procedure note.  -- In patient is unable to be medically optimized or discharged prior to procedure, biopsy can be scheduled outpatient, contact [phone].  -- Dr. Acosta informed.    Thank You for the consultation.      Harley Ghotra D.O.  Radiology Resident (PGY-3)   Available on [employer] TEAMS (preferred)  [facility] IR Pager #[phone]

## 2023-08-17 NOTE — PROGRESS NOTE ADULT - ASSESSMENT
72 y/o F DM on insulin, HTN, CKD, CHFpEF, breast CA, respiratory arrest and cardiac arrest (2018), CVA with residual weakness, aspiration PNA h/o trache, PEG, both removed presents with respiratory distress requiring intubation. May be volume overload i/s/o CHF/CKD vs decrease respiratory drive (given oxygen at home). Worsening mental status off sedation, will further work up with imaging.     Neuro   #AMS  #Metabolic encephalopathy   Baseline MS AOx3   CT head without acute change  Previously on prop and fentanyl which were weaned, now MS slightly improved - following commands and slightly responsive to pain, still not triggering vent   Spoke with neurology, not inclined to get LP   EEG without epileptiform activity   Appears to have disconjugate eyes, CT head without acute changes   - MRI brain also ordered to visualize brainstem     #CVA   - c/w aspirin and statin     Cardiac   #Shock  - Likely mixed septic and vasoplegic from prop  - weaned vaso and levo, now HDS  - d/c midodrine for hypertension  - can start home coreg 37.5 BID if still HTN    #CHFpEF   TTE 7/2023 with EF 59%, with severe LVH and diastolic dysfunction   pro-BNP > 54033, trop 78   Repeat TTE with EF 60% normal systolic and grade 1 diastolic dysfunction   - holding home coreg 37.5 BID     Pulmonary   #Acute hypercapnic and hypoxemic respiratory failure   DDx: aspiration vs volume overload vs sedating medication decreasing drive (was given nyquil)   VBG with respiratory acidosis pCO2 111  CXR with small right pleural effusion, no consolidations/opacities  - c/w mechanical ventilation   - monitor blood gas while on vent      /Renal   #RUSS on CKD   Ddx: obstructive (willard in, no hydro on POCUS) vs low flow state vs AIN i/s/o cephalosporin use and drug rash   Urinalysis with 7 WBC, not pursing steroids at this time per nephro  Worsening creatinine still but no urgent indication for HD, ATN   - trialed diuretics without improvement   - Continue to monitor, dose meds per eGFR. avoid nephrotoxic agents    #Hyperkalemia with EKG changes    7.2, hemolyzed, given insulin and D50 + calcium gluc   Likely i/s/o renal dysfunction   Received lasix 80 and 40 IV initially but without adequate response   Given bumex 2mg the morning of 8/12  Per nephro, started on bumex gtt with worsening creatinine   - decreasing urine output, now oliguric   - off bumex gtt       GI  Diet: NPO with tube feeds     Endocrine  #T2DM   A1C 7.1 in 7/2023   - c/w NPH to 5U q6  - BG goal 140-200     ID   No fever/leukocytosis, recheck temp   Hx latent TB which was treated, no concern for TB   Started on empiric vancomycin and aztreonam (cefepime/zosyn allergy? developed rash req prednisone)   Trialed cefepime --> cefazolin (sputum MSSA), developed drug eruption - discontinued   Blood and urine cultures 8/11 demonstrating no growth currently  Rising leukocytosis with low grade temp, pt with A-line concern for CLABSI?   - start empiric antibx if febrile > 101  - pending repeat BC and UC    Heme/Onc  ppx: heparin q8 hrs     Ethics  GOC - Per previous GOC with daughter and , reported that they have not talked about end of life care with the patient. For now, they wanted "everything to be done" including CPR, continuing with mech ventilation/intubation, pressors   74 y/o F DM on insulin, HTN, CKD, CHFpEF, breast CA, respiratory arrest and cardiac arrest (2018), CVA with residual weakness, aspiration PNA h/o trache, PEG, both removed presents with respiratory distress requiring intubation. May be volume overload i/s/o CHF/CKD vs decrease respiratory drive (given oxygen at home). Improving mental status however worsening renal function and anuria, concerning for ATN vs AIN.     Neuro   #AMS  #Metabolic encephalopathy   Baseline MS AOx3   CT head without acute change  Previously on prop and fentanyl which were weaned, now MS slightly improved - following commands and slightly responsive to pain, still not triggering vent   Spoke with neurology, not inclined to get LP   EEG without epileptiform activity   Appears to have disconjugate eyes, CT head without acute changes   - MRI brain also ordered to visualize brainstem     #CVA   - c/w aspirin and statin     Cardiac   #Shock  - Likely mixed septic and vasoplegic from prop  - weaned vaso and levo, now HDS  - d/c midodrine for hypertension  - started coreg 12.5     #CHFpEF   TTE 7/2023 with EF 59%, with severe LVH and diastolic dysfunction   pro-BNP > 54769, trop 78   Repeat TTE with EF 60% normal systolic and grade 1 diastolic dysfunction   - holding home coreg 37.5 BID     Pulmonary   #Acute hypercapnic and hypoxemic respiratory failure   DDx: aspiration vs volume overload vs sedating medication decreasing drive (was given nyquil)   VBG with respiratory acidosis pCO2 111  CXR with small right pleural effusion, no consolidations/opacities  - c/w mechanical ventilation   - monitor blood gas while on vent      /Renal   #RUSS on CKD   Ddx: obstructive (willard in, no hydro on POCUS) vs low flow state vs AIN i/s/o cephalosporin use and drug rash   Urinalysis with 7 WBC, would not pursue steroids at this time per nephro  Worsening creatinine still but no urgent indication for HD, ATN   - considering kidney biopsy to r/o AIN   - trialed diuretics without improvement   - Continue to monitor, dose meds per eGFR. avoid nephrotoxic agents    #Hyperkalemia with EKG changes    7.2, hemolyzed, given insulin and D50 + calcium gluc   Likely i/s/o renal dysfunction   Received lasix 80 and 40 IV initially but without adequate response   Given bumex 2mg the morning of 8/12  Per nephro, started on bumex gtt with worsening creatinine   - decreasing urine output, now oliguric   - off bumex gtt       GI  Diet: NPO with tube feeds     Endocrine  #T2DM   A1C 7.1 in 7/2023   - c/w NPH to 8U q6 with mod SS   - BG goal 140-200     ID   No fever/leukocytosis, recheck temp   Hx latent TB which was treated, no concern for TB   Started on empiric vancomycin and aztreonam (cefepime/zosyn allergy? developed rash req prednisone)   Trialed cefepime --> cefazolin (sputum MSSA), developed drug eruption - discontinued   Blood and urine cultures 8/11 demonstrating no growth currently  Rising leukocytosis with low grade temp, pt with A-line concern for CLABSI?   - start empiric antibx if febrile > 101  - pending repeat BC and UC    Heme/Onc  ppx: heparin q8 hrs     Ethics  GOC - Per previous GOC with daughter and , reported that they have not talked about end of life care with the patient. For now, they wanted "everything to be done" including CPR, continuing with mech ventilation/intubation, pressors

## 2023-08-17 NOTE — PROGRESS NOTE ADULT - SUBJECTIVE AND OBJECTIVE BOX
INTERVAL HPI/OVERNIGHT EVENTS:    SUBJECTIVE: Patient seen and examined at bedside. Intubated      OBJECTIVE:    VITAL SIGNS:  ICU Vital Signs Last 24 Hrs  T(C): 37.2 (17 Aug 2023 00:00), Max: 37.3 (16 Aug 2023 08:00)  T(F): 99 (17 Aug 2023 00:00), Max: 99.1 (16 Aug 2023 08:00)  HR: 87 (17 Aug 2023 07:00) (74 - 106)  BP: --  BP(mean): --  ABP: 177/47 (17 Aug 2023 07:00) (122/47 - 201/51)  ABP(mean): 92 (17 Aug 2023 07:00) (65 - 115)  RR: 19 (17 Aug 2023 07:00) (14 - 22)  SpO2: 92% (17 Aug 2023 07:00) (92% - 100%)    O2 Parameters below as of 17 Aug 2023 07:00  Patient On (Oxygen Delivery Method): ventilator    O2 Concentration (%): 30      Mode: AC/ CMV (Assist Control/ Continuous Mandatory Ventilation), RR (machine): 14, TV (machine): 360, FiO2: 30, PEEP: 5, MAP: 12, PIP: 43    08-16 @ 07:01  -  08-17 @ 07:00  --------------------------------------------------------  IN: 1020 mL / OUT: 132 mL / NET: 888 mL      CAPILLARY BLOOD GLUCOSE      POCT Blood Glucose.: 352 mg/dL (17 Aug 2023 05:08)      PHYSICAL EXAM:    General: NAD, intubated, opens eyes to command  HEENT: NC/AT; PERRL  Neck: supple  Respiratory: CTA b/l, not triggering vent   Cardiovascular: +S1/S2; RRR  Abdomen: soft, NT/ND; +BS x4  Extremities: WWP, 2+ peripheral pulses b/l; no LE edema  Skin: erythematous rash     MEDICATIONS:  MEDICATIONS  (STANDING):  albuterol/ipratropium for Nebulization 3 milliLiter(s) Nebulizer every 6 hours  aspirin  chewable 81 milliGRAM(s) Oral daily  atorvastatin 40 milliGRAM(s) Oral at bedtime  chlorhexidine 0.12% Liquid 15 milliLiter(s) Oral Mucosa every 12 hours  chlorhexidine 2% Cloths 1 Application(s) Topical daily  dextrose 5%. 1000 milliLiter(s) (100 mL/Hr) IV Continuous <Continuous>  dextrose 5%. 1000 milliLiter(s) (50 mL/Hr) IV Continuous <Continuous>  dextrose 50% Injectable 25 Gram(s) IV Push once  dextrose 50% Injectable 12.5 Gram(s) IV Push once  dextrose 50% Injectable 25 Gram(s) IV Push once  glucagon  Injectable 1 milliGRAM(s) IntraMuscular once  heparin   Injectable 5000 Unit(s) SubCutaneous every 8 hours  insulin lispro (ADMELOG) corrective regimen sliding scale   SubCutaneous every 6 hours  insulin NPH human recombinant 8 Unit(s) SubCutaneous every 6 hours  sodium chloride 3%  Inhalation 4 milliLiter(s) Inhalation every 6 hours    MEDICATIONS  (PRN):  acetaminophen   Oral Liquid .. 650 milliGRAM(s) Oral every 6 hours PRN Temp greater or equal to 38C (100.4F), Mild Pain (1 - 3)  dextrose Oral Gel 15 Gram(s) Oral once PRN Blood Glucose LESS THAN 70 milliGRAM(s)/deciliter      ALLERGIES:  Allergies    isoniazid (Rash)  nafcillin (Unknown)  hydrALAZINE (Rash)  vitamin E (Short breath; Urticaria; Hives)  doxycycline (Rash)  cefepime (Rash)  NIFEdipine (Urticaria; Hives)    Intolerances        LABS:                        8.4    17.89 )-----------( 243      ( 17 Aug 2023 00:15 )             26.7     08-17    134<L>  |  96<L>  |  79<H>  ----------------------------<  291<H>  3.5   |  24  |  4.63<H>    Ca    10.2      17 Aug 2023 00:15  Phos  3.9     08-17  Mg     3.10     08-17        Urinalysis Basic - ( 17 Aug 2023 00:15 )    Color: x / Appearance: x / SG: x / pH: x  Gluc: 291 mg/dL / Ketone: x  / Bili: x / Urobili: x   Blood: x / Protein: x / Nitrite: x   Leuk Esterase: x / RBC: x / WBC x   Sq Epi: x / Non Sq Epi: x / Bacteria: x        RADIOLOGY & ADDITIONAL TESTS: Reviewed. INTERVAL HPI/OVERNIGHT EVENTS:    SUBJECTIVE: Patient seen and examined at bedside. Intubated and off sedation. Responsive to some commands, now squeezing hands. However, not triggering ventilator.       OBJECTIVE:    VITAL SIGNS:  ICU Vital Signs Last 24 Hrs  T(C): 37.2 (17 Aug 2023 00:00), Max: 37.3 (16 Aug 2023 08:00)  T(F): 99 (17 Aug 2023 00:00), Max: 99.1 (16 Aug 2023 08:00)  HR: 87 (17 Aug 2023 07:00) (74 - 106)  BP: --  BP(mean): --  ABP: 177/47 (17 Aug 2023 07:00) (122/47 - 201/51)  ABP(mean): 92 (17 Aug 2023 07:00) (65 - 115)  RR: 19 (17 Aug 2023 07:00) (14 - 22)  SpO2: 92% (17 Aug 2023 07:00) (92% - 100%)    O2 Parameters below as of 17 Aug 2023 07:00  Patient On (Oxygen Delivery Method): ventilator    O2 Concentration (%): 30      Mode: AC/ CMV (Assist Control/ Continuous Mandatory Ventilation), RR (machine): 14, TV (machine): 360, FiO2: 30, PEEP: 5, MAP: 12, PIP: 43    08-16 @ 07:01  -  08-17 @ 07:00  --------------------------------------------------------  IN: 1020 mL / OUT: 132 mL / NET: 888 mL      CAPILLARY BLOOD GLUCOSE      POCT Blood Glucose.: 352 mg/dL (17 Aug 2023 05:08)      PHYSICAL EXAM:    General: NAD, intubated, opens eyes to command  HEENT: NC/AT; PERRL  Neck: supple  Respiratory: CTA b/l, not triggering vent   Cardiovascular: +S1/S2; RRR  Abdomen: soft, NT/ND; +BS x4  Extremities: WWP, 2+ peripheral pulses b/l; no LE edema  Skin: erythematous rash     MEDICATIONS:  MEDICATIONS  (STANDING):  albuterol/ipratropium for Nebulization 3 milliLiter(s) Nebulizer every 6 hours  aspirin  chewable 81 milliGRAM(s) Oral daily  atorvastatin 40 milliGRAM(s) Oral at bedtime  chlorhexidine 0.12% Liquid 15 milliLiter(s) Oral Mucosa every 12 hours  chlorhexidine 2% Cloths 1 Application(s) Topical daily  dextrose 5%. 1000 milliLiter(s) (100 mL/Hr) IV Continuous <Continuous>  dextrose 5%. 1000 milliLiter(s) (50 mL/Hr) IV Continuous <Continuous>  dextrose 50% Injectable 25 Gram(s) IV Push once  dextrose 50% Injectable 12.5 Gram(s) IV Push once  dextrose 50% Injectable 25 Gram(s) IV Push once  glucagon  Injectable 1 milliGRAM(s) IntraMuscular once  heparin   Injectable 5000 Unit(s) SubCutaneous every 8 hours  insulin lispro (ADMELOG) corrective regimen sliding scale   SubCutaneous every 6 hours  insulin NPH human recombinant 8 Unit(s) SubCutaneous every 6 hours  sodium chloride 3%  Inhalation 4 milliLiter(s) Inhalation every 6 hours    MEDICATIONS  (PRN):  acetaminophen   Oral Liquid .. 650 milliGRAM(s) Oral every 6 hours PRN Temp greater or equal to 38C (100.4F), Mild Pain (1 - 3)  dextrose Oral Gel 15 Gram(s) Oral once PRN Blood Glucose LESS THAN 70 milliGRAM(s)/deciliter      ALLERGIES:  Allergies    isoniazid (Rash)  nafcillin (Unknown)  hydrALAZINE (Rash)  vitamin E (Short breath; Urticaria; Hives)  doxycycline (Rash)  cefepime (Rash)  NIFEdipine (Urticaria; Hives)    Intolerances        LABS:                        8.4    17.89 )-----------( 243      ( 17 Aug 2023 00:15 )             26.7     08-17    134<L>  |  96<L>  |  79<H>  ----------------------------<  291<H>  3.5   |  24  |  4.63<H>    Ca    10.2      17 Aug 2023 00:15  Phos  3.9     08-17  Mg     3.10     08-17        Urinalysis Basic - ( 17 Aug 2023 00:15 )    Color: x / Appearance: x / SG: x / pH: x  Gluc: 291 mg/dL / Ketone: x  / Bili: x / Urobili: x   Blood: x / Protein: x / Nitrite: x   Leuk Esterase: x / RBC: x / WBC x   Sq Epi: x / Non Sq Epi: x / Bacteria: x        RADIOLOGY & ADDITIONAL TESTS: Reviewed.

## 2023-08-17 NOTE — PROGRESS NOTE ADULT - ASSESSMENT
72 y/o F DM on insulin, HTN, CKD, CHFpEF, breast CA, respiratory arrest and cardiac arrest (2018), CVA with residual weakness, aspiration PNA h/o trache, PEG, both removed presents with respiratory distress requiring intubation. Found to have elevated BNP, may be 2/2 to volume overload i/s/o CKD vs CHF.     Neuro ;  - Sedated : Baseline MS AOx3   -WAS ON PROPOFOL AND FENTANYL BEFORE: NOW OFF:    - Monitor her mental status:  requiring only 30% fio2:    -8/15:  - now she has been off sedation for more then 24 hours but is still lethargic: her o2 requirement has not increased:   -for possible extubation if she wakes up   8/16:  -dw PMD: pt is still lethargic  for MRI brain stem today   8/17"  -had ct chead: OK: awaiting MRI brain : still lethargic  CVA   - cont aspirin and statin   Cardiac   -CHFpEF : TTE 7/2023 with EF 59%, with severe LVH and diastolic dysfunction : pro-BNP > 33623, trop 78   -on bumex drip :  -cards following  -on 8/15:   -she is off bumex drip and is on midodrine   8/16  -remains off bumex  8/17:  -off diuretics  Pulmonary   -Acute hypercapnic and hypoxemic respiratory failure   -DDx: aspiration vs volume overload  VBG with respiratory acidosis pCO2 111  - CXR with small right pleural effusion, no consolidations/opacities  -now she seems better:  fio2: 30$:  -? etiology of hypercarbia:  multifactorial : seems to have OHS and TYRONE superimposed by acute chf  ? and aspiration pneumonia:"   -pt is mostly bed bound:   -it is possible that this time:  she mght need bipap on dc : atleast at the night time:   -on 8/15:   still remains intubated  8/16:  -still lethargic:  not extubated hypercarbic acidotic today on abg:  weaning trials :probably would need bipap following extubation  8/17:  -still lethargic:   -awaiting mri brain : dw    /Renal   -Hyperkalemia with EKG changes  : 7.2, hemolyzed, given insulin and D50 + calcium gluc   -K is better today : cont to monitor  -normal today on 8/15   8/16:  -stable  8/17:  -k has been ok for l ast few days  GI  NPO for now  Endocrine  T2DM : A1C 7.1 in 7/2023   -cont to monitor blood glucose  ID   - No fever/leukocytosis  - Hx latent TB which was treated, no concern for TB  : She was recently ruled out for tuberculosis at [facility]   - Started on empiric vancomycin and aztreonam (cefepime/zosyn allergy? developed rash req prednisone)  : now dced:   defer to MICU   - f/u MRSA   - follow up blood culture/urine   8/16:  -blood cultures negative so far: legionella is negative:  remains off antibiotics  staph aureus on sputum   8/17:  -off antibiotics at this time: secondary to drug eruption    dw micu and  at bedside

## 2023-08-17 NOTE — CHART NOTE - NSCHARTNOTEFT_GEN_A_CORE
Source: Patient [ X] intubated   Family [ X] spouse at bedside    other [X ] electronic chart, RN, MICU team     Diet : Diet, NPO with Tube Feed:   Tube Feeding Modality: Orogastric  Nepro with Carb Steady (NEPRORTH)  Total Volume for 24 Hours (mL): 960  Continuous  Starting Tube Feed Rate {mL per Hour}: 10  Increase Tube Feed Rate by (mL): 10  Until Goal Tube Feed Rate (mL per Hour): 40  Tube Feed Duration (in Hours): 24  Tube Feed Start Time: 23:59 (08-11-23 @ 23:55)    Nutrition follow-up note. Per chart review, 74 y/o F DM on insulin (A1c 7.1% 7/20/23), HTN, CKD, CHFpEF, breast CA, respiratory arrest and cardiac arrest (2018), CVA with residual weakness, aspiration PNA h/o trache, PEG, both removed presents with respiratory distress requiring intubation. May be volume overload i/s/o CHF/CKD vs decrease respiratory drive (given oxygen at home). Improving mental status however worsening renal function and anuria, concerning for ATN vs AIN.     Patient was previously on sedation when last seen by RD on 8/12 (refer to RD initial assessment) and recommendation for reducing goal rate to 30cc/hr [id number] + Prosource given patient was receiving non nutritive calories from propofol, however tube feeding administrated was unchanged. Now intubated and off sedation. Patient is more responsive today per spouse at bedside.    Patient maintained on Nepro with Carbsteady at goal rate of 40cc/hr [id number] provides total volume of 960mL, 1728kcal, 78g pro and 698mL free water (meeting ~33kcal/kg and 1.5g/kg of IBW). Patient is tolerating tube feeds well as confirmed with both RN and spouse at bedside. No GI distress (nausea, vomiting, diarrhea or constipation) reported. Last bowel movement today. Per RN, patient elevated blood glucose 240-291mg/dL over last 24hours. Patient on carbsteady formula, would consider insulin adjustment if trending elevated glucose level. Patient with right heel stage II pressure injury with increased nutrient needs to promote wound healing.     Weight (kg): 66.5 (08-11 @ 13:15)    Height (cm): 154.9  BMI: 27.7kg/m2  IBW: 115.5lbs/52.5kg     Weight history during admission: 8/16- 74.7kg  8/15- 74.8kg  8/13-68.6kg  8/12- 66.5kg   Weight change likely fluid related. Patient w/ 2+ generalized edema.       Pertinent Medications: albuterol/ipratropium for Nebulization  atorvastatin  carvedilol  dextrose 5%.  dextrose 5%.  dextrose 50% Injectable  dextrose 50% Injectable  dextrose 50% Injectable  glucagon  Injectable  insulin lispro (ADMELOG) corrective regimen sliding scale  insulin NPH human recombinant  sodium chloride 3%  Inhalation    Pertinent Labs:  08-17 Na134 mmol/L<L> Glu 291 mg/dL<H> K+ 3.5 mmol/L Cr  4.63 mg/dL<H> BUN 79 mg/dL<H> 08-17 Phos 3.9 mg/dL 08-14 Alb 2.5 g/dL<L>      Skin: stage II pressure injury to right heel per nursing flowsheet.   2+ generalized edema.     Estimated Needs:   [ ] no change since previous assessment  [ X] recalculated: IBW: 52.5kg     Estimated Energy Needs (25-30kcal/kg ): 1312-1575kcal/d  Estimated Protein Needs (1.2-1.5g/kg): 63-79g pro/d      Previous Nutrition Diagnosis:     [X ] Excessive Energy Intake     [ X] Increased Nutrient Needs       Additional Recommendations:    1. Continue current TF regimen as tolerated.   2. Monitor weights, labs, BM's, skin integrity, tolerance to EN.   3. Consider adjust insulin regimen as medically warranted.   4. Further adjustments to rate/volume/duration/free water provision of enteral feeds dependant on long term monitoring of patient's tolerance, weight trends and needs.  5. Reconsult nutrition services as needed.

## 2023-08-17 NOTE — PROGRESS NOTE ADULT - SUBJECTIVE AND OBJECTIVE BOX
Subjective: Patient seen and examined. No new events except as noted.   remains intubated in ICU       REVIEW OF SYSTEMS:  Unable to obtain   MEDICATIONS:  MEDICATIONS  (STANDING):  albuterol/ipratropium for Nebulization 3 milliLiter(s) Nebulizer every 6 hours  aspirin  chewable 81 milliGRAM(s) Oral daily  atorvastatin 40 milliGRAM(s) Oral at bedtime  chlorhexidine 0.12% Liquid 15 milliLiter(s) Oral Mucosa every 12 hours  chlorhexidine 2% Cloths 1 Application(s) Topical daily  dextrose 5%. 1000 milliLiter(s) (100 mL/Hr) IV Continuous <Continuous>  dextrose 5%. 1000 milliLiter(s) (50 mL/Hr) IV Continuous <Continuous>  dextrose 50% Injectable 25 Gram(s) IV Push once  dextrose 50% Injectable 12.5 Gram(s) IV Push once  dextrose 50% Injectable 25 Gram(s) IV Push once  glucagon  Injectable 1 milliGRAM(s) IntraMuscular once  heparin   Injectable 5000 Unit(s) SubCutaneous every 8 hours  insulin lispro (ADMELOG) corrective regimen sliding scale   SubCutaneous every 6 hours  insulin NPH human recombinant 8 Unit(s) SubCutaneous every 6 hours  sodium chloride 3%  Inhalation 4 milliLiter(s) Inhalation every 6 hours      PHYSICAL EXAM:  T(C): 36.7 (08-17-23 @ 08:00), Max: 37.2 (08-16-23 @ 16:00)  HR: 88 (08-17-23 @ 09:00) (74 - 106)  BP: --  RR: 17 (08-17-23 @ 09:00) (14 - 22)  SpO2: 100% (08-17-23 @ 09:00) (92% - 100%)  Wt(kg): --  I&O's Summary    16 Aug 2023 07:01  -  17 Aug 2023 07:00  --------------------------------------------------------  IN: 1020 mL / OUT: 132 mL / NET: 888 mL            Appearance: NAD, intubated, sedated	  HEENT: dry oral mucosa, L IJ AMBROCIO   Lymphatic: No lymphadenopathy  Cardiovascular: Normal S1 S2, No JVD, No murmurs, No edema  Respiratory:  Decreased BS, intubated on ventilator	  Psychiatry: A & O x 0, sedated  Gastrointestinal: Soft, Non-tender, + BS, + OGT	  Skin: No rashes, No ecchymoses, No cyanosis	  Extremities: No strength/ROM 2/2 sedation, + BL LE edema  Vascular: Peripheral pulses palpable 2+ bilaterally  +willard    Mode: AC/ CMV (Assist Control/ Continuous Mandatory Ventilation), RR (machine): 14, TV (machine): 360, FiO2: 30, PEEP: 5, MAP: 12, PIP: 43          LABS:    CARDIAC MARKERS:      aBlood Gas Profile - Arterial (08.17.23 @ 00:15)   pH, Arterial: 7.35  pCO2, Arterial: 48 mmHg  pO2, Arterial: 105 mmHg  HCO3, Arterial: 26 mmol/L  Base Excess, Arterial: 0.6 mmol/L  Oxygen Saturation, Arterial: 98.8 %  Total CO2, Arterial: 28 mmol/L  FIO2, Arterial: 30Blood Gas Arterial - Calcium, Ionized: 1.44 mmol/L (08.17.23 @ 00:15)                           8.4    17.89 )-----------( 243      ( 17 Aug 2023 00:15 )             26.7     08-17    134<L>  |  96<L>  |  79<H>  ----------------------------<  291<H>  3.5   |  24  |  4.63<H>    Ca    10.2      17 Aug 2023 00:15  Phos  3.9     08-17  Mg     3.10     08-17        TELEMETRY: 	  SR   ECG:  	  RADIOLOGY:   DIAGNOSTIC TESTING:  [ ] Echocardiogram:  [ ]  Catheterization:  [ ] Stress Test:    OTHER:

## 2023-08-17 NOTE — PROGRESS NOTE ADULT - SUBJECTIVE AND OBJECTIVE BOX
Remains on vent/off sedation - overbreathing vent/reacting no tactile/noxious stimuli  Off pressor gtts/on enteral Midodrine      VITAL:  T(C): , Max: 37.2 (08-16-23 @ 16:00)  T(F): , Max: 99 (08-16-23 @ 16:00)  HR: 92 (08-17-23 @ 11:00)  BP: 163/48  RR: 16 (08-17-23 @ 11:00)  SpO2: 100% (08-17-23 @ 11:00)  urine output 132cc/24h      PHYSICAL EXAM:  Constitutional: intubated/minimally communicative  HEENT: (+)ETT/OGT  Neck: Supple, No JVD  Respiratory: coarse BS b/l  Cardiovascular: RRR s1s2, no m/r/g  Gastrointestinal: BS+, soft, NT/ND  GI: (+)willard  Extremities: No peripheral edema b/l  Back: no CVAT b/l  Skin: No rashes, no nevi    LABS:                        8.4    17.89 )-----------( 243      ( 17 Aug 2023 00:15 )             26.7     Na(134)/K(3.5)/Cl(96)/HCO3(24)/BUN(79)/Cr(4.63)Glu(291)/Ca(10.2)/Mg(3.10)/PO4(3.9)    08-17 @ 00:15  Na(132)/K(3.7)/Cl(95)/HCO3(22)/BUN(69)/Cr(4.35)Glu(240)/Ca(9.5)/Mg(3.10)/PO4(4.0)    08-16 @ 00:08  Na(133)/K(4.1)/Cl(97)/HCO3(22)/BUN(62)/Cr(4.12)Glu(185)/Ca(8.7)/Mg(2.80)/PO4(3.8)    08-15 @ 02:06  Na(133)/K(4.2)/Cl(96)/HCO3(23)/BUN(60)/Cr(4.07)Glu(174)/Ca(8.5)/Mg(2.80)/PO4(4.4)    08-14 @ 20:50      IMPRESSION: 73F w/ HTN, DM2, CVA, breast CA-bilateral mastectomy, recurrent aspiration pneumonia/respiratory failure, and CKD, 8/11/23 p/w acute hypercapnic respiratory failure; c/b RUSS    (1)Renal - CKD4     (2)RUSS - ischemic ATN; now borderline anuric, and creatinine continuing to rise. Whereas we do not need dialysis yet, we will likely need it within the next few days if the urine output does not /if the renal parameters continue to worsen    (3)Lytes - acceptable    (4)Pulm- hypercapnic respiratory failure on admission - remains intubated    (5)ID - PNA - s/p IV abx    (6)CV - no longer hypotensive/off Midodrine    (7)AMS - awaiting MRI head    (8)GOC - full code      RECOMMEND:  (1)Coreg as needed for hypertension  (2)Dose new meds for GFR <15 (present dosing is acceptable)  (3)BMP+MG+PO4 daily  (4)No HD just yet - I will speak to the family regarding impending need for HD if we remain on the current trajectory                Jimmy Colon MD  [facility]  Office/on call physician: [phone]  Cell (7a-7p): [phone]       Remains on vent/off sedation - remains noncommunicative  Off pressor gtts/off Midodrine; hypertensive at this point   at bedside    VITAL:  T(C): , Max: 37.2 (08-16-23 @ 16:00)  T(F): , Max: 99 (08-16-23 @ 16:00)  HR: 92 (08-17-23 @ 11:00)  BP: 163/48  RR: 16 (08-17-23 @ 11:00)  SpO2: 100% (08-17-23 @ 11:00)  urine output 132cc/24h      PHYSICAL EXAM:  Constitutional: intubated/minimally communicative off sedation  HEENT: (+)ETT/OGT  Neck: Supple, No JVD  Respiratory: coarse BS b/l  Cardiovascular: RRR s1s2, no m/r/g  Gastrointestinal: BS+, soft, NT/ND  Extremities: No peripheral edema b/l  Back: no CVAT b/l  Skin: No rashes, no nevi    LABS:                        8.4    17.89 )-----------( 243      ( 17 Aug 2023 00:15 )             26.7     Na(134)/K(3.5)/Cl(96)/HCO3(24)/BUN(79)/Cr(4.63)Glu(291)/Ca(10.2)/Mg(3.10)/PO4(3.9)    08-17 @ 00:15  Na(132)/K(3.7)/Cl(95)/HCO3(22)/BUN(69)/Cr(4.35)Glu(240)/Ca(9.5)/Mg(3.10)/PO4(4.0)    08-16 @ 00:08  Na(133)/K(4.1)/Cl(97)/HCO3(22)/BUN(62)/Cr(4.12)Glu(185)/Ca(8.7)/Mg(2.80)/PO4(3.8)    08-15 @ 02:06  Na(133)/K(4.2)/Cl(96)/HCO3(23)/BUN(60)/Cr(4.07)Glu(174)/Ca(8.5)/Mg(2.80)/PO4(4.4)    08-14 @ 20:50      IMPRESSION: 73F w/ HTN, DM2, CVA, breast CA-bilateral mastectomy, recurrent aspiration pneumonia/respiratory failure, and CKD, 8/11/23 p/w acute hypercapnic respiratory failure; c/b RUSS    (1)Renal - CKD4     (2)RUSS - most likely ischemic ATN; less likely but possibly AIN. Now borderline anuric, and creatinine continuing to rise. Whereas we do not need dialysis yet, we will likely need it within the next few days if the urine output does not /if the renal parameters continue to worsen. Should we place him on steroids here for potential AIN? I am not convinced that the renal benefit would outweigh the risk of infection, etc. Renal biopsy would be warranted here to help distinguish between AIN and ATN, provided the renal function does not soon recover. He is on baby ASA - we will need to hold this for a matter of days before moving forward with the biopsy.    (3)Lytes - acceptable    (4)Pulm- hypercapnic respiratory failure on admission - remains intubated    (5)ID - PNA - s/p IV abx    (6)CV - no longer hypotensive/off Midodrine    (7)AMS - awaiting MRI head    (8)GOC - full code      RECOMMEND:  (1)Coreg as needed for hypertension  (2)Dose new meds for GFR <15 (present dosing is acceptable)  (3)BMP+MG+PO4 daily  (4)No HD just yet - potential HD within next 1-2 days f we remain on the current trajectory  (5)Favor not adding steroids for now  (6)Can d/c ASA and plan for IR-guided renal biopsy next week if renal function has not started to improve by that time      d/w'd Critical Care attending Dr. Steiner and with house staff. Also discussed with  at bedside. Counseled him regarding potential need for dialysis within next couple days, as well as the risks/benefits of dialysis. Counseled him additionally regarding option of renal biopsy, and risk/benefit of steroids here. Answered all questions to his satisfaction.          Jimmy Colon MD  [facility]  Office/on call physician: [phone]  Cell (7a-7p): [phone]

## 2023-08-17 NOTE — CHART NOTE - NSCHARTNOTEFT_GEN_A_CORE
: Bertha/Jatin    INDICATION: Respiratory Failure    PROCEDURE:  [x] LIMITED ECHO  [x] LIMITED CHEST  [ ] LIMITED RETROPERITONEAL  [ ] LIMITED ABDOMINAL  [ ] LIMITED DVT  [ ] NEEDLE GUIDANCE VASCULAR  [ ] NEEDLE GUIDANCE THORACENTESIS  [ ] NEEDLE GUIDANCE PARACENTESIS  [ ] NEEDLE GUIDANCE PERICARDIOCENTESIS  [ ] OTHER    FINDINGS:   Chest: Moderate right sided pleural effusion note. <2 B lines noted per lung field.     Cardiac: Grossly normal LV systolic function. Mild pulmonary hypertension however no PI noted. PV Acc [place] 29.3 m/s2. LVOT VTI 22.9 cm. IVC unequivocal. RV non-dilated.     INTERPRETATION: Findings consistent with right sided pleural effusion, minimal interstitial edema. Mild pHTN, adequate systolic LV function. : Bertha/Jatin    INDICATION: Respiratory Failure    PROCEDURE:  [x] LIMITED ECHO  [x] LIMITED CHEST  [ ] LIMITED RETROPERITONEAL  [ ] LIMITED ABDOMINAL  [ ] LIMITED DVT  [ ] NEEDLE GUIDANCE VASCULAR  [ ] NEEDLE GUIDANCE THORACENTESIS  [ ] NEEDLE GUIDANCE PARACENTESIS  [ ] NEEDLE GUIDANCE PERICARDIOCENTESIS  [ ] OTHER    FINDINGS:   Chest: Moderate right sided pleural effusion note. <2 B lines noted per lung field.     Cardiac: Normal GDE    INTERPRETATION: Right sided pleural effusion, mild interstitial edema. No cardiac limitation

## 2023-08-17 NOTE — PROGRESS NOTE ADULT - ASSESSMENT
74 y/o F DM on insulin, HTN, CKD,breast CA, respiratory arrest and cardiac arrest (2018), CVA with residual weakness, aspiration PNA h/o trache, PEG, both removed presents with respiratory distress requiring intubation. Had recent admission d/c 7/22/23 for cough and respiratory distress (no intubation) thought to be i/s/o aspiration PNA s/p cefepime course; developed rash in response. Infectious w/u was negative at this time. Was discharged home, daughter and  at bedside providing history. Patient admitted to MICU for higher level of care, intubated & sedated on fentanyl & propofol. While in the MICU, patient was observed to have RUE shaking. Ativan given & EEG started, pending final report. Neurology consulted for further recommendations. Patient unable to offer history at this time. At bedside, daughter &  offer collateral. As per daughter, patient follows with Dr. Garner for stroke management since 2018 and Dr. Poon for tremors. Patient's tremors usually consist of RUE or head shaking, can be stopped by the patient with effort; this has been ongoing for 2-3 years. However, as of the past 2-3 weeks, patient has been experiencing more frequent, more severe RUE shaking intermittently; during these episodes patient is awake and alert. Does not take medications for tremors. No hx of vocal tremors. No hx of seizure, incontinence, tongue bite. For memory loss, patient has been taking Donepezil. Family inquiring about taking alternative supplements instead of Donepezil. At baseline patient wheelchair/bedbound and requires assistance with ADLs. CTH obtained 8/11, no interval change noted. EEG w/o evidence of seizures. Concern for dysconjugate gaze and roving eye movements 8/15, repeat CTH unchanged but unable to visualize brainstem well. Leukocytosis increasing    Impression:   1) Worsening RUE shaking of unclear etiology at this time; no evidence of seizures, resolved   2) AMS possibly related to TME, no sz on EEG. Dysconjugate gaze and roving eye movements improved. CTH unchanged although not able to visualize brainstem well. Can consider uremic encephaloapthy as kidney function worsening/ infectious encephalopathy   3) L PCA stroke, ESUS s/p ILR, also with bilateral centrum semiovale watershed infarcts   4) Dementia     Recommendations     [] Infectious w/u  [] appreciate Nephrology recs for worsening kidney function   [] MRI brain if feasible to visualize brainstem   [] C/w home ASA 81 mg if no contraindications - ok to hold for kidney biopsy   [] C/w home Atorvastatin 10 mg qhs   [] DVT/GI ppx  [] Neurochecks q4hr   [] BP goals: Normotension   [] PT/OT when able   [] Diet: NPO w/ tube feeds    [] HgA1C goals < 7.0     D/w  at bedside and MICU team    Katty Charles DO  Vascular Neurology  Office [phone]

## 2023-08-18 NOTE — PROGRESS NOTE ADULT - SUBJECTIVE AND OBJECTIVE BOX
Subjective: Patient seen and examined. No new events except as noted.   Remains intubated in ICU.     REVIEW OF SYSTEMS:  Unable to obtain.    MEDICATIONS:  MEDICATIONS  (STANDING):  albuterol/ipratropium for Nebulization 3 milliLiter(s) Nebulizer every 6 hours  aspirin  chewable 81 milliGRAM(s) Oral daily  atorvastatin 40 milliGRAM(s) Oral at bedtime  chlorhexidine 0.12% Liquid 15 milliLiter(s) Oral Mucosa every 12 hours  chlorhexidine 2% Cloths 1 Application(s) Topical daily  dextrose 5%. 1000 milliLiter(s) (100 mL/Hr) IV Continuous <Continuous>  dextrose 5%. 1000 milliLiter(s) (50 mL/Hr) IV Continuous <Continuous>  dextrose 50% Injectable 25 Gram(s) IV Push once  dextrose 50% Injectable 12.5 Gram(s) IV Push once  dextrose 50% Injectable 25 Gram(s) IV Push once  glucagon  Injectable 1 milliGRAM(s) IntraMuscular once  heparin   Injectable 5000 Unit(s) SubCutaneous every 8 hours  insulin lispro (ADMELOG) corrective regimen sliding scale   SubCutaneous every 6 hours  insulin NPH human recombinant 8 Unit(s) SubCutaneous every 6 hours  sodium chloride 3%  Inhalation 4 milliLiter(s) Inhalation every 6 hours    PHYSICAL EXAM:  Vital Signs Last 24 Hrs  T(C): 37 (18 Aug 2023 04:00), Max: 37.3 (17 Aug 2023 16:00)  T(F): 98.6 (18 Aug 2023 04:00), Max: 99.2 (17 Aug 2023 16:00)  HR: 68 (18 Aug 2023 07:44) (68 - 105)  BP: --  BP(mean): --  RR: 18 (18 Aug 2023 07:00) (14 - 28)  SpO2: 100% (18 Aug 2023 07:44) (95% - 100%)    Parameters below as of 18 Aug 2023 07:00  Patient On (Oxygen Delivery Method): ventilator    O2 Concentration (%): 30    I&O's Summary    17 Aug 2023 07:01  -  18 Aug 2023 07:00  --------------------------------------------------------  IN: 920 mL / OUT: 200 mL / NET: 720 mL    18 Aug 2023 07:01  -  18 Aug 2023 09:24  --------------------------------------------------------  IN: 40 mL / OUT: 0 mL / NET: 40 mL    Appearance: NAD, intubated, sedated	  HEENT: dry oral mucosa, LIJ AMBROCIO   Lymphatic: No lymphadenopathy  Cardiovascular: Normal S1 S2, No JVD, No murmurs, No edema  Respiratory: Decreased BS, intubated on ventilator	  Psychiatry: A & O x 0, sedated  Gastrointestinal: Soft, Non-tender, + BS, + OGT	  Skin: No rashes, No ecchymoses, No cyanosis	  Extremities: No strength/ROM 2/2 sedation, + BL LE edema  Vascular: Peripheral pulses palpable 2+ bilaterally  +willard    Mode: AC/ CMV (Assist Control/ Continuous Mandatory Ventilation), RR (machine): 14, TV (machine): 360, FiO2: 30, PEEP: 5, MAP: 12, PIP: 43    LABS:    CARDIAC MARKERS:                        7.8    17.94 )-----------( 237      ( 18 Aug 2023 00:26 )             24.6     08-18    134<L>  |  96<L>  |  90<H>  ----------------------------<  172<H>  3.6   |  24  |  4.73<H>    Ca    10.5      18 Aug 2023 00:26  Phos  3.5     08-18  Mg     3.30     08-18    TPro  5.4<L>  /  Alb  2.3<L>  /  TBili  <0.2  /  DBili  x   /  AST  30  /  ALT  5   /  AlkPhos  211<H>  08-18    TELEMETRY:  SR   ECG:  	  RADIOLOGY:   DIAGNOSTIC TESTING:  [ ] Echocardiogram:  [ ]  Catheterization:  [ ] Stress Test:    OTHER:

## 2023-08-18 NOTE — PROGRESS NOTE ADULT - SUBJECTIVE AND OBJECTIVE BOX
Date of Service: 08-18-23 @ 13:33    Patient is a 73y old  Female who presents with a chief complaint of Respiratory distress (18 Aug 2023 11:47)      Any change in ROS: seems same: on full vent suport:  on 30% fio2:  still lethargic:  family at bedside    MEDICATIONS  (STANDING):  albuterol/ipratropium for Nebulization 3 milliLiter(s) Nebulizer every 6 hours  atorvastatin 40 milliGRAM(s) Oral at bedtime  carvedilol 12.5 milliGRAM(s) Oral every 12 hours  chlorhexidine 0.12% Liquid 15 milliLiter(s) Oral Mucosa every 12 hours  chlorhexidine 2% Cloths 1 Application(s) Topical daily  dextrose 5%. 1000 milliLiter(s) (100 mL/Hr) IV Continuous <Continuous>  dextrose 5%. 1000 milliLiter(s) (50 mL/Hr) IV Continuous <Continuous>  dextrose 50% Injectable 25 Gram(s) IV Push once  dextrose 50% Injectable 12.5 Gram(s) IV Push once  dextrose 50% Injectable 25 Gram(s) IV Push once  diphenhydrAMINE Elixir 25 milliGRAM(s) Oral once  glucagon  Injectable 1 milliGRAM(s) IntraMuscular once  heparin   Injectable 5000 Unit(s) SubCutaneous every 8 hours  insulin lispro (ADMELOG) corrective regimen sliding scale   SubCutaneous every 6 hours  insulin NPH human recombinant 12 Unit(s) SubCutaneous every 6 hours  sodium chloride 3%  Inhalation 4 milliLiter(s) Inhalation every 6 hours    MEDICATIONS  (PRN):  acetaminophen   Oral Liquid .. 650 milliGRAM(s) Oral every 6 hours PRN Temp greater or equal to 38C (100.4F), Mild Pain (1 - 3)  dextrose Oral Gel 15 Gram(s) Oral once PRN Blood Glucose LESS THAN 70 milliGRAM(s)/deciliter    Vital Signs Last 24 Hrs  T(C): 36.7 (18 Aug 2023 12:00), Max: 37.3 (17 Aug 2023 16:00)  T(F): 98.1 (18 Aug 2023 12:00), Max: 99.2 (17 Aug 2023 16:00)  HR: 71 (18 Aug 2023 12:00) (68 - 105)  BP: 115/52 (18 Aug 2023 12:00) (115/52 - 151/33)  BP(mean): 61 (18 Aug 2023 12:00) (61 - 61)  RR: 16 (18 Aug 2023 12:00) (14 - 28)  SpO2: 99% (18 Aug 2023 12:00) (96% - 100%)    Parameters below as of 18 Aug 2023 12:00  Patient On (Oxygen Delivery Method): ventilator    O2 Concentration (%): 30  Mode: AC/ CMV (Assist Control/ Continuous Mandatory Ventilation)  RR (machine): 16  TV (machine): 360  FiO2: 30  PEEP: 5  ITime: 0.8  MAP: 12  PIP: 38    I&O's Summary    17 Aug 2023 07:01  -  18 Aug 2023 07:00  --------------------------------------------------------  IN: 920 mL / OUT: 200 mL / NET: 720 mL    18 Aug 2023 07:01  -  18 Aug 2023 13:33  --------------------------------------------------------  IN: 200 mL / OUT: 0 mL / NET: 200 mL          Physical Exam:   GENERAL: NAD, well-groomed, well-developed  HEENT: MANDY/   Atraumatic, Normocephalic  ENMT: No tonsillar erythema, exudates, or enlargement; Moist mucous membranes, Good dentition, No lesions  NECK: Supple, No JVD, Normal thyroid  CHEST/LUNG: Clear to auscultaion, ; No rales, rhonchi, wheezing, or rubs  CVS: Regular rate and rhythm; No murmurs, rubs, or gallops  GI: : Soft, Nontender, Nondistended; Bowel sounds present  NERVOUS SYSTEM:  letahrgic : intubated off sedation  EXTREMITIES:  mild edema  LYMPH: No lymphadenopathy noted  SKIN: No rashes or lesions  ENDOCRINOLOGY: No Thyromegaly  PSYCH: intubated andlethargic    Labs:  ABG - ( 18 Aug 2023 05:15 )  pH, Arterial: 7.35  pH, Blood: x     /  pCO2: 48    /  pO2: 73    / HCO3: 26    / Base Excess: 0.7   /  SaO2: 96.4            28, 29                            7.8    17.94 )-----------( 237      ( 18 Aug 2023 00:26 )             24.6                         8.4    17.89 )-----------( 243      ( 17 Aug 2023 00:15 )             26.7                         8.5    16.25 )-----------( 232      ( 16 Aug 2023 00:08 )             26.3                         8.2    13.88 )-----------( 214      ( 15 Aug 2023 12:00 )             25.4                         6.5    11.69 )-----------( 196      ( 15 Aug 2023 02:06 )             20.9     08-18    134<L>  |  96<L>  |  90<H>  ----------------------------<  172<H>  3.6   |  24  |  4.73<H>  08-17    134<L>  |  96<L>  |  79<H>  ----------------------------<  291<H>  3.5   |  24  |  4.63<H>  08-16    132<L>  |  95<L>  |  69<H>  ----------------------------<  240<H>  3.7   |  22  |  4.35<H>  08-15    133<L>  |  97<L>  |  62<H>  ----------------------------<  185<H>  4.1   |  22  |  4.12<H>  08-14    133<L>  |  96<L>  |  60<H>  ----------------------------<  174<H>  4.2   |  23  |  4.07<H>    Ca    10.5      18 Aug 2023 00:26  Ca    10.2      17 Aug 2023 00:15  Phos  3.5     08-18  Phos  3.9     08-17  Mg     3.30     08-18  Mg     3.10     08-17    TPro  5.4<L>  /  Alb  2.3<L>  /  TBili  <0.2  /  DBili  x   /  AST  30  /  ALT  5   /  AlkPhos  211<H>  08-18  TPro  5.7<L>  /  Alb  2.5<L>  /  TBili  <0.2  /  DBili  x   /  AST  18  /  ALT  9   /  AlkPhos  117  08-14    CAPILLARY BLOOD GLUCOSE      POCT Blood Glucose.: 213 mg/dL (18 Aug 2023 11:40)  POCT Blood Glucose.: 246 mg/dL (18 Aug 2023 05:09)  POCT Blood Glucose.: 177 mg/dL (18 Aug 2023 00:22)  POCT Blood Glucose.: 302 mg/dL (17 Aug 2023 17:40)      LIVER FUNCTIONS - ( 18 Aug 2023 00:26 )  Alb: 2.3 g/dL / Pro: 5.4 g/dL / ALK PHOS: 211 U/L / ALT: 5 U/L / AST: 30 U/L / GGT: x             Urinalysis Basic - ( 18 Aug 2023 00:26 )    Color: x / Appearance: x / SG: x / pH: x  Gluc: 172 mg/dL / Ketone: x  / Bili: x / Urobili: x   Blood: x / Protein: x / Nitrite: x   Leuk Esterase: x / RBC: x / WBC x   Sq Epi: x / Non Sq Epi: x / Bacteria: x            RECENT CULTURES:  08-16 @ 20:46 .Blood Blood-Peripheral                No growth at 24 hours    08-16 @ 20:13 .Blood Blood-Peripheral                No growth at 24 hours    08-16 @ 15:00 .Sputum Sputum       Few polymorphonuclear leukocytes per low power field  Rare Squamous epithelial cells per low power field  Few Gram Positive Cocci in Clusters per oil power field           Normal Respiratory Cate present    08-11 @ 17:50 .Sputum Sputum   SIMON    Rare polymorphonuclear leukocytes per low power field  Rare Squamous epithelial cells per low power field  Numerous Gram positive cocci in pairs per oil power field    Staphylococcus aureus  Staphylococcus aureus     Numerous Staphylococcus aureus    rad< from: Xray Chest 1 View- PORTABLE-Urgent (Xray Chest 1 View- PORTABLE-Urgent .) (08.13.23 @ 07:09) >    ACC: [id number] EXAM:  XR CHEST PORTABLE URGENT 1V   ORDERED BY: NATTY ACKERMAN     PROCEDURE DATE:  08/13/2023          INTERPRETATION:  CLINICAL INFORMATION: Central line placement    TIME OF EXAMINATION: August 13 at 6:59 AM    EXAM: Portablechest    FINDINGS:  Since the last study, a left IJ line has been placed and its tip is at   the origin of the SVC. No complicating pneumothorax.    Endotracheal tube with tip just above the ines and enteric tube in   place.    Vascular congestion with right pleural thickening unchanged.    Heart is not enlarged with a loop recorder. No effusions visualized.        COMPARISON: August 12        IMPRESSION: Status post left IJ central line placement.    --- End of Report ---            LIZA WOODY MD; Attending Radiologist  This document has been electronically signed. Aug 13 2023 10:08AM    < end of copied text >  < from: MR Head No Cont (08.17.23 @ 19:58) >  evidence of acute intracranial hemorrhage. Chronic left PCA territory   infarct with associated ex vacuo dilatation of the occipital horn of the   left lateral ventricle. Ventricles and sulci are prominent consistent   with age-related parenchymal volume loss. No midline shift or other   significant mass effect is noted. Gray-white differentiation is   maintained throughout. Normal flow voids are maintained in the dominant   arterial and venous structures. There are patchy and confluent foci of   hyperintense T2 signal within the subcortical and periventricular white   matter which are nonspecific but likely related to chronic microvascular   ischemic disease.    Moderate mucosal thickening of the paranasal sinuses. The tympanomastoid   spaces are clear. Orbits and orbital contents are unremarkable.    IMPRESSION:    MOTION LIMITED STUDY. ACUTE INFARCT INVOLVING THE RIGHT CEREBELLUM. THERE   IS NO EVIDENCE OF ACUTE INTRACRANIAL HEMORRHAGE.    --- End of Report ---            LUCIA PHAM MD; Attending Radiologist  This document has been electronically signed. Aug 17 2023  8:41PM    < end of copied text >        RESPIRATORY CULTURES:          Studies  Chest X-RAY  CT SCAN Chest   Venous Dopplers: LE:   CT Abdomen  Others

## 2023-08-18 NOTE — PROGRESS NOTE ADULT - ASSESSMENT
72 y/o F well known to me from my [facility]. she was admitted at [facility] 7/12-7/22 w aspiration PNA, was treated w CEFEPIME, developed an allergic rash,  dCHF, + MAC on AFB culture, had been progressively getting more and more lethargic and dyspneic at home since DC.   In  am of 8/11/23  ptn presented with respiratory distress w hypoxia and hypercarbia requiring intubation 2/2 volume overload +/- Asp PNA      Neuro   off sedation  well known to Dr. Bianchi, consult reviewed   Baseline MS AOx3, aphasic   - h/o CVA , on aspirin and statin . resumed w feeding tube  - eeg  2/2 tremors, no sz focus  - starting to become more responsive but not triggering the vent  - MRI 8/17:  new R cerebellar infarct, old left PCA/Occipital infarct. probably embolic in nature, did not tolerate full AC in the past. pending STEPHANIE.       Cardiac   Ptn well known to Dr. Dunn, consult reviewed   CHFpEF   TTE 7/2023 with EF 59%, with severe LVH and diastolic dysfunction   pro-BNP > 35364, trop 78       Pulmonary   Acute hypercapnic and hypoxemic respiratory failure   well known to Dr. Mcnulty, consult reviewed   DDx: aspiration vs volume overload   VBG with respiratory acidosis pCO2 111  CT chest: mod ( worsening) R pl effusion  - cefepime DCed on 8/13, started on Moxifloxacin on 8/13, on 8/14 off ABx    Renal   Hyperkalemia with EKG changes  , initially treated w LOKELMA,  now resolved   Ptn well know to Dr. Colon, consult reviewed   7.2, hemolyzed,      worsening renal function w oliguria, may need to initiate HD 8/19.  hasn't consented yet.   may need a renal biopsy 8/22    GI  NPO for now, on tube feeds  would start GI ppx w IV PPI    Endocrine  T2DM   A1C 7.1 in 7/2023   - BG goal 140-200     ID   No fever/leukocytosis, recheck temp   Hx latent TB which was treated, no concern for TB   - grew MAC on AFB culture from most recent admission. would call ID consult  Started on empiric vancomycin and aztreonam,  changed to cefepime 8/12, cefepime dced on 8/13(cefepime/zosyn allergy.  developed rash when on cefepime on previous admission ) . started on AVALOX on 8/13, off ABx on 8/14. ptn has an allergic rash, prob 2/2 cefepime  - f/u MRSA   - all cxs NTD    Heme/Onc  ppx: heparin q8 hrs     Ethics  GOC - Discussed GOC with daughter and , they have opted in the past for full code. and she remains full code at present

## 2023-08-18 NOTE — PROGRESS NOTE ADULT - ASSESSMENT
74 y/o F DM on insulin, HTN, CKD,breast CA, respiratory arrest and cardiac arrest (2018), CVA with residual weakness, aspiration PNA h/o trache, PEG, both removed presents with respiratory distress requiring intubation. Had recent admission d/c 7/22/23 for cough and respiratory distress (no intubation) thought to be i/s/o aspiration PNA s/p cefepime course; developed rash in response. Infectious w/u was negative at this time. Was discharged home, daughter and  at bedside providing history. Patient admitted to MICU for higher level of care, intubated & sedated on fentanyl & propofol. While in the MICU, patient was observed to have RUE shaking. Ativan given & EEG started, pending final report. Neurology consulted for further recommendations. Patient unable to offer history at this time. At bedside, daughter &  offer collateral. As per daughter, patient follows with Dr. Garner for stroke management since 2018 and Dr. Poon for tremors. Patient's tremors usually consist of RUE or head shaking, can be stopped by the patient with effort; this has been ongoing for 2-3 years. However, as of the past 2-3 weeks, patient has been experiencing more frequent, more severe RUE shaking intermittently; during these episodes patient is awake and alert. Does not take medications for tremors. No hx of vocal tremors. No hx of seizure, incontinence, tongue bite. For memory loss, patient has been taking Donepezil. Family inquiring about taking alternative supplements instead of Donepezil. At baseline patient wheelchair/bedbound and requires assistance with ADLs. CTH obtained 8/11, no interval change noted. EEG w/o evidence of seizures. Concern for dysconjugate gaze and roving eye movements 8/15, repeat CTH unchanged but unable to visualize brainstem well. Leukocytosis increasing    Impression:   1) Worsening RUE shaking of unclear etiology at this time; no evidence of seizures, resolved   2) AMS possibly related to TME, no sz on EEG. Dysconjugate gaze and roving eye movements improved. CTH unchanged although not able to visualize brainstem well. Can consider uremic encephaloapthy as kidney function worsening/ infectious encephalopathy   3) L PCA stroke, ESUS s/p ILR, also with bilateral centrum semiovale watershed infarcts   4) Dementia     Recommendations     [] Infectious w/u  [] appreciate Nephrology recs for worsening kidney function   [] MRI brain if feasible to visualize brainstem   [] C/w home ASA 81 mg if no contraindications - ok to hold for kidney biopsy   [] C/w home Atorvastatin 10 mg qhs   [] DVT/GI ppx  [] Neurochecks q4hr   [] BP goals: Normotension   [] PT/OT when able   [] Diet: NPO w/ tube feeds    [] HgA1C goals < 7.0     D/w  at bedside and MICU team    Katty Charles DO  Vascular Neurology  Office [phone]       72 y/o F DM on insulin, HTN, CKD,breast CA, respiratory arrest and cardiac arrest (2018), CVA with residual weakness, aspiration PNA h/o trache, PEG, both removed presents with respiratory distress requiring intubation. Had recent admission d/c 7/22/23 for cough and respiratory distress (no intubation) thought to be i/s/o aspiration PNA s/p cefepime course; developed rash in response. Infectious w/u was negative at this time. Was discharged home, daughter and  at bedside providing history. Patient admitted to MICU for higher level of care, intubated & sedated on fentanyl & propofol. While in the MICU, patient was observed to have RUE shaking. Ativan given & EEG started, pending final report. Neurology consulted for further recommendations. Patient unable to offer history at this time. At bedside, daughter &  offer collateral. As per daughter, patient follows with Dr. Garner for stroke management since 2018 and Dr. Poon for tremors. Patient's tremors usually consist of RUE or head shaking, can be stopped by the patient with effort; this has been ongoing for 2-3 years. However, as of the past 2-3 weeks, patient has been experiencing more frequent, more severe RUE shaking intermittently; during these episodes patient is awake and alert. Does not take medications for tremors. No hx of vocal tremors. No hx of seizure, incontinence, tongue bite. For memory loss, patient has been taking Donepezil. Family inquiring about taking alternative supplements instead of Donepezil. At baseline patient wheelchair/bedbound and requires assistance with ADLs. CTH obtained 8/11, no interval change noted. EEG w/o evidence of seizures. Concern for dysconjugate gaze and roving eye movements 8/15, repeat CTH unchanged but unable to visualize brainstem well. Leukocytosis increasing    Impression:   1) Worsening RUE shaking of unclear etiology at this time; no evidence of seizures, resolved   2) AMS possibly related to TME, no sz on EEG. Dysconjugate gaze and roving eye movements improved. CTH unchanged although not able to visualize brainstem well. Can consider uremic encephaloapthy as kidney function worsening/ infectious encephalopathy   3) L PCA stroke, ESUS s/p ILR, also with bilateral centrum semiovale watershed infarcts   4) New Embolic strokes seen on MRI 8/17 - R cerebellum, midbrain, multiple other tiny embolic infarcts. Central midbrain stroke may be responsible for inability to trigger vent   5) Dementia     Recommendations     [] Infectious w/u - consider getting ID consult  [] appreciate Nephrology recs for worsening kidney function - planned for kidney biopsy on Tuesday   [] C/w home ASA 81 mg if no contraindications - ok to hold for kidney biopsy   [] C/w home Atorvastatin 10 mg qhs   [] consider further embolic w/u if within GOC and consider STEPHANIE while intubated  [] would interrogate ILR and review tele to see if pAF  [] DVT/GI ppx  [] Neurochecks q4hr   [] BP goals: Normotension   [] PT/OT when able   [] Diet: NPO w/ tube feeds    [] HgA1C goals < 7.0     D/w  and daughter at bedside and MICU team    Katty Charles DO  Vascular Neurology  Office [phone]

## 2023-08-18 NOTE — PROGRESS NOTE ADULT - ATTENDING COMMENTS
Patient examined and case reviewed in detail on bedside rounds  Critically ill and unstable on vent/pressors with new cerebellar CVA for STEPHANIE to check for cardioembolic source of CVA Skin rash is improved RUSS without improvement May need HD AIN remains a concern with plan for renal bx off of aspirin   Frequent bedside visits with therapy change today.   I have personally provided 35+ minutes of critical care time concurrently with the resident/fellow; this excludes time spent on separate procedures.  I have personally provided 75+ minutes of critical care time concurrently with the resident/fellow; this excludes time spent on separate procedures.

## 2023-08-18 NOTE — PROGRESS NOTE ADULT - ASSESSMENT
72 y/o F DM on insulin, HTN, CKD, CHFpEF, breast CA, respiratory arrest and cardiac arrest (2018), CVA with residual weakness, aspiration PNA h/o trache, PEG, both removed presents with respiratory distress requiring intubation. May be volume overload i/s/o CHF/CKD vs decrease respiratory drive (given oxygen at home). Improving mental status however worsening renal function and anuria, concerning for ATN vs AIN.     Neuro   #AMS  #Metabolic encephalopathy   Baseline MS AOx3   CT head without acute change  Previously on prop and fentanyl which were weaned, now MS slightly improved - following commands and slightly responsive to pain, still not triggering vent   Spoke with neurology, not inclined to get LP   EEG without epileptiform activity   Appears to have disconjugate eyes, CT head without acute changes   - MRI brain showed right cerebellar infarct (new) with old left PCA/occipital infarcts    #CVA   - holding ASA for possible kidney biopsy   - c/w statin     Cardiac   #Shock  - Likely mixed septic and vasoplegic from prop  - weaned vaso and levo, now HDS  - d/c midodrine for hypertension  - started coreg 6.125 BID     #CHFpEF   TTE 7/2023 with EF 59%, with severe LVH and diastolic dysfunction   pro-BNP > 49271, trop 78   Repeat TTE with EF 60% normal systolic and grade 1 diastolic dysfunction   - c/w coreg    Pulmonary   #Acute hypercapnic and hypoxemic respiratory failure   DDx: aspiration vs volume overload vs sedating medication decreasing drive (was given nyquil)   VBG with respiratory acidosis pCO2 111  CXR with small right pleural effusion, no consolidations/opacities  - c/w mechanical ventilation   - monitor blood gas while on vent      /Renal   #RUSS on CKD   Ddx: obstructive (willard in, no hydro on POCUS) vs low flow state vs AIN i/s/o cephalosporin use and drug rash   Urinalysis with 7 WBC, would not pursue steroids at this time per nephro  Worsening creatinine still but no urgent indication for HD, ATN   - kidney biopsy to r/o AIN   - trialed diuretics without improvement   - Continue to monitor, dose meds per eGFR. avoid nephrotoxic agents    #Hyperkalemia with EKG changes    7.2, hemolyzed, given insulin and D50 + calcium gluc   Likely i/s/o renal dysfunction   Received lasix 80 and 40 IV initially but without adequate response   Given bumex 2mg the morning of 8/12  Per nephro, started on bumex gtt with worsening creatinine   - decreasing urine output, now oliguric   - off bumex gtt       GI  Diet: NPO with tube feeds     Endocrine  #T2DM   A1C 7.1 in 7/2023   - c/w NPH to 8U q6 with mod SS   - BG goal 140-200     ID   No fever/leukocytosis, recheck temp   Hx latent TB which was treated, no concern for TB   Started on empiric vancomycin and aztreonam (cefepime/zosyn allergy? developed rash req prednisone)   Trialed cefepime --> cefazolin (sputum MSSA), developed drug eruption - discontinued   Blood and urine cultures 8/11 demonstrating no growth currently  Rising leukocytosis with low grade temp, pt with A-line concern for CLABSI?   - start empiric antibx if febrile > 101  - pending repeat BC and UC    Heme/Onc  ppx: heparin q8 hrs     Ethics  GOC - Per previous GOC with daughter and , reported that they have not talked about end of life care with the patient. For now, they wanted "everything to be done" including CPR, continuing with mech ventilation/intubation, pressors   72 y/o F DM on insulin, HTN, CKD, CHFpEF, breast CA, respiratory arrest and cardiac arrest (2018), CVA with residual weakness, aspiration PNA h/o trache, PEG, both removed presents with respiratory distress requiring intubation. May be volume overload i/s/o CHF/CKD vs decrease respiratory drive (given oxygen at home). Improving mental status however worsening renal function and anuria, concerning for ATN vs AIN.     Neuro   #AMS  #Metabolic encephalopathy   Baseline MS AOx3   CT head without acute change  Previously on prop and fentanyl which were weaned, now MS slightly improved - following commands and slightly responsive to pain, still not triggering vent   Spoke with neurology, not inclined to get LP   EEG without epileptiform activity   Appears to have disconjugate eyes, CT head without acute changes   MRI brain showed right cerebellar infarct (new) with old left PCA/occipital infarcts, likely embolic in nature - previous TTE without bubble study   - will pursue STEPHANIE  - avoid hypertension with BP < 160 systolic     #CVA   - holding ASA for possible kidney biopsy   - c/w statin     Cardiac   #Shock  - Likely mixed septic and vasoplegic from prop  - weaned vaso and levo, now HDS  - d/c midodrine for hypertension  - started coreg 6.125 BID     #CHFpEF   TTE 7/2023 with EF 59%, with severe LVH and diastolic dysfunction   pro-BNP > 44929, trop 78   Repeat TTE with EF 60% normal systolic and grade 1 diastolic dysfunction   - c/w coreg    Pulmonary   #Acute hypercapnic and hypoxemic respiratory failure   DDx: aspiration vs volume overload vs sedating medication decreasing drive (was given nyquil)   VBG with respiratory acidosis pCO2 111  CXR with small right pleural effusion, no consolidations/opacities  - c/w mechanical ventilation   - monitor blood gas while on vent      /Renal   #RUSS on CKD   Ddx: obstructive (willard in, no hydro on POCUS) vs low flow state vs AIN i/s/o cephalosporin use and drug rash   Urinalysis with 7 WBC, would not pursue steroids at this time per nephro  Worsening creatinine still but no urgent indication for HD, ATN   - kidney biopsy to r/o AIN   - trialed diuretics without improvement   - Continue to monitor, dose meds per eGFR. avoid nephrotoxic agents    #Hyperkalemia with EKG changes    7.2, hemolyzed, given insulin and D50 + calcium gluc   Likely i/s/o renal dysfunction   Received lasix 80 and 40 IV initially but without adequate response   Given bumex 2mg the morning of 8/12  Per nephro, started on bumex gtt with worsening creatinine   - decreasing urine output, now oliguric   - off bumex gtt       GI  Diet: NPO with tube feeds     Endocrine  #T2DM   A1C 7.1 in 7/2023   - c/w NPH to 8U q6 with mod SS   - BG goal 140-200     ID   No fever/leukocytosis, recheck temp   Hx latent TB which was treated, no concern for TB   Started on empiric vancomycin and aztreonam (cefepime/zosyn allergy? developed rash req prednisone)   Trialed cefepime --> cefazolin (sputum MSSA), developed drug eruption - discontinued   Blood and urine cultures 8/11 demonstrating no growth currently  Rising leukocytosis with low grade temp, pt with A-line concern for CLABSI?   - start empiric antibx if febrile > 101  - pending repeat BC and UC    Heme/Onc  ppx: heparin q8 hrs     Ethics  GOC - Per previous GOC with daughter and , reported that they have not talked about end of life care with the patient. For now, they wanted "everything to be done" including CPR, continuing with mech ventilation/intubation, pressors   72 y/o F DM on insulin, HTN, CKD, CHFpEF, breast CA, respiratory arrest and cardiac arrest (2018), CVA with residual weakness, aspiration PNA h/o trache, PEG, both removed presents with respiratory distress requiring intubation. May be volume overload i/s/o CHF/CKD vs decrease respiratory drive (given oxygen at home). Improving mental status however worsening renal function and anuria, concerning for ATN vs AIN.     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 No Hx Afib   - will pursue STEPHANIE  - avoid hypertension with BP < 180 systolic   - holding ASA for possible kidney biopsy planned for 8/22   - c/w statin     Cardiac   #Shock  - Likely mixed septic and vasoplegic from prop  - weaned vaso and levo, now HDS  - d/c midodrine for hypertension  - started coreg 612.5 BID     #CHFpEF   TTE 7/2023 with EF 59%, with severe LVH and diastolic dysfunction   pro-BNP > 98178, trop 78   Repeat TTE with EF 60% normal systolic and grade 1 diastolic dysfunction   - c/w coreg    Pulmonary   #Acute hypercapnic and hypoxemic respiratory failure   DDx: aspiration vs volume overload vs sedating medication decreasing drive (was given nyquil)   VBG with respiratory acidosis pCO2 111  CXR with small right pleural effusion, no consolidations/opacities  - c/w mechanical ventilation   - monitor blood gas while on vent      /Renal   #RUSS on CKD   Ddx: obstructive (willard in, no hydro on POCUS) vs low flow state vs AIN i/s/o cephalosporin use and drug rash   Urinalysis with 7 WBC, would not pursue steroids at this time per nephro  Worsening creatinine still but no urgent indication for HD, ATN   - kidney biopsy to r/o AIN   - trialed diuretics without improvement   - Continue to monitor, dose meds per eGFR. avoid nephrotoxic agents    #Hyperkalemia with EKG changes    7.2, hemolyzed, given insulin and D50 + calcium gluc   Likely i/s/o renal dysfunction   Received lasix 80 and 40 IV initially but without adequate response   Given bumex 2mg the morning of 8/12  Per nephro, started on bumex gtt with worsening creatinine   - decreasing urine output, now oliguric   - off bumex gtt       GI  Diet: NPO with tube feeds     Endocrine  #T2DM   A1C 7.1 in 7/2023   - c/w NPH to 8U q6 with mod SS   - BG goal 140-200     ID   No fever/leukocytosis, recheck temp   Hx latent TB which was treated, no concern for TB   Started on empiric vancomycin and aztreonam (cefepime/zosyn allergy? developed rash req prednisone)   Trialed cefepime --> cefazolin (sputum MSSA), developed drug eruption - discontinued   Blood and urine cultures 8/11 demonstrating no growth currently  Rising leukocytosis with low grade temp  - start empiric antibx if febrile > 101  - BC NGTD  - willard removed     Heme/Onc  ppx: heparin q8 hrs     Ethics  GOC - Per previous GOC with daughter and , reported that they have not talked about end of life care with the patient. For now, they wanted "everything to be done" including CPR, continuing with mech ventilation/intubation, pressors

## 2023-08-18 NOTE — CHART NOTE - NSCHARTNOTEFT_GEN_A_CORE
Procedure: Transesophageal Echocardiogram  Indication:  CVA  Consent: Written  Operators: Alek Steiner Kamper  Anesthesia:  propofol 50 mcg, fentanyl 100 mcg x2, versed 4 mg x1 (for hypertension), cisatracurium 20 mg IVPB x1  Preparation:  Airway secured, patient on FiO2 1.0  Technique:  First pass insertion under VL control.  Findings:     AV:  Normal trileaflet with two linear mobile echogenic elements attached to valve leaflets consistent with Lambels excrescence.  No aortic regurgitation.    MV:  Normal anatomy, no mitral regurgitation.      PV:  Not visualized.     TV:  Normal anatomy.  No tricuspid regurgitation.     LV:  Normal LV function without systolic wall motion abnormalities    RV:  Normal size and function    LA:  No thrombus. Left atrial appendage no thrombus.  Constant bowing of interatrial septum towards right atrium,    RA:  Normal anatomy.    SVC:  No respirophasic variation.     Aorta:  Mild calcific ASD changes in arch.  No mobile ASD elements seen.    PA:  Normal.      Doppler:  Note:       Other:     Assessment and Plan: Procedure: Transesophageal Echocardiogram  Indication:  CVA  Consent: Written  Operators: Alek Steiner Kamper  Anesthesia:  propofol 50 mcg, fentanyl 100 mcg x2, versed 4 mg x1 (for hypertension), cisatracurium 20 mg IVPB x1  Preparation:  Airway secured, patient on FiO2 1.0  Technique:  First pass insertion under VL control.  Findings:     AV:  Normal trileaflet with two linear mobile echogenic elements attached to valve leaflets consistent with Lambel's excrescence.  No aortic regurgitation.    MV:  Normal anatomy, no mitral regurgitation.      PV:  Not visualized.     TV:  Normal anatomy.  No tricuspid regurgitation.     LV:  Normal LV function without systolic wall motion abnormalities    RV:  Normal size and function    LA:  No thrombus. Left atrial appendage no thrombus.  Constant bowing of interatrial septum towards right atrium,    RA:  Normal anatomy.    SVC:  No respirophasic variation.     Aorta:  Mild calcific ASD changes in arch.  No mobile ASD elements seen.    PA:  Normal.      Doppler:  Note: E/E' 21.91.  E' 0.05 m/s LVOT Vti 21.8 cm MPA AT 77 ms TRINITY peak velocity 55 cm/s.  Portal venous velocity pattern greater than 50% pulsatility.      Other:  Right sided pleural effusion.  Small non-translobar consolidation, LLL medial segment.  Scattered B-lines, left posterior lung.     Assessment and Plan:  No source for embolism in reference to patient's acute cerebellar CVA.  Lambel's excrescence of uncertain clinical implication.  No volume responsiveness.  TRINITY pressure elevated (E/E' >14) and TRINITY interatrial septum constantly bowed towards right atrium.  No evidence of intracardiac shunt but this may represent a false negative as left atrial pressure is elevated above RAA pressure.    Karri Moss PGY6  [id number]

## 2023-08-18 NOTE — PROGRESS NOTE ADULT - ASSESSMENT
74 y/o F DM on insulin, HTN, CKD, CHFpEF, breast CA, respiratory arrest and cardiac arrest (2018), CVA with residual weakness, aspiration PNA h/o trache, PEG, both removed presents with respiratory distress requiring intubation. Found to have elevated BNP, may be 2/2 to volume overload i/s/o CKD vs CHF.     Neuro ;  - Sedated : Baseline MS AOx3   -WAS ON PROPOFOL AND FENTANYL BEFORE: NOW OFF:    - Monitor her mental status:  requiring only 30% fio2:    -8/15:  - now she has been off sedation for more then 24 hours but is still lethargic: her o2 requirement has not increased:   -for possible extubation if she wakes up   8/16:  -dw PMD: pt is still lethargic  for MRI brain stem today   8/17"  -had ct chead: OK: awaiting MRI brain : still lethargic  8/18:  seemed same to me: family at bedside who says she is lttle  bit more awake:   -oxygen requirement is same:  at 30%:  -off vasopressors   CVA   - cont aspirin and statin   Cardiac   -CHFpEF : TTE 7/2023 with EF 59%, with severe LVH and diastolic dysfunction : pro-BNP > 93843, trop 78   -on bumex drip :  -cards following  -on 8/15:   -she is off bumex drip and is on midodrine   8/16  -remains off bumex  8/17:  8/18:  -off bumex:  defer to MICU team   -off diuretics  Pulmonary   -Acute hypercapnic and hypoxemic respiratory failure   -DDx: aspiration vs volume overload  VBG with respiratory acidosis pCO2 111  - CXR with small right pleural effusion, no consolidations/opacities  -now she seems better:  fio2: 30$:  -? etiology of hypercarbia:  multifactorial : seems to have OHS and TYRONE superimposed by acute chf  ? and aspiration pneumonia:"   -pt is mostly bed bound:   -it is possible that this time:  she mght need bipap on dc : atleast at the night time:   -on 8/15:   still remains intubated  8/16:  -still lethargic:  not extubated hypercarbic acidotic today on abg:  weaning trials :probably would need bipap following extubation  8/17:  -still lethargic:   -awaiting mri brain : dw    8/18:  -MRI done has new right cerebellar infarct on mri : defer to MICU   /Renal   -Hyperkalemia with EKG changes  : 7.2, hemolyzed, given insulin and D50 + calcium gluc   -K is better today : cont to monitor  -normal today on 8/15   8/16:  -stable  8/17:  -k has been ok for l ast few days  8/18: stable  GI  NPO for now  Endocrine  T2DM : A1C 7.1 in 7/2023   -cont to monitor blood glucose  ID   - No fever/leukocytosis  - Hx latent TB which was treated, no concern for TB  : She was recently ruled out for tuberculosis at [facility]   - Started on empiric vancomycin and aztreonam (cefepime/zosyn allergy? developed rash req prednisone)  : now dced:   defer to MICU   - f/u MRSA   - follow up blood culture/urine   8/16:  -blood cultures negative so far: legionella is negative:  remains off antibiotics  staph aureus on sputum   8/17:  -off antibiotics at this time: secondary to drug eruption  8/18:  -remains off antibiotics     dw micu and  at bedside

## 2023-08-18 NOTE — PROGRESS NOTE ADULT - SUBJECTIVE AND OBJECTIVE BOX
Patient is a 73y old  Female who presents with a chief complaint of Respiratory distress (18 Aug 2023 13:33)      SUBJECTIVE / OVERNIGHT EVENTS: remains intubated in MICU, more responsive, new R cerebellar infarct, old left PCA/Occipital infarct. probably embolic in nature, did not tolerate full AC in the past. pending STEPHANIE. worsening renal function w oliguria, may need to initiate HD 8/19.  hasn't consented yet.     MEDICATIONS  (STANDING):  albuterol/ipratropium for Nebulization 3 milliLiter(s) Nebulizer every 6 hours  atorvastatin 40 milliGRAM(s) Oral at bedtime  carvedilol 12.5 milliGRAM(s) Oral every 12 hours  chlorhexidine 0.12% Liquid 15 milliLiter(s) Oral Mucosa every 12 hours  chlorhexidine 2% Cloths 1 Application(s) Topical daily  dextrose 5%. 1000 milliLiter(s) (100 mL/Hr) IV Continuous <Continuous>  dextrose 5%. 1000 milliLiter(s) (50 mL/Hr) IV Continuous <Continuous>  dextrose 50% Injectable 25 Gram(s) IV Push once  dextrose 50% Injectable 12.5 Gram(s) IV Push once  dextrose 50% Injectable 25 Gram(s) IV Push once  glucagon  Injectable 1 milliGRAM(s) IntraMuscular once  heparin   Injectable 5000 Unit(s) SubCutaneous every 8 hours  insulin lispro (ADMELOG) corrective regimen sliding scale   SubCutaneous every 6 hours  insulin NPH human recombinant 12 Unit(s) SubCutaneous every 6 hours  labetalol 200 milliGRAM(s) Oral every 12 hours  labetalol Injectable 20 milliGRAM(s) IV Push once  norepinephrine Infusion 0.05 MICROgram(s)/kG/Min (3.12 mL/Hr) IV Continuous <Continuous>  predniSONE   Tablet 40 milliGRAM(s) Oral daily  propofol Infusion 0.5 MICROgram(s)/kG/Min (0.2 mL/Hr) IV Continuous <Continuous>  sodium chloride 3%  Inhalation 4 milliLiter(s) Inhalation every 6 hours    MEDICATIONS  (PRN):  acetaminophen   Oral Liquid .. 650 milliGRAM(s) Oral every 6 hours PRN Temp greater or equal to 38C (100.4F), Mild Pain (1 - 3)  dextrose Oral Gel 15 Gram(s) Oral once PRN Blood Glucose LESS THAN 70 milliGRAM(s)/deciliter      Vital Signs Last 24 Hrs  T(F): 98.4 (08-18-23 @ 16:00), Max: 98.8 (08-18-23 @ 08:00)  HR: 64 (08-18-23 @ 19:42) (60 - 82)  BP: 115/52 (08-18-23 @ 12:00) (115/52 - 151/33)  RR: 18 (08-18-23 @ 19:00) (13 - 22)  SpO2: 100% (08-18-23 @ 19:42) (96% - 100%)  Telemetry:   CAPILLARY BLOOD GLUCOSE      POCT Blood Glucose.: 215 mg/dL (18 Aug 2023 17:23)  POCT Blood Glucose.: 213 mg/dL (18 Aug 2023 11:40)  POCT Blood Glucose.: 246 mg/dL (18 Aug 2023 05:09)  POCT Blood Glucose.: 177 mg/dL (18 Aug 2023 00:22)    I&O's Summary    17 Aug 2023 07:01  -  18 Aug 2023 07:00  --------------------------------------------------------  IN: 920 mL / OUT: 200 mL / NET: 720 mL    18 Aug 2023 07:01  -  18 Aug 2023 20:35  --------------------------------------------------------  IN: 300 mL / OUT: 0 mL / NET: 300 mL        PHYSICAL EXAM:  GENERAL: NAD, well-developed  HEAD:  Atraumatic, Normocephalic  EYES: EOMI, PERRLA, conjunctiva and sclera clear  NECK: Supple, No JVD  CHEST/LUNG: Clear to auscultation bilaterally; No wheeze  HEART: Regular rate and rhythm; No murmurs, rubs, or gallops  ABDOMEN: Soft, Nontender, Nondistended; Bowel sounds present  EXTREMITIES:  2+ Peripheral Pulses, No clubbing, cyanosis, or edema  PSYCH: AAOx3  NEUROLOGY: non-focal  SKIN: No rashes or lesions    LABS:                        7.8    17.94 )-----------( 237      ( 18 Aug 2023 00:26 )             24.6     08-18    134<L>  |  96<L>  |  90<H>  ----------------------------<  172<H>  3.6   |  24  |  4.73<H>    Ca    10.5      18 Aug 2023 00:26  Phos  3.5     08-18  Mg     3.30     08-18    TPro  5.4<L>  /  Alb  2.3<L>  /  TBili  <0.2  /  DBili  x   /  AST  30  /  ALT  5   /  AlkPhos  211<H>  08-18          Urinalysis Basic - ( 18 Aug 2023 00:26 )    Color: x / Appearance: x / SG: x / pH: x  Gluc: 172 mg/dL / Ketone: x  / Bili: x / Urobili: x   Blood: x / Protein: x / Nitrite: x   Leuk Esterase: x / RBC: x / WBC x   Sq Epi: x / Non Sq Epi: x / Bacteria: x        RADIOLOGY & ADDITIONAL TESTS:    Imaging Personally Reviewed:    Consultant(s) Notes Reviewed:      Care Discussed with Consultants/Other Providers:

## 2023-08-18 NOTE — PROGRESS NOTE ADULT - SUBJECTIVE AND OBJECTIVE BOX
Neurology Progress Note    S: Patient seen and examined.       Medications: MEDICATIONS  (STANDING):  albuterol/ipratropium for Nebulization 3 milliLiter(s) Nebulizer every 6 hours  atorvastatin 40 milliGRAM(s) Oral at bedtime  carvedilol 6.25 milliGRAM(s) Oral every 12 hours  chlorhexidine 0.12% Liquid 15 milliLiter(s) Oral Mucosa every 12 hours  chlorhexidine 2% Cloths 1 Application(s) Topical daily  dextrose 5%. 1000 milliLiter(s) (100 mL/Hr) IV Continuous <Continuous>  dextrose 5%. 1000 milliLiter(s) (50 mL/Hr) IV Continuous <Continuous>  dextrose 50% Injectable 25 Gram(s) IV Push once  dextrose 50% Injectable 12.5 Gram(s) IV Push once  dextrose 50% Injectable 25 Gram(s) IV Push once  glucagon  Injectable 1 milliGRAM(s) IntraMuscular once  heparin   Injectable 5000 Unit(s) SubCutaneous every 8 hours  insulin lispro (ADMELOG) corrective regimen sliding scale   SubCutaneous every 6 hours  insulin NPH human recombinant 12 Unit(s) SubCutaneous every 6 hours  sodium chloride 3%  Inhalation 4 milliLiter(s) Inhalation every 6 hours    MEDICATIONS  (PRN):  acetaminophen   Oral Liquid .. 650 milliGRAM(s) Oral every 6 hours PRN Temp greater or equal to 38C (100.4F), Mild Pain (1 - 3)  dextrose Oral Gel 15 Gram(s) Oral once PRN Blood Glucose LESS THAN 70 milliGRAM(s)/deciliter  diphenhydrAMINE 25 milliGRAM(s) Oral every 6 hours PRN Rash and/or Itching       Vitals:  Vital Signs Last 24 Hrs  T(C): 37.1 (18 Aug 2023 08:00), Max: 37.3 (17 Aug 2023 16:00)  T(F): 98.8 (18 Aug 2023 08:00), Max: 99.2 (17 Aug 2023 16:00)  HR: 69 (18 Aug 2023 10:36) (68 - 105)  BP: --  BP(mean): --  RR: 16 (18 Aug 2023 10:00) (14 - 28)  SpO2: 100% (18 Aug 2023 10:29) (95% - 100%)    Parameters below as of 18 Aug 2023 10:36  Patient On (Oxygen Delivery Method): ventilator        Neurological Exam:  Mental Status: Intermittent spontaneous eye opening, localizes to daughters voice. Does not follow commands. No attempt to speak. Intubated, off sedation for several days  Cranial Nerves: PERRL slightly sluggish, L eye is slightly exodeviated at times but corrects to midline. Roving eye movements improved today. BTT bilaterally No facial palsy.   Motor: Moves upper > lower extremities spontaneously  Sensation: WD to noxious x4  Reflexes: 1+ throughout at biceps, brachioradialis, triceps, patellars and ankles bilaterally and equal. No clonus.     I personally reviewed the below data/images/labs:      LABS:                          7.8    17.94 )-----------( 237      ( 18 Aug 2023 00:26 )             24.6     08-18    134<L>  |  96<L>  |  90<H>  ----------------------------<  172<H>  3.6   |  24  |  4.73<H>    Ca    10.5      18 Aug 2023 00:26  Phos  3.5     08-18  Mg     3.30     08-18    TPro  5.4<L>  /  Alb  2.3<L>  /  TBili  <0.2  /  DBili  x   /  AST  30  /  ALT  5   /  AlkPhos  211<H>  08-18    LIVER FUNCTIONS - ( 18 Aug 2023 00:26 )  Alb: 2.3 g/dL / Pro: 5.4 g/dL / ALK PHOS: 211 U/L / ALT: 5 U/L / AST: 30 U/L / GGT: x             Urinalysis Basic - ( 18 Aug 2023 00:26 )    Color: x / Appearance: x / SG: x / pH: x  Gluc: 172 mg/dL / Ketone: x  / Bili: x / Urobili: x   Blood: x / Protein: x / Nitrite: x   Leuk Esterase: x / RBC: x / WBC x   Sq Epi: x / Non Sq Epi: x / Bacteria: x          < from: CT Head No Cont (08.15.23 @ 17:20) >    ACC: [id number] EXAM:  CT BRAIN   ORDERED BY: MINAL SAM     PROCEDURE DATE:  08/15/2023          INTERPRETATION:  Clinical indication: Change in neuro exam.    Multiple axial sections were performed from base to vertex without   contrast enhancement. Coronal and sagittal reconstructions were   reformatted well.    This exam is compared prior head CT performed on August 11, 2023    Parenchymal volume loss and chronic microvessel ischemic changes are   again seen.    Abnormal low-attenuation involving the left occipital cortical   subcortical region is again seen. 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    No evidence of acute hemorrhage mass or mass effect is seen.    Evaluation of the osseous structures with appropriate window demonstrates   sclerotic changes about the left mastoid region which appears stable.   Opacification left middle ear region is again seen.    Patient is status post bilateral cataract surgery.    Impression: Stable exam.    < end of copied text >    vEEG:    Clinical Impression:  - Severe diffuse non-specific cerebral dysfunction  - There were no epileptiform abnormalities or seizures recorded.        CTH 8/11:    VENTRICLES AND SULCI: Age-appropriate involutional change  INTRA-AXIAL:  Old left PCA infarct as seen on the prior unchanged.   Microvascular ischemic changes involving the periventricular and   subcortical white matter as seen previously  EXTRA-AXIAL:  No mass or collection is seen.  VISUALIZED SINUSES:  Clear.  VISUALIZED MASTOIDS: Left mastoid sclerosis  CALVARIUM: Infiltrative appearance tothe calvarium may be indicative of   marrow infiltration on the basis of patient's known diagnosis of breast   cancer. MISCELLANEOUS:  None.    IMPRESSION:  No significant interval change compared with 7/17/2023 in   left PCA infarct which is old. Microvascular ischemic changes involving   the periventricular and subcortical white matter as seen   previously.Questionable lesions at the level of the calvarium related to   possible breast CA. Clinical correlation recommended.    --- End of Report ---

## 2023-08-18 NOTE — PROGRESS NOTE ADULT - SUBJECTIVE AND OBJECTIVE BOX
INTERVAL HPI/OVERNIGHT EVENTS:    SUBJECTIVE: Patient seen and examined at bedside. Intubated and following commands. Is answering questions with head nodding per family     OBJECTIVE:    VITAL SIGNS:  ICU Vital Signs Last 24 Hrs  T(C): 37 (18 Aug 2023 04:00), Max: 37.3 (17 Aug 2023 16:00)  T(F): 98.6 (18 Aug 2023 04:00), Max: 99.2 (17 Aug 2023 16:00)  HR: 68 (18 Aug 2023 07:44) (68 - 105)  BP: --  BP(mean): --  ABP: 101/24 (18 Aug 2023 07:00) (101/24 - 178/47)  ABP(mean): 48 (18 Aug 2023 07:00) (48 - 95)  RR: 18 (18 Aug 2023 07:00) (14 - 28)  SpO2: 100% (18 Aug 2023 07:44) (95% - 100%)    O2 Parameters below as of 18 Aug 2023 07:00  Patient On (Oxygen Delivery Method): ventilator    O2 Concentration (%): 30      Mode: AC/ CMV (Assist Control/ Continuous Mandatory Ventilation), RR (machine): 16, TV (machine): 360, FiO2: 30, PEEP: 5, ITime: 0.8, MAP: 13, PIP: 43    08-17 @ 07:01  -  08-18 @ 07:00  --------------------------------------------------------  IN: 920 mL / OUT: 200 mL / NET: 720 mL    08-18 @ 07:01  -  08-18 @ 08:59  --------------------------------------------------------  IN: 40 mL / OUT: 0 mL / NET: 40 mL      CAPILLARY BLOOD GLUCOSE      POCT Blood Glucose.: 246 mg/dL (18 Aug 2023 05:09)      PHYSICAL EXAM:    General: NAD, intubated, opens eyes to commands   HEENT: NC/AT; PERRL  Neck: supple  Respiratory: CTA b/l, not triggering vent   Cardiovascular: +S1/S2; RRR  Abdomen: soft, NT/ND; +BS x4  Extremities: WWP, 2+ peripheral pulses b/l; increasing edema in dependent regions   Skin: erythematous rash, improving     MEDICATIONS:  MEDICATIONS  (STANDING):  albuterol/ipratropium for Nebulization 3 milliLiter(s) Nebulizer every 6 hours  atorvastatin 40 milliGRAM(s) Oral at bedtime  carvedilol 6.25 milliGRAM(s) Oral every 12 hours  chlorhexidine 0.12% Liquid 15 milliLiter(s) Oral Mucosa every 12 hours  chlorhexidine 2% Cloths 1 Application(s) Topical daily  dextrose 5%. 1000 milliLiter(s) (100 mL/Hr) IV Continuous <Continuous>  dextrose 5%. 1000 milliLiter(s) (50 mL/Hr) IV Continuous <Continuous>  dextrose 50% Injectable 25 Gram(s) IV Push once  dextrose 50% Injectable 12.5 Gram(s) IV Push once  dextrose 50% Injectable 25 Gram(s) IV Push once  glucagon  Injectable 1 milliGRAM(s) IntraMuscular once  heparin   Injectable 5000 Unit(s) SubCutaneous every 8 hours  insulin lispro (ADMELOG) corrective regimen sliding scale   SubCutaneous every 6 hours  insulin NPH human recombinant 8 Unit(s) SubCutaneous every 6 hours  sodium chloride 3%  Inhalation 4 milliLiter(s) Inhalation every 6 hours    MEDICATIONS  (PRN):  acetaminophen   Oral Liquid .. 650 milliGRAM(s) Oral every 6 hours PRN Temp greater or equal to 38C (100.4F), Mild Pain (1 - 3)  dextrose Oral Gel 15 Gram(s) Oral once PRN Blood Glucose LESS THAN 70 milliGRAM(s)/deciliter      ALLERGIES:  Allergies    isoniazid (Rash)  nafcillin (Unknown)  hydrALAZINE (Rash)  vitamin E (Short breath; Urticaria; Hives)  doxycycline (Rash)  cefepime (Rash)  NIFEdipine (Urticaria; Hives)    Intolerances        LABS:                        7.8    17.94 )-----------( 237      ( 18 Aug 2023 00:26 )             24.6     08-18    134<L>  |  96<L>  |  90<H>  ----------------------------<  172<H>  3.6   |  24  |  4.73<H>    Ca    10.5      18 Aug 2023 00:26  Phos  3.5     08-18  Mg     3.30     08-18    TPro  5.4<L>  /  Alb  2.3<L>  /  TBili  <0.2  /  DBili  x   /  AST  30  /  ALT  5   /  AlkPhos  211<H>  08-18      Urinalysis Basic - ( 18 Aug 2023 00:26 )    Color: x / Appearance: x / SG: x / pH: x  Gluc: 172 mg/dL / Ketone: x  / Bili: x / Urobili: x   Blood: x / Protein: x / Nitrite: x   Leuk Esterase: x / RBC: x / WBC x   Sq Epi: x / Non Sq Epi: x / Bacteria: x        RADIOLOGY & ADDITIONAL TESTS: Reviewed. INTERVAL HPI/OVERNIGHT EVENTS:    SUBJECTIVE: Patient seen and examined at bedside. Intubated and following commands. Is answering questions with head nodding per family. Improving erythematous rash.      OBJECTIVE:    VITAL SIGNS:  ICU Vital Signs Last 24 Hrs  T(C): 37 (18 Aug 2023 04:00), Max: 37.3 (17 Aug 2023 16:00)  T(F): 98.6 (18 Aug 2023 04:00), Max: 99.2 (17 Aug 2023 16:00)  HR: 68 (18 Aug 2023 07:44) (68 - 105)  BP: --  BP(mean): --  ABP: 101/24 (18 Aug 2023 07:00) (101/24 - 178/47)  ABP(mean): 48 (18 Aug 2023 07:00) (48 - 95)  RR: 18 (18 Aug 2023 07:00) (14 - 28)  SpO2: 100% (18 Aug 2023 07:44) (95% - 100%)    O2 Parameters below as of 18 Aug 2023 07:00  Patient On (Oxygen Delivery Method): ventilator    O2 Concentration (%): 30      Mode: AC/ CMV (Assist Control/ Continuous Mandatory Ventilation), RR (machine): 16, TV (machine): 360, FiO2: 30, PEEP: 5, ITime: 0.8, MAP: 13, PIP: 43    08-17 @ 07:01  -  08-18 @ 07:00  --------------------------------------------------------  IN: 920 mL / OUT: 200 mL / NET: 720 mL    08-18 @ 07:01  -  08-18 @ 08:59  --------------------------------------------------------  IN: 40 mL / OUT: 0 mL / NET: 40 mL      CAPILLARY BLOOD GLUCOSE      POCT Blood Glucose.: 246 mg/dL (18 Aug 2023 05:09)      PHYSICAL EXAM:    General: NAD, intubated, opens eyes to commands   HEENT: NC/AT; PERRL  Neck: supple  Respiratory: CTA b/l, not triggering vent   Cardiovascular: +S1/S2; RRR  Abdomen: soft, NT/ND; +BS x4  Extremities: WWP, 2+ peripheral pulses b/l; increasing edema in dependent regions   Skin: erythematous rash, improving     MEDICATIONS:  MEDICATIONS  (STANDING):  albuterol/ipratropium for Nebulization 3 milliLiter(s) Nebulizer every 6 hours  atorvastatin 40 milliGRAM(s) Oral at bedtime  carvedilol 6.25 milliGRAM(s) Oral every 12 hours  chlorhexidine 0.12% Liquid 15 milliLiter(s) Oral Mucosa every 12 hours  chlorhexidine 2% Cloths 1 Application(s) Topical daily  dextrose 5%. 1000 milliLiter(s) (100 mL/Hr) IV Continuous <Continuous>  dextrose 5%. 1000 milliLiter(s) (50 mL/Hr) IV Continuous <Continuous>  dextrose 50% Injectable 25 Gram(s) IV Push once  dextrose 50% Injectable 12.5 Gram(s) IV Push once  dextrose 50% Injectable 25 Gram(s) IV Push once  glucagon  Injectable 1 milliGRAM(s) IntraMuscular once  heparin   Injectable 5000 Unit(s) SubCutaneous every 8 hours  insulin lispro (ADMELOG) corrective regimen sliding scale   SubCutaneous every 6 hours  insulin NPH human recombinant 8 Unit(s) SubCutaneous every 6 hours  sodium chloride 3%  Inhalation 4 milliLiter(s) Inhalation every 6 hours    MEDICATIONS  (PRN):  acetaminophen   Oral Liquid .. 650 milliGRAM(s) Oral every 6 hours PRN Temp greater or equal to 38C (100.4F), Mild Pain (1 - 3)  dextrose Oral Gel 15 Gram(s) Oral once PRN Blood Glucose LESS THAN 70 milliGRAM(s)/deciliter      ALLERGIES:  Allergies    isoniazid (Rash)  nafcillin (Unknown)  hydrALAZINE (Rash)  vitamin E (Short breath; Urticaria; Hives)  doxycycline (Rash)  cefepime (Rash)  NIFEdipine (Urticaria; Hives)    Intolerances        LABS:                        7.8    17.94 )-----------( 237      ( 18 Aug 2023 00:26 )             24.6     08-18    134<L>  |  96<L>  |  90<H>  ----------------------------<  172<H>  3.6   |  24  |  4.73<H>    Ca    10.5      18 Aug 2023 00:26  Phos  3.5     08-18  Mg     3.30     08-18    TPro  5.4<L>  /  Alb  2.3<L>  /  TBili  <0.2  /  DBili  x   /  AST  30  /  ALT  5   /  AlkPhos  211<H>  08-18      Urinalysis Basic - ( 18 Aug 2023 00:26 )    Color: x / Appearance: x / SG: x / pH: x  Gluc: 172 mg/dL / Ketone: x  / Bili: x / Urobili: x   Blood: x / Protein: x / Nitrite: x   Leuk Esterase: x / RBC: x / WBC x   Sq Epi: x / Non Sq Epi: x / Bacteria: x        RADIOLOGY & ADDITIONAL TESTS: Reviewed.

## 2023-08-18 NOTE — PROGRESS NOTE ADULT - SUBJECTIVE AND OBJECTIVE BOX
Overnight events noted      VITAL:  T(C): , Max: 37.3 (08-17-23 @ 16:00)  T(F): , Max: 99.2 (08-17-23 @ 16:00)  HR: 69 (08-18-23 @ 11:23)  BP: 151/33 (08-18-23 @ 11:15)  BP(mean): 61 (08-18-23 @ 11:15)  RR: 15 (08-18-23 @ 11:23)  SpO2: 100% (08-18-23 @ 11:23)  urine output 200cc/24h      PHYSICAL EXAM:  Constitutional: intubated/minimally communicative off sedation  HEENT: (+)ETT/OGT  Neck: Supple, No JVD  Respiratory: coarse BS b/l  Cardiovascular: RRR s1s2, no m/r/g  Gastrointestinal: BS+, soft, NT/ND  Extremities: No peripheral edema b/l  Back: no CVAT b/l  Skin: No rashes, no nevi    LABS:                        7.8    17.94 )-----------( 237      ( 18 Aug 2023 00:26 )             24.6     Na(134)/K(3.6)/Cl(96)/HCO3(24)/BUN(90)/Cr(4.73)Glu(172)/Ca(10.5)/Mg(3.30)/PO4(3.5)    08-18 @ 00:26  Na(134)/K(3.5)/Cl(96)/HCO3(24)/BUN(79)/Cr(4.63)Glu(291)/Ca(10.2)/Mg(3.10)/PO4(3.9)    08-17 @ 00:15  Na(132)/K(3.7)/Cl(95)/HCO3(22)/BUN(69)/Cr(4.35)Glu(240)/Ca(9.5)/Mg(3.10)/PO4(4.0)    08-16 @ 00:08        IMPRESSION: 73F w/ HTN, DM2, CVA, breast CA-bilateral mastectomy, recurrent aspiration pneumonia/respiratory failure, and CKD, 8/11/23 p/w acute hypercapnic respiratory failure; c/b RUSS    (1)Renal - CKD4     (2)RUSS - most likely ischemic ATN; less likely but possibly AIN; borderline anuric, and creatinine continuing to rise. No urgent need for HD for now    (3)Lytes - acceptable    (4)Pulm- hypercapnic respiratory failure on admission - remains intubated    (5)ID - PNA - s/p IV abx    (6)CV - hypertensive - now on Coreg    (7)AMS - improving    (8)GOC - full code      RECOMMEND:  (1)Coreg as needed for hypertension  (2)Dose new meds for GFR <15 (present dosing is acceptable)  (3)BMP+MG+PO4 daily  (4)No HD just yet - potential HD within next 1-2 days f we remain on the current trajectory  (5)Favor not adding steroids for now  (6)Can d/c ASA and plan for IR-guided renal biopsy next week if renal function has not started to improve by that time                Jimmy Colon MD  [facility]  Office/on call physician: [phone]  Cell (7a-7p): [phone]        at bedside   patient remains on vent      VITAL:  T(C): , Max: 37.3 (08-17-23 @ 16:00)  T(F): , Max: 99.2 (08-17-23 @ 16:00)  HR: 69 (08-18-23 @ 11:23)  BP: 151/33 (08-18-23 @ 11:15)  BP(mean): 61 (08-18-23 @ 11:15)  RR: 15 (08-18-23 @ 11:23)  SpO2: 100% (08-18-23 @ 11:23)  urine output 200cc/24h documented (incontinent/not accounting for all urine output)      PHYSICAL EXAM:  Constitutional: intubated/following simple commands off sedation  HEENT: (+)ETT/OGT  Neck: Supple, No JVD  Respiratory: coarse BS b/l/decreased at bases  Cardiovascular: RRR s1s2, no m/r/g  Gastrointestinal: BS+, soft, NT/ND  Extremities: No peripheral edema b/l  Back: no CVAT b/l  Skin: No rashes, no nevi    LABS:                        7.8    17.94 )-----------( 237      ( 18 Aug 2023 00:26 )             24.6     Na(134)/K(3.6)/Cl(96)/HCO3(24)/BUN(90)/Cr(4.73)Glu(172)/Ca(10.5)/Mg(3.30)/PO4(3.5)    08-18 @ 00:26  Na(134)/K(3.5)/Cl(96)/HCO3(24)/BUN(79)/Cr(4.63)Glu(291)/Ca(10.2)/Mg(3.10)/PO4(3.9)    08-17 @ 00:15  Na(132)/K(3.7)/Cl(95)/HCO3(22)/BUN(69)/Cr(4.35)Glu(240)/Ca(9.5)/Mg(3.10)/PO4(4.0)    08-16 @ 00:08        IMPRESSION: 73F w/ HTN, DM2, CVA, breast CA-bilateral mastectomy, recurrent aspiration pneumonia/respiratory failure, and CKD, 8/11/23 p/w acute hypercapnic respiratory failure; c/b RUSS    (1)Renal - CKD4     (2)RUSS - most likely ischemic ATN; less likely but possibly AIN; borderline anuric, and creatinine continuing to rise. No urgent need for HD for now    (3)Lytes - acceptable    (4)Pulm- hypercapnic respiratory failure on admission - remains intubated; (+)pleural effusions    (5)ID - PNA - s/p IV abx    (6)CV - hypertensive - now on Coreg    (7)AMS - improving    (8)GOC - full code      RECOMMEND:  (1)No objection to empiric high-dose steroids here for treatment of potential AIN  (2)No ASA for now in anticipation of potential IR-guided renal biopsy next week  (3)Coreg as needed for hypertension  (4)Can give IV diuretics to improve urine output/prevent worsening pleural effusions/pulm edema  (5)No HD just yet - may look to initiate as soon as tomorrow if numbers continue to worsen    Counseled  in depth, again, at bedside. He asks that we reach out to patient's outside nephrologist Dr. Cem Neely to ensure that he is on the same page in terms of plan and that he thinks dialysis is appropriate. He will wait for Dr. Neely's input before he consents to HD. I obliged and provided a synopsis of the patient's hospital course to Dr. Neely. Recommendations d/w'd Critical Care Attg Dr. Karan Steiner, and with House Staff.        Jimmy Colon MD  [facility]  Office/on call physician: [phone]  Cell (7a-7p): [phone]

## 2023-08-18 NOTE — CHART NOTE - NSCHARTNOTEFT_GEN_A_CORE
: Elfego/Jatin  INDICATION: Respiratory failure    PROCEDURE:  [x ] LIMITED ECHO  [ x] LIMITED CHEST  [ ] LIMITED RETROPERITONEAL  [ ] LIMITED ABDOMINAL  [ ] LIMITED DVT  [ ] NEEDLE GUIDANCE VASCULAR  [ ] NEEDLE GUIDANCE THORACENTESIS  [ ] NEEDLE GUIDANCE PARACENTESIS  [ ] NEEDLE GUIDANCE PERICARDIOCENTESIS  [ ] OTHER    FINDINGS:    Chest: A-line predominant with scattered singular B-lines. Focal B-line pattern LLL. Moderate R PLEFF  CV: Normal LV function       INTERPRETATION: Normal GDE. Normal aeration pattern with moderate R PLEFF. Interstitial pattern LLL with irregular pleural surface-unclear etiology (PNA vs.       Images uploaded on [facility] : Elfego/Jatin  INDICATION: Respiratory failure    PROCEDURE:  [x ] LIMITED ECHO  [ x] LIMITED CHEST  [ ] LIMITED RETROPERITONEAL  [ ] LIMITED ABDOMINAL  [ ] LIMITED DVT  [ ] NEEDLE GUIDANCE VASCULAR  [ ] NEEDLE GUIDANCE THORACENTESIS  [ ] NEEDLE GUIDANCE PARACENTESIS  [ ] NEEDLE GUIDANCE PERICARDIOCENTESIS  [ ] OTHER    FINDINGS:    Chest: A-line predominant with scattered singular B-lines however, diffuse B-line pattern in LLL. Moderate R PLEFF  CV: Normal LV function       INTERPRETATION: Normal GDE. Normal aeration pattern with moderate R PLEFF. Focal Interstitial pattern LLL with irregular pleural surface      Images uploaded on [facility] : Elfego/Jatin  INDICATION: Respiratory failure    PROCEDURE:  [x ] LIMITED ECHO  [ x] LIMITED CHEST  [ ] LIMITED RETROPERITONEAL  [ ] LIMITED ABDOMINAL  [ ] LIMITED DVT  [ ] NEEDLE GUIDANCE VASCULAR  [ ] NEEDLE GUIDANCE THORACENTESIS  [ ] NEEDLE GUIDANCE PARACENTESIS  [ ] NEEDLE GUIDANCE PERICARDIOCENTESIS  [ ] OTHER    FINDINGS:    Chest: A-line predominant with scattered singular B-lines however, diffuse B-line pattern in LLL. Moderate R PLEFF  CV: Normal LV function       INTERPRETATION: Normal GDE. Normal aeration pattern with moderate R PLEFF. Focal Interstitial pattern LLL with irregular pleural surface could represent underlying known bronchiectasis vs. PNA      Images uploaded on [facility] : Elfego/Jatin  INDICATION: Respiratory failure    PROCEDURE:  [x ] LIMITED ECHO  [ x] LIMITED CHEST  [ ] LIMITED RETROPERITONEAL  [ ] LIMITED ABDOMINAL  [ ] LIMITED DVT  [ ] NEEDLE GUIDANCE VASCULAR  [ ] NEEDLE GUIDANCE THORACENTESIS  [ ] NEEDLE GUIDANCE PARACENTESIS  [ ] NEEDLE GUIDANCE PERICARDIOCENTESIS  [ ] OTHER    FINDINGS:    Chest: A-line predominant with scattered singular B-lines however, diffuse B-line pattern in LLL. Moderate R PLEFF  CV: Normal LV function       INTERPRETATION: Normal GDE. Normal aeration pattern with moderate R PLEFF. Focal Interstitial pattern LLL with irregular pleural surface could represent underlying known bronchiectasis vs. PNA      Images uploaded on [facility]    I was present during the key portions of the procedure and immediately available during the entire procedure.  Jatin TITUS  Attending

## 2023-08-19 NOTE — PROGRESS NOTE ADULT - SUBJECTIVE AND OBJECTIVE BOX
INTERVAL HPI/OVERNIGHT EVENTS:    SUBJECTIVE: Patient seen and examined at bedside.     OBJECTIVE:    VITAL SIGNS:  ICU Vital Signs Last 24 Hrs  T(C): 37.3 (19 Aug 2023 04:00), Max: 37.3 (19 Aug 2023 04:00)  T(F): 99.2 (19 Aug 2023 04:00), Max: 99.2 (19 Aug 2023 04:00)  HR: 68 (19 Aug 2023 06:50) (60 - 82)  BP: 115/52 (18 Aug 2023 12:00) (115/52 - 151/33)  BP(mean): 61 (18 Aug 2023 12:00) (61 - 61)  ABP: 171/42 (19 Aug 2023 06:50) (95/25 - 209/57)  ABP(mean): 88 (19 Aug 2023 06:50) (42 - 118)  RR: 16 (19 Aug 2023 06:50) (13 - 22)  SpO2: 100% (19 Aug 2023 06:50) (98% - 100%)    O2 Parameters below as of 19 Aug 2023 06:50  Patient On (Oxygen Delivery Method): ventilator    O2 Concentration (%): 30      Mode: AC/ CMV (Assist Control/ Continuous Mandatory Ventilation), RR (machine): 16, TV (machine): 360, FiO2: 30, PEEP: 5, MAP: 11, PIP: 32    08-18 @ 07:01  -  08-19 @ 07:00  --------------------------------------------------------  IN: 660 mL / OUT: 0 mL / NET: 660 mL      CAPILLARY BLOOD GLUCOSE      POCT Blood Glucose.: 312 mg/dL (19 Aug 2023 05:19)      PHYSICAL EXAM:    General: NAD  HEENT: NC/AT; PERRL, clear conjunctiva  Neck: supple  Respiratory: CTA b/l  Cardiovascular: +S1/S2; RRR  Abdomen: soft, NT/ND; +BS x4  Extremities: WWP, 2+ peripheral pulses b/l; no LE edema  Skin: normal color and turgor; no rash  Neurological:    MEDICATIONS:  MEDICATIONS  (STANDING):  albuterol/ipratropium for Nebulization 3 milliLiter(s) Nebulizer every 6 hours  atorvastatin 40 milliGRAM(s) Oral at bedtime  carvedilol 12.5 milliGRAM(s) Oral every 12 hours  chlorhexidine 0.12% Liquid 15 milliLiter(s) Oral Mucosa every 12 hours  chlorhexidine 2% Cloths 1 Application(s) Topical daily  dextrose 5%. 1000 milliLiter(s) (50 mL/Hr) IV Continuous <Continuous>  dextrose 5%. 1000 milliLiter(s) (100 mL/Hr) IV Continuous <Continuous>  dextrose 50% Injectable 12.5 Gram(s) IV Push once  dextrose 50% Injectable 25 Gram(s) IV Push once  dextrose 50% Injectable 25 Gram(s) IV Push once  glucagon  Injectable 1 milliGRAM(s) IntraMuscular once  heparin   Injectable 5000 Unit(s) SubCutaneous every 8 hours  insulin lispro (ADMELOG) corrective regimen sliding scale   SubCutaneous every 6 hours  insulin NPH human recombinant 12 Unit(s) SubCutaneous every 6 hours  labetalol 200 milliGRAM(s) Oral every 12 hours  nitroglycerin  Infusion 20 MICROgram(s)/Min (6 mL/Hr) IV Continuous <Continuous>  predniSONE   Tablet 40 milliGRAM(s) Oral daily  sodium chloride 3%  Inhalation 4 milliLiter(s) Inhalation every 6 hours    MEDICATIONS  (PRN):  acetaminophen   Oral Liquid .. 650 milliGRAM(s) Oral every 6 hours PRN Temp greater or equal to 38C (100.4F), Mild Pain (1 - 3)  dextrose Oral Gel 15 Gram(s) Oral once PRN Blood Glucose LESS THAN 70 milliGRAM(s)/deciliter      ALLERGIES:  Allergies    isoniazid (Rash)  nafcillin (Unknown)  hydrALAZINE (Rash)  vitamin E (Short breath; Urticaria; Hives)  doxycycline (Rash)  cefepime (Rash)  NIFEdipine (Urticaria; Hives)    Intolerances        LABS:                        7.6    14.80 )-----------( 237      ( 19 Aug 2023 00:20 )             23.4     08-19    133<L>  |  96<L>  |  100<H>  ----------------------------<  251<H>  4.0   |  24  |  4.80<H>    Ca    10.9<H>      19 Aug 2023 00:20  Phos  3.4     08-19  Mg     3.30     08-19    TPro  5.6<L>  /  Alb  2.3<L>  /  TBili  0.2  /  DBili  x   /  AST  36<H>  /  ALT  9   /  AlkPhos  249<H>  08-19      Urinalysis Basic - ( 19 Aug 2023 00:20 )    Color: x / Appearance: x / SG: x / pH: x  Gluc: 251 mg/dL / Ketone: x  / Bili: x / Urobili: x   Blood: x / Protein: x / Nitrite: x   Leuk Esterase: x / RBC: x / WBC x   Sq Epi: x / Non Sq Epi: x / Bacteria: x        RADIOLOGY & ADDITIONAL TESTS: Reviewed. INTERVAL HPI/OVERNIGHT EVENTS: Hypertensive urgency to > 200-220 systolic and started on nitro gtt (multiple allergies to hydral and nifedipine)     SUBJECTIVE: Patient seen and examined at bedside. Off sedation and responding to commands, starting to trigger vent.     OBJECTIVE:    VITAL SIGNS:  ICU Vital Signs Last 24 Hrs  T(C): 37.3 (19 Aug 2023 04:00), Max: 37.3 (19 Aug 2023 04:00)  T(F): 99.2 (19 Aug 2023 04:00), Max: 99.2 (19 Aug 2023 04:00)  HR: 68 (19 Aug 2023 06:50) (60 - 82)  BP: 115/52 (18 Aug 2023 12:00) (115/52 - 151/33)  BP(mean): 61 (18 Aug 2023 12:00) (61 - 61)  ABP: 171/42 (19 Aug 2023 06:50) (95/25 - 209/57)  ABP(mean): 88 (19 Aug 2023 06:50) (42 - 118)  RR: 16 (19 Aug 2023 06:50) (13 - 22)  SpO2: 100% (19 Aug 2023 06:50) (98% - 100%)    O2 Parameters below as of 19 Aug 2023 06:50  Patient On (Oxygen Delivery Method): ventilator    O2 Concentration (%): 30      Mode: AC/ CMV (Assist Control/ Continuous Mandatory Ventilation), RR (machine): 16, TV (machine): 360, FiO2: 30, PEEP: 5, MAP: 11, PIP: 32    08-18 @ 07:01  -  08-19 @ 07:00  --------------------------------------------------------  IN: 660 mL / OUT: 0 mL / NET: 660 mL      CAPILLARY BLOOD GLUCOSE      POCT Blood Glucose.: 312 mg/dL (19 Aug 2023 05:19)      PHYSICAL EXAM:    General: NAD, intubated   HEENT: NC/AT; PERRL, clear conjunctiva  Respiratory: CTA b/l  Cardiovascular: +S1/S2; RRR  Abdomen: soft, NT/ND; +BS x4  Extremities: WWP, 2+ peripheral pulses b/l; +edema in dependent regions   Skin: improved rash       MEDICATIONS:  MEDICATIONS  (STANDING):  albuterol/ipratropium for Nebulization 3 milliLiter(s) Nebulizer every 6 hours  atorvastatin 40 milliGRAM(s) Oral at bedtime  carvedilol 12.5 milliGRAM(s) Oral every 12 hours  chlorhexidine 0.12% Liquid 15 milliLiter(s) Oral Mucosa every 12 hours  chlorhexidine 2% Cloths 1 Application(s) Topical daily  dextrose 5%. 1000 milliLiter(s) (50 mL/Hr) IV Continuous <Continuous>  dextrose 5%. 1000 milliLiter(s) (100 mL/Hr) IV Continuous <Continuous>  dextrose 50% Injectable 12.5 Gram(s) IV Push once  dextrose 50% Injectable 25 Gram(s) IV Push once  dextrose 50% Injectable 25 Gram(s) IV Push once  glucagon  Injectable 1 milliGRAM(s) IntraMuscular once  heparin   Injectable 5000 Unit(s) SubCutaneous every 8 hours  insulin lispro (ADMELOG) corrective regimen sliding scale   SubCutaneous every 6 hours  insulin NPH human recombinant 12 Unit(s) SubCutaneous every 6 hours  labetalol 200 milliGRAM(s) Oral every 12 hours  nitroglycerin  Infusion 20 MICROgram(s)/Min (6 mL/Hr) IV Continuous <Continuous>  predniSONE   Tablet 40 milliGRAM(s) Oral daily  sodium chloride 3%  Inhalation 4 milliLiter(s) Inhalation every 6 hours    MEDICATIONS  (PRN):  acetaminophen   Oral Liquid .. 650 milliGRAM(s) Oral every 6 hours PRN Temp greater or equal to 38C (100.4F), Mild Pain (1 - 3)  dextrose Oral Gel 15 Gram(s) Oral once PRN Blood Glucose LESS THAN 70 milliGRAM(s)/deciliter      ALLERGIES:  Allergies    isoniazid (Rash)  nafcillin (Unknown)  hydrALAZINE (Rash)  vitamin E (Short breath; Urticaria; Hives)  doxycycline (Rash)  cefepime (Rash)  NIFEdipine (Urticaria; Hives)    Intolerances        LABS:                        7.6    14.80 )-----------( 237      ( 19 Aug 2023 00:20 )             23.4     08-19    133<L>  |  96<L>  |  100<H>  ----------------------------<  251<H>  4.0   |  24  |  4.80<H>    Ca    10.9<H>      19 Aug 2023 00:20  Phos  3.4     08-19  Mg     3.30     08-19    TPro  5.6<L>  /  Alb  2.3<L>  /  TBili  0.2  /  DBili  x   /  AST  36<H>  /  ALT  9   /  AlkPhos  249<H>  08-19      Urinalysis Basic - ( 19 Aug 2023 00:20 )    Color: x / Appearance: x / SG: x / pH: x  Gluc: 251 mg/dL / Ketone: x  / Bili: x / Urobili: x   Blood: x / Protein: x / Nitrite: x   Leuk Esterase: x / RBC: x / WBC x   Sq Epi: x / Non Sq Epi: x / Bacteria: x        RADIOLOGY & ADDITIONAL TESTS: Reviewed.

## 2023-08-19 NOTE — PROGRESS NOTE ADULT - ASSESSMENT
74 y/o F well known to me from my [facility] practice. she was admitted at [facility] 7/12-7/22 w aspiration PNA, was treated w CEFEPIME, developed an allergic rash,  dCHF, + MAC on AFB culture, had been progressively getting more and more lethargic and dyspneic at home since DC.   In  am of 8/11/23  ptn presented with respiratory distress w hypoxia and hypercarbia requiring intubation 2/2 volume overload +/- Asp PNA      Neuro   off sedation  well known to Dr. Bianchi, consult reviewed   Baseline MS AOx3, aphasic   - h/o CVA , on aspirin and statin . resumed w feeding tube  - eeg  2/2 tremors, no sz focus  - starting to become more responsive but not triggering the vent  - MRI 8/17:  new R cerebellar infarct, old left PCA/Occipital infarct. probably embolic in nature, did not tolerate full AC in the past. pending STEPHANIE.       Cardiac   Ptn well known to Dr. Dunn, consult reviewed   CHFpEF   TTE 7/2023 with EF 59%, with severe LVH and diastolic dysfunction   pro-BNP > 37641, trop 78       Pulmonary   Acute hypercapnic and hypoxemic respiratory failure   well known to Dr. Mcnulty, consult reviewed   DDx: aspiration vs volume overload   VBG with respiratory acidosis pCO2 111  CT chest: mod ( worsening) R pl effusion  - cefepime DCed on 8/13, started on Moxifloxacin on 8/13, since 8/14 off ABx    Renal   Hyperkalemia with EKG changes  , initially treated w LOKELMA,  now resolved   Ptn well know to Dr. Colon, consult reviewed         worsening renal function, almost anuric, fluid overloaded, BP elevated, on nitro drip, labetalol, on empiric steroids for AIN , no need for immediate HD,   may need a renal biopsy 8/22    GI  NPO for now, on tube feeds  would start GI ppx w IV PPI    Endocrine  T2DM   A1C 7.1 in 7/2023   - BG goal 140-200     ID   No fever/leukocytosis, recheck temp   Hx latent TB which was treated, no concern for TB   - grew MAC on AFB culture from most recent admission. would call ID consult  Started on empiric vancomycin and aztreonam,  changed to cefepime 8/12, cefepime dced on 8/13(cefepime/zosyn allergy.  developed rash when on cefepime on previous admission ) . started on AVALOX on 8/13, off ABx on 8/14. ptn has an allergic rash, prob 2/2 cefepime  - f/u MRSA   - all cxs NTD    Heme/Onc  ppx: heparin q8 hrs     Ethics  GOC - Discussed GOC with daughter and , they have opted in the past for full code. and she remains full code at present

## 2023-08-19 NOTE — PROGRESS NOTE ADULT - ASSESSMENT
72 y/o F DM on insulin, HTN, CKD, CHFpEF, breast CA, respiratory arrest and cardiac arrest (2018), CVA with residual weakness, aspiration PNA h/o trache, PEG, both removed presents with respiratory distress requiring intubation. Found to have elevated BNP, may be 2/2 to volume overload i/s/o CKD vs CHF.     Neuro ;  - Sedated : Baseline MS AOx3   -WAS ON PROPOFOL AND FENTANYL BEFORE: NOW OFF:    - Monitor her mental status:  requiring only 30% fio2:    -8/15: - now she has been off sedation for more then 24 hours but is still lethargic: her o2 requirement has not increased:   -for possible extubation if she wakes up   8/16:: -dw PMD: pt is still lethargic  for MRI brain stem today   8/17": -had ct head: OK: awaiting MRI brain : still lethargic  8/18: seemed same to me: family at bedside who says she is little  bit more awake: oxygen requirement is same:  at 30%:off vasopressors   -now neuro note reviewed:  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 reason for inability ot trigger vent and co2 retention initially ? candidate for trach    -now the co2 i s normal : not much improvement in her mental statis:  off sedation for days   CVA   - cont aspirin and statin   -per neurology   Cardiac   -CHFpEF : TTE 7/2023 with EF 59%, with severe LVH and diastolic dysfunction : pro-BNP > 30893, trop 78   -on bumex drip :  -cards following   8/15: : she is off bumex drip and is on midodrine   8/16: remains off bumex  8/18:: off bumex:  defer to MICU team   cont to remain off diuretics     Pulmonary   -Acute hypercapnic and hypoxemic respiratory failure   -DDx: aspiration vs volume overload  VBG with respiratory acidosis pCO2 111  - CXR with small right pleural effusion, no consolidations/opacities  -now she seems better:  fio2: 30$:  -? etiology of hypercarbia:  multifactorial : seems to have OHS and TYRONE superimposed by acute chf  ? and aspiration pneumonia:"   -pt is mostly bed bound:   -it is possible that this time:  she mght need bipap on dc : atleast at the night time:   -on 8/15:  still remains intubated  8/16:still lethargic:  not extubated hypercarbic acidotic today on abg:  weaning trials :probably would need bipap following extubation  8/17: :still lethargic: :awaiting mri brain : dw    8/18: :MRI done has new right cerebellar infarct on mri : defer to MICU   -seen by bora now:  has brain stem infarct to oper neuro note:  reason for being vent;    /Renal   -Hyperkalemia with EKG changes  : 7.2, hemolyzed, given insulin and D50 + calcium gluc   -K is better today : cont to monitor  -normal today on 8/15   8/16:stable  8/17:-k has been ok for l ast few days  8/18: stable  8/19: started on predniosne yesterday for suspected AIN by renal    GI NPO for now  Endocrine  T2DM : A1C 7.1 in 7/2023   -cont to monitor blood glucose  ID   - No fever/leukocytosis  - Hx latent TB which was treated, no concern for TB  : She was recently ruled out for tuberculosis at [facility]   - Started on empiric vancomycin and aztreonam (cefepime/zosyn allergy? developed rash req prednisone)  : now dced:   defer to MICU   - f/u MRSA   - follow up blood culture/urine   8/16:blood cultures negative so far: legionella is negative:  remains off antibiotics  staph aureus on sputum   8/17:: off antibiotics at this time: secondary to drug eruption  8/18: remains off antibiotics   -8/19: off diuretics     dw micu and  at bedside  overall prognosis seems guarded

## 2023-08-19 NOTE — PROGRESS NOTE ADULT - SUBJECTIVE AND OBJECTIVE BOX
NEPHROLOGY[facility] [phone]        Patient seen and examined in bed.  She was in good spirits         MEDICATIONS  (STANDING):  albuterol/ipratropium for Nebulization 3 milliLiter(s) Nebulizer every 6 hours  atorvastatin 40 milliGRAM(s) Oral at bedtime  carvedilol 37.5 milliGRAM(s) Oral every 12 hours  chlorhexidine 0.12% Liquid 15 milliLiter(s) Oral Mucosa every 12 hours  chlorhexidine 2% Cloths 1 Application(s) Topical daily  dextrose 5%. 1000 milliLiter(s) (50 mL/Hr) IV Continuous <Continuous>  dextrose 5%. 1000 milliLiter(s) (100 mL/Hr) IV Continuous <Continuous>  dextrose 50% Injectable 12.5 Gram(s) IV Push once  dextrose 50% Injectable 25 Gram(s) IV Push once  dextrose 50% Injectable 25 Gram(s) IV Push once  glucagon  Injectable 1 milliGRAM(s) IntraMuscular once  heparin   Injectable 5000 Unit(s) SubCutaneous every 8 hours  insulin lispro (ADMELOG) corrective regimen sliding scale   SubCutaneous every 6 hours  insulin NPH human recombinant 12 Unit(s) SubCutaneous every 6 hours  labetalol 200 milliGRAM(s) Oral every 12 hours  nitroglycerin  Infusion 20 MICROgram(s)/Min (6 mL/Hr) IV Continuous <Continuous>  predniSONE   Tablet 40 milliGRAM(s) Oral daily  sodium chloride 3%  Inhalation 4 milliLiter(s) Inhalation every 6 hours      VITAL:  T(C): , Max: 37.3 (08-19-23 @ 04:00)  T(F): , Max: 99.2 (08-19-23 @ 04:00)  HR: 74 (08-19-23 @ 09:30)  BP: 115/52 (08-18-23 @ 12:00)  BP(mean): 61 (08-18-23 @ 12:00)  RR: 14 (08-19-23 @ 09:30)  SpO2: 100% (08-19-23 @ 09:30)  Wt(kg): --    I and O's:    08-18 @ 07:01  -  08-19 @ 07:00  --------------------------------------------------------  IN: 660 mL / OUT: 0 mL / NET: 660 mL          PHYSICAL EXAM:    Constitutional: intubated   Neck:  No JVD  Respiratory: transmitted   Cardiovascular: S1 and S2  Gastrointestinal: BS+, soft, NT/ND  Extremities: No peripheral edema  Neurological:    : +  Wheeler  Skin: No rashes  Access: Not applicable    LABS:                        7.6    14.80 )-----------( 237      ( 19 Aug 2023 00:20 )             23.4     08-19    133<L>  |  96<L>  |  100<H>  ----------------------------<  251<H>  4.0   |  24  |  4.80<H>    Ca    10.9<H>      19 Aug 2023 00:20  Phos  3.4     08-19  Mg     3.30     08-19    TPro  5.6<L>  /  Alb  2.3<L>  /  TBili  0.2  /  DBili  x   /  AST  36<H>  /  ALT  9   /  AlkPhos  249<H>  08-19          Urine Studies:  Urinalysis Basic - ( 19 Aug 2023 00:20 )    Color: x / Appearance: x / SG: x / pH: x  Gluc: 251 mg/dL / Ketone: x  / Bili: x / Urobili: x   Blood: x / Protein: x / Nitrite: x   Leuk Esterase: x / RBC: x / WBC x   Sq Epi: x / Non Sq Epi: x / Bacteria: x            RADIOLOGY & ADDITIONAL STUDIES:            < from: MR Head No Cont (08.17.23 @ 19:58) >    ACC: [id number] EXAM:  MR BRAIN   ORDERED BY: SARAH DEAL     PROCEDURE DATE:  08/17/2023          INTERPRETATION:  Clinical Indication: Altered mental status    Technique: Multi-planar, multi-squence noncontrast brain MRI was   performed.    Comparison: 8/15/2023    Findings:  Diagnostic accuracy is limited secondary to patient motion.  There is an acute infarct involving the right cerebellum. There is no   evidence of acute intracranial hemorrhage. Chronic left PCA territory   infarct with associated ex vacuo dilatation of the occipital horn of the   left lateral ventricle. Ventricles and sulci are prominent consistent   with age-related parenchymal volume loss. No midline shift or other   significant mass effect is noted. Gray-white differentiation is   maintained throughout. Normal flow voids are maintained in the dominant   arterial and venous structures. There are patchy and confluent foci of   hyperintense T2 signal within the subcortical and periventricular white   matter which are nonspecific but likely related to chronic microvascular   ischemic disease.    Moderate mucosal thickening of the paranasal sinuses. The tympanomastoid   spaces are clear. Orbits and orbital contents are unremarkable.    IMPRESSION:    MOTION LIMITED STUDY. ACUTE INFARCT INVOLVING THE RIGHT CEREBELLUM. THERE   IS NO EVIDENCE OF ACUTE INTRACRANIAL HEMORRHAGE.    --- End of Report ---            LUCIA PHAM MD; Attending Radiologist  This document has been electronically signed. Aug 17 2023  8:41PM    < end of copied text >

## 2023-08-19 NOTE — PROGRESS NOTE ADULT - SUBJECTIVE AND OBJECTIVE BOX
Date of Service: 08-19-23 @ 10:05    Patient is a 73y old  Female who presents with a chief complaint of Respiratory distress (19 Aug 2023 07:59)      Any change in ROS: remains intubated:   slightly awake:   at bedside:     MEDICATIONS  (STANDING):  albuterol/ipratropium for Nebulization 3 milliLiter(s) Nebulizer every 6 hours  atorvastatin 40 milliGRAM(s) Oral at bedtime  carvedilol 37.5 milliGRAM(s) Oral every 12 hours  chlorhexidine 0.12% Liquid 15 milliLiter(s) Oral Mucosa every 12 hours  chlorhexidine 2% Cloths 1 Application(s) Topical daily  dextrose 5%. 1000 milliLiter(s) (50 mL/Hr) IV Continuous <Continuous>  dextrose 5%. 1000 milliLiter(s) (100 mL/Hr) IV Continuous <Continuous>  dextrose 50% Injectable 12.5 Gram(s) IV Push once  dextrose 50% Injectable 25 Gram(s) IV Push once  dextrose 50% Injectable 25 Gram(s) IV Push once  glucagon  Injectable 1 milliGRAM(s) IntraMuscular once  heparin   Injectable 5000 Unit(s) SubCutaneous every 8 hours  insulin lispro (ADMELOG) corrective regimen sliding scale   SubCutaneous every 6 hours  insulin NPH human recombinant 12 Unit(s) SubCutaneous every 6 hours  labetalol 200 milliGRAM(s) Oral every 12 hours  nitroglycerin  Infusion 20 MICROgram(s)/Min (6 mL/Hr) IV Continuous <Continuous>  predniSONE   Tablet 40 milliGRAM(s) Oral daily  sodium chloride 3%  Inhalation 4 milliLiter(s) Inhalation every 6 hours    MEDICATIONS  (PRN):  acetaminophen   Oral Liquid .. 650 milliGRAM(s) Oral every 6 hours PRN Temp greater or equal to 38C (100.4F), Mild Pain (1 - 3)  dextrose Oral Gel 15 Gram(s) Oral once PRN Blood Glucose LESS THAN 70 milliGRAM(s)/deciliter    Vital Signs Last 24 Hrs  T(C): 37.1 (19 Aug 2023 08:00), Max: 37.3 (19 Aug 2023 04:00)  T(F): 98.8 (19 Aug 2023 08:00), Max: 99.2 (19 Aug 2023 04:00)  HR: 74 (19 Aug 2023 09:30) (60 - 82)  BP: 115/52 (18 Aug 2023 12:00) (115/52 - 151/33)  BP(mean): 61 (18 Aug 2023 12:00) (61 - 61)  RR: 14 (19 Aug 2023 09:30) (13 - 22)  SpO2: 100% (19 Aug 2023 09:30) (98% - 100%)    Parameters below as of 19 Aug 2023 09:00      O2 Concentration (%): 30  Mode: AC/ CMV (Assist Control/ Continuous Mandatory Ventilation)  RR (machine): 16  TV (machine): 360  FiO2: 30  PEEP: 5  ITime: 0.76  MAP: 11  PIP: 34    I&O's Summary    18 Aug 2023 07:01  -  19 Aug 2023 07:00  --------------------------------------------------------  IN: 660 mL / OUT: 0 mL / NET: 660 mL          Physical Exam:   GENERAL: NAD, well-groomed, well-developed  HEENT: MANDY/   Atraumatic, Normocephalic  ENMT: No tonsillar erythema, exudates, or enlargement; Moist mucous membranes, Good dentition, No lesions  NECK: Supple, No JVD, Normal thyroid  CHEST/LUNG: Clear to auscultaion-  CVS: Regular rate and rhythm; No murmurs, rubs, or gallops  GI: : Soft, Nontender, Nondistended; Bowel sounds present  NERVOUS SYSTEM: slightly awake  EXTREMITIES: mild edema edema  LYMPH: No lymphadenopathy noted  SKIN: No rashes or lesions  ENDOCRINOLOGY: No Thyromegaly  PSYCH: letahrgic    Labs:  ABG - ( 19 Aug 2023 00:20 )  pH, Arterial: 7.40  pH, Blood: x     /  pCO2: 41    /  pO2: 98    / HCO3: 25    / Base Excess: 0.5   /  SaO2: 99.4                                        7.6    14.80 )-----------( 237      ( 19 Aug 2023 00:20 )             23.4                         7.8    17.94 )-----------( 237      ( 18 Aug 2023 00:26 )             24.6                         8.4    17.89 )-----------( 243      ( 17 Aug 2023 00:15 )             26.7                         8.5    16.25 )-----------( 232      ( 16 Aug 2023 00:08 )             26.3                         8.2    13.88 )-----------( 214      ( 15 Aug 2023 12:00 )             25.4     08-19    133<L>  |  96<L>  |  100<H>  ----------------------------<  251<H>  4.0   |  24  |  4.80<H>  08-18    134<L>  |  96<L>  |  90<H>  ----------------------------<  172<H>  3.6   |  24  |  4.73<H>  08-17    134<L>  |  96<L>  |  79<H>  ----------------------------<  291<H>  3.5   |  24  |  4.63<H>  08-16    132<L>  |  95<L>  |  69<H>  ----------------------------<  240<H>  3.7   |  22  |  4.35<H>    Ca    10.9<H>      19 Aug 2023 00:20  Ca    10.5      18 Aug 2023 00:26  Phos  3.4     08-19  Phos  3.5     08-18  Mg     3.30     08-19  Mg     3.30     08-18    TPro  5.6<L>  /  Alb  2.3<L>  /  TBili  0.2  /  DBili  x   /  AST  36<H>  /  ALT  9   /  AlkPhos  249<H>  08-19  TPro  5.4<L>  /  Alb  2.3<L>  /  TBili  <0.2  /  DBili  x   /  AST  30  /  ALT  5   /  AlkPhos  211<H>  08-18    CAPILLARY BLOOD GLUCOSE      POCT Blood Glucose.: 312 mg/dL (19 Aug 2023 05:19)  POCT Blood Glucose.: 251 mg/dL (19 Aug 2023 00:19)  POCT Blood Glucose.: 215 mg/dL (18 Aug 2023 17:23)  POCT Blood Glucose.: 213 mg/dL (18 Aug 2023 11:40)      LIVER FUNCTIONS - ( 19 Aug 2023 00:20 )  Alb: 2.3 g/dL / Pro: 5.6 g/dL / ALK PHOS: 249 U/L / ALT: 9 U/L / AST: 36 U/L / GGT: x             Urinalysis Basic - ( 19 Aug 2023 00:20 )    Color: x / Appearance: x / SG: x / pH: x  Gluc: 251 mg/dL / Ketone: x  / Bili: x / Urobili: x   Blood: x / Protein: x / Nitrite: x   Leuk Esterase: x / RBC: x / WBC x   Sq Epi: x / Non Sq Epi: x / Bacteria: x            RECENT CULTURES:  08-16 @ 20:46 .Blood Blood-Peripheral                No growth at 48 Hours    08-16 @ 20:13 .Blood Blood-Peripheral            rad< from: MR Head No Cont (08.17.23 @ 19:58) >      INTERPRETATION:  Clinical Indication: Altered mental status    Technique: Multi-planar, multi-squence noncontrast brain MRI was   performed.    Comparison: 8/15/2023    Findings:  Diagnostic accuracy is limited secondary to patient motion.  There is an acute infarct involving the right cerebellum. There is no   evidence of acute intracranial hemorrhage. Chronic left PCA territory   infarct with associated ex vacuo dilatation of the occipital horn of the   left lateral ventricle. Ventricles and sulci are prominent consistent   with age-related parenchymal volume loss. No midline shift or other   significant mass effect is noted. Gray-white differentiation is   maintained throughout. Normal flow voids are maintained in the dominant   arterial and venous structures. There are patchy and confluent foci of   hyperintense T2 signal within the subcortical and periventricular white   matter which are nonspecific but likely related to chronic microvascular   ischemic disease.    Moderate mucosal thickening of the paranasal sinuses. The tympanomastoid   spaces are clear. Orbits and orbital contents are unremarkable.    IMPRESSION:    MOTION LIMITED STUDY. ACUTE INFARCT INVOLVING THE RIGHT CEREBELLUM. THERE   IS NO EVIDENCE OF ACUTE INTRACRANIAL HEMORRHAGE.    --- End of Report ---            LUCIA PHAM MD; Attending Radiologist  This document has been electronically signed. Aug 17 2023  8:41PM    < end of copied text >  < from: Xray Chest 1 View- PORTABLE-Urgent (Xray Chest 1 View- PORTABLE-Urgent .) (08.13.23 @ 07:09) >    ACC: [id number] EXAM:  XR CHEST PORTABLE URGENT 1V   ORDERED BY: NATTY ACKERMAN     PROCEDURE DATE:  08/13/2023          INTERPRETATION:  CLINICAL INFORMATION: Central line placement    TIME OF EXAMINATION: August 13 at 6:59 AM    EXAM: Portablechest    FINDINGS:  Since the last study, a left IJ line has been placed and its tip is at   the origin of the SVC. No complicating pneumothorax.    Endotracheal tube with tip just above the ines and enteric tube in   place.    Vascular congestion with right pleural thickening unchanged.    Heart is not enlarged with a loop recorder. No effusions visualized.        COMPARISON: August 12        IMPRESSION: Status post left IJ central line placement.    --- End of Report ---            LIZA WOODY MD; Attending Radiologist    < end of copied text >      No growth at 48 Hours    08-16 @ 15:00 .Sputum Sputum       Few polymorphonuclear leukocytes per low power field  Rare Squamous epithelial cells per low power field  Few Gram Positive Cocci in Clusters per oil power field           Normal Respiratory Cate present          RESPIRATORY CULTURES:          Studies  Chest X-RAY  CT SCAN Chest   Venous Dopplers: LE:   CT Abdomen  Others

## 2023-08-19 NOTE — PROGRESS NOTE ADULT - ATTENDING COMMENTS
73F with acute hypoxemic and hypercapnic respiratory failure with acute renal failure.  Off sedation, but remains encephalopathic.   Not tolerating SBT  Will need to discuss with renal regarding HD.

## 2023-08-19 NOTE — PROGRESS NOTE ADULT - SUBJECTIVE AND OBJECTIVE BOX
Subjective: Patient seen and examined. No new events except as noted.   Remains intubated in ICU.  Hypertensive on nitro gtt.  Daughter at bedside.     REVIEW OF SYSTEMS:  Unable to obtain.    MEDICATIONS:  MEDICATIONS  (STANDING):  albuterol/ipratropium for Nebulization 3 milliLiter(s) Nebulizer every 6 hours  aspirin  chewable 81 milliGRAM(s) Oral daily  atorvastatin 40 milliGRAM(s) Oral at bedtime  chlorhexidine 0.12% Liquid 15 milliLiter(s) Oral Mucosa every 12 hours  chlorhexidine 2% Cloths 1 Application(s) Topical daily  dextrose 5%. 1000 milliLiter(s) (100 mL/Hr) IV Continuous <Continuous>  dextrose 5%. 1000 milliLiter(s) (50 mL/Hr) IV Continuous <Continuous>  dextrose 50% Injectable 25 Gram(s) IV Push once  dextrose 50% Injectable 12.5 Gram(s) IV Push once  dextrose 50% Injectable 25 Gram(s) IV Push once  glucagon  Injectable 1 milliGRAM(s) IntraMuscular once  heparin   Injectable 5000 Unit(s) SubCutaneous every 8 hours  insulin lispro (ADMELOG) corrective regimen sliding scale   SubCutaneous every 6 hours  insulin NPH human recombinant 8 Unit(s) SubCutaneous every 6 hours  sodium chloride 3%  Inhalation 4 milliLiter(s) Inhalation every 6 hours    PHYSICAL EXAM:  Vital Signs Last 24 Hrs  T(C): 37.3 (19 Aug 2023 04:00), Max: 37.3 (19 Aug 2023 04:00)  T(F): 99.2 (19 Aug 2023 04:00), Max: 99.2 (19 Aug 2023 04:00)  HR: 69 (19 Aug 2023 07:58) (60 - 82)  BP: 115/52 (18 Aug 2023 12:00) (115/52 - 151/33)  BP(mean): 61 (18 Aug 2023 12:00) (61 - 61)  RR: 16 (19 Aug 2023 06:50) (13 - 22)  SpO2: 100% (19 Aug 2023 07:58) (98% - 100%)    Parameters below as of 19 Aug 2023 06:50  Patient On (Oxygen Delivery Method): ventilator    O2 Concentration (%): 30    I&O's Summary    18 Aug 2023 07:01  -  19 Aug 2023 07:00  --------------------------------------------------------  IN: 660 mL / OUT: 0 mL / NET: 660 mL    Appearance: NAD, intubated, sedated	  HEENT: dry oral mucosa, LIJ AMBROCIO   Lymphatic: No lymphadenopathy  Cardiovascular: Normal S1 S2, No JVD, No murmurs, No edema  Respiratory: Decreased BS, intubated on ventilator	  Psychiatry: A & O x 0, sedated  Gastrointestinal: Soft, Non-tender, + BS, + OGT	  Skin: No rashes, No ecchymoses, No cyanosis	  Extremities: No strength/ROM 2/2 sedation, + BL LE edema  Vascular: Peripheral pulses palpable 2+ bilaterally  +willard    Mode: AC/ CMV (Assist Control/ Continuous Mandatory Ventilation), RR (machine): 14, TV (machine): 360, FiO2: 30, PEEP: 5, MAP: 12, PIP: 43    LABS:    CARDIAC MARKERS:                         7.6    14.80 )-----------( 237      ( 19 Aug 2023 00:20 )             23.4     08-19    133<L>  |  96<L>  |  100<H>  ----------------------------<  251<H>  4.0   |  24  |  4.80<H>    Ca    10.9<H>      19 Aug 2023 00:20  Phos  3.4     08-19  Mg     3.30     08-19    TPro  5.6<L>  /  Alb  2.3<L>  /  TBili  0.2  /  DBili  x   /  AST  36<H>  /  ALT  9   /  AlkPhos  249<H>  08-19    TELEMETRY:  SR   ECG:  	  RADIOLOGY:   DIAGNOSTIC TESTING:  [ ] Echocardiogram:  [ ]  Catheterization:  [ ] Stress Test:    OTHER:

## 2023-08-19 NOTE — PROGRESS NOTE ADULT - ASSESSMENT
72 y/o F DM on insulin, HTN, CKD, CHFpEF, breast CA, respiratory arrest and cardiac arrest (2018), CVA with residual weakness, aspiration PNA h/o trache, PEG, both removed presents with respiratory distress requiring intubation. May be volume overload i/s/o CHF/CKD vs decrease respiratory drive (given oxygen at home). Improving mental status however worsening renal function and anuria, concerning for ATN vs AIN.     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 No Hx Afib   - will pursue STEPHANIE  - avoid hypertension with BP < 180 systolic   - holding ASA for possible kidney biopsy planned for 8/22   - c/w statin     Cardiac   #Shock  - Likely mixed septic and vasoplegic from prop  - weaned vaso and levo, now HDS  - d/c midodrine for hypertension  - started coreg 612.5 BID     #CHFpEF   TTE 7/2023 with EF 59%, with severe LVH and diastolic dysfunction   pro-BNP > 61191, trop 78   Repeat TTE with EF 60% normal systolic and grade 1 diastolic dysfunction   - c/w coreg    Pulmonary   #Acute hypercapnic and hypoxemic respiratory failure   DDx: aspiration vs volume overload vs sedating medication decreasing drive (was given nyquil)   VBG with respiratory acidosis pCO2 111  CXR with small right pleural effusion, no consolidations/opacities  - c/w mechanical ventilation   - monitor blood gas while on vent      /Renal   #RUSS on CKD   Ddx: obstructive (willard in, no hydro on POCUS) vs low flow state vs AIN i/s/o cephalosporin use and drug rash   Urinalysis with 7 WBC, would not pursue steroids at this time per nephro  Worsening creatinine still but no urgent indication for HD, ATN   - kidney biopsy to r/o AIN   - trialed diuretics without improvement   - Continue to monitor, dose meds per eGFR. avoid nephrotoxic agents    #Hyperkalemia with EKG changes    7.2, hemolyzed, given insulin and D50 + calcium gluc   Likely i/s/o renal dysfunction   Received lasix 80 and 40 IV initially but without adequate response   Given bumex 2mg the morning of 8/12  Per nephro, started on bumex gtt with worsening creatinine   - decreasing urine output, now oliguric   - off bumex gtt       GI  Diet: NPO with tube feeds     Endocrine  #T2DM   A1C 7.1 in 7/2023   - c/w NPH to 8U q6 with mod SS   - BG goal 140-200     ID   No fever/leukocytosis, recheck temp   Hx latent TB which was treated, no concern for TB   Started on empiric vancomycin and aztreonam (cefepime/zosyn allergy? developed rash req prednisone)   Trialed cefepime --> cefazolin (sputum MSSA), developed drug eruption - discontinued   Blood and urine cultures 8/11 demonstrating no growth currently  Rising leukocytosis with low grade temp  - start empiric antibx if febrile > 101  - BC NGTD  - willard removed     Heme/Onc  ppx: heparin q8 hrs     Ethics  GOC - Per previous GOC with daughter and , reported that they have not talked about end of life care with the patient. For now, they wanted "everything to be done" including CPR, continuing with mech ventilation/intubation, pressors   72 y/o F DM on insulin, HTN, CKD, CHFpEF, breast CA, respiratory arrest and cardiac arrest (2018), CVA with residual weakness, aspiration PNA h/o trache, PEG, both removed presents with respiratory distress requiring intubation. May be volume overload i/s/o CHF/CKD vs decrease respiratory drive (given oxygen at home). Improving mental status however worsening renal function and anuria, concerning for ATN vs AIN.     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 No Hx Afib or documented   STEPHANIE with no TRINITY thrombus or intracardiac shunts   - avoid hypertension with BP < 180 systolic   - holding ASA for possible kidney biopsy planned for 8/22   - c/w statin     Cardiac   #Shock  #Hypertension   - Initially vasoplegic from prop  - weaned vaso and levo, now hypertensive likely from fluid overload with renal dysfunction   - changed coreg to labetalol 200 TID for BP control given CVA   - on nitro drip     #CHFpEF   TTE 7/2023 with EF 59%, with severe LVH and diastolic dysfunction   pro-BNP > 68910, trop 78   Repeat TTE with EF 60% normal systolic and grade 1 diastolic dysfunction     Pulmonary   #Acute hypercapnic and hypoxemic respiratory failure   DDx: aspiration vs volume overload vs sedating medication decreasing drive (was given nyquil)   VBG with respiratory acidosis pCO2 111  CXR with small right pleural effusion, no consolidations/opacities  - c/w mechanical ventilation   - monitor blood gas while on vent      /Renal   #RUSS on CKD   Ddx: obstructive (willard in, no hydro on POCUS) vs low flow state vs AIN i/s/o cephalosporin use and drug rash   Urinalysis with 7 WBC, would not pursue steroids at this time per nephro  Worsening creatinine still but no urgent indication for HD, ATN   - kidney biopsy to r/o AIN planned 8/22   - started empiric prednisone 40mg 8/18   - trialed diuretics without improvement   - Continue to monitor, dose meds per eGFR. avoid nephrotoxic agents  - considering HD today, although not urgent     #Hyperkalemia with EKG changes    7.2, hemolyzed, given insulin and D50 + calcium gluc   Likely i/s/o renal dysfunction   Received lasix 80 and 40 IV initially but without adequate response   Given bumex 2mg the morning of 8/12  Per nephro, started on bumex gtt with worsening creatinine   - decreasing urine output, now oliguric   - off bumex gtt       GI  Diet: NPO with tube feeds     Endocrine  #T2DM   A1C 7.1 in 7/2023   - c/w NPH to 8U q6 with mod SS   - BG goal 140-200     ID   No fever/leukocytosis, recheck temp   Hx latent TB which was treated, no concern for TB   Started on empiric vancomycin and aztreonam (cefepime/zosyn allergy? developed rash req prednisone)   Trialed cefepime --> cefazolin (sputum MSSA), developed drug eruption - discontinued   Blood and urine cultures 8/11 demonstrating no growth currently  Rising leukocytosis with low grade temp  - start empiric antibx if febrile > 101  - BC NGTD  - willard removed     Heme/Onc  ppx: heparin q8 hrs     Ethics  GOC - Per previous GOC with daughter and , reported that they have not talked about end of life care with the patient. For now, they wanted "everything to be done" including CPR, continuing with mech ventilation/intubation, pressors

## 2023-08-19 NOTE — PROGRESS NOTE ADULT - ATTENDING COMMENTS
73F with acute hypoxemic and hypercapnic respiratory failure with acute renal failure.  Had 1 session of HD yesterday.  Improved uremia.  SAT/SBT as tolerated.  Increase labetalol for BP control. 73F with acute hypoxemic and hypercapnic respiratory failure with acute renal failure.  Not tolerated SBT trials.  Discuss with nephrology regarding HD.  BP control

## 2023-08-19 NOTE — PROGRESS NOTE ADULT - SUBJECTIVE AND OBJECTIVE BOX
Patient is a 73y old  Female who presents with a chief complaint of Respiratory distress (19 Aug 2023 10:14)      SUBJECTIVE / OVERNIGHT EVENTS: remains intubated in mICU, more responsive, worsening renal function, almost anuric, fluid overloaded, BP elevated, on nitro drip, labetalol, on empiric steroids for AIN , no need for immediate HD, off sedation, off pressors,     MEDICATIONS  (STANDING):  albuterol/ipratropium for Nebulization 3 milliLiter(s) Nebulizer every 6 hours  atorvastatin 40 milliGRAM(s) Oral at bedtime  chlorhexidine 0.12% Liquid 15 milliLiter(s) Oral Mucosa every 12 hours  chlorhexidine 2% Cloths 1 Application(s) Topical daily  dexMEDEtomidine Infusion 0.1 MICROgram(s)/kG/Hr (1.66 mL/Hr) IV Continuous <Continuous>  dextrose 5%. 1000 milliLiter(s) (50 mL/Hr) IV Continuous <Continuous>  dextrose 5%. 1000 milliLiter(s) (100 mL/Hr) IV Continuous <Continuous>  dextrose 50% Injectable 12.5 Gram(s) IV Push once  dextrose 50% Injectable 25 Gram(s) IV Push once  dextrose 50% Injectable 25 Gram(s) IV Push once  glucagon  Injectable 1 milliGRAM(s) IntraMuscular once  heparin   Injectable 5000 Unit(s) SubCutaneous every 8 hours  insulin lispro (ADMELOG) corrective regimen sliding scale   SubCutaneous every 6 hours  insulin NPH human recombinant 12 Unit(s) SubCutaneous every 6 hours  labetalol 200 milliGRAM(s) Oral three times a day  nitroglycerin  Infusion 20 MICROgram(s)/Min (6 mL/Hr) IV Continuous <Continuous>  predniSONE   Tablet 40 milliGRAM(s) Oral daily  sodium chloride 3%  Inhalation 4 milliLiter(s) Inhalation every 6 hours    MEDICATIONS  (PRN):  acetaminophen   Oral Liquid .. 650 milliGRAM(s) Oral every 6 hours PRN Temp greater or equal to 38C (100.4F), Mild Pain (1 - 3)  dextrose Oral Gel 15 Gram(s) Oral once PRN Blood Glucose LESS THAN 70 milliGRAM(s)/deciliter      Vital Signs Last 24 Hrs  T(F): 98.8 (08-19-23 @ 12:00), Max: 99.2 (08-19-23 @ 04:00)  HR: 72 (08-19-23 @ 16:00) (61 - 87)  BP: --  RR: 22 (08-19-23 @ 16:00) (12 - 28)  SpO2: 98% (08-19-23 @ 16:00) (93% - 100%)  Telemetry:   CAPILLARY BLOOD GLUCOSE      POCT Blood Glucose.: 368 mg/dL (19 Aug 2023 11:36)  POCT Blood Glucose.: 312 mg/dL (19 Aug 2023 05:19)  POCT Blood Glucose.: 251 mg/dL (19 Aug 2023 00:19)  POCT Blood Glucose.: 215 mg/dL (18 Aug 2023 17:23)    I&O's Summary    18 Aug 2023 07:01  -  19 Aug 2023 07:00  --------------------------------------------------------  IN: 660 mL / OUT: 0 mL / NET: 660 mL    19 Aug 2023 07:01  -  19 Aug 2023 16:18  --------------------------------------------------------  IN: 431 mL / OUT: 0 mL / NET: 431 mL        PHYSICAL EXAM:  GENERAL: NAD, well-developed  HEAD:  Atraumatic, Normocephalic  EYES: EOMI, PERRLA, conjunctiva and sclera clear  NECK: Supple, No JVD  CHEST/LUNG: Clear to auscultation bilaterally; No wheeze  HEART: Regular rate and rhythm; No murmurs, rubs, or gallops  ABDOMEN: Soft, Nontender, Nondistended; Bowel sounds present  EXTREMITIES:  2+ Peripheral Pulses, No clubbing, cyanosis, or edema  PSYCH: AAOx3  NEUROLOGY: non-focal  SKIN: No rashes or lesions    LABS:                        7.6    14.80 )-----------( 237      ( 19 Aug 2023 00:20 )             23.4     08-19    133<L>  |  96<L>  |  100<H>  ----------------------------<  251<H>  4.0   |  24  |  4.80<H>    Ca    10.9<H>      19 Aug 2023 00:20  Phos  3.4     08-19  Mg     3.30     08-19    TPro  5.6<L>  /  Alb  2.3<L>  /  TBili  0.2  /  DBili  x   /  AST  36<H>  /  ALT  9   /  AlkPhos  249<H>  08-19          Urinalysis Basic - ( 19 Aug 2023 00:20 )    Color: x / Appearance: x / SG: x / pH: x  Gluc: 251 mg/dL / Ketone: x  / Bili: x / Urobili: x   Blood: x / Protein: x / Nitrite: x   Leuk Esterase: x / RBC: x / WBC x   Sq Epi: x / Non Sq Epi: x / Bacteria: x        RADIOLOGY & ADDITIONAL TESTS:    Imaging Personally Reviewed:    Consultant(s) Notes Reviewed:      Care Discussed with Consultants/Other Providers:

## 2023-08-19 NOTE — PROGRESS NOTE ADULT - ASSESSMENT
73F w/ HTN, DM2, CVA, breast CA-bilateral mastectomy, recurrent aspiration pneumonia/respiratory failure, and CKD, 8/11/23 p/w acute hypercapnic respiratory failure; c/b RUSS    (1)Renal - CKD4     (2)RUSS - most likely ischemic ATN; less likely but possibly AIN; borderline anuric, and creatinine continuing to rise. No urgent need for HD for now    (3)Hypercalcemia     (4)Pulm- hypercapnic respiratory failure on admission - remains intubated; (+)pleural effusions    (5)ID - PNA - s/p IV abx    (6)CV - hypertensive - now on Coreg and labetolol           RECOMMEND:  (1)Steroids for potential AIN  (2)No ASA for now in anticipation of potential IR-guided renal biopsy next week  (3)Increase labetolol to tid dosing and dc Coreg.  Start Norvasc 5mg po qd    (4)Can give IV diuretics to augment urine output   (5)No HD just yet - Check PTH VIt D TSH and SPEP please     Counseled  in depth, again, at bedside.    Dr Colon did chat with Dr Lopez as well     >60 minutes spent on total encounter and more then 50% of the visit was spent counseling and/or coordinating care by the attending physician   Critically sick and unstable     [facility]   [phone]

## 2023-08-20 NOTE — PROGRESS NOTE ADULT - SUBJECTIVE AND OBJECTIVE BOX
Subjective: Patient seen and examined. No new events except as noted.   remains in ICU      REVIEW OF SYSTEMS:    Unable to obtain     MEDICATIONS:  MEDICATIONS  (STANDING):  albuterol/ipratropium for Nebulization 3 milliLiter(s) Nebulizer every 6 hours  atorvastatin 40 milliGRAM(s) Oral at bedtime  chlorhexidine 0.12% Liquid 15 milliLiter(s) Oral Mucosa every 12 hours  chlorhexidine 2% Cloths 1 Application(s) Topical daily  dexMEDEtomidine Infusion 0.1 MICROgram(s)/kG/Hr (1.66 mL/Hr) IV Continuous <Continuous>  dextrose 5%. 1000 milliLiter(s) (50 mL/Hr) IV Continuous <Continuous>  dextrose 5%. 1000 milliLiter(s) (100 mL/Hr) IV Continuous <Continuous>  dextrose 50% Injectable 12.5 Gram(s) IV Push once  dextrose 50% Injectable 25 Gram(s) IV Push once  dextrose 50% Injectable 25 Gram(s) IV Push once  glucagon  Injectable 1 milliGRAM(s) IntraMuscular once  heparin   Injectable 5000 Unit(s) SubCutaneous every 8 hours  insulin lispro (ADMELOG) corrective regimen sliding scale   SubCutaneous every 6 hours  insulin NPH human recombinant 12 Unit(s) SubCutaneous every 6 hours  labetalol 300 milliGRAM(s) Oral three times a day  nitroglycerin  Infusion 20 MICROgram(s)/Min (6 mL/Hr) IV Continuous <Continuous>  predniSONE   Tablet 40 milliGRAM(s) Oral daily  sodium chloride 3%  Inhalation 4 milliLiter(s) Inhalation every 6 hours      PHYSICAL EXAM:  T(C): 36.6 (08-20-23 @ 04:00), Max: 37.1 (08-19-23 @ 12:00)  HR: 65 (08-20-23 @ 07:41) (58 - 87)  BP: --  RR: 15 (08-20-23 @ 06:00) (12 - 28)  SpO2: 100% (08-20-23 @ 07:41) (93% - 100%)  Wt(kg): --  I&O's Summary    19 Aug 2023 07:01  -  20 Aug 2023 07:00  --------------------------------------------------------  IN: 2111.2 mL / OUT: 900 mL / NET: 1211.2 mL          Appearance: Normal	  HEENT:   Normal oral mucosa, PERRL, EOMI	  Lymphatic: No lymphadenopathy , no edema  Cardiovascular: Normal S1 S2, No JVD, No murmurs , Peripheral pulses palpable 2+ bilaterally  Respiratory: Lungs clear to auscultation, normal effort 	  Gastrointestinal:  Soft, Non-tender, + BS	  Skin: No rashes, No ecchymoses, No cyanosis, warm to touch  Musculoskeletal: Normal range of motion, normal strength  Psychiatry:  Mood & affect appropriate  Ext: No edema    Mode: AC/ CMV (Assist Control/ Continuous Mandatory Ventilation), RR (machine): 12, TV (machine): 360, FiO2: 30, PEEP: 5, ITime: 0.73, MAP: 8, PIP: 29  Plateau pressure:   P/F ratio:   LABS:    CARDIAC MARKERS:    Blood Gas Profile - Arterial (08.20.23 @ 00:41)   pH, Arterial: 7.43  pCO2, Arterial: 45 mmHg  pO2, Arterial: 142 mmHg  HCO3, Arterial: 30 mmol/L  Base Excess, Arterial: 5.0 mmol/L  Oxygen Saturation, Arterial: 98.4 %  Total CO2, Arterial: 31 mmol/L  FIO2, Arterial: 30                            7.4    16.97 )-----------( 302      ( 20 Aug 2023 00:41 )             23.0     08-20    139  |  101  |  47<H>  ----------------------------<  213<H>  3.1<L>   |  28  |  2.42<H>    Ca    9.2      20 Aug 2023 00:41  Phos  2.8     08-20  Mg     2.50     08-20    TPro  6.5  /  Alb  2.7<L>  /  TBili  0.2  /  DBili  x   /  AST  26  /  ALT  5   /  AlkPhos  226<H>  08-20    proBNP:   Lipid Profile:   HgA1c:   TSH:             TELEMETRY: 	SR    ECG:  	  RADIOLOGY:  < from: Xray Chest 1 View- PORTABLE-Urgent (Xray Chest 1 View- PORTABLE-Urgent .) (08.19.23 @ 18:37) >  ******PRELIMINARY REPORT******      ******PRELIMINARY REPORT******         ACC: [id number] EXAM:  XR CHEST PORTABLE URGENT 1V   ORDERED BY: JAMIL SANCHEZ     PROCEDURE DATE:  08/19/2023    ******PRELIMINARY REPORT******      ******PRELIMINARYREPORT******           INTERPRETATION:  left IJ with tip is at distal brachiocephalic   vein/proximal SVC. R IJ with tip in the SVC.        ******PRELIMINARY REPORT******      ******PRELIMINARY REPORT******       BESSIE GRAVES MD; Resident Radiologist  This document is a PRELIMINARY interpretation and is pending final   attending approval. Aug 19 2023  8:32PM    < end of copied text >    DIAGNOSTIC TESTING:  [ ] Echocardiogram:  [ ]  Catheterization:  [ ] Stress Test:    OTHER:

## 2023-08-20 NOTE — PROGRESS NOTE ADULT - ASSESSMENT
72 y/o F DM on insulin, HTN, CKD, CHFpEF, breast CA, respiratory arrest and cardiac arrest (2018), CVA with residual weakness, aspiration PNA h/o trache, PEG, both removed presents with respiratory distress requiring intubation. Found to have elevated BNP, may be 2/2 to volume overload i/s/o CKD vs CHF.     Neuro ;  - Sedated : Baseline MS NELSONx3   -WAS ON PROPOFOL AND FENTANYL BEFORE: NOW OFF:    - Monitor her mental status:  requiring only 30% fio2:    -8/15: - now she has been off sedation for more then 24 hours but is still lethargic: her o2 requirement has not increased:   -for possible extubation if she wakes up   8/16:: -dw PMD: pt is still lethargic  for MRI brain stem today   8/17": -had ct head: OK: awaiting MRI brain : still lethargic  8/18: seemed same to me: family at bedside who says she is little  bit more awake: oxygen requirement is same:  at 30%:off vasopressors   -now neuro note reviewed:  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 reason for inability ot trigger vent and co2 retention initially ? candidate for trach    -now the co2 i s normal : not much improvement in her mental statis:  off sedation for days   8/20: still lethargic: on precedex;  cxr reviewed:  last time at [facility] she was found to have cavity in RUL : she was ruled out for tb : her AFB was postive but was MAC /; defer to MICU   CVA   - cont aspirin and statin   -per neurology   Cardiac   -CHFpEF : TTE 7/2023 with EF 59%, with severe LVH and diastolic dysfunction : pro-BNP > 16014, trop 78   -on bumex drip :  -cards following   8/15: : she is off bumex drip and is on midodrine   8/16: remains off bumex  8/18:: off bumex:  defer to MICU team   cont to remain off diuretics   8/20: she seems same to me: in renal failure off diuretics   Pulmonary   -Acute hypercapnic and hypoxemic respiratory failure   -DDx: aspiration vs volume overload  VBG with respiratory acidosis pCO2 111  - CXR with small right pleural effusion, no consolidations/opacities  -now she seems better:  fio2: 30$:  -? etiology of hypercarbia:  multifactorial : seems to have OHS and TYRONE superimposed by acute chf  ? and aspiration pneumonia:"   -pt is mostly bed bound:   -it is possible that this time:  she mght need bipap on dc : atleast at the night time:   -on 8/15:  still remains intubated  8/16:still lethargic:  not extubated hypercarbic acidotic today on abg:  weaning trials :probably would need bipap following extubation  8/17: :still lethargic: :awaiting mri brain : dw    8/18: :MRI done has new right cerebellar infarct on mri : defer to MICU   -seen by bora now:  has brain stem infarct to oper neuro note:  reason for being vent;    /Renal   -Hyperkalemia with EKG changes  : 7.2, hemolyzed, given insulin and D50 + calcium gluc   -K is better today : cont to monitor  -normal today on 8/15   8/16:stable  8/17:-k has been ok for l ast few days  8/18: stable  8/19: started on prednisone yesterday for suspected AIN by renal    8/20: wbc is high : likely secondary to steroids  she has no fveR:  ruled out for tb last time   GI NPO for now  Endocrine  T2DM : A1C 7.1 in 7/2023   -cont to monitor blood glucose  ID   - No fever/leukocytosis  - Hx latent TB which was treated, no concern for TB  : She was recently ruled out for tuberculosis at [facility]   - Started on empiric vancomycin and aztreonam (cefepime/zosyn allergy? developed rash req prednisone)  : now dced:   defer to MICU   - f/u MRSA   - follow up blood culture/urine   8/16:blood cultures negative so far: legionella is negative:  remains off antibiotics  staph aureus on sputum   8/17:: off antibiotics at this time: secondary to drug eruption  8/18: remains off antibiotics   -8/19: off diuretics   8/20: off antibiotics     dw micu and  at bedside  overall prognosis seems guarded

## 2023-08-20 NOTE — PROGRESS NOTE ADULT - SUBJECTIVE AND OBJECTIVE BOX
Date of Service: 08-20-23 @ 10:48    Patient is a 73y old  Female who presents with a chief complaint of Respiratory distress (20 Aug 2023 09:01)      Any change in ROS: she seems same to me:  still lethargic:   intubated:   on precedex      MEDICATIONS  (STANDING):  albuterol/ipratropium for Nebulization 3 milliLiter(s) Nebulizer every 8 hours  atorvastatin 40 milliGRAM(s) Oral at bedtime  chlorhexidine 0.12% Liquid 15 milliLiter(s) Oral Mucosa every 12 hours  chlorhexidine 2% Cloths 1 Application(s) Topical daily  dexMEDEtomidine Infusion 0.1 MICROgram(s)/kG/Hr (1.66 mL/Hr) IV Continuous <Continuous>  dextrose 5%. 1000 milliLiter(s) (50 mL/Hr) IV Continuous <Continuous>  dextrose 5%. 1000 milliLiter(s) (100 mL/Hr) IV Continuous <Continuous>  dextrose 50% Injectable 25 Gram(s) IV Push once  dextrose 50% Injectable 25 Gram(s) IV Push once  dextrose 50% Injectable 12.5 Gram(s) IV Push once  glucagon  Injectable 1 milliGRAM(s) IntraMuscular once  heparin   Injectable 5000 Unit(s) SubCutaneous every 8 hours  insulin lispro (ADMELOG) corrective regimen sliding scale   SubCutaneous every 6 hours  insulin NPH human recombinant 12 Unit(s) SubCutaneous every 6 hours  labetalol 300 milliGRAM(s) Oral three times a day  nitroglycerin  Infusion 20 MICROgram(s)/Min (6 mL/Hr) IV Continuous <Continuous>  predniSONE   Tablet 40 milliGRAM(s) Oral daily  sodium chloride 3%  Inhalation 4 milliLiter(s) Inhalation every 6 hours    MEDICATIONS  (PRN):  acetaminophen   Oral Liquid .. 650 milliGRAM(s) Oral every 6 hours PRN Temp greater or equal to 38C (100.4F), Mild Pain (1 - 3)  dextrose Oral Gel 15 Gram(s) Oral once PRN Blood Glucose LESS THAN 70 milliGRAM(s)/deciliter    Vital Signs Last 24 Hrs  T(C): 37.1 (20 Aug 2023 08:00), Max: 37.1 (19 Aug 2023 12:00)  T(F): 98.7 (20 Aug 2023 08:00), Max: 98.8 (19 Aug 2023 12:00)  HR: 62 (20 Aug 2023 10:07) (58 - 87)  BP: --  BP(mean): --  RR: 22 (20 Aug 2023 10:00) (12 - 28)  SpO2: 100% (20 Aug 2023 10:07) (93% - 100%)    Parameters below as of 20 Aug 2023 10:07  Patient On (Oxygen Delivery Method): ventilator      Mode: AC/ CMV (Assist Control/ Continuous Mandatory Ventilation)  RR (machine): 12  TV (machine): 360  FiO2: 30  PEEP: 5  ITime: 0.75  MAP: 8  PIP: 35    I&O's Summary    19 Aug 2023 07:01  -  20 Aug 2023 07:00  --------------------------------------------------------  IN: 2111.2 mL / OUT: 900 mL / NET: 1211.2 mL    20 Aug 2023 07:01  -  20 Aug 2023 10:48  --------------------------------------------------------  IN: 361.8 mL / OUT: 0 mL / NET: 361.8 mL          Physical Exam:   GENERAL: NAD, well-groomed, well-developed  HEENT: MANDY/   Atraumatic, Normocephalic  ENMT: No tonsillar erythema, exudates, or enlargement; Moist mucous membranes, Good dentition, No lesions  NECK: Supple, No JVD, Normal thyroid  CHEST/LUNG: Clear to auscultaion, ; No rales, rhonchi, wheezing, or rubs  CVS: Regular rate and rhythm; No murmurs, rubs, or gallops  GI: : Soft, Nontender, Nondistended; Bowel sounds present  NERVOUS SYSTEM:  Lethargic,  intubated:   EXTREMITIES:  mild  edema  LYMPH: No lymphadenopathy noted  SKIN: No rashes or lesions  ENDOCRINOLOGY: No Thyromegaly  PSYCH: sedated  : lethargic    Labs:  ABG - ( 20 Aug 2023 00:41 )  pH, Arterial: 7.43  pH, Blood: x     /  pCO2: 45    /  pO2: 142   / HCO3: 30    / Base Excess: 5.0   /  SaO2: 98.4                                        7.4    16.97 )-----------( 302      ( 20 Aug 2023 00:41 )             23.0                         7.6    14.80 )-----------( 237      ( 19 Aug 2023 00:20 )             23.4                         7.8    17.94 )-----------( 237      ( 18 Aug 2023 00:26 )             24.6                         8.4    17.89 )-----------( 243      ( 17 Aug 2023 00:15 )             26.7     08-20    139  |  101  |  47<H>  ----------------------------<  213<H>  3.1<L>   |  28  |  2.42<H>  08-19    133<L>  |  96<L>  |  100<H>  ----------------------------<  251<H>  4.0   |  24  |  4.80<H>  08-18    134<L>  |  96<L>  |  90<H>  ----------------------------<  172<H>  3.6   |  24  |  4.73<H>  08-17    134<L>  |  96<L>  |  79<H>  ----------------------------<  291<H>  3.5   |  24  |  4.63<H>    Ca    9.2      20 Aug 2023 00:41  Ca    10.9<H>      19 Aug 2023 00:20  Phos  2.8     08-20  Phos  3.4     08-19  Mg     2.50     08-20  Mg     3.30     08-19    TPro  6.5  /  Alb  2.7<L>  /  TBili  0.2  /  DBili  x   /  AST  26  /  ALT  5   /  AlkPhos  226<H>  08-20  TPro  5.6<L>  /  Alb  2.3<L>  /  TBili  0.2  /  DBili  x   /  AST  36<H>  /  ALT  9   /  AlkPhos  249<H>  08-19  TPro  5.4<L>  /  Alb  2.3<L>  /  TBili  <0.2  /  DBili  x   /  AST  30  /  ALT  5   /  AlkPhos  211<H>  08-18    CAPILLARY BLOOD GLUCOSE      POCT Blood Glucose.: 191 mg/dL (20 Aug 2023 05:36)  POCT Blood Glucose.: 211 mg/dL (20 Aug 2023 00:39)  POCT Blood Glucose.: 322 mg/dL (19 Aug 2023 17:52)  POCT Blood Glucose.: 368 mg/dL (19 Aug 2023 11:36)      LIVER FUNCTIONS - ( 20 Aug 2023 00:41 )  Alb: 2.7 g/dL / Pro: 6.5 g/dL / ALK PHOS: 226 U/L / ALT: 5 U/L / AST: 26 U/L / GGT: x             Urinalysis Basic - ( 20 Aug 2023 00:41 )    Color: x / Appearance: x / SG: x / pH: x  Gluc: 213 mg/dL / Ketone: x  / Bili: x / Urobili: x   Blood: x / Protein: x / Nitrite: x   Leuk Esterase: x / RBC: x / WBC x   Sq Epi: x / Non Sq Epi: x / Bacteria: x            RECENT CULTURES:  08-16 @ 20:46 .Blood Blood-Peripheral                No growth at 72 Hours    08-16 @ 20:13 .Blood Blood-Peripheral       rd< from: Xray Chest 1 View- PORTABLE-Urgent (Xray Chest 1 View- PORTABLE-Urgent .) (08.19.23 @ 18:37) >    ******PRELIMINARY REPORT******      ******PRELIMINARY REPORT******         ACC: [id number] EXAM:  XR CHEST PORTABLE URGENT 1V   ORDERED BY: JAMIL SANCHEZ     PROCEDURE DATE:  08/19/2023    ******PRELIMINARY REPORT******      ******PRELIMINARYREPORT******           INTERPRETATION:  left IJ with tip is at distal brachiocephalic   vein/proximal SVC. R IJ with tip in the SVC.        ******PRELIMINARY REPORT******      ******PRELIMINARY REPORT******       BESSIE GRAVES MD; Resident Radiologist  This document is a PRELIMINARY interpretation and is pending final   attending approval. Aug 19 2023  8:32PM    < end of copied text >           No growth at 72 Hours    08-16 @ 15:00 .Sputum Sputum       Few polymorphonuclear leukocytes per low power field  Rare Squamous epithelial cells per low power field  Few Gram Positive Cocci in Clusters per oil power field           Normal Respiratory Cate present    ct< from: CT Chest No Cont (08.11.23 @ 13:19) >  e right pleural effusion. The right pleural effusion has increased   when compared to previous exam.    Below the diaphragm, visualized portions of the abdomen demonstrate   thickening of the left adrenal gland.    Degenerative changes of the spine are noted.    IMPRESSION: Small to moderate size right pleural effusion has increased   in size when compared to previous exam.    3.2 cm cyst/cavity in the right upper lobe as well as bronchiectasis and   tree-in-bud opacities within both lungs as described above have not   significantly changed when compared to previous exam.    --- End of Report ---            MARISA COOPER MD; Attending Radiologist  This document has been electronically signed. Aug 11 2023  1:38PM    < end of copied text >        RESPIRATORY CULTURES:          Studies  Chest X-RAY  CT SCAN Chest   Venous Dopplers: LE:   CT Abdomen  Others

## 2023-08-20 NOTE — PROGRESS NOTE ADULT - ASSESSMENT
intubated and vented  s/p #1 HD yesterday with 500ml net fluid loss  Afebrile  #2 HD tomorrow 8/21/23    updated  at bedside     acetaminophen   Oral Liquid .. 650 milliGRAM(s) Oral every 6 hours PRN  albuterol/ipratropium for Nebulization 3 milliLiter(s) Nebulizer every 8 hours  atorvastatin 40 milliGRAM(s) Oral at bedtime  chlorhexidine 0.12% Liquid 15 milliLiter(s) Oral Mucosa every 12 hours  chlorhexidine 2% Cloths 1 Application(s) Topical daily  dexMEDEtomidine Infusion 0.1 MICROgram(s)/kG/Hr IV Continuous <Continuous>  dextrose 5%. 1000 milliLiter(s) IV Continuous <Continuous>  dextrose 5%. 1000 milliLiter(s) IV Continuous <Continuous>  dextrose 50% Injectable 12.5 Gram(s) IV Push once  dextrose 50% Injectable 25 Gram(s) IV Push once  dextrose 50% Injectable 25 Gram(s) IV Push once  dextrose Oral Gel 15 Gram(s) Oral once PRN  glucagon  Injectable 1 milliGRAM(s) IntraMuscular once  heparin   Injectable 5000 Unit(s) SubCutaneous every 8 hours  insulin lispro (ADMELOG) corrective regimen sliding scale   SubCutaneous every 6 hours  insulin NPH human recombinant 12 Unit(s) SubCutaneous every 6 hours  labetalol 400 milliGRAM(s) Oral three times a day  nitroglycerin  Infusion 20 MICROgram(s)/Min IV Continuous <Continuous>  predniSONE   Tablet 40 milliGRAM(s) Oral daily  sodium chloride 3%  Inhalation 4 milliLiter(s) Inhalation every 6 hours      VITAL:  T(C): , Max: 37.1 (08-20-23 @ 08:00)  T(F): , Max: 98.7 (08-20-23 @ 08:00)  HR: 64 (08-20-23 @ 15:33)  BP: --  BP(mean): --  RR: 18 (08-20-23 @ 14:00)  SpO2: 100% (08-20-23 @ 15:33)  Wt(kg): --    08-19-23 @ 07:01  -  08-20-23 @ 07:00  --------------------------------------------------------  IN: 2111.2 mL / OUT: 900 mL / NET: 1211.2 mL    08-20-23 @ 07:01  -  08-20-23 @ 17:50  --------------------------------------------------------  IN: 842.2 mL / OUT: 0 mL / NET: 842.2 mL        PHYSICAL EXAM:    Constitutional: intubated   Neck:  No JVD  Respiratory: transmitted   Cardiovascular: S1 and S2  Gastrointestinal: BS+, soft, NT/ND  Extremities: No peripheral edema  Neurological:    : +  Wheeler  Skin: No rashes  Access: Not applicable    LABS:                          7.4    16.97 )-----------( 302      ( 20 Aug 2023 00:41 )             23.0     Na(139)/K(3.1)/Cl(101)/HCO3(28)/BUN(47)/Cr(2.42)Glu(213)/Ca(9.2)/Mg(2.50)/PO4(2.8)    08-20 @ 00:41  Na(133)/K(4.0)/Cl(96)/HCO3(24)/BUN(100)/Cr(4.80)Glu(251)/Ca(10.9)/Mg(3.30)/PO4(3.4)    08-19 @ 00:20  Na(134)/K(3.6)/Cl(96)/HCO3(24)/BUN(90)/Cr(4.73)Glu(172)/Ca(10.5)/Mg(3.30)/PO4(3.5)    08-18 @ 00:26    Urinalysis Basic - ( 20 Aug 2023 00:41 )    Color: x / Appearance: x / SG: x / pH: x  Gluc: 213 mg/dL / Ketone: x  / Bili: x / Urobili: x   Blood: x / Protein: x / Nitrite: x   Leuk Esterase: x / RBC: x / WBC x   Sq Epi: x / Non Sq Epi: x / Bacteria: x            ASSESSMENT/PLAN  73F w/ HTN, DM2, CVA, breast CA-bilateral mastectomy, recurrent aspiration pneumonia/respiratory failure, and CKD, 8/11/23 p/w acute hypercapnic respiratory failure; c/b RUSS    (1)Renal - CKD4     (2)RUSS - most likely ischemic ATN; now on HD; # 1 HD yesterday with 500ml  net fluid loss, # 2 HD Monday 8/21/23    (3)Hypocalcemia- warranting repletion; a/w  K+ chloride 10mEq IV x 1    (4)Hypercalcemia - improving s/p HD yesterday    (4)Pulm- hypercapnic respiratory failure on admission - remains intubated; (+)pleural effusions    (5)ID - PNA - s/p IV abx    (6)CV - hypertensive - on Labetalol           RECOMMEND:  (1)Next HD Monday  (2)No ASA for now in anticipation of potential IR-guided renal biopsy this  week  (3)Continue Labetalol tid   (4)Can give IV diuretics to augment urine output   (5) F/U  PTH, VIt D ,TSH and SPEP please         aNs Corona NP-BC  [place]  [phone]

## 2023-08-20 NOTE — PROGRESS NOTE ADULT - ASSESSMENT
74 y/o F well [facility] to me [facility]. she was admitted at [facility] 7/12-7/22 w aspiration PNA, was treated w CEFEPIME, developed an allergic rash,  dCHF, + MAC on AFB culture, had been progressively getting more and more lethargic and dyspneic at home since DC.   In  am of 8/11/23  ptn presented with respiratory distress w hypoxia and hypercarbia requiring intubation 2/2 volume overload +/- Asp PNA      Neuro   off sedation  well known to Dr. Bianchi, consult reviewed   Baseline MS AOx3, aphasic   - h/o CVA , on aspirin and statin . resumed w feeding tube  - eeg  2/2 tremors, no sz focus  - starting to become more responsive but not triggering the vent  - MRI 8/17:  new R cerebellar infarct, old left PCA/Occipital infarct. probably embolic in nature, did not tolerate full AC in the past. pending STEPHANIE.       Cardiac   Ptn well known to Dr. Dunn, consult reviewed   CHFpEF   TTE 7/2023 with EF 59%, with severe LVH and diastolic dysfunction   pro-BNP > 77274, trop 78       Pulmonary   Acute hypercapnic and hypoxemic respiratory failure   well known to Dr. Mcnulty, consult reviewed   DDx: aspiration vs volume overload   VBG with respiratory acidosis pCO2 111  CT chest: mod ( worsening) R pl effusion, h/o RUL cavity, +MAC  - cefepime DCed on 8/13, started on Moxifloxacin on 8/13, since 8/14 off ABx    Renal   Hyperkalemia with EKG changes  , initially treated w LOKELMA,  now resolved   Ptn well know to Dr. Colon, consult reviewed      worsening renal function, almost anuric, fluid overloaded, BP elevated, on nitro drip, labetalol, on empiric steroids for AIN ,   started HD 8/19, 500 cc removed next HD 8/21,   may need a renal biopsy 8/22    GI  NPO for now, on tube feeds  would start GI ppx w IV PPI    Endocrine  T2DM   A1C 7.1 in 7/2023   - BG goal 140-200     ID   No fever/leukocytosis, recheck temp   Hx latent TB which was treated, no concern for TB   - grew MAC on AFB culture from most recent admission. would call ID consult  Started on empiric vancomycin and aztreonam,  changed to cefepime 8/12, cefepime dced on 8/13(cefepime/zosyn allergy.  developed rash when on cefepime on previous admission ) . started on AVALOX on 8/13, off ABx on 8/14. ptn has an allergic rash, prob 2/2 cefepime  - f/u MRSA   - all cxs NTD    Heme/Onc  ppx: heparin q8 hrs     Ethics  GOC - Discussed GOC with daughter and , they have opted in the past for full code. and she remains full code at present

## 2023-08-20 NOTE — PROGRESS NOTE ADULT - SUBJECTIVE AND OBJECTIVE BOX
************************  Jimmy Gleason MD-PhD  Internal Medicine PGY-2  ************************    SUBJECTIVE:      Patient seen and examined at bedside. No events overnight, no acute complaints this morning. Patient with appropriate PO intake and urinating well with BM(s). Denies CP, SOB, fevers/chills, N/V, or abdominal pain. Patient reminded of ongoing careplan.      VITAL SIGNS:  ICU Vital Signs Last 24 Hrs  T(C): 36.6 (20 Aug 2023 04:00), Max: 37.1 (19 Aug 2023 08:00)  T(F): 97.9 (20 Aug 2023 04:00), Max: 98.8 (19 Aug 2023 08:00)  HR: 68 (20 Aug 2023 06:00) (58 - 87)  BP: --  BP(mean): --  ABP: 157/43 (20 Aug 2023 06:00) (124/35 - 202/54)  ABP(mean): 84 (20 Aug 2023 06:00) (65 - 336)  RR: 15 (20 Aug 2023 06:00) (12 - 28)  SpO2: 100% (20 Aug 2023 06:00) (93% - 100%)    O2 Parameters below as of 20 Aug 2023 06:00  Patient On (Oxygen Delivery Method): ventilator    O2 Concentration (%): 30      Mode: AC/ CMV (Assist Control/ Continuous Mandatory Ventilation), RR (machine): 12, TV (machine): 360, FiO2: 30, PEEP: 5, ITime: 0.73, MAP: 8, PIP: 29  Plateau pressure:   P/F ratio:     08-19 @ 07:01  -  08-20 @ 07:00  --------------------------------------------------------  IN: 2111.2 mL / OUT: 900 mL / NET: 1211.2 mL      CAPILLARY BLOOD GLUCOSE      POCT Blood Glucose.: 191 mg/dL (20 Aug 2023 05:36)    ECG:     PHYSICAL EXAM:    08-19 @ 07:01  -  08-20 @ 07:00  --------------------------------------------------------  IN: 2111.2 mL / OUT: 900 mL / NET: 1211.2 mL    Vital Signs Last 24 Hrs  T(C): 36.6 (20 Aug 2023 04:00), Max: 37.1 (19 Aug 2023 08:00)  T(F): 97.9 (20 Aug 2023 04:00), Max: 98.8 (19 Aug 2023 08:00)  HR: 68 (20 Aug 2023 06:00) (58 - 87)  BP: --  BP(mean): --  RR: 15 (20 Aug 2023 06:00) (12 - 28)  SpO2: 100% (20 Aug 2023 06:00) (93% - 100%)    General: NAD, intubated   HEENT: NC/AT; PERRL, clear conjunctiva  Respiratory: CTA b/l  Cardiovascular: +S1/S2; RRR  Abdomen: soft, NT/ND; +BS x4  Extremities: WWP, 2+ peripheral pulses b/l; +edema in dependent regions   Skin: improved rash     MEDICATIONS:  MEDICATIONS  (STANDING):  albuterol/ipratropium for Nebulization 3 milliLiter(s) Nebulizer every 6 hours  atorvastatin 40 milliGRAM(s) Oral at bedtime  chlorhexidine 0.12% Liquid 15 milliLiter(s) Oral Mucosa every 12 hours  chlorhexidine 2% Cloths 1 Application(s) Topical daily  dexMEDEtomidine Infusion 0.1 MICROgram(s)/kG/Hr (1.66 mL/Hr) IV Continuous <Continuous>  dextrose 5%. 1000 milliLiter(s) (50 mL/Hr) IV Continuous <Continuous>  dextrose 5%. 1000 milliLiter(s) (100 mL/Hr) IV Continuous <Continuous>  dextrose 50% Injectable 12.5 Gram(s) IV Push once  dextrose 50% Injectable 25 Gram(s) IV Push once  dextrose 50% Injectable 25 Gram(s) IV Push once  glucagon  Injectable 1 milliGRAM(s) IntraMuscular once  heparin   Injectable 5000 Unit(s) SubCutaneous every 8 hours  insulin lispro (ADMELOG) corrective regimen sliding scale   SubCutaneous every 6 hours  insulin NPH human recombinant 12 Unit(s) SubCutaneous every 6 hours  labetalol 300 milliGRAM(s) Oral three times a day  nitroglycerin  Infusion 20 MICROgram(s)/Min (6 mL/Hr) IV Continuous <Continuous>  predniSONE   Tablet 40 milliGRAM(s) Oral daily  sodium chloride 3%  Inhalation 4 milliLiter(s) Inhalation every 6 hours    MEDICATIONS  (PRN):  acetaminophen   Oral Liquid .. 650 milliGRAM(s) Oral every 6 hours PRN Temp greater or equal to 38C (100.4F), Mild Pain (1 - 3)  dextrose Oral Gel 15 Gram(s) Oral once PRN Blood Glucose LESS THAN 70 milliGRAM(s)/deciliter      ALLERGIES:  Allergies    isoniazid (Rash)  nafcillin (Unknown)  hydrALAZINE (Rash)  vitamin E (Short breath; Urticaria; Hives)  doxycycline (Rash)  cefepime (Rash)  NIFEdipine (Urticaria; Hives)    Intolerances        LABS:                        7.4    16.97 )-----------( 302      ( 20 Aug 2023 00:41 )             23.0     08-20    139  |  101  |  47<H>  ----------------------------<  213<H>  3.1<L>   |  28  |  2.42<H>    Ca    9.2      20 Aug 2023 00:41  Phos  2.8     08-20  Mg     2.50     08-20    TPro  6.5  /  Alb  2.7<L>  /  TBili  0.2  /  DBili  x   /  AST  26  /  ALT  5   /  AlkPhos  226<H>  08-20          Urinalysis Basic - ( 20 Aug 2023 00:41 )    Color: x / Appearance: x / SG: x / pH: x  Gluc: 213 mg/dL / Ketone: x  / Bili: x / Urobili: x   Blood: x / Protein: x / Nitrite: x   Leuk Esterase: x / RBC: x / WBC x   Sq Epi: x / Non Sq Epi: x / Bacteria: x          IMAGING & OTHER TESTS:    NNI   ************************  Jimmy Gleason, MD-PhD  Internal Medicine PGY-2  ************************    SUBJECTIVE:      Patient seen and examined at bedside. No events overnight. Patient NPO with tube feeds. Oliguric to anuric UOP. Unable to assess ROS. Patient reminded of ongoing careplan.      VITAL SIGNS:  ICU Vital Signs Last 24 Hrs  T(C): 36.6 (20 Aug 2023 04:00), Max: 37.1 (19 Aug 2023 08:00)  T(F): 97.9 (20 Aug 2023 04:00), Max: 98.8 (19 Aug 2023 08:00)  HR: 68 (20 Aug 2023 06:00) (58 - 87)  BP: --  BP(mean): --  ABP: 157/43 (20 Aug 2023 06:00) (124/35 - 202/54)  ABP(mean): 84 (20 Aug 2023 06:00) (65 - 336)  RR: 15 (20 Aug 2023 06:00) (12 - 28)  SpO2: 100% (20 Aug 2023 06:00) (93% - 100%)    O2 Parameters below as of 20 Aug 2023 06:00  Patient On (Oxygen Delivery Method): ventilator    O2 Concentration (%): 30      Mode: AC/ CMV (Assist Control/ Continuous Mandatory Ventilation), RR (machine): 12, TV (machine): 360, FiO2: 30, PEEP: 5, ITime: 0.73, MAP: 8, PIP: 29  Plateau pressure:   P/F ratio:     08-19 @ 07:01  -  08-20 @ 07:00  --------------------------------------------------------  IN: 2111.2 mL / OUT: 900 mL / NET: 1211.2 mL      CAPILLARY BLOOD GLUCOSE      POCT Blood Glucose.: 191 mg/dL (20 Aug 2023 05:36)    ECG:     PHYSICAL EXAM:    08-19 @ 07:01  -  08-20 @ 07:00  --------------------------------------------------------  IN: 2111.2 mL / OUT: 900 mL / NET: 1211.2 mL    Vital Signs Last 24 Hrs  T(C): 36.6 (20 Aug 2023 04:00), Max: 37.1 (19 Aug 2023 08:00)  T(F): 97.9 (20 Aug 2023 04:00), Max: 98.8 (19 Aug 2023 08:00)  HR: 68 (20 Aug 2023 06:00) (58 - 87)  BP: --  BP(mean): --  RR: 15 (20 Aug 2023 06:00) (12 - 28)  SpO2: 100% (20 Aug 2023 06:00) (93% - 100%)    General: NAD, intubated   HEENT: NC/AT; PERRL, clear conjunctiva  Respiratory: CTA b/l  Cardiovascular: +S1/S2; RRR  Abdomen: soft, NT/ND; +BS x4  Extremities: WWP, 2+ peripheral pulses b/l; bi/l LE trace edema  Skin: improved rash     MEDICATIONS:  MEDICATIONS  (STANDING):  albuterol/ipratropium for Nebulization 3 milliLiter(s) Nebulizer every 6 hours  atorvastatin 40 milliGRAM(s) Oral at bedtime  chlorhexidine 0.12% Liquid 15 milliLiter(s) Oral Mucosa every 12 hours  chlorhexidine 2% Cloths 1 Application(s) Topical daily  dexMEDEtomidine Infusion 0.1 MICROgram(s)/kG/Hr (1.66 mL/Hr) IV Continuous <Continuous>  dextrose 5%. 1000 milliLiter(s) (50 mL/Hr) IV Continuous <Continuous>  dextrose 5%. 1000 milliLiter(s) (100 mL/Hr) IV Continuous <Continuous>  dextrose 50% Injectable 12.5 Gram(s) IV Push once  dextrose 50% Injectable 25 Gram(s) IV Push once  dextrose 50% Injectable 25 Gram(s) IV Push once  glucagon  Injectable 1 milliGRAM(s) IntraMuscular once  heparin   Injectable 5000 Unit(s) SubCutaneous every 8 hours  insulin lispro (ADMELOG) corrective regimen sliding scale   SubCutaneous every 6 hours  insulin NPH human recombinant 12 Unit(s) SubCutaneous every 6 hours  labetalol 300 milliGRAM(s) Oral three times a day  nitroglycerin  Infusion 20 MICROgram(s)/Min (6 mL/Hr) IV Continuous <Continuous>  predniSONE   Tablet 40 milliGRAM(s) Oral daily  sodium chloride 3%  Inhalation 4 milliLiter(s) Inhalation every 6 hours    MEDICATIONS  (PRN):  acetaminophen   Oral Liquid .. 650 milliGRAM(s) Oral every 6 hours PRN Temp greater or equal to 38C (100.4F), Mild Pain (1 - 3)  dextrose Oral Gel 15 Gram(s) Oral once PRN Blood Glucose LESS THAN 70 milliGRAM(s)/deciliter      ALLERGIES:  Allergies    isoniazid (Rash)  nafcillin (Unknown)  hydrALAZINE (Rash)  vitamin E (Short breath; Urticaria; Hives)  doxycycline (Rash)  cefepime (Rash)  NIFEdipine (Urticaria; Hives)    Intolerances        LABS:                        7.4    16.97 )-----------( 302      ( 20 Aug 2023 00:41 )             23.0     08-20    139  |  101  |  47<H>  ----------------------------<  213<H>  3.1<L>   |  28  |  2.42<H>    Ca    9.2      20 Aug 2023 00:41  Phos  2.8     08-20  Mg     2.50     08-20    TPro  6.5  /  Alb  2.7<L>  /  TBili  0.2  /  DBili  x   /  AST  26  /  ALT  5   /  AlkPhos  226<H>  08-20          Urinalysis Basic - ( 20 Aug 2023 00:41 )    Color: x / Appearance: x / SG: x / pH: x  Gluc: 213 mg/dL / Ketone: x  / Bili: x / Urobili: x   Blood: x / Protein: x / Nitrite: x   Leuk Esterase: x / RBC: x / WBC x   Sq Epi: x / Non Sq Epi: x / Bacteria: x          IMAGING & OTHER TESTS:    NNI

## 2023-08-20 NOTE — PROGRESS NOTE ADULT - ATTENDING COMMENTS
73F with acute hypoxemic and hypercapnic respiratory failure with acute renal failure.  Had 1 session of HD yesterday.  Improved uremia.  SAT/SBT as tolerated.  Increase labetalol for BP control.

## 2023-08-20 NOTE — PROGRESS NOTE ADULT - ASSESSMENT
72 y/o F DM on insulin, HTN, CKD, CHFpEF, breast CA, respiratory arrest and cardiac arrest (2018), CVA with residual weakness, aspiration PNA h/o trache, PEG, both removed presents with respiratory distress requiring intubation. May be volume overload i/s/o CHF/CKD vs decrease respiratory drive (given oxygen at home). Improving mental status however worsening renal function and anuria, concerning for ATN vs AIN.     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 No Hx Afib or documented   STEPHANIE with no TRINITY thrombus or intracardiac shunts   - avoid hypertension with BP < 180 systolic   - holding ASA for possible kidney biopsy planned for 8/22   - c/w statin     Cardiac   #Shock  #Hypertension   - Initially vasoplegic from prop  - weaned vaso and levo, now hypertensive likely from fluid overload with renal dysfunction   - changed coreg to labetalol 200 TID for BP control given CVA   - on nitro drip     #CHFpEF   TTE 7/2023 with EF 59%, with severe LVH and diastolic dysfunction   pro-BNP > 55029, trop 78   Repeat TTE with EF 60% normal systolic and grade 1 diastolic dysfunction     Pulmonary   #Acute hypercapnic and hypoxemic respiratory failure   DDx: aspiration vs volume overload vs sedating medication decreasing drive (was given nyquil)   VBG with respiratory acidosis pCO2 111  CXR with small right pleural effusion, no consolidations/opacities  - c/w mechanical ventilation   - monitor blood gas while on vent      /Renal   #RUSS on CKD   Ddx: obstructive (willard in, no hydro on POCUS) vs low flow state vs AIN i/s/o cephalosporin use and drug rash   Urinalysis with 7 WBC, would not pursue steroids at this time per nephro  Worsening creatinine still but no urgent indication for HD, ATN   - kidney biopsy to r/o AIN planned 8/22   - started empiric prednisone 40mg 8/18   - trialed diuretics without improvement   - Continue to monitor, dose meds per eGFR. avoid nephrotoxic agents  - considering HD today, although not urgent     #Hyperkalemia with EKG changes    7.2, hemolyzed, given insulin and D50 + calcium gluc   Likely i/s/o renal dysfunction   Received lasix 80 and 40 IV initially but without adequate response   Given bumex 2mg the morning of 8/12  Per nephro, started on bumex gtt with worsening creatinine   - decreasing urine output, now oliguric   - off bumex gtt       GI  Diet: NPO with tube feeds     Endocrine  #T2DM   A1C 7.1 in 7/2023   - c/w NPH to 8U q6 with mod SS   - BG goal 140-200     ID   No fever/leukocytosis, recheck temp   Hx latent TB which was treated, no concern for TB   Started on empiric vancomycin and aztreonam (cefepime/zosyn allergy? developed rash req prednisone)   Trialed cefepime --> cefazolin (sputum MSSA), developed drug eruption - discontinued   Blood and urine cultures 8/11 demonstrating no growth currently  Rising leukocytosis with low grade temp  - start empiric antibx if febrile > 101  - BC NGTD  - willard removed     Heme/Onc  ppx: heparin q8 hrs     Ethics  GOC - Per previous GOC with daughter and , reported that they have not talked about end of life care with the patient. For now, they wanted "everything to be done" including CPR, continuing with mech ventilation/intubation, pressors   74 y/o F DM on insulin, HTN, CKD, CHFpEF, breast CA, respiratory arrest and cardiac arrest (2018), CVA with residual weakness, aspiration PNA h/o trache, PEG, both removed presents with respiratory distress requiring intubation. May be volume overload i/s/o CHF/CKD vs decrease respiratory drive (given oxygen at home). Improving mental status however worsening renal function and anuria, concerning for ATN vs AIN.     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 No Hx Afib or documented   STEPHANIE with no TRINITY thrombus or intracardiac shunts   - avoid hypertension with BP < 180 systolic   - holding ASA for possible kidney biopsy planned for 8/22   - c/w statin     Cardiac   #Shock  #Hypertension   - Initially vasoplegic from prop  - weaned vaso and levo, now hypertensive likely from fluid overload with renal dysfunction   > changed coreg to labetalol and uptitrated to 400 TID for BP control given CVA   > on nitro drip     #CHFpEF   TTE 7/2023 with EF 59%, with severe LVH and diastolic dysfunction   pro-BNP > 11824, trop 78   Repeat TTE with EF 60% normal systolic and grade 1 diastolic dysfunction     Pulmonary   #Acute hypercapnic and hypoxemic respiratory failure   DDx: aspiration vs volume overload vs sedating medication decreasing drive (was given nyquil)   VBG with respiratory acidosis pCO2 111  CXR with small right pleural effusion, no consolidations/opacities  > c/w mechanical ventilation   > PS trials, plan for possible extubation 8/21  > monitor blood gas while on vent      /Renal   #RUSS on CKD  Ddx: obstructive (willard in, no hydro on POCUS) vs low flow state vs AIN i/s/o cephalosporin use and drug rash   Urinalysis with 7 WBC, would not pursue steroids at this time per nephro  Worsening creatinine still but no urgent indication for HD, ATN   - kidney biopsy to r/o AIN planned 8/22   - cont empiric prednisone 40mg started 8/18   - trialed diuretics without improvement   - Continue to monitor, dose meds per eGFR. avoid nephrotoxic agents  - first HD session 8/19, will plan for additional fluid removal or HD sessions with nephro before extubation    #Hyperkalemia with EKG changes    7.2, hemolyzed, given insulin and D50 + calcium gluc   Likely i/s/o renal dysfunction   Received lasix 80 and 40 IV initially but without adequate response   Given bumex 2mg the morning of 8/12  Per nephro, started on bumex gtt with worsening creatinine   - decreasing urine output, now oliguric   - off bumex gtt       GI  Diet: NPO with tube feeds     Endocrine  #T2DM   A1C 7.1 in 7/2023   - c/w NPH to 8U q6 with mod SS   - BG goal 140-200     ID   No fever/leukocytosis, recheck temp   Hx latent TB which was treated, no concern for TB   Started on empiric vancomycin and aztreonam (cefepime/zosyn allergy? developed rash req prednisone)   Trialed cefepime --> cefazolin (sputum MSSA), developed drug eruption - discontinued   Blood and urine cultures 8/11 demonstrating no growth currently  Rising leukocytosis with low grade temp  - start empiric antibx if febrile > 101  - BC NGTD  - willard removed     Heme/Onc  ppx: heparin q8 hrs     Ethics  GOC - Per previous GOC with daughter and , reported that they have not talked about end of life care with the patient. For now, they wanted "everything to be done" including CPR, continuing with mech ventilation/intubation, pressors

## 2023-08-20 NOTE — PROGRESS NOTE ADULT - SUBJECTIVE AND OBJECTIVE BOX
Patient is a 73y old  Female who presents with a chief complaint of Respiratory distress (20 Aug 2023 17:49)      SUBJECTIVE / OVERNIGHT EVENTS: s/p HD 8/19, next HD 8/21, remains intubated, on precedex,     MEDICATIONS  (STANDING):  albuterol/ipratropium for Nebulization 3 milliLiter(s) Nebulizer every 8 hours  atorvastatin 40 milliGRAM(s) Oral at bedtime  chlorhexidine 0.12% Liquid 15 milliLiter(s) Oral Mucosa every 12 hours  chlorhexidine 2% Cloths 1 Application(s) Topical daily  dexMEDEtomidine Infusion 0.1 MICROgram(s)/kG/Hr (1.66 mL/Hr) IV Continuous <Continuous>  dextrose 5%. 1000 milliLiter(s) (100 mL/Hr) IV Continuous <Continuous>  dextrose 5%. 1000 milliLiter(s) (50 mL/Hr) IV Continuous <Continuous>  dextrose 50% Injectable 25 Gram(s) IV Push once  dextrose 50% Injectable 25 Gram(s) IV Push once  dextrose 50% Injectable 12.5 Gram(s) IV Push once  glucagon  Injectable 1 milliGRAM(s) IntraMuscular once  heparin   Injectable 5000 Unit(s) SubCutaneous every 8 hours  insulin lispro (ADMELOG) corrective regimen sliding scale   SubCutaneous every 6 hours  insulin NPH human recombinant 12 Unit(s) SubCutaneous every 6 hours  labetalol 400 milliGRAM(s) Oral three times a day  nitroglycerin  Infusion 20 MICROgram(s)/Min (6 mL/Hr) IV Continuous <Continuous>  predniSONE   Tablet 40 milliGRAM(s) Oral daily  sodium chloride 3%  Inhalation 4 milliLiter(s) Inhalation every 6 hours    MEDICATIONS  (PRN):  acetaminophen   Oral Liquid .. 650 milliGRAM(s) Oral every 6 hours PRN Temp greater or equal to 38C (100.4F), Mild Pain (1 - 3)  dextrose Oral Gel 15 Gram(s) Oral once PRN Blood Glucose LESS THAN 70 milliGRAM(s)/deciliter      Vital Signs Last 24 Hrs  T(F): 97.6 (08-20-23 @ 16:00), Max: 98.7 (08-20-23 @ 08:00)  HR: 78 (08-20-23 @ 18:00) (58 - 78)  BP: --  RR: 16 (08-20-23 @ 18:00) (12 - 24)  SpO2: 99% (08-20-23 @ 18:00) (98% - 100%)  Telemetry:   CAPILLARY BLOOD GLUCOSE      POCT Blood Glucose.: 245 mg/dL (20 Aug 2023 18:04)  POCT Blood Glucose.: 208 mg/dL (20 Aug 2023 11:11)  POCT Blood Glucose.: 191 mg/dL (20 Aug 2023 05:36)  POCT Blood Glucose.: 211 mg/dL (20 Aug 2023 00:39)    I&O's Summary    19 Aug 2023 07:01  -  20 Aug 2023 07:00  --------------------------------------------------------  IN: 2111.2 mL / OUT: 900 mL / NET: 1211.2 mL    20 Aug 2023 07:01  -  20 Aug 2023 19:10  --------------------------------------------------------  IN: 1248.6 mL / OUT: 0 mL / NET: 1248.6 mL        PHYSICAL EXAM:  GENERAL: NAD, well-developed  HEAD:  Atraumatic, Normocephalic  EYES: EOMI, PERRLA, conjunctiva and sclera clear  NECK: Supple, No JVD  CHEST/LUNG: Clear to auscultation bilaterally; No wheeze  HEART: Regular rate and rhythm; No murmurs, rubs, or gallops  ABDOMEN: Soft, Nontender, Nondistended; Bowel sounds present  EXTREMITIES:  2+ Peripheral Pulses, No clubbing, cyanosis, or edema  PSYCH: AAOx3  NEUROLOGY: non-focal  SKIN: No rashes or lesions    LABS:                        7.4    16.97 )-----------( 302      ( 20 Aug 2023 00:41 )             23.0     08-20    139  |  101  |  47<H>  ----------------------------<  213<H>  3.1<L>   |  28  |  2.42<H>    Ca    9.2      20 Aug 2023 00:41  Phos  2.8     08-20  Mg     2.50     08-20    TPro  6.5  /  Alb  2.7<L>  /  TBili  0.2  /  DBili  x   /  AST  26  /  ALT  5   /  AlkPhos  226<H>  08-20          Urinalysis Basic - ( 20 Aug 2023 00:41 )    Color: x / Appearance: x / SG: x / pH: x  Gluc: 213 mg/dL / Ketone: x  / Bili: x / Urobili: x   Blood: x / Protein: x / Nitrite: x   Leuk Esterase: x / RBC: x / WBC x   Sq Epi: x / Non Sq Epi: x / Bacteria: x        RADIOLOGY & ADDITIONAL TESTS:    Imaging Personally Reviewed:    Consultant(s) Notes Reviewed:      Care Discussed with Consultants/Other Providers:

## 2023-08-21 NOTE — PROGRESS NOTE ADULT - ATTENDING COMMENTS
73 F with DM, CKD, HFpEF, h/o CVA and trach and PEG (now removed) here with acute hypoxemic and hypercapnic respiratory failure requiring intubation in setting of RUSS and acute pulmonary edema, possible ATN vs AIN    now with episodes of very high blood pressure not responsive to sedation    # acute hypoxemic and hypercapnic respiratory failure  # RUSS/ATN/AIN  # acute pulmonary edema  # essential hypertension  - c/w full vent support for now  - wean sedation as tolerated  - increase labetalol, wean cardene as tolerated  - HD for fluid removal and to help with acute metabolic encephalopathy   - plan for renal biopsy in AM    Critically ill patient requiring frequent bedside visits with therapy changes.

## 2023-08-21 NOTE — PROGRESS NOTE ADULT - SUBJECTIVE AND OBJECTIVE BOX
*******************************  Martha Douglas PGY-2  *******************************    INTERVAL HPI/OVERNIGHT EVENTS:    SUBJECTIVE: Patient seen and examined at bedside.     OBJECTIVE:    VITAL SIGNS:  ICU Vital Signs Last 24 Hrs  T(C): 37.1 (21 Aug 2023 04:00), Max: 37.1 (20 Aug 2023 08:00)  T(F): 98.8 (21 Aug 2023 04:00), Max: 98.8 (21 Aug 2023 04:00)  HR: 64 (21 Aug 2023 05:00) (61 - 85)  BP: --  BP(mean): --  ABP: 221/65 (21 Aug 2023 05:00) (126/39 - 221/65)  ABP(mean): 123 (21 Aug 2023 05:00) (69 - 129)  RR: 14 (21 Aug 2023 05:00) (12 - 24)  SpO2: 100% (21 Aug 2023 05:00) (98% - 100%)    O2 Parameters below as of 21 Aug 2023 05:00  Patient On (Oxygen Delivery Method): ventilator          Mode: AC/ CMV (Assist Control/ Continuous Mandatory Ventilation), RR (machine): 12, TV (machine): 360, FiO2: 30, PEEP: 5, ITime: 0.8, MAP: 9, PIP: 36    08-19 @ 07:01 - 08-20 @ 07:00  --------------------------------------------------------  IN: 2111.2 mL / OUT: 900 mL / NET: 1211.2 mL    08-20 @ 07:01 - 08-21 @ 06:50  --------------------------------------------------------  IN: 2702 mL / OUT: 0 mL / NET: 2702 mL      CAPILLARY BLOOD GLUCOSE      POCT Blood Glucose.: 198 mg/dL (21 Aug 2023 05:53)        PHYSICAL EXAM:  GENERAL: NAD, well-developed  HEAD:  Atraumatic, Normocephalic  EYES: EOMI, PERRLA, conjunctiva and sclera clear  NECK: Supple, No JVD  CHEST/LUNG: Clear to auscultation bilaterally; No wheeze  HEART: Regular rate and rhythm; No murmurs, rubs, or gallops  ABDOMEN: Soft, Nontender, Nondistended; Bowel sounds present  EXTREMITIES:  2+ Peripheral Pulses, No clubbing, cyanosis, or edema  PSYCH: AAOx3  NEUROLOGY: non-focal  SKIN: No rashes or lesions  Lines:      MEDICATIONS:  MEDICATIONS  (STANDING):  albuterol/ipratropium for Nebulization 3 milliLiter(s) Nebulizer every 8 hours  atorvastatin 40 milliGRAM(s) Oral at bedtime  chlorhexidine 0.12% Liquid 15 milliLiter(s) Oral Mucosa every 12 hours  chlorhexidine 2% Cloths 1 Application(s) Topical daily  dexMEDEtomidine Infusion 0.1 MICROgram(s)/kG/Hr (1.66 mL/Hr) IV Continuous <Continuous>  dextrose 5%. 1000 milliLiter(s) (50 mL/Hr) IV Continuous <Continuous>  dextrose 5%. 1000 milliLiter(s) (100 mL/Hr) IV Continuous <Continuous>  dextrose 50% Injectable 12.5 Gram(s) IV Push once  dextrose 50% Injectable 25 Gram(s) IV Push once  dextrose 50% Injectable 25 Gram(s) IV Push once  glucagon  Injectable 1 milliGRAM(s) IntraMuscular once  heparin   Injectable 5000 Unit(s) SubCutaneous every 8 hours  insulin lispro (ADMELOG) corrective regimen sliding scale   SubCutaneous every 6 hours  insulin NPH human recombinant 14 Unit(s) SubCutaneous every 6 hours  labetalol 400 milliGRAM(s) Oral three times a day  niCARdipine Infusion 5 mG/Hr (25 mL/Hr) IV Continuous <Continuous>  predniSONE   Tablet 40 milliGRAM(s) Oral daily  sodium chloride 3%  Inhalation 4 milliLiter(s) Inhalation every 6 hours    MEDICATIONS  (PRN):  acetaminophen   Oral Liquid .. 650 milliGRAM(s) Oral every 6 hours PRN Temp greater or equal to 38C (100.4F), Mild Pain (1 - 3)  dextrose Oral Gel 15 Gram(s) Oral once PRN Blood Glucose LESS THAN 70 milliGRAM(s)/deciliter      ALLERGIES:  Allergies    isoniazid (Rash)  nafcillin (Unknown)  hydrALAZINE (Rash)  vitamin E (Short breath; Urticaria; Hives)  doxycycline (Rash)  cefepime (Rash)  NIFEdipine (Urticaria; Hives)    Intolerances        LABS:                        6.6    14.78 )-----------( 287      ( 21 Aug 2023 00:30 )             21.2     08-21    131<L>  |  97<L>  |  63<H>  ----------------------------<  340<H>  4.4   |  27  |  2.82<H>    Ca    9.1      21 Aug 2023 00:30  Phos  3.9     08-21  Mg     2.50     08-21    TPro  5.9<L>  /  Alb  2.6<L>  /  TBili  0.2  /  DBili  x   /  AST  70<H>  /  ALT  31  /  AlkPhos  203<H>  08-21      Urinalysis Basic - ( 21 Aug 2023 00:30 )    Color: x / Appearance: x / SG: x / pH: x  Gluc: 340 mg/dL / Ketone: x  / Bili: x / Urobili: x   Blood: x / Protein: x / Nitrite: x   Leuk Esterase: x / RBC: x / WBC x   Sq Epi: x / Non Sq Epi: x / Bacteria: x        RADIOLOGY & ADDITIONAL TESTS: Reviewed.     *******************************  Martha Douglas PGY-2  *******************************    INTERVAL HPI/OVERNIGHT EVENTS: Hypertensive 160s, started nicardipine gtt. s/p propofol gtt. Hgb was stable at 7, decreased to 6.7, s/p 1U RBC.     SUBJECTIVE: Patient seen and examined at bedside.     OBJECTIVE:    VITAL SIGNS:  ICU Vital Signs Last 24 Hrs  T(C): 37.1 (21 Aug 2023 04:00), Max: 37.1 (20 Aug 2023 08:00)  T(F): 98.8 (21 Aug 2023 04:00), Max: 98.8 (21 Aug 2023 04:00)  HR: 64 (21 Aug 2023 05:00) (61 - 85)  BP: --  BP(mean): --  ABP: 221/65 (21 Aug 2023 05:00) (126/39 - 221/65)  ABP(mean): 123 (21 Aug 2023 05:00) (69 - 129)  RR: 14 (21 Aug 2023 05:00) (12 - 24)  SpO2: 100% (21 Aug 2023 05:00) (98% - 100%)    O2 Parameters below as of 21 Aug 2023 05:00  Patient On (Oxygen Delivery Method): ventilator          Mode: AC/ CMV (Assist Control/ Continuous Mandatory Ventilation), RR (machine): 12, TV (machine): 360, FiO2: 30, PEEP: 5, ITime: 0.8, MAP: 9, PIP: 36    08-19 @ 07:01  -  08-20 @ 07:00  --------------------------------------------------------  IN: 2111.2 mL / OUT: 900 mL / NET: 1211.2 mL    08-20 @ 07:01  -  08-21 @ 06:50  --------------------------------------------------------  IN: 2702 mL / OUT: 0 mL / NET: 2702 mL      CAPILLARY BLOOD GLUCOSE      POCT Blood Glucose.: 198 mg/dL (21 Aug 2023 05:53)        PHYSICAL EXAM:  GENERAL: NAD, well-developed  HEAD:  Atraumatic, Normocephalic  EYES: EOMI, PERRLA, conjunctiva and sclera clear  NECK: Supple, No JVD  CHEST/LUNG: Clear to auscultation bilaterally; No wheeze  HEART: Regular rate and rhythm; No murmurs, rubs, or gallops  ABDOMEN: Soft, Nontender, Nondistended; Bowel sounds present  EXTREMITIES:  2+ Peripheral Pulses, No clubbing, cyanosis, or edema  PSYCH: AAOx3  NEUROLOGY: non-focal  SKIN: No rashes or lesions  Lines:      MEDICATIONS:  MEDICATIONS  (STANDING):  albuterol/ipratropium for Nebulization 3 milliLiter(s) Nebulizer every 8 hours  atorvastatin 40 milliGRAM(s) Oral at bedtime  chlorhexidine 0.12% Liquid 15 milliLiter(s) Oral Mucosa every 12 hours  chlorhexidine 2% Cloths 1 Application(s) Topical daily  dexMEDEtomidine Infusion 0.1 MICROgram(s)/kG/Hr (1.66 mL/Hr) IV Continuous <Continuous>  dextrose 5%. 1000 milliLiter(s) (50 mL/Hr) IV Continuous <Continuous>  dextrose 5%. 1000 milliLiter(s) (100 mL/Hr) IV Continuous <Continuous>  dextrose 50% Injectable 12.5 Gram(s) IV Push once  dextrose 50% Injectable 25 Gram(s) IV Push once  dextrose 50% Injectable 25 Gram(s) IV Push once  glucagon  Injectable 1 milliGRAM(s) IntraMuscular once  heparin   Injectable 5000 Unit(s) SubCutaneous every 8 hours  insulin lispro (ADMELOG) corrective regimen sliding scale   SubCutaneous every 6 hours  insulin NPH human recombinant 14 Unit(s) SubCutaneous every 6 hours  labetalol 400 milliGRAM(s) Oral three times a day  niCARdipine Infusion 5 mG/Hr (25 mL/Hr) IV Continuous <Continuous>  predniSONE   Tablet 40 milliGRAM(s) Oral daily  sodium chloride 3%  Inhalation 4 milliLiter(s) Inhalation every 6 hours    MEDICATIONS  (PRN):  acetaminophen   Oral Liquid .. 650 milliGRAM(s) Oral every 6 hours PRN Temp greater or equal to 38C (100.4F), Mild Pain (1 - 3)  dextrose Oral Gel 15 Gram(s) Oral once PRN Blood Glucose LESS THAN 70 milliGRAM(s)/deciliter      ALLERGIES:  Allergies    isoniazid (Rash)  nafcillin (Unknown)  hydrALAZINE (Rash)  vitamin E (Short breath; Urticaria; Hives)  doxycycline (Rash)  cefepime (Rash)  NIFEdipine (Urticaria; Hives)    Intolerances        LABS:                        6.6    14.78 )-----------( 287      ( 21 Aug 2023 00:30 )             21.2     08-21    131<L>  |  97<L>  |  63<H>  ----------------------------<  340<H>  4.4   |  27  |  2.82<H>    Ca    9.1      21 Aug 2023 00:30  Phos  3.9     08-21  Mg     2.50     08-21    TPro  5.9<L>  /  Alb  2.6<L>  /  TBili  0.2  /  DBili  x   /  AST  70<H>  /  ALT  31  /  AlkPhos  203<H>  08-21      Urinalysis Basic - ( 21 Aug 2023 00:30 )    Color: x / Appearance: x / SG: x / pH: x  Gluc: 340 mg/dL / Ketone: x  / Bili: x / Urobili: x   Blood: x / Protein: x / Nitrite: x   Leuk Esterase: x / RBC: x / WBC x   Sq Epi: x / Non Sq Epi: x / Bacteria: x        RADIOLOGY & ADDITIONAL TESTS: Reviewed.

## 2023-08-21 NOTE — PROGRESS NOTE ADULT - SUBJECTIVE AND OBJECTIVE BOX
Subjective: Patient seen and examined. No new events except as noted.   remains intubated in ICU   On Precedex and Nicardipine gtts      REVIEW OF SYSTEMS:  Unable to obtain     MEDICATIONS:  MEDICATIONS  (STANDING):  albuterol/ipratropium for Nebulization 3 milliLiter(s) Nebulizer every 8 hours  atorvastatin 40 milliGRAM(s) Oral at bedtime  chlorhexidine 0.12% Liquid 15 milliLiter(s) Oral Mucosa every 12 hours  chlorhexidine 2% Cloths 1 Application(s) Topical daily  dexMEDEtomidine Infusion 0.1 MICROgram(s)/kG/Hr (1.66 mL/Hr) IV Continuous <Continuous>  dextrose 5%. 1000 milliLiter(s) (50 mL/Hr) IV Continuous <Continuous>  dextrose 5%. 1000 milliLiter(s) (100 mL/Hr) IV Continuous <Continuous>  dextrose 50% Injectable 12.5 Gram(s) IV Push once  dextrose 50% Injectable 25 Gram(s) IV Push once  dextrose 50% Injectable 25 Gram(s) IV Push once  glucagon  Injectable 1 milliGRAM(s) IntraMuscular once  heparin   Injectable 5000 Unit(s) SubCutaneous every 8 hours  insulin lispro (ADMELOG) corrective regimen sliding scale   SubCutaneous every 6 hours  insulin NPH human recombinant 14 Unit(s) SubCutaneous every 6 hours  labetalol 400 milliGRAM(s) Oral three times a day  niCARdipine Infusion 5 mG/Hr (25 mL/Hr) IV Continuous <Continuous>  predniSONE   Tablet 40 milliGRAM(s) Oral daily  sodium chloride 3%  Inhalation 4 milliLiter(s) Inhalation every 6 hours      PHYSICAL EXAM:  T(C): 36.6 (08-21-23 @ 08:00), Max: 37.1 (08-21-23 @ 04:00)  HR: 69 (08-21-23 @ 08:10) (59 - 85)  BP: --  RR: 20 (08-21-23 @ 08:00) (11 - 24)  SpO2: 100% (08-21-23 @ 08:10) (98% - 100%)  Wt(kg): --  I&O's Summary    20 Aug 2023 07:01  -  21 Aug 2023 07:00  --------------------------------------------------------  IN: 2816 mL / OUT: 0 mL / NET: 2816 mL    21 Aug 2023 07:01  -  21 Aug 2023 08:59  --------------------------------------------------------  IN: 89.5 mL / OUT: 0 mL / NET: 89.5 mL          Appearance: NAD, intubated, sedated	  HEENT: dry oral mucosa, [facility] AMBROCIO   Lymphatic: No lymphadenopathy  Cardiovascular: Normal S1 S2, No JVD, No murmurs, No edema  Respiratory: Decreased BS, intubated on ventilator	  Psychiatry: A & O x 0, sedated  Gastrointestinal: Soft, Non-tender, + BS, + OGT	  Skin: No rashes, No ecchymoses, No cyanosis	  Extremities: No strength/ROM 2/2 sedation, + BL LE edema  Vascular: Peripheral pulses palpable 2+ bilaterally  +willard        LABS:    CARDIAC MARKERS:  Blood Gas Profile - Arterial (08.21.23 @ 00:30)   pH, Arterial: 7.38  pCO2, Arterial: 48 mmHg  pO2, Arterial: 107 mmHg  HCO3, Arterial: 28 mmol/L  Base Excess, Arterial: 2.9 mmol/L  Oxygen Saturation, Arterial: 99.4 %  Total CO2, Arterial: 30 mmol/L  FIO2, Arterial: 30  Blood Gas Source Arterial: ArterialBlood Gas Arterial, Lactate: 0.6 mmol/L (08.21.23 @ 00:30)                               7.7    16.20 )-----------( 301      ( 21 Aug 2023 06:50 )             24.5     08-21    131<L>  |  97<L>  |  63<H>  ----------------------------<  340<H>  4.4   |  27  |  2.82<H>    Ca    9.1      21 Aug 2023 00:30  Phos  3.9     08-21  Mg     2.50     08-21    TPro  5.9<L>  /  Alb  2.6<L>  /  TBili  0.2  /  DBili  x   /  AST  70<H>  /  ALT  31  /  AlkPhos  203<H>  08-21    proBNP:   Lipid Profile:   HgA1c:   TSH:             TELEMETRY: 	    ECG:  	  RADIOLOGY:   DIAGNOSTIC TESTING:  [ ] Echocardiogram:  [ ]  Catheterization:  [ ] Stress Test:    OTHER:

## 2023-08-21 NOTE — PROGRESS NOTE ADULT - SUBJECTIVE AND OBJECTIVE BOX
Patient is a 73y old  Female who presents with a chief complaint of Respiratory distress (21 Aug 2023 08:59)      SUBJECTIVE / OVERNIGHT EVENTS: remains intubated, received second HD today,     MEDICATIONS  (STANDING):  albuterol/ipratropium for Nebulization 3 milliLiter(s) Nebulizer every 8 hours  atorvastatin 40 milliGRAM(s) Oral at bedtime  chlorhexidine 0.12% Liquid 15 milliLiter(s) Oral Mucosa every 12 hours  chlorhexidine 2% Cloths 1 Application(s) Topical daily  dexMEDEtomidine Infusion 0.1 MICROgram(s)/kG/Hr (1.66 mL/Hr) IV Continuous <Continuous>  dextrose 5%. 1000 milliLiter(s) (50 mL/Hr) IV Continuous <Continuous>  dextrose 5%. 1000 milliLiter(s) (100 mL/Hr) IV Continuous <Continuous>  dextrose 50% Injectable 12.5 Gram(s) IV Push once  dextrose 50% Injectable 25 Gram(s) IV Push once  dextrose 50% Injectable 25 Gram(s) IV Push once  glucagon  Injectable 1 milliGRAM(s) IntraMuscular once  heparin   Injectable 5000 Unit(s) SubCutaneous every 8 hours  insulin lispro (ADMELOG) corrective regimen sliding scale   SubCutaneous every 6 hours  insulin NPH human recombinant 16 Unit(s) SubCutaneous every 6 hours  labetalol 500 milliGRAM(s) Oral three times a day  niCARdipine Infusion 5 mG/Hr (25 mL/Hr) IV Continuous <Continuous>  predniSONE   Tablet 40 milliGRAM(s) Oral daily    MEDICATIONS  (PRN):  acetaminophen   Oral Liquid .. 650 milliGRAM(s) Oral every 6 hours PRN Temp greater or equal to 38C (100.4F), Mild Pain (1 - 3)  dextrose Oral Gel 15 Gram(s) Oral once PRN Blood Glucose LESS THAN 70 milliGRAM(s)/deciliter      Vital Signs Last 24 Hrs  T(F): 99.4 (08-21-23 @ 16:00), Max: 99.4 (08-21-23 @ 16:00)  HR: 72 (08-21-23 @ 20:00) (59 - 89)  BP: --  RR: 12 (08-21-23 @ 20:00) (11 - 23)  SpO2: 99% (08-21-23 @ 20:00) (96% - 100%)  Telemetry:   CAPILLARY BLOOD GLUCOSE      POCT Blood Glucose.: 324 mg/dL (21 Aug 2023 17:26)  POCT Blood Glucose.: 397 mg/dL (21 Aug 2023 11:48)  POCT Blood Glucose.: 198 mg/dL (21 Aug 2023 05:53)  POCT Blood Glucose.: 419 mg/dL (21 Aug 2023 05:50)  POCT Blood Glucose.: 293 mg/dL (20 Aug 2023 23:04)    I&O's Summary    20 Aug 2023 07:01  -  21 Aug 2023 07:00  --------------------------------------------------------  IN: 2841 mL / OUT: 0 mL / NET: 2841 mL    21 Aug 2023 07:01  -  21 Aug 2023 21:47  --------------------------------------------------------  IN: 1834 mL / OUT: 2700 mL / NET: -866 mL        PHYSICAL EXAM:  GENERAL: NAD, well-developed  HEAD:  Atraumatic, Normocephalic  EYES: EOMI, PERRLA, conjunctiva and sclera clear  NECK: Supple, No JVD  CHEST/LUNG: Clear to auscultation bilaterally; No wheeze  HEART: Regular rate and rhythm; No murmurs, rubs, or gallops  ABDOMEN: Soft, Nontender, Nondistended; Bowel sounds present  EXTREMITIES:  2+ Peripheral Pulses, No clubbing, cyanosis, or edema  PSYCH: AAOx3  NEUROLOGY: non-focal  SKIN: No rashes or lesions    LABS:                        8.1    18.57 )-----------( 310      ( 21 Aug 2023 16:52 )             26.1     08-21    136  |  99  |  38<H>  ----------------------------<  334<H>  3.7   |  27  |  1.68<H>    Ca    8.6      21 Aug 2023 16:52  Phos  3.9     08-21  Mg     2.20     08-21    TPro  5.9<L>  /  Alb  2.6<L>  /  TBili  0.2  /  DBili  x   /  AST  70<H>  /  ALT  31  /  AlkPhos  203<H>  08-21    PT/INR - ( 21 Aug 2023 16:52 )   PT: 11.4 sec;   INR: 1.02 ratio         PTT - ( 21 Aug 2023 16:52 )  PTT:33.0 sec      Urinalysis Basic - ( 21 Aug 2023 16:52 )    Color: x / Appearance: x / SG: x / pH: x  Gluc: 334 mg/dL / Ketone: x  / Bili: x / Urobili: x   Blood: x / Protein: x / Nitrite: x   Leuk Esterase: x / RBC: x / WBC x   Sq Epi: x / Non Sq Epi: x / Bacteria: x

## 2023-08-21 NOTE — PROGRESS NOTE ADULT - ASSESSMENT
74 y/o F DM on insulin, HTN, CKD, CHFpEF, breast CA, respiratory arrest and cardiac arrest (2018), CVA with residual weakness, aspiration PNA h/o trache, PEG, both removed presents with respiratory distress requiring intubation. May be volume overload i/s/o CHF/CKD vs decrease respiratory drive (given oxygen at home). Improving mental status however worsening renal function and anuria, concerning for ATN vs AIN.     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 No Hx Afib or documented   STEPHANIE with no TRINITY thrombus or intracardiac shunts   - avoid hypertension with BP < 180 systolic   - holding ASA for possible kidney biopsy planned for 8/22   - c/w statin     Cardiac   #Shock  #Hypertension   - Initially vasoplegic from prop  - weaned vaso and levo, now hypertensive likely from fluid overload with renal dysfunction   > changed coreg to labetalol and uptitrated to 400 TID for BP control given CVA   > on nitro drip     #CHFpEF   TTE 7/2023 with EF 59%, with severe LVH and diastolic dysfunction   pro-BNP > 87266, trop 78   Repeat TTE with EF 60% normal systolic and grade 1 diastolic dysfunction     Pulmonary   #Acute hypercapnic and hypoxemic respiratory failure   DDx: aspiration vs volume overload vs sedating medication decreasing drive (was given nyquil)   VBG with respiratory acidosis pCO2 111  CXR with small right pleural effusion, no consolidations/opacities  > c/w mechanical ventilation   > PS trials, plan for possible extubation 8/21  > monitor blood gas while on vent      /Renal   #RUSS on CKD  Ddx: obstructive (willard in, no hydro on POCUS) vs low flow state vs AIN i/s/o cephalosporin use and drug rash   Urinalysis with 7 WBC, would not pursue steroids at this time per nephro  Worsening creatinine still but no urgent indication for HD, ATN   - kidney biopsy to r/o AIN planned 8/22   - cont empiric prednisone 40mg started 8/18   - trialed diuretics without improvement   - Continue to monitor, dose meds per eGFR. avoid nephrotoxic agents  - first HD session 8/19, will plan for additional fluid removal or HD sessions with nephro before extubation    #Hyperkalemia with EKG changes    7.2, hemolyzed, given insulin and D50 + calcium gluc   Likely i/s/o renal dysfunction   Received lasix 80 and 40 IV initially but without adequate response   Given bumex 2mg the morning of 8/12  Per nephro, started on bumex gtt with worsening creatinine   - decreasing urine output, now oliguric   - off bumex gtt       GI  Diet: NPO with tube feeds     Endocrine  #T2DM   A1C 7.1 in 7/2023   - c/w NPH to 8U q6 with mod SS   - BG goal 140-200     ID   No fever/leukocytosis, recheck temp   Hx latent TB which was treated, no concern for TB   Started on empiric vancomycin and aztreonam (cefepime/zosyn allergy? developed rash req prednisone)   Trialed cefepime --> cefazolin (sputum MSSA), developed drug eruption - discontinued   Blood and urine cultures 8/11 demonstrating no growth currently  Rising leukocytosis with low grade temp  - start empiric antibx if febrile > 101  - BC NGTD  - willard removed     Heme/Onc  ppx: heparin q8 hrs     Ethics  GOC - Per previous GOC with daughter and , reported that they have not talked about end of life care with the patient. For now, they wanted "everything to be done" including CPR, continuing with mech ventilation/intubation, pressors   74 y/o F DM on insulin, HTN, CKD, CHFpEF, breast CA, respiratory arrest and cardiac arrest (2018), CVA with residual weakness, aspiration PNA h/o trache, PEG, both removed presents with respiratory distress requiring intubation. May be volume overload i/s/o CHF/CKD vs decrease respiratory drive (given oxygen at home). Improving mental status however worsening renal function and anuria, concerning for ATN vs AIN.     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 No Hx Afib or documented   STEPHANIE with no TRINITY thrombus or intracardiac shunts   - avoid hypertension with BP < 180 systolic   - holding ASA for possible kidney biopsy planned for 8/22   - c/w statin     Cardiac   #Hypertension  - To increase labetalol to 500, wean Precedex and Nicardipine    #Shock  #Hypertension   - Initially vasoplegic from prop  - weaned vaso and levo, now hypertensive likely from fluid overload with renal dysfunction   > changed coreg to labetalol and uptitrated to 400 TID for BP control given CVA   > on nitro drip     #CHFpEF   TTE 7/2023 with EF 59%, with severe LVH and diastolic dysfunction   pro-BNP > 52691, trop 78   Repeat TTE with EF 60% normal systolic and grade 1 diastolic dysfunction     Pulmonary   #Acute hypercapnic and hypoxemic respiratory failure   DDx: aspiration vs volume overload vs sedating medication decreasing drive (was given nyquil)   VBG with respiratory acidosis pCO2 111  CXR with small right pleural effusion, no consolidations/opacities  > c/w mechanical ventilation   > PS trials, plan for possible extubation 8/21  > monitor blood gas while on vent    - 8/21: d/c hypertonic saline    /Renal   #RUSS on CKD  Ddx: obstructive (willard in, no hydro on POCUS) vs low flow state vs AIN i/s/o cephalosporin use and drug rash   Urinalysis with 7 WBC, would not pursue steroids at this time per nephro  Worsening creatinine still but no urgent indication for HD, ATN   - kidney biopsy to r/o AIN planned 8/22   - cont empiric prednisone 40mg started 8/18   - trialed diuretics without improvement   - Continue to monitor, dose meds per eGFR. avoid nephrotoxic agents  - first HD session 8/19, will plan for additional fluid removal or HD sessions with nephro before extubation    #Hyperkalemia with EKG changes    7.2, hemolyzed, given insulin and D50 + calcium gluc   Likely i/s/o renal dysfunction   Received lasix 80 and 40 IV initially but without adequate response   Given bumex 2mg the morning of 8/12  Per nephro, started on bumex gtt with worsening creatinine   - decreasing urine output, now oliguric   - off bumex gtt       GI  Diet: NPO with tube feeds     Endocrine  #T2DM   A1C 7.1 in 7/2023   - c/w NPH to 14U q6 with mod SS  -- 8/21: If PM FS elevated, increase to 16U  - BG goal 140-200     ID   No fever/leukocytosis, recheck temp   Hx latent TB which was treated, no concern for TB   Started on empiric vancomycin and aztreonam (cefepime/zosyn allergy? developed rash req prednisone)   Trialed cefepime --> cefazolin (sputum MSSA), developed drug eruption - discontinued   Blood and urine cultures 8/11 demonstrating no growth currently  Rising leukocytosis with low grade temp  - start empiric antibx if febrile > 101  - BC NGTD  - willard removed     Heme/Onc  ppx: heparin q8 hrs   8/21: Coag Panel for IR/kidney bx 8/22    Ethics  GOC - Per previous GOC with daughter and , reported that they have not talked about end of life care with the patient. For now, they wanted "everything to be done" including CPR, continuing with mech ventilation/intubation, pressors

## 2023-08-21 NOTE — PROGRESS NOTE ADULT - ASSESSMENT
74 y/o F well known to me from my [facility] outhospitals practice. she was admitted at [facility] 7/12-7/22 w aspiration PNA, was treated w CEFEPIME, developed an allergic rash,  dCHF, + MAC on AFB culture, had been progressively getting more and more lethargic and dyspneic at home since DC.   In  am of 8/11/23  ptn presented with respiratory distress w hypoxia and hypercarbia requiring intubation 2/2 volume overload +/- Asp PNA      Neuro   off sedation  Baseline MS AOx3, aphasic   - h/o CVA , on aspirin and statin . resumed w feeding tube, now off ASA in preparation for renal Bx  - eeg  2/2 tremors, no sz focus  - starting to become more responsive   - MRI 8/17:  new R cerebellar infarct, old left PCA/Occipital infarct. probably embolic in nature, did not tolerate full AC in the past. pending STEPHANIE.       Cardiac   Ptn well known to Dr. Dunn, consult reviewed   CHFpEF   TTE 7/2023 with EF 59%, with severe LVH and diastolic dysfunction   pro-BNP > 34205, trop 78       Pulmonary   Acute hypercapnic and hypoxemic respiratory failure   well known to Dr. Mcnulty, consult reviewed   DDx: aspiration vs volume overload   VBG with respiratory acidosis pCO2 111  CT chest: mod ( worsening) R pl effusion, h/o RUL cavity, +MAC  - cefepime DCed on 8/13, started on Moxifloxacin on 8/13, since 8/14 off ABx    Renal   Hyperkalemia with EKG changes  , initially treated w LOKELMA,  now resolved   Ptn well know to Dr. Colon, consult reviewed      worsening renal function, almost anuric, fluid overloaded, BP elevated, on nitro drip, labetalol, on empiric steroids for AIN ,   started HD 8/19, 500 cc removed next HD 8/21,   renal biopsy 8/22    GI  NPO for now, on tube feeds  would start GI ppx w IV PPI    Endocrine  T2DM   A1C 7.1 in 7/2023   - BG goal 140-200     ID   No fever/leukocytosis, recheck temp   Hx latent TB which was treated, no concern for TB   - grew MAC on AFB culture from most recent admission. would call ID consult  Started on empiric vancomycin and aztreonam,  changed to cefepime 8/12, cefepime dced on 8/13(cefepime/zosyn allergy.  developed rash when on cefepime on previous admission ) . started on AVALOX on 8/13, off ABx on 8/14. ptn has an allergic rash, prob 2/2 cefepime  - f/u MRSA   - all cxs NTD    Heme/Onc  ppx: heparin q8 hrs     Ethics  GOC - Discussed GOC with daughter and , they have opted in the past for full code. and she remains full code at present

## 2023-08-22 NOTE — PROGRESS NOTE ADULT - ASSESSMENT
74 y/o F well known to me from my [facility]. she was admitted at [facility] 7/12-7/22 w aspiration PNA, was treated w CEFEPIME, developed an allergic rash,  dCHF, + MAC on AFB culture, had been progressively getting more and more lethargic and dyspneic at home since DC.   In  am of 8/11/23  ptn presented with respiratory distress w hypoxia and hypercarbia requiring intubation 2/2 volume overload +/- Asp PNA      Neuro   off sedation  Baseline MS AOx3, aphasic   - h/o CVA , on aspirin and statin . resumed w feeding tube, now off ASA in preparation for renal Bx  - eeg  2/2 tremors, no sz focus  - starting to become more responsive   - MRI 8/17:  new R cerebellar infarct, old left PCA/Occipital infarct. probably embolic in nature, did not tolerate full AC in the past. pending STEPHANIE.       Cardiac   Ptn well known to Dr. Dunn, consult reviewed   CHFpEF   TTE 7/2023 with EF 59%, with severe LVH and diastolic dysfunction   pro-BNP > 73666, trop 78       Pulmonary   Acute hypercapnic and hypoxemic respiratory failure   well known to Dr. Mcnulty, consult reviewed   DDx: aspiration vs volume overload   VBG with respiratory acidosis pCO2 111  CT chest: mod ( worsening) R pl effusion, h/o RUL cavity, +MAC  - cefepime DCed on 8/13, started on Moxifloxacin on 8/13, since 8/14 off ABx    Renal   Hyperkalemia with EKG changes  , initially treated w LOKELMA,  now resolved   Ptn well know to Dr. Colon, consult reviewed      worsening renal function, almost anuric, fluid overloaded, BP elevated, on nitro drip, labetalol, on empiric steroids for AIN ,   started HD 8/19, and 8/21, no HD needed today  s/p renal biopsy 8/22    GI  NPO for now, on tube feeds  would start GI ppx w IV PPI    Endocrine  T2DM   A1C 7.1 in 7/2023   - BG goal 140-200     ID   No fever/leukocytosis, recheck temp   Hx latent TB which was treated, no concern for TB   - grew MAC on AFB culture from most recent admission. would call ID consult  Started on empiric vancomycin and aztreonam,  changed to cefepime 8/12, cefepime dced on 8/13(cefepime/zosyn allergy.  developed rash when on cefepime on previous admission ) . started on AVALOX on 8/13, off ABx on 8/14. ptn has an allergic rash, prob 2/2 cefepime  - f/u MRSA   - all cxs NTD    Heme/Onc  ppx: heparin q8 hrs     Ethics  GOC - Discussed GOC with daughter and , they have opted in the past for full code. and she remains full code at present

## 2023-08-22 NOTE — PROGRESS NOTE ADULT - SUBJECTIVE AND OBJECTIVE BOX
Subjective: Patient seen and examined. No new events except as noted.   remains intubated ICU   sedated on Precedex  On Nicardipine gtt  REVIEW OF SYSTEMS:    unable to obtain     MEDICATIONS:  MEDICATIONS  (STANDING):  albuterol/ipratropium for Nebulization 3 milliLiter(s) Nebulizer every 8 hours  atorvastatin 40 milliGRAM(s) Oral at bedtime  chlorhexidine 0.12% Liquid 15 milliLiter(s) Oral Mucosa every 12 hours  chlorhexidine 2% Cloths 1 Application(s) Topical daily  dexMEDEtomidine Infusion 0.1 MICROgram(s)/kG/Hr (1.66 mL/Hr) IV Continuous <Continuous>  dextrose 5%. 1000 milliLiter(s) (50 mL/Hr) IV Continuous <Continuous>  dextrose 5%. 1000 milliLiter(s) (100 mL/Hr) IV Continuous <Continuous>  dextrose 50% Injectable 12.5 Gram(s) IV Push once  dextrose 50% Injectable 25 Gram(s) IV Push once  dextrose 50% Injectable 25 Gram(s) IV Push once  glucagon  Injectable 1 milliGRAM(s) IntraMuscular once  insulin lispro (ADMELOG) corrective regimen sliding scale   SubCutaneous every 6 hours  insulin NPH human recombinant 18 Unit(s) SubCutaneous every 6 hours  labetalol 500 milliGRAM(s) Oral three times a day  niCARdipine Infusion 5 mG/Hr (25 mL/Hr) IV Continuous <Continuous>  predniSONE   Tablet 40 milliGRAM(s) Oral daily      PHYSICAL EXAM:  T(C): 36.8 (08-22-23 @ 08:00), Max: 37.7 (08-21-23 @ 20:00)  HR: 66 (08-22-23 @ 11:00) (61 - 89)  BP: --  RR: 12 (08-22-23 @ 11:00) (12 - 25)  SpO2: 100% (08-22-23 @ 11:00) (96% - 100%)  Wt(kg): --  I&O's Summary    21 Aug 2023 07:01  -  22 Aug 2023 07:00  --------------------------------------------------------  IN: 2176.5 mL / OUT: 2700 mL / NET: -523.5 mL    22 Aug 2023 07:01  -  22 Aug 2023 11:32  --------------------------------------------------------  IN: 154.3 mL / OUT: 0 mL / NET: 154.3 mL            Appearance: NAD, intubated, sedated	  HEENT: dry oral mucosa, LIJ AMBROCIO   Lymphatic: No lymphadenopathy  Cardiovascular: Normal S1 S2, No JVD, No murmurs, No edema  Respiratory: Decreased BS, intubated on ventilator	  Psychiatry: A & O x 0, sedated  Gastrointestinal: Soft, Non-tender, + BS, + OGT	  Skin: No rashes, No ecchymoses, No cyanosis	  Extremities: No strength/ROM 2/2 sedation, + BL LE edema  Vascular: Peripheral pulses palpable 2+ bilaterally  +willard      LABS:    CARDIAC MARKERS:    Blood Gas Profile - Arterial (08.22.23 @ 00:35)   pH, Arterial: 7.36  pCO2, Arterial: 49 mmHg  pO2, Arterial: 95 mmHg  HCO3, Arterial: 28 mmol/L  Base Excess, Arterial: 1.9 mmol/L  Oxygen Saturation, Arterial: 98.1 %  Total CO2, Arterial: 29 mmol/L  FIO2, Arterial: 30  Blood Gas Source Arterial: ArterialBlood Gas Arterial, Lactate: 0.9 mmol/L (08.22.23 @ 00:35)                             8.0    18.73 )-----------( 302      ( 22 Aug 2023 00:35 )             25.4     08-22    135  |  98  |  47<H>  ----------------------------<  292<H>  3.7   |  25  |  1.93<H>    Ca    8.5      22 Aug 2023 00:35  Phos  3.7     08-22  Mg     2.30     08-22    TPro  6.3  /  Alb  2.8<L>  /  TBili  0.2  /  DBili  x   /  AST  43<H>  /  ALT  30  /  AlkPhos  210<H>  08-22    proBNP:   Lipid Profile:   HgA1c:   TSH:             TELEMETRY: 	SR    ECG:  	  RADIOLOGY:   DIAGNOSTIC TESTING:  [ ] Echocardiogram:  [ ]  Catheterization:  [ ] Stress Test:    OTHER:

## 2023-08-22 NOTE — CHART NOTE - NSCHARTNOTEFT_GEN_A_CORE
NUTRITION FOLLOW-UP      73 y.o. DM, HTN, CKD, CHFpEF, breast Ca, respiratory and cardiac arrest (2018), CVA w residual weakness s/p trach/PEG which has since been removed.  Presenting with respiratory distress requiring intubation.   Pt. with acute hypoxic respiratory failure with hypoxia and hypercapnia which is multifactorial including aspiration and acute on chronic diastolic CHF.  Also with RUSS with trial of diuretics and no improvement, s/p HD x 2 session.  Weight variations likely fluid related.    Pt. remains intubated @ this time.  Pending renal Bx, hence NPO and TF on hold.    Prior to NPO was consistently receiving Nepro @ prescribed goal of 40mL/hr which provided 960mL total volume, 1728 kcals, 78g protein.  This regimen possibly provides excessive kcals and suboptimal protein.    Upon chart review and discussion with RN,  Pt. without any noted/reported intolerance to TF prior to being held for procedure (no nausea/vomiting/constipation or abdominal distention).  +Liquid/loose stool (8/21).      Persistently elevated glucose levels.... steroid likely contributory.  Noted NPH increased today (8/22).         _________________Diet___________________  Diet, NPO:   NPO for Procedure/Test     NPO Start Date: 22-Aug-2023,   NPO Start Time: 07:38  Except Medications (08-22-23 @ 07:39) [Active]      Weight:                    Height:   61"                 Upper 10% Ideal Body Weight:  115.5lbs / 52.5kg   67.1kg (8/21)  74.7kg (8/16)  68.6kg (8/13)  66.5kg (8/11)              _____Estimated Energy Needs (based upper 10% Ideal Body Weight)_____  25-30 kcals/kg = 9637-7973 kcals/d  1.6-1.8.g protein/kg = 84-95g protein/d        Edema: 1+ generalized edema.   Skin: Right heel stage I pressure injury        ________________________Pertinent Medications____________   MEDICATIONS  (STANDING):  albuterol/ipratropium for Nebulization 3 milliLiter(s) Nebulizer every 8 hours  atorvastatin 40 milliGRAM(s) Oral at bedtime  chlorhexidine 0.12% Liquid 15 milliLiter(s) Oral Mucosa every 12 hours  chlorhexidine 2% Cloths 1 Application(s) Topical daily  dexMEDEtomidine Infusion 0.1 MICROgram(s)/kG/Hr (1.66 mL/Hr) IV Continuous <Continuous>  dextrose 5%. 1000 milliLiter(s) (50 mL/Hr) IV Continuous <Continuous>  dextrose 5%. 1000 milliLiter(s) (100 mL/Hr) IV Continuous <Continuous>  dextrose 50% Injectable 25 Gram(s) IV Push once  dextrose 50% Injectable 25 Gram(s) IV Push once  dextrose 50% Injectable 12.5 Gram(s) IV Push once  glucagon  Injectable 1 milliGRAM(s) IntraMuscular once  insulin lispro (ADMELOG) corrective regimen sliding scale   SubCutaneous every 6 hours  insulin NPH human recombinant 18 Unit(s) SubCutaneous every 6 hours  labetalol 500 milliGRAM(s) Oral three times a day  niCARdipine Infusion 5 mG/Hr (25 mL/Hr) IV Continuous <Continuous>  predniSONE   Tablet 40 milliGRAM(s) Oral daily    MEDICATIONS  (PRN):  acetaminophen   Oral Liquid .. 650 milliGRAM(s) Oral every 6 hours PRN Temp greater or equal to 38C (100.4F), Mild Pain (1 - 3)  dextrose Oral Gel 15 Gram(s) Oral once PRN Blood Glucose LESS THAN 70 milliGRAM(s)/deciliter          _________________________Pertinent Labs____________________     08-22    135  |  98  |  47<H>  ----------------------------<  292<H>  3.7   |  25  |  1.93<H>    Ca    8.5      22 Aug 2023 00:35  Phos  3.7     08-22  Mg     2.30     08-22    TPro  6.3  /  Alb  2.8<L>  /  TBili  0.2  /  DBili  x   /  AST  43<H>  /  ALT  30  /  AlkPhos  210<H>  08-22                                                                   8.0    18.73 )-----------( 302      ( 22 Aug 2023 00:35 )             25.4         CAPILLARY BLOOD GLUCOSE      POCT Blood Glucose.: 229 mg/dL (22 Aug 2023 12:11)    POCT Blood Glucose.: 229 mg/dL (08-22-23 @ 12:11)  POCT Blood Glucose.: 246 mg/dL (08-22-23 @ 05:10)  POCT Blood Glucose.: 307 mg/dL (08-21-23 @ 23:06)  POCT Blood Glucose.: 324 mg/dL (08-21-23 @ 17:26)              PLAN/RECOMMENDATIONS:    1) When medically appropriate, resume enteral nutrition w Nepro @   2) Obtain daily weights  3)     RDN remains available and will f/u PRN.          Jaylene Ybarra RDN, CDN       pager [phone] or MS Teams NUTRITION FOLLOW-UP      73 y.o. DM, HTN, CKD, CHFpEF, breast Ca, respiratory and cardiac arrest (2018), CVA w residual weakness s/p trach/PEG which has since been removed.  Presenting with respiratory distress requiring intubation.   Pt. with acute hypoxic respiratory failure with hypoxia and hypercapnia which is multifactorial including aspiration and acute on chronic diastolic CHF.  Also with RUSS with trial of diuretics and no improvement, s/p HD x 2 session.  Weight variations likely fluid related.    Pt. remains intubated @ this time.  Pending renal Bx, hence NPO and TF on hold.  Upon chart review and discussion with RN,  Pt. without any noted/reported intolerance to TF prior to being held for procedure (no nausea/vomiting/constipation or abdominal distention).  +Liquid/loose stool (8/21).    Prior to NPO was consistently receiving Nepro @ prescribed goal of 40mL/hr which provided 960mL total volume, 1728 kcals, 78g protein.  This regimen possibly provides excessive kcals and suboptimal protein intake.  Noted persistently elevated glucose levels.... steroid likely somewhat contributory. Noted NPH increased today (8/22).   Once medically appropriate to resume TF, advise decreasing goal of Nepro to 35mL/hr with addition of NoCarb Prosource 1x daily.         _________________Diet___________________  Diet, NPO:   NPO for Procedure/Test     NPO Start Date: 22-Aug-2023,   NPO Start Time: 07:38  Except Medications (08-22-23 @ 07:39) [Active]      Weight:                    Height:   61"                 Upper 10% Ideal Body Weight:  115.5lbs / 52.5kg   67.1kg (8/21)  74.7kg (8/16)  68.6kg (8/13)  66.5kg (8/11)          _____Estimated Energy Needs (based upper 10% Ideal Body Weight)_____  25-30 kcals/kg = 8621-2045 kcals/d  1.5-1.8.g protein/kg = 80-95g protein/d        Edema: 1+ generalized edema.   Skin: Right heel stage I pressure injury        ________________________Pertinent Medications____________   MEDICATIONS  (STANDING):  albuterol/ipratropium for Nebulization 3 milliLiter(s) Nebulizer every 8 hours  atorvastatin 40 milliGRAM(s) Oral at bedtime  chlorhexidine 0.12% Liquid 15 milliLiter(s) Oral Mucosa every 12 hours  chlorhexidine 2% Cloths 1 Application(s) Topical daily  dexMEDEtomidine Infusion 0.1 MICROgram(s)/kG/Hr (1.66 mL/Hr) IV Continuous <Continuous>  dextrose 5%. 1000 milliLiter(s) (50 mL/Hr) IV Continuous <Continuous>  dextrose 5%. 1000 milliLiter(s) (100 mL/Hr) IV Continuous <Continuous>  dextrose 50% Injectable 25 Gram(s) IV Push once  dextrose 50% Injectable 25 Gram(s) IV Push once  dextrose 50% Injectable 12.5 Gram(s) IV Push once  glucagon  Injectable 1 milliGRAM(s) IntraMuscular once  insulin lispro (ADMELOG) corrective regimen sliding scale   SubCutaneous every 6 hours  insulin NPH human recombinant 18 Unit(s) SubCutaneous every 6 hours  labetalol 500 milliGRAM(s) Oral three times a day  niCARdipine Infusion 5 mG/Hr (25 mL/Hr) IV Continuous <Continuous>  predniSONE   Tablet 40 milliGRAM(s) Oral daily    MEDICATIONS  (PRN):  acetaminophen   Oral Liquid .. 650 milliGRAM(s) Oral every 6 hours PRN Temp greater or equal to 38C (100.4F), Mild Pain (1 - 3)  dextrose Oral Gel 15 Gram(s) Oral once PRN Blood Glucose LESS THAN 70 milliGRAM(s)/deciliter          _________________________Pertinent Labs____________________     08-22    135  |  98  |  47<H>  ----------------------------<  292<H>  3.7   |  25  |  1.93<H>    Ca    8.5      22 Aug 2023 00:35  Phos  3.7     08-22  Mg     2.30     08-22    TPro  6.3  /  Alb  2.8<L>  /  TBili  0.2  /  DBili  x   /  AST  43<H>  /  ALT  30  /  AlkPhos  210<H>  08-22                                                                   8.0    18.73 )-----------( 302      ( 22 Aug 2023 00:35 )             25.4         CAPILLARY BLOOD GLUCOSE      POCT Blood Glucose.: 229 mg/dL (22 Aug 2023 12:11)    POCT Blood Glucose.: 229 mg/dL (08-22-23 @ 12:11)  POCT Blood Glucose.: 246 mg/dL (08-22-23 @ 05:10)  POCT Blood Glucose.: 307 mg/dL (08-21-23 @ 23:06)  POCT Blood Glucose.: 324 mg/dL (08-21-23 @ 17:26)              PLAN/RECOMMENDATIONS:    1) When medically appropriate, resume enteral nutrition... Nepro @ goal of 35mL/hr continuously x 24hrs + 1 packet NoCarb Prosource per day  provides:  840mL total volume  1512 kcals  68g protein (+ g additional protein via NoCarb Prosource)= 83g total protein  611mL free water     2) Obtain daily weights  3) Monitor tolerance to TF, GI status, electrolytes, glucose       RDN remains available and will f/u PRN.          Jaylene Ybarra RDN, CDN       pager [phone] or MS Teams

## 2023-08-22 NOTE — PROGRESS NOTE ADULT - SUBJECTIVE AND OBJECTIVE BOX
*******************************  Martha Douglas PGY-2  *******************************    INTERVAL HPI/OVERNIGHT EVENTS: -150ss/30-40s. Insulin NPH 18U (up from 16U). IR to Bx kidney today.    SUBJECTIVE: Patient seen and examined at bedside.     OBJECTIVE:    VITAL SIGNS:  ICU Vital Signs Last 24 Hrs  T(C): 36.8 (22 Aug 2023 08:00), Max: 37.7 (21 Aug 2023 20:00)  T(F): 98.2 (22 Aug 2023 08:00), Max: 99.9 (21 Aug 2023 20:00)  HR: 66 (22 Aug 2023 11:00) (61 - 89)  BP: --  BP(mean): --  ABP: 157/40 (22 Aug 2023 11:00) (141/33 - 169/47)  ABP(mean): 82 (22 Aug 2023 11:00) (70 - 97)  RR: 12 (22 Aug 2023 11:00) (12 - 25)  SpO2: 100% (22 Aug 2023 11:00) (96% - 100%)    O2 Parameters below as of 22 Aug 2023 11:00  Patient On (Oxygen Delivery Method): ventilator    O2 Concentration (%): 30    Mode: AC/ CMV (Assist Control/ Continuous Mandatory Ventilation)  RR (machine): 12  TV (machine): 360  FiO2: 30  PEEP: 5  ITime: 0.9  MAP: 10  PIP: 36  Mode: AC/ CMV (Assist Control/ Continuous Mandatory Ventilation), RR (machine): 12, TV (machine): 360, FiO2: 30, PEEP: 5, ITime: 0.8, MAP: 9, PIP: 36    I&O's Summary    21 Aug 2023 07:01  -  22 Aug 2023 07:00  --------------------------------------------------------  IN: 2176.5 mL / OUT: 2700 mL / NET: -523.5 mL    22 Aug 2023 07:01  -  22 Aug 2023 11:44  --------------------------------------------------------  IN: 154.3 mL / OUT: 0 mL / NET: 154.3 mL      CAPILLARY BLOOD GLUCOSE      POCT Blood Glucose.: 246 mg/dL (22 Aug 2023 05:10)        PHYSICAL EXAM:  GENERAL: NAD, well-developed  HEAD:  Atraumatic, Normocephalic  EYES: EOMI, PERRLA, conjunctiva and sclera clear  NECK: Supple, No JVD  CHEST/LUNG: Clear to auscultation bilaterally; No wheeze  HEART: Regular rate and rhythm; No murmurs, rubs, or gallops  ABDOMEN: Soft, Nontender, Nondistended; Bowel sounds present  EXTREMITIES:  2+ Peripheral Pulses, No clubbing, cyanosis, or edema  NEUROLOGY: non-focal, sedated  SKIN: No rashes or lesions  Lines:      MEDICATIONS:  MEDICATIONS  (STANDING):  albuterol/ipratropium for Nebulization 3 milliLiter(s) Nebulizer every 8 hours  atorvastatin 40 milliGRAM(s) Oral at bedtime  chlorhexidine 0.12% Liquid 15 milliLiter(s) Oral Mucosa every 12 hours  chlorhexidine 2% Cloths 1 Application(s) Topical daily  dexMEDEtomidine Infusion 0.1 MICROgram(s)/kG/Hr (1.66 mL/Hr) IV Continuous <Continuous>  dextrose 5%. 1000 milliLiter(s) (50 mL/Hr) IV Continuous <Continuous>  dextrose 5%. 1000 milliLiter(s) (100 mL/Hr) IV Continuous <Continuous>  dextrose 50% Injectable 12.5 Gram(s) IV Push once  dextrose 50% Injectable 25 Gram(s) IV Push once  dextrose 50% Injectable 25 Gram(s) IV Push once  glucagon  Injectable 1 milliGRAM(s) IntraMuscular once  insulin lispro (ADMELOG) corrective regimen sliding scale   SubCutaneous every 6 hours  insulin NPH human recombinant 18 Unit(s) SubCutaneous every 6 hours  labetalol 500 milliGRAM(s) Oral three times a day  niCARdipine Infusion 5 mG/Hr (25 mL/Hr) IV Continuous <Continuous>  predniSONE   Tablet 40 milliGRAM(s) Oral daily    MEDICATIONS  (PRN):  acetaminophen   Oral Liquid .. 650 milliGRAM(s) Oral every 6 hours PRN Temp greater or equal to 38C (100.4F), Mild Pain (1 - 3)  dextrose Oral Gel 15 Gram(s) Oral once PRN Blood Glucose LESS THAN 70 milliGRAM(s)/deciliter      ALLERGIES:  Allergies    isoniazid (Rash)  nafcillin (Unknown)  hydrALAZINE (Rash)  vitamin E (Short breath; Urticaria; Hives)  doxycycline (Rash)  cefepime (Rash)  NIFEdipine (Urticaria; Hives)    Intolerances        LABS:                        8.0    18.73 )-----------( 302      ( 22 Aug 2023 00:35 )             25.4     08-22    135  |  98  |  47<H>  ----------------------------<  292<H>  3.7   |  25  |  1.93<H>    Ca    8.5      22 Aug 2023 00:35  Phos  3.7     08-22  Mg     2.30     08-22    TPro  6.3  /  Alb  2.8<L>  /  TBili  0.2  /  DBili  x   /  AST  43<H>  /  ALT  30  /  AlkPhos  210<H>  08-22      Urinalysis Basic - ( 21 Aug 2023 00:30 )    Color: x / Appearance: x / SG: x / pH: x  Gluc: 340 mg/dL / Ketone: x  / Bili: x / Urobili: x   Blood: x / Protein: x / Nitrite: x   Leuk Esterase: x / RBC: x / WBC x   Sq Epi: x / Non Sq Epi: x / Bacteria: x        RADIOLOGY & ADDITIONAL TESTS: Reviewed.

## 2023-08-22 NOTE — PROGRESS NOTE ADULT - ATTENDING COMMENTS
73 F with DM, CKD, HFpEF, h/o CVA and trach and PEG (now removed) here with acute hypoxemic and hypercapnic respiratory failure requiring intubation in setting of RUSS and acute pulmonary edema, possible ATN vs AIN    now with episodes of very high blood pressure not responsive to sedation  exam unchanged  BP better controlled  IR and anesthesia cancelling kidney biopsy today because they do not think patient is safe to be proned because of hypoxemia.  From medical/ICU/pulmonary standpoint, patient is safe to be proned and can be proned for kidney biopsy.  Biopsy medically helpful as then we would potentially be able to stop a medication she may not need.    # acute hypoxemic and hypercapnic respiratory failure  # RUSS/ATN/AIN  # acute pulmonary edema  # essential hypertension  - c/w full vent support for now  - wean sedation as tolerated  - increase labetalol, wean cardene as tolerated  - HD for fluid removal and to help with acute metabolic encephalopathy   - plan for renal biopsy if IR/anesthesia able to proceed.

## 2023-08-22 NOTE — PROGRESS NOTE ADULT - SUBJECTIVE AND OBJECTIVE BOX
Tolerated 2L net UF with HD yesterday  Remains intubated; sedated on Precedex  On Nicardipine gtt      VITAL:  T(C): , Max: 37.7 (08-21-23 @ 20:00)  T(F): , Max: 99.9 (08-21-23 @ 20:00)  HR: 62 (08-22-23 @ 09:15)  BP: 152/36  RR: 5 (08-22-23 @ 09:15)  SpO2: 100% (08-22-23 @ 09:15)      PHYSICAL EXAM:  Constitutional: intubated/sedated  HEENT: (+)ETT/OGT  Neck: Supple, No JVD  Respiratory: coarse BS b/l/decreased at bases  Cardiovascular: RRR s1s2, no m/r/g  Gastrointestinal: BS+, soft, NT/ND  Extremities: No peripheral edema b/l  Back: no CVAT b/l  Skin: No rashes, no nevi  Access: shiley      LABS:                        8.0    18.73 )-----------( 302      ( 22 Aug 2023 00:35 )             25.4     Na(135)/K(3.7)/Cl(98)/HCO3(25)/BUN(47)/Cr(1.93)Glu(292)/Ca(8.5)/Mg(2.30)/PO4(3.7)    08-22 @ 00:35  Na(136)/K(3.7)/Cl(99)/HCO3(27)/BUN(38)/Cr(1.68)Glu(334)/Ca(8.6)/Mg(2.20)/PO4(3.9)    08-21 @ 16:52  Na(131)/K(4.4)/Cl(97)/HCO3(27)/BUN(63)/Cr(2.82)Glu(340)/Ca(9.1)/Mg(2.50)/PO4(3.9)    08-21 @ 00:30  Na(139)/K(3.1)/Cl(101)/HCO3(28)/BUN(47)/Cr(2.42)Glu(213)/Ca(9.2)/Mg(2.50)/PO4(2.8)    08-20 @ 00:41      IMPRESSION: 73F w/ HTN, DM2, CVA, breast CA-bilateral mastectomy, recurrent aspiration pneumonia/respiratory failure, and CKD, 8/11/23 p/w acute hypercapnic respiratory failure; c/b RUSS    (1)Renal - CKD4     (2)RUSS - most likely ischemic ATN; less likely but possibly AIN. S/p 2nd HD session yesterday; hopefully no further HD is needed. On empiric Prednisone 40mg po qd. Planned for renal biopsy today.    (3)Lytes - acceptable    (4)Pulm- hypercapnic respiratory failure on admission - remains intubated    (5)CV - hypertensive; on Nicardipine gtt. Hypervolemia improving with HD    (6)ID - PNA - s/p IV abx        RECOMMEND:  (1)Prednisone as ordered for now  (2)IR guided biopsy today as planned  (3)No HD for today; will continue to assess need on a daily basis  (4)Dose new meds for GFR<10/HD            Jimmy Colon MD  [facility]  Office/on call physician: [phone]  Cell (7a-7p): [phone]

## 2023-08-22 NOTE — PROGRESS NOTE ADULT - ASSESSMENT
72 y/o F DM on insulin, HTN, CKD, CHFpEF, breast CA, respiratory arrest and cardiac arrest (2018), CVA with residual weakness, aspiration PNA h/o trache, PEG, both removed presents with respiratory distress requiring intubation. May be volume overload i/s/o CHF/CKD vs decrease respiratory drive (given oxygen at home). Improving mental status however worsening renal function and anuria, concerning for ATN vs AIN.     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 No Hx Afib or documented   STEPHANIE with no TRINITY thrombus or intracardiac shunts   - avoid hypertension with BP < 180 systolic   - holding ASA for possible kidney biopsy planned for 8/22   - c/w statin   - 8/22: wean sedation as tolerated    Cardiac   #Hypertension  - To increase labetalol to 500  - 8/22: D/c Nicardipine    #Shock  #Hypertension   - Initially vasoplegic from prop  - weaned vaso and levo, now hypertensive likely from fluid overload with renal dysfunction   > changed coreg to labetalol and uptitrated to 400 TID for BP control given CVA   > on nitro drip     #CHFpEF   TTE 7/2023 with EF 59%, with severe LVH and diastolic dysfunction   pro-BNP > 65061, trop 78   Repeat TTE with EF 60% normal systolic and grade 1 diastolic dysfunction     Pulmonary   #Acute hypercapnic and hypoxemic respiratory failure   DDx: aspiration vs volume overload vs sedating medication decreasing drive (was given nyquil)   VBG with respiratory acidosis pCO2 111  CXR with small right pleural effusion, no consolidations/opacities  > c/w mechanical ventilation   > PS trials, plan for possible extubation 8/21  > monitor blood gas while on vent    - 8/21: d/c hypertonic saline    /Renal   #RUSS on CKD  Ddx: obstructive (willard in, no hydro on POCUS) vs low flow state vs AIN i/s/o cephalosporin use and drug rash   Urinalysis with 7 WBC, would not pursue steroids at this time per nephro  Worsening creatinine still but no urgent indication for HD, ATN   - kidney biopsy to r/o AIN planned 8/22   - cont empiric prednisone 40mg started 8/18   - trialed diuretics without improvement   - Continue to monitor, dose meds per eGFR. avoid nephrotoxic agents  - first HD session 8/19, will plan for additional fluid removal or HD sessions with nephro before extubation  - 8/22: IR kidney Bx, f/u results    #Hyperkalemia with EKG changes    7.2, hemolyzed, given insulin and D50 + calcium gluc   Likely i/s/o renal dysfunction   Received lasix 80 and 40 IV initially but without adequate response   Given bumex 2mg the morning of 8/12  Per nephro, started on bumex gtt with worsening creatinine   - decreasing urine output, now oliguric   - off bumex gtt     GI  Diet: NPO with tube feeds     Endocrine  #T2DM   A1C 7.1 in 7/2023   - c/w NPH to 14U q6 with mod SS  -- 8/21: If PM FS elevated, increase to 16U  -- 8/22: NPH 18U  - BG goal 140-200     ID   No fever/leukocytosis, recheck temp   Hx latent TB which was treated, no concern for TB   Started on empiric vancomycin and aztreonam (cefepime/zosyn allergy? developed rash req prednisone)   Trialed cefepime --> cefazolin (sputum MSSA), developed drug eruption - discontinued   Blood and urine cultures 8/11 demonstrating no growth currently  Rising leukocytosis with low grade temp  - start empiric antibx if febrile > 101  - BC NGTD  - willard removed     Heme/Onc  ppx: heparin q8 hrs   8/21: Coag Panel for IR/kidney bx 8/22 8/22: confirm with IR regarding restarting DVT PPx within 12-24h s/p IR kidney Bx    Ethics  GOC - Per previous GOC with daughter and , reported that they have not talked about end of life care with the patient. For now, they wanted "everything to be done" including CPR, [facility], press[facility]

## 2023-08-22 NOTE — PROGRESS NOTE ADULT - SUBJECTIVE AND OBJECTIVE BOX
Date of Service: 08-22-23 @ 11:02    Patient is a 73y old  Female who presents with a chief complaint of Respiratory distress (22 Aug 2023 09:44)      Any change in ROS: stil l ntubated and sedated:  family at bedside:       MEDICATIONS  (STANDING):  albuterol/ipratropium for Nebulization 3 milliLiter(s) Nebulizer every 8 hours  atorvastatin 40 milliGRAM(s) Oral at bedtime  chlorhexidine 0.12% Liquid 15 milliLiter(s) Oral Mucosa every 12 hours  chlorhexidine 2% Cloths 1 Application(s) Topical daily  dexMEDEtomidine Infusion 0.1 MICROgram(s)/kG/Hr (1.66 mL/Hr) IV Continuous <Continuous>  dextrose 5%. 1000 milliLiter(s) (100 mL/Hr) IV Continuous <Continuous>  dextrose 5%. 1000 milliLiter(s) (50 mL/Hr) IV Continuous <Continuous>  dextrose 50% Injectable 12.5 Gram(s) IV Push once  dextrose 50% Injectable 25 Gram(s) IV Push once  dextrose 50% Injectable 25 Gram(s) IV Push once  glucagon  Injectable 1 milliGRAM(s) IntraMuscular once  insulin lispro (ADMELOG) corrective regimen sliding scale   SubCutaneous every 6 hours  insulin NPH human recombinant 18 Unit(s) SubCutaneous every 6 hours  labetalol 500 milliGRAM(s) Oral three times a day  niCARdipine Infusion 5 mG/Hr (25 mL/Hr) IV Continuous <Continuous>  predniSONE   Tablet 40 milliGRAM(s) Oral daily    MEDICATIONS  (PRN):  acetaminophen   Oral Liquid .. 650 milliGRAM(s) Oral every 6 hours PRN Temp greater or equal to 38C (100.4F), Mild Pain (1 - 3)  dextrose Oral Gel 15 Gram(s) Oral once PRN Blood Glucose LESS THAN 70 milliGRAM(s)/deciliter    Vital Signs Last 24 Hrs  T(C): 36.8 (22 Aug 2023 08:00), Max: 37.7 (21 Aug 2023 20:00)  T(F): 98.2 (22 Aug 2023 08:00), Max: 99.9 (21 Aug 2023 20:00)  HR: 61 (22 Aug 2023 10:00) (61 - 89)  BP: --  BP(mean): --  RR: 12 (22 Aug 2023 10:00) (12 - 25)  SpO2: 100% (22 Aug 2023 10:00) (96% - 100%)    Parameters below as of 22 Aug 2023 10:00  Patient On (Oxygen Delivery Method): ventilator    O2 Concentration (%): 30  Mode: AC/ CMV (Assist Control/ Continuous Mandatory Ventilation)  RR (machine): 12  TV (machine): 360  FiO2: 30  PEEP: 5  ITime: 0.93  MAP: 10  PIP: 36    I&O's Summary    21 Aug 2023 07:01  -  22 Aug 2023 07:00  --------------------------------------------------------  IN: 2176.5 mL / OUT: 2700 mL / NET: -523.5 mL    22 Aug 2023 07:01  -  22 Aug 2023 11:02  --------------------------------------------------------  IN: 125.2 mL / OUT: 0 mL / NET: 125.2 mL          Physical Exam:   GENERAL: NAD, well-groomed, well-developed  HEENT: MANDY/   Atraumatic, Normocephalic  ENMT: No tonsillar erythema, exudates, or enlargement; Moist mucous membranes, Good dentition, No lesions  NECK: Supple, No JVD, Normal thyroid  CHEST/LUNG: Clear to auscultaion  CVS: Regular rate and rhythm; No murmurs, rubs, or gallops  GI: : Soft, Nontender, Nondistended; Bowel sounds present  NERVOUS SYSTEM:  sedated and intubated  EXTREMITIES- edema  LYMPH: No lymphadenopathy noted  SKIN: No rashes or lesions  ENDOCRINOLOGY: No Thyromegaly  PSYCH: Asedated    Labs:  ABG - ( 22 Aug 2023 00:35 )  pH, Arterial: 7.36  pH, Blood: x     /  pCO2: 49    /  pO2: 95    / HCO3: 28    / Base Excess: 1.9   /  SaO2: 98.1                                        8.0    18.73 )-----------( 302      ( 22 Aug 2023 00:35 )             25.4                         8.1    18.57 )-----------( 310      ( 21 Aug 2023 16:52 )             26.1                         7.7    16.20 )-----------( 301      ( 21 Aug 2023 06:50 )             24.5                         6.6    14.78 )-----------( 287      ( 21 Aug 2023 00:30 )             21.2                         6.5    14.89 )-----------( 302      ( 21 Aug 2023 00:15 )             21.0                         7.4    16.97 )-----------( 302      ( 20 Aug 2023 00:41 )             23.0                         7.6    14.80 )-----------( 237      ( 19 Aug 2023 00:20 )             23.4     08-22    135  |  98  |  47<H>  ----------------------------<  292<H>  3.7   |  25  |  1.93<H>  08-21    136  |  99  |  38<H>  ----------------------------<  334<H>  3.7   |  27  |  1.68<H>  08-21    131<L>  |  97<L>  |  63<H>  ----------------------------<  340<H>  4.4   |  27  |  2.82<H>  08-20    139  |  101  |  47<H>  ----------------------------<  213<H>  3.1<L>   |  28  |  2.42<H>  08-19    133<L>  |  96<L>  |  100<H>  ----------------------------<  251<H>  4.0   |  24  |  4.80<H>    Ca    8.5      22 Aug 2023 00:35  Ca    8.6      21 Aug 2023 16:52  Ca    9.1      21 Aug 2023 00:30  Phos  3.7     08-22  Phos  3.9     08-21  Phos  3.9     08-21  Mg     2.30     08-22  Mg     2.20     08-21  Mg     2.50     08-21    TPro  6.3  /  Alb  2.8<L>  /  TBili  0.2  /  DBili  x   /  AST  43<H>  /  ALT  30  /  AlkPhos  210<H>  08-22  TPro  5.9<L>  /  Alb  2.6<L>  /  TBili  0.2  /  DBili  x   /  AST  70<H>  /  ALT  31  /  AlkPhos  203<H>  08-21  TPro  6.5  /  Alb  2.7<L>  /  TBili  0.2  /  DBili  x   /  AST  26  /  ALT  5   /  AlkPhos  226<H>  08-20  TPro  5.6<L>  /  Alb  2.3<L>  /  TBili  0.2  /  DBili  x   /  AST  36<H>  /  ALT  9   /  AlkPhos  249<H>  08-19    CAPILLARY BLOOD GLUCOSE      POCT Blood Glucose.: 246 mg/dL (22 Aug 2023 05:10)  POCT Blood Glucose.: 307 mg/dL (21 Aug 2023 23:06)  POCT Blood Glucose.: 324 mg/dL (21 Aug 2023 17:26)  POCT Blood Glucose.: 397 mg/dL (21 Aug 2023 11:48)      LIVER FUNCTIONS - ( 22 Aug 2023 00:35 )  Alb: 2.8 g/dL / Pro: 6.3 g/dL / ALK PHOS: 210 U/L / ALT: 30 U/L / AST: 43 U/L / GGT: x           PT/INR - ( 22 Aug 2023 00:35 )   PT: 11.5 sec;   INR: 1.03 ratio         PTT - ( 22 Aug 2023 00:35 )  PTT:29.7 sec  Urinalysis Basic - ( 22 Aug 2023 00:35 )    Color: x / Appearance: x / SG: x / pH: x  Gluc: 292 mg/dL / Ketone: x  / Bili: x / Urobili: x   Blood: x / Protein: x / Nitrite: x   Leuk Esterase: x / RBC: x / WBC x   Sq Epi: x / Non Sq Epi: x / Bacteria: x            RECENT CULTURES:  08-16 @ 20:46 .Blood Blood-Peripheral                No growth at 5 days    08-16 @ 20:13 .Blood Blood-Peripheral       rad< from: Xray Chest 1 View- PORTABLE-Urgent (Xray Chest 1 View- PORTABLE-Urgent .) (08.19.23 @ 18:37) >    ******PRELIMINARY REPORT******         ACC: [id number] EXAM:  XR CHEST PORTABLE URGENT 1V   ORDERED BY: JAMIL SANCHEZ     PROCEDURE DATE:  08/19/2023    ******PRELIMINARY REPORT******      ******PRELIMINARYREPORT******           INTERPRETATION:  left IJ with tip is at distal brachiocephalic   vein/proximal SVC. R IJ with tip in the SVC.        ******PRELIMINARY REPORT******      ******PRELIMINARY REPORT******       BESSIE GRAVES MD; Resident Radiologist  This document is a PRELIMINARY interpretation and is pending final   attending approval. Aug 19 2023  8:32PM    < end of copied text >  < from: MR Head No Cont (08.17.23 @ 19:58) >  evidence of acute intracranial hemorrhage. Chronic left PCA territory   infarct with associated ex vacuo dilatation of the occipital horn of the   left lateral ventricle. Ventricles and sulci are prominent consistent   with age-related parenchymal volume loss. No midline shift or other   significant mass effect is noted. Gray-white differentiation is   maintained throughout. Normal flow voids are maintained in the dominant   arterial and venous structures. There are patchy and confluent foci of   hyperintense T2 signal within the subcortical and periventricular white   matter which are nonspecific but likely related to chronic microvascular   ischemic disease.    Moderate mucosal thickening of the paranasal sinuses. The tympanomastoid   spaces are clear. Orbits and orbital contents are unremarkable.    IMPRESSION:    MOTION LIMITED STUDY. ACUTE INFARCT INVOLVING THE RIGHT CEREBELLUM. THERE   IS NO EVIDENCE OF ACUTE INTRACRANIAL HEMORRHAGE.    --- End of Report ---            LUCIA PHAM MD; Attending Radiologist  This document has been electronically signed. Aug 17 2023  8:41PM    < end of copied text >           No growth at 5 days    08-16 @ 15:00 .Sputum Sputum       Few polymorphonuclear leukocytes per low power field  Rare Squamous epithelial cells per low power field  Few Gram Positive Cocci in Clusters per oil power field           Normal Respiratory Cate present          RESPIRATORY CULTURES:          Studies  Chest X-RAY  CT SCAN Chest   Venous Dopplers: LE:   CT Abdomen  Others

## 2023-08-22 NOTE — PROGRESS NOTE ADULT - ASSESSMENT
74 y/o F DM on insulin, HTN, CKD, CHFpEF, breast CA, respiratory arrest and cardiac arrest (2018), CVA with residual weakness, aspiration PNA h/o trache, PEG, both removed presents with respiratory distress requiring intubation. Found to have elevated BNP, may be 2/2 to volume overload i/s/o CKD vs CHF.     Neuro ;  - Sedated : Baseline MS Angelita   -WAS ON PROPOFOL AND FENTANYL BEFORE: NOW OFF:    - Monitor her mental status:  requiring only 30% fio2:    -8/15: - now she has been off sedation for more then 24 hours but is still lethargic: her o2 requirement has not increased:   -for possible extubation if she wakes up   8/16:: -dw PMD: pt is still lethargic  for MRI brain stem today   8/17": -had ct head: OK: awaiting MRI brain : still lethargic  8/18: seemed same to me: family at bedside who says she is little  bit more awake: oxygen requirement is same:  at 30%:off vasopressors   -now neuro note reviewed:  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 reason for inability ot trigger vent and co2 retention initially ? candidate for trach    -now the co2 i s normal : not much improvement in her mental statis:  off sedation for days   /8/20: still lethargic: on precedex;  cxr reviewed:  last time at [facility] she was found to have cavity in RUL : she was ruled out for tb : her AFB was positive but was MAC /; defer to MICU   8/22"  still sedated:  no sob:  on 30% fio2:  on precedex:  and is on nicardipine as wellas labetelol ; for renal biopsy today    CVA   - cont aspirin and statin   //-per neurology   8/22: sedated  neuro following:    Cardiac   -CHFpEF : TTE 7/2023 with EF 59%, with severe LVH and diastolic dysfunction : pro-BNP > 82759, trop 78   -on bumex drip :  -cards following   8/15: : she is off bumex drip and is on midodrine   8/16: remains off bumex  8/18:: off bumex:  defer to MICU team   cont to remain off diuretics   8/20: she seems same to me: in renal failure off diuretics   Pulmonary   -Acute hypercapnic and hypoxemic respiratory failure   -DDx: aspiration vs volume overload  VBG with respiratory acidosis pCO2 111  - CXR with small right pleural effusion, no consolidations/opacities  -now she seems better:  fio2: 30$:  -? etiology of hypercarbia:  multifactorial : seems to have OHS and TYRONE superimposed by acute chf  ? and aspiration pneumonia:"   -pt is mostly bed bound:   -it is possible that this time:  she mght need bipap on dc : atleast at the night time:   -on 8/15:  still remains intubated  8/16:still lethargic:  not extubated hypercarbic acidotic today on abg:  weaning trials :probably would need bipap following extubation  8/17: :still lethargic: :awaiting mri brain : dw    8/18: :MRI done has new right cerebellar infarct on mri : defer to MICU   -seen by bora now:  has brain stem infarct to per neuro note:  reason for being vent;    8/22: awaiting renal bipsy today  : and then aggressive weaning  /Renal   -Hyperkalemia with EKG changes  : 7.2, hemolyzed, given insulin and D50 + calcium gluc   -K is better today : cont to monitor  -normal today on 8/15   8/16:stable  8/17:-k has been ok for l ast few days  8/18: stable  8/19: started on prednisone yesterday for suspected AIN by renal    8/20: wbc is high : likely secondary to steroids  she has no fveR:  ruled out for tb last time   8/22: for renal biopsy today   GI NPO for now  Endocrine  T2DM : A1C 7.1 in 7/2023   -cont to monitor blood glucose  ID   - No fever/leukocytosis  - Hx latent TB which was treated, no concern for TB  : She was recently ruled out for tuberculosis at [facility]   - Started on empiric vancomycin and aztreonam (cefepime/zosyn allergy? developed rash req prednisone)  : now dced:   defer to MICU   - f/u MRSA   - follow up blood culture/urine   8/16:blood cultures negative so far: legionella is negative:  remains off antibiotics  staph aureus on sputum   8/17:: off antibiotics at this time: secondary to drug eruption  8/18: remains off antibiotics   -8/19: off diuretics   8/20: off antibiotics   8/22: off antibiotics     dw micu and  at bedside as wellas MICU attending   overall prognosis seems guarded

## 2023-08-22 NOTE — PROGRESS NOTE ADULT - SUBJECTIVE AND OBJECTIVE BOX
Patient is a 73y old  Female who presents with a chief complaint of Respiratory distress (22 Aug 2023 11:41)      SUBJECTIVE / OVERNIGHT EVENTS: remains intubated in MICU, s/p renal biopsy today. no need for HD today    MEDICATIONS  (STANDING):  albuterol/ipratropium for Nebulization 3 milliLiter(s) Nebulizer every 8 hours  atorvastatin 40 milliGRAM(s) Oral at bedtime  chlorhexidine 0.12% Liquid 15 milliLiter(s) Oral Mucosa every 12 hours  chlorhexidine 2% Cloths 1 Application(s) Topical daily  dexMEDEtomidine Infusion 0.1 MICROgram(s)/kG/Hr (1.66 mL/Hr) IV Continuous <Continuous>  dextrose 5%. 1000 milliLiter(s) (50 mL/Hr) IV Continuous <Continuous>  dextrose 5%. 1000 milliLiter(s) (100 mL/Hr) IV Continuous <Continuous>  dextrose 50% Injectable 12.5 Gram(s) IV Push once  dextrose 50% Injectable 25 Gram(s) IV Push once  dextrose 50% Injectable 25 Gram(s) IV Push once  glucagon  Injectable 1 milliGRAM(s) IntraMuscular once  insulin lispro (ADMELOG) corrective regimen sliding scale   SubCutaneous every 6 hours  insulin NPH human recombinant 18 Unit(s) SubCutaneous every 6 hours  labetalol 500 milliGRAM(s) Oral three times a day  niCARdipine Infusion 5 mG/Hr (25 mL/Hr) IV Continuous <Continuous>  predniSONE   Tablet 40 milliGRAM(s) Oral daily    MEDICATIONS  (PRN):  acetaminophen   Oral Liquid .. 650 milliGRAM(s) Oral every 6 hours PRN Temp greater or equal to 38C (100.4F), Mild Pain (1 - 3)  dextrose Oral Gel 15 Gram(s) Oral once PRN Blood Glucose LESS THAN 70 milliGRAM(s)/deciliter      Vital Signs Last 24 Hrs  T(F): 99.2 (08-22-23 @ 20:00), Max: 99.6 (08-22-23 @ 00:00)  HR: 72 (08-22-23 @ 22:41) (61 - 74)  BP: --  RR: 15 (08-22-23 @ 22:41) (12 - 25)  SpO2: 100% (08-22-23 @ 22:41) (98% - 100%)  Telemetry:   CAPILLARY BLOOD GLUCOSE      POCT Blood Glucose.: 265 mg/dL (22 Aug 2023 17:53)  POCT Blood Glucose.: 229 mg/dL (22 Aug 2023 12:11)  POCT Blood Glucose.: 246 mg/dL (22 Aug 2023 05:10)  POCT Blood Glucose.: 307 mg/dL (21 Aug 2023 23:06)    I&O's Summary    21 Aug 2023 07:01  -  22 Aug 2023 07:00  --------------------------------------------------------  IN: 2289 mL / OUT: 2700 mL / NET: -411 mL    22 Aug 2023 07:01  -  22 Aug 2023 22:48  --------------------------------------------------------  IN: 536.2 mL / OUT: 0 mL / NET: 536.2 mL        PHYSICAL EXAM:  GENERAL: NAD, well-developed  HEAD:  Atraumatic, Normocephalic  EYES: EOMI, PERRLA, conjunctiva and sclera clear  NECK: Supple, No JVD  CHEST/LUNG: Clear to auscultation bilaterally; No wheeze  HEART: Regular rate and rhythm; No murmurs, rubs, or gallops  ABDOMEN: Soft, Nontender, Nondistended; Bowel sounds present  EXTREMITIES:  2+ Peripheral Pulses, No clubbing, cyanosis, or edema  PSYCH: AAOx3  NEUROLOGY: non-focal  SKIN: No rashes or lesions    LABS:                        8.0    18.73 )-----------( 302      ( 22 Aug 2023 00:35 )             25.4     08-22    135  |  98  |  47<H>  ----------------------------<  292<H>  3.7   |  25  |  1.93<H>    Ca    8.5      22 Aug 2023 00:35  Phos  3.7     08-22  Mg     2.30     08-22    TPro  6.3  /  Alb  2.8<L>  /  TBili  0.2  /  DBili  x   /  AST  43<H>  /  ALT  30  /  AlkPhos  210<H>  08-22    PT/INR - ( 22 Aug 2023 00:35 )   PT: 11.5 sec;   INR: 1.03 ratio         PTT - ( 22 Aug 2023 00:35 )  PTT:29.7 sec      Urinalysis Basic - ( 22 Aug 2023 00:35 )    Color: x / Appearance: x / SG: x / pH: x  Gluc: 292 mg/dL / Ketone: x  / Bili: x / Urobili: x   Blood: x / Protein: x / Nitrite: x   Leuk Esterase: x / RBC: x / WBC x   Sq Epi: x / Non Sq Epi: x / Bacteria: x        RADIOLOGY & ADDITIONAL TESTS:    Imaging Personally Reviewed:    Consultant(s) Notes Reviewed:      Care Discussed with Consultants/Other Providers:

## 2023-08-22 NOTE — CHART NOTE - NSCHARTNOTEFT_GEN_A_CORE
PRE-INTERVENTIONAL RADIOLOGY PROCEDURE NOTE      Patient Age: 74 YO     Patient Gender: F    Procedure: Renal biopsy    Diagnosis/Indication: RUSS 2/2 ?AIN vs. ATN    Interventional Radiology Attending Physician: Dr. Golden Chavira    Ordering Attending Physician: Dr. Mariah Liu    Pertinent Medical History: DM, CKD, HFpEF, h/o CVA and trach and PEG (now removed)    Pertinent labs:                      8.0    18.73 )-----------( 302      ( 22 Aug 2023 00:35 )             25.4       08-22    135  |  98  |  47<H>  ----------------------------<  292<H>  3.7   |  25  |  1.93<H>    Ca    8.5      22 Aug 2023 00:35  Phos  3.7     08-22  Mg     2.30     08-22    TPro  6.3  /  Alb  2.8<L>  /  TBili  0.2  /  DBili  x   /  AST  43<H>  /  ALT  30  /  AlkPhos  210<H>  08-22      PT/INR - ( 22 Aug 2023 00:35 )   PT: 11.5 sec;   INR: 1.03 ratio         PTT - ( 22 Aug 2023 00:35 )  PTT:29.7 sec        Patient and Family Aware ? Yes

## 2023-08-23 NOTE — PROGRESS NOTE ADULT - ATTENDING COMMENTS
Pt is a 73F with PMHx IDDMII, CKD, hx CVA, CHFpEF, latent TB (s/p tx,) hx breast cancer s/p lumpectomy, and hx CVA s/p trach/PEG (now decannulated) initially presenting to [facility] on 8/11/23 with acute hypoxemic and hypercapnic respiratory failure s/p ETT intubation/MV and ARF with pulmonary edema c/b HTN emergency requiring nicardipine drip transferred to MICU for further management.     Pt initially p/w worsening RUE shaking and dysconjugate gaze with MRI 8/17 (+) new right cerebellar, midbrain, and multiple tiny embolic infarcts as well as known left PCA CVA. EEG (-). STEPHANIE (-). ILR in place from prior CVA evaluation. At baseline, pt reportedly wheelchair bound with RUE tremors and some ADL assistance.     Pt with acute hypoxemic and hypercapnic respiratory failure s/p ETT intubation/MV possibly 2/2 flash pulmonary edema in the setting of HTN emergency +/- aspiration event. Now improved, pt on minimal ventilator settings. Plan to wean sedation and initaite PSV trials as tolerated today. Of note, pt with evidence of midbrain CVA with possible concern for central effect on respiratory drive, will monitor carefully.      Pt p/w ARF on CKD requiring initiation of HD 2/2 pulmonary edema and metabolic acidosis with metabolic encephalopathy. Last HD 8/21. No urgent indication for HD now, will hold off for now with daily clinical re-evaluation. Strict I/Os. Given concern for AIN renal bx recommended. After much discussion with consulting teams, it was agreed to perform renal biopsy. However, family ultimately refused 2/2 concern for risks of procedure. Procedure deferred for now. Will c/w steroids at current dosing.      Pt remains critically ill requiring ICU level of care. Pt full code. DVT ppx held 2/2 possible procedure. Discussed with pt's family at bedside.

## 2023-08-23 NOTE — PROGRESS NOTE ADULT - SUBJECTIVE AND OBJECTIVE BOX
*******************************  Martha Douglas PGY-2  *******************************    INTERVAL HPI/OVERNIGHT EVENTS:    SUBJECTIVE: Patient seen and examined at bedside.     OBJECTIVE:    VITAL SIGNS:  ICU Vital Signs Last 24 Hrs  T(C): 36.6 (23 Aug 2023 04:00), Max: 37.3 (22 Aug 2023 20:00)  T(F): 97.9 (23 Aug 2023 04:00), Max: 99.2 (22 Aug 2023 20:00)  HR: 61 (23 Aug 2023 06:23) (58 - 74)  BP: --  BP(mean): --  ABP: 114/28 (23 Aug 2023 06:23) (114/28 - 157/40)  ABP(mean): 57 (23 Aug 2023 06:23) (57 - 83)  RR: 12 (23 Aug 2023 06:23) (12 - 24)  SpO2: 100% (23 Aug 2023 06:23) (98% - 100%)    O2 Parameters below as of 23 Aug 2023 06:00  Patient On (Oxygen Delivery Method): ventilator    O2 Concentration (%): 30      Mode: AC/ CMV (Assist Control/ Continuous Mandatory Ventilation), RR (machine): 12, TV (machine): 360, FiO2: 30, PEEP: 5, ITime: 0.9, MAP: 10, PIP: 32    08-22 @ 07:01  -  08-23 @ 07:00  --------------------------------------------------------  IN: 1153.7 mL / OUT: 0 mL / NET: 1153.7 mL      CAPILLARY BLOOD GLUCOSE      POCT Blood Glucose.: 176 mg/dL (23 Aug 2023 05:08)        PHYSICAL EXAM:  GENERAL: NAD, well-developed  HEAD:  Atraumatic, Normocephalic  EYES: EOMI, PERRLA, conjunctiva and sclera clear  NECK: Supple, No JVD  CHEST/LUNG: Clear to auscultation bilaterally; No wheeze  HEART: Regular rate and rhythm; No murmurs, rubs, or gallops  ABDOMEN: Soft, Nontender, Nondistended; Bowel sounds present  EXTREMITIES:  2+ Peripheral Pulses, No clubbing, cyanosis, or edema  PSYCH: AAOx3  NEUROLOGY: non-focal  SKIN: No rashes or lesions  Lines:      MEDICATIONS:  MEDICATIONS  (STANDING):  albuterol/ipratropium for Nebulization 3 milliLiter(s) Nebulizer every 8 hours  atorvastatin 40 milliGRAM(s) Oral at bedtime  chlorhexidine 0.12% Liquid 15 milliLiter(s) Oral Mucosa every 12 hours  chlorhexidine 2% Cloths 1 Application(s) Topical daily  dexMEDEtomidine Infusion 0.1 MICROgram(s)/kG/Hr (1.66 mL/Hr) IV Continuous <Continuous>  dextrose 5%. 1000 milliLiter(s) (50 mL/Hr) IV Continuous <Continuous>  dextrose 5%. 1000 milliLiter(s) (100 mL/Hr) IV Continuous <Continuous>  dextrose 50% Injectable 25 Gram(s) IV Push once  dextrose 50% Injectable 25 Gram(s) IV Push once  dextrose 50% Injectable 12.5 Gram(s) IV Push once  glucagon  Injectable 1 milliGRAM(s) IntraMuscular once  insulin lispro (ADMELOG) corrective regimen sliding scale   SubCutaneous every 6 hours  insulin NPH human recombinant 18 Unit(s) SubCutaneous every 6 hours  labetalol 500 milliGRAM(s) Oral three times a day  niCARdipine Infusion 5 mG/Hr (25 mL/Hr) IV Continuous <Continuous>  predniSONE   Tablet 40 milliGRAM(s) Oral daily    MEDICATIONS  (PRN):  acetaminophen   Oral Liquid .. 650 milliGRAM(s) Oral every 6 hours PRN Temp greater or equal to 38C (100.4F), Mild Pain (1 - 3)  dextrose Oral Gel 15 Gram(s) Oral once PRN Blood Glucose LESS THAN 70 milliGRAM(s)/deciliter      ALLERGIES:  Allergies    isoniazid (Rash)  nafcillin (Unknown)  hydrALAZINE (Rash)  vitamin E (Short breath; Urticaria; Hives)  doxycycline (Rash)  cefepime (Rash)  NIFEdipine (Urticaria; Hives)    Intolerances        LABS:                        7.7    16.96 )-----------( 314      ( 23 Aug 2023 00:05 )             24.0     08-23    133<L>  |  99  |  65<H>  ----------------------------<  232<H>  4.1   |  25  |  2.34<H>    Ca    8.3<L>      23 Aug 2023 00:05  Phos  5.9     08-23  Mg     2.40     08-23    TPro  6.1  /  Alb  2.7<L>  /  TBili  0.3  /  DBili  x   /  AST  36<H>  /  ALT  29  /  AlkPhos  159<H>  08-23    PT/INR - ( 23 Aug 2023 00:05 )   PT: 11.8 sec;   INR: 1.05 ratio         PTT - ( 23 Aug 2023 00:05 )  PTT:24.9 sec  Urinalysis Basic - ( 23 Aug 2023 00:05 )    Color: x / Appearance: x / SG: x / pH: x  Gluc: 232 mg/dL / Ketone: x  / Bili: x / Urobili: x   Blood: x / Protein: x / Nitrite: x   Leuk Esterase: x / RBC: x / WBC x   Sq Epi: x / Non Sq Epi: x / Bacteria: x        RADIOLOGY & ADDITIONAL TESTS: Reviewed.     *******************************  Martha Douglas PGY-2  *******************************    INTERVAL HPI/OVERNIGHT EVENTS:    SUBJECTIVE: Patient seen and examined at bedside. Improved mentation per family, arousable, able to engage in nonverbal communication. Bx canceled 8/22 due to IR/anesthesia concerned about cardiopulmonary risk in prone position. Remains on cardene, unable to increase Labetalol given borderline bradycardia.     OBJECTIVE:    VITAL SIGNS:  ICU Vital Signs Last 24 Hrs  T(C): 36.6 (23 Aug 2023 04:00), Max: 37.3 (22 Aug 2023 20:00)  T(F): 97.9 (23 Aug 2023 04:00), Max: 99.2 (22 Aug 2023 20:00)  HR: 61 (23 Aug 2023 06:23) (58 - 74)  BP: --  BP(mean): --  ABP: 114/28 (23 Aug 2023 06:23) (114/28 - 157/40)  ABP(mean): 57 (23 Aug 2023 06:23) (57 - 83)  RR: 12 (23 Aug 2023 06:23) (12 - 24)  SpO2: 100% (23 Aug 2023 06:23) (98% - 100%)    O2 Parameters below as of 23 Aug 2023 06:00  Patient On (Oxygen Delivery Method): ventilator    O2 Concentration (%): 30      Mode: AC/ CMV (Assist Control/ Continuous Mandatory Ventilation), RR (machine): 12, TV (machine): 360, FiO2: 30, PEEP: 5, ITime: 0.9, MAP: 10, PIP: 32    08-22 @ 07:01  -  08-23 @ 07:00  --------------------------------------------------------  IN: 1153.7 mL / OUT: 0 mL / NET: 1153.7 mL      CAPILLARY BLOOD GLUCOSE      POCT Blood Glucose.: 176 mg/dL (23 Aug 2023 05:08)        PHYSICAL EXAM:  GENERAL: NAD, well-developed  HEAD:  Atraumatic, Normocephalic  EYES: EOMI, PERRLA, conjunctiva and sclera clear  NECK: Supple, No JVD  CHEST/LUNG: Clear to auscultation bilaterally; No wheeze  HEART: Regular rate and rhythm; No murmurs, rubs, or gallops  ABDOMEN: Soft, Nontender, Nondistended; Bowel sounds present  EXTREMITIES:  2+ Peripheral Pulses, No clubbing, cyanosis, or edema  PSYCH: AAOx3  NEUROLOGY: non-focal  SKIN: No rashes or lesions  Lines:      MEDICATIONS:  MEDICATIONS  (STANDING):  albuterol/ipratropium for Nebulization 3 milliLiter(s) Nebulizer every 8 hours  atorvastatin 40 milliGRAM(s) Oral at bedtime  chlorhexidine 0.12% Liquid 15 milliLiter(s) Oral Mucosa every 12 hours  chlorhexidine 2% Cloths 1 Application(s) Topical daily  dexMEDEtomidine Infusion 0.1 MICROgram(s)/kG/Hr (1.66 mL/Hr) IV Continuous <Continuous>  dextrose 5%. 1000 milliLiter(s) (50 mL/Hr) IV Continuous <Continuous>  dextrose 5%. 1000 milliLiter(s) (100 mL/Hr) IV Continuous <Continuous>  dextrose 50% Injectable 25 Gram(s) IV Push once  dextrose 50% Injectable 25 Gram(s) IV Push once  dextrose 50% Injectable 12.5 Gram(s) IV Push once  glucagon  Injectable 1 milliGRAM(s) IntraMuscular once  insulin lispro (ADMELOG) corrective regimen sliding scale   SubCutaneous every 6 hours  insulin NPH human recombinant 18 Unit(s) SubCutaneous every 6 hours  labetalol 500 milliGRAM(s) Oral three times a day  niCARdipine Infusion 5 mG/Hr (25 mL/Hr) IV Continuous <Continuous>  predniSONE   Tablet 40 milliGRAM(s) Oral daily    MEDICATIONS  (PRN):  acetaminophen   Oral Liquid .. 650 milliGRAM(s) Oral every 6 hours PRN Temp greater or equal to 38C (100.4F), Mild Pain (1 - 3)  dextrose Oral Gel 15 Gram(s) Oral once PRN Blood Glucose LESS THAN 70 milliGRAM(s)/deciliter      ALLERGIES:  Allergies    isoniazid (Rash)  nafcillin (Unknown)  hydrALAZINE (Rash)  vitamin E (Short breath; Urticaria; Hives)  doxycycline (Rash)  cefepime (Rash)  NIFEdipine (Urticaria; Hives)    Intolerances        LABS:                        7.7    16.96 )-----------( 314      ( 23 Aug 2023 00:05 )             24.0     08-23    133<L>  |  99  |  65<H>  ----------------------------<  232<H>  4.1   |  25  |  2.34<H>    Ca    8.3<L>      23 Aug 2023 00:05  Phos  5.9     08-23  Mg     2.40     08-23    TPro  6.1  /  Alb  2.7<L>  /  TBili  0.3  /  DBili  x   /  AST  36<H>  /  ALT  29  /  AlkPhos  159<H>  08-23    PT/INR - ( 23 Aug 2023 00:05 )   PT: 11.8 sec;   INR: 1.05 ratio         PTT - ( 23 Aug 2023 00:05 )  PTT:24.9 sec  Urinalysis Basic - ( 23 Aug 2023 00:05 )    Color: x / Appearance: x / SG: x / pH: x  Gluc: 232 mg/dL / Ketone: x  / Bili: x / Urobili: x   Blood: x / Protein: x / Nitrite: x   Leuk Esterase: x / RBC: x / WBC x   Sq Epi: x / Non Sq Epi: x / Bacteria: x        RADIOLOGY & ADDITIONAL TESTS: Reviewed.     *******************************  Martha Douglas PGY-2  *******************************    INTERVAL HPI/OVERNIGHT EVENTS:    SUBJECTIVE: Patient seen and examined at bedside. Improved mentation per family, arousable, able to engage in nonverbal communication. Bx canceled 8/22 due to IR/anesthesia concerned about cardiopulmonary risk in prone position. Remains on cardene, unable to increase Labetalol given borderline bradycardia.     OBJECTIVE:    VITAL SIGNS:  ICU Vital Signs Last 24 Hrs  T(C): 36.6 (23 Aug 2023 04:00), Max: 37.3 (22 Aug 2023 20:00)  T(F): 97.9 (23 Aug 2023 04:00), Max: 99.2 (22 Aug 2023 20:00)  HR: 61 (23 Aug 2023 06:23) (58 - 74)  BP: --  BP(mean): --  ABP: 114/28 (23 Aug 2023 06:23) (114/28 - 157/40)  ABP(mean): 57 (23 Aug 2023 06:23) (57 - 83)  RR: 12 (23 Aug 2023 06:23) (12 - 24)  SpO2: 100% (23 Aug 2023 06:23) (98% - 100%)    O2 Parameters below as of 23 Aug 2023 06:00  Patient On (Oxygen Delivery Method): ventilator    O2 Concentration (%): 30      Mode: AC/ CMV (Assist Control/ Continuous Mandatory Ventilation), RR (machine): 12, TV (machine): 360, FiO2: 30, PEEP: 5, ITime: 0.9, MAP: 10, PIP: 32    08-22 @ 07:01  -  08-23 @ 07:00  --------------------------------------------------------  IN: 1153.7 mL / OUT: 0 mL / NET: 1153.7 mL      CAPILLARY BLOOD GLUCOSE      POCT Blood Glucose.: 176 mg/dL (23 Aug 2023 05:08)        PHYSICAL EXAM:  GENERAL: NAD, well-developed  HEAD:  Atraumatic, Normocephalic  EYES: EOMI, PERRLA, conjunctiva and sclera clear  NECK: Supple, No JVD  CHEST/LUNG: Clear to auscultation bilaterally; No wheeze  HEART: Regular rate and rhythm; No murmurs, rubs, or gallops  ABDOMEN: Soft, Nontender, Nondistended; Bowel sounds present  EXTREMITIES:  2+ Peripheral Pulses, No clubbing or cyanosis. 1+ pitting edema  PSYCH: AAOx3  NEUROLOGY: non-focal  SKIN: No rashes or lesions  Lines:      MEDICATIONS:  MEDICATIONS  (STANDING):  albuterol/ipratropium for Nebulization 3 milliLiter(s) Nebulizer every 8 hours  atorvastatin 40 milliGRAM(s) Oral at bedtime  chlorhexidine 0.12% Liquid 15 milliLiter(s) Oral Mucosa every 12 hours  chlorhexidine 2% Cloths 1 Application(s) Topical daily  dexMEDEtomidine Infusion 0.1 MICROgram(s)/kG/Hr (1.66 mL/Hr) IV Continuous <Continuous>  dextrose 5%. 1000 milliLiter(s) (50 mL/Hr) IV Continuous <Continuous>  dextrose 5%. 1000 milliLiter(s) (100 mL/Hr) IV Continuous <Continuous>  dextrose 50% Injectable 25 Gram(s) IV Push once  dextrose 50% Injectable 25 Gram(s) IV Push once  dextrose 50% Injectable 12.5 Gram(s) IV Push once  glucagon  Injectable 1 milliGRAM(s) IntraMuscular once  insulin lispro (ADMELOG) corrective regimen sliding scale   SubCutaneous every 6 hours  insulin NPH human recombinant 18 Unit(s) SubCutaneous every 6 hours  labetalol 500 milliGRAM(s) Oral three times a day  niCARdipine Infusion 5 mG/Hr (25 mL/Hr) IV Continuous <Continuous>  predniSONE   Tablet 40 milliGRAM(s) Oral daily    MEDICATIONS  (PRN):  acetaminophen   Oral Liquid .. 650 milliGRAM(s) Oral every 6 hours PRN Temp greater or equal to 38C (100.4F), Mild Pain (1 - 3)  dextrose Oral Gel 15 Gram(s) Oral once PRN Blood Glucose LESS THAN 70 milliGRAM(s)/deciliter      ALLERGIES:  Allergies    isoniazid (Rash)  nafcillin (Unknown)  hydrALAZINE (Rash)  vitamin E (Short breath; Urticaria; Hives)  doxycycline (Rash)  cefepime (Rash)  NIFEdipine (Urticaria; Hives)    Intolerances        LABS:                        7.7    16.96 )-----------( 314      ( 23 Aug 2023 00:05 )             24.0     08-23    133<L>  |  99  |  65<H>  ----------------------------<  232<H>  4.1   |  25  |  2.34<H>    Ca    8.3<L>      23 Aug 2023 00:05  Phos  5.9     08-23  Mg     2.40     08-23    TPro  6.1  /  Alb  2.7<L>  /  TBili  0.3  /  DBili  x   /  AST  36<H>  /  ALT  29  /  AlkPhos  159<H>  08-23    PT/INR - ( 23 Aug 2023 00:05 )   PT: 11.8 sec;   INR: 1.05 ratio         PTT - ( 23 Aug 2023 00:05 )  PTT:24.9 sec  Urinalysis Basic - ( 23 Aug 2023 00:05 )    Color: x / Appearance: x / SG: x / pH: x  Gluc: 232 mg/dL / Ketone: x  / Bili: x / Urobili: x   Blood: x / Protein: x / Nitrite: x   Leuk Esterase: x / RBC: x / WBC x   Sq Epi: x / Non Sq Epi: x / Bacteria: x        RADIOLOGY & ADDITIONAL TESTS: Reviewed.     *******************************  Martha Douglas PGY-2  *******************************    INTERVAL HPI/OVERNIGHT EVENTS:    SUBJECTIVE: Patient seen and examined at bedside. Improved mentation per family, arousable, able to engage in nonverbal communication. Bx deferred 8/22, requiring further discussion with IR and nephro. Remains on cardene, unable to increase Labetalol given borderline bradycardia.     OBJECTIVE:    VITAL SIGNS:  ICU Vital Signs Last 24 Hrs  T(C): 36.6 (23 Aug 2023 04:00), Max: 37.3 (22 Aug 2023 20:00)  T(F): 97.9 (23 Aug 2023 04:00), Max: 99.2 (22 Aug 2023 20:00)  HR: 61 (23 Aug 2023 06:23) (58 - 74)  BP: --  BP(mean): --  ABP: 114/28 (23 Aug 2023 06:23) (114/28 - 157/40)  ABP(mean): 57 (23 Aug 2023 06:23) (57 - 83)  RR: 12 (23 Aug 2023 06:23) (12 - 24)  SpO2: 100% (23 Aug 2023 06:23) (98% - 100%)    O2 Parameters below as of 23 Aug 2023 06:00  Patient On (Oxygen Delivery Method): ventilator    O2 Concentration (%): 30      Mode: AC/ CMV (Assist Control/ Continuous Mandatory Ventilation), RR (machine): 12, TV (machine): 360, FiO2: 30, PEEP: 5, ITime: 0.9, MAP: 10, PIP: 32    08-22 @ 07:01  -  08-23 @ 07:00  --------------------------------------------------------  IN: 1153.7 mL / OUT: 0 mL / NET: 1153.7 mL      CAPILLARY BLOOD GLUCOSE      POCT Blood Glucose.: 176 mg/dL (23 Aug 2023 05:08)        PHYSICAL EXAM:  GENERAL: NAD, well-developed  HEAD:  Atraumatic, Normocephalic  EYES: EOMI, PERRLA, conjunctiva and sclera clear  NECK: Supple, No JVD  CHEST/LUNG: Clear to auscultation bilaterally; No wheeze  HEART: Regular rate and rhythm; No murmurs, rubs, or gallops  ABDOMEN: Soft, Nontender, Nondistended; Bowel sounds present  EXTREMITIES:  2+ Peripheral Pulses, No clubbing or cyanosis. 1+ pitting edema  PSYCH: AAOx3  NEUROLOGY: non-focal  SKIN: No rashes or lesions  Lines:      MEDICATIONS:  MEDICATIONS  (STANDING):  albuterol/ipratropium for Nebulization 3 milliLiter(s) Nebulizer every 8 hours  atorvastatin 40 milliGRAM(s) Oral at bedtime  chlorhexidine 0.12% Liquid 15 milliLiter(s) Oral Mucosa every 12 hours  chlorhexidine 2% Cloths 1 Application(s) Topical daily  dexMEDEtomidine Infusion 0.1 MICROgram(s)/kG/Hr (1.66 mL/Hr) IV Continuous <Continuous>  dextrose 5%. 1000 milliLiter(s) (50 mL/Hr) IV Continuous <Continuous>  dextrose 5%. 1000 milliLiter(s) (100 mL/Hr) IV Continuous <Continuous>  dextrose 50% Injectable 25 Gram(s) IV Push once  dextrose 50% Injectable 25 Gram(s) IV Push once  dextrose 50% Injectable 12.5 Gram(s) IV Push once  glucagon  Injectable 1 milliGRAM(s) IntraMuscular once  insulin lispro (ADMELOG) corrective regimen sliding scale   SubCutaneous every 6 hours  insulin NPH human recombinant 18 Unit(s) SubCutaneous every 6 hours  labetalol 500 milliGRAM(s) Oral three times a day  niCARdipine Infusion 5 mG/Hr (25 mL/Hr) IV Continuous <Continuous>  predniSONE   Tablet 40 milliGRAM(s) Oral daily    MEDICATIONS  (PRN):  acetaminophen   Oral Liquid .. 650 milliGRAM(s) Oral every 6 hours PRN Temp greater or equal to 38C (100.4F), Mild Pain (1 - 3)  dextrose Oral Gel 15 Gram(s) Oral once PRN Blood Glucose LESS THAN 70 milliGRAM(s)/deciliter      ALLERGIES:  Allergies    isoniazid (Rash)  nafcillin (Unknown)  hydrALAZINE (Rash)  vitamin E (Short breath; Urticaria; Hives)  doxycycline (Rash)  cefepime (Rash)  NIFEdipine (Urticaria; Hives)    Intolerances        LABS:                        7.7    16.96 )-----------( 314      ( 23 Aug 2023 00:05 )             24.0     08-23    133<L>  |  99  |  65<H>  ----------------------------<  232<H>  4.1   |  25  |  2.34<H>    Ca    8.3<L>      23 Aug 2023 00:05  Phos  5.9     08-23  Mg     2.40     08-23    TPro  6.1  /  Alb  2.7<L>  /  TBili  0.3  /  DBili  x   /  AST  36<H>  /  ALT  29  /  AlkPhos  159<H>  08-23    PT/INR - ( 23 Aug 2023 00:05 )   PT: 11.8 sec;   INR: 1.05 ratio         PTT - ( 23 Aug 2023 00:05 )  PTT:24.9 sec  Urinalysis Basic - ( 23 Aug 2023 00:05 )    Color: x / Appearance: x / SG: x / pH: x  Gluc: 232 mg/dL / Ketone: x  / Bili: x / Urobili: x   Blood: x / Protein: x / Nitrite: x   Leuk Esterase: x / RBC: x / WBC x   Sq Epi: x / Non Sq Epi: x / Bacteria: x        RADIOLOGY & ADDITIONAL TESTS: Reviewed.

## 2023-08-23 NOTE — PROGRESS NOTE ADULT - ASSESSMENT
74 y/o F DM on insulin, HTN, CKD, CHFpEF, breast CA, respiratory arrest and cardiac arrest (2018), CVA with residual weakness, aspiration PNA h/o trache, PEG, both removed presents with respiratory distress requiring intubation. Found to have elevated BNP, may be 2/2 to volume overload i/s/o CKD vs CHF.     Neuro ;  - Sedated : Baseline MS NELSONx3   -WAS ON PROPOFOL AND FENTANYL BEFORE: NOW OFF:    - Monitor her mental status:  requiring only 30% fio2:    -8/15: - now she has been off sedation for more then 24 hours but is still lethargic: her o2 requirement has not increased:   -for possible extubation if she wakes up   :: -dw PMD: pt is still lethargic  for MRI brain stem today   ": -had ct head: OK: awaiting MRI brain : still lethargic  : seemed same to me: family at bedside who says she is little  bit more awake: oxygen requirement is same:  at 30%:off vasopressors   -now neuro note reviewed:  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 reason for inability ot trigger vent and co2 retention initially ? candidate for trach    -now the co2 i s normal : not much improvement in her mental statis:  off sedation for days   /8/20: still lethargic: on precedex;  cxr reviewed:  last time at [facility] she was found to have cavity in RUL : she was ruled out for tb : her AFB was positive but was MAC /; defer to MICU   "  still sedated:  no sob:  on 30% fio2:  on precedex:  and is on nicardipine as well as labetalol ; for renal biopsy today  : seems OK: more alert and awake:  renal biopsy is still pending:  la nenaq family   CVA   - cont aspirin and statin   //-per neurology   : sedated  neuro followin/23:edation:  seems to be doing better:  plan for extubation if family refused for biopsy   Cardiac   -CHFpEF : TTE 2023 with EF 59%, with severe LVH and diastolic dysfunction : pro-BNP > 23171, trop 78   -on bumex drip :  -cards following   8/15: : she is off bumex drip and is on midodrine   : remains off bumex  :: off bumex:  defer to MICU team   cont to remain off diuretics   : she seems same to me: in renal failure off diuretics   : per Micu   Pulmonary   -Acute hypercapnic and hypoxemic respiratory failure   -DDx: aspiration vs volume overload  VBG with respiratory acidosis pCO2 111  - CXR with small right pleural effusion, no consolidations/opacities  -now she seems better:  fio2: 30$:  -? etiology of hypercarbia:  multifactorial : seems to have OHS and YTRONE superimposed by acute chf  ? and aspiration pneumonia:"   -pt is mostly bed bound:   -it is possible that this time:  she mght need bipap on dc : atleast at the night time:   -on 8/15:  still remains intubated  :still lethargic:  not extubated hypercarbic acidotic today on abg:  weaning trials :probably would need bipap following extubation  : :still lethargic: :awaiting mri brain : dw    : :MRI done has new right cerebellar infarct on mri : defer to MICU   -seen by bora now:  has brain stem infarct to per neuro note:  reason for being vent;    : awaiting renal bipsy today  : and then aggressive weaning  /Renal   -Hyperkalemia with EKG changes  : 7.2, hemolyzed, given insulin and D50 + calcium gluc   -K is better today : cont to monitor  -normal today on 8/15   8/16:stable  :-k has been ok for l ast few days  : stable  : started on prednisone yesterday for suspected AIN by renal    : wbc is high : likely secondary to steroids  she has no fveR:  ruled out for tb last time   : for renal biopsy today   : RENAL  BIOPSY NOT DONE:  FAMILY IS THINKING about itl   GI NPO for now  Endocrine  T2DM : A1C 7.1 in 2023   -cont to monitor blood glucose  ID   - No fever/leukocytosis  - Hx latent TB which was treated, no concern for TB  : She was recently ruled out for tuberculosis at [facility]   - Started on empiric vancomycin and aztreonam (cefepime/zosyn allergy? developed rash req prednisone)  : now dced:   defer to MICU   - f/u MRSA   - follow up blood culture/urine   :blood cultures negative so far: legionella is negative:  remains off antibiotics  staph aureus on sputum   :: off antibiotics at this time: secondary to drug eruption  : remains off antibiotics   -: off diuretics   : off antibiotics   : off antibiotics   : off antibtiocs    dw micu and  at bedside as wellas MICU attending   overall prognosis seems guarded

## 2023-08-23 NOTE — PROGRESS NOTE ADULT - SUBJECTIVE AND OBJECTIVE BOX
Remains intubated/sedated on Precedex  Remains on Nicardipine gtt      VITAL:  T(C): , Max: 37.3 (08-22-23 @ 20:00)  T(F): , Max: 99.2 (08-22-23 @ 20:00)  HR: 61 (08-23-23 @ 06:23)  BP: 114/28  RR: 12 (08-23-23 @ 06:23)  SpO2: 100% (08-23-23 @ 06:23)  Wt(kg): --      PHYSICAL EXAM:  Constitutional: intubated/sedated  HEENT: (+)ETT/OGT  Neck: Supple, No JVD  Respiratory: coarse BS b/l/decreased at bases  Cardiovascular: RRR s1s2, no m/r/g  Gastrointestinal: BS+, soft, NT/ND  Extremities: No peripheral edema b/l  Back: no CVAT b/l  Skin: No rashes, no nevi  Access: [facility]      LABS:                        7.7    16.96 )-----------( 314      ( 23 Aug 2023 00:05 )             24.0     Na(133)/K(4.1)/Cl(99)/HCO3(25)/BUN(65)/Cr(2.34)Glu(232)/Ca(8.3)/Mg(2.40)/PO4(5.9)    08-23 @ 00:05  Na(135)/K(3.7)/Cl(98)/HCO3(25)/BUN(47)/Cr(1.93)Glu(292)/Ca(8.5)/Mg(2.30)/PO4(3.7)    08-22 @ 00:35  Na(136)/K(3.7)/Cl(99)/HCO3(27)/BUN(38)/Cr(1.68)Glu(334)/Ca(8.6)/Mg(2.20)/PO4(3.9)    08-21 @ 16:52  Na(131)/K(4.4)/Cl(97)/HCO3(27)/BUN(63)/Cr(2.82)Glu(340)/Ca(9.1)/Mg(2.50)/PO4(3.9)    08-21 @ 00:30      IMPRESSION: 73F w/ HTN, DM2, CVA, breast CA-bilateral mastectomy, recurrent aspiration pneumonia/respiratory failure, and CKD, 8/11/23 p/w acute hypercapnic respiratory failure; c/b RUSS    (1)Renal - CKD4     (2)RUSS - most likely ischemic ATN; less likely but possibly AIN. S/p 2nd HD session Monday 8/21. Is he urinating? Renal biopsy cancelled yesterday by IR     (3)Hyperphosphatemia - worsening as of today    (4)Pulm- hypercapnic respiratory failure on admission - remains intubated    (5)CV - hypertensive; on Nicardipine gtt. Hypervolemia improving with HD    (6)ID - PNA - s/p IV abx        RECOMMEND:  (1)Prednisone as ordered for now  (2)IR guided biopsy today as planned  (3)No HD for today; will continue to assess need on a daily basis  (4)Dose new meds for GFR<10/HD              Jimmy Colon MD  [facility]  Office/on call physician: [phone]  Cell (7a-7p): [phone]       Remains intubated/sedated on Precedex  Remains on Nicardipine gtt      VITAL:  T(C): , Max: 37.3 (08-22-23 @ 20:00)  T(F): , Max: 99.2 (08-22-23 @ 20:00)  HR: 61 (08-23-23 @ 06:23)  BP: 114/28  RR: 12 (08-23-23 @ 06:23)  SpO2: 100% (08-23-23 @ 06:23)  Wt(kg): --      PHYSICAL EXAM:  Constitutional: intubated/sedated  HEENT: (+)ETT/OGT  Neck: Supple, No JVD  Respiratory: coarse BS b/l/decreased at bases  Cardiovascular: RRR s1s2, no m/r/g  Gastrointestinal: BS+, soft, NT/ND  Extremities: No peripheral edema b/l  Back: no CVAT b/l  Skin: No rashes, no nevi  Access: [facility]      LABS:                        7.7    16.96 )-----------( 314      ( 23 Aug 2023 00:05 )             24.0     Na(133)/K(4.1)/Cl(99)/HCO3(25)/BUN(65)/Cr(2.34)Glu(232)/Ca(8.3)/Mg(2.40)/PO4(5.9)    08-23 @ 00:05  Na(135)/K(3.7)/Cl(98)/HCO3(25)/BUN(47)/Cr(1.93)Glu(292)/Ca(8.5)/Mg(2.30)/PO4(3.7)    08-22 @ 00:35  Na(136)/K(3.7)/Cl(99)/HCO3(27)/BUN(38)/Cr(1.68)Glu(334)/Ca(8.6)/Mg(2.20)/PO4(3.9)    08-21 @ 16:52  Na(131)/K(4.4)/Cl(97)/HCO3(27)/BUN(63)/Cr(2.82)Glu(340)/Ca(9.1)/Mg(2.50)/PO4(3.9)    08-21 @ 00:30      IMPRESSION: 73F w/ HTN, DM2, CVA, breast CA-bilateral mastectomy, recurrent aspiration pneumonia/respiratory failure, and CKD, 8/11/23 p/w acute hypercapnic respiratory failure; c/b RUSS    (1)Renal - CKD4     (2)RUSS - most likely ischemic ATN; less likely but possibly AIN. S/p 2nd HD session Monday 8/21. Is he urinating? Renal biopsy cancelled yesterday by IR     (3)Hyperphosphatemia - worsening as of today    (4)Pulm- hypercapnic respiratory failure on admission - remains intubated    (5)CV - hypertensive; on Nicardipine gtt. Hypervolemia improving with HD    (6)ID - PNA - s/p IV abx        RECOMMEND:  (1)Prednisone as ordered for now  (2)No HD for today; will continue to assess need on a daily basis  (3)Strict I/Os if possible  (4)Dose new meds for GFR<10/HD              Jimmy Colon MD  [facility]  Office/on call physician: [phone]  Cell (7a-7p): [phone]       Remains intubated/sedated on Precedex  Remains on Nicardipine gtt      VITAL:  T(C): , Max: 37.3 (08-22-23 @ 20:00)  T(F): , Max: 99.2 (08-22-23 @ 20:00)  HR: 61 (08-23-23 @ 06:23)  BP: 114/28  RR: 12 (08-23-23 @ 06:23)  SpO2: 100% (08-23-23 @ 06:23)  Wt(kg): --      PHYSICAL EXAM:  Constitutional: intubated/sedated  HEENT: (+)ETT/OGT  Neck: Supple, No JVD  Respiratory: coarse BS b/l/decreased at bases  Cardiovascular: RRR s1s2, no m/r/g  Gastrointestinal: BS+, soft, NT/ND  Extremities: No peripheral edema b/l  Back: no CVAT b/l  Skin: No rashes, no nevi  Access: [facility]      LABS:                        7.7    16.96 )-----------( 314      ( 23 Aug 2023 00:05 )             24.0     Na(133)/K(4.1)/Cl(99)/HCO3(25)/BUN(65)/Cr(2.34)Glu(232)/Ca(8.3)/Mg(2.40)/PO4(5.9)    08-23 @ 00:05  Na(135)/K(3.7)/Cl(98)/HCO3(25)/BUN(47)/Cr(1.93)Glu(292)/Ca(8.5)/Mg(2.30)/PO4(3.7)    08-22 @ 00:35  Na(136)/K(3.7)/Cl(99)/HCO3(27)/BUN(38)/Cr(1.68)Glu(334)/Ca(8.6)/Mg(2.20)/PO4(3.9)    08-21 @ 16:52  Na(131)/K(4.4)/Cl(97)/HCO3(27)/BUN(63)/Cr(2.82)Glu(340)/Ca(9.1)/Mg(2.50)/PO4(3.9)    08-21 @ 00:30      IMPRESSION: 73F w/ HTN, DM2, CVA, breast CA-bilateral mastectomy, recurrent aspiration pneumonia/respiratory failure, and CKD, 8/11/23 p/w acute hypercapnic respiratory failure; c/b RUSS    (1)Renal - CKD4     (2)RUSS - most likely ischemic ATN; less likely but possibly AIN. S/p 2nd HD session Monday 8/21. Is he urinating? S/p renal biopsy, results pending    (3)Hyperphosphatemia - worsening as of today    (4)Pulm- hypercapnic respiratory failure on admission - remains intubated    (5)CV - hypertensive; on Nicardipine gtt. Hypervolemia improving with HD    (6)ID - PNA - s/p IV abx        RECOMMEND:  (1)Prednisone as ordered for now; f/u renal biopsy results  (2)No HD for today; will continue to assess need on a daily basis  (3)Strict I/Os if possible  (4)Dose new meds for GFR<10/HD              Jimmy Colon MD  [facility]  Office/on call physician: [phone]  Cell (7a-7p): [phone]       Remains intubated/sedated on Precedex  Remains on Nicardipine gtt      VITAL:  T(C): , Max: 37.3 (08-22-23 @ 20:00)  T(F): , Max: 99.2 (08-22-23 @ 20:00)  HR: 61 (08-23-23 @ 06:23)  BP: 114/28  RR: 12 (08-23-23 @ 06:23)  SpO2: 100% (08-23-23 @ 06:23)  Wt(kg): --      PHYSICAL EXAM:  Constitutional: intubated/sedated  HEENT: (+)ETT/OGT  Neck: Supple, No JVD  Respiratory: coarse BS b/l/decreased at bases  Cardiovascular: RRR s1s2, no m/r/g  Gastrointestinal: BS+, soft, NT/ND  Extremities: No peripheral edema b/l  Back: no CVAT b/l  Skin: No rashes, no nevi  Access: [facility]      LABS:                        7.7    16.96 )-----------( 314      ( 23 Aug 2023 00:05 )             24.0     Na(133)/K(4.1)/Cl(99)/HCO3(25)/BUN(65)/Cr(2.34)Glu(232)/Ca(8.3)/Mg(2.40)/PO4(5.9)    08-23 @ 00:05  Na(135)/K(3.7)/Cl(98)/HCO3(25)/BUN(47)/Cr(1.93)Glu(292)/Ca(8.5)/Mg(2.30)/PO4(3.7)    08-22 @ 00:35  Na(136)/K(3.7)/Cl(99)/HCO3(27)/BUN(38)/Cr(1.68)Glu(334)/Ca(8.6)/Mg(2.20)/PO4(3.9)    08-21 @ 16:52  Na(131)/K(4.4)/Cl(97)/HCO3(27)/BUN(63)/Cr(2.82)Glu(340)/Ca(9.1)/Mg(2.50)/PO4(3.9)    08-21 @ 00:30      IMPRESSION: 73F w/ HTN, DM2, CVA, breast CA-bilateral mastectomy, recurrent aspiration pneumonia/respiratory failure, and CKD, 8/11/23 p/w acute hypercapnic respiratory failure; c/b RUSS    (1)Renal - CKD4     (2)RUSS - most likely ischemic ATN; less likely but possibly AIN. S/p 2nd HD session Monday 8/21. Is he urinating? Renal biopsy cancelled yesterday by IR; will it be rescheduled?    (3)Hyperphosphatemia - worsening as of today    (4)Pulm- hypercapnic respiratory failure on admission - remains intubated    (5)CV - hypertensive; on Nicardipine gtt. Hypervolemia improving with HD    (6)ID - PNA - s/p IV abx        RECOMMEND:  (1)Prednisone as ordered for now  (2)Favor moving forward with renal biopsy  (3)No HD for today; will continue to assess need on a daily basis  (4)Strict I/Os if possible  (5)Dose new meds for GFR<10/HD    family counseled at bedside; give my impression of risks/benefits of renal biopsy, and explained to them why I favor moving forward          Jimmy Colon MD  [facility]  Office/on call physician: [phone]  Cell (7a-7p): [phone]

## 2023-08-23 NOTE — PROGRESS NOTE ADULT - SUBJECTIVE AND OBJECTIVE BOX
Date of Service: 08-23-23 @ 11:08    Patient is a 73y old  Female who presents with a chief complaint of Respiratory distress (23 Aug 2023 09:02)      Any change in ROS: intubated and not sedated: more alert and understands simple commands:     MEDICATIONS  (STANDING):  albuterol/ipratropium for Nebulization 3 milliLiter(s) Nebulizer every 8 hours  atorvastatin 40 milliGRAM(s) Oral at bedtime  chlorhexidine 0.12% Liquid 15 milliLiter(s) Oral Mucosa every 12 hours  chlorhexidine 2% Cloths 1 Application(s) Topical daily  dexMEDEtomidine Infusion 0.1 MICROgram(s)/kG/Hr (1.66 mL/Hr) IV Continuous <Continuous>  dextrose 5%. 1000 milliLiter(s) (50 mL/Hr) IV Continuous <Continuous>  dextrose 5%. 1000 milliLiter(s) (100 mL/Hr) IV Continuous <Continuous>  dextrose 50% Injectable 12.5 Gram(s) IV Push once  dextrose 50% Injectable 25 Gram(s) IV Push once  dextrose 50% Injectable 25 Gram(s) IV Push once  glucagon  Injectable 1 milliGRAM(s) IntraMuscular once  insulin lispro (ADMELOG) corrective regimen sliding scale   SubCutaneous every 6 hours  insulin NPH human recombinant 18 Unit(s) SubCutaneous every 6 hours  labetalol 500 milliGRAM(s) Oral three times a day  niCARdipine Infusion 5 mG/Hr (25 mL/Hr) IV Continuous <Continuous>  predniSONE   Tablet 40 milliGRAM(s) Oral daily    MEDICATIONS  (PRN):  acetaminophen   Oral Liquid .. 650 milliGRAM(s) Oral every 6 hours PRN Temp greater or equal to 38C (100.4F), Mild Pain (1 - 3)  dextrose Oral Gel 15 Gram(s) Oral once PRN Blood Glucose LESS THAN 70 milliGRAM(s)/deciliter    Vital Signs Last 24 Hrs  T(C): 36.2 (23 Aug 2023 08:00), Max: 37.3 (22 Aug 2023 20:00)  T(F): 97.2 (23 Aug 2023 08:00), Max: 99.2 (22 Aug 2023 20:00)  HR: 65 (23 Aug 2023 10:00) (58 - 74)  BP: --  BP(mean): --  RR: 16 (23 Aug 2023 10:00) (12 - 24)  SpO2: 100% (23 Aug 2023 10:00) (98% - 100%)    Parameters below as of 23 Aug 2023 10:00  Patient On (Oxygen Delivery Method): ventilator    O2 Concentration (%): 30  Mode: AC/ CMV (Assist Control/ Continuous Mandatory Ventilation)  RR (machine): 12  TV (machine): 360  FiO2: 30  PEEP: 5  ITime: 0.9  MAP: 10  PIP: 31    I&O's Summary    22 Aug 2023 07:01  -  23 Aug 2023 07:00  --------------------------------------------------------  IN: 1153.7 mL / OUT: 0 mL / NET: 1153.7 mL    23 Aug 2023 07:01  -  23 Aug 2023 11:08  --------------------------------------------------------  IN: 150 mL / OUT: 300 mL / NET: -150 mL          Physical Exam:   GENERAL: NAD, well-groomed, well-developed  HEENT: MANDY/   Atraumatic, Normocephalic  ENMT: No tonsillar erythema, exudates, or enlargement; Moist mucous membranes, Good dentition, No lesions  NECK: Supple, No JVD, Normal thyroid  CHEST/LUNG: Clear to auscultaion  CVS: Regular rate and rhythm; No murmurs, rubs, or gallops  GI: : Soft, Nontender, Nondistended; Bowel sounds present  NERVOUS SYSTEM:  awake and responsive  EXTREMITIES:mid edema  LYMPH: No lymphadenopathy noted  SKIN: No rashes or lesions  ENDOCRINOLOGY: No Thyromegaly  PSYCH: Appropriate    Labs:  ABG - ( 23 Aug 2023 00:05 )  pH, Arterial: 7.35  pH, Blood: x     /  pCO2: 46    /  pO2: 91    / HCO3: 25    / Base Excess: -0.3  /  SaO2: 98.6                                        7.7    16.96 )-----------( 314      ( 23 Aug 2023 00:05 )             24.0                         8.0    18.73 )-----------( 302      ( 22 Aug 2023 00:35 )             25.4                         8.1    18.57 )-----------( 310      ( 21 Aug 2023 16:52 )             26.1                         7.7    16.20 )-----------( 301      ( 21 Aug 2023 06:50 )             24.5                         6.6    14.78 )-----------( 287      ( 21 Aug 2023 00:30 )             21.2                         6.5    14.89 )-----------( 302      ( 21 Aug 2023 00:15 )             21.0                         7.4    16.97 )-----------( 302      ( 20 Aug 2023 00:41 )             23.0     08-23    133<L>  |  99  |  65<H>  ----------------------------<  232<H>  4.1   |  25  |  2.34<H>  08-22    135  |  98  |  47<H>  ----------------------------<  292<H>  3.7   |  25  |  1.93<H>  08-21    136  |  99  |  38<H>  ----------------------------<  334<H>  3.7   |  27  |  1.68<H>  08-21    131<L>  |  97<L>  |  63<H>  ----------------------------<  340<H>  4.4   |  27  |  2.82<H>  08-20    139  |  101  |  47<H>  ----------------------------<  213<H>  3.1<L>   |  28  |  2.42<H>    Ca    8.3<L>      23 Aug 2023 00:05  Ca    8.5      22 Aug 2023 00:35  Ca    8.6      21 Aug 2023 16:52  Phos  5.9     08-23  Phos  3.7     08-22  Phos  3.9     08-21  Mg     2.40     08-23  Mg     2.30     08-22  Mg     2.20     08-21    TPro  6.1  /  Alb  2.7<L>  /  TBili  0.3  /  DBili  x   /  AST  36<H>  /  ALT  29  /  AlkPhos  159<H>  08-23  TPro  6.3  /  Alb  2.8<L>  /  TBili  0.2  /  DBili  x   /  AST  43<H>  /  ALT  30  /  AlkPhos  210<H>  08-22  TPro  5.9<L>  /  Alb  2.6<L>  /  TBili  0.2  /  DBili  x   /  AST  70<H>  /  ALT  31  /  AlkPhos  203<H>  08-21  TPro  6.5  /  Alb  2.7<L>  /  TBili  0.2  /  DBili  x   /  AST  26  /  ALT  5   /  AlkPhos  226<H>  08-20    CAPILLARY BLOOD GLUCOSE      POCT Blood Glucose.: 176 mg/dL (23 Aug 2023 05:08)  POCT Blood Glucose.: 265 mg/dL (22 Aug 2023 17:53)  POCT Blood Glucose.: 229 mg/dL (22 Aug 2023 12:11)      LIVER FUNCTIONS - ( 23 Aug 2023 00:05 )  Alb: 2.7 g/dL / Pro: 6.1 g/dL / ALK PHOS: 159 U/L / ALT: 29 U/L / AST: 36 U/L / GGT: x           PT/INR - ( 23 Aug 2023 00:05 )   PT: 11.8 sec;   INR: 1.05 ratio         PTT - ( 23 Aug 2023 00:05 )  PTT:24.9 sec  Urinalysis Basic - ( 23 Aug 2023 00:05 )    Color: x / Appearance: x / SG: x / pH: x  Gluc: 232 mg/dL / Ketone: x  / Bili: x / Urobili: x   Blood: x / Protein: x / Nitrite: x   Leuk Esterase: x / RBC: x / WBC x   Sq Epi: x / Non Sq Epi: x / Bacteria: x            RECENT CULTURES:  08-16 @ 20:46 .Blood Blood-Peripheral                No growth at 5 days    08-16 @ 20:13 .Blood Blood-Peripheral         rad< from: Xray Chest 1 View- PORTABLE-Urgent (Xray Chest 1 View- PORTABLE-Urgent .) (08.19.23 @ 18:37) >    ACC: [id number] EXAM:  XR CHEST PORTABLE URGENT 1V   ORDERED BY: JAMIL SANCHEZ     PROCEDURE DATE:  08/19/2023          INTERPRETATION:  EXAMINATION: XR CHEST URGENT    CLINICAL INDICATION: Line Placement    TECHNIQUE: Single frontal, portable view of the chest was obtained.    COMPARISON: Chest x-ray 8/13/2023.    FINDINGS:  Right IJ Shiley in place with catheter tip at the level of the lower SVC.  The heart is normal in size. Cardiac loop recorder in place.  Left jugular central line reaches the upper SVC.  Bilateral pulmonary edema improved from prior study. Right apical pleural   thickening unchanged from prior study.  There is no pneumothorax or pleural effusion.  No acute bony abnormality.    IMPRESSION:  Right IJ Shiley in place with catheter tip at the level of lower SVC.    --- End of Report ---          KEELY JOSE MD; Resident Radiologist  This document has been electronically signed.  KHALIDA JENSEN MD; Attending Interventional Radiologist  This document has been electronically signed. Aug 22 2023 12:29PM    < end of copied text >  < from: CT Head No Cont (08.15.23 @ 17:20) >    ACC: [id number] EXAM:  CT BRAIN   ORDERED BY: MINAL SAM     PROCEDURE DATE:  08/15/2023          INTERPRETATION:  Clinical indication: Change in neuro exam.    Multiple axial sections were performed from base to vertex without   contrast enhancement. Coronal and sagittal reconstructions were   reformatted well.    This exam is compared prior head CT performed on August 11, 2023    Parenchymal volume loss and chronic microvessel ischemic changes are   again seen.    Abnormal low-attenuation involving the left occipital cortical   subcortical region is again seen. 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    No evidence of acute hemorrhage mass or mass effect is seen.    Evaluation of the osseous structures with appropriate window demonstrates   sclerotic changes about the left mastoid region which appears stable.   Opacification left middle ear region is again seen.    Patient is status post bilateral cataract surgery.    Impression: Stable exam.    --- End of Report ---            DERIC FLOR MD; Attending Radiologist  This document has been electronically signed. Aug 15 2023  5:21PM    < end of copied text >         No growth at 5 days    08-16 @ 15:00 .Sputum Sputum       Few polymorphonuclear leukocytes per low power field  Rare Squamous epithelial cells per low power field  Few Gram Positive Cocci in Clusters per oil power field           Normal Respiratory Cate present          RESPIRATORY CULTURES:          Studies  Chest X-RAY  CT SCAN Chest   Venous Dopplers: LE:   CT Abdomen  Others

## 2023-08-23 NOTE — PROGRESS NOTE ADULT - ASSESSMENT
74 y/o F DM on insulin, HTN, CKD, CHFpEF, breast CA, respiratory arrest and cardiac arrest (2018), CVA with residual weakness, aspiration PNA h/o trache, PEG, both removed presents with respiratory distress requiring intubation. May be volume overload i/s/o CHF/CKD vs decrease respiratory drive (given oxygen at home). Improving mental status however worsening renal function and anuria, concerning for ATN vs AIN.     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 No Hx Afib or documented   STEPHANIE with no TRINITY thrombus or intracardiac shunts   - avoid hypertension with BP < 180 systolic   - holding ASA for possible kidney biopsy planned for 8/22   - c/w statin   - 8/22: wean sedation as tolerated    Cardiac   #Hypertension  - To increase labetalol to 500  - 8/22: D/c Nicardipine    #Shock  #Hypertension   - Initially vasoplegic from prop  - weaned vaso and levo, now hypertensive likely from fluid overload with renal dysfunction   > changed coreg to labetalol and uptitrated to 400 TID for BP control given CVA   > on nitro drip     #CHFpEF   TTE 7/2023 with EF 59%, with severe LVH and diastolic dysfunction   pro-BNP > 45348, trop 78   Repeat TTE with EF 60% normal systolic and grade 1 diastolic dysfunction     Pulmonary   #Acute hypercapnic and hypoxemic respiratory failure   DDx: aspiration vs volume overload vs sedating medication decreasing drive (was given nyquil)   VBG with respiratory acidosis pCO2 111  CXR with small right pleural effusion, no consolidations/opacities  > c/w mechanical ventilation   > PS trials, plan for possible extubation 8/21  > monitor blood gas while on vent    - 8/21: d/c hypertonic saline    /Renal   #RUSS on CKD  Ddx: obstructive (willard in, no hydro on POCUS) vs low flow state vs AIN i/s/o cephalosporin use and drug rash   Urinalysis with 7 WBC, would not pursue steroids at this time per nephro  Worsening creatinine still but no urgent indication for HD, ATN   - kidney biopsy to r/o AIN planned 8/22   - cont empiric prednisone 40mg started 8/18   - trialed diuretics without improvement   - Continue to monitor, dose meds per eGFR. avoid nephrotoxic agents  - first HD session 8/19, will plan for additional fluid removal or HD sessions with nephro before extubation  - 8/22: IR kidney Bx, f/u results    #Hyperkalemia with EKG changes    7.2, hemolyzed, given insulin and D50 + calcium gluc   Likely i/s/o renal dysfunction   Received lasix 80 and 40 IV initially but without adequate response   Given bumex 2mg the morning of 8/12  Per nephro, started on bumex gtt with worsening creatinine   - decreasing urine output, now oliguric   - off bumex gtt     GI  Diet: NPO with tube feeds     Endocrine  #T2DM   A1C 7.1 in 7/2023   - c/w NPH to 14U q6 with mod SS  -- 8/21: If PM FS elevated, increase to 16U  -- 8/22: NPH 18U  - BG goal 140-200     ID   No fever/leukocytosis, recheck temp   Hx latent TB which was treated, no concern for TB   Started on empiric vancomycin and aztreonam (cefepime/zosyn allergy? developed rash req prednisone)   Trialed cefepime --> cefazolin (sputum MSSA), developed drug eruption - discontinued   Blood and urine cultures 8/11 demonstrating no growth currently  Rising leukocytosis with low grade temp  - start empiric antibx if febrile > 101  - BC NGTD  - willard removed     Heme/Onc  ppx: heparin q8 hrs   8/21: Coag Panel for IR/kidney bx 8/22 8/22: confirm with IR regarding restarting DVT PPx within 12-24h s/p IR kidney Bx    Ethics  GOC - Per previous GOC with daughter and , reported that they have not talked about end of life care with the patient. For now, they wanted "everything to be done" including CPR, [facility]   72 y/o F DM on insulin, HTN, CKD, CHFpEF, breast CA, respiratory arrest and cardiac arrest (2018), CVA with residual weakness, aspiration PNA h/o trache, PEG, both removed presents with respiratory distress requiring intubation. May be volume overload i/s/o CHF/CKD vs decrease respiratory drive (given oxygen at home). Improving mental status however worsening renal function and anuria, concerning for ATN vs AIN.     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 No Hx Afib or documented   STEPHANIE with no TRINITY thrombus or intracardiac shunts   - avoid hypertension with BP < 180 systolic   - holding ASA for possible kidney biopsy planned for 8/22   - c/w statin   - 8/22: wean sedation as tolerated    Cardiac   #Hypertension  - Labetalol 500 with cardene gtt, wean cardene as tolerated    #Shock  #Hypertension   - Initially vasoplegic from prop  - weaned vaso and levo, now hypertensive likely from fluid overload with renal dysfunction   > changed coreg to labetalol and uptitrated to 400 TID for BP control given CVA   > on nitro drip     #CHFpEF   TTE 7/2023 with EF 59%, with severe LVH and diastolic dysfunction   pro-BNP > 17161, trop 78   Repeat TTE with EF 60% normal systolic and grade 1 diastolic dysfunction     Pulmonary   #Acute hypercapnic and hypoxemic respiratory failure   DDx: aspiration vs volume overload vs sedating medication decreasing drive (was given nyquil)   VBG with respiratory acidosis pCO2 111  CXR with small right pleural effusion, no consolidations/opacities  > c/w mechanical ventilation   > PS trials, plan for possible extubation 8/21  > monitor blood gas while on vent    - 8/21: d/c hypertonic saline    /Renal   #RUSS on CKD  Ddx: obstructive (willard in, no hydro on POCUS) vs low flow state vs AIN i/s/o cephalosporin use and drug rash   Urinalysis with 7 WBC, would not pursue steroids at this time per nephro  Worsening creatinine still but no urgent indication for HD, ATN   - kidney biopsy to r/o AIN planned 8/22   - cont empiric prednisone 40mg started 8/18   - trialed diuretics without improvement   - Continue to monitor, dose meds per eGFR. avoid nephrotoxic agents  - first HD session 8/19, will plan for additional fluid removal or HD sessions with nephro before extubation  - 8/22: IR kidney Bx, f/u results  - 8/23: f/u IR/anesthesia plan to Bx or not  -- Strict IOs per nephro  -- Willard    #Hyperkalemia with EKG changes    7.2, hemolyzed, given insulin and D50 + calcium gluc   Likely i/s/o renal dysfunction   Received lasix 80 and 40 IV initially but without adequate response   Given bumex 2mg the morning of 8/12  Per nephro, started on bumex gtt with worsening creatinine   - decreasing urine output, now oliguric   - off bumex gtt     GI  Diet: NPO with tube feeds     Endocrine  #T2DM   A1C 7.1 in 7/2023   - c/w NPH to 14U q6 with mod SS  -- 8/21: If PM FS elevated, increase to 16U  -- 8/22: NPH 18U  - BG goal 140-200   -- 8/23: Sliding scale but hold NPH. Permissive transient hyperglycemia given NPO     ID   No fever/leukocytosis, recheck temp   Hx latent TB which was treated, no concern for TB   Started on empiric vancomycin and aztreonam (cefepime/zosyn allergy? developed rash req prednisone)   Trialed cefepime --> cefazolin (sputum MSSA), developed drug eruption - discontinued   Blood and urine cultures 8/11 demonstrating no growth currently  Rising leukocytosis with low grade temp  - start empiric antibx if febrile > 101  - BC NGTD  - willard removed     Heme/Onc  ppx: heparin q8 hrs   8/21: Coag Panel for IR/kidney bx 8/22 8/22: confirm with IR regarding restarting DVT PPx within 12-24h s/p IR kidney Bx  8/23: AC contingent on Bx, f/u IR/anes    Ethics  GOC - Per previous GOC with daughter and , reported that they have not talked about end of life care with the patient. For now, they wanted "everything to be done" including CPR, continuing with mech ventilation/intubation, pressors   74 y/o F DM on insulin, HTN, CKD, CHFpEF, breast CA, respiratory arrest and cardiac arrest (2018), CVA with residual weakness, aspiration PNA h/o trache, PEG, both removed presents with respiratory distress requiring intubation. May be volume overload i/s/o CHF/CKD vs decrease respiratory drive (given oxygen at home). Improving mental status however worsening renal function and anuria, concerning for ATN vs AIN.     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 No Hx Afib or documented   STEPHANIE with no TRINITY thrombus or intracardiac shunts   - avoid hypertension with BP < 180 systolic   - holding ASA for possible kidney biopsy planned for 8/22   -- 8/23: Bx deferred, restart ASA?  - c/w statin   - 8/22: wean sedation as tolerated    Cardiac   #Hypertension  - Labetalol 500 with cardene gtt, wean cardene as tolerated    #Shock  #Hypertension   - Initially vasoplegic from prop  - weaned vaso and levo, now hypertensive likely from fluid overload with renal dysfunction   > changed coreg to labetalol and uptitrated to 400 TID for BP control given CVA   > on nitro drip     #CHFpEF   TTE 7/2023 with EF 59%, with severe LVH and diastolic dysfunction   pro-BNP > 07452, trop 78   Repeat TTE with EF 60% normal systolic and grade 1 diastolic dysfunction     Pulmonary   #Acute hypercapnic and hypoxemic respiratory failure   DDx: aspiration vs volume overload vs sedating medication decreasing drive (was given nyquil)   VBG with respiratory acidosis pCO2 111  CXR with small right pleural effusion, no consolidations/opacities  > c/w mechanical ventilation   > PS trials, plan for possible extubation 8/21  > monitor blood gas while on vent    - 8/21: d/c hypertonic saline    /Renal   #RUSS on CKD  Ddx: obstructive (willard in, no hydro on POCUS) vs low flow state vs AIN i/s/o cephalosporin use and drug rash   Urinalysis with 7 WBC, would not pursue steroids at this time per nephro  Worsening creatinine still but no urgent indication for HD, ATN   - kidney biopsy to r/o AIN planned 8/22   - cont empiric prednisone 40mg started 8/18   - trialed diuretics without improvement   - Continue to monitor, dose meds per eGFR. avoid nephrotoxic agents  - first HD session 8/19, will plan for additional fluid removal or HD sessions with nephro before extubation  - 8/22: IR kidney Bx, f/u results  - 8/23: Bx deferred  -- Bx non-urgent per nephro  -- IR counseled proxy while obtaining consent but proxy unable to decide  -- Strict IOs per nephro  -- Willard    #Hyperkalemia with EKG changes    7.2, hemolyzed, given insulin and D50 + calcium gluc   Likely i/s/o renal dysfunction   Received lasix 80 and 40 IV initially but without adequate response   Given bumex 2mg the morning of 8/12  Per nephro, started on bumex gtt with worsening creatinine   - decreasing urine output, now oliguric   - off bumex gtt     GI  Diet: NPO  - 8/23: Bx deferred, restart TF?    Endocrine  #T2DM   A1C 7.1 in 7/2023   - c/w NPH to 14U q6 with mod SS  -- 8/21: If PM FS elevated, increase to 16U  -- 8/22: NPH 18U  - BG goal 140-200   -- 8/23: If NPO, sliding scale but hold NPH + permissive transient hyperglycemia.    ID   No fever/leukocytosis, recheck temp   Hx latent TB which was treated, no concern for TB   Started on empiric vancomycin and aztreonam (cefepime/zosyn allergy? developed rash req prednisone)   Trialed cefepime --> cefazolin (sputum MSSA), developed drug eruption - discontinued   Blood and urine cultures 8/11 demonstrating no growth currently  Rising leukocytosis with low grade temp  - start empiric antibx if febrile > 101  - BC NGTD  - willard removed     Heme/Onc  ppx: heparin q8 hrs   8/21: Coag Panel for IR/kidney bx 8/22 8/22: confirm with IR regarding restarting DVT PPx within 12-24h s/p IR kidney Bx  8/23: AC contingent on Bx   -- Restart AC    Ethics  GOC - Per previous GOC with daughter and , reported that they have not talked about end of life care with the patient. For now, they wanted "everything to be done" including CPR, continuing with mech ventilation/intubation, pressors

## 2023-08-23 NOTE — ADVANCED PRACTICE NURSE CONSULT - REASON FOR CONSULT
Patient seen on skin care rounds after wound care referral received for assessment of skin impairment and recommendations of topical management for iad/mad. Patient is a 74 y/o F DM on insulin, HTN, CKD, CHFpEF, breast CA, respiratory arrest and cardiac arrest (2018), CVA with residual weakness, aspiration PNA h/o trach, PEG, both removed, presented with respiratory distress requiring intubation. May be volume overload i/s/o CHF/CKD vs decrease respiratory drive (given oxygen at home). Improving mental status however worsening renal function and anuria, concerning for ATN vs AIN, nephrology following. Patient on levophed 8/11-8/18 for hypotension, on procardia infusion since 8/20. Cardiology following. Wheeler placed at bedside 8/23. Chart reviewed: WBC 16.96, H/H 7.7/24, INR 1.05, Platelets 314, hA1c 7.1, BMI 27.7, alesia 15.

## 2023-08-23 NOTE — PRE PROCEDURE NOTE - PRE PROCEDURE EVALUATION
Interventional Radiology    HPI: 73y Female with HTN, DM2, CVA, breast CA-bilateral mastectomy, recurrent aspiration pneumonia/respiratory failure, and CKD, 8/11/23 p/w acute hypercapnic respiratory failure; c/b RUSS. IR consulted for renal biopsy to evaluate for ischemic ATN vs AIN.    Allergies: isoniazid (Rash)  nafcillin (Unknown)  hydrALAZINE (Rash)  vitamin E (Short breath; Urticaria; Hives)  doxycycline (Rash)  cefepime (Rash)  NIFEdipine (Urticaria; Hives)    Medications (Abx/Cardiac/Anticoagulation/Blood Products)    heparin   Injectable: 5000 Unit(s) SubCutaneous (08-21 @ 14:12)  labetalol: 500 milliGRAM(s) Oral (08-23 @ 05:11)  niCARdipine Infusion: 25 mL/Hr IV Continuous (08-21 @ 18:59)    Data:    T(C): 36.2  HR: 63  BP: --  RR: 13  SpO2: 100%    Exam  General: No acute distress  Chest: Non labored breathing  Abdomen: Non-distended  Extremities: No swelling, warm    -WBC 16.96 / HgB 7.7 / Hct 24.0 / Plt 314  -Na 133 / Cl 99 / BUN 65 / Glucose 232  -K 4.1 / CO2 25 / Cr 2.34  -ALT 29 / Alk Phos 159 / T.Bili 0.3  -INR1.05    Imaging:     Plan:   Renal parenchymal biopsy   -- Relevant imaging and labs were reviewed.   -- No additional antibiotics are indicated for this procedure.   -- Risks, benefits, and alternatives were explained and informed consent was obtained.   Interventional Radiology    HPI: 73y Female with HTN, DM2, CVA, breast CA-bilateral mastectomy, recurrent aspiration pneumonia/respiratory failure, and CKD, 8/11/23 p/w acute hypercapnic respiratory failure; c/b RUSS. IR consulted for renal biopsy to evaluate for ischemic ATN vs AIN.    Allergies: isoniazid (Rash)  nafcillin (Unknown)  hydrALAZINE (Rash)  vitamin E (Short breath; Urticaria; Hives)  doxycycline (Rash)  cefepime (Rash)  NIFEdipine (Urticaria; Hives)    Medications (Abx/Cardiac/Anticoagulation/Blood Products)    heparin   Injectable: 5000 Unit(s) SubCutaneous (08-21 @ 14:12)  labetalol: 500 milliGRAM(s) Oral (08-23 @ 05:11)  niCARdipine Infusion: 25 mL/Hr IV Continuous (08-21 @ 18:59)    Data:    T(C): 36.2  HR: 63  BP: --  RR: 13  SpO2: 100%    Exam  General: intubated, sedated   Chest: intubated  Abdomen: Non-distended      -WBC 16.96 / HgB 7.7 / Hct 24.0 / Plt 314  -Na 133 / Cl 99 / BUN 65 / Glucose 232  -K 4.1 / CO2 25 / Cr 2.34  -ALT 29 / Alk Phos 159 / T.Bili 0.3  -INR1.05    Imaging:     Plan:   Renal parenchymal biopsy   -- Relevant imaging and labs were reviewed.   -- No additional antibiotics are indicated for this procedure.   -- Risks, benefits, and alternatives were explained and informed consent was obtained.

## 2023-08-23 NOTE — ADVANCED PRACTICE NURSE CONSULT - ASSESSMENT
Patient on multiple gtts including precedex, procardia. Ventilator dependent, + ETT mucosa intact, FiO2 30%. Incontinent of urine and stool, willard placed at bedside by primary RN. Skin warm, dry with increased moisture in intertriginous folds, adequate skin turgor, hyperpigmented peeling skin to bilateral elbows.     Bilateral groin, perineum, sacrum to bilateral buttocks- moisture and incontinence associated dermatitis as evidenced by erythema, hyperpigmentation, increased moisture and denuded skin. No evidence of candidiasis.  Patient's daughter at bedside concerned about friable denuded skin, area with no drainage at this time.     Bilateral lower extremities warm to touch with increased coolness from midfoot to distal toes. Capillary refill <3 seconds.         +1 palpable DP/PT pulses.     -Right foot: slow blanching erythema to dorsal aspect of 5th toe (0.5x0.3x0cm) and plantar aspect of 4th toe ([id number]), both areas with no drainage, periwound skin intact, not over pressure points, DDx; arterial wound vs acute skin changes in critically ill patient, 2/2 levophed infusion. Goals of care: offload pressure, protect from friction and shear, monitor for tissue type changes.     -Right heel with healing stage 2 pressure injury (as per patient's daughter at bedside, was previously drained by their podiatrist) now presents as scab lifting at edges exposing reepithelialized/hypopigmented skin underneath, entire area measures [id number]. No drainage, no odor. Goals of care: offload pressure, protect from friction and shear, monitor for tissue type changes.     Patient continues to be critically ill- at high risk for skin breakdown. On assessment patient on a low air loss surface, heel offloading boots in place, Z-flow positioning pillow utilized, moisture management in place with use of one breathable incontinence pad. Turned and positioned per protocol.  Patient on multiple gtts including precedex, procardia. Ventilator dependent, + ETT mucosa intact, FiO2 30%. Incontinent of urine and stool, willard placed at bedside by primary RN. Skin warm, dry with increased moisture in intertriginous folds, adequate skin turgor, hyperpigmented peeling skin to bilateral elbows.     Bilateral groin, perineum, sacrum to bilateral buttocks- moisture and incontinence associated dermatitis as evidenced by erythema, hyperpigmentation, increased moisture and denuded skin. No evidence of candidiasis.  Patient's daughter at bedside concerned about friable denuded skin, area with no drainage at this time.     Bilateral lower extremities warm to touch with increased coolness from midfoot to distal toes. Capillary refill <3 seconds.         +1 palpable DP/PT pulses.     -Right foot: slow blanching erythema to dorsal aspect of 5th toe (0.4x0.3x0cm) and plantar aspect of 4th toe ([id number]), both areas with no drainage, periwound skin intact, not over pressure points, DDx; arterial wound vs acute skin changes in critically ill patient, 2/2 levophed infusion. Goals of care: offload pressure, protect from friction and shear, monitor for tissue type changes.     -Right heel with healing stage 2 pressure injury (as per patient's daughter at bedside, was previously drained by their podiatrist) now presents as scab lifting at edges exposing reepithelialized/hypopigmented skin underneath, entire area measures [id number]. No drainage, no odor. Goals of care: offload pressure, protect from friction and shear, monitor for tissue type changes.     Patient continues to be critically ill- at high risk for skin breakdown. On assessment patient on a low air loss surface, heel offloading boots in place, Z-flow positioning pillow utilized, moisture management in place with use of one breathable incontinence pad. Turned and positioned per protocol.  Patient on multiple gtts including precedex, procardia. Ventilator dependent, + ETT mucosa intact, FiO2 30%. +OGT, feeds on hold, patient NPO for procedure. Incontinent of urine and stool, willard placed at bedside by primary RN. Skin warm, dry with increased moisture in intertriginous folds, adequate skin turgor, hyperpigmented peeling skin to bilateral elbows.     Bilateral groin, perineum, sacrum to bilateral buttocks- moisture and incontinence associated dermatitis as evidenced by erythema, hyperpigmentation, increased moisture and denuded skin. No evidence of candidiasis.  Patient's daughter at bedside concerned about friable denuded skin, area with no drainage at this time.     Bilateral lower extremities warm to touch with increased coolness from midfoot to distal toes. Capillary refill <3 seconds.         +1 palpable DP/PT pulses.     -Right foot: slow blanching erythema to dorsal aspect of 5th toe (0.4x0.3x0cm) and plantar aspect of 4th toe (0.5cmx0.4mme0va), both areas with no drainage, periwound skin intact, not over pressure points, DDx; arterial wound vs acute skin changes in critically ill patient, 2/2 levophed infusion. Goals of care: offload pressure, protect from friction and shear, monitor for tissue type changes.     -Right heel with healing stage 2 pressure injury (as per patient's daughter at bedside, was previously drained by their podiatrist) now presents as scab lifting at edges exposing reepithelialized/hypopigmented skin underneath, entire area measures [id number]. No drainage, no odor. Goals of care: offload pressure, protect from friction and shear, monitor for tissue type changes.     Patient continues to be critically ill- at high risk for skin breakdown. On assessment patient on a low air loss surface, heel offloading boots in place, Z-flow positioning pillow utilized, moisture management in place with use of one breathable incontinence pad. Turned and positioned per protocol.

## 2023-08-23 NOTE — PROGRESS NOTE ADULT - ASSESSMENT
72 y/o F well known to me from my [facility]. she was admitted at [facility] 7/12-7/22 w aspiration PNA, was treated w CEFEPIME, developed an allergic rash,  dCHF, + MAC on AFB culture, had been progressively getting more and more lethargic and dyspneic at home since DC.   In  am of 8/11/23  ptn presented with respiratory distress w hypoxia and hypercarbia requiring intubation 2/2 volume overload +/- Asp PNA      Neuro   responds appropriately while intubated  Baseline MS AOx3, aphasic   - h/o CVA , on aspirin and statin . resumed w feeding tube, now off ASA in preparation for renal Bx  - eeg  2/2 tremors, no sz focus  - starting to become more responsive   - MRI 8/17:  new R cerebellar infarct, old left PCA/Occipital infarct. probably embolic in nature, did not tolerate full AC in the past. pending STEPHANIE.       Cardiac   Ptn well known to Dr. Dunn, consult reviewed   CHFpEF   TTE 7/2023 with EF 59%, with severe LVH and diastolic dysfunction   pro-BNP > 49419, trop 78       Pulmonary   Acute hypercapnic and hypoxemic respiratory failure   well known to Dr. Mcnulty, consult reviewed   DDx: aspiration vs volume overload   VBG with respiratory acidosis pCO2 111  CT chest: mod ( worsening) R pl effusion, h/o RUL cavity, +MAC  - cefepime DCed on 8/13, started on Moxifloxacin on 8/13, since 8/14 off ABx    Renal   Hyperkalemia with EKG changes  , initially treated w LOKELMA,  now resolved   Ptn well know to Dr. Colon, consult reviewed      worsening renal function, almost anuric, fluid overloaded, BP elevated, on nitro drip, labetalol, on empiric steroids for AIN ,   started HD 8/19, and 8/21, no HD needed today  still awaiting renal biopsy , ASA on hold, AC on hold    GI  NPO for now, on tube feeds  would start GI ppx w IV PPI    Endocrine  T2DM   A1C 7.1 in 7/2023   - BG goal 140-200     ID   No fever/leukocytosis, recheck temp   Hx latent TB which was treated, no concern for TB   - grew MAC on AFB culture from most recent admission. would call ID consult  Started on empiric vancomycin and aztreonam,  changed to cefepime 8/12, cefepime dced on 8/13(cefepime/zosyn allergy.  developed rash when on cefepime on previous admission ) . started on AVALOX on 8/13, off ABx on 8/14. ptn has an allergic rash, prob 2/2 cefepime  - f/u MRSA   - all cxs NTD    Heme/Onc  ppx: heparin q8 hrs     Ethics  GOC - Discussed GOC with daughter and , they have opted in the past for full code. and she remains full code at present

## 2023-08-23 NOTE — PROGRESS NOTE ADULT - SUBJECTIVE AND OBJECTIVE BOX
Subjective: Patient seen and examined. No new events except as noted.   remains intubated in ICU    at bedside   arousable   Remains on nicardipine gtt     REVIEW OF SYSTEMS:    Unable to obtain       MEDICATIONS:  MEDICATIONS  (STANDING):  albuterol/ipratropium for Nebulization 3 milliLiter(s) Nebulizer every 8 hours  atorvastatin 40 milliGRAM(s) Oral at bedtime  chlorhexidine 0.12% Liquid 15 milliLiter(s) Oral Mucosa every 12 hours  chlorhexidine 2% Cloths 1 Application(s) Topical daily  dexMEDEtomidine Infusion 0.1 MICROgram(s)/kG/Hr (1.66 mL/Hr) IV Continuous <Continuous>  dextrose 5%. 1000 milliLiter(s) (50 mL/Hr) IV Continuous <Continuous>  dextrose 5%. 1000 milliLiter(s) (100 mL/Hr) IV Continuous <Continuous>  dextrose 50% Injectable 12.5 Gram(s) IV Push once  dextrose 50% Injectable 25 Gram(s) IV Push once  dextrose 50% Injectable 25 Gram(s) IV Push once  glucagon  Injectable 1 milliGRAM(s) IntraMuscular once  insulin lispro (ADMELOG) corrective regimen sliding scale   SubCutaneous every 6 hours  insulin NPH human recombinant 18 Unit(s) SubCutaneous every 6 hours  labetalol 500 milliGRAM(s) Oral three times a day  niCARdipine Infusion 5 mG/Hr (25 mL/Hr) IV Continuous <Continuous>  predniSONE   Tablet 40 milliGRAM(s) Oral daily      PHYSICAL EXAM:  T(C): 36.2 (08-23-23 @ 08:00), Max: 37.3 (08-22-23 @ 20:00)  HR: 62 (08-23-23 @ 08:00) (58 - 74)  BP: --  RR: 12 (08-23-23 @ 08:00) (12 - 24)  SpO2: 100% (08-23-23 @ 08:00) (98% - 100%)  Wt(kg): --  I&O's Summary    22 Aug 2023 07:01  -  23 Aug 2023 07:00  --------------------------------------------------------  IN: 1153.7 mL / OUT: 0 mL / NET: 1153.7 mL          Appearance: NAD, intubated, sedated	  HEENT: dry oral mucosa, LIJ AMBROCIO   Lymphatic: No lymphadenopathy  Cardiovascular: Normal S1 S2, No JVD, No murmurs, No edema  Respiratory: Decreased BS, intubated on ventilator	  Psychiatry: A & O x 0, sedated  Gastrointestinal: Soft, Non-tender, + BS, + OGT	  Skin: No rashes, No ecchymoses, No cyanosis	  Extremities: No strength/ROM 2/2 sedation, + BL LE edema  Vascular: Peripheral pulses palpable 2+ bilaterally  +willard          LABS:    CARDIAC MARKERS:                                7.7    16.96 )-----------( 314      ( 23 Aug 2023 00:05 )             24.0     08-23    133<L>  |  99  |  65<H>  ----------------------------<  232<H>  4.1   |  25  |  2.34<H>    Ca    8.3<L>      23 Aug 2023 00:05  Phos  5.9     08-23  Mg     2.40     08-23    TPro  6.1  /  Alb  2.7<L>  /  TBili  0.3  /  DBili  x   /  AST  36<H>  /  ALT  29  /  AlkPhos  159<H>  08-23    proBNP:   Lipid Profile:   HgA1c:   TSH:             TELEMETRY: 	  SR  ECG:  	  RADIOLOGY:   DIAGNOSTIC TESTING:  [ ] Echocardiogram:  [ ]  Catheterization:  [ ] Stress Test:    OTHER:

## 2023-08-23 NOTE — PROGRESS NOTE ADULT - SUBJECTIVE AND OBJECTIVE BOX
Patient is a 73y old  Female who presents with a chief complaint of Respiratory distress (23 Aug 2023 11:08)      SUBJECTIVE / OVERNIGHT EVENTS: remains intubated in MICU, on precedex, responds appropriately, renal Bx deferred, no need for HD today, on Nicardeipine drip and Labetalol for BP control, on po prednisone for RUSS 2/2 possible AIN    MEDICATIONS  (STANDING):  albuterol/ipratropium for Nebulization 3 milliLiter(s) Nebulizer every 8 hours  atorvastatin 40 milliGRAM(s) Oral at bedtime  chlorhexidine 0.12% Liquid 15 milliLiter(s) Oral Mucosa every 12 hours  chlorhexidine 2% Cloths 1 Application(s) Topical daily  dexMEDEtomidine Infusion 0.1 MICROgram(s)/kG/Hr (1.66 mL/Hr) IV Continuous <Continuous>  dextrose 5%. 1000 milliLiter(s) (100 mL/Hr) IV Continuous <Continuous>  dextrose 5%. 1000 milliLiter(s) (50 mL/Hr) IV Continuous <Continuous>  dextrose 50% Injectable 12.5 Gram(s) IV Push once  dextrose 50% Injectable 25 Gram(s) IV Push once  dextrose 50% Injectable 25 Gram(s) IV Push once  glucagon  Injectable 1 milliGRAM(s) IntraMuscular once  heparin   Injectable 5000 Unit(s) SubCutaneous every 8 hours  insulin lispro (ADMELOG) corrective regimen sliding scale   SubCutaneous every 6 hours  insulin NPH human recombinant 18 Unit(s) SubCutaneous every 6 hours  labetalol 500 milliGRAM(s) Oral three times a day  niCARdipine Infusion 5 mG/Hr (25 mL/Hr) IV Continuous <Continuous>  predniSONE   Tablet 40 milliGRAM(s) Oral daily    MEDICATIONS  (PRN):  acetaminophen   Oral Liquid .. 650 milliGRAM(s) Oral every 6 hours PRN Temp greater or equal to 38C (100.4F), Mild Pain (1 - 3)  dextrose Oral Gel 15 Gram(s) Oral once PRN Blood Glucose LESS THAN 70 milliGRAM(s)/deciliter      Vital Signs Last 24 Hrs  T(F): 97.2 (08-23-23 @ 16:00), Max: 99.2 (08-22-23 @ 20:00)  HR: 71 (08-23-23 @ 18:42) (58 - 74)  BP: --  RR: 19 (08-23-23 @ 18:42) (12 - 24)  SpO2: 98% (08-23-23 @ 18:42) (97% - 100%)  Telemetry:   CAPILLARY BLOOD GLUCOSE      POCT Blood Glucose.: 232 mg/dL (23 Aug 2023 17:32)  POCT Blood Glucose.: 181 mg/dL (23 Aug 2023 11:53)  POCT Blood Glucose.: 176 mg/dL (23 Aug 2023 05:08)    I&O's Summary    22 Aug 2023 07:01  -  23 Aug 2023 07:00  --------------------------------------------------------  IN: 1153.7 mL / OUT: 0 mL / NET: 1153.7 mL    23 Aug 2023 07:01  -  23 Aug 2023 19:50  --------------------------------------------------------  IN: 308.3 mL / OUT: 510 mL / NET: -201.7 mL        PHYSICAL EXAM:  GENERAL: NAD, well-developed  HEAD:  Atraumatic, Normocephalic  EYES: EOMI, PERRLA, conjunctiva and sclera clear  NECK: Supple, No JVD  CHEST/LUNG: Clear to auscultation bilaterally; No wheeze  HEART: Regular rate and rhythm; No murmurs, rubs, or gallops  ABDOMEN: Soft, Nontender, Nondistended; Bowel sounds present  EXTREMITIES:  2+ Peripheral Pulses, No clubbing, cyanosis, or edema  PSYCH: AAOx3  NEUROLOGY: non-focal  SKIN: No rashes or lesions    LABS:                        7.7    16.96 )-----------( 314      ( 23 Aug 2023 00:05 )             24.0     08-23    133<L>  |  99  |  65<H>  ----------------------------<  232<H>  4.1   |  25  |  2.34<H>    Ca    8.3<L>      23 Aug 2023 00:05  Phos  5.9     08-23  Mg     2.40     08-23    TPro  6.1  /  Alb  2.7<L>  /  TBili  0.3  /  DBili  x   /  AST  36<H>  /  ALT  29  /  AlkPhos  159<H>  08-23    PT/INR - ( 23 Aug 2023 00:05 )   PT: 11.8 sec;   INR: 1.05 ratio         PTT - ( 23 Aug 2023 00:05 )  PTT:24.9 sec      Urinalysis Basic - ( 23 Aug 2023 00:05 )    Color: x / Appearance: x / SG: x / pH: x  Gluc: 232 mg/dL / Ketone: x  / Bili: x / Urobili: x   Blood: x / Protein: x / Nitrite: x   Leuk Esterase: x / RBC: x / WBC x   Sq Epi: x / Non Sq Epi: x / Bacteria: x        RADIOLOGY & ADDITIONAL TESTS:    Imaging Personally Reviewed:    Consultant(s) Notes Reviewed:      Care Discussed with Consultants/Other Providers:

## 2023-08-24 NOTE — PROGRESS NOTE ADULT - ATTENDING COMMENTS
73 F with DM, CKD, HFpEF, h/o CVA and trach and PEG (now removed) here with acute hypoxemic and hypercapnic respiratory failure requiring intubation in setting of RUSS and acute pulmonary edema, possible ATN vs AIN    did Not get IR biopsy yesterday as patient's  ultimately did not want to consent given risks/benefits   patient more alert today, on PS trials but 10/5    # acute hypoxemic and hypercapnic respiratory failure  # RUSS/ATN/AIN  # acute pulmonary edema  # essential hypertension  - c/w full vent support for now, PS trials as tolerated  - wean precedex as tolerated  - increase labetalol,  - HD for fluid removal and to help with acute metabolic encephalopathy but no acute indication today, will diurese instead  - c/w prednisone for possible AIN, will taper based on nephrology reccs

## 2023-08-24 NOTE — PROGRESS NOTE ADULT - ASSESSMENT
74 y/o F DM on insulin, HTN, CKD, CHFpEF, breast CA, respiratory arrest and cardiac arrest (2018), CVA with residual weakness, aspiration PNA h/o trache, PEG, both removed presents with respiratory distress requiring intubation. May be volume overload i/s/o CHF/CKD vs decrease respiratory drive (given oxygen at home). Improving mental status however worsening renal function and anuria, concerning for ATN vs AIN.     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 No Hx Afib or documented   STEPHANIE with no TRINITY thrombus or intracardiac shunts   - avoid hypertension with BP < 180 systolic   - holding ASA for possible kidney biopsy planned for 8/22   -- 8/23: Bx deferred, restart ASA?  -- 8/24: restart ASA  - c/w statin   - 8/22: wean sedation as tolerated    Cardiac   #Hypertension  - 8/23: Labetalol 500 off cardene gtt, wean cardene as tolerated  - 8/24: increase Labetalol to 600 starting with the 2pm dose    #Shock  #Hypertension   - Initially vasoplegic from prop  - weaned vaso and levo, now hypertensive likely from fluid overload with renal dysfunction   > changed coreg to labetalol and uptitrated to 400 TID for BP control given CVA   > on nitro drip     #CHFpEF   TTE 7/2023 with EF 59%, with severe LVH and diastolic dysfunction   pro-BNP > 68287, trop 78   Repeat TTE with EF 60% normal systolic and grade 1 diastolic dysfunction     Pulmonary   #Acute hypercapnic and hypoxemic respiratory failure   DDx: aspiration vs volume overload vs sedating medication decreasing drive (was given nyquil)   VBG with respiratory acidosis pCO2 111  CXR with small right pleural effusion, no consolidations/opacities  > c/w mechanical ventilation   > PS trials, plan for possible extubation 8/21  > monitor blood gas while on vent    - 8/21: d/c hypertonic saline  - 8/24:   -- CPAP changed to 5/5 PSV/PEEP  -- AM ABG, f/u and if fine, d/c precedex and extubate to BiPAP    /Renal   #RUSS on CKD  Ddx: obstructive (willard in, no hydro on POCUS) vs low flow state vs AIN i/s/o cephalosporin use and drug rash   Urinalysis with 7 WBC, would not pursue steroids at this time per nephro  Worsening creatinine still but no urgent indication for HD, ATN   - kidney biopsy to r/o AIN planned 8/22   - cont empiric prednisone 40mg started 8/18   - trialed diuretics without improvement   - Continue to monitor, dose meds per eGFR. avoid nephrotoxic agents  - first HD session 8/19, will plan for additional fluid removal or HD sessions with nephro before extubation  - 8/22: IR kidney Bx, f/u results  - 8/23: Bx deferred  -- Bx non-urgent per nephro  -- IR counseled proxy while obtaining consent but proxy unable to decide  -- Strict IOs per nephro  -- Willard  - 8/24: Bumex 4 goal of net negative I/Os  -- hold HD per nephro  -- Consider pred taper 8/25    #Hyperkalemia with EKG changes    7.2, hemolyzed, given insulin and D50 + calcium gluc   Likely i/s/o renal dysfunction   Received lasix 80 and 40 IV initially but without adequate response   Given bumex 2mg the morning of 8/12  Per nephro, started on bumex gtt with worsening creatinine   - decreasing urine output, now oliguric   - off bumex gtt     GI  Diet: NPO  - 8/23: Bx deferred, restart TF?  - 8/24: NPO, restart TF if extubation performed    Endocrine  #T2DM   A1C 7.1 in 7/2023   - c/w NPH to 14U q6 with mod SS  -- 8/21: If PM FS elevated, increase to 16U  -- 8/22: NPH 18U  - BG goal 140-200   -- 8/23: If NPO, sliding scale but hold NPH + permissive transient hyperglycemia.    ID   No fever/leukocytosis, recheck temp   Hx latent TB which was treated, no concern for TB   Started on empiric vancomycin and aztreonam (cefepime/zosyn allergy? developed rash req prednisone)   Trialed cefepime --> cefazolin (sputum MSSA), developed drug eruption - discontinued   Blood and urine cultures 8/11 demonstrating no growth currently  Rising leukocytosis with low grade temp  - start empiric antibx if febrile > 101  - BC NGTD  - willard removed     Heme/Onc  ppx: heparin q8 hrs   8/21: Coag Panel for IR/kidney bx 8/22 8/22: confirm with IR regarding restarting DVT PPx within 12-24h s/p IR kidney Bx  8/23: AC contingent on Bx   -- Restart SQH 5000    Ethics  GOC - Per previous GOC with daughter and , reported that they have not talked about end of life care with the patient. For now, they wanted "everything to be done" including CPR, continuing with mech ventilation/intubation, pressors

## 2023-08-24 NOTE — PROGRESS NOTE ADULT - SUBJECTIVE AND OBJECTIVE BOX
INTERVAL HPI/OVERNIGHT EVENTS: SBP 150s, HR WNL.    SUBJECTIVE: Patient seen and examined at bedside.     ROS: All negative except as listed above.    VITAL SIGNS:  ICU Vital Signs Last 24 Hrs  T(C): 36.1 (24 Aug 2023 08:00), Max: 36.7 (24 Aug 2023 03:00)  T(F): 97 (24 Aug 2023 08:00), Max: 98 (24 Aug 2023 03:00)  HR: 67 (24 Aug 2023 10:53) (59 - 78)  BP: --  BP(mean): --  ABP: 143/39 (24 Aug 2023 10:00) (126/31 - 156/44)  ABP(mean): 77 (24 Aug 2023 10:00) (-1 - 86)  RR: 25 (24 Aug 2023 10:00) (11 - 25)  SpO2: 100% (24 Aug 2023 10:53) (93% - 100%)    O2 Parameters below as of 24 Aug 2023 10:00  Patient On (Oxygen Delivery Method): cpap 5/5    O2 Concentration (%): 30      Mode: CPAP with PS, FiO2: 30, PEEP: 5, PS: 5, MAP: 7, PIP: 11  Plateau pressure:   P/F ratio:     08-23 @ 07:01  -  08-24 @ 07:00  --------------------------------------------------------  IN: 832.6 mL / OUT: 740 mL / NET: 92.6 mL    08-24 @ 07:01  -  08-24 @ 11:33  --------------------------------------------------------  IN: 91.5 mL / OUT: 75 mL / NET: 16.5 mL      CAPILLARY BLOOD GLUCOSE      POCT Blood Glucose.: 211 mg/dL (23 Aug 2023 23:48)      ECG: reviewed.    PHYSICAL EXAM:    GENERAL: NAD, lying in bed  HEAD:  Atraumatic, normocephalic  EYES: EOMI, PERRLA, conjunctiva and sclera clear  NECK: Supple, trachea midline, no JVD  HEART: Regular rate and rhythm, no murmurs, rubs, or gallops  LUNGS: Unlabored respirations on mechanical ventilation.  Clear to auscultation bilaterally, no crackles, wheezing, or rhonchi  ABDOMEN: Soft, nontender, nondistended, +BS  EXTREMITIES: 2+ peripheral pulses bilaterally, cap refill<2 secs. No clubbing, cyanosis, or edema  NERVOUS SYSTEM:  A&Ox2 not oriented to time, able to follow some commands, no focal deficits. LUE tremor   SKIN: No rashes or lesions    MEDICATIONS:  MEDICATIONS  (STANDING):  albuterol/ipratropium for Nebulization 3 milliLiter(s) Nebulizer every 8 hours  aspirin  chewable 81 milliGRAM(s) Oral daily  atorvastatin 40 milliGRAM(s) Oral at bedtime  chlorhexidine 0.12% Liquid 15 milliLiter(s) Oral Mucosa every 12 hours  chlorhexidine 2% Cloths 1 Application(s) Topical daily  dexMEDEtomidine Infusion 0.1 MICROgram(s)/kG/Hr (1.66 mL/Hr) IV Continuous <Continuous>  dextrose 5%. 1000 milliLiter(s) (50 mL/Hr) IV Continuous <Continuous>  dextrose 5%. 1000 milliLiter(s) (100 mL/Hr) IV Continuous <Continuous>  dextrose 50% Injectable 12.5 Gram(s) IV Push once  dextrose 50% Injectable 25 Gram(s) IV Push once  dextrose 50% Injectable 25 Gram(s) IV Push once  glucagon  Injectable 1 milliGRAM(s) IntraMuscular once  heparin   Injectable 5000 Unit(s) SubCutaneous every 8 hours  insulin lispro (ADMELOG) corrective regimen sliding scale   SubCutaneous every 6 hours  insulin NPH human recombinant 18 Unit(s) SubCutaneous every 6 hours  labetalol 600 milliGRAM(s) Oral three times a day  predniSONE   Tablet 40 milliGRAM(s) Oral daily    MEDICATIONS  (PRN):  acetaminophen   Oral Liquid .. 650 milliGRAM(s) Oral every 6 hours PRN Temp greater or equal to 38C (100.4F), Mild Pain (1 - 3)  dextrose Oral Gel 15 Gram(s) Oral once PRN Blood Glucose LESS THAN 70 milliGRAM(s)/deciliter      ALLERGIES:  Allergies    isoniazid (Rash)  nafcillin (Unknown)  hydrALAZINE (Rash)  vitamin E (Short breath; Urticaria; Hives)  doxycycline (Rash)  cefepime (Rash)  NIFEdipine (Urticaria; Hives)    Intolerances        LABS:                        7.6    15.83 )-----------( 331      ( 24 Aug 2023 01:50 )             23.7     08-24    135  |  100  |  78<H>  ----------------------------<  186<H>  4.0   |  21<L>  |  2.66<H>    Ca    7.9<L>      24 Aug 2023 01:50  Phos  6.8     08-24  Mg     2.40     08-24    TPro  6.2  /  Alb  2.6<L>  /  TBili  0.3  /  DBili  x   /  AST  41<H>  /  ALT  39<H>  /  AlkPhos  164<H>  08-24    PT/INR - ( 24 Aug 2023 01:50 )   PT: 12.4 sec;   INR: 1.10 ratio         PTT - ( 24 Aug 2023 01:50 )  PTT:25.3 sec  Urinalysis Basic - ( 24 Aug 2023 01:50 )    Color: x / Appearance: x / SG: x / pH: x  Gluc: 186 mg/dL / Ketone: x  / Bili: x / Urobili: x   Blood: x / Protein: x / Nitrite: x   Leuk Esterase: x / RBC: x / WBC x   Sq Epi: x / Non Sq Epi: x / Bacteria: x      ABG:  pH, Arterial: 7.27 (08-24-23 @ 10:35)  pCO2, Arterial: 50 mmHg (08-24-23 @ 10:35)  pO2, Arterial: 77 mmHg (08-24-23 @ 10:35)  pH, Arterial: 7.34 (08-24-23 @ 01:50)  pCO2, Arterial: 42 mmHg (08-24-23 @ 01:50)  pO2, Arterial: 114 mmHg (08-24-23 @ 01:50)      vBG:    Micro:    Culture - Blood (collected 08-16-23 @ 20:46)  Source: .Blood Blood-Peripheral  Final Report (08-22-23 @ 02:01):    No growth at 5 days    Culture - Blood (collected 08-16-23 @ 20:13)  Source: .Blood Blood-Peripheral  Final Report (08-22-23 @ 02:01):    No growth at 5 days    Culture - Blood (collected 08-11-23 @ 08:11)  Source: .Blood Blood-Peripheral  Final Report (08-16-23 @ 13:01):    No growth at 5 days    Culture - Blood (collected 08-11-23 @ 08:11)  Source: .Blood Blood-Peripheral  Final Report (08-16-23 @ 13:01):    No growth at 5 days        Culture - Sputum (collected 08-16-23 @ 15:00)  Source: .Sputum Sputum  Gram Stain (08-16-23 @ 23:44):    Few polymorphonuclear leukocytes per low power field    Rare Squamous epithelial cells per low power field    Few Gram Positive Cocci in Clusters per oil power field  Final Report (08-18-23 @ 20:19):    Normal Respiratory Cate present    Culture - Sputum (collected 08-11-23 @ 17:50)  Source: .Sputum Sputum  Gram Stain (08-12-23 @ 07:24):    Rare polymorphonuclear leukocytes per low power field    Rare Squamous epithelial cells per low power field    Numerous Gram positive cocci in pairs per oil power field  Final Report (08-13-23 @ 21:49):    Numerous Staphylococcus aureus  Organism: Staphylococcus aureus (08-13-23 @ 21:49)  Organism: Staphylococcus aureus (08-13-23 @ 21:49)      -  Clindamycin: S <=0.25      -  Oxacillin: S 1 Oxacillin predicts susceptibility for dicloxacillin, methicillin, and nafcillin      -  Gentamicin: S <=1 Should not be used as monotherapy      -  Cefazolin: S <=4      -  Vancomycin: S 2      -  Tetracycline: S <=1      Method Type: SIMON      -  Ampicillin/Sulbactam: S <=8/4      -  Penicillin: R >8      -  Rifampin: S <=1 Should not be used as monotherapy      -  Erythromycin: R >4      -  Trimethoprim/Sulfamethoxazole: S <=0.5/9.5        RADIOLOGY & ADDITIONAL TESTS: Reviewed.

## 2023-08-24 NOTE — PROGRESS NOTE ADULT - SUBJECTIVE AND OBJECTIVE BOX
Remains intubated/sedated on Precedex      VITAL:  T(C): , Max: 36.7 (08-24-23 @ 03:00)  T(F): , Max: 98 (08-24-23 @ 03:00)  HR: 64 (08-24-23 @ 06:00)  BP: 145/38  RR: 13 (08-24-23 @ 06:00)  SpO2: 100% (08-24-23 @ 06:00)  urine output 740cc/24h        PHYSICAL EXAM:  Constitutional: intubated/sedated  HEENT: (+)ETT/OGT  Neck: Supple, No JVD  Respiratory: coarse BS b/l/decreased at bases  Cardiovascular: RRR s1s2, no m/r/g  Gastrointestinal: BS+, soft, NT/ND  Extremities: No peripheral edema b/l  Back: no CVAT b/l  Skin: No rashes, no nevi  Access: [facility]      LABS:                        7.6    15.83 )-----------( 331      ( 24 Aug 2023 01:50 )             23.7     Na(135)/K(4.0)/Cl(100)/HCO3(21)/BUN(78)/Cr(2.66)Glu(186)/Ca(7.9)/Mg(2.40)/PO4(6.8)    08-24 @ 01:50  Na(133)/K(4.1)/Cl(99)/HCO3(25)/BUN(65)/Cr(2.34)Glu(232)/Ca(8.3)/Mg(2.40)/PO4(5.9)    08-23 @ 00:05  Na(135)/K(3.7)/Cl(98)/HCO3(25)/BUN(47)/Cr(1.93)Glu(292)/Ca(8.5)/Mg(2.30)/PO4(3.7)    08-22 @ 00:35  Na(136)/K(3.7)/Cl(99)/HCO3(27)/BUN(38)/Cr(1.68)Glu(334)/Ca(8.6)/Mg(2.20)/PO4(3.9)    08-21 @ 16:52        IMPRESSION: 73F w/ HTN, DM2, CVA, breast CA-bilateral mastectomy, recurrent aspiration pneumonia/respiratory failure, and CKD, 8/11/23 p/w acute hypercapnic respiratory failure; c/b RUSS    (1)Renal - CKD4     (2)RUSS - Nonoliguric. Creatinine slowly uptrending; no absolute need for another HD session, for now. Ischemic ATN, vs AIN. On Prednisone 40mg po qd given possibility of AIN. Awaiting renal biopsy.     (3)Hyperphosphatemia - slowly worsening, having not been dialyzed for past 3 days.    (4)Pulm- hypercapnic respiratory failure on admission - remains intubated    (5)CV - hypertensive; off Nicardipine gtt; on Labetalol 500tid at this point    (6)ID - PNA - s/p IV abx        RECOMMEND:  (1)Prednisone as ordered for now  (2)Could add Renvela powder, 1600mg TID via OGT  (3)F/U renal biopsy  (4)No HD for today; will continue to assess need on a daily basis  (5)Strict I/Os if possible  (6)Dose new meds for GFR<10/HD            Jimmy Colon MD  [facility]  Office/on call physician: [phone]  Cell (7a-7p): [phone]       Overnight events noted - s/p huddle where all services had agreed to renal biopsy;  ultimately refused.  Remains intubated  Mildly sedated on Precedex - following simple commands   at bedside      VITAL:  T(C): , Max: 36.7 (08-24-23 @ 03:00)  T(F): , Max: 98 (08-24-23 @ 03:00)  HR: 64 (08-24-23 @ 06:00)  BP: 145/38  RR: 13 (08-24-23 @ 06:00)  SpO2: 100% (08-24-23 @ 06:00)  urine output 740cc/24h        PHYSICAL EXAM:  Constitutional: intubated/mildly sedated but following simple commands  HEENT: (+)ETT/OGT  Neck: Supple, No JVD  Respiratory: coarse BS b/l  Cardiovascular: RRR s1s2, no m/r/g  Gastrointestinal: BS+, soft, NT/ND  Extremities: No peripheral edema b/l  Back: no CVAT b/l  Skin: No rashes, no nevi  Access: [facility]      LABS:                        7.6    15.83 )-----------( 331      ( 24 Aug 2023 01:50 )             23.7     Na(135)/K(4.0)/Cl(100)/HCO3(21)/BUN(78)/Cr(2.66)Glu(186)/Ca(7.9)/Mg(2.40)/PO4(6.8)    08-24 @ 01:50  Na(133)/K(4.1)/Cl(99)/HCO3(25)/BUN(65)/Cr(2.34)Glu(232)/Ca(8.3)/Mg(2.40)/PO4(5.9)    08-23 @ 00:05  Na(135)/K(3.7)/Cl(98)/HCO3(25)/BUN(47)/Cr(1.93)Glu(292)/Ca(8.5)/Mg(2.30)/PO4(3.7)    08-22 @ 00:35  Na(136)/K(3.7)/Cl(99)/HCO3(27)/BUN(38)/Cr(1.68)Glu(334)/Ca(8.6)/Mg(2.20)/PO4(3.9)    08-21 @ 16:52        IMPRESSION: 73F w/ HTN, DM2, CVA, breast CA-bilateral mastectomy, recurrent aspiration pneumonia/respiratory failure, and CKD, 8/11/23 p/w acute hypercapnic respiratory failure; c/b RUSS    (1)Renal - CKD4     (2)RUSS - Nonoliguric; creatinine slowly uptrending; rise in BUN in part from RUSS but in part from steroids. Mentation appears to be improving; it does not appear that patient has any component of uremic encephalopathy at present. No absolute need for another HD session, for now. Ischemic ATN, vs AIN. On Prednisone 40mg po qd given possibility of AIN.  refused biopsy yesterday. Ultimately not unreasonable to forgo renal biopsy here; we can plan for 2 week course of Prednisone as ordered, and then taper down.    (3)Hyperphosphatemia - slowly worsening, having not been dialyzed for past 3 days.    (4)Pulm- hypercapnic respiratory failure on admission - remains intubated    (5)CV - hypertensive; off Nicardipine gtt; on Labetalol 500tid at this point. No renal contraindication to adding diuretics at this point    (6)ID - PNA - s/p IV abx      RECOMMEND:  (1)Prednisone 40mg po qd x 2 week course, then taper down  (2)Can reinitiate ASA at this point  (3)Could add Renvela powder, 1600mg TID via OGT  (4)Can add Bumex,2mg IV 1-2x/day  (5)No HD for today; will continue to assess need on a daily basis  (6)Continue I/Os  (7)Dose new meds for GFR ~15ml/min            Jimmy Colon MD  [facility]  Office/on call physician: [phone]  Cell (7a-7p): [phone]

## 2023-08-24 NOTE — PROGRESS NOTE ADULT - SUBJECTIVE AND OBJECTIVE BOX
Patient is a 73y old  Female who presents with a chief complaint of Respiratory distress (24 Aug 2023 11:32)      SUBJECTIVE / OVERNIGHT EVENTS: ptn did not do well w pressure support overnight    MEDICATIONS  (STANDING):  albuterol/ipratropium for Nebulization 3 milliLiter(s) Nebulizer every 8 hours  aspirin  chewable 81 milliGRAM(s) Oral daily  atorvastatin 40 milliGRAM(s) Oral at bedtime  chlorhexidine 0.12% Liquid 15 milliLiter(s) Oral Mucosa every 12 hours  chlorhexidine 2% Cloths 1 Application(s) Topical daily  dexMEDEtomidine Infusion 0.1 MICROgram(s)/kG/Hr (1.66 mL/Hr) IV Continuous <Continuous>  dextrose 5%. 1000 milliLiter(s) (100 mL/Hr) IV Continuous <Continuous>  dextrose 5%. 1000 milliLiter(s) (50 mL/Hr) IV Continuous <Continuous>  dextrose 50% Injectable 12.5 Gram(s) IV Push once  dextrose 50% Injectable 25 Gram(s) IV Push once  dextrose 50% Injectable 25 Gram(s) IV Push once  glucagon  Injectable 1 milliGRAM(s) IntraMuscular once  heparin   Injectable 5000 Unit(s) SubCutaneous every 8 hours  insulin lispro (ADMELOG) corrective regimen sliding scale   SubCutaneous every 6 hours  insulin NPH human recombinant 18 Unit(s) SubCutaneous every 6 hours  labetalol 600 milliGRAM(s) Oral three times a day  predniSONE   Tablet 40 milliGRAM(s) Oral daily    MEDICATIONS  (PRN):  acetaminophen   Oral Liquid .. 650 milliGRAM(s) Oral every 6 hours PRN Temp greater or equal to 38C (100.4F), Mild Pain (1 - 3)  dextrose Oral Gel 15 Gram(s) Oral once PRN Blood Glucose LESS THAN 70 milliGRAM(s)/deciliter      Vital Signs Last 24 Hrs  T(F): 97 (08-24-23 @ 08:00), Max: 98 (08-24-23 @ 03:00)  HR: 77 (08-24-23 @ 13:00) (59 - 78)  BP: --  RR: 20 (08-24-23 @ 13:00) (11 - 25)  SpO2: 98% (08-24-23 @ 13:00) (93% - 100%)  Telemetry:   CAPILLARY BLOOD GLUCOSE      POCT Blood Glucose.: 127 mg/dL (24 Aug 2023 12:11)  POCT Blood Glucose.: 211 mg/dL (23 Aug 2023 23:48)  POCT Blood Glucose.: 232 mg/dL (23 Aug 2023 17:32)    I&O's Summary    23 Aug 2023 07:01  -  24 Aug 2023 07:00  --------------------------------------------------------  IN: 832.6 mL / OUT: 740 mL / NET: 92.6 mL    24 Aug 2023 07:01  -  24 Aug 2023 13:49  --------------------------------------------------------  IN: 116.4 mL / OUT: 115 mL / NET: 1.4 mL        PHYSICAL EXAM:  GENERAL: NAD, well-developed  HEAD:  Atraumatic, Normocephalic  EYES: EOMI, PERRLA, conjunctiva and sclera clear  NECK: Supple, No JVD  CHEST/LUNG: Clear to auscultation bilaterally; No wheeze  HEART: Regular rate and rhythm; No murmurs, rubs, or gallops  ABDOMEN: Soft, Nontender, Nondistended; Bowel sounds present  EXTREMITIES:  2+ Peripheral Pulses, No clubbing, cyanosis, or edema  PSYCH: AAOx3  NEUROLOGY: non-focal  SKIN: No rashes or lesions    LABS:                        7.6    15.83 )-----------( 331      ( 24 Aug 2023 01:50 )             23.7     08-24    135  |  100  |  78<H>  ----------------------------<  186<H>  4.0   |  21<L>  |  2.66<H>    Ca    7.9<L>      24 Aug 2023 01:50  Phos  6.8     08-24  Mg     2.40     08-24    TPro  6.2  /  Alb  2.6<L>  /  TBili  0.3  /  DBili  x   /  AST  41<H>  /  ALT  39<H>  /  AlkPhos  164<H>  08-24    PT/INR - ( 24 Aug 2023 01:50 )   PT: 12.4 sec;   INR: 1.10 ratio         PTT - ( 24 Aug 2023 01:50 )  PTT:25.3 sec      Urinalysis Basic - ( 24 Aug 2023 01:50 )    Color: x / Appearance: x / SG: x / pH: x  Gluc: 186 mg/dL / Ketone: x  / Bili: x / Urobili: x   Blood: x / Protein: x / Nitrite: x   Leuk Esterase: x / RBC: x / WBC x   Sq Epi: x / Non Sq Epi: x / Bacteria: x        RADIOLOGY & ADDITIONAL TESTS:    Imaging Personally Reviewed:    Consultant(s) Notes Reviewed:      Care Discussed with Consultants/Other Providers:

## 2023-08-24 NOTE — PROGRESS NOTE ADULT - PROBLEM SELECTOR PLAN 1
Multifactorial   Aspiration, acute on chronic diastolic CHF   completed IV abx   Ventilatory  support

## 2023-08-24 NOTE — PROGRESS NOTE ADULT - SUBJECTIVE AND OBJECTIVE BOX
Date of Service: 08-24-23 @ 09:34    Patient is a 73y old  Female who presents with a chief complaint of Respiratory distress (24 Aug 2023 07:44)      Any change in ROS: more awake and alert   and is intubated    MEDICATIONS  (STANDING):  albuterol/ipratropium for Nebulization 3 milliLiter(s) Nebulizer every 8 hours  aspirin  chewable 81 milliGRAM(s) Oral daily  atorvastatin 40 milliGRAM(s) Oral at bedtime  chlorhexidine 0.12% Liquid 15 milliLiter(s) Oral Mucosa every 12 hours  chlorhexidine 2% Cloths 1 Application(s) Topical daily  dexMEDEtomidine Infusion 0.1 MICROgram(s)/kG/Hr (1.66 mL/Hr) IV Continuous <Continuous>  dextrose 5%. 1000 milliLiter(s) (50 mL/Hr) IV Continuous <Continuous>  dextrose 5%. 1000 milliLiter(s) (100 mL/Hr) IV Continuous <Continuous>  dextrose 50% Injectable 12.5 Gram(s) IV Push once  dextrose 50% Injectable 25 Gram(s) IV Push once  dextrose 50% Injectable 25 Gram(s) IV Push once  glucagon  Injectable 1 milliGRAM(s) IntraMuscular once  heparin   Injectable 5000 Unit(s) SubCutaneous every 8 hours  insulin lispro (ADMELOG) corrective regimen sliding scale   SubCutaneous every 6 hours  insulin NPH human recombinant 18 Unit(s) SubCutaneous every 6 hours  labetalol 500 milliGRAM(s) Oral three times a day  predniSONE   Tablet 40 milliGRAM(s) Oral daily    MEDICATIONS  (PRN):  acetaminophen   Oral Liquid .. 650 milliGRAM(s) Oral every 6 hours PRN Temp greater or equal to 38C (100.4F), Mild Pain (1 - 3)  dextrose Oral Gel 15 Gram(s) Oral once PRN Blood Glucose LESS THAN 70 milliGRAM(s)/deciliter    Vital Signs Last 24 Hrs  T(C): 36.1 (24 Aug 2023 08:00), Max: 36.7 (24 Aug 2023 03:00)  T(F): 97 (24 Aug 2023 08:00), Max: 98 (24 Aug 2023 03:00)  HR: 65 (24 Aug 2023 08:00) (59 - 78)  BP: --  BP(mean): --  RR: 24 (24 Aug 2023 08:00) (11 - 24)  SpO2: 100% (24 Aug 2023 08:00) (93% - 100%)    Parameters below as of 24 Aug 2023 08:00  Patient On (Oxygen Delivery Method): cpap 5/10    O2 Concentration (%): 30  Mode: CPAP with PS  FiO2: 30  PEEP: 5  PS: 10  MAP: 8  PIP: 16    I&O's Summary    23 Aug 2023 07:01  -  24 Aug 2023 07:00  --------------------------------------------------------  IN: 832.6 mL / OUT: 740 mL / NET: 92.6 mL    24 Aug 2023 07:01  -  24 Aug 2023 09:34  --------------------------------------------------------  IN: 0 mL / OUT: 45 mL / NET: -45 mL          Physical Exam:   GENERAL: NAD, well-groomed, well-developed  HEENT: MANDY/   Atraumatic, Normocephalic  ENMT: No tonsillar erythema, exudates, or enlargement; Moist mucous membranes, Good dentition, No lesions  NECK: Supple, No JVD, Normal thyroid  CHEST/LUNG: Clear to auscultaion  CVS: Regular rate and rhythm; No murmurs, rubs, or gallops  GI: : Soft, Nontender, Nondistended; Bowel sounds present  NERVOUS SYSTEM:  Alert & awake intubted  EXTREMITIES:  - edema  LYMPH: No lymphadenopathy noted  SKIN: No rashes or lesions  ENDOCRINOLOGY: No Thyromegaly  PSYCH: Appropriate    Labs:  ABG - ( 24 Aug 2023 01:50 )  pH, Arterial: 7.34  pH, Blood: x     /  pCO2: 42    /  pO2: 114   / HCO3: 23    / Base Excess: -2.9  /  SaO2: 99.1                                        7.6    15.83 )-----------( 331      ( 24 Aug 2023 01:50 )             23.7                         7.7    16.96 )-----------( 314      ( 23 Aug 2023 00:05 )             24.0                         8.0    18.73 )-----------( 302      ( 22 Aug 2023 00:35 )             25.4                         8.1    18.57 )-----------( 310      ( 21 Aug 2023 16:52 )             26.1                         7.7    16.20 )-----------( 301      ( 21 Aug 2023 06:50 )             24.5                         6.6    14.78 )-----------( 287      ( 21 Aug 2023 00:30 )             21.2                         6.5    14.89 )-----------( 302      ( 21 Aug 2023 00:15 )             21.0     08-24    135  |  100  |  78<H>  ----------------------------<  186<H>  4.0   |  21<L>  |  2.66<H>  08-23    133<L>  |  99  |  65<H>  ----------------------------<  232<H>  4.1   |  25  |  2.34<H>  08-22    135  |  98  |  47<H>  ----------------------------<  292<H>  3.7   |  25  |  1.93<H>  08-21    136  |  99  |  38<H>  ----------------------------<  334<H>  3.7   |  27  |  1.68<H>  08-21    131<L>  |  97<L>  |  63<H>  ----------------------------<  340<H>  4.4   |  27  |  2.82<H>    Ca    7.9<L>      24 Aug 2023 01:50  Ca    8.3<L>      23 Aug 2023 00:05  Phos  6.8     08-24  Phos  5.9     08-23  Mg     2.40     08-24  Mg     2.40     08-23    TPro  6.2  /  Alb  2.6<L>  /  TBili  0.3  /  DBili  x   /  AST  41<H>  /  ALT  39<H>  /  AlkPhos  164<H>  08-24  TPro  6.1  /  Alb  2.7<L>  /  TBili  0.3  /  DBili  x   /  AST  36<H>  /  ALT  29  /  AlkPhos  159<H>  08-23  TPro  6.3  /  Alb  2.8<L>  /  TBili  0.2  /  DBili  x   /  AST  43<H>  /  ALT  30  /  AlkPhos  210<H>  08-22  TPro  5.9<L>  /  Alb  2.6<L>  /  TBili  0.2  /  DBili  x   /  AST  70<H>  /  ALT  31  /  AlkPhos  203<H>  08-21    CAPILLARY BLOOD GLUCOSE      POCT Blood Glucose.: 211 mg/dL (23 Aug 2023 23:48)  POCT Blood Glucose.: 232 mg/dL (23 Aug 2023 17:32)  POCT Blood Glucose.: 181 mg/dL (23 Aug 2023 11:53)      LIVER FUNCTIONS - ( 24 Aug 2023 01:50 )  Alb: 2.6 g/dL / Pro: 6.2 g/dL / ALK PHOS: 164 U/L / ALT: 39 U/L / AST: 41 U/L / GGT: x           PT/INR - ( 24 Aug 2023 01:50 )   PT: 12.4 sec;   INR: 1.10 ratio         PTT - ( 24 Aug 2023 01:50 )  PTT:25.3 sec  Urinalysis Basic - ( 24 Aug 2023 01:50 )    Color: x / Appearance: x / SG: x / pH: x  Gluc: 186 mg/dL / Ketone: x  / Bili: x / Urobili: x   Blood: x / Protein: x / Nitrite: x   Leuk Esterase: x / RBC: x / WBC x   Sq Epi: x / Non Sq Epi: x / Bacteria: x            RECENT CULTURES:        RESPIRATORY CULTURES:          Studies  Chest X-RAY  CT SCAN Chest   Venous Dopplers: LE:   CT Abdomen  Others      < from: Xray Chest 1 View- PORTABLE-Urgent (Xray Chest 1 View- PORTABLE-Urgent .) (08.19.23 @ 18:37) >  ACC: [id number] EXAM:  XR CHEST PORTABLE URGENT 1V   ORDERED BY: BERESUSAN SANCHEZ     PROCEDURE DATE:  08/19/2023          INTERPRETATION:  EXAMINATION: XR CHEST URGENT    CLINICAL INDICATION: Line Placement    TECHNIQUE: Single frontal, portable view of the chest was obtained.    COMPARISON: Chest x-ray 8/13/2023.    FINDINGS:  Right IJ Shiley in place with catheter tip at the level of the lower SVC.  The heart is normal in size. Cardiac loop recorder in place.  Left jugular central line reaches the upper SVC.  Bilateral pulmonary edema improved from prior study. Right apical pleural   thickening unchanged from prior study.  There is no pneumothorax or pleural effusion.  No acute bony abnormality.    IMPRESSION:  Right IJ Shiley in place with catheter tip at the level of lower SVC.    --- End of Report ---          KEELY JOSE MD; Resident Radiologist  This document has been electronically signed.  KHALIDA JENSEN MD; Attending Interventional Radiologist  This document has been electronically signed. Aug 22 2023 12:29PM    < end of copied text >

## 2023-08-24 NOTE — PROGRESS NOTE ADULT - ASSESSMENT
74 y/o F well [facility] to [facility]. she was admitted at [facility] 7/12-7/22 w aspiration PNA, was treated w CEFEPIME, developed an allergic rash,  dCHF, + MAC on AFB culture, had been progressively getting more and more lethargic and dyspneic at home since DC.   In  am of 8/11/23  ptn presented with respiratory distress w hypoxia and hypercarbia requiring intubation 2/2 volume overload +/- Asp PNA      Neuro   responds appropriately while intubated  Baseline MS AOx3, aphasic   - h/o CVA , on aspirin and statin . resumed w feeding tube, now off ASA in preparation for renal Bx  - eeg  2/2 tremors, no sz focus  - starting to become more responsive   - MRI 8/17:  new R cerebellar infarct, old left PCA/Occipital infarct. probably embolic in nature, did not tolerate full AC in the past, STEPHANIE is neg , no shunts observed      Cardiac   Ptn well known to Dr. Dunn, consult reviewed   CHFpEF   TTE 7/2023 with EF 59%, with severe LVH and diastolic dysfunction   pro-BNP > 62593, trop 78       Pulmonary   Acute hypercapnic and hypoxemic respiratory failure   well known to Dr. Mcnulty, consult reviewed   DDx: aspiration vs volume overload   VBG with respiratory acidosis pCO2 111  CT chest: mod ( worsening) R pl effusion, h/o RUL cavity, +MAC  - cefepime DCed on 8/13, started on Moxifloxacin on 8/13, since 8/14 off ABx    Renal   Hyperkalemia with EKG changes  , initially treated w LOKELMA,  now resolved   Ptn well know to Dr. Colon, consult reviewed      worsening renal function, almost anuric, fluid overloaded, BP elevated, on labetalol, on empiric steroids for AIN ,   started HD 8/19, and 8/21, no HD needed today  bumex recommended   renal biopsy has been deferred , ASA  resumed, AC on hold    GI  NPO for now, on tube feeds  would start GI ppx w IV PPI    Endocrine  T2DM   A1C 7.1 in 7/2023   - BG goal 140-200     ID   No fever/leukocytosis, recheck temp   Hx latent TB which was treated, no concern for TB   - grew MAC on AFB culture from most recent admission. would call ID consult  Started on empiric vancomycin and aztreonam,  changed to cefepime 8/12, cefepime dced on 8/13(cefepime/zosyn allergy.  developed rash when on cefepime on previous admission ) . started on AVALOX on 8/13, off ABx on 8/14. ptn has an allergic rash, prob 2/2 cefepime  - f/u MRSA   - all cxs NTD    Heme/Onc  ppx: heparin q8 hrs     Ethics  GOC - Discussed GOC with daughter and , they have opted in the past for full code. and she remains full code at present

## 2023-08-24 NOTE — PROGRESS NOTE ADULT - SUBJECTIVE AND OBJECTIVE BOX
Subjective: Patient seen and examined. No new events except as noted.   remains intubated in ICU       REVIEW OF SYSTEMS:  Unable to obtain      MEDICATIONS:  MEDICATIONS  (STANDING):  albuterol/ipratropium for Nebulization 3 milliLiter(s) Nebulizer every 8 hours  aspirin  chewable 81 milliGRAM(s) Oral daily  atorvastatin 40 milliGRAM(s) Oral at bedtime  chlorhexidine 0.12% Liquid 15 milliLiter(s) Oral Mucosa every 12 hours  chlorhexidine 2% Cloths 1 Application(s) Topical daily  dexMEDEtomidine Infusion 0.1 MICROgram(s)/kG/Hr (1.66 mL/Hr) IV Continuous <Continuous>  dextrose 5%. 1000 milliLiter(s) (50 mL/Hr) IV Continuous <Continuous>  dextrose 5%. 1000 milliLiter(s) (100 mL/Hr) IV Continuous <Continuous>  dextrose 50% Injectable 25 Gram(s) IV Push once  dextrose 50% Injectable 25 Gram(s) IV Push once  dextrose 50% Injectable 12.5 Gram(s) IV Push once  glucagon  Injectable 1 milliGRAM(s) IntraMuscular once  heparin   Injectable 5000 Unit(s) SubCutaneous every 8 hours  insulin lispro (ADMELOG) corrective regimen sliding scale   SubCutaneous every 6 hours  insulin NPH human recombinant 18 Unit(s) SubCutaneous every 6 hours  labetalol 500 milliGRAM(s) Oral three times a day  predniSONE   Tablet 40 milliGRAM(s) Oral daily      PHYSICAL EXAM:  T(C): 36.1 (08-24-23 @ 08:00), Max: 36.7 (08-24-23 @ 03:00)  HR: 65 (08-24-23 @ 09:00) (59 - 78)  BP: --  RR: 17 (08-24-23 @ 09:00) (11 - 24)  SpO2: 99% (08-24-23 @ 09:00) (93% - 100%)  Wt(kg): --  I&O's Summary    23 Aug 2023 07:01  -  24 Aug 2023 07:00  --------------------------------------------------------  IN: 832.6 mL / OUT: 740 mL / NET: 92.6 mL    24 Aug 2023 07:01  -  24 Aug 2023 09:50  --------------------------------------------------------  IN: 33.2 mL / OUT: 45 mL / NET: -11.8 mL            Appearance: NAD, intubated, arousable/resp[onsive  HEENT: dry oral mucosa, LIJ AMBROCIO   Lymphatic: No lymphadenopathy  Cardiovascular: Normal S1 S2, No JVD, No murmurs, No edema  Respiratory: Decreased BS, intubated on ventilator	  Psychiatry: A & O x 1  Gastrointestinal: Soft, Non-tender, + BS, + OGT	  Skin: No rashes, No ecchymoses, No cyanosis	  Extremities: No strength/ROM 2/2 sedation, + BL LE edema  Vascular: Peripheral pulses palpable 2+ bilaterally  +willard          LABS:    CARDIAC MARKERS:                                7.6    15.83 )-----------( 331      ( 24 Aug 2023 01:50 )             23.7     08-24    135  |  100  |  78<H>  ----------------------------<  186<H>  4.0   |  21<L>  |  2.66<H>    Ca    7.9<L>      24 Aug 2023 01:50  Phos  6.8     08-24  Mg     2.40     08-24    TPro  6.2  /  Alb  2.6<L>  /  TBili  0.3  /  DBili  x   /  AST  41<H>  /  ALT  39<H>  /  AlkPhos  164<H>  08-24    Blood Gas Profile - Arterial (08.24.23 @ 01:50)   pH, Arterial: 7.34  pCO2, Arterial: 42 mmHg  pO2, Arterial: 114 mmHg  HCO3, Arterial: 23 mmol/L  Base Excess, Arterial: -2.9 mmol/L  Oxygen Saturation, Arterial: 99.1 %  Total CO2, Arterial: 24 mmol/L  FIO2, Arterial: 30Blood Gas Arterial, Lactate: 0.9 mmol/L (08.24.23 @ 01:50)         TELEMETRY: SR	    ECG:  	  RADIOLOGY:   DIAGNOSTIC TESTING:  [ ] Echocardiogram:  [ ]  Catheterization:  [ ] Stress Test:    OTHER:

## 2023-08-25 NOTE — PROGRESS NOTE ADULT - SUBJECTIVE AND OBJECTIVE BOX
Date of Service: 08-25-23 @ 11:58    Patient is a 73y old  Female who presents with a chief complaint of Respiratory distress (25 Aug 2023 10:51)      Any change in ROS: Intubated and is alert and awake:  not able to weaned      MEDICATIONS  (STANDING):  albuterol/ipratropium for Nebulization 3 milliLiter(s) Nebulizer every 8 hours  atorvastatin 40 milliGRAM(s) Oral at bedtime  buMETAnide Infusion 1 mG/Hr (5 mL/Hr) IV Continuous <Continuous>  chlorhexidine 0.12% Liquid 15 milliLiter(s) Oral Mucosa every 12 hours  chlorhexidine 2% Cloths 1 Application(s) Topical daily  cloNIDine 0.1 milliGRAM(s) Oral every 8 hours  dexMEDEtomidine Infusion 0.1 MICROgram(s)/kG/Hr (1.66 mL/Hr) IV Continuous <Continuous>  dextrose 5%. 1000 milliLiter(s) (100 mL/Hr) IV Continuous <Continuous>  dextrose 5%. 1000 milliLiter(s) (50 mL/Hr) IV Continuous <Continuous>  dextrose 50% Injectable 12.5 Gram(s) IV Push once  dextrose 50% Injectable 25 Gram(s) IV Push once  dextrose 50% Injectable 25 Gram(s) IV Push once  glucagon  Injectable 1 milliGRAM(s) IntraMuscular once  heparin   Injectable 5000 Unit(s) SubCutaneous every 8 hours  insulin lispro (ADMELOG) corrective regimen sliding scale   SubCutaneous every 6 hours  labetalol 600 milliGRAM(s) Oral three times a day  niCARdipine Infusion 5 mG/Hr (25 mL/Hr) IV Continuous <Continuous>  pantoprazole  Injectable 40 milliGRAM(s) IV Push two times a day  predniSONE   Tablet 40 milliGRAM(s) Oral daily  sevelamer carbonate 1600 milliGRAM(s) Oral every 8 hours    MEDICATIONS  (PRN):  acetaminophen   Oral Liquid .. 650 milliGRAM(s) Oral every 6 hours PRN Temp greater or equal to 38C (100.4F), Mild Pain (1 - 3)  dextrose Oral Gel 15 Gram(s) Oral once PRN Blood Glucose LESS THAN 70 milliGRAM(s)/deciliter    Vital Signs Last 24 Hrs  T(C): 36.6 (25 Aug 2023 08:00), Max: 37.1 (25 Aug 2023 00:00)  T(F): 97.8 (25 Aug 2023 08:00), Max: 98.8 (25 Aug 2023 00:00)  HR: 65 (25 Aug 2023 10:50) (58 - 86)  BP: --  BP(mean): --  RR: 16 (25 Aug 2023 10:00) (11 - 30)  SpO2: 100% (25 Aug 2023 10:50) (97% - 100%)    Parameters below as of 25 Aug 2023 10:00  Patient On (Oxygen Delivery Method): ventilator    O2 Concentration (%): 30  Mode: AC/ CMV (Assist Control/ Continuous Mandatory Ventilation)  RR (machine): 12  TV (machine): 360  FiO2: 30  PEEP: 5  ITime: 0.82  MAP: 9  PIP: 30    I&O's Summary    24 Aug 2023 07:01  -  25 Aug 2023 07:00  --------------------------------------------------------  IN: 265.8 mL / OUT: 625 mL / NET: -359.2 mL    25 Aug 2023 07:01  -  25 Aug 2023 11:58  --------------------------------------------------------  IN: 16.6 mL / OUT: 90 mL / NET: -73.4 mL          Physical Exam:   GENERAL: NAD, well-groomed, well-developed  HEENT: MANDY/   Atraumatic, Normocephalic  ENMT: No tonsillar erythema, exudates, or enlargement; Moist mucous membranes, Good dentition, No lesions  NECK: Supple, No JVD, Normal thyroid  CHEST/LUNG: Clear to auscultaion  CVS: Regular rate and rhythm; No murmurs, rubs, or gallops  GI: : Soft, Nontender, Nondistended; Bowel sounds present  NERVOUS SYSTEM:  Alert & Oriented X3  EXTREMITIES:  - + edema  LYMPH: No lymphadenopathy noted  SKIN: No rashes or lesions  ENDOCRINOLOGY: No Thyromegaly  PSYCH: Appropriate    Labs:  ABG - ( 25 Aug 2023 01:30 )  pH, Arterial: 7.35  pH, Blood: x     /  pCO2: 39    /  pO2: 121   / HCO3: 22    / Base Excess: -3.8  /  SaO2: 100.0     < from: Xray Chest 1 View- PORTABLE-Urgent (Xray Chest 1 View- PORTABLE-Urgent .) (08.24.23 @ 18:23) >    ACC: [id number] EXAM:  XR CHEST PORTABLE URGENT 1V   ORDERED BY: SARAH DEAL     PROCEDURE DATE:  08/24/2023          INTERPRETATION:  CLINICAL INFORMATION: New onset coffee-ground emesis.    TECHNIQUE: One view of the chest.    COMPARISON: Radiograph chest 8/19/2023.    FINDINGS:  Extremely limited evaluation secondary to patient positioning.    Enteric tube is present.    IMPRESSION:  On this limited study, an enteric tube is present.    --- End of Report ---          CHANCE BLACKBURN MD; Resident Radiologist  This document has been electronically signed.  JERRI FUNK DO; Attending Radiologist  This document has been electronically signed. Aug 25 2023 10:26AM    < end of copied text >                                    7.8    14.96 )-----------( 345      ( 25 Aug 2023 01:30 )             24.2                         7.5    18.82 )-----------( 331      ( 24 Aug 2023 15:20 )             24.0                         7.6    15.83 )-----------( 331      ( 24 Aug 2023 01:50 )             23.7                         7.7    16.96 )-----------( 314      ( 23 Aug 2023 00:05 )             24.0                         8.0    18.73 )-----------( 302      ( 22 Aug 2023 00:35 )             25.4                         8.1    18.57 )-----------( 310      ( 21 Aug 2023 16:52 )             26.1     08-25    136  |  99  |  87<H>  ----------------------------<  144<H>  4.4   |  22  |  2.97<H>  08-24    135  |  100  |  80<H>  ----------------------------<  144<H>  4.3   |  22  |  2.82<H>  08-24    135  |  100  |  78<H>  ----------------------------<  186<H>  4.0   |  21<L>  |  2.66<H>  08-23    133<L>  |  99  |  65<H>  ----------------------------<  232<H>  4.1   |  25  |  2.34<H>  08-22    135  |  98  |  47<H>  ----------------------------<  292<H>  3.7   |  25  |  1.93<H>  08-21    136  |  99  |  38<H>  ----------------------------<  334<H>  3.7   |  27  |  1.68<H>    Ca    7.6<L>      25 Aug 2023 01:30  Ca    7.7<L>      24 Aug 2023 15:20  Ca    7.9<L>      24 Aug 2023 01:50  Phos  7.6     08-25  Phos  7.7     08-24  Phos  6.8     08-24  Mg     2.30     08-25  Mg     2.30     08-24  Mg     2.40     08-24    TPro  6.0  /  Alb  2.7<L>  /  TBili  0.3  /  DBili  x   /  AST  36<H>  /  ALT  43<H>  /  AlkPhos  164<H>  08-25  TPro  6.1  /  Alb  2.5<L>  /  TBili  0.3  /  DBili  x   /  AST  59<H>  /  ALT  51<H>  /  AlkPhos  176<H>  08-24  TPro  6.2  /  Alb  2.6<L>  /  TBili  0.3  /  DBili  x   /  AST  41<H>  /  ALT  39<H>  /  AlkPhos  164<H>  08-24  TPro  6.1  /  Alb  2.7<L>  /  TBili  0.3  /  DBili  x   /  AST  36<H>  /  ALT  29  /  AlkPhos  159<H>  08-23  TPro  6.3  /  Alb  2.8<L>  /  TBili  0.2  /  DBili  x   /  AST  43<H>  /  ALT  30  /  AlkPhos  210<H>  08-22    CAPILLARY BLOOD GLUCOSE      POCT Blood Glucose.: 140 mg/dL (25 Aug 2023 05:26)  POCT Blood Glucose.: 132 mg/dL (24 Aug 2023 23:59)  POCT Blood Glucose.: 154 mg/dL (24 Aug 2023 17:59)  POCT Blood Glucose.: 127 mg/dL (24 Aug 2023 12:11)      LIVER FUNCTIONS - ( 25 Aug 2023 01:30 )  Alb: 2.7 g/dL / Pro: 6.0 g/dL / ALK PHOS: 164 U/L / ALT: 43 U/L / AST: 36 U/L / GGT: x           PT/INR - ( 25 Aug 2023 01:30 )   PT: 12.4 sec;   INR: 1.10 ratio         PTT - ( 24 Aug 2023 16:40 )  PTT:28.8 sec  Urinalysis Basic - ( 25 Aug 2023 01:30 )    Color: x / Appearance: x / SG: x / pH: x  Gluc: 144 mg/dL / Ketone: x  / Bili: x / Urobili: x   Blood: x / Protein: x / Nitrite: x   Leuk Esterase: x / RBC: x / WBC x   Sq Epi: x / Non Sq Epi: x / Bacteria: x            RECENT CULTURES:        RESPIRATORY CULTURES:          Studies  Chest X-RAY  CT SCAN Chest   Venous Dopplers: LE:   CT Abdomen  Others

## 2023-08-25 NOTE — PROGRESS NOTE ADULT - ASSESSMENT
74 y/o F well known to me from [facility]. she was admitted at [facility] 7/12-7/22 w aspiration PNA, was treated w CEFEPIME, developed an allergic rash,  dCHF, + MAC on AFB culture, had been progressively getting more and more lethargic and dyspneic at home since DC.   In  am of 8/11/23  ptn presented with respiratory distress w hypoxia and hypercarbia requiring intubation 2/2 volume overload +/- Asp PNA      Neuro   responds appropriately while intubated  Baseline MS AOx3, aphasic   - h/o CVA , on aspirin and statin . resumed w feeding tube, off ASA  - eeg  2/2 tremors, no sz focus  - starting to become more responsive   - MRI 8/17:  new R cerebellar infarct, old left PCA/Occipital infarct. probably embolic in nature, did not tolerate full AC in the past, STEPHANIE is neg , no shunts observed      Cardiac   Ptn well known to Dr. Dunn, consult reviewed   CHFpEF   TTE 7/2023 with EF 59%, with severe LVH and diastolic dysfunction   pro-BNP > 67419, trop 78   on cardine drip for bp control and labetalol po tid 600 mg, clonidine,     Pulmonary   Acute hypercapnic and hypoxemic respiratory failure   well known to Dr. Mcnulty, consult reviewed   DDx: aspiration vs volume overload   VBG with respiratory acidosis pCO2 111  CT chest: mod ( worsening) R pl effusion, h/o RUL cavity, +MAC  - cefepime DCed on 8/13, started on Moxifloxacin on 8/13, since 8/14 off ABx    Renal   Hyperkalemia with EKG changes  , initially treated w LOKELMA,  now resolved   Ptn well know to Dr. Colon, consult reviewed      worsening renal function, almost anuric, fluid overloaded, BP elevated, on labetalol, on empiric steroids for AIN ,   started HD 8/19, and 8/21, no HD needed today, plan for 8/26  bumex  drip resumed  renal biopsy has been deferred , ASA  resumed, AC on hold    GI  NPO for now, on tube feeds  coffee ground emesis, now on IV PPI,     Endocrine  T2DM   A1C 7.1 in 7/2023   - BG goal 140-200     ID   No fever/leukocytosis, recheck temp   Hx latent TB which was treated, no concern for TB   - grew MAC on AFB culture from most recent admission. would call ID consult  Started on empiric vancomycin and aztreonam,  changed to cefepime 8/12, cefepime dced on 8/13(cefepime/zosyn allergy.  developed rash when on cefepime on previous admission ) . started on AVALOX on 8/13, off ABx on 8/14. ptn has an allergic rash, prob 2/2 cefepime  - f/u MRSA   - all cxs NTD    Heme/Onc  ppx: heparin q8 hrs     Ethics  GOC - Discussed GOC with daughter and , they have opted in the past for full code. and she remains full code at present

## 2023-08-25 NOTE — PROGRESS NOTE ADULT - SUBJECTIVE AND OBJECTIVE BOX
Subjective: Patient seen and examined. No new events except as noted.   remains intubated in ICU   Remains on vent/remains sedated on Precedex  REVIEW OF SYSTEMS:  unable to obtain     MEDICATIONS:  MEDICATIONS  (STANDING):  albuterol/ipratropium for Nebulization 3 milliLiter(s) Nebulizer every 8 hours  atorvastatin 40 milliGRAM(s) Oral at bedtime  chlorhexidine 0.12% Liquid 15 milliLiter(s) Oral Mucosa every 12 hours  chlorhexidine 2% Cloths 1 Application(s) Topical daily  dexMEDEtomidine Infusion 0.1 MICROgram(s)/kG/Hr (1.66 mL/Hr) IV Continuous <Continuous>  dextrose 5%. 1000 milliLiter(s) (50 mL/Hr) IV Continuous <Continuous>  dextrose 5%. 1000 milliLiter(s) (100 mL/Hr) IV Continuous <Continuous>  dextrose 50% Injectable 12.5 Gram(s) IV Push once  dextrose 50% Injectable 25 Gram(s) IV Push once  dextrose 50% Injectable 25 Gram(s) IV Push once  glucagon  Injectable 1 milliGRAM(s) IntraMuscular once  heparin   Injectable 5000 Unit(s) SubCutaneous every 8 hours  insulin lispro (ADMELOG) corrective regimen sliding scale   SubCutaneous every 6 hours  insulin NPH human recombinant 18 Unit(s) SubCutaneous every 6 hours  labetalol 600 milliGRAM(s) Oral three times a day  pantoprazole  Injectable 40 milliGRAM(s) IV Push two times a day  predniSONE   Tablet 40 milliGRAM(s) Oral daily      PHYSICAL EXAM:  T(C): 37.1 (08-25-23 @ 04:00), Max: 37.1 (08-25-23 @ 00:00)  HR: 66 (08-25-23 @ 06:53) (61 - 86)  BP: --  RR: 11 (08-25-23 @ 06:53) (11 - 30)  SpO2: 100% (08-25-23 @ 06:53) (97% - 100%)  Wt(kg): --  I&O's Summary    24 Aug 2023 07:01  -  25 Aug 2023 07:00  --------------------------------------------------------  IN: 265.8 mL / OUT: 625 mL / NET: -359.2 mL              Appearance: NAD, intubated, arousable/resp[onsive  HEENT: dry oral mucosa, LIJ AMBROCIO   Lymphatic: No lymphadenopathy  Cardiovascular: Normal S1 S2, No JVD, No murmurs, No edema  Respiratory: Decreased BS, intubated on ventilator	  Psychiatry: A & O x 1  Gastrointestinal: Soft, Non-tender, + BS, + OGT	  Skin: No rashes, No ecchymoses, No cyanosis	  Extremities: No strength/ROM 2/2 sedation, + BL LE edema  Vascular: Peripheral pulses palpable 2+ bilaterally  +willard          LABS:    CARDIAC MARKERS:                                7.8    14.96 )-----------( 345      ( 25 Aug 2023 01:30 )             24.2     08-25    136  |  99  |  87<H>  ----------------------------<  144<H>  4.4   |  22  |  2.97<H>    Ca    7.6<L>      25 Aug 2023 01:30  Phos  7.6     08-25  Mg     2.30     08-25    TPro  6.0  /  Alb  2.7<L>  /  TBili  0.3  /  DBili  x   /  AST  36<H>  /  ALT  43<H>  /  AlkPhos  164<H>  08-25    proBNP:   Lipid Profile:   HgA1c:   TSH:             TELEMETRY: 	    ECG:  	  RADIOLOGY:   DIAGNOSTIC TESTING:  [ ] Echocardiogram:  [ ]  Catheterization:  [ ] Stress Test:    OTHER:

## 2023-08-25 NOTE — CONSULT NOTE ADULT - ASSESSMENT
# Coffee ground emesis  Episode of coffee ground emesis occurred after multiple episodes of vomiting/retching while intubated with OGT; no associated melena/hematochezia with yellow-tan stool on ISMAEL and when moves her bowels. Episode likely due to erosive esophagitis or minor aravind littlejohn tear given stability without profuse bleeding vs OGT trauma, PUD, erosive gastropathy. Note, given reports of oral suctioning with respiratory therapy and intermittent episodes of nosebleeding (per family, though not noted inpatient), may also have swallowed blood from the oropharyngeal cavity. In either case, patient is not medically optimized for a non-urgent endoscopic evaluation and family as well would like to hold off on invasive interventions unless it should become more emergent given her age, condition, and comorbidities.     Recommendations:  - No plan for non-urgent endoscopic evaluation and in accordance with family's wishes with pursue medical management unless becomes more emergent  - PPI 40 IV BID, then transition to PO BID when extubated   - Daily CBC, CMP, coags  - Transfuse if hgb < 7  - Ensure adequate hydration  - Okay to resume TFs if no additional episodes of bleeding and hgb stable  - Antiemetics as needed  - If becomes hemodynamically unstable with overt bleeding obtain stat CTA and consider IR consult; inform GI   - Rest of care per primary    Preliminary note until signed by Attending.    Thank you for involving us in this patient's care. Please reach out if any further questions or concerns. Signing off.    Beth Acosta MD  Gastroenterology/Hepatology Fellow, PGY-7    NON-URGENT CONSULTS:  Please email [email or web address]  [email or web address]  AT NIGHT AND ON WEEKENDS:  Contact on-call GI fellow via answering service ([phone]) from 5pm-8am and on weekends/holidays  MONDAY-FRIDAY 8AM-5PM:  Pager: [phone] ([facility])  Pager: [phone] ([facility])   # Coffee ground emesis  Episode of coffee ground emesis occurred after multiple episodes of vomiting/retching while intubated with OGT; no associated melena/hematochezia with yellow-tan stool on ISMAEL and when moves her bowels. Episode likely due to erosive esophagitis or minor aravind littlejohn tear given stability without profuse bleeding vs OGT trauma, PUD, erosive gastropathy. Note, given reports of oral suctioning with respiratory therapy and intermittent episodes of nosebleeding (per family, though not noted inpatient), may also have swallowed blood from the oropharyngeal cavity. In either case, patient is not medically optimized for a non-urgent endoscopic evaluation and family as well would like to hold off on invasive interventions unless it should become more emergent given her age, condition, and comorbidities.     Recommendations:  - No plan for non-urgent endoscopic evaluation given patient's condition and in accordance with family's wishes with pursue medical management unless becomes more emergent  - PPI 40 IV BID, then transition to PO BID when extubated   - Daily CBC, CMP, coags  - Transfuse if hgb < 7  - Ensure adequate hydration  - Okay to resume TFs if no additional episodes of bleeding and hgb stable  - Antiemetics as needed  - If becomes hemodynamically unstable with overt bleeding obtain stat CTA and consider IR consult; inform GI   - Evaluate abdomen for possible hematoma on RL abdomen near ecchymosis, possible site of blood loss  - Rest of care per primary    Preliminary note until signed by Attending.    Thank you for involving us in this patient's care. Please reach out if any further questions or concerns. Signing off.    Beth Acosta MD  Gastroenterology/Hepatology Fellow, PGY-7    NON-URGENT CONSULTS:  Please email [email or web address]  [email or web address]  AT NIGHT AND ON WEEKENDS:  Contact on-call GI fellow via answering service ([phone]) from 5pm-8am and on weekends/holidays  MONDAY-FRIDAY 8AM-5PM:  Pager: [phone] ([facility])  Pager: [phone] ([facility])

## 2023-08-25 NOTE — PROGRESS NOTE ADULT - SUBJECTIVE AND OBJECTIVE BOX
Patient is a 73y old  Female who presents with a chief complaint of Respiratory distress (25 Aug 2023 11:58)      SUBJECTIVE / OVERNIGHT EVENTS: coffee ground emesis, now on IV PPI, remains intubated, on BUMEX drip, oliguric, plan for HD on 8/26 most likely, on cardine drip for bp control and labetalol po tid 600 mg, clonidine,     MEDICATIONS  (STANDING):  albuterol/ipratropium for Nebulization 3 milliLiter(s) Nebulizer every 8 hours  atorvastatin 40 milliGRAM(s) Oral at bedtime  buMETAnide Infusion 2 mG/Hr (10 mL/Hr) IV Continuous <Continuous>  chlorhexidine 0.12% Liquid 15 milliLiter(s) Oral Mucosa every 12 hours  chlorhexidine 2% Cloths 1 Application(s) Topical daily  cloNIDine 0.1 milliGRAM(s) Oral every 8 hours  dexMEDEtomidine Infusion 1 MICROgram(s)/kG/Hr (16.6 mL/Hr) IV Continuous <Continuous>  dextrose 5%. 1000 milliLiter(s) (50 mL/Hr) IV Continuous <Continuous>  dextrose 5%. 1000 milliLiter(s) (100 mL/Hr) IV Continuous <Continuous>  dextrose 50% Injectable 12.5 Gram(s) IV Push once  dextrose 50% Injectable 25 Gram(s) IV Push once  dextrose 50% Injectable 25 Gram(s) IV Push once  glucagon  Injectable 1 milliGRAM(s) IntraMuscular once  heparin   Injectable 5000 Unit(s) SubCutaneous every 8 hours  insulin lispro (ADMELOG) corrective regimen sliding scale   SubCutaneous every 6 hours  labetalol 600 milliGRAM(s) Oral three times a day  niCARdipine Infusion 2.5 mG/Hr (12.5 mL/Hr) IV Continuous <Continuous>  pantoprazole  Injectable 40 milliGRAM(s) IV Push two times a day  predniSONE   Tablet 40 milliGRAM(s) Oral daily  sevelamer carbonate 1600 milliGRAM(s) Oral every 8 hours    MEDICATIONS  (PRN):  acetaminophen   Oral Liquid .. 650 milliGRAM(s) Oral every 6 hours PRN Temp greater or equal to 38C (100.4F), Mild Pain (1 - 3)  dextrose Oral Gel 15 Gram(s) Oral once PRN Blood Glucose LESS THAN 70 milliGRAM(s)/deciliter      Vital Signs Last 24 Hrs  T(F): 98 (08-25-23 @ 20:00), Max: 98.8 (08-25-23 @ 00:00)  HR: 69 (08-25-23 @ 23:00) (58 - 78)  BP: --  RR: 12 (08-25-23 @ 23:00) (11 - 21)  SpO2: 100% (08-25-23 @ 23:00) (100% - 100%)  Telemetry:   CAPILLARY BLOOD GLUCOSE      POCT Blood Glucose.: 149 mg/dL (25 Aug 2023 18:33)  POCT Blood Glucose.: 167 mg/dL (25 Aug 2023 12:29)  POCT Blood Glucose.: 140 mg/dL (25 Aug 2023 05:26)  POCT Blood Glucose.: 132 mg/dL (24 Aug 2023 23:59)    I&O's Summary    24 Aug 2023 07:01  -  25 Aug 2023 07:00  --------------------------------------------------------  IN: 265.8 mL / OUT: 625 mL / NET: -359.2 mL    25 Aug 2023 07:01  -  25 Aug 2023 23:30  --------------------------------------------------------  IN: 668.6 mL / OUT: 885 mL / NET: -216.4 mL        PHYSICAL EXAM:  GENERAL: NAD, well-developed  HEAD:  Atraumatic, Normocephalic  EYES: EOMI, PERRLA, conjunctiva and sclera clear  NECK: Supple, No JVD  CHEST/LUNG: Clear to auscultation bilaterally; No wheeze  HEART: Regular rate and rhythm; No murmurs, rubs, or gallops  ABDOMEN: Soft, Nontender, Nondistended; Bowel sounds present  EXTREMITIES:  2+ Peripheral Pulses, No clubbing, cyanosis, or edema  PSYCH: AAOx3  NEUROLOGY: non-focal  SKIN: No rashes or lesions    LABS:                        7.8    14.96 )-----------( 345      ( 25 Aug 2023 01:30 )             24.2     08-25    134<L>  |  99  |  93<H>  ----------------------------<  155<H>  4.5   |  19<L>  |  3.08<H>    Ca    7.5<L>      25 Aug 2023 15:30  Phos  7.6     08-25  Mg     2.30     08-25    TPro  6.0  /  Alb  2.7<L>  /  TBili  0.3  /  DBili  x   /  AST  36<H>  /  ALT  43<H>  /  AlkPhos  164<H>  08-25    PT/INR - ( 25 Aug 2023 01:30 )   PT: 12.4 sec;   INR: 1.10 ratio         PTT - ( 24 Aug 2023 16:40 )  PTT:28.8 sec      Urinalysis Basic - ( 25 Aug 2023 15:30 )    Color: x / Appearance: x / SG: x / pH: x  Gluc: 155 mg/dL / Ketone: x  / Bili: x / Urobili: x   Blood: x / Protein: x / Nitrite: x   Leuk Esterase: x / RBC: x / WBC x   Sq Epi: x / Non Sq Epi: x / Bacteria: x        RADIOLOGY & ADDITIONAL TESTS:    Imaging Personally Reviewed:    Consultant(s) Notes Reviewed:      Care Discussed with Consultants/Other Providers:

## 2023-08-25 NOTE — DIETITIAN INITIAL EVALUATION ADULT - ENTERAL
Nepro with Carb Steady @ 30 mL/hr x 24 hrs with No Carb Prosource x 1/day  Please order Nephro-Katherine multivitamin to ensure complete micronutrient coverage
No

## 2023-08-25 NOTE — PROGRESS NOTE ADULT - ASSESSMENT
72 y/o F DM on insulin, HTN, CKD, CHFpEF, breast CA, respiratory arrest and cardiac arrest (2018), CVA with residual weakness, aspiration PNA h/o trache, PEG, both removed presents with respiratory distress requiring intubation. Found to have elevated BNP, may be 2/2 to volume overload i/s/o CKD vs CHF.     Neuro ;  - Sedated : Baseline MS AOx3   -WAS ON PROPOFOL AND FENTANYL BEFORE: NOW OFF:    - Monitor her mental status:  requiring only 30% fio2:    -8/15: - now she has been off sedation for more then 24 hours but is still lethargic: her o2 requirement has not increased:   -for possible extubation if she wakes up   :: -dw PMD: pt is still lethargic  for MRI brain stem today   ": -had ct head: OK: awaiting MRI brain : still lethargic  : seemed same to me: family at bedside who says she is little  bit more awake: oxygen requirement is same:  at 30%:off vasopressors   -now neuro note reviewed:  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 reason for inability ot trigger vent and co2 retention initially ? candidate for trach    -now the co2 i s normal : not much improvement in her mental statis:  off sedation for days   /: still lethargic: on precedex;  cxr reviewed:  last time at [facility] she was found to have cavity in RUL : she was ruled out for tb : her AFB was positive but was MAC /; defer to MICU   "  still sedated:  no sob:  on 30% fio2:  on precedex:  and is on nicardipine as well as labetalol ; for renal biopsy today  : seems OK: more alert and awake:  renal biopsy is still pending:  dw family   : doing OK: alert and awake:  but still intubated:  getting cpap : no renal biopsy done   : notable to be weaned:  probably trach next week if sheisnot extubated by early next week : restarted on bumex  CVA   - cont aspirin and statin   //-per neurology   : sedated  neuro followin/23:edation:  seems to be doing better:  plan for extubation if family refused for biopsy   : more awake and alert :   : stable  Cardiac   -CHFpEF : TTE 2023 with EF 59%, with severe LVH and diastolic dysfunction : pro-BNP > 89075, trop 78   -on bumex drip :  -cards following   8/15: : she is off bumex drip and is on midodrine   : remains off bumex  :: off bumex:  defer to MICU team   cont to remain off diuretics   : she seems same to me: in renal failure off diuretics   : per Micu   : seems stable  : back  on bumex drip    Pulmonary   -Acute hypercapnic and hypoxemic respiratory failure   -DDx: aspiration vs volume overload  VBG with respiratory acidosis pCO2 111  - CXR with small right pleural effusion, no consolidations/opacities  -now she seems better:  fio2: 30$:  -? etiology of hypercarbia:  multifactorial : seems to have OHS and TYRONE superimposed by acute chf  ? and aspiration pneumonia:"   -pt is mostly bed bound:   -it is possible that this time:  she mght need bipap on dc : atleast at the night time:   -on 8/15:  still remains intubated  :still lethargic:  not extubated hypercarbic acidotic today on abg:  weaning trials :probably would need bipap following extubation  : :still lethargic: :awaiting mri brain : dw    : :MRI done has new right cerebellar infarct on mri : defer to MICU   -seen by bora now:  has brain stem infarct to per neuro note:  reason for being vent;    : awaiting renal bipsy today  : and then aggressive weaning  : now no renal biopsy :    /Renal   -Hyperkalemia with EKG changes  : 7.2, hemolyzed, given insulin and D50 + calcium gluc   -K is better today : cont to monitor  -normal today on 8/15   8/16:stable  :-k has been ok for l ast few days  : stable  : started on prednisone yesterday for suspected AIN by renal    : wbc is high : likely secondary to steroids  she has no fveR:  ruled out for tb last time   : for renal biopsy today   : RENAL  BIOPSY NOT DONE:  FAMILY IS THINKING about itl   : biopsy not done: urine output increased:   : doing  ok : no sob:  no biopsy for now:   GI NPO for now  Endocrine  T2DM : A1C 7.1 in 2023   -cont to monitor blood glucose  ID   - No fever/leukocytosis  - Hx latent TB which was treated, no concern for TB  : She was recently ruled out for tuberculosis at [facility]   - Started on empiric vancomycin and aztreonam (cefepime/zosyn allergy? developed rash req prednisone)  : now dced:   defer to MICU   - f/u MRSA   - follow up blood culture/urine   :blood cultures negative so far: legionella is negative:  remains off antibiotics  staph aureus on sputum   :: off antibiotics at this time: secondary to drug eruption  : remains off antibiotics   -: off diuretics   : off antibiotics   : off antibiotics   : off antibiotics   : off antibtiocs  : remains afebrile:     dw micu and  at bedside as well as MICU attending   overall prognosis seems guarded

## 2023-08-25 NOTE — CONSULT NOTE ADULT - SUBJECTIVE AND OBJECTIVE BOX
Chief Complaint:  Patient is a 73y old  Female who presents with a chief complaint of Respiratory distress (25 Aug 2023 11:58)      HPI:MILANA BALL is a 73y Female    PMHX/PSHX:  Breast CA    Diabetes    Stroke    Cardiac arrest    HTN (hypertension)    No significant past surgical history    H/O mastectomy, bilateral      Allergies:  isoniazid (Rash)  nafcillin (Unknown)  hydrALAZINE (Rash)  vitamin E (Short breath; Urticaria; Hives)  doxycycline (Rash)  cefepime (Rash)  NIFEdipine (Urticaria; Hives)      Home Medications: reviewed  Hospital Medications:  acetaminophen   Oral Liquid .. 650 milliGRAM(s) Oral every 6 hours PRN  albuterol/ipratropium for Nebulization 3 milliLiter(s) Nebulizer every 8 hours  atorvastatin 40 milliGRAM(s) Oral at bedtime  buMETAnide Infusion 1 mG/Hr IV Continuous <Continuous>  chlorhexidine 0.12% Liquid 15 milliLiter(s) Oral Mucosa every 12 hours  chlorhexidine 2% Cloths 1 Application(s) Topical daily  cloNIDine 0.1 milliGRAM(s) Oral every 8 hours  dexMEDEtomidine Infusion 0.1 MICROgram(s)/kG/Hr IV Continuous <Continuous>  dextrose 5%. 1000 milliLiter(s) IV Continuous <Continuous>  dextrose 5%. 1000 milliLiter(s) IV Continuous <Continuous>  dextrose 50% Injectable 12.5 Gram(s) IV Push once  dextrose 50% Injectable 25 Gram(s) IV Push once  dextrose 50% Injectable 25 Gram(s) IV Push once  dextrose Oral Gel 15 Gram(s) Oral once PRN  glucagon  Injectable 1 milliGRAM(s) IntraMuscular once  heparin   Injectable 5000 Unit(s) SubCutaneous every 8 hours  insulin lispro (ADMELOG) corrective regimen sliding scale   SubCutaneous every 6 hours  labetalol 600 milliGRAM(s) Oral three times a day  niCARdipine Infusion 5 mG/Hr IV Continuous <Continuous>  pantoprazole  Injectable 40 milliGRAM(s) IV Push two times a day  predniSONE   Tablet 40 milliGRAM(s) Oral daily  sevelamer carbonate 1600 milliGRAM(s) Oral every 8 hours      Social History:   Tob: Denies  EtOH: Denies  Illicit Drugs: Denies    Family history:  No pertinent family history in first degree relatives      Denies family history of colon cancer/polyps, stomach cancer/polyps, pancreatic cancer/masses, liver cancer/disease, ovarian cancer and endometrial cancer.    ROS:   General:  No  fevers, chills, night sweats, fatigue  Eyes:  Good vision, no reported pain  ENT:  No sore throat, pain, runny nose  CV:  No pain, palpitations  Pulm:  No dyspnea, cough  GI:  See HPI, otherwise negative  :  No  incontinence, nocturia  Muscle:  No pain, weakness  Neuro:  No memory problems  Psych:  No insomnia, mood problems, depression  Endocrine:  No polyuria, polydipsia, cold/heat intolerance  Heme:  No petechiae, ecchymosis, easy bruisability  Skin:  No rash    PHYSICAL EXAM:   Vital Signs:  Vital Signs Last 24 Hrs  T(C): 36.6 (25 Aug 2023 08:00), Max: 37.1 (25 Aug 2023 00:00)  T(F): 97.8 (25 Aug 2023 08:00), Max: 98.8 (25 Aug 2023 00:00)  HR: 65 (25 Aug 2023 10:50) (58 - 86)  BP: --  BP(mean): --  RR: 16 (25 Aug 2023 10:00) (11 - 30)  SpO2: 100% (25 Aug 2023 10:50) (97% - 100%)    Parameters below as of 25 Aug 2023 10:00  Patient On (Oxygen Delivery Method): ventilator    O2 Concentration (%): 30  Daily     Daily Weight in k.8 (25 Aug 2023 04:00)    GENERAL: no acute distress  NEURO: alert  HEENT: anicteric sclera, no conjunctival pallor appreciated  CHEST: no respiratory distress, no accessory muscle use  CARDIAC: regular rate, rhythm  ABDOMEN: soft, non-tender, non-distended, no rebound or guarding  EXTREMITIES: warm, well perfused, no edema  SKIN: no lesions noted    LABS: reviewed                        7.8    14.96 )-----------( 345      ( 25 Aug 2023 01:30 )             24.2     08-25    136  |  99  |  87<H>  ----------------------------<  144<H>  4.4   |  22  |  2.97<H>    Ca    7.6<L>      25 Aug 2023 01:30  Phos  7.6     08-25  Mg     2.30     08-25    TPro  6.0  /  Alb  2.7<L>  /  TBili  0.3  /  DBili  x   /  AST  36<H>  /  ALT  43<H>  /  AlkPhos  164<H>  08-    LIVER FUNCTIONS - ( 25 Aug 2023 01:30 )  Alb: 2.7 g/dL / Pro: 6.0 g/dL / ALK PHOS: 164 U/L / ALT: 43 U/L / AST: 36 U/L / GGT: x               Diagnostic Studies: see sunrise for full report         Chief Complaint:  Patient is a 73y old  Female who presents with a chief complaint of Respiratory distress (25 Aug 2023 11:58)      HPI:MILANA BALL is a 73y Female with intermittent episodes of epistaxis, DM on insulin, HTN, CKD, CHFpEF, breast CA s/p mastectomy, Latent Tb, respiratory failure and cardiac arrest (2018), CVA with residual weakness, aspiration PNA h/o trach and prior PEG for dysphagia in 2018, both removed who initially presented to the ED with respiratory distress requiring intubation and remains intubated, PNA-Staph aureus, OGT placed with worsening renal function and concerns for ATN vs AIN, started on prednisone 40 on  w/o PPI, with red blood noted in oral suctioning with respiratory therapy on ; had multiple episodes of NBNB vomiting on Wednesday/Thursday per family, then yesterday had one episode of coffee ground emesis, PPI 40 IV BID subsequently started, continued to have brown stools. GI consulted for coffee ground emesis.     Patient remains intubated, but following commands. Family at bedside. Denies prior episodes of vomiting black substance or vomiting red blood, no prior episodes of bloody red/black BMs. No prior colonoscopy. Last EGD in  for PEG placement but otherwise unremarkable exam. Non-smoker, no alcohol or illicit/IVDU. No fhx of GI/colon cancer.    Patient currently stable, hypertensive and afebrile. Hgb 7.8 (baseline ~9).  Hgb 8/15 was 6.5, received 1 pRBC and it corrected to 8.2, then dropped to 6.6 on ; given another unit of blood to correct hgb to 7.7.  INR wnl.        PMHX/PSHX:  Breast CA    Diabetes    Stroke    Cardiac arrest    HTN (hypertension)    No significant past surgical history    H/O mastectomy, bilateral      Allergies:  isoniazid (Rash)  nafcillin (Unknown)  hydrALAZINE (Rash)  vitamin E (Short breath; Urticaria; Hives)  doxycycline (Rash)  cefepime (Rash)  NIFEdipine (Urticaria; Hives)      Home Medications: reviewed  Hospital Medications:  acetaminophen   Oral Liquid .. 650 milliGRAM(s) Oral every 6 hours PRN  albuterol/ipratropium for Nebulization 3 milliLiter(s) Nebulizer every 8 hours  atorvastatin 40 milliGRAM(s) Oral at bedtime  buMETAnide Infusion 1 mG/Hr IV Continuous <Continuous>  chlorhexidine 0.12% Liquid 15 milliLiter(s) Oral Mucosa every 12 hours  chlorhexidine 2% Cloths 1 Application(s) Topical daily  cloNIDine 0.1 milliGRAM(s) Oral every 8 hours  dexMEDEtomidine Infusion 0.1 MICROgram(s)/kG/Hr IV Continuous <Continuous>  dextrose 5%. 1000 milliLiter(s) IV Continuous <Continuous>  dextrose 5%. 1000 milliLiter(s) IV Continuous <Continuous>  dextrose 50% Injectable 12.5 Gram(s) IV Push once  dextrose 50% Injectable 25 Gram(s) IV Push once  dextrose 50% Injectable 25 Gram(s) IV Push once  dextrose Oral Gel 15 Gram(s) Oral once PRN  glucagon  Injectable 1 milliGRAM(s) IntraMuscular once  heparin   Injectable 5000 Unit(s) SubCutaneous every 8 hours  insulin lispro (ADMELOG) corrective regimen sliding scale   SubCutaneous every 6 hours  labetalol 600 milliGRAM(s) Oral three times a day  niCARdipine Infusion 5 mG/Hr IV Continuous <Continuous>  pantoprazole  Injectable 40 milliGRAM(s) IV Push two times a day  predniSONE   Tablet 40 milliGRAM(s) Oral daily  sevelamer carbonate 1600 milliGRAM(s) Oral every 8 hours      Social History:   None per family.    Family history:   No fhx of malignancy per family.    ROS:   Limited ROS due to patient's condition.     PHYSICAL EXAM:   Vital Signs:  Vital Signs Last 24 Hrs  T(C): 36.6 (25 Aug 2023 08:00), Max: 37.1 (25 Aug 2023 00:00)  T(F): 97.8 (25 Aug 2023 08:00), Max: 98.8 (25 Aug 2023 00:00)  HR: 65 (25 Aug 2023 10:50) (58 - 86)  BP: --  BP(mean): --  RR: 16 (25 Aug 2023 10:00) (11 - 30)  SpO2: 100% (25 Aug 2023 10:50) (97% - 100%)    Parameters below as of 25 Aug 2023 10:00  Patient On (Oxygen Delivery Method): ventilator    O2 Concentration (%): 30  Daily     Daily Weight in k.8 (25 Aug 2023 04:00)    GENERAL: no acute distress  NEURO: alert and following commands  HEENT: anicteric sclera, no conjunctival pallor appreciated, OGT with minimal dark liquid  CHEST: no respiratory distress, no accessory muscle use; intubated  CARDIAC: regular rate  ABDOMEN: soft, non-tender, non-distended but large abdominal habitus, no rebound or guarding  EXTREMITIES: warm, well perfused, no edema  SKIN: ecchymosis and ?hematoma on right lower abdomen     LABS:                         7.8    14.96 )-----------( 345      ( 25 Aug 2023 01:30 )             24.2     08-    136  |  99  |  87<H>  ----------------------------<  144<H>  4.4   |  22  |  2.97<H>    Ca    7.6<L>      25 Aug 2023 01:30  Phos  7.6       Mg     2.30         TPro  6.0  /  Alb  2.7<L>  /  TBili  0.3  /  DBili  x   /  AST  36<H>  /  ALT  43<H>  /  AlkPhos  164<H>      LIVER FUNCTIONS - ( 25 Aug 2023 01:30 )  Alb: 2.7 g/dL / Pro: 6.0 g/dL / ALK PHOS: 164 U/L / ALT: 43 U/L / AST: 36 U/L / GGT: x               Diagnostic Studies:  CT ABDOMEN AND PELVIS                        PROCEDURE DATE:  2021        INTERPRETATION:  CLINICAL INFORMATION: Blood in stool. Evaluate for   colitis.    COMPARISON: CT abdomen/pelvis 2018 and CT chest 6/10/2019    CONTRAST/COMPLICATIONS:  IV Contrast: NONE  Oral Contrast: NONE  Complications: None reported at time of study completion    PROCEDURE:  CT of the Abdomen and Pelvis was performed.  Sagittal and coronal reformats were performed.    FINDINGS:  Evaluation of the solid organs and vasculature is limited without   intravenous contrast.    LOWER CHEST: Left chest wall loop recorder. Trace right pleural effusion.    LIVER: Within normal limits.  BILE DUCTS: Normal caliber.  GALLBLADDER: Within normal limits.  SPLEEN: Within normal limits.  PANCREAS: Within normal limits.  ADRENALS: Within normal limits.  KIDNEYS/URETERS: Within normal limits.    BLADDER: Within normal limits.  REPRODUCTIVE ORGANS: Uterus and adnexa within normal limits.    BOWEL: Rectal wall thickening and mild perirectal fat stranding,   compatible with proctitis. No bowel obstruction. Appendix is normal.  PERITONEUM: No ascites.  VESSELS: Atherosclerotic changes.  RETROPERITONEUM/LYMPH NODES: No lymphadenopathy.  ABDOMINALWALL: Within normal limits.  BONES: Degenerative changes. Superior endplate compression deformity of   L1 vertebral body is new since 6/10/2019.    IMPRESSION:  Proctitis.    Age-indeterminate L1 vertebral compression deformity.    Trace right pleural effusion.    --- End of Report ---

## 2023-08-25 NOTE — PROGRESS NOTE ADULT - SUBJECTIVE AND OBJECTIVE BOX
Remains on vent/remains sedated on Precedex      VITAL:  T(C): , Max: 37.1 (08-25-23 @ 00:00)  T(F): , Max: 98.8 (08-25-23 @ 00:00)  HR: 66 (08-25-23 @ 06:53)  BP: 174/49  RR: 11 (08-25-23 @ 06:53)  SpO2: 100% (08-25-23 @ 06:53)  urine output 625cc/24h    PHYSICAL EXAM:  Constitutional: intubated/mildly sedated but following simple commands  HEENT: (+)ETT/OGT  Neck: Supple, No JVD  Respiratory: coarse BS b/l  Cardiovascular: RRR s1s2, no m/r/g  Gastrointestinal: BS+, soft, NT/ND  Extremities: No peripheral edema b/l  Back: no CVAT b/l  Skin: No rashes, no nevi  Access: [facility]      LABS:                        7.8    14.96 )-----------( 345      ( 25 Aug 2023 01:30 )             24.2     Na(136)/K(4.4)/Cl(99)/HCO3(22)/BUN(87)/Cr(2.97)Glu(144)/Ca(7.6)/Mg(2.30)/PO4(7.6)    08-25 @ 01:30  Na(135)/K(4.3)/Cl(100)/HCO3(22)/BUN(80)/Cr(2.82)Glu(144)/Ca(7.7)/Mg(2.30)/PO4(7.7)    08-24 @ 15:20  Na(135)/K(4.0)/Cl(100)/HCO3(21)/BUN(78)/Cr(2.66)Glu(186)/Ca(7.9)/Mg(2.40)/PO4(6.8)    08-24 @ 01:50  Na(133)/K(4.1)/Cl(99)/HCO3(25)/BUN(65)/Cr(2.34)Glu(232)/Ca(8.3)/Mg(2.40)/PO4(5.9)    08-23 @ 00:05      IMPRESSION: 73F w/ HTN, DM2, CVA, breast CA-bilateral mastectomy, recurrent aspiration pneumonia/respiratory failure, and CKD, 8/11/23 p/w acute hypercapnic respiratory failure; c/b RUSS    (1)Renal - CKD4     (2)RUSS - Nonoliguric; creatinine appears to be plateauing at around 3mg/dL, corresponding with GFR ~15ml/min. Appears that we can hold off with further dialysis for now. Ischemic ATN, vs AIN. On Prednisone 40mg po qd given possibility of AIN. Ultimately not unreasonable to forgo renal biopsy here; we can plan for 2 week course of Prednisone as ordered, and then taper down.    (3)Hyperphosphatemia - slowly worsening    (4)Pulm- hypercapnic respiratory failure on admission - remains intubated    (5)CV - hypertensive; off Nicardipine gtt; on Labetalol 500tid at this point. No renal contraindication to adding diuretics at this point    (6)ID - PNA - s/p IV abx      RECOMMEND:  (1)Prednisone 40mg po qd x 2 week course, then taper down  (2)Can reinitiate ASA at this point  (3)Could add Renvela powder, 1600mg TID via OGT  (4)Can add Bumex,2mg IV 1-2x/day  (5)No further HD for now/can remove shiley  (6)No objection to removal of willard catheter at this point  (7)Dose new meds for GFR ~15ml/min            Jimmy Colon MD  [facility]  Office/on call physician: [phone]  Cell (7a-7p): [phone]       Remains on vent/remains sedated on Precedex      VITAL:  T(C): , Max: 37.1 (08-25-23 @ 00:00)  T(F): , Max: 98.8 (08-25-23 @ 00:00)  HR: 66 (08-25-23 @ 06:53)  BP: 174/49  RR: 11 (08-25-23 @ 06:53)  SpO2: 100% (08-25-23 @ 06:53)  urine output 625cc/24h    PHYSICAL EXAM:  Constitutional: intubated/mildly sedated but following simple commands  HEENT: (+)ETT/OGT  Neck: Supple, No JVD  Respiratory: coarse BS b/l  Cardiovascular: RRR s1s2, no m/r/g  Gastrointestinal: BS+, soft, NT/ND  Extremities: No peripheral edema b/l  Back: no CVAT b/l  Skin: No rashes, no nevi  Access: [facility]      LABS:                        7.8    14.96 )-----------( 345      ( 25 Aug 2023 01:30 )             24.2     Na(136)/K(4.4)/Cl(99)/HCO3(22)/BUN(87)/Cr(2.97)Glu(144)/Ca(7.6)/Mg(2.30)/PO4(7.6)    08-25 @ 01:30  Na(135)/K(4.3)/Cl(100)/HCO3(22)/BUN(80)/Cr(2.82)Glu(144)/Ca(7.7)/Mg(2.30)/PO4(7.7)    08-24 @ 15:20  Na(135)/K(4.0)/Cl(100)/HCO3(21)/BUN(78)/Cr(2.66)Glu(186)/Ca(7.9)/Mg(2.40)/PO4(6.8)    08-24 @ 01:50  Na(133)/K(4.1)/Cl(99)/HCO3(25)/BUN(65)/Cr(2.34)Glu(232)/Ca(8.3)/Mg(2.40)/PO4(5.9)    08-23 @ 00:05      IMPRESSION: 73F w/ HTN, DM2, CVA, breast CA-bilateral mastectomy, recurrent aspiration pneumonia/respiratory failure, and CKD, 8/11/23 p/w acute hypercapnic respiratory failure; c/b RUSS    (1)Renal - CKD4     (2)RUSS - Nonoliguric, but urine output <1L/24h. Creatinine appears to be plateauing at around 3mg/dL, corresponding with GFR ~15ml/min. Ischemic ATN, vs AIN. On Prednisone 40mg po qd given possibility of AIN. Ultimately not unreasonable to forgo renal biopsy here; we can plan for 2 week course of Prednisone as ordered, and then taper down.    (3)Hyperphosphatemia - slowly worsening    (4)Pulm- hypercapnic respiratory failure on admission - remains intubated    (5)CV - hypertensive; off Nicardipine gtt; on Labetalol 500tid at this point. BP still running high. Urine output <1L/24h and pulmonary edema may be interfering with ability to wean off vent. I would like to try to avoid further dialysis if possible here. We can try being very aggressive with diuretics today; if still with limited urine output despite aggressive diuretics, I would be amenable to attempting HD tomorrow to further offload fluid/help facilitate extubation.        RECOMMEND:  (1)Bumex 1-2mg/h gtt  (2)No HD today; if urine output remains limited (<2.5-3L/24h) then I would not object to HD tomorrow with high volume ultrafiltration  (3)Prednisone 40mg po qd x 2 week course, then taper down  (4)Can reinitiate ASA at this point  (5)Could add Renvela powder, 1600mg TID via OGT  (6)Dose new meds for GFR ~15ml/min    d/w'd MICU attending Dr. Noe Colon MD  [facility]  Office/on call physician: [phone]  Cell (7a-7p): [phone]

## 2023-08-25 NOTE — PROGRESS NOTE ADULT - ATTENDING COMMENTS
73 F with DM, CKD, HFpEF, h/o CVA and trach and PEG (now removed) here with acute hypoxemic and hypercapnic respiratory failure requiring intubation in setting of RUSS and acute pulmonary edema, possible ATN vs AIN    will restart cardene given high BP    # acute hypoxemic and hypercapnic respiratory failure  # RUSS/ATN/AIN  # acute pulmonary edema  # essential hypertension  - c/w full vent support for now, PS trials as tolerated  - wean precedex as tolerated  - increased labetalol already, may start clonidine  - d/w renal, will try bumex gtt instead and may need dialysis tomorrow if not negative  - c/w prednisone for possible AIN, will taper based on nephrology reccs  - adjust insulin for md

## 2023-08-25 NOTE — PROGRESS NOTE ADULT - ASSESSMENT
72 y/o F DM on insulin, HTN, CKD, CHFpEF, breast CA, respiratory arrest and cardiac arrest (2018), CVA with residual weakness, aspiration PNA h/o trache, PEG, both removed presents with respiratory distress requiring intubation. May be volume overload i/s/o CHF/CKD vs decrease respiratory drive (given oxygen at home). Improving mental status however worsening renal function and anuria, concerning for ATN vs AIN.     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 No Hx Afib or documented   STEPHANIE with no TRINITY thrombus or intracardiac shunts   - avoid hypertension with BP < 180 systolic   - holding ASA for possible kidney biopsy planned for 8/22   -- 8/23: Bx deferred, restart ASA?  -- 8/24: restart ASA  - c/w statin   - 8/22: wean sedation as tolerated    Cardiac   #Hypertension  - 8/23: Labetalol 500 off cardene gtt, wean cardene as tolerated  - 8/24: increase Labetalol to 600 starting with the 2pm dose  - 8/25: Clonidine 0.1 bid, goal SBP<140, restart cardene gtt goal SBP<180    #Shock  #Hypertension   - Initially vasoplegic from prop  - weaned vaso and levo, now hypertensive likely from fluid overload with renal dysfunction   > changed coreg to labetalol and uptitrated to 400 TID for BP control given CVA   > on nitro drip     #CHFpEF   TTE 7/2023 with EF 59%, with severe LVH and diastolic dysfunction   pro-BNP > 44580, trop 78   Repeat TTE with EF 60% normal systolic and grade 1 diastolic dysfunction     Pulmonary   #Acute hypercapnic and hypoxemic respiratory failure   DDx: aspiration vs volume overload vs sedating medication decreasing drive (was given nyquil)   VBG with respiratory acidosis pCO2 111  CXR with small right pleural effusion, no consolidations/opacities  > c/w mechanical ventilation   > PS trials, plan for possible extubation 8/21  > monitor blood gas while on vent    - 8/21: d/c hypertonic saline  - 8/24:   -- CPAP changed to 5/5 PSV/PEEP  -- AM ABG, f/u and if fine, d/c precedex and extubate to BiPAP  - 8/25: CPAP trials    /Renal   #RUSS on CKD  Ddx: obstructive (willard in, no hydro on POCUS) vs low flow state vs AIN i/s/o cephalosporin use and drug rash   Urinalysis with 7 WBC, would not pursue steroids at this time per nephro  Worsening creatinine still but no urgent indication for HD, ATN   - kidney biopsy to r/o AIN planned 8/22   - cont empiric prednisone 40mg started 8/18   - trialed diuretics without improvement   - Continue to monitor, dose meds per eGFR. avoid nephrotoxic agents  - first HD session 8/19, will plan for additional fluid removal or HD sessions with nephro before extubation  - 8/22: IR kidney Bx, f/u results  - 8/23: Bx deferred  -- Bx non-urgent per nephro  -- IR counseled proxy while obtaining consent but proxy unable to decide  -- Strict IOs per nephro  -- Willard  - 8/24: Bumex 4 goal of net negative I/Os  -- hold HD per nephro  -- Consider pred taper 8/25  - 8/25: start Bumex gtt 1mg/h    #Hyperkalemia with EKG changes    7.2, hemolyzed, given insulin and D50 + calcium gluc   Likely i/s/o renal dysfunction   Received lasix 80 and 40 IV initially but without adequate response   Given bumex 2mg the morning of 8/12  Per nephro, started on bumex gtt with worsening creatinine   - decreasing urine output, now oliguric   - off bumex gtt     GI  Diet: NPO  - 8/23: Bx deferred, restart TF?  - 8/24: NPO, restart TF if extubation performed    Endocrine  #T2DM   A1C 7.1 in 7/2023   - c/w NPH to 14U q6 with mod SS  -- 8/21: If PM FS elevated, increase to 16U  -- 8/22: NPH 18U  - BG goal 140-200   -- 8/23: If NPO, sliding scale but hold NPH + permissive transient hyperglycemia.  -- 8/25: d/c NPH order    ID   No fever/leukocytosis, recheck temp   Hx latent TB which was treated, no concern for TB   Started on empiric vancomycin and aztreonam (cefepime/zosyn allergy? developed rash req prednisone)   Trialed cefepime --> cefazolin (sputum MSSA), developed drug eruption - discontinued   Blood and urine cultures 8/11 demonstrating no growth currently  Rising leukocytosis with low grade temp  - start empiric antibx if febrile > 101  - BC NGTD  - willard removed     Heme/Onc  ppx: heparin q8 hrs   8/21: Coag Panel for IR/kidney bx 8/22 8/22: confirm with IR regarding restarting DVT PPx within 12-24h s/p IR kidney Bx  8/23: AC contingent on Bx   -- Restart SQH 5000    Ethics  GOC - Per previous GOC with daughter and , reported that they have not talked about end of life care with the patient. For now, they wanted "everything to be done" including CPR, continuing with mech ventilation/intubation, pressors   72 y/o F DM on insulin, HTN, CKD, CHFpEF, breast CA, respiratory arrest and cardiac arrest (2018), CVA with residual weakness, aspiration PNA h/o trache, PEG, both removed presents with respiratory distress requiring intubation. May be volume overload i/s/o CHF/CKD vs decrease respiratory drive (given oxygen at home). Improving mental status however worsening renal function and anuria, concerning for ATN vs AIN.     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 No Hx Afib or documented   STEPHANIE with no TRINITY thrombus or intracardiac shunts   - avoid hypertension with BP < 180 systolic   - holding ASA for possible kidney biopsy planned for 8/22   -- 8/23: Bx deferred, restart ASA?  -- 8/24: restart ASA  - c/w statin   - 8/22: wean sedation as tolerated    Cardiac   #Hypertension  - 8/23: Labetalol 500 off cardene gtt, wean cardene as tolerated  - 8/24: increase Labetalol to 600 starting with the 2pm dose  - 8/25: Clonidine 0.1 bid, goal SBP<140, restart cardene gtt goal SBP<180    #Shock  #Hypertension   - Initially vasoplegic from prop  - weaned vaso and levo, now hypertensive likely from fluid overload with renal dysfunction   > changed coreg to labetalol and uptitrated to 400 TID for BP control given CVA   > on nitro drip     #CHFpEF   TTE 7/2023 with EF 59%, with severe LVH and diastolic dysfunction   pro-BNP > 06586, trop 78   Repeat TTE with EF 60% normal systolic and grade 1 diastolic dysfunction     Pulmonary   #Acute hypercapnic and hypoxemic respiratory failure   DDx: aspiration vs volume overload vs sedating medication decreasing drive (was given nyquil)   VBG with respiratory acidosis pCO2 111  CXR with small right pleural effusion, no consolidations/opacities  > c/w mechanical ventilation   > PS trials, plan for possible extubation 8/21  > monitor blood gas while on vent    - 8/21: d/c hypertonic saline  - 8/24:   -- CPAP changed to 5/5 PSV/PEEP  -- AM ABG, f/u and if fine, d/c precedex and extubate to BiPAP  - 8/25: CPAP trials    /Renal   #RUSS on CKD  Ddx: obstructive (willard in, no hydro on POCUS) vs low flow state vs AIN i/s/o cephalosporin use and drug rash   Urinalysis with 7 WBC, would not pursue steroids at this time per nephro  Worsening creatinine still but no urgent indication for HD, ATN   - kidney biopsy to r/o AIN planned 8/22   - cont empiric prednisone 40mg started 8/18   - trialed diuretics without improvement   - Continue to monitor, dose meds per eGFR. avoid nephrotoxic agents  - first HD session 8/19, will plan for additional fluid removal or HD sessions with nephro before extubation  - 8/22: IR kidney Bx, f/u results  - 8/23: Bx deferred  -- Bx non-urgent per nephro  -- IR counseled proxy while obtaining consent but proxy unable to decide  -- Strict IOs per nephro  -- Willard  - 8/24: Bumex 4 goal of net negative I/Os  -- hold HD per nephro  -- Consider pred taper 8/25  - 8/25: start Bumex gtt 1mg/h. Per nephro, dialyze 8/26 if Bumex gtt fails to shift IO to net negative.     #Hyperkalemia with EKG changes    7.2, hemolyzed, given insulin and D50 + calcium gluc   Likely i/s/o renal dysfunction   Received lasix 80 and 40 IV initially but without adequate response   Given bumex 2mg the morning of 8/12  Per nephro, started on bumex gtt with worsening creatinine   - decreasing urine output, now oliguric   - off bumex gtt     GI  Diet: NPO  - 8/23: Bx deferred, restart TF?  - 8/24: NPO, restart TF if extubation performed    Endocrine  #T2DM   A1C 7.1 in 7/2023   - c/w NPH to 14U q6 with mod SS  -- 8/21: If PM FS elevated, increase to 16U  -- 8/22: NPH 18U  - BG goal 140-200   -- 8/23: If NPO, sliding scale but hold NPH + permissive transient hyperglycemia.  -- 8/25: d/c NPH order    ID   No fever/leukocytosis, recheck temp   Hx latent TB which was treated, no concern for TB   Started on empiric vancomycin and aztreonam (cefepime/zosyn allergy? developed rash req prednisone)   Trialed cefepime --> cefazolin (sputum MSSA), developed drug eruption - discontinued   Blood and urine cultures 8/11 demonstrating no growth currently  Rising leukocytosis with low grade temp  - start empiric antibx if febrile > 101  - BC NGTD  - willard removed     Heme/Onc  ppx: heparin q8 hrs   8/21: Coag Panel for IR/kidney bx 8/22 8/22: confirm with IR regarding restarting DVT PPx within 12-24h s/p IR kidney Bx  8/23: AC contingent on Bx   -- Restart SQH 5000    Ethics  GOC - Per previous GOC with daughter and , reported that they have not talked about end of life care with the patient. For now, they wanted "everything to be done" including CPR, continuing with mech ventilation/intubation, pressors   72 y/o F DM on insulin, HTN, CKD, CHFpEF, breast CA, respiratory arrest and cardiac arrest (2018), CVA with residual weakness, aspiration PNA h/o trache, PEG, both removed presents with respiratory distress requiring intubation. May be volume overload i/s/o CHF/CKD vs decrease respiratory drive (given oxygen at home). Improving mental status however worsening renal function and anuria, concerning for ATN vs AIN.     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 No Hx Afib or documented   STEPHANIE with no TRINITY thrombus or intracardiac shunts   - avoid hypertension with BP < 180 systolic   - holding ASA for possible kidney biopsy planned for 8/22   -- 8/23: Bx deferred, restart ASA?  -- 8/24: restart ASA  - c/w statin   - 8/22: wean sedation as tolerated    Cardiac   #Hypertension  - 8/23: Labetalol 500 off cardene gtt, wean cardene as tolerated  - 8/24: increase Labetalol to 600 starting with the 2pm dose  - 8/25: Clonidine 0.1 bid, goal SBP<140, restart cardene gtt goal SBP<180    #Shock  #Hypertension   - Initially vasoplegic from prop  - weaned vaso and levo, now hypertensive likely from fluid overload with renal dysfunction   > changed coreg to labetalol and uptitrated to 400 TID for BP control given CVA   > on nitro drip     #CHFpEF   TTE 7/2023 with EF 59%, with severe LVH and diastolic dysfunction   pro-BNP > 28893, trop 78   Repeat TTE with EF 60% normal systolic and grade 1 diastolic dysfunction     Pulmonary   #Acute hypercapnic and hypoxemic respiratory failure   DDx: aspiration vs volume overload vs sedating medication decreasing drive (was given nyquil)   VBG with respiratory acidosis pCO2 111  CXR with small right pleural effusion, no consolidations/opacities  > c/w mechanical ventilation   > PS trials, plan for possible extubation 8/21  > monitor blood gas while on vent    - 8/21: d/c hypertonic saline  - 8/24:   -- CPAP changed to 5/5 PSV/PEEP  -- AM ABG, f/u and if fine, d/c precedex and extubate to BiPAP  - 8/25: CPAP trials    /Renal   #RUSS on CKD  Ddx: obstructive (willard in, no hydro on POCUS) vs low flow state vs AIN i/s/o cephalosporin use and drug rash   Urinalysis with 7 WBC, would not pursue steroids at this time per nephro  Worsening creatinine still but no urgent indication for HD, ATN   - kidney biopsy to r/o AIN planned 8/22   - cont empiric prednisone 40mg started 8/18   - trialed diuretics without improvement   - Continue to monitor, dose meds per eGFR. avoid nephrotoxic agents  - first HD session 8/19, will plan for additional fluid removal or HD sessions with nephro before extubation  - 8/22: IR kidney Bx, f/u results  - 8/23: Bx deferred  -- Bx non-urgent per nephro  -- IR counseled proxy while obtaining consent but proxy unable to decide  -- Strict IOs per nephro  -- Willard  - 8/24: Bumex 4 goal of net negative I/Os  -- hold HD per nephro  -- Consider pred taper 8/25  - 8/25: start Bumex gtt 1mg/h. Per nephro, dialyze 8/26 if Bumex gtt fails to shift IO to net negative.     #Hyperkalemia with EKG changes    7.2, hemolyzed, given insulin and D50 + calcium gluc   Likely i/s/o renal dysfunction   Received lasix 80 and 40 IV initially but without adequate response   Given bumex 2mg the morning of 8/12  Per nephro, started on bumex gtt with worsening creatinine   - decreasing urine output, now oliguric   - off bumex gtt     GI  Diet: NPO  - 8/23: Bx deferred, restart TF?  - 8/24: NPO, restart TF if extubation performed    Endocrine  #T2DM   A1C 7.1 in 7/2023   - c/w NPH to 14U q6 with mod SS  -- 8/21: If PM FS elevated, increase to 16U  -- 8/22: NPH 18U  - BG goal 140-200   -- 8/23: If NPO, sliding scale but hold NPH + permissive transient hyperglycemia.  -- 8/25: d/c NPH order    ID   No fever/leukocytosis, recheck temp   Hx latent TB which was treated, no concern for TB   Started on empiric vancomycin and aztreonam (cefepime/zosyn allergy? developed rash req prednisone)   Trialed cefepime --> cefazolin (sputum MSSA), developed drug eruption - discontinued   Blood and urine cultures 8/11 demonstrating no growth currently  Rising leukocytosis with low grade temp  - start empiric antibx if febrile > 101  - BC NGTD    Heme/Onc  ppx: heparin q8 hrs   8/21: Coag Panel for IR/kidney bx 8/22 8/22: confirm with IR regarding restarting DVT PPx within 12-24h s/p IR kidney Bx  8/23: AC contingent on Bx   -- Restart SQH 5000    Ethics  GOC - Per previous GOC with daughter and , reported that they have not talked about end of life care with the patient. For now, they wanted "everything to be done" including CPR, continuing with mech ventilation/intubation, pressors

## 2023-08-25 NOTE — PROGRESS NOTE ADULT - SUBJECTIVE AND OBJECTIVE BOX
INTERVAL HPI/OVERNIGHT EVENTS: Episiode of coffee grounds emesis yesterday afternoon, CXR read pending. HTN to 160-190s/40-50s, max  s/p Lab 5, 10, 20 IVP overnight.     SUBJECTIVE: Patient seen and examined at bedside.     ROS: All negative except as listed above.    VITAL SIGNS:  ICU Vital Signs Last 24 Hrs  T(C): 36.6 (25 Aug 2023 08:00), Max: 37.1 (25 Aug 2023 00:00)  T(F): 97.8 (25 Aug 2023 08:00), Max: 98.8 (25 Aug 2023 00:00)  HR: 63 (25 Aug 2023 10:00) (58 - 86)  BP: --  BP(mean): --  ABP: 178/47 (25 Aug 2023 10:00) (138/38 - 198/52)  ABP(mean): 95 (25 Aug 2023 10:00) (74 - 322)  RR: 16 (25 Aug 2023 10:00) (11 - 30)  SpO2: 100% (25 Aug 2023 10:00) (97% - 100%)    O2 Parameters below as of 25 Aug 2023 10:00  Patient On (Oxygen Delivery Method): ventilator    O2 Concentration (%): 30      Mode: AC/ CMV (Assist Control/ Continuous Mandatory Ventilation), RR (machine): 12, TV (machine): 360, FiO2: 30, PEEP: 5, ITime: 0.8, MAP: 10, PIP: 30  Plateau pressure:   P/F ratio:     08-24 @ 07:01  -  08-25 @ 07:00  --------------------------------------------------------  IN: 265.8 mL / OUT: 625 mL / NET: -359.2 mL    08-25 @ 07:01  -  08-25 @ 10:51  --------------------------------------------------------  IN: 16.6 mL / OUT: 90 mL / NET: -73.4 mL      CAPILLARY BLOOD GLUCOSE      POCT Blood Glucose.: 140 mg/dL (25 Aug 2023 05:26)      ECG: reviewed.    PHYSICAL EXAM:    GENERAL: NAD, lying in bed   HEAD:  Atraumatic, normocephalic  EYES: EOMI, PERRLA, conjunctiva and sclera clear  NECK: Supple, trachea midline, no JVD  HEART: Regular rate and rhythm, no murmurs, rubs, or gallops  LUNGS: Unlabored respirations on mechanical vent.  Clear to auscultation bilaterally, no crackles, wheezing, or rhonchi  ABDOMEN: Soft, nontender, nondistended, +BS  EXTREMITIES: 2+ peripheral pulses bilaterally, cap refill<2 secs. No clubbing, cyanosis. +Pitting edema   NERVOUS SYSTEM:  A&Ox3, following commands, moving all extremities, no focal deficits   SKIN: No rashes or lesions    MEDICATIONS:  MEDICATIONS  (STANDING):  albuterol/ipratropium for Nebulization 3 milliLiter(s) Nebulizer every 8 hours  atorvastatin 40 milliGRAM(s) Oral at bedtime  buMETAnide Infusion 1 mG/Hr (5 mL/Hr) IV Continuous <Continuous>  buMETAnide Injectable 2 milliGRAM(s) IV Push once  chlorhexidine 0.12% Liquid 15 milliLiter(s) Oral Mucosa every 12 hours  chlorhexidine 2% Cloths 1 Application(s) Topical daily  cloNIDine 0.1 milliGRAM(s) Oral every 8 hours  dexMEDEtomidine Infusion 0.1 MICROgram(s)/kG/Hr (1.66 mL/Hr) IV Continuous <Continuous>  dextrose 5%. 1000 milliLiter(s) (50 mL/Hr) IV Continuous <Continuous>  dextrose 5%. 1000 milliLiter(s) (100 mL/Hr) IV Continuous <Continuous>  dextrose 50% Injectable 12.5 Gram(s) IV Push once  dextrose 50% Injectable 25 Gram(s) IV Push once  dextrose 50% Injectable 25 Gram(s) IV Push once  glucagon  Injectable 1 milliGRAM(s) IntraMuscular once  heparin   Injectable 5000 Unit(s) SubCutaneous every 8 hours  insulin lispro (ADMELOG) corrective regimen sliding scale   SubCutaneous every 6 hours  labetalol 600 milliGRAM(s) Oral three times a day  niCARdipine Infusion 5 mG/Hr (25 mL/Hr) IV Continuous <Continuous>  pantoprazole  Injectable 40 milliGRAM(s) IV Push two times a day  predniSONE   Tablet 40 milliGRAM(s) Oral daily  sevelamer carbonate 1600 milliGRAM(s) Oral every 8 hours    MEDICATIONS  (PRN):  acetaminophen   Oral Liquid .. 650 milliGRAM(s) Oral every 6 hours PRN Temp greater or equal to 38C (100.4F), Mild Pain (1 - 3)  dextrose Oral Gel 15 Gram(s) Oral once PRN Blood Glucose LESS THAN 70 milliGRAM(s)/deciliter      ALLERGIES:  Allergies    isoniazid (Rash)  nafcillin (Unknown)  hydrALAZINE (Rash)  vitamin E (Short breath; Urticaria; Hives)  doxycycline (Rash)  cefepime (Rash)  NIFEdipine (Urticaria; Hives)    Intolerances        LABS:                        7.8    14.96 )-----------( 345      ( 25 Aug 2023 01:30 )             24.2     08-25    136  |  99  |  87<H>  ----------------------------<  144<H>  4.4   |  22  |  2.97<H>    Ca    7.6<L>      25 Aug 2023 01:30  Phos  7.6     08-25  Mg     2.30     08-25    TPro  6.0  /  Alb  2.7<L>  /  TBili  0.3  /  DBili  x   /  AST  36<H>  /  ALT  43<H>  /  AlkPhos  164<H>  08-25    PT/INR - ( 25 Aug 2023 01:30 )   PT: 12.4 sec;   INR: 1.10 ratio         PTT - ( 24 Aug 2023 16:40 )  PTT:28.8 sec  Urinalysis Basic - ( 25 Aug 2023 01:30 )    Color: x / Appearance: x / SG: x / pH: x  Gluc: 144 mg/dL / Ketone: x  / Bili: x / Urobili: x   Blood: x / Protein: x / Nitrite: x   Leuk Esterase: x / RBC: x / WBC x   Sq Epi: x / Non Sq Epi: x / Bacteria: x      ABG:  pH, Arterial: 7.35 (08-25-23 @ 01:30)  pCO2, Arterial: 39 mmHg (08-25-23 @ 01:30)  pO2, Arterial: 121 mmHg (08-25-23 @ 01:30)      vBG:    Micro:    Culture - Blood (collected 08-16-23 @ 20:46)  Source: .Blood Blood-Peripheral  Final Report (08-22-23 @ 02:01):    No growth at 5 days    Culture - Blood (collected 08-16-23 @ 20:13)  Source: .Blood Blood-Peripheral  Final Report (08-22-23 @ 02:01):    No growth at 5 days    Culture - Blood (collected 08-11-23 @ 08:11)  Source: .Blood Blood-Peripheral  Final Report (08-16-23 @ 13:01):    No growth at 5 days    Culture - Blood (collected 08-11-23 @ 08:11)  Source: .Blood Blood-Peripheral  Final Report (08-16-23 @ 13:01):    No growth at 5 days        Culture - Sputum (collected 08-16-23 @ 15:00)  Source: .Sputum Sputum  Gram Stain (08-16-23 @ 23:44):    Few polymorphonuclear leukocytes per low power field    Rare Squamous epithelial cells per low power field    Few Gram Positive Cocci in Clusters per oil power field  Final Report (08-18-23 @ 20:19):    Normal Respiratory Cate present    Culture - Sputum (collected 08-11-23 @ 17:50)  Source: .Sputum Sputum  Gram Stain (08-12-23 @ 07:24):    Rare polymorphonuclear leukocytes per low power field    Rare Squamous epithelial cells per low power field    Numerous Gram positive cocci in pairs per oil power field  Final Report (08-13-23 @ 21:49):    Numerous Staphylococcus aureus  Organism: Staphylococcus aureus (08-13-23 @ 21:49)  Organism: Staphylococcus aureus (08-13-23 @ 21:49)      Method Type: SIMON      -  Ampicillin/Sulbactam: S <=8/4      -  Cefazolin: S <=4      -  Clindamycin: S <=0.25      -  Erythromycin: R >4      -  Gentamicin: S <=1 Should not be used as monotherapy      -  Oxacillin: S 1 Oxacillin predicts susceptibility for dicloxacillin, methicillin, and nafcillin      -  Penicillin: R >8      -  Rifampin: S <=1 Should not be used as monotherapy      -  Tetracycline: S <=1      -  Trimethoprim/Sulfamethoxazole: S <=0.5/9.5      -  Vancomycin: S 2        RADIOLOGY & ADDITIONAL TESTS: Reviewed.

## 2023-08-26 NOTE — PROGRESS NOTE ADULT - SUBJECTIVE AND OBJECTIVE BOX
*******************************  Martha Douglas PGY-2  *******************************    INTERVAL HPI/OVERNIGHT EVENTS:    SUBJECTIVE: Patient seen and examined at bedside.     OBJECTIVE:    VITAL SIGNS:  ICU Vital Signs Last 24 Hrs  T(C): 37.1 (26 Aug 2023 04:00), Max: 37.1 (26 Aug 2023 04:00)  T(F): 98.8 (26 Aug 2023 04:00), Max: 98.8 (26 Aug 2023 04:00)  HR: 60 (26 Aug 2023 07:31) (58 - 78)  BP: --  BP(mean): --  ABP: 135/33 (26 Aug 2023 07:00) (135/33 - 183/49)  ABP(mean): 67 (26 Aug 2023 07:00) (67 - 98)  RR: 17 (26 Aug 2023 07:00) (12 - 21)  SpO2: 100% (26 Aug 2023 07:31) (100% - 100%)    O2 Parameters below as of 26 Aug 2023 07:31  Patient On (Oxygen Delivery Method): ventilator          Mode: AC/ CMV (Assist Control/ Continuous Mandatory Ventilation), RR (machine): 12, TV (machine): 360, FiO2: 30, PEEP: 5, ITime: 0.89, MAP: 10, PIP: 30    08-25 @ 07:01  -  08-26 @ 07:00  --------------------------------------------------------  IN: 943 mL / OUT: 1310 mL / NET: -367 mL      CAPILLARY BLOOD GLUCOSE      POCT Blood Glucose.: 154 mg/dL (26 Aug 2023 05:55)        PHYSICAL EXAM:  GENERAL: NAD, well-developed  HEAD:  Atraumatic, Normocephalic  EYES: EOMI, PERRLA, conjunctiva and sclera clear  NECK: Supple, No JVD  CHEST/LUNG: Clear to auscultation bilaterally; No wheeze  HEART: Regular rate and rhythm; No murmurs, rubs, or gallops  ABDOMEN: Soft, Nontender, Nondistended; Bowel sounds present  EXTREMITIES:  2+ Peripheral Pulses, No clubbing, cyanosis, or edema  PSYCH: AAOx3  NEUROLOGY: non-focal  SKIN: No rashes or lesions  Lines:      MEDICATIONS:  MEDICATIONS  (STANDING):  albuterol/ipratropium for Nebulization 3 milliLiter(s) Nebulizer every 8 hours  atorvastatin 40 milliGRAM(s) Oral at bedtime  buMETAnide Infusion 2 mG/Hr (10 mL/Hr) IV Continuous <Continuous>  chlorhexidine 0.12% Liquid 15 milliLiter(s) Oral Mucosa every 12 hours  chlorhexidine 2% Cloths 1 Application(s) Topical daily  cloNIDine 0.1 milliGRAM(s) Oral every 8 hours  dexMEDEtomidine Infusion 1 MICROgram(s)/kG/Hr (16.6 mL/Hr) IV Continuous <Continuous>  dextrose 5%. 1000 milliLiter(s) (50 mL/Hr) IV Continuous <Continuous>  dextrose 5%. 1000 milliLiter(s) (100 mL/Hr) IV Continuous <Continuous>  dextrose 50% Injectable 12.5 Gram(s) IV Push once  dextrose 50% Injectable 25 Gram(s) IV Push once  dextrose 50% Injectable 25 Gram(s) IV Push once  glucagon  Injectable 1 milliGRAM(s) IntraMuscular once  heparin   Injectable 5000 Unit(s) SubCutaneous every 8 hours  insulin lispro (ADMELOG) corrective regimen sliding scale   SubCutaneous every 6 hours  labetalol 600 milliGRAM(s) Oral three times a day  niCARdipine Infusion 2.5 mG/Hr (12.5 mL/Hr) IV Continuous <Continuous>  pantoprazole  Injectable 40 milliGRAM(s) IV Push two times a day  predniSONE   Tablet 40 milliGRAM(s) Oral daily  sevelamer carbonate 1600 milliGRAM(s) Oral every 8 hours    MEDICATIONS  (PRN):  acetaminophen   Oral Liquid .. 650 milliGRAM(s) Oral every 6 hours PRN Temp greater or equal to 38C (100.4F), Mild Pain (1 - 3)  dextrose Oral Gel 15 Gram(s) Oral once PRN Blood Glucose LESS THAN 70 milliGRAM(s)/deciliter      ALLERGIES:  Allergies    isoniazid (Rash)  nafcillin (Unknown)  hydrALAZINE (Rash)  vitamin E (Short breath; Urticaria; Hives)  doxycycline (Rash)  cefepime (Rash)  NIFEdipine (Urticaria; Hives)    Intolerances        LABS:                        7.4    10.34 )-----------( 341      ( 26 Aug 2023 02:15 )             23.2     08-26    136  |  100  |  96<H>  ----------------------------<  168<H>  4.5   |  18<L>  |  3.19<H>    Ca    7.6<L>      26 Aug 2023 02:15  Phos  8.8     08-26  Mg     2.30     08-26    TPro  6.0  /  Alb  2.7<L>  /  TBili  0.3  /  DBili  x   /  AST  36<H>  /  ALT  43<H>  /  AlkPhos  164<H>  08-25    PT/INR - ( 26 Aug 2023 02:15 )   PT: 14.1 sec;   INR: 1.26 ratio         PTT - ( 26 Aug 2023 02:15 )  PTT:35.3 sec  Urinalysis Basic - ( 26 Aug 2023 02:15 )    Color: x / Appearance: x / SG: x / pH: x  Gluc: 168 mg/dL / Ketone: x  / Bili: x / Urobili: x   Blood: x / Protein: x / Nitrite: x   Leuk Esterase: x / RBC: x / WBC x   Sq Epi: x / Non Sq Epi: x / Bacteria: x        RADIOLOGY & ADDITIONAL TESTS: Reviewed.     *******************************  Martha Douglas PGY-2  *******************************    INTERVAL HPI/OVERNIGHT EVENTS: No acute changes overnight.    SUBJECTIVE: Patient seen and examined at bedside. Responsive, denies pain.     OBJECTIVE:    VITAL SIGNS:  ICU Vital Signs Last 24 Hrs  T(C): 37.1 (26 Aug 2023 04:00), Max: 37.1 (26 Aug 2023 04:00)  T(F): 98.8 (26 Aug 2023 04:00), Max: 98.8 (26 Aug 2023 04:00)  HR: 60 (26 Aug 2023 07:31) (58 - 78)  BP: --  BP(mean): --  ABP: 135/33 (26 Aug 2023 07:00) (135/33 - 183/49)  ABP(mean): 67 (26 Aug 2023 07:00) (67 - 98)  RR: 17 (26 Aug 2023 07:00) (12 - 21)  SpO2: 100% (26 Aug 2023 07:31) (100% - 100%)    O2 Parameters below as of 26 Aug 2023 07:31  Patient On (Oxygen Delivery Method): ventilator          Mode: AC/ CMV (Assist Control/ Continuous Mandatory Ventilation), RR (machine): 12, TV (machine): 360, FiO2: 30, PEEP: 5, ITime: 0.89, MAP: 10, PIP: 30    08-25 @ 07:01  -  08-26 @ 07:00  --------------------------------------------------------  IN: 943 mL / OUT: 1310 mL / NET: -367 mL      CAPILLARY BLOOD GLUCOSE      POCT Blood Glucose.: 154 mg/dL (26 Aug 2023 05:55)        PHYSICAL EXAM:  GENERAL: NAD, lying in bed   HEAD:  Atraumatic, normocephalic  EYES: EOMI, PERRLA, conjunctiva and sclera clear  NECK: Supple, trachea midline, no JVD  HEART: Regular rate and rhythm, no murmurs, rubs, or gallops  LUNGS: Unlabored respirations on mechanical vent.  Clear to auscultation bilaterally, no crackles, wheezing, or rhonchi  ABDOMEN: Soft, nontender, nondistended, +BS  EXTREMITIES: 2+ peripheral pulses bilaterally, cap refill<2 secs. No clubbing, cyanosis. +Pitting edema   NERVOUS SYSTEM:  A&Ox3, following commands, moving all extremities, no focal deficits   SKIN: No rashes or lesions      MEDICATIONS:  MEDICATIONS  (STANDING):  albuterol/ipratropium for Nebulization 3 milliLiter(s) Nebulizer every 8 hours  atorvastatin 40 milliGRAM(s) Oral at bedtime  buMETAnide Infusion 2 mG/Hr (10 mL/Hr) IV Continuous <Continuous>  chlorhexidine 0.12% Liquid 15 milliLiter(s) Oral Mucosa every 12 hours  chlorhexidine 2% Cloths 1 Application(s) Topical daily  cloNIDine 0.1 milliGRAM(s) Oral every 8 hours  dexMEDEtomidine Infusion 1 MICROgram(s)/kG/Hr (16.6 mL/Hr) IV Continuous <Continuous>  dextrose 5%. 1000 milliLiter(s) (50 mL/Hr) IV Continuous <Continuous>  dextrose 5%. 1000 milliLiter(s) (100 mL/Hr) IV Continuous <Continuous>  dextrose 50% Injectable 12.5 Gram(s) IV Push once  dextrose 50% Injectable 25 Gram(s) IV Push once  dextrose 50% Injectable 25 Gram(s) IV Push once  glucagon  Injectable 1 milliGRAM(s) IntraMuscular once  heparin   Injectable 5000 Unit(s) SubCutaneous every 8 hours  insulin lispro (ADMELOG) corrective regimen sliding scale   SubCutaneous every 6 hours  labetalol 600 milliGRAM(s) Oral three times a day  niCARdipine Infusion 2.5 mG/Hr (12.5 mL/Hr) IV Continuous <Continuous>  pantoprazole  Injectable 40 milliGRAM(s) IV Push two times a day  predniSONE   Tablet 40 milliGRAM(s) Oral daily  sevelamer carbonate 1600 milliGRAM(s) Oral every 8 hours    MEDICATIONS  (PRN):  acetaminophen   Oral Liquid .. 650 milliGRAM(s) Oral every 6 hours PRN Temp greater or equal to 38C (100.4F), Mild Pain (1 - 3)  dextrose Oral Gel 15 Gram(s) Oral once PRN Blood Glucose LESS THAN 70 milliGRAM(s)/deciliter      ALLERGIES:  Allergies    isoniazid (Rash)  nafcillin (Unknown)  hydrALAZINE (Rash)  vitamin E (Short breath; Urticaria; Hives)  doxycycline (Rash)  cefepime (Rash)  NIFEdipine (Urticaria; Hives)    Intolerances        LABS:                        7.4    10.34 )-----------( 341      ( 26 Aug 2023 02:15 )             23.2     08-26    136  |  100  |  96<H>  ----------------------------<  168<H>  4.5   |  18<L>  |  3.19<H>    Ca    7.6<L>      26 Aug 2023 02:15  Phos  8.8     08-26  Mg     2.30     08-26    TPro  6.0  /  Alb  2.7<L>  /  TBili  0.3  /  DBili  x   /  AST  36<H>  /  ALT  43<H>  /  AlkPhos  164<H>  08-25    PT/INR - ( 26 Aug 2023 02:15 )   PT: 14.1 sec;   INR: 1.26 ratio         PTT - ( 26 Aug 2023 02:15 )  PTT:35.3 sec  Urinalysis Basic - ( 26 Aug 2023 02:15 )    Color: x / Appearance: x / SG: x / pH: x  Gluc: 168 mg/dL / Ketone: x  / Bili: x / Urobili: x   Blood: x / Protein: x / Nitrite: x   Leuk Esterase: x / RBC: x / WBC x   Sq Epi: x / Non Sq Epi: x / Bacteria: x        RADIOLOGY & ADDITIONAL TESTS: Reviewed.

## 2023-08-26 NOTE — PROGRESS NOTE ADULT - ASSESSMENT
74 y/o F DM on insulin, HTN, CKD, CHFpEF, breast CA, respiratory arrest and cardiac arrest (2018), CVA with residual weakness, aspiration PNA h/o trache, PEG, both removed presents with respiratory distress requiring intubation. Found to have elevated BNP, may be 2/2 to volume overload i/s/o CKD vs CHF.     Neuro ;  - Sedated : Baseline MS AOx3   -WAS ON PROPOFOL AND FENTANYL BEFORE: NOW OFF:    - Monitor her mental status:  requiring only 30% fio2:    -8/15: - now she has been off sedation for more then 24 hours but is still lethargic: her o2 requirement has not increased:   -for possible extubation if she wakes up   :: -dw PMD: pt is still lethargic  for MRI brain stem today   ": -had ct head: OK: awaiting MRI brain : still lethargic  : seemed same to me: family at bedside who says she is little  bit more awake: oxygen requirement is same:  at 30%:off vasopressors   -now neuro note reviewed:  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 reason for inability ot trigger vent and co2 retention initially ? candidate for trach    -now the co2 i s normal : not much improvement in her mental statis:  off sedation for days   /: still lethargic: on precedex;  cxr reviewed:  last time at [facility] she was found to have cavity in RUL : she was ruled out for tb : her AFB was positive but was MAC /; defer to MICU   "  still sedated:  no sob:  on 30% fio2:  on precedex:  and is on nicardipine as well as labetalol ; for renal biopsy today  : seems OK: more alert and awake:  renal biopsy is still pending:  [facility] family   : doing OK: alert and awake:  but still intubated:  getting cpap : no renal biopsy done   : notable to be weaned:  probably trach next week if she is not extubated by early next week : restarted on bumex  : doing same on cpap trials at this time:  cont iv diuresis per renal ; on predniosne for enalissues:   CVA   - cont aspirin and statin   //-per neurology   : sedated  neuro followin/23:edation:  seems to be doing better:  plan for extubation if family refused for biopsy   : more awake and alert :   : stable  : sheis awake but is letahrgic  Cardiac   -CHFpEF : TTE 2023 with EF 59%, with severe LVH and diastolic dysfunction : pro-BNP > 41651, trop 78   -on bumex drip :  -cards following   8/15: : she is off bumex drip and is on midodrine   : remains off bumex  :: off bumex:  defer to MICU team   cont to remain off diuretics   : she seems same to me: in renal failure off diuretics   : per Micu   : seems stable  : back  on bumex drip    : cont bumex drip    Pulmonary   -Acute hypercapnic and hypoxemic respiratory failure   -DDx: aspiration vs volume overload  VBG with respiratory acidosis pCO2 111  - CXR with small right pleural effusion, no consolidations/opacities  -now she seems better:  fio2: 30$:  -? etiology of hypercarbia:  multifactorial : seems to have OHS and TYRONE superimposed by acute chf  ? and aspiration pneumonia:"   -pt is mostly bed bound:   -it is possible that this time:  she mght need bipap on dc : atleast at the night time:   -on 8/15:  still remains intubated  :still lethargic:  not extubated hypercarbic acidotic today on abg:  weaning trials :probably would need bipap following extubation  : :still lethargic: :awaiting mri brain : dw    : :MRI done has new right cerebellar infarct on mri : defer to MICU   -seen by bora now:  has brain stem infarct to per neuro note:  reason for being vent;    : awaiting renal bipsy today  : and then aggressive weaning  : now no renal biopsy :    : still sightly hypoxic but good sao2:  cont to wean and extubate if needed  still has AMS   /Renal   -Hyperkalemia with EKG changes  : 7.2, hemolyzed, given insulin and D50 + calcium gluc   -K is better today : cont to monitor  -normal today on 8/15   8/16:stable  :-k has been ok for l ast few days  : stable  : started on prednisone yesterday for suspected AIN by renal    : wbc is high : likely secondary to steroids  she has no fveR:  ruled out for tb last time   : for renal biopsy today   : RENAL  BIOPSY NOT DONE:  FAMILY IS THINKING about itl   : biopsy not done: urine output increased:   : doing  ok : no sob:  no biopsy for now:   : per primary pulm tea in MICU   Endocrine  T2DM : A1C 7.1 in 2023   -cont to monitor blood glucose  ID   - No fever/leukocytosis  - Hx latent TB which was treated, no concern for TB  : She was recently ruled out for tuberculosis at [facility]   - Started on empiric vancomycin and aztreonam (cefepime/zosyn allergy? developed rash req prednisone)  : now dced:   defer to MICU   - f/u MRSA   - follow up blood culture/urine   :blood cultures negative so far: legionella is negative:  remains off antibiotics  staph aureus on sputum   :: off antibiotics at this time: secondary to drug eruption  : remains off antibiotics   -: off diuretics   : off antibiotics   : off antibiotics   : off antibiotics   : off antibtiocs  : remains afebrile:     dw micu and  at bedside   overall prognosis seems guarded

## 2023-08-26 NOTE — PROGRESS NOTE ADULT - ASSESSMENT
# Coffee ground emesis  Episode of coffee ground emesis occurred after multiple episodes of vomiting/retching while intubated with OGT; no associated melena/hematochezia with yellow-tan stool on ISMAEL and when moves her bowels. Episode likely due to erosive esophagitis or minor aravind littlejohn tear given stability without profuse bleeding vs OGT trauma, PUD, erosive gastropathy. Note, given reports of oral suctioning with respiratory therapy and intermittent episodes of nosebleeding (per family, though not noted inpatient), may also have swallowed blood from the oropharyngeal cavity. In either case, patient is not medically optimized for a non-urgent endoscopic evaluation and family as well would like to hold off on invasive interventions unless it should become more emergent given her age, condition, and comorbidities.     Recommendations:  - No plan for non-urgent endoscopic evaluation given patient's condition and in accordance with family's wishes with pursue medical management unless becomes more emergent  - PPI 40 IV BID, then transition to PO BID when extubated   - Daily CBC, CMP, coags  - Transfuse if hgb < 7  - Ensure adequate hydration  - Okay to resume TFs if no additional episodes of bleeding and hgb stable  - Antiemetics as needed  - If becomes hemodynamically unstable with overt bleeding obtain stat CTA and consider IR consult; inform GI   - Rest of care per primary    GI to sign off, please call back as needed.    All recommendations are tentative until note is attested by attending.     Chrystal Salazar, PGY5  Gastroenterology/Hepatology Fellow  Available on [employer] Teams  [phone] ([employer] Short Range Pager)  [phone] (Long Range Pager)    After 5pm, please contact the on-call GI fellow. [phone]

## 2023-08-26 NOTE — PROGRESS NOTE ADULT - ATTENDING COMMENTS
No reports of overt GI bleeding. Hgb remains stable in mid-7s. Suggest continuation of PPI and conservative management in accordance with previously discussed GOC. No plans for EGD at this time. Additional recommendations as above. Please reach out with questions/concerns.

## 2023-08-26 NOTE — PROGRESS NOTE ADULT - SUBJECTIVE AND OBJECTIVE BOX
Patient is a 73y old  Female who presents with a chief complaint of Respiratory distress (26 Aug 2023 15:47)      SUBJECTIVE / OVERNIGHT EVENTS: remains intubated in MICU, sedated, responsive, lethargic, on Bumex drip, oliguric, may need HD 2/2 hypervolemia, on prednisone for 2 weeks for AIN, HTN urgency on cardene drip being weaned off, clonidine dose raised to 0.2 mg tid, ASA restarted, s/p CVA, cont statin, on PPI    MEDICATIONS  (STANDING):  albuterol/ipratropium for Nebulization 3 milliLiter(s) Nebulizer every 8 hours  aspirin  chewable 81 milliGRAM(s) Oral daily  atorvastatin 40 milliGRAM(s) Oral at bedtime  buMETAnide Infusion 2 mG/Hr (10 mL/Hr) IV Continuous <Continuous>  chlorhexidine 0.12% Liquid 15 milliLiter(s) Oral Mucosa every 12 hours  chlorhexidine 2% Cloths 1 Application(s) Topical daily  cloNIDine 0.2 milliGRAM(s) Oral every 8 hours  dexMEDEtomidine Infusion 1 MICROgram(s)/kG/Hr (16.6 mL/Hr) IV Continuous <Continuous>  dextrose 5%. 1000 milliLiter(s) (50 mL/Hr) IV Continuous <Continuous>  dextrose 5%. 1000 milliLiter(s) (100 mL/Hr) IV Continuous <Continuous>  dextrose 50% Injectable 12.5 Gram(s) IV Push once  dextrose 50% Injectable 25 Gram(s) IV Push once  dextrose 50% Injectable 25 Gram(s) IV Push once  glucagon  Injectable 1 milliGRAM(s) IntraMuscular once  heparin   Injectable 5000 Unit(s) SubCutaneous every 8 hours  insulin lispro (ADMELOG) corrective regimen sliding scale   SubCutaneous every 6 hours  labetalol 600 milliGRAM(s) Oral three times a day  niCARdipine Infusion 2.5 mG/Hr (12.5 mL/Hr) IV Continuous <Continuous>  pantoprazole  Injectable 40 milliGRAM(s) IV Push two times a day  predniSONE   Tablet 40 milliGRAM(s) Oral daily  sevelamer carbonate Powder 1600 milliGRAM(s) Oral three times a day    MEDICATIONS  (PRN):  acetaminophen   Oral Liquid .. 650 milliGRAM(s) Oral every 6 hours PRN Temp greater or equal to 38C (100.4F), Mild Pain (1 - 3)  dextrose Oral Gel 15 Gram(s) Oral once PRN Blood Glucose LESS THAN 70 milliGRAM(s)/deciliter      Vital Signs Last 24 Hrs  T(F): 97.4 (08-26-23 @ 20:40), Max: 98.8 (08-26-23 @ 04:00)  HR: 72 (08-26-23 @ 20:40) (60 - 76)  BP: --  RR: 14 (08-26-23 @ 20:40) (11 - 17)  SpO2: 100% (08-26-23 @ 20:40) (100% - 100%)  Telemetry:   CAPILLARY BLOOD GLUCOSE      POCT Blood Glucose.: 167 mg/dL (26 Aug 2023 18:05)  POCT Blood Glucose.: 165 mg/dL (26 Aug 2023 11:57)  POCT Blood Glucose.: 154 mg/dL (26 Aug 2023 05:55)  POCT Blood Glucose.: 178 mg/dL (25 Aug 2023 23:32)    I&O's Summary    25 Aug 2023 07:01  -  26 Aug 2023 07:00  --------------------------------------------------------  IN: 943 mL / OUT: 1310 mL / NET: -367 mL    26 Aug 2023 07:01  -  26 Aug 2023 20:58  --------------------------------------------------------  IN: 586.2 mL / OUT: 500 mL / NET: 86.2 mL        PHYSICAL EXAM:  GENERAL: NAD, well-developed  HEAD:  Atraumatic, Normocephalic  EYES: EOMI, PERRLA, conjunctiva and sclera clear  NECK: Supple, No JVD  CHEST/LUNG: Clear to auscultation bilaterally; No wheeze  HEART: Regular rate and rhythm; No murmurs, rubs, or gallops  ABDOMEN: Soft, Nontender, Nondistended; Bowel sounds present  EXTREMITIES:  2+ Peripheral Pulses, No clubbing, cyanosis, or edema  PSYCH: AAOx3  NEUROLOGY: non-focal  SKIN: No rashes or lesions    LABS:                        7.1    9.57  )-----------( 340      ( 26 Aug 2023 19:25 )             22.1     08-26    136  |  99  |  98<H>  ----------------------------<  177<H>  4.6   |  19<L>  |  3.33<H>    Ca    7.5<L>      26 Aug 2023 19:25  Phos  9.3     08-26  Mg     2.20     08-26    TPro  6.0  /  Alb  2.7<L>  /  TBili  0.3  /  DBili  x   /  AST  36<H>  /  ALT  43<H>  /  AlkPhos  164<H>  08-25    PT/INR - ( 26 Aug 2023 02:15 )   PT: 14.1 sec;   INR: 1.26 ratio         PTT - ( 26 Aug 2023 02:15 )  PTT:35.3 sec      Urinalysis Basic - ( 26 Aug 2023 19:25 )    Color: x / Appearance: x / SG: x / pH: x  Gluc: 177 mg/dL / Ketone: x  / Bili: x / Urobili: x   Blood: x / Protein: x / Nitrite: x   Leuk Esterase: x / RBC: x / WBC x   Sq Epi: x / Non Sq Epi: x / Bacteria: x        RADIOLOGY & ADDITIONAL TESTS:    Imaging Personally Reviewed:    Consultant(s) Notes Reviewed:      Care Discussed with Consultants/Other Providers:

## 2023-08-26 NOTE — PROGRESS NOTE ADULT - SUBJECTIVE AND OBJECTIVE BOX
Subjective: Patient seen and examined. No new events except as noted.   Remains in ICU.    REVIEW OF SYSTEMS:  Unable to obtain.    MEDICATIONS:  MEDICATIONS  (STANDING):  albuterol/ipratropium for Nebulization 3 milliLiter(s) Nebulizer every 8 hours  atorvastatin 40 milliGRAM(s) Oral at bedtime  chlorhexidine 0.12% Liquid 15 milliLiter(s) Oral Mucosa every 12 hours  chlorhexidine 2% Cloths 1 Application(s) Topical daily  dexMEDEtomidine Infusion 0.1 MICROgram(s)/kG/Hr (1.66 mL/Hr) IV Continuous <Continuous>  dextrose 5%. 1000 milliLiter(s) (50 mL/Hr) IV Continuous <Continuous>  dextrose 5%. 1000 milliLiter(s) (100 mL/Hr) IV Continuous <Continuous>  dextrose 50% Injectable 12.5 Gram(s) IV Push once  dextrose 50% Injectable 25 Gram(s) IV Push once  dextrose 50% Injectable 25 Gram(s) IV Push once  glucagon  Injectable 1 milliGRAM(s) IntraMuscular once  heparin   Injectable 5000 Unit(s) SubCutaneous every 8 hours  insulin lispro (ADMELOG) corrective regimen sliding scale   SubCutaneous every 6 hours  insulin NPH human recombinant 18 Unit(s) SubCutaneous every 6 hours  labetalol 600 milliGRAM(s) Oral three times a day  pantoprazole  Injectable 40 milliGRAM(s) IV Push two times a day  predniSONE   Tablet 40 milliGRAM(s) Oral daily    PHYSICAL EXAM:  Vital Signs Last 24 Hrs  T(C): 36.5 (26 Aug 2023 08:00), Max: 37.1 (26 Aug 2023 04:00)  T(F): 97.7 (26 Aug 2023 08:00), Max: 98.8 (26 Aug 2023 04:00)  HR: 61 (26 Aug 2023 11:13) (60 - 76)  BP: --  BP(mean): --  RR: 15 (26 Aug 2023 10:00) (12 - 21)  SpO2: 100% (26 Aug 2023 11:13) (100% - 100%)    Parameters below as of 26 Aug 2023 10:00  Patient On (Oxygen Delivery Method): ventilator    O2 Concentration (%): 30    I&O's Summary    25 Aug 2023 07:01  -  26 Aug 2023 07:00  --------------------------------------------------------  IN: 943 mL / OUT: 1310 mL / NET: -367 mL    26 Aug 2023 07:01  -  26 Aug 2023 11:21  --------------------------------------------------------  IN: 149 mL / OUT: 170 mL / NET: -21 mL    Appearance: NAD, intubated, arousable/resp[onsive  HEENT: dry oral mucosa, [facility] AMBROCIO   Lymphatic: No lymphadenopathy  Cardiovascular: Normal S1 S2, No JVD, No murmurs, No edema  Respiratory: Decreased BS, intubated on ventilator	  Psychiatry: A & O x 1  Gastrointestinal: Soft, Non-tender, + BS, + OGT	  Skin: No rashes, No ecchymoses, No cyanosis	  Extremities: No strength/ROM 2/2 sedation, + BL LE edema  Vascular: Peripheral pulses palpable 2+ bilaterally  +willard    LABS:    CARDIAC MARKERS:                        7.4    10.34 )-----------( 341      ( 26 Aug 2023 02:15 )             23.2     08-26    136  |  100  |  96<H>  ----------------------------<  168<H>  4.5   |  18<L>  |  3.19<H>    Ca    7.6<L>      26 Aug 2023 02:15  Phos  8.8     08-26  Mg     2.30     08-26    TPro  6.0  /  Alb  2.7<L>  /  TBili  0.3  /  DBili  x   /  AST  36<H>  /  ALT  43<H>  /  AlkPhos  164<H>  08-25    proBNP:   Lipid Profile:   HgA1c:   TSH:     TELEMETRY: SR    ECG:  	  RADIOLOGY:   DIAGNOSTIC TESTING:  [ ] Echocardiogram:  [ ]  Catheterization:  [ ] Stress Test:    OTHER:

## 2023-08-26 NOTE — PROGRESS NOTE ADULT - ASSESSMENT
74 y/o F well known to me from my [facility]. she was admitted at [facility] 7/12-7/22 w aspiration PNA, was treated w CEFEPIME, developed an allergic rash,  dCHF, + MAC on AFB culture, had been progressively getting more and more lethargic and dyspneic at home since DC.   In  am of 8/11/23  ptn presented with respiratory distress w hypoxia and hypercarbia requiring intubation 2/2 volume overload +/- Asp PNA      Neuro   responds appropriately while intubated  Baseline MS AOx3, aphasic   - h/o CVA , on aspirin and statin . 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 probably embolic in nature, did not tolerate full AC in the past, STEPHANIE is neg , no shunts observed      Cardiac   Ptn well known to Dr. Dunn, consult reviewed   CHFpEF   TTE 7/2023 with EF 59%, with severe LVH and diastolic dysfunction   pro-BNP > 07870, trop 78   on cardine drip for bp control and labetalol po tid 600 mg, clonidine raised to 0.2 mg tid     Pulmonary   Acute hypercapnic and hypoxemic respiratory failure   well known to Dr. Mcnulty, consult reviewed   DDx: aspiration vs volume overload   VBG with respiratory acidosis pCO2 111  CT chest: mod ( worsening) R pl effusion, h/o RUL cavity, +MAC  - cefepime DCed on 8/13, started on Moxifloxacin on 8/13, since 8/14 off ABx  - weaned weaned off the vent w cpap    Renal   Hyperkalemia with EKG changes  , initially treated w LOKELMA,  now resolved   Ptn well know to Dr. Colon, consult reviewed      worsening renal function, oliguric, fluid overloaded, on Bumex drip, worsening azotemia, may need HD, renal following. BP elevated, on labetalol cardene, clonidine, on empiric steroids for AIN ,   started HD 8/19, and 8/21,   HD as per renal  on bumex  drip   renal biopsy has been deferred , ASA  resumed, AC on hold    GI  NPO for now, on tube feeds  coffee ground emesis x 1 8/24, now on IV PPI,     Endocrine  T2DM   A1C 7.1 in 7/2023   - BG goal 140-200     ID   No fever/leukocytosis, recheck temp   Hx latent TB which was treated, no concern for TB   - grew MAC on AFB culture from most recent admission. would call ID consult  Started on empiric vancomycin and aztreonam,  changed to cefepime 8/12, cefepime dced on 8/13(cefepime/zosyn allergy.  developed rash when on cefepime on previous admission ) . started on AVALOX on 8/13, off ABx on 8/14. ptn has an allergic rash, prob 2/2 cefepime  - f/u MRSA   - all cxs NTD    Heme/Onc  ppx: heparin q8 hrs     Ethics  GOC - Discussed GOC with daughter and , they have opted in the past for full code. and she remains full code at present

## 2023-08-26 NOTE — PROGRESS NOTE ADULT - SUBJECTIVE AND OBJECTIVE BOX
Date of Service: 08-26-23 @ 13:13    Patient is a 73y old  Female who presents with a chief complaint of Respiratory distress (26 Aug 2023 11:20)      Any change in ROS: seems same to me: no sob/;  on cpap trials: not fully alert and awake:  on presssors    MEDICATIONS  (STANDING):  albuterol/ipratropium for Nebulization 3 milliLiter(s) Nebulizer every 8 hours  aspirin  chewable 81 milliGRAM(s) Oral daily  atorvastatin 40 milliGRAM(s) Oral at bedtime  buMETAnide Infusion 2 mG/Hr (10 mL/Hr) IV Continuous <Continuous>  chlorhexidine 0.12% Liquid 15 milliLiter(s) Oral Mucosa every 12 hours  chlorhexidine 2% Cloths 1 Application(s) Topical daily  cloNIDine 0.2 milliGRAM(s) Oral every 8 hours  dexMEDEtomidine Infusion 1 MICROgram(s)/kG/Hr (16.6 mL/Hr) IV Continuous <Continuous>  dextrose 5%. 1000 milliLiter(s) (50 mL/Hr) IV Continuous <Continuous>  dextrose 5%. 1000 milliLiter(s) (100 mL/Hr) IV Continuous <Continuous>  dextrose 50% Injectable 12.5 Gram(s) IV Push once  dextrose 50% Injectable 25 Gram(s) IV Push once  dextrose 50% Injectable 25 Gram(s) IV Push once  glucagon  Injectable 1 milliGRAM(s) IntraMuscular once  heparin   Injectable 5000 Unit(s) SubCutaneous every 8 hours  insulin lispro (ADMELOG) corrective regimen sliding scale   SubCutaneous every 6 hours  labetalol 600 milliGRAM(s) Oral three times a day  niCARdipine Infusion 2.5 mG/Hr (12.5 mL/Hr) IV Continuous <Continuous>  pantoprazole  Injectable 40 milliGRAM(s) IV Push two times a day  predniSONE   Tablet 40 milliGRAM(s) Oral daily  sevelamer carbonate Powder 1600 milliGRAM(s) Oral three times a day    MEDICATIONS  (PRN):  acetaminophen   Oral Liquid .. 650 milliGRAM(s) Oral every 6 hours PRN Temp greater or equal to 38C (100.4F), Mild Pain (1 - 3)  dextrose Oral Gel 15 Gram(s) Oral once PRN Blood Glucose LESS THAN 70 milliGRAM(s)/deciliter    Vital Signs Last 24 Hrs  T(C): 36.5 (26 Aug 2023 08:00), Max: 37.1 (26 Aug 2023 04:00)  T(F): 97.7 (26 Aug 2023 08:00), Max: 98.8 (26 Aug 2023 04:00)  HR: 61 (26 Aug 2023 11:13) (60 - 76)  BP: --  BP(mean): --  RR: 12 (26 Aug 2023 11:00) (12 - 21)  SpO2: 100% (26 Aug 2023 11:13) (100% - 100%)    Parameters below as of 26 Aug 2023 11:00  Patient On (Oxygen Delivery Method): ventilator    O2 Concentration (%): 30  Mode: AC/ CMV (Assist Control/ Continuous Mandatory Ventilation)  RR (machine): 12  TV (machine): 360  FiO2: 30  PEEP: 5  ITime: 0.89  MAP: 9  PIP: 27    I&O's Summary    25 Aug 2023 07:01  -  26 Aug 2023 07:00  --------------------------------------------------------  IN: 943 mL / OUT: 1310 mL / NET: -367 mL    26 Aug 2023 07:01  -  26 Aug 2023 13:13  --------------------------------------------------------  IN: 149 mL / OUT: 170 mL / NET: -21 mL          Physical Exam:   GENERAL: NAD, well-groomed, well-developed  HEENT: MANDY/   Atraumatic, Normocephalic  ENMT: No tonsillar erythema, exudates, or enlargement; Moist mucous membranes, Good dentition, No lesions  NECK: Supple, No JVD, Normal thyroid  CHEST/LUNG: Clear to auscultaion  CVS: Regular rate and rhythm; No murmurs, rubs, or gallops  GI: : Soft, Nontender, Nondistended; Bowel sounds present  NERVOUS SYSTEM:  awake but letahrgic  EXTREMITIES:  - +edema  LYMPH: No lymphadenopathy noted  SKIN: No rashes or lesions  ENDOCRINOLOGY: No Thyromegaly  PSYCH:letahrgic    Labs:  [facility] - ( 26 Aug 2023 02:15 )  pH, Arterial: 7.32  pH, Blood: x     /  pCO2: 38    /  pO2: 114   / HCO3: 20    / Base Excess: -6.0  /  SaO2: 99.0                                        7.4    10.34 )-----------( 341      ( 26 Aug 2023 02:15 )             23.2                         7.8    14.96 )-----------( 345      ( 25 Aug 2023 01:30 )             24.2                         7.5    18.82 )-----------( 331      ( 24 Aug 2023 15:20 )             24.0                         7.6    15.83 )-----------( 331      ( 24 Aug 2023 01:50 )             23.7                         7.7    16.96 )-----------( 314      ( 23 Aug 2023 00:05 )             24.0     08-26    136  |  100  |  96<H>  ----------------------------<  168<H>  4.5   |  18<L>  |  3.19<H>  08-25    134<L>  |  99  |  93<H>  ----------------------------<  155<H>  4.5   |  19<L>  |  3.08<H>  08-25    136  |  99  |  87<H>  ----------------------------<  144<H>  4.4   |  22  |  2.97<H>  08-24    135  |  100  |  80<H>  ----------------------------<  144<H>  4.3   |  22  |  2.82<H>  08-24    135  |  100  |  78<H>  ----------------------------<  186<H>  4.0   |  21<L>  |  2.66<H>  08-23    133<L>  |  99  |  65<H>  ----------------------------<  232<H>  4.1   |  25  |  2.34<H>    Ca    7.6<L>      26 Aug 2023 02:15  Ca    7.5<L>      25 Aug 2023 15:30  Ca    7.6<L>      25 Aug 2023 01:30  Ca    7.7<L>      24 Aug 2023 15:20  Phos  8.8     08-26  Phos  7.6     08-25  Phos  7.7     08-24  Mg     2.30     08-26  Mg     2.30     08-25  Mg     2.30     08-24    TPro  6.0  /  Alb  2.7<L>  /  TBili  0.3  /  DBili  x   /  AST  36<H>  /  ALT  43<H>  /  AlkPhos  164<H>  08-25  TPro  6.1  /  Alb  2.5<L>  /  TBili  0.3  /  DBili  x   /  AST  59<H>  /  ALT  51<H>  /  AlkPhos  176<H>  08-24  TPro  6.2  /  Alb  2.6<L>  /  TBili  0.3  /  DBili  x   /  AST  41<H>  /  ALT  39<H>  /  AlkPhos  164<H>  08-24  TPro  6.1  /  Alb  2.7<L>  /  TBili  0.3  /  DBili  x   /  AST  36<H>  /  ALT  29  /  AlkPhos  159<H>  08-23    CAPILLARY BLOOD GLUCOSE      POCT Blood Glucose.: 165 mg/dL (26 Aug 2023 11:57)  POCT Blood Glucose.: 154 mg/dL (26 Aug 2023 05:55)  POCT Blood Glucose.: 178 mg/dL (25 Aug 2023 23:32)  POCT Blood Glucose.: 149 mg/dL (25 Aug 2023 18:33)      LIVER FUNCTIONS - ( 25 Aug 2023 01:30 )  Alb: 2.7 g/dL / Pro: 6.0 g/dL / ALK PHOS: 164 U/L / ALT: 43 U/L / AST: 36 U/L / GGT: x           PT/INR - ( 26 Aug 2023 02:15 )   PT: 14.1 sec;   INR: 1.26 ratio         PTT - ( 26 Aug 2023 02:15 )  PTT:35.3 sec  Urinalysis Basic - ( 26 Aug 2023 02:15 )    Color: x / Appearance: x / SG: x / pH: x  Gluc: 168 mg/dL / Ketone: x  / Bili: x / Urobili: x   Blood: x / Protein: x / Nitrite: x   Leuk Esterase: x / RBC: x / WBC x   Sq Epi: x / Non Sq Epi: x / Bacteria: x        d< from: Xray Chest 1 View- PORTABLE-Urgent (Xray Chest 1 View- PORTABLE-Urgent .) (08.24.23 @ 18:23) >    ACC: [id number] EXAM:  XR CHEST PORTABLE URGENT 1V   ORDERED BY: SARAH DEAL     PROCEDURE DATE:  08/24/2023          INTERPRETATION:  CLINICAL INFORMATION: New onset coffee-ground emesis.    TECHNIQUE: One view of the chest.    COMPARISON: Radiograph chest 8/19/2023.    FINDINGS:  Extremely limited evaluation secondary to patient positioning.    Enteric tube is present.    IMPRESSION:  On this limited study, an enteric tube is present.    --- End of Report ---          CHANCE BLACKBURN MD; Resident Radiologist  This document has been electronically signed.  JERRI FUNK DO; Attending Radiologist  This document has been electronically signed. Aug 25 2023 10:26AM    < end of copied text >  < from: Xray Chest 1 View- PORTABLE-Urgent (Xray Chest 1 View- PORTABLE-Urgent .) (08.19.23 @ 18:37) >    ACC: [id number] EXAM:  XR CHEST PORTABLE URGENT 1V   ORDERED BY: JAMIL SANCHEZ     PROCEDURE DATE:  08/19/2023          INTERPRETATION:  EXAMINATION: XR CHEST URGENT    CLINICAL INDICATION: Line Placement    TECHNIQUE: Single frontal, portable view of the chest was obtained.    COMPARISON: Chest x-ray 8/13/2023.    FINDINGS:  Right IJ Shiley in place with catheter tip at the level of the lower SVC.  The heart is normal in size. Cardiac loop recorder in place.  Left jugular central line reaches the upper SVC.  Bilateral pulmonary edema improved from prior study. Right apical pleural   thickening unchanged from prior study.  There is no pneumothorax or pleural effusion.  No acute bony abnormality.    IMPRESSION:  Right IJ Shiley in place with catheter tip at the level of lower SVC.    --- End of Report ---          KEELY JOSE MD; Resident Radiologist  This document has been electronically signed.  KHALIDA JENSEN MD; Attending Interventional Radiologist  This document has been electronically signed. Aug 22 2023 12:29PM    < end of copied text >      RECENT CULTURES:        RESPIRATORY CULTURES:          Studies  Chest X-RAY  CT SCAN Chest   Venous Dopplers: LE:   CT Abdomen  Others

## 2023-08-26 NOTE — PROGRESS NOTE ADULT - ASSESSMENT
72 y/o F DM on insulin, HTN, CKD, CHFpEF, breast CA, respiratory arrest and cardiac arrest (2018), CVA with residual weakness, aspiration PNA h/o trache, PEG, both removed presents with respiratory distress requiring intubation. May be volume overload i/s/o CHF/CKD vs decrease respiratory drive (given oxygen at home). Improving mental status however worsening renal function and anuria, concerning for ATN vs AIN.     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 No Hx Afib or documented   STEPHANIE with no TRINITY thrombus or intracardiac shunts   - avoid hypertension with BP < 180 systolic   - holding ASA for possible kidney biopsy planned for 8/22   -- 8/23: Bx deferred, restart ASA?  -- 8/24: restart ASA  - c/w statin   - 8/22: wean sedation as tolerated    Cardiac   #Hypertension  - 8/23: Labetalol 500 off cardene gtt, wean cardene as tolerated  - 8/24: increase Labetalol to 600 starting with the 2pm dose  - 8/25: Clonidine 0.1 bid, goal SBP<140, restart cardene gtt goal SBP<180    #Shock  #Hypertension   - Initially vasoplegic from prop  - weaned vaso and levo, now hypertensive likely from fluid overload with renal dysfunction   > changed coreg to labetalol and uptitrated to 400 TID for BP control given CVA   > on nitro drip     #CHFpEF   TTE 7/2023 with EF 59%, with severe LVH and diastolic dysfunction   pro-BNP > 29608, trop 78   Repeat TTE with EF 60% normal systolic and grade 1 diastolic dysfunction     Pulmonary   #Acute hypercapnic and hypoxemic respiratory failure   DDx: aspiration vs volume overload vs sedating medication decreasing drive (was given nyquil)   VBG with respiratory acidosis pCO2 111  CXR with small right pleural effusion, no consolidations/opacities  > c/w mechanical ventilation   > PS trials, plan for possible extubation 8/21  > monitor blood gas while on vent    - 8/21: d/c hypertonic saline  - 8/24:   -- CPAP changed to 5/5 PSV/PEEP  -- AM ABG, f/u and if fine, d/c precedex and extubate to BiPAP  - 8/25: CPAP trials    /Renal   #RUSS on CKD  Ddx: obstructive (willard in, no hydro on POCUS) vs low flow state vs AIN i/s/o cephalosporin use and drug rash   Urinalysis with 7 WBC, would not pursue steroids at this time per nephro  Worsening creatinine still but no urgent indication for HD, ATN   - kidney biopsy to r/o AIN planned 8/22   - cont empiric prednisone 40mg started 8/18   - trialed diuretics without improvement   - Continue to monitor, dose meds per eGFR. avoid nephrotoxic agents  - first HD session 8/19, will plan for additional fluid removal or HD sessions with nephro before extubation  - 8/22: IR kidney Bx, f/u results  - 8/23: Bx deferred  -- Bx non-urgent per nephro  -- IR counseled proxy while obtaining consent but proxy unable to decide  -- Strict IOs per nephro  -- Willard  - 8/24: Bumex 4 goal of net negative I/Os  -- hold HD per nephro  -- Consider pred taper 8/25  - 8/25: start Bumex gtt 1mg/h. Per nephro, dialyze 8/26 if Bumex gtt fails to shift IO to net negative.     #Hyperkalemia with EKG changes    7.2, hemolyzed, given insulin and D50 + calcium gluc   Likely i/s/o renal dysfunction   Received lasix 80 and 40 IV initially but without adequate response   Given bumex 2mg the morning of 8/12  Per nephro, started on bumex gtt with worsening creatinine   - decreasing urine output, now oliguric   - off bumex gtt     GI  Diet: NPO  - 8/23: Bx deferred, restart TF?  - 8/24: NPO, restart TF if extubation performed    Endocrine  #T2DM   A1C 7.1 in 7/2023   - c/w NPH to 14U q6 with mod SS  -- 8/21: If PM FS elevated, increase to 16U  -- 8/22: NPH 18U  - BG goal 140-200   -- 8/23: If NPO, sliding scale but hold NPH + permissive transient hyperglycemia.  -- 8/25: d/c NPH order    ID   No fever/leukocytosis, recheck temp   Hx latent TB which was treated, no concern for TB   Started on empiric vancomycin and aztreonam (cefepime/zosyn allergy? developed rash req prednisone)   Trialed cefepime --> cefazolin (sputum MSSA), developed drug eruption - discontinued   Blood and urine cultures 8/11 demonstrating no growth currently  Rising leukocytosis with low grade temp  - start empiric antibx if febrile > 101  - BC NGTD    Heme/Onc  ppx: heparin q8 hrs   8/21: Coag Panel for IR/kidney bx 8/22 8/22: confirm with IR regarding restarting DVT PPx within 12-24h s/p IR kidney Bx  8/23: AC contingent on Bx   -- Restart SQH 5000    Ethics  GOC - Per previous GOC with daughter and , reported that they have not talked about end of life care with the patient. For now, they wanted "everything to be done" including CPR, continuing with mech ventilation/intubation, pressors   72 y/o F DM on insulin, HTN, CKD, CHFpEF, breast CA, respiratory arrest and cardiac arrest (2018), CVA with residual weakness, aspiration PNA h/o trache, PEG, both removed presents with respiratory distress requiring intubation. May be volume overload i/s/o CHF/CKD vs decrease respiratory drive (given oxygen at home). Improving mental status however worsening renal function and anuria, concerning for ATN vs AIN.     Neuro   #AMS  #Metabolic encephalopathy   #CVA   Baseline MS AOx3 with short term memory changes per family  - MRI brain 8/17 showed right cerebellar infarct (new) with old left PCA/occipital infarcts, likely embolic in nature - STEPHANIE with no TRINITY thrombus or intracardiac shunts  - Now with mild sedation with Precedex for ventilator synchrony  - Intermittent shaking noted, per family happens at baseline but more pronounced here; EEG without epileptiform activity, per neuro no need for LP  - Restarted aspirin 8/26    Cardiac   #Hypertension; SBP max 200s ? iso renal dysfunction/overlaod, now 130s-140s  - Labetalol 600 TID - will hold off on further increases to prevent bradycardia  - On cardene and bumex drips - will increase clonidine to 0.2 TID to help wean cardene    #Shock  - Resolved     #CHFpEF   - TTE 7/2023 with EF 59%, with severe LVH and diastolic dysfunction   - pro-BNP > 15401, trop 78   - Repeat TTE with EF 60% normal systolic and grade 1 diastolic dysfunction     Pulmonary   #Acute hypercapnic and hypoxemic respiratory failure   - DDx: aspiration vs volume overload vs sedating medication decreasing drive (was given nyquil)   - Previously did not tolerate CPAP trials  - Will reattempt CPAP trial today, in preparation for extubation  - If fails further SBT trials, may need trach    /Renal   #RUSS on CKD  - Ddx: obstructive (willard in, no hydro on POCUS) vs low flow state vs AIN i/s/o cephalosporin use and drug rash   - kidney biopsy to r/o AIN planned 8/22, however family felt procedure too risky and so deferred  - cont empiric prednisone 40mg started 8/18   - Continue to monitor, dose meds per eGFR. avoid nephrotoxic agents  - first HD session 8/19, will plan for additional fluid removal or HD sessions with nephro before extubation  - Strict IOs per nephro; Willard in place for UOP monitoring  - On Bumex drip  - Will follow up with nephro whether further HD planned for today    # Hyperphosphatemia  - Continue to monitor BMP q12  - Started on renvela powder TID per nephro recs    GI  - Had one episode of coffee ground emesis 8/24, hemoglobin stable  - GI consulted, no plan for scope  - Continue PPI IV BID, will transition to PO post extubation  - Restarted tube feeds    Endocrine  #T2DM   - ISS  - NPH DC'd while NPO, will restart if BG increase now that tube feeds are started    ID   Started on empiric vancomycin and aztreonam (cefepime/zosyn allergy? developed rash req prednisone)   Trialed cefepime --> cefazolin (sputum MSSA), developed drug eruption - discontinued   Blood and urine cultures 8/11 demonstrating no growth currently, no clinical signs of infection  - Monitor off antibiotics    Heme/Onc  ppx: heparin q8 hrs     Ethics  GOC - Per previous GOC with daughter and , reported that they have not talked about end of life care with the patient. For now, they wanted "everything to be done" including CPR, continuing with mech ventilation/intubation, pressors

## 2023-08-26 NOTE — PROGRESS NOTE ADULT - SUBJECTIVE AND OBJECTIVE BOX
on Bumex gtt at 2 mg/hour   urine output 1.3 L this am, net negative 300 cc only  Only nicardipine gtt       VITAL:  T(C): , Max: 37.1 (08-26-23 @ 04:00)  T(F): , Max: 98.8 (08-26-23 @ 04:00)  HR: 66 (08-26-23 @ 15:03)  BP: --  BP(mean): --  RR: 15 (08-26-23 @ 15:00)  SpO2: 100% (08-26-23 @ 15:03)  Wt(kg): --      PHYSICAL EXAM:  General: Alerted, oriented  HEENT: MMM  Lungs:CTA-b/l  Heart: RRR S1S2  Abdomen: Soft, NTND  Ext: no pedal edema b/l  :  willard  vascular Access - R internal jugular vasc cath     LABS:                        7.4    10.34 )-----------( 341      ( 26 Aug 2023 02:15 )             23.2     Na(136)/K(4.5)/Cl(100)/HCO3(18)/BUN(96)/Cr(3.19)Glu(168)/Ca(7.6)/Mg(2.30)/PO4(8.8)    08-26 @ 02:15  Na(134)/K(4.5)/Cl(99)/HCO3(19)/BUN(93)/Cr(3.08)Glu(155)/Ca(7.5)/Mg(--)/PO4(--)    08-25 @ 15:30  Na(136)/K(4.4)/Cl(99)/HCO3(22)/BUN(87)/Cr(2.97)Glu(144)/Ca(7.6)/Mg(2.30)/PO4(7.6)    08-25 @ 01:30  Na(135)/K(4.3)/Cl(100)/HCO3(22)/BUN(80)/Cr(2.82)Glu(144)/Ca(7.7)/Mg(2.30)/PO4(7.7)    08-24 @ 15:20  Na(135)/K(4.0)/Cl(100)/HCO3(21)/BUN(78)/Cr(2.66)Glu(186)/Ca(7.9)/Mg(2.40)/PO4(6.8)    08-24 @ 01:50    Urinalysis Basic - ( 26 Aug 2023 02:15 )    Color: x / Appearance: x / SG: x / pH: x  Gluc: 168 mg/dL / Ketone: x  / Bili: x / Urobili: x   Blood: x / Protein: x / Nitrite: x   Leuk Esterase: x / RBC: x / WBC x   Sq Epi: x / Non Sq Epi: x / Bacteria: x         73F w/ HTN, DM2, CVA, breast CA-bilateral mastectomy, recurrent aspiration pneumonia/respiratory failure, and CKD, 8/11/23 p/w acute hypercapnic respiratory failure; c/b RUSS    (1)Renal - CKD4     (2)RUSS - Nonoliguric, but remains overloaded. Creatinine appears to be plateauing at around 3mg/dL, corresponding with GFR ~15ml/min. Ischemic ATN, vs AIN. On Prednisone 40mg po qd given possibility of AIN.    (3)Hyperphosphatemia - higher     (4)Pulm- hypercapnic respiratory failure on admission - remains intubated    (5)CV - BP controlled on nicardipine gtt         RECOMMEND:  c/w Bumex 2mg/h gtt- DC when HD is started   HD today in view of rising azotemia and volume overload - making urine but not in significant negative balance   Prednisone 40mg po qd x 2 week course, then taper down  c/w  Renvela powder, 1600mg TID via OGT  Dose new meds for GFR ~10 ml/min    d/w'd MICU team           Monica Danielle MD  [facility]  Office: [phone]

## 2023-08-26 NOTE — PROGRESS NOTE ADULT - ATTENDING COMMENTS
Patient is a 74 yo F w/ T2DM, CKD, HFpEF, h/o CVA and trach and PEG (now removed) admitted to MICU for acute hypoxemic and hypercapnic respiratory failure requiring intubation in setting of RUSS and acute pulmonary edema, possible ATN vs AIN    # Acute hypoxemic and hypercapnic respiratory failure  # RUSS/ATN/AIN  # Acute pulmonary edema  # HTN  - c/w mechanical ventilatory support, wean as tolerated. Tolerating PSV 10/5 today  - Wean precedex as tolerated  - c/w labetalol, increase clonidine. Wean Cardizem gtt as toelrated  - c/w bumex gtt but only net negative 400, will discuss w/ renal re HD  - c/w prednisone for possible AIN  - Adjust insulin    #Hematemesis - No further coffee ground in NGT  #R cerbella CVA  - Resume feeds as per GI  - Resume ASA  - Trend CBC

## 2023-08-26 NOTE — PROGRESS NOTE ADULT - SUBJECTIVE AND OBJECTIVE BOX
Gastroenterology/Hepatology Progress Note    Interval Events: Patient has not had a BM for a number of days. No abdominal pain, nausea, vomiting overnight. She remains intubated as of this morning but is awake. Hgb stable.    Allergies:  isoniazid (Rash)  nafcillin (Unknown)  hydrALAZINE (Rash)  vitamin E (Short breath; Urticaria; Hives)  doxycycline (Rash)  cefepime (Rash)  NIFEdipine (Urticaria; Hives)    Hospital Medications:  acetaminophen   Oral Liquid .. 650 milliGRAM(s) Oral every 6 hours PRN  albuterol/ipratropium for Nebulization 3 milliLiter(s) Nebulizer every 8 hours  atorvastatin 40 milliGRAM(s) Oral at bedtime  buMETAnide Infusion 2 mG/Hr IV Continuous <Continuous>  chlorhexidine 0.12% Liquid 15 milliLiter(s) Oral Mucosa every 12 hours  chlorhexidine 2% Cloths 1 Application(s) Topical daily  cloNIDine 0.1 milliGRAM(s) Oral every 8 hours  dexMEDEtomidine Infusion 1 MICROgram(s)/kG/Hr IV Continuous <Continuous>  dextrose 5%. 1000 milliLiter(s) IV Continuous <Continuous>  dextrose 5%. 1000 milliLiter(s) IV Continuous <Continuous>  dextrose 50% Injectable 12.5 Gram(s) IV Push once  dextrose 50% Injectable 25 Gram(s) IV Push once  dextrose 50% Injectable 25 Gram(s) IV Push once  dextrose Oral Gel 15 Gram(s) Oral once PRN  glucagon  Injectable 1 milliGRAM(s) IntraMuscular once  heparin   Injectable 5000 Unit(s) SubCutaneous every 8 hours  insulin lispro (ADMELOG) corrective regimen sliding scale   SubCutaneous every 6 hours  labetalol 600 milliGRAM(s) Oral three times a day  niCARdipine Infusion 2.5 mG/Hr IV Continuous <Continuous>  pantoprazole  Injectable 40 milliGRAM(s) IV Push two times a day  predniSONE   Tablet 40 milliGRAM(s) Oral daily  sevelamer carbonate Powder 1600 milliGRAM(s) Oral three times a day      ROS: 14 point ROS negative unless otherwise state in subjective    PHYSICAL EXAM:   Vital Signs:  Vital Signs Last 24 Hrs  T(C): 37.1 (26 Aug 2023 04:00), Max: 37.1 (26 Aug 2023 04:00)  T(F): 98.8 (26 Aug 2023 04:00), Max: 98.8 (26 Aug 2023 04:00)  HR: 63 (26 Aug 2023 08:00) (60 - 76)  BP: --  BP(mean): --  RR: 12 (26 Aug 2023 08:00) (12 - 21)  SpO2: 100% (26 Aug 2023 08:00) (100% - 100%)    Parameters below as of 26 Aug 2023 08:00  Patient On (Oxygen Delivery Method): ventilator, AC 12, tv 360, peep+5    O2 Concentration (%): 30  Daily     Daily Weight in k (26 Aug 2023 04:00)    GENERAL:  No acute distress, intubated  HEENT:  NCAT, no scleral icterus  CHEST: no resp distress  HEART:  RRR  ABDOMEN:  Soft, non-tender, non-distended   EXTREMITIES:  No cyanosis, clubbing, or edema  SKIN:  No rash/erythema/ecchymoses   NEURO:  Awkake    LABS:                        7.4    10.34 )-----------( 341      ( 26 Aug 2023 02:15 )             23.2     Mean Cell Volume: 91.7 fL (23 @ 02:15)        136  |  100  |  96<H>  ----------------------------<  168<H>  4.5   |  18<L>  |  3.19<H>    Ca    7.6<L>      26 Aug 2023 02:15  Phos  8.8       Mg     2.30         TPro  6.0  /  Alb  2.7<L>  /  TBili  0.3  /  DBili  x   /  AST  36<H>  /  ALT  43<H>  /  AlkPhos  164<H>      LIVER FUNCTIONS - ( 25 Aug 2023 01:30 )  Alb: 2.7 g/dL / Pro: 6.0 g/dL / ALK PHOS: 164 U/L / ALT: 43 U/L / AST: 36 U/L / GGT: x           PT/INR - ( 26 Aug 2023 02:15 )   PT: 14.1 sec;   INR: 1.26 ratio         PTT - ( 26 Aug 2023 02:15 )  PTT:35.3 sec  Urinalysis Basic - ( 26 Aug 2023 02:15 )    Color: x / Appearance: x / SG: x / pH: x  Gluc: 168 mg/dL / Ketone: x  / Bili: x / Urobili: x   Blood: x / Protein: x / Nitrite: x   Leuk Esterase: x / RBC: x / WBC x   Sq Epi: x / Non Sq Epi: x / Bacteria: x

## 2023-08-27 NOTE — PROGRESS NOTE ADULT - SUBJECTIVE AND OBJECTIVE BOX
Date of Service: 08-27-23 @ 10:50    Patient is a 73y old  Female who presents with a chief complaint of Respiratory distress (27 Aug 2023 09:24)      Any change in ROS:     MEDICATIONS  (STANDING):  albuterol/ipratropium for Nebulization 3 milliLiter(s) Nebulizer every 8 hours  aspirin  chewable 81 milliGRAM(s) Oral daily  atorvastatin 40 milliGRAM(s) Oral at bedtime  chlorhexidine 0.12% Liquid 15 milliLiter(s) Oral Mucosa every 12 hours  chlorhexidine 2% Cloths 1 Application(s) Topical daily  cloNIDine 0.2 milliGRAM(s) Oral every 8 hours  dexMEDEtomidine Infusion 1 MICROgram(s)/kG/Hr (16.6 mL/Hr) IV Continuous <Continuous>  dextrose 5%. 1000 milliLiter(s) (50 mL/Hr) IV Continuous <Continuous>  dextrose 5%. 1000 milliLiter(s) (100 mL/Hr) IV Continuous <Continuous>  dextrose 50% Injectable 25 Gram(s) IV Push once  dextrose 50% Injectable 12.5 Gram(s) IV Push once  dextrose 50% Injectable 25 Gram(s) IV Push once  glucagon  Injectable 1 milliGRAM(s) IntraMuscular once  heparin   Injectable 5000 Unit(s) SubCutaneous every 8 hours  insulin lispro (ADMELOG) corrective regimen sliding scale   SubCutaneous every 6 hours  labetalol 600 milliGRAM(s) Oral three times a day  niCARdipine Infusion 2.5 mG/Hr (12.5 mL/Hr) IV Continuous <Continuous>  pantoprazole  Injectable 40 milliGRAM(s) IV Push two times a day  predniSONE   Tablet 40 milliGRAM(s) Oral daily  sevelamer carbonate Powder 1600 milliGRAM(s) Oral three times a day    MEDICATIONS  (PRN):  acetaminophen   Oral Liquid .. 650 milliGRAM(s) Oral every 6 hours PRN Temp greater or equal to 38C (100.4F), Mild Pain (1 - 3)  dextrose Oral Gel 15 Gram(s) Oral once PRN Blood Glucose LESS THAN 70 milliGRAM(s)/deciliter    Vital Signs Last 24 Hrs  T(C): 36.3 (27 Aug 2023 04:00), Max: 36.4 (26 Aug 2023 12:00)  T(F): 97.4 (27 Aug 2023 04:00), Max: 97.6 (26 Aug 2023 12:00)  HR: 62 (27 Aug 2023 10:00) (61 - 81)  BP: --  BP(mean): --  RR: 14 (27 Aug 2023 10:00) (11 - 18)  SpO2: 100% (27 Aug 2023 10:00) (99% - 100%)    Parameters below as of 26 Aug 2023 20:40  Patient On (Oxygen Delivery Method): ventilator    O2 Concentration (%): 30  Mode: CPAP with PS  FiO2: 30  PEEP: 5  PS: 10  MAP: 8  PIP: 16    I&O's Summary    26 Aug 2023 07:01  -  27 Aug 2023 07:00  --------------------------------------------------------  IN: 1579.1 mL / OUT: 3610 mL / NET: -2030.9 mL    27 Aug 2023 07:01  -  27 Aug 2023 10:50  --------------------------------------------------------  IN: 41 mL / OUT: 0 mL / NET: 41 mL          Physical Exam:   GENERAL: NAD, well-groomed, well-developed  HEENT: MANDY/   Atraumatic, Normocephalic  ENMT: No tonsillar erythema, exudates, or enlargement; Moist mucous membranes, Good dentition, No lesions  NECK: Supple, No JVD, Normal thyroid  CHEST/LUNG: Clear to auscultaion, ; No rales, rhonchi, wheezing, or rubs  CVS: Regular rate and rhythm; No murmurs, rubs, or gallops  GI: : Soft, Nontender, Nondistended; Bowel sounds present  NERVOUS SYSTEM:  Alert & Oriented X3, Good concentration; Motor Strength 5/5 B/L upper and lower extremities; DTRs 2+ intact and symmetric  EXTREMITIES:  2+ Peripheral Pulses, No clubbing, cyanosis, or edema  LYMPH: No lymphadenopathy noted  SKIN: No rashes or lesions  ENDOCRINOLOGY: No Thyromegaly  PSYCH: Appropriate    Labs:  ABG - ( 27 Aug 2023 04:03 )  pH, Arterial: 7.35  pH, Blood: x     /  pCO2: 42    /  pO2: 99    / HCO3: 23    / Base Excess: -2.3  /  SaO2: 98.6                                        7.2    13.06 )-----------( 321      ( 27 Aug 2023 04:03 )             21.7                         7.1    9.57  )-----------( 340      ( 26 Aug 2023 19:25 )             22.1                         7.4    10.34 )-----------( 341      ( 26 Aug 2023 02:15 )             23.2                         7.8    14.96 )-----------( 345      ( 25 Aug 2023 01:30 )             24.2                         7.5    18.82 )-----------( 331      ( 24 Aug 2023 15:20 )             24.0                         7.6    15.83 )-----------( 331      ( 24 Aug 2023 01:50 )             23.7     08-27    137  |  100  |  56<H>  ----------------------------<  209<H>  3.6   |  24  |  2.19<H>  08-26    136  |  99  |  98<H>  ----------------------------<  177<H>  4.6   |  19<L>  |  3.33<H>  08-26    136  |  100  |  96<H>  ----------------------------<  168<H>  4.5   |  18<L>  |  3.19<H>  08-25    134<L>  |  99  |  93<H>  ----------------------------<  155<H>  4.5   |  19<L>  |  3.08<H>  08-25    136  |  99  |  87<H>  ----------------------------<  144<H>  4.4   |  22  |  2.97<H>  08-24    135  |  100  |  80<H>  ----------------------------<  144<H>  4.3   |  22  |  2.82<H>  08-24    135  |  100  |  78<H>  ----------------------------<  186<H>  4.0   |  21<L>  |  2.66<H>    Ca    7.8<L>      27 Aug 2023 04:03  Ca    7.5<L>      26 Aug 2023 19:25  Ca    7.6<L>      26 Aug 2023 02:15  Ca    7.5<L>      25 Aug 2023 15:30  Phos  6.2     08-27  Phos  9.3     08-26  Phos  8.8     08-26  Mg     2.00     08-27  Mg     2.20     08-26  Mg     2.30     08-26    TPro  5.7<L>  /  Alb  2.5<L>  /  TBili  0.2  /  DBili  x   /  AST  16  /  ALT  21  /  AlkPhos  142<H>  08-27  TPro  6.0  /  Alb  2.7<L>  /  TBili  0.3  /  DBili  x   /  AST  36<H>  /  ALT  43<H>  /  AlkPhos  164<H>  08-25  TPro  6.1  /  Alb  2.5<L>  /  TBili  0.3  /  DBili  x   /  AST  59<H>  /  ALT  51<H>  /  AlkPhos  176<H>  08-24  TPro  6.2  /  Alb  2.6<L>  /  TBili  0.3  /  DBili  x   /  AST  41<H>  /  ALT  39<H>  /  AlkPhos  164<H>  08-24    CAPILLARY BLOOD GLUCOSE      POCT Blood Glucose.: 228 mg/dL (27 Aug 2023 06:32)  POCT Blood Glucose.: 161 mg/dL (27 Aug 2023 00:32)  POCT Blood Glucose.: 167 mg/dL (26 Aug 2023 18:05)  POCT Blood Glucose.: 165 mg/dL (26 Aug 2023 11:57)      LIVER FUNCTIONS - ( 27 Aug 2023 04:03 )  Alb: 2.5 g/dL / Pro: 5.7 g/dL / ALK PHOS: 142 U/L / ALT: 21 U/L / AST: 16 U/L / GGT: x           PT/INR - ( 27 Aug 2023 04:03 )   PT: 13.9 sec;   INR: 1.25 ratio         PTT - ( 27 Aug 2023 04:03 )  PTT:48.7 sec  Urinalysis Basic - ( 27 Aug 2023 04:03 )    Color: x / Appearance: x / SG: x / pH: x  Gluc: 209 mg/dL / Ketone: x  / Bili: x / Urobili: x   Blood: x / Protein: x / Nitrite: x   Leuk Esterase: x / RBC: x / WBC x   Sq Epi: x / Non Sq Epi: x / Bacteria: x            RECENT CULTURES:        RESPIRATORY CULTURES:          Studies  Chest X-RAY  CT SCAN Chest   Venous Dopplers: LE:   CT Abdomen  Others               Date of Service: 08-27-23 @ 10:50    Patient is a 73y old  Female who presents with a chief complaint of Respiratory distress (27 Aug 2023 09:24)      Any change in ROS: intubated and on cpap trials    MEDICATIONS  (STANDING):  albuterol/ipratropium for Nebulization 3 milliLiter(s) Nebulizer every 8 hours  aspirin  chewable 81 milliGRAM(s) Oral daily  atorvastatin 40 milliGRAM(s) Oral at bedtime  chlorhexidine 0.12% Liquid 15 milliLiter(s) Oral Mucosa every 12 hours  chlorhexidine 2% Cloths 1 Application(s) Topical daily  cloNIDine 0.2 milliGRAM(s) Oral every 8 hours  dexMEDEtomidine Infusion 1 MICROgram(s)/kG/Hr (16.6 mL/Hr) IV Continuous <Continuous>  dextrose 5%. 1000 milliLiter(s) (50 mL/Hr) IV Continuous <Continuous>  dextrose 5%. 1000 milliLiter(s) (100 mL/Hr) IV Continuous <Continuous>  dextrose 50% Injectable 25 Gram(s) IV Push once  dextrose 50% Injectable 12.5 Gram(s) IV Push once  dextrose 50% Injectable 25 Gram(s) IV Push once  glucagon  Injectable 1 milliGRAM(s) IntraMuscular once  heparin   Injectable 5000 Unit(s) SubCutaneous every 8 hours  insulin lispro (ADMELOG) corrective regimen sliding scale   SubCutaneous every 6 hours  labetalol 600 milliGRAM(s) Oral three times a day  niCARdipine Infusion 2.5 mG/Hr (12.5 mL/Hr) IV Continuous <Continuous>  pantoprazole  Injectable 40 milliGRAM(s) IV Push two times a day  predniSONE   Tablet 40 milliGRAM(s) Oral daily  sevelamer carbonate Powder 1600 milliGRAM(s) Oral three times a day    MEDICATIONS  (PRN):  acetaminophen   Oral Liquid .. 650 milliGRAM(s) Oral every 6 hours PRN Temp greater or equal to 38C (100.4F), Mild Pain (1 - 3)  dextrose Oral Gel 15 Gram(s) Oral once PRN Blood Glucose LESS THAN 70 milliGRAM(s)/deciliter    Vital Signs Last 24 Hrs  T(C): 36.3 (27 Aug 2023 04:00), Max: 36.4 (26 Aug 2023 12:00)  T(F): 97.4 (27 Aug 2023 04:00), Max: 97.6 (26 Aug 2023 12:00)  HR: 62 (27 Aug 2023 10:00) (61 - 81)  BP: --  BP(mean): --  RR: 14 (27 Aug 2023 10:00) (11 - 18)  SpO2: 100% (27 Aug 2023 10:00) (99% - 100%)    Parameters below as of 26 Aug 2023 20:40  Patient On (Oxygen Delivery Method): ventilator    O2 Concentration (%): 30  Mode: CPAP with PS  FiO2: 30  PEEP: 5  PS: 10  MAP: 8  PIP: 16    I&O's Summary    26 Aug 2023 07:01  -  27 Aug 2023 07:00  --------------------------------------------------------  IN: 1579.1 mL / OUT: 3610 mL / NET: -2030.9 mL    27 Aug 2023 07:01  -  27 Aug 2023 10:50  --------------------------------------------------------  IN: 41 mL / OUT: 0 mL / NET: 41 mL          Physical Exam:   GENERAL: NAD, well-groomed, well-developed  HEENT: MANDY/   Atraumatic, Normocephalic  ENMT: No tonsillar erythema, exudates, or enlargement; Moist mucous membranes, Good dentition, No lesions  NECK: Supple, No JVD, Normal thyroid  CHEST/LUNG: Clear to auscultaion  CVS: Regular rate and rhythm; No murmurs, rubs, or gallops  GI: : Soft, Nontender, Nondistended; Bowel sounds present  NERVOUS SYSTEM:  awake:  intubated:   EXTREMITIES:  + edema  LYMPH: No lymphadenopathy noted  SKIN: No rashes or lesions  ENDOCRINOLOGY: No Thyromegaly  PSYCH: intubated    Labs:  ABG - ( 27 Aug 2023 04:03 )  pH, Arterial: 7.35  pH, Blood: x     /  pCO2: 42    /  pO2: 99    / HCO3: 23    / Base Excess: -2.3  /  SaO2: 98.6                                        7.2    13.06 )-----------( 321      ( 27 Aug 2023 04:03 )             21.7                         7.1    9.57  )-----------( 340      ( 26 Aug 2023 19:25 )             22.1                         7.4    10.34 )-----------( 341      ( 26 Aug 2023 02:15 )             23.2                         7.8    14.96 )-----------( 345      ( 25 Aug 2023 01:30 )             24.2                         7.5    18.82 )-----------( 331      ( 24 Aug 2023 15:20 )             24.0                         7.6    15.83 )-----------( 331      ( 24 Aug 2023 01:50 )             23.7     08-27    137  |  100  |  56<H>  ----------------------------<  209<H>  3.6   |  24  |  2.19<H>  08-26    136  |  99  |  98<H>  ----------------------------<  177<H>  4.6   |  19<L>  |  3.33<H>  08-26    136  |  100  |  96<H>  ----------------------------<  168<H>  4.5   |  18<L>  |  3.19<H>  08-25    134<L>  |  99  |  93<H>  ----------------------------<  155<H>  4.5   |  19<L>  |  3.08<H>  08-25    136  |  99  |  87<H>  ----------------------------<  144<H>  4.4   |  22  |  2.97<H>  08-24    135  |  100  |  80<H>  ----------------------------<  144<H>  4.3   |  22  |  2.82<H>  08-24    135  |  100  |  78<H>  ----------------------------<  186<H>  4.0   |  21<L>  |  2.66<H>    Ca    7.8<L>      27 Aug 2023 04:03  Ca    7.5<L>      26 Aug 2023 19:25  Ca    7.6<L>      26 Aug 2023 02:15  Ca    7.5<L>      25 Aug 2023 15:30  Phos  6.2     08-27  Phos  9.3     08-26  Phos  8.8     08-26  Mg     2.00     08-27  Mg     2.20     08-26  Mg     2.30     08-26    TPro  5.7<L>  /  Alb  2.5<L>  /  TBili  0.2  /  DBili  x   /  AST  16  /  ALT  21  /  AlkPhos  142<H>  08-27  TPro  6.0  /  Alb  2.7<L>  /  TBili  0.3  /  DBili  x   /  AST  36<H>  /  ALT  43<H>  /  AlkPhos  164<H>  08-25  TPro  6.1  /  Alb  2.5<L>  /  TBili  0.3  /  DBili  x   /  AST  59<H>  /  ALT  51<H>  /  AlkPhos  176<H>  08-24  TPro  6.2  /  Alb  2.6<L>  /  TBili  0.3  /  DBili  x   /  AST  41<H>  /  ALT  39<H>  /  AlkPhos  164<H>  08-24    CAPILLARY BLOOD GLUCOSE      POCT Blood Glucose.: 228 mg/dL (27 Aug 2023 06:32)  POCT Blood Glucose.: 161 mg/dL (27 Aug 2023 00:32)  POCT Blood Glucose.: 167 mg/dL (26 Aug 2023 18:05)  POCT Blood Glucose.: 165 mg/dL (26 Aug 2023 11:57)      LIVER FUNCTIONS - ( 27 Aug 2023 04:03 )  Alb: 2.5 g/dL / Pro: 5.7 g/dL / ALK PHOS: 142 U/L / ALT: 21 U/L / AST: 16 U/L / GGT: x           PT/INR - ( 27 Aug 2023 04:03 )   PT: 13.9 sec;   INR: 1.25 ratio         PTT - ( 27 Aug 2023 04:03 )  PTT:48.7 sec  Urinalysis Basic - ( 27 Aug 2023 04:03 )    Color: x / Appearance: x / SG: x / pH: x  Gluc: 209 mg/dL / Ketone: x  / Bili: x / Urobili: x   Blood: x / Protein: x / Nitrite: x   Leuk Esterase: x / RBC: x / WBC x   Sq Epi: x / Non Sq Epi: x / Bacteria: x    rad< from: Xray Chest 1 View- PORTABLE-Urgent (Xray Chest 1 View- PORTABLE-Urgent .) (08.24.23 @ 18:23) >        INTERPRETATION:  CLINICAL INFORMATION: New onset coffee-ground emesis.    TECHNIQUE: One view of the chest.    COMPARISON: Radiograph chest 8/19/2023.    FINDINGS:  Extremely limited evaluation secondary to patient positioning.    Enteric tube is present.    IMPRESSION:  On this limited study, an enteric tube is present.    --- End of Report ---          CHANCE BLACKBURN MD; Resident Radiologist  This document has been electronically signed.  JERRI FUNK DO; Attending Radiologist  This document has been electronically signed. Aug 25 2023 10:26AM    < end of copied text >          RECENT CULTURES:        RESPIRATORY CULTURES:          Studies  Chest X-RAY  CT SCAN Chest   Venous Dopplers: LE:   CT Abdomen  Others

## 2023-08-27 NOTE — PROGRESS NOTE ADULT - ASSESSMENT
74 y/o F DM on insulin, HTN, CKD, CHFpEF, breast CA, respiratory arrest and cardiac arrest (2018), CVA with residual weakness, aspiration PNA h/o trache, PEG, both removed presents with respiratory distress requiring intubation. Found to have elevated BNP, may be 2/2 to volume overload i/s/o CKD vs CHF.     Neuro ;  - Sedated : Baseline MS AOx3   -WAS ON PROPOFOL AND FENTANYL BEFORE: NOW OFF:    - Monitor her mental status:  requiring only 30% fio2:    -8/15: - now she has been off sedation for more then 24 hours but is still lethargic: her o2 requirement has not increased:   -for possible extubation if she wakes up   :: -dw PMD: pt is still lethargic  for MRI brain stem today   ": -had ct head: OK: awaiting MRI brain : still lethargic  : seemed same to me: family at bedside who says she is little  bit more awake: oxygen requirement is same:  at 30%:off vasopressors   -now neuro note reviewed:  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 reason for inability ot trigger vent and co2 retention initially ? candidate for trach    -now the co2 i s normal : not much improvement in her mental statis:  off sedation for days   /: still lethargic: on precedex;  cxr reviewed:  last time at [facility] she was found to have cavity in RUL : she was ruled out for tb : her AFB was positive but was MAC /; defer to MICU   "  still sedated:  no sob:  on 30% fio2:  on precedex:  and is on nicardipine as well as labetalol ; for renal biopsy today  : seems OK: more alert and awake:  renal biopsy is still pending:  la nena family   : doing OK: alert and awake:  but still intubated:  getting cpap : no renal biopsy done   : notable to be weaned:  probably trach next week if she is not extubated by early next week : restarted on bumex  : doing same on cpap trials at this time:  cont iv diuresis per renal ; on predniosne for enalissues:   CVA   - cont aspirin and statin   //-per neurology   : sedated  neuro followin/23:edation:  seems to be doing better:  plan for extubation if family refused for biopsy   : more awake and alert :   : stable  : sheis awake but is letahrgic  Cardiac   -CHFpEF : TTE 2023 with EF 59%, with severe LVH and diastolic dysfunction : pro-BNP > 76895, trop 78   -on bumex drip :  -cards following   8/15: : she is off bumex drip and is on midodrine   : remains off bumex  :: off bumex:  defer to MICU team   cont to remain off diuretics   : she seems same to me: in renal failure off diuretics   : per Micu   : seems stable  : back  on bumex drip    : cont bumex drip    Pulmonary   -Acute hypercapnic and hypoxemic respiratory failure   -DDx: aspiration vs volume overload  VBG with respiratory acidosis pCO2 111  - CXR with small right pleural effusion, no consolidations/opacities  -now she seems better:  fio2: 30$:  -? etiology of hypercarbia:  multifactorial : seems to have OHS and TYRONE superimposed by acute chf  ? and aspiration pneumonia:"   -pt is mostly bed bound:   -it is possible that this time:  she mght need bipap on dc : atleast at the night time:   -on 8/15:  still remains intubated  :still lethargic:  not extubated hypercarbic acidotic today on abg:  weaning trials :probably would need bipap following extubation  : :still lethargic: :awaiting mri brain : dw    : :MRI done has new right cerebellar infarct on mri : defer to MICU   -seen by bora now:  has brain stem infarct to per neuro note:  reason for being vent;    : awaiting renal bipsy today  : and then aggressive weaning  : now no renal biopsy :    : still sightly hypoxic but good sao2:  cont to wean and extubate if needed  still has AMS   /Renal   -Hyperkalemia with EKG changes  : 7.2, hemolyzed, given insulin and D50 + calcium gluc   -K is better today : cont to monitor  -normal today on 8/15   8/16:stable  :-k has been ok for l ast few days  : stable  : started on prednisone yesterday for suspected AIN by renal    : wbc is high : likely secondary to steroids  she has no fveR:  ruled out for tb last time   : for renal biopsy today   : RENAL  BIOPSY NOT DONE:  FAMILY IS THINKING about itl   : biopsy not done: urine output increased:   : doing  ok : no sob:  no biopsy for now:   : per primary pulm tea in MICU   Endocrine  T2DM : A1C 7.1 in 2023   -cont to monitor blood glucose  ID   - No fever/leukocytosis  - Hx latent TB which was treated, no concern for TB  : She was recently ruled out for tuberculosis at [facility]   - Started on empiric vancomycin and aztreonam (cefepime/zosyn allergy? developed rash req prednisone)  : now dced:   defer to MICU   - f/u MRSA   - follow up blood culture/urine   :blood cultures negative so far: legionella is negative:  remains off antibiotics  staph aureus on sputum   :: off antibiotics at this time: secondary to drug eruption  : remains off antibiotics   -: off diuretics   : off antibiotics   : off antibiotics   : off antibiotics   : off antibtiocs  : remains afebrile:     dw micu and  at bedside   overall prognosis seems guarded              72 y/o F DM on insulin, HTN, CKD, CHFpEF, breast CA, respiratory arrest and cardiac arrest (2018), CVA with residual weakness, aspiration PNA h/o trache, PEG, both removed presents with respiratory distress requiring intubation. Found to have elevated BNP, may be 2/2 to volume overload i/s/o CKD vs CHF.     Neuro ;  - Sedated : Baseline MS AOx3   -WAS ON PROPOFOL AND FENTANYL BEFORE: NOW OFF:    - Monitor her mental status:  requiring only 30% fio2:    -8/15: - now she has been off sedation for more then 24 hours but is still lethargic: her o2 requirement has not increased:   -for possible extubation if she wakes up   :: -dw PMD: pt is still lethargic  for MRI brain stem today   ": -had ct head: OK: awaiting MRI brain : still lethargic  : seemed same to me: family at bedside who says she is little  bit more awake: oxygen requirement is same:  at 30%:off vasopressors   -now neuro note reviewed:  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 reason for inability ot trigger vent and co2 retention initially ? candidate for trach    -now the co2 i s normal : not much improvement in her mental statis:  off sedation for days   /: still lethargic: on precedex;  cxr reviewed:  last time at [facility] she was found to have cavity in RUL : she was ruled out for tb : her AFB was positive but was MAC /; defer to MICU   "  still sedated:  no sob:  on 30% fio2:  on precedex:  and is on nicardipine as well as labetalol ; for renal biopsy today  : seems OK: more alert and awake:  renal biopsy is still pending:  la nena family   : doing OK: alert and awake:  but still intubated:  getting cpap : no renal biopsy done   : notable to be weaned:  probably trach next week if she is not extubated by early next week : restarted on bumex  : doing same on cpap trials at this time:  cont iv diuresis per renal ; on predniosne for renal issues:   : seems OK: no sob:  nocugh : no phlegm : on cpap trial: ? extubate L vs trach    CVA   - cont aspirin and statin   //-per neurology   : sedated  neuro followin/23:edation:  seems to be doing better:  plan for extubation if family refused for biopsy   : more awake and alert :   : stable  : sheis awake but is letahrgic  : sheis little bit more alert today  :   Cardiac   -CHFpEF : TTE 2023 with EF 59%, with severe LVH and diastolic dysfunction : pro-BNP > 37574, trop 78   -on bumex drip :  -cards following   8/15: : she is off bumex drip and is on midodrine   : remains off bumex  :: off bumex:  defer to MICU team   cont to remain off diuretics   : she seems same to me: in renal failure off diuretics   : per Micu   : seems stable  : back  on bumex drip    : cont bumex drip    : diuretics per renal   Pulmonary   -Acute hypercapnic and hypoxemic respiratory failure   -DDx: aspiration vs volume overload  VBG with respiratory acidosis pCO2 111  - CXR with small right pleural effusion, no consolidations/opacities  -now she seems better:  fio2: 30$:  -? etiology of hypercarbia:  multifactorial : seems to have OHS and TYRONE superimposed by acute chf  ? and aspiration pneumonia:"   -pt is mostly bed bound:   -it is possible that this time:  she mght need bipap on dc : atleast at the night time:   -on 8/15:  still remains intubated  :still lethargic:  not extubated hypercarbic acidotic today on abg:  weaning trials :probably would need bipap following extubation  : :still lethargic: :awaiting mri brain : dw    : :MRI done has new right cerebellar infarct on mri : defer to MICU   -seen by bora now:  has brain stem infarct to per neuro note:  reason for being vent;    : awaiting renal bipsy today  : and then aggressive weaning  : now no renal biopsy :    : still sightly hypoxic but good sao2:  cont to wean and extubate if needed  still has AMS   :? extubation vs trach   /Renal   -Hyperkalemia with EKG changes  : 7.2, hemolyzed, given insulin and D50 + calcium gluc   -K is better today : cont to monitor  -normal today on 8/15   8/16:stable  :-k has been ok for l ast few days  : stable  : started on prednisone yesterday for suspected AIN by renal    : wbc is high : likely secondary to steroids  she has no fveR:  ruled out for tb last time   : for renal biopsy today   : RENAL  BIOPSY NOT DONE:  FAMILY IS THINKING about itl   : biopsy not done: urine output increased:   : doing  ok : no sob:  no biopsy for now:   : per primary pulm tea in MICU   Endocrine  T2DM : A1C 7.1 in 2023   -cont to monitor blood glucose  ID   - No fever/leukocytosis  - Hx latent TB which was treated, no concern for TB  : She was recently ruled out for tuberculosis at [facility]   - Started on empiric vancomycin and aztreonam (cefepime/zosyn allergy? developed rash req prednisone)  : now dced:   defer to MICU   - f/u MRSA   - follow up blood culture/urine   :blood cultures negative so far: legionella is negative:  remains off antibiotics  staph aureus on sputum   :: off antibiotics at this time: secondary to drug eruption  : remains off antibiotics   -: off diuretics   : off antibiotics   : off antibiotics   : off antibiotics   : off antibtiocs  : remains afebrile:     dw micu and  at bedside   overall prognosis seems guarded

## 2023-08-27 NOTE — PROGRESS NOTE ADULT - PROBLEM SELECTOR PLAN 1
Multifactorial   Aspiration, acute on chronic diastolic CHF   completed IV abx   Ventilatory support  ?trach

## 2023-08-27 NOTE — PROGRESS NOTE ADULT - SUBJECTIVE AND OBJECTIVE BOX
HD yest 2.5 L removed   Remains intubated   on nicardipine  gtt       VITAL:  T(C): , Max: 37.2 (08-27-23 @ 12:00)  T(F): , Max: 98.9 (08-27-23 @ 12:00)  HR: 66 (08-27-23 @ 14:00)  BP: --  BP(mean): --  RR: 12 (08-27-23 @ 14:00)  SpO2: 100% (08-27-23 @ 14:00)  Wt(kg): --      PHYSICAL EXAM:  General: Intubated, sedated   HEENT: MMM  Lungs:CTA-b/l  Heart: RRR S1S2  Abdomen: Soft, NTND  Ext:  pedal edema b/l  :  willard  vascular Access - R internal jugular vasc cath     LABS:                        7.2    13.06 )-----------( 321      ( 27 Aug 2023 04:03 )             21.7     Na(137)/K(3.8)/Cl(100)/HCO3(24)/BUN(60)/Cr(2.28)Glu(233)/Ca(7.7)/Mg(--)/PO4(--)    08-27 @ 12:30  Na(137)/K(3.6)/Cl(100)/HCO3(24)/BUN(56)/Cr(2.19)Glu(209)/Ca(7.8)/Mg(2.00)/PO4(6.2)    08-27 @ 04:03  Na(136)/K(4.6)/Cl(99)/HCO3(19)/BUN(98)/Cr(3.33)Glu(177)/Ca(7.5)/Mg(2.20)/PO4(9.3)    08-26 @ 19:25  Na(136)/K(4.5)/Cl(100)/HCO3(18)/BUN(96)/Cr(3.19)Glu(168)/Ca(7.6)/Mg(2.30)/PO4(8.8)    08-26 @ 02:15  Na(134)/K(4.5)/Cl(99)/HCO3(19)/BUN(93)/Cr(3.08)Glu(155)/Ca(7.5)/Mg(--)/PO4(--)    08-25 @ 15:30  Na(136)/K(4.4)/Cl(99)/HCO3(22)/BUN(87)/Cr(2.97)Glu(144)/Ca(7.6)/Mg(2.30)/PO4(7.6)    08-25 @ 01:30  Na(135)/K(4.3)/Cl(100)/HCO3(22)/BUN(80)/Cr(2.82)Glu(144)/Ca(7.7)/Mg(2.30)/PO4(7.7)    08-24 @ 15:20    Urinalysis Basic - ( 27 Aug 2023 12:30 )    Color: x / Appearance: x / SG: x / pH: x  Gluc: 233 mg/dL / Ketone: x  / Bili: x / Urobili: x   Blood: x / Protein: x / Nitrite: x   Leuk Esterase: x / RBC: x / WBC x   Sq Epi: x / Non Sq Epi: x / Bacteria: x      A/P   73F w/ HTN, DM2, CVA, breast CA-bilateral mastectomy, recurrent aspiration pneumonia/respiratory failure, and CKD, 8/11/23 p/w acute hypercapnic respiratory failure; c/b RUSS    (1)Renal - CKD4     (2)RUSS - Nonoliguric, but remains overloaded. Creatinine appears to be plateauing at around 3mg/dL, corresponding with GFR ~15ml/min. Ischemic ATN, vs AIN. On Prednisone 40mg po qd given possibility of AIN.    (3)Hyperphosphatemia - higher     (4)Pulm- hypercapnic respiratory failure on admission - remains intubated    (5)CV - BP controlled on nicardipine gtt         RECOMMEND:  Restart bumex gtt at 2 mg/hour ( aim for net negative 2 L as able)   Will consider adding metolazone  No HD today   Prednisone 40mg po qd x 2 week course, then taper down  c/w  Renvela powder, 1600mg TID via OGT  Dose new meds for GFR ~10 ml/min    d/w'd MICU team           Monica Danielle MD  [facility]  Office: [phone]         HD yest 2.5 L removed   Remains intubated   on nicardipine  gtt       VITAL:  T(C): , Max: 37.2 (08-27-23 @ 12:00)  T(F): , Max: 98.9 (08-27-23 @ 12:00)  HR: 66 (08-27-23 @ 14:00)  BP: --  BP(mean): --  RR: 12 (08-27-23 @ 14:00)  SpO2: 100% (08-27-23 @ 14:00)  Wt(kg): --      PHYSICAL EXAM:  General: Intubated, sedated   HEENT: ET, OG+   Lungs:CTA-b/l  Heart: RRR S1S2  Abdomen: Soft, NTND  Ext:  pedal edema b/l  :  willard  vascular Access - R internal jugular vasc cath     LABS:                        7.2    13.06 )-----------( 321      ( 27 Aug 2023 04:03 )             21.7     Na(137)/K(3.8)/Cl(100)/HCO3(24)/BUN(60)/Cr(2.28)Glu(233)/Ca(7.7)/Mg(--)/PO4(--)    08-27 @ 12:30  Na(137)/K(3.6)/Cl(100)/HCO3(24)/BUN(56)/Cr(2.19)Glu(209)/Ca(7.8)/Mg(2.00)/PO4(6.2)    08-27 @ 04:03  Na(136)/K(4.6)/Cl(99)/HCO3(19)/BUN(98)/Cr(3.33)Glu(177)/Ca(7.5)/Mg(2.20)/PO4(9.3)    08-26 @ 19:25  Na(136)/K(4.5)/Cl(100)/HCO3(18)/BUN(96)/Cr(3.19)Glu(168)/Ca(7.6)/Mg(2.30)/PO4(8.8)    08-26 @ 02:15  Na(134)/K(4.5)/Cl(99)/HCO3(19)/BUN(93)/Cr(3.08)Glu(155)/Ca(7.5)/Mg(--)/PO4(--)    08-25 @ 15:30  Na(136)/K(4.4)/Cl(99)/HCO3(22)/BUN(87)/Cr(2.97)Glu(144)/Ca(7.6)/Mg(2.30)/PO4(7.6)    08-25 @ 01:30  Na(135)/K(4.3)/Cl(100)/HCO3(22)/BUN(80)/Cr(2.82)Glu(144)/Ca(7.7)/Mg(2.30)/PO4(7.7)    08-24 @ 15:20    Urinalysis Basic - ( 27 Aug 2023 12:30 )    Color: x / Appearance: x / SG: x / pH: x  Gluc: 233 mg/dL / Ketone: x  / Bili: x / Urobili: x   Blood: x / Protein: x / Nitrite: x   Leuk Esterase: x / RBC: x / WBC x   Sq Epi: x / Non Sq Epi: x / Bacteria: x      A/P   73F w/ HTN, DM2, CVA, breast CA-bilateral mastectomy, recurrent aspiration pneumonia/respiratory failure, and CKD, 8/11/23 p/w acute hypercapnic respiratory failure; c/b RUSS    (1)Renal - CKD4     (2)RUSS - Nonoliguric, but remains overloaded. Creatinine appears to be plateauing at around 3mg/dL, corresponding with GFR ~15ml/min. Ischemic ATN, vs AIN. On Prednisone 40mg po qd given possibility of AIN.    (3)Hyperphosphatemia - higher     (4)Pulm- hypercapnic respiratory failure on admission - remains intubated    (5)CV - BP controlled on nicardipine gtt         RECOMMEND:  Restart bumex gtt at 2 mg/hour ( aim for net negative 2 L as able)   Will consider adding metolazone  No HD today   Prednisone 40mg po qd x 2 week course, then taper down  c/w  Renvela powder, 1600mg TID via OGT  Dose new meds for GFR ~10 ml/min    d/w'd MICU team           Monica Danielle MD  [facility]  Office: [phone]

## 2023-08-27 NOTE — PROGRESS NOTE ADULT - ASSESSMENT
74 y/o F DM on insulin, HTN, CKD, CHFpEF, breast CA, respiratory arrest and cardiac arrest (2018), CVA with residual weakness, aspiration PNA h/o trache, PEG, both removed presents with respiratory distress requiring intubation. May be volume overload i/s/o CHF/CKD vs decrease respiratory drive (given oxygen at home). Improving mental status however worsening renal function and anuria, concerning for ATN vs AIN.     Neuro   #AMS  #Metabolic encephalopathy   #CVA   Baseline MS AOx3 with short term memory changes per family  - MRI brain 8/17 showed right cerebellar infarct (new) with old left PCA/occipital infarcts, likely embolic in nature - STEPHANIE with no TRINITY thrombus or intracardiac shunts  - Now with mild sedation with Precedex for ventilator synchrony  - Intermittent shaking noted, per family happens at baseline but more pronounced here; EEG without epileptiform activity, per neuro no need for LP  - Restarted aspirin 8/26    Cardiac   #Hypertension; SBP max 200s ? iso renal dysfunction/overlaod, now 130s-140s  - Labetalol 600 TID - will hold off on further increases to prevent bradycardia  - On cardene and bumex drips - will increase clonidine to 0.2 TID to help wean cardene    #Shock  - Resolved     #CHFpEF   - TTE 7/2023 with EF 59%, with severe LVH and diastolic dysfunction   - pro-BNP > 80142, trop 78   - Repeat TTE with EF 60% normal systolic and grade 1 diastolic dysfunction     Pulmonary   #Acute hypercapnic and hypoxemic respiratory failure   - DDx: aspiration vs volume overload vs sedating medication decreasing drive (was given nyquil)   - Previously did not tolerate CPAP trials  - Will reattempt CPAP trial today, in preparation for extubation  - If fails further SBT trials, may need trach    /Renal   #RUSS on CKD  - Ddx: obstructive (willard in, no hydro on POCUS) vs low flow state vs AIN i/s/o cephalosporin use and drug rash   - kidney biopsy to r/o AIN planned 8/22, however family felt procedure too risky and so deferred  - cont empiric prednisone 40mg started 8/18   - Continue to monitor, dose meds per eGFR. avoid nephrotoxic agents  - first HD session 8/19, will plan for additional fluid removal or HD sessions with nephro before extubation  - Strict IOs per nephro; Willard in place for UOP monitoring  - On Bumex drip  - Will follow up with nephro whether further HD planned for today    # Hyperphosphatemia  - Continue to monitor BMP q12  - Started on renvela powder TID per nephro recs    GI  - Had one episode of coffee ground emesis 8/24, hemoglobin stable  - GI consulted, no plan for scope  - Continue PPI IV BID, will transition to PO post extubation  - Restarted tube feeds    Endocrine  #T2DM   - ISS  - NPH DC'd while NPO, will restart if BG increase now that tube feeds are started    ID   Started on empiric vancomycin and aztreonam (cefepime/zosyn allergy? developed rash req prednisone)   Trialed cefepime --> cefazolin (sputum MSSA), developed drug eruption - discontinued   Blood and urine cultures 8/11 demonstrating no growth currently, no clinical signs of infection  - Monitor off antibiotics    Heme/Onc  ppx: heparin q8 hrs     Ethics  GOC - Per previous GOC with daughter and , reported that they have not talked about end of life care with the patient. For now, they wanted "everything to be done" including CPR, continuing with mech ventilation/intubation, pressors   72 y/o F DM on insulin, HTN, CKD, CHFpEF, breast CA, respiratory arrest and cardiac arrest (2018), CVA with residual weakness, aspiration PNA h/o trache, PEG, both removed presents with respiratory distress requiring intubation. May be volume overload i/s/o CHF/CKD vs decrease respiratory drive (given oxygen at home). Improving mental status however worsening renal function and anuria, concerning for ATN vs AIN.     Neuro   #AMS  #Metabolic encephalopathy   #CVA   Baseline MS AOx3 with short term memory changes per family  - MRI brain 8/17 showed right cerebellar infarct (new) with old left PCA/occipital infarcts, likely embolic in nature - STEPHANIE with no TRINITY thrombus or intracardiac shunts  - Now with mild sedation with Precedex for ventilator synchrony  - Intermittent shaking noted, per family happens at baseline but more pronounced here; EEG without epileptiform activity, per neuro no need for LP  - Restarted aspirin 8/26    Cardiac   #Hypertension; SBP max 200s ? iso renal dysfunction/overlaod, now 130s-140s  - Labetalol 600 TID - will hold off on further increases to prevent bradycardia  - Clonidine increased to 0.2 TID (8/26) to help wean nicardipine gtt  - wean down as tolerated    #Shock  - Resolved     #CHFpEF   - TTE 7/2023 with EF 59%, with severe LVH and diastolic dysfunction   - pro-BNP > 37681, trop 78   - Repeat TTE with EF 60% normal systolic and grade 1 diastolic dysfunction     Pulmonary   #Acute hypercapnic and hypoxemic respiratory failure   - DDx: aspiration vs volume overload vs sedating medication decreasing drive (was given nyquil)   - Previously did not tolerate CPAP trials  - Will continue CPAP trial today, in preparation for extubation  - If fails SBT trials, may need trach    /Renal   #RUSS on CKD  - Ddx: obstructive (willard in, no hydro on POCUS) vs low flow state vs AIN i/s/o cephalosporin use and drug rash   - kidney biopsy to r/o AIN planned 8/22, however family felt procedure too risky and so deferred  - cont empiric prednisone 40mg started 8/18   - Continue to monitor, dose meds per eGFR. avoid nephrotoxic agents  - first HD session 8/19, most recent session 8/26  - Strict IOs per nephro; Willard in place for UOP monitoring  - c/w prednisone 40 for 14 days per nephro for AIN, then taper (8/18-     # Hyperphosphatemia  - Continue to monitor BMP q12  - Started on renvela powder TID per nephro recs    GI  - Had one episode of coffee ground emesis 8/24, hemoglobin stable  - GI consulted, no plan for scope  - Continue PPI IV BID iso recent CGE, will transition to PO post extubation  - Restarted tube feeds    Endocrine  #T2DM   - ISS  - NPH DC'd while NPO, will restart if BG increase now that tube feeds are started    ID   Started on empiric vancomycin and aztreonam (cefepime/zosyn allergy? developed rash req prednisone)   Trialed cefepime --> cefazolin (sputum MSSA), developed drug eruption - discontinued   Blood and urine cultures 8/11 demonstrating no growth currently, no clinical signs of infection  - Monitor off antibiotics    Heme/Onc  ppx: heparin q8 hrs     Ethics  GOC - Per previous GOC with daughter and , reported that they have not talked about end of life care with the patient. For now, they wanted "everything to be done" including CPR, continuing with mech ventilation/intubation, pressors

## 2023-08-27 NOTE — PROGRESS NOTE ADULT - SUBJECTIVE AND OBJECTIVE BOX
Subjective: Patient seen and examined. No new events except as noted.   remains intubated in ICU   cpap trials       REVIEW OF SYSTEMS:  Unable to obtain     MEDICATIONS:  MEDICATIONS  (STANDING):  albuterol/ipratropium for Nebulization 3 milliLiter(s) Nebulizer every 8 hours  aspirin  chewable 81 milliGRAM(s) Oral daily  atorvastatin 40 milliGRAM(s) Oral at bedtime  chlorhexidine 0.12% Liquid 15 milliLiter(s) Oral Mucosa every 12 hours  chlorhexidine 2% Cloths 1 Application(s) Topical daily  cloNIDine 0.2 milliGRAM(s) Oral every 8 hours  dexMEDEtomidine Infusion 1 MICROgram(s)/kG/Hr (16.6 mL/Hr) IV Continuous <Continuous>  dextrose 5%. 1000 milliLiter(s) (50 mL/Hr) IV Continuous <Continuous>  dextrose 5%. 1000 milliLiter(s) (100 mL/Hr) IV Continuous <Continuous>  dextrose 50% Injectable 25 Gram(s) IV Push once  dextrose 50% Injectable 12.5 Gram(s) IV Push once  dextrose 50% Injectable 25 Gram(s) IV Push once  glucagon  Injectable 1 milliGRAM(s) IntraMuscular once  heparin   Injectable 5000 Unit(s) SubCutaneous every 8 hours  insulin lispro (ADMELOG) corrective regimen sliding scale   SubCutaneous every 6 hours  labetalol 600 milliGRAM(s) Oral three times a day  niCARdipine Infusion 2.5 mG/Hr (12.5 mL/Hr) IV Continuous <Continuous>  pantoprazole  Injectable 40 milliGRAM(s) IV Push two times a day  predniSONE   Tablet 40 milliGRAM(s) Oral daily  sevelamer carbonate Powder 1600 milliGRAM(s) Oral three times a day      PHYSICAL EXAM:  T(C): 36.3 (08-27-23 @ 04:00), Max: 36.4 (08-26-23 @ 12:00)  HR: 72 (08-27-23 @ 08:21) (61 - 81)  BP: --  RR: 15 (08-27-23 @ 08:00) (11 - 18)  SpO2: 100% (08-27-23 @ 08:21) (100% - 100%)  Wt(kg): --  I&O's Summary    26 Aug 2023 07:01  -  27 Aug 2023 07:00  --------------------------------------------------------  IN: 1579.1 mL / OUT: 3610 mL / NET: -2030.9 mL    27 Aug 2023 07:01  -  27 Aug 2023 09:24  --------------------------------------------------------  IN: 41 mL / OUT: 0 mL / NET: 41 mL            Appearance: NAD, intubated, arousable/resp[onsive  HEENT: dry oral mucosa, [facility] AMBROCIO   Lymphatic: No lymphadenopathy  Cardiovascular: Normal S1 S2, No JVD, No murmurs, No edema  Respiratory: Decreased BS, intubated on ventilator	  Psychiatry: A & O x 1  Gastrointestinal: Soft, Non-tender, + BS, + OGT	  Skin: No rashes, No ecchymoses, No cyanosis	  Extremities: No strength/ROM 2/2 sedation, + BL LE edema  Vascular: Peripheral pulses palpable 2+ bilaterally  +willard    Mode: CPAP with PS, FiO2: 30, PEEP: 5, PS: 10, MAP: 8, PIP: 15  Plateau pressure:   P/F ratio:       LABS:    CARDIAC MARKERS:    Blood Gas Arterial, Lactate: 0.5 mmol/LBlood Gas Profile - Arterial (08.27.23 @ 04:03)   pH, Arterial: 7.35  pCO2, Arterial: 42 mmHg  pO2, Arterial: 99 mmHg  HCO3, Arterial: 23 mmol/L  Base Excess, Arterial: -2.3 mmol/L  Oxygen Saturation, Arterial: 98.6 %  Total CO2, Arterial: 24 mmol/L  FIO2, Arterial: 30                            7.2    13.06 )-----------( 321      ( 27 Aug 2023 04:03 )             21.7     08-27    137  |  100  |  56<H>  ----------------------------<  209<H>  3.6   |  24  |  2.19<H>    Ca    7.8<L>      27 Aug 2023 04:03  Phos  6.2     08-27  Mg     2.00     08-27    TPro  5.7<L>  /  Alb  2.5<L>  /  TBili  0.2  /  DBili  x   /  AST  16  /  ALT  21  /  AlkPhos  142<H>  08-27    proBNP:   Lipid Profile:   HgA1c:   TSH:             TELEMETRY: 	SR    ECG:  	  RADIOLOGY: < from: Xray Chest 1 View- PORTABLE-Urgent (Xray Chest 1 View- PORTABLE-Urgent .) (08.24.23 @ 18:23) >    ACC: [id number] EXAM:  XR CHEST PORTABLE URGENT 1V   ORDERED BY: SARAH DEAL     PROCEDURE DATE:  08/24/2023          INTERPRETATION:  CLINICAL INFORMATION: New onset coffee-ground emesis.    TECHNIQUE: One view of the chest.    COMPARISON: Radiograph chest 8/19/2023.    FINDINGS:  Extremely limited evaluation secondary to patient positioning.    Enteric tube is present.    IMPRESSION:  On this limited study, an enteric tube is present.    --- End of Report ---    < end of copied text >    DIAGNOSTIC TESTING:  [ ] Echocardiogram:  [ ]  Catheterization:  [ ] Stress Test:    OTHER:

## 2023-08-27 NOTE — PROGRESS NOTE ADULT - ATTENDING COMMENTS
Patient is a 72 yo F w/ T2DM, CKD, HFpEF, h/o CVA and trach and PEG (now removed) admitted to MICU for acute hypoxemic and hypercapnic respiratory failure requiring intubation in setting of RUSS and acute pulmonary edema, possible ATN vs AIN    # Acute hypoxemic and hypercapnic respiratory failure  # RUSS/ATN/AIN  # Acute pulmonary edema  # HTN  - c/w mechanical ventilatory support, wean as tolerated. Tolerating PSV 8/5 today. Long discussion with  at bedside about possibility of extubation today or tomorrow but need to make decision regarding reintubation/trach if patient fails extubation. Will readdress with daughter.  - Wean precedex as tolerated  - c/w labetalol and clonidine. Wean Cardizem gtt as toelrated  - HD as per renal  - c/w prednisone for possible AIN, plan for 14 days then wean  - Adjust insulin    #Hematemesis - No further coffee ground in NGT  #R cerbella CVA  - Resume feeds as per GI  - Resume ASA  - Trend CBC    Mitchell Mcnamara MD  Pulmonary & Critical Care

## 2023-08-27 NOTE — PROGRESS NOTE ADULT - SUBJECTIVE AND OBJECTIVE BOX
Patient is a 73y old  Female who presents with a chief complaint of Respiratory distress (27 Aug 2023 07:06)      SUBJECTIVE / OVERNIGHT EVENTS: cpap trials , may need trache    MEDICATIONS  (STANDING):  albuterol/ipratropium for Nebulization 3 milliLiter(s) Nebulizer every 8 hours  aspirin  chewable 81 milliGRAM(s) Oral daily  atorvastatin 40 milliGRAM(s) Oral at bedtime  chlorhexidine 0.12% Liquid 15 milliLiter(s) Oral Mucosa every 12 hours  chlorhexidine 2% Cloths 1 Application(s) Topical daily  cloNIDine 0.2 milliGRAM(s) Oral every 8 hours  dexMEDEtomidine Infusion 1 MICROgram(s)/kG/Hr (16.6 mL/Hr) IV Continuous <Continuous>  dextrose 5%. 1000 milliLiter(s) (50 mL/Hr) IV Continuous <Continuous>  dextrose 5%. 1000 milliLiter(s) (100 mL/Hr) IV Continuous <Continuous>  dextrose 50% Injectable 25 Gram(s) IV Push once  dextrose 50% Injectable 12.5 Gram(s) IV Push once  dextrose 50% Injectable 25 Gram(s) IV Push once  glucagon  Injectable 1 milliGRAM(s) IntraMuscular once  heparin   Injectable 5000 Unit(s) SubCutaneous every 8 hours  insulin lispro (ADMELOG) corrective regimen sliding scale   SubCutaneous every 6 hours  labetalol 600 milliGRAM(s) Oral three times a day  niCARdipine Infusion 2.5 mG/Hr (12.5 mL/Hr) IV Continuous <Continuous>  pantoprazole  Injectable 40 milliGRAM(s) IV Push two times a day  predniSONE   Tablet 40 milliGRAM(s) Oral daily  sevelamer carbonate Powder 1600 milliGRAM(s) Oral three times a day    MEDICATIONS  (PRN):  acetaminophen   Oral Liquid .. 650 milliGRAM(s) Oral every 6 hours PRN Temp greater or equal to 38C (100.4F), Mild Pain (1 - 3)  dextrose Oral Gel 15 Gram(s) Oral once PRN Blood Glucose LESS THAN 70 milliGRAM(s)/deciliter      Vital Signs Last 24 Hrs  T(F): 97.4 (08-27-23 @ 04:00), Max: 97.7 (08-26-23 @ 08:00)  HR: 70 (08-27-23 @ 07:00) (61 - 81)  BP: --  RR: 14 (08-27-23 @ 07:00) (11 - 18)  SpO2: 100% (08-27-23 @ 07:00) (100% - 100%)  Telemetry:   CAPILLARY BLOOD GLUCOSE      POCT Blood Glucose.: 228 mg/dL (27 Aug 2023 06:32)  POCT Blood Glucose.: 161 mg/dL (27 Aug 2023 00:32)  POCT Blood Glucose.: 167 mg/dL (26 Aug 2023 18:05)  POCT Blood Glucose.: 165 mg/dL (26 Aug 2023 11:57)    I&O's Summary    26 Aug 2023 07:01  -  27 Aug 2023 07:00  --------------------------------------------------------  IN: 1579.1 mL / OUT: 3610 mL / NET: -2030.9 mL        PHYSICAL EXAM:  GENERAL: NAD, well-developed  HEAD:  Atraumatic, Normocephalic  EYES: EOMI, PERRLA, conjunctiva and sclera clear  NECK: Supple, No JVD  CHEST/LUNG: Clear to auscultation bilaterally; No wheeze  HEART: Regular rate and rhythm; No murmurs, rubs, or gallops  ABDOMEN: Soft, Nontender, Nondistended; Bowel sounds present  EXTREMITIES:  2+ Peripheral Pulses, No clubbing, cyanosis, or edema  PSYCH: AAOx3  NEUROLOGY: non-focal  SKIN: No rashes or lesions    LABS:                        7.2    13.06 )-----------( 321      ( 27 Aug 2023 04:03 )             21.7     08-27    137  |  100  |  56<H>  ----------------------------<  209<H>  3.6   |  24  |  2.19<H>    Ca    7.8<L>      27 Aug 2023 04:03  Phos  6.2     08-27  Mg     2.00     08-27    TPro  5.7<L>  /  Alb  2.5<L>  /  TBili  0.2  /  DBili  x   /  AST  16  /  ALT  21  /  AlkPhos  142<H>  08-27    PT/INR - ( 27 Aug 2023 04:03 )   PT: 13.9 sec;   INR: 1.25 ratio         PTT - ( 27 Aug 2023 04:03 )  PTT:48.7 sec      Urinalysis Basic - ( 27 Aug 2023 04:03 )    Color: x / Appearance: x / SG: x / pH: x  Gluc: 209 mg/dL / Ketone: x  / Bili: x / Urobili: x   Blood: x / Protein: x / Nitrite: x   Leuk Esterase: x / RBC: x / WBC x   Sq Epi: x / Non Sq Epi: x / Bacteria: x        RADIOLOGY & ADDITIONAL TESTS:    Imaging Personally Reviewed:    Consultant(s) Notes Reviewed:      Care Discussed with Consultants/Other Providers:

## 2023-08-27 NOTE — PROGRESS NOTE ADULT - SUBJECTIVE AND OBJECTIVE BOX
INTERVAL HPI/OVERNIGHT EVENTS:    SUBJECTIVE: Patient seen and examined at bedside.       VITAL SIGNS:  ICU Vital Signs Last 24 Hrs  T(C): 36.3 (27 Aug 2023 04:00), Max: 36.5 (26 Aug 2023 08:00)  T(F): 97.4 (27 Aug 2023 04:00), Max: 97.7 (26 Aug 2023 08:00)  HR: 70 (27 Aug 2023 06:00) (60 - 81)  BP: --  BP(mean): --  ABP: 135/29 (27 Aug 2023 06:00) (107/35 - 164/41)  ABP(mean): 64 (27 Aug 2023 06:00) (60 - 84)  RR: 17 (27 Aug 2023 06:00) (11 - 18)  SpO2: 100% (27 Aug 2023 06:00) (100% - 100%)    O2 Parameters below as of 26 Aug 2023 20:40  Patient On (Oxygen Delivery Method): ventilator    O2 Concentration (%): 30      Mode: CPAP with PS, FiO2: 30, PEEP: 5, PS: 10, MAP: 8, PIP: 15  Plateau pressure:   P/F ratio:     08-26 @ 07:01  -  08-27 @ 07:00  --------------------------------------------------------  IN: 1205.2 mL / OUT: 3510 mL / NET: -2304.8 mL      CAPILLARY BLOOD GLUCOSE      POCT Blood Glucose.: 228 mg/dL (27 Aug 2023 06:32)      Physical exam:  GENERAL: NAD, lying in bed   HEAD:  Atraumatic, normocephalic  EYES: EOMI, PERRLA, conjunctiva and sclera clear  NECK: Supple, trachea midline, no JVD  HEART: Regular rate and rhythm, no murmurs, rubs, or gallops  LUNGS: Unlabored respirations on mechanical vent.  Clear to auscultation bilaterally, no crackles, wheezing, or rhonchi  ABDOMEN: Soft, nontender, nondistended, +BS  EXTREMITIES: 2+ peripheral pulses bilaterally, cap refill<2 secs. No clubbing, cyanosis. +Pitting edema   NERVOUS SYSTEM:  A&Ox3, following commands, moving all extremities, no focal deficits   SKIN: No rashes or lesions      MEDICATIONS:  MEDICATIONS  (STANDING):  albuterol/ipratropium for Nebulization 3 milliLiter(s) Nebulizer every 8 hours  aspirin  chewable 81 milliGRAM(s) Oral daily  atorvastatin 40 milliGRAM(s) Oral at bedtime  buMETAnide Infusion 2 mG/Hr (10 mL/Hr) IV Continuous <Continuous>  chlorhexidine 0.12% Liquid 15 milliLiter(s) Oral Mucosa every 12 hours  chlorhexidine 2% Cloths 1 Application(s) Topical daily  cloNIDine 0.2 milliGRAM(s) Oral every 8 hours  dexMEDEtomidine Infusion 1 MICROgram(s)/kG/Hr (16.6 mL/Hr) IV Continuous <Continuous>  dextrose 5%. 1000 milliLiter(s) (50 mL/Hr) IV Continuous <Continuous>  dextrose 5%. 1000 milliLiter(s) (100 mL/Hr) IV Continuous <Continuous>  dextrose 50% Injectable 25 Gram(s) IV Push once  dextrose 50% Injectable 12.5 Gram(s) IV Push once  dextrose 50% Injectable 25 Gram(s) IV Push once  glucagon  Injectable 1 milliGRAM(s) IntraMuscular once  heparin   Injectable 5000 Unit(s) SubCutaneous every 8 hours  insulin lispro (ADMELOG) corrective regimen sliding scale   SubCutaneous every 6 hours  labetalol 600 milliGRAM(s) Oral three times a day  niCARdipine Infusion 2.5 mG/Hr (12.5 mL/Hr) IV Continuous <Continuous>  pantoprazole  Injectable 40 milliGRAM(s) IV Push two times a day  predniSONE   Tablet 40 milliGRAM(s) Oral daily  sevelamer carbonate Powder 1600 milliGRAM(s) Oral three times a day    MEDICATIONS  (PRN):  acetaminophen   Oral Liquid .. 650 milliGRAM(s) Oral every 6 hours PRN Temp greater or equal to 38C (100.4F), Mild Pain (1 - 3)  dextrose Oral Gel 15 Gram(s) Oral once PRN Blood Glucose LESS THAN 70 milliGRAM(s)/deciliter      ALLERGIES:  Allergies    isoniazid (Rash)  nafcillin (Unknown)  hydrALAZINE (Rash)  vitamin E (Short breath; Urticaria; Hives)  doxycycline (Rash)  cefepime (Rash)  NIFEdipine (Urticaria; Hives)    Intolerances        LABS:                        7.2    13.06 )-----------( 321      ( 27 Aug 2023 04:03 )             21.7     08-27    137  |  100  |  56<H>  ----------------------------<  209<H>  3.6   |  24  |  2.19<H>    Ca    7.8<L>      27 Aug 2023 04:03  Phos  6.2     08-27  Mg     2.00     08-27    TPro  5.7<L>  /  Alb  2.5<L>  /  TBili  0.2  /  DBili  x   /  AST  16  /  ALT  21  /  AlkPhos  142<H>  08-27    PT/INR - ( 27 Aug 2023 04:03 )   PT: 13.9 sec;   INR: 1.25 ratio         PTT - ( 27 Aug 2023 04:03 )  PTT:48.7 sec  Urinalysis Basic - ( 27 Aug 2023 04:03 )    Color: x / Appearance: x / SG: x / pH: x  Gluc: 209 mg/dL / Ketone: x  / Bili: x / Urobili: x   Blood: x / Protein: x / Nitrite: x   Leuk Esterase: x / RBC: x / WBC x   Sq Epi: x / Non Sq Epi: x / Bacteria: x        RADIOLOGY & ADDITIONAL TESTS: Reviewed. INTERVAL HPI/OVERNIGHT EVENTS:    SUBJECTIVE: Patient seen and examined at bedside. Tolerated CPAP 10/5 overnight. Had HD yesterday, with net negative -2.5L. Bumex gtt was d/c'd ON. Continues to make urine 35-40cc/hr.      VITAL SIGNS:  ICU Vital Signs Last 24 Hrs  T(C): 36.3 (27 Aug 2023 04:00), Max: 36.5 (26 Aug 2023 08:00)  T(F): 97.4 (27 Aug 2023 04:00), Max: 97.7 (26 Aug 2023 08:00)  HR: 70 (27 Aug 2023 06:00) (60 - 81)  BP: --  BP(mean): --  ABP: 135/29 (27 Aug 2023 06:00) (107/35 - 164/41)  ABP(mean): 64 (27 Aug 2023 06:00) (60 - 84)  RR: 17 (27 Aug 2023 06:00) (11 - 18)  SpO2: 100% (27 Aug 2023 06:00) (100% - 100%)    O2 Parameters below as of 26 Aug 2023 20:40  Patient On (Oxygen Delivery Method): ventilator    O2 Concentration (%): 30      Mode: CPAP with PS, FiO2: 30, PEEP: 5, PS: 10, MAP: 8, PIP: 15  Plateau pressure:   P/F ratio:     08-26 @ 07:01  -  08-27 @ 07:00  --------------------------------------------------------  IN: 1205.2 mL / OUT: 3510 mL / NET: -2304.8 mL      CAPILLARY BLOOD GLUCOSE      POCT Blood Glucose.: 228 mg/dL (27 Aug 2023 06:32)      Physical exam:  GENERAL: NAD, lying in bed, intubated  HEAD:  Atraumatic, normocephalic  EYES: EOMI, PERRLA, conjunctiva and sclera clear  NECK: Supple, trachea midline, no JVD  HEART: Regular rate and rhythm, no murmurs, rubs, or gallops  LUNGS: Unlabored respirations on mechanical vent.  Clear to auscultation bilaterally, no crackles, wheezing, or rhonchi  ABDOMEN: Soft, nontender, nondistended, +BS  EXTREMITIES: 2+ peripheral pulses bilaterally, cap refill<2 secs. No clubbing, cyanosis. +Pitting edema   NERVOUS SYSTEM:  A&Ox3, following commands intermittently, moving all extremities, no focal deficits   SKIN: No rashes or lesions      MEDICATIONS:  MEDICATIONS  (STANDING):  albuterol/ipratropium for Nebulization 3 milliLiter(s) Nebulizer every 8 hours  aspirin  chewable 81 milliGRAM(s) Oral daily  atorvastatin 40 milliGRAM(s) Oral at bedtime  buMETAnide Infusion 2 mG/Hr (10 mL/Hr) IV Continuous <Continuous>  chlorhexidine 0.12% Liquid 15 milliLiter(s) Oral Mucosa every 12 hours  chlorhexidine 2% Cloths 1 Application(s) Topical daily  cloNIDine 0.2 milliGRAM(s) Oral every 8 hours  dexMEDEtomidine Infusion 1 MICROgram(s)/kG/Hr (16.6 mL/Hr) IV Continuous <Continuous>  dextrose 5%. 1000 milliLiter(s) (50 mL/Hr) IV Continuous <Continuous>  dextrose 5%. 1000 milliLiter(s) (100 mL/Hr) IV Continuous <Continuous>  dextrose 50% Injectable 25 Gram(s) IV Push once  dextrose 50% Injectable 12.5 Gram(s) IV Push once  dextrose 50% Injectable 25 Gram(s) IV Push once  glucagon  Injectable 1 milliGRAM(s) IntraMuscular once  heparin   Injectable 5000 Unit(s) SubCutaneous every 8 hours  insulin lispro (ADMELOG) corrective regimen sliding scale   SubCutaneous every 6 hours  labetalol 600 milliGRAM(s) Oral three times a day  niCARdipine Infusion 2.5 mG/Hr (12.5 mL/Hr) IV Continuous <Continuous>  pantoprazole  Injectable 40 milliGRAM(s) IV Push two times a day  predniSONE   Tablet 40 milliGRAM(s) Oral daily  sevelamer carbonate Powder 1600 milliGRAM(s) Oral three times a day    MEDICATIONS  (PRN):  acetaminophen   Oral Liquid .. 650 milliGRAM(s) Oral every 6 hours PRN Temp greater or equal to 38C (100.4F), Mild Pain (1 - 3)  dextrose Oral Gel 15 Gram(s) Oral once PRN Blood Glucose LESS THAN 70 milliGRAM(s)/deciliter      ALLERGIES:  Allergies    isoniazid (Rash)  nafcillin (Unknown)  hydrALAZINE (Rash)  vitamin E (Short breath; Urticaria; Hives)  doxycycline (Rash)  cefepime (Rash)  NIFEdipine (Urticaria; Hives)    Intolerances        LABS:                        7.2    13.06 )-----------( 321      ( 27 Aug 2023 04:03 )             21.7     08-27    137  |  100  |  56<H>  ----------------------------<  209<H>  3.6   |  24  |  2.19<H>    Ca    7.8<L>      27 Aug 2023 04:03  Phos  6.2     08-27  Mg     2.00     08-27    TPro  5.7<L>  /  Alb  2.5<L>  /  TBili  0.2  /  DBili  x   /  AST  16  /  ALT  21  /  AlkPhos  142<H>  08-27    PT/INR - ( 27 Aug 2023 04:03 )   PT: 13.9 sec;   INR: 1.25 ratio         PTT - ( 27 Aug 2023 04:03 )  PTT:48.7 sec  Urinalysis Basic - ( 27 Aug 2023 04:03 )    Color: x / Appearance: x / SG: x / pH: x  Gluc: 209 mg/dL / Ketone: x  / Bili: x / Urobili: x   Blood: x / Protein: x / Nitrite: x   Leuk Esterase: x / RBC: x / WBC x   Sq Epi: x / Non Sq Epi: x / Bacteria: x        RADIOLOGY & ADDITIONAL TESTS: Reviewed.

## 2023-08-27 NOTE — PROGRESS NOTE ADULT - ASSESSMENT
72 y/o F well known to me [facility]. she was admitted at [facility] 7/12-7/22 w aspiration PNA, was treated w CEFEPIME, developed an allergic rash,  dCHF, + MAC on AFB culture, had been progressively getting more and more lethargic and dyspneic at home since DC.   In  am of 8/11/23  ptn presented with respiratory distress w hypoxia and hypercarbia requiring intubation 2/2 volume overload +/- Asp PNA      Neuro   responds appropriately while intubated  Baseline MS AOx3, aphasic   - h/o CVA , on aspirin and statin . 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 probably embolic in nature, did not tolerate full AC in the past, STEPHANIE is neg , no shunts observed      Cardiac   Ptn well known to Dr. Dunn, consult reviewed   CHFpEF   TTE 7/2023 with EF 59%, with severe LVH and diastolic dysfunction   pro-BNP > 89421, trop 78   on cardine drip for bp control and labetalol po tid 600 mg, clonidine raised to 0.2 mg tid     Pulmonary   Acute hypercapnic and hypoxemic respiratory failure   well known to Dr. Mcnulty, consult reviewed   DDx: aspiration vs volume overload   VBG with respiratory acidosis pCO2 111  CT chest: mod ( worsening) R pl effusion, h/o RUL cavity, +MAC  - cefepime DCed on 8/13, started on Moxifloxacin on 8/13, since 8/14 off ABx  - weaned weaned off the vent w cpap    Renal   Hyperkalemia with EKG changes  , initially treated w LOKELMA,  now resolved   Ptn well know to Dr. Colon, consult reviewed      worsening renal function, oliguric, fluid overloaded, on Bumex drip, worsening azotemia, may need HD, renal following. BP elevated, on labetalol cardene, clonidine, on empiric steroids for AIN ,   started HD 8/19, and 8/21,   HD as per renal  on bumex  drip   renal biopsy has been deferred , ASA  resumed, AC on hold    GI  NPO for now, on tube feeds  coffee ground emesis x 1 8/24, now on IV PPI,     Endocrine  T2DM   A1C 7.1 in 7/2023   - BG goal 140-200     ID   No fever/leukocytosis, recheck temp   Hx latent TB which was treated, no concern for TB   - grew MAC on AFB culture from most recent admission. would call ID consult  Started on empiric vancomycin and aztreonam,  changed to cefepime 8/12, cefepime dced on 8/13(cefepime/zosyn allergy.  developed rash when on cefepime on previous admission ) . started on AVALOX on 8/13, off ABx on 8/14. ptn has an allergic rash, prob 2/2 cefepime  - f/u MRSA   - all cxs NTD    Heme/Onc  ppx: heparin q8 hrs     Ethics  GOC - Discussed GOC with daughter and , they have opted in the past for full code. and she remains full code at present

## 2023-08-28 NOTE — PROGRESS NOTE ADULT - SUBJECTIVE AND OBJECTIVE BOX
Subjective: Patient seen and examined. No new events except as noted.   remains in ICU   intubated   on  Bumex and Nicardipine gtts  arousable     REVIEW OF SYSTEMS:  Unable to obtain     MEDICATIONS:  MEDICATIONS  (STANDING):  albuterol/ipratropium for Nebulization 3 milliLiter(s) Nebulizer every 8 hours  aspirin  chewable 81 milliGRAM(s) Oral daily  atorvastatin 40 milliGRAM(s) Oral at bedtime  buMETAnide Infusion 4 mG/Hr (20 mL/Hr) IV Continuous <Continuous>  chlorhexidine 0.12% Liquid 15 milliLiter(s) Oral Mucosa every 12 hours  chlorhexidine 2% Cloths 1 Application(s) Topical daily  cloNIDine 0.2 milliGRAM(s) Oral every 8 hours  dexMEDEtomidine Infusion 1 MICROgram(s)/kG/Hr (16.6 mL/Hr) IV Continuous <Continuous>  dextrose 5%. 1000 milliLiter(s) (100 mL/Hr) IV Continuous <Continuous>  dextrose 5%. 1000 milliLiter(s) (50 mL/Hr) IV Continuous <Continuous>  dextrose 50% Injectable 25 Gram(s) IV Push once  dextrose 50% Injectable 12.5 Gram(s) IV Push once  dextrose 50% Injectable 25 Gram(s) IV Push once  glucagon  Injectable 1 milliGRAM(s) IntraMuscular once  heparin   Injectable 5000 Unit(s) SubCutaneous every 8 hours  insulin lispro (ADMELOG) corrective regimen sliding scale   SubCutaneous every 6 hours  labetalol 600 milliGRAM(s) Oral three times a day  niCARdipine Infusion 2.5 mG/Hr (12.5 mL/Hr) IV Continuous <Continuous>  pantoprazole  Injectable 40 milliGRAM(s) IV Push two times a day  predniSONE   Tablet 40 milliGRAM(s) Oral daily  sevelamer carbonate Powder 1600 milliGRAM(s) Oral three times a day      PHYSICAL EXAM:  T(C): 37.3 (08-28-23 @ 04:00), Max: 37.4 (08-28-23 @ 00:00)  HR: 66 (08-28-23 @ 10:00) (55 - 99)  BP: --  RR: 16 (08-28-23 @ 10:00) (11 - 26)  SpO2: 96% (08-28-23 @ 10:00) (93% - 100%)  Wt(kg): --  I&O's Summary    27 Aug 2023 07:01  -  28 Aug 2023 07:00  --------------------------------------------------------  IN: 870.7 mL / OUT: 450 mL / NET: 420.7 mL          Appearance: NAD, intubated, arousable/resp[onsive  HEENT: dry oral mucosa, [facility] AMBROCIO   Lymphatic: No lymphadenopathy  Cardiovascular: Normal S1 S2, No JVD, No murmurs, No edema  Respiratory: Decreased BS, intubated on ventilator	  Psychiatry: A & O x 1  Gastrointestinal: Soft, Non-tender, + BS, + OGT	  Skin: No rashes, No ecchymoses, No cyanosis	  Extremities: No strength/ROM 2/2 sedation, + BL LE edema  Vascular: Peripheral pulses palpable 2+ bilaterally  +willard    Mode: CPAP with PS, FiO2: 30, PEEP: 5, PS: 10, MAP: 8, PIP: 15  Plateau pressure:   P/F ratio:         LABS:    CARDIAC MARKERS:    Blood Gas Profile - Arterial (08.28.23 @ 00:30)   pH, Arterial: 7.28  pCO2, Arterial: 51 mmHg  pO2, Arterial: 81 mmHg  HCO3, Arterial: 24 mmol/L  Base Excess, Arterial: -2.7 mmol/L  Oxygen Saturation, Arterial: 97.8 %  Total CO2, Arterial: 26 mmol/L  FIO2, Arterial: 30  Blood Gas Source Arterial: ArterialBlood Gas Arterial, Lactate: 0.5 mmol/L (08.28.23 @ 00:30)                             6.9    14.41 )-----------( 280      ( 28 Aug 2023 05:25 )             21.2     08-28    137  |  99  |  64<H>  ----------------------------<  227<H>  4.1   |  22  |  2.55<H>    Ca    8.0<L>      28 Aug 2023 00:30  Phos  6.8     08-28  Mg     1.90     08-28    TPro  5.5<L>  /  Alb  2.5<L>  /  TBili  0.2  /  DBili  x   /  AST  10  /  ALT  17  /  AlkPhos  121<H>  08-28    proBNP:   Lipid Profile:   HgA1c:   TSH:             TELEMETRY: 	 SR   ECG:  	  RADIOLOGY:   DIAGNOSTIC TESTING:  [ ] Echocardiogram:  [ ]  Catheterization:  [ ] Stress Test:    OTHER:

## 2023-08-28 NOTE — PROGRESS NOTE ADULT - SUBJECTIVE AND OBJECTIVE BOX
Remains intubated/on vent; sedated on Precedex  On Bumex and Nicardipine gtts  Tolerate 2.5L UF with HD Saturday 8/26    VITAL:  T(C): , Max: 37.4 (08-28-23 @ 00:00)  T(F): , Max: 99.3 (08-28-23 @ 00:00)  HR: 72 (08-28-23 @ 08:00)  BP: 144/31  RR: 21 (08-28-23 @ 08:00)  SpO2: 95% (08-28-23 @ 08:00)  urine output 450cc/24h      PHYSICAL EXAM:  Constitutional: intubated/mildly sedated but following simple commands  HEENT: (+)ETT/OGT  Neck: Supple, No JVD  Respiratory: coarse BS b/l  Cardiovascular: RRR s1s2, no m/r/g  Gastrointestinal: BS+, soft, NT/ND  : (+)willard  Extremities: No peripheral edema b/l  Back: no CVAT b/l  Skin: No rashes, no nevi  Access: [facility]    LABS:                        6.9    14.41 )-----------( 280      ( 28 Aug 2023 05:25 )             21.2     Na(137)/K(4.1)/Cl(99)/HCO3(22)/BUN(64)/Cr(2.55)Glu(227)/Ca(8.0)/Mg(1.90)/PO4(6.8)    08-28 @ 00:30  Na(137)/K(3.8)/Cl(100)/HCO3(24)/BUN(60)/Cr(2.28)Glu(233)/Ca(7.7)/Mg(--)/PO4(--)    08-27 @ 12:30  Na(137)/K(3.6)/Cl(100)/HCO3(24)/BUN(56)/Cr(2.19)Glu(209)/Ca(7.8)/Mg(2.00)/PO4(6.2)    08-27 @ 04:03  Na(136)/K(4.6)/Cl(99)/HCO3(19)/BUN(98)/Cr(3.33)Glu(177)/Ca(7.5)/Mg(2.20)/PO4(9.3)    08-26 @ 19:25  Na(136)/K(4.5)/Cl(100)/HCO3(18)/BUN(96)/Cr(3.19)Glu(168)/Ca(7.6)/Mg(2.30)/PO4(8.8)    08-26 @ 02:15  Na(134)/K(4.5)/Cl(99)/HCO3(19)/BUN(93)/Cr(3.08)Glu(155)/Ca(7.5)/Mg(--)/PO4(--)    08-25 @ 15:30    7.28/51/81      IMPRESSION: 73F w/ HTN, DM2, CVA, breast CA-bilateral mastectomy, recurrent aspiration pneumonia/respiratory failure, and CKD, 8/11/23 p/w acute hypercapnic respiratory failure; c/b RUSS    (1)CKD - stage 4     (2)RUSS - borderline oliguric AIN vs ATN - last dialyzed Saturday 8/26/23 - Further HD warranted here in effort to facilitate extubation    (3)A/I - ?AIN - on empiric Prednisone 40mg qd - we can plan for 2 week course of 40mg; can start tapering as of 9/1/23.    (4)Hyperphosphatemia - renally mediated - PO4 high but acceptable for now, on Renvela powder    (5)Pulm- hypercapnic respiratory failure on admission - remains intubated    (6)CV - hypertension - reliant of late on Nicardipine gtt, high-dose Labetalol, PO Clonidine, and Bumex      RECOMMEND:  (1)HD again today - 3L UF as able  (2)Can d/c Bumex gtt at this point  (3)Prednisone 40mg po qd through 9/1/23, then start tapering  (4)Renvela as ordered  (5)Dose new meds for GFR ~15ml/min                Jimmy Colon MD  [facility]  Office/on call physician: [phone]  Cell (7a-7p): [phone]       Remains intubated/on vent; sedated on Precedex  On Bumex gtt  Tolerate 2.5L UF with HD Saturday 8/26     at bedside    VITAL:  T(C): , Max: 37.4 (08-28-23 @ 00:00)  T(F): , Max: 99.3 (08-28-23 @ 00:00)  HR: 72 (08-28-23 @ 08:00)  BP: 144/31  RR: 21 (08-28-23 @ 08:00)  SpO2: 95% (08-28-23 @ 08:00)  urine output 450cc/24h      PHYSICAL EXAM:  Constitutional: intubated/mildly sedated but following simple commands  HEENT: (+)ETT/OGT  Neck: Supple, No JVD  Respiratory: coarse BS b/l  Cardiovascular: RRR s1s2, no m/r/g  Gastrointestinal: BS+, soft, NT/ND  : (+)willard  Extremities: No peripheral edema b/l  Back: no CVAT b/l  Skin: No rashes, no nevi  Access: [facility]    LABS:                        6.9    14.41 )-----------( 280      ( 28 Aug 2023 05:25 )             21.2     Na(137)/K(4.1)/Cl(99)/HCO3(22)/BUN(64)/Cr(2.55)Glu(227)/Ca(8.0)/Mg(1.90)/PO4(6.8)    08-28 @ 00:30  Na(137)/K(3.8)/Cl(100)/HCO3(24)/BUN(60)/Cr(2.28)Glu(233)/Ca(7.7)/Mg(--)/PO4(--)    08-27 @ 12:30  Na(137)/K(3.6)/Cl(100)/HCO3(24)/BUN(56)/Cr(2.19)Glu(209)/Ca(7.8)/Mg(2.00)/PO4(6.2)    08-27 @ 04:03  Na(136)/K(4.6)/Cl(99)/HCO3(19)/BUN(98)/Cr(3.33)Glu(177)/Ca(7.5)/Mg(2.20)/PO4(9.3)    08-26 @ 19:25  Na(136)/K(4.5)/Cl(100)/HCO3(18)/BUN(96)/Cr(3.19)Glu(168)/Ca(7.6)/Mg(2.30)/PO4(8.8)    08-26 @ 02:15  Na(134)/K(4.5)/Cl(99)/HCO3(19)/BUN(93)/Cr(3.08)Glu(155)/Ca(7.5)/Mg(--)/PO4(--)    08-25 @ 15:30    7.28/51/81      IMPRESSION: 73F w/ HTN, DM2, CVA, breast CA-bilateral mastectomy, recurrent aspiration pneumonia/respiratory failure, and CKD, 8/11/23 p/w acute hypercapnic respiratory failure; c/b RUSS    (1)CKD - stage 4     (2)RUSS - borderline oliguric AIN vs ATN - last dialyzed Saturday 8/26/23 - Further HD warranted here in effort to facilitate extubation    (3)A/I - ?AIN - on empiric Prednisone 40mg qd - we can plan for 2 week course of 40mg; can start tapering as of 9/1/23.    (4)Hyperphosphatemia - renally mediated - PO4 high but acceptable for now, on Renvela powder    (5)Pulm- hypercapnic respiratory failure on admission - remains intubated    (6)CV - hypertension - reliant of late on Nicardipine gtt, high-dose Labetalol, PO Clonidine, and Bumex      RECOMMEND:  (1)HD again today - 3L UF as able; likely PUF tomorrow  (2)Can d/c Bumex gtt at this point  (3)Prednisone 40mg po qd through 9/1/23, then start tapering  (4)Renvela as ordered  (5)Dose new meds for GFR ~15ml/min                Jimmy Colon MD  [facility]  Office/on call physician: [phone]  Cell (7a-7p): [phone]

## 2023-08-28 NOTE — PROGRESS NOTE ADULT - SUBJECTIVE AND OBJECTIVE BOX
*******************************  Martha Douglas PGY-2  *******************************    INTERVAL HPI/OVERNIGHT EVENTS: Required cardene temporarily overnight for hypertension.    SUBJECTIVE: Patient seen and examined at bedside, remains intubated with minimal sedation. Denies pain or discomfort.    OBJECTIVE:    VITAL SIGNS:  ICU Vital Signs Last 24 Hrs  T(C): 36.8 (28 Aug 2023 11:40), Max: 37.4 (28 Aug 2023 00:00)  T(F): 98.3 (28 Aug 2023 11:40), Max: 99.3 (28 Aug 2023 00:00)  HR: 68 (28 Aug 2023 12:21) (55 - 99)  BP: --  BP(mean): --  ABP: 149/40 (28 Aug 2023 12:21) (129/33 - 171/46)  ABP(mean): 78 (28 Aug 2023 12:21) (62 - 92)  RR: 15 (28 Aug 2023 12:21) (12 - 26)  SpO2: 98% (28 Aug 2023 12:21) (93% - 100%)    O2 Parameters below as of 28 Aug 2023 07:00  Patient On (Oxygen Delivery Method): ventilator, cpap 8/5    O2 Concentration (%): 30      Mode: CPAP with PS, FiO2: 30, PEEP: 5, PS: 8, MAP: 8, PIP: 13    08-27 @ 07:01  -  08-28 @ 07:00  --------------------------------------------------------  IN: 870.7 mL / OUT: 450 mL / NET: 420.7 mL    08-28 @ 07:01 - 08-28 @ 12:25  --------------------------------------------------------  IN: 0 mL / OUT: 135 mL / NET: -135 mL      CAPILLARY BLOOD GLUCOSE      POCT Blood Glucose.: 156 mg/dL (28 Aug 2023 12:06)        PHYSICAL EXAM:  GENERAL: NAD, lying in bed, intubated  HEAD:  Atraumatic, normocephalic  EYES: EOMI, PERRLA, conjunctiva and sclera clear  NECK: Supple, trachea midline, no JVD  HEART: Regular rate and rhythm, no murmurs, rubs, or gallops  LUNGS: Unlabored respirations on mechanical vent.  Clear to auscultation bilaterally, no crackles, wheezing, or rhonchi  ABDOMEN: Soft, nontender, nondistended, +BS  EXTREMITIES: 2+ peripheral pulses bilaterally, cap refill<2 secs. No clubbing, cyanosis. +Pitting edema bilaterally  NERVOUS SYSTEM:  Mild sedation, opens eyes to verbal stimuli, following commands intermittently, moving all extremities, no focal deficits   SKIN: No rashes or lesions      MEDICATIONS:  MEDICATIONS  (STANDING):  albuterol/ipratropium for Nebulization 3 milliLiter(s) Nebulizer every 8 hours  aspirin  chewable 81 milliGRAM(s) Oral daily  atorvastatin 40 milliGRAM(s) Oral at bedtime  buMETAnide Infusion 4 mG/Hr (20 mL/Hr) IV Continuous <Continuous>  chlorhexidine 0.12% Liquid 15 milliLiter(s) Oral Mucosa every 12 hours  chlorhexidine 2% Cloths 1 Application(s) Topical daily  cloNIDine 0.2 milliGRAM(s) Oral every 8 hours  dexMEDEtomidine Infusion 1 MICROgram(s)/kG/Hr (16.6 mL/Hr) IV Continuous <Continuous>  dextrose 5%. 1000 milliLiter(s) (100 mL/Hr) IV Continuous <Continuous>  dextrose 5%. 1000 milliLiter(s) (50 mL/Hr) IV Continuous <Continuous>  dextrose 50% Injectable 25 Gram(s) IV Push once  dextrose 50% Injectable 12.5 Gram(s) IV Push once  dextrose 50% Injectable 25 Gram(s) IV Push once  glucagon  Injectable 1 milliGRAM(s) IntraMuscular once  heparin   Injectable 5000 Unit(s) SubCutaneous every 8 hours  insulin lispro (ADMELOG) corrective regimen sliding scale   SubCutaneous every 6 hours  labetalol 600 milliGRAM(s) Oral three times a day  niCARdipine Infusion 2.5 mG/Hr (12.5 mL/Hr) IV Continuous <Continuous>  pantoprazole  Injectable 40 milliGRAM(s) IV Push two times a day  predniSONE   Tablet 40 milliGRAM(s) Oral daily  sevelamer carbonate Powder 1600 milliGRAM(s) Oral three times a day    MEDICATIONS  (PRN):  acetaminophen   Oral Liquid .. 650 milliGRAM(s) Oral every 6 hours PRN Temp greater or equal to 38C (100.4F), Mild Pain (1 - 3)  dextrose Oral Gel 15 Gram(s) Oral once PRN Blood Glucose LESS THAN 70 milliGRAM(s)/deciliter      ALLERGIES:  Allergies    isoniazid (Rash)  nafcillin (Unknown)  hydrALAZINE (Rash)  vitamin E (Short breath; Urticaria; Hives)  doxycycline (Rash)  cefepime (Rash)  NIFEdipine (Urticaria; Hives)    Intolerances        LABS:                        6.9    14.41 )-----------( 280      ( 28 Aug 2023 05:25 )             21.2     08-28    137  |  99  |  64<H>  ----------------------------<  227<H>  4.1   |  22  |  2.55<H>    Ca    8.0<L>      28 Aug 2023 00:30  Phos  6.8     08-28  Mg     1.90     08-28    TPro  5.5<L>  /  Alb  2.5<L>  /  TBili  0.2  /  DBili  x   /  AST  10  /  ALT  17  /  AlkPhos  121<H>  08-28    PT/INR - ( 28 Aug 2023 00:30 )   PT: 11.9 sec;   INR: 1.06 ratio         PTT - ( 28 Aug 2023 00:30 )  PTT:32.9 sec  Urinalysis Basic - ( 28 Aug 2023 00:30 )    Color: x / Appearance: x / SG: x / pH: x  Gluc: 227 mg/dL / Ketone: x  / Bili: x / Urobili: x   Blood: x / Protein: x / Nitrite: x   Leuk Esterase: x / RBC: x / WBC x   Sq Epi: x / Non Sq Epi: x / Bacteria: x        RADIOLOGY & ADDITIONAL TESTS: Reviewed.

## 2023-08-28 NOTE — PROGRESS NOTE ADULT - CRITICAL CARE SERVICES
Subjective   Barbie Pickett is a 21 y.o. female.     You have chosen to receive care through a telehealth visit.  Do you consent to use a video/au  breanne connection for your medical care today? Yes    I did not complete this video visit with the patient using fitaboratet but rather Doximity.    Chief Complaint  Seen today for a scheduled reassessment of multiple medical problems including ADD, chronic allergic rhinitis, and prediabetes    History of Present Illness     ADD  She was diagnosed approximately 5 years ago after presenting with a long history of difficulty with her concentration.  This has been associated with procrastination and fatigue. She has noted an increase in her symptoms over the last 6 months.  She admits to some loss of interest in activities but contributes this to fatigue.  She denies any depression, anxiety, changes in her appetite, or difficulty sleeping.  She remains on adderall xr 25 with no apparent side effects    Chronic Allergic Rhinitis  She has a long history of intermittent sneezing, rhinorrhea, nasal congestion, and postnasal drip.  These symptoms have remained under good control since last here.  There is no history of any other upper respiratory tract symptoms and she continues to deny any cough, shortness of breath. fever, chills, or night sweats.  She remains on fluticasone nasal spray 2 sprays bilaterally twice daily along with cetirizine 10 daily as needed    Prediabetes  She was previously treated with metformin after blood work revealed her to be prediabetic.  She is following an appropriate diet but admits she could be more active.  She has had no recent labs    The following portions of the patient's history were reviewed and updated as appropriate: allergies, current medications, past medical history, past social history and problem list.    Review of Systems   Constitutional: Positive for fatigue. Negative for appetite change, chills, fever and unexpected weight change.    HENT: Negative for congestion, ear pain, postnasal drip, rhinorrhea, sneezing and sore throat.    Eyes: Negative for visual disturbance.   Respiratory: Negative for cough, shortness of breath and wheezing.    Cardiovascular: Negative for chest pain, palpitations and leg swelling.   Gastrointestinal: Negative for abdominal pain, blood in stool, constipation, diarrhea, nausea and vomiting.   Genitourinary: Negative for dysuria and hematuria.   Musculoskeletal: Negative for arthralgias and back pain.   Skin: Negative for rash.   Neurological: Negative for weakness, numbness and headaches.   Psychiatric/Behavioral: Positive for decreased concentration. Negative for dysphoric mood and sleep disturbance. The patient is not nervous/anxious.      Objective   Physical Exam  Constitutional:       Comments: Bright and in good spirits. No apparent distress. No pallor, jaundice, diaphoresis, or cyanosis.   Pulmonary:      Comments: Normal respiratory effort.  No cough or audible wheezing  Skin:     Coloration: Skin is not cyanotic, jaundiced or pale.   Neurological:      Mental Status: She is alert and oriented to person, place, and time.      Cranial Nerves: Cranial nerves are intact.   Psychiatric:         Attention and Perception: Attention normal.         Mood and Affect: Mood normal.         Speech: Speech normal.         Behavior: Behavior normal.         Thought Content: Thought content normal.       Assessment & Plan   Problems Addressed this Visit        Allergies and Adverse Reactions    Chronic allergic rhinitis   Symptoms are well controlled  Encouraged to report if this should change.  Continue current medication    Relevant Medications    cetirizine (zyrTEC) 10 MG tablet    mometasone (NASONEX) 50 MCG/ACT nasal spray       Endocrine and Metabolic    Prediabetes  Encouraged to continue to work on her diet and exercise plan.  Scheduled for updated labs    Relevant Orders    Comprehensive Metabolic Panel    Lipid  Panel    TSH    Hemoglobin A1c       Health Encounters    Healthcare maintenance  Patient remains uninterested in HPV or COVID-19 vaccination    Relevant Orders    Lipid Panel    Hepatitis C Antibody       Mental Health    Attention deficit disorder (ADD) without hyperactivity  Supportive therapy  Reviewed options and agreed on a titration of Adderall XR.  Patient will report her progress in 1 month but will let us know sooner if any concerns    Relevant Medications    amphetamine-dextroamphetamine XR (Adderall XR) 30 MG 24 hr capsule    Other Relevant Orders    CBC & Differential    Comprehensive Metabolic Panel    TSH      Diagnoses       Codes Comments    Chronic allergic rhinitis    -  Primary ICD-10-CM: J30.9  ICD-9-CM: 477.9     Prediabetes     ICD-10-CM: R73.03  ICD-9-CM: 790.29     Healthcare maintenance     ICD-10-CM: Z00.00  ICD-9-CM: V70.0     Attention deficit disorder (ADD) without hyperactivity     ICD-10-CM: F98.8  ICD-9-CM: 314.00                   35

## 2023-08-28 NOTE — PROGRESS NOTE ADULT - SUBJECTIVE AND OBJECTIVE BOX
Patient is a 73y old  Female who presents with a chief complaint of Respiratory distress (28 Aug 2023 12:25)      SUBJECTIVE / OVERNIGHT EVENTS: intubated,  on precedex, s/p 3LUF today, on bumex drip, renal suggests DC BUMEX, cardene drip resumed for BP control, CPAP trials ongoing, but ptn became acidotic, prob will need a trache    MEDICATIONS  (STANDING):  albuterol/ipratropium for Nebulization 3 milliLiter(s) Nebulizer every 8 hours  aspirin  chewable 81 milliGRAM(s) Oral daily  atorvastatin 40 milliGRAM(s) Oral at bedtime  buMETAnide Infusion 4 mG/Hr (20 mL/Hr) IV Continuous <Continuous>  chlorhexidine 0.12% Liquid 15 milliLiter(s) Oral Mucosa every 12 hours  chlorhexidine 2% Cloths 1 Application(s) Topical daily  cloNIDine 0.2 milliGRAM(s) Oral every 8 hours  dexMEDEtomidine Infusion 1 MICROgram(s)/kG/Hr (16.6 mL/Hr) IV Continuous <Continuous>  dextrose 5%. 1000 milliLiter(s) (100 mL/Hr) IV Continuous <Continuous>  dextrose 5%. 1000 milliLiter(s) (50 mL/Hr) IV Continuous <Continuous>  dextrose 50% Injectable 25 Gram(s) IV Push once  dextrose 50% Injectable 12.5 Gram(s) IV Push once  dextrose 50% Injectable 25 Gram(s) IV Push once  glucagon  Injectable 1 milliGRAM(s) IntraMuscular once  heparin   Injectable 5000 Unit(s) SubCutaneous every 8 hours  insulin lispro (ADMELOG) corrective regimen sliding scale   SubCutaneous every 6 hours  labetalol 600 milliGRAM(s) Oral three times a day  niCARdipine Infusion 2.5 mG/Hr (12.5 mL/Hr) IV Continuous <Continuous>  pantoprazole  Injectable 40 milliGRAM(s) IV Push two times a day  predniSONE   Tablet 40 milliGRAM(s) Oral daily  sevelamer carbonate Powder 1600 milliGRAM(s) Oral three times a day    MEDICATIONS  (PRN):  acetaminophen   Oral Liquid .. 650 milliGRAM(s) Oral every 6 hours PRN Temp greater or equal to 38C (100.4F), Mild Pain (1 - 3)  dextrose Oral Gel 15 Gram(s) Oral once PRN Blood Glucose LESS THAN 70 milliGRAM(s)/deciliter      Vital Signs Last 24 Hrs  T(F): 97.8 (08-28-23 @ 20:00), Max: 99.3 (08-28-23 @ 00:00)  HR: 74 (08-28-23 @ 20:00) (65 - 77)  BP: --  RR: 20 (08-28-23 @ 20:00) (14 - 26)  SpO2: 100% (08-28-23 @ 20:00) (92% - 100%)  Telemetry:   CAPILLARY BLOOD GLUCOSE      POCT Blood Glucose.: 208 mg/dL (28 Aug 2023 17:56)  POCT Blood Glucose.: 156 mg/dL (28 Aug 2023 12:06)  POCT Blood Glucose.: 212 mg/dL (28 Aug 2023 05:27)  POCT Blood Glucose.: 229 mg/dL (28 Aug 2023 00:27)    I&O's Summary    27 Aug 2023 07:01  -  28 Aug 2023 07:00  --------------------------------------------------------  IN: 870.7 mL / OUT: 450 mL / NET: 420.7 mL    28 Aug 2023 07:01  -  28 Aug 2023 20:40  --------------------------------------------------------  IN: 1276.5 mL / OUT: 4215 mL / NET: -2938.5 mL        PHYSICAL EXAM:  GENERAL: NAD, well-developed  HEAD:  Atraumatic, Normocephalic  EYES: EOMI, PERRLA, conjunctiva and sclera clear  NECK: Supple, No JVD  CHEST/LUNG: Clear to auscultation bilaterally; No wheeze  HEART: Regular rate and rhythm; No murmurs, rubs, or gallops  ABDOMEN: Soft, Nontender, Nondistended; Bowel sounds present  EXTREMITIES:  2+ Peripheral Pulses, No clubbing, cyanosis, or edema  PSYCH: AAOx3  NEUROLOGY: non-focal  SKIN: No rashes or lesions    LABS:                        6.9    14.41 )-----------( 280      ( 28 Aug 2023 05:25 )             21.2     08-28    137  |  99  |  64<H>  ----------------------------<  227<H>  4.1   |  22  |  2.55<H>    Ca    8.0<L>      28 Aug 2023 00:30  Phos  6.8     08-28  Mg     1.90     08-28    TPro  5.5<L>  /  Alb  2.5<L>  /  TBili  0.2  /  DBili  x   /  AST  10  /  ALT  17  /  AlkPhos  121<H>  08-28    PT/INR - ( 28 Aug 2023 00:30 )   PT: 11.9 sec;   INR: 1.06 ratio         PTT - ( 28 Aug 2023 00:30 )  PTT:32.9 sec      Urinalysis Basic - ( 28 Aug 2023 00:30 )    Color: x / Appearance: x / SG: x / pH: x  Gluc: 227 mg/dL / Ketone: x  / Bili: x / Urobili: x   Blood: x / Protein: x / Nitrite: x   Leuk Esterase: x / RBC: x / WBC x   Sq Epi: x / Non Sq Epi: x / Bacteria: x        RADIOLOGY & ADDITIONAL TESTS:    Imaging Personally Reviewed:    Consultant(s) Notes Reviewed:      Care Discussed with Consultants/Other Providers:

## 2023-08-28 NOTE — PROGRESS NOTE ADULT - ASSESSMENT
74 y/o F DM on insulin, HTN, CKD, CHFpEF, breast CA, respiratory arrest and cardiac arrest (2018), CVA with residual weakness, aspiration PNA h/o trache, PEG, both removed presents with respiratory distress requiring intubation. May be volume overload i/s/o CHF/CKD vs decrease respiratory drive (given oxygen at home). Improving mental status however worsening renal function and anuria, concerning for ATN vs AIN.     Neuro   #AMS  #Metabolic encephalopathy   #CVA   Baseline MS AOx3 with short term memory changes per family  - MRI brain 8/17 showed right cerebellar infarct (new) with old left PCA/occipital infarcts, likely embolic in nature - STEPHANIE with no TRINITY thrombus or intracardiac shunts  - Now with mild sedation with Precedex for ventilator synchrony  - Intermittent shaking noted, per family happens at baseline but more pronounced here; EEG without epileptiform activity, per neuro no need for LP  - Restarted aspirin 8/26    Cardiac   #Hypertension; SBP max 200s ? iso renal dysfunction/overlaod, now 130s-140s  - Labetalol 600 TID - will hold off on further increases to prevent bradycardia  - Clonidine increased to 0.2 TID (8/26)   - Will obtain med rec from family and see which medications can be restarted    #Shock  - Resolved     #CHFpEF   - TTE 7/2023 with EF 59%, with severe LVH and diastolic dysfunction   - pro-BNP > 77421, trop 78   - Repeat TTE with EF 60% normal systolic and grade 1 diastolic dysfunction     Pulmonary   #Acute hypercapnic and hypoxemic respiratory failure   - DDx: aspiration vs volume overload vs sedating medication decreasing drive (was given nyquil)   - Previously did not tolerate CPAP trials  - Will continue CPAP trial today, in preparation for extubation  - If fails SBT trials, may need trach    /Renal   #RUSS on CKD  - Ddx: obstructive (willard in, no hydro on POCUS) vs low flow state vs AIN i/s/o cephalosporin use and drug rash   - kidney biopsy to r/o AIN planned 8/22, however family felt procedure too risky and so deferred  - cont empiric prednisone 40mg started 8/18   - Continue to monitor, dose meds per eGFR. avoid nephrotoxic agents  - first HD session 8/19, most recent session 8/26 - will get another session 8/28  - Strict IOs per nephro; Willard in place for UOP monitoring  - c/w prednisone 40 for 14 days per nephro for AIN, then taper (8/18-     # Hyperphosphatemia  - Continue to monitor BMP q12  - Started on renvela powder TID per nephro recs    GI  - Had one episode of coffee ground emesis 8/24, hemoglobin stable  - GI consulted, no plan for scope  - Continue PPI IV BID iso recent CGE, will transition to PO post extubation  - Restarted tube feeds    Endocrine  #T2DM   - ISS  - NPH DC'd while NPO, will restart if BG increase now that tube feeds are started    ID   Started on empiric vancomycin and aztreonam (cefepime/zosyn allergy? developed rash req prednisone)   Trialed cefepime --> cefazolin (sputum MSSA), developed drug eruption - discontinued   Blood and urine cultures 8/11 demonstrating no growth currently, no clinical signs of infection  - Monitor off antibiotics    Heme/Onc  ppx: heparin q8 hrs   - Hgb 6.9, will transfuse 1 unit with HD    Ethics  GOC - Per previous GOC with daughter and , reported that they have not talked about end of life care with the patient. For now, they wanted "everything to be done" including CPR, continuing with mech ventilation/intubation, pressors

## 2023-08-28 NOTE — PROGRESS NOTE ADULT - ASSESSMENT
74 y/o F well known to me from my [facility]. she was admitted at [facility] 7/12-7/22 w aspiration PNA, was treated w CEFEPIME, developed an allergic rash,  dCHF, + MAC on AFB culture, had been progressively getting more and more lethargic and dyspneic at home since DC.   In  am of 8/11/23  ptn presented with respiratory distress w hypoxia and hypercarbia requiring intubation 2/2 volume overload +/- Asp PNA      Neuro   responds appropriately while intubated  Baseline MS AOx3, aphasic   - h/o CVA , on aspirin and statin . 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 probably embolic in nature, did not tolerate full AC in the past, STEPHANIE is neg , no shunts observed      Cardiac   Ptn well known to Dr. Dunn, consult reviewed   CHFpEF   TTE 7/2023 with EF 59%, with severe LVH and diastolic dysfunction   pro-BNP > 44973, trop 78   on cardine drip for bp control and labetalol po tid 600 mg, clonidine raised to 0.2 mg tid     Pulmonary   Acute hypercapnic and hypoxemic respiratory failure   well known to Dr. Mcnulty, consult reviewed   DDx: aspiration vs volume overload   VBG with respiratory acidosis pCO2 111  CT chest: mod ( worsening) R pl effusion, h/o RUL cavity, +MAC  - cefepime DCed on 8/13, started on Moxifloxacin on 8/13, since 8/14 off ABx  - intubated,  on precedex, , CPAP trials ongoing, but ptn became acidotic, prob will need a trache    Renal   Hyperkalemia with EKG changes  , initially treated w LOKELMA,  now resolved   Ptn well know to Dr. Colon, consult reviewed      worsening renal function, oliguric, fluid overloaded, on Bumex drip, worsening azotemia, may need HD, renal following. BP elevated, on labetalol cardene, clonidine, on empiric steroids for AIN ,    HD 8/19,  8/21, 8/26 and 8/28. tolerating UF  cont HD as per renal  on bumex  drip , but renal recommends to DC  renal biopsy has been deferred , ASA  resumed, AC on hold    GI  NPO for now, on tube feeds  coffee ground emesis x 1 8/24, now on IV PPI,     Endocrine  T2DM   A1C 7.1 in 7/2023   - BG goal 140-200     ID   No fever/leukocytosis, recheck temp   Hx latent TB which was treated, no concern for TB   - grew MAC on AFB culture from most recent admission. would call ID consult  Started on empiric vancomycin and aztreonam,  changed to cefepime 8/12, cefepime dced on 8/13(cefepime/zosyn allergy.  developed rash when on cefepime on previous admission ) . started on AVALOX on 8/13, off ABx on 8/14. ptn has an allergic rash, prob 2/2 cefepime  - f/u MRSA   - all cxs NTD    Heme/Onc  ppx: heparin q8 hrs     Ethics  GOC - Discussed GOC with daughter and , they have opted in the past for full code. and she remains full code at present

## 2023-08-28 NOTE — PROGRESS NOTE ADULT - ASSESSMENT
74 y/o F DM on insulin, HTN, CKD, CHFpEF, breast CA, respiratory arrest and cardiac arrest (2018), CVA with residual weakness, aspiration PNA h/o trache, PEG, both removed presents with respiratory distress requiring intubation. Found to have elevated BNP, may be 2/2 to volume overload i/s/o CKD vs CHF.     Neuro ;  - Sedated : Baseline MS AOx3   -WAS ON PROPOFOL AND FENTANYL BEFORE: NOW OFF:    - Monitor her mental status:  requiring only 30% fio2:    -8/15: - now she has been off sedation for more then 24 hours but is still lethargic: her o2 requirement has not increased:   -for possible extubation if she wakes up   :: -dw PMD: pt is still lethargic  for MRI brain stem today   ": -had ct head: OK: awaiting MRI brain : still lethargic  : seemed same to me: family at bedside who says she is little  bit more awake: oxygen requirement is same:  at 30%:off vasopressors   -now neuro note reviewed:  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 reason for inability ot trigger vent and co2 retention initially ? candidate for trach    -now the co2 i s normal : not much improvement in her mental statis:  off sedation for days   /: still lethargic: on precedex;  cxr reviewed:  last time at [facility] she was found to have cavity in RUL : she was ruled out for tb : her AFB was positive but was MAC /; defer to MICU   "  still sedated:  no sob:  on 30% fio2:  on precedex:  and is on nicardipine as well as labetalol ; for renal biopsy today  : seems OK: more alert and awake:  renal biopsy is still pending:  la [place] family   : doing OK: alert and awake:  but still intubated:  getting cpap : no renal biopsy done   : notable to be weaned:  probably trach next week if she is not extubated by early next week : restarted on bumex  : doing same on cpap trials at this time:  cont iv diuresis per renal ; on predniosne for renal issues:   : seems OK: no sob:  nocugh : no phlegm : on cpap trial: ? extubate L vs trach    : she is hypercarbic acidotic today while being on cpap trials at 8  : the talk is ongoing for extubation trial or trach  : family seems  interested in reintubation in the event if she is extubated:   CVA   - cont aspirin and statin   //-per neurology   : sedated  neuro followin/23:edation:  seems to be doing better:  plan for extubation if family refused for biopsy   : more awake and alert :   : stable  : sheis awake but is lethargic  : she is little bit more alert today  :   : mentally she is doing same:  still lethargic but open eyes and respond to questions  Cardiac   -CHFpEF : TTE 2023 with EF 59%, with severe LVH and diastolic dysfunction : pro-BNP > 86304, trop 78   -on bumex drip :  -cards following   8/15: : she is off bumex drip and is on midodrine   : remains off bumex  :: off bumex:  defer to MICU team   cont to remain off diuretics   : she seems same to me: in renal failure off diuretics   : per Micu   : seems stable  : back  on bumex drip    : cont bumex drip    : diuretics per renal   : sheis due for hd today  : dw renal : removing 3 L of fluid today  :   Pulmonary   -Acute hypercapnic and hypoxemic respiratory failure   -DDx: aspiration vs volume overload  VBG with respiratory acidosis pCO2 111  - CXR with small right pleural effusion, no consolidations/opacities  -now she seems better:  fio2: 30$:  -? etiology of hypercarbia:  multifactorial : seems to have OHS and TYRONE superimposed by acute chf  ? and aspiration pneumonia:"   -pt is mostly bed bound:   -it is possible that this time:  she mght need bipap on dc : atleast at the night time:   -on 8/15:  still remains intubated  :still lethargic:  not extubated hypercarbic acidotic today on abg:  weaning trials :probably would need bipap following extubation  : :still lethargic: :awaiting mri brain : dw    : :MRI done has new right cerebellar infarct on mri : defer to MICU   -seen by bora now:  has brain stem infarct to per neuro note:  reason for being vent;    : awaiting renal bipsy today  : and then aggressive weaning  : now no renal biopsy :    : still sightly hypoxic but good sao2:  cont to wean and extubate if needed  still has AMS   :? extubation vs trach   : being weaned: plan as above   /Renal   -Hyperkalemia with EKG changes  : 7.2, hemolyzed, given insulin and D50 + calcium gluc   -K is better today : cont to monitor  -normal today on 8/15   8/16:stable  :-k has been ok for l ast few days  : stable  : started on prednisone yesterday for suspected AIN by renal    : wbc is high : likely secondary to steroids  she has no fveR:  ruled out for tb last time   : for renal biopsy today   : RENAL  BIOPSY NOT DONE:  FAMILY IS THINKING about itl   : biopsy not done: urine output increased:   : doing  ok : no sob:  no biopsy for now:   : per primary pulm tea in MICU   : on cpap trial: hypercarbic today  :   Endocrine  T2DM : A1C 7.1 in 2023   -cont to monitor blood glucose  ID   - No fever/leukocytosis  - Hx latent TB which was treated, no concern for TB  : She was recently ruled out for tuberculosis at [facility]   - Started on empiric vancomycin and aztreonam (cefepime/zosyn allergy? developed rash req prednisone)  : now dced:   defer to MICU   - f/u MRSA   - follow up blood culture/urine   :blood cultures negative so far: legionella is negative:  remains off antibiotics  staph aureus on sputum   :: off antibiotics at this time: secondary to drug eruption  : remains off antibiotics   -: off diuretics   : off antibiotics   : off antibiotics   : off antibiotics   : off antibtiocs  : remains afebrile:     dw micu and  at bedside   overall prognosis seems guarded

## 2023-08-28 NOTE — PROGRESS NOTE ADULT - SUBJECTIVE AND OBJECTIVE BOX
Date of Service: 08-28-23 @ 10:38    Patient is a 73y old  Female who presents with a chief complaint of Respiratory distress (28 Aug 2023 10:22)      Any change in ROS: seems same:  on cpap trial at ps of 8      MEDICATIONS  (STANDING):  albuterol/ipratropium for Nebulization 3 milliLiter(s) Nebulizer every 8 hours  aspirin  chewable 81 milliGRAM(s) Oral daily  atorvastatin 40 milliGRAM(s) Oral at bedtime  buMETAnide Infusion 4 mG/Hr (20 mL/Hr) IV Continuous <Continuous>  chlorhexidine 0.12% Liquid 15 milliLiter(s) Oral Mucosa every 12 hours  chlorhexidine 2% Cloths 1 Application(s) Topical daily  cloNIDine 0.2 milliGRAM(s) Oral every 8 hours  dexMEDEtomidine Infusion 1 MICROgram(s)/kG/Hr (16.6 mL/Hr) IV Continuous <Continuous>  dextrose 5%. 1000 milliLiter(s) (100 mL/Hr) IV Continuous <Continuous>  dextrose 5%. 1000 milliLiter(s) (50 mL/Hr) IV Continuous <Continuous>  dextrose 50% Injectable 25 Gram(s) IV Push once  dextrose 50% Injectable 12.5 Gram(s) IV Push once  dextrose 50% Injectable 25 Gram(s) IV Push once  glucagon  Injectable 1 milliGRAM(s) IntraMuscular once  heparin   Injectable 5000 Unit(s) SubCutaneous every 8 hours  insulin lispro (ADMELOG) corrective regimen sliding scale   SubCutaneous every 6 hours  labetalol 600 milliGRAM(s) Oral three times a day  niCARdipine Infusion 2.5 mG/Hr (12.5 mL/Hr) IV Continuous <Continuous>  pantoprazole  Injectable 40 milliGRAM(s) IV Push two times a day  predniSONE   Tablet 40 milliGRAM(s) Oral daily  sevelamer carbonate Powder 1600 milliGRAM(s) Oral three times a day    MEDICATIONS  (PRN):  acetaminophen   Oral Liquid .. 650 milliGRAM(s) Oral every 6 hours PRN Temp greater or equal to 38C (100.4F), Mild Pain (1 - 3)  dextrose Oral Gel 15 Gram(s) Oral once PRN Blood Glucose LESS THAN 70 milliGRAM(s)/deciliter    Vital Signs Last 24 Hrs  T(C): 37.3 (28 Aug 2023 04:00), Max: 37.4 (28 Aug 2023 00:00)  T(F): 99.1 (28 Aug 2023 04:00), Max: 99.3 (28 Aug 2023 00:00)  HR: 66 (28 Aug 2023 10:00) (55 - 99)  BP: --  BP(mean): --  RR: 16 (28 Aug 2023 10:00) (11 - 26)  SpO2: 96% (28 Aug 2023 10:00) (93% - 100%)    Parameters below as of 28 Aug 2023 07:00  Patient On (Oxygen Delivery Method): ventilator, cpap 8/5    O2 Concentration (%): 30  Mode: CPAP with PS  FiO2: 30  PEEP: 5  PS: 8  MAP: 8  PIP: 13    I&O's Summary    27 Aug 2023 07:01  -  28 Aug 2023 07:00  --------------------------------------------------------  IN: 870.7 mL / OUT: 450 mL / NET: 420.7 mL          Physical Exam:   GENERAL: NAD, well-groomed, well-developed  HEENT: MANDY/   Atraumatic, Normocephalic  ENMT: No tonsillar erythema, exudates, or enlargement; Moist mucous membranes, Good dentition, No lesions  NECK: Supple, No JVD, Normal thyroid  CHEST/LUNG: Clear to auscultaion, ; No rales, rhonchi, wheezing, or rubs  CVS: Regular rate and rhythm; No murmurs, rubs, or gallops  GI: : Soft, Nontender, Nondistended; Bowel sounds present  NERVOUS SYSTEM:  awake and responsive but lethargic:   EXTREMITIES:+edema  LYMPH: No lymphadenopathy noted  SKIN: No rashes or lesions  ENDOCRINOLOGY: No Thyromegaly  PSYCH: calm     Labs:  ABG - ( 28 Aug 2023 00:30 )  pH, Arterial: 7.28  pH, Blood: x     /  pCO2: 51    /  pO2: 81    / HCO3: 24    / Base Excess: -2.7  /  SaO2: 97.8                                        6.9    14.41 )-----------( 280      ( 28 Aug 2023 05:25 )             21.2                         7.0    14.07 )-----------( 294      ( 28 Aug 2023 00:30 )             21.9                         7.2    13.06 )-----------( 321      ( 27 Aug 2023 04:03 )             21.7                         7.1    9.57  )-----------( 340      ( 26 Aug 2023 19:25 )             22.1                         7.4    10.34 )-----------( 341      ( 26 Aug 2023 02:15 )             23.2                         7.8    14.96 )-----------( 345      ( 25 Aug 2023 01:30 )             24.2                         7.5    18.82 )-----------( 331      ( 24 Aug 2023 15:20 )             24.0     08-28    137  |  99  |  64<H>  ----------------------------<  227<H>  4.1   |  22  |  2.55<H>  08-27    137  |  100  |  60<H>  ----------------------------<  233<H>  3.8   |  24  |  2.28<H>  08-27    137  |  100  |  56<H>  ----------------------------<  209<H>  3.6   |  24  |  2.19<H>  08-26    136  |  99  |  98<H>  ----------------------------<  177<H>  4.6   |  19<L>  |  3.33<H>  08-26    136  |  100  |  96<H>  ----------------------------<  168<H>  4.5   |  18<L>  |  3.19<H>  08-25    134<L>  |  99  |  93<H>  ----------------------------<  155<H>  4.5   |  19<L>  |  3.08<H>  08-25    136  |  99  |  87<H>  ----------------------------<  144<H>  4.4   |  22  |  2.97<H>  08-24    135  |  100  |  80<H>  ----------------------------<  144<H>  4.3   |  22  |  2.82<H>    Ca    8.0<L>      28 Aug 2023 00:30  Ca    7.7<L>      27 Aug 2023 12:30  Ca    7.8<L>      27 Aug 2023 04:03  Ca    7.5<L>      26 Aug 2023 19:25  Phos  6.8     08-28  Phos  6.2     08-27  Phos  9.3     08-26  Mg     1.90     08-28  Mg     2.00     08-27  Mg     2.20     08-26    TPro  5.5<L>  /  Alb  2.5<L>  /  TBili  0.2  /  DBili  x   /  AST  10  /  ALT  17  /  AlkPhos  121<H>  08-28  TPro  5.7<L>  /  Alb  2.5<L>  /  TBili  0.2  /  DBili  x   /  AST  16  /  ALT  21  /  AlkPhos  142<H>  08-27  TPro  6.0  /  Alb  2.7<L>  /  TBili  0.3  /  DBili  x   /  AST  36<H>  /  ALT  43<H>  /  AlkPhos  164<H>  08-25  TPro  6.1  /  Alb  2.5<L>  /  TBili  0.3  /  DBili  x   /  AST  59<H>  /  ALT  51<H>  /  AlkPhos  176<H>  08-24    CAPILLARY BLOOD GLUCOSE      POCT Blood Glucose.: 212 mg/dL (28 Aug 2023 05:27)  POCT Blood Glucose.: 229 mg/dL (28 Aug 2023 00:27)  POCT Blood Glucose.: 182 mg/dL (27 Aug 2023 17:29)  POCT Blood Glucose.: 227 mg/dL (27 Aug 2023 12:08)      LIVER FUNCTIONS - ( 28 Aug 2023 00:30 )  Alb: 2.5 g/dL / Pro: 5.5 g/dL / ALK PHOS: 121 U/L / ALT: 17 U/L / AST: 10 U/L / GGT: x           PT/INR - ( 28 Aug 2023 00:30 )   PT: 11.9 sec;   INR: 1.06 ratio         PTT - ( 28 Aug 2023 00:30 )  PTT:32.9 sec  Urinalysis Basic - ( 28 Aug 2023 00:30 )    Color: x / Appearance: x / SG: x / pH: x  Gluc: 227 mg/dL / Ketone: x  / Bili: x / Urobili: x   Blood: x / Protein: x / Nitrite: x   Leuk Esterase: x / RBC: x / WBC x   Sq Epi: x / Non Sq Epi: x / Bacteria: x      rad< from: Xray Chest 1 View- PORTABLE-Urgent (Xray Chest 1 View- PORTABLE-Urgent .) (08.24.23 @ 18:23) >    ACC: [id number] EXAM:  XR CHEST PORTABLE URGENT 1V   ORDERED BY: SARAH DEAL     PROCEDURE DATE:  08/24/2023          INTERPRETATION:  CLINICAL INFORMATION: New onset coffee-ground emesis.    TECHNIQUE: One view of the chest.    COMPARISON: Radiograph chest 8/19/2023.    FINDINGS:  Extremely limited evaluation secondary to patient positioning.    Enteric tube is present.    IMPRESSION:  On this limited study, an enteric tube is present.    --- End of Report ---          CHANCE BLACKBURN MD; Resident Radiologist  This document has been electronically signed.  JERRI FUNK DO; Attending Radiologist  This document has been electronically signed. Aug 25 2023 10:26AM    < end of copied text >  < from: Xray Chest 1 View- PORTABLE-Urgent (Xray Chest 1 View- PORTABLE-Urgent .) (08.19.23 @ 18:37) >    PROCEDURE DATE:  08/19/2023          INTERPRETATION:  EXAMINATION: XR CHEST URGENT    CLINICAL INDICATION: Line Placement    TECHNIQUE: Single frontal, portable view of the chest was obtained.    COMPARISON: Chest x-ray 8/13/2023.    FINDINGS:  Right IJ Shiley in place with catheter tip at the level of the lower SVC.  The heart is normal in size. Cardiac loop recorder in place.  Left jugular central line reaches the upper SVC.  Bilateral pulmonary edema improved from prior study. Right apical pleural   thickening unchanged from prior study.  There is no pneumothorax or pleural effusion.  No acute bony abnormality.    IMPRESSION:  Right IJ Shiley in place with catheter tip at the level of lower SVC.    --- End of Report ---          KEELY JOSE MD; Resident Radiologist  This document has been electronically signed.  KHALIDA JENSEN MD; Attending Interventional Radiologist  This document has been electronically signed. Aug 22 2023 12:29PM    < end of copied text >  < from: MR Head No Cont (08.17.23 @ 19:58) >    Technique: Multi-planar, multi-squence noncontrast brain MRI was   performed.    Comparison: 8/15/2023    Findings:  Diagnostic accuracy is limited secondary to patient motion.  There is an acute infarct involving the right cerebellum. There is no   evidence of acute intracranial hemorrhage. Chronic left PCA territory   infarct with associated ex vacuo dilatation of the occipital horn of the   left lateral ventricle. Ventricles and sulci are prominent consistent   with age-related parenchymal volume loss. No midline shift or other   significant mass effect is noted. Gray-white differentiation is   maintained throughout. Normal flow voids are maintained in the dominant   arterial and venous structures. There are patchy and confluent foci of   hyperintense T2 signal within the subcortical and periventricular white   matter which are nonspecific but likely related to chronic microvascular   ischemic disease.    Moderate mucosal thickening of the paranasal sinuses. The tympanomastoid   spaces are clear. Orbits and orbital contents are unremarkable.    IMPRESSION:    MOTION LIMITED STUDY. ACUTE INFARCT INVOLVING THE RIGHT CEREBELLUM. THERE   IS NO EVIDENCE OF ACUTE INTRACRANIAL HEMORRHAGE.    --- End of Report ---            LUCIA PHAM MD; Attending Radiologist  This document has been electronically signed. Aug 17 2023  8:41PM    < end of copied text >        RECENT CULTURES:        RESPIRATORY CULTURES:          Studies  Chest X-RAY  CT SCAN Chest   Venous Dopplers: LE:   CT Abdomen  Others

## 2023-08-29 NOTE — PROGRESS NOTE ADULT - SUBJECTIVE AND OBJECTIVE BOX
Date of Service: 08-29-23 @ 10:38    Patient is a 73y old  Female who presents with a chief complaint of Respiratory distress (29 Aug 2023 09:16)      Any change in ROS:  Intubated and sleepy     MEDICATIONS  (STANDING):  albuterol/ipratropium for Nebulization 3 milliLiter(s) Nebulizer every 8 hours  aspirin  chewable 81 milliGRAM(s) Oral daily  atorvastatin 40 milliGRAM(s) Oral at bedtime  buMETAnide Infusion 4 mG/Hr (20 mL/Hr) IV Continuous <Continuous>  chlorhexidine 0.12% Liquid 15 milliLiter(s) Oral Mucosa every 12 hours  chlorhexidine 2% Cloths 1 Application(s) Topical daily  cloNIDine 0.2 milliGRAM(s) Oral every 8 hours  dexMEDEtomidine Infusion 1 MICROgram(s)/kG/Hr (16.6 mL/Hr) IV Continuous <Continuous>  dextrose 5%. 1000 milliLiter(s) (100 mL/Hr) IV Continuous <Continuous>  dextrose 5%. 1000 milliLiter(s) (50 mL/Hr) IV Continuous <Continuous>  dextrose 50% Injectable 25 Gram(s) IV Push once  dextrose 50% Injectable 12.5 Gram(s) IV Push once  dextrose 50% Injectable 25 Gram(s) IV Push once  doxazosin 6 milliGRAM(s) Oral <User Schedule>  glucagon  Injectable 1 milliGRAM(s) IntraMuscular once  insulin lispro (ADMELOG) corrective regimen sliding scale   SubCutaneous every 6 hours  labetalol 600 milliGRAM(s) Oral three times a day  niCARdipine Infusion 2.5 mG/Hr (12.5 mL/Hr) IV Continuous <Continuous>  pantoprazole  Injectable 40 milliGRAM(s) IV Push two times a day  predniSONE   Tablet 40 milliGRAM(s) Oral daily  sevelamer carbonate Powder 1600 milliGRAM(s) Oral three times a day    MEDICATIONS  (PRN):  acetaminophen   Oral Liquid .. 650 milliGRAM(s) Oral every 6 hours PRN Temp greater or equal to 38C (100.4F), Mild Pain (1 - 3)  dextrose Oral Gel 15 Gram(s) Oral once PRN Blood Glucose LESS THAN 70 milliGRAM(s)/deciliter    Vital Signs Last 24 Hrs  T(C): 36.9 (29 Aug 2023 08:00), Max: 37.1 (28 Aug 2023 16:00)  T(F): 98.4 (29 Aug 2023 08:00), Max: 98.7 (28 Aug 2023 16:00)  HR: 64 (29 Aug 2023 10:05) (62 - 84)  BP: --  BP(mean): --  RR: 12 (29 Aug 2023 10:05) (12 - 24)  SpO2: 98% (29 Aug 2023 10:05) (92% - 100%)    Parameters below as of 29 Aug 2023 08:00  Patient On (Oxygen Delivery Method): ventilator    O2 Concentration (%): 30  Mode: AC/ CMV (Assist Control/ Continuous Mandatory Ventilation)  RR (machine): 12  TV (machine): 360  FiO2: 30  PEEP: 5  ITime: 0.78  MAP: 10  PIP: 39    I&O's Summary    28 Aug 2023 07:01  -  29 Aug 2023 07:00  --------------------------------------------------------  IN: 2072 mL / OUT: 4760 mL / NET: -2688 mL    29 Aug 2023 07:01  -  29 Aug 2023 10:38  --------------------------------------------------------  IN: 121.5 mL / OUT: 170 mL / NET: -48.5 mL          Physical Exam:   GENERAL: NAD, well-groomed, well-developed  HEENT: MANDY/   Atraumatic, Normocephalic  ENMT: No tonsillar erythema, exudates, or enlargement; Moist mucous membranes, Good dentition, No lesions  NECK: Supple, No JVD, Normal thyroid  CHEST/LUNG: Clear to auscultaion  CVS: Regular rate and rhythm; No murmurs, rubs, or gallops  GI: : Soft, Nontender, Nondistended; Bowel sounds present  NERVOUS SYSTEM: Lethargic  EXTREMITIES: -edema  LYMPH: No lymphadenopathy noted  SKIN: No rashes or lesions  ENDOCRINOLOGY: No Thyromegaly  PSYCH: calm     Labs:  ABG - ( 29 Aug 2023 01:10 )  pH, Arterial: 7.28  pH, Blood: x     /  pCO2: 52    /  pO2: 86    / HCO3: 24    / Base Excess: -2.5  /  SaO2: 98.3          er< from: Xray Chest 1 View- PORTABLE-Urgent (Xray Chest 1 View- PORTABLE-Urgent .) (08.24.23 @ 18:23) >    ACC: [id number] EXAM:  XR CHEST PORTABLE URGENT 1V   ORDERED BY: SARAH DEAL     PROCEDURE DATE:  08/24/2023          INTERPRETATION:  CLINICAL INFORMATION: New onset coffee-ground emesis.    TECHNIQUE: One view of the chest.    COMPARISON: Radiograph chest 8/19/2023.    FINDINGS:  Extremely limited evaluation secondary to patient positioning.    Enteric tube is present.    IMPRESSION:  On this limited study, an enteric tube is present.    --- End of Report ---          CHANCE BLACKBURN MD; Resident Radiologist  This document has been electronically signed.  JERRI FUNK DO; Attending Radiologist  This document has been electronically signed. Aug 25 2023 10:26AM    < end of copied text >  < from: Xray Chest 1 View- PORTABLE-Urgent (Xray Chest 1 View- PORTABLE-Urgent .) (08.19.23 @ 18:37) >      INTERPRETATION:  EXAMINATION: XR CHEST URGENT    CLINICAL INDICATION: Line Placement    TECHNIQUE: Single frontal, portable view of the chest was obtained.    COMPARISON: Chest x-ray 8/13/2023.    FINDINGS:  Right IJ Shiley in place with catheter tip at the level of the lower SVC.  The heart is normal in size. Cardiac loop recorder in place.  Left jugular central line reaches the upper SVC.  Bilateral pulmonary edema improved from prior study. Right apical pleural   thickening unchanged from prior study.  There is no pneumothorax or pleural effusion.  No acute bony abnormality.    IMPRESSION:  Right IJ Shiley in place with catheter tip at the level of lower SVC.    --- End of Report ---          KEELY JOSE MD; Resident Radiologist  This document has been electronically signed.  KHALIDA JENSEN MD; Attending Interventional Radiologist  This document has been electronically signed. Aug 22 2023 12:29PM    < end of copied text >                                8.7    13.01 )-----------( 254      ( 29 Aug 2023 01:10 )             27.4                         6.9    14.41 )-----------( 280      ( 28 Aug 2023 05:25 )             21.2                         7.0    14.07 )-----------( 294      ( 28 Aug 2023 00:30 )             21.9                         7.2    13.06 )-----------( 321      ( 27 Aug 2023 04:03 )             21.7                         7.1    9.57  )-----------( 340      ( 26 Aug 2023 19:25 )             22.1                         7.4    10.34 )-----------( 341      ( 26 Aug 2023 02:15 )             23.2     08-29    134<L>  |  96<L>  |  45<H>  ----------------------------<  255<H>  3.5   |  24  |  1.98<H>  08-28    137  |  99  |  64<H>  ----------------------------<  227<H>  4.1   |  22  |  2.55<H>  08-27    137  |  100  |  60<H>  ----------------------------<  233<H>  3.8   |  24  |  2.28<H>  08-27    137  |  100  |  56<H>  ----------------------------<  209<H>  3.6   |  24  |  2.19<H>  08-26    136  |  99  |  98<H>  ----------------------------<  177<H>  4.6   |  19<L>  |  3.33<H>  08-26    136  |  100  |  96<H>  ----------------------------<  168<H>  4.5   |  18<L>  |  3.19<H>  08-25    134<L>  |  99  |  93<H>  ----------------------------<  155<H>  4.5   |  19<L>  |  3.08<H>    Ca    8.0<L>      29 Aug 2023 01:10  Ca    8.0<L>      28 Aug 2023 00:30  Ca    7.7<L>      27 Aug 2023 12:30  Phos  5.3     08-29  Phos  6.8     08-28  Mg     1.90     08-29  Mg     1.90     08-28    TPro  6.0  /  Alb  2.7<L>  /  TBili  0.3  /  DBili  x   /  AST  17  /  ALT  15  /  AlkPhos  113  08-29  TPro  5.5<L>  /  Alb  2.5<L>  /  TBili  0.2  /  DBili  x   /  AST  10  /  ALT  17  /  AlkPhos  121<H>  08-28  TPro  5.7<L>  /  Alb  2.5<L>  /  TBili  0.2  /  DBili  x   /  AST  16  /  ALT  21  /  AlkPhos  142<H>  08-27    CAPILLARY BLOOD GLUCOSE      POCT Blood Glucose.: 215 mg/dL (29 Aug 2023 05:48)  POCT Blood Glucose.: 255 mg/dL (29 Aug 2023 01:25)  POCT Blood Glucose.: 208 mg/dL (28 Aug 2023 17:56)  POCT Blood Glucose.: 156 mg/dL (28 Aug 2023 12:06)      LIVER FUNCTIONS - ( 29 Aug 2023 01:10 )  Alb: 2.7 g/dL / Pro: 6.0 g/dL / ALK PHOS: 113 U/L / ALT: 15 U/L / AST: 17 U/L / GGT: x           PT/INR - ( 29 Aug 2023 01:10 )   PT: 11.6 sec;   INR: 1.03 ratio         PTT - ( 29 Aug 2023 01:10 )  PTT:28.3 sec  Urinalysis Basic - ( 29 Aug 2023 01:10 )    Color: x / Appearance: x / SG: x / pH: x  Gluc: 255 mg/dL / Ketone: x  / Bili: x / Urobili: x   Blood: x / Protein: x / Nitrite: x   Leuk Esterase: x / RBC: x / WBC x   Sq Epi: x / Non Sq Epi: x / Bacteria: x            RECENT CULTURES:        RESPIRATORY CULTURES:          Studies  Chest X-RAY  CT SCAN Chest   Venous Dopplers: LE:   CT Abdomen  Others

## 2023-08-29 NOTE — PROGRESS NOTE ADULT - SUBJECTIVE AND OBJECTIVE BOX
INTERVAL HPI/OVERNIGHT EVENTS:    SUBJECTIVE: Patient seen and examined at bedside. Intubated, sedated, ROS cannot be obtained.       VITAL SIGNS:  ICU Vital Signs Last 24 Hrs  T(C): 36.6 (29 Aug 2023 00:00), Max: 37.1 (28 Aug 2023 08:00)  T(F): 97.8 (29 Aug 2023 00:00), Max: 98.7 (28 Aug 2023 08:00)  HR: 69 (29 Aug 2023 05:42) (65 - 76)  BP: --  BP(mean): --  ABP: 175/48 (29 Aug 2023 04:00) (144/31 - 188/47)  ABP(mean): 92 (29 Aug 2023 04:00) (73 - 97)  RR: 22 (29 Aug 2023 04:00) (14 - 26)  SpO2: 96% (29 Aug 2023 05:42) (92% - 100%)    O2 Parameters below as of 29 Aug 2023 04:00  Patient On (Oxygen Delivery Method): ventilator    O2 Concentration (%): 30      Mode: AC/ CMV (Assist Control/ Continuous Mandatory Ventilation), RR (machine): 12, TV (machine): 360, FiO2: 30, PEEP: 5, ITime: 0.72, MAP: 10, PIP: 36  Plateau pressure:   P/F ratio:     08-27 @ 07:01  -  08-28 @ 07:00  --------------------------------------------------------  IN: 870.7 mL / OUT: 450 mL / NET: 420.7 mL    08-28 @ 07:01  -  08-29 @ 06:44  --------------------------------------------------------  IN: 1870.5 mL / OUT: 4610 mL / NET: -2739.5 mL      CAPILLARY BLOOD GLUCOSE      POCT Blood Glucose.: 215 mg/dL (29 Aug 2023 05:48)    ECG:    PHYSICAL EXAMINATION:  General: Comfortable, no acute distress, cooperative with exam.  HEENT: PERRLA, EOMI, moist mucous membranes.  Respiratory: CTAB, normal respiratory effort, no coughing, wheezes, crackles, or rales.  CV: RRR, S1S2, no murmurs, rubs or gallops. No JVD. Distal pulses intact.  Abdominal: Soft, nontender, nondistended, no rebound or guarding, normal bowel sounds.  Neurology: AOx3, no focal neuro defects, CENTENO x 4.  Extremities: No pitting edema, + Peripheral pulses.  Incisions:   Tubes:    MEDICATIONS:  MEDICATIONS  (STANDING):  albuterol/ipratropium for Nebulization 3 milliLiter(s) Nebulizer every 8 hours  aspirin  chewable 81 milliGRAM(s) Oral daily  atorvastatin 40 milliGRAM(s) Oral at bedtime  buMETAnide Infusion 4 mG/Hr (20 mL/Hr) IV Continuous <Continuous>  chlorhexidine 0.12% Liquid 15 milliLiter(s) Oral Mucosa every 12 hours  chlorhexidine 2% Cloths 1 Application(s) Topical daily  cloNIDine 0.2 milliGRAM(s) Oral every 8 hours  dexMEDEtomidine Infusion 1 MICROgram(s)/kG/Hr (16.6 mL/Hr) IV Continuous <Continuous>  dextrose 5%. 1000 milliLiter(s) (50 mL/Hr) IV Continuous <Continuous>  dextrose 5%. 1000 milliLiter(s) (100 mL/Hr) IV Continuous <Continuous>  dextrose 50% Injectable 25 Gram(s) IV Push once  dextrose 50% Injectable 12.5 Gram(s) IV Push once  dextrose 50% Injectable 25 Gram(s) IV Push once  glucagon  Injectable 1 milliGRAM(s) IntraMuscular once  heparin   Injectable 5000 Unit(s) SubCutaneous every 8 hours  insulin lispro (ADMELOG) corrective regimen sliding scale   SubCutaneous every 6 hours  labetalol 600 milliGRAM(s) Oral three times a day  niCARdipine Infusion 2.5 mG/Hr (12.5 mL/Hr) IV Continuous <Continuous>  pantoprazole  Injectable 40 milliGRAM(s) IV Push two times a day  predniSONE   Tablet 40 milliGRAM(s) Oral daily  sevelamer carbonate Powder 1600 milliGRAM(s) Oral three times a day    MEDICATIONS  (PRN):  acetaminophen   Oral Liquid .. 650 milliGRAM(s) Oral every 6 hours PRN Temp greater or equal to 38C (100.4F), Mild Pain (1 - 3)  dextrose Oral Gel 15 Gram(s) Oral once PRN Blood Glucose LESS THAN 70 milliGRAM(s)/deciliter      ALLERGIES:  Allergies    isoniazid (Rash)  nafcillin (Unknown)  hydrALAZINE (Rash)  vitamin E (Short breath; Urticaria; Hives)  doxycycline (Rash)  cefepime (Rash)  NIFEdipine (Urticaria; Hives)    Intolerances        LABS:                        8.7    13.01 )-----------( 254      ( 29 Aug 2023 01:10 )             27.4     08-29    134<L>  |  96<L>  |  45<H>  ----------------------------<  255<H>  3.5   |  24  |  1.98<H>    Ca    8.0<L>      29 Aug 2023 01:10  Phos  5.3     08-29  Mg     1.90     08-29    TPro  6.0  /  Alb  2.7<L>  /  TBili  0.3  /  DBili  x   /  AST  17  /  ALT  15  /  AlkPhos  113  08-29    PT/INR - ( 29 Aug 2023 01:10 )   PT: 11.6 sec;   INR: 1.03 ratio         PTT - ( 29 Aug 2023 01:10 )  PTT:28.3 sec  Urinalysis Basic - ( 29 Aug 2023 01:10 )    Color: x / Appearance: x / SG: x / pH: x  Gluc: 255 mg/dL / Ketone: x  / Bili: x / Urobili: x   Blood: x / Protein: x / Nitrite: x   Leuk Esterase: x / RBC: x / WBC x   Sq Epi: x / Non Sq Epi: x / Bacteria: x        RADIOLOGY & ADDITIONAL TESTS: Reviewed.

## 2023-08-29 NOTE — PROGRESS NOTE ADULT - SUBJECTIVE AND OBJECTIVE BOX
Subjective: Patient seen and examined. No new events except as noted.   remains in ICU  Intubated,    REVIEW OF SYSTEMS:    Unable to obtain     MEDICATIONS:  MEDICATIONS  (STANDING):  albuterol/ipratropium for Nebulization 3 milliLiter(s) Nebulizer every 8 hours  aspirin  chewable 81 milliGRAM(s) Oral daily  atorvastatin 40 milliGRAM(s) Oral at bedtime  buMETAnide Infusion 4 mG/Hr (20 mL/Hr) IV Continuous <Continuous>  chlorhexidine 0.12% Liquid 15 milliLiter(s) Oral Mucosa every 12 hours  chlorhexidine 2% Cloths 1 Application(s) Topical daily  cloNIDine 0.2 milliGRAM(s) Oral every 8 hours  dexMEDEtomidine Infusion 1 MICROgram(s)/kG/Hr (16.6 mL/Hr) IV Continuous <Continuous>  dextrose 5%. 1000 milliLiter(s) (100 mL/Hr) IV Continuous <Continuous>  dextrose 5%. 1000 milliLiter(s) (50 mL/Hr) IV Continuous <Continuous>  dextrose 50% Injectable 25 Gram(s) IV Push once  dextrose 50% Injectable 12.5 Gram(s) IV Push once  dextrose 50% Injectable 25 Gram(s) IV Push once  glucagon  Injectable 1 milliGRAM(s) IntraMuscular once  heparin   Injectable 5000 Unit(s) SubCutaneous every 8 hours  insulin lispro (ADMELOG) corrective regimen sliding scale   SubCutaneous every 6 hours  labetalol 600 milliGRAM(s) Oral three times a day  niCARdipine Infusion 2.5 mG/Hr (12.5 mL/Hr) IV Continuous <Continuous>  pantoprazole  Injectable 40 milliGRAM(s) IV Push two times a day  predniSONE   Tablet 40 milliGRAM(s) Oral daily  sevelamer carbonate Powder 1600 milliGRAM(s) Oral three times a day      PHYSICAL EXAM:  T(C): 36.6 (08-29-23 @ 00:00), Max: 37.1 (08-28-23 @ 16:00)  HR: 65 (08-29-23 @ 07:20) (65 - 84)  BP: --  RR: 12 (08-29-23 @ 07:00) (12 - 26)  SpO2: 97% (08-29-23 @ 07:20) (92% - 100%)  Wt(kg): --  I&O's Summary    28 Aug 2023 07:01  -  29 Aug 2023 07:00  --------------------------------------------------------  IN: 2072 mL / OUT: 4710 mL / NET: -2638 mL              Appearance: NAD, intubated, arousable/resp[onsive  HEENT: dry oral mucosa, [facility] AMBROCIO   Lymphatic: No lymphadenopathy  Cardiovascular: Normal S1 S2, No JVD, No murmurs, No edema  Respiratory: Decreased BS, intubated on ventilator	  Psychiatry: A & O x 1  Gastrointestinal: Soft, Non-tender, + BS, + OGT	  Skin: No rashes, No ecchymoses, No cyanosis	  Extremities: No strength/ROM 2/2 sedation, + BL LE edema  Vascular: Peripheral pulses palpable 2+ bilaterally  +willard    Mode: AC/ CMV (Assist Control/ Continuous Mandatory Ventilation), RR (machine): 12, TV (machine): 360, FiO2: 30, PEEP: 5, ITime: 0.72, MAP: 10, PIP: 36  Plateau pressure:   P/F ratio:       LABS:    CARDIAC MARKERS:    Blood Gas Profile - Arterial (08.29.23 @ 01:10)   pH, Arterial: 7.28  pCO2, Arterial: 52 mmHg  pO2, Arterial: 86 mmHg  HCO3, Arterial: 24 mmol/L  Base Excess, Arterial: -2.5 mmol/L  Oxygen Saturation, Arterial: 98.3 %  Total CO2, Arterial: 26 mmol/L  FIO2, Arterial: 30Blood Gas Arterial, Lactate: 0.6 mmol/L (08.29.23 @ 01:10)                             8.7    13.01 )-----------( 254      ( 29 Aug 2023 01:10 )             27.4     08-29    134<L>  |  96<L>  |  45<H>  ----------------------------<  255<H>  3.5   |  24  |  1.98<H>    Ca    8.0<L>      29 Aug 2023 01:10  Phos  5.3     08-29  Mg     1.90     08-29    TPro  6.0  /  Alb  2.7<L>  /  TBili  0.3  /  DBili  x   /  AST  17  /  ALT  15  /  AlkPhos  113  08-29    proBNP:   Lipid Profile:   HgA1c:   TSH:             TELEMETRY: 	    ECG:  	  RADIOLOGY:   DIAGNOSTIC TESTING:  [ ] Echocardiogram:  [ ]  Catheterization:  [ ] Stress Test:    OTHER:

## 2023-08-29 NOTE — PROGRESS NOTE ADULT - ASSESSMENT
72 y/o F DM on insulin, HTN, CKD, CHFpEF, breast CA, respiratory arrest and cardiac arrest (2018), CVA with residual weakness, aspiration PNA h/o trache, PEG, both removed presents with respiratory distress requiring intubation. Found to have elevated BNP, may be 2/2 to volume overload i/s/o CKD vs CHF.     Neuro ;  - Sedated : Baseline MS AOx3   -WAS ON PROPOFOL AND FENTANYL BEFORE: NOW OFF:    - Monitor her mental status:  requiring only 30% fio2:    -8/15: - now she has been off sedation for more then 24 hours but is still lethargic: her o2 requirement has not increased:   -for possible extubation if she wakes up   :: -dw PMD: pt is still lethargic  for MRI brain stem today   ": -had ct head: OK: awaiting MRI brain : still lethargic  : seemed same to me: family at bedside who says she is little  bit more awake: oxygen requirement is same:  at 30%:off vasopressors   -now neuro note reviewed:  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 reason for inability ot trigger vent and co2 retention initially ? candidate for trach    -now the co2 i s normal : not much improvement in her mental statis:  off sedation for days   /: still lethargic: on precedex;  cxr reviewed:  last time at [facility] she was found to have cavity in RUL : she was ruled out for tb : her AFB was positive but was MAC /; defer to MICU   "  still sedated:  no sob:  on 30% fio2:  on precedex:  and is on nicardipine as well as labetalol ; for renal biopsy today  : seems OK: more alert and awake:  renal biopsy is still pending:  la n[place] family   : doing OK: alert and awake:  but still intubated:  getting cpap : no renal biopsy done   : notable to be weaned:  probably trach next week if she is not extubated by early next week : restarted on bumex  : doing same on cpap trials at this time:  cont iv diuresis per renal ; on predniosne for renal issues:   : seems OK: no sob:  no cugh : no phlegm : on cpap trial: ? extubate L vs trach    : she is hypercarbic acidotic today while being on cpap trials at 8  : the talk is ongoing for extubation trial or trach  : family seems  interested in reintubation in the event if she is extubated:   : pts family is still vacillating between trach or not/;  dw  in detail at bedside dw MICU ACP  CVA   - cont aspirin and statin   //-per neurology   : sedated  neuro followin/23:edation:  seems to be doing better:  plan for extubation if family refused for biopsy   : more awake and alert :   : stable  : she is awake but is lethargic  : she is little bit more alert today  :   : mentally she is doing same:  still lethargic but open eyes and respond to questions  : she seems lethargic today  : o n fuill vent support   Cardiac   -CHFpEF : TTE 2023 with EF 59%, with severe LVH and diastolic dysfunction : pro-BNP > 52699, trop 78   -on bumex drip :  -cards following   8/15: : she is off bumex drip and is on midodrine   : remains off bumex  :: off bumex:  defer to MICU team   cont to remain off diuretics   : she seems same to me: in renal failure off diuretics   : per Micu   : seems stable  : back  on bumex drip    : cont bumex drip    : diuretics per renal   : she is due for hd today  : dw renal : removing 3 L of fluid today  :   : on bumex drip : HD per renal   Pulmonary   -Acute hypercapnic and hypoxemic respiratory failure   -DDx: aspiration vs volume overload  VBG with respiratory acidosis pCO2 111  - CXR with small right pleural effusion, no consolidations/opacities  -now she seems better:  fio2: 30$:  -? etiology of hypercarbia:  multifactorial : seems to have OHS and TYRONE superimposed by acute chf  ? and aspiration pneumonia:"   -pt is mostly bed bound:   -it is possible that this time:  she mght need bipap on dc : atleast at the night time:   -on 8/15:  still remains intubated  :still lethargic:  not extubated hypercarbic acidotic today on abg:  weaning trials :probably would need bipap following extubation  : :still lethargic: :awaiting mri brain : dw    : :MRI done has new right cerebellar infarct on mri : defer to MICU   -seen by bora now:  has brain stem infarct to per neuro note:  reason for being vent;    : awaiting renal bipsy today  : and then aggressive weaning  : now no renal biopsy :    : still sightly hypoxic but good sao2:  cont to wean and extubate if needed  still has AMS   :? extubation vs trach   : being weaned: plan as above   : she is on full vent support:  weaning as tolerated:  but now seems heading towards trach   /Renal   -Hyperkalemia with EKG changes  : 7.2, hemolyzed, given insulin and D50 + calcium gluc   -K is better today : cont to monitor  -normal today on 8/15   8/16:stable  :-k has been ok for l ast few days  : stable  : started on prednisone yesterday for suspected AIN by renal    : wbc is high : likely secondary to steroids  she has no fveR:  ruled out for tb last time   : for renal biopsy today   : RENAL  BIOPSY NOT DONE:  FAMILY IS THINKING about itl   : biopsy not done: urine output increased:   : doing  ok : no sob:  no biopsy for now:   : per primary pulm tea in MICU   : on cpap trial: hypercarbic today  :   : on full vent suport: uniley that she will wean and remains extubated for long time:   ? trach    Endocrine  T2DM : A1C 7.1 in 2023   -cont to monitor blood glucose  ID   - No fever/leukocytosis  - Hx latent TB which was treated, no concern for TB  : She was recently ruled out for tuberculosis at [facility]   - Started on empiric vancomycin and aztreonam (cefepime/zosyn allergy? developed rash req prednisone)  : now dced:   defer to MICU   - f/u MRSA   - follow up blood culture/urine   :blood cultures negative so far: legionella is negative:  remains off antibiotics  staph aureus on sputum   :: off antibiotics at this time: secondary to drug eruption  : remains off antibiotics   -: off diuretics   : off antibiotics   : off antibiotics   : off antibiotics   : off antibtiocs  : remains afebrile:   : off antibtiocs    dw micu and  at bedside   overall prognosis seems guarded

## 2023-08-29 NOTE — PROGRESS NOTE ADULT - SUBJECTIVE AND OBJECTIVE BOX
Patient is a 73y old  Female who presents with a chief complaint of Respiratory distress (29 Aug 2023 10:38)      SUBJECTIVE / OVERNIGHT EVENTS: ptn is sedated, intubated on full vent support, s/p PUF today 2.5 Liters    MEDICATIONS  (STANDING):  albuterol/ipratropium for Nebulization 3 milliLiter(s) Nebulizer every 8 hours  aspirin  chewable 81 milliGRAM(s) Oral daily  atorvastatin 40 milliGRAM(s) Oral at bedtime  chlorhexidine 0.12% Liquid 15 milliLiter(s) Oral Mucosa every 12 hours  chlorhexidine 2% Cloths 1 Application(s) Topical daily  cloNIDine 0.2 milliGRAM(s) Oral every 8 hours  dexMEDEtomidine Infusion 1 MICROgram(s)/kG/Hr (16.6 mL/Hr) IV Continuous <Continuous>  dextrose 5%. 1000 milliLiter(s) (100 mL/Hr) IV Continuous <Continuous>  dextrose 5%. 1000 milliLiter(s) (50 mL/Hr) IV Continuous <Continuous>  dextrose 50% Injectable 25 Gram(s) IV Push once  dextrose 50% Injectable 12.5 Gram(s) IV Push once  dextrose 50% Injectable 25 Gram(s) IV Push once  doxazosin 6 milliGRAM(s) Oral <User Schedule>  glucagon  Injectable 1 milliGRAM(s) IntraMuscular once  insulin lispro (ADMELOG) corrective regimen sliding scale   SubCutaneous every 6 hours  labetalol 600 milliGRAM(s) Oral three times a day  niCARdipine Infusion 2.5 mG/Hr (12.5 mL/Hr) IV Continuous <Continuous>  pantoprazole  Injectable 40 milliGRAM(s) IV Push two times a day  predniSONE   Tablet 40 milliGRAM(s) Oral daily  sevelamer carbonate Powder 1600 milliGRAM(s) Oral three times a day    MEDICATIONS  (PRN):  acetaminophen   Oral Liquid .. 650 milliGRAM(s) Oral every 6 hours PRN Temp greater or equal to 38C (100.4F), Mild Pain (1 - 3)  dextrose Oral Gel 15 Gram(s) Oral once PRN Blood Glucose LESS THAN 70 milliGRAM(s)/deciliter      Vital Signs Last 24 Hrs  T(F): 99.1 (08-29-23 @ 17:10), Max: 99.6 (08-29-23 @ 16:00)  HR: 64 (08-29-23 @ 18:00) (62 - 84)  BP: --  RR: 12 (08-29-23 @ 18:00) (12 - 25)  SpO2: 95% (08-29-23 @ 18:00) (95% - 100%)  Telemetry:   CAPILLARY BLOOD GLUCOSE      POCT Blood Glucose.: 377 mg/dL (29 Aug 2023 18:26)  POCT Blood Glucose.: 322 mg/dL (29 Aug 2023 11:22)  POCT Blood Glucose.: 215 mg/dL (29 Aug 2023 05:48)  POCT Blood Glucose.: 255 mg/dL (29 Aug 2023 01:25)    I&O's Summary    28 Aug 2023 07:01  -  29 Aug 2023 07:00  --------------------------------------------------------  IN: 2072 mL / OUT: 4760 mL / NET: -2688 mL    29 Aug 2023 07:01  -  29 Aug 2023 19:01  --------------------------------------------------------  IN: 770.5 mL / OUT: 525 mL / NET: 245.5 mL        PHYSICAL EXAM:  GENERAL: NAD, well-developed  HEAD:  Atraumatic, Normocephalic  EYES: EOMI, PERRLA, conjunctiva and sclera clear  NECK: Supple, No JVD  CHEST/LUNG: Clear to auscultation bilaterally; No wheeze  HEART: Regular rate and rhythm; No murmurs, rubs, or gallops  ABDOMEN: Soft, Nontender, Nondistended; Bowel sounds present  EXTREMITIES:  2+ Peripheral Pulses, No clubbing, cyanosis, or edema  PSYCH: AAOx3  NEUROLOGY: non-focal  SKIN: No rashes or lesions    LABS:                        8.7    13.01 )-----------( 254      ( 29 Aug 2023 01:10 )             27.4     08-29    134<L>  |  96<L>  |  45<H>  ----------------------------<  255<H>  3.5   |  24  |  1.98<H>    Ca    8.0<L>      29 Aug 2023 01:10  Phos  5.3     08-29  Mg     1.90     08-29    TPro  6.0  /  Alb  2.7<L>  /  TBili  0.3  /  DBili  x   /  AST  17  /  ALT  15  /  AlkPhos  113  08-29    PT/INR - ( 29 Aug 2023 01:10 )   PT: 11.6 sec;   INR: 1.03 ratio         PTT - ( 29 Aug 2023 01:10 )  PTT:28.3 sec      Urinalysis Basic - ( 29 Aug 2023 01:10 )    Color: x / Appearance: x / SG: x / pH: x  Gluc: 255 mg/dL / Ketone: x  / Bili: x / Urobili: x   Blood: x / Protein: x / Nitrite: x   Leuk Esterase: x / RBC: x / WBC x   Sq Epi: x / Non Sq Epi: x / Bacteria: x        RADIOLOGY & ADDITIONAL TESTS:    Imaging Personally Reviewed:    Consultant(s) Notes Reviewed:      Care Discussed with Consultants/Other Providers:

## 2023-08-29 NOTE — PROGRESS NOTE ADULT - SUBJECTIVE AND OBJECTIVE BOX
Well-tolerated 3L UF with HD yesterday  Remains intubated/sedated on Precedex  Remains on Bumex gtt      VITAL:  T(C): , Max: 37.1 (08-28-23 @ 16:00)  T(F): , Max: 98.7 (08-28-23 @ 16:00)  HR: 62 (08-29-23 @ 09:00)  BP: 157/35  RR: 14 (08-29-23 @ 09:00)  SpO2: 98% (08-29-23 @ 09:00)  urine output 1060cc/24h    PHYSICAL EXAM:  Constitutional: intubated/mildly sedated but following simple commands  HEENT: (+)ETT/OGT  Neck: Supple, No JVD  Respiratory: coarse BS b/l  Cardiovascular: RRR s1s2, no m/r/g  Gastrointestinal: BS+, soft, NT/ND  : (+)willard  Extremities: No peripheral edema b/l  Back: no CVAT b/l  Skin: No rashes, no nevi  Access: [facility]    LABS:                        8.7    13.01 )-----------( 254      ( 29 Aug 2023 01:10 )             27.4     Na(134)/K(3.5)/Cl(96)/HCO3(24)/BUN(45)/Cr(1.98)Glu(255)/Ca(8.0)/Mg(1.90)/PO4(5.3)    08-29 @ 01:10  Na(137)/K(4.1)/Cl(99)/HCO3(22)/BUN(64)/Cr(2.55)Glu(227)/Ca(8.0)/Mg(1.90)/PO4(6.8)    08-28 @ 00:30  Na(137)/K(3.8)/Cl(100)/HCO3(24)/BUN(60)/Cr(2.28)Glu(233)/Ca(7.7)/Mg(--)/PO4(--)    08-27 @ 12:30  Na(137)/K(3.6)/Cl(100)/HCO3(24)/BUN(56)/Cr(2.19)Glu(209)/Ca(7.8)/Mg(2.00)/PO4(6.2)    08-27 @ 04:03  Na(136)/K(4.6)/Cl(99)/HCO3(19)/BUN(98)/Cr(3.33)Glu(177)/Ca(7.5)/Mg(2.20)/PO4(9.3)    08-26 @ 19:25      IMPRESSION: 73F w/ HTN, DM2, CVA, breast CA-bilateral mastectomy, recurrent aspiration pneumonia/respiratory failure, and CKD, 8/11/23 p/w acute hypercapnic respiratory failure; c/b RUSS    (1)CKD - stage 4     (2)RUSS - borderline oliguric AIN vs ATN - last dialyzed yesterday     (3)A/I - ?AIN - on empiric Prednisone 40mg qd - we can plan for 2 week course of 40mg; can start tapering as of 9/1/23.    (4)Hyperphosphatemia - renally mediated - PO4 high but acceptable for now, on Renvela powder    (5)Pulm- hypercapnic respiratory failure on admission - remains intubated    (6)CV - hypertension - reliant of late on high-dose Labetalol, PO Clonidine, Bumex, and ultrafiltration      RECOMMEND:  (1)Pure ultrafiltration (PUF) today - 2.5L as able  (2)Prednisone 40mg po qd through 9/1/23, then start tapering  (3)Renvela as ordered  (4)Dose new meds for GFR ~15ml/min  (5)Efforts at extubation per MICU          Jimmy Colon MD  [facility]  Office/on call physician: [phone]  Cell (7a-7p): [phone]       Well-tolerated 3L UF with HD yesterday  Remains intubated/sedated on Precedex  Remains on Bumex gtt   at bedside    VITAL:  T(C): , Max: 37.1 (08-28-23 @ 16:00)  T(F): , Max: 98.7 (08-28-23 @ 16:00)  HR: 62 (08-29-23 @ 09:00)  BP: 157/35  RR: 14 (08-29-23 @ 09:00)  SpO2: 98% (08-29-23 @ 09:00)  urine output 1060cc/24h    PHYSICAL EXAM:  Constitutional: intubated/mildly sedated but following simple commands  HEENT: (+)ETT/OGT  Neck: Supple, No JVD  Respiratory: coarse BS b/l  Cardiovascular: RRR s1s2, no m/r/g  Gastrointestinal: BS+, soft, NT/ND  : (+)willard  Extremities: No peripheral edema b/l  Back: no CVAT b/l  Skin: No rashes, no nevi  Access: [facility]    LABS:                        8.7    13.01 )-----------( 254      ( 29 Aug 2023 01:10 )             27.4     Na(134)/K(3.5)/Cl(96)/HCO3(24)/BUN(45)/Cr(1.98)Glu(255)/Ca(8.0)/Mg(1.90)/PO4(5.3)    08-29 @ 01:10  Na(137)/K(4.1)/Cl(99)/HCO3(22)/BUN(64)/Cr(2.55)Glu(227)/Ca(8.0)/Mg(1.90)/PO4(6.8)    08-28 @ 00:30  Na(137)/K(3.8)/Cl(100)/HCO3(24)/BUN(60)/Cr(2.28)Glu(233)/Ca(7.7)/Mg(--)/PO4(--)    08-27 @ 12:30  Na(137)/K(3.6)/Cl(100)/HCO3(24)/BUN(56)/Cr(2.19)Glu(209)/Ca(7.8)/Mg(2.00)/PO4(6.2)    08-27 @ 04:03  Na(136)/K(4.6)/Cl(99)/HCO3(19)/BUN(98)/Cr(3.33)Glu(177)/Ca(7.5)/Mg(2.20)/PO4(9.3)    08-26 @ 19:25      IMPRESSION: 73F w/ HTN, DM2, CVA, breast CA-bilateral mastectomy, recurrent aspiration pneumonia/respiratory failure, and CKD, 8/11/23 p/w acute hypercapnic respiratory failure; c/b RUSS    (1)CKD - stage 4     (2)RUSS - borderline oliguric AIN vs ATN - last dialyzed yesterday     (3)A/I - ?AIN - on empiric Prednisone 40mg qd - we can plan for 2 week course of 40mg; can start tapering as of 9/1/23.    (4)Hyperphosphatemia - renally mediated - PO4 high but acceptable for now, on Renvela powder    (5)Pulm- hypercapnic respiratory failure on admission - remains intubated    (6)CV - hypertension - reliant of late on high-dose Labetalol, PO Clonidine, Bumex, and ultrafiltration      RECOMMEND:  (1)Pure ultrafiltration (PUF) today - 2.5L as able  (2)Prednisone 40mg po qd through 9/1/23, then start tapering  (3)Renvela as ordered  (4)Dose new meds for GFR ~15ml/min  (5)Efforts at extubation per MICU    counseled /answered questions as able    Jimmy Colon MD  [facility]  Office/on call physician: [phone]  Cell (7a-7p): [phone]

## 2023-08-29 NOTE — PROGRESS NOTE ADULT - ASSESSMENT
72 y/o F well known to me from [facility]. she was admitted at [facility] 7/12-7/22 w aspiration PNA, was treated w CEFEPIME, developed an allergic rash,  dCHF, + MAC on AFB culture, had been progressively getting more and more lethargic and dyspneic at home since DC.   In  am of 8/11/23  ptn presented with respiratory distress w hypoxia and hypercarbia requiring intubation 2/2 volume overload +/- Asp PNA      Neuro   responds appropriately while intubated  Baseline MS AOx3, aphasic   - h/o CVA , on aspirin and statin . 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 probably embolic in nature, did not tolerate full AC in the past, STEPHANIE is neg , no shunts observed      Cardiac   Ptn well known to Dr. Dunn, consult reviewed   CHFpEF   TTE 7/2023 with EF 59%, with severe LVH and diastolic dysfunction   pro-BNP > 58747, trop 78   on cardine drip for bp control and labetalol po tid 600 mg, clonidine raised to 0.2 mg tid     Pulmonary   Acute hypercapnic and hypoxemic respiratory failure   well known to Dr. Mcnulty, consult reviewed   DDx: aspiration vs volume overload   VBG with respiratory acidosis pCO2 111  CT chest: mod ( worsening) R pl effusion, h/o RUL cavity, +MAC  - cefepime DCed on 8/13, started on Moxifloxacin on 8/13, since 8/14 off ABx  - intubated,  on precedex, , on full vent support, prob will need a trache    Renal   Hyperkalemia with EKG changes  , initially treated w LOKELMA,  now resolved   Ptn well know to Dr. Colon, consult reviewed      worsening renal function, oliguric, fluid overloaded, on Bumex drip, worsening azotemia, may need HD, renal following. BP elevated, on labetalol cardene, clonidine, on empiric steroids for AIN  through 9/1, start tapering thereafter   HD 8/19,  8/21, 8/26 and 8/28.  s/p PUF today 2.5 Liters  cont HD as per renal  on bumex  drip , but renal recommends to DC  renal biopsy has been deferred , ASA  resumed, AC on hold    GI  NPO for now, on tube feeds  coffee ground emesis x 1 8/24, now on IV PPI,     Endocrine  T2DM   A1C 7.1 in 7/2023   - BG goal 140-200     ID   No fever/leukocytosis, recheck temp   Hx latent TB which was treated, no concern for TB   - grew MAC on AFB culture from most recent admission. would call ID consult  Started on empiric vancomycin and aztreonam,  changed to cefepime 8/12, cefepime dced on 8/13(cefepime/zosyn allergy.  developed rash when on cefepime on previous admission ) . started on AVALOX on 8/13, off ABx on 8/14. ptn has an allergic rash, prob 2/2 cefepime  - f/u MRSA   - all cxs NTD    Heme/Onc  ppx: heparin q8 hrs     Ethics  GOC - Discussed GOC with daughter and , they have opted in the past for full code. and she remains full code at present

## 2023-08-29 NOTE — PROGRESS NOTE ADULT - ASSESSMENT
74 y/o F DM on insulin, HTN, CKD, CHFpEF, breast CA, respiratory arrest and cardiac arrest (2018), CVA with residual weakness, aspiration PNA h/o trache, PEG, both removed presents with respiratory distress requiring intubation. May be volume overload i/s/o CHF/CKD vs decrease respiratory drive (given oxygen at home). Improving mental status however worsening renal function and anuria, concerning for ATN vs AIN.     Neuro   #AMS  #Metabolic encephalopathy   #CVA   Baseline MS AOx3 with short term memory changes per family  - MRI brain 8/17 showed right cerebellar infarct (new) with old left PCA/occipital infarcts, likely embolic in nature - STEPHANIE with no TRINITY thrombus or intracardiac shunts  - Now with mild sedation with Precedex for ventilator synchrony  - Intermittent shaking noted, per family happens at baseline but more pronounced here; EEG without epileptiform activity, per neuro no need for LP  - Restarted aspirin 8/26    Cardiac   #Hypertension; SBP max 200s ? iso renal dysfunction/overlaod, now 130s-140s  - Labetalol 600 TID - will hold off on further increases to prevent bradycardia  - Clonidine increased to 0.2 TID (8/26)   - Will obtain med rec from family and see which medications can be restarted    #Shock  - Resolved     #CHFpEF   - TTE 7/2023 with EF 59%, with severe LVH and diastolic dysfunction   - pro-BNP > 03842, trop 78   - Repeat TTE with EF 60% normal systolic and grade 1 diastolic dysfunction     Pulmonary   #Acute hypercapnic and hypoxemic respiratory failure   - DDx: aspiration vs volume overload vs sedating medication decreasing drive (was given nyquil)   - Previously did not tolerate CPAP trials  - Will continue CPAP trial today, in preparation for extubation  - If fails SBT trials, may need trach    /Renal   #RUSS on CKD  - Ddx: obstructive (willard in, no hydro on POCUS) vs low flow state vs AIN i/s/o cephalosporin use and drug rash   - kidney biopsy to r/o AIN planned 8/22, however family felt procedure too risky and so deferred  - cont empiric prednisone 40mg started 8/18   - Continue to monitor, dose meds per eGFR. avoid nephrotoxic agents  - first HD session 8/19, most recent session 8/26 - will get another session 8/28  - Strict IOs per nephro; Willard in place for UOP monitoring  - c/w prednisone 40 for 14 days per nephro for AIN, then taper (8/18-     # Hyperphosphatemia  - Continue to monitor BMP q12  - Started on renvela powder TID per nephro recs    GI  - Had one episode of coffee ground emesis 8/24, hemoglobin stable  - GI consulted, no plan for scope  - Continue PPI IV BID iso recent CGE, will transition to PO post extubation  - Restarted tube feeds    Endocrine  #T2DM   - ISS  - NPH DC'd while NPO, will restart if BG increase now that tube feeds are started    ID   Started on empiric vancomycin and aztreonam (cefepime/zosyn allergy? developed rash req prednisone)   Trialed cefepime --> cefazolin (sputum MSSA), developed drug eruption - discontinued   Blood and urine cultures 8/11 demonstrating no growth currently, no clinical signs of infection  - Monitor off antibiotics    Heme/Onc  ppx: heparin q8 hrs   - Hgb 6.9, will transfuse 1 unit with HD    Ethics  GOC - Per previous GOC with daughter and , reported that they have not talked about end of life care with the patient. For now, they wanted "everything to be done" including CPR, continuing with mech ventilation/intubation, pressors

## 2023-08-30 NOTE — PROGRESS NOTE ADULT - SUBJECTIVE AND OBJECTIVE BOX
Date of Service: 08-30-23 @ 11:31    Patient is a 73y old  Female who presents with a chief complaint of Respiratory distress (30 Aug 2023 09:18)      Any change in ROS:     MEDICATIONS  (STANDING):  albuterol/ipratropium for Nebulization 3 milliLiter(s) Nebulizer every 8 hours  aspirin  chewable 81 milliGRAM(s) Oral daily  atorvastatin 40 milliGRAM(s) Oral at bedtime  chlorhexidine 0.12% Liquid 15 milliLiter(s) Oral Mucosa every 12 hours  chlorhexidine 2% Cloths 1 Application(s) Topical daily  cloNIDine 0.3 milliGRAM(s) Oral every 8 hours  dexMEDEtomidine Infusion 1 MICROgram(s)/kG/Hr (16.6 mL/Hr) IV Continuous <Continuous>  dextrose 5%. 1000 milliLiter(s) (100 mL/Hr) IV Continuous <Continuous>  dextrose 5%. 1000 milliLiter(s) (50 mL/Hr) IV Continuous <Continuous>  dextrose 50% Injectable 25 Gram(s) IV Push once  dextrose 50% Injectable 12.5 Gram(s) IV Push once  dextrose 50% Injectable 25 Gram(s) IV Push once  doxazosin 6 milliGRAM(s) Oral <User Schedule>  glucagon  Injectable 1 milliGRAM(s) IntraMuscular once  insulin lispro (ADMELOG) corrective regimen sliding scale   SubCutaneous every 6 hours  labetalol 600 milliGRAM(s) Oral three times a day  niCARdipine Infusion 2.5 mG/Hr (12.5 mL/Hr) IV Continuous <Continuous>  pantoprazole  Injectable 40 milliGRAM(s) IV Push two times a day  predniSONE   Tablet 40 milliGRAM(s) Oral daily  sevelamer carbonate Powder 1600 milliGRAM(s) Oral three times a day    MEDICATIONS  (PRN):  acetaminophen   Oral Liquid .. 650 milliGRAM(s) Oral every 6 hours PRN Temp greater or equal to 38C (100.4F), Mild Pain (1 - 3)  dextrose Oral Gel 15 Gram(s) Oral once PRN Blood Glucose LESS THAN 70 milliGRAM(s)/deciliter    Vital Signs Last 24 Hrs  T(C): 37.2 (30 Aug 2023 04:00), Max: 37.6 (29 Aug 2023 16:00)  T(F): 99 (30 Aug 2023 04:00), Max: 99.6 (29 Aug 2023 16:00)  HR: 59 (30 Aug 2023 11:02) (59 - 73)  BP: --  BP(mean): --  RR: 12 (30 Aug 2023 09:00) (12 - 25)  SpO2: 100% (30 Aug 2023 11:02) (95% - 100%)    Parameters below as of 30 Aug 2023 09:00  Patient On (Oxygen Delivery Method): ventilator    O2 Concentration (%): 30  Mode: AC/ CMV (Assist Control/ Continuous Mandatory Ventilation)  RR (machine): 12  TV (machine): 360  FiO2: 30  PEEP: 5  ITime: 0.74  MAP: 8  PIP: 29    I&O's Summary    29 Aug 2023 07:01  -  30 Aug 2023 07:00  --------------------------------------------------------  IN: 1904.5 mL / OUT: 3787 mL / NET: -1882.5 mL    30 Aug 2023 07:01  -  30 Aug 2023 11:31  --------------------------------------------------------  IN: 48 mL / OUT: 30 mL / NET: 18 mL          Physical Exam:   GENERAL: NAD, well-groomed, well-developed  HEENT: MANDY/   Atraumatic, Normocephalic  ENMT: No tonsillar erythema, exudates, or enlargement; Moist mucous membranes, Good dentition, No lesions  NECK: Supple, No JVD, Normal thyroid  CHEST/LUNG: Clear to auscultaion, ; No rales, rhonchi, wheezing, or rubs  CVS: Regular rate and rhythm; No murmurs, rubs, or gallops  GI: : Soft, Nontender, Nondistended; Bowel sounds present  NERVOUS SYSTEM:  Alert & Oriented X3, Good concentration; Motor Strength 5/5 B/L upper and lower extremities; DTRs 2+ intact and symmetric  EXTREMITIES:  2+ Peripheral Pulses, No clubbing, cyanosis, or edema  LYMPH: No lymphadenopathy noted  SKIN: No rashes or lesions  ENDOCRINOLOGY: No Thyromegaly  PSYCH: Appropriate    Labs:  ABG - ( 30 Aug 2023 00:05 )  pH, Arterial: 7.33  pH, Blood: x     /  pCO2: 45    /  pO2: 123   / HCO3: 24    / Base Excess: -2.2  /  SaO2: 98.4                                        8.0    11.27 )-----------( 229      ( 30 Aug 2023 00:05 )             24.6                         8.7    13.01 )-----------( 254      ( 29 Aug 2023 01:10 )             27.4                         6.9    14.41 )-----------( 280      ( 28 Aug 2023 05:25 )             21.2                         7.0    14.07 )-----------( 294      ( 28 Aug 2023 00:30 )             21.9                         7.2    13.06 )-----------( 321      ( 27 Aug 2023 04:03 )             21.7                         7.1    9.57  )-----------( 340      ( 26 Aug 2023 19:25 )             22.1     08-30    135  |  97<L>  |  57<H>  ----------------------------<  402<H>  3.3<L>   |  23  |  2.29<H>  08-29    134<L>  |  96<L>  |  45<H>  ----------------------------<  255<H>  3.5   |  24  |  1.98<H>  08-28    137  |  99  |  64<H>  ----------------------------<  227<H>  4.1   |  22  |  2.55<H>  08-27    137  |  100  |  60<H>  ----------------------------<  233<H>  3.8   |  24  |  2.28<H>  08-27    137  |  100  |  56<H>  ----------------------------<  209<H>  3.6   |  24  |  2.19<H>  08-26    136  |  99  |  98<H>  ----------------------------<  177<H>  4.6   |  19<L>  |  3.33<H>    Ca    8.2<L>      30 Aug 2023 00:05  Ca    8.0<L>      29 Aug 2023 01:10  Phos  4.1     08-30  Phos  5.3     08-29  Mg     1.80     08-30  Mg     1.90     08-29    TPro  5.6<L>  /  Alb  2.5<L>  /  TBili  0.2  /  DBili  x   /  AST  9   /  ALT  10  /  AlkPhos  123<H>  08-30  TPro  6.0  /  Alb  2.7<L>  /  TBili  0.3  /  DBili  x   /  AST  17  /  ALT  15  /  AlkPhos  113  08-29  TPro  5.5<L>  /  Alb  2.5<L>  /  TBili  0.2  /  DBili  x   /  AST  10  /  ALT  17  /  AlkPhos  121<H>  08-28  TPro  5.7<L>  /  Alb  2.5<L>  /  TBili  0.2  /  DBili  x   /  AST  16  /  ALT  21  /  AlkPhos  142<H>  08-27    CAPILLARY BLOOD GLUCOSE      POCT Blood Glucose.: 261 mg/dL (30 Aug 2023 05:52)  POCT Blood Glucose.: 408 mg/dL (30 Aug 2023 00:15)  POCT Blood Glucose.: 377 mg/dL (29 Aug 2023 18:26)      LIVER FUNCTIONS - ( 30 Aug 2023 00:05 )  Alb: 2.5 g/dL / Pro: 5.6 g/dL / ALK PHOS: 123 U/L / ALT: 10 U/L / AST: 9 U/L / GGT: x           PT/INR - ( 30 Aug 2023 00:05 )   PT: 11.4 sec;   INR: 1.02 ratio         PTT - ( 29 Aug 2023 01:10 )  PTT:28.3 sec  Urinalysis Basic - ( 30 Aug 2023 00:05 )    Color: x / Appearance: x / SG: x / pH: x  Gluc: 402 mg/dL / Ketone: x  / Bili: x / Urobili: x   Blood: x / Protein: x / Nitrite: x   Leuk Esterase: x / RBC: x / WBC x   Sq Epi: x / Non Sq Epi: x / Bacteria: x            RECENT CULTURES:        RESPIRATORY CULTURES:          Studies  Chest X-RAY  CT SCAN Chest   Venous Dopplers: LE:   CT Abdomen  Others               Date of Service: 08-30-23 @ 11:31    Patient is a 73y old  Female who presents with a chief complaint of Respiratory distress (30 Aug 2023 09:18)      Any change in ROS: basically doing same:  intubated    MEDICATIONS  (STANDING):  albuterol/ipratropium for Nebulization 3 milliLiter(s) Nebulizer every 8 hours  aspirin  chewable 81 milliGRAM(s) Oral daily  atorvastatin 40 milliGRAM(s) Oral at bedtime  chlorhexidine 0.12% Liquid 15 milliLiter(s) Oral Mucosa every 12 hours  chlorhexidine 2% Cloths 1 Application(s) Topical daily  cloNIDine 0.3 milliGRAM(s) Oral every 8 hours  dexMEDEtomidine Infusion 1 MICROgram(s)/kG/Hr (16.6 mL/Hr) IV Continuous <Continuous>  dextrose 5%. 1000 milliLiter(s) (100 mL/Hr) IV Continuous <Continuous>  dextrose 5%. 1000 milliLiter(s) (50 mL/Hr) IV Continuous <Continuous>  dextrose 50% Injectable 25 Gram(s) IV Push once  dextrose 50% Injectable 12.5 Gram(s) IV Push once  dextrose 50% Injectable 25 Gram(s) IV Push once  doxazosin 6 milliGRAM(s) Oral <User Schedule>  glucagon  Injectable 1 milliGRAM(s) IntraMuscular once  insulin lispro (ADMELOG) corrective regimen sliding scale   SubCutaneous every 6 hours  labetalol 600 milliGRAM(s) Oral three times a day  niCARdipine Infusion 2.5 mG/Hr (12.5 mL/Hr) IV Continuous <Continuous>  pantoprazole  Injectable 40 milliGRAM(s) IV Push two times a day  predniSONE   Tablet 40 milliGRAM(s) Oral daily  sevelamer carbonate Powder 1600 milliGRAM(s) Oral three times a day    MEDICATIONS  (PRN):  acetaminophen   Oral Liquid .. 650 milliGRAM(s) Oral every 6 hours PRN Temp greater or equal to 38C (100.4F), Mild Pain (1 - 3)  dextrose Oral Gel 15 Gram(s) Oral once PRN Blood Glucose LESS THAN 70 milliGRAM(s)/deciliter    Vital Signs Last 24 Hrs  T(C): 37.2 (30 Aug 2023 04:00), Max: 37.6 (29 Aug 2023 16:00)  T(F): 99 (30 Aug 2023 04:00), Max: 99.6 (29 Aug 2023 16:00)  HR: 59 (30 Aug 2023 11:02) (59 - 73)  BP: --  BP(mean): --  RR: 12 (30 Aug 2023 09:00) (12 - 25)  SpO2: 100% (30 Aug 2023 11:02) (95% - 100%)    Parameters below as of 30 Aug 2023 09:00  Patient On (Oxygen Delivery Method): ventilator    O2 Concentration (%): 30  Mode: AC/ CMV (Assist Control/ Continuous Mandatory Ventilation)  RR (machine): 12  TV (machine): 360  FiO2: 30  PEEP: 5  ITime: 0.74  MAP: 8  PIP: 29    I&O's Summary    29 Aug 2023 07:01  -  30 Aug 2023 07:00  --------------------------------------------------------  IN: 1904.5 mL / OUT: 3787 mL / NET: -1882.5 mL    30 Aug 2023 07:01  -  30 Aug 2023 11:31  --------------------------------------------------------  IN: 48 mL / OUT: 30 mL / NET: 18 mL          Physical Exam:   GENERAL: NAD, well-groomed, well-developed  HEENT: MANDY/   Atraumatic, Normocephalic  ENMT: No tonsillar erythema, exudates, or enlargement; Moist mucous membranes, Good dentition, No lesions  NECK: Supple, No JVD, Normal thyroid  CHEST/LUNG: Clear to auscultaion, ; No rales, rhonchi, wheezing, or rubs  CVS: Regular rate and rhythm; No murmurs, rubs, or gallops  GI: : Soft, Nontender, Nondistended; Bowel sounds present  NERVOUS SYSTEM:  letahrgic:"  intubated  EXTREMITIES: +edema  LYMPH: No lymphadenopathy noted  SKIN: No rashes or lesions  ENDOCRINOLOGY: No Thyromegaly  PSYCH: sedated    Labs:  ABG - ( 30 Aug 2023 00:05 )  pH, Arterial: 7.33  pH, Blood: x     /  pCO2: 45    /  pO2: 123   / HCO3: 24    / Base Excess: -2.2  /  SaO2: 98.4                                        8.0    11.27 )-----------( 229      ( 30 Aug 2023 00:05 )             24.6                         8.7    13.01 )-----------( 254      ( 29 Aug 2023 01:10 )             27.4                         6.9    14.41 )-----------( 280      ( 28 Aug 2023 05:25 )             21.2                         7.0    14.07 )-----------( 294      ( 28 Aug 2023 00:30 )             21.9                         7.2    13.06 )-----------( 321      ( 27 Aug 2023 04:03 )             21.7                         7.1    9.57  )-----------( 340      ( 26 Aug 2023 19:25 )             22.1     08-30    135  |  97<L>  |  57<H>  ----------------------------<  402<H>  3.3<L>   |  23  |  2.29<H>  08-29    134<L>  |  96<L>  |  45<H>  ----------------------------<  255<H>  3.5   |  24  |  1.98<H>  08-28    137  |  99  |  64<H>  ----------------------------<  227<H>  4.1   |  22  |  2.55<H>  08-27    137  |  100  |  60<H>  ----------------------------<  233<H>  3.8   |  24  |  2.28<H>  08-27    137  |  100  |  56<H>  ----------------------------<  209<H>  3.6   |  24  |  2.19<H>  08-26    136  |  99  |  98<H>  ----------------------------<  177<H>  4.6   |  19<L>  |  3.33<H>    Ca    8.2<L>      30 Aug 2023 00:05  Ca    8.0<L>      29 Aug 2023 01:10  Phos  4.1     08-30  Phos  5.3     08-29  Mg     1.80     08-30  Mg     1.90     08-29    TPro  5.6<L>  /  Alb  2.5<L>  /  TBili  0.2  /  DBili  x   /  AST  9   /  ALT  10  /  AlkPhos  123<H>  08-30  TPro  6.0  /  Alb  2.7<L>  /  TBili  0.3  /  DBili  x   /  AST  17  /  ALT  15  /  AlkPhos  113  08-29  TPro  5.5<L>  /  Alb  2.5<L>  /  TBili  0.2  /  DBili  x   /  AST  10  /  ALT  17  /  AlkPhos  121<H>  08-28  TPro  5.7<L>  /  Alb  2.5<L>  /  TBili  0.2  /  DBili  x   /  AST  16  /  ALT  21  /  AlkPhos  142<H>  08-27    CAPILLARY BLOOD GLUCOSE      POCT Blood Glucose.: 261 mg/dL (30 Aug 2023 05:52)  POCT Blood Glucose.: 408 mg/dL (30 Aug 2023 00:15)  POCT Blood Glucose.: 377 mg/dL (29 Aug 2023 18:26)      LIVER FUNCTIONS - ( 30 Aug 2023 00:05 )  Alb: 2.5 g/dL / Pro: 5.6 g/dL / ALK PHOS: 123 U/L / ALT: 10 U/L / AST: 9 U/L / GGT: x           PT/INR - ( 30 Aug 2023 00:05 )   PT: 11.4 sec;   INR: 1.02 ratio         PTT - ( 29 Aug 2023 01:10 )  PTT:28.3 sec  Urinalysis Basic - ( 30 Aug 2023 00:05 )    Color: x / Appearance: x / SG: x / pH: x  Gluc: 402 mg/dL / Ketone: x  / Bili: x / Urobili: x   Blood: x / Protein: x / Nitrite: x   Leuk Esterase: x / RBC: x / WBC x   Sq Epi: x / Non Sq Epi: x / Bacteria: x            RECENT CULTURES:        RESPIRATORY CULTURES:          Studies  Chest X-RAY  CT SCAN Chest   Venous Dopplers: LE:   CT Abdomen  Others          < from: Xray Chest 1 View- PORTABLE-Urgent (Xray Chest 1 View- PORTABLE-Urgent .) (08.24.23 @ 18:23) >    PROCEDURE DATE:  08/24/2023          INTERPRETATION:  CLINICAL INFORMATION: New onset coffee-ground emesis.    TECHNIQUE: One view of the chest.    COMPARISON: Radiograph chest 8/19/2023.    FINDINGS:  Extremely limited evaluation secondary to patient positioning.    Enteric tube is present.    IMPRESSION:  On this limited study, an enteric tube is present.    --- End of Report ---          CHANCE BLACKBURN MD; Resident Radiologist  This document has been electronically signed.  JERRI FUNK DO; Attending Radiologist  This document has been electronically signed. Aug 25 2023 10:26AM    < end of copied text >

## 2023-08-30 NOTE — PROGRESS NOTE ADULT - SUBJECTIVE AND OBJECTIVE BOX
[facility] [phone]        Patient seen and examined remains intubated and sedated currently on HD. She had PUF yesterday 2.5L removed. Plans for trach tomorrow noted.         MEDICATIONS  (STANDING):  albuterol/ipratropium for Nebulization 3 milliLiter(s) Nebulizer every 8 hours  aspirin  chewable 81 milliGRAM(s) Oral daily  atorvastatin 40 milliGRAM(s) Oral at bedtime  chlorhexidine 0.12% Liquid 15 milliLiter(s) Oral Mucosa every 12 hours  chlorhexidine 2% Cloths 1 Application(s) Topical daily  cloNIDine 0.3 milliGRAM(s) Oral every 8 hours  dexMEDEtomidine Infusion 1 MICROgram(s)/kG/Hr (16.6 mL/Hr) IV Continuous <Continuous>  dextrose 5%. 1000 milliLiter(s) (100 mL/Hr) IV Continuous <Continuous>  dextrose 5%. 1000 milliLiter(s) (50 mL/Hr) IV Continuous <Continuous>  dextrose 50% Injectable 25 Gram(s) IV Push once  dextrose 50% Injectable 12.5 Gram(s) IV Push once  dextrose 50% Injectable 25 Gram(s) IV Push once  doxazosin 6 milliGRAM(s) Oral <User Schedule>  glucagon  Injectable 1 milliGRAM(s) IntraMuscular once  insulin lispro (ADMELOG) corrective regimen sliding scale   SubCutaneous every 6 hours  labetalol 600 milliGRAM(s) Oral three times a day  niCARdipine Infusion 2.5 mG/Hr (12.5 mL/Hr) IV Continuous <Continuous>  pantoprazole  Injectable 40 milliGRAM(s) IV Push two times a day  predniSONE   Tablet 40 milliGRAM(s) Oral daily  sevelamer carbonate Powder 1600 milliGRAM(s) Oral three times a day      VITAL:  T(C): , Max: 37.6 (08-29-23 @ 16:00)  T(F): , Max: 99.6 (08-29-23 @ 16:00)  HR: 69 (08-30-23 @ 12:00)  BP: --  BP(mean): --  RR: 12 (08-30-23 @ 12:00)  SpO2: 100% (08-30-23 @ 12:00)  Wt(kg): --    I and O's:    08-29 @ 07:01  -  08-30 @ 07:00  --------------------------------------------------------  IN: 1904.5 mL / OUT: 3787 mL / NET: -1882.5 mL    08-30 @ 07:01  -  08-30 @ 13:15  --------------------------------------------------------  IN: 88 mL / OUT: 90 mL / NET: -2 mL          PHYSICAL EXAM:    Constitutional: sedated  HEENT: intubated +OGT  Respiratory: coarse BS  Cardiovascular: S1 and S2  Gastrointestinal: BS+, soft, NT/ND  Extremities: + peripheral edema  Neurological: UTO  : + Wheeler  Skin: No rashes  Access: [facility] HD catheter (8/19) (accessed)    LABS:                        8.0    11.27 )-----------( 229      ( 30 Aug 2023 00:05 )             24.6     08-30    135  |  97<L>  |  57<H>  ----------------------------<  402<H>  3.3<L>   |  23  |  2.29<H>    Ca    8.2<L>      30 Aug 2023 00:05  Phos  4.1     08-30  Mg     1.80     08-30    TPro  5.6<L>  /  Alb  2.5<L>  /  TBili  0.2  /  DBili  x   /  AST  9   /  ALT  10  /  AlkPhos  123<H>  08-30          Urine Studies:  Urinalysis Basic - ( 30 Aug 2023 00:05 )    Color: x / Appearance: x / SG: x / pH: x  Gluc: 402 mg/dL / Ketone: x  / Bili: x / Urobili: x   Blood: x / Protein: x / Nitrite: x   Leuk Esterase: x / RBC: x / WBC x   Sq Epi: x / Non Sq Epi: x / Bacteria: x        IMPRESSION: 73F w/ HTN, DM2, CVA, breast CA-bilateral mastectomy, recurrent aspiration pneumonia/respiratory failure, and CKD, 8/11/23 p/w acute hypercapnic respiratory failure; c/b RUSS    (1)CKD - stage 4     (2)RUSS - borderline oliguric AIN vs ATN - PUF yesterday, HD today     (3)A/I - ?AIN - on empiric Prednisone 40mg qd - we can plan for 2 week course of 40mg; can start tapering as of 9/1/23.    (4)Hyperphosphatemia - renally mediated - PO4 high but acceptable for now, on Renvela powder    (5)Pulm- hypercapnic respiratory failure on admission - remains intubated    (6)CV - hypertension - reliant of late on high-dose Labetalol, Clonidine, ultrafiltration and now Doxazosin, bumex d/c yesterday     (7)Hypokalemia- likely nutritional     RECOMMEND:  (1)Dialysis now 3 kg UF as able   (2)Prednisone 40mg po qd through 9/1/23, then start tapering  (3)A/w KCl 10 mEq IV x 3  (4)Renvela as ordered   (5)Dose new meds for GFR ~15ml/min  (6)Trach tomorrow      D/w  at bedside     Tere Arauz NP  [facility]  Office: [phone]               [facility] [phone]        Patient seen and examined remains intubated and sedated currently on HD. She had PUF yesterday 2.5L removed. Plans for trach tomorrow noted.         MEDICATIONS  (STANDING):  albuterol/ipratropium for Nebulization 3 milliLiter(s) Nebulizer every 8 hours  aspirin  chewable 81 milliGRAM(s) Oral daily  atorvastatin 40 milliGRAM(s) Oral at bedtime  chlorhexidine 0.12% Liquid 15 milliLiter(s) Oral Mucosa every 12 hours  chlorhexidine 2% Cloths 1 Application(s) Topical daily  cloNIDine 0.3 milliGRAM(s) Oral every 8 hours  dexMEDEtomidine Infusion 1 MICROgram(s)/kG/Hr (16.6 mL/Hr) IV Continuous <Continuous>  dextrose 5%. 1000 milliLiter(s) (100 mL/Hr) IV Continuous <Continuous>  dextrose 5%. 1000 milliLiter(s) (50 mL/Hr) IV Continuous <Continuous>  dextrose 50% Injectable 25 Gram(s) IV Push once  dextrose 50% Injectable 12.5 Gram(s) IV Push once  dextrose 50% Injectable 25 Gram(s) IV Push once  doxazosin 6 milliGRAM(s) Oral <User Schedule>  glucagon  Injectable 1 milliGRAM(s) IntraMuscular once  insulin lispro (ADMELOG) corrective regimen sliding scale   SubCutaneous every 6 hours  labetalol 600 milliGRAM(s) Oral three times a day  niCARdipine Infusion 2.5 mG/Hr (12.5 mL/Hr) IV Continuous <Continuous>  pantoprazole  Injectable 40 milliGRAM(s) IV Push two times a day  predniSONE   Tablet 40 milliGRAM(s) Oral daily  sevelamer carbonate Powder 1600 milliGRAM(s) Oral three times a day      VITAL:  T(C): , Max: 37.6 (08-29-23 @ 16:00)  T(F): , Max: 99.6 (08-29-23 @ 16:00)  HR: 69 (08-30-23 @ 12:00)  BP: --  BP(mean): --  RR: 12 (08-30-23 @ 12:00)  SpO2: 100% (08-30-23 @ 12:00)  Wt(kg): --    I and O's:    08-29 @ 07:01  -  08-30 @ 07:00  --------------------------------------------------------  IN: 1904.5 mL / OUT: 3787 mL / NET: -1882.5 mL    08-30 @ 07:01  -  08-30 @ 13:15  --------------------------------------------------------  IN: 88 mL / OUT: 90 mL / NET: -2 mL          PHYSICAL EXAM:    Constitutional: sedated  HEENT: intubated +OGT  Respiratory: coarse BS  Cardiovascular: S1 and S2  Gastrointestinal: BS+, soft, NT/ND  Extremities: + peripheral edema  Neurological: UTO  : + Wheeler  Skin: No rashes  Access: [facility] HD catheter (8/19) (accessed)    LABS:                        8.0    11.27 )-----------( 229      ( 30 Aug 2023 00:05 )             24.6     08-30    135  |  97<L>  |  57<H>  ----------------------------<  402<H>  3.3<L>   |  23  |  2.29<H>    Ca    8.2<L>      30 Aug 2023 00:05  Phos  4.1     08-30  Mg     1.80     08-30    TPro  5.6<L>  /  Alb  2.5<L>  /  TBili  0.2  /  DBili  x   /  AST  9   /  ALT  10  /  AlkPhos  123<H>  08-30          Urine Studies:  Urinalysis Basic - ( 30 Aug 2023 00:05 )    Color: x / Appearance: x / SG: x / pH: x  Gluc: 402 mg/dL / Ketone: x  / Bili: x / Urobili: x   Blood: x / Protein: x / Nitrite: x   Leuk Esterase: x / RBC: x / WBC x   Sq Epi: x / Non Sq Epi: x / Bacteria: x        IMPRESSION: 73F w/ HTN, DM2, CVA, breast CA-bilateral mastectomy, recurrent aspiration pneumonia/respiratory failure, and CKD, 8/11/23 p/w acute hypercapnic respiratory failure; c/b RUSS    (1)CKD - stage 4     (2)RUSS - borderline oliguric AIN vs ATN - PUF yesterday, HD today     (3)A/I - ?AIN - on empiric Prednisone 40mg qd - we can plan for 2 week course of 40mg; can start tapering as of 9/1/23.    (4)Hyperphosphatemia - renally mediated - PO4 high but acceptable for now, on Renvela powder    (5)Pulm- hypercapnic respiratory failure on admission - remains intubated    (6)CV - hypertension - reliant of late on high-dose Labetalol, Clonidine, ultrafiltration and now Doxazosin, bumex d/c yesterday     (7)Hypokalemia- likely nutritional     RECOMMEND:  (1)Dialysis now 3 kg UF as able   (2)Prednisone 40mg po qd through 9/1/23, then start tapering  (3)A/w KCl 10 mEq IV x 3  (4)Renvela as ordered   (5)Dose new meds for GFR ~15ml/min  (6)Trach tomorrow      D/w  at bedside     Tere Arauz NP  [facility]  Office: [phone]        RENAL ATTENDING NOTE  Patient seen and examined with NP. Agree with assessment and plan as above.  Goal UF reduced from 3L to 1.5L in setting of intradialytic hypotension      Jimmy Colon MD  [facility]  [phone]

## 2023-08-30 NOTE — PROGRESS NOTE ADULT - SUBJECTIVE AND OBJECTIVE BOX
Patient is a 73y old  Female who presents with a chief complaint of Respiratory distress (29 Aug 2023 19:01)      SUBJECTIVE / OVERNIGHT EVENTS:    MEDICATIONS  (STANDING):  albuterol/ipratropium for Nebulization 3 milliLiter(s) Nebulizer every 8 hours  aspirin  chewable 81 milliGRAM(s) Oral daily  atorvastatin 40 milliGRAM(s) Oral at bedtime  chlorhexidine 0.12% Liquid 15 milliLiter(s) Oral Mucosa every 12 hours  chlorhexidine 2% Cloths 1 Application(s) Topical daily  cloNIDine 0.3 milliGRAM(s) Oral every 8 hours  dexMEDEtomidine Infusion 1 MICROgram(s)/kG/Hr (16.6 mL/Hr) IV Continuous <Continuous>  dextrose 5%. 1000 milliLiter(s) (100 mL/Hr) IV Continuous <Continuous>  dextrose 5%. 1000 milliLiter(s) (50 mL/Hr) IV Continuous <Continuous>  dextrose 50% Injectable 25 Gram(s) IV Push once  dextrose 50% Injectable 12.5 Gram(s) IV Push once  dextrose 50% Injectable 25 Gram(s) IV Push once  doxazosin 6 milliGRAM(s) Oral <User Schedule>  glucagon  Injectable 1 milliGRAM(s) IntraMuscular once  insulin lispro (ADMELOG) corrective regimen sliding scale   SubCutaneous every 6 hours  labetalol 600 milliGRAM(s) Oral three times a day  niCARdipine Infusion 2.5 mG/Hr (12.5 mL/Hr) IV Continuous <Continuous>  pantoprazole  Injectable 40 milliGRAM(s) IV Push two times a day  predniSONE   Tablet 40 milliGRAM(s) Oral daily  sevelamer carbonate Powder 1600 milliGRAM(s) Oral three times a day    MEDICATIONS  (PRN):  acetaminophen   Oral Liquid .. 650 milliGRAM(s) Oral every 6 hours PRN Temp greater or equal to 38C (100.4F), Mild Pain (1 - 3)  dextrose Oral Gel 15 Gram(s) Oral once PRN Blood Glucose LESS THAN 70 milliGRAM(s)/deciliter      CAPILLARY BLOOD GLUCOSE      POCT Blood Glucose.: 261 mg/dL (30 Aug 2023 05:52)  POCT Blood Glucose.: 408 mg/dL (30 Aug 2023 00:15)  POCT Blood Glucose.: 377 mg/dL (29 Aug 2023 18:26)  POCT Blood Glucose.: 322 mg/dL (29 Aug 2023 11:22)    I&O's Summary    29 Aug 2023 07:01  -  30 Aug 2023 07:00  --------------------------------------------------------  IN: 1896.5 mL / OUT: 3757 mL / NET: -1860.5 mL        Vital Signs Last 24 Hrs  T(C): 37.2 (30 Aug 2023 04:00), Max: 37.6 (29 Aug 2023 16:00)  T(F): 99 (30 Aug 2023 04:00), Max: 99.6 (29 Aug 2023 16:00)  HR: 62 (30 Aug 2023 07:13) (61 - 73)  BP: --  BP(mean): --  RR: 12 (30 Aug 2023 07:00) (12 - 25)  SpO2: 100% (30 Aug 2023 07:13) (95% - 100%)    Parameters below as of 30 Aug 2023 07:13  Patient On (Oxygen Delivery Method): conventional ventilator        PHYSICAL EXAM:  GENERAL: NAD, well-developed, well-nourished  HEAD: Atraumatic, Normocephalic  EYES: EOMI, PERRLA, conjunctiva and sclera clear  NECK: Supple, No JVD  CHEST/LUNG: Clear to auscultation bilaterally; No wheezes or crackles  HEART: Normal S1/S2; Regular rate and rhythm; No murmurs, rubs, or gallops  ABDOMEN: Soft, Nontender, Nondistended; Bowel sounds present  EXTREMITIES: 2+ Peripheral Pulses; No clubbing, cyanosis, or edema  PSYCH: A&Ox3  NEUROLOGY: no focal neurologic deficit  SKIN: No rashes or lesions    LABS:                        8.0    11.27 )-----------( 229      ( 30 Aug 2023 00:05 )             24.6      08-30    135  |  97<L>  |  57<H>  ----------------------------<  402<H>  3.3<L>   |  23  |  2.29<H>    Ca    8.2<L>      30 Aug 2023 00:05  Phos  4.1     08-30  Mg     1.80     08-30    TPro  5.6<L>  /  Alb  2.5<L>  /  TBili  0.2  /  DBili  x   /  AST  9   /  ALT  10  /  AlkPhos  123<H>  08-30    PT/INR - ( 30 Aug 2023 00:05 )   PT: 11.4 sec;   INR: 1.02 ratio         PTT - ( 29 Aug 2023 01:10 )  PTT:28.3 sec      Urinalysis Basic - ( 30 Aug 2023 00:05 )    Color: x / Appearance: x / SG: x / pH: x  Gluc: 402 mg/dL / Ketone: x  / Bili: x / Urobili: x   Blood: x / Protein: x / Nitrite: x   Leuk Esterase: x / RBC: x / WBC x   Sq Epi: x / Non Sq Epi: x / Bacteria: x        RADIOLOGY & ADDITIONAL TESTS:    Imaging Personally Reviewed:    Consultant(s) Notes Reviewed:      Care Discussed with Consultants/Other Providers:   *******************************  Martha Douglas PGY-2  *******************************    INTERVAL HPI/OVERNIGHT EVENTS: SBP up to 170, clonidine increased to 0.3 TID, was able to be weaned from cardene    SUBJECTIVE: Patient seen and examined at bedside, remains sedated and intubated, responsive to verbal stimuli    OBJECTIVE:    VITAL SIGNS:  ICU Vital Signs Last 24 Hrs  T(C): 37.2 (30 Aug 2023 04:00), Max: 37.6 (29 Aug 2023 16:00)  T(F): 99 (30 Aug 2023 04:00), Max: 99.6 (29 Aug 2023 16:00)  HR: 59 (30 Aug 2023 11:02) (59 - 73)  BP: --  BP(mean): --  ABP: 159/31 (30 Aug 2023 09:00) (100/18 - 177/38)  ABP(mean): 73 (30 Aug 2023 09:00) (47 - 86)  RR: 12 (30 Aug 2023 09:00) (12 - 25)  SpO2: 100% (30 Aug 2023 11:02) (95% - 100%)    O2 Parameters below as of 30 Aug 2023 09:00  Patient On (Oxygen Delivery Method): ventilator    O2 Concentration (%): 30      Mode: AC/ CMV (Assist Control/ Continuous Mandatory Ventilation), RR (machine): 12, TV (machine): 360, FiO2: 30, PEEP: 5, ITime: 0.74, MAP: 8, PIP: 29    08-29 @ 07:01  -  08-30 @ 07:00  --------------------------------------------------------  IN: 1904.5 mL / OUT: 3787 mL / NET: -1882.5 mL    08-30 @ 07:01 - 08-30 @ 11:08  --------------------------------------------------------  IN: 48 mL / OUT: 30 mL / NET: 18 mL      CAPILLARY BLOOD GLUCOSE      POCT Blood Glucose.: 261 mg/dL (30 Aug 2023 05:52)        PHYSICAL EXAM:  General: Comfortable, no acute distress, cooperative with exam. Sedated/intubated  HEENT: PERRLA, EOMI, moist mucous membranes.  Respiratory: CTAB, mechanical breath sounds, no coughing, wheezes, crackles, or rales.  CV: RRR, S1S2, no murmurs, rubs or gallops. No JVD. Distal pulses intact.  Abdominal: Soft, nontender, nondistended, no rebound or guarding, normal bowel sounds.  Neurology: Sedated/intubated, responsive to verbal stimuli, no focal neuro defects, CENTENO x 4.  Extremities: +Pitting edema bilaterally, improved  Incisions:   Tubes:        MEDICATIONS:  MEDICATIONS  (STANDING):  albuterol/ipratropium for Nebulization 3 milliLiter(s) Nebulizer every 8 hours  aspirin  chewable 81 milliGRAM(s) Oral daily  atorvastatin 40 milliGRAM(s) Oral at bedtime  chlorhexidine 0.12% Liquid 15 milliLiter(s) Oral Mucosa every 12 hours  chlorhexidine 2% Cloths 1 Application(s) Topical daily  cloNIDine 0.3 milliGRAM(s) Oral every 8 hours  dexMEDEtomidine Infusion 1 MICROgram(s)/kG/Hr (16.6 mL/Hr) IV Continuous <Continuous>  dextrose 5%. 1000 milliLiter(s) (100 mL/Hr) IV Continuous <Continuous>  dextrose 5%. 1000 milliLiter(s) (50 mL/Hr) IV Continuous <Continuous>  dextrose 50% Injectable 25 Gram(s) IV Push once  dextrose 50% Injectable 12.5 Gram(s) IV Push once  dextrose 50% Injectable 25 Gram(s) IV Push once  doxazosin 6 milliGRAM(s) Oral <User Schedule>  glucagon  Injectable 1 milliGRAM(s) IntraMuscular once  insulin lispro (ADMELOG) corrective regimen sliding scale   SubCutaneous every 6 hours  labetalol 600 milliGRAM(s) Oral three times a day  niCARdipine Infusion 2.5 mG/Hr (12.5 mL/Hr) IV Continuous <Continuous>  pantoprazole  Injectable 40 milliGRAM(s) IV Push two times a day  predniSONE   Tablet 40 milliGRAM(s) Oral daily  sevelamer carbonate Powder 1600 milliGRAM(s) Oral three times a day    MEDICATIONS  (PRN):  acetaminophen   Oral Liquid .. 650 milliGRAM(s) Oral every 6 hours PRN Temp greater or equal to 38C (100.4F), Mild Pain (1 - 3)  dextrose Oral Gel 15 Gram(s) Oral once PRN Blood Glucose LESS THAN 70 milliGRAM(s)/deciliter      ALLERGIES:  Allergies    isoniazid (Rash)  nafcillin (Unknown)  hydrALAZINE (Rash)  vitamin E (Short breath; Urticaria; Hives)  doxycycline (Rash)  cefepime (Rash)  NIFEdipine (Urticaria; Hives)    Intolerances        LABS:                        8.0    11.27 )-----------( 229      ( 30 Aug 2023 00:05 )             24.6     08-30    135  |  97<L>  |  57<H>  ----------------------------<  402<H>  3.3<L>   |  23  |  2.29<H>    Ca    8.2<L>      30 Aug 2023 00:05  Phos  4.1     08-30  Mg     1.80     08-30    TPro  5.6<L>  /  Alb  2.5<L>  /  TBili  0.2  /  DBili  x   /  AST  9   /  ALT  10  /  AlkPhos  123<H>  08-30    PT/INR - ( 30 Aug 2023 00:05 )   PT: 11.4 sec;   INR: 1.02 ratio         PTT - ( 29 Aug 2023 01:10 )  PTT:28.3 sec  Urinalysis Basic - ( 30 Aug 2023 00:05 )    Color: x / Appearance: x / SG: x / pH: x  Gluc: 402 mg/dL / Ketone: x  / Bili: x / Urobili: x   Blood: x / Protein: x / Nitrite: x   Leuk Esterase: x / RBC: x / WBC x   Sq Epi: x / Non Sq Epi: x / Bacteria: x        RADIOLOGY & ADDITIONAL TESTS: Reviewed.

## 2023-08-30 NOTE — PROGRESS NOTE ADULT - ASSESSMENT
74 y/o F DM on insulin, HTN, CKD, CHFpEF, breast CA, respiratory arrest and cardiac arrest (2018), CVA with residual weakness, aspiration PNA h/o trache, PEG, both removed presents with respiratory distress requiring intubation. May be volume overload i/s/o CHF/CKD vs decrease respiratory drive (given oxygen at home). Improving mental status however worsening renal function and anuria, concerning for ATN vs AIN.     Neuro   #AMS  #Metabolic encephalopathy   #CVA   Baseline MS AOx3 with short term memory changes per family  - MRI brain 8/17 showed right cerebellar infarct (new) with old left PCA/occipital infarcts, likely embolic in nature - STEPHANIE with no TRINITY thrombus or intracardiac shunts  - Now with mild sedation with Precedex for ventilator synchrony  - Intermittent shaking noted, per family happens at baseline but more pronounced here; EEG without epileptiform activity, per neuro no need for LP  - Restarted aspirin 8/26    Cardiac   #Hypertension; SBP max 200s ? iso renal dysfunction/overlaod, now 130s-140s  - Labetalol 600 TID - will hold off on further increases to prevent bradycardia  - Clonidine increased to 0.2 TID (8/26)   - Will obtain med rec from family and see which medications can be restarted    #Shock  - Resolved     #CHFpEF   - TTE 7/2023 with EF 59%, with severe LVH and diastolic dysfunction   - pro-BNP > 19196, trop 78   - Repeat TTE with EF 60% normal systolic and grade 1 diastolic dysfunction     Pulmonary   #Acute hypercapnic and hypoxemic respiratory failure   - DDx: aspiration vs volume overload vs sedating medication decreasing drive (was given nyquil)   - Previously did not tolerate CPAP trials  - Will continue CPAP trial today, in preparation for extubation  - If fails SBT trials, may need trach    /Renal   #RUSS on CKD  - Ddx: obstructive (willard in, no hydro on POCUS) vs low flow state vs AIN i/s/o cephalosporin use and drug rash   - kidney biopsy to r/o AIN planned 8/22, however family felt procedure too risky and so deferred  - cont empiric prednisone 40mg started 8/18   - Continue to monitor, dose meds per eGFR. avoid nephrotoxic agents  - first HD session 8/19, most recent session 8/26 - will get another session 8/28  - Strict IOs per nephro; Willard in place for UOP monitoring  - c/w prednisone 40 for 14 days per nephro for AIN, then taper (8/18-     # Hyperphosphatemia  - Continue to monitor BMP q12  - Started on renvela powder TID per nephro recs    GI  - Had one episode of coffee ground emesis 8/24, hemoglobin stable  - GI consulted, no plan for scope  - Continue PPI IV BID iso recent CGE, will transition to PO post extubation  - Restarted tube feeds    Endocrine  #T2DM   - ISS  - NPH DC'd while NPO, will restart if BG increase now that tube feeds are started    ID   Started on empiric vancomycin and aztreonam (cefepime/zosyn allergy? developed rash req prednisone)   Trialed cefepime --> cefazolin (sputum MSSA), developed drug eruption - discontinued   Blood and urine cultures 8/11 demonstrating no growth currently, no clinical signs of infection  - Monitor off antibiotics    Heme/Onc  ppx: heparin q8 hrs   - Hgb 6.9, will transfuse 1 unit with HD    Ethics  GOC - Per previous GOC with daughter and , reported that they have not talked about end of life care with the patient. For now, they wanted "everything to be done" including CPR, continuing with mech ventilation/intubation, pressors   72 y/o F DM on insulin, HTN, CKD, CHFpEF, breast CA, respiratory arrest and cardiac arrest (2018), CVA with residual weakness, aspiration PNA h/o trache, PEG, both removed presents with respiratory distress requiring intubation. May be volume overload i/s/o CHF/CKD vs decrease respiratory drive (given oxygen at home). Improving mental status however worsening renal function and anuria, concerning for ATN vs AIN.     Neuro   #AMS  #Metabolic encephalopathy   #CVA   Baseline MS AOx3 with short term memory changes per family  - MRI brain 8/17 showed right cerebellar infarct (new) with old left PCA/occipital infarcts, likely embolic in nature - STEPHANIE with no TRINITY thrombus or intracardiac shunts  - Now with mild sedation with Precedex for ventilator synchrony  - Intermittent shaking noted, per family happens at baseline but more pronounced here; EEG without epileptiform activity, per neuro no need for LP  - Restarted aspirin 8/26    Cardiac   #Hypertension; SBP max 200s ? iso renal dysfunction/overlaod, now 130s-140s  - Labetalol 600 TID - will hold off on further increases to prevent bradycardia  - Clonidine increased to 0.2 TID (8/26)   - Will obtain med rec from family and see which medications can be restarted    #Shock  - Resolved     #CHFpEF   - TTE 7/2023 with EF 59%, with severe LVH and diastolic dysfunction   - pro-BNP > 51511, trop 78   - Repeat TTE with EF 60% normal systolic and grade 1 diastolic dysfunction     Pulmonary   #Acute hypercapnic and hypoxemic respiratory failure   - DDx: aspiration vs volume overload vs sedating medication decreasing drive (was given nyquil)   - Has not tolerated multiple CPAP trials       /Renal   #RUSS on CKD  - Ddx: obstructive (willard in, no hydro on POCUS) vs low flow state vs AIN i/s/o cephalosporin use and drug rash   - kidney biopsy to r/o AIN planned 8/22, however family felt procedure too risky and so deferred  - cont empiric prednisone 40mg started 8/18   - Continue to monitor, dose meds per eGFR. avoid nephrotoxic agents  - first HD session 8/19, most recent session 8/26 - will get another session 8/28  - Strict IOs per nephro; Willard in place for UOP monitoring  - c/w prednisone 40 for 14 days per nephro for AIN, then taper (8/18-     # Hyperphosphatemia  - Continue to monitor BMP q12  - Started on renvela powder TID per nephro recs    GI  - Had one episode of coffee ground emesis 8/24, hemoglobin stable  - GI consulted, no plan for scope  - Continue PPI IV BID iso recent CGE, will transition to PO post extubation  - Restarted tube feeds    Endocrine  #T2DM   - ISS  - NPH DC'd while NPO, will restart if BG increase now that tube feeds are started    ID   Started on empiric vancomycin and aztreonam (cefepime/zosyn allergy? developed rash req prednisone)   Trialed cefepime --> cefazolin (sputum MSSA), developed drug eruption - discontinued   Blood and urine cultures 8/11 demonstrating no growth currently, no clinical signs of infection  - Monitor off antibiotics    Heme/Onc  ppx: heparin q8 hrs   - Hgb 6.9, will transfuse 1 unit with HD    Ethics  GOC - Per previous GOC with daughter and , reported that they have not talked about end of life care with the patient. For now, they wanted "everything to be done" including CPR, continuing with mech ventilation/intubation, pressors

## 2023-08-30 NOTE — PROGRESS NOTE ADULT - SUBJECTIVE AND OBJECTIVE BOX
Subjective: Patient seen and examined. No new events except as noted.   remains intubated/sedated in ICU       REVIEW OF SYSTEMS:    Unable to obtain        MEDICATIONS:  MEDICATIONS  (STANDING):  albuterol/ipratropium for Nebulization 3 milliLiter(s) Nebulizer every 8 hours  aspirin  chewable 81 milliGRAM(s) Oral daily  atorvastatin 40 milliGRAM(s) Oral at bedtime  chlorhexidine 0.12% Liquid 15 milliLiter(s) Oral Mucosa every 12 hours  chlorhexidine 2% Cloths 1 Application(s) Topical daily  cloNIDine 0.3 milliGRAM(s) Oral every 8 hours  dexMEDEtomidine Infusion 1 MICROgram(s)/kG/Hr (16.6 mL/Hr) IV Continuous <Continuous>  dextrose 5%. 1000 milliLiter(s) (100 mL/Hr) IV Continuous <Continuous>  dextrose 5%. 1000 milliLiter(s) (50 mL/Hr) IV Continuous <Continuous>  dextrose 50% Injectable 25 Gram(s) IV Push once  dextrose 50% Injectable 12.5 Gram(s) IV Push once  dextrose 50% Injectable 25 Gram(s) IV Push once  doxazosin 6 milliGRAM(s) Oral <User Schedule>  glucagon  Injectable 1 milliGRAM(s) IntraMuscular once  insulin lispro (ADMELOG) corrective regimen sliding scale   SubCutaneous every 6 hours  labetalol 600 milliGRAM(s) Oral three times a day  niCARdipine Infusion 2.5 mG/Hr (12.5 mL/Hr) IV Continuous <Continuous>  pantoprazole  Injectable 40 milliGRAM(s) IV Push two times a day  predniSONE   Tablet 40 milliGRAM(s) Oral daily  sevelamer carbonate Powder 1600 milliGRAM(s) Oral three times a day      PHYSICAL EXAM:  T(C): 37.2 (08-30-23 @ 04:00), Max: 37.6 (08-29-23 @ 16:00)  HR: 62 (08-30-23 @ 08:00) (61 - 73)  BP: --  RR: 22 (08-30-23 @ 08:00) (12 - 25)  SpO2: 100% (08-30-23 @ 08:00) (95% - 100%)  Wt(kg): --  I&O's Summary    29 Aug 2023 07:01  -  30 Aug 2023 07:00  --------------------------------------------------------  IN: 1904.5 mL / OUT: 3787 mL / NET: -1882.5 mL    30 Aug 2023 07:01  -  30 Aug 2023 09:18  --------------------------------------------------------  IN: 48 mL / OUT: 30 mL / NET: 18 mL              Appearance: NAD, intubated  HEENT: dry oral mucosa, [facility] AMBROCIO   Lymphatic: No lymphadenopathy  Cardiovascular: Normal S1 S2, No JVD, No murmurs, No edema  Respiratory: Decreased BS, intubated on ventilator	  Psychiatry: A & O   Gastrointestinal: Soft, Non-tender, + BS, + OGT	  Skin: No rashes, No ecchymoses, No cyanosis	  Extremities: No strength/ROM 2/2 sedation, + BL LE edema  Vascular: Peripheral pulses palpable 2+ bilaterally  +willard    Mode: AC/ CMV (Assist Control/ Continuous Mandatory Ventilation), RR (machine): 12, TV (machine): 360, FiO2: 30, PEEP: 5, ITime: 0.72, MAP: 10, PIP: 36  Plateau pressure:   P/F ratio:       LABS:    CARDIAC MARKERS:      Blood Gas Profile - Arterial (08.30.23 @ 00:05)   pH, Arterial: 7.33  pCO2, Arterial: 45 mmHg  pO2, Arterial: 123 mmHg  HCO3, Arterial: 24 mmol/L  Base Excess, Arterial: -2.2 mmol/L  Oxygen Saturation, Arterial: 98.4 %  Total CO2, Arterial: 25 mmol/L  FIO2, Arterial: 30  Blood Gas Source Arterial: Arterial    Blood Gas Arterial, Lactate: 0.6 mmol/L (08.30.23 @ 00:05)                           8.0    11.27 )-----------( 229      ( 30 Aug 2023 00:05 )             24.6     08-30    135  |  97<L>  |  57<H>  ----------------------------<  402<H>  3.3<L>   |  23  |  2.29<H>    Ca    8.2<L>      30 Aug 2023 00:05  Phos  4.1     08-30  Mg     1.80     08-30    TPro  5.6<L>  /  Alb  2.5<L>  /  TBili  0.2  /  DBili  x   /  AST  9   /  ALT  10  /  AlkPhos  123<H>  08-30    proBNP:   Lipid Profile:   HgA1c:   TSH:             TELEMETRY: 	    ECG:  	  RADIOLOGY:   DIAGNOSTIC TESTING:  [ ] Echocardiogram:  [ ]  Catheterization:  [ ] Stress Test:    OTHER:

## 2023-08-30 NOTE — PROGRESS NOTE ADULT - ASSESSMENT
72 y/o F DM on insulin, HTN, CKD, CHFpEF, breast CA, respiratory arrest and cardiac arrest (2018), CVA with residual weakness, aspiration PNA h/o trache, PEG, both removed presents with respiratory distress requiring intubation. Found to have elevated BNP, may be 2/2 to volume overload i/s/o CKD vs CHF.     Neuro ;  - Sedated : Baseline MS AOx3   -WAS ON PROPOFOL AND FENTANYL BEFORE: NOW OFF:    - Monitor her mental status:  requiring only 30% fio2:    -8/15: - now she has been off sedation for more then 24 hours but is still lethargic: her o2 requirement has not increased:   -for possible extubation if she wakes up   :: -dw PMD: pt is still lethargic  for MRI brain stem today   ": -had ct head: OK: awaiting MRI brain : still lethargic  : seemed same to me: family at bedside who says she is little  bit more awake: oxygen requirement is same:  at 30%:off vasopressors   -now neuro note reviewed:  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 reason for inability ot trigger vent and co2 retention initially ? candidate for trach    -now the co2 i s normal : not much improvement in her mental statis:  off sedation for days   /: still lethargic: on precedex;  cxr reviewed:  last time at [facility] she was found to have cavity in RUL : she was ruled out for tb : her AFB was positive but was MAC /; defer to MICU   "  still sedated:  no sob:  on 30% fio2:  on precedex:  and is on nicardipine as well as labetalol ; for renal biopsy today  : seems OK: more alert and awake:  renal biopsy is still pending:  la nena family   : doing OK: alert and awake:  but still intubated:  getting cpap : no renal biopsy done   : notable to be weaned:  probably trach next week if she is not extubated by early next week : restarted on bumex  : doing same on cpap trials at this time:  cont iv diuresis per renal ; on predniosne for renal issues:   : seems OK: no sob:  no cugh : no phlegm : on cpap trial: ? extubate L vs trach    : she is hypercarbic acidotic today while being on cpap trials at 8  : the talk is ongoing for extubation trial or trach  : family seems  interested in reintubation in the event if she is extubated:   : pts family is still vacillating between trach or not/;  dw  in detail at bedside dw MICU ACP  : no finally no extubation  pt will go for trach in am : dw family   CVA   - cont aspirin and statin   //-per neurology   : sedated  neuro followin/23:edation:  seems to be doing better:  plan for extubation if family refused for biopsy   : more awake and alert :   : stable  : she is awake but is lethargic  : she is little bit more alert today  :   : mentally she is doing same:  still lethargic but open eyes and respond to questions  : she seems lethargic today  : o n fuill vent support   : sedated on full vent support  Cardiac   -CHFpEF : TTE 2023 with EF 59%, with severe LVH and diastolic dysfunction : pro-BNP > 71092, trop 78   -on bumex drip :  -cards following   8/15: : she is off bumex drip and is on midodrine   : remains off bumex  :: off bumex:  defer to MICU team   cont to remain off diuretics   : she seems same to me: in renal failure off diuretics   : per Micu   : seems stable  : back  on bumex drip    : cont bumex drip    : diuretics per renal   : she is due for hd today  : dw renal : removing 3 L of fluid today  :   : on bumex drip : HD per renal   : per re nal    Pulmonary   -Acute hypercapnic and hypoxemic respiratory failure   -DDx: aspiration vs volume overload  VBG with respiratory acidosis pCO2 111  - CXR with small right pleural effusion, no consolidations/opacities  -now she seems better:  fio2: 30$:  -? etiology of hypercarbia:  multifactorial : seems to have OHS and TYRONE superimposed by acute chf  ? and aspiration pneumonia:"   -pt is mostly bed bound:   -it is possible that this time:  she mght need bipap on dc : atleast at the night time:   -on 8/15:  still remains intubated  :still lethargic:  not extubated hypercarbic acidotic today on abg:  weaning trials :probably would need bipap following extubation  : :still lethargic: :awaiting mri brain : dw    : :MRI done has new right cerebellar infarct on mri : defer to MICU   -seen by bora now:  has brain stem infarct to per neuro note:  reason for being vent;    : awaiting renal bipsy today  : and then aggressive weaning  : now no renal biopsy :    : still sightly hypoxic but good sao2:  cont to wean and extubate if needed  still has AMS   :? extubation vs trach   : being weaned: plan as above   : she is on full vent support:  weaning as tolerated:  but now seems heading towards trach   : now for trach ellen m    /Renal   -Hyperkalemia with EKG changes  : 7.2, hemolyzed, given insulin and D50 + calcium gluc   -K is better today : cont to monitor  -normal today on 8/15   8/16:stable  :-k has been ok for l ast few days  : stable  : started on prednisone yesterday for suspected AIN by renal    : wbc is high : likely secondary to steroids  she has no fveR:  ruled out for tb last time   : for renal biopsy today   : RENAL  BIOPSY NOT DONE:  FAMILY IS THINKING about itl   : biopsy not done: urine output increased:   : doing  ok : no sob:  no biopsy for now:   : per primary pulm tea in MICU   : on cpap trial: hypercarbic today  :   : on full vent suport: uniley that she will wean and remains extubated for long time:   ? trach    : HD pewr renal   Endocrine  T2DM : A1C 7.1 in 2023   -cont to monitor blood glucose  ID   - No fever/leukocytosis  - Hx latent TB which was treated, no concern for TB  : She was recently ruled out for tuberculosis at [facility]   - Started on empiric vancomycin and aztreonam (cefepime/zosyn allergy? developed rash req prednisone)  : now dced:   defer to MICU   - f/u MRSA   - follow up blood culture/urine   :blood cultures negative so far: legionella is negative:  remains off antibiotics  staph aureus on sputum   :: off antibiotics at this time: secondary to drug eruption  : remains off antibiotics   -: off diuretics   : off antibiotics   : off antibiotics   : off antibiotics   : off antibtiocs  : remains afebrile:   : off antibtiocs    dw micu and  at bedside   overall prognosis seems guarded

## 2023-08-31 NOTE — PROGRESS NOTE ADULT - SUBJECTIVE AND OBJECTIVE BOX
Subjective: Patient seen and examined. No new events except as noted.   remains in ICU   GI bleed   plan for EGD today       REVIEW OF SYSTEMS:    Unable to obtain      MEDICATIONS:  MEDICATIONS  (STANDING):  albuterol/ipratropium for Nebulization 3 milliLiter(s) Nebulizer every 8 hours  atorvastatin 40 milliGRAM(s) Oral at bedtime  chlorhexidine 0.12% Liquid 15 milliLiter(s) Oral Mucosa every 12 hours  chlorhexidine 2% Cloths 1 Application(s) Topical daily  cloNIDine 0.3 milliGRAM(s) Oral every 8 hours  dexMEDEtomidine Infusion 1 MICROgram(s)/kG/Hr (16.6 mL/Hr) IV Continuous <Continuous>  dextrose 5%. 1000 milliLiter(s) (50 mL/Hr) IV Continuous <Continuous>  dextrose 5%. 1000 milliLiter(s) (100 mL/Hr) IV Continuous <Continuous>  dextrose 50% Injectable 12.5 Gram(s) IV Push once  dextrose 50% Injectable 25 Gram(s) IV Push once  dextrose 50% Injectable 25 Gram(s) IV Push once  doxazosin 6 milliGRAM(s) Oral <User Schedule>  fentaNYL    Injectable 200 MICROGram(s) IV Push once  glucagon  Injectable 1 milliGRAM(s) IntraMuscular once  insulin lispro (ADMELOG) corrective regimen sliding scale   SubCutaneous every 6 hours  insulin NPH human recombinant 5 Unit(s) SubCutaneous every 6 hours  labetalol 600 milliGRAM(s) Oral three times a day  midazolam Injectable 6 milliGRAM(s) IV Push once  niCARdipine Infusion 2.5 mG/Hr (12.5 mL/Hr) IV Continuous <Continuous>  norepinephrine Infusion 0.05 MICROgram(s)/kG/Min (3.12 mL/Hr) IV Continuous <Continuous>  pantoprazole  Injectable 40 milliGRAM(s) IV Push two times a day  predniSONE   Tablet 40 milliGRAM(s) Oral daily  sevelamer carbonate Powder 1600 milliGRAM(s) Oral three times a day      PHYSICAL EXAM:  T(C): 36.4 (08-31-23 @ 08:00), Max: 37.9 (08-30-23 @ 20:00)  HR: 60 (08-31-23 @ 10:58) (54 - 83)  BP: 74/11 (08-30-23 @ 15:40) (74/11 - 74/11)  RR: 12 (08-31-23 @ 10:58) (12 - 18)  SpO2: 100% (08-31-23 @ 10:58) (99% - 100%)  Wt(kg): --  I&O's Summary    30 Aug 2023 07:01  -  31 Aug 2023 07:00  --------------------------------------------------------  IN: 1649 mL / OUT: 1740 mL / NET: -91 mL    31 Aug 2023 07:01  -  31 Aug 2023 11:02  --------------------------------------------------------  IN: 16.6 mL / OUT: 70 mL / NET: -53.4 mL            Appearance: NAD, intubated  HEENT: dry oral mucosa, LIJ AMBROCIO   Lymphatic: No lymphadenopathy  Cardiovascular: Normal S1 S2, No JVD, No murmurs, No edema  Respiratory: Decreased BS, intubated on ventilator	  Psychiatry: A & O   Gastrointestinal: Soft, Non-tender, + BS, + OGT	  Skin: No rashes, No ecchymoses, No cyanosis	  Extremities: No strength/ROM 2/2 sedation, + BL LE edema  Vascular: Peripheral pulses palpable 2+ bilaterally  +willard      LABS:    CARDIAC MARKERS:    Blood Gas Profile - Arterial (08.31.23 @ 00:15)   pH, Arterial: 7.41  pCO2, Arterial: 40 mmHg  pO2, Arterial: 241 mmHg  HCO3, Arterial: 25 mmol/L  Base Excess, Arterial: 0.7 mmol/L  Oxygen Saturation, Arterial: 98.8 %  Total CO2, Arterial: 27 mmol/L  FIO2, Arterial: 30Blood Gas Arterial - Calcium, Ionized: 1.28 mmol/L (08.31.23 @ 00:15)                             7.5    8.96  )-----------( 158      ( 31 Aug 2023 06:00 )             22.5     08-31    135  |  98  |  42<H>  ----------------------------<  348<H>  3.9   |  24  |  1.75<H>    Ca    8.0<L>      31 Aug 2023 00:15  Phos  2.4     08-31  Mg     1.70     08-31    TPro  5.0<L>  /  Alb  2.2<L>  /  TBili  0.2  /  DBili  x   /  AST  12  /  ALT  11  /  AlkPhos  94  08-31    proBNP:   Lipid Profile:   HgA1c:   TSH:             TELEMETRY: 	SR    ECG:  	  RADIOLOGY:   DIAGNOSTIC TESTING:  [ ] Echocardiogram:  [ ]  Catheterization:  [ ] Stress Test:    OTHER:

## 2023-08-31 NOTE — PROGRESS NOTE ADULT - SUBJECTIVE AND OBJECTIVE BOX
Interval Events:   Patient remains intubated in the ICU.  Melena reported overnight however family at bedside reports red blood in stool.      ROS:   Unable to fully obtain ROS.    Hospital Medications:  acetaminophen   Oral Liquid .. 650 milliGRAM(s) Oral every 6 hours PRN  albuterol/ipratropium for Nebulization 3 milliLiter(s) Nebulizer every 8 hours  atorvastatin 40 milliGRAM(s) Oral at bedtime  chlorhexidine 0.12% Liquid 15 milliLiter(s) Oral Mucosa every 12 hours  chlorhexidine 2% Cloths 1 Application(s) Topical daily  cloNIDine 0.3 milliGRAM(s) Oral every 8 hours  dexMEDEtomidine Infusion 1 MICROgram(s)/kG/Hr IV Continuous <Continuous>  dextrose 5%. 1000 milliLiter(s) IV Continuous <Continuous>  dextrose 5%. 1000 milliLiter(s) IV Continuous <Continuous>  dextrose 50% Injectable 12.5 Gram(s) IV Push once  dextrose 50% Injectable 25 Gram(s) IV Push once  dextrose 50% Injectable 25 Gram(s) IV Push once  dextrose Oral Gel 15 Gram(s) Oral once PRN  doxazosin 6 milliGRAM(s) Oral <User Schedule>  fentaNYL    Injectable 100 MICROGram(s) IV Push once  glucagon  Injectable 1 milliGRAM(s) IntraMuscular once  insulin lispro (ADMELOG) corrective regimen sliding scale   SubCutaneous every 6 hours  insulin NPH human recombinant 5 Unit(s) SubCutaneous every 6 hours  labetalol 600 milliGRAM(s) Oral three times a day  niCARdipine Infusion 2.5 mG/Hr IV Continuous <Continuous>  norepinephrine Infusion 0.05 MICROgram(s)/kG/Min IV Continuous <Continuous>  pantoprazole  Injectable 40 milliGRAM(s) IV Push two times a day  predniSONE   Tablet 40 milliGRAM(s) Oral daily  sevelamer carbonate Powder 1600 milliGRAM(s) Oral three times a day      PHYSICAL EXAM:   Vital Signs:  Vital Signs Last 24 Hrs  T(C): 36.4 (31 Aug 2023 08:00), Max: 37.9 (30 Aug 2023 20:00)  T(F): 97.5 (31 Aug 2023 08:00), Max: 100.3 (30 Aug 2023 20:00)  HR: 56 (31 Aug 2023 09:00) (54 - 83)  BP: 74/11 (30 Aug 2023 15:40) (74/11 - 74/11)  BP(mean): 25 (30 Aug 2023 15:42) (25 - 25)  RR: 12 (31 Aug 2023 09:00) (12 - 16)  SpO2: 100% (31 Aug 2023 09:) (99% - 100%)    Parameters below as of 31 Aug 2023 09:00  Patient On (Oxygen Delivery Method): ventilator    O2 Concentration (%): 30  Daily     Daily Weight in k (31 Aug 2023 00:00)    GENERAL: critically ill  NEURO: not fully alert/oriented  HEENT: NCAT, no conjunctival pallor appreciated  CHEST: intubated, mechanical breath sounds  CARDIAC: regular rate, +S1/S2  ABDOMEN: soft, nontender, no rebound or guarding  RECTAL: dark maroon stool noted  EXTREMITIES: warm, well perfused  SKIN: no lesions noted    LABS: reviewed                        7.5    8.96  )-----------( 158      ( 31 Aug 2023 06:00 )             22.5     08-31    135  |  98  |  42<H>  ----------------------------<  348<H>  3.9   |  24  |  1.75<H>    Ca    8.0<L>      31 Aug 2023 00:15  Phos  2.4       Mg     1.70         TPro  5.0<L>  /  Alb  2.2<L>  /  TBili  0.2  /  DBili  x   /  AST  12  /  ALT  11  /  AlkPhos  94  08    LIVER FUNCTIONS - ( 31 Aug 2023 00:15 )  Alb: 2.2 g/dL / Pro: 5.0 g/dL / ALK PHOS: 94 U/L / ALT: 11 U/L / AST: 12 U/L / GGT: x             Interval Diagnostic Studies: see sunrise for full report   Interval Events:   Patient remains intubated in the ICU.  Melena reported overnight however family at bedside reports red blood in stool.      ROS:   Unable to fully obtain ROS.    Hospital Medications:  acetaminophen   Oral Liquid .. 650 milliGRAM(s) Oral every 6 hours PRN  albuterol/ipratropium for Nebulization 3 milliLiter(s) Nebulizer every 8 hours  atorvastatin 40 milliGRAM(s) Oral at bedtime  chlorhexidine 0.12% Liquid 15 milliLiter(s) Oral Mucosa every 12 hours  chlorhexidine 2% Cloths 1 Application(s) Topical daily  cloNIDine 0.3 milliGRAM(s) Oral every 8 hours  dexMEDEtomidine Infusion 1 MICROgram(s)/kG/Hr IV Continuous <Continuous>  dextrose 5%. 1000 milliLiter(s) IV Continuous <Continuous>  dextrose 5%. 1000 milliLiter(s) IV Continuous <Continuous>  dextrose 50% Injectable 12.5 Gram(s) IV Push once  dextrose 50% Injectable 25 Gram(s) IV Push once  dextrose 50% Injectable 25 Gram(s) IV Push once  dextrose Oral Gel 15 Gram(s) Oral once PRN  doxazosin 6 milliGRAM(s) Oral <User Schedule>  fentaNYL    Injectable 100 MICROGram(s) IV Push once  glucagon  Injectable 1 milliGRAM(s) IntraMuscular once  insulin lispro (ADMELOG) corrective regimen sliding scale   SubCutaneous every 6 hours  insulin NPH human recombinant 5 Unit(s) SubCutaneous every 6 hours  labetalol 600 milliGRAM(s) Oral three times a day  niCARdipine Infusion 2.5 mG/Hr IV Continuous <Continuous>  norepinephrine Infusion 0.05 MICROgram(s)/kG/Min IV Continuous <Continuous>  pantoprazole  Injectable 40 milliGRAM(s) IV Push two times a day  predniSONE   Tablet 40 milliGRAM(s) Oral daily  sevelamer carbonate Powder 1600 milliGRAM(s) Oral three times a day      PHYSICAL EXAM:   Vital Signs:  Vital Signs Last 24 Hrs  T(C): 36.4 (31 Aug 2023 08:00), Max: 37.9 (30 Aug 2023 20:00)  T(F): 97.5 (31 Aug 2023 08:00), Max: 100.3 (30 Aug 2023 20:00)  HR: 56 (31 Aug 2023 09:00) (54 - 83)  BP: 74/11 (30 Aug 2023 15:40) (74/11 - 74/11)  BP(mean): 25 (30 Aug 2023 15:42) (25 - 25)  RR: 12 (31 Aug 2023 09:00) (12 - 16)  SpO2: 100% (31 Aug 2023 09:) (99% - 100%)    Parameters below as of 31 Aug 2023 09:00  Patient On (Oxygen Delivery Method): ventilator    O2 Concentration (%): 30  Daily     Daily Weight in k (31 Aug 2023 00:00)    GENERAL: critically ill  NEURO: not fully alert/oriented  HEENT: NCAT, no conjunctival pallor appreciated  CHEST: intubated, mechanical breath sounds  CARDIAC: regular rate, +S1/S2  ABDOMEN: soft, nontender, no rebound or guarding  RECTAL: dark maroon stool noted  EXTREMITIES: warm, well perfused  SKIN: no lesions noted    LABS: reviewed                        7.5    8.96  )-----------( 158      ( 31 Aug 2023 06:00 )             22.5     08-31    135  |  98  |  42<H>  ----------------------------<  348<H>  3.9   |  24  |  1.75<H>    Ca    8.0<L>      31 Aug 2023 00:15  Phos  2.4       Mg     1.70         TPro  5.0<L>  /  Alb  2.2<L>  /  TBili  0.2  /  DBili  x   /  AST  12  /  ALT  11  /  AlkPhos  94      LIVER FUNCTIONS - ( 31 Aug 2023 00:15 )  Alb: 2.2 g/dL / Pro: 5.0 g/dL / ALK PHOS: 94 U/L / ALT: 11 U/L / AST: 12 U/L / GGT: x

## 2023-08-31 NOTE — PROGRESS NOTE ADULT - ATTENDING COMMENTS
GI reconsulted for recurrent drop in Hgb with associated bloody stools. Previously consulted for CGE at which time family deferred endoscopic evaluation. However, given recurrent bleeding, GI team discussed endoscopic evaluation and family now amenable.     --Plan for EGD given recent CGE; if unremarkable, will plan for flex sig  --PPI BID  --Hold TF  --Consider transfusion goal of 8 given cardiac history    Additional recommendations as above.

## 2023-08-31 NOTE — DIETITIAN NUTRITION RISK NOTIFICATION - ADDITIONAL COMMENTS/DIETITIAN RECOMMENDATIONS
Nutrition follow up completed. Please refer to chart note via documents section in EMR for further recommendations.  Last updated: 8/31/2023

## 2023-08-31 NOTE — PROGRESS NOTE ADULT - SUBJECTIVE AND OBJECTIVE BOX
Patient is a 73y old  Female who presents with a chief complaint of Respiratory distress (31 Aug 2023 13:59)      SUBJECTIVE / OVERNIGHT EVENTS: melena overnight w drop in HGB to 6.4, s/p 1UPRBC, EGD today: , now off DVT ppx and ASA, on prednisone, on PPI. HD 8/30 and next one tian. renal recs: DC RENAGEL    MEDICATIONS  (STANDING):  albuterol/ipratropium for Nebulization 3 milliLiter(s) Nebulizer every 8 hours  atorvastatin 40 milliGRAM(s) Oral at bedtime  chlorhexidine 0.12% Liquid 15 milliLiter(s) Oral Mucosa every 12 hours  chlorhexidine 2% Cloths 1 Application(s) Topical daily  cloNIDine 0.3 milliGRAM(s) Oral every 8 hours  dexMEDEtomidine Infusion 1 MICROgram(s)/kG/Hr (16.6 mL/Hr) IV Continuous <Continuous>  dextrose 5%. 1000 milliLiter(s) (50 mL/Hr) IV Continuous <Continuous>  dextrose 5%. 1000 milliLiter(s) (100 mL/Hr) IV Continuous <Continuous>  dextrose 50% Injectable 12.5 Gram(s) IV Push once  dextrose 50% Injectable 25 Gram(s) IV Push once  dextrose 50% Injectable 25 Gram(s) IV Push once  doxazosin 6 milliGRAM(s) Oral <User Schedule>  glucagon  Injectable 1 milliGRAM(s) IntraMuscular once  insulin lispro (ADMELOG) corrective regimen sliding scale   SubCutaneous every 6 hours  insulin NPH human recombinant 5 Unit(s) SubCutaneous every 6 hours  labetalol 600 milliGRAM(s) Oral three times a day  niCARdipine Infusion 5 mG/Hr (25 mL/Hr) IV Continuous <Continuous>  norepinephrine Infusion 0.05 MICROgram(s)/kG/Min (3.12 mL/Hr) IV Continuous <Continuous>  pantoprazole  Injectable 40 milliGRAM(s) IV Push two times a day  predniSONE   Tablet 40 milliGRAM(s) Oral daily  sevelamer carbonate Powder 1600 milliGRAM(s) Oral three times a day    MEDICATIONS  (PRN):  acetaminophen   Oral Liquid .. 650 milliGRAM(s) Oral every 6 hours PRN Temp greater or equal to 38C (100.4F), Mild Pain (1 - 3)  dextrose Oral Gel 15 Gram(s) Oral once PRN Blood Glucose LESS THAN 70 milliGRAM(s)/deciliter      Vital Signs Last 24 Hrs  T(F): 97.4 (08-31-23 @ 16:00), Max: 100.3 (08-30-23 @ 20:00)  HR: 61 (08-31-23 @ 17:00) (52 - 83)  BP: --  RR: 12 (08-31-23 @ 17:00) (11 - 25)  SpO2: 100% (08-31-23 @ 17:00) (98% - 100%)  Telemetry:   CAPILLARY BLOOD GLUCOSE      POCT Blood Glucose.: 215 mg/dL (31 Aug 2023 17:22)  POCT Blood Glucose.: 345 mg/dL (31 Aug 2023 12:54)  POCT Blood Glucose.: 199 mg/dL (31 Aug 2023 06:00)  POCT Blood Glucose.: 358 mg/dL (31 Aug 2023 00:17)    I&O's Summary    30 Aug 2023 07:01  -  31 Aug 2023 07:00  --------------------------------------------------------  IN: 1649 mL / OUT: 1740 mL / NET: -91 mL    31 Aug 2023 07:01  -  31 Aug 2023 17:51  --------------------------------------------------------  IN: 223.1 mL / OUT: 385 mL / NET: -161.9 mL        PHYSICAL EXAM:  GENERAL: NAD, well-developed  HEAD:  Atraumatic, Normocephalic  EYES: EOMI, PERRLA, conjunctiva and sclera clear  NECK: Supple, No JVD  CHEST/LUNG: Clear to auscultation bilaterally; No wheeze  HEART: Regular rate and rhythm; No murmurs, rubs, or gallops  ABDOMEN: Soft, Nontender, Nondistended; Bowel sounds present  EXTREMITIES:  2+ Peripheral Pulses, No clubbing, cyanosis, or edema  PSYCH: AAOx3  NEUROLOGY: non-focal  SKIN: No rashes or lesions    LABS:                        7.5    8.52  )-----------( 145      ( 31 Aug 2023 13:00 )             22.9     08-31    135  |  98  |  42<H>  ----------------------------<  348<H>  3.9   |  24  |  1.75<H>    Ca    8.0<L>      31 Aug 2023 00:15  Phos  2.4     08-31  Mg     1.70     08-31    TPro  5.0<L>  /  Alb  2.2<L>  /  TBili  0.2  /  DBili  x   /  AST  12  /  ALT  11  /  AlkPhos  94  08-31    PT/INR - ( 31 Aug 2023 00:15 )   PT: 11.6 sec;   INR: 1.03 ratio         PTT - ( 31 Aug 2023 00:15 )  PTT:23.9 sec      Urinalysis Basic - ( 31 Aug 2023 00:15 )    Color: x / Appearance: x / SG: x / pH: x  Gluc: 348 mg/dL / Ketone: x  / Bili: x / Urobili: x   Blood: x / Protein: x / Nitrite: x   Leuk Esterase: x / RBC: x / WBC x   Sq Epi: x / Non Sq Epi: x / Bacteria: x        RADIOLOGY & ADDITIONAL TESTS:    Imaging Personally Reviewed:    Consultant(s) Notes Reviewed:      Care Discussed with Consultants/Other Providers:

## 2023-08-31 NOTE — PROGRESS NOTE ADULT - ASSESSMENT
74 y/o F DM on insulin, HTN, CKD, CHFpEF, breast CA, respiratory arrest and cardiac arrest (2018), CVA with residual weakness, aspiration PNA h/o trache, PEG, both removed presents with respiratory distress requiring intubation. Found to have elevated BNP, may be 2/2 to volume overload i/s/o CKD vs CHF.     GI BLEED:  -now had gi bleed : hb dropped:   -s/p one unit of PRBC transfusion:   -on ppi and steroids too'; :  -for endoscopy now today     Neuro ;  - Sedated : Baseline MS AOx3   -WAS ON PROPOFOL AND FENTANYL BEFORE: NOW OFF:    - Monitor her mental status:  requiring only 30% fio2:    -8/15: - now she has been off sedation for more then 24 hours but is still lethargic: her o2 requirement has not increased:   -for possible extubation if she wakes up   :: -la nena PMD: pt is still lethargic  for MRI brain stem today   ": -had ct head: OK: awaiting MRI brain : still lethargic  : seemed same to me: family at bedside who says she is little  bit more awake: oxygen requirement is same:  at 30%:off vasopressors   -now neuro note reviewed:  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 reason for inability ot trigger vent and co2 retention initially ? candidate for trach    -now the co2 i s normal : not much improvement in her mental statis:  off sedation for days   /8/20: still lethargic: on precedex;  cxr reviewed:  last time at [facility] she was found to have cavity in RUL : she was ruled out for tb : her AFB was positive but was MAC /; defer to MICU   "  still sedated:  no sob:  on 30% fio2:  on precedex:  and is on nicardipine as well as labetalol ; for renal biopsy today  : seems OK: more alert and awake:  renal biopsy is still pending:  la nena family   : doing OK: alert and awake:  but still intubated:  getting cpap : no renal biopsy done   : notable to be weaned:  probably trach next week if she is not extubated by early next week : restarted on bumex  : doing same on cpap trials at this time:  cont iv diuresis per renal ; on predniosne for renal issues:   : seems OK: no sob:  no cugh : no phlegm : on cpap trial: ? extubate L vs trach    : she is hypercarbic acidotic today while being on cpap trials at 8  : the talk is ongoing for extubation trial or trach  : family seems  interested in reintubation in the event if she is extubated:   : pts family is still vacillating between trach or not/;  dw  in detail at bedside dw MICU ACP  : no finally no extubation  pt will go for trach in am : dw family   : remains same: now is awaiting trach: on 30% fio2: and is on full vent support   CVA   - cont aspirin and statin   //-per neurology   : sedated  neuro followin/23:edation:  seems to be doing better:  plan for extubation if family refused for biopsy   : more awake and alert :   : stable  : she is awake but is lethargic  : she is little bit more alert today  :   : mentally she is doing same:  still lethargic but open eyes and respond to questions  : she seems lethargic today  : o n fuill vent support   : sedated on full vent support  : on precedx:  cont full vent support now awaiting trach    Cardiac   -CHFpEF : TTE 2023 with EF 59%, with severe LVH and diastolic dysfunction : pro-BNP > 24894, trop 78   -on bumex drip :  -cards following   8/15: : she is off bumex drip and is on midodrine   : remains off bumex  :: off bumex:  defer to MICU team   cont to remain off diuretics   : she seems same to me: in renal failure off diuretics   : per Micu   : seems stable  : back  on bumex drip    : cont bumex drip    : diuretics per renal   : she is due for hd today  : dw renal : removing 3 L of fluid today  :   : on bumex drip : HD per renal   : per re nal    : off diuretics:  on hd intermittently:  defer to renal    Pulmonary   -Acute hypercapnic and hypoxemic respiratory failure   -DDx: aspiration vs volume overload  VBG with respiratory acidosis pCO2 111  - CXR with small right pleural effusion, no consolidations/opacities  -now she seems better:  fio2: 30$:  -? etiology of hypercarbia:  multifactorial : seems to have OHS and TYRONE superimposed by acute chf  ? and aspiration pneumonia:"   -pt is mostly bed bound:   -it is possible that this time:  she mght need bipap on dc : atleast at the night time:   -on 8/15:  still remains intubated  :still lethargic:  not extubated hypercarbic acidotic today on abg:  weaning trials :probably would need bipap following extubation  : :still lethargic: :awaiting mri brain : dw    : :MRI done has new right cerebellar infarct on mri : defer to MICU   -seen by bora now:  has brain stem infarct to per neuro note:  reason for being vent;    : awaiting renal bipsy today  : and then aggressive weaning  : now no renal biopsy :    : still sightly hypoxic but good sao2:  cont to wean and extubate if needed  still has AMS   :? extubation vs trach   : being weaned: plan as above   : she is on full vent support:  weaning as tolerated:  but now seems heading towards trach   : now for trach ellen rojas    : awaiting trach : o2 requirement has not increased:   /Renal   -Hyperkalemia with EKG changes  : 7.2, hemolyzed, given insulin and D50 + calcium gluc   -K is better today : cont to monitor  -normal today on 8/15   8/16:stable  :-k has been ok for l ast few days  : stable  : started on prednisone yesterday for suspected AIN by renal    : wbc is high : likely secondary to steroids  she has no fveR:  ruled out for tb last time   : for renal biopsy today   : RENAL  BIOPSY NOT DONE:  FAMILY IS THINKING about itl   : biopsy not done: urine output increased:   : doing  ok : no sob:  no biopsy for now:   : per primary pulm tea in MICU   : on cpap trial: hypercarbic today  :   : on full vent suport: samaraey that she will wean and remains extubated for long time:   ? trach    : HD per renal   Endocrine  T2DM : A1C 7.1 in 2023   -cont to monitor blood glucose  ID   - No fever/leukocytosis  - Hx latent TB which was treated, no concern for TB  : She was recently ruled out for tuberculosis at [facility]   - Started on empiric vancomycin and aztreonam (cefepime/zosyn allergy? developed rash req prednisone)  : now dced:   defer to MICU   - f/u MRSA   - follow up blood culture/urine   :blood cultures negative so far: legionella is negative:  remains off antibiotics  staph aureus on sputum   :: off antibiotics at this time: secondary to drug eruption  : remains off antibiotics   -: off diuretics   : off antibiotics   : off antibiotics   : off antibiotics   : off antibtiocs  : remains afebrile:   : off antibiotics  :  remains off antibtiocs    dw micu and  at bedside   overall prognosis seems guarded

## 2023-08-31 NOTE — PROGRESS NOTE ADULT - SUBJECTIVE AND OBJECTIVE BOX
NEPHROLOGY-NS[facility] [phone]        Patient seen and examined remains critically ill intubated and sedated. She had dialysis yesterday hypotensive UF lowered and HD terminated 45 min early. She is s/p EGD and 1 unit PRBC this morning. Currently hypertensive 's.         MEDICATIONS  (STANDING):  albuterol/ipratropium for Nebulization 3 milliLiter(s) Nebulizer every 8 hours  atorvastatin 40 milliGRAM(s) Oral at bedtime  chlorhexidine 0.12% Liquid 15 milliLiter(s) Oral Mucosa every 12 hours  chlorhexidine 2% Cloths 1 Application(s) Topical daily  cloNIDine 0.3 milliGRAM(s) Oral every 8 hours  dexMEDEtomidine Infusion 1 MICROgram(s)/kG/Hr (16.6 mL/Hr) IV Continuous <Continuous>  dextrose 5%. 1000 milliLiter(s) (50 mL/Hr) IV Continuous <Continuous>  dextrose 5%. 1000 milliLiter(s) (100 mL/Hr) IV Continuous <Continuous>  dextrose 50% Injectable 12.5 Gram(s) IV Push once  dextrose 50% Injectable 25 Gram(s) IV Push once  dextrose 50% Injectable 25 Gram(s) IV Push once  doxazosin 6 milliGRAM(s) Oral <User Schedule>  fentaNYL    Injectable 100 MICROGram(s) IV Push once  glucagon  Injectable 1 milliGRAM(s) IntraMuscular once  insulin lispro (ADMELOG) corrective regimen sliding scale   SubCutaneous every 6 hours  insulin NPH human recombinant 5 Unit(s) SubCutaneous every 6 hours  labetalol 600 milliGRAM(s) Oral three times a day  midazolam Injectable 4 milliGRAM(s) IV Push once  niCARdipine Infusion 2.5 mG/Hr (12.5 mL/Hr) IV Continuous <Continuous>  niCARdipine Infusion 5 mG/Hr (25 mL/Hr) IV Continuous <Continuous>  norepinephrine Infusion 0.05 MICROgram(s)/kG/Min (3.12 mL/Hr) IV Continuous <Continuous>  pantoprazole  Injectable 40 milliGRAM(s) IV Push two times a day  predniSONE   Tablet 40 milliGRAM(s) Oral daily  sevelamer carbonate Powder 1600 milliGRAM(s) Oral three times a day      VITAL:  T(C): , Max: 37.9 (08-30-23 @ 20:00)  T(F): , Max: 100.3 (08-30-23 @ 20:00)  HR: 58 (08-31-23 @ 13:30)  BP: 74/11 (08-30-23 @ 15:40)  BP(mean): 25 (08-30-23 @ 15:42)  RR: 11 (08-31-23 @ 13:30)  SpO2: 100% (08-31-23 @ 13:30)  Wt(kg): --    I and O's:    08-30 @ 07:01  -  08-31 @ 07:00  --------------------------------------------------------  IN: 1649 mL / OUT: 1740 mL / NET: -91 mL    08-31 @ 07:01  -  08-31 @ 13:59  --------------------------------------------------------  IN: 49.9 mL / OUT: 70 mL / NET: -20.1 mL          PHYSICAL EXAM:    Constitutional: sedated  HEENT: intubated +OGT  Respiratory: coarse BS  Cardiovascular: S1 and S2  Gastrointestinal: BS+, soft, NT/ND  Extremities: + peripheral edema  Neurological: UTO  : + Wheeler  Skin: No rashes  Access: [facility] HD catheter (8/19)    LABS:                        7.5    8.52  )-----------( 145      ( 31 Aug 2023 13:00 )             22.9     08-31    135  |  98  |  42<H>  ----------------------------<  348<H>  3.9   |  24  |  1.75<H>    Ca    8.0<L>      31 Aug 2023 00:15  Phos  2.4     08-31  Mg     1.70     08-31    TPro  5.0<L>  /  Alb  2.2<L>  /  TBili  0.2  /  DBili  x   /  AST  12  /  ALT  11  /  AlkPhos  94  08-31          Urine Studies:  Urinalysis Basic - ( 31 Aug 2023 00:15 )    Color: x / Appearance: x / SG: x / pH: x  Gluc: 348 mg/dL / Ketone: x  / Bili: x / Urobili: x   Blood: x / Protein: x / Nitrite: x   Leuk Esterase: x / RBC: x / WBC x   Sq Epi: x / Non Sq Epi: x / Bacteria: x            IMPRESSION: 73F w/ HTN, DM2, CVA, breast CA-bilateral mastectomy, recurrent aspiration pneumonia/respiratory failure, and CKD, 8/11/23 p/w acute hypercapnic respiratory failure; c/b RUSS    (1)CKD - stage 4     (2)RUSS - borderline oliguric AIN vs ATN - PUF yesterday, HD today     (3)A/I - ?AIN - on empiric Prednisone 40mg qd - we can plan for 2 week course of 40mg; can start tapering as of 9/1/23.    (4)Hypophosphatemia- likely nutritional and due to phos binders      (5)Pulm- hypercapnic respiratory failure on admission - remains intubated    (6)CV - hypertension - reliant of late on high-dose Labetalol, Clonidine, Doxazosing ultrafiltration, Cardene to be started     (7)Hypokalemia- likely nutritional improved with supplementation     RECOMMEND:  (1)Dialysis tomorrow   (2)Prednisone 40mg po qd through 9/1/23, then start tapering  (3)Would DC Renvela   (4)Dose new meds for GFR ~15ml/min       D/w  at bedside     Tere Arauz NP  [facility]  Office: [phone]       NEPHROLOGY-[facility] [phone]        Patient seen and examined remains critically ill intubated and sedated. She had dialysis yesterday hypotensive UF lowered and HD terminated 45 min early. She is s/p EGD and 1 unit PRBC this morning. Currently hypertensive 's.         MEDICATIONS  (STANDING):  albuterol/ipratropium for Nebulization 3 milliLiter(s) Nebulizer every 8 hours  atorvastatin 40 milliGRAM(s) Oral at bedtime  chlorhexidine 0.12% Liquid 15 milliLiter(s) Oral Mucosa every 12 hours  chlorhexidine 2% Cloths 1 Application(s) Topical daily  cloNIDine 0.3 milliGRAM(s) Oral every 8 hours  dexMEDEtomidine Infusion 1 MICROgram(s)/kG/Hr (16.6 mL/Hr) IV Continuous <Continuous>  dextrose 5%. 1000 milliLiter(s) (50 mL/Hr) IV Continuous <Continuous>  dextrose 5%. 1000 milliLiter(s) (100 mL/Hr) IV Continuous <Continuous>  dextrose 50% Injectable 12.5 Gram(s) IV Push once  dextrose 50% Injectable 25 Gram(s) IV Push once  dextrose 50% Injectable 25 Gram(s) IV Push once  doxazosin 6 milliGRAM(s) Oral <User Schedule>  fentaNYL    Injectable 100 MICROGram(s) IV Push once  glucagon  Injectable 1 milliGRAM(s) IntraMuscular once  insulin lispro (ADMELOG) corrective regimen sliding scale   SubCutaneous every 6 hours  insulin NPH human recombinant 5 Unit(s) SubCutaneous every 6 hours  labetalol 600 milliGRAM(s) Oral three times a day  midazolam Injectable 4 milliGRAM(s) IV Push once  niCARdipine Infusion 2.5 mG/Hr (12.5 mL/Hr) IV Continuous <Continuous>  niCARdipine Infusion 5 mG/Hr (25 mL/Hr) IV Continuous <Continuous>  norepinephrine Infusion 0.05 MICROgram(s)/kG/Min (3.12 mL/Hr) IV Continuous <Continuous>  pantoprazole  Injectable 40 milliGRAM(s) IV Push two times a day  predniSONE   Tablet 40 milliGRAM(s) Oral daily  sevelamer carbonate Powder 1600 milliGRAM(s) Oral three times a day      VITAL:  T(C): , Max: 37.9 (08-30-23 @ 20:00)  T(F): , Max: 100.3 (08-30-23 @ 20:00)  HR: 58 (08-31-23 @ 13:30)  BP: 74/11 (08-30-23 @ 15:40)  BP(mean): 25 (08-30-23 @ 15:42)  RR: 11 (08-31-23 @ 13:30)  SpO2: 100% (08-31-23 @ 13:30)  Wt(kg): --    I and O's:    08-30 @ 07:01  -  08-31 @ 07:00  --------------------------------------------------------  IN: 1649 mL / OUT: 1740 mL / NET: -91 mL    08-31 @ 07:01  -  08-31 @ 13:59  --------------------------------------------------------  IN: 49.9 mL / OUT: 70 mL / NET: -20.1 mL          PHYSICAL EXAM:    Constitutional: sedated  HEENT: intubated +OGT  Respiratory: coarse BS  Cardiovascular: S1 and S2  Gastrointestinal: BS+, soft, NT/ND  Extremities: + peripheral edema  Neurological: UTO  : + Wheeler  Skin: No rashes  Access: [facility] HD catheter (8/19)    LABS:                        7.5    8.52  )-----------( 145      ( 31 Aug 2023 13:00 )             22.9     08-31    135  |  98  |  42<H>  ----------------------------<  348<H>  3.9   |  24  |  1.75<H>    Ca    8.0<L>      31 Aug 2023 00:15  Phos  2.4     08-31  Mg     1.70     08-31    TPro  5.0<L>  /  Alb  2.2<L>  /  TBili  0.2  /  DBili  x   /  AST  12  /  ALT  11  /  AlkPhos  94  08-31          Urine Studies:  Urinalysis Basic - ( 31 Aug 2023 00:15 )    Color: x / Appearance: x / SG: x / pH: x  Gluc: 348 mg/dL / Ketone: x  / Bili: x / Urobili: x   Blood: x / Protein: x / Nitrite: x   Leuk Esterase: x / RBC: x / WBC x   Sq Epi: x / Non Sq Epi: x / Bacteria: x            IMPRESSION: 73F w/ HTN, DM2, CVA, breast CA-bilateral mastectomy, recurrent aspiration pneumonia/respiratory failure, and CKD, 8/11/23 p/w acute hypercapnic respiratory failure; c/b RUSS    (1)CKD - stage 4     (2)RUSS - borderline oliguric AIN vs ATN - PUF yesterday, HD today     (3)A/I - ?AIN - on empiric Prednisone 40mg qd - we can plan for 2 week course of 40mg; can start tapering as of 9/1/23.    (4)Hypophosphatemia- likely nutritional and due to phos binders      (5)Pulm- hypercapnic respiratory failure on admission - remains intubated    (6)CV - hypertension - reliant of late on high-dose Labetalol, Clonidine, Doxazosing ultrafiltration, Cardene to be started     (7)Hypokalemia- likely nutritional improved with supplementation     RECOMMEND:  (1)Dialysis tomorrow   (2)Prednisone 40mg po qd until tomorrow, then start tapering  (3)Would DC Renvela   (4)Dose new meds for GFR ~15ml/min       D/w  at bedside     Tere Arauz NP  [facility]  Office: [phone]       NEPHROLOGY-[facility] [phone]        Patient seen and examined remains critically ill intubated and sedated. She had dialysis yesterday hypotensive UF lowered and HD terminated 45 min early. She is s/p EGD and 1 unit PRBC this morning. Currently hypertensive 's.         MEDICATIONS  (STANDING):  albuterol/ipratropium for Nebulization 3 milliLiter(s) Nebulizer every 8 hours  atorvastatin 40 milliGRAM(s) Oral at bedtime  chlorhexidine 0.12% Liquid 15 milliLiter(s) Oral Mucosa every 12 hours  chlorhexidine 2% Cloths 1 Application(s) Topical daily  cloNIDine 0.3 milliGRAM(s) Oral every 8 hours  dexMEDEtomidine Infusion 1 MICROgram(s)/kG/Hr (16.6 mL/Hr) IV Continuous <Continuous>  dextrose 5%. 1000 milliLiter(s) (50 mL/Hr) IV Continuous <Continuous>  dextrose 5%. 1000 milliLiter(s) (100 mL/Hr) IV Continuous <Continuous>  dextrose 50% Injectable 12.5 Gram(s) IV Push once  dextrose 50% Injectable 25 Gram(s) IV Push once  dextrose 50% Injectable 25 Gram(s) IV Push once  doxazosin 6 milliGRAM(s) Oral <User Schedule>  fentaNYL    Injectable 100 MICROGram(s) IV Push once  glucagon  Injectable 1 milliGRAM(s) IntraMuscular once  insulin lispro (ADMELOG) corrective regimen sliding scale   SubCutaneous every 6 hours  insulin NPH human recombinant 5 Unit(s) SubCutaneous every 6 hours  labetalol 600 milliGRAM(s) Oral three times a day  midazolam Injectable 4 milliGRAM(s) IV Push once  niCARdipine Infusion 2.5 mG/Hr (12.5 mL/Hr) IV Continuous <Continuous>  niCARdipine Infusion 5 mG/Hr (25 mL/Hr) IV Continuous <Continuous>  norepinephrine Infusion 0.05 MICROgram(s)/kG/Min (3.12 mL/Hr) IV Continuous <Continuous>  pantoprazole  Injectable 40 milliGRAM(s) IV Push two times a day  predniSONE   Tablet 40 milliGRAM(s) Oral daily  sevelamer carbonate Powder 1600 milliGRAM(s) Oral three times a day      VITAL:  T(C): , Max: 37.9 (08-30-23 @ 20:00)  T(F): , Max: 100.3 (08-30-23 @ 20:00)  HR: 58 (08-31-23 @ 13:30)  BP: 74/11 (08-30-23 @ 15:40)  BP(mean): 25 (08-30-23 @ 15:42)  RR: 11 (08-31-23 @ 13:30)  SpO2: 100% (08-31-23 @ 13:30)  Wt(kg): --    I and O's:    08-30 @ 07:01  -  08-31 @ 07:00  --------------------------------------------------------  IN: 1649 mL / OUT: 1740 mL / NET: -91 mL    08-31 @ 07:01  -  08-31 @ 13:59  --------------------------------------------------------  IN: 49.9 mL / OUT: 70 mL / NET: -20.1 mL          PHYSICAL EXAM:    Constitutional: sedated  HEENT: intubated +OGT  Respiratory: coarse BS  Cardiovascular: S1 and S2  Gastrointestinal: BS+, soft, NT/ND  Extremities: + peripheral edema  Neurological: UTO  : + Wheeler  Skin: No rashes  Access: [facility] HD catheter (8/19)    LABS:                        7.5    8.52  )-----------( 145      ( 31 Aug 2023 13:00 )             22.9     08-31    135  |  98  |  42<H>  ----------------------------<  348<H>  3.9   |  24  |  1.75<H>    Ca    8.0<L>      31 Aug 2023 00:15  Phos  2.4     08-31  Mg     1.70     08-31    TPro  5.0<L>  /  Alb  2.2<L>  /  TBili  0.2  /  DBili  x   /  AST  12  /  ALT  11  /  AlkPhos  94  08-31          Urine Studies:  Urinalysis Basic - ( 31 Aug 2023 00:15 )    Color: x / Appearance: x / SG: x / pH: x  Gluc: 348 mg/dL / Ketone: x  / Bili: x / Urobili: x   Blood: x / Protein: x / Nitrite: x   Leuk Esterase: x / RBC: x / WBC x   Sq Epi: x / Non Sq Epi: x / Bacteria: x            IMPRESSION: 73F w/ HTN, DM2, CVA, breast CA-bilateral mastectomy, recurrent aspiration pneumonia/respiratory failure, and CKD, 8/11/23 p/w acute hypercapnic respiratory failure; c/b RUSS    (1)CKD - stage 4     (2)RUSS - borderline oliguric AIN vs ATN - PUF yesterday, HD today     (3)A/I - ?AIN - on empiric Prednisone 40mg qd - we can plan for 2 week course of 40mg; can start tapering as of 9/1/23.    (4)Hypophosphatemia- likely nutritional and due to phos binders      (5)Pulm- hypercapnic respiratory failure on admission - remains intubated    (6)CV - hypertension - reliant of late on high-dose Labetalol, Clonidine, Doxazosing ultrafiltration, Cardene to be started     (7)Hypokalemia- likely nutritional improved with supplementation     RECOMMEND:  (1)Dialysis tomorrow   (2)Prednisone 40mg po qd until tomorrow, then start tapering  (3)Would DC Renvela   (4)Dose new meds for GFR ~15ml/min       D/w  at bedside     Tere [facility] NP  [facility]  Office: [phone]      RENAL ATTENDING NOTE  Patient seen and examined with NP. Agree with assessment and plan as above.  At some point we should reattempt forgoing HD to see if she can tolerate doing so. I would favor not reattempting Saturday/Sunday where less staff (including myself) around around. Therefore, we should dialyze her tomorrow; She should not require HD Saturday or Sunday. I will be around Monday to assess whether we can continue to hold HD. I would also favor not giving IV ACEI; the ACEI could lower GFR and lessen ability to tolerate weaning off HD.      Jimmy Colon MD  [facility]  [phone]   NEPHROLOGY-[facility] [phone]        Patient seen and examined remains critically ill intubated and sedated. She had dialysis yesterday hypotensive UF lowered and HD terminated 45 min early. She is s/p EGD and 1 unit PRBC this morning. Currently hypertensive 's.         MEDICATIONS  (STANDING):  albuterol/ipratropium for Nebulization 3 milliLiter(s) Nebulizer every 8 hours  atorvastatin 40 milliGRAM(s) Oral at bedtime  chlorhexidine 0.12% Liquid 15 milliLiter(s) Oral Mucosa every 12 hours  chlorhexidine 2% Cloths 1 Application(s) Topical daily  cloNIDine 0.3 milliGRAM(s) Oral every 8 hours  dexMEDEtomidine Infusion 1 MICROgram(s)/kG/Hr (16.6 mL/Hr) IV Continuous <Continuous>  dextrose 5%. 1000 milliLiter(s) (50 mL/Hr) IV Continuous <Continuous>  dextrose 5%. 1000 milliLiter(s) (100 mL/Hr) IV Continuous <Continuous>  dextrose 50% Injectable 12.5 Gram(s) IV Push once  dextrose 50% Injectable 25 Gram(s) IV Push once  dextrose 50% Injectable 25 Gram(s) IV Push once  doxazosin 6 milliGRAM(s) Oral <User Schedule>  fentaNYL    Injectable 100 MICROGram(s) IV Push once  glucagon  Injectable 1 milliGRAM(s) IntraMuscular once  insulin lispro (ADMELOG) corrective regimen sliding scale   SubCutaneous every 6 hours  insulin NPH human recombinant 5 Unit(s) SubCutaneous every 6 hours  labetalol 600 milliGRAM(s) Oral three times a day  midazolam Injectable 4 milliGRAM(s) IV Push once  niCARdipine Infusion 2.5 mG/Hr (12.5 mL/Hr) IV Continuous <Continuous>  niCARdipine Infusion 5 mG/Hr (25 mL/Hr) IV Continuous <Continuous>  norepinephrine Infusion 0.05 MICROgram(s)/kG/Min (3.12 mL/Hr) IV Continuous <Continuous>  pantoprazole  Injectable 40 milliGRAM(s) IV Push two times a day  predniSONE   Tablet 40 milliGRAM(s) Oral daily  sevelamer carbonate Powder 1600 milliGRAM(s) Oral three times a day      VITAL:  T(C): , Max: 37.9 (08-30-23 @ 20:00)  T(F): , Max: 100.3 (08-30-23 @ 20:00)  HR: 58 (08-31-23 @ 13:30)  BP: 74/11 (08-30-23 @ 15:40)  BP(mean): 25 (08-30-23 @ 15:42)  RR: 11 (08-31-23 @ 13:30)  SpO2: 100% (08-31-23 @ 13:30)  Wt(kg): --    I and O's:    08-30 @ 07:01  -  08-31 @ 07:00  --------------------------------------------------------  IN: 1649 mL / OUT: 1740 mL / NET: -91 mL    08-31 @ 07:01  -  08-31 @ 13:59  --------------------------------------------------------  IN: 49.9 mL / OUT: 70 mL / NET: -20.1 mL          PHYSICAL EXAM:    Constitutional: sedated  HEENT: intubated +OGT  Respiratory: coarse BS  Cardiovascular: S1 and S2  Gastrointestinal: BS+, soft, NT/ND  Extremities: + peripheral edema  Neurological: UTO  : + Wheeler  Skin: No rashes  Access: [facility] HD catheter (8/19)    LABS:                        7.5    8.52  )-----------( 145      ( 31 Aug 2023 13:00 )             22.9     08-31    135  |  98  |  42<H>  ----------------------------<  348<H>  3.9   |  24  |  1.75<H>    Ca    8.0<L>      31 Aug 2023 00:15  Phos  2.4     08-31  Mg     1.70     08-31    TPro  5.0<L>  /  Alb  2.2<L>  /  TBili  0.2  /  DBili  x   /  AST  12  /  ALT  11  /  AlkPhos  94  08-31          Urine Studies:  Urinalysis Basic - ( 31 Aug 2023 00:15 )    Color: x / Appearance: x / SG: x / pH: x  Gluc: 348 mg/dL / Ketone: x  / Bili: x / Urobili: x   Blood: x / Protein: x / Nitrite: x   Leuk Esterase: x / RBC: x / WBC x   Sq Epi: x / Non Sq Epi: x / Bacteria: x            IMPRESSION: 73F w/ HTN, DM2, CVA, breast CA-bilateral mastectomy, recurrent aspiration pneumonia/respiratory failure, and CKD, 8/11/23 p/w acute hypercapnic respiratory failure; c/b RUSS    (1)CKD - stage 4     (2)RUSS - borderline oliguric AIN vs ATN - PUF yesterday, HD today     (3)A/I - ?AIN - on empiric Prednisone 40mg qd - we can plan for 2 week course of 40mg; can start tapering as of 9/1/23.    (4)Hypophosphatemia- likely nutritional and due to phos binders      (5)Pulm- hypercapnic respiratory failure on admission - remains intubated    (6)CV - hypertension - reliant of late on high-dose Labetalol, Clonidine, Doxazosing ultrafiltration, Cardene to be started     (7)Hypokalemia- likely nutritional improved with supplementation     RECOMMEND:  (1)Dialysis tomorrow   (2)Prednisone 40mg po qd until tomorrow, then start tapering  (3)Would DC Renvela   (4)Dose new meds for GFR ~15ml/min       D/w  at bedside     [facility] NP  [facility]  Office: [phone]      RENAL ATTENDING NOTE  Patient seen and examined with NP. Agree with assessment and plan as above.  At some point we should reattempt forgoing HD to see if she can tolerate doing so. I would favor not reattempting Saturday/Sunday where less staff (including myself) around around. Therefore, we should dialyze her tomorrow; She should not require HD Saturday or Sunday. I will be around Monday to assess whether we can continue to hold HD. I would also favor not giving IV ACEI; the ACEI could lower GFR and lessen ability to tolerate weaning off HD. Calcium channel blockade appears to be the better option.      Jimmy Colon MD  [facility]  [phone]

## 2023-08-31 NOTE — PROGRESS NOTE ADULT - ASSESSMENT
74 y/o F DM on insulin, HTN, CKD, CHFpEF, breast CA, respiratory arrest and cardiac arrest (2018), CVA with residual weakness, aspiration PNA h/o trache, PEG, both removed presents with respiratory distress requiring intubation. May be volume overload i/s/o CHF/CKD vs decrease respiratory drive (given oxygen at home). Improving mental status however worsening renal function and anuria, concerning for ATN vs AIN.     Neuro   #AMS  #Metabolic encephalopathy   #CVA   Baseline MS AOx3 with short term memory changes per family  - MRI brain 8/17 showed right cerebellar infarct (new) with old left PCA/occipital infarcts, likely embolic in nature - STEPHANIE with no TRINITY thrombus or intracardiac shunts  - Now with mild sedation with Precedex for ventilator synchrony  - Intermittent shaking noted, per family happens at baseline but more pronounced here; EEG without epileptiform activity, per neuro no need for LP  - Restarted aspirin 8/26 - held 8/31 iso bleed    Cardiac   #Hypertension; SBP max 200s ? iso renal dysfunction/overload  - Labetalol 600 TID - will hold off on further increases to prevent bradycardia  - Clonidine increased to 0.3 TID   - Restarted home Cardura; holding home coreg as on labetalol    #Shock  - Resolved     #CHFpEF   - TTE 7/2023 with EF 59%, with severe LVH and diastolic dysfunction   - pro-BNP > 42076, trop 78   - Repeat TTE with EF 60% normal systolic and grade 1 diastolic dysfunction     Pulmonary   #Acute hypercapnic and hypoxemic respiratory failure   - DDx: aspiration vs volume overload vs sedating medication decreasing drive (was given nyquil)   - Has not tolerated multiple CPAP trials, plan for trach when stable    /Renal   #RUSS on CKD  - Ddx: obstructive (willard in, no hydro on POCUS) vs low flow state vs AIN i/s/o cephalosporin use and drug rash   - kidney biopsy to r/o AIN planned 8/22, however family felt procedure too risky and so deferred  - cont empiric prednisone 40mg started 8/18   - Continue to monitor, dose meds per eGFR. avoid nephrotoxic agents  - No HD today  - Strict IOs per nephro; Willard in place for UOP monitoring  - c/w prednisone 40 for 14 days per nephro for AIN, then taper (8/18-9/1 then taper)    # Hyperphosphatemia  - Continue to monitor BMP q12  - Started on renvela powder TID per nephro recs    GI  - Had one episode of coffee ground emesis 8/24, two episodes melena 8/31  - GI consulted, scope planned for today  - Continue PPI IV BID iso recent CGE, will transition to PO post extubation  - NPO for procedure    Endocrine  #T2DM   - ISS  - NPH DC'd while NPO, will restart when tube feeds restarted    ID   Started on empiric vancomycin and aztreonam (cefepime/zosyn allergy? developed rash req prednisone)   Trialed cefepime --> cefazolin (sputum MSSA), developed drug eruption - discontinued   Blood and urine cultures 8/11 demonstrating no growth currently, no clinical signs of infection  - Monitor off antibiotics    Heme/Onc  ppx: heparin q8 hrs - held iso bleed  - S/p PRBCs with appropriate response    Ethics  GOC - Per previous GOC with daughter and , reported that they have not talked about end of life care with the patient. For now, they wanted "everything to be done" including CPR, continuing with mech ventilation/intubation, pressors

## 2023-08-31 NOTE — PROGRESS NOTE ADULT - ASSESSMENT
73 year old female with intermittent episodes of epistaxis, DM on insulin, HTN, CKD, CHFpEF, breast CA s/p mastectomy, Latent Tb, respiratory failure and cardiac arrest (2018), CVA with residual weakness, aspiration PNA h/o trach and prior PEG for dysphagia in 2018, both removed who initially presented to the ED with respiratory distress requiring intubation and remains intubated, PNA-Staph aureus, OGT placed with worsening renal function and concerns for ATN vs AIN, started on prednisone 40 on 8/18 /w/o PPI, with red blood noted in oral suctioning with respiratory therapy on Sunday 8/20; had multiple episodes of NBNB vomiting on Wednesday/Thursday per family, then yesterday had one episode of coffee ground emesis, PPI 40 IV BID subsequently started.  Overnight on 8/30-8/31 patient developed dark maroon stools with concomitant drop in hemoglobin.    # Dark maroon stools  # Coffee ground emesis  Clinical presentation most consistent with upper GI bleeding, the differential diagnosis for which includes esophagitis, peptic ulcer disease, erosive gastropathy, arteriovenous malformation [AVM], malignancy, or less likely varices, portal hypertensive gastropathy, GAVE, Delmis-Archibald tear, Dieulafoy lesion, or Garrett lesions.  However, dark maroon stools also raises possibility for lower GI bleeding if upper GI source not identified.    Recommendations:  -trend vitals, CBC, and monitor for clinical signs of bleeding  -maintain active type and screen  -transfusion goal to maintain hemoglobin >/= 7.0 and platelets >/= 50  -avoid NSAIDs  -PPI IV BID  -two tap water enemas now  -keep NPO for tentative EGD, push enteroscopy, +/- flex sig today    Note incomplete until finalized by attending signature/attestation.    Geremias Olmedo  GI/Hepatology Fellow    MONDAY-FRIDAY 8AM-5PM:  Pager# [phone] ([facility]) or [phone] ([facility])    NON-URGENT CONSULTS:  Please email [email or web address]  AT NIGHT AND ON WEEKENDS:  Contact on-call GI fellow via answering service ([phone]) from 5pm-8am and on weekends/holidays

## 2023-08-31 NOTE — CHART NOTE - NSCHARTNOTEFT_GEN_A_CORE
NUTRITION FOLLOW-UP      73 y.o. DM, HTN, CKD, CHFpEF, breast Ca, respiratory and cardiac arrest (2018), CVA w residual weakness s/p trach/PEG which has since been removed.  Presenting with respiratory distress requiring intubation.   Pt. with acute hypoxic respiratory failure with hypoxia and hypercapnia which is multifactorial including aspiration and acute on chronic diastolic CHF.  Pt. also RUSS requiring HD.  Pt. remains intubated.  No longer on Propofol for sedation.  Pending trach today, but also now with concern for GIB, per RN.  Hence, NPO @ this time.    +Liquid/red BM (8/31). No noted/reported vomiting.    Maintain NPO status, pending further GI workup.      Pt. had been receiving Nepro via OGT, however intermittently held or below goal for procedures.  Hence, inconsistent and inadequate energy/protein intake (<75% x at least 7d).  Pt. previously recommended by RDN for addition of protein supplement (NoCarb Prosource 1x daily) to help meet increased protein requirements.    Also with persistent and significant 12.8% weight loss since admit (x20d).  Perhaps somewhat fluid related considering diuresis during admission, as well as HD.  Although Pt. also with moderate fluid accumulation/edema.    Pt. currently deemed acutely severely malnourished.    Glucose values elevated.  Of note, Prednisone likely contributing.  Adjust insulin regimen as medically appropriate.        _________________Diet___________________  Diet, NPO after Midnight:      NPO Start Date: 30-Aug-2023,   NPO Start Time: 23:59  Except Medications (08-30-23 @ 11:36) [Active]      PRIOR TO NPO:   Diet, NPO with Tube Feed:   Tube Feeding Modality: Orogastric  Nepro with Carb Steady (NEPRORTH)  Total Volume for 24 Hours (mL): 280  Continuous  Starting Tube Feed Rate {mL per Hour}: 10  Increase Tube Feed Rate by (mL): 10     Every 4 hours  Until Goal Tube Feed Rate (mL per Hour): 40  Tube Feed Duration (in Hours): 7  Tube Feed Start Time: 17:49  Tube Feed Stop Time: 00:00 (08-26-23 @ 14:43) [Active]      Weight:                    Height:   61"                 Upper 10% Ideal Body Weight:  115.5lbs / 52.5kg   58.0kg (8/31)  67.1kg (8/21)  74.7kg (8/16)  68.6kg (8/13)  66.5kg (8/11)        _____Estimated Energy Needs (based upper 10% Ideal Body Weight)_____  25-30 kcals/kg = 7777-6559 kcals/d  1.5-1.8.g protein/kg = 80-95g protein/d      Edema: 2+ generalized, 3+ b/l arms/legs  Skin: right heel stage II pressure injury        ________________________Pertinent Medications____________   MEDICATIONS  (STANDING):  albuterol/ipratropium for Nebulization 3 milliLiter(s) Nebulizer every 8 hours  atorvastatin 40 milliGRAM(s) Oral at bedtime  chlorhexidine 0.12% Liquid 15 milliLiter(s) Oral Mucosa every 12 hours  chlorhexidine 2% Cloths 1 Application(s) Topical daily  cloNIDine 0.3 milliGRAM(s) Oral every 8 hours  dexMEDEtomidine Infusion 1 MICROgram(s)/kG/Hr (16.6 mL/Hr) IV Continuous <Continuous>  dextrose 5%. 1000 milliLiter(s) (50 mL/Hr) IV Continuous <Continuous>  dextrose 5%. 1000 milliLiter(s) (100 mL/Hr) IV Continuous <Continuous>  dextrose 50% Injectable 12.5 Gram(s) IV Push once  dextrose 50% Injectable 25 Gram(s) IV Push once  dextrose 50% Injectable 25 Gram(s) IV Push once  doxazosin 6 milliGRAM(s) Oral <User Schedule>  fentaNYL    Injectable 100 MICROGram(s) IV Push once  glucagon  Injectable 1 milliGRAM(s) IntraMuscular once  insulin lispro (ADMELOG) corrective regimen sliding scale   SubCutaneous every 6 hours  insulin NPH human recombinant 5 Unit(s) SubCutaneous every 6 hours  labetalol 600 milliGRAM(s) Oral three times a day  niCARdipine Infusion 2.5 mG/Hr (12.5 mL/Hr) IV Continuous <Continuous>  norepinephrine Infusion 0.05 MICROgram(s)/kG/Min (3.12 mL/Hr) IV Continuous <Continuous>  pantoprazole  Injectable 40 milliGRAM(s) IV Push two times a day  predniSONE   Tablet 40 milliGRAM(s) Oral daily  sevelamer carbonate Powder 1600 milliGRAM(s) Oral three times a day    MEDICATIONS  (PRN):  acetaminophen   Oral Liquid .. 650 milliGRAM(s) Oral every 6 hours PRN Temp greater or equal to 38C (100.4F), Mild Pain (1 - 3)  dextrose Oral Gel 15 Gram(s) Oral once PRN Blood Glucose LESS THAN 70 milliGRAM(s)/deciliter          _________________________Pertinent Labs____________________     08-31    135  |  98  |  42<H>  ----------------------------<  348<H>  3.9   |  24  |  1.75<H>    Ca    8.0<L>      31 Aug 2023 00:15  Phos  2.4     08-31  Mg     1.70     08-31    TPro  5.0<L>  /  Alb  2.2<L>  /  TBili  0.2  /  DBili  x   /  AST  12  /  ALT  11  /  AlkPhos  94  08-31                                                                   7.5    8.96  )-----------( 158      ( 31 Aug 2023 06:00 )             22.5         CAPILLARY BLOOD GLUCOSE      POCT Blood Glucose.: 199 mg/dL (31 Aug 2023 06:00)    POCT Blood Glucose.: 199 mg/dL (08-31-23 @ 06:00)  POCT Blood Glucose.: 358 mg/dL (08-31-23 @ 00:17)  POCT Blood Glucose.: 244 mg/dL (08-30-23 @ 17:28)  POCT Blood Glucose.: 293 mg/dL (08-30-23 @ 12:25)      _______________New Nutrition Dx__________________  Pt. meets criteria for severe malnutrition in the context of acute illness as evidenced by 2+ to 3+ edema, significant >12% weight loss x <1 mth, inadequate energy intake         PLAN/RECOMMENDATIONS:    1) NPO pending further GI workup.  2) If/when appropriate to resume TF, suggest Nepro with decreased goal of 35mL/hr + 1 packet NoCarb Prosource per day    provides:  840mL total volume  1512 kcals  68g protein (+ 15g additional protein via NoCarb Prosource)= 83g  611mL free water     2) Obtain daily weights  3) Consider modifying insulin regimen  4) Monitor GI status, electrolytes, glucose     RDN remains available and will f/u PRN.          Jaylene Ybarra, JERMAINEN, CDN       pager [phone] or MS Teams

## 2023-08-31 NOTE — PROGRESS NOTE ADULT - ASSESSMENT
74 y/o F well known to me from my [facility] out\[facility]\"" practice. she was admitted at [facility] 7/12-7/22 w aspiration PNA, was treated w CEFEPIME, developed an allergic rash,  dCHF, + MAC on AFB culture, had been progressively getting more and more lethargic and dyspneic at home since DC.   In  am of 8/11/23  ptn presented with respiratory distress w hypoxia and hypercarbia requiring intubation 2/2 volume overload +/- Asp PNA      Neuro   responds appropriately while intubated  Baseline MS AOx3, aphasic   - h/o CVA , on aspirin and statin . 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 probably embolic in nature, did not tolerate full AC in the past, STEPHANIE is neg , no shunts observed      Cardiac   Ptn well known to Dr. Dunn, consult reviewed   CHFpEF   TTE 7/2023 with EF 59%, with severe LVH and diastolic dysfunction   pro-BNP > 11316, trop 78   on cardine drip for bp control and labetalol po tid 600 mg, clonidine raised to 0.3 mg tid     Pulmonary   Acute hypercapnic and hypoxemic respiratory failure   well known to Dr. Mcnulty, consult reviewed   DDx: aspiration vs volume overload   VBG with respiratory acidosis pCO2 111  CT chest: mod ( worsening) R pl effusion, h/o RUL cavity, +MAC  - cefepime DCed on 8/13, started on Moxifloxacin on 8/13, since 8/14 off ABx  - intubated,  on precedex, , on full vent support, prob will need a trache    Renal   Hyperkalemia with EKG changes  , initially treated w LOKELMA,  now resolved   Ptn well know to Dr. Colon, consult reviewed      worsening renal function, oliguric, fluid overloaded, on Bumex drip, worsening azotemia, may need HD, renal following. BP elevated, on labetalol cardene, clonidine, on empiric steroids for AIN  through 9/1, start tapering thereafter   HD 8/19,  8/21, 8/26 and 8/28.  s/p PUF 8/29 2.5 Liters, HD 8/30, next 9/1  cont HD as per renal  on bumex  drip , but renal recommends to DC  renal biopsy has been deferred , ASA  resumed, AC on hold    GI  NPO for now, on tube feeds  coffee ground emesis x 1 8/24, melena overnight, s/p 1UPRBC, EGD/COlonoscopy: erosive gastropathy, esophagisits, on colonoscopy old blood seen no active bleeding, poor prep    Endocrine  T2DM   A1C 7.1 in 7/2023   - BG goal 140-200     ID   No fever/leukocytosis, recheck temp   Hx latent TB which was treated, no concern for TB   - grew MAC on AFB culture from most recent admission. would call ID consult  Started on empiric vancomycin and aztreonam,  changed to cefepime 8/12, cefepime dced on 8/13(cefepime/zosyn allergy.  developed rash when on cefepime on previous admission ) . started on AVALOX on 8/13, off ABx on 8/14. ptn has an allergic rash, prob 2/2 cefepime  - f/u MRSA   - all cxs NTD    Heme/Onc  ppx: heparin q8 hrs     Ethics  GOC - Discussed GOC with daughter and , they have opted in the past for full code. and she remains full code at present

## 2023-08-31 NOTE — PROGRESS NOTE ADULT - SUBJECTIVE AND OBJECTIVE BOX
*******************************  Martha Douglas PGY-2  *******************************    INTERVAL HPI/OVERNIGHT EVENTS: Patient had two episodes of melena overnight, heparin subQ discontinued. CBC showed hgb drop to 6.4, for which she received 1 unit PRBCs with appropriate response. Her blood pressure also increased to SBP 180s-190s so her clonidine was increased back to 0.3 TID.    SUBJECTIVE: Patient seen and examined at bedside. Sedated and intubated, responds to verbal stimuli, denies pain.     OBJECTIVE:    VITAL SIGNS:  ICU Vital Signs Last 24 Hrs  T(C): 36.4 (31 Aug 2023 08:00), Max: 37.9 (30 Aug 2023 20:00)  T(F): 97.5 (31 Aug 2023 08:00), Max: 100.3 (30 Aug 2023 20:00)  HR: 60 (31 Aug 2023 11:00) (54 - 83)  BP: 74/11 (30 Aug 2023 15:40) (74/11 - 74/11)  BP(mean): 25 (30 Aug 2023 15:42) (25 - 25)  ABP: 204/51 (31 Aug 2023 11:00) (110/19 - 223/59)  ABP(mean): 105 (31 Aug 2023 11:00) (45 - 118)  RR: 11 (31 Aug 2023 11:00) (11 - 18)  SpO2: 100% (31 Aug 2023 11:00) (99% - 100%)    O2 Parameters below as of 31 Aug 2023 11:00      O2 Concentration (%): 100      Mode: AC/ CMV (Assist Control/ Continuous Mandatory Ventilation), RR (machine): 12, TV (machine): 360, FiO2: 30, PEEP: 5, ITime: 0.8, MAP: 9, PIP: 34    08-30 @ 07:01  -  08-31 @ 07:00  --------------------------------------------------------  IN: 1649 mL / OUT: 1740 mL / NET: -91 mL    08-31 @ 07:01  - 08-31 @ 11:08  --------------------------------------------------------  IN: 49.9 mL / OUT: 70 mL / NET: -20.1 mL      CAPILLARY BLOOD GLUCOSE      POCT Blood Glucose.: 199 mg/dL (31 Aug 2023 06:00)        PHYSICAL EXAM:  General: Comfortable, no acute distress, cooperative with exam. Sedated/intubated  HEENT: PERRLA, EOMI, moist mucous membranes.  Respiratory: CTAB, mechanical breath sounds, no coughing, wheezes, crackles, or rales.  CV: RRR, S1S2, no murmurs, rubs or gallops. No JVD. Distal pulses intact.  Abdominal: Soft, nontender, nondistended. Rectal pouch with maroon stools  Neurology: Sedated/intubated, responsive to verbal stimuli, no focal neuro defects, CENTENO x 4.  Extremities: +Pitting edema bilaterally, improved      MEDICATIONS:  MEDICATIONS  (STANDING):  albuterol/ipratropium for Nebulization 3 milliLiter(s) Nebulizer every 8 hours  atorvastatin 40 milliGRAM(s) Oral at bedtime  chlorhexidine 0.12% Liquid 15 milliLiter(s) Oral Mucosa every 12 hours  chlorhexidine 2% Cloths 1 Application(s) Topical daily  cloNIDine 0.3 milliGRAM(s) Oral every 8 hours  dexMEDEtomidine Infusion 1 MICROgram(s)/kG/Hr (16.6 mL/Hr) IV Continuous <Continuous>  dextrose 5%. 1000 milliLiter(s) (100 mL/Hr) IV Continuous <Continuous>  dextrose 5%. 1000 milliLiter(s) (50 mL/Hr) IV Continuous <Continuous>  dextrose 50% Injectable 12.5 Gram(s) IV Push once  dextrose 50% Injectable 25 Gram(s) IV Push once  dextrose 50% Injectable 25 Gram(s) IV Push once  doxazosin 6 milliGRAM(s) Oral <User Schedule>  fentaNYL    Injectable 200 MICROGram(s) IV Push once  glucagon  Injectable 1 milliGRAM(s) IntraMuscular once  insulin lispro (ADMELOG) corrective regimen sliding scale   SubCutaneous every 6 hours  insulin NPH human recombinant 5 Unit(s) SubCutaneous every 6 hours  labetalol 600 milliGRAM(s) Oral three times a day  midazolam Injectable 6 milliGRAM(s) IV Push once  niCARdipine Infusion 2.5 mG/Hr (12.5 mL/Hr) IV Continuous <Continuous>  norepinephrine Infusion 0.05 MICROgram(s)/kG/Min (3.12 mL/Hr) IV Continuous <Continuous>  pantoprazole  Injectable 40 milliGRAM(s) IV Push two times a day  predniSONE   Tablet 40 milliGRAM(s) Oral daily  sevelamer carbonate Powder 1600 milliGRAM(s) Oral three times a day    MEDICATIONS  (PRN):  acetaminophen   Oral Liquid .. 650 milliGRAM(s) Oral every 6 hours PRN Temp greater or equal to 38C (100.4F), Mild Pain (1 - 3)  dextrose Oral Gel 15 Gram(s) Oral once PRN Blood Glucose LESS THAN 70 milliGRAM(s)/deciliter      ALLERGIES:  Allergies    isoniazid (Rash)  nafcillin (Unknown)  hydrALAZINE (Rash)  vitamin E (Short breath; Urticaria; Hives)  doxycycline (Rash)  cefepime (Rash)  NIFEdipine (Urticaria; Hives)    Intolerances        LABS:                        7.5    8.96  )-----------( 158      ( 31 Aug 2023 06:00 )             22.5     08-31    135  |  98  |  42<H>  ----------------------------<  348<H>  3.9   |  24  |  1.75<H>    Ca    8.0<L>      31 Aug 2023 00:15  Phos  2.4     08-31  Mg     1.70     08-31    TPro  5.0<L>  /  Alb  2.2<L>  /  TBili  0.2  /  DBili  x   /  AST  12  /  ALT  11  /  AlkPhos  94  08-31    PT/INR - ( 31 Aug 2023 00:15 )   PT: 11.6 sec;   INR: 1.03 ratio         PTT - ( 31 Aug 2023 00:15 )  PTT:23.9 sec  Urinalysis Basic - ( 31 Aug 2023 00:15 )    Color: x / Appearance: x / SG: x / pH: x  Gluc: 348 mg/dL / Ketone: x  / Bili: x / Urobili: x   Blood: x / Protein: x / Nitrite: x   Leuk Esterase: x / RBC: x / WBC x   Sq Epi: x / Non Sq Epi: x / Bacteria: x        RADIOLOGY & ADDITIONAL TESTS: Reviewed.

## 2023-08-31 NOTE — PROGRESS NOTE ADULT - SUBJECTIVE AND OBJECTIVE BOX
Date of Service: 08-31-23 @ 10:24    Patient is a 73y old  Female who presents with a chief complaint of anemia (31 Aug 2023 09:51)      Any change in ROS: seems ok: new events  overnight h ad gi bleed:  for endoscopy today :  daughters are at bedside    MEDICATIONS  (STANDING):  albuterol/ipratropium for Nebulization 3 milliLiter(s) Nebulizer every 8 hours  atorvastatin 40 milliGRAM(s) Oral at bedtime  chlorhexidine 0.12% Liquid 15 milliLiter(s) Oral Mucosa every 12 hours  chlorhexidine 2% Cloths 1 Application(s) Topical daily  cloNIDine 0.3 milliGRAM(s) Oral every 8 hours  dexMEDEtomidine Infusion 1 MICROgram(s)/kG/Hr (16.6 mL/Hr) IV Continuous <Continuous>  dextrose 5%. 1000 milliLiter(s) (50 mL/Hr) IV Continuous <Continuous>  dextrose 5%. 1000 milliLiter(s) (100 mL/Hr) IV Continuous <Continuous>  dextrose 50% Injectable 12.5 Gram(s) IV Push once  dextrose 50% Injectable 25 Gram(s) IV Push once  dextrose 50% Injectable 25 Gram(s) IV Push once  doxazosin 6 milliGRAM(s) Oral <User Schedule>  fentaNYL    Injectable 100 MICROGram(s) IV Push once  glucagon  Injectable 1 milliGRAM(s) IntraMuscular once  insulin lispro (ADMELOG) corrective regimen sliding scale   SubCutaneous every 6 hours  insulin NPH human recombinant 5 Unit(s) SubCutaneous every 6 hours  labetalol 600 milliGRAM(s) Oral three times a day  niCARdipine Infusion 2.5 mG/Hr (12.5 mL/Hr) IV Continuous <Continuous>  norepinephrine Infusion 0.05 MICROgram(s)/kG/Min (3.12 mL/Hr) IV Continuous <Continuous>  pantoprazole  Injectable 40 milliGRAM(s) IV Push two times a day  predniSONE   Tablet 40 milliGRAM(s) Oral daily  sevelamer carbonate Powder 1600 milliGRAM(s) Oral three times a day    MEDICATIONS  (PRN):  acetaminophen   Oral Liquid .. 650 milliGRAM(s) Oral every 6 hours PRN Temp greater or equal to 38C (100.4F), Mild Pain (1 - 3)  dextrose Oral Gel 15 Gram(s) Oral once PRN Blood Glucose LESS THAN 70 milliGRAM(s)/deciliter    Vital Signs Last 24 Hrs  T(C): 36.4 (31 Aug 2023 08:00), Max: 37.9 (30 Aug 2023 20:00)  T(F): 97.5 (31 Aug 2023 08:00), Max: 100.3 (30 Aug 2023 20:00)  HR: 56 (31 Aug 2023 09:00) (54 - 83)  BP: 74/11 (30 Aug 2023 15:40) (74/11 - 74/11)  BP(mean): 25 (30 Aug 2023 15:42) (25 - 25)  RR: 12 (31 Aug 2023 09:00) (12 - 16)  SpO2: 100% (31 Aug 2023 09:00) (99% - 100%)    Parameters below as of 31 Aug 2023 09:00  Patient On (Oxygen Delivery Method): ventilator    O2 Concentration (%): 30  Mode: AC/ CMV (Assist Control/ Continuous Mandatory Ventilation)  RR (machine): 12  TV (machine): 360  FiO2: 30  PEEP: 5  ITime: 0.8  MAP: 11  PIP: 38    I&O's Summary    30 Aug 2023 07:01  -  31 Aug 2023 07:00  --------------------------------------------------------  IN: 1649 mL / OUT: 1740 mL / NET: -91 mL    31 Aug 2023 07:01  -  31 Aug 2023 10:24  --------------------------------------------------------  IN: 16.6 mL / OUT: 70 mL / NET: -53.4 mL          Physical Exam:   GENERAL: NAD, well-groomed, well-developed  HEENT: MANDY/   Atraumatic, Normocephalic  ENMT: No tonsillar erythema, exudates, or enlargement; Moist mucous membranes, Good dentition, No lesions  NECK: Supple, No JVD, Normal thyroid  CHEST/LUNG: Clear to auscultaion  CVS: Regular rate and rhythm; No murmurs, rubs, or gallops  GI: : Soft, Nontender, Nondistended; Bowel sounds present  NERVOUS SYSTEM:  lethargic and full intubeted  EXTREMITIES:+ edema  LYMPH: No lymphadenopathy noted  SKIN: No rashes or lesions  ENDOCRINOLOGY: No Thyromegaly  PSYCH: letahrgic    Labs:  ABG - ( 31 Aug 2023 00:15 )  pH, Arterial: 7.41  pH, Blood: x     /  pCO2: 40    /  pO2: 241   / HCO3: 25    / Base Excess: 0.7   /  SaO2: 98.8                                        7.5    8.96  )-----------( 158      ( 31 Aug 2023 06:00 )             22.5                         7.4    10.85 )-----------( 162      ( 31 Aug 2023 03:00 )             22.4                         6.2    10.91 )-----------( 173      ( 31 Aug 2023 00:15 )             19.0                         8.0    11.27 )-----------( 229      ( 30 Aug 2023 00:05 )             24.6                         8.7    13.01 )-----------( 254      ( 29 Aug 2023 01:10 )             27.4                         6.9    14.41 )-----------( 280      ( 28 Aug 2023 05:25 )             21.2                         7.0    14.07 )-----------( 294      ( 28 Aug 2023 00:30 )             21.9     08-31    135  |  98  |  42<H>  ----------------------------<  348<H>  3.9   |  24  |  1.75<H>  08-30    135  |  97<L>  |  57<H>  ----------------------------<  402<H>  3.3<L>   |  23  |  2.29<H>  08-29    134<L>  |  96<L>  |  45<H>  ----------------------------<  255<H>  3.5   |  24  |  1.98<H>  08-28    137  |  99  |  64<H>  ----------------------------<  227<H>  4.1   |  22  |  2.55<H>  08-27    137  |  100  |  60<H>  ----------------------------<  233<H>  3.8   |  24  |  2.28<H>    Ca    8.0<L>      31 Aug 2023 00:15  Ca    8.2<L>      30 Aug 2023 00:05  Phos  2.4     08-31  Phos  4.1     08-30  Mg     1.70     08-31  Mg     1.80     08-30    TPro  5.0<L>  /  Alb  2.2<L>  /  TBili  0.2  /  DBili  x   /  AST  12  /  ALT  11  /  AlkPhos  94  08-31  TPro  5.6<L>  /  Alb  2.5<L>  /  TBili  0.2  /  DBili  x   /  AST  9   /  ALT  10  /  AlkPhos  123<H>  08-30  TPro  6.0  /  Alb  2.7<L>  /  TBili  0.3  /  DBili  x   /  AST  17  /  ALT  15  /  AlkPhos  113  08-29  TPro  5.5<L>  /  Alb  2.5<L>  /  TBili  0.2  /  DBili  x   /  AST  10  /  ALT  17  /  AlkPhos  121<H>  08-28    CAPILLARY BLOOD GLUCOSE      POCT Blood Glucose.: 199 mg/dL (31 Aug 2023 06:00)  POCT Blood Glucose.: 358 mg/dL (31 Aug 2023 00:17)  POCT Blood Glucose.: 244 mg/dL (30 Aug 2023 17:28)  POCT Blood Glucose.: 293 mg/dL (30 Aug 2023 12:25)      LIVER FUNCTIONS - ( 31 Aug 2023 00:15 )  Alb: 2.2 g/dL / Pro: 5.0 g/dL / ALK PHOS: 94 U/L / ALT: 11 U/L / AST: 12 U/L / GGT: x           PT/INR - ( 31 Aug 2023 00:15 )   PT: 11.6 sec;   INR: 1.03 ratio         PTT - ( 31 Aug 2023 00:15 )  PTT:23.9 sec  Urinalysis Basic - ( 31 Aug 2023 00:15 )    Color: x / Appearance: x / SG: x / pH: x  Gluc: 348 mg/dL / Ketone: x  / Bili: x / Urobili: x   Blood: x / Protein: x / Nitrite: x   Leuk Esterase: x / RBC: x / WBC x   Sq Epi: x / Non Sq Epi: x / Bacteria: x            RECENT CULTURES:  rad< from: Xray Chest 1 View- PORTABLE-Urgent (Xray Chest 1 View- PORTABLE-Urgent .) (08.24.23 @ 18:23) >    INTERPRETATION:  CLINICAL INFORMATION: New onset coffee-ground emesis.    TECHNIQUE: One view of the chest.    COMPARISON: Radiograph chest 8/19/2023.    FINDINGS:  Extremely limited evaluation secondary to patient positioning.    Enteric tube is present.    IMPRESSION:  On this limited study, an enteric tube is present.    --- End of Report ---          CHANCE BLACKBURN MD; Resident Radiologist  This document has been electronically signed.  JERRI FUNK DO; Attending Radiologist  This document has been electronically signed. Aug 25 2023 10:26AM    < end of copied text >        RESPIRATORY CULTURES:          Studies  Chest X-RAY  CT SCAN Chest   Venous Dopplers: LE:   CT Abdomen  Others

## 2023-09-01 NOTE — PROGRESS NOTE ADULT - SUBJECTIVE AND OBJECTIVE BOX
Patient is a 73y old  Female who presents with a chief complaint of Respiratory distress (01 Sep 2023 15:57)      SUBJECTIVE / OVERNIGHT EVENTS: developed septic shock overnight, now on meropenem and Levophed, s/p trache, consider DC CARDURA    MEDICATIONS  (STANDING):  albuterol/ipratropium for Nebulization 3 milliLiter(s) Nebulizer every 8 hours  atorvastatin 40 milliGRAM(s) Oral at bedtime  chlorhexidine 0.12% Liquid 15 milliLiter(s) Oral Mucosa every 12 hours  chlorhexidine 2% Cloths 1 Application(s) Topical daily  cloNIDine 0.3 milliGRAM(s) Oral every 8 hours  dexMEDEtomidine Infusion 1 MICROgram(s)/kG/Hr (16.6 mL/Hr) IV Continuous <Continuous>  dextrose 5%. 1000 milliLiter(s) (50 mL/Hr) IV Continuous <Continuous>  dextrose 5%. 1000 milliLiter(s) (100 mL/Hr) IV Continuous <Continuous>  dextrose 50% Injectable 12.5 Gram(s) IV Push once  dextrose 50% Injectable 25 Gram(s) IV Push once  dextrose 50% Injectable 25 Gram(s) IV Push once  doxazosin 6 milliGRAM(s) Oral <User Schedule>  glucagon  Injectable 1 milliGRAM(s) IntraMuscular once  insulin lispro (ADMELOG) corrective regimen sliding scale   SubCutaneous every 6 hours  insulin NPH human recombinant 5 Unit(s) SubCutaneous every 6 hours  meropenem  IVPB 500 milliGRAM(s) IV Intermittent every 12 hours  norepinephrine Infusion 0.05 MICROgram(s)/kG/Min (3.12 mL/Hr) IV Continuous <Continuous>  pantoprazole  Injectable 40 milliGRAM(s) IV Push two times a day  predniSONE   Tablet 40 milliGRAM(s) Oral daily  sevelamer carbonate Powder 1600 milliGRAM(s) Oral three times a day  tranexamic acid Injectable for Topical Use 10 milliLiter(s) Topical once    MEDICATIONS  (PRN):  acetaminophen   Oral Liquid .. 650 milliGRAM(s) Oral every 6 hours PRN Temp greater or equal to 38C (100.4F), Mild Pain (1 - 3)  dextrose Oral Gel 15 Gram(s) Oral once PRN Blood Glucose LESS THAN 70 milliGRAM(s)/deciliter      Vital Signs Last 24 Hrs  T(F): 97.5 (23 @ 20:25), Max: 98.8 (23 @ 04:30)  HR: 69 (23 @ 21:00) (51 - 74)  BP: 85/14 (23 @ 06:00) (78/18 - 101/23)  RR: 16 (23 @ 21:00) (12 - 27)  SpO2: 100% (23 @ 21:00) (92% - 100%)  Telemetry:   CAPILLARY BLOOD GLUCOSE      POCT Blood Glucose.: 119 mg/dL (01 Sep 2023 17:35)  POCT Blood Glucose.: 104 mg/dL (01 Sep 2023 11:27)  POCT Blood Glucose.: 184 mg/dL (01 Sep 2023 06:40)  POCT Blood Glucose.: 258 mg/dL (31 Aug 2023 23:37)    I&O's Summary    31 Aug 2023 07:  -  01 Sep 2023 07:00  --------------------------------------------------------  IN: 1365.3 mL / OUT: 685 mL / NET: 680.3 mL    01 Sep 2023 07:01  -  01 Sep 2023 21:40  --------------------------------------------------------  IN: 713.4 mL / OUT: 430 mL / NET: 283.4 mL        PHYSICAL EXAM:  GENERAL: NAD, well-developed  HEAD:  Atraumatic, Normocephalic  EYES: EOMI, PERRLA, conjunctiva and sclera clear  NECK: Supple, No JVD  CHEST/LUNG: Clear to auscultation bilaterally; No wheeze  HEART: Regular rate and rhythm; No murmurs, rubs, or gallops  ABDOMEN: Soft, Nontender, Nondistended; Bowel sounds present  EXTREMITIES:  2+ Peripheral Pulses, No clubbing, cyanosis, or edema  PSYCH: AAOx3  NEUROLOGY: non-focal  SKIN: No rashes or lesions    LABS:                        7.6    10.90 )-----------( 158      ( 01 Sep 2023 14:30 )             23.0     -    134<L>  |  98  |  59<H>  ----------------------------<  260<H>  3.6   |  25  |  2.14<H>    Ca    7.9<L>      01 Sep 2023 00:35  Phos  3.3       Mg     1.60         TPro  4.9<L>  /  Alb  2.0<L>  /  TBili  0.2  /  DBili  x   /  AST  9   /  ALT  10  /  AlkPhos  84      PT/INR - ( 01 Sep 2023 00:35 )   PT: 12.2 sec;   INR: 1.09 ratio         PTT - ( 01 Sep 2023 00:35 )  PTT:23.5 sec      Urinalysis Basic - ( 01 Sep 2023 09:34 )    Color: Yellow / Appearance: Turbid / S.023 / pH: x  Gluc: x / Ketone: Negative mg/dL  / Bili: Negative / Urobili: 1.0 mg/dL   Blood: x / Protein: 300 mg/dL / Nitrite: Negative   Leuk Esterase: Large / RBC: 211 /HPF / WBC 55 /HPF   Sq Epi: x / Non Sq Epi: 1 /HPF / Bacteria: Many /HPF        RADIOLOGY & ADDITIONAL TESTS:    Imaging Personally Reviewed:    Consultant(s) Notes Reviewed:      Care Discussed with Consultants/Other Providers:

## 2023-09-01 NOTE — PROGRESS NOTE ADULT - ASSESSMENT
74 y/o F DM on insulin, HTN, CKD, CHFpEF, breast CA, respiratory arrest and cardiac arrest (2018), CVA with residual weakness, aspiration PNA h/o trache, PEG, both removed presents with respiratory distress requiring intubation. May be volume overload i/s/o CHF/CKD vs decrease respiratory drive (given oxygen at home). Improving mental status however worsening renal function and anuria, concerning for ATN vs AIN.     Neuro   #AMS  #Metabolic encephalopathy   #CVA   Baseline MS AOx3 with short term memory changes per family  - MRI brain 8/17 showed right cerebellar infarct (new) with old left PCA/occipital infarcts, likely embolic in nature - STEPHANIE with no TRINITY thrombus or intracardiac shunts  - Now with mild sedation with Precedex for ventilator synchrony  - Intermittent shaking noted, per family happens at baseline but more pronounced here; EEG without epileptiform activity, per neuro no need for LP  - Restarted aspirin 8/26 - held 8/31 iso bleed    Cardiac   #Hypertension; SBP max 200s ? iso renal dysfunction/overload  - Labetalol 600 TID - will hold off on further increases to prevent bradycardia  - Clonidine increased to 0.3 TID   - Restarted home Cardura; holding home coreg as on labetalol    #Shock  - Resolved     #CHFpEF   - TTE 7/2023 with EF 59%, with severe LVH and diastolic dysfunction   - pro-BNP > 08763, trop 78   - Repeat TTE with EF 60% normal systolic and grade 1 diastolic dysfunction     Pulmonary   #Acute hypercapnic and hypoxemic respiratory failure   - DDx: aspiration vs volume overload vs sedating medication decreasing drive (was given nyquil)   - Has not tolerated multiple CPAP trials, plan for trach when stable    /Renal   #RUSS on CKD  - Ddx: obstructive (willard in, no hydro on POCUS) vs low flow state vs AIN i/s/o cephalosporin use and drug rash   - kidney biopsy to r/o AIN planned 8/22, however family felt procedure too risky and so deferred  - cont empiric prednisone 40mg started 8/18   - Continue to monitor, dose meds per eGFR. avoid nephrotoxic agents  - No HD today  - Strict IOs per nephro; Willard in place for UOP monitoring  - c/w prednisone 40 for 14 days per nephro for AIN, then taper (8/18-9/1 then taper)    # Hyperphosphatemia  - Continue to monitor BMP q12  - Started on renvela powder TID per nephro recs    GI  - Had one episode of coffee ground emesis 8/24, two episodes melena 8/31  - GI consulted, scope planned for today  - Continue PPI IV BID iso recent CGE, will transition to PO post extubation  - NPO for procedure    Endocrine  #T2DM   - ISS  - NPH DC'd while NPO, will restart when tube feeds restarted    ID   Started on empiric vancomycin and aztreonam (cefepime/zosyn allergy? developed rash req prednisone)   Trialed cefepime --> cefazolin (sputum MSSA), developed drug eruption - discontinued   Blood and urine cultures 8/11 demonstrating no growth currently, no clinical signs of infection  - Monitor off antibiotics    Heme/Onc  ppx: heparin q8 hrs - held iso bleed  - S/p PRBCs with appropriate response    Ethics  GOC - Per previous GOC with daughter and , reported that they have not talked about end of life care with the patient. For now, they wanted "everything to be done" including CPR, continuing with mech ventilation/intubation, pressors   74 y/o F DM on insulin, HTN, CKD, CHFpEF, breast CA, respiratory arrest and cardiac arrest (2018), CVA with residual weakness, aspiration PNA h/o trache, PEG, both removed presents with respiratory distress requiring intubation. May be volume overload i/s/o CHF/CKD vs decrease respiratory drive (given oxygen at home). Improving mental status however worsening renal function and anuria, concerning for ATN vs AIN.     Neuro   #AMS  #Metabolic encephalopathy   #CVA   Baseline MS AOx3 with short term memory changes per family  - MRI brain 8/17 showed right cerebellar infarct (new) with old left PCA/occipital infarcts, likely embolic in nature - STEPHANIE with no TRINITY thrombus or intracardiac shunts  - Now with mild sedation with Precedex for ventilator synchrony  - Intermittent shaking noted, per family happens at baseline but more pronounced here; EEG without epileptiform activity, per neuro no need for LP  - Restarted aspirin 8/26 - held 8/31 iso bleed - if stable will restart    Cardiac   Labile blood pressure - ranging between SBP 80s-200s on 8/31  - Labetalol 600 TID - overnight became hypotensive/bradycardic ? iso BB toxicity. Labetalol is renally cleared, consider restarting carvedilol if becomes hypertensive  - Clonidine increased to 0.3 TID - held as on pressors  - Restarted home Cardura - held as on pressors      #CHFpEF   - TTE 7/2023 with EF 59%, with severe LVH and diastolic dysfunction   - pro-BNP > 24121, trop 78   - Repeat TTE with EF 60% normal systolic and grade 1 diastolic dysfunction     Pulmonary   #Acute hypercapnic and hypoxemic respiratory failure   - DDx: aspiration vs volume overload vs sedating medication decreasing drive (was given nyquil)   - Has not tolerated multiple CPAP trials, plan for trach today    /Renal   #RUSS on CKD  - Ddx: obstructive (willard in, no hydro on POCUS) vs low flow state vs AIN i/s/o cephalosporin use and drug rash   - kidney biopsy to r/o AIN planned 8/22, however family felt procedure too risky and so deferred  - cont empiric prednisone 40mg started 8/18   - Continue to monitor, dose meds per eGFR. avoid nephrotoxic agents  - No HD today  - Strict IOs per nephro; Willard in place for UOP monitoring  - c/w prednisone 40 for 14 days per nephro for AIN, then taper (8/18-9/1 then taper)    # Hyperphosphatemia  - Continue to monitor BMP q12  - Started on renvela powder TID per nephro recs    GI  - Had one episode of coffee ground emesis 8/24, two episodes melena 8/31  - GI consulted, scope planned for today  - Continue PPI IV BID iso recent CGE, will transition to PO post extubation  - NPO for procedure    Endocrine  #T2DM   - ISS  - NPH DC'd while NPO, will restart when tube feeds restarted    ID   Started on empiric vancomycin and aztreonam (cefepime/zosyn allergy? developed rash req prednisone)   Trialed cefepime --> cefazolin (sputum MSSA), developed drug eruption - discontinued   UA +, Willard DC'd  Per ID pharmacist, consider meropenem    Heme/Onc  ppx: heparin q8 hrs - held iso bleed, if stable will restart    Ethics  GOC - Per previous GOC with daughter and , reported that they have not talked about end of life care with the patient. For now, they wanted "everything to be done" including CPR, continuing with mech ventilation/intubation, pressors

## 2023-09-01 NOTE — PROGRESS NOTE ADULT - SUBJECTIVE AND OBJECTIVE BOX
Date of Service: 23 @ 11:33    Patient is a 73y old  Female who presents with a chief complaint of Respiratory distress (01 Sep 2023 11:08)      Any change in ROS: seems to be doing ok: however overnight she became hypotensive and is on pressors now as well as became bradycardic:     MEDICATIONS  (STANDING):  albuterol/ipratropium for Nebulization 3 milliLiter(s) Nebulizer every 8 hours  atorvastatin 40 milliGRAM(s) Oral at bedtime  chlorhexidine 0.12% Liquid 15 milliLiter(s) Oral Mucosa every 12 hours  chlorhexidine 2% Cloths 1 Application(s) Topical daily  cisatracurium Injectable 20 milliGRAM(s) IV Push once  cloNIDine 0.3 milliGRAM(s) Oral every 8 hours  dexMEDEtomidine Infusion 1 MICROgram(s)/kG/Hr (16.6 mL/Hr) IV Continuous <Continuous>  dextrose 5%. 1000 milliLiter(s) (50 mL/Hr) IV Continuous <Continuous>  dextrose 5%. 1000 milliLiter(s) (100 mL/Hr) IV Continuous <Continuous>  dextrose 50% Injectable 12.5 Gram(s) IV Push once  dextrose 50% Injectable 25 Gram(s) IV Push once  dextrose 50% Injectable 25 Gram(s) IV Push once  doxazosin 6 milliGRAM(s) Oral <User Schedule>  fentaNYL    Injectable 100 MICROGram(s) IV Push once  fentaNYL    Injectable 100 MICROGram(s) IV Push once  glucagon  Injectable 1 milliGRAM(s) IntraMuscular once  insulin lispro (ADMELOG) corrective regimen sliding scale   SubCutaneous every 6 hours  insulin NPH human recombinant 5 Unit(s) SubCutaneous every 6 hours  labetalol 600 milliGRAM(s) Oral three times a day  midazolam Injectable 5 milliGRAM(s) IV Push once  norepinephrine Infusion 0.05 MICROgram(s)/kG/Min (3.12 mL/Hr) IV Continuous <Continuous>  pantoprazole  Injectable 40 milliGRAM(s) IV Push two times a day  predniSONE   Tablet 40 milliGRAM(s) Oral daily  sevelamer carbonate Powder 1600 milliGRAM(s) Oral three times a day    MEDICATIONS  (PRN):  acetaminophen   Oral Liquid .. 650 milliGRAM(s) Oral every 6 hours PRN Temp greater or equal to 38C (100.4F), Mild Pain (1 - 3)  dextrose Oral Gel 15 Gram(s) Oral once PRN Blood Glucose LESS THAN 70 milliGRAM(s)/deciliter    Vital Signs Last 24 Hrs  T(C): 35.8 (01 Sep 2023 08:00), Max: 37.1 (01 Sep 2023 04:30)  T(F): 96.5 (01 Sep 2023 08:00), Max: 98.8 (01 Sep 2023 04:30)  HR: 67 (01 Sep 2023 11:30) (51 - 72)  BP: 85/14 (01 Sep 2023 06:00) (78/18 - 101/23)  BP(mean): 29 (01 Sep 2023 06:00) (29 - 39)  RR: 14 (01 Sep 2023 11:30) (11 - 25)  SpO2: 100% (01 Sep 2023 11:30) (93% - 100%)    Parameters below as of 01 Sep 2023 11:30  Patient On (Oxygen Delivery Method): ventilator    O2 Concentration (%): 100  Mode: AC/ CMV (Assist Control/ Continuous Mandatory Ventilation)  RR (machine): 12  TV (machine): 360  FiO2: 30  PEEP: 5  ITime: 0.86  MAP: 11  PIP: 40    I&O's Summary    31 Aug 2023 07:  -  01 Sep 2023 07:00  --------------------------------------------------------  IN: 1365.3 mL / OUT: 685 mL / NET: 680.3 mL    01 Sep 2023 07:01  -  01 Sep 2023 11:33  --------------------------------------------------------  IN: 84.9 mL / OUT: 10 mL / NET: 74.9 mL          Physical Exam:   GENERAL: NAD, well-groomed, well-developed  HEENT: MANDY/   Atraumatic, Normocephalic  ENMT: No tonsillar erythema, exudates, or enlargement; Moist mucous membranes, Good dentition, No lesions  NECK: Supple, No JVD, Normal thyroid  CHEST/LUNG: Clear to auscultaion- no wheezing  CVS: Regular rate and rhythm; No murmurs, rubs, or gallops  GI: : Soft, Nontender, Nondistended; Bowel sounds present  NERVOUS SYSTEM:  sedated and intubated  EXTREMITIES: + edema  LYMPH: No lymphadenopathy noted  SKIN: No rashes or lesions  ENDOCRINOLOGY: No Thyromegaly  PSYCH: sedated    Labs:  ABG - ( 01 Sep 2023 00:35 )  pH, Arterial: 7.34  pH, Blood: x     /  pCO2: 46    /  pO2: 90    / HCO3: 25    / Base Excess: -1.0  /  SaO2: 98.6                                        7.4    5.95  )-----------( 146      ( 01 Sep 2023 05:30 )             22.8                         7.2    6.47  )-----------( 145      ( 01 Sep 2023 00:35 )             22.0                         7.5    8.52  )-----------( 145      ( 31 Aug 2023 13:00 )             22.9                         7.5    8.96  )-----------( 158      ( 31 Aug 2023 06:00 )             22.5                         7.4    10.85 )-----------( 162      ( 31 Aug 2023 03:00 )             22.4                         6.2    10.91 )-----------( 173      ( 31 Aug 2023 00:15 )             19.0                         8.0    11.27 )-----------( 229      ( 30 Aug 2023 00:05 )             24.6                         8.7    13.01 )-----------( 254      ( 29 Aug 2023 01:10 )             27.4     09-    134<L>  |  98  |  59<H>  ----------------------------<  260<H>  3.6   |  25  |  2.14<H>      135  |  98  |  42<H>  ----------------------------<  348<H>  3.9   |  24  |  1.75<H>  08    135  |  97<L>  |  57<H>  ----------------------------<  402<H>  3.3<L>   |  23  |  2.29<H>  08    134<L>  |  96<L>  |  45<H>  ----------------------------<  255<H>  3.5   |  24  |  1.98<H>    Ca    7.9<L>      01 Sep 2023 00:35  Ca    8.0<L>      31 Aug 2023 00:15  Phos  3.3       Phos  2.4       Mg     1.60       Mg     1.70         TPro  4.9<L>  /  Alb  2.0<L>  /  TBili  0.2  /  DBili  x   /  AST  9   /  ALT  10  /  AlkPhos  84    TPro  5.0<L>  /  Alb  2.2<L>  /  TBili  0.2  /  DBili  x   /  AST  12  /  ALT  11  /  AlkPhos  94    TPro  5.6<L>  /  Alb  2.5<L>  /  TBili  0.2  /  DBili  x   /  AST  9   /  ALT  10  /  AlkPhos  123<H>    TPro  6.0  /  Alb  2.7<L>  /  TBili  0.3  /  DBili  x   /  AST  17  /  ALT  15  /  AlkPhos  113      CAPILLARY BLOOD GLUCOSE      POCT Blood Glucose.: 104 mg/dL (01 Sep 2023 11:27)  POCT Blood Glucose.: 184 mg/dL (01 Sep 2023 06:40)  POCT Blood Glucose.: 258 mg/dL (31 Aug 2023 23:37)  POCT Blood Glucose.: 215 mg/dL (31 Aug 2023 17:22)  POCT Blood Glucose.: 345 mg/dL (31 Aug 2023 12:54)      LIVER FUNCTIONS - ( 01 Sep 2023 00:35 )  Alb: 2.0 g/dL / Pro: 4.9 g/dL / ALK PHOS: 84 U/L / ALT: 10 U/L / AST: 9 U/L / GGT: x           PT/INR - ( 01 Sep 2023 00:35 )   PT: 12.2 sec;   INR: 1.09 ratio         PTT - ( 01 Sep 2023 00:35 )  PTT:23.5 sec  Urinalysis Basic - ( 01 Sep 2023 09:34 )    Color: Yellow / Appearance: Turbid / S.023 / pH: x  Gluc: x / Ketone: Negative mg/dL  / Bili: Negative / Urobili: 1.0 mg/dL   Blood: x / Protein: 300 mg/dL / Nitrite: Negative   Leuk Esterase: Large / RBC: 211 /HPF / WBC 55 /HPF   Sq Epi: x / Non Sq Epi: 1 /HPF / Bacteria: Many /HPF      < from: Xray Chest 1 View-PORTABLE IMMEDIATE (Xray Chest 1 View-PORTABLE IMMEDIATE .) (23 @ 15:14) >    ACC: [id number] EXAM:  XR CHEST PORTABLE IMMED 1V   ORDERED BY: SOPHY MENENDEZ     PROCEDURE DATE:  2023          INTERPRETATION:  CLINICAL INFORMATION: Orogastric tube placement    TIME OF EXAMINATION:  at 1:55 PM    EXAM: Portable chest    FINDINGS:  Enteric tube is seen coursing down the esophagus with its sidehole and   tip in the body of the stomach.    Lungs show no interval change.    Endotracheal tube, loop recorder and hemodialysis catheter are seen.        COMPARISON:         IMPRESSION: Orogastric tube in place.    --- End of Report ---            LIZA WOODY MD; Attending Radiologist  This document has been electronically signed. Aug 31 2023  3:32PM    < end of copied text >  < from: Xray Chest 1 View- PORTABLE-Urgent (Xray Chest 1 View- PORTABLE-Urgent .) (23 @ 18:23) >  PROCEDURE DATE:  2023          INTERPRETATION:  CLINICAL INFORMATION: New onset coffee-ground emesis.    TECHNIQUE: One view of the chest.    COMPARISON: Radiograph chest 2023.    FINDINGS:  Extremely limited evaluation secondary to patient positioning.    Enteric tube is present.    IMPRESSION:  On this limited study, an enteric tube is present.    --- End of Report ---          CHANCE BLACKBURN MD; Resident Radiologist  This document has been electronically signed.  JERRI FUNK DO; Attending Radiologist  This document has been electronically signed. Aug 25 2023 10:26AM    < end of copied text >        RECENT CULTURES:        RESPIRATORY CULTURES:          Studies  Chest X-RAY  CT SCAN Chest   Venous Dopplers: LE:   CT Abdomen  Others

## 2023-09-01 NOTE — PROGRESS NOTE ADULT - ASSESSMENT
74 y/o F well [facility] to me [facility]. she was admitted at [facility] 7/12-7/22 w aspiration PNA, was treated w CEFEPIME, developed an allergic rash,  dCHF, + MAC on AFB culture, had been progressively getting more and more lethargic and dyspneic at home since DC.   In  am of 8/11/23  ptn presented with respiratory distress w hypoxia and hypercarbia requiring intubation 2/2 volume overload +/- Asp PNA      Neuro   responds appropriately   Baseline MS AOx3, aphasic   - h/o CVA , on aspirin and statin . 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 probably embolic in nature, did not tolerate full AC in the past, STEPHANIE is neg , no shunts observed      Cardiac   Ptn well known to Dr. Dunn, consult reviewed   CHFpEF   TTE 7/2023 with EF 59%, with severe LVH and diastolic dysfunction   septic shock overnight, now on meropenem and Levophed, s/p trache, consider DC CARDURA    Pulmonary   Acute hypercapnic and hypoxemic respiratory failure   well known to Dr. Mcnulty, consult reviewed   developed septic shock overnight, now on meropenem and Levophed, s/p trache, consider DC CARDURA      Renal   Hyperkalemia with EKG changes  , initially treated w LOKELMA,  now resolved   Ptn well know to Dr. Colon, consult reviewed      worsening renal function, oliguric, fluid overloaded, on Bumex drip, worsening azotemia, may need HD, renal following. BP elevated, on labetalol cardene, clonidine, on empiric steroids for AIN  through 9/1, start tapering thereafter   HD 8/19,  8/21, 8/26 and 8/28.  s/p PUF 8/29 2.5 Liters, HD 8/30,  9/1  cont HD as per renal  on bumex  drip , but renal recommends to DC  renal biopsy has been deferred , ASA  resumed, AC on hold    GI  NPO for now, on tube feeds  coffee ground emesis x 1 8/24, melena overnight, s/p 1UPRBC, EGD/COlonoscopy: erosive gastropathy, esophagisits, on colonoscopy old blood seen no active bleeding, poor prep    Endocrine  T2DM   A1C 7.1 in 7/2023   - BG goal 140-200     ID   No fever/leukocytosis, recheck temp   Hx latent TB which was treated, no concern for TB   - grew MAC on AFB culture from most recent admission. would call ID consult  Started on empiric vancomycin and aztreonam,  changed to cefepime 8/12, cefepime dced on 8/13(cefepime/zosyn allergy.  developed rash when on cefepime on previous admission ) . started on AVALOX on 8/13, off ABx on 8/14. ptn has an allergic rash, prob 2/2 cefepime  - f/u MRSA   - all cxs NTD    Heme/Onc  ppx: heparin q8 hrs     Ethics  GOC - Discussed GOC with daughter and , they have opted in the past for full code. and she remains full code at present

## 2023-09-01 NOTE — CONSULT NOTE ADULT - SUBJECTIVE AND OBJECTIVE BOX
CHIEF COMPLAINT:Patient is a 73y old  Female who presents with a chief complaint of Respiratory distress (01 Sep 2023 14:46)      HPI:  74 y/o F DM on insulin, HTN, CKD,breast CA, respiratory arrest and cardiac arrest (2018), CVA with residual weakness, aspiration PNA h/o trache, PEG, both removed presents with respiratory distress requiring intubation. Had recent admission d/c 23 for cough and respiratory distress (no intubation) thought to be i/s/o aspiration PNA s/p cefepime course; developed rash in response. Infectious w/u was negative at this time. Was discharged home, daughter and  at bedside providing history.     Reports that she was initially improving with O2 sats in the high 90s on room air, however, then became more lethargic and not herself (baseline mental status AOx3). Also had worsening shortness of breath while laying down and leg swelling. Contacted cardiologist who started her on bumex 1mg daily. Developed low sugars overnight before admission and was given juice with some food, daughter reports no coughing during episode. At around 4-5 AM developed vomiting and coughing, O2 sats dropped to 80s and EMS was called. Denies fevers/chills, dysuria or urinary frequency, chest pain, palpitations. Uses wheelchair at home however family moves her around frequently.     In ED, was on BiPAP with increased WOB and was intubated. Found to have elevated pro-BNP and CO2 of 111 on VBG. CXR with small right pleural effusion, started on vanc in the ED.  (11 Aug 2023 09:08)    Admitted to MICU for acute hypoxemic and hypercapnic respiratory failure requiring intubation in setting of RUSS and acute pulmonary edema, possible ATN vs AIN, now with prolonged mechanical ventilation, IP was consulted for percutaneous tracheostomy placement    PAST MEDICAL & SURGICAL HISTORY:  Breast CA      Diabetes      Stroke      Cardiac arrest      HTN (hypertension)      H/O mastectomy, bilateral          FAMILY HISTORY:  No pertinent family history in first degree relative    Allergies    isoniazid (Rash)  nafcillin (Unknown)  hydrALAZINE (Rash)  vitamin E (Short breath; Urticaria; Hives)  doxycycline (Rash)  cefepime (Rash)  NIFEdipine (Urticaria; Hives)    Intolerances        HOME MEDICATIONS:  Home Medications:  aspirin 81 mg oral delayed release tablet: 1 tab(s) orally once a day (2023 20:09)  atorvastatin 10 mg oral tablet: 1 tab(s) orally once a day (2023 20:13)  budesonide 0.5 mg/2 mL inhalation suspension: 2 milliliter(s) by nebulizer once a day (2023 20:13)  carvedilol 12.5 mg oral tablet: 3 tab(s) orally 2 times a day (2023 20:13)  cholecalciferol 50 mcg (2000 intl units) oral tablet: 1 tab(s) orally once a day (2023 20:13)  donepezil 10 mg oral tablet: 1 tab(s) orally once a day (at bedtime) (2023 20:13)  Incruse Ellipta 62.5 mcg/inh inhalation powder: 1 inhaled (18 Aug 2023 18:49)  ipratropium 42 mcg/inh (0.06%) nasal spray: 1 spray(s) intranasally 2 times a day (2023 20:13)  Januvia 25 mg oral tablet: 1 tab(s) orally once a day (2023 20:13)  letrozole 2.5 mg oral tablet: 1 tab(s) orally once a day (2023 20:13)  PreserVision AREDS 2 oral capsule: 1 cap(s) orally once a day (2023 20:09)  sodium chloride 0.9% inhalation solution: 3 milliliter(s) inhaled every 6 hours as needed for  shortness of breath and/or wheezing (2023 12:09)  Victoza 18 mg/3 mL subcutaneous solution: 1.2 subcutaneously (18 Aug 2023 18:50)      REVIEW OF SYSTEMS:  [x ] Unable to assess ROS because intubated/sedated    OBJECTIVE:  ICU Vital Signs Last 24 Hrs  T(C): 35.8 (01 Sep 2023 08:00), Max: 37.1 (01 Sep 2023 04:30)  T(F): 96.5 (01 Sep 2023 08:00), Max: 98.8 (01 Sep 2023 04:30)  HR: 60 (01 Sep 2023 15:21) (51 - 74)  BP: 85/14 (01 Sep 2023 06:00) (78/18 - 101/23)  BP(mean): 29 (01 Sep 2023 06:00) (29 - 39)  ABP: 145/33 (01 Sep 2023 15:00) (93/21 - 186/48)  ABP(mean): 70 (01 Sep 2023 15:00) (41 - 97)  RR: 18 (01 Sep 2023 15:00) (12 - 27)  SpO2: 100% (01 Sep 2023 15:21) (92% - 100%)    O2 Parameters below as of 01 Sep 2023 15:17  Patient On (Oxygen Delivery Method): ventilator          Mode: AC/ CMV (Assist Control/ Continuous Mandatory Ventilation), RR (machine): 12, TV (machine): 360, FiO2: 50, PEEP: 5, ITime: 0.86, MAP: 9, PIP: 39    08- @ : @ 07:00  --------------------------------------------------------  IN: 1365.3 mL / OUT: 685 mL / NET: 680.3 mL     @ 07: @ 15:57  --------------------------------------------------------  IN: 197.4 mL / OUT: 30 mL / NET: 167.4 mL      CAPILLARY BLOOD GLUCOSE      POCT Blood Glucose.: 104 mg/dL (01 Sep 2023 11:27)      PHYSICAL EXAM:  Gen: intubated/sedated  Neck: previous trach scar  CV: regular  Lung: mechanical bs  Abd: Soft  Ext: WWP  Skin: No rash  Neuro: sedated    HOSPITAL MEDICATIONS:  Standing Meds:  albuterol/ipratropium for Nebulization 3 milliLiter(s) Nebulizer every 8 hours  atorvastatin 40 milliGRAM(s) Oral at bedtime  chlorhexidine 0.12% Liquid 15 milliLiter(s) Oral Mucosa every 12 hours  chlorhexidine 2% Cloths 1 Application(s) Topical daily  cloNIDine 0.3 milliGRAM(s) Oral every 8 hours  dexMEDEtomidine Infusion 1 MICROgram(s)/kG/Hr IV Continuous <Continuous>  dextrose 5%. 1000 milliLiter(s) IV Continuous <Continuous>  dextrose 5%. 1000 milliLiter(s) IV Continuous <Continuous>  dextrose 50% Injectable 12.5 Gram(s) IV Push once  dextrose 50% Injectable 25 Gram(s) IV Push once  dextrose 50% Injectable 25 Gram(s) IV Push once  doxazosin 6 milliGRAM(s) Oral <User Schedule>  fentaNYL    Injectable 100 MICROGram(s) IV Push once  glucagon  Injectable 1 milliGRAM(s) IntraMuscular once  insulin lispro (ADMELOG) corrective regimen sliding scale   SubCutaneous every 6 hours  insulin NPH human recombinant 5 Unit(s) SubCutaneous every 6 hours  meropenem  IVPB 500 milliGRAM(s) IV Intermittent every 12 hours  norepinephrine Infusion 0.05 MICROgram(s)/kG/Min IV Continuous <Continuous>  pantoprazole  Injectable 40 milliGRAM(s) IV Push two times a day  predniSONE   Tablet 40 milliGRAM(s) Oral daily  sevelamer carbonate Powder 1600 milliGRAM(s) Oral three times a day  tranexamic acid Injectable for Topical Use 10 milliLiter(s) Topical once      PRN Meds:  acetaminophen   Oral Liquid .. 650 milliGRAM(s) Oral every 6 hours PRN  dextrose Oral Gel 15 Gram(s) Oral once PRN      LABS:    The Labs were reviewed by me   The Radiology was reviewed by me    EKG tracing reviewed by me        134<L>  |  98  |  59<H>  ----------------------------<  260<H>  3.6   |  25  |  2.14<H>      135  |  98  |  42<H>  ----------------------------<  348<H>  3.9   |  24  |  1.75<H>      135  |  97<L>  |  57<H>  ----------------------------<  402<H>  3.3<L>   |  23  |  2.29<H>    Ca    7.9<L>      01 Sep 2023 00:35  Ca    8.0<L>      31 Aug 2023 00:15  Ca    8.2<L>      30 Aug 2023 00:05  Phos  3.3       Mg     1.60         TPro  4.9<L>  /  Alb  2.0<L>  /  TBili  0.2  /  DBili  x   /  AST  9   /  ALT  10  /  AlkPhos  84    TPro  5.0<L>  /  Alb  2.2<L>  /  TBili  0.2  /  DBili  x   /  AST  12  /  ALT  11  /  AlkPhos  94    TPro  5.6<L>  /  Alb  2.5<L>  /  TBili  0.2  /  DBili  x   /  AST  9   /  ALT  10  /  AlkPhos  123<H>      Magnesium: 1.60 mg/dL (23 @ 00:35)  Magnesium: 1.70 mg/dL (23 @ 00:15)  Magnesium: 1.80 mg/dL (23 @ 00:05)    Phosphorus: 3.3 mg/dL (23 @ 00:35)  Phosphorus: 2.4 mg/dL (23 @ 00:15)  Phosphorus: 4.1 mg/dL (23 @ 00:05)      PT/INR - ( 01 Sep 2023 00:35 )   PT: 12.2 sec;   INR: 1.09 ratio         PTT - ( 01 Sep 2023 00:35 )  PTT:23.5 sec              Urinalysis Basic - ( 01 Sep 2023 09:34 )    Color: Yellow / Appearance: Turbid / S.023 / pH: x  Gluc: x / Ketone: Negative mg/dL  / Bili: Negative / Urobili: 1.0 mg/dL   Blood: x / Protein: 300 mg/dL / Nitrite: Negative   Leuk Esterase: Large / RBC: 211 /HPF / WBC 55 /HPF   Sq Epi: x / Non Sq Epi: 1 /HPF / Bacteria: Many /HPF      ABG - ( 01 Sep 2023 00:35 )  pH, Arterial: 7.34  pH, Blood: x     /  pCO2: 46    /  pO2: 90    / HCO3: 25    / Base Excess: -1.0  /  SaO2: 98.6                                    7.6    10.90 )-----------( 158      ( 01 Sep 2023 14:30 )             23.0                         7.4    5.95  )-----------( 146      ( 01 Sep 2023 05:30 )             22.8                         7.2    6.47  )-----------( 145      ( 01 Sep 2023 00:35 )             22.0     CAPILLARY BLOOD GLUCOSE      POCT Blood Glucose.: 104 mg/dL (01 Sep 2023 11:27)  POCT Blood Glucose.: 184 mg/dL (01 Sep 2023 06:40)  POCT Blood Glucose.: 258 mg/dL (31 Aug 2023 23:37)  POCT Blood Glucose.: 215 mg/dL (31 Aug 2023 17:22)        MICROBIOLOGY:       RADIOLOGY:  [ ] Reviewed and interpreted by me    PULMONARY FUNCTION TESTS:    EKG:

## 2023-09-01 NOTE — PROGRESS NOTE ADULT - ASSESSMENT
72 y/o F DM on insulin, HTN, CKD, CHFpEF, breast CA, respiratory arrest and cardiac arrest (2018), CVA with residual weakness, aspiration PNA h/o trache, PEG, both removed presents with respiratory distress requiring intubation. Found to have elevated BNP, may be 2/2 to volume overload i/s/o CKD vs CHF.     GI BLEED:  -now had gi bleed : hb dropped:   -s/p one unit of PRBC transfusion:   -on ppi and steroids too'; :  -for endoscopy now today     Neuro ;  - Sedated : Baseline MS AOx3   -WAS ON PROPOFOL AND FENTANYL BEFORE: NOW OFF:    - Monitor her mental status:  requiring only 30% fio2:    -8/15: - now she has been off sedation for more then 24 hours but is still lethargic: her o2 requirement has not increased:   -for possible extubation if she wakes up   :: -la nena PMD: pt is still lethargic  for MRI brain stem today   ": -had ct head: OK: awaiting MRI brain : still lethargic  : seemed same to me: family at bedside who says she is little  bit more awake: oxygen requirement is same:  at 30%:off vasopressors   -now neuro note reviewed:  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 reason for inability ot trigger vent and co2 retention initially ? candidate for trach    -now the co2 i s normal : not much improvement in her mental statis:  off sedation for days   /8/20: still lethargic: on precedex;  cxr reviewed:  last time at [facility] she was found to have cavity in RUL : she was ruled out for tb : her AFB was positive but was MAC /; defer to MICU   "  still sedated:  no sob:  on 30% fio2:  on precedex:  and is on nicardipine as well as labetalol ; for renal biopsy today  : seems OK: more alert and awake:  renal biopsy is still pending:  la nena family   : doing OK: alert and awake:  but still intubated:  getting cpap : no renal biopsy done   : notable to be weaned:  probably trach next week if she is not extubated by early next week : restarted on bumex  : doing same on cpap trials at this time:  cont iv diuresis per renal ; on predniosne for renal issues:   : seems OK: no sob:  no cugh : no phlegm : on cpap trial: ? extubate L vs trach    : she is hypercarbic acidotic today while being on cpap trials at 8  : the talk is ongoing for extubation trial or trach  : family seems  interested in reintubation in the event if she is extubated:   : pts family is still vacillating between trach or not/;  dw  in detail at bedside dw MICU ACP  : no finally no extubation  pt will go for trach in am : dw family   : remains same: now is awaiting trach: on 30% fio2: and is on full vent support   2023: seems OK: but overnight hypotensive and bradycardic now on pressors:  now trach is postponed:   CVA   - cont aspirin and statin   //-per neurology   : sedated  neuro followin/23:edation:  seems to be doing better:  plan for extubation if family refused for biopsy   : more awake and alert :   : stable  : she is awake but is lethargic  : she is little bit more alert today  :   : mentally she is doing same:  still lethargic but open eyes and respond to questions  : she seems lethargic today  : o n fuill vent support   : sedated on full vent support  : on precedx:  cont full vent support now awaiting trach    91: trach cancelled secondary to pt become hypotensive   Cardiac   -CHFpEF : TTE 2023 with EF 59%, with severe LVH and diastolic dysfunction : pro-BNP > 44615, trop 78   -on bumex drip :  -cards following   8/15: : she is off bumex drip and is on midodrine   : remains off bumex  :: off bumex:  defer to MICU team   cont to remain off diuretics   : she seems same to me: in renal failure off diuretics   : per Micu   : seems stable  : back  on bumex drip    : cont bumex drip    : diuretics per renal   : she is due for hd today  : dw renal : removing 3 L of fluid today  :   : on bumex drip : HD per renal   : per re nal    : off diuretics:  on hd intermittently:  defer to renal    Pulmonary   -Acute hypercapnic and hypoxemic respiratory failure   -DDx: aspiration vs volume overload  VBG with respiratory acidosis pCO2 111  - CXR with small right pleural effusion, no consolidations/opacities  -now she seems better:  fio2: 30$:  -? etiology of hypercarbia:  multifactorial : seems to have OHS and TYRONE superimposed by acute chf  ? and aspiration pneumonia:"   -pt is mostly bed bound:   -it is possible that this time:  she mght need bipap on dc : atleast at the night time:   -on 8/15:  still remains intubated  :still lethargic:  not extubated hypercarbic acidotic today on abg:  weaning trials :probably would need bipap following extubation  : :still lethargic: :awaiting mri brain : dw    : :MRI done has new right cerebellar infarct on mri : defer to MICU   -seen by bora now:  has brain stem infarct to per neuro note:  reason for being vent;    : awaiting renal bipsy today  : and then aggressive weaning  : now no renal biopsy :    : still sightly hypoxic but good sao2:  cont to wean and extubate if needed  still has AMS   :? extubation vs trach   : being weaned: plan as above   : she is on full vent support:  weaning as tolerated:  but now seems heading towards trach   : now for trach ellen m    : awaiting trach : o2 requirement has not increased:   : on vasopressors: cont supportive care  /Renal   -Hyperkalemia with EKG changes  : 7.2, hemolyzed, given insulin and D50 + calcium gluc   -K is better today : cont to monitor  -normal today on 8/15   8/16:stable  :-k has been ok for l ast few days  : stable  : started on prednisone yesterday for suspected AIN by renal    : wbc is high : likely secondary to steroids  she has no fveR:  ruled out for tb last time   : for renal biopsy today   : RENAL  BIOPSY NOT DONE:  FAMILY IS THINKING about itl   : biopsy not done: urine output increased:   : doing  ok : no sob:  no biopsy for now:   : per primary pulm tea in MICU   : on cpap trial: hypercarbic today  :   : on full vent suport: uniley that she will wean and remains extubated for long time:   ? trach    : HD per renal   : HD renal   Endocrine  T2DM : A1C 7.1 in 2023   -cont to monitor blood glucose  ID   - No fever/leukocytosis  - Hx latent TB which was treated, no concern for TB  : She was recently ruled out for tuberculosis at [facility]   - Started on empiric vancomycin and aztreonam (cefepime/zosyn allergy? developed rash req prednisone)  : now dced:   defer to MICU   - f/u MRSA   - follow up blood culture/urine   :blood cultures negative so far: legionella is negative:  remains off antibiotics  staph aureus on sputum   :: off antibiotics at this time: secondary to drug eruption  : remains off antibiotics   -: off diuretics   : off antibiotics   : off antibiotics   : off antibiotics   : off antibtiocs  : remains afebrile:   : off antibiotics  :  remains off antibiotics    dw micu and  at bedside   overall prognosis seems guarded

## 2023-09-01 NOTE — PROGRESS NOTE ADULT - ATTENDING COMMENTS
Underwent endoscopic evaluation yesterday with EGD and colonoscopy. EGD with esophagitis and gastropathy, but no evidence of active bleeding; suspect esophagitis likely etiology of prior coffee ground emesis. Colonoscopy with old blood found throughout the colon and examined TI; unclear if old blood in TI was refluxed proximally in setting of enema prep vs if small bowel bleed. No obvious underlying lesions identified. No further overt bleeding reported post-procedure. Hgb has remained stable after pRBC transfusion yesterday morning for Hgb 6.2. Suggest high dose PPI therapy for erosive esophagitis as noted above. If recurrent overt bleeding, suggest imaging to assist with localization and possible IR consultation for embolization if positive. Additional recommendations as above.

## 2023-09-01 NOTE — PROGRESS NOTE ADULT - SUBJECTIVE AND OBJECTIVE BOX
Interval Events:   Patient remains critically ill in the ICU.  Hypotensive overnight however no overt signs of GI bleeding.    ROS:   Unable to fully obtain ROS.    Hospital Medications:  acetaminophen   Oral Liquid .. 650 milliGRAM(s) Oral every 6 hours PRN  albuterol/ipratropium for Nebulization 3 milliLiter(s) Nebulizer every 8 hours  atorvastatin 40 milliGRAM(s) Oral at bedtime  chlorhexidine 0.12% Liquid 15 milliLiter(s) Oral Mucosa every 12 hours  chlorhexidine 2% Cloths 1 Application(s) Topical daily  cisatracurium Injectable 20 milliGRAM(s) IV Push once  cloNIDine 0.3 milliGRAM(s) Oral every 8 hours  dexMEDEtomidine Infusion 1 MICROgram(s)/kG/Hr IV Continuous <Continuous>  dextrose 5%. 1000 milliLiter(s) IV Continuous <Continuous>  dextrose 5%. 1000 milliLiter(s) IV Continuous <Continuous>  dextrose 50% Injectable 12.5 Gram(s) IV Push once  dextrose 50% Injectable 25 Gram(s) IV Push once  dextrose 50% Injectable 25 Gram(s) IV Push once  dextrose Oral Gel 15 Gram(s) Oral once PRN  doxazosin 6 milliGRAM(s) Oral <User Schedule>  fentaNYL    Injectable 100 MICROGram(s) IV Push once  fentaNYL    Injectable 100 MICROGram(s) IV Push once  glucagon  Injectable 1 milliGRAM(s) IntraMuscular once  insulin lispro (ADMELOG) corrective regimen sliding scale   SubCutaneous every 6 hours  insulin NPH human recombinant 5 Unit(s) SubCutaneous every 6 hours  labetalol 600 milliGRAM(s) Oral three times a day  midazolam Injectable 5 milliGRAM(s) IV Push once  norepinephrine Infusion 0.05 MICROgram(s)/kG/Min IV Continuous <Continuous>  pantoprazole  Injectable 40 milliGRAM(s) IV Push two times a day  predniSONE   Tablet 40 milliGRAM(s) Oral daily  sevelamer carbonate Powder 1600 milliGRAM(s) Oral three times a day      PHYSICAL EXAM:   Vital Signs:  Vital Signs Last 24 Hrs  T(C): 35.8 (01 Sep 2023 08:00), Max: 37.1 (01 Sep 2023 04:30)  T(F): 96.5 (01 Sep 2023 08:00), Max: 98.8 (01 Sep 2023 04:30)  HR: 64 (01 Sep 2023 10:29) (51 - 72)  BP: 85/14 (01 Sep 2023 06:00) (78/18 - 101/23)  BP(mean): 29 (01 Sep 2023 06:00) (29 - 39)  RR: 19 (01 Sep 2023 10:00) (11 - 25)  SpO2: 95% (01 Sep 2023 10:29) (93% - 100%)    Parameters below as of 01 Sep 2023 10:00  Patient On (Oxygen Delivery Method): ventilator    O2 Concentration (%): 30  Daily     Daily Weight in k.4 (01 Sep 2023 04:30)    GENERAL: critically ill  NEURO: not fully alert/oriented  HEENT: NCAT, no conjunctival pallor appreciated  CHEST: intubated, mechanical breath sounds  CARDIAC: regular rate, +S1/S2  ABDOMEN: soft, nontender, no rebound or guarding  EXTREMITIES: warm, well perfused  SKIN: no lesions noted    LABS: reviewed                        7.4    5.95  )-----------( 146      ( 01 Sep 2023 05:30 )             22.8     09-01    134<L>  |  98  |  59<H>  ----------------------------<  260<H>  3.6   |  25  |  2.14<H>    Ca    7.9<L>      01 Sep 2023 00:35  Phos  3.3     09-  Mg     1.60     09-    TPro  4.9<L>  /  Alb  2.0<L>  /  TBili  0.2  /  DBili  x   /  AST  9   /  ALT  10  /  AlkPhos  84  09-01    LIVER FUNCTIONS - ( 01 Sep 2023 00:35 )  Alb: 2.0 g/dL / Pro: 4.9 g/dL / ALK PHOS: 84 U/L / ALT: 10 U/L / AST: 9 U/L / GGT: x             Interval Diagnostic Studies: see sunrise for full report   Interval Events:   Patient remains critically ill in the ICU.  Hypotensive overnight however no overt signs of GI bleeding.    ROS:   Unable to fully obtain ROS.    Hospital Medications:  acetaminophen   Oral Liquid .. 650 milliGRAM(s) Oral every 6 hours PRN  albuterol/ipratropium for Nebulization 3 milliLiter(s) Nebulizer every 8 hours  atorvastatin 40 milliGRAM(s) Oral at bedtime  chlorhexidine 0.12% Liquid 15 milliLiter(s) Oral Mucosa every 12 hours  chlorhexidine 2% Cloths 1 Application(s) Topical daily  cisatracurium Injectable 20 milliGRAM(s) IV Push once  cloNIDine 0.3 milliGRAM(s) Oral every 8 hours  dexMEDEtomidine Infusion 1 MICROgram(s)/kG/Hr IV Continuous <Continuous>  dextrose 5%. 1000 milliLiter(s) IV Continuous <Continuous>  dextrose 5%. 1000 milliLiter(s) IV Continuous <Continuous>  dextrose 50% Injectable 12.5 Gram(s) IV Push once  dextrose 50% Injectable 25 Gram(s) IV Push once  dextrose 50% Injectable 25 Gram(s) IV Push once  dextrose Oral Gel 15 Gram(s) Oral once PRN  doxazosin 6 milliGRAM(s) Oral <User Schedule>  fentaNYL    Injectable 100 MICROGram(s) IV Push once  fentaNYL    Injectable 100 MICROGram(s) IV Push once  glucagon  Injectable 1 milliGRAM(s) IntraMuscular once  insulin lispro (ADMELOG) corrective regimen sliding scale   SubCutaneous every 6 hours  insulin NPH human recombinant 5 Unit(s) SubCutaneous every 6 hours  labetalol 600 milliGRAM(s) Oral three times a day  midazolam Injectable 5 milliGRAM(s) IV Push once  norepinephrine Infusion 0.05 MICROgram(s)/kG/Min IV Continuous <Continuous>  pantoprazole  Injectable 40 milliGRAM(s) IV Push two times a day  predniSONE   Tablet 40 milliGRAM(s) Oral daily  sevelamer carbonate Powder 1600 milliGRAM(s) Oral three times a day      PHYSICAL EXAM:   Vital Signs:  Vital Signs Last 24 Hrs  T(C): 35.8 (01 Sep 2023 08:00), Max: 37.1 (01 Sep 2023 04:30)  T(F): 96.5 (01 Sep 2023 08:00), Max: 98.8 (01 Sep 2023 04:30)  HR: 64 (01 Sep 2023 10:29) (51 - 72)  BP: 85/14 (01 Sep 2023 06:00) (78/18 - 101/23)  BP(mean): 29 (01 Sep 2023 06:00) (29 - 39)  RR: 19 (01 Sep 2023 10:00) (11 - 25)  SpO2: 95% (01 Sep 2023 10:29) (93% - 100%)    Parameters below as of 01 Sep 2023 10:00  Patient On (Oxygen Delivery Method): ventilator    O2 Concentration (%): 30  Daily     Daily Weight in k.4 (01 Sep 2023 04:30)    GENERAL: critically ill  NEURO: not fully alert/oriented  HEENT: NCAT, no conjunctival pallor appreciated  CHEST: intubated, mechanical breath sounds  CARDIAC: regular rate, +S1/S2  ABDOMEN: soft, nontender, no rebound or guarding  EXTREMITIES: warm, well perfused  SKIN: no lesions noted    LABS: reviewed                        7.4    5.95  )-----------( 146      ( 01 Sep 2023 05:30 )             22.8     09-01    134<L>  |  98  |  59<H>  ----------------------------<  260<H>  3.6   |  25  |  2.14<H>    Ca    7.9<L>      01 Sep 2023 00:35  Phos  3.3     -  Mg     1.60         TPro  4.9<L>  /  Alb  2.0<L>  /  TBili  0.2  /  DBili  x   /  AST  9   /  ALT  10  /  AlkPhos  84  0901    LIVER FUNCTIONS - ( 01 Sep 2023 00:35 )  Alb: 2.0 g/dL / Pro: 4.9 g/dL / ALK PHOS: 84 U/L / ALT: 10 U/L / AST: 9 U/L / GGT: x

## 2023-09-01 NOTE — PROGRESS NOTE ADULT - SUBJECTIVE AND OBJECTIVE BOX
Overnight events noted - hypotensive/shock - now off antihypertensives and on a Levophed gtt  Remains intubated/sedated on Precedex      VITAL:  T(C): , Max: 37.1 (23 @ 04:30)  T(F): , Max: 98.8 (23 @ 04:30)  HR: 54 (23 @ 13:00)  BP: 85/14 (23 @ 06:00)  BP(mean): 29 (23 @ 06:00)  RR: 12 (23 @ 13:00)  SpO2: 100% (23 @ 13:00)  urine output 685cc/24h      PHYSICAL EXAM:  Constitutional: intubated/sedated  HEENT: (+)ETT/OGT  Respiratory: coarse BS  Cardiovascular: ambika s1s2  Gastrointestinal: BS+, soft, NT/ND  Extremities: + peripheral edema  Neurological: tone WNL  : + Wheeler  Skin: No rashes  Access: RI temp HD catheter ()    LABS:                        7.4    5.95  )-----------( 146      ( 01 Sep 2023 05:30 )             22.8     Na(134)/K(3.6)/Cl(98)/HCO3(25)/BUN(59)/Cr(2.14)Glu(260)/Ca(7.9)/Mg(1.60)/PO4(3.3)     @ 00:35  Na(135)/K(3.9)/Cl(98)/HCO3(24)/BUN(42)/Cr(1.75)Glu(348)/Ca(8.0)/Mg(1.70)/PO4(2.4)     @ 00:15  Na(135)/K(3.3)/Cl(97)/HCO3(23)/BUN(57)/Cr(2.29)Glu(402)/Ca(8.2)/Mg(1.80)/PO4(4.1)     @ 00:05    Urinalysis Basic - ( 01 Sep 2023 09:34 )  Color: Yellow / Appearance: Turbid / S.023 / pH: x  Gluc: x / Ketone: Negative mg/dL  / Bili: Negative / Urobili: 1.0 mg/dL   Blood: x / Protein: 300 mg/dL / Nitrite: Negative   Leuk Esterase: Large / RBC: 211 /HPF / WBC 55 /HPF   Sq Epi: x / Non Sq Epi: 1 /HPF / Bacteria: Many /HPF      IMPRESSION: 73F w/ HTN, DM2, CVA, breast CA-bilateral mastectomy, recurrent aspiration pneumonia/respiratory failure, and CKD, 23 p/w acute hypercapnic respiratory failure; c/b RUSS    (1)CKD - stage 4     (2)RUSS - borderline oliguric AIN vs ATN - PUF yesterday, HD today     (3)A/I - ?AIN - on empiric Prednisone 40mg qd x past 2 weeks - we had planned to start tapering today    (4)Lytes - acceptable    (5)Pulm- hypercapnic respiratory failure on admission - remains intubated    (6)CV - newly in shock as of today/on pressors      RECOMMEND:  (1)HD today - no net UF - escalation of pressors as needed to keep SBP>90 on HD  (2)No objection to starting to taper Prednisone  (3)Dose new meds for GFR ~15ml/min              Jimmy Colon MD  [facility]  Office/on call physician: [phone]  Cell (7a-7b): [phone]       Overnight events noted - hypotensive/shock - now off antihypertensives and on a Levophed gtt  S/p tracheostomy this a.m.; on vent/Precedex      VITAL:  T(C): , Max: 37.1 (23 @ 04:30)  T(F): , Max: 98.8 (23 @ 04:30)  HR: 54 (23 @ 13:00)  BP: 85/14 (23 @ 06:00)  BP(mean): 29 (23 @ 06:00)  RR: 12 (23 @ 13:00)  SpO2: 100% (23 @ 13:00)  urine output 685cc/24h      PHYSICAL EXAM:  Constitutional: sedated on Precedex  HEENT: (+)NGT  Neck: (+)trach-vent  Respiratory: coarse BS b/l  Cardiovascular: ambika s1s2  Gastrointestinal: BS+, soft, NT/ND  Extremities: + peripheral edema  Neurological: tone WNL  : + Wheeler  Skin: No rashes  Access: [facility] HD catheter ()    LABS:                        7.4    5.95  )-----------( 146      ( 01 Sep 2023 05:30 )             22.8     Na(134)/K(3.6)/Cl(98)/HCO3(25)/BUN(59)/Cr(2.14)Glu(260)/Ca(7.9)/Mg(1.60)/PO4(3.3)     @ 00:35  Na(135)/K(3.9)/Cl(98)/HCO3(24)/BUN(42)/Cr(1.75)Glu(348)/Ca(8.0)/Mg(1.70)/PO4(2.4)     @ 00:15  Na(135)/K(3.3)/Cl(97)/HCO3(23)/BUN(57)/Cr(2.29)Glu(402)/Ca(8.2)/Mg(1.80)/PO4(4.1)     @ 00:05    Urinalysis Basic - ( 01 Sep 2023 09:34 )  Color: Yellow / Appearance: Turbid / S.023 / pH: x  Gluc: x / Ketone: Negative mg/dL  / Bili: Negative / Urobili: 1.0 mg/dL   Blood: x / Protein: 300 mg/dL / Nitrite: Negative   Leuk Esterase: Large / RBC: 211 /HPF / WBC 55 /HPF   Sq Epi: x / Non Sq Epi: 1 /HPF / Bacteria: Many /HPF      IMPRESSION: 73F w/ HTN, DM2, CVA, breast CA-bilateral mastectomy, recurrent aspiration pneumonia/respiratory failure, and CKD, 23 p/w acute hypercapnic respiratory failure; c/b RUSS    (1)CKD - stage 4     (2)RUSS - borderline oliguric AIN vs ATN - PUF yesterday, HD today     (3)A/I - ?AIN - on empiric Prednisone 40mg qd x past 2 weeks - we had planned to start tapering today    (4)Lytes - acceptable    (5)Pulm- hypercapnic respiratory failure on admission - now s/p trach; remains on vent    (6)CV - newly in shock as of last night/on pressors      RECOMMEND:  (1)HD today - no net UF - escalation of pressors as needed to keep SBP>90 on HD  (2)No objection to starting to taper Prednisone  (3)Dose new meds for GFR ~15ml/min              Jimmy Colon MD  [facility]  Office/on call physician: [phone]  Cell (7a-7p): [phone]

## 2023-09-01 NOTE — CONSULT NOTE ADULT - ASSESSMENT
72 y/o F DM on insulin, HTN, CKD,breast CA, respiratory arrest and cardiac arrest (2018), CVA with residual weakness, aspiration PNA h/o trache, PEG, both removed presents with respiratory distress admitted to MICU for acute hypoxemic and hypercapnic respiratory failure requiring intubation in setting of RUSS and acute pulmonary edema, possible ATN vs AIN, now with prolonged mechanical ventilation, IP was consulted for percutaneous tracheostomy placement    #Prolonged mechanical ventilation  - percutaneous tracheostomy performed with placement of 6 cuffed tube   -Minimize tension on tracheostomy: Keep tracheostomy elevated on towels to maintain perpendicular to neck and keep enough slack on vent tubing to avoid tension  -Sedate/Restrain patient to ensure does not pull at tracheostomy  -Keep tracheostomy retention collar in place at all times  -Utilize tracheostomy specific in-line suction or red rubber suction tubing through the swivel valve  -First dressing change at 72 hours. If dressings are grossly saturated before that time, reenforce dressings and page pulmonary/critical care fellow  -At 4 days the 2 o'clock and 8 o'clock sutures may be removed. The other 2 sutures are to remain in place for 10 days, please do not remove before then. Sutures may be removed by the primary service. If feel there are issues with the sutures, please contact MICU/Pulmonary fellow  -First tracheostomy change to happen in 3 weeks. If patient still admitted at that time please page pulmonary service to arrange exchange      -If issues with bleeding call pulmonary/critical care fellow on call  -If tracheostomy becomes dislodged prior to initial tracheostomy exchange do NOT attempt to replace the tracheostomy. Call MICU or Anesthesia immediately to intubate the patient orally with an ETT and page pulmonary/critical care fellow on call    Roberto Meade MD.   Interventional Pulmonology.

## 2023-09-01 NOTE — PROGRESS NOTE ADULT - SUBJECTIVE AND OBJECTIVE BOX
Patient is a 73y old  Female who presents with a chief complaint of Respiratory distress (31 Aug 2023 17:51)      SUBJECTIVE / OVERNIGHT EVENTS:    MEDICATIONS  (STANDING):  albuterol/ipratropium for Nebulization 3 milliLiter(s) Nebulizer every 8 hours  atorvastatin 40 milliGRAM(s) Oral at bedtime  chlorhexidine 0.12% Liquid 15 milliLiter(s) Oral Mucosa every 12 hours  chlorhexidine 2% Cloths 1 Application(s) Topical daily  cloNIDine 0.3 milliGRAM(s) Oral every 8 hours  dexMEDEtomidine Infusion 1 MICROgram(s)/kG/Hr (16.6 mL/Hr) IV Continuous <Continuous>  dextrose 5%. 1000 milliLiter(s) (50 mL/Hr) IV Continuous <Continuous>  dextrose 5%. 1000 milliLiter(s) (100 mL/Hr) IV Continuous <Continuous>  dextrose 50% Injectable 12.5 Gram(s) IV Push once  dextrose 50% Injectable 25 Gram(s) IV Push once  dextrose 50% Injectable 25 Gram(s) IV Push once  doxazosin 6 milliGRAM(s) Oral <User Schedule>  glucagon  Injectable 1 milliGRAM(s) IntraMuscular once  insulin lispro (ADMELOG) corrective regimen sliding scale   SubCutaneous every 6 hours  insulin NPH human recombinant 5 Unit(s) SubCutaneous every 6 hours  labetalol 600 milliGRAM(s) Oral three times a day  norepinephrine Infusion 0.05 MICROgram(s)/kG/Min (3.12 mL/Hr) IV Continuous <Continuous>  pantoprazole  Injectable 40 milliGRAM(s) IV Push two times a day  predniSONE   Tablet 40 milliGRAM(s) Oral daily  sevelamer carbonate Powder 1600 milliGRAM(s) Oral three times a day    MEDICATIONS  (PRN):  acetaminophen   Oral Liquid .. 650 milliGRAM(s) Oral every 6 hours PRN Temp greater or equal to 38C (100.4F), Mild Pain (1 - 3)  dextrose Oral Gel 15 Gram(s) Oral once PRN Blood Glucose LESS THAN 70 milliGRAM(s)/deciliter      CAPILLARY BLOOD GLUCOSE      POCT Blood Glucose.: 184 mg/dL (01 Sep 2023 06:40)  POCT Blood Glucose.: 258 mg/dL (31 Aug 2023 23:37)  POCT Blood Glucose.: 215 mg/dL (31 Aug 2023 17:22)  POCT Blood Glucose.: 345 mg/dL (31 Aug 2023 12:54)    I&O's Summary    31 Aug 2023 07:01  -  01 Sep 2023 07:00  --------------------------------------------------------  IN: 1365.3 mL / OUT: 685 mL / NET: 680.3 mL        Vital Signs Last 24 Hrs  T(C): 37.1 (01 Sep 2023 04:30), Max: 37.1 (01 Sep 2023 04:30)  T(F): 98.8 (01 Sep 2023 04:30), Max: 98.8 (01 Sep 2023 04:30)  HR: 69 (01 Sep 2023 07:00) (51 - 83)  BP: 85/14 (01 Sep 2023 06:00) (78/18 - 101/23)  BP(mean): 29 (01 Sep 2023 06:00) (29 - 39)  RR: 19 (01 Sep 2023 07:00) (11 - 25)  SpO2: 95% (01 Sep 2023 07:00) (93% - 100%)    Parameters below as of 01 Sep 2023 04:30  Patient On (Oxygen Delivery Method): ventilator        PHYSICAL EXAM:  GENERAL: NAD, well-developed, well-nourished  HEAD: Atraumatic, Normocephalic  EYES: EOMI, PERRLA, conjunctiva and sclera clear  NECK: Supple, No JVD  CHEST/LUNG: Clear to auscultation bilaterally; No wheezes or crackles  HEART: Normal S1/S2; Regular rate and rhythm; No murmurs, rubs, or gallops  ABDOMEN: Soft, Nontender, Nondistended; Bowel sounds present  EXTREMITIES: 2+ Peripheral Pulses; No clubbing, cyanosis, or edema  PSYCH: A&Ox3  NEUROLOGY: no focal neurologic deficit  SKIN: No rashes or lesions    LABS:                        7.4    5.95  )-----------( 146      ( 01 Sep 2023 05:30 )             22.8      09-01    134<L>  |  98  |  59<H>  ----------------------------<  260<H>  3.6   |  25  |  2.14<H>    Ca    7.9<L>      01 Sep 2023 00:35  Phos  3.3     09-01  Mg     1.60     09-01    TPro  4.9<L>  /  Alb  2.0<L>  /  TBili  0.2  /  DBili  x   /  AST  9   /  ALT  10  /  AlkPhos  84  09-01    PT/INR - ( 01 Sep 2023 00:35 )   PT: 12.2 sec;   INR: 1.09 ratio         PTT - ( 01 Sep 2023 00:35 )  PTT:23.5 sec      Urinalysis Basic - ( 01 Sep 2023 00:35 )    Color: x / Appearance: x / SG: x / pH: x  Gluc: 260 mg/dL / Ketone: x  / Bili: x / Urobili: x   Blood: x / Protein: x / Nitrite: x   Leuk Esterase: x / RBC: x / WBC x   Sq Epi: x / Non Sq Epi: x / Bacteria: x        RADIOLOGY & ADDITIONAL TESTS:    Imaging Personally Reviewed:    Consultant(s) Notes Reviewed:      Care Discussed with Consultants/Other Providers:   *******************************  Martha Douglas PGY-2  *******************************    INTERVAL HPI/OVERNIGHT EVENTS: Overnight became hypotensive (80s) and bradycardic (50s), was started on levophed with improvement in vitals. Also noted to have some foul smelling secretions.    SUBJECTIVE: Patient seen and examined at bedside. Responds to commands, moves all 4 extremities, unable to further interview 2/2 intubation.    OBJECTIVE:    VITAL SIGNS:  ICU Vital Signs Last 24 Hrs  T(C): 35.8 (01 Sep 2023 08:00), Max: 37.1 (01 Sep 2023 04:30)  T(F): 96.5 (01 Sep 2023 08:00), Max: 98.8 (01 Sep 2023 04:30)  HR: 64 (01 Sep 2023 10:29) (51 - 72)  BP: 85/14 (01 Sep 2023 06:00) (78/18 - 101/23)  BP(mean): 29 (01 Sep 2023 06:00) (29 - 39)  ABP: 121/29 (01 Sep 2023 10:00) (93/21 - 215/50)  ABP(mean): 59 (01 Sep 2023 10:00) (41 - 113)  RR: 19 (01 Sep 2023 10:00) (11 - 25)  SpO2: 95% (01 Sep 2023 10:29) (93% - 100%)    O2 Parameters below as of 01 Sep 2023 10:00  Patient On (Oxygen Delivery Method): ventilator    O2 Concentration (%): 30      Mode: AC/ CMV (Assist Control/ Continuous Mandatory Ventilation), RR (machine): 12, TV (machine): 360, FiO2: 30, PEEP: 5, ITime: 0.86, MAP: 11, PIP: 40    08-31 @ 07:01  -   @ 07:00  --------------------------------------------------------  IN: 1365.3 mL / OUT: 685 mL / NET: 680.3 mL     @ 07: @ 11:01  --------------------------------------------------------  IN: 47.4 mL / OUT: 10 mL / NET: 37.4 mL      CAPILLARY BLOOD GLUCOSE      POCT Blood Glucose.: 184 mg/dL (01 Sep 2023 06:40)        PHYSICAL EXAM:  General: Comfortable, no acute distress, cooperative with exam. Sedated/intubated  HEENT: PERRLA, EOMI, moist mucous membranes.  Respiratory: CTAB, mechanical breath sounds, no coughing, wheezes, crackles, or rales.  CV: Bradycardic, S1S2, no murmurs, rubs or gallops. No JVD. Distal pulses intact.  Abdominal: Soft, nontender, nondistended.  Neurology: Sedated/intubated, responsive to verbal stimuli, no focal neuro defects, CENTENO x 4.  Extremities: +Pitting edema bilaterally, improved      MEDICATIONS:  MEDICATIONS  (STANDING):  albuterol/ipratropium for Nebulization 3 milliLiter(s) Nebulizer every 8 hours  atorvastatin 40 milliGRAM(s) Oral at bedtime  chlorhexidine 0.12% Liquid 15 milliLiter(s) Oral Mucosa every 12 hours  chlorhexidine 2% Cloths 1 Application(s) Topical daily  cloNIDine 0.3 milliGRAM(s) Oral every 8 hours  dexMEDEtomidine Infusion 1 MICROgram(s)/kG/Hr (16.6 mL/Hr) IV Continuous <Continuous>  dextrose 5%. 1000 milliLiter(s) (50 mL/Hr) IV Continuous <Continuous>  dextrose 5%. 1000 milliLiter(s) (100 mL/Hr) IV Continuous <Continuous>  dextrose 50% Injectable 12.5 Gram(s) IV Push once  dextrose 50% Injectable 25 Gram(s) IV Push once  dextrose 50% Injectable 25 Gram(s) IV Push once  doxazosin 6 milliGRAM(s) Oral <User Schedule>  glucagon  Injectable 1 milliGRAM(s) IntraMuscular once  insulin lispro (ADMELOG) corrective regimen sliding scale   SubCutaneous every 6 hours  insulin NPH human recombinant 5 Unit(s) SubCutaneous every 6 hours  labetalol 600 milliGRAM(s) Oral three times a day  norepinephrine Infusion 0.05 MICROgram(s)/kG/Min (3.12 mL/Hr) IV Continuous <Continuous>  pantoprazole  Injectable 40 milliGRAM(s) IV Push two times a day  predniSONE   Tablet 40 milliGRAM(s) Oral daily  sevelamer carbonate Powder 1600 milliGRAM(s) Oral three times a day    MEDICATIONS  (PRN):  acetaminophen   Oral Liquid .. 650 milliGRAM(s) Oral every 6 hours PRN Temp greater or equal to 38C (100.4F), Mild Pain (1 - 3)  dextrose Oral Gel 15 Gram(s) Oral once PRN Blood Glucose LESS THAN 70 milliGRAM(s)/deciliter      ALLERGIES:  Allergies    isoniazid (Rash)  nafcillin (Unknown)  hydrALAZINE (Rash)  vitamin E (Short breath; Urticaria; Hives)  doxycycline (Rash)  cefepime (Rash)  NIFEdipine (Urticaria; Hives)    Intolerances        LABS:                        7.4    5.95  )-----------( 146      ( 01 Sep 2023 05:30 )             22.8     09-    134<L>  |  98  |  59<H>  ----------------------------<  260<H>  3.6   |  25  |  2.14<H>    Ca    7.9<L>      01 Sep 2023 00:35  Phos  3.3       Mg     1.60         TPro  4.9<L>  /  Alb  2.0<L>  /  TBili  0.2  /  DBili  x   /  AST  9   /  ALT  10  /  AlkPhos  84  -01    PT/INR - ( 01 Sep 2023 00:35 )   PT: 12.2 sec;   INR: 1.09 ratio         PTT - ( 01 Sep 2023 00:35 )  PTT:23.5 sec  Urinalysis Basic - ( 01 Sep 2023 09:34 )    Color: Yellow / Appearance: Turbid / S.023 / pH: x  Gluc: x / Ketone: Negative mg/dL  / Bili: Negative / Urobili: 1.0 mg/dL   Blood: x / Protein: 300 mg/dL / Nitrite: Negative   Leuk Esterase: Large / RBC: 211 /HPF / WBC 55 /HPF   Sq Epi: x / Non Sq Epi: 1 /HPF / Bacteria: Many /HPF        RADIOLOGY & ADDITIONAL TESTS: Reviewed.

## 2023-09-01 NOTE — CHART NOTE - NSCHARTNOTEFT_GEN_A_CORE
: Deshaun    INDICATION: Percutaneous Tracheostomy    PROCEDURE:  [ ] LIMITED ECHO  [ ] LIMITED CHEST  [ ] LIMITED RETROPERITONEAL  [ ] LIMITED ABDOMINAL  [ ] LIMITED DVT  [ ] NEEDLE GUIDANCE VASCULAR  [ ] NEEDLE GUIDANCE THORACENTESIS  [ ] NEEDLE GUIDANCE PARACENTESIS  [ ] NEEDLE GUIDANCE PERICARDIOCENTESIS  [X] LIMITED NECK    FINDINGS: The thyroid cartilage, cricoid cartilage and tracheal rings were located. The thyroid and isthmus was located. No high riding vessel noted. No large arterial vessels noted in the field of the percutaneous tracheostomy.     INTERPRETATION: No contraindications to proceeding with percutaneous tracheostomy.     Images stored on [employer].

## 2023-09-01 NOTE — PROGRESS NOTE ADULT - ASSESSMENT
73 year old female with intermittent episodes of epistaxis, DM on insulin, HTN, CKD, CHFpEF, breast CA s/p mastectomy, Latent Tb, respiratory failure and cardiac arrest (2018), CVA with residual weakness, aspiration PNA h/o trach and prior PEG for dysphagia in 2018, both removed who initially presented to the ED with respiratory distress requiring intubation and remains intubated, PNA-Staph aureus, OGT placed with worsening renal function and concerns for ATN vs AIN, started on prednisone 40 on 8/18 /w/o PPI, with red blood noted in oral suctioning with respiratory therapy on Sunday 8/20; had multiple episodes of NBNB vomiting on Wednesday/Thursday per family, then yesterday had one episode of coffee ground emesis, PPI 40 IV BID subsequently started.  Overnight on 8/30-8/31 patient developed dark maroon stools with concomitant drop in hemoglobin.    # Dark maroon stools  # Coffee ground emesis  -EGD/colonoscopy 8/31/2023 reveals LA Grade D esophagitis. Suspect likely etiology of prior coffee ground emesis.  Non-bleeding erosive gastropathy. No specimens collected.  Normal examined duodenum.  No active source of GI bleeding identified on this exam.  Preparation of the colon was inadequate for screening purposes.  Old red blood was found in the entire colon. This was cleared with fair visualization with no active bleeding source identified on this examination.  There was old red blood in the terminal ileum, but this was cleared with underlying bilious fluid. Suspect likely refluxed blood into TI after enema administration, lower suspicion for small bowel bleed though this is not definitively ruled out.  No specimens collected.    Recommendations:  -trend vitals, CBC, and monitor for clinical signs of bleeding  -maintain active type and screen  -transfusion goal to maintain hemoglobin >/= 7.0 and platelets >/= 50  -avoid NSAIDs  -PPI IV BID for projected 8 week course followed by once daily  -If patient rebleeds, recommend STAT CTA to identify source with IR consultation if positive for evaluation of embolization    Note incomplete until finalized by attending signature/attestation.    Geremias Olmedo  GI/Hepatology Fellow    MONDAY-FRIDAY 8AM-5PM:  Pager# [phone] ([facility]) or [phone] ([facility])    NON-URGENT CONSULTS:  Please email [email or web address]  AT NIGHT AND ON WEEKENDS:  Contact on-call GI fellow via answering service ([phone]) from 5pm-8am and on weekends/holidays

## 2023-09-02 NOTE — CHART NOTE - NSCHARTNOTEFT_GEN_A_CORE
MICU Transfer Note    Transfer from: MICU    Transfer to: (  ) Medicine    (  ) Telemetry     ( X ) RCU        (    ) Palliative         (   ) Stroke Unit          (   ) __________________    Accepting Physician: Hayes Moss  Signout given to:     MICU COURSE:    72 y/o F DM on insulin, HTN, CKD, CHFpEF, breast CA, respiratory arrest and cardiac arrest (2018), CVA with residual weakness, aspiration PNA h/o trache, PEG, both removed presents with respiratory distress, trialed on BIPAP but requiring intubation. May be volume overload i/s/o CHF/CKD vs decrease respiratory drive (given oxygen at home). Initally started on vanc, aztreonam and Flagyl, then transitioned to cefepime, however had drug eruption so was discontinued. She had difficulty weaning off sedation, and so CT was done and was negative. Had worsening RUSS, so nephro was consulted for possible AIN and started on steroids for 2 week course ending 9/1. Despite steroids, still appeared overloaded and oliguric for which dialysis was initiated. Nephro recommended renal biopsy however family felt risk outweighed benefit and deferred.     She also had labile blood pressures for which she was titrated up to 600 labetalol TID, clonidine 0.3 TID and cardura 6 mg BID. She then became hypotensive, briefly requiring pressors. She became febrile, and was started on meropenem for positive UA and bacteremia given her prior allergic reactions.     Her course was complicated by an episode of coffee ground emesis and melena, for which GI was consulted and did EGD, colonoscopy, which found erosive gastropathy, and recommended to start on PPI BID for projected 8 week course followed by once daily. She required multiple transfusions.     She had multiple SBTs, during which she was noted to not pull adequate tidal volumes and so she was unable to be extubated. Percutaneous tracheostomy was placed on 9/1.     Patient is hemodynamically stable and ready for transfer out of the ICU.    ASSESSMENT & PLAN:   72 y/o F DM on insulin, HTN, CKD, CHFpEF, breast CA, respiratory arrest and cardiac arrest (2018), CVA with residual weakness, aspiration PNA h/o trache, PEG, both removed presents with respiratory distress requiring intubation. May be volume overload i/s/o CHF/CKD vs decrease respiratory drive (given oxygen at home). Improving mental status however worsening renal function and anuria, concerning for ATN vs AIN.     Neuro   #AMS  #Metabolic encephalopathy   #CVA   Baseline MS AOx3 with short term memory changes per family  - MRI brain 8/17 showed right cerebellar infarct (new) with old left PCA/occipital infarcts, likely embolic in nature - STEPHANIE with no TRINITY thrombus or intracardiac shunts  - Now with mild sedation with Precedex for ventilator synchrony  - Intermittent shaking noted, per family happens at baseline but more pronounced here; EEG without epileptiform activity, per neuro no need for LP  - Restarted aspirin 8/26 - held 8/31 iso bleed     Cardiac   Labile blood pressure - ranging between SBP 80s-200s on 8/31  - Labetalol 600 TID - overnight became hypotensive/bradycardic ? iso BB toxicity. Labetalol is renally cleared, consider restarting carvedilol if becomes hypertensive  - Clonidine increased to 0.3 TID - held as on pressors  - Home Cardura - held as on pressors  - Per discussion, order of adding BP meds: 1) cardura, 2) carvedilol, 3) clonidine    #CHFpEF   - TTE 7/2023 with EF 59%, with severe LVH and diastolic dysfunction   - pro-BNP > 86999, trop 78   - Repeat TTE with EF 60% normal systolic and grade 1 diastolic dysfunction     Pulmonary   #Acute hypercapnic and hypoxemic respiratory failure   - DDx: aspiration vs volume overload vs sedating medication decreasing drive (was given nyquil)   - s/p trach    /Renal   #RUSS on CKD  - Ddx: obstructive (willard in, no hydro on POCUS) vs low flow state vs AIN i/s/o cephalosporin use and drug rash   - kidney biopsy to r/o AIN planned 8/22, however family felt procedure too risky and so deferred  - cont empiric prednisone 40mg started 8/18   - Continue to monitor, dose meds per eGFR. avoid nephrotoxic agents  - No HD today  - Strict IOs per nephro; Willard removed with positive UA  - S/p prednisone 40 for two week course ending 9/1, will taper 10 per day per nephro, starting 9/3 will start 30 mg    # Hyperphosphatemia  - Continue to monitor BMP q12  - Started on renvela powder TID per nephro recs    GI  - Had one episode of coffee ground emesis 8/24, two episodes melena 8/31  - GI consulted, scope showed erosive gastropathy  - Continue PPI BID for 8 weeks  - Continue to monitor hemoglobin  - GI consulted again regarding PEG placement, follow up recs  - On orogastric tube feeds currently   - Monitor for bowel movements    Endocrine  #T2DM   - ISS  - Restarted NPH at 1, monitor BG and adjust to keep -180    ID   Started on empiric vancomycin and aztreonam (cefepime/zosyn allergy? developed rash req prednisone)   Trialed cefepime --> cefazolin (sputum MSSA), developed drug eruption - discontinued   UA +, Summer DC'd  Per ID pharmacist, started on meropenem  - Will need ID consult in AM as cultures growing MSSA    Heme/Onc  ppx: heparin q8 hrs - held as hemoglobin downtrending, asa held for the same  - Continue to monitor CBC  - SCDs ordered    Ethics  GOC - Per previous GOC with daughter and , reported that they have not talked about end of life care with the patient. For now, they wanted "everything to be done" including CPR[facility]          FOR FOLLOW UP:  [ ] ID consult for MSSA bacteremia  [ ] Follow up GI recs regarding PEG placement  [ ] Follow up nephro recs regarding dialysis and further weaning of steroids  [ ] Monitor blood pressures closely  [ ] Restart aspirin, heparin subQ when hemoglobin stable  [ ] Renal biopsy when stable MICU Transfer Note    Transfer from: MICU    Transfer to: (  ) Medicine    (  ) Telemetry     ( X ) RCU        (    ) Palliative         (   ) Stroke Unit          (   ) __________________    Accepting Physician: Hayes Moss  Signout given to:     MICU COURSE:    72 y/o F DM on insulin, HTN, CKD, CHFpEF, breast CA, respiratory arrest and cardiac arrest (2018), CVA with residual weakness, aspiration PNA h/o trache, PEG, both removed presents with respiratory distress, trialed on BIPAP but requiring intubation. May be volume overload i/s/o CHF/CKD vs decrease respiratory drive (given oxygen at home). Initally started on vanc, aztreonam and Flagyl, then transitioned to cefepime, however had drug eruption so was discontinued. She had difficulty weaning off sedation, and so CT was done and was negative. Had worsening RUSS, so nephro was consulted for possible AIN and started on steroids for 2 week course ending 9/1. Despite steroids, still appeared overloaded and oliguric for which dialysis was initiated. Nephro recommended renal biopsy however family felt risk outweighed benefit and deferred.     She also had labile blood pressures for which she was titrated up to 600 labetalol TID, clonidine 0.3 TID and cardura 6 mg BID. She then became hypotensive, briefly requiring pressors. She became febrile, and was started on meropenem for positive UA and bacteremia given her prior allergic reactions.     Her course was complicated by an episode of coffee ground emesis and melena, for which GI was consulted and did EGD, colonoscopy, which found erosive gastropathy, and recommended to start on PPI BID for projected 8 week course followed by once daily. She required multiple transfusions.     She had multiple SBTs, during which she was noted to not pull adequate tidal volumes and so she was unable to be extubated. Percutaneous tracheostomy was placed on 9/1.     Patient is hemodynamically stable and ready for transfer out of the ICU.    ASSESSMENT & PLAN:   72 y/o F DM on insulin, HTN, CKD, CHFpEF, breast CA, respiratory arrest and cardiac arrest (2018), CVA with residual weakness, aspiration PNA h/o trache, PEG, both removed presents with respiratory distress requiring intubation. May be volume overload i/s/o CHF/CKD vs decrease respiratory drive (given oxygen at home). Improving mental status however worsening renal function and anuria, concerning for ATN vs AIN.     Neuro   #AMS  #Metabolic encephalopathy   #CVA   Baseline MS AOx3 with short term memory changes per family  - MRI brain 8/17 showed right cerebellar infarct (new) with old left PCA/occipital infarcts, likely embolic in nature - STEPHANIE with no TRINITY thrombus or intracardiac shunts  - Now with mild sedation with Precedex for ventilator synchrony  - Intermittent shaking noted, per family happens at baseline but more pronounced here; EEG without epileptiform activity, per neuro no need for LP  - Restarted aspirin 8/26 - held 8/31 iso bleed     Cardiac   Labile blood pressure - ranging between SBP 80s-200s on 8/31  - Labetalol 600 TID - overnight became hypotensive/bradycardic ? iso BB toxicity. Labetalol is renally cleared, consider restarting carvedilol if becomes hypertensive  - Clonidine increased to 0.3 TID - held as on pressors  - Home Cardura - held as on pressors  - Per discussion, order of adding BP meds: 1) cardura, 2) carvedilol, 3) clonidine    #CHFpEF   - TTE 7/2023 with EF 59%, with severe LVH and diastolic dysfunction   - pro-BNP > 74741, trop 78   - Repeat TTE with EF 60% normal systolic and grade 1 diastolic dysfunction     Pulmonary   #Acute hypercapnic and hypoxemic respiratory failure   - DDx: aspiration vs volume overload vs sedating medication decreasing drive (was given nyquil)   - s/p trach    /Renal   #RUSS on CKD  - Ddx: obstructive (willard in, no hydro on POCUS) vs low flow state vs AIN i/s/o cephalosporin use and drug rash   - kidney biopsy to r/o AIN planned 8/22, however family felt procedure too risky and so deferred  - cont empiric prednisone 40mg started 8/18   - Continue to monitor, dose meds per eGFR. avoid nephrotoxic agents  - No HD today  - Strict IOs per nephro; Willard removed with positive UA  - S/p prednisone 40 for two week course ending 9/1, will taper 10 per day per nephro, starting 9/3 will start 30 mg    # Hyperphosphatemia  - Continue to monitor BMP q12  - Started on renvela powder TID per nephro recs    GI  - Had one episode of coffee ground emesis 8/24, two episodes melena 8/31  - GI consulted, scope showed erosive gastropathy  - Continue PPI BID for 8 weeks  - Continue to monitor hemoglobin  - GI consulted again regarding PEG placement, follow up recs  - On orogastric tube feeds currently   - Monitor for bowel movements    Endocrine  #T2DM   - ISS  - Restarted NPH at 1, monitor BG and adjust to keep -180    ID   Started on empiric vancomycin and aztreonam (cefepime/zosyn allergy? developed rash req prednisone)   Trialed cefepime --> cefazolin (sputum MSSA), developed drug eruption - discontinued   UA +, Summer MARTINEZ'd  Per ID pharmacist, started on meropenem  - Will need ID consult in AM as cultures growing MSSA    Heme/Onc  ppx: heparin q8 hrs - held as hemoglobin downtrending, asa held for the same  - Continue to monitor CBC  - SCDs ordered    Ethics  GOC - Per previous GOC with daughter and , reported that they have not talked about end of life care with the patient. For now, they wanted "everything to be done" including CPR, [facility], [facility]          FOR FOLLOW UP:  [ ] ID consult for MSSA bacteremia  [ ] Dental pain per family, dental consulted, was asked to reconsult on Tuesday, Dilaudid 0.5 PRN   [ ] Follow up GI recs regarding PEG placement  [ ] Follow up nephro recs regarding dialysis and further weaning of steroids  [ ] Monitor blood pressures closely  [ ] Restart aspirin, heparin subQ when hemoglobin stable  [ ] Renal biopsy when stable

## 2023-09-02 NOTE — PROGRESS NOTE ADULT - SUBJECTIVE AND OBJECTIVE BOX
Patient is a 73y old  Female who presents with a chief complaint of Respiratory distress (02 Sep 2023 16:58)      SUBJECTIVE / OVERNIGHT EVENTS: awaiting transfer to RCU    MEDICATIONS  (STANDING):  albuterol/ipratropium for Nebulization 3 milliLiter(s) Nebulizer every 8 hours  atorvastatin 40 milliGRAM(s) Oral at bedtime  chlorhexidine 0.12% Liquid 15 milliLiter(s) Oral Mucosa every 12 hours  chlorhexidine 2% Cloths 1 Application(s) Topical daily  dextrose 5%. 1000 milliLiter(s) (50 mL/Hr) IV Continuous <Continuous>  dextrose 5%. 1000 milliLiter(s) (100 mL/Hr) IV Continuous <Continuous>  dextrose 50% Injectable 12.5 Gram(s) IV Push once  dextrose 50% Injectable 25 Gram(s) IV Push once  dextrose 50% Injectable 25 Gram(s) IV Push once  doxazosin 6 milliGRAM(s) Oral <User Schedule>  glucagon  Injectable 1 milliGRAM(s) IntraMuscular once  insulin lispro (ADMELOG) corrective regimen sliding scale   SubCutaneous every 6 hours  insulin NPH human recombinant 1 Unit(s) SubCutaneous every 6 hours  meropenem  IVPB 500 milliGRAM(s) IV Intermittent every 12 hours  pantoprazole  Injectable 40 milliGRAM(s) IV Push two times a day  sevelamer carbonate Powder 1600 milliGRAM(s) Oral three times a day  tranexamic acid Injectable for Topical Use 10 milliLiter(s) Topical once    MEDICATIONS  (PRN):  acetaminophen   Oral Liquid .. 650 milliGRAM(s) Oral every 6 hours PRN Temp greater or equal to 38C (100.4F), Mild Pain (1 - 3)  dextrose Oral Gel 15 Gram(s) Oral once PRN Blood Glucose LESS THAN 70 milliGRAM(s)/deciliter      Vital Signs Last 24 Hrs  T(F): 99.1 (09-02-23 @ 20:00), Max: 99.8 (09-02-23 @ 16:00)  HR: 95 (09-02-23 @ 21:00) (65 - 95)  BP: 118/50 (09-02-23 @ 21:00) (106/40 - 118/50)  RR: 23 (09-02-23 @ 21:00) (12 - 25)  SpO2: 98% (09-02-23 @ 21:00) (90% - 100%)  Telemetry:   CAPILLARY BLOOD GLUCOSE      POCT Blood Glucose.: 354 mg/dL (02 Sep 2023 18:30)  POCT Blood Glucose.: 308 mg/dL (02 Sep 2023 11:45)  POCT Blood Glucose.: 192 mg/dL (02 Sep 2023 05:22)  POCT Blood Glucose.: 147 mg/dL (01 Sep 2023 23:15)    I&O's Summary    01 Sep 2023 07:01  -  02 Sep 2023 07:00  --------------------------------------------------------  IN: 1631.4 mL / OUT: 430 mL / NET: 1201.4 mL    02 Sep 2023 07:01  -  02 Sep 2023 21:44  --------------------------------------------------------  IN: 630 mL / OUT: 0 mL / NET: 630 mL        PHYSICAL EXAM:  GENERAL: NAD, well-developed  HEAD:  Atraumatic, Normocephalic  EYES: EOMI, PERRLA, conjunctiva and sclera clear  NECK: Supple, No JVD  CHEST/LUNG: Clear to auscultation bilaterally; No wheeze  HEART: Regular rate and rhythm; No murmurs, rubs, or gallops  ABDOMEN: Soft, Nontender, Nondistended; Bowel sounds present  EXTREMITIES:  2+ Peripheral Pulses, No clubbing, cyanosis, or edema  PSYCH: AAOx3  NEUROLOGY: non-focal  SKIN: No rashes or lesions    LABS:                        7.1    6.34  )-----------( 120      ( 02 Sep 2023 00:20 )             22.1     09-02    138  |  101  |  26<H>  ----------------------------<  154<H>  3.6   |  28  |  1.19    Ca    8.0<L>      02 Sep 2023 00:20  Phos  2.0     09-02  Mg     1.80     09-02    TPro  5.1<L>  /  Alb  2.0<L>  /  TBili  0.3  /  DBili  x   /  AST  11  /  ALT  11  /  AlkPhos  81  09-02    PT/INR - ( 02 Sep 2023 00:20 )   PT: 13.1 sec;   INR: 1.18 ratio         PTT - ( 02 Sep 2023 00:20 )  PTT:25.5 sec      Urinalysis Basic - ( 02 Sep 2023 00:20 )    Color: x / Appearance: x / SG: x / pH: x  Gluc: 154 mg/dL / Ketone: x  / Bili: x / Urobili: x   Blood: x / Protein: x / Nitrite: x   Leuk Esterase: x / RBC: x / WBC x   Sq Epi: x / Non Sq Epi: x / Bacteria: x        RADIOLOGY & ADDITIONAL TESTS:    Imaging Personally Reviewed:    Consultant(s) Notes Reviewed:      Care Discussed with Consultants/Other Providers:

## 2023-09-02 NOTE — PROGRESS NOTE ADULT - SUBJECTIVE AND OBJECTIVE BOX
*******************************  Martha Douglas PGY-2  *******************************    INTERVAL HPI/OVERNIGHT EVENTS:    SUBJECTIVE: Patient seen and examined at bedside.     OBJECTIVE:    VITAL SIGNS:  ICU Vital Signs Last 24 Hrs  T(C): 37.5 (02 Sep 2023 04:00), Max: 37.5 (02 Sep 2023 04:00)  T(F): 99.5 (02 Sep 2023 04:00), Max: 99.5 (02 Sep 2023 04:00)  HR: 71 (02 Sep 2023 06:00) (54 - 76)  BP: --  BP(mean): --  ABP: 111/22 (02 Sep 2023 06:00) (110/26 - 172/51)  ABP(mean): 51 (02 Sep 2023 06:00) (51 - 94)  RR: 23 (02 Sep 2023 06:00) (12 - 27)  SpO2: 90% (02 Sep 2023 06:00) (90% - 100%)    O2 Parameters below as of 02 Sep 2023 06:00  Patient On (Oxygen Delivery Method): ventilator    O2 Concentration (%): 30      Mode: AC/ CMV (Assist Control/ Continuous Mandatory Ventilation), RR (machine): 12, TV (machine): 360, FiO2: 30, PEEP: 5, ITime: 0.89, MAP: 10, PIP: 38    09-01 @ 07:01  -  09-02 @ 07:00  --------------------------------------------------------  IN: 1577.4 mL / OUT: 430 mL / NET: 1147.4 mL      CAPILLARY BLOOD GLUCOSE      POCT Blood Glucose.: 192 mg/dL (02 Sep 2023 05:22)        PHYSICAL EXAM:  GENERAL: NAD, well-developed  HEAD:  Atraumatic, Normocephalic  EYES: EOMI, PERRLA, conjunctiva and sclera clear  NECK: Supple, No JVD  CHEST/LUNG: Clear to auscultation bilaterally; No wheeze  HEART: Regular rate and rhythm; No murmurs, rubs, or gallops  ABDOMEN: Soft, Nontender, Nondistended; Bowel sounds present  EXTREMITIES:  2+ Peripheral Pulses, No clubbing, cyanosis, or edema  PSYCH: AAOx3  NEUROLOGY: non-focal  SKIN: No rashes or lesions  Lines:      MEDICATIONS:  MEDICATIONS  (STANDING):  albuterol/ipratropium for Nebulization 3 milliLiter(s) Nebulizer every 8 hours  atorvastatin 40 milliGRAM(s) Oral at bedtime  chlorhexidine 0.12% Liquid 15 milliLiter(s) Oral Mucosa every 12 hours  chlorhexidine 2% Cloths 1 Application(s) Topical daily  cloNIDine 0.3 milliGRAM(s) Oral every 8 hours  dexMEDEtomidine Infusion 1 MICROgram(s)/kG/Hr (16.6 mL/Hr) IV Continuous <Continuous>  dextrose 5%. 1000 milliLiter(s) (50 mL/Hr) IV Continuous <Continuous>  dextrose 5%. 1000 milliLiter(s) (100 mL/Hr) IV Continuous <Continuous>  dextrose 50% Injectable 12.5 Gram(s) IV Push once  dextrose 50% Injectable 25 Gram(s) IV Push once  dextrose 50% Injectable 25 Gram(s) IV Push once  doxazosin 6 milliGRAM(s) Oral <User Schedule>  glucagon  Injectable 1 milliGRAM(s) IntraMuscular once  insulin lispro (ADMELOG) corrective regimen sliding scale   SubCutaneous every 6 hours  insulin NPH human recombinant 5 Unit(s) SubCutaneous every 6 hours  meropenem  IVPB 500 milliGRAM(s) IV Intermittent every 12 hours  norepinephrine Infusion 0.05 MICROgram(s)/kG/Min (3.12 mL/Hr) IV Continuous <Continuous>  pantoprazole  Injectable 40 milliGRAM(s) IV Push two times a day  predniSONE   Tablet 40 milliGRAM(s) Oral daily  sevelamer carbonate Powder 1600 milliGRAM(s) Oral three times a day  tranexamic acid Injectable for Topical Use 10 milliLiter(s) Topical once    MEDICATIONS  (PRN):  acetaminophen   Oral Liquid .. 650 milliGRAM(s) Oral every 6 hours PRN Temp greater or equal to 38C (100.4F), Mild Pain (1 - 3)  dextrose Oral Gel 15 Gram(s) Oral once PRN Blood Glucose LESS THAN 70 milliGRAM(s)/deciliter      ALLERGIES:  Allergies    isoniazid (Rash)  nafcillin (Unknown)  hydrALAZINE (Rash)  vitamin E (Short breath; Urticaria; Hives)  doxycycline (Rash)  cefepime (Rash)  NIFEdipine (Urticaria; Hives)    Intolerances        LABS:                        7.1    6.34  )-----------( 120      ( 02 Sep 2023 00:20 )             22.1     09-02    138  |  101  |  26<H>  ----------------------------<  154<H>  3.6   |  28  |  1.19    Ca    8.0<L>      02 Sep 2023 00:20  Phos  2.0     09-02  Mg     1.80     09-02    TPro  5.1<L>  /  Alb  2.0<L>  /  TBili  0.3  /  DBili  x   /  AST  11  /  ALT  11  /  AlkPhos  81  09-02    PT/INR - ( 02 Sep 2023 00:20 )   PT: 13.1 sec;   INR: 1.18 ratio         PTT - ( 02 Sep 2023 00:20 )  PTT:25.5 sec  Urinalysis Basic - ( 02 Sep 2023 00:20 )    Color: x / Appearance: x / SG: x / pH: x  Gluc: 154 mg/dL / Ketone: x  / Bili: x / Urobili: x   Blood: x / Protein: x / Nitrite: x   Leuk Esterase: x / RBC: x / WBC x   Sq Epi: x / Non Sq Epi: x / Bacteria: x        RADIOLOGY & ADDITIONAL TESTS: Reviewed.

## 2023-09-02 NOTE — PROGRESS NOTE ADULT - ATTENDING COMMENTS
74 yo F w/ T2DM, CKD, HFpEF, h/o CVA and trach and PEG (now removed) admitted to MICU for acute hypoxemic and hypercapnic respiratory failure requiring intubation in setting of RUSS and acute pulmonary edema, possible ATN vs AIN on prednisone, HD per renal, Acute hypoxemic and hypercapnic respiratory, s/p tracheostomy 9/1/23, pain control. Off precedex today, monitor anemia, had coffee ground emesis this week, s/p EGD with gastropathy. s/p last PRBC 8/31/23. CPAP trials as tolerated.

## 2023-09-02 NOTE — PHARMACOTHERAPY INTERVENTION NOTE - COMMENTS
Recommended to consult infectious diseases since the 9/1 blood culture grew MSSA.    Renato Zheng, PharmD, [facility]  Clinical Pharmacy Specialist, Infectious Diseases  Tele-Antimicrobial Stewardship Program (Tele-ASP)  Tele-ASP Phone: [phone]

## 2023-09-02 NOTE — PROGRESS NOTE ADULT - ASSESSMENT
72 y/o F well known to me from my [facility] outpt practice. she was admitted at [facility] 7/12-7/22 w aspiration PNA, was treated w CEFEPIME, developed an allergic rash,  dCHF, + MAC on AFB culture, had been progressively getting more and more lethargic and dyspneic at home since DC.   In  am of 8/11/23  ptn presented with respiratory distress w hypoxia and hypercarbia requiring intubation 2/2 volume overload +/- Asp PNA      Neuro   responds appropriately   Baseline MS AOx3, aphasic   - h/o CVA , on aspirin and statin . 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 probably embolic in nature, did not tolerate full AC in the past, STEPHANIE is neg , no shunts observed      Cardiac   Ptn well known to Dr. Dunn, consult reviewed   CHFpEF   TTE 7/2023 with EF 59%, with severe LVH and diastolic dysfunction   septic shock overnight, now on meropenem and Levophed, s/p trache, consider DC CARDURA    Pulmonary   Acute hypercapnic and hypoxemic respiratory failure   well known to Dr. Mcnulty, consult reviewed   s/p septic shock overnight 9/1, now on meropenem and Levophed,   s/p trache,      Renal   Hyperkalemia with EKG changes  , initially treated w LOKELMA,  now resolved   Ptn well know to Dr. Colon, consult reviewed    HD 8/19,  8/21, 8/26 and 8/28.  s/p PUF 8/29 2.5 Liters, HD 8/30,  9/1  cont HD as per renal  renal biopsy has been deferred , ASA  resumed, AC on hold    GI  NPO  on tube feeds  coffee ground emesis x 1 8/24, melena overnight, s/p 1UPRBC, EGD/Colonoscopy: erosive gastropathy, esophagisits, on colonoscopy old blood seen no active bleeding, poor prep    Endocrine  T2DM   A1C 7.1 in 7/2023   - BG goal 140-200     ID   No fever/leukocytosis, recheck temp   Hx latent TB which was treated, no concern for TB   - grew MAC on AFB culture from most recent admission. would call ID consult  Started on empiric vancomycin and aztreonam,  changed to cefepime 8/12, cefepime dced on 8/13(cefepime/zosyn allergy.  developed rash when on cefepime on previous admission ) . started on AVALOX on 8/13, off ABx on 8/14. ptn has an allergic rash, prob 2/2 cefepime  - f/u MRSA   - all cxs NTD    Heme/Onc  ppx: heparin q8 hrs     Ethics  GOC - Discussed GOC with daughter and , they have opted in the past for full code. and she remains full code at present

## 2023-09-02 NOTE — PROGRESS NOTE ADULT - ASSESSMENT
72 y/o F DM on insulin, HTN, CKD, CHFpEF, breast CA, respiratory arrest and cardiac arrest (2018), CVA with residual weakness, aspiration PNA h/o trache, PEG, both removed presents with respiratory distress requiring intubation. Found to have elevated BNP, may be 2/2 to volume overload i/s/o CKD vs CHF.     GI BLEED:  -now had gi bleed : hb dropped:   -s/p one unit of PRBC transfusion:   -on ppi and steroids too'; :  -for endoscopy now today   : s/p endoscopy:  seems OK: transfuse prn defer to MICU     Neuro ;  - Sedated : Baseline MS AOx3   -WAS ON PROPOFOL AND FENTANYL BEFORE: NOW OFF:    - Monitor her mental status:  requiring only 30% fio2:    -8/15: - now she has been off sedation for more then 24 hours but is still lethargic: her o2 requirement has not increased:   -for possible extubation if she wakes up   :: -dw PMD: pt is still lethargic  for MRI brain stem today   ": -had ct head: OK: awaiting MRI brain : still lethargic  : seemed same to me: family at bedside who says she is little  bit more awake: oxygen requirement is same:  at 30%:off vasopressors   -now neuro note reviewed:  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 reason for inability ot trigger vent and co2 retention initially ? candidate for trach    -now the co2 i s normal : not much improvement in her mental statis:  off sedation for days   /8/20: still lethargic: on precedex;  cxr reviewed:  last time at [facility] she was found to have cavity in RUL : she was ruled out for tb : her AFB was positive but was MAC /; defer to MICU   "  still sedated:  no sob:  on 30% fio2:  on precedex:  and is on nicardipine as well as labetalol ; for renal biopsy today  : seems OK: more alert and awake:  renal biopsy is still pending:  la nena family   : doing OK: alert and awake:  but still intubated:  getting cpap : no renal biopsy done   : notable to be weaned:  probably trach next week if she is not extubated by early next week : restarted on bumex  : doing same on cpap trials at this time:  cont iv diuresis per renal ; on predniosne for renal issues:   : seems OK: no sob:  no cugh : no phlegm : on cpap trial: ? extubate L vs trach    : she is hypercarbic acidotic today while being on cpap trials at 8  : the talk is ongoing for extubation trial or trach  : family seems  interested in reintubation in the event if she is extubated:   : pts family is still vacillating between trach or not/;  dw  in detail at bedside dw MICU ACP  : no finally no extubation  pt will go for trach in am : dw family   : remains same: now is awaiting trach: on 30% fio2: and is on full vent support   2023: seems OK: but overnight hypotensive and bradycardic now on pressors:  now trach is postponed:   : still lethargic:  but open  her eyes and per her  she does understands simple commands   CVA   - cont aspirin and statin   //-per neurology   : sedated  neuro followin/23:edation:  seems to be doing better:  plan for extubation if family refused for biopsy   : more awake and alert :   : stable  : she is awake but is lethargic  : she is little bit more alert today  :   : mentally she is doing same:  still lethargic but open eyes and respond to questions  : she seems lethargic today  : o n fuill vent support   : sedated on full vent support  : on precedx:  cont full vent support now awaiting trach    91: trach cancelled secondary to pt become hypotensive   : s/p trach yesterday  : bedside: renocutaneous :   Cardiac   -CHFpEF : TTE 2023 with EF 59%, with severe LVH and diastolic dysfunction : pro-BNP > 31052, trop 78   -on bumex drip :  -cards following   8/15: : she is off bumex drip and is on midodrine   : remains off bumex  :: off bumex:  defer to MICU team   cont to remain off diuretics   : she seems same to me: in renal failure off diuretics   : per Micu   : seems stable  : back  on bumex drip    : cont bumex drip    : diuretics per renal   : she is due for hd today  : dw renal : removing 3 L of fluid today  :   : on bumex drip : HD per renal   : per re nal    : off diuretics:  on hd intermittently:  defer to renal    1:  off diuretics: on hd per renal : monitor urine output   Pulmonary   -Acute hypercapnic and hypoxemic respiratory failure   -DDx: aspiration vs volume overload  VBG with respiratory acidosis pCO2 111  - CXR with small right pleural effusion, no consolidations/opacities  -now she seems better:  fio2: 30$:  -? etiology of hypercarbia:  multifactorial : seems to have OHS and TYRONE superimposed by acute chf  ? and aspiration pneumonia:"   -pt is mostly bed bound:   -it is possible that this time:  she mght need bipap on dc : atleast at the night time:   -on 8/15:  still remains intubated  :still lethargic:  not extubated hypercarbic acidotic today on abg:  weaning trials :probably would need bipap following extubation  : :still lethargic: :awaiting mri brain : dw    : :MRI done has new right cerebellar infarct on mri : defer to MICU   -seen by bora now:  has brain stem infarct to per neuro note:  reason for being vent;    : awaiting renal bipsy today  : and then aggressive weaning  : now no renal biopsy :    : still sightly hypoxic but good sao2:  cont to wean and extubate if needed  still has AMS   :? extubation vs trach   : being weaned: plan as above   : she is on full vent support:  weaning as tolerated:  but now seems heading towards trach   : now for trach ellen rojas    : awaiting trach : o2 requirement has not increased:   : on vasopressors: cont supportive care  2: still on vasopressors:  and precedex:    /Renal   -Hyperkalemia with EKG changes  : 7.2, hemolyzed, given insulin and D50 + calcium gluc   -K is better today : cont to monitor  -normal today on 8/15   8/16:stable  :-k has been ok for l ast few days  : stable  : started on prednisone yesterday for suspected AIN by renal    : wbc is high : likely secondary to steroids  she has no fveR:  ruled out for tb last time   : for renal biopsy today   : RENAL  BIOPSY NOT DONE:  FAMILY IS THINKING about itl   : biopsy not done: urine output increased:   : doing  ok : no sob:  no biopsy for now:   : per primary pulm tea in MICU   : on cpap trial: hypercarbic today  :   : on full vent suport: uniley that she will wean and remains extubated for long time:   ? trach    : HD per renal   : HD renal   : on prednisone per renal    Endocrine  T2DM : A1C 7.1 in 2023   -cont to monitor blood glucose  ID   - No fever/leukocytosis  - Hx latent TB which was treated, no concern for TB  : She was recently ruled out for tuberculosis at [facility]   - Started on empiric vancomycin and aztreonam (cefepime/zosyn allergy? developed rash req prednisone)  : now dced:   defer to MICU   - f/u MRSA   - follow up blood culture/urine   :blood cultures negative so far: legionella is negative:  remains off antibiotics  staph aureus on sputum   :: off antibiotics at this time: secondary to drug eruption  : remains off antibiotics   -: off diuretics   : off antibiotics   : off antibiotics   : off antibiotics   : off antibtiocs  : remains afebrile:   : off antibiotics  :  remains off antibiotics    dw micu and  at bedside   overall prognosis seems guarded

## 2023-09-02 NOTE — PROGRESS NOTE ADULT - ASSESSMENT
72 y/o F DM on insulin, HTN, CKD, CHFpEF, breast CA, respiratory arrest and cardiac arrest (2018), CVA with residual weakness, aspiration PNA h/o trache, PEG, both removed presents with respiratory distress requiring intubation. May be volume overload i/s/o CHF/CKD vs decrease respiratory drive (given oxygen at home). Improving mental status however worsening renal function and anuria, concerning for ATN vs AIN.     Neuro   #AMS  #Metabolic encephalopathy   #CVA   Baseline MS AOx3 with short term memory changes per family  - MRI brain 8/17 showed right cerebellar infarct (new) with old left PCA/occipital infarcts, likely embolic in nature - STEPHANIE with no TRINITY thrombus or intracardiac shunts  - Now with mild sedation with Precedex for ventilator synchrony  - Intermittent shaking noted, per family happens at baseline but more pronounced here; EEG without epileptiform activity, per neuro no need for LP  - Restarted aspirin 8/26 - held 8/31 iso bleed - if stable will restart    Cardiac   Labile blood pressure - ranging between SBP 80s-200s on 8/31  - Labetalol 600 TID - overnight became hypotensive/bradycardic ? iso BB toxicity. Labetalol is renally cleared, consider restarting carvedilol if becomes hypertensive  - Clonidine increased to 0.3 TID - held as on pressors  - Restarted home Cardura - held as on pressors      #CHFpEF   - TTE 7/2023 with EF 59%, with severe LVH and diastolic dysfunction   - pro-BNP > 66508, trop 78   - Repeat TTE with EF 60% normal systolic and grade 1 diastolic dysfunction     Pulmonary   #Acute hypercapnic and hypoxemic respiratory failure   - DDx: aspiration vs volume overload vs sedating medication decreasing drive (was given nyquil)   - Has not tolerated multiple CPAP trials, plan for trach today    /Renal   #RUSS on CKD  - Ddx: obstructive (willard in, no hydro on POCUS) vs low flow state vs AIN i/s/o cephalosporin use and drug rash   - kidney biopsy to r/o AIN planned 8/22, however family felt procedure too risky and so deferred  - cont empiric prednisone 40mg started 8/18   - Continue to monitor, dose meds per eGFR. avoid nephrotoxic agents  - No HD today  - Strict IOs per nephro; Willard in place for UOP monitoring  - c/w prednisone 40 for 14 days per nephro for AIN, then taper (8/18-9/1 then taper)    # Hyperphosphatemia  - Continue to monitor BMP q12  - Started on renvela powder TID per nephro recs    GI  - Had one episode of coffee ground emesis 8/24, two episodes melena 8/31  - GI consulted, scope planned for today  - Continue PPI IV BID iso recent CGE, will transition to PO post extubation  - NPO for procedure    Endocrine  #T2DM   - ISS  - NPH DC'd while NPO, will restart when tube feeds restarted    ID   Started on empiric vancomycin and aztreonam (cefepime/zosyn allergy? developed rash req prednisone)   Trialed cefepime --> cefazolin (sputum MSSA), developed drug eruption - discontinued   UA +, Willard DC'd  Per ID pharmacist, consider meropenem    Heme/Onc  ppx: heparin q8 hrs - held iso bleed, if stable will restart    Ethics  GOC - Per previous GOC with daughter and , reported that they have not talked about end of life care with the patient. For now, they wanted "everything to be done" including CPR, continuing with mech ventilation/intubation, pressors

## 2023-09-02 NOTE — PROGRESS NOTE ADULT - SUBJECTIVE AND OBJECTIVE BOX
Date of Service: 09-02-23 @ 11:05    Patient is a 73y old  Female who presents with a chief complaint of Respiratory distress (02 Sep 2023 08:05)      Any change in ROS: s/p trach yesterday : pt is more awake and open her eyes:  at bedside:       MEDICATIONS  (STANDING):  albuterol/ipratropium for Nebulization 3 milliLiter(s) Nebulizer every 8 hours  atorvastatin 40 milliGRAM(s) Oral at bedtime  chlorhexidine 0.12% Liquid 15 milliLiter(s) Oral Mucosa every 12 hours  chlorhexidine 2% Cloths 1 Application(s) Topical daily  cloNIDine 0.3 milliGRAM(s) Oral every 8 hours  dexMEDEtomidine Infusion 1 MICROgram(s)/kG/Hr (16.6 mL/Hr) IV Continuous <Continuous>  dextrose 5%. 1000 milliLiter(s) (50 mL/Hr) IV Continuous <Continuous>  dextrose 5%. 1000 milliLiter(s) (100 mL/Hr) IV Continuous <Continuous>  dextrose 50% Injectable 25 Gram(s) IV Push once  dextrose 50% Injectable 25 Gram(s) IV Push once  dextrose 50% Injectable 12.5 Gram(s) IV Push once  doxazosin 6 milliGRAM(s) Oral <User Schedule>  glucagon  Injectable 1 milliGRAM(s) IntraMuscular once  insulin lispro (ADMELOG) corrective regimen sliding scale   SubCutaneous every 6 hours  insulin NPH human recombinant 1 Unit(s) SubCutaneous every 6 hours  meropenem  IVPB 500 milliGRAM(s) IV Intermittent every 12 hours  norepinephrine Infusion 0.05 MICROgram(s)/kG/Min (3.12 mL/Hr) IV Continuous <Continuous>  pantoprazole  Injectable 40 milliGRAM(s) IV Push two times a day  predniSONE   Tablet 40 milliGRAM(s) Oral daily  sevelamer carbonate Powder 1600 milliGRAM(s) Oral three times a day  tranexamic acid Injectable for Topical Use 10 milliLiter(s) Topical once    MEDICATIONS  (PRN):  acetaminophen   Oral Liquid .. 650 milliGRAM(s) Oral every 6 hours PRN Temp greater or equal to 38C (100.4F), Mild Pain (1 - 3)  dextrose Oral Gel 15 Gram(s) Oral once PRN Blood Glucose LESS THAN 70 milliGRAM(s)/deciliter    Vital Signs Last 24 Hrs  T(C): 37.4 (02 Sep 2023 08:00), Max: 37.5 (02 Sep 2023 04:00)  T(F): 99.3 (02 Sep 2023 08:00), Max: 99.5 (02 Sep 2023 04:00)  HR: 85 (02 Sep 2023 10:54) (54 - 85)  BP: --  BP(mean): --  RR: 19 (02 Sep 2023 10:00) (12 - 27)  SpO2: 92% (02 Sep 2023 10:54) (90% - 100%)    Parameters below as of 02 Sep 2023 10:00  Patient On (Oxygen Delivery Method): ventilator    O2 Concentration (%): 30  Mode: AC/ CMV (Assist Control/ Continuous Mandatory Ventilation)  RR (machine): 12  TV (machine): 360  FiO2: 30  PEEP: 5  ITime: 0.89  MAP: 13  PIP: 42    I&O's Summary    01 Sep 2023 07:01  -  02 Sep 2023 07:00  --------------------------------------------------------  IN: 1631.4 mL / OUT: 430 mL / NET: 1201.4 mL    02 Sep 2023 07:01  -  02 Sep 2023 11:05  --------------------------------------------------------  IN: 220 mL / OUT: 0 mL / NET: 220 mL          Physical Exam:   GENERAL: NAD, well-groomed, well-developed  HEENT: MANDY/   Atraumatic, Normocephalic  ENMT: No tonsillar erythema, exudates, or enlargement; Moist mucous membranes, Good dentition, No lesions  NECK: Supple, No JVD, Normal thyroid  CHEST/LUNG: Clear to auscultaion  CVS: Regular rate and rhythm; No murmurs, rubs, or gallops  GI: : Soft, Nontender, Nondistended; Bowel sounds present  NERVOUS SYSTEM:  lethargic but awake  EXTREMITIES:  + edema  LYMPH: No lymphadenopathy noted  SKIN: No rashes or lesions  ENDOCRINOLOGY: No Thyromegaly  PSYCH: Appropriate    Labs:  ABG - ( 02 Sep 2023 00:20 )  pH, Arterial: 7.38  pH, Blood: x     /  pCO2: 48    /  pO2: 77    / HCO3: 28    / Base Excess: 2.9   /  SaO2: 97.2                                        7.1    6.34  )-----------( 120      ( 02 Sep 2023 00:20 )             22.1                         7.6    10.90 )-----------( 158      ( 01 Sep 2023 14:30 )             23.0                         7.4    5.95  )-----------( 146      ( 01 Sep 2023 05:30 )             22.8                         7.2    6.47  )-----------( 145      ( 01 Sep 2023 00:35 )             22.0                         7.5    8.52  )-----------( 145      ( 31 Aug 2023 13:00 )             22.9                         7.5    8.96  )-----------( 158      ( 31 Aug 2023 06:00 )             22.5                         7.4    10.85 )-----------( 162      ( 31 Aug 2023 03:00 )             22.4                         6.2    10.91 )-----------( 173      ( 31 Aug 2023 00:15 )             19.0     09-02    138  |  101  |  26<H>  ----------------------------<  154<H>  3.6   |  28  |  1.19  09-01    134<L>  |  98  |  59<H>  ----------------------------<  260<H>  3.6   |  25  |  2.14<H>  08-31    135  |  98  |  42<H>  ----------------------------<  348<H>  3.9   |  24  |  1.75<H>  08-30    135  |  97<L>  |  57<H>  ----------------------------<  402<H>  3.3<L>   |  23  |  2.29<H>    Ca    8.0<L>      02 Sep 2023 00:20  Ca    7.9<L>      01 Sep 2023 00:35  Phos  2.0     09-02  Phos  3.3     09-01  Mg     1.80     09-02  Mg     1.60     09-01    TPro  5.1<L>  /  Alb  2.0<L>  /  TBili  0.3  /  DBili  x   /  AST  11  /  ALT  11  /  AlkPhos  81  09-02  TPro  4.9<L>  /  Alb  2.0<L>  /  TBili  0.2  /  DBili  x   /  AST  9   /  ALT  10  /  AlkPhos  84  09-01  TPro  5.0<L>  /  Alb  2.2<L>  /  TBili  0.2  /  DBili  x   /  AST  12  /  ALT  11  /  AlkPhos  94  08-31  TPro  5.6<L>  /  Alb  2.5<L>  /  TBili  0.2  /  DBili  x   /  AST  9   /  ALT  10  /  AlkPhos  123<H>  08-30    CAPILLARY BLOOD GLUCOSE      POCT Blood Glucose.: 192 mg/dL (02 Sep 2023 05:22)  POCT Blood Glucose.: 147 mg/dL (01 Sep 2023 23:15)  POCT Blood Glucose.: 119 mg/dL (01 Sep 2023 17:35)  POCT Blood Glucose.: 104 mg/dL (01 Sep 2023 11:27)      LIVER FUNCTIONS - ( 02 Sep 2023 00:20 )  Alb: 2.0 g/dL / Pro: 5.1 g/dL / ALK PHOS: 81 U/L / ALT: 11 U/L / AST: 11 U/L / GGT: x           PT/INR - ( 02 Sep 2023 00:20 )   PT: 13.1 sec;   INR: 1.18 ratio         PTT - ( 02 Sep 2023 00:20 )  PTT:25.5 sec  Urinalysis Basic - ( 02 Sep 2023 00:20 )    Color: x / Appearance: x / SG: x / pH: x  Gluc: 154 mg/dL / Ketone: x  / Bili: x / Urobili: x   Blood: x / Protein: x / Nitrite: x   Leuk Esterase: x / RBC: x / WBC x   Sq Epi: x / Non Sq Epi: x / Bacteria: x            RECENT CULTURES:  09-01 @ 16:50 .Bronchial Bronchial Lavage       Numerous polymorphonuclear leukocytes per low power field  No squamous epithelial cells per low power field  Numerous Gram Positive Cocci in Clusters per oil power field  Few Gram Negative Rods per oil power field             09-01 @ 05:30 ET Tube ET Tube       Numerous polymorphonuclear leukocytes per low power field  No Squamous epithelial cells per low power field  Numerous Gram positive cocci in pairs per oil power field  Moderate Gram Negative Rods per oil power field  Few Gram Positive Rods per oil power field           < from: Xray Chest 1 View- PORTABLE-Urgent (Xray Chest 1 View- PORTABLE-Urgent .) (09.01.23 @ 13:28) >  placement.    EXAM: Chest x-ray one view.    COMPARISON: Chest x-ray 8/31/2023.    FINDINGS:  Since the last study, tracheostomy tube is been placed and there is no   complicating extra alveolar air.    Right-sided Shiley catheter terminating in the SVC.  Bilateral interstitial opacities, with interval increase in right-sided   opacities compared to prior imaging.  Interval development of trace left-sided pleural effusion.  The heart is unable to be assessed.  Vascular calcifications and loop recorder are present.      IMPRESSION: Status post tracheostomyplacement with no complicating   pneumomediastinum or pneumothorax.        --- End of Report ---    < end of copied text >          RESPIRATORY CULTURES:          Studies  Chest X-RAY  CT SCAN Chest   Venous Dopplers: LE:   CT Abdomen  Others

## 2023-09-02 NOTE — PROGRESS NOTE ADULT - SUBJECTIVE AND OBJECTIVE BOX
Overnight events noted. trach to vent       VITAL:  T(C): , Max: 37.7 (09-02-23 @ 16:00)  T(F): , Max: 99.8 (09-02-23 @ 16:00)  HR: 83 (09-02-23 @ 16:00)  BP: --  BP(mean): --  RR: 24 (09-02-23 @ 16:00)  SpO2: 94% (09-02-23 @ 16:00)  Wt(kg): --      PHYSICAL EXAM:  Constitutional: sedated on Precedex  HEENT: (+)NGT  Neck: (+)trach-vent  Respiratory: coarse BS b/l  Cardiovascular: ambika s1s2  Gastrointestinal: BS+, soft, NT/ND  Extremities: + peripheral edema  Neurological: tone WNL  : + Wheeler  Skin: No rashes  Access: [facility] HD catheter (8/19)      LABS:                        7.1    6.34  )-----------( 120      ( 02 Sep 2023 00:20 )             22.1     Na(138)/K(3.6)/Cl(101)/HCO3(28)/BUN(26)/Cr(1.19)Glu(154)/Ca(8.0)/Mg(1.80)/PO4(2.0)    09-02 @ 00:20  Na(134)/K(3.6)/Cl(98)/HCO3(25)/BUN(59)/Cr(2.14)Glu(260)/Ca(7.9)/Mg(1.60)/PO4(3.3)    09-01 @ 00:35  Na(135)/K(3.9)/Cl(98)/HCO3(24)/BUN(42)/Cr(1.75)Glu(348)/Ca(8.0)/Mg(1.70)/PO4(2.4)    08-31 @ 00:15    Urinalysis Basic - ( 02 Sep 2023 00:20 )    Color: x / Appearance: x / SG: x / pH: x  Gluc: 154 mg/dL / Ketone: x  / Bili: x / Urobili: x   Blood: x / Protein: x / Nitrite: x   Leuk Esterase: x / RBC: x / WBC x   Sq Epi: x / Non Sq Epi: x / Bacteria: x                  Jimmy Colon MD  [facility]  Office: [phone]  Cell: [phone]       Overnight events noted. trach to vent       VITAL:  T(C): , Max: 37.7 (09-02-23 @ 16:00)  T(F): , Max: 99.8 (09-02-23 @ 16:00)  HR: 83 (09-02-23 @ 16:00)  BP: --  BP(mean): --  RR: 24 (09-02-23 @ 16:00)  SpO2: 94% (09-02-23 @ 16:00)  Wt(kg): --      PHYSICAL EXAM:  Constitutional: sedated on Precedex  HEENT: (+)NGT  Neck: (+)trach-vent  Respiratory: coarse BS b/l  Cardiovascular: ambika s1s2  Gastrointestinal: BS+, soft, NT/ND  Extremities: + peripheral edema  Neurological: tone WNL  : no Wheeler  Skin: No rashes  Access: [facility] HD catheter (8/19)      LABS:                        7.1    6.34  )-----------( 120      ( 02 Sep 2023 00:20 )             22.1     Na(138)/K(3.6)/Cl(101)/HCO3(28)/BUN(26)/Cr(1.19)Glu(154)/Ca(8.0)/Mg(1.80)/PO4(2.0)    09-02 @ 00:20  Na(134)/K(3.6)/Cl(98)/HCO3(25)/BUN(59)/Cr(2.14)Glu(260)/Ca(7.9)/Mg(1.60)/PO4(3.3)    09-01 @ 00:35  Na(135)/K(3.9)/Cl(98)/HCO3(24)/BUN(42)/Cr(1.75)Glu(348)/Ca(8.0)/Mg(1.70)/PO4(2.4)    08-31 @ 00:15    Urinalysis Basic - ( 02 Sep 2023 00:20 )    Color: x / Appearance: x / SG: x / pH: x  Gluc: 154 mg/dL / Ketone: x  / Bili: x / Urobili: x   Blood: x / Protein: x / Nitrite: x   Leuk Esterase: x / RBC: x / WBC x   Sq Epi: x / Non Sq Epi: x / Bacteria: x                  Jimmy Colon MD  [facility]  Office: [phone]  Cell: [phone]       Overnight events noted. trach to vent       VITAL:  T(C): , Max: 37.7 (09-02-23 @ 16:00)  T(F): , Max: 99.8 (09-02-23 @ 16:00)  HR: 83 (09-02-23 @ 16:00)  BP: --  BP(mean): --  RR: 24 (09-02-23 @ 16:00)  SpO2: 94% (09-02-23 @ 16:00)  Wt(kg): --      PHYSICAL EXAM:  Constitutional: sedated on Precedex  HEENT: (+)NGT  Neck: (+)trach-vent  Respiratory: coarse BS b/l  Cardiovascular: ambika s1s2  Gastrointestinal: BS+, soft, NT/ND  Extremities: + peripheral edema  Neurological: tone WNL  : no Wheeler  Skin: No rashes  Access: [facility] HD catheter (8/19)      LABS:                        7.1    6.34  )-----------( 120      ( 02 Sep 2023 00:20 )             22.1     Na(138)/K(3.6)/Cl(101)/HCO3(28)/BUN(26)/Cr(1.19)Glu(154)/Ca(8.0)/Mg(1.80)/PO4(2.0)    09-02 @ 00:20  Na(134)/K(3.6)/Cl(98)/HCO3(25)/BUN(59)/Cr(2.14)Glu(260)/Ca(7.9)/Mg(1.60)/PO4(3.3)    09-01 @ 00:35  Na(135)/K(3.9)/Cl(98)/HCO3(24)/BUN(42)/Cr(1.75)Glu(348)/Ca(8.0)/Mg(1.70)/PO4(2.4)    08-31 @ 00:15    Urinalysis Basic - ( 02 Sep 2023 00:20 )    Color: x / Appearance: x / SG: x / pH: x  Gluc: 154 mg/dL / Ketone: x  / Bili: x / Urobili: x   Blood: x / Protein: x / Nitrite: x   Leuk Esterase: x / RBC: x / WBC x   Sq Epi: x / Non Sq Epi: x / Bacteria: x

## 2023-09-02 NOTE — PROGRESS NOTE ADULT - SUBJECTIVE AND OBJECTIVE BOX
Subjective: Patient seen and examined. No new events except as noted.   s/p trach.  Daughter at bedside.     REVIEW OF SYSTEMS:  Unable to obtain.    MEDICATIONS:  MEDICATIONS  (STANDING):  albuterol/ipratropium for Nebulization 3 milliLiter(s) Nebulizer every 8 hours  atorvastatin 40 milliGRAM(s) Oral at bedtime  chlorhexidine 0.12% Liquid 15 milliLiter(s) Oral Mucosa every 12 hours  chlorhexidine 2% Cloths 1 Application(s) Topical daily  cloNIDine 0.3 milliGRAM(s) Oral every 8 hours  dexMEDEtomidine Infusion 1 MICROgram(s)/kG/Hr (16.6 mL/Hr) IV Continuous <Continuous>  dextrose 5%. 1000 milliLiter(s) (50 mL/Hr) IV Continuous <Continuous>  dextrose 5%. 1000 milliLiter(s) (100 mL/Hr) IV Continuous <Continuous>  dextrose 50% Injectable 12.5 Gram(s) IV Push once  dextrose 50% Injectable 25 Gram(s) IV Push once  dextrose 50% Injectable 25 Gram(s) IV Push once  doxazosin 6 milliGRAM(s) Oral <User Schedule>  fentaNYL    Injectable 200 MICROGram(s) IV Push once  glucagon  Injectable 1 milliGRAM(s) IntraMuscular once  insulin lispro (ADMELOG) corrective regimen sliding scale   SubCutaneous every 6 hours  insulin NPH human recombinant 5 Unit(s) SubCutaneous every 6 hours  labetalol 600 milliGRAM(s) Oral three times a day  midazolam Injectable 6 milliGRAM(s) IV Push once  niCARdipine Infusion 2.5 mG/Hr (12.5 mL/Hr) IV Continuous <Continuous>  norepinephrine Infusion 0.05 MICROgram(s)/kG/Min (3.12 mL/Hr) IV Continuous <Continuous>  pantoprazole  Injectable 40 milliGRAM(s) IV Push two times a day  predniSONE   Tablet 40 milliGRAM(s) Oral daily  sevelamer carbonate Powder 1600 milliGRAM(s) Oral three times a day    PHYSICAL EXAM:  Vital Signs Last 24 Hrs  T(C): 37.5 (02 Sep 2023 04:00), Max: 37.5 (02 Sep 2023 04:00)  T(F): 99.5 (02 Sep 2023 04:00), Max: 99.5 (02 Sep 2023 04:00)  HR: 69 (02 Sep 2023 07:00) (54 - 76)  BP: --  BP(mean): --  RR: 22 (02 Sep 2023 07:00) (12 - 27)  SpO2: 94% (02 Sep 2023 07:00) (90% - 100%)    Parameters below as of 02 Sep 2023 07:00  Patient On (Oxygen Delivery Method): ventilator    O2 Concentration (%): 30    I&O's Summary    01 Sep 2023 07:01  -  02 Sep 2023 07:00  --------------------------------------------------------  IN: 1577.4 mL / OUT: 430 mL / NET: 1147.4 mL    Appearance: NAD, +trach  HEENT: dry oral mucosa  Lymphatic: No lymphadenopathy  Cardiovascular: Normal S1 S2, No JVD, No murmurs, No edema  Respiratory: Decreased BS, + trach to vent	  Neuro: opens eyes  Gastrointestinal: Soft, Non-tender, + BS, + OGT	  Skin: No rashes, No ecchymoses, No cyanosis	  Extremities: No strength/ROM 2/2 sedation, + BL LE edema  Vascular: Peripheral pulses palpable 2+ bilaterally    LABS:    CARDIAC MARKERS:                        7.1    6.34  )-----------( 120      ( 02 Sep 2023 00:20 )             22.1     09-02    138  |  101  |  26<H>  ----------------------------<  154<H>  3.6   |  28  |  1.19    Ca    8.0<L>      02 Sep 2023 00:20  Phos  2.0     09-02  Mg     1.80     09-02    TPro  5.1<L>  /  Alb  2.0<L>  /  TBili  0.3  /  DBili  x   /  AST  11  /  ALT  11  /  AlkPhos  81  09-02    proBNP:   Lipid Profile:   HgA1c:   TSH:     TELEMETRY: SR    ECG:  	  RADIOLOGY:   DIAGNOSTIC TESTING:  [ ] Echocardiogram:  [ ]  Catheterization:  [ ] Stress Test:    OTHER:

## 2023-09-02 NOTE — PROGRESS NOTE ADULT - ASSESSMENT
IMPRESSION: 73F w/ HTN, DM2, CVA, breast CA-bilateral mastectomy, recurrent aspiration pneumonia/respiratory failure, and CKD, 8/11/23 p/w acute hypercapnic respiratory failure; c/b RUSS    (1)CKD - stage 4     (2)RUSS - borderline oliguric AIN vs ATN - PUF yesterday, HD today     (3)A/I - ?AIN - on empiric Prednisone 40mg qd x past 2 weeks - we had planned to start tapering today    (4)Lytes - acceptable    (5)Pulm- hypercapnic respiratory failure on admission - now s/p trach; remains on vent    (6)CV - newly in shock as of last night/on pressors      RECOMMEND:  (1)HD Monday- escalation of pressors as needed to keep SBP>90 on HD  (2)No objection to starting to taper Prednisone  (3)Dose new meds for GFR ~15ml/min       IMPRESSION: 73F w/ HTN, DM2, CVA, breast CA-bilateral mastectomy, recurrent aspiration pneumonia/respiratory failure, and CKD, 8/11/23 p/w acute hypercapnic respiratory failure; c/b RUSS    (1)CKD - stage 4     (2)RUSS - borderline oliguric AIN vs ATN -HD yesterday     (3)A/I - ?AIN - on empiric Prednisone 40mg qd x past 2 weeks - we had planned to start tapering today    (4)Lytes - acceptable    (5)Pulm- hypercapnic respiratory failure on admission - now s/p trach; remains on vent    (6)CV - newly in shock as of last night/on pressors- now off pressors       RECOMMEND:  (1) assess need for HD Monday- escalation of pressors as needed to keep SBP>90 on HD  (2)No objection to starting to taper Prednisone  (3)Dose new meds for GFR ~15ml/min

## 2023-09-03 NOTE — DISCHARGE NOTE PROVIDER - DETAILS OF MALNUTRITION DIAGNOSIS/DIAGNOSES
This patient has been assessed with a concern for Malnutrition and was treated during this hospitalization for the following Nutrition diagnosis/diagnoses:     -  08/31/2023: Severe protein-calorie malnutrition

## 2023-09-03 NOTE — DISCHARGE NOTE PROVIDER - NSDCMRMEDTOKEN_GEN_ALL_CORE_FT
aspirin 81 mg oral delayed release tablet: 1 tab(s) orally once a day  atorvastatin 10 mg oral tablet: 1 tab(s) orally once a day  budesonide 0.5 mg/2 mL inhalation suspension: 2 milliliter(s) by nebulizer once a day  carvedilol 12.5 mg oral tablet: 3 tab(s) orally 2 times a day  cholecalciferol 50 mcg (2000 intl units) oral tablet: 1 tab(s) orally once a day  clobetasol 0.05% topical ointment: Apply topically to affected area 2 times a day 1 Apply topically to affected area 2 times a day  cloNIDine 0.2 mg oral tablet: 1 tab(s) orally 2 times a day  commode: use for toileting as needed  donepezil 10 mg oral tablet: 1 tab(s) orally once a day (at bedtime)  doxazosin 8 mg oral tablet: 1 tab(s) orally once a day (at bedtime)  ferrous sulfate 325 mg (65 mg elemental iron) oral tablet: 1 tab(s) orally once a day  Wilian lift: use to get pt. oob  Incruse Ellipta 62.5 mcg/inh inhalation powder: 1 inhaled  ipratropium 42 mcg/inh (0.06%) nasal spray: 1 spray(s) intranasally 2 times a day  ipratropium-albuterol 0.5 mg-2.5 mg/3 mL inhalation solution: 3 milliliter(s) inhaled every 6 hours  Januvia 25 mg oral tablet: 1 tab(s) orally once a day  Lantus Solostar Pen 100 units/mL subcutaneous solution: 34 unit(s) subcutaneous once a day (at bedtime)  letrozole 2.5 mg oral tablet: 1 tab(s) orally once a day  Nephro-Aktherine oral tablet: 1 tab(s) orally once a day  predniSONE 10 mg oral tablet: 1 tab(s) orally once a day Take 20 mg (2 pills) once on 7/23/23  Take 10 mg (1 pill) once on 7/24/23  PreserVision AREDS 2 oral capsule: 1 cap(s) orally once a day  Reclining Wheelchair: for mobility  sodium chloride 0.9% inhalation solution: 3 milliliter(s) inhaled every 6 hours as needed for  shortness of breath and/or wheezing  Victoza 18 mg/3 mL subcutaneous solution: 1.2 subcutaneously

## 2023-09-03 NOTE — PROGRESS NOTE ADULT - SUBJECTIVE AND OBJECTIVE BOX
Subjective: Patient seen and examined. No new events except as noted.   remains in ICU     REVIEW OF SYSTEMS:  Unable to obtain     MEDICATIONS:  MEDICATIONS  (STANDING):  albuterol/ipratropium for Nebulization 3 milliLiter(s) Nebulizer every 8 hours  atorvastatin 40 milliGRAM(s) Oral at bedtime  chlorhexidine 0.12% Liquid 15 milliLiter(s) Oral Mucosa every 12 hours  chlorhexidine 2% Cloths 1 Application(s) Topical daily  dextrose 5%. 1000 milliLiter(s) (50 mL/Hr) IV Continuous <Continuous>  dextrose 5%. 1000 milliLiter(s) (100 mL/Hr) IV Continuous <Continuous>  dextrose 50% Injectable 12.5 Gram(s) IV Push once  dextrose 50% Injectable 25 Gram(s) IV Push once  dextrose 50% Injectable 25 Gram(s) IV Push once  doxazosin 6 milliGRAM(s) Oral <User Schedule>  glucagon  Injectable 1 milliGRAM(s) IntraMuscular once  insulin lispro (ADMELOG) corrective regimen sliding scale   SubCutaneous every 6 hours  insulin NPH human recombinant 1 Unit(s) SubCutaneous every 6 hours  meropenem  IVPB 500 milliGRAM(s) IV Intermittent every 12 hours  pantoprazole  Injectable 40 milliGRAM(s) IV Push two times a day  predniSONE   Tablet 30 milliGRAM(s) Oral daily  sevelamer carbonate Powder 1600 milliGRAM(s) Oral three times a day  tranexamic acid Injectable for Topical Use 10 milliLiter(s) Topical once      PHYSICAL EXAM:  T(C): 37.2 (09-03-23 @ 04:33), Max: 37.8 (09-02-23 @ 22:30)  HR: 95 (09-03-23 @ 07:49) (77 - 98)  BP: 120/35 (09-03-23 @ 02:57) (98/47 - 138/62)  RR: 12 (09-03-23 @ 04:33) (12 - 25)  SpO2: 96% (09-03-23 @ 07:49) (92% - 100%)  Wt(kg): --  I&O's Summary    02 Sep 2023 07:01  -  03 Sep 2023 07:00  --------------------------------------------------------  IN: 1050 mL / OUT: 0 mL / NET: 1050 mL            Appearance: NAD, +trach  HEENT: dry oral mucosa  Lymphatic: No lymphadenopathy  Cardiovascular: Normal S1 S2, No JVD, No murmurs, No edema  Respiratory: Decreased BS, + trach to vent	  Neuro: opens eyes  Gastrointestinal: Soft, Non-tender, + BS, + OGT	  Skin: No rashes, No ecchymoses, No cyanosis	  Extremities: No strength/ROM 2/2 sedation, + BL LE edema  Vascular: Peripheral pulses palpable 2+ bilaterally        LABS:    CARDIAC MARKERS:    Blood Gas Profile - Arterial (09.02.23 @ 00:20)   pH, Arterial: 7.38  pCO2, Arterial: 48 mmHg  pO2, Arterial: 77 mmHg  HCO3, Arterial: 28 mmol/L  Base Excess, Arterial: 2.9 mmol/L  Oxygen Saturation, Arterial: 97.2 %  Total CO2, Arterial: 30 mmol/L  FIO2, Arterial: 30Blood Gas Arterial - Calcium, Ionized: 1.23 mmol/L (09.02.23 @ 00:20) < from: Xray Chest 1 View- PORTABLE-Urgent (Xray Chest 1 View- PORTABLE-Urgent .) (09.01.23 @ 13:28) >    ACC: [id number] EXAM:  XR CHEST PORTABLE URGENT 1V   ORDERED BY: ALEM BARNARD     PROCEDURE DATE:  09/01/2023          INTERPRETATION:  CLINICAL INDICATION: Evaluation status post trach   placement.    EXAM: Chest x-ray one view.    COMPARISON: Chest x-ray 8/31/2023.    FINDINGS:  Since the last study, tracheostomy tube is been placed and there is no   complicating extra alveolar air.    Right-sided Shiley catheter terminating in the SVC.  Bilateral interstitial opacities, with interval increase in right-sided   opacities compared to prior imaging.  Interval development of trace left-sided pleural effusion.  The heart is unable to be assessed.  Vascular calcifications and loop recorder are present.      IMPRESSION: Status post tracheostomyplacement with no complicating   pneumomediastinum or pneumothorax.          < end of copied text >                              7.1    7.64  )-----------( 144      ( 03 Sep 2023 06:49 )             22.6     09-03    137  |  99  |  42<H>  ----------------------------<  233<H>  4.3   |  25  |  1.89<H>    Ca    8.4      03 Sep 2023 06:49  Phos  4.0     09-03  Mg     2.10     09-03    TPro  5.1<L>  /  Alb  2.0<L>  /  TBili  0.3  /  DBili  x   /  AST  11  /  ALT  11  /  AlkPhos  81  09-02        TELEMETRY: 	  SR  ECG:  	  RADIOLOGY:   DIAGNOSTIC TESTING:  [ ] Echocardiogram:  [ ]  Catheterization:  [ ] Stress Test:    OTHER:

## 2023-09-03 NOTE — PROGRESS NOTE ADULT - ASSESSMENT
73 year old female with intermittent episodes of epistaxis, DM on insulin, HTN, CKD, CHFpEF, breast CA s/p mastectomy, Latent Tb, respiratory failure and cardiac arrest (2018), CVA with residual weakness, aspiration PNA h/o trach and prior PEG for dysphagia in 2018, both removed who initially presented to the ED with respiratory distress requiring intubation and remains intubated, PNA-Staph aureus, OGT placed with worsening renal function and concerns for ATN vs AIN, started on prednisone 40 on 8/18 /w/o PPI.    #PEG evaluation  -EGD/colonoscopy 8/31/2023 reveals LA Grade D esophagitis. Suspect likely etiology of prior coffee ground emesis.  Non-bleeding erosive gastropathy. No specimens collected.  Normal examined duodenum.  No active source of GI bleeding identified on this exam.  Preparation of the colon was inadequate for screening purposes.  Old red blood was found in the entire colon. This was cleared with fair visualization with no active bleeding source identified on this examination.  There was old red blood in the terminal ileum, but this was cleared with underlying bilious fluid. Suspect likely refluxed blood into TI after enema administration, lower suspicion for small bowel bleed though this is not definitively ruled out.  No specimens collected.    Recommendations:  -please discuss with family if PEG is within their goals of care, family surprised to see GI team and hear about PEG eval  -please reach back out to GI once this conversation is completed and family consensus re PEG  -trend vitals, CBC, and monitor for clinical signs of bleeding  -maintain active type and screen  -transfusion goal to maintain hemoglobin >/= 7.0 and platelets >/= 50  -avoid NSAIDs  -PPI IV BID for projected 8 week course followed by once daily  -If patient rebleeds, recommend STAT CTA to identify source with IR consultation if positive for evaluation of embolization      All recommendations preliminary until note signed by service attending.    Thank you for involving us in the care of this patient. Please contact should any concern or questions arise.    Shaw Britton MD   Gastroenterology/Hepatology Fellow PGY-5  Available on [employer] Teams 7am - 5pm  [email or web address]    NON-URGENT CONSULTS:  Please email:  [email or web address]   OR  [email or web address]    After 5pm, please contact the on-call GI fellow. [phone]    AT NIGHT AND ON WEEKENDS:  Contact on-call GI fellow via answering service ([phone]) from 5pm-8am and on weekends/holidays

## 2023-09-03 NOTE — CONSULT NOTE ADULT - ASSESSMENT
73 f with DM, HTN, CKD, breast CA, latent TB (treated first with INH then had rash and switched to rifampin), MSSA bacteremia, c-diff, respiratory arrest and cardiac arrest (2018), s/p trach/PEG then removed, CVA with residual weakness, aspiration PNA, 1/4 sputums had MAC 7/2023, admitted 8/11 with respiratory failure no fever or WBC but was intubated and then spiked  blood cx negative, sputum cx with MSSA  s/p vanco and aztreonam 8/11  s/p cefepime 8/12 and had ?rash  s/p cefazolin 8/13-8/14  head MRI 8/17 with acute cerebellar infarct  had renal failure, AIN? family declined renal biopsy started on prednisone  s/p trach 9/1 and BAL with MSSA   ET cx with ESBL and MSSA  started on ana cristina 9/1  blood cx 9/1: MSSA   Will manage through open access workque   73 f with DM, HTN, CKD, breast CA, latent TB (treated first with INH then had rash and switched to rifampin), MSSA bacteremia, c-diff, respiratory arrest and cardiac arrest (2018), s/p trach/PEG then removed, CVA with residual weakness, aspiration PNA, 1/4 sputums had MAC 7/2023, admitted 8/11 with respiratory failure no fever or WBC but was intubated and then spiked  blood cx negative, sputum cx with MSSA  s/p vanco and aztreonam 8/11  s/p cefepime 8/12 and had ?rash  s/p cefazolin 8/13-8/14  head MRI 8/17 with acute cerebellar infarct  had renal failure, AIN? family declined renal biopsy started on prednisone  s/p trach 9/1 and BAL with MSSA   ET cx with ESBL and MSSA  started on ana cristina 9/1  blood cx 9/1: MSSA  CXR with b/l opacities, R increased    initial  resp failure for ?fluid ovrerload but also had MSSA in the sputum, got vanco, aztreonam one day then cefepime and had rash, renal failure which was considered due to cefepime and then received 2 days of cefazolin and then off, now with trach and bronc on 9/1 with MSSA bacteremia and BAL also MSSA  another ET cx with MSSA and ESBL E-coli  hypotension, MSSA bacteremia likely due to pneumonia    * cannot do cefazolin so will have to treat with vanco 500 post HD, if there is no plan for regular HD then would do per level  * not sure if the ESBL E-coli is playing any role but would do a short course, c/w ana cristina for now  * repeat blood cx  * repeat TTE  * monitor CBC/diff and renal function    The above assessment and plan was discussed with the primary team    Yumiko Conner MD  contact on teams  After 5pm and on weekends call [phone]

## 2023-09-03 NOTE — PROGRESS NOTE ADULT - SUBJECTIVE AND OBJECTIVE BOX
Patient is a 73y old  Female who presents with a chief complaint of Respiratory distress (03 Sep 2023 17:40)      SUBJECTIVE / OVERNIGHT EVENTS: ptn transferred to [facility], awaiting PEG placement, family is deciding. HDS, HD planned for 9/4, on prednisone taper, on Meropenem post septic shock w malodorous resp secretions, Hyperglycemia    MEDICATIONS  (STANDING):  albuterol/ipratropium for Nebulization 3 milliLiter(s) Nebulizer every 8 hours  atorvastatin 40 milliGRAM(s) Oral at bedtime  chlorhexidine 0.12% Liquid 15 milliLiter(s) Oral Mucosa every 12 hours  chlorhexidine 2% Cloths 1 Application(s) Topical daily  dextrose 5%. 1000 milliLiter(s) (50 mL/Hr) IV Continuous <Continuous>  dextrose 5%. 1000 milliLiter(s) (100 mL/Hr) IV Continuous <Continuous>  dextrose 50% Injectable 12.5 Gram(s) IV Push once  dextrose 50% Injectable 25 Gram(s) IV Push once  dextrose 50% Injectable 25 Gram(s) IV Push once  doxazosin 6 milliGRAM(s) Oral <User Schedule>  glucagon  Injectable 1 milliGRAM(s) IntraMuscular once  insulin lispro (ADMELOG) corrective regimen sliding scale   SubCutaneous every 6 hours  insulin NPH human recombinant 1 Unit(s) SubCutaneous every 6 hours  meropenem  IVPB 500 milliGRAM(s) IV Intermittent every 12 hours  pantoprazole  Injectable 40 milliGRAM(s) IV Push two times a day  predniSONE   Tablet 30 milliGRAM(s) Oral daily  sevelamer carbonate Powder 1600 milliGRAM(s) Oral three times a day  tranexamic acid Injectable for Topical Use 10 milliLiter(s) Topical once    MEDICATIONS  (PRN):  acetaminophen   Oral Liquid .. 650 milliGRAM(s) Oral every 6 hours PRN Temp greater or equal to 38C (100.4F), Mild Pain (1 - 3)  dextrose Oral Gel 15 Gram(s) Oral once PRN Blood Glucose LESS THAN 70 milliGRAM(s)/deciliter      Vital Signs Last 24 Hrs  T(F): 99.4 (09-03-23 @ 22:05), Max: 101 (09-03-23 @ 17:36)  HR: 99 (09-03-23 @ 22:05) (83 - 103)  BP: 125/59 (09-03-23 @ 22:05) (98/47 - 138/62)  RR: 20 (09-03-23 @ 22:05) (12 - 22)  SpO2: 92% (09-03-23 @ 22:05) (92% - 99%)  Telemetry:   CAPILLARY BLOOD GLUCOSE      POCT Blood Glucose.: 383 mg/dL (03 Sep 2023 16:51)  POCT Blood Glucose.: 313 mg/dL (03 Sep 2023 11:09)  POCT Blood Glucose.: 263 mg/dL (03 Sep 2023 06:28)  POCT Blood Glucose.: 275 mg/dL (03 Sep 2023 00:43)    I&O's Summary    02 Sep 2023 07:01  -  03 Sep 2023 07:00  --------------------------------------------------------  IN: 1050 mL / OUT: 0 mL / NET: 1050 mL    03 Sep 2023 07:01  -  03 Sep 2023 23:09  --------------------------------------------------------  IN: 480 mL / OUT: 0 mL / NET: 480 mL        PHYSICAL EXAM:  GENERAL: NAD, well-developed  HEAD:  Atraumatic, Normocephalic  EYES: EOMI, PERRLA, conjunctiva and sclera clear  NECK: Supple, No JVD  CHEST/LUNG: Clear to auscultation bilaterally; No wheeze  HEART: Regular rate and rhythm; No murmurs, rubs, or gallops  ABDOMEN: Soft, Nontender, Nondistended; Bowel sounds present  EXTREMITIES:  2+ Peripheral Pulses, No clubbing, cyanosis, or edema  PSYCH: AAOx3  NEUROLOGY: non-focal  SKIN: No rashes or lesions    LABS:                        7.1    7.64  )-----------( 144      ( 03 Sep 2023 06:49 )             22.6     09-03    137  |  99  |  42<H>  ----------------------------<  233<H>  4.3   |  25  |  1.89<H>    Ca    8.4      03 Sep 2023 06:49  Phos  4.0     09-03  Mg     2.10     09-03    TPro  5.1<L>  /  Alb  2.0<L>  /  TBili  0.3  /  DBili  x   /  AST  11  /  ALT  11  /  AlkPhos  81  09-02    PT/INR - ( 02 Sep 2023 00:20 )   PT: 13.1 sec;   INR: 1.18 ratio         PTT - ( 02 Sep 2023 00:20 )  PTT:25.5 sec      Urinalysis Basic - ( 03 Sep 2023 06:49 )    Color: x / Appearance: x / SG: x / pH: x  Gluc: 233 mg/dL / Ketone: x  / Bili: x / Urobili: x   Blood: x / Protein: x / Nitrite: x   Leuk Esterase: x / RBC: x / WBC x   Sq Epi: x / Non Sq Epi: x / Bacteria: x        RADIOLOGY & ADDITIONAL TESTS:    Imaging Personally Reviewed:    Consultant(s) Notes Reviewed:      Care Discussed with Consultants/Other Providers:

## 2023-09-03 NOTE — PROGRESS NOTE ADULT - ATTENDING COMMENTS
Seen and examined. Please determine if PEG is within GOC, please d/w family why it is felt she will not be able to resume PO intake, they had many questions about why her condition is not felt to be reversible - she has had NGT for 3 weeks but could receive nutrition via NGT for several more weeks if the nare is switched, some patients with trach are able to eat by mouth, etc.    We are happy to assist in PEG placement if desired by patient and family. Please contact us if they would like to move forward.

## 2023-09-03 NOTE — CONSULT NOTE ADULT - SUBJECTIVE AND OBJECTIVE BOX
HPI:  73 f with DM, HTN, CKD, breast CA, latent TB (treated first with INH then had rash and switched to rifampin), MSSA bacteremia, c-diff, respiratory arrest and cardiac arrest (2018), s/p trach/PEG then removed, CVA with residual weakness, aspiration PNA, 1/4 sputums had MAC 7/2023, admitted 8/11 with respiratory failure no fever or WBC but was intubated and then spiked  blood cx negative, sputum cx with MSSA  s/p vanco and aztreonam 8/11  s/p cefepime 8/12 and had ?rash  s/p cefazolin 8/13-8/14  head MRI 8/17 with acute cerebellar infarct  had renal failure, AIN? family declined renal biopsy started on prednisone  s/p trach 9/1 and BAL with MSSA   ET cx with ESBL and MSSA  started on ana cristina 9/1  blood cx 9/1: MSSA      PAST MEDICAL & SURGICAL HISTORY:  Breast CA      Diabetes      Stroke      Cardiac arrest      HTN (hypertension)      H/O mastectomy, bilateral          Allergies    isoniazid (Rash)  nafcillin (Unknown)  hydrALAZINE (Rash)  vitamin E (Short breath; Urticaria; Hives)  doxycycline (Rash)  cefepime (Rash)  NIFEdipine (Urticaria; Hives)    Intolerances        ANTIMICROBIALS:  meropenem  IVPB 500 every 12 hours      OTHER MEDS:  acetaminophen   Oral Liquid .. 650 milliGRAM(s) Oral every 6 hours PRN  albuterol/ipratropium for Nebulization 3 milliLiter(s) Nebulizer every 8 hours  atorvastatin 40 milliGRAM(s) Oral at bedtime  chlorhexidine 0.12% Liquid 15 milliLiter(s) Oral Mucosa every 12 hours  chlorhexidine 2% Cloths 1 Application(s) Topical daily  dextrose 5%. 1000 milliLiter(s) IV Continuous <Continuous>  dextrose 5%. 1000 milliLiter(s) IV Continuous <Continuous>  dextrose 50% Injectable 12.5 Gram(s) IV Push once  dextrose 50% Injectable 25 Gram(s) IV Push once  dextrose 50% Injectable 25 Gram(s) IV Push once  dextrose Oral Gel 15 Gram(s) Oral once PRN  doxazosin 6 milliGRAM(s) Oral <User Schedule>  glucagon  Injectable 1 milliGRAM(s) IntraMuscular once  insulin lispro (ADMELOG) corrective regimen sliding scale   SubCutaneous every 6 hours  insulin NPH human recombinant 1 Unit(s) SubCutaneous every 6 hours  pantoprazole  Injectable 40 milliGRAM(s) IV Push two times a day  predniSONE   Tablet 30 milliGRAM(s) Oral daily  sevelamer carbonate Powder 1600 milliGRAM(s) Oral three times a day  tranexamic acid Injectable for Topical Use 10 milliLiter(s) Topical once      SOCIAL HISTORY:  , lives with family  no smoking, alcohol or drug abuse  no recent travel    FAMILY HISTORY:  No recent febrile illness in family members        ROS:  Unobtainable because:   All other systems negative     Constitutional: no fever, no chills, no weight loss, no night sweats  Eye: no eye pain, no redness, no vision changes  ENT:  no sore throat, no rhinorrhea  Cardiovascular:  no chest pain, no palpitation  Respiratory:  no SOB, no cough  GI:  no abd pain, no vomiting, no diarrhea  urinary: no dysuria, no hematuria, no flank pain  : no  discharge or bleeding  musculoskeletal:  no joint pain, no joint swelling  skin:  no rash  neurology:  no headache, no seizure, no change in mental status  psych: no anxiety, no depression     Physical Exam:    General:    NAD, non toxic  Head: atraumatic, normocephalic  Eyes: normal sclera and conjunctiva  ENT:   no oropharyngeal lesions, no LAD, neck supple  Cardio:    regular S1,S2, no murmur  Respiratory:   clear b/l, no wheezing  abd:   soft, BS +, not tender  :     no CVAT, no suprapubic tenderness, no willard  Musculoskeletal : no joint swelling, no edema  Skin:    no rash  vascular: no phlebitis  Neurologic:     no focal deficits  psych: normal affect      Drug Dosing Weight  Height (cm): 154.9 (11 Aug 2023 13:15)  Weight (kg): 66.5 (11 Aug 2023 13:15)  BMI (kg/m2): 27.7 (11 Aug 2023 13:15)  BSA (m2): 1.66 (11 Aug 2023 13:15)    Vital Signs Last 24 Hrs  T(F): 99 (09-03-23 @ 08:45), Max: 100.3 (08-30-23 @ 20:00)    Vital Signs Last 24 Hrs  HR: 103 (09-03-23 @ 10:53) (77 - 103)  BP: 135/45 (09-03-23 @ 08:45) (98/47 - 138/62)  RR: 14 (09-03-23 @ 08:45)  SpO2: 97% (09-03-23 @ 10:53) (93% - 100%)  Wt(kg): --                          7.1    7.64  )-----------( 144      ( 03 Sep 2023 06:49 )             22.6       09-03    137  |  99  |  42<H>  ----------------------------<  233<H>  4.3   |  25  |  1.89<H>    Ca    8.4      03 Sep 2023 06:49  Phos  4.0     09-03  Mg     2.10     09-03    TPro  5.1<L>  /  Alb  2.0<L>  /  TBili  0.3  /  DBili  x   /  AST  11  /  ALT  11  /  AlkPhos  81  09-02      Urinalysis Basic - ( 03 Sep 2023 06:49 )    Color: x / Appearance: x / SG: x / pH: x  Gluc: 233 mg/dL / Ketone: x  / Bili: x / Urobili: x   Blood: x / Protein: x / Nitrite: x   Leuk Esterase: x / RBC: x / WBC x   Sq Epi: x / Non Sq Epi: x / Bacteria: x        MICROBIOLOGY:  v  .Bronchial Bronchial Lavage  09-01-23   Numerous Staphylococcus aureus  Normal Respiratory Cate present  --    Numerous polymorphonuclear leukocytes per low power field  No squamous epithelial cells per low power field  Numerous Gram Positive Cocci in Clusters per oil power field  Few Gram Negative Rods per oil power field      .Blood Blood-Peripheral  09-01-23   Growth in aerobic bottle: Staphylococcus aureus  Direct identification is available within approximately 3-5  hours either by Blood Panel Multiplexed PCR or Direct  MALDI-TOF. Details: [email or web address]  --  Blood Culture PCR      ET Tube ET Tube  09-01-23   Moderate Staphylococcus aureus  Moderate Escherichia coli ESBL  Normal Respiratory Cate present  --  Staphylococcus aureus  Escherichia coli ESBL      .Blood Blood-Peripheral  08-16-23   No growth at 5 days  --  --      .Blood Blood-Peripheral  08-16-23   No growth at 5 days  --  --      .Sputum Sputum  08-16-23   Normal Respiratory Cate present  --    Few polymorphonuclear leukocytes per low power field  Rare Squamous epithelial cells per low power field  Few Gram Positive Cocci in Clusters per oil power field      .Sputum Sputum  08-11-23   Numerous Staphylococcus aureus  --  Staphylococcus aureus      Clean Catch Clean Catch (Midstream)  08-11-23   No growth  --  --      .Blood Blood-Peripheral  08-11-23   No growth at 5 days  --  --                  RADIOLOGY:    Images independently visualized and reviewed personally,  findings as below    < from: Xray Chest 1 View- PORTABLE-Urgent (Xray Chest 1 View- PORTABLE-Urgent .) (09.01.23 @ 13:28) >  IMPRESSION: Status post tracheostomyplacement with no complicating   pneumomediastinum or pneumothorax.    < end of copied text >  < from: MR Head No Cont (08.17.23 @ 19:58) >    MOTION LIMITED STUDY. ACUTE INFARCT INVOLVING THE RIGHT CEREBELLUM. THERE   IS NO EVIDENCE OF ACUTE INTRACRANIAL HEMORRHAGE.    < end of copied text >  < from: CT Chest No Cont (08.11.23 @ 13:19) >  IMPRESSION: Small to moderate size right pleural effusion has increased   in size when compared to previous exam.    3.2 cm cyst/cavity in the right upper lobe as well as bronchiectasis and   tree-in-bud opacities within both lungs as described above have not   significantly changed when compared to previous exam.    < end of copied text >   HPI:  73 f with DM, HTN, CKD, breast CA, latent TB (treated first with INH then had rash and switched to rifampin), MSSA bacteremia, c-diff, respiratory arrest and cardiac arrest (2018), s/p trach/PEG then removed, CVA with residual weakness, aspiration PNA, 1/4 sputums had MAC 7/2023, admitted 8/11 with respiratory failure no fever or WBC but was intubated and then spiked  blood cx negative, sputum cx with MSSA  s/p vanco and aztreonam 8/11  s/p cefepime 8/12 and had ?rash  s/p cefazolin 8/13-8/14  head MRI 8/17 with acute cerebellar infarct  had renal failure, AIN? family declined renal biopsy started on prednisone  s/p trach 9/1 and BAL with MSSA   ET cx with ESBL and MSSA  started on ana cristina 9/1  blood cx 9/1: MSSA      PAST MEDICAL & SURGICAL HISTORY:  Breast CA      Diabetes      Stroke      Cardiac arrest      HTN (hypertension)      H/O mastectomy, bilateral          Allergies    isoniazid (Rash)  nafcillin (Unknown)  hydrALAZINE (Rash)  vitamin E (Short breath; Urticaria; Hives)  doxycycline (Rash)  cefepime (Rash)  NIFEdipine (Urticaria; Hives)    Intolerances        ANTIMICROBIALS:  meropenem  IVPB 500 every 12 hours      OTHER MEDS:  acetaminophen   Oral Liquid .. 650 milliGRAM(s) Oral every 6 hours PRN  albuterol/ipratropium for Nebulization 3 milliLiter(s) Nebulizer every 8 hours  atorvastatin 40 milliGRAM(s) Oral at bedtime  chlorhexidine 0.12% Liquid 15 milliLiter(s) Oral Mucosa every 12 hours  chlorhexidine 2% Cloths 1 Application(s) Topical daily  dextrose 5%. 1000 milliLiter(s) IV Continuous <Continuous>  dextrose 5%. 1000 milliLiter(s) IV Continuous <Continuous>  dextrose 50% Injectable 12.5 Gram(s) IV Push once  dextrose 50% Injectable 25 Gram(s) IV Push once  dextrose 50% Injectable 25 Gram(s) IV Push once  dextrose Oral Gel 15 Gram(s) Oral once PRN  doxazosin 6 milliGRAM(s) Oral <User Schedule>  glucagon  Injectable 1 milliGRAM(s) IntraMuscular once  insulin lispro (ADMELOG) corrective regimen sliding scale   SubCutaneous every 6 hours  insulin NPH human recombinant 1 Unit(s) SubCutaneous every 6 hours  pantoprazole  Injectable 40 milliGRAM(s) IV Push two times a day  predniSONE   Tablet 30 milliGRAM(s) Oral daily  sevelamer carbonate Powder 1600 milliGRAM(s) Oral three times a day  tranexamic acid Injectable for Topical Use 10 milliLiter(s) Topical once      SOCIAL HISTORY:  , lives with family  no smoking, alcohol or drug abuse  no recent travel    FAMILY HISTORY:  No recent febrile illness in family members        ROS:  Unobtainable because: trached      Physical Exam:    General:    trached awake, NAD  Head: atraumatic, normocephalic  Eyes: normal sclera and conjunctiva  ENT:   trach  Cardio:    regular  Respiratory:   clear b/l, no wheezing  abd:   soft, BS +, not tender  :     no CVAT, no suprapubic tenderness, no willard  Musculoskeletal : no joint swelling, no edema  Skin:    no rash now  vascular: SHAKIRA odom  Neurologic:    awake  psych: normal affect      Drug Dosing Weight  Height (cm): 154.9 (11 Aug 2023 13:15)  Weight (kg): 66.5 (11 Aug 2023 13:15)  BMI (kg/m2): 27.7 (11 Aug 2023 13:15)  BSA (m2): 1.66 (11 Aug 2023 13:15)    Vital Signs Last 24 Hrs  T(F): 99 (09-03-23 @ 08:45), Max: 100.3 (08-30-23 @ 20:00)    Vital Signs Last 24 Hrs  HR: 103 (09-03-23 @ 10:53) (77 - 103)  BP: 135/45 (09-03-23 @ 08:45) (98/47 - 138/62)  RR: 14 (09-03-23 @ 08:45)  SpO2: 97% (09-03-23 @ 10:53) (93% - 100%)  Wt(kg): --                          7.1    7.64  )-----------( 144      ( 03 Sep 2023 06:49 )             22.6       09-03    137  |  99  |  42<H>  ----------------------------<  233<H>  4.3   |  25  |  1.89<H>    Ca    8.4      03 Sep 2023 06:49  Phos  4.0     09-03  Mg     2.10     09-03    TPro  5.1<L>  /  Alb  2.0<L>  /  TBili  0.3  /  DBili  x   /  AST  11  /  ALT  11  /  AlkPhos  81  09-02      Urinalysis Basic - ( 03 Sep 2023 06:49 )    Color: x / Appearance: x / SG: x / pH: x  Gluc: 233 mg/dL / Ketone: x  / Bili: x / Urobili: x   Blood: x / Protein: x / Nitrite: x   Leuk Esterase: x / RBC: x / WBC x   Sq Epi: x / Non Sq Epi: x / Bacteria: x        MICROBIOLOGY:  v  .Bronchial Bronchial Lavage  09-01-23   Numerous Staphylococcus aureus  Normal Respiratory Cate present  --    Numerous polymorphonuclear leukocytes per low power field  No squamous epithelial cells per low power field  Numerous Gram Positive Cocci in Clusters per oil power field  Few Gram Negative Rods per oil power field      .Blood Blood-Peripheral  09-01-23   Growth in aerobic bottle: Staphylococcus aureus  Direct identification is available within approximately 3-5  hours either by Blood Panel Multiplexed PCR or Direct  MALDI-TOF. Details: [email or web address]  --  Blood Culture PCR      ET Tube ET Tube  09-01-23   Moderate Staphylococcus aureus  Moderate Escherichia coli ESBL  Normal Respiratory Cate present  --  Staphylococcus aureus  Escherichia coli ESBL      .Blood Blood-Peripheral  08-16-23   No growth at 5 days  --  --      .Blood Blood-Peripheral  08-16-23   No growth at 5 days  --  --      .Sputum Sputum  08-16-23   Normal Respiratory Cate present  --    Few polymorphonuclear leukocytes per low power field  Rare Squamous epithelial cells per low power field  Few Gram Positive Cocci in Clusters per oil power field      .Sputum Sputum  08-11-23   Numerous Staphylococcus aureus  --  Staphylococcus aureus      Clean Catch Clean Catch (Midstream)  08-11-23   No growth  --  --      .Blood Blood-Peripheral  08-11-23   No growth at 5 days  --  --                  RADIOLOGY:    Images independently visualized and reviewed personally,  findings as below    < from: Xray Chest 1 View- PORTABLE-Urgent (Xray Chest 1 View- PORTABLE-Urgent .) (09.01.23 @ 13:28) >  IMPRESSION: Status post tracheostomyplacement with no complicating   pneumomediastinum or pneumothorax.    < end of copied text >  < from: MR Head No Cont (08.17.23 @ 19:58) >    MOTION LIMITED STUDY. ACUTE INFARCT INVOLVING THE RIGHT CEREBELLUM. THERE   IS NO EVIDENCE OF ACUTE INTRACRANIAL HEMORRHAGE.    < end of copied text >  < from: CT Chest No Cont (08.11.23 @ 13:19) >  IMPRESSION: Small to moderate size right pleural effusion has increased   in size when compared to previous exam.    3.2 cm cyst/cavity in the right upper lobe as well as bronchiectasis and   tree-in-bud opacities within both lungs as described above have not   significantly changed when compared to previous exam.    < end of copied text >

## 2023-09-03 NOTE — PROGRESS NOTE ADULT - ASSESSMENT
IMPRESSION: 73F w/ HTN, DM2, CVA, breast CA-bilateral mastectomy, recurrent aspiration pneumonia/respiratory failure, and CKD, 8/11/23 p/w acute hypercapnic respiratory failure; c/b RUSS    (1)CKD - stage 4     (2)RUSS - borderline oliguric AIN vs ATN -HD Friday 9/1    (3)A/I - ?AIN - on empiric Prednisone 40mg qd x past 2 weeks - we had planned to start tapering today    (4)Lytes - acceptable    (5)Pulm- hypercapnic respiratory failure on admission - now s/p trach; remains on vent    (6)CV - newly in shock as of last night/on pressors- now off pressors       RECOMMEND:  (1)plan for HD monday, off pressors   (2)No objection to starting to taper Prednisone  (3)Dose new meds for GFR ~15ml/min

## 2023-09-03 NOTE — PROGRESS NOTE ADULT - SUBJECTIVE AND OBJECTIVE BOX
Trach to vent      VITAL:  T(C): , Max: 37.8 (09-02-23 @ 22:30)  T(F): , Max: 100 (09-02-23 @ 22:30)  HR: 103 (09-03-23 @ 10:53)  BP: 135/45 (09-03-23 @ 08:45)  BP(mean): 57 (09-03-23 @ 02:57)  RR: 14 (09-03-23 @ 08:45)  SpO2: 97% (09-03-23 @ 10:53)  Wt(kg): --      PHYSICAL EXAM:  Constitutional: sedated on Precedex  HEENT: (+)NGT  Neck: (+)trach-vent  Respiratory: coarse BS b/l  Cardiovascular: ambika s1s2  Gastrointestinal: BS+, soft, NT/ND  Extremities: + peripheral edema  Neurological: tone WNL  : no Wheeler  Skin: No rashes  Access: [facility] temp HD catheter (8/19)    LABS:                        7.1    7.64  )-----------( 144      ( 03 Sep 2023 06:49 )             22.6     Na(137)/K(4.3)/Cl(99)/HCO3(25)/BUN(42)/Cr(1.89)Glu(233)/Ca(8.4)/Mg(2.10)/PO4(4.0)    09-03 @ 06:49  Na(138)/K(3.6)/Cl(101)/HCO3(28)/BUN(26)/Cr(1.19)Glu(154)/Ca(8.0)/Mg(1.80)/PO4(2.0)    09-02 @ 00:20  Na(134)/K(3.6)/Cl(98)/HCO3(25)/BUN(59)/Cr(2.14)Glu(260)/Ca(7.9)/Mg(1.60)/PO4(3.3)    09-01 @ 00:35    Urinalysis Basic - ( 03 Sep 2023 06:49 )    Color: x / Appearance: x / SG: x / pH: x  Gluc: 233 mg/dL / Ketone: x  / Bili: x / Urobili: x   Blood: x / Protein: x / Nitrite: x   Leuk Esterase: x / RBC: x / WBC x   Sq Epi: x / Non Sq Epi: x / Bacteria: x

## 2023-09-03 NOTE — PROGRESS NOTE ADULT - ASSESSMENT
74 y/o F DM on insulin, HTN, CKD, CHFpEF, breast CA, respiratory arrest and cardiac arrest (2018), CVA with residual weakness, aspiration PNA h/o trache, PEG, both removed presents with respiratory distress requiring intubation. May be volume overload i/s/o CHF/CKD vs decrease respiratory drive (given oxygen at home). Improving mental status however worsening renal function and anuria, concerning for ATN vs AIN. Course c/b an episode of coffee ground emesis and melena, for which GI was consulted and did EGD, colonoscopy, which found erosive gastropathy, and recommended to start on PPI BID. Also found to have new fevers likely VAP and MSSA bacteremia. Prolonged Vent time and trach'd. Transferred to [facility] on 9/3    Neuro   #AMS  #Metabolic encephalopathy   #CVA   Baseline MS AOx3 with short term memory changes per family  - MRI brain 8/17 showed right cerebellar infarct (new) with old left PCA/occipital infarcts, likely embolic in nature - STEPHANIE with no TRINITY thrombus or intracardiac shunts  - Now with mild sedation with Precedex for ventilator synchrony  - Intermittent shaking noted, per family happens at baseline but more pronounced here; EEG without epileptiform activity, per neuro no need for LP  - Restarted aspirin 8/26 - held 8/31 iso bleed   - Following simple commands on 9/3    Cardiac   Labile blood pressure - ranging between SBP 80s-200s on 8/31  - Labetalol 600 TID - overnight became hypotensive/bradycardic ? iso BB toxicity. Labetalol is renally cleared, consider restarting carvedilol if becomes hypertensive  - Clonidine increased to 0.3 TID - held as on pressors  - Home Cardura - held as on pressors  - Per discussion, order of adding BP meds: 1) cardura, 2) carvedilol, 3) clonidine    #CHFpEF   - TTE 7/2023 with EF 59%, with severe LVH and diastolic dysfunction   - pro-BNP > 86978, trop 78   - Repeat TTE with EF 60% normal systolic and grade 1 diastolic dysfunction     Pulmonary   #Acute hypercapnic and hypoxemic respiratory failure   - DDx: aspiration vs volume overload vs sedating medication decreasing drive (was given nyquil)   - s/p trach    /Renal   #RUSS on CKD  - Ddx: obstructive (willard in, no hydro on POCUS) vs low flow state vs AIN i/s/o cephalosporin use and drug rash   - kidney biopsy to r/o AIN planned 8/22, however family felt procedure too risky and so deferred  - cont empiric prednisone 40mg started 8/18   - Continue to monitor, dose meds per eGFR. avoid nephrotoxic agents  - No HD today  - Strict IOs per nephro; Willard removed with positive UA  - S/p prednisone 40 for two week course ending 9/1, will taper 10 per day per nephro, starting 9/3 will start 30 mg    # Hyperphosphatemia  - Continue to monitor BMP q12  - Started on renvela powder TID per nephro recs    GI  - Had one episode of coffee ground emesis 8/24, two episodes melena 8/31  - GI consulted, scope showed erosive gastropathy  - Continue PPI BID for 8 weeks  - Continue to monitor hemoglobin  - GI consulted again regarding PEG placement, follow up recs  - On orogastric tube feeds currently   - Monitor for bowel movements    Endocrine  #T2DM   - ISS  - Restarted NPH at 1, monitor BG and adjust to keep -180    ID   Started on empiric vancomycin and aztreonam (cefepime/zosyn allergy? developed rash req prednisone)   Trialed cefepime --> cefazolin (sputum MSSA), developed drug eruption - discontinued   UA +, Willard DC'd  Per ID pharmacist, started on meropenem  - ID following and recommended to continue Bradford and Vanc Post HD. Planned for Vanc 500mg tomorrow post HD    Heme/Onc  ppx: heparin q8 hrs - held as hemoglobin downtrending, asa held for the same  - Continue to monitor CBC  - SCDs ordered    Ethics  GOC - Per previous GOC with daughter and , reported that they have not talked about end of life care with the patient. For now, they wanted "everything to be done" including CPR[facility]

## 2023-09-03 NOTE — PROGRESS NOTE ADULT - SUBJECTIVE AND OBJECTIVE BOX
Interval Events:   -family at bedside, unaware of plan for PEG evaluation    ROS:   12 point review of systems performed and negative except otherwise noted in HPI.    Hospital Medications:  acetaminophen   Oral Liquid .. 650 milliGRAM(s) Oral every 6 hours PRN  albuterol/ipratropium for Nebulization 3 milliLiter(s) Nebulizer every 8 hours  atorvastatin 40 milliGRAM(s) Oral at bedtime  chlorhexidine 0.12% Liquid 15 milliLiter(s) Oral Mucosa every 12 hours  chlorhexidine 2% Cloths 1 Application(s) Topical daily  dextrose 5%. 1000 milliLiter(s) IV Continuous <Continuous>  dextrose 5%. 1000 milliLiter(s) IV Continuous <Continuous>  dextrose 50% Injectable 12.5 Gram(s) IV Push once  dextrose 50% Injectable 25 Gram(s) IV Push once  dextrose 50% Injectable 25 Gram(s) IV Push once  dextrose Oral Gel 15 Gram(s) Oral once PRN  doxazosin 6 milliGRAM(s) Oral <User Schedule>  glucagon  Injectable 1 milliGRAM(s) IntraMuscular once  insulin lispro (ADMELOG) corrective regimen sliding scale   SubCutaneous every 6 hours  insulin NPH human recombinant 1 Unit(s) SubCutaneous every 6 hours  meropenem  IVPB 500 milliGRAM(s) IV Intermittent every 12 hours  pantoprazole  Injectable 40 milliGRAM(s) IV Push two times a day  predniSONE   Tablet 30 milliGRAM(s) Oral daily  sevelamer carbonate Powder 1600 milliGRAM(s) Oral three times a day  tranexamic acid Injectable for Topical Use 10 milliLiter(s) Topical once      PHYSICAL EXAM:   Vital Signs:  Vital Signs Last 24 Hrs  T(C): 37.7 (03 Sep 2023 17:24), Max: 37.8 (02 Sep 2023 22:30)  T(F): 99.8 (03 Sep 2023 17:24), Max: 100 (02 Sep 2023 22:30)  HR: 96 (03 Sep 2023 17:24) (78 - 103)  BP: 133/66 (03 Sep 2023 17:24) (98/47 - 138/62)  BP(mean): 57 (03 Sep 2023 02:57) (57 - 81)  RR: 12 (03 Sep 2023 17:24) (12 - 25)  SpO2: 99% (03 Sep 2023 17:24) (94% - 100%)    Parameters below as of 03 Sep 2023 17:24  Patient On (Oxygen Delivery Method): ventilator, A/C    O2 Concentration (%): 30  Daily     Daily     GENERAL: raising arms, nonfocal, appears comfortable  NEURO: AOx0  HEENT: NCAT, no conjunctival pallor appreciated  CHEST: trach to vent, mechanical breath sounds  CARDIAC: regular rate, +S1/S2  ABDOMEN: soft, nontender, no rebound or guarding  EXTREMITIES: warm, well perfused  SKIN: no lesions noted    LABS: reviewed                        7.1    7.64  )-----------( 144      ( 03 Sep 2023 06:49 )             22.6     09-03    137  |  99  |  42<H>  ----------------------------<  233<H>  4.3   |  25  |  1.89<H>    Ca    8.4      03 Sep 2023 06:49  Phos  4.0     09-03  Mg     2.10     09-03    TPro  5.1<L>  /  Alb  2.0<L>  /  TBili  0.3  /  DBili  x   /  AST  11  /  ALT  11  /  AlkPhos  81  09-02    LIVER FUNCTIONS - ( 02 Sep 2023 00:20 )  Alb: 2.0 g/dL / Pro: 5.1 g/dL / ALK PHOS: 81 U/L / ALT: 11 U/L / AST: 11 U/L / GGT: x             Interval Diagnostic Studies: see sunrise for full report

## 2023-09-03 NOTE — PROGRESS NOTE ADULT - PA/NP COMMENTS
Advanced care planning was discussed with  family.  Advanced care planning forms were reviewed and discussed as appropriate.  Differential diagnosis and plan of care discussed with  after the evaluation.   Pain assessed and judicious use of narcotics when appropriate was discussed.  Importance of Fall prevention discussed.  Counseling on Smoking and Alcohol cessation was offered when appropriate.  Counseling on Diet, exercise, and medication compliance was done.

## 2023-09-03 NOTE — PROGRESS NOTE ADULT - SUBJECTIVE AND OBJECTIVE BOX
CHIEF COMPLAINT: Patient is a 73y old  Female who presents with a chief complaint of Respiratory distress.      Interval Events:      REVIEW OF SYSTEMS:  [ ] All other systems negative  [ ] Unable to assess ROS because ________      Mode: AC/ CMV (Assist Control/ Continuous Mandatory Ventilation), RR (machine): 12, TV (machine): 360, FiO2: 30, PEEP: 5, ITime: 0.84, MAP: 11, PIP: 39  Arterial Blood Gas:  09-02 @ 00:20  7.38/48/77/28/97.2/2.9  ABG lactate: --      OBJECTIVE:  ICU Vital Signs Last 24 Hrs  T(C): 37.2 (03 Sep 2023 08:45), Max: 37.8 (02 Sep 2023 22:30)  T(F): 99 (03 Sep 2023 08:45), Max: 100 (02 Sep 2023 22:30)  HR: 103 (03 Sep 2023 10:53) (77 - 103)  BP: 135/45 (03 Sep 2023 08:45) (98/47 - 138/62)  BP(mean): 57 (03 Sep 2023 02:57) (57 - 81)  ABP: 127/34 (02 Sep 2023 18:00) (124/37 - 141/38)  ABP(mean): 70 (02 Sep 2023 18:00) (66 - 78)  RR: 14 (03 Sep 2023 08:45) (12 - 25)  SpO2: 97% (03 Sep 2023 10:53) (93% - 100%)      O2 Parameters below as of 03 Sep 2023 08:45  Patient On (Oxygen Delivery Method): ventilator, A/C      O2 Concentration (%): 30      Mode: AC/ CMV (Assist Control/ Continuous Mandatory Ventilation), RR (machine): 12, TV (machine): 360, FiO2: 30, PEEP: 5, ITime: 0.84, MAP: 11, PIP: 39    09-02 @ 07:01  -  09-03 @ 07:00  --------------------------------------------------------  IN: 1050 mL / OUT: 0 mL / NET: 1050 mL      CAPILLARY BLOOD GLUCOSE      POCT Blood Glucose.: 263 mg/dL (03 Sep 2023 06:28)      HOSPITAL MEDICATIONS:  MEDICATIONS  (STANDING):  albuterol/ipratropium for Nebulization 3 milliLiter(s) Nebulizer every 8 hours  atorvastatin 40 milliGRAM(s) Oral at bedtime  chlorhexidine 0.12% Liquid 15 milliLiter(s) Oral Mucosa every 12 hours  chlorhexidine 2% Cloths 1 Application(s) Topical daily  dextrose 5%. 1000 milliLiter(s) (100 mL/Hr) IV Continuous <Continuous>  dextrose 5%. 1000 milliLiter(s) (50 mL/Hr) IV Continuous <Continuous>  dextrose 50% Injectable 12.5 Gram(s) IV Push once  dextrose 50% Injectable 25 Gram(s) IV Push once  dextrose 50% Injectable 25 Gram(s) IV Push once  doxazosin 6 milliGRAM(s) Oral <User Schedule>  glucagon  Injectable 1 milliGRAM(s) IntraMuscular once  insulin lispro (ADMELOG) corrective regimen sliding scale   SubCutaneous every 6 hours  insulin NPH human recombinant 1 Unit(s) SubCutaneous every 6 hours  meropenem  IVPB 500 milliGRAM(s) IV Intermittent every 12 hours  pantoprazole  Injectable 40 milliGRAM(s) IV Push two times a day  predniSONE   Tablet 30 milliGRAM(s) Oral daily  sevelamer carbonate Powder 1600 milliGRAM(s) Oral three times a day  tranexamic acid Injectable for Topical Use 10 milliLiter(s) Topical once    MEDICATIONS  (PRN):  acetaminophen   Oral Liquid .. 650 milliGRAM(s) Oral every 6 hours PRN Temp greater or equal to 38C (100.4F), Mild Pain (1 - 3)  dextrose Oral Gel 15 Gram(s) Oral once PRN Blood Glucose LESS THAN 70 milliGRAM(s)/deciliter      LABS:                        7.1    7.64  )-----------( 144      ( 03 Sep 2023 06:49 )             22.6     09-03    137  |  99  |  42<H>  ----------------------------<  233<H>  4.3   |  25  |  1.89<H>    Ca    8.4      03 Sep 2023 06:49  Phos  4.0     09-03  Mg     2.10     09-03    TPro  5.1<L>  /  Alb  2.0<L>  /  TBili  0.3  /  DBili  x   /  AST  11  /  ALT  11  /  AlkPhos  81  09-02    PT/INR - ( 02 Sep 2023 00:20 )   PT: 13.1 sec;   INR: 1.18 ratio         PTT - ( 02 Sep 2023 00:20 )  PTT:25.5 sec  Urinalysis Basic - ( 03 Sep 2023 06:49 )    Color: x / Appearance: x / SG: x / pH: x  Gluc: 233 mg/dL / Ketone: x  / Bili: x / Urobili: x   Blood: x / Protein: x / Nitrite: x   Leuk Esterase: x / RBC: x / WBC x   Sq Epi: x / Non Sq Epi: x / Bacteria: x      Arterial Blood Gas:  09-02 @ 00:20  7.38/48/77/28/97.2/2.9  ABG lactate: --        PAST MEDICAL & SURGICAL HISTORY:  Breast CA      Diabetes      Stroke      Cardiac arrest      HTN (hypertension)      H/O mastectomy, bilateral      FAMILY HISTORY:  No pertinent family history in first degree relatives      Social History:      RADIOLOGY:  [ ] Reviewed and interpreted by me      PULMONARY FUNCTION TESTS:      EKG: CHIEF COMPLAINT: Patient is a 73y old Female who presents with a chief complaint of Respiratory distress.      Interval Events: Transferred from MICU overnight.       REVIEW OF SYSTEMS:  [x] Unable to assess ROS because vented.      Mode: AC/ CMV (Assist Control/ Continuous Mandatory Ventilation), RR (machine): 12, TV (machine): 360, FiO2: 30, PEEP: 5, ITime: 0.84, MAP: 11, PIP: 39  Arterial Blood Gas:  09-02 @ 00:20  7.38/48/77/28/97.2/2.9  ABG lactate: --      OBJECTIVE:  ICU Vital Signs Last 24 Hrs  T(C): 37.2 (03 Sep 2023 08:45), Max: 37.8 (02 Sep 2023 22:30)  T(F): 99 (03 Sep 2023 08:45), Max: 100 (02 Sep 2023 22:30)  HR: 103 (03 Sep 2023 10:53) (77 - 103)  BP: 135/45 (03 Sep 2023 08:45) (98/47 - 138/62)  BP(mean): 57 (03 Sep 2023 02:57) (57 - 81)  ABP: 127/34 (02 Sep 2023 18:00) (124/37 - 141/38)  ABP(mean): 70 (02 Sep 2023 18:00) (66 - 78)  RR: 14 (03 Sep 2023 08:45) (12 - 25)  SpO2: 97% (03 Sep 2023 10:53) (93% - 100%)      O2 Parameters below as of 03 Sep 2023 08:45  Patient On (Oxygen Delivery Method): ventilator, A/C      O2 Concentration (%): 30      Mode: AC/ CMV (Assist Control/ Continuous Mandatory Ventilation), RR (machine): 12, TV (machine): 360, FiO2: 30, PEEP: 5, ITime: 0.84, MAP: 11, PIP: 39    09-02 @ 07:01  -  09-03 @ 07:00  --------------------------------------------------------  IN: 1050 mL / OUT: 0 mL / NET: 1050 mL      CAPILLARY BLOOD GLUCOSE      POCT Blood Glucose.: 263 mg/dL (03 Sep 2023 06:28)      HOSPITAL MEDICATIONS:  MEDICATIONS  (STANDING):  albuterol/ipratropium for Nebulization 3 milliLiter(s) Nebulizer every 8 hours  atorvastatin 40 milliGRAM(s) Oral at bedtime  chlorhexidine 0.12% Liquid 15 milliLiter(s) Oral Mucosa every 12 hours  chlorhexidine 2% Cloths 1 Application(s) Topical daily  dextrose 5%. 1000 milliLiter(s) (100 mL/Hr) IV Continuous <Continuous>  dextrose 5%. 1000 milliLiter(s) (50 mL/Hr) IV Continuous <Continuous>  dextrose 50% Injectable 12.5 Gram(s) IV Push once  dextrose 50% Injectable 25 Gram(s) IV Push once  dextrose 50% Injectable 25 Gram(s) IV Push once  doxazosin 6 milliGRAM(s) Oral <User Schedule>  glucagon  Injectable 1 milliGRAM(s) IntraMuscular once  insulin lispro (ADMELOG) corrective regimen sliding scale   SubCutaneous every 6 hours  insulin NPH human recombinant 1 Unit(s) SubCutaneous every 6 hours  meropenem  IVPB 500 milliGRAM(s) IV Intermittent every 12 hours  pantoprazole  Injectable 40 milliGRAM(s) IV Push two times a day  predniSONE   Tablet 30 milliGRAM(s) Oral daily  sevelamer carbonate Powder 1600 milliGRAM(s) Oral three times a day  tranexamic acid Injectable for Topical Use 10 milliLiter(s) Topical once    MEDICATIONS  (PRN):  acetaminophen   Oral Liquid .. 650 milliGRAM(s) Oral every 6 hours PRN Temp greater or equal to 38C (100.4F), Mild Pain (1 - 3)  dextrose Oral Gel 15 Gram(s) Oral once PRN Blood Glucose LESS THAN 70 milliGRAM(s)/deciliter      LABS:                        7.1    7.64  )-----------( 144      ( 03 Sep 2023 06:49 )             22.6     09-03    137  |  99  |  42<H>  ----------------------------<  233<H>  4.3   |  25  |  1.89<H>    Ca    8.4      03 Sep 2023 06:49  Phos  4.0     09-03  Mg     2.10     09-03    TPro  5.1<L>  /  Alb  2.0<L>  /  TBili  0.3  /  DBili  x   /  AST  11  /  ALT  11  /  AlkPhos  81  09-02    PT/INR - ( 02 Sep 2023 00:20 )   PT: 13.1 sec;   INR: 1.18 ratio         PTT - ( 02 Sep 2023 00:20 )  PTT:25.5 sec  Urinalysis Basic - ( 03 Sep 2023 06:49 )    Color: x / Appearance: x / SG: x / pH: x  Gluc: 233 mg/dL / Ketone: x  / Bili: x / Urobili: x   Blood: x / Protein: x / Nitrite: x   Leuk Esterase: x / RBC: x / WBC x   Sq Epi: x / Non Sq Epi: x / Bacteria: x      Arterial Blood Gas:  09-02 @ 00:20  7.38/48/77/28/97.2/2.9  ABG lactate: --        PAST MEDICAL & SURGICAL HISTORY:  Breast CA      Diabetes      Stroke      Cardiac arrest      HTN (hypertension)      H/O mastectomy, bilateral      FAMILY HISTORY:  No pertinent family history in first degree relatives      Social History:      RADIOLOGY:  [ ] Reviewed and interpreted by me      PULMONARY FUNCTION TESTS:      EKG:

## 2023-09-03 NOTE — PROGRESS NOTE ADULT - ASSESSMENT
74 y/o F well known to me from my [facility]. she was admitted at [facility] 7/12-7/22 w aspiration PNA, was treated w CEFEPIME, developed an allergic rash,  dCHF, + MAC on AFB culture, had been progressively getting more and more lethargic and dyspneic at home since DC.   In  am of 8/11/23  ptn presented with respiratory distress w hypoxia and hypercarbia requiring intubation 2/2 volume overload +/- Asp PNA      Neuro   responds appropriately   Baseline MS AOx3, aphasic   - h/o CVA , on aspirin and statin . 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 Mcnulty, now in [facility]  s/p septic shock overnight 9/1, now on meropenem, HDS  s/p trache, on vent support      Renal   Hyperkalemia with EKG changes  , initially treated w LOKELMA,  now resolved   Ptn well know to Dr. Colon, consult reviewed    HD 8/19,  8/21, 8/26 and 8/28.  s/p PUF 8/29 2.5 Liters, HD 8/30,  9/1  next HD 9/4  renal biopsy has been deferred , ASA  resumed, AC on hold    GI  NPO  on tube feeds  coffee ground emesis x 1 8/24, melena overnight 8/31, s/p 1UPRBC, EGD/Colonoscopy: erosive gastropathy, esophagisits, on colonoscopy old blood seen no active bleeding, poor prep  family to decide re PEG placement    Endocrine  T2DM   A1C 7.1 in 7/2023   - BG goal 140-200     ID   No fever/leukocytosis, recheck temp   Hx latent TB which was treated, no concern for TB   - grew MAC on AFB culture from most recent admission. would call ID consult  Started on empiric vancomycin and aztreonam,  changed to cefepime 8/12, cefepime dced on 8/13(cefepime/zosyn allergy.  developed rash when on cefepime on previous admission ) . started on AVALOX on 8/13, off ABx on 8/14. ptn has an allergic rash, prob 2/2 cefepime  on Meropenem since 9/1 post septic shock w malodorous resp secretions  - f/u MRSA   - all cxs NTD    Heme/Onc  ppx: heparin q8 hrs     Ethics  GOC - Discussed GOC with daughter and , they have opted in the past for full code. and she remains full code at present

## 2023-09-03 NOTE — DISCHARGE NOTE PROVIDER - HOSPITAL COURSE
72 y/o F DM on insulin, HTN, CKD, CHFpEF, breast CA, respiratory arrest and cardiac arrest (2018), CVA with residual weakness, aspiration PNA h/o trache, PEG, both removed presents with respiratory distress, trialed on BIPAP but requiring intubation. May be volume overload i/s/o CHF/CKD vs decrease respiratory drive (given oxygen at home). Initally started on vanc, aztreonam and Flagyl, then transitioned to cefepime, however had drug eruption so was discontinued. She had difficulty weaning off sedation, and so CT was done and was negative. Had worsening RUSS, so nephro was consulted for possible AIN and started on steroids for 2 week course ending 9/1. Despite steroids, still appeared overloaded and oliguric for which dialysis was initiated. Nephro recommended renal biopsy however family felt risk outweighed benefit and deferred.     She also had labile blood pressures for which she was titrated up to 600 labetalol TID, clonidine 0.3 TID and cardura 6 mg BID. She then became hypotensive, briefly requiring pressors. She became febrile, and was started on meropenem for positive UA and bacteremia given her prior allergic reactions.     Her course was complicated by an episode of coffee ground emesis and melena, for which GI was consulted and did EGD, colonoscopy, which found erosive gastropathy, and recommended to start on PPI BID for projected 8 week course followed by once daily. She required multiple transfusions.     She had multiple SBTs, during which she was noted to not pull adequate tidal volumes and so she was unable to be extubated. Percutaneous tracheostomy was placed on 9/1. Transferred to RCU on 9/3

## 2023-09-03 NOTE — PROGRESS NOTE ADULT - NS ATTEND AMEND GEN_ALL_CORE FT
72 yo F w/ PMH of IDDM, CKD, HTN, HFpEF, CVA, cardiac arrest (2018) requiring trash and peg (decannulated previously) who initially presented to MICU w/ acute hypoxic and hypercapnia respiratory failure in the setting of RUSS 2/2 ATN vs AIN leading to pulmonary edema. She required intubation with failure to wean and is now sp tracheostomy. She has required intermittent HD. Family declined renal biopsy and she was started empirically on steroids for possible AIN. Renal function improving. Her hospital course was complicated by UGIB (coffee ground emesis and melena). She was found to have erosive gastropathy on EGD and is now on PPI BID. Her hospital course was further complicated by new acute cerebellar infarct and infections. She now has MSSA bacteremia, possibly secondary to MSSA pneumonia.      #ID  - MSSA in sputum  - MSSA bacteremia on 9/1  - ETCx w/ MSSA and ESBL E-coli   - Patient was intially tx w/ Vanc/azetreonam, cefepime - ?drug rash, had cefazolin 8/13-8/14.   - Now on meropenem IV for ESBL Ecoli in sputum   - tx MSSA bacteremia w/ VAnc after HD (or by level if not consistent)   - check TTE  - repeat BCx  - ID Consult appreciate     #Renal failure   - AIN vs ATN, on prednisone   - discuss steroid taper with nephrology   - was on Prednisone 40mg qd x past 2 weeks    - plan for HD Monday   - monitor electrolytes, urine output, bladder scan and straight cath if necessary     #hypoxic hypercapnia respiratory failure   - trash in place, can continue SBT for now     #GI   pending PEG tube placement if family in agreement   - will discuss GOC w/ daughter

## 2023-09-04 NOTE — PROGRESS NOTE ADULT - NS ATTEND AMEND GEN_ALL_CORE FT
74 yo F w/ PMH of IDDM, CKD, HTN, HFpEF, CVA, cardiac arrest (2018) requiring trash and peg (decannulated previously) who initially presented to MICU w/ acute hypoxic and hypercapnia respiratory failure in the setting of RUSS 2/2 ATN vs AIN leading to pulmonary edema. She required intubation with failure to wean and is now sp tracheostomy. She has required intermittent HD. Family declined renal biopsy and she was started empirically on steroids for possible AIN. Renal function improving. Her hospital course was complicated by UGIB (coffee ground emesis and melena). She was found to have erosive gastropathy on EGD and is now on PPI BID. Her hospital course was further complicated by new acute cerebellar infarct and infections. She now has MSSA bacteremia, possibly secondary to MSSA pneumonia.      #ID  - MSSA in sputum  - MSSA bacteremia on 9/1  - ETCx w/ MSSA and ESBL E-coli   - Patient was intially tx w/ Vanc/azetreonam, cefepime - ?drug rash, had cefazolin 8/13-8/14.   - Now on meropenem IV for ESBL Ecoli in sputum   - tx MSSA bacteremia w/ VAnc after HD (or by level if not consistent) - give dose of Vancomycin today   - check TTE  - Febrile overnight. repeat BCx  - ID Consult appreciated    #Renal failure   - AIN vs ATN, on prednisone   - discuss steroid taper with nephrology   - was on Prednisone 40mg qd x past 2 weeks    - plan for HD per renal. Anuric per nurse  - monitor electrolytes, urine output, bladder scan and straight cath if necessary     #hypoxic hypercapnia respiratory failure   - tracheostomy in place, can continue SBT for now     #GI   pending PEG tube placement if family in agreement   - will discuss GOC w/ daughter.  - will reconsult GI once bacteremia cleared/afebrile

## 2023-09-04 NOTE — PROGRESS NOTE ADULT - SUBJECTIVE AND OBJECTIVE BOX
CHIEF COMPLAINT: Patient is a 73y old  Female who presents with a chief complaint of Respiratory distress.      Interval Events:      REVIEW OF SYSTEMS:  [ ] All other systems negative  [ ] Unable to assess ROS because ________      Mode: AC/ CMV (Assist Control/ Continuous Mandatory Ventilation), RR (machine): 12, TV (machine): 360, FiO2: 30, PEEP: 5, MAP: 12, PIP: 40      OBJECTIVE:  ICU Vital Signs Last 24 Hrs  T(C): 37.7 (04 Sep 2023 04:43), Max: 38.3 (03 Sep 2023 17:36)  T(F): 99.8 (04 Sep 2023 04:43), Max: 101 (03 Sep 2023 17:36)  HR: 93 (04 Sep 2023 04:43) (93 - 103)  BP: 110/49 (04 Sep 2023 04:43) (110/49 - 135/45)  RR: 12 (04 Sep 2023 04:43) (12 - 20)  SpO2: 98% (04 Sep 2023 04:43) (92% - 100%)    O2 Parameters below as of 04 Sep 2023 04:43  Patient On (Oxygen Delivery Method): ventilator    O2 Concentration (%): 30      Mode: AC/ CMV (Assist Control/ Continuous Mandatory Ventilation), RR (machine): 12, TV (machine): 360, FiO2: 30, PEEP: 5, MAP: 12, PIP: 40    09-03 @ 07:01  -  09-04 @ 07:00  --------------------------------------------------------  IN: 960 mL / OUT: 0 mL / NET: 960 mL      CAPILLARY BLOOD GLUCOSE      POCT Blood Glucose.: 277 mg/dL (04 Sep 2023 04:56)      HOSPITAL MEDICATIONS:  MEDICATIONS  (STANDING):  albuterol/ipratropium for Nebulization 3 milliLiter(s) Nebulizer every 8 hours  atorvastatin 40 milliGRAM(s) Oral at bedtime  chlorhexidine 0.12% Liquid 15 milliLiter(s) Oral Mucosa every 12 hours  chlorhexidine 2% Cloths 1 Application(s) Topical daily  dextrose 5%. 1000 milliLiter(s) (50 mL/Hr) IV Continuous <Continuous>  dextrose 5%. 1000 milliLiter(s) (100 mL/Hr) IV Continuous <Continuous>  dextrose 50% Injectable 12.5 Gram(s) IV Push once  dextrose 50% Injectable 25 Gram(s) IV Push once  dextrose 50% Injectable 25 Gram(s) IV Push once  doxazosin 6 milliGRAM(s) Oral <User Schedule>  glucagon  Injectable 1 milliGRAM(s) IntraMuscular once  insulin lispro (ADMELOG) corrective regimen sliding scale   SubCutaneous every 6 hours  insulin NPH human recombinant 1 Unit(s) SubCutaneous every 6 hours  meropenem  IVPB 500 milliGRAM(s) IV Intermittent every 12 hours  pantoprazole  Injectable 40 milliGRAM(s) IV Push two times a day  predniSONE   Tablet 30 milliGRAM(s) Oral daily  sevelamer carbonate Powder 1600 milliGRAM(s) Oral three times a day  tranexamic acid Injectable for Topical Use 10 milliLiter(s) Topical once    MEDICATIONS  (PRN):  acetaminophen   Oral Liquid .. 650 milliGRAM(s) Oral every 6 hours PRN Temp greater or equal to 38C (100.4F), Mild Pain (1 - 3)  dextrose Oral Gel 15 Gram(s) Oral once PRN Blood Glucose LESS THAN 70 milliGRAM(s)/deciliter      LABS:                        7.1    7.64  )-----------( 144      ( 03 Sep 2023 06:49 )             22.6     09-03    137  |  99  |  42<H>  ----------------------------<  233<H>  4.3   |  25  |  1.89<H>    Ca    8.4      03 Sep 2023 06:49  Phos  4.0     09-03  Mg     2.10     09-03      Urinalysis Basic - ( 03 Sep 2023 06:49 )      Color: x / Appearance: x / SG: x / pH: x  Gluc: 233 mg/dL / Ketone: x  / Bili: x / Urobili: x   Blood: x / Protein: x / Nitrite: x   Leuk Esterase: x / RBC: x / WBC x   Sq Epi: x / Non Sq Epi: x / Bacteria: x      PAST MEDICAL & SURGICAL HISTORY:  Breast CA      Diabetes      Stroke      Cardiac arrest      HTN (hypertension)      H/O mastectomy, bilateral      FAMILY HISTORY:  No pertinent family history in first degree relatives        Social History:      RADIOLOGY:  [ ] Reviewed and interpreted by me    PULMONARY FUNCTION TESTS:    EKG: CHIEF COMPLAINT: Patient is a 73y old Female who presents with a chief complaint of Respiratory distress.      Interval Events: Pt febrile to 101F yesterday evening.       REVIEW OF SYSTEMS:  [x] Unable to assess ROS because vented.      Mode: AC/ CMV (Assist Control/ Continuous Mandatory Ventilation), RR (machine): 12, TV (machine): 360, FiO2: 30, PEEP: 5, MAP: 12, PIP: 40      OBJECTIVE:  ICU Vital Signs Last 24 Hrs  T(C): 37.7 (04 Sep 2023 04:43), Max: 38.3 (03 Sep 2023 17:36)  T(F): 99.8 (04 Sep 2023 04:43), Max: 101 (03 Sep 2023 17:36)  HR: 93 (04 Sep 2023 04:43) (93 - 103)  BP: 110/49 (04 Sep 2023 04:43) (110/49 - 135/45)  RR: 12 (04 Sep 2023 04:43) (12 - 20)  SpO2: 98% (04 Sep 2023 04:43) (92% - 100%)    O2 Parameters below as of 04 Sep 2023 04:43  Patient On (Oxygen Delivery Method): ventilator    O2 Concentration (%): 30      Mode: AC/ CMV (Assist Control/ Continuous Mandatory Ventilation), RR (machine): 12, TV (machine): 360, FiO2: 30, PEEP: 5, MAP: 12, PIP: 40    09-03 @ 07:01  -  09-04 @ 07:00  --------------------------------------------------------  IN: 960 mL / OUT: 0 mL / NET: 960 mL      CAPILLARY BLOOD GLUCOSE      POCT Blood Glucose.: 277 mg/dL (04 Sep 2023 04:56)      HOSPITAL MEDICATIONS:  MEDICATIONS  (STANDING):  albuterol/ipratropium for Nebulization 3 milliLiter(s) Nebulizer every 8 hours  atorvastatin 40 milliGRAM(s) Oral at bedtime  chlorhexidine 0.12% Liquid 15 milliLiter(s) Oral Mucosa every 12 hours  chlorhexidine 2% Cloths 1 Application(s) Topical daily  dextrose 5%. 1000 milliLiter(s) (50 mL/Hr) IV Continuous <Continuous>  dextrose 5%. 1000 milliLiter(s) (100 mL/Hr) IV Continuous <Continuous>  dextrose 50% Injectable 12.5 Gram(s) IV Push once  dextrose 50% Injectable 25 Gram(s) IV Push once  dextrose 50% Injectable 25 Gram(s) IV Push once  doxazosin 6 milliGRAM(s) Oral <User Schedule>  glucagon  Injectable 1 milliGRAM(s) IntraMuscular once  insulin lispro (ADMELOG) corrective regimen sliding scale   SubCutaneous every 6 hours  insulin NPH human recombinant 1 Unit(s) SubCutaneous every 6 hours  meropenem  IVPB 500 milliGRAM(s) IV Intermittent every 12 hours  pantoprazole  Injectable 40 milliGRAM(s) IV Push two times a day  predniSONE   Tablet 30 milliGRAM(s) Oral daily  sevelamer carbonate Powder 1600 milliGRAM(s) Oral three times a day  tranexamic acid Injectable for Topical Use 10 milliLiter(s) Topical once    MEDICATIONS  (PRN):  acetaminophen   Oral Liquid .. 650 milliGRAM(s) Oral every 6 hours PRN Temp greater or equal to 38C (100.4F), Mild Pain (1 - 3)  dextrose Oral Gel 15 Gram(s) Oral once PRN Blood Glucose LESS THAN 70 milliGRAM(s)/deciliter      LABS:                        7.1    7.64  )-----------( 144      ( 03 Sep 2023 06:49 )             22.6     09-03    137  |  99  |  42<H>  ----------------------------<  233<H>  4.3   |  25  |  1.89<H>    Ca    8.4      03 Sep 2023 06:49  Phos  4.0     09-03  Mg     2.10     09-03      Urinalysis Basic - ( 03 Sep 2023 06:49 )      Color: x / Appearance: x / SG: x / pH: x  Gluc: 233 mg/dL / Ketone: x  / Bili: x / Urobili: x   Blood: x / Protein: x / Nitrite: x   Leuk Esterase: x / RBC: x / WBC x   Sq Epi: x / Non Sq Epi: x / Bacteria: x      PAST MEDICAL & SURGICAL HISTORY:  Breast CA      Diabetes      Stroke      Cardiac arrest      HTN (hypertension)      H/O mastectomy, bilateral      FAMILY HISTORY:  No pertinent family history in first degree relatives      Social History:      RADIOLOGY:  [ ] Reviewed and interpreted by me    PULMONARY FUNCTION TESTS:    EKG:

## 2023-09-04 NOTE — PROGRESS NOTE ADULT - ASSESSMENT
74 y/o F well known to me [facility] [place] [facility]. [facility] was admitted at [facility] 7/12-7/22 w aspiration PNA, was treated w CEFEPIME, developed an allergic rash,  dCHF, + MAC on AFB culture, had been progressively getting more and more lethargic and dyspneic at home since DC.   In  am of 8/11/23  ptn presented with respiratory distress w hypoxia and hypercarbia requiring intubation 2/2 volume overload +/- Asp PNA      Neuro   responds appropriately   Baseline MS AOx3, aphasic   - h/o CVA , on aspirin and statin . 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 Mcnulty, now in RCU  s/p septic shock overnight 9/1, now on meropenem, HDS  s/p trache, on vent support      Renal   Hyperkalemia with EKG changes  , initially treated w LOKELMA,  now resolved   Ptn well know to Dr. Colon, consult reviewed    HD 8/19,  8/21, 8/26 and 8/28.  s/p PUF 8/29 2.5 Liters, HD 8/30,  9/1  next HD 9/4  renal biopsy has been deferred , ASA  resumed, AC on hold    GI  NPO  on tube feeds  coffee ground emesis x 1 8/24, melena overnight 8/31, s/p 1UPRBC, EGD/Colonoscopy: erosive gastropathy, esophagisits, on colonoscopy old blood seen no active bleeding, poor prep  family to decide re PEG placement    Endocrine  T2DM   A1C 7.1 in 7/2023   - BG goal 140-200     ID   No fever/leukocytosis, recheck temp   Hx latent TB which was treated, no concern for TB   - grew MAC on AFB culture from most recent admission. would call ID consult  Started on empiric vancomycin and aztreonam,  changed to cefepime 8/12, cefepime dced on 8/13(cefepime/zosyn allergy.  developed rash when on cefepime on previous admission ) . started on AVALOX on 8/13, off ABx on 8/14. ptn has an allergic rash, prob 2/2 cefepime  on Meropenem since 9/1 post septic shock w malodorous resp secretions  bacteremia w MSSA  - f/u MRSA   - all cxs NTD    Heme/Onc  ppx: heparin q8 hrs     Ethics  GOC - Discussed GOC with daughter and , they have opted in the past for full code. and she remains full code at present

## 2023-09-04 NOTE — PROGRESS NOTE ADULT - SUBJECTIVE AND OBJECTIVE BOX
Overnight events noted      VITAL:  T(C): , Max: 38.3 (09-03-23 @ 17:36)  T(F): , Max: 101 (09-03-23 @ 17:36)  HR: 89 (09-04-23 @ 10:51)  BP: 135/44 (09-04-23 @ 09:13)  BP(mean): --  RR: 12 (09-04-23 @ 09:13)  SpO2: 100% (09-04-23 @ 10:51)  Wt(kg): --      PHYSICAL EXAM:  Constitutional: sedated on Precedex  HEENT: (+)NGT  Neck: (+)trach-vent  Respiratory: coarse BS b/l  Cardiovascular: ambika s1s2  Gastrointestinal: BS+, soft, NT/ND  Extremities: + peripheral edema  Neurological: tone WNL  : no Wheeler  Skin: No rashes  Access: RI temp HD catheter (8/19)    LABS:                        7.1    7.64  )-----------( 144      ( 03 Sep 2023 06:49 )             22.6     Na(137)/K(4.3)/Cl(99)/HCO3(25)/BUN(42)/Cr(1.89)Glu(233)/Ca(8.4)/Mg(2.10)/PO4(4.0)    09-03 @ 06:49  Na(138)/K(3.6)/Cl(101)/HCO3(28)/BUN(26)/Cr(1.19)Glu(154)/Ca(8.0)/Mg(1.80)/PO4(2.0)    09-02 @ 00:20      IMPRESSION: 73F w/ HTN, DM2, CVA, breast CA-bilateral mastectomy, recurrent aspiration pneumonia/respiratory failure, and CKD, 8/11/23 p/w acute hypercapnic respiratory failure; c/b RUSS    (1)CKD - stage 4     (2)RUSS - ATN vs AIN - s/p empiric Prednisone course for potential AIN; now being weaned down. Last dialyzed 9/1/23. No urgent need for HD today. Whereas I assume further HD will be needed, the family would like every opportunity to be given to avoid HD if possible. I would like to try to hold off with HD until we absolutely require it from this point forward...and if she requires further HD, we will plan for long-term HD/placement of tunneled cath at that point.    (3)Pulm- hypercapnic respiratory failure on admission - now s/p trach; remains on vent    (4)CV -  normotensive    (5)ID - on IV Meropenem/Vanco      RECOMMEND:  (1)No HD today; will try to hold off from this point forward if possible; if to require HD later this week, I will d/w family regarding chronic long-term HD  (2)Prednisone taper down by 10mg/d  (3)Dose new meds for GFR <15ml/min              Jimmy Colon MD  [facility]  Office/on call physician: [phone]  Cell (7a-7p): [phone]       minimally communicative/sedated   at bedside      VITAL:  T(C): , Max: 38.3 (09-03-23 @ 17:36)  T(F): , Max: 101 (09-03-23 @ 17:36)  HR: 89 (09-04-23 @ 10:51)  BP: 135/44 (09-04-23 @ 09:13)  BP(mean): --  RR: 12 (09-04-23 @ 09:13)  SpO2: 100% (09-04-23 @ 10:51)  Wt(kg): --      PHYSICAL EXAM:  Constitutional: sedated on Precedex  HEENT: (+)NGT  Neck: (+)trach-vent  Respiratory: coarse BS b/l  Cardiovascular: ambika s1s2  Gastrointestinal: BS+, soft, NT/ND  Extremities: + peripheral edema  Neurological: tone WNL  : no Wheeler  Skin: No rashes  Access: [facility] HD catheter (8/19)    LABS:                        7.1    7.64  )-----------( 144      ( 03 Sep 2023 06:49 )             22.6     Na(137)/K(4.3)/Cl(99)/HCO3(25)/BUN(42)/Cr(1.89)Glu(233)/Ca(8.4)/Mg(2.10)/PO4(4.0)    09-03 @ 06:49  Na(138)/K(3.6)/Cl(101)/HCO3(28)/BUN(26)/Cr(1.19)Glu(154)/Ca(8.0)/Mg(1.80)/PO4(2.0)    09-02 @ 00:20      IMPRESSION: 73F w/ HTN, DM2, CVA, breast CA-bilateral mastectomy, recurrent aspiration pneumonia/respiratory failure, and CKD, 8/11/23 p/w acute hypercapnic respiratory failure; c/b RUSS    (1)CKD - stage 4     (2)RUSS - ATN vs AIN - s/p empiric Prednisone course for potential AIN; now being weaned down. Last dialyzed 9/1/23. No urgent need for HD today. Whereas I assume further HD will be needed, the family would like every opportunity to be given to avoid HD if possible. I would like to try to hold off with HD until we absolutely require it from this point forward...and if she requires further HD, we will plan for long-term HD/placement of tunneled cath at that point.    (3)Pulm- hypercapnic respiratory failure on admission - now s/p trach; remains on vent    (4)CV -  normotensive    (5)ID - on IV Meropenem/Vanco      RECOMMEND:  (1)No HD today; will try to hold off from this point forward if possible; if to require HD later this week, I will d/w family regarding chronic long-term HD  (2)Prednisone taper down by 10mg/d  (3)Dose new meds for GFR <15ml/min    counseled  at beside/answered all questions as able          Jimmy Colon MD  [facility]  Office/on call physician: [phone]  Cell (7a-7p): [phone]

## 2023-09-04 NOTE — PROGRESS NOTE ADULT - SUBJECTIVE AND OBJECTIVE BOX
Subjective: Patient seen and examined. No new events except as noted.   moved out of ICU       REVIEW OF SYSTEMS:  Unable to obtain       MEDICATIONS:  MEDICATIONS  (STANDING):  albuterol/ipratropium for Nebulization 3 milliLiter(s) Nebulizer every 8 hours  atorvastatin 40 milliGRAM(s) Oral at bedtime  chlorhexidine 0.12% Liquid 15 milliLiter(s) Oral Mucosa every 12 hours  chlorhexidine 2% Cloths 1 Application(s) Topical daily  dextrose 5%. 1000 milliLiter(s) (100 mL/Hr) IV Continuous <Continuous>  dextrose 5%. 1000 milliLiter(s) (50 mL/Hr) IV Continuous <Continuous>  dextrose 50% Injectable 12.5 Gram(s) IV Push once  dextrose 50% Injectable 25 Gram(s) IV Push once  dextrose 50% Injectable 25 Gram(s) IV Push once  doxazosin 6 milliGRAM(s) Oral <User Schedule>  glucagon  Injectable 1 milliGRAM(s) IntraMuscular once  insulin lispro (ADMELOG) corrective regimen sliding scale   SubCutaneous every 6 hours  insulin NPH human recombinant 1 Unit(s) SubCutaneous every 6 hours  meropenem  IVPB 500 milliGRAM(s) IV Intermittent every 12 hours  pantoprazole  Injectable 40 milliGRAM(s) IV Push two times a day  predniSONE   Tablet 30 milliGRAM(s) Oral daily  sevelamer carbonate Powder 1600 milliGRAM(s) Oral three times a day  tranexamic acid Injectable for Topical Use 10 milliLiter(s) Topical once      PHYSICAL EXAM:  T(C): 37.7 (09-04-23 @ 04:43), Max: 38.3 (09-03-23 @ 17:36)  HR: 92 (09-04-23 @ 07:33) (92 - 103)  BP: 110/49 (09-04-23 @ 04:43) (110/49 - 135/45)  RR: 12 (09-04-23 @ 04:43) (12 - 20)  SpO2: 96% (09-04-23 @ 07:33) (92% - 100%)  Wt(kg): --  I&O's Summary    03 Sep 2023 07:01  -  04 Sep 2023 07:00  --------------------------------------------------------  IN: 960 mL / OUT: 0 mL / NET: 960 mL            Appearance: NAD, +trach  HEENT: dry oral mucosa  Lymphatic: No lymphadenopathy  Cardiovascular: Normal S1 S2, No JVD, No murmurs, No edema  Respiratory: Decreased BS, + trach to vent	  Neuro: opens eyes  Gastrointestinal: Soft, Non-tender, + BS, + OGT	  Skin: No rashes, No ecchymoses, No cyanosis	  Extremities: No strength/ROM 2/2 sedation, + BL LE edema  Vascular: Peripheral pulses palpable 2+ bilaterally          LABS:    CARDIAC MARKERS:                                7.1    7.64  )-----------( 144      ( 03 Sep 2023 06:49 )             22.6     09-03    137  |  99  |  42<H>  ----------------------------<  233<H>  4.3   |  25  |  1.89<H>    Ca    8.4      03 Sep 2023 06:49  Phos  4.0     09-03  Mg     2.10     09-03      TELEMETRY: 	    ECG:  	  RADIOLOGY:   DIAGNOSTIC TESTING:  [ ] Echocardiogram:  [ ]  Catheterization:  [ ] Stress Test:    OTHER:

## 2023-09-04 NOTE — PROGRESS NOTE ADULT - SUBJECTIVE AND OBJECTIVE BOX
Patient is a 73y old  Female who presents with a chief complaint of Respiratory distress (04 Sep 2023 12:18)      SUBJECTIVE / OVERNIGHT EVENTS: trach to vent, kaofeeds via Left nostril,  refusing PEG placement plans on kaofeed x 4 weeks in each nostril    MEDICATIONS  (STANDING):  albuterol/ipratropium for Nebulization 3 milliLiter(s) Nebulizer every 6 hours  atorvastatin 40 milliGRAM(s) Oral at bedtime  chlorhexidine 0.12% Liquid 15 milliLiter(s) Oral Mucosa every 12 hours  chlorhexidine 2% Cloths 1 Application(s) Topical daily  dextrose 5%. 1000 milliLiter(s) (50 mL/Hr) IV Continuous <Continuous>  dextrose 5%. 1000 milliLiter(s) (100 mL/Hr) IV Continuous <Continuous>  dextrose 50% Injectable 12.5 Gram(s) IV Push once  dextrose 50% Injectable 25 Gram(s) IV Push once  dextrose 50% Injectable 25 Gram(s) IV Push once  doxazosin 6 milliGRAM(s) Oral <User Schedule>  glucagon  Injectable 1 milliGRAM(s) IntraMuscular once  insulin lispro (ADMELOG) corrective regimen sliding scale   SubCutaneous every 6 hours  insulin NPH human recombinant 1 Unit(s) SubCutaneous every 6 hours  meropenem  IVPB 500 milliGRAM(s) IV Intermittent every 12 hours  pantoprazole  Injectable 40 milliGRAM(s) IV Push two times a day  predniSONE   Tablet 30 milliGRAM(s) Oral daily  sevelamer carbonate Powder 1600 milliGRAM(s) Oral three times a day  sodium chloride 3%  Inhalation 4 milliLiter(s) Inhalation every 6 hours  tranexamic acid Injectable for Topical Use 10 milliLiter(s) Topical once    MEDICATIONS  (PRN):  acetaminophen   Oral Liquid .. 650 milliGRAM(s) Oral every 6 hours PRN Temp greater or equal to 38C (100.4F), Mild Pain (1 - 3)  dextrose Oral Gel 15 Gram(s) Oral once PRN Blood Glucose LESS THAN 70 milliGRAM(s)/deciliter      Vital Signs Last 24 Hrs  T(F): 99.3 (09-04-23 @ 12:05), Max: 101 (09-03-23 @ 17:36)  HR: 91 (09-04-23 @ 17:06) (83 - 99)  BP: 126/39 (09-04-23 @ 17:06) (110/49 - 140/46)  RR: 12 (09-04-23 @ 17:06) (12 - 20)  SpO2: 100% (09-04-23 @ 17:06) (92% - 100%)  Telemetry:   CAPILLARY BLOOD GLUCOSE      POCT Blood Glucose.: 259 mg/dL (04 Sep 2023 11:28)  POCT Blood Glucose.: 277 mg/dL (04 Sep 2023 04:56)  POCT Blood Glucose.: 331 mg/dL (04 Sep 2023 00:10)    I&O's Summary    03 Sep 2023 07:01  -  04 Sep 2023 07:00  --------------------------------------------------------  IN: 960 mL / OUT: 0 mL / NET: 960 mL        PHYSICAL EXAM:  GENERAL: NAD, well-developed  HEAD:  Atraumatic, Normocephalic  EYES: EOMI, PERRLA, conjunctiva and sclera clear  NECK: Supple, No JVD  CHEST/LUNG: Clear to auscultation bilaterally; No wheeze  HEART: Regular rate and rhythm; No murmurs, rubs, or gallops  ABDOMEN: Soft, Nontender, Nondistended; Bowel sounds present  EXTREMITIES:  2+ Peripheral Pulses, No clubbing, cyanosis, or edema  PSYCH: AAOx3  NEUROLOGY: non-focal  SKIN: No rashes or lesions    LABS:                        7.1    7.64  )-----------( 144      ( 03 Sep 2023 06:49 )             22.6     09-03    137  |  99  |  42<H>  ----------------------------<  233<H>  4.3   |  25  |  1.89<H>    Ca    8.4      03 Sep 2023 06:49  Phos  4.0     09-03  Mg     2.10     09-03            Urinalysis Basic - ( 03 Sep 2023 06:49 )    Color: x / Appearance: x / SG: x / pH: x  Gluc: 233 mg/dL / Ketone: x  / Bili: x / Urobili: x   Blood: x / Protein: x / Nitrite: x   Leuk Esterase: x / RBC: x / WBC x   Sq Epi: x / Non Sq Epi: x / Bacteria: x        RADIOLOGY & ADDITIONAL TESTS:    Imaging Personally Reviewed:    Consultant(s) Notes Reviewed:      Care Discussed with Consultants/Other Providers:

## 2023-09-04 NOTE — OCCUPATIONAL THERAPY INITIAL EVALUATION ADULT - PLANNED THERAPY INTERVENTIONS, OT EVAL
ADL retraining/balance training/bed mobility training/cognitive, visual perceptual/motor coordination training/neuromuscular re-education/parent/caregiver training.../ROM/strengthening

## 2023-09-04 NOTE — PROGRESS NOTE ADULT - ASSESSMENT
72 y/o F DM on insulin, HTN, CKD, CHFpEF, breast CA, respiratory arrest and cardiac arrest (2018), CVA with residual weakness, aspiration PNA h/o trache, PEG, both removed presents with respiratory distress requiring intubation. May be volume overload i/s/o CHF/CKD vs decrease respiratory drive (given oxygen at home). Improving mental status however worsening renal function and anuria, concerning for ATN vs AIN. Course c/b an episode of coffee ground emesis and melena, for which GI was consulted and did EGD, colonoscopy, which found erosive gastropathy, and recommended to start on PPI BID. Also found to have new fevers likely VAP and MSSA bacteremia. Prolonged Vent time and trach'd. Transferred to RCU on 9/3    Neuro   #AMS  #Metabolic encephalopathy   #CVA   Baseline MS AOx3 with short term memory changes per family  - MRI brain 8/17 showed right cerebellar infarct (new) with old left PCA/occipital infarcts, likely embolic in nature - STEPHANIE with no TRINITY thrombus or intracardiac shunts  - Now with mild sedation with Precedex for ventilator synchrony  - Intermittent shaking noted, per family happens at baseline but more pronounced here; EEG without epileptiform activity, per neuro no need for LP  - Restarted aspirin 8/26 - held 8/31 iso bleed   - Following simple commands on 9/3    Cardiac   Labile blood pressure - ranging between SBP 80s-200s on 8/31  - Labetalol 600 TID - overnight became hypotensive/bradycardic ? iso BB toxicity. Labetalol is renally cleared, consider restarting carvedilol if becomes hypertensive  - Clonidine increased to 0.3 TID - held as on pressors  - Home Cardura - held as on pressors  - Per discussion, order of adding BP meds: 1) cardura, 2) carvedilol, 3) clonidine    #CHFpEF   - TTE 7/2023 with EF 59%, with severe LVH and diastolic dysfunction   - pro-BNP > 30959, trop 78   - Repeat TTE with EF 60% normal systolic and grade 1 diastolic dysfunction     Pulmonary   #Acute hypercapnic and hypoxemic respiratory failure   - DDx: aspiration vs volume overload vs sedating medication decreasing drive (was given nyquil)   - s/p trach    /Renal   #RUSS on CKD  - Ddx: obstructive (willard in, no hydro on POCUS) vs low flow state vs AIN i/s/o cephalosporin use and drug rash   - kidney biopsy to r/o AIN planned 8/22, however family felt procedure too risky and so deferred  - cont empiric prednisone 40mg started 8/18   - Continue to monitor, dose meds per eGFR. avoid nephrotoxic agents  - No HD today  - Strict IOs per nephro; Willard removed with positive UA  - S/p prednisone 40 for two week course ending 9/1, will taper 10 per day per nephro, starting 9/3 will start 30 mg    # Hyperphosphatemia  - Continue to monitor BMP q12  - Started on renvela powder TID per nephro recs    GI  - Had one episode of coffee ground emesis 8/24, two episodes melena 8/31  - GI consulted, scope showed erosive gastropathy  - Continue PPI BID for 8 weeks  - Continue to monitor hemoglobin  - GI consulted again regarding PEG placement, follow up recs  - On orogastric tube feeds currently   - Monitor for bowel movements    Endocrine  #T2DM   - ISS  - Restarted NPH at 1, monitor BG and adjust to keep -180    ID   Started on empiric vancomycin and aztreonam (cefepime/zosyn allergy? developed rash req prednisone)   Trialed cefepime --> cefazolin (sputum MSSA), developed drug eruption - discontinued   UA +, Willard DC'd  Per ID pharmacist, started on meropenem  - ID following and recommended to continue Bradford and Vanc Post HD. Planned for Vanc 500mg tomorrow post HD    Heme/Onc  ppx: heparin q8 hrs - held as hemoglobin downtrending, asa held for the same  - Continue to monitor CBC  - SCDs ordered    Ethics  GOC - Per previous GOC with daughter and , reported that they have not talked about end of life care with the patient. For now, they wanted "everything to be done" including CPR[facility] pressors 72 y/o F DM on insulin, HTN, CKD, CHFpEF, breast CA, respiratory arrest and cardiac arrest (2018), CVA with residual weakness, aspiration PNA h/o trache, PEG, both removed presents with respiratory distress requiring intubation. May be volume overload i/s/o CHF/CKD vs decrease respiratory drive (given oxygen at home). Improving mental status however worsening renal function and anuria, concerning for ATN vs AIN. Course c/b an episode of coffee ground emesis and melena, for which GI was consulted and did EGD, colonoscopy, which found erosive gastropathy, and recommended to start on PPI BID. Also found to have new fevers likely VAP and MSSA bacteremia. Prolonged Vent time and trach'd. Transferred to RCU on 9/3    Neuro   #AMS  #Metabolic encephalopathy   #CVA   Baseline MS AOx3 with short term memory changes per family  - MRI brain 8/17 showed right cerebellar infarct (new) with old left PCA/occipital infarcts, likely embolic in nature - STEPHANIE with no TRINITY thrombus or intracardiac shunts  - Now with mild sedation with Precedex for ventilator synchrony  - Intermittent shaking noted, per family happens at baseline but more pronounced here; EEG without epileptiform activity, per neuro no need for LP  - Restarted aspirin 8/26 - held 8/31 iso bleed   - Following simple commands on 9/3; On 9/4 noted to be more lethargic    Cardiac   Labile blood pressure - ranging between SBP 80s-200s on 8/31  - Labetalol 600 TID - overnight became hypotensive/bradycardic ? iso BB toxicity. Labetalol is renally cleared, consider restarting carvedilol if becomes hypertensive  - Clonidine increased to 0.3 TID - held as on pressors  - Home Cardura - held as on pressors  - Per discussion, order of adding BP meds: 1) cardura, 2) carvedilol, 3) clonidine    #CHFpEF   - TTE 7/2023 with EF 59%, with severe LVH and diastolic dysfunction   - pro-BNP > 31538, trop 78   - Repeat TTE with EF 60% normal systolic and grade 1 diastolic dysfunction     Pulmonary   #Acute hypercapnic and hypoxemic respiratory failure   - DDx: aspiration vs volume overload vs sedating medication decreasing drive (was given nyquil)   - s/p trach  - Pt w/ copious thick secretions and started on airway clearance (IPV, NS3%, and duonebs)    /Renal   #RUSS on CKD  - Ddx: obstructive (willard in, no hydro on POCUS) vs low flow state vs AIN i/s/o cephalosporin use and drug rash   - kidney biopsy to r/o AIN planned 8/22, however family felt procedure too risky and so deferred  - cont empiric prednisone 40mg started 8/18   - Continue to monitor, dose meds per eGFR. avoid nephrotoxic agents  - No HD today  - Strict IOs per nephro; Willard removed with positive UA  - S/p prednisone 40 for two week course ending 9/1, will taper 10 per day per nephro, starting 9/3 will start 30 mg    # Hyperphosphatemia  - Continue to monitor BMP q12  - Started on renvela powder TID per nephro recs    GI  - Had one episode of coffee ground emesis 8/24, two episodes melena 8/31  - GI consulted, scope showed erosive gastropathy  - Continue PPI BID for 8 weeks  - Continue to monitor hemoglobin  - GI consulted again regarding PEG placement, follow up recs  - On orogastric tube feeds currently   - Monitor for bowel movements    Endocrine  #T2DM   - ISS  - Restarted NPH at 1, monitor BG and adjust to keep -180    ID   Started on empiric vancomycin and aztreonam (cefepime/zosyn allergy? developed rash req prednisone)   Trialed cefepime --> cefazolin (sputum MSSA), developed drug eruption - discontinued   UA +, Willard DC'd  Per ID pharmacist, started on meropenem  - ID following and recommended to continue Bradford and Vanc Post HD if on HD or by level if not on HD.  - Plan for Vanc by level today 9/4    Heme/Onc  ppx: heparin q8 hrs - held as hemoglobin downtrending, asa held for the same  - Continue to monitor CBC  - SCDs ordered    Ethics  GOC - Per previous GOC with daughter and , reported that they have not talked about end of life care with the patient. For now, they wanted "everything to be done" including CPR, continuing with mech ventilation/intubation, pressors

## 2023-09-04 NOTE — OCCUPATIONAL THERAPY INITIAL EVALUATION ADULT - PERTINENT HX OF CURRENT PROBLEM, REHAB EVAL
Patient is a 73 year old female admitted for respiratory distress requiring intubation, episode of coffee ground emesis and melena, for which GI was consulted and did EGD, colonoscopy, which found erosive gastropathy, requiring multiple transfusions. Patient trached and transferred to RCU on 9/3. 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

## 2023-09-05 NOTE — PROGRESS NOTE ADULT - SUBJECTIVE AND OBJECTIVE BOX
CHIEF COMPLAINT: Patient is a 73y old  Female who presents with a chief complaint of Respiratory distress (04 Sep 2023 17:30)    Interval Events:    REVIEW OF SYSTEMS:  [ ] All other systems negative  [ ] Unable to assess ROS because ________    Mode: AC/ CMV (Assist Control/ Continuous Mandatory Ventilation), RR (machine): 12, TV (machine): 360, FiO2: 40, PEEP: 5, ITime: 0.87, MAP: 11, PIP: 44    OBJECTIVE:  ICU Vital Signs Last 24 Hrs  T(C): 37.6 (05 Sep 2023 05:35), Max: 37.6 (05 Sep 2023 05:35)  T(F): 99.7 (05 Sep 2023 05:35), Max: 99.7 (05 Sep 2023 05:35)  HR: 92 (05 Sep 2023 07:24) (83 - 93)  BP: 108/36 (05 Sep 2023 05:35) (103/40 - 140/46)  BP(mean): --  ABP: --  ABP(mean): --  RR: 12 (05 Sep 2023 05:35) (12 - 12)  SpO2: 100% (05 Sep 2023 07:24) (99% - 100%)    O2 Parameters below as of 05 Sep 2023 05:35  Patient On (Oxygen Delivery Method): ventilator    O2 Concentration (%): 40    Mode: AC/ CMV (Assist Control/ Continuous Mandatory Ventilation), RR (machine): 12, TV (machine): 360, FiO2: 40, PEEP: 5, ITime: 0.87, MAP: 11, PIP: 44    09-04 @ 07:01  -  09-05 @ 07:00  --------------------------------------------------------  IN: 960 mL / OUT: 0 mL / NET: 960 mL    CAPILLARY BLOOD GLUCOSE    POCT Blood Glucose.: 260 mg/dL (05 Sep 2023 05:28)    HOSPITAL MEDICATIONS:  MEDICATIONS  (STANDING):  albuterol/ipratropium for Nebulization 3 milliLiter(s) Nebulizer every 6 hours  atorvastatin 40 milliGRAM(s) Oral at bedtime  chlorhexidine 0.12% Liquid 15 milliLiter(s) Oral Mucosa every 12 hours  chlorhexidine 2% Cloths 1 Application(s) Topical daily  dextrose 5%. 1000 milliLiter(s) (50 mL/Hr) IV Continuous <Continuous>  dextrose 5%. 1000 milliLiter(s) (100 mL/Hr) IV Continuous <Continuous>  dextrose 50% Injectable 12.5 Gram(s) IV Push once  dextrose 50% Injectable 25 Gram(s) IV Push once  dextrose 50% Injectable 25 Gram(s) IV Push once  doxazosin 6 milliGRAM(s) Oral <User Schedule>  glucagon  Injectable 1 milliGRAM(s) IntraMuscular once  insulin lispro (ADMELOG) corrective regimen sliding scale   SubCutaneous every 6 hours  insulin NPH human recombinant 1 Unit(s) SubCutaneous every 6 hours  meropenem  IVPB 500 milliGRAM(s) IV Intermittent every 12 hours  pantoprazole  Injectable 40 milliGRAM(s) IV Push two times a day  predniSONE   Tablet 30 milliGRAM(s) Oral daily  sevelamer carbonate Powder 1600 milliGRAM(s) Oral three times a day  sodium chloride 3%  Inhalation 4 milliLiter(s) Inhalation every 6 hours  tranexamic acid Injectable for Topical Use 10 milliLiter(s) Topical once    MEDICATIONS  (PRN):  acetaminophen   Oral Liquid .. 650 milliGRAM(s) Oral every 6 hours PRN Temp greater or equal to 38C (100.4F), Mild Pain (1 - 3)  dextrose Oral Gel 15 Gram(s) Oral once PRN Blood Glucose LESS THAN 70 milliGRAM(s)/deciliter      LABS:                        6.6    9.46  )-----------( 149      ( 05 Sep 2023 06:00 )             21.6     09-05    132<L>  |  97<L>  |  71<H>  ----------------------------<  244<H>  4.8   |  25  |  2.87<H>    Ca    8.2<L>      05 Sep 2023 06:00  Phos  4.8     09-05  Mg     2.20     09-05    TPro  5.5<L>  /  Alb  2.3<L>  /  TBili  <0.2  /  DBili  x   /  AST  18  /  ALT  14  /  AlkPhos  178<H>  09-05    Urinalysis Basic - ( 05 Sep 2023 06:00 )    Color: x / Appearance: x / SG: x / pH: x  Gluc: 244 mg/dL / Ketone: x  / Bili: x / Urobili: x   Blood: x / Protein: x / Nitrite: x   Leuk Esterase: x / RBC: x / WBC x   Sq Epi: x / Non Sq Epi: x / Bacteria: x    PAST MEDICAL & SURGICAL HISTORY:  Breast CA    Diabetes    Stroke    Cardiac arrest    HTN (hypertension)    H/O mastectomy, bilateral    FAMILY HISTORY:  No pertinent family history in first degree relatives    Social History:    RADIOLOGY:  [ ] Reviewed and interpreted by me    PULMONARY FUNCTION TESTS:    EKG: CHIEF COMPLAINT: Patient is a 73y old  Female who presents with a chief complaint of Respiratory distress (04 Sep 2023 17:30)    Interval Events: None reported overnight. Blood cx from 9/1 grew staph aureus, will c/w vanc by level. Vanc through level today 20.1                        +L ear purulent discharge noted - ENT consulted, recs appreciated.                         f/u with Dental consult    REVIEW OF SYSTEMS:  See above  [x] Unable to assess ROS because Pt is trached/vented     Mode: AC/ CMV (Assist Control/ Continuous Mandatory Ventilation), RR (machine): 12, TV (machine): 360, FiO2: 40, PEEP: 5, ITime: 0.87, MAP: 11, PIP: 44    OBJECTIVE:  ICU Vital Signs Last 24 Hrs  T(C): 37.6 (05 Sep 2023 05:35), Max: 37.6 (05 Sep 2023 05:35)  T(F): 99.7 (05 Sep 2023 05:35), Max: 99.7 (05 Sep 2023 05:35)  HR: 92 (05 Sep 2023 07:24) (83 - 93)  BP: 108/36 (05 Sep 2023 05:35) (103/40 - 140/46)  BP(mean): --  ABP: --  ABP(mean): --  RR: 12 (05 Sep 2023 05:35) (12 - 12)  SpO2: 100% (05 Sep 2023 07:24) (99% - 100%)    O2 Parameters below as of 05 Sep 2023 05:35  Patient On (Oxygen Delivery Method): ventilator    O2 Concentration (%): 40    Mode: AC/ CMV (Assist Control/ Continuous Mandatory Ventilation), RR (machine): 12, TV (machine): 360, FiO2: 40, PEEP: 5, ITime: 0.87, MAP: 11, PIP: 44    09-04 @ 07:01  -  09-05 @ 07:00  --------------------------------------------------------  IN: 960 mL / OUT: 0 mL / NET: 960 mL    CAPILLARY BLOOD GLUCOSE    POCT Blood Glucose.: 260 mg/dL (05 Sep 2023 05:28)    HOSPITAL MEDICATIONS:  MEDICATIONS  (STANDING):  albuterol/ipratropium for Nebulization 3 milliLiter(s) Nebulizer every 6 hours  atorvastatin 40 milliGRAM(s) Oral at bedtime  chlorhexidine 0.12% Liquid 15 milliLiter(s) Oral Mucosa every 12 hours  chlorhexidine 2% Cloths 1 Application(s) Topical daily  dextrose 5%. 1000 milliLiter(s) (50 mL/Hr) IV Continuous <Continuous>  dextrose 5%. 1000 milliLiter(s) (100 mL/Hr) IV Continuous <Continuous>  dextrose 50% Injectable 12.5 Gram(s) IV Push once  dextrose 50% Injectable 25 Gram(s) IV Push once  dextrose 50% Injectable 25 Gram(s) IV Push once  doxazosin 6 milliGRAM(s) Oral <User Schedule>  glucagon  Injectable 1 milliGRAM(s) IntraMuscular once  insulin lispro (ADMELOG) corrective regimen sliding scale   SubCutaneous every 6 hours  insulin NPH human recombinant 1 Unit(s) SubCutaneous every 6 hours  meropenem  IVPB 500 milliGRAM(s) IV Intermittent every 12 hours  pantoprazole  Injectable 40 milliGRAM(s) IV Push two times a day  predniSONE   Tablet 30 milliGRAM(s) Oral daily  sevelamer carbonate Powder 1600 milliGRAM(s) Oral three times a day  sodium chloride 3%  Inhalation 4 milliLiter(s) Inhalation every 6 hours  tranexamic acid Injectable for Topical Use 10 milliLiter(s) Topical once    MEDICATIONS  (PRN):  acetaminophen   Oral Liquid .. 650 milliGRAM(s) Oral every 6 hours PRN Temp greater or equal to 38C (100.4F), Mild Pain (1 - 3)  dextrose Oral Gel 15 Gram(s) Oral once PRN Blood Glucose LESS THAN 70 milliGRAM(s)/deciliter    PHYSICAL EXAM  GENERAL: Laying in bed comfortably, NAD  HEENT: +Trach, area c/d/i, +L ear purulent drainage noted  PULM: +Normal resp effort, + Rhonchi b/l, no w appreciated   HEART: +s1, s2  GI: +BS, soft, nt/nd, no peritoneal signs noted  MSK: No asymmetry, LIZZIE in upper extremities. RLE weakness- baseline. t  NEURO: Eyes opens spontaneously, follow simple commands    LABS:                        6.6    9.46  )-----------( 149      ( 05 Sep 2023 06:00 )             21.6     09-05    132<L>  |  97<L>  |  71<H>  ----------------------------<  244<H>  4.8   |  25  |  2.87<H>    Ca    8.2<L>      05 Sep 2023 06:00  Phos  4.8     09-05  Mg     2.20     09-05    TPro  5.5<L>  /  Alb  2.3<L>  /  TBili  <0.2  /  DBili  x   /  AST  18  /  ALT  14  /  AlkPhos  178<H>  09-05    Urinalysis Basic - ( 05 Sep 2023 06:00 )    Color: x / Appearance: x / SG: x / pH: x  Gluc: 244 mg/dL / Ketone: x  / Bili: x / Urobili: x   Blood: x / Protein: x / Nitrite: x   Leuk Esterase: x / RBC: x / WBC x   Sq Epi: x / Non Sq Epi: x / Bacteria: x    PAST MEDICAL & SURGICAL HISTORY:  Breast CA    Diabetes    Stroke    Cardiac arrest    HTN (hypertension)    H/O mastectomy, bilateral    FAMILY HISTORY:  No pertinent family history in first degree relatives    Social History:    RADIOLOGY:  [ ] Reviewed and interpreted by me    PULMONARY FUNCTION TESTS:    EKG:

## 2023-09-05 NOTE — PROGRESS NOTE ADULT - SUBJECTIVE AND OBJECTIVE BOX
Subjective: Patient seen and examined. No new events except as noted.   Drop in Hgb     REVIEW OF SYSTEMS:    Unable to obtain     MEDICATIONS:  MEDICATIONS  (STANDING):  albuterol/ipratropium for Nebulization 3 milliLiter(s) Nebulizer every 6 hours  atorvastatin 40 milliGRAM(s) Oral at bedtime  chlorhexidine 0.12% Liquid 15 milliLiter(s) Oral Mucosa every 12 hours  chlorhexidine 2% Cloths 1 Application(s) Topical daily  dextrose 5%. 1000 milliLiter(s) (50 mL/Hr) IV Continuous <Continuous>  dextrose 5%. 1000 milliLiter(s) (100 mL/Hr) IV Continuous <Continuous>  dextrose 50% Injectable 12.5 Gram(s) IV Push once  dextrose 50% Injectable 25 Gram(s) IV Push once  dextrose 50% Injectable 25 Gram(s) IV Push once  doxazosin 6 milliGRAM(s) Oral <User Schedule>  glucagon  Injectable 1 milliGRAM(s) IntraMuscular once  insulin lispro (ADMELOG) corrective regimen sliding scale   SubCutaneous every 6 hours  insulin NPH human recombinant 1 Unit(s) SubCutaneous every 6 hours  meropenem  IVPB 500 milliGRAM(s) IV Intermittent every 12 hours  pantoprazole  Injectable 40 milliGRAM(s) IV Push two times a day  predniSONE   Tablet 30 milliGRAM(s) Oral daily  sevelamer carbonate Powder 1600 milliGRAM(s) Oral three times a day  sodium chloride 3%  Inhalation 4 milliLiter(s) Inhalation every 6 hours  tranexamic acid Injectable for Topical Use 10 milliLiter(s) Topical once      PHYSICAL EXAM:  T(C): 37.6 (09-05-23 @ 05:35), Max: 37.6 (09-05-23 @ 05:35)  HR: 95 (09-05-23 @ 09:32) (83 - 95)  BP: 108/36 (09-05-23 @ 05:35) (103/40 - 140/46)  RR: 12 (09-05-23 @ 05:35) (12 - 12)  SpO2: 100% (09-05-23 @ 07:24) (99% - 100%)  Wt(kg): --  I&O's Summary    04 Sep 2023 07:01  -  05 Sep 2023 07:00  --------------------------------------------------------  IN: 960 mL / OUT: 0 mL / NET: 960 mL            Appearance: NAD, +trach  HEENT: dry oral mucosa  Lymphatic: No lymphadenopathy  Cardiovascular: Normal S1 S2, No JVD, No murmurs, No edema  Respiratory: Decreased BS, + trach to vent	  Neuro: opens eyes  Gastrointestinal: Soft, Non-tender, + BS, + OGT	  Skin: No rashes, No ecchymoses, No cyanosis	  Extremities: No strength/ROM 2/2 sedation, + BL LE edema  Vascular: Peripheral pulses palpable 2+ bilaterally    Mode: AC/ CMV (Assist Control/ Continuous Mandatory Ventilation), RR (machine): 12, TV (machine): 360, FiO2: 40, PEEP: 5, ITime: 0.87, MAP: 11, PIP: 44  LABS:    CARDIAC MARKERS:    aBlood Gas Profile - Arterial (09.02.23 @ 00:20)   pH, Arterial: 7.38  pCO2, Arterial: 48 mmHg  pO2, Arterial: 77 mmHg  HCO3, Arterial: 28 mmol/L  Base Excess, Arterial: 2.9 mmol/L  Oxygen Saturation, Arterial: 97.2 %  Total CO2, Arterial: 30 mmol/L  FIO2, Arterial: 30                            6.6    9.46  )-----------( 149      ( 05 Sep 2023 06:00 )             21.6     09-05    132<L>  |  97<L>  |  71<H>  ----------------------------<  244<H>  4.8   |  25  |  2.87<H>    Ca    8.2<L>      05 Sep 2023 06:00  Phos  4.8     09-05  Mg     2.20     09-05    TPro  5.5<L>  /  Alb  2.3<L>  /  TBili  <0.2  /  DBili  x   /  AST  18  /  ALT  14  /  AlkPhos  178<H>  09-05    proBNP:   Lipid Profile:   HgA1c:   TSH:             TELEMETRY: 	    ECG:  	  RADIOLOGY:   DIAGNOSTIC TESTING:  [ ] Echocardiogram:  [ ]  Catheterization:  [ ] Stress Test:    OTHER:

## 2023-09-05 NOTE — PROGRESS NOTE ADULT - ASSESSMENT
72 y/o F DM on insulin, HTN, CKD, CHFpEF, breast CA, respiratory arrest and cardiac arrest (2018), CVA with residual weakness, aspiration PNA h/o trache, PEG, both removed presents with respiratory distress requiring intubation. May be volume overload i/s/o CHF/CKD vs decrease respiratory drive (given oxygen at home). Improving mental status however worsening renal function and anuria, concerning for ATN vs AIN. Course c/b an episode of coffee ground emesis and melena, for which GI was consulted and did EGD, colonoscopy, which found erosive gastropathy, and recommended to start on PPI BID. Also found to have new fevers likely VAP and MSSA bacteremia. Prolonged Vent time and trach'd. Transferred to [facility] on 9/3    Neuro   #AMS  #Metabolic encephalopathy   #CVA   Baseline MS AOx3 with short term memory changes per family  - MRI brain 8/17 showed right cerebellar infarct (new) with old left PCA/occipital infarcts, likely embolic in nature - STEPHANIE with no TRINITY thrombus or intracardiac shunts  - Now with mild sedation with Precedex for ventilator synchrony  - Intermittent shaking noted, per family happens at baseline but more pronounced here; EEG without epileptiform activity, per neuro no need for LP  - Restarted aspirin 8/26 - held 8/31 iso bleed   - Following simple commands on 9/3; On 9/4 noted to be more lethargic    Cardiac   Labile blood pressure - ranging between SBP 80s-200s on 8/31  - Labetalol 600 TID - overnight became hypotensive/bradycardic ? iso BB toxicity. Labetalol is renally cleared, consider restarting carvedilol if becomes hypertensive  - Clonidine increased to 0.3 TID - held as on pressors  - Home Cardura - held as on pressors  - Per discussion, order of adding BP meds: 1) cardura, 2) carvedilol, 3) clonidine    #CHFpEF   - TTE 7/2023 with EF 59%, with severe LVH and diastolic dysfunction   - pro-BNP > 26929, trop 78   - Repeat TTE with EF 60% normal systolic and grade 1 diastolic dysfunction     Pulmonary   #Acute hypercapnic and hypoxemic respiratory failure   - DDx: aspiration vs volume overload vs sedating medication decreasing drive (was given nyquil)   - s/p trach  - Pt w/ copious thick secretions and started on airway clearance (IPV, NS3%, and duonebs)    /Renal   #RUSS on CKD  - Ddx: obstructive (willard in, no hydro on POCUS) vs low flow state vs AIN i/s/o cephalosporin use and drug rash   - kidney biopsy to r/o AIN planned 8/22, however family felt procedure too risky and so deferred  - cont empiric prednisone 40mg started 8/18   - Continue to monitor, dose meds per eGFR. avoid nephrotoxic agents  - No HD today  - Strict IOs per nephro; Willard removed with positive UA  - S/p prednisone 40 for two week course ending 9/1, will taper 10 per day per nephro, starting 9/3 will start 30 mg    # Hyperphosphatemia  - Continue to monitor BMP q12  - Started on renvela powder TID per nephro recs    GI  - Had one episode of coffee ground emesis 8/24, two episodes melena 8/31  - GI consulted, scope showed erosive gastropathy  - Continue PPI BID for 8 weeks  - Continue to monitor hemoglobin  - GI consulted again regarding PEG placement, follow up recs  - On orogastric tube feeds currently   - Monitor for bowel movements    Endocrine  #T2DM   - ISS  - Restarted NPH at 1, monitor BG and adjust to keep -180    ID   Started on empiric vancomycin and aztreonam (cefepime/zosyn allergy? developed rash req prednisone)   Trialed cefepime --> cefazolin (sputum MSSA), developed drug eruption - discontinued   UA +, Willard DC'd  Per ID pharmacist, started on meropenem  - ID following and recommended to continue Bradford and Vanc Post HD if on HD or by level if not on HD.  - Plan for Vanc by level today 9/4    Heme/Onc  ppx: heparin q8 hrs - held as hemoglobin downtrending, asa held for the same  - Continue to monitor CBC  - SCDs ordered    Ethics  GOC - Per previous GOC with daughter and , reported that they have not talked about end of life care with the patient. For now, they wanted "everything to be done" including CPR, continuing with mech ventilation/intubation, pressors   74 y/o F DM on insulin, HTN, CKD, CHFpEF, breast CA, respiratory arrest and cardiac arrest (2018), CVA with residual weakness, aspiration PNA h/o trache, PEG, both removed presents with respiratory distress requiring intubation. May be volume overload i/s/o CHF/CKD vs decrease respiratory drive (given oxygen at home). Improving mental status however worsening renal function and anuria, concerning for ATN vs AIN. Course c/b an episode of coffee ground emesis and melena, for which GI was consulted and did EGD, colonoscopy, which found erosive gastropathy, and recommended to start on PPI BID. Also found to have new fevers likely VAP and MSSA bacteremia. Prolonged Vent time and trach'd. 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     Cardiac   Labile blood pressure - ranging between SBP 80s-200s on 8/31  - Labetalol 600 TID - overnight became hypotensive/bradycardic ? iso BB toxicity. Labetalol is renally cleared, consider restarting carvedilol if becomes hypertensive  - Clonidine increased to 0.3 TID - held as on pressors  - Home Cardura - held as on pressors  - Per discussion, order of adding BP meds: 1) Cardura 2) carvedilol, 3) clonidine  - Portable TTE pending i/s/o  MSSA bacteremia     #CHFpEF   - TTE 7/2023 with EF 59%, with severe LVH and diastolic dysfunction   - pro-BNP > 42921, trop 78   - Repeat TTE with EF 60% normal systolic and grade 1 diastolic dysfunction     Pulmonary   #Acute hypercapnic and hypoxemic respiratory failure   - DDx: aspiration vs volume overload vs sedating medication decreasing drive (was given Nyquil)   - s/p trach  - Pt w/ copious thick secretions and c/w airway clearance (NS3%, and Duoneb)    /Renal   #RUSS on CKD  - Ddx: obstructive (willard in, no hydro on POCUS) vs low flow state vs AIN i/s/o cephalosporin use and drug rash   - kidney biopsy to r/o AIN planned 8/22, however family felt procedure too risky and so deferred  - cont empiric prednisone 40mg started 8/18   - Continue to monitor, dose meds per eGFR. avoid nephrotoxic agents  - No HD today  - Strict IOs per nephro; Willard removed with positive UA  - S/p prednisone 40 for two week course ending 9/1, will taper 10 per day per nephro  - c/w Prednisone taper (starting 9/3) - c/w 30 mg qd    # Hyperphosphatemia  - Continue to monitor BMP q12  - Started on Renvela powder TID per nephro recs    GI  - Had one episode of coffee ground emesis 8/24, two episodes melena 8/31  - GI consulted, scope showed erosive gastropathy  - Continue PPI BID for 8 weeks  - Continue to monitor hemoglobin  - GI consulted again regarding PEG placement, follow up recs  - On orogastric tube feeds currently   - Monitor for bowel movements    Endocrine  #T2DM   - ISS  - Increased NPH to 3units pre meals  - Will continue to monitor BG and adjust to keep -180    ID   Started on empiric vancomycin and aztreonam (cefepime/zosyn allergy? developed rash req prednisone)   Trialed cefepime --> cefazolin (sputum MSSA), developed drug eruption - discontinued   UA +, Willard DC'd  Per ID pharmacist, started on meropenem  - ID following and recommended to continue Bradford and Vanc Post HD if on HD or by level if not on HD.  - Vanc Random level on 9/5: 20.1  - L ear otitis externa- ENT following - recommend Ciprodex to wick BID for now  - f/u with Dental regarding concern for L lower mandibular area    Heme/Onc  ppx: heparin q8 hrs - held as hemoglobin downtrending, asa held for the same  - Continue to monitor CBC  - SCDs ordered    Ethics  GOC - Per previous GOC with daughter and , reported that they have not talked about end of life care with the patient. For now, they wanted "everything to be done" including CPR, continuing with mech ventilation/intubation, pressors

## 2023-09-05 NOTE — PROGRESS NOTE ADULT - SUBJECTIVE AND OBJECTIVE BOX
Patient is a 73y old  Female who presents with a chief complaint of Respiratory distress (05 Sep 2023 15:13)      SUBJECTIVE / OVERNIGHT EVENTS: trache to vent, on kaofeeds, MSSA Bacteremia, allergic to cephalosprins and PCN, on Vanco as per ID level 20, sputum also grew E.Coli-ESBL, on Bradford through 9/7( 1 week course as per ID) , no need for HD today, (+) Urine output, afebrile    MEDICATIONS  (STANDING):  albuterol/ipratropium for Nebulization 3 milliLiter(s) Nebulizer every 6 hours  atorvastatin 40 milliGRAM(s) Oral at bedtime  chlorhexidine 0.12% Liquid 15 milliLiter(s) Oral Mucosa every 12 hours  chlorhexidine 2% Cloths 1 Application(s) Topical daily  ciprofloxacin/hydrocortisone Suspension Otic 4 Drop(s) Left Ear two times a day  dextrose 5%. 1000 milliLiter(s) (50 mL/Hr) IV Continuous <Continuous>  dextrose 5%. 1000 milliLiter(s) (100 mL/Hr) IV Continuous <Continuous>  dextrose 50% Injectable 25 Gram(s) IV Push once  dextrose 50% Injectable 25 Gram(s) IV Push once  dextrose 50% Injectable 12.5 Gram(s) IV Push once  doxazosin 6 milliGRAM(s) Oral <User Schedule>  glucagon  Injectable 1 milliGRAM(s) IntraMuscular once  insulin lispro (ADMELOG) corrective regimen sliding scale   SubCutaneous every 6 hours  insulin NPH human recombinant 3 Unit(s) SubCutaneous every 6 hours  meropenem  IVPB 500 milliGRAM(s) IV Intermittent every 12 hours  pantoprazole  Injectable 40 milliGRAM(s) IV Push two times a day  predniSONE   Tablet 30 milliGRAM(s) Oral daily  sevelamer carbonate Powder 1600 milliGRAM(s) Oral three times a day  sodium chloride 3%  Inhalation 4 milliLiter(s) Inhalation every 6 hours    MEDICATIONS  (PRN):  acetaminophen   Oral Liquid .. 650 milliGRAM(s) Oral every 6 hours PRN Temp greater or equal to 38C (100.4F), Mild Pain (1 - 3)  dextrose Oral Gel 15 Gram(s) Oral once PRN Blood Glucose LESS THAN 70 milliGRAM(s)/deciliter      Vital Signs Last 24 Hrs  T(F): 98.7 (09-05-23 @ 18:39), Max: 99.7 (09-05-23 @ 05:35)  HR: 102 (09-05-23 @ 18:39) (85 - 104)  BP: 109/62 (09-05-23 @ 18:39) (103/40 - 120/36)  RR: 13 (09-05-23 @ 18:39) (12 - 13)  SpO2: 100% (09-05-23 @ 18:39) (99% - 100%)  Telemetry:   CAPILLARY BLOOD GLUCOSE      POCT Blood Glucose.: 253 mg/dL (05 Sep 2023 17:42)  POCT Blood Glucose.: 261 mg/dL (05 Sep 2023 11:27)  POCT Blood Glucose.: 260 mg/dL (05 Sep 2023 05:28)  POCT Blood Glucose.: 270 mg/dL (05 Sep 2023 00:08)    I&O's Summary    04 Sep 2023 07:01  -  05 Sep 2023 07:00  --------------------------------------------------------  IN: 960 mL / OUT: 0 mL / NET: 960 mL    05 Sep 2023 07:01  -  05 Sep 2023 19:32  --------------------------------------------------------  IN: 530 mL / OUT: 0 mL / NET: 530 mL        PHYSICAL EXAM:  GENERAL: NAD, well-developed  HEAD:  Atraumatic, Normocephalic  EYES: EOMI, PERRLA, conjunctiva and sclera clear  NECK: Supple, No JVD  CHEST/LUNG: Clear to auscultation bilaterally; No wheeze  HEART: Regular rate and rhythm; No murmurs, rubs, or gallops  ABDOMEN: Soft, Nontender, Nondistended; Bowel sounds present  EXTREMITIES:  2+ Peripheral Pulses, No clubbing, cyanosis, or edema  PSYCH: AAOx3  NEUROLOGY: non-focal  SKIN: No rashes or lesions    LABS:                        7.2    11.29 )-----------( 163      ( 05 Sep 2023 09:35 )             22.7     09-05    132<L>  |  97<L>  |  71<H>  ----------------------------<  244<H>  4.8   |  25  |  2.87<H>    Ca    8.2<L>      05 Sep 2023 06:00  Phos  4.8     09-05  Mg     2.20     09-05    TPro  5.5<L>  /  Alb  2.3<L>  /  TBili  <0.2  /  DBili  x   /  AST  18  /  ALT  14  /  AlkPhos  178<H>  09-05          Urinalysis Basic - ( 05 Sep 2023 06:00 )    Color: x / Appearance: x / SG: x / pH: x  Gluc: 244 mg/dL / Ketone: x  / Bili: x / Urobili: x   Blood: x / Protein: x / Nitrite: x   Leuk Esterase: x / RBC: x / WBC x   Sq Epi: x / Non Sq Epi: x / Bacteria: x        RADIOLOGY & ADDITIONAL TESTS:    Imaging Personally Reviewed:    Consultant(s) Notes Reviewed:      Care Discussed with Consultants/Other Providers:

## 2023-09-05 NOTE — CONSULT NOTE ADULT - SUBJECTIVE AND OBJECTIVE BOX
CC: ear pain and discharge    HPI: 73 year old female with a history of Dm, CVA, HTN admitted with respiratory distress and S/P tracheostomy yube now having left ear pain and discharge. Patient non verbal.       PAST MEDICAL & SURGICAL HISTORY:  Breast CA      Diabetes      Stroke      Cardiac arrest      HTN (hypertension)      H/O mastectomy, bilateral        Allergies    isoniazid (Rash)  nafcillin (Unknown)  hydrALAZINE (Rash)  vitamin E (Short breath; Urticaria; Hives)  doxycycline (Rash)  cefepime (Rash)  NIFEdipine (Urticaria; Hives)    Intolerances      MEDICATIONS  (STANDING):  albuterol/ipratropium for Nebulization 3 milliLiter(s) Nebulizer every 6 hours  atorvastatin 40 milliGRAM(s) Oral at bedtime  chlorhexidine 0.12% Liquid 15 milliLiter(s) Oral Mucosa every 12 hours  chlorhexidine 2% Cloths 1 Application(s) Topical daily  dextrose 5%. 1000 milliLiter(s) (50 mL/Hr) IV Continuous <Continuous>  dextrose 5%. 1000 milliLiter(s) (100 mL/Hr) IV Continuous <Continuous>  dextrose 50% Injectable 12.5 Gram(s) IV Push once  dextrose 50% Injectable 25 Gram(s) IV Push once  dextrose 50% Injectable 25 Gram(s) IV Push once  doxazosin 6 milliGRAM(s) Oral <User Schedule>  glucagon  Injectable 1 milliGRAM(s) IntraMuscular once  insulin lispro (ADMELOG) corrective regimen sliding scale   SubCutaneous every 6 hours  insulin NPH human recombinant 3 Unit(s) SubCutaneous every 6 hours  meropenem  IVPB 500 milliGRAM(s) IV Intermittent every 12 hours  pantoprazole  Injectable 40 milliGRAM(s) IV Push two times a day  predniSONE   Tablet 30 milliGRAM(s) Oral daily  sevelamer carbonate Powder 1600 milliGRAM(s) Oral three times a day  sodium chloride 3%  Inhalation 4 milliLiter(s) Inhalation every 6 hours    MEDICATIONS  (PRN):  acetaminophen   Oral Liquid .. 650 milliGRAM(s) Oral every 6 hours PRN Temp greater or equal to 38C (100.4F), Mild Pain (1 - 3)  dextrose Oral Gel 15 Gram(s) Oral once PRN Blood Glucose LESS THAN 70 milliGRAM(s)/deciliter      Social History: **??**    Family history: **??**    ROS:   ENT: all negative except as noted in HPI   CV: denies palpitations  Pulm: denies SOB, cough, hemoptysis  GI: denies change in apetite, indigestion, n/v  : denies pertinent urinary symptoms, urgency  Neuro: denies numbness/tingling, loss of sensation  Psych: denies anxiety  MS: denies muscle weakness, instability  Heme: denies easy bruising or bleeding  Endo: denies heat/cold intolerance, excessive sweating  Vascular: denies LE edema    Vital Signs Last 24 Hrs  T(C): 36.8 (05 Sep 2023 14:39), Max: 37.6 (05 Sep 2023 05:35)  T(F): 98.3 (05 Sep 2023 14:39), Max: 99.7 (05 Sep 2023 05:35)  HR: 104 (05 Sep 2023 14:43) (83 - 104)  BP: 119/33 (05 Sep 2023 14:39) (103/40 - 126/39)  BP(mean): 55 (05 Sep 2023 14:39) (55 - 55)  RR: 12 (05 Sep 2023 14:39) (12 - 12)  SpO2: 100% (05 Sep 2023 14:43) (99% - 100%)    Parameters below as of 05 Sep 2023 14:39  Patient On (Oxygen Delivery Method): ventilator    O2 Concentration (%): 30                          7.2    11.29 )-----------( 163      ( 05 Sep 2023 09:35 )             22.7    09-05    132<L>  |  97<L>  |  71<H>  ----------------------------<  244<H>  4.8   |  25  |  2.87<H>    Ca    8.2<L>      05 Sep 2023 06:00  Phos  4.8     09-05  Mg     2.20     09-05    TPro  5.5<L>  /  Alb  2.3<L>  /  TBili  <0.2  /  DBili  x   /  AST  18  /  ALT  14  /  AlkPhos  178<H>  09-05       PHYSICAL EXAM:  Gen: NAD  Skin: No rashes, bruises, or lesions  Head: Normocephalic, Atraumatic  Face: no edema, erythema, or fluctuance. Parotid glands soft without mass  Eyes: no scleral injection  Ears: Right - ear canal edematous with excoriations. unable to visualize TM. Wick palced.  No mastoid tenderness, erythema, or ear bulging            Left - ear canal clear, TM intact without effusion or erythema. No evidence of any fluid drainage. No mastoid tenderness, erythema, or ear bulging  Nose: Nares bilaterally patent, no discharge  Mouth: No Stridor / Drooling / Trismus.  Mucosa moist, tongue/uvula midline, oropharynx clear  Neck: Flat, supple, no lymphadenopathy, trachea midline, no masses  Lymphatic: No lymphadenopathy  Resp: breathing easily, no stridor  CV: no peripheral edema/cyanosis  GI: nondistended   Peripheral vascular: no JVD or edema  Neuro: facial nerve intact, no facial droop

## 2023-09-05 NOTE — PROGRESS NOTE ADULT - NS ATTEND AMEND GEN_ALL_CORE FT
agree with above  ENT and dental evaluations called agree with above  ENT and dental evaluations called  f/u blood cxs are pending  TTE

## 2023-09-05 NOTE — PROGRESS NOTE ADULT - ASSESSMENT
IMPRESSION: 73F w/ HTN, DM2, CVA, breast CA-bilateral mastectomy, recurrent aspiration pneumonia/respiratory failure, and CKD, 8/11/23 p/w acute hypercapnic respiratory failure; c/b RUSS    (1)CKD - stage 4     (2)RUSS - ATN vs AIN - s/p empiric Prednisone course for potential AIN; now being weaned down. Last dialyzed 9/1/23. No urgent need for HD today. Whereas I assume further HD will be needed, the family would like every opportunity to be given to avoid HD if possible. I would like to try to hold off with HD until we absolutely require it from this point forward...and if she requires further HD, we will plan for long-term HD/placement of tunneled cath at that point.    (3)Pulm- hypercapnic respiratory failure on admission - now s/p trach; remains on vent    (4)CV - normotensive    (5)ID - on IV Meropenem/Vanco    RECOMMEND:  (1)No HD today; will try to hold off from this point forward if possible; if to require HD later this week, will d/w family regarding chronic long-term HD  (2)Prednisone taper down by 10mg/d  (3)Dose new meds for GFR <15ml/min    D/w Dr. Isaiah Espinoza, [facility]  [phone]      IMPRESSION: 73F w/ HTN, DM2, CVA, breast CA-bilateral mastectomy, recurrent aspiration pneumonia/respiratory failure, and CKD, 8/11/23 p/w acute hypercapnic respiratory failure; c/b RUSS    (1)CKD - stage 4     (2)RUSS - ATN vs AIN - s/p empiric Prednisone course for potential AIN; now being weaned down. Last dialyzed 9/1/23. No urgent need for HD today. Whereas I assume further HD will be needed, the family would like every opportunity to be given to avoid HD if possible. I would like to try to hold off with HD until we absolutely require it from this point forward...and if she requires further HD, we will plan for long-term HD/placement of tunneled cath at that point.    (3)Pulm- hypercapnic respiratory failure on admission - now s/p trach; remains on vent    (4)CV - normotensive    (5)ID - on IV Meropenem/Vanco    RECOMMEND:  (1)No HD today; will try to hold off from this point forward if possible; if to require HD later this week, will d/w family regarding chronic long-term HD  (2)Prednisone taper down by 10mg/d  (3)Dose new meds for GFR <15ml/min    D/w Dr. Isaiah Espinoza, [facility]  [facility]  [phone]       RENAL ATTENDING NOTE  Patient seen and examined with NP. Agree with assessment and plan as above.  Family at bedside  (+)urine output today  No need for HD today      Jimmy Colon MD  [facility]  [phone]

## 2023-09-05 NOTE — CONSULT NOTE ADULT - NS ATTEND AMEND GEN_ALL_CORE FT
1. Wick placed. apply ciprodex drops to wick 4 drops bid  2. Trend CNC  3. Consider ID consult. patient Diabetic with OE  4-Consider CT temporal bone     Agree with above assessment and plan  Thank you for your referral  Nathan Ortega MD

## 2023-09-05 NOTE — PROGRESS NOTE ADULT - SUBJECTIVE AND OBJECTIVE BOX
NEPHROLOGY     Patient seen and examined with  at bedside, pt trach to vent, appears in no acute distress.     MEDICATIONS  (STANDING):  albuterol/ipratropium for Nebulization 3 milliLiter(s) Nebulizer every 6 hours  atorvastatin 40 milliGRAM(s) Oral at bedtime  chlorhexidine 0.12% Liquid 15 milliLiter(s) Oral Mucosa every 12 hours  chlorhexidine 2% Cloths 1 Application(s) Topical daily  dextrose 5%. 1000 milliLiter(s) (100 mL/Hr) IV Continuous <Continuous>  dextrose 5%. 1000 milliLiter(s) (50 mL/Hr) IV Continuous <Continuous>  dextrose 50% Injectable 25 Gram(s) IV Push once  dextrose 50% Injectable 25 Gram(s) IV Push once  dextrose 50% Injectable 12.5 Gram(s) IV Push once  doxazosin 6 milliGRAM(s) Oral <User Schedule>  glucagon  Injectable 1 milliGRAM(s) IntraMuscular once  insulin lispro (ADMELOG) corrective regimen sliding scale   SubCutaneous every 6 hours  insulin NPH human recombinant 1 Unit(s) SubCutaneous every 6 hours  meropenem  IVPB 500 milliGRAM(s) IV Intermittent every 12 hours  pantoprazole  Injectable 40 milliGRAM(s) IV Push two times a day  predniSONE   Tablet 30 milliGRAM(s) Oral daily  sevelamer carbonate Powder 1600 milliGRAM(s) Oral three times a day  sodium chloride 3%  Inhalation 4 milliLiter(s) Inhalation every 6 hours  tranexamic acid Injectable for Topical Use 10 milliLiter(s) Topical once    VITALS:  T(C): , Max: 37.6 (09-05-23 @ 05:35)  T(F): , Max: 99.7 (09-05-23 @ 05:35)  HR: 96 (09-05-23 @ 10:51)  BP: 108/36 (09-05-23 @ 05:35)  RR: 12 (09-05-23 @ 05:35)  SpO2: 100% (09-05-23 @ 10:51)    I and O's:    09-04 @ 07:01  -  09-05 @ 07:00  --------------------------------------------------------  IN: 960 mL / OUT: 0 mL / NET: 960 mL    PHYSICAL EXAM:  Constitutional: lethargic, nad  HEENT: (+)NGT  Neck: (+)trach-vent  Respiratory: coarse BS b/l  Cardiovascular: reg s1s2  Gastrointestinal: BS+, soft, NT/ND  Extremities: + peripheral edema  Neurological: tone WNL  : no Wheeler  Skin: No rashes  Access: RIJ temp HD catheter (8/19)    LABS:                        7.2    11.29 )-----------( 163      ( 05 Sep 2023 09:35 )             22.7     09-05    132<L>  |  97<L>  |  71<H>  ----------------------------<  244<H>  4.8   |  25  |  2.87<H>    Ca    8.2<L>      05 Sep 2023 06:00  Phos  4.8     09-05  Mg     2.20     09-05    TPro  5.5<L>  /  Alb  2.3<L>  /  TBili  <0.2  /  DBili  x   /  AST  18  /  ALT  14  /  AlkPhos  178<H>  09-05    Urine Studies:  Urinalysis Basic - ( 05 Sep 2023 06:00 )    Color: x / Appearance: x / SG: x / pH: x  Gluc: 244 mg/dL / Ketone: x  / Bili: x / Urobili: x   Blood: x / Protein: x / Nitrite: x   Leuk Esterase: x / RBC: x / WBC x   Sq Epi: x / Non Sq Epi: x / Bacteria:

## 2023-09-05 NOTE — PROGRESS NOTE ADULT - SUBJECTIVE AND OBJECTIVE BOX
Follow Up:  MSSA bacteremia    Interval History: pt afebrile, no HD for now, awaiting blood cx    ROS:    Unobtainable because: trached      Allergies  isoniazid (Rash)  nafcillin (Unknown)  hydrALAZINE (Rash)  vitamin E (Short breath; Urticaria; Hives)  doxycycline (Rash)  cefepime (Rash)  NIFEdipine (Urticaria; Hives)        ANTIMICROBIALS:  meropenem  IVPB 500 every 12 hours      OTHER MEDS:  acetaminophen   Oral Liquid .. 650 milliGRAM(s) Oral every 6 hours PRN  albuterol/ipratropium for Nebulization 3 milliLiter(s) Nebulizer every 6 hours  atorvastatin 40 milliGRAM(s) Oral at bedtime  chlorhexidine 0.12% Liquid 15 milliLiter(s) Oral Mucosa every 12 hours  chlorhexidine 2% Cloths 1 Application(s) Topical daily  dextrose 5%. 1000 milliLiter(s) IV Continuous <Continuous>  dextrose 5%. 1000 milliLiter(s) IV Continuous <Continuous>  dextrose 50% Injectable 12.5 Gram(s) IV Push once  dextrose 50% Injectable 25 Gram(s) IV Push once  dextrose 50% Injectable 25 Gram(s) IV Push once  dextrose Oral Gel 15 Gram(s) Oral once PRN  doxazosin 6 milliGRAM(s) Oral <User Schedule>  glucagon  Injectable 1 milliGRAM(s) IntraMuscular once  insulin lispro (ADMELOG) corrective regimen sliding scale   SubCutaneous every 6 hours  insulin NPH human recombinant 3 Unit(s) SubCutaneous every 6 hours  pantoprazole  Injectable 40 milliGRAM(s) IV Push two times a day  predniSONE   Tablet 30 milliGRAM(s) Oral daily  sevelamer carbonate Powder 1600 milliGRAM(s) Oral three times a day  sodium chloride 3%  Inhalation 4 milliLiter(s) Inhalation every 6 hours      Vital Signs Last 24 Hrs  T(C): 36.8 (05 Sep 2023 14:39), Max: 37.6 (05 Sep 2023 05:35)  T(F): 98.3 (05 Sep 2023 14:39), Max: 99.7 (05 Sep 2023 05:35)  HR: 104 (05 Sep 2023 14:43) (83 - 104)  BP: 119/33 (05 Sep 2023 14:39) (103/40 - 126/39)  BP(mean): 55 (05 Sep 2023 14:39) (55 - 55)  RR: 12 (05 Sep 2023 14:39) (12 - 12)  SpO2: 100% (05 Sep 2023 14:43) (99% - 100%)    Parameters below as of 05 Sep 2023 14:39  Patient On (Oxygen Delivery Method): ventilator    O2 Concentration (%): 30    Physical Exam:  General:    trached NAD  Respiratory:   trach on vent  abd:   soft, not tender  :     no CVAT, no suprapubic tenderness, no willard  Musculoskeletal : no joint swelling  Skin:    no rash now  vascular: [facility]abi                          7.2    11.29 )-----------( 163      ( 05 Sep 2023 09:35 )             22.7       09-05    132<L>  |  97<L>  |  71<H>  ----------------------------<  244<H>  4.8   |  25  |  2.87<H>    Ca    8.2<L>      05 Sep 2023 06:00  Phos  4.8     09-05  Mg     2.20     09-05    TPro  5.5<L>  /  Alb  2.3<L>  /  TBili  <0.2  /  DBili  x   /  AST  18  /  ALT  14  /  AlkPhos  178<H>  09-05      Urinalysis Basic - ( 05 Sep 2023 06:00 )    Color: x / Appearance: x / SG: x / pH: x  Gluc: 244 mg/dL / Ketone: x  / Bili: x / Urobili: x   Blood: x / Protein: x / Nitrite: x   Leuk Esterase: x / RBC: x / WBC x   Sq Epi: x / Non Sq Epi: x / Bacteria: x        MICROBIOLOGY:  Vancomycin Level, Random: 20.1 ug/mL (09-05-23 @ 06:00)  Vancomycin Level, Random: 9.6 ug/mL (09-04-23 @ 17:35)  v  .Bronchial Bronchial Lavage  09-01-23   Numerous Staphylococcus aureus Multiple Morphological Strains  Normal Respiratory Cate present  --  Staphylococcus aureus  Staphylococcus aureus      .Blood Blood-Peripheral  09-01-23   Growth in aerobic and anaerobic bottles: Staphylococcus aureus  Direct identification is available within approximately 3-5  hours either by Blood Panel Multiplexed PCR or Direct  MALDI-TOF. Details: [email or web address]  Growth in anaerobic bottle: blood culture bottle turned positive after  the final report was released.  --  Blood Culture PCR  Staphylococcus aureus      ET Tube ET Tube  09-01-23   Moderate Staphylococcus aureus  Moderate Escherichia coli ESBL  Normal Respiratory Cate present  --  Staphylococcus aureus  Escherichia coli ESBL      .Blood Blood-Peripheral  08-16-23   No growth at 5 days  --  --      .Blood Blood-Peripheral  08-16-23   No growth at 5 days  --  --      .Sputum Sputum  08-16-23   Normal Respiratory Cate present  --    Few polymorphonuclear leukocytes per low power field  Rare Squamous epithelial cells per low power field  Few Gram Positive Cocci in Clusters per oil power field      .Sputum Sputum  08-11-23   Numerous Staphylococcus aureus  --  Staphylococcus aureus      Clean Catch Clean Catch (Midstream)  08-11-23   No growth  --  --      .Blood Blood-Peripheral  08-11-23   No growth at 5 days  --  --                RADIOLOGY:  Images independently visualized and reviewed personally, findings as below  < from: Xray Chest 1 View- PORTABLE-Urgent (Xray Chest 1 View- PORTABLE-Urgent .) (09.01.23 @ 13:28) >    IMPRESSION: Status post tracheostomyplacement with no complicating   pneumomediastinum or pneumothorax.      < end of copied text >  < from: MR Head No Cont (08.17.23 @ 19:58) >  IMPRESSION:    MOTION LIMITED STUDY. ACUTE INFARCT INVOLVING THE RIGHT CEREBELLUM. THERE   IS NO EVIDENCE OF ACUTE INTRACRANIAL HEMORRHAGE.    < end of copied text >

## 2023-09-05 NOTE — PROGRESS NOTE ADULT - ASSESSMENT
72 y/o F well known to me from my housecal outpt practice. she was admitted at [facility] 7/12-7/22 w aspiration PNA, was treated w CEFEPIME, developed an allergic rash,  dCHF, + MAC on AFB culture, had been progressively getting more and more lethargic and dyspneic at home since DC.   In  am of 8/11/23  ptn presented with respiratory distress w hypoxia and hypercarbia requiring intubation 2/2 volume overload +/- Asp PNA      Neuro   responds appropriately   Baseline MS AOx3, aphasic   - h/o CVA , on aspirin and statin . 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 Mcnulyt, now in [facility]CU  s/p septic shock overnight 9/1, now on meropenem, HDS  s/p trache, on vent support      Renal   Hyperkalemia with EKG changes  , initially treated w LOKELMA,  now resolved   Ptn well know to Dr. Colon, consult reviewed    HD 8/19,  8/21, 8/26 and 8/28.  s/p PUF 8/29 2.5 Liters, HD 8/30,  9/1, 9/4  no need for HD today, has UO  renal biopsy has been deferred , ASA  resumed, AC on hold    GI  NPO  on tube feeds  coffee ground emesis x 1 8/24, melena overnight 8/31, s/p 1UPRBC, EGD/Colonoscopy: erosive gastropathy, esophagisits, on colonoscopy old blood seen no active bleeding, poor prep  family to decide re PEG placement    Endocrine  T2DM   A1C 7.1 in 7/2023   - BG goal 140-200     ID   No fever/leukocytosis, recheck temp   Hx latent TB which was treated, no concern for TB   - grew MAC on AFB culture from most recent admission. at [facility]  -MSSA Bacteremia 9/1, allergic to cephalosprins and PCN, on Vanco as per ID, level 20,   - sputum also grew E.Coli-ESBL, on [place] through 9/7( 1 week course as per ID) , afebrile      Heme/Onc  ppx: heparin q8 hrs     Ethics  GOC - Discussed GOC with daughter and , they have opted in the past for full code. and she remains full code at present

## 2023-09-05 NOTE — PROGRESS NOTE ADULT - ASSESSMENT
73 f with DM, HTN, CKD, breast CA, latent TB (treated first with INH then had rash and switched to rifampin), MSSA bacteremia, c-diff, respiratory arrest and cardiac arrest (2018), s/p trach/PEG then removed, CVA with residual weakness, aspiration PNA, 1/4 sputums had MAC 7/2023, admitted 8/11 with respiratory failure no fever or WBC but was intubated and then spiked  blood cx negative, sputum cx with MSSA  s/p vanco and aztreonam 8/11  s/p cefepime 8/12 and had ?rash  s/p cefazolin 8/13-8/14  head MRI 8/17 with acute cerebellar infarct  had renal failure, AIN? family declined renal biopsy started on prednisone  s/p trach 9/1 and BAL with MSSA   ET cx with ESBL and MSSA  started on ana cristina 9/1  blood cx 9/1: MSSA  CXR with b/l opacities, R increased    initial  resp failure for ?fluid ovrerload but also had MSSA in the sputum, got vanco, aztreonam one day then cefepime and had rash, renal failure which was considered due to cefepime and then received 2 days of cefazolin and then off, now with trach and bronc on 9/1 with MSSA bacteremia and BAL also MSSA  another ET cx with MSSA and ESBL E-coli  hypotension, MSSA bacteremia likely due to pneumonia    * cannot do cefazolin so will have to treat with vanco  * if there is plan for regular HD then 500 post HD, but nephro is holding off so would do per level  * not sure if the ESBL E-coli is playing any role but would do a short course, c/w ana cristina for now, started 9/1, will complete a 7 day course  * f.u the repeat blood cx  * repeat TTE  * monitor CBC/diff and renal function    The above assessment and plan was discussed with the primary team    Yumiko Conner MD  contact on teams  After 5pm and on weekends call [phone]

## 2023-09-06 NOTE — PROGRESS NOTE ADULT - ATTENDING COMMENTS
Assessment/recommendations as noted. Continue to monitor serial hemoglobin/hematocrit. Continue current regimen of PPI therapy. As indicated,  wishes to discuss with his family whether they want to pursue PEG.

## 2023-09-06 NOTE — PROGRESS NOTE ADULT - NS ATTEND AMEND GEN_ALL_CORE FT
agree with above  afebrile  latest blood cx negative  cont ana cristina and vanco by level  ENT appreciated. cipro drops started, cx fluid, otitis externa  Renal f/ u appreciated, holding on HD. some urine outpt, labs ok  OOB to chair trial today  TTE   at bedside

## 2023-09-06 NOTE — PROGRESS NOTE ADULT - SUBJECTIVE AND OBJECTIVE BOX
Follow Up:  MSSA bacteremia    Interval History: pt afebrile, had pain at the tongue and oral path was called, also had tenderness on L ear, ENT cultured    ROS:    Unobtainable because: trached        Allergies  isoniazid (Rash)  nafcillin (Unknown)  hydrALAZINE (Rash)  vitamin E (Short breath; Urticaria; Hives)  doxycycline (Rash)  cefepime (Rash)  NIFEdipine (Urticaria; Hives)        ANTIMICROBIALS:  meropenem  IVPB 500 every 12 hours      OTHER MEDS:  acetaminophen   Oral Liquid .. 650 milliGRAM(s) Oral every 6 hours PRN  albuterol/ipratropium for Nebulization 3 milliLiter(s) Nebulizer every 6 hours  atorvastatin 40 milliGRAM(s) Oral at bedtime  chlorhexidine 0.12% Liquid 15 milliLiter(s) Oral Mucosa every 12 hours  chlorhexidine 2% Cloths 1 Application(s) Topical daily  ciprofloxacin/hydrocortisone Suspension Otic 4 Drop(s) Left Ear two times a day  dextrose 5%. 1000 milliLiter(s) IV Continuous <Continuous>  dextrose 5%. 1000 milliLiter(s) IV Continuous <Continuous>  dextrose 50% Injectable 12.5 Gram(s) IV Push once  dextrose 50% Injectable 25 Gram(s) IV Push once  dextrose 50% Injectable 25 Gram(s) IV Push once  dextrose Oral Gel 15 Gram(s) Oral once PRN  doxazosin 6 milliGRAM(s) Oral <User Schedule>  glucagon  Injectable 1 milliGRAM(s) IntraMuscular once  insulin lispro (ADMELOG) corrective regimen sliding scale   SubCutaneous every 6 hours  insulin NPH human recombinant 3 Unit(s) SubCutaneous every 6 hours  pantoprazole  Injectable 40 milliGRAM(s) IV Push two times a day  predniSONE   Tablet 30 milliGRAM(s) Oral daily  sevelamer carbonate Powder 1600 milliGRAM(s) Oral three times a day  sodium chloride 3%  Inhalation 4 milliLiter(s) Inhalation every 6 hours      Vital Signs Last 24 Hrs  T(C): 36.3 (06 Sep 2023 07:33), Max: 37.1 (05 Sep 2023 18:39)  T(F): 97.3 (06 Sep 2023 07:33), Max: 98.7 (05 Sep 2023 18:39)  HR: 85 (06 Sep 2023 15:24) (78 - 102)  BP: 111/38 (06 Sep 2023 07:33) (109/62 - 127/38)  BP(mean): 57 (06 Sep 2023 07:33) (57 - 93)  RR: 12 (06 Sep 2023 07:33) (12 - 13)  SpO2: 100% (06 Sep 2023 15:24) (99% - 100%)    Parameters below as of 06 Sep 2023 15:24  Patient On (Oxygen Delivery Method): ventilator        Physical Exam:  General:    trached NAD  Respiratory:   trach on vent  abd:   soft, not tender  :     no CVAT, no suprapubic tenderness, no willard  Musculoskeletal : no joint swelling  Skin:    no rash now  vascular: [facility]                        7.1    8.30  )-----------( 151      ( 06 Sep 2023 06:10 )             23.4       09-06    130<L>  |  94<L>  |  82<H>  ----------------------------<  181<H>  5.1   |  22  |  3.09<H>    Ca    8.3<L>      06 Sep 2023 06:10  Phos  5.0     09-06  Mg     2.40     09-06    TPro  5.5<L>  /  Alb  2.3<L>  /  TBili  <0.2  /  DBili  x   /  AST  18  /  ALT  14  /  AlkPhos  178<H>  09-05      Urinalysis Basic - ( 06 Sep 2023 06:10 )    Color: x / Appearance: x / SG: x / pH: x  Gluc: 181 mg/dL / Ketone: x  / Bili: x / Urobili: x   Blood: x / Protein: x / Nitrite: x   Leuk Esterase: x / RBC: x / WBC x   Sq Epi: x / Non Sq Epi: x / Bacteria: x        MICROBIOLOGY:  Vancomycin Level, Random: 19.2 ug/mL (09-06-23 @ 06:10)  v  .Blood Blood-Peripheral  09-04-23   No growth at 24 hours  --  --      .Bronchial Bronchial Lavage  09-01-23   Numerous Staphylococcus aureus Multiple Morphological Strains  Normal Respiratory Cate present  --  Staphylococcus aureus  Staphylococcus aureus      .Blood Blood-Peripheral  09-01-23   Growth in aerobic and anaerobic bottles: Staphylococcus aureus  Direct identification is available within approximately 3-5  hours either by Blood Panel Multiplexed PCR or Direct  MALDI-TOF. Details: [email or web address]  Growth in anaerobic bottle: blood culture bottle turned positive after  the final report was released.  --  Blood Culture PCR  Staphylococcus aureus      ET Tube ET Tube  09-01-23   Moderate Staphylococcus aureus  Moderate Escherichia coli ESBL  Normal Respiratory Cate present  --  Staphylococcus aureus  Escherichia coli ESBL      .Blood Blood-Peripheral  08-16-23   No growth at 5 days  --  --      .Blood Blood-Peripheral  08-16-23   No growth at 5 days  --  --      .Sputum Sputum  08-16-23   Normal Respiratory Cate present  --    Few polymorphonuclear leukocytes per low power field  Rare Squamous epithelial cells per low power field  Few Gram Positive Cocci in Clusters per oil power field      .Sputum Sputum  08-11-23   Numerous Staphylococcus aureus  --  Staphylococcus aureus      Clean Catch Clean Catch (Midstream)  08-11-23   No growth  --  --      .Blood Blood-Peripheral  08-11-23   No growth at 5 days  --  --                RADIOLOGY:  Images independently visualized and reviewed personally, findings as below  < from: Xray Chest 1 View- PORTABLE-Urgent (Xray Chest 1 View- PORTABLE-Urgent .) (09.01.23 @ 13:28) >  IMPRESSION: Status post tracheostomyplacement with no complicating   pneumomediastinum or pneumothorax.      < end of copied text >  < from: MR Head No Cont (08.17.23 @ 19:58) >  MOTION LIMITED STUDY. ACUTE INFARCT INVOLVING THE RIGHT CEREBELLUM. THERE   IS NO EVIDENCE OF ACUTE INTRACRANIAL HEMORRHAGE.    < end of copied text >  < from: Transthoracic Echocardiogram (09.06.23 @ 14:05) >  CONCLUSIONS:  Limited study to evaluate for endocarditis.  Unable to  exclude endocarditis.  Consider STEPHANIE for further evaluation,  if clinically indicated.    < end of copied text >

## 2023-09-06 NOTE — PROGRESS NOTE ADULT - PROBLEM SELECTOR PLAN 1
1. Continue abx as per ID,   2. Send Ear culture at bedside.   3. Continue with ciprodex drops.   4. Will discuss and update ENT attending

## 2023-09-06 NOTE — ADVANCED PRACTICE NURSE CONSULT - REASON FOR CONSULT
Patient seen on skin care rounds after wound care referral received for reassessment of skin impairment and recommendations of topical management. Since last assessment patient seen by Gastroenterology for coffee ground emesis, Interventional pulmonology for tracheostomy placement infectious disease for bacteremia and ENT for Left sided otitis externa. , Anna, at bedside.  Patient seen on skin care rounds after wound care referral received for reassessment of skin impairment and recommendations of topical management. Since last assessment patient seen by Gastroenterology for coffee ground emesis, Interventional pulmonology for tracheostomy placement infectious disease for bacteremia and ENT for Left sided otitis externa. , Jaidennata, at bedside. Chart reviewed: WBC 8.30, H/H 7.1/23.4, platelets 151, Serum albumin 2.3, (+) BC 9/1, Umer 13.

## 2023-09-06 NOTE — PROGRESS NOTE ADULT - SUBJECTIVE AND OBJECTIVE BOX
Subjective: Patient seen and examined. No new events except as noted.     REVIEW OF SYSTEMS:    Unable to obtain      MEDICATIONS:  MEDICATIONS  (STANDING):  albuterol/ipratropium for Nebulization 3 milliLiter(s) Nebulizer every 6 hours  atorvastatin 40 milliGRAM(s) Oral at bedtime  chlorhexidine 0.12% Liquid 15 milliLiter(s) Oral Mucosa every 12 hours  chlorhexidine 2% Cloths 1 Application(s) Topical daily  ciprofloxacin/hydrocortisone Suspension Otic 4 Drop(s) Left Ear two times a day  dextrose 5%. 1000 milliLiter(s) (50 mL/Hr) IV Continuous <Continuous>  dextrose 5%. 1000 milliLiter(s) (100 mL/Hr) IV Continuous <Continuous>  dextrose 50% Injectable 12.5 Gram(s) IV Push once  dextrose 50% Injectable 25 Gram(s) IV Push once  dextrose 50% Injectable 25 Gram(s) IV Push once  doxazosin 6 milliGRAM(s) Oral <User Schedule>  FIRST- Mouthwash  BLM 10 milliLiter(s) Swish and Spit four times a day  glucagon  Injectable 1 milliGRAM(s) IntraMuscular once  insulin lispro (ADMELOG) corrective regimen sliding scale   SubCutaneous every 6 hours  insulin NPH human recombinant 3 Unit(s) SubCutaneous every 6 hours  meropenem  IVPB 500 milliGRAM(s) IV Intermittent every 12 hours  pantoprazole  Injectable 40 milliGRAM(s) IV Push two times a day  predniSONE   Tablet 30 milliGRAM(s) Oral daily  sevelamer carbonate Powder 1600 milliGRAM(s) Oral three times a day  sodium chloride 3%  Inhalation 4 milliLiter(s) Inhalation every 6 hours      PHYSICAL EXAM:  T(C): 37.1 (09-06-23 @ 11:13), Max: 37.1 (09-06-23 @ 11:13)  HR: 85 (09-06-23 @ 15:24) (78 - 919)  BP: 131/43 (09-06-23 @ 11:13) (111/38 - 131/43)  RR: 12 (09-06-23 @ 11:13) (12 - 12)  SpO2: 100% (09-06-23 @ 15:24) (99% - 100%)  Wt(kg): --  I&O's Summary    05 Sep 2023 07:01  -  06 Sep 2023 07:00  --------------------------------------------------------  IN: 820 mL / OUT: 0 mL / NET: 820 mL    06 Sep 2023 07:01  -  06 Sep 2023 18:43  --------------------------------------------------------  IN: 510 mL / OUT: 0 mL / NET: 510 mL        Appearance: NAD, +trach  HEENT: dry oral mucosa  Lymphatic: No lymphadenopathy  Cardiovascular: Normal S1 S2, No JVD, No murmurs, No edema  Respiratory: Decreased BS, + trach to vent	  Neuro: opens eyes  Gastrointestinal: Soft, Non-tender, + BS, + OGT	  Skin: No rashes, No ecchymoses, No cyanosis	  Extremities: No strength/ROM 2/2 sedation, + BL LE edema  Vascular: Peripheral pulses palpable 2+ bilaterally    Mode: AC/ CMV (Assist Control/ Continuous Mandatory Ventilation), RR (machine): 12, TV (machine): 360, FiO2: 40, PEEP: 5, ITime: 0.87, MAP: 11, PIP: 44  LABS:  CARDIAC MARKERS:                                7.1    8.30  )-----------( 151      ( 06 Sep 2023 06:10 )             23.4     09-06    130<L>  |  94<L>  |  82<H>  ----------------------------<  181<H>  5.1   |  22  |  3.09<H>    Ca    8.3<L>      06 Sep 2023 06:10  Phos  5.0     09-06  Mg     2.40     09-06    TPro  5.5<L>  /  Alb  2.3<L>  /  TBili  <0.2  /  DBili  x   /  AST  18  /  ALT  14  /  AlkPhos  178<H>  09-05    proBNP:   Lipid Profile:   HgA1c:   TSH:             TELEMETRY: 	    ECG:  	  RADIOLOGY:   DIAGNOSTIC TESTING:  [ ] Echocardiogram:  [ ]  Catheterization:  [ ] Stress Test:    OTHER:

## 2023-09-06 NOTE — PROGRESS NOTE ADULT - SUBJECTIVE AND OBJECTIVE BOX
NEPHROLOGY     Patient seen and examined with  at bedside, trach to vent, in no acute distress.     MEDICATIONS  (STANDING):  albuterol/ipratropium for Nebulization 3 milliLiter(s) Nebulizer every 6 hours  atorvastatin 40 milliGRAM(s) Oral at bedtime  chlorhexidine 0.12% Liquid 15 milliLiter(s) Oral Mucosa every 12 hours  chlorhexidine 2% Cloths 1 Application(s) Topical daily  ciprofloxacin/hydrocortisone Suspension Otic 4 Drop(s) Left Ear two times a day  dextrose 5%. 1000 milliLiter(s) (50 mL/Hr) IV Continuous <Continuous>  dextrose 5%. 1000 milliLiter(s) (100 mL/Hr) IV Continuous <Continuous>  dextrose 50% Injectable 12.5 Gram(s) IV Push once  dextrose 50% Injectable 25 Gram(s) IV Push once  dextrose 50% Injectable 25 Gram(s) IV Push once  doxazosin 6 milliGRAM(s) Oral <User Schedule>  glucagon  Injectable 1 milliGRAM(s) IntraMuscular once  insulin lispro (ADMELOG) corrective regimen sliding scale   SubCutaneous every 6 hours  insulin NPH human recombinant 3 Unit(s) SubCutaneous every 6 hours  meropenem  IVPB 500 milliGRAM(s) IV Intermittent every 12 hours  pantoprazole  Injectable 40 milliGRAM(s) IV Push two times a day  predniSONE   Tablet 30 milliGRAM(s) Oral daily  sevelamer carbonate Powder 1600 milliGRAM(s) Oral three times a day  sodium chloride 3%  Inhalation 4 milliLiter(s) Inhalation every 6 hours    VITALS:  T(C): , Max: 37.1 (09-05-23 @ 18:39)  T(F): , Max: 98.7 (09-05-23 @ 18:39)  HR: 91 (09-06-23 @ 11:13)  BP: 111/38 (09-06-23 @ 07:33)  BP(mean): 57 (09-06-23 @ 07:33)  RR: 12 (09-06-23 @ 07:33)  SpO2: 100% (09-06-23 @ 11:13)    I and O's:    09-05 @ 07:01  -  09-06 @ 07:00  --------------------------------------------------------  IN: 820 mL / OUT: 0 mL / NET: 820 mL    PHYSICAL EXAM:  Constitutional: lethargic, nad  HEENT: (+)NGT  Neck: (+)trach-vent  Respiratory: coarse BS b/l  Cardiovascular: reg s1s2  Gastrointestinal: BS+, soft, NT/ND  Extremities: + peripheral edema  Neurological: tone WNL  : no Wheeler  Skin: No rashes  Access: [facility] HD catheter (8/19)    LABS:                        7.1    8.30  )-----------( 151      ( 06 Sep 2023 06:10 )             23.4     09-06    130<L>  |  94<L>  |  82<H>  ----------------------------<  181<H>  5.1   |  22  |  3.09<H>    Ca    8.3<L>      06 Sep 2023 06:10  Phos  5.0     09-06  Mg     2.40     09-06    TPro  5.5<L>  /  Alb  2.3<L>  /  TBili  <0.2  /  DBili  x   /  AST  18  /  ALT  14  /  AlkPhos  178<H>  09-05    Urine Studies:  Urinalysis Basic - ( 06 Sep 2023 06:10 )    Color: x / Appearance: x / SG: x / pH: x  Gluc: 181 mg/dL / Ketone: x  / Bili: x / Urobili: x   Blood: x / Protein: x / Nitrite: x   Leuk Esterase: x / RBC: x / WBC x   Sq Epi: x / Non Sq Epi: x / Bacteria: x

## 2023-09-06 NOTE — PROGRESS NOTE ADULT - SUBJECTIVE AND OBJECTIVE BOX
CHIEF COMPLAINT: Patient is a 73y old  Female who presents with a chief complaint of Respiratory distress (05 Sep 2023 19:32)      Interval Events:    REVIEW OF SYSTEMS:  [ ] All other systems negative  [ ] Unable to assess ROS because ________    Mode: AC/ CMV (Assist Control/ Continuous Mandatory Ventilation), RR (machine): 12, TV (machine): 360, FiO2: 40, PEEP: 5, ITime: 0.64, MAP: 12, PIP: 46      OBJECTIVE:  ICU Vital Signs Last 24 Hrs  T(C): 36.7 (06 Sep 2023 05:51), Max: 37.4 (05 Sep 2023 10:08)  T(F): 98 (06 Sep 2023 05:51), Max: 99.4 (05 Sep 2023 10:08)  HR: 90 (06 Sep 2023 07:33) (85 - 104)  BP: 115/86 (06 Sep 2023 05:51) (109/62 - 127/38)  BP(mean): 93 (06 Sep 2023 05:51) (55 - 93)  ABP: --  ABP(mean): --  RR: 12 (06 Sep 2023 05:51) (12 - 13)  SpO2: 100% (06 Sep 2023 07:33) (99% - 100%)    O2 Parameters below as of 06 Sep 2023 05:51  Patient On (Oxygen Delivery Method): ventilator, AC    O2 Concentration (%): 30      Mode: AC/ CMV (Assist Control/ Continuous Mandatory Ventilation), RR (machine): 12, TV (machine): 360, FiO2: 40, PEEP: 5, ITime: 0.64, MAP: 12, PIP: 46    09-05 @ 07:01  -  09-06 @ 07:00  --------------------------------------------------------  IN: 820 mL / OUT: 0 mL / NET: 820 mL      CAPILLARY BLOOD GLUCOSE      POCT Blood Glucose.: 183 mg/dL (06 Sep 2023 05:41)      HOSPITAL MEDICATIONS:  MEDICATIONS  (STANDING):  albuterol/ipratropium for Nebulization 3 milliLiter(s) Nebulizer every 6 hours  atorvastatin 40 milliGRAM(s) Oral at bedtime  chlorhexidine 0.12% Liquid 15 milliLiter(s) Oral Mucosa every 12 hours  chlorhexidine 2% Cloths 1 Application(s) Topical daily  ciprofloxacin/hydrocortisone Suspension Otic 4 Drop(s) Left Ear two times a day  dextrose 5%. 1000 milliLiter(s) (50 mL/Hr) IV Continuous <Continuous>  dextrose 5%. 1000 milliLiter(s) (100 mL/Hr) IV Continuous <Continuous>  dextrose 50% Injectable 12.5 Gram(s) IV Push once  dextrose 50% Injectable 25 Gram(s) IV Push once  dextrose 50% Injectable 25 Gram(s) IV Push once  doxazosin 6 milliGRAM(s) Oral <User Schedule>  glucagon  Injectable 1 milliGRAM(s) IntraMuscular once  insulin lispro (ADMELOG) corrective regimen sliding scale   SubCutaneous every 6 hours  insulin NPH human recombinant 3 Unit(s) SubCutaneous every 6 hours  meropenem  IVPB 500 milliGRAM(s) IV Intermittent every 12 hours  pantoprazole  Injectable 40 milliGRAM(s) IV Push two times a day  predniSONE   Tablet 30 milliGRAM(s) Oral daily  sevelamer carbonate Powder 1600 milliGRAM(s) Oral three times a day  sodium chloride 3%  Inhalation 4 milliLiter(s) Inhalation every 6 hours    MEDICATIONS  (PRN):  acetaminophen   Oral Liquid .. 650 milliGRAM(s) Oral every 6 hours PRN Temp greater or equal to 38C (100.4F), Mild Pain (1 - 3)  dextrose Oral Gel 15 Gram(s) Oral once PRN Blood Glucose LESS THAN 70 milliGRAM(s)/deciliter      LABS:                        7.1    8.30  )-----------( 151      ( 06 Sep 2023 06:10 )             23.4     09-06    130<L>  |  94<L>  |  82<H>  ----------------------------<  181<H>  5.1   |  22  |  3.09<H>    Ca    8.3<L>      06 Sep 2023 06:10  Phos  5.0     09-06  Mg     2.40     09-06    TPro  5.5<L>  /  Alb  2.3<L>  /  TBili  <0.2  /  DBili  x   /  AST  18  /  ALT  14  /  AlkPhos  178<H>  09-05      Urinalysis Basic - ( 06 Sep 2023 06:10 )    Color: x / Appearance: x / SG: x / pH: x  Gluc: 181 mg/dL / Ketone: x  / Bili: x / Urobili: x   Blood: x / Protein: x / Nitrite: x   Leuk Esterase: x / RBC: x / WBC x   Sq Epi: x / Non Sq Epi: x / Bacteria: x            PAST MEDICAL & SURGICAL HISTORY:  Breast CA      Diabetes      Stroke      Cardiac arrest      HTN (hypertension)      H/O mastectomy, bilateral          FAMILY HISTORY:  No pertinent family history in first degree relatives        Social History:      RADIOLOGY:  [ ] Reviewed and interpreted by me    PULMONARY FUNCTION TESTS:    EKG: CHIEF COMPLAINT: Patient is a 73y old  Female who presents with a chief complaint of Respiratory distress (05 Sep 2023 19:32)      Interval Events: No acute overnight events - ENT following for L ear drainage     REVIEW OF SYSTEMS:  [x ] Unable to assess ROS because trach    Mode: AC/ CMV (Assist Control/ Continuous Mandatory Ventilation), RR (machine): 12, TV (machine): 360, FiO2: 40, PEEP: 5, ITime: 0.64, MAP: 12, PIP: 46      OBJECTIVE:  ICU Vital Signs Last 24 Hrs  T(C): 36.7 (06 Sep 2023 05:51), Max: 37.4 (05 Sep 2023 10:08)  T(F): 98 (06 Sep 2023 05:51), Max: 99.4 (05 Sep 2023 10:08)  HR: 90 (06 Sep 2023 07:33) (85 - 104)  BP: 115/86 (06 Sep 2023 05:51) (109/62 - 127/38)  BP(mean): 93 (06 Sep 2023 05:51) (55 - 93)  ABP: --  ABP(mean): --  RR: 12 (06 Sep 2023 05:51) (12 - 13)  SpO2: 100% (06 Sep 2023 07:33) (99% - 100%)    O2 Parameters below as of 06 Sep 2023 05:51  Patient On (Oxygen Delivery Method): ventilator, AC    O2 Concentration (%): 30      Mode: AC/ CMV (Assist Control/ Continuous Mandatory Ventilation), RR (machine): 12, TV (machine): 360, FiO2: 40, PEEP: 5, ITime: 0.64, MAP: 12, PIP: 46    09-05 @ 07:01  -  09-06 @ 07:00  --------------------------------------------------------  IN: 820 mL / OUT: 0 mL / NET: 820 mL      CAPILLARY BLOOD GLUCOSE      POCT Blood Glucose.: 183 mg/dL (06 Sep 2023 05:41)      HOSPITAL MEDICATIONS:  MEDICATIONS  (STANDING):  albuterol/ipratropium for Nebulization 3 milliLiter(s) Nebulizer every 6 hours  atorvastatin 40 milliGRAM(s) Oral at bedtime  chlorhexidine 0.12% Liquid 15 milliLiter(s) Oral Mucosa every 12 hours  chlorhexidine 2% Cloths 1 Application(s) Topical daily  ciprofloxacin/hydrocortisone Suspension Otic 4 Drop(s) Left Ear two times a day  dextrose 5%. 1000 milliLiter(s) (50 mL/Hr) IV Continuous <Continuous>  dextrose 5%. 1000 milliLiter(s) (100 mL/Hr) IV Continuous <Continuous>  dextrose 50% Injectable 12.5 Gram(s) IV Push once  dextrose 50% Injectable 25 Gram(s) IV Push once  dextrose 50% Injectable 25 Gram(s) IV Push once  doxazosin 6 milliGRAM(s) Oral <User Schedule>  glucagon  Injectable 1 milliGRAM(s) IntraMuscular once  insulin lispro (ADMELOG) corrective regimen sliding scale   SubCutaneous every 6 hours  insulin NPH human recombinant 3 Unit(s) SubCutaneous every 6 hours  meropenem  IVPB 500 milliGRAM(s) IV Intermittent every 12 hours  pantoprazole  Injectable 40 milliGRAM(s) IV Push two times a day  predniSONE   Tablet 30 milliGRAM(s) Oral daily  sevelamer carbonate Powder 1600 milliGRAM(s) Oral three times a day  sodium chloride 3%  Inhalation 4 milliLiter(s) Inhalation every 6 hours    MEDICATIONS  (PRN):  acetaminophen   Oral Liquid .. 650 milliGRAM(s) Oral every 6 hours PRN Temp greater or equal to 38C (100.4F), Mild Pain (1 - 3)  dextrose Oral Gel 15 Gram(s) Oral once PRN Blood Glucose LESS THAN 70 milliGRAM(s)/deciliter      LABS:                        7.1    8.30  )-----------( 151      ( 06 Sep 2023 06:10 )             23.4     09-06    130<L>  |  94<L>  |  82<H>  ----------------------------<  181<H>  5.1   |  22  |  3.09<H>    Ca    8.3<L>      06 Sep 2023 06:10  Phos  5.0     09-06  Mg     2.40     09-06    TPro  5.5<L>  /  Alb  2.3<L>  /  TBili  <0.2  /  DBili  x   /  AST  18  /  ALT  14  /  AlkPhos  178<H>  09-05      Urinalysis Basic - ( 06 Sep 2023 06:10 )    Color: x / Appearance: x / SG: x / pH: x  Gluc: 181 mg/dL / Ketone: x  / Bili: x / Urobili: x   Blood: x / Protein: x / Nitrite: x   Leuk Esterase: x / RBC: x / WBC x   Sq Epi: x / Non Sq Epi: x / Bacteria: x            PAST MEDICAL & SURGICAL HISTORY:  Breast CA      Diabetes      Stroke      Cardiac arrest      HTN (hypertension)      H/O mastectomy, bilateral          FAMILY HISTORY:  No pertinent family history in first degree relatives        Social History:      RADIOLOGY:  [ ] Reviewed and interpreted by me    PULMONARY FUNCTION TESTS:    EKG:

## 2023-09-06 NOTE — PROGRESS NOTE ADULT - ASSESSMENT
72 y/o F DM on insulin, HTN, CKD,breast CA, respiratory arrest and cardiac arrest (2018), CVA with residual weakness, aspiration PNA h/o trache, PEG, both removed presents with respiratory distress requiring intubation. Had recent admission d/c 7/22/23 for cough and respiratory distress (no intubation) thought to be i/s/o aspiration PNA s/p cefepime course; developed rash in response. Infectious w/u was negative at this time. Was discharged home, daughter and  at bedside providing history. Patient admitted to MICU for higher level of care, intubated & sedated on fentanyl & propofol. While in the MICU, patient was observed to have RUE shaking. Ativan given & EEG started, pending final report. Neurology consulted for further recommendations. Patient unable to offer history at this time. At bedside, daughter &  offer collateral. As per daughter, patient follows with Dr. Garner for stroke management since 2018 and Dr. Poon for tremors. Patient's tremors usually consist of RUE or head shaking, can be stopped by the patient with effort; this has been ongoing for 2-3 years. However, as of the past 2-3 weeks, patient has been experiencing more frequent, more severe RUE shaking intermittently; during these episodes patient is awake and alert. Does not take medications for tremors. No hx of vocal tremors. No hx of seizure, incontinence, tongue bite. For memory loss, patient has been taking Donepezil. Family inquiring about taking alternative supplements instead of Donepezil. At baseline patient wheelchair/bedbound and requires assistance with ADLs. CTH obtained 8/11, no interval change noted. EEG w/o evidence of seizures. Concern for dysconjugate gaze and roving eye movements 8/15, repeat CTH unchanged but unable to visualize brainstem well. Leukocytosis increasing    Impression:   1) Worsening RUE shaking of unclear etiology at this time; no evidence of seizures, resolved   2) AMS possibly related to TME, no sz on EEG. Dysconjugate gaze and roving eye movements improved. CTH unchanged although not able to visualize brainstem well. Can consider uremic encephaloapthy as kidney function worsening/ infectious encephalopathy   3) L PCA stroke, ESUS s/p ILR, also with bilateral centrum semiovale watershed infarcts   4) New Embolic strokes seen on MRI 8/17 - R cerebellum, midbrain, multiple other tiny embolic infarcts. Central midbrain stroke may be responsible for inability to trigger vent   5) Dementia     Recommendations        74 y/o F DM on insulin, HTN, CKD,breast CA, respiratory arrest and cardiac arrest (2018), CVA with residual weakness, aspiration PNA h/o trache, PEG, both removed presents with respiratory distress requiring intubation. Had recent admission d/c 7/22/23 for cough and respiratory distress (no intubation) thought to be i/s/o aspiration PNA s/p cefepime course; developed rash in response. Infectious w/u was negative at this time. Was discharged home, daughter and  at bedside providing history. Patient admitted to MICU for higher level of care, intubated & sedated on fentanyl & propofol. While in the MICU, patient was observed to have RUE shaking. Ativan given & EEG started, pending final report. Neurology consulted for further recommendations. Patient unable to offer history at this time. At bedside, daughter &  offer collateral. As per daughter, patient follows with Dr. Garner for stroke management since 2018 and Dr. Poon for tremors. Patient's tremors usually consist of RUE or head shaking, can be stopped by the patient with effort; this has been ongoing for 2-3 years. However, as of the past 2-3 weeks, patient has been experiencing more frequent, more severe RUE shaking intermittently; during these episodes patient is awake and alert. Does not take medications for tremors. No hx of vocal tremors. No hx of seizure, incontinence, tongue bite. For memory loss, patient has been taking Donepezil. Family inquiring about taking alternative supplements instead of Donepezil. At baseline patient wheelchair/bedbound and requires assistance with ADLs. CTH obtained 8/11, no interval change noted. EEG w/o evidence of seizures. Concern for dysconjugate gaze and roving eye movements 8/15, repeat CTH unchanged but unable to visualize brainstem well. Leukocytosis increasing    Impression:   1) Worsening RUE shaking of unclear etiology at this time; no evidence of seizures, resolved   2) AMS possibly related to TME, no sz on EEG. Dysconjugate gaze and roving eye movements improved. CTH unchanged although not able to visualize brainstem well. Can consider uremic encephaloapthy as kidney function worsening/ infectious encephalopathy   3) L PCA stroke, ESUS s/p ILR, also with bilateral centrum semiovale watershed infarcts   4) New Embolic strokes seen on MRI 8/17 - R cerebellum, midbrain, multiple other tiny embolic infarcts. Central midbrain stroke may be responsible for inability to trigger vent   5) Dementia   6) MSSA bacteremia, r/o endocarditis    Recommendations       [] appreciate ID recs -  on Vanco for MSSA bacteremia and Meropenem for E Coli   [] repeat TTE or consider STEPHANIE to rule out endocarditis   [] family declined kidney biopsy for AIN -> being treated with steroids   [] C/w home ASA 81 mg if no contraindications   [] C/w home Atorvastatin 10 mg qhs   [] would interrogate ILR and review tele to see if pAF  [] DVT/GI ppx  [] Neurochecks q4hr   [] BP goals: Normotension   [] PT/OT when able   [] Diet: NPO w/ tube feeds, family considering PEG    [] HgA1C goals < 7.0     D/w  at bedside     Katty Charles DO  Vascular Neurology  Office [phone]

## 2023-09-06 NOTE — PROGRESS NOTE ADULT - SUBJECTIVE AND OBJECTIVE BOX
Patient is a 73y old  Female who presents with a chief complaint of Respiratory distress (06 Sep 2023 16:07)      SUBJECTIVE / OVERNIGHT EVENTS: no HD today, if requires HD this week, renal to discuss chronic HD w family. copious secretions from trache    MEDICATIONS  (STANDING):  albuterol/ipratropium for Nebulization 3 milliLiter(s) Nebulizer every 6 hours  atorvastatin 40 milliGRAM(s) Oral at bedtime  chlorhexidine 0.12% Liquid 15 milliLiter(s) Oral Mucosa every 12 hours  chlorhexidine 2% Cloths 1 Application(s) Topical daily  ciprofloxacin/hydrocortisone Suspension Otic 4 Drop(s) Left Ear two times a day  dextrose 5%. 1000 milliLiter(s) (50 mL/Hr) IV Continuous <Continuous>  dextrose 5%. 1000 milliLiter(s) (100 mL/Hr) IV Continuous <Continuous>  dextrose 50% Injectable 25 Gram(s) IV Push once  dextrose 50% Injectable 25 Gram(s) IV Push once  dextrose 50% Injectable 12.5 Gram(s) IV Push once  doxazosin 6 milliGRAM(s) Oral <User Schedule>  glucagon  Injectable 1 milliGRAM(s) IntraMuscular once  insulin lispro (ADMELOG) corrective regimen sliding scale   SubCutaneous every 6 hours  insulin NPH human recombinant 3 Unit(s) SubCutaneous every 6 hours  meropenem  IVPB 500 milliGRAM(s) IV Intermittent every 12 hours  pantoprazole  Injectable 40 milliGRAM(s) IV Push two times a day  predniSONE   Tablet 30 milliGRAM(s) Oral daily  sevelamer carbonate Powder 1600 milliGRAM(s) Oral three times a day  sodium chloride 3%  Inhalation 4 milliLiter(s) Inhalation every 6 hours    MEDICATIONS  (PRN):  acetaminophen   Oral Liquid .. 650 milliGRAM(s) Oral every 6 hours PRN Temp greater or equal to 38C (100.4F), Mild Pain (1 - 3)  dextrose Oral Gel 15 Gram(s) Oral once PRN Blood Glucose LESS THAN 70 milliGRAM(s)/deciliter      Vital Signs Last 24 Hrs  T(F): 97.3 (09-06-23 @ 07:33), Max: 98.7 (09-05-23 @ 18:39)  HR: 85 (09-06-23 @ 15:24) (78 - 102)  BP: 111/38 (09-06-23 @ 07:33) (109/62 - 127/38)  RR: 12 (09-06-23 @ 07:33) (12 - 13)  SpO2: 100% (09-06-23 @ 15:24) (99% - 100%)  Telemetry:   CAPILLARY BLOOD GLUCOSE      POCT Blood Glucose.: 199 mg/dL (06 Sep 2023 11:40)  POCT Blood Glucose.: 183 mg/dL (06 Sep 2023 05:41)  POCT Blood Glucose.: 284 mg/dL (06 Sep 2023 00:00)  POCT Blood Glucose.: 253 mg/dL (05 Sep 2023 17:42)    I&O's Summary    05 Sep 2023 07:01  -  06 Sep 2023 07:00  --------------------------------------------------------  IN: 820 mL / OUT: 0 mL / NET: 820 mL        PHYSICAL EXAM:  GENERAL: NAD, well-developed  HEAD:  Atraumatic, Normocephalic  EYES: EOMI, PERRLA, conjunctiva and sclera clear  NECK: Supple, No JVD  CHEST/LUNG: Clear to auscultation bilaterally; No wheeze  HEART: Regular rate and rhythm; No murmurs, rubs, or gallops  ABDOMEN: Soft, Nontender, Nondistended; Bowel sounds present  EXTREMITIES:  2+ Peripheral Pulses, No clubbing, cyanosis, or edema  PSYCH: AAOx3  NEUROLOGY: non-focal  SKIN: No rashes or lesions    LABS:                        7.1    8.30  )-----------( 151      ( 06 Sep 2023 06:10 )             23.4     09-06    130<L>  |  94<L>  |  82<H>  ----------------------------<  181<H>  5.1   |  22  |  3.09<H>    Ca    8.3<L>      06 Sep 2023 06:10  Phos  5.0     09-06  Mg     2.40     09-06    TPro  5.5<L>  /  Alb  2.3<L>  /  TBili  <0.2  /  DBili  x   /  AST  18  /  ALT  14  /  AlkPhos  178<H>  09-05          Urinalysis Basic - ( 06 Sep 2023 06:10 )    Color: x / Appearance: x / SG: x / pH: x  Gluc: 181 mg/dL / Ketone: x  / Bili: x / Urobili: x   Blood: x / Protein: x / Nitrite: x   Leuk Esterase: x / RBC: x / WBC x   Sq Epi: x / Non Sq Epi: x / Bacteria: x        RADIOLOGY & ADDITIONAL TESTS:    Imaging Personally Reviewed:    Consultant(s) Notes Reviewed:      Care Discussed with Consultants/Other Providers:

## 2023-09-06 NOTE — ADVANCED PRACTICE NURSE CONSULT - RECOMMEDATIONS
Topical recommendations:     Left ear- ENT following     Sacral/gluteal fold to bilateral buttocks- Cleanse with skin cleanser, pat dry. Apply TRIAD paste twice a day and PRN with incontinent episodes. With episodes of incontinence only remove soiled layer of Triad, then reinforce with thin layer.     Right 4th and 5th toes and heel- Apply LBF daily, continue to offload.     Continue low air loss bed therapy,  heel elevation with offloading boots, turn & reposition q2h with Z-flow fluidized pillow, continue moisture management with barrier creams as specified above & single breathable pad, continue measures to decrease friction/shear.   Plan discussed with  at bedside and primary RN.     Findings and recs discussed with Harpal Beltran ([facility]).     Please contact Wound/Ostomy Care Service Line if we can be of further assistance (ext [phone]).

## 2023-09-06 NOTE — PROGRESS NOTE ADULT - ASSESSMENT
73 f with DM, HTN, CKD, breast CA, latent TB (treated first with INH then had rash and switched to rifampin), MSSA bacteremia, c-diff, respiratory arrest and cardiac arrest (2018), s/p trach/PEG then removed, CVA with residual weakness, aspiration PNA, 1/4 sputums had MAC 7/2023, admitted 8/11 with respiratory failure no fever or WBC but was intubated and then spiked  blood cx negative, sputum cx with MSSA  s/p vanco and aztreonam 8/11  s/p cefepime 8/12 and had ?rash  s/p cefazolin 8/13-8/14  head MRI 8/17 with acute cerebellar infarct  had renal failure, AIN? family declined renal biopsy started on prednisone  s/p trach 9/1 and BAL with MSSA   ET cx with ESBL and MSSA  started on ana cristina 9/1  blood cx 9/1: MSSA  CXR with b/l opacities, R increased    initial  resp failure for ?fluid ovrerload but also had MSSA in the sputum, got vanco, aztreonam one day then cefepime and had rash, renal failure which was considered due to cefepime and then received 2 days of cefazolin and then off, now with trach and bronc on 9/1 with MSSA bacteremia and BAL also MSSA  another ET cx with MSSA and ESBL E-coli  hypotension, MSSA bacteremia likely due to pneumonia, repeat blood cx negative 9/4, TTE limited unable to exclude endocarditis  otitis externa and oral lesions  * f/u the ear cx  * cannot do cefazolin so will have to treat with vanco  * if there is plan for regular HD then 500 post HD, but nephro is holding off so would do per level  * not sure if the ESBL E-coli is playing any role but pt also has otitis external so continue with ana cristina for now  * if fever repeat blood cx  * monitor CBC/diff and renal function    The above assessment and plan was discussed with the primary team    Yumiko Conner MD  contact on teams  After 5pm and on weekends call [phone]

## 2023-09-06 NOTE — PROGRESS NOTE ADULT - SUBJECTIVE AND OBJECTIVE BOX
Patient on vent. and nonverbal. Appears to be in some distress due to increased pain from tongue biting. Evaluated by dental service.     AVSS, on vent.  HEENT:  left lateral tongue trauma secondary to biting. No active bleeding.   R ear: No mastoid tenderness, No Auricle tenderness. EAC patent with no effusion or exudates. No VERO.  L Ear; No mastoid tenderness. + Auricle tenderness to palpation. Left Auricle mass/trauma with serous exudate. removed wick. EAC edema with serous discharge. Debrided unable to visualize TM due to EAC edema. Wick replaced.

## 2023-09-06 NOTE — PROGRESS NOTE ADULT - ASSESSMENT
73 year old female with intermittent episodes of epistaxis, DM on insulin, HTN, CKD, CHFpEF, breast CA s/p mastectomy, Latent Tb, respiratory failure and cardiac arrest (2018), CVA with residual weakness, aspiration PNA h/o trach and prior PEG for dysphagia in 2018, both removed who initially presented to the ED with respiratory distress requiring intubation and remains intubated, PNA-Staph aureus, OGT placed with worsening renal function and concerns for ATN vs AIN, started on prednisone 40 on 8/18 /w/o PPI.    #PEG evaluation  #Dark maroon stool, resolved  #Anemia  #AMS  #Acute on chronic respiratory failure  #Otitis externa  -EGD/colonoscopy 8/31/2023 reveals LA Grade D esophagitis. Suspect likely etiology of prior coffee ground emesis.  Non-bleeding erosive gastropathy. No specimens collected.  Normal examined duodenum.  No active source of GI bleeding identified on this exam.  Preparation of the colon was inadequate for screening purposes.  Old red blood was found in the entire colon. This was cleared with fair visualization with no active bleeding source identified on this examination.  There was old red blood in the terminal ileum, but this was cleared with underlying bilious fluid. Suspect likely refluxed blood into TI after enema administration, lower suspicion for small bowel bleed though this is not definitively ruled out.  No specimens collected.    Recommendations:  -trend vitals, CBC, CMP, and monitor for clinical signs of bleeding  -maintain active type and screen  -transfusion goal to maintain hemoglobin >/= 7.0 and platelets >/= 50  -avoid NSAIDs  -PPI IV BID for projected 8 week course followed by once daily  -If patient rebleeds, recommend STAT CTA to identify source with IR consultation if positive for evaluation of embolization  -regarding PEG, patient's  at bedside wanted to discuss further with his daughters following prolonged discussion regarding benefits PEG placement and potential risks [bleeding, infection, perforation, aspiration, dislodgement, emergency surgery, death]  -would recommend optimization of respiratory failure and infection [?aspiration vs otitis externa] prior to proceeding with endoscopic PEG placement    Note incomplete until finalized by attending signature/attestation.    Geremias Olmedo  GI/Hepatology Fellow    MONDAY-FRIDAY 8AM-5PM:  Pager# [phone] ([facility]) or [phone] ([facility])    NON-URGENT CONSULTS:  Please email [email or web address]  AT NIGHT AND ON WEEKENDS:  Contact on-call GI fellow via answering service ([phone]) from 5pm-8am and on weekends/holidays

## 2023-09-06 NOTE — PROGRESS NOTE ADULT - ASSESSMENT
72 y/o F well known to me from my housecal outptn practice. she was admitted at [facility] 7/12-7/22 w aspiration PNA, was treated w CEFEPIME, developed an allergic rash,  dCHF, + MAC on AFB culture, had been progressively getting more and more lethargic and dyspneic at home since DC.   In  am of 8/11/23  ptn presented with respiratory distress w hypoxia and hypercarbia requiring intubation 2/2 volume overload +/- Asp PNA      Neuro   responds appropriately   Baseline MS AOx3, aphasic   - h/o CVA , on aspirin and statin . 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 Mcnulty, now in RCU  s/p septic shock overnight 9/1, now on meropenem, HDS  s/p trache, on vent support, copious secretions      Renal   Hyperkalemia with EKG changes  , initially treated w LOKELMA,  now resolved   Ptn well know to Dr. Colon, consult reviewed    HD 8/19,  8/21, 8/26 and 8/28.  s/p PUF 8/29 2.5 Liters, HD 8/30,  9/1, 9/4  no HD today, if requires HD this week, renal to discuss chronic HD w family  renal biopsy has been deferred , ASA  resumed, AC on hold    GI  NPO  on tube feeds  coffee ground emesis x 1 8/24, melena overnight 8/31, s/p 1UPRBC, EGD/Colonoscopy: erosive gastropathy, esophagisits, on colonoscopy old blood seen no active bleeding, poor prep  family to decide re PEG placement    Endocrine  T2DM   A1C 7.1 in 7/2023   - BG goal 140-200     ID   No fever/leukocytosis, recheck temp   Hx latent TB which was treated, no concern for TB   - grew MAC on AFB culture from most recent admission. at [facility]  -MSSA Bacteremia 9/1, allergic to cephalosprins and PCN, on Vanco as per ID, level 20,   - sputum also grew E.Coli-ESBL, on [place] through 9/7( 1 week course as per ID) , afebrile      Heme/Onc  ppx: heparin q8 hrs     Ethics  GOC - Discussed GOC with daughter and , they have opted in the past for full code. and she remains full code at present

## 2023-09-06 NOTE — CONSULT NOTE ADULT - SUBJECTIVE AND OBJECTIVE BOX
Daughter present within the room.   Pt intubated.  Extraoral Exam: No apparent swelling on the gross exam. The skin of the face did not exhibit any gross pathological change.  Intraoral Exam: multiple whitish-yellow ulcerations present on left dorsum, lateral, ventral tongue. Lesions appear unilateral and do not cross midline.   Patient has complained of pain associated with these lesions.   All remaining unaffected oral mucosal surfaces appear with no visible signs of inflammation, infection, neoplasia, or other pathology.

## 2023-09-06 NOTE — PROGRESS NOTE ADULT - ASSESSMENT
IMPRESSION: 73F w/ HTN, DM2, CVA, breast CA-bilateral mastectomy, recurrent aspiration pneumonia/respiratory failure, and CKD, 8/11/23 p/w acute hypercapnic respiratory failure; c/b RUSS    (1)CKD - stage 4     (2)RUSS - ATN vs AIN - s/p empiric Prednisone course for potential AIN; now being weaned down. Last dialyzed 9/1/23. No urgent need for HD today. Whereas I assume further HD will be needed, the family would like every opportunity to be given to avoid HD if possible. I would like to try to hold off with HD until we absolutely require it from this point forward...and if she requires further HD, we will plan for long-term HD/placement of tunneled cath at that point.    (3)Pulm- hypercapnic respiratory failure on admission - now s/p trach; remains on vent    (4)CV - normotensive    (5)ID - on IV Meropenem/Vanco       IMPRESSION: 73F w/ HTN, DM2, CVA, breast CA-bilateral mastectomy, recurrent aspiration pneumonia/respiratory failure, and CKD, 8/11/23 p/w acute hypercapnic respiratory failure; c/b RUSS    (1)CKD - stage 4     (2)RUSS - ATN vs AIN - s/p empiric Prednisone course for potential AIN; now being weaned down. Last dialyzed 9/1/23. No urgent need for HD today. Whereas I assume further HD will be needed, the family would like every opportunity to be given to avoid HD if possible. I would like to try to hold off with HD until we absolutely require it from this point forward...and if she requires further HD, we will plan for long-term HD/placement of tunneled cath at that point.    (3)Pulm- hypercapnic respiratory failure on admission - now s/p trach; remains on vent    (4)CV - normotensive    (5)ID - on IV Meropenem/Vanco    RECOMMEND:  (1)No HD today; if to require HD later this week, will d/w family regarding chronic long-term HD  (2)Prednisone taper down by 10mg/d  (3)Dose new meds for GFR <15ml/min    D/w Dr. Isaiah Espinoza, [facility]  [phone]            IMPRESSION: 73F w/ HTN, DM2, CVA, breast CA-bilateral mastectomy, recurrent aspiration pneumonia/respiratory failure, and CKD, 8/11/23 p/w acute hypercapnic respiratory failure; c/b RUSS    (1)CKD - stage 4     (2)RUSS - ATN vs AIN - s/p empiric Prednisone course for potential AIN; now being weaned down. Last dialyzed 9/1/23. No urgent need for HD today. Whereas I assume further HD will be needed, the family would like every opportunity to be given to avoid HD if possible. I would like to try to hold off with HD until we absolutely require it from this point forward...and if she requires further HD, we will plan for long-term HD/placement of tunneled cath at that point.    (3)Pulm- hypercapnic respiratory failure on admission - now s/p trach; remains on vent    (4)CV - normotensive    (5)ID - on IV Meropenem/Vanco    RECOMMEND:  (1)No HD today; if to require HD later this week, will d/w family regarding chronic long-term HD  (2)Prednisone taper down by 10mg/d  (3)Dose new meds for GFR <15ml/min    D/w Dr. Isaiah Espinoza, [facility]  [facility]  [phone]       RENAL ATTENDING NOTE  Patient seen and examined with NP. Agree with assessment and plan as above.    No need for HD just yet  D/w'd family at bedside    Jimmy Colon MD  [facility]  [phone]

## 2023-09-06 NOTE — PROGRESS NOTE ADULT - SUBJECTIVE AND OBJECTIVE BOX
Interval Events:   Patient remains trach'ed in RCU.  Family at bedside.    ROS:   Unable to fully obtain ROS.    Hospital Medications:  acetaminophen   Oral Liquid .. 650 milliGRAM(s) Oral every 6 hours PRN  albuterol/ipratropium for Nebulization 3 milliLiter(s) Nebulizer every 6 hours  atorvastatin 40 milliGRAM(s) Oral at bedtime  chlorhexidine 0.12% Liquid 15 milliLiter(s) Oral Mucosa every 12 hours  chlorhexidine 2% Cloths 1 Application(s) Topical daily  ciprofloxacin/hydrocortisone Suspension Otic 4 Drop(s) Left Ear two times a day  dextrose 5%. 1000 milliLiter(s) IV Continuous <Continuous>  dextrose 5%. 1000 milliLiter(s) IV Continuous <Continuous>  dextrose 50% Injectable 12.5 Gram(s) IV Push once  dextrose 50% Injectable 25 Gram(s) IV Push once  dextrose 50% Injectable 25 Gram(s) IV Push once  dextrose Oral Gel 15 Gram(s) Oral once PRN  doxazosin 6 milliGRAM(s) Oral <User Schedule>  glucagon  Injectable 1 milliGRAM(s) IntraMuscular once  insulin lispro (ADMELOG) corrective regimen sliding scale   SubCutaneous every 6 hours  insulin NPH human recombinant 3 Unit(s) SubCutaneous every 6 hours  meropenem  IVPB 500 milliGRAM(s) IV Intermittent every 12 hours  pantoprazole  Injectable 40 milliGRAM(s) IV Push two times a day  predniSONE   Tablet 30 milliGRAM(s) Oral daily  sevelamer carbonate Powder 1600 milliGRAM(s) Oral three times a day  sodium chloride 3%  Inhalation 4 milliLiter(s) Inhalation every 6 hours      PHYSICAL EXAM:   Vital Signs:  Vital Signs Last 24 Hrs  T(C): 36.3 (06 Sep 2023 07:33), Max: 37.1 (05 Sep 2023 18:39)  T(F): 97.3 (06 Sep 2023 07:33), Max: 98.7 (05 Sep 2023 18:39)  HR: 91 (06 Sep 2023 11:13) (78 - 104)  BP: 111/38 (06 Sep 2023 07:33) (109/62 - 127/38)  BP(mean): 57 (06 Sep 2023 07:33) (55 - 93)  RR: 12 (06 Sep 2023 07:33) (12 - 13)  SpO2: 100% (06 Sep 2023 11:13) (100% - 100%)    Parameters below as of 06 Sep 2023 09:13  Patient On (Oxygen Delivery Method): ventilator      Daily     Daily     GENERAL: nonverbal but awake  NEURO: not fully alert/oriented  HEENT: NG tube in place, NCAT, no conjunctival pallor appreciated  CHEST: trach, mechanical breath sounds  CARDIAC: regular rate, +S1/S2  ABDOMEN: soft, nontender, no rebound or guarding  EXTREMITIES: warm, well perfused  SKIN: no lesions noted    LABS: reviewed                        7.1    8.30  )-----------( 151      ( 06 Sep 2023 06:10 )             23.4     09-06    130<L>  |  94<L>  |  82<H>  ----------------------------<  181<H>  5.1   |  22  |  3.09<H>    Ca    8.3<L>      06 Sep 2023 06:10  Phos  5.0     09-06  Mg     2.40     09-06    TPro  5.5<L>  /  Alb  2.3<L>  /  TBili  <0.2  /  DBili  x   /  AST  18  /  ALT  14  /  AlkPhos  178<H>  09-05    LIVER FUNCTIONS - ( 05 Sep 2023 06:00 )  Alb: 2.3 g/dL / Pro: 5.5 g/dL / ALK PHOS: 178 U/L / ALT: 14 U/L / AST: 18 U/L / GGT: x             Interval Diagnostic Studies: see sunrise for full report

## 2023-09-06 NOTE — ADVANCED PRACTICE NURSE CONSULT - ASSESSMENT
Patient on mechanical ventilation via tracheostomy, FiO2 40%. + left NGT. Incontinent of stool, oliguria on HD via right neck permacath. Skin warm, dry with increased moisture in intertriginous folds, adequate skin turgor, hyperpigmentation to bilateral elbows.    Left ear edematous with ashley in place, small serous drainage. Aruna noted with maceration unclear from moisture or from debridement? noted from ENT note. Unable to insert anything in the ear at this time- ENT following.     Sacral/gluteal fold  to bilateral buttocks- with severe moisture and incontinence associated dermatitis complicated by frictional forces as evidenced by multiple erosions exposing pink moist dermis with small serosanguinous drainage. Irregular borders. Over soft tissue. No evidence of candidiasis.     -Right 4th and 5th toe with fading erythema    -Right heel with hypo/hyperpigmentation and dry flaky skin as evidence of healed stage 2 pressure injury.     Patient continues to be at high risk for skin breakdown. On assessment patient on a low air loss surface, heel offloading boots in place, Z-flow positioning pillow utilized, moisture management in place with use of one breathable incontinence pad. Turned and positioned per protocol.

## 2023-09-06 NOTE — PROGRESS NOTE ADULT - SUBJECTIVE AND OBJECTIVE BOX
S: Patient seen and examined.   S/p trach      Medications: MEDICATIONS  (STANDING):  albuterol/ipratropium for Nebulization 3 milliLiter(s) Nebulizer every 6 hours  atorvastatin 40 milliGRAM(s) Oral at bedtime  chlorhexidine 0.12% Liquid 15 milliLiter(s) Oral Mucosa every 12 hours  chlorhexidine 2% Cloths 1 Application(s) Topical daily  ciprofloxacin/hydrocortisone Suspension Otic 4 Drop(s) Left Ear two times a day  dextrose 5%. 1000 milliLiter(s) (50 mL/Hr) IV Continuous <Continuous>  dextrose 5%. 1000 milliLiter(s) (100 mL/Hr) IV Continuous <Continuous>  dextrose 50% Injectable 25 Gram(s) IV Push once  dextrose 50% Injectable 25 Gram(s) IV Push once  dextrose 50% Injectable 12.5 Gram(s) IV Push once  doxazosin 6 milliGRAM(s) Oral <User Schedule>  glucagon  Injectable 1 milliGRAM(s) IntraMuscular once  insulin lispro (ADMELOG) corrective regimen sliding scale   SubCutaneous every 6 hours  insulin NPH human recombinant 3 Unit(s) SubCutaneous every 6 hours  meropenem  IVPB 500 milliGRAM(s) IV Intermittent every 12 hours  pantoprazole  Injectable 40 milliGRAM(s) IV Push two times a day  predniSONE   Tablet 30 milliGRAM(s) Oral daily  sevelamer carbonate Powder 1600 milliGRAM(s) Oral three times a day  sodium chloride 3%  Inhalation 4 milliLiter(s) Inhalation every 6 hours    MEDICATIONS  (PRN):  acetaminophen   Oral Liquid .. 650 milliGRAM(s) Oral every 6 hours PRN Temp greater or equal to 38C (100.4F), Mild Pain (1 - 3)  dextrose Oral Gel 15 Gram(s) Oral once PRN Blood Glucose LESS THAN 70 milliGRAM(s)/deciliter       Vitals:  Vital Signs Last 24 Hrs  T(C): 36.3 (06 Sep 2023 07:33), Max: 37.1 (05 Sep 2023 18:39)  T(F): 97.3 (06 Sep 2023 07:33), Max: 98.7 (05 Sep 2023 18:39)  HR: 85 (06 Sep 2023 15:24) (78 - 102)  BP: 111/38 (06 Sep 2023 07:33) (109/62 - 127/38)  BP(mean): 57 (06 Sep 2023 07:33) (57 - 93)  RR: 12 (06 Sep 2023 07:33) (12 - 13)  SpO2: 100% (06 Sep 2023 15:24) (99% - 100%)    Parameters below as of 06 Sep 2023 15:24  Patient On (Oxygen Delivery Method): ventilator              Neurological Exam:  Mental Status: Eyes open, looking around, follows simple commands. + trach and NGT   Cranial Nerves: PERRL slightly sluggish, R gaze preference but can bring eyes to midline, decreased BTT on the L?  Motor: Moves upper > lower extremities spontaneously with less movement in RUE  Sensation: WD to noxious x4  Reflexes: 1+ throughout at biceps, brachioradialis, triceps, patellars and ankles bilaterally and equal. No clonus.     I personally reviewed the below data/images/labs:     LABS:                          7.1    8.30  )-----------( 151      ( 06 Sep 2023 06:10 )             23.4     09-06    130<L>  |  94<L>  |  82<H>  ----------------------------<  181<H>  5.1   |  22  |  3.09<H>    Ca    8.3<L>      06 Sep 2023 06:10  Phos  5.0     09-06  Mg     2.40     09-06    TPro  5.5<L>  /  Alb  2.3<L>  /  TBili  <0.2  /  DBili  x   /  AST  18  /  ALT  14  /  AlkPhos  178<H>  09-05    LIVER FUNCTIONS - ( 05 Sep 2023 06:00 )  Alb: 2.3 g/dL / Pro: 5.5 g/dL / ALK PHOS: 178 U/L / ALT: 14 U/L / AST: 18 U/L / GGT: x             Urinalysis Basic - ( 06 Sep 2023 06:10 )    Color: x / Appearance: x / SG: x / pH: x  Gluc: 181 mg/dL / Ketone: x  / Bili: x / Urobili: x   Blood: x / Protein: x / Nitrite: x   Leuk Esterase: x / RBC: x / WBC x   Sq Epi: x / Non Sq Epi: x / Bacteria: x              < from: CT Head No Cont (08.15.23 @ 17:20) >    ACC: [id number] EXAM:  CT BRAIN   ORDERED BY: MINAL SAM     PROCEDURE DATE:  08/15/2023          INTERPRETATION:  Clinical indication: Change in neuro exam.    Multiple axial sections were performed from base to vertex without   contrast enhancement. Coronal and sagittal reconstructions were   reformatted well.    This exam is compared prior head CT performed on August 11, 2023    Parenchymal volume loss and chronic microvessel ischemic changes are   again seen.    Abnormal low-attenuation involving the left occipital cortical   subcortical region is again seen. 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    No evidence of acute hemorrhage mass or mass effect is seen.    Evaluation of the osseous structures with appropriate window demonstrates   sclerotic changes about the left mastoid region which appears stable.   Opacification left middle ear region is again seen.    Patient is status post bilateral cataract surgery.    Impression: Stable exam.    < end of copied text >    vEEG:    Clinical Impression:  - Severe diffuse non-specific cerebral dysfunction  - There were no epileptiform abnormalities or seizures recorded.        CTH 8/11:    VENTRICLES AND SULCI: Age-appropriate involutional change  INTRA-AXIAL:  Old left PCA infarct as seen on the prior unchanged.   Microvascular ischemic changes involving the periventricular and   subcortical white matter as seen previously  EXTRA-AXIAL:  No mass or collection is seen.  VISUALIZED SINUSES:  Clear.  VISUALIZED MASTOIDS: Left mastoid sclerosis  CALVARIUM: Infiltrative appearance tothe calvarium may be indicative of   marrow infiltration on the basis of patient's known diagnosis of breast   cancer. MISCELLANEOUS:  None.    IMPRESSION:  No significant interval change compared with 7/17/2023 in   left PCA infarct which is old. Microvascular ischemic changes involving   the periventricular and subcortical white matter as seen   previously.Questionable lesions at the level of the calvarium related to   possible breast CA. Clinical correlation recommended.    --- End of Report ---

## 2023-09-06 NOTE — PROGRESS NOTE ADULT - ASSESSMENT
72 y/o F DM on insulin, HTN, CKD, CHFpEF, breast CA, respiratory arrest and cardiac arrest (2018), CVA with residual weakness, aspiration PNA h/o trache, PEG, both removed presents with respiratory distress requiring intubation. May be volume overload i/s/o CHF/CKD vs decrease respiratory drive (given oxygen at home). Improving mental status however worsening renal function and anuria, concerning for ATN vs AIN. Course c/b an episode of coffee ground emesis and melena, for which GI was consulted and did EGD, colonoscopy, which found erosive gastropathy, and recommended to start on PPI BID. Also found to have new fevers likely VAP and MSSA bacteremia. Prolonged Vent time and trach'd. 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     Cardiac   Labile blood pressure - ranging between SBP 80s-200s on 8/31  - Labetalol 600 TID - overnight became hypotensive/bradycardic ? iso BB toxicity. Labetalol is renally cleared, consider restarting carvedilol if becomes hypertensive  - Clonidine increased to 0.3 TID - held as on pressors  - Home Cardura - held as on pressors  - Per discussion, order of adding BP meds: 1) Cardura 2) carvedilol, 3) clonidine  - Portable TTE pending i/s/o  MSSA bacteremia     #CHFpEF   - TTE 7/2023 with EF 59%, with severe LVH and diastolic dysfunction   - pro-BNP > 61525, trop 78   - Repeat TTE with EF 60% normal systolic and grade 1 diastolic dysfunction     Pulmonary   #Acute hypercapnic and hypoxemic respiratory failure   - DDx: aspiration vs volume overload vs sedating medication decreasing drive (was given Nyquil)   - s/p trach  - Pt w/ copious thick secretions and c/w airway clearance (NS3%, and Duoneb)    /Renal   #RUSS on CKD  - Ddx: obstructive (willard in, no hydro on POCUS) vs low flow state vs AIN i/s/o cephalosporin use and drug rash   - kidney biopsy to r/o AIN planned 8/22, however family felt procedure too risky and so deferred  - cont empiric prednisone 40mg started 8/18   - Continue to monitor, dose meds per eGFR. avoid nephrotoxic agents  - No HD today  - Strict IOs per nephro; Willard removed with positive UA  - S/p prednisone 40 for two week course ending 9/1, will taper 10 per day per nephro  - c/w Prednisone taper (starting 9/3) - c/w 30 mg qd    # Hyperphosphatemia  - Continue to monitor BMP q12  - Started on Renvela powder TID per nephro recs    GI  - Had one episode of coffee ground emesis 8/24, two episodes melena 8/31  - GI consulted, scope showed erosive gastropathy  - Continue PPI BID for 8 weeks  - Continue to monitor hemoglobin  - GI consulted again regarding PEG placement, follow up recs  - On orogastric tube feeds currently   - Monitor for bowel movements    Endocrine  #T2DM   - ISS  - Increased NPH to 3units pre meals  - Will continue to monitor BG and adjust to keep -180    ID   Started on empiric vancomycin and aztreonam (cefepime/zosyn allergy? developed rash req prednisone)   Trialed cefepime --> cefazolin (sputum MSSA), developed drug eruption - discontinued   UA +, Willard DC'd  Per ID pharmacist, started on meropenem  - ID following and recommended to continue Bradford and Vanc Post HD if on HD or by level if not on HD.  - Vanc Random level on 9/5: 20.1  - L ear otitis externa- ENT following - recommend Ciprodex to wick BID for now  - f/u with Dental regarding concern for L lower mandibular area    Heme/Onc  ppx: heparin q8 hrs - held as hemoglobin downtrending, asa held for the same  - Continue to monitor CBC  - SCDs ordered    Ethics  GOC - Per previous GOC with daughter and , reported that they have not talked about end of life care with the patient. For now, they wanted "everything to be done" including CPR, continuing with mech ventilation/intubation, pressors   72 y/o F DM on insulin, HTN, CKD, CHFpEF, breast CA, respiratory arrest and cardiac arrest (2018), CVA with residual weakness, aspiration PNA h/o trache, PEG, both removed presents with respiratory distress requiring intubation. May be volume overload i/s/o CHF/CKD vs decrease respiratory drive (given oxygen at home). Improving mental status however worsening renal function and anuria, concerning for ATN vs AIN. Course c/b an episode of coffee ground emesis and melena, for which GI was consulted and did EGD, colonoscopy, which found erosive gastropathy, and recommended to start on PPI BID. Also found to have new fevers likely VAP and MSSA bacteremia. Prolonged Vent time and trach'd. 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     Cardiac   Labile blood pressure - ranging between SBP 80s-200s on 8/31  - Labetalol 600 TID - overnight became hypotensive/bradycardic ? iso BB toxicity. Labetalol is renally cleared, consider restarting carvedilol if becomes hypertensive  - Clonidine increased to 0.3 TID - held as on pressors  - Home Cardura - held as on pressors  - Per discussion, order of adding BP meds: 1) Cardura 2) carvedilol, 3) clonidine  - Portable TTE pending i/s/o  MSSA bacteremia - Echo unable to eval R/O  for endocarditis     #CHFpEF   - TTE 7/2023 with EF 59%, with severe LVH and diastolic dysfunction   - pro-BNP > 05773, trop 78   - Repeat TTE with EF 60% normal systolic and grade 1 diastolic dysfunction     Pulmonary   #Acute hypercapnic and hypoxemic respiratory failure   - DDx: aspiration vs volume overload vs sedating medication decreasing drive (was given Nyquil)   - s/p trach  - Pt w/ copious thick secretions and c/w airway clearance (NS3%, and Duoneb)    /Renal   #RUSS on CKD  - Ddx: obstructive (willard in, no hydro on POCUS) vs low flow state vs AIN i/s/o cephalosporin use and drug rash   - kidney biopsy to r/o AIN planned 8/22, however family felt procedure too risky and so deferred  - cont empiric prednisone 40mg started 8/18   - Continue to monitor, dose meds per eGFR. avoid nephrotoxic agents  - No HD today  - Strict IOs per nephro; Willard removed with positive UA  - S/p prednisone 40 for two week course ending 9/1, will taper 10 per day per nephro  - c/w Prednisone taper (starting 9/3) - c/w 30 mg qd    # Hyperphosphatemia  - Continue to monitor BMP q12  - Started on Renvela powder TID per nephro recs    GI  - Had one episode of coffee ground emesis 8/24, two episodes melena 8/31  - GI consulted, scope showed erosive gastropathy  - Continue PPI BID for 8 weeks  - Continue to monitor hemoglobin  - GI consulted again regarding PEG placement, follow up recs  - On orogastric tube feeds currently   - Monitor for bowel movements    Endocrine  #T2DM   - ISS  - Increased NPH to 3units pre meals  - Will continue to monitor BG and adjust to keep -180    ID   Started on empiric vancomycin and aztreonam (cefepime/zosyn allergy? developed rash req prednisone)   Trialed cefepime --> cefazolin (sputum MSSA), developed drug eruption - discontinued   UA +, Willard DC'd  Per ID pharmacist, started on meropenem  - ID following and recommended to continue Bradford and Vanc Post HD if on HD or by level if not on HD.  - Vanc Random level on 9/5: 20.1  - L ear otitis externa- ENT following - recommend Ciprodex to wick BID for now  - f/u with Dental regarding concern for L lower mandibular area- oral lesions ? varicella zoster   - Magic mouth wash (First)     Heme/Onc  ppx: heparin q8 hrs - held as hemoglobin downtrending, asa held for the same  - Continue to monitor CBC  - SCDs ordered    Ethics  GOC - Per previous GOC with daughter and , reported that they have not talked about end of life care with the patient. For now, they wanted "everything to be done" including CPR, continuing with mech ventilation/intubation, pressors

## 2023-09-07 NOTE — CHART NOTE - NSCHARTNOTEFT_GEN_A_CORE
Pt seen for nutrition consult: Nutrition support team     Medical Course: Per chart 73 year old female DM, HTN, CKD, CHFpEF, breast Ca, respiratory and cardiac arrest (2018), CVA w residual weakness s/p trach/PEG which has since been removed. Presenting with respiratory distress requiring intubation. Pt with acute hypoxic respiratory failure with hypoxia and hypercapnia which is multifactorial including aspiration and acute on chronic diastolic CHF. Pt also RUSS requiring HD.    Nutrition Course: Unable to conduct nutrition interview 2/2 cognitive status, daughter at bedside to provide information. Pt NPO with NGT in place receiving all nutrition/hydration from TF of Nepro @40ml/hr x24hrs to provide 960ml total formula, 1728kcal, 77g pro    Diet Prescription: Diet, NPO with Tube Feed:   Tube Feeding Modality: Nasogastric  Nepro with Carb Steady (NEPRORTH)  Total Volume for 24 Hours (mL): 960  Continuous  Starting Tube Feed Rate {mL per Hour}: 10  Increase Tube Feed Rate by (mL): 10     Every 4 hours  Until Goal Tube Feed Rate (mL per Hour): 40  Tube Feed Duration (in Hours): 24  Tube Feed Start Time: 17:49  Tube Feed Stop Time: 00:00  No Carb Prosource (1pkg = 15gms Protein)     Qty per Day:  1 (09-06-23 @ 14:01)    Pertinent Medications: MEDICATIONS  (STANDING):  albuterol/ipratropium for Nebulization 3 milliLiter(s) Nebulizer every 6 hours  atorvastatin 40 milliGRAM(s) Oral at bedtime  chlorhexidine 0.12% Liquid 15 milliLiter(s) Oral Mucosa every 12 hours  chlorhexidine 2% Cloths 1 Application(s) Topical daily  ciprofloxacin/hydrocortisone Suspension Otic 4 Drop(s) Left Ear two times a day  dextrose 5%. 1000 milliLiter(s) (50 mL/Hr) IV Continuous <Continuous>  dextrose 5%. 1000 milliLiter(s) (100 mL/Hr) IV Continuous <Continuous>  dextrose 50% Injectable 12.5 Gram(s) IV Push once  dextrose 50% Injectable 25 Gram(s) IV Push once  dextrose 50% Injectable 25 Gram(s) IV Push once  doxazosin 6 milliGRAM(s) Oral <User Schedule>  FIRST- Mouthwash  BLM 10 milliLiter(s) Swish and Spit four times a day  glucagon  Injectable 1 milliGRAM(s) IntraMuscular once  insulin lispro (ADMELOG) corrective regimen sliding scale   SubCutaneous every 6 hours  insulin NPH human recombinant 3 Unit(s) SubCutaneous every 6 hours  meropenem  IVPB 500 milliGRAM(s) IV Intermittent every 12 hours  pantoprazole  Injectable 40 milliGRAM(s) IV Push two times a day  sevelamer carbonate Powder 1600 milliGRAM(s) Oral three times a day  sodium chloride 3%  Inhalation 4 milliLiter(s) Inhalation every 6 hours    MEDICATIONS  (PRN):  acetaminophen   Oral Liquid .. 650 milliGRAM(s) Oral every 6 hours PRN Temp greater or equal to 38C (100.4F), Mild Pain (1 - 3)  dextrose Oral Gel 15 Gram(s) Oral once PRN Blood Glucose LESS THAN 70 milliGRAM(s)/deciliter    Pertinent Labs: 09-07 Na127 mmol/L<L> Glu 239 mg/dL<H> K+ 5.3 mmol/L Cr  3.29 mg/dL<H> BUN 96 mg/dL<H> 09-07 Phos 5.6 mg/dL<H> 09-05 Alb 2.3 g/dL<L>     CAPILLARY BLOOD GLUCOSE      POCT Blood Glucose.: 256 mg/dL (07 Sep 2023 11:42)  POCT Blood Glucose.: 248 mg/dL (07 Sep 2023 05:36)  POCT Blood Glucose.: 263 mg/dL (06 Sep 2023 23:49)  POCT Blood Glucose.: 216 mg/dL (06 Sep 2023 17:11)      Weight: Height (cm): 154.9 (08-11 @ 13:15), 152.4 (07-12 @ 11:29)  Weight (kg): 66.5 (08-11 @ 13:15), 59 (07-12 @ 11:29)  BMI (kg/m2): 27.7 (08-11 @ 13:15), 25.4 (07-12 @ 11:29)  Weight Assessment:      Physical Assessment, per flowsheets:  Edema:  Skin:      Estimated Needs:   [X] No change since previous assessment, based on dosing weight  lbs / kg   kcal daily @ kcal/kg,  gm protein daily @ gm/kg   [ ] recalculated, with consideration for, based on weight    Previous Nutrition Diagnosis:   [ ] Inadequate Energy Intake [ ]Inadequate Oral Intake [ ] Excessive Energy Intake   [ ] Underweight [ ] Increased Nutrient Needs [ ] Overweight/Obesity   [ ] Altered GI Function [ ] Unintended Weight Loss [ ] Food & Nutrition Related Knowledge Deficit [ ] Malnutrition   Nutrition Diagnosis is [ ] ongoing  [ ] resolved [ ] not applicable   New Nutrition Diagnosis: [ ] not applicable     Education:  [  ] Given today             Type of Education Provided:   [  ] Given on previous assessment by RD   [  ] Not applicable 2/2 cognitive deficit   [  ] Pt refusal of education offered   [  ] Not applicable 2/2 current prognosis  [  ] Not warranted at present    Interventions:   1)   2)  3)     Monitor & Evaluate:  PO intake, tolerance to diet/supplement, nutrition related lab values, weight trends, BMs/GI distress, hydration status, skin integrity.    Jacquelyn [place] MS[place] RD| [id number] (Also available on TEAMS) Pt seen for nutrition consult: Nutrition support team     Medical Course: Per chart 73 year old female DM, HTN, CKD, CHFpEF, breast Ca, respiratory and cardiac arrest (2018), CVA w residual weakness s/p trach/PEG which has since been removed. Presenting with respiratory distress requiring intubation. Pt with acute hypoxic respiratory failure with hypoxia and hypercapnia which is multifactorial including aspiration and acute on chronic diastolic CHF. Pt also RUSS requiring HD.    Nutrition Course: Unable to conduct nutrition interview 2/2 cognitive status, daughter at bedside to provide information. Pt NPO with NGT in place receiving all nutrition/hydration from TF of Nepro @40ml/hr x24hrs to provide 960ml total formula, 1728kcal (26kcal/kg), 77g pro (1.1 g/kg), 607ml free water from formula. Prosource 1x/day providing total of 92g pro to meet increased protein needs 2/2 HD and high risk for skin breakdown per wound note 9/6. Per daughter at bedside no reported intolerance to TF. Last BM 9/7 per RN flowsheets. Pt with increased needs 2/2 HD, last session today 9/7. Labs notable for hyperphosphatemia, receiving phosphate binder. POCT continues to be elevated.  Per GI note 9/6 family would like to further discuss decision on PEG placement and "would recommend optimization of respiratory failure and infection [?aspiration vs otitis externa] prior to proceeding with endoscopic PEG placement". GI consulted again to reevaluate per pulm note 9/7. Daughter with no nutrition related questions at bedside.     Diet Prescription: Diet, NPO with Tube Feed:   Tube Feeding Modality: Nasogastric  Nepro with Carb Steady (NEPRORTH)  Total Volume for 24 Hours (mL): 960  Continuous  Starting Tube Feed Rate {mL per Hour}: 10  Increase Tube Feed Rate by (mL): 10     Every 4 hours  Until Goal Tube Feed Rate (mL per Hour): 40  Tube Feed Duration (in Hours): 24  Tube Feed Start Time: 17:49  Tube Feed Stop Time: 00:00  No Carb Prosource (1pkg = 15gms Protein)     Qty per Day:  1 (09-06-23 @ 14:01)    Pertinent Medications: MEDICATIONS  (STANDING):  albuterol/ipratropium for Nebulization 3 milliLiter(s) Nebulizer every 6 hours  atorvastatin 40 milliGRAM(s) Oral at bedtime  chlorhexidine 0.12% Liquid 15 milliLiter(s) Oral Mucosa every 12 hours  chlorhexidine 2% Cloths 1 Application(s) Topical daily  ciprofloxacin/hydrocortisone Suspension Otic 4 Drop(s) Left Ear two times a day  dextrose 5%. 1000 milliLiter(s) (50 mL/Hr) IV Continuous <Continuous>  dextrose 5%. 1000 milliLiter(s) (100 mL/Hr) IV Continuous <Continuous>  dextrose 50% Injectable 12.5 Gram(s) IV Push once  dextrose 50% Injectable 25 Gram(s) IV Push once  dextrose 50% Injectable 25 Gram(s) IV Push once  doxazosin 6 milliGRAM(s) Oral <User Schedule>  FIRST- Mouthwash  BLM 10 milliLiter(s) Swish and Spit four times a day  glucagon  Injectable 1 milliGRAM(s) IntraMuscular once  insulin lispro (ADMELOG) corrective regimen sliding scale   SubCutaneous every 6 hours  insulin NPH human recombinant 3 Unit(s) SubCutaneous every 6 hours  meropenem  IVPB 500 milliGRAM(s) IV Intermittent every 12 hours  pantoprazole  Injectable 40 milliGRAM(s) IV Push two times a day  sevelamer carbonate Powder 1600 milliGRAM(s) Oral three times a day  sodium chloride 3%  Inhalation 4 milliLiter(s) Inhalation every 6 hours    MEDICATIONS  (PRN):  acetaminophen   Oral Liquid .. 650 milliGRAM(s) Oral every 6 hours PRN Temp greater or equal to 38C (100.4F), Mild Pain (1 - 3)  dextrose Oral Gel 15 Gram(s) Oral once PRN Blood Glucose LESS THAN 70 milliGRAM(s)/deciliter    Pertinent Labs: 09-07 Na127 mmol/L<L> Glu 239 mg/dL<H> K+ 5.3 mmol/L Cr  3.29 mg/dL<H> BUN 96 mg/dL<H> 09-07 Phos 5.6 mg/dL<H> 09-05 Alb 2.3 g/dL<L>     CAPILLARY BLOOD GLUCOSE  POCT Blood Glucose.: 256 mg/dL (07 Sep 2023 11:42)  POCT Blood Glucose.: 248 mg/dL (07 Sep 2023 05:36)  POCT Blood Glucose.: 263 mg/dL (06 Sep 2023 23:49)  POCT Blood Glucose.: 216 mg/dL (06 Sep 2023 17:11)      Weight: Height (cm): 154.9 (08-11 @ 13:15), 152.4 (07-12 @ 11:29)  Weight (kg): 66.5 (08-11 @ 13:15), 59 (07-12 @ 11:29)  BMI (kg/m2): 27.7 (08-11 @ 13:15), 25.4 (07-12 @ 11:29)  Weight Assessment: 9/2: 64.3kg, 8/26: 74kg, 8/12: 66.5kg. (weight changes likely due to fluctuating edema.)      Physical Assessment, per flowsheets:  Edema: No edema noted per RN flowsheets   Skin: Intact w/ no pressure injuries noted per RN flowsheets. Per wound note 9/6 healed stage 2 pressure injury right heel      Estimated Needs:   [X] No change since previous assessment based on +10% of IBW: 115lbs/ 52.5kg  25-30 kcals/kg = 3203-7497 kcals/d  1.5-1.8.g protein/kg = 80-95g protein/d      Previous Nutrition Diagnosis:  [x ] Excessive Energy Intake    Nutrition Diagnosis is [x] ongoing     Education:   [ x ] Not warranted at present    Interventions:   1) Recommend Nepro @35ml/hr x24hrs. Free water flush deferred to MD      At goal rate TF to provide 840mL total volume, 1512 kcals, 68g protein   2) Continue No Carb Prosource Qty per day: 1; (provides 15gm protein)   3) Obtain pre/post HD weights   4) RD available prn    Monitor & Evaluate:  Tolerance to EN, nutrition related lab values, weight trends, BMs/GI distress, hydration status, skin integrity.    Jacquelyn Joy[place] MS, RD| [id number] (Also available on TEAMS)

## 2023-09-07 NOTE — PROGRESS NOTE ADULT - PROBLEM SELECTOR PLAN 1
1. Follow up ID recs and continue Abx per ID  2. Follow up ear cx, (pending in lab)  3. Discussed with Dr. Evans and made him aware of exam findings.  4. ENt will continue to follow.

## 2023-09-07 NOTE — PROGRESS NOTE ADULT - SUBJECTIVE AND OBJECTIVE BOX
Patient is a 73y old  Female who presents with a chief complaint of Respiratory distress (07 Sep 2023 11:01)      SUBJECTIVE / OVERNIGHT EVENTS: starting chronic HD today, next session 9/9. ELft Ear OE, ENT following, ear wick replaced, on cipro drops. HDS on CArdura, renal Vanco dosing for MSSA BActeremia, on meropenem empirically through    MEDICATIONS  (STANDING):  albuterol/ipratropium for Nebulization 3 milliLiter(s) Nebulizer every 6 hours  atorvastatin 40 milliGRAM(s) Oral at bedtime  chlorhexidine 0.12% Liquid 15 milliLiter(s) Oral Mucosa every 12 hours  chlorhexidine 2% Cloths 1 Application(s) Topical daily  ciprofloxacin/hydrocortisone Suspension Otic 4 Drop(s) Left Ear two times a day  dextrose 5%. 1000 milliLiter(s) (50 mL/Hr) IV Continuous <Continuous>  dextrose 5%. 1000 milliLiter(s) (100 mL/Hr) IV Continuous <Continuous>  dextrose 50% Injectable 12.5 Gram(s) IV Push once  dextrose 50% Injectable 25 Gram(s) IV Push once  dextrose 50% Injectable 25 Gram(s) IV Push once  doxazosin 6 milliGRAM(s) Oral <User Schedule>  FIRST- Mouthwash  BLM 10 milliLiter(s) Swish and Spit four times a day  glucagon  Injectable 1 milliGRAM(s) IntraMuscular once  insulin lispro (ADMELOG) corrective regimen sliding scale   SubCutaneous every 6 hours  insulin NPH human recombinant 3 Unit(s) SubCutaneous every 6 hours  meropenem  IVPB 500 milliGRAM(s) IV Intermittent every 12 hours  pantoprazole  Injectable 40 milliGRAM(s) IV Push two times a day  sevelamer carbonate Powder 1600 milliGRAM(s) Oral three times a day  sodium chloride 3%  Inhalation 4 milliLiter(s) Inhalation every 6 hours    MEDICATIONS  (PRN):  acetaminophen   Oral Liquid .. 650 milliGRAM(s) Oral every 6 hours PRN Temp greater or equal to 38C (100.4F), Mild Pain (1 - 3)  dextrose Oral Gel 15 Gram(s) Oral once PRN Blood Glucose LESS THAN 70 milliGRAM(s)/deciliter      Vital Signs Last 24 Hrs  T(F): 98.1 (09-07-23 @ 17:31), Max: 98.8 (09-06-23 @ 21:20)  HR: 97 (09-07-23 @ 17:31) (86 - 99)  BP: 113/42 (09-07-23 @ 17:31) (112/51 - 144/91)  RR: 12 (09-07-23 @ 17:31) (12 - 14)  SpO2: 100% (09-07-23 @ 17:31) (97% - 100%)  Telemetry:   CAPILLARY BLOOD GLUCOSE      POCT Blood Glucose.: 256 mg/dL (07 Sep 2023 17:37)  POCT Blood Glucose.: 256 mg/dL (07 Sep 2023 11:42)  POCT Blood Glucose.: 248 mg/dL (07 Sep 2023 05:36)  POCT Blood Glucose.: 263 mg/dL (06 Sep 2023 23:49)    I&O's Summary    06 Sep 2023 07:01  -  07 Sep 2023 07:00  --------------------------------------------------------  IN: 510 mL / OUT: 0 mL / NET: 510 mL        PHYSICAL EXAM:  GENERAL: NAD, well-developed  HEAD:  Atraumatic, Normocephalic  EYES: EOMI, PERRLA, conjunctiva and sclera clear  NECK: Supple, No JVD  CHEST/LUNG: Clear to auscultation bilaterally; No wheeze  HEART: Regular rate and rhythm; No murmurs, rubs, or gallops  ABDOMEN: Soft, Nontender, Nondistended; Bowel sounds present  EXTREMITIES:  2+ Peripheral Pulses, No clubbing, cyanosis, or edema  PSYCH: AAOx3  NEUROLOGY: non-focal  SKIN: No rashes or lesions    LABS:                        7.5    11.68 )-----------( 184      ( 07 Sep 2023 10:23 )             24.0     09-07    127<L>  |  91<L>  |  96<H>  ----------------------------<  239<H>  5.3   |  20<L>  |  3.29<H>    Ca    8.5      07 Sep 2023 06:00  Phos  5.6     09-07  Mg     2.50     09-07            Urinalysis Basic - ( 07 Sep 2023 06:00 )    Color: x / Appearance: x / SG: x / pH: x  Gluc: 239 mg/dL / Ketone: x  / Bili: x / Urobili: x   Blood: x / Protein: x / Nitrite: x   Leuk Esterase: x / RBC: x / WBC x   Sq Epi: x / Non Sq Epi: x / Bacteria: x        RADIOLOGY & ADDITIONAL TESTS:    Imaging Personally Reviewed:    Consultant(s) Notes Reviewed:      Care Discussed with Consultants/Other Providers:

## 2023-09-07 NOTE — PROGRESS NOTE ADULT - ASSESSMENT
74 y/o F DM on insulin, HTN, CKD, CHFpEF, breast CA, respiratory arrest and cardiac arrest (2018), CVA with residual weakness, aspiration PNA h/o trache, PEG, both removed presents with respiratory distress requiring intubation. May be volume overload i/s/o CHF/CKD vs decrease respiratory drive (given oxygen at home). Improving mental status however worsening renal function and anuria, concerning for ATN vs AIN. Course c/b an episode of coffee ground emesis and melena, for which GI was consulted and did EGD, colonoscopy, which found erosive gastropathy, and recommended to start on PPI BID. Also found to have new fevers likely VAP and MSSA bacteremia. Prolonged Vent time and trach'd. 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     Cardiac   Labile blood pressure - ranging between SBP 80s-200s on 8/31  - Labetalol 600 TID - overnight became hypotensive/bradycardic ? iso BB toxicity. Labetalol is renally cleared, consider restarting carvedilol if becomes hypertensive  - Clonidine increased to 0.3 TID - held as on pressors  - Home Cardura - held as on pressors  - Per discussion, order of adding BP meds: 1) Cardura 2) carvedilol, 3) clonidine  - Portable TTE pending i/s/o  MSSA bacteremia - Echo unable to eval R/O  for endocarditis     #CHFpEF   - TTE 7/2023 with EF 59%, with severe LVH and diastolic dysfunction   - pro-BNP > 74943, trop 78   - Repeat TTE with EF 60% normal systolic and grade 1 diastolic dysfunction     Pulmonary   #Acute hypercapnic and hypoxemic respiratory failure   - DDx: aspiration vs volume overload vs sedating medication decreasing drive (was given Nyquil)   - s/p trach  - Pt w/ copious thick secretions and c/w airway clearance (NS3%, and Duoneb)    /Renal   #RUSS on CKD  - Ddx: obstructive (willard in, no hydro on POCUS) vs low flow state vs AIN i/s/o cephalosporin use and drug rash   - kidney biopsy to r/o AIN planned 8/22, however family felt procedure too risky and so deferred  - cont empiric prednisone 40mg started 8/18   - Continue to monitor, dose meds per eGFR. avoid nephrotoxic agents  - No HD today  - Strict IOs per nephro; Willard removed with positive UA  - S/p prednisone 40 for two week course ending 9/1, will taper 10 per day per nephro  - c/w Prednisone taper (starting 9/3) - c/w 30 mg qd    # Hyperphosphatemia  - Continue to monitor BMP q12  - Started on Renvela powder TID per nephro recs    GI  - Had one episode of coffee ground emesis 8/24, two episodes melena 8/31  - GI consulted, scope showed erosive gastropathy  - Continue PPI BID for 8 weeks  - Continue to monitor hemoglobin  - GI consulted again regarding PEG placement, follow up recs  - On orogastric tube feeds currently   - Monitor for bowel movements    Endocrine  #T2DM   - ISS  - Increased NPH to 3units pre meals  - Will continue to monitor BG and adjust to keep -180    ID   Started on empiric vancomycin and aztreonam (cefepime/zosyn allergy? developed rash req prednisone)   Trialed cefepime --> cefazolin (sputum MSSA), developed drug eruption - discontinued   UA +, Willard DC'd  Per ID pharmacist, started on meropenem  - ID following and recommended to continue Bradford and Vanc Post HD if on HD or by level if not on HD.  - Vanc Random level on 9/5: 20.1  - L ear otitis externa- ENT following - recommend Ciprodex to wick BID for now  - f/u with Dental regarding concern for L lower mandibular area- oral lesions ? varicella zoster   - Magic mouth wash (First)     Heme/Onc  ppx: heparin q8 hrs - held as hemoglobin downtrending, asa held for the same  - Continue to monitor CBC  - SCDs ordered    Ethics  GOC - Per previous GOC with daughter and , reported that they have not talked about end of life care with the patient. For now, they wanted "everything to be done" including CPR, continuing with mech ventilation/intubation, pressors   74 y/o F DM on insulin, HTN, CKD, CHFpEF, breast CA, respiratory arrest and cardiac arrest (2018), CVA with residual weakness, aspiration PNA h/o trache, PEG, both removed presents with respiratory distress requiring intubation. May be volume overload i/s/o CHF/CKD vs decrease respiratory drive (given oxygen at home). Improving mental status however worsening renal function and anuria, concerning for ATN vs AIN. Course c/b an episode of coffee ground emesis and melena, for which GI was consulted and did EGD, colonoscopy, which found erosive gastropathy, and recommended to start on PPI BID. Also found to have new fevers likely VAP and MSSA bacteremia. Prolonged Vent time and trach'd. 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     Cardiac   Labile blood pressure - ranging between SBP 80s-200s on 8/31  - Labetalol 600 TID - overnight became hypotensive/bradycardic ? iso BB toxicity. Labetalol is renally cleared, consider restarting carvedilol if becomes hypertensive  - Clonidine increased to 0.3 TID - held as on pressors  - Home Cardura - held as on pressors  - Per discussion, order of adding BP meds: 1) Cardura 2) carvedilol, 3) clonidine  - Portable TTE pending i/s/o  MSSA bacteremia - Echo unable to eval R/O  for endocarditis     #CHFpEF   - TTE 7/2023 with EF 59%, with severe LVH and diastolic dysfunction   - pro-BNP > 00819, trop 78   - Repeat TTE with EF 60% normal systolic and grade 1 diastolic dysfunction     Pulmonary   #Acute hypercapnic and hypoxemic respiratory failure   - DDx: aspiration vs volume overload vs sedating medication decreasing drive (was given Nyquil)   - s/p trach  - Pt w/ copious thick secretions and c/w airway clearance (NS3%, and Duoneb)    /Renal   #RUSS on CKD  - Ddx: obstructive (willard in, no hydro on POCUS) vs low flow state vs AIN i/s/o cephalosporin use and drug rash   - kidney biopsy to r/o AIN planned 8/22, however family felt procedure too risky and so deferred  - cont empiric prednisone 40mg started 8/18   - Continue to monitor, dose meds per eGFR. avoid nephrotoxic agents  - No HD today  - Strict IOs per nephro; Willard removed with positive UA  - S/p prednisone 40 for two week course ending 9/1, will taper 10 per day per nephro  - c/w Prednisone taper (starting 9/3) - c/w 30 mg qd, decreased to 20mg now   =- Pt to begin HD today , fu with IR regarding permacath     # Hyperphosphatemia  - Continue to monitor BMP q12  - Started on Renvela powder TID per nephro recs    GI  - Had one episode of coffee ground emesis 8/24, two episodes melena 8/31  - GI consulted, scope showed erosive gastropathy  - Continue PPI BID for 8 weeks  - Continue to monitor hemoglobin  - GI consulted again regarding PEG placement, follow up recs  - On orogastric tube feeds currently   - Monitor for bowel movements  - Family agreeable to PEG this am but holding off per  - risks of NGT reviewed still wishes to hold off     Endocrine  #T2DM   - ISS  - Increased NPH to 3units pre meals  - Will continue to monitor BG and adjust to keep -180    ID   Started on empiric vancomycin and aztreonam (cefepime/zosyn allergy? developed rash req prednisone)   Trialed cefepime --> cefazolin (sputum MSSA), developed drug eruption - discontinued   UA +, Willard DC'd  Per ID pharmacist, started on meropenem  - ID following and recommended to continue Bradford and Vanc Post HD if on HD or by level if not on HD.  - Vanc Random level on 9/5: 20.1  - L ear otitis externa- ENT following - recommend Ciprodex to wick BID for now  - f/u with Dental regarding concern for L lower mandibular area- oral lesions ? varicella zoster   - Magic mouth wash (First)     Heme/Onc  ppx: heparin q8 hrs - held as hemoglobin downtrending, asa held for the same  - Continue to monitor CBC  - SCDs ordered    Ethics  GOC - Per previous GOC with daughter and , reported that they have not talked about end of life care with the patient. For now, they wanted "everything to be done" including CPR, continuing with mech ventilation/intubation, pressors

## 2023-09-07 NOTE — PROGRESS NOTE ADULT - SUBJECTIVE AND OBJECTIVE BOX
CHIEF COMPLAINT: Patient is a 73y old  Female who presents with a chief complaint of Respiratory distress (07 Sep 2023 07:43)      Interval Events:    REVIEW OF SYSTEMS:  [ ] All other systems negative  [ ] Unable to assess ROS because ________    Mode: AC/ CMV (Assist Control/ Continuous Mandatory Ventilation), RR (machine): 12, TV (machine): 360, FiO2: 40, PEEP: 5, ITime: 0.86, MAP: 12, PIP: 38      OBJECTIVE:  ICU Vital Signs Last 24 Hrs  T(C): 36.6 (07 Sep 2023 05:53), Max: 37.1 (06 Sep 2023 11:13)  T(F): 97.9 (07 Sep 2023 05:53), Max: 98.8 (06 Sep 2023 11:13)  HR: 97 (07 Sep 2023 07:25) (78 - 919)  BP: 132/53 (07 Sep 2023 05:53) (118/47 - 137/58)  BP(mean): 74 (07 Sep 2023 05:53) (65 - 79)  ABP: --  ABP(mean): --  RR: 13 (07 Sep 2023 05:53) (12 - 14)  SpO2: 100% (07 Sep 2023 07:25) (98% - 100%)    O2 Parameters below as of 07 Sep 2023 07:42  Patient On (Oxygen Delivery Method): ventilator, AC          Mode: AC/ CMV (Assist Control/ Continuous Mandatory Ventilation), RR (machine): 12, TV (machine): 360, FiO2: 40, PEEP: 5, ITime: 0.86, MAP: 12, PIP: 38    09-06 @ 07:01  -  09-07 @ 07:00  --------------------------------------------------------  IN: 510 mL / OUT: 0 mL / NET: 510 mL      CAPILLARY BLOOD GLUCOSE      POCT Blood Glucose.: 248 mg/dL (07 Sep 2023 05:36)      HOSPITAL MEDICATIONS:  MEDICATIONS  (STANDING):  albuterol/ipratropium for Nebulization 3 milliLiter(s) Nebulizer every 6 hours  atorvastatin 40 milliGRAM(s) Oral at bedtime  chlorhexidine 0.12% Liquid 15 milliLiter(s) Oral Mucosa every 12 hours  chlorhexidine 2% Cloths 1 Application(s) Topical daily  ciprofloxacin/hydrocortisone Suspension Otic 4 Drop(s) Left Ear two times a day  dextrose 5%. 1000 milliLiter(s) (50 mL/Hr) IV Continuous <Continuous>  dextrose 5%. 1000 milliLiter(s) (100 mL/Hr) IV Continuous <Continuous>  dextrose 50% Injectable 12.5 Gram(s) IV Push once  dextrose 50% Injectable 25 Gram(s) IV Push once  dextrose 50% Injectable 25 Gram(s) IV Push once  doxazosin 6 milliGRAM(s) Oral <User Schedule>  FIRST- Mouthwash  BLM 10 milliLiter(s) Swish and Spit four times a day  glucagon  Injectable 1 milliGRAM(s) IntraMuscular once  insulin lispro (ADMELOG) corrective regimen sliding scale   SubCutaneous every 6 hours  insulin NPH human recombinant 3 Unit(s) SubCutaneous every 6 hours  meropenem  IVPB 500 milliGRAM(s) IV Intermittent every 12 hours  pantoprazole  Injectable 40 milliGRAM(s) IV Push two times a day  predniSONE   Tablet 30 milliGRAM(s) Oral daily  sevelamer carbonate Powder 1600 milliGRAM(s) Oral three times a day  sodium chloride 3%  Inhalation 4 milliLiter(s) Inhalation every 6 hours    MEDICATIONS  (PRN):  acetaminophen   Oral Liquid .. 650 milliGRAM(s) Oral every 6 hours PRN Temp greater or equal to 38C (100.4F), Mild Pain (1 - 3)  dextrose Oral Gel 15 Gram(s) Oral once PRN Blood Glucose LESS THAN 70 milliGRAM(s)/deciliter      LABS:                        6.8    10.15 )-----------( 181      ( 07 Sep 2023 06:00 )             21.5     09-07    127<L>  |  91<L>  |  96<H>  ----------------------------<  239<H>  5.3   |  20<L>  |  3.29<H>    Ca    8.5      07 Sep 2023 06:00  Phos  5.6     09-07  Mg     2.50     09-07        Urinalysis Basic - ( 07 Sep 2023 06:00 )    Color: x / Appearance: x / SG: x / pH: x  Gluc: 239 mg/dL / Ketone: x  / Bili: x / Urobili: x   Blood: x / Protein: x / Nitrite: x   Leuk Esterase: x / RBC: x / WBC x   Sq Epi: x / Non Sq Epi: x / Bacteria: x            PAST MEDICAL & SURGICAL HISTORY:  Breast CA      Diabetes      Stroke      Cardiac arrest      HTN (hypertension)      H/O mastectomy, bilateral          FAMILY HISTORY:  No pertinent family history in first degree relatives        Social History:      RADIOLOGY:  [ ] Reviewed and interpreted by me    PULMONARY FUNCTION TESTS:    EKG: CHIEF COMPLAINT: Patient is a 73y old  Female who presents with a chief complaint of Respiratory distress (07 Sep 2023 07:43)      Interval Events: Seen at bedside with family in room Pt to begin HD today     REVIEW OF SYSTEMS:  [ x] Unable to assess ROS because trach    Mode: AC/ CMV (Assist Control/ Continuous Mandatory Ventilation), RR (machine): 12, TV (machine): 360, FiO2: 40, PEEP: 5, ITime: 0.86, MAP: 12, PIP: 38      OBJECTIVE:  ICU Vital Signs Last 24 Hrs  T(C): 36.6 (07 Sep 2023 05:53), Max: 37.1 (06 Sep 2023 11:13)  T(F): 97.9 (07 Sep 2023 05:53), Max: 98.8 (06 Sep 2023 11:13)  HR: 97 (07 Sep 2023 07:25) (78 - 919)  BP: 132/53 (07 Sep 2023 05:53) (118/47 - 137/58)  BP(mean): 74 (07 Sep 2023 05:53) (65 - 79)  ABP: --  ABP(mean): --  RR: 13 (07 Sep 2023 05:53) (12 - 14)  SpO2: 100% (07 Sep 2023 07:25) (98% - 100%)    O2 Parameters below as of 07 Sep 2023 07:42  Patient On (Oxygen Delivery Method): ventilator, AC          Mode: AC/ CMV (Assist Control/ Continuous Mandatory Ventilation), RR (machine): 12, TV (machine): 360, FiO2: 40, PEEP: 5, ITime: 0.86, MAP: 12, PIP: 38    09-06 @ 07:01  -  09-07 @ 07:00  --------------------------------------------------------  IN: 510 mL / OUT: 0 mL / NET: 510 mL      CAPILLARY BLOOD GLUCOSE      POCT Blood Glucose.: 248 mg/dL (07 Sep 2023 05:36)      HOSPITAL MEDICATIONS:  MEDICATIONS  (STANDING):  albuterol/ipratropium for Nebulization 3 milliLiter(s) Nebulizer every 6 hours  atorvastatin 40 milliGRAM(s) Oral at bedtime  chlorhexidine 0.12% Liquid 15 milliLiter(s) Oral Mucosa every 12 hours  chlorhexidine 2% Cloths 1 Application(s) Topical daily  ciprofloxacin/hydrocortisone Suspension Otic 4 Drop(s) Left Ear two times a day  dextrose 5%. 1000 milliLiter(s) (50 mL/Hr) IV Continuous <Continuous>  dextrose 5%. 1000 milliLiter(s) (100 mL/Hr) IV Continuous <Continuous>  dextrose 50% Injectable 12.5 Gram(s) IV Push once  dextrose 50% Injectable 25 Gram(s) IV Push once  dextrose 50% Injectable 25 Gram(s) IV Push once  doxazosin 6 milliGRAM(s) Oral <User Schedule>  FIRST- Mouthwash  BLM 10 milliLiter(s) Swish and Spit four times a day  glucagon  Injectable 1 milliGRAM(s) IntraMuscular once  insulin lispro (ADMELOG) corrective regimen sliding scale   SubCutaneous every 6 hours  insulin NPH human recombinant 3 Unit(s) SubCutaneous every 6 hours  meropenem  IVPB 500 milliGRAM(s) IV Intermittent every 12 hours  pantoprazole  Injectable 40 milliGRAM(s) IV Push two times a day  predniSONE   Tablet 30 milliGRAM(s) Oral daily  sevelamer carbonate Powder 1600 milliGRAM(s) Oral three times a day  sodium chloride 3%  Inhalation 4 milliLiter(s) Inhalation every 6 hours    MEDICATIONS  (PRN):  acetaminophen   Oral Liquid .. 650 milliGRAM(s) Oral every 6 hours PRN Temp greater or equal to 38C (100.4F), Mild Pain (1 - 3)  dextrose Oral Gel 15 Gram(s) Oral once PRN Blood Glucose LESS THAN 70 milliGRAM(s)/deciliter      LABS:                        6.8    10.15 )-----------( 181      ( 07 Sep 2023 06:00 )             21.5     09-07    127<L>  |  91<L>  |  96<H>  ----------------------------<  239<H>  5.3   |  20<L>  |  3.29<H>    Ca    8.5      07 Sep 2023 06:00  Phos  5.6     09-07  Mg     2.50     09-07        Urinalysis Basic - ( 07 Sep 2023 06:00 )    Color: x / Appearance: x / SG: x / pH: x  Gluc: 239 mg/dL / Ketone: x  / Bili: x / Urobili: x   Blood: x / Protein: x / Nitrite: x   Leuk Esterase: x / RBC: x / WBC x   Sq Epi: x / Non Sq Epi: x / Bacteria: x            PAST MEDICAL & SURGICAL HISTORY:  Breast CA      Diabetes      Stroke      Cardiac arrest      HTN (hypertension)      H/O mastectomy, bilateral          FAMILY HISTORY:  No pertinent family history in first degree relatives        Social History:      RADIOLOGY:  [ ] Reviewed and interpreted by me    PULMONARY FUNCTION TESTS:    EKG:

## 2023-09-07 NOTE — PROGRESS NOTE ADULT - ASSESSMENT
72 y/o F well known to me from my housecal outptn practice. she was admitted at [facility] 7/12-7/22 w aspiration PNA, was treated w CEFEPIME, developed an allergic rash,  dCHF, + MAC on AFB culture, had been progressively getting more and more lethargic and dyspneic at home since DC.   In  am of 8/11/23  ptn presented with respiratory distress w hypoxia and hypercarbia requiring intubation 2/2 volume overload +/- Asp PNA      Neuro   responds appropriately   Baseline MS AOx3, aphasic   - h/o CVA , on aspirin and statin . 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 Mcnulty, now in RCU  s/p septic shock overnight 9/1, now on meropenem, HDS  s/p trache, on vent support, copious secretions      Renal   Hyperkalemia with EKG changes  , initially treated w LOKELMA,  now resolved   Ptn well know to Dr. Colon, consult reviewed    HD 8/19,  8/21, 8/26 and 8/28.  s/p PUF 8/29 2.5 Liters, HD 8/30,  9/1, 9/4  starting chronic HD today, next session 9/9.      GI  NPO  on tube feeds  coffee ground emesis x 1 8/24, melena overnight 8/31, s/p 1UPRBC, EGD/Colonoscopy: erosive gastropathy, esophagisits, on colonoscopy old blood seen no active bleeding, poor prep  family to decide re PEG placement    Endocrine  T2DM   A1C 7.1 in 7/2023   - BG goal 140-200     ID   No fever/leukocytosis, recheck temp   Hx latent TB which was treated, no concern for TB   - grew MAC on AFB culture from most recent admission. at [facility]  -MSSA Bacteremia 9/1, allergic to cephalosprins and PCN, on Vanco as per ID, renal dosing,   - sputum also grew E.Coli-ESBL, as per ID recs for  [place] through 9/7( 1 week course as per ID) , afebrile. consider DC      Heme/Onc  ppx: heparin q8 hrs     Ethics  GOC - Discussed GOC with daughter and , they have opted in the past for full code. and she remains full code at present

## 2023-09-07 NOTE — PROGRESS NOTE ADULT - SUBJECTIVE AND OBJECTIVE BOX
Subjective: Patient seen and examined. No new events except as noted.     REVIEW OF SYSTEMS:    Unable to obtain     MEDICATIONS:  MEDICATIONS  (STANDING):  albuterol/ipratropium for Nebulization 3 milliLiter(s) Nebulizer every 6 hours  atorvastatin 40 milliGRAM(s) Oral at bedtime  chlorhexidine 0.12% Liquid 15 milliLiter(s) Oral Mucosa every 12 hours  chlorhexidine 2% Cloths 1 Application(s) Topical daily  ciprofloxacin/hydrocortisone Suspension Otic 4 Drop(s) Left Ear two times a day  dextrose 5%. 1000 milliLiter(s) (50 mL/Hr) IV Continuous <Continuous>  dextrose 5%. 1000 milliLiter(s) (100 mL/Hr) IV Continuous <Continuous>  dextrose 50% Injectable 12.5 Gram(s) IV Push once  dextrose 50% Injectable 25 Gram(s) IV Push once  dextrose 50% Injectable 25 Gram(s) IV Push once  doxazosin 6 milliGRAM(s) Oral <User Schedule>  FIRST- Mouthwash  BLM 10 milliLiter(s) Swish and Spit four times a day  glucagon  Injectable 1 milliGRAM(s) IntraMuscular once  insulin lispro (ADMELOG) corrective regimen sliding scale   SubCutaneous every 6 hours  insulin NPH human recombinant 3 Unit(s) SubCutaneous every 6 hours  meropenem  IVPB 500 milliGRAM(s) IV Intermittent every 12 hours  pantoprazole  Injectable 40 milliGRAM(s) IV Push two times a day  sevelamer carbonate Powder 1600 milliGRAM(s) Oral three times a day  sodium chloride 3%  Inhalation 4 milliLiter(s) Inhalation every 6 hours      PHYSICAL EXAM:  T(C): 36.6 (09-07-23 @ 05:53), Max: 37.1 (09-06-23 @ 11:13)  HR: 97 (09-07-23 @ 07:25) (85 - 919)  BP: 132/53 (09-07-23 @ 05:53) (118/47 - 137/58)  RR: 13 (09-07-23 @ 05:53) (12 - 14)  SpO2: 100% (09-07-23 @ 07:25) (98% - 100%)  Wt(kg): --  I&O's Summary    06 Sep 2023 07:01  -  07 Sep 2023 07:00  --------------------------------------------------------  IN: 510 mL / OUT: 0 mL / NET: 510 mL          Appearance: NAD, +trach  HEENT: dry oral mucosa  Lymphatic: No lymphadenopathy  Cardiovascular: Normal S1 S2, No JVD, No murmurs, No edema  Respiratory: Decreased BS, + trach to vent	  Neuro: opens eyes  Gastrointestinal: Soft, Non-tender, + BS, + OGT	  Skin: No rashes, No ecchymoses, No cyanosis	  Extremities: No strength/ROM 2/2 sedation, + BL LE edema  Vascular: Peripheral pulses palpable 2+ bilaterally      LABS:    CARDIAC MARKERS:                                6.8    10.15 )-----------( 181      ( 07 Sep 2023 06:00 )             21.5     09-07    127<L>  |  91<L>  |  96<H>  ----------------------------<  239<H>  5.3   |  20<L>  |  3.29<H>    Ca    8.5      07 Sep 2023 06:00  Phos  5.6     09-07  Mg     2.50     09-07      proBNP:   Lipid Profile:   HgA1c:   TSH:             TELEMETRY: 	    ECG:  	  RADIOLOGY:   DIAGNOSTIC TESTING:  [ ] Echocardiogram:  [ ]  Catheterization:  [ ] Stress Test:    OTHER:

## 2023-09-07 NOTE — PROGRESS NOTE ADULT - SUBJECTIVE AND OBJECTIVE BOX
Overnight events noted      VITAL:  T(C): , Max: 37.1 (09-06-23 @ 11:13)  T(F): , Max: 98.8 (09-06-23 @ 11:13)  HR: 97 (09-07-23 @ 07:25)  BP: 132/53 (09-07-23 @ 05:53)  BP(mean): 74 (09-07-23 @ 05:53)  RR: 13 (09-07-23 @ 05:53)  SpO2: 100% (09-07-23 @ 07:25)  Wt(kg): --      PHYSICAL EXAM:  Constitutional: lethargic, nad  HEENT: (+)NGT  Neck: (+)trach-vent  Respiratory: coarse BS b/l  Cardiovascular: reg s1s2  Gastrointestinal: BS+, soft, NT/ND  Extremities: + peripheral edema  Neurological: tone WNL  : no Wheeler  Skin: No rashes  Access: [facility] temp HD catheter (8/19)      LABS:                        6.8    10.15 )-----------( 181      ( 07 Sep 2023 06:00 )             21.5     Na(127)/K(5.3)/Cl(91)/HCO3(20)/BUN(96)/Cr(3.29)Glu(239)/Ca(8.5)/Mg(2.50)/PO4(5.6)    09-07 @ 06:00  Na(130)/K(5.1)/Cl(94)/HCO3(22)/BUN(82)/Cr(3.09)Glu(181)/Ca(8.3)/Mg(2.40)/PO4(5.0)    09-06 @ 06:10  Na(132)/K(4.8)/Cl(97)/HCO3(25)/BUN(71)/Cr(2.87)Glu(244)/Ca(8.2)/Mg(2.20)/PO4(4.8)    09-05 @ 06:00  Na(132)/K(4.9)/Cl(98)/HCO3(23)/BUN(63)/Cr(2.71)Glu(255)/Ca(8.3)/Mg(2.20)/PO4(4.8)    09-04 @ 17:35    Vanco 17.6      IMPRESSION: 73F w/ HTN, DM2, CVA, breast CA-bilateral mastectomy, recurrent aspiration pneumonia/respiratory failure, and CKD, 8/11/23 p/w acute hypercapnic respiratory failure; c/b RUSS    (1)CKD - stage 4     (2)RUSS - ATN vs AIN -BUN and creatinine not only worsening as of today, but we are now seeing several worsening electrolyte derangements, (hyponatremia, hyperkalemia, hyperphosphatemia). Need for further HD here appears inevitable. I would restart HD today and plan for discharge on HD.    (3)Hyponatremia - worsening as of today     (4)Hyperkalemia - worsening slowly, despite being on Nepro    (5)Pulm- hypercapnic respiratory failure on admission - now s/p trach; remains on vent     (6)CV - normotensive     (7)ID - on IV Meropenem/Vanco    RECOMMEND:  (1)Reinitiation of HD starting today, then HD on Saturday 9/9  (2)Prednisone reduction to 20mg po qd today  (3)Plan for tunneled cath placement once   (3)Dose new meds for GFR <10/HD          Jimmy Colon MD  [facility]  Office/on call physician: [phone]  Cell (7a-7p): [phone]       family at bedside  (+)improved urine output per family  no pain      VITAL:  T(C): , Max: 37.1 (09-06-23 @ 11:13)  T(F): , Max: 98.8 (09-06-23 @ 11:13)  HR: 97 (09-07-23 @ 07:25)  BP: 132/53 (09-07-23 @ 05:53)  BP(mean): 74 (09-07-23 @ 05:53)  RR: 13 (09-07-23 @ 05:53)  SpO2: 100% (09-07-23 @ 07:25)  Wt(kg): --      PHYSICAL EXAM:  Constitutional: lethargic, nad, following simple commands  HEENT: (+)NGT  Neck: (+)trach-vent  Respiratory: coarse BS b/l  Cardiovascular: reg s1s2  Gastrointestinal: BS+, soft, NT/ND  Extremities: + peripheral edema  Neurological: tone WNL  : no Wheeler  Skin: No rashes  Access: [facility] temp HD catheter (8/19)      LABS:                        6.8    10.15 )-----------( 181      ( 07 Sep 2023 06:00 )             21.5     Na(127)/K(5.3)/Cl(91)/HCO3(20)/BUN(96)/Cr(3.29)Glu(239)/Ca(8.5)/Mg(2.50)/PO4(5.6)    09-07 @ 06:00  Na(130)/K(5.1)/Cl(94)/HCO3(22)/BUN(82)/Cr(3.09)Glu(181)/Ca(8.3)/Mg(2.40)/PO4(5.0)    09-06 @ 06:10  Na(132)/K(4.8)/Cl(97)/HCO3(25)/BUN(71)/Cr(2.87)Glu(244)/Ca(8.2)/Mg(2.20)/PO4(4.8)    09-05 @ 06:00  Na(132)/K(4.9)/Cl(98)/HCO3(23)/BUN(63)/Cr(2.71)Glu(255)/Ca(8.3)/Mg(2.20)/PO4(4.8)    09-04 @ 17:35    Vanco 17.6      IMPRESSION: 73F w/ HTN, DM2, CVA, breast CA-bilateral mastectomy, recurrent aspiration pneumonia/respiratory failure, and CKD, 8/11/23 p/w acute hypercapnic respiratory failure; c/b RUSS    (1)CKD - stage 4     (2)RUSS - ATN vs AIN -BUN and creatinine not only worsening as of today, but we are now seeing several worsening electrolyte derangements, (hyponatremia, hyperkalemia, hyperphosphatemia). Need for further HD here appears inevitable. I would restart HD today and plan for discharge on HD.    (3)Hyponatremia - worsening as of today     (4)Hyperkalemia - worsening slowly, despite being on Nepro    (5)Pulm- hypercapnic respiratory failure on admission - now s/p trach; remains on vent     (6)CV - normotensive     (7)ID - on IV Meropenem/Vanco    RECOMMEND:  (1)Reinitiation of HD starting today, then HD on Saturday 9/9  (2)Prednisone reduction to 20mg po qd today  (3)Plan for tunneled cath placement once   (3)Dose new meds for GFR <10/HD          Jimmy Colon MD  [facility]  Office/on call physician: [phone]  Cell (7a-7p): [phone]       family at bedside  (+)improved urine output per family  no pain      VITAL:  T(C): , Max: 37.1 (09-06-23 @ 11:13)  T(F): , Max: 98.8 (09-06-23 @ 11:13)  HR: 97 (09-07-23 @ 07:25)  BP: 132/53 (09-07-23 @ 05:53)  BP(mean): 74 (09-07-23 @ 05:53)  RR: 13 (09-07-23 @ 05:53)  SpO2: 100% (09-07-23 @ 07:25)  Wt(kg): --      PHYSICAL EXAM:  Constitutional: lethargic, nad, following simple commands  HEENT: (+)NGT  Neck: (+)trach-vent  Respiratory: coarse BS b/l  Cardiovascular: reg s1s2  Gastrointestinal: BS+, soft, NT/ND  Extremities: + peripheral edema  Neurological: tone WNL  : no Wheeler  Skin: No rashes  Access: [facility] temp HD catheter (8/19)      LABS:                        6.8    10.15 )-----------( 181      ( 07 Sep 2023 06:00 )             21.5     Na(127)/K(5.3)/Cl(91)/HCO3(20)/BUN(96)/Cr(3.29)Glu(239)/Ca(8.5)/Mg(2.50)/PO4(5.6)    09-07 @ 06:00  Na(130)/K(5.1)/Cl(94)/HCO3(22)/BUN(82)/Cr(3.09)Glu(181)/Ca(8.3)/Mg(2.40)/PO4(5.0)    09-06 @ 06:10  Na(132)/K(4.8)/Cl(97)/HCO3(25)/BUN(71)/Cr(2.87)Glu(244)/Ca(8.2)/Mg(2.20)/PO4(4.8)    09-05 @ 06:00  Na(132)/K(4.9)/Cl(98)/HCO3(23)/BUN(63)/Cr(2.71)Glu(255)/Ca(8.3)/Mg(2.20)/PO4(4.8)    09-04 @ 17:35    Vanco 17.6      IMPRESSION: 73F w/ HTN, DM2, CVA, breast CA-bilateral mastectomy, recurrent aspiration pneumonia/respiratory failure, and CKD, 8/11/23 p/w acute hypercapnic respiratory failure; c/b RUSS    (1)CKD - stage 4     (2)RUSS - ATN vs AIN -BUN and creatinine not only worsening as of today, but we are now seeing several worsening electrolyte derangements, (hyponatremia, hyperkalemia, hyperphosphatemia). Need for further HD here appears inevitable. I would restart HD today     (3)Hyponatremia - worsening as of today     (4)Hyperkalemia - worsening slowly, despite being on Nepro    (5)Pulm- hypercapnic respiratory failure on admission - now s/p trach; remains on vent     (6)CV - normotensive     (7)ID - on IV Meropenem/Vanco    RECOMMEND:  (1)Reinitiation of HD starting today, then HD on Saturday 9/9  (2)No objection to PRBCs 1U with HD today  (3)Prednisone reduction to 20mg po qd today  (4)Dose new meds for GFR <10/HD      family counseled    Jimmy Colon MD  [facility]  Office/on call physician: [phone]  Cell (7a-7p): [phone]

## 2023-09-07 NOTE — CHART NOTE - NSCHARTNOTEFT_GEN_A_CORE
Brief note:  -Please reach out to GI team re: final decision pending patient's  discussion with his daughters   -would need optimization of respiratory failure and infection [?aspiration vs otitis externa] prior to proceeding with endoscopic PEG placement      Faisal Webb MD  Gastroenterology/Hepatology Fellow  Contact on-call GI fellow via answering service ([phone]) from 5pm-8am AND on weekends/holidays

## 2023-09-07 NOTE — PROGRESS NOTE ADULT - ASSESSMENT
72 y/o F DM on insulin, HTN, CKD,breast CA, respiratory arrest and cardiac arrest (2018), CVA with residual weakness, aspiration PNA h/o trache, PEG, both removed presents with respiratory distress requiring intubation. Had recent admission d/c 7/22/23 for cough and respiratory distress (no intubation) thought to be i/s/o aspiration PNA s/p cefepime course; developed rash in response. Infectious w/u was negative at this time. Was discharged home, daughter and  at bedside providing history. Patient admitted to MICU for higher level of care, intubated & sedated on fentanyl & propofol. While in the MICU, patient was observed to have RUE shaking. Ativan given & EEG started, pending final report. Neurology consulted for further recommendations. Patient unable to offer history at this time. At bedside, daughter &  offer collateral. As per daughter, patient follows with Dr. Garner for stroke management since 2018 and Dr. Poon for tremors. Patient's tremors usually consist of RUE or head shaking, can be stopped by the patient with effort; this has been ongoing for 2-3 years. However, as of the past 2-3 weeks, patient has been experiencing more frequent, more severe RUE shaking intermittently; during these episodes patient is awake and alert. Does not take medications for tremors. No hx of vocal tremors. No hx of seizure, incontinence, tongue bite. For memory loss, patient has been taking Donepezil. Family inquiring about taking alternative supplements instead of Donepezil. At baseline patient wheelchair/bedbound and requires assistance with ADLs. CTH obtained 8/11, no interval change noted. EEG w/o evidence of seizures. Concern for dysconjugate gaze and roving eye movements 8/15, repeat CTH unchanged but unable to visualize brainstem well. Leukocytosis increasing    Impression:   1) Worsening RUE shaking of unclear etiology at this time; no evidence of seizures, resolved   2) AMS possibly related to TME, no sz on EEG. Dysconjugate gaze and roving eye movements improved. CTH unchanged although not able to visualize brainstem well. Can consider uremic encephaloapthy as kidney function worsening/ infectious encephalopathy   3) L PCA stroke, ESUS s/p ILR, also with bilateral centrum semiovale watershed infarcts   4) New Embolic strokes seen on MRI 8/17 - R cerebellum, midbrain, multiple other tiny embolic infarcts. Central midbrain stroke may be responsible for inability to trigger vent   5) Dementia   6) MSSA bacteremia, r/o endocarditis    Recommendations   [] appreciate ID recs -  on Vanco for MSSA bacteremia and Meropenem for E Coli   [] repeat TTE or consider STEPHANIE to rule out endocarditis   [] family declined kidney biopsy for AIN -> s/p steroid tx   [] C/w home ASA 81 mg if no contraindications   [] C/w home Atorvastatin 10 mg qhs   [] would interrogate ILR and review tele to see if pAF -. no AF seen  [] DVT/GI ppx  [] Neurochecks q4hr   [] BP goals: Normotension   [] PT/OT when able   [] Diet: NPO w/ tube feeds, family considering PEG    [] HgA1C goals < 7.0   [] continue to address GOC with family    D/w  at bedside     Katty Charles DO  Vascular Neurology  Office [phone]

## 2023-09-07 NOTE — PROCEDURE NOTE - INTERROGATION NOTE: COMMENTS
One episode of SR with APC's at 100 bpm lasted for 2 minutes falsely recorded as AF on 9/7 at 11:10 am

## 2023-09-07 NOTE — PROGRESS NOTE ADULT - SUBJECTIVE AND OBJECTIVE BOX
S: Patient seen and examined.   S/p trach      Medications: MEDICATIONS  (STANDING):  albuterol/ipratropium for Nebulization 3 milliLiter(s) Nebulizer every 6 hours  atorvastatin 40 milliGRAM(s) Oral at bedtime  chlorhexidine 0.12% Liquid 15 milliLiter(s) Oral Mucosa every 12 hours  chlorhexidine 2% Cloths 1 Application(s) Topical daily  ciprofloxacin/hydrocortisone Suspension Otic 4 Drop(s) Left Ear two times a day  dextrose 5%. 1000 milliLiter(s) (50 mL/Hr) IV Continuous <Continuous>  dextrose 5%. 1000 milliLiter(s) (100 mL/Hr) IV Continuous <Continuous>  dextrose 50% Injectable 12.5 Gram(s) IV Push once  dextrose 50% Injectable 25 Gram(s) IV Push once  dextrose 50% Injectable 25 Gram(s) IV Push once  doxazosin 6 milliGRAM(s) Oral <User Schedule>  FIRST- Mouthwash  BLM 10 milliLiter(s) Swish and Spit four times a day  glucagon  Injectable 1 milliGRAM(s) IntraMuscular once  insulin lispro (ADMELOG) corrective regimen sliding scale   SubCutaneous every 6 hours  insulin NPH human recombinant 3 Unit(s) SubCutaneous every 6 hours  meropenem  IVPB 500 milliGRAM(s) IV Intermittent every 12 hours  pantoprazole  Injectable 40 milliGRAM(s) IV Push two times a day  sevelamer carbonate Powder 1600 milliGRAM(s) Oral three times a day  sodium chloride 3%  Inhalation 4 milliLiter(s) Inhalation every 6 hours    MEDICATIONS  (PRN):  acetaminophen   Oral Liquid .. 650 milliGRAM(s) Oral every 6 hours PRN Temp greater or equal to 38C (100.4F), Mild Pain (1 - 3)  dextrose Oral Gel 15 Gram(s) Oral once PRN Blood Glucose LESS THAN 70 milliGRAM(s)/deciliter       Vitals:  Vital Signs Last 24 Hrs  T(C): 36.7 (07 Sep 2023 17:31), Max: 37.1 (06 Sep 2023 21:20)  T(F): 98.1 (07 Sep 2023 17:31), Max: 98.8 (06 Sep 2023 21:20)  HR: 14 (07 Sep 2023 19:29) (14 - 99)  BP: 113/42 (07 Sep 2023 17:31) (112/51 - 144/91)  BP(mean): 74 (07 Sep 2023 05:53) (65 - 79)  RR: 12 (07 Sep 2023 17:31) (12 - 14)  SpO2: 99% (07 Sep 2023 19:29) (97% - 100%)    Parameters below as of 07 Sep 2023 19:05  Patient On (Oxygen Delivery Method): ventilator      Neurological Exam:  Mental Status: Eyes open, looking around, follows simple commands. + trach and NGT   Cranial Nerves: PERRL slightly sluggish, R gaze preference but can bring eyes to midline, decreased BTT on the L?  Motor: Moves upper > lower extremities spontaneously with less movement in RUE  Sensation: WD to noxious x4  Reflexes: 1+ throughout at biceps, brachioradialis, triceps, patellars and ankles bilaterally and equal. No clonus.     I personally reviewed the below data/images/labs:       LABS:                          7.5    11.68 )-----------( 184      ( 07 Sep 2023 10:23 )             24.0     09-07    127<L>  |  91<L>  |  96<H>  ----------------------------<  239<H>  5.3   |  20<L>  |  3.29<H>    Ca    8.5      07 Sep 2023 06:00  Phos  5.6     09-07  Mg     2.50     09-07          Urinalysis Basic - ( 07 Sep 2023 06:00 )    Color: x / Appearance: x / SG: x / pH: x  Gluc: 239 mg/dL / Ketone: x  / Bili: x / Urobili: x   Blood: x / Protein: x / Nitrite: x   Leuk Esterase: x / RBC: x / WBC x   Sq Epi: x / Non Sq Epi: x / Bacteria: x                < from: CT Head No Cont (08.15.23 @ 17:20) >    ACC: [id number] EXAM:  CT BRAIN   ORDERED BY: MINAL SAM     PROCEDURE DATE:  08/15/2023          INTERPRETATION:  Clinical indication: Change in neuro exam.    Multiple axial sections were performed from base to vertex without   contrast enhancement. Coronal and sagittal reconstructions were   reformatted well.    This exam is compared prior head CT performed on August 11, 2023    Parenchymal volume loss and chronic microvessel ischemic changes are   again seen.    Abnormal low-attenuation involving the left occipital cortical   subcortical region is again seen. 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    No evidence of acute hemorrhage mass or mass effect is seen.    Evaluation of the osseous structures with appropriate window demonstrates   sclerotic changes about the left mastoid region which appears stable.   Opacification left middle ear region is again seen.    Patient is status post bilateral cataract surgery.    Impression: Stable exam.    < end of copied text >    vEEG:    Clinical Impression:  - Severe diffuse non-specific cerebral dysfunction  - There were no epileptiform abnormalities or seizures recorded.        CTH 8/11:    VENTRICLES AND SULCI: Age-appropriate involutional change  INTRA-AXIAL:  Old left PCA infarct as seen on the prior unchanged.   Microvascular ischemic changes involving the periventricular and   subcortical white matter as seen previously  EXTRA-AXIAL:  No mass or collection is seen.  VISUALIZED SINUSES:  Clear.  VISUALIZED MASTOIDS: Left mastoid sclerosis  CALVARIUM: Infiltrative appearance tothe calvarium may be indicative of   marrow infiltration on the basis of patient's known diagnosis of breast   cancer. MISCELLANEOUS:  None.    IMPRESSION:  No significant interval change compared with 7/17/2023 in   left PCA infarct which is old. Microvascular ischemic changes involving   the periventricular and subcortical white matter as seen   previously.Questionable lesions at the level of the calvarium related to   possible breast CA. Clinical correlation recommended.    --- End of Report ---

## 2023-09-07 NOTE — PROGRESS NOTE ADULT - SUBJECTIVE AND OBJECTIVE BOX
Patient on vent. Appears to be in distress due to left ear pain. unable to answer any questions.     AVSS,  HEENT:  Left ear. No mastoid tenderness. + swelling and Excoriation to left auricle. + tenderness to manipulation of auricle. Debrided crusting of auricle  Removed left ear wick. EAC edema. unable to visualize TM. Derided mild watery exudate. Replaced wick.

## 2023-09-07 NOTE — PROGRESS NOTE ADULT - NS ATTEND AMEND GEN_ALL_CORE FT
agree with above   and daughters at  bedside  ID, renal, cardiology, ENT, GI follow ups appreciated  Pt is afebrile, repeat blood cxs negative. Ear cx negative  Restarting on HD, plan for tunneled catheter  Continue ana cristinaayado now HD, cipro otitis  Family understands need for PEG   OOB to chair

## 2023-09-08 NOTE — PHYSICAL THERAPY INITIAL EVALUATION ADULT - PRECAUTIONS/LIMITATIONS, REHAB EVAL
Patient on trach/aspiration precautions/cardiac precautions/fall precautions/oxygen therapy device and L/min
fall precautions

## 2023-09-08 NOTE — PROGRESS NOTE ADULT - NS ATTEND AMEND GEN_ALL_CORE FT
agree with above  Tm 100.5 axillary. hemodynamically stable  blood cx sent - in lab  sputum cx, RVP, ua if urine outpt, repeat labs, LE doppler  tx a u prbc for anemia  already on ana cristina and vanco (HD)

## 2023-09-08 NOTE — PROGRESS NOTE ADULT - ASSESSMENT
73F PMH DM, CVA, HTN admited for resp failure w/trach, bacteremia p/w L OE 9/5- Today white/yellow debris suctioned from ear, unable to visualize TM

## 2023-09-08 NOTE — PROGRESS NOTE ADULT - SUBJECTIVE AND OBJECTIVE BOX
S: Patient seen and examined. No overnight events.  at bedside      Medications: MEDICATIONS  (STANDING):  albuterol/ipratropium for Nebulization 3 milliLiter(s) Nebulizer every 6 hours  atorvastatin 40 milliGRAM(s) Oral at bedtime  chlorhexidine 0.12% Liquid 15 milliLiter(s) Oral Mucosa every 12 hours  chlorhexidine 2% Cloths 1 Application(s) Topical daily  ciprofloxacin/hydrocortisone Suspension Otic 4 Drop(s) Left Ear two times a day  dextrose 5%. 1000 milliLiter(s) (100 mL/Hr) IV Continuous <Continuous>  dextrose 5%. 1000 milliLiter(s) (50 mL/Hr) IV Continuous <Continuous>  dextrose 50% Injectable 25 Gram(s) IV Push once  dextrose 50% Injectable 25 Gram(s) IV Push once  dextrose 50% Injectable 12.5 Gram(s) IV Push once  doxazosin 6 milliGRAM(s) Oral <User Schedule>  FIRST- Mouthwash  BLM 10 milliLiter(s) Swish and Spit four times a day  glucagon  Injectable 1 milliGRAM(s) IntraMuscular once  insulin lispro (ADMELOG) corrective regimen sliding scale   SubCutaneous every 6 hours  insulin NPH human recombinant 3 Unit(s) SubCutaneous every 6 hours  pantoprazole  Injectable 40 milliGRAM(s) IV Push two times a day  sevelamer carbonate Powder 1600 milliGRAM(s) Oral three times a day  sodium chloride 3%  Inhalation 4 milliLiter(s) Inhalation every 6 hours    MEDICATIONS  (PRN):  acetaminophen   Oral Liquid .. 650 milliGRAM(s) Oral every 6 hours PRN Temp greater or equal to 38C (100.4F), Mild Pain (1 - 3)  dextrose Oral Gel 15 Gram(s) Oral once PRN Blood Glucose LESS THAN 70 milliGRAM(s)/deciliter       Vitals:  Vital Signs Last 24 Hrs  T(C): 36.8 (08 Sep 2023 16:00), Max: 38.1 (08 Sep 2023 05:35)  T(F): 98.2 (08 Sep 2023 16:00), Max: 100.5 (08 Sep 2023 05:35)  HR: 102 (08 Sep 2023 16:00) (14 - 106)  BP: 137/50 (08 Sep 2023 16:00) (111/35 - 158/51)  BP(mean): 75 (08 Sep 2023 05:35) (66 - 75)  RR: 18 (08 Sep 2023 16:00) (12 - 20)  SpO2: 98% (08 Sep 2023 16:00) (95% - 100%)    Parameters below as of 08 Sep 2023 16:00  Patient On (Oxygen Delivery Method): ventilator, ac          Neurological Exam:  Mental Status: Eyes open, looking around, follows simple commands. + trach and NGT   Cranial Nerves: PERRL slightly sluggish, Orthophoric gaze, decreased BTT on the L?  Motor: Moves upper > lower extremities spontaneously with less movement in RUE  Sensation: WD to noxious x4  Reflexes: 1+ throughout at biceps, brachioradialis, triceps, patellars and ankles bilaterally and equal. No clonus.     I personally reviewed the below data/images/labs:       LABS:                          6.4    11 )-----------( 169      ( 08 Sep 2023 08:00 )             20.4     09-07    127<L>  |  91<L>  |  96<H>  ----------------------------<  239<H>  5.3   |  20<L>  |  3.29<H>    Ca    8.5      07 Sep 2023 06:00  Phos  5.6       Mg     2.50         TPro  5.7<L>  /  Alb  2.2<L>  /  TBili  0.2  /  DBili  <0.2  /  AST  24  /  ALT  14  /  AlkPhos  200<H>      LIVER FUNCTIONS - ( 08 Sep 2023 07:51 )  Alb: 2.2 g/dL / Pro: 5.7 g/dL / ALK PHOS: 200 U/L / ALT: 14 U/L / AST: 24 U/L / GGT: x             Urinalysis Basic - ( 08 Sep 2023 11:34 )    Color: Yellow / Appearance: Clear / S.022 / pH: x  Gluc: x / Ketone: Trace mg/dL  / Bili: Negative / Urobili: 0.2 mg/dL   Blood: x / Protein: 300 mg/dL / Nitrite: Negative   Leuk Esterase: Moderate / RBC: 43 /HPF / WBC 41 /HPF   Sq Epi: x / Non Sq Epi: 2 /HPF / Bacteria: Negative /HPF            < from: CT Head No Cont (08.15.23 @ 17:20) >    ACC: [id number] EXAM:  CT BRAIN   ORDERED BY: MINAL SAM     PROCEDURE DATE:  08/15/2023          INTERPRETATION:  Clinical indication: Change in neuro exam.    Multiple axial sections were performed from base to vertex without   contrast enhancement. Coronal and sagittal reconstructions were   reformatted well.    This exam is compared prior head CT performed on 2023    Parenchymal volume loss and chronic microvessel ischemic changes are   again seen.    Abnormal low-attenuation involving the left occipital cortical   subcortical region is again seen. 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    No evidence of acute hemorrhage mass or mass effect is seen.    Evaluation of the osseous structures with appropriate window demonstrates   sclerotic changes about the left mastoid region which appears stable.   Opacification left middle ear region is again seen.    Patient is status post bilateral cataract surgery.    Impression: Stable exam.    < end of copied text >    vEEG:    Clinical Impression:  - Severe diffuse non-specific cerebral dysfunction  - There were no epileptiform abnormalities or seizures recorded.        CTH :    VENTRICLES AND SULCI: Age-appropriate involutional change  INTRA-AXIAL:  Old left PCA infarct as seen on the prior unchanged.   Microvascular ischemic changes involving the periventricular and   subcortical white matter as seen previously  EXTRA-AXIAL:  No mass or collection is seen.  VISUALIZED SINUSES:  Clear.  VISUALIZED MASTOIDS: Left mastoid sclerosis  CALVARIUM: Infiltrative appearance tothe calvarium may be indicative of   marrow infiltration on the basis of patient's known diagnosis of breast   cancer. MISCELLANEOUS:  None.    IMPRESSION:  No significant interval change compared with 2023 in   left PCA infarct which is old. Microvascular ischemic changes involving   the periventricular and subcortical white matter as seen   previously.Questionable lesions at the level of the calvarium related to   possible breast CA. Clinical correlation recommended.    --- End of Report ---

## 2023-09-08 NOTE — PROGRESS NOTE ADULT - SUBJECTIVE AND OBJECTIVE BOX
Subjective: Patient seen and examined. No new events except as noted.   fever   restarted IV abx     REVIEW OF SYSTEMS:  Unable to obtain     MEDICATIONS:  MEDICATIONS  (STANDING):  albuterol/ipratropium for Nebulization 3 milliLiter(s) Nebulizer every 6 hours  atorvastatin 40 milliGRAM(s) Oral at bedtime  chlorhexidine 0.12% Liquid 15 milliLiter(s) Oral Mucosa every 12 hours  chlorhexidine 2% Cloths 1 Application(s) Topical daily  ciprofloxacin/hydrocortisone Suspension Otic 4 Drop(s) Left Ear two times a day  dextrose 5%. 1000 milliLiter(s) (100 mL/Hr) IV Continuous <Continuous>  dextrose 5%. 1000 milliLiter(s) (50 mL/Hr) IV Continuous <Continuous>  dextrose 50% Injectable 25 Gram(s) IV Push once  dextrose 50% Injectable 25 Gram(s) IV Push once  dextrose 50% Injectable 12.5 Gram(s) IV Push once  doxazosin 6 milliGRAM(s) Oral <User Schedule>  FIRST- Mouthwash  BLM 10 milliLiter(s) Swish and Spit four times a day  glucagon  Injectable 1 milliGRAM(s) IntraMuscular once  insulin lispro (ADMELOG) corrective regimen sliding scale   SubCutaneous every 6 hours  insulin NPH human recombinant 3 Unit(s) SubCutaneous every 6 hours  meropenem  IVPB 500 milliGRAM(s) IV Intermittent every 12 hours  pantoprazole  Injectable 40 milliGRAM(s) IV Push two times a day  predniSONE   Tablet 20 milliGRAM(s) Oral daily  sevelamer carbonate Powder 1600 milliGRAM(s) Oral three times a day  sodium chloride 3%  Inhalation 4 milliLiter(s) Inhalation every 6 hours      PHYSICAL EXAM:  T(C): 37.9 (09-08-23 @ 08:00), Max: 38.1 (09-08-23 @ 05:35)  HR: 102 (09-08-23 @ 08:00) (14 - 106)  BP: 117/43 (09-08-23 @ 08:00) (112/51 - 158/51)  RR: 18 (09-08-23 @ 08:00) (12 - 20)  SpO2: 97% (09-08-23 @ 08:00) (95% - 100%)  Wt(kg): --  I&O's Summary    07 Sep 2023 07:01  -  08 Sep 2023 07:00  --------------------------------------------------------  IN: 1330 mL / OUT: 1400 mL / NET: -70 mL            Appearance: NAD, +trach  HEENT: dry oral mucosa  Lymphatic: No lymphadenopathy  Cardiovascular: Normal S1 S2, No JVD, No murmurs, No edema  Respiratory: Decreased BS, + trach to vent	  Neuro: opens eyes  Gastrointestinal: Soft, Non-tender, + BS, + OGT	  Skin: No rashes, No ecchymoses, No cyanosis	  Extremities: No strength/ROM 2/2 sedation, + BL LE edema  Vascular: Peripheral pulses palpable 2+ bilaterally        LABS:    CARDIAC MARKERS:                                x      11.22 )-----------( 169      ( 08 Sep 2023 08:00 )             x        09-07    127<L>  |  91<L>  |  96<H>  ----------------------------<  239<H>  5.3   |  20<L>  |  3.29<H>    Ca    8.5      07 Sep 2023 06:00  Phos  5.6     09-07  Mg     2.50     09-07    TPro  5.7<L>  /  Alb  2.2<L>  /  TBili  0.2  /  DBili  <0.2  /  AST  24  /  ALT  14  /  AlkPhos  200<H>  09-08        TELEMETRY: 	    ECG:  	  RADIOLOGY:   DIAGNOSTIC TESTING:  [ ] Echocardiogram:  [ ]  Catheterization:  [ ] Stress Test:    OTHER:

## 2023-09-08 NOTE — PROGRESS NOTE ADULT - ASSESSMENT
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 While in MICU patient noted with worsening renal failure requiring HD initiation, CGE/ Melena s/p EGD 8/31 found with esophagitis and erosive gastropathy, new right cerebellar infarct and fevers second to ESBL COLI and MSSA VAP, MSSA bacteremia, left ear otitis externa and drug rxn to cephalopsporins. 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 While in MICU patient noted with worsening renal failure requiring HD initiation, CGE/ Melena s/p EGD 8/31 found with esophagitis and erosive gastropathy, new right cerebellar infarct and fevers second to ESBL COLI and MSSA VAP, MSSA bacteremia, left ear otitis externa and drug rxn to cephalosporins Course further complicated by prolonged vent time s/p tracheostomy 9/1 and transferred to RCU 9/3 completed by recurrent fevers with high PIP, respiratory acidosis and concern for volume overloaded.     NEUROLOGY  # Metabolic Encephalopath vs NEW cerebella infarct   - Baseline MS AOX3 with short term memory changes per family however noted with concern for poor mentation in ICU  - MRI (8/17) with NEW right cerebellar infarct and old left PCA/occipital infarcts  - STEPHANIE (8/17) with AV showing two linear mobile echogenic elements attached to valve leaflets consistent with Lambel's excrescence, but no TRINITY thrombus, and lambel's excrescence with uncertain clinical implication.   - EEG (8/11-8/15) with no seizures   - ILR interrogation (9/7) with SR and PACs falsely recorded as AF.   - Attempted to resume ASA for medical management but held given GIB   - Continue on lipitor for medical management   - Course complicated by questionable head and body shaking with lethagy 9/8 and pending prolactin and spot EEG.   - Lethargy more likely second to respiratory acidosis.   - Monitor mentation closely     CARDIOVASCULAR  # HTN Urgency   # Septic vs vasoplegic shock   - Labile BPs ranging in between SBP 80s-200s and attempted labetalol, however noted with hypotension and bradycardia likely from BB toxicity vs shock state?    - Holding Labetalol and Catapres   - Continue on Cardura for now   - IF able/ needed to then goal would be to resume BP regimen with Coreg in lieu of Labetalol as Labetalol is renally cleared.   - Post Coreg and if needed Catapres can then be resumed.     # Hx of HFpEF with likely ADHF with volume overload.   - ECHO (7/2023) with EF 59 with severe LVH and diastolic dysfunction   - STEPHANIE (8/18) with normal LVRVSF however noted with increased LA pressures and persistent LA septal bowing into RA.   - ECHO (8/11) with EF 60 with mild LVH, normal LVRVSF, and mild ddfxn.  - Remove volume with HD sessions, however appears grossly volume overload and will attempt additional volume removal with bumex doses.      HEENT   # Left ear OE   - ENT evaluation (9/7) with no mastoid tenderness, however found with positive swelling and excoriation to left auricle and tenderness to manipulation of auricle. Debrided crusting of auricle and EAC evaluated with edema and unable to visualize TM. EAC debrided and found with watery exudate.   - Continue on CiproHC (9/5 - )   - Continue on Bradford (9/1 - ) as below   - ENT following and ear wick change QD    # Oral Lesions with questionable Zoster vs Trauma?   - Patient with tongue pain and seen with anterior ulcerations consolidating and crusting and posterior ulceration.  - Case discussed with pathology resident and culture/ cytology sent.   - HSV negative and Path (9/6) with normal appearing epithelial cells singly and in clusters devoid of viral cytopathic effect.   - Continue on magic mouth wash for pain    RESPIRATORY  # AHHRF second to volume overload   - Hx of CKD and HFpEF presented with SOB and hypercarbia second to volume overload requiring intubation and HD initiation   - Vent weaning attempted however limited requiring tracheostomy (9/1) with IP   - Continue on vent and wean as tolerated   - Continue on duonebs and HTS   - PIPs elevated (50s) and respiratory acidosis second to secretions vs volume (POCUS with BL effusions). Vent adjusted 20/300/5/30 without improvement.   - Attempt volume removal with diuresis and discuss aggressive UF sessions tomorrow.      GI  # UGIB   - CGE x 1 episode on 8/24 and melena x 2 episodes on 8/31 likely residual blood.   - EGD (8/31) found with esophagitis and erosive gastropathy  - Continue on PPI BID through late October and then PPI QD     # Dysphagia   - NGT-TF continued   - Pending PEG however needs improvement in infectious status prior to placement.     RENAL  # Progressive CKD   - Presented volume overload and progressive RUSS on CKD (baseline CRE in 2s and mode into the 3s on admission) likely obstruction vs low flow state with shock vs AIN from drug reaction issue?  - POCUS with no signs of hydronephrosis   - Pressor support started with improvement in renal function  - Attempted steroid course in ICU without improvement in renal function   - Case discussed at length with renal and initiated on HD in ICU and now continued on HD TTHS, however remains volume overloaded.   - Patient oliguric and will attempt bumex doses to offload more fluid   - Discuss aggressive UF sessions tomorrow   - Monitor renal function and UOP     # Hyperphosphatemia   - PTH normal and elevated phos likely from renal failure  - Phos binder with Renvela started with improvement.     INFECTIOUS DISEASE  # ESBL ECOLI and MSSA VAP c/b MSSA bacteremia   - RVP/ COVID (8/11) negative   - SCx (8/11) with MSSA s/p cefepime (8/12-8/13) and then ancef (8/14) however noted with drug reaction and then changed to vanco and aztreonam (8/12-8/13) in ICU .   - Course complicated by recurrent fevers and return of MSSA with bacteremia.   - SCx (9/1) with MSSA and ESBL ECOLI   - BAL (9/1) with MSSA   - BCx (9/1) with MSSA and cleared on (9/4)   - ECHO (9/6) unable to rule out endocarditis   - Continue on Bradford (9/4 - ) and Vanco BY DOSE POST HD (9/4, 9/7 ...) with duration TBD at this time. Given inability to rule out endocarditis will discuss possible 4 wks?   - Course complicated by fever (9/7) and UA with leuks but no bacteria, however compared to prior improved, RVP/COVID negative, and RPT CXR with questionable loculated effusion?   - Pending BCx, SCx, UCx, LE DOPPLERS, and CT CHEST     # Left ear OE   - ENT evaluation (9/7) with no mastoid tenderness, however found with positive swelling and excoriation to left auricle and tenderness to manipulation of auricle. Debrided crusting of auricle and EAC evaluated with edema and unable to visualize TM. EAC debrided and found with watery exudate.   - Continue on CiproHC (9/5 - )   - Continue on Bradford (9/1 - ) as below   - Given fevers pending CT TEMPORAL BONE     # MRSA PCRs   - MRSA PCR (8/11 and 8/14) negative   - Next MRSA PCR ordered for 9/10     HEME  # Anemia second to GIB vs renal disease   - Hold chemical DVT PPX given bleeding/ waxing and waning HH   - s/p multiple units of PRBCs (8/15, 8/21, 8/28, 8/31 and 9/8)   - Monitor HH and discuss with renal for EPO, however EPO may be limited as hx of malignancy. Check anemia panel in morning.     ONC   # Hx of Breast CA   - Patient dx in 2018 and s/p BL mastectomy (radical on right) and chemo and RT.   - Supportive care.     ENDOCRINE  # IDDM2   - Continue on NPH 3U and ISS   - Monitor FS and adjust as needed     LINES/ TUBES  - R ARTHUR TAYLOR (8/19 - )     SKIN  # Drug Eruption   - Attempted cefepime (8/12) vs ancef (8/13-8/14) and then noted with drug rxn.   - Hold further cephalosporins at this time   - Continue on steroids with prednisone 40 (8/18 - 9/2) then prednisone 30 (9/3-9/7), then prednisone 20 (9/8 - 9/10), then prednisone 10mg (9/11-9/13) then turn off.     ETHICS/ GOC    - FULL CODE     DISPO - TBD

## 2023-09-08 NOTE — CHART NOTE - NSCHARTNOTEFT_GEN_A_CORE
GI Brief note:    Patient with fever overnight  Still ongoing discussion if within goal    GI will sign off. Please call us back once infectious work up completed, patient medically optimized, patient is afebrile for at least 48 hrs, and family has made a decision.      Faisal Webb MD  Gastroenterology/Hepatology Fellow

## 2023-09-08 NOTE — PROGRESS NOTE ADULT - ASSESSMENT
IMPRESSION: 73F w/ HTN, DM2, CVA, breast CA-bilateral mastectomy, recurrent aspiration pneumonia/respiratory failure, and CKD, 8/11/23 p/w acute hypercapnic respiratory failure; c/b RUSS    (1)CKD - stage 4     (2)RUSS - ATN vs AIN -BUN and creatinine not only worsening as of today, but we are now seeing several worsening electrolyte derangements, (hyponatremia, hyperkalemia, hyperphosphatemia). Need for further HD here appears inevitable. I would restart HD today     (3)Hyponatremia - worsening as of yesterday     (4)Hyperkalemia - worsening slowly as of yesterday, despite being on Nepro    (5)Pulm- hypercapnic respiratory failure on admission - now s/p trach; remains on vent     (6)CV - normotensive     (7)ID - on IV Meropenem/Vanco    RECOMMEND:  (1)Next HD tomorrow  (2)A/w PRBC per primary team   (3)Prednisone taper, on 20mg po qd   (4)Dose new meds for GFR <10/HD    Nilda Espinoza, HEAVEN  [facility]  [phone]            IMPRESSION: 73F w/ HTN, DM2, CVA, breast CA-bilateral mastectomy, recurrent aspiration pneumonia/respiratory failure, and CKD, 8/11/23 p/w acute hypercapnic respiratory failure; c/b RUSS    (1)CKD - stage 4     (2)RUSS - ATN vs AIN -BUN and creatinine not only worsening as of today, but we are now seeing several worsening electrolyte derangements, (hyponatremia, hyperkalemia, hyperphosphatemia). Need for further HD here appears inevitable. I would restart HD today     (3)Hyponatremia - worsening as of yesterday     (4)Hyperkalemia - worsening slowly as of yesterday, despite being on Nepro    (5)Pulm- hypercapnic respiratory failure on admission - now s/p trach; remains on vent     (6)CV - normotensive     (7)ID - on IV Meropenem/Vanco    RECOMMEND:  (1)Next HD tomorrow  (2)A/w PRBC per primary team   (3)Prednisone taper, on 20mg po qd   (4)Dose new meds for GFR <10/HD    Nilda Espinoza DNP  [facility]  [phone]       RENAL ATTENDING NOTE  Patient seen and examined with NP. Agree with assessment and plan as above.  F/U pan-cx; shiley removal if BCx (+)    Jimmy Colon MD  [facility]  [phone]

## 2023-09-08 NOTE — PROGRESS NOTE ADULT - SUBJECTIVE AND OBJECTIVE BOX
NEPHROLOGY     Patient seen and examined.    MEDICATIONS  (STANDING):  albuterol/ipratropium for Nebulization 3 milliLiter(s) Nebulizer every 6 hours  atorvastatin 40 milliGRAM(s) Oral at bedtime  chlorhexidine 0.12% Liquid 15 milliLiter(s) Oral Mucosa every 12 hours  chlorhexidine 2% Cloths 1 Application(s) Topical daily  ciprofloxacin/hydrocortisone Suspension Otic 4 Drop(s) Left Ear two times a day  dextrose 5%. 1000 milliLiter(s) (50 mL/Hr) IV Continuous <Continuous>  dextrose 5%. 1000 milliLiter(s) (100 mL/Hr) IV Continuous <Continuous>  dextrose 50% Injectable 12.5 Gram(s) IV Push once  dextrose 50% Injectable 25 Gram(s) IV Push once  dextrose 50% Injectable 25 Gram(s) IV Push once  doxazosin 6 milliGRAM(s) Oral <User Schedule>  FIRST- Mouthwash  BLM 10 milliLiter(s) Swish and Spit four times a day  glucagon  Injectable 1 milliGRAM(s) IntraMuscular once  insulin lispro (ADMELOG) corrective regimen sliding scale   SubCutaneous every 6 hours  insulin NPH human recombinant 3 Unit(s) SubCutaneous every 6 hours  meropenem  IVPB 500 milliGRAM(s) IV Intermittent every 12 hours  pantoprazole  Injectable 40 milliGRAM(s) IV Push two times a day  predniSONE   Tablet 20 milliGRAM(s) Oral daily  sevelamer carbonate Powder 1600 milliGRAM(s) Oral three times a day  sodium chloride 3%  Inhalation 4 milliLiter(s) Inhalation every 6 hours    VITALS:  T(C): , Max: 38.1 (09-08-23 @ 05:35)  T(F): , Max: 100.5 (09-08-23 @ 05:35)  HR: 102 (09-08-23 @ 08:00)  BP: 117/43 (09-08-23 @ 08:00)  BP(mean): 75 (09-08-23 @ 05:35)  RR: 18 (09-08-23 @ 08:00)  SpO2: 97% (09-08-23 @ 08:00)    I and O's:    09-07 @ 07:01  -  09-08 @ 07:00  --------------------------------------------------------  IN: 1330 mL / OUT: 1400 mL / NET: -70 mL    PHYSICAL EXAM:  Constitutional: lethargic, nad, following simple commands  HEENT: (+)NGT  Neck: (+)trach-vent  Respiratory: coarse BS b/l  Cardiovascular: reg s1s2  Gastrointestinal: BS+, soft, NT/ND  Extremities: + peripheral edema  Neurological: tone WNL  : no Wheeler  Skin: No rashes  Access: [facility] HD catheter (8/19)    LABS:                        7.5    11.68 )-----------( 184      ( 07 Sep 2023 10:23 )             24.0     09-07    127<L>  |  91<L>  |  96<H>  ----------------------------<  239<H>  5.3   |  20<L>  |  3.29<H>    Ca    8.5      07 Sep 2023 06:00  Phos  5.6     09-07  Mg     2.50     09-07    TPro  5.7<L>  /  Alb  2.2<L>  /  TBili  0.2  /  DBili  <0.2  /  AST  24  /  ALT  14  /  AlkPhos  200<H>  09-08    Urine Studies:  Urinalysis Basic - ( 07 Sep 2023 06:00 )    Color: x / Appearance: x / SG: x / pH: x  Gluc: 239 mg/dL / Ketone: x  / Bili: x / Urobili: x   Blood: x / Protein: x / Nitrite: x   Leuk Esterase: x / RBC: x / WBC x   Sq Epi: x / Non Sq Epi: x / Bacteria: x   NEPHROLOGY     Patient seen and examined with  at bedside, trach to vent, febrile, tolerated HD yesterday and removed 1kg.     MEDICATIONS  (STANDING):  albuterol/ipratropium for Nebulization 3 milliLiter(s) Nebulizer every 6 hours  atorvastatin 40 milliGRAM(s) Oral at bedtime  chlorhexidine 0.12% Liquid 15 milliLiter(s) Oral Mucosa every 12 hours  chlorhexidine 2% Cloths 1 Application(s) Topical daily  ciprofloxacin/hydrocortisone Suspension Otic 4 Drop(s) Left Ear two times a day  dextrose 5%. 1000 milliLiter(s) (50 mL/Hr) IV Continuous <Continuous>  dextrose 5%. 1000 milliLiter(s) (100 mL/Hr) IV Continuous <Continuous>  dextrose 50% Injectable 12.5 Gram(s) IV Push once  dextrose 50% Injectable 25 Gram(s) IV Push once  dextrose 50% Injectable 25 Gram(s) IV Push once  doxazosin 6 milliGRAM(s) Oral <User Schedule>  FIRST- Mouthwash  BLM 10 milliLiter(s) Swish and Spit four times a day  glucagon  Injectable 1 milliGRAM(s) IntraMuscular once  insulin lispro (ADMELOG) corrective regimen sliding scale   SubCutaneous every 6 hours  insulin NPH human recombinant 3 Unit(s) SubCutaneous every 6 hours  meropenem  IVPB 500 milliGRAM(s) IV Intermittent every 12 hours  pantoprazole  Injectable 40 milliGRAM(s) IV Push two times a day  predniSONE   Tablet 20 milliGRAM(s) Oral daily  sevelamer carbonate Powder 1600 milliGRAM(s) Oral three times a day  sodium chloride 3%  Inhalation 4 milliLiter(s) Inhalation every 6 hours    VITALS:  T(C): , Max: 38.1 (09-08-23 @ 05:35)  T(F): , Max: 100.5 (09-08-23 @ 05:35)  HR: 102 (09-08-23 @ 08:00)  BP: 117/43 (09-08-23 @ 08:00)  BP(mean): 75 (09-08-23 @ 05:35)  RR: 18 (09-08-23 @ 08:00)  SpO2: 97% (09-08-23 @ 08:00)    I and O's:    09-07 @ 07:01  -  09-08 @ 07:00  --------------------------------------------------------  IN: 1330 mL / OUT: 1400 mL / NET: -70 mL    PHYSICAL EXAM:  Constitutional: lethargic, nad, following simple commands  HEENT: (+)NGT  Neck: (+)trach-vent  Respiratory: coarse BS b/l  Cardiovascular: tachy s1s2  Gastrointestinal: BS+, soft, NT/ND  Extremities: + peripheral edema  Neurological: tone WNL  : no Wheeler  Skin: No rashes  Access: [facility] HD catheter (8/19)    LABS:                        7.5    11.68 )-----------( 184      ( 07 Sep 2023 10:23 )             24.0     09-07    127<L>  |  91<L>  |  96<H>  ----------------------------<  239<H>  5.3   |  20<L>  |  3.29<H>    Ca    8.5      07 Sep 2023 06:00  Phos  5.6     09-07  Mg     2.50     09-07    TPro  5.7<L>  /  Alb  2.2<L>  /  TBili  0.2  /  DBili  <0.2  /  AST  24  /  ALT  14  /  AlkPhos  200<H>  09-08    Urine Studies:  Urinalysis Basic - ( 07 Sep 2023 06:00 )    Color: x / Appearance: x / SG: x / pH: x  Gluc: 239 mg/dL / Ketone: x  / Bili: x / Urobili: x   Blood: x / Protein: x / Nitrite: x   Leuk Esterase: x / RBC: x / WBC x   Sq Epi: x / Non Sq Epi: x / Bacteria: x

## 2023-09-08 NOTE — PHYSICAL THERAPY INITIAL EVALUATION ADULT - FOLLOWS COMMANDS/ANSWERS QUESTIONS, REHAB EVAL
unable to follow commands
unable to follow commands/unable to follow multi-step instructions/unable to answer questions

## 2023-09-08 NOTE — PHYSICAL THERAPY INITIAL EVALUATION ADULT - ADDITIONAL COMMENTS
history obtained via pt's  at bedside. Pt lives with her  in an apartment, +ramp and chair lift. Pt has been non-ambulatory since september 2022. Pt transfers from the hospital bed to the wheelchair with a lot/total assist from her /family member. per pt's , they had rented a lucrecia lift, however they returned it due to Pt "not liking it". Pt has a hospital bed, wheelchair and rolling walker. Pt also requires assistance with ADLs.    Pt. left comfortable in bed with all tubes/lines intact, head of bed elevated, call bell in reach and in NAD.
As per patients ; patient and  live in an apartment with a ramp to enter and a chair lift. Patient was last ambulatory in September 2022 with a rolling walker, has been primarily wheelchair bound.  states he transfers wife to and from wheelchair. Patient was receiving home PT/OT prior to admission, required assistance with ADL's and self care. Patient has a hospital bed, wheelchair, and rolling walker. Patient had a stroke which has mostly affected her right lower extremity. Patients  states patient is A&Ox3 at baseline, long term memory intact, short term memory with impairments.     Patient left semi-reclined in bed, all lines and tubes intact, bed alarm on, call muniz within reach, TAMRA Sanchez made aware.

## 2023-09-08 NOTE — PHYSICAL THERAPY INITIAL EVALUATION ADULT - DIAGNOSIS, PT EVAL
decreased mobility
decreased functional mobility and strength following prolonged hospitalization/ICU stay

## 2023-09-08 NOTE — PROGRESS NOTE ADULT - PROBLEM SELECTOR PLAN 1
-  kieran replaced 9/8, Continue with  meropenem, ciprodex x 10d  - Recommend  strict glucose control  - Recommend CT scan of IAC to better assess and ensure no bony destruction/ involvement -  kieran replaced 9/8, Continue with  meropenem, ciprodex x 10d  - Recommend  strict glucose control  - Recommend CT scan of Temporal bone to better assess and ensure no bony destruction/ involvement -  wick replaced 9/8, Continue with  broad spectrum antibiotics- unasyn , ciprodex x 10d  - Recommend  strict glucose control  - Recommend CT scan of Temporal bone to better assess and ensure no bony destruction/ involvement

## 2023-09-08 NOTE — PROGRESS NOTE ADULT - ASSESSMENT
72 y/o F DM on insulin, HTN, CKD,breast CA, respiratory arrest and cardiac arrest (2018), CVA with residual weakness, aspiration PNA h/o trache, PEG, both removed presents with respiratory distress requiring intubation. Had recent admission d/c 7/22/23 for cough and respiratory distress (no intubation) thought to be i/s/o aspiration PNA s/p cefepime course; developed rash in response. Infectious w/u was negative at this time. Was discharged home, daughter and  at bedside providing history. Patient admitted to MICU for higher level of care, intubated & sedated on fentanyl & propofol. While in the MICU, patient was observed to have RUE shaking. Ativan given & EEG started, pending final report. Neurology consulted for further recommendations. Patient unable to offer history at this time. At bedside, daughter &  offer collateral. As per daughter, patient follows with Dr. Garner for stroke management since 2018 and Dr. Poon for tremors. Patient's tremors usually consist of RUE or head shaking, can be stopped by the patient with effort; this has been ongoing for 2-3 years. However, as of the past 2-3 weeks, patient has been experiencing more frequent, more severe RUE shaking intermittently; during these episodes patient is awake and alert. Does not take medications for tremors. No hx of vocal tremors. No hx of seizure, incontinence, tongue bite. For memory loss, patient has been taking Donepezil. Family inquiring about taking alternative supplements instead of Donepezil. At baseline patient wheelchair/bedbound and requires assistance with ADLs. CTH obtained 8/11, no interval change noted. EEG w/o evidence of seizures. Concern for dysconjugate gaze and roving eye movements 8/15, repeat CTH unchanged but unable to visualize brainstem well. Leukocytosis increasing    Impression:   1) Worsening RUE shaking of unclear etiology at this time; no evidence of seizures, resolved   2) AMS possibly related to TME, no sz on EEG. Dysconjugate gaze and roving eye movements improved. CTH unchanged although not able to visualize brainstem well. Can consider uremic encephaloapthy as kidney function worsening/ infectious encephalopathy   3) L PCA stroke, ESUS s/p ILR, also with bilateral centrum semiovale watershed infarcts   4) New Embolic strokes seen on MRI 8/17 - R cerebellum, midbrain, multiple other tiny embolic infarcts. Central midbrain stroke may be responsible for inability to trigger vent   5) Dementia   6) MSSA bacteremia, r/o endocarditis    Recommendations   [] appreciate ID recs -  on Vanco for MSSA bacteremia and Meropenem for E Coli   [] repeat TTE or consider STEPHANIE to rule out endocarditis   [] family declined kidney biopsy for AIN -> s/p steroid tx   [] C/w home ASA 81 mg if no contraindications   [] C/w home Atorvastatin 10 mg qhs   [] would interrogate ILR and review tele to see if pAF -. no AF seen  [] DVT/GI ppx  [] Neurochecks q4hr   [] BP goals: Normotension   [] PT/OT when able   [] Diet: NPO w/ tube feeds, family considering PEG    [] HgA1C goals < 7.0   [] continue to address GOC with family    D/w  at bedside     Katty Charles DO  Vascular Neurology  Office [phone]

## 2023-09-08 NOTE — CHART NOTE - NSCHARTNOTEFT_GEN_A_CORE
R[facility] PA POCUS NOTE     : BETTINA Candelario     INDICATION: High PIPs    PROCEDURE:  [ x] LIMITED ECHO  [ ] LIMITED CHEST  [ ] LIMITED RETROPERITONEAL  [ ] LIMITED ABDOMINAL  [ ] LIMITED DVT  [ ] NEEDLE GUIDANCE VASCULAR  [ ] NEEDLE GUIDANCE THORACENTESIS  [ ] NEEDLE GUIDANCE PARACENTESIS  [ ] NEEDLE GUIDANCE PERICARDIOCENTESIS  [ ] OTHER    FINDINGS:  Bilateral lung sliding noted with B lines throughout (R>L). Bilateral pleural effusions (R>L) however unable to determine loculations.     INTERPRETATION:  Concern noted for volume overloaded given B lines and effusions    Alexey Candelario PA-C  Department of Medicine/ [facility]  In house [facility] [id number]  In house Medicine [facility] [id number]  Reachable via teams

## 2023-09-08 NOTE — PHYSICAL THERAPY INITIAL EVALUATION ADULT - PERTINENT HX OF CURRENT PROBLEM, REHAB EVAL
Patient is a 73 year old female admitted for respiratory distress requiring intubation, episode of coffee ground emesis and melena, for which GI was consulted and did EGD, colonoscopy, which found erosive gastropathy, requiring multiple transfusions. Patient trached and transferred to [facility] on 9/3. 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
Pt  is a 73 year old female admitted for respiratory distress requiring intubation, episode of coffee ground emesis and melena, for which GI was consulted and did esophagogastroduodenoscopy, colonoscopy, which found erosive gastropathy, requiring multiple transfusions. Patient trached and transferred to [facility] on 9/3. 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

## 2023-09-08 NOTE — PROGRESS NOTE ADULT - SUBJECTIVE AND OBJECTIVE BOX
Patient is a 73y old  Female who presents with a chief complaint of Respiratory distress (08 Sep 2023 12:39)      SUBJECTIVE / OVERNIGHT EVENTS: spiked temp to 100.6, being treated for Otitis externa on Cipro drops and on Meropenem, recs from ENT to get ct to r/o Mastoid bony involvement, ptn dropped Hgb to 6.4, will need a transfusion    MEDICATIONS  (STANDING):  albuterol/ipratropium for Nebulization 3 milliLiter(s) Nebulizer every 6 hours  atorvastatin 40 milliGRAM(s) Oral at bedtime  chlorhexidine 0.12% Liquid 15 milliLiter(s) Oral Mucosa every 12 hours  chlorhexidine 2% Cloths 1 Application(s) Topical daily  ciprofloxacin/hydrocortisone Suspension Otic 4 Drop(s) Left Ear two times a day  dextrose 5%. 1000 milliLiter(s) (100 mL/Hr) IV Continuous <Continuous>  dextrose 5%. 1000 milliLiter(s) (50 mL/Hr) IV Continuous <Continuous>  dextrose 50% Injectable 12.5 Gram(s) IV Push once  dextrose 50% Injectable 25 Gram(s) IV Push once  dextrose 50% Injectable 25 Gram(s) IV Push once  doxazosin 6 milliGRAM(s) Oral <User Schedule>  FIRST- Mouthwash  BLM 10 milliLiter(s) Swish and Spit four times a day  glucagon  Injectable 1 milliGRAM(s) IntraMuscular once  insulin lispro (ADMELOG) corrective regimen sliding scale   SubCutaneous every 6 hours  insulin NPH human recombinant 3 Unit(s) SubCutaneous every 6 hours  pantoprazole  Injectable 40 milliGRAM(s) IV Push two times a day  sevelamer carbonate Powder 1600 milliGRAM(s) Oral three times a day  sodium chloride 3%  Inhalation 4 milliLiter(s) Inhalation every 6 hours    MEDICATIONS  (PRN):  acetaminophen   Oral Liquid .. 650 milliGRAM(s) Oral every 6 hours PRN Temp greater or equal to 38C (100.4F), Mild Pain (1 - 3)  dextrose Oral Gel 15 Gram(s) Oral once PRN Blood Glucose LESS THAN 70 milliGRAM(s)/deciliter      Vital Signs Last 24 Hrs  T(F): 100.2 (23 @ 08:00), Max: 100.5 (23 @ 05:35)  HR: 99 (09-08-23 @ 10:52) (14 - 106)  BP: 117/43 (23 @ 08:00) (112/51 - 158/51)  RR: 18 (23 @ 08:00) (12 - 20)  SpO2: 99% (23 @ 10:52) (95% - 100%)  Telemetry:   CAPILLARY BLOOD GLUCOSE      POCT Blood Glucose.: 202 mg/dL (08 Sep 2023 11:28)  POCT Blood Glucose.: 179 mg/dL (08 Sep 2023 05:41)  POCT Blood Glucose.: 193 mg/dL (07 Sep 2023 23:47)  POCT Blood Glucose.: 256 mg/dL (07 Sep 2023 17:37)    I&O's Summary    07 Sep 2023 07:01  -  08 Sep 2023 07:00  --------------------------------------------------------  IN: 1330 mL / OUT: 1400 mL / NET: -70 mL        PHYSICAL EXAM:  GENERAL: NAD, well-developed  HEAD:  Atraumatic, Normocephalic  EYES: EOMI, PERRLA, conjunctiva and sclera clear  NECK: Supple, No JVD  CHEST/LUNG: Clear to auscultation bilaterally; No wheeze  HEART: Regular rate and rhythm; No murmurs, rubs, or gallops  ABDOMEN: Soft, Nontender, Nondistended; Bowel sounds present  EXTREMITIES:  2+ Peripheral Pulses, No clubbing, cyanosis, or edema  PSYCH: AAOx3  NEUROLOGY: non-focal  SKIN: No rashes or lesions    LABS:                        6.4    11.22 )-----------( 169      ( 08 Sep 2023 08:00 )             20.4     09-07    127<L>  |  91<L>  |  96<H>  ----------------------------<  239<H>  5.3   |  20<L>  |  3.29<H>    Ca    8.5      07 Sep 2023 06:00  Phos  5.6     09-  Mg     2.50     09-    TPro  5.7<L>  /  Alb  2.2<L>  /  TBili  0.2  /  DBili  <0.2  /  AST  24  /  ALT  14  /  AlkPhos  200<H>            Urinalysis Basic - ( 08 Sep 2023 11:34 )    Color: Yellow / Appearance: Clear / S.022 / pH: x  Gluc: x / Ketone: Trace mg/dL  / Bili: Negative / Urobili: 0.2 mg/dL   Blood: x / Protein: 300 mg/dL / Nitrite: Negative   Leuk Esterase: Moderate / RBC: 43 /HPF / WBC 41 /HPF   Sq Epi: x / Non Sq Epi: 2 /HPF / Bacteria: Negative /HPF        RADIOLOGY & ADDITIONAL TESTS:    Imaging Personally Reviewed:    Consultant(s) Notes Reviewed:      Care Discussed with Consultants/Other Providers:

## 2023-09-08 NOTE — CHART NOTE - NSCHARTNOTEFT_GEN_A_CORE
Notified by RN patient febrile TMax 100.5 and tachycardic to low 100s     Chart reviewed, patient assessed at bedside. No Hx or ROS available given mental status.    T(C): 38.1 (09-08-23 @ 05:35), Max: 38.1 (09-08-23 @ 05:35)  HR: 104 (09-08-23 @ 05:35) (14 - 106)  BP: 137/54 (09-08-23 @ 05:35) (112/51 - 158/51)  RR: 15 (09-08-23 @ 05:35) (12 - 20)  SpO2: 98% (09-08-23 @ 05:35) (95% - 100%)    Physical exam:  Gen: NAD  Cardiovascular: S1 S2. No MRC  Pulmonary: Scattered wheezing no rales or rhonchi appreciated   Abdomen: Soft, nontender, nondistended, normal active bowel sounds   Shiley site: No purulent discharge, swelling, erythema, TTP     INTERVENTIONS:  -1g IV APAP  -Duoneb     ASSESSMENT:  -Assessed at bedside for fever no Hx or ROS available given mental status. Tachycardic and febrile vitals otherwise stable. Wheezing appreciated on exam otherwise no localizing SxS.     PLAN:  -f/u Sepsis w/u   -f/u rpt temp   -Further management per day team    Varun Saravia PA-C  Department of Internal Medicine  In-House beeper number [id number]

## 2023-09-08 NOTE — PROGRESS NOTE ADULT - ASSESSMENT
72 y/o F well known to me from my housecal outpt practice. she was admitted at [facility] 7/12-7/22 w aspiration PNA, was treated w CEFEPIME, developed an allergic rash,  dCHF, + MAC on AFB culture, had been progressively getting more and more lethargic and dyspneic at home since DC.   In  am of 8/11/23  ptn presented with respiratory distress w hypoxia and hypercarbia requiring intubation 2/2 volume overload +/- Asp PNA      Neuro   responds appropriately   Baseline MS AOx3, aphasic   - h/o CVA , on aspirin and statin . 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 Mcnulty, now in [facility]CU  s/p septic shock overnight 9/1, now on meropenem, HDS  s/p trache, on vent support, copious secretions      Renal   Hyperkalemia with EKG changes  , initially treated w LOKELMA,  now resolved   Ptn well know to Dr. Colon, consult reviewed    HD 8/19,  8/21, 8/26 and 8/28.  s/p PUF 8/29 2.5 Liters, HD 8/30,  9/1, 9/4  starting chronic HD 9/7, removed a Liter, next session 9/9.      GI  NPO  on tube feeds  coffee ground emesis x 1 8/24, melena overnight 8/31, s/p 1UPRBC, EGD/Colonoscopy: erosive gastropathy, esophagisits, on colonoscopy old blood seen no active bleeding, poor prep  family to decide re PEG placement    Endocrine  T2DM   A1C 7.1 in 7/2023   - BG goal 140-200     ID   No fever/leukocytosis, recheck temp   Hx latent TB which was treated, no concern for TB   - grew MAC on AFB culture from most recent admission. at [facility]  -MSSA Bacteremia 9/1, allergic to cephalosprins and PCN, on Vanco as per ID, renal dosing,   - sputum also grew E.Coli-ESBL, as per ID recs for  [facility] through 9/7( 1 week course as per ID) , afebrile.  - today spike  temp 38.1C, has O. externa, has a wick, recs are to get a CT to R/O an abscess/bony involvement. cont Meropenem      Heme/Onc  ppx: place on SCD  Transfuse for hgb 6.4, no obv bleed. HDS    Ethics  GOC - Discussed GOC with daughter and , they have opted in the past for full code. and she remains full code at present

## 2023-09-08 NOTE — PHYSICAL THERAPY INITIAL EVALUATION ADULT - GENERAL OBSERVATIONS, REHAB EVAL
Patient found semi-reclined in bed, NAD, unable to formally assess A&O status, patient is trached, minimally responsive to verbal/tactile cues,  at bedside, confirmed with RN Laura patient cleared for PT/OT evaluations, spO2 100%
Pt received semi-supine, +tube feed, +IV blood transfusion, +trach to mechanical vent, all lines/tubes intact, NAD. Pt's  at bedside throughout.

## 2023-09-08 NOTE — PROGRESS NOTE ADULT - SUBJECTIVE AND OBJECTIVE BOX
ENT ISSUE/POD: Left ear pain     HPI: 73F PMH DM, CVA, HTN admited for resp failure w/trach, bacteremia p/w L OE 9/5- ENT consulted for Left ear pain. Pt seen and examined with  present.  expressing great sadness due to all of his wifes illnesses. Pt unable to verbalizes          PAST MEDICAL & SURGICAL HISTORY:  Breast CA      Diabetes      Stroke      Cardiac arrest      HTN (hypertension)      H/O mastectomy, bilateral        Allergies    isoniazid (Rash)  nafcillin (Unknown)  hydrALAZINE (Rash)  vitamin E (Short breath; Urticaria; Hives)  doxycycline (Rash)  cefepime (Rash)  NIFEdipine (Urticaria; Hives)    Intolerances      MEDICATIONS  (STANDING):  albuterol/ipratropium for Nebulization 3 milliLiter(s) Nebulizer every 6 hours  atorvastatin 40 milliGRAM(s) Oral at bedtime  chlorhexidine 0.12% Liquid 15 milliLiter(s) Oral Mucosa every 12 hours  chlorhexidine 2% Cloths 1 Application(s) Topical daily  ciprofloxacin/hydrocortisone Suspension Otic 4 Drop(s) Left Ear two times a day  dextrose 5%. 1000 milliLiter(s) (50 mL/Hr) IV Continuous <Continuous>  dextrose 5%. 1000 milliLiter(s) (100 mL/Hr) IV Continuous <Continuous>  dextrose 50% Injectable 12.5 Gram(s) IV Push once  dextrose 50% Injectable 25 Gram(s) IV Push once  dextrose 50% Injectable 25 Gram(s) IV Push once  doxazosin 6 milliGRAM(s) Oral <User Schedule>  FIRST- Mouthwash  BLM 10 milliLiter(s) Swish and Spit four times a day  glucagon  Injectable 1 milliGRAM(s) IntraMuscular once  insulin lispro (ADMELOG) corrective regimen sliding scale   SubCutaneous every 6 hours  insulin NPH human recombinant 3 Unit(s) SubCutaneous every 6 hours  pantoprazole  Injectable 40 milliGRAM(s) IV Push two times a day  sevelamer carbonate Powder 1600 milliGRAM(s) Oral three times a day  sodium chloride 3%  Inhalation 4 milliLiter(s) Inhalation every 6 hours    MEDICATIONS  (PRN):  acetaminophen   Oral Liquid .. 650 milliGRAM(s) Oral every 6 hours PRN Temp greater or equal to 38C (100.4F), Mild Pain (1 - 3)  dextrose Oral Gel 15 Gram(s) Oral once PRN Blood Glucose LESS THAN 70 milliGRAM(s)/deciliter                Vital Signs Last 24 Hrs  T(C): 37.9 (08 Sep 2023 08:00), Max: 38.1 (08 Sep 2023 05:35)  T(F): 100.2 (08 Sep 2023 08:00), Max: 100.5 (08 Sep 2023 05:35)  HR: 99 (08 Sep 2023 10:52) (14 - 106)  BP: 117/43 (08 Sep 2023 08:00) (112/51 - 158/51)  BP(mean): 75 (08 Sep 2023 05:35) (66 - 75)  RR: 18 (08 Sep 2023 08:00) (12 - 20)  SpO2: 99% (08 Sep 2023 10:52) (95% - 100%)    Parameters below as of 08 Sep 2023 08:00  Patient On (Oxygen Delivery Method): ventilator, ac    O2 Concentration (%): 40                          6.4    11.22 )-----------( 169      ( 08 Sep 2023 08:00 )             20.4    09-07    127<L>  |  91<L>  |  96<H>  ----------------------------<  239<H>  5.3   |  20<L>  |  3.29<H>    Ca    8.5      07 Sep 2023 06:00  Phos  5.6     09-07  Mg     2.50     09-07    TPro  5.7<L>  /  Alb  2.2<L>  /  TBili  0.2  /  DBili  <0.2  /  AST  24  /  ALT  14  /  AlkPhos  200<H>  09-08       PHYSICAL EXAM:  Gen: NAD  Skin: No rashes, bruises, or lesions  Head: Normocephalic, Atraumatic  Face: no edema, erythema, or fluctuance. Parotid glands soft without mass  Eyes: no scleral injection  Ears: Right - ear canal clear, TM intact without effusion or erythema. No evidence of any fluid drainage. No mastoid tenderness, erythema, or ear bulging            Left - + yellow crusing, EAC edematous, unable to visuzlize TM , + white /yellow debris suctioned from ear - Ear wick placed back   Neck: trach noted   Lymphatic: No lymphadenopathy  Resp: breathing easily, no stridor

## 2023-09-08 NOTE — CONSULT NOTE ADULT - SUBJECTIVE AND OBJECTIVE BOX
present.  Patient sleeping- limited exam performed.  Anterior ulcerations consolidating and crusting. Posterior ulceration appears unchanged.    Pathology report: Accession- [id number]  Left tongue- Normal appearing epithelial cells singly and in clusters devoid of viral cytopathic effect.     HSV results: negative    Clinical assessment: Traumatic ulceration; healing.    Plan:   (1) Discussed results of cytology culture and the noninfectious, benign nature of patient's oral condition with . Answered 's questions.   (2) Manage symptoms, palliative.   (3) If oral condition worsens, contact - oral pathology    Resident: Dr. Victoria (Tr) VELMA Lora  Attending: Dr. Rubio Ulrich, KHADRA

## 2023-09-08 NOTE — PHYSICAL THERAPY INITIAL EVALUATION ADULT - PASSIVE RANGE OF MOTION EXAMINATION, REHAB EVAL
bilateral upper extremity Passive ROM was WFL (within functional limits)/bilateral lower extremity Passive ROM was WFL (within functional limits)
noted decreased knee flexion and ankle DF, residual effects of previous CVA/bilateral upper extremity Passive ROM was WFL (within functional limits)/bilateral lower extremity Passive ROM was WFL (within functional limits)/deficits as listed below

## 2023-09-08 NOTE — PROGRESS NOTE ADULT - PROVIDER SPECIALTY LIST ADULT
Letter by Re Awan FNP at      Author: Re Awan FNP Service: -- Author Type: --    Filed:  Encounter Date: 10/17/2019 Status: Signed         Alba Hubbard  [address]  [place] [address]             October 17, 2019         Dear Ms. Hubbard,    Our records show that you are due for a colonoscopy.  We do want to inform you that there are a couple of other options besides the traditional colonoscopy that we offer.  If you would like to do the traditional colonoscopy, please contact a [facility] near you according to the card enclosed.  If you would like to do one of the other options available to you, please let you primary doctor know and they will get that ordered.  Enclosed is some information on the other options for you to read over.  If you have had any of these done at another facility, please arrange for us to receive those records so we can update your chart.    Please call with questions or contact us using [employer].    Sincerely,        Electronically signed by EVA Barrios        Pulmonology

## 2023-09-08 NOTE — PHYSICAL THERAPY INITIAL EVALUATION ADULT - MANUAL MUSCLE TESTING RESULTS, REHAB EVAL
unable to follow commands/not tested due to
unable to formally assess due to cognitive deficits and inability to follow commands

## 2023-09-08 NOTE — PROGRESS NOTE ADULT - SUBJECTIVE AND OBJECTIVE BOX
CHIEF COMPLAINT: Patient is a 73y old  Female who presents with a chief complaint of Respiratory distress (07 Sep 2023 19:44)      INTERVAL EVENTS:  - Fever spike TMAX 100.5F overnight and pending RPT BCx, SCx, UA, RVP and CXR   - Continue on ana cristina, vanco by dose (dosed yesterday) and ciproHC to left ear for now     REVIEW OF SYSTEMS: Seen by bedside during AM rounds and     Mode: AC/ CMV (Assist Control/ Continuous Mandatory Ventilation), RR (machine): 12, TV (machine): 360, FiO2: 40, PEEP: 5, ITime: 0.81, MAP: 11, PIP: 27      OBJECTIVE:  ICU Vital Signs Last 24 Hrs  T(C): 38.1 (08 Sep 2023 05:35), Max: 38.1 (08 Sep 2023 05:35)  T(F): 100.5 (08 Sep 2023 05:35), Max: 100.5 (08 Sep 2023 05:35)  HR: 92 (08 Sep 2023 07:32) (14 - 106)  BP: 137/54 (08 Sep 2023 05:35) (112/51 - 158/51)  BP(mean): 75 (08 Sep 2023 05:35) (66 - 75)  ABP: --  ABP(mean): --  RR: 15 (08 Sep 2023 05:35) (12 - 20)  SpO2: 96% (08 Sep 2023 07:32) (95% - 100%)    O2 Parameters below as of 08 Sep 2023 07:32  Patient On (Oxygen Delivery Method): conventional ventilator          Mode: AC/ CMV (Assist Control/ Continuous Mandatory Ventilation), RR (machine): 12, TV (machine): 360, FiO2: 40, PEEP: 5, ITime: 0.81, MAP: 11, PIP: 27    09-07 @ 07:01  -  09-08 @ 07:00  --------------------------------------------------------  IN: 1330 mL / OUT: 1400 mL / NET: -70 mL      CAPILLARY BLOOD GLUCOSE  POCT Blood Glucose.: 179 mg/dL (08 Sep 2023 05:41)      HOSPITAL MEDICATIONS:  MEDICATIONS  (STANDING):  albuterol/ipratropium for Nebulization 3 milliLiter(s) Nebulizer every 6 hours  atorvastatin 40 milliGRAM(s) Oral at bedtime  chlorhexidine 0.12% Liquid 15 milliLiter(s) Oral Mucosa every 12 hours  chlorhexidine 2% Cloths 1 Application(s) Topical daily  ciprofloxacin/hydrocortisone Suspension Otic 4 Drop(s) Left Ear two times a day  dextrose 5%. 1000 milliLiter(s) (50 mL/Hr) IV Continuous <Continuous>  dextrose 5%. 1000 milliLiter(s) (100 mL/Hr) IV Continuous <Continuous>  dextrose 50% Injectable 12.5 Gram(s) IV Push once  dextrose 50% Injectable 25 Gram(s) IV Push once  dextrose 50% Injectable 25 Gram(s) IV Push once  doxazosin 6 milliGRAM(s) Oral <User Schedule>  FIRST- Mouthwash  BLM 10 milliLiter(s) Swish and Spit four times a day  glucagon  Injectable 1 milliGRAM(s) IntraMuscular once  insulin lispro (ADMELOG) corrective regimen sliding scale   SubCutaneous every 6 hours  insulin NPH human recombinant 3 Unit(s) SubCutaneous every 6 hours  meropenem  IVPB 500 milliGRAM(s) IV Intermittent every 12 hours  pantoprazole  Injectable 40 milliGRAM(s) IV Push two times a day  predniSONE   Tablet 20 milliGRAM(s) Oral daily  sevelamer carbonate Powder 1600 milliGRAM(s) Oral three times a day  sodium chloride 3%  Inhalation 4 milliLiter(s) Inhalation every 6 hours    MEDICATIONS  (PRN):  acetaminophen   Oral Liquid .. 650 milliGRAM(s) Oral every 6 hours PRN Temp greater or equal to 38C (100.4F), Mild Pain (1 - 3)  dextrose Oral Gel 15 Gram(s) Oral once PRN Blood Glucose LESS THAN 70 milliGRAM(s)/deciliter      PHYSICAL EXAMINATION    LABS:                        7.5    11.68 )-----------( 184      ( 07 Sep 2023 10:23 )             24.0     09-07    127<L>  |  91<L>  |  96<H>  ----------------------------<  239<H>  5.3   |  20<L>  |  3.29<H>    Ca    8.5      07 Sep 2023 06:00  Phos  5.6     09-07  Mg     2.50     09-07        Urinalysis Basic - ( 07 Sep 2023 06:00 )  Color: x / Appearance: x / SG: x / pH: x  Gluc: 239 mg/dL / Ketone: x  / Bili: x / Urobili: x   Blood: x / Protein: x / Nitrite: x   Leuk Esterase: x / RBC: x / WBC x   Sq Epi: x / Non Sq Epi: x / Bacteria: x        Venous Blood Gas:  09-08 @ 08:00  7.24/61/55/26/90.6  VBG Lactate: 1.0      PAST MEDICAL & SURGICAL HISTORY:  Breast CA      Diabetes      Stroke      Cardiac arrest      HTN (hypertension)      H/O mastectomy, bilateral          FAMILY HISTORY:  No pertinent family history in first degree relatives        Social History:      RADIOLOGY:  [ ] Reviewed and interpreted by me    PULMONARY FUNCTION TESTS:    EKG: CHIEF COMPLAINT: Patient is a 73y old  Female who presents with a chief complaint of Respiratory distress (07 Sep 2023 19:44)      INTERVAL EVENTS:  - Fever spike TMAX 100.5F overnight and pending RPT cultures with dopplers and CTs.   - Continue on ana cristina, vanco by dose (dosed yesterday) and ciproHC to left ear for now.   - High PIPs with respiratory acidosis likely from volume overload and attempting bumex.   - Lethargy likely from respiratory acidosis however family notes head and body shaking. Prior EEG negative however will check prolactin and repeat spot EEG .     REVIEW OF SYSTEMS: Seen by bedside during AM rounds and unable to obtain ROS second to poor mentation/ lethargy.     Mode: AC/ CMV (Assist Control/ Continuous Mandatory Ventilation), RR (machine): 12, TV (machine): 360, FiO2: 40, PEEP: 5, ITime: 0.81, MAP: 11, PIP: 27      OBJECTIVE:  ICU Vital Signs Last 24 Hrs  T(C): 38.1 (08 Sep 2023 05:35), Max: 38.1 (08 Sep 2023 05:35)  T(F): 100.5 (08 Sep 2023 05:35), Max: 100.5 (08 Sep 2023 05:35)  HR: 92 (08 Sep 2023 07:32) (14 - 106)  BP: 137/54 (08 Sep 2023 05:35) (112/51 - 158/51)  BP(mean): 75 (08 Sep 2023 05:35) (66 - 75)  ABP: --  ABP(mean): --  RR: 15 (08 Sep 2023 05:35) (12 - 20)  SpO2: 96% (08 Sep 2023 07:32) (95% - 100%)    O2 Parameters below as of 08 Sep 2023 07:32  Patient On (Oxygen Delivery Method): conventional ventilator          Mode: AC/ CMV (Assist Control/ Continuous Mandatory Ventilation), RR (machine): 12, TV (machine): 360, FiO2: 40, PEEP: 5, ITime: 0.81, MAP: 11, PIP: 27    09-07 @ 07:01  -  09-08 @ 07:00  --------------------------------------------------------  IN: 1330 mL / OUT: 1400 mL / NET: -70 mL      CAPILLARY BLOOD GLUCOSE  POCT Blood Glucose.: 179 mg/dL (08 Sep 2023 05:41)      HOSPITAL MEDICATIONS:  MEDICATIONS  (STANDING):  albuterol/ipratropium for Nebulization 3 milliLiter(s) Nebulizer every 6 hours  atorvastatin 40 milliGRAM(s) Oral at bedtime  chlorhexidine 0.12% Liquid 15 milliLiter(s) Oral Mucosa every 12 hours  chlorhexidine 2% Cloths 1 Application(s) Topical daily  ciprofloxacin/hydrocortisone Suspension Otic 4 Drop(s) Left Ear two times a day  dextrose 5%. 1000 milliLiter(s) (50 mL/Hr) IV Continuous <Continuous>  dextrose 5%. 1000 milliLiter(s) (100 mL/Hr) IV Continuous <Continuous>  dextrose 50% Injectable 12.5 Gram(s) IV Push once  dextrose 50% Injectable 25 Gram(s) IV Push once  dextrose 50% Injectable 25 Gram(s) IV Push once  doxazosin 6 milliGRAM(s) Oral <User Schedule>  FIRST- Mouthwash  BLM 10 milliLiter(s) Swish and Spit four times a day  glucagon  Injectable 1 milliGRAM(s) IntraMuscular once  insulin lispro (ADMELOG) corrective regimen sliding scale   SubCutaneous every 6 hours  insulin NPH human recombinant 3 Unit(s) SubCutaneous every 6 hours  meropenem  IVPB 500 milliGRAM(s) IV Intermittent every 12 hours  pantoprazole  Injectable 40 milliGRAM(s) IV Push two times a day  predniSONE   Tablet 20 milliGRAM(s) Oral daily  sevelamer carbonate Powder 1600 milliGRAM(s) Oral three times a day  sodium chloride 3%  Inhalation 4 milliLiter(s) Inhalation every 6 hours    MEDICATIONS  (PRN):  acetaminophen   Oral Liquid .. 650 milliGRAM(s) Oral every 6 hours PRN Temp greater or equal to 38C (100.4F), Mild Pain (1 - 3)  dextrose Oral Gel 15 Gram(s) Oral once PRN Blood Glucose LESS THAN 70 milliGRAM(s)/deciliter      PHYSICAL EXAMINATION  General: NAD   HEENT: Tracheostomy present with no leak and NGT present.   Cards: S1/S2, no murmurs   Pulm: Course vent sounds bilaterally with expiratory wheezing.   Abdomen: Soft, NTND. BS (+)   Extremities: 2+ pitting edema to BL LE with generalized anasarca. No AROM x 2   Neurology: Eyes closed and only grimaces to pain with no focal neurological deficits     LABS:                        7.5    11.68 )-----------( 184      ( 07 Sep 2023 10:23 )             24.0     09-07    127<L>  |  91<L>  |  96<H>  ----------------------------<  239<H>  5.3   |  20<L>  |  3.29<H>    Ca    8.5      07 Sep 2023 06:00  Phos  5.6     09-07  Mg     2.50     09-07        Urinalysis Basic - ( 07 Sep 2023 06:00 )  Color: x / Appearance: x / SG: x / pH: x  Gluc: 239 mg/dL / Ketone: x  / Bili: x / Urobili: x   Blood: x / Protein: x / Nitrite: x   Leuk Esterase: x / RBC: x / WBC x   Sq Epi: x / Non Sq Epi: x / Bacteria: x        Venous Blood Gas:  09-08 @ 08:00  7.24/61/55/26/90.6  VBG Lactate: 1.0      PAST MEDICAL & SURGICAL HISTORY:  Breast CA      Diabetes      Stroke      Cardiac arrest      HTN (hypertension)      H/O mastectomy, bilateral          FAMILY HISTORY:  No pertinent family history in first degree relatives        Social History:      RADIOLOGY:  [ ] Reviewed and interpreted by me    PULMONARY FUNCTION TESTS:    EKG: CHIEF COMPLAINT: Patient is a 73y old  Female who presents with a chief complaint of Respiratory distress (07 Sep 2023 19:44)      INTERVAL EVENTS:  - Fever spike TMAX 100.5F overnight and pending RPT cultures with dopplers and CTs.   - Continue on ana cristina, vanco by dose (dosed yesterday) and ciproHC to left ear for now.   - High PIPs with respiratory acidosis likely from volume overload and attempting bumex.   - Lethargy likely from respiratory acidosis however family notes head and body shaking. Prior EEG negative however will check prolactin and repeat spot EEG .     REVIEW OF SYSTEMS: Seen by bedside during AM rounds and unable to obtain ROS second to poor mentation/ lethargy.     Mode: AC/ CMV (Assist Control/ Continuous Mandatory Ventilation), RR (machine): 12, TV (machine): 360, FiO2: 40, PEEP: 5, ITime: 0.81, MAP: 11, PIP: 27      OBJECTIVE:  ICU Vital Signs Last 24 Hrs  T(C): 38.1 (08 Sep 2023 05:35), Max: 38.1 (08 Sep 2023 05:35)  T(F): 100.5 (08 Sep 2023 05:35), Max: 100.5 (08 Sep 2023 05:35)  HR: 92 (08 Sep 2023 07:32) (14 - 106)  BP: 137/54 (08 Sep 2023 05:35) (112/51 - 158/51)  BP(mean): 75 (08 Sep 2023 05:35) (66 - 75)  ABP: --  ABP(mean): --  RR: 15 (08 Sep 2023 05:35) (12 - 20)  SpO2: 96% (08 Sep 2023 07:32) (95% - 100%)    O2 Parameters below as of 08 Sep 2023 07:32  Patient On (Oxygen Delivery Method): conventional ventilator          Mode: AC/ CMV (Assist Control/ Continuous Mandatory Ventilation), RR (machine): 12, TV (machine): 360, FiO2: 40, PEEP: 5, ITime: 0.81, MAP: 11, PIP: 27    09-07 @ 07:01  -  09-08 @ 07:00  --------------------------------------------------------  IN: 1330 mL / OUT: 1400 mL / NET: -70 mL      CAPILLARY BLOOD GLUCOSE  POCT Blood Glucose.: 179 mg/dL (08 Sep 2023 05:41)      HOSPITAL MEDICATIONS:  MEDICATIONS  (STANDING):  albuterol/ipratropium for Nebulization 3 milliLiter(s) Nebulizer every 6 hours  atorvastatin 40 milliGRAM(s) Oral at bedtime  chlorhexidine 0.12% Liquid 15 milliLiter(s) Oral Mucosa every 12 hours  chlorhexidine 2% Cloths 1 Application(s) Topical daily  ciprofloxacin/hydrocortisone Suspension Otic 4 Drop(s) Left Ear two times a day  dextrose 5%. 1000 milliLiter(s) (50 mL/Hr) IV Continuous <Continuous>  dextrose 5%. 1000 milliLiter(s) (100 mL/Hr) IV Continuous <Continuous>  dextrose 50% Injectable 12.5 Gram(s) IV Push once  dextrose 50% Injectable 25 Gram(s) IV Push once  dextrose 50% Injectable 25 Gram(s) IV Push once  doxazosin 6 milliGRAM(s) Oral <User Schedule>  FIRST- Mouthwash  BLM 10 milliLiter(s) Swish and Spit four times a day  glucagon  Injectable 1 milliGRAM(s) IntraMuscular once  insulin lispro (ADMELOG) corrective regimen sliding scale   SubCutaneous every 6 hours  insulin NPH human recombinant 3 Unit(s) SubCutaneous every 6 hours  meropenem  IVPB 500 milliGRAM(s) IV Intermittent every 12 hours  pantoprazole  Injectable 40 milliGRAM(s) IV Push two times a day  predniSONE   Tablet 20 milliGRAM(s) Oral daily  sevelamer carbonate Powder 1600 milliGRAM(s) Oral three times a day  sodium chloride 3%  Inhalation 4 milliLiter(s) Inhalation every 6 hours    MEDICATIONS  (PRN):  acetaminophen   Oral Liquid .. 650 milliGRAM(s) Oral every 6 hours PRN Temp greater or equal to 38C (100.4F), Mild Pain (1 - 3)  dextrose Oral Gel 15 Gram(s) Oral once PRN Blood Glucose LESS THAN 70 milliGRAM(s)/deciliter      PHYSICAL EXAMINATION  General: NAD   HEENT: Tracheostomy present with no leak and NGT present.   Cards: S1/S2, no murmurs   Pulm: Course vent sounds bilaterally with expiratory wheezing.   Abdomen: Soft, NTND. BS (+)   Extremities: Generalized anasarca. No AROM x 2   Neurology: Eyes closed and only grimaces to pain with no focal neurological deficits     LABS:                        7.5    11.68 )-----------( 184      ( 07 Sep 2023 10:23 )             24.0     09-07    127<L>  |  91<L>  |  96<H>  ----------------------------<  239<H>  5.3   |  20<L>  |  3.29<H>    Ca    8.5      07 Sep 2023 06:00  Phos  5.6     09-07  Mg     2.50     09-07        Urinalysis Basic - ( 07 Sep 2023 06:00 )  Color: x / Appearance: x / SG: x / pH: x  Gluc: 239 mg/dL / Ketone: x  / Bili: x / Urobili: x   Blood: x / Protein: x / Nitrite: x   Leuk Esterase: x / RBC: x / WBC x   Sq Epi: x / Non Sq Epi: x / Bacteria: x        Venous Blood Gas:  09-08 @ 08:00  7.24/61/55/26/90.6  VBG Lactate: 1.0      PAST MEDICAL & SURGICAL HISTORY:  Breast CA      Diabetes      Stroke      Cardiac arrest      HTN (hypertension)      H/O mastectomy, bilateral          FAMILY HISTORY:  No pertinent family history in first degree relatives        Social History:      RADIOLOGY:  [ ] Reviewed and interpreted by me    PULMONARY FUNCTION TESTS:    EKG: CHIEF COMPLAINT: Patient is a 73y old  Female who presents with a chief complaint of Respiratory distress (07 Sep 2023 19:44)      INTERVAL EVENTS:  - Fever spike TMAX 100.5F overnight and pending RPT cultures with dopplers and CTs.   - Continue on ana cristina, vanco by dose (dosed yesterday) and ciproHC to left ear for now.   - High PIPs with respiratory acidosis likely from volume overload and attempting bumex.   - Lethargy likely from respiratory acidosis however family notes head and body shaking. Prior EEG negative however will check prolactin and repeat spot EEG .     REVIEW OF SYSTEMS: Seen by bedside during AM rounds and unable to obtain ROS second to poor mentation/ lethargy.     Mode: AC/ CMV (Assist Control/ Continuous Mandatory Ventilation), RR (machine): 12, TV (machine): 360, FiO2: 40, PEEP: 5, ITime: 0.81, MAP: 11, PIP: 27      OBJECTIVE:  ICU Vital Signs Last 24 Hrs  T(C): 38.1 (08 Sep 2023 05:35), Max: 38.1 (08 Sep 2023 05:35)  T(F): 100.5 (08 Sep 2023 05:35), Max: 100.5 (08 Sep 2023 05:35)  HR: 92 (08 Sep 2023 07:32) (14 - 106)  BP: 137/54 (08 Sep 2023 05:35) (112/51 - 158/51)  BP(mean): 75 (08 Sep 2023 05:35) (66 - 75)  ABP: --  ABP(mean): --  RR: 15 (08 Sep 2023 05:35) (12 - 20)  SpO2: 96% (08 Sep 2023 07:32) (95% - 100%)    O2 Parameters below as of 08 Sep 2023 07:32  Patient On (Oxygen Delivery Method): conventional ventilator          Mode: AC/ CMV (Assist Control/ Continuous Mandatory Ventilation), RR (machine): 12, TV (machine): 360, FiO2: 40, PEEP: 5, ITime: 0.81, MAP: 11, PIP: 27    09-07 @ 07:01  -  09-08 @ 07:00  --------------------------------------------------------  IN: 1330 mL / OUT: 1400 mL / NET: -70 mL      CAPILLARY BLOOD GLUCOSE  POCT Blood Glucose.: 179 mg/dL (08 Sep 2023 05:41)      HOSPITAL MEDICATIONS:  MEDICATIONS  (STANDING):  albuterol/ipratropium for Nebulization 3 milliLiter(s) Nebulizer every 6 hours  atorvastatin 40 milliGRAM(s) Oral at bedtime  chlorhexidine 0.12% Liquid 15 milliLiter(s) Oral Mucosa every 12 hours  chlorhexidine 2% Cloths 1 Application(s) Topical daily  ciprofloxacin/hydrocortisone Suspension Otic 4 Drop(s) Left Ear two times a day  dextrose 5%. 1000 milliLiter(s) (50 mL/Hr) IV Continuous <Continuous>  dextrose 5%. 1000 milliLiter(s) (100 mL/Hr) IV Continuous <Continuous>  dextrose 50% Injectable 12.5 Gram(s) IV Push once  dextrose 50% Injectable 25 Gram(s) IV Push once  dextrose 50% Injectable 25 Gram(s) IV Push once  doxazosin 6 milliGRAM(s) Oral <User Schedule>  FIRST- Mouthwash  BLM 10 milliLiter(s) Swish and Spit four times a day  glucagon  Injectable 1 milliGRAM(s) IntraMuscular once  insulin lispro (ADMELOG) corrective regimen sliding scale   SubCutaneous every 6 hours  insulin NPH human recombinant 3 Unit(s) SubCutaneous every 6 hours  meropenem  IVPB 500 milliGRAM(s) IV Intermittent every 12 hours  pantoprazole  Injectable 40 milliGRAM(s) IV Push two times a day  predniSONE   Tablet 20 milliGRAM(s) Oral daily  sevelamer carbonate Powder 1600 milliGRAM(s) Oral three times a day  sodium chloride 3%  Inhalation 4 milliLiter(s) Inhalation every 6 hours    MEDICATIONS  (PRN):  acetaminophen   Oral Liquid .. 650 milliGRAM(s) Oral every 6 hours PRN Temp greater or equal to 38C (100.4F), Mild Pain (1 - 3)  dextrose Oral Gel 15 Gram(s) Oral once PRN Blood Glucose LESS THAN 70 milliGRAM(s)/deciliter      PHYSICAL EXAMINATION  General: NAD   HEENT: Tracheostomy present with no leak and NGT present.   Cards: S1/S2, no murmurs   Pulm: Course vent sounds bilaterally with expiratory wheezing.   Abdomen: Soft, NTND. BS (+)   Extremities: Generalized anasarca. No AROM x 4   Neurology: Eyes closed and only grimaces to pain with no focal neurological deficits     LABS:                        7.5    11.68 )-----------( 184      ( 07 Sep 2023 10:23 )             24.0     09-07    127<L>  |  91<L>  |  96<H>  ----------------------------<  239<H>  5.3   |  20<L>  |  3.29<H>    Ca    8.5      07 Sep 2023 06:00  Phos  5.6     09-07  Mg     2.50     09-07        Urinalysis Basic - ( 07 Sep 2023 06:00 )  Color: x / Appearance: x / SG: x / pH: x  Gluc: 239 mg/dL / Ketone: x  / Bili: x / Urobili: x   Blood: x / Protein: x / Nitrite: x   Leuk Esterase: x / RBC: x / WBC x   Sq Epi: x / Non Sq Epi: x / Bacteria: x        Venous Blood Gas:  09-08 @ 08:00  7.24/61/55/26/90.6  VBG Lactate: 1.0      PAST MEDICAL & SURGICAL HISTORY:  Breast CA      Diabetes      Stroke      Cardiac arrest      HTN (hypertension)      H/O mastectomy, bilateral          FAMILY HISTORY:  No pertinent family history in first degree relatives        Social History:      RADIOLOGY:  [ ] Reviewed and interpreted by me    PULMONARY FUNCTION TESTS:    EKG:

## 2023-09-08 NOTE — CONSULT NOTE ADULT - NSCONSULTADDITIONALINFOA_GEN_ALL_CORE
Took culture for cytologic review. Results pending.   Recommendation: Magic Mouthwash for comfort.   Will follow-up after results obtained.
Plan:   (1) Discussed results of cytology culture and the noninfectious, benign nature of patient's oral condition with . Answered 's questions.   (2) Manage symptoms, palliative.   (3) If oral condition worsens, contact us- oral pathology    Resident: Dr. Victoria ([place]) VELMA Lora  Attending: Dr. Rubio Ulrich DDS
yes

## 2023-09-08 NOTE — PHYSICAL THERAPY INITIAL EVALUATION ADULT - NSPTDISCHREC_GEN_A_CORE
pt. not placed on skilled PT services at this time secondary to Pt unable to follow commands to actively participate in PT services. Please reconsult if appropriate/feasible. Pt also appears close to baseline as Pt has been non-ambulatory for 1 year.
pt. not placed on skilled PT services at this time secondary to pt. presenting with lethargy and unable to actively participate in PT services. Please reconsult if appropriate/feasible. Anticipated discharge home, patient currently not a rehab candidate.

## 2023-09-09 NOTE — PROGRESS NOTE ADULT - SUBJECTIVE AND OBJECTIVE BOX
ENT Progress Note  Interval:  Patient seen and evaluated at bedside. Wick removed, ear debrided, and wick replaced. FSGs have been elevated.       PAST MEDICAL & SURGICAL HISTORY:  Breast CA      Diabetes      Stroke      Cardiac arrest      HTN (hypertension)      H/O mastectomy, bilateral        Allergies    isoniazid (Rash)  nafcillin (Unknown)  hydrALAZINE (Rash)  vitamin E (Short breath; Urticaria; Hives)  doxycycline (Rash)  cefepime (Rash)  NIFEdipine (Urticaria; Hives)    Intolerances      MEDICATIONS  (STANDING):  albuterol/ipratropium for Nebulization 3 milliLiter(s) Nebulizer every 6 hours  atorvastatin 40 milliGRAM(s) Oral at bedtime  chlorhexidine 0.12% Liquid 15 milliLiter(s) Oral Mucosa every 12 hours  chlorhexidine 2% Cloths 1 Application(s) Topical daily  ciprofloxacin/hydrocortisone Suspension Otic 4 Drop(s) Left Ear two times a day  dextrose 5%. 1000 milliLiter(s) (50 mL/Hr) IV Continuous <Continuous>  dextrose 5%. 1000 milliLiter(s) (100 mL/Hr) IV Continuous <Continuous>  dextrose 50% Injectable 12.5 Gram(s) IV Push once  dextrose 50% Injectable 25 Gram(s) IV Push once  dextrose 50% Injectable 25 Gram(s) IV Push once  doxazosin 6 milliGRAM(s) Oral <User Schedule>  ferrous    sulfate Liquid 300 milliGRAM(s) Enteral Tube daily  FIRST- Mouthwash  BLM 10 milliLiter(s) Swish and Spit four times a day  glucagon  Injectable 1 milliGRAM(s) IntraMuscular once  insulin lispro (ADMELOG) corrective regimen sliding scale   SubCutaneous every 6 hours  insulin NPH human recombinant 3 Unit(s) SubCutaneous every 6 hours  meropenem  IVPB 500 milliGRAM(s) IV Intermittent every 12 hours  pantoprazole  Injectable 40 milliGRAM(s) IV Push two times a day  predniSONE   Tablet 20 milliGRAM(s) Oral daily  predniSONE   Tablet   Oral   sevelamer carbonate Powder 1600 milliGRAM(s) Oral three times a day  sodium chloride 3%  Inhalation 4 milliLiter(s) Inhalation every 6 hours    MEDICATIONS  (PRN):  acetaminophen   Oral Liquid .. 650 milliGRAM(s) Oral every 6 hours PRN Temp greater or equal to 38C (100.4F), Mild Pain (1 - 3)  dextrose Oral Gel 15 Gram(s) Oral once PRN Blood Glucose LESS THAN 70 milliGRAM(s)/deciliter        Vital Signs Last 24 Hrs  T(C): 38.1 (09 Sep 2023 06:31), Max: 38.2 (09 Sep 2023 05:40)  T(F): 100.6 (09 Sep 2023 06:31), Max: 100.8 (09 Sep 2023 05:40)  HR: 101 (09 Sep 2023 11:13) (86 - 113)  BP: 103/33 (09 Sep 2023 06:31) (103/33 - 157/60)  BP(mean): --  RR: 18 (09 Sep 2023 06:31) (18 - 20)  SpO2: 99% (09 Sep 2023 11:13) (95% - 100%)    Parameters below as of 09 Sep 2023 09:48  Patient On (Oxygen Delivery Method): ventilator        Physical Exam:  Gen: NAD  Skin: No rashes, bruises, or lesions  Head: Normocephalic, Atraumatic  Face: no edema, erythema, or fluctuance. Parotid glands soft without mass  Eyes: no scleral injection  Ears: Right - ear canal clear, TM intact without effusion or erythema. No evidence of any fluid drainage. No mastoid tenderness, erythema, or ear bulging            Left - pinna erythematous with dry crusting, removed at bedside, EAC edema improving, difficult to visualize TM, debris and infected tissue cleaned, ear wick replaced  Neck: trach noted   Lymphatic: No lymphadenopathy  Resp: breathing easily, no stridor      09-08-23 @ 07:01  -  09-09-23 @ 07:00  --------------------------------------------------------  IN: 940 mL / OUT: 100 mL / NET: 840 mL                              6.0    9.99  )-----------( 148      ( 09 Sep 2023 07:50 )             19.0    09-09    133<L>  |  97<L>  |  46<H>  ----------------------------<  149<H>  3.6   |  28  |  1.57<H>    Ca    7.8<L>      09 Sep 2023 07:50  Phos  2.3     09-09  Mg     2.10     09-09    TPro  5.7<L>  /  Alb  2.2<L>  /  TBili  0.2  /  DBili  <0.2  /  AST  24  /  ALT  14  /  AlkPhos  200<H>  09-08     Culture - Sputum (collected 09-08-23 @ 11:38)  Source: .Sputum Sputum  Gram Stain (09-08-23 @ 23:15):    Numerous polymorphonuclear leukocytes per low power field    Numerous Squamous epithelial cells per low power field    Few Yeast like cells per oil power field    Results consistent with oropharyngeal contamination          A/P: 73yFemale DM, CVA, HTN admited for resp failure w/trach, bacteremia p/w L OE 9/5. Requiring serial debridements, wick replaced today.  - please do not keep dry gauze on pinna, if pus pouring out from ear canal, please send for culture  - FSG goal <150  - Continue with  broad spectrum antibiotics- now on meropenem , ciprodex x 10d  - Recommend CT scan of Temporal bone to better assess and ensure no bony destruction/ involvement.

## 2023-09-09 NOTE — PROGRESS NOTE ADULT - NS ATTEND AMEND GEN_ALL_CORE FT
agree with above    already on ana cristina and vanco (HD).  will need   possible drainage of the right pleural fluid

## 2023-09-09 NOTE — PROGRESS NOTE ADULT - SUBJECTIVE AND OBJECTIVE BOX
CHIEF COMPLAINT: Patient is a 73y old  Female who presents with a chief complaint of Respiratory distress (09 Sep 2023 06:44)      INTERVAL EVENTS:    REVIEW OF SYSTEMS: Seen by bedside during AM rounds and     Mode: AC/ CMV (Assist Control/ Continuous Mandatory Ventilation), RR (machine): 20, TV (machine): 300, FiO2: 40, PEEP: 5, ITime: 0.83, MAP: 13, PIP: 38  Arterial Blood Gas:  09-08 @ 18:30  7.28/55/76/26/97.0/-1.3  ABG lactate: --      OBJECTIVE:  ICU Vital Signs Last 24 Hrs  T(C): 38.1 (09 Sep 2023 06:31), Max: 38.2 (09 Sep 2023 05:40)  T(F): 100.6 (09 Sep 2023 06:31), Max: 100.8 (09 Sep 2023 05:40)  HR: 103 (09 Sep 2023 07:17) (86 - 113)  BP: 103/33 (09 Sep 2023 06:31) (103/33 - 157/60)  BP(mean): --  ABP: --  ABP(mean): --  RR: 18 (09 Sep 2023 06:31) (18 - 20)  SpO2: 97% (09 Sep 2023 07:17) (95% - 100%)    O2 Parameters below as of 09 Sep 2023 06:31  Patient On (Oxygen Delivery Method): ventilator    O2 Concentration (%): 40      Mode: AC/ CMV (Assist Control/ Continuous Mandatory Ventilation), RR (machine): 20, TV (machine): 300, FiO2: 40, PEEP: 5, ITime: 0.83, MAP: 13, PIP: 38    09-08 @ 07:01  -  09-09 @ 07:00  --------------------------------------------------------  IN: 940 mL / OUT: 100 mL / NET: 840 mL      CAPILLARY BLOOD GLUCOSE  POCT Blood Glucose.: 179 mg/dL (09 Sep 2023 05:42)      HOSPITAL MEDICATIONS:  MEDICATIONS  (STANDING):  albuterol/ipratropium for Nebulization 3 milliLiter(s) Nebulizer every 6 hours  atorvastatin 40 milliGRAM(s) Oral at bedtime  chlorhexidine 0.12% Liquid 15 milliLiter(s) Oral Mucosa every 12 hours  chlorhexidine 2% Cloths 1 Application(s) Topical daily  ciprofloxacin/hydrocortisone Suspension Otic 4 Drop(s) Left Ear two times a day  dextrose 5%. 1000 milliLiter(s) (50 mL/Hr) IV Continuous <Continuous>  dextrose 5%. 1000 milliLiter(s) (100 mL/Hr) IV Continuous <Continuous>  dextrose 50% Injectable 12.5 Gram(s) IV Push once  dextrose 50% Injectable 25 Gram(s) IV Push once  dextrose 50% Injectable 25 Gram(s) IV Push once  doxazosin 6 milliGRAM(s) Oral <User Schedule>  FIRST- Mouthwash  BLM 10 milliLiter(s) Swish and Spit four times a day  glucagon  Injectable 1 milliGRAM(s) IntraMuscular once  insulin lispro (ADMELOG) corrective regimen sliding scale   SubCutaneous every 6 hours  insulin NPH human recombinant 3 Unit(s) SubCutaneous every 6 hours  meropenem  IVPB 500 milliGRAM(s) IV Intermittent every 12 hours  pantoprazole  Injectable 40 milliGRAM(s) IV Push two times a day  predniSONE   Tablet 20 milliGRAM(s) Oral daily  predniSONE   Tablet   Oral   sevelamer carbonate Powder 1600 milliGRAM(s) Oral three times a day  sodium chloride 3%  Inhalation 4 milliLiter(s) Inhalation every 6 hours    MEDICATIONS  (PRN):  acetaminophen   Oral Liquid .. 650 milliGRAM(s) Oral every 6 hours PRN Temp greater or equal to 38C (100.4F), Mild Pain (1 - 3)  dextrose Oral Gel 15 Gram(s) Oral once PRN Blood Glucose LESS THAN 70 milliGRAM(s)/deciliter      PHYSICAL EXAMINATION    LABS:                        8.5    10.96 )-----------( 179      ( 08 Sep 2023 17:39 )             27.1     09-08    131<L>  |  97<L>  |  74<H>  ----------------------------<  228<H>  5.1   |  24  |  2.67<H>    Ca    7.9<L>      08 Sep 2023 18:30  Phos  4.7     09-08  Mg     2.40     09-08    TPro  5.7<L>  /  Alb  2.2<L>  /  TBili  0.2  /  DBili  <0.2  /  AST  24  /  ALT  14  /  AlkPhos  200<H>  09-08      Urinalysis Basic - ( 08 Sep 2023 18:30 )  Color: x / Appearance: x / SG: x / pH: x  Gluc: 228 mg/dL / Ketone: x  / Bili: x / Urobili: x   Blood: x / Protein: x / Nitrite: x   Leuk Esterase: x / RBC: x / WBC x   Sq Epi: x / Non Sq Epi: x / Bacteria: x      Arterial Blood Gas:  09-08 @ 18:30  7.28/55/76/26/97.0/-1.3  ABG lactate: --    Venous Blood Gas:  09-08 @ 17:39  7.23/64/38/27/71.7  VBG Lactate: 0.9  Venous Blood Gas:  09-08 @ 08:00  7.24/61/55/26/90.6  VBG Lactate: 1.0      PAST MEDICAL & SURGICAL HISTORY:  Breast CA      Diabetes      Stroke      Cardiac arrest      HTN (hypertension)      H/O mastectomy, bilateral          FAMILY HISTORY:  No pertinent family history in first degree relatives        Social History:      RADIOLOGY:  [ ] Reviewed and interpreted by me    PULMONARY FUNCTION TESTS:    EKG: CHIEF COMPLAINT: Patient is a 73y old  Female who presents with a chief complaint of Respiratory distress (09 Sep 2023 06:44)      INTERVAL EVENTS:  - Fever spike TMAX 100.8F overnight   - 100cc UOP and 1 incontinence episode overnight with bumex push, and PIPs this morning remains in 40s. Case discussed at length with renal and given POCUS findings of volume overload will attempt aggressive fluid removal today.   - CT CHEST performed overnight with TBM likely causing refractory hypercarbia and PEEP increased to 8.   - Continue on ABX.     REVIEW OF SYSTEMS: Seen by bedside during AM rounds and unable to assess as eyes closed/ lethargic    Mode: AC/ CMV (Assist Control/ Continuous Mandatory Ventilation), RR (machine): 20, TV (machine): 300, FiO2: 40, PEEP: 5, ITime: 0.83, MAP: 13, PIP: 38  Arterial Blood Gas:  09-08 @ 18:30  7.28/55/76/26/97.0/-1.3  ABG lactate: --      OBJECTIVE:  ICU Vital Signs Last 24 Hrs  T(C): 38.1 (09 Sep 2023 06:31), Max: 38.2 (09 Sep 2023 05:40)  T(F): 100.6 (09 Sep 2023 06:31), Max: 100.8 (09 Sep 2023 05:40)  HR: 103 (09 Sep 2023 07:17) (86 - 113)  BP: 103/33 (09 Sep 2023 06:31) (103/33 - 157/60)  BP(mean): --  ABP: --  ABP(mean): --  RR: 18 (09 Sep 2023 06:31) (18 - 20)  SpO2: 97% (09 Sep 2023 07:17) (95% - 100%)    O2 Parameters below as of 09 Sep 2023 06:31  Patient On (Oxygen Delivery Method): ventilator    O2 Concentration (%): 40      Mode: AC/ CMV (Assist Control/ Continuous Mandatory Ventilation), RR (machine): 20, TV (machine): 300, FiO2: 40, PEEP: 5, ITime: 0.83, MAP: 13, PIP: 38    09-08 @ 07:01  -  09-09 @ 07:00  --------------------------------------------------------  IN: 940 mL / OUT: 100 mL / NET: 840 mL      CAPILLARY BLOOD GLUCOSE  POCT Blood Glucose.: 179 mg/dL (09 Sep 2023 05:42)      HOSPITAL MEDICATIONS:  MEDICATIONS  (STANDING):  albuterol/ipratropium for Nebulization 3 milliLiter(s) Nebulizer every 6 hours  atorvastatin 40 milliGRAM(s) Oral at bedtime  chlorhexidine 0.12% Liquid 15 milliLiter(s) Oral Mucosa every 12 hours  chlorhexidine 2% Cloths 1 Application(s) Topical daily  ciprofloxacin/hydrocortisone Suspension Otic 4 Drop(s) Left Ear two times a day  dextrose 5%. 1000 milliLiter(s) (50 mL/Hr) IV Continuous <Continuous>  dextrose 5%. 1000 milliLiter(s) (100 mL/Hr) IV Continuous <Continuous>  dextrose 50% Injectable 12.5 Gram(s) IV Push once  dextrose 50% Injectable 25 Gram(s) IV Push once  dextrose 50% Injectable 25 Gram(s) IV Push once  doxazosin 6 milliGRAM(s) Oral <User Schedule>  FIRST- Mouthwash  BLM 10 milliLiter(s) Swish and Spit four times a day  glucagon  Injectable 1 milliGRAM(s) IntraMuscular once  insulin lispro (ADMELOG) corrective regimen sliding scale   SubCutaneous every 6 hours  insulin NPH human recombinant 3 Unit(s) SubCutaneous every 6 hours  meropenem  IVPB 500 milliGRAM(s) IV Intermittent every 12 hours  pantoprazole  Injectable 40 milliGRAM(s) IV Push two times a day  predniSONE   Tablet 20 milliGRAM(s) Oral daily  predniSONE   Tablet   Oral   sevelamer carbonate Powder 1600 milliGRAM(s) Oral three times a day  sodium chloride 3%  Inhalation 4 milliLiter(s) Inhalation every 6 hours    MEDICATIONS  (PRN):  acetaminophen   Oral Liquid .. 650 milliGRAM(s) Oral every 6 hours PRN Temp greater or equal to 38C (100.4F), Mild Pain (1 - 3)  dextrose Oral Gel 15 Gram(s) Oral once PRN Blood Glucose LESS THAN 70 milliGRAM(s)/deciliter      PHYSICAL EXAMINATION  General: NAD   HEENT: Tracheostomy present with no leak and 2 sutures remains. NGT present.   Cards: S1/S2, no murmurs   Pulm: Course vent sounds bilaterally crackles and expiratory wheezes   Abdomen: Soft, NTND. BS (+)   Extremities: Generalized anasarca. No AROM x 4   Neurology: Eyes closed and only grimaces to pain with no focal neurological deficits     LABS:                        8.5    10.96 )-----------( 179      ( 08 Sep 2023 17:39 )             27.1     09-08    131<L>  |  97<L>  |  74<H>  ----------------------------<  228<H>  5.1   |  24  |  2.67<H>    Ca    7.9<L>      08 Sep 2023 18:30  Phos  4.7     09-08  Mg     2.40     09-08    TPro  5.7<L>  /  Alb  2.2<L>  /  TBili  0.2  /  DBili  <0.2  /  AST  24  /  ALT  14  /  AlkPhos  200<H>  09-08      Urinalysis Basic - ( 08 Sep 2023 18:30 )  Color: x / Appearance: x / SG: x / pH: x  Gluc: 228 mg/dL / Ketone: x  / Bili: x / Urobili: x   Blood: x / Protein: x / Nitrite: x   Leuk Esterase: x / RBC: x / WBC x   Sq Epi: x / Non Sq Epi: x / Bacteria: x      Arterial Blood Gas:  09-08 @ 18:30  7.28/55/76/26/97.0/-1.3  ABG lactate: --    Venous Blood Gas:  09-08 @ 17:39  7.23/64/38/27/71.7  VBG Lactate: 0.9  Venous Blood Gas:  09-08 @ 08:00  7.24/61/55/26/90.6  VBG Lactate: 1.0      PAST MEDICAL & SURGICAL HISTORY:  Breast CA      Diabetes      Stroke      Cardiac arrest      HTN (hypertension)      H/O mastectomy, bilateral          FAMILY HISTORY:  No pertinent family history in first degree relatives        Social History:      RADIOLOGY:  [ ] Reviewed and interpreted by me    PULMONARY FUNCTION TESTS:    EKG:

## 2023-09-09 NOTE — CHART NOTE - NSCHARTNOTEFT_GEN_A_CORE
:  Ac Reynolds Fellow     INDICATION:    [x] Acute Hypoxic Respiratory failure    [ ] Other:       PROCEDURE:     [x] LIMITED CHEST    [ ] LIMITED ABDOMINAL    [ ] OTHER      FINDINGS:     Pulmonary:   Left: A lines throughout. No pleural Effusion   Right: A lines anteriorly. Small to moderate simple right pleural effusion. Effusion is  posterior in location.     INTERPRETATION:  Small to moderate simple right posterior pleural effusion.    Images stored on "[employer] :  Ac Reynolds Fellow     INDICATION:    [x] Acute Hypoxic Respiratory failure    [ ] Other:       PROCEDURE:     [x] LIMITED CHEST    [ ] LIMITED ABDOMINAL    [ ] OTHER      FINDINGS:     Pulmonary:   Left: A lines throughout. No pleural Effusion   Right: A lines anteriorly. Small to moderate simple right pleural effusion. Effusion is  posterior in location.     INTERPRETATION:  Small to moderate simple right posterior pleural effusion.    Images stored on [employer]    Attending note :  I was present for the duration of the procedure and supervised as needed.

## 2023-09-09 NOTE — PROGRESS NOTE ADULT - SUBJECTIVE AND OBJECTIVE BOX
Patient is a 73y old  Female who presents with a chief complaint of Respiratory distress (09 Sep 2023 11:27)      SUBJECTIVE / OVERNIGHT EVENTS: ptn w fever overnight to 100.8, HD today, may need shiley pulled of bacteremic, needs NECK CT as per ENT to R/O Mastoid bone involvement while having ongoing purulent DC from L ear 2/2 O. externa, getting daily wick changes    MEDICATIONS  (STANDING):  albuterol/ipratropium for Nebulization 3 milliLiter(s) Nebulizer every 6 hours  atorvastatin 40 milliGRAM(s) Oral at bedtime  chlorhexidine 0.12% Liquid 15 milliLiter(s) Oral Mucosa every 12 hours  chlorhexidine 2% Cloths 1 Application(s) Topical daily  ciprofloxacin/hydrocortisone Suspension Otic 4 Drop(s) Left Ear two times a day  dextrose 5%. 1000 milliLiter(s) (50 mL/Hr) IV Continuous <Continuous>  dextrose 5%. 1000 milliLiter(s) (100 mL/Hr) IV Continuous <Continuous>  dextrose 50% Injectable 12.5 Gram(s) IV Push once  dextrose 50% Injectable 25 Gram(s) IV Push once  dextrose 50% Injectable 25 Gram(s) IV Push once  doxazosin 6 milliGRAM(s) Oral <User Schedule>  ferrous    sulfate Liquid 300 milliGRAM(s) Enteral Tube daily  FIRST- Mouthwash  BLM 10 milliLiter(s) Swish and Spit four times a day  glucagon  Injectable 1 milliGRAM(s) IntraMuscular once  insulin lispro (ADMELOG) corrective regimen sliding scale   SubCutaneous every 6 hours  insulin NPH human recombinant 3 Unit(s) SubCutaneous every 6 hours  meropenem  IVPB 500 milliGRAM(s) IV Intermittent every 12 hours  pantoprazole  Injectable 40 milliGRAM(s) IV Push two times a day  predniSONE   Tablet 20 milliGRAM(s) Oral daily  predniSONE   Tablet   Oral   sevelamer carbonate Powder 1600 milliGRAM(s) Oral three times a day  sodium chloride 3%  Inhalation 4 milliLiter(s) Inhalation every 6 hours  vancomycin  IVPB 500 milliGRAM(s) IV Intermittent once    MEDICATIONS  (PRN):  acetaminophen   Oral Liquid .. 650 milliGRAM(s) Oral every 6 hours PRN Temp greater or equal to 38C (100.4F), Mild Pain (1 - 3)  dextrose Oral Gel 15 Gram(s) Oral once PRN Blood Glucose LESS THAN 70 milliGRAM(s)/deciliter      Vital Signs Last 24 Hrs  T(F): 99 (09-09-23 @ 10:46), Max: 100.8 (09-09-23 @ 05:40)  HR: 101 (09-09-23 @ 11:13) (86 - 113)  BP: 108/77 (09-09-23 @ 10:46) (103/33 - 157/60)  RR: 18 (09-09-23 @ 10:46) (18 - 20)  SpO2: 99% (09-09-23 @ 11:13) (95% - 100%)  Telemetry:   CAPILLARY BLOOD GLUCOSE      POCT Blood Glucose.: 192 mg/dL (09 Sep 2023 11:54)  POCT Blood Glucose.: 179 mg/dL (09 Sep 2023 05:42)  POCT Blood Glucose.: 199 mg/dL (08 Sep 2023 23:27)  POCT Blood Glucose.: 232 mg/dL (08 Sep 2023 17:01)    I&O's Summary    08 Sep 2023 07:01  -  09 Sep 2023 07:00  --------------------------------------------------------  IN: 940 mL / OUT: 100 mL / NET: 840 mL    09 Sep 2023 07:01  -  09 Sep 2023 12:44  --------------------------------------------------------  IN: 400 mL / OUT: 2800 mL / NET: -2400 mL        PHYSICAL EXAM:  GENERAL: NAD, well-developed  HEAD:  Atraumatic, Normocephalic  EYES: EOMI, PERRLA, conjunctiva and sclera clear  NECK: Supple, No JVD  CHEST/LUNG: Clear to auscultation bilaterally; No wheeze  HEART: Regular rate and rhythm; No murmurs, rubs, or gallops  ABDOMEN: Soft, Nontender, Nondistended; Bowel sounds present  EXTREMITIES:  2+ Peripheral Pulses, No clubbing, cyanosis, or edema  PSYCH: AAOx3  NEUROLOGY: non-focal  SKIN: No rashes or lesions    LABS:                        6.0    9.99  )-----------( 148      ( 09 Sep 2023 07:50 )             19.0     09-09    133<L>  |  97<L>  |  46<H>  ----------------------------<  149<H>  3.6   |  28  |  1.57<H>    Ca    7.8<L>      09 Sep 2023 07:50  Phos  2.3     09-09  Mg     2.10     09-09    TPro  5.7<L>  /  Alb  2.2<L>  /  TBili  0.2  /  DBili  <0.2  /  AST  24  /  ALT  14  /  AlkPhos  200<H>  09-08          Urinalysis Basic - ( 09 Sep 2023 07:50 )    Color: x / Appearance: x / SG: x / pH: x  Gluc: 149 mg/dL / Ketone: x  / Bili: x / Urobili: x   Blood: x / Protein: x / Nitrite: x   Leuk Esterase: x / RBC: x / WBC x   Sq Epi: x / Non Sq Epi: x / Bacteria: x        RADIOLOGY & ADDITIONAL TESTS:    Imaging Personally Reviewed:    Consultant(s) Notes Reviewed:      Care Discussed with Consultants/Other Providers:

## 2023-09-09 NOTE — PROGRESS NOTE ADULT - ASSESSMENT
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 While in MICU patient noted with worsening renal failure requiring HD initiation, CGE/ Melena s/p EGD 8/31 found with esophagitis and erosive gastropathy, new right cerebellar infarct and fevers second to ESBL COLI and MSSA VAP, MSSA bacteremia, left ear otitis externa and drug rxn to cephalosporins Course further complicated by prolonged vent time s/p tracheostomy 9/1 and transferred to RCU 9/3 completed by recurrent fevers with high PIP, respiratory acidosis and concern for volume overloaded.     NEUROLOGY  # Metabolic Encephalopath vs NEW cerebella infarct   - Baseline MS AOX3 with short term memory changes per family however noted with concern for poor mentation in ICU  - MRI (8/17) with NEW right cerebellar infarct and old left PCA/occipital infarcts  - STEPHANIE (8/17) with AV showing two linear mobile echogenic elements attached to valve leaflets consistent with Lambel's excrescence, but no TRINITY thrombus, and lambel's excrescence with uncertain clinical implication.   - EEG (8/11-8/15) with no seizures   - ILR interrogation (9/7) with SR and PACs falsely recorded as AF.   - Attempted to resume ASA for medical management but held given GIB   - Continue on lipitor for medical management   - Course complicated by questionable head and body shaking with lethagy 9/8 and pending prolactin and spot EEG.   - Lethargy more likely second to respiratory acidosis.   - Monitor mentation closely     CARDIOVASCULAR  # HTN Urgency   # Septic vs vasoplegic shock   - Labile BPs ranging in between SBP 80s-200s and attempted labetalol, however noted with hypotension and bradycardia likely from BB toxicity vs shock state?    - Holding Labetalol and Catapres   - Continue on Cardura for now   - IF able/ needed to then goal would be to resume BP regimen with Coreg in lieu of Labetalol as Labetalol is renally cleared.   - Post Coreg and if needed Catapres can then be resumed.     # Hx of HFpEF with likely ADHF with volume overload.   - ECHO (7/2023) with EF 59 with severe LVH and diastolic dysfunction   - STEPHANIE (8/18) with normal LVRVSF however noted with increased LA pressures and persistent LA septal bowing into RA.   - ECHO (8/11) with EF 60 with mild LVH, normal LVRVSF, and mild ddfxn.  - Remove volume with HD sessions, however appears grossly volume overload and will attempt additional volume removal with bumex doses.      HEENT   # Left ear OE   - ENT evaluation (9/7) with no mastoid tenderness, however found with positive swelling and excoriation to left auricle and tenderness to manipulation of auricle. Debrided crusting of auricle and EAC evaluated with edema and unable to visualize TM. EAC debrided and found with watery exudate.   - Continue on CiproHC (9/5 - )   - Continue on Bradford (9/1 - ) as below   - ENT following and ear wick change QD    # Oral Lesions with questionable Zoster vs Trauma?   - Patient with tongue pain and seen with anterior ulcerations consolidating and crusting and posterior ulceration.  - Case discussed with pathology resident and culture/ cytology sent.   - HSV negative and Path (9/6) with normal appearing epithelial cells singly and in clusters devoid of viral cytopathic effect.   - Continue on magic mouth wash for pain    RESPIRATORY  # AHHRF second to volume overload   - Hx of CKD and HFpEF presented with SOB and hypercarbia second to volume overload requiring intubation and HD initiation   - Vent weaning attempted however limited requiring tracheostomy (9/1) with IP   - Continue on vent and wean as tolerated   - Continue on duonebs and HTS   - PIPs elevated (50s) and respiratory acidosis second to secretions vs volume (POCUS with BL effusions). Vent adjusted 20/300/5/30 without improvement.   - Attempt volume removal with diuresis and discuss aggressive UF sessions tomorrow.      GI  # UGIB   - CGE x 1 episode on 8/24 and melena x 2 episodes on 8/31 likely residual blood.   - EGD (8/31) found with esophagitis and erosive gastropathy  - Continue on PPI BID through late October and then PPI QD     # Dysphagia   - NGT-TF continued   - Pending PEG however needs improvement in infectious status prior to placement.     RENAL  # Progressive CKD   - Presented volume overload and progressive RUSS on CKD (baseline CRE in 2s and mode into the 3s on admission) likely obstruction vs low flow state with shock vs AIN from drug reaction issue?  - POCUS with no signs of hydronephrosis   - Pressor support started with improvement in renal function  - Attempted steroid course in ICU without improvement in renal function   - Case discussed at length with renal and initiated on HD in ICU and now continued on HD TTHS, however remains volume overloaded.   - Patient oliguric and will attempt bumex doses to offload more fluid   - Discuss aggressive UF sessions tomorrow   - Monitor renal function and UOP     # Hyperphosphatemia   - PTH normal and elevated phos likely from renal failure  - Phos binder with Renvela started with improvement.     INFECTIOUS DISEASE  # ESBL ECOLI and MSSA VAP c/b MSSA bacteremia   - RVP/ COVID (8/11) negative   - SCx (8/11) with MSSA s/p cefepime (8/12-8/13) and then ancef (8/14) however noted with drug reaction and then changed to vanco and aztreonam (8/12-8/13) in ICU .   - Course complicated by recurrent fevers and return of MSSA with bacteremia.   - SCx (9/1) with MSSA and ESBL ECOLI   - BAL (9/1) with MSSA   - BCx (9/1) with MSSA and cleared on (9/4)   - ECHO (9/6) unable to rule out endocarditis   - Continue on Bradford (9/4 - ) and Vanco BY DOSE POST HD (9/4, 9/7 ...) with duration TBD at this time. Given inability to rule out endocarditis will discuss possible 4 wks?   - Course complicated by fever (9/7) and UA with leuks but no bacteria, however compared to prior improved, RVP/COVID negative, and RPT CXR with questionable loculated effusion?   - Pending BCx, SCx, UCx, LE DOPPLERS, and CT CHEST     # Left ear OE   - ENT evaluation (9/7) with no mastoid tenderness, however found with positive swelling and excoriation to left auricle and tenderness to manipulation of auricle. Debrided crusting of auricle and EAC evaluated with edema and unable to visualize TM. EAC debrided and found with watery exudate.   - Continue on CiproHC (9/5 - )   - Continue on Bradford (9/1 - ) as below   - Given fevers pending CT TEMPORAL BONE     # MRSA PCRs   - MRSA PCR (8/11 and 8/14) negative   - Next MRSA PCR ordered for 9/10     HEME  # Anemia second to GIB vs renal disease   - Hold chemical DVT PPX given bleeding/ waxing and waning HH   - s/p multiple units of PRBCs (8/15, 8/21, 8/28, 8/31 and 9/8)   - Monitor HH and discuss with renal for EPO, however EPO may be limited as hx of malignancy. Check anemia panel in morning.     ONC   # Hx of Breast CA   - Patient dx in 2018 and s/p BL mastectomy (radical on right) and chemo and RT.   - Supportive care.     ENDOCRINE  # IDDM2   - Continue on NPH 3U and ISS   - Monitor FS and adjust as needed     LINES/ TUBES  - R ARTHUR TAYLOR (8/19 - )     SKIN  # Drug Eruption   - Attempted cefepime (8/12) vs ancef (8/13-8/14) and then noted with drug rxn.   - Hold further cephalosporins at this time   - Continue on steroids with prednisone 40 (8/18 - 9/2) then prednisone 30 (9/3-9/7), then prednisone 20 (9/8 - 9/10), then prednisone 10mg (9/11-9/13) then turn off.     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 While in MICU patient noted with worsening renal failure requiring HD initiation, CGE/ Melena s/p EGD 8/31 found with esophagitis and erosive gastropathy, new right cerebellar infarct and fevers second to ESBL COLI and MSSA VAP, MSSA bacteremia, left ear otitis externa and drug rxn to cephalosporins Course further complicated by prolonged vent time s/p tracheostomy 9/1 and transferred to RCU 9/3 completed by recurrent fevers with high PIP, respiratory acidosis and concern for volume overloaded.     NEUROLOGY  # Metabolic Encephalopath vs NEW cerebella infarct   - Baseline MS AOX3 with short term memory changes per family however noted with concern for poor mentation in ICU  - MRI (8/17) with NEW right cerebellar infarct and old left PCA/occipital infarcts  - STEPHANIE (8/17) with AV showing two linear mobile echogenic elements attached to valve leaflets consistent with Lambel's excrescence, but no TRINITY thrombus, and lambel's excrescence with uncertain clinical implication.   - EEG (8/11-8/15) with no seizures   - ILR interrogation (9/7) with SR and PACs falsely recorded as AF.   - Attempted to resume ASA for medical management but held given GIB   - Continue on lipitor for medical management   - Course complicated by questionable head and body shaking 9/8 however prolactin elevated and shakes head yes/no to some questions.   - Lethargy more likely second to respiratory acidosis and will hold on RPT EEG    - Monitor mentation closely     CARDIOVASCULAR  # HTN Urgency   # Septic vs vasoplegic shock   - Labile BPs ranging in between SBP 80s-200s and attempted labetalol, however noted with hypotension and bradycardia likely from BB toxicity vs shock state?    - Holding Labetalol and Catapres   - Continue on Cardura for now however if BP soft preventing fluid removal then hold Cardura     # Hx of HFpEF with likely ADHF with volume overload.   - ECHO (7/2023) with EF 59 with severe LVH and diastolic dysfunction   - STEPHANIE (8/18) with normal LVRVSF however noted with increased LA pressures and persistent LA septal bowing into RA.   - ECHO (8/11) with EF 60 with mild LVH, normal LVRVSF, and mild ddfxn.  - Remove volume with HD sessions and intermittent bumex pushes as BP allows       HEENT   # Left ear OE   - ENT evaluation (9/7) with no mastoid tenderness, however found with positive swelling and excoriation to left auricle and tenderness to manipulation of auricle. Debrided crusting of auricle and EAC evaluated with edema and unable to visualize TM. EAC debrided and found with watery exudate.   - Continue on CiproHC (9/5 - )   - Continue on Bradford (9/1 - ) as below   - ENT following and ear wick change QD    # Oral Lesions with questionable Zoster vs Trauma?   - Patient with tongue pain and seen with anterior ulcerations consolidating and crusting and posterior ulceration.  - Case discussed with pathology resident and culture/ cytology sent.   - HSV negative and Path (9/6) with normal appearing epithelial cells singly and in clusters devoid of viral cytopathic effect.   - Continue on magic mouth wash for pain    RESPIRATORY  # AHHRF second to volume overload   - Hx of CKD and HFpEF presented with SOB and hypercarbia second to volume overload requiring intubation and HD initiation   - Vent weaning attempted however limited requiring tracheostomy (9/1) with IP   - Course complicated by PIPs elevated (50s) and respiratory acidosis second to secretions vs volume ?    - Vent adjusted 20/300/5/30 without improvement.   - POCUS (9/8) with BL pleural effusions (large on right and small on left)   - CT CHEST (9/8) with TBM, BL pleural effusions and continued consolidations   - Continue on vent and given TBM increased PEEP (9/9) to 20/300/8/40 for now.   - Continue on duonebs and HTS for airway clearance   - Continue volume removal with diuresis and aggressive UF sessions.   - Given TBM and PIPs plan for right sided therapeutic tap today      GI  # UGIB   - CGE x 1 episode on 8/24 and melena x 2 episodes on 8/31 likely residual blood.   - EGD (8/31) found with esophagitis and erosive gastropathy  - Continue on PPI BID through late October and then PPI QD     # Dysphagia   - NGT-TF continued   - Pending PEG however needs improvement in infectious status prior to placement.     RENAL  # Progressive CKD   - Presented volume overload and progressive RUSS on CKD (baseline CRE in 2s and mode into the 3s on admission) likely obstruction vs low flow state with shock vs AIN from drug reaction issue?  - POCUS with no signs of hydronephrosis   - Pressor support started with improvement in renal function  - Attempted steroid course in ICU without improvement in renal function   - Case discussed at length with renal and initiated on HD in ICU and now continued on HD TTHS, however remains volume overloaded.   - Patient oliguric however responds to Bumex pushes   - Continue on aggressive UF sessions with HD TTHS and bumex pushes as BP allows   - Monitor renal function and UOP     # Hyperphosphatemia   - PTH normal and elevated phos likely from renal failure  - Phos binder with Renvela started with improvement.     INFECTIOUS DISEASE  # ESBL ECOLI and MSSA VAP c/b MSSA bacteremia   - RVP/ COVID (8/11) negative   - SCx (8/11) with MSSA s/p cefepime (8/12-8/13) and then ancef (8/14) however noted with drug reaction and then changed to vanco and aztreonam (8/12-8/13) in ICU .   - Course complicated by recurrent fevers and return of MSSA with bacteremia.   - SCx (9/1) with MSSA and ESBL ECOLI   - BAL (9/1) with MSSA   - BCx (9/1) with MSSA and cleared on (9/4)   - ECHO (9/6) unable to rule out endocarditis   - Continue on Bradford (9/4 - ) and Vanco BY DOSE POST HD (9/4, 9/7 ...) with duration TBD at this time.   - Given inability to rule out endocarditis will discuss possible 4 wks?   - Course complicated by fever (9/7) and UA with leuks but no bacteria, however compared to prior improved, RVP/COVID and SCx negative, and RPT CXR similar to prior   - Pending BCx, SCx, UCx, and LE DOPPLERS    # Left ear OE   - ENT evaluation (9/7) with no mastoid tenderness, however found with positive swelling and excoriation to left auricle and tenderness to manipulation of auricle. Debrided crusting of auricle and EAC evaluated with edema and unable to visualize TM. EAC debrided and found with watery exudate.   - Continue on CiproHC (9/5 - )   - Continue on Bradford (9/1 - ) as below   - Given fevers pending CT TEMPORAL BONE     # MRSA PCRs   - MRSA PCR (8/11 and 8/14) negative   - Next MRSA PCR ordered for 9/10     HEME  # Anemia second to GIB vs renal disease   - Hold chemical DVT PPX given bleeding/ waxing and waning HH   - s/p multiple units of PRBCs (8/15, 8/21, 8/28, 8/31 and 9/8)   - Anemia panel with AOCD but with low %sat and ferrous sulfate added to optimize   - Monitor HH and discuss with renal for EPO sometime next week.     ONC   # Hx of Breast CA   - Patient dx in 2018 and s/p BL mastectomy (radical on right) and chemo and RT.   - Supportive care.     ENDOCRINE  # IDDM2   - Continue on NPH 3U and ISS   - Monitor FS and adjust as needed     LINES/ TUBES  - R ARTHUR TAYLOR (8/19 - )     SKIN  # Drug Eruption   - Attempted cefepime (8/12) vs ancef (8/13-8/14) and then noted with drug rxn.   - Hold further cephalosporins at this time   - Continue on steroids with prednisone 40 (8/18 - 9/2) then prednisone 30 (9/3-9/7), then prednisone 20 (9/8 - 9/10), then prednisone 10mg (9/11-9/13) then turn off.     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 While in MICU patient noted with worsening renal failure requiring HD initiation, CGE/ Melena s/p EGD 8/31 found with esophagitis and erosive gastropathy, new right cerebellar infarct and fevers second to ESBL COLI and MSSA VAP, MSSA bacteremia, left ear otitis externa and drug rxn to cephalosporins Course further complicated by prolonged vent time s/p tracheostomy 9/1 and transferred to RCU 9/3 completed by recurrent fevers with high PIP, respiratory acidosis and concern for volume overloaded.     NEUROLOGY  # Metabolic Encephalopath vs NEW cerebella infarct   - Baseline MS AOX3 with short term memory changes per family however noted with concern for poor mentation in ICU  - MRI (8/17) with NEW right cerebellar infarct and old left PCA/occipital infarcts  - STEPHANIE (8/17) with AV showing two linear mobile echogenic elements attached to valve leaflets consistent with Lambel's excrescence, but no TRINITY thrombus, and lambel's excrescence with uncertain clinical implication.   - EEG (8/11-8/15) with no seizures   - ILR interrogation (9/7) with SR and PACs falsely recorded as AF.   - Attempted to resume ASA for medical management but held given GIB   - Continue on lipitor for medical management   - Course complicated by questionable head and body shaking 9/8 however prolactin elevated and shakes head yes/no to some questions.   - Lethargy more likely second to respiratory acidosis and will hold on RPT EEG    - Monitor mentation closely     CARDIOVASCULAR  # HTN Urgency   # Septic vs vasoplegic shock   - Labile BPs ranging in between SBP 80s-200s and attempted labetalol, however noted with hypotension and bradycardia likely from BB toxicity vs shock state?    - Holding Labetalol and Catapres   - Continue on Cardura for now however if BP soft preventing fluid removal then hold Cardura     # Hx of HFpEF with likely ADHF with volume overload.   - ECHO (7/2023) with EF 59 with severe LVH and diastolic dysfunction   - STEPHANIE (8/18) with normal LVRVSF however noted with increased LA pressures and persistent LA septal bowing into RA.   - ECHO (8/11) with EF 60 with mild LVH, normal LVRVSF, and mild ddfxn.  - Remove volume with HD sessions and intermittent bumex pushes as BP allows       HEENT   # Left ear OE   - ENT evaluation (9/7) with no mastoid tenderness, however found with positive swelling and excoriation to left auricle and tenderness to manipulation of auricle. Debrided crusting of auricle and EAC evaluated with edema and unable to visualize TM. EAC debrided and found with watery exudate.   - Continue on CiproHC (9/5 - )   - Continue on Bradford (9/1 - ) as below   - ENT following and ear wick change QD    # Oral Lesions with questionable Zoster vs Trauma?   - Patient with tongue pain and seen with anterior ulcerations consolidating and crusting and posterior ulceration.  - Case discussed with pathology resident and culture/ cytology sent.   - HSV negative and Path (9/6) with normal appearing epithelial cells singly and in clusters devoid of viral cytopathic effect.   - Continue on magic mouth wash for pain    RESPIRATORY  # AHHRF second to volume overload   - Hx of CKD and HFpEF presented with SOB and hypercarbia second to volume overload requiring intubation and HD initiation   - Vent weaning attempted however limited requiring tracheostomy (9/1) with IP   - Course complicated by PIPs elevated (50s) and respiratory acidosis second to secretions vs volume ?    - Vent adjusted 20/300/5/30 without improvement.   - POCUS (9/8) with BL pleural effusions (large on right and small on left)   - CT CHEST (9/8) with TBM, BL pleural effusions and continued consolidations   - Continue on vent and given TBM increased PEEP (9/9) to 20/300/8/40 for now.   - Continue on duonebs and HTS for airway clearance   - Continue volume removal with diuresis and aggressive UF sessions.   - Given TBM and PIPs plan for right sided therapeutic tap today      GI  # UGIB   - CGE x 1 episode on 8/24 and melena x 2 episodes on 8/31 likely residual blood.   - EGD (8/31) found with esophagitis and erosive gastropathy  - Continue on PPI BID through late October and then PPI QD     # Dysphagia   - NGT-TF continued   - Pending PEG however needs improvement in infectious status prior to placement.     RENAL  # Progressive CKD   - Presented volume overload and progressive RUSS on CKD (baseline CRE in 2s and mode into the 3s on admission) likely obstruction vs low flow state with shock vs AIN from drug reaction issue?  - POCUS with no signs of hydronephrosis   - Pressor support started with improvement in renal function  - Attempted steroid course in ICU without improvement in renal function   - Case discussed at length with renal and initiated on HD in ICU and now continued on HD TTHS, however remains volume overloaded.   - Patient oliguric however responds to Bumex pushes   - Continue on aggressive UF sessions with HD TTHS and bumex pushes as BP allows   - Monitor renal function and UOP     # Hyperphosphatemia   - PTH normal and elevated phos likely from renal failure  - Phos binder with Renvela started with improvement.     INFECTIOUS DISEASE  # ESBL ECOLI and MSSA VAP c/b MSSA bacteremia   - RVP/ COVID (8/11) negative   - SCx (8/11) with MSSA s/p cefepime (8/12-8/13) and then ancef (8/14) however noted with drug reaction and then changed to vanco and aztreonam (8/12-8/13) in ICU .   - Course complicated by recurrent fevers and return of MSSA with bacteremia.   - SCx (9/1) with MSSA and ESBL ECOLI   - BAL (9/1) with MSSA   - BCx (9/1) with MSSA and cleared on (9/4)   - ECHO (9/6) unable to rule out endocarditis   - Continue on Bradford (9/4 - ) and Vanco BY DOSE POST HD (9/4, 9/7, 9/9 ...) with duration TBD at this time.   - Given inability to rule out endocarditis will discuss possible 4 wks?   - Course complicated by fever (9/7) and UA with leuks but no bacteria, however compared to prior improved, RVP/COVID and SCx negative, and RPT CXR similar to prior   - Pending BCx, SCx, UCx, and LE DOPPLERS    # Left ear OE   - ENT evaluation (9/7) with no mastoid tenderness, however found with positive swelling and excoriation to left auricle and tenderness to manipulation of auricle. Debrided crusting of auricle and EAC evaluated with edema and unable to visualize TM. EAC debrided and found with watery exudate.   - Continue on CiproHC (9/5 - )   - Continue on Bradford (9/1 - ) as below   - Given fevers pending CT TEMPORAL BONE     # MRSA PCRs   - MRSA PCR (8/11 and 8/14) negative   - Next MRSA PCR ordered for 9/10     HEME  # Anemia second to GIB vs renal disease   - Hold chemical DVT PPX given bleeding/ waxing and waning HH   - s/p multiple units of PRBCs (8/15, 8/21, 8/28, 8/31 and 9/8)   - Anemia panel with AOCD but with low %sat and ferrous sulfate added to optimize   - Monitor HH and discuss with renal for EPO sometime next week.     ONC   # Hx of Breast CA   - Patient dx in 2018 and s/p BL mastectomy (radical on right) and chemo and RT.   - Supportive care.     ENDOCRINE  # IDDM2   - Continue on NPH 3U and ISS   - Monitor FS and adjust as needed     LINES/ TUBES  - R ARTHUR TAYLOR (8/19 - )     SKIN  # Drug Eruption   - Attempted cefepime (8/12) vs ancef (8/13-8/14) and then noted with drug rxn.   - Hold further cephalosporins at this time   - Continue on steroids with prednisone 40 (8/18 - 9/2) then prednisone 30 (9/3-9/7), then prednisone 20 (9/8 - 9/10), then prednisone 10mg (9/11-9/13) then turn off.     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 While in MICU patient noted with worsening renal failure requiring HD initiation, CGE/ Melena s/p EGD 8/31 found with esophagitis and erosive gastropathy, new right cerebellar infarct and fevers second to ESBL COLI and MSSA VAP, MSSA bacteremia, left ear otitis externa and drug rxn to cephalosporins Course further complicated by prolonged vent time s/p tracheostomy 9/1 and transferred to RCU 9/3 completed by recurrent fevers with high PIP, respiratory acidosis and concern for volume overloaded.     NEUROLOGY  # Metabolic Encephalopath vs NEW cerebella infarct   - Baseline MS AOX3 with short term memory changes per family however noted with concern for poor mentation in ICU  - MRI (8/17) with NEW right cerebellar infarct and old left PCA/occipital infarcts  - STEPHANIE (8/17) with AV showing two linear mobile echogenic elements attached to valve leaflets consistent with Lambel's excrescence, but no TRINITY thrombus, and lambel's excrescence with uncertain clinical implication.   - EEG (8/11-8/15) with no seizures   - ILR interrogation (9/7) with SR and PACs falsely recorded as AF.   - Attempted to resume ASA for medical management but held given GIB   - Continue on lipitor for medical management   - Course complicated by questionable head and body shaking 9/8 however prolactin elevated and shakes head yes/no to some questions.   - Lethargy more likely second to respiratory acidosis and will hold on RPT EEG    - Monitor mentation closely     CARDIOVASCULAR  # HTN Urgency   # Septic vs vasoplegic shock   - Labile BPs ranging in between SBP 80s-200s and attempted labetalol, however noted with hypotension and bradycardia likely from BB toxicity vs shock state?    - Holding Labetalol and Catapres   - Continue on Cardura for now however if BP soft preventing fluid removal then hold Cardura     # Hx of HFpEF with likely ADHF with volume overload.   - ECHO (7/2023) with EF 59 with severe LVH and diastolic dysfunction   - STEPHANIE (8/18) with normal LVRVSF however noted with increased LA pressures and persistent LA septal bowing into RA.   - ECHO (8/11) with EF 60 with mild LVH, normal LVRVSF, and mild ddfxn.  - Remove volume with HD sessions and intermittent bumex pushes as BP allows       HEENT   # Left ear OE   - ENT evaluation (9/7) with no mastoid tenderness, however found with positive swelling and excoriation to left auricle and tenderness to manipulation of auricle. Debrided crusting of auricle and EAC evaluated with edema and unable to visualize TM. EAC debrided and found with watery exudate.   - Continue on CiproHC (9/5 - )   - Continue on Bradford (9/1 - ) as below   - ENT following and ear wick change QD    # Oral Lesions with questionable Zoster vs Trauma?   - Patient with tongue pain and seen with anterior ulcerations consolidating and crusting and posterior ulceration.  - Case discussed with pathology resident and culture/ cytology sent.   - HSV negative and Path (9/6) with normal appearing epithelial cells singly and in clusters devoid of viral cytopathic effect.   - Continue on magic mouth wash for pain    RESPIRATORY  # AHHRF second to volume overload   - Hx of CKD and HFpEF presented with SOB and hypercarbia second to volume overload requiring intubation and HD initiation   - Vent weaning attempted however limited requiring tracheostomy (9/1) with IP   - Course complicated by PIPs elevated (50s) and respiratory acidosis second to secretions vs volume ?    - Vent adjusted 20/300/5/30 without improvement.   - POCUS (9/8) with BL pleural effusions (large on right and small on left)   - CT CHEST (9/8) with TBM, BL pleural effusions and continued consolidations   - Continue on vent and given TBM increased PEEP (9/9) to 20/300/8/40 with overall improvement   - Continue on duonebs and HTS for airway clearance   - Continue volume removal with diuresis and aggressive UF sessions.   - Discussing possible thora but appears improved and will monitor.       GI  # UGIB   - CGE x 1 episode on 8/24 and melena x 2 episodes on 8/31 likely residual blood.   - EGD (8/31) found with esophagitis and erosive gastropathy  - Continue on PPI BID through late October and then PPI QD     # Dysphagia   - NGT-TF continued   - Pending PEG however needs improvement in infectious status prior to placement.     RENAL  # Progressive CKD   - Presented volume overload and progressive RUSS on CKD (baseline CRE in 2s and mode into the 3s on admission) likely obstruction vs low flow state with shock vs AIN from drug reaction issue?  - POCUS with no signs of hydronephrosis   - Pressor support started with improvement in renal function  - Attempted steroid course in ICU without improvement in renal function   - Case discussed at length with renal and initiated on HD in ICU and now continued on HD TTHS, however remains volume overloaded.   - Patient oliguric however responds to Bumex pushes   - Continue on aggressive UF sessions with HD TTHS and bumex pushes as BP allows   - Monitor renal function and UOP     # Hyperphosphatemia   - PTH normal and elevated phos likely from renal failure  - Phos binder with Renvela started with improvement.     INFECTIOUS DISEASE  # ESBL ECOLI and MSSA VAP c/b MSSA bacteremia   - RVP/ COVID (8/11) negative   - SCx (8/11) with MSSA s/p cefepime (8/12-8/13) and then ancef (8/14) however noted with drug reaction and then changed to vanco and aztreonam (8/12-8/13) in ICU .   - Course complicated by recurrent fevers and return of MSSA with bacteremia.   - SCx (9/1) with MSSA and ESBL ECOLI   - BAL (9/1) with MSSA   - BCx (9/1) with MSSA and cleared on (9/4)   - ECHO (9/6) unable to rule out endocarditis   - Continue on Bradford (9/4 - ) and Vanco BY DOSE POST HD (9/4, 9/7, 9/9 ...) with duration TBD at this time.   - Given inability to rule out endocarditis will discuss possible 4 wks?   - Course complicated by fever (9/7) and UA with leuks but no bacteria, however compared to prior improved, RVP/COVID and SCx negative, and RPT CXR similar to prior   - Pending BCx, SCx, UCx, and LE DOPPLERS    # Left ear OE   - ENT evaluation (9/7) with no mastoid tenderness, however found with positive swelling and excoriation to left auricle and tenderness to manipulation of auricle. Debrided crusting of auricle and EAC evaluated with edema and unable to visualize TM. EAC debrided and found with watery exudate.   - Continue on CiproHC (9/5 - )   - Continue on Bradford (9/1 - ) as below   - Given fevers pending CT TEMPORAL BONE     # MRSA PCRs   - MRSA PCR (8/11 and 8/14) negative   - Next MRSA PCR ordered for 9/10     HEME  # Anemia second to GIB vs renal disease   - Hold chemical DVT PPX given bleeding/ waxing and waning HH   - s/p multiple units of PRBCs (8/15, 8/21, 8/28, 8/31 and 9/8)   - Anemia panel with AOCD but with low %sat and ferrous sulfate added to optimize   - Monitor HH and discuss with renal for EPO sometime next week.     ONC   # Hx of Breast CA   - Patient dx in 2018 and s/p BL mastectomy (radical on right) and chemo and RT.   - Supportive care.     ENDOCRINE  # IDDM2   - Continue on NPH 3U and ISS   - Monitor FS and adjust as needed     LINES/ TUBES  - R ARTHUR TAYLOR (8/19 - )     SKIN  # Drug Eruption   - Attempted cefepime (8/12) vs ancef (8/13-8/14) and then noted with drug rxn.   - Hold further cephalosporins at this time   - Continue on steroids with prednisone 40 (8/18 - 9/2) then prednisone 30 (9/3-9/7), then prednisone 20 (9/8 - 9/10), then prednisone 10mg (9/11-9/13) then turn off.     ETHICS/ GOC    - FULL CODE     DISPO - TBD

## 2023-09-09 NOTE — PROGRESS NOTE ADULT - SUBJECTIVE AND OBJECTIVE BOX
Subjective: Patient seen and examined. No new events except as noted.   Daughter at bedside.  Remains in RCU.      REVIEW OF SYSTEMS:  Unable to obtain.    MEDICATIONS:  MEDICATIONS  (STANDING):  albuterol/ipratropium for Nebulization 3 milliLiter(s) Nebulizer every 6 hours  atorvastatin 40 milliGRAM(s) Oral at bedtime  chlorhexidine 0.12% Liquid 15 milliLiter(s) Oral Mucosa every 12 hours  chlorhexidine 2% Cloths 1 Application(s) Topical daily  ciprofloxacin/hydrocortisone Suspension Otic 4 Drop(s) Left Ear two times a day  dextrose 5%. 1000 milliLiter(s) (100 mL/Hr) IV Continuous <Continuous>  dextrose 5%. 1000 milliLiter(s) (50 mL/Hr) IV Continuous <Continuous>  dextrose 50% Injectable 25 Gram(s) IV Push once  dextrose 50% Injectable 25 Gram(s) IV Push once  dextrose 50% Injectable 12.5 Gram(s) IV Push once  doxazosin 6 milliGRAM(s) Oral <User Schedule>  FIRST- Mouthwash  BLM 10 milliLiter(s) Swish and Spit four times a day  glucagon  Injectable 1 milliGRAM(s) IntraMuscular once  insulin lispro (ADMELOG) corrective regimen sliding scale   SubCutaneous every 6 hours  insulin NPH human recombinant 3 Unit(s) SubCutaneous every 6 hours  meropenem  IVPB 500 milliGRAM(s) IV Intermittent every 12 hours  pantoprazole  Injectable 40 milliGRAM(s) IV Push two times a day  predniSONE   Tablet 20 milliGRAM(s) Oral daily  sevelamer carbonate Powder 1600 milliGRAM(s) Oral three times a day  sodium chloride 3%  Inhalation 4 milliLiter(s) Inhalation every 6 hours    PHYSICAL EXAM:  Vital Signs Last 24 Hrs  T(C): 38.2 (09 Sep 2023 05:40), Max: 38.2 (09 Sep 2023 05:40)  T(F): 100.8 (09 Sep 2023 05:40), Max: 100.8 (09 Sep 2023 05:40)  HR: 113 (09 Sep 2023 05:40) (86 - 113)  BP: 148/82 (09 Sep 2023 05:40) (111/35 - 157/60)  BP(mean): --  RR: 20 (09 Sep 2023 05:40) (18 - 20)  SpO2: 100% (09 Sep 2023 05:40) (95% - 100%)    Parameters below as of 09 Sep 2023 05:40  Patient On (Oxygen Delivery Method): ventilator    O2 Concentration (%): 40    I&O's Summary    07 Sep 2023 07:01  -  08 Sep 2023 07:00  --------------------------------------------------------  IN: 1330 mL / OUT: 1400 mL / NET: -70 mL    08 Sep 2023 07:01  -  09 Sep 2023 06:45  --------------------------------------------------------  IN: 940 mL / OUT: 100 mL / NET: 840 mL    Appearance: NAD, +trach  HEENT: dry oral mucosa  Lymphatic: No lymphadenopathy  Cardiovascular: Normal S1 S2, No JVD, No murmurs, No edema  Respiratory: Decreased BS, + trach to vent	  Neuro: opens eyes  Gastrointestinal: Soft, Non-tender, + BS, + OGT	  Skin: No rashes, No ecchymoses, No cyanosis	  Extremities: No strength/ROM 2/2 sedation, + BL LE edema  Vascular: Peripheral pulses palpable 2+ bilaterally    LABS:    CARDIAC MARKERS:                        8.5    10.96 )-----------( 179      ( 08 Sep 2023 17:39 )             27.1     09-08    131<L>  |  97<L>  |  74<H>  ----------------------------<  228<H>  5.1   |  24  |  2.67<H>    Ca    7.9<L>      08 Sep 2023 18:30  Phos  4.7     09-08  Mg     2.40     09-08    TPro  5.7<L>  /  Alb  2.2<L>  /  TBili  0.2  /  DBili  <0.2  /  AST  24  /  ALT  14  /  AlkPhos  200<H>  09-08    TELEMETRY: ST    ECG:  	  RADIOLOGY:   DIAGNOSTIC TESTING:  [ ] Echocardiogram:  [ ]  Catheterization:  [ ] Stress Test:    OTHER:

## 2023-09-09 NOTE — PROGRESS NOTE ADULT - SUBJECTIVE AND OBJECTIVE BOX
NEPHROLOGY     Patient seen and examined while on HD, spouse at bedside, trach to vent, febrile, in no acute distress.     MEDICATIONS  (STANDING):  albuterol/ipratropium for Nebulization 3 milliLiter(s) Nebulizer every 6 hours  atorvastatin 40 milliGRAM(s) Oral at bedtime  chlorhexidine 0.12% Liquid 15 milliLiter(s) Oral Mucosa every 12 hours  chlorhexidine 2% Cloths 1 Application(s) Topical daily  ciprofloxacin/hydrocortisone Suspension Otic 4 Drop(s) Left Ear two times a day  dextrose 5%. 1000 milliLiter(s) (50 mL/Hr) IV Continuous <Continuous>  dextrose 5%. 1000 milliLiter(s) (100 mL/Hr) IV Continuous <Continuous>  dextrose 50% Injectable 12.5 Gram(s) IV Push once  dextrose 50% Injectable 25 Gram(s) IV Push once  dextrose 50% Injectable 25 Gram(s) IV Push once  doxazosin 6 milliGRAM(s) Oral <User Schedule>  FIRST- Mouthwash  BLM 10 milliLiter(s) Swish and Spit four times a day  glucagon  Injectable 1 milliGRAM(s) IntraMuscular once  insulin lispro (ADMELOG) corrective regimen sliding scale   SubCutaneous every 6 hours  insulin NPH human recombinant 3 Unit(s) SubCutaneous every 6 hours  meropenem  IVPB 500 milliGRAM(s) IV Intermittent every 12 hours  pantoprazole  Injectable 40 milliGRAM(s) IV Push two times a day  predniSONE   Tablet 20 milliGRAM(s) Oral daily  predniSONE   Tablet   Oral   sevelamer carbonate Powder 1600 milliGRAM(s) Oral three times a day  sodium chloride 3%  Inhalation 4 milliLiter(s) Inhalation every 6 hours    VITALS:  T(C): , Max: 38.2 (09-09-23 @ 05:40)  T(F): , Max: 100.8 (09-09-23 @ 05:40)  HR: 103 (09-09-23 @ 07:17)  BP: 103/33 (09-09-23 @ 06:31)  RR: 18 (09-09-23 @ 06:31)  SpO2: 97% (09-09-23 @ 07:17)    I and O's:    09-08 @ 07:01  -  09-09 @ 07:00  --------------------------------------------------------  IN: 940 mL / OUT: 100 mL / NET: 840 mL    PHYSICAL EXAM:  Constitutional: lethargic, nad, following simple commands  HEENT: (+)NGT  Neck: (+)trach-vent  Respiratory: coarse BS b/l  Cardiovascular: tachy s1s2  Gastrointestinal: BS+, soft, NT/ND  Extremities: + peripheral edema  Neurological: tone WNL  : no Wheeler  Skin: No rashes  Access: [facility] HD catheter (8/19)    LABS:                        8.5    10.96 )-----------( 179      ( 08 Sep 2023 17:39 )             27.1     09-08    131<L>  |  97<L>  |  74<H>  ----------------------------<  228<H>  5.1   |  24  |  2.67<H>    Ca    7.9<L>      08 Sep 2023 18:30  Phos  4.7     09-08  Mg     2.40     09-08    TPro  5.7<L>  /  Alb  2.2<L>  /  TBili  0.2  /  DBili  <0.2  /  AST  24  /  ALT  14  /  AlkPhos  200<H>  09-08    Urine Studies:  Urinalysis Basic - ( 08 Sep 2023 18:30 )    Color: x / Appearance: x / SG: x / pH: x  Gluc: 228 mg/dL / Ketone: x  / Bili: x / Urobili: x   Blood: x / Protein: x / Nitrite: x   Leuk Esterase: x / RBC: x / WBC x   Sq Epi: x / Non Sq Epi: x / Bacteria: x    RADIOLOGY & ADDITIONAL STUDIES:    ACC: [id number] EXAM:  CT CHEST   ORDERED BY: WILNER ZENG     PROCEDURE DATE:  09/08/2023          INTERPRETATION:  INDICATION: HIGH PIPS, respiratory distress, intubated  TECHNIQUE: Unenhanced CT of the chest. Coronal, sagittal, and MIP images   were reconstructed and reviewed.  COMPARISON: 8/11/2023 chest CT.    FINDINGS:    AIRWAYS, LUNGS, PLEURA: Endotracheal tube tip above the ines. Below the   endotracheal tube tip, the lower trachea is partially collapsed with a   crescentic configuration suggestive of tracheomalacia. More inferiorly   there is severe stenosis of the trachea measuring 5.6 x 4.1 mm.    The left upper lobe bronchus is obliterated, new from prior exam. There   is paramediastinal and perihilar consolidation of left upper lobe,   increased from prior exam. Tree-in-bud opacities throughout the left lung   are increased.    Cavitary consolidation of right upper lobe is not significantly changed.   Tree-in-bud opacities within the right lung are increased.    Large right pleural effusion and small left pleural effusion are   increased.    LYMPH NODES, MEDIASTINUM: Mildly enlarged mediastinal lymph nodes for   reference: 2.1 x 1 cm AP window lymph node.    HEART, VESSELS: Heart size is normal. No pericardial effusion. Thoracic   aorta normal in diameter. Aortic and coronary calcifications.    VISUALIZED UPPER ABDOMEN: Enteric tube within the stomach.   Atherosclerosis.    CHEST WALL, BONES: No aggressive osseous lesion. Stable compression   deformity of L1 vertebral body. Increased anasarca. Bilateral mastectomy.    LOWER NECK: Within normal limits.    IMPRESSION:    Endotracheal tube tip above the ines. Below the endotracheal tube tip,   the lower trachea is partially collapsed with a crescentic configuration   suggestive of tracheomalacia. More inferiorly there is severe stenosis of   the trachea measuring 5.6 x 4.1 mm.    The left upper lobe bronchus is obliterated, new from prior exam. There   is paramediastinal and perihilar consolidation of left upper lobe,   increased from prior exam. Tree-in-bud opacities throughout the left lung   are increased.    Cavitary consolidation of right upper lobe is not significantly changed.   Tree-in-bud opacities within the right lung are increased.    Large right pleural effusion and small left pleural effusion are   increased.    --- End of Report ---      RODO EARL MD; Attending Radiologist  This document has been electronically signed. Sep  9 2023  8:34AM

## 2023-09-09 NOTE — PROGRESS NOTE ADULT - ASSESSMENT
74 y/o F well known to me from my housecal outpt practice. she was admitted at [facility] 7/12-7/22 w aspiration PNA, was treated w CEFEPIME, developed an allergic rash,  dCHF, + MAC on AFB culture, had been progressively getting more and more lethargic and dyspneic at home since DC.   In  am of 8/11/23  ptn presented with respiratory distress w hypoxia and hypercarbia requiring intubation 2/2 volume overload +/- Asp PNA      Neuro   responds appropriately   Baseline MS AOx3, aphasic   - h/o CVA , on aspirin and statin . 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 Mcnulty, now in RCU  s/p septic shock overnight 9/1, now on meropenem, HDS  s/p trache, on vent support, copious secretions      Renal   Hyperkalemia with EKG changes  , initially treated w LOKELMA,  now resolved   Ptn well know to Dr. Colon, consult reviewed    HD 8/19,  8/21, 8/26 and 8/28.  s/p PUF 8/29 2.5 Liters, HD 8/30,  9/1, 9/4  started chronic HD 9/7,  next session 9/9. may need cardura to be DCed in order to tolerate fluid removal      GI  NPO  on tube feeds  coffee ground emesis x 1 8/24, melena overnight 8/31, s/p 1UPRBC, EGD/Colonoscopy: erosive gastropathy, esophagisits, on colonoscopy old blood seen no active bleeding, poor prep  family to decide re PEG placement    Endocrine  T2DM   A1C 7.1 in 7/2023   - BG goal 140-200     ID   No fever/leukocytosis, recheck temp   Hx latent TB which was treated, no concern for TB   - grew MAC on AFB culture from most recent admission. at [facility]  -MSSA Bacteremia 9/1, allergic to cephalosprins and PCN, on Vanco as per ID, renal dosing,   - sputum also grew E.Coli-ESBL, as per ID recs for  Bradford through 9/7( 1 week course as per ID) , afebrile.  - 9/8:  spike  temp 38.1C, has O. externa, has a wick, recs are to get a CT to R/O an abscess/bony involvement. cont Meropenem  9/9: ptn w fever overnight to 100.8,  may need shiley pulled if bacteremic, needs NECK CT as per ENT to R/O Mastoid bone involvement while having ongoing purulent DC from L ear 2/2 O. externa, getting daily wick changes          Heme/Onc  ppx: place on SCD  Transfuse for hgb 6.4, no obv bleed. HDS    Ethics  GOC - Discussed GOC with daughter and , they have opted in the past for full code. and she remains full code at present

## 2023-09-09 NOTE — PROGRESS NOTE ADULT - ASSESSMENT
IMPRESSION: 73F w/ HTN, DM2, CVA, breast CA-bilateral mastectomy, recurrent aspiration pneumonia/respiratory failure, and CKD, 8/11/23 p/w acute hypercapnic respiratory failure; c/b RUSS    (1)CKD - stage 4     (2)RUSS - ATN vs AIN -BUN and creatinine not only worsening as of today, but we are now seeing several worsening electrolyte derangements, (hyponatremia, hyperkalemia, hyperphosphatemia). Now on HD  (3)Hyponatremia - worsening as of yesterday     (4)Hyperkalemia - worsening slowly as of yesterday, despite being on Nepro    (5)Pulm- hypercapnic respiratory failure on admission - now s/p trach; remains on vent     (6)CV - normotensive     (7)ID - on IV Meropenem/Vanco    RECOMMEND:  (1)HD today: 3 hours, 2.5kg UF  (2)F/U pan-cx; shiley removal if BCx (+)  (3)Prednisone taper, on 20mg po qd   (4)Dose new meds for GFR <10/HD    Nilda Espinoza, HEAVEN  [facility]  [phone]

## 2023-09-10 NOTE — PROGRESS NOTE ADULT - SUBJECTIVE AND OBJECTIVE BOX
Patient seen and examined at bedside. Resting in bed. + trach to vent. Family at bedside. Fevers noted overnight. Tolerated HD yesterday. Removed 1 L        VITAL:  T(C): , Max: 38.9 (09-10-23 @ 04:00)  T(F): , Max: 102 (09-10-23 @ 04:00)  HR: 113 (09-10-23 @ 11:17)  BP: 135/76 (09-10-23 @ 04:00)  BP(mean): 85 (09-10-23 @ 04:00)  RR: 22 (09-10-23 @ 04:00)  SpO2: 100% (09-10-23 @ 11:17)  Wt(kg): --      PHYSICAL EXAM:    Constitutional: lethargic, nad, following simple commands  HEENT: (+)NGT  Neck: (+)trach-vent  Respiratory: coarse/ rhonchi BS b/l  Cardiovascular: tachy s1s2  Gastrointestinal: BS+, soft, NT/ND  Extremities: + peripheral edema  Neurological: tone WNL  : no Wheeler  Skin: No rashes  Access: [facility] HD catheter (8/19)    LABS:                        8.0    11.56 )-----------( 221      ( 10 Sep 2023 08:15 )             25.0     Na(131)/K(3.9)/Cl(96)/HCO3(24)/BUN(54)/Cr(1.97)Glu(185)/Ca(7.7)/Mg(2.10)/PO4(3.1)    09-10 @ 06:52  Na(133)/K(3.6)/Cl(97)/HCO3(28)/BUN(46)/Cr(1.57)Glu(149)/Ca(7.8)/Mg(2.10)/PO4(2.3)    09-09 @ 07:50  Na(131)/K(5.1)/Cl(97)/HCO3(24)/BUN(74)/Cr(2.67)Glu(228)/Ca(7.9)/Mg(2.40)/PO4(4.7)    09-08 @ 18:30    Urinalysis Basic - ( 10 Sep 2023 06:52 )    Color: x / Appearance: x / SG: x / pH: x  Gluc: 185 mg/dL / Ketone: x  / Bili: x / Urobili: x   Blood: x / Protein: x / Nitrite: x   Leuk Esterase: x / RBC: x / WBC x   Sq Epi: x / Non Sq Epi: x / Bacteria: x

## 2023-09-10 NOTE — PROGRESS NOTE ADULT - SUBJECTIVE AND OBJECTIVE BOX
OTOLARYNGOLOGY (ENT) PROGRESS NOTE    PATIENT: MILANA BALL  MRN: [id number]  : 46  [id number]  DATE OF SERVICE:  09-10-23  			         Subjective/ Interval: Patient seen and examined at bedside. She continues to point and pull at her ear according to her . She still has debride inside ear when ear wick removed.     ALLERGIES:  isoniazid (Rash)  nafcillin (Unknown)  hydrALAZINE (Rash)  vitamin E (Short breath; Urticaria; Hives)  doxycycline (Rash)  cefepime (Rash)  NIFEdipine (Urticaria; Hives)      MEDICATIONS:  Antiinfectives:   meropenem  IVPB 500 milliGRAM(s) IV Intermittent every 12 hours    IV fluids:  dextrose 5%. 1000 milliLiter(s) IV Continuous <Continuous>  dextrose 5%. 1000 milliLiter(s) IV Continuous <Continuous>  ferrous    sulfate Liquid 300 milliGRAM(s) Enteral Tube daily    Hematologic/Anticoagulation:  heparin   Injectable 5000 Unit(s) SubCutaneous every 8 hours    Pain medications/Neuro:  acetaminophen   Oral Liquid .. 650 milliGRAM(s) Oral every 6 hours PRN    Endocrine Medications:   atorvastatin 40 milliGRAM(s) Oral at bedtime  dextrose 50% Injectable 12.5 Gram(s) IV Push once  dextrose 50% Injectable 25 Gram(s) IV Push once  dextrose 50% Injectable 25 Gram(s) IV Push once  dextrose Oral Gel 15 Gram(s) Oral once PRN  glucagon  Injectable 1 milliGRAM(s) IntraMuscular once  insulin lispro (ADMELOG) corrective regimen sliding scale   SubCutaneous every 6 hours  insulin NPH human recombinant 3 Unit(s) SubCutaneous every 6 hours  predniSONE   Tablet   Oral     All other standing medications:   albuterol/ipratropium for Nebulization 3 milliLiter(s) Nebulizer every 6 hours  chlorhexidine 0.12% Liquid 15 milliLiter(s) Oral Mucosa every 12 hours  chlorhexidine 2% Cloths 1 Application(s) Topical daily  ciprofloxacin/hydrocortisone Suspension Otic 4 Drop(s) Left Ear two times a day  doxazosin 6 milliGRAM(s) Oral <User Schedule>  FIRST- Mouthwash  BLM 10 milliLiter(s) Swish and Spit four times a day  pantoprazole  Injectable 40 milliGRAM(s) IV Push two times a day  sevelamer carbonate Powder 1600 milliGRAM(s) Oral three times a day  sodium chloride 3%  Inhalation 4 milliLiter(s) Inhalation every 6 hours    All other PRN medications:    Vital Signs Last 24 Hrs  T(C): 38.3 (10 Sep 2023 21:00), Max: 38.9 (10 Sep 2023 04:00)  T(F): 101 (10 Sep 2023 21:00), Max: 102 (10 Sep 2023 04:00)  HR: 91 (10 Sep 2023 20:) (88 - 113)  BP: 130/55 (10 Sep 2023 20:00) (116/53 - 140/73)  BP(mean): 85 (10 Sep 2023 04:00) (85 - 91)  RR: 24 (10 Sep 2023 20:) (20 - 26)  SpO2: 99% (10 Sep 2023 20:) (93% - 100%)    Parameters below as of 10 Sep 2023 20:00  Patient On (Oxygen Delivery Method): ventilator    O2 Concentration (%): 35      09- @ 07:01  -  09-10 @ 07:00  --------------------------------------------------------  IN:    Enteral Tube Flush: 30 mL    IV PiggyBack: 400 mL    Nepro with Carb Steady: 630 mL    Other (mL): 400 mL  Total IN: 1460 mL    OUT:    Other (mL): 2800 mL    Voided (mL): 100 mL  Total OUT: 2900 mL    Total NET: -1440 mL              Mode: AC/ CMV (Assist Control/ Continuous Mandatory Ventilation), RR (machine): 20, TV (machine): 300, FiO2: 40, PEEP: 5, ITime: 0.53, MAP: 13, PIP: 34         Physical Exam:  Gen: NAD  Skin: No rashes, bruises, or lesions  Head: Normocephalic, Atraumatic  Face: no edema, erythema, or fluctuance. Parotid glands soft without mass  Eyes: no scleral injection  Ears: Right - ear canal clear, TM intact without effusion or erythema. No evidence of any fluid drainage. No mastoid tenderness, erythema, or ear bulging            Left - pinna erythematous with dry crusting in conchal bowl, removed at bedside, EAC edema stable, difficult to visualize TM, debris and infected tissue cleaned, ear wick replaced  Neck: trach noted   Lymphatic: No lymphadenopathy  Resp: breathing easily, no stridor        LABS                       8.0    11.56 )-----------( 221      ( 10 Sep 2023 08:15 )             25.0    09-10    131<L>  |  96<L>  |  54<H>  ----------------------------<  185<H>  3.9   |  24  |  1.97<H>    Ca    7.7<L>      10 Sep 2023 06:52  Phos  3.1     09-10  Mg     2.10     09-10           Coagulation Studies-   PT/INR - ( 09 Sep 2023 13:30 )   PT: 10.7 sec;   INR: 0.95 ratio         PTT - ( 09 Sep 2023 13:30 )  PTT:26.6 sec  Urinalysis Basic - ( 10 Sep 2023 06:52 )    Color: x / Appearance: x / SG: x / pH: x  Gluc: 185 mg/dL / Ketone: x  / Bili: x / Urobili: x   Blood: x / Protein: x / Nitrite: x   Leuk Esterase: x / RBC: x / WBC x   Sq Epi: x / Non Sq Epi: x / Bacteria: x      Endocrine Panel-  Calcium: 7.7 mg/dL (09-10 @ 06:52)                MICROBIOLOGY:  Culture - Sputum (collected 23 @ 11:38)  Source: .Sputum Sputum  Gram Stain (23 @ 23:15):    Numerous polymorphonuclear leukocytes per low power field    Numerous Squamous epithelial cells per low power field    Few Yeast like cells per oil power field    Results consistent with oropharyngeal contamination  Final Report (09-10-23 @ 19:10):    Normal Respiratory Cate present    Culture - Ear, Nose and Throat (ENT) (collected 23 @ 13:40)  Source: Ear Ear  Preliminary Report (23 @ 11:37):    No growth      Culture Results:   Normal Respiratory Cate present (23 @ 11:38)  Culture Results:   No growth at 48 Hours (23 @ 07:58)  Culture Results:   No growth at 48 Hours (23 @ 07:58)  Culture Results:   No growth (23 @ 13:40)  Culture Results:   No growth at 5 days (23 @ 17:05)  Culture Results:   Numerous Staphylococcus aureus Multiple Morphological Strains  Normal Respiratory Cate present (23 @ 16:50)  Culture Results:   No acid-fast bacilli isolated at 1 week. ****Acid-fast cultures are held  for 6 weeks.**** (23 @ 16:50)  Culture Results:   Rare Yeast (23 @ 16:50)  Culture Results:   Growth in aerobic and anaerobic bottles: Staphylococcus aureus  Direct identification is available within approximately 3-5  hours either by Blood Panel Multiplexed PCR or Direct  MALDI-TOF. Details: [email or web address]  Growth in anaerobic bottle: blood culture bottle turned positive after  the final report was released. (23 @ 08:45)  Culture Results:   Growth in anaerobic bottle: Staphylococcus aureus  See previous culture [id number] (23 @ 08:45)  Culture Results:   Moderate Staphylococcus aureus  Moderate Escherichia coli ESBL  Normal Respiratory Cate present (23 @ 05:30)      Culture - Sputum (collected 23 @ 11:38)  Source: .Sputum Sputum  Gram Stain (23 @ 23:15):    Numerous polymorphonuclear leukocytes per low power field    Numerous Squamous epithelial cells per low power field    Few Yeast like cells per oil power field    Results consistent with oropharyngeal contamination  Final Report (09-10-23 @ 19:10):    Normal Respiratory Cate present    Culture - Blood (collected 23 @ 07:58)  Source: .Blood Blood-Peripheral  Preliminary Report (09-10-23 @ 10:01):    No growth at 48 Hours    Culture - Blood (collected 23 @ 07:58)  Source: .Blood Blood-Peripheral  Preliminary Report (09-10-23 @ 10:01):    No growth at 48 Hours    Culture - Ear, Nose and Throat (ENT) (collected 23 @ 13:40)  Source: Ear Ear  Preliminary Report (23 @ 11:37):    No growth    Culture - Blood (collected 23 @ 17:05)  Source: .Blood Blood-Peripheral  Final Report (23 @ 22:00):    No growth at 5 days        RADIOLOGY & ADDITIONAL STUDIES:    Assessment and Plan:  MILANA BALL is a  73yFemale w/ DM, CVA, HTN admited for resp failure w/trach, bacteremia p/w L OE . Requiring serial debridements, wick replaced today.  - please do not keep dry gauze on pinna, if pus pouring out from ear canal, please send for culture  - Improve FSG control - FSG goal <150  - Continue with  broad spectrum antibiotics- now on meropenem , ciprodex x 10d  - Recommend CT scan of Temporal bone to better assess and ensure no bony destruction/ involvement.      Page ENT with any questions/concerns.  Peds Page #[phone]  Adult Page #[phone]    Urvashi Tee  09-10-23 @ 21:01

## 2023-09-10 NOTE — CHART NOTE - NSCHARTNOTEFT_GEN_A_CORE
Pt with Acute Left Extremity DVT. Pt not currently on AC d/t anemia 2/2 GIB. Spoke with pulm fellow who recommends Subq heparin 5000U q8h. SCDs discontinued. Will discuss with GI team in AM about further anticoagulation. Monitor for signs of bleeding, trend Hgb.     LE Venous US Duplex: IMPRESSION: Acute left deep vein thrombosis of the left popliteal vein.

## 2023-09-10 NOTE — PROGRESS NOTE ADULT - SUBJECTIVE AND OBJECTIVE BOX
Subjective: Patient seen and examined. No new events except as noted.     REVIEW OF SYSTEMS:    Unable to obtain     MEDICATIONS:  MEDICATIONS  (STANDING):  albuterol/ipratropium for Nebulization 3 milliLiter(s) Nebulizer every 6 hours  atorvastatin 40 milliGRAM(s) Oral at bedtime  chlorhexidine 0.12% Liquid 15 milliLiter(s) Oral Mucosa every 12 hours  chlorhexidine 2% Cloths 1 Application(s) Topical daily  ciprofloxacin/hydrocortisone Suspension Otic 4 Drop(s) Left Ear two times a day  dextrose 5%. 1000 milliLiter(s) (50 mL/Hr) IV Continuous <Continuous>  dextrose 5%. 1000 milliLiter(s) (100 mL/Hr) IV Continuous <Continuous>  dextrose 50% Injectable 12.5 Gram(s) IV Push once  dextrose 50% Injectable 25 Gram(s) IV Push once  dextrose 50% Injectable 25 Gram(s) IV Push once  doxazosin 6 milliGRAM(s) Oral <User Schedule>  ferrous    sulfate Liquid 300 milliGRAM(s) Enteral Tube daily  FIRST- Mouthwash  BLM 10 milliLiter(s) Swish and Spit four times a day  glucagon  Injectable 1 milliGRAM(s) IntraMuscular once  insulin lispro (ADMELOG) corrective regimen sliding scale   SubCutaneous every 6 hours  insulin NPH human recombinant 3 Unit(s) SubCutaneous every 6 hours  meropenem  IVPB 500 milliGRAM(s) IV Intermittent every 12 hours  pantoprazole  Injectable 40 milliGRAM(s) IV Push two times a day  predniSONE   Tablet   Oral   sevelamer carbonate Powder 1600 milliGRAM(s) Oral three times a day  sodium chloride 3%  Inhalation 4 milliLiter(s) Inhalation every 6 hours      PHYSICAL EXAM:  T(C): 38.9 (09-10-23 @ 04:00), Max: 38.9 (09-10-23 @ 04:00)  HR: 93 (09-10-23 @ 07:37) (88 - 104)  BP: 135/76 (09-10-23 @ 04:00) (108/77 - 145/79)  RR: 22 (09-10-23 @ 04:00) (18 - 22)  SpO2: 98% (09-10-23 @ 07:37) (93% - 100%)  Wt(kg): --  I&O's Summary    09 Sep 2023 07:01  -  10 Sep 2023 07:00  --------------------------------------------------------  IN: 1460 mL / OUT: 2900 mL / NET: -1440 mL            Appearance: NAD, +trach  HEENT: dry oral mucosa  Lymphatic: No lymphadenopathy  Cardiovascular: Normal S1 S2, No JVD, No murmurs, No edema  Respiratory: Decreased BS, + trach to vent	  Neuro: opens eyes  Gastrointestinal: Soft, Non-tender, + BS, + OGT	  Skin: No rashes, No ecchymoses, No cyanosis	  Extremities: No strength/ROM 2/2 sedation, + BL LE edema  Vascular: Peripheral pulses palpable 2+ bilaterally      LABS:    CARDIAC MARKERS:                                6.7    9.02  )-----------( 178      ( 10 Sep 2023 06:52 )             21.0     09-10    131<L>  |  96<L>  |  54<H>  ----------------------------<  185<H>  3.9   |  24  |  1.97<H>    Ca    7.7<L>      10 Sep 2023 06:52  Phos  3.1     09-10  Mg     2.10     09-10      proBNP:   Lipid Profile:   HgA1c:   TSH:     Trace          TELEMETRY: 	    ECG:  	  RADIOLOGY:   DIAGNOSTIC TESTING:  [ ] Echocardiogram:  [ ]  Catheterization:  [ ] Stress Test:    OTHER:

## 2023-09-10 NOTE — CHART NOTE - NSCHARTNOTEFT_GEN_A_CORE
:  Ac Reynolds Fellow     INDICATION:    [x] Acute Hypoxic Respiratory failure    [ ] Other:       PROCEDURE:     [x] LIMITED CHEST    [ ] LIMITED ABDOMINAL    [ ] OTHER      FINDINGS:     Pulmonary:   Left: A lines throughout. No pleural Effusion   Right: A lines anteriorly. Small right pleural effusion. Effusion is posterior in location.     INTERPRETATION:  Small right posterior pleural effusion which has decreased in size as compared to yesterday.     Images stored on [employer] :  Ac Reynolds Fellow     INDICATION:    [x] Acute Hypoxic Respiratory failure    [ ] Other:       PROCEDURE:     [x] LIMITED CHEST    [ ] LIMITED ABDOMINAL    [ ] OTHER      FINDINGS:     Pulmonary:   Left: A lines throughout. No pleural Effusion   Right: A lines anteriorly. Small right pleural effusion. Effusion is posterior in location.     INTERPRETATION:  Small right posterior pleural effusion which has decreased in size as compared to yesterday.     Images stored on [employer]    Attending note :  I was present for the duration of the procedure and supervised as needed.

## 2023-09-10 NOTE — PROGRESS NOTE ADULT - ASSESSMENT
IMPRESSION: 73F w/ HTN, DM2, CVA, breast CA-bilateral mastectomy, recurrent aspiration pneumonia/respiratory failure, and CKD, 8/11/23 p/w acute hypercapnic respiratory failure; c/b RUSS    (1)CKD - stage 4     (2)RUSS - ATN vs AIN -BUN and creatinine elevated w/ electrolyte derangements, (hyponatremia, hyperkalemia, hyperphosphatemia). Now on HD. improving on HD    (3)Hyponatremia - worsening as of yesterday. Na 131    (4)Hyperkalemia - WNL    (5)Pulm- hypercapnic respiratory failure on admission - now s/p trach; remains on vent     (6)CV - normotensive     (7)ID - on IV Meropenem/Vanco. BCx w/ NGTD    RECOMMEND:    (1)HD yesterday 9/9: Removed 1 L. Will reassess in Am for HD  (2)Prednisone taper, S/p 20mg PO x 2 days-> now 10mg po qd x3 days  (3)Dose new meds for GFR <10/HD    Lizzy Harp NP  [facility]  [phone]        IMPRESSION: 73F w/ HTN, DM2, CVA, breast CA-bilateral mastectomy, recurrent aspiration pneumonia/respiratory failure, and CKD, 8/11/23 p/w acute hypercapnic respiratory failure; c/b RUSS    (1)CKD - stage 4     (2)RUSS - ATN vs AIN -BUN and creatinine elevated w/ electrolyte derangements, (hyponatremia, hyperkalemia, hyperphosphatemia). Improving on HD    (3)Hyponatremia - worsening as of yesterday. Na 131    (4)Hyperkalemia - WNL    (5)Pulm- hypercapnic respiratory failure on admission - now s/p trach; remains on vent     (6)CV - normotensive     (7)ID - on IV Meropenem/Vanco. BCx w/ NGTD    RECOMMEND:  (1)Will assess for HD in the a.m.  (2)Prednisone taper, S/p 20mg PO x 2 days-> now 10mg po qd x3 days  (3)Dose new meds for GFR <10/HD    Nilda Espinoza DNP   [facility]  [phone]

## 2023-09-10 NOTE — PROGRESS NOTE ADULT - SUBJECTIVE AND OBJECTIVE BOX
Patient is a 73y old  Female who presents with a chief complaint of Respiratory distress (10 Sep 2023 21:01)      SUBJECTIVE / OVERNIGHT EVENTS: spiked 102 overnight through [place] and Vanco, purulent DC from O. externa, ENT recommend Ct of mastoid. ptn in HD, has Shiley in place, consider pulling shiley and send for Cx. would d/w Renal, and consider contacting  ID, last seen by Dr. Conner 9/6    MEDICATIONS  (STANDING):  albuterol/ipratropium for Nebulization 3 milliLiter(s) Nebulizer every 6 hours  atorvastatin 40 milliGRAM(s) Oral at bedtime  chlorhexidine 0.12% Liquid 15 milliLiter(s) Oral Mucosa every 12 hours  chlorhexidine 2% Cloths 1 Application(s) Topical daily  ciprofloxacin/hydrocortisone Suspension Otic 4 Drop(s) Left Ear two times a day  dextrose 5%. 1000 milliLiter(s) (50 mL/Hr) IV Continuous <Continuous>  dextrose 5%. 1000 milliLiter(s) (100 mL/Hr) IV Continuous <Continuous>  dextrose 50% Injectable 25 Gram(s) IV Push once  dextrose 50% Injectable 25 Gram(s) IV Push once  dextrose 50% Injectable 12.5 Gram(s) IV Push once  doxazosin 6 milliGRAM(s) Oral <User Schedule>  ferrous    sulfate Liquid 300 milliGRAM(s) Enteral Tube daily  FIRST- Mouthwash  BLM 10 milliLiter(s) Swish and Spit four times a day  glucagon  Injectable 1 milliGRAM(s) IntraMuscular once  heparin   Injectable 5000 Unit(s) SubCutaneous every 8 hours  insulin lispro (ADMELOG) corrective regimen sliding scale   SubCutaneous every 6 hours  insulin NPH human recombinant 3 Unit(s) SubCutaneous every 6 hours  meropenem  IVPB 500 milliGRAM(s) IV Intermittent every 12 hours  mupirocin 2% Ointment 1 Application(s) Both Nostrils two times a day  pantoprazole  Injectable 40 milliGRAM(s) IV Push two times a day  predniSONE   Tablet   Oral   sevelamer carbonate Powder 1600 milliGRAM(s) Oral three times a day  sodium chloride 3%  Inhalation 4 milliLiter(s) Inhalation every 6 hours    MEDICATIONS  (PRN):  acetaminophen   Oral Liquid .. 650 milliGRAM(s) Oral every 6 hours PRN Temp greater or equal to 38C (100.4F), Mild Pain (1 - 3)  dextrose Oral Gel 15 Gram(s) Oral once PRN Blood Glucose LESS THAN 70 milliGRAM(s)/deciliter      Vital Signs Last 24 Hrs  T(F): 101 (09-10-23 @ 21:00), Max: 102 (09-10-23 @ 04:00)  HR: 91 (09-10-23 @ 20:00) (88 - 113)  BP: 130/55 (09-10-23 @ 20:00) (116/53 - 140/73)  RR: 24 (09-10-23 @ 20:00) (20 - 26)  SpO2: 99% (09-10-23 @ 20:00) (93% - 100%)  Telemetry:   CAPILLARY BLOOD GLUCOSE      POCT Blood Glucose.: 244 mg/dL (10 Sep 2023 18:20)  POCT Blood Glucose.: 221 mg/dL (10 Sep 2023 11:22)  POCT Blood Glucose.: 203 mg/dL (10 Sep 2023 05:15)  POCT Blood Glucose.: 115 mg/dL (09 Sep 2023 23:19)    I&O's Summary    09 Sep 2023 07:01  -  10 Sep 2023 07:00  --------------------------------------------------------  IN: 1460 mL / OUT: 2900 mL / NET: -1440 mL        PHYSICAL EXAM:  GENERAL: NAD, well-developed  HEAD:  Atraumatic, Normocephalic  EYES: EOMI, PERRLA, conjunctiva and sclera clear  NECK: Supple, No JVD  CHEST/LUNG: Clear to auscultation bilaterally; No wheeze  HEART: Regular rate and rhythm; No murmurs, rubs, or gallops  ABDOMEN: Soft, Nontender, Nondistended; Bowel sounds present  EXTREMITIES:  2+ Peripheral Pulses, No clubbing, cyanosis, or edema  PSYCH: AAOx3  NEUROLOGY: non-focal  SKIN: No rashes or lesions    LABS:                        8.0    11.56 )-----------( 221      ( 10 Sep 2023 08:15 )             25.0     09-10    131<L>  |  96<L>  |  54<H>  ----------------------------<  185<H>  3.9   |  24  |  1.97<H>    Ca    7.7<L>      10 Sep 2023 06:52  Phos  3.1     09-10  Mg     2.10     09-10      PT/INR - ( 09 Sep 2023 13:30 )   PT: 10.7 sec;   INR: 0.95 ratio         PTT - ( 09 Sep 2023 13:30 )  PTT:26.6 sec      Urinalysis Basic - ( 10 Sep 2023 06:52 )    Color: x / Appearance: x / SG: x / pH: x  Gluc: 185 mg/dL / Ketone: x  / Bili: x / Urobili: x   Blood: x / Protein: x / Nitrite: x   Leuk Esterase: x / RBC: x / WBC x   Sq Epi: x / Non Sq Epi: x / Bacteria: x        RADIOLOGY & ADDITIONAL TESTS:    Imaging Personally Reviewed:    Consultant(s) Notes Reviewed:      Care Discussed with Consultants/Other Providers:

## 2023-09-10 NOTE — PROGRESS NOTE ADULT - ASSESSMENT
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 While in MICU patient noted with worsening renal failure requiring HD initiation, CGE/ Melena s/p EGD 8/31 found with esophagitis and erosive gastropathy, new right cerebellar infarct and fevers second to ESBL COLI and MSSA VAP, MSSA bacteremia, left ear otitis externa and drug rxn to cephalosporins Course further complicated by prolonged vent time s/p tracheostomy 9/1 and transferred to RCU 9/3 completed by recurrent fevers with high PIP, respiratory acidosis and concern for volume overloaded.     NEUROLOGY  # Metabolic Encephalopath vs NEW cerebella infarct   - Baseline MS AOX3 with short term memory changes per family however noted with concern for poor mentation in ICU  - MRI (8/17) with NEW right cerebellar infarct and old left PCA/occipital infarcts  - STEPHANIE (8/17) with AV showing two linear mobile echogenic elements attached to valve leaflets consistent with Lambel's excrescence, but no TRINITY thrombus, and lambel's excrescence with uncertain clinical implication.   - EEG (8/11-8/15) with no seizures   - ILR interrogation (9/7) with SR and PACs falsely recorded as AF.   - Attempted to resume ASA for medical management but held given GIB   - Continue on lipitor for medical management   - Course complicated by questionable head and body shaking 9/8 however prolactin elevated and shakes head yes/no to some questions.   - Lethargy more likely second to respiratory acidosis and will hold on RPT EEG    - Monitor mentation closely     CARDIOVASCULAR  # HTN Urgency   # Septic vs vasoplegic shock   - Labile BPs ranging in between SBP 80s-200s and attempted labetalol, however noted with hypotension and bradycardia likely from BB toxicity vs shock state?    - Holding Labetalol and Catapres   - Continue on Cardura for now however if BP soft preventing fluid removal then hold Cardura     # Hx of HFpEF with likely ADHF with volume overload.   - ECHO (7/2023) with EF 59 with severe LVH and diastolic dysfunction   - STEPHANIE (8/18) with normal LVRVSF however noted with increased LA pressures and persistent LA septal bowing into RA.   - ECHO (8/11) with EF 60 with mild LVH, normal LVRVSF, and mild ddfxn.  - Remove volume with HD sessions and intermittent bumex pushes as BP allows       HEENT   # Left ear OE   - ENT evaluation (9/7) with no mastoid tenderness, however found with positive swelling and excoriation to left auricle and tenderness to manipulation of auricle. Debrided crusting of auricle and EAC evaluated with edema and unable to visualize TM. EAC debrided and found with watery exudate.   - Continue on CiproHC (9/5 - )   - Continue on Bradford (9/1 - ) as below   - ENT following and ear wick change QD    # Oral Lesions with questionable Zoster vs Trauma?   - Patient with tongue pain and seen with anterior ulcerations consolidating and crusting and posterior ulceration.  - Case discussed with pathology resident and culture/ cytology sent.   - HSV negative and Path (9/6) with normal appearing epithelial cells singly and in clusters devoid of viral cytopathic effect.   - Continue on magic mouth wash for pain    RESPIRATORY  # AHHRF second to volume overload   - Hx of CKD and HFpEF presented with SOB and hypercarbia second to volume overload requiring intubation and HD initiation   - Vent weaning attempted however limited requiring tracheostomy (9/1) with IP   - Course complicated by PIPs elevated (50s) and respiratory acidosis second to secretions vs volume ?    - Vent adjusted 20/300/5/30 without improvement.   - POCUS (9/8) with BL pleural effusions (large on right and small on left)   - CT CHEST (9/8) with TBM, BL pleural effusions and continued consolidations   - Continue on vent and given TBM increased PEEP (9/9) to 20/300/8/40 with overall improvement   - Continue on duonebs and HTS for airway clearance   - Continue volume removal with diuresis and aggressive UF sessions.   - Discussing possible thora but appears improved and will monitor.       GI  # UGIB   - CGE x 1 episode on 8/24 and melena x 2 episodes on 8/31 likely residual blood.   - EGD (8/31) found with esophagitis and erosive gastropathy  - Continue on PPI BID through late October and then PPI QD     # Dysphagia   - NGT-TF continued   - Pending PEG however needs improvement in infectious status prior to placement.     RENAL  # Progressive CKD   - Presented volume overload and progressive RUSS on CKD (baseline CRE in 2s and mode into the 3s on admission) likely obstruction vs low flow state with shock vs AIN from drug reaction issue?  - POCUS with no signs of hydronephrosis   - Pressor support started with improvement in renal function  - Attempted steroid course in ICU without improvement in renal function   - Case discussed at length with renal and initiated on HD in ICU and now continued on HD TTHS, however remains volume overloaded.   - Patient oliguric however responds to Bumex pushes   - Continue on aggressive UF sessions with HD TTHS and bumex pushes as BP allows   - Monitor renal function and UOP     # Hyperphosphatemia   - PTH normal and elevated phos likely from renal failure  - Phos binder with Renvela started with improvement.     INFECTIOUS DISEASE  # ESBL ECOLI and MSSA VAP c/b MSSA bacteremia   - RVP/ COVID (8/11) negative   - SCx (8/11) with MSSA s/p cefepime (8/12-8/13) and then ancef (8/14) however noted with drug reaction and then changed to vanco and aztreonam (8/12-8/13) in ICU .   - Course complicated by recurrent fevers and return of MSSA with bacteremia.   - SCx (9/1) with MSSA and ESBL ECOLI   - BAL (9/1) with MSSA   - BCx (9/1) with MSSA and cleared on (9/4)   - ECHO (9/6) unable to rule out endocarditis   - Continue on Bradford (9/4 - ) and Vanco BY DOSE POST HD (9/4, 9/7, 9/9 ...) with duration TBD at this time.   - Given inability to rule out endocarditis will discuss possible 4 wks?   - Course complicated by fever (9/7) and UA with leuks but no bacteria, however compared to prior improved, RVP/COVID and SCx negative, and RPT CXR similar to prior   - Pending BCx, SCx, UCx, and LE DOPPLERS    # Left ear OE   - ENT evaluation (9/7) with no mastoid tenderness, however found with positive swelling and excoriation to left auricle and tenderness to manipulation of auricle. Debrided crusting of auricle and EAC evaluated with edema and unable to visualize TM. EAC debrided and found with watery exudate.   - Continue on CiproHC (9/5 - )   - Continue on Bradford (9/1 - ) as below   - Given fevers pending CT TEMPORAL BONE     # MRSA PCRs   - MRSA PCR (8/11 and 8/14) negative   - Next MRSA PCR ordered for 9/10     HEME  # Anemia second to GIB vs renal disease   - Hold chemical DVT PPX given bleeding/ waxing and waning HH   - s/p multiple units of PRBCs (8/15, 8/21, 8/28, 8/31 and 9/8)   - Anemia panel with AOCD but with low %sat and ferrous sulfate added to optimize   - Monitor HH and discuss with renal for EPO sometime next week.     ONC   # Hx of Breast CA   - Patient dx in 2018 and s/p BL mastectomy (radical on right) and chemo and RT.   - Supportive care.     ENDOCRINE  # IDDM2   - Continue on NPH 3U and ISS   - Monitor FS and adjust as needed     LINES/ TUBES  - R ARTHUR TAYLOR (8/19 - )     SKIN  # Drug Eruption   - Attempted cefepime (8/12) vs ancef (8/13-8/14) and then noted with drug rxn.   - Hold further cephalosporins at this time   - Continue on steroids with prednisone 40 (8/18 - 9/2) then prednisone 30 (9/3-9/7), then prednisone 20 (9/8 - 9/10), then prednisone 10mg (9/11-9/13) then turn off.     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 While in MICU patient noted with worsening renal failure requiring HD initiation, CGE/ Melena s/p EGD 8/31 found with esophagitis and erosive gastropathy, new right cerebellar infarct and fevers second to ESBL COLI and MSSA VAP, MSSA bacteremia, left ear otitis externa and drug rxn to cephalosporins Course further complicated by prolonged vent time s/p tracheostomy 9/1 and transferred to RCU 9/3 completed by recurrent fevers with high PIP, respiratory acidosis and concern for volume overloaded.     NEUROLOGY  # Metabolic Encephalopath vs NEW cerebella infarct   - Baseline MS AOX3 with short term memory changes per family however noted with concern for poor mentation in ICU  - MRI (8/17) with NEW right cerebellar infarct and old left PCA/occipital infarcts  - STEPHANIE (8/17) with AV showing two linear mobile echogenic elements attached to valve leaflets consistent with Lambel's excrescence, but no TRINITY thrombus, and lambel's excrescence with uncertain clinical implication.   - EEG (8/11-8/15) with no seizures   - ILR interrogation (9/7) with SR and PACs falsely recorded as AF.   - Attempted to resume ASA for medical management but held given GIB   - Continue on lipitor for medical management   - Course complicated by questionable head and body shaking 9/8 however prolactin elevated and shakes head yes/no to some questions.   - Lethargy more likely second to respiratory acidosis and will hold on RPT EEG    - Monitor mentation closely     CARDIOVASCULAR  # HTN Urgency   # Septic vs vasoplegic shock   - Labile BPs ranging in between SBP 80s-200s and attempted labetalol, however noted with hypotension and bradycardia likely from BB toxicity vs shock state?    - Holding Labetalol and Catapres   - Continue on Cardura for now however if BP soft preventing fluid removal then hold Cardura     # Hx of HFpEF with likely ADHF with volume overload.   - ECHO (7/2023) with EF 59 with severe LVH and diastolic dysfunction   - STEPHANIE (8/18) with normal LVRVSF however noted with increased LA pressures and persistent LA septal bowing into RA.   - ECHO (8/11) with EF 60 with mild LVH, normal LVRVSF, and mild ddfxn.  - Remove volume with HD sessions and intermittent bumex pushes as BP allows       HEENT   # Left ear OE   - ENT evaluation (9/7) with no mastoid tenderness, however found with positive swelling and excoriation to left auricle and tenderness to manipulation of auricle. Debrided crusting of auricle and EAC evaluated with edema and unable to visualize TM. EAC debrided and found with watery exudate.   - Continue on CiproHC (9/5 - )   - Continue on Bradford (9/1 - ) as below   - ENT following and ear wick change QD    # Oral Lesions with questionable Zoster vs Trauma?   - Patient with tongue pain and seen with anterior ulcerations consolidating and crusting and posterior ulceration.  - Case discussed with pathology resident and culture/ cytology sent.   - HSV negative and Path (9/6) with normal appearing epithelial cells singly and in clusters devoid of viral cytopathic effect.   - Continue on magic mouth wash for pain    RESPIRATORY  # AHHRF second to volume overload   - Hx of CKD and HFpEF presented with SOB and hypercarbia second to volume overload requiring intubation and HD initiation   - Vent weaning attempted however limited requiring tracheostomy (9/1) with IP   - Course complicated by PIPs elevated (50s) and respiratory acidosis second to secretions vs volume ?    - Vent adjusted 20/300/5/30 without improvement.   - POCUS (9/8) with BL pleural effusions (large on right and small on left)   - CT CHEST (9/8) with TBM, BL pleural effusions and continued consolidations   - Continue on vent and given TBM increased PEEP (9/9) to 20/300/8/40 with overall improvement   - Continue on duonebs and HTS for airway clearance   - Continue volume removal with diuresis and aggressive UF sessions.   - Discussing possible thora but appears improved and will monitor.  ]  - Bedside US showing decrease in pleural effusion - hold off thoracentesis 9/10     GI  # UGIB   - CGE x 1 episode on 8/24 and melena x 2 episodes on 8/31 likely residual blood.   - EGD (8/31) found with esophagitis and erosive gastropathy  - Continue on PPI BID through late October and then PPI QD     # Dysphagia   - NGT-TF continued   - Pending PEG however needs improvement in infectious status prior to placement.     RENAL  # Progressive CKD   - Presented volume overload and progressive RUSS on CKD (baseline CRE in 2s and mode into the 3s on admission) likely obstruction vs low flow state with shock vs AIN from drug reaction issue?  - POCUS with no signs of hydronephrosis   - Pressor support started with improvement in renal function  - Attempted steroid course in ICU without improvement in renal function   - Case discussed at length with renal and initiated on HD in ICU and now continued on HD TTHS, however remains volume overloaded.   - Patient oliguric however responds to Bumex pushes   - Continue on aggressive UF sessions with HD TTHS and bumex pushes as BP allows   - Monitor renal function and UOP     # Hyperphosphatemia   - PTH normal and elevated phos likely from renal failure  - Phos binder with Renvela started with improvement.     INFECTIOUS DISEASE  # ESBL ECOLI and MSSA VAP c/b MSSA bacteremia   - RVP/ COVID (8/11) negative   - SCx (8/11) with MSSA s/p cefepime (8/12-8/13) and then ancef (8/14) however noted with drug reaction and then changed to vanco and aztreonam (8/12-8/13) in ICU .   - Course complicated by recurrent fevers and return of MSSA with bacteremia.   - SCx (9/1) with MSSA and ESBL ECOLI   - BAL (9/1) with MSSA   - BCx (9/1) with MSSA and cleared on (9/4)   - ECHO (9/6) unable to rule out endocarditis   - Continue on Bradford (9/4 - ) and Vanco BY DOSE POST HD (9/4, 9/7, 9/9 ...) with duration TBD at this time.   - Given inability to rule out endocarditis will discuss possible 4 wks?   - Course complicated by fever (9/7) and UA with leuks but no bacteria, however compared to prior improved, RVP/COVID and SCx negative, and RPT CXR similar to prior   - Pending BCx, SCx, UCx, and LE DOPPLERS  - Febrile overnight 102 recultured and sent off     # Left ear OE   - ENT evaluation (9/7) with no mastoid tenderness, however found with positive swelling and excoriation to left auricle and tenderness to manipulation of auricle. Debrided crusting of auricle and EAC evaluated with edema and unable to visualize TM. EAC debrided and found with watery exudate.   - Continue on CiproHC (9/5 - )   - Continue on Bradford (9/1 - ) as below   - Given fevers pending CT TEMPORAL BONE     # MRSA PCRs   - MRSA PCR (8/11 and 8/14) negative   - Next MRSA PCR ordered for 9/10     HEME  # Anemia second to GIB vs renal disease   - Hold chemical DVT PPX given bleeding/ waxing and waning HH   - s/p multiple units of PRBCs (8/15, 8/21, 8/28, 8/31 and 9/8)   - Anemia panel with AOCD but with low %sat and ferrous sulfate added to optimize   - Monitor HH and discuss with renal for EPO sometime next week.     ONC   # Hx of Breast CA   - Patient dx in 2018 and s/p BL mastectomy (radical on right) and chemo and RT.   - Supportive care.     ENDOCRINE  # IDDM2   - Continue on NPH 3U and ISS   - Monitor FS and adjust as needed     LINES/ TUBES  - R ARTHUR TAYLOR (8/19 - )     SKIN  # Drug Eruption   - Attempted cefepime (8/12) vs ancef (8/13-8/14) and then noted with drug rxn.   - Hold further cephalosporins at this time   - Continue on steroids with prednisone 40 (8/18 - 9/2) then prednisone 30 (9/3-9/7), then prednisone 20 (9/8 - 9/10), then prednisone 10mg (9/11-9/13) then turn off.     ETHICS/ GOC    - FULL CODE     DISPO - TBD

## 2023-09-10 NOTE — PROGRESS NOTE ADULT - SUBJECTIVE AND OBJECTIVE BOX
CHIEF COMPLAINT: Patient is a 73y old  Female who presents with a chief complaint of Respiratory distress (10 Sep 2023 08:18)      Interval Events:    REVIEW OF SYSTEMS:  [ ] All other systems negative  [ ] Unable to assess ROS because ________    Mode: AC/ CMV (Assist Control/ Continuous Mandatory Ventilation), RR (machine): 20, TV (machine): 300, FiO2: 40, PEEP: 5, ITime: 0.53, MAP: 14, PIP: 36  Arterial Blood Gas:  09-09 @ 13:30  7.37/49/85/28/98.8/2.6  ABG lactate: --  Arterial Blood Gas:  09-08 @ 18:30  7.28/55/76/26/97.0/-1.3  ABG lactate: --      OBJECTIVE:  ICU Vital Signs Last 24 Hrs  T(C): 38.9 (10 Sep 2023 04:00), Max: 38.9 (10 Sep 2023 04:00)  T(F): 102 (10 Sep 2023 04:00), Max: 102 (10 Sep 2023 04:00)  HR: 93 (10 Sep 2023 07:37) (88 - 104)  BP: 135/76 (10 Sep 2023 04:00) (108/77 - 145/79)  BP(mean): 85 (10 Sep 2023 04:00) (85 - 96)  ABP: --  ABP(mean): --  RR: 22 (10 Sep 2023 04:00) (18 - 22)  SpO2: 98% (10 Sep 2023 07:37) (93% - 100%)    O2 Parameters below as of 10 Sep 2023 04:00  Patient On (Oxygen Delivery Method): ventilator    O2 Concentration (%): 40      Mode: AC/ CMV (Assist Control/ Continuous Mandatory Ventilation), RR (machine): 20, TV (machine): 300, FiO2: 40, PEEP: 5, ITime: 0.53, MAP: 14, PIP: 36    09-09 @ 07:01  -  09-10 @ 07:00  --------------------------------------------------------  IN: 1460 mL / OUT: 2900 mL / NET: -1440 mL      CAPILLARY BLOOD GLUCOSE      POCT Blood Glucose.: 203 mg/dL (10 Sep 2023 05:15)      HOSPITAL MEDICATIONS:  MEDICATIONS  (STANDING):  albuterol/ipratropium for Nebulization 3 milliLiter(s) Nebulizer every 6 hours  atorvastatin 40 milliGRAM(s) Oral at bedtime  chlorhexidine 0.12% Liquid 15 milliLiter(s) Oral Mucosa every 12 hours  chlorhexidine 2% Cloths 1 Application(s) Topical daily  ciprofloxacin/hydrocortisone Suspension Otic 4 Drop(s) Left Ear two times a day  dextrose 5%. 1000 milliLiter(s) (50 mL/Hr) IV Continuous <Continuous>  dextrose 5%. 1000 milliLiter(s) (100 mL/Hr) IV Continuous <Continuous>  dextrose 50% Injectable 12.5 Gram(s) IV Push once  dextrose 50% Injectable 25 Gram(s) IV Push once  dextrose 50% Injectable 25 Gram(s) IV Push once  doxazosin 6 milliGRAM(s) Oral <User Schedule>  ferrous    sulfate Liquid 300 milliGRAM(s) Enteral Tube daily  FIRST- Mouthwash  BLM 10 milliLiter(s) Swish and Spit four times a day  glucagon  Injectable 1 milliGRAM(s) IntraMuscular once  insulin lispro (ADMELOG) corrective regimen sliding scale   SubCutaneous every 6 hours  insulin NPH human recombinant 3 Unit(s) SubCutaneous every 6 hours  meropenem  IVPB 500 milliGRAM(s) IV Intermittent every 12 hours  pantoprazole  Injectable 40 milliGRAM(s) IV Push two times a day  predniSONE   Tablet   Oral   sevelamer carbonate Powder 1600 milliGRAM(s) Oral three times a day  sodium chloride 3%  Inhalation 4 milliLiter(s) Inhalation every 6 hours    MEDICATIONS  (PRN):  acetaminophen   Oral Liquid .. 650 milliGRAM(s) Oral every 6 hours PRN Temp greater or equal to 38C (100.4F), Mild Pain (1 - 3)  dextrose Oral Gel 15 Gram(s) Oral once PRN Blood Glucose LESS THAN 70 milliGRAM(s)/deciliter      LABS:                        6.7    9.02  )-----------( 178      ( 10 Sep 2023 06:52 )             21.0     09-10    131<L>  |  96<L>  |  54<H>  ----------------------------<  185<H>  3.9   |  24  |  1.97<H>    Ca    7.7<L>      10 Sep 2023 06:52  Phos  3.1     09-10  Mg     2.10     09-10      PT/INR - ( 09 Sep 2023 13:30 )   PT: 10.7 sec;   INR: 0.95 ratio         PTT - ( 09 Sep 2023 13:30 )  PTT:26.6 sec  Urinalysis Basic - ( 10 Sep 2023 06:52 )    Color: x / Appearance: x / SG: x / pH: x  Gluc: 185 mg/dL / Ketone: x  / Bili: x / Urobili: x   Blood: x / Protein: x / Nitrite: x   Leuk Esterase: x / RBC: x / WBC x   Sq Epi: x / Non Sq Epi: x / Bacteria: x      Arterial Blood Gas:  09-09 @ 13:30  7.37/49/85/28/98.8/2.6  ABG lactate: --  Arterial Blood Gas:  09-08 @ 18:30  7.28/55/76/26/97.0/-1.3  ABG lactate: --    Venous Blood Gas:  09-09 @ 07:50  7.31/58/39/29/71.7  VBG Lactate: 0.8  Venous Blood Gas:  09-08 @ 17:39  7.23/64/38/27/71.7  VBG Lactate: 0.9      PAST MEDICAL & SURGICAL HISTORY:  Breast CA      Diabetes      Stroke      Cardiac arrest      HTN (hypertension)      H/O mastectomy, bilateral          FAMILY HISTORY:  No pertinent family history in first degree relatives        Social History:      RADIOLOGY:  [ ] Reviewed and interpreted by me    PULMONARY FUNCTION TESTS:    EKG: CHIEF COMPLAINT: Patient is a 73y old  Female who presents with a chief complaint of Respiratory distress (10 Sep 2023 08:18)      Interval Events:  at bedside- T 102 overnight -  US done at bedside showing decreased pl effusions     REVIEW OF SYSTEMS:  [x ] Unable to assess ROS because AMS    Mode: AC/ CMV (Assist Control/ Continuous Mandatory Ventilation), RR (machine): 20, TV (machine): 300, FiO2: 40, PEEP: 5, ITime: 0.53, MAP: 14, PIP: 36  Arterial Blood Gas:  09-09 @ 13:30  7.37/49/85/28/98.8/2.6  ABG lactate: --  Arterial Blood Gas:  09-08 @ 18:30  7.28/55/76/26/97.0/-1.3  ABG lactate: --      OBJECTIVE:  ICU Vital Signs Last 24 Hrs  T(C): 38.9 (10 Sep 2023 04:00), Max: 38.9 (10 Sep 2023 04:00)  T(F): 102 (10 Sep 2023 04:00), Max: 102 (10 Sep 2023 04:00)  HR: 93 (10 Sep 2023 07:37) (88 - 104)  BP: 135/76 (10 Sep 2023 04:00) (108/77 - 145/79)  BP(mean): 85 (10 Sep 2023 04:00) (85 - 96)  ABP: --  ABP(mean): --  RR: 22 (10 Sep 2023 04:00) (18 - 22)  SpO2: 98% (10 Sep 2023 07:37) (93% - 100%)    O2 Parameters below as of 10 Sep 2023 04:00  Patient On (Oxygen Delivery Method): ventilator    O2 Concentration (%): 40      Mode: AC/ CMV (Assist Control/ Continuous Mandatory Ventilation), RR (machine): 20, TV (machine): 300, FiO2: 40, PEEP: 5, ITime: 0.53, MAP: 14, PIP: 36    09-09 @ 07:01  -  09-10 @ 07:00  --------------------------------------------------------  IN: 1460 mL / OUT: 2900 mL / NET: -1440 mL      CAPILLARY BLOOD GLUCOSE      POCT Blood Glucose.: 203 mg/dL (10 Sep 2023 05:15)      HOSPITAL MEDICATIONS:  MEDICATIONS  (STANDING):  albuterol/ipratropium for Nebulization 3 milliLiter(s) Nebulizer every 6 hours  atorvastatin 40 milliGRAM(s) Oral at bedtime  chlorhexidine 0.12% Liquid 15 milliLiter(s) Oral Mucosa every 12 hours  chlorhexidine 2% Cloths 1 Application(s) Topical daily  ciprofloxacin/hydrocortisone Suspension Otic 4 Drop(s) Left Ear two times a day  dextrose 5%. 1000 milliLiter(s) (50 mL/Hr) IV Continuous <Continuous>  dextrose 5%. 1000 milliLiter(s) (100 mL/Hr) IV Continuous <Continuous>  dextrose 50% Injectable 12.5 Gram(s) IV Push once  dextrose 50% Injectable 25 Gram(s) IV Push once  dextrose 50% Injectable 25 Gram(s) IV Push once  doxazosin 6 milliGRAM(s) Oral <User Schedule>  ferrous    sulfate Liquid 300 milliGRAM(s) Enteral Tube daily  FIRST- Mouthwash  BLM 10 milliLiter(s) Swish and Spit four times a day  glucagon  Injectable 1 milliGRAM(s) IntraMuscular once  insulin lispro (ADMELOG) corrective regimen sliding scale   SubCutaneous every 6 hours  insulin NPH human recombinant 3 Unit(s) SubCutaneous every 6 hours  meropenem  IVPB 500 milliGRAM(s) IV Intermittent every 12 hours  pantoprazole  Injectable 40 milliGRAM(s) IV Push two times a day  predniSONE   Tablet   Oral   sevelamer carbonate Powder 1600 milliGRAM(s) Oral three times a day  sodium chloride 3%  Inhalation 4 milliLiter(s) Inhalation every 6 hours    MEDICATIONS  (PRN):  acetaminophen   Oral Liquid .. 650 milliGRAM(s) Oral every 6 hours PRN Temp greater or equal to 38C (100.4F), Mild Pain (1 - 3)  dextrose Oral Gel 15 Gram(s) Oral once PRN Blood Glucose LESS THAN 70 milliGRAM(s)/deciliter      LABS:                        6.7    9.02  )-----------( 178      ( 10 Sep 2023 06:52 )             21.0     09-10    131<L>  |  96<L>  |  54<H>  ----------------------------<  185<H>  3.9   |  24  |  1.97<H>    Ca    7.7<L>      10 Sep 2023 06:52  Phos  3.1     09-10  Mg     2.10     09-10      PT/INR - ( 09 Sep 2023 13:30 )   PT: 10.7 sec;   INR: 0.95 ratio         PTT - ( 09 Sep 2023 13:30 )  PTT:26.6 sec  Urinalysis Basic - ( 10 Sep 2023 06:52 )    Color: x / Appearance: x / SG: x / pH: x  Gluc: 185 mg/dL / Ketone: x  / Bili: x / Urobili: x   Blood: x / Protein: x / Nitrite: x   Leuk Esterase: x / RBC: x / WBC x   Sq Epi: x / Non Sq Epi: x / Bacteria: x      Arterial Blood Gas:  09-09 @ 13:30  7.37/49/85/28/98.8/2.6  ABG lactate: --  Arterial Blood Gas:  09-08 @ 18:30  7.28/55/76/26/97.0/-1.3  ABG lactate: --    Venous Blood Gas:  09-09 @ 07:50  7.31/58/39/29/71.7  VBG Lactate: 0.8  Venous Blood Gas:  09-08 @ 17:39  7.23/64/38/27/71.7  VBG Lactate: 0.9      PAST MEDICAL & SURGICAL HISTORY:  Breast CA      Diabetes      Stroke      Cardiac arrest      HTN (hypertension)      H/O mastectomy, bilateral          FAMILY HISTORY:  No pertinent family history in first degree relatives        Social History:      RADIOLOGY:  [ ] Reviewed and interpreted by me    PULMONARY FUNCTION TESTS:    EKG:

## 2023-09-10 NOTE — PROGRESS NOTE ADULT - ASSESSMENT
74 y/o F well known to me from my housecal outpt practice. she was admitted at [facility] 7/12-7/22 w aspiration PNA, was treated w CEFEPIME, developed an allergic rash,  dCHF, + MAC on AFB culture, had been progressively getting more and more lethargic and dyspneic at home since DC.   In  am of 8/11/23  ptn presented with respiratory distress w hypoxia and hypercarbia requiring intubation 2/2 volume overload +/- Asp PNA      Neuro   responds appropriately   Baseline MS AOx3, aphasic   - h/o CVA , on aspirin and statin . 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 Mcnulty, now in RCU  s/p septic shock overnight 9/1, now on meropenem, HDS  s/p trache, on vent support, copious secretions      Renal   Hyperkalemia with EKG changes  , initially treated w LOKELMA,  now resolved   Ptn well know to Dr. Colon, consult reviewed    HD 8/19,  8/21, 8/26 and 8/28.  s/p PUF 8/29 2.5 Liters, HD 8/30,  9/1, 9/4  started chronic HD 9/7,  Tue, Thu, Sat,  cardura DCed in order to tolerate fluid removal      GI  NPO  on tube feeds  coffee ground emesis x 1 8/24, melena overnight 8/31, s/p 1UPRBC, EGD/Colonoscopy: erosive gastropathy, esophagisits, on colonoscopy old blood seen no active bleeding, poor prep  family to decide re PEG placement    Endocrine  T2DM   A1C 7.1 in 7/2023   - BG goal 140-200     ID   No fever/leukocytosis, recheck temp   Hx latent TB which was treated, no concern for TB   - grew MAC on AFB culture from most recent admission. at [facility]  -MSSA Bacteremia 9/1, allergic to cephalosprins and PCN, on Vanco as per ID, renal dosing,   - sputum also grew E.Coli-ESBL, as per ID recs for  [facility] through 9/7( 1 week course as per ID) , afebrile.  - 9/8:  spike  temp 38.1C, has O. externa, has a wick, recs are to get a CT to R/O an abscess/bony involvement. cont Meropenem  9/9: ptn w fever overnight to 100.8,  may need shiley pulled if bacteremic, needs NECK CT as per ENT to R/O Mastoid bone involvement while having ongoing purulent DC from L ear 2/2 O. externa, getting daily wick changes  9/10:  spiked 102 overnight through [facility] and Vanco, purulent DC from O. externa, ENT recommend Ct of mastoid. ptn in HD, has Shiley in place, consider pulling shiley and send for Cx. would d/w Renal, and consider contacting  ID, last seen by Dr. Conner 9/6            Heme/Onc  ppx: place on SCD  Transfuse for hgb 6.4, no obv bleed. S    Ethics  GOC - Discussed GOC with daughter and , they have opted in the past for full code. and she remains full code at present

## 2023-09-10 NOTE — PROGRESS NOTE ADULT - NS ATTEND AMEND GEN_ALL_CORE FT
agree with above    already on ana cristina and vanco (HD).  will need   possible drainage of the right pleural fluid.     Time-based billing (NON-critical care).

## 2023-09-11 NOTE — PROGRESS NOTE ADULT - ASSESSMENT
72 y/o F well known to me from my housecal outpt practice. she was admitted at [facility] 7/12-7/22 w aspiration PNA, was treated w CEFEPIME, developed an allergic rash,  dCHF, + MAC on AFB culture, had been progressively getting more and more lethargic and dyspneic at home since DC.   In  am of 8/11/23  ptn presented with respiratory distress w hypoxia and hypercarbia requiring intubation 2/2 volume overload +/- Asp PNA      Neuro   responds appropriately   Baseline MS AOx3, aphasic   - h/o CVA , on aspirin and statin . 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 Mcnulty, now in RCU  s/p septic shock overnight 9/1, now on meropenem, HDS  s/p trache, on vent support, copious secretions      Renal   Hyperkalemia with EKG changes  , initially treated w LOKELMA,  now resolved   Ptn well know to Dr. Colon, consult reviewed    HD 8/19,  8/21, 8/26 and 8/28.  s/p PUF 8/29 2.5 Liters, HD 8/30,  9/1, 9/4  started chronic HD 9/7,  Tue, Thu, Sat,  cardura DCed in order to tolerate fluid removal      GI  NPO  on tube feeds  coffee ground emesis x 1 8/24, melena overnight 8/31, s/p 1UPRBC, EGD/Colonoscopy: erosive gastropathy, esophagisits, on colonoscopy old blood seen no active bleeding, poor prep  family to decide re PEG placement    Endocrine  T2DM   A1C 7.1 in 7/2023   - BG goal 140-200     ID   No fever/leukocytosis, recheck temp   Hx latent TB which was treated, no concern for TB   - grew MAC on AFB culture from most recent admission. at [facility]  -MSSA Bacteremia 9/1, allergic to cephalosprins and PCN, on Vanco as per ID, renal dosing,   - sputum also grew E.Coli-ESBL, as per ID recs for  [facility] through 9/7( 1 week course as per ID) , afebrile.  - 9/8:  spike  temp 38.1C, has O. externa, has a wick, recs are to get a CT to R/O an abscess/bony involvement. cont Meropenem  9/9: ptn w fever overnight to 100.8,  may need shiley pulled if bacteremic, needs NECK CT as per ENT to R/O Mastoid bone involvement while having ongoing purulent DC from L ear 2/2 O. externa, getting daily wick changes  9/10:  spiked 102 overnight through [facility] and Vanco, purulent DC from O. externa, ENT recommend Ct of mastoid. ptn in HD, has Shiley in place, consider pulling shiley and send for Cx. would d/w Renal, and consider contacting  ID, last seen by Dr. Conner 9/6 9/11: remains febrile, Dr. Conner reconsulted. cont [place], Vanco            Heme/Onc  ppx: place on SCD  Transfuse for hgb 6.4, no obv bleed. HDS    Ethics  GOC - Discussed GOC with daughter and , they have opted in the past for full code. and she remains full code at present           72 y/o F well known to me from my housecal outpt practice. she was admitted at [facility] 7/12-7/22 w aspiration PNA, was treated w CEFEPIME, developed an allergic rash,  dCHF, + MAC on AFB culture, had been progressively getting more and more lethargic and dyspneic at home since DC.   In  am of 8/11/23  ptn presented with respiratory distress w hypoxia and hypercarbia requiring intubation 2/2 volume overload +/- Asp PNA      Neuro   responds appropriately   Baseline MS AOx3, aphasic   - h/o CVA , on aspirin and statin . 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 Mcnulty, now in RCU  s/p septic shock overnight 9/1, now on meropenem, HDS  s/p trache, on vent support, copious secretions      Renal   Hyperkalemia with EKG changes  , initially treated w LOKELMA,  now resolved   Ptn well know to Dr. Colon, consult reviewed    HD 8/19,  8/21, 8/26 and 8/28.  s/p PUF 8/29 2.5 Liters, HD 8/30,  9/1, 9/4  started chronic HD 9/7,  Tue, Thu, Sat,  cardura DCed in order to tolerate fluid removal      GI  NPO  on tube feeds  coffee ground emesis x 1 8/24, melena overnight 8/31, s/p 1UPRBC, EGD/Colonoscopy: erosive gastropathy, esophagisits, on colonoscopy old blood seen no active bleeding, poor prep  family to decide re PEG placement    Endocrine  T2DM   A1C 7.1 in 7/2023   - BG goal 140-200     ID   No fever/leukocytosis, recheck temp   Hx latent TB which was treated, no concern for TB   - grew MAC on AFB culture from most recent admission. at [facility]  -MSSA Bacteremia 9/1, allergic to cephalosprins and PCN, on Vanco as per ID, renal dosing,   - sputum also grew E.Coli-ESBL, as per ID recs for  [facility] through 9/7( 1 week course as per ID) , afebrile.  - 9/8:  spike  temp 38.1C, has O. externa, has a wick, recs are to get a CT to R/O an abscess/bony involvement. cont Meropenem  9/9: ptn w fever overnight to 100.8,  may need shiley pulled if bacteremic, needs NECK CT as per ENT to R/O Mastoid bone involvement while having ongoing purulent DC from L ear 2/2 O. externa, getting daily wick changes  9/10:  spiked 102 overnight through [facility] and Vanco, purulent DC from O. externa, ENT recommend Ct of mastoid. ptn in HD, has Shiley in place, consider pulling shiley and send for Cx. would d/w Renal, and consider contacting  ID, last seen by Dr. Conner 9/6 9/11: remains febrile, Dr. Conner reconsulted. cont [facility], Vanco            Heme/Onc  ppx: place on SCD  Transfuse for hgb 6.4, no obv bleed. HDS  LEFT POPLITEAL VEIN ACUTE DVT on 9/10    Ethics  GOC - Discussed GOC with daughter and , they have opted in the past for full code. and she remains full code at present

## 2023-09-11 NOTE — PROGRESS NOTE ADULT - SUBJECTIVE AND OBJECTIVE BOX
CHIEF COMPLAINT: Patient is a 73y old  Female who presents with a chief complaint of Respiratory distress (11 Sep 2023 07:11)      Interval Events: Seen at bedside, family present + LE DVT     REVIEW OF SYSTEMS:  [x ] Unable to assess ROS because AMS    Mode: AC/ CMV (Assist Control/ Continuous Mandatory Ventilation), RR (machine): 20, TV (machine): 300, FiO2: 40, PEEP: 5, ITime: 0.53, MAP: 11, PIP: 30  Arterial Blood Gas:  09-09 @ 13:30  7.37/49/85/28/98.8/2.6  ABG lactate: --      OBJECTIVE:  ICU Vital Signs Last 24 Hrs  T(C): 38.3 (11 Sep 2023 06:54), Max: 38.3 (10 Sep 2023 21:00)  T(F): 101 (11 Sep 2023 06:54), Max: 101 (10 Sep 2023 21:00)  HR: 100 (11 Sep 2023 04:00) (88 - 113)  BP: 140/64 (11 Sep 2023 04:00) (116/53 - 140/64)  BP(mean): --  ABP: --  ABP(mean): --  RR: 22 (11 Sep 2023 04:00) (20 - 26)  SpO2: 100% (11 Sep 2023 04:00) (97% - 100%)    O2 Parameters below as of 11 Sep 2023 04:00  Patient On (Oxygen Delivery Method): ventilator    O2 Concentration (%): 35      Mode: AC/ CMV (Assist Control/ Continuous Mandatory Ventilation), RR (machine): 20, TV (machine): 300, FiO2: 40, PEEP: 5, ITime: 0.53, MAP: 11, PIP: 30    09-10 @ 07:01  -  09-11 @ 07:00  --------------------------------------------------------  IN: 385 mL / OUT: 0 mL / NET: 385 mL      CAPILLARY BLOOD GLUCOSE      POCT Blood Glucose.: 206 mg/dL (11 Sep 2023 06:13)      HOSPITAL MEDICATIONS:  MEDICATIONS  (STANDING):  albuterol/ipratropium for Nebulization 3 milliLiter(s) Nebulizer every 6 hours  atorvastatin 40 milliGRAM(s) Oral at bedtime  chlorhexidine 0.12% Liquid 15 milliLiter(s) Oral Mucosa every 12 hours  chlorhexidine 2% Cloths 1 Application(s) Topical daily  ciprofloxacin/hydrocortisone Suspension Otic 4 Drop(s) Left Ear two times a day  dextrose 5%. 1000 milliLiter(s) (100 mL/Hr) IV Continuous <Continuous>  dextrose 5%. 1000 milliLiter(s) (50 mL/Hr) IV Continuous <Continuous>  dextrose 50% Injectable 25 Gram(s) IV Push once  dextrose 50% Injectable 25 Gram(s) IV Push once  dextrose 50% Injectable 12.5 Gram(s) IV Push once  doxazosin 6 milliGRAM(s) Oral <User Schedule>  ferrous    sulfate Liquid 300 milliGRAM(s) Enteral Tube daily  FIRST- Mouthwash  BLM 10 milliLiter(s) Swish and Spit four times a day  glucagon  Injectable 1 milliGRAM(s) IntraMuscular once  heparin   Injectable 5000 Unit(s) SubCutaneous every 8 hours  insulin lispro (ADMELOG) corrective regimen sliding scale   SubCutaneous every 6 hours  insulin NPH human recombinant 3 Unit(s) SubCutaneous every 6 hours  meropenem  IVPB 500 milliGRAM(s) IV Intermittent every 12 hours  mupirocin 2% Ointment 1 Application(s) Both Nostrils two times a day  pantoprazole  Injectable 40 milliGRAM(s) IV Push two times a day  predniSONE   Tablet 10 milliGRAM(s) Oral daily  predniSONE   Tablet   Oral   sevelamer carbonate Powder 1600 milliGRAM(s) Oral three times a day  sodium chloride 3%  Inhalation 4 milliLiter(s) Inhalation every 6 hours    MEDICATIONS  (PRN):  acetaminophen   Oral Liquid .. 650 milliGRAM(s) Oral every 6 hours PRN Temp greater or equal to 38C (100.4F), Mild Pain (1 - 3)  dextrose Oral Gel 15 Gram(s) Oral once PRN Blood Glucose LESS THAN 70 milliGRAM(s)/deciliter      LABS:                        8.0    11.56 )-----------( 221      ( 10 Sep 2023 08:15 )             25.0     09-10    131<L>  |  96<L>  |  54<H>  ----------------------------<  185<H>  3.9   |  24  |  1.97<H>    Ca    7.7<L>      10 Sep 2023 06:52  Phos  3.1     09-10  Mg     2.10     09-10      PT/INR - ( 09 Sep 2023 13:30 )   PT: 10.7 sec;   INR: 0.95 ratio         PTT - ( 09 Sep 2023 13:30 )  PTT:26.6 sec  Urinalysis Basic - ( 10 Sep 2023 06:52 )    Color: x / Appearance: x / SG: x / pH: x  Gluc: 185 mg/dL / Ketone: x  / Bili: x / Urobili: x   Blood: x / Protein: x / Nitrite: x   Leuk Esterase: x / RBC: x / WBC x   Sq Epi: x / Non Sq Epi: x / Bacteria: x      Arterial Blood Gas:  09-09 @ 13:30  7.37/49/85/28/98.8/2.6  ABG lactate: --    Venous Blood Gas:  09-09 @ 07:50  7.31/58/39/29/71.7  VBG Lactate: 0.8      PAST MEDICAL & SURGICAL HISTORY:  Breast CA      Diabetes      Stroke      Cardiac arrest      HTN (hypertension)      H/O mastectomy, bilateral          FAMILY HISTORY:  No pertinent family history in first degree relatives        Social History:      RADIOLOGY:  [ ] Reviewed and interpreted by me    PULMONARY FUNCTION TESTS:    EKG:

## 2023-09-11 NOTE — PROGRESS NOTE ADULT - SUBJECTIVE AND OBJECTIVE BOX
Patient is a 73y old  Female who presents with a chief complaint of Respiratory distress (10 Sep 2023 22:01)      SUBJECTIVE / OVERNIGHT EVENTS: ptn remains febrile, Ptn was seen by Dr. Conner by house ID in the past, reconsulted    MEDICATIONS  (STANDING):  albuterol/ipratropium for Nebulization 3 milliLiter(s) Nebulizer every 6 hours  atorvastatin 40 milliGRAM(s) Oral at bedtime  chlorhexidine 0.12% Liquid 15 milliLiter(s) Oral Mucosa every 12 hours  chlorhexidine 2% Cloths 1 Application(s) Topical daily  ciprofloxacin/hydrocortisone Suspension Otic 4 Drop(s) Left Ear two times a day  dextrose 5%. 1000 milliLiter(s) (50 mL/Hr) IV Continuous <Continuous>  dextrose 5%. 1000 milliLiter(s) (100 mL/Hr) IV Continuous <Continuous>  dextrose 50% Injectable 12.5 Gram(s) IV Push once  dextrose 50% Injectable 25 Gram(s) IV Push once  dextrose 50% Injectable 25 Gram(s) IV Push once  doxazosin 6 milliGRAM(s) Oral <User Schedule>  ferrous    sulfate Liquid 300 milliGRAM(s) Enteral Tube daily  FIRST- Mouthwash  BLM 10 milliLiter(s) Swish and Spit four times a day  glucagon  Injectable 1 milliGRAM(s) IntraMuscular once  heparin   Injectable 5000 Unit(s) SubCutaneous every 8 hours  insulin lispro (ADMELOG) corrective regimen sliding scale   SubCutaneous every 6 hours  insulin NPH human recombinant 3 Unit(s) SubCutaneous every 6 hours  meropenem  IVPB 500 milliGRAM(s) IV Intermittent every 12 hours  mupirocin 2% Ointment 1 Application(s) Both Nostrils two times a day  pantoprazole  Injectable 40 milliGRAM(s) IV Push two times a day  predniSONE   Tablet 10 milliGRAM(s) Oral daily  predniSONE   Tablet   Oral   sevelamer carbonate Powder 1600 milliGRAM(s) Oral three times a day  sodium chloride 3%  Inhalation 4 milliLiter(s) Inhalation every 6 hours    MEDICATIONS  (PRN):  acetaminophen   Oral Liquid .. 650 milliGRAM(s) Oral every 6 hours PRN Temp greater or equal to 38C (100.4F), Mild Pain (1 - 3)  dextrose Oral Gel 15 Gram(s) Oral once PRN Blood Glucose LESS THAN 70 milliGRAM(s)/deciliter      Vital Signs Last 24 Hrs  T(F): 101 (09-11-23 @ 06:54), Max: 101 (09-10-23 @ 21:00)  HR: 100 (09-11-23 @ 04:00) (88 - 113)  BP: 140/64 (09-11-23 @ 04:00) (116/53 - 140/64)  RR: 22 (09-11-23 @ 04:00) (20 - 26)  SpO2: 100% (09-11-23 @ 04:00) (97% - 100%)  Telemetry:   CAPILLARY BLOOD GLUCOSE      POCT Blood Glucose.: 206 mg/dL (11 Sep 2023 06:13)  POCT Blood Glucose.: 212 mg/dL (10 Sep 2023 23:45)  POCT Blood Glucose.: 244 mg/dL (10 Sep 2023 18:20)  POCT Blood Glucose.: 221 mg/dL (10 Sep 2023 11:22)    I&O's Summary    10 Sep 2023 07:01  -  11 Sep 2023 07:00  --------------------------------------------------------  IN: 385 mL / OUT: 0 mL / NET: 385 mL        PHYSICAL EXAM:  GENERAL: NAD, well-developed  HEAD:  Atraumatic, Normocephalic  EYES: EOMI, PERRLA, conjunctiva and sclera clear  NECK: Supple, No JVD  CHEST/LUNG: Clear to auscultation bilaterally; No wheeze  HEART: Regular rate and rhythm; No murmurs, rubs, or gallops  ABDOMEN: Soft, Nontender, Nondistended; Bowel sounds present  EXTREMITIES:  2+ Peripheral Pulses, No clubbing, cyanosis, or edema  PSYCH: AAOx3  NEUROLOGY: non-focal  SKIN: No rashes or lesions    LABS:                        8.0    11.56 )-----------( 221      ( 10 Sep 2023 08:15 )             25.0     09-10    131<L>  |  96<L>  |  54<H>  ----------------------------<  185<H>  3.9   |  24  |  1.97<H>    Ca    7.7<L>      10 Sep 2023 06:52  Phos  3.1     09-10  Mg     2.10     09-10      PT/INR - ( 09 Sep 2023 13:30 )   PT: 10.7 sec;   INR: 0.95 ratio         PTT - ( 09 Sep 2023 13:30 )  PTT:26.6 sec      Urinalysis Basic - ( 10 Sep 2023 06:52 )    Color: x / Appearance: x / SG: x / pH: x  Gluc: 185 mg/dL / Ketone: x  / Bili: x / Urobili: x   Blood: x / Protein: x / Nitrite: x   Leuk Esterase: x / RBC: x / WBC x   Sq Epi: x / Non Sq Epi: x / Bacteria: x        RADIOLOGY & ADDITIONAL TESTS:    Imaging Personally Reviewed:    Consultant(s) Notes Reviewed:      Care Discussed with Consultants/Other Providers:

## 2023-09-11 NOTE — CHART NOTE - NSCHARTNOTEFT_GEN_A_CORE
73 year old female with intermittent episodes of epistaxis, DM on insulin, HTN, CKD, CHFpEF, breast CA s/p mastectomy, Latent Tb, respiratory failure and cardiac arrest (2018), CVA with residual weakness, aspiration PNA h/o trach and prior PEG for dysphagia in 2018, both removed who initially presented to the ED with respiratory distress requiring intubation and remains intubated, PNA-Staph aureus, OGT placed with worsening renal function and concerns for ATN vs AIN, started on prednisone 40 on 8/18 /w/o PPI.    #PEG evaluation  #Dark maroon stool, resolved  #Anemia  #AMS  #Acute on chronic respiratory failure  #Otitis externa  -EGD/colonoscopy 8/31/2023 reveals LA Grade D esophagitis. Suspect likely etiology of prior coffee ground emesis.  Non-bleeding erosive gastropathy. No specimens collected.  Normal examined duodenum.  No active source of GI bleeding identified on this exam.  Preparation of the colon was inadequate for screening purposes.  Old red blood was found in the entire colon. This was cleared with fair visualization with no active bleeding source identified on this examination.  There was old red blood in the terminal ileum, but this was cleared with underlying bilious fluid. Suspect likely refluxed blood into TI after enema administration, lower suspicion for small bowel bleed though this is not definitively ruled out.  No specimens collected.    Recommendations:  -trend vitals, CBC, CMP, and monitor for clinical signs of bleeding  -maintain active type and screen  -transfusion goal to maintain hemoglobin >/= 7.0 and platelets >/= 50  -avoid NSAIDs  -PPI IV BID for projected 8 week course followed by once daily  -no contraindication to anticoagulation from GI standpoint  -If patient rebleeds, recommend STAT CTA to identify source with IR consultation if positive for evaluation of embolization    Geremias Olmedo  GI/Hepatology Fellow    MONDAY-FRIDAY 8AM-5PM:  Pager# [phone] ([facility]) or [phone] ([facility])    NON-URGENT CONSULTS:  Please email [email or web address]  AT NIGHT AND ON WEEKENDS:  Contact on-call GI fellow via answering service ([phone]) from 5pm-8am and on weekends/holidays

## 2023-09-11 NOTE — PROGRESS NOTE ADULT - ASSESSMENT
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 family declined renal biopsy started on prednisone  s/p trach 9/1 and BAL with MSSA   ET cx with ESBL and MSSA  started on ana cristina 9/1  blood cx 9/1: MSSA   repeat negative 9/4, 9/8  CXR with b/l opacities, R increased  L ear edema and otitis externa    initial  resp failure for ?fluid ovrer load but also had MSSA in the sputum, got vanco, aztreonam one day then cefepime and had rash, renal failure which was considered due to cefepime and then received 2 days of cefazolin and then off, now with trach and bronc on 9/1 with MSSA bacteremia and BAL also MSSA  another ET cx with MSSA and ESBL E-coli  hypotension, MSSA bacteremia likely due to pneumonia, repeat blood cx negative 9/4, TTE limited unable to exclude endocarditis  L otitis externa on ana cristina since 9/1 but worsening edema and black discoloration with bullae forming and persistent fever (ear cx was negative)    * f/u with ENT for ear debridement  * auditory canal CT  * c/w ana cristina  * cannot do cefazolin so will have to treat with vanco  * if there is plan for regular HD then 500 post HD, but nephro is holding off so would do per level, no level was done today and last dose was over 2 days ago, give a stat dose and check a level tomorrow  * monitor CBC/diff and renal function    The above assessment and plan was discussed with the primary team    Yumiko Conner MD  contact on teams  After 5pm and on weekends call [phone]

## 2023-09-11 NOTE — PROGRESS NOTE ADULT - ASSESSMENT
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 While in MICU patient noted with worsening renal failure requiring HD initiation, CGE/ Melena s/p EGD 8/31 found with esophagitis and erosive gastropathy, new right cerebellar infarct and fevers second to ESBL COLI and MSSA VAP, MSSA bacteremia, left ear otitis externa and drug rxn to cephalosporins Course further complicated by prolonged vent time s/p tracheostomy 9/1 and transferred to RCU 9/3 completed by recurrent fevers with high PIP, respiratory acidosis and concern for volume overloaded.     NEUROLOGY  # Metabolic Encephalopath vs NEW cerebella infarct   - Baseline MS AOX3 with short term memory changes per family however noted with concern for poor mentation in ICU  - MRI (8/17) with NEW right cerebellar infarct and old left PCA/occipital infarcts  - STEPHANIE (8/17) with AV showing two linear mobile echogenic elements attached to valve leaflets consistent with Lambel's excrescence, but no TRINITY thrombus, and lambel's excrescence with uncertain clinical implication.   - EEG (8/11-8/15) with no seizures   - ILR interrogation (9/7) with SR and PACs falsely recorded as AF.   - Attempted to resume ASA for medical management but held given GIB   - Continue on lipitor for medical management   - Course complicated by questionable head and body shaking 9/8 however prolactin elevated and shakes head yes/no to some questions.   - Lethargy more likely second to respiratory acidosis and will hold on RPT EEG    - Monitor mentation closely     CARDIOVASCULAR  # HTN Urgency   # Septic vs vasoplegic shock   - Labile BPs ranging in between SBP 80s-200s and attempted labetalol, however noted with hypotension and bradycardia likely from BB toxicity vs shock state?    - Holding Labetalol and Catapres   - Continue on Cardura for now however if BP soft preventing fluid removal then hold Cardura     # Hx of HFpEF with likely ADHF with volume overload.   - ECHO (7/2023) with EF 59 with severe LVH and diastolic dysfunction   - STEPHANIE (8/18) with normal LVRVSF however noted with increased LA pressures and persistent LA septal bowing into RA.   - ECHO (8/11) with EF 60 with mild LVH, normal LVRVSF, and mild ddfxn.  - Remove volume with HD sessions and intermittent bumex pushes as BP allows       HEENT   # Left ear OE   - ENT evaluation (9/7) with no mastoid tenderness, however found with positive swelling and excoriation to left auricle and tenderness to manipulation of auricle. Debrided crusting of auricle and EAC evaluated with edema and unable to visualize TM. EAC debrided and found with watery exudate.   - Continue on CiproHC (9/5 - )   - Continue on Bradford (9/1 - ) as below   - ENT following and ear wick change QD    # Oral Lesions with questionable Zoster vs Trauma?   - Patient with tongue pain and seen with anterior ulcerations consolidating and crusting and posterior ulceration.  - Case discussed with pathology resident and culture/ cytology sent.   - HSV negative and Path (9/6) with normal appearing epithelial cells singly and in clusters devoid of viral cytopathic effect.   - Continue on magic mouth wash for pain    RESPIRATORY  # AHHRF second to volume overload   - Hx of CKD and HFpEF presented with SOB and hypercarbia second to volume overload requiring intubation and HD initiation   - Vent weaning attempted however limited requiring tracheostomy (9/1) with IP   - Course complicated by PIPs elevated (50s) and respiratory acidosis second to secretions vs volume ?    - Vent adjusted 20/300/5/30 without improvement.   - POCUS (9/8) with BL pleural effusions (large on right and small on left)   - CT CHEST (9/8) with TBM, BL pleural effusions and continued consolidations   - Continue on vent and given TBM increased PEEP (9/9) to 20/300/8/40 with overall improvement   - Continue on duonebs and HTS for airway clearance   - Continue volume removal with diuresis and aggressive UF sessions.   - Discussing possible thora but appears improved and will monitor.  ]  - Bedside US showing decrease in pleural effusion - hold off thoracentesis 9/10     GI  # UGIB   - CGE x 1 episode on 8/24 and melena x 2 episodes on 8/31 likely residual blood.   - EGD (8/31) found with esophagitis and erosive gastropathy  - Continue on PPI BID through late October and then PPI QD     # Dysphagia   - NGT-TF continued   - Pending PEG however needs improvement in infectious status prior to placement.     RENAL  # Progressive CKD   - Presented volume overload and progressive RUSS on CKD (baseline CRE in 2s and mode into the 3s on admission) likely obstruction vs low flow state with shock vs AIN from drug reaction issue?  - POCUS with no signs of hydronephrosis   - Pressor support started with improvement in renal function  - Attempted steroid course in ICU without improvement in renal function   - Case discussed at length with renal and initiated on HD in ICU and now continued on HD TTHS, however remains volume overloaded.   - Patient oliguric however responds to Bumex pushes   - Continue on aggressive UF sessions with HD TTHS and bumex pushes as BP allows   - Monitor renal function and UOP     # Hyperphosphatemia   - PTH normal and elevated phos likely from renal failure  - Phos binder with Renvela started with improvement.     INFECTIOUS DISEASE  # ESBL ECOLI and MSSA VAP c/b MSSA bacteremia   - RVP/ COVID (8/11) negative   - SCx (8/11) with MSSA s/p cefepime (8/12-8/13) and then ancef (8/14) however noted with drug reaction and then changed to vanco and aztreonam (8/12-8/13) in ICU .   - Course complicated by recurrent fevers and return of MSSA with bacteremia.   - SCx (9/1) with MSSA and ESBL ECOLI   - BAL (9/1) with MSSA   - BCx (9/1) with MSSA and cleared on (9/4)   - ECHO (9/6) unable to rule out endocarditis   - Continue on Bradford (9/4 - ) and Vanco BY DOSE POST HD (9/4, 9/7, 9/9 ...) with duration TBD at this time.   - Given inability to rule out endocarditis will discuss possible 4 wks?   - Course complicated by fever (9/7) and UA with leuks but no bacteria, however compared to prior improved, RVP/COVID and SCx negative, and RPT CXR similar to prior   - Pending BCx, SCx, UCx, and LE DOPPLERS  - Febrile overnight 102 recultured and sent off     # Left ear OE   - ENT evaluation (9/7) with no mastoid tenderness, however found with positive swelling and excoriation to left auricle and tenderness to manipulation of auricle. Debrided crusting of auricle and EAC evaluated with edema and unable to visualize TM. EAC debrided and found with watery exudate.   - Continue on CiproHC (9/5 - )   - Continue on Bradford (9/1 - ) as below   - Given fevers pending CT TEMPORAL BONE     # MRSA PCRs   - MRSA PCR (8/11 and 8/14) negative   - Next MRSA PCR ordered for 9/10     HEME  # Anemia second to GIB vs renal disease   - Hold chemical DVT PPX given bleeding/ waxing and waning HH   - s/p multiple units of PRBCs (8/15, 8/21, 8/28, 8/31 and 9/8)   - Anemia panel with AOCD but with low %sat and ferrous sulfate added to optimize   - Monitor HH and discuss with renal for EPO sometime next week.     ONC   # Hx of Breast CA   - Patient dx in 2018 and s/p BL mastectomy (radical on right) and chemo and RT.   - Supportive care.     ENDOCRINE  # IDDM2   - Continue on NPH 3U and ISS   - Monitor FS and adjust as needed     LINES/ TUBES  - R ARTHUR TAYLOR (8/19 - )     SKIN  # Drug Eruption   - Attempted cefepime (8/12) vs ancef (8/13-8/14) and then noted with drug rxn.   - Hold further cephalosporins at this time   - Continue on steroids with prednisone 40 (8/18 - 9/2) then prednisone 30 (9/3-9/7), then prednisone 20 (9/8 - 9/10), then prednisone 10mg (9/11-9/13) then turn off.     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 While in MICU patient noted with worsening renal failure requiring HD initiation, CGE/ Melena s/p EGD 8/31 found with esophagitis and erosive gastropathy, new right cerebellar infarct and fevers second to ESBL COLI and MSSA VAP, MSSA bacteremia, left ear otitis externa and drug rxn to cephalosporins Course further complicated by prolonged vent time s/p tracheostomy 9/1 and transferred to RCU 9/3 completed by recurrent fevers with high PIP, respiratory acidosis and concern for volume overloaded.     NEUROLOGY  # Metabolic Encephalopath vs NEW cerebella infarct   - Baseline MS AOX3 with short term memory changes per family however noted with concern for poor mentation in ICU  - MRI (8/17) with NEW right cerebellar infarct and old left PCA/occipital infarcts  - STEPHANIE (8/17) with AV showing two linear mobile echogenic elements attached to valve leaflets consistent with Lambel's excrescence, but no TRINITY thrombus, and lambel's excrescence with uncertain clinical implication.   - EEG (8/11-8/15) with no seizures   - ILR interrogation (9/7) with SR and PACs falsely recorded as AF.   - Attempted to resume ASA for medical management but held given GIB   - Continue on lipitor for medical management   - Course complicated by questionable head and body shaking 9/8 however prolactin elevated and shakes head yes/no to some questions.   - Lethargy more likely second to respiratory acidosis and will hold on RPT EEG    - Monitor mentation closely Awakens to verbal stimuli     CARDIOVASCULAR  # HTN Urgency   # Septic vs vasoplegic shock   - Labile BPs ranging in between SBP 80s-200s and attempted labetalol, however noted with hypotension and bradycardia likely from BB toxicity vs shock state?    - Holding Labetalol and Catapres   - Continue on Cardura for now however if BP soft preventing fluid removal then hold Cardura     # Hx of HFpEF with likely ADHF with volume overload.   - ECHO (7/2023) with EF 59 with severe LVH and diastolic dysfunction   - STEPHANIE (8/18) with normal LVRVSF however noted with increased LA pressures and persistent LA septal bowing into RA.   - ECHO (8/11) with EF 60 with mild LVH, normal LVRVSF, and mild ddfxn.  - Remove volume with HD sessions and intermittent bumex pushes as BP allows       HEENT   # Left ear OE   - ENT evaluation (9/7) with no mastoid tenderness, however found with positive swelling and excoriation to left auricle and tenderness to manipulation of auricle. Debrided crusting of auricle and EAC evaluated with edema and unable to visualize TM. EAC debrided and found with watery exudate.   - Continue on CiproHC (9/5 - )   - Continue on Bradford (9/1 - ) as below   - ENT following and ear wick change QD   - Wick out but needs ? debridement wound care called/fu ent     # Oral Lesions with questionable Zoster vs Trauma?   - Patient with tongue pain and seen with anterior ulcerations consolidating and crusting and posterior ulceration.  - Case discussed with pathology resident and culture/ cytology sent.   - HSV negative and Path (9/6) with normal appearing epithelial cells singly and in clusters devoid of viral cytopathic effect.   - Continue on magic mouth wash for pain    RESPIRATORY  # AHHRF second to volume overload   - Hx of CKD and HFpEF presented with SOB and hypercarbia second to volume overload requiring intubation and HD initiation   - Vent weaning attempted however limited requiring tracheostomy (9/1) with IP   - Course complicated by PIPs elevated (50s) and respiratory acidosis second to secretions vs volume ?    - Vent adjusted 20/300/5/30 without improvement.   - POCUS (9/8) with BL pleural effusions (large on right and small on left)   - CT CHEST (9/8) with TBM, BL pleural effusions and continued consolidations   - Continue on vent and given TBM increased PEEP (9/9) to 20/300/8/40 with overall improvement   - Continue on duonebs and HTS for airway clearance   - Continue volume removal with diuresis and aggressive UF sessions.   - Discussing possible thora but appears improved and will monitor.  ]  - Bedside US showing decrease in pleural effusion - hold off thoracentesis 9/10     GI  # UGIB   - CGE x 1 episode on 8/24 and melena x 2 episodes on 8/31 likely residual blood.   - EGD (8/31) found with esophagitis and erosive gastropathy  - Continue on PPI BID through late October and then PPI QD   - No melena     # Dysphagia   - NGT-TF continued   - Pending PEG however needs improvement in infectious status prior to placement.     RENAL  # Progressive CKD   - Presented volume overload and progressive RUSS on CKD (baseline CRE in 2s and mode into the 3s on admission) likely obstruction vs low flow state with shock vs AIN from drug reaction issue?  - POCUS with no signs of hydronephrosis   - Pressor support started with improvement in renal function  - Attempted steroid course in ICU without improvement in renal function   - Case discussed at length with renal and initiated on HD in ICU and now continued on HD TTHS, however remains volume overloaded.   - Patient oliguric however responds to Bumex pushes   - Continue on aggressive UF sessions with HD TTHS and bumex pushes as BP allows   - Monitor renal function and UOP     # Hyperphosphatemia   - PTH normal and elevated phos likely from renal failure  - Phos binder with Renvela started with improvement.     INFECTIOUS DISEASE  # ESBL ECOLI and MSSA VAP c/b MSSA bacteremia   - RVP/ COVID (8/11) negative   - SCx (8/11) with MSSA s/p cefepime (8/12-8/13) and then ancef (8/14) however noted with drug reaction and then changed to vanco and aztreonam (8/12-8/13) in ICU .   - Course complicated by recurrent fevers and return of MSSA with bacteremia.   - SCx (9/1) with MSSA and ESBL ECOLI   - BAL (9/1) with MSSA   - BCx (9/1) with MSSA and cleared on (9/4)   - ECHO (9/6) unable to rule out endocarditis   - Continue on Bradford (9/4 - ) and Vanco BY DOSE POST HD (9/4, 9/7, 9/9 ...) with duration TBD at this time.   - Given inability to rule out endocarditis will discuss possible 4 wks?   - Course complicated by fever (9/7) and UA with leuks but no bacteria, however compared to prior improved, RVP/COVID and SCx negative, and RPT CXR similar to prior   - Pending BCx, SCx, UCx, and LE DOPPLERS  - Febrile overnight 102 recultured and sent off   -Pt recieving vanco post HD however today given vanco 750mg x 1 will check Vanco level at end of HD session and dose     # Left ear OE   - ENT evaluation (9/7) with no mastoid tenderness, however found with positive swelling and excoriation to left auricle and tenderness to manipulation of auricle. Debrided crusting of auricle and EAC evaluated with edema and unable to visualize TM. EAC debrided and found with watery exudate.   - Continue on CiproHC (9/5 - )   - Continue on Bradford (9/1 - ) as below   - Given fevers pending CT TEMPORAL BONE - CT head done Auditory  CT ordered to better eval temporal bone /ear     # MRSA PCRs   - MRSA PCR (8/11 and 8/14) negative   - Next MRSA + PCR ordered for 10/8     HEME  # Anemia second to GIB vs renal disease   - Hold chemical DVT PPX given bleeding/ waxing and waning HH   - s/p multiple units of PRBCs (8/15, 8/21, 8/28, 8/31 and 9/8)   - Anemia panel with AOCD but with low %sat and ferrous sulfate added to optimize   - Monitor HH and discuss with renal for EPO sometime next week.   - SPoke with GI for clearance to start Heparin gtt for + DVT     Vascular  # DVT  - Pt with + popliteal DVT on SCD Spoke with GI no absolute contraindication for starting Heparin - advise close monitoring for bleeding - Do stat CTA if bleeding occurs and call IR   - Pt specific heparin gtt started with goal PTT 60-85      ONC   # Hx of Breast CA   - Patient dx in 2018 and s/p BL mastectomy (radical on right) and chemo and RT.   - Supportive care.     ENDOCRINE  # IDDM2   - Continue on NPH 3U and ISS   - Monitor FS and adjust as needed     LINES/ TUBES  - R ARTHUR TAYLOR (8/19 - )     SKIN  # Drug Eruption   - Attempted cefepime (8/12) vs ancef (8/13-8/14) and then noted with drug rxn.   - Hold further cephalosporins at this time   - Continue on steroids with prednisone 40 (8/18 - 9/2) then prednisone 30 (9/3-9/7), then prednisone 20 (9/8 - 9/10), then prednisone 10mg (9/11-9/13) then turn off.     ETHICS/ GOC    - FULL CODE     DISPO - TBD

## 2023-09-11 NOTE — PROGRESS NOTE ADULT - SUBJECTIVE AND OBJECTIVE BOX
ENT ISSUE/POD: left otitis externa    HPI: Patient seen and examined at bedside. Patient's family and Rn were also presents during the exam. Patient is on full vent support, not able to answer any questions.        PAST MEDICAL & SURGICAL HISTORY:  Breast CA      Diabetes      Stroke      Cardiac arrest      HTN (hypertension)      H/O mastectomy, bilateral        Allergies    isoniazid (Rash)  nafcillin (Unknown)  hydrALAZINE (Rash)  vitamin E (Short breath; Urticaria; Hives)  doxycycline (Rash)  cefepime (Rash)  NIFEdipine (Urticaria; Hives)    Intolerances      MEDICATIONS  (STANDING):  albuterol/ipratropium for Nebulization 3 milliLiter(s) Nebulizer every 6 hours  atorvastatin 40 milliGRAM(s) Oral at bedtime  chlorhexidine 0.12% Liquid 15 milliLiter(s) Oral Mucosa every 12 hours  chlorhexidine 2% Cloths 1 Application(s) Topical daily  ciprofloxacin/hydrocortisone Suspension Otic 4 Drop(s) Left Ear two times a day  dextrose 5%. 1000 milliLiter(s) (100 mL/Hr) IV Continuous <Continuous>  dextrose 5%. 1000 milliLiter(s) (50 mL/Hr) IV Continuous <Continuous>  dextrose 50% Injectable 25 Gram(s) IV Push once  dextrose 50% Injectable 25 Gram(s) IV Push once  dextrose 50% Injectable 12.5 Gram(s) IV Push once  doxazosin 6 milliGRAM(s) Oral <User Schedule>  ferrous    sulfate Liquid 300 milliGRAM(s) Enteral Tube daily  FIRST- Mouthwash  BLM 10 milliLiter(s) Swish and Spit four times a day  glucagon  Injectable 1 milliGRAM(s) IntraMuscular once  heparin   Injectable 5000 Unit(s) SubCutaneous every 8 hours  insulin lispro (ADMELOG) corrective regimen sliding scale   SubCutaneous every 6 hours  insulin NPH human recombinant 3 Unit(s) SubCutaneous every 6 hours  meropenem  IVPB 500 milliGRAM(s) IV Intermittent every 12 hours  mupirocin 2% Ointment 1 Application(s) Both Nostrils two times a day  pantoprazole  Injectable 40 milliGRAM(s) IV Push two times a day  predniSONE   Tablet 10 milliGRAM(s) Oral daily  predniSONE   Tablet   Oral   sevelamer carbonate Powder 1600 milliGRAM(s) Oral three times a day  sodium chloride 3%  Inhalation 4 milliLiter(s) Inhalation every 6 hours    MEDICATIONS  (PRN):  acetaminophen   Oral Liquid .. 650 milliGRAM(s) Oral every 6 hours PRN Temp greater or equal to 38C (100.4F), Mild Pain (1 - 3)  dextrose Oral Gel 15 Gram(s) Oral once PRN Blood Glucose LESS THAN 70 milliGRAM(s)/deciliter      Social History: see consult note    Family history: see consult note    ROS:   ENT: all negative except as noted in HPI   Pulm: denies SOB, cough, hemoptysis  Neuro: denies numbness/tingling, loss of sensation  Endo: denies heat/cold intolerance, excessive sweating      Vital Signs Last 24 Hrs  T(C): 38.3 (11 Sep 2023 06:54), Max: 38.3 (10 Sep 2023 21:00)  T(F): 101 (11 Sep 2023 06:54), Max: 101 (10 Sep 2023 21:00)  HR: 96 (11 Sep 2023 09:57) (88 - 113)  BP: 140/64 (11 Sep 2023 04:00) (116/53 - 140/64)  BP(mean): --  RR: 22 (11 Sep 2023 04:00) (22 - 26)  SpO2: 97% (11 Sep 2023 09:57) (96% - 100%)    Parameters below as of 11 Sep 2023 04:00  Patient On (Oxygen Delivery Method): ventilator    O2 Concentration (%): 35                          7.1    10.99 )-----------( 218      ( 11 Sep 2023 10:00 )             22.4    09-11    127<L>  |  92<L>  |  67<H>  ----------------------------<  147<H>  4.4   |  25  |  2.24<H>    Ca    7.9<L>      11 Sep 2023 10:00  Phos  3.5     09-11  Mg     2.30     09-11     PT/INR - ( 09 Sep 2023 13:30 )   PT: 10.7 sec;   INR: 0.95 ratio         PTT - ( 09 Sep 2023 13:30 )  PTT:26.6 sec    PHYSICAL EXAM:  Gen: NAD  Skin: No rashes, bruises, or lesions  Head: Normocephalic, Atraumatic  Face: no edema, erythema, or fluctuance. Parotid glands soft without mass  Eyes: no scleral injection  Ears: Left - external conchal bowl with necrotic tissue and external ear canal filled with whitish debris, removed via alligator. TM intact without effusion or erythema. + clear fluid drainage noted. No mastoid tenderness, erythema, or ear bulging  Nose: Nares bilaterally patent, no discharge  Mouth: No Stridor / Drooling / Trismus.  Mucosa moist, tongue/uvula midline, oropharynx clear  Neck: +tracheostomy tube in place. Flat, supple, no lymphadenopathy, trachea midline, no masses  Lymphatic: No lymphadenopathy  Resp: on full ventilator support breathing easily, no stridor  Neuro: facial nerve intact, no facial droop         ENT ISSUE/POD: left otitis externa    HPI: Patient seen and examined at bedside. Patient's family and Rn were also presents during the exam. Patient is on full vent support, not able to answer any questions.        PAST MEDICAL & SURGICAL HISTORY:  Breast CA      Diabetes      Stroke      Cardiac arrest      HTN (hypertension)      H/O mastectomy, bilateral        Allergies    isoniazid (Rash)  nafcillin (Unknown)  hydrALAZINE (Rash)  vitamin E (Short breath; Urticaria; Hives)  doxycycline (Rash)  cefepime (Rash)  NIFEdipine (Urticaria; Hives)    Intolerances      MEDICATIONS  (STANDING):  albuterol/ipratropium for Nebulization 3 milliLiter(s) Nebulizer every 6 hours  atorvastatin 40 milliGRAM(s) Oral at bedtime  chlorhexidine 0.12% Liquid 15 milliLiter(s) Oral Mucosa every 12 hours  chlorhexidine 2% Cloths 1 Application(s) Topical daily  ciprofloxacin/hydrocortisone Suspension Otic 4 Drop(s) Left Ear two times a day  dextrose 5%. 1000 milliLiter(s) (100 mL/Hr) IV Continuous <Continuous>  dextrose 5%. 1000 milliLiter(s) (50 mL/Hr) IV Continuous <Continuous>  dextrose 50% Injectable 25 Gram(s) IV Push once  dextrose 50% Injectable 25 Gram(s) IV Push once  dextrose 50% Injectable 12.5 Gram(s) IV Push once  doxazosin 6 milliGRAM(s) Oral <User Schedule>  ferrous    sulfate Liquid 300 milliGRAM(s) Enteral Tube daily  FIRST- Mouthwash  BLM 10 milliLiter(s) Swish and Spit four times a day  glucagon  Injectable 1 milliGRAM(s) IntraMuscular once  heparin   Injectable 5000 Unit(s) SubCutaneous every 8 hours  insulin lispro (ADMELOG) corrective regimen sliding scale   SubCutaneous every 6 hours  insulin NPH human recombinant 3 Unit(s) SubCutaneous every 6 hours  meropenem  IVPB 500 milliGRAM(s) IV Intermittent every 12 hours  mupirocin 2% Ointment 1 Application(s) Both Nostrils two times a day  pantoprazole  Injectable 40 milliGRAM(s) IV Push two times a day  predniSONE   Tablet 10 milliGRAM(s) Oral daily  predniSONE   Tablet   Oral   sevelamer carbonate Powder 1600 milliGRAM(s) Oral three times a day  sodium chloride 3%  Inhalation 4 milliLiter(s) Inhalation every 6 hours    MEDICATIONS  (PRN):  acetaminophen   Oral Liquid .. 650 milliGRAM(s) Oral every 6 hours PRN Temp greater or equal to 38C (100.4F), Mild Pain (1 - 3)  dextrose Oral Gel 15 Gram(s) Oral once PRN Blood Glucose LESS THAN 70 milliGRAM(s)/deciliter      Social History: see consult note    Family history: see consult note    ROS: unable to answer, patient on full ventilator support      Vital Signs Last 24 Hrs  T(C): 38.3 (11 Sep 2023 06:54), Max: 38.3 (10 Sep 2023 21:00)  T(F): 101 (11 Sep 2023 06:54), Max: 101 (10 Sep 2023 21:00)  HR: 96 (11 Sep 2023 09:57) (88 - 113)  BP: 140/64 (11 Sep 2023 04:00) (116/53 - 140/64)  BP(mean): --  RR: 22 (11 Sep 2023 04:00) (22 - 26)  SpO2: 97% (11 Sep 2023 09:57) (96% - 100%)    Parameters below as of 11 Sep 2023 04:00  Patient On (Oxygen Delivery Method): ventilator    O2 Concentration (%): 35                          7.1    10.99 )-----------( 218      ( 11 Sep 2023 10:00 )             22.4    09-11    127<L>  |  92<L>  |  67<H>  ----------------------------<  147<H>  4.4   |  25  |  2.24<H>    Ca    7.9<L>      11 Sep 2023 10:00  Phos  3.5     09-11  Mg     2.30     09-11     PT/INR - ( 09 Sep 2023 13:30 )   PT: 10.7 sec;   INR: 0.95 ratio         PTT - ( 09 Sep 2023 13:30 )  PTT:26.6 sec    PHYSICAL EXAM:  Gen: NAD  Skin: No rashes, bruises, or lesions  Head: Normocephalic, Atraumatic  Face: no edema, erythema, or fluctuance. Parotid glands soft without mass  Eyes: no scleral injection  Ears: Left - external conchal bowl with necrotic tissue and external ear canal filled with whitish debris, removed via alligator. TM intact without effusion or erythema. + clear fluid drainage noted. No mastoid tenderness, erythema, or ear bulging  Nose: Nares bilaterally patent, no discharge  Mouth: No Stridor / Drooling / Trismus.  Mucosa moist, tongue/uvula midline, oropharynx clear  Neck: +tracheostomy tube in place. Flat, supple, no lymphadenopathy, trachea midline, no masses  Lymphatic: No lymphadenopathy  Resp: on full ventilator support breathing easily, no stridor  Neuro: facial nerve intact, no facial droop

## 2023-09-11 NOTE — CHART NOTE - NSCHARTNOTEFT_GEN_A_CORE
Per chart review, patient was ordered for patient specific hep gtt at 16:49. At this time, Hep gtt still not started. Case discussed w/ primary RN. Hep gtt to be started STAT now for DVT. Will fu PTT. Will continue to monitor. Per chart review, patient was ordered for patient specific hep gtt (goal 60-85) at 10cc/hr at 16:49. At this time, Hep gtt still not started. Case discussed w/ primary RN. Hep gtt to be started STAT now for DVT. Will fu PTT. Will continue to monitor.    Addendum 9/12 6:00:  First ptt result of 102.4 Hep gtt rate decreased to 9cc/hr. FU next PTT in 6 hours. No signs/symptoms of bleeding.

## 2023-09-11 NOTE — PROGRESS NOTE ADULT - SUBJECTIVE AND OBJECTIVE BOX
Subjective: Patient seen and examined. No new events except as noted.   Acute left deep vein thrombosis of the left popliteal vein.  remains febrile    REVIEW OF SYSTEMS:    Unable to obtain     MEDICATIONS:  MEDICATIONS  (STANDING):  albuterol/ipratropium for Nebulization 3 milliLiter(s) Nebulizer every 6 hours  atorvastatin 40 milliGRAM(s) Oral at bedtime  chlorhexidine 0.12% Liquid 15 milliLiter(s) Oral Mucosa every 12 hours  chlorhexidine 2% Cloths 1 Application(s) Topical daily  ciprofloxacin/hydrocortisone Suspension Otic 4 Drop(s) Left Ear two times a day  dextrose 5%. 1000 milliLiter(s) (50 mL/Hr) IV Continuous <Continuous>  dextrose 5%. 1000 milliLiter(s) (100 mL/Hr) IV Continuous <Continuous>  dextrose 50% Injectable 12.5 Gram(s) IV Push once  dextrose 50% Injectable 25 Gram(s) IV Push once  dextrose 50% Injectable 25 Gram(s) IV Push once  doxazosin 6 milliGRAM(s) Oral <User Schedule>  ferrous    sulfate Liquid 300 milliGRAM(s) Enteral Tube daily  FIRST- Mouthwash  BLM 10 milliLiter(s) Swish and Spit four times a day  glucagon  Injectable 1 milliGRAM(s) IntraMuscular once  heparin   Injectable 5000 Unit(s) SubCutaneous every 8 hours  insulin lispro (ADMELOG) corrective regimen sliding scale   SubCutaneous every 6 hours  insulin NPH human recombinant 3 Unit(s) SubCutaneous every 6 hours  meropenem  IVPB 500 milliGRAM(s) IV Intermittent every 12 hours  mupirocin 2% Ointment 1 Application(s) Both Nostrils two times a day  pantoprazole  Injectable 40 milliGRAM(s) IV Push two times a day  predniSONE   Tablet 10 milliGRAM(s) Oral daily  predniSONE   Tablet   Oral   sevelamer carbonate Powder 1600 milliGRAM(s) Oral three times a day  sodium chloride 3%  Inhalation 4 milliLiter(s) Inhalation every 6 hours      PHYSICAL EXAM:  T(C): 38.3 (09-11-23 @ 06:54), Max: 38.3 (09-10-23 @ 21:00)  HR: 106 (09-11-23 @ 07:29) (88 - 113)  BP: 140/64 (09-11-23 @ 04:00) (116/53 - 140/64)  RR: 22 (09-11-23 @ 04:00) (22 - 26)  SpO2: 96% (09-11-23 @ 07:29) (96% - 100%)  Wt(kg): --  I&O's Summary    10 Sep 2023 07:01  -  11 Sep 2023 07:00  --------------------------------------------------------  IN: 385 mL / OUT: 0 mL / NET: 385 mL            Appearance: NAD, +trach  HEENT: dry oral mucosa  Lymphatic: No lymphadenopathy  Cardiovascular: Normal S1 S2, No JVD, No murmurs, No edema  Respiratory: Decreased BS, + trach to vent	  Neuro: opens eyes  Gastrointestinal: Soft, Non-tender, + BS, + OGT	  Skin: No rashes, No ecchymoses, No cyanosis	  Extremities: No strength/ROM 2/2 sedation, + BL LE edema  Vascular: Peripheral pulses palpable 2+ bilaterally      LABS:    CARDIAC MARKERS:                                8.0    11.56 )-----------( 221      ( 10 Sep 2023 08:15 )             25.0     09-10    131<L>  |  96<L>  |  54<H>  ----------------------------<  185<H>  3.9   |  24  |  1.97<H>    Ca    7.7<L>      10 Sep 2023 06:52  Phos  3.1     09-10  Mg     2.10     09-10          TELEMETRY: 	    ECG:  	  RADIOLOGY: < from: US Duplex Venous Lower Ext Complete, Bilateral (09.10.23 @ 17:56) >  ACC: [id number] EXAM:  US DPLX LWR EXT VEINS COMPL BI   ORDERED BY: JORGE RANDALL     PROCEDURE DATE:  09/10/2023          INTERPRETATION:  CLINICAL INFORMATION: Repeat DVT study.    COMPARISON: None available.    TECHNIQUE: Duplex sonography of the BILATERAL LOWER extremity veins with   color and spectral Doppler, with and without compression.    FINDINGS:    RIGHT:  Normal compressibility of the RIGHT common femoral, femoral and popliteal   veins.  Doppler examination shows normal spontaneous and phasic flow.  No RIGHT calf vein thrombosis is detected.    LEFT:  Normal compressibility of the LEFT common femoral and femoral veins.  Left popliteal vein shown to be not compressible. Absence of flow within   the left popliteal vein.    IMPRESSION:  Acute left deep vein thrombosis of the left popliteal vein.    --- End of Report ---        < end of copied text >    DIAGNOSTIC TESTING:  [ ] Echocardiogram:  [ ]  Catheterization:  [ ] Stress Test:    OTHER:

## 2023-09-11 NOTE — PROGRESS NOTE ADULT - SUBJECTIVE AND OBJECTIVE BOX
Follow Up:  MSSA bacteremia    Interval History: pt continues to be febrile and L ear with worsening edema and discoloration    ROS:    Unobtainable because: trached        Allergies  isoniazid (Rash)  nafcillin (Unknown)  hydrALAZINE (Rash)  vitamin E (Short breath; Urticaria; Hives)  doxycycline (Rash)  cefepime (Rash)  NIFEdipine (Urticaria; Hives)        ANTIMICROBIALS:  meropenem  IVPB 500 every 12 hours      OTHER MEDS:  acetaminophen   Oral Liquid .. 650 milliGRAM(s) Oral every 6 hours PRN  albuterol/ipratropium for Nebulization 3 milliLiter(s) Nebulizer every 6 hours  atorvastatin 40 milliGRAM(s) Oral at bedtime  chlorhexidine 0.12% Liquid 15 milliLiter(s) Oral Mucosa every 12 hours  chlorhexidine 2% Cloths 1 Application(s) Topical daily  ciprofloxacin/hydrocortisone Suspension Otic 4 Drop(s) Left Ear two times a day  dextrose 5%. 1000 milliLiter(s) IV Continuous <Continuous>  dextrose 5%. 1000 milliLiter(s) IV Continuous <Continuous>  dextrose 50% Injectable 12.5 Gram(s) IV Push once  dextrose 50% Injectable 25 Gram(s) IV Push once  dextrose 50% Injectable 25 Gram(s) IV Push once  dextrose Oral Gel 15 Gram(s) Oral once PRN  doxazosin 6 milliGRAM(s) Oral <User Schedule>  ferrous    sulfate Liquid 300 milliGRAM(s) Enteral Tube daily  FIRST- Mouthwash  BLM 10 milliLiter(s) Swish and Spit four times a day  glucagon  Injectable 1 milliGRAM(s) IntraMuscular once  heparin   Injectable 5000 Unit(s) SubCutaneous every 8 hours  heparin  Infusion 1000 Unit(s)/Hr IV Continuous <Continuous>  insulin lispro (ADMELOG) corrective regimen sliding scale   SubCutaneous every 6 hours  insulin NPH human recombinant 3 Unit(s) SubCutaneous every 6 hours  mupirocin 2% Ointment 1 Application(s) Both Nostrils two times a day  pantoprazole  Injectable 40 milliGRAM(s) IV Push two times a day  predniSONE   Tablet   Oral   predniSONE   Tablet 10 milliGRAM(s) Oral daily  sevelamer carbonate Powder 1600 milliGRAM(s) Oral three times a day  sodium chloride 3%  Inhalation 4 milliLiter(s) Inhalation every 6 hours      Vital Signs Last 24 Hrs  T(C): 37.5 (11 Sep 2023 13:36), Max: 38.3 (10 Sep 2023 21:00)  T(F): 99.5 (11 Sep 2023 13:36), Max: 101 (10 Sep 2023 21:00)  HR: 98 (11 Sep 2023 16:30) (88 - 108)  BP: 119/56 (11 Sep 2023 13:36) (119/56 - 140/64)  BP(mean): --  RR: 22 (11 Sep 2023 13:36) (22 - 25)  SpO2: 94% (11 Sep 2023 16:30) (93% - 100%)    Parameters below as of 11 Sep 2023 16:30  Patient On (Oxygen Delivery Method): ventilator        Physical Exam:  General:    trached shaking her head  ENT: L ear edema, black discoloration and bullae formation  Respiratory:   trach on vent  abd:   soft, not tender  :     no CVAT, no suprapubic tenderness, no willard  Musculoskeletal : no joint swelling  Skin:    no rash now  vascular: SHAKIRA odom                                     7.1    10. )-----------( 218      ( 11 Sep 2023 10:00 )             22.4       09-11    127<L>  |  92<L>  |  67<H>  ----------------------------<  147<H>  4.4   |  25  |  2.24<H>    Ca    7.9<L>      11 Sep 2023 10:00  Phos  3.5     09-11  Mg     2.30     09-11        Urinalysis Basic - ( 11 Sep 2023 10:00 )    Color: Yellow / Appearance: Clear / S.020 / pH: x  Gluc: 147 mg/dL / Ketone: Trace mg/dL  / Bili: Negative / Urobili: 0.2 mg/dL   Blood: x / Protein: 300 mg/dL / Nitrite: Negative   Leuk Esterase: Small / RBC: 10 /HPF / WBC 12 /HPF   Sq Epi: x / Non Sq Epi: 1 /HPF / Bacteria: Negative /HPF        MICROBIOLOGY:  v  .Sputum Sputum  09-10-23 --  --    Rare polymorphonuclear leukocytes per low power field  No Squamous epithelial cells per low power field  Rare Yeast like cells seen per oil power field  Rare Gram Positive Cocci in Clusters seen per oil power field      .Blood Blood-Peripheral  09-10-23   No growth at 24 hours  --  --      .Sputum Sputum  23   Normal Respiratory Cate present  --    Numerous polymorphonuclear leukocytes per low power field  Numerous Squamous epithelial cells per low power field  Few Yeast like cells per oil power field  Results consistent with oropharyngeal contamination      .Blood Blood-Peripheral  23   No growth at 72 Hours  --  --      Ear Ear  23   No growth at 5 days  --  --      .Blood Blood-Peripheral  23   No growth at 5 days  --  --      .Bronchial Bronchial Lavage  23   Numerous Staphylococcus aureus Multiple Morphological Strains  Normal Respiratory Cate present  --  Staphylococcus aureus  Staphylococcus aureus      .Blood Blood-Peripheral  23   Growth in aerobic and anaerobic bottles: Staphylococcus aureus  Direct identification is available within approximately 3-5  hours either by Blood Panel Multiplexed PCR or Direct  MALDI-TOF. Details: [email or web address]  Growth in anaerobic bottle: blood culture bottle turned positive after  the final report was released.  --  Blood Culture PCR  Staphylococcus aureus      ET Tube ET Tube  23   Moderate Staphylococcus aureus  Moderate Escherichia coli ESBL  Normal Respiratory Cate present  --  Staphylococcus aureus  Escherichia coli ESBL      .Blood Blood-Peripheral  23   No growth at 5 days  --  --      .Blood Blood-Peripheral  23   No growth at 5 days  --  --      .Sputum Sputum  23   Normal Respiratory Cate present  --    Few polymorphonuclear leukocytes per low power field  Rare Squamous epithelial cells per low power field  Few Gram Positive Cocci in Clusters per oil power field          Rapid RVP Result: NotDetec ( @ 11:38)        RADIOLOGY:  Images independently visualized and reviewed personally, findings as below  < from: US Duplex Venous Lower Ext Complete, Bilateral (09.10.23 @ 17:56) >  IMPRESSION:  Acute left deep vein thrombosis of the left popliteal vein.      < end of copied text >  < from: CT Chest No Cont (23 @ 21:27) >  Endotracheal tube tip above the ines. Below the endotracheal tube tip,   the lower trachea is partially collapsed with a crescentic configuration   suggestive of tracheomalacia. More inferiorly there is severe stenosis of   the trachea measuring 5.6 x 4.1 mm.    The left upper lobe bronchus is obliterated, new from prior exam. There   is paramediastinal and perihilar consolidation of left upper lobe,   increased from prior exam. Tree-in-bud opacities throughout the left lung   are increased.    Cavitary consolidation of right upper lobe is not significantly changed.   Tree-in-bud opacities within the right lung are increased.    Large right pleural effusion and small left pleural effusion are   increased.    < end of copied text >

## 2023-09-11 NOTE — PROGRESS NOTE ADULT - ASSESSMENT
72 y/o Female w/ DM, CVA, HTN admitted for respiratory  failure s/p tracheostomy on 9/1/2023, bacteremia. ENT consulted for L OE 9/5. Requiring serial debridements, wick removed today, ear debrided, removed large piece of debri with alligator, able to see TM and noted watery thin discharge on the canal. External conchal bowl with necrotic tissue, currently marked with marking pen. Culture from 9/6/2023 with no growth

## 2023-09-11 NOTE — PROGRESS NOTE ADULT - NS ATTEND AMEND GEN_ALL_CORE FT
Pt is a 73F with PMHx IDDMII, CKD, hx CVA, CHFpEF, latent TB (s/p tx,) hx breast cancer (2018, s/p mastectomy and adjuvant CT/RT), and hx CVA s/p trach/PEG (now decannulated) initially presenting to [facility] on 8/11/23 with acute hypoxemic and hypercapnic respiratory failure s/p ETT intubation/MV and ARF with pulmonary edema c/b HTN emergency requiring nicardipine drip transferred to MICU for further management.     Pt initially p/w worsening RUE shaking and dysconjugate gaze with MRI 8/17 (+) new right cerebellar, midbrain, and multiple tiny embolic infarcts as well as known left PCA CVA. EEG (-). 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 ILR in place from prior CVA evaluation, last interrogated 9/7 without AF. At baseline, pt reportedly wheelchair bound with RUE tremors and some ADL assistance. Pt was unable to tolerate ASA 2/2 GIB.     Pt with acute hypoxemic and hypercapnic respiratory failure s/p ETT intubation/MV possibly 2/2 flash pulmonary edema in the setting of HTN emergency +/- aspiration event. Pt with failure of ventilatory weaning now s/p tracheostomy placement (IP 9/1). Of note, pt also noted to have evidence of midbrain CVA with possible concern for central effect on respiratory drive, will monitor carefully and continue weaning trials as tolerated. Airway clearance therapy in place. Wean O2 supplementation for goal O2 saturation 90-95%. Oral hygiene and aspiration precautions in place.      Pt p/w ARF on CKD requiring initiation of HD 2/2 pulmonary edema and metabolic acidosis with metabolic encephalopathy. No urgent indication for HD today, further HD as per nephrology team. On prednisone taper for possible AIN. Strict I/Os, pt makes small amount of urine. Can attempt diuretic challenge.     Hospital course further c/b recurrent infections, most recently with MSSA bacteremia (9/1, cleared 9/4 and 9/8) with (+) MSSA and ESBL EColi in BAL as well. Pt with possible cefepime vs. cefazolin drug-induced rash followed by transition to vancomycin. Was then found to have left otitis externa (9/5) now requiring serial bedside debridement with worsening necrotic tissue/erythema for which she was also initiated on meropenem. CTTB pending, family refusing contrast. Will proceed with noncontrast study for now. Cultures NTD, pt remains febrile intermittently. Will continue with meropenem and Ciprodex otic drops x10 day course for now as well as continuation of vancomycin by level x4 week course through 10/2 (unable to exclude endocarditis).      Pt also with oropharyngeal dysphagia requiring NGTs followed by UGIB s/p EGD (8/31) found to have esophagitis/erosive gastropathy now on PPI BID. Pt further found to have popliteal DVT. No c/i by GI for A/C, pt initiated on heparin drip with patient-specific pTTs. Will need to ultimately transition to Coumadin x3-6 months.  Dispo pending medical optimization. Pt full code. DVT ppx full A/C with heparin drip. Discussed with pt's family ( and daughter) at bedside daily.

## 2023-09-11 NOTE — PROGRESS NOTE ADULT - PROBLEM SELECTOR PLAN 1
- Please do not keep dry gauze on pinna, if pus pouring out from ear canal, please send for culture  - FSG control - FSG goal <150  - Continue with  broad spectrum antibiotics- now on meropenem , ciprodex x 10d  - ID recommendation appreciated  - Please get CTTB to better assess and ensure no bony destruction/ involvement  - Case discussed with Dr. Lopez  - Will discuss the case with Dr. Feng after CTTB are back

## 2023-09-11 NOTE — PROGRESS NOTE ADULT - SUBJECTIVE AND OBJECTIVE BOX
[facility] [phone]        Patient seen and examined trach to paula. She had HD Saturday 2.4L removed.          MEDICATIONS  (STANDING):  albuterol/ipratropium for Nebulization 3 milliLiter(s) Nebulizer every 6 hours  atorvastatin 40 milliGRAM(s) Oral at bedtime  chlorhexidine 0.12% Liquid 15 milliLiter(s) Oral Mucosa every 12 hours  chlorhexidine 2% Cloths 1 Application(s) Topical daily  ciprofloxacin/hydrocortisone Suspension Otic 4 Drop(s) Left Ear two times a day  dextrose 5%. 1000 milliLiter(s) (50 mL/Hr) IV Continuous <Continuous>  dextrose 5%. 1000 milliLiter(s) (100 mL/Hr) IV Continuous <Continuous>  dextrose 50% Injectable 12.5 Gram(s) IV Push once  dextrose 50% Injectable 25 Gram(s) IV Push once  dextrose 50% Injectable 25 Gram(s) IV Push once  doxazosin 6 milliGRAM(s) Oral <User Schedule>  ferrous    sulfate Liquid 300 milliGRAM(s) Enteral Tube daily  FIRST- Mouthwash  BLM 10 milliLiter(s) Swish and Spit four times a day  glucagon  Injectable 1 milliGRAM(s) IntraMuscular once  heparin   Injectable 5000 Unit(s) SubCutaneous every 8 hours  insulin lispro (ADMELOG) corrective regimen sliding scale   SubCutaneous every 6 hours  insulin NPH human recombinant 3 Unit(s) SubCutaneous every 6 hours  meropenem  IVPB 500 milliGRAM(s) IV Intermittent every 12 hours  mupirocin 2% Ointment 1 Application(s) Both Nostrils two times a day  pantoprazole  Injectable 40 milliGRAM(s) IV Push two times a day  predniSONE   Tablet 10 milliGRAM(s) Oral daily  predniSONE   Tablet   Oral   sevelamer carbonate Powder 1600 milliGRAM(s) Oral three times a day  sodium chloride 3%  Inhalation 4 milliLiter(s) Inhalation every 6 hours      VITAL:  T(C): , Max: 38.3 (09-10-23 @ 21:00)  T(F): , Max: 101 (09-10-23 @ 21:00)  HR: 93 (23 @ 11:07)  BP: 140/64 (23 @ 04:00)  BP(mean): --  RR: 22 (23 @ 04:00)  SpO2: 98% (23 @ 11:07)  Wt(kg): --    I and O's:    09-10 @ 07:01  -   @ 07:00  --------------------------------------------------------  IN: 385 mL / OUT: 0 mL / NET: 385 mL          PHYSICAL EXAM:    Constitutional: trach to vent  HEENT: + tracheostomy +NGT  Respiratory: Coarse bs   Cardiovascular: tachy s1s2  Gastrointestinal: BS+, soft, NT/ND  Extremities: no peripheral edema  Neurological: uto   : No Wheeler  Skin: No rashes  Access: RIJ temp HD catheter ()    LABS:                        7.1    10.99 )-----------( 218      ( 11 Sep 2023 10:00 )             22.4     11    127<L>  |  92<L>  |  67<H>  ----------------------------<  147<H>  4.4   |  25  |  2.24<H>    Ca    7.9<L>      11 Sep 2023 10:00  Phos  3.5       Mg     2.30     11            Urine Studies:  Urinalysis Basic - ( 11 Sep 2023 10:00 )    Color: Yellow / Appearance: Clear / S.020 / pH: x  Gluc: 147 mg/dL / Ketone: Trace mg/dL  / Bili: Negative / Urobili: 0.2 mg/dL   Blood: x / Protein: 300 mg/dL / Nitrite: Negative   Leuk Esterase: Small / RBC: 10 /HPF / WBC 12 /HPF   Sq Epi: x / Non Sq Epi: 1 /HPF / Bacteria: Negative /HPF            RADIOLOGY & ADDITIONAL STUDIES:  < from: US Duplex Venous Lower Ext Complete, Bilateral (09.10.23 @ 17:56) >  FINDINGS:    RIGHT:  Normal compressibility of the RIGHT common femoral, femoral and popliteal   veins.  Doppler examination shows normal spontaneous and phasic flow.  No RIGHT calf vein thrombosis is detected.    LEFT:  Normal compressibility of the LEFT common femoral and femoral veins.  Left popliteal vein shown to be not compressible. Absence of flow within   the left popliteal vein.    IMPRESSION:  Acute left deep vein thrombosis of the left popliteal vein.    --- End of Report ---      < end of copied text >            IMPRESSION: 73F w/ HTN, DM2, CVA, breast CA-bilateral mastectomy, recurrent aspiration pneumonia/respiratory failure, and CKD, 23 p/w acute hypercapnic respiratory failure; c/b RUSS    (1)CKD - stage 4     (2)RUSS - ATN vs AIN -BUN and creatinine elevated w/ electrolyte derangements, (hyponatremia, hyperkalemia, hyperphosphatemia). Improving on HD    (3)Hyponatremia - NA+ declining     (4)Hypocalcemia corrects to ~9.3 for hypalbuminemia     (5)Pulm- hypercapnic respiratory failure on admission - now s/p trach; remains on vent     (6)CV - normotensive     (7)ID - on IV Meropenem/Vanco. BCx w/ NGTD    RECOMMEND:  (1)No HD today   (2)No objection to diuretics as needed   (3)Prednisone taper now 10mg po qd x3 days  (4)Dose new meds for GFR <10/HD    Tere Arauz NP  [facility]  [phone]              [facility] [phone]        Patient seen and examined trach to paula. She had HD Saturday 2.4L removed.          MEDICATIONS  (STANDING):  albuterol/ipratropium for Nebulization 3 milliLiter(s) Nebulizer every 6 hours  atorvastatin 40 milliGRAM(s) Oral at bedtime  chlorhexidine 0.12% Liquid 15 milliLiter(s) Oral Mucosa every 12 hours  chlorhexidine 2% Cloths 1 Application(s) Topical daily  ciprofloxacin/hydrocortisone Suspension Otic 4 Drop(s) Left Ear two times a day  dextrose 5%. 1000 milliLiter(s) (50 mL/Hr) IV Continuous <Continuous>  dextrose 5%. 1000 milliLiter(s) (100 mL/Hr) IV Continuous <Continuous>  dextrose 50% Injectable 12.5 Gram(s) IV Push once  dextrose 50% Injectable 25 Gram(s) IV Push once  dextrose 50% Injectable 25 Gram(s) IV Push once  doxazosin 6 milliGRAM(s) Oral <User Schedule>  ferrous    sulfate Liquid 300 milliGRAM(s) Enteral Tube daily  FIRST- Mouthwash  BLM 10 milliLiter(s) Swish and Spit four times a day  glucagon  Injectable 1 milliGRAM(s) IntraMuscular once  heparin   Injectable 5000 Unit(s) SubCutaneous every 8 hours  insulin lispro (ADMELOG) corrective regimen sliding scale   SubCutaneous every 6 hours  insulin NPH human recombinant 3 Unit(s) SubCutaneous every 6 hours  meropenem  IVPB 500 milliGRAM(s) IV Intermittent every 12 hours  mupirocin 2% Ointment 1 Application(s) Both Nostrils two times a day  pantoprazole  Injectable 40 milliGRAM(s) IV Push two times a day  predniSONE   Tablet 10 milliGRAM(s) Oral daily  predniSONE   Tablet   Oral   sevelamer carbonate Powder 1600 milliGRAM(s) Oral three times a day  sodium chloride 3%  Inhalation 4 milliLiter(s) Inhalation every 6 hours      VITAL:  T(C): , Max: 38.3 (09-10-23 @ 21:00)  T(F): , Max: 101 (09-10-23 @ 21:00)  HR: 93 (23 @ 11:07)  BP: 140/64 (23 @ 04:00)  BP(mean): --  RR: 22 (23 @ 04:00)  SpO2: 98% (23 @ 11:07)  Wt(kg): --    I and O's:    09-10 @ 07:01  -   @ 07:00  --------------------------------------------------------  IN: 385 mL / OUT: 0 mL / NET: 385 mL          PHYSICAL EXAM:    Constitutional: trach to vent  HEENT: + tracheostomy +NGT  Respiratory: Coarse bs   Cardiovascular: tachy s1s2  Gastrointestinal: BS+, soft, NT/ND  Extremities: no peripheral edema  Neurological: uto   : No Wheeler  Skin: No rashes  Access: RIJ temp HD catheter ()    LABS:                        7.1    10.99 )-----------( 218      ( 11 Sep 2023 10:00 )             22.4     11    127<L>  |  92<L>  |  67<H>  ----------------------------<  147<H>  4.4   |  25  |  2.24<H>    Ca    7.9<L>      11 Sep 2023 10:00  Phos  3.5       Mg     2.30     11            Urine Studies:  Urinalysis Basic - ( 11 Sep 2023 10:00 )    Color: Yellow / Appearance: Clear / S.020 / pH: x  Gluc: 147 mg/dL / Ketone: Trace mg/dL  / Bili: Negative / Urobili: 0.2 mg/dL   Blood: x / Protein: 300 mg/dL / Nitrite: Negative   Leuk Esterase: Small / RBC: 10 /HPF / WBC 12 /HPF   Sq Epi: x / Non Sq Epi: 1 /HPF / Bacteria: Negative /HPF            RADIOLOGY & ADDITIONAL STUDIES:  < from: US Duplex Venous Lower Ext Complete, Bilateral (09.10.23 @ 17:56) >  FINDINGS:    RIGHT:  Normal compressibility of the RIGHT common femoral, femoral and popliteal   veins.  Doppler examination shows normal spontaneous and phasic flow.  No RIGHT calf vein thrombosis is detected.    LEFT:  Normal compressibility of the LEFT common femoral and femoral veins.  Left popliteal vein shown to be not compressible. Absence of flow within   the left popliteal vein.    IMPRESSION:  Acute left deep vein thrombosis of the left popliteal vein.    --- End of Report ---      < end of copied text >            IMPRESSION: 73F w/ HTN, DM2, CVA, breast CA-bilateral mastectomy, recurrent aspiration pneumonia/respiratory failure, and CKD, 23 p/w acute hypercapnic respiratory failure; c/b RUSS    (1)CKD - stage 4     (2)RUSS - ATN vs AIN -BUN and creatinine elevated w/ electrolyte derangements, (hyponatremia, hyperkalemia, hyperphosphatemia). Improving on HD    (3)Hyponatremia - NA+ declining     (4)Hypocalcemia corrects to ~9.3 for hypalbuminemia     (5)Pulm- hypercapnic respiratory failure on admission - now s/p trach; remains on vent     (6)CV - normotensive     (7)ID - on IV Meropenem/Vanco. BCx w/ NGTD    RECOMMEND:  (1)No HD today   (2)No objection to diuretics as needed   (3)Prednisone taper now 10mg po qd x3 days  (4)Would reduce renvela to 800 mg tid   (5)Dose new meds for GFR <10/HD    Tere Arauz NP  [facility]  [phone]              [facility] [phone]        Patient seen and examined trach to paula. She had HD Saturday 2.4L removed.          MEDICATIONS  (STANDING):  albuterol/ipratropium for Nebulization 3 milliLiter(s) Nebulizer every 6 hours  atorvastatin 40 milliGRAM(s) Oral at bedtime  chlorhexidine 0.12% Liquid 15 milliLiter(s) Oral Mucosa every 12 hours  chlorhexidine 2% Cloths 1 Application(s) Topical daily  ciprofloxacin/hydrocortisone Suspension Otic 4 Drop(s) Left Ear two times a day  dextrose 5%. 1000 milliLiter(s) (50 mL/Hr) IV Continuous <Continuous>  dextrose 5%. 1000 milliLiter(s) (100 mL/Hr) IV Continuous <Continuous>  dextrose 50% Injectable 12.5 Gram(s) IV Push once  dextrose 50% Injectable 25 Gram(s) IV Push once  dextrose 50% Injectable 25 Gram(s) IV Push once  doxazosin 6 milliGRAM(s) Oral <User Schedule>  ferrous    sulfate Liquid 300 milliGRAM(s) Enteral Tube daily  FIRST- Mouthwash  BLM 10 milliLiter(s) Swish and Spit four times a day  glucagon  Injectable 1 milliGRAM(s) IntraMuscular once  heparin   Injectable 5000 Unit(s) SubCutaneous every 8 hours  insulin lispro (ADMELOG) corrective regimen sliding scale   SubCutaneous every 6 hours  insulin NPH human recombinant 3 Unit(s) SubCutaneous every 6 hours  meropenem  IVPB 500 milliGRAM(s) IV Intermittent every 12 hours  mupirocin 2% Ointment 1 Application(s) Both Nostrils two times a day  pantoprazole  Injectable 40 milliGRAM(s) IV Push two times a day  predniSONE   Tablet 10 milliGRAM(s) Oral daily  predniSONE   Tablet   Oral   sevelamer carbonate Powder 1600 milliGRAM(s) Oral three times a day  sodium chloride 3%  Inhalation 4 milliLiter(s) Inhalation every 6 hours      VITAL:  T(C): , Max: 38.3 (09-10-23 @ 21:00)  T(F): , Max: 101 (09-10-23 @ 21:00)  HR: 93 (23 @ 11:07)  BP: 140/64 (23 @ 04:00)  BP(mean): --  RR: 22 (23 @ 04:00)  SpO2: 98% (23 @ 11:07)  Wt(kg): --    I and O's:    09-10 @ 07:01  -   @ 07:00  --------------------------------------------------------  IN: 385 mL / OUT: 0 mL / NET: 385 mL          PHYSICAL EXAM:    Constitutional: trach to vent  HEENT: + tracheostomy +NGT  Respiratory: Coarse bs   Cardiovascular: tachy s1s2  Gastrointestinal: BS+, soft, NT/ND  Extremities: no peripheral edema  Neurological: uto   : No Wheeler  Skin: No rashes  Access: RIJ temp HD catheter ()    LABS:                        7.1    10.99 )-----------( 218      ( 11 Sep 2023 10:00 )             22.4     11    127<L>  |  92<L>  |  67<H>  ----------------------------<  147<H>  4.4   |  25  |  2.24<H>    Ca    7.9<L>      11 Sep 2023 10:00  Phos  3.5       Mg     2.30     11            Urine Studies:  Urinalysis Basic - ( 11 Sep 2023 10:00 )    Color: Yellow / Appearance: Clear / S.020 / pH: x  Gluc: 147 mg/dL / Ketone: Trace mg/dL  / Bili: Negative / Urobili: 0.2 mg/dL   Blood: x / Protein: 300 mg/dL / Nitrite: Negative   Leuk Esterase: Small / RBC: 10 /HPF / WBC 12 /HPF   Sq Epi: x / Non Sq Epi: 1 /HPF / Bacteria: Negative /HPF            RADIOLOGY & ADDITIONAL STUDIES:  < from: US Duplex Venous Lower Ext Complete, Bilateral (09.10.23 @ 17:56) >  FINDINGS:    RIGHT:  Normal compressibility of the RIGHT common femoral, femoral and popliteal   veins.  Doppler examination shows normal spontaneous and phasic flow.  No RIGHT calf vein thrombosis is detected.    LEFT:  Normal compressibility of the LEFT common femoral and femoral veins.  Left popliteal vein shown to be not compressible. Absence of flow within   the left popliteal vein.    IMPRESSION:  Acute left deep vein thrombosis of the left popliteal vein.    --- End of Report ---      < end of copied text >            IMPRESSION: 73F w/ HTN, DM2, CVA, breast CA-bilateral mastectomy, recurrent aspiration pneumonia/respiratory failure, and CKD, 23 p/w acute hypercapnic respiratory failure; c/b RUSS    (1)CKD - stage 4     (2)RUSS - ATN vs AIN -BUN and creatinine elevated w/ electrolyte derangements, (hyponatremia, hyperkalemia, hyperphosphatemia). Improving on HD    (3)Hyponatremia - NA+ declining     (4)Hypocalcemia corrects to ~9.3 for hypalbuminemia     (5)Pulm- hypercapnic respiratory failure on admission - now s/p trach; remains on vent     (6)CV - normotensive     (7)ID - on IV Meropenem/Vanco. BCx w/ NGTD    RECOMMEND:  (1)No HD today   (2)No objection to diuretics as needed   (3)Prednisone taper now 10mg po qd x3 days  (4)Would reduce renvela to 800 mg tid   (5)Dose new meds for GFR <10/HD    Tere Arauz NP  [facility]  [phone]       RENAL ATTENDING NOTE  Patient seen and examined with NP. Agree with assessment and plan as above.    Jimmy Colon MD  [facility]  [phone]

## 2023-09-12 NOTE — PROGRESS NOTE ADULT - ASSESSMENT
74 y/o F well known to me from my housecal outptn practice. she was admitted at [facility] 7/12-7/22 w aspiration PNA, was treated w CEFEPIME, developed an allergic rash,  dCHF, + MAC on AFB culture, had been progressively getting more and more lethargic and dyspneic at home since DC.   In  am of 8/11/23  ptn presented with respiratory distress w hypoxia and hypercarbia requiring intubation 2/2 volume overload +/- Asp PNA      Neuro   responds appropriately   Baseline MS AOx3, aphasic   - h/o CVA , on aspirin and statin . 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 Mcnulty, now in RCU  s/p septic shock overnight 9/1, now on meropenem, HDS  s/p trache, on vent support, copious secretions      Renal   Hyperkalemia with EKG changes  , initially treated w LOKELMA,  now resolved   Ptn well know to Dr. Colon, consult reviewed    HD 8/19,  8/21, 8/26 and 8/28.  s/p PUF 8/29 2.5 Liters, HD 8/30,  9/1, 9/4  started chronic HD 9/7,   cardura DCed in order to tolerate fluid removal  no HD today      GI  NPO  on tube feeds  coffee ground emesis x 1 8/24, melena overnight 8/31, s/p 1UPRBC, EGD/Colonoscopy: erosive gastropathy, esophagisits, on colonoscopy old blood seen no active bleeding, poor prep  family to decide re PEG placement    Endocrine  T2DM   A1C 7.1 in 7/2023   - BG goal 140-200     ID   No fever/leukocytosis, recheck temp   Hx latent TB which was treated, no concern for TB   - grew MAC on AFB culture from most recent admission. at [facility]  -MSSA Bacteremia 9/1, allergic to cephalosprins and PCN, on Vanco as per ID, renal dosing,   - sputum also grew E.Coli-ESBL, as per ID recs for  Bradford through 9/7( 1 week course as per ID) , afebrile.  - 9/8:  spike  temp 38.1C, has O. externa, has a wick, recs are to get a CT to R/O an abscess/bony involvement. cont Meropenem  9/9: ptn w fever overnight to 100.8,  may need shiley pulled if bacteremic, needs NECK CT as per ENT to R/O Mastoid bone involvement while having ongoing purulent DC from L ear 2/2 O. externa, getting daily wick changes  9/10:  spiked 102 overnight through Bradford and Vanco, purulent DC from O. externa, ENT recommend Ct of mastoid. ptn in HD, has Shiley in place, consider pulling shiley and send for Cx. would d/w Renal, and consider contacting  ID, last seen by Dr. Conner 9/6 9/11: remains febrile, Dr. Conner reconsulted. cont Bradford, Vanco  9/12: tmax 100.5, fever curve is improving, awaiting Left ear CT, on Bradford since 9/1. on  vanco for MSSA Bacteremia, does not tolerate cephalosporins. through 10/2            Heme/Onc  ppx: place on SCD  Transfuse for hgb 6.4, no obv bleed. HDS  LEFT POPLITEAL VEIN ACUTE DVT on 9/10    Ethics  GOC - Discussed GOC with daughter and , they have opted in the past for full code. and she remains full code at present

## 2023-09-12 NOTE — CHART NOTE - NSCHARTNOTEFT_GEN_A_CORE
Notified by RN that patient has been pulling at NGT x2. Patient placed on bilateral mittens. Daughter is present with patient in the room and is agreeable to the plan. Will continue to monitor overnight.

## 2023-09-12 NOTE — PROGRESS NOTE ADULT - ASSESSMENT
72 y/o F DM on insulin, HTN, CKD,breast CA, respiratory arrest and cardiac arrest (2018), CVA with residual weakness, aspiration PNA h/o trache, PEG, both removed presents with respiratory distress requiring intubation. Had recent admission d/c 7/22/23 for cough and respiratory distress (no intubation) thought to be i/s/o aspiration PNA s/p cefepime course; developed rash in response. Infectious w/u was negative at this time. Was discharged home, daughter and  at bedside providing history. Patient admitted to MICU for higher level of care, intubated & sedated on fentanyl & propofol. While in the MICU, patient was observed to have RUE shaking. Ativan given & EEG started, pending final report. Neurology consulted for further recommendations. Patient unable to offer history at this time. At bedside, daughter &  offer collateral. As per daughter, patient follows with Dr. Garner for stroke management since 2018 and Dr. Poon for tremors. Patient's tremors usually consist of RUE or head shaking, can be stopped by the patient with effort; this has been ongoing for 2-3 years. However, as of the past 2-3 weeks, patient has been experiencing more frequent, more severe RUE shaking intermittently; during these episodes patient is awake and alert. Does not take medications for tremors. No hx of vocal tremors. No hx of seizure, incontinence, tongue bite. For memory loss, patient has been taking Donepezil. Family inquiring about taking alternative supplements instead of Donepezil. At baseline patient wheelchair/bedbound and requires assistance with ADLs. CTH obtained 8/11, no interval change noted. EEG w/o evidence of seizures. Concern for dysconjugate gaze and roving eye movements 8/15, repeat CTH unchanged but unable to visualize brainstem well. Leukocytosis increasing    Impression:   1) Worsening RUE shaking of unclear etiology at this time; no evidence of seizures, resolved   2) AMS possibly related to TME, no sz on EEG. Dysconjugate gaze and roving eye movements improved. CTH unchanged although not able to visualize brainstem well. Can consider uremic encephaloapthy as kidney function worsening/ infectious encephalopathy   3) L PCA stroke, ESUS s/p ILR, also with bilateral centrum semiovale watershed infarcts   4) New Embolic strokes seen on MRI 8/17 - R cerebellum, midbrain, multiple other tiny embolic infarcts. Central midbrain stroke may be responsible for inability to trigger vent   5) Dementia   6) MSSA bacteremia, r/o endocarditis  7) Acute popliteal DVT     Recommendations   [] appreciate ID recs -  on Vanco for MSSA bacteremia and Meropenem for E Coli   [] repeat TTE or consider STEPHANIE to rule out endocarditis   [] GI following for coffee ground emesis - esophagitis seen on colonoscopy, monitor for bleeding now that on heparin gtt  [] family declined kidney biopsy for AIN -> s/p steroid tx   [] C/w home ASA 81 mg if no contraindications   [] on hep gtt for DVT, can target normal PTT range as stroke more remote at this point   [] C/w home Atorvastatin 10 mg qhs   [] would interrogate ILR and review tele to see if pAF -. no AF seen  [] DVT/GI ppx  [] Neurochecks q4hr   [] BP goals: Normotension   [] PT/OT when able   [] Diet: NPO w/ tube feeds, family considering PEG    [] HgA1C goals < 7.0   [] continue to address GOC with family    D/w  at bedside     Katty Charles DO  Vascular Neurology  Office [phone]

## 2023-09-12 NOTE — PROGRESS NOTE ADULT - ASSESSMENT
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 While in MICU patient noted with worsening renal failure requiring HD initiation, CGE/ Melena s/p EGD 8/31 found with esophagitis and erosive gastropathy, new right cerebellar infarct and fevers second to ESBL COLI and MSSA VAP, MSSA bacteremia, left ear otitis externa and drug rxn to cephalosporins Course further complicated by prolonged vent time s/p tracheostomy 9/1 and transferred to RCU 9/3 completed by recurrent fevers with high PIP, respiratory acidosis and concern for volume overloaded.     NEUROLOGY  # Metabolic Encephalopath vs NEW cerebella infarct   - Baseline MS AOX3 with short term memory changes per family however noted with concern for poor mentation in ICU  - MRI (8/17) with NEW right cerebellar infarct and old left PCA/occipital infarcts  - STEPHANIE (8/17) with AV showing two linear mobile echogenic elements attached to valve leaflets consistent with Lambel's excrescence, but no TRINITY thrombus, and lambel's excrescence with uncertain clinical implication.   - EEG (8/11-8/15) with no seizures   - ILR interrogation (9/7) with SR and PACs falsely recorded as AF.   - Attempted to resume ASA for medical management but held given GIB   - Continue on lipitor for medical management   - Course complicated by questionable head and body shaking 9/8 however prolactin elevated and shakes head yes/no to some questions.   - Lethargy more likely second to respiratory acidosis and will hold on RPT EEG    - Monitor mentation closely Awakens to verbal stimuli     CARDIOVASCULAR  # HTN Urgency   # Septic vs vasoplegic shock   - Labile BPs ranging in between SBP 80s-200s and attempted labetalol, however noted with hypotension and bradycardia likely from BB toxicity vs shock state?    - Holding Labetalol and Catapres   - Continue on Cardura for now however if BP soft preventing fluid removal then hold Cardura     # Hx of HFpEF with likely ADHF with volume overload.   - ECHO (7/2023) with EF 59 with severe LVH and diastolic dysfunction   - STEPHANIE (8/18) with normal LVRVSF however noted with increased LA pressures and persistent LA septal bowing into RA.   - ECHO (8/11) with EF 60 with mild LVH, normal LVRVSF, and mild ddfxn.  - Remove volume with HD sessions and intermittent bumex pushes as BP allows       HEENT   # Left ear OE   - ENT evaluation (9/7) with no mastoid tenderness, however found with positive swelling and excoriation to left auricle and tenderness to manipulation of auricle. Debrided crusting of auricle and EAC evaluated with edema and unable to visualize TM. EAC debrided and found with watery exudate.   - Continue on CiproHC (9/5 - )   - Continue on Bradford (9/1 - ) as below   - ENT following and ear wick change QD   - Wick out but needs ? debridement wound care called/fu ent     # Oral Lesions with questionable Zoster vs Trauma?   - Patient with tongue pain and seen with anterior ulcerations consolidating and crusting and posterior ulceration.  - Case discussed with pathology resident and culture/ cytology sent.   - HSV negative and Path (9/6) with normal appearing epithelial cells singly and in clusters devoid of viral cytopathic effect.   - Continue on magic mouth wash for pain    RESPIRATORY  # AHHRF second to volume overload   - Hx of CKD and HFpEF presented with SOB and hypercarbia second to volume overload requiring intubation and HD initiation   - Vent weaning attempted however limited requiring tracheostomy (9/1) with IP   - Course complicated by PIPs elevated (50s) and respiratory acidosis second to secretions vs volume ?    - Vent adjusted 20/300/5/30 without improvement.   - POCUS (9/8) with BL pleural effusions (large on right and small on left)   - CT CHEST (9/8) with TBM, BL pleural effusions and continued consolidations   - Continue on vent and given TBM increased PEEP (9/9) to 20/300/8/40 with overall improvement   - Continue on duonebs and HTS for airway clearance   - Continue volume removal with diuresis and aggressive UF sessions.   - Discussing possible thora but appears improved and will monitor.  ]  - Bedside US showing decrease in pleural effusion - hold off thoracentesis 9/10     GI  # UGIB   - CGE x 1 episode on 8/24 and melena x 2 episodes on 8/31 likely residual blood.   - EGD (8/31) found with esophagitis and erosive gastropathy  - Continue on PPI BID through late October and then PPI QD   - No melena     # Dysphagia   - NGT-TF continued   - Pending PEG however needs improvement in infectious status prior to placement.     RENAL  # Progressive CKD   - Presented volume overload and progressive RUSS on CKD (baseline CRE in 2s and mode into the 3s on admission) likely obstruction vs low flow state with shock vs AIN from drug reaction issue?  - POCUS with no signs of hydronephrosis   - Pressor support started with improvement in renal function  - Attempted steroid course in ICU without improvement in renal function   - Case discussed at length with renal and initiated on HD in ICU and now continued on HD TTHS, however remains volume overloaded.   - Patient oliguric however responds to Bumex pushes   - Continue on aggressive UF sessions with HD TTHS and bumex pushes as BP allows   - Monitor renal function and UOP     # Hyperphosphatemia   - PTH normal and elevated phos likely from renal failure  - Phos binder with Renvela started with improvement.     INFECTIOUS DISEASE  # ESBL ECOLI and MSSA VAP c/b MSSA bacteremia   - RVP/ COVID (8/11) negative   - SCx (8/11) with MSSA s/p cefepime (8/12-8/13) and then ancef (8/14) however noted with drug reaction and then changed to vanco and aztreonam (8/12-8/13) in ICU .   - Course complicated by recurrent fevers and return of MSSA with bacteremia.   - SCx (9/1) with MSSA and ESBL ECOLI   - BAL (9/1) with MSSA   - BCx (9/1) with MSSA and cleared on (9/4)   - ECHO (9/6) unable to rule out endocarditis   - Continue on Bradford (9/4 - ) and Vanco BY DOSE POST HD (9/4, 9/7, 9/9 ...) with duration TBD at this time.   - Given inability to rule out endocarditis will discuss possible 4 wks?   - Course complicated by fever (9/7) and UA with leuks but no bacteria, however compared to prior improved, RVP/COVID and SCx negative, and RPT CXR similar to prior   - Pending BCx, SCx, UCx, and LE DOPPLERS  - Febrile overnight 102 recultured and sent off   -Pt recieving vanco post HD however today given vanco 750mg x 1 will check Vanco level at end of HD session and dose     # Left ear OE   - ENT evaluation (9/7) with no mastoid tenderness, however found with positive swelling and excoriation to left auricle and tenderness to manipulation of auricle. Debrided crusting of auricle and EAC evaluated with edema and unable to visualize TM. EAC debrided and found with watery exudate.   - Continue on CiproHC (9/5 - )   - Continue on Bradford (9/1 - ) as below   - Given fevers pending CT TEMPORAL BONE - CT head done Auditory  CT ordered to better eval temporal bone /ear     # MRSA PCRs   - MRSA PCR (8/11 and 8/14) negative   - Next MRSA + PCR ordered for 10/8     HEME  # Anemia second to GIB vs renal disease   - Hold chemical DVT PPX given bleeding/ waxing and waning HH   - s/p multiple units of PRBCs (8/15, 8/21, 8/28, 8/31 and 9/8)   - Anemia panel with AOCD but with low %sat and ferrous sulfate added to optimize   - Monitor HH and discuss with renal for EPO sometime next week.   - SPoke with GI for clearance to start Heparin gtt for + DVT     Vascular  # DVT  - Pt with + popliteal DVT on SCD Spoke with GI no absolute contraindication for starting Heparin - advise close monitoring for bleeding - Do stat CTA if bleeding occurs and call IR   - Pt specific heparin gtt started with goal PTT 60-85      ONC   # Hx of Breast CA   - Patient dx in 2018 and s/p BL mastectomy (radical on right) and chemo and RT.   - Supportive care.     ENDOCRINE  # IDDM2   - Continue on NPH 3U and ISS   - Monitor FS and adjust as needed     LINES/ TUBES  - R ARTHUR TAYLOR (8/19 - )     SKIN  # Drug Eruption   - Attempted cefepime (8/12) vs ancef (8/13-8/14) and then noted with drug rxn.   - Hold further cephalosporins at this time   - Continue on steroids with prednisone 40 (8/18 - 9/2) then prednisone 30 (9/3-9/7), then prednisone 20 (9/8 - 9/10), then prednisone 10mg (9/11-9/13) then turn off.     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 While in MICU patient noted with worsening renal failure requiring HD initiation, CGE/ Melena s/p EGD 8/31 found with esophagitis and erosive gastropathy, new right cerebellar infarct and fevers second to ESBL COLI and MSSA VAP, MSSA bacteremia, left ear otitis externa and drug rxn to cephalosporins Course further complicated by prolonged vent time s/p tracheostomy 9/1 and transferred to RCU 9/3 completed by recurrent fevers with high PIP, respiratory acidosis and concern for volume overloaded.     NEUROLOGY  # Metabolic Encephalopath vs NEW cerebella infarct   - Baseline MS AOX3 with short term memory changes per family however noted with concern for poor mentation in ICU  - MRI (8/17) with NEW right cerebellar infarct and old left PCA/occipital infarcts  - STEPHANIE (8/17) with AV showing two linear mobile echogenic elements attached to valve leaflets consistent with Lambel's excrescence, but no TRINITY thrombus, and lambel's excrescence with uncertain clinical implication.   - EEG (8/11-8/15) with no seizures   - ILR interrogation (9/7) with SR and PACs falsely recorded as AF.   - Attempted to resume ASA for medical management but held given GIB   - Continue on lipitor for medical management   - Course complicated by questionable head and body shaking 9/8 however prolactin elevated and shakes head yes/no to some questions.   - Lethargy more likely second to respiratory acidosis and will hold on RPT EEG    - Monitor mentation closely Awakens to verbal stimuli     CARDIOVASCULAR  # HTN Urgency   # Septic vs vasoplegic shock   - Labile BPs ranging in between SBP 80s-200s and attempted labetalol, however noted with hypotension and bradycardia likely from BB toxicity vs shock state?    - Holding Labetalol and Catapres   - Continue on Cardura for now however if BP soft preventing fluid removal then hold Cardura     # Hx of HFpEF with likely ADHF with volume overload.   - ECHO (7/2023) with EF 59 with severe LVH and diastolic dysfunction   - STEPHANIE (8/18) with normal LVRVSF however noted with increased LA pressures and persistent LA septal bowing into RA.   - ECHO (8/11) with EF 60 with mild LVH, normal LVRVSF, and mild ddfxn.  - Remove volume with HD sessions and intermittent bumex pushes as BP allows       HEENT   # Left ear OE   - ENT evaluation (9/7) with no mastoid tenderness, however found with positive swelling and excoriation to left auricle and tenderness to manipulation of auricle. Debrided crusting of auricle and EAC evaluated with edema and unable to visualize TM. EAC debrided and found with watery exudate.   - Continue on CiproHC (9/5 - )   - Continue on Bradford (9/1 - ) as below   - ENT following and ear wick change QD   - Wick out but needs ? debridement wound care called/fu ent   - ENT recommending CT IAC w/ IV con but family is refusing as concerned given CKD, kidneys wouldn't recover from IV contrast. Spoke with Nephrology, ENT, and patient's  at length. Planned for CT non con and per , decision will be made from there but for now doesn't want to risk further harming kidneys.    # Oral Lesions with questionable Zoster vs Trauma?   - Patient with tongue pain and seen with anterior ulcerations consolidating and crusting and posterior ulceration.  - Case discussed with pathology resident and culture/ cytology sent.   - HSV negative and Path (9/6) with normal appearing epithelial cells singly and in clusters devoid of viral cytopathic effect.   - Continue on magic mouth wash for pain    RESPIRATORY  # AHHRF second to volume overload   - Hx of CKD and HFpEF presented with SOB and hypercarbia second to volume overload requiring intubation and HD initiation   - Vent weaning attempted however limited requiring tracheostomy (9/1) with IP   - Course complicated by PIPs elevated (50s) and respiratory acidosis second to secretions vs volume ?    - Vent adjusted 20/300/5/30 without improvement.   - POCUS (9/8) with BL pleural effusions (large on right and small on left)   - CT CHEST (9/8) with TBM, BL pleural effusions and continued consolidations   - Continue on vent and given TBM increased PEEP (9/9) to 20/300/8/40 with overall improvement   - Continue on duonebs and HTS for airway clearance   - Continue volume removal with diuresis and aggressive UF sessions.   - Discussing possible thora but appears improved and will monitor.  ]  - Bedside US showing decrease in pleural effusion - hold off thoracentesis 9/10     GI  # UGIB   - CGE x 1 episode on 8/24 and melena x 2 episodes on 8/31 likely residual blood.   - EGD (8/31) found with esophagitis and erosive gastropathy  - Continue on PPI BID through late October and then PPI QD   - No melena     # Dysphagia   - NGT-TF continued   - Pending PEG however needs improvement in infectious status prior to placement.     RENAL  # Progressive CKD   - Presented volume overload and progressive RUSS on CKD (baseline CRE in 2s and mode into the 3s on admission) likely obstruction vs low flow state with shock vs AIN from drug reaction issue?  - POCUS with no signs of hydronephrosis   - Pressor support started with improvement in renal function  - Attempted steroid course in ICU without improvement in renal function   - Case discussed at length with renal and initiated on HD in ICU and now continued on HD TTHS, however remains volume overloaded.   - Patient oliguric however responds to Bumex pushes   - Continue on aggressive UF sessions with HD TTHS and bumex pushes as BP allows   - Monitor renal function and UOP     # Hyperphosphatemia   - PTH normal and elevated phos likely from renal failure  - Phos binder with Renvela started with improvement.     INFECTIOUS DISEASE  # ESBL ECOLI and MSSA VAP c/b MSSA bacteremia   - RVP/ COVID (8/11) negative   - SCx (8/11) with MSSA s/p cefepime (8/12-8/13) and then ancef (8/14) however noted with drug reaction and then changed to vanco and aztreonam (8/12-8/13) in ICU .   - Course complicated by recurrent fevers and return of MSSA with bacteremia.   - SCx (9/1) with MSSA and ESBL ECOLI   - BAL (9/1) with MSSA   - BCx (9/1) with MSSA and cleared on (9/4)   - ECHO (9/6) unable to rule out endocarditis   - Continue on Bradford (9/4 - ) and Vanco BY DOSE POST HD (9/4, 9/7, 9/9 ...) with duration TBD at this time.   - Given inability to rule out endocarditis will discuss possible 4 wks?   - Course complicated by fever (9/7) and UA with leuks but no bacteria, however compared to prior improved, RVP/COVID and SCx negative, and RPT CXR similar to prior   - Pending BCx, SCx, UCx, and LE DOPPLERS- Blood and sputum cx NTD; Acute left deep vein thrombosis of the left popliteal vein.  - Febrile overnight 102 recultured and sent off   -Pt receiving vanco post HD however today given vanco 750mg x 1 will check Vanco level at end of HD session and dose   -     # Left ear OE   - ENT evaluation (9/7) with no mastoid tenderness, however found with positive swelling and excoriation to left auricle and tenderness to manipulation of auricle. Debrided crusting of auricle and EAC evaluated with edema and unable to visualize TM. EAC debrided and found with watery exudate.   - Continue on CiproHC (9/5 - )   - Continue on Bradford (9/1 - ) as below   - Given fevers pending CT TEMPORAL BONE - CT head done Auditory  CT ordered to better eval temporal bone /ear     # MRSA PCRs   - MRSA PCR (8/11 and 8/14) negative   - Next MRSA + PCR ordered for 10/8     HEME  # Anemia second to GIB vs renal disease   - Hold chemical DVT PPX given bleeding/ waxing and waning HH   - s/p multiple units of PRBCs (8/15, 8/21, 8/28, 8/31 and 9/8)   - Anemia panel with AOCD but with low %sat and ferrous sulfate added to optimize   - Monitor HH and discuss with renal for EPO sometime next week.   - SPoke with GI for clearance to start Heparin gtt for + DVT     Vascular  # DVT  - Pt with + popliteal DVT on SCD Spoke with GI no absolute contraindication for starting Heparin - advise close monitoring for bleeding - Do stat CTA if bleeding occurs and call IR   - Pt specific heparin gtt started with goal PTT 60-90      ONC   # Hx of Breast CA   - Patient dx in 2018 and s/p BL mastectomy (radical on right) and chemo and RT.   - Supportive care.     ENDOCRINE  # IDDM2   - Continue on NPH 3U and ISS   - Monitor FS and adjust as needed     LINES/ TUBES  - R ARTHUR TAYLOR (8/19 - )     SKIN  # Drug Eruption   - Attempted cefepime (8/12) vs ancef (8/13-8/14) and then noted with drug rxn.   - Hold further cephalosporins at this time   - Continue on steroids with prednisone 40 (8/18 - 9/2) then prednisone 30 (9/3-9/7), then prednisone 20 (9/8 - 9/10), then prednisone 10mg (9/11-9/13) then turn off.     ETHICS/ GOC    - FULL CODE     DISPO - TBD

## 2023-09-12 NOTE — PROGRESS NOTE ADULT - NS ATTEND AMEND GEN_ALL_CORE FT
Left pinna chondritis with crusting--continue IV abx to cover against pseudomonas, conservative bedside debridement Left pinna chondritis with crusting--continue IV abx to cover against pseudomonas, conservative bedside debridement; head CT images personally reviewed by me and show left EAC opacification, WBC reviewed and was 9.68 today.

## 2023-09-12 NOTE — PROGRESS NOTE ADULT - SUBJECTIVE AND OBJECTIVE BOX
Subjective: Patient seen and examined. No new events except as noted.     REVIEW OF SYSTEMS:  Unable to obtain    MEDICATIONS:  MEDICATIONS  (STANDING):  albuterol/ipratropium for Nebulization 3 milliLiter(s) Nebulizer every 6 hours  atorvastatin 40 milliGRAM(s) Oral at bedtime  chlorhexidine 0.12% Liquid 15 milliLiter(s) Oral Mucosa every 12 hours  chlorhexidine 2% Cloths 1 Application(s) Topical daily  ciprofloxacin/hydrocortisone Suspension Otic 4 Drop(s) Left Ear two times a day  dextrose 5%. 1000 milliLiter(s) (50 mL/Hr) IV Continuous <Continuous>  dextrose 5%. 1000 milliLiter(s) (100 mL/Hr) IV Continuous <Continuous>  dextrose 50% Injectable 12.5 Gram(s) IV Push once  dextrose 50% Injectable 25 Gram(s) IV Push once  dextrose 50% Injectable 25 Gram(s) IV Push once  doxazosin 6 milliGRAM(s) Oral <User Schedule>  ferrous    sulfate Liquid 300 milliGRAM(s) Enteral Tube daily  FIRST- Mouthwash  BLM 10 milliLiter(s) Swish and Spit four times a day  glucagon  Injectable 1 milliGRAM(s) IntraMuscular once  heparin  Infusion 1000 Unit(s)/Hr (9 mL/Hr) IV Continuous <Continuous>  insulin lispro (ADMELOG) corrective regimen sliding scale   SubCutaneous every 6 hours  insulin NPH human recombinant 3 Unit(s) SubCutaneous every 6 hours  meropenem  IVPB 500 milliGRAM(s) IV Intermittent every 12 hours  mupirocin 2% Ointment 1 Application(s) Both Nostrils two times a day  pantoprazole  Injectable 40 milliGRAM(s) IV Push two times a day  predniSONE   Tablet 10 milliGRAM(s) Oral daily  predniSONE   Tablet   Oral   sevelamer carbonate Powder 800 milliGRAM(s) Oral three times a day  sodium chloride 3%  Inhalation 4 milliLiter(s) Inhalation every 6 hours      PHYSICAL EXAM:  T(C): 36.9 (09-12-23 @ 06:18), Max: 38.1 (09-11-23 @ 19:00)  HR: 95 (09-12-23 @ 09:52) (90 - 108)  BP: 117/45 (09-12-23 @ 06:18) (100/50 - 131/47)  RR: 24 (09-12-23 @ 06:18) (22 - 28)  SpO2: 97% (09-12-23 @ 09:52) (93% - 98%)  Wt(kg): --  I&O's Summary    11 Sep 2023 07:01  -  12 Sep 2023 07:00  --------------------------------------------------------  IN: 1540 mL / OUT: 175 mL / NET: 1365 mL          Appearance: NAD, +trach  HEENT: dry oral mucosa  Lymphatic: No lymphadenopathy  Cardiovascular: Normal S1 S2, No JVD, No murmurs, No edema  Respiratory: Decreased BS, + trach to vent	  Neuro: opens eyes  Gastrointestinal: Soft, Non-tender, + BS, + OGT	  Skin: No rashes, No ecchymoses, No cyanosis	  Extremities: No strength/ROM 2/2 sedation, + BL LE edema  Vascular: Peripheral pulses palpable 2+ bilaterally          LABS:    CARDIAC MARKERS:                                7.0    9.68  )-----------( 232      ( 12 Sep 2023 05:10 )             21.5     09-12    126<L>  |  89<L>  |  75<H>  ----------------------------<  141<H>  4.4   |  23  |  2.40<H>    Ca    8.1<L>      12 Sep 2023 05:10  Phos  3.5     09-12  Mg     2.50     09-12          TELEMETRY: 	    ECG:  	  RADIOLOGY:   DIAGNOSTIC TESTING:  [ ] Echocardiogram:  [ ]  Catheterization:  [ ] Stress Test:    OTHER:

## 2023-09-12 NOTE — PROGRESS NOTE ADULT - SUBJECTIVE AND OBJECTIVE BOX
ENT ISSUE/POD: left otitis externa    HPI: Patient seen and examined at bedside. Patient's  at bedside during the exam. Patient is on full vent support, not able to answer any questions, looks very uncomfortable and keeps shaking her head.      PAST MEDICAL & SURGICAL HISTORY:  Breast CA      Diabetes      Stroke      Cardiac arrest      HTN (hypertension)      H/O mastectomy, bilateral        Allergies    isoniazid (Rash)  nafcillin (Unknown)  hydrALAZINE (Rash)  vitamin E (Short breath; Urticaria; Hives)  doxycycline (Rash)  cefepime (Rash)  NIFEdipine (Urticaria; Hives)    Intolerances      MEDICATIONS  (STANDING):  albuterol/ipratropium for Nebulization 3 milliLiter(s) Nebulizer every 6 hours  atorvastatin 40 milliGRAM(s) Oral at bedtime  chlorhexidine 0.12% Liquid 15 milliLiter(s) Oral Mucosa every 12 hours  chlorhexidine 2% Cloths 1 Application(s) Topical daily  ciprofloxacin/hydrocortisone Suspension Otic 4 Drop(s) Left Ear two times a day  dextrose 5%. 1000 milliLiter(s) (50 mL/Hr) IV Continuous <Continuous>  dextrose 5%. 1000 milliLiter(s) (100 mL/Hr) IV Continuous <Continuous>  dextrose 50% Injectable 12.5 Gram(s) IV Push once  dextrose 50% Injectable 25 Gram(s) IV Push once  dextrose 50% Injectable 25 Gram(s) IV Push once  doxazosin 6 milliGRAM(s) Oral <User Schedule>  ferrous    sulfate Liquid 300 milliGRAM(s) Enteral Tube daily  FIRST- Mouthwash  BLM 10 milliLiter(s) Swish and Spit four times a day  glucagon  Injectable 1 milliGRAM(s) IntraMuscular once  heparin  Infusion 1000 Unit(s)/Hr (9 mL/Hr) IV Continuous <Continuous>  insulin lispro (ADMELOG) corrective regimen sliding scale   SubCutaneous every 6 hours  insulin NPH human recombinant 3 Unit(s) SubCutaneous every 6 hours  meropenem  IVPB 500 milliGRAM(s) IV Intermittent every 12 hours  mupirocin 2% Ointment 1 Application(s) Both Nostrils two times a day  pantoprazole  Injectable 40 milliGRAM(s) IV Push two times a day  predniSONE   Tablet 10 milliGRAM(s) Oral daily  predniSONE   Tablet   Oral   sevelamer carbonate Powder 800 milliGRAM(s) Oral three times a day  sodium chloride 3%  Inhalation 4 milliLiter(s) Inhalation every 6 hours    MEDICATIONS  (PRN):  acetaminophen   Oral Liquid .. 650 milliGRAM(s) Oral every 6 hours PRN Temp greater or equal to 38C (100.4F), Mild Pain (1 - 3)  dextrose Oral Gel 15 Gram(s) Oral once PRN Blood Glucose LESS THAN 70 milliGRAM(s)/deciliter      Social History: see consult note    Family history: see consult note    ROS:   ENT: all negative except as noted in HPI   Pulm: denies SOB, cough, hemoptysis  Neuro: denies numbness/tingling, loss of sensation  Endo: denies heat/cold intolerance, excessive sweating      Vital Signs Last 24 Hrs  T(C): 36.9 (12 Sep 2023 06:18), Max: 38.1 (11 Sep 2023 19:00)  T(F): 98.4 (12 Sep 2023 06:18), Max: 100.5 (11 Sep 2023 19:00)  HR: 98 (12 Sep 2023 07:17) (90 - 108)  BP: 117/45 (12 Sep 2023 06:18) (100/50 - 131/47)  BP(mean): --  RR: 24 (12 Sep 2023 06:18) (22 - 28)  SpO2: 94% (12 Sep 2023 07:17) (93% - 98%)    Parameters below as of 12 Sep 2023 06:18  Patient On (Oxygen Delivery Method): ventilator, AC  O2 Flow (L/min): 35                            7.0    9.68  )-----------( 232      ( 12 Sep 2023 05:10 )             21.5    09-12    126<L>  |  89<L>  |  75<H>  ----------------------------<  141<H>  4.4   |  23  |  2.40<H>    Ca    8.1<L>      12 Sep 2023 05:10  Phos  3.5     09-12  Mg     2.50     09-12     PT/INR - ( 11 Sep 2023 16:05 )   PT: 10.1 sec;   INR: <0.90 ratio         PTT - ( 12 Sep 2023 05:10 )  PTT:102.4 sec    PHYSICAL EXAM:  Gen: NAD  Skin: No rashes, bruises, or lesions  Head: Normocephalic, Atraumatic  Face: no edema, erythema, or fluctuance. Parotid glands soft without mass  Eyes: no scleral injection  Ears: Left - external conchal bowl with slightly worsening necrotic tissue and extended erythema to the opening of external ear canal. External ear canal filled with whitish debris, removed via alligator and suction. TM intact without effusion or erythema. + clear fluid drainage noted. No mastoid tenderness, erythema, or ear bulging  Nose: Nares bilaterally patent, no discharge  Mouth: No Stridor / Drooling / Trismus.  Mucosa moist, tongue/uvula midline, oropharynx clear  Neck: +tracheostomy tube in place. Flat, supple, no lymphadenopathy, trachea midline, no masses  Lymphatic: No lymphadenopathy  Resp:on full ventilator support breathing easily, no stridor  Neuro: facial nerve intact, no facial droop         ENT ISSUE/POD: left otitis externa    HPI: Patient seen and examined at bedside. Patient's  at bedside during the exam. Patient is on full vent support, not able to answer any questions, looks very uncomfortable and keeps shaking her head.      PAST MEDICAL & SURGICAL HISTORY:  Breast CA      Diabetes      Stroke      Cardiac arrest      HTN (hypertension)      H/O mastectomy, bilateral        Allergies    isoniazid (Rash)  nafcillin (Unknown)  hydrALAZINE (Rash)  vitamin E (Short breath; Urticaria; Hives)  doxycycline (Rash)  cefepime (Rash)  NIFEdipine (Urticaria; Hives)    Intolerances      MEDICATIONS  (STANDING):  albuterol/ipratropium for Nebulization 3 milliLiter(s) Nebulizer every 6 hours  atorvastatin 40 milliGRAM(s) Oral at bedtime  chlorhexidine 0.12% Liquid 15 milliLiter(s) Oral Mucosa every 12 hours  chlorhexidine 2% Cloths 1 Application(s) Topical daily  ciprofloxacin/hydrocortisone Suspension Otic 4 Drop(s) Left Ear two times a day  dextrose 5%. 1000 milliLiter(s) (50 mL/Hr) IV Continuous <Continuous>  dextrose 5%. 1000 milliLiter(s) (100 mL/Hr) IV Continuous <Continuous>  dextrose 50% Injectable 12.5 Gram(s) IV Push once  dextrose 50% Injectable 25 Gram(s) IV Push once  dextrose 50% Injectable 25 Gram(s) IV Push once  doxazosin 6 milliGRAM(s) Oral <User Schedule>  ferrous    sulfate Liquid 300 milliGRAM(s) Enteral Tube daily  FIRST- Mouthwash  BLM 10 milliLiter(s) Swish and Spit four times a day  glucagon  Injectable 1 milliGRAM(s) IntraMuscular once  heparin  Infusion 1000 Unit(s)/Hr (9 mL/Hr) IV Continuous <Continuous>  insulin lispro (ADMELOG) corrective regimen sliding scale   SubCutaneous every 6 hours  insulin NPH human recombinant 3 Unit(s) SubCutaneous every 6 hours  meropenem  IVPB 500 milliGRAM(s) IV Intermittent every 12 hours  mupirocin 2% Ointment 1 Application(s) Both Nostrils two times a day  pantoprazole  Injectable 40 milliGRAM(s) IV Push two times a day  predniSONE   Tablet 10 milliGRAM(s) Oral daily  predniSONE   Tablet   Oral   sevelamer carbonate Powder 800 milliGRAM(s) Oral three times a day  sodium chloride 3%  Inhalation 4 milliLiter(s) Inhalation every 6 hours    MEDICATIONS  (PRN):  acetaminophen   Oral Liquid .. 650 milliGRAM(s) Oral every 6 hours PRN Temp greater or equal to 38C (100.4F), Mild Pain (1 - 3)  dextrose Oral Gel 15 Gram(s) Oral once PRN Blood Glucose LESS THAN 70 milliGRAM(s)/deciliter      Social History: see consult note    Family history: see consult note    ROS: unable to answer on full ventilator support      Vital Signs Last 24 Hrs  T(C): 36.9 (12 Sep 2023 06:18), Max: 38.1 (11 Sep 2023 19:00)  T(F): 98.4 (12 Sep 2023 06:18), Max: 100.5 (11 Sep 2023 19:00)  HR: 98 (12 Sep 2023 07:17) (90 - 108)  BP: 117/45 (12 Sep 2023 06:18) (100/50 - 131/47)  BP(mean): --  RR: 24 (12 Sep 2023 06:18) (22 - 28)  SpO2: 94% (12 Sep 2023 07:17) (93% - 98%)    Parameters below as of 12 Sep 2023 06:18  Patient On (Oxygen Delivery Method): ventilator, AC  O2 Flow (L/min): 35                            7.0    9.68  )-----------( 232      ( 12 Sep 2023 05:10 )             21.5    09-12    126<L>  |  89<L>  |  75<H>  ----------------------------<  141<H>  4.4   |  23  |  2.40<H>    Ca    8.1<L>      12 Sep 2023 05:10  Phos  3.5     09-12  Mg     2.50     09-12     PT/INR - ( 11 Sep 2023 16:05 )   PT: 10.1 sec;   INR: <0.90 ratio         PTT - ( 12 Sep 2023 05:10 )  PTT:102.4 sec    PHYSICAL EXAM:  Gen: NAD  Skin: No rashes, bruises, or lesions  Head: Normocephalic, Atraumatic  Face: no edema, erythema, or fluctuance. Parotid glands soft without mass  Eyes: no scleral injection  Ears: Left - external conchal bowl with slightly worsening necrotic tissue and extended erythema to the opening of external ear canal. External ear canal filled with whitish debris, removed via alligator and suction. TM intact without effusion or erythema. + clear fluid drainage noted. No mastoid tenderness, erythema, or ear bulging  Nose: Nares bilaterally patent, no discharge  Mouth: No Stridor / Drooling / Trismus.  Mucosa moist, tongue/uvula midline, oropharynx clear  Neck: +tracheostomy tube in place. Flat, supple, no lymphadenopathy, trachea midline, no masses  Lymphatic: No lymphadenopathy  Resp:on full ventilator support breathing easily, no stridor  Neuro: facial nerve intact, no facial droop

## 2023-09-12 NOTE — PROGRESS NOTE ADULT - ASSESSMENT
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 family declined renal biopsy started on prednisone  s/p trach 9/1 and BAL with MSSA   ET cx with ESBL and MSSA  started on ana crsitina 9/1  blood cx 9/1: MSSA   repeat negative 9/4, 9/8  CXR with b/l opacities, R increased  L ear edema and otitis externa    initial  resp failure for ?fluid ovrer load but also had MSSA in the sputum, got vanco, aztreonam one day then cefepime and had rash, renal failure which was considered due to cefepime and then received 2 days of cefazolin and then off, now with trach and bronc on 9/1 with MSSA bacteremia and BAL also MSSA  another ET cx with MSSA and ESBL E-coli  hypotension, MSSA bacteremia likely due to pneumonia, repeat blood cx negative 9/4, TTE limited unable to exclude endocarditis  L otitis externa on ana cristina since 9/1 but worsening edema and black discoloration with bullae forming and persistent fever (ear cx was negative)    * f/u with ENT for ear debridement  * auditory canal CT  * c/w ana cristina  * cannot do cefazolin so will have to treat with vanco  * s/p vanco yesterday and level 24, today, check another level tomorrow and will continue vanco per level  * will need a 4 week course of vanco for the MSSA bacteremia from the negative blood cx through 10/2  * monitor CBC/diff and renal function    The above assessment and plan was discussed with the primary team    Yumiko Conner MD  contact on teams  After 5pm and on weekends call [phone]

## 2023-09-12 NOTE — PROGRESS NOTE ADULT - SUBJECTIVE AND OBJECTIVE BOX
CHIEF COMPLAINT: Patient is a 73y old  Female who presents with a chief complaint of Respiratory distress (11 Sep 2023 17:08)      Interval Events:    REVIEW OF SYSTEMS:  [ ] All other systems negative  [ ] Unable to assess ROS because ________    Mode: AC/ CMV (Assist Control/ Continuous Mandatory Ventilation), RR (machine): 20, TV (machine): 300, FiO2: 30, PEEP: 8, ITime: 0.72, MAP: 14, PIP: 35      OBJECTIVE:  ICU Vital Signs Last 24 Hrs  T(C): 36.9 (12 Sep 2023 06:18), Max: 38.3 (11 Sep 2023 08:55)  T(F): 98.4 (12 Sep 2023 06:18), Max: 101 (11 Sep 2023 08:55)  HR: 98 (12 Sep 2023 07:17) (90 - 108)  BP: 117/45 (12 Sep 2023 06:18) (100/50 - 131/47)  BP(mean): --  ABP: --  ABP(mean): --  RR: 24 (12 Sep 2023 06:18) (22 - 28)  SpO2: 94% (12 Sep 2023 07:17) (93% - 98%)    O2 Parameters below as of 12 Sep 2023 06:18  Patient On (Oxygen Delivery Method): ventilator, AC  O2 Flow (L/min): 35        Mode: AC/ CMV (Assist Control/ Continuous Mandatory Ventilation), RR (machine): 20, TV (machine): 300, FiO2: 30, PEEP: 8, ITime: 0.72, MAP: 14, PIP: 35    09-11 @ 07:01  -  09-12 @ 07:00  --------------------------------------------------------  IN: 1540 mL / OUT: 175 mL / NET: 1365 mL      CAPILLARY BLOOD GLUCOSE      POCT Blood Glucose.: 144 mg/dL (12 Sep 2023 05:28)      HOSPITAL MEDICATIONS:  MEDICATIONS  (STANDING):  albuterol/ipratropium for Nebulization 3 milliLiter(s) Nebulizer every 6 hours  atorvastatin 40 milliGRAM(s) Oral at bedtime  chlorhexidine 0.12% Liquid 15 milliLiter(s) Oral Mucosa every 12 hours  chlorhexidine 2% Cloths 1 Application(s) Topical daily  ciprofloxacin/hydrocortisone Suspension Otic 4 Drop(s) Left Ear two times a day  dextrose 5%. 1000 milliLiter(s) (50 mL/Hr) IV Continuous <Continuous>  dextrose 5%. 1000 milliLiter(s) (100 mL/Hr) IV Continuous <Continuous>  dextrose 50% Injectable 12.5 Gram(s) IV Push once  dextrose 50% Injectable 25 Gram(s) IV Push once  dextrose 50% Injectable 25 Gram(s) IV Push once  doxazosin 6 milliGRAM(s) Oral <User Schedule>  ferrous    sulfate Liquid 300 milliGRAM(s) Enteral Tube daily  FIRST- Mouthwash  BLM 10 milliLiter(s) Swish and Spit four times a day  glucagon  Injectable 1 milliGRAM(s) IntraMuscular once  heparin  Infusion 1000 Unit(s)/Hr (9 mL/Hr) IV Continuous <Continuous>  insulin lispro (ADMELOG) corrective regimen sliding scale   SubCutaneous every 6 hours  insulin NPH human recombinant 3 Unit(s) SubCutaneous every 6 hours  meropenem  IVPB 500 milliGRAM(s) IV Intermittent every 12 hours  mupirocin 2% Ointment 1 Application(s) Both Nostrils two times a day  pantoprazole  Injectable 40 milliGRAM(s) IV Push two times a day  predniSONE   Tablet 10 milliGRAM(s) Oral daily  predniSONE   Tablet   Oral   sevelamer carbonate Powder 800 milliGRAM(s) Oral three times a day  sodium chloride 3%  Inhalation 4 milliLiter(s) Inhalation every 6 hours    MEDICATIONS  (PRN):  acetaminophen   Oral Liquid .. 650 milliGRAM(s) Oral every 6 hours PRN Temp greater or equal to 38C (100.4F), Mild Pain (1 - 3)  dextrose Oral Gel 15 Gram(s) Oral once PRN Blood Glucose LESS THAN 70 milliGRAM(s)/deciliter      LABS:                        7.0    9.68  )-----------( 232      ( 12 Sep 2023 05:10 )             21.5     09-12    126<L>  |  89<L>  |  75<H>  ----------------------------<  141<H>  4.4   |  23  |  2.40<H>    Ca    8.1<L>      12 Sep 2023 05:10  Phos  3.5     09-12  Mg     2.50     09-12      PT/INR - ( 11 Sep 2023 16:05 )   PT: 10.1 sec;   INR: <0.90 ratio         PTT - ( 12 Sep 2023 05:10 )  PTT:102.4 sec  Urinalysis Basic - ( 12 Sep 2023 05:10 )    Color: x / Appearance: x / SG: x / pH: x  Gluc: 141 mg/dL / Ketone: x  / Bili: x / Urobili: x   Blood: x / Protein: x / Nitrite: x   Leuk Esterase: x / RBC: x / WBC x   Sq Epi: x / Non Sq Epi: x / Bacteria: x            PAST MEDICAL & SURGICAL HISTORY:  Breast CA      Diabetes      Stroke      Cardiac arrest      HTN (hypertension)      H/O mastectomy, bilateral      FAMILY HISTORY:  No pertinent family history in first degree relatives      Social History:      RADIOLOGY:  [ ] Reviewed and interpreted by me    PULMONARY FUNCTION TESTS:    EKG: CHIEF COMPLAINT: Patient is a 73y old Female who presents with a chief complaint of Respiratory distress.      Interval Events: Febrile overnight to 100.5.       REVIEW OF SYSTEMS:  [x] Unable to assess ROS because vented.      Mode: AC/ CMV (Assist Control/ Continuous Mandatory Ventilation), RR (machine): 20, TV (machine): 300, FiO2: 30, PEEP: 8, ITime: 0.72, MAP: 14, PIP: 35      OBJECTIVE:  ICU Vital Signs Last 24 Hrs  T(C): 36.9 (12 Sep 2023 06:18), Max: 38.3 (11 Sep 2023 08:55)  T(F): 98.4 (12 Sep 2023 06:18), Max: 101 (11 Sep 2023 08:55)  HR: 98 (12 Sep 2023 07:17) (90 - 108)  BP: 117/45 (12 Sep 2023 06:18) (100/50 - 131/47)  RR: 24 (12 Sep 2023 06:18) (22 - 28)  SpO2: 94% (12 Sep 2023 07:17) (93% - 98%)      O2 Parameters below as of 12 Sep 2023 06:18  Patient On (Oxygen Delivery Method): ventilator, AC  O2 Flow (L/min): 35        Mode: AC/ CMV (Assist Control/ Continuous Mandatory Ventilation), RR (machine): 20, TV (machine): 300, FiO2: 30, PEEP: 8, ITime: 0.72, MAP: 14, PIP: 35    09-11 @ 07:01  -  09-12 @ 07:00  --------------------------------------------------------  IN: 1540 mL / OUT: 175 mL / NET: 1365 mL      CAPILLARY BLOOD GLUCOSE      POCT Blood Glucose.: 144 mg/dL (12 Sep 2023 05:28)      HOSPITAL MEDICATIONS:  MEDICATIONS  (STANDING):  albuterol/ipratropium for Nebulization 3 milliLiter(s) Nebulizer every 6 hours  atorvastatin 40 milliGRAM(s) Oral at bedtime  chlorhexidine 0.12% Liquid 15 milliLiter(s) Oral Mucosa every 12 hours  chlorhexidine 2% Cloths 1 Application(s) Topical daily  ciprofloxacin/hydrocortisone Suspension Otic 4 Drop(s) Left Ear two times a day  dextrose 5%. 1000 milliLiter(s) (50 mL/Hr) IV Continuous <Continuous>  dextrose 5%. 1000 milliLiter(s) (100 mL/Hr) IV Continuous <Continuous>  dextrose 50% Injectable 12.5 Gram(s) IV Push once  dextrose 50% Injectable 25 Gram(s) IV Push once  dextrose 50% Injectable 25 Gram(s) IV Push once  doxazosin 6 milliGRAM(s) Oral <User Schedule>  ferrous    sulfate Liquid 300 milliGRAM(s) Enteral Tube daily  FIRST- Mouthwash  BLM 10 milliLiter(s) Swish and Spit four times a day  glucagon  Injectable 1 milliGRAM(s) IntraMuscular once  heparin  Infusion 1000 Unit(s)/Hr (9 mL/Hr) IV Continuous <Continuous>  insulin lispro (ADMELOG) corrective regimen sliding scale   SubCutaneous every 6 hours  insulin NPH human recombinant 3 Unit(s) SubCutaneous every 6 hours  meropenem  IVPB 500 milliGRAM(s) IV Intermittent every 12 hours  mupirocin 2% Ointment 1 Application(s) Both Nostrils two times a day  pantoprazole  Injectable 40 milliGRAM(s) IV Push two times a day  predniSONE   Tablet 10 milliGRAM(s) Oral daily  predniSONE   Tablet   Oral   sevelamer carbonate Powder 800 milliGRAM(s) Oral three times a day  sodium chloride 3%  Inhalation 4 milliLiter(s) Inhalation every 6 hours    MEDICATIONS  (PRN):  acetaminophen   Oral Liquid .. 650 milliGRAM(s) Oral every 6 hours PRN Temp greater or equal to 38C (100.4F), Mild Pain (1 - 3)  dextrose Oral Gel 15 Gram(s) Oral once PRN Blood Glucose LESS THAN 70 milliGRAM(s)/deciliter      LABS:                        7.0    9.68  )-----------( 232      ( 12 Sep 2023 05:10 )             21.5     09-12    126<L>  |  89<L>  |  75<H>  ----------------------------<  141<H>  4.4   |  23  |  2.40<H>    Ca    8.1<L>      12 Sep 2023 05:10  Phos  3.5     09-12  Mg     2.50     09-12      PT/INR - ( 11 Sep 2023 16:05 )   PT: 10.1 sec;   INR: <0.90 ratio         PTT - ( 12 Sep 2023 05:10 )  PTT:102.4 sec  Urinalysis Basic - ( 12 Sep 2023 05:10 )    Color: x / Appearance: x / SG: x / pH: x  Gluc: 141 mg/dL / Ketone: x  / Bili: x / Urobili: x   Blood: x / Protein: x / Nitrite: x   Leuk Esterase: x / RBC: x / WBC x   Sq Epi: x / Non Sq Epi: x / Bacteria: x      PAST MEDICAL & SURGICAL HISTORY:  Breast CA      Diabetes      Stroke      Cardiac arrest      HTN (hypertension)      H/O mastectomy, bilateral      FAMILY HISTORY:  No pertinent family history in first degree relatives      Social History:      RADIOLOGY:  [ ] Reviewed and interpreted by me    PULMONARY FUNCTION TESTS:    EKG:

## 2023-09-12 NOTE — PROGRESS NOTE ADULT - SUBJECTIVE AND OBJECTIVE BOX
NEPHROLOGY     Patient seen and examined with  at bedside, trach to vent.     MEDICATIONS  (STANDING):  albuterol/ipratropium for Nebulization 3 milliLiter(s) Nebulizer every 6 hours  atorvastatin 40 milliGRAM(s) Oral at bedtime  chlorhexidine 0.12% Liquid 15 milliLiter(s) Oral Mucosa every 12 hours  chlorhexidine 2% Cloths 1 Application(s) Topical daily  ciprofloxacin/hydrocortisone Suspension Otic 4 Drop(s) Left Ear two times a day  dextrose 5%. 1000 milliLiter(s) (50 mL/Hr) IV Continuous <Continuous>  dextrose 5%. 1000 milliLiter(s) (100 mL/Hr) IV Continuous <Continuous>  dextrose 50% Injectable 12.5 Gram(s) IV Push once  dextrose 50% Injectable 25 Gram(s) IV Push once  dextrose 50% Injectable 25 Gram(s) IV Push once  doxazosin 6 milliGRAM(s) Oral <User Schedule>  ferrous    sulfate Liquid 300 milliGRAM(s) Enteral Tube daily  FIRST- Mouthwash  BLM 10 milliLiter(s) Swish and Spit four times a day  glucagon  Injectable 1 milliGRAM(s) IntraMuscular once  heparin  Infusion 1000 Unit(s)/Hr (9 mL/Hr) IV Continuous <Continuous>  insulin lispro (ADMELOG) corrective regimen sliding scale   SubCutaneous every 6 hours  insulin NPH human recombinant 3 Unit(s) SubCutaneous every 6 hours  meropenem  IVPB 500 milliGRAM(s) IV Intermittent every 12 hours  mupirocin 2% Ointment 1 Application(s) Both Nostrils two times a day  pantoprazole  Injectable 40 milliGRAM(s) IV Push two times a day  predniSONE   Tablet 10 milliGRAM(s) Oral daily  predniSONE   Tablet   Oral   sevelamer carbonate Powder 800 milliGRAM(s) Oral three times a day  sodium chloride 3%  Inhalation 4 milliLiter(s) Inhalation every 6 hours    VITALS:  T(C): , Max: 38.1 (09-11-23 @ 19:00)  T(F): , Max: 100.5 (09-11-23 @ 19:00)  HR: 95 (09-12-23 @ 09:52)  BP: 117/45 (09-12-23 @ 06:18)  RR: 24 (09-12-23 @ 06:18)  SpO2: 97% (09-12-23 @ 09:52)    I and O's:    09-11 @ 07:01  -  09-12 @ 07:00  --------------------------------------------------------  IN: 1540 mL / OUT: 175 mL / NET: 1365 mL    PHYSICAL EXAM:    Constitutional: trach to vent  HEENT: + tracheostomy +NGT  Respiratory: Coarse bs   Cardiovascular: tachy s1s2  Gastrointestinal: BS+, soft, NT/ND  Extremities: no peripheral edema  Neurological: uto   : No Wheeler  Skin: No rashes  Access: [facility] HD catheter (8/19)    LABS:                        7.0    9.68  )-----------( 232      ( 12 Sep 2023 05:10 )             21.5     09-12    126<L>  |  89<L>  |  75<H>  ----------------------------<  141<H>  4.4   |  23  |  2.40<H>    Ca    8.1<L>      12 Sep 2023 05:10  Phos  3.5     09-12  Mg     2.50     09-12    Urine Studies:  Urinalysis Basic - ( 12 Sep 2023 05:10 )    Color: x / Appearance: x / SG: x / pH: x  Gluc: 141 mg/dL / Ketone: x  / Bili: x / Urobili: x   Blood: x / Protein: x / Nitrite: x   Leuk Esterase: x / RBC: x / WBC x   Sq Epi: x / Non Sq Epi: x / Bacteria: x

## 2023-09-12 NOTE — PROGRESS NOTE ADULT - ASSESSMENT
74 y/o Female w/ DM, CVA, HTN admitted for respiratory  failure s/p tracheostomy on 9/1/2023, bacteremia. ENT consulted for L OE 9/5. Requiring serial debridements, wick removed 9/13/2023, ear debrided, removed some debri with suction this morning, able to see TM and noted watery thin discharge on the canal. External conchal bowl with slightly worsening necrotic tissue and erythema extends to the opening ear canal. Malignant otitis externa cannot be rule out based on the clinical exam finding. Discussed with primary team (NP and fellow), and ID yesterday the importance of getting CTTB to further evaluate. The importance of image study reemphasized again this morning to primary team (PA).     Culture from 9/6/2023 with no growth. Patient was febrile to 100.5 overnight, fever workup was sent on 9/10 with Tmax 102F. Blood culture no growth to date, UA negative, Sputum culture with rare yeast like cell and GPC. WBC 9.68, H/H slightly dropped to 7/21.5. Patient started on heparin drip for acute left lower popliteal DVT on 9/11/2023 by primary team. + MRSA on mupirocin. Patient is currently on meropenem per ID.

## 2023-09-12 NOTE — PROGRESS NOTE ADULT - SUBJECTIVE AND OBJECTIVE BOX
S: Patient seen and examined. No overnight events.  at bedside. On hep gtt      Medications: MEDICATIONS  (STANDING):  albuterol/ipratropium for Nebulization 3 milliLiter(s) Nebulizer every 6 hours  atorvastatin 40 milliGRAM(s) Oral at bedtime  chlorhexidine 0.12% Liquid 15 milliLiter(s) Oral Mucosa every 12 hours  chlorhexidine 2% Cloths 1 Application(s) Topical daily  ciprofloxacin/hydrocortisone Suspension Otic 4 Drop(s) Left Ear two times a day  dextrose 5%. 1000 milliLiter(s) (50 mL/Hr) IV Continuous <Continuous>  dextrose 5%. 1000 milliLiter(s) (100 mL/Hr) IV Continuous <Continuous>  dextrose 50% Injectable 12.5 Gram(s) IV Push once  dextrose 50% Injectable 25 Gram(s) IV Push once  dextrose 50% Injectable 25 Gram(s) IV Push once  doxazosin 6 milliGRAM(s) Oral <User Schedule>  ferrous    sulfate Liquid 300 milliGRAM(s) Enteral Tube daily  FIRST- Mouthwash  BLM 10 milliLiter(s) Swish and Spit four times a day  glucagon  Injectable 1 milliGRAM(s) IntraMuscular once  heparin  Infusion 1000 Unit(s)/Hr (8.5 mL/Hr) IV Continuous <Continuous>  insulin lispro (ADMELOG) corrective regimen sliding scale   SubCutaneous every 6 hours  insulin NPH human recombinant 3 Unit(s) SubCutaneous every 6 hours  meropenem  IVPB 500 milliGRAM(s) IV Intermittent every 12 hours  mupirocin 2% Ointment 1 Application(s) Both Nostrils two times a day  pantoprazole  Injectable 40 milliGRAM(s) IV Push two times a day  predniSONE   Tablet 10 milliGRAM(s) Oral daily  predniSONE   Tablet   Oral   psyllium Powder 1 Packet(s) Oral daily  sevelamer carbonate Powder 800 milliGRAM(s) Oral three times a day  sodium chloride 3%  Inhalation 4 milliLiter(s) Inhalation every 6 hours    MEDICATIONS  (PRN):  acetaminophen   Oral Liquid .. 650 milliGRAM(s) Oral every 6 hours PRN Temp greater or equal to 38C (100.4F), Mild Pain (1 - 3)  dextrose Oral Gel 15 Gram(s) Oral once PRN Blood Glucose LESS THAN 70 milliGRAM(s)/deciliter       Vitals:  Vital Signs Last 24 Hrs  T(C): 37.4 (12 Sep 2023 17:36), Max: 38 (11 Sep 2023 21:51)  T(F): 99.3 (12 Sep 2023 17:36), Max: 100.4 (11 Sep 2023 21:51)  HR: 105 (12 Sep 2023 17:36) (90 - 113)  BP: 118/60 (12 Sep 2023 17:36) (100/41 - 124/63)  BP(mean): --  RR: 26 (12 Sep 2023 17:36) (20 - 28)  SpO2: 95% (12 Sep 2023 17:36) (94% - 100%)    Parameters below as of 12 Sep 2023 17:36  Patient On (Oxygen Delivery Method): ventilator, ac  O2 Flow (L/min): 35            Neurological Exam:  Mental Status: Eyes open, looking around, follows simple commands. + trach and NGT   Cranial Nerves: PERRL slightly sluggish, Orthophoric gaze, decreased BTT on the L?  Motor: Moves upper > lower extremities spontaneously with less movement in RUE  Sensation: WD to noxious x4  Reflexes: 1+ throughout at biceps, brachioradialis, triceps, patellars and ankles bilaterally and equal. No clonus.     I personally reviewed the below data/images/labs:         LABS:                          6.9    8.51  )-----------( 207      ( 12 Sep 2023 13:00 )             21.8     09-12    126<L>  |  89<L>  |  75<H>  ----------------------------<  141<H>  4.4   |  23  |  2.40<H>    Ca    8.1<L>      12 Sep 2023 05:10  Phos  3.5     09-12  Mg     2.50     09-12        PT/INR - ( 11 Sep 2023 16:05 )   PT: 10.1 sec;   INR: <0.90 ratio         PTT - ( 12 Sep 2023 19:20 )  PTT:56.3 sec  Urinalysis Basic - ( 12 Sep 2023 05:10 )    Color: x / Appearance: x / SG: x / pH: x  Gluc: 141 mg/dL / Ketone: x  / Bili: x / Urobili: x   Blood: x / Protein: x / Nitrite: x   Leuk Esterase: x / RBC: x / WBC x   Sq Epi: x / Non Sq Epi: x / Bacteria: x          < from: CT Head No Cont (08.15.23 @ 17:20) >    ACC: [id number] EXAM:  CT BRAIN   ORDERED BY: MIANL SAM     PROCEDURE DATE:  08/15/2023          INTERPRETATION:  Clinical indication: Change in neuro exam.    Multiple axial sections were performed from base to vertex without   contrast enhancement. Coronal and sagittal reconstructions were   reformatted well.    This exam is compared prior head CT performed on August 11, 2023    Parenchymal volume loss and chronic microvessel ischemic changes are   again seen.    Abnormal low-attenuation involving the left occipital cortical   subcortical region is again seen. 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    No evidence of acute hemorrhage mass or mass effect is seen.    Evaluation of the osseous structures with appropriate window demonstrates   sclerotic changes about the left mastoid region which appears stable.   Opacification left middle ear region is again seen.    Patient is status post bilateral cataract surgery.    Impression: Stable exam.    < end of copied text >    vEEG:    Clinical Impression:  - Severe diffuse non-specific cerebral dysfunction  - There were no epileptiform abnormalities or seizures recorded.        CTH 8/11:    VENTRICLES AND SULCI: Age-appropriate involutional change  INTRA-AXIAL:  Old left PCA infarct as seen on the prior unchanged.   Microvascular ischemic changes involving the periventricular and   subcortical white matter as seen previously  EXTRA-AXIAL:  No mass or collection is seen.  VISUALIZED SINUSES:  Clear.  VISUALIZED MASTOIDS: Left mastoid sclerosis  CALVARIUM: Infiltrative appearance tothe calvarium may be indicative of   marrow infiltration on the basis of patient's known diagnosis of breast   cancer. MISCELLANEOUS:  None.    IMPRESSION:  No significant interval change compared with 7/17/2023 in   left PCA infarct which is old. Microvascular ischemic changes involving   the periventricular and subcortical white matter as seen   previously.Questionable lesions at the level of the calvarium related to   possible breast CA. Clinical correlation recommended.    --- End of Report ---

## 2023-09-12 NOTE — PROGRESS NOTE ADULT - NS ATTEND AMEND GEN_ALL_CORE FT
Pt is a 73F with PMHx IDDMII, CKD, hx CVA, CHFpEF, latent TB (s/p tx,) hx breast cancer (2018, s/p mastectomy and adjuvant CT/RT), and hx CVA s/p trach/PEG (now decannulated) initially presenting to [facility] on 8/11/23 with acute hypoxemic and hypercapnic respiratory failure s/p ETT intubation/MV and ARF with pulmonary edema c/b HTN emergency requiring nicardipine drip transferred to MICU for further management.     Pt initially p/w worsening RUE shaking and dysconjugate gaze with MRI 8/17 (+) new right cerebellar, midbrain, and multiple tiny embolic infarcts as well as known left PCA CVA. EEG (-). 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 ILR in place from prior CVA evaluation, last interrogated 9/7 without AF. At baseline, pt reportedly wheelchair bound with RUE tremors and some ADL assistance. Pt was unable to tolerate ASA 2/2 GIB.     Pt with acute hypoxemic and hypercapnic respiratory failure s/p ETT intubation/MV possibly 2/2 flash pulmonary edema in the setting of HTN emergency +/- aspiration event. Pt with failure of ventilatory weaning now s/p tracheostomy placement (IP 9/1). Of note, pt also noted to have evidence of midbrain CVA with possible concern for central effect on respiratory drive, will monitor carefully and continue weaning trials as tolerated. Airway clearance therapy in place. Wean O2 supplementation for goal O2 saturation 90-95%. Oral hygiene and aspiration precautions in place.      Pt p/w ARF on CKD requiring initiation of HD 2/2 pulmonary edema and metabolic acidosis with metabolic encephalopathy. No urgent indication for HD today, further HD as per nephrology team. On prednisone taper for possible AIN. Strict I/Os, pt makes small amount of urine. Can attempt diuretic challenge.     Hospital course further c/b recurrent infections, most recently with MSSA bacteremia (9/1, cleared 9/4 and 9/8) with (+) MSSA and ESBL EColi in BAL as well. Pt with possible cefepime vs. cefazolin drug-induced rash followed by transition to vancomycin. Was then found to have left otitis externa (9/5) now requiring serial bedside debridement with worsening necrotic tissue/erythema for which she was also initiated on meropenem. CTTB pending, family refusing contrast. Will proceed with noncontrast study for now. Cultures NTD, pt remains febrile intermittently. Will continue with meropenem and Ciprodex otic drops x10 day course for now as well as continuation of vancomycin by level x4 week course through 10/2 (unable to exclude endocarditis).      Pt also with oropharyngeal dysphagia requiring NGTs followed by UGIB s/p EGD (8/31) found to have esophagitis/erosive gastropathy now on PPI BID. Pt further found to have popliteal DVT. No c/i by GI for A/C, pt initiated on heparin drip with patient-specific pTTs. Will need to ultimately transition to Coumadin x3-6 months.  Dispo pending medical optimization. Pt full code. DVT ppx full A/C with heparin drip. Discussed with pt's family ( and daughter) at bedside daily.

## 2023-09-12 NOTE — PROGRESS NOTE ADULT - SUBJECTIVE AND OBJECTIVE BOX
Follow Up:  MSSA bacteremia    Interval History: was febrile yesterday, no fever today yet, was started on heparin for DVT, plan for auditory canal CT    ROS:    Unobtainable because: trached      Allergies  isoniazid (Rash)  nafcillin (Unknown)  hydrALAZINE (Rash)  vitamin E (Short breath; Urticaria; Hives)  doxycycline (Rash)  cefepime (Rash)  NIFEdipine (Urticaria; Hives)        ANTIMICROBIALS:  meropenem  IVPB 500 every 12 hours      OTHER MEDS:  acetaminophen   Oral Liquid .. 650 milliGRAM(s) Oral every 6 hours PRN  albuterol/ipratropium for Nebulization 3 milliLiter(s) Nebulizer every 6 hours  atorvastatin 40 milliGRAM(s) Oral at bedtime  chlorhexidine 0.12% Liquid 15 milliLiter(s) Oral Mucosa every 12 hours  chlorhexidine 2% Cloths 1 Application(s) Topical daily  ciprofloxacin/hydrocortisone Suspension Otic 4 Drop(s) Left Ear two times a day  dextrose 5%. 1000 milliLiter(s) IV Continuous <Continuous>  dextrose 5%. 1000 milliLiter(s) IV Continuous <Continuous>  dextrose 50% Injectable 12.5 Gram(s) IV Push once  dextrose 50% Injectable 25 Gram(s) IV Push once  dextrose 50% Injectable 25 Gram(s) IV Push once  dextrose Oral Gel 15 Gram(s) Oral once PRN  doxazosin 6 milliGRAM(s) Oral <User Schedule>  ferrous    sulfate Liquid 300 milliGRAM(s) Enteral Tube daily  FIRST- Mouthwash  BLM 10 milliLiter(s) Swish and Spit four times a day  glucagon  Injectable 1 milliGRAM(s) IntraMuscular once  heparin  Infusion 1000 Unit(s)/Hr IV Continuous <Continuous>  insulin lispro (ADMELOG) corrective regimen sliding scale   SubCutaneous every 6 hours  insulin NPH human recombinant 3 Unit(s) SubCutaneous every 6 hours  mupirocin 2% Ointment 1 Application(s) Both Nostrils two times a day  pantoprazole  Injectable 40 milliGRAM(s) IV Push two times a day  predniSONE   Tablet 10 milliGRAM(s) Oral daily  predniSONE   Tablet   Oral   sevelamer carbonate Powder 800 milliGRAM(s) Oral three times a day  sodium chloride 3%  Inhalation 4 milliLiter(s) Inhalation every 6 hours      Vital Signs Last 24 Hrs  T(C): 36.9 (12 Sep 2023 06:18), Max: 38.1 (11 Sep 2023 19:00)  T(F): 98.4 (12 Sep 2023 06:18), Max: 100.5 (11 Sep 2023 19:00)  HR: 109 (12 Sep 2023 11:45) (90 - 109)  BP: 117/45 (12 Sep 2023 06:18) (100/50 - 131/47)  BP(mean): --  RR: 24 (12 Sep 2023 06:18) (22 - 28)  SpO2: 97% (12 Sep 2023 11:45) (93% - 97%)    Parameters below as of 12 Sep 2023 09:52  Patient On (Oxygen Delivery Method): ventilator        Physical Exam:  General:    trached shaking her head  ENT: L ear edema, black discoloration and bullae formation  Respiratory:   trach on vent  abd:   soft, not tender  :     no CVAT, no suprapubic tenderness, no willard  Musculoskeletal : no joint swelling  Skin:    no rash now  vascular: [facility] ilene                        7.0    9.68  )-----------( 232      ( 12 Sep 2023 05:10 )             21.5       09-12    126<L>  |  89<L>  |  75<H>  ----------------------------<  141<H>  4.4   |  23  |  2.40<H>    Ca    8.1<L>      12 Sep 2023 05:10  Phos  3.5     09-12  Mg     2.50     09-12        Urinalysis Basic - ( 12 Sep 2023 05:10 )    Color: x / Appearance: x / SG: x / pH: x  Gluc: 141 mg/dL / Ketone: x  / Bili: x / Urobili: x   Blood: x / Protein: x / Nitrite: x   Leuk Esterase: x / RBC: x / WBC x   Sq Epi: x / Non Sq Epi: x / Bacteria: x        MICROBIOLOGY:  Vancomycin Level, Trough: 24.2 ug/mL (09-12-23 @ 05:10)  v  .Blood Blood-Venous  09-10-23   No growth at 24 hours  --  --      .Sputum Sputum  09-10-23   Normal Respiratory Cate present  --    Rare polymorphonuclear leukocytes per low power field  No Squamous epithelial cells per low power field  Rare Yeast like cells seen per oil power field  Rare Gram Positive Cocci in Clusters seen per oil power field      .Blood Blood-Peripheral  09-10-23   No growth at 48 Hours  --  --      .Sputum Sputum  09-08-23   Normal Respiratory Cate present  --    Numerous polymorphonuclear leukocytes per low power field  Numerous Squamous epithelial cells per low power field  Few Yeast like cells per oil power field  Results consistent with oropharyngeal contamination      .Blood Blood-Peripheral  09-08-23   No growth at 4 days  --  --      Ear Ear  09-06-23   No growth at 5 days  --  --      .Blood Blood-Peripheral  09-04-23   No growth at 5 days  --  --      .Bronchial Bronchial Lavage  09-01-23   Numerous Staphylococcus aureus Multiple Morphological Strains  Normal Respiratory Cate present  --  Staphylococcus aureus  Staphylococcus aureus      .Blood Blood-Peripheral  09-01-23   Growth in aerobic and anaerobic bottles: Staphylococcus aureus  Direct identification is available within approximately 3-5  hours either by Blood Panel Multiplexed PCR or Direct  MALDI-TOF. Details: [email or web address]  Growth in anaerobic bottle: blood culture bottle turned positive after  the final report was released.  --  Blood Culture PCR  Staphylococcus aureus      ET Tube ET Tube  09-01-23   Moderate Staphylococcus aureus  Moderate Escherichia coli ESBL  Normal Respiratory Cate present  --  Staphylococcus aureus  Escherichia coli ESBL      .Blood Blood-Peripheral  08-16-23   No growth at 5 days  --  --      .Blood Blood-Peripheral  08-16-23   No growth at 5 days  --  --      .Sputum Sputum  08-16-23   Normal Respiratory Cate present  --    Few polymorphonuclear leukocytes per low power field  Rare Squamous epithelial cells per low power field  Few Gram Positive Cocci in Clusters per oil power field          Rapid RVP Result: NotDetec (09-08 @ 11:38)        RADIOLOGY:  Images independently visualized and reviewed personally, findings as below  < from: US Duplex Venous Lower Ext Complete, Bilateral (09.10.23 @ 17:56) >  IMPRESSION:  Acute left deep vein thrombosis of the left popliteal vein.    < end of copied text >  < from: CT Chest No Cont (09.08.23 @ 21:27) >  IMPRESSION:    Endotracheal tube tip above the ines. Below the endotracheal tube tip,   the lower trachea is partially collapsed with a crescentic configuration   suggestive of tracheomalacia. More inferiorly there is severe stenosis of   the trachea measuring 5.6 x 4.1 mm.    The left upper lobe bronchus is obliterated, new from prior exam. There   is paramediastinal and perihilar consolidation of left upper lobe,   increased from prior exam. Tree-in-bud opacities throughout the left lung   are increased.    Cavitary consolidation of right upper lobe is not significantly changed.   Tree-in-bud opacities within the right lung are increased.    Large right pleural effusion and small left pleural effusion are   increased.    < end of copied text >  < from: Transthoracic Echocardiogram (09.06.23 @ 14:05) >  CONCLUSIONS:  Limited study to evaluate for endocarditis.  Unable to  exclude endocarditis.  Consider STEPHANIE for further evaluation,  if clinically indicated.    < end of copied text >

## 2023-09-12 NOTE — PROGRESS NOTE ADULT - PROBLEM SELECTOR PLAN 1
- Please do not keep dry gauze on pinna, if pus pouring out from ear canal, please send for culture  - FSG control - FSG goal <150  - Continue with  broad spectrum antibiotics- now on meropenem , ciprodex x 10d  - ID recommendation appreciated  - Please get CTTB to better assess and ensure no bony destruction/ involvement  - Case discussed with Dr. Feng  - Dr. Feng will see patient today - Please do not keep dry gauze on pinna, if pus pouring out from ear canal, please send for culture  - Please keep pressure off from left ear at all times  - FSG control - FSG goal <150  - Continue with  broad spectrum antibiotics- now on meropenem , ciprodex x 10d  - ID recommendation appreciated  - Please get CTTB to better assess and ensure no bony destruction/ involvement  - Case discussed with Dr. Feng  - Dr. Feng will see patient today

## 2023-09-12 NOTE — PROGRESS NOTE ADULT - ASSESSMENT
IMPRESSION: 73F w/ HTN, DM2, CVA, breast CA-bilateral mastectomy, recurrent aspiration pneumonia/respiratory failure, and CKD, 8/11/23 p/w acute hypercapnic respiratory failure; c/b RUSS    (1)CKD - stage 4     (2)RUSS - ATN vs AIN -BUN and creatinine elevated w/ electrolyte derangements, (hyponatremia, hyperkalemia, hyperphosphatemia). Improving on HD    (3)Hyponatremia - NA+ declining     (4)Hypocalcemia corrects to ~9.3 for hypalbuminemia     (5)Pulm- hypercapnic respiratory failure on admission - now s/p trach; remains on vent     (6)CV - normotensive     (7)ID - on IV Meropenem/Vanco. BCx w/ NGTD   IMPRESSION: 73F w/ HTN, DM2, CVA, breast CA-bilateral mastectomy, recurrent aspiration pneumonia/respiratory failure, and CKD, 8/11/23 p/w acute hypercapnic respiratory failure; c/b RUSS    (1)CKD - stage 4     (2)RUSS - ATN vs AIN -BUN and creatinine elevated w/ electrolyte derangements, (hyponatremia, hyperkalemia, hyperphosphatemia). Improving on HD    (3)Hyponatremia - NA+ declining     (4)Hypocalcemia corrects to ~9.5 for hypalbuminemia     (5)Pulm- hypercapnic respiratory failure on admission - now s/p trach; remains on vent     (6)CV - normotensive     (7)ID - on IV Meropenem/Vanco. BCx w/ NGTD   IMPRESSION: 73F w/ HTN, DM2, CVA, breast CA-bilateral mastectomy, recurrent aspiration pneumonia/respiratory failure, and CKD, 8/11/23 p/w acute hypercapnic respiratory failure; c/b RUSS    (1)CKD - stage 4     (2)RUSS - ATN vs AIN -BUN and creatinine elevated w/ electrolyte derangements, (hyponatremia, hyperkalemia, hyperphosphatemia). Improving on HD    (3)Hyponatremia - NA+ declining     (4)Hypocalcemia corrects to ~9.5 for hypalbuminemia     (5)Pulm- hypercapnic respiratory failure on admission - now s/p trach; remains on vent     (6)CV - normotensive     (7)ID - on IV Meropenem/Vanco. BCx w/ NGTD    RECOMMEND:  (1)No HD today   (2)No objection to diuretics as needed   (3)Prednisone taper now 10mg po qd x3 days  (4)Continue Renvela 800 mg tid for now  (5)Dose new meds for GFR <10/HD    Nilda Espinoza DNP  [facility]  [phone]      IMPRESSION: 73F w/ HTN, DM2, CVA, breast CA-bilateral mastectomy, recurrent aspiration pneumonia/respiratory failure, and CKD, 8/11/23 p/w acute hypercapnic respiratory failure; c/b RUSS    (1)CKD - stage 4     (2)RUSS - ATN vs AIN -BUN and creatinine elevated w/ electrolyte derangements, (hyponatremia, hyperkalemia, hyperphosphatemia). Improving on HD    (3)Hyponatremia - NA+ declining     (4)Hypocalcemia corrects to ~9.5 for hypalbuminemia     (5)Pulm- hypercapnic respiratory failure on admission - now s/p trach; remains on vent     (6)CV - normotensive     (7)ID - on IV Meropenem/Vanco. BCx w/ NGTD    RECOMMEND:  (1)No HD today   (2)Would forgo CT with contrast  (3)No objection to diuretics as needed   (4)Prednisone taper now 10mg po qd x3 days  (5)Continue Renvela 800 mg tid for now  (6)Dose new meds for GFR <10/HD    Nilda Espinoza DNP  [facility]  [phone]      IMPRESSION: 73F w/ HTN, DM2, CVA, breast CA-bilateral mastectomy, recurrent aspiration pneumonia/respiratory failure, and CKD, 8/11/23 p/w acute hypercapnic respiratory failure; c/b RUSS    (1)CKD - stage 4     (2)RUSS - ATN vs AIN -BUN and creatinine elevated w/ electrolyte derangements, (hyponatremia, hyperkalemia, hyperphosphatemia). Improving on HD    (3)Hyponatremia - NA+ declining     (4)Hypocalcemia corrects to ~9.5 for hypalbuminemia     (5)Pulm- hypercapnic respiratory failure on admission - now s/p trach; remains on vent     (6)CV - normotensive     (7)ID - on IV Meropenem/Vanco. BCx w/ NGTD    RECOMMEND:  (1)No HD today   (2)Would forgo CT with contrast  (3)No objection to diuretics as needed   (4)Prednisone taper now 10mg po qd x3 days  (5)Continue Renvela 800 mg tid for now  (6)Dose new meds for GFR <10/HD    Nilda Espinoza DNP  [facility]  [phone]       RENAL ATTENDING NOTE  Patient seen and examined with NP. Agree with assessment and plan as above.    Discussed with primary team and with family regarding risk/benefit of IV contrast study. Whereas I believe the patient's prognosis is exceedingly poor, and that she will not be able to achieve an extended lifespan off hemodialysis, I cannot say for certain that she will not be able to be weaned off dialysis. The family considers it a primary objective to be able to have her leave the hospital off dialysis; administration of the IV contrast could lessen the (already very low) likelihood of achieving this. Therefore, unless there is it absolutely necessary to move forward with the IV contrast study, I would do all in our power to avoid it.    Jimmy Colon MD  [facility]  [phone]

## 2023-09-13 NOTE — PROGRESS NOTE ADULT - SUBJECTIVE AND OBJECTIVE BOX
ENT ISSUE/POD: left otitis externa, left otomastoiditis    HPI: patient seen and examined at bedside with  this morning. Patient is unable to talk while on ventilator. Constantly shaking her head, per , she is c/o pain. Continues spikes fever witj Tmax 105 overnight on meropenem per ID. H/H dropped further 6.4/19.8 s/p 1 PRBC at 4am today.         PAST MEDICAL & SURGICAL HISTORY:  Breast CA      Diabetes      Stroke      Cardiac arrest      HTN (hypertension)      H/O mastectomy, bilateral        Allergies    isoniazid (Rash)  nafcillin (Unknown)  hydrALAZINE (Rash)  vitamin E (Short breath; Urticaria; Hives)  doxycycline (Rash)  cefepime (Rash)  NIFEdipine (Urticaria; Hives)    Intolerances      MEDICATIONS  (STANDING):  albuterol/ipratropium for Nebulization 3 milliLiter(s) Nebulizer every 6 hours  atorvastatin 40 milliGRAM(s) Oral at bedtime  chlorhexidine 0.12% Liquid 15 milliLiter(s) Oral Mucosa every 12 hours  chlorhexidine 2% Cloths 1 Application(s) Topical daily  ciprofloxacin/hydrocortisone Suspension Otic 4 Drop(s) Left Ear two times a day  dextrose 5%. 1000 milliLiter(s) (50 mL/Hr) IV Continuous <Continuous>  dextrose 5%. 1000 milliLiter(s) (100 mL/Hr) IV Continuous <Continuous>  dextrose 50% Injectable 12.5 Gram(s) IV Push once  dextrose 50% Injectable 25 Gram(s) IV Push once  dextrose 50% Injectable 25 Gram(s) IV Push once  doxazosin 6 milliGRAM(s) Oral <User Schedule>  ferrous    sulfate Liquid 300 milliGRAM(s) Enteral Tube daily  FIRST- Mouthwash  BLM 10 milliLiter(s) Swish and Spit four times a day  glucagon  Injectable 1 milliGRAM(s) IntraMuscular once  heparin  Infusion 1000 Unit(s)/Hr (8.5 mL/Hr) IV Continuous <Continuous>  insulin lispro (ADMELOG) corrective regimen sliding scale   SubCutaneous every 6 hours  insulin NPH human recombinant 3 Unit(s) SubCutaneous every 6 hours  meropenem  IVPB 500 milliGRAM(s) IV Intermittent every 12 hours  mupirocin 2% Ointment 1 Application(s) Both Nostrils two times a day  pantoprazole  Injectable 40 milliGRAM(s) IV Push two times a day  psyllium Powder 1 Packet(s) Oral daily  sevelamer carbonate Powder 800 milliGRAM(s) Oral three times a day  sodium chloride 3%  Inhalation 4 milliLiter(s) Inhalation every 6 hours    MEDICATIONS  (PRN):  acetaminophen   Oral Liquid .. 650 milliGRAM(s) Oral every 6 hours PRN Temp greater or equal to 38C (100.4F), Mild Pain (1 - 3)  dextrose Oral Gel 15 Gram(s) Oral once PRN Blood Glucose LESS THAN 70 milliGRAM(s)/deciliter      Social History: see consult note    Family history: see consult note    ROS: unable to answer      Vital Signs Last 24 Hrs  T(C): 37.1 (13 Sep 2023 08:07), Max: 37.5 (12 Sep 2023 11:58)  T(F): 98.8 (13 Sep 2023 08:07), Max: 99.5 (12 Sep 2023 11:58)  HR: 101 (13 Sep 2023 08:07) (92 - 113)  BP: 119/43 (13 Sep 2023 08:07) (100/41 - 125/44)  BP(mean): --  RR: 20 (13 Sep 2023 08:07) (20 - 26)  SpO2: 96% (13 Sep 2023 08:07) (95% - 100%)    Parameters below as of 13 Sep 2023 08:07  Patient On (Oxygen Delivery Method): ventilator, AC  O2 Flow (L/min): 35                            6.4    7.01  )-----------( 203      ( 13 Sep 2023 02:00 )             19.8    09-13    124<L>  |  88<L>  |  84<H>  ----------------------------<  151<H>  4.4   |  23  |  2.54<H>    Ca    8.0<L>      13 Sep 2023 02:00  Phos  3.7     09-13  Mg     2.40     09-13     PT/INR - ( 11 Sep 2023 16:05 )   PT: 10.1 sec;   INR: <0.90 ratio         PTT - ( 13 Sep 2023 02:00 )  PTT:76.3 sec    PHYSICAL EXAM:  Gen: NAD  Skin: No rashes, bruises, or lesions  Head: Normocephalic, Atraumatic  Face: no edema, erythema, or fluctuance. Parotid glands soft without mass  Eyes: no scleral injection  Ears: Left - conchal bowl debrided at bedside, black tissue and scabes removed, there is healthy pink tissue underneath, ear canal with thin clear discharge removed via suction, TM intact but thickened. No mastoid tenderness, erythema, or ear bulging  Nose: Nares bilaterally patent, no discharge  Mouth: No Stridor / Drooling / Trismus.  Mucosa moist, tongue/uvula midline, oropharynx clear  Neck: + tracheostomy tube in the neck secured with posey. Flat, supple, no lymphadenopathy, trachea midline, no masses  Lymphatic: No lymphadenopathy  Resp: on full ventilator support breathing easily, no stridor  Neuro: facial nerve intact, no facial droop      Image    < from: CT Internal Auditory Canals No Cont (09.12.23 @ 17:13) >    ACC: [id number] EXAM:  CT IAC   ORDERED BY: JORGE DWYER     PROCEDURE DATE:  09/12/2023          INTERPRETATION:  .    CLINICAL INFORMATION: Evaluate for malignant otitis externa    COMPARISON: Prior head CT exam from 9/8/2023. Prior brain MRI study dated   8/17/2023. No prior temporal bone CT exams are available for comparison.    TECHNIQUE: Non contrast high resolution CT imaging of the temporal bones   was performed. Sagittal and coronal reformatted images were obtained from   the source data and also reviewed.    FINDINGS:    On the right, the ear and periauricular soft tissues appear unremarkable.   The external auditory canal is normal in appearance. The tympanic   membrane appears unremarkable. The middle ear cavity is well-aerated. The   ossicular chain is intact. The otic capsule appears within normal limits.   The inner ear structures are normally formed. The cochlea, vestibule, and   semicircular canals appear unremarkable. The internal auditory canal is   normal in size. Thefacial nerve has a normal course. Nonspecific soft   tissue and/or debris and/or fluid opacifies the mastoid antrum as well as   the mastoid air cells. Sclerosis is seen involving the right mastoid   process and tip. There is no mastoid destruction. The vestibular aqueduct   appears unremarkable. The bony coverings of the vascular structures are   intact.    On the left, soft tissue thickening is seen involving the pinna with   calcifications of the cartilage. There is also soft tissue thickening,   debris and/or fluid involving the left external auditory canal. No gross   bony erosive change is seen involving the bony external auditory canal.   The underlying left parotid gland appears unremarkable. The tympanic   membrane is calcified and thickened. The ossicular chain appears abnormal   with areas of demineralization as well as areas of multiple calcific   deposits. The ossicular chain is completely surrounded by abnormal soft   tissue which fills the middle ear cavity. The tegmen is intact. Abnormal   soft tissue and/or debris and/or fluid continues into the mastoid antrum   and cavity which appear underpneumatized and sclerotic. This continues to   the mastoid process and tip. The internal auditory canal is normal in   caliber. The facial nerve has a normal course. The otic capsule appears   intact. The inner ear structures are normally formed. The cochlea,   vestibule, and semicircular canals appear unremarkable. The vestibular   aqueduct appears within normal limits. The bony covering of the vascular   structures are intact.    Chronic infarction involving the left paramedian occipital lobe is seen.   Microvascular type changes are again notable. There is mild   ventriculomegaly appears unchanged.    Scattered mucosal thickening and/or debris is seen throughout the   paranasal sinuses.    A left-sided NG tube in place. Aerated secretions and/or debris and/or   fluid are seen within the posterior nasal cavity layering into the   nasopharynx.    The patient is status post tracheostomy.    IMPRESSION:    1. Acute on chronic left-sided otitis externa and acute on chronic   left-sided otomastoiditis. 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 Superimposed cholesteatoma formation within the left   tympanomastoid cavity cannot be excluded. No evidence of malignant otitis   externa.    2. Chronic right-sided mastoiditis.    --- End of Report ---    < end of copied text >   ENT ISSUE/POD: left otitis externa, left otomastoiditis    HPI: patient seen and examined at bedside with  this morning. Patient is unable to talk while on ventilator. Constantly shaking her head, per , she is c/o pain. Patient on meropenem per ID. H/H dropped further 6.4/19.8 s/p 1 PRBC at 4am today.         PAST MEDICAL & SURGICAL HISTORY:  Breast CA      Diabetes      Stroke      Cardiac arrest      HTN (hypertension)      H/O mastectomy, bilateral        Allergies    isoniazid (Rash)  nafcillin (Unknown)  hydrALAZINE (Rash)  vitamin E (Short breath; Urticaria; Hives)  doxycycline (Rash)  cefepime (Rash)  NIFEdipine (Urticaria; Hives)    Intolerances      MEDICATIONS  (STANDING):  albuterol/ipratropium for Nebulization 3 milliLiter(s) Nebulizer every 6 hours  atorvastatin 40 milliGRAM(s) Oral at bedtime  chlorhexidine 0.12% Liquid 15 milliLiter(s) Oral Mucosa every 12 hours  chlorhexidine 2% Cloths 1 Application(s) Topical daily  ciprofloxacin/hydrocortisone Suspension Otic 4 Drop(s) Left Ear two times a day  dextrose 5%. 1000 milliLiter(s) (50 mL/Hr) IV Continuous <Continuous>  dextrose 5%. 1000 milliLiter(s) (100 mL/Hr) IV Continuous <Continuous>  dextrose 50% Injectable 12.5 Gram(s) IV Push once  dextrose 50% Injectable 25 Gram(s) IV Push once  dextrose 50% Injectable 25 Gram(s) IV Push once  doxazosin 6 milliGRAM(s) Oral <User Schedule>  ferrous    sulfate Liquid 300 milliGRAM(s) Enteral Tube daily  FIRST- Mouthwash  BLM 10 milliLiter(s) Swish and Spit four times a day  glucagon  Injectable 1 milliGRAM(s) IntraMuscular once  heparin  Infusion 1000 Unit(s)/Hr (8.5 mL/Hr) IV Continuous <Continuous>  insulin lispro (ADMELOG) corrective regimen sliding scale   SubCutaneous every 6 hours  insulin NPH human recombinant 3 Unit(s) SubCutaneous every 6 hours  meropenem  IVPB 500 milliGRAM(s) IV Intermittent every 12 hours  mupirocin 2% Ointment 1 Application(s) Both Nostrils two times a day  pantoprazole  Injectable 40 milliGRAM(s) IV Push two times a day  psyllium Powder 1 Packet(s) Oral daily  sevelamer carbonate Powder 800 milliGRAM(s) Oral three times a day  sodium chloride 3%  Inhalation 4 milliLiter(s) Inhalation every 6 hours    MEDICATIONS  (PRN):  acetaminophen   Oral Liquid .. 650 milliGRAM(s) Oral every 6 hours PRN Temp greater or equal to 38C (100.4F), Mild Pain (1 - 3)  dextrose Oral Gel 15 Gram(s) Oral once PRN Blood Glucose LESS THAN 70 milliGRAM(s)/deciliter      Social History: see consult note    Family history: see consult note    ROS: unable to answer      Vital Signs Last 24 Hrs  T(C): 37.1 (13 Sep 2023 08:07), Max: 37.5 (12 Sep 2023 11:58)  T(F): 98.8 (13 Sep 2023 08:07), Max: 99.5 (12 Sep 2023 11:58)  HR: 101 (13 Sep 2023 08:07) (92 - 113)  BP: 119/43 (13 Sep 2023 08:07) (100/41 - 125/44)  BP(mean): --  RR: 20 (13 Sep 2023 08:07) (20 - 26)  SpO2: 96% (13 Sep 2023 08:07) (95% - 100%)    Parameters below as of 13 Sep 2023 08:07  Patient On (Oxygen Delivery Method): ventilator, AC  O2 Flow (L/min): 35                            6.4    7.01  )-----------( 203      ( 13 Sep 2023 02:00 )             19.8    09-13    124<L>  |  88<L>  |  84<H>  ----------------------------<  151<H>  4.4   |  23  |  2.54<H>    Ca    8.0<L>      13 Sep 2023 02:00  Phos  3.7     09-13  Mg     2.40     09-13     PT/INR - ( 11 Sep 2023 16:05 )   PT: 10.1 sec;   INR: <0.90 ratio         PTT - ( 13 Sep 2023 02:00 )  PTT:76.3 sec    PHYSICAL EXAM:  Gen: NAD  Skin: No rashes, bruises, or lesions  Head: Normocephalic, Atraumatic  Face: no edema, erythema, or fluctuance. Parotid glands soft without mass  Eyes: no scleral injection  Ears: Left - conchal bowl debrided at bedside, black tissue and scabes removed, there is healthy pink tissue underneath, ear canal with thin clear discharge removed via suction, TM intact but thickened. No mastoid tenderness, erythema, or ear bulging  Nose: Nares bilaterally patent, no discharge  Mouth: No Stridor / Drooling / Trismus.  Mucosa moist, tongue/uvula midline, oropharynx clear  Neck: + tracheostomy tube in the neck secured with posey. Flat, supple, no lymphadenopathy, trachea midline, no masses  Lymphatic: No lymphadenopathy  Resp: on full ventilator support breathing easily, no stridor  Neuro: facial nerve intact, no facial droop      Image    < from: CT Internal Auditory Canals No Cont (09.12.23 @ 17:13) >    ACC: [id number] EXAM:  CT IAC   ORDERED BY: JORGE DWYER     PROCEDURE DATE:  09/12/2023          INTERPRETATION:  .    CLINICAL INFORMATION: Evaluate for malignant otitis externa    COMPARISON: Prior head CT exam from 9/8/2023. Prior brain MRI study dated   8/17/2023. No prior temporal bone CT exams are available for comparison.    TECHNIQUE: Non contrast high resolution CT imaging of the temporal bones   was performed. Sagittal and coronal reformatted images were obtained from   the source data and also reviewed.    FINDINGS:    On the right, the ear and periauricular soft tissues appear unremarkable.   The external auditory canal is normal in appearance. The tympanic   membrane appears unremarkable. The middle ear cavity is well-aerated. The   ossicular chain is intact. The otic capsule appears within normal limits.   The inner ear structures are normally formed. The cochlea, vestibule, and   semicircular canals appear unremarkable. The internal auditory canal is   normal in size. Thefacial nerve has a normal course. Nonspecific soft   tissue and/or debris and/or fluid opacifies the mastoid antrum as well as   the mastoid air cells. Sclerosis is seen involving the right mastoid   process and tip. There is no mastoid destruction. The vestibular aqueduct   appears unremarkable. The bony coverings of the vascular structures are   intact.    On the left, soft tissue thickening is seen involving the pinna with   calcifications of the cartilage. There is also soft tissue thickening,   debris and/or fluid involving the left external auditory canal. No gross   bony erosive change is seen involving the bony external auditory canal.   The underlying left parotid gland appears unremarkable. The tympanic   membrane is calcified and thickened. The ossicular chain appears abnormal   with areas of demineralization as well as areas of multiple calcific   deposits. The ossicular chain is completely surrounded by abnormal soft   tissue which fills the middle ear cavity. The tegmen is intact. Abnormal   soft tissue and/or debris and/or fluid continues into the mastoid antrum   and cavity which appear underpneumatized and sclerotic. This continues to   the mastoid process and tip. The internal auditory canal is normal in   caliber. The facial nerve has a normal course. The otic capsule appears   intact. The inner ear structures are normally formed. The cochlea,   vestibule, and semicircular canals appear unremarkable. The vestibular   aqueduct appears within normal limits. The bony covering of the vascular   structures are intact.    Chronic infarction involving the left paramedian occipital lobe is seen.   Microvascular type changes are again notable. There is mild   ventriculomegaly appears unchanged.    Scattered mucosal thickening and/or debris is seen throughout the   paranasal sinuses.    A left-sided NG tube in place. Aerated secretions and/or debris and/or   fluid are seen within the posterior nasal cavity layering into the   nasopharynx.    The patient is status post tracheostomy.    IMPRESSION:    1. Acute on chronic left-sided otitis externa and acute on chronic   left-sided otomastoiditis. 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 Superimposed cholesteatoma formation within the left   tympanomastoid cavity cannot be excluded. No evidence of malignant otitis   externa.    2. Chronic right-sided mastoiditis.    --- End of Report ---    < end of copied text >

## 2023-09-13 NOTE — PROGRESS NOTE ADULT - SUBJECTIVE AND OBJECTIVE BOX
Subjective: Patient seen and examined. No new events except as noted.     REVIEW OF SYSTEMS:  Unable to obtain     MEDICATIONS:  MEDICATIONS  (STANDING):  albuterol/ipratropium for Nebulization 3 milliLiter(s) Nebulizer every 6 hours  atorvastatin 40 milliGRAM(s) Oral at bedtime  chlorhexidine 0.12% Liquid 15 milliLiter(s) Oral Mucosa every 12 hours  chlorhexidine 2% Cloths 1 Application(s) Topical daily  ciprofloxacin/hydrocortisone Suspension Otic 4 Drop(s) Left Ear two times a day  dextrose 5%. 1000 milliLiter(s) (50 mL/Hr) IV Continuous <Continuous>  dextrose 5%. 1000 milliLiter(s) (100 mL/Hr) IV Continuous <Continuous>  dextrose 50% Injectable 12.5 Gram(s) IV Push once  dextrose 50% Injectable 25 Gram(s) IV Push once  dextrose 50% Injectable 25 Gram(s) IV Push once  doxazosin 6 milliGRAM(s) Oral <User Schedule>  ferrous    sulfate Liquid 300 milliGRAM(s) Enteral Tube daily  FIRST- Mouthwash  BLM 10 milliLiter(s) Swish and Spit four times a day  glucagon  Injectable 1 milliGRAM(s) IntraMuscular once  heparin  Infusion 1000 Unit(s)/Hr (8.5 mL/Hr) IV Continuous <Continuous>  insulin lispro (ADMELOG) corrective regimen sliding scale   SubCutaneous every 6 hours  insulin NPH human recombinant 3 Unit(s) SubCutaneous every 6 hours  meropenem  IVPB 500 milliGRAM(s) IV Intermittent every 12 hours  mupirocin 2% Ointment 1 Application(s) Both Nostrils two times a day  pantoprazole  Injectable 40 milliGRAM(s) IV Push two times a day  psyllium Powder 1 Packet(s) Oral daily  sevelamer carbonate Powder 800 milliGRAM(s) Oral three times a day  sodium chloride 3%  Inhalation 4 milliLiter(s) Inhalation every 6 hours      PHYSICAL EXAM:  T(C): 37.1 (09-13-23 @ 08:07), Max: 37.5 (09-12-23 @ 11:58)  HR: 101 (09-13-23 @ 08:07) (92 - 113)  BP: 119/43 (09-13-23 @ 08:07) (100/41 - 125/44)  RR: 20 (09-13-23 @ 08:07) (20 - 26)  SpO2: 96% (09-13-23 @ 08:07) (95% - 100%)  Wt(kg): --  I&O's Summary    12 Sep 2023 07:01  -  13 Sep 2023 07:00  --------------------------------------------------------  IN: 928 mL / OUT: 0 mL / NET: 928 mL    13 Sep 2023 07:01  -  13 Sep 2023 09:49  --------------------------------------------------------  IN: 1 mL / OUT: 0 mL / NET: 1 mL            Appearance: NAD, +trach  HEENT: dry oral mucosa  Lymphatic: No lymphadenopathy  Cardiovascular: Normal S1 S2, No JVD, No murmurs, No edema  Respiratory: Decreased BS, + trach to vent	  Neuro: opens eyes  Gastrointestinal: Soft, Non-tender, + BS, + OGT	  Skin: No rashes, No ecchymoses, No cyanosis	  Extremities: No strength/ROM 2/2 sedation, + BL LE edema  Vascular: Peripheral pulses palpable 2+ bilaterally          LABS:    CARDIAC MARKERS:                                6.4    7.01  )-----------( 203      ( 13 Sep 2023 02:00 )             19.8     09-13    124<L>  |  88<L>  |  84<H>  ----------------------------<  151<H>  4.4   |  23  |  2.54<H>    Ca    8.0<L>      13 Sep 2023 02:00  Phos  3.7     09-13  Mg     2.40     09-13      proBNP:   Lipid Profile:   HgA1c:   TSH:     Trace          TELEMETRY: 	    ECG:  	  RADIOLOGY:   DIAGNOSTIC TESTING:  [ ] Echocardiogram:  [ ]  Catheterization:  [ ] Stress Test:    OTHER:

## 2023-09-13 NOTE — PROGRESS NOTE ADULT - SUBJECTIVE AND OBJECTIVE BOX
NEPHROLOGY     Patient seen and examined with spouse at bedside, trach to vent,     MEDICATIONS  (STANDING):  albuterol/ipratropium for Nebulization 3 milliLiter(s) Nebulizer every 6 hours  atorvastatin 40 milliGRAM(s) Oral at bedtime  chlorhexidine 0.12% Liquid 15 milliLiter(s) Oral Mucosa every 12 hours  chlorhexidine 2% Cloths 1 Application(s) Topical daily  ciprofloxacin/hydrocortisone Suspension Otic 4 Drop(s) Left Ear two times a day  dextrose 5%. 1000 milliLiter(s) (50 mL/Hr) IV Continuous <Continuous>  dextrose 5%. 1000 milliLiter(s) (100 mL/Hr) IV Continuous <Continuous>  dextrose 50% Injectable 12.5 Gram(s) IV Push once  dextrose 50% Injectable 25 Gram(s) IV Push once  dextrose 50% Injectable 25 Gram(s) IV Push once  doxazosin 6 milliGRAM(s) Oral <User Schedule>  ferrous    sulfate Liquid 300 milliGRAM(s) Enteral Tube daily  FIRST- Mouthwash  BLM 10 milliLiter(s) Swish and Spit four times a day  glucagon  Injectable 1 milliGRAM(s) IntraMuscular once  heparin  Infusion 1000 Unit(s)/Hr (8.5 mL/Hr) IV Continuous <Continuous>  insulin lispro (ADMELOG) corrective regimen sliding scale   SubCutaneous every 6 hours  insulin NPH human recombinant 3 Unit(s) SubCutaneous every 6 hours  meropenem  IVPB 500 milliGRAM(s) IV Intermittent every 12 hours  mupirocin 2% Ointment 1 Application(s) Topical two times a day  mupirocin 2% Ointment 1 Application(s) Both Nostrils two times a day  pantoprazole  Injectable 40 milliGRAM(s) IV Push two times a day  psyllium Powder 1 Packet(s) Oral daily  sevelamer carbonate Powder 800 milliGRAM(s) Oral three times a day  sodium chloride 3%  Inhalation 4 milliLiter(s) Inhalation every 6 hours    VITALS:  T(C): , Max: 37.4 (09-12-23 @ 15:18)  T(F): , Max: 99.4 (09-12-23 @ 15:18)  HR: 94 (09-13-23 @ 10:48)  BP: 119/43 (09-13-23 @ 08:07)  RR: 20 (09-13-23 @ 08:07)  SpO2: 97% (09-13-23 @ 10:48)    I and O's:    09-12 @ 07:01  -  09-13 @ 07:00  --------------------------------------------------------  IN: 928 mL / OUT: 0 mL / NET: 928 mL    09-13 @ 07:01  -  09-13 @ 13:40  --------------------------------------------------------  IN: 1 mL / OUT: 0 mL / NET: 1 mL    PHYSICAL EXAM:      LABS:                        7.8    8.52  )-----------( 195      ( 13 Sep 2023 10:05 )             24.1     09-13    124<L>  |  88<L>  |  84<H>  ----------------------------<  151<H>  4.4   |  23  |  2.54<H>    Ca    8.0<L>      13 Sep 2023 02:00  Phos  3.7     09-13  Mg     2.40     09-13    Urine Studies:  Urinalysis Basic - ( 13 Sep 2023 02:00 )    Color: x / Appearance: x / SG: x / pH: x  Gluc: 151 mg/dL / Ketone: x  / Bili: x / Urobili: x   Blood: x / Protein: x / Nitrite: x   Leuk Esterase: x / RBC: x / WBC x   Sq Epi: x / Non Sq Epi: x / Bacteria: x    RADIOLOGY & ADDITIONAL STUDIES:  < from: CT Internal Auditory Canals No Cont (09.12.23 @ 17:13) >  IMPRESSION:    1. Acute on chronic left-sided otitis externa and acute on chronic   left-sided otomastoiditis. 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 Superimposed cholesteatoma formation within the left   tympanomastoid cavity cannot be excluded. No evidence of malignant otitis   externa.    2. Chronic right-sided mastoiditis.    --- End of Report ---        < end of copied text >   NEPHROLOGY     Patient seen and examined with spouse at bedside, trach to vent.     MEDICATIONS  (STANDING):  albuterol/ipratropium for Nebulization 3 milliLiter(s) Nebulizer every 6 hours  atorvastatin 40 milliGRAM(s) Oral at bedtime  chlorhexidine 0.12% Liquid 15 milliLiter(s) Oral Mucosa every 12 hours  chlorhexidine 2% Cloths 1 Application(s) Topical daily  ciprofloxacin/hydrocortisone Suspension Otic 4 Drop(s) Left Ear two times a day  dextrose 5%. 1000 milliLiter(s) (50 mL/Hr) IV Continuous <Continuous>  dextrose 5%. 1000 milliLiter(s) (100 mL/Hr) IV Continuous <Continuous>  dextrose 50% Injectable 12.5 Gram(s) IV Push once  dextrose 50% Injectable 25 Gram(s) IV Push once  dextrose 50% Injectable 25 Gram(s) IV Push once  doxazosin 6 milliGRAM(s) Oral <User Schedule>  ferrous    sulfate Liquid 300 milliGRAM(s) Enteral Tube daily  FIRST- Mouthwash  BLM 10 milliLiter(s) Swish and Spit four times a day  glucagon  Injectable 1 milliGRAM(s) IntraMuscular once  heparin  Infusion 1000 Unit(s)/Hr (8.5 mL/Hr) IV Continuous <Continuous>  insulin lispro (ADMELOG) corrective regimen sliding scale   SubCutaneous every 6 hours  insulin NPH human recombinant 3 Unit(s) SubCutaneous every 6 hours  meropenem  IVPB 500 milliGRAM(s) IV Intermittent every 12 hours  mupirocin 2% Ointment 1 Application(s) Topical two times a day  mupirocin 2% Ointment 1 Application(s) Both Nostrils two times a day  pantoprazole  Injectable 40 milliGRAM(s) IV Push two times a day  psyllium Powder 1 Packet(s) Oral daily  sevelamer carbonate Powder 800 milliGRAM(s) Oral three times a day  sodium chloride 3%  Inhalation 4 milliLiter(s) Inhalation every 6 hours    VITALS:  T(C): , Max: 37.4 (09-12-23 @ 15:18)  T(F): , Max: 99.4 (09-12-23 @ 15:18)  HR: 94 (09-13-23 @ 10:48)  BP: 119/43 (09-13-23 @ 08:07)  RR: 20 (09-13-23 @ 08:07)  SpO2: 97% (09-13-23 @ 10:48)    I and O's:    09-12 @ 07:01  -  09-13 @ 07:00  --------------------------------------------------------  IN: 928 mL / OUT: 0 mL / NET: 928 mL    09-13 @ 07:01  -  09-13 @ 13:40  --------------------------------------------------------  IN: 1 mL / OUT: 0 mL / NET: 1 mL    PHYSICAL EXAM:      LABS:                        7.8    8.52  )-----------( 195      ( 13 Sep 2023 10:05 )             24.1     09-13    124<L>  |  88<L>  |  84<H>  ----------------------------<  151<H>  4.4   |  23  |  2.54<H>    Ca    8.0<L>      13 Sep 2023 02:00  Phos  3.7     09-13  Mg     2.40     09-13    Urine Studies:  Urinalysis Basic - ( 13 Sep 2023 02:00 )    Color: x / Appearance: x / SG: x / pH: x  Gluc: 151 mg/dL / Ketone: x  / Bili: x / Urobili: x   Blood: x / Protein: x / Nitrite: x   Leuk Esterase: x / RBC: x / WBC x   Sq Epi: x / Non Sq Epi: x / Bacteria: x    RADIOLOGY & ADDITIONAL STUDIES:  < from: CT Internal Auditory Canals No Cont (09.12.23 @ 17:13) >  IMPRESSION:    1. Acute on chronic left-sided otitis externa and acute on chronic   left-sided otomastoiditis. 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 Superimposed cholesteatoma formation within the left   tympanomastoid cavity cannot be excluded. No evidence of malignant otitis   externa.    2. Chronic right-sided mastoiditis.    --- End of Report ---        < end of copied text >

## 2023-09-13 NOTE — PROGRESS NOTE ADULT - SUBJECTIVE AND OBJECTIVE BOX
CHIEF COMPLAINT: Patient is a 73y old  Female who presents with a chief complaint of Respiratory distress.      Interval Events:      REVIEW OF SYSTEMS:  [ ] All other systems negative  [ ] Unable to assess ROS because ________      Mode: AC/ CMV (Assist Control/ Continuous Mandatory Ventilation), RR (machine): 20, TV (machine): 300, FiO2: 30, PEEP: 8, MAP: 15, PIP: 37      OBJECTIVE:  ICU Vital Signs Last 24 Hrs  T(C): 37.2 (13 Sep 2023 02:21), Max: 37.8 (12 Sep 2023 08:53)  T(F): 98.9 (13 Sep 2023 02:21), Max: 100.1 (12 Sep 2023 08:53)  HR: 104 (13 Sep 2023 02:21) (94 - 113)  BP: 118/45 (13 Sep 2023 02:21) (100/41 - 124/63)  RR: 20 (13 Sep 2023 02:21) (20 - 28)  SpO2: 97% (13 Sep 2023 02:21) (95% - 100%)    O2 Parameters below as of 13 Sep 2023 02:21  Patient On (Oxygen Delivery Method): ventilator, a/c  O2 Flow (L/min): 35      Mode: AC/ CMV (Assist Control/ Continuous Mandatory Ventilation), RR (machine): 20, TV (machine): 300, FiO2: 30, PEEP: 8, MAP: 15, PIP: 37    09-12 @ 07:01  -  09-13 @ 07:00  --------------------------------------------------------  IN: 508 mL / OUT: 0 mL / NET: 508 mL      CAPILLARY BLOOD GLUCOSE      POCT Blood Glucose.: 169 mg/dL (13 Sep 2023 05:16)      HOSPITAL MEDICATIONS:  MEDICATIONS  (STANDING):  albuterol/ipratropium for Nebulization 3 milliLiter(s) Nebulizer every 6 hours  atorvastatin 40 milliGRAM(s) Oral at bedtime  chlorhexidine 0.12% Liquid 15 milliLiter(s) Oral Mucosa every 12 hours  chlorhexidine 2% Cloths 1 Application(s) Topical daily  ciprofloxacin/hydrocortisone Suspension Otic 4 Drop(s) Left Ear two times a day  dextrose 5%. 1000 milliLiter(s) (50 mL/Hr) IV Continuous <Continuous>  dextrose 5%. 1000 milliLiter(s) (100 mL/Hr) IV Continuous <Continuous>  dextrose 50% Injectable 12.5 Gram(s) IV Push once  dextrose 50% Injectable 25 Gram(s) IV Push once  dextrose 50% Injectable 25 Gram(s) IV Push once  doxazosin 6 milliGRAM(s) Oral <User Schedule>  ferrous    sulfate Liquid 300 milliGRAM(s) Enteral Tube daily  FIRST- Mouthwash  BLM 10 milliLiter(s) Swish and Spit four times a day  glucagon  Injectable 1 milliGRAM(s) IntraMuscular once  heparin  Infusion 1000 Unit(s)/Hr (8.5 mL/Hr) IV Continuous <Continuous>  insulin lispro (ADMELOG) corrective regimen sliding scale   SubCutaneous every 6 hours  insulin NPH human recombinant 3 Unit(s) SubCutaneous every 6 hours  meropenem  IVPB 500 milliGRAM(s) IV Intermittent every 12 hours  mupirocin 2% Ointment 1 Application(s) Both Nostrils two times a day  pantoprazole  Injectable 40 milliGRAM(s) IV Push two times a day  psyllium Powder 1 Packet(s) Oral daily  sevelamer carbonate Powder 800 milliGRAM(s) Oral three times a day  sodium chloride 3%  Inhalation 4 milliLiter(s) Inhalation every 6 hours    MEDICATIONS  (PRN):  acetaminophen   Oral Liquid .. 650 milliGRAM(s) Oral every 6 hours PRN Temp greater or equal to 38C (100.4F), Mild Pain (1 - 3)  dextrose Oral Gel 15 Gram(s) Oral once PRN Blood Glucose LESS THAN 70 milliGRAM(s)/deciliter      LABS:                        6.4    7.01  )-----------( 203      ( 13 Sep 2023 02:00 )             19.8     09-13    124<L>  |  88<L>  |  84<H>  ----------------------------<  151<H>  4.4   |  23  |  2.54<H>    Ca    8.0<L>      13 Sep 2023 02:00  Phos  3.7     09-13  Mg     2.40     09-13      PT/INR - ( 11 Sep 2023 16:05 )   PT: 10.1 sec;   INR: <0.90 ratio         PTT - ( 13 Sep 2023 02:00 )  PTT:76.3 sec  Urinalysis Basic - ( 13 Sep 2023 02:00 )    Color: x / Appearance: x / SG: x / pH: x  Gluc: 151 mg/dL / Ketone: x  / Bili: x / Urobili: x   Blood: x / Protein: x / Nitrite: x   Leuk Esterase: x / RBC: x / WBC x   Sq Epi: x / Non Sq Epi: x / Bacteria: x        PAST MEDICAL & SURGICAL HISTORY:  Breast CA      Diabetes      Stroke      Cardiac arrest      HTN (hypertension)      H/O mastectomy, bilateral      FAMILY HISTORY:  No pertinent family history in first degree relatives        Social History:      RADIOLOGY:  [ ] Reviewed and interpreted by me    PULMONARY FUNCTION TESTS:    EKG: CHIEF COMPLAINT: Patient is a 73y old  Female who presents with a chief complaint of Respiratory distress.      Interval Events: Pt remained afebrile overnight. Remained stable on vent support.      REVIEW OF SYSTEMS:  [x] Unable to assess ROS because vented      Mode: AC/ CMV (Assist Control/ Continuous Mandatory Ventilation), RR (machine): 20, TV (machine): 300, FiO2: 30, PEEP: 8, MAP: 15, PIP: 37      OBJECTIVE:  ICU Vital Signs Last 24 Hrs  T(C): 37.2 (13 Sep 2023 02:21), Max: 37.8 (12 Sep 2023 08:53)  T(F): 98.9 (13 Sep 2023 02:21), Max: 100.1 (12 Sep 2023 08:53)  HR: 104 (13 Sep 2023 02:21) (94 - 113)  BP: 118/45 (13 Sep 2023 02:21) (100/41 - 124/63)  RR: 20 (13 Sep 2023 02:21) (20 - 28)  SpO2: 97% (13 Sep 2023 02:21) (95% - 100%)    O2 Parameters below as of 13 Sep 2023 02:21  Patient On (Oxygen Delivery Method): ventilator, a/c  O2 Flow (L/min): 35      Mode: AC/ CMV (Assist Control/ Continuous Mandatory Ventilation), RR (machine): 20, TV (machine): 300, FiO2: 30, PEEP: 8, MAP: 15, PIP: 37    09-12 @ 07:01  -  09-13 @ 07:00  --------------------------------------------------------  IN: 508 mL / OUT: 0 mL / NET: 508 mL      CAPILLARY BLOOD GLUCOSE      POCT Blood Glucose.: 169 mg/dL (13 Sep 2023 05:16)      HOSPITAL MEDICATIONS:  MEDICATIONS  (STANDING):  albuterol/ipratropium for Nebulization 3 milliLiter(s) Nebulizer every 6 hours  atorvastatin 40 milliGRAM(s) Oral at bedtime  chlorhexidine 0.12% Liquid 15 milliLiter(s) Oral Mucosa every 12 hours  chlorhexidine 2% Cloths 1 Application(s) Topical daily  ciprofloxacin/hydrocortisone Suspension Otic 4 Drop(s) Left Ear two times a day  dextrose 5%. 1000 milliLiter(s) (50 mL/Hr) IV Continuous <Continuous>  dextrose 5%. 1000 milliLiter(s) (100 mL/Hr) IV Continuous <Continuous>  dextrose 50% Injectable 12.5 Gram(s) IV Push once  dextrose 50% Injectable 25 Gram(s) IV Push once  dextrose 50% Injectable 25 Gram(s) IV Push once  doxazosin 6 milliGRAM(s) Oral <User Schedule>  ferrous    sulfate Liquid 300 milliGRAM(s) Enteral Tube daily  FIRST- Mouthwash  BLM 10 milliLiter(s) Swish and Spit four times a day  glucagon  Injectable 1 milliGRAM(s) IntraMuscular once  heparin  Infusion 1000 Unit(s)/Hr (8.5 mL/Hr) IV Continuous <Continuous>  insulin lispro (ADMELOG) corrective regimen sliding scale   SubCutaneous every 6 hours  insulin NPH human recombinant 3 Unit(s) SubCutaneous every 6 hours  meropenem  IVPB 500 milliGRAM(s) IV Intermittent every 12 hours  mupirocin 2% Ointment 1 Application(s) Both Nostrils two times a day  pantoprazole  Injectable 40 milliGRAM(s) IV Push two times a day  psyllium Powder 1 Packet(s) Oral daily  sevelamer carbonate Powder 800 milliGRAM(s) Oral three times a day  sodium chloride 3%  Inhalation 4 milliLiter(s) Inhalation every 6 hours    MEDICATIONS  (PRN):  acetaminophen   Oral Liquid .. 650 milliGRAM(s) Oral every 6 hours PRN Temp greater or equal to 38C (100.4F), Mild Pain (1 - 3)  dextrose Oral Gel 15 Gram(s) Oral once PRN Blood Glucose LESS THAN 70 milliGRAM(s)/deciliter      LABS:                        6.4    7.01  )-----------( 203      ( 13 Sep 2023 02:00 )             19.8     09-13    124<L>  |  88<L>  |  84<H>  ----------------------------<  151<H>  4.4   |  23  |  2.54<H>    Ca    8.0<L>      13 Sep 2023 02:00  Phos  3.7     09-13  Mg     2.40     09-13      PT/INR - ( 11 Sep 2023 16:05 )   PT: 10.1 sec;   INR: <0.90 ratio         PTT - ( 13 Sep 2023 02:00 )  PTT:76.3 sec  Urinalysis Basic - ( 13 Sep 2023 02:00 )    Color: x / Appearance: x / SG: x / pH: x  Gluc: 151 mg/dL / Ketone: x  / Bili: x / Urobili: x   Blood: x / Protein: x / Nitrite: x   Leuk Esterase: x / RBC: x / WBC x   Sq Epi: x / Non Sq Epi: x / Bacteria: x        PAST MEDICAL & SURGICAL HISTORY:  Breast CA      Diabetes      Stroke      Cardiac arrest      HTN (hypertension)      H/O mastectomy, bilateral      FAMILY HISTORY:  No pertinent family history in first degree relatives      Social History:      RADIOLOGY:  [ ] Reviewed and interpreted by me    PULMONARY FUNCTION TESTS:    EKG:

## 2023-09-13 NOTE — PROGRESS NOTE ADULT - NS ATTEND AMEND GEN_ALL_CORE FT
Pt is a 73F with PMHx IDDMII, CKD, hx CVA, CHFpEF, latent TB (s/p tx,) hx breast cancer (2018, s/p mastectomy and adjuvant CT/RT), and hx CVA s/p trach/PEG (now decannulated) initially presenting to [facility] on 8/11/23 with acute hypoxemic and hypercapnic respiratory failure s/p ETT intubation/MV and ARF with pulmonary edema c/b HTN emergency requiring nicardipine drip transferred to MICU for further management.     Pt initially p/w worsening RUE shaking and dysconjugate gaze with MRI 8/17 (+) new right cerebellar, midbrain, and multiple tiny embolic infarcts as well as known left PCA CVA. EEG (-). 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 ILR in place from prior CVA evaluation, last interrogated 9/7 without AF. At baseline, pt reportedly wheelchair bound with RUE tremors and some ADL assistance. Pt was unable to tolerate ASA 2/2 GIB.     Pt with acute hypoxemic and hypercapnic respiratory failure s/p ETT intubation/MV possibly 2/2 flash pulmonary edema in the setting of HTN emergency +/- aspiration event. Pt with failure of ventilatory weaning now s/p tracheostomy placement (IP 9/1). Of note, pt also noted to have evidence of midbrain CVA with possible concern for central effect on respiratory drive, will monitor carefully and continue weaning trials as tolerated. Airway clearance therapy in place. Wean O2 supplementation for goal O2 saturation 90-95%. Oral hygiene and aspiration precautions in place.      Pt p/w ARF on CKD requiring initiation of HD 2/2 pulmonary edema and metabolic acidosis with metabolic encephalopathy. No urgent indication for HD today, further HD as per nephrology team. On prednisone taper for possible AIN. Strict I/Os, pt makes small amount of urine. Can attempt diuretic challenge.     Hospital course further c/b recurrent infections, most recently with MSSA bacteremia (9/1, cleared 9/4 and 9/8) with (+) MSSA and ESBL EColi in BAL as well. Pt with possible cefepime vs. cefazolin drug-induced rash followed by transition to vancomycin. Was then found to have left otitis externa (9/5) now requiring serial bedside debridement with worsening necrotic tissue/erythema for which she was also initiated on meropenem. CTTB pending, family refusing contrast. Will proceed with noncontrast study for now. Cultures NTD, pt remains febrile intermittently. Will continue with meropenem and Ciprodex otic drops x10 day course for now as well as continuation of vancomycin by level x4 week course through 10/2 (unable to exclude endocarditis).      Pt also with oropharyngeal dysphagia requiring NGTs followed by UGIB s/p EGD (8/31) found to have esophagitis/erosive gastropathy now on PPI BID. Pt further found to have popliteal DVT. No c/i by GI for A/C, pt initiated on heparin drip with patient-specific pTTs. Will need to ultimately transition to Coumadin x3-6 months.    Dispo pending medical optimization. Pt full code. DVT ppx full A/C with heparin drip. Discussed with pt's family ( and daughter) at bedside daily.

## 2023-09-13 NOTE — PROGRESS NOTE ADULT - ASSESSMENT
72 y/o F well known to me from my housecal outptn practice. 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   In  am of 8/11/23  ptn presented with respiratory distress w hypoxia and hypercarbia requiring intubation 2/2 volume overload +/- Asp PNA      Neuro   responds appropriately   Baseline MS AOx3, aphasic   - h/o CVA , on aspirin and statin . 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 Mcnulty, now in RCU  s/p septic shock overnight 9/1, now on meropenem, HDS  s/p trache, on vent support, copious secretions      Renal   Hyperkalemia with EKG changes  , initially treated w LOKELMA,  now resolved   Ptn well know to Dr. Colon, consult reviewed    HD 8/19,  8/21, 8/26 and 8/28.  s/p PUF 8/29 2.5 Liters, HD 8/30,  9/1, 9/4  started chronic HD 9/7,   cardura resumed  no HD today, on diuretics      GI  NPO  on tube feeds  coffee ground emesis x 1 8/24, melena overnight 8/31, s/p 1UPRBC, EGD/Colonoscopy: erosive gastropathy, esophagisits, on colonoscopy old blood seen no active bleeding, poor prep  family to decide re PEG placement    Endocrine  T2DM   A1C 7.1 in 7/2023   - BG goal 140-200     ID   No fever/leukocytosis, recheck temp   Hx latent TB which was treated, no concern for TB   - grew MAC on AFB culture from most recent admission. at [facility]  -MSSA Bacteremia 9/1, allergic to cephalosprins and PCN, on Vanco as per ID, renal dosing,   - sputum also grew E.Coli-ESBL, as per ID recs for  [place] through 9/7( 1 week course as per ID) , afebrile.  - 9/8:  spike  temp 38.1C, has O. externa, has a wick, recs are to get a CT to R/O an abscess/bony involvement. cont Meropenem  9/9: ptn w fever overnight to 100.8,  may need shiley pulled if bacteremic, needs NECK CT as per ENT to R/O Mastoid bone involvement while having ongoing purulent DC from L ear 2/2 O. externa, getting daily wick changes  9/10:  spiked 102 overnight through Bradford and Vanco, purulent DC from O. externa, ENT recommend Ct of mastoid. ptn in HD, has Shiley in place, consider pulling shiley and send for Cx. would d/w Renal, and consider contacting  ID, last seen by Dr. Conner 9/6 9/11: remains febrile, Dr. Conner reconsulted. cont Bradford, Vanco  9/12: tmax 100.5, fever curve is improving, awaiting Left ear CT, on Bradford since 9/1. on  vanco for MSSA Bacteremia, does not tolerate cephalosporins. through 10/2  9/13: on full vent support via trache, appears uncomfortable and onur 2/2 Left O. externa. has an incidental left middle ear tumor, no acute middle ear interventions recommended, left ear wick replaced. on Meropenem, cx from Ear DC is neg. On Vanco for MSSA bacteremia through 10/2, course of Meropenem as per ID, afebrile in the past 24 hrs . Cardura for HTN resumed, HGB dropped to 6.4, got a dose of EPO and 1UPRBC, rpt 7.8, remains on HEPARIN drip for LEFT popliteal DVT            Heme/Onc  ppx: place on SCD  Transfuse for hgb 6.4, no obv bleed. HDS  LEFT POPLITEAL VEIN ACUTE DVT on 9/10    Ethics  GOC - Discussed GOC with daughter and , they have opted in the past for full code. and she remains full code at present

## 2023-09-13 NOTE — PROGRESS NOTE ADULT - SUBJECTIVE AND OBJECTIVE BOX
Patient is a 73y old  Female who presents with a chief complaint of Respiratory distress (13 Sep 2023 17:28)      SUBJECTIVE / OVERNIGHT EVENTS: on full vent support via trache, appears uncomfortable and onur 2/2 Left O. externa. has an incidental left middle ear tumor, no acute middle ear interventions recommended, left ear wick replaced. on Meropenem, cx from Ear DC is neg. On Vanco for MSSA bacteremia through 10/2, course of Meropenem as per ID, afebrile in the past 24 hrs . Cardura for HTN resumed, HGB dropped to 6.4, got a dose of EPO and 1UPRBC, rpt 7.8, remains on HEPARIN drip for LEFT popliteal DVT    MEDICATIONS  (STANDING):  albuterol/ipratropium for Nebulization 3 milliLiter(s) Nebulizer every 6 hours  atorvastatin 40 milliGRAM(s) Oral at bedtime  chlorhexidine 0.12% Liquid 15 milliLiter(s) Oral Mucosa every 12 hours  chlorhexidine 2% Cloths 1 Application(s) Topical daily  ciprofloxacin/hydrocortisone Suspension Otic 4 Drop(s) Left Ear two times a day  dextrose 5%. 1000 milliLiter(s) (50 mL/Hr) IV Continuous <Continuous>  dextrose 5%. 1000 milliLiter(s) (100 mL/Hr) IV Continuous <Continuous>  dextrose 50% Injectable 25 Gram(s) IV Push once  dextrose 50% Injectable 25 Gram(s) IV Push once  dextrose 50% Injectable 12.5 Gram(s) IV Push once  doxazosin 6 milliGRAM(s) Oral <User Schedule>  epoetin odalis (EPOGEN) Injectable 42222 Unit(s) SubCutaneous <User Schedule>  ferrous    sulfate Liquid 300 milliGRAM(s) Enteral Tube daily  FIRST- Mouthwash  BLM 10 milliLiter(s) Swish and Spit four times a day  glucagon  Injectable 1 milliGRAM(s) IntraMuscular once  heparin  Infusion 1000 Unit(s)/Hr (8.5 mL/Hr) IV Continuous <Continuous>  insulin lispro (ADMELOG) corrective regimen sliding scale   SubCutaneous every 6 hours  insulin NPH human recombinant 3 Unit(s) SubCutaneous every 6 hours  meropenem  IVPB 500 milliGRAM(s) IV Intermittent every 12 hours  mupirocin 2% Ointment 1 Application(s) Topical two times a day  mupirocin 2% Ointment 1 Application(s) Both Nostrils two times a day  pantoprazole  Injectable 40 milliGRAM(s) IV Push two times a day  psyllium Powder 1 Packet(s) Oral daily  sevelamer carbonate Powder 800 milliGRAM(s) Oral three times a day  sodium chloride 3%  Inhalation 4 milliLiter(s) Inhalation every 6 hours    MEDICATIONS  (PRN):  acetaminophen   Oral Liquid .. 650 milliGRAM(s) Oral every 6 hours PRN Temp greater or equal to 38C (100.4F), Mild Pain (1 - 3)  dextrose Oral Gel 15 Gram(s) Oral once PRN Blood Glucose LESS THAN 70 milliGRAM(s)/deciliter      Vital Signs Last 24 Hrs  T(F): 99.1 (09-13-23 @ 14:10), Max: 99.1 (09-13-23 @ 04:12)  HR: 97 (09-13-23 @ 16:10) (92 - 104)  BP: 121/50 (09-13-23 @ 14:10) (111/47 - 125/44)  RR: 20 (09-13-23 @ 14:10) (20 - 23)  SpO2: 97% (09-13-23 @ 16:10) (96% - 100%)  Telemetry:   CAPILLARY BLOOD GLUCOSE      POCT Blood Glucose.: 239 mg/dL (13 Sep 2023 17:06)  POCT Blood Glucose.: 195 mg/dL (13 Sep 2023 11:40)  POCT Blood Glucose.: 169 mg/dL (13 Sep 2023 05:16)  POCT Blood Glucose.: 163 mg/dL (12 Sep 2023 23:37)  POCT Blood Glucose.: 204 mg/dL (12 Sep 2023 18:05)    I&O's Summary    12 Sep 2023 07:01  -  13 Sep 2023 07:00  --------------------------------------------------------  IN: 928 mL / OUT: 0 mL / NET: 928 mL    13 Sep 2023 07:01  -  13 Sep 2023 17:36  --------------------------------------------------------  IN: 1 mL / OUT: 0 mL / NET: 1 mL        PHYSICAL EXAM:  GENERAL: NAD, well-developed  HEAD:  Atraumatic, Normocephalic  EYES: EOMI, PERRLA, conjunctiva and sclera clear  NECK: Supple, No JVD  CHEST/LUNG: Clear to auscultation bilaterally; No wheeze  HEART: Regular rate and rhythm; No murmurs, rubs, or gallops  ABDOMEN: Soft, Nontender, Nondistended; Bowel sounds present  EXTREMITIES:  2+ Peripheral Pulses, No clubbing, cyanosis, or edema  PSYCH: AAOx3  NEUROLOGY: non-focal  SKIN: No rashes or lesions    LABS:                        7.8    8.52  )-----------( 195      ( 13 Sep 2023 10:05 )             24.1     09-13    124<L>  |  88<L>  |  84<H>  ----------------------------<  151<H>  4.4   |  23  |  2.54<H>    Ca    8.0<L>      13 Sep 2023 02:00  Phos  3.7     09-13  Mg     2.40     09-13      PTT - ( 13 Sep 2023 12:00 )  PTT:63.5 sec      Urinalysis Basic - ( 13 Sep 2023 02:00 )    Color: x / Appearance: x / SG: x / pH: x  Gluc: 151 mg/dL / Ketone: x  / Bili: x / Urobili: x   Blood: x / Protein: x / Nitrite: x   Leuk Esterase: x / RBC: x / WBC x   Sq Epi: x / Non Sq Epi: x / Bacteria: x        RADIOLOGY & ADDITIONAL TESTS:    Imaging Personally Reviewed:    Consultant(s) Notes Reviewed:      Care Discussed with Consultants/Other Providers:

## 2023-09-13 NOTE — PROGRESS NOTE ADULT - ASSESSMENT
IMPRESSION: 73F w/ HTN, DM2, CVA, breast CA-bilateral mastectomy, recurrent aspiration pneumonia/respiratory failure, and CKD, 8/11/23 p/w acute hypercapnic respiratory failure; c/b RUSS    (1)CKD - stage 4     (2)RUSS - ATN vs AIN -BUN and creatinine elevated w/ electrolyte derangements, (hyponatremia, hyperkalemia, hyperphosphatemia). Improved on HD    (3)Hyponatremia - Na+ declining     (4)Hypocalcemia corrects to ~9.5 for hypalbuminemia     (5)Pulm- hypercapnic respiratory failure on admission - now s/p trach; remains on vent     (6)CV - normotensive     (7)ID - on IV Meropenem/Vanco. BCx w/ NGTD    (8)Anemia - s/p PRBC this morning    IMPRESSION: 73F w/ HTN, DM2, CVA, breast CA-bilateral mastectomy, recurrent aspiration pneumonia/respiratory failure, and CKD, 8/11/23 p/w acute hypercapnic respiratory failure; c/b RUSS    (1)CKD - stage 4     (2)RUSS - ATN vs AIN -BUN and creatinine elevated w/ electrolyte derangements, (hyponatremia, hyperkalemia, hyperphosphatemia). Improved on HD    (3)Hyponatremia - Na+ declining     (4)Hypocalcemia corrects to ~9.5 for hypalbuminemia     (5)Pulm- hypercapnic respiratory failure on admission - now s/p trach; remains on vent     (6)CV - normotensive     (7)ID - on IV Meropenem/Vanco. BCx w/ NGTD    (8)Anemia - s/p PRBC this morning     RECOMMEND:  (1)No HD today   (2)No objection to diuretics as needed   (3)Prednisone taper now 10mg po qd x3 days  (4)Continue Renvela 800 mg tid for now  (5)  (6)Dose new meds for GFR <10/HD    Nilda Espinoza, HEAVEN  [facility]  [phone]      IMPRESSION: 73F w/ HTN, DM2, CVA, breast CA-bilateral mastectomy, recurrent aspiration pneumonia/respiratory failure, and CKD, 8/11/23 p/w acute hypercapnic respiratory failure; c/b RUSS    (1)CKD - stage 4     (2)RUSS - ATN vs AIN -BUN and creatinine elevated w/ electrolyte derangements, (hyponatremia, hyperkalemia, hyperphosphatemia). Improved on HD    (3)Hyponatremia - Na+ declining     (4)Hypocalcemia corrects to ~9.5 for hypalbuminemia     (5)Pulm- hypercapnic respiratory failure on admission - now s/p trach; remains on vent     (6)CV - normotensive     (7)ID - on IV Meropenem/Vanco. BCx w/ NGTD    (8)Anemia - s/p PRBC this morning     RECOMMEND:  (1)D/w Dr. Colon who will assess for HD   (2)No objection to diuretics as needed   (3)Prednisone taper now 10mg po qd x3 days  (4)Continue Renvela 800 mg tid for now  (5)No objection to Epogen sq  (6)Dose new meds for GFR <10/HD    Nilda Espinoza DNP  [facility]  [phone]      IMPRESSION: 73F w/ HTN, DM2, CVA, breast CA-bilateral mastectomy, recurrent aspiration pneumonia/respiratory failure, and CKD, 8/11/23 p/w acute hypercapnic respiratory failure; c/b RUSS    (1)CKD - stage 4     (2)RUSS - ATN vs AIN -BUN and creatinine elevated w/ electrolyte derangements, (hyponatremia, hyperkalemia, hyperphosphatemia). Improved on HD    (3)Hyponatremia - Na+ declining     (4)Hypocalcemia corrects to ~9.5 for hypalbuminemia     (5)Pulm- hypercapnic respiratory failure on admission - now s/p trach; remains on vent     (6)CV - normotensive     (7)ID - on IV Meropenem/Vanco. BCx w/ NGTD    (8)Anemia - s/p PRBC this morning     RECOMMEND:  (1)D/w Dr. Colon who will assess for HD   (2)No objection to diuretics as needed   (3)Prednisone taper now 10mg po qd x3 days  (4)Continue Renvela 800 mg tid for now  (5)No objection to Epogen sq  (6)Dose new meds for GFR <10/HD    Nilda Espinoza DNP  [facility]  [phone]     RENAL ATTENDING NOTE  Patient seen and examined with NP. Agree with assessment and plan as above.  No urgent need for HD at present. I would like to try to continue to hold off, with hope that further HD is not needed. We can order Epogen 10K SQ BIW on Mondays and Thursday today, first dose today    Jimmy Colon MD  [facility]  [phone]

## 2023-09-13 NOTE — PROGRESS NOTE ADULT - SUBJECTIVE AND OBJECTIVE BOX
Follow Up:  MSSA bacteremia    Interval History: no more fevers, the CT showed otomastoiditis and otitis external, ENT is not planning for any surgical intervention now    ROS:    Unobtainable because: trached          Allergies  isoniazid (Rash)  nafcillin (Unknown)  hydrALAZINE (Rash)  vitamin E (Short breath; Urticaria; Hives)  doxycycline (Rash)  cefepime (Rash)  NIFEdipine (Urticaria; Hives)        ANTIMICROBIALS:  meropenem  IVPB 500 every 12 hours      OTHER MEDS:  acetaminophen   Oral Liquid .. 650 milliGRAM(s) Oral every 6 hours PRN  albuterol/ipratropium for Nebulization 3 milliLiter(s) Nebulizer every 6 hours  atorvastatin 40 milliGRAM(s) Oral at bedtime  chlorhexidine 0.12% Liquid 15 milliLiter(s) Oral Mucosa every 12 hours  chlorhexidine 2% Cloths 1 Application(s) Topical daily  ciprofloxacin/hydrocortisone Suspension Otic 4 Drop(s) Left Ear two times a day  dextrose 5%. 1000 milliLiter(s) IV Continuous <Continuous>  dextrose 5%. 1000 milliLiter(s) IV Continuous <Continuous>  dextrose 50% Injectable 12.5 Gram(s) IV Push once  dextrose 50% Injectable 25 Gram(s) IV Push once  dextrose 50% Injectable 25 Gram(s) IV Push once  dextrose Oral Gel 15 Gram(s) Oral once PRN  doxazosin 6 milliGRAM(s) Oral <User Schedule>  epoetin odalis (EPOGEN) Injectable 44056 Unit(s) SubCutaneous <User Schedule>  ferrous    sulfate Liquid 300 milliGRAM(s) Enteral Tube daily  FIRST- Mouthwash  BLM 10 milliLiter(s) Swish and Spit four times a day  glucagon  Injectable 1 milliGRAM(s) IntraMuscular once  heparin  Infusion 1000 Unit(s)/Hr IV Continuous <Continuous>  insulin lispro (ADMELOG) corrective regimen sliding scale   SubCutaneous every 6 hours  insulin NPH human recombinant 3 Unit(s) SubCutaneous every 6 hours  mupirocin 2% Ointment 1 Application(s) Topical two times a day  mupirocin 2% Ointment 1 Application(s) Both Nostrils two times a day  pantoprazole  Injectable 40 milliGRAM(s) IV Push two times a day  psyllium Powder 1 Packet(s) Oral daily  sevelamer carbonate Powder 800 milliGRAM(s) Oral three times a day  sodium chloride 3%  Inhalation 4 milliLiter(s) Inhalation every 6 hours      Vital Signs Last 24 Hrs  T(C): 37.3 (13 Sep 2023 14:10), Max: 37.4 (12 Sep 2023 17:36)  T(F): 99.1 (13 Sep 2023 14:10), Max: 99.3 (12 Sep 2023 17:36)  HR: 97 (13 Sep 2023 16:10) (92 - 105)  BP: 121/50 (13 Sep 2023 14:10) (111/47 - 125/44)  BP(mean): --  RR: 20 (13 Sep 2023 14:10) (20 - 26)  SpO2: 97% (13 Sep 2023 16:10) (95% - 100%)    Parameters below as of 13 Sep 2023 16:10  Patient On (Oxygen Delivery Method): ventilator        Physical Exam:  General:    trached   ENT: L with improved edema, some bleeding after the black tissue removed  Respiratory:   trach on vent  abd:   soft, not tender  :     no CVAT, no suprapubic tenderness, no willard  Musculoskeletal : no joint swelling  Skin:    no rash now  vascular: [facility] ilene                                     7.8    8.52  )-----------( 195      ( 13 Sep 2023 10:05 )             24.1       09-13    124<L>  |  88<L>  |  84<H>  ----------------------------<  151<H>  4.4   |  23  |  2.54<H>    Ca    8.0<L>      13 Sep 2023 02:00  Phos  3.7     09-13  Mg     2.40     09-13        Urinalysis Basic - ( 13 Sep 2023 02:00 )    Color: x / Appearance: x / SG: x / pH: x  Gluc: 151 mg/dL / Ketone: x  / Bili: x / Urobili: x   Blood: x / Protein: x / Nitrite: x   Leuk Esterase: x / RBC: x / WBC x   Sq Epi: x / Non Sq Epi: x / Bacteria: x        MICROBIOLOGY:  Vancomycin Level, Random: 23.4 ug/mL (09-13-23 @ 02:00)  v  .Blood Blood-Venous  09-10-23   No growth at 48 Hours  --  --      .Sputum Sputum  09-10-23   Normal Respiratory Cate present  --    Rare polymorphonuclear leukocytes per low power field  No Squamous epithelial cells per low power field  Rare Yeast like cells seen per oil power field  Rare Gram Positive Cocci in Clusters seen per oil power field      .Blood Blood-Peripheral  09-10-23   No growth at 72 Hours  --  --      .Sputum Sputum  09-08-23   Normal Respiratory Cate present  --    Numerous polymorphonuclear leukocytes per low power field  Numerous Squamous epithelial cells per low power field  Few Yeast like cells per oil power field  Results consistent with oropharyngeal contamination      .Blood Blood-Peripheral  09-08-23   No growth at 5 days  --  --      Ear Ear  09-06-23   No growth at 5 days  --  --      .Blood Blood-Peripheral  09-04-23   No growth at 5 days  --  --      .Bronchial Bronchial Lavage  09-01-23   Numerous Staphylococcus aureus Multiple Morphological Strains  Normal Respiratory Cate present  --  Staphylococcus aureus  Staphylococcus aureus      .Blood Blood-Peripheral  09-01-23   Growth in aerobic and anaerobic bottles: Staphylococcus aureus  Direct identification is available within approximately 3-5  hours either by Blood Panel Multiplexed PCR or Direct  MALDI-TOF. Details: [email or web address]  Growth in anaerobic bottle: blood culture bottle turned positive after  the final report was released.  --  Blood Culture PCR  Staphylococcus aureus      ET Tube ET Tube  09-01-23   Moderate Staphylococcus aureus  Moderate Escherichia coli ESBL  Normal Respiratory Cate present  --  Staphylococcus aureus  Escherichia coli ESBL      .Blood Blood-Peripheral  08-16-23   No growth at 5 days  --  --      .Blood Blood-Peripheral  08-16-23   No growth at 5 days  --  --      .Sputum Sputum  08-16-23   Normal Respiratory Cate present  --    Few polymorphonuclear leukocytes per low power field  Rare Squamous epithelial cells per low power field  Few Gram Positive Cocci in Clusters per oil power field          Rapid RVP Result: NotDetec (09-08 @ 11:38)        RADIOLOGY:  Images independently visualized and reviewed personally, findings as below  < from: CT Internal Auditory Canals No Cont (09.12.23 @ 17:13) >  IMPRESSION:    1. Acute on chronic left-sided otitis externa and acute on chronic   left-sided otomastoiditis. 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 Superimposed cholesteatoma formation within the left   tympanomastoid cavity cannot be excluded. No evidence of malignant otitis   externa.    2. Chronic right-sided mastoiditis.    < end of copied text >  < from: Transthoracic Echocardiogram (09.06.23 @ 14:05) >  CONCLUSIONS:  Limited study to evaluate for endocarditis.  Unable to  exclude endocarditis.  Consider STEPHANIE for further evaluation,  if clinically indicated.    < end of copied text >

## 2023-09-13 NOTE — PROGRESS NOTE ADULT - PROBLEM SELECTOR PLAN 1
- Please do not keep dry gauze on pinna, if pus pouring out from ear canal, please send for culture  - Please keep pressure off from left ear at all times  - Continue with  broad spectrum antibiotics- now on meropenem , ciprodex x 10d  - ENT will continue to see and debri the ear daily  - No plan for any surgical intervention from ENT at this time  - ID recommendation appreciated  - Case discussed with Dr. Feng - Please do not keep dry gauze on pinna, if pus pouring out from ear canal, please send for culture  - Please keep pressure off from left ear at all times  - Continue with  broad spectrum antibiotics- now on meropenem , ciprodex x 10d  - Please apply mupirocin ointment to the left conchal bowl BID for 3 days  - ENT will continue to see and debri the ear daily  - No plan for any surgical intervention from ENT at this time  - ID recommendation appreciated  - Case discussed with Dr. Feng

## 2023-09-13 NOTE — PROGRESS NOTE ADULT - ASSESSMENT
74 y/o Female w/ DM, CVA, HTN admitted for respiratory  failure s/p tracheostomy on 9/1/2023, bacteremia. ENT consulted for L OE 9/5. Requiring serial debridements, wick removed 9/13/2023, ear debrided, removed some debri with suction this morning, able to see TM and noted watery thin discharge on the canal. External conchal bowl with slightly worsening necrotic tissue and erythema extends to the opening ear canal. Malignant otitis externa cannot be rule out based on the clinical exam finding. Discussed with primary team (NP and fellow), and ID yesterday the importance of getting CTTB to further evaluate. The importance of image study reemphasized again this morning to primary team (PA).     Culture from 9/6/2023 with no growth. Patient started on heparin drip for acute left lower popliteal DVT on 9/11/2023 by primary team. + MRSA on mupirocin. Patient was febrile to 105 overnight, H/H dropped to 6.4/19.8 s/p PRBC x1 unit. Patient is currently on meropenem per ID.     CTTB w/o contrast done yesterday shows acute on chronic left-sided otitis externa and acute on chronic left-sided otomastoiditis. Superimposed left-sided tympanosclerosis. Superimposed cholesteatoma formation within the left   tympanomastoid cavity cannot be excluded. No evidence of malignant otitis externa.    Left ear Conchal bowl debrided at bedside this morning, there is pink health tissue underneath the black tissue and scabes. EAC looks wet with some thin clear drainage removed via suction. TM intact but thickened. No mastoid tenderness or erythema    72 y/o Female w/ DM, CVA, HTN admitted for respiratory  failure s/p tracheostomy on 9/1/2023, bacteremia. ENT consulted for L OE 9/5. Requiring serial debridements, wick removed 9/13/2023, ear debrided, removed some debri with suction this morning, able to see TM and noted watery thin discharge on the canal. External conchal bowl with slightly worsening necrotic tissue and erythema extends to the opening ear canal. Malignant otitis externa cannot be rule out based on the clinical exam finding. Discussed with primary team (NP and fellow), and ID yesterday the importance of getting CTTB to further evaluate. The importance of image study reemphasized again this morning to primary team (PA).     Culture from 9/6/2023 with no growth. Patient started on heparin drip for acute left lower popliteal DVT on 9/11/2023 by primary team. + MRSA on mupirocin. H/H dropped to 6.4/19.8 s/p PRBC x1 unit. Patient is currently on meropenem per ID.     CTTB w/o contrast done yesterday shows acute on chronic left-sided otitis externa and acute on chronic left-sided otomastoiditis. Superimposed left-sided tympanosclerosis. Superimposed cholesteatoma formation within the left   tympanomastoid cavity cannot be excluded. No evidence of malignant otitis externa.    Left ear Conchal bowl debrided at bedside this morning, there is pink health tissue underneath the black tissue and scabes. EAC looks wet with some thin clear drainage removed via suction. TM intact but thickened. No mastoid tenderness or erythema

## 2023-09-13 NOTE — PROGRESS NOTE ADULT - ASSESSMENT
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 family declined renal biopsy started on prednisone  s/p trach 9/1 and BAL with MSSA   ET cx with ESBL and MSSA  started on ana cristina 9/1  blood cx 9/1: MSSA   repeat negative 9/4, 9/8  CXR with b/l opacities, R increased  L ear edema and otitis externa    initial  resp failure for ?fluid ovrer load but also had MSSA in the sputum, got vanco, aztreonam one day then cefepime and had rash, renal failure which was considered due to cefepime and then received 2 days of cefazolin and then off, now with trach and bronc on 9/1 with MSSA bacteremia and BAL also MSSA  another ET cx with MSSA and ESBL E-coli  hypotension, MSSA bacteremia likely due to pneumonia, repeat blood cx negative 9/4, TTE limited unable to exclude endocarditis  L otitis externa on ana cristina since 9/1 but worsening edema and black discoloration with bullae forming and persistent fever (ear cx was negative)  Ct showed acute on chronic otitis externa and otomastoiditis , superimposed cholesteatoma can not be excluded, no malignant otitis externa  * s/p debridement of necrotic tissue by ENT  * c/w ana cristina  * cannot do cefazolin so will have to treat with vanco  * vanco evel 21 today, check another level tomorrow and will continue vanco per level  * will need a 4 week course of vanco for the MSSA bacteremia from the negative blood cx through 10/2  * monitor CBC/diff and renal function    The above assessment and plan was discussed with the primary team    Yumiko Conner MD  contact on teams  After 5pm and on weekends call [phone]

## 2023-09-13 NOTE — CHART NOTE - NSCHARTNOTEFT_GEN_A_CORE
Notified by RN for a critical lab. Hemoglobin returned at 6.4. No signs of active bleeding at this time. Will continue Heparin drip for DVT. Patient is hemodynamically stable. Will continue to monitor for signs/ symptoms of bleeding. Notified by RN for a critical lab. Hemoglobin returned at 6.4. Patient received 1 unit of PRBCs. No signs of active bleeding at this time. Will continue Heparin drip for DVT. Patient is hemodynamically stable. Will continue to monitor for signs/ symptoms of bleeding.

## 2023-09-13 NOTE — PROGRESS NOTE ADULT - ASSESSMENT
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 While in MICU patient noted with worsening renal failure requiring HD initiation, CGE/ Melena s/p EGD 8/31 found with esophagitis and erosive gastropathy, new right cerebellar infarct and fevers second to ESBL COLI and MSSA VAP, MSSA bacteremia, left ear otitis externa and drug rxn to cephalosporins Course further complicated by prolonged vent time s/p tracheostomy 9/1 and transferred to RCU 9/3 completed by recurrent fevers with high PIP, respiratory acidosis and concern for volume overloaded.     NEUROLOGY  # Metabolic Encephalopath vs NEW cerebella infarct   - Baseline MS AOX3 with short term memory changes per family however noted with concern for poor mentation in ICU  - MRI (8/17) with NEW right cerebellar infarct and old left PCA/occipital infarcts  - STEPHANIE (8/17) with AV showing two linear mobile echogenic elements attached to valve leaflets consistent with Lambel's excrescence, but no TRINITY thrombus, and lambel's excrescence with uncertain clinical implication.   - EEG (8/11-8/15) with no seizures   - ILR interrogation (9/7) with SR and PACs falsely recorded as AF.   - Attempted to resume ASA for medical management but held given GIB   - Continue on lipitor for medical management   - Course complicated by questionable head and body shaking 9/8 however prolactin elevated and shakes head yes/no to some questions.   - Lethargy more likely second to respiratory acidosis and will hold on RPT EEG    - Monitor mentation closely Awakens to verbal stimuli     CARDIOVASCULAR  # HTN Urgency   # Septic vs vasoplegic shock   - Labile BPs ranging in between SBP 80s-200s and attempted labetalol, however noted with hypotension and bradycardia likely from BB toxicity vs shock state?    - Holding Labetalol and Catapres   - Continue on Cardura for now however if BP soft preventing fluid removal then hold Cardura     # Hx of HFpEF with likely ADHF with volume overload.   - ECHO (7/2023) with EF 59 with severe LVH and diastolic dysfunction   - STEPHANIE (8/18) with normal LVRVSF however noted with increased LA pressures and persistent LA septal bowing into RA.   - ECHO (8/11) with EF 60 with mild LVH, normal LVRVSF, and mild ddfxn.  - Remove volume with HD sessions and intermittent bumex pushes as BP allows       HEENT   # Left ear OE   - ENT evaluation (9/7) with no mastoid tenderness, however found with positive swelling and excoriation to left auricle and tenderness to manipulation of auricle. Debrided crusting of auricle and EAC evaluated with edema and unable to visualize TM. EAC debrided and found with watery exudate.   - Continue on CiproHC (9/5 - )   - Continue on Bradford (9/1 - ) as below   - ENT following and ear wick change QD   - Wick out but needs ? debridement wound care called/fu ent   - ENT recommending CT IAC w/ IV con but family is refusing as concerned given CKD, kidneys wouldn't recover from IV contrast. Spoke with Nephrology, ENT, and patient's  at length. Planned for CT non con and per , decision will be made from there but for now doesn't want to risk further harming kidneys.    # Oral Lesions with questionable Zoster vs Trauma?   - Patient with tongue pain and seen with anterior ulcerations consolidating and crusting and posterior ulceration.  - Case discussed with pathology resident and culture/ cytology sent.   - HSV negative and Path (9/6) with normal appearing epithelial cells singly and in clusters devoid of viral cytopathic effect.   - Continue on magic mouth wash for pain    RESPIRATORY  # AHHRF second to volume overload   - Hx of CKD and HFpEF presented with SOB and hypercarbia second to volume overload requiring intubation and HD initiation   - Vent weaning attempted however limited requiring tracheostomy (9/1) with IP   - Course complicated by PIPs elevated (50s) and respiratory acidosis second to secretions vs volume ?    - Vent adjusted 20/300/5/30 without improvement.   - POCUS (9/8) with BL pleural effusions (large on right and small on left)   - CT CHEST (9/8) with TBM, BL pleural effusions and continued consolidations   - Continue on vent and given TBM increased PEEP (9/9) to 20/300/8/40 with overall improvement   - Continue on duonebs and HTS for airway clearance   - Continue volume removal with diuresis and aggressive UF sessions.   - Discussing possible thora but appears improved and will monitor.  ]  - Bedside US showing decrease in pleural effusion - hold off thoracentesis 9/10     GI  # UGIB   - CGE x 1 episode on 8/24 and melena x 2 episodes on 8/31 likely residual blood.   - EGD (8/31) found with esophagitis and erosive gastropathy  - Continue on PPI BID through late October and then PPI QD   - No melena     # Dysphagia   - NGT-TF continued   - Pending PEG however needs improvement in infectious status prior to placement.     RENAL  # Progressive CKD   - Presented volume overload and progressive RUSS on CKD (baseline CRE in 2s and mode into the 3s on admission) likely obstruction vs low flow state with shock vs AIN from drug reaction issue?  - POCUS with no signs of hydronephrosis   - Pressor support started with improvement in renal function  - Attempted steroid course in ICU without improvement in renal function   - Case discussed at length with renal and initiated on HD in ICU and now continued on HD TTHS, however remains volume overloaded.   - Patient oliguric however responds to Bumex pushes   - Continue on aggressive UF sessions with HD TTHS and bumex pushes as BP allows   - Monitor renal function and UOP     # Hyperphosphatemia   - PTH normal and elevated phos likely from renal failure  - Phos binder with Renvela started with improvement.     INFECTIOUS DISEASE  # ESBL ECOLI and MSSA VAP c/b MSSA bacteremia   - RVP/ COVID (8/11) negative   - SCx (8/11) with MSSA s/p cefepime (8/12-8/13) and then ancef (8/14) however noted with drug reaction and then changed to vanco and aztreonam (8/12-8/13) in ICU .   - Course complicated by recurrent fevers and return of MSSA with bacteremia.   - SCx (9/1) with MSSA and ESBL ECOLI   - BAL (9/1) with MSSA   - BCx (9/1) with MSSA and cleared on (9/4)   - ECHO (9/6) unable to rule out endocarditis   - Continue on Bradford (9/4 - ) and Vanco BY DOSE POST HD (9/4, 9/7, 9/9 ...) with duration TBD at this time.   - Given inability to rule out endocarditis will discuss possible 4 wks?   - Course complicated by fever (9/7) and UA with leuks but no bacteria, however compared to prior improved, RVP/COVID and SCx negative, and RPT CXR similar to prior   - Pending BCx, SCx, UCx, and LE DOPPLERS- Blood and sputum cx NTD; Acute left deep vein thrombosis of the left popliteal vein.  - Febrile overnight 102 recultured and sent off   -Pt receiving vanco post HD however today given vanco 750mg x 1 will check Vanco level at end of HD session and dose   -     # Left ear OE   - ENT evaluation (9/7) with no mastoid tenderness, however found with positive swelling and excoriation to left auricle and tenderness to manipulation of auricle. Debrided crusting of auricle and EAC evaluated with edema and unable to visualize TM. EAC debrided and found with watery exudate.   - Continue on CiproHC (9/5 - )   - Continue on Bradford (9/1 - ) as below   - Given fevers pending CT TEMPORAL BONE - CT head done Auditory  CT ordered to better eval temporal bone /ear     # MRSA PCRs   - MRSA PCR (8/11 and 8/14) negative   - Next MRSA + PCR ordered for 10/8     HEME  # Anemia second to GIB vs renal disease   - Hold chemical DVT PPX given bleeding/ waxing and waning HH   - s/p multiple units of PRBCs (8/15, 8/21, 8/28, 8/31 and 9/8)   - Anemia panel with AOCD but with low %sat and ferrous sulfate added to optimize   - Monitor HH and discuss with renal for EPO sometime next week.   - SPoke with GI for clearance to start Heparin gtt for + DVT     Vascular  # DVT  - Pt with + popliteal DVT on SCD Spoke with GI no absolute contraindication for starting Heparin - advise close monitoring for bleeding - Do stat CTA if bleeding occurs and call IR   - Pt specific heparin gtt started with goal PTT 60-90      ONC   # Hx of Breast CA   - Patient dx in 2018 and s/p BL mastectomy (radical on right) and chemo and RT.   - Supportive care.     ENDOCRINE  # IDDM2   - Continue on NPH 3U and ISS   - Monitor FS and adjust as needed     LINES/ TUBES  - R ARTHUR TAYLOR (8/19 - )     SKIN  # Drug Eruption   - Attempted cefepime (8/12) vs ancef (8/13-8/14) and then noted with drug rxn.   - Hold further cephalosporins at this time   - Continue on steroids with prednisone 40 (8/18 - 9/2) then prednisone 30 (9/3-9/7), then prednisone 20 (9/8 - 9/10), then prednisone 10mg (9/11-9/13) then turn off.     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 While in MICU patient noted with worsening renal failure requiring HD initiation, CGE/ Melena s/p EGD 8/31 found with esophagitis and erosive gastropathy, new right cerebellar infarct and fevers second to ESBL COLI and MSSA VAP, MSSA bacteremia, left ear otitis externa and drug rxn to cephalosporins Course further complicated by prolonged vent time s/p tracheostomy 9/1 and transferred to RCU 9/3 completed by recurrent fevers with high PIP, respiratory acidosis and concern for volume overloaded.     NEUROLOGY  # Metabolic Encephalopath vs NEW cerebella infarct   - Baseline MS AOX3 with short term memory changes per family however noted with concern for poor mentation in ICU  - MRI (8/17) with NEW right cerebellar infarct and old left PCA/occipital infarcts  - STEPHANIE (8/17) with AV showing two linear mobile echogenic elements attached to valve leaflets consistent with Lambel's excrescence, but no TRINITY thrombus, and lambel's excrescence with uncertain clinical implication.   - EEG (8/11-8/15) with no seizures   - ILR interrogation (9/7) with SR and PACs falsely recorded as AF.   - Attempted to resume ASA for medical management but held given GIB   - Continue on lipitor for medical management   - Course complicated by questionable head and body shaking 9/8 however prolactin elevated and shakes head yes/no to some questions.   - Lethargy more likely second to respiratory acidosis and will hold on RPT EEG    - Monitor mentation closely Awakens to verbal stimuli     CARDIOVASCULAR  # HTN Urgency   # Septic vs vasoplegic shock   - Labile BPs ranging in between SBP 80s-200s and attempted labetalol, however noted with hypotension and bradycardia likely from BB toxicity vs shock state?    - Holding Labetalol and Catapres   - Continue on Cardura for now however if BP soft preventing fluid removal then hold Cardura     # Hx of HFpEF with likely ADHF with volume overload.   - ECHO (7/2023) with EF 59 with severe LVH and diastolic dysfunction   - STEPHANIE (8/18) with normal LVRVSF however noted with increased LA pressures and persistent LA septal bowing into RA.   - ECHO (8/11) with EF 60 with mild LVH, normal LVRVSF, and mild ddfxn.  - Remove volume with HD sessions and intermittent bumex pushes as BP allows    - Bumex 1mg IV ordered 9/13- Monitor UOP    HEENT   # Left ear OE   - ENT evaluation (9/7) with no mastoid tenderness, however found with positive swelling and excoriation to left auricle and tenderness to manipulation of auricle. Debrided crusting of auricle and EAC evaluated with edema and unable to visualize TM. EAC debrided and found with watery exudate.   - Continue on CiproHC (9/5 - )   - Continue on Bradford (9/1 - ) as below   - ENT following and ear wick change QD   - Wick out but needs ? debridement wound care called/fu ent   - ENT recommending CT IAC w/ IV con but family is refusing as concerned given CKD, kidneys wouldn't recover from IV contrast. Spoke with Nephrology, ENT, and patient's  at length. Planned for CT non con and per , decision will be made from there but for now doesn't want to risk further harming kidneys.  - CT IAC showing acute on chronic left-sided otitis externa and acute on chronic left-sided otomastoiditis. Superimposed left-sided tympanosclerosis. Superimposed cholesteatoma formation within the left   tympanomastoid cavity cannot be excluded. No evidence of malignant otitis externa.    # Oral Lesions with questionable Zoster vs Trauma?   - Patient with tongue pain and seen with anterior ulcerations consolidating and crusting and posterior ulceration.  - Case discussed with pathology resident and culture/ cytology sent.   - HSV negative and Path (9/6) with normal appearing epithelial cells singly and in clusters devoid of viral cytopathic effect.   - Continue on magic mouth wash for pain    RESPIRATORY  # AHHRF second to volume overload   - Hx of CKD and HFpEF presented with SOB and hypercarbia second to volume overload requiring intubation and HD initiation   - Vent weaning attempted however limited requiring tracheostomy (9/1) with IP   - Course complicated by PIPs elevated (50s) and respiratory acidosis second to secretions vs volume ?    - Vent adjusted 20/300/5/30 without improvement.   - POCUS (9/8) with BL pleural effusions (large on right and small on left)   - CT CHEST (9/8) with TBM, BL pleural effusions and continued consolidations   - Continue on vent and given TBM increased PEEP (9/9) to 20/300/8/40 with overall improvement   - Continue on duonebs and HTS for airway clearance   - Continue volume removal with diuresis and aggressive UF sessions.   - Discussing possible thora but appears improved and will monitor.  ]  - Bedside US showing decrease in pleural effusion - hold off thoracentesis 9/10     GI  # UGIB   - CGE x 1 episode on 8/24 and melena x 2 episodes on 8/31 likely residual blood.   - EGD (8/31) found with esophagitis and erosive gastropathy  - Continue on PPI BID through late October and then PPI QD   - No melena     # Dysphagia   - NGT-TF continued   - Pending PEG however needs improvement in infectious status prior to placement.     RENAL  # Progressive CKD   - Presented volume overload and progressive RUSS on CKD (baseline CRE in 2s and mode into the 3s on admission) likely obstruction vs low flow state with shock vs AIN from drug reaction issue?  - POCUS with no signs of hydronephrosis   - Pressor support started with improvement in renal function  - Attempted steroid course in ICU without improvement in renal function   - Case discussed at length with renal and initiated on HD in ICU and now continued on HD TTHS, however remains volume overloaded.   - Patient oliguric however responds to Bumex pushes   - Continue on aggressive UF sessions with HD TTHS and bumex pushes as BP allows   - Monitor renal function and UOP     # Hyperphosphatemia   - PTH normal and elevated phos likely from renal failure  - Phos binder with Renvela started with improvement.     INFECTIOUS DISEASE  # ESBL ECOLI and MSSA VAP c/b MSSA bacteremia   - RVP/ COVID (8/11) negative   - SCx (8/11) with MSSA s/p cefepime (8/12-8/13) and then ancef (8/14) however noted with drug reaction and then changed to vanco and aztreonam (8/12-8/13) in ICU .   - Course complicated by recurrent fevers and return of MSSA with bacteremia.   - SCx (9/1) with MSSA and ESBL ECOLI   - BAL (9/1) with MSSA   - BCx (9/1) with MSSA and cleared on (9/4)   - ECHO (9/6) unable to rule out endocarditis   - Continue on Bradford (9/4 - ) and Vanco BY DOSE POST HD (9/4, 9/7, 9/9 ...) with duration TBD at this time.   - Given inability to rule out endocarditis will discuss possible 4 wks?   - Course complicated by fever (9/7) and UA with leuks but no bacteria, however compared to prior improved, RVP/COVID and SCx negative, and RPT CXR similar to prior   - Pending BCx, SCx, UCx, and LE DOPPLERS- Blood and sputum cx NTD; Acute left deep vein thrombosis of the left popliteal vein.  - Febrile overnight 102 recultured and sent off   -Pt receiving vanco post HD however today given vanco 750mg x 1 will check Vanco level at end of HD session and dose   -     # Left ear OE   - ENT evaluation (9/7) with no mastoid tenderness, however found with positive swelling and excoriation to left auricle and tenderness to manipulation of auricle. Debrided crusting of auricle and EAC evaluated with edema and unable to visualize TM. EAC debrided and found with watery exudate.   - Continue on CiproHC (9/5 - )   - Continue on Bradford (9/1 - ) as below     # MRSA PCRs   - MRSA PCR (8/11 and 8/14) negative   - Next MRSA + PCR ordered for 10/8     HEME  # Anemia second to GIB vs renal disease   - Hold chemical DVT PPX given bleeding/ waxing and waning HH   - s/p multiple units of PRBCs (8/15, 8/21, 8/28, 8/31 and 9/8)   - Anemia panel with AOCD but with low %sat and ferrous sulfate added to optimize   - Monitor HH and discuss with renal for EPO sometime next week.   - SPoke with GI for clearance to start Heparin gtt for + DVT     Vascular  # DVT  - Pt with + popliteal DVT on SCD Spoke with GI no absolute contraindication for starting Heparin - advise close monitoring for bleeding - Do stat CTA if bleeding occurs and call IR   - Pt specific heparin gtt started with goal PTT 60-85      ONC   # Hx of Breast CA   - Patient dx in 2018 and s/p BL mastectomy (radical on right) and chemo and RT.   - Supportive care.     ENDOCRINE  # IDDM2   - Continue on NPH 3U and ISS   - Monitor FS and adjust as needed     LINES/ TUBES  - R ARTHUR TAYLOR (8/19 - )     SKIN  # Drug Eruption   - Attempted cefepime (8/12) vs ancef (8/13-8/14) and then noted with drug rxn.   - Hold further cephalosporins at this time   - Continue on steroids with prednisone 40 (8/18 - 9/2) then prednisone 30 (9/3-9/7), then prednisone 20 (9/8 - 9/10), then prednisone 10mg (9/11-9/13) then turn off.     ETHICS/ GOC    - FULL CODE     DISPO - TBD

## 2023-09-13 NOTE — PROGRESS NOTE ADULT - ASSESSMENT
72 y/o F DM on insulin, HTN, CKD,breast CA, respiratory arrest and cardiac arrest (2018), CVA with residual weakness, aspiration PNA h/o trache, PEG, both removed presents with respiratory distress requiring intubation. 97.8

## 2023-09-14 NOTE — PROGRESS NOTE ADULT - ASSESSMENT
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 family declined renal biopsy started on prednisone  s/p trach 9/1 and BAL with MSSA   ET cx with ESBL and MSSA  started on ana cristina 9/1  blood cx 9/1: MSSA   repeat negative 9/4, 9/8  CXR with b/l opacities, R increased  L ear edema and otitis externa    initial  resp failure for ?fluid ovrer load but also had MSSA in the sputum, got vanco, aztreonam one day then cefepime and had rash, renal failure which was considered due to cefepime and then received 2 days of cefazolin and then off, now with trach and bronc on 9/1 with MSSA bacteremia and BAL also MSSA  another ET cx with MSSA and ESBL E-coli  hypotension, MSSA bacteremia likely due to pneumonia, repeat blood cx negative 9/4, TTE limited unable to exclude endocarditis  L otitis externa on ana cristina since 9/1 but worsening edema and black discoloration with bullae forming and persistent fever (ear cx was negative)  Ct showed acute on chronic otitis externa and otomastoiditis , superimposed cholesteatoma can not be excluded, no malignant otitis externa  * s/p debridement of necrotic tissue by ENT  * c/w ana cristina  * cannot do cefazolin so will have to treat with vanco  *  check another level tomorrow and will continue vanco per level, if there is a regular schedule for the HD then start 500 post HD  * will need a 4 week course of vanco for the MSSA bacteremia from the negative blood cx through 10/2  * monitor CBC/diff and renal function    The above assessment and plan was discussed with the primary team    Yumiko Conner MD  contact on teams  After 5pm and on weekends call [phone]

## 2023-09-14 NOTE — PROGRESS NOTE ADULT - SUBJECTIVE AND OBJECTIVE BOX
CHIEF COMPLAINT: Patient is a 73y old  Female who presents with a chief complaint of Respiratory distress (13 Sep 2023 17:36)      Interval Events:    REVIEW OF SYSTEMS:  [ ] All other systems negative  [ ] Unable to assess ROS because ________    Mode: AC/ CMV (Assist Control/ Continuous Mandatory Ventilation), RR (machine): 20, TV (machine): 300, FiO2: 30, PEEP: 8, ITime: 0.53, MAP: 15, PIP: 33      OBJECTIVE:  ICU Vital Signs Last 24 Hrs  T(C): 36.9 (14 Sep 2023 03:40), Max: 37.4 (13 Sep 2023 23:40)  T(F): 98.5 (14 Sep 2023 03:40), Max: 99.4 (13 Sep 2023 23:40)  HR: 102 (14 Sep 2023 03:55) (92 - 105)  BP: 130/44 (14 Sep 2023 03:40) (110/39 - 140/51)  BP(mean): --  ABP: --  ABP(mean): --  RR: 20 (14 Sep 2023 03:40) (20 - 24)  SpO2: 100% (14 Sep 2023 03:55) (96% - 100%)    O2 Parameters below as of 14 Sep 2023 03:55  Patient On (Oxygen Delivery Method): ventilator          Mode: AC/ CMV (Assist Control/ Continuous Mandatory Ventilation), RR (machine): 20, TV (machine): 300, FiO2: 30, PEEP: 8, ITime: 0.53, MAP: 15, PIP: 33    09-13 @ 07:01  -  09-14 @ 07:00  --------------------------------------------------------  IN: 1207 mL / OUT: 390 mL / NET: 817 mL      CAPILLARY BLOOD GLUCOSE      POCT Blood Glucose.: 246 mg/dL (14 Sep 2023 05:54)      HOSPITAL MEDICATIONS:  MEDICATIONS  (STANDING):  albuterol/ipratropium for Nebulization 3 milliLiter(s) Nebulizer every 6 hours  atorvastatin 40 milliGRAM(s) Oral at bedtime  chlorhexidine 0.12% Liquid 15 milliLiter(s) Oral Mucosa every 12 hours  chlorhexidine 2% Cloths 1 Application(s) Topical daily  ciprofloxacin/hydrocortisone Suspension Otic 4 Drop(s) Left Ear two times a day  dextrose 5%. 1000 milliLiter(s) (50 mL/Hr) IV Continuous <Continuous>  dextrose 5%. 1000 milliLiter(s) (100 mL/Hr) IV Continuous <Continuous>  dextrose 50% Injectable 12.5 Gram(s) IV Push once  dextrose 50% Injectable 25 Gram(s) IV Push once  dextrose 50% Injectable 25 Gram(s) IV Push once  doxazosin 6 milliGRAM(s) Oral <User Schedule>  epoetin odalis (EPOGEN) Injectable 85314 Unit(s) SubCutaneous <User Schedule>  ferrous    sulfate Liquid 300 milliGRAM(s) Enteral Tube daily  FIRST- Mouthwash  BLM 10 milliLiter(s) Swish and Spit four times a day  glucagon  Injectable 1 milliGRAM(s) IntraMuscular once  heparin  Infusion 1000 Unit(s)/Hr (8.5 mL/Hr) IV Continuous <Continuous>  insulin lispro (ADMELOG) corrective regimen sliding scale   SubCutaneous every 6 hours  insulin NPH human recombinant 3 Unit(s) SubCutaneous every 6 hours  meropenem  IVPB 500 milliGRAM(s) IV Intermittent every 12 hours  mupirocin 2% Ointment 1 Application(s) Both Nostrils two times a day  mupirocin 2% Ointment 1 Application(s) Topical two times a day  pantoprazole  Injectable 40 milliGRAM(s) IV Push two times a day  psyllium Powder 1 Packet(s) Oral daily  sevelamer carbonate Powder 800 milliGRAM(s) Oral three times a day  sodium chloride 3%  Inhalation 4 milliLiter(s) Inhalation every 6 hours    MEDICATIONS  (PRN):  acetaminophen   Oral Liquid .. 650 milliGRAM(s) Oral every 6 hours PRN Temp greater or equal to 38C (100.4F), Mild Pain (1 - 3)  dextrose Oral Gel 15 Gram(s) Oral once PRN Blood Glucose LESS THAN 70 milliGRAM(s)/deciliter      LABS:                        7.8    8.52  )-----------( 195      ( 13 Sep 2023 10:05 )             24.1     09-13    124<L>  |  88<L>  |  84<H>  ----------------------------<  151<H>  4.4   |  23  |  2.54<H>    Ca    8.0<L>      13 Sep 2023 02:00  Phos  3.7     09-13  Mg     2.40     09-13      PTT - ( 13 Sep 2023 12:00 )  PTT:63.5 sec  Urinalysis Basic - ( 13 Sep 2023 02:00 )    Color: x / Appearance: x / SG: x / pH: x  Gluc: 151 mg/dL / Ketone: x  / Bili: x / Urobili: x   Blood: x / Protein: x / Nitrite: x   Leuk Esterase: x / RBC: x / WBC x   Sq Epi: x / Non Sq Epi: x / Bacteria: x            PAST MEDICAL & SURGICAL HISTORY:  Breast CA      Diabetes      Stroke      Cardiac arrest      HTN (hypertension)      H/O mastectomy, bilateral      FAMILY HISTORY:  No pertinent family history in first degree relatives      Social History:      RADIOLOGY:  [ ] Reviewed and interpreted by me    PULMONARY FUNCTION TESTS:    EKG: auscultated CHIEF COMPLAINT: Patient is a 73y old  Female who presents with a chief complaint of Respiratory distress.      Interval Events: Pt w/ downtrending sodium and uptrending creatinine. Plan to give IV bumex and start on salt tabs/hypertonic saline.      REVIEW OF SYSTEMS:  [x] Unable to assess ROS because vented.      Mode: AC/ CMV (Assist Control/ Continuous Mandatory Ventilation), RR (machine): 20, TV (machine): 300, FiO2: 30, PEEP: 8, ITime: 0.53, MAP: 15, PIP: 33      OBJECTIVE:  ICU Vital Signs Last 24 Hrs  T(C): 36.9 (14 Sep 2023 03:40), Max: 37.4 (13 Sep 2023 23:40)  T(F): 98.5 (14 Sep 2023 03:40), Max: 99.4 (13 Sep 2023 23:40)  HR: 102 (14 Sep 2023 03:55) (92 - 105)  BP: 130/44 (14 Sep 2023 03:40) (110/39 - 140/51)  BP(mean): --  ABP: --  ABP(mean): --  RR: 20 (14 Sep 2023 03:40) (20 - 24)  SpO2: 100% (14 Sep 2023 03:55) (96% - 100%)    O2 Parameters below as of 14 Sep 2023 03:55  Patient On (Oxygen Delivery Method): ventilator          Mode: AC/ CMV (Assist Control/ Continuous Mandatory Ventilation), RR (machine): 20, TV (machine): 300, FiO2: 30, PEEP: 8, ITime: 0.53, MAP: 15, PIP: 33    09-13 @ 07:01  -  09-14 @ 07:00  --------------------------------------------------------  IN: 1207 mL / OUT: 390 mL / NET: 817 mL      CAPILLARY BLOOD GLUCOSE      POCT Blood Glucose.: 246 mg/dL (14 Sep 2023 05:54)      HOSPITAL MEDICATIONS:  MEDICATIONS  (STANDING):  albuterol/ipratropium for Nebulization 3 milliLiter(s) Nebulizer every 6 hours  atorvastatin 40 milliGRAM(s) Oral at bedtime  chlorhexidine 0.12% Liquid 15 milliLiter(s) Oral Mucosa every 12 hours  chlorhexidine 2% Cloths 1 Application(s) Topical daily  ciprofloxacin/hydrocortisone Suspension Otic 4 Drop(s) Left Ear two times a day  dextrose 5%. 1000 milliLiter(s) (50 mL/Hr) IV Continuous <Continuous>  dextrose 5%. 1000 milliLiter(s) (100 mL/Hr) IV Continuous <Continuous>  dextrose 50% Injectable 12.5 Gram(s) IV Push once  dextrose 50% Injectable 25 Gram(s) IV Push once  dextrose 50% Injectable 25 Gram(s) IV Push once  doxazosin 6 milliGRAM(s) Oral <User Schedule>  epoetin odalis (EPOGEN) Injectable 17644 Unit(s) SubCutaneous <User Schedule>  ferrous    sulfate Liquid 300 milliGRAM(s) Enteral Tube daily  FIRST- Mouthwash  BLM 10 milliLiter(s) Swish and Spit four times a day  glucagon  Injectable 1 milliGRAM(s) IntraMuscular once  heparin  Infusion 1000 Unit(s)/Hr (8.5 mL/Hr) IV Continuous <Continuous>  insulin lispro (ADMELOG) corrective regimen sliding scale   SubCutaneous every 6 hours  insulin NPH human recombinant 3 Unit(s) SubCutaneous every 6 hours  meropenem  IVPB 500 milliGRAM(s) IV Intermittent every 12 hours  mupirocin 2% Ointment 1 Application(s) Both Nostrils two times a day  mupirocin 2% Ointment 1 Application(s) Topical two times a day  pantoprazole  Injectable 40 milliGRAM(s) IV Push two times a day  psyllium Powder 1 Packet(s) Oral daily  sevelamer carbonate Powder 800 milliGRAM(s) Oral three times a day  sodium chloride 3%  Inhalation 4 milliLiter(s) Inhalation every 6 hours    MEDICATIONS  (PRN):  acetaminophen   Oral Liquid .. 650 milliGRAM(s) Oral every 6 hours PRN Temp greater or equal to 38C (100.4F), Mild Pain (1 - 3)  dextrose Oral Gel 15 Gram(s) Oral once PRN Blood Glucose LESS THAN 70 milliGRAM(s)/deciliter      LABS:                        7.8    8.52  )-----------( 195      ( 13 Sep 2023 10:05 )             24.1     09-13    124<L>  |  88<L>  |  84<H>  ----------------------------<  151<H>  4.4   |  23  |  2.54<H>    Ca    8.0<L>      13 Sep 2023 02:00  Phos  3.7     09-13  Mg     2.40     09-13      PTT - ( 13 Sep 2023 12:00 )  PTT:63.5 sec  Urinalysis Basic - ( 13 Sep 2023 02:00 )    Color: x / Appearance: x / SG: x / pH: x  Gluc: 151 mg/dL / Ketone: x  / Bili: x / Urobili: x   Blood: x / Protein: x / Nitrite: x   Leuk Esterase: x / RBC: x / WBC x   Sq Epi: x / Non Sq Epi: x / Bacteria: x      PAST MEDICAL & SURGICAL HISTORY:  Breast CA      Diabetes      Stroke      Cardiac arrest      HTN (hypertension)      H/O mastectomy, bilateral      FAMILY HISTORY:  No pertinent family history in first degree relatives      Social History:      RADIOLOGY:  [ ] Reviewed and interpreted by me    PULMONARY FUNCTION TESTS:    EKG:

## 2023-09-14 NOTE — PROGRESS NOTE ADULT - SUBJECTIVE AND OBJECTIVE BOX
Subjective: Patient seen and examined. No new events except as noted.     REVIEW OF SYSTEMS:  Unable to obtain       MEDICATIONS:  MEDICATIONS  (STANDING):  albuterol/ipratropium for Nebulization 3 milliLiter(s) Nebulizer every 6 hours  atorvastatin 40 milliGRAM(s) Oral at bedtime  buMETAnide Injectable 2 milliGRAM(s) IV Push once  chlorhexidine 0.12% Liquid 15 milliLiter(s) Oral Mucosa every 12 hours  chlorhexidine 2% Cloths 1 Application(s) Topical daily  ciprofloxacin/hydrocortisone Suspension Otic 4 Drop(s) Left Ear two times a day  dextrose 5%. 1000 milliLiter(s) (50 mL/Hr) IV Continuous <Continuous>  dextrose 5%. 1000 milliLiter(s) (100 mL/Hr) IV Continuous <Continuous>  dextrose 50% Injectable 12.5 Gram(s) IV Push once  dextrose 50% Injectable 25 Gram(s) IV Push once  dextrose 50% Injectable 25 Gram(s) IV Push once  doxazosin 6 milliGRAM(s) Oral <User Schedule>  epoetin odalis (EPOGEN) Injectable 25625 Unit(s) SubCutaneous <User Schedule>  ferrous    sulfate Liquid 300 milliGRAM(s) Enteral Tube daily  FIRST- Mouthwash  BLM 10 milliLiter(s) Swish and Spit four times a day  glucagon  Injectable 1 milliGRAM(s) IntraMuscular once  heparin  Infusion 1000 Unit(s)/Hr (9 mL/Hr) IV Continuous <Continuous>  insulin lispro (ADMELOG) corrective regimen sliding scale   SubCutaneous every 6 hours  insulin NPH human recombinant 3 Unit(s) SubCutaneous every 6 hours  meropenem  IVPB 500 milliGRAM(s) IV Intermittent every 12 hours  mupirocin 2% Ointment 1 Application(s) Topical two times a day  mupirocin 2% Ointment 1 Application(s) Both Nostrils two times a day  pantoprazole  Injectable 40 milliGRAM(s) IV Push two times a day  psyllium Powder 1 Packet(s) Oral daily  sevelamer carbonate Powder 800 milliGRAM(s) Oral three times a day  sodium chloride 3%  Inhalation 4 milliLiter(s) Inhalation every 6 hours      PHYSICAL EXAM:  T(C): 36.9 (09-14-23 @ 03:40), Max: 37.4 (09-13-23 @ 23:40)  HR: 96 (09-14-23 @ 10:58) (92 - 105)  BP: 117/35 (09-14-23 @ 08:08) (110/39 - 140/51)  RR: 20 (09-14-23 @ 03:40) (20 - 24)  SpO2: 94% (09-14-23 @ 10:58) (94% - 100%)  Wt(kg): --  I&O's Summary    13 Sep 2023 07:01  -  14 Sep 2023 07:00  --------------------------------------------------------  IN: 1207 mL / OUT: 390 mL / NET: 817 mL            Appearance: NAD, +trach  HEENT: dry oral mucosa  Lymphatic: No lymphadenopathy  Cardiovascular: Normal S1 S2, No JVD, No murmurs, No edema  Respiratory: Decreased BS, + trach to vent	  Neuro: opens eyes  Gastrointestinal: Soft, Non-tender, + BS, + OGT	  Skin: No rashes, No ecchymoses, No cyanosis	  Extremities: No strength/ROM 2/2 sedation, + BL LE edema  Vascular: Peripheral pulses palpable 2+ bilaterally          LABS:    CARDIAC MARKERS:                                7.8    6.80  )-----------( 185      ( 14 Sep 2023 05:28 )             23.4     09-14    121<L>  |  88<L>  |  87<H>  ----------------------------<  235<H>  4.3   |  24  |  2.68<H>    Ca    8.0<L>      14 Sep 2023 05:28  Phos  3.5     09-14  Mg     2.50     09-14      proBNP:   Lipid Profile:   HgA1c:   TSH:             TELEMETRY: 	    ECG:  	  RADIOLOGY:   DIAGNOSTIC TESTING:  [ ] Echocardiogram:  [ ]  Catheterization:  [ ] Stress Test:    OTHER:

## 2023-09-14 NOTE — PROGRESS NOTE ADULT - SUBJECTIVE AND OBJECTIVE BOX
Follow Up:  MSSA bacteremia    Interval History: no more fevers, no acute events    ROS:    Unobtainable because: trached      Allergies  isoniazid (Rash)  nafcillin (Unknown)  hydrALAZINE (Rash)  vitamin E (Short breath; Urticaria; Hives)  doxycycline (Rash)  cefepime (Rash)  NIFEdipine (Urticaria; Hives)        ANTIMICROBIALS:  meropenem  IVPB 500 every 12 hours      OTHER MEDS:  acetaminophen   Oral Liquid .. 650 milliGRAM(s) Oral every 6 hours PRN  albuterol/ipratropium for Nebulization 3 milliLiter(s) Nebulizer every 6 hours  atorvastatin 40 milliGRAM(s) Oral at bedtime  chlorhexidine 0.12% Liquid 15 milliLiter(s) Oral Mucosa every 12 hours  chlorhexidine 2% Cloths 1 Application(s) Topical daily  ciprofloxacin/hydrocortisone Suspension Otic 4 Drop(s) Left Ear two times a day  dextrose 5%. 1000 milliLiter(s) IV Continuous <Continuous>  dextrose 5%. 1000 milliLiter(s) IV Continuous <Continuous>  dextrose 50% Injectable 12.5 Gram(s) IV Push once  dextrose 50% Injectable 25 Gram(s) IV Push once  dextrose 50% Injectable 25 Gram(s) IV Push once  dextrose Oral Gel 15 Gram(s) Oral once PRN  doxazosin 6 milliGRAM(s) Oral <User Schedule>  epoetin odalis (EPOGEN) Injectable 30037 Unit(s) SubCutaneous <User Schedule>  ferrous    sulfate Liquid 300 milliGRAM(s) Enteral Tube daily  FIRST- Mouthwash  BLM 10 milliLiter(s) Swish and Spit four times a day  glucagon  Injectable 1 milliGRAM(s) IntraMuscular once  heparin  Infusion 1000 Unit(s)/Hr IV Continuous <Continuous>  insulin lispro (ADMELOG) corrective regimen sliding scale   SubCutaneous every 6 hours  insulin NPH human recombinant 3 Unit(s) SubCutaneous every 6 hours  mupirocin 2% Ointment 1 Application(s) Both Nostrils two times a day  mupirocin 2% Ointment 1 Application(s) Topical two times a day  pantoprazole  Injectable 40 milliGRAM(s) IV Push two times a day  psyllium Powder 1 Packet(s) Oral daily  sevelamer carbonate Powder 800 milliGRAM(s) Oral three times a day  sodium chloride 2 Gram(s) Oral two times a day  sodium chloride 1.5%. 500 milliLiter(s) IV Continuous <Continuous>  sodium chloride 3%  Inhalation 4 milliLiter(s) Inhalation every 6 hours      Vital Signs Last 24 Hrs  T(C): 37.6 (14 Sep 2023 12:30), Max: 37.6 (14 Sep 2023 12:30)  T(F): 99.6 (14 Sep 2023 12:30), Max: 99.6 (14 Sep 2023 12:30)  HR: 95 (14 Sep 2023 16:34) (95 - 105)  BP: 127/49 (14 Sep 2023 12:30) (110/39 - 140/51)  BP(mean): --  RR: 20 (14 Sep 2023 12:30) (20 - 24)  SpO2: 96% (14 Sep 2023 16:34) (94% - 100%)    Parameters below as of 14 Sep 2023 16:34  Patient On (Oxygen Delivery Method): ventilator        Physical Exam:  General:    trached   ENT: L with improved edema, still some dislcoloration  Respiratory:   trach on vent  abd:   soft, not tender  :     no CVAT, no suprapubic tenderness, no iwllard  Musculoskeletal : no joint swelling  Skin:    no rash now  vascular: [facility] ilene                          7.8    6.80  )-----------( 185      ( 14 Sep 2023 05:28 )             23.4       14    122<L>  |  88<L>  |  88<H>  ----------------------------<  167<H>  4.3   |  23  |  2.74<H>    Ca    8.0<L>      14 Sep 2023 13:33  Phos  3.2       Mg     2.50             Urinalysis Basic - ( 14 Sep 2023 13:46 )    Color: Yellow / Appearance: Cloudy / S.015 / pH: x  Gluc: x / Ketone: Negative mg/dL  / Bili: Negative / Urobili: 0.2 mg/dL   Blood: x / Protein: 100 mg/dL / Nitrite: Negative   Leuk Esterase: Moderate / RBC: 11 /HPF / WBC 15 /HPF   Sq Epi: x / Non Sq Epi: x / Bacteria: x        MICROBIOLOGY:  v  .Blood Blood-Venous  09-10-23   No growth at 72 Hours  --  --      .Sputum Sputum  09-10-23   Normal Respiratory Cate present  --    Rare polymorphonuclear leukocytes per low power field  No Squamous epithelial cells per low power field  Rare Yeast like cells seen per oil power field  Rare Gram Positive Cocci in Clusters seen per oil power field      .Blood Blood-Peripheral  09-10-23   No growth at 4 days  --  --      .Sputum Sputum  23   Normal Respiratory Cate present  --    Numerous polymorphonuclear leukocytes per low power field  Numerous Squamous epithelial cells per low power field  Few Yeast like cells per oil power field  Results consistent with oropharyngeal contamination      .Blood Blood-Peripheral  23   No growth at 5 days  --  --      Ear Ear  23   No growth at 5 days  --  --      .Blood Blood-Peripheral  23   No growth at 5 days  --  --      .Bronchial Bronchial Lavage  23   Numerous Staphylococcus aureus Multiple Morphological Strains  Normal Respiratory Cate present  --  Staphylococcus aureus  Staphylococcus aureus      .Blood Blood-Peripheral  23   Growth in aerobic and anaerobic bottles: Staphylococcus aureus  Direct identification is available within approximately 3-5  hours either by Blood Panel Multiplexed PCR or Direct  MALDI-TOF. Details: [email or web address]  Growth in anaerobic bottle: blood culture bottle turned positive after  the final report was released.  --  Blood Culture PCR  Staphylococcus aureus      ET Tube ET Tube  23   Moderate Staphylococcus aureus  Moderate Escherichia coli ESBL  Normal Respiratory Cate present  --  Staphylococcus aureus  Escherichia coli ESBL      .Blood Blood-Peripheral  23   No growth at 5 days  --  --      .Blood Blood-Peripheral  23   No growth at 5 days  --  --      .Sputum Sputum  23   Normal Respiratory Cate present  --    Few polymorphonuclear leukocytes per low power field  Rare Squamous epithelial cells per low power field  Few Gram Positive Cocci in Clusters per oil power field          Rapid RVP Result: NotDetec ( @ 11:38)        RADIOLOGY:  Images independently visualized and reviewed personally, findings as below  < from: CT Internal Auditory Canals No Cont (23 @ 17:13) >  IMPRESSION:    1. Acute on chronic left-sided otitis externa and acute on chronic   left-sided otomastoiditis. 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 Superimposed cholesteatoma formation within the left   tympanomastoid cavity cannot be excluded. No evidence of malignant otitis   externa.    2. Chronic right-sided mastoiditis.      < end of copied text >  < from: Transthoracic Echocardiogram (23 @ 14:05) >  CONCLUSIONS:  Limited study to evaluate for endocarditis.  Unable to  exclude endocarditis.  Consider STEPHANIE for further evaluation,  if clinically indicated.    < end of copied text >

## 2023-09-14 NOTE — PROGRESS NOTE ADULT - ASSESSMENT
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 While in MICU patient noted with worsening renal failure requiring HD initiation, CGE/ Melena s/p EGD 8/31 found with esophagitis and erosive gastropathy, new right cerebellar infarct and fevers second to ESBL COLI and MSSA VAP, MSSA bacteremia, left ear otitis externa and drug rxn to cephalosporins Course further complicated by prolonged vent time s/p tracheostomy 9/1 and transferred to RCU 9/3 completed by recurrent fevers with high PIP, respiratory acidosis and concern for volume overloaded.     NEUROLOGY  # Metabolic Encephalopath vs NEW cerebella infarct   - Baseline MS AOX3 with short term memory changes per family however noted with concern for poor mentation in ICU  - MRI (8/17) with NEW right cerebellar infarct and old left PCA/occipital infarcts  - STEPHANIE (8/17) with AV showing two linear mobile echogenic elements attached to valve leaflets consistent with Lambel's excrescence, but no TRINITY thrombus, and lambel's excrescence with uncertain clinical implication.   - EEG (8/11-8/15) with no seizures   - ILR interrogation (9/7) with SR and PACs falsely recorded as AF.   - Attempted to resume ASA for medical management but held given GIB   - Continue on lipitor for medical management   - Course complicated by questionable head and body shaking 9/8 however prolactin elevated and shakes head yes/no to some questions.   - Lethargy more likely second to respiratory acidosis and will hold on RPT EEG    - Monitor mentation closely Awakens to verbal stimuli     CARDIOVASCULAR  # HTN Urgency   # Septic vs vasoplegic shock   - Labile BPs ranging in between SBP 80s-200s and attempted labetalol, however noted with hypotension and bradycardia likely from BB toxicity vs shock state?    - Holding Labetalol and Catapres   - Continue on Cardura for now however if BP soft preventing fluid removal then hold Cardura     # Hx of HFpEF with likely ADHF with volume overload.   - ECHO (7/2023) with EF 59 with severe LVH and diastolic dysfunction   - STEPHANIE (8/18) with normal LVRVSF however noted with increased LA pressures and persistent LA septal bowing into RA.   - ECHO (8/11) with EF 60 with mild LVH, normal LVRVSF, and mild ddfxn.  - Remove volume with HD sessions and intermittent bumex pushes as BP allows    - Bumex 1mg IV ordered 9/13- Monitor UOP    HEENT   # Left ear OE   - ENT evaluation (9/7) with no mastoid tenderness, however found with positive swelling and excoriation to left auricle and tenderness to manipulation of auricle. Debrided crusting of auricle and EAC evaluated with edema and unable to visualize TM. EAC debrided and found with watery exudate.   - Continue on CiproHC (9/5 - )   - Continue on Bradford (9/1 - ) as below   - ENT following and ear wick change QD   - Wick out but needs ? debridement wound care called/fu ent   - ENT recommending CT IAC w/ IV con but family is refusing as concerned given CKD, kidneys wouldn't recover from IV contrast. Spoke with Nephrology, ENT, and patient's  at length. Planned for CT non con and per , decision will be made from there but for now doesn't want to risk further harming kidneys.  - CT IAC showing acute on chronic left-sided otitis externa and acute on chronic left-sided otomastoiditis. Superimposed left-sided tympanosclerosis. Superimposed cholesteatoma formation within the left   tympanomastoid cavity cannot be excluded. No evidence of malignant otitis externa.    # Oral Lesions with questionable Zoster vs Trauma?   - Patient with tongue pain and seen with anterior ulcerations consolidating and crusting and posterior ulceration.  - Case discussed with pathology resident and culture/ cytology sent.   - HSV negative and Path (9/6) with normal appearing epithelial cells singly and in clusters devoid of viral cytopathic effect.   - Continue on magic mouth wash for pain    RESPIRATORY  # AHHRF second to volume overload   - Hx of CKD and HFpEF presented with SOB and hypercarbia second to volume overload requiring intubation and HD initiation   - Vent weaning attempted however limited requiring tracheostomy (9/1) with IP   - Course complicated by PIPs elevated (50s) and respiratory acidosis second to secretions vs volume ?    - Vent adjusted 20/300/5/30 without improvement.   - POCUS (9/8) with BL pleural effusions (large on right and small on left)   - CT CHEST (9/8) with TBM, BL pleural effusions and continued consolidations   - Continue on vent and given TBM increased PEEP (9/9) to 20/300/8/40 with overall improvement   - Continue on duonebs and HTS for airway clearance   - Continue volume removal with diuresis and aggressive UF sessions.   - Discussing possible thora but appears improved and will monitor.  ]  - Bedside US showing decrease in pleural effusion - hold off thoracentesis 9/10     GI  # UGIB   - CGE x 1 episode on 8/24 and melena x 2 episodes on 8/31 likely residual blood.   - EGD (8/31) found with esophagitis and erosive gastropathy  - Continue on PPI BID through late October and then PPI QD   - No melena     # Dysphagia   - NGT-TF continued   - Pending PEG however needs improvement in infectious status prior to placement.     RENAL  # Progressive CKD   - Presented volume overload and progressive RUSS on CKD (baseline CRE in 2s and mode into the 3s on admission) likely obstruction vs low flow state with shock vs AIN from drug reaction issue?  - POCUS with no signs of hydronephrosis   - Pressor support started with improvement in renal function  - Attempted steroid course in ICU without improvement in renal function   - Case discussed at length with renal and initiated on HD in ICU and now continued on HD TTHS, however remains volume overloaded.   - Patient oliguric however responds to Bumex pushes   - Continue on aggressive UF sessions with HD TTHS and bumex pushes as BP allows   - Monitor renal function and UOP     # Hyperphosphatemia   - PTH normal and elevated phos likely from renal failure  - Phos binder with Renvela started with improvement.     INFECTIOUS DISEASE  # ESBL ECOLI and MSSA VAP c/b MSSA bacteremia   - RVP/ COVID (8/11) negative   - SCx (8/11) with MSSA s/p cefepime (8/12-8/13) and then ancef (8/14) however noted with drug reaction and then changed to vanco and aztreonam (8/12-8/13) in ICU .   - Course complicated by recurrent fevers and return of MSSA with bacteremia.   - SCx (9/1) with MSSA and ESBL ECOLI   - BAL (9/1) with MSSA   - BCx (9/1) with MSSA and cleared on (9/4)   - ECHO (9/6) unable to rule out endocarditis   - Continue on Bradford (9/4 - ) and Vanco BY DOSE POST HD (9/4, 9/7, 9/9 ...) with duration TBD at this time.   - Given inability to rule out endocarditis will discuss possible 4 wks?   - Course complicated by fever (9/7) and UA with leuks but no bacteria, however compared to prior improved, RVP/COVID and SCx negative, and RPT CXR similar to prior   - Pending BCx, SCx, UCx, and LE DOPPLERS- Blood and sputum cx NTD; Acute left deep vein thrombosis of the left popliteal vein.  - Febrile overnight 102 recultured and sent off   -Pt receiving vanco post HD however today given vanco 750mg x 1 will check Vanco level at end of HD session and dose   -     # Left ear OE   - ENT evaluation (9/7) with no mastoid tenderness, however found with positive swelling and excoriation to left auricle and tenderness to manipulation of auricle. Debrided crusting of auricle and EAC evaluated with edema and unable to visualize TM. EAC debrided and found with watery exudate.   - Continue on CiproHC (9/5 - )   - Continue on Bradford (9/1 - ) as below     # MRSA PCRs   - MRSA PCR (8/11 and 8/14) negative   - Next MRSA + PCR ordered for 10/8     HEME  # Anemia second to GIB vs renal disease   - Hold chemical DVT PPX given bleeding/ waxing and waning HH   - s/p multiple units of PRBCs (8/15, 8/21, 8/28, 8/31 and 9/8)   - Anemia panel with AOCD but with low %sat and ferrous sulfate added to optimize   - Monitor HH and discuss with renal for EPO sometime next week.   - SPoke with GI for clearance to start Heparin gtt for + DVT     Vascular  # DVT  - Pt with + popliteal DVT on SCD Spoke with GI no absolute contraindication for starting Heparin - advise close monitoring for bleeding - Do stat CTA if bleeding occurs and call IR   - Pt specific heparin gtt started with goal PTT 60-85      ONC   # Hx of Breast CA   - Patient dx in 2018 and s/p BL mastectomy (radical on right) and chemo and RT.   - Supportive care.     ENDOCRINE  # IDDM2   - Continue on NPH 3U and ISS   - Monitor FS and adjust as needed     LINES/ TUBES  - R ARTHUR TAYLOR (8/19 - )     SKIN  # Drug Eruption   - Attempted cefepime (8/12) vs ancef (8/13-8/14) and then noted with drug rxn.   - Hold further cephalosporins at this time   - Continue on steroids with prednisone 40 (8/18 - 9/2) then prednisone 30 (9/3-9/7), then prednisone 20 (9/8 - 9/10), then prednisone 10mg (9/11-9/13) then turn off.     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 While in MICU patient noted with worsening renal failure requiring HD initiation, CGE/ Melena s/p EGD 8/31 found with esophagitis and erosive gastropathy, new right cerebellar infarct and fevers second to ESBL COLI and MSSA VAP, MSSA bacteremia, left ear otitis externa and drug rxn to cephalosporins Course further complicated by prolonged vent time s/p tracheostomy 9/1 and transferred to RCU 9/3 completed by recurrent fevers with high PIP, respiratory acidosis and concern for volume overloaded.     NEUROLOGY  # Metabolic Encephalopath vs NEW cerebella infarct   - Baseline MS AOX3 with short term memory changes per family however noted with concern for poor mentation in ICU  - MRI (8/17) with NEW right cerebellar infarct and old left PCA/occipital infarcts  - STEPHANIE (8/17) with AV showing two linear mobile echogenic elements attached to valve leaflets consistent with Lambel's excrescence, but no TRINITY thrombus, and lambel's excrescence with uncertain clinical implication.   - EEG (8/11-8/15) with no seizures   - ILR interrogation (9/7) with SR and PACs falsely recorded as AF.   - Attempted to resume ASA for medical management but held given GIB   - Continue on lipitor for medical management   - Course complicated by questionable head and body shaking 9/8 however prolactin elevated and shakes head yes/no to some questions.   - Lethargy more likely second to respiratory acidosis and will hold on RPT EEG    - Monitor mentation closely Awakens to verbal stimuli     CARDIOVASCULAR  # HTN Urgency   # Septic vs vasoplegic shock   - Labile BPs ranging in between SBP 80s-200s and attempted labetalol, however noted with hypotension and bradycardia likely from BB toxicity vs shock state?    - Holding Labetalol and Catapres   - Continue on Cardura for now however if BP soft preventing fluid removal then hold Cardura     # Hx of HFpEF with likely ADHF with volume overload.   - ECHO (7/2023) with EF 59 with severe LVH and diastolic dysfunction   - STEPHANIE (8/18) with normal LVRVSF however noted with increased LA pressures and persistent LA septal bowing into RA.   - ECHO (8/11) with EF 60 with mild LVH, normal LVRVSF, and mild ddfxn.  - Remove volume with HD sessions and intermittent bumex pushes as BP allows    - Bumex 2mg IV ordered 9/14- Monitor UOP    HEENT   # Left ear OE   - ENT evaluation (9/7) with no mastoid tenderness, however found with positive swelling and excoriation to left auricle and tenderness to manipulation of auricle. Debrided crusting of auricle and EAC evaluated with edema and unable to visualize TM. EAC debrided and found with watery exudate.   - Continue on CiproHC (9/5 - )   - Continue on Bradford (9/1 - ) as below   - ENT following and ear wick change QD   - Wick out but needs ? debridement wound care called/fu ent   - ENT recommending CT IAC w/ IV con but family is refusing as concerned given CKD, kidneys wouldn't recover from IV contrast. Spoke with Nephrology, ENT, and patient's  at length. Planned for CT non con and per , decision will be made from there but for now doesn't want to risk further harming kidneys.  - CT IAC showing acute on chronic left-sided otitis externa and acute on chronic left-sided otomastoiditis. Superimposed left-sided tympanosclerosis. Superimposed cholesteatoma formation within the left   tympanomastoid cavity cannot be excluded. No evidence of malignant otitis externa.    # Oral Lesions with questionable Zoster vs Trauma?   - Patient with tongue pain and seen with anterior ulcerations consolidating and crusting and posterior ulceration.  - Case discussed with pathology resident and culture/ cytology sent.   - HSV negative and Path (9/6) with normal appearing epithelial cells singly and in clusters devoid of viral cytopathic effect.   - Continue on magic mouth wash for pain    RESPIRATORY  # AHHRF second to volume overload   - Hx of CKD and HFpEF presented with SOB and hypercarbia second to volume overload requiring intubation and HD initiation   - Vent weaning attempted however limited requiring tracheostomy (9/1) with IP   - Course complicated by PIPs elevated (50s) and respiratory acidosis second to secretions vs volume ?    - Vent adjusted 20/300/5/30 without improvement.   - POCUS (9/8) with BL pleural effusions (large on right and small on left)   - CT CHEST (9/8) with TBM, BL pleural effusions and continued consolidations   - Continue on vent and given TBM increased PEEP (9/9) to 20/300/8/40 with overall improvement   - Continue on duonebs and HTS for airway clearance   - Continue volume removal with diuresis and aggressive UF sessions.   - Discussing possible thora but appears improved and will monitor.  ]  - Bedside US showing decrease in pleural effusion - hold off thoracentesis 9/10     GI  # UGIB   - CGE x 1 episode on 8/24 and melena x 2 episodes on 8/31 likely residual blood.   - EGD (8/31) found with esophagitis and erosive gastropathy  - Continue on PPI BID through late October and then PPI QD   - No melena     # Dysphagia   - NGT-TF continued   - Pending PEG however needs improvement in infectious status prior to placement.     RENAL  # Progressive CKD   - Presented volume overload and progressive RUSS on CKD (baseline CRE in 2s and mode into the 3s on admission) likely obstruction vs low flow state with shock vs AIN from drug reaction issue?  - POCUS with no signs of hydronephrosis   - Pressor support started with improvement in renal function  - Attempted steroid course in ICU without improvement in renal function   - Case discussed at length with renal and initiated on HD in ICU and now continued on HD TTHS, however remains volume overloaded.   - Patient oliguric however responds to Bumex pushes   - Continue on aggressive UF sessions with HD TTHS and bumex pushes as BP allows   - Monitor renal function and UOP     Hyponatremia  -Salt tabs ordered and Gave hypertonic 1.5%NS  - Renal- holding off on HD for now    # Hyperphosphatemia   - PTH normal and elevated phos likely from renal failure  - Phos binder with Renvela started with improvement.     INFECTIOUS DISEASE  # ESBL ECOLI and MSSA VAP c/b MSSA bacteremia   - RVP/ COVID (8/11) negative   - SCx (8/11) with MSSA s/p cefepime (8/12-8/13) and then ancef (8/14) however noted with drug reaction and then changed to vanco and aztreonam (8/12-8/13) in ICU .   - Course complicated by recurrent fevers and return of MSSA with bacteremia.   - SCx (9/1) with MSSA and ESBL ECOLI   - BAL (9/1) with MSSA   - BCx (9/1) with MSSA and cleared on (9/4)   - ECHO (9/6) unable to rule out endocarditis   - Continue on Bradford (9/4 - ) and Vanco BY DOSE POST HD (9/4, 9/7, 9/9 ...) with duration TBD at this time.   - Given inability to rule out endocarditis will discuss possible 4 wks?   - Course complicated by fever (9/7) and UA with leuks but no bacteria, however compared to prior improved, RVP/COVID and SCx negative, and RPT CXR similar to prior   - Pending BCx, SCx, UCx, and LE DOPPLERS- Blood and sputum cx NTD; Acute left deep vein thrombosis of the left popliteal vein.  - Febrile overnight 102 recultured and sent off   -Pt receiving vanco post HD however today given vanco 750mg x 1 will check Vanco level at end of HD session and dose   -     # Left ear OE   - ENT evaluation (9/7) with no mastoid tenderness, however found with positive swelling and excoriation to left auricle and tenderness to manipulation of auricle. Debrided crusting of auricle and EAC evaluated with edema and unable to visualize TM. EAC debrided and found with watery exudate.   - Continue on CiproHC (9/5 - )   - Continue on Bradford (9/1 - ) as below     # MRSA PCRs   - MRSA PCR (8/11 and 8/14) negative   - Next MRSA + PCR ordered for 10/8     HEME  # Anemia second to GIB vs renal disease   - Hold chemical DVT PPX given bleeding/ waxing and waning HH   - s/p multiple units of PRBCs (8/15, 8/21, 8/28, 8/31 and 9/8)   - Anemia panel with AOCD but with low %sat and ferrous sulfate added to optimize   - Monitor HH and discuss with renal for EPO sometime next week.   - SPoke with GI for clearance to start Heparin gtt for + DVT     Vascular  # DVT  - Pt with + popliteal DVT on SCD Spoke with GI no absolute contraindication for starting Heparin - advise close monitoring for bleeding - Do stat CTA if bleeding occurs and call IR   - Pt specific heparin gtt started with goal PTT 60-85      ONC   # Hx of Breast CA   - Patient dx in 2018 and s/p BL mastectomy (radical on right) and chemo and RT.   - Supportive care.     ENDOCRINE  # IDDM2   - Continue on NPH 3U and ISS   - Monitor FS and adjust as needed     LINES/ TUBES  - R ARTHUR TAYLOR (8/19 - )     SKIN  # Drug Eruption   - Attempted cefepime (8/12) vs ancef (8/13-8/14) and then noted with drug rxn.   - Hold further cephalosporins at this time   - Continue on steroids with prednisone 40 (8/18 - 9/2) then prednisone 30 (9/3-9/7), then prednisone 20 (9/8 - 9/10), then prednisone 10mg (9/11-9/13) then turn off.     ETHICS/ GOC    - FULL CODE     DISPO - TBD

## 2023-09-14 NOTE — PROGRESS NOTE ADULT - ASSESSMENT
72 y/o F well known to me from my housecal outptn practice. she was admitted at [facility] 7/12-7/22 w aspiration PNA, was treated w CEFEPIME, developed an allergic rash,  dCHF, + MAC on AFB culture, had been progressively getting more and more lethargic and dyspneic at home since DC.   In  am of 8/11/23  ptn presented with respiratory distress w hypoxia and hypercarbia requiring intubation 2/2 volume overload +/- Asp PNA      Neuro   responds appropriately   Baseline MS AOx3, aphasic   - h/o CVA , on aspirin and statin . 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 Mcnulty, now in RCU  s/p septic shock overnight 9/1, now on meropenem, HDS  s/p trache, on vent support, copious secretions      Renal   Hyperkalemia with EKG changes  , initially treated w LOKELMA,  now resolved   Ptn well know to Dr. Colon, consult reviewed    HD 8/19,  8/21, 8/26 and 8/28.  s/p PUF 8/29 2.5 Liters, HD 8/30,  9/1, 9/4  started chronic HD 9/7,   cardura resumed  no HD today, on diuretics      GI  NPO  on tube feeds  coffee ground emesis x 1 8/24, melena overnight 8/31, s/p 1UPRBC, EGD/Colonoscopy: erosive gastropathy, esophagisits, on colonoscopy old blood seen no active bleeding, poor prep  family to decide re PEG placement    Endocrine  T2DM   A1C 7.1 in 7/2023   - BG goal 140-200     ID   No fever/leukocytosis, recheck temp   Hx latent TB which was treated, no concern for TB   - grew MAC on AFB culture from most recent admission. at [facility]  -MSSA Bacteremia 9/1, allergic to cephalosprins and PCN, on Vanco as per ID, renal dosing,   - sputum also grew E.Coli-ESBL, as per ID recs for  Bradford through 9/7( 1 week course as per ID) , afebrile.  - 9/8:  spike  temp 38.1C, has O. externa, has a wick, recs are to get a CT to R/O an abscess/bony involvement. cont Meropenem  9/9: ptn w fever overnight to 100.8,  may need shiley pulled if bacteremic, needs NECK CT as per ENT to R/O Mastoid bone involvement while having ongoing purulent DC from L ear 2/2 O. externa, getting daily wick changes  9/10:  spiked 102 overnight through Bradford and Vanco, purulent DC from O. externa, ENT recommend Ct of mastoid. ptn in HD, has Shiley in place, consider pulling shiley and send for Cx. would d/w Renal, and consider contacting  ID, last seen by Dr. Conner 9/6 9/11: remains febrile, Dr. Conner reconsulted. cont Bradford, Vanco  9/12: tmax 100.5, fever curve is improving, awaiting Left ear CT, on Bradford since 9/1. on  vanco for MSSA Bacteremia, does not tolerate cephalosporins. through 10/2  9/13: on full vent support via trache, appears uncomfortable and onur 2/2 Left O. externa. has an incidental left middle ear tumor, no acute middle ear interventions recommended, left ear wick replaced. on Meropenem, cx from Ear DC is neg. On Vanco for MSSA bacteremia through 10/2, course of Meropenem as per ID, afebrile in the past 24 hrs . Cardura for HTN resumed, HGB dropped to 6.4, got a dose of EPO and 1UPRBC, rpt 7.8, remains on HEPARIN drip for LEFT popliteal DVT  9/14: cont on Mupirocin locally to Left ear, cont IV Bradford, ENT signed off, afebrile x 48 hrs, Mro course to be determined by ID, on Vanco for MSSA bacteremia through 10/2. ongoing worsening hyponatremia, Hypertonic saline as per renal, no HD today, s/p 1UPRBC 9/13            Heme/Onc  ppx: place on SCD  Transfuse for hgb 6.4, no obv bleed. HDS  LEFT POPLITEAL VEIN ACUTE DVT on 9/10    Ethics  GOC - Discussed GOC with daughter and , they have opted in the past for full code. and she remains full code at present

## 2023-09-14 NOTE — PROGRESS NOTE ADULT - SUBJECTIVE AND OBJECTIVE BOX
NEPHROLOGY-[facility] [phone]        Patient seen and examined trach to vent.         MEDICATIONS  (STANDING):  albuterol/ipratropium for Nebulization 3 milliLiter(s) Nebulizer every 6 hours  atorvastatin 40 milliGRAM(s) Oral at bedtime  chlorhexidine 0.12% Liquid 15 milliLiter(s) Oral Mucosa every 12 hours  chlorhexidine 2% Cloths 1 Application(s) Topical daily  ciprofloxacin/hydrocortisone Suspension Otic 4 Drop(s) Left Ear two times a day  dextrose 5%. 1000 milliLiter(s) (50 mL/Hr) IV Continuous <Continuous>  dextrose 5%. 1000 milliLiter(s) (100 mL/Hr) IV Continuous <Continuous>  dextrose 50% Injectable 12.5 Gram(s) IV Push once  dextrose 50% Injectable 25 Gram(s) IV Push once  dextrose 50% Injectable 25 Gram(s) IV Push once  doxazosin 6 milliGRAM(s) Oral <User Schedule>  epoetin odalis (EPOGEN) Injectable 22785 Unit(s) SubCutaneous <User Schedule>  ferrous    sulfate Liquid 300 milliGRAM(s) Enteral Tube daily  FIRST- Mouthwash  BLM 10 milliLiter(s) Swish and Spit four times a day  glucagon  Injectable 1 milliGRAM(s) IntraMuscular once  heparin  Infusion 1000 Unit(s)/Hr (9 mL/Hr) IV Continuous <Continuous>  insulin lispro (ADMELOG) corrective regimen sliding scale   SubCutaneous every 6 hours  insulin NPH human recombinant 3 Unit(s) SubCutaneous every 6 hours  meropenem  IVPB 500 milliGRAM(s) IV Intermittent every 12 hours  mupirocin 2% Ointment 1 Application(s) Topical two times a day  mupirocin 2% Ointment 1 Application(s) Both Nostrils two times a day  pantoprazole  Injectable 40 milliGRAM(s) IV Push two times a day  psyllium Powder 1 Packet(s) Oral daily  sevelamer carbonate Powder 800 milliGRAM(s) Oral three times a day  sodium chloride 2 Gram(s) Oral two times a day  sodium chloride 1.5%. 500 milliLiter(s) (50 mL/Hr) IV Continuous <Continuous>  sodium chloride 3%  Inhalation 4 milliLiter(s) Inhalation every 6 hours      VITAL:  T(C): , Max: 37.6 (23 @ 12:30)  T(F): , Max: 99.6 (23 @ 12:30)  HR: 101 (23 @ 12:30)  BP: 127/49 (23 @ 12:30)  BP(mean): --  RR: 20 (23 @ 12:30)  SpO2: 97% (23 @ 12:30)  Wt(kg): --    I and O's:     @ 07:01  -   @ 07:00  --------------------------------------------------------  IN: 1207 mL / OUT: 390 mL / NET: 817 mL     @ 07:01  -   @ 14:06  --------------------------------------------------------  IN: 0 mL / OUT: 40 mL / NET: -40 mL          PHYSICAL EXAM:    Constitutional: trach to vent  HEENT: + tracheostomy, + NGT  Respiratory: Coarse BS  Cardiovascular: S1 and S2  Gastrointestinal: BS+, soft, NT/ND  Extremities: + peripheral edema  Neurological: UTO  : +external catheter   Skin: No rashes  Access: [facility] HD catheter ()    LABS:                        7.8    6.80  )-----------( 185      ( 14 Sep 2023 05:28 )             23.4     14    121<L>  |  88<L>  |  87<H>  ----------------------------<  235<H>  4.3   |  24  |  2.68<H>    Ca    8.0<L>      14 Sep 2023 05:28  Phos  3.5       Mg     2.50                 Urine Studies:  Urinalysis Basic - ( 14 Sep 2023 13:46 )    Color: Yellow / Appearance: Cloudy / S.015 / pH: x  Gluc: x / Ketone: Negative mg/dL  / Bili: Negative / Urobili: 0.2 mg/dL   Blood: x / Protein: 100 mg/dL / Nitrite: Negative   Leuk Esterase: Moderate / RBC: x / WBC x   Sq Epi: x / Non Sq Epi: x / Bacteria: x      Osmolality, Random Urine: 277 mosm/kg ( @ 13:46)  Sodium, Random Urine: 40 mmol/L ( @ 13:46)  Potassium, Random Urine: 17.3 mmol/L ( @ 13:46)  Chloride, Random Urine: 26 mmol/L ( @ 13:46)          IMPRESSION: 73F w/ HTN, DM2, CVA, breast CA-bilateral mastectomy, recurrent aspiration pneumonia/respiratory failure, and CKD, 23 p/w acute hypercapnic respiratory failure; c/b RUSS    (1)CKD - stage 4     (2)RUSS - ATN vs AIN -BUN and creatinine elevated w/ electrolyte derangements, (hyponatremia, hyperkalemia, hyperphosphatemia). Improved on HD    (3)Hyponatremia - Na+ declining     (4)Hypocalcemia corrects to ~9.4 for hypalbuminemia     (5)Pulm- hypercapnic respiratory failure on admission - now s/p trach; remains on vent     (6)CV - normotensive     (7)ID - on IV Meropenem/Vanco. BCx w/ NGTD    (8)Anemia - s/p PRBC yesterday, hgb stable     RECOMMEND:  (1)No HD today   (2)No objection to diuretics as needed   (3)Start 1.5% hypertonic saline at 50 ml/hr x 5 hrs + NACl tabs 2gm bid via NGT  (4)Continue Renvela 800 mg tid for now  (5)Epogen as ordered   (6)Dose new meds for GFR <10/HD    D/w RCU    Tere [facility] EMERITA  [facility]  [phone]      NEPHROLOGY-[facility] [phone]        Patient seen and examined trach to vent.         MEDICATIONS  (STANDING):  albuterol/ipratropium for Nebulization 3 milliLiter(s) Nebulizer every 6 hours  atorvastatin 40 milliGRAM(s) Oral at bedtime  chlorhexidine 0.12% Liquid 15 milliLiter(s) Oral Mucosa every 12 hours  chlorhexidine 2% Cloths 1 Application(s) Topical daily  ciprofloxacin/hydrocortisone Suspension Otic 4 Drop(s) Left Ear two times a day  dextrose 5%. 1000 milliLiter(s) (50 mL/Hr) IV Continuous <Continuous>  dextrose 5%. 1000 milliLiter(s) (100 mL/Hr) IV Continuous <Continuous>  dextrose 50% Injectable 12.5 Gram(s) IV Push once  dextrose 50% Injectable 25 Gram(s) IV Push once  dextrose 50% Injectable 25 Gram(s) IV Push once  doxazosin 6 milliGRAM(s) Oral <User Schedule>  epoetin odalis (EPOGEN) Injectable 54974 Unit(s) SubCutaneous <User Schedule>  ferrous    sulfate Liquid 300 milliGRAM(s) Enteral Tube daily  FIRST- Mouthwash  BLM 10 milliLiter(s) Swish and Spit four times a day  glucagon  Injectable 1 milliGRAM(s) IntraMuscular once  heparin  Infusion 1000 Unit(s)/Hr (9 mL/Hr) IV Continuous <Continuous>  insulin lispro (ADMELOG) corrective regimen sliding scale   SubCutaneous every 6 hours  insulin NPH human recombinant 3 Unit(s) SubCutaneous every 6 hours  meropenem  IVPB 500 milliGRAM(s) IV Intermittent every 12 hours  mupirocin 2% Ointment 1 Application(s) Topical two times a day  mupirocin 2% Ointment 1 Application(s) Both Nostrils two times a day  pantoprazole  Injectable 40 milliGRAM(s) IV Push two times a day  psyllium Powder 1 Packet(s) Oral daily  sevelamer carbonate Powder 800 milliGRAM(s) Oral three times a day  sodium chloride 2 Gram(s) Oral two times a day  sodium chloride 1.5%. 500 milliLiter(s) (50 mL/Hr) IV Continuous <Continuous>  sodium chloride 3%  Inhalation 4 milliLiter(s) Inhalation every 6 hours      VITAL:  T(C): , Max: 37.6 (23 @ 12:30)  T(F): , Max: 99.6 (23 @ 12:30)  HR: 101 (23 @ 12:30)  BP: 127/49 (23 @ 12:30)  BP(mean): --  RR: 20 (23 @ 12:30)  SpO2: 97% (23 @ 12:30)  Wt(kg): --    I and O's:     @ 07:01  -   @ 07:00  --------------------------------------------------------  IN: 1207 mL / OUT: 390 mL / NET: 817 mL     @ 07:01  -   @ 14:06  --------------------------------------------------------  IN: 0 mL / OUT: 40 mL / NET: -40 mL          PHYSICAL EXAM:    Constitutional: trach to vent  HEENT: + tracheostomy, + NGT  Respiratory: Coarse BS  Cardiovascular: S1 and S2  Gastrointestinal: BS+, soft, NT/ND  Extremities: + peripheral edema  Neurological: UTO  : +external catheter   Skin: No rashes  Access: [facility] HD catheter ()    LABS:                        7.8    6.80  )-----------( 185      ( 14 Sep 2023 05:28 )             23.4     14    121<L>  |  88<L>  |  87<H>  ----------------------------<  235<H>  4.3   |  24  |  2.68<H>    Ca    8.0<L>      14 Sep 2023 05:28  Phos  3.5       Mg     2.50                 Urine Studies:  Urinalysis Basic - ( 14 Sep 2023 13:46 )    Color: Yellow / Appearance: Cloudy / S.015 / pH: x  Gluc: x / Ketone: Negative mg/dL  / Bili: Negative / Urobili: 0.2 mg/dL   Blood: x / Protein: 100 mg/dL / Nitrite: Negative   Leuk Esterase: Moderate / RBC: x / WBC x   Sq Epi: x / Non Sq Epi: x / Bacteria: x      Osmolality, Random Urine: 277 mosm/kg ( @ 13:46)  Sodium, Random Urine: 40 mmol/L ( @ 13:46)  Potassium, Random Urine: 17.3 mmol/L ( @ 13:46)  Chloride, Random Urine: 26 mmol/L ( @ 13:46)          IMPRESSION: 73F w/ HTN, DM2, CVA, breast CA-bilateral mastectomy, recurrent aspiration pneumonia/respiratory failure, and CKD, 23 p/w acute hypercapnic respiratory failure; c/b RUSS    (1)CKD - stage 4     (2)RUSS - ATN vs AIN -BUN and creatinine elevated w/ electrolyte derangements, (hyponatremia, hyperkalemia, hyperphosphatemia). Improved on HD    (3)Hyponatremia - Na+ declining     (4)Hypocalcemia corrects to ~9.4 for hypalbuminemia     (5)Pulm- hypercapnic respiratory failure on admission - now s/p trach; remains on vent     (6)CV - normotensive     (7)ID - on IV Meropenem/Vanco. BCx w/ NGTD    (8)Anemia - s/p PRBC yesterday, hgb stable     RECOMMEND:  (1)No HD today   (2)No objection to diuretics as needed   (3)Start 1.5% hypertonic saline at 50 ml/hr x 5 hrs + NACl tabs 2gm bid via NGT  (4)Continue Renvela 800 mg tid for now  (5)Epogen as ordered   (6)Dose new meds for GFR <10/HD    D/w RCU    [facility] NP  [facility]  [phone]       RENAL ATTENDING NOTE  Patient seen and examined with NP. Agree with assessment and plan as above.    Jimmy Colon MD  [facility]  [phone]

## 2023-09-14 NOTE — PROGRESS NOTE ADULT - PROBLEM SELECTOR PLAN 1
Multifactorial     - Aspiration, acute on chronic diastolic CHF   restarted IV abx   s/p trach 9/1 < from: 12 Lead ECG (11.04.22 @ 09:15) >    Ventricular Rate 116 BPM    Atrial Rate 116 BPM    P-R Interval 148 ms    QRS Duration 94 ms    Q-T Interval 350 ms    QTC Calculation(Bazett) 486 ms    P Axis 73 degrees    R Axis 78 degrees    T Axis 76 degrees    Diagnosis Line Sinus tachycardia  Incomplete right bundle branch block  Borderline ECG    Confirmed by Omero Castillo ([id number]) on 11/4/2022 2:09:09 PM    < end of copied text >

## 2023-09-14 NOTE — PROGRESS NOTE ADULT - SUBJECTIVE AND OBJECTIVE BOX
ENT ISSUE/POD: Left otitis externa and otomastoiditis     HPI: Patient seen and examined at bedside. No acute complaints per  at bedside. Patient is on full ventilator support. Unable to answer any question.        PAST MEDICAL & SURGICAL HISTORY:  Breast CA      Diabetes      Stroke      Cardiac arrest      HTN (hypertension)      H/O mastectomy, bilateral        Allergies    isoniazid (Rash)  nafcillin (Unknown)  hydrALAZINE (Rash)  vitamin E (Short breath; Urticaria; Hives)  doxycycline (Rash)  cefepime (Rash)  NIFEdipine (Urticaria; Hives)    Intolerances      MEDICATIONS  (STANDING):  albuterol/ipratropium for Nebulization 3 milliLiter(s) Nebulizer every 6 hours  atorvastatin 40 milliGRAM(s) Oral at bedtime  buMETAnide Injectable 2 milliGRAM(s) IV Push once  chlorhexidine 0.12% Liquid 15 milliLiter(s) Oral Mucosa every 12 hours  chlorhexidine 2% Cloths 1 Application(s) Topical daily  ciprofloxacin/hydrocortisone Suspension Otic 4 Drop(s) Left Ear two times a day  dextrose 5%. 1000 milliLiter(s) (100 mL/Hr) IV Continuous <Continuous>  dextrose 5%. 1000 milliLiter(s) (50 mL/Hr) IV Continuous <Continuous>  dextrose 50% Injectable 12.5 Gram(s) IV Push once  dextrose 50% Injectable 25 Gram(s) IV Push once  dextrose 50% Injectable 25 Gram(s) IV Push once  doxazosin 6 milliGRAM(s) Oral <User Schedule>  epoetin odalis (EPOGEN) Injectable 22091 Unit(s) SubCutaneous <User Schedule>  ferrous    sulfate Liquid 300 milliGRAM(s) Enteral Tube daily  FIRST- Mouthwash  BLM 10 milliLiter(s) Swish and Spit four times a day  glucagon  Injectable 1 milliGRAM(s) IntraMuscular once  heparin  Infusion 1000 Unit(s)/Hr (9 mL/Hr) IV Continuous <Continuous>  insulin lispro (ADMELOG) corrective regimen sliding scale   SubCutaneous every 6 hours  insulin NPH human recombinant 3 Unit(s) SubCutaneous every 6 hours  meropenem  IVPB 500 milliGRAM(s) IV Intermittent every 12 hours  mupirocin 2% Ointment 1 Application(s) Topical two times a day  mupirocin 2% Ointment 1 Application(s) Both Nostrils two times a day  pantoprazole  Injectable 40 milliGRAM(s) IV Push two times a day  psyllium Powder 1 Packet(s) Oral daily  sevelamer carbonate Powder 800 milliGRAM(s) Oral three times a day  sodium chloride 3%  Inhalation 4 milliLiter(s) Inhalation every 6 hours    MEDICATIONS  (PRN):  acetaminophen   Oral Liquid .. 650 milliGRAM(s) Oral every 6 hours PRN Temp greater or equal to 38C (100.4F), Mild Pain (1 - 3)  dextrose Oral Gel 15 Gram(s) Oral once PRN Blood Glucose LESS THAN 70 milliGRAM(s)/deciliter      Social History: see consult note    Family history: see consult note    ROS:   ENT: all negative except as noted in HPI   Pulm: denies SOB, cough, hemoptysis  Neuro: denies numbness/tingling, loss of sensation  Endo: denies heat/cold intolerance, excessive sweating      Vital Signs Last 24 Hrs  T(C): 36.9 (14 Sep 2023 03:40), Max: 37.4 (13 Sep 2023 23:40)  T(F): 98.5 (14 Sep 2023 03:40), Max: 99.4 (13 Sep 2023 23:40)  HR: 97 (14 Sep 2023 07:25) (92 - 105)  BP: 117/35 (14 Sep 2023 08:08) (110/39 - 140/51)  BP(mean): --  RR: 20 (14 Sep 2023 03:40) (20 - 24)  SpO2: 96% (14 Sep 2023 07:25) (96% - 100%)    Parameters below as of 14 Sep 2023 03:55  Patient On (Oxygen Delivery Method): ventilator                              7.8    6.80  )-----------( 185      ( 14 Sep 2023 05:28 )             23.4    09-14    121<L>  |  88<L>  |  87<H>  ----------------------------<  235<H>  4.3   |  24  |  2.68<H>    Ca    8.0<L>      14 Sep 2023 05:28  Phos  3.5     09-14  Mg     2.50     09-14     PTT - ( 14 Sep 2023 07:11 )  PTT:55.1 sec    PHYSICAL EXAM:  Gen: NAD  Skin: No rashes, bruises, or lesions  Head: Normocephalic, Atraumatic  Face: no edema, erythema, or fluctuance. Parotid glands soft without mass  Eyes: no scleral injection  Ears:  Left - conchal bowl debrided more at bedside, there is healthy pink tissue underneath, mupirocin ointment applied to the conchal bowl. Large piece of debri removed from ear canal via alligator, TM intact but thickened. No mastoid tenderness, erythema, or ear bulging  Nose: Nares bilaterally patent, no discharge  Mouth: No Stridor / Drooling / Trismus.  Mucosa moist, tongue/uvula midline, oropharynx clear  Neck: + tracheostomy tube in the neck secured with posey. Flat, supple, no lymphadenopathy, trachea midline, no masses  Lymphatic: No lymphadenopathy  Resp: breathing easily, no stridor  Neuro: facial nerve intact, no facial droop

## 2023-09-14 NOTE — PROGRESS NOTE ADULT - PROBLEM SELECTOR PLAN 1
- Please keep pressure off from left ear at all times  - Continue with  broad spectrum antibiotics- now on meropenem , ciprodex x 10d  - Please apply mupirocin ointment to the left conchal bowl BID for 3 days till 9/16/2023  - No plan for any surgical intervention from ENT at this time  - ID recommendation appreciated for duration and length of antibiotics  - Please follow up with Dr. Feng outpatient  - Case discussed with Dr. Feng.

## 2023-09-14 NOTE — PROGRESS NOTE ADULT - ASSESSMENT
72 y/o Female w/ DM, CVA, HTN admitted for respiratory  failure s/p tracheostomy on 9/1/2023, bacteremia. ENT consulted for L OE 9/5. Requiring serial debridements, wick removed 9/13/2023, conchal bowl debrided, large piece of debri removed with alligator from external ear canal this morning, TM intact but thickened. No mastoid tenderness, Culture from 9/6/2023 with no growth. Patient started on heparin drip for acute left lower popliteal DVT on 9/11/2023 by primary team. + MRSA on mupirocin. H/H dropped to 6.4/19.8 s/p PRBC x1 unit. Patient is currently on meropenem per ID.     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 Superimposed left-sided tympanosclerosis. Superimposed cholesteatoma formation within the left   tympanomastoid cavity cannot be excluded. No evidence of malignant otitis externa.

## 2023-09-14 NOTE — PROGRESS NOTE ADULT - SUBJECTIVE AND OBJECTIVE BOX
Patient is a 73y old  Female who presents with a chief complaint of Respiratory distress (14 Sep 2023 18:12)      SUBJECTIVE / OVERNIGHT EVENTS ptn is afebrile for 48 hrs    MEDICATIONS  (STANDING):  albuterol/ipratropium for Nebulization 3 milliLiter(s) Nebulizer every 6 hours  atorvastatin 40 milliGRAM(s) Oral at bedtime  chlorhexidine 0.12% Liquid 15 milliLiter(s) Oral Mucosa every 12 hours  chlorhexidine 2% Cloths 1 Application(s) Topical daily  ciprofloxacin/hydrocortisone Suspension Otic 4 Drop(s) Left Ear two times a day  dextrose 5%. 1000 milliLiter(s) (50 mL/Hr) IV Continuous <Continuous>  dextrose 5%. 1000 milliLiter(s) (100 mL/Hr) IV Continuous <Continuous>  dextrose 50% Injectable 12.5 Gram(s) IV Push once  dextrose 50% Injectable 25 Gram(s) IV Push once  dextrose 50% Injectable 25 Gram(s) IV Push once  doxazosin 6 milliGRAM(s) Oral <User Schedule>  epoetin odalis (EPOGEN) Injectable 12001 Unit(s) SubCutaneous <User Schedule>  ferrous    sulfate Liquid 300 milliGRAM(s) Enteral Tube daily  FIRST- Mouthwash  BLM 10 milliLiter(s) Swish and Spit four times a day  glucagon  Injectable 1 milliGRAM(s) IntraMuscular once  heparin  Infusion 1000 Unit(s)/Hr (9.5 mL/Hr) IV Continuous <Continuous>  insulin lispro (ADMELOG) corrective regimen sliding scale   SubCutaneous every 6 hours  insulin NPH human recombinant 3 Unit(s) SubCutaneous every 6 hours  meropenem  IVPB 500 milliGRAM(s) IV Intermittent every 12 hours  mupirocin 2% Ointment 1 Application(s) Topical two times a day  mupirocin 2% Ointment 1 Application(s) Both Nostrils two times a day  pantoprazole  Injectable 40 milliGRAM(s) IV Push two times a day  psyllium Powder 1 Packet(s) Oral daily  sevelamer carbonate Powder 800 milliGRAM(s) Oral three times a day  sodium chloride 2 Gram(s) Oral two times a day  sodium chloride 1.5%. 500 milliLiter(s) (50 mL/Hr) IV Continuous <Continuous>  sodium chloride 3%  Inhalation 4 milliLiter(s) Inhalation every 6 hours    MEDICATIONS  (PRN):  acetaminophen   Oral Liquid .. 650 milliGRAM(s) Oral every 6 hours PRN Temp greater or equal to 38C (100.4F), Mild Pain (1 - 3)  dextrose Oral Gel 15 Gram(s) Oral once PRN Blood Glucose LESS THAN 70 milliGRAM(s)/deciliter      Vital Signs Last 24 Hrs  T(F): 99.6 (23 @ 12:30), Max: 99.6 (23 @ 12:30)  HR: 95 (23 @ 16:34) (95 - 105)  BP: 127/49 (23 @ 12:30) (110/39 - 140/51)  RR: 20 (23 @ 12:30) (20 - 24)  SpO2: 96% (23 @ 16:34) (94% - 100%)  Telemetry:   CAPILLARY BLOOD GLUCOSE      POCT Blood Glucose.: 187 mg/dL (14 Sep 2023 17:25)  POCT Blood Glucose.: 224 mg/dL (14 Sep 2023 11:41)  POCT Blood Glucose.: 246 mg/dL (14 Sep 2023 05:54)  POCT Blood Glucose.: 200 mg/dL (13 Sep 2023 23:34)    I&O's Summary    13 Sep 2023 07:01  -  14 Sep 2023 07:00  --------------------------------------------------------  IN: 1207 mL / OUT: 390 mL / NET: 817 mL    14 Sep 2023 07:01  -  14 Sep 2023 18:20  --------------------------------------------------------  IN: 0 mL / OUT: 40 mL / NET: -40 mL        PHYSICAL EXAM:  GENERAL: NAD, well-developed  HEAD:  Atraumatic, Normocephalic  EYES: EOMI, PERRLA, conjunctiva and sclera clear  NECK: Supple, No JVD  CHEST/LUNG: Clear to auscultation bilaterally; No wheeze  HEART: Regular rate and rhythm; No murmurs, rubs, or gallops  ABDOMEN: Soft, Nontender, Nondistended; Bowel sounds present  EXTREMITIES:  2+ Peripheral Pulses, No clubbing, cyanosis, or edema  PSYCH: AAOx3  NEUROLOGY: non-focal  SKIN: No rashes or lesions    LABS:                        7.8    6.80  )-----------( 185      ( 14 Sep 2023 05:28 )             23.4     -14    122<L>  |  88<L>  |  88<H>  ----------------------------<  167<H>  4.3   |  23  |  2.74<H>    Ca    8.0<L>      14 Sep 2023 13:33  Phos  3.2       Mg     2.50           PTT - ( 14 Sep 2023 13:33 )  PTT:56.9 sec      Urinalysis Basic - ( 14 Sep 2023 13:46 )    Color: Yellow / Appearance: Cloudy / S.015 / pH: x  Gluc: x / Ketone: Negative mg/dL  / Bili: Negative / Urobili: 0.2 mg/dL   Blood: x / Protein: 100 mg/dL / Nitrite: Negative   Leuk Esterase: Moderate / RBC: 11 /HPF / WBC 15 /HPF   Sq Epi: x / Non Sq Epi: x / Bacteria: x        RADIOLOGY & ADDITIONAL TESTS:    Imaging Personally Reviewed:    Consultant(s) Notes Reviewed:      Care Discussed with Consultants/Other Providers:

## 2023-09-14 NOTE — PROGRESS NOTE ADULT - NS ATTEND AMEND GEN_ALL_CORE FT
Pt is a 73F with PMHx IDDMII, CKD, hx CVA, CHFpEF, latent TB (s/p tx,) hx breast cancer (2018, s/p mastectomy and adjuvant CT/RT), and hx CVA s/p trach/PEG (now decannulated) initially presenting to [facility] on 8/11/23 with acute hypoxemic and hypercapnic respiratory failure s/p ETT intubation/MV and ARF with pulmonary edema c/b HTN emergency requiring nicardipine drip transferred to MICU for further management.     Pt initially p/w worsening RUE shaking and dysconjugate gaze with MRI 8/17 (+) new right cerebellar, midbrain, and multiple tiny embolic infarcts as well as known left PCA CVA. EEG (-). 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 ILR in place from prior CVA evaluation, last interrogated 9/7 without AF. At baseline, pt reportedly wheelchair bound with RUE tremors and some ADL assistance. Pt was unable to tolerate ASA 2/2 GIB, will re-attempt once more stable.     Pt with acute hypoxemic and hypercapnic respiratory failure s/p ETT intubation/MV possibly 2/2 flash pulmonary edema in the setting of HTN emergency +/- aspiration event. Pt with failure of ventilatory weaning now s/p tracheostomy placement (IP 9/1). Of note, pt also noted to have evidence of midbrain CVA with possible concern for central effect on respiratory drive, will monitor carefully and continue weaning trials as tolerated. Airway clearance therapy in place. Wean O2 supplementation for goal O2 saturation 90-95%. Oral hygiene and aspiration precautions in place.      Pt p/w ARF on CKD requiring initiation of HD 2/2 pulmonary edema and metabolic acidosis with metabolic encephalopathy. No urgent indication for HD today, further HD as per nephrology team. Finishing prednisone taper for possible AIN. Strict I/Os, pt makes small amount of urine. Now with acute hypervolemic hyponatremia, partially response to diuretics. Agree with nephrology recs, added salt tabs and hypertonic saline x1 with repeat labs for careful monitoring.     Hospital course further c/b recurrent infections, most recently with MSSA bacteremia (9/1, cleared 9/4 and 9/8) with (+) MSSA and ESBL EColi in BAL as well. Pt with possible cefepime vs. cefazolin drug-induced rash followed by transition to vancomycin. Was then found to have left otitis externa (9/5) now requiring serial bedside debridement with worsening necrotic tissue/erythema for which she was also initiated on meropenem. CTTB pending, family refusing contrast, non-contrast study obtained with evidence of bone involvement but no indication for further surgical intervention. Cultures NTD, pt remains febrile intermittently. Will continue with meropenem, duration to be determined given concern for osteo. Ciprodex otic drops x10 day course. Plan to complete vancomycin by level x4 week course through 10/2 (given inability to exclude endocarditis).     Pt also with oropharyngeal dysphagia requiring NGTs followed by UGIB s/p EGD (8/31) found to have esophagitis/erosive gastropathy now on PPI BID. Pt further found to have popliteal DVT. No c/i by GI for A/C, pt initiated on heparin drip with patient-specific pTTs. Will need to ultimately transition to Coumadin x3-6 months. Will also require PEG placement once more medically stable.     Dispo pending medical optimization. Pt full code. DVT ppx full A/C with heparin drip. Discussed with pt's family ( and daughter) at bedside daily.

## 2023-09-15 NOTE — PROGRESS NOTE ADULT - ASSESSMENT
IMPRESSION: 73F w/ HTN, DM2, CVA, breast CA-bilateral mastectomy, recurrent aspiration pneumonia/respiratory failure, and CKD, 8/11/23 p/w acute hypercapnic respiratory failure; c/b RUSS    (1)CKD - stage 4     (2)RUSS - ATN vs AIN -BUN and creatinine elevated w/ electrolyte derangements, (hyponatremia, hyperkalemia, hyperphosphatemia). Improved s/p HD, stable.     (3)Hyponatremia - Na+ improving s/p hypertonic saline yesterday     (4)Hypocalcemia corrects to ~9.1 for hypalbuminemia     (5)Pulm- hypercapnic respiratory failure on admission - now s/p trach; remains on vent     (6)CV - normotensive     (7)ID - on IV Meropenem/Vanco. BCx w/ NGTD    (8)Anemia - s/p PRBC yesterday, hgb stable     RECOMMEND:  (1)No HD today   (2)No objection to diuretics as needed   (3)Continue NaCl tabs 2gm bid via NGT  (4)Continue Renvela 800 mg tid for now  (5)Epogen as ordered   (6)Dose new meds for GFR <10/HD    D/w Dr. Isaiah Espinoza, [facility]  [phone]     IMPRESSION: 73F w/ HTN, DM2, CVA, breast CA-bilateral mastectomy, recurrent aspiration pneumonia/respiratory failure, and CKD, 8/11/23 p/w acute hypercapnic respiratory failure; c/b RUSS    (1)CKD - stage 4     (2)RUSS - ATN vs AIN -BUN and creatinine elevated w/ electrolyte derangements, (hyponatremia, hyperkalemia, hyperphosphatemia). Improved s/p HD, stable.     (3)Hyponatremia - Na+ improving s/p hypertonic saline yesterday     (4)Hypocalcemia corrects to ~9.1 for hypalbuminemia     (5)Pulm- hypercapnic respiratory failure on admission - now s/p trach; remains on vent     (6)CV - normotensive     (7)ID - on IV Meropenem/Vanco. BCx w/ NGTD    (8)Anemia - s/p PRBC yesterday, hgb stable     RECOMMEND:  (1)No HD today   (2)No objection to diuretics as needed   (3)Continue NaCl tabs 2gm bid via NGT  (4)Epogen as ordered   (5)Dose new meds for GFR <10/HD    D/w Dr. Isaiah Espinoza, [facility]  [facility]  [phone]    RENAL ATTENDING NOTE  Patient seen and examined with NP. Agree with assessment and plan as above.  We can stop the Renvela at present, as her PO4 is trending down and is at 3.0 today  Hopefully the BUN and creatinine remain stable over the weekend; if so, we could likely take out the shiley early next week    Jimmy Colon MD  [facility]  [phone]

## 2023-09-15 NOTE — PROGRESS NOTE ADULT - ASSESSMENT
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 family declined renal biopsy started on prednisone  s/p trach 9/1 and BAL with MSSA   ET cx with ESBL and MSSA  started on ana cristina 9/1  blood cx 9/1: MSSA   repeat negative 9/4, 9/8  CXR with b/l opacities, R increased  L ear edema and otitis externa    initial  resp failure for ?fluid ovrer load but also had MSSA in the sputum, got vanco, aztreonam one day then cefepime and had rash, renal failure which was considered due to cefepime and then received 2 days of cefazolin and then off, now with trach and bronc on 9/1 with MSSA bacteremia and BAL also MSSA  another ET cx with MSSA and ESBL E-coli  hypotension, MSSA bacteremia likely due to pneumonia, repeat blood cx negative 9/4, TTE limited unable to exclude endocarditis  L otitis externa on ana cristina since 9/1 but worsening edema and black discoloration with bullae forming and persistent fever (ear cx was negative)  Ct showed acute on chronic otitis externa and otomastoiditis , superimposed cholesteatoma can not be excluded, no malignant otitis externa  pt again febrile while ear has improved  * if fever again, would do chest/abd/pelvis CT with contrast  * s/p debridement of necrotic tissue by ENT  * c/w ana cristina  * cannot do cefazolin so will have to treat with vanco  *  check another level tomorrow and will continue vanco per level, if there is a regular schedule for the HD then start 500 post HD  * will need a 4 week course of vanco for the MSSA bacteremia from the negative blood cx through 10/2  * monitor CBC/diff and renal function    The above assessment and plan was discussed with the primary team    Yumiko Conner MD  contact on teams  After 5pm and on weekends call [phone]

## 2023-09-15 NOTE — PROGRESS NOTE ADULT - PROBLEM SELECTOR PLAN 1
Multifactorial     - Aspiration, acute on chronic diastolic CHF   restarted IV abx   s/p trach 9/1  ENT consulted for Left otitis externa and otomastoiditis

## 2023-09-15 NOTE — PROGRESS NOTE ADULT - SUBJECTIVE AND OBJECTIVE BOX
Patient is a 73y old  Female who presents with a chief complaint of Respiratory distress (15 Sep 2023 16:28)      SUBJECTIVE / OVERNIGHT EVENTS: spiking temps    MEDICATIONS  (STANDING):  albuterol/ipratropium for Nebulization 3 milliLiter(s) Nebulizer every 6 hours  atorvastatin 40 milliGRAM(s) Oral at bedtime  buMETAnide Injectable 2 milliGRAM(s) IV Push two times a day  chlorhexidine 0.12% Liquid 15 milliLiter(s) Oral Mucosa every 12 hours  chlorhexidine 2% Cloths 1 Application(s) Topical daily  ciprofloxacin/hydrocortisone Suspension Otic 4 Drop(s) Left Ear two times a day  dextrose 5%. 1000 milliLiter(s) (50 mL/Hr) IV Continuous <Continuous>  dextrose 5%. 1000 milliLiter(s) (100 mL/Hr) IV Continuous <Continuous>  dextrose 50% Injectable 12.5 Gram(s) IV Push once  dextrose 50% Injectable 25 Gram(s) IV Push once  dextrose 50% Injectable 25 Gram(s) IV Push once  doxazosin 6 milliGRAM(s) Oral <User Schedule>  epoetin odalis (EPOGEN) Injectable 90968 Unit(s) SubCutaneous <User Schedule>  ferrous    sulfate Liquid 300 milliGRAM(s) Enteral Tube daily  FIRST- Mouthwash  BLM 10 milliLiter(s) Swish and Spit four times a day  glucagon  Injectable 1 milliGRAM(s) IntraMuscular once  heparin  Infusion 1000 Unit(s)/Hr (9.5 mL/Hr) IV Continuous <Continuous>  insulin lispro (ADMELOG) corrective regimen sliding scale   SubCutaneous every 6 hours  insulin NPH human recombinant 3 Unit(s) SubCutaneous every 6 hours  mupirocin 2% Ointment 1 Application(s) Topical two times a day  mupirocin 2% Ointment 1 Application(s) Both Nostrils two times a day  pantoprazole  Injectable 40 milliGRAM(s) IV Push two times a day  psyllium Powder 1 Packet(s) Oral daily  sodium chloride 2 Gram(s) Oral two times a day  sodium chloride 3%  Inhalation 4 milliLiter(s) Inhalation every 6 hours    MEDICATIONS  (PRN):  acetaminophen   Oral Liquid .. 650 milliGRAM(s) Oral every 6 hours PRN Temp greater or equal to 38C (100.4F), Mild Pain (1 - 3)  dextrose Oral Gel 15 Gram(s) Oral once PRN Blood Glucose LESS THAN 70 milliGRAM(s)/deciliter      Vital Signs Last 24 Hrs  T(F): 100.1 (09-15-23 @ 15:01), Max: 100.8 (09-15-23 @ 00:00)  HR: 105 (09-15-23 @ 15:53) (92 - 108)  BP: 126/46 (09-15-23 @ 15:01) (101/63 - 149/48)  RR: 20 (09-15-23 @ 15:01) (20 - 26)  SpO2: 99% (09-15-23 @ 15:53) (95% - 103%)  Telemetry:   CAPILLARY BLOOD GLUCOSE      POCT Blood Glucose.: 227 mg/dL (15 Sep 2023 17:57)  POCT Blood Glucose.: 241 mg/dL (15 Sep 2023 12:18)  POCT Blood Glucose.: 244 mg/dL (15 Sep 2023 05:43)  POCT Blood Glucose.: 221 mg/dL (14 Sep 2023 23:50)    I&O's Summary    14 Sep 2023 07:01  -  15 Sep 2023 07:00  --------------------------------------------------------  IN: 889.5 mL / OUT: 190 mL / NET: 699.5 mL    15 Sep 2023 07:01  -  15 Sep 2023 19:15  --------------------------------------------------------  IN: 420 mL / OUT: 600 mL / NET: -180 mL        PHYSICAL EXAM:  GENERAL: NAD, well-developed  HEAD:  Atraumatic, Normocephalic  EYES: EOMI, PERRLA, conjunctiva and sclera clear  NECK: Supple, No JVD  CHEST/LUNG: Clear to auscultation bilaterally; No wheeze  HEART: Regular rate and rhythm; No murmurs, rubs, or gallops  ABDOMEN: Soft, Nontender, Nondistended; Bowel sounds present  EXTREMITIES:  2+ Peripheral Pulses, No clubbing, cyanosis, or edema  PSYCH: AAOx3  NEUROLOGY: non-focal  SKIN: No rashes or lesions    LABS:                        7.5    6.16  )-----------( 169      ( 15 Sep 2023 01:37 )             22.9     09-15    123<L>  |  88<L>  |  90<H>  ----------------------------<  211<H>  4.4   |  24  |  2.59<H>    Ca    8.0<L>      15 Sep 2023 16:25  Phos  3.2     09-15  Mg     2.50     09-15      PTT - ( 15 Sep 2023 01:37 )  PTT:79.6 sec      Urinalysis Basic - ( 15 Sep 2023 16:25 )    Color: x / Appearance: x / SG: x / pH: x  Gluc: 211 mg/dL / Ketone: x  / Bili: x / Urobili: x   Blood: x / Protein: x / Nitrite: x   Leuk Esterase: x / RBC: x / WBC x   Sq Epi: x / Non Sq Epi: x / Bacteria: x        RADIOLOGY & ADDITIONAL TESTS:    Imaging Personally Reviewed:    Consultant(s) Notes Reviewed:      Care Discussed with Consultants/Other Providers:

## 2023-09-15 NOTE — PROGRESS NOTE ADULT - ASSESSMENT
72 y/o Female w/ DM, CVA, HTN admitted for respiratory  failure s/p tracheostomy on 9/1/2023, bacteremia. ENT consulted for L OE 9/5. Requiring serial debridements, wick removed 9/13/2023, conchal bowl with healthy pink tissue, external ear canal clean and dry this morning, TM intact but thickened. No mastoid tenderness, Culture from 9/6/2023 with no growth. Patient started on heparin drip for acute left lower popliteal DVT on 9/11/2023 by primary team. 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 Superimposed left-sided tympanosclerosis. Superimposed cholesteatoma formation within the left tympanomastoid cavity cannot be excluded. No evidence of malignant otitis externa.  + MRSA on mupirocin. Patient is currently on meropenem per ID.

## 2023-09-15 NOTE — PROGRESS NOTE ADULT - SUBJECTIVE AND OBJECTIVE BOX
Follow Up:  MSSA bacteremia    Interval History: pt again febrile today, no WBC    ROS:    Unobtainable because: trached        Allergies  isoniazid (Rash)  nafcillin (Unknown)  hydrALAZINE (Rash)  vitamin E (Short breath; Urticaria; Hives)  doxycycline (Rash)  cefepime (Rash)  NIFEdipine (Urticaria; Hives)        ANTIMICROBIALS:  meropenem  IVPB 500 every 12 hours      OTHER MEDS:  acetaminophen   Oral Liquid .. 650 milliGRAM(s) Oral every 6 hours PRN  albuterol/ipratropium for Nebulization 3 milliLiter(s) Nebulizer every 6 hours  atorvastatin 40 milliGRAM(s) Oral at bedtime  buMETAnide Injectable 2 milliGRAM(s) IV Push two times a day  chlorhexidine 0.12% Liquid 15 milliLiter(s) Oral Mucosa every 12 hours  chlorhexidine 2% Cloths 1 Application(s) Topical daily  ciprofloxacin/hydrocortisone Suspension Otic 4 Drop(s) Left Ear two times a day  dextrose 5%. 1000 milliLiter(s) IV Continuous <Continuous>  dextrose 5%. 1000 milliLiter(s) IV Continuous <Continuous>  dextrose 50% Injectable 12.5 Gram(s) IV Push once  dextrose 50% Injectable 25 Gram(s) IV Push once  dextrose 50% Injectable 25 Gram(s) IV Push once  dextrose Oral Gel 15 Gram(s) Oral once PRN  doxazosin 6 milliGRAM(s) Oral <User Schedule>  epoetin odalis (EPOGEN) Injectable 63543 Unit(s) SubCutaneous <User Schedule>  ferrous    sulfate Liquid 300 milliGRAM(s) Enteral Tube daily  FIRST- Mouthwash  BLM 10 milliLiter(s) Swish and Spit four times a day  glucagon  Injectable 1 milliGRAM(s) IntraMuscular once  heparin  Infusion 1000 Unit(s)/Hr IV Continuous <Continuous>  insulin lispro (ADMELOG) corrective regimen sliding scale   SubCutaneous every 6 hours  insulin NPH human recombinant 3 Unit(s) SubCutaneous every 6 hours  mupirocin 2% Ointment 1 Application(s) Topical two times a day  mupirocin 2% Ointment 1 Application(s) Both Nostrils two times a day  pantoprazole  Injectable 40 milliGRAM(s) IV Push two times a day  psyllium Powder 1 Packet(s) Oral daily  sodium chloride 2 Gram(s) Oral two times a day  sodium chloride 3%  Inhalation 4 milliLiter(s) Inhalation every 6 hours      Vital Signs Last 24 Hrs  T(C): 37.8 (15 Sep 2023 15:01), Max: 38.2 (15 Sep 2023 00:00)  T(F): 100.1 (15 Sep 2023 15:01), Max: 100.8 (15 Sep 2023 00:00)  HR: 105 (15 Sep 2023 15:53) (92 - 108)  BP: 126/46 (15 Sep 2023 15:01) (101/63 - 149/48)  BP(mean): 74 (15 Sep 2023 00:00) (69 - 74)  RR: 20 (15 Sep 2023 15:01) (20 - 26)  SpO2: 99% (15 Sep 2023 15:53) (95% - 103%)    Parameters below as of 15 Sep 2023 15:53  Patient On (Oxygen Delivery Method): ventilator        Physical Exam:  General:    trached   ENT: L with improved edema  Respiratory:   trach on vent  abd:   soft, not tender  :     no CVAT, no suprapubic tenderness, no willard  Musculoskeletal : no joint swelling  Skin:    no rash now  vascular: [facility]                          7.5    6.16  )-----------( 169      ( 15 Sep 2023 01:37 )             22.9       09-15    123<L>  |  88<L>  |  88<H>  ----------------------------<  229<H>  4.0   |  22  |  2.59<H>    Ca    7.7<L>      15 Sep 2023 01:37  Phos  3.0     09-15  Mg     2.40     09-15        Urinalysis Basic - ( 15 Sep 2023 01:37 )    Color: x / Appearance: x / SG: x / pH: x  Gluc: 229 mg/dL / Ketone: x  / Bili: x / Urobili: x   Blood: x / Protein: x / Nitrite: x   Leuk Esterase: x / RBC: x / WBC x   Sq Epi: x / Non Sq Epi: x / Bacteria: x        MICROBIOLOGY:  Vancomycin Level, Trough: 16.7 ug/mL (09-15-23 @ 01:37)  v  .Blood Blood-Venous  09-10-23   No growth at 4 days  --  --      .Sputum Sputum  09-10-23   Normal Respiratory Cate present  --    Rare polymorphonuclear leukocytes per low power field  No Squamous epithelial cells per low power field  Rare Yeast like cells seen per oil power field  Rare Gram Positive Cocci in Clusters seen per oil power field      .Blood Blood-Peripheral  09-10-23   No growth at 5 days  --  --      .Sputum Sputum  09-08-23   Normal Respiratory Cate present  --    Numerous polymorphonuclear leukocytes per low power field  Numerous Squamous epithelial cells per low power field  Few Yeast like cells per oil power field  Results consistent with oropharyngeal contamination      .Blood Blood-Peripheral  09-08-23   No growth at 5 days  --  --      Ear Ear  09-06-23   No growth at 5 days  --  --      .Blood Blood-Peripheral  09-04-23   No growth at 5 days  --  --      .Bronchial Bronchial Lavage  09-01-23   Numerous Staphylococcus aureus Multiple Morphological Strains  Normal Respiratory Cate present  --  Staphylococcus aureus  Staphylococcus aureus      .Blood Blood-Peripheral  09-01-23   Growth in aerobic and anaerobic bottles: Staphylococcus aureus  Direct identification is available within approximately 3-5  hours either by Blood Panel Multiplexed PCR or Direct  MALDI-TOF. Details: [email or web address]  Growth in anaerobic bottle: blood culture bottle turned positive after  the final report was released.  --  Blood Culture PCR  Staphylococcus aureus      ET Tube ET Tube  09-01-23   Moderate Staphylococcus aureus  Moderate Escherichia coli ESBL  Normal Respiratory Cate present  --  Staphylococcus aureus  Escherichia coli ESBL      .Blood Blood-Peripheral  08-16-23   No growth at 5 days  --  --      .Blood Blood-Peripheral  08-16-23   No growth at 5 days  --  --                RADIOLOGY:  Images independently visualized and reviewed personally, findings as below  < from: CT Internal Auditory Canals No Cont (09.12.23 @ 17:13) >  IMPRESSION:    1. Acute on chronic left-sided otitis externa and acute on chronic   left-sided otomastoiditis. 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 Superimposed cholesteatoma formation within the left   tympanomastoid cavity cannot be excluded. No evidence of malignant otitis   externa.    2. Chronic right-sided mastoiditis.    < end of copied text >  < from: CT Chest No Cont (09.08.23 @ 21:27) >  IMPRESSION:    Endotracheal tube tip above the ines. Below the endotracheal tube tip,   the lower trachea is partially collapsed with a crescentic configuration   suggestive of tracheomalacia. More inferiorly there is severe stenosis of   the trachea measuring 5.6 x 4.1 mm.    The left upper lobe bronchus is obliterated, new from prior exam. There   is paramediastinal and perihilar consolidation of left upper lobe,   increased from prior exam. Tree-in-bud opacities throughout the left lung   are increased.    Cavitary consolidation of right upper lobe is not significantly changed.   Tree-in-bud opacities within the right lung are increased.    Large right pleural effusion and small left pleural effusion are   increased.    < end of copied text >

## 2023-09-15 NOTE — PROGRESS NOTE ADULT - NS ATTEND AMEND GEN_ALL_CORE FT
Pt is a 73F with PMHx IDDMII, CKD, hx CVA, CHFpEF, latent TB (s/p tx,) hx breast cancer (2018, s/p mastectomy and adjuvant CT/RT), and hx CVA s/p trach/PEG (now decannulated) initially presenting to [facility] on 8/11/23 with acute hypoxemic and hypercapnic respiratory failure s/p ETT intubation/MV and ARF with pulmonary edema c/b HTN emergency requiring nicardipine drip transferred to MICU for further management.     Pt initially p/w worsening RUE shaking and dysconjugate gaze with MRI 8/17 (+) new right cerebellar, midbrain, and multiple tiny embolic infarcts as well as known left PCA CVA. EEG (-). 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 ILR in place from prior CVA evaluation, last interrogated 9/7 without AF. At baseline, pt reportedly wheelchair bound with RUE tremors and some ADL assistance. Pt was unable to tolerate ASA 2/2 GIB, will re-attempt once more stable.     Pt with acute hypoxemic and hypercapnic respiratory failure s/p ETT intubation/MV possibly 2/2 flash pulmonary edema in the setting of HTN emergency +/- aspiration event. Pt with failure of ventilatory weaning now s/p tracheostomy placement (IP 9/1 with sutures now removed). Of note, pt also noted to have evidence of midbrain CVA with possible concern for central effect on respiratory drive, will monitor carefully and continue weaning trials as tolerated. Pt able to spontaneously breath intermittently, will continue to attempt further weaning trials. Airway clearance therapy in place. Wean O2 supplementation for goal O2 saturation 90-95%. Oral hygiene and aspiration precautions in place. Trach care and suctioning as per RCU team.     Pt p/w ARF on CKD requiring initiation of HD 2/2 pulmonary edema and metabolic acidosis with metabolic encephalopathy. No urgent indication for HD today, further HD as per nephrology team. Finishing prednisone taper for possible AIN. Strict I/Os, pt makes small amount of urine. Now with acute hypervolemic hyponatremia in the setting of renal failure, partially response to diuretics. Agree with nephrology recs, added salt tabs and hypertonic saline x1 9/14 with repeat labs showing uptrending hyponatremia. Will c/w diuretics + salt tabs with BMP q12hr.     Hospital course further c/b recurrent infections, most recently with MSSA bacteremia (9/1, cleared 9/4 and 9/8) with (+) MSSA and ESBL EColi in BAL as well. Pt with possible cefepime vs. cefazolin drug-induced rash followed by transition to vancomycin. Was then found to have left otitis externa (9/5) now requiring serial bedside debridement with worsening necrotic tissue/erythema for which she was also initiated on meropenem. CTTB pending, family refusing contrast, non-contrast study obtained with evidence of bone involvement but no indication for further surgical intervention. Cultures NTD, pt remains febrile intermittently. Will continue with meropenem, duration to be determined given concern for osteo. Ciprodex otic drops x10 day course. Plan to complete vancomycin by level x4 week course through 10/2 (given inability to exclude endocarditis).     Pt also with oropharyngeal dysphagia requiring NGTs followed by UGIB s/p EGD (8/31) found to have esophagitis/erosive gastropathy now on PPI BID. Pt further found to have popliteal DVT. No c/i by GI for A/C, pt initiated on heparin drip with patient-specific pTTs. Will need to ultimately transition to Coumadin x3-6 months after PEG placement.      Dispo pending medical optimization. Pt full code. DVT ppx full A/C with heparin drip. Discussed with pt's family ( and daughter) at bedside daily.

## 2023-09-15 NOTE — PROGRESS NOTE ADULT - SUBJECTIVE AND OBJECTIVE BOX
NEPHROLOGY     Patient seen and examined trach to paula,  at bedside.     MEDICATIONS  (STANDING):  albuterol/ipratropium for Nebulization 3 milliLiter(s) Nebulizer every 6 hours  atorvastatin 40 milliGRAM(s) Oral at bedtime  chlorhexidine 0.12% Liquid 15 milliLiter(s) Oral Mucosa every 12 hours  chlorhexidine 2% Cloths 1 Application(s) Topical daily  ciprofloxacin/hydrocortisone Suspension Otic 4 Drop(s) Left Ear two times a day  dextrose 5%. 1000 milliLiter(s) (100 mL/Hr) IV Continuous <Continuous>  dextrose 5%. 1000 milliLiter(s) (50 mL/Hr) IV Continuous <Continuous>  dextrose 50% Injectable 12.5 Gram(s) IV Push once  dextrose 50% Injectable 25 Gram(s) IV Push once  dextrose 50% Injectable 25 Gram(s) IV Push once  doxazosin 6 milliGRAM(s) Oral <User Schedule>  epoetin odalis (EPOGEN) Injectable 40269 Unit(s) SubCutaneous <User Schedule>  ferrous    sulfate Liquid 300 milliGRAM(s) Enteral Tube daily  FIRST- Mouthwash  BLM 10 milliLiter(s) Swish and Spit four times a day  glucagon  Injectable 1 milliGRAM(s) IntraMuscular once  heparin  Infusion 1000 Unit(s)/Hr (9.5 mL/Hr) IV Continuous <Continuous>  insulin lispro (ADMELOG) corrective regimen sliding scale   SubCutaneous every 6 hours  insulin NPH human recombinant 3 Unit(s) SubCutaneous every 6 hours  meropenem  IVPB 500 milliGRAM(s) IV Intermittent every 12 hours  mupirocin 2% Ointment 1 Application(s) Topical two times a day  mupirocin 2% Ointment 1 Application(s) Both Nostrils two times a day  pantoprazole  Injectable 40 milliGRAM(s) IV Push two times a day  psyllium Powder 1 Packet(s) Oral daily  sevelamer carbonate Powder 800 milliGRAM(s) Oral three times a day  sodium chloride 2 Gram(s) Oral two times a day  sodium chloride 1.5%. 500 milliLiter(s) (50 mL/Hr) IV Continuous <Continuous>  sodium chloride 3%  Inhalation 4 milliLiter(s) Inhalation every 6 hours    VITALS:  T(C): , Max: 38.2 (09-15-23 @ 00:00)  T(F): , Max: 100.8 (09-15-23 @ 00:00)  HR: 100 (09-15-23 @ 07:49)  BP: 101/63 (09-15-23 @ 04:00)  BP(mean): 74 (09-15-23 @ 00:00)  RR: 20 (09-15-23 @ 04:00)  SpO2: 98% (09-15-23 @ 07:49)    I and O's:    09-14 @ 07:01  -  09-15 @ 07:00  --------------------------------------------------------  IN: 889.5 mL / OUT: 190 mL / NET: 699.5 mL    PHYSICAL EXAM:  Constitutional: trach to vent  HEENT: + tracheostomy, + NGT  Respiratory: Coarse BS  Cardiovascular: S1 and S2  Gastrointestinal: BS+, soft, NT/ND  Extremities: + peripheral edema  Neurological: UTO  : +external catheter   Skin: No rashes  Access: [facility] HD catheter (8/19)    LABS:                        7.5    6.16  )-----------( 169      ( 15 Sep 2023 01:37 )             22.9     09-15    123<L>  |  88<L>  |  88<H>  ----------------------------<  229<H>  4.0   |  22  |  2.59<H>    Ca    7.7<L>      15 Sep 2023 01:37  Phos  3.0     09-15  Mg     2.40     09-15    Urine Studies:  Urinalysis Basic - ( 15 Sep 2023 01:37 )    Color: x / Appearance: x / SG: x / pH: x  Gluc: 229 mg/dL / Ketone: x  / Bili: x / Urobili: x   Blood: x / Protein: x / Nitrite: x   Leuk Esterase: x / RBC: x / WBC x   Sq Epi: x / Non Sq Epi: x / Bacteria: x    Osmolality, Random Urine: 277 mosm/kg (09-14 @ 13:46)  Sodium, Random Urine: 40 mmol/L (09-14 @ 13:46)  Potassium, Random Urine: 17.3 mmol/L (09-14 @ 13:46)  Chloride, Random Urine: 26 mmol/L (09-14 @ 13:46)

## 2023-09-15 NOTE — PROGRESS NOTE ADULT - ASSESSMENT
72 y/o F well known to me from my housecal outptn practice. she was admitted at [facility] 7/12-7/22 w aspiration PNA, was treated w CEFEPIME, developed an allergic rash,  dCHF, + MAC on AFB culture, had been progressively getting more and more lethargic and dyspneic at home since DC.   In  am of 8/11/23  ptn presented with respiratory distress w hypoxia and hypercarbia requiring intubation 2/2 volume overload +/- Asp PNA      Neuro   responds appropriately   Baseline MS AOx3, aphasic   - h/o CVA , on aspirin and statin . 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 Mcnulty, now in RCU  s/p septic shock overnight 9/1, now on meropenem, HDS  s/p trache, on vent support, copious secretions      Renal   Hyperkalemia with EKG changes  , initially treated w LOKELMA,  now resolved   Ptn well know to Dr. Colon, consult reviewed    HD 8/19,  8/21, 8/26 and 8/28.  s/p PUF 8/29 2.5 Liters, HD 8/30,  9/1, 9/4  started chronic HD 9/7,   cardura resumed  no HD today, on diuretics      GI  NPO  on tube feeds  coffee ground emesis x 1 8/24, melena overnight 8/31, s/p 1UPRBC, EGD/Colonoscopy: erosive gastropathy, esophagisits, on colonoscopy old blood seen no active bleeding, poor prep  family to decide re PEG placement    Endocrine  T2DM   A1C 7.1 in 7/2023   - BG goal 140-200     ID   No fever/leukocytosis, recheck temp   Hx latent TB which was treated, no concern for TB   - grew MAC on AFB culture from most recent admission. at [facility]  -MSSA Bacteremia 9/1, allergic to cephalosprins and PCN, on Vanco as per ID, renal dosing,   - sputum also grew E.Coli-ESBL, as per ID recs for  Bradford through 9/7( 1 week course as per ID) , afebrile.  - 9/8:  spike  temp 38.1C, has O. externa, has a wick, recs are to get a CT to R/O an abscess/bony involvement. cont Meropenem  9/9: ptn w fever overnight to 100.8,  may need shiley pulled if bacteremic, needs NECK CT as per ENT to R/O Mastoid bone involvement while having ongoing purulent DC from L ear 2/2 O. externa, getting daily wick changes  9/10:  spiked 102 overnight through Bradford and Vanco, purulent DC from O. externa, ENT recommend Ct of mastoid. ptn in HD, has Shiley in place, consider pulling shiley and send for Cx. would d/w Renal, and consider contacting  ID, last seen by Dr. Conner 9/6 9/11: remains febrile, Dr. Conner reconsulted. cont Bradford, Vanco  9/12: tmax 100.5, fever curve is improving, awaiting Left ear CT, on Bradford since 9/1. on  vanco for MSSA Bacteremia, does not tolerate cephalosporins. through 10/2  9/13: on full vent support via trache, appears uncomfortable and onur 2/2 Left O. externa. has an incidental left middle ear tumor, no acute middle ear interventions recommended, left ear wick replaced. on Meropenem, cx from Ear DC is neg. On Vanco for MSSA bacteremia through 10/2, course of Meropenem as per ID, afebrile in the past 24 hrs . Cardura for HTN resumed, HGB dropped to 6.4, got a dose of EPO and 1UPRBC, rpt 7.8, remains on HEPARIN drip for LEFT popliteal DVT  9/14: cont on Mupirocin locally to Left ear, cont IV Bradford, ENT signed off, afebrile x 48 hrs, Mro course to be determined by ID, on Vanco for MSSA bacteremia through 10/2. ongoing worsening hyponatremia, Hypertonic saline as per renal, no HD today, s/p 1UPRBC 9/13  9/15: spiking temps again, if cont to spike as per ID re PAN scan. cont Vanco for MSSA Bacteremia      Heme/Onc  ppx: place on SCD  Transfuse for hgb 6.4, no obv bleed. HDS  LEFT POPLITEAL VEIN ACUTE DVT on 9/10    Ethics  GOC - Discussed GOC with daughter and , they have opted in the past for full code. and she remains full code at present

## 2023-09-15 NOTE — PROGRESS NOTE ADULT - PROBLEM SELECTOR PLAN 1
- Improved, no further debridement needed at this time  - Please keep pressure off from left ear at all times  - Continue with  broad spectrum antibiotics- now on meropenem , ciprodex x 10d  - Please apply mupirocin ointment to the left conchal bowl BID for 3 days till 9/16/2023  - No plan for any surgical intervention from ENT at this time, ENT signed off  - ID recommendation appreciated for duration and length of antibiotics  - Please follow up with Dr. Feng outpatient  - Case discussed with Dr. Feng.

## 2023-09-15 NOTE — PROGRESS NOTE ADULT - SUBJECTIVE AND OBJECTIVE BOX
"Date of Office Visit: 21  Encounter Provider: Victor Hugo Ng MD  Place of Service: [facility]  Patient Name: Ange Johnson  :1941    Chief Complaint   Patient presents with   • Coronary Artery Disease   :     HPI:     Ms. Johnson is 80 y.o. and presents today to follow up. I have reviewed prior notes and there are no changes except for any new updates described below. I have also reviewed any information entered into the medical record by the patient or by ancillary staff.     I initially evaluated her in 2016 for preoperative risk assessment prior to thyroid surgery.  She reported chronic exertional dyspnea at that time.  She had a soft systolic murmur.  An echo showed normal LVSF and aortic sclerosis without stenosis.  I did not feel that she needed stress testing as her dyspnea was stable and long-standing.  Her EKG showed diffuse nonspecific ST abnormalities which were old.    I then saw her again in 2020 for dyspnea and chest pain which she described as \"heartburn.\"  An echo was normal, but a perfusion stress was abnormal/high-risk.  She underwent coronary angiography which revealed multivessel CAD, and underwent 5V CABG in 2020.  Her post-operative course was unremarkable other than an episode of atrial fibrillation and lower extremity superficial thrombophlebitis. She was placed on renally dosed apixaban as well as amiodarone.  Both of these have subsequently been stopped.       She presented in 2021 with increasing dyspnea and edema. An echo revealed normal LVSF (EF 55-60%), grade 2 diastolic dysfunction, mild-moderate TR/PHTN. I increased her diuretics.  She presented in 2021 with increased fatigue and dyspnea; a perfusion stress test was performed. Per my interpretation it showed a small amount of anterolateral ischemia; given her severe CKD, medical therapy was recommended.     She has great days, and then she has days " Subjective: Patient seen and examined. No new events except as noted.     REVIEW OF SYSTEMS:  Unable to obtain       MEDICATIONS:  MEDICATIONS  (STANDING):  albuterol/ipratropium for Nebulization 3 milliLiter(s) Nebulizer every 6 hours  atorvastatin 40 milliGRAM(s) Oral at bedtime  chlorhexidine 0.12% Liquid 15 milliLiter(s) Oral Mucosa every 12 hours  chlorhexidine 2% Cloths 1 Application(s) Topical daily  ciprofloxacin/hydrocortisone Suspension Otic 4 Drop(s) Left Ear two times a day  dextrose 5%. 1000 milliLiter(s) (50 mL/Hr) IV Continuous <Continuous>  dextrose 5%. 1000 milliLiter(s) (100 mL/Hr) IV Continuous <Continuous>  dextrose 50% Injectable 12.5 Gram(s) IV Push once  dextrose 50% Injectable 25 Gram(s) IV Push once  dextrose 50% Injectable 25 Gram(s) IV Push once  doxazosin 6 milliGRAM(s) Oral <User Schedule>  epoetin odalis (EPOGEN) Injectable 00009 Unit(s) SubCutaneous <User Schedule>  ferrous    sulfate Liquid 300 milliGRAM(s) Enteral Tube daily  FIRST- Mouthwash  BLM 10 milliLiter(s) Swish and Spit four times a day  glucagon  Injectable 1 milliGRAM(s) IntraMuscular once  heparin  Infusion 1000 Unit(s)/Hr (9.5 mL/Hr) IV Continuous <Continuous>  insulin lispro (ADMELOG) corrective regimen sliding scale   SubCutaneous every 6 hours  insulin NPH human recombinant 3 Unit(s) SubCutaneous every 6 hours  meropenem  IVPB 500 milliGRAM(s) IV Intermittent every 12 hours  mupirocin 2% Ointment 1 Application(s) Topical two times a day  mupirocin 2% Ointment 1 Application(s) Both Nostrils two times a day  pantoprazole  Injectable 40 milliGRAM(s) IV Push two times a day  psyllium Powder 1 Packet(s) Oral daily  sevelamer carbonate Powder 800 milliGRAM(s) Oral three times a day  sodium chloride 2 Gram(s) Oral two times a day  sodium chloride 1.5%. 500 milliLiter(s) (50 mL/Hr) IV Continuous <Continuous>  sodium chloride 3%  Inhalation 4 milliLiter(s) Inhalation every 6 hours      PHYSICAL EXAM:  T(C): 37.6 (09-15-23 @ 04:00), Max: 38.2 (09-15-23 @ 00:00)  HR: 100 (09-15-23 @ 07:49) (92 - 108)  BP: 101/63 (09-15-23 @ 04:00) (101/63 - 149/48)  RR: 20 (09-15-23 @ 04:00) (20 - 26)  SpO2: 98% (09-15-23 @ 07:49) (94% - 100%)  Wt(kg): --  I&O's Summary    14 Sep 2023 07:01  -  15 Sep 2023 07:00  --------------------------------------------------------  IN: 889.5 mL / OUT: 190 mL / NET: 699.5 mL            Appearance: NAD, +trach  HEENT: dry oral mucosa  Lymphatic: No lymphadenopathy  Cardiovascular: Normal S1 S2, No JVD, No murmurs, No edema  Respiratory: Decreased BS, + trach to vent	  Neuro: opens eyes  Gastrointestinal: Soft, Non-tender, + BS, + OGT	  Skin: No rashes, No ecchymoses, No cyanosis	  Extremities: No strength/ROM 2/2 sedation, + BL LE edema  Vascular: Peripheral pulses palpable 2+ bilaterally        LABS:    CARDIAC MARKERS:                                7.5    6.16  )-----------( 169      ( 15 Sep 2023 01:37 )             22.9     09-15    123<L>  |  88<L>  |  88<H>  ----------------------------<  229<H>  4.0   |  22  |  2.59<H>    Ca    7.7<L>      15 Sep 2023 01:37  Phos  3.0     09-15  Mg     2.40     09-15      proBNP:   Lipid Profile:   HgA1c:   TSH:     Negative          TELEMETRY: 	    ECG:  	  RADIOLOGY:   DIAGNOSTIC TESTING:  [ ] Echocardiogram:  [ ]  Catheterization:  [ ] Stress Test:    OTHER: 	           where she is profoundly fatigued. She gets winded with exertion but not at rest. Her edema is at baseline, and she wears compression hose. She denies chest pain. She denies orthopnea.     Past Medical History:   Diagnosis Date   • Anemia in stage 3 chronic kidney disease (HCC) 2016   • Asthma    • Atherosclerosis of both carotid arteries     plaque without stenosis,    • Bone spur of acromioclavicular joint    • CAD (coronary artery disease)     2020: 90% dLM, 60% pLAD, 90% mLAD, 90% D1, 90% mLCx, 50-60% OM1, 50% pRCA; s/p CABG x 5 (LIMA-LAD, SVG-PDA, SVG-OM1, SVG-OM2, SVG-D1)   • Chronic diastolic (congestive) heart failure ([facility]) 2021   • Chronic venous insufficiency    • CKD (chronic kidney disease) stage 3, GFR 30-59 ml/min ([facility])    • Essential hypertension    • Gout    • Grief reaction       2010 after 15 foot fall off ladder   • H/O cataract    • Heel spur    • History of tendinitis    • Hyperlipidemia    • Hyperparathyroidism ([facility])    • Hyperthyroidism    • Hypothyroidism, acquired    • Non-toxic goiter    • Osteoarthritis    • Pneumonia    • Postoperative atrial fibrillation ([facility])     after CABG   • Proteinuria    • Thrombophlebitis leg     after CABG   • Type 2 diabetes mellitus with neurological manifestations ([facility])    • Vitamin D deficiency        Past Surgical History:   Procedure Laterality Date   • BREAST EXCISIONAL BIOPSY Left     Dr. Ybarra benign   • BREAST EXCISIONAL BIOPSY Bilateral     benign   • CARDIAC CATHETERIZATION N/A 2/10/2020    Procedure: Left heart cath without LV gram;  Surgeon: Emerson Deras MD;  Location: [facility] CATH INVASIVE LOCATION;  Service: Cardiovascular;  Laterality: N/A;   • CARDIAC CATHETERIZATION N/A 2/10/2020    Procedure: Coronary angiography;  Surgeon: Emerson Deras MD;  Location:  [facility] CATH INVASIVE LOCATION;  Service: Cardiovascular;  Laterality: N/A;   • CATARACT EXTRACTION Left 2010   • CATARACT EXTRACTION  Right 04/21/2010   • CORONARY ARTERY BYPASS GRAFT N/A 2/12/2020    Procedure: MIDLINE STERNOTOMY, CORONARY ARTERY BYPASS GRAFTING X  5 USING LEFT INTERNAL MAMMARY ARTERY AND LEFT ENDOSCOPICALLY HARVESTED GREATER SAPHENOUS VEIN, INTRAOP KERWIN, PRP;  Surgeon: Jr Anthony Rai MD;  Location: [facility];  Service: Cardiothoracic;  Laterality: N/A;   • HYSTERECTOMY  1988    Dr. Quinn Peña   • OOPHORECTOMY      late 40's   • THYROIDECTOMY Right 12/8/2016    Procedure: right PARATHYROID EXCISION OF NODULE and right thyroid lobectomy and bilateral exploration.;  Surgeon: Tasneem Montelongo MD;  Location: [facility];  Service:        Social History     Socioeconomic History   • Marital status:    • Number of children: 4   Tobacco Use   • Smoking status: Never Smoker   • Smokeless tobacco: Never Used   Vaping Use   • Vaping Use: Never used   Substance and Sexual Activity   • Alcohol use: Not Currently     Comment: Caffeine use: Decaf   • Drug use: No   • Sexual activity: Defer       Family History   Problem Relation Age of Onset   • Diabetes Sister    • Diabetes Brother    • Hypertension Brother    • Kidney disease Brother    • Cervical cancer Mother    • Heart disease Sister    • Neuropathy Sister    • Diabetes Sister    • Breast cancer Neg Hx        Review of Systems   Constitutional: Positive for malaise/fatigue.   Cardiovascular: Positive for dyspnea on exertion and leg swelling.   Musculoskeletal: Positive for joint pain and myalgias.   Neurological: Positive for excessive daytime sleepiness and dizziness.   All other systems reviewed and are negative.      Allergies   Allergen Reactions   • Codeine Nausea Only   • Hydrocodone-Acetaminophen Nausea Only and Other (See Comments)     Percocet and Vicodin; vertigo   • Meperidine Nausea Only and Nausea And Vomiting   • Morphine And Related Nausea Only   • Oxycodone-Acetaminophen Nausea Only   • Oxycodone-Acetaminophen Nausea Only and Other (See Comments)      dilzziness   • Oxycodone-Aspirin Nausea Only and Other (See Comments)     vertigo   • Nickel Rash   • Penicillins Hives   • Shrimp Rash   • Sulfa Antibiotics Hives         Current Outpatient Medications:   •  amLODIPine (NORVASC) 10 MG tablet, Daily., Disp: , Rfl:   •  aspirin (aspirin) 81 MG EC tablet, Take 1 tablet by mouth Daily., Disp: 90 tablet, Rfl: 3  •  atorvastatin (LIPITOR) 40 MG tablet, Take 1 tablet by mouth Every Night., Disp: 90 tablet, Rfl: 1  •  carBAMazepine (CARBATROL) 300 MG 12 hr capsule, Take 1 capsule by mouth Every 12 (Twelve) Hours for 180 days., Disp: 180 capsule, Rfl: 1  •  cetirizine (zyrTEC) 10 MG tablet, Take 10 mg by mouth Every Night., Disp: , Rfl:   •  cloNIDine (CATAPRES) 0.1 MG tablet, Take 1 tablet by mouth Every 12 (Twelve) Hours., Disp: 180 tablet, Rfl: 3  •  febuxostat (Uloric) 40 MG tablet, Take 1 tablet by mouth Daily for 180 days., Disp: 90 tablet, Rfl: 1  •  ferrous sulfate 325 (65 FE) MG tablet, Take 1 tablet by mouth daily., Disp: , Rfl:   •  Glucagon (Gvoke HypoPen 2-Pack) 1 MG/0.2ML solution auto-injector, Inject 1 mg under the skin into the appropriate area as directed As Needed (Hypoglycemia)., Disp: 0.4 mL, Rfl: 1  •  glucose blood (Contour Next Test) test strip, 1 each by Other route 2 (Two) Times a Day. use 1 strip to test twice a day, Disp: 100 each, Rfl: 3  •  hydrALAZINE (APRESOLINE) 100 MG tablet, Take 100 mg by mouth 2 (Two) Times a Day., Disp: , Rfl:   •  Insulin Glargine, 2 Unit Dial, (Toujeo Max SoloStar) 300 UNIT/ML solution pen-injector injection, Inject 60 Units under the skin into the appropriate area as directed Daily for 90 days., Disp: 27 mL, Rfl: 0  •  Insulin Lispro, 1 Unit Dial, (HumaLOG KwikPen) 100 UNIT/ML solution pen-injector, Inject 5 Units under the skin into the appropriate area as directed 3 (Three) Times a Day With Meals., Disp: 15 mL, Rfl: 0  •  Insulin Pen Needle (BD PEN NEEDLE ISELA U/F) 32G X 4 MM misc, Use to inject 4 time daily, Disp:  "200 each, Rfl: 5  •  losartan (COZAAR) 100 MG tablet, Take 100 mg by mouth Daily., Disp: , Rfl:   •  metoprolol tartrate (LOPRESSOR) 25 MG tablet, TAKE 1/2 TABLET BY MOUTH EVERY 12 HOURS, Disp: 90 tablet, Rfl: 3  •  montelukast (SINGULAIR) 10 MG tablet, 10 mg daily., Disp: , Rfl:   •  potassium chloride (MICRO-K) 10 MEQ CR capsule, Take 10 mEq by mouth Daily., Disp: , Rfl:   •  Synthroid 200 MCG tablet, Take 1 tablet by mouth Daily., Disp: 90 tablet, Rfl: 1  •  torsemide (DEMADEX) 100 MG tablet, Take 0.5 tablets by mouth 2 (Two) Times a Day. Pt takes 50mg in AM & 50mg in PM., Disp: , Rfl:   •  vitamin B-12 (CYANOCOBALAMIN) 1000 MCG tablet, Take 1 tablet by mouth daily., Disp: , Rfl:   •  vitamin D (ERGOCALCIFEROL) 1.25 MG (96614 UT) capsule capsule, Take 1 capsule by mouth 1 (One) Time Per Week., Disp: 12 capsule, Rfl: 1      Objective:     Vitals:    11/08/21 1449   BP: 156/62   BP Location: Right arm   Pulse: 57   Weight: 91.2 kg (201 lb)   Height: 167.6 cm (66\")     Body mass index is 32.44 kg/m².    Physical Exam  Constitutional:       Appearance: Normal appearance.   HENT:      Head: Normocephalic.      Nose: Nose normal.      Mouth/Throat:      Comments: Masked  Eyes:      Conjunctiva/sclera: Conjunctivae normal.   Cardiovascular:      Rate and Rhythm: Normal rate and regular rhythm.      Pulses: Normal pulses.      Heart sounds: Murmur heard.    Systolic murmur is present with a grade of 2/6 at the upper left sternal border.      Pulmonary:      Effort: Pulmonary effort is normal.      Breath sounds: Normal breath sounds.   Abdominal:      Palpations: Abdomen is soft.      Tenderness: There is no abdominal tenderness.   Musculoskeletal:         General: Swelling (1+ pedal) present.   Skin:     General: Skin is warm and dry.   Neurological:      General: No focal deficit present.      Mental Status: She is alert and oriented to person, place, and time.   Psychiatric:         Mood and Affect: Mood normal.       "   Behavior: Behavior normal.           ECG 12 Lead    Date/Time: 11/8/2021 3:00 PM  Performed by: Victor Hugo Ng MD  Authorized by: Victor Hugo Ng MD   Comparison: compared with previous ECG   Similar to previous ECG  Rhythm: sinus rhythm  Conduction: right bundle branch block  ST Segments: ST segments normal  T inversion: I  QRS axis: normal  Other: no other findings    Clinical impression: abnormal EKG              Assessment:       Diagnosis Plan   1. Coronary artery disease involving native coronary artery of native heart without angina pectoris  ECG 12 Lead   2. Chronic diastolic (congestive) heart failure (HCC)     3. Essential hypertension     4. Stage 4 chronic kidney disease (HCC)          Plan:     1.  Coronary Artery Disease  Assessment  • The patient has no angina    Subjective - Objective  • There is a history of previous coronary artery bypass graft 2/2020  • Current antiplatelet therapy includes aspirin 81 mg      She has exertional dyspnea and fatigue, and I feel it's multifactorial. She had a recent stress test that showed anterolateral ischemia, but it was not a large defect. Given her significant CKD, I feel we should continue with medical therapy and not pursue a cardiac catheterization. We will continue with amlodipine and metoprolol. We could consider ranolazine 500mg BID (cannot use 1000mg due to CKD), but she has pretty bad constipation at baseline.  We could consider nitrates as well.    2-4. Her volume status seems quite stable. She has minimal pedal edema, and her JVP is flat. I don't feel she needs more diuretics.     Sincerely,       Victor Hugo Ng MD

## 2023-09-15 NOTE — PROGRESS NOTE ADULT - ASSESSMENT
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 While in MICU patient noted with worsening renal failure requiring HD initiation, CGE/ Melena s/p EGD 8/31 found with esophagitis and erosive gastropathy, new right cerebellar infarct and fevers second to ESBL COLI and MSSA VAP, MSSA bacteremia, left ear otitis externa and drug rxn to cephalosporins Course further complicated by prolonged vent time s/p tracheostomy 9/1 and transferred to RCU 9/3 completed by recurrent fevers with high PIP, respiratory acidosis and concern for volume overloaded.     NEUROLOGY  # Metabolic Encephalopath vs NEW cerebella infarct   - Baseline MS AOX3 with short term memory changes per family however noted with concern for poor mentation in ICU  - MRI (8/17) with NEW right cerebellar infarct and old left PCA/occipital infarcts  - STEPHANIE (8/17) with AV showing two linear mobile echogenic elements attached to valve leaflets consistent with Lambel's excrescence, but no TRINITY thrombus, and lambel's excrescence with uncertain clinical implication.   - EEG (8/11-8/15) with no seizures   - ILR interrogation (9/7) with SR and PACs falsely recorded as AF.   - Attempted to resume ASA for medical management but held given GIB   - Continue on lipitor for medical management   - Course complicated by questionable head and body shaking 9/8 however prolactin elevated and shakes head yes/no to some questions.   - Lethargy more likely second to respiratory acidosis and will hold on RPT EEG    - Monitor mentation closely Awakens to verbal stimuli     CARDIOVASCULAR  # HTN Urgency   # Septic vs vasoplegic shock   - Labile BPs ranging in between SBP 80s-200s and attempted labetalol, however noted with hypotension and bradycardia likely from BB toxicity vs shock state?    - Holding Labetalol and Catapres   - Continue on Cardura for now however if BP soft preventing fluid removal then hold Cardura     # Hx of HFpEF with likely ADHF with volume overload.   - ECHO (7/2023) with EF 59 with severe LVH and diastolic dysfunction   - STEPHANIE (8/18) with normal LVRVSF however noted with increased LA pressures and persistent LA septal bowing into RA.   - ECHO (8/11) with EF 60 with mild LVH, normal LVRVSF, and mild ddfxn.  - Remove volume with HD sessions and intermittent bumex pushes as BP allows    - Bumex 2mg IV ordered 9/14- Monitor UOP    HEENT   # Left ear OE   - ENT evaluation (9/7) with no mastoid tenderness, however found with positive swelling and excoriation to left auricle and tenderness to manipulation of auricle. Debrided crusting of auricle and EAC evaluated with edema and unable to visualize TM. EAC debrided and found with watery exudate.   - Continue on CiproHC (9/5 - )   - Continue on Bradford (9/1 - ) as below   - ENT following and ear wick change QD   - Wick out but needs ? debridement wound care called/fu ent   - ENT recommending CT IAC w/ IV con but family is refusing as concerned given CKD, kidneys wouldn't recover from IV contrast. Spoke with Nephrology, ENT, and patient's  at length. Planned for CT non con and per , decision will be made from there but for now doesn't want to risk further harming kidneys.  - CT IAC showing acute on chronic left-sided otitis externa and acute on chronic left-sided otomastoiditis. Superimposed left-sided tympanosclerosis. Superimposed cholesteatoma formation within the left   tympanomastoid cavity cannot be excluded. No evidence of malignant otitis externa.    # Oral Lesions with questionable Zoster vs Trauma?   - Patient with tongue pain and seen with anterior ulcerations consolidating and crusting and posterior ulceration.  - Case discussed with pathology resident and culture/ cytology sent.   - HSV negative and Path (9/6) with normal appearing epithelial cells singly and in clusters devoid of viral cytopathic effect.   - Continue on magic mouth wash for pain    RESPIRATORY  # AHHRF second to volume overload   - Hx of CKD and HFpEF presented with SOB and hypercarbia second to volume overload requiring intubation and HD initiation   - Vent weaning attempted however limited requiring tracheostomy (9/1) with IP   - Course complicated by PIPs elevated (50s) and respiratory acidosis second to secretions vs volume ?    - Vent adjusted 20/300/5/30 without improvement.   - POCUS (9/8) with BL pleural effusions (large on right and small on left)   - CT CHEST (9/8) with TBM, BL pleural effusions and continued consolidations   - Continue on vent and given TBM increased PEEP (9/9) to 20/300/8/40 with overall improvement   - Continue on duonebs and HTS for airway clearance   - Continue volume removal with diuresis and aggressive UF sessions.   - Discussing possible thora but appears improved and will monitor.  ]  - Bedside US showing decrease in pleural effusion - hold off thoracentesis 9/10     GI  # UGIB   - CGE x 1 episode on 8/24 and melena x 2 episodes on 8/31 likely residual blood.   - EGD (8/31) found with esophagitis and erosive gastropathy  - Continue on PPI BID through late October and then PPI QD   - No melena     # Dysphagia   - NGT-TF continued   - Pending PEG however needs improvement in infectious status prior to placement.     RENAL  # Progressive CKD   - Presented volume overload and progressive RUSS on CKD (baseline CRE in 2s and mode into the 3s on admission) likely obstruction vs low flow state with shock vs AIN from drug reaction issue?  - POCUS with no signs of hydronephrosis   - Pressor support started with improvement in renal function  - Attempted steroid course in ICU without improvement in renal function   - Case discussed at length with renal and initiated on HD in ICU and now continued on HD TTHS, however remains volume overloaded.   - Patient oliguric however responds to Bumex pushes   - Continue on aggressive UF sessions with HD TTHS and bumex pushes as BP allows   - Monitor renal function and UOP     Hyponatremia  -Salt tabs ordered and Gave hypertonic 1.5%NS  - Renal- holding off on HD for now    # Hyperphosphatemia   - PTH normal and elevated phos likely from renal failure  - Phos binder with Renvela started with improvement.     INFECTIOUS DISEASE  # ESBL ECOLI and MSSA VAP c/b MSSA bacteremia   - RVP/ COVID (8/11) negative   - SCx (8/11) with MSSA s/p cefepime (8/12-8/13) and then ancef (8/14) however noted with drug reaction and then changed to vanco and aztreonam (8/12-8/13) in ICU .   - Course complicated by recurrent fevers and return of MSSA with bacteremia.   - SCx (9/1) with MSSA and ESBL ECOLI   - BAL (9/1) with MSSA   - BCx (9/1) with MSSA and cleared on (9/4)   - ECHO (9/6) unable to rule out endocarditis   - Continue on Bradford (9/4 - ) and Vanco BY DOSE POST HD (9/4, 9/7, 9/9 ...) with duration TBD at this time.   - Given inability to rule out endocarditis will discuss possible 4 wks?   - Course complicated by fever (9/7) and UA with leuks but no bacteria, however compared to prior improved, RVP/COVID and SCx negative, and RPT CXR similar to prior   - Pending BCx, SCx, UCx, and LE DOPPLERS- Blood and sputum cx NTD; Acute left deep vein thrombosis of the left popliteal vein.  - Febrile overnight 102 recultured and sent off   -Pt receiving vanco post HD however today given vanco 750mg x 1 will check Vanco level at end of HD session and dose   -     # Left ear OE   - ENT evaluation (9/7) with no mastoid tenderness, however found with positive swelling and excoriation to left auricle and tenderness to manipulation of auricle. Debrided crusting of auricle and EAC evaluated with edema and unable to visualize TM. EAC debrided and found with watery exudate.   - Continue on CiproHC (9/5 - )   - Continue on Bradford (9/1 - ) as below     # MRSA PCRs   - MRSA PCR (8/11 and 8/14) negative   - Next MRSA + PCR ordered for 10/8     HEME  # Anemia second to GIB vs renal disease   - Hold chemical DVT PPX given bleeding/ waxing and waning HH   - s/p multiple units of PRBCs (8/15, 8/21, 8/28, 8/31 and 9/8)   - Anemia panel with AOCD but with low %sat and ferrous sulfate added to optimize   - Monitor HH and discuss with renal for EPO sometime next week.   - SPoke with GI for clearance to start Heparin gtt for + DVT     Vascular  # DVT  - Pt with + popliteal DVT on SCD Spoke with GI no absolute contraindication for starting Heparin - advise close monitoring for bleeding - Do stat CTA if bleeding occurs and call IR   - Pt specific heparin gtt started with goal PTT 60-85      ONC   # Hx of Breast CA   - Patient dx in 2018 and s/p BL mastectomy (radical on right) and chemo and RT.   - Supportive care.     ENDOCRINE  # IDDM2   - Continue on NPH 3U and ISS   - Monitor FS and adjust as needed     LINES/ TUBES  - R ARTHUR TAYLOR (8/19 - )     SKIN  # Drug Eruption   - Attempted cefepime (8/12) vs ancef (8/13-8/14) and then noted with drug rxn.   - Hold further cephalosporins at this time   - Continue on steroids with prednisone 40 (8/18 - 9/2) then prednisone 30 (9/3-9/7), then prednisone 20 (9/8 - 9/10), then prednisone 10mg (9/11-9/13) then turn off.     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 While in MICU patient noted with worsening renal failure requiring HD initiation, CGE/ Melena s/p EGD 8/31 found with esophagitis and erosive gastropathy, new right cerebellar infarct and fevers second to ESBL COLI and MSSA VAP, MSSA bacteremia, left ear otitis externa and drug rxn to cephalosporins Course further complicated by prolonged vent time s/p tracheostomy 9/1 and transferred to RCU 9/3 completed by recurrent fevers with high PIP, respiratory acidosis and concern for volume overloaded.     NEUROLOGY  # Metabolic Encephalopath vs NEW cerebella infarct   - Baseline MS AOX3 with short term memory changes per family however noted with concern for poor mentation in ICU  - MRI (8/17) with NEW right cerebellar infarct and old left PCA/occipital infarcts  - STEPHNAIE (8/17) with AV showing two linear mobile echogenic elements attached to valve leaflets consistent with Lambel's excrescence, but no TRINITY thrombus, and lambel's excrescence with uncertain clinical implication.   - EEG (8/11-8/15) with no seizures   - ILR interrogation (9/7) with SR and PACs falsely recorded as AF.   - Attempted to resume ASA for medical management but held given GIB   - Continue on Lipitor for medical management   - Course complicated by questionable head and body shaking 9/8 however prolactin elevated and shakes head yes/no to some questions.   - Lethargy more likely second to respiratory acidosis and will hold on RPT EEG    - Monitor mentation closely Awakens to verbal stimuli     CARDIOVASCULAR  # HTN Urgency   # Septic vs vasoplegic shock   - Labile BPs ranging in between SBP 80s-200s and attempted labetalol, however noted with hypotension and bradycardia likely from BB toxicity vs shock state?    - Holding Labetalol and Catapres   - c/w Cardura for now     # Hx of HFpEF with likely ADHF with volume overload.   - ECHO (7/2023) with EF 59 with severe LVH and diastolic dysfunction   - STEPHANIE (8/18) with normal LVRVSF however noted with increased LA pressures and persistent LA septal bowing into RA.   - ECHO (8/11) with EF 60 with mild LVH, normal LVRVSF, and mild ddfxn.  - Remove volume with HD sessions and intermittent bumex pushes as BP allows    - c/w Bumex 2mg IV BID for now - Monitor UOP and electrolytes     HEENT   # Left ear OE   - ENT evaluation (9/7) with no mastoid tenderness, however found with positive swelling and excoriation to left auricle and tenderness to manipulation of auricle. Debrided crusting of auricle and EAC evaluated with edema and unable to visualize TM. EAC debrided and found with watery exudate.   - Continue on CiproHC (9/5 - )   - Continue on Bradford (9/1 - ) as below   - ENT following and ear wick change QD   - Wick out but needs ? debridement wound care called/fu ent   - ENT recommending CT IAC w/ IV con but family is refusing as concerned given CKD, kidneys wouldn't recover from IV contrast. Spoke with Nephrology, ENT, and patient's  at length. Planned for CT non con and per , decision will be made from there but for now doesn't want to risk further harming kidneys.  - CT IAC showing acute on chronic left-sided otitis externa and acute on chronic left-sided otomastoiditis. Superimposed left-sided tympanosclerosis. Superimposed cholesteatoma formation within the left   tympanomastoid cavity cannot be excluded. No evidence of malignant otitis externa.    # Oral Lesions with questionable Zoster vs Trauma?   - Patient with tongue pain and seen with anterior ulcerations consolidating and crusting and posterior ulceration.  - Case discussed with pathology resident and culture/ cytology sent.   - HSV negative and Path (9/6) with normal appearing epithelial cells singly and in clusters devoid of viral cytopathic effect.   - Continue on magic mouth wash for pain    RESPIRATORY  # AHHRF second to volume overload   - Hx of CKD and HFpEF presented with SOB and hypercarbia second to volume overload requiring intubation and HD initiation   - Vent weaning attempted however limited requiring tracheostomy (9/1) with IP   - Course complicated by PIPs elevated (50s) and respiratory acidosis second to secretions vs volume ?    - Vent adjusted 20/300/5/30 without improvement.   - POCUS (9/8) with BL pleural effusions (large on right and small on left)   - CT CHEST (9/8) with TBM, BL pleural effusions and continued consolidations   - Continue on vent and given TBM increased PEEP (9/9) to 20/300/8/40 with overall improvement   - Continue on duonebs and HTS for airway clearance   - Continue volume removal with diuresis and aggressive UF sessions.   - Discussing possible thora but appears improved and will monitor.  ]  - Bedside US showing decrease in pleural effusion - hold off thoracentesis 9/10     GI  # UGIB   - CGE x 1 episode on 8/24 and melena x 2 episodes on 8/31 likely residual blood.   - EGD (8/31) found with esophagitis and erosive gastropathy  - Continue on PPI BID through late October and then PPI QD   - No melena     # Dysphagia   - NGT-TF continued   - Pending PEG however needs improvement in infectious status prior to placement.     RENAL  # Progressive CKD   - Presented volume overload and progressive RUSS on CKD (baseline CRE in 2s and mode into the 3s on admission) likely obstruction vs low flow state with shock vs AIN from drug reaction issue?  - POCUS with no signs of hydronephrosis   - Pressor support started with improvement in renal function  - Attempted steroid course in ICU without improvement in renal function   - Case discussed at length with renal and initiated on HD in ICU and now continued on HD TTHS, however remains volume overloaded.   - Patient oliguric however responds to Bumex pushes   - Continue on aggressive UF sessions with HD TTHS and bumex pushes as BP allows   - Monitor renal function and UOP     Hyponatremia  - c/w Salt tabs - s/p Hypertonic 1.5%NS @ 50cc/hr x 5 hr  - Renal- holding off on HD for now      # Hyperphosphatemia (resolved)   - PTH normal and elevated phos likely from renal failure  - s/p Phos binder with Renvela - now d'beth as level wnl      INFECTIOUS DISEASE  # ESBL ECOLI and MSSA VAP c/b MSSA bacteremia   - RVP/ COVID (8/11) negative   - SCx (8/11) with MSSA s/p cefepime (8/12-8/13) and then ancef (8/14) however noted with drug reaction and then changed to vanco and aztreonam (8/12-8/13) in ICU .   - Course complicated by recurrent fevers and return of MSSA with bacteremia.   - SCx (9/1) with MSSA and ESBL ECOLI   - BAL (9/1) with MSSA   - BCx (9/1) with MSSA and cleared on (9/4)   - ECHO (9/6) unable to rule out endocarditis   - Continue on Bradford (9/4 - ) and Vanco BY DOSE POST HD (9/4, 9/7, 9/9 ...) with duration TBD at this time.   - Given inability to rule out endocarditis will discuss possible 4 wks?   - Course complicated by fever (9/7) and UA with leuks but no bacteria, however compared to prior improved, RVP/COVID and SCx negative, and RPT CXR similar to prior   - Pending BCx, SCx, UCx, and LE DOPPLERS- Blood and sputum cx NTD; Acute left deep vein thrombosis of the left popliteal vein.  - Febrile overnight 102 recultured and sent off   -Pt receiving vanco post HD however today given vanco 750mg x 1 will check Vanco level at end of HD session and dose   -     # Left ear OE   - ENT evaluation (9/7) with no mastoid tenderness, however found with positive swelling and excoriation to left auricle and tenderness to manipulation of auricle. Debrided crusting of auricle and EAC evaluated with edema and unable to visualize TM. EAC debrided and found with watery exudate.   - Continue on CiproHC (9/5 - )   - Continue on Bradford (9/1 - ) as below     # MRSA PCRs   - MRSA PCR (8/11 and 8/14) negative   - Next MRSA + PCR ordered for 10/8     HEME  # Anemia second to GIB vs renal disease   - Hold chemical DVT PPX given bleeding/ waxing and waning HH   - s/p multiple units of PRBCs (8/15, 8/21, 8/28, 8/31 and 9/8)   - Anemia panel with AOCD but with low %sat and ferrous sulfate added to optimize   - Monitor HH and discuss with renal for EPO sometime next week.   - SPoke with GI for clearance to start Heparin gtt for + DVT     Vascular  # DVT  - Pt with + popliteal DVT on SCD Spoke with GI no absolute contraindication for starting Heparin - advise close monitoring for bleeding - Do stat CTA if bleeding occurs and call IR   - Pt specific heparin gtt started with goal PTT 60-85  - will monitor closely     ONC   # Hx of Breast CA   - Patient dx in 2018 and s/p BL mastectomy (radical on right) and chemo and RT.   - Supportive care.     ENDOCRINE  # IDDM2   - Continue on NPH 3U and ISS   - Monitor FS and adjust as needed     LINES/ TUBES  - R ARTHUR TAYLOR (8/19 - )     SKIN  # Drug Eruption   - Attempted cefepime (8/12) vs ancef (8/13-8/14) and then noted with drug rxn.   - Hold further cephalosporins at this time   - s/p Steroids with prednisone 40 (8/18 - 9/2) then prednisone 30 (9/3-9/7), then prednisone 20 (9/8 - 9/10), then prednisone 10mg (9/11-9/13) then turn off.     ETHICS/ GOC    - FULL CODE     DISPO - TBD

## 2023-09-15 NOTE — PROGRESS NOTE ADULT - SUBJECTIVE AND OBJECTIVE BOX
CHIEF COMPLAINT: Patient is a 73y old  Female who presents with a chief complaint of Respiratory distress (15 Sep 2023 08:59)      Interval Events:    REVIEW OF SYSTEMS:  [ ] All other systems negative  [ ] Unable to assess ROS because ________    Mode: AC/ CMV (Assist Control/ Continuous Mandatory Ventilation), RR (machine): 20, TV (machine): 300, FiO2: 30, PEEP: 8, ITime: 0.53, MAP: 15, PIP: 31      OBJECTIVE:  ICU Vital Signs Last 24 Hrs  T(C): 37.6 (15 Sep 2023 04:00), Max: 38.2 (15 Sep 2023 00:00)  T(F): 99.7 (15 Sep 2023 04:00), Max: 100.8 (15 Sep 2023 00:00)  HR: 100 (15 Sep 2023 07:49) (92 - 108)  BP: 101/63 (15 Sep 2023 04:00) (101/63 - 149/48)  BP(mean): 74 (15 Sep 2023 00:00) (69 - 74)  ABP: --  ABP(mean): --  RR: 20 (15 Sep 2023 04:00) (20 - 26)  SpO2: 98% (15 Sep 2023 07:49) (94% - 100%)    O2 Parameters below as of 15 Sep 2023 04:45  Patient On (Oxygen Delivery Method): ventilator          Mode: AC/ CMV (Assist Control/ Continuous Mandatory Ventilation), RR (machine): 20, TV (machine): 300, FiO2: 30, PEEP: 8, ITime: 0.53, MAP: 15, PIP: 31    09-14 @ 07:01  -  09-15 @ 07:00  --------------------------------------------------------  IN: 889.5 mL / OUT: 190 mL / NET: 699.5 mL      CAPILLARY BLOOD GLUCOSE      POCT Blood Glucose.: 244 mg/dL (15 Sep 2023 05:43)      HOSPITAL MEDICATIONS:  MEDICATIONS  (STANDING):  albuterol/ipratropium for Nebulization 3 milliLiter(s) Nebulizer every 6 hours  atorvastatin 40 milliGRAM(s) Oral at bedtime  chlorhexidine 0.12% Liquid 15 milliLiter(s) Oral Mucosa every 12 hours  chlorhexidine 2% Cloths 1 Application(s) Topical daily  ciprofloxacin/hydrocortisone Suspension Otic 4 Drop(s) Left Ear two times a day  dextrose 5%. 1000 milliLiter(s) (50 mL/Hr) IV Continuous <Continuous>  dextrose 5%. 1000 milliLiter(s) (100 mL/Hr) IV Continuous <Continuous>  dextrose 50% Injectable 12.5 Gram(s) IV Push once  dextrose 50% Injectable 25 Gram(s) IV Push once  dextrose 50% Injectable 25 Gram(s) IV Push once  doxazosin 6 milliGRAM(s) Oral <User Schedule>  epoetin odalis (EPOGEN) Injectable 58176 Unit(s) SubCutaneous <User Schedule>  ferrous    sulfate Liquid 300 milliGRAM(s) Enteral Tube daily  FIRST- Mouthwash  BLM 10 milliLiter(s) Swish and Spit four times a day  glucagon  Injectable 1 milliGRAM(s) IntraMuscular once  heparin  Infusion 1000 Unit(s)/Hr (9.5 mL/Hr) IV Continuous <Continuous>  insulin lispro (ADMELOG) corrective regimen sliding scale   SubCutaneous every 6 hours  insulin NPH human recombinant 3 Unit(s) SubCutaneous every 6 hours  meropenem  IVPB 500 milliGRAM(s) IV Intermittent every 12 hours  mupirocin 2% Ointment 1 Application(s) Topical two times a day  mupirocin 2% Ointment 1 Application(s) Both Nostrils two times a day  pantoprazole  Injectable 40 milliGRAM(s) IV Push two times a day  psyllium Powder 1 Packet(s) Oral daily  sevelamer carbonate Powder 800 milliGRAM(s) Oral three times a day  sodium chloride 2 Gram(s) Oral two times a day  sodium chloride 1.5%. 500 milliLiter(s) (50 mL/Hr) IV Continuous <Continuous>  sodium chloride 3%  Inhalation 4 milliLiter(s) Inhalation every 6 hours    MEDICATIONS  (PRN):  acetaminophen   Oral Liquid .. 650 milliGRAM(s) Oral every 6 hours PRN Temp greater or equal to 38C (100.4F), Mild Pain (1 - 3)  dextrose Oral Gel 15 Gram(s) Oral once PRN Blood Glucose LESS THAN 70 milliGRAM(s)/deciliter      LABS:                        7.5    6.16  )-----------( 169      ( 15 Sep 2023 01:37 )             22.9     09-15    123<L>  |  88<L>  |  88<H>  ----------------------------<  229<H>  4.0   |  22  |  2.59<H>    Ca    7.7<L>      15 Sep 2023 01:37  Phos  3.0     09-15  Mg     2.40     09-15      PTT - ( 15 Sep 2023 01:37 )  PTT:79.6 sec  Urinalysis Basic - ( 15 Sep 2023 01:37 )    Color: x / Appearance: x / SG: x / pH: x  Gluc: 229 mg/dL / Ketone: x  / Bili: x / Urobili: x   Blood: x / Protein: x / Nitrite: x   Leuk Esterase: x / RBC: x / WBC x   Sq Epi: x / Non Sq Epi: x / Bacteria: x            PAST MEDICAL & SURGICAL HISTORY:  Breast CA      Diabetes      Stroke      Cardiac arrest      HTN (hypertension)      H/O mastectomy, bilateral          FAMILY HISTORY:  No pertinent family history in first degree relatives        Social History:      RADIOLOGY:  [ ] Reviewed and interpreted by me    PULMONARY FUNCTION TESTS:    EKG: CHIEF COMPLAINT: Patient is a 73y old  Female who presents with a chief complaint of Respiratory distress (15 Sep 2023 08:59)    Interval Events: None reported overnight. Clinically unchanged. ID and Nephro following. Will continue with current Abx regimen. No HD today as per Nephro. VSS, and medications reviewed.     REVIEW OF SYSTEMS:  See above   [x] Unable to assess ROS because pt is vented/poor mental status     Mode: AC/ CMV (Assist Control/ Continuous Mandatory Ventilation), RR (machine): 20, TV (machine): 300, FiO2: 30, PEEP: 8, ITime: 0.53, MAP: 15, PIP: 31    OBJECTIVE:  ICU Vital Signs Last 24 Hrs  T(C): 37.6 (15 Sep 2023 04:00), Max: 38.2 (15 Sep 2023 00:00)  T(F): 99.7 (15 Sep 2023 04:00), Max: 100.8 (15 Sep 2023 00:00)  HR: 100 (15 Sep 2023 07:49) (92 - 108)  BP: 101/63 (15 Sep 2023 04:00) (101/63 - 149/48)  BP(mean): 74 (15 Sep 2023 00:00) (69 - 74)  ABP: --  ABP(mean): --  RR: 20 (15 Sep 2023 04:00) (20 - 26)  SpO2: 98% (15 Sep 2023 07:49) (94% - 100%)    O2 Parameters below as of 15 Sep 2023 04:45  Patient On (Oxygen Delivery Method): ventilator    Mode: AC/ CMV (Assist Control/ Continuous Mandatory Ventilation), RR (machine): 20, TV (machine): 300, FiO2: 30, PEEP: 8, ITime: 0.53, MAP: 15, PIP: 31    09-14 @ 07:01  -  09-15 @ 07:00  --------------------------------------------------------  IN: 889.5 mL / OUT: 190 mL / NET: 699.5 mL    CAPILLARY BLOOD GLUCOSE    POCT Blood Glucose.: 244 mg/dL (15 Sep 2023 05:43)    HOSPITAL MEDICATIONS:  MEDICATIONS  (STANDING):  albuterol/ipratropium for Nebulization 3 milliLiter(s) Nebulizer every 6 hours  atorvastatin 40 milliGRAM(s) Oral at bedtime  chlorhexidine 0.12% Liquid 15 milliLiter(s) Oral Mucosa every 12 hours  chlorhexidine 2% Cloths 1 Application(s) Topical daily  ciprofloxacin/hydrocortisone Suspension Otic 4 Drop(s) Left Ear two times a day  dextrose 5%. 1000 milliLiter(s) (50 mL/Hr) IV Continuous <Continuous>  dextrose 5%. 1000 milliLiter(s) (100 mL/Hr) IV Continuous <Continuous>  dextrose 50% Injectable 12.5 Gram(s) IV Push once  dextrose 50% Injectable 25 Gram(s) IV Push once  dextrose 50% Injectable 25 Gram(s) IV Push once  doxazosin 6 milliGRAM(s) Oral <User Schedule>  epoetin odalis (EPOGEN) Injectable 86394 Unit(s) SubCutaneous <User Schedule>  ferrous    sulfate Liquid 300 milliGRAM(s) Enteral Tube daily  FIRST- Mouthwash  BLM 10 milliLiter(s) Swish and Spit four times a day  glucagon  Injectable 1 milliGRAM(s) IntraMuscular once  heparin  Infusion 1000 Unit(s)/Hr (9.5 mL/Hr) IV Continuous <Continuous>  insulin lispro (ADMELOG) corrective regimen sliding scale   SubCutaneous every 6 hours  insulin NPH human recombinant 3 Unit(s) SubCutaneous every 6 hours  meropenem  IVPB 500 milliGRAM(s) IV Intermittent every 12 hours  mupirocin 2% Ointment 1 Application(s) Topical two times a day  mupirocin 2% Ointment 1 Application(s) Both Nostrils two times a day  pantoprazole  Injectable 40 milliGRAM(s) IV Push two times a day  psyllium Powder 1 Packet(s) Oral daily  sevelamer carbonate Powder 800 milliGRAM(s) Oral three times a day  sodium chloride 2 Gram(s) Oral two times a day  sodium chloride 1.5%. 500 milliLiter(s) (50 mL/Hr) IV Continuous <Continuous>  sodium chloride 3%  Inhalation 4 milliLiter(s) Inhalation every 6 hours    MEDICATIONS  (PRN):  acetaminophen   Oral Liquid .. 650 milliGRAM(s) Oral every 6 hours PRN Temp greater or equal to 38C (100.4F), Mild Pain (1 - 3)  dextrose Oral Gel 15 Gram(s) Oral once PRN Blood Glucose LESS THAN 70 milliGRAM(s)/deciliter    PHYSICAL EXAM  GENERAL: Laying in bed comfortably, NAD  HEENT: +Trach, area c/d/i, +L ear purulent drainage noted  PULM: +Normal resp effort, + Rhonchi b/l, no w appreciated   HEART: +s1, s2  GI: +BS, soft, nt/nd, no peritoneal signs noted  MSK: No asymmetry, LIZZIE in upper extremities. RLE weakness- baseline. t  NEURO: Eyes opens spontaneously, follow simple commands    LABS:                        7.5    6.16  )-----------( 169      ( 15 Sep 2023 01:37 )             22.9     09-15    123<L>  |  88<L>  |  88<H>  ----------------------------<  229<H>  4.0   |  22  |  2.59<H>    Ca    7.7<L>      15 Sep 2023 01:37  Phos  3.0     09-15  Mg     2.40     09-15      PTT - ( 15 Sep 2023 01:37 )  PTT:79.6 sec  Urinalysis Basic - ( 15 Sep 2023 01:37 )    Color: x / Appearance: x / SG: x / pH: x  Gluc: 229 mg/dL / Ketone: x  / Bili: x / Urobili: x   Blood: x / Protein: x / Nitrite: x   Leuk Esterase: x / RBC: x / WBC x   Sq Epi: x / Non Sq Epi: x / Bacteria: x    PAST MEDICAL & SURGICAL HISTORY:  Breast CA    Diabetes    Stroke    Cardiac arrest    HTN (hypertension)    H/O mastectomy, bilateral    FAMILY HISTORY:  No pertinent family history in first degree relatives    Social History:    RADIOLOGY:  [ ] Reviewed and interpreted by me    PULMONARY FUNCTION TESTS:    EKG:

## 2023-09-16 NOTE — PROGRESS NOTE ADULT - ASSESSMENT
73 F with DM, HTN, CKD, breast CA, latent TB (treated first with INH then had rash and switched to rifampin), MSSA bacteremia, c-diff, respiratory arrest and cardiac arrest (2018), s/p trach/PEG then removed, CVA with residual weakness, aspiration PNA, 1/4 sputums had MAC 7/2023, admitted 8/11 with respiratory failure no fever or WBC but was intubated and then spiked  blood cx negative, sputum cx with MSSA  s/p vanco and aztreonam 8/11=> s/p cefepime 8/12 and had ?rash => s/p cefazolin 8/13-8/14  head MRI 8/17 with acute cerebellar infarct  Had renal failure, AIN? family declined renal biopsy started on prednisone  S/p trach 9/1 and BAL with MSSA   ET cx with ESBL and MSSA  blood cx 9/1: MSSA   repeat negative 9/4, 9/8  CXR with b/l opacities, R increased  Ct showed acute on chronic otitis externa and otomastoiditis , superimposed cholesteatoma can not be excluded, no malignant otitis externa  L otitis externa on ana cristina since 9/1 but worsening edema and black discoloration with bullae forming and persistent fever   Suspected Cefepime allergy  Overall, MSSA bacteremia, otitis externa  - Vanco by level, check daily levels, redose when <15--agree redose today  - Meropenem 500mg q 12 (order dropped off, would reorder)  - F/U pending cultures  - Any further OR/procedure per ENT  - Anticipate will need 4 week course Vanco for MSSA  - Trend CBC/CMPs  - Monitor for alternate signs infection  - Transient MSSA, can hold on STEPHANIE for now    Yossi Durant MD  Contact on TEAMS messaging from 9am - 5pm  From 5pm-9am, on weekends, or if no response call [phone]

## 2023-09-16 NOTE — PROGRESS NOTE ADULT - NS ATTEND AMEND GEN_ALL_CORE FT
Pt is a 73F with PMHx IDDMII, CKD, hx CVA, CHFpEF, latent TB (s/p tx,) hx breast cancer (2018, s/p mastectomy and adjuvant CT/RT), and hx CVA s/p trach/PEG (now decannulated) initially presenting to [facility] on 8/11/23 with acute hypoxemic and hypercapnic respiratory failure s/p ETT intubation/MV and ARF with pulmonary edema c/b HTN emergency requiring nicardipine drip transferred to MICU for further management.     Pt initially p/w worsening RUE shaking and dysconjugate gaze with MRI 8/17 (+) new right cerebellar, midbrain, and multiple tiny embolic infarcts as well as known left PCA CVA. EEG (-). 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 ILR in place from prior CVA evaluation, last interrogated 9/7 without AF. At baseline, pt reportedly wheelchair bound with RUE tremors and some ADL assistance. Pt was unable to tolerate ASA 2/2 GIB, will re-attempt once more stable.     Pt with acute hypoxemic and hypercapnic respiratory failure s/p ETT intubation/MV possibly 2/2 flash pulmonary edema in the setting of HTN emergency +/- aspiration event. Pt with failure of ventilatory weaning now s/p tracheostomy placement (IP 9/1 with sutures now removed). Of note, pt also noted to have evidence of midbrain CVA with possible concern for central effect on respiratory drive, will monitor carefully and continue weaning trials as tolerated. Pt able to spontaneously breath intermittently, will continue to attempt further weaning trials. Airway clearance therapy in place. Wean O2 supplementation for goal O2 saturation 90-95%. Oral hygiene and aspiration precautions in place. Trach care and suctioning as per RCU team.     Pt p/w ARF on CKD requiring initiation of HD 2/2 pulmonary edema and metabolic acidosis with metabolic encephalopathy. No urgent indication for HD today, further HD as per nephrology team. Finishing prednisone taper for possible AIN. Strict I/Os, pt makes small amount of urine. Now with acute hypervolemic hyponatremia in the setting of renal failure, partially response to diuretics. Agree with nephrology recs, added salt tabs and hypertonic saline x1 9/14 with repeat labs showing uptrending hyponatremia. Will c/w diuretics + salt tabs with BMP q12hr.     Hospital course further c/b recurrent infections, most recently with MSSA bacteremia (9/1, cleared 9/4 and 9/8) with (+) MSSA and ESBL EColi in BAL as well. Pt with possible cefepime vs. cefazolin drug-induced rash followed by transition to vancomycin. Was then found to have left otitis externa (9/5) now requiring serial bedside debridement with worsening necrotic tissue/erythema for which she was also initiated on meropenem. CTTB pending, family refusing contrast, non-contrast study obtained with evidence of bone involvement but no indication for further surgical intervention. Cultures NTD, pt remains febrile intermittently. Will continue with meropenem, duration to be determined given concern for osteo. Ciprodex otic drops x10 day course. Plan to complete vancomycin by level x4 week course through 10/2 (given inability to exclude endocarditis).     Pt also with oropharyngeal dysphagia requiring NGTs followed by UGIB s/p EGD (8/31) found to have esophagitis/erosive gastropathy now on PPI BID. Pt further found to have popliteal DVT. No c/i by GI for A/C, pt initiated on heparin drip with patient-specific pTTs. Will need to ultimately transition to Coumadin x3-6 months after PEG placement.      Dispo pending medical optimization. Pt full code. DVT ppx full A/C with heparin drip. Discussed with pt's family ( and daughter) at bedside daily. as above:  Pt is a 73F with PMHx IDDMII, CKD, hx CVA, CHFpEF, latent TB (s/p tx,) hx breast cancer (2018, s/p mastectomy and adjuvant CT/RT), and hx CVA s/p trach/PEG (now decannulated) initially presenting to [facility] on 8/11/23 with acute hypoxemic and hypercapnic respiratory failure s/p ETT intubation/MV and ARF with pulmonary edema c/b HTN emergency requiring nicardipine drip transferred to MICU for further management.     Pt initially p/w worsening RUE shaking and dysconjugate gaze with MRI 8/17 (+) new right cerebellar, midbrain, and multiple tiny embolic infarcts as well as known left PCA CVA. EEG (-). 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 ILR in place from prior CVA evaluation, last interrogated 9/7 without AF. At baseline, pt reportedly wheelchair bound with RUE tremors and some ADL assistance. Pt was unable to tolerate ASA 2/2 GIB, will re-attempt once more stable.     Pt with acute hypoxemic and hypercapnic respiratory failure s/p ETT intubation/MV possibly 2/2 flash pulmonary edema in the setting of HTN emergency +/- aspiration event. Pt with failure of ventilatory weaning now s/p tracheostomy placement (IP 9/1 with sutures now removed). Of note, pt also noted to have evidence of midbrain CVA with possible concern for central effect on respiratory drive, will monitor carefully and continue weaning trials as tolerated. Pt able to spontaneously breath intermittently, will continue to attempt further weaning trials. Airway clearance therapy in place. Wean O2 supplementation for goal O2 saturation 90-95%. Oral hygiene and aspiration precautions in place. Trach care and suctioning as per RCU team.     Pt p/w ARF on CKD requiring initiation of HD 2/2 pulmonary edema and metabolic acidosis with metabolic encephalopathy. No urgent indication for HD today, further HD as per nephrology team. Finishing prednisone taper for possible AIN. Strict I/Os, pt makes small amount of urine. Now with acute hypervolemic hyponatremia in the setting of renal failure, partially response to diuretics. Agree with nephrology recs, added salt tabs and hypertonic saline x1 9/14 with repeat labs showing uptrending hyponatremia. Will c/w diuretics + salt tabs with BMP q12hr. Renal following    Hospital course further c/b recurrent infections, most recently with MSSA bacteremia (9/1, cleared 9/4 and 9/8) with (+) MSSA and ESBL EColi in BAL as well. Pt with possible cefepime vs. cefazolin drug-induced rash followed by transition to vancomycin. Was then found to have left otitis externa (9/5) now requiring serial bedside debridement with worsening necrotic tissue/erythema for which she was also initiated on meropenem. CTTB pending, family refusing contrast, non-contrast study obtained with evidence of bone involvement but no indication for further surgical intervention. Cultures NTD, pt remains febrile intermittently. Will continue with meropenem, duration to be determined given concern for osteo. Ciprodex otic drops x10 day course. Plan to complete vancomycin by level x4 week course through 10/2 (given inability to exclude endocarditis).     Pt also with oropharyngeal dysphagia requiring NGTs followed by UGIB s/p EGD (8/31) found to have esophagitis/erosive gastropathy now on PPI BID. Pt further found to have popliteal DVT. No c/i by GI for A/C, pt initiated on heparin drip with patient-specific pTTs. Will need to ultimately transition to Coumadin x3-6 months after PEG placement.      Dispo pending medical optimization. Pt full code. DVT ppx full A/C with heparin drip. Discussed with pt's family ( and daughter) at bedside daily.  Travis Stratton MD-Pulmonary   [phone]

## 2023-09-16 NOTE — PROGRESS NOTE ADULT - ASSESSMENT
72 y/o F well known to me from my housecal outptn practice. she was admitted at [facility] 7/12-7/22 w aspiration PNA, was treated w CEFEPIME, developed an allergic rash,  dCHF, + MAC on AFB culture, had been progressively getting more and more lethargic and dyspneic at home since DC.   In  am of 8/11/23  ptn presented with respiratory distress w hypoxia and hypercarbia requiring intubation 2/2 volume overload +/- Asp PNA      Neuro   responds appropriately   Baseline MS AOx3, aphasic   - h/o CVA , on aspirin and statin . 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 Mcnulty, now in RCU  s/p septic shock overnight 9/1, now on meropenem, HDS  s/p trache, on vent support, copious secretions      Renal   Hyperkalemia with EKG changes  , initially treated w LOKELMA,  now resolved   Ptn well know to Dr. Colon, consult reviewed    HD 8/19,  8/21, 8/26 and 8/28.  s/p PUF 8/29 2.5 Liters, HD 8/30,  9/1, 9/4  started chronic HD 9/7,   cardura resumed  no HD today, on diuretics      GI  NPO  on tube feeds  coffee ground emesis x 1 8/24, melena overnight 8/31, s/p 1UPRBC, EGD/Colonoscopy: erosive gastropathy, esophagisits, on colonoscopy old blood seen no active bleeding, poor prep  family to decide re PEG placement    Endocrine  T2DM   A1C 7.1 in 7/2023   - BG goal 140-200     ID   No fever/leukocytosis, recheck temp   Hx latent TB which was treated, no concern for TB   - grew MAC on AFB culture from most recent admission. at [facility]  -MSSA Bacteremia 9/1, allergic to cephalosprins and PCN, on Vanco as per ID, renal dosing,   - sputum also grew E.Coli-ESBL, as per ID recs for  Bradford through 9/7( 1 week course as per ID) , afebrile.  - 9/8:  spike  temp 38.1C, has O. externa, has a wick, recs are to get a CT to R/O an abscess/bony involvement. cont Meropenem  9/9: ptn w fever overnight to 100.8,  may need shiley pulled if bacteremic, needs NECK CT as per ENT to R/O Mastoid bone involvement while having ongoing purulent DC from L ear 2/2 O. externa, getting daily wick changes  9/10:  spiked 102 overnight through Bradford and Vanco, purulent DC from O. externa, ENT recommend Ct of mastoid. ptn in HD, has Shiley in place, consider pulling shiley and send for Cx. would d/w Renal, and consider contacting  ID, last seen by Dr. Conner 9/6 9/11: remains febrile, Dr. Conner reconsulted. cont Bradford, Vanco  9/12: tmax 100.5, fever curve is improving, awaiting Left ear CT, on Bradford since 9/1. on  vanco for MSSA Bacteremia, does not tolerate cephalosporins. through 10/2  9/13: on full vent support via trache, appears uncomfortable and onur 2/2 Left O. externa. has an incidental left middle ear tumor, no acute middle ear interventions recommended, left ear wick replaced. on Meropenem, cx from Ear DC is neg. On Vanco for MSSA bacteremia through 10/2, course of Meropenem as per ID, afebrile in the past 24 hrs . Cardura for HTN resumed, HGB dropped to 6.4, got a dose of EPO and 1UPRBC, rpt 7.8, remains on HEPARIN drip for LEFT popliteal DVT  9/14: cont on Mupirocin locally to Left ear, cont IV Bradford, ENT signed off, afebrile x 48 hrs, Mro course to be determined by ID, on Vanco for MSSA bacteremia through 10/2. ongoing worsening hyponatremia, Hypertonic saline as per renal, no HD today, s/p 1UPRBC 9/13  9/15: spiking temps again, if cont to spike as per ID re PAN scan. cont Vanco for MSSA Bacteremia  9/16: no temp overnight      Heme/Onc  ppx: place on SCD  Transfuse for hgb 6.4, no obv bleed. HDS  LEFT POPLITEAL VEIN ACUTE DVT on 9/10    Ethics  GOC - Discussed GOC with daughter and , they have opted in the past for full code. and she remains full code at present

## 2023-09-16 NOTE — PROGRESS NOTE ADULT - ASSESSMENT
seen and examined at bedside  trached  NAD   at bedside    acetaminophen   Oral Liquid .. 650 milliGRAM(s) Oral every 6 hours PRN  albuterol/ipratropium for Nebulization 3 milliLiter(s) Nebulizer every 6 hours  atorvastatin 40 milliGRAM(s) Oral at bedtime  buMETAnide Injectable 2 milliGRAM(s) IV Push two times a day  chlorhexidine 0.12% Liquid 15 milliLiter(s) Oral Mucosa every 12 hours  chlorhexidine 2% Cloths 1 Application(s) Topical daily  ciprofloxacin/hydrocortisone Suspension Otic 4 Drop(s) Left Ear two times a day  dextrose 5%. 1000 milliLiter(s) IV Continuous <Continuous>  dextrose 5%. 1000 milliLiter(s) IV Continuous <Continuous>  dextrose 50% Injectable 12.5 Gram(s) IV Push once  dextrose 50% Injectable 25 Gram(s) IV Push once  dextrose 50% Injectable 25 Gram(s) IV Push once  dextrose Oral Gel 15 Gram(s) Oral once PRN  doxazosin 6 milliGRAM(s) Oral <User Schedule>  epoetin odalis (EPOGEN) Injectable 80782 Unit(s) SubCutaneous <User Schedule>  ferrous    sulfate Liquid 300 milliGRAM(s) Enteral Tube daily  FIRST- Mouthwash  BLM 10 milliLiter(s) Swish and Spit four times a day  glucagon  Injectable 1 milliGRAM(s) IntraMuscular once  heparin  Infusion 1000 Unit(s)/Hr IV Continuous <Continuous>  insulin lispro (ADMELOG) corrective regimen sliding scale   SubCutaneous every 6 hours  insulin NPH human recombinant 3 Unit(s) SubCutaneous every 6 hours  mupirocin 2% Ointment 1 Application(s) Both Nostrils two times a day  pantoprazole  Injectable 40 milliGRAM(s) IV Push two times a day  psyllium Powder 1 Packet(s) Oral daily  sodium chloride 2 Gram(s) Oral two times a day  sodium chloride 3%  Inhalation 4 milliLiter(s) Inhalation every 6 hours  vancomycin  IVPB 750 milliGRAM(s) IV Intermittent once      VITAL:  T(C): , Max: 37.8 (09-15-23 @ 15:01)  T(F): , Max: 100.1 (09-15-23 @ 15:01)  HR: 95 (09-16-23 @ 08:35)  BP: 116/45 (09-16-23 @ 05:35)  BP(mean): --  RR: 20 (09-16-23 @ 05:35)  SpO2: 95% (09-16-23 @ 08:35)  Wt(kg): --    09-15-23 @ 07:01  -  09-16-23 @ 07:00  --------------------------------------------------------  IN: 954 mL / OUT: 1100 mL / NET: -146 mL        PHYSICAL EXAM:  Constitutional: trach to vent  HEENT: + tracheostomy, + NGT  Respiratory: Coarse BS  Cardiovascular: S1 and S2  Gastrointestinal: BS+, soft, NT/ND  Extremities: + peripheral edema  Neurological: UTO  : +external catheter   Skin: No rashes  Access: [facility] HD catheter (8/19)    LABS:                          7.3    5.02  )-----------( 164      ( 16 Sep 2023 06:00 )             22.5     Na(123)/K(4.1)/Cl(89)/HCO3(22)/BUN(92)/Cr(2.63)Glu(222)/Ca(8.1)/Mg(2.40)/PO4(3.2)    09-16 @ 06:00  Na(123)/K(4.4)/Cl(88)/HCO3(24)/BUN(90)/Cr(2.59)Glu(211)/Ca(8.0)/Mg(2.50)/PO4(3.2)    09-15 @ 16:25  Na(123)/K(4.0)/Cl(88)/HCO3(22)/BUN(88)/Cr(2.59)Glu(229)/Ca(7.7)/Mg(2.40)/PO4(3.0)    09-15 @ 01:37  Na(122)/K(4.3)/Cl(88)/HCO3(23)/BUN(88)/Cr(2.74)Glu(167)/Ca(8.0)/Mg(2.50)/PO4(3.2)    09-14 @ 13:33  Na(121)/K(4.3)/Cl(88)/HCO3(24)/BUN(87)/Cr(2.68)Glu(235)/Ca(8.0)/Mg(2.50)/PO4(3.5)    09-14 @ 05:28    Urinalysis Basic - ( 16 Sep 2023 06:00 )    Color: x / Appearance: x / SG: x / pH: x  Gluc: 222 mg/dL / Ketone: x  / Bili: x / Urobili: x   Blood: x / Protein: x / Nitrite: x   Leuk Esterase: x / RBC: x / WBC x   Sq Epi: x / Non Sq Epi: x / Bacteria: x      Osmolality, Random Urine: 277 mosm/kg (09-14 @ 13:46)  Sodium, Random Urine: 40 mmol/L (09-14 @ 13:46)  Potassium, Random Urine: 17.3 mmol/L (09-14 @ 13:46)  Chloride, Random Urine: 26 mmol/L (09-14 @ 13:46)        ASSESSMENT/PLAN  IMPRESSION: 73F w/ HTN, DM2, CVA, breast CA-bilateral mastectomy, recurrent aspiration pneumonia/respiratory failure, and CKD, 8/11/23 p/w acute hypercapnic respiratory failure; c/b RUSS    (1)CKD - stage 4     (2)RUSS - ATN vs AIN -BUN and creatinine elevated w/ electrolyte derangements, (hyponatremia,  hyperphosphatemia). Improved s/p HD. Last HD 9/9/23    (3)Hyponatremia - Na+ improving     (4)Hypocalcemia corrects to ~9.1 for hypalbuminemia     (5)Pulm- hypercapnic respiratory failure on admission - now s/p trach; remains on vent     (6)CV - normotensive     (7)ID - on IV Vanco.  BCx w/ NGTD    (8)Anemia - hgb trending down s/p PRBC 9/14    RECOMMEND:  (1)No HD today   (2)1.5% NS 50cc/hr x 5hours  (3)No objection to diuretics as needed   (4)Continue NaCl tabs 2gm bid via NGT  (5)Epogen as ordered   (6)Dose new meds for GFR <10/HD      Nas Corona NP-BC  [facility]  [phone]

## 2023-09-16 NOTE — PROGRESS NOTE ADULT - SUBJECTIVE AND OBJECTIVE BOX
CC: F/U for MSSA    Saw/spoke to patient. No fevers, no chills. No new complaints.    Allergies  isoniazid (Rash)  nafcillin (Unknown)  hydrALAZINE (Rash)  vitamin E (Short breath; Urticaria; Hives)  doxycycline (Rash)  cefepime (Rash)  NIFEdipine (Urticaria; Hives)    ANTIMICROBIALS:  vancomycin  IVPB 750 once    PE:    Vital Signs Last 24 Hrs  T(C): 36.7 (16 Sep 2023 10:20), Max: 37.8 (15 Sep 2023 15:01)  T(F): 98.1 (16 Sep 2023 10:20), Max: 100.1 (15 Sep 2023 15:01)  HR: 93 (16 Sep 2023 10:20) (93 - 107)  BP: 99/50 (16 Sep 2023 10:20) (99/50 - 126/46)  RR: 20 (16 Sep 2023 10:20) (20 - 20)  SpO2: 99% (16 Sep 2023 10:20) (92% - 103%)    Gen: AOx1-2  CV: Nontachycardic  Resp: Breathing comfortably, trach  Abd: Soft, nontender  IV/Skin: No thrombophlebitis    LABS:                        7.3    5.02  )-----------( 164      ( 16 Sep 2023 06:00 )             22.5     09-16    123<L>  |  89<L>  |  92<H>  ----------------------------<  222<H>  4.1   |  22  |  2.63<H>    Ca    8.1<L>      16 Sep 2023 06:00  Phos  3.2     09-16  Mg     2.40     09-16    Urinalysis Basic - ( 16 Sep 2023 06:00 )    Color: x / Appearance: x / SG: x / pH: x  Gluc: 222 mg/dL / Ketone: x  / Bili: x / Urobili: x   Blood: x / Protein: x / Nitrite: x   Leuk Esterase: x / RBC: x / WBC x   Sq Epi: x / Non Sq Epi: x / Bacteria: x    MICROBIOLOGY:  Vancomycin Level, Random: 15.1 ug/mL (09-16-23 @ 06:00)    Ear Ear  09-13-23   Moderate Candida albicans "Susceptibilities not performed"  --  --    .Blood Blood-Venous  09-10-23   No growth at 5 days  --  --    .Sputum Sputum  09-10-23   Normal Respiratory Cate present  --    Rare polymorphonuclear leukocytes per low power field  No Squamous epithelial cells per low power field  Rare Yeast like cells seen per oil power field  Rare Gram Positive Cocci in Clusters seen per oil power field    .Blood Blood-Peripheral  09-10-23   No growth at 5 days  --  --    .Sputum Sputum  09-08-23   Normal Respiratory Cate present  --    Numerous polymorphonuclear leukocytes per low power field  Numerous Squamous epithelial cells per low power field  Few Yeast like cells per oil power field  Results consistent with oropharyngeal contamination    .Blood Blood-Peripheral  09-08-23   No growth at 5 days  --  --    Ear Ear  09-06-23   No growth at 5 days  --  --    .Blood Blood-Peripheral  09-04-23   No growth at 5 days  --  --    .Bronchial Bronchial Lavage  09-01-23   Numerous Staphylococcus aureus Multiple Morphological Strains  Normal Respiratory Cate present  --  Staphylococcus aureus  Staphylococcus aureus    .Blood Blood-Peripheral  09-01-23   Growth in aerobic and anaerobic bottles: Staphylococcus aureus  Direct identification is available within approximately 3-5  hours either by Blood Panel Multiplexed PCR or Direct  MALDI-TOF. Details: [email or web address]  Growth in anaerobic bottle: blood culture bottle turned positive after  the final report was released.  --  Blood Culture PCR  Staphylococcus aureus    ET Tube ET Tube  09-01-23   Moderate Staphylococcus aureus  Moderate Escherichia coli ESBL  Normal Respiratory Cate present  --  Staphylococcus aureus  Escherichia coli ESBL    RADIOLOGY:    9/12 CT:    IMPRESSION:    1. Acute on chronic left-sided otitis externa and acute on chronic   left-sided otomastoiditis. 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 Superimposed cholesteatoma formation within the left   tympanomastoid cavity cannot be excluded. No evidence of malignant otitis   externa.    2. Chronic right-sided mastoiditis.

## 2023-09-16 NOTE — PROGRESS NOTE ADULT - SUBJECTIVE AND OBJECTIVE BOX
Subjective: Patient seen and examined. No new events except as noted.   Daughter at bedside.     REVIEW OF SYSTEMS:  Unable to obtain.    MEDICATIONS:  MEDICATIONS  (STANDING):  albuterol/ipratropium for Nebulization 3 milliLiter(s) Nebulizer every 6 hours  atorvastatin 40 milliGRAM(s) Oral at bedtime  chlorhexidine 0.12% Liquid 15 milliLiter(s) Oral Mucosa every 12 hours  chlorhexidine 2% Cloths 1 Application(s) Topical daily  ciprofloxacin/hydrocortisone Suspension Otic 4 Drop(s) Left Ear two times a day  dextrose 5%. 1000 milliLiter(s) (50 mL/Hr) IV Continuous <Continuous>  dextrose 5%. 1000 milliLiter(s) (100 mL/Hr) IV Continuous <Continuous>  dextrose 50% Injectable 12.5 Gram(s) IV Push once  dextrose 50% Injectable 25 Gram(s) IV Push once  dextrose 50% Injectable 25 Gram(s) IV Push once  doxazosin 6 milliGRAM(s) Oral <User Schedule>  epoetin odalis (EPOGEN) Injectable 35825 Unit(s) SubCutaneous <User Schedule>  ferrous    sulfate Liquid 300 milliGRAM(s) Enteral Tube daily  FIRST- Mouthwash  BLM 10 milliLiter(s) Swish and Spit four times a day  glucagon  Injectable 1 milliGRAM(s) IntraMuscular once  heparin  Infusion 1000 Unit(s)/Hr (9.5 mL/Hr) IV Continuous <Continuous>  insulin lispro (ADMELOG) corrective regimen sliding scale   SubCutaneous every 6 hours  insulin NPH human recombinant 3 Unit(s) SubCutaneous every 6 hours  meropenem  IVPB 500 milliGRAM(s) IV Intermittent every 12 hours  mupirocin 2% Ointment 1 Application(s) Topical two times a day  mupirocin 2% Ointment 1 Application(s) Both Nostrils two times a day  pantoprazole  Injectable 40 milliGRAM(s) IV Push two times a day  psyllium Powder 1 Packet(s) Oral daily  sevelamer carbonate Powder 800 milliGRAM(s) Oral three times a day  sodium chloride 2 Gram(s) Oral two times a day  sodium chloride 1.5%. 500 milliLiter(s) (50 mL/Hr) IV Continuous <Continuous>  sodium chloride 3%  Inhalation 4 milliLiter(s) Inhalation every 6 hours    PHYSICAL EXAM:  Vital Signs Last 24 Hrs  T(C): 37.3 (16 Sep 2023 05:35), Max: 37.8 (15 Sep 2023 15:01)  T(F): 99.1 (16 Sep 2023 05:35), Max: 100.1 (15 Sep 2023 15:01)  HR: 94 (16 Sep 2023 05:35) (94 - 107)  BP: 116/45 (16 Sep 2023 05:35) (110/35 - 126/46)  BP(mean): --  RR: 20 (16 Sep 2023 05:35) (20 - 20)  SpO2: 99% (16 Sep 2023 05:35) (92% - 103%)    Parameters below as of 16 Sep 2023 05:35  Patient On (Oxygen Delivery Method): ventilator    O2 Concentration (%): 30    I&O's Summary    14 Sep 2023 07:01  -  15 Sep 2023 07:00  --------------------------------------------------------  IN: 889.5 mL / OUT: 190 mL / NET: 699.5 mL    15 Sep 2023 07:01  -  16 Sep 2023 06:04  --------------------------------------------------------  IN: 954 mL / OUT: 1100 mL / NET: -146 mL    Appearance: NAD, +trach  HEENT: dry oral mucosa  Lymphatic: No lymphadenopathy  Cardiovascular: Normal S1 S2, No JVD, No murmurs, No edema  Respiratory: Decreased BS, + trach to vent	  Neuro: opens eyes  Gastrointestinal: Soft, Non-tender, + BS, + NGT  Skin: No rashes, No ecchymoses, No cyanosis	  Extremities: No strength/ROM 2/2 sedation, + BL LE edema  Vascular: Peripheral pulses palpable 2+ bilaterally    LABS:    CARDIAC MARKERS:                        7.5    6.16  )-----------( 169      ( 15 Sep 2023 01:37 )             22.9     09-15    123<L>  |  88<L>  |  90<H>  ----------------------------<  211<H>  4.4   |  24  |  2.59<H>    Ca    8.0<L>      15 Sep 2023 16:25  Phos  3.2     09-15  Mg     2.50     09-15    proBNP:   Lipid Profile:   HgA1c:   TSH:     TELEMETRY: 	    ECG:  	  RADIOLOGY:   DIAGNOSTIC TESTING:  [ ] Echocardiogram:  [ ]  Catheterization:  [ ] Stress Test:    OTHER:

## 2023-09-16 NOTE — PROGRESS NOTE ADULT - ASSESSMENT
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 While in MICU patient noted with worsening renal failure requiring HD initiation, CGE/ Melena s/p EGD 8/31 found with esophagitis and erosive gastropathy, new right cerebellar infarct and fevers second to ESBL COLI and MSSA VAP, MSSA bacteremia, left ear otitis externa and drug rxn to cephalosporins Course further complicated by prolonged vent time s/p tracheostomy 9/1 and transferred to RCU 9/3 completed by recurrent fevers with high PIP, respiratory acidosis and concern for volume overloaded.     NEUROLOGY  # Metabolic Encephalopath vs NEW cerebella infarct   - Baseline MS AOX3 with short term memory changes per family however noted with concern for poor mentation in ICU  - MRI (8/17) with NEW right cerebellar infarct and old left PCA/occipital infarcts  - STEPHANIE (8/17) with AV showing two linear mobile echogenic elements attached to valve leaflets consistent with Lambel's excrescence, but no TRINITY thrombus, and lambel's excrescence with uncertain clinical implication.   - EEG (8/11-8/15) with no seizures   - ILR interrogation (9/7) with SR and PACs falsely recorded as AF.   - Attempted to resume ASA for medical management but held given GIB   - Continue on Lipitor for medical management   - Course complicated by questionable head and body shaking 9/8 however prolactin elevated and shakes head yes/no to some questions.   - Lethargy more likely second to respiratory acidosis and will hold on RPT EEG    - Monitor mentation closely Awakens to verbal stimuli     CARDIOVASCULAR  # HTN Urgency   # Septic vs vasoplegic shock   - Labile BPs ranging in between SBP 80s-200s and attempted labetalol, however noted with hypotension and bradycardia likely from BB toxicity vs shock state?    - Holding Labetalol and Catapres   - c/w Cardura for now     # Hx of HFpEF with likely ADHF with volume overload.   - ECHO (7/2023) with EF 59 with severe LVH and diastolic dysfunction   - STEPHANIE (8/18) with normal LVRVSF however noted with increased LA pressures and persistent LA septal bowing into RA.   - ECHO (8/11) with EF 60 with mild LVH, normal LVRVSF, and mild ddfxn.  - Remove volume with HD sessions and intermittent bumex pushes as BP allows    - c/w Bumex 2mg IV BID for now - Monitor UOP and electrolytes     HEENT   # Left ear OE   - ENT evaluation (9/7) with no mastoid tenderness, however found with positive swelling and excoriation to left auricle and tenderness to manipulation of auricle. Debrided crusting of auricle and EAC evaluated with edema and unable to visualize TM. EAC debrided and found with watery exudate.   - Continue on CiproHC (9/5 - )   - Continue on Bradford (9/1 - ) as below   - ENT following and ear wick change QD   - Wick out but needs ? debridement wound care called/fu ent   - ENT recommending CT IAC w/ IV con but family is refusing as concerned given CKD, kidneys wouldn't recover from IV contrast. Spoke with Nephrology, ENT, and patient's  at length. Planned for CT non con and per , decision will be made from there but for now doesn't want to risk further harming kidneys.  - CT IAC showing acute on chronic left-sided otitis externa and acute on chronic left-sided otomastoiditis. Superimposed left-sided tympanosclerosis. Superimposed cholesteatoma formation within the left   tympanomastoid cavity cannot be excluded. No evidence of malignant otitis externa.    # Oral Lesions with questionable Zoster vs Trauma?   - Patient with tongue pain and seen with anterior ulcerations consolidating and crusting and posterior ulceration.  - Case discussed with pathology resident and culture/ cytology sent.   - HSV negative and Path (9/6) with normal appearing epithelial cells singly and in clusters devoid of viral cytopathic effect.   - Continue on magic mouth wash for pain    RESPIRATORY  # AHHRF second to volume overload   - Hx of CKD and HFpEF presented with SOB and hypercarbia second to volume overload requiring intubation and HD initiation   - Vent weaning attempted however limited requiring tracheostomy (9/1) with IP   - Course complicated by PIPs elevated (50s) and respiratory acidosis second to secretions vs volume ?    - Vent adjusted 20/300/5/30 without improvement.   - POCUS (9/8) with BL pleural effusions (large on right and small on left)   - CT CHEST (9/8) with TBM, BL pleural effusions and continued consolidations   - Continue on vent and given TBM increased PEEP (9/9) to 20/300/8/40 with overall improvement   - Continue on duonebs and HTS for airway clearance   - Continue volume removal with diuresis and aggressive UF sessions.   - Discussing possible thora but appears improved and will monitor.  ]  - Bedside US showing decrease in pleural effusion - hold off thoracentesis 9/10     GI  # UGIB   - CGE x 1 episode on 8/24 and melena x 2 episodes on 8/31 likely residual blood.   - EGD (8/31) found with esophagitis and erosive gastropathy  - Continue on PPI BID through late October and then PPI QD   - No melena     # Dysphagia   - NGT-TF continued   - Pending PEG however needs improvement in infectious status prior to placement.     RENAL  # Progressive CKD   - Presented volume overload and progressive RUSS on CKD (baseline CRE in 2s and mode into the 3s on admission) likely obstruction vs low flow state with shock vs AIN from drug reaction issue?  - POCUS with no signs of hydronephrosis   - Pressor support started with improvement in renal function  - Attempted steroid course in ICU without improvement in renal function   - Case discussed at length with renal and initiated on HD in ICU and now continued on HD TTHS, however remains volume overloaded.   - Patient oliguric however responds to Bumex pushes   - Continue on aggressive UF sessions with HD TTHS and bumex pushes as BP allows   - Monitor renal function and UOP     Hyponatremia  - c/w Salt tabs - s/p Hypertonic 1.5%NS @ 50cc/hr x 5 hr  - Renal- holding off on HD for now      # Hyperphosphatemia (resolved)   - PTH normal and elevated phos likely from renal failure  - s/p Phos binder with Renvela - now d'beth as level wnl      INFECTIOUS DISEASE  # ESBL ECOLI and MSSA VAP c/b MSSA bacteremia   - RVP/ COVID (8/11) negative   - SCx (8/11) with MSSA s/p cefepime (8/12-8/13) and then ancef (8/14) however noted with drug reaction and then changed to vanco and aztreonam (8/12-8/13) in ICU .   - Course complicated by recurrent fevers and return of MSSA with bacteremia.   - SCx (9/1) with MSSA and ESBL ECOLI   - BAL (9/1) with MSSA   - BCx (9/1) with MSSA and cleared on (9/4)   - ECHO (9/6) unable to rule out endocarditis   - Continue on Bradford (9/4 - ) and Vanco BY DOSE POST HD (9/4, 9/7, 9/9 ...) with duration TBD at this time.   - Given inability to rule out endocarditis will discuss possible 4 wks?   - Course complicated by fever (9/7) and UA with leuks but no bacteria, however compared to prior improved, RVP/COVID and SCx negative, and RPT CXR similar to prior   - Pending BCx, SCx, UCx, and LE DOPPLERS- Blood and sputum cx NTD; Acute left deep vein thrombosis of the left popliteal vein.  - Febrile overnight 102 recultured and sent off   -Pt receiving vanco post HD however today given vanco 750mg x 1 will check Vanco level at end of HD session and dose   -     # Left ear OE   - ENT evaluation (9/7) with no mastoid tenderness, however found with positive swelling and excoriation to left auricle and tenderness to manipulation of auricle. Debrided crusting of auricle and EAC evaluated with edema and unable to visualize TM. EAC debrided and found with watery exudate.   - Continue on CiproHC (9/5 - )   - Continue on Bradford (9/1 - ) as below     # MRSA PCRs   - MRSA PCR (8/11 and 8/14) negative   - Next MRSA + PCR ordered for 10/8     HEME  # Anemia second to GIB vs renal disease   - Hold chemical DVT PPX given bleeding/ waxing and waning HH   - s/p multiple units of PRBCs (8/15, 8/21, 8/28, 8/31 and 9/8)   - Anemia panel with AOCD but with low %sat and ferrous sulfate added to optimize   - Monitor HH and discuss with renal for EPO sometime next week.   - SPoke with GI for clearance to start Heparin gtt for + DVT     Vascular  # DVT  - Pt with + popliteal DVT on SCD Spoke with GI no absolute contraindication for starting Heparin - advise close monitoring for bleeding - Do stat CTA if bleeding occurs and call IR   - Pt specific heparin gtt started with goal PTT 60-85  - will monitor closely     ONC   # Hx of Breast CA   - Patient dx in 2018 and s/p BL mastectomy (radical on right) and chemo and RT.   - Supportive care.     ENDOCRINE  # IDDM2   - Continue on NPH 3U and ISS   - Monitor FS and adjust as needed     LINES/ TUBES  - R ARTHUR TAYLOR (8/19 - )     SKIN  # Drug Eruption   - Attempted cefepime (8/12) vs ancef (8/13-8/14) and then noted with drug rxn.   - Hold further cephalosporins at this time   - s/p Steroids with prednisone 40 (8/18 - 9/2) then prednisone 30 (9/3-9/7), then prednisone 20 (9/8 - 9/10), then prednisone 10mg (9/11-9/13) then turn off.     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 While in MICU patient noted with worsening renal failure requiring HD initiation, CGE/ Melena s/p EGD 8/31 found with esophagitis and erosive gastropathy, new right cerebellar infarct and fevers second to ESBL COLI and MSSA VAP, MSSA bacteremia, left ear otitis externa and drug rxn to cephalosporins Course further complicated by prolonged vent time s/p tracheostomy 9/1 and transferred to RCU 9/3 completed by recurrent fevers with high PIP, respiratory acidosis and concern for volume overloaded.     NEUROLOGY  # Metabolic Encephalopath vs NEW cerebella infarct   - Baseline MS AOX3 with short term memory changes per family however noted with concern for poor mentation in ICU  - MRI (8/17) with NEW right cerebellar infarct and old left PCA/occipital infarcts  - STEPHANIE (8/17) with AV showing two linear mobile echogenic elements attached to valve leaflets consistent with Lambel's excrescence, but no TRINITY thrombus, and lambel's excrescence with uncertain clinical implication.   - EEG (8/11-8/15) with no seizures   - ILR interrogation (9/7) with SR and PACs falsely recorded as AF.   - Attempted to resume ASA for medical management but held given GIB   - Continue on Lipitor for medical management   - Course complicated by questionable head and body shaking 9/8 however prolactin elevated and shakes head yes/no to some questions.   - Lethargy more likely second to respiratory acidosis and will hold on RPT EEG    - Monitor mentation closely Awakens to verbal stimuli     CARDIOVASCULAR  # HTN Urgency   # Septic vs vasoplegic shock   - Labile BPs ranging in between SBP 80s-200s and attempted labetalol, however noted with hypotension and bradycardia likely from BB toxicity vs shock state?    - Holding Labetalol and Catapres   - c/w Cardura for now     # Hx of HFpEF with likely ADHF with volume overload.   - ECHO (7/2023) with EF 59 with severe LVH and diastolic dysfunction   - STEPHANIE (8/18) with normal LVRVSF however noted with increased LA pressures and persistent LA septal bowing into RA.   - ECHO (8/11) with EF 60 with mild LVH, normal LVRVSF, and mild ddfxn.  - Remove volume with HD sessions and intermittent bumex pushes as BP allows    - c/w Bumex 2mg IV BID for now - Monitor UOP and electrolytes     HEENT   # Left ear OE   - ENT evaluation (9/7) with no mastoid tenderness, however found with positive swelling and excoriation to left auricle and tenderness to manipulation of auricle. Debrided crusting of auricle and EAC evaluated with edema and unable to visualize TM. EAC debrided and found with watery exudate.   - Continue on CiproHC (9/5 - )   - Continue on Bradford (9/1 - ) as below   - ENT following and ear wick change QD   - Wick out but needs ? debridement wound care called/fu ent   - ENT recommending CT IAC w/ IV con but family is refusing as concerned given CKD, kidneys wouldn't recover from IV contrast. Spoke with Nephrology, ENT, and patient's  at length. Planned for CT non con and per , decision will be made from there but for now doesn't want to risk further harming kidneys.  - CT IAC showing acute on chronic left-sided otitis externa and acute on chronic left-sided otomastoiditis. Superimposed left-sided tympanosclerosis. Superimposed cholesteatoma formation within the left   tympanomastoid cavity cannot be excluded. No evidence of malignant otitis externa.    # Oral Lesions with questionable Zoster vs Trauma?   - Patient with tongue pain and seen with anterior ulcerations consolidating and crusting and posterior ulceration.  - Case discussed with pathology resident and culture/ cytology sent.   - HSV negative and Path (9/6) with normal appearing epithelial cells singly and in clusters devoid of viral cytopathic effect.   - Continue on magic mouth wash for pain    RESPIRATORY  # AHHRF second to volume overload   - Hx of CKD and HFpEF presented with SOB and hypercarbia second to volume overload requiring intubation and HD initiation   - Vent weaning attempted however limited requiring tracheostomy (9/1) with IP   - Course complicated by PIPs elevated (50s) and respiratory acidosis second to secretions vs volume ?    - Vent adjusted 20/300/5/30 without improvement.   - POCUS (9/8) with BL pleural effusions (large on right and small on left)   - CT CHEST (9/8) with TBM, BL pleural effusions and continued consolidations   - Continue on vent and given TBM increased PEEP (9/9) to 20/300/8/40 with overall improvement   - Continue on duonebs and HTS for airway clearance   - Continue volume removal with diuresis and aggressive UF sessions.   - Discussing possible thora but appears improved and will monitor.  ]  - Bedside US showing decrease in pleural effusion - hold off thoracentesis 9/10     GI  # UGIB   - CGE x 1 episode on 8/24 and melena x 2 episodes on 8/31 likely residual blood.   - EGD (8/31) found with esophagitis and erosive gastropathy  - Continue on PPI BID through late October and then PPI QD   - No melena     # Dysphagia   - NGT-TF continued   - Pending PEG however needs improvement in infectious status prior to placement.     RENAL  # Progressive CKD   - Presented volume overload and progressive RUSS on CKD (baseline CRE in 2s and mode into the 3s on admission) likely obstruction vs low flow state with shock vs AIN from drug reaction issue?  - POCUS with no signs of hydronephrosis   - Pressor support started with improvement in renal function  - Attempted steroid course in ICU without improvement in renal function   - Case discussed at length with renal and initiated on HD in ICU and now continued on HD TTHS, however remains volume overloaded.   - Patient oliguric however responds to Bumex pushes   - Continue on aggressive UF sessions with HD TTHS and bumex pushes as BP allows   - Monitor renal function and UOP     Hyponatremia  - c/w Salt tabs - s/p Hypertonic 1.5%NS @ 50cc/hr x 5 hr  - Renal- holding off on HD for now      # Hyperphosphatemia (resolved)   - PTH normal and elevated phos likely from renal failure  - s/p Phos binder with Renvela - now d'beth as level wnl      INFECTIOUS DISEASE  # ESBL ECOLI and MSSA VAP c/b MSSA bacteremia   - RVP/ COVID (8/11) negative   - SCx (8/11) with MSSA s/p cefepime (8/12-8/13) and then ancef (8/14) however noted with drug reaction and then changed to vanco and aztreonam (8/12-8/13) in ICU .   - Course complicated by recurrent fevers and return of MSSA with bacteremia.   - SCx (9/1) with MSSA and ESBL ECOLI   - BAL (9/1) with MSSA   - BCx (9/1) with MSSA and cleared on (9/4)   - ECHO (9/6) unable to rule out endocarditis   - Continue on Bradford (9/4 - ) and Vanco BY DOSE POST HD (9/4, 9/7, 9/9 ...) with duration TBD at this time.   - Given inability to rule out endocarditis will discuss possible 4 wks?   - Course complicated by fever (9/7) and UA with leuks but no bacteria, however compared to prior improved, RVP/COVID and SCx negative, and RPT CXR similar to prior   - Pending BCx, SCx, UCx, and LE DOPPLERS- Blood and sputum cx NTD; Acute left deep vein thrombosis of the left popliteal vein.  - Febrile overnight 102 recultured and sent off   -Pt receiving vanco post HD however today given vanco 750mg x 1 will check Vanco level at end of HD session and dose   -     # Left ear OE   - ENT evaluation (9/7) with no mastoid tenderness, however found with positive swelling and excoriation to left auricle and tenderness to manipulation of auricle. Debrided crusting of auricle and EAC evaluated with edema and unable to visualize TM. EAC debrided and found with watery exudate.   - Continue on CiproHC (9/5 - )   - Continue on Bradford (9/1 - ) as below     # MRSA PCRs   - MRSA PCR (8/11 and 8/14) negative   - Next MRSA + PCR ordered for 10/8     HEME  # Anemia second to GIB vs renal disease   - Hold chemical DVT PPX given bleeding/ waxing and waning HH   - s/p multiple units of PRBCs (8/15, 8/21, 8/28, 8/31 and 9/8)   - Anemia panel with AOCD but with low %sat and ferrous sulfate added to optimize   - Monitor HH and discuss with renal for EPO sometime next week.   - SPoke with GI for clearance to start Heparin gtt for + DVT     Vascular  # DVT  - Pt with + popliteal DVT on SCD Spoke with GI no absolute contraindication for starting Heparin - advise close monitoring for bleeding - Do stat CTA if bleeding occurs and call IR   - Pt specific heparin gtt started with goal PTT 60-85  - will monitor closely     ONC   # Hx of Breast CA   - Patient dx in 2018 and s/p BL mastectomy (radical on right) and chemo and RT.   - Supportive care.     ENDOCRINE  # IDDM2   - Continue on NPH 3U and ISS   - Monitor FS and adjust as needed     LINES/ TUBES  - R ARTHUR TAYLOR (8/19 - )     SKIN  # Drug Eruption   - Attempted cefepime (8/12) vs ancef (8/13-8/14) and then noted with drug rxn.   - Hold further cephalosporins at this time   - s/p Steroids with prednisone 40 (8/18 - 9/2) then prednisone 30 (9/3-9/7), then prednisone 20 (9/8 - 9/10), then prednisone 10mg (9/11-9/13) then turn off.     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 While in MICU patient noted with worsening renal failure requiring HD initiation, CGE/ Melena s/p EGD 8/31 found with esophagitis and erosive gastropathy, new right cerebellar infarct and fevers second to ESBL COLI and MSSA VAP, MSSA bacteremia, left ear otitis externa and drug rxn to cephalosporins Course further complicated by prolonged vent time s/p tracheostomy 9/1 and transferred to RCU 9/3 completed by recurrent fevers with high PIP, respiratory acidosis and concern for volume overloaded.     NEUROLOGY  # Metabolic Encephalopath vs NEW cerebella infarct   - Baseline MS AOX3 with short term memory changes per family however noted with concern for poor mentation in ICU  - MRI (8/17) with NEW right cerebellar infarct and old left PCA/occipital infarcts  - STEPHANIE (8/17) with AV showing two linear mobile echogenic elements attached to valve leaflets consistent with Lambel's excrescence, but no TRINITY thrombus, and lambel's excrescence with uncertain clinical implication.   - EEG (8/11-8/15) with no seizures   - ILR interrogation (9/7) with SR and PACs falsely recorded as AF.   - Attempted to resume ASA for medical management but held given GIB   - Continue on Lipitor for medical management   - Course complicated by questionable head and body shaking 9/8 however prolactin elevated and shakes head yes/no to some questions.   - Lethargy more likely second to respiratory acidosis and will hold on RPT EEG    - Monitor mentation closely Awakens to verbal stimuli     CARDIOVASCULAR  # HTN Urgency   # Septic vs vasoplegic shock   - Labile BPs ranging in between SBP 80s-200s and attempted labetalol, however noted with hypotension and bradycardia likely from BB toxicity vs shock state?    - Holding Labetalol and Catapres   - c/w Cardura for now     # Hx of HFpEF with likely ADHF with volume overload.   - ECHO (7/2023) with EF 59 with severe LVH and diastolic dysfunction   - STEPHANIE (8/18) with normal LVRVSF however noted with increased LA pressures and persistent LA septal bowing into RA.   - ECHO (8/11) with EF 60 with mild LVH, normal LVRVSF, and mild ddfxn.  - Remove volume with HD sessions and intermittent bumex pushes as BP allows    - c/w Bumex 2mg IV BID for now - Monitor UOP and electrolytes     HEENT   # Left ear OE   - ENT evaluation (9/7) with no mastoid tenderness, however found with positive swelling and excoriation to left auricle and tenderness to manipulation of auricle. Debrided crusting of auricle and EAC evaluated with edema and unable to visualize TM. EAC debrided and found with watery exudate.   - Continue on CiproHC (9/5 - 9/16)   - Continue on Bradford (9/1 - ) as below   - ENT following and ear wick change QD   - Wick out but needs ? debridement wound care called/fu ent   - ENT recommending CT IAC w/ IV con but family is refusing as concerned given CKD, kidneys wouldn't recover from IV contrast. Spoke with Nephrology, ENT, and patient's  at length. Planned for CT non con and per , decision will be made from there but for now doesn't want to risk further harming kidneys.  - CT IAC showing acute on chronic left-sided otitis externa and acute on chronic left-sided otomastoiditis. Superimposed left-sided tympanosclerosis. Superimposed cholesteatoma formation within the left   tympanomastoid cavity cannot be excluded. No evidence of malignant otitis externa.    # Oral Lesions with questionable Zoster vs Trauma?   - Patient with tongue pain and seen with anterior ulcerations consolidating and crusting and posterior ulceration.  - Case discussed with pathology resident and culture/ cytology sent.   - HSV negative and Path (9/6) with normal appearing epithelial cells singly and in clusters devoid of viral cytopathic effect.   - Continue on magic mouth wash for pain    RESPIRATORY  # AHHRF second to volume overload   - Hx of CKD and HFpEF presented with SOB and hypercarbia second to volume overload requiring intubation and HD initiation   - Vent weaning attempted however limited requiring tracheostomy (9/1) with IP   - Course complicated by PIPs elevated (50s) and respiratory acidosis second to secretions vs volume ?    - Vent adjusted 20/300/5/30 without improvement.   - POCUS (9/8) with BL pleural effusions (large on right and small on left)   - CT CHEST (9/8) with TBM, BL pleural effusions and continued consolidations   - Continue on vent and given TBM increased PEEP (9/9) to 20/300/8/40 with overall improvement   - Continue on duonebs and HTS for airway clearance   - Continue volume removal with diuresis and aggressive UF sessions.   - Discussing possible thora but appears improved and will monitor.  ]  - Bedside US showing decrease in pleural effusion - hold off thoracentesis 9/10     GI  # UGIB   - CGE x 1 episode on 8/24 and melena x 2 episodes on 8/31 likely residual blood.   - EGD (8/31) found with esophagitis and erosive gastropathy  - Continue on PPI BID through late October and then PPI QD   - No melena     # Dysphagia   - NGT-TF continued   - Pending PEG however needs improvement in infectious status prior to placement.     RENAL  # Progressive CKD   - Presented volume overload and progressive RUSS on CKD (baseline CRE in 2s and mode into the 3s on admission) likely obstruction vs low flow state with shock vs AIN from drug reaction issue?  - POCUS with no signs of hydronephrosis   - Pressor support started with improvement in renal function  - Attempted steroid course in ICU without improvement in renal function   - Case discussed at length with renal and initiated on HD in ICU and now continued on HD TTHS, however remains volume overloaded.   - Patient oliguric however responds to Bumex pushes   - Continue on aggressive UF sessions with HD TTHS and bumex pushes as BP allows   - Monitor renal function and UOP     Hyponatremia  - c/w Salt tabs - s/p Hypertonic 1.5%NS @ 50cc/hr x 5 hr  - Renal- holding off on HD for now      # Hyperphosphatemia (resolved)   - PTH normal and elevated phos likely from renal failure  - s/p Phos binder with Renvela - now d'beth as level wnl      INFECTIOUS DISEASE  # ESBL ECOLI and MSSA VAP c/b MSSA bacteremia   - RVP/ COVID (8/11) negative   - SCx (8/11) with MSSA s/p cefepime (8/12-8/13) and then ancef (8/14) however noted with drug reaction and then changed to vanco and aztreonam (8/12-8/13) in ICU .   - Course complicated by recurrent fevers and return of MSSA with bacteremia.   - SCx (9/1) with MSSA and ESBL ECOLI   - BAL (9/1) with MSSA   - BCx (9/1) with MSSA and cleared on (9/4)   - ECHO (9/6) unable to rule out endocarditis   - Continue on Bradford (9/4 - ) and Vanco BY DOSE POST HD (9/4, 9/7, 9/9 ...) with duration TBD at this time.   - Given inability to rule out endocarditis will discuss possible 4 wks?   - Course complicated by fever (9/7) and UA with leuks but no bacteria, however compared to prior improved, RVP/COVID and SCx negative, and RPT CXR similar to prior   - Pending BCx, SCx, UCx, and LE DOPPLERS- Blood and sputum cx NTD; Acute left deep vein thrombosis of the left popliteal vein.  - Febrile overnight 102 recultured and sent off   -Pt receiving vanco post HD however today given vanco 750mg x 1 will check Vanco level at end of HD session and dose   -     # Left ear OE   - ENT evaluation (9/7) with no mastoid tenderness, however found with positive swelling and excoriation to left auricle and tenderness to manipulation of auricle. Debrided crusting of auricle and EAC evaluated with edema and unable to visualize TM. EAC debrided and found with watery exudate.   - Continue on CiproHC (9/5 - )   - Continue on Bradford (9/1 - ) as below     # MRSA PCRs   - MRSA PCR (8/11 and 8/14) negative   - Next MRSA + PCR ordered for 10/8     HEME  # Anemia second to GIB vs renal disease   - Hold chemical DVT PPX given bleeding/ waxing and waning HH   - s/p multiple units of PRBCs (8/15, 8/21, 8/28, 8/31 and 9/8)   - Anemia panel with AOCD but with low %sat and ferrous sulfate added to optimize   - Monitor HH and discuss with renal for EPO sometime next week.   - SPoke with GI for clearance to start Heparin gtt for + DVT     Vascular  # DVT  - Pt with + popliteal DVT on SCD Spoke with GI no absolute contraindication for starting Heparin - advise close monitoring for bleeding - Do stat CTA if bleeding occurs and call IR   - Pt specific heparin gtt started with goal PTT 60-85  - will monitor closely     ONC   # Hx of Breast CA   - Patient dx in 2018 and s/p BL mastectomy (radical on right) and chemo and RT.   - Supportive care.     ENDOCRINE  # IDDM2   - Continue on NPH 3U and ISS   - Monitor FS and adjust as needed     LINES/ TUBES  - R ARTHUR TAYLOR (8/19 - )     SKIN  # Drug Eruption   - Attempted cefepime (8/12) vs ancef (8/13-8/14) and then noted with drug rxn.   - Hold further cephalosporins at this time   - s/p Steroids with prednisone 40 (8/18 - 9/2) then prednisone 30 (9/3-9/7), then prednisone 20 (9/8 - 9/10), then prednisone 10mg (9/11-9/13) then turn off.     ETHICS/ GOC    - FULL CODE     DISPO - TBD  *******************************  9/16-renal issues continue

## 2023-09-16 NOTE — PROGRESS NOTE ADULT - SUBJECTIVE AND OBJECTIVE BOX
Patient is a 73y old  Female who presents with a chief complaint of Respiratory distress (16 Sep 2023 09:25)      SUBJECTIVE / OVERNIGHT EVENTS: no new fever overnight, on BUMEXm no HD necessary, on Bradford and Vanco IV, on full vent supprt via trache, on NGT feeds, -229,     MEDICATIONS  (STANDING):  albuterol/ipratropium for Nebulization 3 milliLiter(s) Nebulizer every 6 hours  atorvastatin 40 milliGRAM(s) Oral at bedtime  buMETAnide Injectable 2 milliGRAM(s) IV Push two times a day  chlorhexidine 0.12% Liquid 15 milliLiter(s) Oral Mucosa every 12 hours  chlorhexidine 2% Cloths 1 Application(s) Topical daily  dextrose 5%. 1000 milliLiter(s) (50 mL/Hr) IV Continuous <Continuous>  dextrose 5%. 1000 milliLiter(s) (100 mL/Hr) IV Continuous <Continuous>  dextrose 50% Injectable 12.5 Gram(s) IV Push once  dextrose 50% Injectable 25 Gram(s) IV Push once  dextrose 50% Injectable 25 Gram(s) IV Push once  doxazosin 6 milliGRAM(s) Oral <User Schedule>  epoetin odalis (EPOGEN) Injectable 09154 Unit(s) SubCutaneous <User Schedule>  ferrous    sulfate Liquid 300 milliGRAM(s) Enteral Tube daily  FIRST- Mouthwash  BLM 10 milliLiter(s) Swish and Spit four times a day  glucagon  Injectable 1 milliGRAM(s) IntraMuscular once  heparin  Infusion 1000 Unit(s)/Hr (9.5 mL/Hr) IV Continuous <Continuous>  insulin lispro (ADMELOG) corrective regimen sliding scale   SubCutaneous every 6 hours  insulin NPH human recombinant 3 Unit(s) SubCutaneous every 6 hours  meropenem  IVPB 500 milliGRAM(s) IV Intermittent every 12 hours  mupirocin 2% Ointment 1 Application(s) Both Nostrils two times a day  pantoprazole  Injectable 40 milliGRAM(s) IV Push two times a day  psyllium Powder 1 Packet(s) Oral daily  sodium chloride 2 Gram(s) Oral two times a day  sodium chloride 1.5%. 500 milliLiter(s) (50 mL/Hr) IV Continuous <Continuous>  sodium chloride 3%  Inhalation 4 milliLiter(s) Inhalation every 6 hours    MEDICATIONS  (PRN):  acetaminophen   Oral Liquid .. 650 milliGRAM(s) Oral every 6 hours PRN Temp greater or equal to 38C (100.4F), Mild Pain (1 - 3)  dextrose Oral Gel 15 Gram(s) Oral once PRN Blood Glucose LESS THAN 70 milliGRAM(s)/deciliter      Vital Signs Last 24 Hrs  T(F): 98.1 (09-16-23 @ 10:20), Max: 100.1 (09-15-23 @ 15:01)  HR: 97 (09-16-23 @ 11:25) (93 - 107)  BP: 99/50 (09-16-23 @ 10:20) (99/50 - 126/46)  RR: 20 (09-16-23 @ 10:20) (20 - 20)  SpO2: 96% (09-16-23 @ 11:25) (92% - 103%)  Telemetry:   CAPILLARY BLOOD GLUCOSE      POCT Blood Glucose.: 202 mg/dL (16 Sep 2023 11:31)  POCT Blood Glucose.: 221 mg/dL (16 Sep 2023 06:04)  POCT Blood Glucose.: 229 mg/dL (15 Sep 2023 23:19)  POCT Blood Glucose.: 227 mg/dL (15 Sep 2023 17:57)    I&O's Summary    15 Sep 2023 07:01  -  16 Sep 2023 07:00  --------------------------------------------------------  IN: 954 mL / OUT: 1100 mL / NET: -146 mL        PHYSICAL EXAM:  GENERAL: NAD, well-developed  HEAD:  Atraumatic, Normocephalic  EYES: EOMI, PERRLA, conjunctiva and sclera clear  NECK: Supple, No JVD  CHEST/LUNG: Clear to auscultation bilaterally; No wheeze  HEART: Regular rate and rhythm; No murmurs, rubs, or gallops  ABDOMEN: Soft, Nontender, Nondistended; Bowel sounds present  EXTREMITIES:  2+ Peripheral Pulses, No clubbing, cyanosis, or edema  PSYCH: AAOx3  NEUROLOGY: non-focal  SKIN: No rashes or lesions    LABS:                        7.3    5.02  )-----------( 164      ( 16 Sep 2023 06:00 )             22.5     09-16    123<L>  |  89<L>  |  92<H>  ----------------------------<  222<H>  4.1   |  22  |  2.63<H>    Ca    8.1<L>      16 Sep 2023 06:00  Phos  3.2     09-16  Mg     2.40     09-16      PTT - ( 15 Sep 2023 01:37 )  PTT:79.6 sec      Urinalysis Basic - ( 16 Sep 2023 06:00 )    Color: x / Appearance: x / SG: x / pH: x  Gluc: 222 mg/dL / Ketone: x  / Bili: x / Urobili: x   Blood: x / Protein: x / Nitrite: x   Leuk Esterase: x / RBC: x / WBC x   Sq Epi: x / Non Sq Epi: x / Bacteria: x        RADIOLOGY & ADDITIONAL TESTS:    Imaging Personally Reviewed:    Consultant(s) Notes Reviewed:      Care Discussed with Consultants/Other Providers:

## 2023-09-16 NOTE — PROGRESS NOTE ADULT - PROBLEM SELECTOR PLAN 1
Multifactorial     - Aspiration, acute on chronic diastolic CHF   IV abx - completed  s/p trach 9/1  ENT consulted for Left otitis externa and otomastoiditis    - c/w meds as ordered

## 2023-09-16 NOTE — PROGRESS NOTE ADULT - SUBJECTIVE AND OBJECTIVE BOX
CHIEF COMPLAINT: Patient is a 73y old  Female who presents with a chief complaint of Respiratory distress (16 Sep 2023 06:03)      Interval Events:    REVIEW OF SYSTEMS:  [ ] All other systems negative  [ ] Unable to assess ROS because ________    Mode: AC/ CMV (Assist Control/ Continuous Mandatory Ventilation), RR (machine): 20, TV (machine): 300, FiO2: 30, PEEP: 8, ITime: 0.53, MAP: 14, PIP: 39      OBJECTIVE:  ICU Vital Signs Last 24 Hrs  T(C): 37.3 (16 Sep 2023 05:35), Max: 37.8 (15 Sep 2023 15:01)  T(F): 99.1 (16 Sep 2023 05:35), Max: 100.1 (15 Sep 2023 15:01)  HR: 94 (16 Sep 2023 05:35) (94 - 107)  BP: 116/45 (16 Sep 2023 05:35) (110/35 - 126/46)  BP(mean): --  ABP: --  ABP(mean): --  RR: 20 (16 Sep 2023 05:35) (20 - 20)  SpO2: 99% (16 Sep 2023 05:35) (92% - 103%)    O2 Parameters below as of 16 Sep 2023 05:35  Patient On (Oxygen Delivery Method): ventilator    O2 Concentration (%): 30      Mode: AC/ CMV (Assist Control/ Continuous Mandatory Ventilation), RR (machine): 20, TV (machine): 300, FiO2: 30, PEEP: 8, ITime: 0.53, MAP: 14, PIP: 39    09-15 @ 07:01  -  09-16 @ 07:00  --------------------------------------------------------  IN: 954 mL / OUT: 1100 mL / NET: -146 mL      CAPILLARY BLOOD GLUCOSE      POCT Blood Glucose.: 221 mg/dL (16 Sep 2023 06:04)      HOSPITAL MEDICATIONS:  MEDICATIONS  (STANDING):  albuterol/ipratropium for Nebulization 3 milliLiter(s) Nebulizer every 6 hours  atorvastatin 40 milliGRAM(s) Oral at bedtime  buMETAnide Injectable 2 milliGRAM(s) IV Push two times a day  chlorhexidine 0.12% Liquid 15 milliLiter(s) Oral Mucosa every 12 hours  chlorhexidine 2% Cloths 1 Application(s) Topical daily  ciprofloxacin/hydrocortisone Suspension Otic 4 Drop(s) Left Ear two times a day  dextrose 5%. 1000 milliLiter(s) (50 mL/Hr) IV Continuous <Continuous>  dextrose 5%. 1000 milliLiter(s) (100 mL/Hr) IV Continuous <Continuous>  dextrose 50% Injectable 12.5 Gram(s) IV Push once  dextrose 50% Injectable 25 Gram(s) IV Push once  dextrose 50% Injectable 25 Gram(s) IV Push once  doxazosin 6 milliGRAM(s) Oral <User Schedule>  epoetin odalis (EPOGEN) Injectable 58145 Unit(s) SubCutaneous <User Schedule>  ferrous    sulfate Liquid 300 milliGRAM(s) Enteral Tube daily  FIRST- Mouthwash  BLM 10 milliLiter(s) Swish and Spit four times a day  glucagon  Injectable 1 milliGRAM(s) IntraMuscular once  heparin  Infusion 1000 Unit(s)/Hr (9.5 mL/Hr) IV Continuous <Continuous>  insulin lispro (ADMELOG) corrective regimen sliding scale   SubCutaneous every 6 hours  insulin NPH human recombinant 3 Unit(s) SubCutaneous every 6 hours  mupirocin 2% Ointment 1 Application(s) Both Nostrils two times a day  pantoprazole  Injectable 40 milliGRAM(s) IV Push two times a day  psyllium Powder 1 Packet(s) Oral daily  sodium chloride 2 Gram(s) Oral two times a day  sodium chloride 3%  Inhalation 4 milliLiter(s) Inhalation every 6 hours    MEDICATIONS  (PRN):  acetaminophen   Oral Liquid .. 650 milliGRAM(s) Oral every 6 hours PRN Temp greater or equal to 38C (100.4F), Mild Pain (1 - 3)  dextrose Oral Gel 15 Gram(s) Oral once PRN Blood Glucose LESS THAN 70 milliGRAM(s)/deciliter      LABS:                        7.5    6.16  )-----------( 169      ( 15 Sep 2023 01:37 )             22.9     09-15    123<L>  |  88<L>  |  90<H>  ----------------------------<  211<H>  4.4   |  24  |  2.59<H>    Ca    8.0<L>      15 Sep 2023 16:25  Phos  3.2     09-15  Mg     2.50     09-15      PTT - ( 15 Sep 2023 01:37 )  PTT:79.6 sec  Urinalysis Basic - ( 15 Sep 2023 16:25 )    Color: x / Appearance: x / SG: x / pH: x  Gluc: 211 mg/dL / Ketone: x  / Bili: x / Urobili: x   Blood: x / Protein: x / Nitrite: x   Leuk Esterase: x / RBC: x / WBC x   Sq Epi: x / Non Sq Epi: x / Bacteria: x            PAST MEDICAL & SURGICAL HISTORY:  Breast CA      Diabetes      Stroke      Cardiac arrest      HTN (hypertension)      H/O mastectomy, bilateral          FAMILY HISTORY:  No pertinent family history in first degree relatives        Social History:      RADIOLOGY:  [ ] Reviewed and interpreted by me    PULMONARY FUNCTION TESTS:    EKG: CHIEF COMPLAINT: Patient is a 73y old  Female who presents with a chief complaint of Respiratory distress.      Interval Events: No interval events noted overnight.      REVIEW OF SYSTEMS:  [x] Unable to assess ROS because vented.       Mode: AC/ CMV (Assist Control/ Continuous Mandatory Ventilation), RR (machine): 20, TV (machine): 300, FiO2: 30, PEEP: 8, ITime: 0.53, MAP: 14, PIP: 39      OBJECTIVE:  ICU Vital Signs Last 24 Hrs  T(C): 37.3 (16 Sep 2023 05:35), Max: 37.8 (15 Sep 2023 15:01)  T(F): 99.1 (16 Sep 2023 05:35), Max: 100.1 (15 Sep 2023 15:01)  HR: 94 (16 Sep 2023 05:35) (94 - 107)  BP: 116/45 (16 Sep 2023 05:35) (110/35 - 126/46)  BP(mean): --  ABP: --  ABP(mean): --  RR: 20 (16 Sep 2023 05:35) (20 - 20)  SpO2: 99% (16 Sep 2023 05:35) (92% - 103%)    O2 Parameters below as of 16 Sep 2023 05:35  Patient On (Oxygen Delivery Method): ventilator    O2 Concentration (%): 30      Mode: AC/ CMV (Assist Control/ Continuous Mandatory Ventilation), RR (machine): 20, TV (machine): 300, FiO2: 30, PEEP: 8, ITime: 0.53, MAP: 14, PIP: 39    09-15 @ 07:01  -  09-16 @ 07:00  --------------------------------------------------------  IN: 954 mL / OUT: 1100 mL / NET: -146 mL      CAPILLARY BLOOD GLUCOSE      POCT Blood Glucose.: 221 mg/dL (16 Sep 2023 06:04)      HOSPITAL MEDICATIONS:  MEDICATIONS  (STANDING):  albuterol/ipratropium for Nebulization 3 milliLiter(s) Nebulizer every 6 hours  atorvastatin 40 milliGRAM(s) Oral at bedtime  buMETAnide Injectable 2 milliGRAM(s) IV Push two times a day  chlorhexidine 0.12% Liquid 15 milliLiter(s) Oral Mucosa every 12 hours  chlorhexidine 2% Cloths 1 Application(s) Topical daily  ciprofloxacin/hydrocortisone Suspension Otic 4 Drop(s) Left Ear two times a day  dextrose 5%. 1000 milliLiter(s) (50 mL/Hr) IV Continuous <Continuous>  dextrose 5%. 1000 milliLiter(s) (100 mL/Hr) IV Continuous <Continuous>  dextrose 50% Injectable 12.5 Gram(s) IV Push once  dextrose 50% Injectable 25 Gram(s) IV Push once  dextrose 50% Injectable 25 Gram(s) IV Push once  doxazosin 6 milliGRAM(s) Oral <User Schedule>  epoetin odalis (EPOGEN) Injectable 79960 Unit(s) SubCutaneous <User Schedule>  ferrous    sulfate Liquid 300 milliGRAM(s) Enteral Tube daily  FIRST- Mouthwash  BLM 10 milliLiter(s) Swish and Spit four times a day  glucagon  Injectable 1 milliGRAM(s) IntraMuscular once  heparin  Infusion 1000 Unit(s)/Hr (9.5 mL/Hr) IV Continuous <Continuous>  insulin lispro (ADMELOG) corrective regimen sliding scale   SubCutaneous every 6 hours  insulin NPH human recombinant 3 Unit(s) SubCutaneous every 6 hours  mupirocin 2% Ointment 1 Application(s) Both Nostrils two times a day  pantoprazole  Injectable 40 milliGRAM(s) IV Push two times a day  psyllium Powder 1 Packet(s) Oral daily  sodium chloride 2 Gram(s) Oral two times a day  sodium chloride 3%  Inhalation 4 milliLiter(s) Inhalation every 6 hours    MEDICATIONS  (PRN):  acetaminophen   Oral Liquid .. 650 milliGRAM(s) Oral every 6 hours PRN Temp greater or equal to 38C (100.4F), Mild Pain (1 - 3)  dextrose Oral Gel 15 Gram(s) Oral once PRN Blood Glucose LESS THAN 70 milliGRAM(s)/deciliter      LABS:                        7.5    6.16  )-----------( 169      ( 15 Sep 2023 01:37 )             22.9     09-15    123<L>  |  88<L>  |  90<H>  ----------------------------<  211<H>  4.4   |  24  |  2.59<H>    Ca    8.0<L>      15 Sep 2023 16:25  Phos  3.2     09-15  Mg     2.50     09-15      PTT - ( 15 Sep 2023 01:37 )  PTT:79.6 sec  Urinalysis Basic - ( 15 Sep 2023 16:25 )    Color: x / Appearance: x / SG: x / pH: x  Gluc: 211 mg/dL / Ketone: x  / Bili: x / Urobili: x   Blood: x / Protein: x / Nitrite: x   Leuk Esterase: x / RBC: x / WBC x   Sq Epi: x / Non Sq Epi: x / Bacteria: x            PAST MEDICAL & SURGICAL HISTORY:  Breast CA      Diabetes      Stroke      Cardiac arrest      HTN (hypertension)      H/O mastectomy, bilateral          FAMILY HISTORY:  No pertinent family history in first degree relatives        Social History:      RADIOLOGY:  [ ] Reviewed and interpreted by me    PULMONARY FUNCTION TESTS:    EKG: CHIEF COMPLAINT: Patient is a 73y old  Female who presents with a chief complaint of Respiratory distress.      Interval Events: No interval events noted overnight.      REVIEW OF SYSTEMS:  [x] Unable to assess ROS because vented.       Mode: AC/ CMV (Assist Control/ Continuous Mandatory Ventilation), RR (machine): 20, TV (machine): 300, FiO2: 30, PEEP: 8, ITime: 0.53, MAP: 14, PIP: 39      OBJECTIVE:  ICU Vital Signs Last 24 Hrs  T(C): 37.3 (16 Sep 2023 05:35), Max: 37.8 (15 Sep 2023 15:01)  T(F): 99.1 (16 Sep 2023 05:35), Max: 100.1 (15 Sep 2023 15:01)  HR: 94 (16 Sep 2023 05:35) (94 - 107)  BP: 116/45 (16 Sep 2023 05:35) (110/35 - 126/46)  RR: 20 (16 Sep 2023 05:35) (20 - 20)  SpO2: 99% (16 Sep 2023 05:35) (92% - 103%)    O2 Parameters below as of 16 Sep 2023 05:35  Patient On (Oxygen Delivery Method): ventilator    O2 Concentration (%): 30      Mode: AC/ CMV (Assist Control/ Continuous Mandatory Ventilation), RR (machine): 20, TV (machine): 300, FiO2: 30, PEEP: 8, ITime: 0.53, MAP: 14, PIP: 39    09-15 @ 07:01  -  09-16 @ 07:00  --------------------------------------------------------  IN: 954 mL / OUT: 1100 mL / NET: -146 mL      CAPILLARY BLOOD GLUCOSE      POCT Blood Glucose.: 221 mg/dL (16 Sep 2023 06:04)      HOSPITAL MEDICATIONS:  MEDICATIONS  (STANDING):  albuterol/ipratropium for Nebulization 3 milliLiter(s) Nebulizer every 6 hours  atorvastatin 40 milliGRAM(s) Oral at bedtime  buMETAnide Injectable 2 milliGRAM(s) IV Push two times a day  chlorhexidine 0.12% Liquid 15 milliLiter(s) Oral Mucosa every 12 hours  chlorhexidine 2% Cloths 1 Application(s) Topical daily  ciprofloxacin/hydrocortisone Suspension Otic 4 Drop(s) Left Ear two times a day  dextrose 5%. 1000 milliLiter(s) (50 mL/Hr) IV Continuous <Continuous>  dextrose 5%. 1000 milliLiter(s) (100 mL/Hr) IV Continuous <Continuous>  dextrose 50% Injectable 12.5 Gram(s) IV Push once  dextrose 50% Injectable 25 Gram(s) IV Push once  dextrose 50% Injectable 25 Gram(s) IV Push once  doxazosin 6 milliGRAM(s) Oral <User Schedule>  epoetin odalis (EPOGEN) Injectable 12948 Unit(s) SubCutaneous <User Schedule>  ferrous    sulfate Liquid 300 milliGRAM(s) Enteral Tube daily  FIRST- Mouthwash  BLM 10 milliLiter(s) Swish and Spit four times a day  glucagon  Injectable 1 milliGRAM(s) IntraMuscular once  heparin  Infusion 1000 Unit(s)/Hr (9.5 mL/Hr) IV Continuous <Continuous>  insulin lispro (ADMELOG) corrective regimen sliding scale   SubCutaneous every 6 hours  insulin NPH human recombinant 3 Unit(s) SubCutaneous every 6 hours  mupirocin 2% Ointment 1 Application(s) Both Nostrils two times a day  pantoprazole  Injectable 40 milliGRAM(s) IV Push two times a day  psyllium Powder 1 Packet(s) Oral daily  sodium chloride 2 Gram(s) Oral two times a day  sodium chloride 3%  Inhalation 4 milliLiter(s) Inhalation every 6 hours    MEDICATIONS  (PRN):  acetaminophen   Oral Liquid .. 650 milliGRAM(s) Oral every 6 hours PRN Temp greater or equal to 38C (100.4F), Mild Pain (1 - 3)  dextrose Oral Gel 15 Gram(s) Oral once PRN Blood Glucose LESS THAN 70 milliGRAM(s)/deciliter      LABS:                        7.5    6.16  )-----------( 169      ( 15 Sep 2023 01:37 )             22.9     09-15    123<L>  |  88<L>  |  90<H>  ----------------------------<  211<H>  4.4   |  24  |  2.59<H>    Ca    8.0<L>      15 Sep 2023 16:25  Phos  3.2     09-15  Mg     2.50     09-15      PTT - ( 15 Sep 2023 01:37 )  PTT:79.6 sec  Urinalysis Basic - ( 15 Sep 2023 16:25 )    Color: x / Appearance: x / SG: x / pH: x  Gluc: 211 mg/dL / Ketone: x  / Bili: x / Urobili: x   Blood: x / Protein: x / Nitrite: x   Leuk Esterase: x / RBC: x / WBC x   Sq Epi: x / Non Sq Epi: x / Bacteria: x      PAST MEDICAL & SURGICAL HISTORY:  Breast CA      Diabetes      Stroke      Cardiac arrest      HTN (hypertension)      H/O mastectomy, bilateral      FAMILY HISTORY:  No pertinent family history in first degree relatives      Social History:      RADIOLOGY:  [ ] Reviewed and interpreted by me    PULMONARY FUNCTION TESTS:    EKG:

## 2023-09-17 NOTE — PROGRESS NOTE ADULT - SUBJECTIVE AND OBJECTIVE BOX
Patient is a 73y old  Female who presents with a chief complaint of Respiratory distress (17 Sep 2023 10:59)      SUBJECTIVE / OVERNIGHT EVENTS: no new events    MEDICATIONS  (STANDING):  albuterol/ipratropium for Nebulization 3 milliLiter(s) Nebulizer every 6 hours  atorvastatin 40 milliGRAM(s) Oral at bedtime  buMETAnide Injectable 2 milliGRAM(s) IV Push two times a day  chlorhexidine 0.12% Liquid 15 milliLiter(s) Oral Mucosa every 12 hours  chlorhexidine 2% Cloths 1 Application(s) Topical daily  dextrose 5%. 1000 milliLiter(s) (50 mL/Hr) IV Continuous <Continuous>  dextrose 5%. 1000 milliLiter(s) (100 mL/Hr) IV Continuous <Continuous>  dextrose 50% Injectable 12.5 Gram(s) IV Push once  dextrose 50% Injectable 25 Gram(s) IV Push once  dextrose 50% Injectable 25 Gram(s) IV Push once  doxazosin 6 milliGRAM(s) Oral <User Schedule>  epoetin odalis (EPOGEN) Injectable 79956 Unit(s) SubCutaneous <User Schedule>  ferrous    sulfate Liquid 300 milliGRAM(s) Enteral Tube daily  glucagon  Injectable 1 milliGRAM(s) IntraMuscular once  heparin  Infusion 1000 Unit(s)/Hr (9.5 mL/Hr) IV Continuous <Continuous>  insulin lispro (ADMELOG) corrective regimen sliding scale   SubCutaneous every 6 hours  insulin NPH human recombinant 3 Unit(s) SubCutaneous every 6 hours  meropenem  IVPB 500 milliGRAM(s) IV Intermittent every 12 hours  mupirocin 2% Ointment 1 Application(s) Both Nostrils two times a day  pantoprazole  Injectable 40 milliGRAM(s) IV Push two times a day  psyllium Powder 1 Packet(s) Oral daily  sodium chloride 2 Gram(s) Oral two times a day  sodium chloride 1.5%. 500 milliLiter(s) (50 mL/Hr) IV Continuous <Continuous>  sodium chloride 3%  Inhalation 4 milliLiter(s) Inhalation every 6 hours    MEDICATIONS  (PRN):  acetaminophen   Oral Liquid .. 650 milliGRAM(s) Oral every 6 hours PRN Temp greater or equal to 38C (100.4F), Mild Pain (1 - 3)  dextrose Oral Gel 15 Gram(s) Oral once PRN Blood Glucose LESS THAN 70 milliGRAM(s)/deciliter      Vital Signs Last 24 Hrs  T(F): 98.6 (09-17-23 @ 18:42), Max: 98.8 (09-17-23 @ 01:25)  HR: 94 (09-17-23 @ 19:18) (78 - 104)  BP: 104/37 (09-17-23 @ 18:42) (79/60 - 118/39)  RR: 20 (09-17-23 @ 18:42) (20 - 20)  SpO2: 98% (09-17-23 @ 19:18) (93% - 100%)  Telemetry:   CAPILLARY BLOOD GLUCOSE      POCT Blood Glucose.: 180 mg/dL (17 Sep 2023 17:16)  POCT Blood Glucose.: 218 mg/dL (17 Sep 2023 11:44)  POCT Blood Glucose.: 209 mg/dL (17 Sep 2023 05:16)  POCT Blood Glucose.: 239 mg/dL (16 Sep 2023 23:45)    I&O's Summary    16 Sep 2023 07:01  -  17 Sep 2023 07:00  --------------------------------------------------------  IN: 1418 mL / OUT: 700 mL / NET: 718 mL    17 Sep 2023 07:01  -  17 Sep 2023 20:54  --------------------------------------------------------  IN: 884 mL / OUT: 200 mL / NET: 684 mL        PHYSICAL EXAM:  GENERAL: NAD, well-developed  HEAD:  Atraumatic, Normocephalic  EYES: EOMI, PERRLA, conjunctiva and sclera clear  NECK: Supple, No JVD  CHEST/LUNG: Clear to auscultation bilaterally; No wheeze  HEART: Regular rate and rhythm; No murmurs, rubs, or gallops  ABDOMEN: Soft, Nontender, Nondistended; Bowel sounds present  EXTREMITIES:  2+ Peripheral Pulses, No clubbing, cyanosis, or edema  PSYCH: AAOx3  NEUROLOGY: non-focal  SKIN: No rashes or lesions    LABS:                        7.7    4.28  )-----------( 159      ( 17 Sep 2023 06:47 )             23.6     09-17    123<L>  |  88<L>  |  99<H>  ----------------------------<  205<H>  4.2   |  23  |  2.63<H>    Ca    8.4      17 Sep 2023 06:47  Phos  3.4     09-17  Mg     2.50     09-17      PTT - ( 17 Sep 2023 09:50 )  PTT:67.9 sec      Urinalysis Basic - ( 17 Sep 2023 06:47 )    Color: x / Appearance: x / SG: x / pH: x  Gluc: 205 mg/dL / Ketone: x  / Bili: x / Urobili: x   Blood: x / Protein: x / Nitrite: x   Leuk Esterase: x / RBC: x / WBC x   Sq Epi: x / Non Sq Epi: x / Bacteria: x        RADIOLOGY & ADDITIONAL TESTS:    Imaging Personally Reviewed:    Consultant(s) Notes Reviewed:      Care Discussed with Consultants/Other Providers:

## 2023-09-17 NOTE — PROGRESS NOTE ADULT - ASSESSMENT
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 While in MICU patient noted with worsening renal failure requiring HD initiation, CGE/ Melena s/p EGD 8/31 found with esophagitis and erosive gastropathy, new right cerebellar infarct and fevers second to ESBL COLI and MSSA VAP, MSSA bacteremia, left ear otitis externa and drug rxn to cephalosporins Course further complicated by prolonged vent time s/p tracheostomy 9/1 and transferred to RCU 9/3 completed by recurrent fevers with high PIP, respiratory acidosis and concern for volume overloaded.     NEUROLOGY  # Metabolic Encephalopath vs NEW cerebella infarct   - Baseline MS AOX3 with short term memory changes per family however noted with concern for poor mentation in ICU  - MRI (8/17) with NEW right cerebellar infarct and old left PCA/occipital infarcts  - STEPHANIE (8/17) with AV showing two linear mobile echogenic elements attached to valve leaflets consistent with Lambel's excrescence, but no TRINITY thrombus, and lambel's excrescence with uncertain clinical implication.   - EEG (8/11-8/15) with no seizures   - ILR interrogation (9/7) with SR and PACs falsely recorded as AF.   - Attempted to resume ASA for medical management but held given GIB   - Continue on Lipitor for medical management   - Course complicated by questionable head and body shaking 9/8 however prolactin elevated and shakes head yes/no to some questions.   - Lethargy more likely second to respiratory acidosis and will hold on RPT EEG    - Monitor mentation closely Awakens to verbal stimuli     CARDIOVASCULAR  # HTN Urgency   # Septic vs vasoplegic shock   - Labile BPs ranging in between SBP 80s-200s and attempted labetalol, however noted with hypotension and bradycardia likely from BB toxicity vs shock state?    - Holding Labetalol and Catapres   - c/w Cardura for now     # Hx of HFpEF with likely ADHF with volume overload.   - ECHO (7/2023) with EF 59 with severe LVH and diastolic dysfunction   - STEPHANIE (8/18) with normal LVRVSF however noted with increased LA pressures and persistent LA septal bowing into RA.   - ECHO (8/11) with EF 60 with mild LVH, normal LVRVSF, and mild ddfxn.  - Remove volume with HD sessions and intermittent bumex pushes as BP allows    - c/w Bumex 2mg IV BID for now - Monitor UOP and electrolytes     HEENT   # Left ear OE   - ENT evaluation (9/7) with no mastoid tenderness, however found with positive swelling and excoriation to left auricle and tenderness to manipulation of auricle. Debrided crusting of auricle and EAC evaluated with edema and unable to visualize TM. EAC debrided and found with watery exudate.   - Continue on CiproHC (9/5 - 9/16)   - Continue on Bradford (9/1 - ) as below   - ENT following and ear wick change QD   - Wick out but needs ? debridement wound care called/fu ent   - ENT recommending CT IAC w/ IV con but family is refusing as concerned given CKD, kidneys wouldn't recover from IV contrast. Spoke with Nephrology, ENT, and patient's  at length. Planned for CT non con and per , decision will be made from there but for now doesn't want to risk further harming kidneys.  - CT IAC showing acute on chronic left-sided otitis externa and acute on chronic left-sided otomastoiditis. Superimposed left-sided tympanosclerosis. Superimposed cholesteatoma formation within the left   tympanomastoid cavity cannot be excluded. No evidence of malignant otitis externa.    # Oral Lesions with questionable Zoster vs Trauma?   - Patient with tongue pain and seen with anterior ulcerations consolidating and crusting and posterior ulceration.  - Case discussed with pathology resident and culture/ cytology sent.   - HSV negative and Path (9/6) with normal appearing epithelial cells singly and in clusters devoid of viral cytopathic effect.   - Continue on magic mouth wash for pain    RESPIRATORY  # AHHRF second to volume overload   - Hx of CKD and HFpEF presented with SOB and hypercarbia second to volume overload requiring intubation and HD initiation   - Vent weaning attempted however limited requiring tracheostomy (9/1) with IP   - Course complicated by PIPs elevated (50s) and respiratory acidosis second to secretions vs volume ?    - Vent adjusted 20/300/5/30 without improvement.   - POCUS (9/8) with BL pleural effusions (large on right and small on left)   - CT CHEST (9/8) with TBM, BL pleural effusions and continued consolidations   - Continue on vent and given TBM increased PEEP (9/9) to 20/300/8/40 with overall improvement   - Continue on duonebs and HTS for airway clearance   - Continue volume removal with diuresis and aggressive UF sessions.   - Discussing possible thora but appears improved and will monitor.  ]  - Bedside US showing decrease in pleural effusion - hold off thoracentesis 9/10     GI  # UGIB   - CGE x 1 episode on 8/24 and melena x 2 episodes on 8/31 likely residual blood.   - EGD (8/31) found with esophagitis and erosive gastropathy  - Continue on PPI BID through late October and then PPI QD   - No melena     # Dysphagia   - NGT-TF continued   - Pending PEG however needs improvement in infectious status prior to placement.     RENAL  # Progressive CKD   - Presented volume overload and progressive RUSS on CKD (baseline CRE in 2s and mode into the 3s on admission) likely obstruction vs low flow state with shock vs AIN from drug reaction issue?  - POCUS with no signs of hydronephrosis   - Pressor support started with improvement in renal function  - Attempted steroid course in ICU without improvement in renal function   - Case discussed at length with renal and initiated on HD in ICU and now continued on HD TTHS, however remains volume overloaded.   - Patient oliguric however responds to Bumex pushes   - Continue on aggressive UF sessions with HD TTHS and bumex pushes as BP allows   - Monitor renal function and UOP     Hyponatremia  - c/w Salt tabs - s/p Hypertonic 1.5%NS @ 50cc/hr x 5 hr  - Renal- holding off on HD for now      # Hyperphosphatemia (resolved)   - PTH normal and elevated phos likely from renal failure  - s/p Phos binder with Renvela - now d'beth as level wnl      INFECTIOUS DISEASE  # ESBL ECOLI and MSSA VAP c/b MSSA bacteremia   - RVP/ COVID (8/11) negative   - SCx (8/11) with MSSA s/p cefepime (8/12-8/13) and then ancef (8/14) however noted with drug reaction and then changed to vanco and aztreonam (8/12-8/13) in ICU .   - Course complicated by recurrent fevers and return of MSSA with bacteremia.   - SCx (9/1) with MSSA and ESBL ECOLI   - BAL (9/1) with MSSA   - BCx (9/1) with MSSA and cleared on (9/4)   - ECHO (9/6) unable to rule out endocarditis   - Continue on Bradford (9/4 - ) and Vanco BY DOSE POST HD (9/4, 9/7, 9/9 ...) with duration TBD at this time.   - Given inability to rule out endocarditis will discuss possible 4 wks?   - Course complicated by fever (9/7) and UA with leuks but no bacteria, however compared to prior improved, RVP/COVID and SCx negative, and RPT CXR similar to prior   - Pending BCx, SCx, UCx, and LE DOPPLERS- Blood and sputum cx NTD; Acute left deep vein thrombosis of the left popliteal vein.  - Febrile overnight 102 recultured and sent off   -Pt receiving vanco post HD however today given vanco 750mg x 1 will check Vanco level at end of HD session and dose   -     # Left ear OE   - ENT evaluation (9/7) with no mastoid tenderness, however found with positive swelling and excoriation to left auricle and tenderness to manipulation of auricle. Debrided crusting of auricle and EAC evaluated with edema and unable to visualize TM. EAC debrided and found with watery exudate.   - Continue on CiproHC (9/5 - )   - Continue on Bradford (9/1 - ) as below     # MRSA PCRs   - MRSA PCR (8/11 and 8/14) negative   - Next MRSA + PCR ordered for 10/8     HEME  # Anemia second to GIB vs renal disease   - Hold chemical DVT PPX given bleeding/ waxing and waning HH   - s/p multiple units of PRBCs (8/15, 8/21, 8/28, 8/31 and 9/8)   - Anemia panel with AOCD but with low %sat and ferrous sulfate added to optimize   - Monitor HH and discuss with renal for EPO sometime next week.   - SPoke with GI for clearance to start Heparin gtt for + DVT     Vascular  # DVT  - Pt with + popliteal DVT on SCD Spoke with GI no absolute contraindication for starting Heparin - advise close monitoring for bleeding - Do stat CTA if bleeding occurs and call IR   - Pt specific heparin gtt started with goal PTT 60-85  - will monitor closely     ONC   # Hx of Breast CA   - Patient dx in 2018 and s/p BL mastectomy (radical on right) and chemo and RT.   - Supportive care.     ENDOCRINE  # IDDM2   - Continue on NPH 3U and ISS   - Monitor FS and adjust as needed     LINES/ TUBES  - R ARTHUR TAYLOR (8/19 - )     SKIN  # Drug Eruption   - Attempted cefepime (8/12) vs ancef (8/13-8/14) and then noted with drug rxn.   - Hold further cephalosporins at this time   - s/p Steroids with prednisone 40 (8/18 - 9/2) then prednisone 30 (9/3-9/7), then prednisone 20 (9/8 - 9/10), then prednisone 10mg (9/11-9/13) then turn off.     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 While in MICU patient noted with worsening renal failure requiring HD initiation, CGE/ Melena s/p EGD 8/31 found with esophagitis and erosive gastropathy, new right cerebellar infarct and fevers second to ESBL COLI and MSSA VAP, MSSA bacteremia, left ear otitis externa and drug rxn to cephalosporins Course further complicated by prolonged vent time s/p tracheostomy 9/1 and transferred to RCU 9/3 completed by recurrent fevers with high PIP, respiratory acidosis and concern for volume overloaded.     NEUROLOGY  # Metabolic Encephalopath vs NEW cerebella infarct   - Baseline MS AOX3 with short term memory changes per family however noted with concern for poor mentation in ICU  - MRI (8/17) with NEW right cerebellar infarct and old left PCA/occipital infarcts  - STEPHANIE (8/17) with AV showing two linear mobile echogenic elements attached to valve leaflets consistent with Lambel's excrescence, but no TRINITY thrombus, and lambel's excrescence with uncertain clinical implication.   - EEG (8/11-8/15) with no seizures   - ILR interrogation (9/7) with SR and PACs falsely recorded as AF.   - Attempted to resume ASA for medical management but held given GIB   - Continue on Lipitor for medical management   - Course complicated by questionable head and body shaking 9/8 however prolactin elevated and shakes head yes/no to some questions.   - Lethargy more likely second to respiratory acidosis and will hold on RPT EEG    - Monitor mentation closely Awakens to verbal stimuli     CARDIOVASCULAR  # HTN Urgency   # Septic vs vasoplegic shock   - Labile BPs ranging in between SBP 80s-200s and attempted labetalol, however noted with hypotension and bradycardia likely from BB toxicity vs shock state?    - Holding Labetalol and Catapres   - c/w Cardura for now     # Hx of HFpEF with likely ADHF with volume overload.   - ECHO (7/2023) with EF 59 with severe LVH and diastolic dysfunction   - STEPHANIE (8/18) with normal LVRVSF however noted with increased LA pressures and persistent LA septal bowing into RA.   - ECHO (8/11) with EF 60 with mild LVH, normal LVRVSF, and mild ddfxn.  - Remove volume with HD sessions and intermittent bumex pushes as BP allows    - c/w Bumex 2mg IV BID for now - Monitor UOP and electrolytes     HEENT   # Left ear OE   - ENT evaluation (9/7) with no mastoid tenderness, however found with positive swelling and excoriation to left auricle and tenderness to manipulation of auricle. Debrided crusting of auricle and EAC evaluated with edema and unable to visualize TM. EAC debrided and found with watery exudate.   - Continue on CiproHC (9/5 - 9/16)   - Continue on Bradford (9/1 - ) as below   - ENT following and ear wick change QD   - Wick out but needs ? debridement wound care called/fu ent   - ENT recommending CT IAC w/ IV con but family is refusing as concerned given CKD, kidneys wouldn't recover from IV contrast. Spoke with Nephrology, ENT, and patient's  at length. Planned for CT non con and per , decision will be made from there but for now doesn't want to risk further harming kidneys.  - CT IAC showing acute on chronic left-sided otitis externa and acute on chronic left-sided otomastoiditis. Superimposed left-sided tympanosclerosis. Superimposed cholesteatoma formation within the left   tympanomastoid cavity cannot be excluded. No evidence of malignant otitis externa.    # Oral Lesions with questionable Zoster vs Trauma?   - Patient with tongue pain and seen with anterior ulcerations consolidating and crusting and posterior ulceration.  - Case discussed with pathology resident and culture/ cytology sent.   - HSV negative and Path (9/6) with normal appearing epithelial cells singly and in clusters devoid of viral cytopathic effect.   - Continue on magic mouth wash for pain    RESPIRATORY  # AHHRF second to volume overload   - Hx of CKD and HFpEF presented with SOB and hypercarbia second to volume overload requiring intubation and HD initiation   - Vent weaning attempted however limited requiring tracheostomy (9/1) with IP   - Course complicated by PIPs elevated (50s) and respiratory acidosis second to secretions vs volume ?    - Vent adjusted 20/300/5/30 without improvement.   - POCUS (9/8) with BL pleural effusions (large on right and small on left)   - CT CHEST (9/8) with TBM, BL pleural effusions and continued consolidations   - Continue on vent and given TBM increased PEEP (9/9) to 20/300/8/40 with overall improvement   - Continue on duonebs and HTS for airway clearance   - Continue volume removal with diuresis and aggressive UF sessions.   - Discussing possible thora but appears improved and will monitor.  ]  - Bedside US showing decrease in pleural effusion - hold off thoracentesis 9/10     GI  # UGIB   - CGE x 1 episode on 8/24 and melena x 2 episodes on 8/31 likely residual blood.   - EGD (8/31) found with esophagitis and erosive gastropathy  - Continue on PPI BID through late October and then PPI QD   - No melena     # Dysphagia   - NGT-TF continued   - Pending PEG however needs improvement in infectious status prior to placement.     RENAL  # Progressive CKD   - Presented volume overload and progressive RUSS on CKD (baseline CRE in 2s and mode into the 3s on admission) likely obstruction vs low flow state with shock vs AIN from drug reaction issue?  - POCUS with no signs of hydronephrosis   - Pressor support started with improvement in renal function  - Attempted steroid course in ICU without improvement in renal function   - Case discussed at length with renal and initiated on HD in ICU and now continued on HD TTHS, however remains volume overloaded.   - Patient oliguric however responds to Bumex pushes   - Continue on aggressive UF sessions with HD TTHS and bumex pushes as BP allows   - Monitor renal function and UOP     Hyponatremia  - c/w Salt tabs - s/p Hypertonic 1.5%NS @ 50cc/hr x 5 hr  - Renal- holding off on HD for now      # Hyperphosphatemia (resolved)   - PTH normal and elevated phos likely from renal failure  - s/p Phos binder with Renvela - now d'beth as level wnl      INFECTIOUS DISEASE  # ESBL ECOLI and MSSA VAP c/b MSSA bacteremia   - RVP/ COVID (8/11) negative   - SCx (8/11) with MSSA s/p cefepime (8/12-8/13) and then ancef (8/14) however noted with drug reaction and then changed to vanco and aztreonam (8/12-8/13) in ICU .   - Course complicated by recurrent fevers and return of MSSA with bacteremia.   - SCx (9/1) with MSSA and ESBL ECOLI   - BAL (9/1) with MSSA   - BCx (9/1) with MSSA and cleared on (9/4)   - ECHO (9/6) unable to rule out endocarditis   - Continue on Bradford (9/4 - ) and Vanco BY DOSE POST HD (9/4, 9/7, 9/9 ...) with duration TBD at this time.   - Given inability to rule out endocarditis will discuss possible 4 wks?   - Course complicated by fever (9/7) and UA with leuks but no bacteria, however compared to prior improved, RVP/COVID and SCx negative, and RPT CXR similar to prior   - Pending BCx, SCx, UCx, and LE DOPPLERS- Blood and sputum cx NTD; Acute left deep vein thrombosis of the left popliteal vein.  - Febrile overnight 102 recultured and sent off   -Pt receiving vanco post HD however today given vanco 750mg x 1 will check Vanco level at end of HD session and dose   -     # Left ear OE   - ENT evaluation (9/7) with no mastoid tenderness, however found with positive swelling and excoriation to left auricle and tenderness to manipulation of auricle. Debrided crusting of auricle and EAC evaluated with edema and unable to visualize TM. EAC debrided and found with watery exudate.   - Continue on CiproHC (9/5 - )   - Continue on Bradford (9/1 - ) as below     # MRSA PCRs   - MRSA PCR (8/11 and 8/14) negative   - Next MRSA + PCR ordered for 10/8     HEME  # Anemia second to GIB vs renal disease   - Hold chemical DVT PPX given bleeding/ waxing and waning HH   - s/p multiple units of PRBCs (8/15, 8/21, 8/28, 8/31 and 9/8)   - Anemia panel with AOCD but with low %sat and ferrous sulfate added to optimize   - Monitor HH and discuss with renal for EPO sometime next week.   - SPoke with GI for clearance to start Heparin gtt for + DVT     Vascular  # DVT  - Pt with + popliteal DVT on SCD Spoke with GI no absolute contraindication for starting Heparin - advise close monitoring for bleeding - Do stat CTA if bleeding occurs and call IR   - Pt specific heparin gtt started with goal PTT 60-85  - will monitor closely     ONC   # Hx of Breast CA   - Patient dx in 2018 and s/p BL mastectomy (radical on right) and chemo and RT.   - Supportive care.     ENDOCRINE  # IDDM2   - Continue on NPH 3U and ISS   - Monitor FS and adjust as needed     LINES/ TUBES  - R ARTHUR TAYLOR (8/19 - )     SKIN  # Drug Eruption   - Attempted cefepime (8/12) vs ancef (8/13-8/14) and then noted with drug rxn.   - Hold further cephalosporins at this time   - s/p Steroids with prednisone 40 (8/18 - 9/2) then prednisone 30 (9/3-9/7), then prednisone 20 (9/8 - 9/10), then prednisone 10mg (9/11-9/13) then turn off.     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 While in MICU patient noted with worsening renal failure requiring HD initiation, CGE/ Melena s/p EGD 8/31 found with esophagitis and erosive gastropathy, new right cerebellar infarct and fevers second to ESBL COLI and MSSA VAP, MSSA bacteremia, left ear otitis externa and drug rxn to cephalosporins Course further complicated by prolonged vent time s/p tracheostomy 9/1 and transferred to RCU 9/3 completed by recurrent fevers with high PIP, respiratory acidosis and concern for volume overloaded.     NEUROLOGY  # Metabolic Encephalopath vs NEW cerebella infarct   - Baseline MS AOX3 with short term memory changes per family however noted with concern for poor mentation in ICU  - MRI (8/17) with NEW right cerebellar infarct and old left PCA/occipital infarcts  - STEPHANIE (8/17) with AV showing two linear mobile echogenic elements attached to valve leaflets consistent with Lambel's excrescence, but no TRINITY thrombus, and lambel's excrescence with uncertain clinical implication.   - EEG (8/11-8/15) with no seizures   - ILR interrogation (9/7) with SR and PACs falsely recorded as AF.   - Attempted to resume ASA for medical management but held given GIB   - Continue on Lipitor for medical management   - Course complicated by questionable head and body shaking 9/8 however prolactin elevated and shakes head yes/no to some questions.   - Lethargy more likely second to respiratory acidosis and will hold on RPT EEG    - Monitor mentation closely Awakens to verbal stimuli     CARDIOVASCULAR  # HTN Urgency   # Septic vs vasoplegic shock   - Labile BPs ranging in between SBP 80s-200s and attempted labetalol, however noted with hypotension and bradycardia likely from BB toxicity vs shock state?    - Holding Labetalol and Catapres   - c/w Cardura for now     # Hx of HFpEF with likely ADHF with volume overload.   - ECHO (7/2023) with EF 59 with severe LVH and diastolic dysfunction   - STEPHANIE (8/18) with normal LVRVSF however noted with increased LA pressures and persistent LA septal bowing into RA.   - ECHO (8/11) with EF 60 with mild LVH, normal LVRVSF, and mild ddfxn.  - Remove volume with HD sessions and intermittent bumex pushes as BP allows    - c/w Bumex 2mg IV BID for now - Monitor UOP and electrolytes     HEENT   # Left ear OE   - ENT evaluation (9/7) with no mastoid tenderness, however found with positive swelling and excoriation to left auricle and tenderness to manipulation of auricle. Debrided crusting of auricle and EAC evaluated with edema and unable to visualize TM. EAC debrided and found with watery exudate.   - Continue on CiproHC (9/5 - 9/16)   - Continue on Bradford (9/1 - ) as below   - ENT following and ear wick change QD   - Wick out but needs ? debridement wound care called/fu ent   - ENT recommending CT IAC w/ IV con but family is refusing as concerned given CKD, kidneys wouldn't recover from IV contrast. Spoke with Nephrology, ENT, and patient's  at length. Planned for CT non con and per , decision will be made from there but for now doesn't want to risk further harming kidneys.  - CT IAC showing acute on chronic left-sided otitis externa and acute on chronic left-sided otomastoiditis. Superimposed left-sided tympanosclerosis. Superimposed cholesteatoma formation within the left   tympanomastoid cavity cannot be excluded. No evidence of malignant otitis externa.    # Oral Lesions with questionable Zoster vs Trauma?   - Patient with tongue pain and seen with anterior ulcerations consolidating and crusting and posterior ulceration.  - Case discussed with pathology resident and culture/ cytology sent.   - HSV negative and Path (9/6) with normal appearing epithelial cells singly and in clusters devoid of viral cytopathic effect.   - Continue on magic mouth wash for pain    RESPIRATORY  # AHHRF second to volume overload   - Hx of CKD and HFpEF presented with SOB and hypercarbia second to volume overload requiring intubation and HD initiation   - Vent weaning attempted however limited requiring tracheostomy (9/1) with IP   - Course complicated by PIPs elevated (50s) and respiratory acidosis second to secretions vs volume ?    - Vent adjusted 20/300/5/30 without improvement.   - POCUS (9/8) with BL pleural effusions (large on right and small on left)   - CT CHEST (9/8) with TBM, BL pleural effusions and continued consolidations   - Continue on vent and given TBM increased PEEP (9/9) to 20/300/8/40 with overall improvement   - Continue on Duoneb and HTS for airway clearance   - Continue volume removal with diuresis and aggressive UF sessions.   - Discussing possible thora but appears improved and will monitor.  ]  - Bedside US showing decrease in pleural effusion - hold off thoracentesis 9/10     GI  # UGIB   - CGE x 1 episode on 8/24 and melena x 2 episodes on 8/31 likely residual blood.   - EGD (8/31) found with esophagitis and erosive gastropathy  - Continue on PPI BID through late October and then PPI QD   - No melena     # Dysphagia   - NGT-TF continued   - Pending PEG however needs improvement in infectious status prior to placement.     RENAL  # Progressive CKD   - Presented volume overload and progressive RUSS on CKD (baseline CRE in 2s and mode into the 3s on admission) likely obstruction vs low flow state with shock vs AIN from drug reaction issue?  - POCUS with no signs of hydronephrosis   - Pressor support started with improvement in renal function  - Attempted steroid course in ICU without improvement in renal function   - Case discussed at length with renal and initiated on HD in ICU and now continued on HD TTHS, however remains volume overloaded.   - Patient oliguric however responds to Bumex pushes   - Continue on aggressive UF sessions with HD TTHS and bumex pushes as BP allows   - Monitor renal function and UOP     Hyponatremia  - c/w Salt tabs - s/p Hypertonic 1.5%NS @ 50cc/hr x 5 hr  - Renal- holding off on HD for now      # Hyperphosphatemia (resolved)   - PTH normal and elevated phos likely from renal failure  - s/p Phos binder with Renvela - now d'beth as level wnl      INFECTIOUS DISEASE  # ESBL ECOLI and MSSA VAP c/b MSSA bacteremia   - RVP/ COVID (8/11) negative   - SCx (8/11) with MSSA s/p cefepime (8/12-8/13) and then ancef (8/14) however noted with drug reaction and then changed to vanco and aztreonam (8/12-8/13) in ICU .   - Course complicated by recurrent fevers and return of MSSA with bacteremia.   - SCx (9/1) with MSSA and ESBL ECOLI   - BAL (9/1) with MSSA   - BCx (9/1) with MSSA and cleared on (9/4)   - ECHO (9/6) unable to rule out endocarditis   - Continue on Bradford (9/4 - ) and Vanco BY DOSE POST HD (9/4, 9/7, 9/9 ...) with duration TBD at this time.   - Given inability to rule out endocarditis will discuss possible 4 wks?   - Course complicated by fever (9/7) and UA with leuks but no bacteria, however compared to prior improved, RVP/COVID and SCx negative, and RPT CXR similar to prior   - Pending BCx, SCx, UCx, and LE DOPPLERS- Blood and sputum cx NTD; Acute left deep vein thrombosis of the left popliteal vein.  - Febrile overnight 102 recultured and sent off   -Pt receiving vanco post HD however today given vanco 750mg x 1 will check Vanco level at end of HD session and dose   -     # Left ear OE   - ENT evaluation (9/7) with no mastoid tenderness, however found with positive swelling and excoriation to left auricle and tenderness to manipulation of auricle. Debrided crusting of auricle and EAC evaluated with edema and unable to visualize TM. EAC debrided and found with watery exudate.   - Continue on CiproHC (9/5 - )   - Continue on Bradford (9/1 - ) as below     # MRSA PCRs   - MRSA PCR (8/11 and 8/14) negative   - Next MRSA + PCR ordered for 10/8     HEME  # Anemia second to GIB vs renal disease   - Hold chemical DVT PPX given bleeding/ waxing and waning HH   - s/p multiple units of PRBCs (8/15, 8/21, 8/28, 8/31 and 9/8)   - Anemia panel with AOCD but with low %sat and ferrous sulfate added to optimize   - Monitor HH and discuss with renal for EPO sometime next week.   - SPoke with GI for clearance to start Heparin gtt for + DVT     Vascular  # DVT  - Pt with + popliteal DVT on SCD Spoke with GI no absolute contraindication for starting Heparin - advise close monitoring for bleeding - Do stat CTA if bleeding occurs and call IR   - Pt specific heparin gtt started with goal PTT 60-85  - will monitor closely     ONC   # Hx of Breast CA   - Patient dx in 2018 and s/p BL mastectomy (radical on right) and chemo and RT.   - Supportive care.     ENDOCRINE  # IDDM2   - Continue on NPH 3U and ISS   - Monitor FS and adjust as needed     LINES/ TUBES  - R ARTHUR TAYLOR (8/19 - )     SKIN  # Drug Eruption   - Attempted cefepime (8/12) vs ancef (8/13-8/14) and then noted with drug rxn.   - Hold further cephalosporins at this time   - s/p Steroids with prednisone 40 (8/18 - 9/2) then prednisone 30 (9/3-9/7), then prednisone 20 (9/8 - 9/10), then prednisone 10mg (9/11-9/13) then turn off.     ETHICS/ GOC    - FULL CODE     DISPO - TBD  *******************************  9/16-renal issues continue  9/17-no new issues

## 2023-09-17 NOTE — PROGRESS NOTE ADULT - NS ATTEND AMEND GEN_ALL_CORE FT
as above:  Pt is a 73F with PMHx IDDMII, CKD, hx CVA, CHFpEF, latent TB (s/p tx,) hx breast cancer (2018, s/p mastectomy and adjuvant CT/RT), and hx CVA s/p trach/PEG (now decannulated) initially presenting to [facility] on 8/11/23 with acute hypoxemic and hypercapnic respiratory failure s/p ETT intubation/MV and ARF with pulmonary edema c/b HTN emergency requiring nicardipine drip transferred to MICU for further management.     Pt initially p/w worsening RUE shaking and dysconjugate gaze with MRI 8/17 (+) new right cerebellar, midbrain, and multiple tiny embolic infarcts as well as known left PCA CVA. EEG (-). 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 ILR in place from prior CVA evaluation, last interrogated 9/7 without AF. At baseline, pt reportedly wheelchair bound with RUE tremors and some ADL assistance. Pt was unable to tolerate ASA 2/2 GIB, will re-attempt once more stable.     Pt with acute hypoxemic and hypercapnic respiratory failure s/p ETT intubation/MV possibly 2/2 flash pulmonary edema in the setting of HTN emergency +/- aspiration event. Pt with failure of ventilatory weaning now s/p tracheostomy placement (IP 9/1 with sutures now removed). Of note, pt also noted to have evidence of midbrain CVA with possible concern for central effect on respiratory drive, will monitor carefully and continue weaning trials as tolerated. Pt able to spontaneously breath intermittently, will continue to attempt further weaning trials. Airway clearance therapy in place. Wean O2 supplementation for goal O2 saturation 90-95%. Oral hygiene and aspiration precautions in place. Trach care and suctioning as per RCU team.     Pt p/w ARF on CKD requiring initiation of HD 2/2 pulmonary edema and metabolic acidosis with metabolic encephalopathy. No urgent indication for HD today, further HD as per nephrology team. Finishing prednisone taper for possible AIN. Strict I/Os, pt makes small amount of urine. Now with acute hypervolemic hyponatremia in the setting of renal failure, partially response to diuretics. Agree with nephrology recs, added salt tabs and hypertonic saline x1 9/14 with repeat labs showing uptrending hyponatremia. Will c/w diuretics + salt tabs with BMP q12hr. Renal following    Hospital course further c/b recurrent infections, most recently with MSSA bacteremia (9/1, cleared 9/4 and 9/8) with (+) MSSA and ESBL EColi in BAL as well. Pt with possible cefepime vs. cefazolin drug-induced rash followed by transition to vancomycin. Was then found to have left otitis externa (9/5) now requiring serial bedside debridement with worsening necrotic tissue/erythema for which she was also initiated on meropenem. CTTB pending, family refusing contrast, non-contrast study obtained with evidence of bone involvement but no indication for further surgical intervention. Cultures NTD, pt remains febrile intermittently. Will continue with meropenem, duration to be determined given concern for osteo. Ciprodex otic drops x10 day course. Plan to complete vancomycin by level x4 week course through 10/2 (given inability to exclude endocarditis).     Pt also with oropharyngeal dysphagia requiring NGTs followed by UGIB s/p EGD (8/31) found to have esophagitis/erosive gastropathy now on PPI BID. Pt further found to have popliteal DVT. No c/i by GI for A/C, pt initiated on heparin drip with patient-specific pTTs. Will need to ultimately transition to Coumadin x3-6 months after PEG placement.      Dispo pending medical optimization. Pt full code. DVT ppx full A/C with heparin drip. Discussed with pt's family ( and daughter) at bedside daily.  Travis Stratton MD-Pulmonary   [phone] as above: no new events  Pt is a 73F with PMHx IDDMII, CKD, hx CVA, CHFpEF, latent TB (s/p tx,) hx breast cancer (2018, s/p mastectomy and adjuvant CT/RT), and hx CVA s/p trach/PEG (now decannulated) initially presenting to [facility] on 8/11/23 with acute hypoxemic and hypercapnic respiratory failure s/p ETT intubation/MV and ARF with pulmonary edema c/b HTN emergency requiring nicardipine drip transferred to MICU for further management.     Pt initially p/w worsening RUE shaking and dysconjugate gaze with MRI 8/17 (+) new right cerebellar, midbrain, and multiple tiny embolic infarcts as well as known left PCA CVA. EEG (-). 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 ILR in place from prior CVA evaluation, last interrogated 9/7 without AF. At baseline, pt reportedly wheelchair bound with RUE tremors and some ADL assistance. Pt was unable to tolerate ASA 2/2 GIB, will re-attempt once more stable.     Pt with acute hypoxemic and hypercapnic respiratory failure s/p ETT intubation/MV possibly 2/2 flash pulmonary edema in the setting of HTN emergency +/- aspiration event. Pt with failure of ventilatory weaning now s/p tracheostomy placement (IP 9/1 with sutures now removed). Of note, pt also noted to have evidence of midbrain CVA with possible concern for central effect on respiratory drive, will monitor carefully and continue weaning trials as tolerated. Pt able to spontaneously breath intermittently, will continue to attempt further weaning trials. Airway clearance therapy in place. Wean O2 supplementation for goal O2 saturation 90-95%. Oral hygiene and aspiration precautions in place. Trach care and suctioning as per RCU team.     Pt p/w ARF on CKD requiring initiation of HD 2/2 pulmonary edema and metabolic acidosis with metabolic encephalopathy. No urgent indication for HD today, further HD as per nephrology team. Finishing prednisone taper for possible AIN. Strict I/Os, pt makes small amount of urine. Now with acute hypervolemic hyponatremia in the setting of renal failure, partially response to diuretics. Agree with nephrology recs, added salt tabs and hypertonic saline x1 9/14 with repeat labs showing uptrending hyponatremia. Will c/w diuretics + salt tabs with BMP q12hr. Renal following    Hospital course further c/b recurrent infections, most recently with MSSA bacteremia (9/1, cleared 9/4 and 9/8) with (+) MSSA and ESBL EColi in BAL as well. Pt with possible cefepime vs. cefazolin drug-induced rash followed by transition to vancomycin. Was then found to have left otitis externa (9/5) now requiring serial bedside debridement with worsening necrotic tissue/erythema for which she was also initiated on meropenem. CTTB pending, family refusing contrast, non-contrast study obtained with evidence of bone involvement but no indication for further surgical intervention. Cultures NTD, pt remains febrile intermittently. Will continue with meropenem, duration to be determined given concern for osteo. Ciprodex otic drops x10 day course. Plan to complete vancomycin by level x4 week course through 10/2 (given inability to exclude endocarditis).     Pt also with oropharyngeal dysphagia requiring NGTs followed by UGIB s/p EGD (8/31) found to have esophagitis/erosive gastropathy now on PPI BID. Pt further found to have popliteal DVT. No c/i by GI for A/C, pt initiated on heparin drip with patient-specific pTTs. Will need to ultimately transition to Coumadin x3-6 months after PEG placement.      Dispo pending medical optimization. Pt full code. DVT ppx full A/C with heparin drip. Discussed with pt's family ( and daughter) at bedside daily.  Travis Stratton MD-Pulmonary   [phone]

## 2023-09-17 NOTE — PROGRESS NOTE ADULT - ASSESSMENT
trached to vent  NAD  afebrile   at bedside    acetaminophen   Oral Liquid .. 650 milliGRAM(s) Oral every 6 hours PRN  albuterol/ipratropium for Nebulization 3 milliLiter(s) Nebulizer every 6 hours  atorvastatin 40 milliGRAM(s) Oral at bedtime  buMETAnide Injectable 2 milliGRAM(s) IV Push two times a day  chlorhexidine 0.12% Liquid 15 milliLiter(s) Oral Mucosa every 12 hours  chlorhexidine 2% Cloths 1 Application(s) Topical daily  dextrose 5%. 1000 milliLiter(s) IV Continuous <Continuous>  dextrose 5%. 1000 milliLiter(s) IV Continuous <Continuous>  dextrose 50% Injectable 12.5 Gram(s) IV Push once  dextrose 50% Injectable 25 Gram(s) IV Push once  dextrose 50% Injectable 25 Gram(s) IV Push once  dextrose Oral Gel 15 Gram(s) Oral once PRN  doxazosin 6 milliGRAM(s) Oral <User Schedule>  epoetin odalis (EPOGEN) Injectable 32966 Unit(s) SubCutaneous <User Schedule>  ferrous    sulfate Liquid 300 milliGRAM(s) Enteral Tube daily  glucagon  Injectable 1 milliGRAM(s) IntraMuscular once  heparin  Infusion 1000 Unit(s)/Hr IV Continuous <Continuous>  insulin lispro (ADMELOG) corrective regimen sliding scale   SubCutaneous every 6 hours  insulin NPH human recombinant 3 Unit(s) SubCutaneous every 6 hours  meropenem  IVPB 500 milliGRAM(s) IV Intermittent every 12 hours  mupirocin 2% Ointment 1 Application(s) Both Nostrils two times a day  pantoprazole  Injectable 40 milliGRAM(s) IV Push two times a day  psyllium Powder 1 Packet(s) Oral daily  sodium chloride 2 Gram(s) Oral two times a day  sodium chloride 3%  Inhalation 4 milliLiter(s) Inhalation every 6 hours      VITAL:  T(C): , Max: 37.1 (09-16-23 @ 18:20)  T(F): , Max: 98.8 (09-16-23 @ 18:20)  HR: 94 (09-17-23 @ 06:53)  BP: 111/69 (09-17-23 @ 06:10)  BP(mean): --  RR: 20 (09-17-23 @ 06:10)  SpO2: 95% (09-17-23 @ 06:53)  Wt(kg): --    09-16-23 @ 07:01  -  09-17-23 @ 07:00  --------------------------------------------------------  IN: 1418 mL / OUT: 700 mL / NET: 718 mL        PHYSICAL EXAM:  Constitutional: trach to vent  HEENT: + tracheostomy, + NGT  Respiratory: Coarse BS  Cardiovascular: S1 and S2  Gastrointestinal: BS+, soft, NT/ND  Extremities: + peripheral edema  Neurological: UTO  : +external catheter   Skin: No rashes  Access: [facility] HD catheter (8/19)  LABS:                          7.7    4.28  )-----------( 159      ( 17 Sep 2023 06:47 )             23.6     Na(123)/K(4.2)/Cl(88)/HCO3(23)/BUN(99)/Cr(2.63)Glu(205)/Ca(8.4)/Mg(2.50)/PO4(3.4)    09-17 @ 06:47  Na(123)/K(4.1)/Cl(89)/HCO3(22)/BUN(92)/Cr(2.63)Glu(222)/Ca(8.1)/Mg(2.40)/PO4(3.2)    09-16 @ 06:00  Na(123)/K(4.4)/Cl(88)/HCO3(24)/BUN(90)/Cr(2.59)Glu(211)/Ca(8.0)/Mg(2.50)/PO4(3.2)    09-15 @ 16:25  Na(123)/K(4.0)/Cl(88)/HCO3(22)/BUN(88)/Cr(2.59)Glu(229)/Ca(7.7)/Mg(2.40)/PO4(3.0)    09-15 @ 01:37  Na(122)/K(4.3)/Cl(88)/HCO3(23)/BUN(88)/Cr(2.74)Glu(167)/Ca(8.0)/Mg(2.50)/PO4(3.2)    09-14 @ 13:33    Urinalysis Basic - ( 17 Sep 2023 06:47 )    Color: x / Appearance: x / SG: x / pH: x  Gluc: 205 mg/dL / Ketone: x  / Bili: x / Urobili: x   Blood: x / Protein: x / Nitrite: x   Leuk Esterase: x / RBC: x / WBC x   Sq Epi: x / Non Sq Epi: x / Bacteria: x            ASSESSMENT/PLAN  IMPRESSION: 73F w/ HTN, DM2, CVA, breast CA-bilateral mastectomy, recurrent aspiration pneumonia/respiratory failure, and CKD, 8/11/23 p/w acute hypercapnic respiratory failure; c/b RUSS    (1)CKD - stage 4     (2)RUSS - ATN vs AIN -BUN and creatinine elevated w/ electrolyte derangements, (hyponatremia).  Last HD 9/9/23    (3)Hyponatremia - Na+ stable but warranting another run of IVF    (4)Hypocalcemia- improving    (5)Pulm- hypercapnic respiratory failure on admission - now s/p trach; remains on vent     (6)CV - normotensive     (7)ID - on IV Vanco.  BCx w/ NGTD    (8)Anemia - hgb improving slight down s/p PRBC 9/14    RECOMMEND:  (1)No HD today   (2)1.5% NS 50cc/hr x 5hours  (3)Bumex as ordered  (4)Continue NaCl tabs 2gm bid via NGT  (5)Epogen as ordered   (6)Dose new meds for GFR <10/HD      Nas Corona NP-BC  [employer]  [phone]

## 2023-09-17 NOTE — PROGRESS NOTE ADULT - SUBJECTIVE AND OBJECTIVE BOX
CHIEF COMPLAINT: Patient is a 73y old  Female who presents with a chief complaint of Respiratory distress (17 Sep 2023 05:26)      Interval Events:    REVIEW OF SYSTEMS:  [ ] All other systems negative  [ ] Unable to assess ROS because ________    Mode: AC/ CMV (Assist Control/ Continuous Mandatory Ventilation), RR (machine): 20, TV (machine): 300, FiO2: 30, PEEP: 8, ITime: 0.53, MAP: 15, PIP: 42      OBJECTIVE:  ICU Vital Signs Last 24 Hrs  T(C): 37.1 (17 Sep 2023 06:10), Max: 37.1 (16 Sep 2023 18:20)  T(F): 98.7 (17 Sep 2023 06:10), Max: 98.8 (16 Sep 2023 18:20)  HR: 94 (17 Sep 2023 06:53) (90 - 98)  BP: 111/69 (17 Sep 2023 06:10) (99/50 - 124/33)  BP(mean): --  ABP: --  ABP(mean): --  RR: 20 (17 Sep 2023 06:10) (20 - 20)  SpO2: 95% (17 Sep 2023 06:53) (92% - 99%)    O2 Parameters below as of 17 Sep 2023 06:10  Patient On (Oxygen Delivery Method): ventilator    O2 Concentration (%): 30      Mode: AC/ CMV (Assist Control/ Continuous Mandatory Ventilation), RR (machine): 20, TV (machine): 300, FiO2: 30, PEEP: 8, ITime: 0.53, MAP: 15, PIP: 42    09-16 @ 07:01  -  09-17 @ 07:00  --------------------------------------------------------  IN: 1418 mL / OUT: 700 mL / NET: 718 mL      CAPILLARY BLOOD GLUCOSE      POCT Blood Glucose.: 209 mg/dL (17 Sep 2023 05:16)      HOSPITAL MEDICATIONS:  MEDICATIONS  (STANDING):  albuterol/ipratropium for Nebulization 3 milliLiter(s) Nebulizer every 6 hours  atorvastatin 40 milliGRAM(s) Oral at bedtime  buMETAnide Injectable 2 milliGRAM(s) IV Push two times a day  chlorhexidine 0.12% Liquid 15 milliLiter(s) Oral Mucosa every 12 hours  chlorhexidine 2% Cloths 1 Application(s) Topical daily  dextrose 5%. 1000 milliLiter(s) (50 mL/Hr) IV Continuous <Continuous>  dextrose 5%. 1000 milliLiter(s) (100 mL/Hr) IV Continuous <Continuous>  dextrose 50% Injectable 12.5 Gram(s) IV Push once  dextrose 50% Injectable 25 Gram(s) IV Push once  dextrose 50% Injectable 25 Gram(s) IV Push once  doxazosin 6 milliGRAM(s) Oral <User Schedule>  epoetin odalis (EPOGEN) Injectable 19053 Unit(s) SubCutaneous <User Schedule>  ferrous    sulfate Liquid 300 milliGRAM(s) Enteral Tube daily  glucagon  Injectable 1 milliGRAM(s) IntraMuscular once  heparin  Infusion 1000 Unit(s)/Hr (9.5 mL/Hr) IV Continuous <Continuous>  insulin lispro (ADMELOG) corrective regimen sliding scale   SubCutaneous every 6 hours  insulin NPH human recombinant 3 Unit(s) SubCutaneous every 6 hours  meropenem  IVPB 500 milliGRAM(s) IV Intermittent every 12 hours  mupirocin 2% Ointment 1 Application(s) Both Nostrils two times a day  pantoprazole  Injectable 40 milliGRAM(s) IV Push two times a day  psyllium Powder 1 Packet(s) Oral daily  sodium chloride 2 Gram(s) Oral two times a day  sodium chloride 3%  Inhalation 4 milliLiter(s) Inhalation every 6 hours    MEDICATIONS  (PRN):  acetaminophen   Oral Liquid .. 650 milliGRAM(s) Oral every 6 hours PRN Temp greater or equal to 38C (100.4F), Mild Pain (1 - 3)  dextrose Oral Gel 15 Gram(s) Oral once PRN Blood Glucose LESS THAN 70 milliGRAM(s)/deciliter      LABS:                        7.7    4.28  )-----------( 159      ( 17 Sep 2023 06:47 )             23.6     09-17    123<L>  |  88<L>  |  99<H>  ----------------------------<  205<H>  4.2   |  23  |  2.63<H>    Ca    8.4      17 Sep 2023 06:47  Phos  3.4     09-17  Mg     2.50     09-17        Urinalysis Basic - ( 17 Sep 2023 06:47 )    Color: x / Appearance: x / SG: x / pH: x  Gluc: 205 mg/dL / Ketone: x  / Bili: x / Urobili: x   Blood: x / Protein: x / Nitrite: x   Leuk Esterase: x / RBC: x / WBC x   Sq Epi: x / Non Sq Epi: x / Bacteria: x            PAST MEDICAL & SURGICAL HISTORY:  Breast CA      Diabetes      Stroke      Cardiac arrest      HTN (hypertension)      H/O mastectomy, bilateral          FAMILY HISTORY:  No pertinent family history in first degree relatives        Social History:      RADIOLOGY:  [ ] Reviewed and interpreted by me    PULMONARY FUNCTION TESTS:    EKG: CHIEF COMPLAINT: Patient is a 73y old  Female who presents with a chief complaint of Respiratory distress (17 Sep 2023 05:26)    Interval Events: No overnight events. No new nursing issues. VSS, and medications reviewed.     REVIEW OF SYSTEMS:  See above  [x] All other systems negative    Mode: AC/ CMV (Assist Control/ Continuous Mandatory Ventilation), RR (machine): 20, TV (machine): 300, FiO2: 30, PEEP: 8, ITime: 0.53, MAP: 15, PIP: 42    OBJECTIVE:  ICU Vital Signs Last 24 Hrs  T(C): 37.1 (17 Sep 2023 06:10), Max: 37.1 (16 Sep 2023 18:20)  T(F): 98.7 (17 Sep 2023 06:10), Max: 98.8 (16 Sep 2023 18:20)  HR: 94 (17 Sep 2023 06:53) (90 - 98)  BP: 111/69 (17 Sep 2023 06:10) (99/50 - 124/33)  BP(mean): --  ABP: --  ABP(mean): --  RR: 20 (17 Sep 2023 06:10) (20 - 20)  SpO2: 95% (17 Sep 2023 06:53) (92% - 99%)    O2 Parameters below as of 17 Sep 2023 06:10  Patient On (Oxygen Delivery Method): ventilator    O2 Concentration (%): 30      Mode: AC/ CMV (Assist Control/ Continuous Mandatory Ventilation), RR (machine): 20, TV (machine): 300, FiO2: 30, PEEP: 8, ITime: 0.53, MAP: 15, PIP: 42    09-16 @ 07:01  -  09-17 @ 07:00  --------------------------------------------------------  IN: 1418 mL / OUT: 700 mL / NET: 718 mL      CAPILLARY BLOOD GLUCOSE      POCT Blood Glucose.: 209 mg/dL (17 Sep 2023 05:16)      HOSPITAL MEDICATIONS:  MEDICATIONS  (STANDING):  albuterol/ipratropium for Nebulization 3 milliLiter(s) Nebulizer every 6 hours  atorvastatin 40 milliGRAM(s) Oral at bedtime  buMETAnide Injectable 2 milliGRAM(s) IV Push two times a day  chlorhexidine 0.12% Liquid 15 milliLiter(s) Oral Mucosa every 12 hours  chlorhexidine 2% Cloths 1 Application(s) Topical daily  dextrose 5%. 1000 milliLiter(s) (50 mL/Hr) IV Continuous <Continuous>  dextrose 5%. 1000 milliLiter(s) (100 mL/Hr) IV Continuous <Continuous>  dextrose 50% Injectable 12.5 Gram(s) IV Push once  dextrose 50% Injectable 25 Gram(s) IV Push once  dextrose 50% Injectable 25 Gram(s) IV Push once  doxazosin 6 milliGRAM(s) Oral <User Schedule>  epoetin odalis (EPOGEN) Injectable 17802 Unit(s) SubCutaneous <User Schedule>  ferrous    sulfate Liquid 300 milliGRAM(s) Enteral Tube daily  glucagon  Injectable 1 milliGRAM(s) IntraMuscular once  heparin  Infusion 1000 Unit(s)/Hr (9.5 mL/Hr) IV Continuous <Continuous>  insulin lispro (ADMELOG) corrective regimen sliding scale   SubCutaneous every 6 hours  insulin NPH human recombinant 3 Unit(s) SubCutaneous every 6 hours  meropenem  IVPB 500 milliGRAM(s) IV Intermittent every 12 hours  mupirocin 2% Ointment 1 Application(s) Both Nostrils two times a day  pantoprazole  Injectable 40 milliGRAM(s) IV Push two times a day  psyllium Powder 1 Packet(s) Oral daily  sodium chloride 2 Gram(s) Oral two times a day  sodium chloride 3%  Inhalation 4 milliLiter(s) Inhalation every 6 hours    MEDICATIONS  (PRN):  acetaminophen   Oral Liquid .. 650 milliGRAM(s) Oral every 6 hours PRN Temp greater or equal to 38C (100.4F), Mild Pain (1 - 3)  dextrose Oral Gel 15 Gram(s) Oral once PRN Blood Glucose LESS THAN 70 milliGRAM(s)/deciliter    PHYSICAL EXAM  GENERAL: Laying in bed comfortably, NAD  HEENT: +Trach, area c/d/i, +L ear purulent drainage noted  PULM: +Normal resp effort, + Rhonchi b/l, no w appreciated   HEART: +s1, s2  GI: +BS, soft, nt/nd, no peritoneal signs noted  MSK: No asymmetry, LIZZIE in upper extremities. RLE weakness- baseline. t  NEURO: Eyes opens spontaneously, follow simple commands    LABS:                        7.7    4.28  )-----------( 159      ( 17 Sep 2023 06:47 )             23.6     09-17    123<L>  |  88<L>  |  99<H>  ----------------------------<  205<H>  4.2   |  23  |  2.63<H>    Ca    8.4      17 Sep 2023 06:47  Phos  3.4     09-17  Mg     2.50     09-17    Urinalysis Basic - ( 17 Sep 2023 06:47 )    Color: x / Appearance: x / SG: x / pH: x  Gluc: 205 mg/dL / Ketone: x  / Bili: x / Urobili: x   Blood: x / Protein: x / Nitrite: x   Leuk Esterase: x / RBC: x / WBC x   Sq Epi: x / Non Sq Epi: x / Bacteria: x    PAST MEDICAL & SURGICAL HISTORY:  Breast CA    Diabetes    Stroke    Cardiac arrest    HTN (hypertension)    H/O mastectomy, bilateral    FAMILY HISTORY:  No pertinent family history in first degree relatives    Social History:    RADIOLOGY:  [ ] Reviewed and interpreted by me    PULMONARY FUNCTION TESTS:    EKG:

## 2023-09-17 NOTE — PROGRESS NOTE ADULT - ASSESSMENT
74 y/o F well known to me from my housecal outptn practice. she was admitted at [facility] 7/12-7/22 w aspiration PNA, was treated w CEFEPIME, developed an allergic rash,  dCHF, + MAC on AFB culture, had been progressively getting more and more lethargic and dyspneic at home since DC.   In  am of 8/11/23  ptn presented with respiratory distress w hypoxia and hypercarbia requiring intubation 2/2 volume overload +/- Asp PNA      Neuro   responds appropriately   Baseline MS AOx3, aphasic   - h/o CVA , on aspirin and statin . 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 Mcnulty, now in RCU  s/p septic shock overnight 9/1, now on meropenem, HDS  s/p trache, on vent support, copious secretions      Renal   Hyperkalemia with EKG changes  , initially treated w LOKELMA,  now resolved   Ptn well know to Dr. Colon, consult reviewed    HD 8/19,  8/21, 8/26 and 8/28.  s/p PUF 8/29 2.5 Liters, HD 8/30,  9/1, 9/4  started chronic HD 9/7,   cardura resumed  no HD today, on diuretics  on Hypertonic Saline for hyponatremia,  on BUMEX IVP      GI  NPO  on tube feeds  coffee ground emesis x 1 8/24, melena overnight 8/31, s/p 1UPRBC, EGD/Colonoscopy: erosive gastropathy, esophagisits, on colonoscopy old blood seen no active bleeding, poor prep  family to decide re PEG placement    Endocrine  T2DM   A1C 7.1 in 7/2023   - BG goal 140-200     ID   No fever/leukocytosis, recheck temp   Hx latent TB which was treated, no concern for TB   - grew MAC on AFB culture from most recent admission. at [facility]  -MSSA Bacteremia 9/1, allergic to cephalosprins and PCN, on Vanco as per ID, renal dosing,   - sputum also grew E.Coli-ESBL, as per ID recs for  Bradford through 9/7( 1 week course as per ID) , afebrile.  - 9/8:  spike  temp 38.1C, has O. externa, has a wick, recs are to get a CT to R/O an abscess/bony involvement. cont Meropenem  9/9: ptn w fever overnight to 100.8,  may need shiley pulled if bacteremic, needs NECK CT as per ENT to R/O Mastoid bone involvement while having ongoing purulent DC from L ear 2/2 O. externa, getting daily wick changes  9/10:  spiked 102 overnight through Bradford and Vanco, purulent DC from O. externa, ENT recommend Ct of mastoid. ptn in HD, has Shiley in place, consider pulling shiley and send for Cx. would d/w Renal, and consider contacting  ID, last seen by Dr. Conner 9/6 9/11: remains febrile, Dr. Conner reconsulted. cont Bradford, Vanco  9/12: tmax 100.5, fever curve is improving, awaiting Left ear CT, on Bradford since 9/1. on  vanco for MSSA Bacteremia, does not tolerate cephalosporins. through 10/2  9/13: on full vent support via trache, appears uncomfortable and onur 2/2 Left O. externa. has an incidental left middle ear tumor, no acute middle ear interventions recommended, left ear wick replaced. on Meropenem, cx from Ear DC is neg. On Vanco for MSSA bacteremia through 10/2, course of Meropenem as per ID, afebrile in the past 24 hrs . Cardura for HTN resumed, HGB dropped to 6.4, got a dose of EPO and 1UPRBC, rpt 7.8, remains on HEPARIN drip for LEFT popliteal DVT  9/14: cont on Mupirocin locally to Left ear, cont IV Bradford, ENT signed off, afebrile x 48 hrs, Mro course to be determined by ID, on Vanco for MSSA bacteremia through 10/2. ongoing worsening hyponatremia, Hypertonic saline as per renal, no HD today, s/p 1UPRBC 9/13  9/15: spiking temps again, if cont to spike as per ID re PAN scan. cont Vanco for MSSA Bacteremia  9/16-17: no temp overnight      Heme/Onc  ppx: place on SCD  Transfuse for hgb 6.4, no obv bleed. HDS  LEFT POPLITEAL VEIN ACUTE DVT on 9/10    Ethics  GOC - Discussed GOC with daughter and , they have opted in the past for full code. and she remains full code at present

## 2023-09-17 NOTE — PROGRESS NOTE ADULT - SUBJECTIVE AND OBJECTIVE BOX
Subjective: Patient seen and examined. No new events except as noted.   Daughter at bedside.  Remains in RCU.    REVIEW OF SYSTEMS:  Unable to obtain.    MEDICATIONS:  MEDICATIONS  (STANDING):  albuterol/ipratropium for Nebulization 3 milliLiter(s) Nebulizer every 6 hours  atorvastatin 40 milliGRAM(s) Oral at bedtime  chlorhexidine 0.12% Liquid 15 milliLiter(s) Oral Mucosa every 12 hours  chlorhexidine 2% Cloths 1 Application(s) Topical daily  ciprofloxacin/hydrocortisone Suspension Otic 4 Drop(s) Left Ear two times a day  dextrose 5%. 1000 milliLiter(s) (50 mL/Hr) IV Continuous <Continuous>  dextrose 5%. 1000 milliLiter(s) (100 mL/Hr) IV Continuous <Continuous>  dextrose 50% Injectable 12.5 Gram(s) IV Push once  dextrose 50% Injectable 25 Gram(s) IV Push once  dextrose 50% Injectable 25 Gram(s) IV Push once  doxazosin 6 milliGRAM(s) Oral <User Schedule>  epoetin odalis (EPOGEN) Injectable 77985 Unit(s) SubCutaneous <User Schedule>  ferrous    sulfate Liquid 300 milliGRAM(s) Enteral Tube daily  FIRST- Mouthwash  BLM 10 milliLiter(s) Swish and Spit four times a day  glucagon  Injectable 1 milliGRAM(s) IntraMuscular once  heparin  Infusion 1000 Unit(s)/Hr (9.5 mL/Hr) IV Continuous <Continuous>  insulin lispro (ADMELOG) corrective regimen sliding scale   SubCutaneous every 6 hours  insulin NPH human recombinant 3 Unit(s) SubCutaneous every 6 hours  meropenem  IVPB 500 milliGRAM(s) IV Intermittent every 12 hours  mupirocin 2% Ointment 1 Application(s) Topical two times a day  mupirocin 2% Ointment 1 Application(s) Both Nostrils two times a day  pantoprazole  Injectable 40 milliGRAM(s) IV Push two times a day  psyllium Powder 1 Packet(s) Oral daily  sevelamer carbonate Powder 800 milliGRAM(s) Oral three times a day  sodium chloride 2 Gram(s) Oral two times a day  sodium chloride 1.5%. 500 milliLiter(s) (50 mL/Hr) IV Continuous <Continuous>  sodium chloride 3%  Inhalation 4 milliLiter(s) Inhalation every 6 hours    PHYSICAL EXAM:  Vital Signs Last 24 Hrs  T(C): 37.1 (17 Sep 2023 01:25), Max: 37.4 (16 Sep 2023 07:37)  T(F): 98.8 (17 Sep 2023 01:25), Max: 99.3 (16 Sep 2023 07:37)  HR: 96 (17 Sep 2023 03:56) (90 - 98)  BP: 112/41 (17 Sep 2023 01:25) (99/50 - 124/33)  BP(mean): --  RR: 20 (17 Sep 2023 01:25) (20 - 20)  SpO2: 94% (17 Sep 2023 03:56) (92% - 99%)    Parameters below as of 17 Sep 2023 03:56  Patient On (Oxygen Delivery Method): ventilator    I&O's Summary    15 Sep 2023 07:01  -  16 Sep 2023 07:00  --------------------------------------------------------  IN: 954 mL / OUT: 1100 mL / NET: -146 mL    16 Sep 2023 07:01  -  17 Sep 2023 05:27  --------------------------------------------------------  IN: 884 mL / OUT: 300 mL / NET: 584 mL    Appearance: NAD, +trach  HEENT: dry oral mucosa  Lymphatic: No lymphadenopathy  Cardiovascular: Normal S1 S2, No JVD, No murmurs, No edema  Respiratory: Decreased BS, + trach to vent	  Neuro: opens eyes  Gastrointestinal: Soft, Non-tender, + BS, + NGT  Skin: No rashes, No ecchymoses, No cyanosis	  Extremities: No strength/ROM 2/2 sedation, + BL LE edema  Vascular: Peripheral pulses palpable 2+ bilaterally    LABS:    CARDIAC MARKERS:                        7.3    5.02  )-----------( 164      ( 16 Sep 2023 06:00 )             22.5     09-16    123<L>  |  89<L>  |  92<H>  ----------------------------<  222<H>  4.1   |  22  |  2.63<H>    Ca    8.1<L>      16 Sep 2023 06:00  Phos  3.2     09-16  Mg     2.40     09-16    proBNP:   Lipid Profile:   HgA1c:   TSH:     TELEMETRY: 	    ECG:  	  RADIOLOGY:   DIAGNOSTIC TESTING:  [ ] Echocardiogram:  [ ]  Catheterization:  [ ] Stress Test:    OTHER:

## 2023-09-17 NOTE — CHART NOTE - NSCHARTNOTEFT_GEN_A_CORE
[employer].    Notified of pts VS, BP 83/30 electronically. Manual BP 78/28. Pt currently without mIVF or PRN/Standing Midodrine. Per chart review, pt had SBP 79/60 earlier in the afternoon however no interventions ordered and only reassessed after few hours and BP returned closer to baseline. Midodrine 20mg PO x1 ordered for now, Rpt BP 1hr after administration. Will continue to monitor overnight.     Matilda Tripathi PA  Medicine [id number]

## 2023-09-18 NOTE — PROGRESS NOTE ADULT - ASSESSMENT
72 y/o F well known to me from my housecal outptn practice. she was admitted at [facility] 7/12-7/22 w aspiration PNA, was treated w CEFEPIME, developed an allergic rash,  dCHF, + MAC on AFB culture, had been progressively getting more and more lethargic and dyspneic at home since DC.   In  am of 8/11/23  ptn presented with respiratory distress w hypoxia and hypercarbia requiring intubation 2/2 volume overload +/- Asp PNA      Neuro   responds appropriately   Baseline MS AOx3, aphasic   - h/o CVA , on aspirin and statin . 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 Mcnulty, now in RCU  s/p septic shock overnight 9/1, now on meropenem, HDS  s/p trache, on vent support, copious secretions      Renal   Hyperkalemia with EKG changes  , initially treated w LOKELMA,  now resolved   Ptn well know to Dr. Cooln, consult reviewed    HD 8/19,  8/21, 8/26 and 8/28.  s/p PUF 8/29 2.5 Liters, HD 8/30,  9/1, 9/4  started chronic HD 9/7,   cardura resumed  no HD today, on diuretics  on Hypertonic Saline for hyponatremia,  on BUMEX IVP      GI  NPO  on tube feeds  coffee ground emesis x 1 8/24, melena overnight 8/31, s/p 1UPRBC, EGD/Colonoscopy: erosive gastropathy, esophagisits, on colonoscopy old blood seen no active bleeding, poor prep  family to decide re PEG placement    Endocrine  T2DM   A1C 7.1 in 7/2023   - BG goal 140-200     ID   No fever/leukocytosis, recheck temp   Hx latent TB which was treated, no concern for TB   - grew MAC on AFB culture from most recent admission. at [facility]  -MSSA Bacteremia 9/1, allergic to cephalosprins and PCN, on Vanco as per ID, renal dosing,   - sputum also grew E.Coli-ESBL, as per ID recs for  Bradford through 9/7( 1 week course as per ID) , afebrile.  - 9/8:  spike  temp 38.1C, has O. externa, has a wick, recs are to get a CT to R/O an abscess/bony involvement. cont Meropenem  9/9: ptn w fever overnight to 100.8,  may need shiley pulled if bacteremic, needs NECK CT as per ENT to R/O Mastoid bone involvement while having ongoing purulent DC from L ear 2/2 O. externa, getting daily wick changes  9/10:  spiked 102 overnight through Bradford and Vanco, purulent DC from O. externa, ENT recommend Ct of mastoid. ptn in HD, has Shiley in place, consider pulling shiley and send for Cx. would d/w Renal, and consider contacting  ID, last seen by Dr. Conner 9/6 9/11: remains febrile, Dr. Conner reconsulted. cont Bradford, Vanco  9/12: tmax 100.5, fever curve is improving, awaiting Left ear CT, on Bradford since 9/1. on  vanco for MSSA Bacteremia, does not tolerate cephalosporins. through 10/2  9/13: on full vent support via trache, appears uncomfortable and onur 2/2 Left O. externa. has an incidental left middle ear tumor, no acute middle ear interventions recommended, left ear wick replaced. on Meropenem, cx from Ear DC is neg. On Vanco for MSSA bacteremia through 10/2, course of Meropenem as per ID, afebrile in the past 24 hrs . Cardura for HTN resumed, HGB dropped to 6.4, got a dose of EPO and 1UPRBC, rpt 7.8, remains on HEPARIN drip for LEFT popliteal DVT  9/14: cont on Mupirocin locally to Left ear, cont IV Bradford, ENT signed off, afebrile x 48 hrs, Mro course to be determined by ID, on Vanco for MSSA bacteremia through 10/2. ongoing worsening hyponatremia, Hypertonic saline as per renal, no HD today, s/p 1UPRBC 9/13  9/15: spiking temps again, if cont to spike as per ID re PAN scan. cont Vanco for MSSA Bacteremia  9/16-18: no temp overnight      Heme/Onc  ppx: place on SCD  Transfuse for hgb 6.4, no obv bleed. HDS  LEFT POPLITEAL VEIN ACUTE DVT on 9/10    Ethics  GOC - Discussed GOC with daughter and , they have opted in the past for full code. and she remains full code at present

## 2023-09-18 NOTE — PROGRESS NOTE ADULT - SUBJECTIVE AND OBJECTIVE BOX
Patient is a 73y old  Female who presents with a chief complaint of Respiratory distress (18 Sep 2023 17:19)      SUBJECTIVE / OVERNIGHT EVENTS: ptn is not responsive to verbal stimuli, trache to vent, afebrile, on Vanco    MEDICATIONS  (STANDING):  albuterol/ipratropium for Nebulization 3 milliLiter(s) Nebulizer every 6 hours  atorvastatin 40 milliGRAM(s) Oral at bedtime  buMETAnide Injectable 2 milliGRAM(s) IV Push two times a day  chlorhexidine 0.12% Liquid 15 milliLiter(s) Oral Mucosa every 12 hours  chlorhexidine 2% Cloths 1 Application(s) Topical daily  dextrose 5%. 1000 milliLiter(s) (50 mL/Hr) IV Continuous <Continuous>  dextrose 5%. 1000 milliLiter(s) (100 mL/Hr) IV Continuous <Continuous>  dextrose 50% Injectable 12.5 Gram(s) IV Push once  dextrose 50% Injectable 25 Gram(s) IV Push once  dextrose 50% Injectable 25 Gram(s) IV Push once  doxazosin 6 milliGRAM(s) Oral <User Schedule>  epoetin odalis (EPOGEN) Injectable 18394 Unit(s) SubCutaneous <User Schedule>  ferrous    sulfate Liquid 300 milliGRAM(s) Enteral Tube daily  glucagon  Injectable 1 milliGRAM(s) IntraMuscular once  heparin  Infusion 1000 Unit(s)/Hr (9.5 mL/Hr) IV Continuous <Continuous>  insulin lispro (ADMELOG) corrective regimen sliding scale   SubCutaneous every 6 hours  insulin NPH human recombinant 3 Unit(s) SubCutaneous every 6 hours  meropenem  IVPB 500 milliGRAM(s) IV Intermittent every 12 hours  mupirocin 2% Ointment 1 Application(s) Both Nostrils two times a day  pantoprazole  Injectable 40 milliGRAM(s) IV Push two times a day  psyllium Powder 1 Packet(s) Oral daily  sodium chloride 2 Gram(s) Oral two times a day  sodium chloride 1.5%. 500 milliLiter(s) (50 mL/Hr) IV Continuous <Continuous>  sodium chloride 3%  Inhalation 4 milliLiter(s) Inhalation every 6 hours    MEDICATIONS  (PRN):  acetaminophen   Oral Liquid .. 650 milliGRAM(s) Oral every 6 hours PRN Temp greater or equal to 38C (100.4F), Mild Pain (1 - 3)  dextrose Oral Gel 15 Gram(s) Oral once PRN Blood Glucose LESS THAN 70 milliGRAM(s)/deciliter      Vital Signs Last 24 Hrs  T(F): 98.4 (09-18-23 @ 18:09), Max: 98.6 (09-17-23 @ 18:42)  HR: 96 (09-18-23 @ 18:09) (78 - 104)  BP: 151/75 (09-18-23 @ 18:09) (83/30 - 151/75)  RR: 20 (09-18-23 @ 18:09) (20 - 21)  SpO2: 94% (09-18-23 @ 18:09) (94% - 100%)  Telemetry:   CAPILLARY BLOOD GLUCOSE      POCT Blood Glucose.: 229 mg/dL (18 Sep 2023 17:16)  POCT Blood Glucose.: 228 mg/dL (18 Sep 2023 11:09)  POCT Blood Glucose.: 245 mg/dL (18 Sep 2023 05:04)  POCT Blood Glucose.: 191 mg/dL (17 Sep 2023 23:45)    I&O's Summary    17 Sep 2023 07:01  -  18 Sep 2023 07:00  --------------------------------------------------------  IN: 1808 mL / OUT: 700 mL / NET: 1108 mL        PHYSICAL EXAM:  GENERAL: NAD, well-developed  HEAD:  Atraumatic, Normocephalic  EYES: EOMI, PERRLA, conjunctiva and sclera clear  NECK: Supple, No JVD  CHEST/LUNG: Clear to auscultation bilaterally; No wheeze  HEART: Regular rate and rhythm; No murmurs, rubs, or gallops  ABDOMEN: Soft, Nontender, Nondistended; Bowel sounds present  EXTREMITIES:  2+ Peripheral Pulses, No clubbing, cyanosis, or edema  PSYCH: AAOx3  NEUROLOGY: non-focal  SKIN: No rashes or lesions    LABS:                        8.9    5.10  )-----------( 180      ( 18 Sep 2023 06:15 )             27.6     09-18    125<L>  |  87<L>  |  103<H>  ----------------------------<  224<H>  4.4   |  22  |  2.62<H>    Ca    8.6      18 Sep 2023 06:15  Phos  3.9     09-18  Mg     2.50     09-18      PTT - ( 18 Sep 2023 06:15 )  PTT:85.9 sec      Urinalysis Basic - ( 18 Sep 2023 06:15 )    Color: x / Appearance: x / SG: x / pH: x  Gluc: 224 mg/dL / Ketone: x  / Bili: x / Urobili: x   Blood: x / Protein: x / Nitrite: x   Leuk Esterase: x / RBC: x / WBC x   Sq Epi: x / Non Sq Epi: x / Bacteria: x        RADIOLOGY & ADDITIONAL TESTS:    Imaging Personally Reviewed:    Consultant(s) Notes Reviewed:      Care Discussed with Consultants/Other Providers:

## 2023-09-18 NOTE — PROGRESS NOTE ADULT - SUBJECTIVE AND OBJECTIVE BOX
Follow Up:  MSSA bacteremia    Interval History: no more fever but pt had some urinary retention s/p straight cath and also had hypotension s/p midodrine    ROS:    Unobtainable because: trached        Allergies  isoniazid (Rash)  nafcillin (Unknown)  hydrALAZINE (Rash)  vitamin E (Short breath; Urticaria; Hives)  doxycycline (Rash)  cefepime (Rash)  NIFEdipine (Urticaria; Hives)        ANTIMICROBIALS:  meropenem  IVPB 500 every 12 hours      OTHER MEDS:  acetaminophen   Oral Liquid .. 650 milliGRAM(s) Oral every 6 hours PRN  albuterol/ipratropium for Nebulization 3 milliLiter(s) Nebulizer every 6 hours  atorvastatin 40 milliGRAM(s) Oral at bedtime  buMETAnide Injectable 2 milliGRAM(s) IV Push two times a day  chlorhexidine 0.12% Liquid 15 milliLiter(s) Oral Mucosa every 12 hours  chlorhexidine 2% Cloths 1 Application(s) Topical daily  dextrose 5%. 1000 milliLiter(s) IV Continuous <Continuous>  dextrose 5%. 1000 milliLiter(s) IV Continuous <Continuous>  dextrose 50% Injectable 12.5 Gram(s) IV Push once  dextrose 50% Injectable 25 Gram(s) IV Push once  dextrose 50% Injectable 25 Gram(s) IV Push once  dextrose Oral Gel 15 Gram(s) Oral once PRN  doxazosin 6 milliGRAM(s) Oral <User Schedule>  epoetin odalis (EPOGEN) Injectable 32631 Unit(s) SubCutaneous <User Schedule>  ferrous    sulfate Liquid 300 milliGRAM(s) Enteral Tube daily  glucagon  Injectable 1 milliGRAM(s) IntraMuscular once  heparin  Infusion 1000 Unit(s)/Hr IV Continuous <Continuous>  insulin lispro (ADMELOG) corrective regimen sliding scale   SubCutaneous every 6 hours  insulin NPH human recombinant 3 Unit(s) SubCutaneous every 6 hours  mupirocin 2% Ointment 1 Application(s) Both Nostrils two times a day  pantoprazole  Injectable 40 milliGRAM(s) IV Push two times a day  psyllium Powder 1 Packet(s) Oral daily  sodium chloride 2 Gram(s) Oral two times a day  sodium chloride 1.5%. 500 milliLiter(s) IV Continuous <Continuous>  sodium chloride 3%  Inhalation 4 milliLiter(s) Inhalation every 6 hours      Vital Signs Last 24 Hrs  T(C): 36.8 (18 Sep 2023 14:00), Max: 37 (17 Sep 2023 18:42)  T(F): 98.2 (18 Sep 2023 14:00), Max: 98.6 (17 Sep 2023 18:42)  HR: 98 (18 Sep 2023 15:33) (78 - 104)  BP: 101/80 (18 Sep 2023 14:00) (83/30 - 121/43)  BP(mean): 58 (18 Sep 2023 05:30) (39 - 61)  RR: 20 (18 Sep 2023 14:00) (20 - 21)  SpO2: 95% (18 Sep 2023 15:33) (94% - 100%)    Parameters below as of 18 Sep 2023 15:33  Patient On (Oxygen Delivery Method): ventilator        Physical Exam:  General:    trached   ENT: L with improved edema  Respiratory:   trach on vent  abd:   soft, not tender  :     no CVAT, no suprapubic tenderness, no willard  Musculoskeletal : no joint swelling  Skin:    no rash now  vascular: SHAKIRA odom                        8.9    5.10  )-----------( 180      ( 18 Sep 2023 06:15 )             27.6       09-18    125<L>  |  87<L>  |  103<H>  ----------------------------<  224<H>  4.4   |  22  |  2.62<H>    Ca    8.6      18 Sep 2023 06:15  Phos  3.9     09-18  Mg     2.50     09-18        Urinalysis Basic - ( 18 Sep 2023 06:15 )    Color: x / Appearance: x / SG: x / pH: x  Gluc: 224 mg/dL / Ketone: x  / Bili: x / Urobili: x   Blood: x / Protein: x / Nitrite: x   Leuk Esterase: x / RBC: x / WBC x   Sq Epi: x / Non Sq Epi: x / Bacteria: x        MICROBIOLOGY:  Vancomycin Level, Random: 20.7 ug/mL (09-18-23 @ 06:15)  v  Ear Ear  09-13-23   Moderate Candida albicans "Susceptibilities not performed"  --  --      .Blood Blood-Venous  09-10-23   No growth at 5 days  --  --      .Sputum Sputum  09-10-23   Normal Respiratory Cate present  --    Rare polymorphonuclear leukocytes per low power field  No Squamous epithelial cells per low power field  Rare Yeast like cells seen per oil power field  Rare Gram Positive Cocci in Clusters seen per oil power field      .Blood Blood-Peripheral  09-10-23   No growth at 5 days  --  --      .Sputum Sputum  09-08-23   Normal Respiratory Cate present  --    Numerous polymorphonuclear leukocytes per low power field  Numerous Squamous epithelial cells per low power field  Few Yeast like cells per oil power field  Results consistent with oropharyngeal contamination      .Blood Blood-Peripheral  09-08-23   No growth at 5 days  --  --      Ear Ear  09-06-23   No growth at 5 days  --  --      .Blood Blood-Peripheral  09-04-23   No growth at 5 days  --  --      .Bronchial Bronchial Lavage  09-01-23   Numerous Staphylococcus aureus Multiple Morphological Strains  Normal Respiratory Cate present  --  Staphylococcus aureus  Staphylococcus aureus      .Blood Blood-Peripheral  09-01-23   Growth in aerobic and anaerobic bottles: Staphylococcus aureus  Direct identification is available within approximately 3-5  hours either by Blood Panel Multiplexed PCR or Direct  MALDI-TOF. Details: [email or web address]  Growth in anaerobic bottle: blood culture bottle turned positive after  the final report was released.  --  Blood Culture PCR  Staphylococcus aureus      ET Tube ET Tube  09-01-23   Moderate Staphylococcus aureus  Moderate Escherichia coli ESBL  Normal Respiratory Cate present  --  Staphylococcus aureus  Escherichia coli ESBL                RADIOLOGY:  Images independently visualized and reviewed personally, findings as below  < from: CT Internal Auditory Canals No Cont (09.12.23 @ 17:13) >  IMPRESSION:    1. Acute on chronic left-sided otitis externa and acute on chronic   left-sided otomastoiditis. 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 Superimposed cholesteatoma formation within the left   tympanomastoid cavity cannot be excluded. No evidence of malignant otitis   externa.    2. Chronic right-sided mastoiditis.    < end of copied text >  < from: Transthoracic Echocardiogram (09.06.23 @ 14:05) >  CONCLUSIONS:  Limited study to evaluate for endocarditis.  Unable to  exclude endocarditis.  Consider STEPHANIE for further evaluation,  if clinically indicated.    < end of copied text >

## 2023-09-18 NOTE — PROGRESS NOTE ADULT - PROBLEM SELECTOR PLAN 1
Multifactorial     - Aspiration, acute on chronic diastolic CHF   IV abx - restarted  s/p trach 9/1  ENT consulted for Left otitis externa and otomastoiditis    - c/w meds as ordered

## 2023-09-18 NOTE — PROGRESS NOTE ADULT - ASSESSMENT
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 While in MICU patient noted with worsening renal failure requiring HD initiation, CGE/ Melena s/p EGD 8/31 found with esophagitis and erosive gastropathy, new right cerebellar infarct and fevers second to ESBL COLI and MSSA VAP, MSSA bacteremia, left ear otitis externa and drug rxn to cephalosporins Course further complicated by prolonged vent time s/p tracheostomy 9/1 and transferred to RCU 9/3 completed by recurrent fevers with high PIP, respiratory acidosis and concern for volume overloaded.     NEUROLOGY  # Metabolic Encephalopath vs NEW cerebella infarct   - Baseline MS AOX3 with short term memory changes per family however noted with concern for poor mentation in ICU  - MRI (8/17) with NEW right cerebellar infarct and old left PCA/occipital infarcts  - STEPHANIE (8/17) with AV showing two linear mobile echogenic elements attached to valve leaflets consistent with Lambel's excrescence, but no TRINITY thrombus, and lambel's excrescence with uncertain clinical implication.   - EEG (8/11-8/15) with no seizures   - ILR interrogation (9/7) with SR and PACs falsely recorded as AF.   - Attempted to resume ASA for medical management but held given GIB   - Continue on Lipitor for medical management   - Course complicated by questionable head and body shaking 9/8 however prolactin elevated and shakes head yes/no to some questions.   - Lethargy more likely second to respiratory acidosis and will hold on RPT EEG    - Monitor mentation closely Awakens to verbal stimuli     CARDIOVASCULAR  # HTN Urgency   # Septic vs vasoplegic shock   - Labile BPs ranging in between SBP 80s-200s and attempted labetalol, however noted with hypotension and bradycardia likely from BB toxicity vs shock state?    - Holding Labetalol and Catapres   - c/w Cardura for now     # Hx of HFpEF with likely ADHF with volume overload.   - ECHO (7/2023) with EF 59 with severe LVH and diastolic dysfunction   - STEPHANIE (8/18) with normal LVRVSF however noted with increased LA pressures and persistent LA septal bowing into RA.   - ECHO (8/11) with EF 60 with mild LVH, normal LVRVSF, and mild ddfxn.  - Remove volume with HD sessions and intermittent bumex pushes as BP allows    - c/w Bumex 2mg IV BID for now - Monitor UOP and electrolytes     HEENT   # Left ear OE   - ENT evaluation (9/7) with no mastoid tenderness, however found with positive swelling and excoriation to left auricle and tenderness to manipulation of auricle. Debrided crusting of auricle and EAC evaluated with edema and unable to visualize TM. EAC debrided and found with watery exudate.   - Continue on CiproHC (9/5 - 9/16)   - Continue on Bradford (9/1 - ) as below   - ENT following and ear wick change QD   - Wick out but needs ? debridement wound care called/fu ent   - ENT recommending CT IAC w/ IV con but family is refusing as concerned given CKD, kidneys wouldn't recover from IV contrast. Spoke with Nephrology, ENT, and patient's  at length. Planned for CT non con and per , decision will be made from there but for now doesn't want to risk further harming kidneys.  - CT IAC showing acute on chronic left-sided otitis externa and acute on chronic left-sided otomastoiditis. Superimposed left-sided tympanosclerosis. Superimposed cholesteatoma formation within the left   tympanomastoid cavity cannot be excluded. No evidence of malignant otitis externa.    # Oral Lesions with questionable Zoster vs Trauma?   - Patient with tongue pain and seen with anterior ulcerations consolidating and crusting and posterior ulceration.  - Case discussed with pathology resident and culture/ cytology sent.   - HSV negative and Path (9/6) with normal appearing epithelial cells singly and in clusters devoid of viral cytopathic effect.   - Continue on magic mouth wash for pain    RESPIRATORY  # AHHRF second to volume overload   - Hx of CKD and HFpEF presented with SOB and hypercarbia second to volume overload requiring intubation and HD initiation   - Vent weaning attempted however limited requiring tracheostomy (9/1) with IP   - Course complicated by PIPs elevated (50s) and respiratory acidosis second to secretions vs volume ?    - Vent adjusted 20/300/5/30 without improvement.   - POCUS (9/8) with BL pleural effusions (large on right and small on left)   - CT CHEST (9/8) with TBM, BL pleural effusions and continued consolidations   - Continue on vent and given TBM increased PEEP (9/9) to 20/300/8/40 with overall improvement   - Continue on Duoneb and HTS for airway clearance   - Continue volume removal with diuresis and aggressive UF sessions.   - Discussing possible thora but appears improved and will monitor.  ]  - Bedside US showing decrease in pleural effusion - hold off thoracentesis 9/10     GI  # UGIB   - CGE x 1 episode on 8/24 and melena x 2 episodes on 8/31 likely residual blood.   - EGD (8/31) found with esophagitis and erosive gastropathy  - Continue on PPI BID through late October and then PPI QD   - No melena     # Dysphagia   - NGT-TF continued   - Pending PEG however needs improvement in infectious status prior to placement.     RENAL  # Progressive CKD   - Presented volume overload and progressive RUSS on CKD (baseline CRE in 2s and mode into the 3s on admission) likely obstruction vs low flow state with shock vs AIN from drug reaction issue?  - POCUS with no signs of hydronephrosis   - Pressor support started with improvement in renal function  - Attempted steroid course in ICU without improvement in renal function   - Case discussed at length with renal and initiated on HD in ICU and now continued on HD TTHS, however remains volume overloaded.   - Patient oliguric however responds to Bumex pushes   - Continue on aggressive UF sessions with HD TTHS and bumex pushes as BP allows   - Monitor renal function and UOP     Hyponatremia  - c/w Salt tabs - s/p Hypertonic 1.5%NS @ 50cc/hr x 5 hr  - Renal- holding off on HD for now      # Hyperphosphatemia (resolved)   - PTH normal and elevated phos likely from renal failure  - s/p Phos binder with Renvela - now d'beth as level wnl      INFECTIOUS DISEASE  # ESBL ECOLI and MSSA VAP c/b MSSA bacteremia   - RVP/ COVID (8/11) negative   - SCx (8/11) with MSSA s/p cefepime (8/12-8/13) and then ancef (8/14) however noted with drug reaction and then changed to vanco and aztreonam (8/12-8/13) in ICU .   - Course complicated by recurrent fevers and return of MSSA with bacteremia.   - SCx (9/1) with MSSA and ESBL ECOLI   - BAL (9/1) with MSSA   - BCx (9/1) with MSSA and cleared on (9/4)   - ECHO (9/6) unable to rule out endocarditis   - Continue on Bradford (9/4 - ) and Vanco BY DOSE POST HD (9/4, 9/7, 9/9 ...) with duration TBD at this time.   - Given inability to rule out endocarditis will discuss possible 4 wks?   - Course complicated by fever (9/7) and UA with leuks but no bacteria, however compared to prior improved, RVP/COVID and SCx negative, and RPT CXR similar to prior   - Pending BCx, SCx, UCx, and LE DOPPLERS- Blood and sputum cx NTD; Acute left deep vein thrombosis of the left popliteal vein.  - Febrile overnight 102 recultured and sent off   -Pt receiving vanco post HD however today given vanco 750mg x 1 will check Vanco level at end of HD session and dose   -     # Left ear OE   - ENT evaluation (9/7) with no mastoid tenderness, however found with positive swelling and excoriation to left auricle and tenderness to manipulation of auricle. Debrided crusting of auricle and EAC evaluated with edema and unable to visualize TM. EAC debrided and found with watery exudate.   - Continue on CiproHC (9/5 - )   - Continue on Bradford (9/1 - ) as below     # MRSA PCRs   - MRSA PCR (8/11 and 8/14) negative   - Next MRSA + PCR ordered for 10/8     HEME  # Anemia second to GIB vs renal disease   - Hold chemical DVT PPX given bleeding/ waxing and waning HH   - s/p multiple units of PRBCs (8/15, 8/21, 8/28, 8/31 and 9/8)   - Anemia panel with AOCD but with low %sat and ferrous sulfate added to optimize   - Monitor HH and discuss with renal for EPO sometime next week.   - SPoke with GI for clearance to start Heparin gtt for + DVT     Vascular  # DVT  - Pt with + popliteal DVT on SCD Spoke with GI no absolute contraindication for starting Heparin - advise close monitoring for bleeding - Do stat CTA if bleeding occurs and call IR   - Pt specific heparin gtt started with goal PTT 60-85  - will monitor closely     ONC   # Hx of Breast CA   - Patient dx in 2018 and s/p BL mastectomy (radical on right) and chemo and RT.   - Supportive care.     ENDOCRINE  # IDDM2   - Continue on NPH 3U and ISS   - Monitor FS and adjust as needed     LINES/ TUBES  - R ARTHUR TAYLOR (8/19 - )     SKIN  # Drug Eruption   - Attempted cefepime (8/12) vs ancef (8/13-8/14) and then noted with drug rxn.   - Hold further cephalosporins at this time   - s/p Steroids with prednisone 40 (8/18 - 9/2) then prednisone 30 (9/3-9/7), then prednisone 20 (9/8 - 9/10), then prednisone 10mg (9/11-9/13) then turn off.     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 While in MICU patient noted with worsening renal failure requiring HD initiation, CGE/ Melena s/p EGD 8/31 found with esophagitis and erosive gastropathy, new right cerebellar infarct and fevers second to ESBL COLI and MSSA VAP, MSSA bacteremia, left ear otitis externa and drug rxn to cephalosporins Course further complicated by prolonged vent time s/p tracheostomy 9/1 and transferred to RCU 9/3 completed by recurrent fevers with high PIP, respiratory acidosis and concern for volume overloaded.     NEUROLOGY  # Metabolic Encephalopath vs NEW cerebella infarct   - Baseline MS AOX3 with short term memory changes per family however noted with concern for poor mentation in ICU  - MRI (8/17) with NEW right cerebellar infarct and old left PCA/occipital infarcts  - STEPHANIE (8/17) with AV showing two linear mobile echogenic elements attached to valve leaflets consistent with Lambel's excrescence, but no TRINITY thrombus, and lambel's excrescence with uncertain clinical implication.   - EEG (8/11-8/15) with no seizures   - ILR interrogation (9/7) with SR and PACs falsely recorded as AF.   - Attempted to resume ASA for medical management but held given GIB   - Continue on Lipitor for medical management   - Course complicated by questionable head and body shaking 9/8 however prolactin elevated and shakes head yes/no to some questions.   - Lethargy more likely second to respiratory acidosis and will hold on RPT EEG    - Monitor mentation closely Awakens to verbal stimuli     CARDIOVASCULAR  # HTN Urgency   # Septic vs vasoplegic shock   - Labile BPs ranging in between SBP 80s-200s and attempted labetalol, however noted with hypotension and bradycardia likely from BB toxicity vs shock state?    - Holding Labetalol and Catapres   - c/w Cardura for now     # Hx of HFpEF with likely ADHF with volume overload.   - ECHO (7/2023) with EF 59 with severe LVH and diastolic dysfunction   - STEPHANIE (8/18) with normal LVRVSF however noted with increased LA pressures and persistent LA septal bowing into RA.   - ECHO (8/11) with EF 60 with mild LVH, normal LVRVSF, and mild ddfxn.  - Remove volume with HD sessions and intermittent bumex pushes as BP allows    - c/w Bumex 2mg IV BID for now - Monitor UOP and electrolytes     HEENT   # Left ear OE   - ENT evaluation (9/7) with no mastoid tenderness, however found with positive swelling and excoriation to left auricle and tenderness to manipulation of auricle. Debrided crusting of auricle and EAC evaluated with edema and unable to visualize TM. EAC debrided and found with watery exudate.   - Continue on CiproHC (9/5 - 9/16)   - Continue on Bradford (9/1 - ) as below   - ENT following and ear wick change QD   - Wick out but needs ? Debridement wound care called/fu ent   - ENT recommending CT IAC w/ IV con but family is refusing as concerned given CKD, kidneys wouldn't recover from IV contrast. Spoke with Nephrology, ENT, and patient's  at length. Planned for CT non con and per , decision will be made from there but for now doesn't want to risk further harming kidneys.  - CT IAC showing acute on chronic left-sided otitis externa and acute on chronic left-sided otomastoiditis. Superimposed left-sided tympanosclerosis. Superimposed cholesteatoma formation within the left   tympanomastoid cavity cannot be excluded. No evidence of malignant otitis externa.    # Oral Lesions with questionable Zoster vs Trauma?   - Patient with tongue pain and seen with anterior ulcerations consolidating and crusting and posterior ulceration.  - Case discussed with pathology resident and culture/ cytology sent.   - HSV negative and Path (9/6) with normal appearing epithelial cells singly and in clusters devoid of viral cytopathic effect.   - Continue on magic mouth wash for pain    RESPIRATORY  # AHHRF second to volume overload   - Hx of CKD and HFpEF presented with SOB and hypercarbia second to volume overload requiring intubation and HD initiation   - Vent weaning attempted however limited requiring tracheostomy (9/1) with IP   - Course complicated by PIPs elevated (50s) and respiratory acidosis second to secretions vs volume ?    - Vent adjusted 20/300/5/30 without improvement.   - POCUS (9/8) with BL pleural effusions (large on right and small on left)   - CT CHEST (9/8) with TBM, BL pleural effusions and continued consolidations   - Continue on vent and given TBM increased PEEP (9/9) to 20/300/8/40 with overall improvement   - Continue on Duoneb and HTS for airway clearance   - Continue volume removal with diuresis and aggressive UF sessions.   - Discussing possible thora but appears improved and will monitor.  ]  - Bedside US showing decrease in pleural effusion - hold off thoracentesis 9/10     GI  # UGIB   - CGE x 1 episode on 8/24 and melena x 2 episodes on 8/31 likely residual blood.   - EGD (8/31) found with esophagitis and erosive gastropathy  - Continue on PPI BID through late October and then PPI QD   - No melena     # Dysphagia   - NGT-TF continued   - Pending PEG however needs improvement in infectious status prior to placement.     RENAL  # Progressive CKD   - Presented volume overload and progressive RUSS on CKD (baseline CRE in 2s and mode into the 3s on admission) likely obstruction vs low flow state with shock vs AIN from drug reaction issue?  - POCUS with no signs of hydronephrosis   - Pressor support started with improvement in renal function  - Attempted steroid course in ICU without improvement in renal function   - Case discussed at length with renal and initiated on HD in ICU and now continued on HD TTHS, however remains volume overloaded.   - Patient oliguric however responds to Bumex pushes   - Nephro following - will continue to hold off on HD at this time  - Monitor renal function and UOP, and electrolytes for now    Hyponatremia (improving) Na 123>>125  - c/w Salt tabs - s/p Hypertonic 1.5%NS @ 50cc/hr x 5 hr  - Renal- holding off on HD for now      # Hyperphosphatemia (resolved)   - PTH normal and elevated phos likely from renal failure  - s/p Phos binder with Renvela - now d'beth as level wnl      INFECTIOUS DISEASE  # ESBL ECOLI and MSSA VAP c/b MSSA bacteremia   - RVP/ COVID (8/11) negative   - SCx (8/11) with MSSA s/p cefepime (8/12-8/13) and then ancef (8/14) however noted with drug reaction and then changed to vanco and aztreonam (8/12-8/13) in ICU .   - Course complicated by recurrent fevers and return of MSSA with bacteremia.   - SCx (9/1) with MSSA and ESBL ECOLI   - BAL (9/1) with MSSA   - BCx (9/1) with MSSA and cleared on (9/4)   - ECHO (9/6) unable to rule out endocarditis   - Continue on Bradford (9/4 - ) and Vanco BY DOSE POST HD (9/4, 9/7, 9/9 ...) with duration TBD at this time.   - Given inability to rule out endocarditis will discuss possible 4 wks with ID?   - Course complicated by fever (9/7) and UA with leuks but no bacteria, however compared to prior improved, RVP/COVID and SCx negative, and RPT CXR similar to prior   - Pending BCx, SCx, UCx, and LE DOPPLERS- Blood and sputum cx NTD; Acute left deep vein thrombosis of the left popliteal vein.  - Febrile overnight 102 recultured and sent off   -Pt receiving vanco post HD however today given vanco 750mg x 1 will check Vanco level at end of HD session and dose     # Left ear OE   - ENT evaluation (9/7) with no mastoid tenderness, however found with positive swelling and excoriation to left auricle and tenderness to manipulation of auricle. Debrided crusting of auricle and EAC evaluated with edema and unable to visualize TM. EAC debrided and found with watery exudate.   - Continue on CiproHC (9/5 - )   - Continue on Bradford (9/1 - ) as below     # MRSA PCRs   - MRSA PCR (8/11 and 8/14) negative   - Next MRSA + PCR ordered for 10/8     HEME  # Anemia second to GIB vs renal disease   - Hold chemical DVT PPX given bleeding/ waxing and waning HH   - s/p multiple units of PRBCs (8/15, 8/21, 8/28, 8/31 and 9/8)   - Anemia panel with AOCD but with low %sat and ferrous sulfate added to optimize   - Monitor HH and discuss with renal for EPO sometime next week.   - Spoke with GI for clearance to start Heparin gtt for + DVT     Vascular  # DVT  - Pt with + popliteal DVT on SCD Spoke with GI no absolute contraindication for starting Heparin - advise close monitoring for bleeding - Do stat CTA if bleeding occurs and call IR   - Pt specific heparin gtt started with goal PTT 60-85  - will monitor closely     ONC   # Hx of Breast CA   - Patient dx in 2018 and s/p BL mastectomy (radical on right) and chemo and RT.   - Supportive care.     ENDOCRINE  # IDDM2   - Continue on NPH 3U and ISS   - Monitor FS and adjust as needed     LINES/ TUBES  - ANDREIA TAYLOR (8/19 - )     SKIN  # Drug Eruption   - Attempted cefepime (8/12) vs ancef (8/13-8/14) and then noted with drug rxn.   - Hold further cephalosporins at this time   - s/p Steroids with prednisone 40 (8/18 - 9/2) then prednisone 30 (9/3-9/7), then prednisone 20 (9/8 - 9/10), then prednisone 10mg (9/11-9/13) then turn off.     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 While in MICU patient noted with worsening renal failure requiring HD initiation, CGE/ Melena s/p EGD 8/31 found with esophagitis and erosive gastropathy, new right cerebellar infarct and fevers second to ESBL COLI and MSSA VAP, MSSA bacteremia, left ear otitis externa and drug rxn to cephalosporins Course further complicated by prolonged vent time s/p tracheostomy 9/1 and transferred to RCU 9/3 completed by recurrent fevers with high PIP, respiratory acidosis and concern for volume overloaded.     NEUROLOGY  # Metabolic Encephalopath vs NEW cerebella infarct   - Baseline MS AOX3 with short term memory changes per family however noted with concern for poor mentation in ICU  - MRI (8/17) with NEW right cerebellar infarct and old left PCA/occipital infarcts  - STEPHANIE (8/17) with AV showing two linear mobile echogenic elements attached to valve leaflets consistent with Lambel's excrescence, but no TRINITY thrombus, and lambel's excrescence with uncertain clinical implication.   - EEG (8/11-8/15) with no seizures   - ILR interrogation (9/7) with SR and PACs falsely recorded as AF.   - Attempted to resume ASA for medical management but held given GIB   - Continue on Lipitor for medical management   - Course complicated by questionable head and body shaking 9/8 however prolactin elevated and shakes head yes/no to some questions.   - Lethargy more likely second to respiratory acidosis and will hold on RPT EEG    - Monitor mentation closely Awakens to verbal stimuli     CARDIOVASCULAR  # HTN Urgency   # Septic vs vasoplegic shock   - Labile BPs ranging in between SBP 80s-200s and attempted labetalol, however noted with hypotension and bradycardia likely from BB toxicity vs shock state?    - Holding Labetalol and Catapres   - c/w Cardura for now   - Hypotensive in the 80s systolic overnight, requiring Midodrine 20mg x 1    # Hx of HFpEF with likely ADHF with volume overload.   - ECHO (7/2023) with EF 59 with severe LVH and diastolic dysfunction   - STEPHANIE (8/18) with normal LVRVSF however noted with increased LA pressures and persistent LA septal bowing into RA.   - ECHO (8/11) with EF 60 with mild LVH, normal LVRVSF, and mild ddfxn.  - Remove volume with HD sessions and intermittent bumex pushes as BP allows    - c/w Bumex 2mg IV BID for now - Monitor UOP and electrolytes     HEENT   # Left ear OE   - ENT evaluation (9/7) with no mastoid tenderness, however found with positive swelling and excoriation to left auricle and tenderness to manipulation of auricle. Debrided crusting of auricle and EAC evaluated with edema and unable to visualize TM. EAC debrided and found with watery exudate.   - Continue on CiproHC (9/5 - 9/16)   - Continue on Bradford (9/1 - ) as below   - ENT following and ear wick change QD   - Wick out but needs ? Debridement wound care called/fu ent   - ENT recommending CT IAC w/ IV con but family is refusing as concerned given CKD, kidneys wouldn't recover from IV contrast. Spoke with Nephrology, ENT, and patient's  at length. Planned for CT non con and per , decision will be made from there but for now doesn't want to risk further harming kidneys.  - CT IAC showing acute on chronic left-sided otitis externa and acute on chronic left-sided otomastoiditis. Superimposed left-sided tympanosclerosis. Superimposed cholesteatoma formation within the left   tympanomastoid cavity cannot be excluded. No evidence of malignant otitis externa.    # Oral Lesions with questionable Zoster vs Trauma?   - Patient with tongue pain and seen with anterior ulcerations consolidating and crusting and posterior ulceration.  - Case discussed with pathology resident and culture/ cytology sent.   - HSV negative and Path (9/6) with normal appearing epithelial cells singly and in clusters devoid of viral cytopathic effect.   - Continue on magic mouth wash for pain    RESPIRATORY  # AHHRF second to volume overload   - Hx of CKD and HFpEF presented with SOB and hypercarbia second to volume overload requiring intubation and HD initiation   - Vent weaning attempted however limited requiring tracheostomy (9/1) with IP   - Course complicated by PIPs elevated (50s) and respiratory acidosis second to secretions vs volume ?    - Vent adjusted 20/300/5/30 without improvement.   - POCUS (9/8) with BL pleural effusions (large on right and small on left)   - CT CHEST (9/8) with TBM, BL pleural effusions and continued consolidations   - Continue on vent and given TBM increased PEEP (9/9) to 20/300/8/40 with overall improvement   - Continue on Duoneb and HTS for airway clearance   - Continue volume removal with diuresis and aggressive UF sessions.   - Discussing possible thora but appears improved and will monitor.  ]  - Bedside US showing decrease in pleural effusion - hold off thoracentesis 9/10     GI  # UGIB   - CGE x 1 episode on 8/24 and melena x 2 episodes on 8/31 likely residual blood.   - EGD (8/31) found with esophagitis and erosive gastropathy  - Continue on PPI BID through late October and then PPI QD   - No melena     # Dysphagia   - NGT-TF continued   - Pending PEG however needs improvement in infectious status prior to placement.     RENAL  # Progressive CKD   - Presented volume overload and progressive RUSS on CKD (baseline CRE in 2s and mode into the 3s on admission) likely obstruction vs low flow state with shock vs AIN from drug reaction issue?  - POCUS with no signs of hydronephrosis   - Pressor support started with improvement in renal function  - Attempted steroid course in ICU without improvement in renal function   - Case discussed at length with renal and initiated on HD in ICU and now continued on HD TTHS, however remains volume overloaded.   - Patient oliguric however responds to Bumex pushes   - Nephro following - will continue to hold off on HD at this time  - Monitor renal function and UOP, and electrolytes for now  - Urinary retention overnight, s/p Straight cath x 1 with close to over half a litter in bladder    Hyponatremia (improving) Na 123>>125  - c/w Salt tabs - s/p Hypertonic 1.5%NS @ 50cc/hr x 5 hr  - Renal- holding off on HD for now      # Hyperphosphatemia (resolved)   - PTH normal and elevated phos likely from renal failure  - s/p Phos binder with Renvela - now d'beth as level wnl      INFECTIOUS DISEASE  # ESBL ECOLI and MSSA VAP c/b MSSA bacteremia   - RVP/ COVID (8/11) negative   - SCx (8/11) with MSSA s/p cefepime (8/12-8/13) and then ancef (8/14) however noted with drug reaction and then changed to vanco and aztreonam (8/12-8/13) in ICU .   - Course complicated by recurrent fevers and return of MSSA with bacteremia.   - SCx (9/1) with MSSA and ESBL ECOLI   - BAL (9/1) with MSSA   - BCx (9/1) with MSSA and cleared on (9/4)   - ECHO (9/6) unable to rule out endocarditis   - Continue on Bradford (9/4 - ) and Vanco BY DOSE POST HD (9/4, 9/7, 9/9 ...) with duration TBD at this time.   - Given inability to rule out endocarditis will discuss possible 4 wks with ID?   - Course complicated by fever (9/7) and UA with leuks but no bacteria, however compared to prior improved, RVP/COVID and SCx negative, and RPT CXR similar to prior   - Pending BCx, SCx, UCx, and LE DOPPLERS- Blood and sputum cx NTD; Acute left deep vein thrombosis of the left popliteal vein.  - Febrile overnight 102 recultured and sent off   -Pt receiving vanco post HD however today given vanco 750mg x 1 will check Vanco level at end of HD session and dose     # Left ear OE   - ENT evaluation (9/7) with no mastoid tenderness, however found with positive swelling and excoriation to left auricle and tenderness to manipulation of auricle. Debrided crusting of auricle and EAC evaluated with edema and unable to visualize TM. EAC debrided and found with watery exudate.   - Continue on CiproHC (9/5 - )   - Continue on Bradford (9/1 - ) as below     # MRSA PCRs   - MRSA PCR (8/11 and 8/14) negative   - Next MRSA + PCR ordered for 10/8     HEME  # Anemia second to GIB vs renal disease   - Hold chemical DVT PPX given bleeding/ waxing and waning HH   - s/p multiple units of PRBCs (8/15, 8/21, 8/28, 8/31 and 9/8)   - Anemia panel with AOCD but with low %sat and ferrous sulfate added to optimize   - Monitor HH and discuss with renal for EPO sometime next week.   - Spoke with GI for clearance to start Heparin gtt for + DVT     Vascular  # DVT  - Pt with + popliteal DVT on SCD Spoke with GI no absolute contraindication for starting Heparin - advise close monitoring for bleeding - Do stat CTA if bleeding occurs and call IR   - Pt specific heparin gtt started with goal PTT 60-85  - will monitor closely     ONC   # Hx of Breast CA   - Patient dx in 2018 and s/p BL mastectomy (radical on right) and chemo and RT.   - Supportive care.     ENDOCRINE  # IDDM2   - Continue on NPH 3U and ISS   - Monitor FS and adjust as needed     LINES/ TUBES  - R ARTHUR TAYLOR (8/19 - )     SKIN  # Drug Eruption   - Attempted cefepime (8/12) vs ancef (8/13-8/14) and then noted with drug rxn.   - Hold further cephalosporins at this time   - s/p Steroids with prednisone 40 (8/18 - 9/2) then prednisone 30 (9/3-9/7), then prednisone 20 (9/8 - 9/10), then prednisone 10mg (9/11-9/13) then turn off.     ETHICS/ GOC    - FULL CODE     DISPO - TBD

## 2023-09-18 NOTE — PROGRESS NOTE ADULT - NS ATTEND AMEND GEN_ALL_CORE FT
Pt is a 73F with PMHx IDDMII, CKD, hx CVA, CHFpEF, latent TB (s/p tx,) hx breast cancer (2018, s/p mastectomy and adjuvant CT/RT), and hx CVA s/p trach/PEG (now decannulated) initially presenting to [facility] on 8/11/23 with acute hypoxemic and hypercapnic respiratory failure s/p ETT intubation/MV and ARF with pulmonary edema c/b HTN emergency requiring nicardipine drip transferred to MICU for further management.     Pt initially p/w worsening RUE shaking and dysconjugate gaze with MRI 8/17 (+) new right cerebellar, midbrain, and multiple tiny embolic infarcts as well as known left PCA CVA. EEG (-). 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 ILR in place from prior CVA evaluation, last interrogated 9/7 without AF. At baseline, pt reportedly wheelchair bound with RUE tremors and some ADL assistance. Pt was unable to tolerate ASA 2/2 GIB, will re-attempt once more stable. Worsening mentation this weekend possibly 2/2 uremic-induced encephalopathy. No new focal deficits noted.     Pt with acute hypoxemic and hypercapnic respiratory failure s/p ETT intubation/MV possibly 2/2 flash pulmonary edema in the setting of HTN emergency +/- aspiration event. Pt with failure of ventilatory weaning now s/p tracheostomy placement (IP 9/1 with sutures now removed). Of note, pt also noted to have evidence of midbrain CVA with possible concern for central effect on respiratory drive, will monitor carefully and continue weaning trials as tolerated. Pt able to spontaneously breath intermittently, will continue to attempt further weaning trials. Airway clearance therapy in place. Wean O2 supplementation for goal O2 saturation 90-95%. Oral hygiene and aspiration precautions in place. Trach care and suctioning as per RCU team.     Pt p/w ARF on CKD requiring initiation of HD 2/2 pulmonary edema and metabolic acidosis with metabolic encephalopathy. No urgent indication for HD today, further HD as per nephrology team. Finishing prednisone taper for possible AIN. Strict I/Os, pt makes small amount of urine. Acute hypervolemic hyponatremia in the setting of renal failure improving. Will c/w diuretics + salt tabs with BMP q12hr. Renal following, recs appreciated.     Hospital course further c/b recurrent infections, most recently with MSSA bacteremia (9/1, cleared 9/4 and 9/8) with (+) MSSA and ESBL EColi in BAL as well. Pt with possible cefepime vs. cefazolin drug-induced rash followed by transition to vancomycin. Was then found to have left otitis externa (9/5) now requiring serial bedside debridement with worsening necrotic tissue/erythema for which she was also initiated on meropenem. CTTB pending, family refusing contrast, non-contrast study obtained with evidence of bone involvement but no indication for further surgical intervention. Cultures NTD, pt remains febrile intermittently. Will continue with meropenem, duration to be determined given concern for osteo. Ciprodex otic drops x10 day course. Plan to complete vancomycin by level x4 week course through 10/2 (given inability to exclude endocarditis).     Pt also with oropharyngeal dysphagia requiring NGTs followed by UGIB s/p EGD (8/31) found to have esophagitis/erosive gastropathy now on PPI BID. Pt further found to have popliteal DVT. No c/i by GI for A/C, pt initiated on heparin drip with patient-specific pTTs. Will need to ultimately transition to Coumadin x3-6 months after PEG placement.      Dispo pending medical optimization. Pt full code. DVT ppx full A/C with heparin drip. Discussed with pt's family ( and daughter) at bedside daily.

## 2023-09-18 NOTE — PROGRESS NOTE ADULT - SUBJECTIVE AND OBJECTIVE BOX
Overnight events noted      VITAL:  T(C): , Max: 37 (09-17-23 @ 18:42)  T(F): , Max: 98.6 (09-17-23 @ 18:42)  HR: 100 (09-18-23 @ 10:56)  BP: 111/43 (09-18-23 @ 10:00)  BP(mean): 58 (09-18-23 @ 05:30)  RR: 20 (09-18-23 @ 10:00)  SpO2: 95% (09-18-23 @ 10:56)  Wt(kg): --      PHYSICAL EXAM:  Constitutional: trach to vent  HEENT: + tracheostomy, + NGT  Respiratory: Coarse BS  Cardiovascular: S1 and S2  Gastrointestinal: BS+, soft, NT/ND  Extremities: + peripheral edema  Neurological: UTO  : +external catheter   Skin: No rashes  Access: RIJ temp HD catheter (8/19)      LABS:                        8.9    5.10  )-----------( 180      ( 18 Sep 2023 06:15 )             27.6     Na(125)/K(4.4)/Cl(87)/HCO3(22)/BUN(103)/Cr(2.62)Glu(224)/Ca(8.6)/Mg(2.50)/PO4(3.9)    09-18 @ 06:15  Na(123)/K(4.2)/Cl(88)/HCO3(23)/BUN(99)/Cr(2.63)Glu(205)/Ca(8.4)/Mg(2.50)/PO4(3.4)    09-17 @ 06:47  Na(123)/K(4.1)/Cl(89)/HCO3(22)/BUN(92)/Cr(2.63)Glu(222)/Ca(8.1)/Mg(2.40)/PO4(3.2)    09-16 @ 06:00  Na(123)/K(4.4)/Cl(88)/HCO3(24)/BUN(90)/Cr(2.59)Glu(211)/Ca(8.0)/Mg(2.50)/PO4(3.2)    09-15 @ 16:25      IMPRESSION: 73F w/ HTN, DM2, CVA, breast CA-bilateral mastectomy, recurrent aspiration pneumonia/respiratory failure, and CKD, 8/11/23 p/w acute hypercapnic respiratory failure; c/b RUSS    (1)CKD - stage 4    (2)RUSS - ATN vs AIN -improved relative to a few weeks ago - creatinine plateauing in mid-2s, corresponding with GFR ~10-15ml/min     (3)Hyponatremia - low but slowly improving, on NaCL tabs, IV Bumex, and s/p intermittent 1.5%NS    (4)Pulm- hypercapnic respiratory failure on admission - now s/p trach; remains on vent     (5)ID - on IV Meropenem/Vanco. BCx w/ NGTD    (6)Anemia - Hb low but stable      RECOMMEND:  (1)NaCl 2gm bid via NGT as ordered  (2)Bumex 2mg IV bid as ordered  (3)Dose new meds for GFR 10-15  (4)Can d/c ilene Colon MD  [facility]  Office/on call physician: [phone]  Cell (7a-7p): [phone]        at bedside; no complaints      VITAL:  T(C): , Max: 37 (09-17-23 @ 18:42)  T(F): , Max: 98.6 (09-17-23 @ 18:42)  HR: 100 (09-18-23 @ 10:56)  BP: 111/43 (09-18-23 @ 10:00)  BP(mean): 58 (09-18-23 @ 05:30)  RR: 20 (09-18-23 @ 10:00)  SpO2: 95% (09-18-23 @ 10:56)  Wt(kg): --      PHYSICAL EXAM:  Constitutional: trach to vent  HEENT: + tracheostomy, + NGT  Respiratory: Coarse BS  Cardiovascular: S1 and S2  Gastrointestinal: BS+, soft, NT/ND  Extremities: + peripheral edema  Neurological: UTO  : +external catheter   Skin: No rashes  Access: RIJ temp HD catheter (8/19)      LABS:                        8.9    5.10  )-----------( 180      ( 18 Sep 2023 06:15 )             27.6     Na(125)/K(4.4)/Cl(87)/HCO3(22)/BUN(103)/Cr(2.62)Glu(224)/Ca(8.6)/Mg(2.50)/PO4(3.9)    09-18 @ 06:15  Na(123)/K(4.2)/Cl(88)/HCO3(23)/BUN(99)/Cr(2.63)Glu(205)/Ca(8.4)/Mg(2.50)/PO4(3.4)    09-17 @ 06:47  Na(123)/K(4.1)/Cl(89)/HCO3(22)/BUN(92)/Cr(2.63)Glu(222)/Ca(8.1)/Mg(2.40)/PO4(3.2)    09-16 @ 06:00  Na(123)/K(4.4)/Cl(88)/HCO3(24)/BUN(90)/Cr(2.59)Glu(211)/Ca(8.0)/Mg(2.50)/PO4(3.2)    09-15 @ 16:25      IMPRESSION: 73F w/ HTN, DM2, CVA, breast CA-bilateral mastectomy, recurrent aspiration pneumonia/respiratory failure, and CKD, 8/11/23 p/w acute hypercapnic respiratory failure; c/b RUSS    (1)CKD - stage 4    (2)RUSS - ATN vs AIN -improved relative to a few weeks ago - creatinine plateauing in mid-2s, corresponding with GFR ~10-15ml/min     (3)Hyponatremia - low but slowly improving, on NaCL tabs, IV Bumex, and s/p intermittent 1.5%NS    (4)Pulm- hypercapnic respiratory failure on admission - now s/p trach; remains on vent     (5)ID - on IV Meropenem/Vanco. BCx w/ NGTD    (6)Anemia - Hb low but stable      RECOMMEND:  (1)NaCl 2gm bid via NGT as ordered  (2)Bumex 2mg IV bid as ordered  (3)Dose new meds for GFR 10-15  (4)Can d/c ilene Colon MD  [facility]  Office/on call physician: [phone]  Cell (7a-7p): [phone]

## 2023-09-18 NOTE — PROGRESS NOTE ADULT - SUBJECTIVE AND OBJECTIVE BOX
Subjective: Patient seen and examined. No new events except as noted.     REVIEW OF SYSTEMS:  Unable to obtain     MEDICATIONS:  MEDICATIONS  (STANDING):  albuterol/ipratropium for Nebulization 3 milliLiter(s) Nebulizer every 6 hours  atorvastatin 40 milliGRAM(s) Oral at bedtime  buMETAnide Injectable 2 milliGRAM(s) IV Push two times a day  chlorhexidine 0.12% Liquid 15 milliLiter(s) Oral Mucosa every 12 hours  chlorhexidine 2% Cloths 1 Application(s) Topical daily  dextrose 5%. 1000 milliLiter(s) (50 mL/Hr) IV Continuous <Continuous>  dextrose 5%. 1000 milliLiter(s) (100 mL/Hr) IV Continuous <Continuous>  dextrose 50% Injectable 12.5 Gram(s) IV Push once  dextrose 50% Injectable 25 Gram(s) IV Push once  dextrose 50% Injectable 25 Gram(s) IV Push once  doxazosin 6 milliGRAM(s) Oral <User Schedule>  epoetin odalis (EPOGEN) Injectable 41778 Unit(s) SubCutaneous <User Schedule>  ferrous    sulfate Liquid 300 milliGRAM(s) Enteral Tube daily  glucagon  Injectable 1 milliGRAM(s) IntraMuscular once  heparin  Infusion 1000 Unit(s)/Hr (9.5 mL/Hr) IV Continuous <Continuous>  insulin lispro (ADMELOG) corrective regimen sliding scale   SubCutaneous every 6 hours  insulin NPH human recombinant 3 Unit(s) SubCutaneous every 6 hours  meropenem  IVPB 500 milliGRAM(s) IV Intermittent every 12 hours  mupirocin 2% Ointment 1 Application(s) Both Nostrils two times a day  pantoprazole  Injectable 40 milliGRAM(s) IV Push two times a day  psyllium Powder 1 Packet(s) Oral daily  sodium chloride 2 Gram(s) Oral two times a day  sodium chloride 1.5%. 500 milliLiter(s) (50 mL/Hr) IV Continuous <Continuous>  sodium chloride 3%  Inhalation 4 milliLiter(s) Inhalation every 6 hours      PHYSICAL EXAM:  T(C): 36.4 (09-18-23 @ 10:00), Max: 37 (09-17-23 @ 18:42)  HR: 100 (09-18-23 @ 10:56) (78 - 104)  BP: 111/43 (09-18-23 @ 10:00) (79/60 - 121/43)  RR: 20 (09-18-23 @ 10:00) (20 - 21)  SpO2: 95% (09-18-23 @ 10:56) (94% - 100%)  Wt(kg): --  I&O's Summary    17 Sep 2023 07:01  -  18 Sep 2023 07:00  --------------------------------------------------------  IN: 1808 mL / OUT: 700 mL / NET: 1108 mL            Appearance: NAD, +trach  HEENT: dry oral mucosa  Lymphatic: No lymphadenopathy  Cardiovascular: Normal S1 S2, No JVD, No murmurs, No edema  Respiratory: Decreased BS, + trach to vent	  Neuro: opens eyes  Gastrointestinal: Soft, Non-tender, + BS, + NGT  Skin: No rashes, No ecchymoses, No cyanosis	  Extremities: No strength/ROM 2/2 sedation, + BL LE edema  Vascular: Peripheral pulses palpable 2+ bilaterally        LABS:    CARDIAC MARKERS:                                8.9    5.10  )-----------( 180      ( 18 Sep 2023 06:15 )             27.6     09-18    125<L>  |  87<L>  |  103<H>  ----------------------------<  224<H>  4.4   |  22  |  2.62<H>    Ca    8.6      18 Sep 2023 06:15  Phos  3.9     09-18  Mg     2.50     09-18      proBNP:   Lipid Profile:   HgA1c:   TSH:             TELEMETRY: 	    ECG:  	  RADIOLOGY:   DIAGNOSTIC TESTING:  [ ] Echocardiogram:  [ ]  Catheterization:  [ ] Stress Test:    OTHER:

## 2023-09-18 NOTE — CHART NOTE - NSCHARTNOTEFT_GEN_A_CORE
Pt seen for severe malnutrition follow up     Medical Course: Per chart 73 year old female with PMHx of IDDM, HTN, CKD, HFpEF, Breast Cancer s/p BL mastectomy, chemotherapy and radiation (2018), Respiratory and Cardiac Arrest (2018), 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 Course further complicated by prolonged vent time s/p tracheostomy 9/1 and transferred to RCU 9/3 completed by recurrent fevers with high PIP, respiratory acidosis and concern for volume overloaded.    Nutrition Course: Unable to conduct nutrition interview 2/2 cognitive status, collateral obtained from  at bedside. Pt NPO TF running at goal rate. Receiving Nepro with carb steady @35ml/hr x24hrs to provide 840ml total formula, 1512 iris, 68g protein, 609ml free water from formula. Prosource 1x/day to meet increased protein needs providing total of 83g pro. Nephrology holding off on HD, last session 9/9. Pt started on banatrol and psyllium powder 9/12 for loose stool. Potassium levels currently within normal limits, if elevated would recommend to d/c banatrol. Labs notable for hyponatremia, sodium chloride tabs in place.   Per pulm note, Pt needs improvement of infectious state before consideration for PEG. All nutrition related questions from  answered.     Diet Prescription: Diet, NPO with Tube Feed:   Tube Feeding Modality: Nasogastric  Nepro with Carb Steady (NEPRORTH)  Total Volume for 24 Hours (mL): 840  Continuous  Starting Tube Feed Rate {mL per Hour}: 10  Increase Tube Feed Rate by (mL): 10     Every 4 hours  Until Goal Tube Feed Rate (mL per Hour): 35  Tube Feed Duration (in Hours): 24  Tube Feed Start Time: 17:49  Tube Feed Stop Time: 00:00  No Carb Prosource (1pkg = 15gms Protein)     Qty per Day:  1  Banatrol TF     Qty per Day:  2 (09-12-23 @ 14:26)    Pertinent Medications: MEDICATIONS  (STANDING):  albuterol/ipratropium for Nebulization 3 milliLiter(s) Nebulizer every 6 hours  atorvastatin 40 milliGRAM(s) Oral at bedtime  buMETAnide Injectable 2 milliGRAM(s) IV Push two times a day  chlorhexidine 0.12% Liquid 15 milliLiter(s) Oral Mucosa every 12 hours  chlorhexidine 2% Cloths 1 Application(s) Topical daily  dextrose 5%. 1000 milliLiter(s) (50 mL/Hr) IV Continuous <Continuous>  dextrose 5%. 1000 milliLiter(s) (100 mL/Hr) IV Continuous <Continuous>  dextrose 50% Injectable 12.5 Gram(s) IV Push once  dextrose 50% Injectable 25 Gram(s) IV Push once  dextrose 50% Injectable 25 Gram(s) IV Push once  doxazosin 6 milliGRAM(s) Oral <User Schedule>  epoetin odalis (EPOGEN) Injectable 24121 Unit(s) SubCutaneous <User Schedule>  ferrous    sulfate Liquid 300 milliGRAM(s) Enteral Tube daily  glucagon  Injectable 1 milliGRAM(s) IntraMuscular once  heparin  Infusion 1000 Unit(s)/Hr (9.5 mL/Hr) IV Continuous <Continuous>  insulin lispro (ADMELOG) corrective regimen sliding scale   SubCutaneous every 6 hours  insulin NPH human recombinant 3 Unit(s) SubCutaneous every 6 hours  meropenem  IVPB 500 milliGRAM(s) IV Intermittent every 12 hours  mupirocin 2% Ointment 1 Application(s) Both Nostrils two times a day  pantoprazole  Injectable 40 milliGRAM(s) IV Push two times a day  psyllium Powder 1 Packet(s) Oral daily  sodium chloride 2 Gram(s) Oral two times a day  sodium chloride 1.5%. 500 milliLiter(s) (50 mL/Hr) IV Continuous <Continuous>  sodium chloride 3%  Inhalation 4 milliLiter(s) Inhalation every 6 hours    MEDICATIONS  (PRN):  acetaminophen   Oral Liquid .. 650 milliGRAM(s) Oral every 6 hours PRN Temp greater or equal to 38C (100.4F), Mild Pain (1 - 3)  dextrose Oral Gel 15 Gram(s) Oral once PRN Blood Glucose LESS THAN 70 milliGRAM(s)/deciliter    Pertinent Labs: 09-18 Na125 mmol/L<L> Glu 224 mg/dL<H> K+ 4.4 mmol/L Cr  2.62 mg/dL<H>  mg/dL<H> 09-18 Phos 3.9 mg/dL 09-14 Chol 63 mg/dL LDL --    HDL 30 mg/dL<L> Trig 156 mg/dL<H>     CAPILLARY BLOOD GLUCOSE  POCT Blood Glucose.: 228 mg/dL (18 Sep 2023 11:09)  POCT Blood Glucose.: 245 mg/dL (18 Sep 2023 05:04)  POCT Blood Glucose.: 191 mg/dL (17 Sep 2023 23:45)  POCT Blood Glucose.: 180 mg/dL (17 Sep 2023 17:16)      Weight: Height (cm): 154.9 (08-11 @ 13:15), 152.4 (07-12 @ 11:29)  Weight (kg): 66.5 (08-11 @ 13:15), 59 (07-12 @ 11:29)  BMI (kg/m2): 27.7 (08-11 @ 13:15), 25.4 (07-12 @ 11:29)  Weight Assessment: 9/10: 67.8kg, 9/2: 64.3kg, 8/12: 66.5kg (weight trend relatively stable, Pt also noted with edema)      Physical Assessment, per flowsheets:  Edema: +2 generalized edema   Skin: Inner left ear wound       Estimated Needs:   [X] No change since previous assessment, based on +10% of IBW: 115lbs/ 52.5kg  25-30 kcals/kg = [id number] kcals/d  1.5-1.8.g protein/kg = 80-95g protein/d    Previous Nutrition Diagnosis: severe protein calorie malnutrition    Nutrition Diagnosis is [x ] ongoing      Education:  [ x ] Not warranted at present    Interventions:   1) Continue current TF: Nepro @35ml/hrx x24hrs   2) No Carb Prosource Qty per day: 1; (provides 15gm protein)   3) Free water flush deferred to MD   4) Obtain weekly weights   5) RD available prn    Monitor & Evaluate:  Tolerance to EN, nutrition related lab values, weight trends, BMs/GI distress, hydration status, skin integrity.    Jacquelyn Joya MS, RD| [phone] (Also available on TEAMS)

## 2023-09-18 NOTE — PROGRESS NOTE ADULT - ASSESSMENT
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 family declined renal biopsy started on prednisone  s/p trach 9/1 and BAL with MSSA   ET cx with ESBL and MSSA  started on ana cristina 9/1  blood cx 9/1: MSSA   repeat negative 9/4, 9/8  CXR with b/l opacities, R increased  L ear edema and otitis externa    initial  resp failure for ?fluid ovrer load but also had MSSA in the sputum, got vanco, aztreonam one day then cefepime and had rash, renal failure which was considered due to cefepime and then received 2 days of cefazolin and then off, now with trach and bronc on 9/1 with MSSA bacteremia and BAL also MSSA  another ET cx with MSSA and ESBL E-coli  hypotension, MSSA bacteremia likely due to pneumonia, repeat blood cx negative 9/4, TTE limited unable to exclude endocarditis  L otitis externa on ana cristina since 9/1 but worsening edema and black discoloration with bullae forming and persistent fever (ear cx was negative)  Ct showed acute on chronic otitis externa and otomastoiditis , superimposed cholesteatoma can not be excluded, no malignant otitis externa  pt again febrile while ear has improved  * if fever again, would do chest/abd/pelvis CT with contrast  * s/p debridement of necrotic tissue by ENT, cx with candida albicans likely just skin richa  * c/w ana cristina, started 9/1  * cannot do cefazolin so will have to treat with vanco  * no plan for HD so c/w vanco per level  * will need a 4 week course of vanco for the MSSA bacteremia from the negative blood cx through 10/2  * monitor CBC/diff and renal function    The above assessment and plan was discussed with the primary team    Yumiko Conner MD  contact on teams  After 5pm and on weekends call [phone]

## 2023-09-18 NOTE — PROGRESS NOTE ADULT - SUBJECTIVE AND OBJECTIVE BOX
CHIEF COMPLAINT: Patient is a 73y old  Female who presents with a chief complaint of Respiratory distress (17 Sep 2023 20:54)    Interval Events:    REVIEW OF SYSTEMS:  [ ] All other systems negative  [ ] Unable to assess ROS because ________    Mode: AC/ CMV (Assist Control/ Continuous Mandatory Ventilation), RR (machine): 20, TV (machine): 300, FiO2: 30, PEEP: 8, ITime: 0.53, MAP: 14, PIP: 35    OBJECTIVE:  ICU Vital Signs Last 24 Hrs  T(C): 36.5 (18 Sep 2023 05:30), Max: 37 (17 Sep 2023 18:42)  T(F): 97.7 (18 Sep 2023 05:30), Max: 98.6 (17 Sep 2023 18:42)  HR: 99 (18 Sep 2023 07:15) (78 - 104)  BP: 109/41 (18 Sep 2023 05:30) (79/60 - 121/43)  BP(mean): 58 (18 Sep 2023 05:30) (39 - 61)  ABP: --  ABP(mean): --  RR: 20 (18 Sep 2023 05:30) (20 - 21)  SpO2: 94% (18 Sep 2023 07:15) (94% - 100%)    O2 Parameters below as of 18 Sep 2023 05:30  Patient On (Oxygen Delivery Method): ventilator    O2 Concentration (%): 30      Mode: AC/ CMV (Assist Control/ Continuous Mandatory Ventilation), RR (machine): 20, TV (machine): 300, FiO2: 30, PEEP: 8, ITime: 0.53, MAP: 14, PIP: 35    09-17 @ 07:01  -  09-18 @ 07:00  --------------------------------------------------------  IN: 1808 mL / OUT: 700 mL / NET: 1108 mL      CAPILLARY BLOOD GLUCOSE      POCT Blood Glucose.: 245 mg/dL (18 Sep 2023 05:04)      HOSPITAL MEDICATIONS:  MEDICATIONS  (STANDING):  albuterol/ipratropium for Nebulization 3 milliLiter(s) Nebulizer every 6 hours  atorvastatin 40 milliGRAM(s) Oral at bedtime  buMETAnide Injectable 2 milliGRAM(s) IV Push two times a day  chlorhexidine 0.12% Liquid 15 milliLiter(s) Oral Mucosa every 12 hours  chlorhexidine 2% Cloths 1 Application(s) Topical daily  dextrose 5%. 1000 milliLiter(s) (50 mL/Hr) IV Continuous <Continuous>  dextrose 5%. 1000 milliLiter(s) (100 mL/Hr) IV Continuous <Continuous>  dextrose 50% Injectable 12.5 Gram(s) IV Push once  dextrose 50% Injectable 25 Gram(s) IV Push once  dextrose 50% Injectable 25 Gram(s) IV Push once  doxazosin 6 milliGRAM(s) Oral <User Schedule>  epoetin odalis (EPOGEN) Injectable 50234 Unit(s) SubCutaneous <User Schedule>  ferrous    sulfate Liquid 300 milliGRAM(s) Enteral Tube daily  glucagon  Injectable 1 milliGRAM(s) IntraMuscular once  heparin  Infusion 1000 Unit(s)/Hr (9.5 mL/Hr) IV Continuous <Continuous>  insulin lispro (ADMELOG) corrective regimen sliding scale   SubCutaneous every 6 hours  insulin NPH human recombinant 3 Unit(s) SubCutaneous every 6 hours  meropenem  IVPB 500 milliGRAM(s) IV Intermittent every 12 hours  mupirocin 2% Ointment 1 Application(s) Both Nostrils two times a day  pantoprazole  Injectable 40 milliGRAM(s) IV Push two times a day  psyllium Powder 1 Packet(s) Oral daily  sodium chloride 2 Gram(s) Oral two times a day  sodium chloride 1.5%. 500 milliLiter(s) (50 mL/Hr) IV Continuous <Continuous>  sodium chloride 3%  Inhalation 4 milliLiter(s) Inhalation every 6 hours    MEDICATIONS  (PRN):  acetaminophen   Oral Liquid .. 650 milliGRAM(s) Oral every 6 hours PRN Temp greater or equal to 38C (100.4F), Mild Pain (1 - 3)  dextrose Oral Gel 15 Gram(s) Oral once PRN Blood Glucose LESS THAN 70 milliGRAM(s)/deciliter      LABS:                        8.9    5.10  )-----------( 180      ( 18 Sep 2023 06:15 )             27.6     09-18    125<L>  |  87<L>  |  103<H>  ----------------------------<  224<H>  4.4   |  22  |  2.62<H>    Ca    8.6      18 Sep 2023 06:15  Phos  3.9     09-18  Mg     2.50     09-18      PTT - ( 18 Sep 2023 06:15 )  PTT:85.9 sec  Urinalysis Basic - ( 18 Sep 2023 06:15 )    Color: x / Appearance: x / SG: x / pH: x  Gluc: 224 mg/dL / Ketone: x  / Bili: x / Urobili: x   Blood: x / Protein: x / Nitrite: x   Leuk Esterase: x / RBC: x / WBC x   Sq Epi: x / Non Sq Epi: x / Bacteria: x    PAST MEDICAL & SURGICAL HISTORY:  Breast CA      Diabetes      Stroke      Cardiac arrest      HTN (hypertension)      H/O mastectomy, bilateral      FAMILY HISTORY:  No pertinent family history in first degree relatives      Social History:      RADIOLOGY:  [ ] Reviewed and interpreted by me    PULMONARY FUNCTION TESTS:    EKG: CHIEF COMPLAINT: Patient is a 73y old  Female who presents with a chief complaint of Respiratory distress (17 Sep 2023 20:54)    Interval Events: None reported overnight. Clinically unchanged.    REVIEW OF SYSTEMS:  See above  [x] Unable to assess ROS because poor mental status     Mode: AC/ CMV (Assist Control/ Continuous Mandatory Ventilation), RR (machine): 20, TV (machine): 300, FiO2: 30, PEEP: 8, ITime: 0.53, MAP: 14, PIP: 35    OBJECTIVE:  ICU Vital Signs Last 24 Hrs  T(C): 36.5 (18 Sep 2023 05:30), Max: 37 (17 Sep 2023 18:42)  T(F): 97.7 (18 Sep 2023 05:30), Max: 98.6 (17 Sep 2023 18:42)  HR: 99 (18 Sep 2023 07:15) (78 - 104)  BP: 109/41 (18 Sep 2023 05:30) (79/60 - 121/43)  BP(mean): 58 (18 Sep 2023 05:30) (39 - 61)  ABP: --  ABP(mean): --  RR: 20 (18 Sep 2023 05:30) (20 - 21)  SpO2: 94% (18 Sep 2023 07:15) (94% - 100%)    O2 Parameters below as of 18 Sep 2023 05:30  Patient On (Oxygen Delivery Method): ventilator    O2 Concentration (%): 30      Mode: AC/ CMV (Assist Control/ Continuous Mandatory Ventilation), RR (machine): 20, TV (machine): 300, FiO2: 30, PEEP: 8, ITime: 0.53, MAP: 14, PIP: 35    09-17 @ 07:01  -  09-18 @ 07:00  --------------------------------------------------------  IN: 1808 mL / OUT: 700 mL / NET: 1108 mL      CAPILLARY BLOOD GLUCOSE      POCT Blood Glucose.: 245 mg/dL (18 Sep 2023 05:04)      HOSPITAL MEDICATIONS:  MEDICATIONS  (STANDING):  albuterol/ipratropium for Nebulization 3 milliLiter(s) Nebulizer every 6 hours  atorvastatin 40 milliGRAM(s) Oral at bedtime  buMETAnide Injectable 2 milliGRAM(s) IV Push two times a day  chlorhexidine 0.12% Liquid 15 milliLiter(s) Oral Mucosa every 12 hours  chlorhexidine 2% Cloths 1 Application(s) Topical daily  dextrose 5%. 1000 milliLiter(s) (50 mL/Hr) IV Continuous <Continuous>  dextrose 5%. 1000 milliLiter(s) (100 mL/Hr) IV Continuous <Continuous>  dextrose 50% Injectable 12.5 Gram(s) IV Push once  dextrose 50% Injectable 25 Gram(s) IV Push once  dextrose 50% Injectable 25 Gram(s) IV Push once  doxazosin 6 milliGRAM(s) Oral <User Schedule>  epoetin odalis (EPOGEN) Injectable 33666 Unit(s) SubCutaneous <User Schedule>  ferrous    sulfate Liquid 300 milliGRAM(s) Enteral Tube daily  glucagon  Injectable 1 milliGRAM(s) IntraMuscular once  heparin  Infusion 1000 Unit(s)/Hr (9.5 mL/Hr) IV Continuous <Continuous>  insulin lispro (ADMELOG) corrective regimen sliding scale   SubCutaneous every 6 hours  insulin NPH human recombinant 3 Unit(s) SubCutaneous every 6 hours  meropenem  IVPB 500 milliGRAM(s) IV Intermittent every 12 hours  mupirocin 2% Ointment 1 Application(s) Both Nostrils two times a day  pantoprazole  Injectable 40 milliGRAM(s) IV Push two times a day  psyllium Powder 1 Packet(s) Oral daily  sodium chloride 2 Gram(s) Oral two times a day  sodium chloride 1.5%. 500 milliLiter(s) (50 mL/Hr) IV Continuous <Continuous>  sodium chloride 3%  Inhalation 4 milliLiter(s) Inhalation every 6 hours    MEDICATIONS  (PRN):  acetaminophen   Oral Liquid .. 650 milliGRAM(s) Oral every 6 hours PRN Temp greater or equal to 38C (100.4F), Mild Pain (1 - 3)  dextrose Oral Gel 15 Gram(s) Oral once PRN Blood Glucose LESS THAN 70 milliGRAM(s)/deciliter    PHYSICAL EXAM  GENERAL: Laying in bed comfortably, NAD  HEENT: +Trach, area c/d/i, +L ear purulent drainage noted  PULM: +Normal resp effort, + Rhonchi b/l, no w appreciated   HEART: +s1, s2  GI: +BS, soft, nt/nd, no peritoneal signs noted  MSK: No asymmetry, LIZZIE in upper extremities. RLE weakness- baseline. 2+ b/l pitting edema noted in lower ext   NEURO: Does not follow commands    LABS:                        8.9    5.10  )-----------( 180      ( 18 Sep 2023 06:15 )             27.6     09-18    125<L>  |  87<L>  |  103<H>  ----------------------------<  224<H>  4.4   |  22  |  2.62<H>    Ca    8.6      18 Sep 2023 06:15  Phos  3.9     09-18  Mg     2.50     09-18      PTT - ( 18 Sep 2023 06:15 )  PTT:85.9 sec  Urinalysis Basic - ( 18 Sep 2023 06:15 )    Color: x / Appearance: x / SG: x / pH: x  Gluc: 224 mg/dL / Ketone: x  / Bili: x / Urobili: x   Blood: x / Protein: x / Nitrite: x   Leuk Esterase: x / RBC: x / WBC x   Sq Epi: x / Non Sq Epi: x / Bacteria: x    PAST MEDICAL & SURGICAL HISTORY:  Breast CA      Diabetes      Stroke      Cardiac arrest      HTN (hypertension)      H/O mastectomy, bilateral      FAMILY HISTORY:  No pertinent family history in first degree relatives      Social History:      RADIOLOGY:  [ ] Reviewed and interpreted by me    PULMONARY FUNCTION TESTS:    EKG: CHIEF COMPLAINT: Patient is a 73y old  Female who presents with a chief complaint of Respiratory distress (17 Sep 2023 20:54)    Interval Events: s/p hypotensive ovn in the 80 systolic, requiring Midodrine x 1 dose. Also noted with urinary retention, s/p straight cath x 1 with close to half a liter. Otherwise, no new nursing issues reported. Clinically unchanged.    REVIEW OF SYSTEMS:  See above  [x] Unable to assess ROS because poor mental status     Mode: AC/ CMV (Assist Control/ Continuous Mandatory Ventilation), RR (machine): 20, TV (machine): 300, FiO2: 30, PEEP: 8, ITime: 0.53, MAP: 14, PIP: 35    OBJECTIVE:  ICU Vital Signs Last 24 Hrs  T(C): 36.5 (18 Sep 2023 05:30), Max: 37 (17 Sep 2023 18:42)  T(F): 97.7 (18 Sep 2023 05:30), Max: 98.6 (17 Sep 2023 18:42)  HR: 99 (18 Sep 2023 07:15) (78 - 104)  BP: 109/41 (18 Sep 2023 05:30) (79/60 - 121/43)  BP(mean): 58 (18 Sep 2023 05:30) (39 - 61)  ABP: --  ABP(mean): --  RR: 20 (18 Sep 2023 05:30) (20 - 21)  SpO2: 94% (18 Sep 2023 07:15) (94% - 100%)    O2 Parameters below as of 18 Sep 2023 05:30  Patient On (Oxygen Delivery Method): ventilator    O2 Concentration (%): 30      Mode: AC/ CMV (Assist Control/ Continuous Mandatory Ventilation), RR (machine): 20, TV (machine): 300, FiO2: 30, PEEP: 8, ITime: 0.53, MAP: 14, PIP: 35    09-17 @ 07:01  -  09-18 @ 07:00  --------------------------------------------------------  IN: 1808 mL / OUT: 700 mL / NET: 1108 mL      CAPILLARY BLOOD GLUCOSE      POCT Blood Glucose.: 245 mg/dL (18 Sep 2023 05:04)      HOSPITAL MEDICATIONS:  MEDICATIONS  (STANDING):  albuterol/ipratropium for Nebulization 3 milliLiter(s) Nebulizer every 6 hours  atorvastatin 40 milliGRAM(s) Oral at bedtime  buMETAnide Injectable 2 milliGRAM(s) IV Push two times a day  chlorhexidine 0.12% Liquid 15 milliLiter(s) Oral Mucosa every 12 hours  chlorhexidine 2% Cloths 1 Application(s) Topical daily  dextrose 5%. 1000 milliLiter(s) (50 mL/Hr) IV Continuous <Continuous>  dextrose 5%. 1000 milliLiter(s) (100 mL/Hr) IV Continuous <Continuous>  dextrose 50% Injectable 12.5 Gram(s) IV Push once  dextrose 50% Injectable 25 Gram(s) IV Push once  dextrose 50% Injectable 25 Gram(s) IV Push once  doxazosin 6 milliGRAM(s) Oral <User Schedule>  epoetin odalis (EPOGEN) Injectable 48911 Unit(s) SubCutaneous <User Schedule>  ferrous    sulfate Liquid 300 milliGRAM(s) Enteral Tube daily  glucagon  Injectable 1 milliGRAM(s) IntraMuscular once  heparin  Infusion 1000 Unit(s)/Hr (9.5 mL/Hr) IV Continuous <Continuous>  insulin lispro (ADMELOG) corrective regimen sliding scale   SubCutaneous every 6 hours  insulin NPH human recombinant 3 Unit(s) SubCutaneous every 6 hours  meropenem  IVPB 500 milliGRAM(s) IV Intermittent every 12 hours  mupirocin 2% Ointment 1 Application(s) Both Nostrils two times a day  pantoprazole  Injectable 40 milliGRAM(s) IV Push two times a day  psyllium Powder 1 Packet(s) Oral daily  sodium chloride 2 Gram(s) Oral two times a day  sodium chloride 1.5%. 500 milliLiter(s) (50 mL/Hr) IV Continuous <Continuous>  sodium chloride 3%  Inhalation 4 milliLiter(s) Inhalation every 6 hours    MEDICATIONS  (PRN):  acetaminophen   Oral Liquid .. 650 milliGRAM(s) Oral every 6 hours PRN Temp greater or equal to 38C (100.4F), Mild Pain (1 - 3)  dextrose Oral Gel 15 Gram(s) Oral once PRN Blood Glucose LESS THAN 70 milliGRAM(s)/deciliter    PHYSICAL EXAM  GENERAL: Laying in bed comfortably, NAD  HEENT: +Trach, area c/d/i, +L ear purulent drainage noted  PULM: +Normal resp effort, + Rhonchi b/l, no w appreciated   HEART: +s1, s2  GI: +BS, soft, nt/nd, no peritoneal signs noted  MSK: No asymmetry, LIZZIE in upper extremities. RLE weakness- baseline. 2+ b/l pitting edema noted in lower ext   NEURO: Does not follow commands    LABS:                        8.9    5.10  )-----------( 180      ( 18 Sep 2023 06:15 )             27.6     09-18    125<L>  |  87<L>  |  103<H>  ----------------------------<  224<H>  4.4   |  22  |  2.62<H>    Ca    8.6      18 Sep 2023 06:15  Phos  3.9     09-18  Mg     2.50     09-18      PTT - ( 18 Sep 2023 06:15 )  PTT:85.9 sec  Urinalysis Basic - ( 18 Sep 2023 06:15 )    Color: x / Appearance: x / SG: x / pH: x  Gluc: 224 mg/dL / Ketone: x  / Bili: x / Urobili: x   Blood: x / Protein: x / Nitrite: x   Leuk Esterase: x / RBC: x / WBC x   Sq Epi: x / Non Sq Epi: x / Bacteria: x    PAST MEDICAL & SURGICAL HISTORY:  Breast CA      Diabetes      Stroke      Cardiac arrest      HTN (hypertension)      H/O mastectomy, bilateral      FAMILY HISTORY:  No pertinent family history in first degree relatives      Social History:      RADIOLOGY:  [ ] Reviewed and interpreted by me    PULMONARY FUNCTION TESTS:    EKG:

## 2023-09-19 NOTE — CHART NOTE - NSCHARTNOTEFT_GEN_A_CORE
[facility] [employer].    Notified by RN, HD re-attempted s/p changing dressing, however bleeding re-occurred, and dressing saturated again. Nephrology notified, HD to be stopped, will change dressing with HD RN at bedside and monitor overnight. Per chart review, RIJ placed by Dr. Singh, IJ to be re-evaluated by Pulm vs IR c/s in AM. If further bleeding or further drop in Hgb, can consider CTA & may consider stopping hep gtt for IVC filter per Cardiology's recommendation. Will continue to monitor overnight.    Matilda Tripathi PA  Medicine [id number]

## 2023-09-19 NOTE — CHART NOTE - NSCHARTNOTEFT_GEN_A_CORE
[facility] [employer].    Notified by RN, pt with bleeding from RIJ site. Gown slightly saturated, no heavy bleeding noted. HD RN at bedside with new dressing kit. Pt seen and examined, upon arrival patients family members, daughters and , at bedside. Pt NAD, laying in bed, with intermittent head tremors noted (chronic per daughter). Shiley site assessed, dressing removed, no active bleeding from insertion site. New dressing applied by HD RN with assistance, surgicel also applied to site. HD to be resumed. Per chart review, pt s/p 1uPRBC today for Hgb 6.8, will continue to monitor for any additional bleeding.    Vital Signs Last 24 Hrs  T(C): 36.7 (19 Sep 2023 19:37), Max: 36.9 (19 Sep 2023 09:20)  T(F): 98 (19 Sep 2023 19:37), Max: 98.4 (19 Sep 2023 09:20)  HR: 103 (19 Sep 2023 19:37) (90 - 107)  BP: 114/38 (19 Sep 2023 19:37) (103/48 - 118/31)  RR: 17 (19 Sep 2023 19:37) (17 - 20)  SpO2: 94% (19 Sep 2023 19:37) (92% - 100%)    Parameters below as of 19 Sep 2023 19:37  Patient On (Oxygen Delivery Method): ventilator  O2 Concentration (%): 30    Matilda Tripathi PA  Medicine [id number]

## 2023-09-19 NOTE — PROGRESS NOTE ADULT - ASSESSMENT
IMPRESSION: 73F w/ HTN, DM2, CVA, breast CA-bilateral mastectomy, recurrent aspiration pneumonia/respiratory failure, and CKD, 8/11/23 p/w acute hypercapnic respiratory failure; c/b RUSS    (1)CKD - stage 4    (2)RUSS - ATN vs AIN -improved relative to a few weeks ago - creatinine plateauing in mid-2s, corresponding with GFR ~10-15ml/min     (3)Hyponatremia - low but slowly improving/stable, on NaCL tabs, IV Bumex, and s/p intermittent 1.5%NS    (4)Pulm- hypercapnic respiratory failure on admission - now s/p trach; remains on vent     (5)ID - on IV Meropenem/Vanco. BCx w/ NGTD    (6)Anemia - Hb low - getting PRBC this a.m.    RECOMMEND:  (1)NaCl 2gm bid via NGT as ordered  (2)Bumex 2mg IV bid as ordered  (3)Dose new meds for GFR 10-15  (4)Can d/c ilene Espinoza DNP  [facility]  [phone]      IMPRESSION: 73F w/ HTN, DM2, CVA, breast CA-bilateral mastectomy, recurrent aspiration pneumonia/respiratory failure, and CKD, 8/11/23 p/w acute hypercapnic respiratory failure; c/b RUSS    (1)CKD - stage 4    (2)RUSS - ATN vs AIN -improved relative to a few weeks ago - creatinine plateauing in mid-2s, corresponding with GFR ~10-15ml/min     (3)Hyponatremia - low but slowly improving/stable, on NaCL tabs, IV Bumex, and s/p intermittent 1.5%NS    (4)Pulm- hypercapnic respiratory failure on admission - now s/p trach; remains on vent     (5)ID - on IV Meropenem/Vanco. BCx w/ NGTD    (6)Anemia - Hb low - getting PRBC this a.m.    RECOMMEND:         IMPRESSION: 73F w/ HTN, DM2, CVA, breast CA-bilateral mastectomy, recurrent aspiration pneumonia/respiratory failure, and CKD, 8/11/23 p/w acute hypercapnic respiratory failure; c/b RUSS    (1)CKD - stage 4    (2)RUSS - ATN vs AIN -improved relative to a few weeks ago - creatinine plateauing in mid-2s, corresponding with GFR ~10-15ml/min     (3)Hyponatremia - low but slowly improving/stable, on NaCL tabs, IV Bumex, and s/p intermittent 1.5%NS    (4)Pulm- hypercapnic respiratory failure on admission - now s/p trach; remains on vent     (5)ID - on IV Meropenem/Vanco. BCx w/ NGTD    (6)Anemia - Hb low - getting PRBC this a.m.    RECOMMEND:  (1)Will plan for HD today  (2)NaCl 2gm bid via NGT as ordered  (3)Bumex 2mg IV bid as ordered  (4)Dose new meds for GFR 10-15    D/w Dr. Isaiah Espinoza, NP  [facility]  [phone]            IMPRESSION: 73F w/ HTN, DM2, CVA, breast CA-bilateral mastectomy, recurrent aspiration pneumonia/respiratory failure, and CKD, 8/11/23 p/w acute hypercapnic respiratory failure; c/b RUSS    (1)CKD - stage 4    (2)RUSS - ATN vs AIN -improved relative to a few weeks ago - creatinine plateauing in mid-2s, corresponding with GFR ~10-15ml/min     (3)Hyponatremia - low but slowly improving/stable, on NaCL tabs, IV Bumex, and s/p intermittent 1.5%NS    (4)Pulm- hypercapnic respiratory failure on admission - now s/p trach; remains on vent     (5)ID - on IV Meropenem/Vanco. BCx w/ NGTD    (6)Anemia - Hb low - getting PRBC this a.m.    RECOMMEND:  (1)Will plan for HD today  (2)NaCl 2gm bid via NGT as ordered  (3)Bumex 2mg IV bid as ordered  (4)Dose new meds for GFR 10-15    D/w Dr. Isaiah Espinoza, EMERITA  [facility]  [phone]       RENAL ATTENDING NOTE  Patient seen and examined with NP. Agree with assessment and plan as above.  Spoke with Pulmonary/RCU attending Dr. Edith Lee; patient's mental status 1-2 weeks earlier was better than it is right now. Is the worsening of the AMS due to uremia? Whereas I do not believe so, I cannot say definitively that she does not have uremic encephalopathy. Her hyponatremia could also be contributing to her AMS; this could be well-treated with dialysis. Reasonable to attempt HD daily x next 3 days and monitor for neurologic response. We can maintain the salt tabs and diuretics for now.    Jimmy Colon MD  [facility]  [phone]     IMPRESSION: 73F w/ HTN, DM2, CVA, breast CA-bilateral mastectomy, recurrent aspiration pneumonia/respiratory failure, and CKD, 8/11/23 p/w acute hypercapnic respiratory failure; c/b RUSS    (1)CKD - stage 4    (2)RUSS - ATN vs AIN -improved relative to a few weeks ago - creatinine plateauing in mid-2s, corresponding with GFR ~10-15ml/min     (3)Hyponatremia - low but slowly improving/stable, on NaCL tabs, IV Bumex, and s/p intermittent 1.5%NS    (4)Pulm- hypercapnic respiratory failure on admission - now s/p trach; remains on vent     (5)ID - on IV Meropenem/Vanco. BCx w/ NGTD    (6)Anemia - Hb low - getting PRBC this a.m.    RECOMMEND:  (1)Will plan for HD today  (2)NaCl 2gm bid via NGT as ordered  (3)Bumex 2mg IV bid as ordered  (4)Dose new meds for GFR 10-15    D/w Dr. Isaiah Espinoza, EMERITA  [facility]  [phone]       RENAL ATTENDING NOTE  Patient seen and examined with NP. Agree with assessment and plan as above.  Spoke with Pulmonary/RCU attending Dr. Edith Lee; patient's mental status 1-2 weeks earlier was better than it is right now. Is the worsening of the AMS due to uremia? Whereas I do not believe so, I cannot say definitively that she does not have uremic encephalopathy. Her hyponatremia could also be contributing to her AMS; this could be well-treated with dialysis. Reasonable to attempt HD daily x next 3 days and monitor for neurologic response. We can maintain the salt tabs and diuretics for now. We can attempt 0.5kg UF per treatment. We can minimize risk of osmotic demyelination syndrome by using a relatively low-Na+ bath today and tomorrow; e can opt for a 135meq/L Na+ bath for today and tomorrow, and increase to 140meq/L on Thursday.     Jimmy Colon MD  [facility]  [phone]     IMPRESSION: 73F w/ HTN, DM2, CVA, breast CA-bilateral mastectomy, recurrent aspiration pneumonia/respiratory failure, and CKD, 8/11/23 p/w acute hypercapnic respiratory failure; c/b RUSS    (1)CKD - stage 4    (2)RUSS - ATN vs AIN -improved relative to a few weeks ago - creatinine plateauing in mid-2s, corresponding with GFR ~10-15ml/min     (3)Hyponatremia - low but slowly improving/stable, on NaCL tabs, IV Bumex, and s/p intermittent 1.5%NS    (4)Pulm- hypercapnic respiratory failure on admission - now s/p trach; remains on vent     (5)ID - on IV Meropenem/Vanco. BCx w/ NGTD    (6)Anemia - Hb low - getting PRBC this a.m.    RECOMMEND:  (1)Will plan for HD today  (2)NaCl 2gm bid via NGT as ordered  (3)Bumex 2mg IV bid as ordered  (4)Dose new meds for GFR 10-15    D/w Dr. Isaiah Espinoza, EMERITA  [facility]  [phone]       RENAL ATTENDING NOTE  Patient seen and examined with NP. Agree with assessment and plan as above.  Spoke with Pulmonary/RCU attending Dr. Edith Lee; patient's mental status 1-2 weeks earlier was better than it is right now. Is the worsening of the AMS due to uremia? Whereas I do not believe so, I cannot say definitively that she does not have uremic encephalopathy. Her hyponatremia could also be contributing to her AMS; this could be well-treated with dialysis. Reasonable to attempt HD daily x next 3 days and monitor for neurologic response. We can maintain the salt tabs and diuretics for now. We can attempt 0.5kg UF per treatment. We can minimize risk of osmotic demyelination syndrome by using a relatively low-Na+ bath today and tomorrow; we can opt for a 135meq/L Na+ bath for today and tomorrow, and increase to 140meq/L on Thursday.      counseled at bedside    Jimmy Colon MD  [facility]  [phone]

## 2023-09-19 NOTE — PROGRESS NOTE ADULT - ASSESSMENT
72 y/o F well known to me from my housecal outptn practice. she was admitted at [facility] 7/12-7/22 w aspiration PNA, was treated w CEFEPIME, developed an allergic rash,  dCHF, + MAC on AFB culture, had been progressively getting more and more lethargic and dyspneic at home since DC.   In  am of 8/11/23  ptn presented with respiratory distress w hypoxia and hypercarbia requiring intubation 2/2 volume overload +/- Asp PNA      Neuro   responds appropriately   Baseline MS AOx3, aphasic   - h/o CVA , on aspirin and statin . resumed w feeding tube, ASA resumed 8/26  - eeg  2/2 tremors, no sz focus  - less responsive in the past few days  - MRI 8/17:  new R cerebellar infarct, old left PCA/Occipital infarct. 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 Mcnulty, now in RCU  s/p septic shock overnight 9/1, now on meropenem, HDS  s/p trache, on vent support, copious secretions      Renal   Hyperkalemia with EKG changes  , initially treated w LOKELMA,  now resolved   Ptn well know to Dr. Colon, consult reviewed    HD 8/19,  8/21, 8/26 and 8/28.  s/p PUF 8/29 2.5 Liters, HD 8/30,  9/1, 9/4  started chronic HD 9/7,   cardura resumed  on diuretics  hyponatremic  ptn is less responsive, its possible she is uremic, HD today and tomorrow prob      GI  NPO  on tube feeds  coffee ground emesis x 1 8/24, melena overnight 8/31, s/p 1UPRBC, EGD/Colonoscopy: erosive gastropathy, esophagisits, on colonoscopy old blood seen no active bleeding, poor prep  family to decide re PEG placement    Endocrine  T2DM   A1C 7.1 in 7/2023   - BG goal 140-200     ID   No fever/leukocytosis, recheck temp   Hx latent TB which was treated, no concern for TB   - grew MAC on AFB culture from most recent admission. at [facility]  -MSSA Bacteremia 9/1, allergic to cephalosprins and PCN, on Vanco as per ID, renal dosing,   - sputum also grew E.Coli-ESBL, as per ID recs for  Bradford through 9/7( 1 week course as per ID) , afebrile.  - 9/8:  spike  temp 38.1C, has O. externa, has a wick, recs are to get a CT to R/O an abscess/bony involvement. cont Meropenem  9/9: ptn w fever overnight to 100.8,  may need shiley pulled if bacteremic, needs NECK CT as per ENT to R/O Mastoid bone involvement while having ongoing purulent DC from L ear 2/2 O. externa, getting daily wick changes  9/10:  spiked 102 overnight through Bradford and Vanco, purulent DC from O. externa, ENT recommend Ct of mastoid. ptn in HD, has Shiley in place, consider pulling shiley and send for Cx. would d/w Renal, and consider contacting  ID, last seen by Dr. Conner 9/6 9/11: remains febrile, Dr. Conner reconsulted. cont Bradford, Vanco  9/12: tmax 100.5, fever curve is improving, awaiting Left ear CT, on Bradford since 9/1. on  vanco for MSSA Bacteremia, does not tolerate cephalosporins. through 10/2  9/13: on full vent support via trache, appears uncomfortable and onur 2/2 Left O. externa. has an incidental left middle ear tumor, no acute middle ear interventions recommended, left ear wick replaced. on Meropenem, cx from Ear DC is neg. On Vanco for MSSA bacteremia through 10/2, course of Meropenem as per ID, afebrile in the past 24 hrs . Cardura for HTN resumed, HGB dropped to 6.4, got a dose of EPO and 1UPRBC, rpt 7.8, remains on HEPARIN drip for LEFT popliteal DVT  9/14: cont on Mupirocin locally to Left ear, cont IV Bradford, ENT signed off, afebrile x 48 hrs, Mro course to be determined by ID, on Vanco for MSSA bacteremia through 10/2. ongoing worsening hyponatremia, Hypertonic saline as per renal, no HD today, s/p 1UPRBC 9/13  9/15: spiking temps again, if cont to spike as per ID re PAN scan. cont Vanco for MSSA Bacteremia  9/16-18: no temp overnight      Heme/Onc  ppx: place on SCD  Transfuse for hgb 6.4, no obv bleed. HDS  LEFT POPLITEAL VEIN ACUTE DVT on 9/10    Ethics  GOC - Discussed GOC with daughter and , they have opted in the past for full code. and she remains full code at present

## 2023-09-19 NOTE — PROGRESS NOTE ADULT - SUBJECTIVE AND OBJECTIVE BOX
Follow Up:  MSSA bacteremia    Interval History: no more fever but sleeping all day, Hgb dropped also, getting PRBC    ROS:    Unobtainable because: trached        Allergies  isoniazid (Rash)  nafcillin (Unknown)  hydrALAZINE (Rash)  vitamin E (Short breath; Urticaria; Hives)  doxycycline (Rash)  cefepime (Rash)  NIFEdipine (Urticaria; Hives)        ANTIMICROBIALS:  meropenem  IVPB 500 every 12 hours      OTHER MEDS:  acetaminophen   Oral Liquid .. 650 milliGRAM(s) Oral every 6 hours PRN  albuterol/ipratropium for Nebulization 3 milliLiter(s) Nebulizer every 6 hours  atorvastatin 40 milliGRAM(s) Oral at bedtime  buMETAnide Injectable 2 milliGRAM(s) IV Push two times a day  chlorhexidine 0.12% Liquid 15 milliLiter(s) Oral Mucosa every 12 hours  chlorhexidine 2% Cloths 1 Application(s) Topical daily  dextrose 5%. 1000 milliLiter(s) IV Continuous <Continuous>  dextrose 5%. 1000 milliLiter(s) IV Continuous <Continuous>  dextrose 50% Injectable 12.5 Gram(s) IV Push once  dextrose 50% Injectable 25 Gram(s) IV Push once  dextrose 50% Injectable 25 Gram(s) IV Push once  dextrose Oral Gel 15 Gram(s) Oral once PRN  doxazosin 6 milliGRAM(s) Oral <User Schedule>  epoetin odalis (EPOGEN) Injectable 33577 Unit(s) SubCutaneous <User Schedule>  ferrous    sulfate Liquid 300 milliGRAM(s) Enteral Tube daily  glucagon  Injectable 1 milliGRAM(s) IntraMuscular once  heparin  Infusion 1000 Unit(s)/Hr IV Continuous <Continuous>  insulin lispro (ADMELOG) corrective regimen sliding scale   SubCutaneous every 6 hours  insulin NPH human recombinant 3 Unit(s) SubCutaneous every 6 hours  mupirocin 2% Ointment 1 Application(s) Both Nostrils two times a day  pantoprazole  Injectable 40 milliGRAM(s) IV Push two times a day  psyllium Powder 1 Packet(s) Oral daily  sodium chloride 2 Gram(s) Oral two times a day  sodium chloride 1.5%. 500 milliLiter(s) IV Continuous <Continuous>  sodium chloride 3%  Inhalation 4 milliLiter(s) Inhalation every 6 hours      Vital Signs Last 24 Hrs  T(C): 36.8 (19 Sep 2023 09:35), Max: 36.9 (18 Sep 2023 18:09)  T(F): 98.2 (19 Sep 2023 09:35), Max: 98.4 (18 Sep 2023 18:09)  HR: 103 (19 Sep 2023 10:46) (90 - 104)  BP: 103/48 (19 Sep 2023 09:35) (101/80 - 151/75)  BP(mean): --  RR: 20 (19 Sep 2023 09:35) (20 - 23)  SpO2: 99% (19 Sep 2023 10:46) (94% - 100%)    Parameters below as of 19 Sep 2023 09:50  Patient On (Oxygen Delivery Method): ventilator        Physical Exam:  General:    trached   ENT: L with improved edema, whitish crusted drainage  Respiratory:   trach on vent  abd:   soft, not tender  :     no CVAT, no suprapubic tenderness, no willard  Musculoskeletal : no joint swelling  Skin:    no rash now  vascular: [facility] ilene                        6.8    4.33  )-----------( 166      ( 19 Sep 2023 06:00 )             21.4       09-19    124<L>  |  90<L>  |  103<H>  ----------------------------<  231<H>  4.3   |  23  |  2.64<H>    Ca    8.4      19 Sep 2023 06:00  Phos  3.6     09-19  Mg     2.50     09-19        Urinalysis Basic - ( 19 Sep 2023 06:00 )    Color: x / Appearance: x / SG: x / pH: x  Gluc: 231 mg/dL / Ketone: x  / Bili: x / Urobili: x   Blood: x / Protein: x / Nitrite: x   Leuk Esterase: x / RBC: x / WBC x   Sq Epi: x / Non Sq Epi: x / Bacteria: x        MICROBIOLOGY:  v  Ear Ear  09-13-23   Moderate Candida albicans "Susceptibilities not performed"  --  --      .Blood Blood-Venous  09-10-23   No growth at 5 days  --  --      .Sputum Sputum  09-10-23   Normal Respiratory Cate present  --    Rare polymorphonuclear leukocytes per low power field  No Squamous epithelial cells per low power field  Rare Yeast like cells seen per oil power field  Rare Gram Positive Cocci in Clusters seen per oil power field      .Blood Blood-Peripheral  09-10-23   No growth at 5 days  --  --      .Sputum Sputum  09-08-23   Normal Respiratory Cate present  --    Numerous polymorphonuclear leukocytes per low power field  Numerous Squamous epithelial cells per low power field  Few Yeast like cells per oil power field  Results consistent with oropharyngeal contamination      .Blood Blood-Peripheral  09-08-23   No growth at 5 days  --  --      Ear Ear  09-06-23   No growth at 5 days  --  --      .Blood Blood-Peripheral  09-04-23   No growth at 5 days  --  --      .Bronchial Bronchial Lavage  09-01-23   Numerous Staphylococcus aureus Multiple Morphological Strains  Normal Respiratory Cate present  --  Staphylococcus aureus  Staphylococcus aureus      .Blood Blood-Peripheral  09-01-23   Growth in aerobic and anaerobic bottles: Staphylococcus aureus  Direct identification is available within approximately 3-5  hours either by Blood Panel Multiplexed PCR or Direct  MALDI-TOF. Details: [email or web address]  Growth in anaerobic bottle: blood culture bottle turned positive after  the final report was released.  --  Blood Culture PCR  Staphylococcus aureus      ET Tube ET Tube  09-01-23   Moderate Staphylococcus aureus  Moderate Escherichia coli ESBL  Normal Respiratory Cate present  --  Staphylococcus aureus  Escherichia coli ESBL                RADIOLOGY:  Images independently visualized and reviewed personally, findings as below  < from: CT Internal Auditory Canals No Cont (09.12.23 @ 17:13) >  IMPRESSION:    1. Acute on chronic left-sided otitis externa and acute on chronic   left-sided otomastoiditis. 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 Superimposed cholesteatoma formation within the left   tympanomastoid cavity cannot be excluded. No evidence of malignant otitis   externa.    2. Chronic right-sided mastoiditis.    < end of copied text >

## 2023-09-19 NOTE — PROGRESS NOTE ADULT - NS ATTEND AMEND GEN_ALL_CORE FT
Pt is a 73F with PMHx IDDMII, CKD, hx CVA, CHFpEF, latent TB (s/p tx,) hx breast cancer (2018, s/p mastectomy and adjuvant CT/RT), and hx CVA s/p trach/PEG (now decannulated) initially presenting to [facility] on 8/11/23 with acute hypoxemic and hypercapnic respiratory failure s/p ETT intubation/MV and ARF with pulmonary edema c/b HTN emergency requiring nicardipine drip transferred to MICU for further management.     Pt initially p/w worsening RUE shaking and dysconjugate gaze with MRI 8/17 (+) new right cerebellar, midbrain, and multiple tiny embolic infarcts as well as known left PCA CVA. EEG (-). 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 ILR in place from prior CVA evaluation, last interrogated 9/7 without AF. At baseline, pt reportedly wheelchair bound with RUE tremors and some ADL assistance. Pt was unable to tolerate ASA 2/2 GIB, will re-attempt once more stable. Worsening mentation this weekend possibly 2/2 metabolic encephalopathy in the setting of hyponatremia and uremia. No new focal deficits noted.     Pt with acute hypoxemic and hypercapnic respiratory failure s/p ETT intubation/MV possibly 2/2 flash pulmonary edema in the setting of HTN emergency +/- aspiration event. Pt with failure of ventilatory weaning now s/p tracheostomy placement (IP 9/1.) Of note, pt also noted to have evidence of midbrain CVA with possible concern for central effect on respiratory drive, will monitor carefully and continue weaning trials as tolerated. Pt able to spontaneously breath intermittently, will continue to attempt further weaning trials. Airway clearance therapy in place. Wean O2 supplementation for goal O2 saturation 90-95%. Oral hygiene and aspiration precautions in place. Trach care and suctioning as per RCU team.     Pt p/w ARF on CKD requiring initiation of HD 2/2 pulmonary edema and metabolic acidosis with metabolic encephalopathy. No urgent indication for HD today, further HD as per nephrology team. Finished prednisone taper for possible AIN. Strict I/Os, pt makes small amount of urine. Acute hypervolemic hyponatremia in the setting of renal failure now resolving despite hypertonic saline, diuretics, and salt tabs now also with worsening uremia. Renal following, recs appreciated. Will perform daily HD via right Shiley for the next few days and re-assess changes to mental status.     Hospital course further c/b recurrent infections, most recently with MSSA bacteremia (9/1, cleared 9/4 and 9/8) with (+) MSSA and ESBL EColi in BAL as well. Pt with possible cefepime vs. cefazolin drug-induced rash followed by transition to vancomycin. Was then found to have left otitis externa (9/5) now requiring serial bedside debridement with worsening necrotic tissue/erythema for which she was also initiated on meropenem. CTTB pending, family refusing contrast, non-contrast study obtained with evidence of bone involvement but no indication for further surgical intervention. Cultures NTD, pt remains febrile intermittently. Will continue with meropenem, CT imaging does not show significant enough bone involvement to require prolonged course for osteomyelitis. However, pt with new white unclear drainage from the ear x2 days. Will re-consult ENT. Ciprodex otic drops x10 day course now completed. Plan to complete vancomycin by level x4 week course through 10/2 (given inability to exclude endocarditis). Discussed with ID, recs appreciated.     Pt also with oropharyngeal dysphagia requiring NGTs followed by UGIB s/p EGD (8/31) found to have esophagitis/erosive gastropathy now on PPI BID. Pt further found to have popliteal DVT. No c/i by GI for A/C, pt initiated on heparin drip with patient-specific pTTs. Will need to ultimately transition to Coumadin x3-6 months after PEG placement.      Dispo pending medical optimization. Pt full code. DVT ppx full A/C with heparin drip. Discussed with pt's family ( and daughter) at bedside daily.

## 2023-09-19 NOTE — PROGRESS NOTE ADULT - ASSESSMENT
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 family declined renal biopsy started on prednisone  s/p trach 9/1 and BAL with MSSA   ET cx with ESBL and MSSA  started on ana cristina 9/1  blood cx 9/1: MSSA   repeat negative 9/4, 9/8  CXR with b/l opacities, R increased  L ear edema and otitis externa    initial  resp failure for ?fluid ovrer load but also had MSSA in the sputum, got vanco, aztreonam one day then cefepime and had rash, renal failure which was considered due to cefepime and then received 2 days of cefazolin and then off, now with trach and bronc on 9/1 with MSSA bacteremia and BAL also MSSA  another ET cx with MSSA and ESBL E-coli  hypotension, MSSA bacteremia likely due to pneumonia, repeat blood cx negative 9/4, TTE limited unable to exclude endocarditis  L otitis externa on ana cristina since 9/1 but worsening edema and black discoloration with bullae forming and persistent fever (ear cx was negative)  Ct showed acute on chronic otitis externa and otomastoiditis , superimposed cholesteatoma can not be excluded, no malignant otitis externa  pt again febrile while ear has improved  * if fever again, would do chest/abd/pelvis CT with contrast  * s/p debridement of necrotic tissue by ENT, cx with candida albicans likely just skin richa, has some crusted white drainage would f/u with ENT  * c/w ana cristina, started 9/1  * cannot do cefazolin so will have to treat with vanco  * c/w vanco per level, has been of HD but again plan fo rHD  * will need a 4 week course of vanco for the MSSA bacteremia from the negative blood cx through 10/2  * monitor CBC/diff and renal function    The above assessment and plan was discussed with the primary team    Yumiko Conner MD  contact on teams  After 5pm and on weekends call [phone]

## 2023-09-19 NOTE — PROGRESS NOTE ADULT - ASSESSMENT
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 While in MICU patient noted with worsening renal failure requiring HD initiation, CGE/ Melena s/p EGD 8/31 found with esophagitis and erosive gastropathy, new right cerebellar infarct and fevers second to ESBL COLI and MSSA VAP, MSSA bacteremia, left ear otitis externa and drug rxn to cephalosporins Course further complicated by prolonged vent time s/p tracheostomy 9/1 and transferred to RCU 9/3 completed by recurrent fevers with high PIP, respiratory acidosis and concern for volume overloaded.     NEUROLOGY  # Metabolic Encephalopath vs NEW cerebella infarct   - Baseline MS AOX3 with short term memory changes per family however noted with concern for poor mentation in ICU  - MRI (8/17) with NEW right cerebellar infarct and old left PCA/occipital infarcts  - STEPHANIE (8/17) with AV showing two linear mobile echogenic elements attached to valve leaflets consistent with Lambel's excrescence, but no TRINITY thrombus, and lambel's excrescence with uncertain clinical implication.   - EEG (8/11-8/15) with no seizures   - ILR interrogation (9/7) with SR and PACs falsely recorded as AF.   - Attempted to resume ASA for medical management but held given GIB   - Continue on Lipitor for medical management   - Course complicated by questionable head and body shaking 9/8 however prolactin elevated and shakes head yes/no to some questions.   - Lethargy more likely second to respiratory acidosis and will hold on RPT EEG    - Monitor mentation closely Awakens to verbal stimuli     CARDIOVASCULAR  # HTN Urgency   # Septic vs vasoplegic shock   - Labile BPs ranging in between SBP 80s-200s and attempted labetalol, however noted with hypotension and bradycardia likely from BB toxicity vs shock state?    - Holding Labetalol and Catapres   - c/w Cardura for now   - Hypotensive in the 80s systolic overnight, requiring Midodrine 20mg x 1    # Hx of HFpEF with likely ADHF with volume overload.   - ECHO (7/2023) with EF 59 with severe LVH and diastolic dysfunction   - STEPHANIE (8/18) with normal LVRVSF however noted with increased LA pressures and persistent LA septal bowing into RA.   - ECHO (8/11) with EF 60 with mild LVH, normal LVRVSF, and mild ddfxn.  - Remove volume with HD sessions and intermittent bumex pushes as BP allows    - c/w Bumex 2mg IV BID for now - Monitor UOP and electrolytes     HEENT   # Left ear OE   - ENT evaluation (9/7) with no mastoid tenderness, however found with positive swelling and excoriation to left auricle and tenderness to manipulation of auricle. Debrided crusting of auricle and EAC evaluated with edema and unable to visualize TM. EAC debrided and found with watery exudate.   - Continue on CiproHC (9/5 - 9/16)   - Continue on Bradford (9/1 - ) as below   - ENT following and ear wick change QD   - Wick out but needs ? Debridement wound care called/fu ent   - ENT recommending CT IAC w/ IV con but family is refusing as concerned given CKD, kidneys wouldn't recover from IV contrast. Spoke with Nephrology, ENT, and patient's  at length. Planned for CT non con and per , decision will be made from there but for now doesn't want to risk further harming kidneys.  - CT IAC showing acute on chronic left-sided otitis externa and acute on chronic left-sided otomastoiditis. Superimposed left-sided tympanosclerosis. Superimposed cholesteatoma formation within the left   tympanomastoid cavity cannot be excluded. No evidence of malignant otitis externa.    # Oral Lesions with questionable Zoster vs Trauma?   - Patient with tongue pain and seen with anterior ulcerations consolidating and crusting and posterior ulceration.  - Case discussed with pathology resident and culture/ cytology sent.   - HSV negative and Path (9/6) with normal appearing epithelial cells singly and in clusters devoid of viral cytopathic effect.   - Continue on magic mouth wash for pain    RESPIRATORY  # AHHRF second to volume overload   - Hx of CKD and HFpEF presented with SOB and hypercarbia second to volume overload requiring intubation and HD initiation   - Vent weaning attempted however limited requiring tracheostomy (9/1) with IP   - Course complicated by PIPs elevated (50s) and respiratory acidosis second to secretions vs volume ?    - Vent adjusted 20/300/5/30 without improvement.   - POCUS (9/8) with BL pleural effusions (large on right and small on left)   - CT CHEST (9/8) with TBM, BL pleural effusions and continued consolidations   - Continue on vent and given TBM increased PEEP (9/9) to 20/300/8/40 with overall improvement   - Continue on Duoneb and HTS for airway clearance   - Continue volume removal with diuresis and aggressive UF sessions.   - Discussing possible thora but appears improved and will monitor.  ]  - Bedside US showing decrease in pleural effusion - hold off thoracentesis 9/10     GI  # UGIB   - CGE x 1 episode on 8/24 and melena x 2 episodes on 8/31 likely residual blood.   - EGD (8/31) found with esophagitis and erosive gastropathy  - Continue on PPI BID through late October and then PPI QD   - No melena     # Dysphagia   - NGT-TF continued   - Pending PEG however needs improvement in infectious status prior to placement.     RENAL  # Progressive CKD   - Presented volume overload and progressive RUSS on CKD (baseline CRE in 2s and mode into the 3s on admission) likely obstruction vs low flow state with shock vs AIN from drug reaction issue?  - POCUS with no signs of hydronephrosis   - Pressor support started with improvement in renal function  - Attempted steroid course in ICU without improvement in renal function   - Case discussed at length with renal and initiated on HD in ICU and now continued on HD TTHS, however remains volume overloaded.   - Patient oliguric however responds to Bumex pushes   - Nephro following - will continue to hold off on HD at this time  - Monitor renal function and UOP, and electrolytes for now  - Urinary retention overnight, s/p Straight cath x 1 with close to over half a litter in bladder    Hyponatremia (improving) Na 123>>125  - c/w Salt tabs - s/p Hypertonic 1.5%NS @ 50cc/hr x 5 hr  - Renal- holding off on HD for now      # Hyperphosphatemia (resolved)   - PTH normal and elevated phos likely from renal failure  - s/p Phos binder with Renvela - now d'beth as level wnl      INFECTIOUS DISEASE  # ESBL ECOLI and MSSA VAP c/b MSSA bacteremia   - RVP/ COVID (8/11) negative   - SCx (8/11) with MSSA s/p cefepime (8/12-8/13) and then ancef (8/14) however noted with drug reaction and then changed to vanco and aztreonam (8/12-8/13) in ICU .   - Course complicated by recurrent fevers and return of MSSA with bacteremia.   - SCx (9/1) with MSSA and ESBL ECOLI   - BAL (9/1) with MSSA   - BCx (9/1) with MSSA and cleared on (9/4)   - ECHO (9/6) unable to rule out endocarditis   - Continue on Brafdord (9/4 - ) and Vanco BY DOSE POST HD (9/4, 9/7, 9/9 ...) with duration TBD at this time.   - Given inability to rule out endocarditis will discuss possible 4 wks with ID?   - Course complicated by fever (9/7) and UA with leuks but no bacteria, however compared to prior improved, RVP/COVID and SCx negative, and RPT CXR similar to prior   - Pending BCx, SCx, UCx, and LE DOPPLERS- Blood and sputum cx NTD; Acute left deep vein thrombosis of the left popliteal vein.  - Febrile overnight 102 recultured and sent off   -Pt receiving vanco post HD however today given vanco 750mg x 1 will check Vanco level at end of HD session and dose     # Left ear OE   - ENT evaluation (9/7) with no mastoid tenderness, however found with positive swelling and excoriation to left auricle and tenderness to manipulation of auricle. Debrided crusting of auricle and EAC evaluated with edema and unable to visualize TM. EAC debrided and found with watery exudate.   - Continue on CiproHC (9/5 - )   - Continue on Bradford (9/1 - ) as below     # MRSA PCRs   - MRSA PCR (8/11 and 8/14) negative   - Next MRSA + PCR ordered for 10/8     HEME  # Anemia second to GIB vs renal disease   - Hold chemical DVT PPX given bleeding/ waxing and waning HH   - s/p multiple units of PRBCs (8/15, 8/21, 8/28, 8/31 and 9/8)   - Anemia panel with AOCD but with low %sat and ferrous sulfate added to optimize   - Monitor HH and discuss with renal for EPO sometime next week.   - Spoke with GI for clearance to start Heparin gtt for + DVT     Vascular  # DVT  - Pt with + popliteal DVT on SCD Spoke with GI no absolute contraindication for starting Heparin - advise close monitoring for bleeding - Do stat CTA if bleeding occurs and call IR   - Pt specific heparin gtt started with goal PTT 60-85  - will monitor closely     ONC   # Hx of Breast CA   - Patient dx in 2018 and s/p BL mastectomy (radical on right) and chemo and RT.   - Supportive care.     ENDOCRINE  # IDDM2   - Continue on NPH 3U and ISS   - Monitor FS and adjust as needed     LINES/ TUBES  - R ARTHUR TAYLOR (8/19 - )     SKIN  # Drug Eruption   - Attempted cefepime (8/12) vs ancef (8/13-8/14) and then noted with drug rxn.   - Hold further cephalosporins at this time   - s/p Steroids with prednisone 40 (8/18 - 9/2) then prednisone 30 (9/3-9/7), then prednisone 20 (9/8 - 9/10), then prednisone 10mg (9/11-9/13) then turn off.     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 While in MICU patient noted with worsening renal failure requiring HD initiation, CGE/ Melena s/p EGD 8/31 found with esophagitis and erosive gastropathy, new right cerebellar infarct and fevers second to ESBL COLI and MSSA VAP, MSSA bacteremia, left ear otitis externa and drug rxn to cephalosporins Course further complicated by prolonged vent time s/p tracheostomy 9/1 and transferred to RCU 9/3 completed by recurrent fevers with high PIP, respiratory acidosis and concern for volume overloaded.     NEUROLOGY  # Metabolic Encephalopath vs NEW cerebella infarct   - Baseline MS AOX3 with short term memory changes per family however noted with concern for poor mentation in ICU  - MRI (8/17) with NEW right cerebellar infarct and old left PCA/occipital infarcts  - STEPHANIE (8/17) with AV showing two linear mobile echogenic elements attached to valve leaflets consistent with Lambel's excrescence, but no TRINITY thrombus, and lambel's excrescence with uncertain clinical implication.   - EEG (8/11-8/15) with no seizures   - ILR interrogation (9/7) with SR and PACs falsely recorded as AF.   - Attempted to resume ASA for medical management but held given GIB   - Continue on Lipitor for medical management   - Course complicated by questionable head and body shaking 9/8 however prolactin elevated and shakes head yes/no to some questions.   - Lethargy noted, most likely related to rising BUN    CARDIOVASCULAR  # HTN Urgency   # Septic vs vasoplegic shock   - Labile BPs ranging in between SBP 80s-200s and attempted labetalol, however noted with hypotension and bradycardia likely from BB toxicity vs shock state?    - Holding Labetalol and Catapres   - c/w Cardura for now   - Hypotensive in the 80s systolic overnight, requiring Midodrine 20mg x 1 (resolved)   - Stable today, still mildly hypotensive, but no intervention at this time     # Hx of HFpEF with likely ADHF with volume overload.   - ECHO (7/2023) with EF 59 with severe LVH and diastolic dysfunction   - STEPHANIE (8/18) with normal LVRVSF however noted with increased LA pressures and persistent LA septal bowing into RA.   - ECHO (8/11) with EF 60 with mild LVH, normal LVRVSF, and mild ddfxn.  - Remove volume with HD sessions and intermittent bumex pushes as BP allows    - c/w Bumex 2mg IV BID for now - Monitor UOP and electrolytes     HEENT   # Left ear OE   - ENT evaluation (9/7) with no mastoid tenderness, however found with positive swelling and excoriation to left auricle and tenderness to manipulation of auricle. Debrided crusting of auricle and EAC evaluated with edema and unable to visualize TM. EAC debrided and found with watery exudate.   - s/p CiproHC (9/5 - 9/16)   - Continue on Bradford (9/1 - ) as below   - ENT following and ear wick change QD   - Wick out but needs ? Debridement wound care called/fu ent   - ENT recommending CT IAC w/ IV con but family is refusing as concerned given CKD, kidneys wouldn't recover from IV contrast. Spoke with Nephrology, ENT, and patient's  at length. Planned for CT non con and per , decision will be made from there but for now doesn't want to risk further harming kidneys.  - CT IAC showing acute on chronic left-sided otitis externa and acute on chronic left-sided otomastoiditis. Superimposed left-sided tympanosclerosis. Superimposed cholesteatoma formation within the left   tympanomastoid cavity cannot be excluded. No evidence of malignant otitis externa.    # Oral Lesions with questionable Zoster vs Trauma?   - Patient with tongue pain and seen with anterior ulcerations consolidating and crusting and posterior ulceration.  - Case discussed with pathology resident and culture/ cytology sent.   - HSV negative and Path (9/6) with normal appearing epithelial cells singly and in clusters devoid of viral cytopathic effect.   - Continue on magic mouth wash for pain    RESPIRATORY  # AHHRF second to volume overload   - Hx of CKD and HFpEF presented with SOB and hypercarbia second to volume overload requiring intubation and HD initiation   - Vent weaning attempted however limited requiring tracheostomy (9/1) with IP   - Course complicated by PIPs elevated (50s) and respiratory acidosis second to secretions vs volume ?    - Vent adjusted 20/300/5/30 without improvement.   - POCUS (9/8) with BL pleural effusions (large on right and small on left)   - CT CHEST (9/8) with TBM, BL pleural effusions and continued consolidations   - Continue on vent and given TBM increased PEEP (9/9) to 20/300/8/40 with overall improvement   - Continue on Duoneb and HTS for airway clearance   - Continue volume removal with diuresis and aggressive UF sessions.   - Discussing possible thora but appears improved and will monitor.  ]  - Bedside US showing decrease in pleural effusion - hold off thoracentesis 9/10     GI  # UGIB   - CGE x 1 episode on 8/24 and melena x 2 episodes on 8/31 likely residual blood.   - EGD (8/31) found with esophagitis and erosive gastropathy  - Continue on PPI BID through late October and then PPI QD   - No melena     # Dysphagia   - NGT-TF continued   - Pending PEG however needs improvement in infectious status prior to placement.     RENAL  # Progressive CKD   - Presented volume overload and progressive RUSS on CKD (baseline CRE in 2s and mode into the 3s on admission) likely obstruction vs low flow state with shock vs AIN from drug reaction issue?  - POCUS with no signs of hydronephrosis   - Pressor support started with improvement in renal function  - Attempted steroid course in ICU without improvement in renal function   - Case discussed at length with renal and initiated on HD in ICU and now continued on HD TTHS, however remains volume overloaded.   - Patient oliguric however slightly responding to Bumex pushes - does not make enough urine   - Nephro following - Plan to resume HD TIW   - Monitor renal function and UOP, and electrolytes for now  - Monitor urine output - Will perform bladder scan/straight cath as needed if retaining urine     Hyponatremia (improving) Na 123>>125  - c/w Salt tabs - s/p Hypertonic 1.5%NS @ 50cc/hr x 5 hr  - Renal- Will resume HD i/s/o of rising BUN, poor urine output on Bumex IVP     # Hyperphosphatemia (resolved)   - PTH normal and elevated phos likely from renal failure  - s/p Phos binder with Renvela - now d'beth as level wnl      INFECTIOUS DISEASE  # ESBL ECOLI and MSSA VAP c/b MSSA bacteremia   - RVP/ COVID (8/11) negative   - SCx (8/11) with MSSA s/p cefepime (8/12-8/13) and then ancef (8/14) however noted with drug reaction and then changed to vanco and aztreonam (8/12-8/13) in ICU .   - Course complicated by recurrent fevers and return of MSSA with bacteremia.   - SCx (9/1) with MSSA and ESBL ECOLI   - BAL (9/1) with MSSA   - BCx (9/1) with MSSA and cleared on (9/4)   - ECHO (9/6) unable to rule out endocarditis   - Continue on Bradford (9/4 - ) and Vanco BY DOSE POST HD (9/4, 9/7, 9/9 ...) with duration TBD at this time.   - Given inability to rule out endocarditis will discuss possible 4 wks with ID?   - Course complicated by fever (9/7) and UA with leuks but no bacteria, however compared to prior improved, RVP/COVID and SCx negative, and RPT CXR similar to prior   - Pending BCx, SCx, UCx, and LE DOPPLERS- Blood and sputum cx NTD; Acute left deep vein thrombosis of the left popliteal vein.  - Febrile overnight 102 recultured and sent off   -Pt receiving vanco post HD however today given vanco 750mg x 1 will check Vanco level at end of HD session and dose     # Left ear OE   - ENT evaluation (9/7) with no mastoid tenderness, however found with positive swelling and excoriation to left auricle and tenderness to manipulation of auricle. Debrided crusting of auricle and EAC evaluated with edema and unable to visualize TM. EAC debrided and found with watery exudate.   - s/p CiproHC (9/5 - 9/16)   - Continue on Bradford (9/1 - ) as below     # MRSA PCRs   - MRSA PCR (8/11 and 8/14) negative   - Next MRSA + PCR ordered for 10/8     HEME  # Anemia second to GIB vs renal disease   - Hold chemical DVT PPX given bleeding/ waxing and waning HH   - s/p multiple units of PRBCs (8/15, 8/21, 8/28, 8/31 and 9/8)   - Anemia panel with AOCD but with low %sat and ferrous sulfate added to optimize   - Monitor HH and discuss with renal for EPO sometime next week.   - Spoke with GI for clearance to start Heparin gtt for + DVT     Vascular  # DVT  - Pt with + popliteal DVT on SCD Spoke with GI no absolute contraindication for starting Heparin - advise close monitoring for bleeding - Do stat CTA if bleeding occurs and call IR   - Pt specific heparin gtt started with goal PTT 60-85  - will monitor closely     ONC   # Hx of Breast CA   - Patient dx in 2018 and s/p BL mastectomy (radical on right) and chemo and RT.   - Supportive care.     ENDOCRINE  # IDDM2   - Continue on NPH 3U and ISS   - Monitor FS and adjust as needed     LINES/ TUBES  - ANDREIA TAYLOR (8/19 - )     SKIN  # Drug Eruption   - Attempted cefepime (8/12) vs ancef (8/13-8/14) and then noted with drug rxn.   - Hold further cephalosporins at this time   - s/p Steroids with prednisone 40 (8/18 - 9/2) then prednisone 30 (9/3-9/7), then prednisone 20 (9/8 - 9/10), then prednisone 10mg (9/11-9/13) then turn off.     ETHICS/ GOC    - FULL CODE     DISPO - TBD

## 2023-09-19 NOTE — PROGRESS NOTE ADULT - SUBJECTIVE AND OBJECTIVE BOX
Subjective: Patient seen and examined. No new events except as noted.     REVIEW OF SYSTEMS:  Unable to obtain       MEDICATIONS:  MEDICATIONS  (STANDING):  albuterol/ipratropium for Nebulization 3 milliLiter(s) Nebulizer every 6 hours  atorvastatin 40 milliGRAM(s) Oral at bedtime  buMETAnide Injectable 2 milliGRAM(s) IV Push two times a day  chlorhexidine 0.12% Liquid 15 milliLiter(s) Oral Mucosa every 12 hours  chlorhexidine 2% Cloths 1 Application(s) Topical daily  dextrose 5%. 1000 milliLiter(s) (100 mL/Hr) IV Continuous <Continuous>  dextrose 5%. 1000 milliLiter(s) (50 mL/Hr) IV Continuous <Continuous>  dextrose 50% Injectable 25 Gram(s) IV Push once  dextrose 50% Injectable 25 Gram(s) IV Push once  dextrose 50% Injectable 12.5 Gram(s) IV Push once  doxazosin 6 milliGRAM(s) Oral <User Schedule>  epoetin odalis (EPOGEN) Injectable 68749 Unit(s) SubCutaneous <User Schedule>  ferrous    sulfate Liquid 300 milliGRAM(s) Enteral Tube daily  glucagon  Injectable 1 milliGRAM(s) IntraMuscular once  heparin  Infusion 1000 Unit(s)/Hr (9.5 mL/Hr) IV Continuous <Continuous>  insulin lispro (ADMELOG) corrective regimen sliding scale   SubCutaneous every 6 hours  insulin NPH human recombinant 3 Unit(s) SubCutaneous every 6 hours  meropenem  IVPB 500 milliGRAM(s) IV Intermittent every 12 hours  mupirocin 2% Ointment 1 Application(s) Both Nostrils two times a day  pantoprazole  Injectable 40 milliGRAM(s) IV Push two times a day  psyllium Powder 1 Packet(s) Oral daily  sodium chloride 2 Gram(s) Oral two times a day  sodium chloride 1.5%. 500 milliLiter(s) (50 mL/Hr) IV Continuous <Continuous>  sodium chloride 3%  Inhalation 4 milliLiter(s) Inhalation every 6 hours      PHYSICAL EXAM:  T(C): 36.8 (09-19-23 @ 09:35), Max: 36.9 (09-18-23 @ 18:09)  HR: 103 (09-19-23 @ 10:46) (90 - 104)  BP: 103/48 (09-19-23 @ 09:35) (101/80 - 151/75)  RR: 20 (09-19-23 @ 09:35) (20 - 23)  SpO2: 99% (09-19-23 @ 10:46) (94% - 100%)  Wt(kg): --  I&O's Summary    18 Sep 2023 07:01  -  19 Sep 2023 07:00  --------------------------------------------------------  IN: 1268 mL / OUT: 300 mL / NET: 968 mL            Appearance: NAD, +trach  HEENT: dry oral mucosa  Lymphatic: No lymphadenopathy  Cardiovascular: Normal S1 S2, No JVD, No murmurs, No edema  Respiratory: Decreased BS, + trach to vent	  Neuro: opens eyes  Gastrointestinal: Soft, Non-tender, + BS, + NGT  Skin: No rashes, No ecchymoses, No cyanosis	  Extremities: No strength/ROM 2/2 sedation, + BL LE edema  Vascular: Peripheral pulses palpable 2+ bilaterally            LABS:    CARDIAC MARKERS:                                6.8    4.33  )-----------( 166      ( 19 Sep 2023 06:00 )             21.4     09-19    124<L>  |  90<L>  |  103<H>  ----------------------------<  231<H>  4.3   |  23  |  2.64<H>    Ca    8.4      19 Sep 2023 06:00  Phos  3.6     09-19  Mg     2.50     09-19      proBNP:   Lipid Profile:   HgA1c:   TSH:             TELEMETRY: 	    ECG:  	  RADIOLOGY:   DIAGNOSTIC TESTING:  [ ] Echocardiogram:  [ ]  Catheterization:  [ ] Stress Test:    OTHER:

## 2023-09-19 NOTE — PROGRESS NOTE ADULT - SUBJECTIVE AND OBJECTIVE BOX
Patient is a 73y old  Female who presents with a chief complaint of Respiratory distress (19 Sep 2023 13:28)      SUBJECTIVE / OVERNIGHT EVENTS: ptn is less responsive, its possible she is uremic, HD today and tomorrow prob    MEDICATIONS  (STANDING):  albuterol/ipratropium for Nebulization 3 milliLiter(s) Nebulizer every 6 hours  atorvastatin 40 milliGRAM(s) Oral at bedtime  buMETAnide Injectable 2 milliGRAM(s) IV Push two times a day  chlorhexidine 0.12% Liquid 15 milliLiter(s) Oral Mucosa every 12 hours  chlorhexidine 2% Cloths 1 Application(s) Topical daily  dextrose 5%. 1000 milliLiter(s) (50 mL/Hr) IV Continuous <Continuous>  dextrose 5%. 1000 milliLiter(s) (100 mL/Hr) IV Continuous <Continuous>  dextrose 50% Injectable 12.5 Gram(s) IV Push once  dextrose 50% Injectable 25 Gram(s) IV Push once  dextrose 50% Injectable 25 Gram(s) IV Push once  doxazosin 6 milliGRAM(s) Oral <User Schedule>  epoetin odalis (EPOGEN) Injectable 91204 Unit(s) SubCutaneous <User Schedule>  ferrous    sulfate Liquid 300 milliGRAM(s) Enteral Tube daily  glucagon  Injectable 1 milliGRAM(s) IntraMuscular once  heparin  Infusion 1000 Unit(s)/Hr (9.5 mL/Hr) IV Continuous <Continuous>  insulin lispro (ADMELOG) corrective regimen sliding scale   SubCutaneous every 6 hours  insulin NPH human recombinant 3 Unit(s) SubCutaneous every 6 hours  meropenem  IVPB 500 milliGRAM(s) IV Intermittent every 12 hours  mupirocin 2% Ointment 1 Application(s) Both Nostrils two times a day  pantoprazole  Injectable 40 milliGRAM(s) IV Push two times a day  psyllium Powder 1 Packet(s) Oral daily  sodium chloride 2 Gram(s) Oral two times a day    MEDICATIONS  (PRN):  acetaminophen   Oral Liquid .. 650 milliGRAM(s) Oral every 6 hours PRN Temp greater or equal to 38C (100.4F), Mild Pain (1 - 3)  dextrose Oral Gel 15 Gram(s) Oral once PRN Blood Glucose LESS THAN 70 milliGRAM(s)/deciliter      Vital Signs Last 24 Hrs  T(F): 97.3 (09-19-23 @ 17:57), Max: 98.4 (09-18-23 @ 18:09)  HR: 102 (09-19-23 @ 17:57) (90 - 107)  BP: 103/57 (09-19-23 @ 17:57) (103/48 - 151/75)  RR: 17 (09-19-23 @ 17:57) (17 - 23)  SpO2: 95% (09-19-23 @ 17:57) (92% - 100%)  Telemetry:   CAPILLARY BLOOD GLUCOSE      POCT Blood Glucose.: 214 mg/dL (19 Sep 2023 17:00)  POCT Blood Glucose.: 224 mg/dL (19 Sep 2023 11:49)  POCT Blood Glucose.: 235 mg/dL (19 Sep 2023 05:45)  POCT Blood Glucose.: 198 mg/dL (18 Sep 2023 23:18)    I&O's Summary    18 Sep 2023 07:01  -  19 Sep 2023 07:00  --------------------------------------------------------  IN: 1268 mL / OUT: 300 mL / NET: 968 mL        PHYSICAL EXAM:  GENERAL: NAD, well-developed  HEAD:  Atraumatic, Normocephalic  EYES: EOMI, PERRLA, conjunctiva and sclera clear  NECK: Supple, No JVD  CHEST/LUNG: Clear to auscultation bilaterally; No wheeze  HEART: Regular rate and rhythm; No murmurs, rubs, or gallops  ABDOMEN: Soft, Nontender, Nondistended; Bowel sounds present  EXTREMITIES:  2+ Peripheral Pulses, No clubbing, cyanosis, or edema  PSYCH: AAOx3  NEUROLOGY: non-focal  SKIN: No rashes or lesions    LABS:                        9.2    5.75  )-----------( 182      ( 19 Sep 2023 17:22 )             28.0     09-19    124<L>  |  90<L>  |  103<H>  ----------------------------<  231<H>  4.3   |  23  |  2.64<H>    Ca    8.4      19 Sep 2023 06:00  Phos  3.6     09-19  Mg     2.50     09-19      PTT - ( 19 Sep 2023 17:22 )  PTT:81.1 sec      Urinalysis Basic - ( 19 Sep 2023 06:00 )    Color: x / Appearance: x / SG: x / pH: x  Gluc: 231 mg/dL / Ketone: x  / Bili: x / Urobili: x   Blood: x / Protein: x / Nitrite: x   Leuk Esterase: x / RBC: x / WBC x   Sq Epi: x / Non Sq Epi: x / Bacteria: x        RADIOLOGY & ADDITIONAL TESTS:    Imaging Personally Reviewed:    Consultant(s) Notes Reviewed:      Care Discussed with Consultants/Other Providers:

## 2023-09-19 NOTE — PROGRESS NOTE ADULT - SUBJECTIVE AND OBJECTIVE BOX
CHIEF COMPLAINT: Patient is a 73y old  Female who presents with a chief complaint of Respiratory distress (19 Sep 2023 09:30)    Interval Events:    REVIEW OF SYSTEMS:  [ ] All other systems negative  [ ] Unable to assess ROS because ________    Mode: AC/ CMV (Assist Control/ Continuous Mandatory Ventilation), RR (machine): 20, TV (machine): 300, FiO2: 30, PEEP: 8, ITime: 0.53, MAP: 15, PIP: 38    OBJECTIVE:  ICU Vital Signs Last 24 Hrs  T(C): 36.6 (19 Sep 2023 03:18), Max: 36.9 (18 Sep 2023 18:09)  T(F): 97.8 (19 Sep 2023 03:18), Max: 98.4 (18 Sep 2023 18:09)  HR: 95 (19 Sep 2023 07:15) (90 - 103)  BP: 103/53 (19 Sep 2023 03:18) (101/80 - 151/75)  BP(mean): --  ABP: --  ABP(mean): --  RR: 20 (19 Sep 2023 03:18) (20 - 23)  SpO2: 95% (19 Sep 2023 07:15) (94% - 100%)    O2 Parameters below as of 19 Sep 2023 03:55  Patient On (Oxygen Delivery Method): ventilator    Mode: AC/ CMV (Assist Control/ Continuous Mandatory Ventilation), RR (machine): 20, TV (machine): 300, FiO2: 30, PEEP: 8, ITime: 0.53, MAP: 15, PIP: 38    09-18 @ 07:01  -  09-19 @ 07:00  --------------------------------------------------------  IN: 1268 mL / OUT: 300 mL / NET: 968 mL    CAPILLARY BLOOD GLUCOSE    POCT Blood Glucose.: 235 mg/dL (19 Sep 2023 05:45)    HOSPITAL MEDICATIONS:  MEDICATIONS  (STANDING):  albuterol/ipratropium for Nebulization 3 milliLiter(s) Nebulizer every 6 hours  atorvastatin 40 milliGRAM(s) Oral at bedtime  buMETAnide Injectable 2 milliGRAM(s) IV Push two times a day  chlorhexidine 0.12% Liquid 15 milliLiter(s) Oral Mucosa every 12 hours  chlorhexidine 2% Cloths 1 Application(s) Topical daily  dextrose 5%. 1000 milliLiter(s) (50 mL/Hr) IV Continuous <Continuous>  dextrose 5%. 1000 milliLiter(s) (100 mL/Hr) IV Continuous <Continuous>  dextrose 50% Injectable 12.5 Gram(s) IV Push once  dextrose 50% Injectable 25 Gram(s) IV Push once  dextrose 50% Injectable 25 Gram(s) IV Push once  doxazosin 6 milliGRAM(s) Oral <User Schedule>  epoetin odalis (EPOGEN) Injectable 84883 Unit(s) SubCutaneous <User Schedule>  ferrous    sulfate Liquid 300 milliGRAM(s) Enteral Tube daily  glucagon  Injectable 1 milliGRAM(s) IntraMuscular once  heparin  Infusion 1000 Unit(s)/Hr (9.5 mL/Hr) IV Continuous <Continuous>  insulin lispro (ADMELOG) corrective regimen sliding scale   SubCutaneous every 6 hours  insulin NPH human recombinant 3 Unit(s) SubCutaneous every 6 hours  meropenem  IVPB 500 milliGRAM(s) IV Intermittent every 12 hours  mupirocin 2% Ointment 1 Application(s) Both Nostrils two times a day  pantoprazole  Injectable 40 milliGRAM(s) IV Push two times a day  psyllium Powder 1 Packet(s) Oral daily  sodium chloride 2 Gram(s) Oral two times a day  sodium chloride 1.5%. 500 milliLiter(s) (50 mL/Hr) IV Continuous <Continuous>  sodium chloride 3%  Inhalation 4 milliLiter(s) Inhalation every 6 hours    MEDICATIONS  (PRN):  acetaminophen   Oral Liquid .. 650 milliGRAM(s) Oral every 6 hours PRN Temp greater or equal to 38C (100.4F), Mild Pain (1 - 3)  dextrose Oral Gel 15 Gram(s) Oral once PRN Blood Glucose LESS THAN 70 milliGRAM(s)/deciliter      LABS:                        6.8    4.33  )-----------( 166      ( 19 Sep 2023 06:00 )             21.4     09-19    124<L>  |  90<L>  |  103<H>  ----------------------------<  231<H>  4.3   |  23  |  2.64<H>    Ca    8.4      19 Sep 2023 06:00  Phos  3.6     09-19  Mg     2.50     09-19    PTT - ( 18 Sep 2023 06:15 )  PTT:85.9 sec  Urinalysis Basic - ( 19 Sep 2023 06:00 )    Color: x / Appearance: x / SG: x / pH: x  Gluc: 231 mg/dL / Ketone: x  / Bili: x / Urobili: x   Blood: x / Protein: x / Nitrite: x   Leuk Esterase: x / RBC: x / WBC x   Sq Epi: x / Non Sq Epi: x / Bacteria: x    PAST MEDICAL & SURGICAL HISTORY:  Breast CA    Diabetes    Stroke    Cardiac arrest    HTN (hypertension)    H/O mastectomy, bilateral    FAMILY HISTORY:  No pertinent family history in first degree relatives    Social History:    RADIOLOGY:  [ ] Reviewed and interpreted by me    PULMONARY FUNCTION TESTS:    EKG: CHIEF COMPLAINT: Patient is a 73y old  Female who presents with a chief complaint of Respiratory distress (19 Sep 2023 09:30)    Interval Events: None reported overnight. Pt still noted with poor mental status. Nephrology following, will resume HD i/s/o rising BUN.                         Also noted with drop in hemoglobin this morning, s/p 1 unit of prbc with significant improvement.                         ENT also reconsulted for further recs regarding Otitis externa L ear, as still noted with white drainage x 2 days - recs pending     REVIEW OF SYSTEMS:  See above   [x] Unable to assess ROS because poor mental status     Mode: AC/ CMV (Assist Control/ Continuous Mandatory Ventilation), RR (machine): 20, TV (machine): 300, FiO2: 30, PEEP: 8, ITime: 0.53, MAP: 15, PIP: 38    OBJECTIVE:  ICU Vital Signs Last 24 Hrs  T(C): 36.6 (19 Sep 2023 03:18), Max: 36.9 (18 Sep 2023 18:09)  T(F): 97.8 (19 Sep 2023 03:18), Max: 98.4 (18 Sep 2023 18:09)  HR: 95 (19 Sep 2023 07:15) (90 - 103)  BP: 103/53 (19 Sep 2023 03:18) (101/80 - 151/75)  BP(mean): --  ABP: --  ABP(mean): --  RR: 20 (19 Sep 2023 03:18) (20 - 23)  SpO2: 95% (19 Sep 2023 07:15) (94% - 100%)    O2 Parameters below as of 19 Sep 2023 03:55  Patient On (Oxygen Delivery Method): ventilator    Mode: AC/ CMV (Assist Control/ Continuous Mandatory Ventilation), RR (machine): 20, TV (machine): 300, FiO2: 30, PEEP: 8, ITime: 0.53, MAP: 15, PIP: 38    09-18 @ 07:01  -  09-19 @ 07:00  --------------------------------------------------------  IN: 1268 mL / OUT: 300 mL / NET: 968 mL    CAPILLARY BLOOD GLUCOSE    POCT Blood Glucose.: 235 mg/dL (19 Sep 2023 05:45)    HOSPITAL MEDICATIONS:  MEDICATIONS  (STANDING):  albuterol/ipratropium for Nebulization 3 milliLiter(s) Nebulizer every 6 hours  atorvastatin 40 milliGRAM(s) Oral at bedtime  buMETAnide Injectable 2 milliGRAM(s) IV Push two times a day  chlorhexidine 0.12% Liquid 15 milliLiter(s) Oral Mucosa every 12 hours  chlorhexidine 2% Cloths 1 Application(s) Topical daily  dextrose 5%. 1000 milliLiter(s) (50 mL/Hr) IV Continuous <Continuous>  dextrose 5%. 1000 milliLiter(s) (100 mL/Hr) IV Continuous <Continuous>  dextrose 50% Injectable 12.5 Gram(s) IV Push once  dextrose 50% Injectable 25 Gram(s) IV Push once  dextrose 50% Injectable 25 Gram(s) IV Push once  doxazosin 6 milliGRAM(s) Oral <User Schedule>  epoetin odalis (EPOGEN) Injectable 15773 Unit(s) SubCutaneous <User Schedule>  ferrous    sulfate Liquid 300 milliGRAM(s) Enteral Tube daily  glucagon  Injectable 1 milliGRAM(s) IntraMuscular once  heparin  Infusion 1000 Unit(s)/Hr (9.5 mL/Hr) IV Continuous <Continuous>  insulin lispro (ADMELOG) corrective regimen sliding scale   SubCutaneous every 6 hours  insulin NPH human recombinant 3 Unit(s) SubCutaneous every 6 hours  meropenem  IVPB 500 milliGRAM(s) IV Intermittent every 12 hours  mupirocin 2% Ointment 1 Application(s) Both Nostrils two times a day  pantoprazole  Injectable 40 milliGRAM(s) IV Push two times a day  psyllium Powder 1 Packet(s) Oral daily  sodium chloride 2 Gram(s) Oral two times a day  sodium chloride 1.5%. 500 milliLiter(s) (50 mL/Hr) IV Continuous <Continuous>  sodium chloride 3%  Inhalation 4 milliLiter(s) Inhalation every 6 hours    MEDICATIONS  (PRN):  acetaminophen   Oral Liquid .. 650 milliGRAM(s) Oral every 6 hours PRN Temp greater or equal to 38C (100.4F), Mild Pain (1 - 3)  dextrose Oral Gel 15 Gram(s) Oral once PRN Blood Glucose LESS THAN 70 milliGRAM(s)/deciliter    PHYSICAL EXAM  GENERAL: Laying in bed comfortably, NAD  HEENT: +Trach, area c/d/i, +L ear purulent drainage noted  PULM: +Normal resp effort, + Rhonchi b/l, no w appreciated   HEART: +s1, s2  GI: +BS, soft, nt/nd, no peritoneal signs noted  MSK: No asymmetry, LIZZIE in upper extremities. RLE weakness- baseline. 2+ b/l pitting edema noted in lower ext   NEURO: Does not follow commands      LABS:                        6.8    4.33  )-----------( 166      ( 19 Sep 2023 06:00 )             21.4     09-19    124<L>  |  90<L>  |  103<H>  ----------------------------<  231<H>  4.3   |  23  |  2.64<H>    Ca    8.4      19 Sep 2023 06:00  Phos  3.6     09-19  Mg     2.50     09-19    PTT - ( 18 Sep 2023 06:15 )  PTT:85.9 sec  Urinalysis Basic - ( 19 Sep 2023 06:00 )    Color: x / Appearance: x / SG: x / pH: x  Gluc: 231 mg/dL / Ketone: x  / Bili: x / Urobili: x   Blood: x / Protein: x / Nitrite: x   Leuk Esterase: x / RBC: x / WBC x   Sq Epi: x / Non Sq Epi: x / Bacteria: x    PAST MEDICAL & SURGICAL HISTORY:  Breast CA    Diabetes    Stroke    Cardiac arrest    HTN (hypertension)    H/O mastectomy, bilateral    FAMILY HISTORY:  No pertinent family history in first degree relatives    Social History:    RADIOLOGY:  [ ] Reviewed and interpreted by me    PULMONARY FUNCTION TESTS:    EKG:

## 2023-09-19 NOTE — PROGRESS NOTE ADULT - SUBJECTIVE AND OBJECTIVE BOX
NEPHROLOGY     Patient seen and examined with  at bedside, pt trach to vent, no acute distress.     MEDICATIONS  (STANDING):  albuterol/ipratropium for Nebulization 3 milliLiter(s) Nebulizer every 6 hours  atorvastatin 40 milliGRAM(s) Oral at bedtime  buMETAnide Injectable 2 milliGRAM(s) IV Push two times a day  chlorhexidine 0.12% Liquid 15 milliLiter(s) Oral Mucosa every 12 hours  chlorhexidine 2% Cloths 1 Application(s) Topical daily  dextrose 5%. 1000 milliLiter(s) (50 mL/Hr) IV Continuous <Continuous>  dextrose 5%. 1000 milliLiter(s) (100 mL/Hr) IV Continuous <Continuous>  dextrose 50% Injectable 12.5 Gram(s) IV Push once  dextrose 50% Injectable 25 Gram(s) IV Push once  dextrose 50% Injectable 25 Gram(s) IV Push once  doxazosin 6 milliGRAM(s) Oral <User Schedule>  epoetin odalis (EPOGEN) Injectable 02522 Unit(s) SubCutaneous <User Schedule>  ferrous    sulfate Liquid 300 milliGRAM(s) Enteral Tube daily  glucagon  Injectable 1 milliGRAM(s) IntraMuscular once  heparin  Infusion 1000 Unit(s)/Hr (9.5 mL/Hr) IV Continuous <Continuous>  insulin lispro (ADMELOG) corrective regimen sliding scale   SubCutaneous every 6 hours  insulin NPH human recombinant 3 Unit(s) SubCutaneous every 6 hours  meropenem  IVPB 500 milliGRAM(s) IV Intermittent every 12 hours  mupirocin 2% Ointment 1 Application(s) Both Nostrils two times a day  pantoprazole  Injectable 40 milliGRAM(s) IV Push two times a day  psyllium Powder 1 Packet(s) Oral daily  sodium chloride 2 Gram(s) Oral two times a day  sodium chloride 1.5%. 500 milliLiter(s) (50 mL/Hr) IV Continuous <Continuous>  sodium chloride 3%  Inhalation 4 milliLiter(s) Inhalation every 6 hours    VITALS:  T(C): , Max: 36.9 (09-18-23 @ 18:09)  T(F): , Max: 98.4 (09-18-23 @ 18:09)  HR: 95 (09-19-23 @ 07:15)  BP: 103/53 (09-19-23 @ 03:18)  RR: 20 (09-19-23 @ 03:18)  SpO2: 95% (09-19-23 @ 07:15)    I and O's:    09-18 @ 07:01  -  09-19 @ 07:00  --------------------------------------------------------  IN: 1268 mL / OUT: 300 mL / NET: 968 mL    PHYSICAL EXAM:  Constitutional: trach to vent  HEENT: + tracheostomy, + NGT  Respiratory: Coarse BS  Cardiovascular: S1 and S2  Gastrointestinal: BS+, soft, NT/ND  Extremities: + peripheral edema  Neurological: UTO  : +external catheter   Skin: No rashes  Access: [facility] HD catheter (8/19)    LABS:                        6.8    4.33  )-----------( 166      ( 19 Sep 2023 06:00 )             21.4     09-19    124<L>  |  90<L>  |  103<H>  ----------------------------<  231<H>  4.3   |  23  |  2.64<H>    Ca    8.4      19 Sep 2023 06:00  Phos  3.6     09-19  Mg     2.50     09-19    Urine Studies:  Urinalysis Basic - ( 19 Sep 2023 06:00 )    Color: x / Appearance: x / SG: x / pH: x  Gluc: 231 mg/dL / Ketone: x  / Bili: x / Urobili: x   Blood: x / Protein: x / Nitrite: x   Leuk Esterase: x / RBC: x / WBC x   Sq Epi: x / Non Sq Epi: x / Bacteria: x

## 2023-09-20 NOTE — PROGRESS NOTE ADULT - SUBJECTIVE AND OBJECTIVE BOX
Subjective: Patient seen and examined. No new events except as noted.     REVIEW OF SYSTEMS:    Unable to obtain     MEDICATIONS:  MEDICATIONS  (STANDING):  albuterol/ipratropium for Nebulization 3 milliLiter(s) Nebulizer every 6 hours  atorvastatin 40 milliGRAM(s) Oral at bedtime  buMETAnide Injectable 2 milliGRAM(s) IV Push two times a day  chlorhexidine 0.12% Liquid 15 milliLiter(s) Oral Mucosa every 12 hours  chlorhexidine 2% Cloths 1 Application(s) Topical daily  dextrose 5%. 1000 milliLiter(s) (50 mL/Hr) IV Continuous <Continuous>  dextrose 5%. 1000 milliLiter(s) (100 mL/Hr) IV Continuous <Continuous>  dextrose 50% Injectable 12.5 Gram(s) IV Push once  dextrose 50% Injectable 25 Gram(s) IV Push once  dextrose 50% Injectable 25 Gram(s) IV Push once  doxazosin 6 milliGRAM(s) Oral <User Schedule>  epoetin odalis (EPOGEN) Injectable 74519 Unit(s) SubCutaneous <User Schedule>  ferrous    sulfate Liquid 300 milliGRAM(s) Enteral Tube daily  glucagon  Injectable 1 milliGRAM(s) IntraMuscular once  insulin lispro (ADMELOG) corrective regimen sliding scale   SubCutaneous every 6 hours  insulin NPH human recombinant 3 Unit(s) SubCutaneous every 6 hours  meropenem  IVPB 500 milliGRAM(s) IV Intermittent every 12 hours  mupirocin 2% Ointment 1 Application(s) Both Nostrils two times a day  pantoprazole  Injectable 40 milliGRAM(s) IV Push two times a day  psyllium Powder 1 Packet(s) Oral daily  sodium chloride 2 Gram(s) Oral two times a day      PHYSICAL EXAM:  T(C): 38.1 (09-20-23 @ 10:07), Max: 38.3 (09-20-23 @ 07:43)  HR: 104 (09-20-23 @ 10:33) (98 - 107)  BP: 127/45 (09-20-23 @ 03:21) (92/37 - 127/45)  RR: 19 (09-19-23 @ 22:54) (17 - 21)  SpO2: 94% (09-20-23 @ 10:33) (92% - 100%)  Wt(kg): --  I&O's Summary    19 Sep 2023 07:01  -  20 Sep 2023 07:00  --------------------------------------------------------  IN: 2168 mL / OUT: 531 mL / NET: 1637 mL            Appearance: NAD, +trach  HEENT: dry oral mucosa  Lymphatic: No lymphadenopathy  Cardiovascular: Normal S1 S2, No JVD, No murmurs, No edema  Respiratory: Decreased BS, + trach to vent	  Neuro: opens eyes  Gastrointestinal: Soft, Non-tender, + BS, + NGT  Skin: No rashes, No ecchymoses, No cyanosis	  Extremities: No strength/ROM 2/2 sedation, + BL LE edema  Vascular: Peripheral pulses palpable 2+ bilaterally    Mode: AC/ CMV (Assist Control/ Continuous Mandatory Ventilation), RR (machine): 17, TV (machine): 340, FiO2: 30, PEEP: 8, ITime: 0.82, MAP: 15, PIP: 39    LABS:    CARDIAC MARKERS:      Blood Gas Profile - Arterial in AM (09.19.23 @ 17:22)   pH, Arterial: 7.28  pCO2, Arterial: 52 mmHg  pO2, Arterial: 75 mmHg  HCO3, Arterial: 24 mmol/L  Base Excess, Arterial: -3.0 mmol/L  Oxygen Saturation, Arterial: 97.9 %  Total CO2, Arterial: 26 mmol/L  FIO2, Arterial: 30                          8.1    4.64  )-----------( 171      ( 20 Sep 2023 06:02 )             24.8     09-20    125<L>  |  91<L>  |  103<H>  ----------------------------<  230<H>  4.6   |  23  |  2.52<H>    Ca    8.3<L>      20 Sep 2023 06:02  Phos  3.7     09-20  Mg     2.40     09-20    TPro  4.9<L>  /  Alb  1.6<L>  /  TBili  0.2  /  DBili  x   /  AST  38<H>  /  ALT  13  /  AlkPhos  315<H>  09-20    proBNP:   Lipid Profile:   HgA1c:   TSH:             TELEMETRY: 	    ECG:  	  RADIOLOGY:   DIAGNOSTIC TESTING:  [ ] Echocardiogram:  [ ]  Catheterization:  [ ] Stress Test:    OTHER:

## 2023-09-20 NOTE — PROGRESS NOTE ADULT - SUBJECTIVE AND OBJECTIVE BOX
CHIEF COMPLAINT: Patient is a 73y old  Female who presents with a chief complaint of Respiratory distress (19 Sep 2023 18:05)      INTERVAL EVENTS: No acute events overnight.    REVIEW OF SYSTEMS: Seen and evaluated by bedside during AM rounds.      Mode: AC/ CMV (Assist Control/ Continuous Mandatory Ventilation), RR (machine): 17, TV (machine): 340, FiO2: 30, PEEP: 8, ITime: 0.82, MAP: 15, PIP: 39  Arterial Blood Gas:  09-19 @ 17:22  7.28/52/75/24/97.9/-3.0  ABG lactate: --      OBJECTIVE:  ICU Vital Signs Last 24 Hrs  T(C): 36.7 (20 Sep 2023 03:21), Max: 37.2 (19 Sep 2023 19:36)  T(F): 98.1 (20 Sep 2023 03:21), Max: 98.9 (19 Sep 2023 19:36)  HR: 103 (20 Sep 2023 03:25) (95 - 107)  BP: 127/45 (20 Sep 2023 03:21) (92/37 - 127/45)  BP(mean): --  ABP: --  ABP(mean): --  RR: 19 (19 Sep 2023 22:54) (17 - 21)  SpO2: 99% (20 Sep 2023 03:25) (92% - 100%)    O2 Parameters below as of 20 Sep 2023 03:25  Patient On (Oxygen Delivery Method): ventilator          Mode: AC/ CMV (Assist Control/ Continuous Mandatory Ventilation), RR (machine): 17, TV (machine): 340, FiO2: 30, PEEP: 8, ITime: 0.82, MAP: 15, PIP: 39    09-19 @ 07:01  -  09-20 @ 07:00  --------------------------------------------------------  IN: 2168 mL / OUT: 531 mL / NET: 1637 mL      CAPILLARY BLOOD GLUCOSE      POCT Blood Glucose.: 222 mg/dL (20 Sep 2023 05:38)      HOSPITAL MEDICATIONS:  MEDICATIONS  (STANDING):  albuterol/ipratropium for Nebulization 3 milliLiter(s) Nebulizer every 6 hours  atorvastatin 40 milliGRAM(s) Oral at bedtime  buMETAnide Injectable 2 milliGRAM(s) IV Push two times a day  chlorhexidine 0.12% Liquid 15 milliLiter(s) Oral Mucosa every 12 hours  chlorhexidine 2% Cloths 1 Application(s) Topical daily  dextrose 5%. 1000 milliLiter(s) (50 mL/Hr) IV Continuous <Continuous>  dextrose 5%. 1000 milliLiter(s) (100 mL/Hr) IV Continuous <Continuous>  dextrose 50% Injectable 12.5 Gram(s) IV Push once  dextrose 50% Injectable 25 Gram(s) IV Push once  dextrose 50% Injectable 25 Gram(s) IV Push once  doxazosin 6 milliGRAM(s) Oral <User Schedule>  epoetin odalis (EPOGEN) Injectable 54738 Unit(s) SubCutaneous <User Schedule>  ferrous    sulfate Liquid 300 milliGRAM(s) Enteral Tube daily  glucagon  Injectable 1 milliGRAM(s) IntraMuscular once  heparin  Infusion 1000 Unit(s)/Hr (9.5 mL/Hr) IV Continuous <Continuous>  insulin lispro (ADMELOG) corrective regimen sliding scale   SubCutaneous every 6 hours  insulin NPH human recombinant 3 Unit(s) SubCutaneous every 6 hours  meropenem  IVPB 500 milliGRAM(s) IV Intermittent every 12 hours  mupirocin 2% Ointment 1 Application(s) Both Nostrils two times a day  pantoprazole  Injectable 40 milliGRAM(s) IV Push two times a day  psyllium Powder 1 Packet(s) Oral daily  sodium chloride 2 Gram(s) Oral two times a day    MEDICATIONS  (PRN):  acetaminophen   Oral Liquid .. 650 milliGRAM(s) Oral every 6 hours PRN Temp greater or equal to 38C (100.4F), Mild Pain (1 - 3)  dextrose Oral Gel 15 Gram(s) Oral once PRN Blood Glucose LESS THAN 70 milliGRAM(s)/deciliter      PHYSICAL EXAMINATION  General: Alert and cooperative. Resting comfortably. No apparent distress noted. Dry mucous membrane noted. White conjunctiva, no icterus.   HEENT: Normocephalic/atraumatic. EOMI. No discharge, conjunctivitis, or scleral icterus. No ptosis. No discharge or sinus tenderness noted. Mucous membranes are pink without bleeding. Tonsils are not enlarged. Good dental hygiene noted. No facial asymmetry. Neck is supple with a full range of motion. No masses or tenderness. Trachea is midline. Lymph nodes are not enlarged.   Cardiovascular: Normal rate and regular rhythm. No murmur/gallop.   Respiratory: Breath sounds clear and equal bilaterally without rales, wheezing, or rhonchi. No accessory muscles used.   Abdomen: Soft, nontender, nondistended. Bowel sounds are present. No bruit. No hepatosplenomegaly or masses. No rebound or guarding.   Skin: Warm to touch. No rashes, wounds, or skin lesions noted.   Extremities: +2 dorsalis pedis pulse is noted for the lower extremities. Full range of motion on all four extremities. 5/5 motor strength noted in all extremities. No clubbing, cyanosis, edema, or varicose veins.   MSK: No swelling or deformity. Posture, body symmetry, and movements are appropriate.   Neuro: AxO x 3, CN II - XII grossly intact.    LABS:                        8.1    4.64  )-----------( 171      ( 20 Sep 2023 06:02 )             24.8     09-19    124<L>  |  90<L>  |  103<H>  ----------------------------<  231<H>  4.3   |  23  |  2.64<H>    Ca    8.4      19 Sep 2023 06:00  Phos  3.6     09-19  Mg     2.50     09-19      PTT - ( 19 Sep 2023 17:22 )  PTT:81.1 sec  Urinalysis Basic - ( 19 Sep 2023 06:00 )    Color: x / Appearance: x / SG: x / pH: x  Gluc: 231 mg/dL / Ketone: x  / Bili: x / Urobili: x   Blood: x / Protein: x / Nitrite: x   Leuk Esterase: x / RBC: x / WBC x   Sq Epi: x / Non Sq Epi: x / Bacteria: x      Arterial Blood Gas:  09-19 @ 17:22  7.28/52/75/24/97.9/-3.0  ABG lactate: --        PAST MEDICAL & SURGICAL HISTORY:  Breast CA      Diabetes      Stroke      Cardiac arrest      HTN (hypertension)      H/O mastectomy, bilateral          FAMILY HISTORY:  No pertinent family history in first degree relatives        Social History:      RADIOLOGY:  [ ] Reviewed and interpreted by me    PULMONARY FUNCTION TESTS:    EKG: CHIEF COMPLAINT: Patient is a 73y old  Female who presents with a chief complaint of Respiratory distress    INTERVAL EVENTS: Unable to complete HD OVN d/t bleeding from Shiley.     REVIEW OF SYSTEMS: Seen and evaluated by bedside during AM rounds. Unable to assess ROS due to mentation. Temp 101 axillary this AM. RVP, UA, BCx sent. Holding Heparin, reattempt HD today.     Mode: AC/ CMV (Assist Control/ Continuous Mandatory Ventilation), RR (machine): 17, TV (machine): 340, FiO2: 30, PEEP: 8, ITime: 0.82, MAP: 15, PIP: 39  Arterial Blood Gas:  09-19 @ 17:22  7.28/52/75/24/97.9/-3.0  ABG lactate: --      OBJECTIVE:  ICU Vital Signs Last 24 Hrs  T(C): 36.7 (20 Sep 2023 03:21), Max: 37.2 (19 Sep 2023 19:36)  T(F): 98.1 (20 Sep 2023 03:21), Max: 98.9 (19 Sep 2023 19:36)  HR: 103 (20 Sep 2023 03:25) (95 - 107)  BP: 127/45 (20 Sep 2023 03:21) (92/37 - 127/45)  BP(mean): --  ABP: --  ABP(mean): --  RR: 19 (19 Sep 2023 22:54) (17 - 21)  SpO2: 99% (20 Sep 2023 03:25) (92% - 100%)    O2 Parameters below as of 20 Sep 2023 03:25  Patient On (Oxygen Delivery Method): ventilator          Mode: AC/ CMV (Assist Control/ Continuous Mandatory Ventilation), RR (machine): 17, TV (machine): 340, FiO2: 30, PEEP: 8, ITime: 0.82, MAP: 15, PIP: 39    09-19 @ 07:01  -  09-20 @ 07:00  --------------------------------------------------------  IN: 2168 mL / OUT: 531 mL / NET: 1637 mL      CAPILLARY BLOOD GLUCOSE      POCT Blood Glucose.: 222 mg/dL (20 Sep 2023 05:38)      HOSPITAL MEDICATIONS:  MEDICATIONS  (STANDING):  albuterol/ipratropium for Nebulization 3 milliLiter(s) Nebulizer every 6 hours  atorvastatin 40 milliGRAM(s) Oral at bedtime  buMETAnide Injectable 2 milliGRAM(s) IV Push two times a day  chlorhexidine 0.12% Liquid 15 milliLiter(s) Oral Mucosa every 12 hours  chlorhexidine 2% Cloths 1 Application(s) Topical daily  dextrose 5%. 1000 milliLiter(s) (50 mL/Hr) IV Continuous <Continuous>  dextrose 5%. 1000 milliLiter(s) (100 mL/Hr) IV Continuous <Continuous>  dextrose 50% Injectable 12.5 Gram(s) IV Push once  dextrose 50% Injectable 25 Gram(s) IV Push once  dextrose 50% Injectable 25 Gram(s) IV Push once  doxazosin 6 milliGRAM(s) Oral <User Schedule>  epoetin odalis (EPOGEN) Injectable 34139 Unit(s) SubCutaneous <User Schedule>  ferrous    sulfate Liquid 300 milliGRAM(s) Enteral Tube daily  glucagon  Injectable 1 milliGRAM(s) IntraMuscular once  heparin  Infusion 1000 Unit(s)/Hr (9.5 mL/Hr) IV Continuous <Continuous>  insulin lispro (ADMELOG) corrective regimen sliding scale   SubCutaneous every 6 hours  insulin NPH human recombinant 3 Unit(s) SubCutaneous every 6 hours  meropenem  IVPB 500 milliGRAM(s) IV Intermittent every 12 hours  mupirocin 2% Ointment 1 Application(s) Both Nostrils two times a day  pantoprazole  Injectable 40 milliGRAM(s) IV Push two times a day  psyllium Powder 1 Packet(s) Oral daily  sodium chloride 2 Gram(s) Oral two times a day    MEDICATIONS  (PRN):  acetaminophen   Oral Liquid .. 650 milliGRAM(s) Oral every 6 hours PRN Temp greater or equal to 38C (100.4F), Mild Pain (1 - 3)  dextrose Oral Gel 15 Gram(s) Oral once PRN Blood Glucose LESS THAN 70 milliGRAM(s)/deciliter      PHYSICAL EXAMINATION  General: Resting comfortably. Family at bedside.  HEENT: Normocephalic/atraumatic. + purulent discharge from left ear + NGT + RIJ Shiley  Cardiovascular: Normal rate and regular rhythm.   Respiratory: + AC VC 17/340/+8/30&%. Breath sounds clear and equal bilaterally without rales, wheezing, or rhonchi. No accessory muscles used.   Abdomen: Soft, nontender, nondistended.   Skin: Warm to touch. No rashes, wounds, or skin lesions noted.   Extremities: + BL edema lower extremities. No clubbing, cyanosis, or varicose veins.   Neuro: Unable to assess ROS.    LABS:                        8.1    4.64  )-----------( 171      ( 20 Sep 2023 06:02 )             24.8     09-19    124<L>  |  90<L>  |  103<H>  ----------------------------<  231<H>  4.3   |  23  |  2.64<H>    Ca    8.4      19 Sep 2023 06:00  Phos  3.6     09-19  Mg     2.50     09-19      PTT - ( 19 Sep 2023 17:22 )  PTT:81.1 sec  Urinalysis Basic - ( 19 Sep 2023 06:00 )    Color: x / Appearance: x / SG: x / pH: x  Gluc: 231 mg/dL / Ketone: x  / Bili: x / Urobili: x   Blood: x / Protein: x / Nitrite: x   Leuk Esterase: x / RBC: x / WBC x   Sq Epi: x / Non Sq Epi: x / Bacteria: x      Arterial Blood Gas:  09-19 @ 17:22  7.28/52/75/24/97.9/-3.0  ABG lactate: --        PAST MEDICAL & SURGICAL HISTORY:  Breast CA      Diabetes      Stroke      Cardiac arrest      HTN (hypertension)      H/O mastectomy, bilateral          FAMILY HISTORY:  No pertinent family history in first degree relatives        Social History:      RADIOLOGY:  [ ] Reviewed and interpreted by me    PULMONARY FUNCTION TESTS:    EKG:

## 2023-09-20 NOTE — PROGRESS NOTE ADULT - NS ATTEND AMEND GEN_ALL_CORE FT
Pt is a 73F with PMHx IDDMII, CKD, hx CVA, CHFpEF, latent TB (s/p tx,) hx breast cancer (2018, s/p mastectomy and adjuvant CT/RT), and hx CVA s/p trach/PEG (now decannulated) initially presenting to [facility] on 8/11/23 with acute hypoxemic and hypercapnic respiratory failure s/p ETT intubation/MV and ARF with pulmonary edema c/b HTN emergency requiring nicardipine drip transferred to MICU for further management.     Pt initially p/w worsening RUE shaking and dysconjugate gaze with MRI 8/17 (+) new right cerebellar, midbrain, and multiple tiny embolic infarcts as well as known left PCA CVA. EEG (-). 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 ILR in place from prior CVA evaluation, last interrogated 9/7 without AF. At baseline, pt reportedly wheelchair bound with RUE tremors and some ADL assistance. Pt was unable to tolerate ASA 2/2 GIB, will re-attempt once more stable. Worsening mentation this weekend possibly 2/2 metabolic encephalopathy in the setting of hyponatremia and uremia. No new focal deficits noted.     Pt with acute hypoxemic and hypercapnic respiratory failure s/p ETT intubation/MV possibly 2/2 flash pulmonary edema in the setting of HTN emergency +/- aspiration event. Pt with failure of ventilatory weaning now s/p tracheostomy placement (IP 9/1.) Of note, pt also noted to have evidence of midbrain CVA with possible concern for central effect on respiratory drive, will monitor carefully and continue weaning trials as tolerated. Pt able to spontaneously breath intermittently, will continue to attempt further weaning trials. Airway clearance therapy in place. Wean O2 supplementation for goal O2 saturation 90-95%. Oral hygiene and aspiration precautions in place. Trach care and suctioning as per RCU team.     Pt p/w ARF on CKD requiring initiation of HD 2/2 pulmonary edema and metabolic acidosis with metabolic encephalopathy. No urgent indication for HD today, further HD as per nephrology team. Finished prednisone taper for possible AIN. Strict I/Os, pt makes small amount of urine. Acute hypervolemic hyponatremia in the setting of renal failure not resolving despite hypertonic saline, diuretics, and salt tabs now also with worsening uremia. Renal following, recs appreciated. Will perform daily HD via right Shiley for the next few days and re-assess changes to mental status. Pt was only able to tolerate 25 minutes of HD 2/2 bleeding at catheter site. Heparin drip held and surgicel with pressure dressing applied, will re-attempt HD at slower flow rate today. If unable to perform 2/2 persistent bleeding, will need DDAVP and catheter exchange.    Hospital course further c/b recurrent infections, most recently with MSSA bacteremia (9/1, cleared 9/4 and 9/8) with (+) MSSA and ESBL EColi in BAL as well. Pt with possible cefepime vs. cefazolin drug-induced rash followed by transition to vancomycin. Was then found to have left otitis externa (9/5) now requiring serial bedside debridement with worsening necrotic tissue/erythema for which she was also initiated on meropenem. CTTB pending, family refusing contrast, non-contrast study obtained with evidence of bone involvement but no indication for further surgical intervention. Cultures NTD, pt remains febrile intermittently. Will continue with meropenem, CT imaging does not show significant enough bone involvement to require prolonged course for osteomyelitis. However, pt with new white unclear drainage from the ear x3 days. Will re-consult ENT. Ciprodex otic drops x10 day course now completed. Plan to complete vancomycin by level x4 week course through 10/2 (given inability to exclude endocarditis). Overnight, pt with recurrent fevers. Discussed with ID, recs appreciated. Will f/u repeat cx and obtain pan-CT scan.    Pt also with oropharyngeal dysphagia requiring NGTs followed by UGIB s/p EGD (8/31) found to have esophagitis/erosive gastropathy now on PPI BID. Pt further found to have popliteal DVT. No c/i by GI for A/C, pt initiated on heparin drip with patient-specific pTTs. Will need to ultimately transition to Coumadin x3-6 months after PEG placement. Held today 2/2 concern for bleeding with HD.     Dispo pending medical optimization. Pt full code. DVT ppx full A/C with heparin drip. Discussed with pt's family ( and daughter) at bedside daily.

## 2023-09-20 NOTE — PROGRESS NOTE ADULT - SUBJECTIVE AND OBJECTIVE BOX
NEPHROLOGY     Patient seen and examined with  at bedside, pt salbador mitchell, no acute distress, noted she had HD treatment for only 25 minutes last night, unable to complete treatment due to bleeding from catheter site.     MEDICATIONS  (STANDING):  albuterol/ipratropium for Nebulization 3 milliLiter(s) Nebulizer every 6 hours  atorvastatin 40 milliGRAM(s) Oral at bedtime  buMETAnide Injectable 2 milliGRAM(s) IV Push two times a day  chlorhexidine 0.12% Liquid 15 milliLiter(s) Oral Mucosa every 12 hours  chlorhexidine 2% Cloths 1 Application(s) Topical daily  dextrose 5%. 1000 milliLiter(s) (50 mL/Hr) IV Continuous <Continuous>  dextrose 5%. 1000 milliLiter(s) (100 mL/Hr) IV Continuous <Continuous>  dextrose 50% Injectable 12.5 Gram(s) IV Push once  dextrose 50% Injectable 25 Gram(s) IV Push once  dextrose 50% Injectable 25 Gram(s) IV Push once  doxazosin 6 milliGRAM(s) Oral <User Schedule>  epoetin odalis (EPOGEN) Injectable 63933 Unit(s) SubCutaneous <User Schedule>  ferrous    sulfate Liquid 300 milliGRAM(s) Enteral Tube daily  glucagon  Injectable 1 milliGRAM(s) IntraMuscular once  insulin lispro (ADMELOG) corrective regimen sliding scale   SubCutaneous every 6 hours  insulin NPH human recombinant 3 Unit(s) SubCutaneous every 6 hours  meropenem  IVPB 500 milliGRAM(s) IV Intermittent every 12 hours  mupirocin 2% Ointment 1 Application(s) Both Nostrils two times a day  pantoprazole  Injectable 40 milliGRAM(s) IV Push two times a day  psyllium Powder 1 Packet(s) Oral daily  sodium chloride 2 Gram(s) Oral two times a day    VITALS:  T(C): , Max: 38.3 (09-20-23 @ 07:43)  T(F): , Max: 101 (09-20-23 @ 07:43)  HR: 104 (09-20-23 @ 10:33)  BP: 127/45 (09-20-23 @ 03:21)  RR: 19 (09-19-23 @ 22:54)  SpO2: 94% (09-20-23 @ 10:33)    I and O's:    09-19 @ 07:01  -  09-20 @ 07:00  --------------------------------------------------------  IN: 2168 mL / OUT: 531 mL / NET: 1637 mL    PHYSICAL EXAM:  Constitutional: trach to vent  HEENT: + tracheostomy, + NGT  Respiratory: Coarse BS  Cardiovascular: S1 and S2  Gastrointestinal: BS+, soft, NT/ND  Extremities: + peripheral edema  Neurological: UTO  : +external catheter   Skin: No rashes  Access: [facility] HD catheter (8/19)    LABS:                        8.1    4.64  )-----------( 171      ( 20 Sep 2023 06:02 )             24.8     09-20    125<L>  |  91<L>  |  103<H>  ----------------------------<  230<H>  4.6   |  23  |  2.52<H>    Ca    8.3<L>      20 Sep 2023 06:02  Phos  3.7     09-20  Mg     2.40     09-20    TPro  4.9<L>  /  Alb  1.6<L>  /  TBili  0.2  /  DBili  x   /  AST  38<H>  /  ALT  13  /  AlkPhos  315<H>  09-20    Urine Studies:  Urinalysis Basic - ( 20 Sep 2023 06:02 )    Color: x / Appearance: x / SG: x / pH: x  Gluc: 230 mg/dL / Ketone: x  / Bili: x / Urobili: x   Blood: x / Protein: x / Nitrite: x   Leuk Esterase: x / RBC: x / WBC x   Sq Epi: x / Non Sq Epi: x / Bacteria: x   NEPHROLOGY     Patient seen and examined with  at bedside, pt salbador mitchell, no acute distress, noted she had HD treatment for only 25 minutes last night, unable to complete treatment due to bleeding from catheter site.     MEDICATIONS  (STANDING):  albuterol/ipratropium for Nebulization 3 milliLiter(s) Nebulizer every 6 hours  atorvastatin 40 milliGRAM(s) Oral at bedtime  buMETAnide Injectable 2 milliGRAM(s) IV Push two times a day  chlorhexidine 0.12% Liquid 15 milliLiter(s) Oral Mucosa every 12 hours  chlorhexidine 2% Cloths 1 Application(s) Topical daily  dextrose 5%. 1000 milliLiter(s) (50 mL/Hr) IV Continuous <Continuous>  dextrose 5%. 1000 milliLiter(s) (100 mL/Hr) IV Continuous <Continuous>  dextrose 50% Injectable 12.5 Gram(s) IV Push once  dextrose 50% Injectable 25 Gram(s) IV Push once  dextrose 50% Injectable 25 Gram(s) IV Push once  doxazosin 6 milliGRAM(s) Oral <User Schedule>  epoetin odalis (EPOGEN) Injectable 28646 Unit(s) SubCutaneous <User Schedule>  ferrous    sulfate Liquid 300 milliGRAM(s) Enteral Tube daily  glucagon  Injectable 1 milliGRAM(s) IntraMuscular once  insulin lispro (ADMELOG) corrective regimen sliding scale   SubCutaneous every 6 hours  insulin NPH human recombinant 3 Unit(s) SubCutaneous every 6 hours  meropenem  IVPB 500 milliGRAM(s) IV Intermittent every 12 hours  mupirocin 2% Ointment 1 Application(s) Both Nostrils two times a day  pantoprazole  Injectable 40 milliGRAM(s) IV Push two times a day  psyllium Powder 1 Packet(s) Oral daily  sodium chloride 2 Gram(s) Oral two times a day    VITALS:  T(C): , Max: 38.3 (09-20-23 @ 07:43)  T(F): , Max: 101 (09-20-23 @ 07:43)  HR: 104 (09-20-23 @ 10:33)  BP: 127/45 (09-20-23 @ 03:21)  RR: 19 (09-19-23 @ 22:54)  SpO2: 94% (09-20-23 @ 10:33)    I and O's:    09-19 @ 07:01  -  09-20 @ 07:00  --------------------------------------------------------  IN: 2168 mL / OUT: 531 mL / NET: 1637 mL    PHYSICAL EXAM:  Constitutional: trach to vent  HEENT: + tracheostomy, + NGT  Respiratory: Coarse BS  Cardiovascular: S1 and S2  Gastrointestinal: BS+, soft, NT/ND  Extremities: + peripheral edema  Neurological: UTO  : +external catheter   Skin: No rashes  Access: [facility] HD catheter (8/19) - c/d/i    LABS:                        8.1    4.64  )-----------( 171      ( 20 Sep 2023 06:02 )             24.8     09-20    125<L>  |  91<L>  |  103<H>  ----------------------------<  230<H>  4.6   |  23  |  2.52<H>    Ca    8.3<L>      20 Sep 2023 06:02  Phos  3.7     09-20  Mg     2.40     09-20    TPro  4.9<L>  /  Alb  1.6<L>  /  TBili  0.2  /  DBili  x   /  AST  38<H>  /  ALT  13  /  AlkPhos  315<H>  09-20    Urine Studies:  Urinalysis Basic - ( 20 Sep 2023 06:02 )    Color: x / Appearance: x / SG: x / pH: x  Gluc: 230 mg/dL / Ketone: x  / Bili: x / Urobili: x   Blood: x / Protein: x / Nitrite: x   Leuk Esterase: x / RBC: x / WBC x   Sq Epi: x / Non Sq Epi: x / Bacteria: x

## 2023-09-20 NOTE — PROGRESS NOTE ADULT - SUBJECTIVE AND OBJECTIVE BOX
Follow Up:  MSSA bacteremia    Interval History: afebrile but HD was continued only for 25 minutes as she had bleeding from the HD cath site    ROS:    Unobtainable because: trached          Allergies  isoniazid (Rash)  nafcillin (Unknown)  hydrALAZINE (Rash)  vitamin E (Short breath; Urticaria; Hives)  doxycycline (Rash)  cefepime (Rash)  NIFEdipine (Urticaria; Hives)        ANTIMICROBIALS:  meropenem  IVPB 500 every 12 hours      OTHER MEDS:  acetaminophen   Oral Liquid .. 650 milliGRAM(s) Oral every 6 hours PRN  albuterol/ipratropium for Nebulization 3 milliLiter(s) Nebulizer every 6 hours  atorvastatin 40 milliGRAM(s) Oral at bedtime  buMETAnide Injectable 2 milliGRAM(s) IV Push two times a day  chlorhexidine 0.12% Liquid 15 milliLiter(s) Oral Mucosa every 12 hours  chlorhexidine 2% Cloths 1 Application(s) Topical daily  dextrose 5%. 1000 milliLiter(s) IV Continuous <Continuous>  dextrose 5%. 1000 milliLiter(s) IV Continuous <Continuous>  dextrose 50% Injectable 12.5 Gram(s) IV Push once  dextrose 50% Injectable 25 Gram(s) IV Push once  dextrose 50% Injectable 25 Gram(s) IV Push once  dextrose Oral Gel 15 Gram(s) Oral once PRN  doxazosin 6 milliGRAM(s) Oral <User Schedule>  epoetin odalis (EPOGEN) Injectable 16923 Unit(s) SubCutaneous <User Schedule>  ferrous    sulfate Liquid 300 milliGRAM(s) Enteral Tube daily  glucagon  Injectable 1 milliGRAM(s) IntraMuscular once  insulin lispro (ADMELOG) corrective regimen sliding scale   SubCutaneous every 6 hours  insulin NPH human recombinant 3 Unit(s) SubCutaneous every 6 hours  mupirocin 2% Ointment 1 Application(s) Both Nostrils two times a day  pantoprazole  Injectable 40 milliGRAM(s) IV Push two times a day  psyllium Powder 1 Packet(s) Oral daily  sodium chloride 2 Gram(s) Oral two times a day      Vital Signs Last 24 Hrs  T(C): 38.1 (20 Sep 2023 10:07), Max: 38.3 (20 Sep 2023 07:43)  T(F): 100.5 (20 Sep 2023 10:07), Max: 101 (20 Sep 2023 07:43)  HR: 104 (20 Sep 2023 10:33) (98 - 107)  BP: 127/45 (20 Sep 2023 03:21) (92/37 - 127/45)  BP(mean): --  RR: 19 (19 Sep 2023 22:54) (17 - 21)  SpO2: 94% (20 Sep 2023 10:33) (92% - 100%)    Parameters below as of 20 Sep 2023 10:05  Patient On (Oxygen Delivery Method): ventilator        Physical Exam:  General:    trached   ENT: L ear with whitish crusted drainage  Respiratory:   trach on vent  abd:   soft, not tender  :     no CVAT, no suprapubic tenderness, no willard  Musculoskeletal : no joint swelling  Skin:    no rash now  vascular: SHAKIRA odom                                     8.1    4.64  )-----------( 171      ( 20 Sep 2023 06:02 )             24.8       09-20    125<L>  |  91<L>  |  103<H>  ----------------------------<  230<H>  4.6   |  23  |  2.52<H>    Ca    8.3<L>      20 Sep 2023 06:02  Phos  3.7     09-20  Mg     2.40     09-20    TPro  4.9<L>  /  Alb  1.6<L>  /  TBili  0.2  /  DBili  x   /  AST  38<H>  /  ALT  13  /  AlkPhos  315<H>  09-20      Urinalysis Basic - ( 20 Sep 2023 06:02 )    Color: x / Appearance: x / SG: x / pH: x  Gluc: 230 mg/dL / Ketone: x  / Bili: x / Urobili: x   Blood: x / Protein: x / Nitrite: x   Leuk Esterase: x / RBC: x / WBC x   Sq Epi: x / Non Sq Epi: x / Bacteria: x        MICROBIOLOGY:  Vancomycin Level, Random: 20.6 ug/mL (09-20-23 @ 06:02)  v  Ear Ear  09-13-23   Moderate Candida albicans "Susceptibilities not performed"  --  --      .Blood Blood-Venous  09-10-23   No growth at 5 days  --  --      .Sputum Sputum  09-10-23   Normal Respiratory Cate present  --    Rare polymorphonuclear leukocytes per low power field  No Squamous epithelial cells per low power field  Rare Yeast like cells seen per oil power field  Rare Gram Positive Cocci in Clusters seen per oil power field      .Blood Blood-Peripheral  09-10-23   No growth at 5 days  --  --      .Sputum Sputum  09-08-23   Normal Respiratory Cate present  --    Numerous polymorphonuclear leukocytes per low power field  Numerous Squamous epithelial cells per low power field  Few Yeast like cells per oil power field  Results consistent with oropharyngeal contamination      .Blood Blood-Peripheral  09-08-23   No growth at 5 days  --  --      Ear Ear  09-06-23   No growth at 5 days  --  --      .Blood Blood-Peripheral  09-04-23   No growth at 5 days  --  --      .Bronchial Bronchial Lavage  09-01-23   Numerous Staphylococcus aureus Multiple Morphological Strains  Normal Respiratory Cate present  --  Staphylococcus aureus  Staphylococcus aureus      .Blood Blood-Peripheral  09-01-23   Growth in aerobic and anaerobic bottles: Staphylococcus aureus  Direct identification is available within approximately 3-5  hours either by Blood Panel Multiplexed PCR or Direct  MALDI-TOF. Details: [email or web address]  Growth in anaerobic bottle: blood culture bottle turned positive after  the final report was released.  --  Blood Culture PCR  Staphylococcus aureus      ET Tube ET Tube  09-01-23   Moderate Staphylococcus aureus  Moderate Escherichia coli ESBL  Normal Respiratory Cate present  --  Staphylococcus aureus  Escherichia coli ESBL                RADIOLOGY:  Images independently visualized and reviewed personally, findings as below  < from: CT Internal Auditory Canals No Cont (09.12.23 @ 17:13) >  IMPRESSION:    1. Acute on chronic left-sided otitis externa and acute on chronic   left-sided otomastoiditis. 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 Superimposed cholesteatoma formation within the left   tympanomastoid cavity cannot be excluded. No evidence of malignant otitis   externa.    2. Chronic right-sided mastoiditis.    < end of copied text >

## 2023-09-20 NOTE — PROGRESS NOTE ADULT - ASSESSMENT
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 family declined renal biopsy started on prednisone  s/p trach 9/1 and BAL with MSSA   ET cx with ESBL and MSSA  started on ana cristina 9/1  blood cx 9/1: MSSA   repeat negative 9/4, 9/8  CXR with b/l opacities, R increased  L ear edema and otitis externa    initial  resp failure for ?fluid ovrer load but also had MSSA in the sputum, got vanco, aztreonam one day then cefepime and had rash, renal failure which was considered due to cefepime and then received 2 days of cefazolin and then off, now with trach and bronc on 9/1 with MSSA bacteremia and BAL also MSSA  another ET cx with MSSA and ESBL E-coli  hypotension, MSSA bacteremia likely due to pneumonia, repeat blood cx negative 9/4, TTE limited unable to exclude endocarditis  L otitis externa on ana cristina since 9/1 but worsening edema and black discoloration with bullae forming and persistent fever (ear cx was negative)  Ct showed acute on chronic otitis externa and otomastoiditis , superimposed cholesteatoma can not be excluded, no malignant otitis externa  pt again febrile while ear  improved, cultures negative, but with bleeding from HD site  * repeat blood cx  * would do chest/abd/pelvis CT with contrast  * s/p debridement of necrotic tissue by ENT, cx with candida albicans likely just skin richa, has some crusted white drainage would f/u with ENT  * c/w ana cristina, started 9/1  * cannot do cefazolin so will have to treat with vanco  * c/w vanco per level  * will need a 4 week course of vanco for the MSSA bacteremia from the negative blood cx through 10/2  * monitor CBC/diff and renal function    The above assessment and plan was discussed with the primary team    Yumiko Conner MD  contact on teams  After 5pm and on weekends call [phone]

## 2023-09-20 NOTE — PROGRESS NOTE ADULT - SUBJECTIVE AND OBJECTIVE BOX
INTERVAL HX:  ENT called to re-examine patient due to continued drainage from left ear.   Last ear cx 9/13 with candida, 9/6 with no growth   CT T bone 9/12 with opacification of the bilateral middle ears and mastoids, left mastoid cavity appears underdeveloped. Left ossicles appear eroded though it is a poor scan due to movement.     Vital Signs Last 24 Hrs  T(C): 38.1 (20 Sep 2023 10:07), Max: 38.3 (20 Sep 2023 07:43)  T(F): 100.5 (20 Sep 2023 10:07), Max: 101 (20 Sep 2023 07:43)  HR: 104 (20 Sep 2023 10:33) (98 - 106)  BP: 127/45 (20 Sep 2023 03:21) (92/37 - 127/45)  BP(mean): --  RR: 19 (19 Sep 2023 22:54) (17 - 21)  SpO2: 94% (20 Sep 2023 10:33) (92% - 100%)    Parameters below as of 20 Sep 2023 10:05  Patient On (Oxygen Delivery Method): ventilator          NAD, lying in bed  Trach on place on mech vent  AD: EAC clear, TM retracted but intact, no drainage  AS: pinna firm and crusted with overlying white exudate that extends into the canal. Canal is overall patent with small view of TM. Black spores seen along the EAC. Small view of TM obstructed by possible granulation tissue    A/P:   73y Female with chronic left sided OE and likely pressure injury to the pinna. She is currently on IV abx and has received a course of drops. Minimal drainage today as acute infection appears to have resolved, but she does have black spores indicative of a possible fungal infection.     - acetic acid drops BID to left ear   - mupirocin ointment to the left pinna BID, can also cover with xeroform after placing mupirocin ointment   - pressure offloading of the left ear  - consider wound care consult   - will re-eval in a few days

## 2023-09-20 NOTE — PROGRESS NOTE ADULT - ASSESSMENT
74 y/o F well known to me from my housecal outptn practice. she was admitted at [facility] 7/12-7/22 w aspiration PNA, was treated w CEFEPIME, developed an allergic rash,  dCHF, + MAC on AFB culture, had been progressively getting more and more lethargic and dyspneic at home since DC.   In  am of 8/11/23  ptn presented with respiratory distress w hypoxia and hypercarbia requiring intubation 2/2 volume overload +/- Asp PNA      Neuro   responds appropriately   Baseline MS AOx3, aphasic   - h/o CVA , on aspirin and statin . resumed w feeding tube, ASA resumed 8/26  - eeg  2/2 tremors, no sz focus  - less responsive in the past few days  - MRI 8/17:  new R cerebellar infarct, old left PCA/Occipital infarct. 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 Mcnulty, now in RCU  s/p septic shock overnight 9/1, now on meropenem, HDS  s/p trache, on vent support, copious secretions      Renal   Hyperkalemia with EKG changes  , initially treated w LOKELMA,  now resolved   Ptn well know to Dr. Colon, consult reviewed    HD 8/19,  8/21, 8/26 and 8/28.  s/p PUF 8/29 2.5 Liters, HD 8/30,  9/1, 9/4  started chronic HD 9/7,   cardura resumed  on diuretics  hyponatremic  ptn is less responsive, its possible she is uremic, unable to comple HD 9/19 2/2 bleeding at [facility], HD planned today and tomorrow. on salt tabs, BUMEX IV as per renal,       GI  NPO  on tube feeds  coffee ground emesis x 1 8/24, melena overnight 8/31, s/p 1UPRBC, EGD/Colonoscopy: erosive gastropathy, esophagisits, on colonoscopy old blood seen no active bleeding, poor prep  family to decide re PEG placement    Endocrine  T2DM   A1C 7.1 in 7/2023   - BG goal 140-200     ID   No fever/leukocytosis, recheck temp   Hx latent TB which was treated, no concern for TB   - grew MAC on AFB culture from most recent admission. at [facility]  -MSSA Bacteremia 9/1, allergic to cephalosprins and PCN, on Vanco as per ID, renal dosing,   - sputum also grew E.Coli-ESBL, as per ID recs for  Bradford through 9/7( 1 week course as per ID) , afebrile.  - 9/8:  spike  temp 38.1C, has O. externa, has a wick, recs are to get a CT to R/O an abscess/bony involvement. cont Meropenem  9/9: ptn w fever overnight to 100.8,  may need shiley pulled if bacteremic, needs NECK CT as per ENT to R/O Mastoid bone involvement while having ongoing purulent DC from L ear 2/2 O. externa, getting daily wick changes  9/10:  spiked 102 overnight through Bradford and Vanco, purulent DC from O. externa, ENT recommend Ct of mastoid. ptn in HD, has Shiley in place, consider pulling shiley and send for Cx. would d/w Renal, and consider contacting  ID, last seen by Dr. Conner 9/6 9/11: remains febrile, Dr. Conner reconsulted. cont Bradford, Vanco  9/12: tmax 100.5, fever curve is improving, awaiting Left ear CT, on Bradford since 9/1. on  vanco for MSSA Bacteremia, does not tolerate cephalosporins. through 10/2  9/13: on full vent support via trache, appears uncomfortable and onur 2/2 Left O. externa. has an incidental left middle ear tumor, no acute middle ear interventions recommended, left ear wick replaced. on Meropenem, cx from Ear DC is neg. On Vanco for MSSA bacteremia through 10/2, course of Meropenem as per ID, afebrile in the past 24 hrs . Cardura for HTN resumed, HGB dropped to 6.4, got a dose of EPO and 1UPRBC, rpt 7.8, remains on HEPARIN drip for LEFT popliteal DVT  9/14: cont on Mupirocin locally to Left ear, cont IV Bradford, ENT signed off, afebrile x 48 hrs, Mro course to be determined by ID, on Vanco for MSSA bacteremia through 10/2. ongoing worsening hyponatremia, Hypertonic saline as per renal, no HD today, s/p 1UPRBC 9/13  9/15: spiking temps again, if cont to spike as per ID re PAN scan. cont Vanco for MSSA Bacteremia  9/16-18: no temp overnight  afebrile, cont to have Left ear DC,   on Vanco and ,bradford through 10/2      Heme/Onc  ppx: place on SCD  Transfuse for hgb 6.4, no obv bleed. HDS  LEFT POPLITEAL VEIN ACUTE DVT on 9/10    Ethics  GOC - Discussed GOC with daughter and , they have opted in the past for full code. and she remains full code at present

## 2023-09-20 NOTE — PROGRESS NOTE ADULT - SUBJECTIVE AND OBJECTIVE BOX
Patient is a 73y old  Female who presents with a chief complaint of Respiratory distress (20 Sep 2023 14:15)      SUBJECTIVE / OVERNIGHT EVENTS: unable to comple HD  2/2 bleeding at [facility], on salt tabs, BUMEX IV as per renal, afebrile, cont to have Left ear DC,     MEDICATIONS  (STANDING):  acetic acid 0.25% Topical Irrigation 1 Application(s) Topical two times a day  albuterol/ipratropium for Nebulization 3 milliLiter(s) Nebulizer every 6 hours  atorvastatin 40 milliGRAM(s) Oral at bedtime  buMETAnide Injectable 2 milliGRAM(s) IV Push two times a day  chlorhexidine 0.12% Liquid 15 milliLiter(s) Oral Mucosa every 12 hours  chlorhexidine 2% Cloths 1 Application(s) Topical daily  dextrose 5%. 1000 milliLiter(s) (50 mL/Hr) IV Continuous <Continuous>  dextrose 5%. 1000 milliLiter(s) (100 mL/Hr) IV Continuous <Continuous>  dextrose 50% Injectable 12.5 Gram(s) IV Push once  dextrose 50% Injectable 25 Gram(s) IV Push once  dextrose 50% Injectable 25 Gram(s) IV Push once  doxazosin 6 milliGRAM(s) Oral <User Schedule>  epoetin odalis (EPOGEN) Injectable 55998 Unit(s) SubCutaneous <User Schedule>  ferrous    sulfate Liquid 300 milliGRAM(s) Enteral Tube daily  glucagon  Injectable 1 milliGRAM(s) IntraMuscular once  insulin lispro (ADMELOG) corrective regimen sliding scale   SubCutaneous every 6 hours  insulin NPH human recombinant 3 Unit(s) SubCutaneous every 6 hours  meropenem  IVPB 500 milliGRAM(s) IV Intermittent every 12 hours  mupirocin 2% Ointment 1 Application(s) Topical two times a day  mupirocin 2% Ointment 1 Application(s) Both Nostrils two times a day  pantoprazole  Injectable 40 milliGRAM(s) IV Push two times a day  psyllium Powder 1 Packet(s) Oral daily  sodium chloride 2 Gram(s) Oral two times a day    MEDICATIONS  (PRN):  acetaminophen   Oral Liquid .. 650 milliGRAM(s) Oral every 6 hours PRN Temp greater or equal to 38C (100.4F), Mild Pain (1 - 3)  dextrose Oral Gel 15 Gram(s) Oral once PRN Blood Glucose LESS THAN 70 milliGRAM(s)/deciliter      Vital Signs Last 24 Hrs  T(F): 99.8 (23 @ 19:01), Max: 101 (23 @ 07:43)  HR: 114 (23 @ 19:13) (98 - 114)  BP: 121/45 (23 @ 19:01) (92/37 - 146/31)  RR: 17 (23 @ 19:01) (17 - 19)  SpO2: 100% (23 @ 19:13) (93% - 100%)  Telemetry:   CAPILLARY BLOOD GLUCOSE      POCT Blood Glucose.: 203 mg/dL (20 Sep 2023 17:02)  POCT Blood Glucose.: 231 mg/dL (20 Sep 2023 11:25)  POCT Blood Glucose.: 222 mg/dL (20 Sep 2023 05:38)  POCT Blood Glucose.: 188 mg/dL (19 Sep 2023 23:23)    I&O's Summary    19 Sep 2023 07:01  -  20 Sep 2023 07:00  --------------------------------------------------------  IN: 2168 mL / OUT: 531 mL / NET: 1637 mL    20 Sep 2023 07:01  -  20 Sep 2023 22:41  --------------------------------------------------------  IN: 820 mL / OUT: 690 mL / NET: 130 mL        PHYSICAL EXAM:  GENERAL: NAD, well-developed  HEAD:  Atraumatic, Normocephalic  EYES: EOMI, PERRLA, conjunctiva and sclera clear  NECK: Supple, No JVD  CHEST/LUNG: Clear to auscultation bilaterally; No wheeze  HEART: Regular rate and rhythm; No murmurs, rubs, or gallops  ABDOMEN: Soft, Nontender, Nondistended; Bowel sounds present  EXTREMITIES:  2+ Peripheral Pulses, No clubbing, cyanosis, or edema  PSYCH: AAOx3  NEUROLOGY: non-focal  SKIN: No rashes or lesions    LABS:                        8.1    4.64  )-----------( 171      ( 20 Sep 2023 06:02 )             24.8     09-20    125<L>  |  91<L>  |  103<H>  ----------------------------<  230<H>  4.6   |  23  |  2.52<H>    Ca    8.3<L>      20 Sep 2023 06:02  Phos  3.7       Mg     2.40         TPro  4.9<L>  /  Alb  1.6<L>  /  TBili  0.2  /  DBili  x   /  AST  38<H>  /  ALT  13  /  AlkPhos  315<H>      PTT - ( 20 Sep 2023 08:30 )  PTT:43.3 sec      Urinalysis Basic - ( 20 Sep 2023 15:35 )    Color: Yellow / Appearance: Turbid / S.013 / pH: x  Gluc: x / Ketone: Negative mg/dL  / Bili: Negative / Urobili: 0.2 mg/dL   Blood: x / Protein: 100 mg/dL / Nitrite: Negative   Leuk Esterase: Large / RBC: 92 /HPF /  /HPF   Sq Epi: x / Non Sq Epi: 13 /HPF / Bacteria: Moderate /HPF        RADIOLOGY & ADDITIONAL TESTS:    Imaging Personally Reviewed:    Consultant(s) Notes Reviewed:      Care Discussed with Consultants/Other Providers:

## 2023-09-20 NOTE — PROGRESS NOTE ADULT - ASSESSMENT
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 While in MICU patient noted with worsening renal failure requiring HD initiation, CGE/ Melena s/p EGD 8/31 found with esophagitis and erosive gastropathy, new right cerebellar infarct and fevers second to ESBL COLI and MSSA VAP, MSSA bacteremia, left ear otitis externa and drug rxn to cephalosporins Course further complicated by prolonged vent time s/p tracheostomy 9/1 and transferred to RCU 9/3 completed by recurrent fevers with high PIP, respiratory acidosis and concern for volume overloaded.     NEUROLOGY  # Metabolic Encephalopath vs NEW cerebella infarct   - Baseline MS AOX3 with short term memory changes per family however noted with concern for poor mentation in ICU  - MRI (8/17) with NEW right cerebellar infarct and old left PCA/occipital infarcts  - STEPHANIE (8/17) with AV showing two linear mobile echogenic elements attached to valve leaflets consistent with Lambel's excrescence, but no TRINITY thrombus, and lambel's excrescence with uncertain clinical implication.   - EEG (8/11-8/15) with no seizures   - ILR interrogation (9/7) with SR and PACs falsely recorded as AF.   - Attempted to resume ASA for medical management but held given GIB   - Continue on Lipitor for medical management   - Course complicated by questionable head and body shaking 9/8 however prolactin elevated and shakes head yes/no to some questions.   - Lethargy noted, most likely related to rising BUN    CARDIOVASCULAR  # HTN Urgency   # Septic vs vasoplegic shock   - Labile BPs ranging in between SBP 80s-200s and attempted labetalol, however noted with hypotension and bradycardia likely from BB toxicity vs shock state?    - Holding Labetalol and Catapres   - c/w Cardura for now   - Hypotensive in the 80s systolic overnight, requiring Midodrine 20mg x 1 (resolved)   - Stable today, still mildly hypotensive, but no intervention at this time     # Hx of HFpEF with likely ADHF with volume overload.   - ECHO (7/2023) with EF 59 with severe LVH and diastolic dysfunction   - STEPHANIE (8/18) with normal LVRVSF however noted with increased LA pressures and persistent LA septal bowing into RA.   - ECHO (8/11) with EF 60 with mild LVH, normal LVRVSF, and mild ddfxn.  - Remove volume with HD sessions and intermittent bumex pushes as BP allows    - c/w Bumex 2mg IV BID for now - Monitor UOP and electrolytes     HEENT   # Left ear OE   - ENT evaluation (9/7) with no mastoid tenderness, however found with positive swelling and excoriation to left auricle and tenderness to manipulation of auricle. Debrided crusting of auricle and EAC evaluated with edema and unable to visualize TM. EAC debrided and found with watery exudate.   - s/p CiproHC (9/5 - 9/16)   - Continue on Bradford (9/1 - ) as below   - ENT following and ear wick change QD   - Wick out but needs ? Debridement wound care called/fu ent   - ENT recommending CT IAC w/ IV con but family is refusing as concerned given CKD, kidneys wouldn't recover from IV contrast. Spoke with Nephrology, ENT, and patient's  at length. Planned for CT non con and per , decision will be made from there but for now doesn't want to risk further harming kidneys.  - CT IAC showing acute on chronic left-sided otitis externa and acute on chronic left-sided otomastoiditis. Superimposed left-sided tympanosclerosis. Superimposed cholesteatoma formation within the left   tympanomastoid cavity cannot be excluded. No evidence of malignant otitis externa.    # Oral Lesions with questionable Zoster vs Trauma?   - Patient with tongue pain and seen with anterior ulcerations consolidating and crusting and posterior ulceration.  - Case discussed with pathology resident and culture/ cytology sent.   - HSV negative and Path (9/6) with normal appearing epithelial cells singly and in clusters devoid of viral cytopathic effect.   - Continue on magic mouth wash for pain    RESPIRATORY  # AHHRF second to volume overload   - Hx of CKD and HFpEF presented with SOB and hypercarbia second to volume overload requiring intubation and HD initiation   - Vent weaning attempted however limited requiring tracheostomy (9/1) with IP   - Course complicated by PIPs elevated (50s) and respiratory acidosis second to secretions vs volume ?    - Vent adjusted 20/300/5/30 without improvement.   - POCUS (9/8) with BL pleural effusions (large on right and small on left)   - CT CHEST (9/8) with TBM, BL pleural effusions and continued consolidations   - Continue on vent and given TBM increased PEEP (9/9) to 20/300/8/40 with overall improvement   - Continue on Duoneb and HTS for airway clearance   - Continue volume removal with diuresis and aggressive UF sessions.   - Discussing possible thora but appears improved and will monitor.  ]  - Bedside US showing decrease in pleural effusion - hold off thoracentesis 9/10     GI  # UGIB   - CGE x 1 episode on 8/24 and melena x 2 episodes on 8/31 likely residual blood.   - EGD (8/31) found with esophagitis and erosive gastropathy  - Continue on PPI BID through late October and then PPI QD   - No melena     # Dysphagia   - NGT-TF continued   - Pending PEG however needs improvement in infectious status prior to placement.     RENAL  # Progressive CKD   - Presented volume overload and progressive RUSS on CKD (baseline CRE in 2s and mode into the 3s on admission) likely obstruction vs low flow state with shock vs AIN from drug reaction issue?  - POCUS with no signs of hydronephrosis   - Pressor support started with improvement in renal function  - Attempted steroid course in ICU without improvement in renal function   - Case discussed at length with renal and initiated on HD in ICU and now continued on HD TTHS, however remains volume overloaded.   - Patient oliguric however slightly responding to Bumex pushes - does not make enough urine   - Nephro following - Plan to resume HD TIW   - Monitor renal function and UOP, and electrolytes for now  - Monitor urine output - Will perform bladder scan/straight cath as needed if retaining urine     Hyponatremia (improving) Na 123>>125  - c/w Salt tabs - s/p Hypertonic 1.5%NS @ 50cc/hr x 5 hr  - Renal- Will resume HD i/s/o of rising BUN, poor urine output on Bumex IVP     # Hyperphosphatemia (resolved)   - PTH normal and elevated phos likely from renal failure  - s/p Phos binder with Renvela - now d'beth as level wnl      INFECTIOUS DISEASE  # ESBL ECOLI and MSSA VAP c/b MSSA bacteremia   - RVP/ COVID (8/11) negative   - SCx (8/11) with MSSA s/p cefepime (8/12-8/13) and then ancef (8/14) however noted with drug reaction and then changed to vanco and aztreonam (8/12-8/13) in ICU .   - Course complicated by recurrent fevers and return of MSSA with bacteremia.   - SCx (9/1) with MSSA and ESBL ECOLI   - BAL (9/1) with MSSA   - BCx (9/1) with MSSA and cleared on (9/4)   - ECHO (9/6) unable to rule out endocarditis   - Continue on Bradford (9/4 - ) and Vanco BY DOSE POST HD (9/4, 9/7, 9/9 ...) with duration TBD at this time.   - Given inability to rule out endocarditis will discuss possible 4 wks with ID?   - Course complicated by fever (9/7) and UA with leuks but no bacteria, however compared to prior improved, RVP/COVID and SCx negative, and RPT CXR similar to prior   - Pending BCx, SCx, UCx, and LE DOPPLERS- Blood and sputum cx NTD; Acute left deep vein thrombosis of the left popliteal vein.  - Febrile overnight 102 recultured and sent off   -Pt receiving vanco post HD however today given vanco 750mg x 1 will check Vanco level at end of HD session and dose     # Left ear OE   - ENT evaluation (9/7) with no mastoid tenderness, however found with positive swelling and excoriation to left auricle and tenderness to manipulation of auricle. Debrided crusting of auricle and EAC evaluated with edema and unable to visualize TM. EAC debrided and found with watery exudate.   - s/p CiproHC (9/5 - 9/16)   - Continue on Bradford (9/1 - ) as below     # MRSA PCRs   - MRSA PCR (8/11 and 8/14) negative   - Next MRSA + PCR ordered for 10/8     HEME  # Anemia second to GIB vs renal disease   - Hold chemical DVT PPX given bleeding/ waxing and waning HH   - s/p multiple units of PRBCs (8/15, 8/21, 8/28, 8/31 and 9/8)   - Anemia panel with AOCD but with low %sat and ferrous sulfate added to optimize   - Monitor HH and discuss with renal for EPO sometime next week.   - Spoke with GI for clearance to start Heparin gtt for + DVT     Vascular  # DVT  - Pt with + popliteal DVT on SCD Spoke with GI no absolute contraindication for starting Heparin - advise close monitoring for bleeding - Do stat CTA if bleeding occurs and call IR   - Pt specific heparin gtt started with goal PTT 60-85  - will monitor closely     ONC   # Hx of Breast CA   - Patient dx in 2018 and s/p BL mastectomy (radical on right) and chemo and RT.   - Supportive care.     ENDOCRINE  # IDDM2   - Continue on NPH 3U and ISS   - Monitor FS and adjust as needed     LINES/ TUBES  - ANDREIA TAYLOR (8/19 - )     SKIN  # Drug Eruption   - Attempted cefepime (8/12) vs ancef (8/13-8/14) and then noted with drug rxn.   - Hold further cephalosporins at this time   - s/p Steroids with prednisone 40 (8/18 - 9/2) then prednisone 30 (9/3-9/7), then prednisone 20 (9/8 - 9/10), then prednisone 10mg (9/11-9/13) then turn off.     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 While in MICU patient noted with worsening renal failure requiring HD initiation, CGE/ Melena s/p EGD 8/31 found with esophagitis and erosive gastropathy, new right cerebellar infarct and fevers second to ESBL COLI and MSSA VAP, MSSA bacteremia, left ear otitis externa and drug rxn to cephalosporins Course further complicated by prolonged vent time s/p tracheostomy 9/1 and transferred to RCU 9/3 completed by recurrent fevers with high PIP, respiratory acidosis and concern for volume overloaded.     NEUROLOGY  # Metabolic Encephalopath vs NEW cerebella infarct   - Baseline MS AOX3 with short term memory changes per family however noted with concern for poor mentation in ICU  - MRI (8/17) with NEW right cerebellar infarct and old left PCA/occipital infarcts  - STEPHANIE (8/17) with AV showing two linear mobile echogenic elements attached to valve leaflets consistent with Lambel's excrescence, but no TRINITY thrombus, and lambel's excrescence with uncertain clinical implication.   - EEG (8/11-8/15) with no seizures   - ILR interrogation (9/7) with SR and PACs falsely recorded as AF.   - Attempted to resume ASA for medical management but held given GIB   - Continue on Lipitor for medical management   - Course complicated by questionable head and body shaking 9/8 however prolactin elevated and shakes head yes/no to some questions.   - Lethargy noted, most likely related to rising BUN    CARDIOVASCULAR  # HTN Urgency   # Septic vs vasoplegic shock   - Labile BPs ranging in between SBP 80s-200s and attempted labetalol, however noted with hypotension and bradycardia likely from BB toxicity vs shock state?    - Holding Labetalol and Catapres   - c/w Cardura for now   - Hypotensive in the 80s systolic overnight, requiring Midodrine 20mg x 1 (resolved)   - Stable today, still mildly hypotensive, but no intervention at this time     # Hx of HFpEF with likely ADHF with volume overload.   - ECHO (7/2023) with EF 59 with severe LVH and diastolic dysfunction   - STEPHANIE (8/18) with normal LVRVSF however noted with increased LA pressures and persistent LA septal bowing into RA.   - ECHO (8/11) with EF 60 with mild LVH, normal LVRVSF, and mild ddfxn.  - Remove volume with HD sessions and intermittent bumex pushes as BP allows    - c/w Bumex 2mg IV BID for now - Monitor UOP and electrolytes     HEENT   # Left ear OE   - ENT evaluation (9/7) with no mastoid tenderness, however found with positive swelling and excoriation to left auricle and tenderness to manipulation of auricle. Debrided crusting of auricle and EAC evaluated with edema and unable to visualize TM. EAC debrided and found with watery exudate.   - s/p CiproHC (9/5 - 9/16)   - Continue on Bradford (9/1 - ) as below   - ENT following and ear wick change QD   - Wick out but needs ? Debridement wound care called/fu ent   - ENT recommending CT IAC w/ IV con but family is refusing as concerned given CKD, kidneys wouldn't recover from IV contrast. Spoke with Nephrology, ENT, and patient's  at length. Planned for CT non con and per , decision will be made from there but for now doesn't want to risk further harming kidneys.  - CT IAC showing acute on chronic left-sided otitis externa and acute on chronic left-sided otomastoiditis. Superimposed left-sided tympanosclerosis. Superimposed cholesteatoma formation within the left tympanomastoid cavity cannot be excluded. No evidence of malignant otitis externa.  - ENT consulted (9/20) for white purulent discharge from left ear     # Oral Lesions with questionable Zoster vs Trauma?   - Patient with tongue pain and seen with anterior ulcerations consolidating and crusting and posterior ulceration.  - Case discussed with pathology resident and culture/ cytology sent.   - HSV negative and Path (9/6) with normal appearing epithelial cells singly and in clusters devoid of viral cytopathic effect.   - Continue on magic mouth wash for pain    RESPIRATORY  # AHHRF second to volume overload   - Hx of CKD and HFpEF presented with SOB and hypercarbia second to volume overload requiring intubation and HD initiation   - Vent weaning attempted however limited requiring tracheostomy (9/1) with IP   - Course complicated by PIPs elevated (50s) and respiratory acidosis second to secretions vs volume ?    - Vent adjusted 20/300/5/30 without improvement.   - POCUS (9/8) with BL pleural effusions (large on right and small on left)   - CT CHEST (9/8) with TBM, BL pleural effusions and continued consolidations   - Continue on vent and given TBM increased PEEP (9/9) to 20/300/8/40 with overall improvement   - Continue on Duoneb and HTS for airway clearance   - Continue volume removal with diuresis and aggressive UF sessions.   - Discussing possible thora but appears improved and will monitor.  ]  - Bedside US showing decrease in pleural effusion - hold off thoracentesis 9/10     GI  # UGIB   - CGE x 1 episode on 8/24 and melena x 2 episodes on 8/31 likely residual blood.   - EGD (8/31) found with esophagitis and erosive gastropathy  - Continue on PPI BID through late October and then PPI QD   - No melena     # Dysphagia   - NGT-TF continued   - Pending PEG however needs improvement in infectious status prior to placement.     RENAL  # Progressive CKD   - Presented volume overload and progressive RUSS on CKD (baseline CRE in 2s and mode into the 3s on admission) likely obstruction vs low flow state with shock vs AIN from drug reaction issue?  - POCUS with no signs of hydronephrosis   - Pressor support started with improvement in renal function  - Attempted steroid course in ICU without improvement in renal function   - Case discussed at length with renal and initiated on HD in ICU and now continued on HD TTHS, however remains volume overloaded.   - Patient oliguric however slightly responding to Bumex pushes - does not make enough urine   - Nephro following - Plan to resume HD TIW   - Monitor renal function and UOP, and electrolytes for now  - Monitor urine output - Will perform bladder scan/straight cath as needed if retaining urine     Hyponatremia (improving) Na 123>>125  - c/w Salt tabs - s/p Hypertonic 1.5%NS @ 50cc/hr x 5 hr  - Renal- Will resume HD i/s/o of rising BUN, poor urine output on Bumex IVP     # Hyperphosphatemia (resolved)   - PTH normal and elevated phos likely from renal failure  - s/p Phos binder with Renvela - now d'beth as level wnl      INFECTIOUS DISEASE  # ESBL ECOLI and MSSA VAP c/b MSSA bacteremia   - RVP/ COVID (8/11) negative   - SCx (8/11) with MSSA s/p cefepime (8/12-8/13) and then ancef (8/14) however noted with drug reaction and then changed to vanco and aztreonam (8/12-8/13) in ICU .   - Course complicated by recurrent fevers and return of MSSA with bacteremia.   - SCx (9/1) with MSSA and ESBL ECOLI   - BAL (9/1) with MSSA   - BCx (9/1) with MSSA and cleared on (9/4)   - ECHO (9/6) unable to rule out endocarditis   - Continue on Bradford (9/4 - ) and Vanco BY DOSE POST HD (9/4, 9/7, 9/9 ...) with duration TBD at this time.   - Given inability to rule out endocarditis will discuss possible 4 wks with ID?   - Course complicated by fever (9/7) and UA with leuks but no bacteria, however compared to prior improved, RVP/COVID and SCx negative, and RPT CXR similar to prior   - LE DOPPLERS- Blood and sputum cx NTD; Acute left deep vein thrombosis of the left popliteal vein.  - Febrile overnight 102 recultured and sent off   -Pt receiving vanco post HD however today given vanco 750mg x 1 will check Vanco level at end of HD session and dose   - BCx (9/20): sent, pending result    # Left ear OE   - ENT evaluation (9/7) with no mastoid tenderness, however found with positive swelling and excoriation to left auricle and tenderness to manipulation of auricle. Debrided crusting of auricle and EAC evaluated with edema and unable to visualize TM. EAC debrided and found with watery exudate.   - s/p CiproHC (9/5 - 9/16)     # MRSA PCRs   - MRSA PCR (8/11 and 8/14) negative   - Next MRSA + PCR ordered for 10/8     HEME  # Anemia second to GIB vs renal disease   - Hold chemical DVT PPX given bleeding/ waxing and waning HH   - s/p multiple units of PRBCs (8/15, 8/21, 8/28, 8/31 and 9/8)   - Anemia panel with AOCD but with low %sat and ferrous sulfate added to optimize   - Monitor HH and discuss with renal for EPO sometime next week.   - Spoke with GI for clearance to start Heparin gtt for + DVT   - hold heparin gtt d/t bleeding around shifabi when started on HD (9/20)    Vascular  # DVT  - Pt with + popliteal DVT on SCD Spoke with GI no absolute contraindication for starting Heparin - advise close monitoring for bleeding - Do stat CTA if bleeding occurs and call IR   - Pt specific heparin gtt started with goal PTT 60-85  - will monitor closely   - hold heparin gtt d/t bleeding around shifabi when started on HD (9/20)  - cards: consider ?IVC filter    ONC   # Hx of Breast CA   - Patient dx in 2018 and s/p BL mastectomy (radical on right) and chemo and RT.   - Supportive care.     ENDOCRINE  # IDDM2   - Continue on NPH 3U and ISS   - Monitor FS and adjust as needed     LINES/ TUBES  - R IJ SHIFABI (8/19 - )     SKIN  # Drug Eruption   - Attempted cefepime (8/12) vs ancef (8/13-8/14) and then noted with drug rxn.   - Hold further cephalosporins at this time   - s/p Steroids with prednisone 40 (8/18 - 9/2) then prednisone 30 (9/3-9/7), then prednisone 20 (9/8 - 9/10), then prednisone 10mg (9/11-9/13) then turn off.     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 While in MICU patient noted with worsening renal failure requiring HD initiation, CGE/ Melena s/p EGD 8/31 found with esophagitis and erosive gastropathy, new right cerebellar infarct and fevers second to ESBL COLI and MSSA VAP, MSSA bacteremia, left ear otitis externa and drug rxn to cephalosporins Course further complicated by prolonged vent time s/p tracheostomy 9/1 and transferred to RCU 9/3 completed by recurrent fevers with high PIP, respiratory acidosis and concern for volume overloaded.     NEUROLOGY  # Metabolic Encephalopath vs NEW cerebella infarct   - Baseline MS AOX3 with short term memory changes per family however noted with concern for poor mentation in ICU  - MRI (8/17) with NEW right cerebellar infarct and old left PCA/occipital infarcts  - STEPHANIE (8/17) with AV showing two linear mobile echogenic elements attached to valve leaflets consistent with Lambel's excrescence, but no TRINITY thrombus, and lambel's excrescence with uncertain clinical implication.   - EEG (8/11-8/15) with no seizures   - ILR interrogation (9/7) with SR and PACs falsely recorded as AF.   - Attempted to resume ASA for medical management but held given GIB   - Continue on Lipitor for medical management   - Course complicated by questionable head and body shaking 9/8 however prolactin elevated and shakes head yes/no to some questions.   - Lethargy noted, most likely related to rising BUN    CARDIOVASCULAR  # HTN Urgency   # Septic vs vasoplegic shock   - Labile BPs ranging in between SBP 80s-200s and attempted labetalol, however noted with hypotension and bradycardia likely from BB toxicity vs shock state?    - Holding Labetalol and Catapres   - c/w Cardura for now   - Hypotensive in the 80s systolic overnight, requiring Midodrine 20mg x 1 (resolved)   - Stable today, still mildly hypotensive, but no intervention at this time     # Hx of HFpEF with likely ADHF with volume overload.   - ECHO (7/2023) with EF 59 with severe LVH and diastolic dysfunction   - STEPHANIE (8/18) with normal LVRVSF however noted with increased LA pressures and persistent LA septal bowing into RA.   - ECHO (8/11) with EF 60 with mild LVH, normal LVRVSF, and mild ddfxn.  - Remove volume with HD sessions and intermittent bumex pushes as BP allows    - c/w Bumex 2mg IV BID for now - Monitor UOP and electrolytes     HEENT   # Left ear OE   - ENT evaluation (9/7) with no mastoid tenderness, however found with positive swelling and excoriation to left auricle and tenderness to manipulation of auricle. Debrided crusting of auricle and EAC evaluated with edema and unable to visualize TM. EAC debrided and found with watery exudate.   - s/p CiproHC (9/5 - 9/16)   - Continue on Bradford (9/1 - ) as below   - ENT following and ear wick change QD   - Wick out but needs ? Debridement wound care called/fu ent   - ENT recommending CT IAC w/ IV con but family is refusing as concerned given CKD, kidneys wouldn't recover from IV contrast. Spoke with Nephrology, ENT, and patient's  at length. Planned for CT non con and per , decision will be made from there but for now doesn't want to risk further harming kidneys.  - CT IAC showing acute on chronic left-sided otitis externa and acute on chronic left-sided otomastoiditis. Superimposed left-sided tympanosclerosis. Superimposed cholesteatoma formation within the left tympanomastoid cavity cannot be excluded. No evidence of malignant otitis externa.  - ENT consulted (9/20) for white purulent discharge from left ear     # Oral Lesions with questionable Zoster vs Trauma?   - Patient with tongue pain and seen with anterior ulcerations consolidating and crusting and posterior ulceration.  - Case discussed with pathology resident and culture/ cytology sent.   - HSV negative and Path (9/6) with normal appearing epithelial cells singly and in clusters devoid of viral cytopathic effect.   - Continue on magic mouth wash for pain    RESPIRATORY  # AHHRF second to volume overload   - Hx of CKD and HFpEF presented with SOB and hypercarbia second to volume overload requiring intubation and HD initiation   - Vent weaning attempted however limited requiring tracheostomy (9/1) with IP   - Course complicated by PIPs elevated (50s) and respiratory acidosis second to secretions vs volume ?    - Vent adjusted 20/300/5/30 without improvement.   - POCUS (9/8) with BL pleural effusions (large on right and small on left)   - CT CHEST (9/8) with TBM, BL pleural effusions and continued consolidations   - Continue on vent and given TBM increased PEEP (9/9) to 20/300/8/40 with overall improvement   - Continue on Duoneb and HTS for airway clearance   - Continue volume removal with diuresis and aggressive UF sessions.   - Discussing possible thora but appears improved and will monitor.  ]  - Bedside US showing decrease in pleural effusion - hold off thoracentesis 9/10     GI  # UGIB   - CGE x 1 episode on 8/24 and melena x 2 episodes on 8/31 likely residual blood.   - EGD (8/31) found with esophagitis and erosive gastropathy  - Continue on PPI BID through late October and then PPI QD   - No melena     # Dysphagia   - NGT-TF continued   - Pending PEG however needs improvement in infectious status prior to placement.     RENAL  # Progressive CKD   - Presented volume overload and progressive RUSS on CKD (baseline CRE in 2s and mode into the 3s on admission) likely obstruction vs low flow state with shock vs AIN from drug reaction issue?  - POCUS with no signs of hydronephrosis   - Pressor support started with improvement in renal function  - Attempted steroid course in ICU without improvement in renal function   - Case discussed at length with renal and initiated on HD in ICU and now continued on HD TTHS, however remains volume overloaded.   - Patient oliguric however slightly responding to Bumex pushes - does not make enough urine   - Nephro following - Plan to resume HD TIW   - Monitor renal function and UOP, and electrolytes for now  - Monitor urine output - Will perform bladder scan/straight cath as needed if retaining urine     Hyponatremia (improving) Na 123>>125  - c/w Salt tabs - s/p Hypertonic 1.5%NS @ 50cc/hr x 5 hr  - Renal- Will resume HD i/s/o of rising BUN, poor urine output on Bumex IVP     # Hyperphosphatemia (resolved)   - PTH normal and elevated phos likely from renal failure  - s/p Phos binder with Renvela - now d'beth as level wnl      INFECTIOUS DISEASE  # ESBL ECOLI and MSSA VAP c/b MSSA bacteremia   - RVP/ COVID (8/11) negative   - SCx (8/11) with MSSA s/p cefepime (8/12-8/13) and then ancef (8/14) however noted with drug reaction and then changed to vanco and aztreonam (8/12-8/13) in ICU .   - Course complicated by recurrent fevers and return of MSSA with bacteremia.   - SCx (9/1) with MSSA and ESBL ECOLI   - BAL (9/1) with MSSA   - BCx (9/1) with MSSA and cleared on (9/4)   - ECHO (9/6) unable to rule out endocarditis   - Continue on Bradford (9/4 - ) and Vanco BY DOSE POST HD (9/4, 9/7, 9/9 ...) with duration TBD at this time.   - Given inability to rule out endocarditis will discuss possible 4 wks with ID?   - Course complicated by fever (9/7) and UA with leuks but no bacteria, however compared to prior improved, RVP/COVID and SCx negative, and RPT CXR similar to prior   - LE DOPPLERS- Blood and sputum cx NTD; Acute left deep vein thrombosis of the left popliteal vein.  - Febrile overnight 102 recultured and sent off   -Pt receiving vanco post HD however today given vanco 750mg x 1 will check Vanco level at end of HD session and dose   - BCx (9/20): sent, pending result  - ID recc CT C/A/P w/ IV contrast - on hold d/t unsure if pt tolerate HD alissa    # Left ear OE   - ENT evaluation (9/7) with no mastoid tenderness, however found with positive swelling and excoriation to left auricle and tenderness to manipulation of auricle. Debrided crusting of auricle and EAC evaluated with edema and unable to visualize TM. EAC debrided and found with watery exudate.   - s/p CiproHC (9/5 - 9/16)     # MRSA PCRs   - MRSA PCR (8/11 and 8/14) negative   - Next MRSA + PCR ordered for 10/8     HEME  # Anemia second to GIB vs renal disease   - Hold chemical DVT PPX given bleeding/ waxing and waning HH   - s/p multiple units of PRBCs (8/15, 8/21, 8/28, 8/31 and 9/8)   - Anemia panel with AOCD but with low %sat and ferrous sulfate added to optimize   - Monitor HH and discuss with renal for EPO sometime next week.   - Spoke with GI for clearance to start Heparin gtt for + DVT   - hold heparin gtt d/t bleeding around shiley when started on HD (9/20)    Vascular  # DVT  - Pt with + popliteal DVT on SCD Spoke with GI no absolute contraindication for starting Heparin - advise close monitoring for bleeding - Do stat CTA if bleeding occurs and call IR   - Pt specific heparin gtt started with goal PTT 60-85  - will monitor closely   - hold heparin gtt d/t bleeding around shiley when started on HD (9/20)  - cards: consider ?IVC filter    ONC   # Hx of Breast CA   - Patient dx in 2018 and s/p BL mastectomy (radical on right) and chemo and RT.   - Supportive care.     ENDOCRINE  # IDDM2   - Continue on NPH 3U and ISS   - Monitor FS and adjust as needed     LINES/ TUBES  - R IJ SHILEY (8/19 - )     SKIN  # Drug Eruption   - Attempted cefepime (8/12) vs ancef (8/13-8/14) and then noted with drug rxn.   - Hold further cephalosporins at this time   - s/p Steroids with prednisone 40 (8/18 - 9/2) then prednisone 30 (9/3-9/7), then prednisone 20 (9/8 - 9/10), then prednisone 10mg (9/11-9/13) then turn off.     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 While in MICU patient noted with worsening renal failure requiring HD initiation, CGE/ Melena s/p EGD 8/31 found with esophagitis and erosive gastropathy, new right cerebellar infarct and fevers second to ESBL COLI and MSSA VAP, MSSA bacteremia, left ear otitis externa and drug rxn to cephalosporins Course further complicated by prolonged vent time s/p tracheostomy 9/1 and transferred to RCU 9/3 completed by recurrent fevers with high PIP, respiratory acidosis and concern for volume overloaded.     NEUROLOGY  # Metabolic Encephalopath vs NEW cerebella infarct   - Baseline MS AOX3 with short term memory changes per family however noted with concern for poor mentation in ICU  - MRI (8/17) with NEW right cerebellar infarct and old left PCA/occipital infarcts  - STEPHANIE (8/17) with AV showing two linear mobile echogenic elements attached to valve leaflets consistent with Lambel's excrescence, but no TRINITY thrombus, and lambel's excrescence with uncertain clinical implication.   - EEG (8/11-8/15) with no seizures   - ILR interrogation (9/7) with SR and PACs falsely recorded as AF.   - Attempted to resume ASA for medical management but held given GIB   - Continue on Lipitor for medical management   - Course complicated by questionable head and body shaking 9/8 however prolactin elevated and shakes head yes/no to some questions.   - Lethargy noted, most likely related to rising BUN    CARDIOVASCULAR  # HTN Urgency   # Septic vs vasoplegic shock   - Labile BPs ranging in between SBP 80s-200s and attempted labetalol, however noted with hypotension and bradycardia likely from BB toxicity vs shock state?    - Holding Labetalol and Catapres   - c/w Cardura for now   - Hypotensive in the 80s systolic overnight, requiring Midodrine 20mg x 1 (resolved)   - Stable today, still mildly hypotensive, but no intervention at this time     # Hx of HFpEF with likely ADHF with volume overload.   - ECHO (7/2023) with EF 59 with severe LVH and diastolic dysfunction   - STEPHANIE (8/18) with normal LVRVSF however noted with increased LA pressures and persistent LA septal bowing into RA.   - ECHO (8/11) with EF 60 with mild LVH, normal LVRVSF, and mild ddfxn.  - Remove volume with HD sessions and intermittent bumex pushes as BP allows    - c/w Bumex 2mg IV BID for now - Monitor UOP and electrolytes     HEENT   # Left ear OE   - ENT evaluation (9/7) with no mastoid tenderness, however found with positive swelling and excoriation to left auricle and tenderness to manipulation of auricle. Debrided crusting of auricle and EAC evaluated with edema and unable to visualize TM. EAC debrided and found with watery exudate.   - s/p CiproHC (9/5 - 9/16)   - Continue on Bradford (9/1 - ) as below   - ENT following and ear wick change QD   - Wick out but needs ? Debridement wound care called/fu ent   - ENT recommending CT IAC w/ IV con but family is refusing as concerned given CKD, kidneys wouldn't recover from IV contrast. Spoke with Nephrology, ENT, and patient's  at length. Planned for CT non con and per , decision will be made from there but for now doesn't want to risk further harming kidneys.  - CT IAC showing acute on chronic left-sided otitis externa and acute on chronic left-sided otomastoiditis. Superimposed left-sided tympanosclerosis. Superimposed cholesteatoma formation within the left tympanomastoid cavity cannot be excluded. No evidence of malignant otitis externa.  - ENT consulted (9/20) for white purulent discharge from left ear, recc: ENT:  acetic acid drops BID to left ear. mupirocin ointment to the left pinna BID, cover with xeroform after placing mupirocin ointment. pressure offloading of the left ear. wound care consulted for left pinna pressure injury.     # Oral Lesions with questionable Zoster vs Trauma?   - Patient with tongue pain and seen with anterior ulcerations consolidating and crusting and posterior ulceration.  - Case discussed with pathology resident and culture/ cytology sent.   - HSV negative and Path (9/6) with normal appearing epithelial cells singly and in clusters devoid of viral cytopathic effect.   - Continue on magic mouth wash for pain    RESPIRATORY  # AHHRF second to volume overload   - Hx of CKD and HFpEF presented with SOB and hypercarbia second to volume overload requiring intubation and HD initiation   - Vent weaning attempted however limited requiring tracheostomy (9/1) with IP   - Course complicated by PIPs elevated (50s) and respiratory acidosis second to secretions vs volume ?    - Vent adjusted 20/300/5/30 without improvement.   - POCUS (9/8) with BL pleural effusions (large on right and small on left)   - CT CHEST (9/8) with TBM, BL pleural effusions and continued consolidations   - Continue on vent and given TBM increased PEEP (9/9) to 20/300/8/40 with overall improvement   - Continue on Duoneb and HTS for airway clearance   - Continue volume removal with diuresis and aggressive UF sessions.   - Discussing possible thora but appears improved and will monitor.  ]  - Bedside US showing decrease in pleural effusion - hold off thoracentesis 9/10     GI  # UGIB   - CGE x 1 episode on 8/24 and melena x 2 episodes on 8/31 likely residual blood.   - EGD (8/31) found with esophagitis and erosive gastropathy  - Continue on PPI BID through late October and then PPI QD   - No melena     # Dysphagia   - NGT-TF continued   - Pending PEG however needs improvement in infectious status prior to placement.     RENAL  # Progressive CKD   - Presented volume overload and progressive RUSS on CKD (baseline CRE in 2s and mode into the 3s on admission) likely obstruction vs low flow state with shock vs AIN from drug reaction issue?  - POCUS with no signs of hydronephrosis   - Pressor support started with improvement in renal function  - Attempted steroid course in ICU without improvement in renal function   - Case discussed at length with renal and initiated on HD in ICU and now continued on HD TTHS, however remains volume overloaded.   - Patient oliguric however slightly responding to Bumex pushes - does not make enough urine   - Nephro following - Plan to resume HD TIW   - Monitor renal function and UOP, and electrolytes for now  - Monitor urine output - Will perform bladder scan/straight cath as needed if retaining urine     Hyponatremia (improving) Na 123>>125  - c/w Salt tabs - s/p Hypertonic 1.5%NS @ 50cc/hr x 5 hr  - Renal- Will resume HD i/s/o of rising BUN, poor urine output on Bumex IVP     # Hyperphosphatemia (resolved)   - PTH normal and elevated phos likely from renal failure  - s/p Phos binder with Renvela - now d'beth as level wnl      INFECTIOUS DISEASE  # ESBL ECOLI and MSSA VAP c/b MSSA bacteremia   - RVP/ COVID (8/11) negative   - SCx (8/11) with MSSA s/p cefepime (8/12-8/13) and then ancef (8/14) however noted with drug reaction and then changed to vanco and aztreonam (8/12-8/13) in ICU .   - Course complicated by recurrent fevers and return of MSSA with bacteremia.   - SCx (9/1) with MSSA and ESBL ECOLI   - BAL (9/1) with MSSA   - BCx (9/1) with MSSA and cleared on (9/4)   - ECHO (9/6) unable to rule out endocarditis   - Continue on Bradford (9/4 - ) and Vanco BY DOSE POST HD (9/4, 9/7, 9/9 ...) with duration TBD at this time.   - Given inability to rule out endocarditis will discuss possible 4 wks with ID?   - Course complicated by fever (9/7) and UA with leuks but no bacteria, however compared to prior improved, RVP/COVID and SCx negative, and RPT CXR similar to prior   - LE DOPPLERS- Blood and sputum cx NTD; Acute left deep vein thrombosis of the left popliteal vein.  - Febrile overnight 102 recultured and sent off   -Pt receiving vanco post HD however today given vanco 750mg x 1 will check Vanco level at end of HD session and dose   - BCx (9/20): sent, pending result  - ID recc CT C/A/P w/ IV contrast - on hold d/t unsure if pt tolerate HD alissa    # Left ear OE   - ENT evaluation (9/7) with no mastoid tenderness, however found with positive swelling and excoriation to left auricle and tenderness to manipulation of auricle. Debrided crusting of auricle and EAC evaluated with edema and unable to visualize TM. EAC debrided and found with watery exudate.   - s/p CiproHC (9/5 - 9/16)     # MRSA PCRs   - MRSA PCR (8/11 and 8/14) negative   - Next MRSA + PCR ordered for 10/8     HEME  # Anemia second to GIB vs renal disease   - Hold chemical DVT PPX given bleeding/ waxing and waning HH   - s/p multiple units of PRBCs (8/15, 8/21, 8/28, 8/31 and 9/8)   - Anemia panel with AOCD but with low %sat and ferrous sulfate added to optimize   - Monitor HH and discuss with renal for EPO sometime next week.   - Spoke with GI for clearance to start Heparin gtt for + DVT   - hold heparin gtt d/t bleeding around shiley when started on HD (9/20)    Vascular  # DVT  - Pt with + popliteal DVT on SCD Spoke with GI no absolute contraindication for starting Heparin - advise close monitoring for bleeding - Do stat CTA if bleeding occurs and call IR   - Pt specific heparin gtt started with goal PTT 60-85  - will monitor closely   - hold heparin gtt d/t bleeding around shiley when started on HD (9/20)  - cards: consider ?IVC filter    ONC   # Hx of Breast CA   - Patient dx in 2018 and s/p BL mastectomy (radical on right) and chemo and RT.   - Supportive care.     ENDOCRINE  # IDDM2   - Continue on NPH 3U and ISS   - Monitor FS and adjust as needed     LINES/ TUBES  - R IJ SHILEY (8/19 - )     SKIN  # Drug Eruption   - Attempted cefepime (8/12) vs ancef (8/13-8/14) and then noted with drug rxn.   - Hold further cephalosporins at this time   - s/p Steroids with prednisone 40 (8/18 - 9/2) then prednisone 30 (9/3-9/7), then prednisone 20 (9/8 - 9/10), then prednisone 10mg (9/11-9/13) then turn off.     ETHICS/ GOC    - FULL CODE     DISPO - TBD

## 2023-09-20 NOTE — PROGRESS NOTE ADULT - ASSESSMENT
IMPRESSION: 73F w/ HTN, DM2, CVA, breast CA-bilateral mastectomy, recurrent aspiration pneumonia/respiratory failure, and CKD, 8/11/23 p/w acute hypercapnic respiratory failure; c/b RUSS    (1)CKD - stage 4    (2)RUSS - ATN vs AIN -improved relative to a few weeks ago - creatinine plateauing in mid-2s, corresponding with GFR ~10-15ml/min;     (3)Hyponatremia - low but slowly improving/stable, on NaCL tabs, IV Bumex, and s/p intermittent 1.5%NS; now planned for HD x 3 days     (4)Pulm- hypercapnic respiratory failure on admission - now s/p trach; remains on vent     (5)ID - on IV Meropenem/Vanco. BCx w/ NGTD    (6)Anemia - Hgb improved/stable s/p PRBC yesterday   IMPRESSION: 73F w/ HTN, DM2, CVA, breast CA-bilateral mastectomy, recurrent aspiration pneumonia/respiratory failure, and CKD, 8/11/23 p/w acute hypercapnic respiratory failure; c/b RUSS    (1)CKD - stage 4    (2)RUSS - ATN vs AIN -improved relative to a few weeks ago - creatinine plateauing in mid-2s, corresponding with GFR ~10-15ml/min;     (3)Hyponatremia - low but slowly improving/stable, on NaCL tabs, IV Bumex, and s/p intermittent 1.5%NS; now planned for HD x 3 days     (4)Pulm- hypercapnic respiratory failure on admission - now s/p trach; remains on vent     (5)ID - on IV Meropenem/Vanco. BCx w/ NGTD    (6)Anemia - Hgb improved/stable s/p PRBC yesterday    (7) AMS - is the worsening of the AMS due to uremia, do not believe so, but cannot say definitively that she does not have uremic encephalopathy- this could be well-treated with dialysis. Reasonable to attempt HD daily x next 3 days and monitor for neurologic response. Can minimize risk of osmotic demyelination syndrome by using a relatively low-Na+ bath today and tomorrow; we can opt for a 135meq/L Na+ bath for today and increase to 140meq/L on Thursday.     RECOMMEND:  (1)HD today and again tomorrow  (2)NaCl 2gm bid via NGT as ordered  (3)Bumex 2mg IV bid as ordered  (4)Dose new meds for GFR 10-15    D/w Dr. Isaiah Espinoza, NP  [facility]  [phone]        IMPRESSION: 73F w/ HTN, DM2, CVA, breast CA-bilateral mastectomy, recurrent aspiration pneumonia/respiratory failure, and CKD, 8/11/23 p/w acute hypercapnic respiratory failure; c/b RUSS    (1)CKD - stage 4    (2)RUSS - ATN vs AIN -improved relative to a few weeks ago - creatinine plateauing in mid-2s, corresponding with GFR ~10-15ml/min;     (3)Hyponatremia - low but slowly improving/stable, on NaCL tabs, IV Bumex, and s/p intermittent 1.5%NS    (4)Pulm- hypercapnic respiratory failure on admission - now s/p trach; remains on vent     (5)ID - on IV Meropenem/Vanco. BCx w/ NGTD    (6)Anemia - Hgb improved/stable s/p PRBC yesterday    (7 AMS - is the worsening of the AMS due to uremia, do not believe so, but cannot say definitively that she does not have uremic encephalopathy- this could be well-treated with dialysis. Reasonable to attempt HD daily x 3 days and monitor for neurologic response. Can minimize risk of osmotic demyelination syndrome by using a relatively low-Na+ bath today and tomorrow; we can opt for a 135meq/L Na+ bath for today and increase to 140meq/L on Thursday.     RECOMMEND:  (1)HD today and again tomorrow  (2)NaCl 2gm bid via NGT as ordered  (3)Bumex 2mg IV bid as ordered  (4)Dose new meds for GFR 10-15    D/w Dr. Isaiah Espinoza, NP  [facility]  [phone]        IMPRESSION: 73F w/ HTN, DM2, CVA, breast CA-bilateral mastectomy, recurrent aspiration pneumonia/respiratory failure, and CKD, 8/11/23 p/w acute hypercapnic respiratory failure; c/b RUSS    (1)CKD - stage 4    (2)RUSS - ATN vs AIN -improved relative to a few weeks ago - creatinine plateauing in mid-2s, corresponding with GFR ~10-15ml/min;     (3)Hyponatremia - low but slowly improving/stable, on NaCL tabs, IV Bumex, and s/p intermittent 1.5%NS    (4)Pulm- hypercapnic respiratory failure on admission - now s/p trach; remains on vent     (5)ID - on IV Meropenem/Vanco. BCx w/ NGTD    (6)Anemia - Hgb improved/stable s/p PRBC yesterday    (7 AMS - is the worsening of the AMS due to uremia, do not believe so, but cannot say definitively that she does not have uremic encephalopathy- this could be well-treated with dialysis. Reasonable to attempt HD daily x 3 days and monitor for neurologic response. Can minimize risk of osmotic demyelination syndrome by using a relatively low-Na+ bath today and tomorrow; we can opt for a 135meq/L Na+ bath for today and increase to 140meq/L on Thursday.     RECOMMEND:  (1)HD today and again tomorrow  (2)NaCl 2gm bid via NGT as ordered  (3)Bumex 2mg IV bid as ordered  (4)Dose new meds for GFR 10-15    D/w Dr. Isaiah Espinoza, EMERITA  [facility]  [phone]     RENAL ATTENDING NOTE  Patient seen and examined with NP. Agree with assessment and plan as above.    Jimmy Colon MD  [facility]  [phone]

## 2023-09-21 NOTE — PROGRESS NOTE ADULT - ASSESSMENT
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 While in MICU patient noted with worsening renal failure requiring HD initiation, CGE/ Melena s/p EGD 8/31 found with esophagitis and erosive gastropathy, new right cerebellar infarct and fevers second to ESBL COLI and MSSA VAP, MSSA bacteremia, left ear otitis externa and drug rxn to cephalosporins Course further complicated by prolonged vent time s/p tracheostomy 9/1 and transferred to RCU 9/3 completed by recurrent fevers with high PIP, respiratory acidosis and concern for volume overloaded.     NEUROLOGY  # Metabolic Encephalopath vs NEW cerebella infarct   - Baseline MS AOX3 with short term memory changes per family however noted with concern for poor mentation in ICU  - MRI (8/17) with NEW right cerebellar infarct and old left PCA/occipital infarcts  - STEPHANIE (8/17) with AV showing two linear mobile echogenic elements attached to valve leaflets consistent with Lambel's excrescence, but no TRINITY thrombus, and lambel's excrescence with uncertain clinical implication.   - EEG (8/11-8/15) with no seizures   - ILR interrogation (9/7) with SR and PACs falsely recorded as AF.   - Attempted to resume ASA for medical management but held given GIB   - Continue on Lipitor for medical management   - Course complicated by questionable head and body shaking 9/8 however prolactin elevated and shakes head yes/no to some questions.   - Lethargy noted, most likely related to rising BUN    CARDIOVASCULAR  # HTN Urgency   # Septic vs vasoplegic shock   - Labile BPs ranging in between SBP 80s-200s and attempted labetalol, however noted with hypotension and bradycardia likely from BB toxicity vs shock state?    - Holding Labetalol and Catapres   - c/w Cardura for now   - Hypotensive in the 80s systolic overnight, requiring Midodrine 20mg x 1 (resolved)   - Stable today, still mildly hypotensive, but no intervention at this time     # Hx of HFpEF with likely ADHF with volume overload.   - ECHO (7/2023) with EF 59 with severe LVH and diastolic dysfunction   - STEPHANIE (8/18) with normal LVRVSF however noted with increased LA pressures and persistent LA septal bowing into RA.   - ECHO (8/11) with EF 60 with mild LVH, normal LVRVSF, and mild ddfxn.  - Remove volume with HD sessions and intermittent bumex pushes as BP allows    - c/w Bumex 2mg IV BID for now - Monitor UOP and electrolytes     HEENT   # Left ear OE   - ENT evaluation (9/7) with no mastoid tenderness, however found with positive swelling and excoriation to left auricle and tenderness to manipulation of auricle. Debrided crusting of auricle and EAC evaluated with edema and unable to visualize TM. EAC debrided and found with watery exudate.   - s/p CiproHC (9/5 - 9/16)   - Continue on Bradford (9/1 - ) as below   - ENT following and ear wick change QD   - Wick out but needs ? Debridement wound care called/fu ent   - ENT recommending CT IAC w/ IV con but family is refusing as concerned given CKD, kidneys wouldn't recover from IV contrast. Spoke with Nephrology, ENT, and patient's  at length. Planned for CT non con and per , decision will be made from there but for now doesn't want to risk further harming kidneys.  - CT IAC showing acute on chronic left-sided otitis externa and acute on chronic left-sided otomastoiditis. Superimposed left-sided tympanosclerosis. Superimposed cholesteatoma formation within the left tympanomastoid cavity cannot be excluded. No evidence of malignant otitis externa.  - ENT consulted (9/20) for white purulent discharge from left ear, recc: ENT:  acetic acid drops BID to left ear. mupirocin ointment to the left pinna BID, cover with xeroform after placing mupirocin ointment. pressure offloading of the left ear. wound care consulted for left pinna pressure injury.     # Oral Lesions with questionable Zoster vs Trauma?   - Patient with tongue pain and seen with anterior ulcerations consolidating and crusting and posterior ulceration.  - Case discussed with pathology resident and culture/ cytology sent.   - HSV negative and Path (9/6) with normal appearing epithelial cells singly and in clusters devoid of viral cytopathic effect.   - Continue on magic mouth wash for pain    RESPIRATORY  # AHHRF second to volume overload   - Hx of CKD and HFpEF presented with SOB and hypercarbia second to volume overload requiring intubation and HD initiation   - Vent weaning attempted however limited requiring tracheostomy (9/1) with IP   - Course complicated by PIPs elevated (50s) and respiratory acidosis second to secretions vs volume ?    - Vent adjusted 20/300/5/30 without improvement.   - POCUS (9/8) with BL pleural effusions (large on right and small on left)   - CT CHEST (9/8) with TBM, BL pleural effusions and continued consolidations   - Continue on vent and given TBM increased PEEP (9/9) to 20/300/8/40 with overall improvement   - Continue on Duoneb and HTS for airway clearance   - Continue volume removal with diuresis and aggressive UF sessions.   - Discussing possible thora but appears improved and will monitor.  ]  - Bedside US showing decrease in pleural effusion - hold off thoracentesis 9/10     GI  # UGIB   - CGE x 1 episode on 8/24 and melena x 2 episodes on 8/31 likely residual blood.   - EGD (8/31) found with esophagitis and erosive gastropathy  - Continue on PPI BID through late October and then PPI QD   - No melena     # Dysphagia   - NGT-TF continued   - Pending PEG however needs improvement in infectious status prior to placement.     RENAL  # Progressive CKD   - Presented volume overload and progressive RUSS on CKD (baseline CRE in 2s and mode into the 3s on admission) likely obstruction vs low flow state with shock vs AIN from drug reaction issue?  - POCUS with no signs of hydronephrosis   - Pressor support started with improvement in renal function  - Attempted steroid course in ICU without improvement in renal function   - Case discussed at length with renal and initiated on HD in ICU and now continued on HD TTHS, however remains volume overloaded.   - Patient oliguric however slightly responding to Bumex pushes - does not make enough urine   - Nephro following - Plan to resume HD TIW   - Monitor renal function and UOP, and electrolytes for now  - Monitor urine output - Will perform bladder scan/straight cath as needed if retaining urine     Hyponatremia (improving) Na 123>>125  - c/w Salt tabs - s/p Hypertonic 1.5%NS @ 50cc/hr x 5 hr  - Renal- Will resume HD i/s/o of rising BUN, poor urine output on Bumex IVP     # Hyperphosphatemia (resolved)   - PTH normal and elevated phos likely from renal failure  - s/p Phos binder with Renvela - now d'beth as level wnl      INFECTIOUS DISEASE  # ESBL ECOLI and MSSA VAP c/b MSSA bacteremia   - RVP/ COVID (8/11) negative   - SCx (8/11) with MSSA s/p cefepime (8/12-8/13) and then ancef (8/14) however noted with drug reaction and then changed to vanco and aztreonam (8/12-8/13) in ICU .   - Course complicated by recurrent fevers and return of MSSA with bacteremia.   - SCx (9/1) with MSSA and ESBL ECOLI   - BAL (9/1) with MSSA   - BCx (9/1) with MSSA and cleared on (9/4)   - ECHO (9/6) unable to rule out endocarditis   - Continue on Bradford (9/4 - ) and Vanco BY DOSE POST HD (9/4, 9/7, 9/9 ...) with duration TBD at this time.   - Given inability to rule out endocarditis will discuss possible 4 wks with ID?   - Course complicated by fever (9/7) and UA with leuks but no bacteria, however compared to prior improved, RVP/COVID and SCx negative, and RPT CXR similar to prior   - LE DOPPLERS- Blood and sputum cx NTD; Acute left deep vein thrombosis of the left popliteal vein.  - Febrile overnight 102 recultured and sent off   -Pt receiving vanco post HD however today given vanco 750mg x 1 will check Vanco level at end of HD session and dose   - BCx (9/20): sent, pending result  - ID recc CT C/A/P w/ IV contrast - on hold d/t unsure if pt tolerate HD alissa    # Left ear OE   - ENT evaluation (9/7) with no mastoid tenderness, however found with positive swelling and excoriation to left auricle and tenderness to manipulation of auricle. Debrided crusting of auricle and EAC evaluated with edema and unable to visualize TM. EAC debrided and found with watery exudate.   - s/p CiproHC (9/5 - 9/16)     # MRSA PCRs   - MRSA PCR (8/11 and 8/14) negative   - Next MRSA + PCR ordered for 10/8     HEME  # Anemia second to GIB vs renal disease   - Hold chemical DVT PPX given bleeding/ waxing and waning HH   - s/p multiple units of PRBCs (8/15, 8/21, 8/28, 8/31 and 9/8)   - Anemia panel with AOCD but with low %sat and ferrous sulfate added to optimize   - Monitor HH and discuss with renal for EPO sometime next week.   - Spoke with GI for clearance to start Heparin gtt for + DVT   - hold heparin gtt d/t bleeding around shiley when started on HD (9/20)    Vascular  # DVT  - Pt with + popliteal DVT on SCD Spoke with GI no absolute contraindication for starting Heparin - advise close monitoring for bleeding - Do stat CTA if bleeding occurs and call IR   - Pt specific heparin gtt started with goal PTT 60-85  - will monitor closely   - hold heparin gtt d/t bleeding around shiley when started on HD (9/20)  - cards: consider ?IVC filter    ONC   # Hx of Breast CA   - Patient dx in 2018 and s/p BL mastectomy (radical on right) and chemo and RT.   - Supportive care.     ENDOCRINE  # IDDM2   - Continue on NPH 3U and ISS   - Monitor FS and adjust as needed     LINES/ TUBES  - R IJ SHILEY (8/19 - )     SKIN  # Drug Eruption   - Attempted cefepime (8/12) vs ancef (8/13-8/14) and then noted with drug rxn.   - Hold further cephalosporins at this time   - s/p Steroids with prednisone 40 (8/18 - 9/2) then prednisone 30 (9/3-9/7), then prednisone 20 (9/8 - 9/10), then prednisone 10mg (9/11-9/13) then turn off.     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 While in MICU patient noted with worsening renal failure requiring HD initiation, CGE/ Melena s/p EGD 8/31 found with esophagitis and erosive gastropathy, new right cerebellar infarct and fevers second to ESBL COLI and MSSA VAP, MSSA bacteremia, left ear otitis externa and drug rxn to cephalosporins Course further complicated by prolonged vent time s/p tracheostomy 9/1 and transferred to RCU 9/3 completed by recurrent fevers with high PIP, respiratory acidosis and concern for volume overloaded.     NEUROLOGY  # Metabolic Encephalopath vs NEW cerebella infarct   - Baseline MS AOX3 with short term memory changes per family however noted with concern for poor mentation in ICU  - MRI (8/17) with NEW right cerebellar infarct and old left PCA/occipital infarcts  - STEPHANIE (8/17) with AV showing two linear mobile echogenic elements attached to valve leaflets consistent with Lambel's excrescence, but no TRINITY thrombus, and lambel's excrescence with uncertain clinical implication.   - EEG (8/11-8/15) with no seizures   - ILR interrogation (9/7) with SR and PACs falsely recorded as AF.   - Attempted to resume ASA for medical management but held given GIB   - Continue on Lipitor for medical management   - Course complicated by questionable head and body shaking 9/8 however prolactin elevated and shakes head yes/no to some questions.   - Lethargy noted, most likely related to rising BUN    CARDIOVASCULAR  # HTN Urgency   # Septic vs vasoplegic shock   - Labile BPs ranging in between SBP 80s-200s and attempted labetalol, however noted with hypotension and bradycardia likely from BB toxicity vs shock state?    - Holding Labetalol and Catapres   - c/w Cardura for now   - Hypotensive in the 80s systolic overnight, requiring Midodrine 20mg x 1 (resolved)   - Stable today, still mildly hypotensive, but no intervention at this time     # Hx of HFpEF with likely ADHF with volume overload.   - ECHO (7/2023) with EF 59 with severe LVH and diastolic dysfunction   - STEPHANIE (8/18) with normal LVRVSF however noted with increased LA pressures and persistent LA septal bowing into RA.   - ECHO (8/11) with EF 60 with mild LVH, normal LVRVSF, and mild ddfxn.  - Remove volume with HD sessions and intermittent bumex pushes as BP allows    - c/w Bumex 2mg IV BID for now - Monitor UOP and electrolytes     HEENT   # Left ear OE   - ENT evaluation (9/7) with no mastoid tenderness, however found with positive swelling and excoriation to left auricle and tenderness to manipulation of auricle. Debrided crusting of auricle and EAC evaluated with edema and unable to visualize TM. EAC debrided and found with watery exudate.   - s/p CiproHC (9/5 - 9/16)   - Continue on Bradford (9/1 - ) as below   - ENT following and ear wick change QD   - Wick out but needs ? Debridement wound care called/fu ent   - ENT recommending CT IAC w/ IV con but family is refusing as concerned given CKD, kidneys wouldn't recover from IV contrast. Spoke with Nephrology, ENT, and patient's  at length. Planned for CT non con and per , decision will be made from there but for now doesn't want to risk further harming kidneys.  - CT IAC showing acute on chronic left-sided otitis externa and acute on chronic left-sided otomastoiditis. Superimposed left-sided tympanosclerosis. Superimposed cholesteatoma formation within the left tympanomastoid cavity cannot be excluded. No evidence of malignant otitis externa.  - ENT consulted (9/20) for white purulent discharge from left ear, recc: ENT:  acetic acid drops BID to left ear. mupirocin ointment to the left pinna BID, cover with xeroform after placing mupirocin ointment. pressure offloading of the left ear. wound care consulted for left pinna pressure injury.     # Oral Lesions with questionable Zoster vs Trauma?   - Patient with tongue pain and seen with anterior ulcerations consolidating and crusting and posterior ulceration.  - Case discussed with pathology resident and culture/ cytology sent.   - HSV negative and Path (9/6) with normal appearing epithelial cells singly and in clusters devoid of viral cytopathic effect.   - Continue on magic mouth wash for pain    RESPIRATORY  # AHHRF second to volume overload   - Hx of CKD and HFpEF presented with SOB and hypercarbia second to volume overload requiring intubation and HD initiation   - Vent weaning attempted however limited requiring tracheostomy (9/1) with IP   - Course complicated by PIPs elevated (50s) and respiratory acidosis second to secretions vs volume ?    - Vent adjusted 20/300/5/30 without improvement.   - POCUS (9/8) with BL pleural effusions (large on right and small on left)   - CT CHEST (9/8) with TBM, BL pleural effusions and continued consolidations   - Continue on vent and given TBM increased PEEP (9/9) to 20/300/8/40 with overall improvement   - Continue on Duoneb and HTS for airway clearance   - Continue volume removal with diuresis and aggressive UF sessions.   - Discussing possible thora but appears improved and will monitor.  ]  - Bedside US showing decrease in pleural effusion - hold off thoracentesis 9/10     GI  # UGIB   - CGE x 1 episode on 8/24 and melena x 2 episodes on 8/31 likely residual blood.   - EGD (8/31) found with esophagitis and erosive gastropathy  - Continue on PPI BID through late October and then PPI QD   - No melena     # Dysphagia   - NGT-TF continued   - Pending PEG however needs improvement in infectious status prior to placement.     RENAL  # Progressive CKD   - Presented volume overload and progressive RUSS on CKD (baseline CRE in 2s and mode into the 3s on admission) likely obstruction vs low flow state with shock vs AIN from drug reaction issue?  - POCUS with no signs of hydronephrosis   - Pressor support started with improvement in renal function  - Attempted steroid course in ICU without improvement in renal function   - Case discussed at length with renal and initiated on HD in ICU and now continued on HD TTHS, however remains volume overloaded.   - Patient oliguric however slightly responding to Bumex pushes - does not make enough urine   - Nephro following - Plan to resume HD TIW - Nephro recc holding HD alissa (9/21)   - Monitor renal function and UOP, and electrolytes for now  - Monitor urine output - Will perform bladder scan/straight cath as needed if retaining urine     Hyponatremia (improving) Na 123>>125  - c/w Salt tabs - s/p Hypertonic 1.5%NS @ 50cc/hr x 5 hr  - Renal- Will resume HD i/s/o of rising BUN, poor urine output on Bumex IVP     # Hyperphosphatemia (resolved)   - PTH normal and elevated phos likely from renal failure  - s/p Phos binder with Renvela - now d'beth as level wnl      INFECTIOUS DISEASE  # ESBL ECOLI and MSSA VAP c/b MSSA bacteremia   - RVP/ COVID (8/11) negative   - SCx (8/11) with MSSA s/p cefepime (8/12-8/13) and then ancef (8/14) however noted with drug reaction and then changed to vanco and aztreonam (8/12-8/13) in ICU .   - Course complicated by recurrent fevers and return of MSSA with bacteremia.   - SCx (9/1) with MSSA and ESBL ECOLI   - BAL (9/1) with MSSA   - BCx (9/1) with MSSA and cleared on (9/4)   - ECHO (9/6) unable to rule out endocarditis   - Continue on Bradford (9/4 - ) and Vanco BY DOSE POST HD (9/4, 9/7, 9/9 ...) with duration TBD at this time.   - Given inability to rule out endocarditis will discuss possible 4 wks with ID?   - Course complicated by fever (9/7) and UA with leuks but no bacteria, however compared to prior improved, RVP/COVID and SCx negative, and RPT CXR similar to prior   - LE DOPPLERS- Blood and sputum cx NTD; Acute left deep vein thrombosis of the left popliteal vein.  - Febrile overnight 102 recultured and sent off   -Pt receiving vanco post HD however today given vanco 750mg x 1 will check Vanco level at end of HD session and dose   - BCx (9/20): sent, pending result  - ID recc CT C/A/P w/ IV contrast - on hold d/t unsure if pt tolerate HD alissa    # Left ear OE   - ENT evaluation (9/7) with no mastoid tenderness, however found with positive swelling and excoriation to left auricle and tenderness to manipulation of auricle. Debrided crusting of auricle and EAC evaluated with edema and unable to visualize TM. EAC debrided and found with watery exudate.   - s/p CiproHC (9/5 - 9/16)     # MRSA PCRs   - MRSA PCR (8/11 and 8/14) negative   - Next MRSA + PCR ordered for 10/8     HEME  # Anemia second to GIB vs renal disease   - Hold chemical DVT PPX given bleeding/ waxing and waning HH   - s/p multiple units of PRBCs (8/15, 8/21, 8/28, 8/31 and 9/8)   - Anemia panel with AOCD but with low %sat and ferrous sulfate added to optimize   - Monitor HH and discuss with renal for EPO sometime next week.   - Spoke with GI for clearance to start Heparin gtt for + DVT   - restarted heparin gtt (9/21)    Vascular  # DVT  - Pt with + popliteal DVT on SCD Spoke with GI no absolute contraindication for starting Heparin - advise close monitoring for bleeding - Do stat CTA if bleeding occurs and call IR   - Pt specific heparin gtt started with goal PTT 60-85  - will monitor closely   - restarted heparin gtt (9/21)    ONC   # Hx of Breast CA   - Patient dx in 2018 and s/p BL mastectomy (radical on right) and chemo and RT.   - Supportive care.     ENDOCRINE  # IDDM2   - Continue on NPH 3U and ISS   - Monitor FS and adjust as needed     LINES/ TUBES  - R ARTHUR TAYLOR (8/19 - 9/21)     SKIN  # Drug Eruption   - Attempted cefepime (8/12) vs ancef (8/13-8/14) and then noted with drug rxn.   - Hold further cephalosporins at this time   - s/p Steroids with prednisone 40 (8/18 - 9/2) then prednisone 30 (9/3-9/7), then prednisone 20 (9/8 - 9/10), then prednisone 10mg (9/11-9/13) then turn off.     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 While in MICU patient noted with worsening renal failure requiring HD initiation, CGE/ Melena s/p EGD 8/31 found with esophagitis and erosive gastropathy, new right cerebellar infarct and fevers second to ESBL COLI and MSSA VAP, MSSA bacteremia, left ear otitis externa and drug rxn to cephalosporins Course further complicated by prolonged vent time s/p tracheostomy 9/1 and transferred to RCU 9/3 completed by recurrent fevers with high PIP, respiratory acidosis and concern for volume overloaded.     NEUROLOGY  # Metabolic Encephalopath vs NEW cerebella infarct   - Baseline MS AOX3 with short term memory changes per family however noted with concern for poor mentation in ICU  - MRI (8/17) with NEW right cerebellar infarct and old left PCA/occipital infarcts  - STEPHANIE (8/17) with AV showing two linear mobile echogenic elements attached to valve leaflets consistent with Lambel's excrescence, but no TRINITY thrombus, and lambel's excrescence with uncertain clinical implication.   - EEG (8/11-8/15) with no seizures   - ILR interrogation (9/7) with SR and PACs falsely recorded as AF.   - Attempted to resume ASA for medical management but held given GIB   - Continue on Lipitor for medical management   - Course complicated by questionable head and body shaking 9/8 however prolactin elevated and shakes head yes/no to some questions.   - Lethargy noted, most likely related to rising BUN    CARDIOVASCULAR  # HTN Urgency   # Septic vs vasoplegic shock   - Labile BPs ranging in between SBP 80s-200s and attempted labetalol, however noted with hypotension and bradycardia likely from BB toxicity vs shock state?    - Holding Labetalol and Catapres   - c/w Cardura for now   - Hypotensive in the 80s systolic overnight, requiring Midodrine 20mg x 1 (resolved)   - Stable today, still mildly hypotensive, but no intervention at this time     # Hx of HFpEF with likely ADHF with volume overload.   - ECHO (7/2023) with EF 59 with severe LVH and diastolic dysfunction   - STEPHANIE (8/18) with normal LVRVSF however noted with increased LA pressures and persistent LA septal bowing into RA.   - ECHO (8/11) with EF 60 with mild LVH, normal LVRVSF, and mild ddfxn.  - Remove volume with HD sessions and intermittent bumex pushes as BP allows    - c/w Bumex 2mg IV BID for now - Monitor UOP and electrolytes     HEENT   # Left ear OE   - ENT evaluation (9/7) with no mastoid tenderness, however found with positive swelling and excoriation to left auricle and tenderness to manipulation of auricle. Debrided crusting of auricle and EAC evaluated with edema and unable to visualize TM. EAC debrided and found with watery exudate.   - s/p CiproHC (9/5 - 9/16)   - Continue on Bradford (9/1 - ) as below   - ENT following and ear wick change QD   - Wick out but needs ? Debridement wound care called/fu ent   - ENT recommending CT IAC w/ IV con but family is refusing as concerned given CKD, kidneys wouldn't recover from IV contrast. Spoke with Nephrology, ENT, and patient's  at length. Planned for CT non con and per , decision will be made from there but for now doesn't want to risk further harming kidneys.  - CT IAC showing acute on chronic left-sided otitis externa and acute on chronic left-sided otomastoiditis. Superimposed left-sided tympanosclerosis. Superimposed cholesteatoma formation within the left tympanomastoid cavity cannot be excluded. No evidence of malignant otitis externa.  - ENT consulted (9/20) for white purulent discharge from left ear, recc: ENT:  acetic acid drops BID to left ear. mupirocin ointment to the left pinna BID, cover with xeroform after placing mupirocin ointment. pressure offloading of the left ear. wound care consulted for left pinna pressure injury.     # Oral Lesions with questionable Zoster vs Trauma?   - Patient with tongue pain and seen with anterior ulcerations consolidating and crusting and posterior ulceration.  - Case discussed with pathology resident and culture/ cytology sent.   - HSV negative and Path (9/6) with normal appearing epithelial cells singly and in clusters devoid of viral cytopathic effect.   - Continue on magic mouth wash for pain    RESPIRATORY  # AHHRF second to volume overload   - Hx of CKD and HFpEF presented with SOB and hypercarbia second to volume overload requiring intubation and HD initiation   - Vent weaning attempted however limited requiring tracheostomy (9/1) with IP   - Course complicated by PIPs elevated (50s) and respiratory acidosis second to secretions vs volume ?    - Vent adjusted 20/300/5/30 without improvement.   - POCUS (9/8) with BL pleural effusions (large on right and small on left)   - CT CHEST (9/8) with TBM, BL pleural effusions and continued consolidations   - Continue on vent and given TBM increased PEEP (9/9) to 20/300/8/40 with overall improvement   - Continue on Duoneb and HTS for airway clearance   - Continue volume removal with diuresis and aggressive UF sessions.   - Discussing possible thora but appears improved and will monitor.  ]  - Bedside US showing decrease in pleural effusion - hold off thoracentesis 9/10     GI  # UGIB   - CGE x 1 episode on 8/24 and melena x 2 episodes on 8/31 likely residual blood.   - EGD (8/31) found with esophagitis and erosive gastropathy  - Continue on PPI BID through late October and then PPI QD   - No melena     # Dysphagia   - NGT-TF continued   - Pending PEG however needs improvement in infectious status prior to placement.     RENAL  # Progressive CKD   - Presented volume overload and progressive RUSS on CKD (baseline CRE in 2s and mode into the 3s on admission) likely obstruction vs low flow state with shock vs AIN from drug reaction issue?  - POCUS with no signs of hydronephrosis   - Pressor support started with improvement in renal function  - Attempted steroid course in ICU without improvement in renal function   - Case discussed at length with renal and initiated on HD in ICU and now continued on HD TTHS, however remains volume overloaded.   - Patient oliguric however slightly responding to Bumex pushes - does not make enough urine   - Nephro following - Plan to resume HD TIW - Nephro recc holding HD alissa (9/21)   - Monitor renal function and UOP, and electrolytes for now  - Monitor urine output - Will perform bladder scan/straight cath as needed if retaining urine     Hyponatremia (improving) Na 123>>125  - c/w Salt tabs - s/p Hypertonic 1.5%NS @ 50cc/hr x 5 hr  - Renal- Will resume HD i/s/o of rising BUN, poor urine output on Bumex IVP     # Hyperphosphatemia (resolved)   - PTH normal and elevated phos likely from renal failure  - s/p Phos binder with Renvela - now d'beth as level wnl      INFECTIOUS DISEASE  # ESBL ECOLI and MSSA VAP c/b MSSA bacteremia   - RVP/ COVID (8/11) negative   - SCx (8/11) with MSSA s/p cefepime (8/12-8/13) and then ancef (8/14) however noted with drug reaction and then changed to vanco and aztreonam (8/12-8/13) in ICU .   - Course complicated by recurrent fevers and return of MSSA with bacteremia.   - SCx (9/1) with MSSA and ESBL ECOLI   - BAL (9/1) with MSSA   - BCx (9/1) with MSSA and cleared on (9/4)   - ECHO (9/6) unable to rule out endocarditis   - Continue on Bradford (9/4 - ) and Vanco BY DOSE POST HD (9/4, 9/7, 9/9 ...) with duration TBD at this time.   - Given inability to rule out endocarditis will discuss possible 4 wks with ID?   - Course complicated by fever (9/7) and UA with leuks but no bacteria, however compared to prior improved, RVP/COVID and SCx negative, and RPT CXR similar to prior   - LE DOPPLERS- Blood and sputum cx NTD; Acute left deep vein thrombosis of the left popliteal vein.  - Febrile overnight 102 recultured and sent off   -Pt receiving vanco post HD however today given vanco 750mg x 1 will check Vanco level at end of HD session and dose   - BCx (9/20): sent, pending result  - ID recc CT C/A/P w/ IV contrast - on hold d/t unsure if pt tolerate HD alissa  - add fluconazole 200 qd (9/21)    # Left ear OE   - ENT evaluation (9/7) with no mastoid tenderness, however found with positive swelling and excoriation to left auricle and tenderness to manipulation of auricle. Debrided crusting of auricle and EAC evaluated with edema and unable to visualize TM. EAC debrided and found with watery exudate.   - s/p CiproHC (9/5 - 9/16)     # MRSA PCRs   - MRSA PCR (8/11 and 8/14) negative   - Next MRSA + PCR ordered for 10/8     HEME  # Anemia second to GIB vs renal disease   - Hold chemical DVT PPX given bleeding/ waxing and waning HH   - s/p multiple units of PRBCs (8/15, 8/21, 8/28, 8/31 and 9/8)   - Anemia panel with AOCD but with low %sat and ferrous sulfate added to optimize   - Monitor HH and discuss with renal for EPO sometime next week.   - Spoke with GI for clearance to start Heparin gtt for + DVT   - restarted heparin gtt (9/21)    Vascular  # DVT  - Pt with + popliteal DVT on SCD Spoke with GI no absolute contraindication for starting Heparin - advise close monitoring for bleeding - Do stat CTA if bleeding occurs and call IR   - Pt specific heparin gtt started with goal PTT 60-85  - will monitor closely   - restarted heparin gtt (9/21)    ONC   # Hx of Breast CA   - Patient dx in 2018 and s/p BL mastectomy (radical on right) and chemo and RT.   - Supportive care.     ENDOCRINE  # IDDM2   - Continue on NPH 3U and ISS   - Monitor FS and adjust as needed     LINES/ TUBES  - R ARTHUR TAYLOR (8/19 - 9/21)     SKIN  # Drug Eruption   - Attempted cefepime (8/12) vs ancef (8/13-8/14) and then noted with drug rxn.   - Hold further cephalosporins at this time   - s/p Steroids with prednisone 40 (8/18 - 9/2) then prednisone 30 (9/3-9/7), then prednisone 20 (9/8 - 9/10), then prednisone 10mg (9/11-9/13) then turn off.     ETHICS/ GOC    - FULL CODE     DISPO - TBD

## 2023-09-21 NOTE — PROGRESS NOTE ADULT - SUBJECTIVE AND OBJECTIVE BOX
Patient is a 73y old  Female who presents with a chief complaint of Respiratory distress (21 Sep 2023 16:43)      SUBJECTIVE / OVERNIGHT EVENTS: RIJ shiley was removed, if need HD will need another shiley placed as per renal. On IV Fluconazole for fungal cx+ from O. externa DC    MEDICATIONS  (STANDING):  acetic acid 0.25% Topical Irrigation 1 Application(s) Topical two times a day  albuterol/ipratropium for Nebulization 3 milliLiter(s) Nebulizer every 6 hours  atorvastatin 40 milliGRAM(s) Oral at bedtime  buMETAnide Injectable 2 milliGRAM(s) IV Push two times a day  chlorhexidine 0.12% Liquid 15 milliLiter(s) Oral Mucosa every 12 hours  chlorhexidine 2% Cloths 1 Application(s) Topical daily  dextrose 5%. 1000 milliLiter(s) (50 mL/Hr) IV Continuous <Continuous>  dextrose 5%. 1000 milliLiter(s) (100 mL/Hr) IV Continuous <Continuous>  dextrose 50% Injectable 12.5 Gram(s) IV Push once  dextrose 50% Injectable 25 Gram(s) IV Push once  dextrose 50% Injectable 25 Gram(s) IV Push once  doxazosin 6 milliGRAM(s) Oral <User Schedule>  epoetin odalis (EPOGEN) Injectable 36099 Unit(s) SubCutaneous <User Schedule>  ferrous    sulfate Liquid 300 milliGRAM(s) Enteral Tube daily  glucagon  Injectable 1 milliGRAM(s) IntraMuscular once  heparin  Infusion 1050 Unit(s)/Hr (10.5 mL/Hr) IV Continuous <Continuous>  insulin lispro (ADMELOG) corrective regimen sliding scale   SubCutaneous every 6 hours  insulin NPH human recombinant 3 Unit(s) SubCutaneous every 6 hours  meropenem  IVPB 500 milliGRAM(s) IV Intermittent every 12 hours  mupirocin 2% Ointment 1 Application(s) Topical two times a day  pantoprazole  Injectable 40 milliGRAM(s) IV Push two times a day  psyllium Powder 1 Packet(s) Oral daily  sodium chloride 2 Gram(s) Oral two times a day    MEDICATIONS  (PRN):  acetaminophen   Oral Liquid .. 650 milliGRAM(s) Oral every 6 hours PRN Temp greater or equal to 38C (100.4F), Mild Pain (1 - 3)  dextrose Oral Gel 15 Gram(s) Oral once PRN Blood Glucose LESS THAN 70 milliGRAM(s)/deciliter      Vital Signs Last 24 Hrs  T(F): 99.9 (09-21-23 @ 11:45), Max: 100.1 (09-20-23 @ 18:05)  HR: 105 (09-21-23 @ 16:51) (99 - 114)  BP: 113/47 (09-21-23 @ 03:12) (107/51 - 121/45)  RR: 17 (09-21-23 @ 03:12) (17 - 17)  SpO2: 95% (09-21-23 @ 16:51) (95% - 100%)  Telemetry:   CAPILLARY BLOOD GLUCOSE      POCT Blood Glucose.: 208 mg/dL (21 Sep 2023 11:53)  POCT Blood Glucose.: 226 mg/dL (21 Sep 2023 05:39)  POCT Blood Glucose.: 211 mg/dL (20 Sep 2023 23:35)  POCT Blood Glucose.: 203 mg/dL (20 Sep 2023 17:02)    I&O's Summary    20 Sep 2023 07:01  -  21 Sep 2023 07:00  --------------------------------------------------------  IN: 1290 mL / OUT: 1140 mL / NET: 150 mL        PHYSICAL EXAM:  GENERAL: NAD, well-developed  HEAD:  Atraumatic, Normocephalic  EYES: EOMI, PERRLA, conjunctiva and sclera clear  NECK: Supple, No JVD  CHEST/LUNG: Clear to auscultation bilaterally; No wheeze  HEART: Regular rate and rhythm; No murmurs, rubs, or gallops  ABDOMEN: Soft, Nontender, Nondistended; Bowel sounds present  EXTREMITIES:  2+ Peripheral Pulses, No clubbing, cyanosis, or edema  PSYCH: AAOx3  NEUROLOGY: non-focal  SKIN: No rashes or lesions    LABS:                        8.0    5.10  )-----------( 186      ( 21 Sep 2023 05:50 )             24.6     09-21    126<L>  |  91<L>  |  92<H>  ----------------------------<  216<H>  4.3   |  25  |  2.44<H>    Ca    8.6      21 Sep 2023 05:50  Phos  3.6     09-21  Mg     2.40     09-21    TPro  5.4<L>  /  Alb  1.7<L>  /  TBili  0.2  /  DBili  x   /  AST  37<H>  /  ALT  10  /  AlkPhos  333<H>  09-21    PTT - ( 21 Sep 2023 05:50 )  PTT:30.5 sec      Urinalysis Basic - ( 21 Sep 2023 05:50 )    Color: x / Appearance: x / SG: x / pH: x  Gluc: 216 mg/dL / Ketone: x  / Bili: x / Urobili: x   Blood: x / Protein: x / Nitrite: x   Leuk Esterase: x / RBC: x / WBC x   Sq Epi: x / Non Sq Epi: x / Bacteria: x        RADIOLOGY & ADDITIONAL TESTS:    Imaging Personally Reviewed:    Consultant(s) Notes Reviewed:      Care Discussed with Consultants/Other Providers:

## 2023-09-21 NOTE — CHART NOTE - NSCHARTNOTEFT_GEN_A_CORE
Notified by RN that patient has rash. Patient and family seen at bedside by provider. Patient in NAD, mentating at baseline. Per daughter, rash started 2 days ago on abdomen and has since spread. On exam, red, warm, macular rash noted to abdomen, chest, back, BL shoulders, extending to mid BL thighs. No respiratory distress noted. Patient w/ history of multiple allergies and previous drug rash treated w/ steroids. No new medications given yet today, Diflucan to be started this evening. Will trial benadryl and calamine lotion for now. ID following, recs appreciated. Consider derm eval.

## 2023-09-21 NOTE — PROGRESS NOTE ADULT - ASSESSMENT
74 y/o F well known to me from my housecal outptn practice. she was admitted at [facility] 7/12-7/22 w aspiration PNA, was treated w CEFEPIME, developed an allergic rash,  dCHF, + MAC on AFB culture, had been progressively getting more and more lethargic and dyspneic at home since DC.   In  am of 8/11/23  ptn presented with respiratory distress w hypoxia and hypercarbia requiring intubation 2/2 volume overload +/- Asp PNA      Neuro   responds appropriately   Baseline MS AOx3, aphasic   - h/o CVA , on aspirin and statin . resumed w feeding tube, ASA resumed 8/26  - eeg  2/2 tremors, no sz focus  - less responsive in the past few days  - MRI 8/17:  new R cerebellar infarct, old left PCA/Occipital infarct. 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 Mcnulty, now in [facility]  s/p septic shock overnight 9/1, now on meropenem, HDS  s/p trache, on vent support, copious secretions      Renal   Hyperkalemia with EKG changes  , initially treated w LOKELMA,  now resolved   Ptn well know to Dr. Colon, consult reviewed    HD 8/19,  8/21, 8/26 and 8/28.  s/p PUF 8/29 2.5 Liters, HD 8/30,  9/1, 9/4  started chronic HD 9/7,   cardura resumed  on diuretics  hyponatremic  ptn is less responsive, its possible she is uremic, unable to complete HD 9/19 2/2 bleeding at [facility],   [facility] shiley was removed 9/20, if need HD will need another shiley placed as per renal. On IV Fluconazole for fungal cx+ from O. externa DC        GI  NPO  on tube feeds  coffee ground emesis x 1 8/24, melena overnight 8/31, s/p 1UPRBC, EGD/Colonoscopy: erosive gastropathy, esophagisits, on colonoscopy old blood seen no active bleeding, poor prep  family to decide re PEG placement    Endocrine  T2DM   A1C 7.1 in 7/2023   - BG goal 140-200     ID   No fever/leukocytosis, recheck temp   Hx latent TB which was treated, no concern for TB   - grew MAC on AFB culture from most recent admission. at [facility]  -MSSA Bacteremia 9/1, allergic to cephalosprins and PCN, on Vanco as per ID, renal dosing,   - sputum also grew E.Coli-ESBL, as per ID recs for  Bradford through 9/7( 1 week course as per ID) , afebrile.  - 9/8:  spike  temp 38.1C, has O. externa, has a wick, recs are to get a CT to R/O an abscess/bony involvement. cont Meropenem  9/9: ptn w fever overnight to 100.8,  may need shiley pulled if bacteremic, needs NECK CT as per ENT to R/O Mastoid bone involvement while having ongoing purulent DC from L ear 2/2 O. externa, getting daily wick changes  9/10:  spiked 102 overnight through Bradford and Vanco, purulent DC from O. externa, ENT recommend Ct of mastoid. ptn in HD, has Shiley in place, consider pulling shiley and send for Cx. would d/w Renal, and consider contacting  ID, last seen by Dr. Conner 9/6 9/11: remains febrile, Dr. Conner reconsulted. cont Bradford, Vanco  9/12: tmax 100.5, fever curve is improving, awaiting Left ear CT, on Bradford since 9/1. on  vanco for MSSA Bacteremia, does not tolerate cephalosporins. through 10/2  9/13: on full vent support via trache, appears uncomfortable and onur 2/2 Left O. externa. has an incidental left middle ear tumor, no acute middle ear interventions recommended, left ear wick replaced. on Meropenem, cx from Ear DC is neg. On Vanco for MSSA bacteremia through 10/2, course of Meropenem as per ID, afebrile in the past 24 hrs . Cardura for HTN resumed, HGB dropped to 6.4, got a dose of EPO and 1UPRBC, rpt 7.8, remains on HEPARIN drip for LEFT popliteal DVT  9/14: cont on Mupirocin locally to Left ear, cont IV Bradford, ENT signed off, afebrile x 48 hrs, Mro course to be determined by ID, on Vanco for MSSA bacteremia through 10/2. ongoing worsening hyponatremia, Hypertonic saline as per renal, no HD today, s/p 1UPRBC 9/13  9/15: spiking temps again, if cont to spike as per ID re PAN scan. cont Vanco for MSSA Bacteremia  9/16-18: no temp overnight  afebrile, cont to have Left ear DC,   on Vanco and ,bradford through 10/2  On IV Fluconazole for fungal cx+ from O. externa DC      Heme/Onc  ppx: place on SCD  Transfuse for hgb 6.4, no obv bleed. HDS  LEFT POPLITEAL VEIN ACUTE DVT on 9/10    Ethics  GOC - Discussed GOC with daughter and , they have opted in the past for full code. and she remains full code at present

## 2023-09-21 NOTE — CHART NOTE - NSCHARTNOTEFT_GEN_A_CORE
SHAKIRA odom removed as requested by Dr. Colon (nephro). Tip of shiley catheter was visualized. Two sutures removed, intact. Pressure applied for 20 minutes. Hemostasis achieved. Tegaderm and guaze placed - clean, dry, intact. Attending Dr. Lee made aware.

## 2023-09-21 NOTE — PROGRESS NOTE ADULT - SUBJECTIVE AND OBJECTIVE BOX
Follow Up:  MSSA bacteremia    Interval History: ilene was removed today, was febrile yesterday not today    ROS:    Unobtainable because: trached        Allergies  isoniazid (Rash)  nafcillin (Unknown)  hydrALAZINE (Rash)  vitamin E (Short breath; Urticaria; Hives)  doxycycline (Rash)  cefepime (Rash)  NIFEdipine (Urticaria; Hives)        ANTIMICROBIALS:  meropenem  IVPB 500 every 12 hours      OTHER MEDS:  acetaminophen   Oral Liquid .. 650 milliGRAM(s) Oral every 6 hours PRN  acetic acid 0.25% Topical Irrigation 1 Application(s) Topical two times a day  albuterol/ipratropium for Nebulization 3 milliLiter(s) Nebulizer every 6 hours  atorvastatin 40 milliGRAM(s) Oral at bedtime  buMETAnide Injectable 2 milliGRAM(s) IV Push two times a day  chlorhexidine 0.12% Liquid 15 milliLiter(s) Oral Mucosa every 12 hours  chlorhexidine 2% Cloths 1 Application(s) Topical daily  dextrose 5%. 1000 milliLiter(s) IV Continuous <Continuous>  dextrose 5%. 1000 milliLiter(s) IV Continuous <Continuous>  dextrose 50% Injectable 12.5 Gram(s) IV Push once  dextrose 50% Injectable 25 Gram(s) IV Push once  dextrose 50% Injectable 25 Gram(s) IV Push once  dextrose Oral Gel 15 Gram(s) Oral once PRN  doxazosin 6 milliGRAM(s) Oral <User Schedule>  epoetin odalis (EPOGEN) Injectable 96112 Unit(s) SubCutaneous <User Schedule>  ferrous    sulfate Liquid 300 milliGRAM(s) Enteral Tube daily  glucagon  Injectable 1 milliGRAM(s) IntraMuscular once  heparin  Infusion 1050 Unit(s)/Hr IV Continuous <Continuous>  insulin lispro (ADMELOG) corrective regimen sliding scale   SubCutaneous every 6 hours  insulin NPH human recombinant 3 Unit(s) SubCutaneous every 6 hours  mupirocin 2% Ointment 1 Application(s) Topical two times a day  pantoprazole  Injectable 40 milliGRAM(s) IV Push two times a day  psyllium Powder 1 Packet(s) Oral daily  sodium chloride 2 Gram(s) Oral two times a day      Vital Signs Last 24 Hrs  T(C): 37.7 (21 Sep 2023 11:45), Max: 37.8 (20 Sep 2023 18:05)  T(F): 99.9 (21 Sep 2023 11:45), Max: 100.1 (20 Sep 2023 18:05)  HR: 99 (21 Sep 2023 11:33) (99 - 114)  BP: 113/47 (21 Sep 2023 03:12) (107/51 - 121/45)  BP(mean): --  RR: 17 (21 Sep 2023 03:12) (17 - 17)  SpO2: 97% (21 Sep 2023 11:33) (95% - 100%)    Parameters below as of 21 Sep 2023 08:57  Patient On (Oxygen Delivery Method): ventilator        Physical Exam:  General:    trached   ENT: L ear with whitish crusted drainage  Respiratory:   trach on vent  abd:   soft, not tender  :     no CVAT, no suprapubic tenderness, no willard  Musculoskeletal : no joint swelling  Skin:    no rash now  vascular: SHAKIRA odom removed                          8.0    5.10  )-----------( 186      ( 21 Sep 2023 05:50 )             24.6       09-21    126<L>  |  91<L>  |  92<H>  ----------------------------<  216<H>  4.3   |  25  |  2.44<H>    Ca    8.6      21 Sep 2023 05:50  Phos  3.6     09-21  Mg     2.40     09-21    TPro  5.4<L>  /  Alb  1.7<L>  /  TBili  0.2  /  DBili  x   /  AST  37<H>  /  ALT  10  /  AlkPhos  333<H>  09-21      Urinalysis Basic - ( 21 Sep 2023 05:50 )    Color: x / Appearance: x / SG: x / pH: x  Gluc: 216 mg/dL / Ketone: x  / Bili: x / Urobili: x   Blood: x / Protein: x / Nitrite: x   Leuk Esterase: x / RBC: x / WBC x   Sq Epi: x / Non Sq Epi: x / Bacteria: x        MICROBIOLOGY:  v  .Blood Blood-Peripheral  09-20-23   No growth at 24 hours  --  --      Ear Ear  09-13-23   Moderate Candida albicans "Susceptibilities not performed"  --  --      .Blood Blood-Venous  09-10-23   No growth at 5 days  --  --      .Sputum Sputum  09-10-23   Normal Respiratory Cate present  --    Rare polymorphonuclear leukocytes per low power field  No Squamous epithelial cells per low power field  Rare Yeast like cells seen per oil power field  Rare Gram Positive Cocci in Clusters seen per oil power field      .Blood Blood-Peripheral  09-10-23   No growth at 5 days  --  --      .Sputum Sputum  09-08-23   Normal Respiratory Cate present  --    Numerous polymorphonuclear leukocytes per low power field  Numerous Squamous epithelial cells per low power field  Few Yeast like cells per oil power field  Results consistent with oropharyngeal contamination      .Blood Blood-Peripheral  09-08-23   No growth at 5 days  --  --      Ear Ear  09-06-23   No growth at 5 days  --  --      .Blood Blood-Peripheral  09-04-23   No growth at 5 days  --  --      .Bronchial Bronchial Lavage  09-01-23   Numerous Staphylococcus aureus Multiple Morphological Strains  Normal Respiratory Cate present  --  Staphylococcus aureus  Staphylococcus aureus      .Blood Blood-Peripheral  09-01-23   Growth in aerobic and anaerobic bottles: Staphylococcus aureus  Direct identification is available within approximately 3-5  hours either by Blood Panel Multiplexed PCR or Direct  MALDI-TOF. Details: [email or web address]  Growth in anaerobic bottle: blood culture bottle turned positive after  the final report was released.  --  Blood Culture PCR  Staphylococcus aureus      ET Tube ET Tube  09-01-23   Moderate Staphylococcus aureus  Moderate Escherichia coli ESBL  Normal Respiratory Cate present  --  Staphylococcus aureus  Escherichia coli ESBL          Rapid RVP Result: NotDetec (09-20 @ 15:35)        RADIOLOGY:  Images independently visualized and reviewed personally, findings as below  < from: CT Internal Auditory Canals No Cont (09.12.23 @ 17:13) >  IMPRESSION:    1. Acute on chronic left-sided otitis externa and acute on chronic   left-sided otomastoiditis. 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 Superimposed cholesteatoma formation within the left   tympanomastoid cavity cannot be excluded. No evidence of malignant otitis   externa.    2. Chronic right-sided mastoiditis.    < end of copied text >  < from: Transthoracic Echocardiogram (09.06.23 @ 14:05) >  CONCLUSIONS:  Limited study to evaluate for endocarditis.  Unable to  exclude endocarditis.  Consider STEPHANIE for further evaluation,  if clinically indicated.    < end of copied text >

## 2023-09-21 NOTE — PROGRESS NOTE ADULT - SUBJECTIVE AND OBJECTIVE BOX
Date: 2023    PATIENT:  Maria L Raza  :          2016  DEMETRI:          Dec 5, 2022    Dear Mago Louie:    I had the pleasure of seeing your patient, Maria L Raza, for a follow-up visit in the [facility] on Dec 5, 2022 at the [facility].  Maria L was last seen in this clinic on 8/10/2022 and has had one additional RD visit since then.  Please see below for my assessment and plan of care.    Intercurrent History:  Maria L was accompanied to this appointment by her mother. As you may recall, Maria L is a 6 year old girl with a BMI in the severe obesity range (defined as BMI >/ 120% of the 95th percentile) complicated by acanthosis nigricans. Maria L continues to take metformin 500 mg daily. Mom is concerned about the skin around Tios neck getting darker.      Eating Changes at Home:  - no chips at home - mom doesn't buy   - drinking a lot more water; not buying juice, sweet tea   - snacks will be popcorn or crackers or fruit   - eating more fruit   - limits red meat - opt for chicken   - At dad's - Friday after school through Monday morning; no snacks, tacos; spaghetti; sugar-free drinks     Recent Diet Recall:  Breakfast: 2-3x/week school breakfast or 2-3x/week home breakfast    Lunch: school lunch    Home form school around 3:30pm - Mom picks up from school   Snacks after school: fruit   Dinner: chicken nuggets; hamburger    Snacks: after dinner - fruit cup    Drinks: water, sugar-free options    Out: 2x/month      Social history: Maria L is in 3rd grade.      Current Medications:  Current Outpatient Rx   Medication Sig Dispense Refill     metFORMIN (GLUCOPHAGE XR) 500 MG 24 hr tablet Take 2 tablets (1,000 mg) by mouth daily (with dinner) 60 tablet 2     Multiple Vitamins-Iron (MULTIVITAMIN/IRON PO)        VITAMIN D PO          Physical Exam:    Vitals:    B/P:   BP Readings from Last 1  "Encounters:   22 114/66 (91 %, Z = 1.34 /  73 %, Z = 0.61)*     *BP percentiles are based on the 2017 AAP Clinical Practice Guideline for girls     BP:  Blood pressure percentiles are 91 % systolic and 73 % diastolic based on the 2017 AAP Clinical Practice Guideline. Blood pressure percentile targets: 90: 114/73, 95: 118/75, 95 + 12 mmH/87. This reading is in the elevated blood pressure range (BP >= 90th percentile).  P:   Pulse Readings from Last 1 Encounters:   22 77       Measured Weights:  Wt Readings from Last 4 Encounters:   22 66.8 kg (147 lb 4.3 oz) (>99 %, Z= 3.20)*   10/19/22 61.2 kg (135 lb) (>99 %, Z= 3.04)*   08/10/22 62 kg (136 lb 11 oz) (>99 %, Z= 3.13)*   22 62.1 kg (137 lb) (>99 %, Z= 3.16)*     * Growth percentiles are based on CDC (Girls, 2-20 Years) data.       Height:    Ht Readings from Last 4 Encounters:   22 1.457 m (4' 9.36\") (99 %, Z= 2.24)*   08/10/22 1.456 m (4' 9.32\") (>99 %, Z= 2.52)*   22 1.444 m (4' 8.85\") (>99 %, Z= 2.49)*   22 1.425 m (4' 8.1\") (>99 %, Z= 2.34)*     * Growth percentiles are based on CDC (Girls, 2-20 Years) data.       Body Mass Index:  Body mass index is 31.47 kg/m .  Body Mass Index Percentile:  >99 %ile (Z= 2.65) based on CDC (Girls, 2-20 Years) BMI-for-age based on BMI available as of 2022.     Skin exam consistent with acanthosis nigricans     Labs:  Non-fasting   Results for orders placed or performed in visit on 22   Hemoglobin A1c     Status: Abnormal   Result Value Ref Range    Hemoglobin A1C 5.7 (H) 0.0 - 5.6 %   Vitamin D Deficiency     Status: Normal   Result Value Ref Range    Vitamin D, Total (25-Hydroxy) 56 20 - 75 ug/L    Narrative    Season, race, dietary intake, and treatment affect the concentration of 25-hydroxy-Vitamin D. Values may decrease during winter months and increase during summer months. Values 20-29 ug/L may indicate Vitamin D insufficiency and values <20 ug/L may indicate " Vitamin D deficiency.    Vitamin D determination is routinely performed by an immunoassay specific for 25 hydroxyvitamin D3.  If an individual is on vitamin D2(ergocalciferol) supplementation, please specify 25 OH vitamin D2 and D3 level determination by LCMSMS test [id number].     ALT     Status: Normal   Result Value Ref Range    ALT 23 0 - 50 U/L   AST     Status: Normal   Result Value Ref Range    AST 18 0 - 50 U/L   Basic metabolic panel     Status: Abnormal   Result Value Ref Range    Sodium 138 133 - 143 mmol/L    Potassium 3.7 3.4 - 5.3 mmol/L    Chloride 105 96 - 110 mmol/L    Carbon Dioxide (CO2) 26 20 - 32 mmol/L    Anion Gap 7 3 - 14 mmol/L    Urea Nitrogen 15 9 - 22 mg/dL    Creatinine 0.51 0.15 - 0.53 mg/dL    Calcium 10.0 8.5 - 10.1 mg/dL    Glucose 105 (H) 70 - 99 mg/dL    GFR Estimate     Ferritin     Status: Normal   Result Value Ref Range    Ferritin 34 7 - 142 ng/mL   CBC with platelets     Status: Normal   Result Value Ref Range    WBC Count 7.1 5.0 - 14.5 10e3/uL    RBC Count 4.46 3.70 - 5.30 10e6/uL    Hemoglobin 12.0 10.5 - 14.0 g/dL    Hematocrit 37.3 31.5 - 43.0 %    MCV 84 70 - 100 fL    MCH 26.9 26.5 - 33.0 pg    MCHC 32.2 31.5 - 36.5 g/dL    RDW 12.7 10.0 - 15.0 %    Platelet Count 391 150 - 450 10e3/uL         Assessment:  Maria L is a 6 year old female with a BMI in the severe obesity range (defined as a BMI >/ 120% of the 95th percentile) complicated by prediabetes. On physical exam, the darkening around Lauryn neck appears consistent with acanthosis nigricans. Hgb A1c obtained today is elevated now the prediabetes range, which is consistent with Mom history of noticing that Tios skin is becoming darker. We will plan to increase her current dose of metformin both for weight management but also for treatment of her insulin resistance.         Maria L gil current problem list reviewed today includes:    Encounter Diagnoses   Name Primary?     Severe obesity (H) Yes     Acanthosis  nigricans      Vitamin D deficiency      Iron deficiency         Care Plan:  Severe Obesity: % of the 95th percentile   - Lifestyle modification therapy: Let's plan for the next dietitian visit to be virtual so Maria L's dad can join.   - Pharmacotherapy:   - Increase metformin to 1000 mg daily    - Metformin prescription was changed to the extended-release, so Maria L can take all the metformin once per day    - Transition from taking one 500 mg tablet with dinner to taking two metformin 500 mg extended-release tablets daily    - Continue taking metformin with food       - Screening labs - labs obtained today      Prediabetes: Hgb A1c 5.7%    - Continue weight management plan, as noted above, including increase of metformin dose       Vitamin D Deficiency: resolved   - Continue supplementation prescribed by PCP   - Vitamin D repeated today       We are looking forward to seeing Maria L for a follow-up RD visit in 1 month and visit with me in 2 months.     Assessment requiring an independent historian(s) - family - mother  Prescription drug management  40 minutes spent on the date of the encounter doing review of test results, patient visit and documentation     Thank you for including me in the care of your patient.  Please do not hesitate to call with questions or concerns.    Sincerely,    Paulina Geller MD, MS    American Board of Obesity Medicine Diplomate  Department of Pediatrics  [facility]              CC  Copy to patient  Sepideh Herrera   [address] [id number]  [place] [address]   Subjective: Patient seen and examined. No new events except as noted.     REVIEW OF SYSTEMS:  Unable to obtain      MEDICATIONS:  MEDICATIONS  (STANDING):  acetic acid 0.25% Topical Irrigation 1 Application(s) Topical two times a day  albuterol/ipratropium for Nebulization 3 milliLiter(s) Nebulizer every 6 hours  atorvastatin 40 milliGRAM(s) Oral at bedtime  buMETAnide Injectable 2 milliGRAM(s) IV Push two times a day  chlorhexidine 0.12% Liquid 15 milliLiter(s) Oral Mucosa every 12 hours  chlorhexidine 2% Cloths 1 Application(s) Topical daily  dextrose 5%. 1000 milliLiter(s) (50 mL/Hr) IV Continuous <Continuous>  dextrose 5%. 1000 milliLiter(s) (100 mL/Hr) IV Continuous <Continuous>  dextrose 50% Injectable 12.5 Gram(s) IV Push once  dextrose 50% Injectable 25 Gram(s) IV Push once  dextrose 50% Injectable 25 Gram(s) IV Push once  doxazosin 6 milliGRAM(s) Oral <User Schedule>  epoetin odalis (EPOGEN) Injectable 69810 Unit(s) SubCutaneous <User Schedule>  ferrous    sulfate Liquid 300 milliGRAM(s) Enteral Tube daily  glucagon  Injectable 1 milliGRAM(s) IntraMuscular once  heparin  Infusion 1050 Unit(s)/Hr (10.5 mL/Hr) IV Continuous <Continuous>  insulin lispro (ADMELOG) corrective regimen sliding scale   SubCutaneous every 6 hours  insulin NPH human recombinant 3 Unit(s) SubCutaneous every 6 hours  meropenem  IVPB 500 milliGRAM(s) IV Intermittent every 12 hours  mupirocin 2% Ointment 1 Application(s) Topical two times a day  mupirocin 2% Ointment 1 Application(s) Both Nostrils two times a day  pantoprazole  Injectable 40 milliGRAM(s) IV Push two times a day  psyllium Powder 1 Packet(s) Oral daily  sodium chloride 2 Gram(s) Oral two times a day      PHYSICAL EXAM:  T(C): 37 (09-21-23 @ 03:12), Max: 37.9 (09-20-23 @ 14:38)  HR: 101 (09-21-23 @ 08:53) (101 - 114)  BP: 113/47 (09-21-23 @ 03:12) (104/52 - 121/45)  RR: 17 (09-21-23 @ 03:12) (17 - 17)  SpO2: 95% (09-21-23 @ 08:57) (94% - 100%)  Wt(kg): --  I&O's Summary    20 Sep 2023 07:01  -  21 Sep 2023 07:00  --------------------------------------------------------  IN: 1290 mL / OUT: 1140 mL / NET: 150 mL          Appearance: NAD, +trach  HEENT: dry oral mucosa  Lymphatic: No lymphadenopathy  Cardiovascular: Normal S1 S2, No JVD, No murmurs, No edema  Respiratory: Decreased BS, + trach to vent	  Neuro: opens eyes  Gastrointestinal: Soft, Non-tender, + BS, + NGT  Skin: No rashes, No ecchymoses, No cyanosis	  Extremities: No strength/ROM 2/2 sedation, + BL LE edema  Vascular: Peripheral pulses palpable 2+ bilaterally    Mode: AC/ CMV (Assist Control/ Continuous Mandatory Ventilation), RR (machine): 17, TV (machine): 340, FiO2: 35, PEEP: 8, ITime: 0.84, MAP: 15, PIP: 46  Arterial Blood Gas:  09-19 @ 17:22  7.28/52/75/24/97.9/-3.0    LABS:    CARDIAC MARKERS:                                8.0    5.10  )-----------( 186      ( 21 Sep 2023 05:50 )             24.6     09-21    126<L>  |  91<L>  |  92<H>  ----------------------------<  216<H>  4.3   |  25  |  2.44<H>    Ca    8.6      21 Sep 2023 05:50  Phos  3.6     09-21  Mg     2.40     09-21    TPro  5.4<L>  /  Alb  1.7<L>  /  TBili  0.2  /  DBili  x   /  AST  37<H>  /  ALT  10  /  AlkPhos  333<H>  09-21    proBNP:   Lipid Profile:   HgA1c:   TSH:     Negative          TELEMETRY: 	    ECG:  	  RADIOLOGY:   DIAGNOSTIC TESTING:  [ ] Echocardiogram:  [ ]  Catheterization:  [ ] Stress Test:    OTHER:

## 2023-09-21 NOTE — PROGRESS NOTE ADULT - SUBJECTIVE AND OBJECTIVE BOX
CHIEF COMPLAINT: Patient is a 73y old  Female who presents with a chief complaint of Respiratory distress (20 Sep 2023 22:41)    INTERVAL EVENTS: No acute events overnight.    REVIEW OF SYSTEMS: Seen and evaluated by bedside during AM rounds.      Mode: AC/ CMV (Assist Control/ Continuous Mandatory Ventilation), RR (machine): 17, TV (machine): 340, FiO2: 35, PEEP: 8, ITime: 0.84, MAP: 15, PIP: 46  Arterial Blood Gas:   @ 17:22  7.28/52/75/24/97.9/-3.0  ABG lactate: --      OBJECTIVE:  ICU Vital Signs Last 24 Hrs  T(C): 37 (21 Sep 2023 03:12), Max: 38.3 (20 Sep 2023 07:43)  T(F): 98.6 (21 Sep 2023 03:12), Max: 101 (20 Sep 2023 07:43)  HR: 105 (21 Sep 2023 03:16) (98 - 114)  BP: 113/47 (21 Sep 2023 03:12) (104/52 - 146/31)  BP(mean): --  ABP: --  ABP(mean): --  RR: 17 (21 Sep 2023 03:12) (17 - 17)  SpO2: 95% (21 Sep 2023 03:16) (94% - 100%)    O2 Parameters below as of 21 Sep 2023 03:16  Patient On (Oxygen Delivery Method): ventilator          Mode: AC/ CMV (Assist Control/ Continuous Mandatory Ventilation), RR (machine): 17, TV (machine): 340, FiO2: 35, PEEP: 8, ITime: 0.84, MAP: 15, PIP: 46     @ 07:  -   @ 07:00  --------------------------------------------------------  IN: 2168 mL / OUT: 531 mL / NET: 1637 mL     @ 07:  -   @ 06:47  --------------------------------------------------------  IN: 820 mL / OUT: 690 mL / NET: 130 mL      CAPILLARY BLOOD GLUCOSE      POCT Blood Glucose.: 226 mg/dL (21 Sep 2023 05:39)      HOSPITAL MEDICATIONS:  MEDICATIONS  (STANDING):  acetic acid 0.25% Topical Irrigation 1 Application(s) Topical two times a day  albuterol/ipratropium for Nebulization 3 milliLiter(s) Nebulizer every 6 hours  atorvastatin 40 milliGRAM(s) Oral at bedtime  buMETAnide Injectable 2 milliGRAM(s) IV Push two times a day  chlorhexidine 0.12% Liquid 15 milliLiter(s) Oral Mucosa every 12 hours  chlorhexidine 2% Cloths 1 Application(s) Topical daily  dextrose 5%. 1000 milliLiter(s) (50 mL/Hr) IV Continuous <Continuous>  dextrose 5%. 1000 milliLiter(s) (100 mL/Hr) IV Continuous <Continuous>  dextrose 50% Injectable 12.5 Gram(s) IV Push once  dextrose 50% Injectable 25 Gram(s) IV Push once  dextrose 50% Injectable 25 Gram(s) IV Push once  doxazosin 6 milliGRAM(s) Oral <User Schedule>  epoetin odalis (EPOGEN) Injectable 85985 Unit(s) SubCutaneous <User Schedule>  ferrous    sulfate Liquid 300 milliGRAM(s) Enteral Tube daily  glucagon  Injectable 1 milliGRAM(s) IntraMuscular once  insulin lispro (ADMELOG) corrective regimen sliding scale   SubCutaneous every 6 hours  insulin NPH human recombinant 3 Unit(s) SubCutaneous every 6 hours  meropenem  IVPB 500 milliGRAM(s) IV Intermittent every 12 hours  mupirocin 2% Ointment 1 Application(s) Topical two times a day  mupirocin 2% Ointment 1 Application(s) Both Nostrils two times a day  pantoprazole  Injectable 40 milliGRAM(s) IV Push two times a day  psyllium Powder 1 Packet(s) Oral daily  sodium chloride 2 Gram(s) Oral two times a day    MEDICATIONS  (PRN):  acetaminophen   Oral Liquid .. 650 milliGRAM(s) Oral every 6 hours PRN Temp greater or equal to 38C (100.4F), Mild Pain (1 - 3)  dextrose Oral Gel 15 Gram(s) Oral once PRN Blood Glucose LESS THAN 70 milliGRAM(s)/deciliter      PHYSICAL EXAMINATION  General: Resting comfortably. Family at bedside.  HEENT: Normocephalic/atraumatic. + purulent discharge from left ear + NGT + RIJ Shiley  Cardiovascular: Normal rate and regular rhythm.   Respiratory: + AC VC 17/340/+8/30&%. Breath sounds clear and equal bilaterally without rales, wheezing, or rhonchi. No accessory muscles used.   Abdomen: Soft, nontender, nondistended.   Skin: Warm to touch. No rashes, wounds, or skin lesions noted.   Extremities: + BL edema lower extremities. No clubbing, cyanosis, or varicose veins.   Neuro: Unable to assess ROS.      LABS:                        8.1    4.64  )-----------( 171      ( 20 Sep 2023 06:02 )             24.8     09-20    125<L>  |  91<L>  |  103<H>  ----------------------------<  230<H>  4.6   |  23  |  2.52<H>    Ca    8.3<L>      20 Sep 2023 06:02  Phos  3.7       Mg     2.40         TPro  4.9<L>  /  Alb  1.6<L>  /  TBili  0.2  /  DBili  x   /  AST  38<H>  /  ALT  13  /  AlkPhos  315<H>      PTT - ( 20 Sep 2023 08:30 )  PTT:43.3 sec  Urinalysis Basic - ( 20 Sep 2023 15:35 )    Color: Yellow / Appearance: Turbid / S.013 / pH: x  Gluc: x / Ketone: Negative mg/dL  / Bili: Negative / Urobili: 0.2 mg/dL   Blood: x / Protein: 100 mg/dL / Nitrite: Negative   Leuk Esterase: Large / RBC: 92 /HPF /  /HPF   Sq Epi: x / Non Sq Epi: 13 /HPF / Bacteria: Moderate /HPF      Arterial Blood Gas:   @ 17:22  7.28/52/75/24/97.9/-3.0  ABG lactate: --        PAST MEDICAL & SURGICAL HISTORY:  Breast CA      Diabetes      Stroke      Cardiac arrest      HTN (hypertension)      H/O mastectomy, bilateral          FAMILY HISTORY:  No pertinent family history in first degree relatives        Social History:      RADIOLOGY:  [ ] Reviewed and interpreted by me    PULMONARY FUNCTION TESTS:    EKG: CHIEF COMPLAINT: Patient is a 73y old  Female who presents with a chief complaint of Respiratory distress     INTERVAL EVENTS: No acute events overnight.    REVIEW OF SYSTEMS: Seen and evaluated by bedside during AM rounds.  Unable to assess due to poor mentation.     Mode: AC/ CMV (Assist Control/ Continuous Mandatory Ventilation), RR (machine): 17, TV (machine): 340, FiO2: 35, PEEP: 8, ITime: 0.84, MAP: 15, PIP: 46  Arterial Blood Gas:   @ 17:22  7.28/52/75/24/97.9/-3.0  ABG lactate: --      OBJECTIVE:  ICU Vital Signs Last 24 Hrs  T(C): 37 (21 Sep 2023 03:12), Max: 38.3 (20 Sep 2023 07:43)  T(F): 98.6 (21 Sep 2023 03:12), Max: 101 (20 Sep 2023 07:43)  HR: 105 (21 Sep 2023 03:16) (98 - 114)  BP: 113/47 (21 Sep 2023 03:12) (104/52 - 146/31)  BP(mean): --  ABP: --  ABP(mean): --  RR: 17 (21 Sep 2023 03:12) (17 - 17)  SpO2: 95% (21 Sep 2023 03:16) (94% - 100%)    O2 Parameters below as of 21 Sep 2023 03:16  Patient On (Oxygen Delivery Method): ventilator          Mode: AC/ CMV (Assist Control/ Continuous Mandatory Ventilation), RR (machine): 17, TV (machine): 340, FiO2: 35, PEEP: 8, ITime: 0.84, MAP: 15, PIP: 46     @ 07:  -   @ 07:00  --------------------------------------------------------  IN: 2168 mL / OUT: 531 mL / NET: 1637 mL     @ 07:  -   @ 06:47  --------------------------------------------------------  IN: 820 mL / OUT: 690 mL / NET: 130 mL      CAPILLARY BLOOD GLUCOSE      POCT Blood Glucose.: 226 mg/dL (21 Sep 2023 05:39)      HOSPITAL MEDICATIONS:  MEDICATIONS  (STANDING):  acetic acid 0.25% Topical Irrigation 1 Application(s) Topical two times a day  albuterol/ipratropium for Nebulization 3 milliLiter(s) Nebulizer every 6 hours  atorvastatin 40 milliGRAM(s) Oral at bedtime  buMETAnide Injectable 2 milliGRAM(s) IV Push two times a day  chlorhexidine 0.12% Liquid 15 milliLiter(s) Oral Mucosa every 12 hours  chlorhexidine 2% Cloths 1 Application(s) Topical daily  dextrose 5%. 1000 milliLiter(s) (50 mL/Hr) IV Continuous <Continuous>  dextrose 5%. 1000 milliLiter(s) (100 mL/Hr) IV Continuous <Continuous>  dextrose 50% Injectable 12.5 Gram(s) IV Push once  dextrose 50% Injectable 25 Gram(s) IV Push once  dextrose 50% Injectable 25 Gram(s) IV Push once  doxazosin 6 milliGRAM(s) Oral <User Schedule>  epoetin odalis (EPOGEN) Injectable 27912 Unit(s) SubCutaneous <User Schedule>  ferrous    sulfate Liquid 300 milliGRAM(s) Enteral Tube daily  glucagon  Injectable 1 milliGRAM(s) IntraMuscular once  insulin lispro (ADMELOG) corrective regimen sliding scale   SubCutaneous every 6 hours  insulin NPH human recombinant 3 Unit(s) SubCutaneous every 6 hours  meropenem  IVPB 500 milliGRAM(s) IV Intermittent every 12 hours  mupirocin 2% Ointment 1 Application(s) Topical two times a day  mupirocin 2% Ointment 1 Application(s) Both Nostrils two times a day  pantoprazole  Injectable 40 milliGRAM(s) IV Push two times a day  psyllium Powder 1 Packet(s) Oral daily  sodium chloride 2 Gram(s) Oral two times a day    MEDICATIONS  (PRN):  acetaminophen   Oral Liquid .. 650 milliGRAM(s) Oral every 6 hours PRN Temp greater or equal to 38C (100.4F), Mild Pain (1 - 3)  dextrose Oral Gel 15 Gram(s) Oral once PRN Blood Glucose LESS THAN 70 milliGRAM(s)/deciliter      PHYSICAL EXAMINATION  General: Resting comfortably. Family at bedside.  HEENT: Normocephalic/atraumatic. + purulent discharge from left ear + NGT. s/p RIJ Shiley  Cardiovascular: Normal rate and regular rhythm.   Respiratory: + AC VC 17/340/+8/30&%. Breath sounds clear and equal bilaterally without rales, wheezing, or rhonchi. No accessory muscles used.   Abdomen: Soft, nontender, nondistended.   Skin: Warm to touch. No rashes, wounds, or skin lesions noted.   Extremities: + BL edema lower extremities. No clubbing, cyanosis, or varicose veins.   Neuro: Unable to assess ROS.      LABS:                        8.1    4.64  )-----------( 171      ( 20 Sep 2023 06:02 )             24.8     09-20    125<L>  |  91<L>  |  103<H>  ----------------------------<  230<H>  4.6   |  23  |  2.52<H>    Ca    8.3<L>      20 Sep 2023 06:02  Phos  3.7       Mg     2.40         TPro  4.9<L>  /  Alb  1.6<L>  /  TBili  0.2  /  DBili  x   /  AST  38<H>  /  ALT  13  /  AlkPhos  315<H>  20    PTT - ( 20 Sep 2023 08:30 )  PTT:43.3 sec  Urinalysis Basic - ( 20 Sep 2023 15:35 )    Color: Yellow / Appearance: Turbid / S.013 / pH: x  Gluc: x / Ketone: Negative mg/dL  / Bili: Negative / Urobili: 0.2 mg/dL   Blood: x / Protein: 100 mg/dL / Nitrite: Negative   Leuk Esterase: Large / RBC: 92 /HPF /  /HPF   Sq Epi: x / Non Sq Epi: 13 /HPF / Bacteria: Moderate /HPF      Arterial Blood Gas:   @ 17:22  7.28/52/75/24/97.9/-3.0  ABG lactate: --        PAST MEDICAL & SURGICAL HISTORY:  Breast CA      Diabetes      Stroke      Cardiac arrest      HTN (hypertension)      H/O mastectomy, bilateral          FAMILY HISTORY:  No pertinent family history in first degree relatives        Social History:      RADIOLOGY:  [ ] Reviewed and interpreted by me    PULMONARY FUNCTION TESTS:    EKG:

## 2023-09-21 NOTE — PROGRESS NOTE ADULT - NS ATTEND AMEND GEN_ALL_CORE FT
Pt is a 73F with PMHx IDDMII, CKD, hx CVA, CHFpEF, latent TB (s/p tx,) hx breast cancer (2018, s/p mastectomy and adjuvant CT/RT), and hx CVA s/p trach/PEG (now decannulated) initially presenting to [facility] on 8/11/23 with acute hypoxemic and hypercapnic respiratory failure s/p ETT intubation/MV and ARF with pulmonary edema c/b HTN emergency requiring nicardipine drip transferred to MICU for further management.     Pt initially p/w worsening RUE shaking and dysconjugate gaze with MRI 8/17 (+) new right cerebellar, midbrain, and multiple tiny embolic infarcts as well as known left PCA CVA. EEG (-). 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 ILR in place from prior CVA evaluation, last interrogated 9/7 without AF. At baseline, pt reportedly wheelchair bound with RUE tremors and some ADL assistance. Pt was unable to tolerate ASA 2/2 GIB, will re-attempt once more stable. Worsening mentation this weekend possibly 2/2 metabolic encephalopathy in the setting of hyponatremia and uremia. No new focal deficits noted. Pt intermittently awakens but only for very short periods of time.    Pt with acute hypoxemic and hypercapnic respiratory failure s/p ETT intubation/MV possibly 2/2 flash pulmonary edema in the setting of HTN emergency +/- aspiration event. Pt with failure of ventilatory weaning now s/p tracheostomy placement (IP 9/1.) Of note, pt also noted to have evidence of midbrain CVA with possible concern for central effect on respiratory drive, will monitor carefully and continue weaning trials as tolerated. Pt able to spontaneously breath intermittently, will continue to attempt further weaning trials. Airway clearance therapy in place. Wean O2 supplementation for goal O2 saturation 90-95%. Oral hygiene and aspiration precautions in place. Trach care and suctioning as per RCU team.     Pt p/w ARF on CKD requiring initiation of HD 2/2 pulmonary edema and metabolic acidosis with metabolic encephalopathy. No urgent indication for HD today, further HD as per nephrology team. Finished prednisone taper for possible AIN. Strict I/Os, pt makes small amount of urine. Acute hypervolemic hyponatremia in the setting of renal failure not resolving despite hypertonic saline, diuretics, and salt tabs with worsening uremia. HD attempted, catheter non-functioning. Will remove today and monitor for a few days. If pt shows s/s requiring HD will need replacement catheter.     Hospital course further c/b recurrent infections, most recently with MSSA bacteremia (9/1, cleared 9/4 and 9/8) with (+) MSSA and ESBL EColi in BAL as well. Pt with possible cefepime vs. cefazolin drug-induced rash followed by transition to vancomycin. Was then found to have left otitis externa (9/5) now requiring serial bedside debridement with worsening necrotic tissue/erythema for which she was also initiated on meropenem. CTTB pending, family refusing contrast, non-contrast study obtained with evidence of bone involvement but no indication for further surgical intervention. Cultures NTD, pt remains febrile intermittently. Will continue with meropenem, CT imaging does not show significant enough bone involvement to require prolonged course for osteomyelitis. However, pt with new white unclear drainage from the ear x3 days. ENT reconsulted, recs appreciated. Plan to complete vancomycin by level x4 week course through 10/2 (given inability to exclude endocarditis). Yesterday, pt with recurrent fevers. Discussed with ID, recs appreciated. Will f/u repeat cx and obtain pan-CT scan. C/w meropenem for now.    Pt also with oropharyngeal dysphagia requiring NGTs followed by UGIB s/p EGD (8/31) found to have esophagitis/erosive gastropathy now on PPI BID. Pt further found to have popliteal DVT. No c/i by GI for A/C, pt initiated on heparin drip with patient-specific pTTs. Will need to ultimately transition to Coumadin x3-6 months after PEG placement.     Dispo pending medical optimization. Pt full code. DVT ppx full A/C with heparin drip. Discussed with pt's family ( and daughter) at bedside daily.

## 2023-09-21 NOTE — PROGRESS NOTE ADULT - SUBJECTIVE AND OBJECTIVE BOX
Overnight events noted      VITAL:  T(C): , Max: 38.2 (09-20-23 @ 08:02)  T(F): , Max: 100.7 (09-20-23 @ 08:02)  HR: 105 (09-21-23 @ 03:16)  BP: 113/47 (09-21-23 @ 03:12)  BP(mean): --  RR: 17 (09-21-23 @ 03:12)  SpO2: 95% (09-21-23 @ 03:16)  Wt(kg): --      PHYSICAL EXAM:  Constitutional: trach to vent  HEENT: + tracheostomy, + NGT  Respiratory: Coarse BS  Cardiovascular: S1 and S2  Gastrointestinal: BS+, soft, NT/ND  Extremities: + peripheral edema  Neurological: UTO  : +external catheter   Skin: No rashes  Access: [facility] HD catheter (8/19) - c/d/i      LABS:                        8.0    5.10  )-----------( 186      ( 21 Sep 2023 05:50 )             24.6     Na(126)/K(4.3)/Cl(91)/HCO3(25)/BUN(92)/Cr(2.44)Glu(216)/Ca(8.6)/Mg(2.40)/PO4(3.6)    09-21 @ 05:50  Na(125)/K(4.6)/Cl(91)/HCO3(23)/BUN(103)/Cr(2.52)Glu(230)/Ca(8.3)/Mg(2.40)/PO4(3.7)    09-20 @ 06:02  Na(124)/K(4.3)/Cl(90)/HCO3(23)/BUN(103)/Cr(2.64)Glu(231)/Ca(8.4)/Mg(2.50)/PO4(3.6)    09-19 @ 06:00        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 ?uremia of late; we attempted HD yesterday as well as the day prior from the shiley that has been in place for the past few weeks; the catheter was not able to be used successfully.     (3)Hyponatremia - low but slowly improving/stable, on NaCl tabs    (4)Pulm- hypercapnic respiratory failure on admission - now s/p trach; remains on vent     (5)ID - on IV Meropenem    (6)Anemia - receiving DAVID and PRBCs intermittently    (7)AMS - at least at times over the past 2-3 days, she has been mentating well; this argues against uremic encephalopathy    (8)Vasc - the RIJ shiley is no longer functioning. If we are to opt for further HD, she would require a new catheter      RECOMMEND:  (1)RIJ shiley removal  (2)NaCl tabs as ordered  (3)Attempting efforts to further forgo HD; if we are to re-perform HD, would plan for new shiley placement          Jimmy Colon MD  [facility]  Office/on call physician: [phone]  Cell (7a-7c): [phone]       overnight events noted      VITAL:  T(C): , Max: 38.2 (09-20-23 @ 08:02)  T(F): , Max: 100.7 (09-20-23 @ 08:02)  HR: 105 (09-21-23 @ 03:16)  BP: 113/47 (09-21-23 @ 03:12)  BP(mean): --  RR: 17 (09-21-23 @ 03:12)  SpO2: 95% (09-21-23 @ 03:16)  Wt(kg): --      PHYSICAL EXAM:  Constitutional: trach to vent  HEENT: + tracheostomy, + NGT  Respiratory: Coarse BS  Cardiovascular: tachy s1s2  Gastrointestinal: BS+, soft, NT/ND  Extremities: + peripheral edema  Neurological: UTO  : +external catheter   Skin: No rashes      LABS:                        8.0    5.10  )-----------( 186      ( 21 Sep 2023 05:50 )             24.6     Na(126)/K(4.3)/Cl(91)/HCO3(25)/BUN(92)/Cr(2.44)Glu(216)/Ca(8.6)/Mg(2.40)/PO4(3.6)    09-21 @ 05:50  Na(125)/K(4.6)/Cl(91)/HCO3(23)/BUN(103)/Cr(2.52)Glu(230)/Ca(8.3)/Mg(2.40)/PO4(3.7)    09-20 @ 06:02  Na(124)/K(4.3)/Cl(90)/HCO3(23)/BUN(103)/Cr(2.64)Glu(231)/Ca(8.4)/Mg(2.50)/PO4(3.6)    09-19 @ 06:00        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 ?uremia of late; we attempted HD yesterday as well as the day prior from the shiley that has been in place for the past few weeks; the catheter was not able to be used successfully.     (3)Hyponatremia - low but slowly improving/stable, on NaCl tabs    (4)Pulm- hypercapnic respiratory failure on admission - now s/p trach; remains on vent     (5)ID - on IV Meropenem    (6)Anemia - receiving DAVID and PRBCs intermittently    (7)AMS - at least at times over the past 2-3 days, she has been mentating well; this argues against uremic encephalopathy    (8)Vasc - the RIJ shiley is no longer functioning. If we are to opt for further HD, she would require a new catheter      RECOMMEND:  (1)RIJ shiley removal  (2)NaCl tabs as ordered  (3)Attempting efforts to further forgo HD; if we are to re-perform HD, would plan for new shiley placement          Jimmy Colon MD  [facility]  Office/on call physician: [phone]  Cell (7v-7i): [phone]

## 2023-09-21 NOTE — PROGRESS NOTE ADULT - ASSESSMENT
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 family declined renal biopsy started on prednisone  s/p trach 9/1 and BAL with MSSA   ET cx with ESBL and MSSA  started on ana cristina 9/1  blood cx 9/1: MSSA   repeat negative 9/4, 9/8  CXR with b/l opacities, R increased  L ear edema and otitis externa    initial  resp failure for ?fluid ovrer load but also had MSSA in the sputum, got vanco, aztreonam one day then cefepime and had rash, renal failure which was considered due to cefepime and then received 2 days of cefazolin and then off, now with trach and bronc on 9/1 with MSSA bacteremia and BAL also MSSA  another ET cx with MSSA and ESBL E-coli  hypotension, MSSA bacteremia likely due to pneumonia, repeat blood cx negative 9/4, TTE limited unable to exclude endocarditis  L otitis externa on ana cristina since 9/1 but worsening edema and black discoloration with bullae forming and persistent fever (ear cx was negative)  Ct showed acute on chronic otitis externa and otomastoiditis , superimposed cholesteatoma can not be excluded, no malignant otitis externa  pt again febrile, ENT stated there was black spores which could be a fungal infection and the last cx showed candida albicans  bleeding from HD site and shiley removed today    * add fluconazole 200 qd  * c/w ana cristina, started 9/1  * cannot do cefazolin so will have to treat with vanco  * c/w vanco per level  * will need a 4 week course of vanco for the MSSA bacteremia from the negative blood cx through 10/2  * monitor CBC/diff and renal function    The above assessment and plan was discussed with the primary team    Yumiko Conner MD  contact on teams  After 5pm and on weekends call [phone]

## 2023-09-22 NOTE — PROGRESS NOTE ADULT - SUBJECTIVE AND OBJECTIVE BOX
CHIEF COMPLAINT: Patient is a 73y old  Female who presents with a chief complaint of Respiratory distress (21 Sep 2023 16:59)      Interval Events:    REVIEW OF SYSTEMS:  [ ] All other systems negative  [ ] Unable to assess ROS because ________    Mode: AC/ CMV (Assist Control/ Continuous Mandatory Ventilation), RR (machine): 17, TV (machine): 340, FiO2: 35, PEEP: 8, ITime: 0.84, MAP: 13, PIP: 44      OBJECTIVE:  ICU Vital Signs Last 24 Hrs  T(C): 37.4 (21 Sep 2023 23:05), Max: 37.7 (21 Sep 2023 07:45)  T(F): 99.3 (21 Sep 2023 23:05), Max: 99.9 (21 Sep 2023 11:45)  HR: 99 (22 Sep 2023 03:47) (78 - 107)  BP: 93/32 (21 Sep 2023 23:05) (90/38 - 118/38)  BP(mean): --  ABP: --  ABP(mean): --  RR: 17 (21 Sep 2023 23:05) (16 - 17)  SpO2: 98% (22 Sep 2023 03:47) (95% - 99%)    O2 Parameters below as of 21 Sep 2023 23:05  Patient On (Oxygen Delivery Method): ventilator    O2 Concentration (%): 35      Mode: AC/ CMV (Assist Control/ Continuous Mandatory Ventilation), RR (machine): 17, TV (machine): 340, FiO2: 35, PEEP: 8, ITime: 0.84, MAP: 13, PIP: 44    09-21 @ 07:01  -  09-22 @ 07:00  --------------------------------------------------------  IN: 420 mL / OUT: 400 mL / NET: 20 mL      CAPILLARY BLOOD GLUCOSE      POCT Blood Glucose.: 190 mg/dL (22 Sep 2023 05:24)      HOSPITAL MEDICATIONS:  MEDICATIONS  (STANDING):  acetic acid 0.25% Topical Irrigation 1 Application(s) Topical two times a day  albuterol/ipratropium for Nebulization 3 milliLiter(s) Nebulizer every 6 hours  atorvastatin 40 milliGRAM(s) Oral at bedtime  buMETAnide Injectable 2 milliGRAM(s) IV Push two times a day  chlorhexidine 0.12% Liquid 15 milliLiter(s) Oral Mucosa every 12 hours  chlorhexidine 2% Cloths 1 Application(s) Topical daily  dextrose 5%. 1000 milliLiter(s) (50 mL/Hr) IV Continuous <Continuous>  dextrose 5%. 1000 milliLiter(s) (100 mL/Hr) IV Continuous <Continuous>  dextrose 50% Injectable 12.5 Gram(s) IV Push once  dextrose 50% Injectable 25 Gram(s) IV Push once  dextrose 50% Injectable 25 Gram(s) IV Push once  doxazosin 6 milliGRAM(s) Oral <User Schedule>  epoetin odalis (EPOGEN) Injectable 59551 Unit(s) SubCutaneous <User Schedule>  ferrous    sulfate Liquid 300 milliGRAM(s) Enteral Tube daily  fluconAZOLE IVPB 200 milliGRAM(s) IV Intermittent every 24 hours  glucagon  Injectable 1 milliGRAM(s) IntraMuscular once  heparin  Infusion 1050 Unit(s)/Hr (10.5 mL/Hr) IV Continuous <Continuous>  insulin lispro (ADMELOG) corrective regimen sliding scale   SubCutaneous every 6 hours  insulin NPH human recombinant 3 Unit(s) SubCutaneous every 6 hours  meropenem  IVPB 500 milliGRAM(s) IV Intermittent every 12 hours  mupirocin 2% Ointment 1 Application(s) Topical two times a day  pantoprazole  Injectable 40 milliGRAM(s) IV Push two times a day  psyllium Powder 1 Packet(s) Oral daily  sodium chloride 2 Gram(s) Oral two times a day    MEDICATIONS  (PRN):  acetaminophen   Oral Liquid .. 650 milliGRAM(s) Oral every 6 hours PRN Temp greater or equal to 38C (100.4F), Mild Pain (1 - 3)  calamine/zinc oxide Lotion 1 Application(s) Topical two times a day PRN Rash and/or Itching  dextrose Oral Gel 15 Gram(s) Oral once PRN Blood Glucose LESS THAN 70 milliGRAM(s)/deciliter      LABS:                        8.0    5.10  )-----------( 186      ( 21 Sep 2023 05:50 )             24.6     09-21    126<L>  |  91<L>  |  92<H>  ----------------------------<  216<H>  4.3   |  25  |  2.44<H>    Ca    8.6      21 Sep 2023 05:50  Phos  3.6     09-21  Mg     2.40     09-21    TPro  5.4<L>  /  Alb  1.7<L>  /  TBili  0.2  /  DBili  x   /  AST  37<H>  /  ALT  10  /  AlkPhos  333<H>  09-21    PTT - ( 21 Sep 2023 16:15 )  PTT:50.5 sec  Urinalysis Basic - ( 21 Sep 2023 05:50 )    Color: x / Appearance: x / SG: x / pH: x  Gluc: 216 mg/dL / Ketone: x  / Bili: x / Urobili: x   Blood: x / Protein: x / Nitrite: x   Leuk Esterase: x / RBC: x / WBC x   Sq Epi: x / Non Sq Epi: x / Bacteria: x            PAST MEDICAL & SURGICAL HISTORY:  Breast CA      Diabetes      Stroke      Cardiac arrest      HTN (hypertension)      H/O mastectomy, bilateral      FAMILY HISTORY:  No pertinent family history in first degree relatives        Social History:      RADIOLOGY:  [ ] Reviewed and interpreted by me    PULMONARY FUNCTION TESTS:    EKG: CHIEF COMPLAINT: Patient is a 73y old Female who presents with a chief complaint of Respiratory distress.      Interval Events: Pt w/ temp on 99.9.       REVIEW OF SYSTEMS:  [x] Unable to assess ROS because vented.      Mode: AC/ CMV (Assist Control/ Continuous Mandatory Ventilation), RR (machine): 17, TV (machine): 340, FiO2: 35, PEEP: 8, ITime: 0.84, MAP: 13, PIP: 44      OBJECTIVE:  ICU Vital Signs Last 24 Hrs  T(C): 37.4 (21 Sep 2023 23:05), Max: 37.7 (21 Sep 2023 07:45)  T(F): 99.3 (21 Sep 2023 23:05), Max: 99.9 (21 Sep 2023 11:45)  HR: 99 (22 Sep 2023 03:47) (78 - 107)  BP: 93/32 (21 Sep 2023 23:05) (90/38 - 118/38)  BP(mean): --  ABP: --  ABP(mean): --  RR: 17 (21 Sep 2023 23:05) (16 - 17)  SpO2: 98% (22 Sep 2023 03:47) (95% - 99%)    O2 Parameters below as of 21 Sep 2023 23:05  Patient On (Oxygen Delivery Method): ventilator    O2 Concentration (%): 35      Mode: AC/ CMV (Assist Control/ Continuous Mandatory Ventilation), RR (machine): 17, TV (machine): 340, FiO2: 35, PEEP: 8, ITime: 0.84, MAP: 13, PIP: 44    09-21 @ 07:01  -  09-22 @ 07:00  --------------------------------------------------------  IN: 420 mL / OUT: 400 mL / NET: 20 mL      CAPILLARY BLOOD GLUCOSE      POCT Blood Glucose.: 190 mg/dL (22 Sep 2023 05:24)      HOSPITAL MEDICATIONS:  MEDICATIONS  (STANDING):  acetic acid 0.25% Topical Irrigation 1 Application(s) Topical two times a day  albuterol/ipratropium for Nebulization 3 milliLiter(s) Nebulizer every 6 hours  atorvastatin 40 milliGRAM(s) Oral at bedtime  buMETAnide Injectable 2 milliGRAM(s) IV Push two times a day  chlorhexidine 0.12% Liquid 15 milliLiter(s) Oral Mucosa every 12 hours  chlorhexidine 2% Cloths 1 Application(s) Topical daily  dextrose 5%. 1000 milliLiter(s) (50 mL/Hr) IV Continuous <Continuous>  dextrose 5%. 1000 milliLiter(s) (100 mL/Hr) IV Continuous <Continuous>  dextrose 50% Injectable 12.5 Gram(s) IV Push once  dextrose 50% Injectable 25 Gram(s) IV Push once  dextrose 50% Injectable 25 Gram(s) IV Push once  doxazosin 6 milliGRAM(s) Oral <User Schedule>  epoetin odalis (EPOGEN) Injectable 94090 Unit(s) SubCutaneous <User Schedule>  ferrous    sulfate Liquid 300 milliGRAM(s) Enteral Tube daily  fluconAZOLE IVPB 200 milliGRAM(s) IV Intermittent every 24 hours  glucagon  Injectable 1 milliGRAM(s) IntraMuscular once  heparin  Infusion 1050 Unit(s)/Hr (10.5 mL/Hr) IV Continuous <Continuous>  insulin lispro (ADMELOG) corrective regimen sliding scale   SubCutaneous every 6 hours  insulin NPH human recombinant 3 Unit(s) SubCutaneous every 6 hours  meropenem  IVPB 500 milliGRAM(s) IV Intermittent every 12 hours  mupirocin 2% Ointment 1 Application(s) Topical two times a day  pantoprazole  Injectable 40 milliGRAM(s) IV Push two times a day  psyllium Powder 1 Packet(s) Oral daily  sodium chloride 2 Gram(s) Oral two times a day    MEDICATIONS  (PRN):  acetaminophen   Oral Liquid .. 650 milliGRAM(s) Oral every 6 hours PRN Temp greater or equal to 38C (100.4F), Mild Pain (1 - 3)  calamine/zinc oxide Lotion 1 Application(s) Topical two times a day PRN Rash and/or Itching  dextrose Oral Gel 15 Gram(s) Oral once PRN Blood Glucose LESS THAN 70 milliGRAM(s)/deciliter      LABS:                        8.0    5.10  )-----------( 186      ( 21 Sep 2023 05:50 )             24.6     09-21    126<L>  |  91<L>  |  92<H>  ----------------------------<  216<H>  4.3   |  25  |  2.44<H>    Ca    8.6      21 Sep 2023 05:50  Phos  3.6     09-21  Mg     2.40     09-21    TPro  5.4<L>  /  Alb  1.7<L>  /  TBili  0.2  /  DBili  x   /  AST  37<H>  /  ALT  10  /  AlkPhos  333<H>  09-21    PTT - ( 21 Sep 2023 16:15 )  PTT:50.5 sec  Urinalysis Basic - ( 21 Sep 2023 05:50 )    Color: x / Appearance: x / SG: x / pH: x  Gluc: 216 mg/dL / Ketone: x  / Bili: x / Urobili: x   Blood: x / Protein: x / Nitrite: x   Leuk Esterase: x / RBC: x / WBC x   Sq Epi: x / Non Sq Epi: x / Bacteria: x      PAST MEDICAL & SURGICAL HISTORY:  Breast CA      Diabetes      Stroke      Cardiac arrest      HTN (hypertension)      H/O mastectomy, bilateral      FAMILY HISTORY:  No pertinent family history in first degree relatives      Social History:      RADIOLOGY:  [ ] Reviewed and interpreted by me      PULMONARY FUNCTION TESTS:    EKG:

## 2023-09-22 NOTE — PROGRESS NOTE ADULT - ASSESSMENT
72 y/o Female w/ DM, CVA, HTN admitted for respiratory  failure s/p tracheostomy on 9/1/2023, bacteremia. ENT consulted for L OE 9/5. Requiring serial debridements, wick removed 9/13/2023, conchal bowl with healthy pink tissue, external ear canal clean and dry this morning, TM intact but thickened. No mastoid tenderness, Culture from 9/6/2023 with no growth. Patient started on heparin drip for acute left lower popliteal DVT on 9/11/2023 by primary team. 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 Superimposed left-sided tympanosclerosis. Superimposed cholesteatoma formation within the left tympanomastoid cavity cannot be excluded. No evidence of malignant otitis externa.  + MRSA on mupirocin. Patient is currently on meropenem per ID.     Problem/Plan - 1:  ·  Problem: Diffuse otitis externa, left ear.   ·  Plan: - Improved, no further debridement needed at this time  - Please keep pressure off from left ear at all times  - Continue with  broad spectrum antibiotics- now on meropenem , ciprodex x 10d  - Please apply mupirocin ointment to the left conchal bowl BID for 3 days till 9/16/2023  - No plan for any surgical intervention from ENT at this time, ENT signed off  - ID recommendation appreciated for duration and length of antibiotics  - Please follow up with Dr. Feng outpatient     Problem/Plan - 2:  ·  Problem: DM (diabetes mellitus).   ·  Plan: FSG control - FSG goal <150  - Rest of care per primary team.     Problem/Plan - 3:  ·  Problem: Mastoiditis.   ·  Plan: - Continue with  broad spectrum antibiotics- now on meropenem  - ID recommendation appreciated for duration and length of antibiotics  - No acute ENT intervention needed at this time  - ENT sign off  - Please follow up with Dr. Feng outpatient  Problem/Plan - 1:  ·  Problem: Diffuse otitis externa, left ear.   ·  Plan: - Improved, no further debridement needed at this time  - Please keep pressure off from left ear at all times  - Continue with  broad spectrum antibiotics- now on meropenem , ciprodex x 10d  - Please apply mupirocin ointment to the left conchal bowl BID for 3 days till 9/16/2023  - No plan for any surgical intervention from ENT at this time, ENT signed off  - ID recommendation appreciated for duration and length of antibiotics  - Please follow up with Dr. Feng outpatient     Problem/Plan - 2:  ·  Problem: DM (diabetes mellitus).   ·  Plan: FSG control - FSG goal <150  - Rest of care per primary team.     Problem/Plan - 3:  ·  Problem: Mastoiditis.   ·  Plan: - Continue with  broad spectrum antibiotics- now on meropenem  - ID recommendation appreciated for duration and length of antibiotics  - No acute ENT intervention needed at this time  - ENT sign off  - Please follow up with Dr. Feng outpatient 72 y/o F with PMH of DM, CVA, HTN admitted for respiratory failure s/p tracheostomy on 9/1/2023 and bacteremia. ENT was consulted for L OE 9/5. Requiring serial debridements, wick removed 9/13/2023, conchal bowl with some discoloration, and external ear canal with black spores and white spots this morning. Debridement was done at bedside with suction. TM was difficult to examine with debridement. Culture from 9/6/2023 with no growth. Patient was started on heparin drip for acute left lower popliteal DVT on 9/11/2023 by primary team. 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 Superimposed left-sided tympanosclerosis. Superimposed cholesteatoma formation within the left tympanomastoid cavity cannot be excluded. No evidence of malignant otitis externa.  + MRSA on mupirocin. Patient is currently on meropenem and vancomycin per ID. 72 y/o F with PMH of DM, CVA, HTN admitted for respiratory failure s/p tracheostomy on 9/1/2023 and bacteremia. ENT was consulted for L OE 9/5. Requiring serial debridements, wick removed 9/13/2023, conchal bowl with some discoloration, and external ear canal with black spores and white spots this morning. Debridement was done at bedside with suction. TM was difficult to examine with debridement. Culture from 9/6/2023 with no growth. Patient was started on heparin drip for acute left lower popliteal DVT on 9/11/2023 by primary team. 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 Superimposed left-sided tympanosclerosis. Superimposed cholesteatoma formation within the left tympanomastoid cavity cannot be excluded. No evidence of malignant otitis externa.  + MRSA on mupirocin. ENT saw on 9/20 for white purulent discharge from left ear and acetic acid and mupirocin were given. Patient is currently on meropenem and vancomycin per ID. Fluconazole was added as well on 9/21.

## 2023-09-22 NOTE — PROGRESS NOTE ADULT - SUBJECTIVE AND OBJECTIVE BOX
Patient is a 73y old  Female who presents with a chief complaint of Respiratory distress (22 Sep 2023 16:30)      SUBJECTIVE / OVERNIGHT EVENTS: fluconazole started 9/21, ptn has an allergic rash. dount its 2/2 to MEROPENEM. i feel its 2/2 FLUCONAZOLE. MEropenem was DCed. on VANCO based on levels for MSSA bacteremia    MEDICATIONS  (STANDING):  acetic acid 0.25% Topical Irrigation 1 Application(s) Topical two times a day  albuterol/ipratropium for Nebulization 3 milliLiter(s) Nebulizer every 6 hours  atorvastatin 40 milliGRAM(s) Oral at bedtime  buMETAnide Injectable 2 milliGRAM(s) IV Push two times a day  chlorhexidine 0.12% Liquid 15 milliLiter(s) Oral Mucosa every 12 hours  chlorhexidine 2% Cloths 1 Application(s) Topical daily  dextrose 5%. 1000 milliLiter(s) (50 mL/Hr) IV Continuous <Continuous>  dextrose 5%. 1000 milliLiter(s) (100 mL/Hr) IV Continuous <Continuous>  dextrose 50% Injectable 12.5 Gram(s) IV Push once  dextrose 50% Injectable 25 Gram(s) IV Push once  dextrose 50% Injectable 25 Gram(s) IV Push once  epoetin odalis (EPOGEN) Injectable 95986 Unit(s) SubCutaneous <User Schedule>  ferrous    sulfate Liquid 300 milliGRAM(s) Enteral Tube daily  fluconAZOLE IVPB 200 milliGRAM(s) IV Intermittent every 24 hours  glucagon  Injectable 1 milliGRAM(s) IntraMuscular once  heparin  Infusion 1050 Unit(s)/Hr (10.5 mL/Hr) IV Continuous <Continuous>  insulin lispro (ADMELOG) corrective regimen sliding scale   SubCutaneous every 6 hours  insulin NPH human recombinant 3 Unit(s) SubCutaneous every 6 hours  midodrine. 10 milliGRAM(s) Oral three times a day  mupirocin 2% Ointment 1 Application(s) Topical two times a day  pantoprazole  Injectable 40 milliGRAM(s) IV Push two times a day  psyllium Powder 1 Packet(s) Oral daily  sodium chloride 2 Gram(s) Oral two times a day    MEDICATIONS  (PRN):  acetaminophen   Oral Liquid .. 650 milliGRAM(s) Oral every 6 hours PRN Temp greater or equal to 38C (100.4F), Mild Pain (1 - 3)  calamine/zinc oxide Lotion 1 Application(s) Topical two times a day PRN Rash and/or Itching  dextrose Oral Gel 15 Gram(s) Oral once PRN Blood Glucose LESS THAN 70 milliGRAM(s)/deciliter      Vital Signs Last 24 Hrs  T(F): 98.6 (09-22-23 @ 20:00), Max: 99.4 (09-22-23 @ 04:59)  HR: 103 (09-22-23 @ 20:00) (98 - 110)  BP: 93/50 (09-22-23 @ 20:00) (89/39 - 93/50)  RR: 15 (09-22-23 @ 20:00) (15 - 17)  SpO2: 96% (09-22-23 @ 20:00) (95% - 100%)  Telemetry:   CAPILLARY BLOOD GLUCOSE      POCT Blood Glucose.: 240 mg/dL (22 Sep 2023 17:27)  POCT Blood Glucose.: 230 mg/dL (22 Sep 2023 11:23)  POCT Blood Glucose.: 190 mg/dL (22 Sep 2023 05:24)  POCT Blood Glucose.: 227 mg/dL (21 Sep 2023 23:14)    I&O's Summary    21 Sep 2023 07:01  -  22 Sep 2023 07:00  --------------------------------------------------------  IN: 840 mL / OUT: 800 mL / NET: 40 mL        PHYSICAL EXAM:  GENERAL: NAD, well-developed  HEAD:  Atraumatic, Normocephalic  EYES: EOMI, PERRLA, conjunctiva and sclera clear  NECK: Supple, No JVD  CHEST/LUNG: Clear to auscultation bilaterally; No wheeze  HEART: Regular rate and rhythm; No murmurs, rubs, or gallops  ABDOMEN: Soft, Nontender, Nondistended; Bowel sounds present  EXTREMITIES:  2+ Peripheral Pulses, No clubbing, cyanosis, or edema  PSYCH: AAOx3  NEUROLOGY: non-focal  SKIN: No rashes or lesions    LABS:                        9.0    5.40  )-----------( 190      ( 22 Sep 2023 07:40 )             27.6     09-22    126<L>  |  94<L>  |  97<H>  ----------------------------<  210<H>  4.7   |  24  |  2.52<H>    Ca    8.8      22 Sep 2023 07:40  Phos  3.7     09-22  Mg     2.40     09-22    TPro  5.7<L>  /  Alb  1.8<L>  /  TBili  0.3  /  DBili  x   /  AST  33<H>  /  ALT  11  /  AlkPhos  342<H>  09-22    PTT - ( 22 Sep 2023 16:11 )  PTT:63.1 sec      Urinalysis Basic - ( 22 Sep 2023 07:40 )    Color: x / Appearance: x / SG: x / pH: x  Gluc: 210 mg/dL / Ketone: x  / Bili: x / Urobili: x   Blood: x / Protein: x / Nitrite: x   Leuk Esterase: x / RBC: x / WBC x   Sq Epi: x / Non Sq Epi: x / Bacteria: x        RADIOLOGY & ADDITIONAL TESTS:    Imaging Personally Reviewed:    Consultant(s) Notes Reviewed:      Care Discussed with Consultants/Other Providers:

## 2023-09-22 NOTE — PROGRESS NOTE ADULT - SUBJECTIVE AND OBJECTIVE BOX
Follow Up:  MSSA bacteremia    Interval History: no more fever but now has an erythematous rash    ROS:    Unobtainable because: trached      Allergies  isoniazid (Rash)  nafcillin (Unknown)  hydrALAZINE (Rash)  vitamin E (Short breath; Urticaria; Hives)  doxycycline (Rash)  cefepime (Rash)  NIFEdipine (Urticaria; Hives)        ANTIMICROBIALS:  fluconAZOLE IVPB 200 every 24 hours  vancomycin  IVPB 750 once      OTHER MEDS:  acetaminophen   Oral Liquid .. 650 milliGRAM(s) Oral every 6 hours PRN  acetic acid 0.25% Topical Irrigation 1 Application(s) Topical two times a day  albuterol/ipratropium for Nebulization 3 milliLiter(s) Nebulizer every 6 hours  atorvastatin 40 milliGRAM(s) Oral at bedtime  buMETAnide Injectable 2 milliGRAM(s) IV Push two times a day  calamine/zinc oxide Lotion 1 Application(s) Topical two times a day PRN  chlorhexidine 0.12% Liquid 15 milliLiter(s) Oral Mucosa every 12 hours  chlorhexidine 2% Cloths 1 Application(s) Topical daily  dextrose 5%. 1000 milliLiter(s) IV Continuous <Continuous>  dextrose 5%. 1000 milliLiter(s) IV Continuous <Continuous>  dextrose 50% Injectable 12.5 Gram(s) IV Push once  dextrose 50% Injectable 25 Gram(s) IV Push once  dextrose 50% Injectable 25 Gram(s) IV Push once  dextrose Oral Gel 15 Gram(s) Oral once PRN  epoetin odalis (EPOGEN) Injectable 64731 Unit(s) SubCutaneous <User Schedule>  ferrous    sulfate Liquid 300 milliGRAM(s) Enteral Tube daily  glucagon  Injectable 1 milliGRAM(s) IntraMuscular once  heparin  Infusion 1050 Unit(s)/Hr IV Continuous <Continuous>  insulin lispro (ADMELOG) corrective regimen sliding scale   SubCutaneous every 6 hours  insulin NPH human recombinant 3 Unit(s) SubCutaneous every 6 hours  midodrine. 10 milliGRAM(s) Oral three times a day  mupirocin 2% Ointment 1 Application(s) Topical two times a day  pantoprazole  Injectable 40 milliGRAM(s) IV Push two times a day  psyllium Powder 1 Packet(s) Oral daily  sodium chloride 2 Gram(s) Oral two times a day      Vital Signs Last 24 Hrs  T(C): 37.4 (22 Sep 2023 09:21), Max: 37.7 (21 Sep 2023 17:06)  T(F): 99.3 (22 Sep 2023 09:21), Max: 99.8 (21 Sep 2023 17:06)  HR: 103 (22 Sep 2023 15:31) (78 - 110)  BP: 93/33 (22 Sep 2023 09:21) (89/39 - 112/43)  BP(mean): --  RR: 17 (22 Sep 2023 09:21) (17 - 17)  SpO2: 95% (22 Sep 2023 15:31) (95% - 99%)    Parameters below as of 22 Sep 2023 15:31  Patient On (Oxygen Delivery Method): ventilator        Physical Exam:  General:    trached   ENT: L ear with crusted dark drainage  Respiratory:   trach on vent  abd:   soft, not tender  :     no CVAT, no suprapubic tenderness, no willard  Musculoskeletal : no joint swelling  Skin:    erythematous patchy rash on abd, chest,  Ue  vascular: RIKYRA shifabi removed                          9.0    5.40  )-----------( 190      ( 22 Sep 2023 07:40 )             27.6       09-22    126<L>  |  94<L>  |  97<H>  ----------------------------<  210<H>  4.7   |  24  |  2.52<H>    Ca    8.8      22 Sep 2023 07:40  Phos  3.7     09-22  Mg     2.40     09-22    TPro  5.7<L>  /  Alb  1.8<L>  /  TBili  0.3  /  DBili  x   /  AST  33<H>  /  ALT  11  /  AlkPhos  342<H>  09-22      Urinalysis Basic - ( 22 Sep 2023 07:40 )    Color: x / Appearance: x / SG: x / pH: x  Gluc: 210 mg/dL / Ketone: x  / Bili: x / Urobili: x   Blood: x / Protein: x / Nitrite: x   Leuk Esterase: x / RBC: x / WBC x   Sq Epi: x / Non Sq Epi: x / Bacteria: x        MICROBIOLOGY:  Vancomycin Level, Random: 12.6 ug/mL (09-22-23 @ 07:40)  v  .Blood Blood-Venous  09-20-23   No growth at 24 hours  --  --      .Blood Blood-Peripheral  09-20-23   No growth at 48 Hours  --  --      Ear Ear  09-13-23   Moderate Candida albicans "Susceptibilities not performed"  --  --      .Blood Blood-Venous  09-10-23   No growth at 5 days  --  --      .Sputum Sputum  09-10-23   Normal Respiratory Cate present  --    Rare polymorphonuclear leukocytes per low power field  No Squamous epithelial cells per low power field  Rare Yeast like cells seen per oil power field  Rare Gram Positive Cocci in Clusters seen per oil power field      .Blood Blood-Peripheral  09-10-23   No growth at 5 days  --  --      .Sputum Sputum  09-08-23   Normal Respiratory Cate present  --    Numerous polymorphonuclear leukocytes per low power field  Numerous Squamous epithelial cells per low power field  Few Yeast like cells per oil power field  Results consistent with oropharyngeal contamination      .Blood Blood-Peripheral  09-08-23   No growth at 5 days  --  --      Ear Ear  09-06-23   No growth at 5 days  --  --      .Blood Blood-Peripheral  09-04-23   No growth at 5 days  --  --      .Bronchial Bronchial Lavage  09-01-23   Numerous Staphylococcus aureus Multiple Morphological Strains  Normal Respiratory Cate present  --  Staphylococcus aureus  Staphylococcus aureus      .Blood Blood-Peripheral  09-01-23   Growth in aerobic and anaerobic bottles: Staphylococcus aureus  Direct identification is available within approximately 3-5  hours either by Blood Panel Multiplexed PCR or Direct  MALDI-TOF. Details: [email or web address]  Growth in anaerobic bottle: blood culture bottle turned positive after  the final report was released.  --  Blood Culture PCR  Staphylococcus aureus      ET Tube ET Tube  09-01-23   Moderate Staphylococcus aureus  Moderate Escherichia coli ESBL  Normal Respiratory Cate present  --  Staphylococcus aureus  Escherichia coli ESBL          Rapid RVP Result: NotDetec (09-20 @ 15:35)        RADIOLOGY:  Images independently visualized and reviewed personally, findings as below  < from: Xray Chest 1 View- PORTABLE-Urgent (Xray Chest 1 View- PORTABLE-Urgent .) (09.20.23 @ 19:42) >  FINDINGS:  Right IJ hemodialysis catheter has its tip in the SVC in satisfactory   position.    Tracheostomy and enteric tubes in addition to the loop recorder are   unchanged.    Bilateral layering effusions with perihilar interstitial opacities likely   edema is present.    No cardiomegaly.    No pneumomediastinum or pneumothorax.    < end of copied text >  < from: CT Internal Auditory Canals No Cont (09.12.23 @ 17:13) >  IMPRESSION:    1. Acute on chronic left-sided otitis externa and acute on chronic   left-sided otomastoiditis. 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 Superimposed cholesteatoma formation within the left   tympanomastoid cavity cannot be excluded. No evidence of malignant otitis   externa.    2. Chronic right-sided mastoiditis.      < end of copied text >

## 2023-09-22 NOTE — PROGRESS NOTE ADULT - SUBJECTIVE AND OBJECTIVE BOX
ENT ISSUE/POD: Left otitis externa and otomastoiditis    HPI: Patient was seen and examined at bedside. No acute complaints per  at bedside. Patient is on full ventilator support. Unable to answer any question.    PAST MEDICAL & SURGICAL HISTORY:  Breast CA, Diabetes, Stroke, Cardiac arrest, HTN (hypertension)  H/O mastectomy, bilateral    Allergies:  isoniazid (Rash)  nafcillin (Unknown)  hydrALAZINE (Rash)  vitamin E (Short breath; Urticaria; Hives)  doxycycline (Rash)  cefepime (Rash)  NIFEdipine (Urticaria; Hives)    Intolerances      MEDICATIONS  (STANDING):  acetic acid 0.25% Topical Irrigation 1 Application(s) Topical two times a day  albuterol/ipratropium for Nebulization 3 milliLiter(s) Nebulizer every 6 hours  atorvastatin 40 milliGRAM(s) Oral at bedtime  buMETAnide Injectable 2 milliGRAM(s) IV Push two times a day  chlorhexidine 0.12% Liquid 15 milliLiter(s) Oral Mucosa every 12 hours  chlorhexidine 2% Cloths 1 Application(s) Topical daily  dextrose 5%. 1000 milliLiter(s) (100 mL/Hr) IV Continuous <Continuous>  dextrose 5%. 1000 milliLiter(s) (50 mL/Hr) IV Continuous <Continuous>  dextrose 50% Injectable 25 Gram(s) IV Push once  dextrose 50% Injectable 25 Gram(s) IV Push once  dextrose 50% Injectable 12.5 Gram(s) IV Push once  doxazosin 6 milliGRAM(s) Oral <User Schedule>  epoetin odalis (EPOGEN) Injectable 64619 Unit(s) SubCutaneous <User Schedule>  ferrous sulfate Liquid 300 milliGRAM(s) Enteral Tube daily  fluconAZOLE IVPB 200 milliGRAM(s) IV Intermittent every 24 hours  glucagon  Injectable 1 milliGRAM(s) IntraMuscular once  heparin  Infusion 1050 Unit(s)/Hr (10.5 mL/Hr) IV Continuous <Continuous>  insulin lispro (ADMELOG) corrective regimen sliding scale   SubCutaneous every 6 hours  insulin NPH human recombinant 3 Unit(s) SubCutaneous every 6 hours  meropenem  IVPB 500 milliGRAM(s) IV Intermittent every 12 hours  midodrine. 10 milliGRAM(s) Oral three times a day  mupirocin 2% Ointment 1 Application(s) Topical two times a day  pantoprazole  Injectable 40 milliGRAM(s) IV Push two times a day  psyllium Powder 1 Packet(s) Oral daily  sodium chloride 2 Gram(s) Oral two times a day  vancomycin  IVPB 750 milliGRAM(s) IV Intermittent once    MEDICATIONS  (PRN):  acetaminophen   Oral Liquid .. 650 milliGRAM(s) Oral every 6 hours PRN Temp greater or equal to 38C (100.4F), Mild Pain (1 - 3)  calamine/zinc oxide Lotion 1 Application(s) Topical two times a day PRN Rash and/or Itching  dextrose Oral Gel 15 Gram(s) Oral once PRN Blood Glucose LESS THAN 70 milliGRAM(s)/deciliter    Social History: see consult note    Family history: see consult note    ROS: unable to answer    Vital Signs Last 24 Hrs  T(C): 37.4 (22 Sep 2023 09:21), Max: 37.7 (21 Sep 2023 11:45)  T(F): 99.3 (22 Sep 2023 09:21), Max: 99.9 (21 Sep 2023 11:45)  HR: 104 (22 Sep 2023 10:06) (78 - 110)  BP: 93/33 (22 Sep 2023 09:21) (89/39 - 112/43)  BP(mean): --  RR: 17 (22 Sep 2023 09:21) (17 - 17)  SpO2: 96% (22 Sep 2023 10:06) (95% - 99%)    Parameters below as of 22 Sep 2023 10:06  Patient On (Oxygen Delivery Method): ventilator                        9.0    5.40  )-----------( 190      ( 22 Sep 2023 07:40 )             27.6    09-22    126<L>  |  94<L>  |  97<H>  ----------------------------<  210<H>  4.7   |  24  |  2.52<H>    Ca    8.8      22 Sep 2023 07:40  Phos  3.7     09-22  Mg     2.40     09-22    TPro  5.7<L>  /  Alb  1.8<L>  /  TBili  0.3  /  DBili  x   /  AST  33<H>  /  ALT  11  /  AlkPhos  342<H>  09-22  PTT - ( 21 Sep 2023 16:15 )  PTT:50.5 sec    PHYSICAL EXAM:  Gen: NAD  Skin: No rashes, bruises, or lesions  Head: Normocephalic, Atraumatic  Face: no edema, erythema, or fluctuance. Parotid glands soft without mass  Eyes: no scleral injection  Ears: Right - ear canal clear, TM intact without effusion or erythema. No evidence of any fluid drainage. No mastoid tenderness, erythema, or ear bulging            Left - ear canal clear, TM intact without effusion or erythema. No evidence of any fluid drainage. No mastoid tenderness, erythema, or ear bulging  Nose: Nares bilaterally patent, no discharge  Mouth: No Stridor / Drooling / Trismus.  Mucosa moist, tongue/uvula midline, oropharynx clear  Neck: Flat, supple, no lymphadenopathy, trachea midline, no masses  Lymphatic: No lymphadenopathy  Resp: breathing easily, no stridor  Neuro: facial nerve intact, no facial droop ENT ISSUE/POD: Left otitis externa and otomastoiditis    HPI: Patient was seen and examined at bedside. No acute complaints per  at bedside. Patient is on full ventilator support. Unable to answer any questions.    PAST MEDICAL & SURGICAL HISTORY:  Breast CA, Diabetes, Stroke, Cardiac arrest, HTN (hypertension)  H/O mastectomy, bilateral    Allergies:  isoniazid (Rash)  nafcillin (Unknown)  hydrALAZINE (Rash)  vitamin E (Short breath; Urticaria; Hives)  doxycycline (Rash)  cefepime (Rash)  NIFEdipine (Urticaria; Hives)    Intolerances      MEDICATIONS  (STANDING):  acetic acid 0.25% Topical Irrigation 1 Application(s) Topical two times a day  albuterol/ipratropium for Nebulization 3 milliLiter(s) Nebulizer every 6 hours  atorvastatin 40 milliGRAM(s) Oral at bedtime  buMETAnide Injectable 2 milliGRAM(s) IV Push two times a day  chlorhexidine 0.12% Liquid 15 milliLiter(s) Oral Mucosa every 12 hours  chlorhexidine 2% Cloths 1 Application(s) Topical daily  dextrose 5%. 1000 milliLiter(s) (100 mL/Hr) IV Continuous <Continuous>  dextrose 5%. 1000 milliLiter(s) (50 mL/Hr) IV Continuous <Continuous>  dextrose 50% Injectable 25 Gram(s) IV Push once  dextrose 50% Injectable 25 Gram(s) IV Push once  dextrose 50% Injectable 12.5 Gram(s) IV Push once  doxazosin 6 milliGRAM(s) Oral <User Schedule>  epoetin odalis (EPOGEN) Injectable 09778 Unit(s) SubCutaneous <User Schedule>  ferrous sulfate Liquid 300 milliGRAM(s) Enteral Tube daily  fluconAZOLE IVPB 200 milliGRAM(s) IV Intermittent every 24 hours  glucagon  Injectable 1 milliGRAM(s) IntraMuscular once  heparin  Infusion 1050 Unit(s)/Hr (10.5 mL/Hr) IV Continuous <Continuous>  insulin lispro (ADMELOG) corrective regimen sliding scale   SubCutaneous every 6 hours  insulin NPH human recombinant 3 Unit(s) SubCutaneous every 6 hours  meropenem  IVPB 500 milliGRAM(s) IV Intermittent every 12 hours  midodrine. 10 milliGRAM(s) Oral three times a day  mupirocin 2% Ointment 1 Application(s) Topical two times a day  pantoprazole  Injectable 40 milliGRAM(s) IV Push two times a day  psyllium Powder 1 Packet(s) Oral daily  sodium chloride 2 Gram(s) Oral two times a day  vancomycin  IVPB 750 milliGRAM(s) IV Intermittent once    MEDICATIONS  (PRN):  acetaminophen   Oral Liquid .. 650 milliGRAM(s) Oral every 6 hours PRN Temp greater or equal to 38C (100.4F), Mild Pain (1 - 3)  calamine/zinc oxide Lotion 1 Application(s) Topical two times a day PRN Rash and/or Itching  dextrose Oral Gel 15 Gram(s) Oral once PRN Blood Glucose LESS THAN 70 milliGRAM(s)/deciliter    Social History: see consult note    Family history: see consult note    ROS: unable to answer    Vital Signs Last 24 Hrs  T(C): 37.4 (22 Sep 2023 09:21), Max: 37.7 (21 Sep 2023 11:45)  T(F): 99.3 (22 Sep 2023 09:21), Max: 99.9 (21 Sep 2023 11:45)  HR: 104 (22 Sep 2023 10:06) (78 - 110)  BP: 93/33 (22 Sep 2023 09:21) (89/39 - 112/43)  BP(mean): --  RR: 17 (22 Sep 2023 09:21) (17 - 17)  SpO2: 96% (22 Sep 2023 10:06) (95% - 99%)    Parameters below as of 22 Sep 2023 10:06  Patient On (Oxygen Delivery Method): ventilator                        9.0    5.40  )-----------( 190      ( 22 Sep 2023 07:40 )             27.6    09-22    126<L>  |  94<L>  |  97<H>  ----------------------------<  210<H>  4.7   |  24  |  2.52<H>    Ca    8.8      22 Sep 2023 07:40  Phos  3.7     09-22  Mg     2.40     09-22    TPro  5.7<L>  /  Alb  1.8<L>  /  TBili  0.3  /  DBili  x   /  AST  33<H>  /  ALT  11  /  AlkPhos  342<H>  09-22  PTT - ( 21 Sep 2023 16:15 )  PTT:50.5 sec    PHYSICAL EXAM:  Gen: NAD  Skin: No rashes, bruises, or lesions  Head: Normocephalic, Atraumatic  Face: no edema, erythema, or fluctuance. Parotid glands soft without mass  Eyes: no scleral injection  Ears: Right - ear canal clear, TM intact without effusion or erythema. No evidence of any fluid drainage. No mastoid tenderness, erythema, or ear bulging            Left - ear canal clear, TM intact without effusion or erythema. No evidence of any fluid drainage. No mastoid tenderness, erythema, or ear bulging  Nose: Nares bilaterally patent, no discharge  Mouth: No Stridor / Drooling / Trismus.  Mucosa moist, tongue/uvula midline, oropharynx clear  Neck: Flat, supple, no lymphadenopathy, trachea midline, no masses  Lymphatic: No lymphadenopathy  Resp: breathing easily, no stridor  Neuro: facial nerve intact, no facial droop ENT ISSUE/POD: Left otitis externa and otomastoiditis    HPI: Patient was seen and examined at bedside. No acute complaints per  at bedside. Patient is on full ventilator support. Unable to answer any questions.    PAST MEDICAL & SURGICAL HISTORY:  Breast CA, Diabetes, Stroke, Cardiac arrest, HTN (hypertension)  H/O mastectomy, bilateral    Allergies:  isoniazid (Rash)  nafcillin (Unknown)  hydrALAZINE (Rash)  vitamin E (Short breath; Urticaria; Hives)  doxycycline (Rash)  cefepime (Rash)  NIFEdipine (Urticaria; Hives)    Intolerances      MEDICATIONS  (STANDING):  acetic acid 0.25% Topical Irrigation 1 Application(s) Topical two times a day  albuterol/ipratropium for Nebulization 3 milliLiter(s) Nebulizer every 6 hours  atorvastatin 40 milliGRAM(s) Oral at bedtime  buMETAnide Injectable 2 milliGRAM(s) IV Push two times a day  chlorhexidine 0.12% Liquid 15 milliLiter(s) Oral Mucosa every 12 hours  chlorhexidine 2% Cloths 1 Application(s) Topical daily  dextrose 5%. 1000 milliLiter(s) (100 mL/Hr) IV Continuous <Continuous>  dextrose 5%. 1000 milliLiter(s) (50 mL/Hr) IV Continuous <Continuous>  dextrose 50% Injectable 25 Gram(s) IV Push once  dextrose 50% Injectable 25 Gram(s) IV Push once  dextrose 50% Injectable 12.5 Gram(s) IV Push once  doxazosin 6 milliGRAM(s) Oral <User Schedule>  epoetin odalis (EPOGEN) Injectable 69602 Unit(s) SubCutaneous <User Schedule>  ferrous sulfate Liquid 300 milliGRAM(s) Enteral Tube daily  fluconAZOLE IVPB 200 milliGRAM(s) IV Intermittent every 24 hours  glucagon  Injectable 1 milliGRAM(s) IntraMuscular once  heparin  Infusion 1050 Unit(s)/Hr (10.5 mL/Hr) IV Continuous <Continuous>  insulin lispro (ADMELOG) corrective regimen sliding scale   SubCutaneous every 6 hours  insulin NPH human recombinant 3 Unit(s) SubCutaneous every 6 hours  meropenem  IVPB 500 milliGRAM(s) IV Intermittent every 12 hours  midodrine. 10 milliGRAM(s) Oral three times a day  mupirocin 2% Ointment 1 Application(s) Topical two times a day  pantoprazole  Injectable 40 milliGRAM(s) IV Push two times a day  psyllium Powder 1 Packet(s) Oral daily  sodium chloride 2 Gram(s) Oral two times a day  vancomycin  IVPB 750 milliGRAM(s) IV Intermittent once    MEDICATIONS  (PRN):  acetaminophen   Oral Liquid .. 650 milliGRAM(s) Oral every 6 hours PRN Temp greater or equal to 38C (100.4F), Mild Pain (1 - 3)  calamine/zinc oxide Lotion 1 Application(s) Topical two times a day PRN Rash and/or Itching  dextrose Oral Gel 15 Gram(s) Oral once PRN Blood Glucose LESS THAN 70 milliGRAM(s)/deciliter    Social History: see consult note    Family history: see consult note    ROS: unable to answer    Vital Signs Last 24 Hrs  T(C): 37.4 (22 Sep 2023 09:21), Max: 37.7 (21 Sep 2023 11:45)  T(F): 99.3 (22 Sep 2023 09:21), Max: 99.9 (21 Sep 2023 11:45)  HR: 104 (22 Sep 2023 10:06) (78 - 110)  BP: 93/33 (22 Sep 2023 09:21) (89/39 - 112/43)  BP(mean): --  RR: 17 (22 Sep 2023 09:21) (17 - 17)  SpO2: 96% (22 Sep 2023 10:06) (95% - 99%)    Parameters below as of 22 Sep 2023 10:06  Patient On (Oxygen Delivery Method): ventilator                        9.0    5.40  )-----------( 190      ( 22 Sep 2023 07:40 )             27.6    09-22    126<L>  |  94<L>  |  97<H>  ----------------------------<  210<H>  4.7   |  24  |  2.52<H>    Ca    8.8      22 Sep 2023 07:40  Phos  3.7     09-22  Mg     2.40     09-22    TPro  5.7<L>  /  Alb  1.8<L>  /  TBili  0.3  /  DBili  x   /  AST  33<H>  /  ALT  11  /  AlkPhos  342<H>  09-22  PTT - ( 21 Sep 2023 16:15 )  PTT:50.5 sec    PHYSICAL EXAM:  Gen: NAD  Head: Normocephalic, Atraumatic  Face: no edema, erythema, or fluctuance.   Ears: Left -   Nose: Nares bilaterally patent, no discharge  Mouth: No Stridor  Neck: + tracheostomy tube in the neck secured with posey. Flat, supple, no lymphadenopathy, trachea midline, no masses  Lymphatic: No lymphadenopathy  Resp: on vent   ENT ISSUE/POD: Left otitis externa and otomastoiditis    HPI: Patient was seen and examined at bedside. No acute complaints per  at bedside. Patient is on full ventilator support. Unable to answer any questions.    PAST MEDICAL & SURGICAL HISTORY:  Breast CA, Diabetes, Stroke, Cardiac arrest, HTN (hypertension)  H/O mastectomy, bilateral    Allergies:  isoniazid (Rash)  nafcillin (Unknown)  hydrALAZINE (Rash)  vitamin E (Short breath; Urticaria; Hives)  doxycycline (Rash)  cefepime (Rash)  NIFEdipine (Urticaria; Hives)    Intolerances      MEDICATIONS  (STANDING):  acetic acid 0.25% Topical Irrigation 1 Application(s) Topical two times a day  albuterol/ipratropium for Nebulization 3 milliLiter(s) Nebulizer every 6 hours  atorvastatin 40 milliGRAM(s) Oral at bedtime  buMETAnide Injectable 2 milliGRAM(s) IV Push two times a day  chlorhexidine 0.12% Liquid 15 milliLiter(s) Oral Mucosa every 12 hours  chlorhexidine 2% Cloths 1 Application(s) Topical daily  dextrose 5%. 1000 milliLiter(s) (100 mL/Hr) IV Continuous <Continuous>  dextrose 5%. 1000 milliLiter(s) (50 mL/Hr) IV Continuous <Continuous>  dextrose 50% Injectable 25 Gram(s) IV Push once  dextrose 50% Injectable 25 Gram(s) IV Push once  dextrose 50% Injectable 12.5 Gram(s) IV Push once  doxazosin 6 milliGRAM(s) Oral <User Schedule>  epoetin odalis (EPOGEN) Injectable 05657 Unit(s) SubCutaneous <User Schedule>  ferrous sulfate Liquid 300 milliGRAM(s) Enteral Tube daily  fluconAZOLE IVPB 200 milliGRAM(s) IV Intermittent every 24 hours  glucagon  Injectable 1 milliGRAM(s) IntraMuscular once  heparin  Infusion 1050 Unit(s)/Hr (10.5 mL/Hr) IV Continuous <Continuous>  insulin lispro (ADMELOG) corrective regimen sliding scale   SubCutaneous every 6 hours  insulin NPH human recombinant 3 Unit(s) SubCutaneous every 6 hours  meropenem  IVPB 500 milliGRAM(s) IV Intermittent every 12 hours  midodrine. 10 milliGRAM(s) Oral three times a day  mupirocin 2% Ointment 1 Application(s) Topical two times a day  pantoprazole  Injectable 40 milliGRAM(s) IV Push two times a day  psyllium Powder 1 Packet(s) Oral daily  sodium chloride 2 Gram(s) Oral two times a day  vancomycin  IVPB 750 milliGRAM(s) IV Intermittent once    MEDICATIONS  (PRN):  acetaminophen   Oral Liquid .. 650 milliGRAM(s) Oral every 6 hours PRN Temp greater or equal to 38C (100.4F), Mild Pain (1 - 3)  calamine/zinc oxide Lotion 1 Application(s) Topical two times a day PRN Rash and/or Itching  dextrose Oral Gel 15 Gram(s) Oral once PRN Blood Glucose LESS THAN 70 milliGRAM(s)/deciliter    Social History: see consult note    Family history: see consult note    ROS: unable to answer    Vital Signs Last 24 Hrs  T(C): 37.4 (22 Sep 2023 09:21), Max: 37.7 (21 Sep 2023 11:45)  T(F): 99.3 (22 Sep 2023 09:21), Max: 99.9 (21 Sep 2023 11:45)  HR: 104 (22 Sep 2023 10:06) (78 - 110)  BP: 93/33 (22 Sep 2023 09:21) (89/39 - 112/43)  BP(mean): --  RR: 17 (22 Sep 2023 09:21) (17 - 17)  SpO2: 96% (22 Sep 2023 10:06) (95% - 99%)    Parameters below as of 22 Sep 2023 10:06  Patient On (Oxygen Delivery Method): ventilator                        9.0    5.40  )-----------( 190      ( 22 Sep 2023 07:40 )             27.6    09-22    126<L>  |  94<L>  |  97<H>  ----------------------------<  210<H>  4.7   |  24  |  2.52<H>    Ca    8.8      22 Sep 2023 07:40  Phos  3.7     09-22  Mg     2.40     09-22    TPro  5.7<L>  /  Alb  1.8<L>  /  TBili  0.3  /  DBili  x   /  AST  33<H>  /  ALT  11  /  AlkPhos  342<H>  09-22  PTT - ( 21 Sep 2023 16:15 )  PTT:50.5 sec    PHYSICAL EXAM:  Gen: NAD  Head: Normocephalic, Atraumatic  Face: no edema, erythema, or fluctuance.   Ears: Left - black spores and whitish spots seen in external ear canal. conchal bowl with some discoloration. unable to fully examine tympanomembran with bedside debridment wiht suctioned at beside.  Nose: Nares bilaterally patent, no discharge  Mouth: No Stridor  Neck: + tracheostomy tube in the neck secured with posey. Flat, supple, no lymphadenopathy, trachea midline, no masses  Lymphatic: No lymphadenopathy  Resp: on vent   ENT ISSUE/POD: Left otitis externa and otomastoiditis    HPI: Patient was seen and examined at bedside. No acute complaints per  at bedside. Patient is on full ventilator support. Unable to answer any questions.    PAST MEDICAL & SURGICAL HISTORY:  Breast CA, Diabetes, Stroke, Cardiac arrest, HTN (hypertension)  H/O mastectomy, bilateral    Allergies:  isoniazid (Rash)  nafcillin (Unknown)  hydrALAZINE (Rash)  vitamin E (Short breath; Urticaria; Hives)  doxycycline (Rash)  cefepime (Rash)  NIFEdipine (Urticaria; Hives)    Intolerances      MEDICATIONS  (STANDING):  acetic acid 0.25% Topical Irrigation 1 Application(s) Topical two times a day  albuterol/ipratropium for Nebulization 3 milliLiter(s) Nebulizer every 6 hours  atorvastatin 40 milliGRAM(s) Oral at bedtime  buMETAnide Injectable 2 milliGRAM(s) IV Push two times a day  chlorhexidine 0.12% Liquid 15 milliLiter(s) Oral Mucosa every 12 hours  chlorhexidine 2% Cloths 1 Application(s) Topical daily  dextrose 5%. 1000 milliLiter(s) (100 mL/Hr) IV Continuous <Continuous>  dextrose 5%. 1000 milliLiter(s) (50 mL/Hr) IV Continuous <Continuous>  dextrose 50% Injectable 25 Gram(s) IV Push once  dextrose 50% Injectable 25 Gram(s) IV Push once  dextrose 50% Injectable 12.5 Gram(s) IV Push once  doxazosin 6 milliGRAM(s) Oral <User Schedule>  epoetin odalis (EPOGEN) Injectable 01480 Unit(s) SubCutaneous <User Schedule>  ferrous sulfate Liquid 300 milliGRAM(s) Enteral Tube daily  fluconAZOLE IVPB 200 milliGRAM(s) IV Intermittent every 24 hours  glucagon  Injectable 1 milliGRAM(s) IntraMuscular once  heparin  Infusion 1050 Unit(s)/Hr (10.5 mL/Hr) IV Continuous <Continuous>  insulin lispro (ADMELOG) corrective regimen sliding scale   SubCutaneous every 6 hours  insulin NPH human recombinant 3 Unit(s) SubCutaneous every 6 hours  meropenem  IVPB 500 milliGRAM(s) IV Intermittent every 12 hours  midodrine. 10 milliGRAM(s) Oral three times a day  mupirocin 2% Ointment 1 Application(s) Topical two times a day  pantoprazole  Injectable 40 milliGRAM(s) IV Push two times a day  psyllium Powder 1 Packet(s) Oral daily  sodium chloride 2 Gram(s) Oral two times a day  vancomycin  IVPB 750 milliGRAM(s) IV Intermittent once    MEDICATIONS  (PRN):  acetaminophen   Oral Liquid .. 650 milliGRAM(s) Oral every 6 hours PRN Temp greater or equal to 38C (100.4F), Mild Pain (1 - 3)  calamine/zinc oxide Lotion 1 Application(s) Topical two times a day PRN Rash and/or Itching  dextrose Oral Gel 15 Gram(s) Oral once PRN Blood Glucose LESS THAN 70 milliGRAM(s)/deciliter    Social History: see consult note    Family history: see consult note    ROS: unable to answer    Vital Signs Last 24 Hrs  T(C): 37.4 (22 Sep 2023 09:21), Max: 37.7 (21 Sep 2023 11:45)  T(F): 99.3 (22 Sep 2023 09:21), Max: 99.9 (21 Sep 2023 11:45)  HR: 104 (22 Sep 2023 10:06) (78 - 110)  BP: 93/33 (22 Sep 2023 09:21) (89/39 - 112/43)  BP(mean): --  RR: 17 (22 Sep 2023 09:21) (17 - 17)  SpO2: 96% (22 Sep 2023 10:06) (95% - 99%)    Parameters below as of 22 Sep 2023 10:06  Patient On (Oxygen Delivery Method): ventilator                        9.0    5.40  )-----------( 190      ( 22 Sep 2023 07:40 )             27.6    09-22    126<L>  |  94<L>  |  97<H>  ----------------------------<  210<H>  4.7   |  24  |  2.52<H>    Ca    8.8      22 Sep 2023 07:40  Phos  3.7     09-22  Mg     2.40     09-22    TPro  5.7<L>  /  Alb  1.8<L>  /  TBili  0.3  /  DBili  x   /  AST  33<H>  /  ALT  11  /  AlkPhos  342<H>  09-22  PTT - ( 21 Sep 2023 16:15 )  PTT:50.5 sec    PHYSICAL EXAM:  Gen: NAD  Head: Normocephalic, Atraumatic  Face: no edema, erythema, or fluctuance.   Ears: Left - Black spores and whitish spots seen in external ear canal. Conchal bowl with some discoloration. Unable to fully examine tympanic membrane due to debris. Debridement suctioned at bedside.  Nose: Nares bilaterally patent, no discharge  Mouth: No Stridor  Neck: + tracheostomy tube in the neck secured with posey. Flat, supple, no lymphadenopathy, trachea midline, no masses  Lymphatic: No lymphadenopathy  Resp: on vent

## 2023-09-22 NOTE — PROGRESS NOTE ADULT - ASSESSMENT
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 ?uremia of late; we attempted HD the other day as well as the day prior from the shiley that has been in place for the past few weeks; the catheter was not able to be used successfully.     (3)Hyponatremia - low but slowly improving/stable, on NaCl tabs    (4)Pulm- hypercapnic respiratory failure on admission - now s/p trach; remains on vent     (5)ID - on IV Meropenem    (6)Anemia - receiving DAVID and PRBCs intermittently    (7)AMS - at least at times over the past 2-3 days, she has been mentating well; this argues against uremic encephalopathy    (8)Vasc - the [facility] shiley is no longer functioning. 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 ?uremia of late; we attempted HD the other day as well as the day prior from the shiley that has been in place for the past few weeks; the catheter was not able to be used successfully.     (3)Hyponatremia - low but slowly improving/stable, on NaCl tabs    (4)Pulm- hypercapnic respiratory failure on admission - now s/p trach; remains on vent     (5)ID - on IV Meropenem    (6)Anemia - receiving DAVID and PRBCs intermittently    (7)AMS - at least at times over the past 2-3 days, she has been mentating well; this argues against uremic encephalopathy    (8)Vasc - the [facility] shiley is no longer functioning. If we are to opt for further HD, she would require a new catheter    RECOMMEND:  (1)No HD today, attempting efforts to further forgo HD; if we are to re-perform HD, would plan for new shiley placement  (2)NaCl tabs as ordered    D/w Dr. Isaiah Espinoza, [facility]  [phone]    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 ?uremia of late; we attempted HD the other day as well as the day prior from the shiley that has been in place for the past few weeks; the catheter was not able to be used successfully.     (3)Hyponatremia - low but slowly improving/stable, on NaCl tabs    (4)Pulm- hypercapnic respiratory failure on admission - now s/p trach; remains on vent     (5)ID - on IV Meropenem    (6)Anemia - receiving DAVID and PRBCs intermittently    (7)AMS - at least at times over the past 2-3 days, she has been mentating well; this argues against uremic encephalopathy      RECOMMEND:  (1)No HD today, attempting efforts to further forgo HD; if we are to re-perform HD, would plan for new shiley placement  (2)NaCl tabs as ordered    D/w Dr. Isaiah Espinoza, [facility]  [facility]  [phone]       RENAL ATTENDING NOTE  Patient seen and examined with NP. Agree with assessment and plan as above.  We are getting by without HD for now. Already with febrile illness. Shiley is out now, but placement of new HD catheter would be a potential nidus for further infection.     Jimmy Colon MD  [facility]  [phone]

## 2023-09-22 NOTE — PROGRESS NOTE ADULT - ASSESSMENT
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 family declined renal biopsy started on prednisone  s/p trach 9/1 and BAL with MSSA   ET cx with ESBL and MSSA  started on ana cristina 9/1  blood cx 9/1: MSSA   repeat negative 9/4, 9/8  CXR with b/l opacities, R increased  L ear edema and otitis externa    initial  resp failure for ?fluid ovrer load but also had MSSA in the sputum, got vanco, aztreonam one day then cefepime and had rash, renal failure which was considered due to cefepime and then received 2 days of cefazolin and then off, now with trach and bronc on 9/1 with MSSA bacteremia and BAL also MSSA  another ET cx with MSSA and ESBL E-coli  hypotension, MSSA bacteremia likely due to pneumonia, repeat blood cx negative 9/4, TTE limited unable to exclude endocarditis  L otitis externa on ana cristina since 9/1 but worsening edema and black discoloration with bullae forming and persistent fever (ear cx was negative)  Ct showed acute on chronic otitis externa and otomastoiditis , superimposed cholesteatoma can not be excluded, no malignant otitis externa  pt again febrile, ENT stated there was black spores which could be a fungal infection and the last cx showed candida albicans  bleeding from HD site and ilene removed   new erythematous patchy rash    * pt already had >3 weeks of ana cristina and now with a new rash, will discontinue  * c/w fluconazole 200 qd, started 9/21  * cannot do cefazolin so will have to treat with vanco  * c/w vanco per level  * will need a 4 week course of vanco for the MSSA bacteremia from the negative blood cx through 10/2  * monitor CBC/diff and renal function    The above assessment and plan was discussed with the primary team    Yumiko Conner MD  contact on teams  After 5pm and on weekends call [phone]

## 2023-09-22 NOTE — PROGRESS NOTE ADULT - SUBJECTIVE AND OBJECTIVE BOX
NEPHROLOGY     Patient seen and examined with  at bedside, trach to vent, in no acute distress.     MEDICATIONS  (STANDING):  acetic acid 0.25% Topical Irrigation 1 Application(s) Topical two times a day  albuterol/ipratropium for Nebulization 3 milliLiter(s) Nebulizer every 6 hours  atorvastatin 40 milliGRAM(s) Oral at bedtime  buMETAnide Injectable 2 milliGRAM(s) IV Push two times a day  chlorhexidine 0.12% Liquid 15 milliLiter(s) Oral Mucosa every 12 hours  chlorhexidine 2% Cloths 1 Application(s) Topical daily  dextrose 5%. 1000 milliLiter(s) (50 mL/Hr) IV Continuous <Continuous>  dextrose 5%. 1000 milliLiter(s) (100 mL/Hr) IV Continuous <Continuous>  dextrose 50% Injectable 12.5 Gram(s) IV Push once  dextrose 50% Injectable 25 Gram(s) IV Push once  dextrose 50% Injectable 25 Gram(s) IV Push once  doxazosin 6 milliGRAM(s) Oral <User Schedule>  epoetin odalis (EPOGEN) Injectable 76200 Unit(s) SubCutaneous <User Schedule>  ferrous    sulfate Liquid 300 milliGRAM(s) Enteral Tube daily  fluconAZOLE IVPB 200 milliGRAM(s) IV Intermittent every 24 hours  glucagon  Injectable 1 milliGRAM(s) IntraMuscular once  heparin  Infusion 1050 Unit(s)/Hr (10.5 mL/Hr) IV Continuous <Continuous>  insulin lispro (ADMELOG) corrective regimen sliding scale   SubCutaneous every 6 hours  insulin NPH human recombinant 3 Unit(s) SubCutaneous every 6 hours  meropenem  IVPB 500 milliGRAM(s) IV Intermittent every 12 hours  midodrine. 10 milliGRAM(s) Oral three times a day  mupirocin 2% Ointment 1 Application(s) Topical two times a day  pantoprazole  Injectable 40 milliGRAM(s) IV Push two times a day  psyllium Powder 1 Packet(s) Oral daily  sodium chloride 2 Gram(s) Oral two times a day  vancomycin  IVPB 750 milliGRAM(s) IV Intermittent once    VITALS:  T(C): , Max: 37.7 (09-21-23 @ 11:45)  T(F): , Max: 99.9 (09-21-23 @ 11:45)  HR: 104 (09-22-23 @ 10:06)  BP: 93/33 (09-22-23 @ 09:21)  RR: 17 (09-22-23 @ 09:21)  SpO2: 96% (09-22-23 @ 10:06)    I and O's:    09-21 @ 07:01  -  09-22 @ 07:00  --------------------------------------------------------  IN: 840 mL / OUT: 800 mL / NET: 40 mL    PHYSICAL EXAM:  Constitutional: trach to vent  HEENT: + tracheostomy, + NGT  Respiratory: Coarse BS  Cardiovascular: tachy s1s2  Gastrointestinal: BS+, soft, NT/ND  Extremities: + peripheral edema  Neurological: UTO  : +external catheter   Skin: No rashes    LABS:                        9.0    5.40  )-----------( 190      ( 22 Sep 2023 07:40 )             27.6     09-22    126<L>  |  94<L>  |  97<H>  ----------------------------<  210<H>  4.7   |  24  |  2.52<H>    Ca    8.8      22 Sep 2023 07:40  Phos  3.7     09-22  Mg     2.40     09-22    TPro  5.7<L>  /  Alb  1.8<L>  /  TBili  0.3  /  DBili  x   /  AST  33<H>  /  ALT  11  /  AlkPhos  342<H>  09-22    Urine Studies:  Urinalysis Basic - ( 22 Sep 2023 07:40 )    Color: x / Appearance: x / SG: x / pH: x  Gluc: 210 mg/dL / Ketone: x  / Bili: x / Urobili: x   Blood: x / Protein: x / Nitrite: x   Leuk Esterase: x / RBC: x / WBC x   Sq Epi: x / Non Sq Epi: x / Bacteria: x   NEPHROLOGY     Patient seen and examined with  at bedside, trach to ilene mitchell removed yesterday, in no acute distress.     MEDICATIONS  (STANDING):  acetic acid 0.25% Topical Irrigation 1 Application(s) Topical two times a day  albuterol/ipratropium for Nebulization 3 milliLiter(s) Nebulizer every 6 hours  atorvastatin 40 milliGRAM(s) Oral at bedtime  buMETAnide Injectable 2 milliGRAM(s) IV Push two times a day  chlorhexidine 0.12% Liquid 15 milliLiter(s) Oral Mucosa every 12 hours  chlorhexidine 2% Cloths 1 Application(s) Topical daily  dextrose 5%. 1000 milliLiter(s) (50 mL/Hr) IV Continuous <Continuous>  dextrose 5%. 1000 milliLiter(s) (100 mL/Hr) IV Continuous <Continuous>  dextrose 50% Injectable 12.5 Gram(s) IV Push once  dextrose 50% Injectable 25 Gram(s) IV Push once  dextrose 50% Injectable 25 Gram(s) IV Push once  doxazosin 6 milliGRAM(s) Oral <User Schedule>  epoetin odalis (EPOGEN) Injectable 76985 Unit(s) SubCutaneous <User Schedule>  ferrous    sulfate Liquid 300 milliGRAM(s) Enteral Tube daily  fluconAZOLE IVPB 200 milliGRAM(s) IV Intermittent every 24 hours  glucagon  Injectable 1 milliGRAM(s) IntraMuscular once  heparin  Infusion 1050 Unit(s)/Hr (10.5 mL/Hr) IV Continuous <Continuous>  insulin lispro (ADMELOG) corrective regimen sliding scale   SubCutaneous every 6 hours  insulin NPH human recombinant 3 Unit(s) SubCutaneous every 6 hours  meropenem  IVPB 500 milliGRAM(s) IV Intermittent every 12 hours  midodrine. 10 milliGRAM(s) Oral three times a day  mupirocin 2% Ointment 1 Application(s) Topical two times a day  pantoprazole  Injectable 40 milliGRAM(s) IV Push two times a day  psyllium Powder 1 Packet(s) Oral daily  sodium chloride 2 Gram(s) Oral two times a day  vancomycin  IVPB 750 milliGRAM(s) IV Intermittent once    VITALS:  T(C): , Max: 37.7 (09-21-23 @ 11:45)  T(F): , Max: 99.9 (09-21-23 @ 11:45)  HR: 104 (09-22-23 @ 10:06)  BP: 93/33 (09-22-23 @ 09:21)  RR: 17 (09-22-23 @ 09:21)  SpO2: 96% (09-22-23 @ 10:06)    I and O's:    09-21 @ 07:01  -  09-22 @ 07:00  --------------------------------------------------------  IN: 840 mL / OUT: 800 mL / NET: 40 mL    PHYSICAL EXAM:  Constitutional: trach to vent  HEENT: + tracheostomy, + NGT  Respiratory: Coarse BS  Cardiovascular: tachy s1s2  Gastrointestinal: BS+, soft, NT/ND  Extremities: + peripheral edema  Neurological: UTO  : +external catheter   Skin: No rashes    LABS:                        9.0    5.40  )-----------( 190      ( 22 Sep 2023 07:40 )             27.6     09-22    126<L>  |  94<L>  |  97<H>  ----------------------------<  210<H>  4.7   |  24  |  2.52<H>    Ca    8.8      22 Sep 2023 07:40  Phos  3.7     09-22  Mg     2.40     09-22    TPro  5.7<L>  /  Alb  1.8<L>  /  TBili  0.3  /  DBili  x   /  AST  33<H>  /  ALT  11  /  AlkPhos  342<H>  09-22    Urine Studies:  Urinalysis Basic - ( 22 Sep 2023 07:40 )    Color: x / Appearance: x / SG: x / pH: x  Gluc: 210 mg/dL / Ketone: x  / Bili: x / Urobili: x   Blood: x / Protein: x / Nitrite: x   Leuk Esterase: x / RBC: x / WBC x   Sq Epi: x / Non Sq Epi: x / Bacteria: x

## 2023-09-22 NOTE — ADVANCED PRACTICE NURSE CONSULT - REASON FOR CONSULT
Patient seen after consult placed for re-evaluation of left ear. Chart reviewed since last assessment: Patient was started on heparin drip for acute left lower popliteal DVT on 9/11/2023 by primary team. 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 Superimposed left-sided tympanosclerosis. Superimposed cholesteatoma formation within the left tympanomastoid cavity cannot be excluded. No evidence of malignant otitis externa.  + MRSA on mupirocin. ENT saw on 9/20 for white purulent discharge from left ear and acetic acid and mupirocin were given. Patient is currently on meropenem and vancomycin per ID. Fluconazole was added as well on 9/21. ENT still following.

## 2023-09-22 NOTE — PROGRESS NOTE ADULT - PROBLEM SELECTOR PLAN 1
- Improved, no further debridement needed at this time  - Please keep pressure off from left ear at all times  - Continue with broad spectrum antibiotics  - No plan for any surgical intervention from ENT at this time, ENT will sign off  - ID recommendation appreciated for duration and length of antibiotics  - Recommend wound care for discoloration on conchal bowl  - Please follow up with Dr. Jung blackwood

## 2023-09-22 NOTE — PROGRESS NOTE ADULT - ASSESSMENT
74 y/o F well known to me from my housecal outptn practice. she was admitted at [facility] 7/12-7/22 w aspiration PNA, was treated w CEFEPIME, developed an allergic rash,  dCHF, + MAC on AFB culture, had been progressively getting more and more lethargic and dyspneic at home since DC.   In  am of 8/11/23  ptn presented with respiratory distress w hypoxia and hypercarbia requiring intubation 2/2 volume overload +/- Asp PNA      Neuro   responds appropriately   Baseline MS AOx3, aphasic   - h/o CVA , on aspirin and statin . resumed w feeding tube, ASA resumed 8/26  - eeg  2/2 tremors, no sz focus  - less responsive in the past few days  - MRI 8/17:  new R cerebellar infarct, old left PCA/Occipital infarct. 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 Mcnulty, now in RCU  s/p septic shock overnight 9/1, now on meropenem, HDS  s/p trache, on vent support, copious secretions      Renal   Hyperkalemia with EKG changes  , initially treated w LOKELMA,  now resolved   Ptn well know to Dr. Colon, consult reviewed    HD 8/19,  8/21, 8/26 and 8/28.  s/p PUF 8/29 2.5 Liters, HD 8/30,  9/1, 9/4  started chronic HD 9/7,   cardura resumed  on diuretics  hyponatremic  ptn is less responsive, its possible she is uremic, unable to complete HD 9/19 2/2 bleeding at [facility],   [facility] shiley was removed 9/20, if need HD will need another shiley placed as per renal. On IV Fluconazole for fungal cx+ from O. externa DC        GI  NPO  on tube feeds  coffee ground emesis x 1 8/24, melena overnight 8/31, s/p 1UPRBC, EGD/Colonoscopy: erosive gastropathy, esophagisits, on colonoscopy old blood seen no active bleeding, poor prep  family to decide re PEG placement    Endocrine  T2DM   A1C 7.1 in 7/2023   - BG goal 140-200     ID   No fever/leukocytosis, recheck temp   Hx latent TB which was treated, no concern for TB   - grew MAC on AFB culture from most recent admission. at [facility]  -MSSA Bacteremia 9/1, allergic to cephalosprins and PCN, on Vanco as per ID, renal dosing,   - sputum also grew E.Coli-ESBL, as per ID recs for  Bradford through 9/7( 1 week course as per ID) , afebrile.  - 9/8:  spike  temp 38.1C, has O. externa, has a wick, recs are to get a CT to R/O an abscess/bony involvement. cont Meropenem  9/9: ptn w fever overnight to 100.8,  may need shiley pulled if bacteremic, needs NECK CT as per ENT to R/O Mastoid bone involvement while having ongoing purulent DC from L ear 2/2 O. externa, getting daily wick changes  9/10:  spiked 102 overnight through Bradford and Vanco, purulent DC from O. externa, ENT recommend Ct of mastoid. ptn in HD, has Shiley in place, consider pulling shiley and send for Cx. would d/w Renal, and consider contacting  ID, last seen by Dr. Conner 9/6 9/11: remains febrile, Dr. Conner reconsulted. cont Bradford, Vanco  9/12: tmax 100.5, fever curve is improving, awaiting Left ear CT, on Bradford since 9/1. on  vanco for MSSA Bacteremia, does not tolerate cephalosporins. through 10/2  9/13: on full vent support via trache, appears uncomfortable and onur 2/2 Left O. externa. has an incidental left middle ear tumor, no acute middle ear interventions recommended, left ear wick replaced. on Meropenem, cx from Ear DC is neg. On Vanco for MSSA bacteremia through 10/2, course of Meropenem as per ID, afebrile in the past 24 hrs . Cardura for HTN resumed, HGB dropped to 6.4, got a dose of EPO and 1UPRBC, rpt 7.8, remains on HEPARIN drip for LEFT popliteal DVT  9/14: cont on Mupirocin locally to Left ear, cont IV Bradford, ENT signed off, afebrile x 48 hrs, Mro course to be determined by ID, on Vanco for MSSA bacteremia through 10/2. ongoing worsening hyponatremia, Hypertonic saline as per renal, no HD today, s/p 1UPRBC 9/13  9/15: spiking temps again, if cont to spike as per ID re PAN scan. cont Vanco for MSSA Bacteremia  9/16-18: no temp overnight  afebrile, cont to have Left ear DC,   on Vanco and ,bradford through 10/2  On IV Fluconazole for fungal cx+ from O. externa Discharge.   fluconazole started 9/21, ptn has an allergic rash. dount its 2/2 to MEROPENEM. i feel its 2/2 FLUCONAZOLE. MEropenem was DCed. on VANCO based on levels for MSSA bacteremia        Heme/Onc  ppx: place on SCD  Transfuse for hgb 6.4, no obv bleed. HDS  LEFT POPLITEAL VEIN ACUTE DVT on 9/10    Ethics  GOC - Discussed GOC with daughter and , they have opted in the past for full code. and she remains full code at present

## 2023-09-22 NOTE — PROGRESS NOTE ADULT - ASSESSMENT
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 While in MICU patient noted with worsening renal failure requiring HD initiation, CGE/ Melena s/p EGD 8/31 found with esophagitis and erosive gastropathy, new right cerebellar infarct and fevers second to ESBL COLI and MSSA VAP, MSSA bacteremia, left ear otitis externa and drug rxn to cephalosporins Course further complicated by prolonged vent time s/p tracheostomy 9/1 and transferred to RCU 9/3 completed by recurrent fevers with high PIP, respiratory acidosis and concern for volume overloaded.     NEUROLOGY  # Metabolic Encephalopath vs NEW cerebella infarct   - Baseline MS AOX3 with short term memory changes per family however noted with concern for poor mentation in ICU  - MRI (8/17) with NEW right cerebellar infarct and old left PCA/occipital infarcts  - STEPHANIE (8/17) with AV showing two linear mobile echogenic elements attached to valve leaflets consistent with Lambel's excrescence, but no TRINITY thrombus, and lambel's excrescence with uncertain clinical implication.   - EEG (8/11-8/15) with no seizures   - ILR interrogation (9/7) with SR and PACs falsely recorded as AF.   - Attempted to resume ASA for medical management but held given GIB   - Continue on Lipitor for medical management   - Course complicated by questionable head and body shaking 9/8 however prolactin elevated and shakes head yes/no to some questions.   - Lethargy noted, most likely related to rising BUN    CARDIOVASCULAR  # HTN Urgency   # Septic vs vasoplegic shock   - Labile BPs ranging in between SBP 80s-200s and attempted labetalol, however noted with hypotension and bradycardia likely from BB toxicity vs shock state?    - Holding Labetalol and Catapres   - c/w Cardura for now   - Hypotensive in the 80s systolic overnight, requiring Midodrine 20mg x 1 (resolved)   - Stable today, still mildly hypotensive, but no intervention at this time     # Hx of HFpEF with likely ADHF with volume overload.   - ECHO (7/2023) with EF 59 with severe LVH and diastolic dysfunction   - STEPHANIE (8/18) with normal LVRVSF however noted with increased LA pressures and persistent LA septal bowing into RA.   - ECHO (8/11) with EF 60 with mild LVH, normal LVRVSF, and mild ddfxn.  - Remove volume with HD sessions and intermittent bumex pushes as BP allows    - c/w Bumex 2mg IV BID for now - Monitor UOP and electrolytes     HEENT   # Left ear OE   - ENT evaluation (9/7) with no mastoid tenderness, however found with positive swelling and excoriation to left auricle and tenderness to manipulation of auricle. Debrided crusting of auricle and EAC evaluated with edema and unable to visualize TM. EAC debrided and found with watery exudate.   - s/p CiproHC (9/5 - 9/16)   - Continue on Bradford (9/1 - ) as below   - ENT following and ear wick change QD   - Wick out but needs ? Debridement wound care called/fu ent   - ENT recommending CT IAC w/ IV con but family is refusing as concerned given CKD, kidneys wouldn't recover from IV contrast. Spoke with Nephrology, ENT, and patient's  at length. Planned for CT non con and per , decision will be made from there but for now doesn't want to risk further harming kidneys.  - CT IAC showing acute on chronic left-sided otitis externa and acute on chronic left-sided otomastoiditis. Superimposed left-sided tympanosclerosis. Superimposed cholesteatoma formation within the left tympanomastoid cavity cannot be excluded. No evidence of malignant otitis externa.  - ENT consulted (9/20) for white purulent discharge from left ear, recc: ENT:  acetic acid drops BID to left ear. mupirocin ointment to the left pinna BID, cover with xeroform after placing mupirocin ointment. pressure offloading of the left ear. wound care consulted for left pinna pressure injury.     # Oral Lesions with questionable Zoster vs Trauma?   - Patient with tongue pain and seen with anterior ulcerations consolidating and crusting and posterior ulceration.  - Case discussed with pathology resident and culture/ cytology sent.   - HSV negative and Path (9/6) with normal appearing epithelial cells singly and in clusters devoid of viral cytopathic effect.   - Continue on magic mouth wash for pain    RESPIRATORY  # AHHRF second to volume overload   - Hx of CKD and HFpEF presented with SOB and hypercarbia second to volume overload requiring intubation and HD initiation   - Vent weaning attempted however limited requiring tracheostomy (9/1) with IP   - Course complicated by PIPs elevated (50s) and respiratory acidosis second to secretions vs volume ?    - Vent adjusted 20/300/5/30 without improvement.   - POCUS (9/8) with BL pleural effusions (large on right and small on left)   - CT CHEST (9/8) with TBM, BL pleural effusions and continued consolidations   - Continue on vent and given TBM increased PEEP (9/9) to 20/300/8/40 with overall improvement   - Continue on Duoneb and HTS for airway clearance   - Continue volume removal with diuresis and aggressive UF sessions.   - Discussing possible thora but appears improved and will monitor.  ]  - Bedside US showing decrease in pleural effusion - hold off thoracentesis 9/10     GI  # UGIB   - CGE x 1 episode on 8/24 and melena x 2 episodes on 8/31 likely residual blood.   - EGD (8/31) found with esophagitis and erosive gastropathy  - Continue on PPI BID through late October and then PPI QD   - No melena     # Dysphagia   - NGT-TF continued   - Pending PEG however needs improvement in infectious status prior to placement.     RENAL  # Progressive CKD   - Presented volume overload and progressive RUSS on CKD (baseline CRE in 2s and mdoe into the 3s on admission) likely obstruction vs low flow state with shock vs AIN from drug reaction issue?  - POCUS with no signs of hydronephrosis   - Pressor support started with improvement in renal function  - Attempted steroid course in ICU without improvement in renal function   - Case discussed at length with renal and initiated on HD in ICU and now continued on HD TTHS, however remains volume overloaded.   - Patient oliguric however slightly responding to Bumex pushes - does not make enough urine   - Nephro following - Plan to resume HD TIW - Nephro recc holding HD alissa (9/21)   - Monitor renal function and UOP, and electrolytes for now  - Monitor urine output - Will perform bladder scan/straight cath as needed if retaining urine     Hyponatremia (improving) Na 123>>125  - c/w Salt tabs - s/p Hypertonic 1.5%NS @ 50cc/hr x 5 hr  - Renal- Will resume HD i/s/o of rising BUN, poor urine output on Bumex IVP     # Hyperphosphatemia (resolved)   - PTH normal and elevated phos likely from renal failure  - s/p Phos binder with Renvela - now d'beth as level wnl      INFECTIOUS DISEASE  # ESBL ECOLI and MSSA VAP c/b MSSA bacteremia   - RVP/ COVID (8/11) negative   - SCx (8/11) with MSSA s/p cefepime (8/12-8/13) and then ancef (8/14) however noted with drug reaction and then changed to vanco and aztreonam (8/12-8/13) in ICU .   - Course complicated by recurrent fevers and return of MSSA with bacteremia.   - SCx (9/1) with MSSA and ESBL ECOLI   - BAL (9/1) with MSSA   - BCx (9/1) with MSSA and cleared on (9/4)   - ECHO (9/6) unable to rule out endocarditis   - Continue on Bradford (9/4 - ) and Vanco BY DOSE POST HD (9/4, 9/7, 9/9 ...) with duration TBD at this time.   - Given inability to rule out endocarditis will discuss possible 4 wks with ID?   - Course complicated by fever (9/7) and UA with leuks but no bacteria, however compared to prior improved, RVP/COVID and SCx negative, and RPT CXR similar to prior   - LE DOPPLERS- Blood and sputum cx NTD; Acute left deep vein thrombosis of the left popliteal vein.  - Febrile overnight 102 recultured and sent off   -Pt receiving vanco post HD however today given vanco 750mg x 1 will check Vanco level at end of HD session and dose   - BCx (9/20): sent, pending result  - ID recc CT C/A/P w/ IV contrast - on hold d/t unsure if pt tolerate HD alissa  - add fluconazole 200 qd (9/21)    # Left ear OE   - ENT evaluation (9/7) with no mastoid tenderness, however found with positive swelling and excoriation to left auricle and tenderness to manipulation of auricle. Debrided crusting of auricle and EAC evaluated with edema and unable to visualize TM. EAC debrided and found with watery exudate.   - s/p CiproHC (9/5 - 9/16)     # MRSA PCRs   - MRSA PCR (8/11 and 8/14) negative   - Next MRSA + PCR ordered for 10/8     HEME  # Anemia second to GIB vs renal disease   - Hold chemical DVT PPX given bleeding/ waxing and waning HH   - s/p multiple units of PRBCs (8/15, 8/21, 8/28, 8/31 and 9/8)   - Anemia panel with AOCD but with low %sat and ferrous sulfate added to optimize   - Monitor HH and discuss with renal for EPO sometime next week.   - Spoke with GI for clearance to start Heparin gtt for + DVT   - restarted heparin gtt (9/21)    Vascular  # DVT  - Pt with + popliteal DVT on SCD Spoke with GI no absolute contraindication for starting Heparin - advise close monitoring for bleeding - Do stat CTA if bleeding occurs and call IR   - Pt specific heparin gtt started with goal PTT 60-85  - will monitor closely   - restarted heparin gtt (9/21)    ONC   # Hx of Breast CA   - Patient dx in 2018 and s/p BL mastectomy (radical on right) and chemo and RT.   - Supportive care.     ENDOCRINE  # IDDM2   - Continue on NPH 3U and ISS   - Monitor FS and adjust as needed     LINES/ TUBES  - R ARTHUR TAYLOR (8/19 - 9/21)     SKIN  # Drug Eruption   - Attempted cefepime (8/12) vs ancef (8/13-8/14) and then noted with drug rxn.   - Hold further cephalosporins at this time   - s/p Steroids with prednisone 40 (8/18 - 9/2) then prednisone 30 (9/3-9/7), then prednisone 20 (9/8 - 9/10), then prednisone 10mg (9/11-9/13) then turn off.     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 While in MICU patient noted with worsening renal failure requiring HD initiation, CGE/ Melena s/p EGD 8/31 found with esophagitis and erosive gastropathy, new right cerebellar infarct and fevers second to ESBL COLI and MSSA VAP, MSSA bacteremia, left ear otitis externa and drug rxn to cephalosporins Course further complicated by prolonged vent time s/p tracheostomy 9/1 and transferred to RCU 9/3 completed by recurrent fevers with high PIP, respiratory acidosis and concern for volume overloaded.     NEUROLOGY  # Metabolic Encephalopath vs NEW cerebella infarct   - Baseline MS AOX3 with short term memory changes per family however noted with concern for poor mentation in ICU  - MRI (8/17) with NEW right cerebellar infarct and old left PCA/occipital infarcts  - STEPHANIE (8/17) with AV showing two linear mobile echogenic elements attached to valve leaflets consistent with Lambel's excrescence, but no TRINITY thrombus, and lambel's excrescence with uncertain clinical implication.   - EEG (8/11-8/15) with no seizures   - ILR interrogation (9/7) with SR and PACs falsely recorded as AF.   - Attempted to resume ASA for medical management but held given GIB   - Continue on Lipitor for medical management   - Course complicated by questionable head and body shaking 9/8 however prolactin elevated and shakes head yes/no to some questions.   - Lethargy noted, most likely related to rising BUN    CARDIOVASCULAR  # HTN Urgency   # Septic vs vasoplegic shock   - Labile BPs ranging in between SBP 80s-200s and attempted labetalol, however noted with hypotension and bradycardia likely from BB toxicity vs shock state?    - Holding Labetalol and Catapres   - c/w Cardura for now   - Hypotensive in the 80s systolic overnight, requiring Midodrine 20mg x 1 (resolved)   - Stable today, still mildly hypotensive, but no intervention at this time     # Hx of HFpEF with likely ADHF with volume overload.   - ECHO (7/2023) with EF 59 with severe LVH and diastolic dysfunction   - STEPHANIE (8/18) with normal LVRVSF however noted with increased LA pressures and persistent LA septal bowing into RA.   - ECHO (8/11) with EF 60 with mild LVH, normal LVRVSF, and mild ddfxn.  - Remove volume with HD sessions and intermittent bumex pushes as BP allows    - c/w Bumex 2mg IV BID for now - Monitor UOP and electrolytes     HEENT   # Left ear OE   - ENT evaluation (9/7) with no mastoid tenderness, however found with positive swelling and excoriation to left auricle and tenderness to manipulation of auricle. Debrided crusting of auricle and EAC evaluated with edema and unable to visualize TM. EAC debrided and found with watery exudate.   - s/p CiproHC (9/5 - 9/16)   - Continue on Bradford (9/1 - ) as below   - ENT following and ear wick change QD   - Wick out but needs ? Debridement wound care called/fu ent   - ENT recommending CT IAC w/ IV con but family is refusing as concerned given CKD, kidneys wouldn't recover from IV contrast. Spoke with Nephrology, ENT, and patient's  at length. Planned for CT non con and per , decision will be made from there but for now doesn't want to risk further harming kidneys.  - CT IAC showing acute on chronic left-sided otitis externa and acute on chronic left-sided otomastoiditis. Superimposed left-sided tympanosclerosis. Superimposed cholesteatoma formation within the left tympanomastoid cavity cannot be excluded. No evidence of malignant otitis externa.  - ENT consulted (9/20) for white purulent discharge from left ear, recc: ENT:  acetic acid drops BID to left ear. mupirocin ointment to the left pinna BID, cover with xeroform after placing mupirocin ointment. pressure offloading of the left ear.    # Oral Lesions with questionable Zoster vs Trauma?   - Patient with tongue pain and seen with anterior ulcerations consolidating and crusting and posterior ulceration.  - Case discussed with pathology resident and culture/ cytology sent.   - HSV negative and Path (9/6) with normal appearing epithelial cells singly and in clusters devoid of viral cytopathic effect.   - Continue on magic mouth wash for pain    RESPIRATORY  # AHHRF second to volume overload   - Hx of CKD and HFpEF presented with SOB and hypercarbia second to volume overload requiring intubation and HD initiation   - Vent weaning attempted however limited requiring tracheostomy (9/1) with IP   - Course complicated by PIPs elevated (50s) and respiratory acidosis second to secretions vs volume ?    - Vent adjusted 20/300/5/30 without improvement.   - POCUS (9/8) with BL pleural effusions (large on right and small on left)   - CT CHEST (9/8) with TBM, BL pleural effusions and continued consolidations   - Continue on vent and given TBM increased PEEP (9/9) to 20/300/8/40 with overall improvement   - Continue on Duoneb and HTS for airway clearance   - Continue volume removal with diuresis and aggressive UF sessions.   - Discussing possible thora but appears improved and will monitor.  ]  - Bedside US showing decrease in pleural effusion - hold off thoracentesis 9/10     GI  # UGIB   - CGE x 1 episode on 8/24 and melena x 2 episodes on 8/31 likely residual blood.   - EGD (8/31) found with esophagitis and erosive gastropathy  - Continue on PPI BID through late October and then PPI QD   - No melena     # Dysphagia   - NGT-TF continued   - Pending PEG however needs improvement in infectious status prior to placement.     RENAL  # Progressive CKD   - Presented volume overload and progressive RUSS on CKD (baseline CRE in 2s and mode into the 3s on admission) likely obstruction vs low flow state with shock vs AIN from drug reaction issue?  - POCUS with no signs of hydronephrosis   - Pressor support started with improvement in renal function  - Attempted steroid course in ICU without improvement in renal function   - Case discussed at length with renal and initiated on HD in ICU and now continued on HD TTHS, however remains volume overloaded.   - Patient oliguric however slightly responding to Bumex pushes - does not make enough urine   - Nephro following - Plan to resume HD TIW - Nephro recc holding HD alissa (9/21)   - Monitor renal function and UOP, and electrolytes for now  - Monitor urine output - Will perform bladder scan/straight cath as needed if retaining urine     Hyponatremia (improving) Na 123>>125  - c/w Salt tabs - s/p Hypertonic 1.5%NS @ 50cc/hr x 5 hr  - Renal- Will resume HD i/s/o of rising BUN, poor urine output on Bumex IVP     # Hyperphosphatemia (resolved)   - PTH normal and elevated phos likely from renal failure  - s/p Phos binder with Renvela - now d'beth as level wnl      INFECTIOUS DISEASE  # ESBL ECOLI and MSSA VAP c/b MSSA bacteremia   - RVP/ COVID (8/11) negative   - SCx (8/11) with MSSA s/p cefepime (8/12-8/13) and then ancef (8/14) however noted with drug reaction and then changed to vanco and aztreonam (8/12-8/13) in ICU .   - Course complicated by recurrent fevers and return of MSSA with bacteremia.   - SCx (9/1) with MSSA and ESBL ECOLI   - BAL (9/1) with MSSA   - BCx (9/1) with MSSA and cleared on (9/4)   - ECHO (9/6) unable to rule out endocarditis   - Continue on Bradford (9/4 - ) and Vanco BY DOSE POST HD (9/4, 9/7, 9/9 ...) with duration TBD at this time.   - Given inability to rule out endocarditis will discuss possible 4 wks with ID?   - Course complicated by fever (9/7) and UA with leuks but no bacteria, however compared to prior improved, RVP/COVID and SCx negative, and RPT CXR similar to prior   - LE DOPPLERS- Blood and sputum cx NTD; Acute left deep vein thrombosis of the left popliteal vein.  - Febrile overnight 102 recultured and sent off   -Pt receiving vanco post HD however today given vanco 750mg x 1 will check Vanco level at end of HD session and dose   - BCx (9/20): sent, pending result  - ID recc CT C/A/P w/ IV contrast - on hold d/t unsure if pt tolerate HD alissa  - C/w fluconazole 200 qd (9/21)    # Left ear OE   - ENT evaluation (9/7) with no mastoid tenderness, however found with positive swelling and excoriation to left auricle and tenderness to manipulation of auricle. Debrided crusting of auricle and EAC evaluated with edema and unable to visualize TM. EAC debrided and found with watery exudate.   - s/p CiproHC (9/5 - 9/16)     # MRSA PCRs   - MRSA PCR (8/11 and 8/14) negative   - Next MRSA + PCR ordered for 10/8     HEME  # Anemia second to GIB vs renal disease   - Hold chemical DVT PPX given bleeding/ waxing and waning HH   - s/p multiple units of PRBCs (8/15, 8/21, 8/28, 8/31 and 9/8)   - Anemia panel with AOCD but with low %sat and ferrous sulfate added to optimize   - Monitor HH and discuss with renal for EPO sometime next week.   - Spoke with GI for clearance to start Heparin gtt for + DVT   - restarted heparin gtt (9/21)    Vascular  # DVT  - Pt with + popliteal DVT on SCD Spoke with GI no absolute contraindication for starting Heparin - advise close monitoring for bleeding - Do stat CTA if bleeding occurs and call IR   - Pt specific heparin gtt started with goal PTT 60-85  - will monitor closely   - restarted heparin gtt (9/21)    ONC   # Hx of Breast CA   - Patient dx in 2018 and s/p BL mastectomy (radical on right) and chemo and RT.   - Supportive care.     ENDOCRINE  # IDDM2   - Continue on NPH 3U and ISS   - Monitor FS and adjust as needed     LINES/ TUBES  - R ARTHUR TAYLOR (8/19 - 9/21)     SKIN  # Drug Eruption   - Attempted cefepime (8/12) vs ancef (8/13-8/14) and then noted with drug rxn.   - Hold further cephalosporins at this time   - s/p Steroids with prednisone 40 (8/18 - 9/2) then prednisone 30 (9/3-9/7), then prednisone 20 (9/8 - 9/10), then prednisone 10mg (9/11-9/13) then turn off.     ETHICS/ GOC    - FULL CODE     DISPO - TBD

## 2023-09-22 NOTE — PROGRESS NOTE ADULT - SUBJECTIVE AND OBJECTIVE BOX
Subjective: Patient seen and examined. No new events except as noted.     REVIEW OF SYSTEMS:  Unable to obtain     MEDICATIONS:  MEDICATIONS  (STANDING):  acetic acid 0.25% Topical Irrigation 1 Application(s) Topical two times a day  albuterol/ipratropium for Nebulization 3 milliLiter(s) Nebulizer every 6 hours  atorvastatin 40 milliGRAM(s) Oral at bedtime  buMETAnide Injectable 2 milliGRAM(s) IV Push two times a day  chlorhexidine 0.12% Liquid 15 milliLiter(s) Oral Mucosa every 12 hours  chlorhexidine 2% Cloths 1 Application(s) Topical daily  dextrose 5%. 1000 milliLiter(s) (50 mL/Hr) IV Continuous <Continuous>  dextrose 5%. 1000 milliLiter(s) (100 mL/Hr) IV Continuous <Continuous>  dextrose 50% Injectable 12.5 Gram(s) IV Push once  dextrose 50% Injectable 25 Gram(s) IV Push once  dextrose 50% Injectable 25 Gram(s) IV Push once  doxazosin 6 milliGRAM(s) Oral <User Schedule>  epoetin odalis (EPOGEN) Injectable 63350 Unit(s) SubCutaneous <User Schedule>  ferrous    sulfate Liquid 300 milliGRAM(s) Enteral Tube daily  fluconAZOLE IVPB 200 milliGRAM(s) IV Intermittent every 24 hours  glucagon  Injectable 1 milliGRAM(s) IntraMuscular once  heparin  Infusion 1050 Unit(s)/Hr (10.5 mL/Hr) IV Continuous <Continuous>  insulin lispro (ADMELOG) corrective regimen sliding scale   SubCutaneous every 6 hours  insulin NPH human recombinant 3 Unit(s) SubCutaneous every 6 hours  meropenem  IVPB 500 milliGRAM(s) IV Intermittent every 12 hours  midodrine. 10 milliGRAM(s) Oral three times a day  mupirocin 2% Ointment 1 Application(s) Topical two times a day  pantoprazole  Injectable 40 milliGRAM(s) IV Push two times a day  psyllium Powder 1 Packet(s) Oral daily  sodium chloride 2 Gram(s) Oral two times a day  vancomycin  IVPB 750 milliGRAM(s) IV Intermittent once      PHYSICAL EXAM:  T(C): 37.4 (09-22-23 @ 09:21), Max: 37.7 (09-21-23 @ 11:45)  HR: 104 (09-22-23 @ 10:57) (78 - 110)  BP: 93/33 (09-22-23 @ 09:21) (89/39 - 112/43)  RR: 17 (09-22-23 @ 09:21) (17 - 17)  SpO2: 96% (09-22-23 @ 10:57) (95% - 99%)  Wt(kg): --  I&O's Summary    21 Sep 2023 07:01  -  22 Sep 2023 07:00  --------------------------------------------------------  IN: 840 mL / OUT: 800 mL / NET: 40 mL            Appearance: NAD, +trach  HEENT: dry oral mucosa  Lymphatic: No lymphadenopathy  Cardiovascular: Normal S1 S2, No JVD, No murmurs, No edema  Respiratory: Decreased BS, + trach to vent	  Neuro: opens eyes  Gastrointestinal: Soft, Non-tender, + BS, + NGT  Skin: No rashes, No ecchymoses, No cyanosis	  Extremities: No strength/ROM 2/2 sedation, + BL LE edema  Vascular: Peripheral pulses palpable 2+ bilaterally    Mode: AC/ CMV (Assist Control/ Continuous Mandatory Ventilation), RR (machine): 17, TV (machine): 340, FiO2: 35, PEEP: 8, ITime: 0.84, MAP: 15, PIP: 46  Arterial Blood Gas:  09-19 @ 17:22  7.28/52/75/24/97.9/-3.0      LABS:    CARDIAC MARKERS:                                9.0    5.40  )-----------( 190      ( 22 Sep 2023 07:40 )             27.6     09-22    126<L>  |  94<L>  |  97<H>  ----------------------------<  210<H>  4.7   |  24  |  2.52<H>    Ca    8.8      22 Sep 2023 07:40  Phos  3.7     09-22  Mg     2.40     09-22    TPro  5.7<L>  /  Alb  1.8<L>  /  TBili  0.3  /  DBili  x   /  AST  33<H>  /  ALT  11  /  AlkPhos  342<H>  09-22    proBNP:   Lipid Profile:   HgA1c:   TSH:     Negative          TELEMETRY: 	    ECG:  	  RADIOLOGY:   DIAGNOSTIC TESTING:  [ ] Echocardiogram:  [ ]  Catheterization:  [ ] Stress Test:    OTHER:

## 2023-09-22 NOTE — ADVANCED PRACTICE NURSE CONSULT - RECOMMEDATIONS
Recommend dermatology if warranted or if rash gets worse- as per ACP provider rash seems to be improving.     Topical recommendations:     Left Pinna- recommend leaving open to air as a stable scab.     Bilateral breasts, abdominal pannus- Cleanse with soap and water, pat dry. Apply Interdry textile sheeting, under intertriginous folds leaving 2 inches exposed at ends to wick, remove to wash & dry affected area, then replace. Individual sheeting may be used for up to 5 days unless soiled.     Sacral/gluteal fold- continue with  TRIAD paste twice a day and PRN with incontinent episodes. With episodes of incontinence only remove soiled layer of Triad, then reinforce with thin layer.     Continue low air loss bed therapy,  heel elevation with offloading boots, turn & reposition q2h with Z-flow fluidized pillow, continue moisture management with barrier creams as specified above & single breathable pad, continue measures to decrease friction/shear.   Plan discussed with patients  at bedside- educated on topical wound therapy to optimize wound healing. Questions answered.     Findings and recs discussed with [facility] provider.     Please contact Wound/Ostomy Care Service Line if we can be of further assistance (ext [phone]).

## 2023-09-22 NOTE — ADVANCED PRACTICE NURSE CONSULT - ASSESSMENT
General: Erythematous patches to chest abdomen- drug rash?, anasarca, no other significant changes noted from previous assessment.     Left Pinna- with dried serous drainage unable to be removed with cleansing. No suspicion of pressure injury as scab in inner ear. Edema improved. No s/s of infection. Discoloration due to dried drainage.     Bilateral breasts, abdominal pannus with moisture associated dermatitis presenting with increased moisture and erythema, no suspicion of candidiasis.     Sacral/gluteal fold- incontinence associated dermatitis with scattered areas of denudations- seems to be improving since last assessment.     Patient continues to be critically ill- at high risk for skin breakdown. On assessment patient on a low air loss surface, heel offloading boots in place, Z-flow positioning pillow utilized, moisture management in place with use of one incontinence pad. Turning and positioning Q2hrs.

## 2023-09-23 NOTE — PROGRESS NOTE ADULT - ASSESSMENT
72 y/o F well known to me from my housecal outptn practice. she was admitted at [facility] 7/12-7/22 w aspiration PNA, was treated w CEFEPIME, developed an allergic rash,  dCHF, + MAC on AFB culture, had been progressively getting more and more lethargic and dyspneic at home since DC.   In  am of 8/11/23  ptn presented with respiratory distress w hypoxia and hypercarbia requiring intubation 2/2 volume overload +/- Asp PNA      Neuro   responds appropriately   Baseline MS AOx3, aphasic   - h/o CVA , on aspirin and statin . resumed w feeding tube, ASA resumed 8/26  - eeg  2/2 tremors, no sz focus  - less responsive in the past few days  - MRI 8/17:  new R cerebellar infarct, old left PCA/Occipital infarct. 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 Mcnulty, now in RCU  s/p septic shock overnight 9/1, now on meropenem, HDS  s/p trache, on vent support, copious secretions      Renal   Hyperkalemia with EKG changes  , initially treated w LOKELMA,  now resolved   Ptn well know to Dr. Colon, consult reviewed    HD 8/19,  8/21, 8/26 and 8/28.  s/p PUF 8/29 2.5 Liters, HD 8/30,  9/1, 9/4  started chronic HD 9/7,   cardura resumed  on diuretics  hyponatremic  ptn is less responsive, its possible she is uremic, unable to complete HD 9/19 2/2 bleeding at [facility],   [facility] shiley was removed 9/20, if need HD will need another shiley placed as per renal. On IV Fluconazole for fungal cx+ from O. externa DC        GI  NPO  on tube feeds  coffee ground emesis x 1 8/24, melena overnight 8/31, s/p 1UPRBC, EGD/Colonoscopy: erosive gastropathy, esophagisits, on colonoscopy old blood seen no active bleeding, poor prep  family to decide re PEG placement    Endocrine  T2DM   A1C 7.1 in 7/2023   - BG goal 140-200     ID   No fever/leukocytosis, recheck temp   Hx latent TB which was treated, no concern for TB   - grew MAC on AFB culture from most recent admission. at [facility]  -MSSA Bacteremia 9/1, allergic to cephalosprins and PCN, on Vanco as per ID, renal dosing,   - sputum also grew E.Coli-ESBL, as per ID recs for  Bradford through 9/7( 1 week course as per ID) , afebrile.  - 9/8:  spike  temp 38.1C, has O. externa, has a wick, recs are to get a CT to R/O an abscess/bony involvement. cont Meropenem  9/9: ptn w fever overnight to 100.8,  may need shiley pulled if bacteremic, needs NECK CT as per ENT to R/O Mastoid bone involvement while having ongoing purulent DC from L ear 2/2 O. externa, getting daily wick changes  9/10:  spiked 102 overnight through Bradford and Vanco, purulent DC from O. externa, ENT recommend Ct of mastoid. ptn in HD, has Shiley in place, consider pulling shiley and send for Cx. would d/w Renal, and consider contacting  ID, last seen by Dr. Conner 9/6 9/11: remains febrile, Dr. Conner reconsulted. cont Bradford, Vanco  9/12: tmax 100.5, fever curve is improving, awaiting Left ear CT, on Bradford since 9/1. on  vanco for MSSA Bacteremia, does not tolerate cephalosporins. through 10/2  9/13: on full vent support via trache, appears uncomfortable and onur 2/2 Left O. externa. has an incidental left middle ear tumor, no acute middle ear interventions recommended, left ear wick replaced. on Meropenem, cx from Ear DC is neg. On Vanco for MSSA bacteremia through 10/2, course of Meropenem as per ID, afebrile in the past 24 hrs . Cardura for HTN resumed, HGB dropped to 6.4, got a dose of EPO and 1UPRBC, rpt 7.8, remains on HEPARIN drip for LEFT popliteal DVT  9/14: cont on Mupirocin locally to Left ear, cont IV Bradford, ENT signed off, afebrile x 48 hrs, Mro course to be determined by ID, on Vanco for MSSA bacteremia through 10/2. ongoing worsening hyponatremia, Hypertonic saline as per renal, no HD today, s/p 1UPRBC 9/13  9/15: spiking temps again, if cont to spike as per ID re PAN scan. cont Vanco for MSSA Bacteremia  9/16-18: no temp overnight  afebrile, cont to have Left ear DC,   on Vanco and ,bradford through 10/2  On IV Fluconazole for fungal cx+ from O. externa Discharge.   fluconazole started 9/21, ptn has an allergic rash on 9/22. doubt its 2/2 to MEROPENEM. i feel its 2/2 FLUCONAZOLE. MEropenem was DCed. on VANCO based on levels for MSSA bacteremia        Heme/Onc  ppx: place on SCD  Transfuse for hgb 6.4, no obv bleed. HDS  LEFT POPLITEAL VEIN ACUTE DVT on 9/10    Ethics  GOC - Discussed GOC with daughter and , they have opted in the past for full code. and she remains full code at present

## 2023-09-23 NOTE — PROGRESS NOTE ADULT - NS ATTEND AMEND GEN_ALL_CORE FT
I have made amendments to the documentation where necessary. Additional comments: Pt is a 73F with PMHx IDDMII, CKD Stage IV, CHFpEF, latent TB (s/p tx,) hx breast cancer (2018, s/p mastectomy and adjuvant CT/RT), and hx CVA s/p trach/PEG (now decannulated) initially presenting to [facility] on 8/11/23 with acute hypoxemic and hypercapnic respiratory failure s/p ETT intubation/MV and ARF with pulmonary edema c/b HTN emergency requiring nicardipine drip transferred to MICU for further management with failure to wean from ventilator s/p tracheostomy placement and RCU transfer for further management.     Pt initially p/w worsening RUE shaking and dysconjugate gaze with MRI 8/17 (+) new right cerebellar, midbrain, and multiple tiny embolic infarcts as well as known left PCA CVA. EEG (-). 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 ILR in place from prior CVA evaluation, last interrogated 9/7 without AF. At baseline, pt reportedly wheelchair bound with RUE tremors and some ADL assistance. Pt was unable to tolerate ASA 2/2 GIB, will re-attempt once more stable. Worsening mentation this week possibly 2/2 metabolic/infectious encephalopathy in the setting of hyponatremia, uremia, and recurrent infection. No new focal deficits noted. Pt intermittently awakens but only for very short periods of time.    Pt with acute hypoxemic and hypercapnic respiratory failure s/p ETT intubation/MV possibly 2/2 flash pulmonary edema in the setting of HTN emergency +/- aspiration event. Pt with failure of ventilatory weaning now s/p tracheostomy placement (IP 9/1.) Of note, pt also noted to have evidence of midbrain CVA with possible concern for central effect on respiratory drive, will monitor carefully and continue weaning trials as tolerated. Pt able to spontaneously breath intermittently, will continue to attempt further weaning trials. Airway clearance therapy in place. Wean O2 supplementation for goal O2 saturation 90-95%. Oral hygiene and aspiration precautions in place. Trach care and suctioning as per RCU team.     Pt p/w ARF on CKD requiring initiation of HD 2/2 pulmonary edema and metabolic acidosis with metabolic encephalopathy in MICU. Now off HD since 9/20. Pt with (+) UOP on diuretics. No urgent indication for HD today. RIJ Shiley removed as no longer functioning. Finished prednisone taper for possible AIN. Strict I/Os and daily BMPs in place. Hospital course further c/b acute hypervolemic hyponatremia and uremia in the setting of renal failure not resolving despite hypertonic saline, diuretics, and salt tabs. Now slowly improving, HD as needed. If pt shows s/s requiring HD will need replacement catheter.     Hospital course further c/b recurrent infections, most recently with MSSA bacteremia (9/1, cleared 9/4 and 9/8) with (+) MSSA and ESBL EColi in BAL as well. Pt with possible cefepime vs. cefazolin drug-induced rash followed by transition to vancomycin. Was then found to have left otitis externa (9/5) s/p serial bedside debridement with worsening necrotic tissue/erythema for which she was also initiated on meropenem. Family refusing contrast, non-contrast study obtained with evidence of bone involvement but no indication for further surgical intervention. Pt underent ENT debridement x2 (last 9/21). Found to have new black spores and white discoloration, concern for candida infection. Given fevers and need for debridement fluconazole added to regimen 9/21. Will also continue with meropenem, CT imaging does not show significant enough bone involvement to require prolonged course for osteomyelitis. Plan to also complete vancomycin by level x4 week course through 10/2 (given inability to exclude endocarditis). Discussed with ID, recs appreciated. Obtain pan-CT scan if remains febrile.     Pt also with oropharyngeal dysphagia requiring NGTs followed by UGIB s/p EGD (8/31) found to have esophagitis/erosive gastropathy now on PPI BID. Pt further found to have popliteal DVT. No c/i by GI for A/C, pt initiated on heparin drip with patient-specific pTTs. Will need to ultimately transition to Coumadin x3-6 months after PEG placement.     Dispo pending medical optimization. Pt full code. DVT ppx full A/C with heparin drip

## 2023-09-23 NOTE — PROGRESS NOTE ADULT - ASSESSMENT
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 While in MICU patient noted with worsening renal failure requiring HD initiation, CGE/ Melena s/p EGD 8/31 found with esophagitis and erosive gastropathy, new right cerebellar infarct and fevers second to ESBL COLI and MSSA VAP, MSSA bacteremia, left ear otitis externa and drug rxn to cephalosporins Course further complicated by prolonged vent time s/p tracheostomy 9/1 and transferred to RCU 9/3 completed by recurrent fevers with high PIP, respiratory acidosis and concern for volume overloaded.     NEUROLOGY  # Metabolic Encephalopath vs NEW cerebella infarct   - Baseline MS AOX3 with short term memory changes per family however noted with concern for poor mentation in ICU  - MRI (8/17) with NEW right cerebellar infarct and old left PCA/occipital infarcts  - STEPHANIE (8/17) with AV showing two linear mobile echogenic elements attached to valve leaflets consistent with Lambel's excrescence, but no TRINITY thrombus, and lambel's excrescence with uncertain clinical implication.   - EEG (8/11-8/15) with no seizures   - ILR interrogation (9/7) with SR and PACs falsely recorded as AF.   - Attempted to resume ASA for medical management but held given GIB   - Continue on Lipitor for medical management   - Course complicated by questionable head and body shaking 9/8 however prolactin elevated and shakes head yes/no to some questions.   - Lethargy noted, most likely related to rising BUN    CARDIOVASCULAR  # HTN Urgency   # Septic vs vasoplegic shock   - Labile BPs ranging in between SBP 80s-200s and attempted labetalol, however noted with hypotension and bradycardia likely from BB toxicity vs shock state?    - Holding Labetalol and Catapres   - c/w Cardura for now   - Hypotensive in the 80s systolic overnight, requiring Midodrine 20mg x 1 (resolved)   - Stable today, still mildly hypotensive, but no intervention at this time     # Hx of HFpEF with likely ADHF with volume overload.   - ECHO (7/2023) with EF 59 with severe LVH and diastolic dysfunction   - STEPHANIE (8/18) with normal LVRVSF however noted with increased LA pressures and persistent LA septal bowing into RA.   - ECHO (8/11) with EF 60 with mild LVH, normal LVRVSF, and mild ddfxn.  - Remove volume with HD sessions and intermittent bumex pushes as BP allows    - c/w Bumex 2mg IV BID for now - Monitor UOP and electrolytes     HEENT   # Left ear OE   - ENT evaluation (9/7) with no mastoid tenderness, however found with positive swelling and excoriation to left auricle and tenderness to manipulation of auricle. Debrided crusting of auricle and EAC evaluated with edema and unable to visualize TM. EAC debrided and found with watery exudate.   - s/p CiproHC (9/5 - 9/16)   - Continue on Bradford (9/1 - ) as below   - ENT following and ear wick change QD   - Wick out but needs ? Debridement wound care called/fu ent   - ENT recommending CT IAC w/ IV con but family is refusing as concerned given CKD, kidneys wouldn't recover from IV contrast. Spoke with Nephrology, ENT, and patient's  at length. Planned for CT non con and per , decision will be made from there but for now doesn't want to risk further harming kidneys.  - CT IAC showing acute on chronic left-sided otitis externa and acute on chronic left-sided otomastoiditis. Superimposed left-sided tympanosclerosis. Superimposed cholesteatoma formation within the left tympanomastoid cavity cannot be excluded. No evidence of malignant otitis externa.  - ENT consulted (9/20) for white purulent discharge from left ear, recc: ENT:  acetic acid drops BID to left ear. mupirocin ointment to the left pinna BID, cover with xeroform after placing mupirocin ointment. pressure offloading of the left ear.    # Oral Lesions with questionable Zoster vs Trauma?   - Patient with tongue pain and seen with anterior ulcerations consolidating and crusting and posterior ulceration.  - Case discussed with pathology resident and culture/ cytology sent.   - HSV negative and Path (9/6) with normal appearing epithelial cells singly and in clusters devoid of viral cytopathic effect.   - Continue on magic mouth wash for pain    RESPIRATORY  # AHHRF second to volume overload   - Hx of CKD and HFpEF presented with SOB and hypercarbia second to volume overload requiring intubation and HD initiation   - Vent weaning attempted however limited requiring tracheostomy (9/1) with IP   - Course complicated by PIPs elevated (50s) and respiratory acidosis second to secretions vs volume ?    - Vent adjusted 20/300/5/30 without improvement.   - POCUS (9/8) with BL pleural effusions (large on right and small on left)   - CT CHEST (9/8) with TBM, BL pleural effusions and continued consolidations   - Continue on vent and given TBM increased PEEP (9/9) to 20/300/8/40 with overall improvement   - Continue on Duoneb and HTS for airway clearance   - Continue volume removal with diuresis and aggressive UF sessions.   - Discussing possible thora but appears improved and will monitor.  ]  - Bedside US showing decrease in pleural effusion - hold off thoracentesis 9/10     GI  # UGIB   - CGE x 1 episode on 8/24 and melena x 2 episodes on 8/31 likely residual blood.   - EGD (8/31) found with esophagitis and erosive gastropathy  - Continue on PPI BID through late October and then PPI QD   - No melena     # Dysphagia   - NGT-TF continued   - Pending PEG however needs improvement in infectious status prior to placement.     RENAL  # Progressive CKD   - Presented volume overload and progressive RUSS on CKD (baseline CRE in 2s and mode into the 3s on admission) likely obstruction vs low flow state with shock vs AIN from drug reaction issue?  - POCUS with no signs of hydronephrosis   - Pressor support started with improvement in renal function  - Attempted steroid course in ICU without improvement in renal function   - Case discussed at length with renal and initiated on HD in ICU and now continued on HD TTHS, however remains volume overloaded.   - Patient oliguric however slightly responding to Bumex pushes - does not make enough urine   - Nephro following - Plan to resume HD TIW - Nephro recc holding HD alissa (9/21)   - Monitor renal function and UOP, and electrolytes for now  - Monitor urine output - Will perform bladder scan/straight cath as needed if retaining urine     Hyponatremia (improving) Na 123>>125  - c/w Salt tabs - s/p Hypertonic 1.5%NS @ 50cc/hr x 5 hr  - Renal- Will resume HD i/s/o of rising BUN, poor urine output on Bumex IVP     # Hyperphosphatemia (resolved)   - PTH normal and elevated phos likely from renal failure  - s/p Phos binder with Renvela - now d'beth as level wnl      INFECTIOUS DISEASE  # ESBL ECOLI and MSSA VAP c/b MSSA bacteremia   - RVP/ COVID (8/11) negative   - SCx (8/11) with MSSA s/p cefepime (8/12-8/13) and then ancef (8/14) however noted with drug reaction and then changed to vanco and aztreonam (8/12-8/13) in ICU .   - Course complicated by recurrent fevers and return of MSSA with bacteremia.   - SCx (9/1) with MSSA and ESBL ECOLI   - BAL (9/1) with MSSA   - BCx (9/1) with MSSA and cleared on (9/4)   - ECHO (9/6) unable to rule out endocarditis   - Continue on Bradford (9/4 - ) and Vanco BY DOSE POST HD (9/4, 9/7, 9/9 ...) with duration TBD at this time.   - Given inability to rule out endocarditis will discuss possible 4 wks with ID?   - Course complicated by fever (9/7) and UA with leuks but no bacteria, however compared to prior improved, RVP/COVID and SCx negative, and RPT CXR similar to prior   - LE DOPPLERS- Blood and sputum cx NTD; Acute left deep vein thrombosis of the left popliteal vein.  - Febrile overnight 102 recultured and sent off   -Pt receiving vanco post HD however today given vanco 750mg x 1 will check Vanco level at end of HD session and dose   - BCx (9/20): sent, pending result  - ID recc CT C/A/P w/ IV contrast - on hold d/t unsure if pt tolerate HD alissa  - C/w fluconazole 200 qd (9/21)    # Left ear OE   - ENT evaluation (9/7) with no mastoid tenderness, however found with positive swelling and excoriation to left auricle and tenderness to manipulation of auricle. Debrided crusting of auricle and EAC evaluated with edema and unable to visualize TM. EAC debrided and found with watery exudate.   - s/p CiproHC (9/5 - 9/16)     # MRSA PCRs   - MRSA PCR (8/11 and 8/14) negative   - Next MRSA + PCR ordered for 10/8     HEME  # Anemia second to GIB vs renal disease   - Hold chemical DVT PPX given bleeding/ waxing and waning HH   - s/p multiple units of PRBCs (8/15, 8/21, 8/28, 8/31 and 9/8)   - Anemia panel with AOCD but with low %sat and ferrous sulfate added to optimize   - Monitor HH and discuss with renal for EPO sometime next week.   - Spoke with GI for clearance to start Heparin gtt for + DVT   - restarted heparin gtt (9/21)    Vascular  # DVT  - Pt with + popliteal DVT on SCD Spoke with GI no absolute contraindication for starting Heparin - advise close monitoring for bleeding - Do stat CTA if bleeding occurs and call IR   - Pt specific heparin gtt started with goal PTT 60-85  - will monitor closely   - restarted heparin gtt (9/21)    ONC   # Hx of Breast CA   - Patient dx in 2018 and s/p BL mastectomy (radical on right) and chemo and RT.   - Supportive care.     ENDOCRINE  # IDDM2   - Continue on NPH 3U and ISS   - Monitor FS and adjust as needed     LINES/ TUBES  - R ARTHUR TAYLOR (8/19 - 9/21)     SKIN  # Drug Eruption   - Attempted cefepime (8/12) vs ancef (8/13-8/14) and then noted with drug rxn.   - Hold further cephalosporins at this time   - s/p Steroids with prednisone 40 (8/18 - 9/2) then prednisone 30 (9/3-9/7), then prednisone 20 (9/8 - 9/10), then prednisone 10mg (9/11-9/13) then turn off.     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 While in MICU patient noted with worsening renal failure requiring HD initiation, CGE/ Melena s/p EGD 8/31 found with esophagitis and erosive gastropathy, new right cerebellar infarct and fevers second to ESBL COLI and MSSA VAP, MSSA bacteremia, left ear otitis externa and drug rxn to cephalosporins Course further complicated by prolonged vent time s/p tracheostomy 9/1 and transferred to RCU 9/3 completed by recurrent fevers with high PIP, respiratory acidosis and concern for volume overloaded.     NEUROLOGY  # Metabolic Encephalopath vs NEW cerebella infarct   - Baseline MS AOX3 with short term memory changes per family however noted with concern for poor mentation in ICU  - MRI (8/17) with NEW right cerebellar infarct and old left PCA/occipital infarcts  - STEPHANIE (8/17) with AV showing two linear mobile echogenic elements attached to valve leaflets consistent with Lambel's excrescence, but no TRINITY thrombus, and lambel's excrescence with uncertain clinical implication.   - EEG (8/11-8/15) with no seizures   - ILR interrogation (9/7) with SR and PACs falsely recorded as AF.   - Attempted to resume ASA for medical management but held given GIB   - Continue on Lipitor for medical management   - Course complicated by questionable head and body shaking 9/8 however prolactin elevated and shakes head yes/no to some questions.   - Lethargy noted    CARDIOVASCULAR  # HTN Urgency   # Septic vs vasoplegic shock   - Labile BPs ranging in between SBP 80s-200s and attempted labetalol, however noted with hypotension and bradycardia likely from BB toxicity vs shock state?    - Holding Labetalol and Catapres   - c/w Cardura for now   - Hypotensive in the 80s systolic overnight, requiring Midodrine 20mg x 1 (resolved)   - Stable today, still mildly hypotensive, but no intervention at this time     # Hx of HFpEF with likely ADHF with volume overload.   - ECHO (7/2023) with EF 59 with severe LVH and diastolic dysfunction   - STEPHANIE (8/18) with normal LVRVSF however noted with increased LA pressures and persistent LA septal bowing into RA.   - ECHO (8/11) with EF 60 with mild LVH, normal LVRVSF, and mild ddfxn.  - Remove volume with HD sessions and intermittent bumex pushes as BP allows    - c/w Bumex 2mg IV BID for now - Monitor UOP and electrolytes. Intermittently held due to low BP    HEENT   # Left ear OE   - ENT evaluation (9/7) with no mastoid tenderness, however found with positive swelling and excoriation to left auricle and tenderness to manipulation of auricle. Debrided crusting of auricle and EAC evaluated with edema and unable to visualize TM. EAC debrided and found with watery exudate.   - s/p CiproHC (9/5 - 9/16)   - Bradford discontinued. Rash on torso concerning for possible drug rash  - ENT following and ear wick change QD   - Wick out but needs ? Debridement wound care called/fu ent   - ENT recommending CT IAC w/ IV con but family is refusing as concerned given CKD, kidneys wouldn't recover from IV contrast. Spoke with Nephrology, ENT, and patient's  at length. Planned for CT non con and per , decision will be made from there but for now doesn't want to risk further harming kidneys.  - CT IAC showing acute on chronic left-sided otitis externa and acute on chronic left-sided otomastoiditis. Superimposed left-sided tympanosclerosis. Superimposed cholesteatoma formation within the left tympanomastoid cavity cannot be excluded. No evidence of malignant otitis externa.  - ENT consulted (9/20) for white purulent discharge from left ear, recc: ENT:  acetic acid drops BID to left ear. mupirocin ointment to the left pinna BID, cover with xeroform after placing mupirocin ointment. pressure offloading of the left ear.    # Oral Lesions with questionable Zoster vs Trauma?   - Patient with tongue pain and seen with anterior ulcerations consolidating and crusting and posterior ulceration.  - Case discussed with pathology resident and culture/ cytology sent.   - HSV negative and Path (9/6) with normal appearing epithelial cells singly and in clusters devoid of viral cytopathic effect.   - Continue on magic mouth wash for pain    RESPIRATORY  # AHHRF second to volume overload   - Hx of CKD and HFpEF presented with SOB and hypercarbia second to volume overload requiring intubation and HD initiation   - Vent weaning attempted however limited requiring tracheostomy (9/1) with IP   - Course complicated by PIPs elevated (50s) and respiratory acidosis second to secretions vs volume ?    - Vent adjusted 20/300/5/30 without improvement.   - POCUS (9/8) with BL pleural effusions (large on right and small on left)   - CT CHEST (9/8) with TBM, BL pleural effusions and continued consolidations   - Continue on vent and given TBM increased PEEP (9/9) to 20/300/8/40 with overall improvement   - Continue on Duoneb and HTS for airway clearance   - Continue volume removal with diuresis and aggressive UF sessions.   - Discussing possible thora but appears improved and will monitor.  ]  - Bedside US showing decrease in pleural effusion - hold off thoracentesis 9/10     GI  # UGIB   - CGE x 1 episode on 8/24 and melena x 2 episodes on 8/31 likely residual blood.   - EGD (8/31) found with esophagitis and erosive gastropathy  - Continue on PPI BID through late October and then PPI QD   - No melena     # Dysphagia   - NGT-TF continued   - Pending PEG however needs improvement in infectious status prior to placement.     RENAL  # Progressive CKD   - Presented volume overload and progressive RUSS on CKD (baseline CRE in 2s and mode into the 3s on admission) likely obstruction vs low flow state with shock vs AIN from drug reaction issue?  - POCUS with no signs of hydronephrosis   - Pressor support started with improvement in renal function  - Attempted steroid course in ICU without improvement in renal function   - Case discussed at length with renal and initiated on HD in ICU and now continued on HD TTHS, however remains volume overloaded.   - Patient oliguric however slightly responding to Bumex pushes - does not make enough urine   - Nephro following - Plan to resume HD TIW - Nephro recc holding HD alissa (9/21)   - Monitor renal function and UOP, and electrolytes for now  - Monitor urine output - Will perform bladder scan/straight cath as needed if retaining urine     Hyponatremia (improving) Na 123>>125  - c/w Salt tabs - s/p Hypertonic 1.5%NS @ 50cc/hr x 5 hr  - Renal- Will resume HD i/s/o of rising BUN, poor urine output on Bumex IVP     # Hyperphosphatemia (resolved)   - PTH normal and elevated phos likely from renal failure  - s/p Phos binder with Renvela - now d'beth as level wnl      INFECTIOUS DISEASE  # ESBL ECOLI and MSSA VAP c/b MSSA bacteremia   - RVP/ COVID (8/11) negative   - SCx (8/11) with MSSA s/p cefepime (8/12-8/13) and then ancef (8/14) however noted with drug reaction and then changed to vanco and aztreonam (8/12-8/13) in ICU .   - Course complicated by recurrent fevers and return of MSSA with bacteremia.   - SCx (9/1) with MSSA and ESBL ECOLI   - BAL (9/1) with MSSA   - BCx (9/1) with MSSA and cleared on (9/4)   - ECHO (9/6) unable to rule out endocarditis   - Continue on Bradford (9/4 - ) and Vanco BY DOSE POST HD (9/4, 9/7, 9/9 ...) with duration TBD at this time.   - Given inability to rule out endocarditis will discuss possible 4 wks with ID?   - Course complicated by fever (9/7) and UA with leuks but no bacteria, however compared to prior improved, RVP/COVID and SCx negative, and RPT CXR similar to prior   - LE DOPPLERS- Blood and sputum cx NTD; Acute left deep vein thrombosis of the left popliteal vein.  - Febrile overnight 102 recultured and sent off   -Pt receiving vanco post HD however today given vanco 750mg x 1 will check Vanco level at end of HD session and dose   - BCx (9/20): sent- NTD  - ID recc CT C/A/P w/ IV contrast - on hold d/t unsure if pt tolerate HD alissa  - C/w fluconazole 200 qd (9/21)    # Left ear OE   - ENT evaluation (9/7) with no mastoid tenderness, however found with positive swelling and excoriation to left auricle and tenderness to manipulation of auricle. Debrided crusting of auricle and EAC evaluated with edema and unable to visualize TM. EAC debrided and found with watery exudate.   - s/p CiproHC (9/5 - 9/16)     # MRSA PCRs   - MRSA PCR (8/11 and 8/14) negative   - Next MRSA + PCR ordered for 10/8     HEME  # Anemia second to GIB vs renal disease   - Hold chemical DVT PPX given bleeding/ waxing and waning HH   - s/p multiple units of PRBCs (8/15, 8/21, 8/28, 8/31 and 9/8)   - Anemia panel with AOCD but with low %sat and ferrous sulfate added to optimize   - Monitor HH and discuss with renal for EPO sometime next week.   - Spoke with GI for clearance to start Heparin gtt for + DVT   - restarted heparin gtt (9/21)    Vascular  # DVT  - Pt with + popliteal DVT on SCD Spoke with GI no absolute contraindication for starting Heparin - advise close monitoring for bleeding - Do stat CTA if bleeding occurs and call IR   - Pt specific heparin gtt started with goal PTT 60-85  - will monitor closely   - restarted heparin gtt (9/21)    ONC   # Hx of Breast CA   - Patient dx in 2018 and s/p BL mastectomy (radical on right) and chemo and RT.   - Supportive care.     ENDOCRINE  # IDDM2   - Continue on NPH 3U and ISS   - Monitor FS and adjust as needed     LINES/ TUBES  - R ARTHUR TAYLOR (8/19 - 9/21)     SKIN  # Drug Eruption   - Attempted cefepime (8/12) vs ancef (8/13-8/14) and then noted with drug rxn.   - Hold further cephalosporins at this time   - s/p Steroids with prednisone 40 (8/18 - 9/2) then prednisone 30 (9/3-9/7), then prednisone 20 (9/8 - 9/10), then prednisone 10mg (9/11-9/13) then turn off.     ETHICS/ GOC    - FULL CODE     DISPO - TBD

## 2023-09-23 NOTE — PROGRESS NOTE ADULT - SUBJECTIVE AND OBJECTIVE BOX
Patient is a 73y old  Female who presents with a chief complaint of Respiratory distress (23 Sep 2023 12:43)      SUBJECTIVE / OVERNIGHT EVENTS:    MEDICATIONS  (STANDING):  acetic acid 0.25% Topical Irrigation 1 Application(s) Topical two times a day  albuterol/ipratropium for Nebulization 3 milliLiter(s) Nebulizer every 6 hours  atorvastatin 40 milliGRAM(s) Oral at bedtime  buMETAnide Injectable 2 milliGRAM(s) IV Push two times a day  chlorhexidine 0.12% Liquid 15 milliLiter(s) Oral Mucosa every 12 hours  chlorhexidine 2% Cloths 1 Application(s) Topical daily  dextrose 5%. 1000 milliLiter(s) (50 mL/Hr) IV Continuous <Continuous>  dextrose 5%. 1000 milliLiter(s) (100 mL/Hr) IV Continuous <Continuous>  dextrose 50% Injectable 12.5 Gram(s) IV Push once  dextrose 50% Injectable 25 Gram(s) IV Push once  dextrose 50% Injectable 25 Gram(s) IV Push once  epoetin odalis (EPOGEN) Injectable 03637 Unit(s) SubCutaneous <User Schedule>  ferrous    sulfate Liquid 300 milliGRAM(s) Enteral Tube daily  fluconAZOLE IVPB 200 milliGRAM(s) IV Intermittent every 24 hours  glucagon  Injectable 1 milliGRAM(s) IntraMuscular once  heparin  Infusion 1050 Unit(s)/Hr (10.5 mL/Hr) IV Continuous <Continuous>  insulin lispro (ADMELOG) corrective regimen sliding scale   SubCutaneous every 6 hours  insulin NPH human recombinant 3 Unit(s) SubCutaneous every 6 hours  midodrine. 10 milliGRAM(s) Oral three times a day  mupirocin 2% Ointment 1 Application(s) Topical two times a day  pantoprazole  Injectable 40 milliGRAM(s) IV Push two times a day  psyllium Powder 1 Packet(s) Oral daily  sodium chloride 2 Gram(s) Oral two times a day    MEDICATIONS  (PRN):  acetaminophen   Oral Liquid .. 650 milliGRAM(s) Oral every 6 hours PRN Temp greater or equal to 38C (100.4F), Mild Pain (1 - 3)  calamine/zinc oxide Lotion 1 Application(s) Topical two times a day PRN Rash and/or Itching  dextrose Oral Gel 15 Gram(s) Oral once PRN Blood Glucose LESS THAN 70 milliGRAM(s)/deciliter      Vital Signs Last 24 Hrs  T(F): 99.1 (09-23-23 @ 17:24), Max: 99.1 (09-23-23 @ 17:24)  HR: 99 (09-23-23 @ 19:46) (96 - 111)  BP: 110/40 (09-23-23 @ 17:24) (91/38 - 127/95)  RR: 15 (09-23-23 @ 17:24) (15 - 15)  SpO2: 98% (09-23-23 @ 19:46) (96% - 100%)  Telemetry:   CAPILLARY BLOOD GLUCOSE      POCT Blood Glucose.: 186 mg/dL (23 Sep 2023 17:02)  POCT Blood Glucose.: 240 mg/dL (23 Sep 2023 11:29)  POCT Blood Glucose.: 251 mg/dL (23 Sep 2023 05:54)  POCT Blood Glucose.: 239 mg/dL (22 Sep 2023 23:34)    I&O's Summary    22 Sep 2023 07:01  -  23 Sep 2023 07:00  --------------------------------------------------------  IN: 546 mL / OUT: 200 mL / NET: 346 mL        PHYSICAL EXAM:  GENERAL: NAD, well-developed  HEAD:  Atraumatic, Normocephalic  EYES: EOMI, PERRLA, conjunctiva and sclera clear  NECK: Supple, No JVD  CHEST/LUNG: Clear to auscultation bilaterally; No wheeze  HEART: Regular rate and rhythm; No murmurs, rubs, or gallops  ABDOMEN: Soft, Nontender, Nondistended; Bowel sounds present  EXTREMITIES:  2+ Peripheral Pulses, No clubbing, cyanosis, or edema  PSYCH: AAOx3  NEUROLOGY: non-focal  SKIN: No rashes or lesions    LABS:                        8.1    4.66  )-----------( 201      ( 23 Sep 2023 01:05 )             25.6     09-23    125<L>  |  91<L>  |  97<H>  ----------------------------<  250<H>  4.2   |  25  |  2.55<H>    Ca    8.8      23 Sep 2023 01:05  Phos  3.7     09-23  Mg     2.30     09-23    TPro  5.1<L>  /  Alb  1.8<L>  /  TBili  0.2  /  DBili  x   /  AST  25  /  ALT  9   /  AlkPhos  339<H>  09-23    PTT - ( 23 Sep 2023 01:05 )  PTT:73.3 sec      Urinalysis Basic - ( 23 Sep 2023 01:05 )    Color: x / Appearance: x / SG: x / pH: x  Gluc: 250 mg/dL / Ketone: x  / Bili: x / Urobili: x   Blood: x / Protein: x / Nitrite: x   Leuk Esterase: x / RBC: x / WBC x   Sq Epi: x / Non Sq Epi: x / Bacteria: x        RADIOLOGY & ADDITIONAL TESTS:    Imaging Personally Reviewed:    Consultant(s) Notes Reviewed:      Care Discussed with Consultants/Other Providers:

## 2023-09-23 NOTE — PROGRESS NOTE ADULT - SUBJECTIVE AND OBJECTIVE BOX
NEPHROLOGY     Patient seen and examined with  at bedside, trach to ilene mitchell removed yesterday, in no acute distress.     MEDICATIONS  (STANDING):  acetic acid 0.25% Topical Irrigation 1 Application(s) Topical two times a day  albuterol/ipratropium for Nebulization 3 milliLiter(s) Nebulizer every 6 hours  atorvastatin 40 milliGRAM(s) Oral at bedtime  buMETAnide Injectable 2 milliGRAM(s) IV Push two times a day  chlorhexidine 0.12% Liquid 15 milliLiter(s) Oral Mucosa every 12 hours  chlorhexidine 2% Cloths 1 Application(s) Topical daily  dextrose 5%. 1000 milliLiter(s) (50 mL/Hr) IV Continuous <Continuous>  dextrose 5%. 1000 milliLiter(s) (100 mL/Hr) IV Continuous <Continuous>  dextrose 50% Injectable 12.5 Gram(s) IV Push once  dextrose 50% Injectable 25 Gram(s) IV Push once  dextrose 50% Injectable 25 Gram(s) IV Push once  doxazosin 6 milliGRAM(s) Oral <User Schedule>  epoetin odalis (EPOGEN) Injectable 34410 Unit(s) SubCutaneous <User Schedule>  ferrous    sulfate Liquid 300 milliGRAM(s) Enteral Tube daily  fluconAZOLE IVPB 200 milliGRAM(s) IV Intermittent every 24 hours  glucagon  Injectable 1 milliGRAM(s) IntraMuscular once  heparin  Infusion 1050 Unit(s)/Hr (10.5 mL/Hr) IV Continuous <Continuous>  insulin lispro (ADMELOG) corrective regimen sliding scale   SubCutaneous every 6 hours  insulin NPH human recombinant 3 Unit(s) SubCutaneous every 6 hours  meropenem  IVPB 500 milliGRAM(s) IV Intermittent every 12 hours  midodrine. 10 milliGRAM(s) Oral three times a day  mupirocin 2% Ointment 1 Application(s) Topical two times a day  pantoprazole  Injectable 40 milliGRAM(s) IV Push two times a day  psyllium Powder 1 Packet(s) Oral daily  sodium chloride 2 Gram(s) Oral two times a day  vancomycin  IVPB 750 milliGRAM(s) IV Intermittent once    VITALS:  T(C): , Max: 37.7 (09-21-23 @ 11:45)  T(F): , Max: 99.9 (09-21-23 @ 11:45)  HR: 104 (09-22-23 @ 10:06)  BP: 93/33 (09-22-23 @ 09:21)  RR: 17 (09-22-23 @ 09:21)  SpO2: 96% (09-22-23 @ 10:06)    I and O's:    09-21 @ 07:01  -  09-22 @ 07:00  --------------------------------------------------------  IN: 840 mL / OUT: 800 mL / NET: 40 mL    PHYSICAL EXAM:  Constitutional: trach to vent  HEENT: + tracheostomy, + NGT  Respiratory: Coarse BS  Cardiovascular: tachy s1s2  Gastrointestinal: BS+, soft, NT/ND  Extremities: + peripheral edema  Neurological: UTO  : +external catheter   Skin: No rashes    LABS:                        9.0    5.40  )-----------( 190      ( 22 Sep 2023 07:40 )             27.6     09-22    126<L>  |  94<L>  |  97<H>  ----------------------------<  210<H>  4.7   |  24  |  2.52<H>    Ca    8.8      22 Sep 2023 07:40  Phos  3.7     09-22  Mg     2.40     09-22    TPro  5.7<L>  /  Alb  1.8<L>  /  TBili  0.3  /  DBili  x   /  AST  33<H>  /  ALT  11  /  AlkPhos  342<H>  09-22    Urine Studies:  Urinalysis Basic - ( 22 Sep 2023 07:40 )    Color: x / Appearance: x / SG: x / pH: x  Gluc: 210 mg/dL / Ketone: x  / Bili: x / Urobili: x   Blood: x / Protein: x / Nitrite: x   Leuk Esterase: x / RBC: x / WBC x   Sq Epi: x / Non Sq Epi: x / Bacteria: x

## 2023-09-23 NOTE — PROGRESS NOTE ADULT - SUBJECTIVE AND OBJECTIVE BOX
CHIEF COMPLAINT: Patient is a 73y old Female who presents with a chief complaint of Respiratory distress.      Interval Events:      REVIEW OF SYSTEMS:  [ ] All other systems negative  [ ] Unable to assess ROS because ________      Mode: AC/ CMV (Assist Control/ Continuous Mandatory Ventilation), RR (machine): 15, TV (machine): 350, FiO2: 35, PEEP: 8, ITime: 0.78, MAP: 14, PIP: 44      OBJECTIVE:  ICU Vital Signs Last 24 Hrs  T(C): 37.2 (23 Sep 2023 04:00), Max: 37.4 (22 Sep 2023 09:21)  T(F): 99 (23 Sep 2023 04:00), Max: 99.3 (22 Sep 2023 09:21)  HR: 111 (23 Sep 2023 04:00) (96 - 111)  BP: 91/38 (23 Sep 2023 05:00) (89/45 - 107/62)  BP(mean): 73 (23 Sep 2023 04:00) (73 - 73)  RR: 15 (23 Sep 2023 04:00) (15 - 17)  SpO2: 100% (23 Sep 2023 04:00) (95% - 100%)    O2 Parameters below as of 23 Sep 2023 04:00  Patient On (Oxygen Delivery Method): ventilator          Mode: AC/ CMV (Assist Control/ Continuous Mandatory Ventilation), RR (machine): 15, TV (machine): 350, FiO2: 35, PEEP: 8, ITime: 0.78, MAP: 14, PIP: 44    09-22 @ 07:01  -  09-23 @ 07:00  --------------------------------------------------------  IN: 546 mL / OUT: 200 mL / NET: 346 mL      CAPILLARY BLOOD GLUCOSE      POCT Blood Glucose.: 251 mg/dL (23 Sep 2023 05:54)      HOSPITAL MEDICATIONS:  MEDICATIONS  (STANDING):  acetic acid 0.25% Topical Irrigation 1 Application(s) Topical two times a day  albuterol/ipratropium for Nebulization 3 milliLiter(s) Nebulizer every 6 hours  atorvastatin 40 milliGRAM(s) Oral at bedtime  buMETAnide Injectable 2 milliGRAM(s) IV Push two times a day  chlorhexidine 0.12% Liquid 15 milliLiter(s) Oral Mucosa every 12 hours  chlorhexidine 2% Cloths 1 Application(s) Topical daily  dextrose 5%. 1000 milliLiter(s) (50 mL/Hr) IV Continuous <Continuous>  dextrose 5%. 1000 milliLiter(s) (100 mL/Hr) IV Continuous <Continuous>  dextrose 50% Injectable 12.5 Gram(s) IV Push once  dextrose 50% Injectable 25 Gram(s) IV Push once  dextrose 50% Injectable 25 Gram(s) IV Push once  epoetin odalis (EPOGEN) Injectable 68232 Unit(s) SubCutaneous <User Schedule>  ferrous    sulfate Liquid 300 milliGRAM(s) Enteral Tube daily  fluconAZOLE IVPB 200 milliGRAM(s) IV Intermittent every 24 hours  glucagon  Injectable 1 milliGRAM(s) IntraMuscular once  heparin  Infusion 1050 Unit(s)/Hr (10.5 mL/Hr) IV Continuous <Continuous>  insulin lispro (ADMELOG) corrective regimen sliding scale   SubCutaneous every 6 hours  insulin NPH human recombinant 3 Unit(s) SubCutaneous every 6 hours  midodrine. 10 milliGRAM(s) Oral three times a day  mupirocin 2% Ointment 1 Application(s) Topical two times a day  pantoprazole  Injectable 40 milliGRAM(s) IV Push two times a day  psyllium Powder 1 Packet(s) Oral daily  sodium chloride 2 Gram(s) Oral two times a day    MEDICATIONS  (PRN):  acetaminophen   Oral Liquid .. 650 milliGRAM(s) Oral every 6 hours PRN Temp greater or equal to 38C (100.4F), Mild Pain (1 - 3)  calamine/zinc oxide Lotion 1 Application(s) Topical two times a day PRN Rash and/or Itching  dextrose Oral Gel 15 Gram(s) Oral once PRN Blood Glucose LESS THAN 70 milliGRAM(s)/deciliter      LABS:                        8.1    4.66  )-----------( 201      ( 23 Sep 2023 01:05 )             25.6     09-23    125<L>  |  91<L>  |  97<H>  ----------------------------<  250<H>  4.2   |  25  |  2.55<H>    Ca    8.8      23 Sep 2023 01:05  Phos  3.7     09-23  Mg     2.30     09-23    TPro  5.1<L>  /  Alb  1.8<L>  /  TBili  0.2  /  DBili  x   /  AST  25  /  ALT  9   /  AlkPhos  339<H>  09-23    PTT - ( 23 Sep 2023 01:05 )  PTT:73.3 sec  Urinalysis Basic - ( 23 Sep 2023 01:05 )    Color: x / Appearance: x / SG: x / pH: x  Gluc: 250 mg/dL / Ketone: x  / Bili: x / Urobili: x   Blood: x / Protein: x / Nitrite: x   Leuk Esterase: x / RBC: x / WBC x   Sq Epi: x / Non Sq Epi: x / Bacteria: x      PAST MEDICAL & SURGICAL HISTORY:  Breast CA      Diabetes      Stroke      Cardiac arrest      HTN (hypertension)      H/O mastectomy, bilateral      FAMILY HISTORY:  No pertinent family history in first degree relatives      Social History:      RADIOLOGY:  [ ] Reviewed and interpreted by me      PULMONARY FUNCTION TESTS:      EKG: CHIEF COMPLAINT: Patient is a 73y old Female who presents with a chief complaint of Respiratory distress.      Interval Events: Sodium level plateaued with slight uptrend in creatinine. Plan to hold off on HD for now.      REVIEW OF SYSTEMS:  [x] Unable to assess ROS because vented.      Mode: AC/ CMV (Assist Control/ Continuous Mandatory Ventilation), RR (machine): 15, TV (machine): 350, FiO2: 35, PEEP: 8, ITime: 0.78, MAP: 14, PIP: 44      OBJECTIVE:  ICU Vital Signs Last 24 Hrs  T(C): 37.2 (23 Sep 2023 04:00), Max: 37.4 (22 Sep 2023 09:21)  T(F): 99 (23 Sep 2023 04:00), Max: 99.3 (22 Sep 2023 09:21)  HR: 111 (23 Sep 2023 04:00) (96 - 111)  BP: 91/38 (23 Sep 2023 05:00) (89/45 - 107/62)  BP(mean): 73 (23 Sep 2023 04:00) (73 - 73)  RR: 15 (23 Sep 2023 04:00) (15 - 17)  SpO2: 100% (23 Sep 2023 04:00) (95% - 100%)    O2 Parameters below as of 23 Sep 2023 04:00  Patient On (Oxygen Delivery Method): ventilator        Mode: AC/ CMV (Assist Control/ Continuous Mandatory Ventilation), RR (machine): 15, TV (machine): 350, FiO2: 35, PEEP: 8, ITime: 0.78, MAP: 14, PIP: 44    09-22 @ 07:01  -  09-23 @ 07:00  --------------------------------------------------------  IN: 546 mL / OUT: 200 mL / NET: 346 mL      CAPILLARY BLOOD GLUCOSE      POCT Blood Glucose.: 251 mg/dL (23 Sep 2023 05:54)      HOSPITAL MEDICATIONS:  MEDICATIONS  (STANDING):  acetic acid 0.25% Topical Irrigation 1 Application(s) Topical two times a day  albuterol/ipratropium for Nebulization 3 milliLiter(s) Nebulizer every 6 hours  atorvastatin 40 milliGRAM(s) Oral at bedtime  buMETAnide Injectable 2 milliGRAM(s) IV Push two times a day  chlorhexidine 0.12% Liquid 15 milliLiter(s) Oral Mucosa every 12 hours  chlorhexidine 2% Cloths 1 Application(s) Topical daily  dextrose 5%. 1000 milliLiter(s) (50 mL/Hr) IV Continuous <Continuous>  dextrose 5%. 1000 milliLiter(s) (100 mL/Hr) IV Continuous <Continuous>  dextrose 50% Injectable 12.5 Gram(s) IV Push once  dextrose 50% Injectable 25 Gram(s) IV Push once  dextrose 50% Injectable 25 Gram(s) IV Push once  epoetin odalis (EPOGEN) Injectable 17045 Unit(s) SubCutaneous <User Schedule>  ferrous    sulfate Liquid 300 milliGRAM(s) Enteral Tube daily  fluconAZOLE IVPB 200 milliGRAM(s) IV Intermittent every 24 hours  glucagon  Injectable 1 milliGRAM(s) IntraMuscular once  heparin  Infusion 1050 Unit(s)/Hr (10.5 mL/Hr) IV Continuous <Continuous>  insulin lispro (ADMELOG) corrective regimen sliding scale   SubCutaneous every 6 hours  insulin NPH human recombinant 3 Unit(s) SubCutaneous every 6 hours  midodrine. 10 milliGRAM(s) Oral three times a day  mupirocin 2% Ointment 1 Application(s) Topical two times a day  pantoprazole  Injectable 40 milliGRAM(s) IV Push two times a day  psyllium Powder 1 Packet(s) Oral daily  sodium chloride 2 Gram(s) Oral two times a day    MEDICATIONS  (PRN):  acetaminophen   Oral Liquid .. 650 milliGRAM(s) Oral every 6 hours PRN Temp greater or equal to 38C (100.4F), Mild Pain (1 - 3)  calamine/zinc oxide Lotion 1 Application(s) Topical two times a day PRN Rash and/or Itching  dextrose Oral Gel 15 Gram(s) Oral once PRN Blood Glucose LESS THAN 70 milliGRAM(s)/deciliter      LABS:                        8.1    4.66  )-----------( 201      ( 23 Sep 2023 01:05 )             25.6     09-23    125<L>  |  91<L>  |  97<H>  ----------------------------<  250<H>  4.2   |  25  |  2.55<H>    Ca    8.8      23 Sep 2023 01:05  Phos  3.7     09-23  Mg     2.30     09-23    TPro  5.1<L>  /  Alb  1.8<L>  /  TBili  0.2  /  DBili  x   /  AST  25  /  ALT  9   /  AlkPhos  339<H>  09-23    PTT - ( 23 Sep 2023 01:05 )  PTT:73.3 sec  Urinalysis Basic - ( 23 Sep 2023 01:05 )    Color: x / Appearance: x / SG: x / pH: x  Gluc: 250 mg/dL / Ketone: x  / Bili: x / Urobili: x   Blood: x / Protein: x / Nitrite: x   Leuk Esterase: x / RBC: x / WBC x   Sq Epi: x / Non Sq Epi: x / Bacteria: x      PAST MEDICAL & SURGICAL HISTORY:  Breast CA      Diabetes      Stroke      Cardiac arrest      HTN (hypertension)      H/O mastectomy, bilateral      FAMILY HISTORY:  No pertinent family history in first degree relatives      Social History:      RADIOLOGY:  [ ] Reviewed and interpreted by me      PULMONARY FUNCTION TESTS:      EKG:

## 2023-09-23 NOTE — PROGRESS NOTE ADULT - SUBJECTIVE AND OBJECTIVE BOX
Subjective: Patient seen and examined. No new events except as noted.   Remains in RCU.     REVIEW OF SYSTEMS:  Unable to obtain.    MEDICATIONS:  MEDICATIONS  (STANDING):  acetic acid 0.25% Topical Irrigation 1 Application(s) Topical two times a day  albuterol/ipratropium for Nebulization 3 milliLiter(s) Nebulizer every 6 hours  atorvastatin 40 milliGRAM(s) Oral at bedtime  buMETAnide Injectable 2 milliGRAM(s) IV Push two times a day  chlorhexidine 0.12% Liquid 15 milliLiter(s) Oral Mucosa every 12 hours  chlorhexidine 2% Cloths 1 Application(s) Topical daily  dextrose 5%. 1000 milliLiter(s) (50 mL/Hr) IV Continuous <Continuous>  dextrose 5%. 1000 milliLiter(s) (100 mL/Hr) IV Continuous <Continuous>  dextrose 50% Injectable 12.5 Gram(s) IV Push once  dextrose 50% Injectable 25 Gram(s) IV Push once  dextrose 50% Injectable 25 Gram(s) IV Push once  doxazosin 6 milliGRAM(s) Oral <User Schedule>  epoetin odalis (EPOGEN) Injectable 62276 Unit(s) SubCutaneous <User Schedule>  ferrous    sulfate Liquid 300 milliGRAM(s) Enteral Tube daily  fluconAZOLE IVPB 200 milliGRAM(s) IV Intermittent every 24 hours  glucagon  Injectable 1 milliGRAM(s) IntraMuscular once  heparin  Infusion 1050 Unit(s)/Hr (10.5 mL/Hr) IV Continuous <Continuous>  insulin lispro (ADMELOG) corrective regimen sliding scale   SubCutaneous every 6 hours  insulin NPH human recombinant 3 Unit(s) SubCutaneous every 6 hours  meropenem  IVPB 500 milliGRAM(s) IV Intermittent every 12 hours  midodrine. 10 milliGRAM(s) Oral three times a day  mupirocin 2% Ointment 1 Application(s) Topical two times a day  pantoprazole  Injectable 40 milliGRAM(s) IV Push two times a day  psyllium Powder 1 Packet(s) Oral daily  sodium chloride 2 Gram(s) Oral two times a day  vancomycin  IVPB 750 milliGRAM(s) IV Intermittent once    PHYSICAL EXAM:  Vital Signs Last 24 Hrs  T(C): 37.2 (23 Sep 2023 04:00), Max: 37.4 (22 Sep 2023 09:21)  T(F): 99 (23 Sep 2023 04:00), Max: 99.3 (22 Sep 2023 09:21)  HR: 111 (23 Sep 2023 04:00) (96 - 111)  BP: 107/62 (23 Sep 2023 04:00) (89/45 - 107/62)  BP(mean): 73 (23 Sep 2023 04:00) (73 - 73)  RR: 15 (23 Sep 2023 04:00) (15 - 17)  SpO2: 100% (23 Sep 2023 04:00) (95% - 100%)    Parameters below as of 23 Sep 2023 04:00  Patient On (Oxygen Delivery Method): ventilator    I&O's Summary    21 Sep 2023 07:01  -  22 Sep 2023 07:00  --------------------------------------------------------  IN: 840 mL / OUT: 800 mL / NET: 40 mL    22 Sep 2023 07:01  -  23 Sep 2023 05:22  --------------------------------------------------------  IN: 318.5 mL / OUT: 200 mL / NET: 118.5 mL    Appearance: NAD, +trach  HEENT: dry oral mucosa  Lymphatic: No lymphadenopathy  Cardiovascular: Normal S1 S2, No JVD, No murmurs, No edema  Respiratory: Decreased BS, + trach to vent	  Neuro: opens eyes  Gastrointestinal: Soft, Non-tender, + BS, + NGT  Skin: No rashes, No ecchymoses, No cyanosis	  Extremities: No strength/ROM 2/2 sedation, + BL LE edema  Vascular: Peripheral pulses palpable 2+ bilaterally    Mode: AC/ CMV (Assist Control/ Continuous Mandatory Ventilation), RR (machine): 17, TV (machine): 340, FiO2: 35, PEEP: 8, ITime: 0.84, MAP: 15, PIP: 46    LABS:    CARDIAC MARKERS:                        8.1    4.66  )-----------( 201      ( 23 Sep 2023 01:05 )             25.6     09-23    125<L>  |  91<L>  |  97<H>  ----------------------------<  250<H>  4.2   |  25  |  2.55<H>    Ca    8.8      23 Sep 2023 01:05  Phos  3.7     09-23  Mg     2.30     09-23    TPro  5.1<L>  /  Alb  1.8<L>  /  TBili  0.2  /  DBili  x   /  AST  25  /  ALT  9   /  AlkPhos  339<H>  09-23    proBNP:   Lipid Profile:   HgA1c:   TSH:     TELEMETRY: 	    ECG:  	  RADIOLOGY:   DIAGNOSTIC TESTING:  [ ] Echocardiogram:  [ ]  Catheterization:  [ ] Stress Test:    OTHER:  Subjective: Patient seen and examined. No new events except as noted.   Hypotensive requiring midodrine.  Remains in RCU.     REVIEW OF SYSTEMS:  Unable to obtain.    MEDICATIONS:  MEDICATIONS  (STANDING):  acetic acid 0.25% Topical Irrigation 1 Application(s) Topical two times a day  albuterol/ipratropium for Nebulization 3 milliLiter(s) Nebulizer every 6 hours  atorvastatin 40 milliGRAM(s) Oral at bedtime  buMETAnide Injectable 2 milliGRAM(s) IV Push two times a day  chlorhexidine 0.12% Liquid 15 milliLiter(s) Oral Mucosa every 12 hours  chlorhexidine 2% Cloths 1 Application(s) Topical daily  dextrose 5%. 1000 milliLiter(s) (50 mL/Hr) IV Continuous <Continuous>  dextrose 5%. 1000 milliLiter(s) (100 mL/Hr) IV Continuous <Continuous>  dextrose 50% Injectable 12.5 Gram(s) IV Push once  dextrose 50% Injectable 25 Gram(s) IV Push once  dextrose 50% Injectable 25 Gram(s) IV Push once  doxazosin 6 milliGRAM(s) Oral <User Schedule>  epoetin odalis (EPOGEN) Injectable 05832 Unit(s) SubCutaneous <User Schedule>  ferrous    sulfate Liquid 300 milliGRAM(s) Enteral Tube daily  fluconAZOLE IVPB 200 milliGRAM(s) IV Intermittent every 24 hours  glucagon  Injectable 1 milliGRAM(s) IntraMuscular once  heparin  Infusion 1050 Unit(s)/Hr (10.5 mL/Hr) IV Continuous <Continuous>  insulin lispro (ADMELOG) corrective regimen sliding scale   SubCutaneous every 6 hours  insulin NPH human recombinant 3 Unit(s) SubCutaneous every 6 hours  meropenem  IVPB 500 milliGRAM(s) IV Intermittent every 12 hours  midodrine. 10 milliGRAM(s) Oral three times a day  mupirocin 2% Ointment 1 Application(s) Topical two times a day  pantoprazole  Injectable 40 milliGRAM(s) IV Push two times a day  psyllium Powder 1 Packet(s) Oral daily  sodium chloride 2 Gram(s) Oral two times a day  vancomycin  IVPB 750 milliGRAM(s) IV Intermittent once    PHYSICAL EXAM:  Vital Signs Last 24 Hrs  T(C): 37.2 (23 Sep 2023 04:00), Max: 37.4 (22 Sep 2023 09:21)  T(F): 99 (23 Sep 2023 04:00), Max: 99.3 (22 Sep 2023 09:21)  HR: 111 (23 Sep 2023 04:00) (96 - 111)  BP: 107/62 (23 Sep 2023 04:00) (89/45 - 107/62)  BP(mean): 73 (23 Sep 2023 04:00) (73 - 73)  RR: 15 (23 Sep 2023 04:00) (15 - 17)  SpO2: 100% (23 Sep 2023 04:00) (95% - 100%)    Parameters below as of 23 Sep 2023 04:00  Patient On (Oxygen Delivery Method): ventilator    I&O's Summary    21 Sep 2023 07:01  -  22 Sep 2023 07:00  --------------------------------------------------------  IN: 840 mL / OUT: 800 mL / NET: 40 mL    22 Sep 2023 07:01  -  23 Sep 2023 05:22  --------------------------------------------------------  IN: 318.5 mL / OUT: 200 mL / NET: 118.5 mL    Appearance: NAD, +trach  HEENT: dry oral mucosa  Lymphatic: No lymphadenopathy  Cardiovascular: Normal S1 S2, No JVD, No murmurs, No edema  Respiratory: Decreased BS, + trach to vent	  Neuro: opens eyes  Gastrointestinal: Soft, Non-tender, + BS, + NGT  Skin: No rashes, No ecchymoses, No cyanosis	  Extremities: No strength/ROM 2/2 sedation, + BL LE edema  Vascular: Peripheral pulses palpable 2+ bilaterally    Mode: AC/ CMV (Assist Control/ Continuous Mandatory Ventilation), RR (machine): 17, TV (machine): 340, FiO2: 35, PEEP: 8, ITime: 0.84, MAP: 15, PIP: 46    LABS:    CARDIAC MARKERS:                        8.1    4.66  )-----------( 201      ( 23 Sep 2023 01:05 )             25.6     09-23    125<L>  |  91<L>  |  97<H>  ----------------------------<  250<H>  4.2   |  25  |  2.55<H>    Ca    8.8      23 Sep 2023 01:05  Phos  3.7     09-23  Mg     2.30     09-23    TPro  5.1<L>  /  Alb  1.8<L>  /  TBili  0.2  /  DBili  x   /  AST  25  /  ALT  9   /  AlkPhos  339<H>  09-23    proBNP:   Lipid Profile:   HgA1c:   TSH:     TELEMETRY: 	    ECG:  	  RADIOLOGY:   DIAGNOSTIC TESTING:  [ ] Echocardiogram:  [ ]  Catheterization:  [ ] Stress Test:    OTHER:

## 2023-09-23 NOTE — PROGRESS NOTE ADULT - ASSESSMENT
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 ?uremia of late; we attempted HD the other day as well as the day prior from the shiley that has been in place for the past few weeks; the catheter was not able to be used successfully.     (3)Hyponatremia - low but slowly improving/stable, on NaCl tabs    (4)Pulm- hypercapnic respiratory failure on admission - now s/p trach; remains on vent     (5)ID - on IV Meropenem    (6)Anemia - receiving DAVID and PRBCs intermittently    (7)AMS - at least at times over the past 2-3 days, she has been mentating well; this argues against uremic encephalopathy      RECOMMEND:  (1) attempting efforts to further forgo HD; if we are to re-perform HD, would plan for new shiley placement  (2)NaCl tabs as ordered          [facility]  [facility]  Office: [phone]         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 ?uremia of late; we attempted HD the other day as well as the day prior from the shiley that has been in place for the past few weeks; the catheter was not able to be used successfully.     (3)Hyponatremia - low but slowly improving/stable, on NaCl tabs    (4)Pulm- hypercapnic respiratory failure on admission - now s/p trach; remains on vent     (5)ID - on IV Meropenem    (6)Anemia - receiving DAVID and PRBCs intermittently    (7)AMS - at least at times over the past 2-3 days, she has been mentating well; this argues against uremic encephalopathy      RECOMMEND:  (1)no dialysis was done yesterday, shiely was removed   - attempting efforts to further forgo HD; if we are to re-perform HD, would plan for new shiley placement  (2)NaCl tabs as ordered  (3) monitor I/O         Rachaelkorin Cali  [facility]  Office: [phone]

## 2023-09-24 NOTE — PROGRESS NOTE ADULT - SUBJECTIVE AND OBJECTIVE BOX
Patient is a 73y old  Female who presents with a chief complaint of Respiratory distress (24 Sep 2023 11:40)      SUBJECTIVE / OVERNIGHT EVENTS: no new events    MEDICATIONS  (STANDING):  acetic acid 0.25% Topical Irrigation 1 Application(s) Topical two times a day  albuterol/ipratropium for Nebulization 3 milliLiter(s) Nebulizer every 6 hours  atorvastatin 40 milliGRAM(s) Oral at bedtime  buMETAnide Injectable 2 milliGRAM(s) IV Push two times a day  chlorhexidine 0.12% Liquid 15 milliLiter(s) Oral Mucosa every 12 hours  chlorhexidine 2% Cloths 1 Application(s) Topical daily  dextrose 5%. 1000 milliLiter(s) (50 mL/Hr) IV Continuous <Continuous>  dextrose 5%. 1000 milliLiter(s) (100 mL/Hr) IV Continuous <Continuous>  dextrose 50% Injectable 12.5 Gram(s) IV Push once  dextrose 50% Injectable 25 Gram(s) IV Push once  dextrose 50% Injectable 25 Gram(s) IV Push once  epoetin odalis (EPOGEN) Injectable 38701 Unit(s) SubCutaneous <User Schedule>  ferrous    sulfate Liquid 300 milliGRAM(s) Enteral Tube daily  fluconAZOLE IVPB 200 milliGRAM(s) IV Intermittent every 24 hours  glucagon  Injectable 1 milliGRAM(s) IntraMuscular once  heparin  Infusion 1050 Unit(s)/Hr (10.5 mL/Hr) IV Continuous <Continuous>  insulin lispro (ADMELOG) corrective regimen sliding scale   SubCutaneous every 6 hours  insulin NPH human recombinant 3 Unit(s) SubCutaneous every 6 hours  midodrine. 10 milliGRAM(s) Oral three times a day  mupirocin 2% Ointment 1 Application(s) Topical two times a day  pantoprazole  Injectable 40 milliGRAM(s) IV Push two times a day  psyllium Powder 1 Packet(s) Oral daily  sodium chloride 2 Gram(s) Oral two times a day    MEDICATIONS  (PRN):  acetaminophen   Oral Liquid .. 650 milliGRAM(s) Oral every 6 hours PRN Temp greater or equal to 38C (100.4F), Mild Pain (1 - 3)  artificial tears (preservative free) Ophthalmic Solution 1 Drop(s) Both EYES three times a day PRN Dry Eyes  calamine/zinc oxide Lotion 1 Application(s) Topical two times a day PRN Rash and/or Itching  dextrose Oral Gel 15 Gram(s) Oral once PRN Blood Glucose LESS THAN 70 milliGRAM(s)/deciliter      Vital Signs Last 24 Hrs  T(F): 97.8 (09-24-23 @ 14:00), Max: 97.8 (09-24-23 @ 14:00)  HR: 100 (09-24-23 @ 15:23) (88 - 109)  BP: 101/38 (09-24-23 @ 14:00) (101/38 - 109/25)  RR: 18 (09-24-23 @ 14:00) (14 - 18)  SpO2: 97% (09-24-23 @ 15:23) (97% - 100%)  Telemetry:   CAPILLARY BLOOD GLUCOSE      POCT Blood Glucose.: 179 mg/dL (24 Sep 2023 16:51)  POCT Blood Glucose.: 212 mg/dL (24 Sep 2023 11:29)  POCT Blood Glucose.: 197 mg/dL (24 Sep 2023 06:13)  POCT Blood Glucose.: 210 mg/dL (23 Sep 2023 23:57)    I&O's Summary    23 Sep 2023 07:01  -  24 Sep 2023 07:00  --------------------------------------------------------  IN: 464 mL / OUT: 100 mL / NET: 364 mL        PHYSICAL EXAM:  GENERAL: NAD, well-developed  HEAD:  Atraumatic, Normocephalic  EYES: EOMI, PERRLA, conjunctiva and sclera clear  NECK: Supple, No JVD  CHEST/LUNG: Clear to auscultation bilaterally; No wheeze  HEART: Regular rate and rhythm; No murmurs, rubs, or gallops  ABDOMEN: Soft, Nontender, Nondistended; Bowel sounds present  EXTREMITIES:  2+ Peripheral Pulses, No clubbing, cyanosis, or edema  PSYCH: AAOx3  NEUROLOGY: non-focal  SKIN: No rashes or lesions    LABS:                        7.7    5.41  )-----------( 200      ( 24 Sep 2023 06:00 )             24.7     09-24    127<L>  |  91<L>  |  104<H>  ----------------------------<  184<H>  4.4   |  23  |  2.66<H>    Ca    8.9      24 Sep 2023 06:00  Phos  3.9     09-24  Mg     2.40     09-24    TPro  5.4<L>  /  Alb  1.6<L>  /  TBili  <0.2  /  DBili  x   /  AST  31  /  ALT  8   /  AlkPhos  326<H>  09-24    PTT - ( 23 Sep 2023 01:05 )  PTT:73.3 sec      Urinalysis Basic - ( 24 Sep 2023 06:00 )    Color: x / Appearance: x / SG: x / pH: x  Gluc: 184 mg/dL / Ketone: x  / Bili: x / Urobili: x   Blood: x / Protein: x / Nitrite: x   Leuk Esterase: x / RBC: x / WBC x   Sq Epi: x / Non Sq Epi: x / Bacteria: x        RADIOLOGY & ADDITIONAL TESTS:    Imaging Personally Reviewed:    Consultant(s) Notes Reviewed:      Care Discussed with Consultants/Other Providers:

## 2023-09-24 NOTE — PROGRESS NOTE ADULT - SUBJECTIVE AND OBJECTIVE BOX
NEPHROLOGY     Patient seen and examined with  at bedside, pt trach to vent, in no acute distress.     MEDICATIONS  (STANDING):  acetic acid 0.25% Topical Irrigation 1 Application(s) Topical two times a day  albuterol/ipratropium for Nebulization 3 milliLiter(s) Nebulizer every 6 hours  atorvastatin 40 milliGRAM(s) Oral at bedtime  buMETAnide Injectable 2 milliGRAM(s) IV Push two times a day  chlorhexidine 0.12% Liquid 15 milliLiter(s) Oral Mucosa every 12 hours  chlorhexidine 2% Cloths 1 Application(s) Topical daily  dextrose 5%. 1000 milliLiter(s) (50 mL/Hr) IV Continuous <Continuous>  dextrose 5%. 1000 milliLiter(s) (100 mL/Hr) IV Continuous <Continuous>  dextrose 50% Injectable 12.5 Gram(s) IV Push once  dextrose 50% Injectable 25 Gram(s) IV Push once  dextrose 50% Injectable 25 Gram(s) IV Push once  epoetin odalis (EPOGEN) Injectable 35625 Unit(s) SubCutaneous <User Schedule>  ferrous    sulfate Liquid 300 milliGRAM(s) Enteral Tube daily  fluconAZOLE IVPB 200 milliGRAM(s) IV Intermittent every 24 hours  glucagon  Injectable 1 milliGRAM(s) IntraMuscular once  heparin  Infusion 1050 Unit(s)/Hr (10.5 mL/Hr) IV Continuous <Continuous>  insulin lispro (ADMELOG) corrective regimen sliding scale   SubCutaneous every 6 hours  insulin NPH human recombinant 3 Unit(s) SubCutaneous every 6 hours  midodrine. 10 milliGRAM(s) Oral three times a day  mupirocin 2% Ointment 1 Application(s) Topical two times a day  pantoprazole  Injectable 40 milliGRAM(s) IV Push two times a day  psyllium Powder 1 Packet(s) Oral daily  sodium chloride 2 Gram(s) Oral two times a day    VITALS:  T(C): , Max: 37.3 (09-23-23 @ 17:24)  T(F): , Max: 99.1 (09-23-23 @ 17:24)  HR: 107 (09-24-23 @ 10:50)  BP: 106/28 (09-24-23 @ 04:00)  BP(mean): 46 (09-24-23 @ 04:00)  RR: 14 (09-24-23 @ 04:00)  SpO2: 98% (09-24-23 @ 10:50)    I and O's:    09-23 @ 07:01  -  09-24 @ 07:00  --------------------------------------------------------  IN: 464 mL / OUT: 100 mL / NET: 364 mL    PHYSICAL EXAM:  Constitutional: trach to vent  HEENT: + tracheostomy, + NGT  Respiratory: Coarse BS  Cardiovascular: tachy s1s2  Gastrointestinal: BS+, soft, NT/ND  Extremities: + peripheral edema  Neurological: UTO  : +external catheter   Skin: No rashes    LABS:                        7.7    5.41  )-----------( 200      ( 24 Sep 2023 06:00 )             24.7     09-24    127<L>  |  91<L>  |  104<H>  ----------------------------<  184<H>  4.4   |  23  |  2.66<H>    Ca    8.9      24 Sep 2023 06:00  Phos  3.9     09-24  Mg     2.40     09-24    TPro  5.4<L>  /  Alb  1.6<L>  /  TBili  <0.2  /  DBili  x   /  AST  31  /  ALT  8   /  AlkPhos  326<H>  09-24    Urine Studies:  Urinalysis Basic - ( 24 Sep 2023 06:00 )    Color: x / Appearance: x / SG: x / pH: x  Gluc: 184 mg/dL / Ketone: x  / Bili: x / Urobili: x   Blood: x / Protein: x / Nitrite: x   Leuk Esterase: x / RBC: x / WBC x   Sq Epi: x / Non Sq Epi: x / Bacteria: x

## 2023-09-24 NOTE — PROGRESS NOTE ADULT - ASSESSMENT
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 While in MICU patient noted with worsening renal failure requiring HD initiation, CGE/ Melena s/p EGD 8/31 found with esophagitis and erosive gastropathy, new right cerebellar infarct and fevers second to ESBL COLI and MSSA VAP, MSSA bacteremia, left ear otitis externa and drug rxn to cephalosporins Course further complicated by prolonged vent time s/p tracheostomy 9/1 and transferred to RCU 9/3 completed by recurrent fevers with high PIP, respiratory acidosis and concern for volume overloaded.     NEUROLOGY  # Metabolic Encephalopath vs NEW cerebella infarct   - Baseline MS AOX3 with short term memory changes per family however noted with concern for poor mentation in ICU  - MRI (8/17) with NEW right cerebellar infarct and old left PCA/occipital infarcts  - STEPHANIE (8/17) with AV showing two linear mobile echogenic elements attached to valve leaflets consistent with Lambel's excrescence, but no TRINITY thrombus, and lambel's excrescence with uncertain clinical implication.   - EEG (8/11-8/15) with no seizures   - ILR interrogation (9/7) with SR and PACs falsely recorded as AF.   - Attempted to resume ASA for medical management but held given GIB   - Continue on Lipitor for medical management   - Course complicated by questionable head and body shaking 9/8 however prolactin elevated and shakes head yes/no to some questions.   - Lethargy noted    CARDIOVASCULAR  # HTN Urgency   # Septic vs vasoplegic shock   - Labile BPs ranging in between SBP 80s-200s and attempted labetalol, however noted with hypotension and bradycardia likely from BB toxicity vs shock state?    - Holding Labetalol and Catapres   - c/w Cardura for now   - Hypotensive in the 80s systolic overnight, requiring Midodrine 20mg x 1 (resolved)   - Stable today, still mildly hypotensive, but no intervention at this time     # Hx of HFpEF with likely ADHF with volume overload.   - ECHO (7/2023) with EF 59 with severe LVH and diastolic dysfunction   - STEPHANIE (8/18) with normal LVRVSF however noted with increased LA pressures and persistent LA septal bowing into RA.   - ECHO (8/11) with EF 60 with mild LVH, normal LVRVSF, and mild ddfxn.  - Remove volume with HD sessions and intermittent bumex pushes as BP allows    - c/w Bumex 2mg IV BID for now - Monitor UOP and electrolytes. Intermittently held due to low BP    HEENT   # Left ear OE   - ENT evaluation (9/7) with no mastoid tenderness, however found with positive swelling and excoriation to left auricle and tenderness to manipulation of auricle. Debrided crusting of auricle and EAC evaluated with edema and unable to visualize TM. EAC debrided and found with watery exudate.   - s/p CiproHC (9/5 - 9/16)   - Bradford discontinued. Rash on torso concerning for possible drug rash  - ENT following and ear wick change QD   - Wick out but needs ? Debridement wound care called/fu ent   - ENT recommending CT IAC w/ IV con but family is refusing as concerned given CKD, kidneys wouldn't recover from IV contrast. Spoke with Nephrology, ENT, and patient's  at length. Planned for CT non con and per , decision will be made from there but for now doesn't want to risk further harming kidneys.  - CT IAC showing acute on chronic left-sided otitis externa and acute on chronic left-sided otomastoiditis. Superimposed left-sided tympanosclerosis. Superimposed cholesteatoma formation within the left tympanomastoid cavity cannot be excluded. No evidence of malignant otitis externa.  - ENT consulted (9/20) for white purulent discharge from left ear, recc: ENT:  acetic acid drops BID to left ear. mupirocin ointment to the left pinna BID, cover with xeroform after placing mupirocin ointment. pressure offloading of the left ear.    # Oral Lesions with questionable Zoster vs Trauma?   - Patient with tongue pain and seen with anterior ulcerations consolidating and crusting and posterior ulceration.  - Case discussed with pathology resident and culture/ cytology sent.   - HSV negative and Path (9/6) with normal appearing epithelial cells singly and in clusters devoid of viral cytopathic effect.   - Continue on magic mouth wash for pain    RESPIRATORY  # AHHRF second to volume overload   - Hx of CKD and HFpEF presented with SOB and hypercarbia second to volume overload requiring intubation and HD initiation   - Vent weaning attempted however limited requiring tracheostomy (9/1) with IP   - Course complicated by PIPs elevated (50s) and respiratory acidosis second to secretions vs volume ?    - Vent adjusted 20/300/5/30 without improvement.   - POCUS (9/8) with BL pleural effusions (large on right and small on left)   - CT CHEST (9/8) with TBM, BL pleural effusions and continued consolidations   - Continue on vent and given TBM increased PEEP (9/9) to 20/300/8/40 with overall improvement   - Continue on Duoneb and HTS for airway clearance   - Continue volume removal with diuresis and aggressive UF sessions.   - Discussing possible thora but appears improved and will monitor.  ]  - Bedside US showing decrease in pleural effusion - hold off thoracentesis 9/10     GI  # UGIB   - CGE x 1 episode on 8/24 and melena x 2 episodes on 8/31 likely residual blood.   - EGD (8/31) found with esophagitis and erosive gastropathy  - Continue on PPI BID through late October and then PPI QD   - No melena     # Dysphagia   - NGT-TF continued   - Pending PEG however needs improvement in infectious status prior to placement.     RENAL  # Progressive CKD   - Presented volume overload and progressive URSS on CKD (baseline CRE in 2s and mode into the 3s on admission) likely obstruction vs low flow state with shock vs AIN from drug reaction issue?  - POCUS with no signs of hydronephrosis   - Pressor support started with improvement in renal function  - Attempted steroid course in ICU without improvement in renal function   - Case discussed at length with renal and initiated on HD in ICU and now continued on HD TTHS, however remains volume overloaded.   - Patient oliguric however slightly responding to Bumex pushes - does not make enough urine   - Nephro following - Plan to resume HD TIW - Nephro recc holding HD alissa (9/21)   - Monitor renal function and UOP, and electrolytes for now  - Monitor urine output - Will perform bladder scan/straight cath as needed if retaining urine     Hyponatremia (improving) Na 123>>125  - c/w Salt tabs - s/p Hypertonic 1.5%NS @ 50cc/hr x 5 hr  - Renal- Will resume HD i/s/o of rising BUN, poor urine output on Bumex IVP     # Hyperphosphatemia (resolved)   - PTH normal and elevated phos likely from renal failure  - s/p Phos binder with Renvela - now d'beth as level wnl      INFECTIOUS DISEASE  # ESBL ECOLI and MSSA VAP c/b MSSA bacteremia   - RVP/ COVID (8/11) negative   - SCx (8/11) with MSSA s/p cefepime (8/12-8/13) and then ancef (8/14) however noted with drug reaction and then changed to vanco and aztreonam (8/12-8/13) in ICU .   - Course complicated by recurrent fevers and return of MSSA with bacteremia.   - SCx (9/1) with MSSA and ESBL ECOLI   - BAL (9/1) with MSSA   - BCx (9/1) with MSSA and cleared on (9/4)   - ECHO (9/6) unable to rule out endocarditis   - Continue on Brafdord (9/4 - ) and Vanco BY DOSE POST HD (9/4, 9/7, 9/9 ...) with duration TBD at this time.   - Given inability to rule out endocarditis will discuss possible 4 wks with ID?   - Course complicated by fever (9/7) and UA with leuks but no bacteria, however compared to prior improved, RVP/COVID and SCx negative, and RPT CXR similar to prior   - LE DOPPLERS- Blood and sputum cx NTD; Acute left deep vein thrombosis of the left popliteal vein.  - Febrile overnight 102 recultured and sent off   -Pt receiving vanco post HD however today given vanco 750mg x 1 will check Vanco level at end of HD session and dose   - BCx (9/20): sent- NTD  - ID recc CT C/A/P w/ IV contrast - on hold d/t unsure if pt tolerate HD alissa  - C/w fluconazole 200 qd (9/21)    # Left ear OE   - ENT evaluation (9/7) with no mastoid tenderness, however found with positive swelling and excoriation to left auricle and tenderness to manipulation of auricle. Debrided crusting of auricle and EAC evaluated with edema and unable to visualize TM. EAC debrided and found with watery exudate.   - s/p CiproHC (9/5 - 9/16)     # MRSA PCRs   - MRSA PCR (8/11 and 8/14) negative   - Next MRSA + PCR ordered for 10/8     HEME  # Anemia second to GIB vs renal disease   - Hold chemical DVT PPX given bleeding/ waxing and waning HH   - s/p multiple units of PRBCs (8/15, 8/21, 8/28, 8/31 and 9/8)   - Anemia panel with AOCD but with low %sat and ferrous sulfate added to optimize   - Monitor HH and discuss with renal for EPO sometime next week.   - Spoke with GI for clearance to start Heparin gtt for + DVT   - restarted heparin gtt (9/21)    Vascular  # DVT  - Pt with + popliteal DVT on SCD Spoke with GI no absolute contraindication for starting Heparin - advise close monitoring for bleeding - Do stat CTA if bleeding occurs and call IR   - Pt specific heparin gtt started with goal PTT 60-85  - will monitor closely   - restarted heparin gtt (9/21)    ONC   # Hx of Breast CA   - Patient dx in 2018 and s/p BL mastectomy (radical on right) and chemo and RT.   - Supportive care.     ENDOCRINE  # IDDM2   - Continue on NPH 3U and ISS   - Monitor FS and adjust as needed     LINES/ TUBES  - R ARTHUR TAYLOR (8/19 - 9/21)     SKIN  # Drug Eruption   - Attempted cefepime (8/12) vs ancef (8/13-8/14) and then noted with drug rxn.   - Hold further cephalosporins at this time   - s/p Steroids with prednisone 40 (8/18 - 9/2) then prednisone 30 (9/3-9/7), then prednisone 20 (9/8 - 9/10), then prednisone 10mg (9/11-9/13) then turn off.     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 While in MICU patient noted with worsening renal failure requiring HD initiation, CGE/ Melena s/p EGD 8/31 found with esophagitis and erosive gastropathy, new right cerebellar infarct and fevers second to ESBL COLI and MSSA VAP, MSSA bacteremia, left ear otitis externa and drug rxn to cephalosporins Course further complicated by prolonged vent time s/p tracheostomy 9/1 and transferred to RCU 9/3 completed by recurrent fevers with high PIP, respiratory acidosis and concern for volume overloaded.     NEUROLOGY  # Metabolic Encephalopath vs NEW cerebella infarct   - Baseline MS AOX3 with short term memory changes per family however noted with concern for poor mentation in ICU  - MRI (8/17) with NEW right cerebellar infarct and old left PCA/occipital infarcts  - STEPHANIE (8/17) with AV showing two linear mobile echogenic elements attached to valve leaflets consistent with Lambel's excrescence, but no TRINITY thrombus, and lambel's excrescence with uncertain clinical implication.   - EEG (8/11-8/15) with no seizures   - ILR interrogation (9/7) with SR and PACs falsely recorded as AF.   - Attempted to resume ASA for medical management but held given GIB   - Continue on Lipitor for medical management   - Course complicated by questionable head and body shaking 9/8 however prolactin elevated and shakes head yes/no to some questions.   - Lethargy noted    CARDIOVASCULAR  # HTN Urgency   # Septic vs vasoplegic shock   - Labile BPs ranging in between SBP 80s-200s and attempted labetalol, however noted with hypotension and bradycardia likely from BB toxicity vs shock state?    - Holding Labetalol and Catapres   - c/w Cardura for now   - Hypotensive in the 80s systolic overnight, requiring Midodrine 20mg x 1 (resolved)   - Stable today, still mildly hypotensive, but no intervention at this time     # Hx of HFpEF with likely ADHF with volume overload.   - ECHO (7/2023) with EF 59 with severe LVH and diastolic dysfunction   - STEPHANIE (8/18) with normal LVRVSF however noted with increased LA pressures and persistent LA septal bowing into RA.   - ECHO (8/11) with EF 60 with mild LVH, normal LVRVSF, and mild ddfxn.  - Remove volume with HD sessions and intermittent bumex pushes as BP allows    - c/w Bumex 2mg IV BID for now - Monitor UOP and electrolytes. Intermittently held due to low BP    HEENT   # Left ear OE   - ENT evaluation (9/7) with no mastoid tenderness, however found with positive swelling and excoriation to left auricle and tenderness to manipulation of auricle. Debrided crusting of auricle and EAC evaluated with edema and unable to visualize TM. EAC debrided and found with watery exudate.   - s/p CiproHC (9/5 - 9/16)   - Bradford discontinued. Rash on torso concerning for possible drug rash  - ENT following and ear wick change QD   - Wick out but needs ? Debridement wound care called/fu ent   - ENT recommending CT IAC w/ IV con but family is refusing as concerned given CKD, kidneys wouldn't recover from IV contrast. Spoke with Nephrology, ENT, and patient's  at length. Planned for CT non con and per , decision will be made from there but for now doesn't want to risk further harming kidneys.  - CT IAC showing acute on chronic left-sided otitis externa and acute on chronic left-sided otomastoiditis. Superimposed left-sided tympanosclerosis. Superimposed cholesteatoma formation within the left tympanomastoid cavity cannot be excluded. No evidence of malignant otitis externa.  - ENT consulted (9/20) for white purulent discharge from left ear, recc: ENT:  acetic acid drops BID to left ear. Mupirocin ointment to the left pinna BID, cover with xeroform after placing mupirocin ointment. Pressure offloading of the left ear.    # Oral Lesions with questionable Zoster vs Trauma?   - Patient with tongue pain and seen with anterior ulcerations consolidating and crusting and posterior ulceration.  - Case discussed with pathology resident and culture/ cytology sent.   - HSV negative and Path (9/6) with normal appearing epithelial cells singly and in clusters devoid of viral cytopathic effect.   - Continue on magic mouth wash for pain    RESPIRATORY  # AHHRF second to volume overload   - Hx of CKD and HFpEF presented with SOB and hypercarbia second to volume overload requiring intubation and HD initiation   - Vent weaning attempted however limited requiring tracheostomy (9/1) with IP   - Course complicated by PIPs elevated (50s) and respiratory acidosis second to secretions vs volume ?    - Vent adjusted 20/300/5/30 without improvement.   - POCUS (9/8) with BL pleural effusions (large on right and small on left)   - CT CHEST (9/8) with TBM, BL pleural effusions and continued consolidations   - Continue on vent and given TBM increased PEEP (9/9) to 20/300/8/40 with overall improvement   - Continue on Duoneb and HTS for airway clearance   - Continue volume removal with diuresis and aggressive UF sessions.   - Discussing possible thora but appears improved and will monitor.  ]  - Bedside US showing decrease in pleural effusion - hold off thoracentesis 9/10     GI  # UGIB   - CGE x 1 episode on 8/24 and melena x 2 episodes on 8/31 likely residual blood.   - EGD (8/31) found with esophagitis and erosive gastropathy  - Continue on PPI BID through late October and then PPI QD   - No melena     # Dysphagia   - NGT-TF continued   - Pending PEG however needs improvement in infectious status prior to placement.     RENAL  # Progressive CKD   - Presented volume overload and progressive RUSS on CKD (baseline CRE in 2s and mode into the 3s on admission) likely obstruction vs low flow state with shock vs AIN from drug reaction issue?  - POCUS with no signs of hydronephrosis   - Pressor support started with improvement in renal function  - Attempted steroid course in ICU without improvement in renal function   - Case discussed at length with renal and initiated on HD in ICU and now continued on HD TTHS, however remains volume overloaded.   - Patient oliguric however slightly responding to Bumex pushes - does not make enough urine   - Nephro following - Plan to resume HD TIW - Nephro recc holding HD alissa (9/21)   - Monitor renal function and UOP, and electrolytes for now  - Monitor urine output - Will perform bladder scan/straight cath as needed if retaining urine     Hyponatremia (improving) Na 123>>125  - c/w Salt tabs - s/p Hypertonic 1.5%NS @ 50cc/hr x 5 hr  - Renal- Will resume HD i/s/o of rising BUN, poor urine output on Bumex IVP     # Hyperphosphatemia (resolved)   - PTH normal and elevated phos likely from renal failure  - s/p Phos binder with Renvela - now d'beth as level wnl      INFECTIOUS DISEASE  # ESBL ECOLI and MSSA VAP c/b MSSA bacteremia   - RVP/ COVID (8/11) negative   - SCx (8/11) with MSSA s/p cefepime (8/12-8/13) and then ancef (8/14) however noted with drug reaction and then changed to vanco and aztreonam (8/12-8/13) in ICU .   - Course complicated by recurrent fevers and return of MSSA with bacteremia.   - SCx (9/1) with MSSA and ESBL ECOLI   - BAL (9/1) with MSSA   - BCx (9/1) with MSSA and cleared on (9/4)   - ECHO (9/6) unable to rule out endocarditis   - Continue on Bradford (9/4 - ) and Vanco BY DOSE POST HD (9/4, 9/7, 9/9 ...) with duration TBD at this time.   - Given inability to rule out endocarditis will discuss possible 4 wks with ID?   - Course complicated by fever (9/7) and UA with leuks but no bacteria, however compared to prior improved, RVP/COVID and SCx negative, and RPT CXR similar to prior   - LE DOPPLERS- Blood and sputum cx NTD; Acute left deep vein thrombosis of the left popliteal vein.  - Febrile overnight 102 recultured and sent off   -Pt receiving vanco post HD however today given vanco 750mg x 1 will check Vanco level at end of HD session and dose   - BCx (9/20): sent- NTD  - ID recc CT C/A/P w/ IV contrast - on hold d/t unsure if pt tolerate HD alissa  - C/w fluconazole 200 qd (9/21)    # Left ear OE   - ENT evaluation (9/7) with no mastoid tenderness, however found with positive swelling and excoriation to left auricle and tenderness to manipulation of auricle. Debrided crusting of auricle and EAC evaluated with edema and unable to visualize TM. EAC debrided and found with watery exudate.   - s/p CiproHC (9/5 - 9/16)     # MRSA PCRs   - MRSA PCR (8/11 and 8/14) negative   - Next MRSA + PCR ordered for 10/8     HEME  # Anemia second to GIB vs renal disease   - Hold chemical DVT PPX given bleeding/ waxing and waning HH   - s/p multiple units of PRBCs (8/15, 8/21, 8/28, 8/31 and 9/8)   - Anemia panel with AOCD but with low %sat and ferrous sulfate added to optimize   - Monitor HH and discuss with renal for EPO sometime next week.   - Spoke with GI for clearance to start Heparin gtt for + DVT   - Restarted heparin gtt (9/21)    Vascular  # DVT  - Pt with + popliteal DVT on SCD Spoke with GI no absolute contraindication for starting Heparin - advise close monitoring for bleeding - Do stat CTA if bleeding occurs and call IR   - Pt specific heparin gtt started with goal PTT 60-85  - will monitor closely   - Restarted heparin gtt (9/21)    ONC   # Hx of Breast CA   - Patient dx in 2018 and s/p BL mastectomy (radical on right) and chemo and RT.   - Supportive care.     ENDOCRINE  # IDDM2   - Continue on NPH 3U and ISS   - Monitor FS and adjust as needed     LINES/ TUBES  - R ARTHUR TAYLOR (8/19 - 9/21)     SKIN  # Drug Eruption   - Attempted cefepime (8/12) vs ancef (8/13-8/14) and then noted with drug rxn.   - Hold further cephalosporins at this time   - s/p Steroids with prednisone 40 (8/18 - 9/2) then prednisone 30 (9/3-9/7), then prednisone 20 (9/8 - 9/10), then prednisone 10mg (9/11-9/13) then turn off.     ETHICS/ GOC    - FULL CODE     DISPO - TBD

## 2023-09-24 NOTE — PROGRESS NOTE ADULT - ASSESSMENT
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 ?uremia of late; we attempted HD last week from the shiley that has been in place for the past few weeks; the catheter was not able to be used successfully. Shiley is out now, but placement of new HD catheter would be a potential nidus for further infection    (3)Hyponatremia - low but slowly improving/stable, on NaCl tabs    (4)Pulm- hypercapnic respiratory failure on admission - now s/p trach; remains on vent     (5)ID - s/p IV Meropenem    (6)Anemia - receiving DAVID and PRBCs intermittently    (7)AMS - at least at times over the past 2-3 days, she has been mentating well; this argues against uremic encephalopathy    RECOMMEND:  (1)No HD for now, attempting efforts to further forgo HD; if we are to re-perform HD, would plan for new shiley placement  (2)NaCl tabs as ordered  (3)Monitor I/Os    Nilda Espinoza DNP  [facility]  [phone]          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 ?uremia of late; we attempted HD last week from the shiley that has been in place for the past few weeks; the catheter was not able to be used successfully. Shiley is out now, but placement of new HD catheter would be a potential nidus for further infection    (3)Hyponatremia - low but slowly improving/stable, on NaCl tabs    (4)Pulm- hypercapnic respiratory failure on admission - now s/p trach; remains on vent     (5)ID - s/p IV Meropenem    (6)Anemia - receiving DAVID and PRBCs intermittently    (7)AMS - at least at times over the past 2-3 days, she has been mentating well; this argues against uremic encephalopathy    RECOMMEND:  (1)No HD for now, attempting efforts to further forgo HD; if we are to re-perform HD, would plan for new shiley placement  (2)NaCl tabs as ordered  (3)Monitor I/Os    Nilda Espinoza DNP  [facility]  [phone]       RENAL ATTENDING NOTE  Patient seen and examined with NP. Agree with assessment and plan as above.      Rachael Cali  [facility]  Office: [phone]

## 2023-09-24 NOTE — PROGRESS NOTE ADULT - ASSESSMENT
72 y/o F well known to me from my housecal outptn practice. she was admitted at [facility] 7/12-7/22 w aspiration PNA, was treated w CEFEPIME, developed an allergic rash,  dCHF, + MAC on AFB culture, had been progressively getting more and more lethargic and dyspneic at home since DC.   In  am of 8/11/23  ptn presented with respiratory distress w hypoxia and hypercarbia requiring intubation 2/2 volume overload +/- Asp PNA      Neuro   responds appropriately   Baseline MS AOx3, aphasic   - h/o CVA , on aspirin and statin . resumed w feeding tube, ASA resumed 8/26  - eeg  2/2 tremors, no sz focus  - less responsive in the past few days  - MRI 8/17:  new R cerebellar infarct, old left PCA/Occipital infarct. 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 Mcnulty, now in RCU  s/p septic shock overnight 9/1, now on meropenem, HDS  s/p trache, on vent support, copious secretions      Renal   Hyperkalemia with EKG changes  , initially treated w LOKELMA,  now resolved   Ptn well know to Dr. Colon, consult reviewed    HD 8/19,  8/21, 8/26 and 8/28.  s/p PUF 8/29 2.5 Liters, HD 8/30,  9/1, 9/4  started chronic HD 9/7,   cardura resumed  on diuretics  hyponatremic  ptn is less responsive, its possible she is uremic, unable to complete HD 9/19 2/2 bleeding at [facility],   [facility] shiley was removed 9/20, if need HD will need another shiley placed as per renal. On IV Fluconazole for fungal cx+ from O. externa DC        GI  NPO  on tube feeds  coffee ground emesis x 1 8/24, melena overnight 8/31, s/p 1UPRBC, EGD/Colonoscopy: erosive gastropathy, esophagisits, on colonoscopy old blood seen no active bleeding, poor prep  family to decide re PEG placement    Endocrine  T2DM   A1C 7.1 in 7/2023   - BG goal 140-200     ID   No fever/leukocytosis, recheck temp   Hx latent TB which was treated, no concern for TB   - grew MAC on AFB culture from most recent admission. at [facility]  -MSSA Bacteremia 9/1, allergic to cephalosprins and PCN, on Vanco as per ID, renal dosing,   - sputum also grew E.Coli-ESBL, as per ID recs for  Bradford through 9/7( 1 week course as per ID) , afebrile.  - 9/8:  spike  temp 38.1C, has O. externa, has a wick, recs are to get a CT to R/O an abscess/bony involvement. cont Meropenem  9/9: ptn w fever overnight to 100.8,  may need shiley pulled if bacteremic, needs NECK CT as per ENT to R/O Mastoid bone involvement while having ongoing purulent DC from L ear 2/2 O. externa, getting daily wick changes  9/10:  spiked 102 overnight through Bradford and Vanco, purulent DC from O. externa, ENT recommend Ct of mastoid. ptn in HD, has Shiley in place, consider pulling shiley and send for Cx. would d/w Renal, and consider contacting  ID, last seen by Dr. Conner 9/6 9/11: remains febrile, Dr. Conner reconsulted. cont Bradford, Vanco  9/12: tmax 100.5, fever curve is improving, awaiting Left ear CT, on Bradford since 9/1. on  vanco for MSSA Bacteremia, does not tolerate cephalosporins. through 10/2  9/13: on full vent support via trache, appears uncomfortable and onur 2/2 Left O. externa. has an incidental left middle ear tumor, no acute middle ear interventions recommended, left ear wick replaced. on Meropenem, cx from Ear DC is neg. On Vanco for MSSA bacteremia through 10/2, course of Meropenem as per ID, afebrile in the past 24 hrs . Cardura for HTN resumed, HGB dropped to 6.4, got a dose of EPO and 1UPRBC, rpt 7.8, remains on HEPARIN drip for LEFT popliteal DVT  9/14: cont on Mupirocin locally to Left ear, cont IV Bradford, ENT signed off, afebrile x 48 hrs, Mro course to be determined by ID, on Vanco for MSSA bacteremia through 10/2. ongoing worsening hyponatremia, Hypertonic saline as per renal, no HD today, s/p 1UPRBC 9/13  9/15: spiking temps again, if cont to spike as per ID re PAN scan. cont Vanco for MSSA Bacteremia  9/16-18: no temp overnight  afebrile, cont to have Left ear DC,   on Vanco and ,bradford through 10/2  On IV Fluconazole for fungal cx+ from O. externa Discharge.   fluconazole started 9/21, ptn developed an allergic rash on 9/22. doubt its 2/2 to MEROPENEM.  MEropenem was DCed 2/2 causing possible drug rash.  on VANCO based on levels for MSSA bacteremia        Heme/Onc  ppx: place on SCD  Transfuse for hgb 6.4, no obv bleed. HDS  LEFT POPLITEAL VEIN ACUTE DVT on 9/10    Ethics  GOC - Discussed GOC with daughter and , they have opted in the past for full code. and she remains full code at present

## 2023-09-24 NOTE — PROGRESS NOTE ADULT - SUBJECTIVE AND OBJECTIVE BOX
Subjective: Patient seen and examined. No new events except as noted.     REVIEW OF SYSTEMS:    Unable to obtain      MEDICATIONS:  MEDICATIONS  (STANDING):  acetic acid 0.25% Topical Irrigation 1 Application(s) Topical two times a day  albuterol/ipratropium for Nebulization 3 milliLiter(s) Nebulizer every 6 hours  atorvastatin 40 milliGRAM(s) Oral at bedtime  buMETAnide Injectable 2 milliGRAM(s) IV Push two times a day  chlorhexidine 0.12% Liquid 15 milliLiter(s) Oral Mucosa every 12 hours  chlorhexidine 2% Cloths 1 Application(s) Topical daily  dextrose 5%. 1000 milliLiter(s) (50 mL/Hr) IV Continuous <Continuous>  dextrose 5%. 1000 milliLiter(s) (100 mL/Hr) IV Continuous <Continuous>  dextrose 50% Injectable 12.5 Gram(s) IV Push once  dextrose 50% Injectable 25 Gram(s) IV Push once  dextrose 50% Injectable 25 Gram(s) IV Push once  epoetin odalis (EPOGEN) Injectable 98911 Unit(s) SubCutaneous <User Schedule>  ferrous    sulfate Liquid 300 milliGRAM(s) Enteral Tube daily  fluconAZOLE IVPB 200 milliGRAM(s) IV Intermittent every 24 hours  glucagon  Injectable 1 milliGRAM(s) IntraMuscular once  heparin  Infusion 1050 Unit(s)/Hr (10.5 mL/Hr) IV Continuous <Continuous>  insulin lispro (ADMELOG) corrective regimen sliding scale   SubCutaneous every 6 hours  insulin NPH human recombinant 3 Unit(s) SubCutaneous every 6 hours  midodrine. 10 milliGRAM(s) Oral three times a day  mupirocin 2% Ointment 1 Application(s) Topical two times a day  pantoprazole  Injectable 40 milliGRAM(s) IV Push two times a day  psyllium Powder 1 Packet(s) Oral daily  sodium chloride 2 Gram(s) Oral two times a day      PHYSICAL EXAM:  T(C): 35.9 (09-24-23 @ 04:00), Max: 37.3 (09-23-23 @ 17:24)  HR: 98 (09-24-23 @ 07:26) (88 - 107)  BP: 106/28 (09-24-23 @ 04:00) (103/29 - 127/95)  RR: 14 (09-24-23 @ 04:00) (14 - 15)  SpO2: 97% (09-24-23 @ 07:26) (96% - 100%)  Wt(kg): --  I&O's Summary    23 Sep 2023 07:01  -  24 Sep 2023 07:00  --------------------------------------------------------  IN: 464 mL / OUT: 100 mL / NET: 364 mL        Appearance: NAD, +trach  HEENT: dry oral mucosa  Lymphatic: No lymphadenopathy  Cardiovascular: Normal S1 S2, No JVD, No murmurs, No edema  Respiratory: Decreased BS, + trach to vent	  Neuro: opens eyes  Gastrointestinal: Soft, Non-tender, + BS, + NGT  Skin: No rashes, No ecchymoses, No cyanosis	  Extremities: No strength/ROM 2/2 sedation, + BL LE edema  Vascular: Peripheral pulses palpable 2+ bilaterally    Mode: AC/ CMV (Assist Control/ Continuous Mandatory Ventilation), RR (machine): 17, TV (machine): 340, FiO2: 35, PEEP: 8, ITime: 0.84, MAP: 15, PIP: 46    LABS:    CARDIAC MARKERS:                                7.7    5.41  )-----------( 200      ( 24 Sep 2023 06:00 )             24.7     09-24    127<L>  |  91<L>  |  104<H>  ----------------------------<  184<H>  4.4   |  23  |  2.66<H>    Ca    8.9      24 Sep 2023 06:00  Phos  3.9     09-24  Mg     2.40     09-24    TPro  5.4<L>  /  Alb  1.6<L>  /  TBili  <0.2  /  DBili  x   /  AST  31  /  ALT  8   /  AlkPhos  326<H>  09-24    proBNP:   Lipid Profile:   HgA1c:   TSH:             TELEMETRY: 	    ECG:  	  RADIOLOGY:   DIAGNOSTIC TESTING:  [ ] Echocardiogram:  [ ]  Catheterization:  [ ] Stress Test:    OTHER:

## 2023-09-24 NOTE — PROGRESS NOTE ADULT - NS ATTEND AMEND GEN_ALL_CORE FT
---------- Pt is a 73F with PMHx IDDMII, CKD Stage IV, CHFpEF, latent TB (s/p tx,) hx breast cancer (2018, s/p mastectomy and adjuvant CT/RT), and hx CVA s/p trach/PEG (now decannulated) initially presenting to [facility] on 8/11/23 with acute hypoxemic and hypercapnic respiratory failure s/p ETT intubation/MV and ARF with pulmonary edema c/b HTN emergency requiring nicardipine drip transferred to MICU for further management with failure to wean from ventilator s/p tracheostomy placement and RCU transfer for further management.     Pt initially p/w worsening RUE shaking and dysconjugate gaze with MRI 8/17 (+) new right cerebellar, midbrain, and multiple tiny embolic infarcts as well as known left PCA CVA. EEG (-). 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 ILR in place from prior CVA evaluation, last interrogated 9/7 without AF. At baseline, pt reportedly wheelchair bound with RUE tremors and some ADL assistance. Pt was unable to tolerate ASA 2/2 GIB, will re-attempt once more stable. Worsening mentation this week possibly 2/2 metabolic/infectious encephalopathy in the setting of hyponatremia, uremia, and recurrent infection. No new focal deficits noted. Pt intermittently awakens but only for very short periods of time.    Pt with acute hypoxemic and hypercapnic respiratory failure s/p ETT intubation/MV possibly 2/2 flash pulmonary edema in the setting of HTN emergency +/- aspiration event. Pt with failure of ventilatory weaning now s/p tracheostomy placement (IP 9/1.) Of note, pt also noted to have evidence of midbrain CVA with possible concern for central effect on respiratory drive, will monitor carefully and continue weaning trials as tolerated. Pt able to spontaneously breath intermittently, will continue to attempt further weaning trials. Airway clearance therapy in place. Wean O2 supplementation for goal O2 saturation 90-95%. Oral hygiene and aspiration precautions in place. Trach care and suctioning as per RCU team.     Pt p/w ARF on CKD requiring initiation of HD 2/2 pulmonary edema and metabolic acidosis with metabolic encephalopathy in MICU. Now off HD since 9/20. Pt with (+) UOP on diuretics. No urgent indication for HD today. RIJ Shiley removed as no longer functioning. Finished prednisone taper for possible AIN. Strict I/Os and daily BMPs in place. Hospital course further c/b acute hypervolemic hyponatremia and uremia in the setting of renal failure not resolving despite hypertonic saline, diuretics, and salt tabs. Now slowly improving, HD as needed. If pt shows s/s requiring HD will need replacement catheter.     Hospital course further c/b recurrent infections, most recently with MSSA bacteremia (9/1, cleared 9/4 and 9/8) with (+) MSSA and ESBL EColi in BAL as well. Pt with possible cefepime vs. cefazolin drug-induced rash followed by transition to vancomycin. Was then found to have left otitis externa (9/5) s/p serial bedside debridement with worsening necrotic tissue/erythema for which she was also initiated on meropenem. Family refusing contrast, non-contrast study obtained with evidence of bone involvement but no indication for further surgical intervention. Pt underent ENT debridement x2 (last 9/21). Found to have new black spores and white discoloration, concern for candida infection. Given fevers and need for debridement fluconazole added to regimen 9/21. Will also continue with meropenem, CT imaging does not show significant enough bone involvement to require prolonged course for osteomyelitis. Plan to also complete vancomycin by level x4 week course through 10/2 (given inability to exclude endocarditis). Discussed with ID, recs appreciated. Obtain pan-CT scan if remains febrile.     Pt also with oropharyngeal dysphagia requiring NGTs followed by UGIB s/p EGD (8/31) found to have esophagitis/erosive gastropathy now on PPI BID. Pt further found to have popliteal DVT. No c/i by GI for A/C, pt initiated on heparin drip with patient-specific pTTs. Will need to ultimately transition to Coumadin x3-6 months after PEG placement.       f/u the Hg/Hct if  less than 8 will consider for transfusion     Dispo pending medical optimization. Pt full code. DVT ppx full A/C with heparin drip.

## 2023-09-24 NOTE — PROGRESS NOTE ADULT - SUBJECTIVE AND OBJECTIVE BOX
CHIEF COMPLAINT: Patient is a 73y old  Female who presents with a chief complaint of Respiratory distress (24 Sep 2023 08:49)      Interval Events:    REVIEW OF SYSTEMS:  [ ] All other systems negative  [ ] Unable to assess ROS because ________    Mode: AC/ CMV (Assist Control/ Continuous Mandatory Ventilation), RR (machine): 15, TV (machine): 350, FiO2: 35, PEEP: 8, ITime: 0.62, MAP: 14, PIP: 44      OBJECTIVE:  ICU Vital Signs Last 24 Hrs  T(C): 35.9 (24 Sep 2023 04:00), Max: 37.3 (23 Sep 2023 17:24)  T(F): 96.6 (24 Sep 2023 04:00), Max: 99.1 (23 Sep 2023 17:24)  HR: 107 (24 Sep 2023 10:50) (88 - 108)  BP: 106/28 (24 Sep 2023 04:00) (103/29 - 110/40)  BP(mean): 46 (24 Sep 2023 04:00) (23 - 59)  ABP: --  ABP(mean): --  RR: 14 (24 Sep 2023 04:00) (14 - 15)  SpO2: 98% (24 Sep 2023 10:50) (96% - 100%)    O2 Parameters below as of 24 Sep 2023 10:27  Patient On (Oxygen Delivery Method): ventilator          Mode: AC/ CMV (Assist Control/ Continuous Mandatory Ventilation), RR (machine): 15, TV (machine): 350, FiO2: 35, PEEP: 8, ITime: 0.62, MAP: 14, PIP: 44    09-23 @ 07:01  -  09-24 @ 07:00  --------------------------------------------------------  IN: 464 mL / OUT: 100 mL / NET: 364 mL      CAPILLARY BLOOD GLUCOSE      POCT Blood Glucose.: 197 mg/dL (24 Sep 2023 06:13)      HOSPITAL MEDICATIONS:  MEDICATIONS  (STANDING):  acetic acid 0.25% Topical Irrigation 1 Application(s) Topical two times a day  albuterol/ipratropium for Nebulization 3 milliLiter(s) Nebulizer every 6 hours  atorvastatin 40 milliGRAM(s) Oral at bedtime  buMETAnide Injectable 2 milliGRAM(s) IV Push two times a day  chlorhexidine 0.12% Liquid 15 milliLiter(s) Oral Mucosa every 12 hours  chlorhexidine 2% Cloths 1 Application(s) Topical daily  dextrose 5%. 1000 milliLiter(s) (50 mL/Hr) IV Continuous <Continuous>  dextrose 5%. 1000 milliLiter(s) (100 mL/Hr) IV Continuous <Continuous>  dextrose 50% Injectable 12.5 Gram(s) IV Push once  dextrose 50% Injectable 25 Gram(s) IV Push once  dextrose 50% Injectable 25 Gram(s) IV Push once  epoetin odalis (EPOGEN) Injectable 95008 Unit(s) SubCutaneous <User Schedule>  ferrous    sulfate Liquid 300 milliGRAM(s) Enteral Tube daily  fluconAZOLE IVPB 200 milliGRAM(s) IV Intermittent every 24 hours  glucagon  Injectable 1 milliGRAM(s) IntraMuscular once  heparin  Infusion 1050 Unit(s)/Hr (10.5 mL/Hr) IV Continuous <Continuous>  insulin lispro (ADMELOG) corrective regimen sliding scale   SubCutaneous every 6 hours  insulin NPH human recombinant 3 Unit(s) SubCutaneous every 6 hours  midodrine. 10 milliGRAM(s) Oral three times a day  mupirocin 2% Ointment 1 Application(s) Topical two times a day  pantoprazole  Injectable 40 milliGRAM(s) IV Push two times a day  psyllium Powder 1 Packet(s) Oral daily  sodium chloride 2 Gram(s) Oral two times a day    MEDICATIONS  (PRN):  acetaminophen   Oral Liquid .. 650 milliGRAM(s) Oral every 6 hours PRN Temp greater or equal to 38C (100.4F), Mild Pain (1 - 3)  artificial tears (preservative free) Ophthalmic Solution 1 Drop(s) Both EYES three times a day PRN Dry Eyes  calamine/zinc oxide Lotion 1 Application(s) Topical two times a day PRN Rash and/or Itching  dextrose Oral Gel 15 Gram(s) Oral once PRN Blood Glucose LESS THAN 70 milliGRAM(s)/deciliter      LABS:                        7.7    5.41  )-----------( 200      ( 24 Sep 2023 06:00 )             24.7     09-24    127<L>  |  91<L>  |  104<H>  ----------------------------<  184<H>  4.4   |  23  |  2.66<H>    Ca    8.9      24 Sep 2023 06:00  Phos  3.9     09-24  Mg     2.40     09-24    TPro  5.4<L>  /  Alb  1.6<L>  /  TBili  <0.2  /  DBili  x   /  AST  31  /  ALT  8   /  AlkPhos  326<H>  09-24    PTT - ( 23 Sep 2023 01:05 )  PTT:73.3 sec  Urinalysis Basic - ( 24 Sep 2023 06:00 )    Color: x / Appearance: x / SG: x / pH: x  Gluc: 184 mg/dL / Ketone: x  / Bili: x / Urobili: x   Blood: x / Protein: x / Nitrite: x   Leuk Esterase: x / RBC: x / WBC x   Sq Epi: x / Non Sq Epi: x / Bacteria: x            PAST MEDICAL & SURGICAL HISTORY:  Breast CA      Diabetes      Stroke      Cardiac arrest      HTN (hypertension)      H/O mastectomy, bilateral          FAMILY HISTORY:  No pertinent family history in first degree relatives        Social History:      RADIOLOGY:  [ ] Reviewed and interpreted by me    PULMONARY FUNCTION TESTS:    EKG: CHIEF COMPLAINT: Patient is a 73y old  Female who presents with a chief complaint of Respiratory distress (24 Sep 2023 08:49)    Interval Events: None reported overnight. Clinically unchanged. VSS, and medications reviewed.     REVIEW OF SYSTEMS:  See above  [x] Unable to assess ROS because poor mental status     Mode: AC/ CMV (Assist Control/ Continuous Mandatory Ventilation), RR (machine): 15, TV (machine): 350, FiO2: 35, PEEP: 8, ITime: 0.62, MAP: 14, PIP: 44    OBJECTIVE:  ICU Vital Signs Last 24 Hrs  T(C): 35.9 (24 Sep 2023 04:00), Max: 37.3 (23 Sep 2023 17:24)  T(F): 96.6 (24 Sep 2023 04:00), Max: 99.1 (23 Sep 2023 17:24)  HR: 107 (24 Sep 2023 10:50) (88 - 108)  BP: 106/28 (24 Sep 2023 04:00) (103/29 - 110/40)  BP(mean): 46 (24 Sep 2023 04:00) (23 - 59)  ABP: --  ABP(mean): --  RR: 14 (24 Sep 2023 04:00) (14 - 15)  SpO2: 98% (24 Sep 2023 10:50) (96% - 100%)    O2 Parameters below as of 24 Sep 2023 10:27  Patient On (Oxygen Delivery Method): ventilator    Mode: AC/ CMV (Assist Control/ Continuous Mandatory Ventilation), RR (machine): 15, TV (machine): 350, FiO2: 35, PEEP: 8, ITime: 0.62, MAP: 14, PIP: 44    09-23 @ 07:01  -  09-24 @ 07:00  --------------------------------------------------------  IN: 464 mL / OUT: 100 mL / NET: 364 mL      CAPILLARY BLOOD GLUCOSE      POCT Blood Glucose.: 197 mg/dL (24 Sep 2023 06:13)      HOSPITAL MEDICATIONS:  MEDICATIONS  (STANDING):  acetic acid 0.25% Topical Irrigation 1 Application(s) Topical two times a day  albuterol/ipratropium for Nebulization 3 milliLiter(s) Nebulizer every 6 hours  atorvastatin 40 milliGRAM(s) Oral at bedtime  buMETAnide Injectable 2 milliGRAM(s) IV Push two times a day  chlorhexidine 0.12% Liquid 15 milliLiter(s) Oral Mucosa every 12 hours  chlorhexidine 2% Cloths 1 Application(s) Topical daily  dextrose 5%. 1000 milliLiter(s) (50 mL/Hr) IV Continuous <Continuous>  dextrose 5%. 1000 milliLiter(s) (100 mL/Hr) IV Continuous <Continuous>  dextrose 50% Injectable 12.5 Gram(s) IV Push once  dextrose 50% Injectable 25 Gram(s) IV Push once  dextrose 50% Injectable 25 Gram(s) IV Push once  epoetin odalis (EPOGEN) Injectable 25331 Unit(s) SubCutaneous <User Schedule>  ferrous    sulfate Liquid 300 milliGRAM(s) Enteral Tube daily  fluconAZOLE IVPB 200 milliGRAM(s) IV Intermittent every 24 hours  glucagon  Injectable 1 milliGRAM(s) IntraMuscular once  heparin  Infusion 1050 Unit(s)/Hr (10.5 mL/Hr) IV Continuous <Continuous>  insulin lispro (ADMELOG) corrective regimen sliding scale   SubCutaneous every 6 hours  insulin NPH human recombinant 3 Unit(s) SubCutaneous every 6 hours  midodrine. 10 milliGRAM(s) Oral three times a day  mupirocin 2% Ointment 1 Application(s) Topical two times a day  pantoprazole  Injectable 40 milliGRAM(s) IV Push two times a day  psyllium Powder 1 Packet(s) Oral daily  sodium chloride 2 Gram(s) Oral two times a day    MEDICATIONS  (PRN):  acetaminophen   Oral Liquid .. 650 milliGRAM(s) Oral every 6 hours PRN Temp greater or equal to 38C (100.4F), Mild Pain (1 - 3)  artificial tears (preservative free) Ophthalmic Solution 1 Drop(s) Both EYES three times a day PRN Dry Eyes  calamine/zinc oxide Lotion 1 Application(s) Topical two times a day PRN Rash and/or Itching  dextrose Oral Gel 15 Gram(s) Oral once PRN Blood Glucose LESS THAN 70 milliGRAM(s)/deciliter    PHYSICAL EXAM  GENERAL: Laying in bed comfortably, NAD  HEENT: +Trach, area c/d/i, +L ear purulent drainage noted  PULM: +Normal resp effort, + Rhonchi b/l, no w appreciated   HEART: +s1, s2  GI: +BS, soft, nt/nd, no peritoneal signs noted  MSK: No asymmetry, LIZZIE in upper extremities. RLE weakness- baseline. 2+ b/l pitting edema noted in lower ext   NEURO: Does not follow commands    LABS:                        7.7    5.41  )-----------( 200      ( 24 Sep 2023 06:00 )             24.7     09-24    127<L>  |  91<L>  |  104<H>  ----------------------------<  184<H>  4.4   |  23  |  2.66<H>    Ca    8.9      24 Sep 2023 06:00  Phos  3.9     09-24  Mg     2.40     09-24    TPro  5.4<L>  /  Alb  1.6<L>  /  TBili  <0.2  /  DBili  x   /  AST  31  /  ALT  8   /  AlkPhos  326<H>  09-24    PTT - ( 23 Sep 2023 01:05 )  PTT:73.3 sec  Urinalysis Basic - ( 24 Sep 2023 06:00 )    Color: x / Appearance: x / SG: x / pH: x  Gluc: 184 mg/dL / Ketone: x  / Bili: x / Urobili: x   Blood: x / Protein: x / Nitrite: x   Leuk Esterase: x / RBC: x / WBC x   Sq Epi: x / Non Sq Epi: x / Bacteria: x    PAST MEDICAL & SURGICAL HISTORY:  Breast CA    Diabetes    Stroke    Cardiac arrest    HTN (hypertension)    H/O mastectomy, bilateral    FAMILY HISTORY:  No pertinent family history in first degree relatives    Social History:    RADIOLOGY:  [ ] Reviewed and interpreted by me    PULMONARY FUNCTION TESTS:    EKG:

## 2023-09-25 NOTE — PROGRESS NOTE ADULT - NS ATTEND AMEND GEN_ALL_CORE FT
Pt is a 73F with PMHx IDDMII, CKD Stage IV, CHFpEF, latent TB (s/p tx,) hx breast cancer (2018, s/p mastectomy and adjuvant CT/RT), and hx CVA s/p trach/PEG (now decannulated) initially presenting to [facility] on 8/11/23 with acute hypoxemic and hypercapnic respiratory failure s/p ETT intubation/MV and ARF with pulmonary edema c/b HTN emergency requiring nicardipine drip transferred to MICU for further management with failure to wean from ventilator s/p tracheostomy placement and RCU transfer for further management.     Pt initially p/w worsening RUE shaking and dysconjugate gaze with MRI 8/17 (+) new right cerebellar, midbrain, and multiple tiny embolic infarcts as well as known left PCA CVA. EEG (-). 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 ILR in place from prior CVA evaluation, last interrogated 9/7 without AF. At baseline, pt reportedly wheelchair bound with RUE tremors and some ADL assistance. Pt was unable to tolerate ASA 2/2 GIB, will re-attempt once more stable. Worsening mentation x2 weeks possibly 2/2 metabolic/infectious encephalopathy in the setting of hyponatremia, uremia, and recurrent infection. No new focal deficits noted. Pt intermittently awakens but only for very short periods of time. Will also repeat CT Head.     Pt with acute hypoxemic and hypercapnic respiratory failure s/p ETT intubation/MV possibly 2/2 flash pulmonary edema in the setting of HTN emergency +/- aspiration event. Pt with failure of ventilatory weaning now s/p tracheostomy placement (IP 9/1.) Of note, pt also noted to have evidence of midbrain CVA with possible concern for central effect on respiratory drive, will monitor carefully and continue weaning trials as tolerated. Pt able to spontaneously breath intermittently, will continue to attempt further weaning trials. Airway clearance therapy in place. Wean O2 supplementation for goal O2 saturation 90-95%. Oral hygiene and aspiration precautions in place. Trach care and suctioning as per RCU team.     Pt p/w ARF on CKD requiring initiation of HD 2/2 pulmonary edema and metabolic acidosis with metabolic encephalopathy in MICU. Now off HD since 9/20. Pt with (+) UOP on diuretics. No urgent indication for HD today. RIJ Shifabi removed as no longer functioning. Finished prednisone taper for possible AIN. Strict I/Os and daily BMPs in place. Hospital course further c/b acute hypervolemic hyponatremia and uremia in the setting of renal failure not resolving despite hypertonic saline, diuretics, and salt tabs. Will increase bumetanide to continuous drip x24 hrs 2/2 worsening fluid overload and uremia. If pt shows s/s requiring HD will need replacement catheter.     Hospital course further c/b recurrent infections, most recently with MSSA bacteremia (9/1, cleared 9/4 and 9/8) with (+) MSSA and ESBL EColi in BAL as well. Pt with possible cefepime vs. cefazolin drug-induced rash followed by transition to vancomycin. Was then found to have left otitis externa (9/5) s/p serial bedside debridement with worsening necrotic tissue/erythema for which she was also initiated on meropenem. Family refusing contrast, non-contrast study obtained with evidence of bone involvement but no indication for further surgical intervention. Pt underwent ENT debridement x2 (last 9/21). Found to have new black spores and white discoloration with concern for superimposed candida infection. Given fevers and need for debridement fluconazole added to regimen 9/21. Meropenem completed, CT imaging does not show significant enough bone involvement to require prolonged course for osteomyelitis. Plan to also complete vancomycin by level x4 week course through 10/2 (given inability to exclude endocarditis). Discussed with ID and ENT, recs appreciated. Obtain pan-CT scan if remains febrile. Pt with new left eye redness, opthalmology consulted especially in the setting of concern for surrounding infectious process.    Pt also with oropharyngeal dysphagia requiring NGTs followed by UGIB s/p EGD (8/31) found to have esophagitis/erosive gastropathy now on PPI BID. Pt further found to have popliteal DVT. No c/i by GI for A/C, pt initiated on heparin drip with patient-specific pTTs. Will need to ultimately transition to Coumadin x3-6 months after PEG placement. Will also need to re-address PEG placement with GI next week after completion of abx/anti-fungal therapy.     Dispo pending medical optimization. Pt full code. DVT ppx full A/C with heparin drip. Will continue to discuss plan of care with pt's family (, HCP) and daughter daily.

## 2023-09-25 NOTE — PROGRESS NOTE ADULT - SUBJECTIVE AND OBJECTIVE BOX
[facility] [phone]        Patient seen and examined trach to vent. She is making some urine.         MEDICATIONS  (STANDING):  acetic acid 0.25% Topical Irrigation 1 Application(s) Topical two times a day  albuterol/ipratropium for Nebulization 3 milliLiter(s) Nebulizer every 6 hours  atorvastatin 40 milliGRAM(s) Oral at bedtime  buMETAnide Infusion 0.5 mG/Hr (2.5 mL/Hr) IV Continuous <Continuous>  chlorhexidine 0.12% Liquid 15 milliLiter(s) Oral Mucosa every 12 hours  chlorhexidine 2% Cloths 1 Application(s) Topical daily  dextrose 5%. 1000 milliLiter(s) (100 mL/Hr) IV Continuous <Continuous>  dextrose 5%. 1000 milliLiter(s) (50 mL/Hr) IV Continuous <Continuous>  dextrose 50% Injectable 25 Gram(s) IV Push once  dextrose 50% Injectable 25 Gram(s) IV Push once  dextrose 50% Injectable 12.5 Gram(s) IV Push once  epoetin odalis (EPOGEN) Injectable 07382 Unit(s) SubCutaneous <User Schedule>  ferrous    sulfate Liquid 300 milliGRAM(s) Enteral Tube daily  fluconAZOLE IVPB 200 milliGRAM(s) IV Intermittent every 24 hours  glucagon  Injectable 1 milliGRAM(s) IntraMuscular once  heparin  Infusion 1050 Unit(s)/Hr (10.5 mL/Hr) IV Continuous <Continuous>  insulin lispro (ADMELOG) corrective regimen sliding scale   SubCutaneous every 6 hours  insulin NPH human recombinant 3 Unit(s) SubCutaneous every 6 hours  midodrine. 10 milliGRAM(s) Oral three times a day  mupirocin 2% Ointment 1 Application(s) Topical two times a day  pantoprazole  Injectable 40 milliGRAM(s) IV Push two times a day  petrolatum Ophthalmic Ointment 1 Application(s) Right EYE two times a day  psyllium Powder 1 Packet(s) Oral daily  sodium chloride 2 Gram(s) Oral two times a day      VITAL:  T(C): , Max: 37.1 (09-25-23 @ 09:24)  T(F): , Max: 98.8 (09-25-23 @ 09:24)  HR: 100 (09-25-23 @ 11:03)  BP: 95/32 (09-25-23 @ 09:24)  BP(mean): --  RR: 16 (09-25-23 @ 09:24)  SpO2: 97% (09-25-23 @ 11:03)  Wt(kg): --    I and O's:    09-24 @ 07:01  -  09-25 @ 07:00  --------------------------------------------------------  IN: 1192 mL / OUT: 700 mL / NET: 492 mL          PHYSICAL EXAM:    Constitutional: trach to vent  HEENT: tracheostomy + NGT  Respiratory: CTAB/L  Cardiovascular: S1 and S2  Gastrointestinal: BS+, soft, NT/ND  Extremities: + peripheral edema  Neurological: reduced generalized strength   : No Wheeler  Skin: No rashes  Access: Not applicable    LABS:                        7.6    5.17  )-----------( 212      ( 25 Sep 2023 05:15 )             24.3     09-25    129<L>  |  90<L>  |  108<H>  ----------------------------<  178<H>  4.5   |  25  |  2.77<H>    Ca    9.3      25 Sep 2023 05:15  Phos  4.0     09-25  Mg     2.50     09-25    TPro  5.4<L>  /  Alb  1.6<L>  /  TBili  <0.2  /  DBili  x   /  AST  31  /  ALT  8   /  AlkPhos  326<H>  09-24          Urine Studies:  Urinalysis Basic - ( 25 Sep 2023 05:15 )    Color: x / Appearance: x / SG: x / pH: x  Gluc: 178 mg/dL / Ketone: x  / Bili: x / Urobili: x   Blood: x / Protein: x / Nitrite: x   Leuk Esterase: x / RBC: x / WBC x   Sq Epi: x / Non Sq Epi: x / Bacteria: x        Assessment and Plan:   · Assessment	    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 ?uremia of late; we attempted HD last week from the shiley that has been in place for the past few weeks; the catheter was not able to be used successfully. Shiley is out now, but placement of new HD catheter would be a potential nidus for further infection    (3)Hyponatremia - low but slowly improving/stable, on NaCl tabs    (4)Pulm- hypercapnic respiratory failure on admission - now s/p trach; remains on vent     (5)ID - s/p IV Meropenem now on fluconazole     (6)Anemia - receiving DAVID and PRBCs intermittently    (7)AMS - at least at times over the past 2-3 days, she has been mentating well; this argues against uremic encephalopathy    RECOMMEND:  (1)No HD for now, attempting efforts to further forgo HD; if we are to re-perform HD, would plan for new shiley placement  (2)NaCl tabs as ordered  (3)Bumex gtt per RCU  (4)Monitor I/Os    Tere Arauz NP  [facility]  [phone]        [facility] [phone]        Patient seen and examined trach to vent. She is making some urine.         MEDICATIONS  (STANDING):  acetic acid 0.25% Topical Irrigation 1 Application(s) Topical two times a day  albuterol/ipratropium for Nebulization 3 milliLiter(s) Nebulizer every 6 hours  atorvastatin 40 milliGRAM(s) Oral at bedtime  buMETAnide Infusion 0.5 mG/Hr (2.5 mL/Hr) IV Continuous <Continuous>  chlorhexidine 0.12% Liquid 15 milliLiter(s) Oral Mucosa every 12 hours  chlorhexidine 2% Cloths 1 Application(s) Topical daily  dextrose 5%. 1000 milliLiter(s) (100 mL/Hr) IV Continuous <Continuous>  dextrose 5%. 1000 milliLiter(s) (50 mL/Hr) IV Continuous <Continuous>  dextrose 50% Injectable 25 Gram(s) IV Push once  dextrose 50% Injectable 25 Gram(s) IV Push once  dextrose 50% Injectable 12.5 Gram(s) IV Push once  epoetin odalis (EPOGEN) Injectable 04422 Unit(s) SubCutaneous <User Schedule>  ferrous    sulfate Liquid 300 milliGRAM(s) Enteral Tube daily  fluconAZOLE IVPB 200 milliGRAM(s) IV Intermittent every 24 hours  glucagon  Injectable 1 milliGRAM(s) IntraMuscular once  heparin  Infusion 1050 Unit(s)/Hr (10.5 mL/Hr) IV Continuous <Continuous>  insulin lispro (ADMELOG) corrective regimen sliding scale   SubCutaneous every 6 hours  insulin NPH human recombinant 3 Unit(s) SubCutaneous every 6 hours  midodrine. 10 milliGRAM(s) Oral three times a day  mupirocin 2% Ointment 1 Application(s) Topical two times a day  pantoprazole  Injectable 40 milliGRAM(s) IV Push two times a day  petrolatum Ophthalmic Ointment 1 Application(s) Right EYE two times a day  psyllium Powder 1 Packet(s) Oral daily  sodium chloride 2 Gram(s) Oral two times a day      VITAL:  T(C): , Max: 37.1 (09-25-23 @ 09:24)  T(F): , Max: 98.8 (09-25-23 @ 09:24)  HR: 100 (09-25-23 @ 11:03)  BP: 95/32 (09-25-23 @ 09:24)  BP(mean): --  RR: 16 (09-25-23 @ 09:24)  SpO2: 97% (09-25-23 @ 11:03)  Wt(kg): --    I and O's:    09-24 @ 07:01  -  09-25 @ 07:00  --------------------------------------------------------  IN: 1192 mL / OUT: 700 mL / NET: 492 mL          PHYSICAL EXAM:    Constitutional: trach to vent  HEENT: tracheostomy + NGT  Respiratory: CTAB/L  Cardiovascular: S1 and S2  Gastrointestinal: BS+, soft, NT/ND  Extremities: + peripheral edema  Neurological: reduced generalized strength   : No Wheeler  Skin: No rashes  Access: Not applicable    LABS:                        7.6    5.17  )-----------( 212      ( 25 Sep 2023 05:15 )             24.3     09-25    129<L>  |  90<L>  |  108<H>  ----------------------------<  178<H>  4.5   |  25  |  2.77<H>    Ca    9.3      25 Sep 2023 05:15  Phos  4.0     09-25  Mg     2.50     09-25    TPro  5.4<L>  /  Alb  1.6<L>  /  TBili  <0.2  /  DBili  x   /  AST  31  /  ALT  8   /  AlkPhos  326<H>  09-24          Urine Studies:  Urinalysis Basic - ( 25 Sep 2023 05:15 )    Color: x / Appearance: x / SG: x / pH: x  Gluc: 178 mg/dL / Ketone: x  / Bili: x / Urobili: x   Blood: x / Protein: x / Nitrite: x   Leuk Esterase: x / RBC: x / WBC x   Sq Epi: x / Non Sq Epi: x / Bacteria: x        Assessment and Plan:   · Assessment	    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 ?uremia of late; we attempted HD last week from the shiley that has been in place for the past few weeks; the catheter was not able to be used successfully. Shiley is out now, but placement of new HD catheter would be a potential nidus for further infection    (3)Hyponatremia - low but slowly improving/stable, on NaCl tabs    (4)Pulm- hypercapnic respiratory failure on admission - now s/p trach; remains on vent     (5)ID - s/p IV Meropenem now on fluconazole     (6)Anemia - receiving DAVID and PRBCs intermittently    (7)AMS - at least at times over the past 2-3 days, she has been mentating well; this argues against uremic encephalopathy    RECOMMEND:  (1)No HD for now, attempting efforts to further forgo HD; if we are to re-perform HD, would plan for new shiley placement  (2)NaCl tabs as ordered  (3)Bumex gtt per RCU  (4)Monitor I/Os    Tere Arauz NP  [facility]  [phone]       RENAL ATTENDING NOTE  Patient seen and examined with NP. Agree with assessment and plan as above.    Jimmy Colon MD  [facility]  [phone]

## 2023-09-25 NOTE — PROGRESS NOTE ADULT - SUBJECTIVE AND OBJECTIVE BOX
Follow Up:      Inverval History/ROS:Patient is a 73y old  Female who presents with a chief complaint of Respiratory distress (25 Sep 2023 13:30)    No fever  Trach    Allergies    isoniazid (Rash)  nafcillin (Unknown)  hydrALAZINE (Rash)  vitamin E (Short breath; Urticaria; Hives)  doxycycline (Rash)  cefepime (Rash)  NIFEdipine (Urticaria; Hives)    Intolerances        ANTIMICROBIALS:  fluconAZOLE IVPB 200 every 24 hours      OTHER MEDS:  acetaminophen   Oral Liquid .. 650 milliGRAM(s) Oral every 6 hours PRN  acetic acid 0.25% Topical Irrigation 1 Application(s) Topical two times a day  albuterol/ipratropium for Nebulization 3 milliLiter(s) Nebulizer every 6 hours  artificial tears (preservative free) Ophthalmic Solution 1 Drop(s) Both EYES three times a day PRN  atorvastatin 40 milliGRAM(s) Oral at bedtime  buMETAnide Infusion 0.5 mG/Hr IV Continuous <Continuous>  calamine/zinc oxide Lotion 1 Application(s) Topical two times a day PRN  chlorhexidine 0.12% Liquid 15 milliLiter(s) Oral Mucosa every 12 hours  chlorhexidine 2% Cloths 1 Application(s) Topical daily  dextrose 5%. 1000 milliLiter(s) IV Continuous <Continuous>  dextrose 5%. 1000 milliLiter(s) IV Continuous <Continuous>  dextrose 50% Injectable 12.5 Gram(s) IV Push once  dextrose 50% Injectable 25 Gram(s) IV Push once  dextrose 50% Injectable 25 Gram(s) IV Push once  dextrose Oral Gel 15 Gram(s) Oral once PRN  epoetin odalis (EPOGEN) Injectable 20363 Unit(s) SubCutaneous <User Schedule>  ferrous    sulfate Liquid 300 milliGRAM(s) Enteral Tube daily  glucagon  Injectable 1 milliGRAM(s) IntraMuscular once  heparin  Infusion 1050 Unit(s)/Hr IV Continuous <Continuous>  insulin lispro (ADMELOG) corrective regimen sliding scale   SubCutaneous every 6 hours  insulin NPH human recombinant 3 Unit(s) SubCutaneous every 6 hours  midodrine. 10 milliGRAM(s) Oral three times a day  mupirocin 2% Ointment 1 Application(s) Topical two times a day  pantoprazole  Injectable 40 milliGRAM(s) IV Push two times a day  petrolatum Ophthalmic Ointment 1 Application(s) Right EYE two times a day  psyllium Powder 1 Packet(s) Oral daily  sodium chloride 2 Gram(s) Oral two times a day      Vital Signs Last 24 Hrs  T(C): 36.3 (25 Sep 2023 12:45), Max: 37.1 (25 Sep 2023 09:24)  T(F): 97.4 (25 Sep 2023 12:45), Max: 98.8 (25 Sep 2023 09:24)  HR: 98 (25 Sep 2023 12:45) (97 - 105)  BP: 90/63 (25 Sep 2023 12:45) (90/63 - 108/51)  BP(mean): --  RR: 16 (25 Sep 2023 12:45) (16 - 18)  SpO2: 94% (25 Sep 2023 12:45) (94% - 99%)    Parameters below as of 25 Sep 2023 12:45  Patient On (Oxygen Delivery Method): ventilator    O2 Concentration (%): 35    PHYSICAL EXAM:  General: [ ] non-toxic  HEAD/EYES: [ ] PERRL xx[ ] white sclera [ ] icterus  ENT:  [ ] normal [ ] supple [ ] thrush [ ] pharyngeal exudate  Cardiovascular:   [ ] murmur [x ] normal [ ] PPM/AICD  Respiratory:  [ x] clear to ausculation bilaterally  GI:  x[ ] soft, non-tender, normal bowel sounds  :  [ ] willard [ ] no CVA tenderness   Musculoskeletal:  [ ] no synovitis  Neurologic:  [ ] non-focal exam   Skin:  [x ] no rash  Lymph: [ ] no lymphadenopathy  Psychiatric:  [ ] appropriate affect [ ] alert & oriented  Lines:  [ x] no phlebitis [ ] central line                                7.6    5.17  )-----------( 212      ( 25 Sep 2023 05:15 )             24.3       09-25    129<L>  |  90<L>  |  108<H>  ----------------------------<  178<H>  4.5   |  25  |  2.77<H>    Ca    9.3      25 Sep 2023 05:15  Phos  4.0     09-25  Mg     2.50     09-25    TPro  5.4<L>  /  Alb  1.6<L>  /  TBili  <0.2  /  DBili  x   /  AST  31  /  ALT  8   /  AlkPhos  326<H>  09-24      Urinalysis Basic - ( 25 Sep 2023 05:15 )    Color: x / Appearance: x / SG: x / pH: x  Gluc: 178 mg/dL / Ketone: x  / Bili: x / Urobili: x   Blood: x / Protein: x / Nitrite: x   Leuk Esterase: x / RBC: x / WBC x   Sq Epi: x / Non Sq Epi: x / Bacteria: x        MICROBIOLOGY:Culture Results:   No growth at 4 days (09-20-23 @ 19:26)  Culture Results:   No growth at 4 days (09-20-23 @ 12:09)      RADIOLOGY:     Spoke with pt he is aware again

## 2023-09-25 NOTE — PROGRESS NOTE ADULT - ASSESSMENT
73 f with DM, HTN, CKD, breast CA, latent TB (treated first with INH then had rash and switched to rifampin), MSSA bacteremia, c-diff, respiratory arrest and cardiac arrest (2018), s/p trach/PEG then removed, CVA with residual weakness, aspiration PNA, 1/4 sputums had MAC 7/2023, admitted 8/11 with respiratory failure no fever or WBC but was intubated and then spiked  blood cx negative, sputum cx with MSSA    s/p vanco and aztreonam 8/11=> s/p cefepime 8/12 and had ?rash => s/p cefazolin 8/13-8/14  head MRI 8/17 with acute cerebellar infarct  had renal failure, AIN? family declined renal biopsy started on prednisone  s/p trach 9/1 and BAL with MSSA   ET cx with ESBL and MSSA  started on ana cristina 9/1  blood cx 9/1: MSSA   repeat negative 9/4, 9/8  CXR with b/l opacities, R increased  L ear edema and otitis externa    initial  resp failure for ?fluid over load but also had MSSA in the sputum, got vanco, aztreonam one day  then cefepime and had rash, renal failure which was considered due to cefepime and then received 2 days of cefazolin and then off    now with trach and bronc on 9/1 with MSSA bacteremia and BAL also MSSA  another ET cx with MSSA and ESBL E-coli    hypotension, MSSA bacteremia likely due to pneumonia, repeat blood cx negative 9/4, TTE limited unable to exclude endocarditis    L otitis externa on ana cristina since 9/1 but worsening edema and black discoloration with bullae forming and persistent fever (ear cx was negative)    Ct showed acute on chronic otitis externa and otomastoiditis , superimposed cholesteatoma can not be excluded, no malignant otitis externa    pt again febrile, ENT stated there was black spores which could be a fungal infection and the last cx showed candida albicans  bleeding from HD site and ilene removed   new erythematous patchy rash    * pt already had >3 weeks of ana cristina and now with a new rash, will discontinue  * c/w fluconazole 200 qd, started 9/21    * Continue vancomycin - dose by level  * will need a 4 week course of vanco for the MSSA bacteremia from the negative blood cx through 10/2    * monitor CBC/diff and renal function

## 2023-09-25 NOTE — PROGRESS NOTE ADULT - SUBJECTIVE AND OBJECTIVE BOX
Subjective: Patient seen and examined. No new events except as noted.    at bedside.  Remains in RCU.    REVIEW OF SYSTEMS:  Unable to obtain.      MEDICATIONS:  MEDICATIONS  (STANDING):  acetic acid 0.25% Topical Irrigation 1 Application(s) Topical two times a day  albuterol/ipratropium for Nebulization 3 milliLiter(s) Nebulizer every 6 hours  atorvastatin 40 milliGRAM(s) Oral at bedtime  buMETAnide Injectable 2 milliGRAM(s) IV Push two times a day  chlorhexidine 0.12% Liquid 15 milliLiter(s) Oral Mucosa every 12 hours  chlorhexidine 2% Cloths 1 Application(s) Topical daily  dextrose 5%. 1000 milliLiter(s) (50 mL/Hr) IV Continuous <Continuous>  dextrose 5%. 1000 milliLiter(s) (100 mL/Hr) IV Continuous <Continuous>  dextrose 50% Injectable 12.5 Gram(s) IV Push once  dextrose 50% Injectable 25 Gram(s) IV Push once  dextrose 50% Injectable 25 Gram(s) IV Push once  epoetin odalis (EPOGEN) Injectable 39062 Unit(s) SubCutaneous <User Schedule>  ferrous    sulfate Liquid 300 milliGRAM(s) Enteral Tube daily  fluconAZOLE IVPB 200 milliGRAM(s) IV Intermittent every 24 hours  glucagon  Injectable 1 milliGRAM(s) IntraMuscular once  heparin  Infusion 1050 Unit(s)/Hr (10.5 mL/Hr) IV Continuous <Continuous>  insulin lispro (ADMELOG) corrective regimen sliding scale   SubCutaneous every 6 hours  insulin NPH human recombinant 3 Unit(s) SubCutaneous every 6 hours  midodrine. 10 milliGRAM(s) Oral three times a day  mupirocin 2% Ointment 1 Application(s) Topical two times a day  pantoprazole  Injectable 40 milliGRAM(s) IV Push two times a day  psyllium Powder 1 Packet(s) Oral daily  sodium chloride 2 Gram(s) Oral two times a day    PHYSICAL EXAM:  Vital Signs Last 24 Hrs  T(C): 37.4 (25 Sep 2023 15:53), Max: 37.4 (25 Sep 2023 15:53)  T(F): 99.4 (25 Sep 2023 15:53), Max: 99.4 (25 Sep 2023 15:53)  HR: 99 (25 Sep 2023 15:53) (96 - 105)  BP: 87/34 (25 Sep 2023 15:53) (87/34 - 108/51)  BP(mean): --  RR: 16 (25 Sep 2023 15:53) (16 - 16)  SpO2: 96% (25 Sep 2023 15:53) (94% - 98%)    Parameters below as of 25 Sep 2023 15:53  Patient On (Oxygen Delivery Method): ventilator    O2 Concentration (%): 35    I&O's Summary    24 Sep 2023 07:01  -  25 Sep 2023 07:00  --------------------------------------------------------  IN: 1192 mL / OUT: 700 mL / NET: 492 mL    Appearance: NAD, +trach  HEENT: dry oral mucosa  Lymphatic: No lymphadenopathy  Cardiovascular: Normal S1 S2, No JVD, No murmurs, No edema  Respiratory: Decreased BS, + trach to vent	  Neuro: opens eyes  Gastrointestinal: Soft, Non-tender, + BS, + NGT  Skin: No rashes, No ecchymoses, No cyanosis	  Extremities: No strength/ROM 2/2 sedation, + BL LE edema  Vascular: Peripheral pulses palpable 2+ bilaterally    Mode: AC/ CMV (Assist Control/ Continuous Mandatory Ventilation), RR (machine): 17, TV (machine): 340, FiO2: 35, PEEP: 8, ITime: 0.84, MAP: 15, PIP: 46    LABS:    CARDIAC MARKERS:                        7.6    5.17  )-----------( 212      ( 25 Sep 2023 05:15 )             24.3     09-25    129<L>  |  90<L>  |  108<H>  ----------------------------<  178<H>  4.5   |  25  |  2.77<H>    Ca    9.3      25 Sep 2023 05:15  Phos  4.0     09-25  Mg     2.50     09-25    TPro  5.4<L>  /  Alb  1.6<L>  /  TBili  <0.2  /  DBili  x   /  AST  31  /  ALT  8   /  AlkPhos  326<H>  09-24    proBNP:   Lipid Profile:   HgA1c:   TSH:     TELEMETRY: 	    ECG:  	  RADIOLOGY:   DIAGNOSTIC TESTING:  [ ] Echocardiogram:  [ ]  Catheterization:  [ ] Stress Test:    OTHER:

## 2023-09-25 NOTE — PROGRESS NOTE ADULT - ASSESSMENT
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 While in MICU patient noted with worsening renal failure requiring HD initiation, CGE/ Melena s/p EGD 8/31 found with esophagitis and erosive gastropathy, new right cerebellar infarct and fevers second to ESBL COLI and MSSA VAP, MSSA bacteremia, left ear otitis externa and drug rxn to cephalosporins Course further complicated by prolonged vent time s/p tracheostomy 9/1 and transferred to RCU 9/3 completed by recurrent fevers with high PIP, respiratory acidosis and concern for volume overloaded.     NEUROLOGY  # Metabolic Encephalopath vs NEW cerebella infarct   - Baseline MS AOX3 with short term memory changes per family however noted with concern for poor mentation in ICU  - MRI (8/17) with NEW right cerebellar infarct and old left PCA/occipital infarcts  - STEPHANIE (8/17) with AV showing two linear mobile echogenic elements attached to valve leaflets consistent with Lambel's excrescence, but no TRINITY thrombus, and lambel's excrescence with uncertain clinical implication.   - EEG (8/11-8/15) with no seizures   - ILR interrogation (9/7) with SR and PACs falsely recorded as AF.   - Attempted to resume ASA for medical management but held given GIB   - Continue on Lipitor for medical management   - Course complicated by questionable head and body shaking 9/8 however prolactin elevated and shakes head yes/no to some questions.   - Lethargy noted    CARDIOVASCULAR  # HTN Urgency   # Septic vs vasoplegic shock   - Labile BPs ranging in between SBP 80s-200s and attempted labetalol, however noted with hypotension and bradycardia likely from BB toxicity vs shock state?    - Holding Labetalol and Catapres   - c/w Cardura for now   - Hypotensive in the 80s systolic overnight, requiring Midodrine 20mg x 1 (resolved)   - Stable today, still mildly hypotensive, but no intervention at this time     # Hx of HFpEF with likely ADHF with volume overload.   - ECHO (7/2023) with EF 59 with severe LVH and diastolic dysfunction   - STEPHANIE (8/18) with normal LVRVSF however noted with increased LA pressures and persistent LA septal bowing into RA.   - ECHO (8/11) with EF 60 with mild LVH, normal LVRVSF, and mild ddfxn.  - Remove volume with HD sessions and intermittent bumex pushes as BP allows    - c/w Bumex 2mg IV BID for now - Monitor UOP and electrolytes. Intermittently held due to low BP    HEENT   # Left ear OE   - ENT evaluation (9/7) with no mastoid tenderness, however found with positive swelling and excoriation to left auricle and tenderness to manipulation of auricle. Debrided crusting of auricle and EAC evaluated with edema and unable to visualize TM. EAC debrided and found with watery exudate.   - s/p CiproHC (9/5 - 9/16)   - Bradford discontinued. Rash on torso concerning for possible drug rash  - ENT following and ear wick change QD   - Wick out but needs ? Debridement wound care called/fu ent   - ENT recommending CT IAC w/ IV con but family is refusing as concerned given CKD, kidneys wouldn't recover from IV contrast. Spoke with Nephrology, ENT, and patient's  at length. Planned for CT non con and per , decision will be made from there but for now doesn't want to risk further harming kidneys.  - CT IAC showing acute on chronic left-sided otitis externa and acute on chronic left-sided otomastoiditis. Superimposed left-sided tympanosclerosis. Superimposed cholesteatoma formation within the left tympanomastoid cavity cannot be excluded. No evidence of malignant otitis externa.  - ENT consulted (9/20) for white purulent discharge from left ear, recc: ENT:  acetic acid drops BID to left ear. Mupirocin ointment to the left pinna BID, cover with xeroform after placing mupirocin ointment. Pressure offloading of the left ear.    # Oral Lesions with questionable Zoster vs Trauma?   - Patient with tongue pain and seen with anterior ulcerations consolidating and crusting and posterior ulceration.  - Case discussed with pathology resident and culture/ cytology sent.   - HSV negative and Path (9/6) with normal appearing epithelial cells singly and in clusters devoid of viral cytopathic effect.   - Continue on magic mouth wash for pain    RESPIRATORY  # AHHRF second to volume overload   - Hx of CKD and HFpEF presented with SOB and hypercarbia second to volume overload requiring intubation and HD initiation   - Vent weaning attempted however limited requiring tracheostomy (9/1) with IP   - Course complicated by PIPs elevated (50s) and respiratory acidosis second to secretions vs volume ?    - Vent adjusted 20/300/5/30 without improvement.   - POCUS (9/8) with BL pleural effusions (large on right and small on left)   - CT CHEST (9/8) with TBM, BL pleural effusions and continued consolidations   - Continue on vent and given TBM increased PEEP (9/9) to 20/300/8/40 with overall improvement   - Continue on Duoneb and HTS for airway clearance   - Continue volume removal with diuresis and aggressive UF sessions.   - Discussing possible thora but appears improved and will monitor.  ]  - Bedside US showing decrease in pleural effusion - hold off thoracentesis 9/10     GI  # UGIB   - CGE x 1 episode on 8/24 and melena x 2 episodes on 8/31 likely residual blood.   - EGD (8/31) found with esophagitis and erosive gastropathy  - Continue on PPI BID through late October and then PPI QD   - No melena     # Dysphagia   - NGT-TF continued   - Pending PEG however needs improvement in infectious status prior to placement.     RENAL  # Progressive CKD   - Presented volume overload and progressive RUSS on CKD (baseline CRE in 2s and mode into the 3s on admission) likely obstruction vs low flow state with shock vs AIN from drug reaction issue?  - POCUS with no signs of hydronephrosis   - Pressor support started with improvement in renal function  - Attempted steroid course in ICU without improvement in renal function   - Case discussed at length with renal and initiated on HD in ICU and now continued on HD TTHS, however remains volume overloaded.   - Patient oliguric however slightly responding to Bumex pushes - does not make enough urine   - Nephro following - Plan to resume HD TIW - Nephro recc holding HD alissa (9/21)   - Monitor renal function and UOP, and electrolytes for now  - Monitor urine output - Will perform bladder scan/straight cath as needed if retaining urine     Hyponatremia (improving) Na 123>>125  - c/w Salt tabs - s/p Hypertonic 1.5%NS @ 50cc/hr x 5 hr  - Renal- Will resume HD i/s/o of rising BUN, poor urine output on Bumex IVP     # Hyperphosphatemia (resolved)   - PTH normal and elevated phos likely from renal failure  - s/p Phos binder with Renvela - now d'beth as level wnl      INFECTIOUS DISEASE  # ESBL ECOLI and MSSA VAP c/b MSSA bacteremia   - RVP/ COVID (8/11) negative   - SCx (8/11) with MSSA s/p cefepime (8/12-8/13) and then ancef (8/14) however noted with drug reaction and then changed to vanco and aztreonam (8/12-8/13) in ICU .   - Course complicated by recurrent fevers and return of MSSA with bacteremia.   - SCx (9/1) with MSSA and ESBL ECOLI   - BAL (9/1) with MSSA   - BCx (9/1) with MSSA and cleared on (9/4)   - ECHO (9/6) unable to rule out endocarditis   - Continue on Bradford (9/4 - ) and Vanco BY DOSE POST HD (9/4, 9/7, 9/9 ...) with duration TBD at this time.   - Given inability to rule out endocarditis will discuss possible 4 wks with ID?   - Course complicated by fever (9/7) and UA with leuks but no bacteria, however compared to prior improved, RVP/COVID and SCx negative, and RPT CXR similar to prior   - LE DOPPLERS- Blood and sputum cx NTD; Acute left deep vein thrombosis of the left popliteal vein.  - Febrile overnight 102 recultured and sent off   -Pt receiving vanco post HD however today given vanco 750mg x 1 will check Vanco level at end of HD session and dose   - BCx (9/20): sent- NTD  - ID recc CT C/A/P w/ IV contrast - on hold d/t unsure if pt tolerate HD alissa  - C/w fluconazole 200 qd (9/21)    # Left ear OE   - ENT evaluation (9/7) with no mastoid tenderness, however found with positive swelling and excoriation to left auricle and tenderness to manipulation of auricle. Debrided crusting of auricle and EAC evaluated with edema and unable to visualize TM. EAC debrided and found with watery exudate.   - s/p CiproHC (9/5 - 9/16)     # MRSA PCRs   - MRSA PCR (8/11 and 8/14) negative   - Next MRSA + PCR ordered for 10/8     HEME  # Anemia second to GIB vs renal disease   - Hold chemical DVT PPX given bleeding/ waxing and waning HH   - s/p multiple units of PRBCs (8/15, 8/21, 8/28, 8/31 and 9/8)   - Anemia panel with AOCD but with low %sat and ferrous sulfate added to optimize   - Monitor HH and discuss with renal for EPO sometime next week.   - Spoke with GI for clearance to start Heparin gtt for + DVT   - Restarted heparin gtt (9/21)    Vascular  # DVT  - Pt with + popliteal DVT on SCD Spoke with GI no absolute contraindication for starting Heparin - advise close monitoring for bleeding - Do stat CTA if bleeding occurs and call IR   - Pt specific heparin gtt started with goal PTT 60-85  - will monitor closely   - Restarted heparin gtt (9/21)    ONC   # Hx of Breast CA   - Patient dx in 2018 and s/p BL mastectomy (radical on right) and chemo and RT.   - Supportive care.     ENDOCRINE  # IDDM2   - Continue on NPH 3U and ISS   - Monitor FS and adjust as needed     LINES/ TUBES  - R ARTHUR TAYLOR (8/19 - 9/21)     SKIN  # Drug Eruption   - Attempted cefepime (8/12) vs ancef (8/13-8/14) and then noted with drug rxn.   - Hold further cephalosporins at this time   - s/p Steroids with prednisone 40 (8/18 - 9/2) then prednisone 30 (9/3-9/7), then prednisone 20 (9/8 - 9/10), then prednisone 10mg (9/11-9/13) then turn off.     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 While in MICU patient noted with worsening renal failure requiring HD initiation, CGE/ Melena s/p EGD 8/31 found with esophagitis and erosive gastropathy, new right cerebellar infarct and fevers second to ESBL COLI and MSSA VAP, MSSA bacteremia, left ear otitis externa and drug rxn to cephalosporins Course further complicated by prolonged vent time s/p tracheostomy 9/1 and transferred to RCU 9/3 completed by recurrent fevers with high PIP, respiratory acidosis and concern for volume overloaded.     NEUROLOGY  # Metabolic Encephalopath vs NEW cerebella infarct   - Baseline MS AOX3 with short term memory changes per family however noted with concern for poor mentation in ICU  - MRI (8/17) with NEW right cerebellar infarct and old left PCA/occipital infarcts  - STEPHANIE (8/17) with AV showing two linear mobile echogenic elements attached to valve leaflets consistent with Lambel's excrescence, but no TRINITY thrombus, and lambel's excrescence with uncertain clinical implication.   - EEG (8/11-8/15) with no seizures   - ILR interrogation (9/7) with SR and PACs falsely recorded as AF.   - Attempted to resume ASA for medical management but held given GIB   - Continue on Lipitor for medical management   - Course complicated by questionable head and body shaking 9/8 however prolactin elevated and shakes head yes/no to some questions.   - Lethargy noted - Pending Rpt CT Head    CARDIOVASCULAR  # HTN Urgency   # Septic vs vasoplegic shock   - Labile BPs ranging in between SBP 80s-200s and attempted labetalol, however noted with hypotension and bradycardia likely from BB toxicity vs shock state?    - Holding Labetalol and Catapres   - c/w Cardura for now   - Hypotensive in the 80s systolic overnight, requiring Midodrine 20mg x 1 (resolved)   - Stable today, still mildly hypotensive, but no intervention at this time     # Hx of HFpEF with likely ADHF with volume overload.   - ECHO (7/2023) with EF 59 with severe LVH and diastolic dysfunction   - STEPHANIE (8/18) with normal LVRVSF however noted with increased LA pressures and persistent LA septal bowing into RA.   - ECHO (8/11) with EF 60 with mild LVH, normal LVRVSF, and mild ddfxn.  - Remove volume with HD sessions and intermittent bumex pushes as BP allows    - s/p Bumex 2mg IV BID for now  - Will start Bumex gtt at 0.5mg/hr - Monitor UOP and electrolytes    HEENT   # Left ear OE   - ENT evaluation (9/7) with no mastoid tenderness, however found with positive swelling and excoriation to left auricle and tenderness to manipulation of auricle. Debrided crusting of auricle and EAC evaluated with edema and unable to visualize TM. EAC debrided and found with watery exudate.   - s/p CiproHC (9/5 - 9/16)   - Bradford discontinued. Rash on torso concerning for possible drug rash  - ENT following and ear wick change QD   - Wick out but needs ? Debridement wound care called/fu ent   - ENT recommending CT IAC w/ IV con but family is refusing as concerned given CKD, kidneys wouldn't recover from IV contrast. Spoke with Nephrology, ENT, and patient's  at length. Planned for CT non con and per , decision will be made from there but for now doesn't want to risk further harming kidneys.  - CT IAC showing acute on chronic left-sided otitis externa and acute on chronic left-sided otomastoiditis. Superimposed left-sided tympanosclerosis. Superimposed cholesteatoma formation within the left tympanomastoid cavity cannot be excluded. No evidence of malignant otitis externa.  - ENT consulted (9/20) for white purulent discharge from left ear, recc: ENT:  acetic acid drops BID to left ear. Mupirocin ointment to the left pinna BID, cover with xeroform after placing mupirocin ointment. Pressure offloading of the left ear.  + L eye redness in setting of surrounding infectious process - Ophthalmology consulted via team, recs pending     # Oral Lesions with questionable Zoster vs Trauma?   - Patient with tongue pain and seen with anterior ulcerations consolidating and crusting and posterior ulceration.  - Case discussed with pathology resident and culture/ cytology sent.   - HSV negative and Path (9/6) with normal appearing epithelial cells singly and in clusters devoid of viral cytopathic effect.   - Continue on magic mouth wash for pain    RESPIRATORY  # AHHRF second to volume overload   - Hx of CKD and HFpEF presented with SOB and hypercarbia second to volume overload requiring intubation and HD initiation   - Vent weaning attempted however limited requiring tracheostomy (9/1) with IP   - Course complicated by PIPs elevated (50s) and respiratory acidosis second to secretions vs volume ?    - Vent adjusted 20/300/5/30 without improvement.   - POCUS (9/8) with BL pleural effusions (large on right and small on left)   - CT CHEST (9/8) with TBM, BL pleural effusions and continued consolidations   - Continue on vent and given TBM increased PEEP (9/9) to 20/300/8/40 with overall improvement   - Continue on Duoneb and HTS for airway clearance   - Continue volume removal with diuresis and aggressive UF sessions.   - Discussing possible thora but appears improved and will monitor.  ]  - Bedside US showing decrease in pleural effusion - hold off thoracentesis 9/10     GI  # UGIB   - CGE x 1 episode on 8/24 and melena x 2 episodes on 8/31 likely residual blood.   - EGD (8/31) found with esophagitis and erosive gastropathy  - Continue on PPI BID through late October and then PPI QD   - No melena     # Dysphagia   - NGT-TF continued   - Pending PEG however needs improvement in infectious status prior to placement.     RENAL  # Progressive CKD   - Presented volume overload and progressive RUSS on CKD (baseline CRE in 2s and mode into the 3s on admission) likely obstruction vs low flow state with shock vs AIN from drug reaction issue?  - POCUS with no signs of hydronephrosis   - Pressor support started with improvement in renal function  - Attempted steroid course in ICU without improvement in renal function   - Case discussed at length with renal and initiated on HD in ICU and now continued on HD TTHS, however remains volume overloaded.   - Nephro following - Plan to resume HD TIW - Nephro recc holding HD alissa (9/21)   - Monitor renal function and UOP, and electrolytes for now  - Monitor urine output - Will perform bladder scan/straight cath as needed if retaining urine   - Nephro recs to start on Bumex gtt - Will monitor BMP closely     Hyponatremia (improving) Na 123>>125  - c/w Salt tabs - s/p Hypertonic 1.5%NS @ 50cc/hr x 5 hr  - Renal- no plan for HD at this time - Plan to start on Bumex gtt    # Hyperphosphatemia (resolved)   - PTH normal and elevated phos likely from renal failure  - s/p Phos binder with Renvela - now d'beth as level wnl      INFECTIOUS DISEASE  # ESBL ECOLI and MSSA VAP c/b MSSA bacteremia   - RVP/ COVID (8/11) negative   - SCx (8/11) with MSSA s/p cefepime (8/12-8/13) and then ancef (8/14) however noted with drug reaction and then changed to vanco and aztreonam (8/12-8/13) in ICU .   - Course complicated by recurrent fevers and return of MSSA with bacteremia.   - SCx (9/1) with MSSA and ESBL ECOLI   - BAL (9/1) with MSSA   - BCx (9/1) with MSSA and cleared on (9/4)   - ECHO (9/6) unable to rule out endocarditis   - Continue on Bradford (9/4 - ) and Vanco BY DOSE POST HD (9/4, 9/7, 9/9 ...) with duration TBD at this time.   - Given inability to rule out endocarditis will discuss possible 4 wks with ID?   - Course complicated by fever (9/7) and UA with leuks but no bacteria, however compared to prior improved, RVP/COVID and SCx negative, and RPT CXR similar to prior   - LE DOPPLERS- Blood and sputum cx NTD; Acute left deep vein thrombosis of the left popliteal vein.  - Febrile overnight 102 recultured and sent off   -Pt receiving vanco post HD however today given vanco 750mg x 1 will check Vanco level at end of HD session and dose   - BCx (9/20): sent- NTD  - ID recc CT C/A/P w/ IV contrast - on hold d/t unsure if pt tolerate HD alissa  - C/w fluconazole 200 qd (9/21)    # Left ear OE   - ENT evaluation (9/7) with no mastoid tenderness, however found with positive swelling and excoriation to left auricle and tenderness to manipulation of auricle. Debrided crusting of auricle and EAC evaluated with edema and unable to visualize TM. EAC debrided and found with watery exudate.   - s/p CiproHC (9/5 - 9/16)     # MRSA PCRs   - MRSA PCR (8/11 and 8/14) negative   - Next MRSA + PCR ordered for 10/8     HEME  # Anemia second to GIB vs renal disease   - Hold chemical DVT PPX given bleeding/ waxing and waning HH   - s/p multiple units of PRBCs (8/15, 8/21, 8/28, 8/31 and 9/8)   - Anemia panel with AOCD but with low %sat and ferrous sulfate added to optimize   - Monitor HH and discuss with renal for EPO sometime next week.   - Spoke with GI for clearance to start Heparin gtt for + DVT   - c/w Heparin gtt (9/21)    Vascular  # DVT  - Pt with + popliteal DVT on SCD Spoke with GI no absolute contraindication for starting Heparin - advise close monitoring for bleeding - Do stat CTA if bleeding occurs and call IR   - Pt specific heparin gtt started with goal PTT 60-85  - will monitor closely   - c/w Heparin gtt (9/21)    ONC   # Hx of Breast CA   - Patient dx in 2018 and s/p BL mastectomy (radical on right) and chemo and RT.   - Supportive care.     ENDOCRINE  # IDDM2   - Continue on NPH 3U and ISS   - Monitor FS and adjust as needed     LINES/ TUBES  - R IJ CLAUDIA (8/19 - 9/21)     SKIN  # Drug Eruption   - Attempted cefepime (8/12) vs ancef (8/13-8/14) and then noted with drug rxn.   - Hold further cephalosporins at this time   - s/p Steroids with prednisone 40 (8/18 - 9/2) then prednisone 30 (9/3-9/7), then prednisone 20 (9/8 - 9/10), then prednisone 10mg (9/11-9/13) then turn off.     ETHICS/ GOC    - FULL CODE     DISPO - TBD

## 2023-09-25 NOTE — PROGRESS NOTE ADULT - SUBJECTIVE AND OBJECTIVE BOX
CHIEF COMPLAINT: Patient is a 73y old  Female who presents with a chief complaint of Respiratory distress (24 Sep 2023 18:08)      Interval Events:    REVIEW OF SYSTEMS:  [ ] All other systems negative  [ ] Unable to assess ROS because ________    Mode: AC/ CMV (Assist Control/ Continuous Mandatory Ventilation), RR (machine): 15, TV (machine): 350, FiO2: 35, PEEP: 8, ITime: 0.62, MAP: 15, PIP: 46      OBJECTIVE:  ICU Vital Signs Last 24 Hrs  T(C): 37.1 (25 Sep 2023 09:24), Max: 37.1 (25 Sep 2023 09:24)  T(F): 98.8 (25 Sep 2023 09:24), Max: 98.8 (25 Sep 2023 09:24)  HR: 101 (25 Sep 2023 09:24) (97 - 109)  BP: 95/32 (25 Sep 2023 09:24) (92/36 - 109/25)  BP(mean): --  ABP: --  ABP(mean): --  RR: 16 (25 Sep 2023 09:24) (16 - 18)  SpO2: 97% (25 Sep 2023 09:24) (95% - 99%)    O2 Parameters below as of 25 Sep 2023 09:24  Patient On (Oxygen Delivery Method): ventilator    O2 Concentration (%): 35      Mode: AC/ CMV (Assist Control/ Continuous Mandatory Ventilation), RR (machine): 15, TV (machine): 350, FiO2: 35, PEEP: 8, ITime: 0.62, MAP: 15, PIP: 46    09-24 @ 07:01  -  09-25 @ 07:00  --------------------------------------------------------  IN: 1192 mL / OUT: 700 mL / NET: 492 mL      CAPILLARY BLOOD GLUCOSE      POCT Blood Glucose.: 187 mg/dL (25 Sep 2023 05:14)      HOSPITAL MEDICATIONS:  MEDICATIONS  (STANDING):  acetic acid 0.25% Topical Irrigation 1 Application(s) Topical two times a day  albuterol/ipratropium for Nebulization 3 milliLiter(s) Nebulizer every 6 hours  atorvastatin 40 milliGRAM(s) Oral at bedtime  buMETAnide Injectable 2 milliGRAM(s) IV Push two times a day  chlorhexidine 0.12% Liquid 15 milliLiter(s) Oral Mucosa every 12 hours  chlorhexidine 2% Cloths 1 Application(s) Topical daily  dextrose 5%. 1000 milliLiter(s) (50 mL/Hr) IV Continuous <Continuous>  dextrose 5%. 1000 milliLiter(s) (100 mL/Hr) IV Continuous <Continuous>  dextrose 50% Injectable 12.5 Gram(s) IV Push once  dextrose 50% Injectable 25 Gram(s) IV Push once  dextrose 50% Injectable 25 Gram(s) IV Push once  epoetin odalis (EPOGEN) Injectable 23314 Unit(s) SubCutaneous <User Schedule>  ferrous    sulfate Liquid 300 milliGRAM(s) Enteral Tube daily  fluconAZOLE IVPB 200 milliGRAM(s) IV Intermittent every 24 hours  glucagon  Injectable 1 milliGRAM(s) IntraMuscular once  heparin  Infusion 1050 Unit(s)/Hr (10.5 mL/Hr) IV Continuous <Continuous>  insulin lispro (ADMELOG) corrective regimen sliding scale   SubCutaneous every 6 hours  insulin NPH human recombinant 3 Unit(s) SubCutaneous every 6 hours  midodrine. 10 milliGRAM(s) Oral three times a day  mupirocin 2% Ointment 1 Application(s) Topical two times a day  pantoprazole  Injectable 40 milliGRAM(s) IV Push two times a day  petrolatum Ophthalmic Ointment 1 Application(s) Right EYE two times a day  psyllium Powder 1 Packet(s) Oral daily  sodium chloride 2 Gram(s) Oral two times a day    MEDICATIONS  (PRN):  acetaminophen   Oral Liquid .. 650 milliGRAM(s) Oral every 6 hours PRN Temp greater or equal to 38C (100.4F), Mild Pain (1 - 3)  artificial tears (preservative free) Ophthalmic Solution 1 Drop(s) Both EYES three times a day PRN Dry Eyes  calamine/zinc oxide Lotion 1 Application(s) Topical two times a day PRN Rash and/or Itching  dextrose Oral Gel 15 Gram(s) Oral once PRN Blood Glucose LESS THAN 70 milliGRAM(s)/deciliter      LABS:                        7.6    5.17  )-----------( 212      ( 25 Sep 2023 05:15 )             24.3     09-25    129<L>  |  90<L>  |  108<H>  ----------------------------<  178<H>  4.5   |  25  |  2.77<H>    Ca    9.3      25 Sep 2023 05:15  Phos  4.0     09-25  Mg     2.50     09-25    TPro  5.4<L>  /  Alb  1.6<L>  /  TBili  <0.2  /  DBili  x   /  AST  31  /  ALT  8   /  AlkPhos  326<H>  09-24      Urinalysis Basic - ( 25 Sep 2023 05:15 )    Color: x / Appearance: x / SG: x / pH: x  Gluc: 178 mg/dL / Ketone: x  / Bili: x / Urobili: x   Blood: x / Protein: x / Nitrite: x   Leuk Esterase: x / RBC: x / WBC x   Sq Epi: x / Non Sq Epi: x / Bacteria: x            PAST MEDICAL & SURGICAL HISTORY:  Breast CA      Diabetes      Stroke      Cardiac arrest      HTN (hypertension)      H/O mastectomy, bilateral          FAMILY HISTORY:  No pertinent family history in first degree relatives        Social History:      RADIOLOGY:  [ ] Reviewed and interpreted by me    PULMONARY FUNCTION TESTS:    EKG: CHIEF COMPLAINT: Patient is a 73y old  Female who presents with a chief complaint of Respiratory distress (24 Sep 2023 18:08)    Interval Events: No major events reported overnight. Clinically unchanged. +L eye redness. and patient also noted with poor mental status. Nephro following, recs appreciated    REVIEW OF SYSTEMS:  See above   [x] Unable to assess ROS because poor mental status    Mode: AC/ CMV (Assist Control/ Continuous Mandatory Ventilation), RR (machine): 15, TV (machine): 350, FiO2: 35, PEEP: 8, ITime: 0.62, MAP: 15, PIP: 46    OBJECTIVE:  ICU Vital Signs Last 24 Hrs  T(C): 37.1 (25 Sep 2023 09:24), Max: 37.1 (25 Sep 2023 09:24)  T(F): 98.8 (25 Sep 2023 09:24), Max: 98.8 (25 Sep 2023 09:24)  HR: 101 (25 Sep 2023 09:24) (97 - 109)  BP: 95/32 (25 Sep 2023 09:24) (92/36 - 109/25)  BP(mean): --  ABP: --  ABP(mean): --  RR: 16 (25 Sep 2023 09:24) (16 - 18)  SpO2: 97% (25 Sep 2023 09:24) (95% - 99%)    O2 Parameters below as of 25 Sep 2023 09:24  Patient On (Oxygen Delivery Method): ventilator    O2 Concentration (%): 35    Mode: AC/ CMV (Assist Control/ Continuous Mandatory Ventilation), RR (machine): 15, TV (machine): 350, FiO2: 35, PEEP: 8, ITime: 0.62, MAP: 15, PIP: 46    09-24 @ 07:01  -  09-25 @ 07:00  --------------------------------------------------------  IN: 1192 mL / OUT: 700 mL / NET: 492 mL    CAPILLARY BLOOD GLUCOSE    POCT Blood Glucose.: 187 mg/dL (25 Sep 2023 05:14)    HOSPITAL MEDICATIONS:  MEDICATIONS  (STANDING):  acetic acid 0.25% Topical Irrigation 1 Application(s) Topical two times a day  albuterol/ipratropium for Nebulization 3 milliLiter(s) Nebulizer every 6 hours  atorvastatin 40 milliGRAM(s) Oral at bedtime  buMETAnide Injectable 2 milliGRAM(s) IV Push two times a day  chlorhexidine 0.12% Liquid 15 milliLiter(s) Oral Mucosa every 12 hours  chlorhexidine 2% Cloths 1 Application(s) Topical daily  dextrose 5%. 1000 milliLiter(s) (50 mL/Hr) IV Continuous <Continuous>  dextrose 5%. 1000 milliLiter(s) (100 mL/Hr) IV Continuous <Continuous>  dextrose 50% Injectable 12.5 Gram(s) IV Push once  dextrose 50% Injectable 25 Gram(s) IV Push once  dextrose 50% Injectable 25 Gram(s) IV Push once  epoetin odalis (EPOGEN) Injectable 52733 Unit(s) SubCutaneous <User Schedule>  ferrous    sulfate Liquid 300 milliGRAM(s) Enteral Tube daily  fluconAZOLE IVPB 200 milliGRAM(s) IV Intermittent every 24 hours  glucagon  Injectable 1 milliGRAM(s) IntraMuscular once  heparin  Infusion 1050 Unit(s)/Hr (10.5 mL/Hr) IV Continuous <Continuous>  insulin lispro (ADMELOG) corrective regimen sliding scale   SubCutaneous every 6 hours  insulin NPH human recombinant 3 Unit(s) SubCutaneous every 6 hours  midodrine. 10 milliGRAM(s) Oral three times a day  mupirocin 2% Ointment 1 Application(s) Topical two times a day  pantoprazole  Injectable 40 milliGRAM(s) IV Push two times a day  petrolatum Ophthalmic Ointment 1 Application(s) Right EYE two times a day  psyllium Powder 1 Packet(s) Oral daily  sodium chloride 2 Gram(s) Oral two times a day    MEDICATIONS  (PRN):  acetaminophen   Oral Liquid .. 650 milliGRAM(s) Oral every 6 hours PRN Temp greater or equal to 38C (100.4F), Mild Pain (1 - 3)  artificial tears (preservative free) Ophthalmic Solution 1 Drop(s) Both EYES three times a day PRN Dry Eyes  calamine/zinc oxide Lotion 1 Application(s) Topical two times a day PRN Rash and/or Itching  dextrose Oral Gel 15 Gram(s) Oral once PRN Blood Glucose LESS THAN 70 milliGRAM(s)/deciliter    PHYSICAL EXAM  GENERAL: Laying in bed comfortably, NAD  HEENT: +Trach, area c/d/i, +L ear purulent drainage noted  PULM: +Normal resp effort, + Rhonchi b/l, no w appreciated   HEART: +s1, s2  GI: +BS, soft, nt/nd, no peritoneal signs noted  MSK: No asymmetry, LIZZIE in upper extremities. RLE weakness- baseline. 2+ b/l pitting edema noted in lower ext   Skin: +rash  NEURO: Does not follow commands    LABS:                        7.6    5.17  )-----------( 212      ( 25 Sep 2023 05:15 )             24.3     09-25    129<L>  |  90<L>  |  108<H>  ----------------------------<  178<H>  4.5   |  25  |  2.77<H>    Ca    9.3      25 Sep 2023 05:15  Phos  4.0     09-25  Mg     2.50     09-25    TPro  5.4<L>  /  Alb  1.6<L>  /  TBili  <0.2  /  DBili  x   /  AST  31  /  ALT  8   /  AlkPhos  326<H>  09-24    Urinalysis Basic - ( 25 Sep 2023 05:15 )    Color: x / Appearance: x / SG: x / pH: x  Gluc: 178 mg/dL / Ketone: x  / Bili: x / Urobili: x   Blood: x / Protein: x / Nitrite: x   Leuk Esterase: x / RBC: x / WBC x   Sq Epi: x / Non Sq Epi: x / Bacteria: x    PAST MEDICAL & SURGICAL HISTORY:  Breast CA    Diabetes    Stroke    Cardiac arrest    HTN (hypertension)    H/O mastectomy, bilateral    FAMILY HISTORY:  No pertinent family history in first degree relatives    Social History:    RADIOLOGY:  [ ] Reviewed and interpreted by me    PULMONARY FUNCTION TESTS:    EKG:

## 2023-09-25 NOTE — PROGRESS NOTE ADULT - SUBJECTIVE AND OBJECTIVE BOX
Patient is a 73y old  Female who presents with a chief complaint of Respiratory distress (25 Sep 2023 14:48)      SUBJECTIVE / OVERNIGHT EVENTS: left eye redness    MEDICATIONS  (STANDING):  acetic acid 0.25% Topical Irrigation 1 Application(s) Topical two times a day  albuterol/ipratropium for Nebulization 3 milliLiter(s) Nebulizer every 6 hours  atorvastatin 40 milliGRAM(s) Oral at bedtime  buMETAnide Infusion 0.5 mG/Hr (2.5 mL/Hr) IV Continuous <Continuous>  chlorhexidine 0.12% Liquid 15 milliLiter(s) Oral Mucosa every 12 hours  chlorhexidine 2% Cloths 1 Application(s) Topical daily  dextrose 5%. 1000 milliLiter(s) (50 mL/Hr) IV Continuous <Continuous>  dextrose 5%. 1000 milliLiter(s) (100 mL/Hr) IV Continuous <Continuous>  dextrose 50% Injectable 12.5 Gram(s) IV Push once  dextrose 50% Injectable 25 Gram(s) IV Push once  dextrose 50% Injectable 25 Gram(s) IV Push once  epoetin odalis (EPOGEN) Injectable 14357 Unit(s) SubCutaneous <User Schedule>  ferrous    sulfate Liquid 300 milliGRAM(s) Enteral Tube daily  fluconAZOLE IVPB 200 milliGRAM(s) IV Intermittent every 24 hours  glucagon  Injectable 1 milliGRAM(s) IntraMuscular once  heparin  Infusion 1050 Unit(s)/Hr (10.5 mL/Hr) IV Continuous <Continuous>  insulin lispro (ADMELOG) corrective regimen sliding scale   SubCutaneous every 6 hours  insulin NPH human recombinant 3 Unit(s) SubCutaneous every 6 hours  midodrine. 10 milliGRAM(s) Oral three times a day  mupirocin 2% Ointment 1 Application(s) Topical two times a day  pantoprazole  Injectable 40 milliGRAM(s) IV Push two times a day  petrolatum Ophthalmic Ointment 1 Application(s) Right EYE two times a day  psyllium Powder 1 Packet(s) Oral daily  sodium chloride 2 Gram(s) Oral two times a day    MEDICATIONS  (PRN):  acetaminophen   Oral Liquid .. 650 milliGRAM(s) Oral every 6 hours PRN Temp greater or equal to 38C (100.4F), Mild Pain (1 - 3)  artificial tears (preservative free) Ophthalmic Solution 1 Drop(s) Both EYES three times a day PRN Dry Eyes  calamine/zinc oxide Lotion 1 Application(s) Topical two times a day PRN Rash and/or Itching  dextrose Oral Gel 15 Gram(s) Oral once PRN Blood Glucose LESS THAN 70 milliGRAM(s)/deciliter      Vital Signs Last 24 Hrs  T(F): 98.9 (09-25-23 @ 20:18), Max: 99.4 (09-25-23 @ 15:53)  HR: 98 (09-25-23 @ 20:18) (96 - 105)  BP: 99/48 (09-25-23 @ 20:18) (87/34 - 108/51)  RR: 15 (09-25-23 @ 20:18) (15 - 16)  SpO2: 97% (09-25-23 @ 20:18) (94% - 98%)  Telemetry:   CAPILLARY BLOOD GLUCOSE      POCT Blood Glucose.: 222 mg/dL (25 Sep 2023 23:08)  POCT Blood Glucose.: 210 mg/dL (25 Sep 2023 17:04)  POCT Blood Glucose.: 168 mg/dL (25 Sep 2023 10:58)  POCT Blood Glucose.: 187 mg/dL (25 Sep 2023 05:14)    I&O's Summary    24 Sep 2023 07:01  -  25 Sep 2023 07:00  --------------------------------------------------------  IN: 1192 mL / OUT: 700 mL / NET: 492 mL    25 Sep 2023 07:01  -  25 Sep 2023 23:42  --------------------------------------------------------  IN: 420 mL / OUT: 0 mL / NET: 420 mL        PHYSICAL EXAM:  GENERAL: NAD, well-developed  HEAD:  Atraumatic, Normocephalic  EYES: EOMI, PERRLA, conjunctiva and sclera clear  NECK: Supple, No JVD  CHEST/LUNG: Clear to auscultation bilaterally; No wheeze  HEART: Regular rate and rhythm; No murmurs, rubs, or gallops  ABDOMEN: Soft, Nontender, Nondistended; Bowel sounds present  EXTREMITIES:  2+ Peripheral Pulses, No clubbing, cyanosis, or edema  PSYCH: AAOx3  NEUROLOGY: non-focal  SKIN: No rashes or lesions    LABS:                        7.6    5.17  )-----------( 212      ( 25 Sep 2023 05:15 )             24.3     09-25    128<L>  |  93<L>  |  102<H>  ----------------------------<  200<H>  4.4   |  26  |  2.88<H>    Ca    9.3      25 Sep 2023 21:30  Phos  3.9     09-25  Mg     2.40     09-25    TPro  5.4<L>  /  Alb  1.6<L>  /  TBili  <0.2  /  DBili  x   /  AST  31  /  ALT  8   /  AlkPhos  326<H>  09-24    PTT - ( 25 Sep 2023 13:38 )  PTT:82.1 sec      Urinalysis Basic - ( 25 Sep 2023 21:30 )    Color: x / Appearance: x / SG: x / pH: x  Gluc: 200 mg/dL / Ketone: x  / Bili: x / Urobili: x   Blood: x / Protein: x / Nitrite: x   Leuk Esterase: x / RBC: x / WBC x   Sq Epi: x / Non Sq Epi: x / Bacteria: x        RADIOLOGY & ADDITIONAL TESTS:    Imaging Personally Reviewed:    Consultant(s) Notes Reviewed:      Care Discussed with Consultants/Other Providers:

## 2023-09-25 NOTE — PROGRESS NOTE ADULT - ASSESSMENT
74 y/o F well known to me from my housecal outptn practice. she was admitted at [facility] 7/12-7/22 w aspiration PNA, was treated w CEFEPIME, developed an allergic rash,  dCHF, + MAC on AFB culture, had been progressively getting more and more lethargic and dyspneic at home since DC.   In  am of 8/11/23  ptn presented with respiratory distress w hypoxia and hypercarbia requiring intubation 2/2 volume overload +/- Asp PNA      Neuro   responds appropriately   Baseline MS AOx3, aphasic   - h/o CVA , on aspirin and statin . resumed w feeding tube, ASA resumed 8/26  - eeg  2/2 tremors, no sz focus  - less responsive in the past few days  - MRI 8/17:  new R cerebellar infarct, old left PCA/Occipital infarct. 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 Mcnulty, now in RCU  s/p septic shock overnight 9/1, now on meropenem, HDS  s/p trache, on vent support, copious secretions      Renal   Hyperkalemia with EKG changes  , initially treated w LOKELMA,  now resolved   Ptn well know to Dr. Colon, consult reviewed    HD 8/19,  8/21, 8/26 and 8/28.  s/p PUF 8/29 2.5 Liters, HD 8/30,  9/1, 9/4  started chronic HD 9/7,   cardura resumed  on diuretics  hyponatremic  ptn is less responsive, its possible she is uremic, unable to complete HD 9/19 2/2 bleeding at [facility],   [facility] shiley was removed 9/20, if need HD will need another shiley placed as per renal. On IV Fluconazole for fungal cx+ from O. externa DC        GI  NPO  on tube feeds  coffee ground emesis x 1 8/24, melena overnight 8/31, s/p 1UPRBC, EGD/Colonoscopy: erosive gastropathy, esophagisits, on colonoscopy old blood seen no active bleeding, poor prep  family to decide re PEG placement    Endocrine  T2DM   A1C 7.1 in 7/2023   - BG goal 140-200     ID   No fever/leukocytosis, recheck temp   Hx latent TB which was treated, no concern for TB   - grew MAC on AFB culture from most recent admission. at [facility]  -MSSA Bacteremia 9/1, allergic to cephalosprins and PCN, on Vanco as per ID, renal dosing,   - sputum also grew E.Coli-ESBL, as per ID recs for  Bradford through 9/7( 1 week course as per ID) , afebrile.  - 9/8:  spike  temp 38.1C, has O. externa, has a wick, recs are to get a CT to R/O an abscess/bony involvement. cont Meropenem  9/9: ptn w fever overnight to 100.8,  may need shiley pulled if bacteremic, needs NECK CT as per ENT to R/O Mastoid bone involvement while having ongoing purulent DC from L ear 2/2 O. externa, getting daily wick changes  9/10:  spiked 102 overnight through Bradford and Vanco, purulent DC from O. externa, ENT recommend Ct of mastoid. ptn in HD, has Shiley in place, consider pulling shiley and send for Cx. would d/w Renal, and consider contacting  ID, last seen by Dr. Conner 9/6 9/11: remains febrile, Dr. Conner reconsulted. cont Bradford, Vanco  9/12: tmax 100.5, fever curve is improving, awaiting Left ear CT, on Bradford since 9/1. on  vanco for MSSA Bacteremia, does not tolerate cephalosporins. through 10/2  9/13: on full vent support via trache, appears uncomfortable and onur 2/2 Left O. externa. has an incidental left middle ear tumor, no acute middle ear interventions recommended, left ear wick replaced. on Meropenem, cx from Ear DC is neg. On Vanco for MSSA bacteremia through 10/2, course of Meropenem as per ID, afebrile in the past 24 hrs . Cardura for HTN resumed, HGB dropped to 6.4, got a dose of EPO and 1UPRBC, rpt 7.8, remains on HEPARIN drip for LEFT popliteal DVT  9/14: cont on Mupirocin locally to Left ear, cont IV Bradford, ENT signed off, afebrile x 48 hrs, Mro course to be determined by ID, on Vanco for MSSA bacteremia through 10/2. ongoing worsening hyponatremia, Hypertonic saline as per renal, no HD today, s/p 1UPRBC 9/13  9/15: spiking temps again, if cont to spike as per ID re PAN scan. cont Vanco for MSSA Bacteremia  9/16-18: no temp overnight  afebrile, cont to have Left ear DC,   on Vanco and ,bradford through 10/2  On IV Fluconazole for fungal cx+ from O. externa Discharge.   fluconazole started 9/21, ptn developed an allergic rash on 9/22. doubt its 2/2 to MEROPENEM.  MEropenem was DCed 2/2 causing possible drug rash.  on VANCO based on levels for MSSA bacteremia        Heme/Onc  ppx: place on SCD  Transfuse for hgb 6.4, no obv bleed. HDS  LEFT POPLITEAL VEIN ACUTE DVT on 9/10    Ethics  GOC - Discussed GOC with daughter and , they have opted in the past for full code. and she remains full code at present

## 2023-09-26 NOTE — PROGRESS NOTE ADULT - NS ATTEND AMEND GEN_ALL_CORE FT
Pt is a 73F with PMHx IDDMII, CKD Stage IV, CHFpEF, latent TB (s/p tx,) hx breast cancer (2018, s/p mastectomy and adjuvant CT/RT), and hx CVA s/p trach/PEG (now decannulated) initially presenting to [facility] on 8/11/23 with acute hypoxemic and hypercapnic respiratory failure s/p ETT intubation/MV and ARF with pulmonary edema c/b HTN emergency requiring nicardipine drip transferred to MICU for further management with failure to wean from ventilator s/p tracheostomy placement and RCU transfer for further management.     Pt initially p/w worsening RUE shaking and dysconjugate gaze with MRI 8/17 (+) new right cerebellar, midbrain, and multiple tiny embolic infarcts as well as known left PCA CVA. EEG (-). 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 ILR in place from prior CVA evaluation, last interrogated 9/7 without AF. At baseline, pt reportedly wheelchair bound with RUE tremors and some ADL assistance. Pt was unable to tolerate ASA 2/2 GIB, will re-attempt once more stable. Worsening mentation x2 weeks possibly 2/2 metabolic/infectious encephalopathy in the setting of hyponatremia, uremia, and recurrent infection. No new focal deficits noted. Pt intermittently awakens but only for very short periods of time. Repeat CT Head performed this morning with concern for possible new left parietal infarct vs. artifact. Will discuss with neurology.     Pt with acute hypoxemic and hypercapnic respiratory failure s/p ETT intubation/MV possibly 2/2 flash pulmonary edema in the setting of HTN emergency +/- aspiration event. Pt with failure of ventilatory weaning now s/p tracheostomy placement (IP 9/1 Jagrutiley #6 cuffed) Of note, pt also noted to have evidence of midbrain CVA with possible concern for central effect on respiratory drive, will monitor carefully and continue weaning trials as tolerated. Pt able to spontaneously breath intermittently, will continue to attempt further weaning trials. Airway clearance therapy in place. Wean O2 supplementation for goal O2 saturation 90-95%. Oral hygiene and aspiration precautions in place. Trach care and suctioning as per RCU team.      Pt p/w ARF on CKD requiring initiation of HD 2/2 pulmonary edema and metabolic acidosis with metabolic encephalopathy in MICU. Now off HD since 9/20. Pt with (+) UOP on diuretics. No urgent indication for HD today. SHAKIRA Rosado removed as no longer functioning. Finished prednisone taper for possible AIN. Strict I/Os and daily BMPs in place. Hospital course further c/b acute hypervolemic hyponatremia and uremia in the setting of renal failure not resolving despite hypertonic saline, diuretics, and salt tabs. Attempted increase in bumetanide to continuous drip x24 hrs 2/2 worsening fluid overload and uremia but pt was unable to tolerate. If pt shows s/s requiring HD will need replacement catheter.     Hospital course further c/b recurrent infections, most recently with MSSA bacteremia (9/1, cleared 9/4 and 9/8) with (+) MSSA and ESBL EColi in BAL as well. Pt with possible cefepime vs. cefazolin drug-induced rash followed by transition to vancomycin. Was then found to have left otitis externa (9/5) s/p serial bedside debridement with worsening necrotic tissue/erythema for which she was also initiated on meropenem. Family refusing contrast, non-contrast study obtained with evidence of bone involvement but no indication for further surgical intervention. Pt underwent ENT debridement x2 (last 9/21). Found to have new black spores and white discoloration with concern for superimposed candida infection. Given fevers and need for debridement fluconazole added to regimen 9/21. Meropenem completed, CT imaging does not show significant enough bone involvement to require prolonged course for osteomyelitis. Plan to also complete vancomycin by level x4 week course through 10/2 (given inability to exclude endocarditis). Discussed with ID and ENT, recs appreciated. Obtain pan-CT scan if remains febrile. Pt with new left eye redness, opthalmology consulted especially in the setting of concern for surrounding infectious process now with concern for uveitis vs. endophthalmitis.    Pt also with oropharyngeal dysphagia requiring NGTs followed by UGIB s/p EGD (8/31) found to have esophagitis/erosive gastropathy now on PPI BID. Pt further found to have popliteal DVT. No c/i by GI for A/C, pt initiated on heparin drip with patient-specific pTTs. Will need to ultimately transition to Coumadin x3-6 months after PEG placement.     Dispo pending medical optimization. Pt full code. DVT ppx full A/C with heparin drip. Will continue to discuss plan of care with pt's family (, HCP) and daughter daily.

## 2023-09-26 NOTE — CONSULT NOTE ADULT - ASSESSMENT
Assessment and Recommendations:  73y female with a past medical history/ocular history of *** consulted for ***, found to have ***    Seen and discussed with ***.    Outpatient Follow-up: Patient should follow-up with his/her ophthalmologist or with [facility] Department of Ophthalmology within 1 week of after discharge at:    [address]  [place] [address]  [phone]    Latricia France MD, PGY-3  Contact: [email or web address]     Assessment and Recommendations:  73y female with a past medical history/ocular history of DM, HTN, CKD, breast CA, respiratory arrest and cardiac arrest (2018), CVA with residual weakness, aspiration PNA h/o trache, PEG, Diabetic retinopathy, consulted for red left eye, found to have left sided injection, chemosis, and descemet membrane folds concerning for early infection vs. HSV.     #Injection OS, concerning for possible early infection   - Unable to test patient's visual acuity, color vision, EOM, due to current status  - Anterior exam with injection, chemosis, and diffuse d-folds OS  - DDx includes early endophthalmitis vs. HSV  - Please start Ofloxacin 1 drop left eye 6 times a day (ordered by ophtho)  - Ophtho will follow    Seen and discussed with Dr. Alfaro, Attending.     Outpatient Follow-up: Patient should follow-up with his/her ophthalmologist or with [facility] Department of Ophthalmology within 1 week of after discharge at:    [address]  [place] [address]  [phone]    Latricia France MD, PGY-3  Contact: [employer]

## 2023-09-26 NOTE — CHART NOTE - NSCHARTNOTEFT_GEN_A_CORE
: Anders    INDICATION: Shock, Respiratory failure    PROCEDURE:  [X] LIMITED ECHO  [X] LIMITED CHEST  [ ] LIMITED RETROPERITONEAL  [ ] LIMITED ABDOMINAL  [ ] LIMITED DVT  [ ] NEEDLE GUIDANCE VASCULAR  [ ] NEEDLE GUIDANCE THORACENTESIS  [ ] NEEDLE GUIDANCE PARACENTESIS  [ ] NEEDLE GUIDANCE PERICARDIOCENTESIS  [ ] OTHER    FINDINGS: A line predominant pattern anteriorly. Bilateral simple pleural effusions R > L. Normal LV systolic function. RV smaller than LV in size. IVC 1.5 cm      INTERPRETATION: Normal lung aeration pattern.  Bilateral simple pleural effusions R > L. Normal LV systolic function. Possibly fluid responsive.     Images stored on [employer].    Mitchell Mcnamara MD  Pulmonary & Critical Care

## 2023-09-26 NOTE — PROGRESS NOTE ADULT - ASSESSMENT
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 family declined renal biopsy started on prednisone  s/p trach 9/1 and BAL with MSSA   ET cx with ESBL and MSSA  started on ana cristina 9/1  blood cx 9/1: MSSA   repeat negative 9/4, 9/8  CXR with b/l opacities, R increased  L ear edema and otitis externa    initial  resp failure for ?fluid ovrer load but also had MSSA in the sputum, got vanco, aztreonam one day then cefepime and had rash, renal failure which was considered due to cefepime and then received 2 days of cefazolin and then off, now with trach and bronc on 9/1 with MSSA bacteremia and BAL also MSSA  another ET cx with MSSA and ESBL E-coli  hypotension, MSSA bacteremia likely due to pneumonia, repeat blood cx negative 9/4, TTE limited unable to exclude endocarditis  L otitis externa on ana cristina since 9/1 but worsening edema and black discoloration with bullae forming and persistent fever (ear cx was negative)  Ct showed acute on chronic otitis externa and otomastoiditis , superimposed cholesteatoma can not be excluded, no malignant otitis externa  pt again febrile, ENT stated there was black spores which could be a fungal infection and the last cx showed candida albicans  bleeding from HD site and ilene removed   new erythematous patchy rash, did not improved after discontinuing the ana cristina  also hypotensive now and ?uveitis vs endophthalmitis, head CT with new acute/subacute L parietal infarct    * pt was transferred to MICU for hypotension  * would change vanco to dapto 500 q 48 (if plan is for HD, should be given after HD) as the pt still with extensive rash and hypotensive  * check CK  * chest/abd CT  * c/w fluconazole 200 qd, started 9/21  * will need a 4 week course  for the MSSA bacteremia from the negative blood cx through 10/2  * monitor CBC/diff and renal function    The above assessment and plan was discussed with the primary team    Yumiko Conner MD  contact on teams  After 5pm and on weekends call [phone]

## 2023-09-26 NOTE — PROGRESS NOTE ADULT - PROBLEM SELECTOR PLAN 1
Multifactorial     - Aspiration, acute on chronic diastolic CHF   On fluconazole  s/p trach 9/1    - c/w meds as ordered

## 2023-09-26 NOTE — PROGRESS NOTE ADULT - ASSESSMENT
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 ?uremia of late; we attempted HD last week from the shiley that has been in place for the past few weeks; the catheter was not able to be used successfully. Shiley is out now, but placement of new HD catheter would be a potential nidus for further infection    (3)Hyponatremia - low but slowly improving/stable, on NaCl tabs    (4)Pulm- hypercapnic respiratory failure on admission - now s/p trach; remains on vent     (5)ID - s/p IV Meropenem now on fluconazole     (6)Anemia - receiving DAVID and PRBCs intermittently    (7)AMS - at least at times over the past 2-3 days, she has been mentating well; this argues against uremic encephalopathy    RECOMMEND:  (1)No HD for now, attempting efforts to further forgo HD; if we are to re-perform HD, would plan for new shiley placement  (2)NaCl tabs as ordered  (3)Bumex gtt per RCU  (4)Monitor I/Os    Nilda Espinoza DNP   [facility]  [phone]      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 ?uremia of late; we attempted HD last week from the shiley that has been in place for the past few weeks; the catheter was not able to be used successfully. Shiley is out now, but placement of new HD catheter would be a potential nidus for further infection    (3)Hyponatremia - low but slowly improving/stable, on NaCl tabs    (4)Pulm- hypercapnic respiratory failure on admission - now s/p trach; remains on vent     (5)ID - s/p IV Meropenem now on fluconazole     (6)Anemia - receiving DAVID and PRBCs intermittently    (7)AMS - at least at times over the past 2-3 days, she has been mentating well; this argues against uremic encephalopathy    RECOMMEND:  (1)No HD for now, attempting efforts to further forgo HD; if we are to re-perform HD, would plan for new shiley placement  (2)NaCl tabs as ordered  (3)Bumex gtt per RCU  (4)Monitor I/Os    Nilda Espinoza DNP   [facility]  [phone]       RENAL ATTENDING NOTE  Patient seen and examined with NP. Family at bedside   Hypotensive with SBP 80s; FiO2 increased to 100% at present; in process of transfer to MICU  Counseled family- advised that whereas there is no need for HD at present, there is risk of new RUSS/associated need for HD if BP remains low for an extended period of time.    Jimmy Colon MD  [facility]  [phone]

## 2023-09-26 NOTE — PROGRESS NOTE ADULT - SUBJECTIVE AND OBJECTIVE BOX
Patient is a 73y old  Female who presents with a chief complaint of Respiratory distress (26 Sep 2023 15:00)      SUBJECTIVE / OVERNIGHT EVENTS: ptn on Levo drip 2/2 hypotension in MICU    MEDICATIONS  (STANDING):  acetic acid 0.25% Topical Irrigation 1 Application(s) Topical two times a day  albumin human  5% IVPB 250 milliLiter(s) IV Intermittent every 6 hours  albuterol/ipratropium for Nebulization 3 milliLiter(s) Nebulizer every 6 hours  atorvastatin 40 milliGRAM(s) Oral at bedtime  buMETAnide Injectable 2 milliGRAM(s) IV Push <User Schedule>  chlorhexidine 0.12% Liquid 15 milliLiter(s) Oral Mucosa every 12 hours  chlorhexidine 2% Cloths 1 Application(s) Topical daily  DAPTOmycin IVPB 500 milliGRAM(s) IV Intermittent every 48 hours  dextrose 5%. 1000 milliLiter(s) (100 mL/Hr) IV Continuous <Continuous>  dextrose 5%. 1000 milliLiter(s) (50 mL/Hr) IV Continuous <Continuous>  dextrose 50% Injectable 25 Gram(s) IV Push once  dextrose 50% Injectable 25 Gram(s) IV Push once  dextrose 50% Injectable 12.5 Gram(s) IV Push once  epoetin odalis (EPOGEN) Injectable 69315 Unit(s) SubCutaneous <User Schedule>  ferrous    sulfate Liquid 300 milliGRAM(s) Enteral Tube daily  fluconAZOLE IVPB 200 milliGRAM(s) IV Intermittent every 24 hours  glucagon  Injectable 1 milliGRAM(s) IntraMuscular once  heparin  Infusion 1050 Unit(s)/Hr (10.5 mL/Hr) IV Continuous <Continuous>  insulin lispro (ADMELOG) corrective regimen sliding scale   SubCutaneous every 6 hours  insulin NPH human recombinant 3 Unit(s) SubCutaneous every 6 hours  midodrine. 20 milliGRAM(s) Oral three times a day  mupirocin 2% Ointment 1 Application(s) Topical two times a day  norepinephrine Infusion 0.02 MICROgram(s)/kG/Min (2.49 mL/Hr) IV Continuous <Continuous>  pantoprazole  Injectable 40 milliGRAM(s) IV Push two times a day  petrolatum Ophthalmic Ointment 1 Application(s) Right EYE two times a day  psyllium Powder 1 Packet(s) Oral daily  sodium chloride 2 Gram(s) Oral two times a day    MEDICATIONS  (PRN):  acetaminophen   Oral Liquid .. 650 milliGRAM(s) Oral every 6 hours PRN Temp greater or equal to 38C (100.4F), Mild Pain (1 - 3)  artificial tears (preservative free) Ophthalmic Solution 1 Drop(s) Both EYES three times a day PRN Dry Eyes  calamine/zinc oxide Lotion 1 Application(s) Topical two times a day PRN Rash and/or Itching  dextrose Oral Gel 15 Gram(s) Oral once PRN Blood Glucose LESS THAN 70 milliGRAM(s)/deciliter      Vital Signs Last 24 Hrs  T(F): 98.9 (09-26-23 @ 14:20), Max: 99.7 (09-26-23 @ 12:00)  HR: 96 (09-26-23 @ 16:00) (78 - 101)  BP: 90/68 (09-26-23 @ 16:00) (68/45 - 114/79)  RR: 24 (09-26-23 @ 16:00) (15 - 24)  SpO2: 97% (09-26-23 @ 16:00) (92% - 100%)  Telemetry:   CAPILLARY BLOOD GLUCOSE      POCT Blood Glucose.: 201 mg/dL (26 Sep 2023 10:57)  POCT Blood Glucose.: 226 mg/dL (26 Sep 2023 05:22)  POCT Blood Glucose.: 222 mg/dL (25 Sep 2023 23:08)  POCT Blood Glucose.: 210 mg/dL (25 Sep 2023 17:04)    I&O's Summary    25 Sep 2023 07:01  -  26 Sep 2023 07:00  --------------------------------------------------------  IN: 993.5 mL / OUT: 0 mL / NET: 993.5 mL    26 Sep 2023 07:01  -  26 Sep 2023 16:48  --------------------------------------------------------  IN: 398.5 mL / OUT: 250 mL / NET: 148.5 mL        PHYSICAL EXAM:  GENERAL: NAD, well-developed  HEAD:  Atraumatic, Normocephalic  EYES: EOMI, PERRLA, conjunctiva and sclera clear  NECK: Supple, No JVD  CHEST/LUNG: Clear to auscultation bilaterally; No wheeze  HEART: Regular rate and rhythm; No murmurs, rubs, or gallops  ABDOMEN: Soft, Nontender, Nondistended; Bowel sounds present  EXTREMITIES:  2+ Peripheral Pulses, No clubbing, cyanosis, or edema  PSYCH: AAOx3  NEUROLOGY: non-focal  SKIN: No rashes or lesions    LABS:                        7.9    8.99  )-----------( 301      ( 26 Sep 2023 16:05 )             25.3     09-26    128<L>  |  91<L>  |  109<H>  ----------------------------<  213<H>  4.5   |  25  |  2.87<H>    Ca    9.4      26 Sep 2023 06:15  Phos  4.0     09-26  Mg     2.40     09-26      PTT - ( 25 Sep 2023 13:38 )  PTT:82.1 sec      Urinalysis Basic - ( 26 Sep 2023 06:15 )    Color: x / Appearance: x / SG: x / pH: x  Gluc: 213 mg/dL / Ketone: x  / Bili: x / Urobili: x   Blood: x / Protein: x / Nitrite: x   Leuk Esterase: x / RBC: x / WBC x   Sq Epi: x / Non Sq Epi: x / Bacteria: x        RADIOLOGY & ADDITIONAL TESTS:    Imaging Personally Reviewed:    Consultant(s) Notes Reviewed:      Care Discussed with Consultants/Other Providers:

## 2023-09-26 NOTE — CHART NOTE - NSCHARTNOTEFT_GEN_A_CORE
ICU Accept Note  ---------------------------  Transfer from:   Accepted by: MICU  Accepting Physician:    HOSPITAL COURSE      OBJECTIVE --  Vital Signs Last 24 Hrs  T(C): 37.6 (26 Sep 2023 12:00), Max: 37.6 (26 Sep 2023 12:00)  T(F): 99.7 (26 Sep 2023 12:00), Max: 99.7 (26 Sep 2023 12:00)  HR: 96 (26 Sep 2023 12:00) (78 - 101)  BP: 85/53 (26 Sep 2023 12:00) (68/45 - 99/48)  BP(mean): 59 (26 Sep 2023 12:00) (41 - 59)  RR: 16 (26 Sep 2023 12:00) (15 - 16)  SpO2: 96% (26 Sep 2023 12:00) (94% - 98%)    Parameters below as of 26 Sep 2023 12:00  Patient On (Oxygen Delivery Method): ventilator        I&O's Summary    25 Sep 2023 07:01  -  26 Sep 2023 07:00  --------------------------------------------------------  IN: 993.5 mL / OUT: 0 mL / NET: 993.5 mL    26 Sep 2023 07:01  -  26 Sep 2023 14:16  --------------------------------------------------------  IN: 398.5 mL / OUT: 250 mL / NET: 148.5 mL        MEDICATIONS  (STANDING):  acetic acid 0.25% Topical Irrigation 1 Application(s) Topical two times a day  albuterol/ipratropium for Nebulization 3 milliLiter(s) Nebulizer every 6 hours  atorvastatin 40 milliGRAM(s) Oral at bedtime  chlorhexidine 0.12% Liquid 15 milliLiter(s) Oral Mucosa every 12 hours  chlorhexidine 2% Cloths 1 Application(s) Topical daily  dextrose 5%. 1000 milliLiter(s) (100 mL/Hr) IV Continuous <Continuous>  dextrose 5%. 1000 milliLiter(s) (50 mL/Hr) IV Continuous <Continuous>  dextrose 50% Injectable 12.5 Gram(s) IV Push once  dextrose 50% Injectable 25 Gram(s) IV Push once  dextrose 50% Injectable 25 Gram(s) IV Push once  epoetin odalis (EPOGEN) Injectable 90091 Unit(s) SubCutaneous <User Schedule>  ferrous    sulfate Liquid 300 milliGRAM(s) Enteral Tube daily  fluconAZOLE IVPB 200 milliGRAM(s) IV Intermittent every 24 hours  glucagon  Injectable 1 milliGRAM(s) IntraMuscular once  heparin  Infusion 1050 Unit(s)/Hr (10.5 mL/Hr) IV Continuous <Continuous>  insulin lispro (ADMELOG) corrective regimen sliding scale   SubCutaneous every 6 hours  insulin NPH human recombinant 3 Unit(s) SubCutaneous every 6 hours  midodrine. 20 milliGRAM(s) Oral three times a day  mupirocin 2% Ointment 1 Application(s) Topical two times a day  pantoprazole  Injectable 40 milliGRAM(s) IV Push two times a day  petrolatum Ophthalmic Ointment 1 Application(s) Right EYE two times a day  psyllium Powder 1 Packet(s) Oral daily  sodium chloride 2 Gram(s) Oral two times a day    MEDICATIONS  (PRN):  acetaminophen   Oral Liquid .. 650 milliGRAM(s) Oral every 6 hours PRN Temp greater or equal to 38C (100.4F), Mild Pain (1 - 3)  artificial tears (preservative free) Ophthalmic Solution 1 Drop(s) Both EYES three times a day PRN Dry Eyes  calamine/zinc oxide Lotion 1 Application(s) Topical two times a day PRN Rash and/or Itching  dextrose Oral Gel 15 Gram(s) Oral once PRN Blood Glucose LESS THAN 70 milliGRAM(s)/deciliter      Physical Exam:  General; Pt appears well and in no acute distress   HEENT:   Normal oral mucosa, PERRL, EOMI	  Lymphatic: No lymphadenopathy , no edema  Cardiovascular: Normal S1 S2, No JVD, No murmurs , Peripheral pulses palpable 2+ bilaterally  Respiratory: Lungs clear to auscultation, normal effort 	  Gastrointestinal:  Soft, Non-tender, + BS	  Skin: No rashes, No ecchymoses, No cyanosis, warm to touch  Musculoskeletal: Normal range of motion, normal strength  Neurology: A&O x 4, cranial nerves intact, motor and sensory strenghts are intact, no focal weakness, no drift, no dysarthria, no dysmetria,   Psychiatry:  Mood & affect appropriate    LABS                                            7.7                   Neurophils% (auto):   78.1   (09-26 @ 08:48):    7.19 )-----------(259          Lymphocytes% (auto):  9.6                                           24.2                   Eosinphils% (auto):   0.7      Manual%: Neutrophils x    ; Lymphocytes x    ; Eosinophils x    ; Bands%: x    ; Blasts x                                    128    |  91     |  109                 Calcium: 9.4   / iCa: x      (09-26 @ 06:15)    ----------------------------<  213       Magnesium: 2.40                             4.5     |  25     |  2.87             Phosphorous: 4.0            ASSESSMENT & PLAN:   73y-year-old Female with a past medical history of ______ , admitted for ___, now with _____, admitted to ICU for further assessment and management.     NEUROLOGY:  -   -    PULMONARY:  -  -    CARDIOLOGY:  -  -    GASTROINTESTINAL:  -  -    RENAL/:  -  -    INFECTIOUS DISEASE:  -   -    HEMATOLOGY:  -   -      ENDOCRINE  -    ETHICS/DISPOSITION:  -    - Transfer to ____ .     LINES/DRAINS/TUBES/DEVICES:  -  -    Above assesment and plan of care performed, created, and reviewed in real time with MICU attending physician  ____.     Kamila Hoang PA-C ICU Accept Note  ---------------------------  Transfer from:   Accepted by: MICU  Accepting Physician:    HOSPITAL COURSE      OBJECTIVE --  Vital Signs Last 24 Hrs  T(C): 37.6 (26 Sep 2023 12:00), Max: 37.6 (26 Sep 2023 12:00)  T(F): 99.7 (26 Sep 2023 12:00), Max: 99.7 (26 Sep 2023 12:00)  HR: 96 (26 Sep 2023 12:00) (78 - 101)  BP: 85/53 (26 Sep 2023 12:00) (68/45 - 99/48)  BP(mean): 59 (26 Sep 2023 12:00) (41 - 59)  RR: 16 (26 Sep 2023 12:00) (15 - 16)  SpO2: 96% (26 Sep 2023 12:00) (94% - 98%)    Parameters below as of 26 Sep 2023 12:00  Patient On (Oxygen Delivery Method): ventilator        I&O's Summary    25 Sep 2023 07:01  -  26 Sep 2023 07:00  --------------------------------------------------------  IN: 993.5 mL / OUT: 0 mL / NET: 993.5 mL    26 Sep 2023 07:01  -  26 Sep 2023 14:16  --------------------------------------------------------  IN: 398.5 mL / OUT: 250 mL / NET: 148.5 mL        MEDICATIONS  (STANDING):  acetic acid 0.25% Topical Irrigation 1 Application(s) Topical two times a day  albuterol/ipratropium for Nebulization 3 milliLiter(s) Nebulizer every 6 hours  atorvastatin 40 milliGRAM(s) Oral at bedtime  chlorhexidine 0.12% Liquid 15 milliLiter(s) Oral Mucosa every 12 hours  chlorhexidine 2% Cloths 1 Application(s) Topical daily  dextrose 5%. 1000 milliLiter(s) (100 mL/Hr) IV Continuous <Continuous>  dextrose 5%. 1000 milliLiter(s) (50 mL/Hr) IV Continuous <Continuous>  dextrose 50% Injectable 12.5 Gram(s) IV Push once  dextrose 50% Injectable 25 Gram(s) IV Push once  dextrose 50% Injectable 25 Gram(s) IV Push once  epoetin odalis (EPOGEN) Injectable 28007 Unit(s) SubCutaneous <User Schedule>  ferrous    sulfate Liquid 300 milliGRAM(s) Enteral Tube daily  fluconAZOLE IVPB 200 milliGRAM(s) IV Intermittent every 24 hours  glucagon  Injectable 1 milliGRAM(s) IntraMuscular once  heparin  Infusion 1050 Unit(s)/Hr (10.5 mL/Hr) IV Continuous <Continuous>  insulin lispro (ADMELOG) corrective regimen sliding scale   SubCutaneous every 6 hours  insulin NPH human recombinant 3 Unit(s) SubCutaneous every 6 hours  midodrine. 20 milliGRAM(s) Oral three times a day  mupirocin 2% Ointment 1 Application(s) Topical two times a day  pantoprazole  Injectable 40 milliGRAM(s) IV Push two times a day  petrolatum Ophthalmic Ointment 1 Application(s) Right EYE two times a day  psyllium Powder 1 Packet(s) Oral daily  sodium chloride 2 Gram(s) Oral two times a day    MEDICATIONS  (PRN):  acetaminophen   Oral Liquid .. 650 milliGRAM(s) Oral every 6 hours PRN Temp greater or equal to 38C (100.4F), Mild Pain (1 - 3)  artificial tears (preservative free) Ophthalmic Solution 1 Drop(s) Both EYES three times a day PRN Dry Eyes  calamine/zinc oxide Lotion 1 Application(s) Topical two times a day PRN Rash and/or Itching  dextrose Oral Gel 15 Gram(s) Oral once PRN Blood Glucose LESS THAN 70 milliGRAM(s)/deciliter      Physical Exam:  General; Pt appears well and in no acute distress   HEENT:   Normal oral mucosa, PERRL, EOMI	  Lymphatic: No lymphadenopathy , no edema  Cardiovascular: Normal S1 S2, No JVD, No murmurs , Peripheral pulses palpable 2+ bilaterally  Respiratory: Lungs clear to auscultation, normal effort 	  Gastrointestinal:  Soft, Non-tender, + BS	  Skin: No rashes, No ecchymoses, No cyanosis, warm to touch  Musculoskeletal: Normal range of motion, normal strength  Neurology: A&O x 4, cranial nerves intact, motor and sensory strenghts are intact, no focal weakness, no drift, no dysarthria, no dysmetria,   Psychiatry:  Mood & affect appropriate    LABS                                            7.7                   Neurophils% (auto):   78.1   (09-26 @ 08:48):    7.19 )-----------(259          Lymphocytes% (auto):  9.6                                           24.2                   Eosinphils% (auto):   0.7      Manual%: Neutrophils x    ; Lymphocytes x    ; Eosinophils x    ; Bands%: x    ; Blasts x                                    128    |  91     |  109                 Calcium: 9.4   / iCa: x      (09-26 @ 06:15)    ----------------------------<  213       Magnesium: 2.40                             4.5     |  25     |  2.87             Phosphorous: 4.0            ASSESSMENT & PLAN:   73y-year-old Female with a past medical history of ______ , admitted for ___, now with _____, admitted to ICU for further assessment and management.     NEUROLOGY:  -   -    PULMONARY:  -  -    CARDIOLOGY:  -  -    GASTROINTESTINAL:  -  -    RENAL/:  -  -    INFECTIOUS DISEASE:  -   -    HEMATOLOGY:  -   -      ENDOCRINE  -    ETHICS/DISPOSITION:  -    - Transfer to ____ .     LINES/DRAINS/TUBES/DEVICES:  -  -    Above assesment and plan of care performed, created, and reviewed in real time with MICU attending physician  ____.     Kamila Hoang PA-C      ###################ATTENDING ATTESTATION###############################  This patient is critically ill and required frequent bedside visits with therapy change. I have personally provided ____ minutes of critical care time concurrently with the resident/fellow. This time excludes time spent on separate procedures and time spent teaching. I have reviewed the resident/fellow’s documentation and I agree with the assessment and plan of care.      Patient is a 72 yo F (wheelchair bound prior to admission) w/ HFpEF, T2DM, CKD5, latent TB (s/p tx,) hx breast ca (2018, s/p mastectomy and adjuvant CT/RT), and hx prior CVA s/p trach/PEG (decannulated prior to admission, ILR in place) who was initially admitted on 8/11/23 after p/w with acute hypoxemic and hypercapnic respiratory failure thought to be 2/2 Aspiration PNA vs ADHF/flash pulmonary edema in setting of HTN emergency. Patient required intubation and mechanical ventilationtubation, and was admitted to MICU for lengthy period of time.     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     Hospital course also marked by recurrent infections, most recently with MSSA bacteremia (now on Vanc through 10/2 due to possible cefepime vs. cefazolin drug-induced rash) and MSSA/ESBL EColi in BAL. Also found to have L otitis externa (9/5) s/p ENT debridement x2 (last 9/21) c/b concern for superimposed candida infection (s/p Meropenem, now on Fluconazole since 9/21). Also further found to have L popliteal DVT (initiated on heparin gtt).    Patient transferred back to MICU for worsening hypotension as well as rising creatinine/BUN in setting of ARF on CKD.    Mitchell Mcnamara MD  Pulmonary & Critical Care ICU Accept Note  ---------------------------  Transfer from:   Accepted by: MICU  Accepting Physician:    HOSPITAL COURSE      OBJECTIVE --  Vital Signs Last 24 Hrs  T(C): 37.6 (26 Sep 2023 12:00), Max: 37.6 (26 Sep 2023 12:00)  T(F): 99.7 (26 Sep 2023 12:00), Max: 99.7 (26 Sep 2023 12:00)  HR: 96 (26 Sep 2023 12:00) (78 - 101)  BP: 85/53 (26 Sep 2023 12:00) (68/45 - 99/48)  BP(mean): 59 (26 Sep 2023 12:00) (41 - 59)  RR: 16 (26 Sep 2023 12:00) (15 - 16)  SpO2: 96% (26 Sep 2023 12:00) (94% - 98%)    Parameters below as of 26 Sep 2023 12:00  Patient On (Oxygen Delivery Method): ventilator        I&O's Summary    25 Sep 2023 07:01  -  26 Sep 2023 07:00  --------------------------------------------------------  IN: 993.5 mL / OUT: 0 mL / NET: 993.5 mL    26 Sep 2023 07:01  -  26 Sep 2023 14:16  --------------------------------------------------------  IN: 398.5 mL / OUT: 250 mL / NET: 148.5 mL        MEDICATIONS  (STANDING):  acetic acid 0.25% Topical Irrigation 1 Application(s) Topical two times a day  albuterol/ipratropium for Nebulization 3 milliLiter(s) Nebulizer every 6 hours  atorvastatin 40 milliGRAM(s) Oral at bedtime  chlorhexidine 0.12% Liquid 15 milliLiter(s) Oral Mucosa every 12 hours  chlorhexidine 2% Cloths 1 Application(s) Topical daily  dextrose 5%. 1000 milliLiter(s) (100 mL/Hr) IV Continuous <Continuous>  dextrose 5%. 1000 milliLiter(s) (50 mL/Hr) IV Continuous <Continuous>  dextrose 50% Injectable 12.5 Gram(s) IV Push once  dextrose 50% Injectable 25 Gram(s) IV Push once  dextrose 50% Injectable 25 Gram(s) IV Push once  epoetin odalis (EPOGEN) Injectable 19679 Unit(s) SubCutaneous <User Schedule>  ferrous    sulfate Liquid 300 milliGRAM(s) Enteral Tube daily  fluconAZOLE IVPB 200 milliGRAM(s) IV Intermittent every 24 hours  glucagon  Injectable 1 milliGRAM(s) IntraMuscular once  heparin  Infusion 1050 Unit(s)/Hr (10.5 mL/Hr) IV Continuous <Continuous>  insulin lispro (ADMELOG) corrective regimen sliding scale   SubCutaneous every 6 hours  insulin NPH human recombinant 3 Unit(s) SubCutaneous every 6 hours  midodrine. 20 milliGRAM(s) Oral three times a day  mupirocin 2% Ointment 1 Application(s) Topical two times a day  pantoprazole  Injectable 40 milliGRAM(s) IV Push two times a day  petrolatum Ophthalmic Ointment 1 Application(s) Right EYE two times a day  psyllium Powder 1 Packet(s) Oral daily  sodium chloride 2 Gram(s) Oral two times a day    MEDICATIONS  (PRN):  acetaminophen   Oral Liquid .. 650 milliGRAM(s) Oral every 6 hours PRN Temp greater or equal to 38C (100.4F), Mild Pain (1 - 3)  artificial tears (preservative free) Ophthalmic Solution 1 Drop(s) Both EYES three times a day PRN Dry Eyes  calamine/zinc oxide Lotion 1 Application(s) Topical two times a day PRN Rash and/or Itching  dextrose Oral Gel 15 Gram(s) Oral once PRN Blood Glucose LESS THAN 70 milliGRAM(s)/deciliter      Physical Exam:  General; Pt appears well and in no acute distress   HEENT:   Normal oral mucosa, PERRL, EOMI	  Lymphatic: No lymphadenopathy , no edema  Cardiovascular: Normal S1 S2, No JVD, No murmurs , Peripheral pulses palpable 2+ bilaterally  Respiratory: Lungs clear to auscultation, normal effort 	  Gastrointestinal:  Soft, Non-tender, + BS	  Skin: No rashes, No ecchymoses, No cyanosis, warm to touch  Musculoskeletal: Normal range of motion, normal strength  Neurology: A&O x 4, cranial nerves intact, motor and sensory strenghts are intact, no focal weakness, no drift, no dysarthria, no dysmetria,   Psychiatry:  Mood & affect appropriate    LABS                                            7.7                   Neurophils% (auto):   78.1   (09-26 @ 08:48):    7.19 )-----------(259          Lymphocytes% (auto):  9.6                                           24.2                   Eosinphils% (auto):   0.7      Manual%: Neutrophils x    ; Lymphocytes x    ; Eosinophils x    ; Bands%: x    ; Blasts x                                    128    |  91     |  109                 Calcium: 9.4   / iCa: x      (09-26 @ 06:15)    ----------------------------<  213       Magnesium: 2.40                             4.5     |  25     |  2.87             Phosphorous: 4.0            ASSESSMENT & PLAN:   73y-year-old Female with a past medical history of ______ , admitted for ___, now with _____, admitted to ICU for further assessment and management.     NEUROLOGY:  -   -    PULMONARY:  -  -    CARDIOLOGY:  -  -    GASTROINTESTINAL:  -  -    RENAL/:  -  -    INFECTIOUS DISEASE:  -   -    HEMATOLOGY:  -   -      ENDOCRINE  -    ETHICS/DISPOSITION:  -    - Transfer to ____ .     LINES/DRAINS/TUBES/DEVICES:  -  -    Above assesment and plan of care performed, created, and reviewed in real time with MICU attending physician  ____.     Kamila Hoang PA-C      ###################ATTENDING ATTESTATION###############################  This patient is critically ill and required frequent bedside visits with therapy change. I have personally provided 50 minutes of critical care time concurrently with the resident/fellow. This time excludes time spent on separate procedures and time spent teaching. I have reviewed the resident/fellow’s documentation and I agree with the assessment and plan of care.      Patient is a 72 yo F (wheelchair bound prior to admission) w/ HFpEF, T2DM, CKD5, latent TB (s/p tx,) hx breast ca (2018, s/p mastectomy and adjuvant CT/RT), and hx prior CVA s/p trach/PEG (decannulated prior to admission, ILR in place) who was initially admitted on 8/11/23 after p/w with acute hypoxemic and hypercapnic respiratory failure thought to be 2/2 Aspiration PNA vs ADHF/flash pulmonary edema in setting of HTN emergency. Patient required intubation and mechanical ventilationtubation, and was admitted to MICU for lengthy period of time.     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     Hospital course also marked by recurrent infections, most recently with MSSA bacteremia (now on Vanc through 10/2 due to possible cefepime vs. cefazolin drug-induced rash) and MSSA/ESBL EColi in BAL. Also found to have L otitis externa (9/5) s/p ENT debridement x2 (last 9/21) c/b concern for superimposed candida infection (s/p Meropenem, now on Fluconazole since 9/21). Also further found to have L popliteal DVT (initiated on heparin gtt).    Patient transferred back to MICU for worsening hypotension as well as rising creatinine/BUN in setting of ARF on CKD.    #Shock  #Encephalopathy  #Multiple Strokes  #New Acute/Subacute L parietal Stroke  #UGIB  #L popliteal DVT  #L fungal otitis externa  #L conjunctivitis  #Chronic respiratory failure  #Oropharyngeal dysphagia  #RUSS on CKD  - c/w vasopressors to maintain MAP > 65. POCUS with normal LV systolic function, small IVC  - Will trial Albumin q6h x 24 hours  - c/w mechanical ventilatory support, trach care per MICU team  - Will send blood, sputum, urine cultures  - Will change Vanco to Dapto as per ID for MSSA bacteremia. Will c/w Fluconazole. Will hold off expanding empiric abx now as no leukocytosis or fever. Low threshold to start  - CT CAP as per ID, no contrast however due to ARF/CKD  - Will repeat CTH given now asymmetrical pupils R > L  - c/w tube feeds, will need eventual PEG. 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 Will reinterrogate ILR  - f/u Optho recs     Mitchell Mcnamara MD  Pulmonary & Critical Care ICU Accept Note  ---------------------------  Transfer from:   Accepted by: MICU  Accepting Physician:    HOSPITAL COURSE      OBJECTIVE --  Vital Signs Last 24 Hrs  T(C): 37.6 (26 Sep 2023 12:00), Max: 37.6 (26 Sep 2023 12:00)  T(F): 99.7 (26 Sep 2023 12:00), Max: 99.7 (26 Sep 2023 12:00)  HR: 96 (26 Sep 2023 12:00) (78 - 101)  BP: 85/53 (26 Sep 2023 12:00) (68/45 - 99/48)  BP(mean): 59 (26 Sep 2023 12:00) (41 - 59)  RR: 16 (26 Sep 2023 12:00) (15 - 16)  SpO2: 96% (26 Sep 2023 12:00) (94% - 98%)    Parameters below as of 26 Sep 2023 12:00  Patient On (Oxygen Delivery Method): ventilator        I&O's Summary    25 Sep 2023 07:01  -  26 Sep 2023 07:00  --------------------------------------------------------  IN: 993.5 mL / OUT: 0 mL / NET: 993.5 mL    26 Sep 2023 07:01  -  26 Sep 2023 14:16  --------------------------------------------------------  IN: 398.5 mL / OUT: 250 mL / NET: 148.5 mL        MEDICATIONS  (STANDING):  acetic acid 0.25% Topical Irrigation 1 Application(s) Topical two times a day  albuterol/ipratropium for Nebulization 3 milliLiter(s) Nebulizer every 6 hours  atorvastatin 40 milliGRAM(s) Oral at bedtime  chlorhexidine 0.12% Liquid 15 milliLiter(s) Oral Mucosa every 12 hours  chlorhexidine 2% Cloths 1 Application(s) Topical daily  dextrose 5%. 1000 milliLiter(s) (100 mL/Hr) IV Continuous <Continuous>  dextrose 5%. 1000 milliLiter(s) (50 mL/Hr) IV Continuous <Continuous>  dextrose 50% Injectable 12.5 Gram(s) IV Push once  dextrose 50% Injectable 25 Gram(s) IV Push once  dextrose 50% Injectable 25 Gram(s) IV Push once  epoetin odalis (EPOGEN) Injectable 68860 Unit(s) SubCutaneous <User Schedule>  ferrous    sulfate Liquid 300 milliGRAM(s) Enteral Tube daily  fluconAZOLE IVPB 200 milliGRAM(s) IV Intermittent every 24 hours  glucagon  Injectable 1 milliGRAM(s) IntraMuscular once  heparin  Infusion 1050 Unit(s)/Hr (10.5 mL/Hr) IV Continuous <Continuous>  insulin lispro (ADMELOG) corrective regimen sliding scale   SubCutaneous every 6 hours  insulin NPH human recombinant 3 Unit(s) SubCutaneous every 6 hours  midodrine. 20 milliGRAM(s) Oral three times a day  mupirocin 2% Ointment 1 Application(s) Topical two times a day  pantoprazole  Injectable 40 milliGRAM(s) IV Push two times a day  petrolatum Ophthalmic Ointment 1 Application(s) Right EYE two times a day  psyllium Powder 1 Packet(s) Oral daily  sodium chloride 2 Gram(s) Oral two times a day    MEDICATIONS  (PRN):  acetaminophen   Oral Liquid .. 650 milliGRAM(s) Oral every 6 hours PRN Temp greater or equal to 38C (100.4F), Mild Pain (1 - 3)  artificial tears (preservative free) Ophthalmic Solution 1 Drop(s) Both EYES three times a day PRN Dry Eyes  calamine/zinc oxide Lotion 1 Application(s) Topical two times a day PRN Rash and/or Itching  dextrose Oral Gel 15 Gram(s) Oral once PRN Blood Glucose LESS THAN 70 milliGRAM(s)/deciliter      Physical Exam:  General; Pt appears well and in no acute distress   HEENT:   Normal oral mucosa, PERRL, EOMI	  Lymphatic: No lymphadenopathy , no edema  Cardiovascular: Normal S1 S2, No JVD, No murmurs , Peripheral pulses palpable 2+ bilaterally  Respiratory: Lungs clear to auscultation, normal effort 	  Gastrointestinal:  Soft, Non-tender, + BS	  Skin: No rashes, No ecchymoses, No cyanosis, warm to touch  Musculoskeletal: Normal range of motion, normal strength  Neurology: A&O x 4, cranial nerves intact, motor and sensory strenghts are intact, no focal weakness, no drift, no dysarthria, no dysmetria,   Psychiatry:  Mood & affect appropriate    LABS                                            7.7                   Neurophils% (auto):   78.1   (09-26 @ 08:48):    7.19 )-----------(259          Lymphocytes% (auto):  9.6                                           24.2                   Eosinphils% (auto):   0.7      Manual%: Neutrophils x    ; Lymphocytes x    ; Eosinophils x    ; Bands%: x    ; Blasts x                                    128    |  91     |  109                 Calcium: 9.4   / iCa: x      (09-26 @ 06:15)    ----------------------------<  213       Magnesium: 2.40                             4.5     |  25     |  2.87             Phosphorous: 4.0            ASSESSMENT & PLAN:   73y-year-old Female with a past medical history of ______ , admitted for ___, now with _____, admitted to ICU for further assessment and management.     NEUROLOGY:  -   -    PULMONARY:  -  -    CARDIOLOGY:  -  -    GASTROINTESTINAL:  -  -    RENAL/:  -  -    INFECTIOUS DISEASE:  -   -    HEMATOLOGY:  -   -      ENDOCRINE  -    ETHICS/DISPOSITION:  -    - Transfer to ____ .     LINES/DRAINS/TUBES/DEVICES:  -  -    Above assesment and plan of care performed, created, and reviewed in real time with MICU attending physician  ____.     Kamila Hoagn PA-C      ###################ATTENDING ATTESTATION###############################  This patient is critically ill and required frequent bedside visits with therapy change. I have personally provided 50 minutes of critical care time concurrently with the resident/fellow. This time excludes time spent on separate procedures and time spent teaching. I have reviewed the resident/fellow’s documentation and I agree with the assessment and plan of care.      Patient is a 72 yo F (wheelchair bound prior to admission) w/ HFpEF, T2DM, CKD5, latent TB (s/p tx,) hx breast ca (2018, s/p mastectomy and adjuvant CT/RT), and hx prior CVA s/p trach/PEG (decannulated prior to admission, ILR in place) who was initially admitted on 8/11/23 after p/w with acute hypoxemic and hypercapnic respiratory failure thought to be 2/2 Aspiration PNA vs ADHF/flash pulmonary edema in setting of HTN emergency. Patient required intubation and mechanical ventilationtubation, and was admitted to MICU for lengthy period of time.     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     Hospital course also marked by recurrent infections, most recently with MSSA bacteremia (now on Vanc through 10/2 due to possible cefepime vs. cefazolin drug-induced rash) and MSSA/ESBL EColi in BAL. Also found to have L otitis externa (9/5) s/p ENT debridement x2 (last 9/21) c/b concern for superimposed candida infection (s/p Meropenem, now on Fluconazole since 9/21). Also further found to have L popliteal DVT (initiated on heparin gtt).    Patient transferred back to MICU for worsening hypotension as well as rising creatinine/BUN in setting of ARF on CKD.    #Shock  #Encephalopathy  #Multiple Strokes  #New Acute/Subacute L parietal Stroke  #UGIB  #L popliteal DVT  #L fungal otitis externa  #L conjunctivitis  #Chronic respiratory failure  #Oropharyngeal dysphagia  #RUSS on CKD  - c/w vasopressors to maintain MAP > 65. POCUS with normal LV systolic function, small IVC  - Will trial Albumin q6h x 24 hours  - c/w mechanical ventilatory support, trach care per MICU team  - Will send blood, sputum, urine cultures  - Will change Vanco to Dapto as per ID for MSSA bacteremia. Will c/w Fluconazole. Will hold off expanding empiric abx now as no leukocytosis or fever. Low threshold to start  - CT CAP as per ID, no contrast however due to ARF/CKD  - Will repeat CTH given now asymmetrical pupils R > L  - c/w tube feeds, will need eventual PEG. 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 Will reinterrogate ILR  - f/u Optho recs     #GOC - Full Code    #DVT ppx - Resume heparin gtt if no contraindications seen on CT    #POCUS - See full POCUS note    Mitchell Mcnamara MD  Pulmonary & Critical Care ICU Accept Note  ---------------------------  Transfer from:   Accepted by: MICU  Accepting Physician:    HOSPITAL COURSE      OBJECTIVE --  Vital Signs Last 24 Hrs  T(C): 37.6 (26 Sep 2023 12:00), Max: 37.6 (26 Sep 2023 12:00)  T(F): 99.7 (26 Sep 2023 12:00), Max: 99.7 (26 Sep 2023 12:00)  HR: 96 (26 Sep 2023 12:00) (78 - 101)  BP: 85/53 (26 Sep 2023 12:00) (68/45 - 99/48)  BP(mean): 59 (26 Sep 2023 12:00) (41 - 59)  RR: 16 (26 Sep 2023 12:00) (15 - 16)  SpO2: 96% (26 Sep 2023 12:00) (94% - 98%)    Parameters below as of 26 Sep 2023 12:00  Patient On (Oxygen Delivery Method): ventilator        I&O's Summary    25 Sep 2023 07:01  -  26 Sep 2023 07:00  --------------------------------------------------------  IN: 993.5 mL / OUT: 0 mL / NET: 993.5 mL    26 Sep 2023 07:01  -  26 Sep 2023 14:16  --------------------------------------------------------  IN: 398.5 mL / OUT: 250 mL / NET: 148.5 mL        MEDICATIONS  (STANDING):  acetic acid 0.25% Topical Irrigation 1 Application(s) Topical two times a day  albuterol/ipratropium for Nebulization 3 milliLiter(s) Nebulizer every 6 hours  atorvastatin 40 milliGRAM(s) Oral at bedtime  chlorhexidine 0.12% Liquid 15 milliLiter(s) Oral Mucosa every 12 hours  chlorhexidine 2% Cloths 1 Application(s) Topical daily  dextrose 5%. 1000 milliLiter(s) (100 mL/Hr) IV Continuous <Continuous>  dextrose 5%. 1000 milliLiter(s) (50 mL/Hr) IV Continuous <Continuous>  dextrose 50% Injectable 12.5 Gram(s) IV Push once  dextrose 50% Injectable 25 Gram(s) IV Push once  dextrose 50% Injectable 25 Gram(s) IV Push once  epoetin odalis (EPOGEN) Injectable 24497 Unit(s) SubCutaneous <User Schedule>  ferrous    sulfate Liquid 300 milliGRAM(s) Enteral Tube daily  fluconAZOLE IVPB 200 milliGRAM(s) IV Intermittent every 24 hours  glucagon  Injectable 1 milliGRAM(s) IntraMuscular once  heparin  Infusion 1050 Unit(s)/Hr (10.5 mL/Hr) IV Continuous <Continuous>  insulin lispro (ADMELOG) corrective regimen sliding scale   SubCutaneous every 6 hours  insulin NPH human recombinant 3 Unit(s) SubCutaneous every 6 hours  midodrine. 20 milliGRAM(s) Oral three times a day  mupirocin 2% Ointment 1 Application(s) Topical two times a day  pantoprazole  Injectable 40 milliGRAM(s) IV Push two times a day  petrolatum Ophthalmic Ointment 1 Application(s) Right EYE two times a day  psyllium Powder 1 Packet(s) Oral daily  sodium chloride 2 Gram(s) Oral two times a day    MEDICATIONS  (PRN):  acetaminophen   Oral Liquid .. 650 milliGRAM(s) Oral every 6 hours PRN Temp greater or equal to 38C (100.4F), Mild Pain (1 - 3)  artificial tears (preservative free) Ophthalmic Solution 1 Drop(s) Both EYES three times a day PRN Dry Eyes  calamine/zinc oxide Lotion 1 Application(s) Topical two times a day PRN Rash and/or Itching  dextrose Oral Gel 15 Gram(s) Oral once PRN Blood Glucose LESS THAN 70 milliGRAM(s)/deciliter      Physical Exam:  General; Pt appears well and in no acute distress   HEENT:   Normal oral mucosa, PERRL, EOMI	  Lymphatic: No lymphadenopathy , no edema  Cardiovascular: Normal S1 S2, No JVD, No murmurs , Peripheral pulses palpable 2+ bilaterally  Respiratory: Lungs clear to auscultation, normal effort 	  Gastrointestinal:  Soft, Non-tender, + BS	  Skin: No rashes, No ecchymoses, No cyanosis, warm to touch  Musculoskeletal: Normal range of motion, normal strength  Neurology: A&O x 4, cranial nerves intact, motor and sensory strenghts are intact, no focal weakness, no drift, no dysarthria, no dysmetria,   Psychiatry:  Mood & affect appropriate    LABS                                            7.7                   Neurophils% (auto):   78.1   (09-26 @ 08:48):    7.19 )-----------(259          Lymphocytes% (auto):  9.6                                           24.2                   Eosinphils% (auto):   0.7      Manual%: Neutrophils x    ; Lymphocytes x    ; Eosinophils x    ; Bands%: x    ; Blasts x                                    128    |  91     |  109                 Calcium: 9.4   / iCa: x      (09-26 @ 06:15)    ----------------------------<  213       Magnesium: 2.40                             4.5     |  25     |  2.87             Phosphorous: 4.0            ASSESSMENT & PLAN:   73y-year-old Female with a past medical history of ______ , admitted for ___, now with _____, admitted to ICU for further assessment and management.     NEUROLOGY:  -   -    PULMONARY:  -  -    CARDIOLOGY:  -  -    GASTROINTESTINAL:  -  -    RENAL/:  -  -    INFECTIOUS DISEASE:  -   -    HEMATOLOGY:  -   -      ENDOCRINE  -    ETHICS/DISPOSITION:  -    - Transfer to ____ .     LINES/DRAINS/TUBES/DEVICES:  -  -    Above assesment and plan of care performed, created, and reviewed in real time with MICU attending physician  ____.     Kamila Hoang PA-C      ###################ATTENDING ATTESTATION###############################  This patient is critically ill and required frequent bedside visits with therapy change. I have personally provided 50 minutes of critical care time concurrently with the resident/fellow. This time excludes time spent on separate procedures and time spent teaching. I have reviewed the resident/fellow’s documentation and I agree with the assessment and plan of care.      Patient is a 72 yo F (wheelchair bound prior to admission) w/ HFpEF, T2DM, CKD5, latent TB (s/p tx,) hx breast ca (2018, s/p mastectomy and adjuvant CT/RT), and hx prior CVA s/p trach/PEG (decannulated prior to admission, ILR in place) who was initially admitted on 8/11/23 after p/w with acute hypoxemic and hypercapnic respiratory failure thought to be 2/2 Aspiration PNA vs ADHF/flash pulmonary edema in setting of HTN emergency. Patient required intubation and mechanical ventilationtubation, and was admitted to MICU for lengthy period of time.     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     Hospital course also marked by recurrent infections, most recently with MSSA bacteremia (now on Vanc through 10/2 due to possible cefepime vs. cefazolin drug-induced rash) and MSSA/ESBL EColi in BAL. Also found to have L otitis externa (9/5) s/p ENT debridement x2 (last 9/21) c/b concern for superimposed candida infection (s/p Meropenem, now on Fluconazole since 9/21). Also further found to have L popliteal DVT (initiated on heparin gtt).    Patient transferred back to MICU for worsening hypotension as well as rising creatinine/BUN in setting of ARF on CKD.    #Shock  #Encephalopathy  #Multiple Strokes  #New Acute/Subacute L parietal Stroke  #UGIB  #L popliteal DVT  #L fungal otitis externa  #L conjunctivitis  #Chronic respiratory failure  #Oropharyngeal dysphagia  #RUSS on CKD  #Pleural effusions  - c/w vasopressors to maintain MAP > 65. POCUS with normal LV systolic function, small IVC  - Will trial Albumin q6h x 24 hours  - c/w mechanical ventilatory support, trach care per MICU team  - Will send blood, sputum, urine cultures  - Will change Vanco to Dapto as per ID for MSSA bacteremia. Will c/w Fluconazole. Will hold off expanding empiric abx now as no leukocytosis or fever. Low threshold to start. f/u ID recs  - Holding off HD as per renal for now. c/w diuresis. Monitor BMP and UOP  - CT CAP as per ID, no contrast however due to ARF/CKD  - Will repeat CTH given now asymmetrical pupils R > L  - c/w tube feeds, will need eventual PEG. 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 Will reinterrogate ILR  - f/u Optho recs     #GOC - Full Code    #DVT ppx - Resume heparin gtt if no contraindications seen on CT    #POCUS - See full POCUS note    Mitchell Mcnamara MD  Pulmonary & Critical Care ICU Accept Note  ---------------------------  Transfer from:   Accepted by: MICU  Accepting Physician:      HOSPITAL COURSE  74 YO F with PMHx of IDDM, HTN, CKD, HFpEF, Breast Cancer s/p BL mastectomy, chemo/RT (2018), Respiratory and Cardiac Arrest (2018), left PCA occipital CVA with residual right hemiparesis with questionable embolic source s/p med[employer]onic ILR, and dysphagia with aspiration in the past requiring long ICU stay, tracheostomy and PEG (now decannulated) who presented for respiratory failure second to volume overload from HF vs progressive CKD requiring intubation and ICU admission. While in MICU patient noted with worsening renal failure requiring HD initiation, CGE/ Melena s/p EGD 8/31 found with esophagitis and erosive gastropathy, new right cerebellar infarct and fevers second to ESBL COLI and MSSA VAP, MSSA bacteremia, left ear otitis externa and drug rxn to cephalopsporins. Course further complicated by prolonged vent time s/p tracheostomy 9/1 and transferred to RCU 9/3  Was found to have left otitis externa (9/5) s/p serial bedside debridement with worsening necrotic tissue/erythema for which she was also initiated on meropenem. Family refusing contrast, non-contrast study obtained with evidence of bone involvement. Pt underwent ENT debridement x2 (last 9/21). Found to have new black spores and white discoloration, concern for candida infection. Given fevers and need for debridement fluconazole added to regimen 9/21. Will also continue with meropenem, CT imaging does not show significant enough bone involvement to require prolonged course for osteomyelitis. Plan to also complete vancomycin by level x4 week course through 10/2 (given inability to exclude endocarditis).   Pt off HD since 9/20. Pt with (+) UOP on diuretics. SHAKIRA Rosado removed as no longer functioning on 9/21. Finished prednisone taper for possible AIN. Strict I/Os and daily BMPs in place. Hospital course further c/b acute hypervolemic hyponatremia and uremia in the setting of renal failure not resolving despite hypertonic saline, diuretics, and salt tabs. S/P bumetanide continuous drip x24 hrs 2/2 worsening fluid overload and uremia. Pt now hypotensive and transferred to MICU for possible pressors.      OBJECTIVE --  Vital Signs Last 24 Hrs  T(C): 37.6 (26 Sep 2023 12:00), Max: 37.6 (26 Sep 2023 12:00)  T(F): 99.7 (26 Sep 2023 12:00), Max: 99.7 (26 Sep 2023 12:00)  HR: 96 (26 Sep 2023 12:00) (78 - 101)  BP: 85/53 (26 Sep 2023 12:00) (68/45 - 99/48)  BP(mean): 59 (26 Sep 2023 12:00) (41 - 59)  RR: 16 (26 Sep 2023 12:00) (15 - 16)  SpO2: 96% (26 Sep 2023 12:00) (94% - 98%)    Parameters below as of 26 Sep 2023 12:00  Patient On (Oxygen Delivery Method): ventilator      I&O's Summary    25 Sep 2023 07:01  -  26 Sep 2023 07:00  --------------------------------------------------------  IN: 993.5 mL / OUT: 0 mL / NET: 993.5 mL    26 Sep 2023 07:01  -  26 Sep 2023 14:16  --------------------------------------------------------  IN: 398.5 mL / OUT: 250 mL / NET: 148.5 mL      MEDICATIONS  (STANDING):  acetic acid 0.25% Topical Irrigation 1 Application(s) Topical two times a day  albuterol/ipratropium for Nebulization 3 milliLiter(s) Nebulizer every 6 hours  atorvastatin 40 milliGRAM(s) Oral at bedtime  chlorhexidine 0.12% Liquid 15 milliLiter(s) Oral Mucosa every 12 hours  chlorhexidine 2% Cloths 1 Application(s) Topical daily  dextrose 5%. 1000 milliLiter(s) (100 mL/Hr) IV Continuous <Continuous>  dextrose 5%. 1000 milliLiter(s) (50 mL/Hr) IV Continuous <Continuous>  dextrose 50% Injectable 12.5 Gram(s) IV Push once  dextrose 50% Injectable 25 Gram(s) IV Push once  dextrose 50% Injectable 25 Gram(s) IV Push once  epoetin odalis (EPOGEN) Injectable 29686 Unit(s) SubCutaneous <User Schedule>  ferrous    sulfate Liquid 300 milliGRAM(s) Enteral Tube daily  fluconAZOLE IVPB 200 milliGRAM(s) IV Intermittent every 24 hours  glucagon  Injectable 1 milliGRAM(s) IntraMuscular once  heparin  Infusion 1050 Unit(s)/Hr (10.5 mL/Hr) IV Continuous <Continuous>  insulin lispro (ADMELOG) corrective regimen sliding scale   SubCutaneous every 6 hours  insulin NPH human recombinant 3 Unit(s) SubCutaneous every 6 hours  midodrine. 20 milliGRAM(s) Oral three times a day  mupirocin 2% Ointment 1 Application(s) Topical two times a day  pantoprazole  Injectable 40 milliGRAM(s) IV Push two times a day  petrolatum Ophthalmic Ointment 1 Application(s) Right EYE two times a day  psyllium Powder 1 Packet(s) Oral daily  sodium chloride 2 Gram(s) Oral two times a day    MEDICATIONS  (PRN):  acetaminophen   Oral Liquid .. 650 milliGRAM(s) Oral every 6 hours PRN Temp greater or equal to 38C (100.4F), Mild Pain (1 - 3)  artificial tears (preservative free) Ophthalmic Solution 1 Drop(s) Both EYES three times a day PRN Dry Eyes  calamine/zinc oxide Lotion 1 Application(s) Topical two times a day PRN Rash and/or Itching  dextrose Oral Gel 15 Gram(s) Oral once PRN Blood Glucose LESS THAN 70 milliGRAM(s)/deciliter      Physical Exam:  General; Pt appears well and in no acute distress   HEENT:   Normal oral mucosa, PERRL, EOMI	  Lymphatic: No lymphadenopathy , no edema  Cardiovascular: Normal S1 S2, No JVD, No murmurs , Peripheral pulses palpable 2+ bilaterally  Respiratory: Lungs clear to auscultation, normal effort 	  Gastrointestinal:  Soft, Non-tender, + BS	  Skin: No rashes, No ecchymoses, No cyanosis, warm to touch  Musculoskeletal: Normal range of motion, normal strength  Neurology: A&O x 4, cranial nerves intact, motor and sensory strenghts are intact, no focal weakness, no drift, no dysarthria, no dysmetria,   Psychiatry:  Mood & affect appropriate    LABS                                            7.7                   Neurophils% (auto):   78.1   (09-26 @ 08:48):    7.19 )-----------(259          Lymphocytes% (auto):  9.6                                           24.2                   Eosinphils% (auto):   0.7      Manual%: Neutrophils x    ; Lymphocytes x    ; Eosinophils x    ; Bands%: x    ; Blasts x                                    128    |  91     |  109                 Calcium: 9.4   / iCa: x      (09-26 @ 06:15)    ----------------------------<  213       Magnesium: 2.40                             4.5     |  25     |  2.87             Phosphorous: 4.0            ASSESSMENT & PLAN:     74 YO F with PMHx of IDDM, HTN, CKD, HFpEF, Breast Cancer s/p BL mastectomy, chemotherapy and radiation (2018), Respiratory and Cardiac Arrest (2018), 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 While in MICU patient noted with worsening renal failure requiring HD initiation, CGE/ Melena s/p EGD 8/31 found with esophagitis and erosive gastropathy, new right cerebellar infarct and fevers second to ESBL COLI and MSSA VAP, MSSA bacteremia, left ear otitis externa and drug rxn to cephalosporins Course further complicated by prolonged vent time s/p tracheostomy 9/1 and transferred to RCU 9/3 completed by recurrent fevers with high PIP, respiratory acidosis and concern for volume overloaded.     NEUROLOGY  # Metabolic Encephalopath vs NEW cerebella infarct   - Baseline MS AOX3 with short term memory changes per family however noted with concern for poor mentation in ICU  - MRI (8/17) with NEW right cerebellar infarct and old left PCA/occipital infarcts  - STEPHANIE (8/17) with AV showing two linear mobile echogenic elements attached to valve leaflets consistent with Lambel's excrescence, but no TRINITY thrombus, and lambel's excrescence with uncertain clinical implication.   - EEG (8/11-8/15) with no seizures   - ILR interrogation (9/7) with SR and PACs falsely recorded as AF.   - Attempted to resume ASA for medical management but held given GIB   - Continue on Lipitor for medical management   - Course complicated by questionable head and body shaking 9/8 however prolactin elevated and shakes head yes/no to some questions.   - Lethargy noted - Pending Rpt CT Head- new left parietal infarct    CARDIOVASCULAR  # HTN Urgency   # Septic vs vasoplegic shock   - Labile BPs ranging in between SBP 80s-200s and attempted labetalol, however noted with hypotension and bradycardia likely from BB toxicity vs shock state?    - Holding Labetalol and Catapres   - c/w Cardura for now   - Hypotensive in the 80s systolic overnight, requiring Midodrine 20mg x 1 (resolved)   - Stable today, still mildly hypotensive, but no intervention at this time     # Hx of HFpEF with likely ADHF with volume overload.   - ECHO (7/2023) with EF 59 with severe LVH and diastolic dysfunction   - STEPHANIE (8/18) with normal LVRVSF however noted with increased LA pressures and persistent LA septal bowing into RA.   - ECHO (8/11) with EF 60 with mild LVH, normal LVRVSF, and mild ddfxn.  - Remove volume with HD sessions and intermittent bumex pushes as BP allows    - s/p Bumex 2mg IV BID for now  - Will start Bumex gtt at 0.5mg/hr - Monitor UOP and electrolytes- Bumex dc'd    HEENT   # Left ear OE   - ENT evaluation (9/7) with no mastoid tenderness, however found with positive swelling and excoriation to left auricle and tenderness to manipulation of auricle. Debrided crusting of auricle and EAC evaluated with edema and unable to visualize TM. EAC debrided and found with watery exudate.   - s/p CiproHC (9/5 - 9/16)   - Bradford discontinued. Rash on torso concerning for possible drug rash  - ENT following and ear wick change QD   - Wick out but needs ? Debridement wound care called/fu ent   - ENT recommending CT IAC w/ IV con but family is refusing as concerned given CKD, kidneys wouldn't recover from IV contrast. Spoke with Nephrology, ENT, and patient's  at length. Planned for CT non con and per , decision will be made from there but for now doesn't want to risk further harming kidneys.  - CT IAC showing acute on chronic left-sided otitis externa and acute on chronic left-sided otomastoiditis. Superimposed left-sided tympanosclerosis. Superimposed cholesteatoma formation within the left tympanomastoid cavity cannot be excluded. No evidence of malignant otitis externa.  - ENT consulted (9/20) for white purulent discharge from left ear, recc: ENT:  acetic acid drops BID to left ear. Mupirocin ointment to the left pinna BID, cover with xeroform after placing mupirocin ointment. Pressure offloading of the left ear.  + L eye redness in setting of surrounding infectious process - Ophthalmology consulted via team, recs pending     # Oral Lesions with questionable Zoster vs Trauma?   - Patient with tongue pain and seen with anterior ulcerations consolidating and crusting and posterior ulceration.  - Case discussed with pathology resident and culture/ cytology sent.   - HSV negative and Path (9/6) with normal appearing epithelial cells singly and in clusters devoid of viral cytopathic effect.   - Continue on magic mouth wash for pain    RESPIRATORY  # AHHRF second to volume overload   - Hx of CKD and HFpEF presented with SOB and hypercarbia second to volume overload requiring intubation and HD initiation   - Vent weaning attempted however limited requiring tracheostomy (9/1) with IP   - Course complicated by PIPs elevated (50s) and respiratory acidosis second to secretions vs volume ?    - Vent adjusted 20/300/5/30 without improvement.   - POCUS (9/8) with BL pleural effusions (large on right and small on left)   - CT CHEST (9/8) with TBM, BL pleural effusions and continued consolidations   - Continue on vent and given TBM increased PEEP (9/9) to 20/300/8/40 with overall improvement   - Continue on Duoneb and HTS for airway clearance   - Continue volume removal with diuresis and aggressive UF sessions.   - Discussing possible thora but appears improved and will monitor.  ]  - Bedside US showing decrease in pleural effusion - hold off thoracentesis 9/10     GI  # UGIB   - CGE x 1 episode on 8/24 and melena x 2 episodes on 8/31 likely residual blood.   - EGD (8/31) found with esophagitis and erosive gastropathy  - Continue on PPI BID through late October and then PPI QD   - No melena     # Dysphagia   - NGT-TF continued   - Pending PEG however needs improvement in infectious status prior to placement.     RENAL  # Progressive CKD   - Presented volume overload and progressive RUSS on CKD (baseline CRE in 2s and mode into the 3s on admission) likely obstruction vs low flow state with shock vs AIN from drug reaction issue?  - POCUS with no signs of hydronephrosis   - Pressor support started with improvement in renal function  - Attempted steroid course in ICU without improvement in renal function   - Case discussed at length with renal and initiated on HD in ICU and now continued on HD TTHS, however remains volume overloaded.   - Nephro following - Plan to resume HD TIW - Nephro recc holding HD alissa (9/21)   - Monitor renal function and UOP, and electrolytes for now  - Monitor urine output - Will perform bladder scan/straight cath as needed if retaining urine   - Nephro recs to start on Bumex gtt - Will monitor BMP closely     Hyponatremia (improving) Na 123>>125  - c/w Salt tabs - s/p Hypertonic 1.5%NS @ 50cc/hr x 5 hr  - Renal- no plan for HD at this time - Plan to start on Bumex gtt    # Hyperphosphatemia (resolved)   - PTH normal and elevated phos likely from renal failure  - s/p Phos binder with Renvela - now d'beth as level wnl      INFECTIOUS DISEASE  # ESBL ECOLI and MSSA VAP c/b MSSA bacteremia   - RVP/ COVID (8/11) negative   - SCx (8/11) with MSSA s/p cefepime (8/12-8/13) and then ancef (8/14) however noted with drug reaction and then changed to vanco and aztreonam (8/12-8/13) in ICU .   - Course complicated by recurrent fevers and return of MSSA with bacteremia.   - SCx (9/1) with MSSA and ESBL ECOLI   - BAL (9/1) with MSSA   - BCx (9/1) with MSSA and cleared on (9/4)   - ECHO (9/6) unable to rule out endocarditis   - Continue on Bradford (9/4 - ) and Vanco BY DOSE POST HD (9/4, 9/7, 9/9 ...) with duration TBD at this time.   - Given inability to rule out endocarditis will discuss possible 4 wks with ID?   - Course complicated by fever (9/7) and UA with leuks but no bacteria, however compared to prior improved, RVP/COVID and SCx negative, and RPT CXR similar to prior   - LE DOPPLERS- Blood and sputum cx NTD; Acute left deep vein thrombosis of the left popliteal vein.  - Febrile overnight 102 recultured and sent off   -Pt receiving vanco post HD however today given vanco 750mg x 1 will check Vanco level at end of HD session and dose   - BCx (9/20): sent- NTD  - ID recc CT C/A/P w/ IV contrast - on hold d/t unsure if pt tolerate HD alissa  - C/w fluconazole 200 qd (9/21)    # Left ear OE   - ENT evaluation (9/7) with no mastoid tenderness, however found with positive swelling and excoriation to left auricle and tenderness to manipulation of auricle. Debrided crusting of auricle and EAC evaluated with edema and unable to visualize TM. EAC debrided and found with watery exudate.   - s/p CiproHC (9/5 - 9/16)     # MRSA PCRs   - MRSA PCR (8/11 and 8/14) negative   - Next MRSA + PCR ordered for 10/8     HEME  # Anemia second to GIB vs renal disease   - Hold chemical DVT PPX given bleeding/ waxing and waning HH   - s/p multiple units of PRBCs (8/15, 8/21, 8/28, 8/31 and 9/8)   - Anemia panel with AOCD but with low %sat and ferrous sulfate added to optimize   - Monitor HH and discuss with renal for EPO sometime next week.   - Spoke with GI for clearance to start Heparin gtt for + DVT   - c/w Heparin gtt (9/21)    Vascular  # DVT  - Pt with + popliteal DVT on SCD Spoke with GI no absolute contraindication for starting Heparin - advise close monitoring for bleeding - Do stat CTA if bleeding occurs and call IR   - Pt specific heparin gtt started with goal PTT 60-85  - will monitor closely   - c/w Heparin gtt (9/21)    ONC   # Hx of Breast CA   - Patient dx in 2018 and s/p BL mastectomy (radical on right) and chemo and RT.   - Supportive care.     ENDOCRINE  # IDDM2   - Continue on NPH 3U and ISS   - Monitor FS and adjust as needed     LINES/ TUBES  - R IJ SHILEY (8/19 - 9/21)     SKIN  # Drug Eruption   - Attempted cefepime (8/12) vs ancef (8/13-8/14) and then noted with drug rxn.   - Hold further cephalosporins at this time   - s/p Steroids with prednisone 40 (8/18 - 9/2) then prednisone 30 (9/3-9/7), then prednisone 20 (9/8 - 9/10), then prednisone 10mg (9/11-9/13) then turn off.     ETHICS/ GOC    - FULL CODE     Follow Up:  Monitor BP-  Midodrine just increased today from 10mg to 20 TID and bumex gtt was dc'd  Continue IV abx for MSSA bacteremia- ID following  Continue Fluconazole for fungal spores- found on ear culture  May need HD/CVVH- F/U w/ Renal  Neuro re-consulted as found to have new left parietal infarct. (Dr. Katty Charles-private to see pt tomorrow 9/27)      ###################ATTENDING ATTESTATION###############################  This patient is critically ill and required frequent bedside visits with therapy change. I have personally provided 50 minutes of critical care time concurrently with the resident/fellow. This time excludes time spent on separate procedures and time spent teaching. I have reviewed the resident/fellow’s documentation and I agree with the assessment and plan of care.      Patient is a 74 yo F (wheelchair bound prior to admission) w/ HFpEF, T2DM, CKD5, latent TB (s/p tx,) hx breast ca (2018, s/p mastectomy and adjuvant CT/RT), and hx prior CVA s/p trach/PEG (decannulated prior to admission, ILR in place) who was initially admitted on 8/11/23 after p/w with acute hypoxemic and hypercapnic respiratory failure thought to be 2/2 Aspiration PNA vs ADHF/flash pulmonary edema in setting of HTN emergency. Patient required intubation and mechanical ventilationtubation, and was admitted to MICU for lengthy period of time.     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     Hospital course also marked by recurrent infections, most recently with MSSA bacteremia (now on Vanc through 10/2 due to possible cefepime vs. cefazolin drug-induced rash) and MSSA/ESBL EColi in BAL. Also found to have L otitis externa (9/5) s/p ENT debridement x2 (last 9/21) c/b concern for superimposed candida infection (s/p Meropenem, now on Fluconazole since 9/21). Also further found to have L popliteal DVT (initiated on heparin gtt).    Patient transferred back to MICU for worsening hypotension as well as rising creatinine/BUN in setting of ARF on CKD.    #Shock  #Encephalopathy  #Multiple Strokes  #New Acute/Subacute L parietal Stroke  #UGIB  #L popliteal DVT  #L fungal otitis externa  #L conjunctivitis  #Chronic respiratory failure  #Oropharyngeal dysphagia  #RUSS on CKD  #Pleural effusions  - c/w vasopressors to maintain MAP > 65. POCUS with normal LV systolic function, small IVC  - Will trial Albumin q6h x 24 hours  - c/w mechanical ventilatory support, trach care per MICU team  - Will send blood, sputum, urine cultures  - Will change Vanco to Dapto as per ID for MSSA bacteremia. Will c/w Fluconazole. Will hold off expanding empiric abx now as no leukocytosis or fever. Low threshold to start. f/u ID recs  - Holding off HD as per renal for now. c/w diuresis. Monitor BMP and UOP  - CT CAP as per ID, no contrast however due to ARF/CKD  - Will repeat CTH given now asymmetrical pupils R > L  - c/w tube feeds, will need eventual PEG. 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 Will reinterrogate ILR  - f/u Optho recs     #GOC - Full Code    #DVT ppx - Resume heparin gtt if no contraindications seen on CT    #POCUS - See full POCUS note    Mitchell Mcnamara MD  Pulmonary & Critical Care ICU Accept Note  ---------------------------  Transfer from: RCU  Accepted by: MICU  Accepting Physician: Dr. 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 While in MICU patient noted with worsening renal failure requiring HD initiation, CGE/ Melena s/p EGD 8/31 found with esophagitis and erosive gastropathy, new right cerebellar infarct and fevers second to ESBL COLI and MSSA VAP, MSSA bacteremia, left ear otitis externa and drug rxn to cephalopsporins. Course further complicated by prolonged vent time s/p tracheostomy 9/1 and transferred to RCU 9/3  Was found to have left otitis externa (9/5) s/p serial bedside debridement with worsening necrotic tissue/erythema for which she was also initiated on meropenem. Family refusing contrast, non-contrast study obtained with evidence of bone involvement. Pt underwent ENT debridement x2 (last 9/21). Found to have new black spores and white discoloration, concern for candida infection. Given fevers and need for debridement fluconazole added to regimen 9/21. Will also continue with meropenem, CT imaging does not show significant enough bone involvement to require prolonged course for osteomyelitis. Plan to also complete vancomycin by level x4 week course through 10/2 (given inability to exclude endocarditis).   Pt off HD since 9/20. Pt with (+) UOP on diuretics. SHAKIRA Rosado removed as no longer functioning on 9/21. Finished prednisone taper for possible AIN. Strict I/Os and daily BMPs in place. Hospital course further c/b acute hypervolemic hyponatremia and uremia in the setting of renal failure not resolving despite hypertonic saline, diuretics, and salt tabs. S/P bumetanide continuous drip x24 hrs 2/2 worsening fluid overload and uremia. Pt now hypotensive and transferred to MICU for possible pressors.      OBJECTIVE --  Vital Signs Last 24 Hrs  T(C): 37.6 (26 Sep 2023 12:00), Max: 37.6 (26 Sep 2023 12:00)  T(F): 99.7 (26 Sep 2023 12:00), Max: 99.7 (26 Sep 2023 12:00)  HR: 96 (26 Sep 2023 12:00) (78 - 101)  BP: 85/53 (26 Sep 2023 12:00) (68/45 - 99/48)  BP(mean): 59 (26 Sep 2023 12:00) (41 - 59)  RR: 16 (26 Sep 2023 12:00) (15 - 16)  SpO2: 96% (26 Sep 2023 12:00) (94% - 98%)    Parameters below as of 26 Sep 2023 12:00  Patient On (Oxygen Delivery Method): ventilator      I&O's Summary    25 Sep 2023 07:01  -  26 Sep 2023 07:00  --------------------------------------------------------  IN: 993.5 mL / OUT: 0 mL / NET: 993.5 mL    26 Sep 2023 07:01  -  26 Sep 2023 14:16  --------------------------------------------------------  IN: 398.5 mL / OUT: 250 mL / NET: 148.5 mL      MEDICATIONS  (STANDING):  acetic acid 0.25% Topical Irrigation 1 Application(s) Topical two times a day  albuterol/ipratropium for Nebulization 3 milliLiter(s) Nebulizer every 6 hours  atorvastatin 40 milliGRAM(s) Oral at bedtime  chlorhexidine 0.12% Liquid 15 milliLiter(s) Oral Mucosa every 12 hours  chlorhexidine 2% Cloths 1 Application(s) Topical daily  dextrose 5%. 1000 milliLiter(s) (100 mL/Hr) IV Continuous <Continuous>  dextrose 5%. 1000 milliLiter(s) (50 mL/Hr) IV Continuous <Continuous>  dextrose 50% Injectable 12.5 Gram(s) IV Push once  dextrose 50% Injectable 25 Gram(s) IV Push once  dextrose 50% Injectable 25 Gram(s) IV Push once  epoetin odalis (EPOGEN) Injectable 76898 Unit(s) SubCutaneous <User Schedule>  ferrous    sulfate Liquid 300 milliGRAM(s) Enteral Tube daily  fluconAZOLE IVPB 200 milliGRAM(s) IV Intermittent every 24 hours  glucagon  Injectable 1 milliGRAM(s) IntraMuscular once  heparin  Infusion 1050 Unit(s)/Hr (10.5 mL/Hr) IV Continuous <Continuous>  insulin lispro (ADMELOG) corrective regimen sliding scale   SubCutaneous every 6 hours  insulin NPH human recombinant 3 Unit(s) SubCutaneous every 6 hours  midodrine. 20 milliGRAM(s) Oral three times a day  mupirocin 2% Ointment 1 Application(s) Topical two times a day  pantoprazole  Injectable 40 milliGRAM(s) IV Push two times a day  petrolatum Ophthalmic Ointment 1 Application(s) Right EYE two times a day  psyllium Powder 1 Packet(s) Oral daily  sodium chloride 2 Gram(s) Oral two times a day    MEDICATIONS  (PRN):  acetaminophen   Oral Liquid .. 650 milliGRAM(s) Oral every 6 hours PRN Temp greater or equal to 38C (100.4F), Mild Pain (1 - 3)  artificial tears (preservative free) Ophthalmic Solution 1 Drop(s) Both EYES three times a day PRN Dry Eyes  calamine/zinc oxide Lotion 1 Application(s) Topical two times a day PRN Rash and/or Itching  dextrose Oral Gel 15 Gram(s) Oral once PRN Blood Glucose LESS THAN 70 milliGRAM(s)/deciliter      Physical Exam:  General; Pt appears well and in no acute distress   HEENT:   Normal oral mucosa, PERRL, EOMI	  Lymphatic: No lymphadenopathy , no edema  Cardiovascular: Normal S1 S2, No JVD, No murmurs , Peripheral pulses palpable 2+ bilaterally  Respiratory: Lungs clear to auscultation, normal effort 	  Gastrointestinal:  Soft, Non-tender, + BS	  Skin: No rashes, No ecchymoses, No cyanosis, warm to touch  Musculoskeletal: Normal range of motion, normal strength  Neurology: A&O x 4, cranial nerves intact, motor and sensory strenghts are intact, no focal weakness, no drift, no dysarthria, no dysmetria,   Psychiatry:  Mood & affect appropriate    LABS                                            7.7                   Neurophils% (auto):   78.1   (09-26 @ 08:48):    7.19 )-----------(259          Lymphocytes% (auto):  9.6                                           24.2                   Eosinphils% (auto):   0.7      Manual%: Neutrophils x    ; Lymphocytes x    ; Eosinophils x    ; Bands%: x    ; Blasts x                                    128    |  91     |  109                 Calcium: 9.4   / iCa: x      (09-26 @ 06:15)    ----------------------------<  213       Magnesium: 2.40                             4.5     |  25     |  2.87             Phosphorous: 4.0            ASSESSMENT & PLAN:     74 YO F with PMHx of IDDM, HTN, CKD, HFpEF, Breast Cancer s/p BL mastectomy, chemotherapy and radiation (2018), Respiratory and Cardiac Arrest (2018), 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 While in MICU patient noted with worsening renal failure requiring HD initiation, CGE/ Melena s/p EGD 8/31 found with esophagitis and erosive gastropathy, new right cerebellar infarct and fevers second to ESBL COLI and MSSA VAP, MSSA bacteremia, left ear otitis externa and drug rxn to cephalosporins Course further complicated by prolonged vent time s/p tracheostomy 9/1 and transferred to RCU 9/3 completed by recurrent fevers with high PIP, respiratory acidosis and concern for volume overloaded.     NEUROLOGY  # Metabolic Encephalopath vs NEW cerebella infarct   - Baseline MS AOX3 with short term memory changes per family however noted with concern for poor mentation in ICU  - MRI (8/17) with NEW right cerebellar infarct and old left PCA/occipital infarcts  - STEPHANIE (8/17) with AV showing two linear mobile echogenic elements attached to valve leaflets consistent with Lambel's excrescence, but no TRINITY thrombus, and lambel's excrescence with uncertain clinical implication.   - EEG (8/11-8/15) with no seizures   - ILR interrogation (9/7) with SR and PACs falsely recorded as AF.   - Attempted to resume ASA for medical management but held given GIB   - Continue on Lipitor for medical management   - Course complicated by questionable head and body shaking 9/8 however prolactin elevated and shakes head yes/no to some questions.   - Lethargy noted - Pending Rpt CT Head- new left parietal infarct    CARDIOVASCULAR  # HTN Urgency   # Septic vs vasoplegic shock   - Labile BPs ranging in between SBP 80s-200s and attempted labetalol, however noted with hypotension and bradycardia likely from BB toxicity vs shock state?    - Holding Labetalol and Catapres   - c/w Cardura for now   - Hypotensive in the 80s systolic overnight, requiring Midodrine 20mg x 1 (resolved)   - Stable today, still mildly hypotensive, but no intervention at this time     # Hx of HFpEF with likely ADHF with volume overload.   - ECHO (7/2023) with EF 59 with severe LVH and diastolic dysfunction   - STEPHANIE (8/18) with normal LVRVSF however noted with increased LA pressures and persistent LA septal bowing into RA.   - ECHO (8/11) with EF 60 with mild LVH, normal LVRVSF, and mild ddfxn.  - Remove volume with HD sessions and intermittent bumex pushes as BP allows    - s/p Bumex 2mg IV BID for now  - Will start Bumex gtt at 0.5mg/hr - Monitor UOP and electrolytes- Bumex dc'd    HEENT   # Left ear OE   - ENT evaluation (9/7) with no mastoid tenderness, however found with positive swelling and excoriation to left auricle and tenderness to manipulation of auricle. Debrided crusting of auricle and EAC evaluated with edema and unable to visualize TM. EAC debrided and found with watery exudate.   - s/p CiproHC (9/5 - 9/16)   - Bradford discontinued. Rash on torso concerning for possible drug rash  - ENT following and ear wick change QD   - Wick out but needs ? Debridement wound care called/fu ent   - ENT recommending CT IAC w/ IV con but family is refusing as concerned given CKD, kidneys wouldn't recover from IV contrast. Spoke with Nephrology, ENT, and patient's  at length. Planned for CT non con and per , decision will be made from there but for now doesn't want to risk further harming kidneys.  - CT IAC showing acute on chronic left-sided otitis externa and acute on chronic left-sided otomastoiditis. Superimposed left-sided tympanosclerosis. Superimposed cholesteatoma formation within the left tympanomastoid cavity cannot be excluded. No evidence of malignant otitis externa.  - ENT consulted (9/20) for white purulent discharge from left ear, recc: ENT:  acetic acid drops BID to left ear. Mupirocin ointment to the left pinna BID, cover with xeroform after placing mupirocin ointment. Pressure offloading of the left ear.  + L eye redness in setting of surrounding infectious process - Ophthalmology consulted via team, recs pending     # Oral Lesions with questionable Zoster vs Trauma?   - Patient with tongue pain and seen with anterior ulcerations consolidating and crusting and posterior ulceration.  - Case discussed with pathology resident and culture/ cytology sent.   - HSV negative and Path (9/6) with normal appearing epithelial cells singly and in clusters devoid of viral cytopathic effect.   - Continue on magic mouth wash for pain    RESPIRATORY  # AHHRF second to volume overload   - Hx of CKD and HFpEF presented with SOB and hypercarbia second to volume overload requiring intubation and HD initiation   - Vent weaning attempted however limited requiring tracheostomy (9/1) with IP   - Course complicated by PIPs elevated (50s) and respiratory acidosis second to secretions vs volume ?    - Vent adjusted 20/300/5/30 without improvement.   - POCUS (9/8) with BL pleural effusions (large on right and small on left)   - CT CHEST (9/8) with TBM, BL pleural effusions and continued consolidations   - Continue on vent and given TBM increased PEEP (9/9) to 20/300/8/40 with overall improvement   - Continue on Duoneb and HTS for airway clearance   - Continue volume removal with diuresis and aggressive UF sessions.   - Discussing possible thora but appears improved and will monitor.  ]  - Bedside US showing decrease in pleural effusion - hold off thoracentesis 9/10     GI  # UGIB   - CGE x 1 episode on 8/24 and melena x 2 episodes on 8/31 likely residual blood.   - EGD (8/31) found with esophagitis and erosive gastropathy  - Continue on PPI BID through late October and then PPI QD   - No melena     # Dysphagia   - NGT-TF continued   - Pending PEG however needs improvement in infectious status prior to placement.     RENAL  # Progressive CKD   - Presented volume overload and progressive RUSS on CKD (baseline CRE in 2s and mode into the 3s on admission) likely obstruction vs low flow state with shock vs AIN from drug reaction issue?  - POCUS with no signs of hydronephrosis   - Pressor support started with improvement in renal function  - Attempted steroid course in ICU without improvement in renal function   - Case discussed at length with renal and initiated on HD in ICU and now continued on HD TTHS, however remains volume overloaded.   - Nephro following - Plan to resume HD TIW - Nephro recc holding HD alissa (9/21)   - Monitor renal function and UOP, and electrolytes for now  - Monitor urine output - Will perform bladder scan/straight cath as needed if retaining urine   - Nephro recs to start on Bumex gtt - Will monitor BMP closely     Hyponatremia (improving) Na 123>>125  - c/w Salt tabs - s/p Hypertonic 1.5%NS @ 50cc/hr x 5 hr  - Renal- no plan for HD at this time - Plan to start on Bumex gtt    # Hyperphosphatemia (resolved)   - PTH normal and elevated phos likely from renal failure  - s/p Phos binder with Renvela - now d'beth as level wnl      INFECTIOUS DISEASE  # ESBL ECOLI and MSSA VAP c/b MSSA bacteremia   - RVP/ COVID (8/11) negative   - SCx (8/11) with MSSA s/p cefepime (8/12-8/13) and then ancef (8/14) however noted with drug reaction and then changed to vanco and aztreonam (8/12-8/13) in ICU .   - Course complicated by recurrent fevers and return of MSSA with bacteremia.   - SCx (9/1) with MSSA and ESBL ECOLI   - BAL (9/1) with MSSA   - BCx (9/1) with MSSA and cleared on (9/4)   - ECHO (9/6) unable to rule out endocarditis   - Continue on Bradford (9/4 - ) and Vanco BY DOSE POST HD (9/4, 9/7, 9/9 ...) with duration TBD at this time.   - Given inability to rule out endocarditis will discuss possible 4 wks with ID?   - Course complicated by fever (9/7) and UA with leuks but no bacteria, however compared to prior improved, RVP/COVID and SCx negative, and RPT CXR similar to prior   - LE DOPPLERS- Blood and sputum cx NTD; Acute left deep vein thrombosis of the left popliteal vein.  - Febrile overnight 102 recultured and sent off   -Pt receiving vanco post HD however today given vanco 750mg x 1 will check Vanco level at end of HD session and dose   - BCx (9/20): sent- NTD  - ID recc CT C/A/P w/ IV contrast - on hold d/t unsure if pt tolerate HD alissa  - C/w fluconazole 200 qd (9/21)    # Left ear OE   - ENT evaluation (9/7) with no mastoid tenderness, however found with positive swelling and excoriation to left auricle and tenderness to manipulation of auricle. Debrided crusting of auricle and EAC evaluated with edema and unable to visualize TM. EAC debrided and found with watery exudate.   - s/p CiproHC (9/5 - 9/16)     # MRSA PCRs   - MRSA PCR (8/11 and 8/14) negative   - Next MRSA + PCR ordered for 10/8     HEME  # Anemia second to GIB vs renal disease   - Hold chemical DVT PPX given bleeding/ waxing and waning HH   - s/p multiple units of PRBCs (8/15, 8/21, 8/28, 8/31 and 9/8)   - Anemia panel with AOCD but with low %sat and ferrous sulfate added to optimize   - Monitor HH and discuss with renal for EPO sometime next week.   - Spoke with GI for clearance to start Heparin gtt for + DVT   - c/w Heparin gtt (9/21)    Vascular  # DVT  - Pt with + popliteal DVT on SCD Spoke with GI no absolute contraindication for starting Heparin - advise close monitoring for bleeding - Do stat CTA if bleeding occurs and call IR   - Pt specific heparin gtt started with goal PTT 60-85  - will monitor closely   - c/w Heparin gtt (9/21)    ONC   # Hx of Breast CA   - Patient dx in 2018 and s/p BL mastectomy (radical on right) and chemo and RT.   - Supportive care.     ENDOCRINE  # IDDM2   - Continue on NPH 3U and ISS   - Monitor FS and adjust as needed     LINES/ TUBES  - R IJ SHILEY (8/19 - 9/21)     SKIN  # Drug Eruption   - Attempted cefepime (8/12) vs ancef (8/13-8/14) and then noted with drug rxn.   - Hold further cephalosporins at this time   - s/p Steroids with prednisone 40 (8/18 - 9/2) then prednisone 30 (9/3-9/7), then prednisone 20 (9/8 - 9/10), then prednisone 10mg (9/11-9/13) then turn off.     ETHICS/ GOC    - FULL CODE     Follow Up:  Monitor BP-  Midodrine just increased today from 10mg to 20 TID and bumex gtt was dc'd  Continue IV abx for MSSA bacteremia- ID following  Continue Fluconazole for fungal spores- found on ear culture  May need HD/CVVH- F/U w/ Renal  Neuro re-consulted as found to have new left parietal infarct. (Dr. Katty Charles-private to see pt tomorrow 9/27)      ###################ATTENDING ATTESTATION###############################  This patient is critically ill and required frequent bedside visits with therapy change. I have personally provided 50 minutes of critical care time concurrently with the resident/fellow. This time excludes time spent on separate procedures and time spent teaching. I have reviewed the resident/fellow’s documentation and I agree with the assessment and plan of care.      Patient is a 72 yo F (wheelchair bound prior to admission) w/ HFpEF, T2DM, CKD5, latent TB (s/p tx,) hx breast ca (2018, s/p mastectomy and adjuvant CT/RT), and hx prior CVA s/p trach/PEG (decannulated prior to admission, ILR in place) who was initially admitted on 8/11/23 after p/w with acute hypoxemic and hypercapnic respiratory failure thought to be 2/2 Aspiration PNA vs ADHF/flash pulmonary edema in setting of HTN emergency. Patient required intubation and mechanical ventilationtubation, and was admitted to MICU for lengthy period of time.     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     Hospital course also marked by recurrent infections, most recently with MSSA bacteremia (now on Vanc through 10/2 due to possible cefepime vs. cefazolin drug-induced rash) and MSSA/ESBL EColi in BAL. Also found to have L otitis externa (9/5) s/p ENT debridement x2 (last 9/21) c/b concern for superimposed candida infection (s/p Meropenem, now on Fluconazole since 9/21). Also further found to have L popliteal DVT (initiated on heparin gtt).    Patient transferred back to MICU for worsening hypotension as well as rising creatinine/BUN in setting of ARF on CKD.    #Shock  #Encephalopathy  #Multiple Strokes  #New Acute/Subacute L parietal Stroke  #UGIB  #L popliteal DVT  #L fungal otitis externa  #L conjunctivitis  #Chronic respiratory failure  #Oropharyngeal dysphagia  #RUSS on CKD  #Pleural effusions  - c/w vasopressors to maintain MAP > 65. POCUS with normal LV systolic function, small IVC  - Will trial Albumin q6h x 24 hours  - c/w mechanical ventilatory support, trach care per MICU team  - Will send blood, sputum, urine cultures  - Will change Vanco to Dapto as per ID for MSSA bacteremia. Will c/w Fluconazole. Will hold off expanding empiric abx now as no leukocytosis or fever. Low threshold to start. f/u ID recs  - Holding off HD as per renal for now. c/w diuresis. Monitor BMP and UOP  - CT CAP as per ID, no contrast however due to ARF/CKD  - Will repeat CTH given now asymmetrical pupils R > L  - c/w tube feeds, will need eventual PEG. 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 Will reinterrogate ILR  - f/u Optho recs     #GOC - Full Code    #DVT ppx - Resume heparin gtt if no contraindications seen on CT    #POCUS - See full POCUS note    Mitchell Mcnamara MD  Pulmonary & Critical Care

## 2023-09-26 NOTE — PROGRESS NOTE ADULT - SUBJECTIVE AND OBJECTIVE BOX
Subjective: Patient seen and examined. No new events except as noted.     REVIEW OF SYSTEMS:    CONSTITUTIONAL: No weakness, fevers or chills  EYES/ENT: No visual changes;  No vertigo or throat pain   NECK: No pain or stiffness  RESPIRATORY: No cough, wheezing, hemoptysis; No shortness of breath  CARDIOVASCULAR: No chest pain or palpitations  GASTROINTESTINAL: No abdominal or epigastric pain. No nausea, vomiting, or hematemesis; No diarrhea or constipation. No melena or hematochezia.  GENITOURINARY: No dysuria, frequency or hematuria  NEUROLOGICAL: No numbness or weakness  SKIN: No itching, burning, rashes, or lesions   All other review of systems is negative unless indicated above.    MEDICATIONS:  MEDICATIONS  (STANDING):  acetic acid 0.25% Topical Irrigation 1 Application(s) Topical two times a day  albuterol/ipratropium for Nebulization 3 milliLiter(s) Nebulizer every 6 hours  atorvastatin 40 milliGRAM(s) Oral at bedtime  buMETAnide Infusion 0.5 mG/Hr (2.5 mL/Hr) IV Continuous <Continuous>  chlorhexidine 0.12% Liquid 15 milliLiter(s) Oral Mucosa every 12 hours  chlorhexidine 2% Cloths 1 Application(s) Topical daily  dextrose 5%. 1000 milliLiter(s) (50 mL/Hr) IV Continuous <Continuous>  dextrose 5%. 1000 milliLiter(s) (100 mL/Hr) IV Continuous <Continuous>  dextrose 50% Injectable 12.5 Gram(s) IV Push once  dextrose 50% Injectable 25 Gram(s) IV Push once  dextrose 50% Injectable 25 Gram(s) IV Push once  epoetin odalis (EPOGEN) Injectable 40906 Unit(s) SubCutaneous <User Schedule>  ferrous    sulfate Liquid 300 milliGRAM(s) Enteral Tube daily  fluconAZOLE IVPB 200 milliGRAM(s) IV Intermittent every 24 hours  glucagon  Injectable 1 milliGRAM(s) IntraMuscular once  heparin  Infusion 1050 Unit(s)/Hr (10.5 mL/Hr) IV Continuous <Continuous>  insulin lispro (ADMELOG) corrective regimen sliding scale   SubCutaneous every 6 hours  insulin NPH human recombinant 3 Unit(s) SubCutaneous every 6 hours  midodrine. 10 milliGRAM(s) Oral three times a day  mupirocin 2% Ointment 1 Application(s) Topical two times a day  pantoprazole  Injectable 40 milliGRAM(s) IV Push two times a day  petrolatum Ophthalmic Ointment 1 Application(s) Right EYE two times a day  psyllium Powder 1 Packet(s) Oral daily  sodium chloride 2 Gram(s) Oral two times a day      PHYSICAL EXAM:  T(C): 37.2 (09-26-23 @ 08:00), Max: 37.4 (09-25-23 @ 15:53)  HR: 88 (09-26-23 @ 08:00) (78 - 102)  BP: 86/28 (09-26-23 @ 08:00) (68/45 - 99/48)  RR: 16 (09-26-23 @ 08:00) (15 - 16)  SpO2: 98% (09-26-23 @ 08:00) (94% - 98%)  Wt(kg): --  I&O's Summary    25 Sep 2023 07:01  -  26 Sep 2023 07:00  --------------------------------------------------------  IN: 993.5 mL / OUT: 0 mL / NET: 993.5 mL          Appearance: NAD, +trach  HEENT: dry oral mucosa  Lymphatic: No lymphadenopathy  Cardiovascular: Normal S1 S2, No JVD, No murmurs, No edema  Respiratory: Decreased BS, + trach to vent	  Neuro: opens eyes  Gastrointestinal: Soft, Non-tender, + BS, + NGT  Skin: No rashes, No ecchymoses, No cyanosis	  Extremities: No strength/ROM 2/2 sedation, + BL LE edema  Vascular: Peripheral pulses palpable 2+ bilaterally    Mode: AC/ CMV (Assist Control/ Continuous Mandatory Ventilation), RR (machine): 17, TV (machine): 340, FiO2: 35, PEEP: 8, ITime: 0.84, MAP: 15, PIP: 46      LABS:    CARDIAC MARKERS:                                7.6    5.17  )-----------( 212      ( 25 Sep 2023 05:15 )             24.3     09-25    128<L>  |  93<L>  |  102<H>  ----------------------------<  200<H>  4.4   |  26  |  2.88<H>    Ca    9.3      25 Sep 2023 21:30  Phos  3.9     09-25  Mg     2.40     09-25        TELEMETRY: 	    ECG:  	  RADIOLOGY:   DIAGNOSTIC TESTING:  [ ] Echocardiogram:  [ ]  Catheterization:  [ ] Stress Test:    OTHER:

## 2023-09-26 NOTE — PROCEDURE NOTE - NSCHLORHEXIDINEBATH_GEN_A_CORE
2% wipes/To be discontinued when line removed
2% wipes
2% wipes/To be discontinued when line removed

## 2023-09-26 NOTE — CHART NOTE - NSCHARTNOTEFT_GEN_A_CORE
Called by RN to evaluate pt. with hypotension reported at 0115 am as 85/33 mm Hg. Seen and evaluated the patient with daughter at bedside.  Patient's repeat BP while the writer was in the room noted to be 91/30 mm hg. Patient is at baseline mental status. Unable to obtain ROS due to mental status. No tachycardia, hypoxia, fevers noted at this time. Patient was started on bumex drip since yesterday.          Vital Signs Last 24 Hrs  T(C): 36.9 (25 Sep 2023 23:56), Max: 37.4 (25 Sep 2023 15:53)  T(F): 98.5 (25 Sep 2023 23:56), Max: 99.4 (25 Sep 2023 15:53)  HR: 78 (26 Sep 2023 04:27) (78 - 102)  BP: 92/34 (26 Sep 2023 02:47) (85/33 - 99/48)  BP(mean): --  RR: 15 (25 Sep 2023 23:56) (15 - 16)  SpO2: 98% (26 Sep 2023 04:27) (94% - 98%)    Parameters below as of 26 Sep 2023 04:27  Patient On (Oxygen Delivery Method): ventilator          PTT - ( 25 Sep 2023 13:38 )  PTT:82.1 sec                        7.6    5.17  )-----------( 212      ( 25 Sep 2023 05:15 )             24.3     09-25    128<L>  |  93<L>  |  102<H>  ----------------------------<  200<H>  4.4   |  26  |  2.88<H>    Ca    9.3      25 Sep 2023 21:30  Phos  3.9     09-25  Mg     2.40     09-25    TPro  5.4<L>  /  Alb  1.6<L>  /  TBili  <0.2  /  DBili  x   /  AST  31  /  ALT  8   /  AlkPhos  326<H>  09-24    LIVER FUNCTIONS - ( 24 Sep 2023 06:00 )  Alb: 1.6 g/dL / Pro: 5.4 g/dL / ALK PHOS: 326 U/L / ALT: 8 U/L / AST: 31 U/L / GGT: x                                   CAPILLARY BLOOD GLUCOSE      POCT Blood Glucose.: 226 mg/dL (26 Sep 2023 05:22)  POCT Blood Glucose.: 222 mg/dL (25 Sep 2023 23:08)  POCT Blood Glucose.: 210 mg/dL (25 Sep 2023 17:04)  POCT Blood Glucose.: 168 mg/dL (25 Sep 2023 10:58)    acetaminophen   Oral Liquid .. 650 milliGRAM(s) Oral every 6 hours PRN  acetic acid 0.25% Topical Irrigation 1 Application(s) Topical two times a day  albuterol/ipratropium for Nebulization 3 milliLiter(s) Nebulizer every 6 hours  artificial tears (preservative free) Ophthalmic Solution 1 Drop(s) Both EYES three times a day PRN  atorvastatin 40 milliGRAM(s) Oral at bedtime  buMETAnide Infusion 0.5 mG/Hr IV Continuous <Continuous>  calamine/zinc oxide Lotion 1 Application(s) Topical two times a day PRN  chlorhexidine 0.12% Liquid 15 milliLiter(s) Oral Mucosa every 12 hours  chlorhexidine 2% Cloths 1 Application(s) Topical daily  dextrose 5%. 1000 milliLiter(s) IV Continuous <Continuous>  dextrose 5%. 1000 milliLiter(s) IV Continuous <Continuous>  dextrose 50% Injectable 12.5 Gram(s) IV Push once  dextrose 50% Injectable 25 Gram(s) IV Push once  dextrose 50% Injectable 25 Gram(s) IV Push once  dextrose Oral Gel 15 Gram(s) Oral once PRN  epoetin odalis (EPOGEN) Injectable 25579 Unit(s) SubCutaneous <User Schedule>  ferrous    sulfate Liquid 300 milliGRAM(s) Enteral Tube daily  fluconAZOLE IVPB 200 milliGRAM(s) IV Intermittent every 24 hours  glucagon  Injectable 1 milliGRAM(s) IntraMuscular once  heparin  Infusion 1050 Unit(s)/Hr IV Continuous <Continuous>  insulin lispro (ADMELOG) corrective regimen sliding scale   SubCutaneous every 6 hours  insulin NPH human recombinant 3 Unit(s) SubCutaneous every 6 hours  midodrine. 10 milliGRAM(s) Oral three times a day  mupirocin 2% Ointment 1 Application(s) Topical two times a day  pantoprazole  Injectable 40 milliGRAM(s) IV Push two times a day  petrolatum Ophthalmic Ointment 1 Application(s) Right EYE two times a day  psyllium Powder 1 Packet(s) Oral daily  sodium chloride 2 Gram(s) Oral two times a day        Plan:  - Administer Midodrine 10 mg via Peg now    - F/u repeat BP   -   - Will continue to follow up Called by RN to evaluate pt. with hypotension reported at 0115 am as 85/33 mm Hg. Seen and evaluated the patient with daughter at bedside.  Patient's repeat BP while the writer was in the room noted to be 91/30 mm hg. Patient is at baseline mental status. Unable to obtain ROS due to mental status. No tachycardia, hypoxia, fevers noted at this time. Patient was started on bumex drip since yesterday for ADHF with volume overload          Vital Signs Last 24 Hrs  T(C): 36.9 (25 Sep 2023 23:56), Max: 37.4 (25 Sep 2023 15:53)  T(F): 98.5 (25 Sep 2023 23:56), Max: 99.4 (25 Sep 2023 15:53)  HR: 78 (26 Sep 2023 04:27) (78 - 102)  BP: 92/34 (26 Sep 2023 02:47) (85/33 - 99/48)  BP(mean): --  RR: 15 (25 Sep 2023 23:56) (15 - 16)  SpO2: 98% (26 Sep 2023 04:27) (94% - 98%)    Parameters below as of 26 Sep 2023 04:27  Patient On (Oxygen Delivery Method): ventilator          PTT - ( 25 Sep 2023 13:38 )  PTT:82.1 sec                        7.6    5.17  )-----------( 212      ( 25 Sep 2023 05:15 )             24.3     09-25    128<L>  |  93<L>  |  102<H>  ----------------------------<  200<H>  4.4   |  26  |  2.88<H>    Ca    9.3      25 Sep 2023 21:30  Phos  3.9     09-25  Mg     2.40     09-25    TPro  5.4<L>  /  Alb  1.6<L>  /  TBili  <0.2  /  DBili  x   /  AST  31  /  ALT  8   /  AlkPhos  326<H>  09-24    LIVER FUNCTIONS - ( 24 Sep 2023 06:00 )  Alb: 1.6 g/dL / Pro: 5.4 g/dL / ALK PHOS: 326 U/L / ALT: 8 U/L / AST: 31 U/L / GGT: x                                   CAPILLARY BLOOD GLUCOSE    acetaminophen   Oral Liquid .. 650 milliGRAM(s) Oral every 6 hours PRN  acetic acid 0.25% Topical Irrigation 1 Application(s) Topical two times a day  albuterol/ipratropium for Nebulization 3 milliLiter(s) Nebulizer every 6 hours  artificial tears (preservative free) Ophthalmic Solution 1 Drop(s) Both EYES three times a day PRN  atorvastatin 40 milliGRAM(s) Oral at bedtime  buMETAnide Infusion 0.5 mG/Hr IV Continuous <Continuous>  calamine/zinc oxide Lotion 1 Application(s) Topical two times a day PRN  chlorhexidine 0.12% Liquid 15 milliLiter(s) Oral Mucosa every 12 hours  chlorhexidine 2% Cloths 1 Application(s) Topical daily  dextrose 5%. 1000 milliLiter(s) IV Continuous <Continuous>  dextrose 5%. 1000 milliLiter(s) IV Continuous <Continuous>  dextrose 50% Injectable 12.5 Gram(s) IV Push once  dextrose 50% Injectable 25 Gram(s) IV Push once  dextrose 50% Injectable 25 Gram(s) IV Push once  dextrose Oral Gel 15 Gram(s) Oral once PRN  epoetin odalis (EPOGEN) Injectable 42127 Unit(s) SubCutaneous <User Schedule>  ferrous    sulfate Liquid 300 milliGRAM(s) Enteral Tube daily  fluconAZOLE IVPB 200 milliGRAM(s) IV Intermittent every 24 hours  glucagon  Injectable 1 milliGRAM(s) IntraMuscular once  heparin  Infusion 1050 Unit(s)/Hr IV Continuous <Continuous>  insulin lispro (ADMELOG) corrective regimen sliding scale   SubCutaneous every 6 hours  insulin NPH human recombinant 3 Unit(s) SubCutaneous every 6 hours  midodrine. 10 milliGRAM(s) Oral three times a day  mupirocin 2% Ointment 1 Application(s) Topical two times a day  pantoprazole  Injectable 40 milliGRAM(s) IV Push two times a day  petrolatum Ophthalmic Ointment 1 Application(s) Right EYE two times a day  psyllium Powder 1 Packet(s) Oral daily  sodium chloride 2 Gram(s) Oral two times a day        Plan:  - Administer Midodrine 10 mg via Peg now    - F/u repeat BP   - - Will continue to follow up    Addendum: F/u BP noted to be improved to 92/34 mm hg which is followed by 97/30 mm hg.    Addendum: BP repeat at 0600 am noted to be 86/30 mm hg.                      Ordered to hold Bumex drip for 2 hours . Reassess and restart as patient tolerates. Called by RN to evaluate pt. with hypotension reported at 0115 am as 85/33 mm Hg. Seen and evaluated the patient with daughter at bedside.  Patient's repeat BP while the writer was in the room noted to be 91/30 mm hg. Patient is at baseline mental status. Unable to obtain ROS due to mental status. No tachycardia, hypoxia, fevers noted at this time. Patient was started on bumex drip since yesterday for ADHF with volume overload          Vital Signs Last 24 Hrs  T(C): 36.9 (25 Sep 2023 23:56), Max: 37.4 (25 Sep 2023 15:53)  T(F): 98.5 (25 Sep 2023 23:56), Max: 99.4 (25 Sep 2023 15:53)  HR: 78 (26 Sep 2023 04:27) (78 - 102)  BP: 92/34 (26 Sep 2023 02:47) (85/33 - 99/48)  BP(mean): --  RR: 15 (25 Sep 2023 23:56) (15 - 16)  SpO2: 98% (26 Sep 2023 04:27) (94% - 98%)    Parameters below as of 26 Sep 2023 04:27  Patient On (Oxygen Delivery Method): ventilator          PTT - ( 25 Sep 2023 13:38 )  PTT:82.1 sec                        7.6    5.17  )-----------( 212      ( 25 Sep 2023 05:15 )             24.3     09-25    128<L>  |  93<L>  |  102<H>  ----------------------------<  200<H>  4.4   |  26  |  2.88<H>    Ca    9.3      25 Sep 2023 21:30  Phos  3.9     09-25  Mg     2.40     09-25    TPro  5.4<L>  /  Alb  1.6<L>  /  TBili  <0.2  /  DBili  x   /  AST  31  /  ALT  8   /  AlkPhos  326<H>  09-24    LIVER FUNCTIONS - ( 24 Sep 2023 06:00 )  Alb: 1.6 g/dL / Pro: 5.4 g/dL / ALK PHOS: 326 U/L / ALT: 8 U/L / AST: 31 U/L / GGT: x                                   CAPILLARY BLOOD GLUCOSE    acetaminophen   Oral Liquid .. 650 milliGRAM(s) Oral every 6 hours PRN  acetic acid 0.25% Topical Irrigation 1 Application(s) Topical two times a day  albuterol/ipratropium for Nebulization 3 milliLiter(s) Nebulizer every 6 hours  artificial tears (preservative free) Ophthalmic Solution 1 Drop(s) Both EYES three times a day PRN  atorvastatin 40 milliGRAM(s) Oral at bedtime  buMETAnide Infusion 0.5 mG/Hr IV Continuous <Continuous>  calamine/zinc oxide Lotion 1 Application(s) Topical two times a day PRN  chlorhexidine 0.12% Liquid 15 milliLiter(s) Oral Mucosa every 12 hours  chlorhexidine 2% Cloths 1 Application(s) Topical daily  dextrose 5%. 1000 milliLiter(s) IV Continuous <Continuous>  dextrose 5%. 1000 milliLiter(s) IV Continuous <Continuous>  dextrose 50% Injectable 12.5 Gram(s) IV Push once  dextrose 50% Injectable 25 Gram(s) IV Push once  dextrose 50% Injectable 25 Gram(s) IV Push once  dextrose Oral Gel 15 Gram(s) Oral once PRN  epoetin odalis (EPOGEN) Injectable 89964 Unit(s) SubCutaneous <User Schedule>  ferrous    sulfate Liquid 300 milliGRAM(s) Enteral Tube daily  fluconAZOLE IVPB 200 milliGRAM(s) IV Intermittent every 24 hours  glucagon  Injectable 1 milliGRAM(s) IntraMuscular once  heparin  Infusion 1050 Unit(s)/Hr IV Continuous <Continuous>  insulin lispro (ADMELOG) corrective regimen sliding scale   SubCutaneous every 6 hours  insulin NPH human recombinant 3 Unit(s) SubCutaneous every 6 hours  midodrine. 10 milliGRAM(s) Oral three times a day  mupirocin 2% Ointment 1 Application(s) Topical two times a day  pantoprazole  Injectable 40 milliGRAM(s) IV Push two times a day  petrolatum Ophthalmic Ointment 1 Application(s) Right EYE two times a day  psyllium Powder 1 Packet(s) Oral daily  sodium chloride 2 Gram(s) Oral two times a day        Plan:  - Administer Midodrine 10 mg via Peg now    - F/u repeat BP   - - Will continue to follow up    Addendum: F/u BP noted to be improved to 92/34 mm hg which is followed by 97/30 mm hg.    Addendum: BP repeat at 0600 am noted to be 86/30 mm hg.                      Ordered to hold Bumex drip for 2 hours . Reassess and restart bumex when  patient can  tolerate.                    Endorsed to day team to monitor BP and reassess Called by RN to evaluate pt. with hypotension reported at 0115 am as 85/33 mm Hg. Seen and evaluated the patient with daughter at bedside.  Patient's repeat BP while the writer was in the room noted to be 91/30 mm hg. Patient is at baseline mental status. Unable to obtain ROS due to mental status. No tachycardia, hypoxia, fevers noted at this time. Patient was started on bumex drip since yesterday for ADHF with volume overload          Vital Signs Last 24 Hrs  T(C): 36.9 (25 Sep 2023 23:56), Max: 37.4 (25 Sep 2023 15:53)  T(F): 98.5 (25 Sep 2023 23:56), Max: 99.4 (25 Sep 2023 15:53)  HR: 78 (26 Sep 2023 04:27) (78 - 102)  BP: 92/34 (26 Sep 2023 02:47) (85/33 - 99/48)  BP(mean): --  RR: 15 (25 Sep 2023 23:56) (15 - 16)  SpO2: 98% (26 Sep 2023 04:27) (94% - 98%)    Parameters below as of 26 Sep 2023 04:27  Patient On (Oxygen Delivery Method): ventilator    Physical Exam:   Constitutional: trach to vent  HEENT: tracheostomy + NGT  Respiratory: CTAB/L  Cardiovascular: S1 and S2  Gastrointestinal: BS+, soft, NT/ND  Extremities: + peripheral edema  Neurological: reduced generalized strength , patient unable to follow commands at his time which is baseline  : No [place]        PTT - ( 25 Sep 2023 13:38 )  PTT:82.1 sec                        7.6    5.17  )-----------( 212      ( 25 Sep 2023 05:15 )             24.3     09-25    128<L>  |  93<L>  |  102<H>  ----------------------------<  200<H>  4.4   |  26  |  2.88<H>    Ca    9.3      25 Sep 2023 21:30  Phos  3.9     09-25  Mg     2.40     09-25    TPro  5.4<L>  /  Alb  1.6<L>  /  TBili  <0.2  /  DBili  x   /  AST  31  /  ALT  8   /  AlkPhos  326<H>  09-24    LIVER FUNCTIONS - ( 24 Sep 2023 06:00 )  Alb: 1.6 g/dL / Pro: 5.4 g/dL / ALK PHOS: 326 U/L / ALT: 8 U/L / AST: 31 U/L / GGT: x                                   CAPILLARY BLOOD GLUCOSE    acetaminophen   Oral Liquid .. 650 milliGRAM(s) Oral every 6 hours PRN  acetic acid 0.25% Topical Irrigation 1 Application(s) Topical two times a day  albuterol/ipratropium for Nebulization 3 milliLiter(s) Nebulizer every 6 hours  artificial tears (preservative free) Ophthalmic Solution 1 Drop(s) Both EYES three times a day PRN  atorvastatin 40 milliGRAM(s) Oral at bedtime  buMETAnide Infusion 0.5 mG/Hr IV Continuous <Continuous>  calamine/zinc oxide Lotion 1 Application(s) Topical two times a day PRN  chlorhexidine 0.12% Liquid 15 milliLiter(s) Oral Mucosa every 12 hours  chlorhexidine 2% Cloths 1 Application(s) Topical daily  dextrose 5%. 1000 milliLiter(s) IV Continuous <Continuous>  dextrose 5%. 1000 milliLiter(s) IV Continuous <Continuous>  dextrose 50% Injectable 12.5 Gram(s) IV Push once  dextrose 50% Injectable 25 Gram(s) IV Push once  dextrose 50% Injectable 25 Gram(s) IV Push once  dextrose Oral Gel 15 Gram(s) Oral once PRN  epoetin odalis (EPOGEN) Injectable 80682 Unit(s) SubCutaneous <User Schedule>  ferrous    sulfate Liquid 300 milliGRAM(s) Enteral Tube daily  fluconAZOLE IVPB 200 milliGRAM(s) IV Intermittent every 24 hours  glucagon  Injectable 1 milliGRAM(s) IntraMuscular once  heparin  Infusion 1050 Unit(s)/Hr IV Continuous <Continuous>  insulin lispro (ADMELOG) corrective regimen sliding scale   SubCutaneous every 6 hours  insulin NPH human recombinant 3 Unit(s) SubCutaneous every 6 hours  midodrine. 10 milliGRAM(s) Oral three times a day  mupirocin 2% Ointment 1 Application(s) Topical two times a day  pantoprazole  Injectable 40 milliGRAM(s) IV Push two times a day  petrolatum Ophthalmic Ointment 1 Application(s) Right EYE two times a day  psyllium Powder 1 Packet(s) Oral daily  sodium chloride 2 Gram(s) Oral two times a day      Assessment:   74 y/o F well known to me from my [place] outptn practice. she was admitted at [facility] 7/12-7/22 w aspiration PNA, was treated w CEFEPIME, developed an allergic rash,  dCHF, + MAC on AFB culture, had been progressively getting more and more lethargic and dyspneic at home since DC.   In  am of 8/11/23  ptn presented with respiratory distress w hypoxia and hypercarbia requiring intubation 2/2 volume overload +/- Asp PNA    Plan:  - Administer Midodrine 10 mg via Peg now    - F/u repeat BP   - - Will continue to follow up    Addendum: F/u BP noted to be improved to 92/34 mm hg which is followed by 97/30 mm hg.    Addendum: BP repeat at 0600 am noted to be 86/30 mm hg.                      Ordered to hold Bumex drip for 2 hours . Reassess and restart bumex when  patient can  tolerate.                    Endorsed to day team to monitor BP and restart when able. Called by RN to evaluate pt. with hypotension reported at 0115 am as 85/33 mm Hg. Seen and evaluated the patient with daughter at bedside.  Patient's repeat BP while the writer was at bedside noted to be 91/30 mm hg. Patient is at baseline mental status. Unable to obtain ROS due to mental status. No tachycardia, hypoxia, fevers noted at this time. Patient was started on bumex drip since yesterday for ADHF with volume overload          Vital Signs Last 24 Hrs  T(C): 36.9 (25 Sep 2023 23:56), Max: 37.4 (25 Sep 2023 15:53)  T(F): 98.5 (25 Sep 2023 23:56), Max: 99.4 (25 Sep 2023 15:53)  HR: 78 (26 Sep 2023 04:27) (78 - 102)  BP: 92/34 (26 Sep 2023 02:47) (85/33 - 99/48)  BP(mean): --  RR: 15 (25 Sep 2023 23:56) (15 - 16)  SpO2: 98% (26 Sep 2023 04:27) (94% - 98%)    Parameters below as of 26 Sep 2023 04:27  Patient On (Oxygen Delivery Method): ventilator    Physical Exam:   Constitutional: trach to vent  HEENT: tracheostomy + NGT  Respiratory: CTAB/L  Cardiovascular: S1 and S2  Gastrointestinal: BS+, soft, NT/ND  Extremities: + peripheral edema  Neurological: reduced generalized strength , patient unable to follow commands at his time which is baseline  : No [place]        PTT - ( 25 Sep 2023 13:38 )  PTT:82.1 sec                        7.6    5.17  )-----------( 212      ( 25 Sep 2023 05:15 )             24.3     09-25    128<L>  |  93<L>  |  102<H>  ----------------------------<  200<H>  4.4   |  26  |  2.88<H>    Ca    9.3      25 Sep 2023 21:30  Phos  3.9     09-25  Mg     2.40     09-25    TPro  5.4<L>  /  Alb  1.6<L>  /  TBili  <0.2  /  DBili  x   /  AST  31  /  ALT  8   /  AlkPhos  326<H>  09-24    LIVER FUNCTIONS - ( 24 Sep 2023 06:00 )  Alb: 1.6 g/dL / Pro: 5.4 g/dL / ALK PHOS: 326 U/L / ALT: 8 U/L / AST: 31 U/L / GGT: x                                   CAPILLARY BLOOD GLUCOSE    acetaminophen   Oral Liquid .. 650 milliGRAM(s) Oral every 6 hours PRN  acetic acid 0.25% Topical Irrigation 1 Application(s) Topical two times a day  albuterol/ipratropium for Nebulization 3 milliLiter(s) Nebulizer every 6 hours  artificial tears (preservative free) Ophthalmic Solution 1 Drop(s) Both EYES three times a day PRN  atorvastatin 40 milliGRAM(s) Oral at bedtime  buMETAnide Infusion 0.5 mG/Hr IV Continuous <Continuous>  calamine/zinc oxide Lotion 1 Application(s) Topical two times a day PRN  chlorhexidine 0.12% Liquid 15 milliLiter(s) Oral Mucosa every 12 hours  chlorhexidine 2% Cloths 1 Application(s) Topical daily  dextrose 5%. 1000 milliLiter(s) IV Continuous <Continuous>  dextrose 5%. 1000 milliLiter(s) IV Continuous <Continuous>  dextrose 50% Injectable 12.5 Gram(s) IV Push once  dextrose 50% Injectable 25 Gram(s) IV Push once  dextrose 50% Injectable 25 Gram(s) IV Push once  dextrose Oral Gel 15 Gram(s) Oral once PRN  epoetin odalis (EPOGEN) Injectable 64643 Unit(s) SubCutaneous <User Schedule>  ferrous    sulfate Liquid 300 milliGRAM(s) Enteral Tube daily  fluconAZOLE IVPB 200 milliGRAM(s) IV Intermittent every 24 hours  glucagon  Injectable 1 milliGRAM(s) IntraMuscular once  heparin  Infusion 1050 Unit(s)/Hr IV Continuous <Continuous>  insulin lispro (ADMELOG) corrective regimen sliding scale   SubCutaneous every 6 hours  insulin NPH human recombinant 3 Unit(s) SubCutaneous every 6 hours  midodrine. 10 milliGRAM(s) Oral three times a day  mupirocin 2% Ointment 1 Application(s) Topical two times a day  pantoprazole  Injectable 40 milliGRAM(s) IV Push two times a day  petrolatum Ophthalmic Ointment 1 Application(s) Right EYE two times a day  psyllium Powder 1 Packet(s) Oral daily  sodium chloride 2 Gram(s) Oral two times a day      Assessment:   74 y/o F well known to me from my [facility] outpt practice. she was admitted at [facility] 7/12-7/22 w aspiration PNA, was treated w CEFEPIME, developed an allergic rash,  dCHF, + MAC on AFB culture, had been progressively getting more and more lethargic and dyspneic at home since DC.   In  am of 8/11/23  ptn presented with respiratory distress w hypoxia and hypercarbia requiring intubation 2/2 volume overload +/- Asp PNA    Plan:  - Administer Midodrine 10 mg via Peg now  at 0130  - F/u repeat BP   - - Will continue to follow up    Addendum: F/u BP noted to be improved to 92/34 mm hg which is followed by 97/30 mm hg.    Addendum: BP repeat at 0600 am noted to be 86/30 mm hg.                    RN attempted to assess BP on the lower extremity, which is edematous, documented as 68/ 45 mm hg, which could be an error value. Repeat on L lower arm is noted as 86/30 mm hg.                       Ordered to hold Bumex drip for 2 hours . Reassess and restart bumex when  patient can  tolerate.                    Endorsed to day team to monitor BP and restart when able.

## 2023-09-26 NOTE — PROGRESS NOTE ADULT - ASSESSMENT
72 y/o F well known to me from my [facility] outpt practice. she was admitted at [facility] 7/12-7/22 w aspiration PNA, was treated w CEFEPIME, developed an allergic rash,  dCHF, + MAC on AFB culture, had been progressively getting more and more lethargic and dyspneic at home since DC.   In  am of 8/11/23  ptn presented with respiratory distress w hypoxia and hypercarbia requiring intubation 2/2 volume overload +/- Asp PNA      Neuro   responds appropriately   Baseline MS AOx3, aphasic   - h/o CVA , on aspirin and statin . resumed w feeding tube, ASA resumed 8/26  - eeg  2/2 tremors, no sz focus  - less responsive in the past few days  - MRI 8/17:  new R cerebellar infarct, old left PCA/Occipital infarct. 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 Mcnulty, now in RCU  s/p septic shock overnight 9/1, now on meropenem, HDS  s/p trache, on vent support, copious secretions      Renal   Hyperkalemia with EKG changes  , initially treated w LOKELMA,  now resolved   Ptn well know to Dr. Colon, consult reviewed    HD 8/19,  8/21, 8/26 and 8/28.  s/p PUF 8/29 2.5 Liters, HD 8/30,  9/1, 9/4  started chronic HD 9/7,    on bumex drip  hyponatremic  ptn is less responsive, its possible she is uremic, unable to complete HD 9/19 2/2 bleeding at [facility],   [facility] shiley was removed 9/20, if need HD will need another shiley placed as per renal. On IV Fluconazole for fungal cx+ from O. externa DC    Hypotension  - Levo drip  prognosis is poor  consider palliative care consult    GI  NPO  on tube feeds  coffee ground emesis x 1 8/24, melena overnight 8/31, s/p 1UPRBC, EGD/Colonoscopy: erosive gastropathy, esophagisits, on colonoscopy old blood seen no active bleeding, poor prep  family to decide re PEG placement    Endocrine  T2DM   A1C 7.1 in 7/2023   - BG goal 140-200     ID   No fever/leukocytosis, recheck temp   Hx latent TB which was treated, no concern for TB   - grew MAC on AFB culture from most recent admission. at [facility]  -MSSA Bacteremia 9/1, allergic to cephalosprins and PCN, on Vanco as per ID, renal dosing,   - sputum also grew E.Coli-ESBL, as per ID recs for  Bradford through 9/7( 1 week course as per ID) , afebrile.  - 9/8:  spike  temp 38.1C, has O. externa, has a wick, recs are to get a CT to R/O an abscess/bony involvement. cont Meropenem  9/9: ptn w fever overnight to 100.8,  may need shiley pulled if bacteremic, needs NECK CT as per ENT to R/O Mastoid bone involvement while having ongoing purulent DC from L ear 2/2 O. externa, getting daily wick changes  9/10:  spiked 102 overnight through Bradford and Vanco, purulent DC from O. externa, ENT recommend Ct of mastoid. ptn in HD, has Shiley in place, consider pulling shiley and send for Cx. would d/w Renal, and consider contacting  ID, last seen by Dr. Conner 9/6 9/11: remains febrile, Dr. Conner reconsulted. cont Bradford, Vanco  9/12: tmax 100.5, fever curve is improving, awaiting Left ear CT, on Bradford since 9/1. on  vanco for MSSA Bacteremia, does not tolerate cephalosporins. through 10/2  9/13: on full vent support via trache, appears uncomfortable and onur 2/2 Left O. externa. has an incidental left middle ear tumor, no acute middle ear interventions recommended, left ear wick replaced. on Meropenem, cx from Ear DC is neg. On Vanco for MSSA bacteremia through 10/2, course of Meropenem as per ID, afebrile in the past 24 hrs . Cardura for HTN resumed, HGB dropped to 6.4, got a dose of EPO and 1UPRBC, rpt 7.8, remains on HEPARIN drip for LEFT popliteal DVT  9/14: cont on Mupirocin locally to Left ear, cont IV Bradford, ENT signed off, afebrile x 48 hrs, Mro course to be determined by ID, on Vanco for MSSA bacteremia through 10/2. ongoing worsening hyponatremia, Hypertonic saline as per renal, no HD today, s/p 1UPRBC 9/13  9/15: spiking temps again, if cont to spike as per ID re PAN scan. cont Vanco for MSSA Bacteremia  9/16-18: no temp overnight  afebrile, cont to have Left ear DC,   on Vanco and ,bradford through 10/2  On IV Fluconazole for fungal cx+ from O. externa Discharge.   fluconazole started 9/21, ptn developed an allergic rash on 9/22. doubt its 2/2 to MEROPENEM.  MEropenem was DCed 2/2 causing possible drug rash.  on VANCO based on levels for MSSA bacteremia        Heme/Onc  ppx: place on SCD  Transfuse for hgb 6.4, no obv bleed. HDS  LEFT POPLITEAL VEIN ACUTE DVT on 9/10    Ethics  GOC - Discussed GOC with daughter and , they have opted in the past for full code. and she remains full code at present

## 2023-09-26 NOTE — PROGRESS NOTE ADULT - SUBJECTIVE AND OBJECTIVE BOX
CHIEF COMPLAINT: Patient is a 73y old Female who presents with a chief complaint of Respiratory distress.      Interval Events:      REVIEW OF SYSTEMS:  [ ] All other systems negative  [ ] Unable to assess ROS because ________      Mode: AC/ CMV (Assist Control/ Continuous Mandatory Ventilation), RR (machine): 15, TV (machine): 350, FiO2: 35, PEEP: 8, ITime: 0.72, MAP: 16, PIP: 42      OBJECTIVE:  ICU Vital Signs Last 24 Hrs  T(C): 37.2 (26 Sep 2023 05:30), Max: 37.4 (25 Sep 2023 15:53)  T(F): 98.9 (26 Sep 2023 05:30), Max: 99.4 (25 Sep 2023 15:53)  HR: 101 (26 Sep 2023 06:28) (78 - 102)  BP: 86/30 (26 Sep 2023 05:54) (68/45 - 99/48)  RR: 15 (26 Sep 2023 05:30) (15 - 16)  SpO2: 96% (26 Sep 2023 06:28) (94% - 98%)    O2 Parameters below as of 26 Sep 2023 05:30  Patient On (Oxygen Delivery Method): ventilator          Mode: AC/ CMV (Assist Control/ Continuous Mandatory Ventilation), RR (machine): 15, TV (machine): 350, FiO2: 35, PEEP: 8, ITime: 0.72, MAP: 16, PIP: 42    09-25 @ 07:01  -  09-26 @ 07:00  --------------------------------------------------------  IN: 993.5 mL / OUT: 0 mL / NET: 993.5 mL      CAPILLARY BLOOD GLUCOSE      POCT Blood Glucose.: 226 mg/dL (26 Sep 2023 05:22)      HOSPITAL MEDICATIONS:  MEDICATIONS  (STANDING):  acetic acid 0.25% Topical Irrigation 1 Application(s) Topical two times a day  albuterol/ipratropium for Nebulization 3 milliLiter(s) Nebulizer every 6 hours  atorvastatin 40 milliGRAM(s) Oral at bedtime  buMETAnide Infusion 0.5 mG/Hr (2.5 mL/Hr) IV Continuous <Continuous>  chlorhexidine 0.12% Liquid 15 milliLiter(s) Oral Mucosa every 12 hours  chlorhexidine 2% Cloths 1 Application(s) Topical daily  dextrose 5%. 1000 milliLiter(s) (50 mL/Hr) IV Continuous <Continuous>  dextrose 5%. 1000 milliLiter(s) (100 mL/Hr) IV Continuous <Continuous>  dextrose 50% Injectable 12.5 Gram(s) IV Push once  dextrose 50% Injectable 25 Gram(s) IV Push once  dextrose 50% Injectable 25 Gram(s) IV Push once  epoetin odalis (EPOGEN) Injectable 75545 Unit(s) SubCutaneous <User Schedule>  ferrous    sulfate Liquid 300 milliGRAM(s) Enteral Tube daily  fluconAZOLE IVPB 200 milliGRAM(s) IV Intermittent every 24 hours  glucagon  Injectable 1 milliGRAM(s) IntraMuscular once  heparin  Infusion 1050 Unit(s)/Hr (10.5 mL/Hr) IV Continuous <Continuous>  insulin lispro (ADMELOG) corrective regimen sliding scale   SubCutaneous every 6 hours  insulin NPH human recombinant 3 Unit(s) SubCutaneous every 6 hours  midodrine. 10 milliGRAM(s) Oral three times a day  mupirocin 2% Ointment 1 Application(s) Topical two times a day  pantoprazole  Injectable 40 milliGRAM(s) IV Push two times a day  petrolatum Ophthalmic Ointment 1 Application(s) Right EYE two times a day  psyllium Powder 1 Packet(s) Oral daily  sodium chloride 2 Gram(s) Oral two times a day    MEDICATIONS  (PRN):  acetaminophen   Oral Liquid .. 650 milliGRAM(s) Oral every 6 hours PRN Temp greater or equal to 38C (100.4F), Mild Pain (1 - 3)  artificial tears (preservative free) Ophthalmic Solution 1 Drop(s) Both EYES three times a day PRN Dry Eyes  calamine/zinc oxide Lotion 1 Application(s) Topical two times a day PRN Rash and/or Itching  dextrose Oral Gel 15 Gram(s) Oral once PRN Blood Glucose LESS THAN 70 milliGRAM(s)/deciliter      LABS:                        7.6    5.17  )-----------( 212      ( 25 Sep 2023 05:15 )             24.3     09-25    128<L>  |  93<L>  |  102<H>  ----------------------------<  200<H>  4.4   |  26  |  2.88<H>    Ca    9.3      25 Sep 2023 21:30  Phos  3.9     09-25  Mg     2.40     09-25      PTT - ( 25 Sep 2023 13:38 )  PTT:82.1 sec  Urinalysis Basic - ( 25 Sep 2023 21:30 )    Color: x / Appearance: x / SG: x / pH: x  Gluc: 200 mg/dL / Ketone: x  / Bili: x / Urobili: x   Blood: x / Protein: x / Nitrite: x   Leuk Esterase: x / RBC: x / WBC x   Sq Epi: x / Non Sq Epi: x / Bacteria: x            PAST MEDICAL & SURGICAL HISTORY:  Breast CA      Diabetes      Stroke      Cardiac arrest      HTN (hypertension)      H/O mastectomy, bilateral          FAMILY HISTORY:  No pertinent family history in first degree relatives        Social History:      RADIOLOGY:  [ ] Reviewed and interpreted by me    PULMONARY FUNCTION TESTS:    EKG: CHIEF COMPLAINT: Patient is a 73y old Female who presents with a chief complaint of Respiratory distress.      Interval Events: Transferred to MICU for worsening hypotension.        Mode: AC/ CMV (Assist Control/ Continuous Mandatory Ventilation), RR (machine): 15, TV (machine): 350, FiO2: 35, PEEP: 8, ITime: 0.72, MAP: 16, PIP: 42      OBJECTIVE:  ICU Vital Signs Last 24 Hrs  T(C): 37.2 (26 Sep 2023 05:30), Max: 37.4 (25 Sep 2023 15:53)  T(F): 98.9 (26 Sep 2023 05:30), Max: 99.4 (25 Sep 2023 15:53)  HR: 101 (26 Sep 2023 06:28) (78 - 102)  BP: 86/30 (26 Sep 2023 05:54) (68/45 - 99/48)  RR: 15 (26 Sep 2023 05:30) (15 - 16)  SpO2: 96% (26 Sep 2023 06:28) (94% - 98%)    O2 Parameters below as of 26 Sep 2023 05:30  Patient On (Oxygen Delivery Method): ventilator          Mode: AC/ CMV (Assist Control/ Continuous Mandatory Ventilation), RR (machine): 15, TV (machine): 350, FiO2: 35, PEEP: 8, ITime: 0.72, MAP: 16, PIP: 42    09-25 @ 07:01  -  09-26 @ 07:00  --------------------------------------------------------  IN: 993.5 mL / OUT: 0 mL / NET: 993.5 mL      CAPILLARY BLOOD GLUCOSE      POCT Blood Glucose.: 226 mg/dL (26 Sep 2023 05:22)      HOSPITAL MEDICATIONS:  MEDICATIONS  (STANDING):  acetic acid 0.25% Topical Irrigation 1 Application(s) Topical two times a day  albuterol/ipratropium for Nebulization 3 milliLiter(s) Nebulizer every 6 hours  atorvastatin 40 milliGRAM(s) Oral at bedtime  buMETAnide Infusion 0.5 mG/Hr (2.5 mL/Hr) IV Continuous <Continuous>  chlorhexidine 0.12% Liquid 15 milliLiter(s) Oral Mucosa every 12 hours  chlorhexidine 2% Cloths 1 Application(s) Topical daily  dextrose 5%. 1000 milliLiter(s) (50 mL/Hr) IV Continuous <Continuous>  dextrose 5%. 1000 milliLiter(s) (100 mL/Hr) IV Continuous <Continuous>  dextrose 50% Injectable 12.5 Gram(s) IV Push once  dextrose 50% Injectable 25 Gram(s) IV Push once  dextrose 50% Injectable 25 Gram(s) IV Push once  epoetin odalis (EPOGEN) Injectable 19492 Unit(s) SubCutaneous <User Schedule>  ferrous    sulfate Liquid 300 milliGRAM(s) Enteral Tube daily  fluconAZOLE IVPB 200 milliGRAM(s) IV Intermittent every 24 hours  glucagon  Injectable 1 milliGRAM(s) IntraMuscular once  heparin  Infusion 1050 Unit(s)/Hr (10.5 mL/Hr) IV Continuous <Continuous>  insulin lispro (ADMELOG) corrective regimen sliding scale   SubCutaneous every 6 hours  insulin NPH human recombinant 3 Unit(s) SubCutaneous every 6 hours  midodrine. 10 milliGRAM(s) Oral three times a day  mupirocin 2% Ointment 1 Application(s) Topical two times a day  pantoprazole  Injectable 40 milliGRAM(s) IV Push two times a day  petrolatum Ophthalmic Ointment 1 Application(s) Right EYE two times a day  psyllium Powder 1 Packet(s) Oral daily  sodium chloride 2 Gram(s) Oral two times a day    MEDICATIONS  (PRN):  acetaminophen   Oral Liquid .. 650 milliGRAM(s) Oral every 6 hours PRN Temp greater or equal to 38C (100.4F), Mild Pain (1 - 3)  artificial tears (preservative free) Ophthalmic Solution 1 Drop(s) Both EYES three times a day PRN Dry Eyes  calamine/zinc oxide Lotion 1 Application(s) Topical two times a day PRN Rash and/or Itching  dextrose Oral Gel 15 Gram(s) Oral once PRN Blood Glucose LESS THAN 70 milliGRAM(s)/deciliter      LABS:                        7.6    5.17  )-----------( 212      ( 25 Sep 2023 05:15 )             24.3     09-25    128<L>  |  93<L>  |  102<H>  ----------------------------<  200<H>  4.4   |  26  |  2.88<H>    Ca    9.3      25 Sep 2023 21:30  Phos  3.9     09-25  Mg     2.40     09-25      PTT - ( 25 Sep 2023 13:38 )  PTT:82.1 sec  Urinalysis Basic - ( 25 Sep 2023 21:30 )    Color: x / Appearance: x / SG: x / pH: x  Gluc: 200 mg/dL / Ketone: x  / Bili: x / Urobili: x   Blood: x / Protein: x / Nitrite: x   Leuk Esterase: x / RBC: x / WBC x   Sq Epi: x / Non Sq Epi: x / Bacteria: x            PAST MEDICAL & SURGICAL HISTORY:  Breast CA      Diabetes      Stroke      Cardiac arrest      HTN (hypertension)      H/O mastectomy, bilateral          FAMILY HISTORY:  No pertinent family history in first degree relatives        Social History:      RADIOLOGY:  [ ] Reviewed and interpreted by me    PULMONARY FUNCTION TESTS:    EKG:

## 2023-09-26 NOTE — PROGRESS NOTE ADULT - ASSESSMENT
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 While in MICU patient noted with worsening renal failure requiring HD initiation, CGE/ Melena s/p EGD 8/31 found with esophagitis and erosive gastropathy, new right cerebellar infarct and fevers second to ESBL COLI and MSSA VAP, MSSA bacteremia, left ear otitis externa and drug rxn to cephalosporins Course further complicated by prolonged vent time s/p tracheostomy 9/1 and transferred to RCU 9/3 completed by recurrent fevers with high PIP, respiratory acidosis and concern for volume overloaded.     NEUROLOGY  # Metabolic Encephalopath vs NEW cerebella infarct   - Baseline MS AOX3 with short term memory changes per family however noted with concern for poor mentation in ICU  - MRI (8/17) with NEW right cerebellar infarct and old left PCA/occipital infarcts  - STEPHANIE (8/17) with AV showing two linear mobile echogenic elements attached to valve leaflets consistent with Lambel's excrescence, but no TRINITY thrombus, and lambel's excrescence with uncertain clinical implication.   - EEG (8/11-8/15) with no seizures   - ILR interrogation (9/7) with SR and PACs falsely recorded as AF.   - Attempted to resume ASA for medical management but held given GIB   - Continue on Lipitor for medical management   - Course complicated by questionable head and body shaking 9/8 however prolactin elevated and shakes head yes/no to some questions.   - Lethargy noted - Pending Rpt CT Head    CARDIOVASCULAR  # HTN Urgency   # Septic vs vasoplegic shock   - Labile BPs ranging in between SBP 80s-200s and attempted labetalol, however noted with hypotension and bradycardia likely from BB toxicity vs shock state?    - Holding Labetalol and Catapres   - c/w Cardura for now   - Hypotensive in the 80s systolic overnight, requiring Midodrine 20mg x 1 (resolved)   - Stable today, still mildly hypotensive, but no intervention at this time     # Hx of HFpEF with likely ADHF with volume overload.   - ECHO (7/2023) with EF 59 with severe LVH and diastolic dysfunction   - STEPHANIE (8/18) with normal LVRVSF however noted with increased LA pressures and persistent LA septal bowing into RA.   - ECHO (8/11) with EF 60 with mild LVH, normal LVRVSF, and mild ddfxn.  - Remove volume with HD sessions and intermittent bumex pushes as BP allows    - s/p Bumex 2mg IV BID for now  - Will start Bumex gtt at 0.5mg/hr - Monitor UOP and electrolytes    HEENT   # Left ear OE   - ENT evaluation (9/7) with no mastoid tenderness, however found with positive swelling and excoriation to left auricle and tenderness to manipulation of auricle. Debrided crusting of auricle and EAC evaluated with edema and unable to visualize TM. EAC debrided and found with watery exudate.   - s/p CiproHC (9/5 - 9/16)   - Bradford discontinued. Rash on torso concerning for possible drug rash  - ENT following and ear wick change QD   - Wick out but needs ? Debridement wound care called/fu ent   - ENT recommending CT IAC w/ IV con but family is refusing as concerned given CKD, kidneys wouldn't recover from IV contrast. Spoke with Nephrology, ENT, and patient's  at length. Planned for CT non con and per , decision will be made from there but for now doesn't want to risk further harming kidneys.  - CT IAC showing acute on chronic left-sided otitis externa and acute on chronic left-sided otomastoiditis. Superimposed left-sided tympanosclerosis. Superimposed cholesteatoma formation within the left tympanomastoid cavity cannot be excluded. No evidence of malignant otitis externa.  - ENT consulted (9/20) for white purulent discharge from left ear, recc: ENT:  acetic acid drops BID to left ear. Mupirocin ointment to the left pinna BID, cover with xeroform after placing mupirocin ointment. Pressure offloading of the left ear.  + L eye redness in setting of surrounding infectious process - Ophthalmology consulted via team, recs pending     # Oral Lesions with questionable Zoster vs Trauma?   - Patient with tongue pain and seen with anterior ulcerations consolidating and crusting and posterior ulceration.  - Case discussed with pathology resident and culture/ cytology sent.   - HSV negative and Path (9/6) with normal appearing epithelial cells singly and in clusters devoid of viral cytopathic effect.   - Continue on magic mouth wash for pain    RESPIRATORY  # AHHRF second to volume overload   - Hx of CKD and HFpEF presented with SOB and hypercarbia second to volume overload requiring intubation and HD initiation   - Vent weaning attempted however limited requiring tracheostomy (9/1) with IP   - Course complicated by PIPs elevated (50s) and respiratory acidosis second to secretions vs volume ?    - Vent adjusted 20/300/5/30 without improvement.   - POCUS (9/8) with BL pleural effusions (large on right and small on left)   - CT CHEST (9/8) with TBM, BL pleural effusions and continued consolidations   - Continue on vent and given TBM increased PEEP (9/9) to 20/300/8/40 with overall improvement   - Continue on Duoneb and HTS for airway clearance   - Continue volume removal with diuresis and aggressive UF sessions.   - Discussing possible thora but appears improved and will monitor.  ]  - Bedside US showing decrease in pleural effusion - hold off thoracentesis 9/10     GI  # UGIB   - CGE x 1 episode on 8/24 and melena x 2 episodes on 8/31 likely residual blood.   - EGD (8/31) found with esophagitis and erosive gastropathy  - Continue on PPI BID through late October and then PPI QD   - No melena     # Dysphagia   - NGT-TF continued   - Pending PEG however needs improvement in infectious status prior to placement.     RENAL  # Progressive CKD   - Presented volume overload and progressive RUSS on CKD (baseline CRE in 2s and mode into the 3s on admission) likely obstruction vs low flow state with shock vs AIN from drug reaction issue?  - POCUS with no signs of hydronephrosis   - Pressor support started with improvement in renal function  - Attempted steroid course in ICU without improvement in renal function   - Case discussed at length with renal and initiated on HD in ICU and now continued on HD TTHS, however remains volume overloaded.   - Nephro following - Plan to resume HD TIW - Nephro recc holding HD alissa (9/21)   - Monitor renal function and UOP, and electrolytes for now  - Monitor urine output - Will perform bladder scan/straight cath as needed if retaining urine   - Nephro recs to start on Bumex gtt - Will monitor BMP closely     Hyponatremia (improving) Na 123>>125  - c/w Salt tabs - s/p Hypertonic 1.5%NS @ 50cc/hr x 5 hr  - Renal- no plan for HD at this time - Plan to start on Bumex gtt    # Hyperphosphatemia (resolved)   - PTH normal and elevated phos likely from renal failure  - s/p Phos binder with Renvela - now d'beth as level wnl      INFECTIOUS DISEASE  # ESBL ECOLI and MSSA VAP c/b MSSA bacteremia   - RVP/ COVID (8/11) negative   - SCx (8/11) with MSSA s/p cefepime (8/12-8/13) and then ancef (8/14) however noted with drug reaction and then changed to vanco and aztreonam (8/12-8/13) in ICU .   - Course complicated by recurrent fevers and return of MSSA with bacteremia.   - SCx (9/1) with MSSA and ESBL ECOLI   - BAL (9/1) with MSSA   - BCx (9/1) with MSSA and cleared on (9/4)   - ECHO (9/6) unable to rule out endocarditis   - Continue on Bradford (9/4 - ) and Vanco BY DOSE POST HD (9/4, 9/7, 9/9 ...) with duration TBD at this time.   - Given inability to rule out endocarditis will discuss possible 4 wks with ID?   - Course complicated by fever (9/7) and UA with leuks but no bacteria, however compared to prior improved, RVP/COVID and SCx negative, and RPT CXR similar to prior   - LE DOPPLERS- Blood and sputum cx NTD; Acute left deep vein thrombosis of the left popliteal vein.  - Febrile overnight 102 recultured and sent off   -Pt receiving vanco post HD however today given vanco 750mg x 1 will check Vanco level at end of HD session and dose   - BCx (9/20): sent- NTD  - ID recc CT C/A/P w/ IV contrast - on hold d/t unsure if pt tolerate HD alissa  - C/w fluconazole 200 qd (9/21)    # Left ear OE   - ENT evaluation (9/7) with no mastoid tenderness, however found with positive swelling and excoriation to left auricle and tenderness to manipulation of auricle. Debrided crusting of auricle and EAC evaluated with edema and unable to visualize TM. EAC debrided and found with watery exudate.   - s/p CiproHC (9/5 - 9/16)     # MRSA PCRs   - MRSA PCR (8/11 and 8/14) negative   - Next MRSA + PCR ordered for 10/8     HEME  # Anemia second to GIB vs renal disease   - Hold chemical DVT PPX given bleeding/ waxing and waning HH   - s/p multiple units of PRBCs (8/15, 8/21, 8/28, 8/31 and 9/8)   - Anemia panel with AOCD but with low %sat and ferrous sulfate added to optimize   - Monitor HH and discuss with renal for EPO sometime next week.   - Spoke with GI for clearance to start Heparin gtt for + DVT   - c/w Heparin gtt (9/21)    Vascular  # DVT  - Pt with + popliteal DVT on SCD Spoke with GI no absolute contraindication for starting Heparin - advise close monitoring for bleeding - Do stat CTA if bleeding occurs and call IR   - Pt specific heparin gtt started with goal PTT 60-85  - will monitor closely   - c/w Heparin gtt (9/21)    ONC   # Hx of Breast CA   - Patient dx in 2018 and s/p BL mastectomy (radical on right) and chemo and RT.   - Supportive care.     ENDOCRINE  # IDDM2   - Continue on NPH 3U and ISS   - Monitor FS and adjust as needed     LINES/ TUBES  - R IJ CLAUDIA (8/19 - 9/21)     SKIN  # Drug Eruption   - Attempted cefepime (8/12) vs ancef (8/13-8/14) and then noted with drug rxn.   - Hold further cephalosporins at this time   - s/p Steroids with prednisone 40 (8/18 - 9/2) then prednisone 30 (9/3-9/7), then prednisone 20 (9/8 - 9/10), then prednisone 10mg (9/11-9/13) then turn off.     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 While in MICU patient noted with worsening renal failure requiring HD initiation, CGE/ Melena s/p EGD 8/31 found with esophagitis and erosive gastropathy, new right cerebellar infarct and fevers second to ESBL COLI and MSSA VAP, MSSA bacteremia, left ear otitis externa and drug rxn to cephalosporins Course further complicated by prolonged vent time s/p tracheostomy 9/1 and transferred to RCU 9/3 completed by recurrent fevers with high PIP, respiratory acidosis and concern for volume overloaded.     NEUROLOGY  # Metabolic Encephalopath vs NEW cerebella infarct   - Baseline MS AOX3 with short term memory changes per family however noted with concern for poor mentation in ICU  - MRI (8/17) with NEW right cerebellar infarct and old left PCA/occipital infarcts  - STEPHANIE (8/17) with AV showing two linear mobile echogenic elements attached to valve leaflets consistent with Lambel's excrescence, but no TRINITY thrombus, and lambel's excrescence with uncertain clinical implication.   - EEG (8/11-8/15) with no seizures   - ILR interrogation (9/7) with SR and PACs falsely recorded as AF.   - Attempted to resume ASA for medical management but held given GIB   - Continue on Lipitor for medical management   - Course complicated by questionable head and body shaking 9/8 however prolactin elevated and shakes head yes/no to some questions.   - Lethargy noted - Pending Rpt CT Head    CARDIOVASCULAR  # HTN Urgency   # Septic vs vasoplegic shock   - Labile BPs ranging in between SBP 80s-200s and attempted labetalol, however noted with hypotension and bradycardia likely from BB toxicity vs shock state?    - Holding Labetalol and Catapres   - c/w Cardura for now   - Hypotensive in the 80s systolic overnight, requiring Midodrine 20mg x 1 (resolved)   - Stable today, still mildly hypotensive, but no intervention at this time     # Hx of HFpEF with likely ADHF with volume overload.   - ECHO (7/2023) with EF 59 with severe LVH and diastolic dysfunction   - STEPHANIE (8/18) with normal LVRVSF however noted with increased LA pressures and persistent LA septal bowing into RA.   - ECHO (8/11) with EF 60 with mild LVH, normal LVRVSF, and mild ddfxn.  - Remove volume with HD sessions and intermittent bumex pushes as BP allows    - s/p Bumex 2mg IV BID for now  - Will start Bumex gtt at 0.5mg/hr - Monitor UOP and electrolytes    HEENT   # Left ear OE   - ENT evaluation (9/7) with no mastoid tenderness, however found with positive swelling and excoriation to left auricle and tenderness to manipulation of auricle. Debrided crusting of auricle and EAC evaluated with edema and unable to visualize TM. EAC debrided and found with watery exudate.   - s/p CiproHC (9/5 - 9/16)   - Bradford discontinued. Rash on torso concerning for possible drug rash  - ENT following and ear wick change QD   - Wick out but needs ? Debridement wound care called/fu ent   - ENT recommending CT IAC w/ IV con but family is refusing as concerned given CKD, kidneys wouldn't recover from IV contrast. Spoke with Nephrology, ENT, and patient's  at length. Planned for CT non con and per , decision will be made from there but for now doesn't want to risk further harming kidneys.  - CT IAC showing acute on chronic left-sided otitis externa and acute on chronic left-sided otomastoiditis. Superimposed left-sided tympanosclerosis. Superimposed cholesteatoma formation within the left tympanomastoid cavity cannot be excluded. No evidence of malignant otitis externa.  - ENT consulted (9/20) for white purulent discharge from left ear, recc: ENT:  acetic acid drops BID to left ear. Mupirocin ointment to the left pinna BID, cover with xeroform after placing mupirocin ointment. Pressure offloading of the left ear.  + L eye redness in setting of surrounding infectious process - Ophthalmology consulted via team, recs pending     # Oral Lesions with questionable Zoster vs Trauma?   - Patient with tongue pain and seen with anterior ulcerations consolidating and crusting and posterior ulceration.  - Case discussed with pathology resident and culture/ cytology sent.   - HSV negative and Path (9/6) with normal appearing epithelial cells singly and in clusters devoid of viral cytopathic effect.   - Continue on magic mouth wash for pain    RESPIRATORY  # AHHRF second to volume overload   - Hx of CKD and HFpEF presented with SOB and hypercarbia second to volume overload requiring intubation and HD initiation   - Vent weaning attempted however limited requiring tracheostomy (9/1) with IP   - Course complicated by PIPs elevated (50s) and respiratory acidosis second to secretions vs volume ?    - Vent adjusted 20/300/5/30 without improvement.   - POCUS (9/8) with BL pleural effusions (large on right and small on left)   - CT CHEST (9/8) with TBM, BL pleural effusions and continued consolidations   - Continue on vent and given TBM increased PEEP (9/9) to 20/300/8/40 with overall improvement   - Continue on Duoneb and HTS for airway clearance   - Continue volume removal with diuresis and aggressive UF sessions.   - Discussing possible thora but appears improved and will monitor.  ]  - Bedside US showing decrease in pleural effusion - hold off thoracentesis 9/10     GI  # UGIB   - CGE x 1 episode on 8/24 and melena x 2 episodes on 8/31 likely residual blood.   - EGD (8/31) found with esophagitis and erosive gastropathy  - Continue on PPI BID through late October and then PPI QD   - No melena     # Dysphagia   - NGT-TF continued   - Pending PEG however needs improvement in infectious status prior to placement.     RENAL  # Progressive CKD   - Presented volume overload and progressive RUSS on CKD (baseline CRE in 2s and mode into the 3s on admission) likely obstruction vs low flow state with shock vs AIN from drug reaction issue?  - POCUS with no signs of hydronephrosis   - Pressor support started with improvement in renal function  - Attempted steroid course in ICU without improvement in renal function   - Case discussed at length with renal and initiated on HD in ICU and now continued on HD TTHS, however remains volume overloaded.   - Nephro following - Plan to resume HD TIW - Nephro recc holding HD alissa (9/21)   - Monitor renal function and UOP, and electrolytes for now  - Monitor urine output - Will perform bladder scan/straight cath as needed if retaining urine   - Nephro recs to start on Bumex gtt - Will monitor BMP closely     Hyponatremia (improving) Na 123>>125  - c/w Salt tabs - s/p Hypertonic 1.5%NS @ 50cc/hr x 5 hr  - Renal- no plan for HD at this time - Plan to start on Bumex gtt    # Hyperphosphatemia (resolved)   - PTH normal and elevated phos likely from renal failure  - s/p Phos binder with Renvela - now d'beth as level wnl      INFECTIOUS DISEASE  # ESBL ECOLI and MSSA VAP c/b MSSA bacteremia   - RVP/ COVID (8/11) negative   - SCx (8/11) with MSSA s/p cefepime (8/12-8/13) and then ancef (8/14) however noted with drug reaction and then changed to vanco and aztreonam (8/12-8/13) in ICU .   - Course complicated by recurrent fevers and return of MSSA with bacteremia.   - SCx (9/1) with MSSA and ESBL ECOLI   - BAL (9/1) with MSSA   - BCx (9/1) with MSSA and cleared on (9/4)   - ECHO (9/6) unable to rule out endocarditis   - Continue on Bradford (9/4 - ) and Vanco BY DOSE POST HD (9/4, 9/7, 9/9 ...) with duration TBD at this time.   - Given inability to rule out endocarditis will discuss possible 4 wks with ID?   - Course complicated by fever (9/7) and UA with leuks but no bacteria, however compared to prior improved, RVP/COVID and SCx negative, and RPT CXR similar to prior   - LE DOPPLERS- Blood and sputum cx NTD; Acute left deep vein thrombosis of the left popliteal vein.  - Febrile overnight 102 recultured and sent off   -Pt receiving vanco post HD however today given vanco 750mg x 1 will check Vanco level at end of HD session and dose   - BCx (9/20): sent- NTD  - ID recc CT C/A/P w/ IV contrast - on hold d/t unsure if pt tolerate HD alissa  - C/w fluconazole 200 qd (9/21)    # Left ear OE   - ENT evaluation (9/7) with no mastoid tenderness, however found with positive swelling and excoriation to left auricle and tenderness to manipulation of auricle. Debrided crusting of auricle and EAC evaluated with edema and unable to visualize TM. EAC debrided and found with watery exudate.   - s/p CiproHC (9/5 - 9/16)     # MRSA PCRs   - MRSA PCR (8/11 and 8/14) negative   - Next MRSA + PCR ordered for 10/8     HEME  # Anemia second to GIB vs renal disease   - Hold chemical DVT PPX given bleeding/ waxing and waning HH   - s/p multiple units of PRBCs (8/15, 8/21, 8/28, 8/31 and 9/8)   - Anemia panel with AOCD but with low %sat and ferrous sulfate added to optimize   - Monitor HH and discuss with renal for EPO sometime next week.   - Spoke with GI for clearance to start Heparin gtt for + DVT   - c/w Heparin gtt (9/21)    Vascular  # DVT  - Pt with + popliteal DVT on SCD Spoke with GI no absolute contraindication for starting Heparin - advise close monitoring for bleeding - Do stat CTA if bleeding occurs and call IR   - Pt specific heparin gtt started with goal PTT 60-85  - will monitor closely   - c/w Heparin gtt (9/21)    ONC   # Hx of Breast CA   - Patient dx in 2018 and s/p BL mastectomy (radical on right) and chemo and RT.   - Supportive care.     ENDOCRINE  # IDDM2   - Continue on NPH 3U and ISS   - Monitor FS and adjust as needed     LINES/ TUBES  - R IJ CLAUDIA (8/19 - 9/21)     SKIN  # Drug Eruption   - Attempted cefepime (8/12) vs ancef (8/13-8/14) and then noted with drug rxn.   - Hold further cephalosporins at this time   - s/p Steroids with prednisone 40 (8/18 - 9/2) then prednisone 30 (9/3-9/7), then prednisone 20 (9/8 - 9/10), then prednisone 10mg (9/11-9/13) then turn off.     ETHICS/ GOC    - FULL CODE     TRANSFERRED TO MICU For Hypotension

## 2023-09-26 NOTE — CONSULT NOTE ADULT - SUBJECTIVE AND OBJECTIVE BOX
[facility] DEPARTMENT OF OPHTHALMOLOGY - INITIAL ADULT CONSULT  -----------------------------------------------------------------------------  Latricia France MD, PGY-3  Contact: TEAMS  -----------------------------------------------------------------------------    HPI:  74 y/o F DM on insulin, HTN, CKD,breast CA, respiratory arrest and cardiac arrest (2018), CVA with residual weakness, aspiration PNA h/o trache, PEG, both removed presents with respiratory distress requiring intubation. Had recent admission d/c 7/22/23 for cough and respiratory distress (no intubation) thought to be i/s/o aspiration PNA s/p cefepime course; developed rash in response. Infectious w/u was negative at this time. Was discharged home, daughter and  at bedside providing history.     Reports that she was initially improving with O2 sats in the high 90s on room air, however, then became more lethargic and not herself (baseline mental status AOx3). Also had worsening shortness of breath while laying down and leg swelling. Contacted cardiologist who started her on bumex 1mg daily. Developed low sugars overnight before admission and was given juice with some food, daughter reports no coughing during episode. At around 4-5 AM developed vomiting and coughing, O2 sats dropped to 80s and EMS was called. Denies fevers/chills, dysuria or urinary frequency, chest pain, palpitations. Uses wheelchair at home however family moves her around frequently.     In ED, was on BiPAP with increased WOB and was intubated. Found to have elevated pro-BNP and CO2 of 111 on VBG. CXR with small right pleural effusion, started on vanc in the ED.  (11 Aug 2023 09:08)    Interval History: ***    PMH: ***  POcHx: denies surg/laser  FH: denies glc/amd  Social History: denies etoh/tobacco  Ophthalmic Medications: none  Allergies: NKDA    Review of Systems:  Constitutional: No fever, chills  Eyes: No blurry vision, flashes, floaters, FBS, erythema, discharge, double vision, OU  Neuro: No tremors  Cardiovascular: No chest pain, palpitations  Respiratory: No SOB, no cough  GI: No nausea, vomiting, abdominal pain  : No dysuria  Skin: no rash  Psych: no depression  Endocrine: no polyuria, polydipsia  Heme/lymph: no swelling    VITALS: T(C): 37.2 (09-26-23 @ 05:30)  T(F): 98.9 (09-26-23 @ 05:30), Max: 99.4 (09-25-23 @ 15:53)  HR: 101 (09-26-23 @ 06:28) (78 - 102)  BP: 86/30 (09-26-23 @ 05:54) (68/45 - 99/48)  RR:  (15 - 16)  SpO2:  (94% - 98%)  Wt(kg): --  General: AAO x 3, appropriate mood and affect    Ophthalmology Exam:  Visual acuity (sc): 20/20 OD, 20/20 OS  Pupils: PERRL OU, no RAPD  Ttono: 16 OD 16 OS  Extraocular movements (EOMs): Full OU, no pain, no diplopia  Confrontational Visual Field (CVF): Full OD, Full OS  Color Plates: 12/12 OD, 12/12 OS    Pen Light Exam (PLE)  External: Flat OU  Lids/Lashes/Lacrimal Ducts: Flat OU    Sclera/Conjunctiva: W+Q OU  Cornea: Cl OU  Anterior Chamber: D+F OU    Iris: Flat OU  Lens: Cl OU    Fundus Exam: dilated with 1% tropicamide and 2.5% phenylephrine  Approval obtained from primary team for dilation  Patient aware that pupils can remained dilated for at least 4-6 hours  Exam performed with 20D lens    Vitreous: wnl OU  Disc, cup/disc: sharp and pink, 0.4 OU  Macula: Flat OU  Vessels: Normal caliber OU  Periphery: flat OU    Labs/Imaging:  *** [facility] DEPARTMENT OF OPHTHALMOLOGY - INITIAL ADULT CONSULT  -----------------------------------------------------------------------------  Latricia France MD, PGY-3  Contact: TEAMS  -----------------------------------------------------------------------------    HPI:  72 y/o F DM on insulin, HTN, CKD,breast CA, respiratory arrest and cardiac arrest (2018), CVA with residual weakness, aspiration PNA h/o trache, PEG, both removed presents with respiratory distress requiring intubation. Had recent admission d/c 7/22/23 for cough and respiratory distress (no intubation) thought to be i/s/o aspiration PNA s/p cefepime course; developed rash in response. Infectious w/u was negative at this time. Was discharged home, daughter and  at bedside providing history.     Reports that she was initially improving with O2 sats in the high 90s on room air, however, then became more lethargic and not herself (baseline mental status AOx3). Also had worsening shortness of breath while laying down and leg swelling. Contacted cardiologist who started her on bumex 1mg daily. Developed low sugars overnight before admission and was given juice with some food, daughter reports no coughing during episode. At around 4-5 AM developed vomiting and coughing, O2 sats dropped to 80s and EMS was called. Denies fevers/chills, dysuria or urinary frequency, chest pain, palpitations. Uses wheelchair at home however family moves her around frequently.     In ED, was on BiPAP with increased WOB and was intubated. Found to have elevated pro-BNP and CO2 of 111 on VBG. CXR with small right pleural effusion, started on vanc in the ED.  (11 Aug 2023 09:08)    Interval History: Patient's family noticed left eye becoming more red over the past few days. Family reports patient sees Dr. Hu, and gets injection of anti-VEGF medication in the right eye. Last injection about one year ago per family.     PMH: As above  POcHx: denies surg/laser  FH: denies glc/amd  Social History: denies etoh/tobacco  Ophthalmic Medications: none  Allergies: NKDA    Review of Systems:  Unable to obtain ROS as patient is not responsive to questions.     VITALS: T(C): 37.2 (09-26-23 @ 05:30)  T(F): 98.9 (09-26-23 @ 05:30), Max: 99.4 (09-25-23 @ 15:53)  HR: 101 (09-26-23 @ 06:28) (78 - 102)  BP: 86/30 (09-26-23 @ 05:54) (68/45 - 99/48)  RR:  (15 - 16)  SpO2:  (94% - 98%)  Wt(kg): --  General: AAO x 0    Ophthalmology Exam:  Visual acuity (sc): KENDY 2/2 mental status   Pupils: PERRL OU, no RAPD  Ttono: 7 OD 4 OS  Extraocular movements (EOMs): KENDY 2/2 mental status  Confrontational Visual Field (CVF): KENDY 2/2 mental status   Color Plates: KENDY 2/2 mental status and trach    Pen Light Exam (PLE)  External: Flat OU  Lids/Lashes/Lacrimal Ducts: Flat OU    Sclera/Conjunctiva: W+Q OD. Conjunctivochalasis OU. Subconjunctival hemorrhage and injection OS. 1+ chemosis OS.   Cornea: Cl OD. D-folds diffuse OS  Anterior Chamber: D+F OU. No hypopyon OS.   Iris: Flat OU  Lens: Cl OU    Fundus Exam: dilated with 1% tropicamide and 2.5% phenylephrine  Approval obtained from primary team for dilation  Patient aware that pupils can remained dilated for at least 4-6 hours  Exam performed with 20D lens    Vitreous: wnl OU  Disc, cup/disc: sharp and pink, 0.6 OU  Macula: Flat OU  Vessels: Normal caliber OU  Periphery: Diabetic retinopathy OU.    Labs/Imaging:  No ophtho imaging.    MSSA in blood culture on 9/1/23, blood cultures now clear.

## 2023-09-26 NOTE — PROGRESS NOTE ADULT - SUBJECTIVE AND OBJECTIVE BOX
NEPHROLOGY     Patient seen and examined trach to paula family at bedside, pt in no acute distress.     MEDICATIONS  (STANDING):  acetic acid 0.25% Topical Irrigation 1 Application(s) Topical two times a day  albuterol/ipratropium for Nebulization 3 milliLiter(s) Nebulizer every 6 hours  atorvastatin 40 milliGRAM(s) Oral at bedtime  chlorhexidine 0.12% Liquid 15 milliLiter(s) Oral Mucosa every 12 hours  chlorhexidine 2% Cloths 1 Application(s) Topical daily  dextrose 5%. 1000 milliLiter(s) (50 mL/Hr) IV Continuous <Continuous>  dextrose 5%. 1000 milliLiter(s) (100 mL/Hr) IV Continuous <Continuous>  dextrose 50% Injectable 12.5 Gram(s) IV Push once  dextrose 50% Injectable 25 Gram(s) IV Push once  dextrose 50% Injectable 25 Gram(s) IV Push once  epoetin odalis (EPOGEN) Injectable 77025 Unit(s) SubCutaneous <User Schedule>  ferrous    sulfate Liquid 300 milliGRAM(s) Enteral Tube daily  fluconAZOLE IVPB 200 milliGRAM(s) IV Intermittent every 24 hours  glucagon  Injectable 1 milliGRAM(s) IntraMuscular once  heparin  Infusion 1050 Unit(s)/Hr (10.5 mL/Hr) IV Continuous <Continuous>  insulin lispro (ADMELOG) corrective regimen sliding scale   SubCutaneous every 6 hours  insulin NPH human recombinant 3 Unit(s) SubCutaneous every 6 hours  midodrine. 20 milliGRAM(s) Oral three times a day  mupirocin 2% Ointment 1 Application(s) Topical two times a day  pantoprazole  Injectable 40 milliGRAM(s) IV Push two times a day  petrolatum Ophthalmic Ointment 1 Application(s) Right EYE two times a day  psyllium Powder 1 Packet(s) Oral daily  sodium chloride 2 Gram(s) Oral two times a day    VITALS:  T(C): , Max: 37.6 (09-26-23 @ 12:00)  T(F): , Max: 99.7 (09-26-23 @ 12:00)  HR: 96 (09-26-23 @ 12:00)  BP: 86/28 (09-26-23 @ 08:00)  BP(mean): 41 (09-26-23 @ 08:00)  RR: 16 (09-26-23 @ 12:00)  SpO2: 96% (09-26-23 @ 12:00)    I and O's:    09-25 @ 07:01  -  09-26 @ 07:00  --------------------------------------------------------  IN: 993.5 mL / OUT: 0 mL / NET: 993.5 mL    09-26 @ 07:01  -  09-26 @ 11:27  --------------------------------------------------------  IN: 227.5 mL / OUT: 250 mL / NET: -22.5 mL    PHYSICAL EXAM:    Constitutional: trach to vent  HEENT: tracheostomy + NGT  Respiratory: CTAB/L  Cardiovascular: S1 and S2  Gastrointestinal: BS+, soft, NT/ND  Extremities: + peripheral edema  Neurological: reduced generalized strength   : No Wheeler  Skin: No rashes    LABS:                        7.7    7.19  )-----------( 259      ( 26 Sep 2023 08:48 )             24.2     09-26    128<L>  |  91<L>  |  109<H>  ----------------------------<  213<H>  4.5   |  25  |  2.87<H>    Ca    9.4      26 Sep 2023 06:15  Phos  4.0     09-26  Mg     2.40     09-26    Urine Studies:  Urinalysis Basic - ( 26 Sep 2023 06:15 )    Color: x / Appearance: x / SG: x / pH: x  Gluc: 213 mg/dL / Ketone: x  / Bili: x / Urobili: x   Blood: x / Protein: x / Nitrite: x   Leuk Esterase: x / RBC: x / WBC x   Sq Epi: x / Non Sq Epi: x / Bacteria: x    RADIOLOGY & ADDITIONAL STUDIES:    < from: CT Head No Cont (09.26.23 @ 09:12) >  IMPRESSION: Limited study from motion artifact. Suggestion of a new   acute/subacute left parietal lobe infarct. Consider MRI for further   evaluation.    --- End of Report ---            SURINDER TOLEDO MD; Attending Radiologist  This document has been electronically signed. Sep 26 2023  9:18AM    < end of copied text >

## 2023-09-26 NOTE — PROGRESS NOTE ADULT - SUBJECTIVE AND OBJECTIVE BOX
Follow Up:  MSSA bacteremia    Interval History: no more fever but now hypotensive, still with rash more extensive now and L eye injection, head CT also showed new CVA    ROS:    Unobtainable because: trached          Allergies  isoniazid (Rash)  nafcillin (Unknown)  hydrALAZINE (Rash)  vitamin E (Short breath; Urticaria; Hives)  doxycycline (Rash)  cefepime (Rash)  NIFEdipine (Urticaria; Hives)        ANTIMICROBIALS:  fluconAZOLE IVPB 200 every 24 hours      OTHER MEDS:  acetaminophen   Oral Liquid .. 650 milliGRAM(s) Oral every 6 hours PRN  acetic acid 0.25% Topical Irrigation 1 Application(s) Topical two times a day  albuterol/ipratropium for Nebulization 3 milliLiter(s) Nebulizer every 6 hours  artificial tears (preservative free) Ophthalmic Solution 1 Drop(s) Both EYES three times a day PRN  atorvastatin 40 milliGRAM(s) Oral at bedtime  calamine/zinc oxide Lotion 1 Application(s) Topical two times a day PRN  chlorhexidine 0.12% Liquid 15 milliLiter(s) Oral Mucosa every 12 hours  chlorhexidine 2% Cloths 1 Application(s) Topical daily  dextrose 5%. 1000 milliLiter(s) IV Continuous <Continuous>  dextrose 5%. 1000 milliLiter(s) IV Continuous <Continuous>  dextrose 50% Injectable 12.5 Gram(s) IV Push once  dextrose 50% Injectable 25 Gram(s) IV Push once  dextrose 50% Injectable 25 Gram(s) IV Push once  dextrose Oral Gel 15 Gram(s) Oral once PRN  epoetin odalis (EPOGEN) Injectable 08619 Unit(s) SubCutaneous <User Schedule>  ferrous    sulfate Liquid 300 milliGRAM(s) Enteral Tube daily  glucagon  Injectable 1 milliGRAM(s) IntraMuscular once  heparin  Infusion 1050 Unit(s)/Hr IV Continuous <Continuous>  insulin lispro (ADMELOG) corrective regimen sliding scale   SubCutaneous every 6 hours  insulin NPH human recombinant 3 Unit(s) SubCutaneous every 6 hours  midodrine. 20 milliGRAM(s) Oral three times a day  mupirocin 2% Ointment 1 Application(s) Topical two times a day  pantoprazole  Injectable 40 milliGRAM(s) IV Push two times a day  petrolatum Ophthalmic Ointment 1 Application(s) Right EYE two times a day  psyllium Powder 1 Packet(s) Oral daily  sodium chloride 2 Gram(s) Oral two times a day      Vital Signs Last 24 Hrs  T(C): 37.6 (26 Sep 2023 12:00), Max: 37.6 (26 Sep 2023 12:00)  T(F): 99.7 (26 Sep 2023 12:00), Max: 99.7 (26 Sep 2023 12:00)  HR: 90 (26 Sep 2023 14:26) (78 - 101)  BP: 85/53 (26 Sep 2023 12:00) (68/45 - 99/48)  BP(mean): 59 (26 Sep 2023 12:00) (41 - 59)  RR: 16 (26 Sep 2023 12:00) (15 - 16)  SpO2: 93% (26 Sep 2023 14:26) (93% - 98%)    Parameters below as of 26 Sep 2023 12:00  Patient On (Oxygen Delivery Method): ventilator        Physical Exam:  General:    trached   ENT: L ear with crusted dark drainage  Respiratory:   trach on vent  abd:   soft, not tender  :     no CVAT, no suprapubic tenderness, no willard  Musculoskeletal : no joint swelling  Skin:    erythematous patchy rash on abd, chest, extremities   vascular: SHAKIRA odom removed                          7.7    7.19  )-----------( 259      ( 26 Sep 2023 08:48 )             24.2       09-26    128<L>  |  91<L>  |  109<H>  ----------------------------<  213<H>  4.5   |  25  |  2.87<H>    Ca    9.4      26 Sep 2023 06:15  Phos  4.0     09-26  Mg     2.40     09-26        Urinalysis Basic - ( 26 Sep 2023 06:15 )    Color: x / Appearance: x / SG: x / pH: x  Gluc: 213 mg/dL / Ketone: x  / Bili: x / Urobili: x   Blood: x / Protein: x / Nitrite: x   Leuk Esterase: x / RBC: x / WBC x   Sq Epi: x / Non Sq Epi: x / Bacteria: x        MICROBIOLOGY:  Vancomycin Level, Trough: 15.1 ug/mL (09-26-23 @ 06:15)  v  .Blood Blood-Venous  09-20-23   No growth at 5 days  --  --      .Blood Blood-Peripheral  09-20-23   No growth at 5 days  --  --      Ear Ear  09-13-23   Moderate Candida albicans "Susceptibilities not performed"  --  --      .Blood Blood-Venous  09-10-23   No growth at 5 days  --  --      .Sputum Sputum  09-10-23   Normal Respiratory Cate present  --    Rare polymorphonuclear leukocytes per low power field  No Squamous epithelial cells per low power field  Rare Yeast like cells seen per oil power field  Rare Gram Positive Cocci in Clusters seen per oil power field      .Blood Blood-Peripheral  09-10-23   No growth at 5 days  --  --      .Sputum Sputum  09-08-23   Normal Respiratory Cate present  --    Numerous polymorphonuclear leukocytes per low power field  Numerous Squamous epithelial cells per low power field  Few Yeast like cells per oil power field  Results consistent with oropharyngeal contamination      .Blood Blood-Peripheral  09-08-23   No growth at 5 days  --  --      Ear Ear  09-06-23   No growth at 5 days  --  --      .Blood Blood-Peripheral  09-04-23   No growth at 5 days  --  --      .Bronchial Bronchial Lavage  09-01-23   Numerous Staphylococcus aureus Multiple Morphological Strains  Normal Respiratory Cate present  --  Staphylococcus aureus  Staphylococcus aureus      .Blood Blood-Peripheral  09-01-23   Growth in aerobic and anaerobic bottles: Staphylococcus aureus  Direct identification is available within approximately 3-5  hours either by Blood Panel Multiplexed PCR or Direct  MALDI-TOF. Details: [email or web address]  Growth in anaerobic bottle: blood culture bottle turned positive after  the final report was released.  --  Blood Culture PCR  Staphylococcus aureus      ET Tube ET Tube  09-01-23   Moderate Staphylococcus aureus  Moderate Escherichia coli ESBL  Normal Respiratory Cate present  --  Staphylococcus aureus  Escherichia coli ESBL          Rapid RVP Result: NotDetec (09-20 @ 15:35)        RADIOLOGY:  Images independently visualized and reviewed personally, findings as below  < from: CT Head No Cont (09.26.23 @ 09:12) >    IMPRESSION: Limited study from motion artifact. Suggestion of a new   acute/subacute left parietal lobe infarct. Consider MRI for further   evaluation.    < end of copied text >  < from: Transthoracic Echocardiogram (09.06.23 @ 14:05) >  CONCLUSIONS:  Limited study to evaluate for endocarditis.  Unable to  exclude endocarditis.  Consider STEPHANIE for further evaluation,  if clinically indicated.    < end of copied text >

## 2023-09-26 NOTE — CHART NOTE - NSCHARTNOTEFT_GEN_A_CORE
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 While in MICU patient noted with worsening renal failure requiring HD initiation, CGE/ Melena s/p EGD 8/31 found with esophagitis and erosive gastropathy, new right cerebellar infarct and fevers second to ESBL COLI and MSSA VAP, MSSA bacteremia, left ear otitis externa and drug rxn to cephalopsporins. Course further complicated by prolonged vent time s/p tracheostomy 9/1 and transferred to RCU 9/3  Was found to have left otitis externa (9/5) s/p serial bedside debridement with worsening necrotic tissue/erythema for which she was also initiated on meropenem. Family refusing contrast, non-contrast study obtained with evidence of bone involvement. Pt underwent ENT debridement x2 (last 9/21). Found to have new black spores and white discoloration, concern for candida infection. Given fevers and need for debridement fluconazole added to regimen 9/21. Will also continue with meropenem, CT imaging does not show significant enough bone involvement to require prolonged course for osteomyelitis. Plan to also complete vancomycin by level x4 week course through 10/2 (given inability to exclude endocarditis).   Pt off HD since 9/20. Pt with (+) UOP on diuretics. SHAKIRA Rosado removed as no longer functioning on 9/21. Finished prednisone taper for possible AIN. Strict I/Os and daily BMPs in place. Hospital course further c/b acute hypervolemic hyponatremia and uremia in the setting of renal failure not resolving despite hypertonic saline, diuretics, and salt tabs. S/P bumetanide continuous drip x24 hrs 2/2 worsening fluid overload and uremia. Pt now hypotensive and transferred to MICU for possible pressors.      OBJECTIVE DATA:      Vital Signs Last 24 Hrs  T(C): 37.6 (26 Sep 2023 12:00), Max: 37.6 (26 Sep 2023 12:00)  T(F): 99.7 (26 Sep 2023 12:00), Max: 99.7 (26 Sep 2023 12:00)  HR: 90 (26 Sep 2023 14:26) (78 - 101)  BP: 85/53 (26 Sep 2023 12:00) (68/45 - 99/48)  BP(mean): 59 (26 Sep 2023 12:00) (41 - 59)  RR: 16 (26 Sep 2023 12:00) (15 - 16)  SpO2: 93% (26 Sep 2023 14:26) (93% - 98%)    Parameters below as of 26 Sep 2023 12:00  Patient On (Oxygen Delivery Method): ventilator      I&O's Summary    25 Sep 2023 07:01  -  26 Sep 2023 07:00  --------------------------------------------------------  IN: 993.5 mL / OUT: 0 mL / NET: 993.5 mL    26 Sep 2023 07:01  -  26 Sep 2023 15:08  --------------------------------------------------------  IN: 398.5 mL / OUT: 250 mL / NET: 148.5 mL        Allergies:      MEDICATIONS  (STANDING):  acetic acid 0.25% Topical Irrigation 1 Application(s) Topical two times a day  albuterol/ipratropium for Nebulization 3 milliLiter(s) Nebulizer every 6 hours  atorvastatin 40 milliGRAM(s) Oral at bedtime  chlorhexidine 0.12% Liquid 15 milliLiter(s) Oral Mucosa every 12 hours  chlorhexidine 2% Cloths 1 Application(s) Topical daily  dextrose 5%. 1000 milliLiter(s) (50 mL/Hr) IV Continuous <Continuous>  dextrose 5%. 1000 milliLiter(s) (100 mL/Hr) IV Continuous <Continuous>  dextrose 50% Injectable 12.5 Gram(s) IV Push once  dextrose 50% Injectable 25 Gram(s) IV Push once  dextrose 50% Injectable 25 Gram(s) IV Push once  epoetin odalis (EPOGEN) Injectable 36423 Unit(s) SubCutaneous <User Schedule>  ferrous    sulfate Liquid 300 milliGRAM(s) Enteral Tube daily  fluconAZOLE IVPB 200 milliGRAM(s) IV Intermittent every 24 hours  glucagon  Injectable 1 milliGRAM(s) IntraMuscular once  heparin  Infusion 1050 Unit(s)/Hr (10.5 mL/Hr) IV Continuous <Continuous>  insulin lispro (ADMELOG) corrective regimen sliding scale   SubCutaneous every 6 hours  insulin NPH human recombinant 3 Unit(s) SubCutaneous every 6 hours  midodrine. 20 milliGRAM(s) Oral three times a day  mupirocin 2% Ointment 1 Application(s) Topical two times a day  pantoprazole  Injectable 40 milliGRAM(s) IV Push two times a day  petrolatum Ophthalmic Ointment 1 Application(s) Right EYE two times a day  psyllium Powder 1 Packet(s) Oral daily  sodium chloride 2 Gram(s) Oral two times a day    MEDICATIONS  (PRN):  acetaminophen   Oral Liquid .. 650 milliGRAM(s) Oral every 6 hours PRN Temp greater or equal to 38C (100.4F), Mild Pain (1 - 3)  artificial tears (preservative free) Ophthalmic Solution 1 Drop(s) Both EYES three times a day PRN Dry Eyes  calamine/zinc oxide Lotion 1 Application(s) Topical two times a day PRN Rash and/or Itching  dextrose Oral Gel 15 Gram(s) Oral once PRN Blood Glucose LESS THAN 70 milliGRAM(s)/deciliter      LABS                                            7.7                   Neurophils% (auto):   78.1   (09-26 @ 08:48):    7.19 )-----------(259          Lymphocytes% (auto):  9.6                                           24.2                   Eosinphils% (auto):   0.7      Manual%: Neutrophils x    ; Lymphocytes x    ; Eosinophils x    ; Bands%: x    ; Blasts x                                    128    |  91     |  109                 Calcium: 9.4   / iCa: x      (09-26 @ 06:15)    ----------------------------<  213       Magnesium: 2.40                             4.5     |  25     |  2.87             Phosphorous: 4.0              ASSESSMENT & PLAN:     74 YO F with PMHx of IDDM, HTN, CKD, HFpEF, Breast Cancer s/p BL mastectomy, chemotherapy and radiation (2018), Respiratory and Cardiac Arrest (2018), 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 While in MICU patient noted with worsening renal failure requiring HD initiation, CGE/ Melena s/p EGD 8/31 found with esophagitis and erosive gastropathy, new right cerebellar infarct and fevers second to ESBL COLI and MSSA VAP, MSSA bacteremia, left ear otitis externa and drug rxn to cephalosporins Course further complicated by prolonged vent time s/p tracheostomy 9/1 and transferred to RCU 9/3 completed by recurrent fevers with high PIP, respiratory acidosis and concern for volume overloaded.     NEUROLOGY  # Metabolic Encephalopath vs NEW cerebella infarct   - Baseline MS AOX3 with short term memory changes per family however noted with concern for poor mentation in ICU  - MRI (8/17) with NEW right cerebellar infarct and old left PCA/occipital infarcts  - STEPHANIE (8/17) with AV showing two linear mobile echogenic elements attached to valve leaflets consistent with Lambel's excrescence, but no TRINITY thrombus, and lambel's excrescence with uncertain clinical implication.   - EEG (8/11-8/15) with no seizures   - ILR interrogation (9/7) with SR and PACs falsely recorded as AF.   - Attempted to resume ASA for medical management but held given GIB   - Continue on Lipitor for medical management   - Course complicated by questionable head and body shaking 9/8 however prolactin elevated and shakes head yes/no to some questions.   - Lethargy noted - Pending Rpt CT Head- new left parietal infarct    CARDIOVASCULAR  # HTN Urgency   # Septic vs vasoplegic shock   - Labile BPs ranging in between SBP 80s-200s and attempted labetalol, however noted with hypotension and bradycardia likely from BB toxicity vs shock state?    - Holding Labetalol and Catapres   - c/w Cardura for now   - Hypotensive in the 80s systolic overnight, requiring Midodrine 20mg x 1 (resolved)   - Stable today, still mildly hypotensive, but no intervention at this time     # Hx of HFpEF with likely ADHF with volume overload.   - ECHO (7/2023) with EF 59 with severe LVH and diastolic dysfunction   - STEPHANIE (8/18) with normal LVRVSF however noted with increased LA pressures and persistent LA septal bowing into RA.   - ECHO (8/11) with EF 60 with mild LVH, normal LVRVSF, and mild ddfxn.  - Remove volume with HD sessions and intermittent bumex pushes as BP allows    - s/p Bumex 2mg IV BID for now  - Will start Bumex gtt at 0.5mg/hr - Monitor UOP and electrolytes- Bumex dc'd    HEENT   # Left ear OE   - ENT evaluation (9/7) with no mastoid tenderness, however found with positive swelling and excoriation to left auricle and tenderness to manipulation of auricle. Debrided crusting of auricle and EAC evaluated with edema and unable to visualize TM. EAC debrided and found with watery exudate.   - s/p CiproHC (9/5 - 9/16)   - Bradford discontinued. Rash on torso concerning for possible drug rash  - ENT following and ear wick change QD   - Wick out but needs ? Debridement wound care called/fu ent   - ENT recommending CT IAC w/ IV con but family is refusing as concerned given CKD, kidneys wouldn't recover from IV contrast. Spoke with Nephrology, ENT, and patient's  at length. Planned for CT non con and per , decision will be made from there but for now doesn't want to risk further harming kidneys.  - CT IAC showing acute on chronic left-sided otitis externa and acute on chronic left-sided otomastoiditis. Superimposed left-sided tympanosclerosis. Superimposed cholesteatoma formation within the left tympanomastoid cavity cannot be excluded. No evidence of malignant otitis externa.  - ENT consulted (9/20) for white purulent discharge from left ear, recc: ENT:  acetic acid drops BID to left ear. Mupirocin ointment to the left pinna BID, cover with xeroform after placing mupirocin ointment. Pressure offloading of the left ear.  + L eye redness in setting of surrounding infectious process - Ophthalmology consulted via team, recs pending     # Oral Lesions with questionable Zoster vs Trauma?   - Patient with tongue pain and seen with anterior ulcerations consolidating and crusting and posterior ulceration.  - Case discussed with pathology resident and culture/ cytology sent.   - HSV negative and Path (9/6) with normal appearing epithelial cells singly and in clusters devoid of viral cytopathic effect.   - Continue on magic mouth wash for pain    RESPIRATORY  # AHHRF second to volume overload   - Hx of CKD and HFpEF presented with SOB and hypercarbia second to volume overload requiring intubation and HD initiation   - Vent weaning attempted however limited requiring tracheostomy (9/1) with IP   - Course complicated by PIPs elevated (50s) and respiratory acidosis second to secretions vs volume ?    - Vent adjusted 20/300/5/30 without improvement.   - POCUS (9/8) with BL pleural effusions (large on right and small on left)   - CT CHEST (9/8) with TBM, BL pleural effusions and continued consolidations   - Continue on vent and given TBM increased PEEP (9/9) to 20/300/8/40 with overall improvement   - Continue on Duoneb and HTS for airway clearance   - Continue volume removal with diuresis and aggressive UF sessions.   - Discussing possible thora but appears improved and will monitor.  ]  - Bedside US showing decrease in pleural effusion - hold off thoracentesis 9/10     GI  # UGIB   - CGE x 1 episode on 8/24 and melena x 2 episodes on 8/31 likely residual blood.   - EGD (8/31) found with esophagitis and erosive gastropathy  - Continue on PPI BID through late October and then PPI QD   - No melena     # Dysphagia   - NGT-TF continued   - Pending PEG however needs improvement in infectious status prior to placement.     RENAL  # Progressive CKD   - Presented volume overload and progressive RUSS on CKD (baseline CRE in 2s and mode into the 3s on admission) likely obstruction vs low flow state with shock vs AIN from drug reaction issue?  - POCUS with no signs of hydronephrosis   - Pressor support started with improvement in renal function  - Attempted steroid course in ICU without improvement in renal function   - Case discussed at length with renal and initiated on HD in ICU and now continued on HD TTHS, however remains volume overloaded.   - Nephro following - Plan to resume HD TIW - Nephro recc holding HD alissa (9/21)   - Monitor renal function and UOP, and electrolytes for now  - Monitor urine output - Will perform bladder scan/straight cath as needed if retaining urine   - Nephro recs to start on Bumex gtt - Will monitor BMP closely     Hyponatremia (improving) Na 123>>125  - c/w Salt tabs - s/p Hypertonic 1.5%NS @ 50cc/hr x 5 hr  - Renal- no plan for HD at this time - Plan to start on Bumex gtt    # Hyperphosphatemia (resolved)   - PTH normal and elevated phos likely from renal failure  - s/p Phos binder with Renvela - now d'beth as level wnl      INFECTIOUS DISEASE  # ESBL ECOLI and MSSA VAP c/b MSSA bacteremia   - RVP/ COVID (8/11) negative   - SCx (8/11) with MSSA s/p cefepime (8/12-8/13) and then ancef (8/14) however noted with drug reaction and then changed to vanco and aztreonam (8/12-8/13) in ICU .   - Course complicated by recurrent fevers and return of MSSA with bacteremia.   - SCx (9/1) with MSSA and ESBL ECOLI   - BAL (9/1) with MSSA   - BCx (9/1) with MSSA and cleared on (9/4)   - ECHO (9/6) unable to rule out endocarditis   - Continue on Bradford (9/4 - ) and Vanco BY DOSE POST HD (9/4, 9/7, 9/9 ...) with duration TBD at this time.   - Given inability to rule out endocarditis will discuss possible 4 wks with ID?   - Course complicated by fever (9/7) and UA with leuks but no bacteria, however compared to prior improved, RVP/COVID and SCx negative, and RPT CXR similar to prior   - LE DOPPLERS- Blood and sputum cx NTD; Acute left deep vein thrombosis of the left popliteal vein.  - Febrile overnight 102 recultured and sent off   -Pt receiving vanco post HD however today given vanco 750mg x 1 will check Vanco level at end of HD session and dose   - BCx (9/20): sent- NTD  - ID recc CT C/A/P w/ IV contrast - on hold d/t unsure if pt tolerate HD alissa  - C/w fluconazole 200 qd (9/21)    # Left ear OE   - ENT evaluation (9/7) with no mastoid tenderness, however found with positive swelling and excoriation to left auricle and tenderness to manipulation of auricle. Debrided crusting of auricle and EAC evaluated with edema and unable to visualize TM. EAC debrided and found with watery exudate.   - s/p CiproHC (9/5 - 9/16)     # MRSA PCRs   - MRSA PCR (8/11 and 8/14) negative   - Next MRSA + PCR ordered for 10/8     HEME  # Anemia second to GIB vs renal disease   - Hold chemical DVT PPX given bleeding/ waxing and waning HH   - s/p multiple units of PRBCs (8/15, 8/21, 8/28, 8/31 and 9/8)   - Anemia panel with AOCD but with low %sat and ferrous sulfate added to optimize   - Monitor HH and discuss with renal for EPO sometime next week.   - Spoke with GI for clearance to start Heparin gtt for + DVT   - c/w Heparin gtt (9/21)    Vascular  # DVT  - Pt with + popliteal DVT on SCD Spoke with GI no absolute contraindication for starting Heparin - advise close monitoring for bleeding - Do stat CTA if bleeding occurs and call IR   - Pt specific heparin gtt started with goal PTT 60-85  - will monitor closely   - c/w Heparin gtt (9/21)    ONC   # Hx of Breast CA   - Patient dx in 2018 and s/p BL mastectomy (radical on right) and chemo and RT.   - Supportive care.     ENDOCRINE  # IDDM2   - Continue on NPH 3U and ISS   - Monitor FS and adjust as needed     LINES/ TUBES  - R IJ ALEXXLEY (8/19 - 9/21)     SKIN  # Drug Eruption   - Attempted cefepime (8/12) vs ancef (8/13-8/14) and then noted with drug rxn.   - Hold further cephalosporins at this time   - s/p Steroids with prednisone 40 (8/18 - 9/2) then prednisone 30 (9/3-9/7), then prednisone 20 (9/8 - 9/10), then prednisone 10mg (9/11-9/13) then turn off.     ETHICS/ GOC    - FULL CODE     Follow Up:  Monitor BP-  Midodrine just increased today from 10mg to 20 TID and bumex gtt was dc'd  Continue IV abx for MSSA bacteremia- ID following  Continue Fluconazole for fungal spores- found on ear culture  May need HD/CVVH- F/U w/ Renal  Neuro re-consulted as found to have new left parietal infarct. (Dr. Katty Charles-private to see pt tomorrow 9/27)      Rudi DUNBAR [id number]

## 2023-09-27 NOTE — PROGRESS NOTE ADULT - SUBJECTIVE AND OBJECTIVE BOX
minimally communicative  on Levophed gtt    VITAL:  T(C): , Max: 37.6 (09-26-23 @ 12:00)  T(F): , Max: 99.7 (09-26-23 @ 12:00)  HR: 102 (09-27-23 @ 07:42)  BP: 130/57 (09-27-23 @ 06:00)  BP(mean): 72 (09-27-23 @ 06:00)  RR: 23 (09-27-23 @ 06:00)  SpO2: 95% (09-27-23 @ 07:42)  urine output 700cc/24h      PHYSICAL EXAM:  Constitutional: trach to vent  HEENT: tracheostomy + NGT  Respiratory: CTAB/L  Cardiovascular: S1 and S2  Gastrointestinal: BS+, soft, NT/ND  Extremities: + peripheral edema  Neurological: reduced generalized strength   : (+) Wheeler  Skin: No rashes    LABS:                        8.4    11.31 )-----------( 351      ( 27 Sep 2023 02:40 )             27.6     Na(129)/K(4.3)/Cl(91)/HCO3(22)/BUN(107)/Cr(2.99)Glu(188)/Ca(9.6)/Mg(2.60)/PO4(4.7)    09-27 @ 02:50  Na(125)/K(4.5)/Cl(91)/HCO3(25)/BUN(110)/Cr(2.97)Glu(168)/Ca(9.3)/Mg(2.50)/PO4(4.4)    09-26 @ 16:05  Na(128)/K(4.5)/Cl(91)/HCO3(25)/BUN(109)/Cr(2.87)Glu(213)/Ca(9.4)/Mg(2.40)/PO4(4.0)    09-26 @ 06:15  Na(128)/K(4.4)/Cl(93)/HCO3(26)/BUN(102)/Cr(2.88)Glu(200)/Ca(9.3)/Mg(2.40)/PO4(3.9)    09-25 @ 21:30  Na(129)/K(4.5)/Cl(90)/HCO3(25)/BUN(108)/Cr(2.77)Glu(178)/Ca(9.3)/Mg(2.50)/PO4(4.0)    09-25 @ 05:15      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    (2)RUSS - new RUSS - mild - nonoliguric, on q8h IV Bumex - RUSS is prerenal most likely    (3)Hyponatremia - low but slowly improving/stable, on NaCl tabs    (4)Pulm- hypercapnic respiratory failure on admission - now s/p trach; remains on vent     (5)ID - now on Dapto and Fluconazole - dosed for low GFR    (6)Anemia - receiving DAVID and PRBCs intermittently      RECOMMEND:  (1)No HD for now  (2)NaCl tabs as ordered  (3)IV Bumex as ordered  (4)Weaning down pressors as able  (5)Dose new meds for GFR 10-15ml/min      Jimmy Colon MD  [facility]  Office/on call physician: [phone]  Cell (7a-7p): [phone]       on vent; sedated on Precedex  on Levophed gtt    VITAL:  T(C): , Max: 37.6 (09-26-23 @ 12:00)  T(F): , Max: 99.7 (09-26-23 @ 12:00)  HR: 102 (09-27-23 @ 07:42)  BP: 130/57 (09-27-23 @ 06:00)  BP(mean): 72 (09-27-23 @ 06:00)  RR: 23 (09-27-23 @ 06:00)  SpO2: 95% (09-27-23 @ 07:42)  urine output 700cc/24h      PHYSICAL EXAM:  Constitutional: trach to vent  HEENT: trach-vent, (+)NGT  Respiratory: coarse BS b/l  Cardiovascular: tachy reg s1s2  Gastrointestinal: BS+, soft, NT/ND  Extremities: + peripheral edema  Neurological: reduced generalized strength   : (+) Wheeler  Skin: No rashes  Access: (+)[facility]    LABS:                        8.4    11.31 )-----------( 351      ( 27 Sep 2023 02:40 )             27.6     Na(129)/K(4.3)/Cl(91)/HCO3(22)/BUN(107)/Cr(2.99)Glu(188)/Ca(9.6)/Mg(2.60)/PO4(4.7)    09-27 @ 02:50  Na(125)/K(4.5)/Cl(91)/HCO3(25)/BUN(110)/Cr(2.97)Glu(168)/Ca(9.3)/Mg(2.50)/PO4(4.4)    09-26 @ 16:05  Na(128)/K(4.5)/Cl(91)/HCO3(25)/BUN(109)/Cr(2.87)Glu(213)/Ca(9.4)/Mg(2.40)/PO4(4.0)    09-26 @ 06:15  Na(128)/K(4.4)/Cl(93)/HCO3(26)/BUN(102)/Cr(2.88)Glu(200)/Ca(9.3)/Mg(2.40)/PO4(3.9)    09-25 @ 21:30  Na(129)/K(4.5)/Cl(90)/HCO3(25)/BUN(108)/Cr(2.77)Glu(178)/Ca(9.3)/Mg(2.50)/PO4(4.0)    09-25 @ 05:15      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 Is her respiratory failure at present in part due to overload? Reasonable to reattempt dialysis starting today to offload fluid/improve respiratory status, as urine output is limited despite IV Bumex over past 24h    (5)ID - now on Dapto and Fluconazole - dosed for low GFR    (6)Anemia - receiving DAVID and PRBCs intermittently      RECOMMEND:  (1)Reinitiate HD today (s/p shiley placement earlier today) - HD today x 2.5h, 1.5kg UF as able, adjust pressors as needed to keep SBP>90  (2)Dose new meds for GFR 10-15ml/min  (3)Counseled  at bedside - advised regarding poor overall prognosis/likelihood that she will never regain significant QOL from this point forward    Jimmy Colon MD  [facility]  Office/on call physician: [phone]  Cell (7a-7p): [phone]

## 2023-09-27 NOTE — PROGRESS NOTE ADULT - SUBJECTIVE AND OBJECTIVE BOX
Patient is a 73y old  Female who presents with a chief complaint of Respiratory distress (27 Sep 2023 13:54)      SUBJECTIVE / OVERNIGHT EVENTS: In [facility], R IJ HD catheter placed, NGT in place, acidotic on VBG, trache to vent, anasarca, on IV BUMEX, on Levo, on Heparin drip, on stress dose HYdrocortisone, FS in range, uremic, awaiting HD, CT C/A/P w no acute findings.     MEDICATIONS  (STANDING):  acetic acid 0.25% Topical Irrigation 1 Application(s) Topical two times a day  albuterol/ipratropium for Nebulization 3 milliLiter(s) Nebulizer every 6 hours  artificial tears (preservative free) Ophthalmic Solution 1 Drop(s) Right EYE every 2 hours  atorvastatin 40 milliGRAM(s) Oral at bedtime  buMETAnide Injectable 2 milliGRAM(s) IV Push <User Schedule>  chlorhexidine 0.12% Liquid 15 milliLiter(s) Oral Mucosa every 12 hours  chlorhexidine 2% Cloths 1 Application(s) Topical daily  chlorhexidine 4% Liquid 1 Application(s) Topical <User Schedule>  DAPTOmycin IVPB 500 milliGRAM(s) IV Intermittent every 48 hours  dexMEDEtomidine Infusion 0.3 MICROgram(s)/kG/Hr (4.99 mL/Hr) IV Continuous <Continuous>  dextrose 5%. 1000 milliLiter(s) (100 mL/Hr) IV Continuous <Continuous>  dextrose 5%. 1000 milliLiter(s) (50 mL/Hr) IV Continuous <Continuous>  dextrose 50% Injectable 12.5 Gram(s) IV Push once  dextrose 50% Injectable 25 Gram(s) IV Push once  dextrose 50% Injectable 25 Gram(s) IV Push once  epoetin odalis (EPOGEN) Injectable 87275 Unit(s) SubCutaneous <User Schedule>  erythromycin   Ointment 1 Application(s) Left EYE four times a day  ferrous    sulfate Liquid 300 milliGRAM(s) Oral daily  fluconAZOLE IVPB 200 milliGRAM(s) IV Intermittent every 24 hours  glucagon  Injectable 1 milliGRAM(s) IntraMuscular once  heparin  Infusion 1050 Unit(s)/Hr (10.5 mL/Hr) IV Continuous <Continuous>  hydrocortisone sodium succinate Injectable 50 milliGRAM(s) IV Push every 8 hours  insulin lispro (ADMELOG) corrective regimen sliding scale   SubCutaneous every 6 hours  insulin NPH human recombinant 3 Unit(s) SubCutaneous every 6 hours  midodrine. 20 milliGRAM(s) Oral three times a day  mupirocin 2% Ointment 1 Application(s) Topical two times a day  norepinephrine Infusion 0.06 MICROgram(s)/kG/Min (7.48 mL/Hr) IV Continuous <Continuous>  ofloxacin 0.3% Solution 1 Drop(s) Left EYE every 2 hours  pantoprazole  Injectable 40 milliGRAM(s) IV Push two times a day  petrolatum Ophthalmic Ointment 1 Application(s) Right EYE two times a day  psyllium Powder 1 Packet(s) Oral daily  sodium chloride 2 Gram(s) Oral two times a day    MEDICATIONS  (PRN):  acetaminophen   Oral Liquid .. 650 milliGRAM(s) Oral every 6 hours PRN Temp greater or equal to 38C (100.4F), Mild Pain (1 - 3)  artificial tears (preservative free) Ophthalmic Solution 1 Drop(s) Both EYES three times a day PRN Dry Eyes  calamine/zinc oxide Lotion 1 Application(s) Topical two times a day PRN Rash and/or Itching  dextrose Oral Gel 15 Gram(s) Oral once PRN Blood Glucose LESS THAN 70 milliGRAM(s)/deciliter  sodium chloride 0.9% lock flush 10 milliLiter(s) IV Push every 1 hour PRN Pre/post blood products, medications, blood draw, and to maintain line patency      Vital Signs Last 24 Hrs  T(F): 96.9 (09-27-23 @ 17:28), Max: 98.2 (09-27-23 @ 12:00)  HR: 97 (09-27-23 @ 17:28) (92 - 112)  BP: 117/51 (09-27-23 @ 17:28) (90/39 - 131/30)  RR: 20 (09-27-23 @ 17:28) (16 - 29)  SpO2: 96% (09-27-23 @ 17:00) (89% - 100%)  Telemetry:   CAPILLARY BLOOD GLUCOSE      POCT Blood Glucose.: 221 mg/dL (27 Sep 2023 13:38)  POCT Blood Glucose.: 199 mg/dL (27 Sep 2023 05:33)  POCT Blood Glucose.: 205 mg/dL (26 Sep 2023 23:43)    I&O's Summary    26 Sep 2023 07:01  -  27 Sep 2023 07:00  --------------------------------------------------------  IN: 1327.6 mL / OUT: 760 mL / NET: 567.6 mL    27 Sep 2023 07:01  -  27 Sep 2023 18:28  --------------------------------------------------------  IN: 641.7 mL / OUT: 135 mL / NET: 506.7 mL        PHYSICAL EXAM:  GENERAL: NAD, well-developed  HEAD:  Atraumatic, Normocephalic  EYES: EOMI, PERRLA, conjunctiva and sclera clear  NECK: Supple, No JVD  CHEST/LUNG: Clear to auscultation bilaterally; No wheeze  HEART: Regular rate and rhythm; No murmurs, rubs, or gallops  ABDOMEN: Soft, Nontender, Nondistended; Bowel sounds present  EXTREMITIES:  2+ Peripheral Pulses, No clubbing, cyanosis, or edema  PSYCH: AAOx3  NEUROLOGY: non-focal  SKIN: No rashes or lesions    LABS:                        8.4    11.31 )-----------( 351      ( 27 Sep 2023 02:40 )             27.6     09-27    129<L>  |  91<L>  |  107<H>  ----------------------------<  188<H>  4.3   |  22  |  2.99<H>    Ca    9.6      27 Sep 2023 02:50  Phos  4.7     09-27  Mg     2.60     09-27    TPro  6.1  /  Alb  2.4<L>  /  TBili  0.4  /  DBili  x   /  AST  57<H>  /  ALT  17  /  AlkPhos  409<H>  09-27    PTT - ( 27 Sep 2023 02:40 )  PTT:29.3 sec  CARDIAC MARKERS ( 27 Sep 2023 02:50 )  x     / x     / 46 U/L / x     / x          Urinalysis Basic - ( 27 Sep 2023 02:50 )    Color: x / Appearance: x / SG: x / pH: x  Gluc: 188 mg/dL / Ketone: x  / Bili: x / Urobili: x   Blood: x / Protein: x / Nitrite: x   Leuk Esterase: x / RBC: x / WBC x   Sq Epi: x / Non Sq Epi: x / Bacteria: x        RADIOLOGY & ADDITIONAL TESTS:    Imaging Personally Reviewed:    Consultant(s) Notes Reviewed:      Care Discussed with Consultants/Other Providers:

## 2023-09-27 NOTE — PROGRESS NOTE ADULT - SUBJECTIVE AND OBJECTIVE BOX
INTERVAL HPI/OVERNIGHT EVENTS:     SUBJECTIVE: Patient seen and examined at bedside    OBJECTIVE:    VITAL SIGNS:  ICU Vital Signs Last 24 Hrs  T(C): 37.2 (30 Aug 2023 04:00), Max: 37.6 (29 Aug 2023 16:00)  T(F): 99 (30 Aug 2023 04:00), Max: 99.6 (29 Aug 2023 16:00)  HR: 59 (30 Aug 2023 11:02) (59 - 73)  BP: --  BP(mean): --  ABP: 159/31 (30 Aug 2023 09:00) (100/18 - 177/38)  ABP(mean): 73 (30 Aug 2023 09:00) (47 - 86)  RR: 12 (30 Aug 2023 09:00) (12 - 25)  SpO2: 100% (30 Aug 2023 11:02) (95% - 100%)    O2 Parameters below as of 30 Aug 2023 09:00  Patient On (Oxygen Delivery Method): ventilator    O2 Concentration (%): 30      Mode: AC/ CMV (Assist Control/ Continuous Mandatory Ventilation), RR (machine): 12, TV (machine): 360, FiO2: 30, PEEP: 5, ITime: 0.74, MAP: 8, PIP: 29    08-29 @ 07:01  -  08-30 @ 07:00  --------------------------------------------------------  IN: 1904.5 mL / OUT: 3787 mL / NET: -1882.5 mL    08-30 @ 07:01  -  08-30 @ 11:08  --------------------------------------------------------  IN: 48 mL / OUT: 30 mL / NET: 18 mL      CAPILLARY BLOOD GLUCOSE      POCT Blood Glucose.: 261 mg/dL (30 Aug 2023 05:52)        PHYSICAL EXAM:  General: Comfortable, no acute distress, cooperative with exam.   HEENT: PERRLA, EOMI, moist mucous membranes.  Respiratory: CTAB, mechanical breath sounds, no coughing, wheezes, crackles, or rales.  CV: RRR, S1S2, no murmurs, rubs or gallops. No JVD. Distal pulses intact.  Abdominal: Soft, nontender, nondistended, no rebound or guarding, normal bowel sounds.  Neurology: Sedated/intubated, responsive to verbal stimuli, no focal neuro defects, CENTENO x 4.  Extremities: +Pitting edema bilaterally      MEDICATIONS:  MEDICATIONS  (STANDING):  albuterol/ipratropium for Nebulization 3 milliLiter(s) Nebulizer every 8 hours  aspirin  chewable 81 milliGRAM(s) Oral daily  atorvastatin 40 milliGRAM(s) Oral at bedtime  chlorhexidine 0.12% Liquid 15 milliLiter(s) Oral Mucosa every 12 hours  chlorhexidine 2% Cloths 1 Application(s) Topical daily  cloNIDine 0.3 milliGRAM(s) Oral every 8 hours  dexMEDEtomidine Infusion 1 MICROgram(s)/kG/Hr (16.6 mL/Hr) IV Continuous <Continuous>  dextrose 5%. 1000 milliLiter(s) (100 mL/Hr) IV Continuous <Continuous>  dextrose 5%. 1000 milliLiter(s) (50 mL/Hr) IV Continuous <Continuous>  dextrose 50% Injectable 25 Gram(s) IV Push once  dextrose 50% Injectable 12.5 Gram(s) IV Push once  dextrose 50% Injectable 25 Gram(s) IV Push once  doxazosin 6 milliGRAM(s) Oral <User Schedule>  glucagon  Injectable 1 milliGRAM(s) IntraMuscular once  insulin lispro (ADMELOG) corrective regimen sliding scale   SubCutaneous every 6 hours  labetalol 600 milliGRAM(s) Oral three times a day  niCARdipine Infusion 2.5 mG/Hr (12.5 mL/Hr) IV Continuous <Continuous>  pantoprazole  Injectable 40 milliGRAM(s) IV Push two times a day  predniSONE   Tablet 40 milliGRAM(s) Oral daily  sevelamer carbonate Powder 1600 milliGRAM(s) Oral three times a day    MEDICATIONS  (PRN):  acetaminophen   Oral Liquid .. 650 milliGRAM(s) Oral every 6 hours PRN Temp greater or equal to 38C (100.4F), Mild Pain (1 - 3)  dextrose Oral Gel 15 Gram(s) Oral once PRN Blood Glucose LESS THAN 70 milliGRAM(s)/deciliter      ALLERGIES:  Allergies    isoniazid (Rash)  nafcillin (Unknown)  hydrALAZINE (Rash)  vitamin E (Short breath; Urticaria; Hives)  doxycycline (Rash)  cefepime (Rash)  NIFEdipine (Urticaria; Hives)    Intolerances        LABS:                        8.0    11.27 )-----------( 229      ( 30 Aug 2023 00:05 )             24.6     08-30    135  |  97<L>  |  57<H>  ----------------------------<  402<H>  3.3<L>   |  23  |  2.29<H>    Ca    8.2<L>      30 Aug 2023 00:05  Phos  4.1     08-30  Mg     1.80     08-30    TPro  5.6<L>  /  Alb  2.5<L>  /  TBili  0.2  /  DBili  x   /  AST  9   /  ALT  10  /  AlkPhos  123<H>  08-30    PT/INR - ( 30 Aug 2023 00:05 )   PT: 11.4 sec;   INR: 1.02 ratio         PTT - ( 29 Aug 2023 01:10 )  PTT:28.3 sec  Urinalysis Basic - ( 30 Aug 2023 00:05 )    Color: x / Appearance: x / SG: x / pH: x  Gluc: 402 mg/dL / Ketone: x  / Bili: x / Urobili: x   Blood: x / Protein: x / Nitrite: x   Leuk Esterase: x / RBC: x / WBC x   Sq Epi: x / Non Sq Epi: x / Bacteria: x        RADIOLOGY & ADDITIONAL TESTS: Reviewed.       INTERVAL HPI/OVERNIGHT EVENTS: admitted to MICU6 overnight for hypotension requiring pressor support w/ levophed. noted w/ worsening hypoxic/hypercapnic respiratory failure, has had increasing fio2 requirements and respiratory acidosis on abg over the last 24hrs. low TVs <200 on vent this am w/ worsening hypoxia. s/p bedside bronch this am showing dynamic airway obstruction. trach was changed to 6dXLT w/ only some improvement in airway collapse.    SUBJECTIVE: Patient seen and examined at bedside. remains encephalopathic.     OBJECTIVE:    VITAL SIGNS:  ICU Vital Signs Last 24 Hrs  T(C): 37.2 (30 Aug 2023 04:00), Max: 37.6 (29 Aug 2023 16:00)  T(F): 99 (30 Aug 2023 04:00), Max: 99.6 (29 Aug 2023 16:00)  HR: 59 (30 Aug 2023 11:02) (59 - 73)  BP: --  BP(mean): --  ABP: 159/31 (30 Aug 2023 09:00) (100/18 - 177/38)  ABP(mean): 73 (30 Aug 2023 09:00) (47 - 86)  RR: 12 (30 Aug 2023 09:00) (12 - 25)  SpO2: 100% (30 Aug 2023 11:02) (95% - 100%)    O2 Parameters below as of 30 Aug 2023 09:00  Patient On (Oxygen Delivery Method): ventilator    O2 Concentration (%): 30      Mode: AC/ CMV (Assist Control/ Continuous Mandatory Ventilation), RR (machine): 12, TV (machine): 360, FiO2: 30, PEEP: 5, ITime: 0.74, MAP: 8, PIP: 29    08-29 @ 07:01  -  08-30 @ 07:00  --------------------------------------------------------  IN: 1904.5 mL / OUT: 3787 mL / NET: -1882.5 mL    08-30 @ 07:01  -  08-30 @ 11:08  --------------------------------------------------------  IN: 48 mL / OUT: 30 mL / NET: 18 mL      CAPILLARY BLOOD GLUCOSE      POCT Blood Glucose.: 261 mg/dL (30 Aug 2023 05:52)        PHYSICAL EXAM:  General: Comfortable, no acute distress, cooperative with exam.   HEENT: PERRLA, EOMI, moist mucous membranes.  Respiratory: CTAB, mechanical breath sounds, no coughing, wheezes, crackles, or rales.  CV: RRR, S1S2, no murmurs, rubs or gallops. No JVD. Distal pulses intact.  Abdominal: Soft, nontender, nondistended, no rebound or guarding, normal bowel sounds.  Neurology:  unresponsive  Extremities: +Pitting edema bilaterally      MEDICATIONS:  MEDICATIONS  (STANDING):  albuterol/ipratropium for Nebulization 3 milliLiter(s) Nebulizer every 8 hours  aspirin  chewable 81 milliGRAM(s) Oral daily  atorvastatin 40 milliGRAM(s) Oral at bedtime  chlorhexidine 0.12% Liquid 15 milliLiter(s) Oral Mucosa every 12 hours  chlorhexidine 2% Cloths 1 Application(s) Topical daily  cloNIDine 0.3 milliGRAM(s) Oral every 8 hours  dexMEDEtomidine Infusion 1 MICROgram(s)/kG/Hr (16.6 mL/Hr) IV Continuous <Continuous>  dextrose 5%. 1000 milliLiter(s) (100 mL/Hr) IV Continuous <Continuous>  dextrose 5%. 1000 milliLiter(s) (50 mL/Hr) IV Continuous <Continuous>  dextrose 50% Injectable 25 Gram(s) IV Push once  dextrose 50% Injectable 12.5 Gram(s) IV Push once  dextrose 50% Injectable 25 Gram(s) IV Push once  doxazosin 6 milliGRAM(s) Oral <User Schedule>  glucagon  Injectable 1 milliGRAM(s) IntraMuscular once  insulin lispro (ADMELOG) corrective regimen sliding scale   SubCutaneous every 6 hours  labetalol 600 milliGRAM(s) Oral three times a day  niCARdipine Infusion 2.5 mG/Hr (12.5 mL/Hr) IV Continuous <Continuous>  pantoprazole  Injectable 40 milliGRAM(s) IV Push two times a day  predniSONE   Tablet 40 milliGRAM(s) Oral daily  sevelamer carbonate Powder 1600 milliGRAM(s) Oral three times a day    MEDICATIONS  (PRN):  acetaminophen   Oral Liquid .. 650 milliGRAM(s) Oral every 6 hours PRN Temp greater or equal to 38C (100.4F), Mild Pain (1 - 3)  dextrose Oral Gel 15 Gram(s) Oral once PRN Blood Glucose LESS THAN 70 milliGRAM(s)/deciliter      ALLERGIES:  Allergies    isoniazid (Rash)  nafcillin (Unknown)  hydrALAZINE (Rash)  vitamin E (Short breath; Urticaria; Hives)  doxycycline (Rash)  cefepime (Rash)  NIFEdipine (Urticaria; Hives)    Intolerances        LABS:                        8.0    11.27 )-----------( 229      ( 30 Aug 2023 00:05 )             24.6     08-30    135  |  97<L>  |  57<H>  ----------------------------<  402<H>  3.3<L>   |  23  |  2.29<H>    Ca    8.2<L>      30 Aug 2023 00:05  Phos  4.1     08-30  Mg     1.80     08-30    TPro  5.6<L>  /  Alb  2.5<L>  /  TBili  0.2  /  DBili  x   /  AST  9   /  ALT  10  /  AlkPhos  123<H>  08-30    PT/INR - ( 30 Aug 2023 00:05 )   PT: 11.4 sec;   INR: 1.02 ratio         PTT - ( 29 Aug 2023 01:10 )  PTT:28.3 sec  Urinalysis Basic - ( 30 Aug 2023 00:05 )    Color: x / Appearance: x / SG: x / pH: x  Gluc: 402 mg/dL / Ketone: x  / Bili: x / Urobili: x   Blood: x / Protein: x / Nitrite: x   Leuk Esterase: x / RBC: x / WBC x   Sq Epi: x / Non Sq Epi: x / Bacteria: x        RADIOLOGY & ADDITIONAL TESTS: Reviewed.

## 2023-09-27 NOTE — PROGRESS NOTE ADULT - ASSESSMENT
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 family declined renal biopsy started on prednisone  s/p trach 9/1 and BAL with MSSA   ET cx with ESBL and MSSA  started on ana cristina 9/1  blood cx 9/1: MSSA   repeat negative 9/4, 9/8  CXR with b/l opacities, R increased  L ear edema and otitis externa, the last cx showed candida and ENT stated it could be fungal (saw black spores?)    initial  resp failure for ?fluid ovrer load but also had MSSA in the sputum, got vanco, aztreonam one day then cefepime and had rash, renal failure which was considered due to cefepime and then received 2 days of cefazolin and then off, now with trach and bronc on 9/1 with MSSA bacteremia and BAL also MSSA  another ET cx with MSSA and ESBL E-coli  hypotension, MSSA bacteremia likely due to pneumonia, repeat blood cx negative 9/4, TTE limited unable to exclude endocarditis  L otitis externa on ana cristina since 9/1 but worsening edema and black discoloration with bullae forming and persistent fever (ear cx was negative)  Ct showed acute on chronic otitis externa and otomastoiditis , superimposed cholesteatoma can not be excluded, no malignant otitis externa  pt again febrile, ENT stated there was black spores which could be a fungal infection and the last cx showed candida albicans  bleeding from HD site and lililey removed   new erythematous patchy rash, did not improved after discontinuing the ana cristina so vanco switched to dapto  also hypotensive now and ?uveitis vs endophthalmitis, head CT with acute/subacute ischemia and old occipital infarct, chest/abd CT with unchanged  RUL consolidation, decreased BEVERLY consolidation  pt uremic as well, plan for HD again  * f/u the blood and urine cx  * c/w dapto 500 q 48 (if plan is for HD, should be given after HD) as the pt still with extensive rash and hypotensive  * monitor CK  * c/w fluconazole 200 qd, started 9/21, will likely complete a 2 week course  * will need a 4 week course  for the MSSA bacteremia from the negative blood cx through 10/2  * monitor CBC/diff and renal function  * f/u with ophthalmology    The above assessment and plan was discussed with the primary team    Yumiko Conner MD  contact on teams  After 5pm and on weekends call [phone]

## 2023-09-27 NOTE — PROGRESS NOTE ADULT - PROBLEM SELECTOR PLAN 1
Multifactorial     - Aspiration, acute on chronic diastolic CHF   s/p trach 9/1  now with likely sepsis   restarted Abx

## 2023-09-27 NOTE — PROGRESS NOTE ADULT - ASSESSMENT
Assessment and Recommendations:  73y female with a past medical history/ocular history of DM, HTN, CKD, breast CA, respiratory arrest and cardiac arrest (2018), CVA with residual weakness, aspiration PNA h/o trache, PEG, Diabetic retinopathy, consulted for red left eye, found to have left sided injection, chemosis, and descemet membrane folds concerning for early infection vs. HSV.     #Injection OS, concerning for possible early infection   - Unable to test patient's visual acuity, color vision, EOM, due to current status  - Anterior exam with injection, chemosis, and diffuse d-folds OS  - DDx includes early endophthalmitis vs. HSV  - Please start Ofloxacin 1 drop left eye 6 times a day (ordered by ophtho)  - Ophtho will follow    Seen and discussed with Dr. Alfaro, Attending.     Outpatient Follow-up: Patient should follow-up with his/her ophthalmologist or with [facility] Department of Ophthalmology within 1 week of after discharge at:    [address]  [place] [address]  [phone]    Latricia France MD, PGY-3  Contact: [employer] [address]     Assessment and Recommendations:  73y female with a past medical history/ocular history of DM, HTN, CKD, breast CA, respiratory arrest and cardiac arrest (2018), CVA with residual weakness, aspiration PNA h/o trache, PEG, Diabetic retinopathy, consulted for red left eye, found to have left sided injection, chemosis, and descemet membrane folds concerning for early infection vs. HSV.     #Chemosis OD  # Hemorraghic Chemosis OS  # Delle OS  # POOJA OU  - Unable to test patient's visual acuity, color vision, EOM, due to current status  - Anterior exam OS with subconj heme, chemosis, and diffuse d-folds  - Significant SPK OU  - DDx includes early endophthalmitis vs. HSV  - Continue Ofloxacin 1 drop left eye 6 times a day (ordered by ophtho)  - Start erythromycin ointment 4 times a day in the left eye (ordered by ophtho)  - PLEASE TAPE LEFT EYE SHUT AT ALL TIMES (can remove tape when administering eye drops)  - Start preservative free artificial tears 4 times a day in the right eye (ordered by ophtho)  - Continue lacrilube ointment twice a day in the right eye   - Please start Valtrex 500 TID (if pt can tolerate per primary team) for coverage of possible HSV  - Ophtho will follow    Seen and discussed with Dr. Chau, Attending.     Outpatient Follow-up: Patient should follow-up with his/her ophthalmologist or with [facility] Department of Ophthalmology within 1 week of after discharge at:    [address]  [place] [address]  [phone]    Latricia France MD, PGY-3  Contact: [phone]

## 2023-09-27 NOTE — PROGRESS NOTE ADULT - ASSESSMENT
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 While in MICU patient noted with worsening renal failure requiring HD initiation, CGE/ Melena s/p EGD 8/31 found with esophagitis and erosive gastropathy, new right cerebellar infarct and fevers second to ESBL COLI and MSSA VAP, MSSA bacteremia, left ear otitis externa and drug rxn to cephalosporins Course further complicated by prolonged vent time s/p tracheostomy 9/1 and transferred to RCU 9/3 completed by recurrent fevers with high PIP, respiratory acidosis and concern for volume overloaded.   CTH repeated 9/26 for concern for encephalopathy - has known now - chronic bilateral cerebellar infarcts, L PCA infarct. L parietal hypodensity seen on prior CT likely artifactual    Impression:   1) Worsening RUE shaking of unclear etiology at this time; no evidence of seizures, resolved   2) AMS possibly related to TME, no sz on EEG. Dysconjugate gaze and roving eye movements improved. CTH unchanged although not able to visualize brainstem well. Can consider uremic encephaloapthy as kidney function worsening/ infectious encephalopathy   3) L PCA stroke, ESUS s/p ILR, also with bilateral centrum semiovale watershed infarcts   4) New Embolic strokes seen on MRI 8/17 - R cerebellum, midbrain, multiple other tiny embolic infarcts.   5) Dementia   6) MSSA bacteremia, r/o endocarditis - on Daptomycin  7) Acute popliteal DVT on hep gtt    Recommendations   [] appreciate ID recs -  on Vanco for MSSA bacteremia and Meropenem for E Coli   [] repeat TTE or consider STEPHANIE to rule out endocarditis   [] GI following for coffee ground emesis - esophagitis seen on colonoscopy, monitor for bleeding now that on heparin gtt  [] family declined kidney biopsy for AIN -> s/p steroid tx   [] C/w home ASA 81 mg if no contraindications   [] on hep gtt for DVT, can target normal PTT range as stroke more remote at this point   [] C/w home Atorvastatin 10 mg qhs   [] would interrogate ILR and review tele to see if pAF -. no AF seen  [] DVT/GI ppx  [] Neurochecks q4hr   [] BP goals: Normotension   [] PT/OT when able   [] Diet: NPO w/ tube feeds, family considering PEG    [] HgA1C goals < 7.0   [] continue to address GOC with family      Katty Charles DO  Vascular Neurology  Office [phone]         74 YO F with PMHx of IDDM, HTN, CKD, HFpEF, Breast Cancer s/p BL mastectomy, chemotherapy and radiation (2018), Respiratory and Cardiac Arrest (2018), 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 While in MICU patient noted with worsening renal failure requiring HD initiation, CGE/ Melena s/p EGD 8/31 found with esophagitis and erosive gastropathy, new right cerebellar infarct and fevers second to ESBL COLI and MSSA VAP, MSSA bacteremia, left ear otitis externa and drug rxn to cephalosporins Course further complicated by prolonged vent time s/p tracheostomy 9/1 and transferred to RCU 9/3 completed by recurrent fevers with high PIP, respiratory acidosis and concern for volume overloaded.   CTH repeated 9/26 for concern for encephalopathy - has known now - chronic bilateral cerebellar infarcts, L PCA infarct. L parietal hypodensity seen on prior CT likely artifactual    Impression:   Metabolic encephalopathy related to shock, infection, doubt new stroke  L PCA stroke, ESUS s/p ILR, also with bilateral centrum semiovale watershed infarcts   Multiple embolic strokes on MRI 8/17 - R cerebellum, midbrain, multiple other tiny embolic infarcts.   Dementia   MSSA bacteremia, r/o endocarditis - on Daptomycin  Acute popliteal DVT on hep gtt    Recommendations   [] New CTH from 9/26 without any evidence of acute infarct -> can consider repeat CTH down the line in mental status does not improve with treatment of underlying metabolic derangements and infections  []On ABx for MSSA bacteremia, possble HSV keratitis  [] repeat TTE or consider STEPHANIE to rule out endocarditis   [] GI following for coffee ground emesis - esophagitis seen on colonoscopy, monitor for bleeding now that on heparin gtt  [] family declined kidney biopsy for AIN -> s/p steroid tx   [] C/w home ASA 81 mg if no contraindications   [] hep gtt resumed, ok to target normal PTT, low suspicion for new ischmia   [] C/w home Atorvastatin 10 mg qhs   [] would interrogate ILR and review tele to see if pAF -. no AF seen  [] DVT/GI ppx - hep gtt   [] Neurochecks q4hr   [] BP goals: Normotension - on pressors  [] PT/OT when able   [] HgA1C goals < 7.0   [] continue to address GOC with family    D/w daughter at bedside and MICU PA    Katty Charles DO  Vascular Neurology  Office [phone]

## 2023-09-27 NOTE — PROGRESS NOTE ADULT - ASSESSMENT
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 While in MICU patient noted with worsening renal failure requiring HD initiation, CGE/ Melena s/p EGD 8/31 found with esophagitis and erosive gastropathy, new right cerebellar infarct and fevers second to ESBL COLI and MSSA VAP, MSSA bacteremia, left ear otitis externa and drug rxn to cephalosporins Course further complicated by prolonged vent time s/p tracheostomy 9/1 and transferred to RCU 9/3 completed by recurrent fevers with high PIP, respiratory acidosis and concern for volume overloaded.     NEUROLOGY  # Metabolic Encephalopath vs NEW cerebella infarct   - Baseline MS AOX3 with short term memory changes per family however noted with concern for poor mentation in ICU  - MRI (8/17) with NEW right cerebellar infarct and old left PCA/occipital infarcts  - STEPHANIE (8/17) with AV showing two linear mobile echogenic elements attached to valve leaflets consistent with Lambel's excrescence, but no TRINITY thrombus, and lambel's excrescence with uncertain clinical implication.   - EEG (8/11-8/15) with no seizures   - ILR interrogation (9/7) with SR and PACs falsely recorded as AF.   - Attempted to resume ASA for medical management but held given GIB   - Continue on Lipitor for medical management   - Course complicated by questionable head and body shaking 9/8 however prolactin elevated and shakes head yes/no to some questions.   - Lethargy noted - Pending Rpt CT Head- new left parietal infarct    CARDIOVASCULAR  # HTN Urgency   # Septic vs vasoplegic shock   - Labile BPs ranging in between SBP 80s-200s and attempted labetalol, however noted with hypotension and bradycardia likely from BB toxicity vs shock state?    - Holding Labetalol and Catapres   - c/w Cardura for now   - Hypotensive in the 80s systolic overnight, requiring Midodrine 20mg x 1 (resolved)   - Stable today, still mildly hypotensive, but no intervention at this time     # Hx of HFpEF with likely ADHF with volume overload.   - ECHO (7/2023) with EF 59 with severe LVH and diastolic dysfunction   - STEPHANIE (8/18) with normal LVRVSF however noted with increased LA pressures and persistent LA septal bowing into RA.   - ECHO (8/11) with EF 60 with mild LVH, normal LVRVSF, and mild ddfxn.  - Remove volume with HD sessions and intermittent bumex pushes as BP allows    - s/p Bumex 2mg IV BID for now  - Will start Bumex gtt at 0.5mg/hr - Monitor UOP and electrolytes- Bumex dc'd    HEENT   # Left ear OE   - ENT evaluation (9/7) with no mastoid tenderness, however found with positive swelling and excoriation to left auricle and tenderness to manipulation of auricle. Debrided crusting of auricle and EAC evaluated with edema and unable to visualize TM. EAC debrided and found with watery exudate.   - s/p CiproHC (9/5 - 9/16)   - Bradford discontinued. Rash on torso concerning for possible drug rash  - ENT following and ear wick change QD   - Wick out but needs ? Debridement wound care called/fu ent   - ENT recommending CT IAC w/ IV con but family is refusing as concerned given CKD, kidneys wouldn't recover from IV contrast. Spoke with Nephrology, ENT, and patient's  at length. Planned for CT non con and per , decision will be made from there but for now doesn't want to risk further harming kidneys.  - CT IAC showing acute on chronic left-sided otitis externa and acute on chronic left-sided otomastoiditis. Superimposed left-sided tympanosclerosis. Superimposed cholesteatoma formation within the left tympanomastoid cavity cannot be excluded. No evidence of malignant otitis externa.  - ENT consulted (9/20) for white purulent discharge from left ear, recc: ENT:  acetic acid drops BID to left ear. Mupirocin ointment to the left pinna BID, cover with xeroform after placing mupirocin ointment. Pressure offloading of the left ear.  + L eye redness in setting of surrounding infectious process - Ophthalmology consulted via team, recs pending     # Oral Lesions with questionable Zoster vs Trauma?   - Patient with tongue pain and seen with anterior ulcerations consolidating and crusting and posterior ulceration.  - Case discussed with pathology resident and culture/ cytology sent.   - HSV negative and Path (9/6) with normal appearing epithelial cells singly and in clusters devoid of viral cytopathic effect.   - Continue on magic mouth wash for pain    RESPIRATORY  # AHHRF second to volume overload   - Hx of CKD and HFpEF presented with SOB and hypercarbia second to volume overload requiring intubation and HD initiation   - Vent weaning attempted however limited requiring tracheostomy (9/1) with IP   - Course complicated by PIPs elevated (50s) and respiratory acidosis second to secretions vs volume ?    - Vent adjusted 20/300/5/30 without improvement.   - POCUS (9/8) with BL pleural effusions (large on right and small on left)   - CT CHEST (9/8) with TBM, BL pleural effusions and continued consolidations   - Continue on vent and given TBM increased PEEP (9/9) to 20/300/8/40 with overall improvement   - Continue on Duoneb and HTS for airway clearance   - Continue volume removal with diuresis and aggressive UF sessions.   - Discussing possible thora but appears improved and will monitor.  ]  - Bedside US showing decrease in pleural effusion - hold off thoracentesis 9/10     GI  # UGIB   - CGE x 1 episode on 8/24 and melena x 2 episodes on 8/31 likely residual blood.   - EGD (8/31) found with esophagitis and erosive gastropathy  - Continue on PPI BID through late October and then PPI QD   - No melena     # Dysphagia   - NGT-TF continued   - Pending PEG however needs improvement in infectious status prior to placement.     RENAL  # Progressive CKD   - Presented volume overload and progressive RUSS on CKD (baseline CRE in 2s and mode into the 3s on admission) likely obstruction vs low flow state with shock vs AIN from drug reaction issue?  - POCUS with no signs of hydronephrosis   - Pressor support started with improvement in renal function  - Attempted steroid course in ICU without improvement in renal function   - Case discussed at length with renal and initiated on HD in ICU and now continued on HD TTHS, however remains volume overloaded.   - Nephro following - Plan to resume HD TIW - Nephro recc holding HD alissa (9/21)   - Monitor renal function and UOP, and electrolytes for now  - Monitor urine output - Will perform bladder scan/straight cath as needed if retaining urine   - Nephro recs to start on Bumex gtt - Will monitor BMP closely     Hyponatremia (improving) Na 123>>125  - c/w Salt tabs - s/p Hypertonic 1.5%NS @ 50cc/hr x 5 hr  - Renal- no plan for HD at this time - Plan to start on Bumex gtt    # Hyperphosphatemia (resolved)   - PTH normal and elevated phos likely from renal failure  - s/p Phos binder with Renvela - now d'beth as level wnl      INFECTIOUS DISEASE  # ESBL ECOLI and MSSA VAP c/b MSSA bacteremia   - RVP/ COVID (8/11) negative   - SCx (8/11) with MSSA s/p cefepime (8/12-8/13) and then ancef (8/14) however noted with drug reaction and then changed to vanco and aztreonam (8/12-8/13) in ICU .   - Course complicated by recurrent fevers and return of MSSA with bacteremia.   - SCx (9/1) with MSSA and ESBL ECOLI   - BAL (9/1) with MSSA   - BCx (9/1) with MSSA and cleared on (9/4)   - ECHO (9/6) unable to rule out endocarditis   - Continue on Bradford (9/4 - ) and Vanco BY DOSE POST HD (9/4, 9/7, 9/9 ...) with duration TBD at this time.   - Given inability to rule out endocarditis will discuss possible 4 wks with ID?   - Course complicated by fever (9/7) and UA with leuks but no bacteria, however compared to prior improved, RVP/COVID and SCx negative, and RPT CXR similar to prior   - LE DOPPLERS- Blood and sputum cx NTD; Acute left deep vein thrombosis of the left popliteal vein.  - Febrile overnight 102 recultured and sent off   -Pt receiving vanco post HD however today given vanco 750mg x 1 will check Vanco level at end of HD session and dose   - BCx (9/20): sent- NTD  - ID recc CT C/A/P w/ IV contrast - on hold d/t unsure if pt tolerate HD alissa  - C/w fluconazole 200 qd (9/21)    # Left ear OE   - ENT evaluation (9/7) with no mastoid tenderness, however found with positive swelling and excoriation to left auricle and tenderness to manipulation of auricle. Debrided crusting of auricle and EAC evaluated with edema and unable to visualize TM. EAC debrided and found with watery exudate.   - s/p CiproHC (9/5 - 9/16)     # MRSA PCRs   - MRSA PCR (8/11 and 8/14) negative   - Next MRSA + PCR ordered for 10/8     HEME  # Anemia second to GIB vs renal disease   - Hold chemical DVT PPX given bleeding/ waxing and waning HH   - s/p multiple units of PRBCs (8/15, 8/21, 8/28, 8/31 and 9/8)   - Anemia panel with AOCD but with low %sat and ferrous sulfate added to optimize   - Monitor HH and discuss with renal for EPO sometime next week.   - Spoke with GI for clearance to start Heparin gtt for + DVT   - c/w Heparin gtt (9/21)    Vascular  # DVT  - Pt with + popliteal DVT on SCD Spoke with GI no absolute contraindication for starting Heparin - advise close monitoring for bleeding - Do stat CTA if bleeding occurs and call IR   - Pt specific heparin gtt started with goal PTT 60-85  - will monitor closely   - c/w Heparin gtt (9/21)    ONC   # Hx of Breast CA   - Patient dx in 2018 and s/p BL mastectomy (radical on right) and chemo and RT.   - Supportive care.     ENDOCRINE  # IDDM2   - Continue on NPH 3U and ISS   - Monitor FS and adjust as needed     LINES/ TUBES  - R IJ CLAUDIA (8/19 - 9/21)     SKIN  # Drug Eruption   - Attempted cefepime (8/12) vs ancef (8/13-8/14) and then noted with drug rxn.   - Hold further cephalosporins at this time   - s/p Steroids with prednisone 40 (8/18 - 9/2) then prednisone 30 (9/3-9/7), then prednisone 20 (9/8 - 9/10), then prednisone 10mg (9/11-9/13) then turn off.     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 While in MICU patient noted with worsening renal failure requiring HD initiation, CGE/ Melena s/p EGD 8/31 found with esophagitis and erosive gastropathy, new right cerebellar infarct and fevers second to ESBL COLI and MSSA VAP, MSSA bacteremia, left ear otitis externa and drug rxn to cephalosporins Course further complicated by prolonged vent time s/p tracheostomy 9/1 and transferred to RCU 9/3 completed by recurrent fevers with high PIP, respiratory acidosis and concern for volume overloaded.     NEUROLOGY  # Metabolic Encephalopath vs NEW cerebella infarct   - Baseline MS AOX3 with short term memory changes per family however noted with concern for poor mentation in ICU  - MRI (8/17) with NEW right cerebellar infarct and old left PCA/occipital infarcts  - STEPHANIE (8/17) with AV showing two linear mobile echogenic elements attached to valve leaflets consistent with Lambel's excrescence, but no TRINITY thrombus, and lambel's excrescence with uncertain clinical implication.   - EEG (8/11-8/15) with no seizures   - ILR interrogation (9/7) with SR and PACs falsely recorded as AF.   - Attempted to resume ASA for medical management but held given GIB   - Continue on Lipitor for medical management   - Course complicated by questionable head and body shaking 9/8 however prolactin elevated and shakes head yes/no to some questions.   - rCTH 9/26- Vague questionable areas of hypoattenuation within the bilateral cerebellar hemispheres which may be compatible with acute/subacute ischemia. Recommend further evaluation with a brain MRI study, provided there are no MRI contraindications. No acute intracranial hemorrhage. Previously seen questionable vague wedge-shaped area of hypoattenuation in the left parietal lobe with artifactual secondary to motion and volume averaging with a prominent sulcus. Chronic left occipital lobe infarct.  - f/up neuro consult recs     CARDIOVASCULAR  # HTN Urgency   # Septic vs vasoplegic shock   - continue levophed, goal SBP >100  - Holding Labetalol , Catapres , Cardura   - started stress does steroids 9/27 -      # Hx of HFpEF with likely ADHF with volume overload.   - ECHO (7/2023) with EF 59 with severe LVH and diastolic dysfunction   - STEPHANIE (8/18) with normal LVRVSF however noted with increased LA pressures and persistent LA septal bowing into RA.   - ECHO (8/11) with EF 60 with mild LVH, normal LVRVSF, and mild ddfxn.  - Remove volume with HD sessions and intermittent bumex pushes as BP allows    - s/p Bumex 2mg IV BID/bumex gtt, ordered for bumex 2mg q6hrs w/ minimal improvement in UOP. plan for HD today/    HEENT   # Left ear OE   - ENT evaluation (9/7) with no mastoid tenderness, however found with positive swelling and excoriation to left auricle and tenderness to manipulation of auricle. Debrided crusting of auricle and EAC evaluated with edema and unable to visualize TM. EAC debrided and found with watery exudate.   - s/p CiproHC (9/5 - 9/16)   - Bradford discontinued. Rash on torso concerning for possible drug rash  - ENT following and ear wick change QD   - Wick out but needs ? Debridement wound care called/fu ent   - ENT recommending CT IAC w/ IV con but family is refusing as concerned given CKD, kidneys wouldn't recover from IV contrast. Spoke with Nephrology, ENT, and patient's  at length. Planned for CT non con and per , decision will be made from there but for now doesn't want to risk further harming kidneys.  - CT IAC showing acute on chronic left-sided otitis externa and acute on chronic left-sided otomastoiditis. Superimposed left-sided tympanosclerosis. Superimposed cholesteatoma formation within the left tympanomastoid cavity cannot be excluded. No evidence of malignant otitis externa.  - ENT consulted (9/20) for white purulent discharge from left ear, recc: ENT:  acetic acid drops BID to left ear. Mupirocin ointment to the left pinna BID, cover with xeroform after placing mupirocin ointment. Pressure offloading of the left ear.  + L eye redness in setting of surrounding infectious process - Ophthalmology consulted via team, recs pending     # Oral Lesions with questionable Zoster vs Trauma?   - Patient with tongue pain and seen with anterior ulcerations consolidating and crusting and posterior ulceration.  - Case discussed with pathology resident and culture/ cytology sent.   - HSV negative and Path (9/6) with normal appearing epithelial cells singly and in clusters devoid of viral cytopathic effect.   - Continue on magic mouth wash for pain    RESPIRATORY  # Acute hypoxic/hypercapnic Respiratory failure likely iso volume overload   - Hx of CKD and HFpEF presented with SOB and hypercarbia second to volume overload requiring intubation and HD initiation   - Vent weaning attempted however limited requiring tracheostomy (9/1) with IP   - Course complicated by PIPs elevated (50s) and respiratory acidosis second to secretions vs volume ?    - Vent settings: 20/300/10/60%  - ABG - ( 27 Sep 2023 12:50 ) pH, Arterial: 7.19  pH, Blood: x     /  pCO2: 64    /  pO2: 90    / HCO3: 24    / Base Excess: -4.0  /  SaO2: 98.9    - POCUS (9/8) with BL pleural effusions (large on right and small on left)   - CT CHEST (9/8) with TBM, BL pleural effusions and continued consolidations   - Continue on Duoneb and HTS for airway clearance   - Continue volume removal , plan for HD today  - consider Thoracentesis if oxygenation does not improve w/ fluid removal     GI  # UGIB   - CGE x 1 episode on 8/24 and melena x 2 episodes on 8/31 likely residual blood.   - EGD (8/31) found with esophagitis and erosive gastropathy  - Continue on PPI BID through late October and then PPI QD   - No melena     # Dysphagia   - NGT-TF continued   - Pending PEG however needs improvement in infectious status prior to placement.     RENAL  # Progressive CKD   - Presented volume overload and progressive RUSS on CKD (baseline CRE in 2s and mode into the 3s on admission) likely obstruction vs low flow state with shock vs AIN from drug reaction issue?  - POCUS with no signs of hydronephrosis   - Pressor support started with improvement in renal function  - Attempted steroid course in ICU without improvement in renal function   - Case discussed at length with renal and initiated on HD in ICU and now continued on HD TTHS, however remains volume overloaded.   - Nephro following - Plan to resume HD TIW - Nephro recc holding HD alissa (9/21)   - Monitor renal function and UOP, and electrolytes for now  - Monitor urine output - Will perform bladder scan/straight cath as needed if retaining urine   - Nephro recs to start on Bumex gtt - Will monitor BMP closely     Hyponatremia (improving) Na 123>>125  - c/w Salt tabs - s/p Hypertonic 1.5%NS @ 50cc/hr x 5 hr  - Renal- no plan for HD at this time - Plan to start on Bumex gtt    # Hyperphosphatemia (resolved)   - PTH normal and elevated phos likely from renal failure  - s/p Phos binder with Renvela - now d'beth as level wnl      INFECTIOUS DISEASE  # ESBL ECOLI and MSSA VAP c/b MSSA bacteremia   - RVP/ COVID (8/11) negative   - SCx (8/11) with MSSA s/p cefepime (8/12-8/13) and then ancef (8/14) however noted with drug reaction and then changed to vanco and aztreonam (8/12-8/13) in ICU .   - Course complicated by recurrent fevers and return of MSSA with bacteremia.   - SCx (9/1) with MSSA and ESBL ECOLI   - BAL (9/1) with MSSA   - BCx (9/1) with MSSA and cleared on (9/4)   - ECHO (9/6) unable to rule out endocarditis   - Continue on Bradford (9/4 - ) and Vanco BY DOSE POST HD (9/4, 9/7, 9/9 ...) with duration TBD at this time.   - Given inability to rule out endocarditis will discuss possible 4 wks with ID?   - Course complicated by fever (9/7) and UA with leuks but no bacteria, however compared to prior improved, RVP/COVID and SCx negative, and RPT CXR similar to prior   - LE DOPPLERS- Blood and sputum cx NTD; Acute left deep vein thrombosis of the left popliteal vein.  - Febrile overnight 102 recultured and sent off   -Pt receiving vanco post HD however today given vanco 750mg x 1 will check Vanco level at end of HD session and dose   - BCx (9/20): sent- NTD  - ID recc CT C/A/P w/ IV contrast - on hold d/t unsure if pt tolerate HD alissa  - C/w fluconazole 200 qd (9/21)    # Left ear OE   - ENT evaluation (9/7) with no mastoid tenderness, however found with positive swelling and excoriation to left auricle and tenderness to manipulation of auricle. Debrided crusting of auricle and EAC evaluated with edema and unable to visualize TM. EAC debrided and found with watery exudate.   - s/p CiproHC (9/5 - 9/16)     # MRSA PCRs   - MRSA PCR (8/11 and 8/14) negative   - Next MRSA + PCR ordered for 10/8     HEME  # Anemia second to GIB vs renal disease   - Hold chemical DVT PPX given bleeding/ waxing and waning HH   - s/p multiple units of PRBCs (8/15, 8/21, 8/28, 8/31 and 9/8)   - Anemia panel with AOCD but with low %sat and ferrous sulfate added to optimize   - Monitor HH and discuss with renal for EPO sometime next week.   - Spoke with GI for clearance to start Heparin gtt for + DVT   - c/w Heparin gtt (9/21)    Vascular  # DVT  - Pt with + popliteal DVT on SCD Spoke with GI no absolute contraindication for starting Heparin - advise close monitoring for bleeding - Do stat CTA if bleeding occurs and call IR   - Pt specific heparin gtt started with goal PTT 60-85  - will monitor closely   - c/w Heparin gtt (9/21)    ONC   # Hx of Breast CA   - Patient dx in 2018 and s/p BL mastectomy (radical on right) and chemo and RT.   - Supportive care.     ENDOCRINE  # IDDM2   - Continue on NPH 3U and ISS   - Monitor FS and adjust as needed     LINES/ TUBES  - R IJ SHILEY (8/19 - 9/21)     SKIN  # Drug Eruption   - Attempted cefepime (8/12) vs ancef (8/13-8/14) and then noted with drug rxn.   - Hold further cephalosporins at this time   - s/p Steroids with prednisone 40 (8/18 - 9/2) then prednisone 30 (9/3-9/7), then prednisone 20 (9/8 - 9/10), then prednisone 10mg (9/11-9/13) then turn off.     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 While in MICU patient noted with worsening renal failure requiring HD initiation, CGE/ Melena s/p EGD 8/31 found with esophagitis and erosive gastropathy, new right cerebellar infarct and fevers second to ESBL COLI and MSSA VAP, MSSA bacteremia, left ear otitis externa and drug rxn to cephalosporins Course further complicated by prolonged vent time s/p tracheostomy 9/1 and transferred to RCU 9/3 completed by recurrent fevers with high PIP, respiratory acidosis and concern for volume overloaded.     NEUROLOGY  # Metabolic Encephalopath vs NEW cerebella infarct   - Baseline MS AOX3 with short term memory changes per family however noted with concern for poor mentation in ICU  - MRI (8/17) with NEW right cerebellar infarct and old left PCA/occipital infarcts  - STEPHANIE (8/17) with AV showing two linear mobile echogenic elements attached to valve leaflets consistent with Lambel's excrescence, but no TRINITY thrombus, and lambel's excrescence with uncertain clinical implication.   - EEG (8/11-8/15) with no seizures   - ILR interrogation (9/7) with SR and PACs falsely recorded as AF.   - Attempted to resume ASA for medical management but held given GIB   - Continue on Lipitor for medical management   - Course complicated by questionable head and body shaking 9/8 however prolactin elevated and shakes head yes/no to some questions.   - rCTH 9/26- Vague questionable areas of hypoattenuation within the bilateral cerebellar hemispheres which may be compatible with acute/subacute ischemia. Recommend further evaluation with a brain MRI study, provided there are no MRI contraindications. No acute intracranial hemorrhage. Previously seen questionable vague wedge-shaped area of hypoattenuation in the left parietal lobe with artifactual secondary to motion and volume averaging with a prominent sulcus. Chronic left occipital lobe infarct.  - f/up neuro consult recs     CARDIOVASCULAR  # HTN Urgency   # Septic vs vasoplegic shock   - continue levophed, goal SBP >100  - Holding Labetalol , Catapres , Cardura   - started stress does steroids 9/27 -      # Hx of HFpEF with likely ADHF with volume overload.   - ECHO (7/2023) with EF 59 with severe LVH and diastolic dysfunction   - STEPHANIE (8/18) with normal LVRVSF however noted with increased LA pressures and persistent LA septal bowing into RA.   - ECHO (8/11) with EF 60 with mild LVH, normal LVRVSF, and mild ddfxn.  - Remove volume with HD sessions and intermittent bumex pushes as BP allows    - s/p Bumex 2mg IV BID/bumex gtt, ordered for bumex 2mg q6hrs w/ minimal improvement in UOP. -  plan for HD today    HEENT   # Left ear OE   - ENT evaluation (9/7) with no mastoid tenderness, however found with positive swelling and excoriation to left auricle and tenderness to manipulation of auricle. Debrided crusting of auricle and EAC evaluated with edema and unable to visualize TM. EAC debrided and found with watery exudate.   - s/p CiproHC (9/5 - 9/16)   - Bradford discontinued. Rash on torso concerning for possible drug rash  - ENT following and ear wick change QD   - Wick out but needs ? Debridement wound care called/fu ent   - ENT recommending CT IAC w/ IV con but family is refusing as concerned given CKD, kidneys wouldn't recover from IV contrast. Spoke with Nephrology, ENT, and patient's  at length. Planned for CT non con and per , decision will be made from there but for now doesn't want to risk further harming kidneys.  - CT IAC showing acute on chronic left-sided otitis externa and acute on chronic left-sided otomastoiditis. Superimposed left-sided tympanosclerosis. Superimposed cholesteatoma formation within the left tympanomastoid cavity cannot be excluded. No evidence of malignant otitis externa.  - ENT consulted (9/20) for white purulent discharge from left ear, recc: ENT:  acetic acid drops BID to left ear. Mupirocin ointment to the left pinna BID, cover with xeroform after placing mupirocin ointment. Pressure offloading of the left ear.  + L eye redness in setting of surrounding infectious process - Ophthalmology consulted via team, recs pending     # Oral Lesions with questionable Zoster vs Trauma?   - Patient with tongue pain and seen with anterior ulcerations consolidating and crusting and posterior ulceration.  - Case discussed with pathology resident and culture/ cytology sent.   - HSV negative and Path (9/6) with normal appearing epithelial cells singly and in clusters devoid of viral cytopathic effect.   - Continue on magic mouth wash for pain    RESPIRATORY  # Acute hypoxic/hypercapnic Respiratory failure likely iso volume overload   - Hx of CKD and HFpEF presented with SOB and hypercarbia second to volume overload requiring intubation and HD initiation   - Vent weaning attempted however limited requiring tracheostomy (9/1) with IP   - Course complicated by PIPs elevated (50s) and respiratory acidosis second to secretions vs volume ?    - Vent settings: 20/300/10/60%  - ABG - ( 27 Sep 2023 12:50 ) pH, Arterial: 7.19  pH, Blood: x     /  pCO2: 64    /  pO2: 90    / HCO3: 24    / Base Excess: -4.0  /  SaO2: 98.9    - POCUS (9/8) with BL pleural effusions (large on right and small on left)   - CT CHEST (9/8) with TBM, BL pleural effusions and continued consolidations   - (9/27) Over the past 24 hrs, pt with increasing O2 requirements and respiratory acidosis with poor TVs. Bedside bronchoscopy (+) severe dynamic airway collapse into distal end of tracheostomy extending to main ines and RM bronchus.  tracheostomy exchanged at bedside to size 6dXLT with repeat bronchoscopy showing some but not complete improvement in dynamic collapse.   - Continue volume removal , plan for HD today  - consider Thoracentesis if oxygenation does not improve w/ fluid removal  - Continue on Duoneb and HTS for airway clearance        GI  # UGIB   - CGE x 1 episode on 8/24 and melena x 2 episodes on 8/31 likely residual blood.   - EGD (8/31) found with esophagitis and erosive gastropathy  - Continue on PPI BID through late October and then PPI QD   - No melena     # Dysphagia   - NGT-TF continued , changed to R nostril today given infections on L side  - Pending PEG however needs improvement in infectious status prior to placement.     RENAL  # Progressive CKD   - Presented volume overload and progressive RUSS on CKD (baseline CRE in 2s and mode into the 3s on admission) likely obstruction vs low flow state with shock vs AIN from drug reaction issue?  - POCUS with no signs of hydronephrosis   - Pressor support started with improvement in renal function  - Attempted steroid course in ICU without improvement in renal function   - HD inititated in ICU for volume overload, held 9/20 iso malfunctioning shiley and stable labs/vol status   - now w/ worsening hypoxia/respiratory acidosis today, L IJ shiley placed 9/27 . d/w nephro , will attempt HD today.  - Monitor renal function and UOP, and electrolytes for now  - Monitor urine output - willard placed      Hyponatremia   - c/w Salt tabs - s/p Hypertonic 1.5%NS @ 50cc/hr x 5 hr    # Hyperphosphatemia (resolved)   - PTH normal and elevated phos likely from renal failure  - s/p Phos binder with Renvela - now d'beth as level wnl      INFECTIOUS DISEASE  # ESBL ECOLI and MSSA VAP c/b MSSA bacteremia   - RVP/ COVID (8/11) negative   - SCx (8/11) with MSSA s/p cefepime (8/12-8/13) and then ancef (8/14) however noted with drug reaction and then changed to vanco and aztreonam (8/12-8/13) in ICU .   - Course complicated by recurrent fevers and return of MSSA with bacteremia.   - SCx (9/1) with MSSA and ESBL ECOLI   - BAL (9/1) with MSSA   - BCx (9/1) with MSSA and cleared on (9/4)   - ECHO (9/6) unable to rule out endocarditis   - Continue on Bradford (9/4 - ) and Vanco BY DOSE POST HD (9/4, 9/7, 9/9 ...) with duration TBD at this time.   - Given inability to rule out endocarditis will discuss possible 4 wks with ID?   - Course complicated by fever (9/7) and UA with leuks but no bacteria, however compared to prior improved, RVP/COVID and SCx negative, and RPT CXR similar to prior   - LE DOPPLERS- Blood and sputum cx NTD; Acute left deep vein thrombosis of the left popliteal vein.  - Febrile overnight 102 recultured and sent off   -Pt receiving vanco post HD however today given vanco 750mg x 1 will check Vanco level at end of HD session and dose   - BCx (9/20): sent- NTD  - ID recc CT C/A/P w/ IV contrast - on hold d/t unsure if pt tolerate HD alissa  - C/w fluconazole 200 qd (9/21)    # Left ear OE   - ENT evaluation (9/7) with no mastoid tenderness, however found with positive swelling and excoriation to left auricle and tenderness to manipulation of auricle. Debrided crusting of auricle and EAC evaluated with edema and unable to visualize TM. EAC debrided and found with watery exudate.   - s/p CiproHC (9/5 - 9/16)     # MRSA PCRs   - MRSA PCR (8/11 and 8/14) negative   - Next MRSA + PCR ordered for 10/8     HEME  # Anemia second to GIB vs renal disease   - Hold chemical DVT PPX given bleeding/ waxing and waning HH   - s/p multiple units of PRBCs (8/15, 8/21, 8/28, 8/31 and 9/8)   - Anemia panel with AOCD but with low %sat and ferrous sulfate added to optimize   - Monitor HH and discuss with renal for EPO sometime next week.   - Spoke with GI for clearance to start Heparin gtt for + DVT   - c/w Heparin gtt (9/21-)    Vascular  # DVT  - Pt with + popliteal DVT on SCD Spoke with GI no absolute contraindication for starting Heparin - advise close monitoring for bleeding - Do stat CTA if bleeding occurs and call IR   - Pt specific heparin gtt started with goal PTT 60-85  - will monitor closely   - c/w Heparin gtt (9/21)    ONC   # Hx of Breast CA   - Patient dx in 2018 and s/p BL mastectomy (radical on right) and chemo and RT.   - Supportive care.     ENDOCRINE  # IDDM2   - Continue on NPH 3U and ISS   - Monitor FS and adjust as needed   - started stress dose steroids 9/27, monitor FS     LINES/ TUBES  - R IJ SHILEY (8/19 - 9/21)   - L IJ shifabi (9/27 - )    SKIN  # Drug Eruption   - Attempted cefepime (8/12) vs ancef (8/13-8/14) and then noted with drug rxn.   - Hold further cephalosporins at this time   - s/p Steroids with prednisone 40 (8/18 - 9/2) then prednisone 30 (9/3-9/7), then prednisone 20 (9/8 - 9/10), then prednisone 10mg (9/11-9/13)        ETHICS/ GOC    - FULL CODE

## 2023-09-27 NOTE — PROGRESS NOTE ADULT - ASSESSMENT
72 y/o F well known to me from my [facility] outpt practice. she was admitted at [facility] 7/12-7/22 w aspiration PNA, was treated w CEFEPIME, developed an allergic rash,  dCHF, + MAC on AFB culture, had been progressively getting more and more lethargic and dyspneic at home since DC.   In  am of 8/11/23  ptn presented with respiratory distress w hypoxia and hypercarbia requiring intubation 2/2 volume overload +/- Asp PNA      Neuro   responds appropriately   Baseline MS AOx3, aphasic   - h/o CVA , on aspirin and statin . resumed w feeding tube, ASA resumed 8/26  - eeg  2/2 tremors, no sz focus  - less responsive in the past few days  - MRI 8/17:  new R cerebellar infarct, old left PCA/Occipital infarct. 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 Mcnulty, now in MICU  s/p septic shock overnight 9/1, now on meropenem, HDS  s/p trache, on vent support, copious secretions      Renal     Ptn well know to Dr. Colon,following   HD 8/19,  8/21, 8/26 and 8/28.  s/p PUF 8/29 2.5 Liters, HD 8/30,  9/1, 9/4  L IJ HD placed  uremic  hyponatremic  HD as per renal        Hypotension  - Levo drip  prognosis is poor  consider palliative care consult    GI  NPO  on tube feeds  coffee ground emesis x 1 8/24, melena overnight 8/31, s/p 1UPRBC, EGD/Colonoscopy: erosive gastropathy, esophagisits, on colonoscopy old blood seen no active bleeding, poor prep  family to decide re PEG placement    Endocrine  T2DM   A1C 7.1 in 7/2023   - BG goal 140-200     ID   No fever/leukocytosis, recheck temp   Hx latent TB which was treated, no concern for TB   - grew MAC on AFB culture from most recent admission. at [facility]  -MSSA Bacteremia 9/1, allergic to cephalosprins and PCN, on Vanco as per ID, renal dosing,   - sputum also grew E.Coli-ESBL, as per ID recs for  Bradford through 9/7( 1 week course as per ID) , afebrile.  - 9/8:  spike  temp 38.1C, has O. externa, has a wick, recs are to get a CT to R/O an abscess/bony involvement. cont Meropenem  9/9: ptn w fever overnight to 100.8,  may need shiley pulled if bacteremic, needs NECK CT as per ENT to R/O Mastoid bone involvement while having ongoing purulent DC from L ear 2/2 O. externa, getting daily wick changes  9/10:  spiked 102 overnight through Bradford and Vanco, purulent DC from O. externa, ENT recommend Ct of mastoid. ptn in HD, has Shiley in place, consider pulling shiley and send for Cx. would d/w Renal, and consider contacting  ID, last seen by Dr. Conner 9/6 9/11: remains febrile, Dr. Conner reconsulted. cont Bradford, Vanco  9/12: tmax 100.5, fever curve is improving, awaiting Left ear CT, on Bradford since 9/1. on  vanco for MSSA Bacteremia, does not tolerate cephalosporins. through 10/2  9/13: on full vent support via trache, appears uncomfortable and onur 2/2 Left O. externa. has an incidental left middle ear tumor, no acute middle ear interventions recommended, left ear wick replaced. on Meropenem, cx from Ear DC is neg. On Vanco for MSSA bacteremia through 10/2, course of Meropenem as per ID, afebrile in the past 24 hrs . Cardura for HTN resumed, HGB dropped to 6.4, got a dose of EPO and 1UPRBC, rpt 7.8, remains on HEPARIN drip for LEFT popliteal DVT  9/14: cont on Mupirocin locally to Left ear, cont IV Bradford, ENT signed off, afebrile x 48 hrs, Mro course to be determined by ID, on Vanco for MSSA bacteremia through 10/2. ongoing worsening hyponatremia, Hypertonic saline as per renal, no HD today, s/p 1UPRBC 9/13  9/15: spiking temps again, if cont to spike as per ID re PAN scan. cont Vanco for MSSA Bacteremia  9/16-18: no temp overnight  afebrile, cont to have Left ear DC,   on Vanco and ,bradford through 10/2  On IV Fluconazole for fungal cx+ from O. externa Discharge.   fluconazole started 9/21, ptn developed an allergic rash on 9/22. doubt its 2/2 to MEROPENEM or Vanco.  MEropenem was DCed 2/2 causing possible drug rash.  on VANCO based on levels for MSSA bacteremia but DCed 2/2 poss drug rash, now on Daptomycin      Heme/Onc  ppx: place on SCD  Transfuse for hgb 6.4, no obv bleed. HDS  LEFT POPLITEAL VEIN ACUTE DVT on 9/10, on Heparin drip    Ethics  GOC - Discussed GOC with daughter and , they have opted in the past for full code. and she remains full code at present      9/27:  In [place], R IJ HD catheter placed, NGT in place, acidotic on VBG, trache to vent, anasarca, on IV BUMEX, on Levo, on Heparin drip, on stress dose HYdrocortisone, FS in range, uremic, awaiting HD, CT C/A/P w no acute findings. VANCO Dced , now on Dapto, for MSSA baceteremia through 10/2

## 2023-09-27 NOTE — PROGRESS NOTE ADULT - SUBJECTIVE AND OBJECTIVE BOX
[facility] DEPARTMENT OF OPHTHALMOLOGY  ------------------------------------------------------------------------------  Latricia France MD, PGY-3  Contact: TEAMS  ------------------------------------------------------------------------------    Interval History: No acute events overnight.     MEDICATIONS  (STANDING):  acetic acid 0.25% Topical Irrigation 1 Application(s) Topical two times a day  albuterol/ipratropium for Nebulization 3 milliLiter(s) Nebulizer every 6 hours  atorvastatin 40 milliGRAM(s) Oral at bedtime  buMETAnide Injectable 2 milliGRAM(s) IV Push <User Schedule>  chlorhexidine 0.12% Liquid 15 milliLiter(s) Oral Mucosa every 12 hours  chlorhexidine 2% Cloths 1 Application(s) Topical daily  DAPTOmycin IVPB 500 milliGRAM(s) IV Intermittent every 48 hours  dexMEDEtomidine Infusion 0.2 MICROgram(s)/kG/Hr (3.33 mL/Hr) IV Continuous <Continuous>  dextrose 5%. 1000 milliLiter(s) (100 mL/Hr) IV Continuous <Continuous>  dextrose 5%. 1000 milliLiter(s) (50 mL/Hr) IV Continuous <Continuous>  dextrose 50% Injectable 25 Gram(s) IV Push once  dextrose 50% Injectable 25 Gram(s) IV Push once  dextrose 50% Injectable 12.5 Gram(s) IV Push once  epoetin odalis (EPOGEN) Injectable 84589 Unit(s) SubCutaneous <User Schedule>  ferrous    sulfate Liquid 300 milliGRAM(s) Oral daily  fluconAZOLE IVPB 200 milliGRAM(s) IV Intermittent every 24 hours  glucagon  Injectable 1 milliGRAM(s) IntraMuscular once  heparin  Infusion 1050 Unit(s)/Hr (10.5 mL/Hr) IV Continuous <Continuous>  hydrocortisone sodium succinate Injectable 50 milliGRAM(s) IV Push every 8 hours  insulin lispro (ADMELOG) corrective regimen sliding scale   SubCutaneous every 6 hours  insulin NPH human recombinant 3 Unit(s) SubCutaneous every 6 hours  midodrine. 20 milliGRAM(s) Oral three times a day  mupirocin 2% Ointment 1 Application(s) Topical two times a day  norepinephrine Infusion 0.06 MICROgram(s)/kG/Min (7.48 mL/Hr) IV Continuous <Continuous>  ofloxacin 0.3% Solution 1 Drop(s) Left EYE every 2 hours  pantoprazole  Injectable 40 milliGRAM(s) IV Push two times a day  petrolatum Ophthalmic Ointment 1 Application(s) Right EYE two times a day  psyllium Powder 1 Packet(s) Oral daily  sodium chloride 2 Gram(s) Oral two times a day    MEDICATIONS  (PRN):  acetaminophen   Oral Liquid .. 650 milliGRAM(s) Oral every 6 hours PRN Temp greater or equal to 38C (100.4F), Mild Pain (1 - 3)  artificial tears (preservative free) Ophthalmic Solution 1 Drop(s) Both EYES three times a day PRN Dry Eyes  calamine/zinc oxide Lotion 1 Application(s) Topical two times a day PRN Rash and/or Itching  dextrose Oral Gel 15 Gram(s) Oral once PRN Blood Glucose LESS THAN 70 milliGRAM(s)/deciliter      VITALS: T(C): 36 (09-27-23 @ 08:00)  T(F): 96.8 (09-27-23 @ 08:00), Max: 98.9 (09-26-23 @ 14:20)  HR: 105 (09-27-23 @ 11:17) (89 - 112)  BP: 122/37 (09-27-23 @ 11:00) (90/24 - 131/30)  RR:  (16 - 29)  SpO2:  (89% - 100%)  Wt(kg): --  General: AAO x 3, appropriate mood and affect    Pen Light Exam (PLE)  External: Flat OU  Lids/Lashes/Lacrimal Ducts: Flat OU    Sclera/Conjunctiva: W+Q OD. Conjunctivochalasis OU. Subconjunctival hemorrhage and injection OS. 1+ chemosis OS.   Cornea: Cl OD. D-folds diffuse OS  Anterior Chamber: D+F OU. No hypopyon OS.   Iris: Flat OU  Lens: Cl OU    Fundus Exam: dilated with 1% tropicamide and 2.5% phenylephrine  Approval obtained from primary team for dilation  Patient aware that pupils can remained dilated for at least 4-6 hours  Exam performed with 20D lens    Vitreous: wnl OU  Disc, cup/disc: sharp and pink, 0.6 OU  Macula: Flat OU  Vessels: Normal caliber OU  Periphery: Diabetic retinopathy OU.    Labs/Imaging:  No ophtho imaging.    MSSA in blood culture on 9/1/23, blood cultures now clear.    [facility] DEPARTMENT OF OPHTHALMOLOGY  ------------------------------------------------------------------------------  Latricia France MD, PGY-3  Contact: TEAMS  ------------------------------------------------------------------------------    Interval History: No acute events overnight.     MEDICATIONS  (STANDING):  acetic acid 0.25% Topical Irrigation 1 Application(s) Topical two times a day  albuterol/ipratropium for Nebulization 3 milliLiter(s) Nebulizer every 6 hours  atorvastatin 40 milliGRAM(s) Oral at bedtime  buMETAnide Injectable 2 milliGRAM(s) IV Push <User Schedule>  chlorhexidine 0.12% Liquid 15 milliLiter(s) Oral Mucosa every 12 hours  chlorhexidine 2% Cloths 1 Application(s) Topical daily  DAPTOmycin IVPB 500 milliGRAM(s) IV Intermittent every 48 hours  dexMEDEtomidine Infusion 0.2 MICROgram(s)/kG/Hr (3.33 mL/Hr) IV Continuous <Continuous>  dextrose 5%. 1000 milliLiter(s) (100 mL/Hr) IV Continuous <Continuous>  dextrose 5%. 1000 milliLiter(s) (50 mL/Hr) IV Continuous <Continuous>  dextrose 50% Injectable 25 Gram(s) IV Push once  dextrose 50% Injectable 25 Gram(s) IV Push once  dextrose 50% Injectable 12.5 Gram(s) IV Push once  epoetin odalis (EPOGEN) Injectable 10239 Unit(s) SubCutaneous <User Schedule>  ferrous    sulfate Liquid 300 milliGRAM(s) Oral daily  fluconAZOLE IVPB 200 milliGRAM(s) IV Intermittent every 24 hours  glucagon  Injectable 1 milliGRAM(s) IntraMuscular once  heparin  Infusion 1050 Unit(s)/Hr (10.5 mL/Hr) IV Continuous <Continuous>  hydrocortisone sodium succinate Injectable 50 milliGRAM(s) IV Push every 8 hours  insulin lispro (ADMELOG) corrective regimen sliding scale   SubCutaneous every 6 hours  insulin NPH human recombinant 3 Unit(s) SubCutaneous every 6 hours  midodrine. 20 milliGRAM(s) Oral three times a day  mupirocin 2% Ointment 1 Application(s) Topical two times a day  norepinephrine Infusion 0.06 MICROgram(s)/kG/Min (7.48 mL/Hr) IV Continuous <Continuous>  ofloxacin 0.3% Solution 1 Drop(s) Left EYE every 2 hours  pantoprazole  Injectable 40 milliGRAM(s) IV Push two times a day  petrolatum Ophthalmic Ointment 1 Application(s) Right EYE two times a day  psyllium Powder 1 Packet(s) Oral daily  sodium chloride 2 Gram(s) Oral two times a day    MEDICATIONS  (PRN):  acetaminophen   Oral Liquid .. 650 milliGRAM(s) Oral every 6 hours PRN Temp greater or equal to 38C (100.4F), Mild Pain (1 - 3)  artificial tears (preservative free) Ophthalmic Solution 1 Drop(s) Both EYES three times a day PRN Dry Eyes  calamine/zinc oxide Lotion 1 Application(s) Topical two times a day PRN Rash and/or Itching  dextrose Oral Gel 15 Gram(s) Oral once PRN Blood Glucose LESS THAN 70 milliGRAM(s)/deciliter      VITALS: T(C): 36 (09-27-23 @ 08:00)  T(F): 96.8 (09-27-23 @ 08:00), Max: 98.9 (09-26-23 @ 14:20)  HR: 105 (09-27-23 @ 11:17) (89 - 112)  BP: 122/37 (09-27-23 @ 11:00) (90/24 - 131/30)  RR:  (16 - 29)  SpO2:  (89% - 100%)  Wt(kg): --  General: AAO x 0, appropriate mood and affect    Hand held Slit Lamp Exam:  External: Flat OU  Lids/Lashes/Lacrimal Ducts: Flat OU    Sclera/Conjunctiva: W+Q OD. Conjunctivochalasis OU. Subconjunctival hemorrhage and injection OS. 2+ chemosis OU.  Cornea: Cl OD. D-folds centrally OS. Delle at 3 o'clock OS. Dense SPK OU. No epi defects OU.  Anterior Chamber: D+F OU. No hypopyon OS.   Iris: Flat OU  Lens: Cl OU    Fundus Exam: dilated with 1% tropicamide and 2.5% phenylephrine  Approval obtained from primary team for dilation  Patient aware that pupils can remained dilated for at least 4-6 hours  Exam performed with 20D lens    Vitreous: wnl OU  Disc, cup/disc: sharp and pink, 0.6 OU  Macula: Flat OU  Vessels: Normal caliber OU  Periphery: Diabetic retinopathy OU.    Labs/Imaging:  No ophtho imaging.    MSSA in blood culture on 9/1/23, blood cultures now clear.    Orthopedic

## 2023-09-27 NOTE — PROGRESS NOTE ADULT - SUBJECTIVE AND OBJECTIVE BOX
S: Transferred to MICU for hypotension and worsening respiratory failure with bronch showing airway obstruction      Medications: MEDICATIONS  (STANDING):  acetic acid 0.25% Topical Irrigation 1 Application(s) Topical two times a day  albuterol/ipratropium for Nebulization 3 milliLiter(s) Nebulizer every 6 hours  artificial tears (preservative free) Ophthalmic Solution 1 Drop(s) Right EYE every 2 hours  atorvastatin 40 milliGRAM(s) Oral at bedtime  buMETAnide Injectable 2 milliGRAM(s) IV Push <User Schedule>  chlorhexidine 0.12% Liquid 15 milliLiter(s) Oral Mucosa every 12 hours  chlorhexidine 2% Cloths 1 Application(s) Topical daily  chlorhexidine 4% Liquid 1 Application(s) Topical <User Schedule>  DAPTOmycin IVPB 500 milliGRAM(s) IV Intermittent every 48 hours  dexMEDEtomidine Infusion 0.3 MICROgram(s)/kG/Hr (4.99 mL/Hr) IV Continuous <Continuous>  dextrose 5%. 1000 milliLiter(s) (50 mL/Hr) IV Continuous <Continuous>  dextrose 5%. 1000 milliLiter(s) (100 mL/Hr) IV Continuous <Continuous>  dextrose 50% Injectable 12.5 Gram(s) IV Push once  dextrose 50% Injectable 25 Gram(s) IV Push once  dextrose 50% Injectable 25 Gram(s) IV Push once  epoetin odalis (EPOGEN) Injectable 61851 Unit(s) SubCutaneous <User Schedule>  erythromycin   Ointment 1 Application(s) Left EYE four times a day  ferrous    sulfate Liquid 300 milliGRAM(s) Oral daily  fluconAZOLE IVPB 200 milliGRAM(s) IV Intermittent every 24 hours  glucagon  Injectable 1 milliGRAM(s) IntraMuscular once  heparin  Infusion 1050 Unit(s)/Hr (10.5 mL/Hr) IV Continuous <Continuous>  hydrocortisone sodium succinate Injectable 50 milliGRAM(s) IV Push every 8 hours  insulin lispro (ADMELOG) corrective regimen sliding scale   SubCutaneous every 6 hours  insulin NPH human recombinant 3 Unit(s) SubCutaneous every 6 hours  midodrine. 20 milliGRAM(s) Oral three times a day  mupirocin 2% Ointment 1 Application(s) Topical two times a day  norepinephrine Infusion 0.06 MICROgram(s)/kG/Min (7.48 mL/Hr) IV Continuous <Continuous>  ofloxacin 0.3% Solution 1 Drop(s) Left EYE every 2 hours  pantoprazole  Injectable 40 milliGRAM(s) IV Push two times a day  petrolatum Ophthalmic Ointment 1 Application(s) Right EYE two times a day  psyllium Powder 1 Packet(s) Oral daily  sodium chloride 2 Gram(s) Oral two times a day    MEDICATIONS  (PRN):  acetaminophen   Oral Liquid .. 650 milliGRAM(s) Oral every 6 hours PRN Temp greater or equal to 38C (100.4F), Mild Pain (1 - 3)  artificial tears (preservative free) Ophthalmic Solution 1 Drop(s) Both EYES three times a day PRN Dry Eyes  calamine/zinc oxide Lotion 1 Application(s) Topical two times a day PRN Rash and/or Itching  dextrose Oral Gel 15 Gram(s) Oral once PRN Blood Glucose LESS THAN 70 milliGRAM(s)/deciliter  sodium chloride 0.9% lock flush 10 milliLiter(s) IV Push every 1 hour PRN Pre/post blood products, medications, blood draw, and to maintain line patency       Vitals:  Vital Signs Last 24 Hrs  T(C): 36 (27 Sep 2023 20:14), Max: 36.8 (27 Sep 2023 12:00)  T(F): 96.8 (27 Sep 2023 20:14), Max: 98.2 (27 Sep 2023 12:00)  HR: 90 (27 Sep 2023 20:14) (87 - 112)  BP: 137/41 (27 Sep 2023 20:14) (70/33 - 139/39)  BP(mean): 65 (27 Sep 2023 20:00) (29 - 75)  RR: 20 (27 Sep 2023 20:14) (16 - 29)  SpO2: 100% (27 Sep 2023 20:00) (89% - 100%)    Parameters below as of 27 Sep 2023 20:14  Patient On (Oxygen Delivery Method): ventilator    O2 Concentration (%): 75        Neurological Exam:  Mental Status: Eyes open, looking around, follows simple commands. + trach and NGT   Cranial Nerves: PERRL slightly sluggish, Orthophoric gaze, decreased BTT on the L?  Motor: Moves upper > lower extremities spontaneously with less movement in RUE  Sensation: WD to noxious x4  Reflexes: 1+ throughout at biceps, brachioradialis, triceps, patellars and ankles bilaterally and equal. No clonus.     I personally reviewed the below data/images/labs:         LABS:                          8.4    11.31 )-----------( 351      ( 27 Sep 2023 02:40 )             27.6     09-27    129<L>  |  91<L>  |  107<H>  ----------------------------<  188<H>  4.3   |  22  |  2.99<H>    Ca    9.6      27 Sep 2023 02:50  Phos  4.7     09-27  Mg     2.60     09-27    TPro  6.1  /  Alb  2.4<L>  /  TBili  0.4  /  DBili  x   /  AST  57<H>  /  ALT  17  /  AlkPhos  409<H>  09-27    LIVER FUNCTIONS - ( 27 Sep 2023 02:50 )  Alb: 2.4 g/dL / Pro: 6.1 g/dL / ALK PHOS: 409 U/L / ALT: 17 U/L / AST: 57 U/L / GGT: x           PTT - ( 27 Sep 2023 02:40 )  PTT:29.3 sec  Urinalysis Basic - ( 27 Sep 2023 02:50 )    Color: x / Appearance: x / SG: x / pH: x  Gluc: 188 mg/dL / Ketone: x  / Bili: x / Urobili: x   Blood: x / Protein: x / Nitrite: x   Leuk Esterase: x / RBC: x / WBC x   Sq Epi: x / Non Sq Epi: x / Bacteria: x          < from: CT Head No Cont (08.15.23 @ 17:20) >    ACC: [id number] EXAM:  CT BRAIN   ORDERED BY: MINAL SAM     PROCEDURE DATE:  08/15/2023          INTERPRETATION:  Clinical indication: Change in neuro exam.    Multiple axial sections were performed from base to vertex without   contrast enhancement. Coronal and sagittal reconstructions were   reformatted well.    This exam is compared prior head CT performed on August 11, 2023    Parenchymal volume loss and chronic microvessel ischemic changes are   again seen.    Abnormal low-attenuation involving the left occipital cortical   subcortical region is again seen. 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    No evidence of acute hemorrhage mass or mass effect is seen.    Evaluation of the osseous structures with appropriate window demonstrates   sclerotic changes about the left mastoid region which appears stable.   Opacification left middle ear region is again seen.    Patient is status post bilateral cataract surgery.    Impression: Stable exam.    < end of copied text >    vEEG:    Clinical Impression:  - Severe diffuse non-specific cerebral dysfunction  - There were no epileptiform abnormalities or seizures recorded.        CTH 8/11:    VENTRICLES AND SULCI: Age-appropriate involutional change  INTRA-AXIAL:  Old left PCA infarct as seen on the prior unchanged.   Microvascular ischemic changes involving the periventricular and   subcortical white matter as seen previously  EXTRA-AXIAL:  No mass or collection is seen.  VISUALIZED SINUSES:  Clear.  VISUALIZED MASTOIDS: Left mastoid sclerosis  CALVARIUM: Infiltrative appearance tothe calvarium may be indicative of   marrow infiltration on the basis of patient's known diagnosis of breast   cancer. MISCELLANEOUS:  None.    IMPRESSION:  No significant interval change compared with 7/17/2023 in   left PCA infarct which is old. Microvascular ischemic changes involving   the periventricular and subcortical white matter as seen   previously.Questionable lesions at the level of the calvarium related to   possible breast CA. Clinical correlation recommended.    --- End of Report ---      ct< from: CT Head No Cont (09.26.23 @ 21:02) >  IMPRESSION: Vague questionable areas of hypoattenuation within the   bilateral cerebellar hemispheres which may be compatible with   acute/subacute ischemia. Recommend further evaluation with a brain MRI   study, provided there are no MRI contraindications.    No acute intracranial hemorrhage.    Previously seen questionable vague wedge-shaped area of hypoattenuation   in the left parietal lobe with artifactual secondary to motion and volume   averaging with a prominent sulcus.    Chronic left occipital lobe infarct.    < end of copied text >     S: Transferred to MICU for hypotension and worsening respiratory failure with bronch showing airway obstruction      Medications: MEDICATIONS  (STANDING):  acetic acid 0.25% Topical Irrigation 1 Application(s) Topical two times a day  albuterol/ipratropium for Nebulization 3 milliLiter(s) Nebulizer every 6 hours  artificial tears (preservative free) Ophthalmic Solution 1 Drop(s) Right EYE every 2 hours  atorvastatin 40 milliGRAM(s) Oral at bedtime  buMETAnide Injectable 2 milliGRAM(s) IV Push <User Schedule>  chlorhexidine 0.12% Liquid 15 milliLiter(s) Oral Mucosa every 12 hours  chlorhexidine 2% Cloths 1 Application(s) Topical daily  chlorhexidine 4% Liquid 1 Application(s) Topical <User Schedule>  DAPTOmycin IVPB 500 milliGRAM(s) IV Intermittent every 48 hours  dexMEDEtomidine Infusion 0.3 MICROgram(s)/kG/Hr (4.99 mL/Hr) IV Continuous <Continuous>  dextrose 5%. 1000 milliLiter(s) (50 mL/Hr) IV Continuous <Continuous>  dextrose 5%. 1000 milliLiter(s) (100 mL/Hr) IV Continuous <Continuous>  dextrose 50% Injectable 12.5 Gram(s) IV Push once  dextrose 50% Injectable 25 Gram(s) IV Push once  dextrose 50% Injectable 25 Gram(s) IV Push once  epoetin odalis (EPOGEN) Injectable 47127 Unit(s) SubCutaneous <User Schedule>  erythromycin   Ointment 1 Application(s) Left EYE four times a day  ferrous    sulfate Liquid 300 milliGRAM(s) Oral daily  fluconAZOLE IVPB 200 milliGRAM(s) IV Intermittent every 24 hours  glucagon  Injectable 1 milliGRAM(s) IntraMuscular once  heparin  Infusion 1050 Unit(s)/Hr (10.5 mL/Hr) IV Continuous <Continuous>  hydrocortisone sodium succinate Injectable 50 milliGRAM(s) IV Push every 8 hours  insulin lispro (ADMELOG) corrective regimen sliding scale   SubCutaneous every 6 hours  insulin NPH human recombinant 3 Unit(s) SubCutaneous every 6 hours  midodrine. 20 milliGRAM(s) Oral three times a day  mupirocin 2% Ointment 1 Application(s) Topical two times a day  norepinephrine Infusion 0.06 MICROgram(s)/kG/Min (7.48 mL/Hr) IV Continuous <Continuous>  ofloxacin 0.3% Solution 1 Drop(s) Left EYE every 2 hours  pantoprazole  Injectable 40 milliGRAM(s) IV Push two times a day  petrolatum Ophthalmic Ointment 1 Application(s) Right EYE two times a day  psyllium Powder 1 Packet(s) Oral daily  sodium chloride 2 Gram(s) Oral two times a day    MEDICATIONS  (PRN):  acetaminophen   Oral Liquid .. 650 milliGRAM(s) Oral every 6 hours PRN Temp greater or equal to 38C (100.4F), Mild Pain (1 - 3)  artificial tears (preservative free) Ophthalmic Solution 1 Drop(s) Both EYES three times a day PRN Dry Eyes  calamine/zinc oxide Lotion 1 Application(s) Topical two times a day PRN Rash and/or Itching  dextrose Oral Gel 15 Gram(s) Oral once PRN Blood Glucose LESS THAN 70 milliGRAM(s)/deciliter  sodium chloride 0.9% lock flush 10 milliLiter(s) IV Push every 1 hour PRN Pre/post blood products, medications, blood draw, and to maintain line patency       Vitals:  Vital Signs Last 24 Hrs  T(C): 36 (27 Sep 2023 20:14), Max: 36.8 (27 Sep 2023 12:00)  T(F): 96.8 (27 Sep 2023 20:14), Max: 98.2 (27 Sep 2023 12:00)  HR: 90 (27 Sep 2023 20:14) (87 - 112)  BP: 137/41 (27 Sep 2023 20:14) (70/33 - 139/39)  BP(mean): 65 (27 Sep 2023 20:00) (29 - 75)  RR: 20 (27 Sep 2023 20:14) (16 - 29)  SpO2: 100% (27 Sep 2023 20:00) (89% - 100%)    Parameters below as of 27 Sep 2023 20:14  Patient On (Oxygen Delivery Method): ventilator    O2 Concentration (%): 75        Neurological Exam:  Mental Status: Eyes closed, sedated with precedex. Does not follow commands,  + trach to vent and NGT   Cranial Nerves: PERRL slightly sluggish, L subconjunctival hemorrhage  Motor: Moves upper extremities spontaneously. no movement noted in lowers  Sensation: WD to noxious x4  Reflexes: 1+ throughout at biceps, brachioradialis, triceps, patellars and ankles bilaterally and equal. No clonus.     I personally reviewed the below data/images/labs:         LABS:                          8.4    11.31 )-----------( 351      ( 27 Sep 2023 02:40 )             27.6     09-27    129<L>  |  91<L>  |  107<H>  ----------------------------<  188<H>  4.3   |  22  |  2.99<H>    Ca    9.6      27 Sep 2023 02:50  Phos  4.7     09-27  Mg     2.60     09-27    TPro  6.1  /  Alb  2.4<L>  /  TBili  0.4  /  DBili  x   /  AST  57<H>  /  ALT  17  /  AlkPhos  409<H>  09-27    LIVER FUNCTIONS - ( 27 Sep 2023 02:50 )  Alb: 2.4 g/dL / Pro: 6.1 g/dL / ALK PHOS: 409 U/L / ALT: 17 U/L / AST: 57 U/L / GGT: x           PTT - ( 27 Sep 2023 02:40 )  PTT:29.3 sec  Urinalysis Basic - ( 27 Sep 2023 02:50 )    Color: x / Appearance: x / SG: x / pH: x  Gluc: 188 mg/dL / Ketone: x  / Bili: x / Urobili: x   Blood: x / Protein: x / Nitrite: x   Leuk Esterase: x / RBC: x / WBC x   Sq Epi: x / Non Sq Epi: x / Bacteria: x          < from: CT Head No Cont (08.15.23 @ 17:20) >    ACC: [id number] EXAM:  CT BRAIN   ORDERED BY: MINAL SAM     PROCEDURE DATE:  08/15/2023          INTERPRETATION:  Clinical indication: Change in neuro exam.    Multiple axial sections were performed from base to vertex without   contrast enhancement. Coronal and sagittal reconstructions were   reformatted well.    This exam is compared prior head CT performed on August 11, 2023    Parenchymal volume loss and chronic microvessel ischemic changes are   again seen.    Abnormal low-attenuation involving the left occipital cortical   subcortical region is again seen. 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    No evidence of acute hemorrhage mass or mass effect is seen.    Evaluation of the osseous structures with appropriate window demonstrates   sclerotic changes about the left mastoid region which appears stable.   Opacification left middle ear region is again seen.    Patient is status post bilateral cataract surgery.    Impression: Stable exam.    < end of copied text >    vEEG:    Clinical Impression:  - Severe diffuse non-specific cerebral dysfunction  - There were no epileptiform abnormalities or seizures recorded.        CTH 8/11:    VENTRICLES AND SULCI: Age-appropriate involutional change  INTRA-AXIAL:  Old left PCA infarct as seen on the prior unchanged.   Microvascular ischemic changes involving the periventricular and   subcortical white matter as seen previously  EXTRA-AXIAL:  No mass or collection is seen.  VISUALIZED SINUSES:  Clear.  VISUALIZED MASTOIDS: Left mastoid sclerosis  CALVARIUM: Infiltrative appearance tothe calvarium may be indicative of   marrow infiltration on the basis of patient's known diagnosis of breast   cancer. MISCELLANEOUS:  None.    IMPRESSION:  No significant interval change compared with 7/17/2023 in   left PCA infarct which is old. Microvascular ischemic changes involving   the periventricular and subcortical white matter as seen   previously.Questionable lesions at the level of the calvarium related to   possible breast CA. Clinical correlation recommended.    --- End of Report ---      ct< from: CT Head No Cont (09.26.23 @ 21:02) >  IMPRESSION: Vague questionable areas of hypoattenuation within the   bilateral cerebellar hemispheres which may be compatible with   acute/subacute ischemia. Recommend further evaluation with a brain MRI   study, provided there are no MRI contraindications.    No acute intracranial hemorrhage.    Previously seen questionable vague wedge-shaped area of hypoattenuation   in the left parietal lobe with artifactual secondary to motion and volume   averaging with a prominent sulcus.    Chronic left occipital lobe infarct.    < end of copied text >

## 2023-09-27 NOTE — PROGRESS NOTE ADULT - SUBJECTIVE AND OBJECTIVE BOX
Subjective: Patient seen and examined. No new events except as noted.   hypotensive yesterday   transferred back to MICU   started  Levophed gtt  REVIEW OF SYSTEMS:  Unable to obtain       MEDICATIONS:  MEDICATIONS  (STANDING):  acetic acid 0.25% Topical Irrigation 1 Application(s) Topical two times a day  albumin human  5% IVPB 250 milliLiter(s) IV Intermittent every 6 hours  albuterol/ipratropium for Nebulization 3 milliLiter(s) Nebulizer every 6 hours  atorvastatin 40 milliGRAM(s) Oral at bedtime  buMETAnide Injectable 2 milliGRAM(s) IV Push <User Schedule>  chlorhexidine 0.12% Liquid 15 milliLiter(s) Oral Mucosa every 12 hours  chlorhexidine 2% Cloths 1 Application(s) Topical daily  DAPTOmycin IVPB 500 milliGRAM(s) IV Intermittent every 48 hours  dextrose 5%. 1000 milliLiter(s) (50 mL/Hr) IV Continuous <Continuous>  dextrose 5%. 1000 milliLiter(s) (100 mL/Hr) IV Continuous <Continuous>  dextrose 50% Injectable 12.5 Gram(s) IV Push once  dextrose 50% Injectable 25 Gram(s) IV Push once  dextrose 50% Injectable 25 Gram(s) IV Push once  epoetin odalis (EPOGEN) Injectable 53788 Unit(s) SubCutaneous <User Schedule>  ferrous    sulfate Liquid 300 milliGRAM(s) Enteral Tube daily  fluconAZOLE IVPB 200 milliGRAM(s) IV Intermittent every 24 hours  glucagon  Injectable 1 milliGRAM(s) IntraMuscular once  heparin  Infusion 1050 Unit(s)/Hr (10.5 mL/Hr) IV Continuous <Continuous>  insulin lispro (ADMELOG) corrective regimen sliding scale   SubCutaneous every 6 hours  insulin NPH human recombinant 3 Unit(s) SubCutaneous every 6 hours  midodrine. 20 milliGRAM(s) Oral three times a day  mupirocin 2% Ointment 1 Application(s) Topical two times a day  norepinephrine Infusion 0.02 MICROgram(s)/kG/Min (2.49 mL/Hr) IV Continuous <Continuous>  ofloxacin 0.3% Solution 1 Drop(s) Left EYE every 2 hours  pantoprazole  Injectable 40 milliGRAM(s) IV Push two times a day  petrolatum Ophthalmic Ointment 1 Application(s) Right EYE two times a day  psyllium Powder 1 Packet(s) Oral daily  sodium chloride 2 Gram(s) Oral two times a day      PHYSICAL EXAM:  T(C): 35.9 (09-27-23 @ 04:00), Max: 37.6 (09-26-23 @ 12:00)  HR: 102 (09-27-23 @ 07:42) (84 - 112)  BP: 130/57 (09-27-23 @ 06:00) (85/53 - 131/30)  RR: 23 (09-27-23 @ 06:00) (16 - 27)  SpO2: 95% (09-27-23 @ 07:42) (89% - 100%)  Wt(kg): --  I&O's Summary    26 Sep 2023 07:01  -  27 Sep 2023 07:00  --------------------------------------------------------  IN: 1285.1 mL / OUT: 700 mL / NET: 585.1 mL          Appearance: NAD, +trach  HEENT: dry oral mucosa  Lymphatic: No lymphadenopathy  Cardiovascular: Normal S1 S2, No JVD, No murmurs, No edema  Respiratory: Decreased BS, + trach to vent	  Neuro: opens eyes  Gastrointestinal: Soft, Non-tender, + BS, + NGT  Skin: No rashes, No ecchymoses, No cyanosis	  Extremities: No strength/ROM 2/2 sedation, + BL LE edema  Vascular: Peripheral pulses palpable 2+ bilaterally  Mode: AC/ CMV (Assist Control/ Continuous Mandatory Ventilation), RR (machine): 12, TV (machine): 360, FiO2: 30, PEEP: 5, ITime: 0.74, MAP: 8, PIP: 29        LABS:    CARDIAC MARKERS:    Blood Gas Profile - Arterial in AM (09.19.23 @ 17:22)   pH, Arterial: 7.28  pCO2, Arterial: 52 mmHg  pO2, Arterial: 75 mmHg  HCO3, Arterial: 24 mmol/L  Base Excess, Arterial: -3.0 mmol/L  Oxygen Saturation, Arterial: 97.9 %  Total CO2, Arterial: 26 mmol/L  FIO2, Arterial: 30                                8.4    11.31 )-----------( 351      ( 27 Sep 2023 02:40 )             27.6     09-27    129<L>  |  91<L>  |  107<H>  ----------------------------<  188<H>  4.3   |  22  |  2.99<H>    Ca    9.6      27 Sep 2023 02:50  Phos  4.7     09-27  Mg     2.60     09-27    TPro  6.1  /  Alb  2.4<L>  /  TBili  0.4  /  DBili  x   /  AST  57<H>  /  ALT  17  /  AlkPhos  409<H>  09-27    proBNP:   Lipid Profile:   HgA1c:   TSH:     Negative          TELEMETRY: 	    ECG:  	  RADIOLOGY:   DIAGNOSTIC TESTING:  [ ] Echocardiogram:  [ ]  Catheterization:  [ ] Stress Test:    OTHER:

## 2023-09-27 NOTE — PROGRESS NOTE ADULT - SUBJECTIVE AND OBJECTIVE BOX
Follow Up:  MSSA bacteremia    Interval History: pt was transferred to MICU, chest/abd CT  unchanged with anasarca    ROS:    Unobtainable because: trached          Allergies  isoniazid (Rash)  nafcillin (Unknown)  hydrALAZINE (Rash)  vitamin E (Short breath; Urticaria; Hives)  doxycycline (Rash)  cefepime (Rash)  NIFEdipine (Urticaria; Hives)        ANTIMICROBIALS:  DAPTOmycin IVPB 500 every 48 hours  fluconAZOLE IVPB 200 every 24 hours      OTHER MEDS:  acetaminophen   Oral Liquid .. 650 milliGRAM(s) Oral every 6 hours PRN  acetic acid 0.25% Topical Irrigation 1 Application(s) Topical two times a day  albuterol/ipratropium for Nebulization 3 milliLiter(s) Nebulizer every 6 hours  artificial tears (preservative free) Ophthalmic Solution 1 Drop(s) Both EYES three times a day PRN  atorvastatin 40 milliGRAM(s) Oral at bedtime  buMETAnide Injectable 2 milliGRAM(s) IV Push <User Schedule>  calamine/zinc oxide Lotion 1 Application(s) Topical two times a day PRN  chlorhexidine 0.12% Liquid 15 milliLiter(s) Oral Mucosa every 12 hours  chlorhexidine 2% Cloths 1 Application(s) Topical daily  dexMEDEtomidine Infusion 0.2 MICROgram(s)/kG/Hr IV Continuous <Continuous>  dextrose 5%. 1000 milliLiter(s) IV Continuous <Continuous>  dextrose 5%. 1000 milliLiter(s) IV Continuous <Continuous>  dextrose 50% Injectable 25 Gram(s) IV Push once  dextrose 50% Injectable 25 Gram(s) IV Push once  dextrose 50% Injectable 12.5 Gram(s) IV Push once  dextrose Oral Gel 15 Gram(s) Oral once PRN  epoetin odalis (EPOGEN) Injectable 99204 Unit(s) SubCutaneous <User Schedule>  ferrous    sulfate Liquid 300 milliGRAM(s) Oral daily  glucagon  Injectable 1 milliGRAM(s) IntraMuscular once  heparin  Infusion 1050 Unit(s)/Hr IV Continuous <Continuous>  hydrocortisone sodium succinate Injectable 50 milliGRAM(s) IV Push every 8 hours  insulin lispro (ADMELOG) corrective regimen sliding scale   SubCutaneous every 6 hours  insulin NPH human recombinant 3 Unit(s) SubCutaneous every 6 hours  midodrine. 20 milliGRAM(s) Oral three times a day  mupirocin 2% Ointment 1 Application(s) Topical two times a day  norepinephrine Infusion 0.06 MICROgram(s)/kG/Min IV Continuous <Continuous>  ofloxacin 0.3% Solution 1 Drop(s) Left EYE every 2 hours  pantoprazole  Injectable 40 milliGRAM(s) IV Push two times a day  petrolatum Ophthalmic Ointment 1 Application(s) Right EYE two times a day  psyllium Powder 1 Packet(s) Oral daily  sodium chloride 2 Gram(s) Oral two times a day      Vital Signs Last 24 Hrs  T(C): 36 (27 Sep 2023 08:00), Max: 37.2 (26 Sep 2023 14:20)  T(F): 96.8 (27 Sep 2023 08:00), Max: 98.9 (26 Sep 2023 14:20)  HR: 105 (27 Sep 2023 11:17) (89 - 112)  BP: 122/37 (27 Sep 2023 11:00) (90/24 - 131/30)  BP(mean): 57 (27 Sep 2023 11:00) (39 - 86)  RR: 24 (27 Sep 2023 11:00) (16 - 29)  SpO2: 99% (27 Sep 2023 11:17) (89% - 100%)    Parameters below as of 27 Sep 2023 11:15  Patient On (Oxygen Delivery Method): conventional ventilator        Physical Exam:  General:    trached   ENT: L ear with crusted dark drainage  Respiratory:   trach on vent  abd:  distended but soft  :     no CVAT, no suprapubic tenderness, no willard  Musculoskeletal : no joint swelling  Skin:    erythematous patchy rash on abd, chest, extremities slightly better  vascular: no phlebitis                          8.4    11.31 )-----------( 351      ( 27 Sep 2023 02:40 )             27.6       09-27    129<L>  |  91<L>  |  107<H>  ----------------------------<  188<H>  4.3   |  22  |  2.99<H>    Ca    9.6      27 Sep 2023 02:50  Phos  4.7     09-27  Mg     2.60     09-27    TPro  6.1  /  Alb  2.4<L>  /  TBili  0.4  /  DBili  x   /  AST  57<H>  /  ALT  17  /  AlkPhos  409<H>  09-27      Urinalysis Basic - ( 27 Sep 2023 02:50 )    Color: x / Appearance: x / SG: x / pH: x  Gluc: 188 mg/dL / Ketone: x  / Bili: x / Urobili: x   Blood: x / Protein: x / Nitrite: x   Leuk Esterase: x / RBC: x / WBC x   Sq Epi: x / Non Sq Epi: x / Bacteria: x        MICROBIOLOGY:  v  .Blood Blood-Venous  09-20-23   No growth at 5 days  --  --      .Blood Blood-Peripheral  09-20-23   No growth at 5 days  --  --      Ear Ear  09-13-23   Moderate Candida albicans "Susceptibilities not performed"  --  --      .Blood Blood-Venous  09-10-23   No growth at 5 days  --  --      .Sputum Sputum  09-10-23   Normal Respiratory Cate present  --    Rare polymorphonuclear leukocytes per low power field  No Squamous epithelial cells per low power field  Rare Yeast like cells seen per oil power field  Rare Gram Positive Cocci in Clusters seen per oil power field      .Blood Blood-Peripheral  09-10-23   No growth at 5 days  --  --      .Sputum Sputum  09-08-23   Normal Respiratory Cate present  --    Numerous polymorphonuclear leukocytes per low power field  Numerous Squamous epithelial cells per low power field  Few Yeast like cells per oil power field  Results consistent with oropharyngeal contamination      .Blood Blood-Peripheral  09-08-23   No growth at 5 days  --  --      Ear Ear  09-06-23   No growth at 5 days  --  --      .Blood Blood-Peripheral  09-04-23   No growth at 5 days  --  --      .Bronchial Bronchial Lavage  09-01-23   Numerous Staphylococcus aureus Multiple Morphological Strains  Normal Respiratory Cate present  --  Staphylococcus aureus  Staphylococcus aureus      .Blood Blood-Peripheral  09-01-23   Growth in aerobic and anaerobic bottles: Staphylococcus aureus  Direct identification is available within approximately 3-5  hours either by Blood Panel Multiplexed PCR or Direct  MALDI-TOF. Details: [email or web address]  Growth in anaerobic bottle: blood culture bottle turned positive after  the final report was released.  --  Blood Culture PCR  Staphylococcus aureus      ET Tube ET Tube  09-01-23   Moderate Staphylococcus aureus  Moderate Escherichia coli ESBL  Normal Respiratory Cate present  --  Staphylococcus aureus  Escherichia coli ESBL          Rapid RVP Result: NotDetec (09-26 @ 16:30)  Rapid RVP Result: NotDetec (09-20 @ 15:35)        RADIOLOGY:  Images independently visualized and reviewed personally, findings as below  < from: CT Head No Cont (09.26.23 @ 21:02) >  IMPRESSION: Vague questionable areas of hypoattenuation within the   bilateral cerebellar hemispheres which may be compatible with   acute/subacute ischemia. Recommend further evaluation with a brain MRI   study, provided there are no MRI contraindications.    No acute intracranial hemorrhage.    Previously seen questionable vague wedge-shaped area of hypoattenuation   in the left parietal lobe with artifactual secondary to motion and volume   averaging with a prominent sulcus.    Chronic left occipital lobe infarct.    < end of copied text >  < from: CT Abdomen and Pelvis No Cont (09.26.23 @ 21:02) >  IMPRESSION:  Right upper lobe consolidation, unchanged. Left upper lobe consolidation   and tree-in-bud opacities and bilateral groundglass opacities, decreased.   Persistent interlobular septal thickening.    Bilateral pleural effusions with adjacent compressive atelectasis,   unchanged.    Small volume ascites.    Anasarca.      < end of copied text >

## 2023-09-28 NOTE — PROGRESS NOTE ADULT - ASSESSMENT
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 While in MICU patient noted with worsening renal failure requiring HD initiation, CGE/ Melena s/p EGD 8/31 found with esophagitis and erosive gastropathy, new right cerebellar infarct and fevers second to ESBL COLI and MSSA VAP, MSSA bacteremia, left ear otitis externa and drug rxn to cephalosporins Course further complicated by prolonged vent time s/p tracheostomy 9/1 and transferred to RCU 9/3 completed by recurrent fevers with high PIP, respiratory acidosis and concern for volume overloaded.   CTH repeated 9/26 for concern for encephalopathy - has known now - chronic bilateral cerebellar infarcts, L PCA infarct. L parietal hypodensity seen on prior CT likely artifactual    Impression:   Metabolic encephalopathy related to shock, infection, doubt new stroke  L PCA stroke, ESUS s/p ILR, also with bilateral centrum semiovale watershed infarcts   Multiple embolic strokes on MRI 8/17 - R cerebellum, midbrain, multiple other tiny embolic infarcts.   Dementia   MSSA bacteremia, r/o endocarditis - on Daptomycin  Acute popliteal DVT on hep gtt    Recommendations   [] New CTH from 9/26 without any evidence of acute infarct -> can consider repeat CTH down the line in mental status does not improve with treatment of underlying metabolic derangements and infections  []On ABx for MSSA bacteremia, possble HSV keratitis  [] repeat TTE or consider STEPHANIE to rule out endocarditis   [] GI following for coffee ground emesis - esophagitis seen on colonoscopy, monitor for bleeding now that on heparin gtt  [] family declined kidney biopsy for AIN -> s/p steroid tx   [] C/w home ASA 81 mg if no contraindications   [] hep gtt resumed, ok to target normal PTT, low suspicion for new ischmia   [] C/w home Atorvastatin 10 mg qhs   [] would interrogate ILR and review tele to see if pAF -. no AF seen  [] DVT/GI ppx - hep gtt   [] Neurochecks q4hr   [] BP goals: Normotension - on pressors  [] PT/OT when able   [] HgA1C goals < 7.0   [] continue to address GOC with family    D/w Renard Charles DO  Vascular Neurology  Office [phone]

## 2023-09-28 NOTE — PROGRESS NOTE ADULT - SUBJECTIVE AND OBJECTIVE BOX
INTERVAL HPI/OVERNIGHT EVENTS:     SUBJECTIVE: Patient seen and examined at bedside. remains encephalopathic.     OBJECTIVE:    VITAL SIGNS:  ICU Vital Signs Last 24 Hrs  T(C): 37.2 (30 Aug 2023 04:00), Max: 37.6 (29 Aug 2023 16:00)  T(F): 99 (30 Aug 2023 04:00), Max: 99.6 (29 Aug 2023 16:00)  HR: 59 (30 Aug 2023 11:02) (59 - 73)  BP: --  BP(mean): --  ABP: 159/31 (30 Aug 2023 09:00) (100/18 - 177/38)  ABP(mean): 73 (30 Aug 2023 09:00) (47 - 86)  RR: 12 (30 Aug 2023 09:00) (12 - 25)  SpO2: 100% (30 Aug 2023 11:02) (95% - 100%)    O2 Parameters below as of 30 Aug 2023 09:00  Patient On (Oxygen Delivery Method): ventilator    O2 Concentration (%): 30      Mode: AC/ CMV (Assist Control/ Continuous Mandatory Ventilation), RR (machine): 12, TV (machine): 360, FiO2: 30, PEEP: 5, ITime: 0.74, MAP: 8, PIP: 29    08-29 @ 07:01  -  08-30 @ 07:00  --------------------------------------------------------  IN: 1904.5 mL / OUT: 3787 mL / NET: -1882.5 mL    08-30 @ 07:01  -  08-30 @ 11:08  --------------------------------------------------------  IN: 48 mL / OUT: 30 mL / NET: 18 mL      CAPILLARY BLOOD GLUCOSE      POCT Blood Glucose.: 261 mg/dL (30 Aug 2023 05:52)        PHYSICAL EXAM:  General: Comfortable, no acute distress, cooperative with exam.   HEENT: PERRLA, EOMI, moist mucous membranes.  Respiratory: CTAB, mechanical breath sounds, no coughing, wheezes, crackles, or rales.  CV: RRR, S1S2, no murmurs, rubs or gallops. No JVD. Distal pulses intact.  Abdominal: Soft, nontender, nondistended, no rebound or guarding, normal bowel sounds.  Neurology:  unresponsive  Extremities: +Pitting edema bilaterally      MEDICATIONS:  MEDICATIONS  (STANDING):  albuterol/ipratropium for Nebulization 3 milliLiter(s) Nebulizer every 8 hours  aspirin  chewable 81 milliGRAM(s) Oral daily  atorvastatin 40 milliGRAM(s) Oral at bedtime  chlorhexidine 0.12% Liquid 15 milliLiter(s) Oral Mucosa every 12 hours  chlorhexidine 2% Cloths 1 Application(s) Topical daily  cloNIDine 0.3 milliGRAM(s) Oral every 8 hours  dexMEDEtomidine Infusion 1 MICROgram(s)/kG/Hr (16.6 mL/Hr) IV Continuous <Continuous>  dextrose 5%. 1000 milliLiter(s) (100 mL/Hr) IV Continuous <Continuous>  dextrose 5%. 1000 milliLiter(s) (50 mL/Hr) IV Continuous <Continuous>  dextrose 50% Injectable 25 Gram(s) IV Push once  dextrose 50% Injectable 12.5 Gram(s) IV Push once  dextrose 50% Injectable 25 Gram(s) IV Push once  doxazosin 6 milliGRAM(s) Oral <User Schedule>  glucagon  Injectable 1 milliGRAM(s) IntraMuscular once  insulin lispro (ADMELOG) corrective regimen sliding scale   SubCutaneous every 6 hours  labetalol 600 milliGRAM(s) Oral three times a day  niCARdipine Infusion 2.5 mG/Hr (12.5 mL/Hr) IV Continuous <Continuous>  pantoprazole  Injectable 40 milliGRAM(s) IV Push two times a day  predniSONE   Tablet 40 milliGRAM(s) Oral daily  sevelamer carbonate Powder 1600 milliGRAM(s) Oral three times a day    MEDICATIONS  (PRN):  acetaminophen   Oral Liquid .. 650 milliGRAM(s) Oral every 6 hours PRN Temp greater or equal to 38C (100.4F), Mild Pain (1 - 3)  dextrose Oral Gel 15 Gram(s) Oral once PRN Blood Glucose LESS THAN 70 milliGRAM(s)/deciliter      ALLERGIES:  Allergies    isoniazid (Rash)  nafcillin (Unknown)  hydrALAZINE (Rash)  vitamin E (Short breath; Urticaria; Hives)  doxycycline (Rash)  cefepime (Rash)  NIFEdipine (Urticaria; Hives)    Intolerances        LABS:                        8.0    11.27 )-----------( 229      ( 30 Aug 2023 00:05 )             24.6     08-30    135  |  97<L>  |  57<H>  ----------------------------<  402<H>  3.3<L>   |  23  |  2.29<H>    Ca    8.2<L>      30 Aug 2023 00:05  Phos  4.1     08-30  Mg     1.80     08-30    TPro  5.6<L>  /  Alb  2.5<L>  /  TBili  0.2  /  DBili  x   /  AST  9   /  ALT  10  /  AlkPhos  123<H>  08-30    PT/INR - ( 30 Aug 2023 00:05 )   PT: 11.4 sec;   INR: 1.02 ratio         PTT - ( 29 Aug 2023 01:10 )  PTT:28.3 sec  Urinalysis Basic - ( 30 Aug 2023 00:05 )    Color: x / Appearance: x / SG: x / pH: x  Gluc: 402 mg/dL / Ketone: x  / Bili: x / Urobili: x   Blood: x / Protein: x / Nitrite: x   Leuk Esterase: x / RBC: x / WBC x   Sq Epi: x / Non Sq Epi: x / Bacteria: x        RADIOLOGY & ADDITIONAL TESTS: Reviewed.       INTERVAL HPI/OVERNIGHT EVENTS: s/p bronch overnight for Fth27-40s, showed trache well above main ines and again severe dynamic airway collapse. ABG proved slightly to 7.27/55/53. tolerated HD w/ 1.3L fluid removal, weaned off levophed following session.    SUBJECTIVE: Patient seen and examined at bedside. remains encephalopathic.     OBJECTIVE:    VITAL SIGNS:  ICU Vital Signs Last 24 Hrs  T(C): 37.2 (30 Aug 2023 04:00), Max: 37.6 (29 Aug 2023 16:00)  T(F): 99 (30 Aug 2023 04:00), Max: 99.6 (29 Aug 2023 16:00)  HR: 59 (30 Aug 2023 11:02) (59 - 73)  BP: --  BP(mean): --  ABP: 159/31 (30 Aug 2023 09:00) (100/18 - 177/38)  ABP(mean): 73 (30 Aug 2023 09:00) (47 - 86)  RR: 12 (30 Aug 2023 09:00) (12 - 25)  SpO2: 100% (30 Aug 2023 11:02) (95% - 100%)    O2 Parameters below as of 30 Aug 2023 09:00  Patient On (Oxygen Delivery Method): ventilator    O2 Concentration (%): 30      Mode: AC/ CMV (Assist Control/ Continuous Mandatory Ventilation), RR (machine): 12, TV (machine): 360, FiO2: 30, PEEP: 5, ITime: 0.74, MAP: 8, PIP: 29    08-29 @ 07:01  -  08-30 @ 07:00  --------------------------------------------------------  IN: 1904.5 mL / OUT: 3787 mL / NET: -1882.5 mL    08-30 @ 07:01  -  08-30 @ 11:08  --------------------------------------------------------  IN: 48 mL / OUT: 30 mL / NET: 18 mL      CAPILLARY BLOOD GLUCOSE      POCT Blood Glucose.: 261 mg/dL (30 Aug 2023 05:52)        PHYSICAL EXAM:  General: Comfortable, no acute distress, cooperative with exam.   HEENT: PERRLA, EOMI, moist mucous membranes.  Respiratory: CTAB, mechanical breath sounds, no coughing, wheezes, crackles, or rales.  CV: RRR, S1S2, no murmurs, rubs or gallops. No JVD. Distal pulses intact.  Abdominal: Soft, nontender, nondistended, no rebound or guarding, normal bowel sounds.  Neurology:  unresponsive  Extremities: +Pitting edema bilaterally      MEDICATIONS:  MEDICATIONS  (STANDING):  albuterol/ipratropium for Nebulization 3 milliLiter(s) Nebulizer every 8 hours  aspirin  chewable 81 milliGRAM(s) Oral daily  atorvastatin 40 milliGRAM(s) Oral at bedtime  chlorhexidine 0.12% Liquid 15 milliLiter(s) Oral Mucosa every 12 hours  chlorhexidine 2% Cloths 1 Application(s) Topical daily  cloNIDine 0.3 milliGRAM(s) Oral every 8 hours  dexMEDEtomidine Infusion 1 MICROgram(s)/kG/Hr (16.6 mL/Hr) IV Continuous <Continuous>  dextrose 5%. 1000 milliLiter(s) (100 mL/Hr) IV Continuous <Continuous>  dextrose 5%. 1000 milliLiter(s) (50 mL/Hr) IV Continuous <Continuous>  dextrose 50% Injectable 25 Gram(s) IV Push once  dextrose 50% Injectable 12.5 Gram(s) IV Push once  dextrose 50% Injectable 25 Gram(s) IV Push once  doxazosin 6 milliGRAM(s) Oral <User Schedule>  glucagon  Injectable 1 milliGRAM(s) IntraMuscular once  insulin lispro (ADMELOG) corrective regimen sliding scale   SubCutaneous every 6 hours  labetalol 600 milliGRAM(s) Oral three times a day  niCARdipine Infusion 2.5 mG/Hr (12.5 mL/Hr) IV Continuous <Continuous>  pantoprazole  Injectable 40 milliGRAM(s) IV Push two times a day  predniSONE   Tablet 40 milliGRAM(s) Oral daily  sevelamer carbonate Powder 1600 milliGRAM(s) Oral three times a day    MEDICATIONS  (PRN):  acetaminophen   Oral Liquid .. 650 milliGRAM(s) Oral every 6 hours PRN Temp greater or equal to 38C (100.4F), Mild Pain (1 - 3)  dextrose Oral Gel 15 Gram(s) Oral once PRN Blood Glucose LESS THAN 70 milliGRAM(s)/deciliter      ALLERGIES:  Allergies    isoniazid (Rash)  nafcillin (Unknown)  hydrALAZINE (Rash)  vitamin E (Short breath; Urticaria; Hives)  doxycycline (Rash)  cefepime (Rash)  NIFEdipine (Urticaria; Hives)    Intolerances        LABS:                        8.0    11.27 )-----------( 229      ( 30 Aug 2023 00:05 )             24.6     08-30    135  |  97<L>  |  57<H>  ----------------------------<  402<H>  3.3<L>   |  23  |  2.29<H>    Ca    8.2<L>      30 Aug 2023 00:05  Phos  4.1     08-30  Mg     1.80     08-30    TPro  5.6<L>  /  Alb  2.5<L>  /  TBili  0.2  /  DBili  x   /  AST  9   /  ALT  10  /  AlkPhos  123<H>  08-30    PT/INR - ( 30 Aug 2023 00:05 )   PT: 11.4 sec;   INR: 1.02 ratio         PTT - ( 29 Aug 2023 01:10 )  PTT:28.3 sec  Urinalysis Basic - ( 30 Aug 2023 00:05 )    Color: x / Appearance: x / SG: x / pH: x  Gluc: 402 mg/dL / Ketone: x  / Bili: x / Urobili: x   Blood: x / Protein: x / Nitrite: x   Leuk Esterase: x / RBC: x / WBC x   Sq Epi: x / Non Sq Epi: x / Bacteria: x        RADIOLOGY & ADDITIONAL TESTS: Reviewed.

## 2023-09-28 NOTE — PROGRESS NOTE ADULT - SUBJECTIVE AND OBJECTIVE BOX
Follow Up:  MSSA bacteremia    Interval History: s/p shiley and HD yesterday, not on pressors anymore, still with some rash    ROS:    Unobtainable because: trached      Allergies  isoniazid (Rash)  nafcillin (Unknown)  hydrALAZINE (Rash)  vitamin E (Short breath; Urticaria; Hives)  doxycycline (Rash)  cefepime (Rash)  NIFEdipine (Urticaria; Hives)        ANTIMICROBIALS:  DAPTOmycin IVPB 500 every 48 hours      OTHER MEDS:  acetaminophen   Oral Liquid .. 650 milliGRAM(s) Oral every 6 hours PRN  acetic acid 0.25% Topical Irrigation 1 Application(s) Topical two times a day  albuterol/ipratropium for Nebulization 3 milliLiter(s) Nebulizer every 6 hours  artificial tears (preservative free) Ophthalmic Solution 1 Drop(s) Both EYES three times a day PRN  artificial tears (preservative free) Ophthalmic Solution 1 Drop(s) Right EYE every 2 hours  atorvastatin 40 milliGRAM(s) Oral at bedtime  buMETAnide Injectable 2 milliGRAM(s) IV Push <User Schedule>  calamine/zinc oxide Lotion 1 Application(s) Topical two times a day PRN  chlorhexidine 0.12% Liquid 15 milliLiter(s) Oral Mucosa every 12 hours  chlorhexidine 2% Cloths 1 Application(s) Topical daily  chlorhexidine 4% Liquid 1 Application(s) Topical <User Schedule>  dexMEDEtomidine Infusion 0.3 MICROgram(s)/kG/Hr IV Continuous <Continuous>  dextrose 5%. 1000 milliLiter(s) IV Continuous <Continuous>  dextrose 5%. 1000 milliLiter(s) IV Continuous <Continuous>  dextrose 50% Injectable 12.5 Gram(s) IV Push once  dextrose 50% Injectable 25 Gram(s) IV Push once  dextrose 50% Injectable 25 Gram(s) IV Push once  dextrose Oral Gel 15 Gram(s) Oral once PRN  epoetin odalis (EPOGEN) Injectable 66519 Unit(s) SubCutaneous <User Schedule>  erythromycin   Ointment 1 Application(s) Left EYE four times a day  ferrous    sulfate Liquid 300 milliGRAM(s) Oral daily  glucagon  Injectable 1 milliGRAM(s) IntraMuscular once  heparin  Infusion 1050 Unit(s)/Hr IV Continuous <Continuous>  hydrocortisone sodium succinate Injectable 50 milliGRAM(s) IV Push every 8 hours  insulin lispro (ADMELOG) corrective regimen sliding scale   SubCutaneous every 6 hours  insulin NPH human recombinant 5 Unit(s) SubCutaneous every 6 hours  midodrine. 20 milliGRAM(s) Oral three times a day  mupirocin 2% Ointment 1 Application(s) Topical two times a day  norepinephrine Infusion 0.06 MICROgram(s)/kG/Min IV Continuous <Continuous>  ofloxacin 0.3% Solution 1 Drop(s) Left EYE every 2 hours  pantoprazole  Injectable 40 milliGRAM(s) IV Push two times a day  petrolatum Ophthalmic Ointment 1 Application(s) Right EYE two times a day  psyllium Powder 1 Packet(s) Oral daily  sodium chloride 2 Gram(s) Oral two times a day  sodium chloride 0.9% lock flush 10 milliLiter(s) IV Push every 1 hour PRN      Vital Signs Last 24 Hrs  T(C): 36.6 (28 Sep 2023 13:05), Max: 37.7 (28 Sep 2023 12:00)  T(F): 97.9 (28 Sep 2023 13:05), Max: 99.8 (28 Sep 2023 12:00)  HR: 92 (28 Sep 2023 14:00) (87 - 106)  BP: 107/47 (28 Sep 2023 14:00) (70/33 - 154/38)  BP(mean): 53 (28 Sep 2023 14:00) (29 - 106)  RR: 22 (28 Sep 2023 14:00) (16 - 27)  SpO2: 99% (28 Sep 2023 14:00) (94% - 100%)    Parameters below as of 28 Sep 2023 14:00  Patient On (Oxygen Delivery Method): ventilator    O2 Concentration (%): 60    Physical Exam:  General:    trached   ENT: L ear with crusted dark drainage  Respiratory:   trach on vent  abd:  distended but soft  :     no CVAT, no suprapubic tenderness, no willard  Musculoskeletal : generalized edema  Skin:    erythematous patchy rash on abd, chest, extremities slightly better  vascular: TRISHA odom                    8.2    11.41 )-----------( 343      ( 28 Sep 2023 02:15 )             26.1       09-28    131<L>  |  93<L>  |  82<H>  ----------------------------<  255<H>  4.2   |  23  |  2.36<H>    Ca    9.4      28 Sep 2023 02:15  Phos  3.6     09-28  Mg     2.30     09-28    TPro  6.1  /  Alb  2.5<L>  /  TBili  0.3  /  DBili  x   /  AST  37<H>  /  ALT  14  /  AlkPhos  379<H>  09-28      Urinalysis Basic - ( 28 Sep 2023 02:15 )    Color: x / Appearance: x / SG: x / pH: x  Gluc: 255 mg/dL / Ketone: x  / Bili: x / Urobili: x   Blood: x / Protein: x / Nitrite: x   Leuk Esterase: x / RBC: x / WBC x   Sq Epi: x / Non Sq Epi: x / Bacteria: x        MICROBIOLOGY:  v  Clean Catch Clean Catch (Midstream)  09-26-23   10,000 - 49,000 CFU/mL Gram positive organisms  --  --      .Blood Blood-Peripheral  09-26-23   No growth at 24 hours  --  --      .Blood Blood-Venous  09-20-23   No growth at 5 days  --  --      .Blood Blood-Peripheral  09-20-23   No growth at 5 days  --  --      Ear Ear  09-13-23   Moderate Candida albicans "Susceptibilities not performed"  --  --      .Blood Blood-Venous  09-10-23   No growth at 5 days  --  --      .Sputum Sputum  09-10-23   Normal Respiratory Cate present  --    Rare polymorphonuclear leukocytes per low power field  No Squamous epithelial cells per low power field  Rare Yeast like cells seen per oil power field  Rare Gram Positive Cocci in Clusters seen per oil power field      .Blood Blood-Peripheral  09-10-23   No growth at 5 days  --  --      .Sputum Sputum  09-08-23   Normal Respiratory Cate present  --    Numerous polymorphonuclear leukocytes per low power field  Numerous Squamous epithelial cells per low power field  Few Yeast like cells per oil power field  Results consistent with oropharyngeal contamination      .Blood Blood-Peripheral  09-08-23   No growth at 5 days  --  --      Ear Ear  09-06-23   No growth at 5 days  --  --      .Blood Blood-Peripheral  09-04-23   No growth at 5 days  --  --      .Bronchial Bronchial Lavage  09-01-23   Numerous Staphylococcus aureus Multiple Morphological Strains  Normal Respiratory Cate present  --  Staphylococcus aureus  Staphylococcus aureus      .Blood Blood-Peripheral  09-01-23   Growth in aerobic and anaerobic bottles: Staphylococcus aureus  Direct identification is available within approximately 3-5  hours either by Blood Panel Multiplexed PCR or Direct  MALDI-TOF. Details: [email or web address]  Growth in anaerobic bottle: blood culture bottle turned positive after  the final report was released.  --  Blood Culture PCR  Staphylococcus aureus      ET Tube ET Tube  09-01-23   Moderate Staphylococcus aureus  Moderate Escherichia coli ESBL  Normal Respiratory Cate present  --  Staphylococcus aureus  Escherichia coli ESBL          Rapid RVP Result: NotDete (09-26 @ 16:30)        RADIOLOGY:  Images independently visualized and reviewed personally, findings as below  < from: Xray Chest 1 View- PORTABLE-Urgent (Xray Chest 1 View- PORTABLE-Urgent .) (09.28.23 @ 01:16) >  IMPRESSION:  Bilateral layering effusions, right greater than left, that appears   similar as compared with 9/20/2023.    Perihilar interstitial opacities, likely pulmonary edema.    < end of copied text >  < from: CT Head No Cont (09.26.23 @ 21:02) >  IMPRESSION: Vague questionable areas of hypoattenuation within the   bilateral cerebellar hemispheres which may be compatible with   acute/subacute ischemia. Recommend further evaluation with a brain MRI   study, provided there are no MRI contraindications.    No acute intracranial hemorrhage.    Previously seen questionable vague wedge-shaped area of hypoattenuation   in the left parietal lobe with artifactual secondary to motion and volume   averaging with a prominent sulcus.    Chronic left occipital lobe infarct.    < end of copied text >  < from: CT Abdomen and Pelvis No Cont (09.26.23 @ 21:02) >  IMPRESSION:  Right upper lobe consolidation, unchanged. Left upper lobe consolidation   and tree-in-bud opacities and bilateral groundglass opacities, decreased.   Persistent interlobular septal thickening.    Bilateral pleural effusions with adjacent compressive atelectasis,   unchanged.    Small volume ascites.    Anasarca.    < end of copied text >

## 2023-09-28 NOTE — ADVANCED PRACTICE NURSE CONSULT - REASON FOR CONSULT
Patient seen on skin care rounds after wound care referral received for skin reassessment. Patient known to [facility] service line last seen on 9/22/2023. Chart reviewed since last assessment: WBC 11.41, H/H 8.2/26.1, INR <0.90, Platelets 343, hA1c 7.1, BMI 27.7, alesia.  Patient seen on skin care rounds after wound care referral received for skin reassessment. Patient known to [facility] service line last seen on 9/22/2023. Chart reviewed since last assessment: patient s/p bumetanide continuous drip 2/2 worsening fluid overload and uremia. Pt hypotensive and transferred to MICU for pressor support on 9/26, patient with worsening hypoxia, s/p bedside broncoscopy, trach exchanged. NGT placed 9/27. Patient on precedex and levophed infusion. WBC 11.41, H/H 8.2/26.1, INR <0.90, Platelets 343, hA1c 7.1, BMI 27.7, alesia 8.

## 2023-09-28 NOTE — PROGRESS NOTE ADULT - ASSESSMENT
72 y/o F well known to me from my [facility] outpt practice. she was admitted at [facility] 7/12-7/22 w aspiration PNA, was treated w CEFEPIME, developed an allergic rash,  dCHF, + MAC on AFB culture, had been progressively getting more and more lethargic and dyspneic at home since DC.   In  am of 8/11/23  ptn presented with respiratory distress w hypoxia and hypercarbia requiring intubation 2/2 volume overload +/- Asp PNA      Neuro   responds appropriately   Baseline MS AOx3, aphasic   - h/o CVA , on aspirin and statin . resumed w feeding tube, ASA resumed 8/26  - eeg  2/2 tremors, no sz focus  - less responsive in the past few days  - MRI 8/17:  new R cerebellar infarct, old left PCA/Occipital infarct. 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 Mcnulty, now in MICU  s/p septic shock overnight 9/1, now on meropenem, HDS  s/p trache, on vent support, copious secretions      Renal     Ptn well know to Dr. Colon,following   HD 8/19,  8/21, 8/26 and 8/28.  s/p PUF 8/29 2.5 Liters, HD 8/30,  9/1, 9/4  L IJ HD placed  uremic  hyponatremic  HD as per renal        Hypotension  - Levo drip  prognosis is poor  consider palliative care consult    GI  NPO  on tube feeds  coffee ground emesis x 1 8/24, melena overnight 8/31, s/p 1UPRBC, EGD/Colonoscopy: erosive gastropathy, esophagisits, on colonoscopy old blood seen no active bleeding, poor prep  family to decide re PEG placement    Endocrine  T2DM   A1C 7.1 in 7/2023   - BG goal 140-200     ID   No fever/leukocytosis, recheck temp   Hx latent TB which was treated, no concern for TB   - grew MAC on AFB culture from most recent admission. at [facility]  -MSSA Bacteremia 9/1, allergic to cephalosprins and PCN, on Vanco as per ID, renal dosing,   - sputum also grew E.Coli-ESBL, as per ID recs for  Bradford through 9/7( 1 week course as per ID) , afebrile.  - 9/8:  spike  temp 38.1C, has O. externa, has a wick, recs are to get a CT to R/O an abscess/bony involvement. cont Meropenem  9/9: ptn w fever overnight to 100.8,  may need shiley pulled if bacteremic, needs NECK CT as per ENT to R/O Mastoid bone involvement while having ongoing purulent DC from L ear 2/2 O. externa, getting daily wick changes  9/10:  spiked 102 overnight through Bradford and Vanco, purulent DC from O. externa, ENT recommend Ct of mastoid. ptn in HD, has Shiley in place, consider pulling shiley and send for Cx. would d/w Renal, and consider contacting  ID, last seen by Dr. Conner 9/6 9/11: remains febrile, Dr. Conner reconsulted. cont Bradford, Vanco  9/12: tmax 100.5, fever curve is improving, awaiting Left ear CT, on Bradford since 9/1. on  vanco for MSSA Bacteremia, does not tolerate cephalosporins. through 10/2  9/13: on full vent support via trache, appears uncomfortable and onur 2/2 Left O. externa. has an incidental left middle ear tumor, no acute middle ear interventions recommended, left ear wick replaced. on Meropenem, cx from Ear DC is neg. On Vanco for MSSA bacteremia through 10/2, course of Meropenem as per ID, afebrile in the past 24 hrs . Cardura for HTN resumed, HGB dropped to 6.4, got a dose of EPO and 1UPRBC, rpt 7.8, remains on HEPARIN drip for LEFT popliteal DVT  9/14: cont on Mupirocin locally to Left ear, cont IV Bradford, ENT signed off, afebrile x 48 hrs, Mro course to be determined by ID, on Vanco for MSSA bacteremia through 10/2. ongoing worsening hyponatremia, Hypertonic saline as per renal, no HD today, s/p 1UPRBC 9/13  9/15: spiking temps again, if cont to spike as per ID re PAN scan. cont Vanco for MSSA Bacteremia  9/16-18: no temp overnight  afebrile, cont to have Left ear DC,   on Vanco and ,bradford through 10/2  On IV Fluconazole for fungal cx+ from O. externa Discharge.   fluconazole started 9/21, ptn developed an allergic rash on 9/22. doubt its 2/2 to MEROPENEM or Vanco.  MEropenem was DCed 2/2 causing possible drug rash.  on VANCO based on levels for MSSA bacteremia but DCed 2/2 poss drug rash, now on Daptomycin      Heme/Onc  ppx: place on SCD  Transfuse for hgb 6.4, no obv bleed. HDS  LEFT POPLITEAL VEIN ACUTE DVT on 9/10, on Heparin drip    Ethics  GOC - Discussed GOC with daughter and , they have opted in the past for full code. and she remains full code at present      9/27:  In Micu, R IJ HD catheter placed, NGT in place, acidotic on VBG, trache to vent, anasarca, on IV BUMEX, on Levo, on Heparin drip, on stress dose HYdrocortisone, FS in range, uremic, awaiting HD, CT C/A/P w no acute findings. VANCO Dced , now on Dapto, for MSSA baceteremia through 10/2    9/28: HD started 9/27, still encephalopathic, fluconazole DCed as it could be the cause of the rash. on Dapto to complete a course of Abx for MSSA bacteremia through 10/2, VANCO DCed 2/2 possible rash. off pressors

## 2023-09-28 NOTE — PROGRESS NOTE ADULT - SUBJECTIVE AND OBJECTIVE BOX
Patient is a 73y old  Female who presents with a chief complaint of Respiratory distress (28 Sep 2023 15:48)      SUBJECTIVE / OVERNIGHT EVENTS: HD started 9/27, still encephalopathic, fluconazole DCed as it could be the cause of the rash. on Dapto to complete a course of Abx for MSSA bacteremia through 10/2, VANCO DCed 2/2 possible rash. off pressors    MEDICATIONS  (STANDING):  acetic acid 0.25% Topical Irrigation 1 Application(s) Topical two times a day  albuterol/ipratropium for Nebulization 3 milliLiter(s) Nebulizer every 6 hours  artificial tears (preservative free) Ophthalmic Solution 1 Drop(s) Right EYE every 2 hours  atorvastatin 40 milliGRAM(s) Oral at bedtime  buMETAnide Injectable 2 milliGRAM(s) IV Push <User Schedule>  chlorhexidine 0.12% Liquid 15 milliLiter(s) Oral Mucosa every 12 hours  chlorhexidine 2% Cloths 1 Application(s) Topical daily  chlorhexidine 4% Liquid 1 Application(s) Topical <User Schedule>  DAPTOmycin IVPB 500 milliGRAM(s) IV Intermittent every 48 hours  dexMEDEtomidine Infusion 0.3 MICROgram(s)/kG/Hr (4.99 mL/Hr) IV Continuous <Continuous>  dextrose 5%. 1000 milliLiter(s) (100 mL/Hr) IV Continuous <Continuous>  dextrose 5%. 1000 milliLiter(s) (50 mL/Hr) IV Continuous <Continuous>  dextrose 50% Injectable 25 Gram(s) IV Push once  dextrose 50% Injectable 25 Gram(s) IV Push once  dextrose 50% Injectable 12.5 Gram(s) IV Push once  epoetin odalis (EPOGEN) Injectable 83613 Unit(s) SubCutaneous <User Schedule>  erythromycin   Ointment 1 Application(s) Left EYE four times a day  ferrous    sulfate Liquid 300 milliGRAM(s) Oral daily  glucagon  Injectable 1 milliGRAM(s) IntraMuscular once  heparin  Infusion 1050 Unit(s)/Hr (9.5 mL/Hr) IV Continuous <Continuous>  hydrocortisone sodium succinate Injectable 50 milliGRAM(s) IV Push every 8 hours  insulin lispro (ADMELOG) corrective regimen sliding scale   SubCutaneous every 6 hours  insulin NPH human recombinant 5 Unit(s) SubCutaneous every 6 hours  midodrine. 20 milliGRAM(s) Oral three times a day  mupirocin 2% Ointment 1 Application(s) Topical two times a day  norepinephrine Infusion 0.06 MICROgram(s)/kG/Min (7.48 mL/Hr) IV Continuous <Continuous>  ofloxacin 0.3% Solution 1 Drop(s) Left EYE every 2 hours  pantoprazole  Injectable 40 milliGRAM(s) IV Push two times a day  petrolatum Ophthalmic Ointment 1 Application(s) Right EYE two times a day  psyllium Powder 1 Packet(s) Oral daily  sodium chloride 2 Gram(s) Oral two times a day    MEDICATIONS  (PRN):  acetaminophen   Oral Liquid .. 650 milliGRAM(s) Oral every 6 hours PRN Temp greater or equal to 38C (100.4F), Mild Pain (1 - 3)  artificial tears (preservative free) Ophthalmic Solution 1 Drop(s) Both EYES three times a day PRN Dry Eyes  calamine/zinc oxide Lotion 1 Application(s) Topical two times a day PRN Rash and/or Itching  dextrose Oral Gel 15 Gram(s) Oral once PRN Blood Glucose LESS THAN 70 milliGRAM(s)/deciliter  sodium chloride 0.9% lock flush 10 milliLiter(s) IV Push every 1 hour PRN Pre/post blood products, medications, blood draw, and to maintain line patency      Vital Signs Last 24 Hrs  T(F): 97.9 (09-28-23 @ 13:05), Max: 99.8 (09-28-23 @ 12:00)  HR: 98 (09-28-23 @ 16:00) (87 - 106)  BP: 91/41 (09-28-23 @ 16:00) (70/33 - 154/38)  RR: 22 (09-28-23 @ 16:00) (16 - 27)  SpO2: 98% (09-28-23 @ 16:00) (94% - 100%)  Telemetry:   CAPILLARY BLOOD GLUCOSE      POCT Blood Glucose.: 312 mg/dL (28 Sep 2023 11:30)  POCT Blood Glucose.: 253 mg/dL (28 Sep 2023 05:32)  POCT Blood Glucose.: 232 mg/dL (27 Sep 2023 23:10)  POCT Blood Glucose.: 158 mg/dL (27 Sep 2023 18:50)    I&O's Summary    27 Sep 2023 07:01  -  28 Sep 2023 07:00  --------------------------------------------------------  IN: 2036.1 mL / OUT: 2070 mL / NET: -33.9 mL    28 Sep 2023 07:01  -  28 Sep 2023 16:55  --------------------------------------------------------  IN: 530.5 mL / OUT: 85 mL / NET: 445.5 mL        PHYSICAL EXAM:  GENERAL: NAD, well-developed  HEAD:  Atraumatic, Normocephalic  EYES: EOMI, PERRLA, conjunctiva and sclera clear  NECK: Supple, No JVD  CHEST/LUNG: Clear to auscultation bilaterally; No wheeze  HEART: Regular rate and rhythm; No murmurs, rubs, or gallops  ABDOMEN: Soft, Nontender, Nondistended; Bowel sounds present  EXTREMITIES:  2+ Peripheral Pulses, No clubbing, cyanosis, or edema  PSYCH: AAOx3  NEUROLOGY: non-focal  SKIN: No rashes or lesions    LABS:                        8.2    11.41 )-----------( 343      ( 28 Sep 2023 02:15 )             26.1     09-28    131<L>  |  93<L>  |  82<H>  ----------------------------<  255<H>  4.2   |  23  |  2.36<H>    Ca    9.4      28 Sep 2023 02:15  Phos  3.6     09-28  Mg     2.30     09-28    TPro  6.1  /  Alb  2.5<L>  /  TBili  0.3  /  DBili  x   /  AST  37<H>  /  ALT  14  /  AlkPhos  379<H>  09-28    PTT - ( 28 Sep 2023 14:00 )  PTT:99.5 sec  CARDIAC MARKERS ( 27 Sep 2023 02:50 )  x     / x     / 46 U/L / x     / x          Urinalysis Basic - ( 28 Sep 2023 02:15 )    Color: x / Appearance: x / SG: x / pH: x  Gluc: 255 mg/dL / Ketone: x  / Bili: x / Urobili: x   Blood: x / Protein: x / Nitrite: x   Leuk Esterase: x / RBC: x / WBC x   Sq Epi: x / Non Sq Epi: x / Bacteria: x        RADIOLOGY & ADDITIONAL TESTS:    Imaging Personally Reviewed:    Consultant(s) Notes Reviewed:      Care Discussed with Consultants/Other Providers:

## 2023-09-28 NOTE — PROGRESS NOTE ADULT - ASSESSMENT
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 While in MICU patient noted with worsening renal failure requiring HD initiation, CGE/ Melena s/p EGD 8/31 found with esophagitis and erosive gastropathy, new right cerebellar infarct and fevers second to ESBL COLI and MSSA VAP, MSSA bacteremia, left ear otitis externa and drug rxn to cephalosporins Course further complicated by prolonged vent time s/p tracheostomy 9/1 and transferred to RCU 9/3 completed by recurrent fevers with high PIP, respiratory acidosis and concern for volume overloaded.     NEUROLOGY  # Metabolic Encephalopath vs NEW cerebella infarct   - Baseline MS AOX3 with short term memory changes per family however noted with concern for poor mentation in ICU  - MRI (8/17) with NEW right cerebellar infarct and old left PCA/occipital infarcts  - STEPHANIE (8/17) with AV showing two linear mobile echogenic elements attached to valve leaflets consistent with Lambel's excrescence, but no TRINITY thrombus, and lambel's excrescence with uncertain clinical implication.   - EEG (8/11-8/15) with no seizures   - ILR interrogation (9/7) with SR and PACs falsely recorded as AF.   - Attempted to resume ASA for medical management but held given GIB   - Continue on Lipitor for medical management   - Course complicated by questionable head and body shaking 9/8 however prolactin elevated and shakes head yes/no to some questions.   - rCTH 9/26- Vague questionable areas of hypoattenuation within the bilateral cerebellar hemispheres which may be compatible with acute/subacute ischemia. Recommend further evaluation with a brain MRI study, provided there are no MRI contraindications. No acute intracranial hemorrhage. Previously seen questionable vague wedge-shaped area of hypoattenuation in the left parietal lobe with artifactual secondary to motion and volume averaging with a prominent sulcus. Chronic left occipital lobe infarct.  - f/up neuro consult recs     CARDIOVASCULAR  # HTN Urgency   # Septic vs vasoplegic shock   - continue levophed, goal SBP >100  - Holding Labetalol , Catapres , Cardura   - started stress does steroids 9/27 -      # Hx of HFpEF with likely ADHF with volume overload.   - ECHO (7/2023) with EF 59 with severe LVH and diastolic dysfunction   - STEPHANIE (8/18) with normal LVRVSF however noted with increased LA pressures and persistent LA septal bowing into RA.   - ECHO (8/11) with EF 60 with mild LVH, normal LVRVSF, and mild ddfxn.  - Remove volume with HD sessions and intermittent bumex pushes as BP allows    - s/p Bumex 2mg IV BID/bumex gtt, ordered for bumex 2mg q6hrs w/ minimal improvement in UOP. -  plan for HD today    HEENT   # Left ear OE   - ENT evaluation (9/7) with no mastoid tenderness, however found with positive swelling and excoriation to left auricle and tenderness to manipulation of auricle. Debrided crusting of auricle and EAC evaluated with edema and unable to visualize TM. EAC debrided and found with watery exudate.   - s/p CiproHC (9/5 - 9/16)   - Bradford discontinued. Rash on torso concerning for possible drug rash  - ENT following and ear wick change QD   - Wick out but needs ? Debridement wound care called/fu ent   - ENT recommending CT IAC w/ IV con but family is refusing as concerned given CKD, kidneys wouldn't recover from IV contrast. Spoke with Nephrology, ENT, and patient's  at length. Planned for CT non con and per , decision will be made from there but for now doesn't want to risk further harming kidneys.  - CT IAC showing acute on chronic left-sided otitis externa and acute on chronic left-sided otomastoiditis. Superimposed left-sided tympanosclerosis. Superimposed cholesteatoma formation within the left tympanomastoid cavity cannot be excluded. No evidence of malignant otitis externa.  - ENT consulted (9/20) for white purulent discharge from left ear, recc: ENT:  acetic acid drops BID to left ear. Mupirocin ointment to the left pinna BID, cover with xeroform after placing mupirocin ointment. Pressure offloading of the left ear.  + L eye redness in setting of surrounding infectious process - Ophthalmology consulted via team, recs pending     # Oral Lesions with questionable Zoster vs Trauma?   - Patient with tongue pain and seen with anterior ulcerations consolidating and crusting and posterior ulceration.  - Case discussed with pathology resident and culture/ cytology sent.   - HSV negative and Path (9/6) with normal appearing epithelial cells singly and in clusters devoid of viral cytopathic effect.   - Continue on magic mouth wash for pain    RESPIRATORY  # Acute hypoxic/hypercapnic Respiratory failure likely iso volume overload   - Hx of CKD and HFpEF presented with SOB and hypercarbia second to volume overload requiring intubation and HD initiation   - Vent weaning attempted however limited requiring tracheostomy (9/1) with IP   - Course complicated by PIPs elevated (50s) and respiratory acidosis second to secretions vs volume ?    - Vent settings: 20/300/10/60%  - ABG - ( 27 Sep 2023 12:50 ) pH, Arterial: 7.19  pH, Blood: x     /  pCO2: 64    /  pO2: 90    / HCO3: 24    / Base Excess: -4.0  /  SaO2: 98.9    - POCUS (9/8) with BL pleural effusions (large on right and small on left)   - CT CHEST (9/8) with TBM, BL pleural effusions and continued consolidations   - (9/27) Over the past 24 hrs, pt with increasing O2 requirements and respiratory acidosis with poor TVs. Bedside bronchoscopy (+) severe dynamic airway collapse into distal end of tracheostomy extending to main ines and RM bronchus.  tracheostomy exchanged at bedside to size 6dXLT with repeat bronchoscopy showing some but not complete improvement in dynamic collapse.   - Continue volume removal , plan for HD today  - consider Thoracentesis if oxygenation does not improve w/ fluid removal  - Continue on Duoneb and HTS for airway clearance        GI  # UGIB   - CGE x 1 episode on 8/24 and melena x 2 episodes on 8/31 likely residual blood.   - EGD (8/31) found with esophagitis and erosive gastropathy  - Continue on PPI BID through late October and then PPI QD   - No melena     # Dysphagia   - NGT-TF continued , changed to R nostril today given infections on L side  - Pending PEG however needs improvement in infectious status prior to placement.     RENAL  # Progressive CKD   - Presented volume overload and progressive RUSS on CKD (baseline CRE in 2s and mode into the 3s on admission) likely obstruction vs low flow state with shock vs AIN from drug reaction issue?  - POCUS with no signs of hydronephrosis   - Pressor support started with improvement in renal function  - Attempted steroid course in ICU without improvement in renal function   - HD inititated in ICU for volume overload, held 9/20 iso malfunctioning shiley and stable labs/vol status   - now w/ worsening hypoxia/respiratory acidosis today, L IJ shiley placed 9/27 . d/w nephro , will attempt HD today.  - Monitor renal function and UOP, and electrolytes for now  - Monitor urine output - willard placed      Hyponatremia   - c/w Salt tabs - s/p Hypertonic 1.5%NS @ 50cc/hr x 5 hr    # Hyperphosphatemia (resolved)   - PTH normal and elevated phos likely from renal failure  - s/p Phos binder with Renvela - now d'beth as level wnl      INFECTIOUS DISEASE  # ESBL ECOLI and MSSA VAP c/b MSSA bacteremia   - RVP/ COVID (8/11) negative   - SCx (8/11) with MSSA s/p cefepime (8/12-8/13) and then ancef (8/14) however noted with drug reaction and then changed to vanco and aztreonam (8/12-8/13) in ICU .   - Course complicated by recurrent fevers and return of MSSA with bacteremia.   - SCx (9/1) with MSSA and ESBL ECOLI   - BAL (9/1) with MSSA   - BCx (9/1) with MSSA and cleared on (9/4)   - ECHO (9/6) unable to rule out endocarditis   - Continue on Bradford (9/4 - ) and Vanco BY DOSE POST HD (9/4, 9/7, 9/9 ...) with duration TBD at this time.   - Given inability to rule out endocarditis will discuss possible 4 wks with ID?   - Course complicated by fever (9/7) and UA with leuks but no bacteria, however compared to prior improved, RVP/COVID and SCx negative, and RPT CXR similar to prior   - LE DOPPLERS- Blood and sputum cx NTD; Acute left deep vein thrombosis of the left popliteal vein.  - Febrile overnight 102 recultured and sent off   -Pt receiving vanco post HD however today given vanco 750mg x 1 will check Vanco level at end of HD session and dose   - BCx (9/20): sent- NTD  - ID recc CT C/A/P w/ IV contrast - on hold d/t unsure if pt tolerate HD alissa  - C/w fluconazole 200 qd (9/21)    # Left ear OE   - ENT evaluation (9/7) with no mastoid tenderness, however found with positive swelling and excoriation to left auricle and tenderness to manipulation of auricle. Debrided crusting of auricle and EAC evaluated with edema and unable to visualize TM. EAC debrided and found with watery exudate.   - s/p CiproHC (9/5 - 9/16)     # MRSA PCRs   - MRSA PCR (8/11 and 8/14) negative   - Next MRSA + PCR ordered for 10/8     HEME  # Anemia second to GIB vs renal disease   - Hold chemical DVT PPX given bleeding/ waxing and waning HH   - s/p multiple units of PRBCs (8/15, 8/21, 8/28, 8/31 and 9/8)   - Anemia panel with AOCD but with low %sat and ferrous sulfate added to optimize   - Monitor HH and discuss with renal for EPO sometime next week.   - Spoke with GI for clearance to start Heparin gtt for + DVT   - c/w Heparin gtt (9/21-)    Vascular  # DVT  - Pt with + popliteal DVT on SCD Spoke with GI no absolute contraindication for starting Heparin - advise close monitoring for bleeding - Do stat CTA if bleeding occurs and call IR   - Pt specific heparin gtt started with goal PTT 60-85  - will monitor closely   - c/w Heparin gtt (9/21)    ONC   # Hx of Breast CA   - Patient dx in 2018 and s/p BL mastectomy (radical on right) and chemo and RT.   - Supportive care.     ENDOCRINE  # IDDM2   - Continue on NPH 3U and ISS   - Monitor FS and adjust as needed   - started stress dose steroids 9/27, monitor FS     LINES/ TUBES  - R IJ SHILEY (8/19 - 9/21)   - L IJ shifabi (9/27 - )    SKIN  # Drug Eruption   - Attempted cefepime (8/12) vs ancef (8/13-8/14) and then noted with drug rxn.   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 While in MICU patient noted with worsening renal failure requiring HD initiation, CGE/ Melena s/p EGD 8/31 found with esophagitis and erosive gastropathy, new right cerebellar infarct and fevers second to ESBL COLI and MSSA VAP, MSSA bacteremia, left ear otitis externa and drug rxn to cephalosporins Course further complicated by prolonged vent time s/p tracheostomy 9/1 and transferred to RCU 9/3 completed by recurrent fevers with high PIP, respiratory acidosis and concern for volume overloaded.     NEUROLOGY  # Metabolic Encephalopath vs NEW cerebella infarct   - Baseline MS AOX3 with short term memory changes per family however noted with concern for poor mentation in ICU  - MRI (8/17) with NEW right cerebellar infarct and old left PCA/occipital infarcts  - STEPHANIE (8/17) with AV showing two linear mobile echogenic elements attached to valve leaflets consistent with Lambel's excrescence, but no TRINITY thrombus, and lambel's excrescence with uncertain clinical implication.   - EEG (8/11-8/15) with no seizures   - ILR interrogation (9/7) with SR and PACs falsely recorded as AF.   - Attempted to resume ASA for medical management but held given GIB   - Continue on Lipitor for medical management   - Course complicated by questionable head and body shaking 9/8 prolactin elevated, however was shaking head yes/no to some questions.   - rCTH 9/26 for worsening encephalopathy-  Vague questionable areas of hypoattenuation within the bilateral cerebellar hemispheres which may be compatible with acute/subacute ischemia. Recommend further evaluation with a brain MRI study, provided there are no MRI contraindications. No acute intracranial hemorrhage. Previously seen questionable vague wedge-shaped area of hypoattenuation in the left parietal lobe with artifactual secondary to motion and volume averaging with a prominent sulcus. Chronic left occipital lobe infarct.  - neuro consulted- reccs: New CTH from 9/26 without any evidence of acute infarct -> can consider repeat CTH down the line in mental status does not improve with treatment of underlying metabolic derangements and infections.    CARDIOVASCULAR  # HTN Urgency   # Septic vs vasoplegic shock   - weaned off levophed overnight. may require restarting during HD session today.   - Holding Labetalol , Catapres , Cardura   - started stress does steroids (9/27 - )    # Hx of HFpEF with likely ADHF with volume overload.   - ECHO (7/2023) with EF 59 with severe LVH and diastolic dysfunction   - STEPHANIE (8/18) with normal LVRVSF however noted with increased LA pressures and persistent LA septal bowing into RA.   - ECHO (8/11) with EF 60 with mild LVH, normal LVRVSF, and mild ddfxn.  - Remove volume with HD sessions and intermittent bumex pushes as BP allows    - s/p Bumex 2mg IV BID/bumex gtt/bumex 2mg q6hrs w/ minimal improvement in UOP. -  plan for second HD session today    HEENT   # Left ear OE   - ENT evaluation (9/7) with no mastoid tenderness, however found with positive swelling and excoriation to left auricle and tenderness to manipulation of auricle. Debrided crusting of auricle and EAC evaluated with edema and unable to visualize TM. EAC debrided and found with watery exudate.   - s/p CiproHC (9/5 - 9/16)   - Bradford discontinued. Rash on torso concerning for possible drug rash  - ENT following and ear wick change QD   - Wick out but needs ? Debridement wound care called/fu ent   - ENT recommending CT IAC w/ IV con but family is refusing as concerned given CKD, kidneys wouldn't recover from IV contrast. Spoke with Nephrology, ENT, and patient's  at length. Planned for CT non con and per , decision will be made from there but for now doesn't want to risk further harming kidneys.  - CT IAC showing acute on chronic left-sided otitis externa and acute on chronic left-sided otomastoiditis. Superimposed left-sided tympanosclerosis. Superimposed cholesteatoma formation within the left tympanomastoid cavity cannot be excluded. No evidence of malignant otitis externa.  - ENT consulted (9/20) for white purulent discharge from left ear, recc: ENT:  acetic acid drops BID to left ear. Mupirocin ointment to the left pinna BID, cover with xeroform after placing mupirocin ointment. Pressure offloading of the left ear.  + L eye redness in setting of surrounding infectious process - Ophthalmology consulted via team, recs pending     # Oral Lesions with questionable Zoster vs Trauma?   - Patient with tongue pain and seen with anterior ulcerations consolidating and crusting and posterior ulceration.  - Case discussed with pathology resident and culture/ cytology sent.   - HSV negative and Path (9/6) with normal appearing epithelial cells singly and in clusters devoid of viral cytopathic effect.   - Continue on magic mouth wash for pain    RESPIRATORY  # Acute hypoxic/hypercapnic Respiratory failure likely iso volume overload   - Hx of CKD and HFpEF presented with SOB and hypercarbia second to volume overload requiring intubation and HD initiation   - Vent weaning attempted however limited requiring tracheostomy (9/1) with IP   - Course complicated by PIPs elevated (50s) and respiratory acidosis second to secretions vs volume ?    - Vent settings: 20/300/8/60%  - ABG - ( 28 Sep 2023 02:15 ) pH, Arterial: 7.27  pH, Blood: x     /  pCO2: 55    /  pO2: 53    / HCO3: 25    / Base Excess: -1.9  /  SaO2: 88.7    - POCUS (9/8) with BL pleural effusions (large on right and small on left)   - CT CHEST (9/8) with TBM, BL pleural effusions and continued consolidations   - (9/27) Over the past 24 hrs, pt with increasing O2 requirements and respiratory acidosis with poor TVs. Bedside bronchoscopy (+) severe dynamic airway collapse into distal end of tracheostomy extending to main ines and RM bronchus.  tracheostomy exchanged at bedside to size 6dXLT with repeat bronchoscopy showing some but not complete improvement in dynamic collapse.   - Continue volume removal , plan for HD today  - consider Thoracentesis if oxygenation does not improve w/ fluid removal  - Continue on Duoneb and HTS for airway clearance        GI  # UGIB   - CGE x 1 episode on 8/24 and melena x 2 episodes on 8/31 likely residual blood.   - EGD (8/31) found with esophagitis and erosive gastropathy  - Continue on PPI BID through late October and then PPI QD   - No melena     # Dysphagia   - NGT-TF continued , changed to R nostril today given infections on L side  - Pending PEG however needs improvement in infectious status prior to placement.     RENAL  # Progressive CKD   - Presented volume overload and progressive RUSS on CKD (baseline CRE in 2s and mode into the 3s on admission) likely obstruction vs low flow state with shock vs AIN from drug reaction issue?  - POCUS with no signs of hydronephrosis   - Pressor support started with improvement in renal function  - Attempted steroid course in ICU without improvement in renal function   - HD inititated in ICU for volume overload, held 9/20 iso malfunctioning shiley and stable labs/vol status   - now w/ worsening hypoxia/respiratory acidosis today, L IJ shiley placed 9/27 . d/w nephro , will attempt HD today.  - Monitor renal function and UOP, and electrolytes for now  - Monitor urine output - willard placed      Hyponatremia   - c/w Salt tabs - s/p Hypertonic 1.5%NS @ 50cc/hr x 5 hr    # Hyperphosphatemia (resolved)   - PTH normal and elevated phos likely from renal failure  - s/p Phos binder with Renvela - now d'beth as level wnl      INFECTIOUS DISEASE  # ESBL ECOLI and MSSA VAP c/b MSSA bacteremia   - RVP/ COVID (8/11) negative   - SCx (8/11) with MSSA s/p cefepime (8/12-8/13) and then ancef (8/14) however noted with drug reaction and then changed to vanco and aztreonam (8/12-8/13) in ICU .   - Course complicated by recurrent fevers and return of MSSA with bacteremia.   - SCx (9/1) with MSSA and ESBL ECOLI   - BAL (9/1) with MSSA   - BCx (9/1) with MSSA and cleared on (9/4)   - ECHO (9/6) unable to rule out endocarditis   - Continue on Bradford (9/4 - ) and Vanco BY DOSE POST HD (9/4, 9/7, 9/9 ...) with duration TBD at this time.   - Given inability to rule out endocarditis will discuss possible 4 wks with ID?   - Course complicated by fever (9/7) and UA with leuks but no bacteria, however compared to prior improved, RVP/COVID and SCx negative, and RPT CXR similar to prior   - LE DOPPLERS- Blood and sputum cx NTD; Acute left deep vein thrombosis of the left popliteal vein.  - Febrile overnight 102 recultured and sent off   -Pt receiving vanco post HD however today given vanco 750mg x 1 will check Vanco level at end of HD session and dose   - BCx (9/20): sent- NTD  - ID recc CT C/A/P w/ IV contrast - on hold d/t unsure if pt tolerate HD alissa  - C/w fluconazole 200 qd (9/21)    # Left ear OE   - ENT evaluation (9/7) with no mastoid tenderness, however found with positive swelling and excoriation to left auricle and tenderness to manipulation of auricle. Debrided crusting of auricle and EAC evaluated with edema and unable to visualize TM. EAC debrided and found with watery exudate.   - s/p CiproHC (9/5 - 9/16)     # MRSA PCRs   - MRSA PCR (8/11 and 8/14) negative   - Next MRSA + PCR ordered for 10/8     HEME  # Anemia second to GIB vs renal disease   - Hold chemical DVT PPX given bleeding/ waxing and waning HH   - s/p multiple units of PRBCs (8/15, 8/21, 8/28, 8/31 and 9/8)   - Anemia panel with AOCD but with low %sat and ferrous sulfate added to optimize   - Monitor HH and discuss with renal for EPO sometime next week.   - Spoke with GI for clearance to start Heparin gtt for + DVT   - c/w Heparin gtt (9/21-)    Vascular  # DVT  - Pt with + popliteal DVT on SCD Spoke with GI no absolute contraindication for starting Heparin - advise close monitoring for bleeding - Do stat CTA if bleeding occurs and call IR   - Pt specific heparin gtt started with goal PTT 60-85  - will monitor closely   - c/w Heparin gtt (9/21)    ONC   # Hx of Breast CA   - Patient dx in 2018 and s/p BL mastectomy (radical on right) and chemo and RT.   - Supportive care.     ENDOCRINE  # IDDM2   - Continue on NPH 3U and ISS   - Monitor FS and adjust as needed   - started stress dose steroids 9/27, monitor FS     LINES/ TUBES  - R ARTHUR TAYLOR (8/19 - 9/21)   - L ARTHUR taylor (9/27 - )    SKIN  # Drug Eruption   - Attempted cefepime (8/12) vs ancef (8/13-8/14) and then noted with drug rxn.   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 While in MICU patient noted with worsening renal failure requiring HD initiation, CGE/ Melena s/p EGD 8/31 found with esophagitis and erosive gastropathy, new right cerebellar infarct and fevers second to ESBL COLI and MSSA VAP, MSSA bacteremia, left ear otitis externa and drug rxn to cephalosporins Course further complicated by prolonged vent time s/p tracheostomy 9/1 and transferred to RCU 9/3 completed by recurrent fevers with high PIP, respiratory acidosis and concern for volume overloaded.     NEUROLOGY  # Metabolic Encephalopath vs NEW cerebella infarct   - Baseline MS AOX3 with short term memory changes per family however noted with concern for poor mentation in ICU  - MRI (8/17) with NEW right cerebellar infarct and old left PCA/occipital infarcts  - STEPHANIE (8/17) with AV showing two linear mobile echogenic elements attached to valve leaflets consistent with Lambel's excrescence, but no TRINITY thrombus, and lambel's excrescence with uncertain clinical implication.   - EEG (8/11-8/15) with no seizures   - ILR interrogation (9/7) with SR and PACs falsely recorded as AF.   - Attempted to resume ASA for medical management but held given GIB   - Continue Lipitor   - Course complicated by questionable head and body shaking 9/8 prolactin elevated, however was shaking head yes/no to some questions.   - rCTH 9/26 for worsening encephalopathy-  Vague questionable areas of hypoattenuation within the bilateral cerebellar hemispheres which may be compatible with acute/subacute ischemia. Recommend further evaluation with a brain MRI study, provided there are no MRI contraindications. No acute intracranial hemorrhage. Previously seen questionable vague wedge-shaped area of hypoattenuation in the left parietal lobe with artifactual secondary to motion and volume averaging with a prominent sulcus. Chronic left occipital lobe infarct.  - neuro consulted- reccs: New CTH from 9/26 without any evidence of acute infarct -> can consider repeat CTH down the line in mental status does not improve with treatment of underlying metabolic derangements and infections.    CARDIOVASCULAR  # HTN Urgency   # Septic vs vasoplegic shock   - weaned off levophed overnight. may require restarting during HD session today.   - Holding Labetalol , Catapres , Cardura   - started stress does steroids (9/27 - )    # Hx of HFpEF with likely ADHF with volume overload.   - ECHO (7/2023) with EF 59 with severe LVH and diastolic dysfunction   - STEPHANIE (8/18) with normal LVRVSF however noted with increased LA pressures and persistent LA septal bowing into RA.   - ECHO (8/11) with EF 60 with mild LVH, normal LVRVSF, and mild ddfxn.  - Remove volume with HD sessions and intermittent bumex pushes as BP allows    - s/p Bumex 2mg IV BID/bumex gtt/bumex 2mg q6hrs w/ minimal improvement in UOP. -  plan for second HD session today    HEENT   # Left ear OE   - ENT evaluation (9/7) with no mastoid tenderness, however found with positive swelling and excoriation to left auricle and tenderness to manipulation of auricle. Debrided crusting of auricle and EAC evaluated with edema and unable to visualize TM. EAC debrided and found with watery exudate.   - s/p CiproHC (9/5 - 9/16)   - Ana Cristina discontinued. Rash on torso concerning for possible drug rash  - ENT following and ear wick change QD   - Wick out but needs ? Debridement wound care called/fu ent   - ENT recommending CT IAC w/ IV con but family is refusing as concerned given CKD, kidneys wouldn't recover from IV contrast. Spoke with Nephrology, ENT, and patient's  at length. Planned for CT non con and per , decision will be made from there but for now doesn't want to risk further harming kidneys.  - CT IAC showing acute on chronic left-sided otitis externa and acute on chronic left-sided otomastoiditis. Superimposed left-sided tympanosclerosis. Superimposed cholesteatoma formation within the left tympanomastoid cavity cannot be excluded. No evidence of malignant otitis externa.  - ENT consulted (9/20) for white purulent discharge from left ear, recc: ENT: acetic acid drops BID to left ear. Mupirocin ointment to the left pinna BID, cover with xeroform after placing mupirocin ointment. Pressure offloading of the left ear.  + L eye redness in setting of surrounding infectious process - Ophthalmology consulted via team, recs pending     # Oral Lesions with questionable Zoster vs Trauma?   - Patient with tongue pain and seen with anterior ulcerations consolidating and crusting and posterior ulceration.  - Case discussed with pathology resident and culture/ cytology sent.   - HSV negative and Path (9/6) with normal appearing epithelial cells singly and in clusters devoid of viral cytopathic effect.   - Continue on magic mouth wash for pain    RESPIRATORY  # Acute hypoxic/hypercapnic Respiratory failure likely iso volume overload   - Hx of CKD and HFpEF presented with SOB and hypercarbia second to volume overload requiring intubation and HD initiation   - Vent weaning attempted however limited requiring tracheostomy (9/1) with IP   - Course complicated by PIPs elevated (50s) and respiratory acidosis second to secretions vs volume ?    - Vent settings: 23/340/8/60%  - ABG - ( 28 Sep 2023 02:15 ) pH, Arterial: 7.27  pH, Blood: x     /  pCO2: 55    /  pO2: 53    / HCO3: 25    / Base Excess: -1.9  /  SaO2: 88.7    - POCUS (9/8) with BL pleural effusions (large on right and small on left)   - CT CHEST (9/8) with TBM, BL pleural effusions and continued consolidations   - (9/27) Over the past 24 hrs, pt with increasing O2 requirements and respiratory acidosis with poor TVs. Bedside bronchoscopy (+) severe dynamic airway collapse into distal end of tracheostomy extending to main ines and RM bronchus.  tracheostomy exchanged at bedside to size 6dXLT with repeat bronchoscopy showing some but not complete improvement in dynamic collapse.   - Continue volume removal , plan for repeat HD session today  - Continue on Duoneb for airway clearance        GI  # UGIB   - CGE x 1 episode on 8/24 and melena x 2 episodes on 8/31 likely residual blood.   - EGD (8/31) found with esophagitis and erosive gastropathy  - Continue on PPI BID through late October and then PPI QD   - No melena     # Dysphagia   - NGT-TF continued , changed to R nostril today given infections on L side  - Pending PEG however needs improvement in infectious status prior to placement.     RENAL  # Progressive CKD   - Presented volume overload and progressive RUSS on CKD (baseline CRE in 2s and mode into the 3s on admission) likely obstruction vs low flow state with shock vs AIN from drug reaction issue?  - POCUS with no signs of hydronephrosis   - Pressor support started with improvement in renal function  - Attempted steroid course in ICU without improvement in renal function   - HD inititated in ICU for volume overload, held 9/20 iso malfunctioning shiley and stable labs/vol status   - iso worsening hypoxia/respiratory acidosis, L IJ shiley placed 9/27 and HD restarted. s/p 1.3L fluid removal 9/27. plan for repeat HD session today.   - renal following  - Monitor renal function and UOP, and electrolytes   - Monitor urine output - willard placed on icu admission, consider D/c tomorrow.       Hyponatremia   - c/w Salt tabs - s/p Hypertonic 1.5%NS @ 50cc/hr x 5 hr    # Hyperphosphatemia (resolved)   - PTH normal and elevated phos likely from renal failure  - s/p Phos binder with Renvela - now d'beth as level wnl      INFECTIOUS DISEASE  # ESBL ECOLI and MSSA VAP c/b MSSA bacteremia   - Course complicated by recurrent fevers   - RVP/ COVID (8/11) negative   - SCx (8/11) with MSSA s/p cefepime (8/12-8/13) and then ancef (8/14) however noted with drug reaction and then changed to vanco and aztreonam (8/12-8/13) in ICU .   - SCx (9/1) with MSSA and ESBL ECOLI   - BAL (9/1) with MSSA   - BCx (9/1) with MSSA and cleared on (9/4)   - ECHO (9/6) unable to rule out endocarditis   - LE DOPPLERS- Blood and sputum cx NTD; Acute left deep vein thrombosis of the left popliteal vein.  - s/p Ana Cristina (9/4 - 9/22 ) and Vanco by level (last dose 9/22 ) , pt noted w/ new erythematous patchy rash, did not improve after discontinuing the ana cristina, so vanco switched to changed to Daptomycin per ID recs (9/26-)  - s/p fluconazole 200 qd (9/219/28) Dc'd per ID recs given family states rash appeared post fluconazole initiation  - BCx 9/26- NTD, Urine Cx 9/26- NTD, MRSA pcr+ staph    # Left ear OE   - ENT evaluation (9/7) with no mastoid tenderness, however found with positive swelling and excoriation to left auricle and tenderness to manipulation of auricle. Debrided crusting of auricle and EAC evaluated with edema and unable to visualize TM. EAC debrided and found with watery exudate.   - s/p CiproHC (9/5 - 9/16)     # MRSA PCRs   - MRSA PCR (8/11 and 8/14) negative   - Next MRSA + PCR ordered for 10/8     HEME  # Anemia second to GIB vs renal disease   - Hold chemical DVT PPX given bleeding/ waxing and waning HH   - s/p multiple units of PRBCs (8/15, 8/21, 8/28, 8/31 and 9/8)   - Anemia panel with AOCD but with low %sat and ferrous sulfate added to optimize   - Monitor HH and discuss with renal for EPO sometime next week.   - Spoke with GI for clearance to start Heparin gtt for + DVT   - c/w Heparin gtt (9/21-)    Vascular  # DVT  - Pt with + popliteal DVT on SCD Spoke with GI no absolute contraindication for starting Heparin - advise close monitoring for bleeding - Do stat CTA if bleeding occurs and call IR   - c/w Heparin gtt (9/21-)    ONC   # Hx of Breast CA   - Patient dx in 2018 and s/p BL mastectomy (radical on right) and chemo and RT.   - Supportive care.     ENDOCRINE  # IDDM2   - Continue on NPH 3U and ISS   - Monitor FS and adjust as needed   - started stress dose steroids 9/27, monitor FS     LINES/ TUBES  - R IJ SHILEY (8/19 - 9/21)   - L IJ shiley (9/27 - )    SKIN  # Drug Eruption   - Attempted cefepime (8/12) vs ancef (8/13-8/14) and then noted with drug rxn.   - Hold further cephalosporins at this time   - s/p Steroids with prednisone 40 (8/18 - 9/2) then prednisone 30 (9/3-9/7), then prednisone 20 (9/8 - 9/10), then prednisone 10mg (9/11-9/13)        ETHICS/ [facility]    - FULL CODE      72 YO F with PMHx of IDDM, HTN, CKD, HFpEF, Breast Cancer s/p BL mastectomy, chemotherapy and radiation (2018), Respiratory and Cardiac Arrest (2018), 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 While in MICU patient noted with worsening renal failure requiring HD initiation, CGE/ Melena s/p EGD 8/31 found with esophagitis and erosive gastropathy, new right cerebellar infarct and fevers second to ESBL COLI and MSSA VAP, MSSA bacteremia, left ear otitis externa and drug rxn to cephalosporins Course further complicated by prolonged vent time s/p tracheostomy 9/1 and transferred to RCU 9/3 completed by recurrent fevers with high PIP, respiratory acidosis and concern for volume overloaded.     NEUROLOGY  # Metabolic Encephalopath vs NEW cerebella infarct   - Baseline MS AOX3 with short term memory changes per family however noted with concern for poor mentation in ICU  - MRI (8/17) with NEW right cerebellar infarct and old left PCA/occipital infarcts  - STEPHANIE (8/17) with AV showing two linear mobile echogenic elements attached to valve leaflets consistent with Lambel's excrescence, but no TRINITY thrombus, and lambel's excrescence with uncertain clinical implication.   - EEG (8/11-8/15) with no seizures   - ILR interrogation (9/7) with SR and PACs falsely recorded as AF.   - Attempted to resume ASA for medical management but held given GIB   - Continue Lipitor   - Course complicated by questionable head and body shaking 9/8 prolactin elevated, however was shaking head yes/no to some questions.   - rCTH 9/26 for worsening encephalopathy-  Vague questionable areas of hypoattenuation within the bilateral cerebellar hemispheres which may be compatible with acute/subacute ischemia. Recommend further evaluation with a brain MRI study, provided there are no MRI contraindications. No acute intracranial hemorrhage. Previously seen questionable vague wedge-shaped area of hypoattenuation in the left parietal lobe with artifactual secondary to motion and volume averaging with a prominent sulcus. Chronic left occipital lobe infarct.  - neuro consulted- reccs: New CTH from 9/26 without any evidence of acute infarct -> can consider repeat CTH down the line in mental status does not improve with treatment of underlying metabolic derangements and infections.    CARDIOVASCULAR  # HTN Urgency   # Septic vs vasoplegic shock   - weaned off levophed overnight. may require restarting during HD session today.   - Holding Labetalol , Catapres , Cardura   - started stress does steroids (9/27 - )    # Hx of HFpEF with likely ADHF with volume overload.   - ECHO (7/2023) with EF 59 with severe LVH and diastolic dysfunction   - STEPHANIE (8/18) with normal LVRVSF however noted with increased LA pressures and persistent LA septal bowing into RA.   - ECHO (8/11) with EF 60 with mild LVH, normal LVRVSF, and mild ddfxn.  - Remove volume with HD sessions and intermittent bumex pushes as BP allows    - s/p Bumex 2mg IV BID/bumex gtt/bumex 2mg q6hrs w/ minimal improvement in UOP. -  plan for second HD session today    HEENT   # Left ear OE   - ENT evaluation (9/7) with no mastoid tenderness, however found with positive swelling and excoriation to left auricle and tenderness to manipulation of auricle. Debrided crusting of auricle and EAC evaluated with edema and unable to visualize TM. EAC debrided and found with watery exudate.   - s/p CiproHC (9/5 - 9/16)   - Ana Cristina discontinued. Rash on torso concerning for possible drug rash  - ENT following and ear wick change QD   - Wick out but needs ? Debridement wound care called/fu ent   - ENT recommending CT IAC w/ IV con but family is refusing as concerned given CKD, kidneys wouldn't recover from IV contrast. Spoke with Nephrology, ENT, and patient's  at length. Planned for CT non con and per , decision will be made from there but for now doesn't want to risk further harming kidneys.  - CT IAC showing acute on chronic left-sided otitis externa and acute on chronic left-sided otomastoiditis. Superimposed left-sided tympanosclerosis. Superimposed cholesteatoma formation within the left tympanomastoid cavity cannot be excluded. No evidence of malignant otitis externa.  - ENT consulted (9/20) for white purulent discharge from left ear, recc: ENT: acetic acid drops BID to left ear. Mupirocin ointment to the left pinna BID, cover with xeroform after placing mupirocin ointment. Pressure offloading of the left ear.  + L eye redness in setting of surrounding infectious process - Ophthalmology consulted via team, recs pending     # Oral Lesions with questionable Zoster vs Trauma?   - Patient with tongue pain and seen with anterior ulcerations consolidating and crusting and posterior ulceration.  - Case discussed with pathology resident and culture/ cytology sent.   - HSV negative and Path (9/6) with normal appearing epithelial cells singly and in clusters devoid of viral cytopathic effect.   - Continue on magic mouth wash for pain    RESPIRATORY  # Acute hypoxic/hypercapnic Respiratory failure likely iso volume overload   - Hx of CKD and HFpEF presented with SOB and hypercarbia second to volume overload requiring intubation and HD initiation   - Vent weaning attempted however limited requiring tracheostomy (9/1) with IP   - Course complicated by PIPs elevated (50s) and respiratory acidosis second to secretions vs volume ?    - Vent settings: 23/340/8/60%  - ABG - ( 28 Sep 2023 02:15 ) pH, Arterial: 7.27  pH, Blood: x     /  pCO2: 55    /  pO2: 53    / HCO3: 25    / Base Excess: -1.9  /  SaO2: 88.7    - POCUS (9/8) with BL pleural effusions (large on right and small on left)   - CT CHEST (9/8) with TBM, BL pleural effusions and continued consolidations   - (9/27) Over the past 24 hrs, pt with increasing O2 requirements and respiratory acidosis with poor TVs. Bedside bronchoscopy (+) severe dynamic airway collapse into distal end of tracheostomy extending to main ines and RM bronchus.  tracheostomy exchanged at bedside to size 6dXLT with repeat bronchoscopy showing some but not complete improvement in dynamic collapse.   - Continue volume removal , plan for repeat HD session today  - Continue on Duoneb for airway clearance        GI  # UGIB   - CGE x 1 episode on 8/24 and melena x 2 episodes on 8/31 likely residual blood.   - EGD (8/31) found with esophagitis and erosive gastropathy  - Continue on PPI BID through late October and then PPI QD   - No melena     # Dysphagia   - NGT-TF continued , changed to R nostril today given infections on L side  - Pending PEG however needs improvement in infectious status prior to placement.     RENAL  # Progressive CKD   - Presented volume overload and progressive RUSS on CKD (baseline CRE in 2s and mode into the 3s on admission) likely obstruction vs low flow state with shock vs AIN from drug reaction issue?  - POCUS with no signs of hydronephrosis   - Pressor support started with improvement in renal function  - Attempted steroid course in ICU without improvement in renal function   - HD inititated in ICU for volume overload, held 9/20 iso malfunctioning shiley and stable labs/vol status   - iso worsening hypoxia/respiratory acidosis, L IJ shiley placed 9/27 and HD restarted. s/p 1.3L fluid removal 9/27. plan for repeat HD session today.   - renal following  - Monitor renal function and UOP, and electrolytes   - Monitor urine output - willard placed on icu admission, consider D/c tomorrow.       Hyponatremia   - c/w Salt tabs - s/p Hypertonic 1.5%NS @ 50cc/hr x 5 hr    # Hyperphosphatemia (resolved)   - PTH normal and elevated phos likely from renal failure  - s/p Phos binder with Renvela - now d'beth as level wnl      INFECTIOUS DISEASE  # ESBL ECOLI and MSSA VAP c/b MSSA bacteremia   - Course complicated by recurrent fevers   - RVP/ COVID (8/11) negative   - SCx (8/11) with MSSA s/p cefepime (8/12-8/13) and then ancef (8/14) however noted with drug reaction and then changed to vanco and aztreonam (8/12-8/13) in ICU .   - SCx (9/1) with MSSA and ESBL ECOLI   - BAL (9/1) with MSSA   - BCx (9/1) with MSSA and cleared on (9/4)   - ECHO (9/6) unable to rule out endocarditis   - LE DOPPLERS- Blood and sputum cx NTD; Acute left deep vein thrombosis of the left popliteal vein.  - s/p Ana Cristina (9/4 - 9/22 ) and Vanco by level (last dose 9/22 ) , pt noted w/ new erythematous patchy rash, did not improve after discontinuing the ana cristina, so vanco switched to changed to Daptomycin per ID recs (9/26-)  - s/p fluconazole 200 qd (9/219/28) Dc'd per ID recs given family states rash appeared post fluconazole initiation  - BCx 9/26- NTD, Urine Cx 9/26- NTD, MRSA pcr+ staph    # Left ear OE   - ENT evaluation (9/7) with no mastoid tenderness, however found with positive swelling and excoriation to left auricle and tenderness to manipulation of auricle. Debrided crusting of auricle and EAC evaluated with edema and unable to visualize TM. EAC debrided and found with watery exudate.   - s/p CiproHC (9/5 - 9/16)     # MRSA PCRs   - MRSA PCR (8/11 and 8/14) negative   - Next MRSA + PCR ordered for 10/8     HEME  # Anemia second to GIB vs renal disease   - Hold chemical DVT PPX given bleeding/ waxing and waning HH   - s/p multiple units of PRBCs (8/15, 8/21, 8/28, 8/31 and 9/8)   - Anemia panel with AOCD but with low %sat and ferrous sulfate added to optimize   - Monitor HH and discuss with renal for EPO sometime next week.   - Spoke with GI for clearance to start Heparin gtt for + DVT   - c/w Heparin gtt (9/21-)    Vascular  # DVT  - Pt with + popliteal DVT on SCD Spoke with GI no absolute contraindication for starting Heparin - advise close monitoring for bleeding - Do stat CTA if bleeding occurs and call IR   - c/w Heparin gtt (9/21-)    ONC   # Hx of Breast CA   - Patient dx in 2018 and s/p BL mastectomy (radical on right) and chemo and RT.   - Supportive care.     ENDOCRINE  # IDDM2   - NPH increased to 5u today, continue ISS   - Monitor FS and adjust as needed   - started stress dose steroids 9/27, monitor FS     LINES/ TUBES  - R IJ SHILEY (8/19 - 9/21)   - L IJ shiley (9/27 - )    SKIN  # Drug Eruption   - Attempted cefepime (8/12) vs ancef (8/13-8/14) and then noted with drug rxn.   - Hold further cephalosporins at this time   - s/p Steroids with prednisone 40 (8/18 - 9/2) then prednisone 30 (9/3-9/7), then prednisone 20 (9/8 - 9/10), then prednisone 10mg (9/11-9/13)        ETHICS/ [facility]    - FULL CODE

## 2023-09-28 NOTE — PROGRESS NOTE ADULT - SUBJECTIVE AND OBJECTIVE BOX
Remains sedated on Precedex;  on heparin gtt; off pressor gtts and on enteral Midodrine  S/p HD yesterday; 1.3L net UF achieved    VITAL:  T(C): , Max: 37.7 (09-28-23 @ 12:00)  T(F): , Max: 99.8 (09-28-23 @ 12:00)  HR: 90 (09-28-23 @ 15:35)  BP: 110/91 (09-28-23 @ 14:35)  BP(mean): 98 (09-28-23 @ 14:35)  RR: 22 (09-28-23 @ 14:00)  SpO2: 100% (09-28-23 @ 15:35)  Urine output 370cc/24h (15cc/h)    PHYSICAL EXAM:  Constitutional: sedated  HEENT: trach-vent, (+)NGT  Respiratory: coarse BS b/l  Cardiovascular: reg s1s2  Gastrointestinal: BS+, soft, NT/ND  Extremities: + peripheral edema  Neurological: reduced generalized strength   : (+) Wheeler  Skin: No rashes  Access: (+)[facility]    LABS:                        8.2    11.41 )-----------( 343      ( 28 Sep 2023 02:15 )             26.1     Na(131)/K(4.2)/Cl(93)/HCO3(23)/BUN(82)/Cr(2.36)Glu(255)/Ca(9.4)/Mg(2.30)/PO4(3.6)    09-28 @ 02:15  Na(132)/K(4.1)/Cl(95)/HCO3(25)/BUN(78)/Cr(2.25)Glu(228)/Ca(9.1)/Mg(2.40)/PO4(3.8)    09-27 @ 23:30  Na(129)/K(4.3)/Cl(91)/HCO3(22)/BUN(107)/Cr(2.99)Glu(188)/Ca(9.6)/Mg(2.60)/PO4(4.7)    09-27 @ 02:50  Na(125)/K(4.5)/Cl(91)/HCO3(25)/BUN(110)/Cr(2.97)Glu(168)/Ca(9.3)/Mg(2.50)/PO4(4.4)    09-26 @ 16:05  Na(128)/K(4.5)/Cl(91)/HCO3(25)/BUN(109)/Cr(2.87)Glu(213)/Ca(9.4)/Mg(2.40)/PO4(4.0)    09-26 @ 06:15  Na(128)/K(4.4)/Cl(93)/HCO3(26)/BUN(102)/Cr(2.88)Glu(200)/Ca(9.3)/Mg(2.40)/PO4(3.9)    09-25 @ 21:30      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    (2)RUSS - new RUSS - hemodynamic - s/p HD yesterday; planned for HD again today    (3)Pulm- vent-dependent    (4)ID - now on Dapto IV - dosed for low GFR    (5)Anemia - receiving DAVID and PRBCs intermittently    (6)CV - tenuous hemodynamics - off pressor gtts/on Midodrine    RECOMMEND:  (1)HD again today x 2.5h, 1.5kg UF as able, pressors as needed to keep SBP>90  (2)Dose new meds for GFR 10-15ml/min          Jimmy Colon MD  [facility]  Office/on call physician: [phone]  Cell (7a-7p): [phone]

## 2023-09-28 NOTE — ADVANCED PRACTICE NURSE CONSULT - RECOMMEDATIONS
Continue orders as per ENT for left ear.     Topical recommendations:   ---Bilateral neck, breast folds, abdominal pannus: Cleanse with soap and water, pat dry. Apply Interdry textile sheeting, under intertriginous folds leaving 2 inches exposed at ends to wick, remove to wash & dry affected area, then replace. Individual sheeting may be used for up to 5 days unless soiled. Inspect skin daily.  ---Sacral/gluteal fold: continue with TRIAD paste twice a day and PRN with incontinent episodes. With episodes of incontinence only remove soiled layer of Triad, then reinforce with thin layer.   ---Midline back: Cover blisters with silicone foam with border for protection. Change every 2 days and PRN if soiled.   ---Continue low air loss bed therapy, heel elevation with offloading boots, turn & reposition q2h with Z-flow fluidized pillow, continue moisture management with barrier creams as specified above & single breathable pad, continue measures to decrease friction/shear.     Plan discussed with patient's family member and primary RN at bedside.   Please contact Wound/Ostomy Care Service Line if we can be of further assistance (ext [phone]). Please reconsult if changes to tissue type noted.  Continue orders as per ENT for left ear.     Topical recommendations:   ---Bilateral neck, breast folds, abdominal pannus: Cleanse with soap and water, pat dry. Apply Interdry textile sheeting, under intertriginous folds leaving 2 inches exposed at ends to wick, remove to wash & dry affected area, then replace. Individual sheeting may be used for up to 5 days unless soiled. Inspect skin daily.  ---Sacral/gluteal fold: continue with TRIAD paste twice a day and PRN with incontinent episodes. With episodes of incontinence only remove soiled layer of Triad, then reinforce with thin layer.   ---Midline back: Cover blisters with silicone foam with border for protection. Change every 2 days and PRN if soiled.   ---Continue low air loss bed therapy, heel elevation with offloading boots, turn & reposition q2h with Z-flow fluidized pillow, continue moisture management with barrier creams as specified above & single breathable pad, continue measures to decrease friction/shear.     Plan discussed with patient's family member and primary RN Bernie Goodman at bedside.   Please contact Wound/Ostomy Care Service Line if we can be of further assistance (ext [phone]). Please reconsult if changes to tissue type noted.

## 2023-09-28 NOTE — PROGRESS NOTE ADULT - ASSESSMENT
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 family declined renal biopsy started on prednisone  s/p trach 9/1 and BAL with MSSA   ET cx with ESBL and MSSA  started on ana cristina 9/1  blood cx 9/1: MSSA   repeat negative 9/4, 9/8  CXR with b/l opacities, R increased  L ear edema and otitis externa, the last cx showed candida and ENT stated it could be fungal (saw black spores?)    initial  resp failure for ?fluid ovrer load but also had MSSA in the sputum, got vanco, aztreonam one day then cefepime and had rash, renal failure which was considered due to cefepime and then received 2 days of cefazolin and then off, now with trach and bronc on 9/1 with MSSA bacteremia and BAL also MSSA  another ET cx with MSSA and ESBL E-coli  hypotension, MSSA bacteremia likely due to pneumonia, repeat blood cx negative 9/4, TTE limited unable to exclude endocarditis  L otitis externa on ana cristina since 9/1 but worsening edema and black discoloration with bullae forming and persistent fever (ear cx was negative)  Ct showed acute on chronic otitis externa and otomastoiditis , superimposed cholesteatoma can not be excluded, no malignant otitis externa  pt again febrile, ENT stated there was black spores which could be a fungal infection and the last cx showed candida albicans  new erythematous patchy rash, did not improved after discontinuing the ana cristina so vanco switched to dapto but still with rash and it also started after pt was started on fluconazole so not sure which caused it  also hypotensive now and ?uveitis vs endophthalmitis, head CT with acute/subacute ischemia and old occipital infarct, chest/abd CT with unchanged  RUL consolidation, decreased BEVERLY consolidation  pt uremic as well, s/p shiley and resumed on HD 9/27  s/p bronc with excessive dynamic airway collapse s/p trach change and some improvement  *  blood cs negative and urine cx with 10-49 gram positive  * discontinue flucoanzole as pt already had a 7 day course and it could be the cause of rash  * c/w dapto 500 q 48 (if plan is for HD, should be given after HD) the 4 week bacteremia course will end 10/2  * monitor CK  * monitor CBC/diff and renal function  * f/u with ophthalmology    The above assessment and plan was discussed with the primary team    Yumiko Conner MD  contact on teams  After 5pm and on weekends call [phone]

## 2023-09-28 NOTE — CHART NOTE - NSCHARTNOTEFT_GEN_A_CORE
Pt seen for malnutrition follow up.     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 Started on HD for worsening renal failure. CGE/melena s/p EGD (8/31) found with esophagitis and erosive gastropathy. New right cerebellar infarct and fevers second to ESBL COLI and MSSA VAP, MSSA bacteremia, left ear otitis externa and drug rxn to cephalosporins. Course further complicated by prolonged vent time s/p tracheostomy (9/1). S/p multiple units of PRBCs. Hypotensive, transferred to MICU. NGT replaced (9/27). Pending PEG however needs improvement in infectious status prior to placement.     Nutrition Course:  - Pt intubated, ordered for Precedex and Levophed. Undergoing HD at time of visit.  and RN present at bedside. Nepro visibly infusing via pump at goal rate 35 mL/hr.  - Current nutrition prescription provides a total of 840 mL formula, 1572 kcal (1512 kcal from formula), 83.04 gm protein (68.04 gm protein from formula), 611 mL free water. This meets 29.9 kcal/kg, 1.58 gm protein/kg @ IBW+10% 52.5 kg.   - Loose stool noted, last BM 9/28 per flowsheets. Continues on 2 Banatrol packets and 1 packet psyllium daily.   - Pt continues on steroids for drug reaction, fingersticks elevated. Labs notable for hyponatremia, continues on salt tabs.     Diet Prescription:   - NPO with Tube Feed: Nepro with Carb Steady via nasogastric tube at 35 mL/hr x24 hrs + No Carb Prosource 1 package daily + Banatrol TF 2 packages daily     Pertinent Medications:   - dexMEDEtomidine Infusion 0.3 MICROgram(s)/kG/Hr (4.99 mL/Hr) IV Continuous, norepinephrine Infusion 0.06 MICROgram(s)/kG/Min (7.48 mL/Hr) IV Continuous   - epoetin odalis IV, ferrous sulfate Liquid, hydrocortisone sodium succinate IV, DAPTOmycin IV, atorvastatin, buMETAnide IV, midodrine, pantoprazole IV, psyllium Powder, sodium chloride 2 gm BID  - insulin NPH human recombinant 5U q6 hrs, ADMELOG corrective regimen sliding scale    Pertinent Labs:   - (9/28) Na 131 mmol/L<L> Glu 255 mg/dL<H> K+ 4.2 mmol/L Cr  2.36 mg/dL<H> BUN 82 mg/dL<H> Phos 3.6 mg/dL Alb 2.5 g/dL<L>    Food Allergy:  - NKFA    Weight: (9/28) 149.9 lbs / 68 kg, (9/24) 178.1 lbs / 80.8 kg, (9/21) 147.9 lbs / 67.1 kg, (9/15) 164.9 lbs / 74.8 kg, (9/11 dosing) 146.6 lbs / 66.5 kg  Weight Assessment: Weight appears stable since admission however increasing edema likely masking weight loss.   Height: 61 in / 154.9 cm  IBW: 105 lbs / 47.7 kg +/-10%  BMI: 27.7 kg/m^2 (at lowest weight)    Physical Assessment:  Edema, per flowsheets: 3+ generalized/bilateral arm  Pressure Injury, per Advanced Practice Nurse Consult (9/28): none noted    Estimated Needs:   [X] Recalculated, based on ideal body weight +10% (115 lbs / 52.5 kg)  7691-2077 kcal daily @30-35 kcal/kg, 78.75-94.5 gm protein daily @1.5-1.8 gm/kg     Previous Nutrition Diagnosis: [X] Severe malnutrition in the context of acute illness, remains appropriate  New Nutrition Diagnosis: [X] not applicable     Education:  [X] not applicable     Interventions:   1) Recommend increase Nepro via NGT to a goal rate of 40 mL/hr x24 hrs + No Carb Prosource 1 package daily to provide a total of 960 mL formula, 1788 kcal (1728 kcal from formula), 92.76 gm protein (77.76 gm protein from formula), 698 mL free water. This meets 34.1 kcal/kg, 1.77 gm protein/kg @ IBW+10% 52.5 kg.   2) May continue banatrol, psyllium given loose BMs.   3) Obtain daily weights.    Monitor & Evaluate:  Tolerance to EN, nutrition related lab values, weight trends, BMs/GI distress, hydration status, skin integrity.  Nehal Dee RDN, CDN #[id number]  Also available on [employer] Teams Pt seen for malnutrition follow up.     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 Started on HD for worsening renal failure. CGE/melena s/p EGD (8/31) found with esophagitis and erosive gastropathy. New right cerebellar infarct and fevers second to ESBL COLI and MSSA VAP, MSSA bacteremia, left ear otitis externa and drug rxn to cephalosporins. Course further complicated by prolonged vent time s/p tracheostomy (9/1). S/p multiple units of PRBCs. Hypotensive, transferred to MICU. NGT replaced (9/27). Pending PEG however needs improvement in infectious status prior to placement.     Nutrition Course:  - Pt intubated, ordered for Precedex and Levophed. Undergoing HD at time of visit.  and RN present at bedside. Nepro visibly infusing via pump at goal rate 35 mL/hr.  - Current nutrition prescription provides a total of 840 mL formula, 1572 kcal (1512 kcal from formula), 83.04 gm protein (68.04 gm protein from formula), 611 mL free water. This meets 29.9 kcal/kg, 1.58 gm protein/kg @ IBW+10% 52.5 kg.   - Loose stool noted, last BM 9/28 per flowsheets. Continues on 2 Banatrol packets and 1 packet psyllium daily.   - Pt continues on steroids for drug reaction, fingersticks elevated. Labs notable for hyponatremia, continues on salt tabs.     Diet Prescription:   - NPO with Tube Feed: Nepro with Carb Steady via nasogastric tube at 35 mL/hr x24 hrs + No Carb Prosource 1 package daily + Banatrol TF 2 packages daily     Pertinent Medications:   - dexMEDEtomidine Infusion 0.3 MICROgram(s)/kG/Hr (4.99 mL/Hr) IV Continuous, norepinephrine Infusion 0.06 MICROgram(s)/kG/Min (7.48 mL/Hr) IV Continuous   - epoetin odalis IV, ferrous sulfate Liquid, hydrocortisone sodium succinate IV, DAPTOmycin IV, atorvastatin, buMETAnide IV, midodrine, pantoprazole IV, psyllium Powder, sodium chloride 2 gm BID  - insulin NPH human recombinant 5U q6 hrs, ADMELOG corrective regimen sliding scale    Pertinent Labs:   - (9/28) Na 131 mmol/L<L> Glu 255 mg/dL<H> K+ 4.2 mmol/L Cr  2.36 mg/dL<H> BUN 82 mg/dL<H> Phos 3.6 mg/dL Alb 2.5 g/dL<L>    Food Allergy:  - NKFA    Weight: (9/28) 149.9 lbs / 68 kg, (9/24) 178.1 lbs / 80.8 kg, (9/21) 147.9 lbs / 67.1 kg, (9/15) 164.9 lbs / 74.8 kg, (9/11 dosing) 146.6 lbs / 66.5 kg  Weight Assessment: Weight appears stable since admission however increasing edema likely masking weight loss.   Height: 61 in / 154.9 cm  IBW: 105 lbs / 47.7 kg +/-10%  BMI: 27.7 kg/m^2 (at lowest weight)    Physical Assessment:  Edema, per flowsheets: 3+ generalized/bilateral arm  Pressure Injury, per Advanced Practice Nurse Consult (9/28): none noted    Estimated Needs:   [X] Recalculated, based on ideal body weight +10% (115 lbs / 52.5 kg)  1923-6311 kcal daily @30-35 kcal/kg, 78.75-94.5 gm protein daily @1.5-1.8 gm/kg     Previous Nutrition Diagnosis: [X] Severe malnutrition in the context of acute illness, remains appropriate  New Nutrition Diagnosis: [X] not applicable     Education:  [X] not applicable     Interventions:   1) Recommend increase Nepro via NGT to a goal rate of 40 mL/hr x24 hrs + No Carb Prosource 1 package daily to provide a total of 960 mL formula, 1788 kcal (1728 kcal from formula), 92.76 gm protein (77.76 gm protein from formula), 698 mL free water. This meets 34.1 kcal/kg, 1.77 gm protein/kg @ IBW+10% 52.5 kg.   2) May continue Banatrol, psyllium given loose BMs.   3) Obtain daily weights.  4) Adjustment of insulin regimen as appropriate to optimize glycemic management.     Monitor & Evaluate:  Tolerance to EN, nutrition related lab values, weight trends, BMs/GI distress, hydration status, skin integrity.  Nehal Dee RDN, CDN #[id number]  Also available on [employer] Teams

## 2023-09-28 NOTE — ADVANCED PRACTICE NURSE CONSULT - ASSESSMENT
Patient on mechanical ventilation via tracheostomy, right nare NGT, peritubular skin intact. Incontinent of loose stool, incontinence care provided with primary RN, willard catheter in place. Skin warm, dry with increased moisture in intertriginous folds, generalized anasarca, adequate skin turgor, hyperpigmentation to bilateral elbows.    L ear with stable scab, followed by ENT.    Bilateral neck, bilateral breast folds, bilateral abdominal pannus- moisture associated dermatitis as evidenced by erythema, hyperpigmentation  and increased moisture. No evidence of candidiasis.     Sacral/gluteal fold to bilateral buttocks-  severe moisture and incontinence associated dermatitis complicated by frictional forces as evidenced by multiple erosions exposing pink moist dermis with small serosanguinous drainage. Irregular borders. Over soft tissue. No evidence of candidiasis.     Midline upper back with scattered serous filled blisters 2/2 edema, largest intact blister [id number]. No drainage, no odor, Periwound with no erythema, no increased warmth, no edema, no induration, no fluctuance no signs or symptoms of overt skin infection. Goals of care: atraumatic dressing, protect from friction and shear, monitor for tissue type changes.     Patient continues to be critically ill- at high risk for skin breakdown. On assessment patient on a low air loss surface, heel offloading boots in place, Z-flow positioning pillow utilized, moisture management in place with use of one breathable incontinence pad. Turned and positioned per protocol.

## 2023-09-28 NOTE — PROGRESS NOTE ADULT - SUBJECTIVE AND OBJECTIVE BOX
Subjective: Patient seen and examined. No new events except as noted.   remains in MICU   s/p Bronch for [id number], showed trache well above main ines and again severe dynamic airway collapse. ABG proved slightly to 7.27/55/53. tolerated HD w/ 1.3L fluid removal, weaned off levophed following session.      REVIEW OF SYSTEMS:  Unable to obtain     MEDICATIONS:  MEDICATIONS  (STANDING):  acetic acid 0.25% Topical Irrigation 1 Application(s) Topical two times a day  albuterol/ipratropium for Nebulization 3 milliLiter(s) Nebulizer every 6 hours  artificial tears (preservative free) Ophthalmic Solution 1 Drop(s) Right EYE every 2 hours  atorvastatin 40 milliGRAM(s) Oral at bedtime  buMETAnide Injectable 2 milliGRAM(s) IV Push <User Schedule>  chlorhexidine 0.12% Liquid 15 milliLiter(s) Oral Mucosa every 12 hours  chlorhexidine 2% Cloths 1 Application(s) Topical daily  chlorhexidine 4% Liquid 1 Application(s) Topical <User Schedule>  DAPTOmycin IVPB 500 milliGRAM(s) IV Intermittent every 48 hours  dexMEDEtomidine Infusion 0.3 MICROgram(s)/kG/Hr (4.99 mL/Hr) IV Continuous <Continuous>  dextrose 5%. 1000 milliLiter(s) (50 mL/Hr) IV Continuous <Continuous>  dextrose 5%. 1000 milliLiter(s) (100 mL/Hr) IV Continuous <Continuous>  dextrose 50% Injectable 12.5 Gram(s) IV Push once  dextrose 50% Injectable 25 Gram(s) IV Push once  dextrose 50% Injectable 25 Gram(s) IV Push once  epoetin odalis (EPOGEN) Injectable 28433 Unit(s) SubCutaneous <User Schedule>  erythromycin   Ointment 1 Application(s) Left EYE four times a day  ferrous    sulfate Liquid 300 milliGRAM(s) Oral daily  glucagon  Injectable 1 milliGRAM(s) IntraMuscular once  heparin  Infusion 1050 Unit(s)/Hr (11.5 mL/Hr) IV Continuous <Continuous>  hydrocortisone sodium succinate Injectable 50 milliGRAM(s) IV Push every 8 hours  insulin lispro (ADMELOG) corrective regimen sliding scale   SubCutaneous every 6 hours  insulin NPH human recombinant 3 Unit(s) SubCutaneous every 6 hours  midodrine. 20 milliGRAM(s) Oral three times a day  mupirocin 2% Ointment 1 Application(s) Topical two times a day  norepinephrine Infusion 0.06 MICROgram(s)/kG/Min (7.48 mL/Hr) IV Continuous <Continuous>  ofloxacin 0.3% Solution 1 Drop(s) Left EYE every 2 hours  pantoprazole  Injectable 40 milliGRAM(s) IV Push two times a day  petrolatum Ophthalmic Ointment 1 Application(s) Right EYE two times a day  psyllium Powder 1 Packet(s) Oral daily  sodium chloride 2 Gram(s) Oral two times a day      PHYSICAL EXAM:  T(C): 36.1 (23 @ 08:00), Max: 36.9 (23 @ 00:00)  HR: 106 (23 @ 11:04) (87 - 106)  BP: 100/36 (23 @ 10:00) (70/33 - 154/38)  RR: 22 (23 @ 10:00) (16 - 27)  SpO2: 100% (23 @ 11:04) (94% - 100%)  Wt(kg): --  I&O's Summary    27 Sep 2023 07:  -  28 Sep 2023 07:00  --------------------------------------------------------  IN: 6.1 mL / OUT: 2070 mL / NET: -33.9 mL    28 Sep 2023 07:  -  28 Sep 2023 11:13  --------------------------------------------------------  IN: 206 mL / OUT: 45 mL / NET: 161 mL            Appearance: NAD, +trach  HEENT: dry oral mucosa  Lymphatic: No lymphadenopathy  Cardiovascular: Normal S1 S2, No JVD, No murmurs, No edema  Respiratory: Decreased BS, + trach to vent	  Neuro: opens eyes  Gastrointestinal: Soft, Non-tender, + BS, + NGT  Skin: No rashes, No ecchymoses, No cyanosis	  Extremities: No strength/ROM 2/2 sedation, + BL LE edema  Vascular: Peripheral pulses palpable 2+ bilaterally  Mode: AC/ CMV (Assist Control/ Continuous Mandatory Ventilation), RR (machine): 12, TV (machine): 360, FiO2: 30, PEEP: 5, ITime: 0.74, MAP: 8, PIP: 29    LABS:    CARDIAC MARKERS:  CARDIAC MARKERS ( 27 Sep 2023 02:50 )  x     / x     / 46 U/L / x     / x                                    8.2    11.41 )-----------( 343      ( 28 Sep 2023 02:15 )             26.1         131<L>  |  93<L>  |  82<H>  ----------------------------<  255<H>  4.2   |  23  |  2.36<H>    Ca    9.4      28 Sep 2023 02:15  Phos  3.6       Mg     2.30         TPro  6.1  /  Alb  2.5<L>  /  TBili  0.3  /  DBili  x   /  AST  37<H>  /  ALT  14  /  AlkPhos  379<H>            TELEMETRY: SR	    ECG:  	  RADIOLOGY:   PROCEDURE:   · Procedure Name	Bronchoscopy  · TIME OUT	Patient's first and last name, , procedure, and correct site confirmed prior to the start of procedure.  · Procedure Date/Time	27-Sep-2023 16:21  · Pre-Bronchoscopy Tuberculosis Risk Assessment Screening(check 'Preview' tab for full text on these list values when selected)	I evaluated the patient prior to bronchoscopy procedure for active pulmonary/laryngeal M. tuberculosis disease and the risk and actions taken:    Low risk with routine standard of care measures followed.  · Informed Consent	This was an emergent procedure.  · Procedure Performed By	Elissa Freitas  · Procedure Assised By	Edith Lee  · Indication	Poor ventilation with concern for airway obstruction  · Procedure Details	  Bronchoscope inserted through tracheostomy. Tracheostomy noted to be well above the main ines. Pt noted to have excessive dynamic airway collapse with ~90% obstruction on exhalation of posterior membrane extending from distal trachea to RMB. Unable to fully inspect airways given concern for hypoxia. Shiley #6 cuffed exchanged for distal XLT with improvement in TV return. Bronchoscope then re-inserted through new tracheostomy, pt still with excessive dynamic airway collapse of posterior membrane, but improved ~70%.      PROCEDURE SELECTOR:    Procedure Selector:  PROCEDURES:  Bronchoscopy, adult 27-Sep-2023 16:26:53  Edith Lee  Replacement, tracheostomy tube 27-Sep-2023 16:27:37  Edith Lee.    PROCEDURE SUPERVISORY NOTE:    Supervisory Statement:  I was present and supervised the entire procedure..      Electronic Signatures:  Edith Lee)  (Signed 27-Sep-2023 16:27)  	Authored: PROCEDURE, PROCEDURE SELECTOR, PROCEDURE SUPERVISORY NOTE      DIAGNOSTIC TESTING:  [ ] Echocardiogram:  [ ]  Catheterization:  [ ] Stress Test:    OTHER:

## 2023-09-28 NOTE — PROGRESS NOTE ADULT - SUBJECTIVE AND OBJECTIVE BOX
S: Weaned off pressors      Medications: MEDICATIONS  (STANDING):  acetic acid 0.25% Topical Irrigation 1 Application(s) Topical two times a day  albuterol/ipratropium for Nebulization 3 milliLiter(s) Nebulizer every 6 hours  artificial tears (preservative free) Ophthalmic Solution 1 Drop(s) Right EYE every 2 hours  atorvastatin 40 milliGRAM(s) Oral at bedtime  buMETAnide Injectable 2 milliGRAM(s) IV Push <User Schedule>  chlorhexidine 0.12% Liquid 15 milliLiter(s) Oral Mucosa every 12 hours  chlorhexidine 2% Cloths 1 Application(s) Topical daily  chlorhexidine 4% Liquid 1 Application(s) Topical <User Schedule>  DAPTOmycin IVPB 500 milliGRAM(s) IV Intermittent every 48 hours  dexMEDEtomidine Infusion 0.3 MICROgram(s)/kG/Hr (4.99 mL/Hr) IV Continuous <Continuous>  dextrose 5%. 1000 milliLiter(s) (50 mL/Hr) IV Continuous <Continuous>  dextrose 5%. 1000 milliLiter(s) (100 mL/Hr) IV Continuous <Continuous>  dextrose 50% Injectable 12.5 Gram(s) IV Push once  dextrose 50% Injectable 25 Gram(s) IV Push once  dextrose 50% Injectable 25 Gram(s) IV Push once  epoetin odalis (EPOGEN) Injectable 90128 Unit(s) SubCutaneous <User Schedule>  erythromycin   Ointment 1 Application(s) Left EYE four times a day  ferrous    sulfate Liquid 300 milliGRAM(s) Oral daily  glucagon  Injectable 1 milliGRAM(s) IntraMuscular once  heparin  Infusion 1050 Unit(s)/Hr (9.5 mL/Hr) IV Continuous <Continuous>  hydrocortisone sodium succinate Injectable 50 milliGRAM(s) IV Push every 8 hours  insulin lispro (ADMELOG) corrective regimen sliding scale   SubCutaneous every 6 hours  insulin NPH human recombinant 5 Unit(s) SubCutaneous every 6 hours  midodrine. 20 milliGRAM(s) Oral three times a day  mupirocin 2% Ointment 1 Application(s) Topical two times a day  norepinephrine Infusion 0.06 MICROgram(s)/kG/Min (7.48 mL/Hr) IV Continuous <Continuous>  ofloxacin 0.3% Solution 1 Drop(s) Left EYE every 2 hours  pantoprazole  Injectable 40 milliGRAM(s) IV Push two times a day  petrolatum Ophthalmic Ointment 1 Application(s) Right EYE two times a day  psyllium Powder 1 Packet(s) Oral daily  sodium chloride 2 Gram(s) Oral two times a day    MEDICATIONS  (PRN):  acetaminophen   Oral Liquid .. 650 milliGRAM(s) Oral every 6 hours PRN Temp greater or equal to 38C (100.4F), Mild Pain (1 - 3)  artificial tears (preservative free) Ophthalmic Solution 1 Drop(s) Both EYES three times a day PRN Dry Eyes  calamine/zinc oxide Lotion 1 Application(s) Topical two times a day PRN Rash and/or Itching  dextrose Oral Gel 15 Gram(s) Oral once PRN Blood Glucose LESS THAN 70 milliGRAM(s)/deciliter  sodium chloride 0.9% lock flush 10 milliLiter(s) IV Push every 1 hour PRN Pre/post blood products, medications, blood draw, and to maintain line patency       Vitals:  Vital Signs Last 24 Hrs  T(C): 36.8 (28 Sep 2023 16:10), Max: 37.7 (28 Sep 2023 12:00)  T(F): 98.3 (28 Sep 2023 16:10), Max: 99.8 (28 Sep 2023 12:00)  HR: 105 (28 Sep 2023 18:00) (90 - 106)  BP: 102/48 (28 Sep 2023 18:00) (84/62 - 154/38)  BP(mean): 59 (28 Sep 2023 18:00) (44 - 106)  RR: 22 (28 Sep 2023 18:00) (20 - 27)  SpO2: 98% (28 Sep 2023 18:00) (98% - 100%)    Parameters below as of 28 Sep 2023 18:00  Patient On (Oxygen Delivery Method): ventilator    O2 Concentration (%): 50        Neurological Exam:  Mental Status: Eyes closed, sedated with precedex. Does not follow commands,  + trach to vent and NGT   Cranial Nerves: PERRL slightly sluggish, L subconjunctival hemorrhage  Motor: Moves upper extremities spontaneously. no movement noted in lowers  Sensation: WD to noxious x4  Reflexes: 1+ throughout at biceps, brachioradialis, triceps, patellars and ankles bilaterally and equal. No clonus.     I personally reviewed the below data/images/labs:         LABS:                          8.2    11.41 )-----------( 343      ( 28 Sep 2023 02:15 )             26.1     09-28    131<L>  |  93<L>  |  82<H>  ----------------------------<  255<H>  4.2   |  23  |  2.36<H>    Ca    9.4      28 Sep 2023 02:15  Phos  3.6     09-28  Mg     2.30     09-28    TPro  6.1  /  Alb  2.5<L>  /  TBili  0.3  /  DBili  x   /  AST  37<H>  /  ALT  14  /  AlkPhos  379<H>  09-28    LIVER FUNCTIONS - ( 28 Sep 2023 02:15 )  Alb: 2.5 g/dL / Pro: 6.1 g/dL / ALK PHOS: 379 U/L / ALT: 14 U/L / AST: 37 U/L / GGT: x           PTT - ( 28 Sep 2023 14:00 )  PTT:99.5 sec  Urinalysis Basic - ( 28 Sep 2023 02:15 )    Color: x / Appearance: x / SG: x / pH: x  Gluc: 255 mg/dL / Ketone: x  / Bili: x / Urobili: x   Blood: x / Protein: x / Nitrite: x   Leuk Esterase: x / RBC: x / WBC x   Sq Epi: x / Non Sq Epi: x / Bacteria: x            < from: CT Head No Cont (08.15.23 @ 17:20) >    ACC: [id number] EXAM:  CT BRAIN   ORDERED BY: MINAL SMA     PROCEDURE DATE:  08/15/2023          INTERPRETATION:  Clinical indication: Change in neuro exam.    Multiple axial sections were performed from base to vertex without   contrast enhancement. Coronal and sagittal reconstructions were   reformatted well.    This exam is compared prior head CT performed on August 11, 2023    Parenchymal volume loss and chronic microvessel ischemic changes are   again seen.    Abnormal low-attenuation involving the left occipital cortical   subcortical region is again seen. 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    No evidence of acute hemorrhage mass or mass effect is seen.    Evaluation of the osseous structures with appropriate window demonstrates   sclerotic changes about the left mastoid region which appears stable.   Opacification left middle ear region is again seen.    Patient is status post bilateral cataract surgery.    Impression: Stable exam.    < end of copied text >    vEEG:    Clinical Impression:  - Severe diffuse non-specific cerebral dysfunction  - There were no epileptiform abnormalities or seizures recorded.        CTH 8/11:    VENTRICLES AND SULCI: Age-appropriate involutional change  INTRA-AXIAL:  Old left PCA infarct as seen on the prior unchanged.   Microvascular ischemic changes involving the periventricular and   subcortical white matter as seen previously  EXTRA-AXIAL:  No mass or collection is seen.  VISUALIZED SINUSES:  Clear.  VISUALIZED MASTOIDS: Left mastoid sclerosis  CALVARIUM: Infiltrative appearance tothe calvarium may be indicative of   marrow infiltration on the basis of patient's known diagnosis of breast   cancer. MISCELLANEOUS:  None.    IMPRESSION:  No significant interval change compared with 7/17/2023 in   left PCA infarct which is old. Microvascular ischemic changes involving   the periventricular and subcortical white matter as seen   previously.Questionable lesions at the level of the calvarium related to   possible breast CA. Clinical correlation recommended.    --- End of Report ---      ct< from: CT Head No Cont (09.26.23 @ 21:02) >  IMPRESSION: Vague questionable areas of hypoattenuation within the   bilateral cerebellar hemispheres which may be compatible with   acute/subacute ischemia. Recommend further evaluation with a brain MRI   study, provided there are no MRI contraindications.    No acute intracranial hemorrhage.    Previously seen questionable vague wedge-shaped area of hypoattenuation   in the left parietal lobe with artifactual secondary to motion and volume   averaging with a prominent sulcus.    Chronic left occipital lobe infarct.    < end of copied text >

## 2023-09-29 NOTE — PROGRESS NOTE ADULT - SUBJECTIVE AND OBJECTIVE BOX
S: No overnight events. Pt seen      Medications: MEDICATIONS  (STANDING):  acetic acid 0.25% Topical Irrigation 1 Application(s) Topical two times a day  albuterol/ipratropium for Nebulization 3 milliLiter(s) Nebulizer every 6 hours  artificial tears (preservative free) Ophthalmic Solution 1 Drop(s) Right EYE every 2 hours  atorvastatin 40 milliGRAM(s) Oral at bedtime  buMETAnide Injectable 2 milliGRAM(s) IV Push <User Schedule>  chlorhexidine 0.12% Liquid 15 milliLiter(s) Oral Mucosa every 12 hours  chlorhexidine 2% Cloths 1 Application(s) Topical daily  chlorhexidine 4% Liquid 1 Application(s) Topical <User Schedule>  DAPTOmycin IVPB 500 milliGRAM(s) IV Intermittent every 48 hours  dextrose 5%. 1000 milliLiter(s) (100 mL/Hr) IV Continuous <Continuous>  dextrose 5%. 1000 milliLiter(s) (50 mL/Hr) IV Continuous <Continuous>  dextrose 50% Injectable 12.5 Gram(s) IV Push once  dextrose 50% Injectable 25 Gram(s) IV Push once  dextrose 50% Injectable 25 Gram(s) IV Push once  epoetin odalis (EPOGEN) Injectable 58996 Unit(s) SubCutaneous <User Schedule>  erythromycin   Ointment 1 Application(s) Left EYE four times a day  ferrous    sulfate Liquid 300 milliGRAM(s) Oral daily  glucagon  Injectable 1 milliGRAM(s) IntraMuscular once  heparin  Infusion 1050 Unit(s)/Hr (10 mL/Hr) IV Continuous <Continuous>  hydrocortisone sodium succinate Injectable 50 milliGRAM(s) IV Push every 8 hours  insulin lispro (ADMELOG) corrective regimen sliding scale   SubCutaneous every 6 hours  insulin NPH human recombinant 11 Unit(s) SubCutaneous every 6 hours  midodrine. 20 milliGRAM(s) Oral every 8 hours  mupirocin 2% Ointment 1 Application(s) Topical two times a day  ofloxacin 0.3% Solution 1 Drop(s) Left EYE every 2 hours  pantoprazole  Injectable 40 milliGRAM(s) IV Push two times a day  petrolatum Ophthalmic Ointment 1 Application(s) Right EYE two times a day  potassium chloride   Powder 20 milliEquivalent(s) Enteral Tube once  psyllium Powder 1 Packet(s) Oral daily  sodium chloride 2 Gram(s) Oral two times a day    MEDICATIONS  (PRN):  acetaminophen   Oral Liquid .. 650 milliGRAM(s) Oral every 6 hours PRN Temp greater or equal to 38C (100.4F), Mild Pain (1 - 3)  artificial tears (preservative free) Ophthalmic Solution 1 Drop(s) Both EYES three times a day PRN Dry Eyes  calamine/zinc oxide Lotion 1 Application(s) Topical two times a day PRN Rash and/or Itching  dextrose Oral Gel 15 Gram(s) Oral once PRN Blood Glucose LESS THAN 70 milliGRAM(s)/deciliter  sodium chloride 0.9% lock flush 10 milliLiter(s) IV Push every 1 hour PRN Pre/post blood products, medications, blood draw, and to maintain line patency       Vitals:  Vital Signs Last 24 Hrs  T(C): 37.2 (29 Sep 2023 08:00), Max: 38.4 (29 Sep 2023 04:00)  T(F): 99 (29 Sep 2023 08:00), Max: 101.1 (29 Sep 2023 04:00)  HR: 113 (29 Sep 2023 09:00) (78 - 118)  BP: 115/55 (29 Sep 2023 09:00) (84/62 - 124/30)  BP(mean): 65 (29 Sep 2023 09:00) (38 - 98)  RR: 22 (29 Sep 2023 09:00) (18 - 26)  SpO2: 98% (29 Sep 2023 09:00) (93% - 100%)    Parameters below as of 29 Sep 2023 09:00  Patient On (Oxygen Delivery Method): ventilator    O2 Concentration (%): 50      Neurological Exam:  Mental Status: Eyes closed, sedated with precedex. Does not follow commands,  + trach to vent and NGT   Cranial Nerves: PERRL slightly sluggish, L subconjunctival hemorrhage  Motor: Moves upper extremities spontaneously. no movement noted in lowers  Sensation: WD to noxious x4  Reflexes: 1+ throughout at biceps, brachioradialis, triceps, patellars and ankles bilaterally and equal. No clonus.     I personally reviewed the below data/images/labs:         LABS:                          7.4    8.30  )-----------( 303      ( 29 Sep 2023 03:21 )             23.0     09-29    129<L>  |  91<L>  |  55<H>  ----------------------------<  263<H>  3.6   |  25  |  1.78<H>    Ca    9.1      29 Sep 2023 03:21  Phos  2.8     09-29  Mg     2.20     09-29    TPro  5.8<L>  /  Alb  2.4<L>  /  TBili  0.3  /  DBili  x   /  AST  24  /  ALT  11  /  AlkPhos  336<H>  09-29    LIVER FUNCTIONS - ( 29 Sep 2023 03:21 )  Alb: 2.4 g/dL / Pro: 5.8 g/dL / ALK PHOS: 336 U/L / ALT: 11 U/L / AST: 24 U/L / GGT: x           PTT - ( 29 Sep 2023 03:21 )  PTT:64.8 sec  Urinalysis Basic - ( 29 Sep 2023 03:21 )    Color: x / Appearance: x / SG: x / pH: x  Gluc: 263 mg/dL / Ketone: x  / Bili: x / Urobili: x   Blood: x / Protein: x / Nitrite: x   Leuk Esterase: x / RBC: x / WBC x   Sq Epi: x / Non Sq Epi: x / Bacteria: x          < from: CT Head No Cont (08.15.23 @ 17:20) >    ACC: [id number] EXAM:  CT BRAIN   ORDERED BY: MINAL SAM     PROCEDURE DATE:  08/15/2023          INTERPRETATION:  Clinical indication: Change in neuro exam.    Multiple axial sections were performed from base to vertex without   contrast enhancement. Coronal and sagittal reconstructions were   reformatted well.    This exam is compared prior head CT performed on August 11, 2023    Parenchymal volume loss and chronic microvessel ischemic changes are   again seen.    Abnormal low-attenuation involving the left occipital cortical   subcortical region is again seen. 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    No evidence of acute hemorrhage mass or mass effect is seen.    Evaluation of the osseous structures with appropriate window demonstrates   sclerotic changes about the left mastoid region which appears stable.   Opacification left middle ear region is again seen.    Patient is status post bilateral cataract surgery.    Impression: Stable exam.    < end of copied text >    vEEG:    Clinical Impression:  - Severe diffuse non-specific cerebral dysfunction  - There were no epileptiform abnormalities or seizures recorded.        CTH 8/11:    VENTRICLES AND SULCI: Age-appropriate involutional change  INTRA-AXIAL:  Old left PCA infarct as seen on the prior unchanged.   Microvascular ischemic changes involving the periventricular and   subcortical white matter as seen previously  EXTRA-AXIAL:  No mass or collection is seen.  VISUALIZED SINUSES:  Clear.  VISUALIZED MASTOIDS: Left mastoid sclerosis  CALVARIUM: Infiltrative appearance tothe calvarium may be indicative of   marrow infiltration on the basis of patient's known diagnosis of breast   cancer. MISCELLANEOUS:  None.    IMPRESSION:  No significant interval change compared with 7/17/2023 in   left PCA infarct which is old. Microvascular ischemic changes involving   the periventricular and subcortical white matter as seen   previously.Questionable lesions at the level of the calvarium related to   possible breast CA. Clinical correlation recommended.    --- End of Report ---      ct< from: CT Head No Cont (09.26.23 @ 21:02) >  IMPRESSION: Vague questionable areas of hypoattenuation within the   bilateral cerebellar hemispheres which may be compatible with   acute/subacute ischemia. Recommend further evaluation with a brain MRI   study, provided there are no MRI contraindications.    No acute intracranial hemorrhage.    Previously seen questionable vague wedge-shaped area of hypoattenuation   in the left parietal lobe with artifactual secondary to motion and volume   averaging with a prominent sulcus.    Chronic left occipital lobe infarct.    < end of copied text >

## 2023-09-29 NOTE — PROGRESS NOTE ADULT - ASSESSMENT
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 While in MICU patient noted with worsening renal failure requiring HD initiation, CGE/ Melena s/p EGD 8/31 found with esophagitis and erosive gastropathy, new right cerebellar infarct and fevers second to ESBL COLI and MSSA VAP, MSSA bacteremia, left ear otitis externa and drug rxn to cephalosporins Course further complicated by prolonged vent time s/p tracheostomy 9/1 and transferred to RCU 9/3 completed by recurrent fevers with high PIP, respiratory acidosis and concern for volume overloaded.     NEUROLOGY  # Metabolic Encephalopath vs NEW cerebella infarct   - Baseline MS AOX3 with short term memory changes per family however noted with concern for poor mentation in ICU  - MRI (8/17) with NEW right cerebellar infarct and old left PCA/occipital infarcts  - STEPHANIE (8/17) with AV showing two linear mobile echogenic elements attached to valve leaflets consistent with Lambel's excrescence, but no TRINITY thrombus, and lambel's excrescence with uncertain clinical implication.   - EEG (8/11-8/15) with no seizures   - ILR interrogation (9/7) with SR and PACs falsely recorded as AF.   - Attempted to resume ASA for medical management but held given GIB   - Continue Lipitor   - Course complicated by questionable head and body shaking 9/8 prolactin elevated, however was shaking head yes/no to some questions.   - rCTH 9/26 for worsening encephalopathy-  Vague questionable areas of hypoattenuation within the bilateral cerebellar hemispheres which may be compatible with acute/subacute ischemia. Recommend further evaluation with a brain MRI study, provided there are no MRI contraindications. No acute intracranial hemorrhage. Previously seen questionable vague wedge-shaped area of hypoattenuation in the left parietal lobe with artifactual secondary to motion and volume averaging with a prominent sulcus. Chronic left occipital lobe infarct.  - neuro consulted- reccs: New CTH from 9/26 without any evidence of acute infarct -> can consider repeat CTH down the line in mental status does not improve with treatment of underlying metabolic derangements and infections.    CARDIOVASCULAR  # HTN Urgency   # Septic vs vasoplegic shock   - weaned off levophed overnight. may require restarting during HD session today.   - Holding Labetalol , Catapres , Cardura   - started stress does steroids (9/27 - )    # Hx of HFpEF with likely ADHF with volume overload.   - ECHO (7/2023) with EF 59 with severe LVH and diastolic dysfunction   - STEPHANIE (8/18) with normal LVRVSF however noted with increased LA pressures and persistent LA septal bowing into RA.   - ECHO (8/11) with EF 60 with mild LVH, normal LVRVSF, and mild ddfxn.  - Remove volume with HD sessions and intermittent bumex pushes as BP allows    - s/p Bumex 2mg IV BID/bumex gtt/bumex 2mg q6hrs w/ minimal improvement in UOP. -  plan for second HD session today    HEENT   # Left ear OE   - ENT evaluation (9/7) with no mastoid tenderness, however found with positive swelling and excoriation to left auricle and tenderness to manipulation of auricle. Debrided crusting of auricle and EAC evaluated with edema and unable to visualize TM. EAC debrided and found with watery exudate.   - s/p CiproHC (9/5 - 9/16)   - Ana Cristina discontinued. Rash on torso concerning for possible drug rash  - ENT following and ear wick change QD   - Wick out but needs ? Debridement wound care called/fu ent   - ENT recommending CT IAC w/ IV con but family is refusing as concerned given CKD, kidneys wouldn't recover from IV contrast. Spoke with Nephrology, ENT, and patient's  at length. Planned for CT non con and per , decision will be made from there but for now doesn't want to risk further harming kidneys.  - CT IAC showing acute on chronic left-sided otitis externa and acute on chronic left-sided otomastoiditis. Superimposed left-sided tympanosclerosis. Superimposed cholesteatoma formation within the left tympanomastoid cavity cannot be excluded. No evidence of malignant otitis externa.  - ENT consulted (9/20) for white purulent discharge from left ear, recc: ENT: acetic acid drops BID to left ear. Mupirocin ointment to the left pinna BID, cover with xeroform after placing mupirocin ointment. Pressure offloading of the left ear.  + L eye redness in setting of surrounding infectious process - Ophthalmology consulted via team, recs pending     # Oral Lesions with questionable Zoster vs Trauma?   - Patient with tongue pain and seen with anterior ulcerations consolidating and crusting and posterior ulceration.  - Case discussed with pathology resident and culture/ cytology sent.   - HSV negative and Path (9/6) with normal appearing epithelial cells singly and in clusters devoid of viral cytopathic effect.   - Continue on magic mouth wash for pain    RESPIRATORY  # Acute hypoxic/hypercapnic Respiratory failure likely iso volume overload   - Hx of CKD and HFpEF presented with SOB and hypercarbia second to volume overload requiring intubation and HD initiation   - Vent weaning attempted however limited requiring tracheostomy (9/1) with IP   - Course complicated by PIPs elevated (50s) and respiratory acidosis second to secretions vs volume ?    - Vent settings: 23/340/8/60%  - ABG - ( 28 Sep 2023 02:15 ) pH, Arterial: 7.27  pH, Blood: x     /  pCO2: 55    /  pO2: 53    / HCO3: 25    / Base Excess: -1.9  /  SaO2: 88.7    - POCUS (9/8) with BL pleural effusions (large on right and small on left)   - CT CHEST (9/8) with TBM, BL pleural effusions and continued consolidations   - (9/27) Over the past 24 hrs, pt with increasing O2 requirements and respiratory acidosis with poor TVs. Bedside bronchoscopy (+) severe dynamic airway collapse into distal end of tracheostomy extending to main ines and RM bronchus.  tracheostomy exchanged at bedside to size 6dXLT with repeat bronchoscopy showing some but not complete improvement in dynamic collapse.   - Continue volume removal , plan for repeat HD session today  - Continue on Duoneb for airway clearance        GI  # UGIB   - CGE x 1 episode on 8/24 and melena x 2 episodes on 8/31 likely residual blood.   - EGD (8/31) found with esophagitis and erosive gastropathy  - Continue on PPI BID through late October and then PPI QD   - No melena     # Dysphagia   - NGT-TF continued , changed to R nostril today given infections on L side  - Pending PEG however needs improvement in infectious status prior to placement.     RENAL  # Progressive CKD   - Presented volume overload and progressive RUSS on CKD (baseline CRE in 2s and mode into the 3s on admission) likely obstruction vs low flow state with shock vs AIN from drug reaction issue?  - POCUS with no signs of hydronephrosis   - Pressor support started with improvement in renal function  - Attempted steroid course in ICU without improvement in renal function   - HD inititated in ICU for volume overload, held 9/20 iso malfunctioning shiley and stable labs/vol status   - iso worsening hypoxia/respiratory acidosis, L IJ shiley placed 9/27 and HD restarted. s/p 1.3L fluid removal 9/27. plan for repeat HD session today.   - renal following  - Monitor renal function and UOP, and electrolytes   - Monitor urine output - willard placed on icu admission, consider D/c tomorrow.       Hyponatremia   - c/w Salt tabs - s/p Hypertonic 1.5%NS @ 50cc/hr x 5 hr    # Hyperphosphatemia (resolved)   - PTH normal and elevated phos likely from renal failure  - s/p Phos binder with Renvela - now d'beth as level wnl      INFECTIOUS DISEASE  # ESBL ECOLI and MSSA VAP c/b MSSA bacteremia   - Course complicated by recurrent fevers   - RVP/ COVID (8/11) negative   - SCx (8/11) with MSSA s/p cefepime (8/12-8/13) and then ancef (8/14) however noted with drug reaction and then changed to vanco and aztreonam (8/12-8/13) in ICU .   - SCx (9/1) with MSSA and ESBL ECOLI   - BAL (9/1) with MSSA   - BCx (9/1) with MSSA and cleared on (9/4)   - ECHO (9/6) unable to rule out endocarditis   - LE DOPPLERS- Blood and sputum cx NTD; Acute left deep vein thrombosis of the left popliteal vein.  - s/p Ana Cristina (9/4 - 9/22 ) and Vanco by level (last dose 9/22 ) , pt noted w/ new erythematous patchy rash, did not improve after discontinuing the ana cristina, so vanco switched to changed to Daptomycin per ID recs (9/26-)  - s/p fluconazole 200 qd (9/219/28) Dc'd per ID recs given family states rash appeared post fluconazole initiation  - BCx 9/26- NTD, Urine Cx 9/26- NTD, MRSA pcr+ staph    # Left ear OE   - ENT evaluation (9/7) with no mastoid tenderness, however found with positive swelling and excoriation to left auricle and tenderness to manipulation of auricle. Debrided crusting of auricle and EAC evaluated with edema and unable to visualize TM. EAC debrided and found with watery exudate.   - s/p CiproHC (9/5 - 9/16)     # MRSA PCRs   - MRSA PCR (8/11 and 8/14) negative   - Next MRSA + PCR ordered for 10/8     HEME  # Anemia second to GIB vs renal disease   - Hold chemical DVT PPX given bleeding/ waxing and waning HH   - s/p multiple units of PRBCs (8/15, 8/21, 8/28, 8/31 and 9/8)   - Anemia panel with AOCD but with low %sat and ferrous sulfate added to optimize   - Monitor HH and discuss with renal for EPO sometime next week.   - Spoke with GI for clearance to start Heparin gtt for + DVT   - c/w Heparin gtt (9/21-)    Vascular  # DVT  - Pt with + popliteal DVT on SCD Spoke with GI no absolute contraindication for starting Heparin - advise close monitoring for bleeding - Do stat CTA if bleeding occurs and call IR   - c/w Heparin gtt (9/21-)    ONC   # Hx of Breast CA   - Patient dx in 2018 and s/p BL mastectomy (radical on right) and chemo and RT.   - Supportive care.     ENDOCRINE  # IDDM2   - NPH increased to 5u today, continue ISS   - Monitor FS and adjust as needed   - started stress dose steroids 9/27, monitor FS     LINES/ TUBES  - R IJ SHILEY (8/19 - 9/21)   - L IJ shiley (9/27 - )    SKIN  # Drug Eruption   - Attempted cefepime (8/12) vs ancef (8/13-8/14) and then noted with drug rxn.   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 While in MICU patient noted with worsening renal failure requiring HD initiation, CGE/ Melena s/p EGD 8/31 found with esophagitis and erosive gastropathy, new right cerebellar infarct and fevers second to ESBL COLI and MSSA VAP, MSSA bacteremia, left ear otitis externa and drug rxn to cephalosporins Course further complicated by prolonged vent time s/p tracheostomy 9/1 and transferred to RCU 9/3 completed by recurrent fevers with high PIP, respiratory acidosis and concern for volume overloaded.     NEUROLOGY  # Metabolic Encephalopath vs NEW cerebella infarct   - Baseline MS AOX3 with short term memory changes per family however noted with concern for poor mentation in ICU  - MRI (8/17) with NEW right cerebellar infarct and old left PCA/occipital infarcts  - STEPHANIE (8/17) with AV showing two linear mobile echogenic elements attached to valve leaflets consistent with Lambel's excrescence, but no TRINITY thrombus, and lambel's excrescence with uncertain clinical implication.   - EEG (8/11-8/15) with no seizures   - ILR interrogation (9/7) with SR and PACs falsely recorded as AF.   - Attempted to resume ASA for medical management but held given GIB   - Continue Lipitor   - Course complicated by questionable head and body shaking 9/8 prolactin elevated, however was shaking head yes/no to some questions.   - rCTH 9/26 for worsening encephalopathy-  Vague questionable areas of hypoattenuation within the bilateral cerebellar hemispheres which may be compatible with acute/subacute ischemia. Recommend further evaluation with a brain MRI study, provided there are no MRI contraindications. No acute intracranial hemorrhage. Previously seen questionable vague wedge-shaped area of hypoattenuation in the left parietal lobe with artifactual secondary to motion and volume averaging with a prominent sulcus. Chronic left occipital lobe infarct.  - neuro consulted- reccs: New CTH from 9/26 without any evidence of acute infarct -> can consider repeat CTH down the line in mental status does not improve with treatment of underlying metabolic derangements and infections.    CARDIOVASCULAR  # HTN Urgency   # Septic vs vasoplegic shock   - weaned off levophed on 9/28 at 7:55 am, requiredd 0.01 Levophed yesterday with HD,   -- repeat HD tomorrow __ monitor for stability,    - Holding Labetalol, Catapres , Cardura   - started stress does steroids (9/27 - Solu_Cortef 50 TID, and today  to 25 mg TID   -- cont Bumex for diuresis and consider stopping if no improvement in UOP   -- - last PRBC transfusion on 9/19 -- started ASA today    # Hx of HFpEF with likely ADHF with volume overload.   - ECHO (7/2023) with EF 59 with severe LVH and diastolic dysfunction   - STEPHANIE (8/18) with normal LVRVSF however noted with increased LA pressures and persistent LA septal bowing into RA.   - ECHO (8/11) with EF 60 with mild LVH, normal LVRVSF, and mild ddfxn.  - Remove volume with HD sessions and intermittent bumex pushes as BP allows    - cont Bumex 2mg IV q 6 hrs/s/p bumex gtt/w/ minimal improvement in UOP. -  plan for HD session tomorrow and monitor for UOP    HEENT   # Left ear OE   - ENT evaluation (9/7) with no mastoid tenderness, however found with positive swelling and excoriation to left auricle and tenderness to manipulation of auricle. Debrided crusting of auricle and EAC evaluated with edema and unable to visualize TM. EAC debrided and found with watery exudate.   - s/p CiproHC (9/5 - 9/16)   - Ana Cristina discontinued. Rash on torso concerning for possible drug rash  - ENT following and ear wick change QD   - Wick out but needs ? Debridement wound care called/fu ent   - ENT recommending CT IAC w/ IV con but family is refusing as concerned given CKD, kidneys wouldn't recover from IV contrast. Spoke with Nephrology, ENT, and patient's  at length. Planned for CT non con and per , decision will be made from there but for now doesn't want to risk further harming kidneys.  - CT IAC showing acute on chronic left-sided otitis externa and acute on chronic left-sided otomastoiditis. Superimposed left-sided tympanosclerosis. Superimposed cholesteatoma formation within the left tympanomastoid cavity cannot be excluded. No evidence of malignant otitis externa.  - ENT consulted (9/20) for white purulent discharge from left ear, recc: ENT: acetic acid drops BID to left ear. Mupirocin ointment to the left pinna BID, cover with xeroform after placing mupirocin ointment. Pressure offloading of the left ear.  + L eye redness in setting of surrounding infectious process - Ophthalmology following, see in ID      # Oral Lesions with questionable Zoster vs Trauma?   - Patient with tongue pain and seen with anterior ulcerations consolidating and crusting and posterior ulceration.  - Case discussed with pathology resident and culture/ cytology sent.   - HSV negative and Path (9/6) with normal appearing epithelial cells singly and in clusters devoid of viral cytopathic effect.   - Continue on magic mouth wash for pain    RESPIRATORY  # Acute hypoxic/hypercapnic Respiratory failure likely iso volume overload   - Hx of CKD and HFpEF presented with SOB and hypercarbia second to volume overload requiring intubation and HD initiation   - Vent weaning attempted however limited requiring tracheostomy (9/1) with IP   - Course complicated by PIPs elevated (50s) and respiratory acidosis second to secretions vs volume ?    - Vent settings: 23/340/8/60%  - ABG - ( 28 Sep 2023 02:15 ) pH, Arterial: 7.27  pH, Blood: x     /  pCO2: 55    /  pO2: 53    / HCO3: 25    / Base Excess: -1.9  /  SaO2: 88.7    - POCUS (9/8) with BL pleural effusions (large on right and small on left)   - CT CHEST (9/8) with TBM, BL pleural effusions and continued consolidations   - (9/27) Over the past 24 hrs, pt with increasing O2 requirements and respiratory acidosis with poor TVs. Bedside bronchoscopy (+) severe dynamic airway collapse into distal end of tracheostomy extending to main ines and RM bronchus.  tracheostomy exchanged at bedside to size 6dXLT with repeat bronchoscopy showing some but not complete improvement in dynamic collapse.   - Continue volume removal , plan for repeat HD session tomorrow   - Continue on Duoneb for airway clearance        GI  # UGIB   - CGE x 1 episode on 8/24 and melena x 2 episodes on 8/31 likely residual blood.   - EGD (8/31) found with esophagitis and erosive gastropathy  - last PRBC transfusion on 9/19   - Continue on PPI BID through late October and then PPI QD   - No melena     # Dysphagia   - NGT-TF continued , changed to R nostril today given infections on L side  - Pending PEG however needs improvement in infectious status prior to placement.     RENAL  # Progressive CKD   - Presented volume overload and progressive RUSS on CKD (baseline CRE in 2s and mode into the 3s on admission) likely obstruction vs low flow state with shock vs AIN from drug reaction issue?  - POCUS with no signs of hydronephrosis   - s/p Pressor support started with improvement in renal function  - Attempted steroid course in ICU without improvement in renal function   - HD inititated in ICU for volume overload, held 9/20 iso malfunctioning shiley and stable labs/vol status   - iso worsening hypoxia/respiratory acidosis, L IJ shiley placed 9/27 and HD restarted. s/p 1.3L fluid removal 9/27.   -- HD on 9/27-- neg 1.3 L net and 9/28 with 1.8 L net neg, pt vided 195 over past 24 hrs, with 10-5 cc/ voidind per hr and UOP for LOS is + 2 L, and for 24 hrs is neg 686, plan for repeat HD session tomorrow.   - renal following  - Monitor renal function and UOP, and electrolytes   - Monitor urine output - willard placed on ICU admission, __ d/c willard today and perform TOV       Hyponatremia   - c/w Salt tabs - s/p Hypertonic 1.5%NS @ 50cc/hr x 5 hr  --as per nephrology, Hyponatremia - low but acceptable, on NaCl tabs + intermittent HD    # Hyperphosphatemia (resolved)   - PTH normal and elevated phos likely from renal failure  - s/p Phos binder with Renvela - now d'beth as level wnl      INFECTIOUS DISEASE  # ESBL ECOLI and MSSA VAP c/b MSSA bacteremia   - Course complicated by recurrent fevers, T max 101.1 today, last temp-- 100.5 on 9/20, WBC 8   - RVP/ COVID (8/11) negative   - SCx (8/11) with MSSA s/p cefepime (8/12-8/13) and then ancef (8/14) however noted with drug reaction and then changed to vanco and aztreonam (8/12-8/13) in ICU .   - SCx (9/1) with MSSA and ESBL ECOLI   - BAL (9/1) with MSSA   - BCx (9/1) with MSSA and cleared on (9/4), 9/26-- NGTD,   -- 9/26-- UA with WBC 2489, large leuks, U/Cx-- Candida,   + MSSA PCR on Mupirocin,   - ECHO (9/6) unable to rule out endocarditis   - LE DOPPLERS- Blood and sputum cx NTD; Acute left deep vein thrombosis of the left popliteal vein.  - s/p Ana Cristina (9/4 - 9/22 ) and Vanco by level (last dose 9/22 ) , pt noted w/ new erythematous patchy rash, did not improve after discontinuing the ana cristina, so vanco switched to changed to Daptomycin per ID recs (9/26-)  - s/p fluconazole 200 qd (9/219/28) Dc'd per ID recs given family states rash appeared post fluconazole initiation  - BCx 9/26- NTD, Urine Cx 9/26- NTD, MRSA pcr+ staph  -- recurrent F with 101.1 today __ repeat blood cxs sent, sputum cx sent  -- as per discussion with ID, added meropenem empirically (9/29 - ) and started on Valtrex (9/29- ) as per ophthalmology recs (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 Debrided crusting of auricle and EAC evaluated with edema and unable to visualize TM. EAC debrided and found with watery exudate.   - s/p CiproHC (9/5 - 9/16)     # MRSA PCRs   - MRSA PCR (8/11 and 8/14) negative. + MSSA PCR on Mupirocin,   - Next MRSA + PCR ordered for 10/8     HEME  # Anemia second to GIB vs renal disease   - Hold chemical DVT PPX given bleeding/ waxing and waning HH   - s/p multiple units of PRBCs (8/15, 8/21, 8/28, 8/31 and 9/8)   - Anemia panel with AOCD but with low %sat and ferrous sulfate added to optimize   - Monitor HH and discuss with renal for EPO sometime next week.   - Spoke with GI for clearance to start Heparin gtt for + DVT   - c/w Heparin gtt (9/21-), aPTT are therapeutic-- 59>64, pt's specific-- aPTT goal 60-85     Vascular  # DVT  - Pt with + popliteal DVT on SCD Spoke with GI no absolute contraindication for starting Heparin - advise close monitoring for bleeding - Do stat CTA if bleeding occurs and call IR   - c/w Heparin gtt (9/21-)    ONC   # Hx of Breast CA   - Patient dx in 2018 and s/p BL mastectomy (radical on right) and chemo and RT.   - Supportive care.     ENDOCRINE  # IDDM2   - Monitor FS and adjust as needed   - started stress dose steroids 9/27, tepering down to 25 mg TID today, monitor FS -- 271>306>272  - -increased NPH to 11 U q 6 hrs, continue ISS     LINES/ TUBES  - R IJ SHILEY (8/19 - 9/21)   - L IJ shiley (9/27 - )    SKIN  # Drug Eruption   - Attempted cefepime (8/12) vs ancef (8/13-8/14) and then noted with drug rxn.   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 While in MICU patient noted with worsening renal failure requiring HD initiation, CGE/ Melena s/p EGD 8/31 found with esophagitis and erosive gastropathy, new right cerebellar infarct and fevers second to ESBL COLI and MSSA VAP, MSSA bacteremia, left ear otitis externa and drug rxn to cephalosporins Course further complicated by prolonged vent time s/p tracheostomy 9/1 and transferred to RCU 9/3 completed by recurrent fevers with high PIP, respiratory acidosis and concern for volume overloaded.     NEUROLOGY  # Metabolic Encephalopath vs NEW cerebella infarct   - Baseline MS AOX3 with short term memory changes per family however noted with concern for poor mentation in ICU  - MRI (8/17) with NEW right cerebellar infarct and old left PCA/occipital infarcts  - STEPHANIE (8/17) with AV showing two linear mobile echogenic elements attached to valve leaflets consistent with Lambel's excrescence, but no TRINITY thrombus, and lambel's excrescence with uncertain clinical implication.   - EEG (8/11-8/15) with no seizures   - ILR interrogation (9/7) with SR and PACs falsely recorded as AF.   - Attempted to resume ASA for medical management but held given GIB   - Continue Lipitor   - Course complicated by questionable head and body shaking 9/8 prolactin elevated, however was shaking head yes/no to some questions.   - rCTH 9/26 for worsening encephalopathy-  Vague questionable areas of hypoattenuation within the bilateral cerebellar hemispheres which may be compatible with acute/subacute ischemia. Recommend further evaluation with a brain MRI study, provided there are no MRI contraindications. No acute intracranial hemorrhage. Previously seen questionable vague wedge-shaped area of hypoattenuation in the left parietal lobe with artifactual secondary to motion and volume averaging with a prominent sulcus. Chronic left occipital lobe infarct.  - neuro consulted- reccs: New CTH from 9/26 without any evidence of acute infarct -> can consider repeat CTH down the line in mental status does not improve with treatment of underlying metabolic derangements and infections.    CARDIOVASCULAR  # HTN Urgency   # Septic vs vasoplegic shock   - weaned off levophed on 9/28 at 7:55 am, requiredd 0.01 Levophed yesterday with HD,   -- repeat HD tomorrow __ monitor for stability,    - Holding Labetalol, Catapres , Cardura   - started stress does steroids (9/27 - Solu_Cortef 50 TID, and today  to 25 mg TID   -- cont Bumex for diuresis and consider stopping if no improvement in UOP   -- - last PRBC transfusion on 9/19 -- started ASA today    # Hx of HFpEF with likely ADHF with volume overload.   - ECHO (7/2023) with EF 59 with severe LVH and diastolic dysfunction   - STEPHANIE (8/18) with normal LVRVSF however noted with increased LA pressures and persistent LA septal bowing into RA.   - ECHO (8/11) with EF 60 with mild LVH, normal LVRVSF, and mild ddfxn.  - Remove volume with HD sessions and intermittent bumex pushes as BP allows    - cont Bumex 2mg IV q 6 hrs/s/p bumex gtt/w/ minimal improvement in UOP. -  plan for HD session tomorrow and monitor for UOP    HEENT   # Left ear OE   - ENT evaluation (9/7) with no mastoid tenderness, however found with positive swelling and excoriation to left auricle and tenderness to manipulation of auricle. Debrided crusting of auricle and EAC evaluated with edema and unable to visualize TM. EAC debrided and found with watery exudate.   - s/p CiproHC (9/5 - 9/16)   - Ana Cristina discontinued. Rash on torso concerning for possible drug rash  - ENT following and ear wick change QD   - Wick out but needs ? Debridement wound care called/fu ent   - ENT recommending CT IAC w/ IV con but family is refusing as concerned given CKD, kidneys wouldn't recover from IV contrast. Spoke with Nephrology, ENT, and patient's  at length. Planned for CT non con and per , decision will be made from there but for now doesn't want to risk further harming kidneys.  - CT IAC showing acute on chronic left-sided otitis externa and acute on chronic left-sided otomastoiditis. Superimposed left-sided tympanosclerosis. Superimposed cholesteatoma formation within the left tympanomastoid cavity cannot be excluded. No evidence of malignant otitis externa.  - ENT consulted (9/20) for white purulent discharge from left ear, recc: ENT: acetic acid drops BID to left ear. Mupirocin ointment to the left pinna BID, cover with xeroform after placing mupirocin ointment. Pressure offloading of the left ear.  + L eye redness in setting of surrounding infectious process - Ophthalmology following, see in ID      # Oral Lesions with questionable Zoster vs Trauma?   - Patient with tongue pain and seen with anterior ulcerations consolidating and crusting and posterior ulceration.  - Case discussed with pathology resident and culture/ cytology sent.   - HSV negative and Path (9/6) with normal appearing epithelial cells singly and in clusters devoid of viral cytopathic effect.   - Continue on magic mouth wash for pain    RESPIRATORY  # Acute hypoxic/hypercapnic Respiratory failure likely iso volume overload   - Hx of CKD and HFpEF presented with SOB and hypercarbia second to volume overload requiring intubation and HD initiation   - Vent weaning attempted however limited requiring tracheostomy (9/1) with IP   - Course complicated by PIPs elevated (50s) and respiratory acidosis second to secretions vs volume ?    - Vent settings: 23/340/8/60%  - ABG - ( 28 Sep 2023 02:15 ) pH, Arterial: 7.27  pH, Blood: x     /  pCO2: 55    /  pO2: 53    / HCO3: 25    / Base Excess: -1.9  /  SaO2: 88.7    - POCUS (9/8) with BL pleural effusions (large on right and small on left)   - CT CHEST (9/8) with TBM, BL pleural effusions and continued consolidations   - (9/27) Over the past 24 hrs, pt with increasing O2 requirements and respiratory acidosis with poor TVs. Bedside bronchoscopy (+) severe dynamic airway collapse into distal end of tracheostomy extending to main ines and RM bronchus.  tracheostomy exchanged at bedside to size 6dXLT with repeat bronchoscopy showing some but not complete improvement in dynamic collapse.   - Continue volume removal , plan for repeat HD session tomorrow   - Continue on Duoneb for airway clearance        GI  # UGIB   - CGE x 1 episode on 8/24 and melena x 2 episodes on 8/31 likely residual blood.   - EGD (8/31) found with esophagitis and erosive gastropathy  - last PRBC transfusion on 9/19   - Continue on PPI BID through late October and then PPI QD   - No melena     # Dysphagia   - NGT-TF continued , changed to R nostril today given infections on L side  - Pending PEG however needs improvement in infectious status prior to placement.     RENAL  # Progressive CKD   - Presented volume overload and progressive RUSS on CKD (baseline CRE in 2s and mode into the 3s on admission) likely obstruction vs low flow state with shock vs AIN from drug reaction issue?  - POCUS with no signs of hydronephrosis   - s/p Pressor support started with improvement in renal function  - Attempted steroid course in ICU without improvement in renal function   - HD inititated in ICU for volume overload, held 9/20 iso malfunctioning shiley and stable labs/vol status   - iso worsening hypoxia/respiratory acidosis, L IJ shiley placed 9/27 and HD restarted. s/p 1.3L fluid removal 9/27.   -- HD on 9/27-- neg 1.3 L net and 9/28 with 1.8 L net neg, pt vided 195 over past 24 hrs, with 10-5 cc/ voidind per hr and UOP for LOS is + 2 L, and for 24 hrs is neg 686, plan for repeat HD session tomorrow.   - renal following  - Monitor renal function and UOP, and electrolytes   - Monitor urine output - willard placed on ICU admission, __ d/c willard today and perform TOV       Hyponatremia   - c/w Salt tabs - s/p Hypertonic 1.5%NS @ 50cc/hr x 5 hr  --as per nephrology, Hyponatremia - low but acceptable, on NaCl tabs + intermittent HD    # Hyperphosphatemia (resolved)   - PTH normal and elevated phos likely from renal failure  - s/p Phos binder with Renvela - now d'beth as level wnl      INFECTIOUS DISEASE  # ESBL ECOLI and MSSA VAP c/b MSSA bacteremia   - Course complicated by recurrent fevers, T max 101.1 today, last temp-- 100.5 on 9/20, WBC 8   - RVP/ COVID (8/11) negative   - SCx (8/11) with MSSA s/p cefepime (8/12-8/13) and then ancef (8/14) however noted with drug reaction and then changed to vanco and aztreonam (8/12-8/13) in ICU .   - SCx (9/1) with MSSA and ESBL ECOLI   - BAL (9/1) with MSSA   - BCx (9/1) with MSSA and cleared on (9/4), 9/26-- NGTD,   -- 9/26-- UA with WBC 2489, large leuks, U/Cx-- Candida,   + MSSA PCR on Mupirocin,   - ECHO (9/6) unable to rule out endocarditis   - LE DOPPLERS- Blood and sputum cx NTD; Acute left deep vein thrombosis of the left popliteal vein.  - s/p Ana Cristina (9/4 - 9/22 ) and Vanco by level (last dose 9/22 ) , pt noted w/ new erythematous patchy rash, did not improve after discontinuing the ana cristina, so vanco switched to changed to Daptomycin per ID recs (9/26-), monitor CK's  - s/p fluconazole 200 qd (9/219/28) Dc'd per ID recs given family states rash appeared post fluconazole initiation  - BCx 9/26- NTD, Urine Cx 9/26- NTD, MRSA pcr+ staph  -- recurrent F with 101.1 today __ repeat blood cxs sent, sputum cx sent  -- as per discussion with ID, added meropenem empirically (9/29 - ) and started on Valtrex (9/29- ) as per ophthalmology recs (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 Debrided crusting of auricle and EAC evaluated with edema and unable to visualize TM. EAC debrided and found with watery exudate.   - s/p CiproHC (9/5 - 9/16)     # MRSA PCRs   - MRSA PCR (8/11 and 8/14) negative. + MSSA PCR on Mupirocin,   - Next MRSA + PCR ordered for 10/8     HEME  # Anemia second to GIB vs renal disease   - Hold chemical DVT PPX given bleeding/ waxing and waning HH   - s/p multiple units of PRBCs (8/15, 8/21, 8/28, 8/31 and 9/8)   - Anemia panel with AOCD but with low %sat and ferrous sulfate added to optimize   - Monitor HH and discuss with renal for EPO sometime next week.   - Spoke with GI for clearance to start Heparin gtt for + DVT   - c/w Heparin gtt (9/21-), aPTT are therapeutic-- 59>64, pt's specific-- aPTT goal 60-85     Vascular  # DVT  - Pt with + popliteal DVT on SCD Spoke with GI no absolute contraindication for starting Heparin - advise close monitoring for bleeding - Do stat CTA if bleeding occurs and call IR   - c/w Heparin gtt (9/21-)    ONC   # Hx of Breast CA   - Patient dx in 2018 and s/p BL mastectomy (radical on right) and chemo and RT.   - Supportive care.     ENDOCRINE  # IDDM2   - Monitor FS and adjust as needed   - started stress dose steroids 9/27, tepering down to 25 mg TID today, monitor FS -- 271>306>272  - -increased NPH to 11 U q 6 hrs, continue ISS     LINES/ TUBES  - R IJ SHILEY (8/19 - 9/21)   - L IJ shiley (9/27 - )    SKIN  # Drug Eruption   - Attempted cefepime (8/12) vs ancef (8/13-8/14) and then noted with drug rxn.   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 While in MICU patient noted with worsening renal failure requiring HD initiation, CGE/ Melena s/p EGD 8/31 found with esophagitis and erosive gastropathy, new right cerebellar infarct and fevers second to ESBL COLI and MSSA VAP, MSSA bacteremia, left ear otitis externa and drug rxn to cephalosporins Course further complicated by prolonged vent time s/p tracheostomy 9/1 and transferred to RCU 9/3 completed by recurrent fevers with high PIP, respiratory acidosis and concern for volume overloaded.     NEUROLOGY  # Metabolic Encephalopath vs NEW cerebella infarct   - Baseline MS AOX3 with short term memory changes per family however noted with concern for poor mentation in ICU  - MRI (8/17) with NEW right cerebellar infarct and old left PCA/occipital infarcts  - STEPHANIE (8/17) with AV showing two linear mobile echogenic elements attached to valve leaflets consistent with Lambel's excrescence, but no TRINITY thrombus, and lambel's excrescence with uncertain clinical implication.   - EEG (8/11-8/15) with no seizures   - ILR interrogation (9/7) with SR and PACs falsely recorded as AF.   - Attempted to resume ASA for medical management but held given GIB   - Continue Lipitor   - Course complicated by questionable head and body shaking 9/8 prolactin elevated, however was shaking head yes/no to some questions.   - rCTH 9/26 for worsening encephalopathy-  Vague questionable areas of hypoattenuation within the bilateral cerebellar hemispheres which may be compatible with acute/subacute ischemia. Recommend further evaluation with a brain MRI study, provided there are no MRI contraindications. No acute intracranial hemorrhage. Previously seen questionable vague wedge-shaped area of hypoattenuation in the left parietal lobe with artifactual secondary to motion and volume averaging with a prominent sulcus. Chronic left occipital lobe infarct.  - neuro consulted- reccs: New CTH from 9/26 without any evidence of acute infarct -> can consider repeat CTH down the line in mental status does not improve with treatment of underlying metabolic derangements and infections.    CARDIOVASCULAR  # HTN Urgency   # Septic vs vasoplegic shock   - weaned off levophed on 9/28 at 7:55 am, requiredd 0.01 Levophed yesterday with HD,   -- repeat HD tomorrow __ monitor for stability,    - Holding Labetalol, Catapres , Cardura   - started stress does steroids (9/27 - Solu_Cortef 50 TID, and today  to 25 mg TID   -- cont Bumex for diuresis and consider stopping if no improvement in UOP   -- - last PRBC transfusion on 9/19 -- started ASA today    # Hx of HFpEF with likely ADHF with volume overload.   - ECHO (7/2023) with EF 59 with severe LVH and diastolic dysfunction   - STEPHANIE (8/18) with normal LVRVSF however noted with increased LA pressures and persistent LA septal bowing into RA.   - ECHO (8/11) with EF 60 with mild LVH, normal LVRVSF, and mild ddfxn.  - Remove volume with HD sessions and intermittent bumex pushes as BP allows    - cont Bumex 2mg IV q 6 hrs/s/p bumex gtt/w/ minimal improvement in UOP. -  plan for HD session tomorrow and monitor for UOP    HEENT   # Left ear OE   - ENT evaluation (9/7) with no mastoid tenderness, however found with positive swelling and excoriation to left auricle and tenderness to manipulation of auricle. Debrided crusting of auricle and EAC evaluated with edema and unable to visualize TM. EAC debrided and found with watery exudate.   - s/p CiproHC (9/5 - 9/16)   - Ana Cristina discontinued. Rash on torso concerning for possible drug rash  - ENT following and ear wick change QD   - Wick out but needs ? Debridement wound care called/fu ent   - ENT recommending CT IAC w/ IV con but family is refusing as concerned given CKD, kidneys wouldn't recover from IV contrast. Spoke with Nephrology, ENT, and patient's  at length. Planned for CT non con and per , decision will be made from there but for now doesn't want to risk further harming kidneys.  - CT IAC showing acute on chronic left-sided otitis externa and acute on chronic left-sided otomastoiditis. Superimposed left-sided tympanosclerosis. Superimposed cholesteatoma formation within the left tympanomastoid cavity cannot be excluded. No evidence of malignant otitis externa.  - ENT consulted (9/20) for white purulent discharge from left ear, recc: ENT: acetic acid drops BID to left ear. Mupirocin ointment to the left pinna BID, cover with xeroform after placing mupirocin ointment. Pressure offloading of the left ear.    # + L eye redness in setting of surrounding infectious process - Ophthalmology following, see in ID ,   -- cContinue Ofloxacin 1 drop left eye 6 times a day (ordered by ophtho)  - Continue erythromycin ointment 4 times a day in the left eye (ordered by ophtho)  - PLEASE TAPE LEFT EYE SHUT AT ALL TIMES (can remove tape when administering eye drops)  - Continue preservative free artificial tears 4 times a day in the right eye (ordered by ophtho)  - Continue lacrilube ointment twice a day in the right eye     # Oral Lesions with questionable Zoster vs Trauma?   - Patient with tongue pain and seen with anterior ulcerations consolidating and crusting and posterior ulceration.  - Case discussed with pathology resident and culture/ cytology sent.   - HSV negative and Path (9/6) with normal appearing epithelial cells singly and in clusters devoid of viral cytopathic effect.   - Continue on magic mouth wash for pain    RESPIRATORY  # Acute hypoxic/hypercapnic Respiratory failure likely iso volume overload   - Hx of CKD and HFpEF presented with SOB and hypercarbia second to volume overload requiring intubation and HD initiation   - Vent weaning attempted however limited requiring tracheostomy (9/1) with IP   - Course complicated by PIPs elevated (50s) and respiratory acidosis second to secretions vs volume ?    - Vent settings: 23/340/8/60%  - ABG - ( 28 Sep 2023 02:15 ) pH, Arterial: 7.27  pH, Blood: x     /  pCO2: 55    /  pO2: 53    / HCO3: 25    / Base Excess: -1.9  /  SaO2: 88.7    - POCUS (9/8) with BL pleural effusions (large on right and small on left)   - CT CHEST (9/8) with TBM, BL pleural effusions and continued consolidations   - (9/27) Over the past 24 hrs, pt with increasing O2 requirements and respiratory acidosis with poor TVs. Bedside bronchoscopy (+) severe dynamic airway collapse into distal end of tracheostomy extending to main ines and RM bronchus.  tracheostomy exchanged at bedside to size 6dXLT with repeat bronchoscopy showing some but not complete improvement in dynamic collapse.   - Continue volume removal , plan for repeat HD session tomorrow   - Continue on Duoneb for airway clearance        GI  # UGIB   - CGE x 1 episode on 8/24 and melena x 2 episodes on 8/31 likely residual blood.   - EGD (8/31) found with esophagitis and erosive gastropathy  - last PRBC transfusion on 9/19   - Continue on PPI BID through late October and then PPI QD   - No melena     # Dysphagia   - NGT-TF continued , changed to R nostril today given infections on L side  - Pending PEG however needs improvement in infectious status prior to placement.     RENAL  # Progressive CKD   - Presented volume overload and progressive RUSS on CKD (baseline CRE in 2s and mode into the 3s on admission) likely obstruction vs low flow state with shock vs AIN from drug reaction issue?  - POCUS with no signs of hydronephrosis   - s/p Pressor support started with improvement in renal function  - Attempted steroid course in ICU without improvement in renal function   - HD inititated in ICU for volume overload, held 9/20 iso malfunctioning shiley and stable labs/vol status   - iso worsening hypoxia/respiratory acidosis, L IJ shiley placed 9/27 and HD restarted. s/p 1.3L fluid removal 9/27.   -- HD on 9/27-- neg 1.3 L net and 9/28 with 1.8 L net neg, pt vided 195 over past 24 hrs, with 10-5 cc/ voidind per hr and UOP for LOS is + 2 L, and for 24 hrs is neg 686, plan for repeat HD session tomorrow.   - renal following  - Monitor renal function and UOP, and electrolytes   - Monitor urine output - willard placed on ICU admission, __ d/c willard today and perform TOV       Hyponatremia   - c/w Salt tabs - s/p Hypertonic 1.5%NS @ 50cc/hr x 5 hr  --as per nephrology, Hyponatremia - low but acceptable, on NaCl tabs + intermittent HD    # Hyperphosphatemia (resolved)   - PTH normal and elevated phos likely from renal failure  - s/p Phos binder with Renvela - now d'beth as level wnl      INFECTIOUS DISEASE  # ESBL ECOLI and MSSA VAP c/b MSSA bacteremia   - Course complicated by recurrent fevers, T max 101.1 today, last temp-- 100.5 on 9/20, WBC 8   - RVP/ COVID (8/11) negative   - SCx (8/11) with MSSA s/p cefepime (8/12-8/13) and then ancef (8/14) however noted with drug reaction and then changed to vanco and aztreonam (8/12-8/13) in ICU .   - SCx (9/1) with MSSA and ESBL ECOLI   - BAL (9/1) with MSSA   - BCx (9/1) with MSSA and cleared on (9/4), 9/26-- NGTD,   -- 9/26-- UA with WBC 2489, large leuks, U/Cx-- Candida,   + MSSA PCR on Mupirocin,   - ECHO (9/6) unable to rule out endocarditis   - LE DOPPLERS- Blood and sputum cx NTD; Acute left deep vein thrombosis of the left popliteal vein.  - s/p Ana Cristina (9/4 - 9/22 ) and Vanco by level (last dose 9/22 ) , pt noted w/ new erythematous patchy rash, did not improve after discontinuing the ana cristina, so vanco switched to changed to Daptomycin per ID recs (9/26-), monitor CK's  - s/p fluconazole 200 qd (9/219/28) Dc'd per ID recs given family states rash appeared post fluconazole initiation  - BCx 9/26- NTD, Urine Cx 9/26- NTD, MRSA pcr+ staph  -- recurrent F with 101.1 today __ repeat blood cxs sent, sputum cx sent  -- as per discussion with ID, added meropenem empirically (9/29 - ) and started on Valtrex (9/29- ) as per ophthalmology recs (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 Debrided crusting of auricle and EAC evaluated with edema and unable to visualize TM. EAC debrided and found with watery exudate.   - s/p CiproHC (9/5 - 9/16)     # MRSA PCRs   - MRSA PCR (8/11 and 8/14) negative. + MSSA PCR on Mupirocin,   - Next MRSA + PCR ordered for 10/8     HEME  # Anemia second to GIB vs renal disease   - Hold chemical DVT PPX given bleeding/ waxing and waning HH   - s/p multiple units of PRBCs (8/15, 8/21, 8/28, 8/31 and 9/8)   - Anemia panel with AOCD but with low %sat and ferrous sulfate added to optimize   - Monitor HH and discuss with renal for EPO sometime next week.   - Spoke with GI for clearance to start Heparin gtt for + DVT   - c/w Heparin gtt (9/21-), aPTT are therapeutic-- 59>64, pt's specific-- aPTT goal 60-85     Vascular  # DVT  - Pt with + popliteal DVT on SCD Spoke with GI no absolute contraindication for starting Heparin - advise close monitoring for bleeding - Do stat CTA if bleeding occurs and call IR   - c/w Heparin gtt (9/21-)    ONC   # Hx of Breast CA   - Patient dx in 2018 and s/p BL mastectomy (radical on right) and chemo and RT.   - Supportive care.     ENDOCRINE  # IDDM2   - Monitor FS and adjust as needed   - started stress dose steroids 9/27, tepering down to 25 mg TID today, monitor FS -- 271>306>272  - -increased NPH to 11 U q 6 hrs, continue ISS     LINES/ TUBES  - R ARTHUR TAYLOR (8/19 - 9/21)   - L ARTHUR taylor (9/27 - )    SKIN  # Drug Eruption   - Attempted cefepime (8/12) vs ancef (8/13-8/14) and then noted with drug rxn.   - Hold further cephalosporins at this time   - s/p Steroids with prednisone 40 (8/18 - 9/2) then prednisone 30 (9/3-9/7), then prednisone 20 (9/8 - 9/10), then prednisone 10mg (9/11-9/13)      ETHICS/ GOC    - FULL CODE   -- pt's spouse at the bedside and was updated on pt;s condition, all questions were answered     DISPO: MICU

## 2023-09-29 NOTE — PROGRESS NOTE ADULT - SUBJECTIVE AND OBJECTIVE BOX
Overnight events noted  Off pressor gtts/off sedation; on Heparin gtt  Tolerated HD yesterday with net 1.8L UF    VITAL:  T(C): , Max: 38.4 (09-29-23 @ 04:00)  T(F): , Max: 101.1 (09-29-23 @ 04:00)  HR: 116 (09-29-23 @ 13:00)  BP: 93/74 (09-29-23 @ 13:00)  BP(mean): 78 (09-29-23 @ 13:00)  RR: 22 (09-29-23 @ 13:00)  SpO2: 97% (09-29-23 @ 13:00)  urine output 225cc/24h    PHYSICAL EXAM:  Constitutional: sedated  HEENT: trach-vent, (+)NGT  Respiratory: coarse BS b/l  Cardiovascular: tachy reg s1s2  Gastrointestinal: BS+, soft, NT/ND  Extremities: + peripheral edema  Neurological: reduced generalized strength   : (+) Wheeler  Skin: No rashes  Access: (+)[facility]      LABS:                        7.4    8.30  )-----------( 303      ( 29 Sep 2023 03:21 )             23.0     Na(129)/K(3.6)/Cl(91)/HCO3(25)/BUN(55)/Cr(1.78)Glu(263)/Ca(9.1)/Mg(2.20)/PO4(2.8)    09-29 @ 03:21  Na(131)/K(4.2)/Cl(93)/HCO3(23)/BUN(82)/Cr(2.36)Glu(255)/Ca(9.4)/Mg(2.30)/PO4(3.6)    09-28 @ 02:15  Na(132)/K(4.1)/Cl(95)/HCO3(25)/BUN(78)/Cr(2.25)Glu(228)/Ca(9.1)/Mg(2.40)/PO4(3.8)    09-27 @ 23:30  Na(129)/K(4.3)/Cl(91)/HCO3(22)/BUN(107)/Cr(2.99)Glu(188)/Ca(9.6)/Mg(2.60)/PO4(4.7)    09-27 @ 02:50  Na(125)/K(4.5)/Cl(91)/HCO3(25)/BUN(110)/Cr(2.97)Glu(168)/Ca(9.3)/Mg(2.50)/PO4(4.4)    09-26 @ 16:05        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    (2)RUSS - new RUSS - hemodynamic - s/p each of the past 2 days; we can hold off with next HD until tomorrow    (3)Hyponatremia - low but acceptable, on NaCl tabs + intermittent HD    (4)Pulm- vent-dependent    (5)ID - now on Meropenem IV - dosed for low GFR    (6)Anemia - receiving DAVID and PRBCs intermittently    (6)CV - tenuous hemodynamics - off pressor gtts/on Midodrine    RECOMMEND:  (1)Next HD tomorrow - x 2.5h, 1.5kg UF as able, pressors as needed to keep SBP>90  (2)Dose new meds for GFR 10-15ml/min              Jimmy Colon MD  [facility]  Office/on call physician: [phone]  Cell (7a-7p): [phone]       Overnight events noted  Off pressor gtts/off sedation; on Heparin gtt  Tolerated HD yesterday with net 1.8L UF    VITAL:  T(C): , Max: 38.4 (09-29-23 @ 04:00)  T(F): , Max: 101.1 (09-29-23 @ 04:00)  HR: 116 (09-29-23 @ 13:00)  BP: 93/74 (09-29-23 @ 13:00)  BP(mean): 78 (09-29-23 @ 13:00)  RR: 22 (09-29-23 @ 13:00)  SpO2: 97% (09-29-23 @ 13:00)  urine output 225cc/24h    PHYSICAL EXAM:  Constitutional: sedated  HEENT: trach-vent, (+)NGT  Respiratory: coarse BS b/l  Cardiovascular: tachy reg s1s2  Gastrointestinal: BS+, soft, NT/ND  Extremities: + peripheral edema  Neurological: reduced generalized strength   : (+) Wheeler  Skin: No rashes  Access: (+)[facility]      LABS:                        7.4    8.30  )-----------( 303      ( 29 Sep 2023 03:21 )             23.0     Na(129)/K(3.6)/Cl(91)/HCO3(25)/BUN(55)/Cr(1.78)Glu(263)/Ca(9.1)/Mg(2.20)/PO4(2.8)    09-29 @ 03:21  Na(131)/K(4.2)/Cl(93)/HCO3(23)/BUN(82)/Cr(2.36)Glu(255)/Ca(9.4)/Mg(2.30)/PO4(3.6)    09-28 @ 02:15  Na(132)/K(4.1)/Cl(95)/HCO3(25)/BUN(78)/Cr(2.25)Glu(228)/Ca(9.1)/Mg(2.40)/PO4(3.8)    09-27 @ 23:30  Na(129)/K(4.3)/Cl(91)/HCO3(22)/BUN(107)/Cr(2.99)Glu(188)/Ca(9.6)/Mg(2.60)/PO4(4.7)    09-27 @ 02:50  Na(125)/K(4.5)/Cl(91)/HCO3(25)/BUN(110)/Cr(2.97)Glu(168)/Ca(9.3)/Mg(2.50)/PO4(4.4)    09-26 @ 16:05        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  Office/on call physician: [phone]  Cell (7a-7p): [phone]       Overnight events noted  Off pressor gtts/off sedation; on Heparin gtt  Tolerated HD yesterday with net 1.8L UF   at bedside      VITAL:  T(C): , Max: 38.4 (09-29-23 @ 04:00)  T(F): , Max: 101.1 (09-29-23 @ 04:00)  HR: 116 (09-29-23 @ 13:00)  BP: 93/74 (09-29-23 @ 13:00)  BP(mean): 78 (09-29-23 @ 13:00)  RR: 22 (09-29-23 @ 13:00)  SpO2: 97% (09-29-23 @ 13:00)  urine output 225cc/24h    PHYSICAL EXAM:  Constitutional: sedated  HEENT: trach-vent, (+)NGT  Respiratory: coarse BS b/l  Cardiovascular: tachy reg s1s2  Gastrointestinal: BS+, soft, NT/ND  Extremities: + peripheral edema  Neurological: reduced generalized strength   : (+) Wheeler  Skin: No rashes  Access: (+)[facility]      LABS:                        7.4    8.30  )-----------( 303      ( 29 Sep 2023 03:21 )             23.0     Na(129)/K(3.6)/Cl(91)/HCO3(25)/BUN(55)/Cr(1.78)Glu(263)/Ca(9.1)/Mg(2.20)/PO4(2.8)    09-29 @ 03:21  Na(131)/K(4.2)/Cl(93)/HCO3(23)/BUN(82)/Cr(2.36)Glu(255)/Ca(9.4)/Mg(2.30)/PO4(3.6)    09-28 @ 02:15  Na(132)/K(4.1)/Cl(95)/HCO3(25)/BUN(78)/Cr(2.25)Glu(228)/Ca(9.1)/Mg(2.40)/PO4(3.8)    09-27 @ 23:30  Na(129)/K(4.3)/Cl(91)/HCO3(22)/BUN(107)/Cr(2.99)Glu(188)/Ca(9.6)/Mg(2.60)/PO4(4.7)    09-27 @ 02:50  Na(125)/K(4.5)/Cl(91)/HCO3(25)/BUN(110)/Cr(2.97)Glu(168)/Ca(9.3)/Mg(2.50)/PO4(4.4)    09-26 @ 16:05        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    (2)RUSS - new RUSS - hemodynamic - s/p each of the past 2 days; we can hold off with next HD until tomorrow    (3)Hyponatremia - low but acceptable, on NaCl tabs + intermittent HD    (4)Pulm- vent-dependent    (5)ID - now on Meropenem IV - dosed for low GFR    (6)Anemia - receiving DAVID and PRBCs intermittently    (6)CV - tenuous hemodynamics - off pressor gtts/on Midodrine    RECOMMEND:  (1)Next HD tomorrow - x 3h, 2kg UF as able, pressors as needed to keep SBP>90  (2)Dose new meds for GFR 10-15ml/min     counseled/answered all questions to his satisfaction          Jimmy Colon MD  [facility]  Office/on call physician: [phone]  Cell (7a-7p): [phone]

## 2023-09-29 NOTE — PROGRESS NOTE ADULT - SUBJECTIVE AND OBJECTIVE BOX
Follow Up:  MSSA bacteremia    Interval History: pt again febrile and tachy    ROS:    Unobtainable because: trached            Allergies  isoniazid (Rash)  nafcillin (Unknown)  hydrALAZINE (Rash)  vitamin E (Short breath; Urticaria; Hives)  doxycycline (Rash)  cefepime (Rash)  NIFEdipine (Urticaria; Hives)        ANTIMICROBIALS:  DAPTOmycin IVPB 500 every 48 hours      OTHER MEDS:  acetaminophen   Oral Liquid .. 650 milliGRAM(s) Oral every 6 hours PRN  acetic acid 0.25% Topical Irrigation 1 Application(s) Topical two times a day  albuterol/ipratropium for Nebulization 3 milliLiter(s) Nebulizer every 6 hours  artificial tears (preservative free) Ophthalmic Solution 1 Drop(s) Right EYE every 2 hours  artificial tears (preservative free) Ophthalmic Solution 1 Drop(s) Both EYES three times a day PRN  atorvastatin 40 milliGRAM(s) Oral at bedtime  buMETAnide Injectable 2 milliGRAM(s) IV Push <User Schedule>  calamine/zinc oxide Lotion 1 Application(s) Topical two times a day PRN  chlorhexidine 0.12% Liquid 15 milliLiter(s) Oral Mucosa every 12 hours  chlorhexidine 2% Cloths 1 Application(s) Topical daily  chlorhexidine 4% Liquid 1 Application(s) Topical <User Schedule>  dextrose 5%. 1000 milliLiter(s) IV Continuous <Continuous>  dextrose 5%. 1000 milliLiter(s) IV Continuous <Continuous>  dextrose 50% Injectable 25 Gram(s) IV Push once  dextrose 50% Injectable 25 Gram(s) IV Push once  dextrose 50% Injectable 12.5 Gram(s) IV Push once  dextrose Oral Gel 15 Gram(s) Oral once PRN  epoetin odalis (EPOGEN) Injectable 93013 Unit(s) SubCutaneous <User Schedule>  erythromycin   Ointment 1 Application(s) Left EYE four times a day  ferrous    sulfate Liquid 300 milliGRAM(s) Oral daily  glucagon  Injectable 1 milliGRAM(s) IntraMuscular once  heparin  Infusion 1050 Unit(s)/Hr IV Continuous <Continuous>  hydrocortisone sodium succinate Injectable 50 milliGRAM(s) IV Push every 8 hours  insulin lispro (ADMELOG) corrective regimen sliding scale   SubCutaneous every 6 hours  insulin NPH human recombinant 11 Unit(s) SubCutaneous every 6 hours  midodrine. 20 milliGRAM(s) Oral every 8 hours  mupirocin 2% Ointment 1 Application(s) Topical two times a day  ofloxacin 0.3% Solution 1 Drop(s) Left EYE every 2 hours  pantoprazole  Injectable 40 milliGRAM(s) IV Push two times a day  petrolatum Ophthalmic Ointment 1 Application(s) Right EYE two times a day  potassium chloride   Powder 20 milliEquivalent(s) Enteral Tube once  psyllium Powder 1 Packet(s) Oral daily  sodium chloride 2 Gram(s) Oral two times a day  sodium chloride 0.9% lock flush 10 milliLiter(s) IV Push every 1 hour PRN      Vital Signs Last 24 Hrs  T(C): 38.1 (29 Sep 2023 08:00), Max: 38.4 (29 Sep 2023 04:00)  T(F): 100.5 (29 Sep 2023 08:00), Max: 101.1 (29 Sep 2023 04:00)  HR: 106 (29 Sep 2023 10:00) (78 - 118)  BP: 108/34 (29 Sep 2023 10:00) (84/62 - 124/30)  BP(mean): 51 (29 Sep 2023 10:00) (38 - 98)  RR: 22 (29 Sep 2023 10:00) (18 - 26)  SpO2: 100% (29 Sep 2023 10:00) (93% - 100%)    Parameters below as of 29 Sep 2023 10:00  Patient On (Oxygen Delivery Method): ventilator    O2 Concentration (%): 50    Physical Exam:  General:    trached   ENT: L ear with crusted dark drainage  Respiratory:   trach on vent  abd:  distended but soft  :     willard  Musculoskeletal : generalized edema  Skin:  improved rash  vascular: TRISHA odom                              7.4    8.30  )-----------( 303      ( 29 Sep 2023 03:21 )             23.0       09-29    129<L>  |  91<L>  |  55<H>  ----------------------------<  263<H>  3.6   |  25  |  1.78<H>    Ca    9.1      29 Sep 2023 03:21  Phos  2.8     09-29  Mg     2.20     09-29    TPro  5.8<L>  /  Alb  2.4<L>  /  TBili  0.3  /  DBili  x   /  AST  24  /  ALT  11  /  AlkPhos  336<H>  09-29      Urinalysis Basic - ( 29 Sep 2023 03:21 )    Color: x / Appearance: x / SG: x / pH: x  Gluc: 263 mg/dL / Ketone: x  / Bili: x / Urobili: x   Blood: x / Protein: x / Nitrite: x   Leuk Esterase: x / RBC: x / WBC x   Sq Epi: x / Non Sq Epi: x / Bacteria: x        MICROBIOLOGY:  v  Clean Catch Clean Catch (Midstream)  09-26-23   10,000 - 49,000 CFU/mL Candida albicans "Susceptibilities not performed"  --  --      .Blood Blood-Peripheral  09-26-23   No growth at 48 Hours  --  --      .Blood Blood-Venous  09-20-23   No growth at 5 days  --  --      .Blood Blood-Peripheral  09-20-23   No growth at 5 days  --  --      Ear Ear  09-13-23   Moderate Candida albicans "Susceptibilities not performed"  --  --      .Blood Blood-Venous  09-10-23   No growth at 5 days  --  --      .Sputum Sputum  09-10-23   Normal Respiratory Cate present  --    Rare polymorphonuclear leukocytes per low power field  No Squamous epithelial cells per low power field  Rare Yeast like cells seen per oil power field  Rare Gram Positive Cocci in Clusters seen per oil power field      .Blood Blood-Peripheral  09-10-23   No growth at 5 days  --  --      .Sputum Sputum  09-08-23   Normal Respiratory Cate present  --    Numerous polymorphonuclear leukocytes per low power field  Numerous Squamous epithelial cells per low power field  Few Yeast like cells per oil power field  Results consistent with oropharyngeal contamination      .Blood Blood-Peripheral  09-08-23   No growth at 5 days  --  --      Ear Ear  09-06-23   No growth at 5 days  --  --      .Blood Blood-Peripheral  09-04-23   No growth at 5 days  --  --      .Bronchial Bronchial Lavage  09-01-23   Numerous Staphylococcus aureus Multiple Morphological Strains  Normal Respiratory Cate present  --  Staphylococcus aureus  Staphylococcus aureus      .Blood Blood-Peripheral  09-01-23   Growth in aerobic and anaerobic bottles: Staphylococcus aureus  Direct identification is available within approximately 3-5  hours either by Blood Panel Multiplexed PCR or Direct  MALDI-TOF. Details: [email or web address]  Growth in anaerobic bottle: blood culture bottle turned positive after  the final report was released.  --  Blood Culture PCR  Staphylococcus aureus      ET Tube ET Tube  09-01-23   Moderate Staphylococcus aureus  Moderate Escherichia coli ESBL  Normal Respiratory Cate present  --  Staphylococcus aureus  Escherichia coli ESBL          Rapid RVP Result: NotDetec (09-26 @ 16:30)        RADIOLOGY:  Images independently visualized and reviewed personally, findings as below  < from: Xray Chest 1 View- PORTABLE-Urgent (Xray Chest 1 View- PORTABLE-Urgent .) (09.28.23 @ 01:16) >  IMPRESSION:  Bilateral layering effusions, right greater than left, that appears   similar as compared with 9/20/2023.    Perihilar interstitial opacities, likely pulmonary edema.    < end of copied text >  < from: CT Head No Cont (09.26.23 @ 21:02) >    IMPRESSION: Vague questionable areas of hypoattenuation within the   bilateral cerebellar hemispheres which may be compatible with   acute/subacute ischemia. Recommend further evaluation with a brain MRI   study, provided there are no MRI contraindications.    No acute intracranial hemorrhage.    Previously seen questionable vague wedge-shaped area of hypoattenuation   in the left parietal lobe with artifactual secondary to motion and volume   averaging with a prominent sulcus.      < end of copied text >

## 2023-09-29 NOTE — PROGRESS NOTE ADULT - SUBJECTIVE AND OBJECTIVE BOX
Significant recent/past 24 hr events:    Subjective:    Review of Systems         [ ] A ten-point review of systems was otherwise negative except as noted.  [ ] Due to altered mental status/intubation, subjective information were not able to be obtained from the patient. History was obtained, to the extent possible, from review of the chart and collateral sources of information.      Patient is a 73y old  Female who presents with a chief complaint of Respiratory distress (28 Sep 2023 19:46)    HPI:  72 y/o F DM on insulin, HTN, CKD,breast CA, respiratory arrest and cardiac arrest (2018), CVA with residual weakness, aspiration PNA h/o trache, PEG, both removed presents with respiratory distress requiring intubation. Had recent admission d/c 7/22/23 for cough and respiratory distress (no intubation) thought to be i/s/o aspiration PNA s/p cefepime course; developed rash in response. Infectious w/u was negative at this time. Was discharged home, daughter and  at bedside providing history.     Reports that she was initially improving with O2 sats in the high 90s on room air, however, then became more lethargic and not herself (baseline mental status AOx3). Also had worsening shortness of breath while laying down and leg swelling. Contacted cardiologist who started her on bumex 1mg daily. Developed low sugars overnight before admission and was given juice with some food, daughter reports no coughing during episode. At around 4-5 AM developed vomiting and coughing, O2 sats dropped to 80s and EMS was called. Denies fevers/chills, dysuria or urinary frequency, chest pain, palpitations. Uses wheelchair at home however family moves her around frequently.     In ED, was on BiPAP with increased WOB and was intubated. Found to have elevated pro-BNP and CO2 of 111 on VBG. CXR with small right pleural effusion, started on vanc in the ED.  (11 Aug 2023 09:08)    PAST MEDICAL & SURGICAL HISTORY:  Breast CA      Diabetes      Stroke      Cardiac arrest      HTN (hypertension)      H/O mastectomy, bilateral        FAMILY HISTORY:  No pertinent family history in first degree relatives        Vitals   ICU Vital Signs Last 24 Hrs  T(C): 38.4 (29 Sep 2023 04:00), Max: 38.4 (29 Sep 2023 04:00)  T(F): 101.1 (29 Sep 2023 04:00), Max: 101.1 (29 Sep 2023 04:00)  HR: 106 (29 Sep 2023 06:00) (78 - 118)  BP: 116/37 (29 Sep 2023 06:00) (84/62 - 137/98)  BP(mean): 57 (29 Sep 2023 06:00) (38 - 106)  ABP: --  ABP(mean): --  RR: 22 (29 Sep 2023 06:00) (22 - 27)  SpO2: 99% (29 Sep 2023 06:00) (93% - 100%)    O2 Parameters below as of 29 Sep 2023 06:00  Patient On (Oxygen Delivery Method): ventilator    O2 Concentration (%): 50        Physical Exam:   Constitutional: NAD, well-groomed, well-developed  HEENT: PERRLA, EOMI, no drainage or redness  Neck: supple,  No JVD, Trachea midline  Back: Normal spine flexure, No CVA tenderness, No deformity or limitation of movement  Respiratory: Breath Sounds equal & clear bilaterally to auscultation, no accessory muscle use noted  Cardiovascular: Regular rate, regular rhythm, normal S1, S2; no murmurs or rub  Gastrointestinal: Soft, non-tender, non distended, no hepatosplenomegaly, normal bowel sounds  Extremities: CENTENO x 4, no peripheral edema, no cyanosis, no clubbing   Vascular: Equal and normal pulses: 2+ peripheral pulses throughout  Neurological: A+O x 3; speech clear and intact; no sensory, motor  deficits, normal reflexes  Psychiatric: calm, normal mood, normal affect  Musculoskeletal: No joint swelling or deformity; no limitation of movement  Skin: warm, dry, well perfused, no rashes    VENT SETTINGS   Mode: AC/ CMV (Assist Control/ Continuous Mandatory Ventilation)  RR (machine): 22  TV (machine): 340  FiO2: 50  PEEP: 8  ITime: 0.78  MAP: 16  PIP: 38    ABG - ( 28 Sep 2023 02:15 )  pH, Arterial: 7.27  pH, Blood: x     /  pCO2: 55    /  pO2: 53    / HCO3: 25    / Base Excess: -1.9  /  SaO2: 88.7                I&O's Detail    28 Sep 2023 07:01  -  29 Sep 2023 07:00  --------------------------------------------------------  IN:    Dexmedetomidine: 90 mL    Enteral Tube Flush: 150 mL    Heparin: 206.5 mL    IV PiggyBack: 100 mL    Nepro with Carb Steady: 760 mL    Norepinephrine: 2.5 mL    Other (mL): 400 mL  Total IN: 1709 mL    OUT:    Indwelling Catheter - Urethral (mL): 195 mL    Other (mL): 2200 mL  Total OUT: 2395 mL    Total NET: -686 mL          LABS                        7.4    8.30  )-----------( 303      ( 29 Sep 2023 03:21 )             23.0     09-29    129<L>  |  91<L>  |  55<H>  ----------------------------<  263<H>  3.6   |  25  |  1.78<H>    Ca    9.1      29 Sep 2023 03:21  Phos  2.8     09-29  Mg     2.20     09-29    TPro  5.8<L>  /  Alb  2.4<L>  /  TBili  0.3  /  DBili  x   /  AST  24  /  ALT  11  /  AlkPhos  336<H>  09-29    LIVER FUNCTIONS - ( 29 Sep 2023 03:21 )  Alb: 2.4 g/dL / Pro: 5.8 g/dL / ALK PHOS: 336 U/L / ALT: 11 U/L / AST: 24 U/L / GGT: x           PTT - ( 29 Sep 2023 03:21 )  PTT:64.8 sec        Urinalysis Basic - ( 29 Sep 2023 03:21 )    Color: x / Appearance: x / SG: x / pH: x  Gluc: 263 mg/dL / Ketone: x  / Bili: x / Urobili: x   Blood: x / Protein: x / Nitrite: x   Leuk Esterase: x / RBC: x / WBC x   Sq Epi: x / Non Sq Epi: x / Bacteria: x      POCT Blood Glucose.: 306 mg/dL *H* (09-29-23 @ 06:05)  POCT Blood Glucose.: 271 mg/dL *H* (09-28-23 @ 23:28)  POCT Blood Glucose.: 216 mg/dL *H* (09-28-23 @ 17:21)  POCT Blood Glucose.: 312 mg/dL *H* (09-28-23 @ 11:30)        MEDICATIONS  (STANDING):  acetic acid 0.25% Topical Irrigation 1 Application(s) Topical two times a day  albuterol/ipratropium for Nebulization 3 milliLiter(s) Nebulizer every 6 hours  artificial tears (preservative free) Ophthalmic Solution 1 Drop(s) Right EYE every 2 hours  atorvastatin 40 milliGRAM(s) Oral at bedtime  buMETAnide Injectable 2 milliGRAM(s) IV Push <User Schedule>  chlorhexidine 0.12% Liquid 15 milliLiter(s) Oral Mucosa every 12 hours  chlorhexidine 2% Cloths 1 Application(s) Topical daily  chlorhexidine 4% Liquid 1 Application(s) Topical <User Schedule>  DAPTOmycin IVPB 500 milliGRAM(s) IV Intermittent every 48 hours  dextrose 5%. 1000 milliLiter(s) (100 mL/Hr) IV Continuous <Continuous>  dextrose 5%. 1000 milliLiter(s) (50 mL/Hr) IV Continuous <Continuous>  dextrose 50% Injectable 25 Gram(s) IV Push once  dextrose 50% Injectable 25 Gram(s) IV Push once  dextrose 50% Injectable 12.5 Gram(s) IV Push once  epoetin odalis (EPOGEN) Injectable 86017 Unit(s) SubCutaneous <User Schedule>  erythromycin   Ointment 1 Application(s) Left EYE four times a day  ferrous    sulfate Liquid 300 milliGRAM(s) Oral daily  glucagon  Injectable 1 milliGRAM(s) IntraMuscular once  heparin  Infusion 1050 Unit(s)/Hr (10 mL/Hr) IV Continuous <Continuous>  hydrocortisone sodium succinate Injectable 50 milliGRAM(s) IV Push every 8 hours  insulin lispro (ADMELOG) corrective regimen sliding scale   SubCutaneous every 6 hours  insulin NPH human recombinant 11 Unit(s) SubCutaneous every 6 hours  midodrine. 20 milliGRAM(s) Oral every 8 hours  mupirocin 2% Ointment 1 Application(s) Topical two times a day  ofloxacin 0.3% Solution 1 Drop(s) Left EYE every 2 hours  pantoprazole  Injectable 40 milliGRAM(s) IV Push two times a day  petrolatum Ophthalmic Ointment 1 Application(s) Right EYE two times a day  psyllium Powder 1 Packet(s) Oral daily  sodium chloride 2 Gram(s) Oral two times a day    MEDICATIONS  (PRN):  acetaminophen   Oral Liquid .. 650 milliGRAM(s) Oral every 6 hours PRN Temp greater or equal to 38C (100.4F), Mild Pain (1 - 3)  artificial tears (preservative free) Ophthalmic Solution 1 Drop(s) Both EYES three times a day PRN Dry Eyes  calamine/zinc oxide Lotion 1 Application(s) Topical two times a day PRN Rash and/or Itching  dextrose Oral Gel 15 Gram(s) Oral once PRN Blood Glucose LESS THAN 70 milliGRAM(s)/deciliter  sodium chloride 0.9% lock flush 10 milliLiter(s) IV Push every 1 hour PRN Pre/post blood products, medications, blood draw, and to maintain line patency      Allergies:  isoniazid (Rash)  nafcillin (Unknown)  hydrALAZINE (Rash)  vitamin E (Short breath; Urticaria; Hives)  doxycycline (Rash)  cefepime (Rash)  NIFEdipine (Urticaria; Hives)        CRITICAL CARE TIME SPENT:  minutes of critical care time spent providing medical care for patient's acute illness/conditions that impairs at least one vital organ system and/or poses a high risk of imminent or life threatening deterioration in the patient's condition. It includes time spent evaluating and treating the patient's acute illness as well as time spent reviewing labs, radiology, discussing goals of care with patient and/or patient's family, and discussing the case with a multidisciplinary team, in an effort to prevent further life threatening deterioration or end organ damage. This time is independent of any procedures performed.         Significant recent/past 24 hr events: T max 101.1, last tep on 9/20 -- 100.5., pt was  titrated off Precedex,   -- SpO2 in 70's and high peak pressures to 50's overnight, requiring pulmonary toileting with lavage, with improvement in SpO2,   -- pt failed PS 12, PEEP 5 within 1 min     Subjective: pt cannot provide any history in s/o sedation/encephalopathy     Review of Systems         [ ] A ten-point review of systems was otherwise negative except as noted.  [ ] Due to altered mental status/intubation, subjective information were not able to be obtained from the patient. History was obtained, to the extent possible, from review of the chart and collateral sources of information.      Patient is a 73y old  Female who presents with a chief complaint of Respiratory distress (28 Sep 2023 19:46)    HPI:  72 y/o F DM on insulin, HTN, CKD,breast CA, respiratory arrest and cardiac arrest (2018), CVA with residual weakness, aspiration PNA h/o trache, PEG, both removed presents with respiratory distress requiring intubation. Had recent admission d/c 7/22/23 for cough and respiratory distress (no intubation) thought to be i/s/o aspiration PNA s/p cefepime course; developed rash in response. Infectious w/u was negative at this time. Was discharged home, daughter and  at bedside providing history.     Reports that she was initially improving with O2 sats in the high 90s on room air, however, then became more lethargic and not herself (baseline mental status AOx3). Also had worsening shortness of breath while laying down and leg swelling. Contacted cardiologist who started her on bumex 1mg daily. Developed low sugars overnight before admission and was given juice with some food, daughter reports no coughing during episode. At around 4-5 AM developed vomiting and coughing, O2 sats dropped to 80s and EMS was called. Denies fevers/chills, dysuria or urinary frequency, chest pain, palpitations. Uses wheelchair at home however family moves her around frequently.     In ED, was on BiPAP with increased WOB and was intubated. Found to have elevated pro-BNP and CO2 of 111 on VBG. CXR with small right pleural effusion, started on vanc in the ED.  (11 Aug 2023 09:08)    PAST MEDICAL & SURGICAL HISTORY:  Breast CA      Diabetes      Stroke      Cardiac arrest      HTN (hypertension)      H/O mastectomy, bilateral        FAMILY HISTORY:  No pertinent family history in first degree relatives        Vitals   ICU Vital Signs Last 24 Hrs  T(C): 38.4 (29 Sep 2023 04:00), Max: 38.4 (29 Sep 2023 04:00)  T(F): 101.1 (29 Sep 2023 04:00), Max: 101.1 (29 Sep 2023 04:00)  HR: 106 (29 Sep 2023 06:00) (78 - 118)  BP: 116/37 (29 Sep 2023 06:00) (84/62 - 137/98)  BP(mean): 57 (29 Sep 2023 06:00) (38 - 106)  ABP: --  ABP(mean): --  RR: 22 (29 Sep 2023 06:00) (22 - 27)  SpO2: 99% (29 Sep 2023 06:00) (93% - 100%)    O2 Parameters below as of 29 Sep 2023 06:00  Patient On (Oxygen Delivery Method): ventilator    O2 Concentration (%): 50        Physical Exam:   Constitutional: NAD, well-groomed, well-developed  HEENT: PERRLA, EOMI, no drainage or redness  Neck: supple,  No JVD, Trachea midline  Back: Normal spine flexure, No CVA tenderness, No deformity or limitation of movement  Respiratory: Breath Sounds diminished bilaterally to auscultation, no accessory muscle use noted  Cardiovascular: Regular rate, regular rhythm, normal S1, S2; no murmurs or rub  Gastrointestinal: Soft, non-tender, non distended, no hepatosplenomegaly, normal bowel sounds  Extremities: CENTENO x 4, 2 + peripheral edema, no cyanosis, no clubbing   Vascular: Equal and normal pulses: 2+ peripheral pulses throughout  Neurological: pt opens eyes spontaneously; not following commands, no sensory, motor  deficits, normal reflexes  Psychiatric: calm, normal mood, normal affect  Musculoskeletal: No joint swelling or deformity; no limitation of movement  Skin: warm, dry, well perfused, no rashes    VENT SETTINGS   Mode: AC/ CMV (Assist Control/ Continuous Mandatory Ventilation)  RR (machine): 22  TV (machine): 340  FiO2: 50  PEEP: 8  ITime: 0.78  MAP: 16  PIP: 38    ABG - ( 28 Sep 2023 02:15 )  pH, Arterial: 7.27  pH, Blood: x     /  pCO2: 55    /  pO2: 53    / HCO3: 25    / Base Excess: -1.9  /  SaO2: 88.7                I&O's Detail    28 Sep 2023 07:01  -  29 Sep 2023 07:00  --------------------------------------------------------  IN:    Dexmedetomidine: 90 mL    Enteral Tube Flush: 150 mL    Heparin: 206.5 mL    IV PiggyBack: 100 mL    Nepro with Carb Steady: 760 mL    Norepinephrine: 2.5 mL    Other (mL): 400 mL  Total IN: 1709 mL    OUT:    Indwelling Catheter - Urethral (mL): 195 mL    Other (mL): 2200 mL  Total OUT: 2395 mL    Total NET: -686 mL          LABS                        7.4    8.30  )-----------( 303      ( 29 Sep 2023 03:21 )             23.0     09-29    129<L>  |  91<L>  |  55<H>  ----------------------------<  263<H>  3.6   |  25  |  1.78<H>    Ca    9.1      29 Sep 2023 03:21  Phos  2.8     09-29  Mg     2.20     09-29    TPro  5.8<L>  /  Alb  2.4<L>  /  TBili  0.3  /  DBili  x   /  AST  24  /  ALT  11  /  AlkPhos  336<H>  09-29    LIVER FUNCTIONS - ( 29 Sep 2023 03:21 )  Alb: 2.4 g/dL / Pro: 5.8 g/dL / ALK PHOS: 336 U/L / ALT: 11 U/L / AST: 24 U/L / GGT: x           PTT - ( 29 Sep 2023 03:21 )  PTT:64.8 sec        Urinalysis Basic - ( 29 Sep 2023 03:21 )    Color: x / Appearance: x / SG: x / pH: x  Gluc: 263 mg/dL / Ketone: x  / Bili: x / Urobili: x   Blood: x / Protein: x / Nitrite: x   Leuk Esterase: x / RBC: x / WBC x   Sq Epi: x / Non Sq Epi: x / Bacteria: x      POCT Blood Glucose.: 306 mg/dL *H* (09-29-23 @ 06:05)  POCT Blood Glucose.: 271 mg/dL *H* (09-28-23 @ 23:28)  POCT Blood Glucose.: 216 mg/dL *H* (09-28-23 @ 17:21)  POCT Blood Glucose.: 312 mg/dL *H* (09-28-23 @ 11:30)        MEDICATIONS  (STANDING):  acetic acid 0.25% Topical Irrigation 1 Application(s) Topical two times a day  albuterol/ipratropium for Nebulization 3 milliLiter(s) Nebulizer every 6 hours  artificial tears (preservative free) Ophthalmic Solution 1 Drop(s) Right EYE every 2 hours  atorvastatin 40 milliGRAM(s) Oral at bedtime  buMETAnide Injectable 2 milliGRAM(s) IV Push <User Schedule>  chlorhexidine 0.12% Liquid 15 milliLiter(s) Oral Mucosa every 12 hours  chlorhexidine 2% Cloths 1 Application(s) Topical daily  chlorhexidine 4% Liquid 1 Application(s) Topical <User Schedule>  DAPTOmycin IVPB 500 milliGRAM(s) IV Intermittent every 48 hours  dextrose 5%. 1000 milliLiter(s) (100 mL/Hr) IV Continuous <Continuous>  dextrose 5%. 1000 milliLiter(s) (50 mL/Hr) IV Continuous <Continuous>  dextrose 50% Injectable 25 Gram(s) IV Push once  dextrose 50% Injectable 25 Gram(s) IV Push once  dextrose 50% Injectable 12.5 Gram(s) IV Push once  epoetin odalis (EPOGEN) Injectable 84239 Unit(s) SubCutaneous <User Schedule>  erythromycin   Ointment 1 Application(s) Left EYE four times a day  ferrous    sulfate Liquid 300 milliGRAM(s) Oral daily  glucagon  Injectable 1 milliGRAM(s) IntraMuscular once  heparin  Infusion 1050 Unit(s)/Hr (10 mL/Hr) IV Continuous <Continuous>  hydrocortisone sodium succinate Injectable 50 milliGRAM(s) IV Push every 8 hours  insulin lispro (ADMELOG) corrective regimen sliding scale   SubCutaneous every 6 hours  insulin NPH human recombinant 11 Unit(s) SubCutaneous every 6 hours  midodrine. 20 milliGRAM(s) Oral every 8 hours  mupirocin 2% Ointment 1 Application(s) Topical two times a day  ofloxacin 0.3% Solution 1 Drop(s) Left EYE every 2 hours  pantoprazole  Injectable 40 milliGRAM(s) IV Push two times a day  petrolatum Ophthalmic Ointment 1 Application(s) Right EYE two times a day  psyllium Powder 1 Packet(s) Oral daily  sodium chloride 2 Gram(s) Oral two times a day    MEDICATIONS  (PRN):  acetaminophen   Oral Liquid .. 650 milliGRAM(s) Oral every 6 hours PRN Temp greater or equal to 38C (100.4F), Mild Pain (1 - 3)  artificial tears (preservative free) Ophthalmic Solution 1 Drop(s) Both EYES three times a day PRN Dry Eyes  calamine/zinc oxide Lotion 1 Application(s) Topical two times a day PRN Rash and/or Itching  dextrose Oral Gel 15 Gram(s) Oral once PRN Blood Glucose LESS THAN 70 milliGRAM(s)/deciliter  sodium chloride 0.9% lock flush 10 milliLiter(s) IV Push every 1 hour PRN Pre/post blood products, medications, blood draw, and to maintain line patency      Allergies:  isoniazid (Rash)  nafcillin (Unknown)  hydrALAZINE (Rash)  vitamin E (Short breath; Urticaria; Hives)  doxycycline (Rash)  cefepime (Rash)  NIFEdipine (Urticaria; Hives)

## 2023-09-29 NOTE — PROGRESS NOTE ADULT - SUBJECTIVE AND OBJECTIVE BOX
Patient is a 73y old  Female who presents with a chief complaint of Respiratory distress (29 Sep 2023 14:38)      SUBJECTIVE / OVERNIGHT EVENTS: ptn is septic, meropenem resumed, s/p HD 9/28, next HD tomorrow    MEDICATIONS  (STANDING):  acetic acid 0.25% Topical Irrigation 1 Application(s) Topical two times a day  albuterol/ipratropium for Nebulization 3 milliLiter(s) Nebulizer every 6 hours  artificial tears (preservative free) Ophthalmic Solution 1 Drop(s) Right EYE every 2 hours  aspirin  chewable 81 milliGRAM(s) Enteral Tube daily  atorvastatin 40 milliGRAM(s) Oral at bedtime  buMETAnide Injectable 2 milliGRAM(s) IV Push <User Schedule>  chlorhexidine 0.12% Liquid 15 milliLiter(s) Oral Mucosa every 12 hours  chlorhexidine 2% Cloths 1 Application(s) Topical daily  chlorhexidine 4% Liquid 1 Application(s) Topical <User Schedule>  DAPTOmycin IVPB 500 milliGRAM(s) IV Intermittent every 48 hours  dextrose 5%. 1000 milliLiter(s) (100 mL/Hr) IV Continuous <Continuous>  dextrose 5%. 1000 milliLiter(s) (50 mL/Hr) IV Continuous <Continuous>  dextrose 50% Injectable 12.5 Gram(s) IV Push once  dextrose 50% Injectable 25 Gram(s) IV Push once  dextrose 50% Injectable 25 Gram(s) IV Push once  epoetin odalis (EPOGEN) Injectable 81987 Unit(s) SubCutaneous <User Schedule>  erythromycin   Ointment 1 Application(s) Left EYE four times a day  ferrous    sulfate Liquid 300 milliGRAM(s) Oral daily  glucagon  Injectable 1 milliGRAM(s) IntraMuscular once  heparin  Infusion 1050 Unit(s)/Hr (10 mL/Hr) IV Continuous <Continuous>  hydrocortisone sodium succinate Injectable 25 milliGRAM(s) IV Push every 8 hours  insulin lispro (ADMELOG) corrective regimen sliding scale   SubCutaneous every 6 hours  insulin NPH human recombinant 11 Unit(s) SubCutaneous every 6 hours  midodrine. 20 milliGRAM(s) Oral every 8 hours  mupirocin 2% Ointment 1 Application(s) Topical two times a day  ofloxacin 0.3% Solution 1 Drop(s) Left EYE every 2 hours  pantoprazole  Injectable 40 milliGRAM(s) IV Push two times a day  petrolatum Ophthalmic Ointment 1 Application(s) Right EYE two times a day  psyllium Powder 1 Packet(s) Oral daily  sodium chloride 2 Gram(s) Oral two times a day  valACYclovir 500 milliGRAM(s) Oral every 24 hours    MEDICATIONS  (PRN):  acetaminophen   Oral Liquid .. 650 milliGRAM(s) Oral every 6 hours PRN Temp greater or equal to 38C (100.4F), Mild Pain (1 - 3)  artificial tears (preservative free) Ophthalmic Solution 1 Drop(s) Both EYES three times a day PRN Dry Eyes  calamine/zinc oxide Lotion 1 Application(s) Topical two times a day PRN Rash and/or Itching  dextrose Oral Gel 15 Gram(s) Oral once PRN Blood Glucose LESS THAN 70 milliGRAM(s)/deciliter  sodium chloride 0.9% lock flush 10 milliLiter(s) IV Push every 1 hour PRN Pre/post blood products, medications, blood draw, and to maintain line patency      Vital Signs Last 24 Hrs  T(F): 99 (09-29-23 @ 12:00), Max: 101.1 (09-29-23 @ 04:00)  HR: 114 (09-29-23 @ 17:00) (78 - 118)  BP: 112/41 (09-29-23 @ 17:00) (93/74 - 128/56)  RR: 22 (09-29-23 @ 17:00) (18 - 26)  SpO2: 93% (09-29-23 @ 17:00) (93% - 100%)  Telemetry:   CAPILLARY BLOOD GLUCOSE      POCT Blood Glucose.: 251 mg/dL (29 Sep 2023 17:23)  POCT Blood Glucose.: 272 mg/dL (29 Sep 2023 12:05)  POCT Blood Glucose.: 306 mg/dL (29 Sep 2023 06:05)  POCT Blood Glucose.: 271 mg/dL (28 Sep 2023 23:28)    I&O's Summary    28 Sep 2023 07:01  -  29 Sep 2023 07:00  --------------------------------------------------------  IN: 2142.5 mL / OUT: 2425 mL / NET: -282.5 mL    29 Sep 2023 07:01  -  29 Sep 2023 17:59  --------------------------------------------------------  IN: 500 mL / OUT: 30 mL / NET: 470 mL        PHYSICAL EXAM:  GENERAL: NAD, well-developed  HEAD:  Atraumatic, Normocephalic  EYES: EOMI, PERRLA, conjunctiva and sclera clear  NECK: Supple, No JVD  CHEST/LUNG: Clear to auscultation bilaterally; No wheeze  HEART: Regular rate and rhythm; No murmurs, rubs, or gallops  ABDOMEN: Soft, Nontender, Nondistended; Bowel sounds present  EXTREMITIES:  2+ Peripheral Pulses, No clubbing, cyanosis, or edema  PSYCH: AAOx3  NEUROLOGY: non-focal  SKIN: No rashes or lesions    LABS:                        7.4    8.30  )-----------( 303      ( 29 Sep 2023 03:21 )             23.0     09-29    129<L>  |  91<L>  |  55<H>  ----------------------------<  263<H>  3.6   |  25  |  1.78<H>    Ca    9.1      29 Sep 2023 03:21  Phos  2.8     09-29  Mg     2.20     09-29    TPro  5.8<L>  /  Alb  2.4<L>  /  TBili  0.3  /  DBili  x   /  AST  24  /  ALT  11  /  AlkPhos  336<H>  09-29    PTT - ( 29 Sep 2023 09:10 )  PTT:62.3 sec      Urinalysis Basic - ( 29 Sep 2023 03:21 )    Color: x / Appearance: x / SG: x / pH: x  Gluc: 263 mg/dL / Ketone: x  / Bili: x / Urobili: x   Blood: x / Protein: x / Nitrite: x   Leuk Esterase: x / RBC: x / WBC x   Sq Epi: x / Non Sq Epi: x / Bacteria: x        RADIOLOGY & ADDITIONAL TESTS:    Imaging Personally Reviewed:    Consultant(s) Notes Reviewed:      Care Discussed with Consultants/Other Providers:

## 2023-09-29 NOTE — PROGRESS NOTE ADULT - SUBJECTIVE AND OBJECTIVE BOX
[facility] DEPARTMENT OF OPHTHALMOLOGY  ------------------------------------------------------------------------------  Latricia France MD, PGY-3  Contact: TEAMS  ------------------------------------------------------------------------------    Interval History: No acute events overnight. Today patient denying blurred vision, eye pain, flashes, floaters, FBS, erythema, or discharge.     MEDICATIONS  (STANDING):  acetic acid 0.25% Topical Irrigation 1 Application(s) Topical two times a day  albuterol/ipratropium for Nebulization 3 milliLiter(s) Nebulizer every 6 hours  artificial tears (preservative free) Ophthalmic Solution 1 Drop(s) Right EYE every 2 hours  atorvastatin 40 milliGRAM(s) Oral at bedtime  buMETAnide Injectable 2 milliGRAM(s) IV Push <User Schedule>  chlorhexidine 0.12% Liquid 15 milliLiter(s) Oral Mucosa every 12 hours  chlorhexidine 2% Cloths 1 Application(s) Topical daily  chlorhexidine 4% Liquid 1 Application(s) Topical <User Schedule>  DAPTOmycin IVPB 500 milliGRAM(s) IV Intermittent every 48 hours  dextrose 5%. 1000 milliLiter(s) (100 mL/Hr) IV Continuous <Continuous>  dextrose 5%. 1000 milliLiter(s) (50 mL/Hr) IV Continuous <Continuous>  dextrose 50% Injectable 12.5 Gram(s) IV Push once  dextrose 50% Injectable 25 Gram(s) IV Push once  dextrose 50% Injectable 25 Gram(s) IV Push once  epoetin odalis (EPOGEN) Injectable 54504 Unit(s) SubCutaneous <User Schedule>  erythromycin   Ointment 1 Application(s) Left EYE four times a day  ferrous    sulfate Liquid 300 milliGRAM(s) Oral daily  glucagon  Injectable 1 milliGRAM(s) IntraMuscular once  heparin  Infusion 1050 Unit(s)/Hr (10 mL/Hr) IV Continuous <Continuous>  hydrocortisone sodium succinate Injectable 50 milliGRAM(s) IV Push every 8 hours  insulin lispro (ADMELOG) corrective regimen sliding scale   SubCutaneous every 6 hours  insulin NPH human recombinant 11 Unit(s) SubCutaneous every 6 hours  midodrine. 20 milliGRAM(s) Oral every 8 hours  mupirocin 2% Ointment 1 Application(s) Topical two times a day  ofloxacin 0.3% Solution 1 Drop(s) Left EYE every 2 hours  pantoprazole  Injectable 40 milliGRAM(s) IV Push two times a day  petrolatum Ophthalmic Ointment 1 Application(s) Right EYE two times a day  potassium chloride   Powder 20 milliEquivalent(s) Enteral Tube once  psyllium Powder 1 Packet(s) Oral daily  sodium chloride 2 Gram(s) Oral two times a day    MEDICATIONS  (PRN):  acetaminophen   Oral Liquid .. 650 milliGRAM(s) Oral every 6 hours PRN Temp greater or equal to 38C (100.4F), Mild Pain (1 - 3)  artificial tears (preservative free) Ophthalmic Solution 1 Drop(s) Both EYES three times a day PRN Dry Eyes  calamine/zinc oxide Lotion 1 Application(s) Topical two times a day PRN Rash and/or Itching  dextrose Oral Gel 15 Gram(s) Oral once PRN Blood Glucose LESS THAN 70 milliGRAM(s)/deciliter  sodium chloride 0.9% lock flush 10 milliLiter(s) IV Push every 1 hour PRN Pre/post blood products, medications, blood draw, and to maintain line patency      VITALS: T(C): 38.4 (09-29-23 @ 04:00)  T(F): 101.1 (09-29-23 @ 04:00), Max: 101.1 (09-29-23 @ 04:00)  HR: 110 (09-29-23 @ 08:00) (78 - 118)  BP: 106/46 (09-29-23 @ 08:00) (84/62 - 128/35)  RR:  (18 - 26)  SpO2:  (93% - 100%)  Wt(kg): --  General: AAO x 0, appropriate mood and affect    KENDY VA, EOM, color plates, CVF 2/2 patient status.    Hand held Slit Lamp Exam:  External: Flat OU  Lids/Lashes/Lacrimal Ducts: Flat OU    Sclera/Conjunctiva: W+Q OD. Conjunctivochalasis OU. Subconjunctival hemorrhage and injection OS. 2+ chemosis OU.  Cornea: Cl OD. D-folds centrally OS. Delle at 3 o'clock OS. Dense SPK OU. No epi defects OU.  Anterior Chamber: D+F OU. No hypopyon OS.   Iris: Flat OU  Lens: Cl OU     [facility] DEPARTMENT OF OPHTHALMOLOGY  ------------------------------------------------------------------------------  Latricia France MD, PGY-3  Contact: TEAMS  ------------------------------------------------------------------------------    Interval History: No acute events overnight. Today patient denying blurred vision, eye pain, flashes, floaters, FBS, erythema, or discharge.     MEDICATIONS  (STANDING):  acetic acid 0.25% Topical Irrigation 1 Application(s) Topical two times a day  albuterol/ipratropium for Nebulization 3 milliLiter(s) Nebulizer every 6 hours  artificial tears (preservative free) Ophthalmic Solution 1 Drop(s) Right EYE every 2 hours  atorvastatin 40 milliGRAM(s) Oral at bedtime  buMETAnide Injectable 2 milliGRAM(s) IV Push <User Schedule>  chlorhexidine 0.12% Liquid 15 milliLiter(s) Oral Mucosa every 12 hours  chlorhexidine 2% Cloths 1 Application(s) Topical daily  chlorhexidine 4% Liquid 1 Application(s) Topical <User Schedule>  DAPTOmycin IVPB 500 milliGRAM(s) IV Intermittent every 48 hours  dextrose 5%. 1000 milliLiter(s) (100 mL/Hr) IV Continuous <Continuous>  dextrose 5%. 1000 milliLiter(s) (50 mL/Hr) IV Continuous <Continuous>  dextrose 50% Injectable 12.5 Gram(s) IV Push once  dextrose 50% Injectable 25 Gram(s) IV Push once  dextrose 50% Injectable 25 Gram(s) IV Push once  epoetin odalis (EPOGEN) Injectable 83503 Unit(s) SubCutaneous <User Schedule>  erythromycin   Ointment 1 Application(s) Left EYE four times a day  ferrous    sulfate Liquid 300 milliGRAM(s) Oral daily  glucagon  Injectable 1 milliGRAM(s) IntraMuscular once  heparin  Infusion 1050 Unit(s)/Hr (10 mL/Hr) IV Continuous <Continuous>  hydrocortisone sodium succinate Injectable 50 milliGRAM(s) IV Push every 8 hours  insulin lispro (ADMELOG) corrective regimen sliding scale   SubCutaneous every 6 hours  insulin NPH human recombinant 11 Unit(s) SubCutaneous every 6 hours  midodrine. 20 milliGRAM(s) Oral every 8 hours  mupirocin 2% Ointment 1 Application(s) Topical two times a day  ofloxacin 0.3% Solution 1 Drop(s) Left EYE every 2 hours  pantoprazole  Injectable 40 milliGRAM(s) IV Push two times a day  petrolatum Ophthalmic Ointment 1 Application(s) Right EYE two times a day  potassium chloride   Powder 20 milliEquivalent(s) Enteral Tube once  psyllium Powder 1 Packet(s) Oral daily  sodium chloride 2 Gram(s) Oral two times a day    MEDICATIONS  (PRN):  acetaminophen   Oral Liquid .. 650 milliGRAM(s) Oral every 6 hours PRN Temp greater or equal to 38C (100.4F), Mild Pain (1 - 3)  artificial tears (preservative free) Ophthalmic Solution 1 Drop(s) Both EYES three times a day PRN Dry Eyes  calamine/zinc oxide Lotion 1 Application(s) Topical two times a day PRN Rash and/or Itching  dextrose Oral Gel 15 Gram(s) Oral once PRN Blood Glucose LESS THAN 70 milliGRAM(s)/deciliter  sodium chloride 0.9% lock flush 10 milliLiter(s) IV Push every 1 hour PRN Pre/post blood products, medications, blood draw, and to maintain line patency      VITALS: T(C): 38.4 (09-29-23 @ 04:00)  T(F): 101.1 (09-29-23 @ 04:00), Max: 101.1 (09-29-23 @ 04:00)  HR: 110 (09-29-23 @ 08:00) (78 - 118)  BP: 106/46 (09-29-23 @ 08:00) (84/62 - 128/35)  RR:  (18 - 26)  SpO2:  (93% - 100%)  Wt(kg): --  General: AAO x 0, appropriate mood and affect    KENDY VA, EOM, color plates, CVF 2/2 patient status.    Hand held Slit Lamp Exam:  External: Flat OU  Lids/Lashes/Lacrimal Ducts: Flat OU    Sclera/Conjunctiva: W+Q OD. Conjunctivochalasis OU. Subconjunctival hemorrhage and injection OS. 2+ chemosis OU.  Cornea: Cl OD. D-folds centrally OS. Delle at 3 o'clock OS improved. Dense SPK OU. No epi defects OU.  Anterior Chamber: D+F OU. No hypopyon OS.   Iris: Flat OU  Lens: Cl OU

## 2023-09-29 NOTE — PROGRESS NOTE ADULT - ASSESSMENT
74 y/o F well known to me from my [facility] [place] outpt practice. she was admitted at [facility] 7/12-7/22 w aspiration PNA, was treated w CEFEPIME, developed an allergic rash,  dCHF, + MAC on AFB culture, had been progressively getting more and more lethargic and dyspneic at home since DC.   In  am of 8/11/23  ptn presented with respiratory distress w hypoxia and hypercarbia requiring intubation 2/2 volume overload +/- Asp PNA      Neuro   responds appropriately   Baseline MS AOx3, aphasic   - h/o CVA , on aspirin and statin . resumed w feeding tube, ASA resumed 8/26  - eeg  2/2 tremors, no sz focus  - less responsive in the past few days  - MRI 8/17:  new R cerebellar infarct, old left PCA/Occipital infarct. 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 Mcnulty, now in MICU  s/p septic shock overnight 9/1, now on meropenem, HDS  s/p trache, on vent support, copious secretions      Renal     Ptn well know to Dr. Colon,following   HD 8/19,  8/21, 8/26 and 8/28.  s/p PUF 8/29 2.5 Liters, HD 8/30,  9/1, 9/4  L IJ HD placed  uremic  hyponatremic  HD as per renal        Hypotension  - Levo drip  prognosis is poor  consider palliative care consult    GI  NPO  on tube feeds  coffee ground emesis x 1 8/24, melena overnight 8/31, s/p 1UPRBC, EGD/Colonoscopy: erosive gastropathy, esophagisits, on colonoscopy old blood seen no active bleeding, poor prep  family to decide re PEG placement    Endocrine  T2DM   A1C 7.1 in 7/2023   - BG goal 140-200     ID   No fever/leukocytosis, recheck temp   Hx latent TB which was treated, no concern for TB   - grew MAC on AFB culture from most recent admission. at [facility]  -MSSA Bacteremia 9/1, allergic to cephalosprins and PCN, on Vanco as per ID, renal dosing,   - sputum also grew E.Coli-ESBL, as per ID recs for  Bradford through 9/7( 1 week course as per ID) , afebrile.  - 9/8:  spike  temp 38.1C, has O. externa, has a wick, recs are to get a CT to R/O an abscess/bony involvement. cont Meropenem  9/9: ptn w fever overnight to 100.8,  may need shiley pulled if bacteremic, needs NECK CT as per ENT to R/O Mastoid bone involvement while having ongoing purulent DC from L ear 2/2 O. externa, getting daily wick changes  9/10:  spiked 102 overnight through Bradford and Vanco, purulent DC from O. externa, ENT recommend Ct of mastoid. ptn in HD, has Shiley in place, consider pulling shiley and send for Cx. would d/w Renal, and consider contacting  ID, last seen by Dr. Conner 9/6 9/11: remains febrile, Dr. Conner reconsulted. cont Bradford, Vanco  9/12: tmax 100.5, fever curve is improving, awaiting Left ear CT, on Bradford since 9/1. on  vanco for MSSA Bacteremia, does not tolerate cephalosporins. through 10/2  9/13: on full vent support via trache, appears uncomfortable and onur 2/2 Left O. externa. has an incidental left middle ear tumor, no acute middle ear interventions recommended, left ear wick replaced. on Meropenem, cx from Ear DC is neg. On Vanco for MSSA bacteremia through 10/2, course of Meropenem as per ID, afebrile in the past 24 hrs . Cardura for HTN resumed, HGB dropped to 6.4, got a dose of EPO and 1UPRBC, rpt 7.8, remains on HEPARIN drip for LEFT popliteal DVT  9/14: cont on Mupirocin locally to Left ear, cont IV Bradford, ENT signed off, afebrile x 48 hrs, Mro course to be determined by ID, on Vanco for MSSA bacteremia through 10/2. ongoing worsening hyponatremia, Hypertonic saline as per renal, no HD today, s/p 1UPRBC 9/13  9/15: spiking temps again, if cont to spike as per ID re PAN scan. cont Vanco for MSSA Bacteremia  9/16-18: no temp overnight  afebrile, cont to have Left ear DC,   on Vanco and ,bradford through 10/2  On IV Fluconazole for fungal cx+ from O. externa Discharge.   fluconazole started 9/21, ptn developed an allergic rash on 9/22. doubt its 2/2 to MEROPENEM or Vanco.  MEropenem was DCed 2/2 causing possible drug rash.  on VANCO based on levels for MSSA bacteremia but DCed 2/2 poss drug rash, now on Daptomycin  9/29: ptn is septic, meropenem resumed, s/p HD 9/28, next HD tomorrow    Heme/Onc  ppx: place on SCD  Transfuse for hgb 6.4, no obv bleed. HDS  LEFT POPLITEAL VEIN ACUTE DVT on 9/10, on Heparin drip    Ethics  GOC - Discussed GOC with daughter and , they have opted in the past for full code. and she remains full code at present      9/27:  In [place], R IJ HD catheter placed, NGT in place, acidotic on VBG, trache to vent, anasarca, on IV BUMEX, on Levo, on Heparin drip, on stress dose HYdrocortisone, FS in range, uremic, awaiting HD, CT C/A/P w no acute findings. VANCO Dced , now on Dapto, for MSSA baceteremia through 10/2    9/28: HD started 9/27, still encephalopathic, fluconazole DCed as it could be the cause of the rash. on Dapto to complete a course of Abx for MSSA bacteremia through 10/2, VANCO DCed 2/2 possible rash. off pressors

## 2023-09-29 NOTE — PROGRESS NOTE ADULT - SUBJECTIVE AND OBJECTIVE BOX
Subjective: Patient seen and examined. No new events except as noted.   remains in ICU       REVIEW OF SYSTEMS:  Unable to obtain     MEDICATIONS:  MEDICATIONS  (STANDING):  acetic acid 0.25% Topical Irrigation 1 Application(s) Topical two times a day  albuterol/ipratropium for Nebulization 3 milliLiter(s) Nebulizer every 6 hours  artificial tears (preservative free) Ophthalmic Solution 1 Drop(s) Right EYE every 2 hours  aspirin  chewable 81 milliGRAM(s) Enteral Tube daily  atorvastatin 40 milliGRAM(s) Oral at bedtime  buMETAnide Injectable 2 milliGRAM(s) IV Push <User Schedule>  chlorhexidine 0.12% Liquid 15 milliLiter(s) Oral Mucosa every 12 hours  chlorhexidine 2% Cloths 1 Application(s) Topical daily  chlorhexidine 4% Liquid 1 Application(s) Topical <User Schedule>  DAPTOmycin IVPB 500 milliGRAM(s) IV Intermittent every 48 hours  dextrose 5%. 1000 milliLiter(s) (50 mL/Hr) IV Continuous <Continuous>  dextrose 5%. 1000 milliLiter(s) (100 mL/Hr) IV Continuous <Continuous>  dextrose 50% Injectable 12.5 Gram(s) IV Push once  dextrose 50% Injectable 25 Gram(s) IV Push once  dextrose 50% Injectable 25 Gram(s) IV Push once  epoetin odalis (EPOGEN) Injectable 76118 Unit(s) SubCutaneous <User Schedule>  erythromycin   Ointment 1 Application(s) Left EYE four times a day  ferrous    sulfate Liquid 300 milliGRAM(s) Oral daily  glucagon  Injectable 1 milliGRAM(s) IntraMuscular once  heparin  Infusion 1050 Unit(s)/Hr (10 mL/Hr) IV Continuous <Continuous>  hydrocortisone sodium succinate Injectable 25 milliGRAM(s) IV Push every 8 hours  insulin lispro (ADMELOG) corrective regimen sliding scale   SubCutaneous every 6 hours  insulin NPH human recombinant 11 Unit(s) SubCutaneous every 6 hours  meropenem  IVPB 500 milliGRAM(s) IV Intermittent every 12 hours  midodrine. 20 milliGRAM(s) Oral every 8 hours  mupirocin 2% Ointment 1 Application(s) Topical two times a day  ofloxacin 0.3% Solution 1 Drop(s) Left EYE every 2 hours  pantoprazole  Injectable 40 milliGRAM(s) IV Push two times a day  petrolatum Ophthalmic Ointment 1 Application(s) Right EYE two times a day  potassium chloride   Powder 20 milliEquivalent(s) Enteral Tube once  psyllium Powder 1 Packet(s) Oral daily  sodium chloride 2 Gram(s) Oral two times a day  valACYclovir 500 milliGRAM(s) Oral three times a day      PHYSICAL EXAM:  T(C): 38.1 (09-29-23 @ 08:00), Max: 38.4 (09-29-23 @ 04:00)  HR: 105 (09-29-23 @ 11:00) (78 - 118)  BP: 113/68 (09-29-23 @ 11:00) (84/62 - 124/30)  RR: 22 (09-29-23 @ 11:00) (18 - 26)  SpO2: 97% (09-29-23 @ 11:00) (93% - 100%)  Wt(kg): --  I&O's Summary    28 Sep 2023 07:01  -  29 Sep 2023 07:00  --------------------------------------------------------  IN: 2142.5 mL / OUT: 2425 mL / NET: -282.5 mL    29 Sep 2023 07:01  -  29 Sep 2023 11:00  --------------------------------------------------------  IN: 200 mL / OUT: 30 mL / NET: 170 mL          Appearance: NAD, +trach  HEENT: dry oral mucosa  Lymphatic: No lymphadenopathy  Cardiovascular: Normal S1 S2, No JVD, No murmurs, No edema  Respiratory: Decreased BS, + trach to vent	  Neuro: opens eyes  Gastrointestinal: Soft, Non-tender, + BS, + NGT  Skin: No rashes, No ecchymoses, No cyanosis	  Extremities: No strength/ROM 2/2 sedation, + BL LE edema  Vascular: Peripheral pulses palpable 2+ bilaterally  Mode: AC/ CMV (Assist Control/ Continuous Mandatory Ventilation), RR (machine): 12, TV (machine): 360, FiO2: 30, PEEP: 5, ITime: 0.74, MAP: 8, PIP: 29      LABS:    CARDIAC MARKERS:  CARDIAC MARKERS ( 27 Sep 2023 02:50 )  x     / x     / 46 U/L / x     / x                                    7.4    8.30  )-----------( 303      ( 29 Sep 2023 03:21 )             23.0     09-29    129<L>  |  91<L>  |  55<H>  ----------------------------<  263<H>  3.6   |  25  |  1.78<H>    Ca    9.1      29 Sep 2023 03:21  Phos  2.8     09-29  Mg     2.20     09-29    TPro  5.8<L>  /  Alb  2.4<L>  /  TBili  0.3  /  DBili  x   /  AST  24  /  ALT  11  /  AlkPhos  336<H>  09-29    proBNP:   Lipid Profile:   HgA1c:   TSH: Thyroid Stimulating Hormone, Serum: 0.71 uIU/mL (09-29 @ 03:21)      Negative          TELEMETRY: 	    ECG:  	  RADIOLOGY:   DIAGNOSTIC TESTING:  [ ] Echocardiogram:  [ ]  Catheterization:  [ ] Stress Test:    OTHER:

## 2023-09-29 NOTE — PROGRESS NOTE ADULT - ASSESSMENT
Assessment and Recommendations:  73y female with a past medical history/ocular history of DM, HTN, CKD, breast CA, respiratory arrest and cardiac arrest (2018), CVA with residual weakness, aspiration PNA h/o trache, PEG, Diabetic retinopathy, consulted for red left eye, found to have left sided injection, chemosis, and descemet membrane folds concerning for early infection vs. HSV.     #Chemosis OD  # Hemorraghic Chemosis OS  # Delle OS  # POOJA OU  - Unable to test patient's visual acuity, color vision, EOM, due to current status  - Anterior exam OS with subconj heme, chemosis, and diffuse d-folds  - Significant SPK OU  - DDx includes early endophthalmitis vs. HSV  - Continue Ofloxacin 1 drop left eye 6 times a day (ordered by ophtho)  - Start erythromycin ointment 4 times a day in the left eye (ordered by ophtho)  - PLEASE TAPE LEFT EYE SHUT AT ALL TIMES (can remove tape when administering eye drops)  - Start preservative free artificial tears 4 times a day in the right eye (ordered by ophtho)  - Continue lacrilube ointment twice a day in the right eye   - Please start Valtrex 500 TID (if pt can tolerate per primary team) for coverage of possible HSV  - Ophtho will follow    Seen and discussed with Dr. Chau, Attending.     Outpatient Follow-up: Patient should follow-up with his/her ophthalmologist or with [facility] [place] Department of Ophthalmology within 1 week of after discharge at:    [address]  [place] [address]  [phone]    Latricia France MD, PGY-3  Contact: [employer]     Assessment and Recommendations:  73y female with a past medical history/ocular history of DM, HTN, CKD, breast CA, respiratory arrest and cardiac arrest (2018), CVA with residual weakness, aspiration PNA h/o trache, PEG, Diabetic retinopathy, consulted for red left eye, found to have left sided injection, chemosis, and descemet membrane folds concerning for early infection vs. HSV.     #Chemosis OD  # Hemorraghic Chemosis OS  # Delle OS  # POOJA OU  - Unable to test patient's visual acuity, color vision, EOM, due to current status  - Anterior exam OS with subconj heme, chemosis, and diffuse d-folds  - Significant SPK OU  - Delle OS appears improved today with less pooling of fluorescein   - DDx includes early endophthalmitis vs. HSV  - Continue Ofloxacin 1 drop left eye 6 times a day (ordered by ophtho)  - Continue erythromycin ointment 4 times a day in the left eye (ordered by ophtho)  - PLEASE TAPE LEFT EYE SHUT AT ALL TIMES (can remove tape when administering eye drops)  - Continue preservative free artificial tears 4 times a day in the right eye (ordered by ophtho)  - Continue lacrilube ointment twice a day in the right eye   - Please start Valtrex 500 TID (if pt can tolerate per primary team) for coverage of possible HSV  - Ophtho will follow  - Discussed findings with primary team     Discussed with Dr. Morales, Attending.     Outpatient Follow-up: Patient should follow-up with his/her ophthalmologist or with [facility] Department of Ophthalmology within 1 week of after discharge at:    [address]  [place] [address]  [phone]    Latricia France MD, PGY-3  Contact: [employer]

## 2023-09-29 NOTE — PROGRESS NOTE ADULT - ASSESSMENT
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 family declined renal biopsy started on prednisone  s/p trach 9/1 and BAL with MSSA   ET cx with ESBL and MSSA  started on ana cristina 9/1  blood cx 9/1: MSSA   repeat negative 9/4, 9/8  CXR with b/l opacities, R increased  L ear edema and otitis externa, the last cx showed candida and ENT stated it could be fungal (saw black spores?)    initial  resp failure for ?fluid ovrer load but also had MSSA in the sputum, got vanco, aztreonam one day then cefepime and had rash, renal failure which was considered due to cefepime and then received 2 days of cefazolin and then off, now with trach and bronc on 9/1 with MSSA bacteremia and BAL also MSSA  another ET cx with MSSA and ESBL E-coli  hypotension, MSSA bacteremia likely due to pneumonia, repeat blood cx negative 9/4, TTE limited unable to exclude endocarditis  L otitis externa on ana cristina since 9/1 but worsening edema and black discoloration with bullae forming and persistent fever (ear cx was negative)  Ct showed acute on chronic otitis externa and otomastoiditis , superimposed cholesteatoma can not be excluded, no malignant otitis externa  pt again febrile, ENT stated there was black spores which could be a fungal infection and the last cx showed candida albicans s/p 7 days of fluconazole   new erythematous patchy rash, did not improved after discontinuing the ana cristina so vanco switched to dapto but still with rash and it also started after pt was started on fluconazole so not sure which caused it  also hypotensive now and ?uveitis vs endophthalmitis, head CT with acute/subacute ischemia and old occipital infarct, chest/abd CT with unchanged  RUL consolidation, decreased BEVERLY consolidation  pt uremic as well, s/p shiley and resumed on HD 9/27  s/p bronc with excessive dynamic airway collapse s/p trach change and some improvement  pt febrile and tachy on 9/29  * repeat blood cx, u/a and urine cx, remove willard if no indication, check sputum cx  * add ana cristina again  * c/w dapto 500 q 48 (if plan is for HD, should be given after HD) the 4 week bacteremia course will end 10/2  * monitor CK  * monitor CBC/diff and renal function  * f/u with ophthalmology, they recommended valtrex as unclear eye pathology    The above assessment and plan was discussed with the primary team    Yumiko Conner MD  contact on teams  After 5pm and on weekends call [phone]

## 2023-09-29 NOTE — PROGRESS NOTE ADULT - ASSESSMENT
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 While in MICU patient noted with worsening renal failure requiring HD initiation, CGE/ Melena s/p EGD 8/31 found with esophagitis and erosive gastropathy, new right cerebellar infarct and fevers second to ESBL COLI and MSSA VAP, MSSA bacteremia, left ear otitis externa and drug rxn to cephalosporins Course further complicated by prolonged vent time s/p tracheostomy 9/1 and transferred to RCU 9/3 completed by recurrent fevers with high PIP, respiratory acidosis and concern for volume overloaded.   CTH repeated 9/26 for concern for encephalopathy - has known now - chronic bilateral cerebellar infarcts, L PCA infarct. L parietal hypodensity seen on prior CT likely artifactual    Impression:   Metabolic encephalopathy related to shock, infection, doubt new stroke  L PCA stroke, ESUS s/p ILR, also with bilateral centrum semiovale watershed infarcts   Multiple embolic strokes on MRI 8/17 - R cerebellum, midbrain, multiple other tiny embolic infarcts.   Dementia   MSSA bacteremia, r/o endocarditis - on Daptomycin  Acute popliteal DVT on hep gtt    Recommendations   [] New CTH from 9/26 without any evidence of acute infarct -> can consider repeat CTH down the line in mental status does not improve with treatment of underlying metabolic derangements and infections  []On ABx for MSSA bacteremia, possble HSV keratitis  [] repeat TTE or consider STEPHANIE to rule out endocarditis   [] GI following for coffee ground emesis - esophagitis seen on colonoscopy, monitor for bleeding now that on heparin gtt  [] family declined kidney biopsy for AIN -> s/p steroid tx   [] C/w home ASA 81 mg if no contraindications   [] hep gtt resumed, ok to target normal PTT, low suspicion for new ischmia   [] C/w home Atorvastatin 10 mg qhs   [] would interrogate ILR and review tele to see if pAF -. no AF seen  [] DVT/GI ppx - hep gtt   [] Neurochecks q4hr   [] BP goals: Normotension - on pressors  [] PT/OT when able   [] HgA1C goals < 7.0   [] continue to address GOC with family as pts  and daughter continue to remain hopeful    Katty Charles DO  Vascular Neurology  Office [phone]

## 2023-09-30 NOTE — PROGRESS NOTE ADULT - ASSESSMENT
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 While in MICU patient noted with worsening renal failure requiring HD initiation, CGE/ Melena s/p EGD 8/31 found with esophagitis and erosive gastropathy, new right cerebellar infarct and fevers second to ESBL COLI and MSSA VAP, MSSA bacteremia, left ear otitis externa and drug rxn to cephalosporins Course further complicated by prolonged vent time s/p tracheostomy 9/1 and transferred to RCU 9/3 completed by recurrent fevers with high PIP, respiratory acidosis and concern for volume overloaded.     NEUROLOGY  # Metabolic Encephalopath vs NEW cerebella infarct   - Baseline MS AOX3 with short term memory changes per family however noted with concern for poor mentation in ICU  - MRI (8/17) with NEW right cerebellar infarct and old left PCA/occipital infarcts  - STEPHANIE (8/17) with AV showing two linear mobile echogenic elements attached to valve leaflets consistent with Lambel's excrescence, but no TRINITY thrombus, and lambel's excrescence with uncertain clinical implication.   - EEG (8/11-8/15) with no seizures   - ILR interrogation (9/7) with SR and PACs falsely recorded as AF.   - Attempted to resume ASA for medical management but held given GIB   - Continue Lipitor   - Course complicated by questionable head and body shaking 9/8 prolactin elevated, however was shaking head yes/no to some questions.   - rCTH 9/26 for worsening encephalopathy-  Vague questionable areas of hypoattenuation within the bilateral cerebellar hemispheres which may be compatible with acute/subacute ischemia. Recommend further evaluation with a brain MRI study, provided there are no MRI contraindications. No acute intracranial hemorrhage. Previously seen questionable vague wedge-shaped area of hypoattenuation in the left parietal lobe with artifactual secondary to motion and volume averaging with a prominent sulcus. Chronic left occipital lobe infarct.  - neuro consulted- reccs: New CTH from 9/26 without any evidence of acute infarct -> can consider repeat CTH down the line in mental status does not improve with treatment of underlying metabolic derangements and infections.    CARDIOVASCULAR  # HTN Urgency   # Septic vs vasoplegic shock   - weaned off levophed on 9/28 at 7:55 am, requiredd 0.01 Levophed yesterday with HD,   -- repeat HD tomorrow __ monitor for stability,    - Holding Labetalol, Catapres , Cardura   - started stress does steroids (9/27 - Solu_Cortef 50 TID, and today  to 25 mg TID   -- cont Bumex for diuresis and consider stopping if no improvement in UOP   -- - last PRBC transfusion on 9/19 -- started ASA today    # Hx of HFpEF with likely ADHF with volume overload.   - ECHO (7/2023) with EF 59 with severe LVH and diastolic dysfunction   - STEPHANIE (8/18) with normal LVRVSF however noted with increased LA pressures and persistent LA septal bowing into RA.   - ECHO (8/11) with EF 60 with mild LVH, normal LVRVSF, and mild ddfxn.  - Remove volume with HD sessions and intermittent bumex pushes as BP allows    - cont Bumex 2mg IV q 6 hrs/s/p bumex gtt/w/ minimal improvement in UOP. -  plan for HD session tomorrow and monitor for UOP    HEENT   # Left ear OE   - ENT evaluation (9/7) with no mastoid tenderness, however found with positive swelling and excoriation to left auricle and tenderness to manipulation of auricle. Debrided crusting of auricle and EAC evaluated with edema and unable to visualize TM. EAC debrided and found with watery exudate.   - s/p CiproHC (9/5 - 9/16)   - Ana Cristina discontinued. Rash on torso concerning for possible drug rash  - ENT following and ear wick change QD   - Wick out but needs ? Debridement wound care called/fu ent   - ENT recommending CT IAC w/ IV con but family is refusing as concerned given CKD, kidneys wouldn't recover from IV contrast. Spoke with Nephrology, ENT, and patient's  at length. Planned for CT non con and per , decision will be made from there but for now doesn't want to risk further harming kidneys.  - CT IAC showing acute on chronic left-sided otitis externa and acute on chronic left-sided otomastoiditis. Superimposed left-sided tympanosclerosis. Superimposed cholesteatoma formation within the left tympanomastoid cavity cannot be excluded. No evidence of malignant otitis externa.  - ENT consulted (9/20) for white purulent discharge from left ear, recc: ENT: acetic acid drops BID to left ear. Mupirocin ointment to the left pinna BID, cover with xeroform after placing mupirocin ointment. Pressure offloading of the left ear.    # + L eye redness in setting of surrounding infectious process - Ophthalmology following, see in ID ,   -- cContinue Ofloxacin 1 drop left eye 6 times a day (ordered by ophtho)  - Continue erythromycin ointment 4 times a day in the left eye (ordered by ophtho)  - PLEASE TAPE LEFT EYE SHUT AT ALL TIMES (can remove tape when administering eye drops)  - Continue preservative free artificial tears 4 times a day in the right eye (ordered by ophtho)  - Continue lacrilube ointment twice a day in the right eye     # Oral Lesions with questionable Zoster vs Trauma?   - Patient with tongue pain and seen with anterior ulcerations consolidating and crusting and posterior ulceration.  - Case discussed with pathology resident and culture/ cytology sent.   - HSV negative and Path (9/6) with normal appearing epithelial cells singly and in clusters devoid of viral cytopathic effect.   - Continue on magic mouth wash for pain    RESPIRATORY  # Acute hypoxic/hypercapnic Respiratory failure likely iso volume overload   - Hx of CKD and HFpEF presented with SOB and hypercarbia second to volume overload requiring intubation and HD initiation   - Vent weaning attempted however limited requiring tracheostomy (9/1) with IP   - Course complicated by PIPs elevated (50s) and respiratory acidosis second to secretions vs volume ?    - Vent settings: 23/340/8/60%  - ABG - ( 28 Sep 2023 02:15 ) pH, Arterial: 7.27  pH, Blood: x     /  pCO2: 55    /  pO2: 53    / HCO3: 25    / Base Excess: -1.9  /  SaO2: 88.7    - POCUS (9/8) with BL pleural effusions (large on right and small on left)   - CT CHEST (9/8) with TBM, BL pleural effusions and continued consolidations   - (9/27) Over the past 24 hrs, pt with increasing O2 requirements and respiratory acidosis with poor TVs. Bedside bronchoscopy (+) severe dynamic airway collapse into distal end of tracheostomy extending to main ines and RM bronchus.  tracheostomy exchanged at bedside to size 6dXLT with repeat bronchoscopy showing some but not complete improvement in dynamic collapse.   - Continue volume removal , plan for repeat HD session tomorrow   - Continue on Duoneb for airway clearance        GI  # UGIB   - CGE x 1 episode on 8/24 and melena x 2 episodes on 8/31 likely residual blood.   - EGD (8/31) found with esophagitis and erosive gastropathy  - last PRBC transfusion on 9/19   - Continue on PPI BID through late October and then PPI QD   - No melena     # Dysphagia   - NGT-TF continued , changed to R nostril today given infections on L side  - Pending PEG however needs improvement in infectious status prior to placement.     RENAL  # Progressive CKD   - Presented volume overload and progressive RUSS on CKD (baseline CRE in 2s and mode into the 3s on admission) likely obstruction vs low flow state with shock vs AIN from drug reaction issue?  - POCUS with no signs of hydronephrosis   - s/p Pressor support started with improvement in renal function  - Attempted steroid course in ICU without improvement in renal function   - HD inititated in ICU for volume overload, held 9/20 iso malfunctioning shiley and stable labs/vol status   - iso worsening hypoxia/respiratory acidosis, L IJ shiley placed 9/27 and HD restarted. s/p 1.3L fluid removal 9/27.   -- HD on 9/27-- neg 1.3 L net and 9/28 with 1.8 L net neg, pt vided 195 over past 24 hrs, with 10-5 cc/ voidind per hr and UOP for LOS is + 2 L, and for 24 hrs is neg 686, plan for repeat HD session tomorrow.   - renal following  - Monitor renal function and UOP, and electrolytes   - Monitor urine output - willard placed on ICU admission, __ d/c willard today and perform TOV       Hyponatremia   - c/w Salt tabs - s/p Hypertonic 1.5%NS @ 50cc/hr x 5 hr  --as per nephrology, Hyponatremia - low but acceptable, on NaCl tabs + intermittent HD    # Hyperphosphatemia (resolved)   - PTH normal and elevated phos likely from renal failure  - s/p Phos binder with Renvela - now d'beth as level wnl      INFECTIOUS DISEASE  # ESBL ECOLI and MSSA VAP c/b MSSA bacteremia   - Course complicated by recurrent fevers, T max 101.1 today, last temp-- 100.5 on 9/20, WBC 8   - RVP/ COVID (8/11) negative   - SCx (8/11) with MSSA s/p cefepime (8/12-8/13) and then ancef (8/14) however noted with drug reaction and then changed to vanco and aztreonam (8/12-8/13) in ICU .   - SCx (9/1) with MSSA and ESBL ECOLI   - BAL (9/1) with MSSA   - BCx (9/1) with MSSA and cleared on (9/4), 9/26-- NGTD,   -- 9/26-- UA with WBC 2489, large leuks, U/Cx-- Candida,   + MSSA PCR on Mupirocin,   - ECHO (9/6) unable to rule out endocarditis   - LE DOPPLERS- Blood and sputum cx NTD; Acute left deep vein thrombosis of the left popliteal vein.  - s/p Ana Crisitna (9/4 - 9/22 ) and Vanco by level (last dose 9/22 ) , pt noted w/ new erythematous patchy rash, did not improve after discontinuing the ana cristina, so vanco switched to changed to Daptomycin per ID recs (9/26-), monitor CK's  - s/p fluconazole 200 qd (9/219/28) Dc'd per ID recs given family states rash appeared post fluconazole initiation  - BCx 9/26- NTD, Urine Cx 9/26- NTD, MRSA pcr+ staph  -- recurrent F with 101.1 today __ repeat blood cxs sent, sputum cx sent  -- as per discussion with ID, added meropenem empirically (9/29 - ) and started on Valtrex (9/29- ) as per ophthalmology recs (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 Debrided crusting of auricle and EAC evaluated with edema and unable to visualize TM. EAC debrided and found with watery exudate.   - s/p CiproHC (9/5 - 9/16)     # MRSA PCRs   - MRSA PCR (8/11 and 8/14) negative. + MSSA PCR on Mupirocin,   - Next MRSA + PCR ordered for 10/8     HEME  # Anemia second to GIB vs renal disease   - Hold chemical DVT PPX given bleeding/ waxing and waning HH   - s/p multiple units of PRBCs (8/15, 8/21, 8/28, 8/31 and 9/8)   - Anemia panel with AOCD but with low %sat and ferrous sulfate added to optimize   - Monitor HH and discuss with renal for EPO sometime next week.   - Spoke with GI for clearance to start Heparin gtt for + DVT   - c/w Heparin gtt (9/21-), aPTT are therapeutic-- 59>64, pt's specific-- aPTT goal 60-85     Vascular  # DVT  - Pt with + popliteal DVT on SCD Spoke with GI no absolute contraindication for starting Heparin - advise close monitoring for bleeding - Do stat CTA if bleeding occurs and call IR   - c/w Heparin gtt (9/21-)    ONC   # Hx of Breast CA   - Patient dx in 2018 and s/p BL mastectomy (radical on right) and chemo and RT.   - Supportive care.     ENDOCRINE  # IDDM2   - Monitor FS and adjust as needed   - started stress dose steroids 9/27, tepering down to 25 mg TID today, monitor FS -- 271>306>272  - -increased NPH to 11 U q 6 hrs, continue ISS     LINES/ TUBES  - R ARTHUR TAYLOR (8/19 - 9/21)   - L ARTHUR taylor (9/27 - )    SKIN  # Drug Eruption   - Attempted cefepime (8/12) vs ancef (8/13-8/14) and then noted with drug rxn.   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 While in MICU patient noted with worsening renal failure requiring HD initiation, CGE/ Melena s/p EGD 8/31 found with esophagitis and erosive gastropathy, new right cerebellar infarct and fevers second to ESBL COLI and MSSA VAP, MSSA bacteremia, left ear otitis externa and drug rxn to cephalosporins Course further complicated by prolonged vent time s/p tracheostomy 9/1 and transferred to RCU 9/3 completed by recurrent fevers with high PIP, respiratory acidosis and concern for volume overloaded.     NEUROLOGY  # Metabolic Encephalopath vs NEW cerebella infarct   - Baseline MS AOX3 with short term memory changes per family however noted with concern for poor mentation in ICU  - MRI (8/17) with NEW right cerebellar infarct and old left PCA/occipital infarcts;  - STEPHANIE (8/17) with AV showing two linear mobile echogenic elements attached to valve leaflets consistent with Lambel's excrescence, but no TRINITY thrombus, and lambel's excrescence with uncertain clinical implication.   - EEG (8/11-8/15) with no seizures   - ILR interrogation (9/7) with SR and PACs falsely recorded as AF.   - Attempted to resume ASA for medical management but held given GIB   - Continue Lipitor   - Course complicated by questionable head and body shaking 9/8 prolactin elevated, however was shaking head yes/no to some questions.   - rCTH 9/26 for worsening encephalopathy-  Vague questionable areas of hypoattenuation within the bilateral cerebellar hemispheres which may be compatible with acute/subacute ischemia. Recommend further evaluation with a brain MRI study, provided there are no MRI contraindications. No acute intracranial hemorrhage. Previously seen questionable vague wedge-shaped area of hypoattenuation in the left parietal lobe with artifactual secondary to motion and volume averaging with a prominent sulcus. Chronic left occipital lobe infarct.  - neuro consulted- reccs: New CTH from 9/26 without any evidence of acute infarct -> can consider repeat CTH down the line in mental status does not improve with treatment of underlying metabolic derangements and infections.  -- repeat TTE to rule out endocarditis __ pending   -- started on ASA 81 mg  __ bleeding resolved and pt has not been transfused since 9/19     CARDIOVASCULAR  # HTN Urgency   # Septic vs vasoplegic shock   - weaned off levophed on 9/28 at 7:55 am, required 0.01 Levophed yesterday with HD on 9/28,   -- repeat HD today --tolerated without any pressors     - Holding Labetalol, Catapres , Cardura   - started stress does steroids (9/27 - Solu_Cortef 50 TID, tapered to 25 mg TID (9/29)  -- discontinue Bumex, there is no improvement in UOP   -- - last PRBC transfusion on 9/19 -- started ASA on 9/29    # Hx of HFpEF with likely ADHF with volume overload.   - ECHO (7/2023) with EF 59 with severe LVH and diastolic dysfunction   - STEPHANIE (8/18) with normal LVRVSF however noted with increased LA pressures and persistent LA septal bowing into RA.   - ECHO (8/11) with EF 60 with mild LVH, normal LVRVSF, and mild ddfxn.  - Remove volume with HD sessions and intermittent bumex pushes as BP allows    - discontinue Bumex 2mg --  UOP is minimal  and cont HD sessions    HEENT   # Left ear OE   - ENT evaluation (9/7) with no mastoid tenderness, however found with positive swelling and excoriation to left auricle and tenderness to manipulation of auricle. Debrided crusting of auricle and EAC evaluated with edema and unable to visualize TM. EAC debrided and found with watery exudate.   - s/p CiproHC (9/5 - 9/16)   - Ana Cristina discontinued. Rash on torso concerning for possible drug rash  - ENT following and ear wick change QD   - Wick out but needs ? Debridement wound care called/fu ent   - ENT recommending CT IAC w/ IV con but family is refusing as concerned given CKD, kidneys wouldn't recover from IV contrast. Spoke with Nephrology, ENT, and patient's  at length. Planned for CT non con and per , decision will be made from there but for now doesn't want to risk further harming kidneys.  - CT IAC showing acute on chronic left-sided otitis externa and acute on chronic left-sided otomastoiditis. Superimposed left-sided tympanosclerosis. Superimposed cholesteatoma formation within the left tympanomastoid cavity cannot be excluded. No evidence of malignant otitis externa.  - ENT consulted (9/20) for white purulent discharge from left ear, recc: ENT: acetic acid drops BID to left ear. Mupirocin ointment to the left pinna BID, cover with xeroform after placing mupirocin ointment. Pressure offloading of the left ear.    # + L eye redness in setting of surrounding infectious process - Ophthalmology following, see in ID ,   -- cContinue Ofloxacin 1 drop left eye 6 times a day (ordered by ophtho)  - Continue erythromycin ointment 4 times a day in the left eye (ordered by ophtho)  - PLEASE TAPE LEFT EYE SHUT AT ALL TIMES (can remove tape when administering eye drops)  - Continue preservative free artificial tears 4 times a day in the right eye (ordered by ophtho)  - Continue lacrilube ointment twice a day in the right eye     # Oral Lesions with questionable Zoster vs Trauma?   - Patient with tongue pain and seen with anterior ulcerations consolidating and crusting and posterior ulceration.  - Case discussed with pathology resident and culture/ cytology sent.   - HSV negative and Path (9/6) with normal appearing epithelial cells singly and in clusters devoid of viral cytopathic effect.   - Continue on magic mouth wash for pain    RESPIRATORY  # Acute hypoxic/hypercapnic Respiratory failure likely iso volume overload   - Hx of CKD and HFpEF presented with SOB and hypercarbia second to volume overload requiring intubation and HD initiation   - Vent weaning attempted however limited requiring tracheostomy (9/1) with IP   - Course complicated by PIPs elevated (50s) and respiratory acidosis second to secretions vs volume ?    - Vent settings: 23/340/8/60%  - ABG - ( 28 Sep 2023 02:15 ) pH, Arterial: 7.27  pH, Blood: x     /  pCO2: 55    /  pO2: 53    / HCO3: 25    / Base Excess: -1.9  /  SaO2: 88.7    - POCUS (9/8) with BL pleural effusions (large on right and small on left)   - CT CHEST (9/8) with TBM, BL pleural effusions and continued consolidations   - (9/27) Over the past 24 hrs, pt with increasing O2 requirements and respiratory acidosis with poor TVs. Bedside bronchoscopy (+) severe dynamic airway collapse into distal end of tracheostomy extending to main ines and RM bronchus.  tracheostomy exchanged at bedside to size 6dXLT with repeat bronchoscopy showing some but not complete improvement in dynamic collapse.   - Continue volume removal , plan for repeat HD session tomorrow   - Continue on Duoneb for airway clearance        GI  # UGIB   - CGE x 1 episode on 8/24 and melena x 2 episodes on 8/31 likely residual blood.   - EGD (8/31) found with esophagitis and erosive gastropathy  - last PRBC transfusion on 9/19   - Continue on PPI BID through late October and then PPI QD   - No melena     # Dysphagia   - NGT-TF continued , changed to R nostril today given infections on L side  - Pending PEG however needs improvement in infectious status prior to placement.     RENAL  # Progressive CKD   - Presented volume overload and progressive RUSS on CKD (baseline CRE in 2s and mode into the 3s on admission) likely obstruction vs low flow state with shock vs AIN from drug reaction issue?  - POCUS with no signs of hydronephrosis   - s/p Pressor support started with improvement in renal function  - Attempted steroid course in ICU without improvement in renal function   - HD inititated in ICU for volume overload, held 9/20 iso malfunctioning shiley and stable labs/vol status   - iso worsening hypoxia/respiratory acidosis, L IJ shiley placed 9/27 and HD restarted. s/p 1.3L fluid removal 9/27.   -- HD on 9/27-- neg 1.3 L net and 9/28 with 1.8 L net neg, pt vided 195 over past 24 hrs, with 10-5 cc/ voidind per hr and UOP for LOS is + 2 L, and for 24 hrs is neg 686, plan for repeat HD session tomorrow.   - renal following  - Monitor renal function and UOP, and electrolytes   - Monitor urine output - willard placed on ICU admission, __ d/c willard today and perform TOV       Hyponatremia   - c/w Salt tabs - s/p Hypertonic 1.5%NS @ 50cc/hr x 5 hr  --as per nephrology, Hyponatremia - low but acceptable, on NaCl tabs + intermittent HD    # Hyperphosphatemia (resolved)   - PTH normal and elevated phos likely from renal failure  - s/p Phos binder with Renvela - now d'beth as level wnl      INFECTIOUS DISEASE  # ESBL ECOLI and MSSA VAP c/b MSSA bacteremia   - Course complicated by recurrent fevers, T max 101.1 today, last temp-- 100.5 on 9/20, WBC 8   - RVP/ COVID (8/11) negative   - SCx (8/11) with MSSA s/p cefepime (8/12-8/13) and then ancef (8/14) however noted with drug reaction and then changed to vanco and aztreonam (8/12-8/13) in ICU .   - SCx (9/1) with MSSA and ESBL ECOLI   - BAL (9/1) with MSSA   - BCx (9/1) with MSSA and cleared on (9/4), 9/26-- NGTD,   -- 9/26-- UA with WBC 2489, large leuks, U/Cx-- Candida,   + MSSA PCR on Mupirocin,   - ECHO (9/6) unable to rule out endocarditis   - LE DOPPLERS- Blood and sputum cx NTD; Acute left deep vein thrombosis of the left popliteal vein.  - s/p Ana Cristina (9/4 - 9/22 ) and Vanco by level (last dose 9/22 ) , pt noted w/ new erythematous patchy rash, did not improve after discontinuing the ana cristina, so vanco switched to changed to Daptomycin per ID recs (9/26-), monitor CK's  - s/p fluconazole 200 qd (9/219/28) Dc'd per ID recs given family states rash appeared post fluconazole initiation  - BCx 9/26- NTD, Urine Cx 9/26- NTD, MRSA pcr+ staph  -- recurrent F with 101.1 today __ repeat blood cxs sent, sputum cx sent  -- as per discussion with ID, added meropenem empirically (9/29 - ) and started on Valtrex (9/29- ) as per ophthalmology recs (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 Debrided crusting of auricle and EAC evaluated with edema and unable to visualize TM. EAC debrided and found with watery exudate.   - s/p CiproHC (9/5 - 9/16)     # MRSA PCRs   - MRSA PCR (8/11 and 8/14) negative. + MSSA PCR on Mupirocin,   - Next MRSA + PCR ordered for 10/8     HEME  # Anemia second to GIB vs renal disease   - Hold chemical DVT PPX given bleeding/ waxing and waning HH   - s/p multiple units of PRBCs (8/15, 8/21, 8/28, 8/31 and 9/8)   - Anemia panel with AOCD but with low %sat and ferrous sulfate added to optimize   - Monitor HH and discuss with renal for EPO sometime next week.   - Spoke with GI for clearance to start Heparin gtt for + DVT   - c/w Heparin gtt (9/21-), aPTT are therapeutic-- 59>64, pt's specific-- aPTT goal 60-85     Vascular  # DVT  - Pt with + popliteal DVT on SCD Spoke with GI no absolute contraindication for starting Heparin - advise close monitoring for bleeding - Do stat CTA if bleeding occurs and call IR   - c/w Heparin gtt (9/21-)    ONC   # Hx of Breast CA   - Patient dx in 2018 and s/p BL mastectomy (radical on right) and chemo and RT.   - Supportive care.     ENDOCRINE  # IDDM2   - Monitor FS and adjust as needed   - started stress dose steroids 9/27, tepering down to 25 mg TID today, monitor FS -- 271>306>272  - -increased NPH to 11 U q 6 hrs, continue ISS     LINES/ TUBES  - R IJ SHIRENZO (8/19 - 9/21)   - L IJ ilene (9/27 - )    SKIN  # Drug Eruption   - Attempted cefepime (8/12) vs ancef (8/13-8/14) and then noted with drug rxn.   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 While in MICU patient noted with worsening renal failure requiring HD initiation, CGE/ Melena s/p EGD 8/31 found with esophagitis and erosive gastropathy, new right cerebellar infarct and fevers second to ESBL COLI and MSSA VAP, MSSA bacteremia, left ear otitis externa and drug rxn to cephalosporins Course further complicated by prolonged vent time s/p tracheostomy 9/1 and transferred to RCU 9/3 completed by recurrent fevers with high PIP, respiratory acidosis and concern for volume overloaded.     NEUROLOGY  # Metabolic Encephalopath vs NEW cerebella infarct   - Baseline MS AOX3 with short term memory changes per family however noted with concern for poor mentation in ICU  - MRI (8/17) with NEW right cerebellar infarct and old left PCA/occipital infarcts;  - STEPHANIE (8/17) with AV showing two linear mobile echogenic elements attached to valve leaflets consistent with Lambel's excrescence, but no TRINITY thrombus, and lambel's excrescence with uncertain clinical implication.   - EEG (8/11-8/15) with no seizures   - ILR interrogation (9/7) with SR and PACs falsely recorded as AF.   - Attempted to resume ASA for medical management but held given GIB   - Continue Lipitor   - Course complicated by questionable head and body shaking 9/8 prolactin elevated, however was shaking head yes/no to some questions.   - rCTH 9/26 for worsening encephalopathy-  Vague questionable areas of hypoattenuation within the bilateral cerebellar hemispheres which may be compatible with acute/subacute ischemia. Recommend further evaluation with a brain MRI study, provided there are no MRI contraindications. No acute intracranial hemorrhage. Previously seen questionable vague wedge-shaped area of hypoattenuation in the left parietal lobe with artifactual secondary to motion and volume averaging with a prominent sulcus. Chronic left occipital lobe infarct.  - neuro consulted- reccs: New CTH from 9/26 without any evidence of acute infarct -> can consider repeat CTH down the line in mental status does not improve with treatment of underlying metabolic derangements and infections.  -- ECHO (9/6) unable to rule out endocarditis, repeat TTE to evaluate to endocarditis   -- started on ASA 81 mg  __ bleeding resolved and pt has not been transfused since 9/19     CARDIOVASCULAR  # HTN Urgency   # Septic vs vasoplegic shock   - weaned off levophed on 9/28 at 7:55 am, required 0.01 Levophed yesterday with HD on 9/28,   -- repeat HD today --tolerated without any pressors     - Holding Labetalol, Catapres , Cardura   - started stress does steroids (9/27 - Solu_Cortef 50 TID, tapered to 25 mg TID (9/29)  -- discontinue Bumex, there is no improvement in UOP   -- - last PRBC transfusion on 9/19 -- started ASA on 9/29    # Hx of HFpEF with likely ADHF with volume overload.   - ECHO (7/2023) with EF 59 with severe LVH and diastolic dysfunction   - STEPHANIE (8/18) with normal LVRVSF however noted with increased LA pressures and persistent LA septal bowing into RA.   - ECHO (8/11) with EF 60 with mild LVH, normal LVRVSF, and mild ddfxn.  - Remove volume with HD sessions and intermittent bumex pushes as BP allows    - discontinue Bumex 2mg --  UOP is minimal  and cont HD sessions    HEENT   # Left ear OE   - ENT evaluation (9/7) with no mastoid tenderness, however found with positive swelling and excoriation to left auricle and tenderness to manipulation of auricle. Debrided crusting of auricle and EAC evaluated with edema and unable to visualize TM. EAC debrided and found with watery exudate.   - s/p CiproHC (9/5 - 9/16)   - Ana Cristina discontinued. Rash on torso concerning for possible drug rash  - ENT following and ear wick change QD, Wick was removed  - ENT recommending CT IAC w/ IV con but family is refusing as concerned given CKD, kidneys wouldn't recover from IV contrast. Spoke with Nephrology, ENT, and patient's  at length. Planned for CT non con and per , decision will be made from there but for now doesn't want to risk further harming kidneys.  - CT IAC showing acute on chronic left-sided otitis externa and acute on chronic left-sided otomastoiditis. Superimposed left-sided tympanosclerosis. Superimposed cholesteatoma formation within the left tympanomastoid cavity cannot be excluded. No evidence of malignant otitis externa.  - ENT consulted (9/20) for white purulent discharge from left ear, recc: ENT: acetic acid drops BID to left ear. Mupirocin ointment to the left pinna BID, cover with xeroform after placing mupirocin ointment. Pressure offloading of the left ear. No plan for any surgical intervention from ENT at this time, ENT will sign off    # + L eye redness in setting of surrounding infectious process - Ophthalmology following, see in ID ,   -- L eye __ possible endophthalmitis, possible HSV  -- Continue Ofloxacin 1 drop left eye 6 times a day (ordered by ophtho)  - Continue erythromycin ointment 4 times a day in the left eye (ordered by ophtho)  - PLEASE TAPE LEFT EYE SHUT AT ALL TIMES (can remove tape when administering eye drops)  - Continue preservative free artificial tears 4 times a day in the right eye (ordered by ophtho)  - Continue Lacrilube ointment twice a day in the right eye     # Oral Lesions with questionable Zoster vs Trauma?   - Patient with tongue pain and seen with anterior ulcerations consolidating and crusting and posterior ulceration.  - Case discussed with pathology resident and culture/ cytology sent.   - HSV negative and Path (9/6) with normal appearing epithelial cells singly and in clusters devoid of viral cytopathic effect.   - Continue on magic mouth wash for pain    RESPIRATORY  # Acute hypoxic/hypercapnic Respiratory failure likely iso volume overload   - Hx of CKD and HFpEF presented with SOB and hypercarbia second to volume overload requiring intubation and HD initiation   - Vent weaning attempted however limited requiring tracheostomy (9/1) with IP   - Course complicated by PIPs elevated (50s) and respiratory acidosis second to secretions vs volume ?    - Vent settings: 22/340/8/40%,   -- VBG 7.35/49. minimal secretions   - POCUS (9/8) with BL pleural effusions (large on right and small on left)   - CT CHEST (9/8) with TBM, BL pleural effusions and continued consolidations   - (9/27) Over the past 24 hrs, pt with increasing O2 requirements and respiratory acidosis with poor TVs. Bedside bronchoscopy (+) severe dynamic airway collapse into distal end of tracheostomy extending to main ines and RM bronchus.  tracheostomy exchanged at bedside to size 6dXLT with repeat bronchoscopy showing some but not complete improvement in dynamic collapse.   - Continue volume removal with HD   - Continue on Duoneb for airway clearance        GI  # UGIB   - CGE x 1 episode on 8/24 and melena x 2 episodes on 8/31 likely residual blood.   - EGD (8/31) found with esophagitis and erosive gastropathy  - last PRBC transfusion on 9/19   - Continue on PPI BID through late October and then PPI QD   - No melena     # Dysphagia   - NGT-TF continued, changed to R nostril today given infections on L side  - Pending PEG however needs improvement in infectious status prior to placement.   -- pt is tolerating TF, + BM on 9/28,   --restarted BR     RENAL  # Progressive CKD   - Presented volume overload and progressive RUSS on CKD (baseline CRE in 2s and mode into the 3s on admission) likely obstruction vs low flow state with shock vs AIN from drug reaction issue?  - POCUS with no signs of hydronephrosis   - s/p Pressor support started with improvement in renal function  - Attempted steroid course in ICU without improvement in renal function   - HD inititated in ICU for volume overload, held 9/20 iso malfunctioning shiley and stable labs/vol status   - iso worsening hypoxia/respiratory acidosis, L IJ shiley placed 9/27 and HD restarted. s/p 1.3L fluid removal 9/27.   -- HD on 9/27-- neg 1.3 L net and 9/28 with 1.8 L net neg, pt voided 195 over past 24 hrs, with 10-5 cc/ voiding per hr and UOP for LOS is + 23 L, and for 24 hrs is pos 1.2 L, s/p repeat HD session-- with 2 L net negative .   - renal following  - Monitor renal function and UOP, and electrolytes   -- willard placed on ICU admission, __ d/cd willard yesterday, did not void, UOP has been minimal, _ bladder scan and no willard is indicated       Hyponatremia   - c/w Salt tabs - s/p Hypertonic 1.5%NS @ 50cc/hr x 5 hr;  --as per nephrology, Hyponatremia - low but acceptable, on NaCl tabs + intermittent HD    # Hyperphosphatemia (resolved)   - PTH normal and elevated phos likely from renal failure  - s/p Phos binder with Renvela - now d'beth as level wnl      INFECTIOUS DISEASE  # ESBL ECOLI and MSSA VAP c/b MSSA bacteremia   - Course complicated by recurrent fevers, T max 100 today and 101.1 yesterday, last temp prior -- 100.5 on 9/20, WBC 8   - RVP/ COVID (8/11) negative   - SCx (8/11) with MSSA s/p cefepime (8/12-8/13) and then ancef (8/14) however noted with drug reaction and then changed to vanco and aztreonam (8/12-8/13) in ICU .   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  - s/p Ana Cristina (9/4 - 9/22 ) and Vanco by level (last dose 9/22 ) , pt noted w/ new erythematous patchy rash, did not improve after discontinuing the ana cristina, so vanco switched to changed to Daptomycin per ID recs (9/26-), monitor CK's  - s/p fluconazole 200 qd (9/219/28) Dc'd per ID recs given family states rash appeared post fluconazole initiation  - BCx 9/26- NTD, Urine Cx 9/26- NTD, MRSA pcr+ staph  -- recurrent F with 101.1 today __ repeat blood cxs sent, sputum cx sent  -- as per discussion with ID, added meropenem empirically (9/29 -  ) and started on Valtrex (9/29- ) as per ophthalmology recs (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 Debrided crusting of auricle and EAC evaluated with edema and unable to visualize TM. EAC debrided and found with watery exudate.   - s/p CiproHC (9/5 - 9/16)     # MRSA PCRs   - MRSA PCR (8/11 and 8/14) negative. 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   - Spoke with GI for clearance to start Heparin gtt for + DVT   - c/w Heparin gtt (9/21-), aPTT are therapeutic-- 62>65, pt's specific-- aPTT goal 60-85, aPTT daily    Vascular  # DVT  - Pt with + popliteal DVT on SCD Spoke with GI no absolute contraindication for starting Heparin - advise close monitoring for bleeding - Do stat CTA if bleeding occurs and call IR   - c/w Heparin gtt (9/21-)    ONC   # Hx of Breast CA   - Patient dx in 2018 and s/p BL mastectomy (radical on right) and chemo and RT.   - Supportive care.     ENDOCRINE  # IDDM2   - Monitor FS and adjust as needed   - started stress dose steroids 9/27, tepering down to 25 mg TID today, monitor FS -- 251>237   - -increased NPH to 18 U q 6 hrs, continue ISS     LINES/ TUBES  - R IJ ILENE (8/19 - 9/21)   - L IJ ilene (9/27 - )  -- PIV's     SKIN  # Drug Eruption   - Attempted cefepime (8/12) vs ancef (8/13-8/14) and then noted with drug rxn.   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 While in MICU patient noted with worsening renal failure requiring HD initiation, CGE/ Melena s/p EGD 8/31 found with esophagitis and erosive gastropathy, new right cerebellar infarct and fevers second to ESBL COLI and MSSA VAP, MSSA bacteremia, left ear otitis externa and drug rxn to cephalosporins Course further complicated by prolonged vent time s/p tracheostomy 9/1 and transferred to RCU 9/3 completed by recurrent fevers with high PIP, respiratory acidosis and concern for volume overloaded.     NEUROLOGY  # Metabolic Encephalopath vs NEW cerebella infarct   - Baseline MS AOX3 with short term memory changes per family however noted with concern for poor mentation in ICU  - MRI (8/17) with NEW right cerebellar infarct and old left PCA/occipital infarcts;  - STEPHANIE (8/17) with AV showing two linear mobile echogenic elements attached to valve leaflets consistent with Lambel's excrescence, but no TRINITY thrombus, and lambel's excrescence with uncertain clinical implication.   - EEG (8/11-8/15) with no seizures   - ILR interrogation (9/7) with SR and PACs falsely recorded as AF.   - Attempted to resume ASA for medical management but held given GIB   - Continue Lipitor   - Course complicated by questionable head and body shaking 9/8 prolactin elevated, however was shaking head yes/no to some questions.   - rCTH 9/26 for worsening encephalopathy-  Vague questionable areas of hypoattenuation within the bilateral cerebellar hemispheres which may be compatible with acute/subacute ischemia. Recommend further evaluation with a brain MRI study, provided there are no MRI contraindications. No acute intracranial hemorrhage. Previously seen questionable vague wedge-shaped area of hypoattenuation in the left parietal lobe with artifactual secondary to motion and volume averaging with a prominent sulcus. Chronic left occipital lobe infarct.  - neuro consulted- reccs: New CTH from 9/26 without any evidence of acute infarct -> can consider repeat CTH down the line in mental status does not improve with treatment of underlying metabolic derangements and infections.  -- ECHO (9/6) unable to rule out endocarditis, repeat TTE to evaluate to endocarditis   -- started on ASA 81 mg  __ bleeding resolved and pt has not been transfused since 9/19     CARDIOVASCULAR  # HTN Urgency   # Septic vs vasoplegic shock   - weaned off levophed on 9/28 at 7:55 am, required 0.01 Levophed yesterday with HD on 9/28,   -- repeat HD today --tolerated without any pressors     - Holding Labetalol, Catapres , Cardura   - started stress does steroids (9/27 - Solu_Cortef 50 TID, tapered to 25 mg TID (9/29)  -- discontinue Bumex, there is no improvement in UOP   -- - last PRBC transfusion on 9/19 -- started ASA on 9/29    # Hx of HFpEF with likely ADHF with volume overload.   - ECHO (7/2023) with EF 59 with severe LVH and diastolic dysfunction   - STEPHANIE (8/18) with normal LVRVSF however noted with increased LA pressures and persistent LA septal bowing into RA.   - ECHO (8/11) with EF 60 with mild LVH, normal LVRVSF, and mild ddfxn.  - Remove volume with HD sessions and intermittent bumex pushes as BP allows    - discontinue Bumex 2mg --  UOP is minimal  and cont HD sessions    HEENT   # Left ear OE   - ENT evaluation (9/7) with no mastoid tenderness, however found with positive swelling and excoriation to left auricle and tenderness to manipulation of auricle. Debrided crusting of auricle and EAC evaluated with edema and unable to visualize TM. EAC debrided and found with watery exudate.   - s/p CiproHC (9/5 - 9/16)   - Ana Cristina discontinued. Rash on torso concerning for possible drug rash  - ENT following and ear wick change QD, Wick was removed  - ENT recommending CT IAC w/ IV con but family is refusing as concerned given CKD, kidneys wouldn't recover from IV contrast. Spoke with Nephrology, ENT, and patient's  at length. Planned for CT non con and per , decision will be made from there but for now doesn't want to risk further harming kidneys.  - CT IAC showing acute on chronic left-sided otitis externa and acute on chronic left-sided otomastoiditis. Superimposed left-sided tympanosclerosis. Superimposed cholesteatoma formation within the left tympanomastoid cavity cannot be excluded. No evidence of malignant otitis externa.  - ENT consulted (9/20) for white purulent discharge from left ear, recc: ENT: acetic acid drops BID to left ear. Mupirocin ointment to the left pinna BID, cover with xeroform after placing mupirocin ointment. Pressure offloading of the left ear. No plan for any surgical intervention from ENT at this time, ENT will sign off    # + L eye redness in setting of surrounding infectious process - Ophthalmology following, see in ID ,   -- L eye __ possible endophthalmitis, possible HSV  -- Continue Ofloxacin 1 drop left eye 6 times a day (ordered by ophtho)  - Continue erythromycin ointment 4 times a day in the left eye (ordered by ophtho)  - PLEASE TAPE LEFT EYE SHUT AT ALL TIMES (can remove tape when administering eye drops)  - Continue preservative free artificial tears 4 times a day in the right eye (ordered by ophtho)  - Continue Lacrilube ointment twice a day in the right eye     # Oral Lesions with questionable Zoster vs Trauma?   - Patient with tongue pain and seen with anterior ulcerations consolidating and crusting and posterior ulceration.  - Case discussed with pathology resident and culture/ cytology sent.   - HSV negative and Path (9/6) with normal appearing epithelial cells singly and in clusters devoid of viral cytopathic effect.   - Continue on magic mouth wash for pain    RESPIRATORY  # Acute hypoxic/hypercapnic Respiratory failure likely iso volume overload   - Hx of CKD and HFpEF presented with SOB and hypercarbia second to volume overload requiring intubation and HD initiation   - Vent weaning attempted however limited requiring tracheostomy (9/1) with IP   - Course complicated by PIPs elevated (50s) and respiratory acidosis second to secretions vs volume ?    - Vent settings: 22/340/8/40%,   -- VBG 7.35/49. minimal secretions   - POCUS (9/8) with BL pleural effusions (large on right and small on left)   - CT CHEST (9/8) with TBM, BL pleural effusions and continued consolidations   - (9/27) Over the past 24 hrs, pt with increasing O2 requirements and respiratory acidosis with poor TVs. Bedside bronchoscopy (+) severe dynamic airway collapse into distal end of tracheostomy extending to main ines and RM bronchus.  tracheostomy exchanged at bedside to size 6dXLT with repeat bronchoscopy showing some but not complete improvement in dynamic collapse.   - Continue volume removal with HD   - Continue on Duoneb for airway clearance        GI  # UGIB   - CGE x 1 episode on 8/24 and melena x 2 episodes on 8/31 likely residual blood.   - EGD (8/31) found with esophagitis and erosive gastropathy  - last PRBC transfusion on 9/19   - Continue on PPI BID through late October and then PPI QD   - No melena     # Dysphagia   - NGT-TF continued, changed to R nostril today given infections on L side  - Pending PEG however needs improvement in infectious status prior to placement.   -- pt is tolerating TF, + BM on 9/28,   --restarted BR     RENAL  # Progressive CKD   - Presented volume overload and progressive RUSS on CKD (baseline CRE in 2s and mode into the 3s on admission) likely obstruction vs low flow state with shock vs AIN from drug reaction issue?  - POCUS with no signs of hydronephrosis   - s/p Pressor support started with improvement in renal function  - Attempted steroid course in ICU without improvement in renal function   - HD inititated in ICU for volume overload, held 9/20 iso malfunctioning shiley and stable labs/vol status   - iso worsening hypoxia/respiratory acidosis, L IJ shiley placed 9/27 and HD restarted. s/p 1.3L fluid removal 9/27.   -- HD on 9/27-- neg 1.3 L net and 9/28 with 1.8 L net neg, pt voided 195 over past 24 hrs, with 10-5 cc/ voiding per hr and UOP for LOS is + 23 L, and for 24 hrs is pos 1.2 L, s/p repeat HD session-- with 2 L net negative .   - renal following  - Monitor renal function and UOP, and electrolytes   -- willard placed on ICU admission, __ d/cd willard yesterday, did not void, UOP has been minimal, _ bladder scan and no willard is indicated       Hyponatremia   - c/w Salt tabs - s/p Hypertonic 1.5%NS @ 50cc/hr x 5 hr;  --as per nephrology, Hyponatremia - low but acceptable, on NaCl tabs + intermittent HD    # Hyperphosphatemia (resolved)   - PTH normal and elevated phos likely from renal failure  - s/p Phos binder with Renvela - now d'beth as level wnl      INFECTIOUS DISEASE  # ESBL ECOLI and MSSA VAP c/b MSSA bacteremia   - Course complicated by recurrent fevers, T max 100 today and 101.1 yesterday, last temp prior -- 100.5 on 9/20, WBC 8   - RVP/ COVID (8/11) negative   - SCx (8/11) with MSSA s/p cefepime (8/12-8/13) and then ancef (8/14) however noted with drug reaction and then changed to vanco and aztreonam (8/12-8/13) in ICU .   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  - s/p Ana Cristina (9/4 - 9/22 ) and Vanco by level (last dose 9/22 ) , pt noted w/ new erythematous patchy rash, did not improve after discontinuing the ana cristina, so vanco switched to changed to Daptomycin per ID recs (9/26-), monitor CK's  - s/p fluconazole 200 qd (9/219/28) Dc'd per ID recs given family states rash appeared post fluconazole initiation  - BCx 9/26- NTD, Urine Cx 9/26- NTD, MRSA pcr+ staph  -- recurrent F with 101.1 on 9/29 __ repeat blood cxs sent- -on 9/29 -- follow up, sputum cx sent  -Continue Daptomycin (plan for 4 weeks through 10/2)  -Sputum 9/29 Cx with pseudomonas/proteus  -- as per discussion with ID, added meropenem empirically (9/29 -  ) and started on Valtrex (9/29- ) as per ophthalmology recs (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 Debrided crusting of auricle and EAC evaluated with edema and unable to visualize TM. EAC debrided and found with watery exudate.   - s/p CiproHC (9/5 - 9/16)   -- ENT signed off     # MRSA PCRs   - MRSA PCR (8/11 and 8/14) negative. 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   - Spoke with GI for clearance to start Heparin gtt for + DVT   - c/w Heparin gtt (9/21-), aPTT are therapeutic-- 62>65, pt's specific-- aPTT goal 60-85, aPTT daily    Vascular  # DVT  - Pt with + popliteal DVT on SCD Spoke with GI no absolute contraindication for starting Heparin - advise close monitoring for bleeding - Do stat CTA if bleeding occurs and call IR   - c/w Heparin gtt (9/21-)    ONC   # Hx of Breast CA   - Patient dx in 2018 and s/p BL mastectomy (radical on right) and chemo and RT.   - Supportive care.     ENDOCRINE  # IDDM2   - Monitor FS and adjust as needed   - started stress dose steroids 9/27, tepering down to 25 mg TID today, monitor FS -- 251>237   - -increased NPH to 18 U q 6 hrs, continue ISS     LINES/ TUBES  - R ARTHUR TAYLOR (8/19 - 9/21)   - L ARTHUR taylor (9/27 - )  -- PIV's     SKIN  # Drug Eruption   - Attempted cefepime (8/12) vs ancef (8/13-8/14) and then noted with drug rxn.   - Hold further cephalosporins at this time   - s/p Steroids with prednisone 40 (8/18 - 9/2) then prednisone 30 (9/3-9/7), then prednisone 20 (9/8 - 9/10), then prednisone 10mg (9/11-9/13)      ETHICS/ GOC    - FULL CODE   -- pt's spouse and daughter are at the bedside and were updated on pt;s condition, all questions were answered     DISPO: MICU, transfer to RCU in am

## 2023-09-30 NOTE — PROGRESS NOTE ADULT - ASSESSMENT
Assessment and Recommendations:  73y female with a past medical history/ocular history of DM, HTN, CKD, breast CA, respiratory arrest and cardiac arrest (2018), CVA with residual weakness, aspiration PNA h/o trache, PEG, Diabetic retinopathy, consulted for red left eye, found to have left sided injection, chemosis, and descemet membrane folds concerning for early infection vs. HSV.     #Chemosis OD  # Hemorraghic Chemosis OS  # Delle OS  # POOJA OU  - Unable to test patient's visual acuity, color vision, EOM, due to current status  - Anterior exam OS with improvement in subconj heme and chemosis. Still with diffuse d-folds  - Significant SPK OU  - Delle OS appears resolved today  - DDx includes early endophthalmitis vs. HSV  - Continue Ofloxacin 1 drop left eye 6 times a day (ordered by ophtho)  - Continue erythromycin ointment 4 times a day in the left eye (ordered by ophtho)  - PLEASE TAPE LEFT EYE SHUT AT ALL TIMES (can remove tape when administering eye drops)  - Continue preservative free artificial tears 4 times a day in the right eye (ordered by ophtho)  - Continue lacrilube ointment twice a day in the right eye   - Please start Valtrex 500 TID (if pt can tolerate per primary team) for coverage of possible HSV  - Ophtho will follow  - Discussed findings with primary team       Outpatient Follow-up: Patient should follow-up with his/her ophthalmologist or with [facility] Department of Ophthalmology within 1 week of after discharge at:    [address]  [place] [address]  [phone]    Latricia France MD, PGY-3  Contact: [employer]

## 2023-09-30 NOTE — PROGRESS NOTE ADULT - SUBJECTIVE AND OBJECTIVE BOX
[facility] DEPARTMENT OF OPHTHALMOLOGY  ------------------------------------------------------------------------------  Jerry Kuhn MD PGY 2  Contact via [employer] Teams  ------------------------------------------------------------------------------    Interval History: No acute events overnight.     MEDICATIONS  (STANDING):  acetaminophen   IVPB .. 1000 milliGRAM(s) IV Intermittent once  acetic acid 0.25% Topical Irrigation 1 Application(s) Topical two times a day  albuterol/ipratropium for Nebulization 3 milliLiter(s) Nebulizer every 6 hours  artificial tears (preservative free) Ophthalmic Solution 1 Drop(s) Right EYE every 2 hours  aspirin  chewable 81 milliGRAM(s) Enteral Tube daily  atorvastatin 40 milliGRAM(s) Oral at bedtime  buMETAnide Injectable 2 milliGRAM(s) IV Push <User Schedule>  chlorhexidine 0.12% Liquid 15 milliLiter(s) Oral Mucosa every 12 hours  chlorhexidine 2% Cloths 1 Application(s) Topical daily  DAPTOmycin IVPB 500 milliGRAM(s) IV Intermittent every 48 hours  dextrose 5%. 1000 milliLiter(s) (50 mL/Hr) IV Continuous <Continuous>  dextrose 5%. 1000 milliLiter(s) (100 mL/Hr) IV Continuous <Continuous>  dextrose 50% Injectable 12.5 Gram(s) IV Push once  dextrose 50% Injectable 25 Gram(s) IV Push once  dextrose 50% Injectable 25 Gram(s) IV Push once  epoetin odalis (EPOGEN) Injectable 15551 Unit(s) SubCutaneous <User Schedule>  erythromycin   Ointment 1 Application(s) Left EYE four times a day  ferrous    sulfate Liquid 300 milliGRAM(s) Oral daily  glucagon  Injectable 1 milliGRAM(s) IntraMuscular once  heparin  Infusion 1050 Unit(s)/Hr (10 mL/Hr) IV Continuous <Continuous>  hydrocortisone sodium succinate Injectable 25 milliGRAM(s) IV Push every 8 hours  insulin lispro (ADMELOG) corrective regimen sliding scale   SubCutaneous every 6 hours  insulin NPH human recombinant 18 Unit(s) SubCutaneous every 6 hours  meropenem  IVPB 500 milliGRAM(s) IV Intermittent every 24 hours  midodrine. 30 milliGRAM(s) Oral every 8 hours  mupirocin 2% Ointment 1 Application(s) Topical two times a day  ofloxacin 0.3% Solution 1 Drop(s) Left EYE every 2 hours  pantoprazole  Injectable 40 milliGRAM(s) IV Push two times a day  petrolatum Ophthalmic Ointment 1 Application(s) Right EYE two times a day  psyllium Powder 1 Packet(s) Oral daily  sodium chloride 2 Gram(s) Oral two times a day  valACYclovir 500 milliGRAM(s) Oral every 24 hours    MEDICATIONS  (PRN):  acetaminophen   Oral Liquid .. 650 milliGRAM(s) Oral every 6 hours PRN Temp greater or equal to 38C (100.4F), Mild Pain (1 - 3)  artificial tears (preservative free) Ophthalmic Solution 1 Drop(s) Both EYES three times a day PRN Dry Eyes  calamine/zinc oxide Lotion 1 Application(s) Topical two times a day PRN Rash and/or Itching  dextrose Oral Gel 15 Gram(s) Oral once PRN Blood Glucose LESS THAN 70 milliGRAM(s)/deciliter  sodium chloride 0.9% lock flush 10 milliLiter(s) IV Push every 1 hour PRN Pre/post blood products, medications, blood draw, and to maintain line patency      VITALS: T(C): 36.4 (09-30-23 @ 12:00)  T(F): 97.5 (09-30-23 @ 12:00), Max: 100 (09-30-23 @ 04:00)  HR: 106 (09-30-23 @ 13:00) (95 - 118)  BP: 108/34 (09-30-23 @ 13:00) (102/38 - 135/49)  RR:  (16 - 25)  SpO2:  (93% - 100%)  Wt(kg): --  General: AAO x 0, appropriate mood and affect    KENDY VA, EOM, color plates, CVF 2/2 patient status.    Hand held Slit Lamp Exam:  External: Flat OU  Lids/Lashes/Lacrimal Ducts: Flat OU    Sclera/Conjunctiva: W+Q OD. Conjunctivochalasis OU. Trace Subconjunctival hemorrhage and injection OS. +trace chemosis OU.  Cornea: Cl OD. D-folds centrally OS. Delle at 3 o'clock OS resolved. Dense SPK OU. No epi defects OU.  Anterior Chamber: D+F OU. No hypopyon OS.   Iris: Flat OU  Lens: Cl OU

## 2023-09-30 NOTE — PROGRESS NOTE ADULT - SUBJECTIVE AND OBJECTIVE BOX
Follow Up:      Interval History/ROS:Patient is a 73y old  Female who presents with a chief complaint of Respiratory distress.  Seen in am,  at bedside, febrile this am, trach to vent.    REVIEW OF SYSTEMS  [X] ROS unobtainable because:  trach   [  ] All other systems negative except as noted below    Constitutional:  [ ] fever [ ] chills  [ ] weight loss  [ ]night sweat  [ ]poor appetite/PO intake [ ]fatigue   Skin:  [ ] rash [ ] phlebitis	  Eyes: [ ] icterus [ ] pain  [ ] discharge	  ENMT: [ ] sore throat  [ ] thrush [ ] ulcers [ ] exudates [ ]anosmia  Respiratory: [ ] dyspnea [ ] hemoptysis [ ] cough [ ] sputum	  Cardiovascular:  [ ] chest pain [ ] palpitations [ ] edema	  Gastrointestinal:  [ ] nausea [ ] vomiting [ ] diarrhea [ ] constipation [ ] pain	  Genitourinary:  [ ] dysuria [ ] frequency [ ] hematuria [ ] discharge [ ] flank pain  [ ] incontinence  Musculoskeletal:  [ ] myalgias [ ] arthralgias [ ] arthritis  [ ] back pain  Neurological:  [ ] headache [ ] weakness [ ] seizures  [ ] confusion/altered mental status    Allergies  isoniazid (Rash)  nafcillin (Unknown)  hydrALAZINE (Rash)  vitamin E (Short breath; Urticaria; Hives)  doxycycline (Rash)  cefepime (Rash)  NIFEdipine (Urticaria; Hives)    ANTIMICROBIALS:    DAPTOmycin IVPB 500 every 48 hours  meropenem  IVPB 500 every 24 hours  valACYclovir 500 every 24 hours    OTHER MEDS: MEDICATIONS  (STANDING):  acetaminophen   IVPB .. 1000 once  acetaminophen   Oral Liquid .. 650 every 6 hours PRN  albuterol/ipratropium for Nebulization 3 every 6 hours  aspirin  chewable 81 daily  atorvastatin 40 at bedtime  buMETAnide Injectable 2 <User Schedule>  dextrose 50% Injectable 12.5 once  dextrose 50% Injectable 25 once  dextrose 50% Injectable 25 once  dextrose Oral Gel 15 once PRN  epoetin odalis (EPOGEN) Injectable 08957 <User Schedule>  glucagon  Injectable 1 once  heparin  Infusion 1050 <Continuous>  hydrocortisone sodium succinate Injectable 25 every 8 hours  insulin lispro (ADMELOG) corrective regimen sliding scale  every 6 hours  insulin NPH human recombinant 18 every 6 hours  midodrine. 30 every 8 hours  pantoprazole  Injectable 40 two times a day  psyllium Powder 1 daily    Vital Signs Last 24 Hrs  T(F): 97.7 (09-30-23 @ 08:00), Max: 101.1 (09-29-23 @ 04:00)    Vital Signs Last 24 Hrs  HR: 110 (09-30-23 @ 08:05) (95 - 118)  BP: 107/37 (09-30-23 @ 08:00) (93/74 - 135/49)  RR: 22 (09-30-23 @ 08:00)  SpO2: 98% (09-30-23 @ 08:05) (93% - 100%)  Wt(kg): --    EXAM:    Physical Exam:  General:  tracheostomy   ENT: L ear with crusted dark drainage  Respiratory:   tracheostomy to vent  abd:  distended but soft  :   willard  Musculoskeletal : generalized edema  Skin:  improved rash  vascular: TRISHA odom          Labs:                        7.2    8.30  )-----------( 305      ( 30 Sep 2023 00:40 )             22.6     09-30    130<L>  |  95<L>  |  64<H>  ----------------------------<  256<H>  3.6   |  26  |  1.99<H>    Ca    9.1      30 Sep 2023 00:40  Phos  2.6     09-30  Mg     2.20     09-30    TPro  6.0  /  Alb  2.3<L>  /  TBili  0.3  /  DBili  x   /  AST  23  /  ALT  9   /  AlkPhos  306<H>  09-30    WBC Trend:  WBC Count: 8.30 (09-30-23 @ 00:40)  WBC Count: 8.30 (09-29-23 @ 03:21)  WBC Count: 7.43 (09-28-23 @ 21:00)  WBC Count: 11.41 (09-28-23 @ 02:15)    Creatine Trend:  Creatinine: 1.99 (09-30)  Creatinine: 1.78 (09-29)  Creatinine: 2.36 (09-28)  Creatinine: 2.25 (09-27)    Liver Biochemical Testing Trend:  Alanine Aminotransferase (ALT/SGPT): 9 (09-30)  Alanine Aminotransferase (ALT/SGPT): 11 (09-29)  Alanine Aminotransferase (ALT/SGPT): 14 (09-28)  Alanine Aminotransferase (ALT/SGPT): 17 (09-27)  Alanine Aminotransferase (ALT/SGPT): 14 (09-26)  Aspartate Aminotransferase (AST/SGOT): 23 (09-30-23 @ 00:40)  Aspartate Aminotransferase (AST/SGOT): 24 (09-29-23 @ 03:21)  Aspartate Aminotransferase (AST/SGOT): 37 (09-28-23 @ 02:15)  Aspartate Aminotransferase (AST/SGOT): 57 (09-27-23 @ 02:50)  Aspartate Aminotransferase (AST/SGOT): 40 (09-26-23 @ 16:05)  Bilirubin Total: 0.3 (09-30)  Bilirubin Total: 0.3 (09-29)  Bilirubin Total: 0.3 (09-28)  Bilirubin Total: 0.4 (09-27)  Bilirubin Total: 0.2 (09-26)    Trend LDH  08-24-23 @ 03:23  189    Urinalysis Basic - ( 30 Sep 2023 00:40 )    Color: x / Appearance: x / SG: x / pH: x  Gluc: 256 mg/dL / Ketone: x  / Bili: x / Urobili: x   Blood: x / Protein: x / Nitrite: x   Leuk Esterase: x / RBC: x / WBC x   Sq Epi: x / Non Sq Epi: x / Bacteria: x    MICROBIOLOGY:  Vancomycin Level, Trough: 15.1 (09-26 @ 06:15)  Vancomycin Level, Trough: 18.4 (09-25 @ 05:15)  Vancomycin Level, Random: 18.0 (09-24 @ 06:00)  Vancomycin Level, Random: 20.3 (09-23 @ 01:05)  Vancomycin Level, Random: 12.6 (09-22 @ 07:40)  Vancomycin Level, Random: 20.6 (09-20 @ 06:02)  Vancomycin Level, Random: 20.7 (09-18 @ 06:15)  Vancomycin Level, Random: 15.1 (09-16 @ 06:00)  Vancomycin Level, Trough: 16.7 (09-15 @ 01:37)  Vancomycin Level, Random: 23.4 (09-13 @ 02:00)  Vancomycin Level, Random: 21.5 (09-12 @ 13:00)  Vancomycin Level, Trough: 24.2 (09-12 @ 05:10)    MRSA PCR Result.: NotDetec (09-26-23 @ 18:13)  MRSA PCR Result.: NotDetec (09-10-23 @ 05:50)  MRSA PCR Result.: NotDetec (08-14-23 @ 09:50)  MRSA PCR Result.: NotDetec (08-11-23 @ 18:00)  MRSA PCR Result.: NotDetec (07-17-23 @ 15:12)    Culture - Sputum (collected 29 Sep 2023 04:43)  Source: ET Tube ET Tube    Culture - Urine (collected 26 Sep 2023 18:08)  Source: Clean Catch Clean Catch (Midstream)  Final Report:    10,000 - 49,000 CFU/mL Candida albicans "Susceptibilities not performed"    Culture - Blood (collected 26 Sep 2023 16:00)  Source: .Blood Blood-Peripheral  Preliminary Report:    No growth at 72 Hours    Culture - Blood (collected 20 Sep 2023 19:26)  Source: .Blood Blood-Venous  Final Report:    No growth at 5 days    Culture - Blood (collected 20 Sep 2023 12:09)  Source: .Blood Blood-Peripheral  Final Report:    No growth at 5 days    Culture - Blood (collected 10 Sep 2023 18:5Source: .Blood Blood-Venous  Final Report:    No growth at 5 days    Culture - Sputum (collected 10 Sep 2023 10:45)  Source: .Sputum Sputum  Final Report:    Normal Respiratory Cate present    Culture - Blood (collected 10 Sep 2023 08:15)  Source: .Blood Blood-Peripheral  Final Report:    No growth at 5 days    Culture - Sputum (collected 08 Sep 2023 11:38)  Source: .Sputum Sputum  Final Report:    Normal Respiratory Cate present    Culture - Blood (collected 08 Sep 2023 07:58)  Source: .Blood Blood-Peripheral  Final Report:    No growth at 5 days    HIV-1/2 Combo Result: Nonreact (09-01-23 @ 21:00)  Rapid RVP Result: NotDetec (09-26 @ 16:30)    Blood Gas Venous - Lactate: 1.1 (09-30 @ 00:40)  Blood Gas Venous - Lactate: 1.0 (09-29 @ 03:21)  Blood Gas Venous - Lactate: 0.8 (09-28 @ 14:00)  Blood Gas Arterial, Lactate: 0.7 (09-28 @ 02:15)    RADIOLOGY:  imaging below personally reviewed    Xray Chest 1 View- PORTABLE-Urgent:  (28 Sep 2023 01:16)  Xray Chest 1 View- PORTABLE-Urgent:  (27 Sep 2023 13:56)

## 2023-09-30 NOTE — PROGRESS NOTE ADULT - SUBJECTIVE AND OBJECTIVE BOX
Patient is a 73y old  Female who presents with a chief complaint of Respiratory distress (30 Sep 2023 13:24)      SUBJECTIVE / OVERNIGHT EVENTS: sepsis resolved, off fluconazole, would add FLUCONAZOLE to the allergy list. on Meropenem, on Dapto through 10/2 instead of Vanco.Vanco was DCed as preseumed ptn had an allergic rash to Vanco. she is not allergic to Vanco. vent dependent. tolerating HD. s/p HD today, next on 10/3    MEDICATIONS  (STANDING):  acetaminophen   IVPB .. 1000 milliGRAM(s) IV Intermittent once  acetic acid 0.25% Topical Irrigation 1 Application(s) Topical two times a day  albuterol/ipratropium for Nebulization 3 milliLiter(s) Nebulizer every 6 hours  apixaban 10 milliGRAM(s) Oral every 12 hours  artificial tears (preservative free) Ophthalmic Solution 1 Drop(s) Right EYE every 2 hours  aspirin  chewable 81 milliGRAM(s) Enteral Tube daily  atorvastatin 10 milliGRAM(s) Oral at bedtime  chlorhexidine 0.12% Liquid 15 milliLiter(s) Oral Mucosa every 12 hours  chlorhexidine 2% Cloths 1 Application(s) Topical daily  DAPTOmycin IVPB 500 milliGRAM(s) IV Intermittent every 48 hours  dextrose 5%. 1000 milliLiter(s) (100 mL/Hr) IV Continuous <Continuous>  dextrose 5%. 1000 milliLiter(s) (50 mL/Hr) IV Continuous <Continuous>  dextrose 50% Injectable 12.5 Gram(s) IV Push once  dextrose 50% Injectable 25 Gram(s) IV Push once  dextrose 50% Injectable 25 Gram(s) IV Push once  epoetin odalis (EPOGEN) Injectable 71967 Unit(s) SubCutaneous <User Schedule>  erythromycin   Ointment 1 Application(s) Left EYE four times a day  ferrous    sulfate Liquid 300 milliGRAM(s) Oral daily  glucagon  Injectable 1 milliGRAM(s) IntraMuscular once  hydrocortisone sodium succinate Injectable 25 milliGRAM(s) IV Push every 8 hours  insulin lispro (ADMELOG) corrective regimen sliding scale   SubCutaneous every 6 hours  insulin NPH human recombinant 11 Unit(s) SubCutaneous every 6 hours  meropenem  IVPB 500 milliGRAM(s) IV Intermittent every 24 hours  midodrine. 30 milliGRAM(s) Oral every 8 hours  mupirocin 2% Ointment 1 Application(s) Topical two times a day  ofloxacin 0.3% Solution 1 Drop(s) Left EYE every 2 hours  pantoprazole  Injectable 40 milliGRAM(s) IV Push two times a day  petrolatum Ophthalmic Ointment 1 Application(s) Right EYE two times a day  polyethylene glycol 3350 17 Gram(s) Oral two times a day  psyllium Powder 1 Packet(s) Oral daily  senna Syrup 10 milliLiter(s) Oral daily  sodium chloride 2 Gram(s) Oral two times a day  valACYclovir 500 milliGRAM(s) Oral every 24 hours    MEDICATIONS  (PRN):  acetaminophen   Oral Liquid .. 650 milliGRAM(s) Oral every 6 hours PRN Temp greater or equal to 38C (100.4F), Mild Pain (1 - 3)  artificial tears (preservative free) Ophthalmic Solution 1 Drop(s) Both EYES three times a day PRN Dry Eyes  calamine/zinc oxide Lotion 1 Application(s) Topical two times a day PRN Rash and/or Itching  dextrose Oral Gel 15 Gram(s) Oral once PRN Blood Glucose LESS THAN 70 milliGRAM(s)/deciliter  sodium chloride 0.9% lock flush 10 milliLiter(s) IV Push every 1 hour PRN Pre/post blood products, medications, blood draw, and to maintain line patency      Vital Signs Last 24 Hrs  T(F): 98.8 (09-30-23 @ 20:00), Max: 100 (09-30-23 @ 04:00)  HR: 107 (09-30-23 @ 20:00) (95 - 118)  BP: 115/44 (09-30-23 @ 20:00) (107/37 - 135/49)  RR: 22 (09-30-23 @ 20:00) (16 - 22)  SpO2: 100% (09-30-23 @ 20:00) (95% - 100%)  Telemetry:   CAPILLARY BLOOD GLUCOSE      POCT Blood Glucose.: 131 mg/dL (30 Sep 2023 17:09)  POCT Blood Glucose.: 206 mg/dL (30 Sep 2023 11:51)  POCT Blood Glucose.: 237 mg/dL (30 Sep 2023 05:38)    I&O's Summary    29 Sep 2023 07:01  -  30 Sep 2023 07:00  --------------------------------------------------------  IN: 1300 mL / OUT: 30 mL / NET: 1270 mL    30 Sep 2023 07:01  -  30 Sep 2023 22:07  --------------------------------------------------------  IN: 1220 mL / OUT: 2400 mL / NET: -1180 mL        PHYSICAL EXAM:  GENERAL: NAD, well-developed  HEAD:  Atraumatic, Normocephalic  EYES: EOMI, PERRLA, conjunctiva and sclera clear  NECK: Supple, No JVD  CHEST/LUNG: Clear to auscultation bilaterally; No wheeze  HEART: Regular rate and rhythm; No murmurs, rubs, or gallops  ABDOMEN: Soft, Nontender, Nondistended; Bowel sounds present  EXTREMITIES:  2+ Peripheral Pulses, No clubbing, cyanosis, or edema  PSYCH: AAOx3  NEUROLOGY: non-focal  SKIN: No rashes or lesions    LABS:                        7.2    8.30  )-----------( 305      ( 30 Sep 2023 00:40 )             22.6     09-30    130<L>  |  95<L>  |  64<H>  ----------------------------<  256<H>  3.6   |  26  |  1.99<H>    Ca    9.1      30 Sep 2023 00:40  Phos  2.6     09-30  Mg     2.20     09-30    TPro  6.0  /  Alb  2.3<L>  /  TBili  0.3  /  DBili  x   /  AST  23  /  ALT  9   /  AlkPhos  306<H>  09-30    PTT - ( 30 Sep 2023 00:40 )  PTT:65.4 sec      Urinalysis Basic - ( 30 Sep 2023 00:40 )    Color: x / Appearance: x / SG: x / pH: x  Gluc: 256 mg/dL / Ketone: x  / Bili: x / Urobili: x   Blood: x / Protein: x / Nitrite: x   Leuk Esterase: x / RBC: x / WBC x   Sq Epi: x / Non Sq Epi: x / Bacteria: x        RADIOLOGY & ADDITIONAL TESTS:    Imaging Personally Reviewed:    Consultant(s) Notes Reviewed:      Care Discussed with Consultants/Other Providers:

## 2023-09-30 NOTE — PROGRESS NOTE ADULT - ASSESSMENT
74 y/o F well known to me from my [facility] outpt practice. she was admitted at [facility] 7/12-7/22 w aspiration PNA, was treated w CEFEPIME, developed an allergic rash,  dCHF, + MAC on AFB culture, had been progressively getting more and more lethargic and dyspneic at home since DC.   In  am of 8/11/23  ptn presented with respiratory distress w hypoxia and hypercarbia requiring intubation 2/2 volume overload +/- Asp PNA      Neuro   responds appropriately   Baseline MS AOx3, aphasic   - h/o CVA , on aspirin and statin . resumed w feeding tube, ASA resumed 8/26  - eeg  2/2 tremors, no sz focus  - less responsive in the past few days  - MRI 8/17:  new R cerebellar infarct, old left PCA/Occipital infarct. 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 Mcnulty, now in MICU  s/p septic shock overnight 9/1, now on meropenem, HDS  s/p trache, on vent support, copious secretions      Renal     Ptn well know to Dr. Colon,following   HD 8/19,  8/21, 8/26 and 8/28.  s/p PUF 8/29 2.5 Liters, HD 8/30,  9/1, 9/4  L IJ HD placed  uremic  hyponatremic  HD as per renal        Hypotension  - Levo drip  prognosis is poor  consider palliative care consult    GI  NPO  on tube feeds  coffee ground emesis x 1 8/24, melena overnight 8/31, s/p 1UPRBC, EGD/Colonoscopy: erosive gastropathy, esophagisits, on colonoscopy old blood seen no active bleeding, poor prep  family to decide re PEG placement    Endocrine  T2DM   A1C 7.1 in 7/2023   - BG goal 140-200     ID   No fever/leukocytosis, recheck temp   Hx latent TB which was treated, no concern for TB   - grew MAC on AFB culture from most recent admission. at [facility]  -MSSA Bacteremia 9/1, allergic to cephalosprins and PCN, on Vanco as per ID, renal dosing,   - sputum also grew E.Coli-ESBL, as per ID recs for  Bradford through 9/7( 1 week course as per ID) , afebrile.  - 9/8:  spike  temp 38.1C, has O. externa, has a wick, recs are to get a CT to R/O an abscess/bony involvement. cont Meropenem  9/9: ptn w fever overnight to 100.8,  may need shiley pulled if bacteremic, needs NECK CT as per ENT to R/O Mastoid bone involvement while having ongoing purulent DC from L ear 2/2 O. externa, getting daily wick changes  9/10:  spiked 102 overnight through Bradford and Vanco, purulent DC from O. externa, ENT recommend Ct of mastoid. ptn in HD, has Shiley in place, consider pulling shiley and send for Cx. would d/w Renal, and consider contacting  ID, last seen by Dr. Conner 9/6 9/11: remains febrile, Dr. Conner reconsulted. cont Bradford, Vanco  9/12: tmax 100.5, fever curve is improving, awaiting Left ear CT, on Bradford since 9/1. on  vanco for MSSA Bacteremia, does not tolerate cephalosporins. through 10/2  9/13: on full vent support via trache, appears uncomfortable and onur 2/2 Left O. externa. has an incidental left middle ear tumor, no acute middle ear interventions recommended, left ear wick replaced. on Meropenem, cx from Ear DC is neg. On Vanco for MSSA bacteremia through 10/2, course of Meropenem as per ID, afebrile in the past 24 hrs . Cardura for HTN resumed, HGB dropped to 6.4, got a dose of EPO and 1UPRBC, rpt 7.8, remains on HEPARIN drip for LEFT popliteal DVT  9/14: cont on Mupirocin locally to Left ear, cont IV Bradford, ENT signed off, afebrile x 48 hrs, Mro course to be determined by ID, on Vanco for MSSA bacteremia through 10/2. ongoing worsening hyponatremia, Hypertonic saline as per renal, no HD today, s/p 1UPRBC 9/13  9/15: spiking temps again, if cont to spike as per ID re PAN scan. cont Vanco for MSSA Bacteremia  9/16-18: no temp overnight  afebrile, cont to have Left ear DC,   on Vanco and ,bradford through 10/2  On IV Fluconazole for fungal cx+ from O. externa Discharge.   fluconazole started 9/21, ptn developed an allergic rash on 9/22. doubt its 2/2 to MEROPENEM or Vanco.  MEropenem was DCed 2/2 causing possible drug rash.  on VANCO based on levels for MSSA bacteremia but DCed 2/2 poss drug rash, now on Daptomycin  9/29: ptn is septic, meropenem resumed, s/p HD 9/28, next HD tomorrow    Heme/Onc  ppx: place on SCD  Transfuse for hgb 6.4, no obv bleed. HDS  LEFT POPLITEAL VEIN ACUTE DVT on 9/10, on Heparin drip    Ethics  GOC - Discussed GOC with daughter and , they have opted in the past for full code. and she remains full code at present      9/27:  In [place], R IJ HD catheter placed, NGT in place, acidotic on VBG, trache to vent, anasarca, on IV BUMEX, on Levo, on Heparin drip, on stress dose HYdrocortisone, FS in range, uremic, awaiting HD, CT C/A/P w no acute findings. VANCO Dced , now on Dapto, for MSSA baceteremia through 10/2    9/28: HD started 9/27, still encephalopathic, fluconazole DCed as it could be the cause of the rash. on Dapto to complete a course of Abx for MSSA bacteremia through 10/2, VANCO DCed 2/2 possible rash. off pressors    9/30: sepsis resolved, off fluconazole, would add FLUCONAZOLE to the allergy list. on Meropenem, on Dapto through 10/2 instead of Vanco.Vanco was DCed as preseumed ptn had an allergic rash to Vanco. she is not allergic to Vanco. vent dependent. tolerating HD. s/p HD today, next on 10/3

## 2023-09-30 NOTE — PROGRESS NOTE ADULT - ASSESSMENT
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 family declined renal biopsy started on prednisone  s/p trach 9/1 and BAL with MSSA   ET cx with ESBL and MSSA  started on ana cristina 9/1  blood cx 9/1: MSSA   repeat negative 9/4, 9/8  CXR with b/l opacities, R increased  L ear edema and otitis externa, the last cx showed candida and ENT stated it could be fungal (saw black spores?)    initial  resp failure for ?fluid ovrer load but also had MSSA in the sputum, got vanco, aztreonam one day then cefepime and had rash, renal failure which was considered due to cefepime and then received 2 days of cefazolin and then off, now with trach and bronc on 9/1 with MSSA bacteremia and BAL also MSSA  another ET cx with MSSA and ESBL E-coli  hypotension, MSSA bacteremia likely due to pneumonia, repeat blood cx negative 9/4, TTE limited unable to exclude endocarditis  L otitis externa on ana cristina since 9/1 but worsening edema and black discoloration with bullae forming and persistent fever (ear cx was negative)  Ct showed acute on chronic otitis externa and otomastoiditis , superimposed cholesteatoma can not be excluded, no malignant otitis externa  pt again febrile, ENT stated there was black spores which could be a fungal infection and the last cx showed candida albicans s/p 7 days of fluconazole   new erythematous patchy rash, did not improved after discontinuing the ana cristina so vanco switched to dapto but still with rash and it also started after pt was started on fluconazole so not sure which caused it  also hypotensive now and ?uveitis vs endophthalmitis, head CT with acute/subacute ischemia and old occipital infarct, chest/abd CT with unchanged  RUL consolidation, decreased BEVERLY consolidation  pt uremic as well, s/p shiley and resumed on HD 9/27  s/p bronc with excessive dynamic airway collapse s/p trach change and some improvement  pt febrile and tachy on 9/29    #Fever   #MSSA bacteremia 2/2 to pneumonia   #Acute on chronic otitis externa, otomastoiditis, candida albicans on ear Cx s/p Fluconazole ( ?developed allergic reaction post Fluc)  #eye infection ( uveitis Vs endophthalmitis) Vs HSV  #worsening kidney function on HD    Recommendations:   -Continue Daptomycin (plan for 4 weeks through 10/2)  -Meropenem added on 9/29  -F/up repeat blood cx sent on 9/29  -Sputum Cx with GNR , f/up final report and fungal Cx   -Remove willard if no indication  -monitor CK  -monitor CBC/diff and renal function  -f/u with ophthalmology, they recommended valtrex as unclear eye pathology      Valarie Maya MD, PGY5  ID fellow  [employer] Preferred  After 5pm/weekends call [phone]

## 2023-09-30 NOTE — PROGRESS NOTE ADULT - ATTENDING COMMENTS
73 f with DM, HTN, CKD, breast CA, latent TB (treated first with INH then had rash and switched to rifampin), MSSA bacteremia, c-diff, respiratory arrest and cardiac arrest (2018), s/p trach/PEG then removed, CVA with residual weakness, aspiration PNA, 1/4 sputums had MAC 7/2023, admitted 8/11 with respiratory failure    blood cx negative, sputum cx with MSSA  s/p vanco and aztreonam 8/11=> s/p cefepime 8/12 and had ?rash => s/p cefazolin 8/13-8/14    head MRI 8/17 with acute cerebellar infarct  had renal failure, AIN? family declined renal biopsy started on prednisone  s/p trach 9/1 and BAL with MSSA   ET cx with ESBL and MSSA  started on ana cristina 9/1  blood cx 9/1: MSSA   repeat negative 9/4, 9/8  CXR with b/l opacities, R increased  L ear edema and otitis externa, the last cx showed candida and ENT stated it could be fungal (saw black spores?)    MSSA bacteremia/ MSSA pneumonia    new erythematous patchy rash  Antibiotics changed  also hypotensive   now and ?uveitis vs endophthalmitis, head CT with acute/subacute ischemia and old occipital infarct, chest/abd CT with unchanged  RUL consolidation, decreased BEVERLY consolidation    pt uremic as well, s/p shiley and resumed on HD 9/27  s/p bronc with excessive dynamic airway collapse s/p trach change and some improvement  pt febrile and tachy on 9/29    #Fever   #MSSA bacteremia 2/2 to pneumonia   #Acute on chronic otitis externa, otomastoiditis, candida albicans on ear Cx s/p Fluconazole ( ?developed allergic reaction post Fluc)  #eye infection ( uveitis Vs endophthalmitis) Vs HSV  #worsening kidney function on HD    Recommendations:   -Continue Daptomycin (plan for 4 weeks through 10/2)  -Meropenem added on 9/29  -F/up repeat blood cx sent on 9/29  -Sputum Cx with pseudomonas/proteus  -Remove willard if no indication  -monitor CK  -monitor CBC/diff and renal function  -f/u with ophthalmology, they recommended valtrex as unclear eye pathology

## 2023-09-30 NOTE — PROGRESS NOTE ADULT - SUBJECTIVE AND OBJECTIVE BOX
Significant recent/past 24 hr events:    Subjective:    Review of Systems         [ ] A ten-point review of systems was otherwise negative except as noted.  [ ] Due to altered mental status/intubation, subjective information were not able to be obtained from the patient. History was obtained, to the extent possible, from review of the chart and collateral sources of information.      Patient is a 73y old  Female who presents with a chief complaint of Respiratory distress (29 Sep 2023 17:59)    HPI:  72 y/o F DM on insulin, HTN, CKD,breast CA, respiratory arrest and cardiac arrest (2018), CVA with residual weakness, aspiration PNA h/o trache, PEG, both removed presents with respiratory distress requiring intubation. Had recent admission d/c 7/22/23 for cough and respiratory distress (no intubation) thought to be i/s/o aspiration PNA s/p cefepime course; developed rash in response. Infectious w/u was negative at this time. Was discharged home, daughter and  at bedside providing history.     Reports that she was initially improving with O2 sats in the high 90s on room air, however, then became more lethargic and not herself (baseline mental status AOx3). Also had worsening shortness of breath while laying down and leg swelling. Contacted cardiologist who started her on bumex 1mg daily. Developed low sugars overnight before admission and was given juice with some food, daughter reports no coughing during episode. At around 4-5 AM developed vomiting and coughing, O2 sats dropped to 80s and EMS was called. Denies fevers/chills, dysuria or urinary frequency, chest pain, palpitations. Uses wheelchair at home however family moves her around frequently.     In ED, was on BiPAP with increased WOB and was intubated. Found to have elevated pro-BNP and CO2 of 111 on VBG. CXR with small right pleural effusion, started on vanc in the ED.  (11 Aug 2023 09:08)    PAST MEDICAL & SURGICAL HISTORY:  Breast CA      Diabetes      Stroke      Cardiac arrest      HTN (hypertension)      H/O mastectomy, bilateral        FAMILY HISTORY:  No pertinent family history in first degree relatives        Vitals   ICU Vital Signs Last 24 Hrs  T(C): 37.8 (30 Sep 2023 04:00), Max: 38.1 (29 Sep 2023 08:00)  T(F): 100 (30 Sep 2023 04:00), Max: 100.5 (29 Sep 2023 08:00)  HR: 117 (30 Sep 2023 05:00) (95 - 118)  BP: 119/50 (30 Sep 2023 05:00) (93/74 - 135/49)  BP(mean): 67 (30 Sep 2023 05:00) (45 - 78)  ABP: --  ABP(mean): --  RR: 22 (30 Sep 2023 05:00) (18 - 25)  SpO2: 99% (30 Sep 2023 05:00) (93% - 100%)    O2 Parameters below as of 30 Sep 2023 05:00  Patient On (Oxygen Delivery Method): ventilator    O2 Concentration (%): 40        Physical Exam:   Constitutional: NAD, well-groomed, well-developed  HEENT: PERRLA, EOMI, no drainage or redness  Neck: supple,  No JVD, Trachea midline  Back: Normal spine flexure, No CVA tenderness, No deformity or limitation of movement  Respiratory: Breath Sounds equal & clear bilaterally to auscultation, no accessory muscle use noted  Cardiovascular: Regular rate, regular rhythm, normal S1, S2; no murmurs or rub  Gastrointestinal: Soft, non-tender, non distended, no hepatosplenomegaly, normal bowel sounds  Extremities: CENTENO x 4, no peripheral edema, no cyanosis, no clubbing   Vascular: Equal and normal pulses: 2+ peripheral pulses throughout  Neurological: A+O x 3; speech clear and intact; no sensory, motor  deficits, normal reflexes  Psychiatric: calm, normal mood, normal affect  Musculoskeletal: No joint swelling or deformity; no limitation of movement  Skin: warm, dry, well perfused, no rashes    VENT SETTINGS   Mode: AC/ CMV (Assist Control/ Continuous Mandatory Ventilation)  RR (machine): 22  TV (machine): 340  FiO2: 40  PEEP: 5  ITime: 0.78  MAP: 18  PIP: 50        I&O's Detail    29 Sep 2023 07:01  -  30 Sep 2023 07:00  --------------------------------------------------------  IN:    Enteral Tube Flush: 50 mL    Heparin: 180 mL    IV PiggyBack: 50 mL    Nepro with Carb Steady: 720 mL  Total IN: 1000 mL    OUT:    Indwelling Catheter - Urethral (mL): 30 mL  Total OUT: 30 mL    Total NET: 970 mL          LABS                        7.2    8.30  )-----------( 305      ( 30 Sep 2023 00:40 )             22.6     09-30    130<L>  |  95<L>  |  64<H>  ----------------------------<  256<H>  3.6   |  26  |  1.99<H>    Ca    9.1      30 Sep 2023 00:40  Phos  2.6     09-30  Mg     2.20     09-30    TPro  6.0  /  Alb  2.3<L>  /  TBili  0.3  /  DBili  x   /  AST  23  /  ALT  9   /  AlkPhos  306<H>  09-30    LIVER FUNCTIONS - ( 30 Sep 2023 00:40 )  Alb: 2.3 g/dL / Pro: 6.0 g/dL / ALK PHOS: 306 U/L / ALT: 9 U/L / AST: 23 U/L / GGT: x           PTT - ( 30 Sep 2023 00:40 )  PTT:65.4 sec        Urinalysis Basic - ( 30 Sep 2023 00:40 )    Color: x / Appearance: x / SG: x / pH: x  Gluc: 256 mg/dL / Ketone: x  / Bili: x / Urobili: x   Blood: x / Protein: x / Nitrite: x   Leuk Esterase: x / RBC: x / WBC x   Sq Epi: x / Non Sq Epi: x / Bacteria: x      POCT Blood Glucose.: 237 mg/dL *H* (09-30-23 @ 05:38)  POCT Blood Glucose.: 251 mg/dL *H* (09-29-23 @ 17:23)  POCT Blood Glucose.: 272 mg/dL *H* (09-29-23 @ 12:05)        MEDICATIONS  (STANDING):  acetaminophen   IVPB .. 1000 milliGRAM(s) IV Intermittent once  acetic acid 0.25% Topical Irrigation 1 Application(s) Topical two times a day  albuterol/ipratropium for Nebulization 3 milliLiter(s) Nebulizer every 6 hours  artificial tears (preservative free) Ophthalmic Solution 1 Drop(s) Right EYE every 2 hours  aspirin  chewable 81 milliGRAM(s) Enteral Tube daily  atorvastatin 40 milliGRAM(s) Oral at bedtime  buMETAnide Injectable 2 milliGRAM(s) IV Push <User Schedule>  chlorhexidine 0.12% Liquid 15 milliLiter(s) Oral Mucosa every 12 hours  chlorhexidine 2% Cloths 1 Application(s) Topical daily  chlorhexidine 4% Liquid 1 Application(s) Topical <User Schedule>  DAPTOmycin IVPB 500 milliGRAM(s) IV Intermittent every 48 hours  dextrose 5%. 1000 milliLiter(s) (50 mL/Hr) IV Continuous <Continuous>  dextrose 5%. 1000 milliLiter(s) (100 mL/Hr) IV Continuous <Continuous>  dextrose 50% Injectable 12.5 Gram(s) IV Push once  dextrose 50% Injectable 25 Gram(s) IV Push once  dextrose 50% Injectable 25 Gram(s) IV Push once  epoetin odalis (EPOGEN) Injectable 14019 Unit(s) SubCutaneous <User Schedule>  erythromycin   Ointment 1 Application(s) Left EYE four times a day  ferrous    sulfate Liquid 300 milliGRAM(s) Oral daily  glucagon  Injectable 1 milliGRAM(s) IntraMuscular once  heparin  Infusion 1050 Unit(s)/Hr (10 mL/Hr) IV Continuous <Continuous>  hydrocortisone sodium succinate Injectable 25 milliGRAM(s) IV Push every 8 hours  insulin lispro (ADMELOG) corrective regimen sliding scale   SubCutaneous every 6 hours  insulin NPH human recombinant 11 Unit(s) SubCutaneous every 6 hours  meropenem  IVPB 500 milliGRAM(s) IV Intermittent every 24 hours  midodrine. 30 milliGRAM(s) Oral every 8 hours  mupirocin 2% Ointment 1 Application(s) Topical two times a day  ofloxacin 0.3% Solution 1 Drop(s) Left EYE every 2 hours  pantoprazole  Injectable 40 milliGRAM(s) IV Push two times a day  petrolatum Ophthalmic Ointment 1 Application(s) Right EYE two times a day  psyllium Powder 1 Packet(s) Oral daily  sodium chloride 2 Gram(s) Oral two times a day  valACYclovir 500 milliGRAM(s) Oral every 24 hours    MEDICATIONS  (PRN):  acetaminophen   Oral Liquid .. 650 milliGRAM(s) Oral every 6 hours PRN Temp greater or equal to 38C (100.4F), Mild Pain (1 - 3)  artificial tears (preservative free) Ophthalmic Solution 1 Drop(s) Both EYES three times a day PRN Dry Eyes  calamine/zinc oxide Lotion 1 Application(s) Topical two times a day PRN Rash and/or Itching  dextrose Oral Gel 15 Gram(s) Oral once PRN Blood Glucose LESS THAN 70 milliGRAM(s)/deciliter  sodium chloride 0.9% lock flush 10 milliLiter(s) IV Push every 1 hour PRN Pre/post blood products, medications, blood draw, and to maintain line patency      Allergies:  isoniazid (Rash)  nafcillin (Unknown)  hydrALAZINE (Rash)  vitamin E (Short breath; Urticaria; Hives)  doxycycline (Rash)  cefepime (Rash)  NIFEdipine (Urticaria; Hives)        CRITICAL CARE TIME SPENT:  minutes of critical care time spent providing medical care for patient's acute illness/conditions that impairs at least one vital organ system and/or poses a high risk of imminent or life threatening deterioration in the patient's condition. It includes time spent evaluating and treating the patient's acute illness as well as time spent reviewing labs, radiology, discussing goals of care with patient and/or patient's family, and discussing the case with a multidisciplinary team, in an effort to prevent further life threatening deterioration or end organ damage. This time is independent of any procedures performed.         Significant recent/past 24 hr events: pt tolerates HD without vasopressors requrements     Subjective: Pt is unable to provide any history in s/o multiple strokes/ metabolic encephalopathy    Review of Systems         [ ] A ten-point review of systems was otherwise negative except as noted.  [ ] Due to altered mental status/intubation, subjective information were not able to be obtained from the patient. History was obtained, to the extent possible, from review of the chart and collateral sources of information.      Patient is a 73y old  Female who presents with a chief complaint of Respiratory distress (29 Sep 2023 17:59)    HPI:  74 y/o F DM on insulin, HTN, CKD,breast CA, respiratory arrest and cardiac arrest (2018), CVA with residual weakness, aspiration PNA h/o trache, PEG, both removed presents with respiratory distress requiring intubation. Had recent admission d/c 7/22/23 for cough and respiratory distress (no intubation) thought to be i/s/o aspiration PNA s/p cefepime course; developed rash in response. Infectious w/u was negative at this time. Was discharged home, daughter and  at bedside providing history.     Reports that she was initially improving with O2 sats in the high 90s on room air, however, then became more lethargic and not herself (baseline mental status AOx3). Also had worsening shortness of breath while laying down and leg swelling. Contacted cardiologist who started her on bumex 1mg daily. Developed low sugars overnight before admission and was given juice with some food, daughter reports no coughing during episode. At around 4-5 AM developed vomiting and coughing, O2 sats dropped to 80s and EMS was called. Denies fevers/chills, dysuria or urinary frequency, chest pain, palpitations. Uses wheelchair at home however family moves her around frequently.     In ED, was on BiPAP with increased WOB and was intubated. Found to have elevated pro-BNP and CO2 of 111 on VBG. CXR with small right pleural effusion, started on vanc in the ED.  (11 Aug 2023 09:08)    PAST MEDICAL & SURGICAL HISTORY:  Breast CA      Diabetes      Stroke      Cardiac arrest      HTN (hypertension)      H/O mastectomy, bilateral        FAMILY HISTORY:  No pertinent family history in first degree relatives        Vitals   ICU Vital Signs Last 24 Hrs  T(C): 37.8 (30 Sep 2023 04:00), Max: 38.1 (29 Sep 2023 08:00)  T(F): 100 (30 Sep 2023 04:00), Max: 100.5 (29 Sep 2023 08:00)  HR: 117 (30 Sep 2023 05:00) (95 - 118)  BP: 119/50 (30 Sep 2023 05:00) (93/74 - 135/49)  BP(mean): 67 (30 Sep 2023 05:00) (45 - 78)  ABP: --  ABP(mean): --  RR: 22 (30 Sep 2023 05:00) (18 - 25)  SpO2: 99% (30 Sep 2023 05:00) (93% - 100%)    O2 Parameters below as of 30 Sep 2023 05:00  Patient On (Oxygen Delivery Method): ventilator    O2 Concentration (%): 40        Physical Exam:   Constitutional: NAD, well-groomed, well-developed  HEENT: PERRLA, EOMI, no drainage or redness  Neck: supple,  No JVD, Trachea midline  Back: Normal spine flexure, No CVA tenderness, No deformity or limitation of movement  Respiratory: Breath Sounds equal & clear bilaterally to auscultation, no accessory muscle use noted  Cardiovascular: Regular rate, regular rhythm, normal S1, S2; no murmurs or rub  Gastrointestinal: Soft, non-tender, non distended, no hepatosplenomegaly, normal bowel sounds  Extremities: + peripheral edema, no cyanosis, no clubbing   Vascular: Equal and normal pulses: 2+ peripheral pulses throughout  Neurological: opens eyes spontaneously, not following commands, not responding to painful stimuli, no sensory, motor  deficits, normal reflexes  Psychiatric: calm, normal affect  Musculoskeletal: No joint swelling or deformity; no limitation of movement  Skin: warm, dry, well perfused, no rashes    VENT SETTINGS   Mode: AC/ CMV (Assist Control/ Continuous Mandatory Ventilation)  RR (machine): 22  TV (machine): 340  FiO2: 40  PEEP: 5  ITime: 0.78  MAP: 18  PIP: 50        I&O's Detail    29 Sep 2023 07:01  -  30 Sep 2023 07:00  --------------------------------------------------------  IN:    Enteral Tube Flush: 50 mL    Heparin: 180 mL    IV PiggyBack: 50 mL    Nepro with Carb Steady: 720 mL  Total IN: 1000 mL    OUT:    Indwelling Catheter - Urethral (mL): 30 mL  Total OUT: 30 mL    Total NET: 970 mL          LABS                        7.2    8.30  )-----------( 305      ( 30 Sep 2023 00:40 )             22.6     09-30    130<L>  |  95<L>  |  64<H>  ----------------------------<  256<H>  3.6   |  26  |  1.99<H>    Ca    9.1      30 Sep 2023 00:40  Phos  2.6     09-30  Mg     2.20     09-30    TPro  6.0  /  Alb  2.3<L>  /  TBili  0.3  /  DBili  x   /  AST  23  /  ALT  9   /  AlkPhos  306<H>  09-30    LIVER FUNCTIONS - ( 30 Sep 2023 00:40 )  Alb: 2.3 g/dL / Pro: 6.0 g/dL / ALK PHOS: 306 U/L / ALT: 9 U/L / AST: 23 U/L / GGT: x           PTT - ( 30 Sep 2023 00:40 )  PTT:65.4 sec        Urinalysis Basic - ( 30 Sep 2023 00:40 )    Color: x / Appearance: x / SG: x / pH: x  Gluc: 256 mg/dL / Ketone: x  / Bili: x / Urobili: x   Blood: x / Protein: x / Nitrite: x   Leuk Esterase: x / RBC: x / WBC x   Sq Epi: x / Non Sq Epi: x / Bacteria: x      POCT Blood Glucose.: 237 mg/dL *H* (09-30-23 @ 05:38)  POCT Blood Glucose.: 251 mg/dL *H* (09-29-23 @ 17:23)  POCT Blood Glucose.: 272 mg/dL *H* (09-29-23 @ 12:05)        MEDICATIONS  (STANDING):  acetaminophen   IVPB .. 1000 milliGRAM(s) IV Intermittent once  acetic acid 0.25% Topical Irrigation 1 Application(s) Topical two times a day  albuterol/ipratropium for Nebulization 3 milliLiter(s) Nebulizer every 6 hours  artificial tears (preservative free) Ophthalmic Solution 1 Drop(s) Right EYE every 2 hours  aspirin  chewable 81 milliGRAM(s) Enteral Tube daily  atorvastatin 40 milliGRAM(s) Oral at bedtime  buMETAnide Injectable 2 milliGRAM(s) IV Push <User Schedule>  chlorhexidine 0.12% Liquid 15 milliLiter(s) Oral Mucosa every 12 hours  chlorhexidine 2% Cloths 1 Application(s) Topical daily  chlorhexidine 4% Liquid 1 Application(s) Topical <User Schedule>  DAPTOmycin IVPB 500 milliGRAM(s) IV Intermittent every 48 hours  dextrose 5%. 1000 milliLiter(s) (50 mL/Hr) IV Continuous <Continuous>  dextrose 5%. 1000 milliLiter(s) (100 mL/Hr) IV Continuous <Continuous>  dextrose 50% Injectable 12.5 Gram(s) IV Push once  dextrose 50% Injectable 25 Gram(s) IV Push once  dextrose 50% Injectable 25 Gram(s) IV Push once  epoetin odalis (EPOGEN) Injectable 10117 Unit(s) SubCutaneous <User Schedule>  erythromycin   Ointment 1 Application(s) Left EYE four times a day  ferrous    sulfate Liquid 300 milliGRAM(s) Oral daily  glucagon  Injectable 1 milliGRAM(s) IntraMuscular once  heparin  Infusion 1050 Unit(s)/Hr (10 mL/Hr) IV Continuous <Continuous>  hydrocortisone sodium succinate Injectable 25 milliGRAM(s) IV Push every 8 hours  insulin lispro (ADMELOG) corrective regimen sliding scale   SubCutaneous every 6 hours  insulin NPH human recombinant 11 Unit(s) SubCutaneous every 6 hours  meropenem  IVPB 500 milliGRAM(s) IV Intermittent every 24 hours  midodrine. 30 milliGRAM(s) Oral every 8 hours  mupirocin 2% Ointment 1 Application(s) Topical two times a day  ofloxacin 0.3% Solution 1 Drop(s) Left EYE every 2 hours  pantoprazole  Injectable 40 milliGRAM(s) IV Push two times a day  petrolatum Ophthalmic Ointment 1 Application(s) Right EYE two times a day  psyllium Powder 1 Packet(s) Oral daily  sodium chloride 2 Gram(s) Oral two times a day  valACYclovir 500 milliGRAM(s) Oral every 24 hours    MEDICATIONS  (PRN):  acetaminophen   Oral Liquid .. 650 milliGRAM(s) Oral every 6 hours PRN Temp greater or equal to 38C (100.4F), Mild Pain (1 - 3)  artificial tears (preservative free) Ophthalmic Solution 1 Drop(s) Both EYES three times a day PRN Dry Eyes  calamine/zinc oxide Lotion 1 Application(s) Topical two times a day PRN Rash and/or Itching  dextrose Oral Gel 15 Gram(s) Oral once PRN Blood Glucose LESS THAN 70 milliGRAM(s)/deciliter  sodium chloride 0.9% lock flush 10 milliLiter(s) IV Push every 1 hour PRN Pre/post blood products, medications, blood draw, and to maintain line patency      Allergies:  isoniazid (Rash)  nafcillin (Unknown)  hydrALAZINE (Rash)  vitamin E (Short breath; Urticaria; Hives)  doxycycline (Rash)  cefepime (Rash)  NIFEdipine (Urticaria; Hives)                  Significant recent/past 24 hr events: pt tolerates HD without vasopressors requirements,   -- pt failed PS trials -- tachypnic, high peak pressures, low TV's      Subjective: Pt is unable to provide any history in s/o multiple strokes/ metabolic encephalopathy    Review of Systems         [ ] A ten-point review of systems was otherwise negative except as noted.  [ ] Due to altered mental status/intubation, subjective information were not able to be obtained from the patient. History was obtained, to the extent possible, from review of the chart and collateral sources of information.      Patient is a 73y old  Female who presents with a chief complaint of Respiratory distress (29 Sep 2023 17:59)    HPI:  74 y/o F DM on insulin, HTN, CKD,breast CA, respiratory arrest and cardiac arrest (2018), CVA with residual weakness, aspiration PNA h/o trache, PEG, both removed presents with respiratory distress requiring intubation. Had recent admission d/c 7/22/23 for cough and respiratory distress (no intubation) thought to be i/s/o aspiration PNA s/p cefepime course; developed rash in response. Infectious w/u was negative at this time. Was discharged home, daughter and  at bedside providing history.     Reports that she was initially improving with O2 sats in the high 90s on room air, however, then became more lethargic and not herself (baseline mental status AOx3). Also had worsening shortness of breath while laying down and leg swelling. Contacted cardiologist who started her on bumex 1mg daily. Developed low sugars overnight before admission and was given juice with some food, daughter reports no coughing during episode. At around 4-5 AM developed vomiting and coughing, O2 sats dropped to 80s and EMS was called. Denies fevers/chills, dysuria or urinary frequency, chest pain, palpitations. Uses wheelchair at home however family moves her around frequently.     In ED, was on BiPAP with increased WOB and was intubated. Found to have elevated pro-BNP and CO2 of 111 on VBG. CXR with small right pleural effusion, started on vanc in the ED.  (11 Aug 2023 09:08)    PAST MEDICAL & SURGICAL HISTORY:  Breast CA      Diabetes      Stroke      Cardiac arrest      HTN (hypertension)      H/O mastectomy, bilateral        FAMILY HISTORY:  No pertinent family history in first degree relatives        Vitals   ICU Vital Signs Last 24 Hrs  T(C): 37.8 (30 Sep 2023 04:00), Max: 38.1 (29 Sep 2023 08:00)  T(F): 100 (30 Sep 2023 04:00), Max: 100.5 (29 Sep 2023 08:00)  HR: 117 (30 Sep 2023 05:00) (95 - 118)  BP: 119/50 (30 Sep 2023 05:00) (93/74 - 135/49)  BP(mean): 67 (30 Sep 2023 05:00) (45 - 78)  ABP: --  ABP(mean): --  RR: 22 (30 Sep 2023 05:00) (18 - 25)  SpO2: 99% (30 Sep 2023 05:00) (93% - 100%)    O2 Parameters below as of 30 Sep 2023 05:00  Patient On (Oxygen Delivery Method): ventilator    O2 Concentration (%): 40        Physical Exam:   Constitutional: NAD, well-groomed, well-developed  HEENT: PERRLA, EOMI, no drainage or redness  Neck: supple,  No JVD, Trachea midline  Back: Normal spine flexure, No CVA tenderness, No deformity or limitation of movement  Respiratory: Breath Sounds equal & clear bilaterally to auscultation, no accessory muscle use noted  Cardiovascular: Regular rate, regular rhythm, normal S1, S2; no murmurs or rub  Gastrointestinal: Soft, non-tender, non distended, no hepatosplenomegaly, normal bowel sounds  Extremities: + peripheral edema, no cyanosis, no clubbing   Vascular: Equal and normal pulses: 2+ peripheral pulses throughout  Neurological: opens eyes spontaneously, not following commands, not responding to painful stimuli, no sensory, motor  deficits, normal reflexes  Psychiatric: calm, normal affect  Musculoskeletal: No joint swelling or deformity; no limitation of movement  Skin: warm, dry, well perfused, no rashes    VENT SETTINGS   Mode: AC/ CMV (Assist Control/ Continuous Mandatory Ventilation)  RR (machine): 22  TV (machine): 340  FiO2: 40  PEEP: 5  ITime: 0.78  MAP: 18  PIP: 50        I&O's Detail    29 Sep 2023 07:01  -  30 Sep 2023 07:00  --------------------------------------------------------  IN:    Enteral Tube Flush: 50 mL    Heparin: 180 mL    IV PiggyBack: 50 mL    Nepro with Carb Steady: 720 mL  Total IN: 1000 mL    OUT:    Indwelling Catheter - Urethral (mL): 30 mL  Total OUT: 30 mL    Total NET: 970 mL          LABS                        7.2    8.30  )-----------( 305      ( 30 Sep 2023 00:40 )             22.6     09-30    130<L>  |  95<L>  |  64<H>  ----------------------------<  256<H>  3.6   |  26  |  1.99<H>    Ca    9.1      30 Sep 2023 00:40  Phos  2.6     09-30  Mg     2.20     09-30    TPro  6.0  /  Alb  2.3<L>  /  TBili  0.3  /  DBili  x   /  AST  23  /  ALT  9   /  AlkPhos  306<H>  09-30    LIVER FUNCTIONS - ( 30 Sep 2023 00:40 )  Alb: 2.3 g/dL / Pro: 6.0 g/dL / ALK PHOS: 306 U/L / ALT: 9 U/L / AST: 23 U/L / GGT: x           PTT - ( 30 Sep 2023 00:40 )  PTT:65.4 sec        Urinalysis Basic - ( 30 Sep 2023 00:40 )    Color: x / Appearance: x / SG: x / pH: x  Gluc: 256 mg/dL / Ketone: x  / Bili: x / Urobili: x   Blood: x / Protein: x / Nitrite: x   Leuk Esterase: x / RBC: x / WBC x   Sq Epi: x / Non Sq Epi: x / Bacteria: x      POCT Blood Glucose.: 237 mg/dL *H* (09-30-23 @ 05:38)  POCT Blood Glucose.: 251 mg/dL *H* (09-29-23 @ 17:23)  POCT Blood Glucose.: 272 mg/dL *H* (09-29-23 @ 12:05)        MEDICATIONS  (STANDING):  acetaminophen   IVPB .. 1000 milliGRAM(s) IV Intermittent once  acetic acid 0.25% Topical Irrigation 1 Application(s) Topical two times a day  albuterol/ipratropium for Nebulization 3 milliLiter(s) Nebulizer every 6 hours  artificial tears (preservative free) Ophthalmic Solution 1 Drop(s) Right EYE every 2 hours  aspirin  chewable 81 milliGRAM(s) Enteral Tube daily  atorvastatin 40 milliGRAM(s) Oral at bedtime  buMETAnide Injectable 2 milliGRAM(s) IV Push <User Schedule>  chlorhexidine 0.12% Liquid 15 milliLiter(s) Oral Mucosa every 12 hours  chlorhexidine 2% Cloths 1 Application(s) Topical daily  chlorhexidine 4% Liquid 1 Application(s) Topical <User Schedule>  DAPTOmycin IVPB 500 milliGRAM(s) IV Intermittent every 48 hours  dextrose 5%. 1000 milliLiter(s) (50 mL/Hr) IV Continuous <Continuous>  dextrose 5%. 1000 milliLiter(s) (100 mL/Hr) IV Continuous <Continuous>  dextrose 50% Injectable 12.5 Gram(s) IV Push once  dextrose 50% Injectable 25 Gram(s) IV Push once  dextrose 50% Injectable 25 Gram(s) IV Push once  epoetin odalis (EPOGEN) Injectable 82566 Unit(s) SubCutaneous <User Schedule>  erythromycin   Ointment 1 Application(s) Left EYE four times a day  ferrous    sulfate Liquid 300 milliGRAM(s) Oral daily  glucagon  Injectable 1 milliGRAM(s) IntraMuscular once  heparin  Infusion 1050 Unit(s)/Hr (10 mL/Hr) IV Continuous <Continuous>  hydrocortisone sodium succinate Injectable 25 milliGRAM(s) IV Push every 8 hours  insulin lispro (ADMELOG) corrective regimen sliding scale   SubCutaneous every 6 hours  insulin NPH human recombinant 11 Unit(s) SubCutaneous every 6 hours  meropenem  IVPB 500 milliGRAM(s) IV Intermittent every 24 hours  midodrine. 30 milliGRAM(s) Oral every 8 hours  mupirocin 2% Ointment 1 Application(s) Topical two times a day  ofloxacin 0.3% Solution 1 Drop(s) Left EYE every 2 hours  pantoprazole  Injectable 40 milliGRAM(s) IV Push two times a day  petrolatum Ophthalmic Ointment 1 Application(s) Right EYE two times a day  psyllium Powder 1 Packet(s) Oral daily  sodium chloride 2 Gram(s) Oral two times a day  valACYclovir 500 milliGRAM(s) Oral every 24 hours    MEDICATIONS  (PRN):  acetaminophen   Oral Liquid .. 650 milliGRAM(s) Oral every 6 hours PRN Temp greater or equal to 38C (100.4F), Mild Pain (1 - 3)  artificial tears (preservative free) Ophthalmic Solution 1 Drop(s) Both EYES three times a day PRN Dry Eyes  calamine/zinc oxide Lotion 1 Application(s) Topical two times a day PRN Rash and/or Itching  dextrose Oral Gel 15 Gram(s) Oral once PRN Blood Glucose LESS THAN 70 milliGRAM(s)/deciliter  sodium chloride 0.9% lock flush 10 milliLiter(s) IV Push every 1 hour PRN Pre/post blood products, medications, blood draw, and to maintain line patency      Allergies:  isoniazid (Rash)  nafcillin (Unknown)  hydrALAZINE (Rash)  vitamin E (Short breath; Urticaria; Hives)  doxycycline (Rash)  cefepime (Rash)  NIFEdipine (Urticaria; Hives)

## 2023-09-30 NOTE — PROGRESS NOTE ADULT - ASSESSMENT
trached to vent  NAD  s/p HD 9/28  afebrile  Due for HD today  Next HD Tuesday  updated   at bedside    acetaminophen   IVPB .. 1000 milliGRAM(s) IV Intermittent once  acetaminophen   Oral Liquid .. 650 milliGRAM(s) Oral every 6 hours PRN  acetic acid 0.25% Topical Irrigation 1 Application(s) Topical two times a day  albuterol/ipratropium for Nebulization 3 milliLiter(s) Nebulizer every 6 hours  artificial tears (preservative free) Ophthalmic Solution 1 Drop(s) Both EYES three times a day PRN  artificial tears (preservative free) Ophthalmic Solution 1 Drop(s) Right EYE every 2 hours  aspirin  chewable 81 milliGRAM(s) Enteral Tube daily  atorvastatin 40 milliGRAM(s) Oral at bedtime  buMETAnide Injectable 2 milliGRAM(s) IV Push <User Schedule>  calamine/zinc oxide Lotion 1 Application(s) Topical two times a day PRN  chlorhexidine 0.12% Liquid 15 milliLiter(s) Oral Mucosa every 12 hours  chlorhexidine 2% Cloths 1 Application(s) Topical daily  DAPTOmycin IVPB 500 milliGRAM(s) IV Intermittent every 48 hours  dextrose 5%. 1000 milliLiter(s) IV Continuous <Continuous>  dextrose 5%. 1000 milliLiter(s) IV Continuous <Continuous>  dextrose 50% Injectable 12.5 Gram(s) IV Push once  dextrose 50% Injectable 25 Gram(s) IV Push once  dextrose 50% Injectable 25 Gram(s) IV Push once  dextrose Oral Gel 15 Gram(s) Oral once PRN  epoetin odalis (EPOGEN) Injectable 35712 Unit(s) SubCutaneous <User Schedule>  erythromycin   Ointment 1 Application(s) Left EYE four times a day  ferrous    sulfate Liquid 300 milliGRAM(s) Oral daily  glucagon  Injectable 1 milliGRAM(s) IntraMuscular once  heparin  Infusion 1050 Unit(s)/Hr IV Continuous <Continuous>  hydrocortisone sodium succinate Injectable 25 milliGRAM(s) IV Push every 8 hours  insulin lispro (ADMELOG) corrective regimen sliding scale   SubCutaneous every 6 hours  insulin NPH human recombinant 18 Unit(s) SubCutaneous every 6 hours  meropenem  IVPB 500 milliGRAM(s) IV Intermittent every 24 hours  midodrine. 30 milliGRAM(s) Oral every 8 hours  mupirocin 2% Ointment 1 Application(s) Topical two times a day  ofloxacin 0.3% Solution 1 Drop(s) Left EYE every 2 hours  pantoprazole  Injectable 40 milliGRAM(s) IV Push two times a day  petrolatum Ophthalmic Ointment 1 Application(s) Right EYE two times a day  psyllium Powder 1 Packet(s) Oral daily  sodium chloride 2 Gram(s) Oral two times a day  sodium chloride 0.9% lock flush 10 milliLiter(s) IV Push every 1 hour PRN  valACYclovir 500 milliGRAM(s) Oral every 24 hours      VITAL:  T(C): , Max: 37.8 (09-30-23 @ 04:00)  T(F): , Max: 100 (09-30-23 @ 04:00)  HR: 111 (09-30-23 @ 11:16)  BP: 113/55 (09-30-23 @ 10:00)  BP(mean): 66 (09-30-23 @ 10:00)  RR: 22 (09-30-23 @ 10:00)  SpO2: 99% (09-30-23 @ 11:16)  Wt(kg): --    09-29-23 @ 07:01  -  09-30-23 @ 07:00  --------------------------------------------------------  IN: 1300 mL / OUT: 30 mL / NET: 1270 mL    09-30-23 @ 07:01  -  09-30-23 @ 11:32  --------------------------------------------------------  IN: 180 mL / OUT: 0 mL / NET: 180 mL        PHYSICAL EXAM:  Constitutional: sedated  HEENT: trach-vent, (+)NGT  Respiratory: coarse BS b/l  Cardiovascular: tachy reg s1s2  Gastrointestinal: BS+, soft, NT/ND  Extremities: + peripheral edema  Neurological: reduced generalized strength   : (+) Wheeler  Skin: No rashes  Access: (+)[facility]    LABS:                          7.2    8.30  )-----------( 305      ( 30 Sep 2023 00:40 )             22.6     Na(130)/K(3.6)/Cl(95)/HCO3(26)/BUN(64)/Cr(1.99)Glu(256)/Ca(9.1)/Mg(2.20)/PO4(2.6)    09-30 @ 00:40  Na(129)/K(3.6)/Cl(91)/HCO3(25)/BUN(55)/Cr(1.78)Glu(263)/Ca(9.1)/Mg(2.20)/PO4(2.8)    09-29 @ 03:21  Na(131)/K(4.2)/Cl(93)/HCO3(23)/BUN(82)/Cr(2.36)Glu(255)/Ca(9.4)/Mg(2.30)/PO4(3.6)    09-28 @ 02:15  Na(132)/K(4.1)/Cl(95)/HCO3(25)/BUN(78)/Cr(2.25)Glu(228)/Ca(9.1)/Mg(2.40)/PO4(3.8)    09-27 @ 23:30    Urinalysis Basic - ( 30 Sep 2023 00:40 )    Color: x / Appearance: x / SG: x / pH: x  Gluc: 256 mg/dL / Ketone: x  / Bili: x / Urobili: x   Blood: x / Protein: x / Nitrite: x   Leuk Esterase: x / RBC: x / WBC x   Sq Epi: x / Non Sq Epi: x / Bacteria: x            ASSESSMENT/PLAN    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

## 2023-10-01 NOTE — PROGRESS NOTE ADULT - ASSESSMENT
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 While in MICU patient noted with worsening renal failure requiring HD initiation, CGE/ Melena s/p EGD 8/31 found with esophagitis and erosive gastropathy, new right cerebellar infarct and fevers second to ESBL COLI and MSSA VAP, MSSA bacteremia, left ear otitis externa and drug rxn to cephalosporins Course further complicated by prolonged vent time s/p tracheostomy 9/1 and transferred to RCU 9/3 completed by recurrent fevers with high PIP, respiratory acidosis and concern for volume overloaded.     NEUROLOGY  # Metabolic Encephalopath vs NEW cerebella infarct   - Baseline MS AOX3 with short term memory changes per family however noted with concern for poor mentation in ICU  - MRI (8/17) with NEW right cerebellar infarct and old left PCA/occipital infarcts;  - STEPHANIE (8/17) with AV showing two linear mobile echogenic elements attached to valve leaflets consistent with Lambel's excrescence, but no TRINITY thrombus, and lambel's excrescence with uncertain clinical implication.   - EEG (8/11-8/15) with no seizures   - ILR interrogation (9/7) with SR and PACs falsely recorded as AF.   - Attempted to resume ASA for medical management but held given GIB   - Continue Lipitor   - Course complicated by questionable head and body shaking 9/8 prolactin elevated, however was shaking head yes/no to some questions.   - rCTH 9/26 for worsening encephalopathy-  Vague questionable areas of hypoattenuation within the bilateral cerebellar hemispheres which may be compatible with acute/subacute ischemia. Recommend further evaluation with a brain MRI study, provided there are no MRI contraindications. No acute intracranial hemorrhage. Previously seen questionable vague wedge-shaped area of hypoattenuation in the left parietal lobe with artifactual secondary to motion and volume averaging with a prominent sulcus. Chronic left occipital lobe infarct.  - neuro consulted- reccs: New CTH from 9/26 without any evidence of acute infarct -> can consider repeat CTH down the line in mental status does not improve with treatment of underlying metabolic derangements and infections.  -- ECHO (9/6) unable to rule out endocarditis, repeat TTE to evaluate to endocarditis   -- started on ASA 81 mg  __ bleeding resolved and pt has not been transfused since 9/19     CARDIOVASCULAR  # HTN Urgency   # Septic vs vasoplegic shock   - weaned off levophed on 9/28 at 7:55 am, required 0.01 Levophed yesterday with HD on 9/28,   -- repeat HD today --tolerated without any pressors     - Holding Labetalol, Catapres , Cardura   - started stress does steroids (9/27 - Solu_Cortef 50 TID, tapered to 25 mg TID (9/29)  -- discontinue Bumex, there is no improvement in UOP   -- - last PRBC transfusion on 9/19 -- started ASA on 9/29    # Hx of HFpEF with likely ADHF with volume overload.   - ECHO (7/2023) with EF 59 with severe LVH and diastolic dysfunction   - STEPHANIE (8/18) with normal LVRVSF however noted with increased LA pressures and persistent LA septal bowing into RA.   - ECHO (8/11) with EF 60 with mild LVH, normal LVRVSF, and mild ddfxn.  - Remove volume with HD sessions and intermittent bumex pushes as BP allows    - discontinue Bumex 2mg --  UOP is minimal  and cont HD sessions    HEENT   # Left ear OE   - ENT evaluation (9/7) with no mastoid tenderness, however found with positive swelling and excoriation to left auricle and tenderness to manipulation of auricle. Debrided crusting of auricle and EAC evaluated with edema and unable to visualize TM. EAC debrided and found with watery exudate.   - s/p CiproHC (9/5 - 9/16)   - Ana Cristina discontinued. Rash on torso concerning for possible drug rash  - ENT following and ear wick change QD, Wick was removed  - ENT recommending CT IAC w/ IV con but family is refusing as concerned given CKD, kidneys wouldn't recover from IV contrast. Spoke with Nephrology, ENT, and patient's  at length. Planned for CT non con and per , decision will be made from there but for now doesn't want to risk further harming kidneys.  - CT IAC showing acute on chronic left-sided otitis externa and acute on chronic left-sided otomastoiditis. Superimposed left-sided tympanosclerosis. Superimposed cholesteatoma formation within the left tympanomastoid cavity cannot be excluded. No evidence of malignant otitis externa.  - ENT consulted (9/20) for white purulent discharge from left ear, recc: ENT: acetic acid drops BID to left ear. Mupirocin ointment to the left pinna BID, cover with xeroform after placing mupirocin ointment. Pressure offloading of the left ear. No plan for any surgical intervention from ENT at this time, ENT will sign off    # + L eye redness in setting of surrounding infectious process - Ophthalmology following, see in ID ,   -- L eye __ possible endophthalmitis, possible HSV  -- Continue Ofloxacin 1 drop left eye 6 times a day (ordered by ophtho)  - Continue erythromycin ointment 4 times a day in the left eye (ordered by ophtho)  - PLEASE TAPE LEFT EYE SHUT AT ALL TIMES (can remove tape when administering eye drops)  - Continue preservative free artificial tears 4 times a day in the right eye (ordered by ophtho)  - Continue Lacrilube ointment twice a day in the right eye     # Oral Lesions with questionable Zoster vs Trauma?   - Patient with tongue pain and seen with anterior ulcerations consolidating and crusting and posterior ulceration.  - Case discussed with pathology resident and culture/ cytology sent.   - HSV negative and Path (9/6) with normal appearing epithelial cells singly and in clusters devoid of viral cytopathic effect.   - Continue on magic mouth wash for pain    RESPIRATORY  # Acute hypoxic/hypercapnic Respiratory failure likely iso volume overload   - Hx of CKD and HFpEF presented with SOB and hypercarbia second to volume overload requiring intubation and HD initiation   - Vent weaning attempted however limited requiring tracheostomy (9/1) with IP   - Course complicated by PIPs elevated (50s) and respiratory acidosis second to secretions vs volume ?    - Vent settings: 22/340/8/40%,   -- VBG 7.35/49. minimal secretions   - POCUS (9/8) with BL pleural effusions (large on right and small on left)   - CT CHEST (9/8) with TBM, BL pleural effusions and continued consolidations   - (9/27) Over the past 24 hrs, pt with increasing O2 requirements and respiratory acidosis with poor TVs. Bedside bronchoscopy (+) severe dynamic airway collapse into distal end of tracheostomy extending to main ines and RM bronchus.  tracheostomy exchanged at bedside to size 6dXLT with repeat bronchoscopy showing some but not complete improvement in dynamic collapse.   - Continue volume removal with HD   - Continue on Duoneb for airway clearance        GI  # UGIB   - CGE x 1 episode on 8/24 and melena x 2 episodes on 8/31 likely residual blood.   - EGD (8/31) found with esophagitis and erosive gastropathy  - last PRBC transfusion on 9/19   - Continue on PPI BID through late October and then PPI QD   - No melena     # Dysphagia   - NGT-TF continued, changed to R nostril today given infections on L side  - Pending PEG however needs improvement in infectious status prior to placement.   -- pt is tolerating TF, + BM on 9/28,   --restarted BR     RENAL  # Progressive CKD   - Presented volume overload and progressive RUSS on CKD (baseline CRE in 2s and mode into the 3s on admission) likely obstruction vs low flow state with shock vs AIN from drug reaction issue?  - POCUS with no signs of hydronephrosis   - s/p Pressor support started with improvement in renal function  - Attempted steroid course in ICU without improvement in renal function   - HD inititated in ICU for volume overload, held 9/20 iso malfunctioning shiley and stable labs/vol status   - iso worsening hypoxia/respiratory acidosis, L IJ shiley placed 9/27 and HD restarted. s/p 1.3L fluid removal 9/27.   -- HD on 9/27-- neg 1.3 L net and 9/28 with 1.8 L net neg, pt voided 195 over past 24 hrs, with 10-5 cc/ voiding per hr and UOP for LOS is + 23 L, and for 24 hrs is pos 1.2 L, s/p repeat HD session-- with 2 L net negative .   - renal following  - Monitor renal function and UOP, and electrolytes   -- willard placed on ICU admission, __ d/cd willard yesterday, did not void, UOP has been minimal, _ bladder scan and no willard is indicated       Hyponatremia   - c/w Salt tabs - s/p Hypertonic 1.5%NS @ 50cc/hr x 5 hr;  --as per nephrology, Hyponatremia - low but acceptable, on NaCl tabs + intermittent HD    # Hyperphosphatemia (resolved)   - PTH normal and elevated phos likely from renal failure  - s/p Phos binder with Renvela - now d'beth as level wnl      INFECTIOUS DISEASE  # ESBL ECOLI and MSSA VAP c/b MSSA bacteremia   - Course complicated by recurrent fevers, T max 100 today and 101.1 yesterday, last temp prior -- 100.5 on 9/20, WBC 8   - RVP/ COVID (8/11) negative   - SCx (8/11) with MSSA s/p cefepime (8/12-8/13) and then ancef (8/14) however noted with drug reaction and then changed to vanco and aztreonam (8/12-8/13) in ICU .   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  - s/p Ana Cristina (9/4 - 9/22 ) and Vanco by level (last dose 9/22 ) , pt noted w/ new erythematous patchy rash, did not improve after discontinuing the ana cristina, so vanco switched to changed to Daptomycin per ID recs (9/26-), monitor CK's  - s/p fluconazole 200 qd (9/219/28) Dc'd per ID recs given family states rash appeared post fluconazole initiation  - BCx 9/26- NTD, Urine Cx 9/26- NTD, MRSA pcr+ staph  -- recurrent F with 101.1 on 9/29 __ repeat blood cxs sent- -on 9/29 -- follow up, sputum cx sent  -Continue Daptomycin (plan for 4 weeks through 10/2)  -Sputum 9/29 Cx with pseudomonas/proteus  -- as per discussion with ID, added meropenem empirically (9/29 -  ) and started on Valtrex (9/29- ) as per ophthalmology recs (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 Debrided crusting of auricle and EAC evaluated with edema and unable to visualize TM. EAC debrided and found with watery exudate.   - s/p CiproHC (9/5 - 9/16)   -- ENT signed off     # MRSA PCRs   - MRSA PCR (8/11 and 8/14) negative. 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   - Spoke with GI for clearance to start Heparin gtt for + DVT   - c/w Heparin gtt (9/21-), aPTT are therapeutic-- 62>65, pt's specific-- aPTT goal 60-85, aPTT daily    Vascular  # DVT  - Pt with + popliteal DVT on SCD Spoke with GI no absolute contraindication for starting Heparin - advise close monitoring for bleeding - Do stat CTA if bleeding occurs and call IR   - c/w Heparin gtt (9/21-)    ONC   # Hx of Breast CA   - Patient dx in 2018 and s/p BL mastectomy (radical on right) and chemo and RT.   - Supportive care.     ENDOCRINE  # IDDM2   - Monitor FS and adjust as needed   - started stress dose steroids 9/27, tepering down to 25 mg TID today, monitor FS -- 251>237   - -increased NPH to 18 U q 6 hrs, continue ISS     LINES/ TUBES  - R ARTHUR TAYLOR (8/19 - 9/21)   - L ARTHUR taylor (9/27 - )  -- PIV's     SKIN  # Drug Eruption   - Attempted cefepime (8/12) vs ancef (8/13-8/14) and then noted with drug rxn.   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 While in MICU patient noted with worsening renal failure requiring HD initiation, CGE/ Melena s/p EGD 8/31 found with esophagitis and erosive gastropathy, new right cerebellar infarct and fevers second to ESBL COLI and MSSA VAP, MSSA bacteremia, left ear otitis externa and drug rxn to cephalosporins Course further complicated by prolonged vent time s/p tracheostomy 9/1 and transferred to RCU 9/3 completed by recurrent fevers with high PIP, respiratory acidosis and concern for volume overloaded.     NEUROLOGY  # Metabolic Encephalopath vs NEW cerebella infarct   - Baseline MS AOX3 with short term memory changes per family however noted with concern for poor mentation in ICU  -# L PCA stroke, ESUS s/p ILR, also with bilateral centrum semiovale watershed infarcts   Multiple embolic strokes on MRI 8/17 - R cerebellum, midbrain, multiple other tiny embolic infarct. Neuro- doubt new stroke.  - STEPHANIE (8/17) with AV showing two linear mobile echogenic elements attached to valve leaflets consistent with Lambel's excrescence, but no TRINITY thrombus, and lambel's excrescence with uncertain clinical implication.   - EEG (8/11-8/15) with no seizures, ammonia 14    - ILR interrogation (9/7) with SR and PACs falsely recorded as AF.   - 9/29 -- resumed ASA for medical management (prior held given GIB,  bleeding resolved and pt has not been transfused since 9/19)  - Continue Lipitor   - Course complicated by questionable head and body shaking 9/8 prolactin elevated, however was shaking head yes/no to some questions.   - [facility] 9/26 for worsening encephalopathy-  Vague questionable areas of hypoattenuation within the bilateral cerebellar hemispheres which may be compatible with acute/subacute ischemia. Recommend further evaluation with a brain MRI study, provided there are no MRI contraindications. No acute intracranial hemorrhage. Previously seen questionable vague wedge-shaped area of hypoattenuation in the left parietal lobe with artifactual secondary to motion and volume averaging with a prominent sulcus. Chronic left occipital lobe infarct.  - neuro consulted- reccs: New CTH from 9/26 without any evidence of acute infarct -> can consider repeat CTH down the line in mental status does not improve with treatment of underlying metabolic derangements and infections.  -- ECHO (9/6) unable to rule out endocarditis, repeat TTE to evaluate to endocarditis   -- Neurology following     CARDIOVASCULAR  # HTN Urgency   # Septic vs vasoplegic shock   - weaned off levophed on 9/28 at 7:55 am, required 0.01 Levophed with HD on 9/28, tolerated HD yesterday without vasopressors requirements,   - Holding Labetalol, Catapres , Cardura   - started stress does steroids (9/27 - Solu-Cortef 50 TID, tapered to 25 mg TID (9/29), taper tomorrow to 25 daily x 3 days  -- discontinued Bumex, there was no improvement in UOP   -- - last PRBC transfusion on 9/19 -- started ASA on 9/29 for medical mgmt (multiple strokes)  -- continue Midodrine 30 TID and taper as able     # Hx of HFpEF with likely ADHF with volume overload.   - ECHO (7/2023) with EF 59 with severe LVH and diastolic dysfunction   - STEPHANIE (8/18) with normal LVRVSF however noted with increased LA pressures and persistent LA septal bowing into RA.   - ECHO (8/11) with EF 60 with mild LVH, normal LVRVSF, and mild ddfxn.      HEENT   # Left ear OE   - ENT evaluation (9/7) with no mastoid tenderness, however found with positive swelling and excoriation to left auricle and tenderness to manipulation of auricle. Debrided crusting of auricle and EAC evaluated with edema and unable to visualize TM. EAC debrided and found with watery exudate.   - s/p CiproHC (9/5 - 9/16)   - Ana Cristina discontinued. Rash on torso concerning for possible drug rash  - ENT following and ear wick change QD, Wick was removed,  - ENT recommending CT IAC w/ IV con but family is refusing as concerned given CKD, kidneys wouldn't recover from IV contrast. Spoke with Nephrology, ENT, and patient's  at length. Planned for CT non con and per , decision will be made from there but for now doesn't want to risk further harming kidneys.  - CT IAC showing acute on chronic left-sided otitis externa and acute on chronic left-sided otomastoiditis. Superimposed left-sided tympanosclerosis. Superimposed cholesteatoma formation within the left tympanomastoid cavity cannot be excluded. No evidence of malignant otitis externa.  - ENT consulted (9/20) for white purulent discharge from left ear, recc: ENT: acetic acid drops BID to left ear. Mupirocin ointment to the left pinna BID, cover with xeroform after placing mupirocin ointment. Pressure offloading of the left ear. No plan for any surgical intervention from ENT at this time, ENT will sign off    # + L eye redness in setting of surrounding infectious process - Ophthalmology following, see in ID ,   -- L eye __ possible endophthalmitis vs HSV keratitis  -- Continue Ofloxacin 1 drop left eye 6 times a day (ordered by ophtho)  - Continue erythromycin ointment 4 times a day in the left eye (ordered by ophtho)  - PLEASE TAPE LEFT EYE SHUT AT ALL TIMES (can remove tape when administering eye drops)  - Continue preservative free artificial tears 4 times a day in the right eye (ordered by ophtho)  - Continue Lacrilube ointment twice a day in the right eye     # Oral Lesions with questionable Zoster vs Trauma?   - Patient with tongue pain and seen with anterior ulcerations consolidating and crusting and posterior ulceration.  - Case discussed with pathology resident and culture/ cytology sent.   - HSV negative and Path (9/6) with normal appearing epithelial cells singly and in clusters devoid of viral cytopathic effect.   - Continue on magic mouth wash for pain    RESPIRATORY  # Acute hypoxic/hypercapnic Respiratory failure likely iso volume overload   - Hx of CKD and HFpEF presented with SOB and hypercarbia second to volume overload requiring intubation and HD initiation   - Vent weaning attempted however limited requiring tracheostomy (9/1) with IP   - Course complicated by PIPs elevated (50s) and respiratory acidosis second to secretions vs volume ?    - Vent settings: 22/340/8/40%,   -- VBG 7.35/49. minimal secretions   - POCUS (9/8) with BL pleural effusions (large on right and small on left)   - CT CHEST (9/8) with TBM, BL pleural effusions and continued consolidations   - (9/27) Over the past 24 hrs, pt with increasing O2 requirements and respiratory acidosis with poor TVs. Bedside bronchoscopy (+) severe dynamic airway collapse into distal end of tracheostomy extending to main ines and RM bronchus.  tracheostomy exchanged at bedside to size 6dXLT with repeat bronchoscopy showing some but not complete improvement in dynamic collapse.   - Continue volume removal with HD   - Continue on Duoneb for airway clearance        GI  # UGIB   - CGE x 1 episode on 8/24 and melena x 2 episodes on 8/31 likely residual blood.   - EGD (8/31) found with esophagitis and erosive gastropathy  - last PRBC transfusion on 9/19   - Continue on PPI BID through late October and then PPI QD   - No melena     # Dysphagia   - NGT-TF continued, changed to R nostril today given infections on L side  - Pending PEG however needs improvement in infectious status prior to placement.   -- pt is tolerating TF, + BM on 9/28,   --restarted BR     RENAL  # Progressive CKD   - Presented volume overload and progressive RUSS on CKD (baseline CRE in 2s and mode into the 3s on admission) likely obstruction vs low flow state with shock vs AIN from drug reaction issue?  - POCUS with no signs of hydronephrosis   - s/p Pressor support started with improvement in renal function  - Attempted steroid course in ICU without improvement in renal function   - HD inititated in ICU for volume overload, held 9/20 iso malfunctioning shiley and stable labs/vol status   - iso worsening hypoxia/respiratory acidosis, L IJ shiley placed 9/27 and HD restarted. s/p 1.3L fluid removal 9/27.   -- HD on 9/27-- neg 1.3 L net and 9/28 with 1.8 L net neg, pt voided 195 over past 24 hrs, with 10-5 cc/ voiding per hr and UOP for LOS is + 23 L, and for 24 hrs is pos 1.2 L, s/p repeat HD session-- with 2 L net negative .   - renal following  - Monitor renal function and UOP, and electrolytes   -- willard placed on ICU admission, __ d/cd willard yesterday, did not void, UOP has been minimal, _ bladder scan and no willard is indicated       Hyponatremia   - c/w Salt tabs - s/p Hypertonic 1.5%NS @ 50cc/hr x 5 hr;  --as per nephrology, Hyponatremia - low but acceptable, on NaCl tabs + intermittent HD    # Hyperphosphatemia (resolved)   - PTH normal and elevated phos likely from renal failure  - s/p Phos binder with Renvela - now d'beth as level wnl      INFECTIOUS DISEASE  # ESBL ECOLI and MSSA VAP c/b MSSA bacteremia   - Course complicated by recurrent fevers, T max 100 today and 101.1 yesterday, last temp prior -- 100.5 on 9/20, WBC 8   - RVP/ COVID (8/11) negative   - SCx (8/11) with MSSA s/p cefepime (8/12-8/13) and then ancef (8/14) however noted with drug reaction and then changed to vanco and aztreonam (8/12-8/13) in ICU .   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  - s/p Ana Cristina (9/4 - 9/22 ) and Vanco by level (last dose 9/22 ) , pt noted w/ new erythematous patchy rash, did not improve after discontinuing the ana cristina, so vanco switched to changed to Daptomycin per ID recs (9/26-), monitor CK's  - s/p fluconazole 200 qd (9/219/28) Dc'd per ID recs given family states rash appeared post fluconazole initiation  - BCx 9/26- NTD, Urine Cx 9/26- NTD, MRSA pcr+ staph  -- recurrent F with 101.1 on 9/29 __ repeat blood cxs sent- -on 9/29 -- follow up, sputum cx sent  -Continue Daptomycin (plan for 4 weeks through 10/2)  -Sputum 9/29 Cx with pseudomonas/proteus  -- as per discussion with ID, added meropenem empirically (9/29 -  ) and started on Valtrex (9/29- ) as per ophthalmology recs (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 Debrided crusting of auricle and EAC evaluated with edema and unable to visualize TM. EAC debrided and found with watery exudate.   - s/p CiproHC (9/5 - 9/16)   -- ENT signed off     # MRSA PCRs   - MRSA PCR (8/11 and 8/14) negative. 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   - Spoke with GI for clearance to start Heparin gtt for + DVT   - c/w Heparin gtt (9/21-), aPTT are therapeutic-- 62>65, pt's specific-- aPTT goal 60-85, aPTT daily    Vascular  # DVT  - Pt with + popliteal DVT on SCD Spoke with GI no absolute contraindication for starting Heparin - advise close monitoring for bleeding - Do stat CTA if bleeding occurs and call IR   - c/w Heparin gtt (9/21-)    ONC   # Hx of Breast CA   - Patient dx in 2018 and s/p BL mastectomy (radical on right) and chemo and RT.   - Supportive care.     ENDOCRINE  # IDDM2   - Monitor FS and adjust as needed   - started stress dose steroids 9/27, tepering down to 25 mg TID today, monitor FS -- 251>237   - -increased NPH to 18 U q 6 hrs, continue ISS     LINES/ TUBES  - R ARTHUR TAYLOR (8/19 - 9/21)   - L ARTHUR taylor (9/27 - )  -- PIV's     SKIN  # Drug Eruption   - Attempted cefepime (8/12) vs ancef (8/13-8/14) and then noted with drug rxn.   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 While in MICU patient noted with worsening renal failure requiring HD initiation, CGE/ Melena s/p EGD 8/31 found with esophagitis and erosive gastropathy, new right cerebellar infarct and fevers second to ESBL COLI and MSSA VAP, MSSA bacteremia, left ear otitis externa and drug rxn to cephalosporins Course further complicated by prolonged vent time s/p tracheostomy 9/1 and transferred to RCU 9/3 completed by recurrent fevers with high PIP, respiratory acidosis and concern for volume overloaded.     NEUROLOGY  # Metabolic Encephalopath vs NEW cerebella infarct   - Baseline MS AOX3 with short term memory changes per family however noted with concern for poor mentation in ICU  -# L PCA stroke, ESUS s/p ILR, also with bilateral centrum semiovale watershed infarcts   Multiple embolic strokes on MRI 8/17 - R cerebellum, midbrain, multiple other tiny embolic infarct. Neuro- doubt new stroke.  - STEPHANIE (8/17) with AV showing two linear mobile echogenic elements attached to valve leaflets consistent with Lambel's excrescence, but no TRINITY thrombus, and lambel's excrescence with uncertain clinical implication.   - EEG (8/11-8/15) with no seizures, ammonia 14    - ILR interrogation (9/7) with SR and PACs falsely recorded as AF.   - 9/29 -- resumed ASA for medical management (prior held given GIB,  bleeding resolved and pt has not been transfused since 9/19)  - Continue Lipitor   - Course complicated by questionable head and body shaking 9/8 prolactin elevated, however was shaking head yes/no to some questions.   - [facility] 9/26 for worsening encephalopathy-  Vague questionable areas of hypoattenuation within the bilateral cerebellar hemispheres which may be compatible with acute/subacute ischemia. Recommend further evaluation with a brain MRI study, provided there are no MRI contraindications. No acute intracranial hemorrhage. Previously seen questionable vague wedge-shaped area of hypoattenuation in the left parietal lobe with artifactual secondary to motion and volume averaging with a prominent sulcus. Chronic left occipital lobe infarct.  - neuro consulted- reccs: New CTH from 9/26 without any evidence of acute infarct -> can consider repeat CTH down the line in mental status does not improve with treatment of underlying metabolic derangements and infections.  -- ECHO (9/6) unable to rule out endocarditis, repeat TTE to evaluate to endocarditis   -- Neurology following     CARDIOVASCULAR  # HTN Urgency   # Septic vs vasoplegic shock   - weaned off levophed on 9/28 at 7:55 am, required 0.01 Levophed with HD on 9/28, tolerated HD yesterday without vasopressors requirements,   - Holding Labetalol, Catapres , Cardura   -  stress does steroids (9/27 - Solu-Cortef 50 TID, tapered to 25 mg TID (9/29), taper tomorrow to 25 daily x 3 days  -- discontinued Bumex, there was no improvement in UOP   -- - last PRBC transfusion on 9/19 -- started ASA on 9/29 for medical mgmt (multiple strokes)  -- continue Midodrine 30 TID and taper as able     # Hx of HFpEF with likely ADHF with volume overload.   - ECHO (7/2023) with EF 59 with severe LVH and diastolic dysfunction   - STEPHANIE (8/18) with normal LVRVSF however noted with increased LA pressures and persistent LA septal bowing into RA.   - ECHO (8/11) with EF 60 with mild LVH, normal LVRVSF, and mild ddfxn.    HEENT   # Left ear OE   - ENT evaluation (9/7) with no mastoid tenderness, however found with positive swelling and excoriation to left auricle and tenderness to manipulation of auricle. Debrided crusting of auricle and EAC evaluated with edema and unable to visualize TM. EAC debrided and found with watery exudate.   - s/p CiproHC (9/5 - 9/16)   - Ana Cristina discontinued. Rash on torso concerning for possible drug rash  - ENT following and ear wick change QD, Wick was removed,  - ENT recommending CT IAC w/ IV con but family is refusing as concerned given CKD, kidneys wouldn't recover from IV contrast. Spoke with Nephrology, ENT, and patient's  at length. Planned for CT non con and per , decision will be made from there but for now doesn't want to risk further harming kidneys.  - CT IAC showing acute on chronic left-sided otitis externa and acute on chronic left-sided otomastoiditis. Superimposed left-sided tympanosclerosis. Superimposed cholesteatoma formation within the left tympanomastoid cavity cannot be excluded. No evidence of malignant otitis externa.  - ENT consulted (9/20) for white purulent discharge from left ear, recc: ENT: acetic acid drops BID to left ear. Mupirocin ointment to the left pinna BID, cover with xeroform after placing mupirocin ointment. Pressure offloading of the left ear. No plan for any surgical intervention from ENT at this time, ENT will sign off    # + L eye redness in setting of surrounding infectious process - Ophthalmology following, see in ID ,   -- L eye __ possible endophthalmitis vs HSV keratitis  -- Continue Ofloxacin 1 drop left eye 6 times a day (ordered by ophtho)  - Continue erythromycin ointment 4 times a day in the left eye (ordered by ophtho)  - PLEASE TAPE LEFT EYE SHUT AT ALL TIMES (can remove tape when administering eye drops)  - Continue preservative free artificial tears 4 times a day in the right eye (ordered by ophtho)  - Continue Lacrilube ointment twice a day in the right eye     # Oral Lesions with questionable Zoster vs Trauma?   - Patient with tongue pain and seen with anterior ulcerations consolidating and crusting and posterior ulceration.  - Case discussed with pathology resident and culture/ cytology sent.   - HSV negative and Path (9/6) with normal appearing epithelial cells singly and in clusters devoid of viral cytopathic effect.   - Continue on magic mouth wash for pain    RESPIRATORY  # Acute hypoxic/hypercapnic Respiratory failure likely iso volume overload   - Hx of CKD and HFpEF presented with SOB and hypercarbia second to volume overload requiring intubation and HD initiation   - Vent weaning attempted however limited requiring tracheostomy (9/1) with IP   - Course complicated by PIPs elevated (50s) and respiratory acidosis second to secretions vs volume ?    - Vent settings: 22/340/8/40%,   -- VBG 7.40/50. minimal secretions   -- pt has been failing PS trials, __ desynchronous, high peak pressures, tachypnia, low TV's   - POCUS (9/8) with BL pleural effusions (large on right and small on left)   - CT CHEST (9/8) with TBM, BL pleural effusions and continued consolidations   - (9/27) Over the past 24 hrs, pt with increasing O2 requirements and respiratory acidosis with poor TVs. Bedside bronchoscopy (+) severe dynamic airway collapse into distal end of tracheostomy extending to main ines and RM bronchus.  tracheostomy exchanged at bedside to size 6dXLT with repeat bronchoscopy showing some but not complete improvement in dynamic collapse.   - Continue volume removal with HD   - Continue on Duoneb for airway clearance        GI  # UGIB   - CGE x 1 episode on 8/24 and melena x 2 episodes on 8/31 likely residual blood.   - EGD (8/31) found with esophagitis and erosive gastropathy  - last PRBC transfusion on 9/19  - Continue on PPI BID through late October and then PPI QD   - No melena       # Dysphagia   - NGT-TF continued, changed to R nostril today given infections on L side  - Pending PEG however needs improvement in infectious status prior to placement.   -- pt is tolerating TF, + BM on 9/30 and 10/1  --cont BR     RENAL  # Progressive CKD, with renal failure   - Presented volume overload and progressive RUSS on CKD (baseline CRE in 2s and mode into the 3s on admission) likely obstruction vs low flow state with shock vs AIN from drug reaction issue?  - POCUS with no signs of hydronephrosis   - s/p Pressor support started with improvement in renal function  - Attempted steroid course in ICU without improvement in renal function, family deferred renal Bx   - HD inititated in ICU for volume overload, held 9/20 iso malfunctioning shiley and stable labs/vol status   - iso worsening hypoxia/respiratory acidosis, L IJ shiley placed 9/27.   -- HD on 9/27-- neg 1.3 L net and 9/28 with 1.8 L net neg, HD session on 9/30-- with 2 L net negative .  Minimal UOP, willard was removed on 9/26, bladder scan at 00:00 on 10/1 with 65 cc   -- UOP for LOS is + 22 L, and for 24 hrs is neg 630,   - renal following, Epogen twice a week, as per Dr Colon, no changes for now   - Monitor renal function and UOP, and electrolytes       Hyponatremia   - c/w Salt tabs - s/p Hypertonic 1.5%NS @ 50cc/hr x 5 hr;  --as per nephrology, Hyponatremia - low but acceptable, on NaCl tabs + intermittent HD    # Hyperphosphatemia (resolved)   - PTH normal and elevated phos likely from renal failure  - s/p Phos binder with Renvela - now d'beth as level low/ wnl      INFECTIOUS DISEASE  # ESBL ECOLI and MSSA VAP c/b MSSA bacteremia   - Course complicated by recurrent fevers, T max 100 today and 101.1 yesterday, last temp prior -- 100.5 on 9/20, WBC 8   - RVP/ COVID (8/11) negative   - SCx (8/11) with MSSA s/p cefepime (8/12-8/13) and then ancef (8/14) however noted with drug reaction and then changed to vanco and aztreonam (8/12-8/13) in ICU .   -- (s/p Vanc >> developed generalized rash and was switched to Daptomycin through 10/2 ( also developed possible cefepime vs. cefazolin drug-induced rash)   - SCx (9/1) with MSSA and ESBL ECOLI; (9/29) - Proteus and Pseudomonas  - BAL (9/1) with MSSA   - BCx (9/1) with MSSA and cleared on (9/4), 9/26-- NGTD, 9/29-- NGTD  -- 9/26-- UA with WBC 2489, large leuks, U/Cx-- Candida,   + possible L eye endophthalmitis vs HSV Keratitis, on Valtrex and eye gtts   + MSSA PCR s/p Mupirocin,   - ECHO (9/6) unable to rule out endocarditis, repeat TTE to evaluate to endocarditis    - LE DOPPLERS- Blood and sputum cx NTD; Acute left deep vein thrombosis of the left popliteal vein.  - s/p Ana Cristina (9/4 - 9/22 ) and Vanco by level (last dose 9/22 ) , pt noted w/ new erythematous patchy rash, did not improve after discontinuing the ana cristina, so vanco switched to changed to Daptomycin per ID recs (9/26-), monitor CK's  - s/p fluconazole 200 qd (9/219/28) Dc'd per ID recs given family states rash appeared post fluconazole initiation  - BCx 9/26- NTD, Urine Cx 9/26- NTD, MRSA pcr+ staph  -- recurrent F with 101.1 on 9/29, today afebrile __ repeat blood cxs sent- -on 9/29 -- NGTD sputum cx - Proteus and Pseudomonas  -Continue Daptomycin (plan for 4 weeks through 10/2)  -- as per discussion with ID, added meropenem empirically for F  (9/29 -  ) and started on Valtrex (9/29- ) as per ophthalmology recs (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 Debrided crusting of auricle and EAC evaluated with edema and unable to visualize TM. EAC debrided and found with watery exudate.   - s/p CiproHC (9/5 - 9/16)   -- ENT signed off     # MRSA PCRs   - MRSA PCR (8/11 and 8/14) negative. 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  -- Valtrex (9/29 - )    HEME  # Anemia second to GIB vs renal disease   - Hold chemical DVT PPX given bleeding/ waxing and waning HH   - s/p multiple units of PRBCs (8/15, 8/21, 8/28, 8/31, 9/8, 9/19)   - Anemia panel with AOCD but with low %sat and ferrous sulfate added to optimize   - Monitor HH, cont EPO twice as week as per renal    - GI cleared for to start Heparin gtt for + DVT       Vascular  # DVT  - Pt with + popliteal DVT on SCD Spoke with GI no absolute contraindication for starting Heparin - advise close monitoring for bleeding - Do stat CTA if bleeding occurs and call IR   - s/p Heparin gtt (9/21-9/30), - -9/30 -- As per discussion with Dr Katty Charles (neurology), Ok to transition off heparin gtt and start Eliquis (transitioned 9/30)    ONC   # Hx of Breast CA   - Patient dx in 2018 and s/p BL mastectomy (radical on right) and chemo and RT.   - Supportive care.     ENDOCRINE  # IDDM2   - Monitor FS and adjust as needed   - started stress dose steroids 9/27, tepering down to 25 mg TID (9/29), and 10/1 - daily x 3 d,  --  monitor FS --124>158  - -cont NPH to 11 U q 6 hrs, continue ISS     LINES/ TUBES  - R ARTHUR ODOM (8/19 - 9/21)   - L ARTHUR odom (9/27 - )  -- PIV's     SKIN  # Drug Eruption, resolved  - Attempted cefepime (8/12) vs Ancef (8/13-8/14) and then noted with drug rxn.   - Hold further cephalosporins at this time   - -also c/f rash as allergic reaction to Vancomycin -- discontinued   - s/p Steroids with prednisone 40 (8/18 - 9/2) then prednisone 30 (9/3-9/7), then prednisone 20 (9/8 - 9/10), then prednisone 10mg (9/11-9/13)      ETHICS/ GOC    - FULL CODE   -- pt's spouse and daughter are at the bedside and have been updated on pt's condition, all questions were answered     DISPO: transfer to RCU

## 2023-10-01 NOTE — PROGRESS NOTE ADULT - SUBJECTIVE AND OBJECTIVE BOX
CHIEF COMPLAINT: Patient is a 73y old Female who presents with a chief complaint of Respiratory distress.      Interval Events:      REVIEW OF SYSTEMS:  [ ] All other systems negative  [ ] Unable to assess ROS because ________      Mode: AC/ CMV (Assist Control/ Continuous Mandatory Ventilation), RR (machine): 22, TV (machine): 340, FiO2: 35, PEEP: 8, ITime: 0.81, MAP: 20, PIP: 55      OBJECTIVE:  ICU Vital Signs Last 24 Hrs  T(C): 37.1 (01 Oct 2023 08:00), Max: 37.5 (01 Oct 2023 04:00)  T(F): 98.8 (01 Oct 2023 08:00), Max: 99.5 (01 Oct 2023 04:00)  HR: 110 (01 Oct 2023 08:00) (84 - 118)  BP: 131/38 (01 Oct 2023 08:00) (108/34 - 131/38)  BP(mean): 51 (01 Oct 2023 08:00) (51 - 86)  RR: 20 (01 Oct 2023 08:00) (16 - 22)  SpO2: 97% (01 Oct 2023 08:00) (96% - 100%)    O2 Parameters below as of 01 Oct 2023 08:00  Patient On (Oxygen Delivery Method): ventilator    O2 Concentration (%): 30      Mode: AC/ CMV (Assist Control/ Continuous Mandatory Ventilation), RR (machine): 22, TV (machine): 340, FiO2: 35, PEEP: 8, ITime: 0.81, MAP: 20, PIP: 55    09-30 @ 07:01  -  10-01 @ 07:00  --------------------------------------------------------  IN: 1810 mL / OUT: 2400 mL / NET: -590 mL    10-01 @ 07:01  -  10-01 @ 10:10  --------------------------------------------------------  IN: 70 mL / OUT: 0 mL / NET: 70 mL      CAPILLARY BLOOD GLUCOSE      POCT Blood Glucose.: 158 mg/dL (01 Oct 2023 05:06)      HOSPITAL MEDICATIONS:  MEDICATIONS  (STANDING):  acetaminophen   IVPB .. 1000 milliGRAM(s) IV Intermittent once  acetic acid 0.25% Topical Irrigation 1 Application(s) Topical two times a day  albuterol/ipratropium for Nebulization 3 milliLiter(s) Nebulizer every 6 hours  apixaban 10 milliGRAM(s) Oral every 12 hours  artificial tears (preservative free) Ophthalmic Solution 1 Drop(s) Right EYE every 2 hours  aspirin  chewable 81 milliGRAM(s) Enteral Tube daily  atorvastatin 10 milliGRAM(s) Oral at bedtime  chlorhexidine 0.12% Liquid 15 milliLiter(s) Oral Mucosa every 12 hours  chlorhexidine 2% Cloths 1 Application(s) Topical daily  DAPTOmycin IVPB 500 milliGRAM(s) IV Intermittent every 48 hours  dextrose 5%. 1000 milliLiter(s) (50 mL/Hr) IV Continuous <Continuous>  dextrose 5%. 1000 milliLiter(s) (100 mL/Hr) IV Continuous <Continuous>  dextrose 50% Injectable 12.5 Gram(s) IV Push once  dextrose 50% Injectable 25 Gram(s) IV Push once  dextrose 50% Injectable 25 Gram(s) IV Push once  epoetin odalis (EPOGEN) Injectable 39202 Unit(s) SubCutaneous <User Schedule>  erythromycin   Ointment 1 Application(s) Left EYE four times a day  ferrous    sulfate Liquid 300 milliGRAM(s) Oral daily  glucagon  Injectable 1 milliGRAM(s) IntraMuscular once  hydrocortisone sodium succinate Injectable 25 milliGRAM(s) IV Push every 8 hours  insulin lispro (ADMELOG) corrective regimen sliding scale   SubCutaneous every 6 hours  insulin NPH human recombinant 11 Unit(s) SubCutaneous every 6 hours  meropenem  IVPB 500 milliGRAM(s) IV Intermittent every 24 hours  midodrine. 30 milliGRAM(s) Oral every 8 hours  ofloxacin 0.3% Solution 1 Drop(s) Left EYE every 2 hours  pantoprazole  Injectable 40 milliGRAM(s) IV Push two times a day  petrolatum Ophthalmic Ointment 1 Application(s) Right EYE two times a day  polyethylene glycol 3350 17 Gram(s) Oral two times a day  psyllium Powder 1 Packet(s) Oral daily  senna Syrup 10 milliLiter(s) Oral daily  sodium chloride 2 Gram(s) Oral two times a day  valACYclovir 500 milliGRAM(s) Oral every 24 hours    MEDICATIONS  (PRN):  acetaminophen   Oral Liquid .. 650 milliGRAM(s) Oral every 6 hours PRN Temp greater or equal to 38C (100.4F), Mild Pain (1 - 3)  artificial tears (preservative free) Ophthalmic Solution 1 Drop(s) Both EYES three times a day PRN Dry Eyes  calamine/zinc oxide Lotion 1 Application(s) Topical two times a day PRN Rash and/or Itching  dextrose Oral Gel 15 Gram(s) Oral once PRN Blood Glucose LESS THAN 70 milliGRAM(s)/deciliter  sodium chloride 0.9% lock flush 10 milliLiter(s) IV Push every 1 hour PRN Pre/post blood products, medications, blood draw, and to maintain line patency      LABS:                        7.1    8.76  )-----------( 348      ( 01 Oct 2023 00:35 )             22.0     10-01    136  |  97<L>  |  45<H>  ----------------------------<  126<H>  3.7   |  27  |  1.47<H>    Ca    8.6      01 Oct 2023 00:35  Phos  1.8     10-01  Mg     2.10     10-01    TPro  5.6<L>  /  Alb  2.3<L>  /  TBili  0.3  /  DBili  x   /  AST  53<H>  /  ALT  19  /  AlkPhos  307<H>  10-01    PTT - ( 01 Oct 2023 00:35 )  PTT:28.9 sec  Urinalysis Basic - ( 01 Oct 2023 00:35 )    Color: x / Appearance: x / SG: x / pH: x  Gluc: 126 mg/dL / Ketone: x  / Bili: x / Urobili: x   Blood: x / Protein: x / Nitrite: x   Leuk Esterase: x / RBC: x / WBC x   Sq Epi: x / Non Sq Epi: x / Bacteria: x        Venous Blood Gas:  10-01 @ 00:35  7.40/50/46/31/88.7  VBG Lactate: 0.9  Venous Blood Gas:  09-30 @ 00:40  7.35/49/51/27/87.3  VBG Lactate: 1.1      PAST MEDICAL & SURGICAL HISTORY:  Breast CA      Diabetes      Stroke      Cardiac arrest      HTN (hypertension)      H/O mastectomy, bilateral          FAMILY HISTORY:  No pertinent family history in first degree relatives        Social History:      RADIOLOGY:  [ ] Reviewed and interpreted by me    PULMONARY FUNCTION TESTS:    EKG: CHIEF COMPLAINT: Patient is a 73y old Female who presents with a chief complaint of Respiratory distress.      Interval Events: Transferred from MICU in AM.      REVIEW OF SYSTEMS:  [x] Unable to assess ROS because nonverbal and vented.      Mode: AC/ CMV (Assist Control/ Continuous Mandatory Ventilation), RR (machine): 22, TV (machine): 340, FiO2: 35, PEEP: 8, ITime: 0.81, MAP: 20, PIP: 55      OBJECTIVE:  ICU Vital Signs Last 24 Hrs  T(C): 37.1 (01 Oct 2023 08:00), Max: 37.5 (01 Oct 2023 04:00)  T(F): 98.8 (01 Oct 2023 08:00), Max: 99.5 (01 Oct 2023 04:00)  HR: 110 (01 Oct 2023 08:00) (84 - 118)  BP: 131/38 (01 Oct 2023 08:00) (108/34 - 131/38)  BP(mean): 51 (01 Oct 2023 08:00) (51 - 86)  RR: 20 (01 Oct 2023 08:00) (16 - 22)  SpO2: 97% (01 Oct 2023 08:00) (96% - 100%)    O2 Parameters below as of 01 Oct 2023 08:00  Patient On (Oxygen Delivery Method): ventilator    O2 Concentration (%): 30      Mode: AC/ CMV (Assist Control/ Continuous Mandatory Ventilation), RR (machine): 22, TV (machine): 340, FiO2: 35, PEEP: 8, ITime: 0.81, MAP: 20, PIP: 55    09-30 @ 07:01  -  10-01 @ 07:00  --------------------------------------------------------  IN: 1810 mL / OUT: 2400 mL / NET: -590 mL    10-01 @ 07:01  -  10-01 @ 10:10  --------------------------------------------------------  IN: 70 mL / OUT: 0 mL / NET: 70 mL      CAPILLARY BLOOD GLUCOSE      POCT Blood Glucose.: 158 mg/dL (01 Oct 2023 05:06)      HOSPITAL MEDICATIONS:  MEDICATIONS  (STANDING):  acetaminophen   IVPB .. 1000 milliGRAM(s) IV Intermittent once  acetic acid 0.25% Topical Irrigation 1 Application(s) Topical two times a day  albuterol/ipratropium for Nebulization 3 milliLiter(s) Nebulizer every 6 hours  apixaban 10 milliGRAM(s) Oral every 12 hours  artificial tears (preservative free) Ophthalmic Solution 1 Drop(s) Right EYE every 2 hours  aspirin  chewable 81 milliGRAM(s) Enteral Tube daily  atorvastatin 10 milliGRAM(s) Oral at bedtime  chlorhexidine 0.12% Liquid 15 milliLiter(s) Oral Mucosa every 12 hours  chlorhexidine 2% Cloths 1 Application(s) Topical daily  DAPTOmycin IVPB 500 milliGRAM(s) IV Intermittent every 48 hours  dextrose 5%. 1000 milliLiter(s) (50 mL/Hr) IV Continuous <Continuous>  dextrose 5%. 1000 milliLiter(s) (100 mL/Hr) IV Continuous <Continuous>  dextrose 50% Injectable 12.5 Gram(s) IV Push once  dextrose 50% Injectable 25 Gram(s) IV Push once  dextrose 50% Injectable 25 Gram(s) IV Push once  epoetin odalis (EPOGEN) Injectable 85553 Unit(s) SubCutaneous <User Schedule>  erythromycin   Ointment 1 Application(s) Left EYE four times a day  ferrous    sulfate Liquid 300 milliGRAM(s) Oral daily  glucagon  Injectable 1 milliGRAM(s) IntraMuscular once  hydrocortisone sodium succinate Injectable 25 milliGRAM(s) IV Push every 8 hours  insulin lispro (ADMELOG) corrective regimen sliding scale   SubCutaneous every 6 hours  insulin NPH human recombinant 11 Unit(s) SubCutaneous every 6 hours  meropenem  IVPB 500 milliGRAM(s) IV Intermittent every 24 hours  midodrine. 30 milliGRAM(s) Oral every 8 hours  ofloxacin 0.3% Solution 1 Drop(s) Left EYE every 2 hours  pantoprazole  Injectable 40 milliGRAM(s) IV Push two times a day  petrolatum Ophthalmic Ointment 1 Application(s) Right EYE two times a day  polyethylene glycol 3350 17 Gram(s) Oral two times a day  psyllium Powder 1 Packet(s) Oral daily  senna Syrup 10 milliLiter(s) Oral daily  sodium chloride 2 Gram(s) Oral two times a day  valACYclovir 500 milliGRAM(s) Oral every 24 hours    MEDICATIONS  (PRN):  acetaminophen   Oral Liquid .. 650 milliGRAM(s) Oral every 6 hours PRN Temp greater or equal to 38C (100.4F), Mild Pain (1 - 3)  artificial tears (preservative free) Ophthalmic Solution 1 Drop(s) Both EYES three times a day PRN Dry Eyes  calamine/zinc oxide Lotion 1 Application(s) Topical two times a day PRN Rash and/or Itching  dextrose Oral Gel 15 Gram(s) Oral once PRN Blood Glucose LESS THAN 70 milliGRAM(s)/deciliter  sodium chloride 0.9% lock flush 10 milliLiter(s) IV Push every 1 hour PRN Pre/post blood products, medications, blood draw, and to maintain line patency      LABS:                        7.1    8.76  )-----------( 348      ( 01 Oct 2023 00:35 )             22.0     10-01    136  |  97<L>  |  45<H>  ----------------------------<  126<H>  3.7   |  27  |  1.47<H>    Ca    8.6      01 Oct 2023 00:35  Phos  1.8     10-01  Mg     2.10     10-01    TPro  5.6<L>  /  Alb  2.3<L>  /  TBili  0.3  /  DBili  x   /  AST  53<H>  /  ALT  19  /  AlkPhos  307<H>  10-01    PTT - ( 01 Oct 2023 00:35 )  PTT:28.9 sec  Urinalysis Basic - ( 01 Oct 2023 00:35 )    Color: x / Appearance: x / SG: x / pH: x  Gluc: 126 mg/dL / Ketone: x  / Bili: x / Urobili: x   Blood: x / Protein: x / Nitrite: x   Leuk Esterase: x / RBC: x / WBC x   Sq Epi: x / Non Sq Epi: x / Bacteria: x      Venous Blood Gas:  10-01 @ 00:35  7.40/50/46/31/88.7  VBG Lactate: 0.9  Venous Blood Gas:  09-30 @ 00:40  7.35/49/51/27/87.3  VBG Lactate: 1.1      PAST MEDICAL & SURGICAL HISTORY:  Breast CA      Diabetes      Stroke      Cardiac arrest      HTN (hypertension)      H/O mastectomy, bilateral      FAMILY HISTORY:  No pertinent family history in first degree relatives      Social History:      RADIOLOGY:  [ ] Reviewed and interpreted by me    PULMONARY FUNCTION TESTS:    EKG:

## 2023-10-01 NOTE — PROGRESS NOTE ADULT - SUBJECTIVE AND OBJECTIVE BOX
Significant recent/past 24 hr events:    Subjective:    Review of Systems         [ ] A ten-point review of systems was otherwise negative except as noted.  [ ] Due to altered mental status/intubation, subjective information were not able to be obtained from the patient. History was obtained, to the extent possible, from review of the chart and collateral sources of information.      Patient is a 73y old  Female who presents with a chief complaint of Respiratory distress (30 Sep 2023 22:07)    HPI:  72 y/o F DM on insulin, HTN, CKD,breast CA, respiratory arrest and cardiac arrest (2018), CVA with residual weakness, aspiration PNA h/o trache, PEG, both removed presents with respiratory distress requiring intubation. Had recent admission d/c 7/22/23 for cough and respiratory distress (no intubation) thought to be i/s/o aspiration PNA s/p cefepime course; developed rash in response. Infectious w/u was negative at this time. Was discharged home, daughter and  at bedside providing history.     Reports that she was initially improving with O2 sats in the high 90s on room air, however, then became more lethargic and not herself (baseline mental status AOx3). Also had worsening shortness of breath while laying down and leg swelling. Contacted cardiologist who started her on bumex 1mg daily. Developed low sugars overnight before admission and was given juice with some food, daughter reports no coughing during episode. At around 4-5 AM developed vomiting and coughing, O2 sats dropped to 80s and EMS was called. Denies fevers/chills, dysuria or urinary frequency, chest pain, palpitations. Uses wheelchair at home however family moves her around frequently.     In ED, was on BiPAP with increased WOB and was intubated. Found to have elevated pro-BNP and CO2 of 111 on VBG. CXR with small right pleural effusion, started on vanc in the ED.  (11 Aug 2023 09:08)    PAST MEDICAL & SURGICAL HISTORY:  Breast CA      Diabetes      Stroke      Cardiac arrest      HTN (hypertension)      H/O mastectomy, bilateral        FAMILY HISTORY:  No pertinent family history in first degree relatives        Vitals   ICU Vital Signs Last 24 Hrs  T(C): 37.5 (01 Oct 2023 04:00), Max: 37.5 (01 Oct 2023 04:00)  T(F): 99.5 (01 Oct 2023 04:00), Max: 99.5 (01 Oct 2023 04:00)  HR: 110 (01 Oct 2023 04:00) (105 - 118)  BP: 120/38 (01 Oct 2023 04:00) (107/37 - 129/45)  BP(mean): 54 (01 Oct 2023 04:00) (50 - 86)  ABP: --  ABP(mean): --  RR: 22 (01 Oct 2023 04:00) (16 - 22)  SpO2: 98% (01 Oct 2023 04:00) (97% - 100%)    O2 Parameters below as of 01 Oct 2023 04:00  Patient On (Oxygen Delivery Method): ventilator    O2 Concentration (%): 40        Physical Exam:   Constitutional: NAD, well-groomed, well-developed  HEENT: PERRLA, EOMI, no drainage or redness  Neck: supple,  No JVD, Trachea midline  Back: Normal spine flexure, No CVA tenderness, No deformity or limitation of movement  Respiratory: Breath Sounds equal & clear bilaterally to auscultation, no accessory muscle use noted  Cardiovascular: Regular rate, regular rhythm, normal S1, S2; no murmurs or rub  Gastrointestinal: Soft, non-tender, non distended, no hepatosplenomegaly, normal bowel sounds  Extremities: CENTENO x 4, no peripheral edema, no cyanosis, no clubbing   Vascular: Equal and normal pulses: 2+ peripheral pulses throughout  Neurological: A+O x 3; speech clear and intact; no sensory, motor  deficits, normal reflexes  Psychiatric: calm, normal mood, normal affect  Musculoskeletal: No joint swelling or deformity; no limitation of movement  Skin: warm, dry, well perfused, no rashes    VENT SETTINGS   Mode: AC/ CMV (Assist Control/ Continuous Mandatory Ventilation)  RR (machine): 22  TV (machine): 340  FiO2: 35  PEEP: 5  ITime: 0.7  MAP: 19  PIP: 50        I&O's Detail    29 Sep 2023 07:01  -  30 Sep 2023 07:00  --------------------------------------------------------  IN:    Enteral Tube Flush: 100 mL    Heparin: 210 mL    IV PiggyBack: 150 mL    Nepro with Carb Steady: 840 mL  Total IN: 1300 mL    OUT:    Indwelling Catheter - Urethral (mL): 30 mL  Total OUT: 30 mL    Total NET: 1270 mL      30 Sep 2023 07:01  -  01 Oct 2023 06:31  --------------------------------------------------------  IN:    Enteral Tube Flush: 200 mL    Heparin: 100 mL    IV PiggyBack: 150 mL    Nepro with Carb Steady: 920 mL    Other (mL): 400 mL  Total IN: 1770 mL    OUT:    Other (mL): 2400 mL    Voided (mL): 0 mL  Total OUT: 2400 mL    Total NET: -630 mL          LABS                        7.1    8.76  )-----------( 348      ( 01 Oct 2023 00:35 )             22.0     10-01    136  |  97<L>  |  45<H>  ----------------------------<  126<H>  3.7   |  27  |  1.47<H>    Ca    8.6      01 Oct 2023 00:35  Phos  1.8     10-01  Mg     2.10     10-01    TPro  5.6<L>  /  Alb  2.3<L>  /  TBili  0.3  /  DBili  x   /  AST  53<H>  /  ALT  19  /  AlkPhos  307<H>  10-01    LIVER FUNCTIONS - ( 01 Oct 2023 00:35 )  Alb: 2.3 g/dL / Pro: 5.6 g/dL / ALK PHOS: 307 U/L / ALT: 19 U/L / AST: 53 U/L / GGT: x           PTT - ( 01 Oct 2023 00:35 )  PTT:28.9 sec        Urinalysis Basic - ( 01 Oct 2023 00:35 )    Color: x / Appearance: x / SG: x / pH: x  Gluc: 126 mg/dL / Ketone: x  / Bili: x / Urobili: x   Blood: x / Protein: x / Nitrite: x   Leuk Esterase: x / RBC: x / WBC x   Sq Epi: x / Non Sq Epi: x / Bacteria: x      POCT Blood Glucose.: 158 mg/dL *H* (10-01-23 @ 05:06)  POCT Blood Glucose.: 124 mg/dL *H* (09-30-23 @ 23:46)  POCT Blood Glucose.: 131 mg/dL *H* (09-30-23 @ 17:09)  POCT Blood Glucose.: 206 mg/dL *H* (09-30-23 @ 11:51)        MEDICATIONS  (STANDING):  acetaminophen   IVPB .. 1000 milliGRAM(s) IV Intermittent once  acetic acid 0.25% Topical Irrigation 1 Application(s) Topical two times a day  albuterol/ipratropium for Nebulization 3 milliLiter(s) Nebulizer every 6 hours  apixaban 10 milliGRAM(s) Oral every 12 hours  artificial tears (preservative free) Ophthalmic Solution 1 Drop(s) Right EYE every 2 hours  aspirin  chewable 81 milliGRAM(s) Enteral Tube daily  atorvastatin 10 milliGRAM(s) Oral at bedtime  chlorhexidine 0.12% Liquid 15 milliLiter(s) Oral Mucosa every 12 hours  chlorhexidine 2% Cloths 1 Application(s) Topical daily  DAPTOmycin IVPB 500 milliGRAM(s) IV Intermittent every 48 hours  dextrose 5%. 1000 milliLiter(s) (50 mL/Hr) IV Continuous <Continuous>  dextrose 5%. 1000 milliLiter(s) (100 mL/Hr) IV Continuous <Continuous>  dextrose 50% Injectable 12.5 Gram(s) IV Push once  dextrose 50% Injectable 25 Gram(s) IV Push once  dextrose 50% Injectable 25 Gram(s) IV Push once  epoetin odalis (EPOGEN) Injectable 87600 Unit(s) SubCutaneous <User Schedule>  erythromycin   Ointment 1 Application(s) Left EYE four times a day  ferrous    sulfate Liquid 300 milliGRAM(s) Oral daily  glucagon  Injectable 1 milliGRAM(s) IntraMuscular once  hydrocortisone sodium succinate Injectable 25 milliGRAM(s) IV Push every 8 hours  insulin lispro (ADMELOG) corrective regimen sliding scale   SubCutaneous every 6 hours  insulin NPH human recombinant 11 Unit(s) SubCutaneous every 6 hours  meropenem  IVPB 500 milliGRAM(s) IV Intermittent every 24 hours  midodrine. 30 milliGRAM(s) Oral every 8 hours  mupirocin 2% Ointment 1 Application(s) Topical two times a day  ofloxacin 0.3% Solution 1 Drop(s) Left EYE every 2 hours  pantoprazole  Injectable 40 milliGRAM(s) IV Push two times a day  petrolatum Ophthalmic Ointment 1 Application(s) Right EYE two times a day  polyethylene glycol 3350 17 Gram(s) Oral two times a day  psyllium Powder 1 Packet(s) Oral daily  senna Syrup 10 milliLiter(s) Oral daily  sodium chloride 2 Gram(s) Oral two times a day  valACYclovir 500 milliGRAM(s) Oral every 24 hours    MEDICATIONS  (PRN):  acetaminophen   Oral Liquid .. 650 milliGRAM(s) Oral every 6 hours PRN Temp greater or equal to 38C (100.4F), Mild Pain (1 - 3)  artificial tears (preservative free) Ophthalmic Solution 1 Drop(s) Both EYES three times a day PRN Dry Eyes  calamine/zinc oxide Lotion 1 Application(s) Topical two times a day PRN Rash and/or Itching  dextrose Oral Gel 15 Gram(s) Oral once PRN Blood Glucose LESS THAN 70 milliGRAM(s)/deciliter  sodium chloride 0.9% lock flush 10 milliLiter(s) IV Push every 1 hour PRN Pre/post blood products, medications, blood draw, and to maintain line patency      Allergies:  isoniazid (Rash)  nafcillin (Unknown)  hydrALAZINE (Rash)  vitamin E (Short breath; Urticaria; Hives)  doxycycline (Rash)  cefepime (Rash)  NIFEdipine (Urticaria; Hives)        CRITICAL CARE TIME SPENT:  minutes of critical care time spent providing medical care for patient's acute illness/conditions that impairs at least one vital organ system and/or poses a high risk of imminent or life threatening deterioration in the patient's condition. It includes time spent evaluating and treating the patient's acute illness as well as time spent reviewing labs, radiology, discussing goals of care with patient and/or patient's family, and discussing the case with a multidisciplinary team, in an effort to prevent further life threatening deterioration or end organ damage. This time is independent of any procedures performed.         Significant recent/past 24 hr events: T max 99.5, no significant events     Subjective: pt cannot provide any history in s/o multiple strokes/ metabolic encephalopathy     Review of Systems         [ ] A ten-point review of systems was otherwise negative except as noted.  [ ] Due to altered mental status/intubation, subjective information were not able to be obtained from the patient. History was obtained, to the extent possible, from review of the chart and collateral sources of information.      Patient is a 73y old  Female who presents with a chief complaint of Respiratory distress (30 Sep 2023 22:07)    HPI:  74 y/o F DM on insulin, HTN, CKD,breast CA, respiratory arrest and cardiac arrest (2018), CVA with residual weakness, aspiration PNA h/o trache, PEG, both removed presents with respiratory distress requiring intubation. Had recent admission d/c 7/22/23 for cough and respiratory distress (no intubation) thought to be i/s/o aspiration PNA s/p cefepime course; developed rash in response. Infectious w/u was negative at this time. Was discharged home, daughter and  at bedside providing history.     Reports that she was initially improving with O2 sats in the high 90s on room air, however, then became more lethargic and not herself (baseline mental status AOx3). Also had worsening shortness of breath while laying down and leg swelling. Contacted cardiologist who started her on bumex 1mg daily. Developed low sugars overnight before admission and was given juice with some food, daughter reports no coughing during episode. At around 4-5 AM developed vomiting and coughing, O2 sats dropped to 80s and EMS was called. Denies fevers/chills, dysuria or urinary frequency, chest pain, palpitations. Uses wheelchair at home however family moves her around frequently.     In ED, was on BiPAP with increased WOB and was intubated. Found to have elevated pro-BNP and CO2 of 111 on VBG. CXR with small right pleural effusion, started on vanc in the ED.  (11 Aug 2023 09:08)    PAST MEDICAL & SURGICAL HISTORY:  Breast CA      Diabetes      Stroke      Cardiac arrest      HTN (hypertension)      H/O mastectomy, bilateral        FAMILY HISTORY:  No pertinent family history in first degree relatives        Vitals   ICU Vital Signs Last 24 Hrs  T(C): 37.5 (01 Oct 2023 04:00), Max: 37.5 (01 Oct 2023 04:00)  T(F): 99.5 (01 Oct 2023 04:00), Max: 99.5 (01 Oct 2023 04:00)  HR: 110 (01 Oct 2023 04:00) (105 - 118)  BP: 120/38 (01 Oct 2023 04:00) (107/37 - 129/45)  BP(mean): 54 (01 Oct 2023 04:00) (50 - 86)  ABP: --  ABP(mean): --  RR: 22 (01 Oct 2023 04:00) (16 - 22)  SpO2: 98% (01 Oct 2023 04:00) (97% - 100%)    O2 Parameters below as of 01 Oct 2023 04:00  Patient On (Oxygen Delivery Method): ventilator    O2 Concentration (%): 40        Physical Exam:   Constitutional: NAD, well-groomed, well-developed  HEENT: PERRLA, EOMI, no drainage or redness  Neck: supple,  No JVD, Trachea midline  Back: Normal spine flexure, No CVA tenderness, No deformity or limitation of movement  Respiratory: Breath Sounds equal & clear bilaterally to auscultation, no accessory muscle use noted  Cardiovascular: Regular rate, regular rhythm, normal S1, S2; no murmurs or rub  Gastrointestinal: Soft, non-tender, non distended, no hepatosplenomegaly, normal bowel sounds  Extremities: CENTENO x 4, 2 + peripheral edema, no cyanosis, no clubbing   Vascular: Equal and normal pulses: 2+ peripheral pulses throughout  Neurological: opens eyes spontaneously, intermittently tracking, not following commands, no sensory, motor  deficits, normal reflexes  Psychiatric: calm, normal mood, normal affect  Musculoskeletal: No joint swelling or deformity; no limitation of movement  Skin: warm, dry, well perfused, no rashes    VENT SETTINGS   Mode: AC/ CMV (Assist Control/ Continuous Mandatory Ventilation)  RR (machine): 22  TV (machine): 340  FiO2: 35  PEEP: 5  ITime: 0.7  MAP: 19  PIP: 50        I&O's Detail    29 Sep 2023 07:01  -  30 Sep 2023 07:00  --------------------------------------------------------  IN:    Enteral Tube Flush: 100 mL    Heparin: 210 mL    IV PiggyBack: 150 mL    Nepro with Carb Steady: 840 mL  Total IN: 1300 mL    OUT:    Indwelling Catheter - Urethral (mL): 30 mL  Total OUT: 30 mL    Total NET: 1270 mL      30 Sep 2023 07:01  -  01 Oct 2023 06:31  --------------------------------------------------------  IN:    Enteral Tube Flush: 200 mL    Heparin: 100 mL    IV PiggyBack: 150 mL    Nepro with Carb Steady: 920 mL    Other (mL): 400 mL  Total IN: 1770 mL    OUT:    Other (mL): 2400 mL    Voided (mL): 0 mL  Total OUT: 2400 mL    Total NET: -630 mL          LABS                        7.1    8.76  )-----------( 348      ( 01 Oct 2023 00:35 )             22.0     10-01    136  |  97<L>  |  45<H>  ----------------------------<  126<H>  3.7   |  27  |  1.47<H>    Ca    8.6      01 Oct 2023 00:35  Phos  1.8     10-01  Mg     2.10     10-01    TPro  5.6<L>  /  Alb  2.3<L>  /  TBili  0.3  /  DBili  x   /  AST  53<H>  /  ALT  19  /  AlkPhos  307<H>  10-01    LIVER FUNCTIONS - ( 01 Oct 2023 00:35 )  Alb: 2.3 g/dL / Pro: 5.6 g/dL / ALK PHOS: 307 U/L / ALT: 19 U/L / AST: 53 U/L / GGT: x           PTT - ( 01 Oct 2023 00:35 )  PTT:28.9 sec        Urinalysis Basic - ( 01 Oct 2023 00:35 )    Color: x / Appearance: x / SG: x / pH: x  Gluc: 126 mg/dL / Ketone: x  / Bili: x / Urobili: x   Blood: x / Protein: x / Nitrite: x   Leuk Esterase: x / RBC: x / WBC x   Sq Epi: x / Non Sq Epi: x / Bacteria: x      POCT Blood Glucose.: 158 mg/dL *H* (10-01-23 @ 05:06)  POCT Blood Glucose.: 124 mg/dL *H* (09-30-23 @ 23:46)  POCT Blood Glucose.: 131 mg/dL *H* (09-30-23 @ 17:09)  POCT Blood Glucose.: 206 mg/dL *H* (09-30-23 @ 11:51)        MEDICATIONS  (STANDING):  acetaminophen   IVPB .. 1000 milliGRAM(s) IV Intermittent once  acetic acid 0.25% Topical Irrigation 1 Application(s) Topical two times a day  albuterol/ipratropium for Nebulization 3 milliLiter(s) Nebulizer every 6 hours  apixaban 10 milliGRAM(s) Oral every 12 hours  artificial tears (preservative free) Ophthalmic Solution 1 Drop(s) Right EYE every 2 hours  aspirin  chewable 81 milliGRAM(s) Enteral Tube daily  atorvastatin 10 milliGRAM(s) Oral at bedtime  chlorhexidine 0.12% Liquid 15 milliLiter(s) Oral Mucosa every 12 hours  chlorhexidine 2% Cloths 1 Application(s) Topical daily  DAPTOmycin IVPB 500 milliGRAM(s) IV Intermittent every 48 hours  dextrose 5%. 1000 milliLiter(s) (50 mL/Hr) IV Continuous <Continuous>  dextrose 5%. 1000 milliLiter(s) (100 mL/Hr) IV Continuous <Continuous>  dextrose 50% Injectable 12.5 Gram(s) IV Push once  dextrose 50% Injectable 25 Gram(s) IV Push once  dextrose 50% Injectable 25 Gram(s) IV Push once  epoetin odalis (EPOGEN) Injectable 19355 Unit(s) SubCutaneous <User Schedule>  erythromycin   Ointment 1 Application(s) Left EYE four times a day  ferrous    sulfate Liquid 300 milliGRAM(s) Oral daily  glucagon  Injectable 1 milliGRAM(s) IntraMuscular once  hydrocortisone sodium succinate Injectable 25 milliGRAM(s) IV Push every 8 hours  insulin lispro (ADMELOG) corrective regimen sliding scale   SubCutaneous every 6 hours  insulin NPH human recombinant 11 Unit(s) SubCutaneous every 6 hours  meropenem  IVPB 500 milliGRAM(s) IV Intermittent every 24 hours  midodrine. 30 milliGRAM(s) Oral every 8 hours  mupirocin 2% Ointment 1 Application(s) Topical two times a day  ofloxacin 0.3% Solution 1 Drop(s) Left EYE every 2 hours  pantoprazole  Injectable 40 milliGRAM(s) IV Push two times a day  petrolatum Ophthalmic Ointment 1 Application(s) Right EYE two times a day  polyethylene glycol 3350 17 Gram(s) Oral two times a day  psyllium Powder 1 Packet(s) Oral daily  senna Syrup 10 milliLiter(s) Oral daily  sodium chloride 2 Gram(s) Oral two times a day  valACYclovir 500 milliGRAM(s) Oral every 24 hours    MEDICATIONS  (PRN):  acetaminophen   Oral Liquid .. 650 milliGRAM(s) Oral every 6 hours PRN Temp greater or equal to 38C (100.4F), Mild Pain (1 - 3)  artificial tears (preservative free) Ophthalmic Solution 1 Drop(s) Both EYES three times a day PRN Dry Eyes  calamine/zinc oxide Lotion 1 Application(s) Topical two times a day PRN Rash and/or Itching  dextrose Oral Gel 15 Gram(s) Oral once PRN Blood Glucose LESS THAN 70 milliGRAM(s)/deciliter  sodium chloride 0.9% lock flush 10 milliLiter(s) IV Push every 1 hour PRN Pre/post blood products, medications, blood draw, and to maintain line patency      Allergies:  isoniazid (Rash)  nafcillin (Unknown)  hydrALAZINE (Rash)  vitamin E (Short breath; Urticaria; Hives)  doxycycline (Rash)  cefepime (Rash)  NIFEdipine (Urticaria; Hives)

## 2023-10-01 NOTE — PROGRESS NOTE ADULT - ASSESSMENT
74 y/o F well known to me from my \[facility] [place] [facility]\"" outpt practice. 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   In  am of 8/11/23  ptn presented with respiratory distress w hypoxia and hypercarbia requiring intubation 2/2 volume overload +/- Asp PNA      Neuro   responds appropriately   Baseline MS AOx3, aphasic   - h/o CVA , on aspirin and statin . resumed w feeding tube, ASA resumed 8/26  - eeg  2/2 tremors, no sz focus  - less responsive in the past few days  - MRI 8/17:  new R cerebellar infarct, old left PCA/Occipital infarct. 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 Mcnulty, now in MICU  s/p septic shock overnight 9/1, now on meropenem, HDS  s/p trache, on vent support, copious secretions      Renal     Ptn well know to Dr. Colon,following   HD 8/19,  8/21, 8/26 and 8/28.  s/p PUF 8/29 2.5 Liters, HD 8/30,  9/1, 9/4  L IJ HD placed  uremic  hyponatremic  HD as per renal        Hypotension  - Levo drip  prognosis is poor  consider palliative care consult    GI  NPO  on tube feeds  coffee ground emesis x 1 8/24, melena overnight 8/31, s/p 1UPRBC, EGD/Colonoscopy: erosive gastropathy, esophagisits, on colonoscopy old blood seen no active bleeding, poor prep  family to decide re PEG placement    Endocrine  T2DM   A1C 7.1 in 7/2023   - BG goal 140-200     ID   No fever/leukocytosis, recheck temp   Hx latent TB which was treated, no concern for TB   - grew MAC on AFB culture from most recent admission. at [facility]  -MSSA Bacteremia 9/1, allergic to cephalosprins and PCN, on Vanco as per ID, renal dosing,   - sputum also grew E.Coli-ESBL, as per ID recs for  Bradford through 9/7( 1 week course as per ID) , afebrile.  - 9/8:  spike  temp 38.1C, has O. externa, has a wick, recs are to get a CT to R/O an abscess/bony involvement. cont Meropenem  9/9: ptn w fever overnight to 100.8,  may need shiley pulled if bacteremic, needs NECK CT as per ENT to R/O Mastoid bone involvement while having ongoing purulent DC from L ear 2/2 O. externa, getting daily wick changes  9/10:  spiked 102 overnight through Bradford and Vanco, purulent DC from O. externa, ENT recommend Ct of mastoid. ptn in HD, has Shiley in place, consider pulling shiley and send for Cx. would d/w Renal, and consider contacting  ID, last seen by Dr. Conner 9/6 9/11: remains febrile, Dr. Conner reconsulted. cont Bradford, Vanco  9/12: tmax 100.5, fever curve is improving, awaiting Left ear CT, on Bradford since 9/1. on  vanco for MSSA Bacteremia, does not tolerate cephalosporins. through 10/2  9/13: on full vent support via trache, appears uncomfortable and onur 2/2 Left O. externa. has an incidental left middle ear tumor, no acute middle ear interventions recommended, left ear wick replaced. on Meropenem, cx from Ear DC is neg. On Vanco for MSSA bacteremia through 10/2, course of Meropenem as per ID, afebrile in the past 24 hrs . Cardura for HTN resumed, HGB dropped to 6.4, got a dose of EPO and 1UPRBC, rpt 7.8, remains on HEPARIN drip for LEFT popliteal DVT  9/14: cont on Mupirocin locally to Left ear, cont IV Bradford, ENT signed off, afebrile x 48 hrs, Mro course to be determined by ID, on Vanco for MSSA bacteremia through 10/2. ongoing worsening hyponatremia, Hypertonic saline as per renal, no HD today, s/p 1UPRBC 9/13  9/15: spiking temps again, if cont to spike as per ID re PAN scan. cont Vanco for MSSA Bacteremia  9/16-18: no temp overnight  afebrile, cont to have Left ear DC,   on Vanco and ,bradford through 10/2  On IV Fluconazole for fungal cx+ from O. externa Discharge.   fluconazole started 9/21, ptn developed an allergic rash on 9/22. doubt its 2/2 to MEROPENEM or Vanco.  MEropenem was DCed 2/2 causing possible drug rash.  on VANCO based on levels for MSSA bacteremia but DCed 2/2 poss drug rash, now on Daptomycin  9/29: ptn is septic, meropenem resumed, s/p HD 9/28, next HD tomorrow    Heme/Onc  ppx: place on SCD  Transfuse for hgb 6.4, no obv bleed. HDS  LEFT POPLITEAL VEIN ACUTE DVT on 9/10, on Heparin drip    Ethics  GOC - Discussed GOC with daughter and , they have opted in the past for full code. and she remains full code at present      9/27:  In Micu, R IJ HD catheter placed, NGT in place, acidotic on VBG, trache to vent, anasarca, on IV BUMEX, on Levo, on Heparin drip, on stress dose HYdrocortisone, FS in range, uremic, awaiting HD, CT C/A/P w no acute findings. VANCO Dced , now on Dapto, for MSSA baceteremia through 10/2    9/28: HD started 9/27, still encephalopathic, fluconazole DCed as it could be the cause of the rash. on Dapto to complete a course of Abx for MSSA bacteremia through 10/2, VANCO DCed 2/2 possible rash. off pressors    9/30: sepsis resolved, off fluconazole, would add FLUCONAZOLE to the allergy list. on Meropenem, on Dapto through 10/2 instead of Vanco.Vanco was DCed as preseumed ptn had an allergic rash to Vanco. she is not allergic to Vanco. vent dependent. tolerating HD. s/p HD today, next on 10/3    10/1: back in RCU, trach to vent,  tolerating HD well, on Bradford, Dapto, off fluconazole 2/2 allergic rxn

## 2023-10-01 NOTE — PROGRESS NOTE ADULT - ASSESSMENT
seen and examined earlier in AM   trached to vent  NAD  s/p HD yesterday with 2 liters net fluid loss  afebrile  Next HD Tuesday  Transferred from MICU to RCU earlier this AM  updated   at bedside    acetaminophen   IVPB .. 1000 milliGRAM(s) IV Intermittent once  acetaminophen   Oral Liquid .. 650 milliGRAM(s) Oral every 6 hours PRN  acetic acid 0.25% Topical Irrigation 1 Application(s) Topical two times a day  albuterol/ipratropium for Nebulization 3 milliLiter(s) Nebulizer every 6 hours  apixaban 10 milliGRAM(s) Oral every 12 hours  artificial tears (preservative free) Ophthalmic Solution 1 Drop(s) Right EYE every 4 hours  artificial tears (preservative free) Ophthalmic Solution 1 Drop(s) Both EYES three times a day PRN  aspirin  chewable 81 milliGRAM(s) Enteral Tube daily  atorvastatin 10 milliGRAM(s) Oral at bedtime  calamine/zinc oxide Lotion 1 Application(s) Topical two times a day PRN  chlorhexidine 0.12% Liquid 15 milliLiter(s) Oral Mucosa every 12 hours  chlorhexidine 2% Cloths 1 Application(s) Topical daily  DAPTOmycin IVPB 500 milliGRAM(s) IV Intermittent every 48 hours  dextrose 5%. 1000 milliLiter(s) IV Continuous <Continuous>  dextrose 5%. 1000 milliLiter(s) IV Continuous <Continuous>  dextrose 50% Injectable 12.5 Gram(s) IV Push once  dextrose 50% Injectable 25 Gram(s) IV Push once  dextrose 50% Injectable 25 Gram(s) IV Push once  dextrose Oral Gel 15 Gram(s) Oral once PRN  epoetin odalis (EPOGEN) Injectable 47451 Unit(s) SubCutaneous <User Schedule>  erythromycin   Ointment 1 Application(s) Left EYE four times a day  ferrous    sulfate Liquid 300 milliGRAM(s) Oral daily  glucagon  Injectable 1 milliGRAM(s) IntraMuscular once  hydrocortisone sodium succinate Injectable 25 milliGRAM(s) IV Push every 8 hours  insulin lispro (ADMELOG) corrective regimen sliding scale   SubCutaneous every 6 hours  insulin NPH human recombinant 11 Unit(s) SubCutaneous every 6 hours  meropenem  IVPB 500 milliGRAM(s) IV Intermittent every 24 hours  midodrine. 30 milliGRAM(s) Oral every 8 hours  ofloxacin 0.3% Solution 1 Drop(s) Left EYE every 4 hours  pantoprazole  Injectable 40 milliGRAM(s) IV Push two times a day  petrolatum Ophthalmic Ointment 1 Application(s) Right EYE two times a day  polyethylene glycol 3350 17 Gram(s) Oral two times a day  psyllium Powder 1 Packet(s) Oral daily  senna Syrup 10 milliLiter(s) Oral daily  sodium chloride 2 Gram(s) Oral two times a day  sodium chloride 0.9% lock flush 10 milliLiter(s) IV Push every 1 hour PRN  valACYclovir 500 milliGRAM(s) Oral every 24 hours      VITAL:  T(C): , Max: 37.5 (10-01-23 @ 04:00)  T(F): , Max: 99.5 (10-01-23 @ 04:00)  HR: 112 (10-01-23 @ 11:37)  BP: 131/38 (10-01-23 @ 08:00)  BP(mean): 51 (10-01-23 @ 08:00)  RR: 20 (10-01-23 @ 08:00)  SpO2: 94% (10-01-23 @ 11:37)  Wt(kg): --    09-30-23 @ 07:01  -  10-01-23 @ 07:00  --------------------------------------------------------  IN: 1810 mL / OUT: 2400 mL / NET: -590 mL    10-01-23 @ 07:01  -  10-01-23 @ 12:14  --------------------------------------------------------  IN: 70 mL / OUT: 0 mL / NET: 70 mL        PHYSICAL EXAM:  Constitutional: sedated  HEENT: trach-vent, (+)NGT  Respiratory: coarse BS b/l  Cardiovascular: tachy ,reg s1s2  Gastrointestinal: BS+, soft, NT/ND  Extremities: + peripheral edema  Neurological: reduced generalized strength   : (+) Wheeler  Skin: No rashes  Access: (+)TRISHA odom    LABS:                          7.1    8.76  )-----------( 348      ( 01 Oct 2023 00:35 )             22.0     Na(136)/K(3.7)/Cl(97)/HCO3(27)/BUN(45)/Cr(1.47)Glu(126)/Ca(8.6)/Mg(2.10)/PO4(1.8)    10-01 @ 00:35  Na(130)/K(3.6)/Cl(95)/HCO3(26)/BUN(64)/Cr(1.99)Glu(256)/Ca(9.1)/Mg(2.20)/PO4(2.6)    09-30 @ 00:40  Na(129)/K(3.6)/Cl(91)/HCO3(25)/BUN(55)/Cr(1.78)Glu(263)/Ca(9.1)/Mg(2.20)/PO4(2.8)    09-29 @ 03:21    Urinalysis Basic - ( 01 Oct 2023 00:35 )    Color: x / Appearance: x / SG: x / pH: x  Gluc: 126 mg/dL / Ketone: x  / Bili: x / Urobili: x   Blood: x / Protein: x / Nitrite: x   Leuk Esterase: x / RBC: x / WBC x   Sq Epi: x / Non Sq Epi: x / Bacteria: x            ASSESSMENT/PLAN  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

## 2023-10-01 NOTE — PROGRESS NOTE ADULT - SUBJECTIVE AND OBJECTIVE BOX
Subjective: Patient seen and examined. No new events except as noted.   moved out of ICU     REVIEW OF SYSTEMS:  Unable to obtain       MEDICATIONS:  MEDICATIONS  (STANDING):  acetaminophen   IVPB .. 1000 milliGRAM(s) IV Intermittent once  acetic acid 0.25% Topical Irrigation 1 Application(s) Topical two times a day  albuterol/ipratropium for Nebulization 3 milliLiter(s) Nebulizer every 6 hours  apixaban 10 milliGRAM(s) Oral every 12 hours  artificial tears (preservative free) Ophthalmic Solution 1 Drop(s) Right EYE every 4 hours  aspirin  chewable 81 milliGRAM(s) Enteral Tube daily  atorvastatin 10 milliGRAM(s) Oral at bedtime  chlorhexidine 0.12% Liquid 15 milliLiter(s) Oral Mucosa every 12 hours  chlorhexidine 2% Cloths 1 Application(s) Topical daily  DAPTOmycin IVPB 500 milliGRAM(s) IV Intermittent every 48 hours  dextrose 5%. 1000 milliLiter(s) (100 mL/Hr) IV Continuous <Continuous>  dextrose 5%. 1000 milliLiter(s) (50 mL/Hr) IV Continuous <Continuous>  dextrose 50% Injectable 12.5 Gram(s) IV Push once  dextrose 50% Injectable 25 Gram(s) IV Push once  dextrose 50% Injectable 25 Gram(s) IV Push once  epoetin odalis (EPOGEN) Injectable 93305 Unit(s) SubCutaneous <User Schedule>  erythromycin   Ointment 1 Application(s) Left EYE four times a day  ferrous    sulfate Liquid 300 milliGRAM(s) Oral daily  glucagon  Injectable 1 milliGRAM(s) IntraMuscular once  hydrocortisone sodium succinate Injectable 25 milliGRAM(s) IV Push every 8 hours  insulin lispro (ADMELOG) corrective regimen sliding scale   SubCutaneous every 6 hours  insulin NPH human recombinant 11 Unit(s) SubCutaneous every 6 hours  meropenem  IVPB 500 milliGRAM(s) IV Intermittent every 24 hours  midodrine. 30 milliGRAM(s) Oral every 8 hours  ofloxacin 0.3% Solution 1 Drop(s) Left EYE every 4 hours  pantoprazole  Injectable 40 milliGRAM(s) IV Push two times a day  petrolatum Ophthalmic Ointment 1 Application(s) Right EYE two times a day  polyethylene glycol 3350 17 Gram(s) Oral two times a day  psyllium Powder 1 Packet(s) Oral daily  senna Syrup 10 milliLiter(s) Oral daily  sodium chloride 2 Gram(s) Oral two times a day  valACYclovir 500 milliGRAM(s) Oral every 24 hours      PHYSICAL EXAM:  T(C): 37 (10-01-23 @ 16:58), Max: 37.5 (10-01-23 @ 04:00)  HR: 114 (10-01-23 @ 16:58) (84 - 117)  BP: 129/42 (10-01-23 @ 16:58) (104/62 - 131/38)  RR: 22 (10-01-23 @ 16:58) (20 - 22)  SpO2: 96% (10-01-23 @ 16:58) (94% - 100%)  Wt(kg): --  I&O's Summary    30 Sep 2023 07:01  -  01 Oct 2023 07:00  --------------------------------------------------------  IN: 1810 mL / OUT: 2400 mL / NET: -590 mL    01 Oct 2023 07:01  -  01 Oct 2023 20:52  --------------------------------------------------------  IN: 510 mL / OUT: 0 mL / NET: 510 mL        Appearance: NAD, +trach  HEENT: dry oral mucosa  Lymphatic: No lymphadenopathy  Cardiovascular: Normal S1 S2, No JVD, No murmurs, No edema  Respiratory: Decreased BS, + trach to vent	  Neuro: opens eyes  Gastrointestinal: Soft, Non-tender, + BS, + NGT  Skin: No rashes, No ecchymoses, No cyanosis	  Extremities: No strength/ROM 2/2 sedation, + BL LE edema  Vascular: Peripheral pulses palpable 2+ bilaterally  Mode: AC/ CMV (Assist Control/ Continuous Mandatory Ventilation), RR (machine): 22, TV (machine): 340, FiO2: 35, PEEP: 8, ITime: 0.81, MAP: 20, PIP: 55          LABS:    CARDIAC MARKERS:                                7.1    8.76  )-----------( 348      ( 01 Oct 2023 00:35 )             22.0     10-01    136  |  97<L>  |  45<H>  ----------------------------<  126<H>  3.7   |  27  |  1.47<H>    Ca    8.6      01 Oct 2023 00:35  Phos  1.8     10-01  Mg     2.10     10-01    TPro  5.6<L>  /  Alb  2.3<L>  /  TBili  0.3  /  DBili  x   /  AST  53<H>  /  ALT  19  /  AlkPhos  307<H>  10-01        TELEMETRY: 	    ECG:  	  RADIOLOGY:   DIAGNOSTIC TESTING:  [ ] Echocardiogram:  [ ]  Catheterization:  [ ] Stress Test:    OTHER:

## 2023-10-01 NOTE — CHART NOTE - NSCHARTNOTEFT_GEN_A_CORE
RCU Accept Note    Transfer from: (  ) Medicine    (  ) Telemetry     (   ) RCU        (    ) Palliative         (   ) Stroke Unit          ( x ) MICU    Accepting physician: Dr. Canela    HPI:  74 y/o F DM on insulin, HTN, CKD,breast CA, respiratory arrest and cardiac arrest (2018), CVA with residual weakness, aspiration PNA h/o trache, PEG, both removed presents with respiratory distress requiring intubation. Had recent admission d/c 7/22/23 for cough and respiratory distress (no intubation) thought to be i/s/o aspiration PNA s/p cefepime course; developed rash in response. Infectious w/u was negative at this time. Was discharged home, daughter and  at bedside providing history.     Reports that she was initially improving with O2 sats in the high 90s on room air, however, then became more lethargic and not herself (baseline mental status AOx3). Also had worsening shortness of breath while laying down and leg swelling. Contacted cardiologist who started her on bumex 1mg daily. Developed low sugars overnight before admission and was given juice with some food, daughter reports no coughing during episode. At around 4-5 AM developed vomiting and coughing, O2 sats dropped to 80s and EMS was called. Denies fevers/chills, dysuria or urinary frequency, chest pain, palpitations. Uses wheelchair at home however family moves her around frequently.     In ED, was on BiPAP with increased WOB and was intubated. Found to have elevated pro-BNP and CO2 of 111 on VBG. CXR with small right pleural effusion, started on vanc in the ED.  (11 Aug 2023 09:08)      OBJECTIVE DATA:    Vital Signs Last 24 Hrs  T(C): 37.1 (01 Oct 2023 08:00), Max: 37.5 (01 Oct 2023 04:00)  T(F): 98.8 (01 Oct 2023 08:00), Max: 99.5 (01 Oct 2023 04:00)  HR: 110 (01 Oct 2023 08:00) (84 - 118)  BP: 131/38 (01 Oct 2023 08:00) (108/34 - 131/38)  BP(mean): 51 (01 Oct 2023 08:00) (51 - 86)  RR: 20 (01 Oct 2023 08:00) (16 - 22)  SpO2: 97% (01 Oct 2023 08:00) (96% - 100%)    Parameters below as of 01 Oct 2023 08:00  Patient On (Oxygen Delivery Method): ventilator    O2 Concentration (%): 30  I&O's Summary    30 Sep 2023 07:01  -  01 Oct 2023 07:00  --------------------------------------------------------  IN: 1810 mL / OUT: 2400 mL / NET: -590 mL    01 Oct 2023 07:01  -  01 Oct 2023 10:06  --------------------------------------------------------  IN: 70 mL / OUT: 0 mL / NET: 70 mL        Allergies:      MEDICATIONS  (STANDING):  acetaminophen   IVPB .. 1000 milliGRAM(s) IV Intermittent once  acetic acid 0.25% Topical Irrigation 1 Application(s) Topical two times a day  albuterol/ipratropium for Nebulization 3 milliLiter(s) Nebulizer every 6 hours  apixaban 10 milliGRAM(s) Oral every 12 hours  artificial tears (preservative free) Ophthalmic Solution 1 Drop(s) Right EYE every 2 hours  aspirin  chewable 81 milliGRAM(s) Enteral Tube daily  atorvastatin 10 milliGRAM(s) Oral at bedtime  chlorhexidine 0.12% Liquid 15 milliLiter(s) Oral Mucosa every 12 hours  chlorhexidine 2% Cloths 1 Application(s) Topical daily  DAPTOmycin IVPB 500 milliGRAM(s) IV Intermittent every 48 hours  dextrose 5%. 1000 milliLiter(s) (100 mL/Hr) IV Continuous <Continuous>  dextrose 5%. 1000 milliLiter(s) (50 mL/Hr) IV Continuous <Continuous>  dextrose 50% Injectable 12.5 Gram(s) IV Push once  dextrose 50% Injectable 25 Gram(s) IV Push once  dextrose 50% Injectable 25 Gram(s) IV Push once  epoetin odalis (EPOGEN) Injectable 72166 Unit(s) SubCutaneous <User Schedule>  erythromycin   Ointment 1 Application(s) Left EYE four times a day  ferrous    sulfate Liquid 300 milliGRAM(s) Oral daily  glucagon  Injectable 1 milliGRAM(s) IntraMuscular once  hydrocortisone sodium succinate Injectable 25 milliGRAM(s) IV Push every 8 hours  insulin lispro (ADMELOG) corrective regimen sliding scale   SubCutaneous every 6 hours  insulin NPH human recombinant 11 Unit(s) SubCutaneous every 6 hours  meropenem  IVPB 500 milliGRAM(s) IV Intermittent every 24 hours  midodrine. 30 milliGRAM(s) Oral every 8 hours  ofloxacin 0.3% Solution 1 Drop(s) Left EYE every 2 hours  pantoprazole  Injectable 40 milliGRAM(s) IV Push two times a day  petrolatum Ophthalmic Ointment 1 Application(s) Right EYE two times a day  polyethylene glycol 3350 17 Gram(s) Oral two times a day  psyllium Powder 1 Packet(s) Oral daily  senna Syrup 10 milliLiter(s) Oral daily  sodium chloride 2 Gram(s) Oral two times a day  valACYclovir 500 milliGRAM(s) Oral every 24 hours    MEDICATIONS  (PRN):  acetaminophen   Oral Liquid .. 650 milliGRAM(s) Oral every 6 hours PRN Temp greater or equal to 38C (100.4F), Mild Pain (1 - 3)  artificial tears (preservative free) Ophthalmic Solution 1 Drop(s) Both EYES three times a day PRN Dry Eyes  calamine/zinc oxide Lotion 1 Application(s) Topical two times a day PRN Rash and/or Itching  dextrose Oral Gel 15 Gram(s) Oral once PRN Blood Glucose LESS THAN 70 milliGRAM(s)/deciliter  sodium chloride 0.9% lock flush 10 milliLiter(s) IV Push every 1 hour PRN Pre/post blood products, medications, blood draw, and to maintain line patency      LABS                                            7.1                   Neurophils% (auto):   x      (10-01 @ 00:35):    8.76 )-----------(348          Lymphocytes% (auto):  x                                             22.0                   Eosinphils% (auto):   x        Manual%: Neutrophils x    ; Lymphocytes x    ; Eosinophils x    ; Bands%: x    ; Blasts x                                    136    |  97     |  45                  Calcium: 8.6   / iCa: x      (10-01 @ 00:35)    ----------------------------<  126       Magnesium: 2.10                             3.7     |  27     |  1.47             Phosphorous: 1.8      TPro  5.6    /  Alb  2.3    /  TBili  0.3    /  DBili  x      /  AST  53     /  ALT  19     /  AlkPhos  307    01 Oct 2023 00:35    ( 10-01 @ 00:35 )   PT: x    ;   INR: x      aPTT: 28.9 sec        ASSESSMENT & PLAN:     74 YO F with PMHx of IDDM, HTN, CKD, HFpEF, Breast Cancer s/p BL mastectomy, chemotherapy and radiation (2018), Respiratory and Cardiac Arrest (2018), 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 While in MICU patient noted with worsening renal failure requiring HD initiation, CGE/ Melena s/p EGD 8/31 found with esophagitis and erosive gastropathy, new right cerebellar infarct and fevers second to ESBL COLI and MSSA VAP, MSSA bacteremia, left ear otitis externa and drug rxn to cephalosporins Course further complicated by prolonged vent time s/p tracheostomy 9/1 and transferred to RCU 9/3 completed by recurrent fevers with high PIP, respiratory acidosis and concern for volume overloaded.     NEUROLOGY  # Metabolic Encephalopath vs NEW cerebella infarct   - Baseline MS AOX3 with short term memory changes per family however noted with concern for poor mentation in ICU  - MRI (8/17) with NEW right cerebellar infarct and old left PCA/occipital infarcts;  - STEPHANIE (8/17) with AV showing two linear mobile echogenic elements attached to valve leaflets consistent with Lambel's excrescence, but no TRINITY thrombus, and lambel's excrescence with uncertain clinical implication.   - EEG (8/11-8/15) with no seizures   - ILR interrogation (9/7) with SR and PACs falsely recorded as AF.   - Attempted to resume ASA for medical management but held given GIB   - Continue Lipitor   - Course complicated by questionable head and body shaking 9/8 prolactin elevated, however was shaking head yes/no to some questions.   - rCTH 9/26 for worsening encephalopathy-  Vague questionable areas of hypoattenuation within the bilateral cerebellar hemispheres which may be compatible with acute/subacute ischemia. Recommend further evaluation with a brain MRI study, provided there are no MRI contraindications. No acute intracranial hemorrhage. Previously seen questionable vague wedge-shaped area of hypoattenuation in the left parietal lobe with artifactual secondary to motion and volume averaging with a prominent sulcus. Chronic left occipital lobe infarct.  - neuro consulted- reccs: New CTH from 9/26 without any evidence of acute infarct -> can consider repeat CTH down the line in mental status does not improve with treatment of underlying metabolic derangements and infections.  -- ECHO (9/6) unable to rule out endocarditis, repeat TTE to evaluate to endocarditis   -- started on ASA 81 mg  __ bleeding resolved and pt has not been transfused since 9/19     CARDIOVASCULAR  # HTN Urgency   # Septic vs vasoplegic shock   - weaned off levophed on 9/28 at 7:55 am, required 0.01 Levophed yesterday with HD on 9/28,   -- repeat HD today --tolerated without any pressors     - Holding Labetalol, Catapres , Cardura   - started stress does steroids (9/27 - Solu_Cortef 50 TID, tapered to 25 mg TID (9/29)  -- discontinue Bumex, there is no improvement in UOP   -- - last PRBC transfusion on 9/19 -- started ASA on 9/29    # Hx of HFpEF with likely ADHF with volume overload.   - ECHO (7/2023) with EF 59 with severe LVH and diastolic dysfunction   - STEPHANIE (8/18) with normal LVRVSF however noted with increased LA pressures and persistent LA septal bowing into RA.   - ECHO (8/11) with EF 60 with mild LVH, normal LVRVSF, and mild ddfxn.  - Remove volume with HD sessions and intermittent bumex pushes as BP allows    - discontinue Bumex 2mg --  UOP is minimal  and cont HD sessions    HEENT   # Left ear OE   - ENT evaluation (9/7) with no mastoid tenderness, however found with positive swelling and excoriation to left auricle and tenderness to manipulation of auricle. Debrided crusting of auricle and EAC evaluated with edema and unable to visualize TM. EAC debrided and found with watery exudate.   - s/p CiproHC (9/5 - 9/16)   - Ana Cristina discontinued. Rash on torso concerning for possible drug rash  - ENT following and ear wick change QD, Wick was removed  - ENT recommending CT IAC w/ IV con but family is refusing as concerned given CKD, kidneys wouldn't recover from IV contrast. Spoke with Nephrology, ENT, and patient's  at length. Planned for CT non con and per , decision will be made from there but for now doesn't want to risk further harming kidneys.  - CT IAC showing acute on chronic left-sided otitis externa and acute on chronic left-sided otomastoiditis. Superimposed left-sided tympanosclerosis. Superimposed cholesteatoma formation within the left tympanomastoid cavity cannot be excluded. No evidence of malignant otitis externa.  - ENT consulted (9/20) for white purulent discharge from left ear, recc: ENT: acetic acid drops BID to left ear. Mupirocin ointment to the left pinna BID, cover with xeroform after placing mupirocin ointment. Pressure offloading of the left ear. No plan for any surgical intervention from ENT at this time, ENT will sign off    # + L eye redness in setting of surrounding infectious process - Ophthalmology following, see in ID ,   -- L eye __ possible endophthalmitis, possible HSV  -- Continue Ofloxacin 1 drop left eye 6 times a day (ordered by ophtho)  - Continue erythromycin ointment 4 times a day in the left eye (ordered by ophtho)  - PLEASE TAPE LEFT EYE SHUT AT ALL TIMES (can remove tape when administering eye drops)  - Continue preservative free artificial tears 4 times a day in the right eye (ordered by ophtho)  - Continue Lacrilube ointment twice a day in the right eye     # Oral Lesions with questionable Zoster vs Trauma?   - Patient with tongue pain and seen with anterior ulcerations consolidating and crusting and posterior ulceration.  - Case discussed with pathology resident and culture/ cytology sent.   - HSV negative and Path (9/6) with normal appearing epithelial cells singly and in clusters devoid of viral cytopathic effect.   - Continue on magic mouth wash for pain    RESPIRATORY  # Acute hypoxic/hypercapnic Respiratory failure likely iso volume overload   - Hx of CKD and HFpEF presented with SOB and hypercarbia second to volume overload requiring intubation and HD initiation   - Vent weaning attempted however limited requiring tracheostomy (9/1) with IP   - Course complicated by PIPs elevated (50s) and respiratory acidosis second to secretions vs volume ?    - Vent settings: 22/340/8/40%,   -- VBG 7.35/49. minimal secretions   - POCUS (9/8) with BL pleural effusions (large on right and small on left)   - CT CHEST (9/8) with TBM, BL pleural effusions and continued consolidations   - (9/27) Over the past 24 hrs, pt with increasing O2 requirements and respiratory acidosis with poor TVs. Bedside bronchoscopy (+) severe dynamic airway collapse into distal end of tracheostomy extending to main ines and RM bronchus.  tracheostomy exchanged at bedside to size 6dXLT with repeat bronchoscopy showing some but not complete improvement in dynamic collapse.   - Continue volume removal with HD   - Continue on Duoneb for airway clearance        GI  # UGIB   - CGE x 1 episode on 8/24 and melena x 2 episodes on 8/31 likely residual blood.   - EGD (8/31) found with esophagitis and erosive gastropathy  - last PRBC transfusion on 9/19   - Continue on PPI BID through late October and then PPI QD   - No melena     # Dysphagia   - NGT-TF continued, changed to R nostril today given infections on L side  - Pending PEG however needs improvement in infectious status prior to placement.   -- pt is tolerating TF, + BM on 9/28,   --restarted BR     RENAL  # Progressive CKD   - Presented volume overload and progressive RUSS on CKD (baseline CRE in 2s and mode into the 3s on admission) likely obstruction vs low flow state with shock vs AIN from drug reaction issue?  - POCUS with no signs of hydronephrosis   - s/p Pressor support started with improvement in renal function  - Attempted steroid course in ICU without improvement in renal function   - HD inititated in ICU for volume overload, held 9/20 iso malfunctioning shiley and stable labs/vol status   - iso worsening hypoxia/respiratory acidosis, L IJ shiley placed 9/27 and HD restarted. s/p 1.3L fluid removal 9/27.   -- HD on 9/27-- neg 1.3 L net and 9/28 with 1.8 L net neg, pt voided 195 over past 24 hrs, with 10-5 cc/ voiding per hr and UOP for LOS is + 23 L, and for 24 hrs is pos 1.2 L, s/p repeat HD session-- with 2 L net negative .   - renal following  - Monitor renal function and UOP, and electrolytes   -- willard placed on ICU admission, __ d/cd willard yesterday, did not void, UOP has been minimal, _ bladder scan and no willard is indicated       Hyponatremia   - c/w Salt tabs - s/p Hypertonic 1.5%NS @ 50cc/hr x 5 hr;  --as per nephrology, Hyponatremia - low but acceptable, on NaCl tabs + intermittent HD    # Hyperphosphatemia (resolved)   - PTH normal and elevated phos likely from renal failure  - s/p Phos binder with Renvela - now d'beth as level wnl      INFECTIOUS DISEASE  # ESBL ECOLI and MSSA VAP c/b MSSA bacteremia   - Course complicated by recurrent fevers, T max 100 today and 101.1 yesterday, last temp prior -- 100.5 on 9/20, WBC 8   - RVP/ COVID (8/11) negative   - SCx (8/11) with MSSA s/p cefepime (8/12-8/13) and then ancef (8/14) however noted with drug reaction and then changed to vanco and aztreonam (8/12-8/13) in ICU .   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  - s/p Ana Cristina (9/4 - 9/22 ) and Vanco by level (last dose 9/22 ) , pt noted w/ new erythematous patchy rash, did not improve after discontinuing the ana cristina, so vanco switched to changed to Daptomycin per ID recs (9/26-), monitor CK's  - s/p fluconazole 200 qd (9/219/28) Dc'd per ID recs given family states rash appeared post fluconazole initiation  - BCx 9/26- NTD, Urine Cx 9/26- NTD, MRSA pcr+ staph  -- recurrent F with 101.1 on 9/29 __ repeat blood cxs sent- -on 9/29 -- follow up, sputum cx sent  -Continue Daptomycin (plan for 4 weeks through 10/2)  -Sputum 9/29 Cx with pseudomonas/proteus  -- as per discussion with ID, added meropenem empirically (9/29 -  ) and started on Valtrex (9/29- ) as per ophthalmology recs (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 Debrided crusting of auricle and EAC evaluated with edema and unable to visualize TM. EAC debrided and found with watery exudate.   - s/p CiproHC (9/5 - 9/16)   -- ENT signed off     # MRSA PCRs   - MRSA PCR (8/11 and 8/14) negative. 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   - Spoke with GI for clearance to start Heparin gtt for + DVT   - c/w Heparin gtt (9/21-), aPTT are therapeutic-- 62>65, pt's specific-- aPTT goal 60-85, aPTT daily    Vascular  # DVT  - Pt with + popliteal DVT on SCD Spoke with GI no absolute contraindication for starting Heparin - advise close monitoring for bleeding - Do stat CTA if bleeding occurs and call IR   - c/w Heparin gtt (9/21-)    ONC   # Hx of Breast CA   - Patient dx in 2018 and s/p BL mastectomy (radical on right) and chemo and RT.   - Supportive care.     ENDOCRINE  # IDDM2   - Monitor FS and adjust as needed   - started stress dose steroids 9/27, tepering down to 25 mg TID today, monitor FS -- 251>237   - -increased NPH to 18 U q 6 hrs, continue ISS     LINES/ TUBES  - R ARTHUR ODOM (8/19 - 9/21)   - L ARTHUR odom (9/27 - )  -- PIV's     SKIN  # Drug Eruption   - Attempted cefepime (8/12) vs ancef (8/13-8/14) and then noted with drug rxn.   - Hold further cephalosporins at this time   - s/p Steroids with prednisone 40 (8/18 - 9/2) then prednisone 30 (9/3-9/7), then prednisone 20 (9/8 - 9/10), then prednisone 10mg (9/11-9/13)    ETHICS/ GOC    - FULL CODE   -- pt's spouse and daughter are at the bedside and were updated on pt;s condition, all questions were answered      Rudi DUNBAR [id number]

## 2023-10-01 NOTE — PROGRESS NOTE ADULT - ASSESSMENT
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 While in MICU patient noted with worsening renal failure requiring HD initiation, CGE/ Melena s/p EGD 8/31 found with esophagitis and erosive gastropathy, new right cerebellar infarct and fevers second to ESBL COLI and MSSA VAP, MSSA bacteremia, left ear otitis externa and drug rxn to cephalosporins Course further complicated by prolonged vent time s/p tracheostomy 9/1 and transferred to RCU 9/3 completed by recurrent fevers with high PIP, respiratory acidosis and concern for volume overloaded.     NEUROLOGY  # Metabolic Encephalopath vs NEW cerebella infarct   - Baseline MS AOX3 with short term memory changes per family however noted with concern for poor mentation in ICU  -# L PCA stroke, ESUS s/p ILR, also with bilateral centrum semiovale watershed infarcts   Multiple embolic strokes on MRI 8/17 - R cerebellum, midbrain, multiple other tiny embolic infarct. Neuro- doubt new stroke.  - STEPHANIE (8/17) with AV showing two linear mobile echogenic elements attached to valve leaflets consistent with Lambel's excrescence, but no TRINITY thrombus, and lambel's excrescence with uncertain clinical implication.   - EEG (8/11-8/15) with no seizures, ammonia 14    - ILR interrogation (9/7) with SR and PACs falsely recorded as AF.   - 9/29 -- resumed ASA for medical management (prior held given GIB,  bleeding resolved and pt has not been transfused since 9/19)  - Continue Lipitor   - Course complicated by questionable head and body shaking 9/8 prolactin elevated, however was shaking head yes/no to some questions.   - [facility] 9/26 for worsening encephalopathy-  Vague questionable areas of hypoattenuation within the bilateral cerebellar hemispheres which may be compatible with acute/subacute ischemia. Recommend further evaluation with a brain MRI study, provided there are no MRI contraindications. No acute intracranial hemorrhage. Previously seen questionable vague wedge-shaped area of hypoattenuation in the left parietal lobe with artifactual secondary to motion and volume averaging with a prominent sulcus. Chronic left occipital lobe infarct.  - neuro consulted- reccs: New CTH from 9/26 without any evidence of acute infarct -> can consider repeat CTH down the line in mental status does not improve with treatment of underlying metabolic derangements and infections.  -- ECHO (9/6) unable to rule out endocarditis, repeat TTE to evaluate to endocarditis   -- Neurology following     CARDIOVASCULAR  # HTN Urgency   # Septic vs vasoplegic shock   - weaned off levophed on 9/28 at 7:55 am, required 0.01 Levophed with HD on 9/28, tolerated HD yesterday without vasopressors requirements,   - Holding Labetalol, Catapres , Cardura   - started stress does steroids (9/27 - Solu-Cortef 50 TID, tapered to 25 mg TID (9/29), taper tomorrow to 25 daily x 3 days  -- discontinued Bumex, there was no improvement in UOP   -- - last PRBC transfusion on 9/19 -- started ASA on 9/29 for medical mgmt (multiple strokes)  -- continue Midodrine 30 TID and taper as able     # Hx of HFpEF with likely ADHF with volume overload.   - ECHO (7/2023) with EF 59 with severe LVH and diastolic dysfunction   - STEPHANIE (8/18) with normal LVRVSF however noted with increased LA pressures and persistent LA septal bowing into RA.   - ECHO (8/11) with EF 60 with mild LVH, normal LVRVSF, and mild ddfxn.      HEENT   # Left ear OE   - ENT evaluation (9/7) with no mastoid tenderness, however found with positive swelling and excoriation to left auricle and tenderness to manipulation of auricle. Debrided crusting of auricle and EAC evaluated with edema and unable to visualize TM. EAC debrided and found with watery exudate.   - s/p CiproHC (9/5 - 9/16)   - Ana Cristina discontinued. Rash on torso concerning for possible drug rash  - ENT following and ear wick change QD, Wick was removed,  - ENT recommending CT IAC w/ IV con but family is refusing as concerned given CKD, kidneys wouldn't recover from IV contrast. Spoke with Nephrology, ENT, and patient's  at length. Planned for CT non con and per , decision will be made from there but for now doesn't want to risk further harming kidneys.  - CT IAC showing acute on chronic left-sided otitis externa and acute on chronic left-sided otomastoiditis. Superimposed left-sided tympanosclerosis. Superimposed cholesteatoma formation within the left tympanomastoid cavity cannot be excluded. No evidence of malignant otitis externa.  - ENT consulted (9/20) for white purulent discharge from left ear, recc: ENT: acetic acid drops BID to left ear. Mupirocin ointment to the left pinna BID, cover with xeroform after placing mupirocin ointment. Pressure offloading of the left ear. No plan for any surgical intervention from ENT at this time, ENT will sign off    # + L eye redness in setting of surrounding infectious process - Ophthalmology following, see in ID ,   -- L eye __ possible endophthalmitis vs HSV keratitis  -- Continue Ofloxacin 1 drop left eye 6 times a day (ordered by ophtho)  - Continue erythromycin ointment 4 times a day in the left eye (ordered by ophtho)  - PLEASE TAPE LEFT EYE SHUT AT ALL TIMES (can remove tape when administering eye drops)  - Continue preservative free artificial tears 4 times a day in the right eye (ordered by ophtho)  - Continue Lacrilube ointment twice a day in the right eye     # Oral Lesions with questionable Zoster vs Trauma?   - Patient with tongue pain and seen with anterior ulcerations consolidating and crusting and posterior ulceration.  - Case discussed with pathology resident and culture/ cytology sent.   - HSV negative and Path (9/6) with normal appearing epithelial cells singly and in clusters devoid of viral cytopathic effect.   - Continue on magic mouth wash for pain    RESPIRATORY  # Acute hypoxic/hypercapnic Respiratory failure likely iso volume overload   - Hx of CKD and HFpEF presented with SOB and hypercarbia second to volume overload requiring intubation and HD initiation   - Vent weaning attempted however limited requiring tracheostomy (9/1) with IP   - Course complicated by PIPs elevated (50s) and respiratory acidosis second to secretions vs volume ?    - Vent settings: 22/340/8/40%,   -- VBG 7.35/49. minimal secretions   - POCUS (9/8) with BL pleural effusions (large on right and small on left)   - CT CHEST (9/8) with TBM, BL pleural effusions and continued consolidations   - (9/27) Over the past 24 hrs, pt with increasing O2 requirements and respiratory acidosis with poor TVs. Bedside bronchoscopy (+) severe dynamic airway collapse into distal end of tracheostomy extending to main ines and RM bronchus.  tracheostomy exchanged at bedside to size 6dXLT with repeat bronchoscopy showing some but not complete improvement in dynamic collapse.   - Continue volume removal with HD   - Continue on Duoneb for airway clearance        GI  # UGIB   - CGE x 1 episode on 8/24 and melena x 2 episodes on 8/31 likely residual blood.   - EGD (8/31) found with esophagitis and erosive gastropathy  - last PRBC transfusion on 9/19   - Continue on PPI BID through late October and then PPI QD   - No melena     # Dysphagia   - NGT-TF continued, changed to R nostril today given infections on L side  - Pending PEG however needs improvement in infectious status prior to placement.   -- pt is tolerating TF, + BM on 9/28,   --restarted BR     RENAL  # Progressive CKD   - Presented volume overload and progressive RUSS on CKD (baseline CRE in 2s and mode into the 3s on admission) likely obstruction vs low flow state with shock vs AIN from drug reaction issue?  - POCUS with no signs of hydronephrosis   - s/p Pressor support started with improvement in renal function  - Attempted steroid course in ICU without improvement in renal function   - HD inititated in ICU for volume overload, held 9/20 iso malfunctioning shiley and stable labs/vol status   - iso worsening hypoxia/respiratory acidosis, L IJ shiley placed 9/27 and HD restarted. s/p 1.3L fluid removal 9/27.   -- HD on 9/27-- neg 1.3 L net and 9/28 with 1.8 L net neg, pt voided 195 over past 24 hrs, with 10-5 cc/ voiding per hr and UOP for LOS is + 23 L, and for 24 hrs is pos 1.2 L, s/p repeat HD session-- with 2 L net negative .   - renal following  - Monitor renal function and UOP, and electrolytes   -- willard placed on ICU admission, __ d/cd willard yesterday, did not void, UOP has been minimal, _ bladder scan and no willard is indicated       Hyponatremia   - c/w Salt tabs - s/p Hypertonic 1.5%NS @ 50cc/hr x 5 hr;  --as per nephrology, Hyponatremia - low but acceptable, on NaCl tabs + intermittent HD    # Hyperphosphatemia (resolved)   - PTH normal and elevated phos likely from renal failure  - s/p Phos binder with Renvela - now d'beth as level wnl      INFECTIOUS DISEASE  # ESBL ECOLI and MSSA VAP c/b MSSA bacteremia   - Course complicated by recurrent fevers, T max 100 today and 101.1 yesterday, last temp prior -- 100.5 on 9/20, WBC 8   - RVP/ COVID (8/11) negative   - SCx (8/11) with MSSA s/p cefepime (8/12-8/13) and then ancef (8/14) however noted with drug reaction and then changed to vanco and aztreonam (8/12-8/13) in ICU .   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  - s/p Ana Cristina (9/4 - 9/22 ) and Vanco by level (last dose 9/22 ) , pt noted w/ new erythematous patchy rash, did not improve after discontinuing the ana cristina, so vanco switched to changed to Daptomycin per ID recs (9/26-), monitor CK's  - s/p fluconazole 200 qd (9/219/28) Dc'd per ID recs given family states rash appeared post fluconazole initiation  - BCx 9/26- NTD, Urine Cx 9/26- NTD, MRSA pcr+ staph  -- recurrent F with 101.1 on 9/29 __ repeat blood cxs sent- -on 9/29 -- follow up, sputum cx sent  -Continue Daptomycin (plan for 4 weeks through 10/2)  -Sputum 9/29 Cx with pseudomonas/proteus  -- as per discussion with ID, added meropenem empirically (9/29 -  ) and started on Valtrex (9/29- ) as per ophthalmology recs (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 Debrided crusting of auricle and EAC evaluated with edema and unable to visualize TM. EAC debrided and found with watery exudate.   - s/p CiproHC (9/5 - 9/16)   -- ENT signed off     # MRSA PCRs   - MRSA PCR (8/11 and 8/14) negative. 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   - Spoke with GI for clearance to start Heparin gtt for + DVT   - c/w Heparin gtt (9/21-), aPTT are therapeutic-- 62>65, pt's specific-- aPTT goal 60-85, aPTT daily    Vascular  # DVT  - Pt with + popliteal DVT on SCD Spoke with GI no absolute contraindication for starting Heparin - advise close monitoring for bleeding - Do stat CTA if bleeding occurs and call IR   - c/w Heparin gtt (9/21-)    ONC   # Hx of Breast CA   - Patient dx in 2018 and s/p BL mastectomy (radical on right) and chemo and RT.   - Supportive care.     ENDOCRINE  # IDDM2   - Monitor FS and adjust as needed   - started stress dose steroids 9/27, tepering down to 25 mg TID today, monitor FS -- 251>237   - -increased NPH to 18 U q 6 hrs, continue ISS     LINES/ TUBES  - R ARTHUR TAYLOR (8/19 - 9/21)   - L ARTHUR taylor (9/27 - )  -- PIV's     SKIN  # Drug Eruption   - Attempted cefepime (8/12) vs ancef (8/13-8/14) and then noted with drug rxn.   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 While in MICU patient noted with worsening renal failure requiring HD initiation, CGE/ Melena s/p EGD 8/31 found with esophagitis and erosive gastropathy, new right cerebellar infarct and fevers second to ESBL COLI and MSSA VAP, MSSA bacteremia, left ear otitis externa and drug rxn to cephalosporins Course further complicated by prolonged vent time s/p tracheostomy 9/1 and transferred to RCU 9/3 completed by recurrent fevers with high PIP, respiratory acidosis and concern for volume overloaded.     NEUROLOGY  # Metabolic Encephalopath vs NEW cerebella infarct   - Baseline MS AOX3 with short term memory changes per family however noted with concern for poor mentation in ICU  -# L PCA stroke, ESUS s/p ILR, also with bilateral centrum semiovale watershed infarcts   Multiple embolic strokes on MRI 8/17 - R cerebellum, midbrain, multiple other tiny embolic infarct. Neuro- doubt new stroke.  - STEPHANIE (8/17) with AV showing two linear mobile echogenic elements attached to valve leaflets consistent with Lambel's excrescence, but no TRINITY thrombus, and lambel's excrescence with uncertain clinical implication.   - EEG (8/11-8/15) with no seizures, ammonia 14    - ILR interrogation (9/7) with SR and PACs falsely recorded as AF.   - 9/29 -- resumed ASA for medical management (prior held given GIB,  bleeding resolved and pt has not been transfused since 9/19)  - Continue Lipitor   - Course complicated by questionable head and body shaking 9/8 prolactin elevated, however was shaking head yes/no to some questions.   - [facility] 9/26 for worsening encephalopathy-  Vague questionable areas of hypoattenuation within the bilateral cerebellar hemispheres which may be compatible with acute/subacute ischemia. Recommend further evaluation with a brain MRI study, provided there are no MRI contraindications. No acute intracranial hemorrhage. Previously seen questionable vague wedge-shaped area of hypoattenuation in the left parietal lobe with artifactual secondary to motion and volume averaging with a prominent sulcus. Chronic left occipital lobe infarct.  - neuro consulted- reccs: New CTH from 9/26 without any evidence of acute infarct -> can consider repeat CTH down the line in mental status does not improve with treatment of underlying metabolic derangements and infections.  -- Neurology following     CARDIOVASCULAR  # HTN Urgency   # Septic vs vasoplegic shock   - weaned off levophed on 9/28 at 7:55 am, required 0.01 Levophed with HD on 9/28, tolerated HD yesterday without vasopressors requirements,   - Holding Labetalol, Catapres , Cardura   - started stress does steroids (9/27 - Solu-Cortef 50 TID, tapered to 25 mg TID (9/29), taper tomorrow to 25 daily x 3 days  -- discontinued Bumex, there was no improvement in UOP   -- last PRBC transfusion on 9/19 -- started ASA on 9/29 for medical mgmt (multiple strokes)  -- continue Midodrine 30 TID and taper as able     # Hx of HFpEF with likely ADHF with volume overload.   - ECHO (7/2023) with EF 59 with severe LVH and diastolic dysfunction   - STEPHANIE (8/18) with normal LVRVSF however noted with increased LA pressures and persistent LA septal bowing into RA.   - ECHO (8/11) with EF 60 with mild LVH, normal LVRVSF, and mild ddfxn.    HEENT   # Left ear OE   - ENT evaluation (9/7) with no mastoid tenderness, however found with positive swelling and excoriation to left auricle and tenderness to manipulation of auricle. Debrided crusting of auricle and EAC evaluated with edema and unable to visualize TM. EAC debrided and found with watery exudate.   - s/p CiproHC (9/5 - 9/16)   - Ana Cristina discontinued. Rash on torso concerning for possible drug rash  - ENT following and ear wick change QD, Wick was removed,  - ENT recommending CT IAC w/ IV con but family is refusing as concerned given CKD, kidneys wouldn't recover from IV contrast. Spoke with Nephrology, ENT, and patient's  at length. Planned for CT non con and per , decision will be made from there but for now doesn't want to risk further harming kidneys.  - CT IAC showing acute on chronic left-sided otitis externa and acute on chronic left-sided otomastoiditis. Superimposed left-sided tympanosclerosis. Superimposed cholesteatoma formation within the left tympanomastoid cavity cannot be excluded. No evidence of malignant otitis externa.  - ENT consulted (9/20) for white purulent discharge from left ear, recc: ENT: acetic acid drops BID to left ear. Mupirocin ointment to the left pinna BID, cover with xeroform after placing mupirocin ointment. Pressure offloading of the left ear. No plan for any surgical intervention from ENT at this time, ENT will sign off    # + L eye redness in setting of surrounding infectious process - Ophthalmology following, see in ID ,   -- L eye __ possible endophthalmitis vs HSV keratitis  -- Continue Ofloxacin 1 drop left eye 6 times a day (ordered by ophtho)  - Continue erythromycin ointment 4 times a day in the left eye (ordered by ophtho)  - PLEASE TAPE LEFT EYE SHUT AT ALL TIMES (can remove tape when administering eye drops)  - Continue preservative free artificial tears 4 times a day in the right eye (ordered by ophtho)  - Continue Lacrilube ointment twice a day in the right eye     # Oral Lesions with questionable Zoster vs Trauma?   - Patient with tongue pain and seen with anterior ulcerations consolidating and crusting and posterior ulceration.  - Case discussed with pathology resident and culture/ cytology sent.   - HSV negative and Path (9/6) with normal appearing epithelial cells singly and in clusters devoid of viral cytopathic effect.   - Continue on magic mouth wash for pain    RESPIRATORY  # Acute hypoxic/hypercapnic Respiratory failure likely iso volume overload   - Hx of CKD and HFpEF presented with SOB and hypercarbia second to volume overload requiring intubation and HD initiation   - Vent weaning attempted however limited requiring tracheostomy (9/1) with IP   - Course complicated by PIPs elevated (50s) and respiratory acidosis second to secretions vs volume ?    - Vent settings: 22/340/8/40%,   -- VBG 7.35/49. minimal secretions   - POCUS (9/8) with BL pleural effusions (large on right and small on left)   - CT CHEST (9/8) with TBM, BL pleural effusions and continued consolidations   - (9/27) Over the past 24 hrs, pt with increasing O2 requirements and respiratory acidosis with poor TVs. Bedside bronchoscopy (+) severe dynamic airway collapse into distal end of tracheostomy extending to main ines and RM bronchus.  tracheostomy exchanged at bedside to size 6dXLT with repeat bronchoscopy showing some but not complete improvement in dynamic collapse.   - Continue volume removal with HD   - Continue on Duoneb for airway clearance     GI  # UGIB   - CGE x 1 episode on 8/24 and melena x 2 episodes on 8/31 likely residual blood.   - EGD (8/31) found with esophagitis and erosive gastropathy  - last PRBC transfusion on 9/19   - Continue on PPI BID through late October and then PPI QD   - No melena     # Dysphagia   - NGT-TF continued, changed to R nostril today given infections on L side  - Pending PEG however needs improvement in infectious status prior to placement.   -- pt is tolerating TF, + BM on 9/28,     RENAL  # Progressive CKD   - Presented volume overload and progressive RUSS on CKD (baseline CRE in 2s and mode into the 3s on admission) likely obstruction vs low flow state with shock vs AIN from drug reaction issue?  - POCUS with no signs of hydronephrosis   - s/p Pressor support started with improvement in renal function  - Attempted steroid course in ICU without improvement in renal function   - HD inititated in ICU for volume overload, held 9/20 iso malfunctioning shiley and stable labs/vol status   - iso worsening hypoxia/respiratory acidosis, L IJ shiley placed 9/27 and HD restarted. s/p 1.3L fluid removal 9/27.   -- HD on 9/27-- neg 1.3 L net and 9/28 with 1.8 L net neg, pt voided 195 over past 24 hrs, with 10-5 cc/ voiding per hr and UOP for LOS is + 23 L, and for 24 hrs is pos 1.2 L, s/p repeat HD session-- with 2 L net negative .   - renal following  - Monitor renal function and UOP, and electrolytes   -- willard placed on ICU admission, d/cd willard as did not void, UOP has been minimal, _ bladder scan and no willard is indicated     Hyponatremia   - c/w Salt tabs - s/p Hypertonic 1.5%NS @ 50cc/hr x 5 hr;  --as per nephrology, Hyponatremia - low but acceptable, on NaCl tabs + intermittent HD    # Hyperphosphatemia (resolved)   - PTH normal and elevated phos likely from renal failure  - s/p Phos binder with Renvela - now d'beth as level wnl      INFECTIOUS DISEASE  # ESBL ECOLI and MSSA VAP c/b MSSA bacteremia   - Course complicated by recurrent fevers, T max 100 today and 101.1 yesterday, last temp prior -- 100.5 on 9/20, WBC 8   - RVP/ COVID (8/11) negative   - SCx (8/11) with MSSA s/p cefepime (8/12-8/13) and then ancef (8/14) however noted with drug reaction and then changed to vanco and aztreonam (8/12-8/13) in ICU .   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  - s/p Ana Cristina (9/4 - 9/22 ) and Vanco by level (last dose 9/22 ) , pt noted w/ new erythematous patchy rash, did not improve after discontinuing the ana cristina, so vanco switched to changed to Daptomycin per ID recs (9/26-), monitor CK's  - s/p fluconazole 200 qd (9/219/28) Dc'd per ID recs given family states rash appeared post fluconazole initiation  - BCx 9/26- NTD, Urine Cx 9/26- NTD, MRSA pcr+ staph  -- recurrent F with 101.1 on 9/29 __ repeat blood cxs sent- -on 9/29 -- follow up, sputum cx sent  -Continue Daptomycin (plan for 4 weeks through 10/2)  -Sputum 9/29 Cx with pseudomonas/proteus  -- as per discussion with ID, added meropenem empirically (9/29 -  ) and started on Valtrex (9/29- ) as per ophthalmology recs (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 Debrided crusting of auricle and EAC evaluated with edema and unable to visualize TM. EAC debrided and found with watery exudate.   - s/p CiproHC (9/5 - 9/16)   -- ENT signed off     # MRSA PCRs   - MRSA PCR (8/11 and 8/14) negative. 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 Can switch back to heparin gtt when pt ready for PEG or procedure.    ONC   # Hx of Breast CA   - Patient dx in 2018 and s/p BL mastectomy (radical on right) and chemo and RT.   - Supportive care.     ENDOCRINE  # IDDM2   - Monitor FS and adjust as needed   - started stress dose steroids 9/27, tapering down to 25 mg TID through 10/1, Then planned for 25mg qd for 3 days starting 10/2   - -increased NPH to 18 U q 6 hrs, continue ISS     LINES/ TUBES  - R ARTHUR TAYLOR (8/19 - 9/21)   - L ARTHUR taylor (9/27 - )  -- PIV's     SKIN  # Drug Eruption   - Attempted cefepime (8/12) vs ancef (8/13-8/14) and then noted with drug rxn.   - Hold further cephalosporins at this time   - s/p Steroids with prednisone 40 (8/18 - 9/2) then prednisone 30 (9/3-9/7), then prednisone 20 (9/8 - 9/10), then prednisone 10mg (9/11-9/13)    ETHICS/ GOC    - FULL CODE   -- pt's spouse and daughter are at the bedside and were updated on pt;s condition, all questions were answered

## 2023-10-01 NOTE — PROGRESS NOTE ADULT - SUBJECTIVE AND OBJECTIVE BOX
Patient is a 73y old  Female who presents with a chief complaint of Respiratory distress (01 Oct 2023 20:52)      SUBJECTIVE / OVERNIGHT EVENTS: back in RCU, traceh to vent,  tolearting HD well, on [place], Dapto, off fluconazole 2/2 allergic rxn    MEDICATIONS  (STANDING):  acetaminophen   IVPB .. 1000 milliGRAM(s) IV Intermittent once  acetic acid 0.25% Topical Irrigation 1 Application(s) Topical two times a day  albuterol/ipratropium for Nebulization 3 milliLiter(s) Nebulizer every 6 hours  apixaban 10 milliGRAM(s) Oral every 12 hours  artificial tears (preservative free) Ophthalmic Solution 1 Drop(s) Right EYE every 4 hours  aspirin  chewable 81 milliGRAM(s) Enteral Tube daily  atorvastatin 10 milliGRAM(s) Oral at bedtime  chlorhexidine 0.12% Liquid 15 milliLiter(s) Oral Mucosa every 12 hours  chlorhexidine 2% Cloths 1 Application(s) Topical daily  DAPTOmycin IVPB 500 milliGRAM(s) IV Intermittent every 48 hours  dextrose 5%. 1000 milliLiter(s) (50 mL/Hr) IV Continuous <Continuous>  dextrose 5%. 1000 milliLiter(s) (100 mL/Hr) IV Continuous <Continuous>  dextrose 50% Injectable 12.5 Gram(s) IV Push once  dextrose 50% Injectable 25 Gram(s) IV Push once  dextrose 50% Injectable 25 Gram(s) IV Push once  epoetin odalis (EPOGEN) Injectable 98507 Unit(s) SubCutaneous <User Schedule>  erythromycin   Ointment 1 Application(s) Left EYE four times a day  ferrous    sulfate Liquid 300 milliGRAM(s) Oral daily  glucagon  Injectable 1 milliGRAM(s) IntraMuscular once  insulin lispro (ADMELOG) corrective regimen sliding scale   SubCutaneous every 6 hours  insulin NPH human recombinant 11 Unit(s) SubCutaneous every 6 hours  meropenem  IVPB 500 milliGRAM(s) IV Intermittent every 24 hours  midodrine. 30 milliGRAM(s) Oral every 8 hours  ofloxacin 0.3% Solution 1 Drop(s) Left EYE every 4 hours  pantoprazole  Injectable 40 milliGRAM(s) IV Push two times a day  petrolatum Ophthalmic Ointment 1 Application(s) Right EYE two times a day  polyethylene glycol 3350 17 Gram(s) Oral two times a day  psyllium Powder 1 Packet(s) Oral daily  senna Syrup 10 milliLiter(s) Oral daily  sodium chloride 2 Gram(s) Oral two times a day  sodium chloride 3%  Inhalation 4 milliLiter(s) Inhalation every 6 hours  valACYclovir 500 milliGRAM(s) Oral every 24 hours    MEDICATIONS  (PRN):  acetaminophen   Oral Liquid .. 650 milliGRAM(s) Oral every 6 hours PRN Temp greater or equal to 38C (100.4F), Mild Pain (1 - 3)  artificial tears (preservative free) Ophthalmic Solution 1 Drop(s) Both EYES three times a day PRN Dry Eyes  calamine/zinc oxide Lotion 1 Application(s) Topical two times a day PRN Rash and/or Itching  dextrose Oral Gel 15 Gram(s) Oral once PRN Blood Glucose LESS THAN 70 milliGRAM(s)/deciliter  sodium chloride 0.9% lock flush 10 milliLiter(s) IV Push every 1 hour PRN Pre/post blood products, medications, blood draw, and to maintain line patency      Vital Signs Last 24 Hrs  T(F): 100.5 (10-01-23 @ 22:37), Max: 100.5 (10-01-23 @ 22:37)  HR: 113 (10-01-23 @ 20:04) (85 - 117)  BP: 119/36 (10-01-23 @ 20:04) (104/62 - 131/38)  RR: 22 (10-01-23 @ 20:04) (20 - 22)  SpO2: 98% (10-01-23 @ 20:04) (94% - 100%)  Telemetry:   CAPILLARY BLOOD GLUCOSE      POCT Blood Glucose.: 159 mg/dL (01 Oct 2023 17:10)  POCT Blood Glucose.: 209 mg/dL (01 Oct 2023 11:31)  POCT Blood Glucose.: 158 mg/dL (01 Oct 2023 05:06)  POCT Blood Glucose.: 124 mg/dL (30 Sep 2023 23:46)    I&O's Summary    30 Sep 2023 07:01  -  01 Oct 2023 07:00  --------------------------------------------------------  IN: 1810 mL / OUT: 2400 mL / NET: -590 mL    01 Oct 2023 07:01  -  01 Oct 2023 23:28  --------------------------------------------------------  IN: 510 mL / OUT: 0 mL / NET: 510 mL        PHYSICAL EXAM:  GENERAL: NAD, well-developed  HEAD:  Atraumatic, Normocephalic  EYES: EOMI, PERRLA, conjunctiva and sclera clear  NECK: Supple, No JVD  CHEST/LUNG: Clear to auscultation bilaterally; No wheeze  HEART: Regular rate and rhythm; No murmurs, rubs, or gallops  ABDOMEN: Soft, Nontender, Nondistended; Bowel sounds present  EXTREMITIES:  2+ Peripheral Pulses, No clubbing, cyanosis, or edema  PSYCH: AAOx3  NEUROLOGY: non-focal  SKIN: No rashes or lesions    LABS:                        7.1    8.76  )-----------( 348      ( 01 Oct 2023 00:35 )             22.0     10-01    136  |  97<L>  |  45<H>  ----------------------------<  126<H>  3.7   |  27  |  1.47<H>    Ca    8.6      01 Oct 2023 00:35  Phos  1.8     10-01  Mg     2.10     10-01    TPro  5.6<L>  /  Alb  2.3<L>  /  TBili  0.3  /  DBili  x   /  AST  53<H>  /  ALT  19  /  AlkPhos  307<H>  10-01    PTT - ( 01 Oct 2023 00:35 )  PTT:28.9 sec      Urinalysis Basic - ( 01 Oct 2023 00:35 )    Color: x / Appearance: x / SG: x / pH: x  Gluc: 126 mg/dL / Ketone: x  / Bili: x / Urobili: x   Blood: x / Protein: x / Nitrite: x   Leuk Esterase: x / RBC: x / WBC x   Sq Epi: x / Non Sq Epi: x / Bacteria: x        RADIOLOGY & ADDITIONAL TESTS:    Imaging Personally Reviewed:    Consultant(s) Notes Reviewed:      Care Discussed with Consultants/Other Providers:

## 2023-10-01 NOTE — PROGRESS NOTE ADULT - NS ATTEND AMEND GEN_ALL_CORE FT
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 While in MICU patient noted with worsening renal failure requiring HD initiation, CGE/ Melena s/p EGD 8/31 found with esophagitis and erosive gastropathy, new right cerebellar infarct and fevers second to ESBL COLI and MSSA VAP, MSSA bacteremia, left ear otitis externa and drug rxn to cephalosporins Course further complicated by prolonged vent time s/p tracheostomy 9/1 and transferred to RCU 9/3 completed by recurrent fevers with high PIP, respiratory acidosis and concern for volume overloaded.   Now transferred back to RCU.  She is HD stable and afebrile today, and mental status is poor.  BUN/Cr improved post HD.  Now with galo Cueva.  She is completing a course of Daptomycin for MSSA bacteremia tomorrow.  There is concern for endocarditis although blood cultures are now negative.  Transthoracic echo did not show vegetations.  Remains also on Meropenam for empiric broader coverage in the setting of persistent fevers.  REmains on valcyte for keratitis.  Will discuss further with infectious disease.    Agree with plan as outlined above.

## 2023-10-01 NOTE — CHART NOTE - NSCHARTNOTEFT_GEN_A_CORE
MICU Transfer Note    Transfer from: MICU    Transfer to: (  ) Medicine    (  ) Telemetry     (  x ) RCU        (    ) Palliative         (   ) Stroke Unit       (  ) MICU     Accepting Physician: Dr Canela  Signout given to: Rudi    HPI: 74 yo F (wheelchair bound prior to admission) w/ HFpEF, T2DM, CKD5, latent TB (s/p tx,) hx breast ca (2018, s/p mastectomy and adjuvant CT/RT), and hx prior CVA s/p trach/PEG (decannulated prior to admission), ILR in place, who was initially admitted on 8/11/23 after p/w with acute hypoxemic and hypercapnic respiratory failure thought to be 2/2 Aspiration PNA vs ADHF/flash pulmonary edema in setting of HTN emergency. Patient required intubation and mechanical ventilation/intubation, and was admitted to MICU for lengthy period of time.     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     Hospital course also marked by recurrent infections, most recently with MSSA bacteremia (s/p Vanc >> developed generalized rash and was switched to Daptomycin through 10/2 ( also developed possible cefepime vs. cefazolin drug-induced rash) and MSSA/ESBL EColi in BAL. Also found to have L otitis externa (9/5) s/p ENT debridement x2 (last 9/21), s/p wick (removed) c/b concern for superimposed candida infection in the ear (s/p Meropenem, s/p Fluconazole since 9/21, which was discontinued due to possible generalized rash).     __ pt was started on Bumex gtt on 9/25 ( since decreased output on Bumex IVP)     9/26-- pt was transferred to MICU for hypotension in s/o possible worsening sepsis and for pressors assisted diuresis. Bumex gtt was held. Pt has been of vasopressors since 9/27. Persistent poor mental state, pt only opens eyes spontaneously and not following commands. Repeat CTH 9/26-- with no acute changes, Neurology ( Dr Katty Charles) following pt. As per discussion with Dr Irizarry, Ok to transition off heparin gtt and start on Eliquis (transitioned 9/30). TTE is ordered to R/O endocarditis ( prior TTE is inconclusive for endocarditis). Daptomycin through 10/2. Ophthalmology following for possible L eye endophthalmitis vs HSV keratitis ( Valtrex 9/29 - ). Pt spiked temp on 9/29 ( no fevers since 9/20_ and was started empirically on Meropenem as per ID recs ( 9/29 - ).  Also further found to have L popliteal DVT (initiated on heparin gtt).    Patient transferred back to MICU for worsening hypotension as well as rising creatinine/BUN in setting of ARF on CKD.      MICU COURSE:      PAST MEDICAL & SURGICAL HISTORY:  Breast CA      Diabetes      Stroke      Cardiac arrest      HTN (hypertension)      H/O mastectomy, bilateral          Vital Signs Last 24 Hrs  T(C): 37.1 (01 Oct 2023 08:00), Max: 37.5 (01 Oct 2023 04:00)  T(F): 98.8 (01 Oct 2023 08:00), Max: 99.5 (01 Oct 2023 04:00)  HR: 110 (01 Oct 2023 08:00) (84 - 118)  BP: 131/38 (01 Oct 2023 08:00) (108/34 - 131/38)  BP(mean): 51 (01 Oct 2023 08:00) (51 - 86)  RR: 20 (01 Oct 2023 08:00) (16 - 22)  SpO2: 97% (01 Oct 2023 08:00) (96% - 100%)    Parameters below as of 01 Oct 2023 08:00  Patient On (Oxygen Delivery Method): ventilator    O2 Concentration (%): 40  I&O's Summary    30 Sep 2023 07:01  -  01 Oct 2023 07:00  --------------------------------------------------------  IN: 1810 mL / OUT: 2400 mL / NET: -590 mL    01 Oct 2023 07:01  -  01 Oct 2023 08:32  --------------------------------------------------------  IN: 70 mL / OUT: 0 mL / NET: 70 mL      Allergies    isoniazid (Rash)  nafcillin (Unknown)  hydrALAZINE (Rash)  vitamin E (Short breath; Urticaria; Hives)  doxycycline (Rash)  cefepime (Rash)  NIFEdipine (Urticaria; Hives)    Intolerances      MEDICATIONS  (STANDING):  acetaminophen   IVPB .. 1000 milliGRAM(s) IV Intermittent once  acetic acid 0.25% Topical Irrigation 1 Application(s) Topical two times a day  albuterol/ipratropium for Nebulization 3 milliLiter(s) Nebulizer every 6 hours  apixaban 10 milliGRAM(s) Oral every 12 hours  artificial tears (preservative free) Ophthalmic Solution 1 Drop(s) Right EYE every 2 hours  aspirin  chewable 81 milliGRAM(s) Enteral Tube daily  atorvastatin 10 milliGRAM(s) Oral at bedtime  chlorhexidine 0.12% Liquid 15 milliLiter(s) Oral Mucosa every 12 hours  chlorhexidine 2% Cloths 1 Application(s) Topical daily  DAPTOmycin IVPB 500 milliGRAM(s) IV Intermittent every 48 hours  dextrose 5%. 1000 milliLiter(s) (100 mL/Hr) IV Continuous <Continuous>  dextrose 5%. 1000 milliLiter(s) (50 mL/Hr) IV Continuous <Continuous>  dextrose 50% Injectable 12.5 Gram(s) IV Push once  dextrose 50% Injectable 25 Gram(s) IV Push once  dextrose 50% Injectable 25 Gram(s) IV Push once  epoetin odalis (EPOGEN) Injectable 84304 Unit(s) SubCutaneous <User Schedule>  erythromycin   Ointment 1 Application(s) Left EYE four times a day  ferrous    sulfate Liquid 300 milliGRAM(s) Oral daily  glucagon  Injectable 1 milliGRAM(s) IntraMuscular once  hydrocortisone sodium succinate Injectable 25 milliGRAM(s) IV Push every 8 hours  insulin lispro (ADMELOG) corrective regimen sliding scale   SubCutaneous every 6 hours  insulin NPH human recombinant 11 Unit(s) SubCutaneous every 6 hours  meropenem  IVPB 500 milliGRAM(s) IV Intermittent every 24 hours  midodrine. 30 milliGRAM(s) Oral every 8 hours  ofloxacin 0.3% Solution 1 Drop(s) Left EYE every 2 hours  pantoprazole  Injectable 40 milliGRAM(s) IV Push two times a day  petrolatum Ophthalmic Ointment 1 Application(s) Right EYE two times a day  polyethylene glycol 3350 17 Gram(s) Oral two times a day  psyllium Powder 1 Packet(s) Oral daily  senna Syrup 10 milliLiter(s) Oral daily  sodium chloride 2 Gram(s) Oral two times a day  valACYclovir 500 milliGRAM(s) Oral every 24 hours    MEDICATIONS  (PRN):  acetaminophen   Oral Liquid .. 650 milliGRAM(s) Oral every 6 hours PRN Temp greater or equal to 38C (100.4F), Mild Pain (1 - 3)  artificial tears (preservative free) Ophthalmic Solution 1 Drop(s) Both EYES three times a day PRN Dry Eyes  calamine/zinc oxide Lotion 1 Application(s) Topical two times a day PRN Rash and/or Itching  dextrose Oral Gel 15 Gram(s) Oral once PRN Blood Glucose LESS THAN 70 milliGRAM(s)/deciliter  sodium chloride 0.9% lock flush 10 milliLiter(s) IV Push every 1 hour PRN Pre/post blood products, medications, blood draw, and to maintain line patency      Adult Advanced Hemodynamics Last 24 Hrs  CVP(mm Hg): --  CVP(cm H2O): --  CO: --  CI: --  PA: --  PA(mean): --  PCWP: --  SVR: --  SVRI: --  PVR: --  PVRI: --                              7.1    8.76  )-----------( 348      ( 01 Oct 2023 00:35 )             22.0     10-01    136  |  97<L>  |  45<H>  ----------------------------<  126<H>  3.7   |  27  |  1.47<H>    Ca    8.6      01 Oct 2023 00:35  Phos  1.8     10-01  Mg     2.10     10-01    TPro  5.6<L>  /  Alb  2.3<L>  /  TBili  0.3  /  DBili  x   /  AST  53<H>  /  ALT  19  /  AlkPhos  307<H>  10-01    PTT - ( 01 Oct 2023 00:35 )  PTT:28.9 sec  PHYSICAL EXAM:      Constitutional:    Eyes:    ENMT:    Neck:    Breasts:    Back:    Respiratory:    Cardiovascular:    Gastrointestinal:    Genitourinary:    Rectal:    Extremities:    Vascular:    Neurological:    Skin:    Lymph Nodes:    Musculoskeletal:    Psychiatric:        Lactate:    Ekg:  Telemetry:    ASSESSMENT & PLAN:           FOR FOLLOW UP:    Kamila Hoang PA-C MICU Transfer Note    Transfer from: MICU    Transfer to: (  ) Medicine    (  ) Telemetry     (  x ) RCU        (    ) Palliative         (   ) Stroke Unit       (  ) MICU     Accepting Physician: Dr Canela  Signout given to: Rudi    HPI: 72 yo F (wheelchair bound prior to admission) w/ HFpEF, T2DM, CKD5, latent TB (s/p tx,) hx breast ca (2018, s/p mastectomy and adjuvant CT/RT), and hx prior CVA s/p trach/PEG (decannulated prior to admission), ILR in place, who was initially admitted on 8/11/23 after p/w with acute hypoxemic and hypercapnic respiratory failure thought to be 2/2 Aspiration PNA vs ADHF/flash pulmonary edema in setting of HTN emergency. Patient required intubation and mechanical ventilation/intubation, and was admitted to MICU for lengthy period of time.     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     Hospital course also marked by recurrent infections, most recently with MSSA bacteremia (s/p Vanc >> developed generalized rash and was switched to Daptomycin through 10/2 ( also developed possible cefepime vs. cefazolin drug-induced rash) and MSSA/ESBL EColi in BAL. Also found to have L otitis externa (9/5) s/p ENT debridement x2 (last 9/21), s/p wick (removed) c/b concern for superimposed candida infection in the ear (s/p Meropenem, s/p Fluconazole since 9/21, which was discontinued due to possible generalized rash).     __ pt was started on Bumex gtt on 9/25 ( since decreased output on Bumex IVP)     MICU COURSE: 9/26-- pt was transferred to MICU for hypotension in s/o possible worsening sepsis, worsening renal failure, and for pressors assisted diuresis. Bumex gtt was held. Pt has been of vasopressors since 9/27. Persistent poor mental state, pt only opens eyes spontaneously and not following commands. Repeat CTH 9/26-- with no acute changes, Neurology ( Dr Katty Charles) following pt. TTE is ordered to R/O endocarditis ( prior TTE is inconclusive for endocarditis). Pt has been failing PS trials-- low TV, desynchronous, tachypnic; minimal secretions. Pt has been tolerating Tube feeds and moving BM's. S/p EGD 8/31 which showed esophagitis/erosive gastropathy s/p multiple blood transfusions, last transfusion on 9/19, ASA restarted on 9/29. R IJ shiley was placed on 9/27 and pt was dialyzed on 9/27, 9/28, and 9/30, last HD on 9/30 with 2 L net removal and pt tolerated without pressors requirements. Pt with minimal UOP, willard was removed on 9/26, no UOP post removal, last bladder scan at 00:00 on 10/1 with 67 cc on the bladder scan. Daptomycin through 10/2. Ophthalmology following for possible L eye endophthalmitis vs HSV keratitis ( Valtrex 9/29 - ). Pt spiked temp on 9/29 ( no fevers since 9/20_ and was started empirically on Meropenem as per ID recs ( 9/29 - ).  Also further found to have acute L popliteal DVT (initiated on heparin gtt). As per discussion with Dr Katty Charles (neurology), Ok to transition off heparin gtt and start Eliquis (transitioned 9/30). Pt remains full code. Pt's spouse and daughter are at the bedside and have been updated about pt's status.       PAST MEDICAL & SURGICAL HISTORY:  Breast CA      Diabetes      Stroke      Cardiac arrest      HTN (hypertension)      H/O mastectomy, bilateral          Vital Signs Last 24 Hrs  T(C): 37.1 (01 Oct 2023 08:00), Max: 37.5 (01 Oct 2023 04:00)  T(F): 98.8 (01 Oct 2023 08:00), Max: 99.5 (01 Oct 2023 04:00)  HR: 110 (01 Oct 2023 08:00) (84 - 118)  BP: 131/38 (01 Oct 2023 08:00) (108/34 - 131/38)  BP(mean): 51 (01 Oct 2023 08:00) (51 - 86)  RR: 20 (01 Oct 2023 08:00) (16 - 22)  SpO2: 97% (01 Oct 2023 08:00) (96% - 100%)    Parameters below as of 01 Oct 2023 08:00  Patient On (Oxygen Delivery Method): ventilator    O2 Concentration (%): 40  I&O's Summary    30 Sep 2023 07:01  -  01 Oct 2023 07:00  --------------------------------------------------------  IN: 1810 mL / OUT: 2400 mL / NET: -590 mL    01 Oct 2023 07:01  -  01 Oct 2023 08:32  --------------------------------------------------------  IN: 70 mL / OUT: 0 mL / NET: 70 mL      Allergies    isoniazid (Rash)  nafcillin (Unknown)  hydrALAZINE (Rash)  vitamin E (Short breath; Urticaria; Hives)  doxycycline (Rash)  cefepime (Rash)  NIFEdipine (Urticaria; Hives)    Intolerances      MEDICATIONS  (STANDING):  acetaminophen   IVPB .. 1000 milliGRAM(s) IV Intermittent once  acetic acid 0.25% Topical Irrigation 1 Application(s) Topical two times a day  albuterol/ipratropium for Nebulization 3 milliLiter(s) Nebulizer every 6 hours  apixaban 10 milliGRAM(s) Oral every 12 hours  artificial tears (preservative free) Ophthalmic Solution 1 Drop(s) Right EYE every 2 hours  aspirin  chewable 81 milliGRAM(s) Enteral Tube daily  atorvastatin 10 milliGRAM(s) Oral at bedtime  chlorhexidine 0.12% Liquid 15 milliLiter(s) Oral Mucosa every 12 hours  chlorhexidine 2% Cloths 1 Application(s) Topical daily  DAPTOmycin IVPB 500 milliGRAM(s) IV Intermittent every 48 hours  dextrose 5%. 1000 milliLiter(s) (100 mL/Hr) IV Continuous <Continuous>  dextrose 5%. 1000 milliLiter(s) (50 mL/Hr) IV Continuous <Continuous>  dextrose 50% Injectable 12.5 Gram(s) IV Push once  dextrose 50% Injectable 25 Gram(s) IV Push once  dextrose 50% Injectable 25 Gram(s) IV Push once  epoetin odalis (EPOGEN) Injectable 43026 Unit(s) SubCutaneous <User Schedule>  erythromycin   Ointment 1 Application(s) Left EYE four times a day  ferrous    sulfate Liquid 300 milliGRAM(s) Oral daily  glucagon  Injectable 1 milliGRAM(s) IntraMuscular once  hydrocortisone sodium succinate Injectable 25 milliGRAM(s) IV Push every 8 hours  insulin lispro (ADMELOG) corrective regimen sliding scale   SubCutaneous every 6 hours  insulin NPH human recombinant 11 Unit(s) SubCutaneous every 6 hours  meropenem  IVPB 500 milliGRAM(s) IV Intermittent every 24 hours  midodrine. 30 milliGRAM(s) Oral every 8 hours  ofloxacin 0.3% Solution 1 Drop(s) Left EYE every 2 hours  pantoprazole  Injectable 40 milliGRAM(s) IV Push two times a day  petrolatum Ophthalmic Ointment 1 Application(s) Right EYE two times a day  polyethylene glycol 3350 17 Gram(s) Oral two times a day  psyllium Powder 1 Packet(s) Oral daily  senna Syrup 10 milliLiter(s) Oral daily  sodium chloride 2 Gram(s) Oral two times a day  valACYclovir 500 milliGRAM(s) Oral every 24 hours    MEDICATIONS  (PRN):  acetaminophen   Oral Liquid .. 650 milliGRAM(s) Oral every 6 hours PRN Temp greater or equal to 38C (100.4F), Mild Pain (1 - 3)  artificial tears (preservative free) Ophthalmic Solution 1 Drop(s) Both EYES three times a day PRN Dry Eyes  calamine/zinc oxide Lotion 1 Application(s) Topical two times a day PRN Rash and/or Itching  dextrose Oral Gel 15 Gram(s) Oral once PRN Blood Glucose LESS THAN 70 milliGRAM(s)/deciliter  sodium chloride 0.9% lock flush 10 milliLiter(s) IV Push every 1 hour PRN Pre/post blood products, medications, blood draw, and to maintain line patency                         7.1    8.76  )-----------( 348      ( 01 Oct 2023 00:35 )             22.0     10-01    136  |  97<L>  |  45<H>  ----------------------------<  126<H>  3.7   |  27  |  1.47<H>    Ca    8.6      01 Oct 2023 00:35  Phos  1.8     10-01  Mg     2.10     10-01    TPro  5.6<L>  /  Alb  2.3<L>  /  TBili  0.3  /  DBili  x   /  AST  53<H>  /  ALT  19  /  AlkPhos  307<H>  10-01    PTT - ( 01 Oct 2023 00:35 )  PTT:28.9 sec    Lactate: 0.9    Ekg: NSR     Telemetry: NSR    FOR FOLLOW UP:    [] monitor for improvement in MS  [] wean off Midodrine as able  [] stress steroids since 9/27, tapered to 25 TID on 9/29__ continue tapering   [] PS as tolerated   [] PEG when feasible   [] ANDREIA Rosado, last HD on 9/30, UOP minimal, indwelling willard was removed, last bladder scan at 00:00 on 10/1 -- 67 cc;__ f/u with renal recs  [] Heparin transitioned to Eliquis on 9/30, f/u aPTT   [] Daptomycin until 10/2, and cont Merrem (9/29 -), and Valtrex (9/29 -), ID following,     Kamila Hoang PA-C

## 2023-10-01 NOTE — CHART NOTE - NSCHARTNOTEFT_GEN_A_CORE
[facility] MEDICINE COVERAGE.    Notified by RN, pt desatted to 85-87% while on AC/VC. Staff lavaged and suctioned via nasopharyngeal and noted moderate thick secretions. Suction PRN/Hypertonic Saline ordered to assist with airway clearance. Pts FiO2 also increased from 30 to 40%, and has been maintaining % since. Pt also noted to have mild low grade fever of 100.5F axillary at 22:37. Per chart review, pt had bronchoscopy while in MICU noting secretions in Right Lung Field. Also noted to have, 101F fever 9/29, sepsis work up sent then with BCx NGTD, SCx + for Pseudomonas/Proteus, Meropenem added that day. Pt also to finish Daptomycin tomorrow, 10/2. Will continue to monitor overnight.     Vital Signs Last 24 Hrs  T(C): 37.7 (02 Oct 2023 00:16), Max: 38.1 (01 Oct 2023 22:37)  T(F): 99.9 (02 Oct 2023 00:16), Max: 100.5 (01 Oct 2023 22:37)  HR: 113 (02 Oct 2023 00:16) (85 - 117)  BP: 114/49 (02 Oct 2023 00:16) (104/62 - 131/38)  BP(mean): 51 (01 Oct 2023 08:00) (51 - 54)  RR: 20 (02 Oct 2023 00:16) (20 - 22)  SpO2: 99% (02 Oct 2023 00:16) (94% - 100%)    Parameters below as of 02 Oct 2023 00:16  Patient On (Oxygen Delivery Method): ventilator    Matilda Tripathi PA  Medicine [id number]

## 2023-10-02 NOTE — PROGRESS NOTE ADULT - SUBJECTIVE AND OBJECTIVE BOX
Follow Up:  MSSA bacteremia    Interval History: rash resolved but pt had fever again and sputum cx with pseudomonas which is carbapenem resistant    ROS:    Unobtainable because: trached    Allergies  isoniazid (Rash)  nafcillin (Unknown)  hydrALAZINE (Rash)  vitamin E (Short breath; Urticaria; Hives)  doxycycline (Rash)  cefepime (Rash)  NIFEdipine (Urticaria; Hives)        ANTIMICROBIALS:  valACYclovir 500 every 24 hours      OTHER MEDS:  acetaminophen   Oral Liquid .. 650 milliGRAM(s) Oral every 6 hours PRN  acetic acid 0.25% Topical Irrigation 1 Application(s) Topical two times a day  albuterol/ipratropium for Nebulization 3 milliLiter(s) Nebulizer every 6 hours  apixaban 10 milliGRAM(s) Oral every 12 hours  artificial tears (preservative free) Ophthalmic Solution 1 Drop(s) Both EYES three times a day PRN  artificial tears (preservative free) Ophthalmic Solution 1 Drop(s) Right EYE every 4 hours  aspirin  chewable 81 milliGRAM(s) Enteral Tube daily  atorvastatin 10 milliGRAM(s) Oral at bedtime  calamine/zinc oxide Lotion 1 Application(s) Topical two times a day PRN  chlorhexidine 0.12% Liquid 15 milliLiter(s) Oral Mucosa every 12 hours  chlorhexidine 2% Cloths 1 Application(s) Topical daily  dextrose 5%. 1000 milliLiter(s) IV Continuous <Continuous>  dextrose 5%. 1000 milliLiter(s) IV Continuous <Continuous>  dextrose 50% Injectable 25 Gram(s) IV Push once  dextrose 50% Injectable 25 Gram(s) IV Push once  dextrose 50% Injectable 12.5 Gram(s) IV Push once  dextrose Oral Gel 15 Gram(s) Oral once PRN  epoetin odalis (EPOGEN) Injectable 87848 Unit(s) IV Push <User Schedule>  erythromycin   Ointment 1 Application(s) Left EYE four times a day  ferrous    sulfate Liquid 300 milliGRAM(s) Oral daily  glucagon  Injectable 1 milliGRAM(s) IntraMuscular once  hydrocortisone sodium succinate Injectable 25 milliGRAM(s) IV Push daily  insulin lispro (ADMELOG) corrective regimen sliding scale   SubCutaneous every 6 hours  insulin NPH human recombinant 11 Unit(s) SubCutaneous every 6 hours  midodrine. 30 milliGRAM(s) Oral every 8 hours  ofloxacin 0.3% Solution 1 Drop(s) Left EYE every 4 hours  pantoprazole  Injectable 40 milliGRAM(s) IV Push two times a day  petrolatum Ophthalmic Ointment 1 Application(s) Right EYE two times a day  sodium chloride 2 Gram(s) Oral two times a day  sodium chloride 0.9% lock flush 10 milliLiter(s) IV Push every 1 hour PRN  sodium chloride 3%  Inhalation 4 milliLiter(s) Inhalation every 6 hours      Vital Signs Last 24 Hrs  T(C): 37.1 (02 Oct 2023 12:55), Max: 38.1 (01 Oct 2023 22:37)  T(F): 98.8 (02 Oct 2023 12:55), Max: 100.5 (01 Oct 2023 22:37)  HR: 99 (02 Oct 2023 15:54) (55 - 116)  BP: 114/63 (02 Oct 2023 12:55) (113/48 - 129/42)  BP(mean): --  RR: 22 (02 Oct 2023 12:55) (18 - 22)  SpO2: 100% (02 Oct 2023 15:54) (96% - 100%)    Parameters below as of 02 Oct 2023 15:54  Patient On (Oxygen Delivery Method): ventilator        Physical Exam:  General:    trached   ENT: L ear with crusted dark drainage  Respiratory:   trach on vent  abd:  distended but soft  :     willard  Musculoskeletal : generalized edema  Skin:  resolved rash  vascular: TRISHA odom                                7.0    9.40  )-----------( 360      ( 02 Oct 2023 05:30 )             21.7       10-02    133<L>  |  98  |  56<H>  ----------------------------<  173<H>  3.7   |  27  |  1.94<H>    Ca    8.6      02 Oct 2023 05:30  Phos  2.3     10-02  Mg     2.00     10-02    TPro  5.6<L>  /  Alb  2.3<L>  /  TBili  0.3  /  DBili  x   /  AST  53<H>  /  ALT  19  /  AlkPhos  307<H>  10-01      Urinalysis Basic - ( 02 Oct 2023 05:30 )    Color: x / Appearance: x / SG: x / pH: x  Gluc: 173 mg/dL / Ketone: x  / Bili: x / Urobili: x   Blood: x / Protein: x / Nitrite: x   Leuk Esterase: x / RBC: x / WBC x   Sq Epi: x / Non Sq Epi: x / Bacteria: x        MICROBIOLOGY:  v  .Blood Blood-Venous  09-29-23   No growth at 48 Hours  --  --      .Blood Blood-Peripheral  09-29-23   No growth at 48 Hours  --  --      ET Tube ET Tube  09-29-23   Testing in progress  --  --      ET Tube ET Tube  09-29-23   Numerous Proteus mirabilis  Moderate Pseudomonas aeruginosa (Carbapenem Resistant)  Normal Respiratory Cate absent  --  Proteus mirabilis  Pseudomonas aeruginosa (Carbapenem Resistant)      Clean Catch Clean Catch (Midstream)  09-26-23   10,000 - 49,000 CFU/mL Candida albicans "Susceptibilities not performed"  --  --      .Blood Blood-Peripheral  09-26-23   No growth at 5 days  --  --      .Blood Blood-Venous  09-20-23   No growth at 5 days  --  --      .Blood Blood-Peripheral  09-20-23   No growth at 5 days  --  --      Ear Ear  09-13-23   Moderate Candida albicans "Susceptibilities not performed"  --  --      .Blood Blood-Venous  09-10-23   No growth at 5 days  --  --      .Sputum Sputum  09-10-23   Normal Respiratory Cate present  --    Rare polymorphonuclear leukocytes per low power field  No Squamous epithelial cells per low power field  Rare Yeast like cells seen per oil power field  Rare Gram Positive Cocci in Clusters seen per oil power field      .Blood Blood-Peripheral  09-10-23   No growth at 5 days  --  --      .Sputum Sputum  09-08-23   Normal Respiratory Cate present  --    Numerous polymorphonuclear leukocytes per low power field  Numerous Squamous epithelial cells per low power field  Few Yeast like cells per oil power field  Results consistent with oropharyngeal contamination      .Blood Blood-Peripheral  09-08-23   No growth at 5 days  --  --      Ear Ear  09-06-23   No growth at 5 days  --  --      .Blood Blood-Peripheral  09-04-23   No growth at 5 days  --  --          Rapid RVP Result: NotDetec (09-26 @ 16:30)        RADIOLOGY:  Images independently visualized and reviewed personally, findings as below  < from: Xray Chest 1 View- PORTABLE-Urgent (Xray Chest 1 View- PORTABLE-Urgent .) (09.28.23 @ 01:16) >  IMPRESSION:  Bilateral layering effusions, right greater than left, that appears   similar as compared with 9/20/2023.    Perihilar interstitial opacities, likely pulmonary edema.      < end of copied text >  < from: CT Abdomen and Pelvis No Cont (09.26.23 @ 21:02) >    IMPRESSION:  Right upper lobe consolidation, unchanged. Left upper lobe consolidation   and tree-in-bud opacities and bilateral groundglass opacities, decreased.   Persistent interlobular septal thickening.    Bilateral pleural effusions with adjacent compressive atelectasis,   unchanged.    Small volume ascites.    Anasarca.    < end of copied text >

## 2023-10-02 NOTE — PROGRESS NOTE ADULT - SUBJECTIVE AND OBJECTIVE BOX
CHIEF COMPLAINT: Patient is a 73y old  Female who presents with a chief complaint of Respiratory distress (02 Oct 2023 08:16)      INTERVAL EVENTS:    REVIEW OF SYSTEMS: Seen by bedside during AM rounds and     Mode: AC/ CMV (Assist Control/ Continuous Mandatory Ventilation), RR (machine): 22, TV (machine): 340, FiO2: 40, PEEP: 8, ITime: 0.81, MAP: 19, PIP: 49      OBJECTIVE:  ICU Vital Signs Last 24 Hrs  T(C): 37.4 (02 Oct 2023 04:09), Max: 38.1 (01 Oct 2023 22:37)  T(F): 99.4 (02 Oct 2023 04:09), Max: 100.5 (01 Oct 2023 22:37)  HR: 100 (02 Oct 2023 08:05) (100 - 117)  BP: 120/34 (02 Oct 2023 04:09) (104/62 - 129/42)  BP(mean): --  ABP: --  ABP(mean): --  RR: 20 (02 Oct 2023 04:09) (20 - 22)  SpO2: 99% (02 Oct 2023 08:05) (94% - 99%)    O2 Parameters below as of 02 Oct 2023 04:09  Patient On (Oxygen Delivery Method): ventilator          Mode: AC/ CMV (Assist Control/ Continuous Mandatory Ventilation), RR (machine): 22, TV (machine): 340, FiO2: 40, PEEP: 8, ITime: 0.81, MAP: 19, PIP: 49    10-01 @ 07:01  -  10-02 @ 07:00  --------------------------------------------------------  IN: 950 mL / OUT: 0 mL / NET: 950 mL      CAPILLARY BLOOD GLUCOSE      POCT Blood Glucose.: 175 mg/dL (02 Oct 2023 05:08)      HOSPITAL MEDICATIONS:  MEDICATIONS  (STANDING):  acetic acid 0.25% Topical Irrigation 1 Application(s) Topical two times a day  albuterol/ipratropium for Nebulization 3 milliLiter(s) Nebulizer every 6 hours  apixaban 10 milliGRAM(s) Oral every 12 hours  artificial tears (preservative free) Ophthalmic Solution 1 Drop(s) Right EYE every 4 hours  aspirin  chewable 81 milliGRAM(s) Enteral Tube daily  atorvastatin 10 milliGRAM(s) Oral at bedtime  chlorhexidine 0.12% Liquid 15 milliLiter(s) Oral Mucosa every 12 hours  chlorhexidine 2% Cloths 1 Application(s) Topical daily  DAPTOmycin IVPB 500 milliGRAM(s) IV Intermittent every 48 hours  dextrose 5%. 1000 milliLiter(s) (50 mL/Hr) IV Continuous <Continuous>  dextrose 5%. 1000 milliLiter(s) (100 mL/Hr) IV Continuous <Continuous>  dextrose 50% Injectable 12.5 Gram(s) IV Push once  dextrose 50% Injectable 25 Gram(s) IV Push once  dextrose 50% Injectable 25 Gram(s) IV Push once  epoetin odalis (EPOGEN) Injectable 57063 Unit(s) IV Push <User Schedule>  erythromycin   Ointment 1 Application(s) Left EYE four times a day  ferrous    sulfate Liquid 300 milliGRAM(s) Oral daily  glucagon  Injectable 1 milliGRAM(s) IntraMuscular once  hydrocortisone sodium succinate Injectable 25 milliGRAM(s) IV Push daily  insulin lispro (ADMELOG) corrective regimen sliding scale   SubCutaneous every 6 hours  insulin NPH human recombinant 11 Unit(s) SubCutaneous every 6 hours  meropenem  IVPB 500 milliGRAM(s) IV Intermittent every 24 hours  midodrine. 30 milliGRAM(s) Oral every 8 hours  ofloxacin 0.3% Solution 1 Drop(s) Left EYE every 4 hours  pantoprazole  Injectable 40 milliGRAM(s) IV Push two times a day  petrolatum Ophthalmic Ointment 1 Application(s) Right EYE two times a day  polyethylene glycol 3350 17 Gram(s) Oral two times a day  psyllium Powder 1 Packet(s) Oral daily  senna Syrup 10 milliLiter(s) Oral daily  sodium chloride 2 Gram(s) Oral two times a day  sodium chloride 3%  Inhalation 4 milliLiter(s) Inhalation every 6 hours  valACYclovir 500 milliGRAM(s) Oral every 24 hours    MEDICATIONS  (PRN):  acetaminophen   Oral Liquid .. 650 milliGRAM(s) Oral every 6 hours PRN Temp greater or equal to 38C (100.4F), Mild Pain (1 - 3)  artificial tears (preservative free) Ophthalmic Solution 1 Drop(s) Both EYES three times a day PRN Dry Eyes  calamine/zinc oxide Lotion 1 Application(s) Topical two times a day PRN Rash and/or Itching  dextrose Oral Gel 15 Gram(s) Oral once PRN Blood Glucose LESS THAN 70 milliGRAM(s)/deciliter  sodium chloride 0.9% lock flush 10 milliLiter(s) IV Push every 1 hour PRN Pre/post blood products, medications, blood draw, and to maintain line patency      PHYSICAL EXAMINATION    LABS:                        7.0    9.40  )-----------( 360      ( 02 Oct 2023 05:30 )             21.7     10-02    133<L>  |  98  |  56<H>  ----------------------------<  173<H>  3.7   |  27  |  1.94<H>    Ca    8.6      02 Oct 2023 05:30  Phos  2.3     10-02  Mg     2.00     10-02    TPro  5.6<L>  /  Alb  2.3<L>  /  TBili  0.3  /  DBili  x   /  AST  53<H>  /  ALT  19  /  AlkPhos  307<H>  10-01    PTT - ( 01 Oct 2023 00:35 )  PTT:28.9 sec  Urinalysis Basic - ( 02 Oct 2023 05:30 )    Color: x / Appearance: x / SG: x / pH: x  Gluc: 173 mg/dL / Ketone: x  / Bili: x / Urobili: x   Blood: x / Protein: x / Nitrite: x   Leuk Esterase: x / RBC: x / WBC x   Sq Epi: x / Non Sq Epi: x / Bacteria: x        Venous Blood Gas:  10-01 @ 00:35  7.40/50/46/31/88.7  VBG Lactate: 0.9      PAST MEDICAL & SURGICAL HISTORY:  Breast CA      Diabetes      Stroke      Cardiac arrest      HTN (hypertension)      H/O mastectomy, bilateral          FAMILY HISTORY:  No pertinent family history in first degree relatives        Social History:      RADIOLOGY:  [ ] Reviewed and interpreted by me    PULMONARY FUNCTION TESTS:    EKG: CHIEF COMPLAINT: Patient is a 73y old  Female who presents with a chief complaint of Respiratory distress (02 Oct 2023 08:16)      INTERVAL EVENTS:  - No interval events overnight   - HH dropped this morning and 1/2 PRBC transfused   -  PSA in sputum resistant to ana cristina and changed to zosyn     REVIEW OF SYSTEMS: Seen by bedside during AM rounds and unable to assess ROS as vented and altered.     Mode: AC/ CMV (Assist Control/ Continuous Mandatory Ventilation), RR (machine): 22, TV (machine): 340, FiO2: 40, PEEP: 8, ITime: 0.81, MAP: 19, PIP: 49      OBJECTIVE:  ICU Vital Signs Last 24 Hrs  T(C): 37.4 (02 Oct 2023 04:09), Max: 38.1 (01 Oct 2023 22:37)  T(F): 99.4 (02 Oct 2023 04:09), Max: 100.5 (01 Oct 2023 22:37)  HR: 100 (02 Oct 2023 08:05) (100 - 117)  BP: 120/34 (02 Oct 2023 04:09) (104/62 - 129/42)  BP(mean): --  ABP: --  ABP(mean): --  RR: 20 (02 Oct 2023 04:09) (20 - 22)  SpO2: 99% (02 Oct 2023 08:05) (94% - 99%)    O2 Parameters below as of 02 Oct 2023 04:09  Patient On (Oxygen Delivery Method): ventilator          Mode: AC/ CMV (Assist Control/ Continuous Mandatory Ventilation), RR (machine): 22, TV (machine): 340, FiO2: 40, PEEP: 8, ITime: 0.81, MAP: 19, PIP: 49    10-01 @ 07:01  -  10-02 @ 07:00  --------------------------------------------------------  IN: 950 mL / OUT: 0 mL / NET: 950 mL      CAPILLARY BLOOD GLUCOSE  POCT Blood Glucose.: 175 mg/dL (02 Oct 2023 05:08)      HOSPITAL MEDICATIONS:  MEDICATIONS  (STANDING):  acetic acid 0.25% Topical Irrigation 1 Application(s) Topical two times a day  albuterol/ipratropium for Nebulization 3 milliLiter(s) Nebulizer every 6 hours  apixaban 10 milliGRAM(s) Oral every 12 hours  artificial tears (preservative free) Ophthalmic Solution 1 Drop(s) Right EYE every 4 hours  aspirin  chewable 81 milliGRAM(s) Enteral Tube daily  atorvastatin 10 milliGRAM(s) Oral at bedtime  chlorhexidine 0.12% Liquid 15 milliLiter(s) Oral Mucosa every 12 hours  chlorhexidine 2% Cloths 1 Application(s) Topical daily  DAPTOmycin IVPB 500 milliGRAM(s) IV Intermittent every 48 hours  dextrose 5%. 1000 milliLiter(s) (50 mL/Hr) IV Continuous <Continuous>  dextrose 5%. 1000 milliLiter(s) (100 mL/Hr) IV Continuous <Continuous>  dextrose 50% Injectable 12.5 Gram(s) IV Push once  dextrose 50% Injectable 25 Gram(s) IV Push once  dextrose 50% Injectable 25 Gram(s) IV Push once  epoetin odails (EPOGEN) Injectable 16716 Unit(s) IV Push <User Schedule>  erythromycin   Ointment 1 Application(s) Left EYE four times a day  ferrous    sulfate Liquid 300 milliGRAM(s) Oral daily  glucagon  Injectable 1 milliGRAM(s) IntraMuscular once  hydrocortisone sodium succinate Injectable 25 milliGRAM(s) IV Push daily  insulin lispro (ADMELOG) corrective regimen sliding scale   SubCutaneous every 6 hours  insulin NPH human recombinant 11 Unit(s) SubCutaneous every 6 hours  meropenem  IVPB 500 milliGRAM(s) IV Intermittent every 24 hours  midodrine. 30 milliGRAM(s) Oral every 8 hours  ofloxacin 0.3% Solution 1 Drop(s) Left EYE every 4 hours  pantoprazole  Injectable 40 milliGRAM(s) IV Push two times a day  petrolatum Ophthalmic Ointment 1 Application(s) Right EYE two times a day  polyethylene glycol 3350 17 Gram(s) Oral two times a day  psyllium Powder 1 Packet(s) Oral daily  senna Syrup 10 milliLiter(s) Oral daily  sodium chloride 2 Gram(s) Oral two times a day  sodium chloride 3%  Inhalation 4 milliLiter(s) Inhalation every 6 hours  valACYclovir 500 milliGRAM(s) Oral every 24 hours    MEDICATIONS  (PRN):  acetaminophen   Oral Liquid .. 650 milliGRAM(s) Oral every 6 hours PRN Temp greater or equal to 38C (100.4F), Mild Pain (1 - 3)  artificial tears (preservative free) Ophthalmic Solution 1 Drop(s) Both EYES three times a day PRN Dry Eyes  calamine/zinc oxide Lotion 1 Application(s) Topical two times a day PRN Rash and/or Itching  dextrose Oral Gel 15 Gram(s) Oral once PRN Blood Glucose LESS THAN 70 milliGRAM(s)/deciliter  sodium chloride 0.9% lock flush 10 milliLiter(s) IV Push every 1 hour PRN Pre/post blood products, medications, blood draw, and to maintain line patency      PHYSICAL EXAMINATION  General: NAD   HEENT: Trach and L IJ SHILEY present   Cards: S1/S2, no murmurs   Pulm: Course vent sounds with bibasilar crackles. No wheezes.   Abdomen: Soft, NTND. BS (+)   Extremities: Generalized anasarca and BL LE 2+ pitting edema. No AROM x 4  Neurology: Eyes closed, only grimaces to pain, with no focal neurological deficits     LABS:                        7.0    9.40  )-----------( 360      ( 02 Oct 2023 05:30 )             21.7     10-02    133<L>  |  98  |  56<H>  ----------------------------<  173<H>  3.7   |  27  |  1.94<H>    Ca    8.6      02 Oct 2023 05:30  Phos  2.3     10-02  Mg     2.00     10-02    TPro  5.6<L>  /  Alb  2.3<L>  /  TBili  0.3  /  DBili  x   /  AST  53<H>  /  ALT  19  /  AlkPhos  307<H>  10-01    PTT - ( 01 Oct 2023 00:35 )  PTT:28.9 sec  Urinalysis Basic - ( 02 Oct 2023 05:30 )    Color: x / Appearance: x / SG: x / pH: x  Gluc: 173 mg/dL / Ketone: x  / Bili: x / Urobili: x   Blood: x / Protein: x / Nitrite: x   Leuk Esterase: x / RBC: x / WBC x   Sq Epi: x / Non Sq Epi: x / Bacteria: x        Venous Blood Gas:  10-01 @ 00:35  7.40/50/46/31/88.7  VBG Lactate: 0.9      PAST MEDICAL & SURGICAL HISTORY:  Breast CA      Diabetes      Stroke      Cardiac arrest      HTN (hypertension)      H/O mastectomy, bilateral          FAMILY HISTORY:  No pertinent family history in first degree relatives        Social History:      RADIOLOGY:  [ ] Reviewed and interpreted by me    PULMONARY FUNCTION TESTS:    EKG:

## 2023-10-02 NOTE — PROGRESS NOTE ADULT - NS ATTEND AMEND GEN_ALL_CORE FT
Plan for dialysis with fluid removal today. Transfused 1/2 unit prbc's earlier today with 2nd half unit to be transfused tomorrow. Erythropoietin to be given with dialysis today. She is currently afebrile and HD stable. Continue Daptomycin for MSSA bacteremia. Change Meropenem to Zosyn given  Pseudomonas and Proteus pneumonia, which are both sensitive to Zosyn. Avoid cephalosporins given reaction to cefazolin. Follow-up ID. Remains on Valacyclovir for keratitis. Continue midodrine 30 mg q8h. On apixaban for DVT. Overall prognosis guarded, remains full code. Discussed with  at bedside.

## 2023-10-02 NOTE — CHART NOTE - NSCHARTNOTEFT_GEN_A_CORE
[facility] [employer].    Notified by RN, AM labs w/ Hgb 7. Per chart review, Hgb noted to be downtrending since 9/27. Cardiology recommending transfuse for Hgb < 8. Pt recently switched from Heparin gtt to Eliquis BID (9/30-) for known DVT. No signs or symptoms of bleeding (ecchymosis, hematuria, melena, no bleeding noted on tracheal secretions). Will transfuse with 1/2uPRBC today (last transfusion 9/19), pending T&S to result. Pt planned for HD 10/3, if needed can consider full unit PRBC then as well. Will continue to monitor overnight.     Vital Signs Last 24 Hrs  T(C): 37.7 (02 Oct 2023 00:16), Max: 38.1 (01 Oct 2023 22:37)  T(F): 99.9 (02 Oct 2023 00:16), Max: 100.5 (01 Oct 2023 22:37)  HR: 113 (02 Oct 2023 00:16) (100 - 117)  BP: 114/49 (02 Oct 2023 00:16) (104/62 - 131/38)  BP(mean): 51 (01 Oct 2023 08:00) (51 - 51)  RR: 20 (02 Oct 2023 00:16) (20 - 22)  SpO2: 99% (02 Oct 2023 00:16) (94% - 100%)    Parameters below as of 02 Oct 2023 00:16  Patient On (Oxygen Delivery Method): ventilator                          7.0    9.40  )-----------( 360      ( 02 Oct 2023 05:30 )             21.7     [ ] 1/2uPRBC x1  [ ] f/u T&S (sent with AM Labs)  [ ] f/u post-transfusion CBC    Matilda Tripathi PA  Medicine [id number]

## 2023-10-02 NOTE — PROGRESS NOTE ADULT - SUBJECTIVE AND OBJECTIVE BOX
Patient is a 73y old  Female who presents with a chief complaint of Respiratory distress (02 Oct 2023 17:21)      SUBJECTIVE / OVERNIGHT EVENTS: trache to vent, completed a course of ABx for MSSA bacteremia TODAY, sputum grew P. aeruginosa resistant to MEROPENEM, ptn was switched to ZOSYN. so far no allergic rashes, on HD as per renal, transfuse w 1/2 PRBC today, EPO as per renal, on ELIQUIS for acute DVT, on VALTREX for possible opthalmic HSV    MEDICATIONS  (STANDING):  acetic acid 0.25% Topical Irrigation 1 Application(s) Topical two times a day  albuterol/ipratropium for Nebulization 3 milliLiter(s) Nebulizer every 6 hours  apixaban 10 milliGRAM(s) Oral every 12 hours  artificial tears (preservative free) Ophthalmic Solution 1 Drop(s) Both EYES every 4 hours  aspirin  chewable 81 milliGRAM(s) Enteral Tube daily  atorvastatin 10 milliGRAM(s) Oral at bedtime  chlorhexidine 0.12% Liquid 15 milliLiter(s) Oral Mucosa every 12 hours  chlorhexidine 2% Cloths 1 Application(s) Topical daily  dextrose 5%. 1000 milliLiter(s) (50 mL/Hr) IV Continuous <Continuous>  dextrose 5%. 1000 milliLiter(s) (100 mL/Hr) IV Continuous <Continuous>  dextrose 50% Injectable 12.5 Gram(s) IV Push once  dextrose 50% Injectable 25 Gram(s) IV Push once  dextrose 50% Injectable 25 Gram(s) IV Push once  epoetin odalis (EPOGEN) Injectable 68270 Unit(s) IV Push <User Schedule>  ferrous    sulfate Liquid 300 milliGRAM(s) Oral daily  glucagon  Injectable 1 milliGRAM(s) IntraMuscular once  hydrocortisone sodium succinate Injectable 25 milliGRAM(s) IV Push daily  insulin lispro (ADMELOG) corrective regimen sliding scale   SubCutaneous every 6 hours  insulin NPH human recombinant 11 Unit(s) SubCutaneous every 6 hours  midodrine. 30 milliGRAM(s) Oral every 8 hours  pantoprazole  Injectable 40 milliGRAM(s) IV Push two times a day  petrolatum Ophthalmic Ointment 1 Application(s) Both EYES two times a day  sodium chloride 2 Gram(s) Oral two times a day  sodium chloride 3%  Inhalation 4 milliLiter(s) Inhalation every 6 hours  valACYclovir 500 milliGRAM(s) Oral every 24 hours    MEDICATIONS  (PRN):  acetaminophen   Oral Liquid .. 650 milliGRAM(s) Oral every 6 hours PRN Temp greater or equal to 38C (100.4F), Mild Pain (1 - 3)  calamine/zinc oxide Lotion 1 Application(s) Topical two times a day PRN Rash and/or Itching  dextrose Oral Gel 15 Gram(s) Oral once PRN Blood Glucose LESS THAN 70 milliGRAM(s)/deciliter  sodium chloride 0.9% lock flush 10 milliLiter(s) IV Push every 1 hour PRN Pre/post blood products, medications, blood draw, and to maintain line patency      Vital Signs Last 24 Hrs  T(F): 97.8 (10-02-23 @ 18:00), Max: 100.5 (10-01-23 @ 22:37)  HR: 109 (10-02-23 @ 18:00) (55 - 116)  BP: 124/44 (10-02-23 @ 16:00) (113/48 - 124/44)  RR: 22 (10-02-23 @ 18:00) (18 - 22)  SpO2: 99% (10-02-23 @ 18:00) (97% - 100%)  Telemetry:   CAPILLARY BLOOD GLUCOSE      POCT Blood Glucose.: 143 mg/dL (02 Oct 2023 17:03)  POCT Blood Glucose.: 174 mg/dL (02 Oct 2023 11:29)  POCT Blood Glucose.: 175 mg/dL (02 Oct 2023 05:08)  POCT Blood Glucose.: 208 mg/dL (01 Oct 2023 23:43)    I&O's Summary    01 Oct 2023 07:01  -  02 Oct 2023 07:00  --------------------------------------------------------  IN: 950 mL / OUT: 0 mL / NET: 950 mL    02 Oct 2023 07:01  -  02 Oct 2023 19:04  --------------------------------------------------------  IN: 880 mL / OUT: 2400 mL / NET: -1520 mL        PHYSICAL EXAM:  GENERAL: NAD, well-developed  HEAD:  Atraumatic, Normocephalic  EYES: EOMI, PERRLA, conjunctiva and sclera clear  NECK: Supple, No JVD  CHEST/LUNG: Clear to auscultation bilaterally; No wheeze  HEART: Regular rate and rhythm; No murmurs, rubs, or gallops  ABDOMEN: Soft, Nontender, Nondistended; Bowel sounds present  EXTREMITIES:  2+ Peripheral Pulses, No clubbing, cyanosis, or edema  PSYCH: AAOx3  NEUROLOGY: non-focal  SKIN: No rashes or lesions    LABS:                        7.0    9.40  )-----------( 360      ( 02 Oct 2023 05:30 )             21.7     10-02    133<L>  |  98  |  56<H>  ----------------------------<  173<H>  3.7   |  27  |  1.94<H>    Ca    8.6      02 Oct 2023 05:30  Phos  2.3     10-02  Mg     2.00     10-02    TPro  5.6<L>  /  Alb  2.3<L>  /  TBili  0.3  /  DBili  x   /  AST  53<H>  /  ALT  19  /  AlkPhos  307<H>  10-01    PTT - ( 01 Oct 2023 00:35 )  PTT:28.9 sec      Urinalysis Basic - ( 02 Oct 2023 05:30 )    Color: x / Appearance: x / SG: x / pH: x  Gluc: 173 mg/dL / Ketone: x  / Bili: x / Urobili: x   Blood: x / Protein: x / Nitrite: x   Leuk Esterase: x / RBC: x / WBC x   Sq Epi: x / Non Sq Epi: x / Bacteria: x        RADIOLOGY & ADDITIONAL TESTS:    Imaging Personally Reviewed:    Consultant(s) Notes Reviewed:      Care Discussed with Consultants/Other Providers:

## 2023-10-02 NOTE — PROGRESS NOTE ADULT - ASSESSMENT
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 While in MICU patient noted with worsening renal failure requiring HD initiation, CGE/ Melena s/p EGD 8/31 found with esophagitis and erosive gastropathy, new right cerebellar infarct and fevers second to ESBL COLI and MSSA VAP, MSSA bacteremia, left ear otitis externa and drug rxn to cephalosporins Course further complicated by prolonged vent time s/p tracheostomy 9/1 and transferred to RCU 9/3 completed by recurrent fevers with high PIP, respiratory acidosis and concern for volume overloaded.   CTH repeated 9/26 for concern for encephalopathy - has known now - chronic bilateral cerebellar infarcts, L PCA infarct. L parietal hypodensity seen on prior CT likely artifactual    Impression:   Metabolic encephalopathy related to shock, infection, doubt new stroke  L PCA stroke, ESUS s/p ILR, also with bilateral centrum semiovale watershed infarcts   Multiple embolic strokes on MRI 8/17 - R cerebellum, midbrain, multiple other tiny embolic infarcts.   Dementia   MSSA bacteremia, r/o endocarditis - on Daptomycin  Acute popliteal DVT on Eliquis   Anemia     Recommendations   [] New CTH from 9/26 without any evidence of acute infarct -> can consider repeat CTH down the line in mental status does not improve with treatment of underlying metabolic derangements and infections  []On ABx for MSSA bacteremia, possble HSV keratitis  [] repeat TTE or consider STEPHANIE to rule out endocarditis   [] GI following for coffee ground emesis - esophagitis seen on colonoscopy, monitor for bleeding now that on heparin gtt  [] family declined kidney biopsy for AIN -> s/p steroid tx   [] C/w home ASA 81 mg if no contraindications   [] hep gtt transitioned to ELiquis -> ?consider holding and IVC filter given anemia   [] C/w home Atorvastatin 10 mg qhs   [] would interrogate ILR and review tele to see if pAF -. no AF seen  [] DVT/GI ppx - hep gtt   [] Neurochecks q4hr   [] BP goals: Normotension - on pressors  [] PT/OT when able   [] HgA1C goals < 7.0   [] continue to address GOC with family as pts  and daughter continue to remain hopeful    d/w     Katty Charles DO  Vascular Neurology  Office [phone]

## 2023-10-02 NOTE — PROGRESS NOTE ADULT - ASSESSMENT
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 family declined renal biopsy started on prednisone  s/p trach 9/1 and BAL with MSSA   ET cx with ESBL and MSSA  started on ana cristina 9/1  blood cx 9/1: MSSA   repeat negative 9/4, 9/8  CXR with b/l opacities, R increased  L ear edema and otitis externa, the last cx showed candida and ENT stated it could be fungal (saw black spores?)    initial  resp failure for ?fluid ovrer load but also had MSSA in the sputum, got vanco, aztreonam one day then cefepime and had rash, renal failure which was considered due to cefepime and then received 2 days of cefazolin and then off, now with trach and bronc on 9/1 with MSSA bacteremia and BAL also MSSA  another ET cx with MSSA and ESBL E-coli  hypotension, MSSA bacteremia likely due to pneumonia, repeat blood cx negative 9/4, TTE limited unable to exclude endocarditis  L otitis externa on ana cristina since 9/1 but worsening edema and black discoloration with bullae forming and persistent fever (ear cx was negative)  Ct showed acute on chronic otitis externa and otomastoiditis , superimposed cholesteatoma can not be excluded, no malignant otitis externa  pt again febrile, ENT stated there was black spores which could be a fungal infection and the last cx showed candida albicans s/p 7 days of fluconazole   new erythematous patchy rash, did not improved after discontinuing the ana cristina so vanco switched to dapto but still with rash and it also started after pt was started on fluconazole so not sure which caused it  also hypotensive now and ?uveitis vs endophthalmitis, head CT with acute/subacute ischemia and old occipital infarct, chest/abd CT with unchanged  RUL consolidation, decreased BEVERLY consolidation  pt uremic as well, s/p shiley and resumed on HD 9/27  s/p bronc with excessive dynamic airway collapse s/p trach change and some improvement  pt febrile and tachy on 9/29, sputum cx with carbapenem resistant pseudomonas (S to zosyn) and proteus    * ana cristina was switched to zosyn 10/1, for now no rash, will continue and monitor closely for allergic reactions  *  the 4 week course of MSSA bacteremia will end today, discontinue dapto after the today's dose  * monitor CBC/diff and renal function  * f/u with ophthalmology, they recommended valtrex as unclear eye pathology    The above assessment and plan was discussed with the primary team    Yumiko Conner MD  contact on teams  After 5pm and on weekends call [phone]

## 2023-10-02 NOTE — PROGRESS NOTE ADULT - SUBJECTIVE AND OBJECTIVE BOX
Overnight events noted      VITAL:  T(C): , Max: 38.1 (10-01-23 @ 22:37)  T(F): , Max: 100.5 (10-01-23 @ 22:37)  HR: 100 (10-02-23 @ 08:05)  BP: 120/34 (10-02-23 @ 04:09)  BP(mean): --  RR: 20 (10-02-23 @ 04:09)  SpO2: 99% (10-02-23 @ 08:05)  Wt(kg): --      PHYSICAL EXAM:  Constitutional: sedated  HEENT: trach-vent, (+)NGT  Respiratory: coarse BS b/l  Cardiovascular: tachy reg s1s2  Gastrointestinal: BS+, soft, NT/ND  Extremities: + peripheral edema  Neurological: reduced generalized strength   : (+) Wheeler  Skin: No rashes  Access: (+)[facility]    LABS:                        7.0    9.40  )-----------( 360      ( 02 Oct 2023 05:30 )             21.7     Na(133)/K(3.7)/Cl(98)/HCO3(27)/BUN(56)/Cr(1.94)Glu(173)/Ca(8.6)/Mg(2.00)/PO4(2.3)    10-02 @ 05:30  Na(136)/K(3.7)/Cl(97)/HCO3(27)/BUN(45)/Cr(1.47)Glu(126)/Ca(8.6)/Mg(2.10)/PO4(1.8)    10-01 @ 00:35  Na(130)/K(3.6)/Cl(95)/HCO3(26)/BUN(64)/Cr(1.99)Glu(256)/Ca(9.1)/Mg(2.20)/PO4(2.6)    09-30 @ 00:40      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    (2)RUSS - hemodynamic RUSS as of late last week - s/p HD 9/27, 9/28, and 9/30    (3)Hyponatremia - improved, with HD; now off salt tabs    (4)Pulm- vent-dependent    (5)ID - now on Meropenem IV - dosed for low GFR    (6)Anemia - warranting DAVID with HD    (6)CV - tenuous hemodynamics - off pressor gtts/on Midodrine    RECOMMEND:  (1)Next HD today - x 3h, 2kg UF as able, pressors as needed to keep SBP>90; Epogen with HD  (2)Dose new meds for GFR 10-15ml/min          Jimmy Colon MD  [facility]  Office/on call physician: [phone]  Cell (7a-7p): [phone]       on vent  not on pressor gtts  receiving 1/2U PRBCs   at bedside        VITAL:  T(C): , Max: 38.1 (10-01-23 @ 22:37)  T(F): , Max: 100.5 (10-01-23 @ 22:37)  HR: 100 (10-02-23 @ 08:05)  BP: 120/34 (10-02-23 @ 04:09)  BP(mean): --  RR: 20 (10-02-23 @ 04:09)  SpO2: 99% (10-02-23 @ 08:05)  oligoanuric      PHYSICAL EXAM:  Constitutional: minimally communicative at present; on vent  HEENT: trach-vent, (+)NGT  Respiratory: coarse BS b/l  Cardiovascular: tachy reg s1s2  Gastrointestinal: BS+, soft, NT/ND  Extremities: + peripheral edema  Neurological: tone WNL  : (+) Wheeler  Skin: No rashes  Access: (+)[facility]    LABS:                        7.0    9.40  )-----------( 360      ( 02 Oct 2023 05:30 )             21.7     Na(133)/K(3.7)/Cl(98)/HCO3(27)/BUN(56)/Cr(1.94)Glu(173)/Ca(8.6)/Mg(2.00)/PO4(2.3)    10-02 @ 05:30  Na(136)/K(3.7)/Cl(97)/HCO3(27)/BUN(45)/Cr(1.47)Glu(126)/Ca(8.6)/Mg(2.10)/PO4(1.8)    10-01 @ 00:35  Na(130)/K(3.6)/Cl(95)/HCO3(26)/BUN(64)/Cr(1.99)Glu(256)/Ca(9.1)/Mg(2.20)/PO4(2.6)    09-30 @ 00:40      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

## 2023-10-02 NOTE — PROGRESS NOTE ADULT - ASSESSMENT
74 y/o F well known to me from my \[facility]\"" outpt practice. she was admitted at [facility] 7/12-7/22 w aspiration PNA, was treated w CEFEPIME, developed an allergic rash,  dCHF, + MAC on AFB culture, had been progressively getting more and more lethargic and dyspneic at home since DC.   In  am of 8/11/23  ptn presented with respiratory distress w hypoxia and hypercarbia requiring intubation 2/2 volume overload +/- Asp PNA      Neuro   responds appropriately   Baseline MS AOx3, aphasic   - h/o CVA , on aspirin and statin . resumed w feeding tube, ASA resumed 8/26  - eeg  2/2 tremors, no sz focus  - less responsive in the past few days  - MRI 8/17:  new R cerebellar infarct, old left PCA/Occipital infarct. 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 Mcnulty, now in MICU  s/p septic shock overnight 9/1, now on meropenem, HDS  s/p trache, on vent support, copious secretions      Renal     Ptn well know to Dr. Cooln,following   HD 8/19,  8/21, 8/26 and 8/28.  s/p PUF 8/29 2.5 Liters, HD 8/30,  9/1, 9/4  L IJ HD placed  uremic  hyponatremic  HD as per renal        Hypotension  - Levo drip  prognosis is poor  consider palliative care consult    GI  NPO  on tube feeds  coffee ground emesis x 1 8/24, melena overnight 8/31, s/p 1UPRBC, EGD/Colonoscopy: erosive gastropathy, esophagisits, on colonoscopy old blood seen no active bleeding, poor prep  family to decide re PEG placement    Endocrine  T2DM   A1C 7.1 in 7/2023   - BG goal 140-200     ID   No fever/leukocytosis, recheck temp   Hx latent TB which was treated, no concern for TB   - grew MAC on AFB culture from most recent admission. at [facility]  -MSSA Bacteremia 9/1, allergic to cephalosprins and PCN, on Vanco as per ID, renal dosing,   - sputum also grew E.Coli-ESBL, as per ID recs for  [facility] through 9/7( 1 week course as per ID) , afebrile.  - 9/8:  spike  temp 38.1C, has O. externa, has a wick, recs are to get a CT to R/O an abscess/bony involvement. cont Meropenem  9/9: ptn w fever overnight to 100.8,  may need shiley pulled if bacteremic, needs NECK CT as per ENT to R/O Mastoid bone involvement while having ongoing purulent DC from L ear 2/2 O. externa, getting daily wick changes  9/10:  spiked 102 overnight through Bradford and Vanco, purulent DC from O. externa, ENT recommend Ct of mastoid. ptn in HD, has Shiley in place, consider pulling shiley and send for Cx. would d/w Renal, and consider contacting  ID, last seen by Dr. Conner 9/6 9/11: remains febrile, Dr. Conner reconsulted. cont Bradford, Vanco  9/12: tmax 100.5, fever curve is improving, awaiting Left ear CT, on Bradford since 9/1. on  vanco for MSSA Bacteremia, does not tolerate cephalosporins. through 10/2  9/13: on full vent support via trache, appears uncomfortable and onur 2/2 Left O. externa. has an incidental left middle ear tumor, no acute middle ear interventions recommended, left ear wick replaced. on Meropenem, cx from Ear DC is neg. On Vanco for MSSA bacteremia through 10/2, course of Meropenem as per ID, afebrile in the past 24 hrs . Cardura for HTN resumed, HGB dropped to 6.4, got a dose of EPO and 1UPRBC, rpt 7.8, remains on HEPARIN drip for LEFT popliteal DVT  9/14: cont on Mupirocin locally to Left ear, cont IV Bradford, ENT signed off, afebrile x 48 hrs, Mro course to be determined by ID, on Vanco for MSSA bacteremia through 10/2. ongoing worsening hyponatremia, Hypertonic saline as per renal, no HD today, s/p 1UPRBC 9/13  9/15: spiking temps again, if cont to spike as per ID re PAN scan. cont Vanco for MSSA Bacteremia  9/16-18: no temp overnight  afebrile, cont to have Left ear DC,   on Vanco and ,bradford through 10/2  On IV Fluconazole for fungal cx+ from O. externa Discharge.   fluconazole started 9/21, ptn developed an allergic rash on 9/22. doubt its 2/2 to MEROPENEM or Vanco.  MEropenem was DCed 2/2 causing possible drug rash.  on VANCO based on levels for MSSA bacteremia but DCed 2/2 poss drug rash, now on Daptomycin  9/29: ptn is septic, meropenem resumed, s/p HD 9/28, next HD tomorrow    Heme/Onc  ppx: place on SCD  Transfuse for hgb 6.4, no obv bleed. HDS  LEFT POPLITEAL VEIN ACUTE DVT on 9/10, on Heparin drip    Ethics  GOC - Discussed GOC with daughter and , they have opted in the past for full code. and she remains full code at present      9/27:  In [place], R IJ HD catheter placed, NGT in place, acidotic on VBG, trache to vent, anasarca, on IV BUMEX, on Levo, on Heparin drip, on stress dose HYdrocortisone, FS in range, uremic, awaiting HD, CT C/A/P w no acute findings. VANCO Dced , now on Dapto, for MSSA baceteremia through 10/2    9/28: HD started 9/27, still encephalopathic, fluconazole DCed as it could be the cause of the rash. on Dapto to complete a course of Abx for MSSA bacteremia through 10/2, VANCO DCed 2/2 possible rash. off pressors    9/30: sepsis resolved, off fluconazole, would add FLUCONAZOLE to the allergy list. on Meropenem, on Dapto through 10/2 instead of Vanco.Vanco was DCed as preseumed ptn had an allergic rash to Vanco. she is not allergic to Vanco. vent dependent. tolerating HD. s/p HD today, next on 10/3    10/1: back in RCU, trach to vent,  tolerating HD well, on Bradford, Dapto, off fluconazole 2/2 allergic rxn    10/2:  trache to vent, completed a course of ABx for MSSA bacteremia TODAY, sputum grew P. aeruginosa resistant to MEROPENEM, ptn was switched to ZOSYN. 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

## 2023-10-02 NOTE — PROGRESS NOTE ADULT - SUBJECTIVE AND OBJECTIVE BOX
S: No overnight events. Pt seen. Hgb remains low, s/p pRBC this AM      Medications: MEDICATIONS  (STANDING):  acetic acid 0.25% Topical Irrigation 1 Application(s) Topical two times a day  albuterol/ipratropium for Nebulization 3 milliLiter(s) Nebulizer every 6 hours  apixaban 10 milliGRAM(s) Oral every 12 hours  artificial tears (preservative free) Ophthalmic Solution 1 Drop(s) Right EYE every 4 hours  aspirin  chewable 81 milliGRAM(s) Enteral Tube daily  atorvastatin 10 milliGRAM(s) Oral at bedtime  chlorhexidine 0.12% Liquid 15 milliLiter(s) Oral Mucosa every 12 hours  chlorhexidine 2% Cloths 1 Application(s) Topical daily  dextrose 5%. 1000 milliLiter(s) (50 mL/Hr) IV Continuous <Continuous>  dextrose 5%. 1000 milliLiter(s) (100 mL/Hr) IV Continuous <Continuous>  dextrose 50% Injectable 12.5 Gram(s) IV Push once  dextrose 50% Injectable 25 Gram(s) IV Push once  dextrose 50% Injectable 25 Gram(s) IV Push once  epoetin odalis (EPOGEN) Injectable 76107 Unit(s) IV Push <User Schedule>  erythromycin   Ointment 1 Application(s) Left EYE four times a day  ferrous    sulfate Liquid 300 milliGRAM(s) Oral daily  glucagon  Injectable 1 milliGRAM(s) IntraMuscular once  hydrocortisone sodium succinate Injectable 25 milliGRAM(s) IV Push daily  insulin lispro (ADMELOG) corrective regimen sliding scale   SubCutaneous every 6 hours  insulin NPH human recombinant 11 Unit(s) SubCutaneous every 6 hours  midodrine. 30 milliGRAM(s) Oral every 8 hours  ofloxacin 0.3% Solution 1 Drop(s) Left EYE every 4 hours  pantoprazole  Injectable 40 milliGRAM(s) IV Push two times a day  petrolatum Ophthalmic Ointment 1 Application(s) Right EYE two times a day  sodium chloride 2 Gram(s) Oral two times a day  sodium chloride 3%  Inhalation 4 milliLiter(s) Inhalation every 6 hours  valACYclovir 500 milliGRAM(s) Oral every 24 hours    MEDICATIONS  (PRN):  acetaminophen   Oral Liquid .. 650 milliGRAM(s) Oral every 6 hours PRN Temp greater or equal to 38C (100.4F), Mild Pain (1 - 3)  artificial tears (preservative free) Ophthalmic Solution 1 Drop(s) Both EYES three times a day PRN Dry Eyes  calamine/zinc oxide Lotion 1 Application(s) Topical two times a day PRN Rash and/or Itching  dextrose Oral Gel 15 Gram(s) Oral once PRN Blood Glucose LESS THAN 70 milliGRAM(s)/deciliter  sodium chloride 0.9% lock flush 10 milliLiter(s) IV Push every 1 hour PRN Pre/post blood products, medications, blood draw, and to maintain line patency       Vitals:  Vital Signs Last 24 Hrs  T(C): 37.1 (02 Oct 2023 12:55), Max: 38.1 (01 Oct 2023 22:37)  T(F): 98.8 (02 Oct 2023 12:55), Max: 100.5 (01 Oct 2023 22:37)  HR: 99 (02 Oct 2023 15:54) (55 - 116)  BP: 114/63 (02 Oct 2023 12:55) (113/48 - 129/42)  BP(mean): --  RR: 22 (02 Oct 2023 12:55) (18 - 22)  SpO2: 100% (02 Oct 2023 15:54) (94% - 100%)    Parameters below as of 02 Oct 2023 15:54  Patient On (Oxygen Delivery Method): ventilator        Neurological Exam:  Mental Status: Eyes closed, off sedation. Does not follow commands,  + trach to vent and NGT   Cranial Nerves: PERRL slightly sluggish, L subconjunctival hemorrhage  Motor: Moves upper extremities spontaneously. no movement noted in lowers  Sensation: WD to noxious x4  Reflexes: 1+ throughout at biceps, brachioradialis, triceps, patellars and ankles bilaterally and equal. No clonus.     I personally reviewed the below data/images/labs:         LABS:                          7.0    9.40  )-----------( 360      ( 02 Oct 2023 05:30 )             21.7     10-02    133<L>  |  98  |  56<H>  ----------------------------<  173<H>  3.7   |  27  |  1.94<H>    Ca    8.6      02 Oct 2023 05:30  Phos  2.3     10-02  Mg     2.00     10-02    TPro  5.6<L>  /  Alb  2.3<L>  /  TBili  0.3  /  DBili  x   /  AST  53<H>  /  ALT  19  /  AlkPhos  307<H>  10-01    LIVER FUNCTIONS - ( 01 Oct 2023 00:35 )  Alb: 2.3 g/dL / Pro: 5.6 g/dL / ALK PHOS: 307 U/L / ALT: 19 U/L / AST: 53 U/L / GGT: x           PTT - ( 01 Oct 2023 00:35 )  PTT:28.9 sec  Urinalysis Basic - ( 02 Oct 2023 05:30 )    Color: x / Appearance: x / SG: x / pH: x  Gluc: 173 mg/dL / Ketone: x  / Bili: x / Urobili: x   Blood: x / Protein: x / Nitrite: x   Leuk Esterase: x / RBC: x / WBC x   Sq Epi: x / Non Sq Epi: x / Bacteria: x                < from: CT Head No Cont (08.15.23 @ 17:20) >    ACC: [id number] EXAM:  CT BRAIN   ORDERED BY: MINAL SAM     PROCEDURE DATE:  08/15/2023          INTERPRETATION:  Clinical indication: Change in neuro exam.    Multiple axial sections were performed from base to vertex without   contrast enhancement. Coronal and sagittal reconstructions were   reformatted well.    This exam is compared prior head CT performed on August 11, 2023    Parenchymal volume loss and chronic microvessel ischemic changes are   again seen.    Abnormal low-attenuation involving the left occipital cortical   subcortical region is again seen. 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    No evidence of acute hemorrhage mass or mass effect is seen.    Evaluation of the osseous structures with appropriate window demonstrates   sclerotic changes about the left mastoid region which appears stable.   Opacification left middle ear region is again seen.    Patient is status post bilateral cataract surgery.    Impression: Stable exam.    < end of copied text >    vEEG:    Clinical Impression:  - Severe diffuse non-specific cerebral dysfunction  - There were no epileptiform abnormalities or seizures recorded.        CTH 8/11:    VENTRICLES AND SULCI: Age-appropriate involutional change  INTRA-AXIAL:  Old left PCA infarct as seen on the prior unchanged.   Microvascular ischemic changes involving the periventricular and   subcortical white matter as seen previously  EXTRA-AXIAL:  No mass or collection is seen.  VISUALIZED SINUSES:  Clear.  VISUALIZED MASTOIDS: Left mastoid sclerosis  CALVARIUM: Infiltrative appearance tothe calvarium may be indicative of   marrow infiltration on the basis of patient's known diagnosis of breast   cancer. MISCELLANEOUS:  None.    IMPRESSION:  No significant interval change compared with 7/17/2023 in   left PCA infarct which is old. Microvascular ischemic changes involving   the periventricular and subcortical white matter as seen   previously.Questionable lesions at the level of the calvarium related to   possible breast CA. Clinical correlation recommended.    --- End of Report ---      ct< from: CT Head No Cont (09.26.23 @ 21:02) >  IMPRESSION: Vague questionable areas of hypoattenuation within the   bilateral cerebellar hemispheres which may be compatible with   acute/subacute ischemia. Recommend further evaluation with a brain MRI   study, provided there are no MRI contraindications.    No acute intracranial hemorrhage.    Previously seen questionable vague wedge-shaped area of hypoattenuation   in the left parietal lobe with artifactual secondary to motion and volume   averaging with a prominent sulcus.    Chronic left occipital lobe infarct.    < end of copied text >

## 2023-10-02 NOTE — PROVIDER CONTACT NOTE (CRITICAL VALUE NOTIFICATION) - BACKGROUND
Respiratory Failure
Respiratory Failure
Respiratory failure, HTN
Pt admitted for respiratory failure
prior false lows
Dx Respiratory failure 2/2 ASP PNA
Patient admitted for fluid overload and hx of gi bleed
Admitted for Respiratory failure

## 2023-10-02 NOTE — CHART NOTE - NSCHARTNOTEFT_GEN_A_CORE
Notified by RN that patient w/ rash. Patient has had similar rash to torso before following antibiotics. Patient s/p 2 doses of Zosyn today. Will stop Zosyn this evening. Will give Benadryl x1. Please fu w/ ID regarding Abx alternative given multiple allergies and multiple documented rashes in past. Patient also noted w/ fever to 101 axillary at this time. Will repeat Blood Cx x2, CBC w/ diff, and VBG w/ lactate. Tylenol PRN for fever. Will continue to monitor closely. Notified by RN that patient w/ rash. Patient has had similar rash to torso before following antibiotics. Patient s/p 2 doses of Zosyn today. Will stop Zosyn this evening. Will give Benadryl x1. Please fu w/ ID regarding Abx alternative given multiple allergies and multiple documented rashes in past. Patient also noted w/ fever to 101 axillary at this time. Will repeat Blood Cx x2, CBC w/ diff, and VBG w/ lactate. Tylenol PRN for fever. Will continue to monitor closely.    Addendum 4:45:  Post transfusion CBC w/ Hgb 7.7. Goal Hgb > 8. will transfuse additional 1/2 PRBC now as patient not scheduled for HD today. FU post transfusion CBC.

## 2023-10-02 NOTE — PROGRESS NOTE ADULT - ASSESSMENT
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 While in MICU patient noted with worsening renal failure requiring HD initiation, CGE/ Melena s/p EGD 8/31 found with esophagitis and erosive gastropathy, new right cerebellar infarct and fevers second to ESBL COLI and MSSA VAP, MSSA bacteremia, left ear otitis externa and drug rxn to cephalosporins. Course further complicated by prolonged vent time s/p tracheostomy 9/1 and transferred to RCU 9/3 completed by recurrent fevers, high PIPs with hypervolemia, mucous plug and tracheomalacia with dynamic collapse, progressive left ear OM, and left eye conjunctivitis? Case discussed at length renal and attempted renal recovery however failed and R IJ SHILEY failed. Attempted volume removal with Bumex GTT however course complicated by hypotension requiring transfer back to MICU 9/26. Diuresis dc'ed, pressors weaned off and stress steroids started. Called Optho and left eye with possible uveitits and Ofloxacin drops, Valcyte PO, Erythromycin drops, and artificial tears/ lacrilube continued. L IJ SHILEY placed (9/30) and HD reinstated with good volume removal. Course further complicated by AOCD and  PSA and Proteus VAP and Bradford and DAPTO continued. Patient transferred back to RCU 10/1. 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 While in MICU patient noted with worsening renal failure requiring HD initiation, CGE/ Melena s/p EGD 8/31 found with esophagitis and erosive gastropathy, new right cerebellar infarct and fevers second to ESBL COLI and MSSA VAP, MSSA bacteremia, left ear otitis externa and drug rxn to cephalosporins. Course further complicated by prolonged vent time s/p tracheostomy 9/1 and transferred to RCU 9/3 completed by recurrent fevers, high PIPs with hypervolemia, mucous plug and tracheomalacia with dynamic collapse, progressive left ear OM, and left eye conjunctivitis? Case discussed at length renal and attempted renal recovery however failed and R IJ SHILEY failed. Attempted volume removal with Bumex GTT however course complicated by hypotension requiring transfer back to MICU 9/26. Diuresis dc'ed, pressors weaned off and stress steroids started. Called Optho and left eye with possible uveitits and Ofloxacin drops, Valcyte PO, Erythromycin drops, and artificial tears/ lacrilube continued. L IJ SHILEY placed (9/30) and HD reinstated with good volume removal. Course further complicated by AOCD and  PSA and Proteus VAP and Bradford and DAPTO continued. Patient transferred back to RCU 10/1.    NEUROLOGY  # NEW cerebella infarct   - Baseline MS AOX3 with short term memory changes per family however noted with concern for poor mentation in ICU  - MRI (8/17) with NEW right cerebellar infarct and old left PCA/occipital infarcts  - STEPHANIE (8/17) with AV showing two linear mobile echogenic elements attached to valve leaflets consistent with Lambel's excrescence, but no TRINITY thrombus, and lambel's excrescence with uncertain clinical implication.   - EEG (8/11-8/15) with no seizures   - ILR interrogation (9/7) with SR and PACs falsely recorded as AF.   - Attempted to resume ASA for medical management but held given GIB   - Continue on lipitor for medical management   - Course complicated by questionable head and body shaking 9/8 however prolactin elevated. Discussed for spot EEG however held given low likelihood of seizures noted and EEG held.     # Metabolic vs Uremic Encephalopathy   - Lethargy noted with progression to stupor thought to be second to uremia.   - RPT CTH (9/26) with vague areas of hypoattenuation within the bilateral cerebellar hemispheres which may be compatible with acute/subacute ischemia.   - Discussed CTH with neurology and no signs of new infarct noted.   - HD reinstated in ICU with improvement in uremia however mentation remained   - Poor mentation likely in setting of infections.   - Monitor mentation closely     CARDIOVASCULAR  # HTN Urgency   # Septic vs vasoplegic shock   - Labile BPs ranging in between SBP 80s-200s and attempted labetalol, however noted with hypotension and bradycardia likely from BB toxicity vs shock state?    - Holding Labetalol, Catapres and Catapres.    - Attempted volume removal with bumex diuresis however noted with return of shock state requiring pressor support and transfer back to ICU.   - Pressors weaned off to stress dose and Midodrine   - Wean off Solucortef 50 TID (9/27-9/29) then Solucortef 25 TID (9/29-10/2) and now on Solucortef 25 QD (10/2 - 10/4) then DONE  - Continue on Midodrine 30 TID (9/29 - ) and wean as tolerated     # Hx of HFpEF with likely ADHF with volume overload.   - ECHO (7/2023) with EF 59 with severe LVH and diastolic dysfunction   - STEPHANIE (8/18) with normal LVRVSF however noted with increased LA pressures and persistent LA septal bowing into RA.   - ECHO (8/11) with EF 60 with mild LVH, normal LVRVSF, and mild ddfxn.  - Remove volume with HD sessions     HEENT   # Left ear OE   - ENT evaluation (9/7) with no mastoid tenderness, however found with positive swelling and excoriation to left auricle and tenderness to manipulation of auricle. Debrided crusting of auricle and EAC evaluated with edema and unable to visualize TM. EAC debrided and found with watery exudate.   - CT IAC with acute on chronic left-sided otitis externa and acute on chronic left-sided otomastoiditis. Superimposed left-sided tympanosclerosis. Superimposed cholesteatoma formation within the left tympanomastoid cavity cannot be excluded  - Continued on CiproHC (9/5 - 9/16) and Bradford (9/1-10/1)   - Case discussed with ENT at length and now continued on acetic acid drops BID to left ear and bactroban ointment to left pinna BID.     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   - Patient with tongue pain and seen with anterior ulcerations consolidating and crusting and posterior ulceration.  - Case discussed with pathology resident and culture/ cytology sent.   - HSV negative and Path (9/6) with normal appearing epithelial cells singly and in clusters devoid of viral cytopathic effect.   - Continue on magic mouth wash for pain    RESPIRATORY  # AHHRF second to volume overload   - Hx of CKD and HFpEF presented with SOB and hypercarbia second to volume overload requiring intubation and HD initiation   - Vent weaning attempted however limited requiring tracheostomy (9/1) with IP   - Course complicated by PIPs elevated and found with tracheomalacia.   - CT CHEST (9/8) with tracheomalacia, BL pleural effusions and continued consolidations   - Continue on vent and given TBM increased PEEP (9/9) to 20/300/8/40 with improvement   - Continue on duonebs for airway clearance   - SBT when mentation improves.      GI  # UGIB   - CGE x 1 episode on 8/24 and melena x 2 episodes on 8/31 likely residual blood.   - EGD (8/31) found with esophagitis and erosive gastropathy  - Continue on PPI BID through late October and then PPI QD     # Dysphagia   - NGT-TF continued   - Pending PEG however needs improvement prior to placement.     RENAL  # Progressive CKD   - Presented volume overload and progressive RUSS on CKD   - POCUS with no signs of hydronephrosis in ICU  - Pressor support started with improvement in renal function in ICU  - Attempted steroid course in ICU without improvement in renal function   - Case discussed at length with renal and initiated on HD in ICU   - Remain on HD while in RCU however noted to be volume overloaded. Attempted Bumex pushes and patient noted with good UOP.   - Attempted renal recovery in RCU however noted with decent UOP, but BUN/ CRE continued to rise without improvement on diuresis.   - Ultimately resumed on HD (9/27, 9/28, 9/30 and 10/2)   - Continue on HD with aggressive UF as tolerated per Renal    # Hyponatremia   - Continue on salt tabs 2G    - Monitor sodium     # Hyperphosphatemia to Hypophosphatemia with HD  - PTH normal and elevated phos likely from renal failure  - Phos binder with Renvela started with improvement however then noted with hypophosphatemia and Renvela stopped.     INFECTIOUS DISEASE  # ESBL ECOLI and MSSA VAP c/b MSSA bacteremia   - RVP/ COVID (8/11) negative   - SCx (8/11) with MSSA s/p cefepime (8/12-8/13) and then ancef (8/14) however noted with drug reaction and then changed to vanco and aztreonam (8/12-8/13) in ICU .   - Course complicated by recurrent fevers and return of MSSA with bacteremia.   - SCx (9/1) with MSSA and ESBL ECOLI   - BAL (9/1) with MSSA   - BCx (9/1) with MSSA and cleared on (9/4)   - ECHO (9/6) unable to rule out endocarditis   - Continue on Vanco (9/4-9/22) however noted with rashes of uncertain etiology and replaced with Bradford (9/4 - 10/2) and DAPTO (9/26-10/2) for  completion.     # Proteus and  PSA VAP/ Trachitis   - Continued fevers and SCx (9/29) with  PSA and Proteus   - Continued on Bradford as above however noted to be resistant and now changed to Zosyn (10/2 - ). MONITOR CLOSELY GIVEN DRUG RXN IN PAST!    # MRSA PCRs   - MRSA PCR (8/11 and 8/14) negative   - MRSA PCR (9/10) with MSSA and s/p bactroban in ICU   - MRSA PCR (9/26) with MSSA and s/p bactroban in ICU   - Next MRSA PCR (10/22)     HEME  # Anemia second to GIB vs renal disease   - s/p multiple units of PRBCs   - Anemia panel with AOCD but with low %sat and ferrous sulfate added to optimize   - Despite iron intermittent anemia noted without bleeding source and EPO added.   - HH dropped (10/2) and s/p 1U PRBC.   - Monitor HH     VASCULAR   # LLE POP DVT   - US BL LE (9/10) with acute left deep vein thrombosis of the left popliteal vein.  - RUE swollen and pending VA DOPPLER.   - Continue on Eliquis     ONC   # Hx of Breast CA   - Patient dx in 2018 and s/p BL mastectomy (radical on right) and chemo and RT.   - Supportive care.     ENDOCRINE  # IDDM2   - Continue on NPH 3U and ISS   - Monitor FS and adjust as needed     LINES/ TUBES  - PACO TAYLOR (9/27 - )     SKIN  # Drug Eruption   - Attempted cefepime (8/12) vs ancef (8/13-8/14) and then noted with drug rxn.   - Hold further cephalosporins at this time   - Continue on steroids with prednisone 40 (8/18 - 9/2) then prednisone 30 (9/3-9/7), then prednisone 20 (9/8 - 9/10), then prednisone 10mg (9/11-9/13) then turn off.   - Monitor for further drug rxns.     ETHICS/ GOC    - FULL CODE     DISPO - TBD

## 2023-10-02 NOTE — PROGRESS NOTE ADULT - SUBJECTIVE AND OBJECTIVE BOX
[facility] DEPARTMENT OF OPHTHALMOLOGY  ------------------------------------------------------------------------------  Latricia France MD, PGY-3  Contact: TEAMS  ------------------------------------------------------------------------------    Interval History: No acute events overnight.     MEDICATIONS  (STANDING):  acetic acid 0.25% Topical Irrigation 1 Application(s) Topical two times a day  albuterol/ipratropium for Nebulization 3 milliLiter(s) Nebulizer every 6 hours  apixaban 10 milliGRAM(s) Oral every 12 hours  artificial tears (preservative free) Ophthalmic Solution 1 Drop(s) Right EYE every 4 hours  aspirin  chewable 81 milliGRAM(s) Enteral Tube daily  atorvastatin 10 milliGRAM(s) Oral at bedtime  chlorhexidine 0.12% Liquid 15 milliLiter(s) Oral Mucosa every 12 hours  chlorhexidine 2% Cloths 1 Application(s) Topical daily  dextrose 5%. 1000 milliLiter(s) (50 mL/Hr) IV Continuous <Continuous>  dextrose 5%. 1000 milliLiter(s) (100 mL/Hr) IV Continuous <Continuous>  dextrose 50% Injectable 12.5 Gram(s) IV Push once  dextrose 50% Injectable 25 Gram(s) IV Push once  dextrose 50% Injectable 25 Gram(s) IV Push once  epoetin odalis (EPOGEN) Injectable 52620 Unit(s) IV Push <User Schedule>  erythromycin   Ointment 1 Application(s) Left EYE four times a day  ferrous    sulfate Liquid 300 milliGRAM(s) Oral daily  glucagon  Injectable 1 milliGRAM(s) IntraMuscular once  hydrocortisone sodium succinate Injectable 25 milliGRAM(s) IV Push daily  insulin lispro (ADMELOG) corrective regimen sliding scale   SubCutaneous every 6 hours  insulin NPH human recombinant 11 Unit(s) SubCutaneous every 6 hours  midodrine. 30 milliGRAM(s) Oral every 8 hours  ofloxacin 0.3% Solution 1 Drop(s) Left EYE every 4 hours  pantoprazole  Injectable 40 milliGRAM(s) IV Push two times a day  petrolatum Ophthalmic Ointment 1 Application(s) Right EYE two times a day  sodium chloride 2 Gram(s) Oral two times a day  sodium chloride 3%  Inhalation 4 milliLiter(s) Inhalation every 6 hours  valACYclovir 500 milliGRAM(s) Oral every 24 hours    MEDICATIONS  (PRN):  acetaminophen   Oral Liquid .. 650 milliGRAM(s) Oral every 6 hours PRN Temp greater or equal to 38C (100.4F), Mild Pain (1 - 3)  artificial tears (preservative free) Ophthalmic Solution 1 Drop(s) Both EYES three times a day PRN Dry Eyes  calamine/zinc oxide Lotion 1 Application(s) Topical two times a day PRN Rash and/or Itching  dextrose Oral Gel 15 Gram(s) Oral once PRN Blood Glucose LESS THAN 70 milliGRAM(s)/deciliter  sodium chloride 0.9% lock flush 10 milliLiter(s) IV Push every 1 hour PRN Pre/post blood products, medications, blood draw, and to maintain line patency      VITALS: T(C): 36.7 (10-02-23 @ 16:00)  T(F): 98.1 (10-02-23 @ 16:00), Max: 100.5 (10-01-23 @ 22:37)  HR: 98 (10-02-23 @ 16:00) (55 - 116)  BP: 124/44 (10-02-23 @ 16:00) (113/48 - 124/44)  RR:  (18 - 22)  SpO2:  (97% - 100%)  Wt(kg): --  General: AAO x 0, appropriate mood and affect    KENDY VA, EOM, color plates, CVF 2/2 patient status.    Pen light exam:  External: Flat OU  Lids/Lashes/Lacrimal Ducts: Flat OU    Sclera/Conjunctiva: white with trace chemosis OD. Conjunctivochalasis OU. Trace Subconjunctival hemorrhage OS. trace chemosis OS.  Cornea: Cl OD. D-folds centrally OS. No dellen OS. 1+ SPK OU. No epi defects OU.  Anterior Chamber: D+F OU. No hypopyon OS.   Iris: Flat OU  Lens: Cl OU

## 2023-10-02 NOTE — PROGRESS NOTE ADULT - ASSESSMENT
Assessment and Recommendations:  73y female with a past medical history/ocular history of DM, HTN, CKD, breast CA, respiratory arrest and cardiac arrest (2018), CVA with residual weakness, aspiration PNA h/o trache, PEG, Diabetic retinopathy, consulted for red left eye, found to have left sided injection, chemosis, and descemet membrane folds initially concerning for early infection vs. HSV, now thought to be secondary to significant chemosis OS.    #Chemosis OD  # Hemorraghic Chemosis OS  # POOJA OU  - Unable to test patient's visual acuity, color vision, EOM, due to current status  - Anterior exam with significantly improved chemosis OU, and significantly improved subconj hemorrhage OS. Still with diffuse d-folds OS.  - Significant SPK OU  - Delle OS appears resolved today  - D-folds likely secondary to significant chemosis   - Stop Ofloxacin given decreased concern for infection at this time.  - Continue lacrilube ointment twice a day both eyes  - Can stop taping left eye shut given improved closure of eyelid with decreased chemosis today  - Continue preservative free artificial tears 4 times a day in both eyes eye (ordered by ophtho)  - Continue Valtrex 500 TID (if pt can tolerate per primary team) for coverage of possible HSV  - Discussed findings with primary team       Outpatient Follow-up: Patient should follow-up with his/her ophthalmologist or with [facility] Department of Ophthalmology within 1 week of after discharge at:    [address]  [place] [address]  [phone]    Latricia France MD, PGY-3  Contact: Jamgle

## 2023-10-02 NOTE — PROGRESS NOTE ADULT - SUBJECTIVE AND OBJECTIVE BOX
Subjective: Patient seen and examined. No new events except as noted.   hgb remains low     REVIEW OF SYSTEMS:    Unable to obtain     MEDICATIONS:  MEDICATIONS  (STANDING):  acetic acid 0.25% Topical Irrigation 1 Application(s) Topical two times a day  albuterol/ipratropium for Nebulization 3 milliLiter(s) Nebulizer every 6 hours  apixaban 10 milliGRAM(s) Oral every 12 hours  artificial tears (preservative free) Ophthalmic Solution 1 Drop(s) Right EYE every 4 hours  aspirin  chewable 81 milliGRAM(s) Enteral Tube daily  atorvastatin 10 milliGRAM(s) Oral at bedtime  chlorhexidine 0.12% Liquid 15 milliLiter(s) Oral Mucosa every 12 hours  chlorhexidine 2% Cloths 1 Application(s) Topical daily  DAPTOmycin IVPB 500 milliGRAM(s) IV Intermittent once  dextrose 5%. 1000 milliLiter(s) (50 mL/Hr) IV Continuous <Continuous>  dextrose 5%. 1000 milliLiter(s) (100 mL/Hr) IV Continuous <Continuous>  dextrose 50% Injectable 12.5 Gram(s) IV Push once  dextrose 50% Injectable 25 Gram(s) IV Push once  dextrose 50% Injectable 25 Gram(s) IV Push once  epoetin odalis (EPOGEN) Injectable 65710 Unit(s) IV Push <User Schedule>  erythromycin   Ointment 1 Application(s) Left EYE four times a day  ferrous    sulfate Liquid 300 milliGRAM(s) Oral daily  glucagon  Injectable 1 milliGRAM(s) IntraMuscular once  hydrocortisone sodium succinate Injectable 25 milliGRAM(s) IV Push daily  insulin lispro (ADMELOG) corrective regimen sliding scale   SubCutaneous every 6 hours  insulin NPH human recombinant 11 Unit(s) SubCutaneous every 6 hours  midodrine. 30 milliGRAM(s) Oral every 8 hours  ofloxacin 0.3% Solution 1 Drop(s) Left EYE every 4 hours  pantoprazole  Injectable 40 milliGRAM(s) IV Push two times a day  petrolatum Ophthalmic Ointment 1 Application(s) Right EYE two times a day  piperacillin/tazobactam IVPB.- 3.375 Gram(s) IV Intermittent once  sodium chloride 2 Gram(s) Oral two times a day  sodium chloride 3%  Inhalation 4 milliLiter(s) Inhalation every 6 hours  valACYclovir 500 milliGRAM(s) Oral every 24 hours      PHYSICAL EXAM:  T(C): 37.1 (10-02-23 @ 12:55), Max: 38.1 (10-01-23 @ 22:37)  HR: 101 (10-02-23 @ 12:55) (55 - 116)  BP: 114/63 (10-02-23 @ 12:55) (113/48 - 129/42)  RR: 22 (10-02-23 @ 12:55) (18 - 22)  SpO2: 98% (10-02-23 @ 12:36) (94% - 99%)  Wt(kg): --  I&O's Summary    01 Oct 2023 07:01  -  02 Oct 2023 07:00  --------------------------------------------------------  IN: 950 mL / OUT: 0 mL / NET: 950 mL          Appearance: NAD, +trach  HEENT: dry oral mucosa  Lymphatic: No lymphadenopathy  Cardiovascular: Normal S1 S2, No JVD, No murmurs, No edema  Respiratory: Decreased BS, + trach to vent	  Neuro: opens eyes  Gastrointestinal: Soft, Non-tender, + BS, + NGT  Skin: No rashes, No ecchymoses, No cyanosis	  Extremities: No strength/ROM 2/2 sedation, + BL LE edema  Vascular: Peripheral pulses palpable 2+ bilaterally  Mode: AC/ CMV (Assist Control/ Continuous Mandatory Ventilation), RR (machine): 22, TV (machine): 340, FiO2: 35, PEEP: 8, ITime: 0.81, MAP: 20, PIP: 55          LABS:    CARDIAC MARKERS:                                7.0    9.40  )-----------( 360      ( 02 Oct 2023 05:30 )             21.7     10-02    133<L>  |  98  |  56<H>  ----------------------------<  173<H>  3.7   |  27  |  1.94<H>    Ca    8.6      02 Oct 2023 05:30  Phos  2.3     10-02  Mg     2.00     10-02    TPro  5.6<L>  /  Alb  2.3<L>  /  TBili  0.3  /  DBili  x   /  AST  53<H>  /  ALT  19  /  AlkPhos  307<H>  10-01    proBNP:   Lipid Profile:   HgA1c:   TSH:             TELEMETRY: 	    ECG:  	  RADIOLOGY:   DIAGNOSTIC TESTING:  [ ] Echocardiogram:  [ ]  Catheterization:  [ ] Stress Test:    OTHER:

## 2023-10-03 NOTE — PROGRESS NOTE ADULT - REASON FOR ADMISSION
DO MUSA Sheppard, A Ob Gyn Nurse Msg Pool  Glucose is abnormal, pt will need 3 hour      Respiratory distress

## 2023-10-03 NOTE — PROGRESS NOTE ADULT - ASSESSMENT
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 While in MICU patient noted with worsening renal failure requiring HD initiation, CGE/ Melena s/p EGD 8/31 found with esophagitis and erosive gastropathy, new right cerebellar infarct and fevers second to ESBL COLI and MSSA VAP, MSSA bacteremia, left ear otitis externa and drug rxn to cephalosporins Course further complicated by prolonged vent time s/p tracheostomy 9/1 and transferred to RCU 9/3 completed by recurrent fevers with high PIP, respiratory acidosis and concern for volume overloaded.   CTH repeated 9/26 for concern for encephalopathy - has known now - chronic bilateral cerebellar infarcts, L PCA infarct. L parietal hypodensity seen on prior CT likely artifactual    Impression:   Metabolic encephalopathy related to shock, infection, doubt new stroke  L PCA stroke, ESUS s/p ILR, also with bilateral centrum semiovale watershed infarcts   Multiple embolic strokes on MRI 8/17 - R cerebellum, midbrain, multiple other tiny embolic infarcts.   Dementia   MSSA bacteremia, r/o endocarditis s/p Daptomycin  Acute popliteal DVT on Eliquis   Anemia     Recommendations   [] WBC increasing - consider repeat infectious workup - back on Zosyn  [] New CTH from 9/26 without any evidence of acute infarct -> can consider repeat CTH down the line in mental status does not improve with treatment of underlying metabolic derangements and infections  [] GI following for coffee ground emesis - esophagitis seen on colonoscopy, monitor for bleeding now that on heparin gtt  [] family declined kidney biopsy for AIN -> s/p steroid tx   [] C/w home ASA 81 mg if no contraindications   [] hep gtt transitioned to ELiquis -> ?consider holding and IVC filter given anemia   [] C/w home Atorvastatin 10 mg qhs   [] would interrogate ILR and review tele to see if pAF -. no AF seen  [] DVT/GI ppx - hep gtt   [] Neurochecks q4hr   [] BP goals: Normotension - on pressors  [] PT/OT when able   [] HgA1C goals < 7.0   [] continue to address GOC with family as pts  and daughter continue to remain hopeful    d/w     Katty Charles DO  Vascular Neurology  Office [phone]

## 2023-10-03 NOTE — PROGRESS NOTE ADULT - SUBJECTIVE AND OBJECTIVE BOX
CHIEF COMPLAINT:Patient is a 73y old  Female who presents with a chief complaint of Respiratory distress (02 Oct 2023 19:04)      INTERVAL EVENTS:     ROS: Seen by bedside during AM rounds     OBJECTIVE:  ICU Vital Signs Last 24 Hrs  T(C): 37.4 (03 Oct 2023 07:00), Max: 38.6 (02 Oct 2023 23:40)  T(F): 99.3 (03 Oct 2023 07:00), Max: 101.5 (02 Oct 2023 23:40)  HR: 104 (03 Oct 2023 07:00) (55 - 126)  BP: 117/46 (03 Oct 2023 07:00) (99/39 - 124/44)  BP(mean): --  ABP: --  ABP(mean): --  RR: 22 (03 Oct 2023 07:00) (18 - 22)  SpO2: 99% (03 Oct 2023 07:00) (95% - 100%)    O2 Parameters below as of 03 Oct 2023 07:00  Patient On (Oxygen Delivery Method): ventilator, AC    O2 Concentration (%): 40      Mode: AC/ CMV (Assist Control/ Continuous Mandatory Ventilation), RR (machine): 22, TV (machine): 340, FiO2: 40, PEEP: 8, ITime: 0.8, MAP: 16, PIP: 42    10-02 @ 07:01  -  10-03 @ 07:00  --------------------------------------------------------  IN: 1360 mL / OUT: 2400 mL / NET: -1040 mL      CAPILLARY BLOOD GLUCOSE      POCT Blood Glucose.: 148 mg/dL (03 Oct 2023 05:59)      PHYSICAL EXAM:  General:   HEENT:   Lymph Nodes:  Neck:   Respiratory:   Cardiovascular:   Abdomen:   Extremities:   Skin:   Neurological:  Psychiatry:    Mode: AC/ CMV (Assist Control/ Continuous Mandatory Ventilation)  RR (machine): 22  TV (machine): 340  FiO2: 40  PEEP: 8  ITime: 0.8  MAP: 16  PIP: 42      HOSPITAL MEDICATIONS:  MEDICATIONS  (STANDING):  acetic acid 0.25% Topical Irrigation 1 Application(s) Topical two times a day  albuterol/ipratropium for Nebulization 3 milliLiter(s) Nebulizer every 6 hours  apixaban 10 milliGRAM(s) Oral every 12 hours  artificial tears (preservative free) Ophthalmic Solution 1 Drop(s) Both EYES every 4 hours  aspirin  chewable 81 milliGRAM(s) Enteral Tube daily  atorvastatin 10 milliGRAM(s) Oral at bedtime  chlorhexidine 0.12% Liquid 15 milliLiter(s) Oral Mucosa every 12 hours  chlorhexidine 2% Cloths 1 Application(s) Topical daily  dextrose 5%. 1000 milliLiter(s) (50 mL/Hr) IV Continuous <Continuous>  dextrose 5%. 1000 milliLiter(s) (100 mL/Hr) IV Continuous <Continuous>  dextrose 50% Injectable 12.5 Gram(s) IV Push once  dextrose 50% Injectable 25 Gram(s) IV Push once  dextrose 50% Injectable 25 Gram(s) IV Push once  epoetin odalis (EPOGEN) Injectable 92137 Unit(s) IV Push <User Schedule>  ferrous    sulfate Liquid 300 milliGRAM(s) Oral daily  glucagon  Injectable 1 milliGRAM(s) IntraMuscular once  hydrocortisone sodium succinate Injectable 25 milliGRAM(s) IV Push daily  insulin lispro (ADMELOG) corrective regimen sliding scale   SubCutaneous every 6 hours  insulin NPH human recombinant 11 Unit(s) SubCutaneous every 6 hours  midodrine. 30 milliGRAM(s) Oral every 8 hours  pantoprazole  Injectable 40 milliGRAM(s) IV Push two times a day  petrolatum Ophthalmic Ointment 1 Application(s) Both EYES two times a day  sodium chloride 2 Gram(s) Oral two times a day  sodium chloride 3%  Inhalation 4 milliLiter(s) Inhalation every 6 hours  valACYclovir 500 milliGRAM(s) Oral every 24 hours    MEDICATIONS  (PRN):  acetaminophen   Oral Liquid .. 650 milliGRAM(s) Oral every 6 hours PRN Temp greater or equal to 38C (100.4F), Mild Pain (1 - 3)  calamine/zinc oxide Lotion 1 Application(s) Topical two times a day PRN Rash and/or Itching  dextrose Oral Gel 15 Gram(s) Oral once PRN Blood Glucose LESS THAN 70 milliGRAM(s)/deciliter  sodium chloride 0.9% lock flush 10 milliLiter(s) IV Push every 1 hour PRN Pre/post blood products, medications, blood draw, and to maintain line patency      LABS:                        7.7    11.17 )-----------( 348      ( 03 Oct 2023 02:49 )             23.5     10-03    138  |  99  |  40<H>  ----------------------------<  141<H>  3.6   |  27  |  1.41<H>    Ca    8.4      03 Oct 2023 02:49  Phos  1.6     10-03  Mg     1.90     10-03        Urinalysis Basic - ( 03 Oct 2023 02:49 )    Color: x / Appearance: x / SG: x / pH: x  Gluc: 141 mg/dL / Ketone: x  / Bili: x / Urobili: x   Blood: x / Protein: x / Nitrite: x   Leuk Esterase: x / RBC: x / WBC x   Sq Epi: x / Non Sq Epi: x / Bacteria: x      Arterial Blood Gas:  10-03 @ 02:49  7.43/44/101/29/98.5/4.4  ABG lactate: --     CHIEF COMPLAINT:Patient is a 73y old  Female who presents with a chief complaint of Respiratory distress (02 Oct 2023 19:04)      INTERVAL EVENTS: temp overnight (101.5f tmax). Noted with rash to Zosyn, abx switched to Aztreonam today. Family reporting decline in MS. Ammonia level normal. CTH ordered.     ROS: Unable to obtain ROS given patient is nonverbal in s/o poor mentation.     OBJECTIVE:  ICU Vital Signs Last 24 Hrs  T(C): 37.4 (03 Oct 2023 07:00), Max: 38.6 (02 Oct 2023 23:40)  T(F): 99.3 (03 Oct 2023 07:00), Max: 101.5 (02 Oct 2023 23:40)  HR: 104 (03 Oct 2023 07:00) (55 - 126)  BP: 117/46 (03 Oct 2023 07:00) (99/39 - 124/44)  BP(mean): --  ABP: --  ABP(mean): --  RR: 22 (03 Oct 2023 07:00) (18 - 22)  SpO2: 99% (03 Oct 2023 07:00) (95% - 100%)    O2 Parameters below as of 03 Oct 2023 07:00  Patient On (Oxygen Delivery Method): ventilator, AC    O2 Concentration (%): 40      Mode: AC/ CMV (Assist Control/ Continuous Mandatory Ventilation), RR (machine): 22, TV (machine): 340, FiO2: 40, PEEP: 8, ITime: 0.8, MAP: 16, PIP: 42    10-02 @ 07:01  -  10-03 @ 07:00  --------------------------------------------------------  IN: 1360 mL / OUT: 2400 mL / NET: -1040 mL      CAPILLARY BLOOD GLUCOSE      POCT Blood Glucose.: 148 mg/dL (03 Oct 2023 05:59)      PHYSICAL EXAM:  General: NAD   HEENT: (+)NGT in nare.  Neck: (+) Trach tube noted, site c/d/i.  Cards: S1/S2, no murmurs   Pulm: CTA bilaterally. No wheezes.   Abdomen: Soft, (+)significant abd wall edema.  Extremities: Extremities warm to touch.  Neurology: Somnolent but rouses somewhat to tactile stimuli. Nonverbal. Not following commands.   Skin: warm to touch, color appropriate for ethnicity. Refer to RN assessment for further details.    Mode: AC/ CMV (Assist Control/ Continuous Mandatory Ventilation)  RR (machine): 22  TV (machine): 340  FiO2: 40  PEEP: 8  ITime: 0.8  MAP: 16  PIP: 42      HOSPITAL MEDICATIONS:  MEDICATIONS  (STANDING):  acetic acid 0.25% Topical Irrigation 1 Application(s) Topical two times a day  albuterol/ipratropium for Nebulization 3 milliLiter(s) Nebulizer every 6 hours  apixaban 10 milliGRAM(s) Oral every 12 hours  artificial tears (preservative free) Ophthalmic Solution 1 Drop(s) Both EYES every 4 hours  aspirin  chewable 81 milliGRAM(s) Enteral Tube daily  atorvastatin 10 milliGRAM(s) Oral at bedtime  chlorhexidine 0.12% Liquid 15 milliLiter(s) Oral Mucosa every 12 hours  chlorhexidine 2% Cloths 1 Application(s) Topical daily  dextrose 5%. 1000 milliLiter(s) (50 mL/Hr) IV Continuous <Continuous>  dextrose 5%. 1000 milliLiter(s) (100 mL/Hr) IV Continuous <Continuous>  dextrose 50% Injectable 12.5 Gram(s) IV Push once  dextrose 50% Injectable 25 Gram(s) IV Push once  dextrose 50% Injectable 25 Gram(s) IV Push once  epoetin odalis (EPOGEN) Injectable 70535 Unit(s) IV Push <User Schedule>  ferrous    sulfate Liquid 300 milliGRAM(s) Oral daily  glucagon  Injectable 1 milliGRAM(s) IntraMuscular once  hydrocortisone sodium succinate Injectable 25 milliGRAM(s) IV Push daily  insulin lispro (ADMELOG) corrective regimen sliding scale   SubCutaneous every 6 hours  insulin NPH human recombinant 11 Unit(s) SubCutaneous every 6 hours  midodrine. 30 milliGRAM(s) Oral every 8 hours  pantoprazole  Injectable 40 milliGRAM(s) IV Push two times a day  petrolatum Ophthalmic Ointment 1 Application(s) Both EYES two times a day  sodium chloride 2 Gram(s) Oral two times a day  sodium chloride 3%  Inhalation 4 milliLiter(s) Inhalation every 6 hours  valACYclovir 500 milliGRAM(s) Oral every 24 hours    MEDICATIONS  (PRN):  acetaminophen   Oral Liquid .. 650 milliGRAM(s) Oral every 6 hours PRN Temp greater or equal to 38C (100.4F), Mild Pain (1 - 3)  calamine/zinc oxide Lotion 1 Application(s) Topical two times a day PRN Rash and/or Itching  dextrose Oral Gel 15 Gram(s) Oral once PRN Blood Glucose LESS THAN 70 milliGRAM(s)/deciliter  sodium chloride 0.9% lock flush 10 milliLiter(s) IV Push every 1 hour PRN Pre/post blood products, medications, blood draw, and to maintain line patency      LABS:                        7.7    11.17 )-----------( 348      ( 03 Oct 2023 02:49 )             23.5     10-03    138  |  99  |  40<H>  ----------------------------<  141<H>  3.6   |  27  |  1.41<H>    Ca    8.4      03 Oct 2023 02:49  Phos  1.6     10-03  Mg     1.90     10-03        Urinalysis Basic - ( 03 Oct 2023 02:49 )    Color: x / Appearance: x / SG: x / pH: x  Gluc: 141 mg/dL / Ketone: x  / Bili: x / Urobili: x   Blood: x / Protein: x / Nitrite: x   Leuk Esterase: x / RBC: x / WBC x   Sq Epi: x / Non Sq Epi: x / Bacteria: x      Arterial Blood Gas:  10-03 @ 02:49  7.43/44/101/29/98.5/4.4  ABG lactate: --     CHIEF COMPLAINT:Patient is a 73y old  Female who presents with a chief complaint of Respiratory distress (02 Oct 2023 19:04)      INTERVAL EVENTS: temp overnight (101.5f tmax). Noted with rash to Zosyn, abx switched to Aztreonam today. Family reporting decline in MS. Ammonia level normal. CTH ordered. L-ear with thick white discharge in [place], ENT asked to reeval.    ROS: Unable to obtain ROS given patient is nonverbal in s/o poor mentation.     OBJECTIVE:  ICU Vital Signs Last 24 Hrs  T(C): 37.4 (03 Oct 2023 07:00), Max: 38.6 (02 Oct 2023 23:40)  T(F): 99.3 (03 Oct 2023 07:00), Max: 101.5 (02 Oct 2023 23:40)  HR: 104 (03 Oct 2023 07:00) (55 - 126)  BP: 117/46 (03 Oct 2023 07:00) (99/39 - 124/44)  BP(mean): --  ABP: --  ABP(mean): --  RR: 22 (03 Oct 2023 07:00) (18 - 22)  SpO2: 99% (03 Oct 2023 07:00) (95% - 100%)    O2 Parameters below as of 03 Oct 2023 07:00  Patient On (Oxygen Delivery Method): ventilator, AC    O2 Concentration (%): 40      Mode: AC/ CMV (Assist Control/ Continuous Mandatory Ventilation), RR (machine): 22, TV (machine): 340, FiO2: 40, PEEP: 8, ITime: 0.8, MAP: 16, PIP: 42    10-02 @ 07:01  -  10-03 @ 07:00  --------------------------------------------------------  IN: 1360 mL / OUT: 2400 mL / NET: -1040 mL      CAPILLARY BLOOD GLUCOSE      POCT Blood Glucose.: 148 mg/dL (03 Oct 2023 05:59)      PHYSICAL EXAM:  General: NAD   HEENT: (+)NGT in nare.  Neck: (+) Trach tube noted, site c/d/i.  Cards: S1/S2, no murmurs   Pulm: CTA bilaterally. No wheezes.   Abdomen: Soft, (+)significant tense abd wall edema.  Extremities: Extremities warm to touch.  Neurology: Somnolent but rouses somewhat to tactile stimuli. Nonverbal. Not following commands.   Skin: warm to touch, color appropriate for ethnicity. Refer to RN assessment for further details.    Mode: AC/ CMV (Assist Control/ Continuous Mandatory Ventilation)  RR (machine): 22  TV (machine): 340  FiO2: 40  PEEP: 8  ITime: 0.8  MAP: 16  PIP: 42      HOSPITAL MEDICATIONS:  MEDICATIONS  (STANDING):  acetic acid 0.25% Topical Irrigation 1 Application(s) Topical two times a day  albuterol/ipratropium for Nebulization 3 milliLiter(s) Nebulizer every 6 hours  apixaban 10 milliGRAM(s) Oral every 12 hours  artificial tears (preservative free) Ophthalmic Solution 1 Drop(s) Both EYES every 4 hours  aspirin  chewable 81 milliGRAM(s) Enteral Tube daily  atorvastatin 10 milliGRAM(s) Oral at bedtime  chlorhexidine 0.12% Liquid 15 milliLiter(s) Oral Mucosa every 12 hours  chlorhexidine 2% Cloths 1 Application(s) Topical daily  dextrose 5%. 1000 milliLiter(s) (50 mL/Hr) IV Continuous <Continuous>  dextrose 5%. 1000 milliLiter(s) (100 mL/Hr) IV Continuous <Continuous>  dextrose 50% Injectable 12.5 Gram(s) IV Push once  dextrose 50% Injectable 25 Gram(s) IV Push once  dextrose 50% Injectable 25 Gram(s) IV Push once  epoetin odalis (EPOGEN) Injectable 54937 Unit(s) IV Push <User Schedule>  ferrous    sulfate Liquid 300 milliGRAM(s) Oral daily  glucagon  Injectable 1 milliGRAM(s) IntraMuscular once  hydrocortisone sodium succinate Injectable 25 milliGRAM(s) IV Push daily  insulin lispro (ADMELOG) corrective regimen sliding scale   SubCutaneous every 6 hours  insulin NPH human recombinant 11 Unit(s) SubCutaneous every 6 hours  midodrine. 30 milliGRAM(s) Oral every 8 hours  pantoprazole  Injectable 40 milliGRAM(s) IV Push two times a day  petrolatum Ophthalmic Ointment 1 Application(s) Both EYES two times a day  sodium chloride 2 Gram(s) Oral two times a day  sodium chloride 3%  Inhalation 4 milliLiter(s) Inhalation every 6 hours  valACYclovir 500 milliGRAM(s) Oral every 24 hours    MEDICATIONS  (PRN):  acetaminophen   Oral Liquid .. 650 milliGRAM(s) Oral every 6 hours PRN Temp greater or equal to 38C (100.4F), Mild Pain (1 - 3)  calamine/zinc oxide Lotion 1 Application(s) Topical two times a day PRN Rash and/or Itching  dextrose Oral Gel 15 Gram(s) Oral once PRN Blood Glucose LESS THAN 70 milliGRAM(s)/deciliter  sodium chloride 0.9% lock flush 10 milliLiter(s) IV Push every 1 hour PRN Pre/post blood products, medications, blood draw, and to maintain line patency      LABS:                        7.7    11.17 )-----------( 348      ( 03 Oct 2023 02:49 )             23.5     10-03    138  |  99  |  40<H>  ----------------------------<  141<H>  3.6   |  27  |  1.41<H>    Ca    8.4      03 Oct 2023 02:49  Phos  1.6     10-03  Mg     1.90     10-03        Urinalysis Basic - ( 03 Oct 2023 02:49 )    Color: x / Appearance: x / SG: x / pH: x  Gluc: 141 mg/dL / Ketone: x  / Bili: x / Urobili: x   Blood: x / Protein: x / Nitrite: x   Leuk Esterase: x / RBC: x / WBC x   Sq Epi: x / Non Sq Epi: x / Bacteria: x      Arterial Blood Gas:  10-03 @ 02:49  7.43/44/101/29/98.5/4.4  ABG lactate: --

## 2023-10-03 NOTE — PROGRESS NOTE ADULT - SUBJECTIVE AND OBJECTIVE BOX
Patient is a 73y old  Female who presents with a chief complaint of Respiratory distress (03 Oct 2023 15:51)      SUBJECTIVE / OVERNIGHT EVENTS: ptn got 2 gm dose of AZACTAM today at 6 pm, ptn seen at 8 pm, has facial swelling and erythema, no stridor, ID made aware. Daughter and  at bedside state the allergic rash post ZOsyn had improved after DC zosyn. last dose today at 2 pm, prior to that 11 am.   may need to switch to Amikacin if reaction will cont on Azactam. rpt Head ct tonight to r/o new CVA. HD as per renal    MEDICATIONS  (STANDING):  acetic acid 0.25% Topical Irrigation 1 Application(s) Topical two times a day  albuterol/ipratropium for Nebulization 3 milliLiter(s) Nebulizer every 6 hours  apixaban 10 milliGRAM(s) Oral every 12 hours  artificial tears (preservative free) Ophthalmic Solution 1 Drop(s) Both EYES every 4 hours  aspirin  chewable 81 milliGRAM(s) Enteral Tube daily  atorvastatin 10 milliGRAM(s) Oral at bedtime  chlorhexidine 0.12% Liquid 15 milliLiter(s) Oral Mucosa every 12 hours  chlorhexidine 2% Cloths 1 Application(s) Topical daily  dextrose 5%. 1000 milliLiter(s) (50 mL/Hr) IV Continuous <Continuous>  dextrose 5%. 1000 milliLiter(s) (100 mL/Hr) IV Continuous <Continuous>  dextrose 50% Injectable 12.5 Gram(s) IV Push once  dextrose 50% Injectable 25 Gram(s) IV Push once  dextrose 50% Injectable 25 Gram(s) IV Push once  diphenhydrAMINE 25 milliGRAM(s) Oral once  epoetin odalis (EPOGEN) Injectable 48319 Unit(s) IV Push <User Schedule>  ferrous    sulfate Liquid 300 milliGRAM(s) Oral daily  glucagon  Injectable 1 milliGRAM(s) IntraMuscular once  hydrocortisone sodium succinate Injectable 25 milliGRAM(s) IV Push daily  insulin lispro (ADMELOG) corrective regimen sliding scale   SubCutaneous every 6 hours  insulin NPH human recombinant 11 Unit(s) SubCutaneous every 6 hours  midodrine. 30 milliGRAM(s) Oral every 8 hours  pantoprazole  Injectable 40 milliGRAM(s) IV Push two times a day  petrolatum Ophthalmic Ointment 1 Application(s) Both EYES two times a day  sodium chloride 2 Gram(s) Oral two times a day  sodium chloride 3%  Inhalation 4 milliLiter(s) Inhalation every 6 hours  valACYclovir 500 milliGRAM(s) Oral every 24 hours    MEDICATIONS  (PRN):  acetaminophen   Oral Liquid .. 650 milliGRAM(s) Oral every 6 hours PRN Temp greater or equal to 38C (100.4F), Mild Pain (1 - 3)  calamine/zinc oxide Lotion 1 Application(s) Topical two times a day PRN Rash and/or Itching  dextrose Oral Gel 15 Gram(s) Oral once PRN Blood Glucose LESS THAN 70 milliGRAM(s)/deciliter  sodium chloride 0.9% lock flush 10 milliLiter(s) IV Push every 1 hour PRN Pre/post blood products, medications, blood draw, and to maintain line patency      Vital Signs Last 24 Hrs  T(F): 99.9 (10-03-23 @ 21:10), Max: 101.5 (10-02-23 @ 23:40)  HR: 121 (10-03-23 @ 21:10) (92 - 126)  BP: 128/41 (10-03-23 @ 21:10) (99/39 - 128/41)  RR: 22 (10-03-23 @ 21:10) (20 - 22)  SpO2: 99% (10-03-23 @ 21:10) (95% - 100%)  Telemetry:   CAPILLARY BLOOD GLUCOSE      POCT Blood Glucose.: 157 mg/dL (03 Oct 2023 17:19)  POCT Blood Glucose.: 177 mg/dL (03 Oct 2023 11:21)  POCT Blood Glucose.: 148 mg/dL (03 Oct 2023 05:59)  POCT Blood Glucose.: 170 mg/dL (02 Oct 2023 23:41)    I&O's Summary    02 Oct 2023 07:01  -  03 Oct 2023 07:00  --------------------------------------------------------  IN: 1360 mL / OUT: 2400 mL / NET: -1040 mL    03 Oct 2023 07:01  -  03 Oct 2023 21:56  --------------------------------------------------------  IN: 480 mL / OUT: 0 mL / NET: 480 mL        PHYSICAL EXAM:  GENERAL: NAD, well-developed  HEAD:  Atraumatic, Normocephalic  EYES: EOMI, PERRLA, conjunctiva and sclera clear  NECK: Supple, No JVD  CHEST/LUNG: Clear to auscultation bilaterally; No wheeze  HEART: Regular rate and rhythm; No murmurs, rubs, or gallops  ABDOMEN: Soft, Nontender, Nondistended; Bowel sounds present  EXTREMITIES:  2+ Peripheral Pulses, No clubbing, cyanosis, or edema  PSYCH: AAOx3  NEUROLOGY: non-focal  SKIN: No rashes or lesions    LABS:                        9.5    14.21 )-----------( 373      ( 03 Oct 2023 12:53 )             29.1     10-03    138  |  99  |  40<H>  ----------------------------<  141<H>  3.6   |  27  |  1.41<H>    Ca    8.4      03 Oct 2023 02:49  Phos  1.6     10-03  Mg     1.90     10-03            Urinalysis Basic - ( 03 Oct 2023 02:49 )    Color: x / Appearance: x / SG: x / pH: x  Gluc: 141 mg/dL / Ketone: x  / Bili: x / Urobili: x   Blood: x / Protein: x / Nitrite: x   Leuk Esterase: x / RBC: x / WBC x   Sq Epi: x / Non Sq Epi: x / Bacteria: x        RADIOLOGY & ADDITIONAL TESTS:    Imaging Personally Reviewed:    Consultant(s) Notes Reviewed:      Care Discussed with Consultants/Other Providers:

## 2023-10-03 NOTE — PROGRESS NOTE ADULT - NS ATTEND AMEND GEN_ALL_CORE FT
Discussed with patient and  at bedside. Patient remains minimally responsive. Encephalopathy likely due to acute illnes + RUSS + sepsis + CVA's (MICU course complicated by new R cerebellar, midbrain, and multiple tiny embolic infarcts as well as known left PCA CVA). Repeat CT head today. Family informed me that prior to current hospitalization her functional status was very limited, wheelchair-bound with bed sores and limited mobility, so I am concerned regarding her overall prognosis given her decline during current hospitalization. Check ammonia level in AM. Continue dialysis with fluid removal per renal. S/p 1/2 unit PRBC's yesterday and today for anemia. Epoetin per nephrology. She had another fever overnight in the setting of drug reaction to Zosyn. She is intolerance of penicillins and cephalosporins. Change to Aztreonam per ID for coverage of  Pseudomonas and Proteus. Check repeat sputum culture. ENT follow-up for left ear otitis externa. Follow-up ophthalmology regarding keratitis + chemosis, on Lacrilube and artificial tears. HD remains stable currently, on midodrine 30 mg q8h. On apixaban for DVT. Overall prognosis guarded, remains full code. Palliative care evaluation. Discussed with  at bedside.

## 2023-10-03 NOTE — PROGRESS NOTE ADULT - SUBJECTIVE AND OBJECTIVE BOX
Follow Up:  MSSA bacteremia    Interval History: pt again febrile overnight with increasing WBC and eos and a new rash    ROS:    Unobtainable because: trached        Allergies  isoniazid (Rash)  nafcillin (Unknown)  hydrALAZINE (Rash)  vitamin E (Short breath; Urticaria; Hives)  doxycycline (Rash)  cefepime (Rash)  NIFEdipine (Urticaria; Hives)        ANTIMICROBIALS:  aztreonam  IVPB    valACYclovir 500 every 24 hours      OTHER MEDS:  acetaminophen   Oral Liquid .. 650 milliGRAM(s) Oral every 6 hours PRN  acetic acid 0.25% Topical Irrigation 1 Application(s) Topical two times a day  albuterol/ipratropium for Nebulization 3 milliLiter(s) Nebulizer every 6 hours  apixaban 10 milliGRAM(s) Oral every 12 hours  artificial tears (preservative free) Ophthalmic Solution 1 Drop(s) Both EYES every 4 hours  aspirin  chewable 81 milliGRAM(s) Enteral Tube daily  atorvastatin 10 milliGRAM(s) Oral at bedtime  calamine/zinc oxide Lotion 1 Application(s) Topical two times a day PRN  chlorhexidine 0.12% Liquid 15 milliLiter(s) Oral Mucosa every 12 hours  chlorhexidine 2% Cloths 1 Application(s) Topical daily  dextrose 5%. 1000 milliLiter(s) IV Continuous <Continuous>  dextrose 5%. 1000 milliLiter(s) IV Continuous <Continuous>  dextrose 50% Injectable 12.5 Gram(s) IV Push once  dextrose 50% Injectable 25 Gram(s) IV Push once  dextrose 50% Injectable 25 Gram(s) IV Push once  dextrose Oral Gel 15 Gram(s) Oral once PRN  epoetin odalis (EPOGEN) Injectable 54066 Unit(s) IV Push <User Schedule>  ferrous    sulfate Liquid 300 milliGRAM(s) Oral daily  glucagon  Injectable 1 milliGRAM(s) IntraMuscular once  hydrocortisone sodium succinate Injectable 25 milliGRAM(s) IV Push daily  insulin lispro (ADMELOG) corrective regimen sliding scale   SubCutaneous every 6 hours  insulin NPH human recombinant 11 Unit(s) SubCutaneous every 6 hours  midodrine. 30 milliGRAM(s) Oral every 8 hours  pantoprazole  Injectable 40 milliGRAM(s) IV Push two times a day  petrolatum Ophthalmic Ointment 1 Application(s) Both EYES two times a day  potassium phosphate / sodium phosphate Powder (PHOS-NaK) 1 Packet(s) Oral once  sodium chloride 2 Gram(s) Oral two times a day  sodium chloride 0.9% lock flush 10 milliLiter(s) IV Push every 1 hour PRN  sodium chloride 3%  Inhalation 4 milliLiter(s) Inhalation every 6 hours      Vital Signs Last 24 Hrs  T(C): 37.6 (03 Oct 2023 09:57), Max: 38.6 (02 Oct 2023 23:40)  T(F): 99.6 (03 Oct 2023 09:57), Max: 101.5 (02 Oct 2023 23:40)  HR: 98 (03 Oct 2023 09:57) (92 - 126)  BP: 117/43 (03 Oct 2023 09:57) (99/39 - 124/44)  BP(mean): --  RR: 22 (03 Oct 2023 09:57) (22 - 22)  SpO2: 97% (03 Oct 2023 09:57) (95% - 100%)    Parameters below as of 03 Oct 2023 09:57  Patient On (Oxygen Delivery Method): ventilator    O2 Concentration (%): 40    Physical Exam:  General:    trached   ENT: L ear with crusted dark drainage  Respiratory:   trach on vent  abd:  distended but soft  :     willard  Musculoskeletal : generalized edema  Skin: new erythematous rash  vascular: TRISHA odom                                9.5    14.21 )-----------( 373      ( 03 Oct 2023 12:53 )             29.1       10-03    138  |  99  |  40<H>  ----------------------------<  141<H>  3.6   |  27  |  1.41<H>    Ca    8.4      03 Oct 2023 02:49  Phos  1.6     10-03  Mg     1.90     10-03        Urinalysis Basic - ( 03 Oct 2023 02:49 )    Color: x / Appearance: x / SG: x / pH: x  Gluc: 141 mg/dL / Ketone: x  / Bili: x / Urobili: x   Blood: x / Protein: x / Nitrite: x   Leuk Esterase: x / RBC: x / WBC x   Sq Epi: x / Non Sq Epi: x / Bacteria: x        MICROBIOLOGY:  v  .Blood Blood-Venous  09-29-23   No growth at 72 Hours  --  --      .Blood Blood-Peripheral  09-29-23   No growth at 72 Hours  --  --      ET Tube ET Tube  09-29-23   Testing in progress  --  --      ET Tube ET Tube  09-29-23   Numerous Proteus mirabilis  Moderate Pseudomonas aeruginosa (Carbapenem Resistant)  Normal Respiratory Cate absent  --  Proteus mirabilis  Pseudomonas aeruginosa (Carbapenem Resistant)      Clean Catch Clean Catch (Midstream)  09-26-23   10,000 - 49,000 CFU/mL Candida albicans "Susceptibilities not performed"  --  --      .Blood Blood-Peripheral  09-26-23   No growth at 5 days  --  --      .Blood Blood-Venous  09-20-23   No growth at 5 days  --  --      .Blood Blood-Peripheral  09-20-23   No growth at 5 days  --  --      Ear Ear  09-13-23   Moderate Candida albicans "Susceptibilities not performed"  --  --      .Blood Blood-Venous  09-10-23   No growth at 5 days  --  --      .Sputum Sputum  09-10-23   Normal Respiratory Cate present  --    Rare polymorphonuclear leukocytes per low power field  No Squamous epithelial cells per low power field  Rare Yeast like cells seen per oil power field  Rare Gram Positive Cocci in Clusters seen per oil power field      .Blood Blood-Peripheral  09-10-23   No growth at 5 days  --  --      .Sputum Sputum  09-08-23   Normal Respiratory Cate present  --    Numerous polymorphonuclear leukocytes per low power field  Numerous Squamous epithelial cells per low power field  Few Yeast like cells per oil power field  Results consistent with oropharyngeal contamination      .Blood Blood-Peripheral  09-08-23   No growth at 5 days  --  --      Ear Ear  09-06-23   No growth at 5 days  --  --      .Blood Blood-Peripheral  09-04-23   No growth at 5 days  --  --          Rapid RVP Result: NotDetec (09-26 @ 16:30)        RADIOLOGY:  Images independently visualized and reviewed personally, findings as below  < from: Xray Chest 1 View- PORTABLE-Urgent (Xray Chest 1 View- PORTABLE-Urgent .) (09.28.23 @ 01:16) >  IMPRESSION:  Bilateral layering effusions, right greater than left, that appears   similar as compared with 9/20/2023.    Perihilar interstitial opacities, likely pulmonary edema.    < end of copied text >  < from: CT Head No Cont (09.26.23 @ 21:02) >  IMPRESSION: Vague questionable areas of hypoattenuation within the   bilateral cerebellar hemispheres which may be compatible with   acute/subacute ischemia. Recommend further evaluation with a brain MRI   study, provided there are no MRI contraindications.    No acute intracranial hemorrhage.    Previously seen questionable vague wedge-shaped area of hypoattenuation   in the left parietal lobe with artifactual secondary to motion and volume   averaging with a prominent sulcus.    Chronic left occipital lobe infarct.    < end of copied text >  < from: TTE W or WO Ultrasound Enhancing Agent (10.01.23 @ 10:07) >  CONCLUSIONS:      1. Left ventricular systolic function is normal with an ejection fraction visually estimated at 60 to 65 %.   2. The right ventricle is not well visualized.   3. Although there is no evidence of endocarditis on this study, the valves are not visualized well. Suggest STEPHANIE if clinically appropriate.    < end of copied text >

## 2023-10-03 NOTE — PROGRESS NOTE ADULT - SUBJECTIVE AND OBJECTIVE BOX
Subjective: Patient seen and examined. No new events except as noted.   s/p prbc transfusion   Tolerated HD yesterday and removed 2L. REVIEW OF SYSTEMS:  Unable to obtain    MEDICATIONS:  MEDICATIONS  (STANDING):  acetic acid 0.25% Topical Irrigation 1 Application(s) Topical two times a day  albuterol/ipratropium for Nebulization 3 milliLiter(s) Nebulizer every 6 hours  apixaban 10 milliGRAM(s) Oral every 12 hours  artificial tears (preservative free) Ophthalmic Solution 1 Drop(s) Both EYES every 4 hours  aspirin  chewable 81 milliGRAM(s) Enteral Tube daily  atorvastatin 10 milliGRAM(s) Oral at bedtime  chlorhexidine 0.12% Liquid 15 milliLiter(s) Oral Mucosa every 12 hours  chlorhexidine 2% Cloths 1 Application(s) Topical daily  dextrose 5%. 1000 milliLiter(s) (50 mL/Hr) IV Continuous <Continuous>  dextrose 5%. 1000 milliLiter(s) (100 mL/Hr) IV Continuous <Continuous>  dextrose 50% Injectable 12.5 Gram(s) IV Push once  dextrose 50% Injectable 25 Gram(s) IV Push once  dextrose 50% Injectable 25 Gram(s) IV Push once  epoetin odalis (EPOGEN) Injectable 71446 Unit(s) IV Push <User Schedule>  ferrous    sulfate Liquid 300 milliGRAM(s) Oral daily  glucagon  Injectable 1 milliGRAM(s) IntraMuscular once  hydrocortisone sodium succinate Injectable 25 milliGRAM(s) IV Push daily  insulin lispro (ADMELOG) corrective regimen sliding scale   SubCutaneous every 6 hours  insulin NPH human recombinant 11 Unit(s) SubCutaneous every 6 hours  midodrine. 30 milliGRAM(s) Oral every 8 hours  pantoprazole  Injectable 40 milliGRAM(s) IV Push two times a day  petrolatum Ophthalmic Ointment 1 Application(s) Both EYES two times a day  potassium phosphate / sodium phosphate Powder (PHOS-NaK) 1 Packet(s) Oral once  sodium chloride 2 Gram(s) Oral two times a day  sodium chloride 3%  Inhalation 4 milliLiter(s) Inhalation every 6 hours  valACYclovir 500 milliGRAM(s) Oral every 24 hours      PHYSICAL EXAM:  T(C): 37.6 (10-03-23 @ 09:57), Max: 38.6 (10-02-23 @ 23:40)  HR: 98 (10-03-23 @ 09:57) (92 - 126)  BP: 117/43 (10-03-23 @ 09:57) (99/39 - 124/44)  RR: 22 (10-03-23 @ 09:57) (22 - 22)  SpO2: 97% (10-03-23 @ 09:57) (95% - 100%)  Wt(kg): --  I&O's Summary    02 Oct 2023 07:01  -  03 Oct 2023 07:00  --------------------------------------------------------  IN: 1360 mL / OUT: 2400 mL / NET: -1040 mL            Appearance: NAD, +trach  HEENT: dry oral mucosa  Lymphatic: No lymphadenopathy  Cardiovascular: Normal S1 S2, No JVD, No murmurs, No edema  Respiratory: Decreased BS, + trach to vent	  Neuro: opens eyes  Gastrointestinal: Soft, Non-tender, + BS, + NGT  Skin: No rashes, No ecchymoses, No cyanosis	  Extremities: No strength/ROM 2/2 sedation, + BL LE edema  Vascular: Peripheral pulses palpable 2+ bilaterally  Mode: AC/ CMV (Assist Control/ Continuous Mandatory Ventilation), RR (machine): 22, TV (machine): 340, FiO2: 35, PEEP: 8, ITime: 0.81, MAP: 20, PIP: 55          LABS:    CARDIAC MARKERS:                                9.5    14.21 )-----------( 373      ( 03 Oct 2023 12:53 )             29.1     10-03    138  |  99  |  40<H>  ----------------------------<  141<H>  3.6   |  27  |  1.41<H>    Ca    8.4      03 Oct 2023 02:49  Phos  1.6     10-03  Mg     1.90     10-03      proBNP:   Lipid Profile:   HgA1c:   TSH:             TELEMETRY: 	    ECG:  	  RADIOLOGY:   DIAGNOSTIC TESTING:  [ ] Echocardiogram:  [ ]  Catheterization:  [ ] Stress Test:    OTHER:

## 2023-10-03 NOTE — PROGRESS NOTE ADULT - SUBJECTIVE AND OBJECTIVE BOX
SUBJECTIVE:  ENT re-evaluation of ear. Patient remains on vent support via trach. Family member at bedside reports her left ear has been accumulating debris within the external ear. States that she has chronic hearing loss on that side prior to current hospitalization. Patient currently getting acetic acid ear drops BID to left ear. 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 Started on zosyn 10/1 then switched to aztreonam 10/2 for pseudomonal pneumonia.     ALLERGIES  isoniazid (Rash)  nafcillin (Unknown)  hydrALAZINE (Rash)  vitamin E (Short breath; Urticaria; Hives)  doxycycline (Rash)  cefepime (Rash)  NIFEdipine (Urticaria; Hives)    MEDICATIONS  (STANDING):  acetic acid 0.25% Topical Irrigation 1 Application(s) Topical two times a day  albuterol/ipratropium for Nebulization 3 milliLiter(s) Nebulizer every 6 hours  apixaban 10 milliGRAM(s) Oral every 12 hours  artificial tears (preservative free) Ophthalmic Solution 1 Drop(s) Both EYES every 4 hours  aspirin  chewable 81 milliGRAM(s) Enteral Tube daily  atorvastatin 10 milliGRAM(s) Oral at bedtime  chlorhexidine 0.12% Liquid 15 milliLiter(s) Oral Mucosa every 12 hours  chlorhexidine 2% Cloths 1 Application(s) Topical daily  dextrose 5%. 1000 milliLiter(s) (100 mL/Hr) IV Continuous <Continuous>  dextrose 5%. 1000 milliLiter(s) (50 mL/Hr) IV Continuous <Continuous>  dextrose 50% Injectable 12.5 Gram(s) IV Push once  dextrose 50% Injectable 25 Gram(s) IV Push once  dextrose 50% Injectable 25 Gram(s) IV Push once  diphenhydrAMINE 25 milliGRAM(s) Oral once  epoetin odalis (EPOGEN) Injectable 65505 Unit(s) IV Push <User Schedule>  ferrous    sulfate Liquid 300 milliGRAM(s) Oral daily  glucagon  Injectable 1 milliGRAM(s) IntraMuscular once  hydrocortisone sodium succinate Injectable 25 milliGRAM(s) IV Push daily  insulin lispro (ADMELOG) corrective regimen sliding scale   SubCutaneous every 6 hours  insulin NPH human recombinant 11 Unit(s) SubCutaneous every 6 hours  midodrine. 30 milliGRAM(s) Oral every 8 hours  pantoprazole  Injectable 40 milliGRAM(s) IV Push two times a day  petrolatum Ophthalmic Ointment 1 Application(s) Both EYES two times a day  sodium chloride 2 Gram(s) Oral two times a day  sodium chloride 3%  Inhalation 4 milliLiter(s) Inhalation every 6 hours  valACYclovir 500 milliGRAM(s) Oral every 24 hours    MEDICATIONS  (PRN):  acetaminophen   Oral Liquid .. 650 milliGRAM(s) Oral every 6 hours PRN Temp greater or equal to 38C (100.4F), Mild Pain (1 - 3)  calamine/zinc oxide Lotion 1 Application(s) Topical two times a day PRN Rash and/or Itching  dextrose Oral Gel 15 Gram(s) Oral once PRN Blood Glucose LESS THAN 70 milliGRAM(s)/deciliter  sodium chloride 0.9% lock flush 10 milliLiter(s) IV Push every 1 hour PRN Pre/post blood products, medications, blood draw, and to maintain line patency    LABS                         9.5    14.21 )-----------( 373      ( 03 Oct 2023 12:53 )             29.1    10-03    138  |  99  |  40<H>  ----------------------------<  141<H>  3.6   |  27  |  1.41<H>    Ca    8.4      03 Oct 2023 02:49  Phos  1.6     10-03  Mg     1.90     10-03    INs & OUTs  Drains:     VITAL SIGNS:  ICU Vital Signs Last 24 Hrs  T(C): 37.7 (03 Oct 2023 21:10), Max: 38.6 (02 Oct 2023 23:40)  T(F): 99.9 (03 Oct 2023 21:10), Max: 101.5 (02 Oct 2023 23:40)  HR: 121 (03 Oct 2023 21:10) (92 - 126)  BP: 128/41 (03 Oct 2023 21:10) (99/39 - 128/41)  BP(mean): --  ABP: --  ABP(mean): --  RR: 22 (03 Oct 2023 21:10) (20 - 22)  SpO2: 99% (03 Oct 2023 21:10) (95% - 100%)    O2 Parameters below as of 03 Oct 2023 21:10  Patient On (Oxygen Delivery Method): ventilator, AC    O2 Concentration (%): 40    PE:  General: trach on vent, general edema, poor mental status   AD: external ear normal  AS: white foam and crusted debris removed from sakina revealing thickened and darkened underlying skin. Waxy debris removed from EAC with suction, curette and alligator. Posterior TM difficult to assess glossy in appearance but no fluid to suction - either firm granulation tissue or posterior TM perforation in the 2-5 o'clock position. Visualized TM sclerotic and opaque. Posterior ear skin normal. Mastoid firm, no warmth or fluctuance.     A/P:  72 y/o F with PMH of DM, CVA, HTN admitted for respiratory failure s/p tracheostomy on 9/1/2023 and bacteremia. ENT was consulted for L OE 9/5. Requiring serial debridements, wick removed 9/13/2023, conchal bowl with some discoloration, and external ear canal with black spores and white spots this morning. Debridement was done at bedside with suction. TM was difficult to examine with debridement. Culture from 9/6/2023 with no growth. Patient was started on heparin drip for acute left lower popliteal DVT on 9/11/2023 by primary team. 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 Superimposed left-sided tympanosclerosis. Superimposed cholesteatoma formation within the left tympanomastoid cavity cannot be excluded. No evidence of malignant otitis externa.  + MRSA on mupirocin. ENT saw on 9/20 for white purulent discharge from left ear and acetic acid and mupirocin were given. Patient is currently on meropenem and vancomycin per ID. Fluconazole was added as well on 9/21.       SUBJECTIVE:  ENT re-evaluation of ear. Patient remains on vent support via trach. Family member at bedside reports her left ear has been accumulating debris within the external ear. States that she has chronic hearing loss on that side prior to current hospitalization. Patient currently getting acetic acid ear drops BID to left ear. 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 Started on zosyn 10/1 then switched to aztreonam 10/2 for pseudomonal pneumonia.     ALLERGIES  isoniazid (Rash)  nafcillin (Unknown)  hydrALAZINE (Rash)  vitamin E (Short breath; Urticaria; Hives)  doxycycline (Rash)  cefepime (Rash)  NIFEdipine (Urticaria; Hives)    MEDICATIONS  (STANDING):  acetic acid 0.25% Topical Irrigation 1 Application(s) Topical two times a day  albuterol/ipratropium for Nebulization 3 milliLiter(s) Nebulizer every 6 hours  apixaban 10 milliGRAM(s) Oral every 12 hours  artificial tears (preservative free) Ophthalmic Solution 1 Drop(s) Both EYES every 4 hours  aspirin  chewable 81 milliGRAM(s) Enteral Tube daily  atorvastatin 10 milliGRAM(s) Oral at bedtime  chlorhexidine 0.12% Liquid 15 milliLiter(s) Oral Mucosa every 12 hours  chlorhexidine 2% Cloths 1 Application(s) Topical daily  dextrose 5%. 1000 milliLiter(s) (100 mL/Hr) IV Continuous <Continuous>  dextrose 5%. 1000 milliLiter(s) (50 mL/Hr) IV Continuous <Continuous>  dextrose 50% Injectable 12.5 Gram(s) IV Push once  dextrose 50% Injectable 25 Gram(s) IV Push once  dextrose 50% Injectable 25 Gram(s) IV Push once  diphenhydrAMINE 25 milliGRAM(s) Oral once  epoetin odalis (EPOGEN) Injectable 16575 Unit(s) IV Push <User Schedule>  ferrous    sulfate Liquid 300 milliGRAM(s) Oral daily  glucagon  Injectable 1 milliGRAM(s) IntraMuscular once  hydrocortisone sodium succinate Injectable 25 milliGRAM(s) IV Push daily  insulin lispro (ADMELOG) corrective regimen sliding scale   SubCutaneous every 6 hours  insulin NPH human recombinant 11 Unit(s) SubCutaneous every 6 hours  midodrine. 30 milliGRAM(s) Oral every 8 hours  pantoprazole  Injectable 40 milliGRAM(s) IV Push two times a day  petrolatum Ophthalmic Ointment 1 Application(s) Both EYES two times a day  sodium chloride 2 Gram(s) Oral two times a day  sodium chloride 3%  Inhalation 4 milliLiter(s) Inhalation every 6 hours  valACYclovir 500 milliGRAM(s) Oral every 24 hours    MEDICATIONS  (PRN):  acetaminophen   Oral Liquid .. 650 milliGRAM(s) Oral every 6 hours PRN Temp greater or equal to 38C (100.4F), Mild Pain (1 - 3)  calamine/zinc oxide Lotion 1 Application(s) Topical two times a day PRN Rash and/or Itching  dextrose Oral Gel 15 Gram(s) Oral once PRN Blood Glucose LESS THAN 70 milliGRAM(s)/deciliter  sodium chloride 0.9% lock flush 10 milliLiter(s) IV Push every 1 hour PRN Pre/post blood products, medications, blood draw, and to maintain line patency    LABS                         9.5    14.21 )-----------( 373      ( 03 Oct 2023 12:53 )             29.1    10-03    138  |  99  |  40<H>  ----------------------------<  141<H>  3.6   |  27  |  1.41<H>    Ca    8.4      03 Oct 2023 02:49  Phos  1.6     10-03  Mg     1.90     10-03    INs & OUTs  Drains:     VITAL SIGNS:  ICU Vital Signs Last 24 Hrs  T(C): 37.7 (03 Oct 2023 21:10), Max: 38.6 (02 Oct 2023 23:40)  T(F): 99.9 (03 Oct 2023 21:10), Max: 101.5 (02 Oct 2023 23:40)  HR: 121 (03 Oct 2023 21:10) (92 - 126)  BP: 128/41 (03 Oct 2023 21:10) (99/39 - 128/41)  BP(mean): --  ABP: --  ABP(mean): --  RR: 22 (03 Oct 2023 21:10) (20 - 22)  SpO2: 99% (03 Oct 2023 21:10) (95% - 100%)    O2 Parameters below as of 03 Oct 2023 21:10  Patient On (Oxygen Delivery Method): ventilator, AC    O2 Concentration (%): 40    PE:  General: trach on vent, general edema, poor mental status   AD: external ear normal  AS: white foam and crusted debris removed from sakina revealing thickened and darkened underlying skin. Waxy debris removed from EAC with suction, curette and alligator. Posterior TM difficult to assess glossy in appearance but no fluid to suction - either firm granulation tissue or posterior TM perforation in the 2-5 o'clock position. Visualized TM sclerotic and opaque. Posterior ear skin normal. Mastoid firm, no warmth or fluctuance.     A/P:  74 y/o F with PMH of DM, CVA, HTN admitted for respiratory failure s/p tracheostomy on 9/1/2023 and bacteremia. ENT was consulted for L OE 9/5.  - s/p ear wick/ciprodex gtt/serial debridements 9/5-9/16,   - s/p CT IAC 9/12 showing "acute on chronic left-sided otitis externa and acute on chronic left-sided otomastoiditis, superimposed left-sided tympanosclerosis, superimposed cholesteatoma formation within the left tympanomastoid cavity cannot be excluded, no evidence of malignant otitis externa"   - s/p mupirocin ointment 9/13-10/1, acetic acid drops 9/20-present  - s/p fluconazole 9/21-9/28 for possible fungal spores in L EAC and L ear culture with candida  This patient has a chronically draining left ear which may be in remission at this moment. Difficult to assess due to debris that has accumulated - dried ear drops and ointment and dried crust from prior discharge, and wax that has accumulated in the canal. The patient also has chronic middle ear and mastoid effusions bilaterally.     Recommendations  - Please discontinue acetic acid drops and mupirocin ointment to left ear  - If more fluid accumulates naturally in left ear, send another ear culture  - ENT has previously discussed this patient with otologist with the recommendation that no ENT surgical intervention is indicated at this time. The patient will require outpatient follow up with Dr. Feng and likely repeat CT IAC imaging or MRI to better characterize the soft tissue densities in the middle ear and mastoid. No suspicion of an acute mastoiditis or malignant otitis externa at this time  - Continue tight glucose control  - Appreciate ID recs       SUBJECTIVE:  ENT re-evaluation of ear. Patient remains on vent support via trach. Family member at bedside reports her left ear has been accumulating debris within the external ear. States that she has chronic hearing loss on that side prior to current hospitalization. Patient currently getting acetic acid ear drops BID to left ear. 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 Started on zosyn 10/1 then switched to aztreonam 10/2 for pseudomonal pneumonia.     ALLERGIES  isoniazid (Rash)  nafcillin (Unknown)  hydrALAZINE (Rash)  vitamin E (Short breath; Urticaria; Hives)  doxycycline (Rash)  cefepime (Rash)  NIFEdipine (Urticaria; Hives)    MEDICATIONS  (STANDING):  acetic acid 0.25% Topical Irrigation 1 Application(s) Topical two times a day  albuterol/ipratropium for Nebulization 3 milliLiter(s) Nebulizer every 6 hours  apixaban 10 milliGRAM(s) Oral every 12 hours  artificial tears (preservative free) Ophthalmic Solution 1 Drop(s) Both EYES every 4 hours  aspirin  chewable 81 milliGRAM(s) Enteral Tube daily  atorvastatin 10 milliGRAM(s) Oral at bedtime  chlorhexidine 0.12% Liquid 15 milliLiter(s) Oral Mucosa every 12 hours  chlorhexidine 2% Cloths 1 Application(s) Topical daily  dextrose 5%. 1000 milliLiter(s) (100 mL/Hr) IV Continuous <Continuous>  dextrose 5%. 1000 milliLiter(s) (50 mL/Hr) IV Continuous <Continuous>  dextrose 50% Injectable 12.5 Gram(s) IV Push once  dextrose 50% Injectable 25 Gram(s) IV Push once  dextrose 50% Injectable 25 Gram(s) IV Push once  diphenhydrAMINE 25 milliGRAM(s) Oral once  epoetin odalis (EPOGEN) Injectable 11815 Unit(s) IV Push <User Schedule>  ferrous    sulfate Liquid 300 milliGRAM(s) Oral daily  glucagon  Injectable 1 milliGRAM(s) IntraMuscular once  hydrocortisone sodium succinate Injectable 25 milliGRAM(s) IV Push daily  insulin lispro (ADMELOG) corrective regimen sliding scale   SubCutaneous every 6 hours  insulin NPH human recombinant 11 Unit(s) SubCutaneous every 6 hours  midodrine. 30 milliGRAM(s) Oral every 8 hours  pantoprazole  Injectable 40 milliGRAM(s) IV Push two times a day  petrolatum Ophthalmic Ointment 1 Application(s) Both EYES two times a day  sodium chloride 2 Gram(s) Oral two times a day  sodium chloride 3%  Inhalation 4 milliLiter(s) Inhalation every 6 hours  valACYclovir 500 milliGRAM(s) Oral every 24 hours    MEDICATIONS  (PRN):  acetaminophen   Oral Liquid .. 650 milliGRAM(s) Oral every 6 hours PRN Temp greater or equal to 38C (100.4F), Mild Pain (1 - 3)  calamine/zinc oxide Lotion 1 Application(s) Topical two times a day PRN Rash and/or Itching  dextrose Oral Gel 15 Gram(s) Oral once PRN Blood Glucose LESS THAN 70 milliGRAM(s)/deciliter  sodium chloride 0.9% lock flush 10 milliLiter(s) IV Push every 1 hour PRN Pre/post blood products, medications, blood draw, and to maintain line patency    LABS                         9.5    14.21 )-----------( 373      ( 03 Oct 2023 12:53 )             29.1    10-03    138  |  99  |  40<H>  ----------------------------<  141<H>  3.6   |  27  |  1.41<H>    Ca    8.4      03 Oct 2023 02:49  Phos  1.6     10-03  Mg     1.90     10-03    INs & OUTs  Drains:     VITAL SIGNS:  ICU Vital Signs Last 24 Hrs  T(C): 37.7 (03 Oct 2023 21:10), Max: 38.6 (02 Oct 2023 23:40)  T(F): 99.9 (03 Oct 2023 21:10), Max: 101.5 (02 Oct 2023 23:40)  HR: 121 (03 Oct 2023 21:10) (92 - 126)  BP: 128/41 (03 Oct 2023 21:10) (99/39 - 128/41)  BP(mean): --  ABP: --  ABP(mean): --  RR: 22 (03 Oct 2023 21:10) (20 - 22)  SpO2: 99% (03 Oct 2023 21:10) (95% - 100%)    O2 Parameters below as of 03 Oct 2023 21:10  Patient On (Oxygen Delivery Method): ventilator, AC    O2 Concentration (%): 40    PE:  General: trach on vent, general edema, poor mental status   AD: external ear normal  AS: white film and crusted debris removed from sakina revealing thickened and darkened underlying skin. Waxy debris removed from EAC with suction, curette and alligator. Posterior TM difficult to assess glossy in appearance but no fluid to suction - either firm granulation tissue or posterior TM perforation in the 2-5 o'clock position. Visualized TM sclerotic and opaque. Posterior ear skin normal. Mastoid firm, no warmth or fluctuance.     A/P:  74 y/o F with PMH of DM, CVA, HTN admitted for respiratory failure s/p tracheostomy on 9/1/2023 and bacteremia. ENT was consulted for L OE 9/5.  - s/p ear wick/ciprodex gtt/serial debridements 9/5-9/16,   - s/p CT IAC 9/12 showing "acute on chronic left-sided otitis externa and acute on chronic left-sided otomastoiditis, superimposed left-sided tympanosclerosis, superimposed cholesteatoma formation within the left tympanomastoid cavity cannot be excluded, no evidence of malignant otitis externa"   - s/p mupirocin ointment 9/13-10/1, acetic acid drops 9/20-present  - s/p fluconazole 9/21-9/28 for possible fungal spores in L EAC and L ear culture with candida  This patient has a chronically draining left ear which may be in remission at this moment. Difficult to assess due to debris that has accumulated - dried ear drops and ointment and dried crust from prior discharge, and wax that has accumulated in the canal. The patient also has chronic middle ear and mastoid effusions bilaterally.     Recommendations  - Please discontinue acetic acid drops and mupirocin ointment to left ear  - If more fluid accumulates naturally in left ear, send another ear culture  - ENT has previously discussed this patient with otologist with the recommendation that no ENT surgical intervention is indicated at this time. The patient will require outpatient follow up with Dr. Feng and likely repeat CT IAC imaging or MRI to better characterize the soft tissue densities in the middle ear and mastoid. No suspicion of an acute mastoiditis or malignant otitis externa at this time  - Continue tight glucose control  - Appreciate ID recs

## 2023-10-03 NOTE — PROGRESS NOTE ADULT - ASSESSMENT
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 family declined renal biopsy started on prednisone  s/p trach 9/1 and BAL with MSSA   ET cx with ESBL and MSSA  started on ana cristina 9/1  blood cx 9/1: MSSA   repeat negative 9/4, 9/8  CXR with b/l opacities, R increased  L ear edema and otitis externa, the last cx showed candida and ENT stated it could be fungal (saw black spores?)    initial resp failure for ?fluid ovrer load but also had MSSA in the sputum, got vanco, aztreonam one day then cefepime and had rash, renal failure which was considered due to cefepime and then received 2 days of cefazolin and then off, now with trach and bronc on 9/1 with MSSA bacteremia and BAL also MSSA,   another ET cx with MSSA and ESBL E-coli  hypotension, MSSA bacteremia likely due to pneumonia, repeat blood cx negative 9/4, TTE limited unable to exclude endocarditis, s/p a 4 week course of vanco then dapto through 10/2  L otitis externa on ana cristina since 9/1 but worsening edema and black discoloration with bullae forming and persistent fever (ear cx was negative)  Ct showed acute on chronic otitis externa and otomastoiditis , superimposed cholesteatoma can not be excluded, no malignant otitis externa  pt again febrile, ENT stated there was black spores which could be a fungal infection and the last cx showed candida albicans s/p 7 days of fluconazole   new erythematous patchy rash, did not improved after discontinuing the ana cristina so vanco switched to dapto but still with rash and it also started after pt was started on fluconazole so not sure which caused it  also hypotensive now and ?uveitis vs endophthalmitis, head CT with acute/subacute ischemia and old occipital infarct, chest/abd CT with unchanged  RUL consolidation, decreased BEVERLY consolidation  pt uremic as well, s/p shiley and resumed on HD 9/27  s/p bronc with excessive dynamic airway collapse s/p trach change and some improvement  pt febrile and tachy on 9/29, sputum cx with carbapenem resistant pseudomonas (S to zosyn) and proteus    * ana cristina was switched to zosyn 10/1, again with rash 10/2, fever and increasing WBC eos, stop zosyn and start aztreonam  *  the 4 week course of MSSA bacteremia will end today, discontinue dapto after the today's dose  * monitor CBC/diff and renal function  * f/u with ophthalmology, on valtrex as per ophthalmology     The above assessment and plan was discussed with the primary team    Yumiko Conner MD  contact on teams  After 5pm and on weekends call [phone]

## 2023-10-03 NOTE — PROGRESS NOTE ADULT - SUBJECTIVE AND OBJECTIVE BOX
NEPHROLOGY     Patient seen and examined with family at bedside, trach to paula, noted febrile overnight, in no acute distress. Tolerated HD yesterday and removed 2L.     MEDICATIONS  (STANDING):  acetic acid 0.25% Topical Irrigation 1 Application(s) Topical two times a day  albuterol/ipratropium for Nebulization 3 milliLiter(s) Nebulizer every 6 hours  apixaban 10 milliGRAM(s) Oral every 12 hours  artificial tears (preservative free) Ophthalmic Solution 1 Drop(s) Both EYES every 4 hours  aspirin  chewable 81 milliGRAM(s) Enteral Tube daily  atorvastatin 10 milliGRAM(s) Oral at bedtime  chlorhexidine 0.12% Liquid 15 milliLiter(s) Oral Mucosa every 12 hours  chlorhexidine 2% Cloths 1 Application(s) Topical daily  dextrose 5%. 1000 milliLiter(s) (50 mL/Hr) IV Continuous <Continuous>  dextrose 5%. 1000 milliLiter(s) (100 mL/Hr) IV Continuous <Continuous>  dextrose 50% Injectable 12.5 Gram(s) IV Push once  dextrose 50% Injectable 25 Gram(s) IV Push once  dextrose 50% Injectable 25 Gram(s) IV Push once  epoetin odalis (EPOGEN) Injectable 83470 Unit(s) IV Push <User Schedule>  ferrous    sulfate Liquid 300 milliGRAM(s) Oral daily  glucagon  Injectable 1 milliGRAM(s) IntraMuscular once  hydrocortisone sodium succinate Injectable 25 milliGRAM(s) IV Push daily  insulin lispro (ADMELOG) corrective regimen sliding scale   SubCutaneous every 6 hours  insulin NPH human recombinant 11 Unit(s) SubCutaneous every 6 hours  midodrine. 30 milliGRAM(s) Oral every 8 hours  pantoprazole  Injectable 40 milliGRAM(s) IV Push two times a day  petrolatum Ophthalmic Ointment 1 Application(s) Both EYES two times a day  potassium phosphate / sodium phosphate Powder (PHOS-NaK) 1 Packet(s) Oral once  sodium chloride 2 Gram(s) Oral two times a day  sodium chloride 3%  Inhalation 4 milliLiter(s) Inhalation every 6 hours  valACYclovir 500 milliGRAM(s) Oral every 24 hours    VITALS:  T(C): , Max: 38.6 (10-02-23 @ 23:40)  T(F): , Max: 101.5 (10-02-23 @ 23:40)  HR: 98 (10-03-23 @ 08:23)  BP: 117/46 (10-03-23 @ 07:00)  RR: 22 (10-03-23 @ 07:00)  SpO2: 97% (10-03-23 @ 08:23)    I and O's:    10-02 @ 07:01  -  10-03 @ 07:00  --------------------------------------------------------  IN: 1360 mL / OUT: 2400 mL / NET: -1040 mL    PHYSICAL EXAM:  Constitutional: minimally communicative at present; on vent  HEENT: trach-vent, (+)NGT  Respiratory: coarse BS b/l  Cardiovascular: tachy reg s1s2  Gastrointestinal: BS+, soft, NT/ND  Extremities: + peripheral edema  Neurological: tone WNL  : (+) Wheeler  Skin: No rashes  Access: (+)[facility]    LABS:                        7.7    11.17 )-----------( 348      ( 03 Oct 2023 02:49 )             23.5     10-03    138  |  99  |  40<H>  ----------------------------<  141<H>  3.6   |  27  |  1.41<H>    Ca    8.4      03 Oct 2023 02:49  Phos  1.6     10-03  Mg     1.90     10-03        Urine Studies:  Urinalysis Basic - ( 03 Oct 2023 02:49 )    Color: x / Appearance: x / SG: x / pH: x  Gluc: 141 mg/dL / Ketone: x  / Bili: x / Urobili: x   Blood: x / Protein: x / Nitrite: x   Leuk Esterase: x / RBC: x / WBC x   Sq Epi: x / Non Sq Epi: x / Bacteria: x    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    (2)RUSS - hemodynamic RUSS as of late last week - s/p HD 9/27, 9/28, 9/30 and yesterday    (3)Hyponatremia - improved, with HD; now off salt tabs    (4)Pulm- vent-dependent    (5)ID - s/p Meropenem IV - dosed for low GFR    (6)Anemia - s/p PRBCs overnight, warranting DAVID with HD    (6)CV - tenuous hemodynamics - off pressor gtts/on Midodrine    (7)Hypophosphatemia - being repleted with phosnak this morning     RECOMMEND:  (1)Next HD tomorrow - 3h, 2kg UF as able, pressors as needed to keep SBP>90; Epogen with HD  (2)Dose new meds for GFR 10-15ml/min    Nilda Espinoza DNP  [facility]  [phone]  NEPHROLOGY     Patient seen and examined with family at bedside, trach to paula, noted febrile overnight, in no acute distress. Tolerated HD yesterday and removed 2L.     MEDICATIONS  (STANDING):  acetic acid 0.25% Topical Irrigation 1 Application(s) Topical two times a day  albuterol/ipratropium for Nebulization 3 milliLiter(s) Nebulizer every 6 hours  apixaban 10 milliGRAM(s) Oral every 12 hours  artificial tears (preservative free) Ophthalmic Solution 1 Drop(s) Both EYES every 4 hours  aspirin  chewable 81 milliGRAM(s) Enteral Tube daily  atorvastatin 10 milliGRAM(s) Oral at bedtime  chlorhexidine 0.12% Liquid 15 milliLiter(s) Oral Mucosa every 12 hours  chlorhexidine 2% Cloths 1 Application(s) Topical daily  dextrose 5%. 1000 milliLiter(s) (50 mL/Hr) IV Continuous <Continuous>  dextrose 5%. 1000 milliLiter(s) (100 mL/Hr) IV Continuous <Continuous>  dextrose 50% Injectable 12.5 Gram(s) IV Push once  dextrose 50% Injectable 25 Gram(s) IV Push once  dextrose 50% Injectable 25 Gram(s) IV Push once  epoetin odalis (EPOGEN) Injectable 12563 Unit(s) IV Push <User Schedule>  ferrous    sulfate Liquid 300 milliGRAM(s) Oral daily  glucagon  Injectable 1 milliGRAM(s) IntraMuscular once  hydrocortisone sodium succinate Injectable 25 milliGRAM(s) IV Push daily  insulin lispro (ADMELOG) corrective regimen sliding scale   SubCutaneous every 6 hours  insulin NPH human recombinant 11 Unit(s) SubCutaneous every 6 hours  midodrine. 30 milliGRAM(s) Oral every 8 hours  pantoprazole  Injectable 40 milliGRAM(s) IV Push two times a day  petrolatum Ophthalmic Ointment 1 Application(s) Both EYES two times a day  potassium phosphate / sodium phosphate Powder (PHOS-NaK) 1 Packet(s) Oral once  sodium chloride 2 Gram(s) Oral two times a day  sodium chloride 3%  Inhalation 4 milliLiter(s) Inhalation every 6 hours  valACYclovir 500 milliGRAM(s) Oral every 24 hours    VITALS:  T(C): , Max: 38.6 (10-02-23 @ 23:40)  T(F): , Max: 101.5 (10-02-23 @ 23:40)  HR: 98 (10-03-23 @ 08:23)  BP: 117/46 (10-03-23 @ 07:00)  RR: 22 (10-03-23 @ 07:00)  SpO2: 97% (10-03-23 @ 08:23)    I and O's:    10-02 @ 07:01  -  10-03 @ 07:00  --------------------------------------------------------  IN: 1360 mL / OUT: 2400 mL / NET: -1040 mL    PHYSICAL EXAM:  Constitutional: minimally communicative at present; on vent  HEENT: trach-vent, (+)NGT  Respiratory: coarse BS b/l  Cardiovascular: tachy reg s1s2  Gastrointestinal: BS+, soft, NT/ND  Extremities: + peripheral edema  Neurological: tone WNL  : (+) Wheeler  Skin: No rashes  Access: (+)[facility]    LABS:                        7.7    11.17 )-----------( 348      ( 03 Oct 2023 02:49 )             23.5     10-03    138  |  99  |  40<H>  ----------------------------<  141<H>  3.6   |  27  |  1.41<H>    Ca    8.4      03 Oct 2023 02:49  Phos  1.6     10-03  Mg     1.90     10-03        Urine Studies:  Urinalysis Basic - ( 03 Oct 2023 02:49 )    Color: x / Appearance: x / SG: x / pH: x  Gluc: 141 mg/dL / Ketone: x  / Bili: x / Urobili: x   Blood: x / Protein: x / Nitrite: x   Leuk Esterase: x / RBC: x / WBC x   Sq Epi: x / Non Sq Epi: x / Bacteria: x    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  [phone]     RENAL ATTENDING NOTE  Patient seen and examined with NP. Agree with assessment and plan as above.  Counseled  and daughter at bedside regarding potential need for adjustment in tube feeds if hypophosphatemia persists  Jimmy Colon MD  [facility]  [phone]

## 2023-10-03 NOTE — PROGRESS NOTE ADULT - ASSESSMENT
74 y/o F well known to me from my [facility] outpt practice. she was admitted at [facility] 7/12-7/22 w aspiration PNA, was treated w CEFEPIME, developed an allergic rash,  dCHF, + MAC on AFB culture, had been progressively getting more and more lethargic and dyspneic at home since DC.   In  am of 8/11/23  ptn presented with respiratory distress w hypoxia and hypercarbia requiring intubation 2/2 volume overload +/- Asp PNA      Neuro   responds appropriately   Baseline MS AOx3, aphasic   - h/o CVA , on aspirin and statin . resumed w feeding tube, ASA resumed 8/26  - eeg  2/2 tremors, no sz focus  - less responsive in the past few days  - MRI 8/17:  new R cerebellar infarct, old left PCA/Occipital infarct. 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 Mcnulty, now in MICU  s/p septic shock overnight 9/1, now on meropenem, HDS  s/p trache, on vent support, copious secretions      Renal     Ptn well know to Dr. Colon,following   HD 8/19,  8/21, 8/26 and 8/28.  s/p PUF 8/29 2.5 Liters, HD 8/30,  9/1, 9/4  L IJ HD placed  uremic  hyponatremic  HD as per renal        Hypotension  - Levo drip  prognosis is poor  consider palliative care consult    GI  NPO  on tube feeds  coffee ground emesis x 1 8/24, melena overnight 8/31, s/p 1UPRBC, EGD/Colonoscopy: erosive gastropathy, esophagisits, on colonoscopy old blood seen no active bleeding, poor prep  family to decide re PEG placement    Endocrine  T2DM   A1C 7.1 in 7/2023   - BG goal 140-200     ID   No fever/leukocytosis, recheck temp   Hx latent TB which was treated, no concern for TB   - grew MAC on AFB culture from most recent admission. at [facility]  -MSSA Bacteremia 9/1, allergic to cephalosprins and PCN, on Vanco as per ID, renal dosing,   - sputum also grew E.Coli-ESBL, as per ID recs for  Bradford through 9/7( 1 week course as per ID) , afebrile.  - 9/8:  spike  temp 38.1C, has O. externa, has a wick, recs are to get a CT to R/O an abscess/bony involvement. cont Meropenem  9/9: ptn w fever overnight to 100.8,  may need shiley pulled if bacteremic, needs NECK CT as per ENT to R/O Mastoid bone involvement while having ongoing purulent DC from L ear 2/2 O. externa, getting daily wick changes  9/10:  spiked 102 overnight through Bradford and Vanco, purulent DC from O. externa, ENT recommend Ct of mastoid. ptn in HD, has Shiley in place, consider pulling shiley and send for Cx. would d/w Renal, and consider contacting  ID, last seen by Dr. Conner 9/6 9/11: remains febrile, Dr. Conner reconsulted. cont Bradford, Vanco  9/12: tmax 100.5, fever curve is improving, awaiting Left ear CT, on Bradford since 9/1. on  vanco for MSSA Bacteremia, does not tolerate cephalosporins. through 10/2  9/13: on full vent support via trache, appears uncomfortable and onur 2/2 Left O. externa. has an incidental left middle ear tumor, no acute middle ear interventions recommended, left ear wick replaced. on Meropenem, cx from Ear DC is neg. On Vanco for MSSA bacteremia through 10/2, course of Meropenem as per ID, afebrile in the past 24 hrs . Cardura for HTN resumed, HGB dropped to 6.4, got a dose of EPO and 1UPRBC, rpt 7.8, remains on HEPARIN drip for LEFT popliteal DVT  9/14: cont on Mupirocin locally to Left ear, cont IV Bradford, ENT signed off, afebrile x 48 hrs, Mro course to be determined by ID, on Vanco for MSSA bacteremia through 10/2. ongoing worsening hyponatremia, Hypertonic saline as per renal, no HD today, s/p 1UPRBC 9/13  9/15: spiking temps again, if cont to spike as per ID re PAN scan. cont Vanco for MSSA Bacteremia  9/16-18: no temp overnight  afebrile, cont to have Left ear DC,   on Vanco and ,bradford through 10/2  On IV Fluconazole for fungal cx+ from O. externa Discharge.   fluconazole started 9/21, ptn developed an allergic rash on 9/22. doubt its 2/2 to MEROPENEM or Vanco.  MEropenem was DCed 2/2 causing possible drug rash.  on VANCO based on levels for MSSA bacteremia but DCed 2/2 poss drug rash, now on Daptomycin  9/29: ptn is septic, meropenem resumed, s/p HD 9/28, next HD tomorrow    Heme/Onc  ppx: place on SCD  Transfuse for hgb 6.4, no obv bleed. HDS  LEFT POPLITEAL VEIN ACUTE DVT on 9/10, on Heparin drip    Ethics  GOC - Discussed GOC with daughter and , they have opted in the past for full code. and she remains full code at present      9/27:  In [place], R IJ HD catheter placed, NGT in place, acidotic on VBG, trache to vent, anasarca, on IV BUMEX, on Levo, on Heparin drip, on stress dose HYdrocortisone, FS in range, uremic, awaiting HD, CT C/A/P w no acute findings. VANCO Dced , now on Dapto, for MSSA baceteremia through 10/2    9/28: HD started 9/27, still encephalopathic, fluconazole DCed as it could be the cause of the rash. on Dapto to complete a course of Abx for MSSA bacteremia through 10/2, VANCO DCed 2/2 possible rash. off pressors    9/30: sepsis resolved, off fluconazole, would add FLUCONAZOLE to the allergy list. on Meropenem, on Dapto through 10/2 instead of Vanco.Vanco was DCed as preseumed ptn had an allergic rash to Vanco. she is not allergic to Vanco. vent dependent. tolerating HD. s/p HD today, next on 10/3    10/1: back in RCU, trach to vent,  tolerating HD well, on Bradford, Dapto, off fluconazole 2/2 allergic rxn    10/2:  trache to vent, completed a course of ABx for MSSA bacteremia TODAY, sputum grew P. aeruginosa resistant to MEROPENEM, ptn was switched to ZOSYN. 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 copious secretions. temp last night. ptn got 2 gm dose of AZACTAM today at 6 pm, ptn seen at 8 pm, has facial swelling and erythema, no stridor, ID made aware. Daughter and  at bedside state the allergic rash post ZOsyn had improved after DC zosyn. last dose of ZOsyn today at 2 pm, prior to that 11 am.   may need to switch to Amikacin if reaction will cont on Azactam. cont Benadryl. rpt Head ct tonight to r/o new CVA. HD as per renal

## 2023-10-03 NOTE — PROGRESS NOTE ADULT - ASSESSMENT
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 While in MICU patient noted with worsening renal failure requiring HD initiation, CGE/ Melena s/p EGD 8/31 found with esophagitis and erosive gastropathy, new right cerebellar infarct and fevers second to ESBL COLI and MSSA VAP, MSSA bacteremia, left ear otitis externa and drug rxn to cephalosporins. Course further complicated by prolonged vent time s/p tracheostomy 9/1 and transferred to RCU 9/3 completed by recurrent fevers, high PIPs with hypervolemia, mucous plug and tracheomalacia with dynamic collapse, progressive left ear OM, and left eye conjunctivitis? Case discussed at length renal and attempted renal recovery however failed and R IJ SHILEY failed. Attempted volume removal with Bumex GTT however course complicated by hypotension requiring transfer back to MICU 9/26. Diuresis dc'ed, pressors weaned off and stress steroids started. Called Optho and left eye with possible uveitits and Ofloxacin drops, Valcyte PO, Erythromycin drops, and artificial tears/ lacrilube continued. L IJ SHILEY placed (9/30) and HD reinstated with good volume removal. Course further complicated by AOCD and  PSA and Proteus VAP and Bradford and DAPTO continued. Patient transferred back to RCU 10/1.    NEUROLOGY  # NEW cerebella infarct   - Baseline MS AOX3 with short term memory changes per family however noted with concern for poor mentation in ICU  - MRI (8/17) with NEW right cerebellar infarct and old left PCA/occipital infarcts  - STEPHANIE (8/17) with AV showing two linear mobile echogenic elements attached to valve leaflets consistent with Lambel's excrescence, but no TRINITY thrombus, and lambel's excrescence with uncertain clinical implication.   - EEG (8/11-8/15) with no seizures   - ILR interrogation (9/7) with SR and PACs falsely recorded as AF.   - Attempted to resume ASA for medical management but held given GIB   - Continue on lipitor for medical management   - Course complicated by questionable head and body shaking 9/8 however prolactin elevated. Discussed for spot EEG however held given low likelihood of seizures noted and EEG held.     # Metabolic vs Uremic Encephalopathy   - Lethargy noted with progression to stupor thought to be second to uremia.   - RPT CTH (9/26) with vague areas of hypoattenuation within the bilateral cerebellar hemispheres which may be compatible with acute/subacute ischemia.   - Discussed CTH with neurology and no signs of new infarct noted.   - HD reinstated in ICU with improvement in uremia however mentation remained   - Poor mentation likely in setting of infections.   - Monitor mentation closely     CARDIOVASCULAR  # HTN Urgency   # Septic vs vasoplegic shock   - Labile BPs ranging in between SBP 80s-200s and attempted labetalol, however noted with hypotension and bradycardia likely from BB toxicity vs shock state?    - Holding Labetalol, Catapres and Catapres.    - Attempted volume removal with bumex diuresis however noted with return of shock state requiring pressor support and transfer back to ICU.   - Pressors weaned off to stress dose and Midodrine   - Wean off Solucortef 50 TID (9/27-9/29) then Solucortef 25 TID (9/29-10/2) and now on Solucortef 25 QD (10/2 - 10/4) then DONE  - Continue on Midodrine 30 TID (9/29 - ) and wean as tolerated     # Hx of HFpEF with likely ADHF with volume overload.   - ECHO (7/2023) with EF 59 with severe LVH and diastolic dysfunction   - STEPHANIE (8/18) with normal LVRVSF however noted with increased LA pressures and persistent LA septal bowing into RA.   - ECHO (8/11) with EF 60 with mild LVH, normal LVRVSF, and mild ddfxn.  - Remove volume with HD sessions     HEENT   # Left ear OE   - ENT evaluation (9/7) with no mastoid tenderness, however found with positive swelling and excoriation to left auricle and tenderness to manipulation of auricle. Debrided crusting of auricle and EAC evaluated with edema and unable to visualize TM. EAC debrided and found with watery exudate.   - CT IAC with acute on chronic left-sided otitis externa and acute on chronic left-sided otomastoiditis. Superimposed left-sided tympanosclerosis. Superimposed cholesteatoma formation within the left tympanomastoid cavity cannot be excluded  - Continued on CiproHC (9/5 - 9/16) and Bradford (9/1-10/1)   - Case discussed with ENT at length and now continued on acetic acid drops BID to left ear and bactroban ointment to left pinna BID.     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   - Patient with tongue pain and seen with anterior ulcerations consolidating and crusting and posterior ulceration.  - Case discussed with pathology resident and culture/ cytology sent.   - HSV negative and Path (9/6) with normal appearing epithelial cells singly and in clusters devoid of viral cytopathic effect.   - Continue on magic mouth wash for pain    RESPIRATORY  # AHHRF second to volume overload   - Hx of CKD and HFpEF presented with SOB and hypercarbia second to volume overload requiring intubation and HD initiation   - Vent weaning attempted however limited requiring tracheostomy (9/1) with IP   - Course complicated by PIPs elevated and found with tracheomalacia.   - CT CHEST (9/8) with tracheomalacia, BL pleural effusions and continued consolidations   - Continue on vent and given TBM increased PEEP (9/9) to 20/300/8/40 with improvement   - Continue on duonebs for airway clearance   - SBT when mentation improves.      GI  # UGIB   - CGE x 1 episode on 8/24 and melena x 2 episodes on 8/31 likely residual blood.   - EGD (8/31) found with esophagitis and erosive gastropathy  - Continue on PPI BID through late October and then PPI QD     # Dysphagia   - NGT-TF continued   - Pending PEG however needs improvement prior to placement.     RENAL  # Progressive CKD   - Presented volume overload and progressive RUSS on CKD   - POCUS with no signs of hydronephrosis in ICU  - Pressor support started with improvement in renal function in ICU  - Attempted steroid course in ICU without improvement in renal function   - Case discussed at length with renal and initiated on HD in ICU   - Remain on HD while in RCU however noted to be volume overloaded. Attempted Bumex pushes and patient noted with good UOP.   - Attempted renal recovery in RCU however noted with decent UOP, but BUN/ CRE continued to rise without improvement on diuresis.   - Ultimately resumed on HD (9/27, 9/28, 9/30 and 10/2)   - Continue on HD with aggressive UF as tolerated per Renal    # Hyponatremia   - Continue on salt tabs 2G    - Monitor sodium     # Hyperphosphatemia to Hypophosphatemia with HD  - PTH normal and elevated phos likely from renal failure  - Phos binder with Renvela started with improvement however then noted with hypophosphatemia and Renvela stopped.     INFECTIOUS DISEASE  # ESBL ECOLI and MSSA VAP c/b MSSA bacteremia   - RVP/ COVID (8/11) negative   - SCx (8/11) with MSSA s/p cefepime (8/12-8/13) and then ancef (8/14) however noted with drug reaction and then changed to vanco and aztreonam (8/12-8/13) in ICU .   - Course complicated by recurrent fevers and return of MSSA with bacteremia.   - SCx (9/1) with MSSA and ESBL ECOLI   - BAL (9/1) with MSSA   - BCx (9/1) with MSSA and cleared on (9/4)   - ECHO (9/6) unable to rule out endocarditis   - Continue on Vanco (9/4-9/22) however noted with rashes of uncertain etiology and replaced with Bradford (9/4 - 10/2) and DAPTO (9/26-10/2) for  completion.     # Proteus and  PSA VAP/ Trachitis   - Continued fevers and SCx (9/29) with  PSA and Proteus   - Continued on Bradford as above however noted to be resistant and now changed to Zosyn (10/2 - ). MONITOR CLOSELY GIVEN DRUG RXN IN PAST!    # MRSA PCRs   - MRSA PCR (8/11 and 8/14) negative   - MRSA PCR (9/10) with MSSA and s/p bactroban in ICU   - MRSA PCR (9/26) with MSSA and s/p bactroban in ICU   - Next MRSA PCR (10/22)     HEME  # Anemia second to GIB vs renal disease   - s/p multiple units of PRBCs   - Anemia panel with AOCD but with low %sat and ferrous sulfate added to optimize   - Despite iron intermittent anemia noted without bleeding source and EPO added.   - HH dropped (10/2) and s/p 1U PRBC.   - Monitor HH     VASCULAR   # LLE POP DVT   - US BL LE (9/10) with acute left deep vein thrombosis of the left popliteal vein.  - RUE swollen and pending VA DOPPLER.   - Continue on Eliquis     ONC   # Hx of Breast CA   - Patient dx in 2018 and s/p BL mastectomy (radical on right) and chemo and RT.   - Supportive care.     ENDOCRINE  # IDDM2   - Continue on NPH 3U and ISS   - Monitor FS and adjust as needed     LINES/ TUBES  - PACO TAYLOR (9/27 - )     SKIN  # Drug Eruption   - Attempted cefepime (8/12) vs ancef (8/13-8/14) and then noted with drug rxn.   - Hold further cephalosporins at this time   - Continue on steroids with prednisone 40 (8/18 - 9/2) then prednisone 30 (9/3-9/7), then prednisone 20 (9/8 - 9/10), then prednisone 10mg (9/11-9/13) then turn off.   - Monitor for further drug rxns.     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 While in MICU patient noted with worsening renal failure requiring HD initiation, CGE/ Melena s/p EGD 8/31 found with esophagitis and erosive gastropathy, new right cerebellar infarct and fevers second to ESBL COLI and MSSA VAP, MSSA bacteremia, left ear otitis externa and drug rxn to cephalosporins. Course further complicated by prolonged vent time s/p tracheostomy 9/1 and transferred to RCU 9/3 completed by recurrent fevers, high PIPs with hypervolemia, mucous plug and tracheomalacia with dynamic collapse, progressive left ear OM, and left eye conjunctivitis? Case discussed at length renal and attempted renal recovery however failed and R IJ SHILEY failed. Attempted volume removal with Bumex GTT however course complicated by hypotension requiring transfer back to MICU 9/26. Diuresis dc'ed, pressors weaned off and stress steroids started. Called Optho and left eye with possible uveitits and Ofloxacin drops, Valcyte PO, Erythromycin drops, and artificial tears/ lacrilube continued. L IJ SHILEY placed (9/30) and HD reinstated with good volume removal. Course further complicated by AOCD and  PSA and Proteus VAP and Bradford and DAPTO continued. Patient transferred back to RCU 10/1.    NEUROLOGY  # NEW cerebella infarct   - Baseline MS AOX3 with short term memory changes per family however noted with concern for poor mentation in ICU  - MRI (8/17) with NEW right cerebellar infarct and old left PCA/occipital infarcts  - STEPHANIE (8/17) with AV showing two linear mobile echogenic elements attached to valve leaflets consistent with Lambel's excrescence, but no TRINITY thrombus, and lambel's excrescence with uncertain clinical implication.   - EEG (8/11-8/15) with no seizures   - ILR interrogation (9/7) with SR and PACs falsely recorded as AF.   - Attempted to resume ASA for medical management but held given GIB   - Continue on lipitor for medical management   - Course complicated by questionable head and body shaking 9/8 however prolactin elevated. Discussed for spot EEG however held given low likelihood of seizures noted and EEG held.     # Metabolic vs Uremic Encephalopathy   - Lethargy noted with progression to stupor thought to be second to uremia.   - RPT CTH (9/26) with vague areas of hypoattenuation within the bilateral cerebellar hemispheres which may be compatible with acute/subacute ischemia.   - Discussed CTH with neurology and no signs of new infarct noted.   - HD reinstated in ICU with improvement in uremia however mentation remained   - Poor mentation likely in setting of infections.   - Monitor mentation closely     CARDIOVASCULAR  # HTN Urgency   # Septic vs vasoplegic shock   - Labile BPs ranging in between SBP 80s-200s and attempted labetalol, however noted with hypotension and bradycardia likely from BB toxicity vs shock state?    - Holding Labetalol, Catapres and Catapres.    - Attempted volume removal with bumex diuresis however noted with return of shock state requiring pressor support and transfer back to ICU.   - Pressors weaned off to stress dose and Midodrine   - Wean off Solucortef 50 TID (9/27-9/29) then Solucortef 25 TID (9/29-10/2) and now on Solucortef 25 QD (10/2 - 10/4) then DONE  - Continue on Midodrine 30 TID (9/29 - ) and wean as tolerated     # Hx of HFpEF with likely ADHF with volume overload.   - ECHO (7/2023) with EF 59 with severe LVH and diastolic dysfunction   - STEPHANIE (8/18) with normal LVRVSF however noted with increased LA pressures and persistent LA septal bowing into RA.   - ECHO (8/11) with EF 60 with mild LVH, normal LVRVSF, and mild ddfxn.  - Remove volume with HD sessions     HEENT   # Left ear OE   - ENT evaluation (9/7) with no mastoid tenderness, however found with positive swelling and excoriation to left auricle and tenderness to manipulation of auricle. Debrided crusting of auricle and EAC evaluated with edema and unable to visualize TM. EAC debrided and found with watery exudate.   - CT IAC with acute on chronic left-sided otitis externa and acute on chronic left-sided otomastoiditis. Superimposed left-sided tympanosclerosis. Superimposed cholesteatoma formation within the left tympanomastoid cavity cannot be excluded  - Continued on CiproHC (9/5 - 9/16) and Bradford (9/1-10/1)   - Case discussed with ENT at length and now continued on acetic acid drops BID to left ear and bactroban ointment to left pinna BID.   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   - Patient with tongue pain and seen with anterior ulcerations consolidating and crusting and posterior ulceration.  - Case discussed with pathology resident and culture/ cytology sent.   - HSV negative and Path (9/6) with normal appearing epithelial cells singly and in clusters devoid of viral cytopathic effect.   - Continue on magic mouth wash for pain    RESPIRATORY  # AHHRF second to volume overload   - Hx of CKD and HFpEF presented with SOB and hypercarbia second to volume overload requiring intubation and HD initiation   - Vent weaning attempted however limited requiring tracheostomy (9/1) with IP   - Course complicated by PIPs elevated and found with tracheomalacia.   - CT CHEST (9/8) with tracheomalacia, BL pleural effusions and continued consolidations   - Continue on vent and given TBM increased PEEP (9/9) to 20/300/8/40 with improvement   - Continue on duonebs for airway clearance   - SBT when mentation improves.      GI  # UGIB   - CGE x 1 episode on 8/24 and melena x 2 episodes on 8/31 likely residual blood.   - EGD (8/31) found with esophagitis and erosive gastropathy  - Continue on PPI BID through late October and then PPI QD     # Dysphagia   - NGT-TF continued   - Pending PEG however needs improvement prior to placement.     RENAL  # Progressive CKD   - Presented volume overload and progressive RUSS on CKD   - POCUS with no signs of hydronephrosis in ICU  - Pressor support started with improvement in renal function in ICU  - Attempted steroid course in ICU without improvement in renal function   - Case discussed at length with renal and initiated on HD in ICU   - Remain on HD while in RCU however noted to be volume overloaded. Attempted Bumex pushes and patient noted with good UOP.   - Attempted renal recovery in RCU however noted with decent UOP, but BUN/ CRE continued to rise without improvement on diuresis.   - Ultimately resumed on HD (9/27, 9/28, 9/30 and 10/2)   - Continue on HD with aggressive UF as tolerated per Renal    # Hyponatremia   - Continue on salt tabs 2G    - Monitor sodium     # Hyperphosphatemia to Hypophosphatemia with HD  - PTH normal and elevated phos likely from renal failure  - Phos binder with Renvela started with improvement however then noted with hypophosphatemia and Renvela stopped.     INFECTIOUS DISEASE  # ESBL ECOLI and MSSA VAP c/b MSSA bacteremia   - RVP/ COVID (8/11) negative   - SCx (8/11) with MSSA s/p cefepime (8/12-8/13) and then ancef (8/14) however noted with drug reaction and then changed to vanco and aztreonam (8/12-8/13) in ICU .   - Course complicated by recurrent fevers and return of MSSA with bacteremia.   - SCx (9/1) with MSSA and ESBL ECOLI   - BAL (9/1) with MSSA   - BCx (9/1) with MSSA and cleared on (9/4)   - ECHO (9/6) unable to rule out endocarditis   - Continue on Vanco (9/4-9/22) however noted with rashes of uncertain etiology and replaced with Bradford (9/4 - 10/2) and DAPTO (9/26-10/2) for  completion.     # Proteus and  PSA VAP/ Trachitis   - Continued fevers and SCx (9/29) with  PSA and Proteus   - Continued on Bradford as above however noted to be resistant and now changed to Zosyn (10/2 - ). MONITOR CLOSELY GIVEN DRUG RXN IN PAST!  - Rash developed with Zosyn overnight (10/2) and abx switched to Aztreonam (10/3 - ) based on cultures.   - Will repeat SCX (10/3)    # MRSA PCRs   - MRSA PCR (8/11 and 8/14) negative   - MRSA PCR (9/10) with MSSA and s/p bactroban in ICU   - MRSA PCR (9/26) with MSSA and s/p bactroban in ICU   - Next MRSA PCR (10/22)     HEME  # Anemia second to GIB vs renal disease   - s/p multiple units of PRBCs   - Anemia panel with AOCD but with low %sat and ferrous sulfate added to optimize   - Despite iron intermittent anemia noted without bleeding source and EPO added.   - HH dropped (10/2) and s/p 1U PRBC.   - S/P 1/2u PRBC (10/3) with good response.  - Monitor HH     VASCULAR   # LLE POP DVT   - US BL LE (9/10) with acute left deep vein thrombosis of the left popliteal vein.  - RUE swollen and pending VA DOPPLER.   - Continue on Eliquis     ONC   # Hx of Breast CA   - Patient dx in 2018 and s/p BL mastectomy (radical on right) and chemo and RT.   - Supportive care.     ENDOCRINE  # IDDM2   - Continue on NPH 3U and ISS   - Monitor FS and adjust as needed     LINES/ TUBES  - PACO TAYLOR (9/27 - )     SKIN  # Drug Eruption   - Attempted cefepime (8/12) vs ancef (8/13-8/14) and then noted with drug rxn.   - Hold further cephalosporins at this time   - Continue on steroids with prednisone 40 (8/18 - 9/2) then prednisone 30 (9/3-9/7), then prednisone 20 (9/8 - 9/10), then prednisone 10mg (9/11-9/13) then turn off.   - Monitor for further drug rxns.     ETHICS/ GOC    - FULL CODE     DISPO - TBD

## 2023-10-03 NOTE — CHART NOTE - NSCHARTNOTEFT_GEN_A_CORE
Patient assessed at bedside by provider s/p Aztreonam. On eval, patient noted to have redness/flushing to right side of face. Dr Brady also bedside. Aztreonam d/c. Benadryl x1 given. ID following, recs appreciated. Patient assessed at bedside by provider s/p Aztreonam. On eval, patient noted to have redness/flushing to right side of face. Dr Brady also bedside. Aztreonam d/c. Benadryl x1 given. ID following, recs appreciated.      Addendum 22:45:   CTH from this evening w/o acute findings. RUE VA doppler from today showing RIJ non occlusive DVT and right brachial occlusive DVT. Patient on Eliquis 5mg BID. Dr Brady aware, continue current management. Patient assessed at bedside by provider s/p Aztreonam. On eval, patient noted to have redness/flushing to right side of face. Dr Brady also bedside. Aztreonam d/c. Benadryl x1 given. ID following, recs appreciated.      Addendum 22:45:   CTH from this evening w/o acute findings. RUE VA doppler from today showing RIJ non occlusive DVT and right brachial occlusive DVT. Patient on Eliquis 5mg BID. Dr Brady aware, continue current management.    Addendum 10/4/23 1:30:  Notified by RN that patient's daughter concerned over her pulling at trach. Patient seen at bedside by provider. Patient noted to be calm, however using left hand to touch face and trach. Will place back in left unsecured mitten. Will continue to monitor and support patient.

## 2023-10-04 NOTE — PROGRESS NOTE ADULT - SUBJECTIVE AND OBJECTIVE BOX
Patient is a 73y old  Female who presents with a chief complaint of Respiratory distress (04 Oct 2023 17:22)      SUBJECTIVE / OVERNIGHT EVENTS: no fever overnight, remains on AZTREONAM, rash resolved, next dose tonight    MEDICATIONS  (STANDING):  albuterol/ipratropium for Nebulization 3 milliLiter(s) Nebulizer every 6 hours  apixaban 5 milliGRAM(s) Oral two times a day  artificial tears (preservative free) Ophthalmic Solution 1 Drop(s) Both EYES every 4 hours  aspirin  chewable 81 milliGRAM(s) Enteral Tube daily  atorvastatin 10 milliGRAM(s) Oral at bedtime  aztreonam  IVPB 2000 milliGRAM(s) IV Intermittent <User Schedule>  chlorhexidine 0.12% Liquid 15 milliLiter(s) Oral Mucosa every 12 hours  chlorhexidine 2% Cloths 1 Application(s) Topical daily  dextrose 5%. 1000 milliLiter(s) (50 mL/Hr) IV Continuous <Continuous>  dextrose 5%. 1000 milliLiter(s) (100 mL/Hr) IV Continuous <Continuous>  dextrose 50% Injectable 12.5 Gram(s) IV Push once  dextrose 50% Injectable 25 Gram(s) IV Push once  dextrose 50% Injectable 25 Gram(s) IV Push once  epoetin odalis (EPOGEN) Injectable 19905 Unit(s) IV Push <User Schedule>  ferrous    sulfate Liquid 300 milliGRAM(s) Oral daily  glucagon  Injectable 1 milliGRAM(s) IntraMuscular once  insulin lispro (ADMELOG) corrective regimen sliding scale   SubCutaneous every 6 hours  insulin NPH human recombinant 11 Unit(s) SubCutaneous every 6 hours  midodrine. 30 milliGRAM(s) Oral every 8 hours  pantoprazole  Injectable 40 milliGRAM(s) IV Push two times a day  petrolatum Ophthalmic Ointment 1 Application(s) Both EYES two times a day  sodium chloride 2 Gram(s) Oral two times a day  sodium chloride 3%  Inhalation 4 milliLiter(s) Inhalation every 6 hours  valACYclovir 500 milliGRAM(s) Oral every 24 hours    MEDICATIONS  (PRN):  acetaminophen   Oral Liquid .. 650 milliGRAM(s) Oral every 6 hours PRN Temp greater or equal to 38C (100.4F), Mild Pain (1 - 3)  calamine/zinc oxide Lotion 1 Application(s) Topical two times a day PRN Rash and/or Itching  dextrose Oral Gel 15 Gram(s) Oral once PRN Blood Glucose LESS THAN 70 milliGRAM(s)/deciliter  sodium chloride 0.9% lock flush 10 milliLiter(s) IV Push every 1 hour PRN Pre/post blood products, medications, blood draw, and to maintain line patency      Vital Signs Last 24 Hrs  T(F): 98.9 (10-04-23 @ 17:45), Max: 99.9 (10-03-23 @ 21:10)  HR: 101 (10-04-23 @ 17:45) (98 - 124)  BP: 118/52 (10-04-23 @ 17:45) (99/55 - 128/41)  RR: 20 (10-04-23 @ 17:45) (20 - 22)  SpO2: 100% (10-04-23 @ 17:45) (95% - 100%)  Telemetry:   CAPILLARY BLOOD GLUCOSE      POCT Blood Glucose.: 155 mg/dL (04 Oct 2023 16:52)  POCT Blood Glucose.: 238 mg/dL (04 Oct 2023 11:05)  POCT Blood Glucose.: 190 mg/dL (04 Oct 2023 05:51)  POCT Blood Glucose.: 168 mg/dL (03 Oct 2023 23:49)    I&O's Summary    03 Oct 2023 07:01  -  04 Oct 2023 07:00  --------------------------------------------------------  IN: 1260 mL / OUT: 0 mL / NET: 1260 mL        PHYSICAL EXAM:  GENERAL: NAD, well-developed  HEAD:  Atraumatic, Normocephalic  EYES: EOMI, PERRLA, conjunctiva and sclera clear  NECK: Supple, No JVD  CHEST/LUNG: Clear to auscultation bilaterally; No wheeze  HEART: Regular rate and rhythm; No murmurs, rubs, or gallops  ABDOMEN: Soft, Nontender, Nondistended; Bowel sounds present  EXTREMITIES:  2+ Peripheral Pulses, No clubbing, cyanosis, or edema  PSYCH: AAOx3  NEUROLOGY: non-focal  SKIN: No rashes or lesions    LABS:                        9.2    14.93 )-----------( 397      ( 04 Oct 2023 05:50 )             29.0     10-04    137  |  98  |  51<H>  ----------------------------<  198<H>  3.7   |  29  |  1.77<H>    Ca    8.8      04 Oct 2023 05:50  Phos  2.6     10-04  Mg     2.10     10-04    TPro  6.0  /  Alb  2.4<L>  /  TBili  0.3  /  DBili  x   /  AST  46<H>  /  ALT  28  /  AlkPhos  345<H>  10-04          Urinalysis Basic - ( 04 Oct 2023 05:50 )    Color: x / Appearance: x / SG: x / pH: x  Gluc: 198 mg/dL / Ketone: x  / Bili: x / Urobili: x   Blood: x / Protein: x / Nitrite: x   Leuk Esterase: x / RBC: x / WBC x   Sq Epi: x / Non Sq Epi: x / Bacteria: x        RADIOLOGY & ADDITIONAL TESTS:    Imaging Personally Reviewed:    Consultant(s) Notes Reviewed:      Care Discussed with Consultants/Other Providers:

## 2023-10-04 NOTE — CONSULT NOTE ADULT - PROBLEM SELECTOR RECOMMENDATION 9
1. Wick placed. apply ciprodex drops to wick 4 drops bid  2. Trend CNC  3. Consider ID consult. patient Diabetic with OE  4. ENT will follow. will present to ENT attending.
may be 2/2 volume overload i/s/o CKD vs. CHF  elevated proBNP   now intubated   ECHO 8/11 - EF 60%, mild diastolic dysfunction, min TR, right pleural effusiom  IV diuresis as per ICU  monitor UO and Cr  management as per ICU
Pt with prolonged hospital course (see assessment above) s/p trach on 9/1   > Appreciate RCU care- continue vent weaning as tolerated   > Continue duonebs  > Pt on abx. appreciate ID recs

## 2023-10-04 NOTE — CONSULT NOTE ADULT - PROBLEM SELECTOR RECOMMENDATION 4
FULL CODE, continue all medical interventions per family's goals.   Family is very hopeful for recovery.  [employer]apport building

## 2023-10-04 NOTE — CHART NOTE - NSCHARTNOTEFT_GEN_A_CORE
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 Started on HD for worsening renal failure. CGE/melena s/p EGD (8/31) found with esophagitis and erosive gastropathy. New right cerebellar infarct and fevers second to ESBL COLI and MSSA VAP, MSSA bacteremia, left ear otitis externa and drug rxn to cephalosporins. Course further complicated by prolonged vent time s/p tracheostomy (9/1). S/p multiple units of PRBCs. Hypotensive, transferred to MICU. NGT replaced (9/27). Pending PEG however needs improvement in infectious status prior to placement.     Nutrition Course: Unable to conduct nutrition interview 2/2 cognitive status, collateral obtained from  at bedside and chart review. Pt continues to receive Nepro @ 40ml/hr x24hrs to provide 960ml total formula, 1728kcal (32kcal/kg IBW), 77g pro, 697ml free water from formula. Pt also receiving prosource (formulary changed to LPS) 1x/day to provide total of 92g pro (1.7g/kg IBW). No reported intolerance to TF. Last BM 10/4 per RN flowsheets, noted banatrol and psyllium powder discontinued.   Last HD session 10/2. Renal electrolytes currently within normal limits. continues on salt tabs, Na within normal limits.     Diet Prescription: Diet, NPO with Tube Feed:   Tube Feeding Modality: Nasogastric  Nepro with Carb Steady (NEPRORTH)  Total Volume for 24 Hours (mL): 960  Continuous  Starting Tube Feed Rate {mL per Hour}: 40  Until Goal Tube Feed Rate (mL per Hour): 40  Tube Feed Duration (in Hours): 24  Tube Feed Start Time: 13:45  No Carb Prosource (1pkg = 15gms Protein)     Qty per Day:  1 (10-02-23 @ 13:44)    Pertinent Medications: MEDICATIONS  (STANDING):  albuterol/ipratropium for Nebulization 3 milliLiter(s) Nebulizer every 6 hours  apixaban 5 milliGRAM(s) Oral two times a day  artificial tears (preservative free) Ophthalmic Solution 1 Drop(s) Both EYES every 4 hours  aspirin  chewable 81 milliGRAM(s) Enteral Tube daily  atorvastatin 10 milliGRAM(s) Oral at bedtime  aztreonam  IVPB 2000 milliGRAM(s) IV Intermittent <User Schedule>  chlorhexidine 0.12% Liquid 15 milliLiter(s) Oral Mucosa every 12 hours  chlorhexidine 2% Cloths 1 Application(s) Topical daily  dextrose 5%. 1000 milliLiter(s) (50 mL/Hr) IV Continuous <Continuous>  dextrose 5%. 1000 milliLiter(s) (100 mL/Hr) IV Continuous <Continuous>  dextrose 50% Injectable 12.5 Gram(s) IV Push once  dextrose 50% Injectable 25 Gram(s) IV Push once  dextrose 50% Injectable 25 Gram(s) IV Push once  epoetin odalis (EPOGEN) Injectable 34268 Unit(s) IV Push <User Schedule>  ferrous    sulfate Liquid 300 milliGRAM(s) Oral daily  glucagon  Injectable 1 milliGRAM(s) IntraMuscular once  insulin lispro (ADMELOG) corrective regimen sliding scale   SubCutaneous every 6 hours  insulin NPH human recombinant 11 Unit(s) SubCutaneous every 6 hours  midodrine. 30 milliGRAM(s) Oral every 8 hours  pantoprazole  Injectable 40 milliGRAM(s) IV Push two times a day  petrolatum Ophthalmic Ointment 1 Application(s) Both EYES two times a day  sodium chloride 2 Gram(s) Oral two times a day  sodium chloride 3%  Inhalation 4 milliLiter(s) Inhalation every 6 hours  valACYclovir 500 milliGRAM(s) Oral every 24 hours    MEDICATIONS  (PRN):  acetaminophen   Oral Liquid .. 650 milliGRAM(s) Oral every 6 hours PRN Temp greater or equal to 38C (100.4F), Mild Pain (1 - 3)  calamine/zinc oxide Lotion 1 Application(s) Topical two times a day PRN Rash and/or Itching  dextrose Oral Gel 15 Gram(s) Oral once PRN Blood Glucose LESS THAN 70 milliGRAM(s)/deciliter  sodium chloride 0.9% lock flush 10 milliLiter(s) IV Push every 1 hour PRN Pre/post blood products, medications, blood draw, and to maintain line patency    Pertinent Labs: 10-04 Na137 mmol/L Glu 198 mg/dL<H> K+ 3.7 mmol/L Cr  1.77 mg/dL<H> BUN 51 mg/dL<H> 10-04 Phos 2.6 mg/dL 10-04 Alb 2.4 g/dL<L> 09-14 Chol 63 mg/dL LDL --    HDL 30 mg/dL<L> Trig 156 mg/dL<H>     CAPILLARY BLOOD GLUCOSE  POCT Blood Glucose.: 238 mg/dL (04 Oct 2023 11:05)  POCT Blood Glucose.: 190 mg/dL (04 Oct 2023 05:51)  POCT Blood Glucose.: 168 mg/dL (03 Oct 2023 23:49)  POCT Blood Glucose.: 157 mg/dL (03 Oct 2023 17:19)      Weight (kg): 66.5 (08-11 @ 13:15), 59 (07-12 @ 11:29)  BMI (kg/m2): 27.7 (08-11 @ 13:15), 25.4 (07-12 @ 11:29)  Weight Assessment: 8/15: 74.8kg, 9/24: 83kg, 10/2: 78kg. (Fluctuating weight trend likely reflecting edema. Edema may be masking weight loss)      Physical Assessment, per flowsheets:  Edema: +3 generalized edema  Skin: No pressure injuries per wound note 10/4.       Estimated Needs:   [X] No change since previous assessment, based on ideal body weight +10% (115 lbs / 52.5 kg)  8599-9528 kcal daily @30-35 kcal/kg  78.75-94.5 gm protein daily @1.5-1.8 gm/kg       Previous Nutrition Diagnosis: severe protein calorie malnutrition   Nutrition Diagnosis is [x ] ongoing    Education:  [x  ] Not warranted at present    Interventions:   1) Continue Nepro@40ml/hr x24 hrs   2) To reflect new formulary item recommend sugar free LPS 1x/day (100kcal, 15g pro). D/c No carb prosource.  3) Obtain pre/post HD weights   4) RD available prn    Monitor & Evaluate:  Tolerance to EN, nutrition related lab values, weight trends, BMs/GI distress, hydration status, skin integrity.    [place]| [id number] (Also available on TEAMS)

## 2023-10-04 NOTE — PROGRESS NOTE ADULT - SUBJECTIVE AND OBJECTIVE BOX
Patient sedated on vent.    HEENT:   left ear>   external without any crusting, erythema, ulceration. Skin intact. No discharge. No observable pain to manipulation.   EAC patent. No exudates, No discharge. TM intact.

## 2023-10-04 NOTE — PROGRESS NOTE ADULT - SUBJECTIVE AND OBJECTIVE BOX
Follow Up:  MSSA bacteremia    Interval History: pt was switched to aztreonam yesterday, now the rash is gone but last night there was ?facial erythema not sure if it was there before or happened suddenly    ROS:    Unobtainable because: trached          Allergies  isoniazid (Rash)  nafcillin (Unknown)  hydrALAZINE (Rash)  vitamin E (Short breath; Urticaria; Hives)  doxycycline (Rash)  cefepime (Rash)  NIFEdipine (Urticaria; Hives)        ANTIMICROBIALS:  valACYclovir 500 every 24 hours      OTHER MEDS:  acetaminophen   Oral Liquid .. 650 milliGRAM(s) Oral every 6 hours PRN  albuterol/ipratropium for Nebulization 3 milliLiter(s) Nebulizer every 6 hours  apixaban 5 milliGRAM(s) Oral two times a day  artificial tears (preservative free) Ophthalmic Solution 1 Drop(s) Both EYES every 4 hours  aspirin  chewable 81 milliGRAM(s) Enteral Tube daily  atorvastatin 10 milliGRAM(s) Oral at bedtime  calamine/zinc oxide Lotion 1 Application(s) Topical two times a day PRN  chlorhexidine 0.12% Liquid 15 milliLiter(s) Oral Mucosa every 12 hours  chlorhexidine 2% Cloths 1 Application(s) Topical daily  dextrose 5%. 1000 milliLiter(s) IV Continuous <Continuous>  dextrose 5%. 1000 milliLiter(s) IV Continuous <Continuous>  dextrose 50% Injectable 12.5 Gram(s) IV Push once  dextrose 50% Injectable 25 Gram(s) IV Push once  dextrose 50% Injectable 25 Gram(s) IV Push once  dextrose Oral Gel 15 Gram(s) Oral once PRN  epoetin odalis (EPOGEN) Injectable 33900 Unit(s) IV Push <User Schedule>  ferrous    sulfate Liquid 300 milliGRAM(s) Oral daily  glucagon  Injectable 1 milliGRAM(s) IntraMuscular once  insulin lispro (ADMELOG) corrective regimen sliding scale   SubCutaneous every 6 hours  insulin NPH human recombinant 11 Unit(s) SubCutaneous every 6 hours  midodrine. 30 milliGRAM(s) Oral every 8 hours  pantoprazole  Injectable 40 milliGRAM(s) IV Push two times a day  petrolatum Ophthalmic Ointment 1 Application(s) Both EYES two times a day  sodium chloride 2 Gram(s) Oral two times a day  sodium chloride 0.9% lock flush 10 milliLiter(s) IV Push every 1 hour PRN  sodium chloride 3%  Inhalation 4 milliLiter(s) Inhalation every 6 hours      Vital Signs Last 24 Hrs  T(C): 37.2 (04 Oct 2023 04:00), Max: 37.7 (03 Oct 2023 21:10)  T(F): 99 (04 Oct 2023 04:00), Max: 99.9 (03 Oct 2023 21:10)  HR: 111 (04 Oct 2023 11:49) (98 - 124)  BP: 105/45 (04 Oct 2023 05:37) (105/45 - 128/41)  BP(mean): --  RR: 22 (04 Oct 2023 05:37) (20 - 22)  SpO2: 100% (04 Oct 2023 11:49) (95% - 100%)    Parameters below as of 04 Oct 2023 05:37  Patient On (Oxygen Delivery Method): ventilator, AC    O2 Concentration (%): 40    Physical Exam:  General:    trached   Respiratory:   trach on vent  abd:  distended but soft  :     willard  Musculoskeletal : generalized edema  Skin: no rash now  vascular: TRISHA odom                                9.2    14.93 )-----------( 397      ( 04 Oct 2023 05:50 )             29.0       10-04    137  |  98  |  51<H>  ----------------------------<  198<H>  3.7   |  29  |  1.77<H>    Ca    8.8      04 Oct 2023 05:50  Phos  2.6     10-04  Mg     2.10     10-04    TPro  6.0  /  Alb  2.4<L>  /  TBili  0.3  /  DBili  x   /  AST  46<H>  /  ALT  28  /  AlkPhos  345<H>  10-04      Urinalysis Basic - ( 04 Oct 2023 05:50 )    Color: x / Appearance: x / SG: x / pH: x  Gluc: 198 mg/dL / Ketone: x  / Bili: x / Urobili: x   Blood: x / Protein: x / Nitrite: x   Leuk Esterase: x / RBC: x / WBC x   Sq Epi: x / Non Sq Epi: x / Bacteria: x        MICROBIOLOGY:  v  Trach Asp Tracheal Aspirate  10-03-23 --  --    Numerous polymorphonuclear leukocytes per low power field  Few Squamous epithelial cells per low power field  Numerous Gram Negative Rods seen per oil power field      .Blood Blood-Venous  09-29-23   No growth at 4 days  --  --      .Blood Blood-Peripheral  09-29-23   No growth at 4 days  --  --      ET Tube ET Tube  09-29-23   Testing in progress  --  --      ET Tube ET Tube  09-29-23   Numerous Proteus mirabilis  Moderate Pseudomonas aeruginosa (Carbapenem Resistant)  Normal Respiratory Cate absent  --  Proteus mirabilis  Pseudomonas aeruginosa (Carbapenem Resistant)      Clean Catch Clean Catch (Midstream)  09-26-23   10,000 - 49,000 CFU/mL Candida albicans "Susceptibilities not performed"  --  --      .Blood Blood-Peripheral  09-26-23   No growth at 5 days  --  --      .Blood Blood-Venous  09-20-23   No growth at 5 days  --  --      .Blood Blood-Peripheral  09-20-23   No growth at 5 days  --  --      Ear Ear  09-13-23   Moderate Candida albicans "Susceptibilities not performed"  --  --      .Blood Blood-Venous  09-10-23   No growth at 5 days  --  --      .Sputum Sputum  09-10-23   Normal Respiratory Cate present  --    Rare polymorphonuclear leukocytes per low power field  No Squamous epithelial cells per low power field  Rare Yeast like cells seen per oil power field  Rare Gram Positive Cocci in Clusters seen per oil power field      .Blood Blood-Peripheral  09-10-23   No growth at 5 days  --  --      .Sputum Sputum  09-08-23   Normal Respiratory Cate present  --    Numerous polymorphonuclear leukocytes per low power field  Numerous Squamous epithelial cells per low power field  Few Yeast like cells per oil power field  Results consistent with oropharyngeal contamination      .Blood Blood-Peripheral  09-08-23   No growth at 5 days  --  --      Ear Ear  09-06-23   No growth at 5 days  --  --      .Blood Blood-Peripheral  09-04-23   No growth at 5 days  --  --                RADIOLOGY:  Images independently visualized and reviewed personally, findings as below  < from: CT Head No Cont (10.03.23 @ 20:46) >    No acute intracranial hemorrhage or mass effect. Evaluation of the   posterior fossa degraded by streak artifact from dental amalgam.   Lucencies in the bilateral cerebellum may be artifactual.    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    Bilateral middle ear and mastoid effusions which are also seen on prior   exam.    < end of copied text >  < from: TTE W or WO Ultrasound Enhancing Agent (10.01.23 @ 10:07) >  CONCLUSIONS:      1. Left ventricular systolic function is normal with an ejection fraction visually estimated at 60 to 65 %.   2. The right ventricle is not well visualized.   3. Although there is no evidence of endocarditis on this study, the valves are not visualized well. Suggest STEPHANIE if clinically appropriate.    < end of copied text >

## 2023-10-04 NOTE — PROGRESS NOTE ADULT - SUBJECTIVE AND OBJECTIVE BOX
Subjective: Patient seen and examined. No new events except as noted.     REVIEW OF SYSTEMS:    unable to obtain    MEDICATIONS:  MEDICATIONS  (STANDING):  albuterol/ipratropium for Nebulization 3 milliLiter(s) Nebulizer every 6 hours  apixaban 5 milliGRAM(s) Oral two times a day  artificial tears (preservative free) Ophthalmic Solution 1 Drop(s) Both EYES every 4 hours  aspirin  chewable 81 milliGRAM(s) Enteral Tube daily  atorvastatin 10 milliGRAM(s) Oral at bedtime  aztreonam  IVPB 2000 milliGRAM(s) IV Intermittent <User Schedule>  chlorhexidine 0.12% Liquid 15 milliLiter(s) Oral Mucosa every 12 hours  chlorhexidine 2% Cloths 1 Application(s) Topical daily  dextrose 5%. 1000 milliLiter(s) (50 mL/Hr) IV Continuous <Continuous>  dextrose 5%. 1000 milliLiter(s) (100 mL/Hr) IV Continuous <Continuous>  dextrose 50% Injectable 12.5 Gram(s) IV Push once  dextrose 50% Injectable 25 Gram(s) IV Push once  dextrose 50% Injectable 25 Gram(s) IV Push once  epoetin odalis (EPOGEN) Injectable 51877 Unit(s) IV Push <User Schedule>  ferrous    sulfate Liquid 300 milliGRAM(s) Oral daily  glucagon  Injectable 1 milliGRAM(s) IntraMuscular once  insulin lispro (ADMELOG) corrective regimen sliding scale   SubCutaneous every 6 hours  insulin NPH human recombinant 11 Unit(s) SubCutaneous every 6 hours  midodrine. 30 milliGRAM(s) Oral every 8 hours  pantoprazole  Injectable 40 milliGRAM(s) IV Push two times a day  petrolatum Ophthalmic Ointment 1 Application(s) Both EYES two times a day  sodium chloride 2 Gram(s) Oral two times a day  sodium chloride 3%  Inhalation 4 milliLiter(s) Inhalation every 6 hours  valACYclovir 500 milliGRAM(s) Oral every 24 hours      PHYSICAL EXAM:  T(C): 37.2 (10-04-23 @ 13:31), Max: 37.7 (10-03-23 @ 21:10)  HR: 108 (10-04-23 @ 15:36) (98 - 124)  BP: 99/55 (10-04-23 @ 13:31) (99/55 - 128/41)  RR: 20 (10-04-23 @ 13:31) (20 - 22)  SpO2: 100% (10-04-23 @ 15:36) (95% - 100%)  Wt(kg): --  I&O's Summary    03 Oct 2023 07:01  -  04 Oct 2023 07:00  --------------------------------------------------------  IN: 1260 mL / OUT: 0 mL / NET: 1260 mL          Appearance: NAD, +trach  HEENT: dry oral mucosa  Lymphatic: No lymphadenopathy  Cardiovascular: Normal S1 S2, No JVD, No murmurs, No edema  Respiratory: Decreased BS, + trach to vent	  Neuro: opens eyes  Gastrointestinal: Soft, Non-tender, + BS, + NGT  Skin: No rashes, No ecchymoses, No cyanosis	  Extremities: No strength/ROM 2/2 sedation, + BL LE edema  Vascular: Peripheral pulses palpable 2+ bilaterally  Mode: AC/ CMV (Assist Control/ Continuous Mandatory Ventilation), RR (machine): 22, TV (machine): 340, FiO2: 35, PEEP: 8, ITime: 0.81, MAP: 20, PIP: 55            LABS:    CARDIAC MARKERS:                                9.2    14.93 )-----------( 397      ( 04 Oct 2023 05:50 )             29.0     10-04    137  |  98  |  51<H>  ----------------------------<  198<H>  3.7   |  29  |  1.77<H>    Ca    8.8      04 Oct 2023 05:50  Phos  2.6     10-04  Mg     2.10     10-04    TPro  6.0  /  Alb  2.4<L>  /  TBili  0.3  /  DBili  x   /  AST  46<H>  /  ALT  28  /  AlkPhos  345<H>  10-04    proBNP:   Lipid Profile:   HgA1c:   TSH:             TELEMETRY: 	    ECG:  	  RADIOLOGY:   DIAGNOSTIC TESTING:  [ ] Echocardiogram:  [ ]  Catheterization:  [ ] Stress Test:    OTHER:

## 2023-10-04 NOTE — CONSULT NOTE ADULT - ASSESSMENT
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 While in MICU patient noted with worsening renal failure requiring HD initiation, CGE/ Melena s/p EGD 8/31 found with esophagitis and erosive gastropathy, new right cerebellar infarct and fevers second to ESBL COLI and MSSA VAP, MSSA bacteremia, left ear otitis externa and drug rxn to cephalosporins. Course further complicated by prolonged vent time s/p tracheostomy 9/1 and transferred to RCU 9/3 completed by recurrent fevers, high PIPs with hypervolemia, mucous plug and tracheomalacia with dynamic collapse, progressive left ear OM, and left eye conjunctivitis? Case discussed at length renal and attempted renal recovery however failed and R IJ SHILEY failed. Attempted volume removal with Bumex GTT however course complicated by hypotension requiring transfer back to MICU 9/26. Diuresis dc'ed, pressors weaned off and stress steroids started. Called Optho and left eye with possible uveitits and Ofloxacin drops, Valcyte PO, Erythromycin drops, and artificial tears/ lacrilube continued. L IJ SHILEY placed (9/30) and HD reinstated with good volume removal. Course further complicated by AOCD and  PSA and Proteus VAP and Bradford and DAPTO continued. Patient transferred back to RCU 10/1. Palliative consulted for complex decision making regarding goc in setting of multiple comorbidities c/b prolonged hospital course.

## 2023-10-04 NOTE — CONSULT NOTE ADULT - PROBLEM SELECTOR RECOMMENDATION 2
[FreeTextEntry1] : 13 y.o female presents to telehealth \par Immunizations reviewed\par Discuss risks and benefits of Influenza vaccine and HPV immunization\par Counseling/education provided on health maintenance and promotion during pandemic \par Emotional support provided \par Offered addition services such as counseling, declined for now \par Follow up as needed, contact info provided \par 
hypotensive requiring pressors  continue to monitor  management as per ICU
Pt on intermittent HD   Appreciate nephrology recs   Pt on midodrine

## 2023-10-04 NOTE — PROGRESS NOTE ADULT - ASSESSMENT
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 While in MICU patient noted with worsening renal failure requiring HD initiation, CGE/ Melena s/p EGD 8/31 found with esophagitis and erosive gastropathy, new right cerebellar infarct and fevers second to ESBL COLI and MSSA VAP, MSSA bacteremia, left ear otitis externa and drug rxn to cephalosporins Course further complicated by prolonged vent time s/p tracheostomy 9/1 and transferred to RCU 9/3 completed by recurrent fevers with high PIP, respiratory acidosis and concern for volume overloaded.   CTH repeated 9/26 for concern for encephalopathy - has known now - chronic bilateral cerebellar infarcts, L PCA infarct. L parietal hypodensity seen on prior CT likely artifactual  Repeat CTH 10/3 - unchanged    Impression:   Metabolic encephalopathy related to shock, infection, doubt new stroke  L PCA stroke, ESUS s/p ILR, also with bilateral centrum semiovale watershed infarcts   Multiple embolic strokes on MRI 8/17 - R cerebellum, midbrain, multiple other tiny embolic infarcts.   Dementia   MSSA bacteremia, r/o endocarditis s/p Daptomycin  Acute popliteal DVT on Eliquis   Anemia     Recommendations   [] WBC elevated, Zosyn changed to Aztreonam  [] New CTH from 9/26 and 10/3 without any evidence of acute infarct -> encephalopathy likely related to underlying metabolic derangements.  [] GI following for coffee ground emesis - esophagitis seen on colonoscopy, on ELiquis  [] family declined kidney biopsy for AIN -> s/p steroid tx   [] C/w home ASA 81 mg if no contraindications   [] hep gtt transitioned to ELiquis -> ?consider holding and IVC filter given anemia   [] C/w home Atorvastatin 10 mg qhs   [] would interrogate ILR and review tele to see if pAF -. no AF seen  [] DVT/GI ppx - hep gtt   [] Neurochecks q4hr   [] BP goals: Normotension - on pressors  [] PT/OT when able   [] HgA1C goals < 7.0   [] continue to address GOC with family as pts  and daughter continue to remain hopeful    d/w     Katty Charles DO  Vascular Neurology  Office [phone]

## 2023-10-04 NOTE — PROGRESS NOTE ADULT - ASSESSMENT
74 y/o F well known to me from my [facility] outpt practice. she was admitted at [facility] 7/12-7/22 w aspiration PNA, was treated w CEFEPIME, developed an allergic rash,  dCHF, + MAC on AFB culture, had been progressively getting more and more lethargic and dyspneic at home since DC.   In  am of 8/11/23  ptn presented with respiratory distress w hypoxia and hypercarbia requiring intubation 2/2 volume overload +/- Asp PNA      Neuro   responds appropriately   Baseline MS AOx3, aphasic   - h/o CVA , on aspirin and statin . resumed w feeding tube, ASA resumed 8/26  - eeg  2/2 tremors, no sz focus  - less responsive in the past few days  - MRI 8/17:  new R cerebellar infarct, old left PCA/Occipital infarct. 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 Mcnulty, now in MICU  s/p septic shock overnight 9/1, now on meropenem, HDS  s/p trache, on vent support, copious secretions      Renal     Ptn well know to Dr. Colon,following   HD 8/19,  8/21, 8/26 and 8/28.  s/p PUF 8/29 2.5 Liters, HD 8/30,  9/1, 9/4  L IJ HD placed  uremic  hyponatremic  HD as per renal        Hypotension  - Levo drip  prognosis is poor  consider palliative care consult    GI  NPO  on tube feeds  coffee ground emesis x 1 8/24, melena overnight 8/31, s/p 1UPRBC, EGD/Colonoscopy: erosive gastropathy, esophagisits, on colonoscopy old blood seen no active bleeding, poor prep  family to decide re PEG placement    Endocrine  T2DM   A1C 7.1 in 7/2023   - BG goal 140-200     ID   No fever/leukocytosis, recheck temp   Hx latent TB which was treated, no concern for TB   - grew MAC on AFB culture from most recent admission. at [facility]  -MSSA Bacteremia 9/1, allergic to cephalosprins and PCN, on Vanco as per ID, renal dosing,   - sputum also grew E.Coli-ESBL, as per ID recs for  Bradford through 9/7( 1 week course as per ID) , afebrile.  - 9/8:  spike  temp 38.1C, has O. externa, has a wick, recs are to get a CT to R/O an abscess/bony involvement. cont Meropenem  9/9: ptn w fever overnight to 100.8,  may need shiley pulled if bacteremic, needs NECK CT as per ENT to R/O Mastoid bone involvement while having ongoing purulent DC from L ear 2/2 O. externa, getting daily wick changes  9/10:  spiked 102 overnight through Bradford and Vanco, purulent DC from O. externa, ENT recommend Ct of mastoid. ptn in HD, has Shiley in place, consider pulling shiley and send for Cx. would d/w Renal, and consider contacting  ID, last seen by Dr. Conner 9/6 9/11: remains febrile, Dr. Conner reconsulted. cont Bradford, Vanco  9/12: tmax 100.5, fever curve is improving, awaiting Left ear CT, on Bradford since 9/1. on  vanco for MSSA Bacteremia, does not tolerate cephalosporins. through 10/2  9/13: on full vent support via trache, appears uncomfortable and onur 2/2 Left O. externa. has an incidental left middle ear tumor, no acute middle ear interventions recommended, left ear wick replaced. on Meropenem, cx from Ear DC is neg. On Vanco for MSSA bacteremia through 10/2, course of Meropenem as per ID, afebrile in the past 24 hrs . Cardura for HTN resumed, HGB dropped to 6.4, got a dose of EPO and 1UPRBC, rpt 7.8, remains on HEPARIN drip for LEFT popliteal DVT  9/14: cont on Mupirocin locally to Left ear, cont IV Bradford, ENT signed off, afebrile x 48 hrs, Mro course to be determined by ID, on Vanco for MSSA bacteremia through 10/2. ongoing worsening hyponatremia, Hypertonic saline as per renal, no HD today, s/p 1UPRBC 9/13  9/15: spiking temps again, if cont to spike as per ID re PAN scan. cont Vanco for MSSA Bacteremia  9/16-18: no temp overnight  afebrile, cont to have Left ear DC,   on Vanco and ,bradford through 10/2  On IV Fluconazole for fungal cx+ from O. externa Discharge.   fluconazole started 9/21, ptn developed an allergic rash on 9/22. doubt its 2/2 to MEROPENEM or Vanco.  MEropenem was DCed 2/2 causing possible drug rash.  on VANCO based on levels for MSSA bacteremia but DCed 2/2 poss drug rash, now on Daptomycin  9/29: ptn is septic, meropenem resumed, s/p HD 9/28, next HD tomorrow    Heme/Onc  ppx: place on SCD  Transfuse for hgb 6.4, no obv bleed. HDS  LEFT POPLITEAL VEIN ACUTE DVT on 9/10, on Heparin drip    Ethics  GOC - Discussed GOC with daughter and , they have opted in the past for full code. and she remains full code at present      9/27:  In [facility], R IJ HD catheter placed, NGT in place, acidotic on VBG, trache to vent, anasarca, on IV BUMEX, on Levo, on Heparin drip, on stress dose HYdrocortisone, FS in range, uremic, awaiting HD, CT C/A/P w no acute findings. VANCO Dced , now on Dapto, for MSSA baceteremia through 10/2    9/28: HD started 9/27, still encephalopathic, fluconazole DCed as it could be the cause of the rash. on Dapto to complete a course of Abx for MSSA bacteremia through 10/2, VANCO DCed 2/2 possible rash. off pressors    9/30: sepsis resolved, off fluconazole, would add FLUCONAZOLE to the allergy list. on Meropenem, on Dapto through 10/2 instead of Vanco.Vanco was DCed as preseumed ptn had an allergic rash to Vanco. she is not allergic to Vanco. vent dependent. tolerating HD. s/p HD today, next on 10/3    10/1: back in RCU, trach to vent,  tolerating HD well, on Bradford, Dapto, off fluconazole 2/2 allergic rxn    10/2:  trache to vent, completed a course of ABx for MSSA bacteremia TODAY, sputum grew P. aeruginosa resistant to MEROPENEM, ptn was switched to ZOSYN. 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 copious secretions. temp last night. ptn got 2 gm dose of AZACTAM today at 6 pm, ptn seen at 8 pm, has facial swelling and erythema, no stridor, ID made aware. Daughter and  at bedside state the allergic rash post ZOsyn had improved after DC zosyn. last dose of ZOsyn today at 2 pm, prior to that 11 am.   may need to switch to Amikacin if reaction will cont on Azactam. cont Benadryl. rpt Head ct tonight to r/o new CVA. HD as per renal    10/4: HD today, as per renal. no fever overnight, remains on AZTREONAM, rash resolved, next dose tonight

## 2023-10-04 NOTE — CONSULT NOTE ADULT - PROBLEM SELECTOR RECOMMENDATION 3
Pt bedbound, s/p trach, with NGT in place.   > During my encounter, patient without mental status. Metabolic encephalopathy related to shock, infection.   > Patient pending peg tube placement if condition stabilizes.   >Wound care recs appreciated
RISS

## 2023-10-04 NOTE — CHART NOTE - NSCHARTNOTEFT_GEN_A_CORE
EEG preliminary read (not final) on the initial recording hour(s) = x 2    No seizures recorded.  Abundant left posterior quadrant discharges, at times periodic at 1hz frequency.   Moderate slowing noted, nonspecific.    Final report to follow tomorrow morning after completion of study.    [facility] EEG Reading Room Ph#: [phone]  Epilepsy Answering Service after 5PM and before 8:30AM: Ph#: [phone]

## 2023-10-04 NOTE — ADVANCED PRACTICE NURSE CONSULT - RECOMMEDATIONS
Continue orders as per ENT for left ear.     Topical recommendations:    Nasogastric Tube: Cleanse nare with NS. Pat dry. Apply Liquid barrier film to periwound skin. Apply Stat-lock NGT wiley over center of nare, not touch any skin circumferentially. Change every three days or prn if soiled or compromised.     Bilateral neck, breast folds, abdominal pannus: Cleanse with soap and water, pat dry. Apply Interdry textile sheeting, under intertriginous folds leaving 2 inches exposed at ends to wick, remove to wash & dry affected area, then replace. Individual sheeting may be used for up to 5 days unless soiled. Inspect skin daily.    RLQ Abdomen: Cleanse with NS, pat dry. Apply Liquid barrier film to periwound skin (allow to dry). Apply silicone foam with borders. Change daily AND PRN if soiled.     Sacral/gluteal fold: continue with TRIAD paste twice a day and PRN with incontinent episodes. With episodes of incontinence only remove soiled layer of Triad, then reinforce with thin layer.     Continue low air loss bed therapy, heel elevation with offloading boots, turn & reposition q2h with Z-flow fluidized pillow, continue moisture management with barrier creams as specified above & single breathable pad, continue measures to decrease friction/shear.     Plan discussed with patient's  at bedside and daughter over phone, all questions answered.    Please contact Wound/Ostomy Care Service Line if we can be of further assistance (ext [phone]). Please reconsult if changes to tissue type noted.

## 2023-10-04 NOTE — PROGRESS NOTE ADULT - ASSESSMENT
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    (2)RUSS - hemodynamic RUSS as of late last week - s/p HD 9/27, 9/28, 9/30 and 10/2    (3)Hyponatremia - improved, with HD; now off salt tabs    (4)Pulm- vent-dependent    (5)ID - s/p IV abx - dosed for low GFR    (6)Anemia - s/p PRBCs overnight, warranting DAVID with HD    (6)CV - tenuous hemodynamics - off pressor gtts/on Midodrine    (7)Hypophosphatemia - improved s/p repletion yesterday    RECOMMEND:  (1)HD today - 3h, 2kg UF as able, pressors as needed to keep SBP>90; Epogen with HD  (2)Dose new meds for GFR 10-15ml/min    Nilda Espinoza DNP  [facility]  [phone]        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    (2)RUSS - hemodynamic RUSS as of late last week - s/p HD 9/27, 9/28, 9/30 and 10/2    (3)Hyponatremia - improved, with HD; now off salt tabs    (4)Pulm- vent-dependent    (5)ID - s/p IV abx - dosed for low GFR    (6)Anemia - s/p PRBCs overnight, warranting DAVID with HD    (6)CV - tenuous hemodynamics - off pressor gtts/on Midodrine    (7)Hypophosphatemia - improved s/p repletion yesterday    RECOMMEND:  (1)HD today - 3h, 2kg UF as able, pressors as needed to keep SBP>90; Epogen with HD  (2)Dose new meds for GFR 10-15ml/min    Nilda Espinoza DNP  [facility]  [phone]       RENAL ATTENDING NOTE  Patient seen and examined with NP. Agree with assessment and plan as above.    Jimmy Colon MD  [facility]  [phone]

## 2023-10-04 NOTE — ADVANCED PRACTICE NURSE CONSULT - REASON FOR CONSULT
Patient known to [facility] service line last seen on 9/28/23, Patient seen on skin care rounds after wound care referral received for assessment of skin impairment and recommendations of topical management of IAD/MAD. As per H&P, patient is a 73 f with DM, HTN, CKD, breast CA, latent TB (treated first with INH then had rash and switched to rifampin), MSSA bacteremia, c-diff, respiratory arrest and cardiac arrest (2018), s/p trach/PEG then removed, CVA with residual weakness, aspiration PNA, 1/4 sputums had MAC 7/2023, admitted 8/11 with respiratory failure no fever or WBC but was intubated and then spiked blood cx negative, sputum cx with MSSA    Chart reviewed: WBC: 14.93, H&H: 9.2/29.0, Platelets: 397, INR: <0.70, A1C: 7.1, BMI: 27.7, alesia 12.

## 2023-10-04 NOTE — PROGRESS NOTE ADULT - SUBJECTIVE AND OBJECTIVE BOX
NEPHROLOGY     Patient seen and examined with  at bedside, trach to vent, in no acute distress.     MEDICATIONS  (STANDING):  albuterol/ipratropium for Nebulization 3 milliLiter(s) Nebulizer every 6 hours  apixaban 5 milliGRAM(s) Oral two times a day  artificial tears (preservative free) Ophthalmic Solution 1 Drop(s) Both EYES every 4 hours  aspirin  chewable 81 milliGRAM(s) Enteral Tube daily  atorvastatin 10 milliGRAM(s) Oral at bedtime  chlorhexidine 0.12% Liquid 15 milliLiter(s) Oral Mucosa every 12 hours  chlorhexidine 2% Cloths 1 Application(s) Topical daily  dextrose 5%. 1000 milliLiter(s) (50 mL/Hr) IV Continuous <Continuous>  dextrose 5%. 1000 milliLiter(s) (100 mL/Hr) IV Continuous <Continuous>  dextrose 50% Injectable 12.5 Gram(s) IV Push once  dextrose 50% Injectable 25 Gram(s) IV Push once  dextrose 50% Injectable 25 Gram(s) IV Push once  epoetin odalis (EPOGEN) Injectable 95664 Unit(s) IV Push <User Schedule>  ferrous    sulfate Liquid 300 milliGRAM(s) Oral daily  glucagon  Injectable 1 milliGRAM(s) IntraMuscular once  insulin lispro (ADMELOG) corrective regimen sliding scale   SubCutaneous every 6 hours  insulin NPH human recombinant 11 Unit(s) SubCutaneous every 6 hours  midodrine. 30 milliGRAM(s) Oral every 8 hours  pantoprazole  Injectable 40 milliGRAM(s) IV Push two times a day  petrolatum Ophthalmic Ointment 1 Application(s) Both EYES two times a day  sodium chloride 2 Gram(s) Oral two times a day  sodium chloride 3%  Inhalation 4 milliLiter(s) Inhalation every 6 hours  valACYclovir 500 milliGRAM(s) Oral every 24 hours    VITALS:  T(C): , Max: 37.7 (10-03-23 @ 21:10)  T(F): , Max: 99.9 (10-03-23 @ 21:10)  HR: 122 (10-04-23 @ 07:45)  BP: 105/45 (10-04-23 @ 05:37)  RR: 22 (10-04-23 @ 05:37)  SpO2: 96% (10-04-23 @ 07:45)    I and O's:    10-03 @ 07:01  -  10-04 @ 07:00  --------------------------------------------------------  IN: 1260 mL / OUT: 0 mL / NET: 1260 mL    PHYSICAL EXAM:  Constitutional: minimally communicative at present; on vent  HEENT: trach-vent, (+)NGT  Respiratory: coarse BS b/l  Cardiovascular: tachy reg s1s2  Gastrointestinal: BS+, soft, NT/ND  Extremities: + peripheral edema  Neurological: tone WNL  : (+) Wheeler  Skin: No rashes  Access: (+)Microinox    LABS:                        9.2    14.93 )-----------( 397      ( 04 Oct 2023 05:50 )             29.0     10-04    137  |  98  |  51<H>  ----------------------------<  198<H>  3.7   |  29  |  1.77<H>    Ca    8.8      04 Oct 2023 05:50  Phos  2.6     10-04  Mg     2.10     10-04    TPro  6.0  /  Alb  2.4<L>  /  TBili  0.3  /  DBili  x   /  AST  46<H>  /  ALT  28  /  AlkPhos  345<H>  10-04    RADIOLOGY & ADDITIONAL STUDIES:  < from: CT Head No Cont (10.03.23 @ 20:46) >  IMPRESSION:    No acute intracranial hemorrhage or mass effect. Evaluation of the   posterior fossa degraded by streak artifact from dental amalgam.   Lucencies in the bilateral cerebellum may be artifactual.    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    Bilateral middle ear and mastoid effusions which are also seen on prior   exam.    --- End of Report ---    < end of copied text >

## 2023-10-04 NOTE — PROGRESS NOTE ADULT - PROBLEM SELECTOR PLAN 1
1. Otitis externa is fully resolved. Discontinue all drops, acetic acid and bacitracin. Reconsult ENT as needed.

## 2023-10-04 NOTE — CONSULT NOTE ADULT - CONVERSATION DETAILS
Referral for complex decision making and symptom management in setting of advanced illness c/b prolonged hospital course. Introduced role of GaP team to spouse at bedside. Attempted to review patient's clinical course, however spouse stated that he did not want his wife to hear. We stepped outside in the hallway and he stated he spoke to the RCU team yesterday but did not share his insight, just stated that he "wants to stay hopeful". Though he believes that medical teams are doing everything they can for his wife, he believes that patient will recover and states that he is "getting positive news today". Explained that medical teams will continue to have open and honest discussions with him and his family regarding patient's medical condition and that intent of any discussion is not to take away hope or miracles. Shared my concern that patient has had a prolonged hospital course including new trach, HD, declining mental status, etc and that it would be important to have ongoing discussions regarding goals of care for his wife if the interventions that we are providing are not helping her. He stated that patient had "double cardiac arrest and stroke 5 years ago, requiring trach and peg" and feels that patient can overcome those odds again. He was open to having ongoing discussions when his daughter is present. palliative provider left contact information should he want to continue discussions with palliative team.

## 2023-10-04 NOTE — CONSULT NOTE ADULT - PROBLEM SELECTOR RECOMMENDATION 5
Thank you for allowing us to participate in your patient's care. We will continue to follow with you. Please page [phone] for any q's or c's. The Geriatric and Palliative Medicine service has coverage 24 hours a day/ 7 days a week to provide medical recommendations regarding symptom management needs via telephone.    Bambi Mcintyre D.O.   Palliative Medicine

## 2023-10-04 NOTE — CONSULT NOTE ADULT - PROBLEM SELECTOR PROBLEM 1
Acute respiratory failure with hypoxia and hypercapnia
Otitis externa, tropical
Acute respiratory failure with hypoxia and hypercapnia

## 2023-10-04 NOTE — CONSULT NOTE ADULT - SUBJECTIVE AND OBJECTIVE BOX
[facility]  Bambi Mcintyre, Palliative Care Attending  Contact Info: Page [phone] (including Nights/Weekends), message on Microsoft Teams (Bambi Mcintyre), or leave  at Palliative Office [phone] (non-urgent)     Date of [id number] @ 13:10  HPI:  72 y/o F DM on insulin, HTN, CKD,breast CA, respiratory arrest and cardiac arrest (2018), CVA with residual weakness, aspiration PNA h/o trache, PEG, both removed presents with respiratory distress requiring intubation. Had recent admission d/c 7/22/23 for cough and respiratory distress (no intubation) thought to be i/s/o aspiration PNA s/p cefepime course; developed rash in response. Infectious w/u was negative at this time. Was discharged home, daughter and  at bedside providing history.     Reports that she was initially improving with O2 sats in the high 90s on room air, however, then became more lethargic and not herself (baseline mental status AOx3). Also had worsening shortness of breath while laying down and leg swelling. Contacted cardiologist who started her on bumex 1mg daily. Developed low sugars overnight before admission and was given juice with some food, daughter reports no coughing during episode. At around 4-5 AM developed vomiting and coughing, O2 sats dropped to 80s and EMS was called. Denies fevers/chills, dysuria or urinary frequency, chest pain, palpitations. Uses wheelchair at home however family moves her around frequently.     In ED, was on BiPAP with increased WOB and was intubated. Found to have elevated pro-BNP and CO2 of 111 on VBG. CXR with small right pleural effusion, started on vanc in the ED.  (11 Aug 2023 09:08)    PERTINENT PM/SXH:   Breast CA  Diabetes  Stroke  Cardiac arrest  HTN (hypertension)    No significant past surgical history  H/O mastectomy, bilateral    FAMILY HISTORY:  No pertinent family history in first degree relatives    Family Hx substance abuse [ ]yes [ ]no  ITEMS NOT CHECKED ARE NOT PRESENT    SOCIAL HISTORY:   Significant other/partner[x ]  Children[x- 2 daughters (1 in [place], 1 here in [place]) 2 grandsons in 10th and 12th grade]  Druze/Spirituality:  Substance hx:  [ ]   Tobacco hx:  [ ]   Alcohol hx: [ ]   Home Opioid hx: NONE [ ] I-Stop Reference No: [id number]  Living Situation: [ x]Home  [ ]Long term care  [ ]Rehab [ ]Other    ADVANCE DIRECTIVES:  FULL CODE    DNR/MOLST  [ ]  Living Will  [ ]   DECISION MAKER(s):   [ ] Health Care Proxy(s)  [ x] Surrogate(s)  [ ] Guardian           Name(s): David (spouse) Phone Number(s): [phone]    BASELINE (I)ADL(s) (prior to admission):   [place]: [ ]Total  [ ] Moderate [ x]Dependent    Allergies    isoniazid (Rash)  nafcillin (Unknown)  hydrALAZINE (Rash)  vitamin E (Short breath; Urticaria; Hives)  doxycycline (Rash)  cefepime (Rash)  NIFEdipine (Urticaria; Hives)    Intolerances    MEDICATIONS  (STANDING):  albuterol/ipratropium for Nebulization 3 milliLiter(s) Nebulizer every 6 hours  apixaban 5 milliGRAM(s) Oral two times a day  artificial tears (preservative free) Ophthalmic Solution 1 Drop(s) Both EYES every 4 hours  aspirin  chewable 81 milliGRAM(s) Enteral Tube daily  atorvastatin 10 milliGRAM(s) Oral at bedtime  chlorhexidine 0.12% Liquid 15 milliLiter(s) Oral Mucosa every 12 hours  chlorhexidine 2% Cloths 1 Application(s) Topical daily  dextrose 5%. 1000 milliLiter(s) (100 mL/Hr) IV Continuous <Continuous>  dextrose 5%. 1000 milliLiter(s) (50 mL/Hr) IV Continuous <Continuous>  dextrose 50% Injectable 12.5 Gram(s) IV Push once  dextrose 50% Injectable 25 Gram(s) IV Push once  dextrose 50% Injectable 25 Gram(s) IV Push once  epoetin odalis (EPOGEN) Injectable 05530 Unit(s) IV Push <User Schedule>  ferrous    sulfate Liquid 300 milliGRAM(s) Oral daily  glucagon  Injectable 1 milliGRAM(s) IntraMuscular once  insulin lispro (ADMELOG) corrective regimen sliding scale   SubCutaneous every 6 hours  insulin NPH human recombinant 11 Unit(s) SubCutaneous every 6 hours  midodrine. 30 milliGRAM(s) Oral every 8 hours  pantoprazole  Injectable 40 milliGRAM(s) IV Push two times a day  petrolatum Ophthalmic Ointment 1 Application(s) Both EYES two times a day  sodium chloride 2 Gram(s) Oral two times a day  sodium chloride 3%  Inhalation 4 milliLiter(s) Inhalation every 6 hours  valACYclovir 500 milliGRAM(s) Oral every 24 hours    MEDICATIONS  (PRN):  acetaminophen   Oral Liquid .. 650 milliGRAM(s) Oral every 6 hours PRN Temp greater or equal to 38C (100.4F), Mild Pain (1 - 3)  calamine/zinc oxide Lotion 1 Application(s) Topical two times a day PRN Rash and/or Itching  dextrose Oral Gel 15 Gram(s) Oral once PRN Blood Glucose LESS THAN 70 milliGRAM(s)/deciliter  sodium chloride 0.9% lock flush 10 milliLiter(s) IV Push every 1 hour PRN Pre/post blood products, medications, blood draw, and to maintain line patency    PRESENT SYMPTOMS: [x ]Unable to self-report  [ ] CPOT [ x] PAINADs [x ] RDOS  Source if other than patient:  [x ]Family   [x ]Team     Pain: [ ]yes [ ]no  QOL impact -   Location -                    Aggravating factors -  Quality -  Radiation -  Timing-  Severity (0-10 scale):  Minimal acceptable level/pain goal (0-10 scale):     CPOT:    https://www.[facility].org/getattachment/[email or web address]/Critical-Care-Pain-Observation-Tool-(CPOT)    Dyspnea:                           [ ]Mild [ ]Moderate [ ]Severe  Anxiety:                             [ ]Mild [ ]Moderate [ ]Severe  Fatigue:                             [ ]Mild [ ]Moderate [ ]Severe  Nausea:                             [ ]Mild [ ]Moderate [ ]Severe  Loss of appetite:              [ ]Mild [ ]Moderate [ ]Severe  Constipation:                    [ ]Mild [ ]Moderate [ ]Severe    Other Symptoms:  [ ]All other review of systems negative     PCSSQ[Palliative Care Spiritual Screening Question]   Severity (0-10):  Chaplaincy Referral: [ ] yes [ ] refused [ ] following [x ] deferred     Caregiver [place]? : [ ] yes [ ] no [x ] Deferred [ ] Declined             Social work referral [ ] Patient & Family Centered Care Referral [ ]  Anticipatory Grief present?:  [ ] yes [ ] no  [x ] Deferred                  Social work referral [ ] Patient & Family Centered Care Referral [ ]    PHYSICAL EXAM:  Vital Signs Last 24 Hrs  T(C): 37.2 (04 Oct 2023 04:00), Max: 37.7 (03 Oct 2023 21:10)  T(F): 99 (04 Oct 2023 04:00), Max: 99.9 (03 Oct 2023 21:10)  HR: 111 (04 Oct 2023 11:49) (98 - 124)  BP: 105/45 (04 Oct 2023 05:37) (105/45 - 128/41)  BP(mean): --  RR: 22 (04 Oct 2023 05:37) (20 - 22)  SpO2: 100% (04 Oct 2023 11:49) (95% - 100%)    Parameters below as of 04 Oct 2023 05:37  Patient On (Oxygen Delivery Method): ventilator, AC    O2 Concentration (%): 40 I&O's Summary    03 Oct 2023 07:01  -  04 Oct 2023 07:00  --------------------------------------------------------  IN: 1260 mL / OUT: 0 mL / NET: 1260 mL      GENERAL: [ ]Cachexia    [ ]Alert  [ ]Oriented x   [ ]Lethargic  [x ]Unarousable  [ ]Verbal  [ x]Non-Verbal  Behavioral:   [ ] Anxiety  [ ] Delirium [ ] Agitation [x ] Other  HEENT:   [ ]Normal   [ ]Dry mouth   [ x]ET Tube/Trach  [ ]Oral lesions  PULMONARY:   [ ]Clear [ ]Tachypnea  [ ]Audible excessive secretions   [ ]Rhonchi        [ ]Right [ ]Left [ ]Bilateral  [ ]Crackles        [ ]Right [ ]Left [ ]Bilateral  [ ]Wheezing     [ ]Right [ ]Left [ ]Bilateral  [ x]Diminished breath sounds [ ]right [ ]left [ x]bilateral  CARDIOVASCULAR:    [ ]Regular [ ]Irregular [ x]Tachy  [ ]Barry [ ]Murmur [ ]Other  GASTROINTESTINAL:  [ ]Soft  [ ]Distended   [ ]+BS  [ ]Non tender [ ]Tender  [ ]Other [ ]PEG [x ]OGT/ NGT  Last BM: 10/4  GENITOURINARY:  [ ]Normal [ ] Incontinent   [x ]Oliguria/Anuria   [ ]Wheeler  MUSCULOSKELETAL:   [ ]Normal   [ ]Weakness  [x ]Bed/Wheelchair bound [ ]Edema  NEUROLOGIC:   [ ]No focal deficits  [ ]Cognitive impairment  [ ]Dysphagia [ ]Dysarthria [ ]Paresis [ x]Other   SKIN: Please see flowsheets - inner left ear, stage 2 sacrum   [ ]Normal  [ ]Rash  [ ]Other  [ x]Pressure ulcer(s)       Present on admission [ ]y [ ]n    CRITICAL CARE:  [ ] Shock Present  [ ]Septic [ ]Cardiogenic [ ]Neurologic [ ]Hypovolemic  [ ]  Vasopressors [ ]  Inotropes   [ ]Respiratory failure present [ ]Mechanical ventilation [ ]Non-invasive ventilatory support [ ]High flow  Mode: AC/ CMV (Assist Control/ Continuous Mandatory Ventilation), RR (machine): 22, TV (machine): 340, FiO2: 40, PEEP: 8, ITime: 0.54, MAP: 17, PIP: 47  [ ]Acute  [ ]Chronic [ ]Hypoxic  [ ]Hypercarbic [ ]Other  [ ]Other organ failure     LABS:                        9.2    14.93 )-----------( 397      ( 04 Oct 2023 05:50 )             29.0   10-04    137  |  98  |  51<H>  ----------------------------<  198<H>  3.7   |  29  |  1.77<H>    Ca    8.8      04 Oct 2023 05:50  Phos  2.6     10-04  Mg     2.10     10-04    TPro  6.0  /  Alb  2.4<L>  /  TBili  0.3  /  DBili  x   /  AST  46<H>  /  ALT  28  /  AlkPhos  345<H>  10-04      Urinalysis Basic - ( 04 Oct 2023 05:50 )    Color: x / Appearance: x / SG: x / pH: x  Gluc: 198 mg/dL / Ketone: x  / Bili: x / Urobili: x   Blood: x / Protein: x / Nitrite: x   Leuk Esterase: x / RBC: x / WBC x   Sq Epi: x / Non Sq Epi: x / Bacteria: x      RADIOLOGY & ADDITIONAL STUDIES: < from: CT Head No Cont (10.03.23 @ 20:46) >  IMPRESSION:    No acute intracranial hemorrhage or mass effect. Evaluation of the   posterior fossa degraded by streak artifact from dental amalgam.   Lucencies in the bilateral cerebellum may be artifactual.    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    Bilateral middle ear and mastoid effusions which are also seen on prior   exam.    < end of copied text >      PROTEIN CALORIE MALNUTRITION PRESENT: [ ]mild [ ]moderate [ ]severe [ ]underweight [ ]morbid obesity  https://www.andeal.org/vault/2440/web/files/ONC/Table_Clinical%20Characteristics%20to%20Document%20Malnutrition-White%20JV%20et%20al%202012.pdf    Height (cm): 154.9 (08-11-23 @ 13:15), 152.4 (07-12-23 @ 11:29)  Weight (kg): 66.5 (08-11-23 @ 13:15), 59 (07-12-23 @ 11:29)  BMI (kg/m2): 27.7 (08-11-23 @ 13:15), 25.4 (07-12-23 @ 11:29)    [ x]PPSV2 < or = to 30% [ ]significant weight loss  [ ]poor nutritional intake  [ ]anasarca[x ]Artificial Nutrition      Other REFERRALS:  [ ]Hospice  [ ]Child Life  [ ]Social Work  [ ]Case management [ ]Holistic Therapy

## 2023-10-04 NOTE — PROGRESS NOTE ADULT - ASSESSMENT
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 family declined renal biopsy started on prednisone  s/p trach 9/1 and BAL with MSSA   ET cx with ESBL and MSSA  started on ana cristina 9/1  blood cx 9/1: MSSA   repeat negative 9/4, 9/8  CXR with b/l opacities, R increased  L ear edema and otitis externa, the last cx showed candida and ENT stated it could be fungal (saw black spores?)    initial resp failure for ?fluid ovrer load but also had MSSA in the sputum, got vanco, aztreonam one day then cefepime and had rash, renal failure which was considered due to cefepime and then received 2 days of cefazolin and then off, now with trach and bronc on 9/1 with MSSA bacteremia and BAL also MSSA,   another ET cx with MSSA and ESBL E-coli  hypotension, MSSA bacteremia likely due to pneumonia, repeat blood cx negative 9/4, TTE limited unable to exclude endocarditis, s/p a 4 week course of vanco then dapto through 10/2  L otitis externa on ana cristina since 9/1 but worsening edema and black discoloration with bullae forming and persistent fever (ear cx was negative)  Ct showed acute on chronic otitis externa and otomastoiditis , superimposed cholesteatoma can not be excluded, no malignant otitis externa  pt again febrile, ENT stated there was black spores which could be a fungal infection and the last cx showed candida albicans s/p 7 days of fluconazole   new erythematous patchy rash, did not improved after discontinuing the ana cristina so vanco switched to dapto but still with rash and it also started after pt was started on fluconazole so not sure which caused it  also hypotensive now and ?uveitis vs endophthalmitis, head CT with acute/subacute ischemia and old occipital infarct, chest/abd CT with unchanged  RUL consolidation, decreased BEVERLY consolidation  pt uremic as well, s/p shiley and resumed on HD 9/27  s/p bronc with excessive dynamic airway collapse s/p trach change and some improvement  pt febrile and tachy on 9/29, sputum cx with carbapenem resistant pseudomonas (S to zosyn) and proteus    * ana cristina was switched to zosyn 10/1, again with rash 10/2, fever and increasing WBC eos, so switched to aztreonam 10/3, now the rash is gone but there was concern that there was new facial erythema last night, aztreonam less likely to cause reaction it could be lingering effect from zosyn will continue aztreonam for now and monitor closely  * ENT stated the otitis external completely resolved  * monitor CBC/diff and renal function  * f/u with ophthalmology, on valtrex as per ophthalmology     The above assessment and plan was discussed with the primary team    Yumiko Conner MD  contact on teams  After 5pm and on weekends call [phone]       73 f with DM, HTN, CKD, breast CA, latent TB (treated first with INH then had rash and switched to rifampin), MSSA bacteremia, c-diff, respiratory arrest and cardiac arrest (2018), s/p trach/PEG then removed, CVA with residual weakness, aspiration PNA, 1/4 sputums had MAC 7/2023, admitted 8/11 with respiratory failure no fever or WBC but was intubated and then spiked  blood cx negative, sputum cx with MSSA  s/p vanco and aztreonam 8/11=> s/p cefepime 8/12 and had ?rash => s/p cefazolin 8/13-8/14  head MRI 8/17 with acute cerebellar infarct  had renal failure, AIN? family declined renal biopsy started on prednisone  s/p trach 9/1 and BAL with MSSA   ET cx with ESBL and MSSA  started on ana cristina 9/1  blood cx 9/1: MSSA   repeat negative 9/4, 9/8  CXR with b/l opacities, R increased  L ear edema and otitis externa, the last cx showed candida and ENT stated it could be fungal (saw black spores?)    initial resp failure for ?fluid ovrer load but also had MSSA in the sputum, got vanco, aztreonam one day then cefepime and had rash, renal failure which was considered due to cefepime and then received 2 days of cefazolin and then off, now with trach and bronc on 9/1 with MSSA bacteremia and BAL also MSSA,   another ET cx with MSSA and ESBL E-coli  hypotension, MSSA bacteremia likely due to pneumonia, repeat blood cx negative 9/4, TTE limited unable to exclude endocarditis, s/p a 4 week course of vanco then dapto through 10/2  L otitis externa on ana cristina since 9/1 but worsening edema and black discoloration with bullae forming and persistent fever (ear cx was negative)  Ct showed acute on chronic otitis externa and otomastoiditis , superimposed cholesteatoma can not be excluded, no malignant otitis externa  pt again febrile, ENT stated there was black spores which could be a fungal infection and the last cx showed candida albicans s/p 7 days of fluconazole   new erythematous patchy rash, did not improved after discontinuing the ana cristina so vanco switched to dapto but still with rash and it also started after pt was started on fluconazole so not sure which caused it  also hypotensive now and ?uveitis vs endophthalmitis, head CT with acute/subacute ischemia and old occipital infarct, chest/abd CT with unchanged  RUL consolidation, decreased BEVERLY consolidation  pt uremic as well, s/p shiley and resumed on HD 9/27  s/p bronc with excessive dynamic airway collapse s/p trach change and some improvement  pt febrile and tachy on 9/29, sputum cx with carbapenem resistant pseudomonas (S to zosyn) and proteus    * ana cristina was switched to zosyn 10/1, again with rash 10/2, fever and increasing WBC eos, so switched to aztreonam 10/3, now the rash is gone but there was concern that there was new facial erythema last night, aztreonam less likely to cause reaction it could be lingering effect from zosyn will continue aztreonam for now and monitor closely, but if another reaction will have to switch to amikacin  * ENT stated the otitis external completely resolved  * monitor CBC/diff and renal function  * f/u with ophthalmology, on valtrex as per ophthalmology     The above assessment and plan was discussed with the primary team    Yumiko Conner MD  contact on teams  After 5pm and on weekends call [phone]       73 f with DM, HTN, CKD, breast CA, latent TB (treated first with INH then had rash and switched to rifampin), MSSA bacteremia, c-diff, respiratory arrest and cardiac arrest (2018), s/p trach/PEG then removed, CVA with residual weakness, aspiration PNA, 1/4 sputums had MAC 7/2023, admitted 8/11 with respiratory failure no fever or WBC but was intubated and then spiked  blood cx negative, sputum cx with MSSA  s/p vanco and aztreonam 8/11=> s/p cefepime 8/12 and had ?rash => s/p cefazolin 8/13-8/14  head MRI 8/17 with acute cerebellar infarct  had renal failure, AIN? family declined renal biopsy started on prednisone  s/p trach 9/1 and BAL with MSSA   ET cx with ESBL and MSSA  started on ana cristina 9/1  blood cx 9/1: MSSA   repeat negative 9/4, 9/8  CXR with b/l opacities, R increased  L ear edema and otitis externa, the last cx showed candida and ENT stated it could be fungal (saw black spores?)    initial resp failure for ?fluid ovrer load but also had MSSA in the sputum, got vanco, aztreonam one day then cefepime and had rash, renal failure which was considered due to cefepime and then received 2 days of cefazolin and then off, now with trach and bronc on 9/1 with MSSA bacteremia and BAL also MSSA,   another ET cx with MSSA and ESBL E-coli  hypotension, MSSA bacteremia likely due to pneumonia, repeat blood cx negative 9/4, TTE limited unable to exclude endocarditis, s/p a 4 week course of vanco then dapto through 10/2  L otitis externa on ana cirstina since 9/1 but worsening edema and black discoloration with bullae forming and persistent fever (ear cx was negative)  Ct showed acute on chronic otitis externa and otomastoiditis , superimposed cholesteatoma can not be excluded, no malignant otitis externa  pt again febrile, ENT stated there was black spores which could be a fungal infection and the last cx showed candida albicans s/p 7 days of fluconazole   new erythematous patchy rash, did not improved after discontinuing the ana cristina so vanco switched to dapto but still with rash and it also started after pt was started on fluconazole so not sure which caused it  also hypotensive now and ?uveitis vs endophthalmitis, head CT with acute/subacute ischemia and old occipital infarct, chest/abd CT with unchanged  RUL consolidation, decreased BEVERLY consolidation  pt uremic as well, s/p shiley and resumed on HD 9/27  s/p bronc with excessive dynamic airway collapse s/p trach change and some improvement  pt febrile and tachy on 9/29, sputum cx with carbapenem resistant pseudomonas (S to zosyn) and proteus    * ana cristina was switched to zosyn 10/1, again with rash 10/2, fever and increasing WBC eos, so switched to aztreonam 10/3, now the rash is gone but there was concern that there was new facial erythema last night, aztreonam less likely to cause reaction it could be lingering effect from zosyn will continue aztreonam for now and monitor closely, but if another reaction will have to switch to amikacin 350 once and then redose if level<8  * ENT stated the otitis external completely resolved  * monitor CBC/diff and renal function  * f/u with ophthalmology, on valtrex as per ophthalmology     The above assessment and plan was discussed with the primary team    Yumiko Conner MD  contact on teams  After 5pm and on weekends call [phone]

## 2023-10-04 NOTE — ADVANCED PRACTICE NURSE CONSULT - ASSESSMENT
General: A&Ox0, FiO2 @40%, bedbound, obese, R NG tube, center of nare, no evidence of skin breakdown or discoloration, incontinent of urine and stool. Upon assessment, incontinence care of loose brown stool performed. Skin warm, dry with increased moisture in intertriginous folds, scattered areas of hyperpigmentation and hypopigmentation, scattered areas of ecchymosis on bilateral upper extremities. Blanchable erythema on bilateral heels. Stable scab noted to superior L ear removed with gentle cleansing, exposing 100% healed skin underneath.    Gluteal cleft and bilateral buttocks: Incontinence and moisture associated dermatitis as evidenced by fissure formation in gluteal cleft, not visible or over peterson prominence in natural anatomical laying position, denuded epidermis to R buttocks exposing 100% red, moist dermis. Periwound with hyperpigmentation circumferentially. No induration, no erythema, no crepitus, no fluctuance, no edema, no temperature changes, no overt signs of infection noted. Goals of care: Prevent from moisture, friction, shearing, pressure.     RLQ Abdomen: De-roofed blister 2/2 localized edema measuring [id number] exposing 100% red moist dermis. Periwound intact. No induration, no fluctuance, no crepitus, no erythema, no edema, no temperature changes, no overt signs of infection noted.

## 2023-10-04 NOTE — PROGRESS NOTE ADULT - SUBJECTIVE AND OBJECTIVE BOX
S: No overnight events. Pt seen. Zosyn switched to Aztreonam for c/f facial flushing. Repeat CTH unchanged        Medications: MEDICATIONS  (STANDING):  albuterol/ipratropium for Nebulization 3 milliLiter(s) Nebulizer every 6 hours  apixaban 5 milliGRAM(s) Oral two times a day  artificial tears (preservative free) Ophthalmic Solution 1 Drop(s) Both EYES every 4 hours  aspirin  chewable 81 milliGRAM(s) Enteral Tube daily  atorvastatin 10 milliGRAM(s) Oral at bedtime  chlorhexidine 0.12% Liquid 15 milliLiter(s) Oral Mucosa every 12 hours  chlorhexidine 2% Cloths 1 Application(s) Topical daily  dextrose 5%. 1000 milliLiter(s) (50 mL/Hr) IV Continuous <Continuous>  dextrose 5%. 1000 milliLiter(s) (100 mL/Hr) IV Continuous <Continuous>  dextrose 50% Injectable 12.5 Gram(s) IV Push once  dextrose 50% Injectable 25 Gram(s) IV Push once  dextrose 50% Injectable 25 Gram(s) IV Push once  epoetin odalis (EPOGEN) Injectable 38294 Unit(s) IV Push <User Schedule>  ferrous    sulfate Liquid 300 milliGRAM(s) Oral daily  glucagon  Injectable 1 milliGRAM(s) IntraMuscular once  insulin lispro (ADMELOG) corrective regimen sliding scale   SubCutaneous every 6 hours  insulin NPH human recombinant 11 Unit(s) SubCutaneous every 6 hours  midodrine. 30 milliGRAM(s) Oral every 8 hours  pantoprazole  Injectable 40 milliGRAM(s) IV Push two times a day  petrolatum Ophthalmic Ointment 1 Application(s) Both EYES two times a day  sodium chloride 2 Gram(s) Oral two times a day  sodium chloride 3%  Inhalation 4 milliLiter(s) Inhalation every 6 hours  valACYclovir 500 milliGRAM(s) Oral every 24 hours    MEDICATIONS  (PRN):  acetaminophen   Oral Liquid .. 650 milliGRAM(s) Oral every 6 hours PRN Temp greater or equal to 38C (100.4F), Mild Pain (1 - 3)  calamine/zinc oxide Lotion 1 Application(s) Topical two times a day PRN Rash and/or Itching  dextrose Oral Gel 15 Gram(s) Oral once PRN Blood Glucose LESS THAN 70 milliGRAM(s)/deciliter  sodium chloride 0.9% lock flush 10 milliLiter(s) IV Push every 1 hour PRN Pre/post blood products, medications, blood draw, and to maintain line patency       Vitals:  Vital Signs Last 24 Hrs  T(C): 37.2 (04 Oct 2023 04:00), Max: 37.7 (03 Oct 2023 21:10)  T(F): 99 (04 Oct 2023 04:00), Max: 99.9 (03 Oct 2023 21:10)  HR: 111 (04 Oct 2023 11:49) (98 - 124)  BP: 105/45 (04 Oct 2023 05:37) (105/45 - 128/41)  BP(mean): --  RR: 22 (04 Oct 2023 05:37) (20 - 22)  SpO2: 100% (04 Oct 2023 11:49) (95% - 100%)    Parameters below as of 04 Oct 2023 05:37  Patient On (Oxygen Delivery Method): ventilator, AC    O2 Concentration (%): 40      Neurological Exam:  Mental Status: Eyes closed, off sedation. Does not follow commands,  + trach to vent and NGT   Cranial Nerves: PERRL slightly sluggish, L subconjunctival hemorrhage  Motor: Moves upper extremities spontaneously, tremor R > L  no movement noted in lowers  Sensation: WD to noxious x4    I personally reviewed the below data/images/labs:         LABS:                          9.5    14.21 )-----------( 373      ( 03 Oct 2023 12:53 )             29.1     10-03    138  |  99  |  40<H>  ----------------------------<  141<H>  3.6   |  27  |  1.41<H>    Ca    8.4      03 Oct 2023 02:49  Phos  1.6     10-03  Mg     1.90     10-03          Urinalysis Basic - ( 03 Oct 2023 02:49 )    Color: x / Appearance: x / SG: x / pH: x  Gluc: 141 mg/dL / Ketone: x  / Bili: x / Urobili: x   Blood: x / Protein: x / Nitrite: x   Leuk Esterase: x / RBC: x / WBC x   Sq Epi: x / Non Sq Epi: x / Bacteria: x              < from: CT Head No Cont (08.15.23 @ 17:20) >    ACC: [id number] EXAM:  CT BRAIN   ORDERED BY: MINAL CECY     PROCEDURE DATE:  08/15/2023          INTERPRETATION:  Clinical indication: Change in neuro exam.    Multiple axial sections were performed from base to vertex without   contrast enhancement. Coronal and sagittal reconstructions were   reformatted well.    This exam is compared prior head CT performed on August 11, 2023    Parenchymal volume loss and chronic microvessel ischemic changes are   again seen.    Abnormal low-attenuation involving the left occipital cortical   subcortical region is again seen. 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    No evidence of acute hemorrhage mass or mass effect is seen.    Evaluation of the osseous structures with appropriate window demonstrates   sclerotic changes about the left mastoid region which appears stable.   Opacification left middle ear region is again seen.    Patient is status post bilateral cataract surgery.    Impression: Stable exam.    < end of copied text >    vEEG:    Clinical Impression:  - Severe diffuse non-specific cerebral dysfunction  - There were no epileptiform abnormalities or seizures recorded.        CTH 8/11:    VENTRICLES AND SULCI: Age-appropriate involutional change  INTRA-AXIAL:  Old left PCA infarct as seen on the prior unchanged.   Microvascular ischemic changes involving the periventricular and   subcortical white matter as seen previously  EXTRA-AXIAL:  No mass or collection is seen.  VISUALIZED SINUSES:  Clear.  VISUALIZED MASTOIDS: Left mastoid sclerosis  CALVARIUM: Infiltrative appearance tothe calvarium may be indicative of   marrow infiltration on the basis of patient's known diagnosis of breast   cancer. MISCELLANEOUS:  None.    IMPRESSION:  No significant interval change compared with 7/17/2023 in   left PCA infarct which is old. Microvascular ischemic changes involving   the periventricular and subcortical white matter as seen   previously.Questionable lesions at the level of the calvarium related to   possible breast CA. Clinical correlation recommended.    --- End of Report ---      ct< from: CT Head No Cont (09.26.23 @ 21:02) >  IMPRESSION: Vague questionable areas of hypoattenuation within the   bilateral cerebellar hemispheres which may be compatible with   acute/subacute ischemia. Recommend further evaluation with a brain MRI   study, provided there are no MRI contraindications.    No acute intracranial hemorrhage.    Previously seen questionable vague wedge-shaped area of hypoattenuation   in the left parietal lobe with artifactual secondary to motion and volume   averaging with a prominent sulcus.    Chronic left occipital lobe infarct.    < end of copied text >    < from: CT Head No Cont (10.03.23 @ 20:46) >    IMPRESSION:    No acute intracranial hemorrhage or mass effect. Evaluation of the   posterior fossa degraded by streak artifact from dental amalgam.   Lucencies in the bilateral cerebellum may be artifactual.    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    Bilateral middle ear and mastoid effusions which are also seen on prior   exam.    < end of copied text >

## 2023-10-04 NOTE — PROGRESS NOTE ADULT - SUBJECTIVE AND OBJECTIVE BOX
CHIEF COMPLAINT:Patient is a 73y old  Female who presents with a chief complaint of Respiratory distress (04 Oct 2023 08:08)      INTERVAL EVENTS:     ROS: Seen by bedside during AM rounds     OBJECTIVE:  ICU Vital Signs Last 24 Hrs  T(C): 37.2 (04 Oct 2023 04:00), Max: 37.7 (03 Oct 2023 21:10)  T(F): 99 (04 Oct 2023 04:00), Max: 99.9 (03 Oct 2023 21:10)  HR: 122 (04 Oct 2023 07:45) (98 - 124)  BP: 105/45 (04 Oct 2023 05:37) (105/45 - 128/41)  BP(mean): --  ABP: --  ABP(mean): --  RR: 22 (04 Oct 2023 05:37) (20 - 22)  SpO2: 96% (04 Oct 2023 07:45) (95% - 100%)    O2 Parameters below as of 04 Oct 2023 05:37  Patient On (Oxygen Delivery Method): ventilator, AC    O2 Concentration (%): 40      Mode: AC/ CMV (Assist Control/ Continuous Mandatory Ventilation), RR (machine): 22, TV (machine): 340, FiO2: 40, PEEP: 8, ITime: 0.54, MAP: 15, PIP: 43    10-03 @ 07:01  -  10-04 @ 07:00  --------------------------------------------------------  IN: 1260 mL / OUT: 0 mL / NET: 1260 mL      CAPILLARY BLOOD GLUCOSE      POCT Blood Glucose.: 190 mg/dL (04 Oct 2023 05:51)      PHYSICAL EXAM:  General:   HEENT:   Lymph Nodes:  Neck:   Respiratory:   Cardiovascular:   Abdomen:   Extremities:   Skin:   Neurological:  Psychiatry:    Mode: AC/ CMV (Assist Control/ Continuous Mandatory Ventilation)  RR (machine): 22  TV (machine): 340  FiO2: 40  PEEP: 8  ITime: 0.54  MAP: 15  PIP: 43      HOSPITAL MEDICATIONS:  MEDICATIONS  (STANDING):  albuterol/ipratropium for Nebulization 3 milliLiter(s) Nebulizer every 6 hours  apixaban 5 milliGRAM(s) Oral two times a day  artificial tears (preservative free) Ophthalmic Solution 1 Drop(s) Both EYES every 4 hours  aspirin  chewable 81 milliGRAM(s) Enteral Tube daily  atorvastatin 10 milliGRAM(s) Oral at bedtime  chlorhexidine 0.12% Liquid 15 milliLiter(s) Oral Mucosa every 12 hours  chlorhexidine 2% Cloths 1 Application(s) Topical daily  dextrose 5%. 1000 milliLiter(s) (100 mL/Hr) IV Continuous <Continuous>  dextrose 5%. 1000 milliLiter(s) (50 mL/Hr) IV Continuous <Continuous>  dextrose 50% Injectable 12.5 Gram(s) IV Push once  dextrose 50% Injectable 25 Gram(s) IV Push once  dextrose 50% Injectable 25 Gram(s) IV Push once  epoetin odalis (EPOGEN) Injectable 97194 Unit(s) IV Push <User Schedule>  ferrous    sulfate Liquid 300 milliGRAM(s) Oral daily  glucagon  Injectable 1 milliGRAM(s) IntraMuscular once  insulin lispro (ADMELOG) corrective regimen sliding scale   SubCutaneous every 6 hours  insulin NPH human recombinant 11 Unit(s) SubCutaneous every 6 hours  midodrine. 30 milliGRAM(s) Oral every 8 hours  pantoprazole  Injectable 40 milliGRAM(s) IV Push two times a day  petrolatum Ophthalmic Ointment 1 Application(s) Both EYES two times a day  sodium chloride 2 Gram(s) Oral two times a day  sodium chloride 3%  Inhalation 4 milliLiter(s) Inhalation every 6 hours  valACYclovir 500 milliGRAM(s) Oral every 24 hours    MEDICATIONS  (PRN):  acetaminophen   Oral Liquid .. 650 milliGRAM(s) Oral every 6 hours PRN Temp greater or equal to 38C (100.4F), Mild Pain (1 - 3)  calamine/zinc oxide Lotion 1 Application(s) Topical two times a day PRN Rash and/or Itching  dextrose Oral Gel 15 Gram(s) Oral once PRN Blood Glucose LESS THAN 70 milliGRAM(s)/deciliter  sodium chloride 0.9% lock flush 10 milliLiter(s) IV Push every 1 hour PRN Pre/post blood products, medications, blood draw, and to maintain line patency      LABS:                        9.2    14.93 )-----------( 397      ( 04 Oct 2023 05:50 )             29.0     10-04    137  |  98  |  51<H>  ----------------------------<  198<H>  3.7   |  29  |  1.77<H>    Ca    8.8      04 Oct 2023 05:50  Phos  2.6     10-04  Mg     2.10     10-04    TPro  6.0  /  Alb  2.4<L>  /  TBili  0.3  /  DBili  x   /  AST  46<H>  /  ALT  28  /  AlkPhos  345<H>  10-04      Urinalysis Basic - ( 04 Oct 2023 05:50 )    Color: x / Appearance: x / SG: x / pH: x  Gluc: 198 mg/dL / Ketone: x  / Bili: x / Urobili: x   Blood: x / Protein: x / Nitrite: x   Leuk Esterase: x / RBC: x / WBC x   Sq Epi: x / Non Sq Epi: x / Bacteria: x      Arterial Blood Gas:  10-03 @ 02:49  7.43/44/101/29/98.5/4.4  ABG lactate: --     CHIEF COMPLAINT:Patient is a 73y old  Female who presents with a chief complaint of Respiratory distress (04 Oct 2023 08:08)      INTERVAL EVENTS: Switched to Aztreonam yesterday due to rash thought likely from zosyn. Reported facial erythema with aztreonam but may be from residual effect of zosyn .Will reattempt aztreonam and monitor closely.     ROS: Unable to obtain ROS given patient is nonverbal in /so poor mentaiton.     OBJECTIVE:  ICU Vital Signs Last 24 Hrs  T(C): 37.2 (04 Oct 2023 04:00), Max: 37.7 (03 Oct 2023 21:10)  T(F): 99 (04 Oct 2023 04:00), Max: 99.9 (03 Oct 2023 21:10)  HR: 122 (04 Oct 2023 07:45) (98 - 124)  BP: 105/45 (04 Oct 2023 05:37) (105/45 - 128/41)  BP(mean): --  ABP: --  ABP(mean): --  RR: 22 (04 Oct 2023 05:37) (20 - 22)  SpO2: 96% (04 Oct 2023 07:45) (95% - 100%)    O2 Parameters below as of 04 Oct 2023 05:37  Patient On (Oxygen Delivery Method): ventilator, AC    O2 Concentration (%): 40      Mode: AC/ CMV (Assist Control/ Continuous Mandatory Ventilation), RR (machine): 22, TV (machine): 340, FiO2: 40, PEEP: 8, ITime: 0.54, MAP: 15, PIP: 43    10-03 @ 07:01  -  10-04 @ 07:00  --------------------------------------------------------  IN: 1260 mL / OUT: 0 mL / NET: 1260 mL      CAPILLARY BLOOD GLUCOSE      POCT Blood Glucose.: 190 mg/dL (04 Oct 2023 05:51)      PHYSICAL EXAM:  General: NAD   HEENT: (+)NGT in nare. (+)SHiley  Neck: (+) Trach tube noted, site c/d/i.  Cards: S1/S2, no murmurs   Pulm: CTA bilaterally. No wheezes.   Abdomen: Soft, (+)significant tense abd wall edema.  Extremities: Extremities warm to touch.  Neurology: Somnolent but rouses somewhat to tactile stimuli. Nonverbal. Not following commands.   Skin: warm to touch, color appropriate for ethnicity. Refer to RN assessment for further details.      Mode: AC/ CMV (Assist Control/ Continuous Mandatory Ventilation)  RR (machine): 22  TV (machine): 340  FiO2: 40  PEEP: 8  ITime: 0.54  MAP: 15  PIP: 43      HOSPITAL MEDICATIONS:  MEDICATIONS  (STANDING):  albuterol/ipratropium for Nebulization 3 milliLiter(s) Nebulizer every 6 hours  apixaban 5 milliGRAM(s) Oral two times a day  artificial tears (preservative free) Ophthalmic Solution 1 Drop(s) Both EYES every 4 hours  aspirin  chewable 81 milliGRAM(s) Enteral Tube daily  atorvastatin 10 milliGRAM(s) Oral at bedtime  chlorhexidine 0.12% Liquid 15 milliLiter(s) Oral Mucosa every 12 hours  chlorhexidine 2% Cloths 1 Application(s) Topical daily  dextrose 5%. 1000 milliLiter(s) (100 mL/Hr) IV Continuous <Continuous>  dextrose 5%. 1000 milliLiter(s) (50 mL/Hr) IV Continuous <Continuous>  dextrose 50% Injectable 12.5 Gram(s) IV Push once  dextrose 50% Injectable 25 Gram(s) IV Push once  dextrose 50% Injectable 25 Gram(s) IV Push once  epoetin odalis (EPOGEN) Injectable 12008 Unit(s) IV Push <User Schedule>  ferrous    sulfate Liquid 300 milliGRAM(s) Oral daily  glucagon  Injectable 1 milliGRAM(s) IntraMuscular once  insulin lispro (ADMELOG) corrective regimen sliding scale   SubCutaneous every 6 hours  insulin NPH human recombinant 11 Unit(s) SubCutaneous every 6 hours  midodrine. 30 milliGRAM(s) Oral every 8 hours  pantoprazole  Injectable 40 milliGRAM(s) IV Push two times a day  petrolatum Ophthalmic Ointment 1 Application(s) Both EYES two times a day  sodium chloride 2 Gram(s) Oral two times a day  sodium chloride 3%  Inhalation 4 milliLiter(s) Inhalation every 6 hours  valACYclovir 500 milliGRAM(s) Oral every 24 hours    MEDICATIONS  (PRN):  acetaminophen   Oral Liquid .. 650 milliGRAM(s) Oral every 6 hours PRN Temp greater or equal to 38C (100.4F), Mild Pain (1 - 3)  calamine/zinc oxide Lotion 1 Application(s) Topical two times a day PRN Rash and/or Itching  dextrose Oral Gel 15 Gram(s) Oral once PRN Blood Glucose LESS THAN 70 milliGRAM(s)/deciliter  sodium chloride 0.9% lock flush 10 milliLiter(s) IV Push every 1 hour PRN Pre/post blood products, medications, blood draw, and to maintain line patency      LABS:                        9.2    14.93 )-----------( 397      ( 04 Oct 2023 05:50 )             29.0     10-04    137  |  98  |  51<H>  ----------------------------<  198<H>  3.7   |  29  |  1.77<H>    Ca    8.8      04 Oct 2023 05:50  Phos  2.6     10-04  Mg     2.10     10-04    TPro  6.0  /  Alb  2.4<L>  /  TBili  0.3  /  DBili  x   /  AST  46<H>  /  ALT  28  /  AlkPhos  345<H>  10-04      Urinalysis Basic - ( 04 Oct 2023 05:50 )    Color: x / Appearance: x / SG: x / pH: x  Gluc: 198 mg/dL / Ketone: x  / Bili: x / Urobili: x   Blood: x / Protein: x / Nitrite: x   Leuk Esterase: x / RBC: x / WBC x   Sq Epi: x / Non Sq Epi: x / Bacteria: x      Arterial Blood Gas:  10-03 @ 02:49  7.43/44/101/29/98.5/4.4  ABG lactate: --

## 2023-10-04 NOTE — PROGRESS NOTE ADULT - NS ATTEND AMEND GEN_ALL_CORE FT
Discussed with patient's at bedside. Patient remains minimally responsive. Encephalopathy likely due to acute illnes + RUSS + sepsis + CVA's (MICU course complicated by new R cerebellar, midbrain, and multiple tiny embolic infarcts as well as known left PCA CVA). Repeat CT head on 10/3 with no acute changes. Ammonia normal. BUN improved. Check vEEG. Note that prior to current hospitalization her functional status was very limited, wheelchair-bound with bed sores and limited mobility, so I am concerned regarding her overall prognosis given her decline during current hospitalization. Continue dialysis with fluid removal per renal, due today. Improved anemia s/p 1 unit prbc's (in 1/2 units over 2 days). Epoetin per nephrology. No further fevers. Continue Aztreonam per ID for coverage of  Pseudomonas and Proteus. Follow-up repeat sputum culture. ENT follow-up for left ear otitis externa, which has now resolved. Follow-up ophthalmology regarding keratitis + chemosis, on Lacrilube and artificial tears. HD remains stable currently, on midodrine 30 mg q8h. On apixaban for DVT. Overall prognosis guarded, remains full code. Palliative care evaluation

## 2023-10-04 NOTE — PROGRESS NOTE ADULT - ASSESSMENT
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 While in MICU patient noted with worsening renal failure requiring HD initiation, CGE/ Melena s/p EGD 8/31 found with esophagitis and erosive gastropathy, new right cerebellar infarct and fevers second to ESBL COLI and MSSA VAP, MSSA bacteremia, left ear otitis externa and drug rxn to cephalosporins. Course further complicated by prolonged vent time s/p tracheostomy 9/1 and transferred to RCU 9/3 completed by recurrent fevers, high PIPs with hypervolemia, mucous plug and tracheomalacia with dynamic collapse, progressive left ear OM, and left eye conjunctivitis? Case discussed at length renal and attempted renal recovery however failed and R IJ SHILEY failed. Attempted volume removal with Bumex GTT however course complicated by hypotension requiring transfer back to MICU 9/26. Diuresis dc'ed, pressors weaned off and stress steroids started. Called Optho and left eye with possible uveitits and Ofloxacin drops, Valcyte PO, Erythromycin drops, and artificial tears/ lacrilube continued. L IJ SHILEY placed (9/30) and HD reinstated with good volume removal. Course further complicated by AOCD and  PSA and Proteus VAP and Bradford and DAPTO continued. Patient transferred back to RCU 10/1.    NEUROLOGY  # NEW cerebella infarct   - Baseline MS AOX3 with short term memory changes per family however noted with concern for poor mentation in ICU  - MRI (8/17) with NEW right cerebellar infarct and old left PCA/occipital infarcts  - STEHPANIE (8/17) with AV showing two linear mobile echogenic elements attached to valve leaflets consistent with Lambel's excrescence, but no TRINITY thrombus, and lambel's excrescence with uncertain clinical implication.   - EEG (8/11-8/15) with no seizures   - ILR interrogation (9/7) with SR and PACs falsely recorded as AF.   - Attempted to resume ASA for medical management but held given GIB   - Continue on lipitor for medical management   - Course complicated by questionable head and body shaking 9/8 however prolactin elevated. Discussed for spot EEG however held given low likelihood of seizures noted and EEG held.     # Metabolic vs Uremic Encephalopathy   - Lethargy noted with progression to stupor thought to be second to uremia.   - RPT CTH (9/26) with vague areas of hypoattenuation within the bilateral cerebellar hemispheres which may be compatible with acute/subacute ischemia.   - Discussed CTH with neurology and no signs of new infarct noted.   - HD reinstated in ICU with improvement in uremia however mentation remained   - Poor mentation likely in setting of infections.   - Monitor mentation closely     CARDIOVASCULAR  # HTN Urgency   # Septic vs vasoplegic shock   - Labile BPs ranging in between SBP 80s-200s and attempted labetalol, however noted with hypotension and bradycardia likely from BB toxicity vs shock state?    - Holding Labetalol, Catapres and Catapres.    - Attempted volume removal with bumex diuresis however noted with return of shock state requiring pressor support and transfer back to ICU.   - Pressors weaned off to stress dose and Midodrine   - Wean off Solucortef 50 TID (9/27-9/29) then Solucortef 25 TID (9/29-10/2) and now on Solucortef 25 QD (10/2 - 10/4) then DONE  - Continue on Midodrine 30 TID (9/29 - ) and wean as tolerated     # Hx of HFpEF with likely ADHF with volume overload.   - ECHO (7/2023) with EF 59 with severe LVH and diastolic dysfunction   - STEPHANIE (8/18) with normal LVRVSF however noted with increased LA pressures and persistent LA septal bowing into RA.   - ECHO (8/11) with EF 60 with mild LVH, normal LVRVSF, and mild ddfxn.  - Remove volume with HD sessions     HEENT   # Left ear OE   - ENT evaluation (9/7) with no mastoid tenderness, however found with positive swelling and excoriation to left auricle and tenderness to manipulation of auricle. Debrided crusting of auricle and EAC evaluated with edema and unable to visualize TM. EAC debrided and found with watery exudate.   - CT IAC with acute on chronic left-sided otitis externa and acute on chronic left-sided otomastoiditis. Superimposed left-sided tympanosclerosis. Superimposed cholesteatoma formation within the left tympanomastoid cavity cannot be excluded  - Continued on CiproHC (9/5 - 9/16) and Bradford (9/1-10/1)   - Case discussed with ENT at length and now continued on acetic acid drops BID to left ear and bactroban ointment to left pinna BID.   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   - Patient with tongue pain and seen with anterior ulcerations consolidating and crusting and posterior ulceration.  - Case discussed with pathology resident and culture/ cytology sent.   - HSV negative and Path (9/6) with normal appearing epithelial cells singly and in clusters devoid of viral cytopathic effect.   - Continue on magic mouth wash for pain    RESPIRATORY  # AHHRF second to volume overload   - Hx of CKD and HFpEF presented with SOB and hypercarbia second to volume overload requiring intubation and HD initiation   - Vent weaning attempted however limited requiring tracheostomy (9/1) with IP   - Course complicated by PIPs elevated and found with tracheomalacia.   - CT CHEST (9/8) with tracheomalacia, BL pleural effusions and continued consolidations   - Continue on vent and given TBM increased PEEP (9/9) to 20/300/8/40 with improvement   - Continue on duonebs for airway clearance   - SBT when mentation improves.      GI  # UGIB   - CGE x 1 episode on 8/24 and melena x 2 episodes on 8/31 likely residual blood.   - EGD (8/31) found with esophagitis and erosive gastropathy  - Continue on PPI BID through late October and then PPI QD     # Dysphagia   - NGT-TF continued   - Pending PEG however needs improvement prior to placement.     RENAL  # Progressive CKD   - Presented volume overload and progressive RUSS on CKD   - POCUS with no signs of hydronephrosis in ICU  - Pressor support started with improvement in renal function in ICU  - Attempted steroid course in ICU without improvement in renal function   - Case discussed at length with renal and initiated on HD in ICU   - Remain on HD while in RCU however noted to be volume overloaded. Attempted Bumex pushes and patient noted with good UOP.   - Attempted renal recovery in RCU however noted with decent UOP, but BUN/ CRE continued to rise without improvement on diuresis.   - Ultimately resumed on HD (9/27, 9/28, 9/30 and 10/2)   - Continue on HD with aggressive UF as tolerated per Renal    # Hyponatremia   - Continue on salt tabs 2G    - Monitor sodium     # Hyperphosphatemia to Hypophosphatemia with HD  - PTH normal and elevated phos likely from renal failure  - Phos binder with Renvela started with improvement however then noted with hypophosphatemia and Renvela stopped.     INFECTIOUS DISEASE  # ESBL ECOLI and MSSA VAP c/b MSSA bacteremia   - RVP/ COVID (8/11) negative   - SCx (8/11) with MSSA s/p cefepime (8/12-8/13) and then ancef (8/14) however noted with drug reaction and then changed to vanco and aztreonam (8/12-8/13) in ICU .   - Course complicated by recurrent fevers and return of MSSA with bacteremia.   - SCx (9/1) with MSSA and ESBL ECOLI   - BAL (9/1) with MSSA   - BCx (9/1) with MSSA and cleared on (9/4)   - ECHO (9/6) unable to rule out endocarditis   - Continue on Vanco (9/4-9/22) however noted with rashes of uncertain etiology and replaced with Bradford (9/4 - 10/2) and DAPTO (9/26-10/2) for  completion.     # Proteus and  PSA VAP/ Trachitis   - Continued fevers and SCx (9/29) with  PSA and Proteus   - Continued on Bradford as above however noted to be resistant and now changed to Zosyn (10/2 - ). MONITOR CLOSELY GIVEN DRUG RXN IN PAST!  - Rash developed with Zosyn overnight (10/2) and abx switched to Aztreonam (10/3 - ) based on cultures.   - Will repeat SCX (10/3)    # MRSA PCRs   - MRSA PCR (8/11 and 8/14) negative   - MRSA PCR (9/10) with MSSA and s/p bactroban in ICU   - MRSA PCR (9/26) with MSSA and s/p bactroban in ICU   - Next MRSA PCR (10/22)     HEME  # Anemia second to GIB vs renal disease   - s/p multiple units of PRBCs   - Anemia panel with AOCD but with low %sat and ferrous sulfate added to optimize   - Despite iron intermittent anemia noted without bleeding source and EPO added.   - HH dropped (10/2) and s/p 1U PRBC.   - S/P 1/2u PRBC (10/3) with good response.  - Monitor HH     VASCULAR   # LLE POP DVT   - US BL LE (9/10) with acute left deep vein thrombosis of the left popliteal vein.  - RUE swollen and pending VA DOPPLER.   - Continue on Eliquis     ONC   # Hx of Breast CA   - Patient dx in 2018 and s/p BL mastectomy (radical on right) and chemo and RT.   - Supportive care.     ENDOCRINE  # IDDM2   - Continue on NPH 3U and ISS   - Monitor FS and adjust as needed     LINES/ TUBES  - PACO TAYLOR (9/27 - )     SKIN  # Drug Eruption   - Attempted cefepime (8/12) vs ancef (8/13-8/14) and then noted with drug rxn.   - Hold further cephalosporins at this time   - Continue on steroids with prednisone 40 (8/18 - 9/2) then prednisone 30 (9/3-9/7), then prednisone 20 (9/8 - 9/10), then prednisone 10mg (9/11-9/13) then turn off.   - Monitor for further drug rxns.     ETHICS/ GOC    - FULL CODE     DISPO - TBD

## 2023-10-05 NOTE — PROGRESS NOTE ADULT - ASSESSMENT
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 family declined renal biopsy started on prednisone  s/p trach 9/1 and BAL with MSSA   ET cx with ESBL and MSSA  started on ana cristina 9/1  blood cx 9/1: MSSA   repeat negative 9/4, 9/8  CXR with b/l opacities, R increased  L ear edema and otitis externa, the last cx showed candida and ENT stated it could be fungal (saw black spores?)    initial resp failure for ?fluid ovrer load but also had MSSA in the sputum, got vanco, aztreonam one day then cefepime and had rash, renal failure which was considered due to cefepime and then received 2 days of cefazolin and then off, now with trach and bronc on 9/1 with MSSA bacteremia and BAL also MSSA,   another ET cx with MSSA and ESBL E-coli  hypotension, MSSA bacteremia likely due to pneumonia, repeat blood cx negative 9/4, TTE limited unable to exclude endocarditis, s/p a 4 week course of vanco then dapto through 10/2  L otitis externa on ana cristina since 9/1 but worsening edema and black discoloration with bullae forming and persistent fever (ear cx was negative)  Ct showed acute on chronic otitis externa and otomastoiditis , superimposed cholesteatoma can not be excluded, no malignant otitis externa  pt again febrile, ENT stated there was black spores which could be a fungal infection and the last cx showed candida albicans s/p 7 days of fluconazole   new erythematous patchy rash, did not improved after discontinuing the ana cristina so vanco switched to dapto but still with rash and it also started after pt was started on fluconazole so not sure which caused it  also hypotensive now and ?uveitis vs endophthalmitis, head CT with acute/subacute ischemia and old occipital infarct, chest/abd CT with unchanged  RUL consolidation, decreased BEVERLY consolidation  pt uremic as well, s/p shiley and resumed on HD 9/27  s/p bronc with excessive dynamic airway collapse s/p trach change and some improvement  pt febrile and tachy on 9/29, sputum cx with carbapenem resistant pseudomonas (S to zosyn) and proteus    * ana cristina was switched to zosyn 10/1, again with rash 10/2, fever and increasing WBC eos, so switched to aztreonam   *Concern for facial erythema yesterday, seems okay today  *continue aztreonam  * ENT stated the otitis external completely resolved  * monitor CBC/diff and renal function  * f/u with ophthalmology, on valtrex as per ophthalmology     The above assessment and plan was discussed with the primary team

## 2023-10-05 NOTE — GOALS OF CARE CONVERSATION - ADVANCED CARE PLANNING - CONVERSATION DETAILS
Palliative team met with patient's spouse around noon to follow up on goc discussions started yesterday. Mr. Barraza shared that he was able to spoke to his daughter yesterday regarding Palliative team met with patient's spouse around noon to follow up on [facility] discussions started yesterday. Mr. Barraza shared that he was able to speak to his daughter yesterday regarding our conversation yesterday. He stated that when his daughter was here with the patient last night, patient opened her eyes and was able to nod her head when the daughter asked if she wanted to sleep. He feels that patient needs more time to recover from acute illness and remains hopeful for recovery. Encouraged him to continue these discussions with his daughter and patient's primary medical team pending her hospital course. He has palliative care contact information should he want to speak to palliative care team.     At this time, palliative team signing off. Goals are clear. Please re-consult as needed.

## 2023-10-05 NOTE — PROGRESS NOTE ADULT - SUBJECTIVE AND OBJECTIVE BOX
CHIEF COMPLAINT:Patient is a 73y old  Female who presents with a chief complaint of Respiratory distress (04 Oct 2023 17:55)      INTERVAL EVENTS:     ROS: Seen by bedside during AM rounds     OBJECTIVE:  ICU Vital Signs Last 24 Hrs  T(C): 37 (05 Oct 2023 05:30), Max: 37.8 (04 Oct 2023 21:57)  T(F): 98.6 (05 Oct 2023 05:30), Max: 100 (04 Oct 2023 21:57)  HR: 105 (05 Oct 2023 05:30) (98 - 122)  BP: 123/40 (05 Oct 2023 05:30) (88/41 - 123/40)  BP(mean): --  ABP: --  ABP(mean): --  RR: 22 (05 Oct 2023 05:30) (20 - 22)  SpO2: 100% (05 Oct 2023 05:30) (96% - 100%)    O2 Parameters below as of 05 Oct 2023 05:30  Patient On (Oxygen Delivery Method): ventilator, AC    O2 Concentration (%): 40      Mode: AC/ CMV (Assist Control/ Continuous Mandatory Ventilation), RR (machine): 22, TV (machine): 340, FiO2: 40, PEEP: 8, ITime: 0.8, MAP: 15, PIP: 40    10-04 @ 07:01  -  10-05 @ 07:00  --------------------------------------------------------  IN: 1240 mL / OUT: 2400 mL / NET: -1160 mL      CAPILLARY BLOOD GLUCOSE      POCT Blood Glucose.: 145 mg/dL (05 Oct 2023 05:37)      PHYSICAL EXAM:  General:   HEENT:   Lymph Nodes:  Neck:   Respiratory:   Cardiovascular:   Abdomen:   Extremities:   Skin:   Neurological:  Psychiatry:    Mode: AC/ CMV (Assist Control/ Continuous Mandatory Ventilation)  RR (machine): 22  TV (machine): 340  FiO2: 40  PEEP: 8  ITime: 0.8  MAP: 15  PIP: 40      HOSPITAL MEDICATIONS:  MEDICATIONS  (STANDING):  albuterol/ipratropium for Nebulization 3 milliLiter(s) Nebulizer every 6 hours  apixaban 5 milliGRAM(s) Oral two times a day  artificial tears (preservative free) Ophthalmic Solution 1 Drop(s) Both EYES every 4 hours  aspirin  chewable 81 milliGRAM(s) Enteral Tube daily  atorvastatin 10 milliGRAM(s) Oral at bedtime  aztreonam  IVPB 2000 milliGRAM(s) IV Intermittent <User Schedule>  chlorhexidine 0.12% Liquid 15 milliLiter(s) Oral Mucosa every 12 hours  chlorhexidine 2% Cloths 1 Application(s) Topical daily  dextrose 5%. 1000 milliLiter(s) (50 mL/Hr) IV Continuous <Continuous>  dextrose 5%. 1000 milliLiter(s) (100 mL/Hr) IV Continuous <Continuous>  dextrose 50% Injectable 12.5 Gram(s) IV Push once  dextrose 50% Injectable 25 Gram(s) IV Push once  dextrose 50% Injectable 25 Gram(s) IV Push once  epoetin odalis (EPOGEN) Injectable 32282 Unit(s) IV Push <User Schedule>  ferrous    sulfate Liquid 300 milliGRAM(s) Oral daily  glucagon  Injectable 1 milliGRAM(s) IntraMuscular once  insulin lispro (ADMELOG) corrective regimen sliding scale   SubCutaneous every 6 hours  insulin NPH human recombinant 11 Unit(s) SubCutaneous every 6 hours  midodrine. 30 milliGRAM(s) Oral every 8 hours  pantoprazole  Injectable 40 milliGRAM(s) IV Push two times a day  petrolatum Ophthalmic Ointment 1 Application(s) Both EYES two times a day  sodium chloride 2 Gram(s) Oral two times a day  sodium chloride 3%  Inhalation 4 milliLiter(s) Inhalation every 6 hours  valACYclovir 500 milliGRAM(s) Oral every 24 hours    MEDICATIONS  (PRN):  acetaminophen   Oral Liquid .. 650 milliGRAM(s) Oral every 6 hours PRN Temp greater or equal to 38C (100.4F), Mild Pain (1 - 3)  calamine/zinc oxide Lotion 1 Application(s) Topical two times a day PRN Rash and/or Itching  dextrose Oral Gel 15 Gram(s) Oral once PRN Blood Glucose LESS THAN 70 milliGRAM(s)/deciliter  sodium chloride 0.9% lock flush 10 milliLiter(s) IV Push every 1 hour PRN Pre/post blood products, medications, blood draw, and to maintain line patency      LABS:                        9.3    12.58 )-----------( 399      ( 05 Oct 2023 05:43 )             29.3     10-04    137  |  98  |  51<H>  ----------------------------<  198<H>  3.7   |  29  |  1.77<H>    Ca    8.8      04 Oct 2023 05:50  Phos  2.6     10-04  Mg     2.10     10-04    TPro  6.0  /  Alb  2.4<L>  /  TBili  0.3  /  DBili  x   /  AST  46<H>  /  ALT  28  /  AlkPhos  345<H>  10-04      Urinalysis Basic - ( 04 Oct 2023 05:50 )    Color: x / Appearance: x / SG: x / pH: x  Gluc: 198 mg/dL / Ketone: x  / Bili: x / Urobili: x   Blood: x / Protein: x / Nitrite: x   Leuk Esterase: x / RBC: x / WBC x   Sq Epi: x / Non Sq Epi: x / Bacteria: x         CHIEF COMPLAINT:Patient is a 73y old  Female who presents with a chief complaint of Respiratory distress (04 Oct 2023 17:55)      INTERVAL EVENTS: overnight hypotensive responsive to midodrine. EEG ongoing, c/f poss seizure activity and d/w Neuro to start Keppra. Hypoglycemic this evening responsive to D50 push. SCx growing  PSA again, with intermediate sensitivity to Aztreonam.     ROS: Unable to obtain ROS given patient is nonverbal in s/o poor mentation.     OBJECTIVE:  ICU Vital Signs Last 24 Hrs  T(C): 37 (05 Oct 2023 05:30), Max: 37.8 (04 Oct 2023 21:57)  T(F): 98.6 (05 Oct 2023 05:30), Max: 100 (04 Oct 2023 21:57)  HR: 105 (05 Oct 2023 05:30) (98 - 122)  BP: 123/40 (05 Oct 2023 05:30) (88/41 - 123/40)  BP(mean): --  ABP: --  ABP(mean): --  RR: 22 (05 Oct 2023 05:30) (20 - 22)  SpO2: 100% (05 Oct 2023 05:30) (96% - 100%)    O2 Parameters below as of 05 Oct 2023 05:30  Patient On (Oxygen Delivery Method): ventilator, AC    O2 Concentration (%): 40      Mode: AC/ CMV (Assist Control/ Continuous Mandatory Ventilation), RR (machine): 22, TV (machine): 340, FiO2: 40, PEEP: 8, ITime: 0.8, MAP: 15, PIP: 40    10-04 @ 07:01  -  10-05 @ 07:00  --------------------------------------------------------  IN: 1240 mL / OUT: 2400 mL / NET: -1160 mL      CAPILLARY BLOOD GLUCOSE      POCT Blood Glucose.: 145 mg/dL (05 Oct 2023 05:37)      PHYSICAL EXAM:  General: NAD   HEENT: (+)NGT in nare  Neck: (+) Trach tube noted, site c/d/i. (+)SHiley cath  Cards: S1/S2, no murmurs   Pulm: CTA bilaterally. No wheezes.   Abdomen: Soft, NTND.   Extremities: No pedal edema. Extremities warm to touch.  Neurology: obtunded. Nonverbal. Not following commands.   Skin: warm to touch, color appropriate for ethnicity. Refer to RN assessment for further details.      Mode: AC/ CMV (Assist Control/ Continuous Mandatory Ventilation)  RR (machine): 22  TV (machine): 340  FiO2: 40  PEEP: 8  ITime: 0.8  MAP: 15  PIP: 40      HOSPITAL MEDICATIONS:  MEDICATIONS  (STANDING):  albuterol/ipratropium for Nebulization 3 milliLiter(s) Nebulizer every 6 hours  apixaban 5 milliGRAM(s) Oral two times a day  artificial tears (preservative free) Ophthalmic Solution 1 Drop(s) Both EYES every 4 hours  aspirin  chewable 81 milliGRAM(s) Enteral Tube daily  atorvastatin 10 milliGRAM(s) Oral at bedtime  aztreonam  IVPB 2000 milliGRAM(s) IV Intermittent <User Schedule>  chlorhexidine 0.12% Liquid 15 milliLiter(s) Oral Mucosa every 12 hours  chlorhexidine 2% Cloths 1 Application(s) Topical daily  dextrose 5%. 1000 milliLiter(s) (50 mL/Hr) IV Continuous <Continuous>  dextrose 5%. 1000 milliLiter(s) (100 mL/Hr) IV Continuous <Continuous>  dextrose 50% Injectable 12.5 Gram(s) IV Push once  dextrose 50% Injectable 25 Gram(s) IV Push once  dextrose 50% Injectable 25 Gram(s) IV Push once  epoetin odalis (EPOGEN) Injectable 56178 Unit(s) IV Push <User Schedule>  ferrous    sulfate Liquid 300 milliGRAM(s) Oral daily  glucagon  Injectable 1 milliGRAM(s) IntraMuscular once  insulin lispro (ADMELOG) corrective regimen sliding scale   SubCutaneous every 6 hours  insulin NPH human recombinant 11 Unit(s) SubCutaneous every 6 hours  midodrine. 30 milliGRAM(s) Oral every 8 hours  pantoprazole  Injectable 40 milliGRAM(s) IV Push two times a day  petrolatum Ophthalmic Ointment 1 Application(s) Both EYES two times a day  sodium chloride 2 Gram(s) Oral two times a day  sodium chloride 3%  Inhalation 4 milliLiter(s) Inhalation every 6 hours  valACYclovir 500 milliGRAM(s) Oral every 24 hours    MEDICATIONS  (PRN):  acetaminophen   Oral Liquid .. 650 milliGRAM(s) Oral every 6 hours PRN Temp greater or equal to 38C (100.4F), Mild Pain (1 - 3)  calamine/zinc oxide Lotion 1 Application(s) Topical two times a day PRN Rash and/or Itching  dextrose Oral Gel 15 Gram(s) Oral once PRN Blood Glucose LESS THAN 70 milliGRAM(s)/deciliter  sodium chloride 0.9% lock flush 10 milliLiter(s) IV Push every 1 hour PRN Pre/post blood products, medications, blood draw, and to maintain line patency      LABS:                        9.3    12.58 )-----------( 399      ( 05 Oct 2023 05:43 )             29.3     10-04    137  |  98  |  51<H>  ----------------------------<  198<H>  3.7   |  29  |  1.77<H>    Ca    8.8      04 Oct 2023 05:50  Phos  2.6     10-04  Mg     2.10     10-04    TPro  6.0  /  Alb  2.4<L>  /  TBili  0.3  /  DBili  x   /  AST  46<H>  /  ALT  28  /  AlkPhos  345<H>  10-04      Urinalysis Basic - ( 04 Oct 2023 05:50 )    Color: x / Appearance: x / SG: x / pH: x  Gluc: 198 mg/dL / Ketone: x  / Bili: x / Urobili: x   Blood: x / Protein: x / Nitrite: x   Leuk Esterase: x / RBC: x / WBC x   Sq Epi: x / Non Sq Epi: x / Bacteria: x

## 2023-10-05 NOTE — PROGRESS NOTE ADULT - ASSESSMENT
74 y/o F well known to me from my [facility] outpt practice. she was admitted at [facility] 7/12-7/22 w aspiration PNA, was treated w CEFEPIME, developed an allergic rash,  dCHF, + MAC on AFB culture, had been progressively getting more and more lethargic and dyspneic at home since DC.   In  am of 8/11/23  ptn presented with respiratory distress w hypoxia and hypercarbia requiring intubation 2/2 volume overload +/- Asp PNA      Neuro   responds appropriately   Baseline MS AOx3, aphasic   - h/o CVA , on aspirin and statin . resumed w feeding tube, ASA resumed 8/26  - eeg  2/2 tremors, no sz focus  - less responsive in the past few days  - MRI 8/17:  new R cerebellar infarct, old left PCA/Occipital infarct. 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 Mcnulty, now in MICU  s/p septic shock overnight 9/1, now on meropenem, HDS  s/p trache, on vent support, copious secretions      Renal     Ptn well know to Dr. Colon,following   HD 8/19,  8/21, 8/26 and 8/28.  s/p PUF 8/29 2.5 Liters, HD 8/30,  9/1, 9/4  L IJ HD placed  uremic  hyponatremic  HD as per renal        Hypotension  - Levo drip  prognosis is poor  consider palliative care consult    GI  NPO  on tube feeds  coffee ground emesis x 1 8/24, melena overnight 8/31, s/p 1UPRBC, EGD/Colonoscopy: erosive gastropathy, esophagisits, on colonoscopy old blood seen no active bleeding, poor prep  family to decide re PEG placement    Endocrine  T2DM   A1C 7.1 in 7/2023   - BG goal 140-200     ID   No fever/leukocytosis, recheck temp   Hx latent TB which was treated, no concern for TB   - grew MAC on AFB culture from most recent admission. at [facility]  -MSSA Bacteremia 9/1, allergic to cephalosprins and PCN, on Vanco as per ID, renal dosing,   - sputum also grew E.Coli-ESBL, as per ID recs for  Bradford through 9/7( 1 week course as per ID) , afebrile.  - 9/8:  spike  temp 38.1C, has O. externa, has a wick, recs are to get a CT to R/O an abscess/bony involvement. cont Meropenem  9/9: ptn w fever overnight to 100.8,  may need shiley pulled if bacteremic, needs NECK CT as per ENT to R/O Mastoid bone involvement while having ongoing purulent DC from L ear 2/2 O. externa, getting daily wick changes  9/10:  spiked 102 overnight through Bradford and Vanco, purulent DC from O. externa, ENT recommend Ct of mastoid. ptn in HD, has Shiley in place, consider pulling shiley and send for Cx. would d/w Renal, and consider contacting  ID, last seen by Dr. Conner 9/6 9/11: remains febrile, Dr. Conner reconsulted. cont Bradford, Vanco  9/12: tmax 100.5, fever curve is improving, awaiting Left ear CT, on Bradford since 9/1. on  vanco for MSSA Bacteremia, does not tolerate cephalosporins. through 10/2  9/13: on full vent support via trache, appears uncomfortable and onur 2/2 Left O. externa. has an incidental left middle ear tumor, no acute middle ear interventions recommended, left ear wick replaced. on Meropenem, cx from Ear DC is neg. On Vanco for MSSA bacteremia through 10/2, course of Meropenem as per ID, afebrile in the past 24 hrs . Cardura for HTN resumed, HGB dropped to 6.4, got a dose of EPO and 1UPRBC, rpt 7.8, remains on HEPARIN drip for LEFT popliteal DVT  9/14: cont on Mupirocin locally to Left ear, cont IV Bradford, ENT signed off, afebrile x 48 hrs, Mro course to be determined by ID, on Vanco for MSSA bacteremia through 10/2. ongoing worsening hyponatremia, Hypertonic saline as per renal, no HD today, s/p 1UPRBC 9/13  9/15: spiking temps again, if cont to spike as per ID re PAN scan. cont Vanco for MSSA Bacteremia  9/16-18: no temp overnight  afebrile, cont to have Left ear DC,   on Vanco and ,bradford through 10/2  On IV Fluconazole for fungal cx+ from O. externa Discharge.   fluconazole started 9/21, ptn developed an allergic rash on 9/22. doubt its 2/2 to MEROPENEM or Vanco.  MEropenem was DCed 2/2 causing possible drug rash.  on VANCO based on levels for MSSA bacteremia but DCed 2/2 poss drug rash, now on Daptomycin  9/29: ptn is septic, meropenem resumed, s/p HD 9/28, next HD tomorrow    Heme/Onc  ppx: place on SCD  Transfuse for hgb 6.4, no obv bleed. HDS  LEFT POPLITEAL VEIN ACUTE DVT on 9/10, on Heparin drip    Ethics  GOC - Discussed GOC with daughter and , they have opted in the past for full code. and she remains full code at present      9/27:  In [facility], R IJ HD catheter placed, NGT in place, acidotic on VBG, trache to vent, anasarca, on IV BUMEX, on Levo, on Heparin drip, on stress dose HYdrocortisone, FS in range, uremic, awaiting HD, CT C/A/P w no acute findings. VANCO Dced , now on Dapto, for MSSA baceteremia through 10/2    9/28: HD started 9/27, still encephalopathic, fluconazole DCed as it could be the cause of the rash. on Dapto to complete a course of Abx for MSSA bacteremia through 10/2, VANCO DCed 2/2 possible rash. off pressors    9/30: sepsis resolved, off fluconazole, would add FLUCONAZOLE to the allergy list. on Meropenem, on Dapto through 10/2 instead of Vanco.Vanco was DCed as preseumed ptn had an allergic rash to Vanco. she is not allergic to Vanco. vent dependent. tolerating HD. s/p HD today, next on 10/3    10/1: back in RCU, trach to vent,  tolerating HD well, on Bradford, Dapto, off fluconazole 2/2 allergic rxn    10/2:  trache to vent, completed a course of ABx for MSSA bacteremia TODAY, sputum grew P. aeruginosa resistant to MEROPENEM, ptn was switched to ZOSYN. 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 copious secretions. temp last night. ptn got 2 gm dose of AZACTAM today at 6 pm, ptn seen at 8 pm, has facial swelling and erythema, no stridor, ID made aware. Daughter and  at bedside state the allergic rash post ZOsyn had improved after DC zosyn. last dose of ZOsyn today at 2 pm, prior to that 11 am.   may need to switch to Amikacin if reaction will cont on Azactam. cont Benadryl. rpt Head ct tonight to r/o new CVA. HD as per renal    10/4: HD today, as per renal. no fever overnight, remains on AZTREONAM, rash resolved, next dose tonight    10/5: On aztreonam, afebrile, facial erythema is mild, prob resolving from ZOSYN exposure. on HD as per renal. trache to vent. O. externa resolved. 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

## 2023-10-05 NOTE — CONSULT NOTE ADULT - ASSESSMENT
Interventional Radiology    Evaluate for Procedure: tunneled HD catheter    HPI: 73y Female with DM on insulin, HTN, CKD, breast CA, respiratory arrest and cardiac arrest (2018), CVA with residual weakness, aspiration PNA h/o trach, PEG, with respiratory distress requiring intubation. Course c/b RUSS. Nontunneled HD catheter placed on the floor 9/27 with initiation of HD.    Allergies: isoniazid (Rash)  nafcillin (Unknown)  hydrALAZINE (Rash)  vitamin E (Short breath; Urticaria; Hives)  doxycycline (Rash)  cefepime (Rash)  NIFEdipine (Urticaria; Hives)    Medications (Abx/Cardiac/Anticoagulation/Blood Products)    apixaban: 10 milliGRAM(s) Oral (10-04 @ 05:54)  apixaban: 5 milliGRAM(s) Oral (10-05 @ 05:41)  aspirin  chewable: 81 milliGRAM(s) Enteral Tube (10-05 @ 11:59)  aztreonam  IVPB: 100 mL/Hr IV Intermittent (10-04 @ 17:14)  aztreonam  IVPB: 100 mL/Hr IV Intermittent (10-03 @ 18:20)  midodrine.: 30 milliGRAM(s) Oral (10-05 @ 01:59)  midodrine.: 30 milliGRAM(s) Oral (10-04 @ 13:32)  valACYclovir: 500 milliGRAM(s) Oral (10-04 @ 13:31)    Data:    T(C): 36.9  HR: 124  BP: 113/49  RR: 20  SpO2: 98%    -WBC 12.58 / HgB 9.3 / Hct 29.3 / Plt 399  -Na 138 / Cl 98 / BUN 37 / Glucose 148  -K 3.1 / CO2 28 / Cr 1.33  -ALT -- / Alk Phos -- / T.Bili --  -INR -- / PTT 28.9      Radiology:     Assessment/Plan: 73y Female with DM on insulin, HTN, CKD, breast CA, respiratory arrest and cardiac arrest (2018), CVA with residual weakness, aspiration PNA h/o trach, PEG, with respiratory distress requiring intubation. Course c/b RUSS. Nontunneled HD catheter placed on the floor 9/27 with initiation of HD. IR consulted for tunneled HD catheter.    -- IR will plan to perform tunneled HD catheter on Tuesday 10/10, after patient receives dialysis on Mon (M/W/F schedule)  -- Please hold Eliquis after Saturday night's dose (48 hours prior to procedure)  -- Hold AM dose of any additional anticoagulation  -- NPO at midnight on Monday 10/9  -- Routine AM labs plus coags (PT/PTT/INR) on 10/10  -- please place IR procedure request order under Dr. Love  -- Please write IR pre procedure note      - Non-emergent consults: Place IR consult order in [place]  - Emergent issues (pager): [facility] [phone]; [facility] [phone]; [phone]  - Scheduling questions: [facility] [phone]; [facility] [phone]  - Clinic/outpatient booking: [facility] [phone]; [facility] [phone]

## 2023-10-05 NOTE — PROGRESS NOTE ADULT - SUBJECTIVE AND OBJECTIVE BOX
Tolerated 2L UF with HD yesterday  Remains on vent    VITAL:  T(C): , Max: 37.8 (10-04-23 @ 21:57)  T(F): , Max: 100 (10-04-23 @ 21:57)  HR: 124 (10-05-23 @ 12:00)  BP: 113/49 (10-05-23 @ 12:00)  BP(mean): --  RR: 20 (10-05-23 @ 12:00)  SpO2: 98% (10-05-23 @ 12:00)    PHYSICAL EXAM:  Constitutional: minimally communicative at present; on vent  HEENT: trach-vent, (+)NGT  Respiratory: coarse BS b/l  Cardiovascular: tachy reg s1s2  Gastrointestinal: BS+, soft, NT/ND  Extremities: + peripheral edema  Neurological: tone WNL  Skin: No rashes  Access: (+)[facility]    LABS:                        9.3    12.58 )-----------( 399      ( 05 Oct 2023 05:43 )             29.3     Na(138)/K(3.1)/Cl(98)/HCO3(28)/BUN(37)/Cr(1.33)Glu(148)/Ca(8.5)/Mg(1.90)/PO4(2.2)    10-05 @ 05:43  Na(137)/K(3.7)/Cl(98)/HCO3(29)/BUN(51)/Cr(1.77)Glu(198)/Ca(8.8)/Mg(2.10)/PO4(2.6)    10-04 @ 05:50  Na(138)/K(3.6)/Cl(99)/HCO3(27)/BUN(40)/Cr(1.41)Glu(141)/Ca(8.4)/Mg(1.90)/PO4(1.6)    10-03 @ 02:49      IMPRESSION: 73F w/ HTN, DM2, CVA, breast CA-bilateral mastectomy, recurrent aspiration pneumonia/respiratory failure, and CKD, 8/11/23 p/w acute hypercapnic respiratory failure; c/b RUSS    (1)Renal - RUSS on CKD4 ==>now newly ESRD. Warranting conversion of shiley to tunneled cath at this point    (2)Lytes - hypokalemia/hypophosphatemia; best that we change tube feeds from Nepro to Glucerna    (3)Pulm- vent-dependent        RECOMMEND:  (1)Change tube feeds from Nepro to Glucerna  (2)Tunneled cath placement 10/10/23 per IR  (3)Next HD tomorrow - 3h, 2kg UF as able, pressors as needed to keep SBP>90; Epogen with HD  (4)Dose new meds for GFR <10/HD            Jimmy Colon MD  [facility]  Office/on call physician: [phone]  Cell (7a-7p): [phone]

## 2023-10-05 NOTE — PROGRESS NOTE ADULT - SUBJECTIVE AND OBJECTIVE BOX
Patient is a 73y old  Female who presents with a chief complaint of Respiratory distress (05 Oct 2023 17:18)      SUBJECTIVE / OVERNIGHT EVENTS: mild facial erythema remaining, but afebrile and not worse, tolerating AZTREONAM    MEDICATIONS  (STANDING):  albuterol/ipratropium for Nebulization 3 milliLiter(s) Nebulizer every 6 hours  apixaban 5 milliGRAM(s) Oral two times a day  artificial tears (preservative free) Ophthalmic Solution 1 Drop(s) Both EYES every 4 hours  aspirin  chewable 81 milliGRAM(s) Enteral Tube daily  atorvastatin 10 milliGRAM(s) Oral at bedtime  aztreonam  IVPB 2000 milliGRAM(s) IV Intermittent <User Schedule>  chlorhexidine 0.12% Liquid 15 milliLiter(s) Oral Mucosa every 12 hours  chlorhexidine 2% Cloths 1 Application(s) Topical daily  dextrose 5% + lactated ringers. 1000 milliLiter(s) (75 mL/Hr) IV Continuous <Continuous>  dextrose 5%. 1000 milliLiter(s) (100 mL/Hr) IV Continuous <Continuous>  dextrose 5%. 1000 milliLiter(s) (50 mL/Hr) IV Continuous <Continuous>  dextrose 50% Injectable 12.5 Gram(s) IV Push once  dextrose 50% Injectable 25 Gram(s) IV Push once  dextrose 50% Injectable 25 Gram(s) IV Push once  dextrose 50% Injectable 25 Gram(s) IV Push once  epoetin odalis (EPOGEN) Injectable 93598 Unit(s) IV Push <User Schedule>  ferrous    sulfate Liquid 300 milliGRAM(s) Oral daily  glucagon  Injectable 1 milliGRAM(s) IntraMuscular once  insulin lispro (ADMELOG) corrective regimen sliding scale   SubCutaneous every 6 hours  insulin NPH human recombinant 5 Unit(s) SubCutaneous every 6 hours  levETIRAcetam  Solution 1000 milliGRAM(s) Oral daily  levETIRAcetam  Solution 250 milliGRAM(s) Oral <User Schedule>  melatonin 3 milliGRAM(s) Oral at bedtime  midodrine. 30 milliGRAM(s) Oral every 8 hours  pantoprazole  Injectable 40 milliGRAM(s) IV Push two times a day  petrolatum Ophthalmic Ointment 1 Application(s) Both EYES two times a day  sodium chloride 2 Gram(s) Oral two times a day  sodium chloride 3%  Inhalation 4 milliLiter(s) Inhalation every 6 hours  valACYclovir 500 milliGRAM(s) Oral every 24 hours    MEDICATIONS  (PRN):  acetaminophen   Oral Liquid .. 650 milliGRAM(s) Oral every 6 hours PRN Temp greater or equal to 38C (100.4F), Mild Pain (1 - 3)  calamine/zinc oxide Lotion 1 Application(s) Topical two times a day PRN Rash and/or Itching  dextrose Oral Gel 15 Gram(s) Oral once PRN Blood Glucose LESS THAN 70 milliGRAM(s)/deciliter  sodium chloride 0.9% lock flush 10 milliLiter(s) IV Push every 1 hour PRN Pre/post blood products, medications, blood draw, and to maintain line patency      Vital Signs Last 24 Hrs  T(F): 98.4 (10-05-23 @ 12:00), Max: 100 (10-04-23 @ 21:57)  HR: 117 (10-05-23 @ 16:55) (98 - 125)  BP: 110/48 (10-05-23 @ 16:55) (88/41 - 123/53)  RR: 20 (10-05-23 @ 16:55) (20 - 22)  SpO2: 99% (10-05-23 @ 16:55) (97% - 100%)  Telemetry:   CAPILLARY BLOOD GLUCOSE      POCT Blood Glucose.: 116 mg/dL (05 Oct 2023 17:52)  POCT Blood Glucose.: 59 mg/dL (05 Oct 2023 17:12)  POCT Blood Glucose.: 58 mg/dL (05 Oct 2023 16:58)  POCT Blood Glucose.: 110 mg/dL (05 Oct 2023 11:31)  POCT Blood Glucose.: 145 mg/dL (05 Oct 2023 05:37)  POCT Blood Glucose.: 136 mg/dL (04 Oct 2023 23:38)    I&O's Summary    04 Oct 2023 07:01  -  05 Oct 2023 07:00  --------------------------------------------------------  IN: 1240 mL / OUT: 2400 mL / NET: -1160 mL        PHYSICAL EXAM:  GENERAL: NAD, well-developed  HEAD:  Atraumatic, Normocephalic  EYES: EOMI, PERRLA, conjunctiva and sclera clear  NECK: Supple, No JVD  CHEST/LUNG: Clear to auscultation bilaterally; No wheeze  HEART: Regular rate and rhythm; No murmurs, rubs, or gallops  ABDOMEN: Soft, Nontender, Nondistended; Bowel sounds present  EXTREMITIES:  2+ Peripheral Pulses, No clubbing, cyanosis, or edema  PSYCH: AAOx3  NEUROLOGY: non-focal  SKIN: No rashes or lesions    LABS:                        9.3    12.58 )-----------( 399      ( 05 Oct 2023 05:43 )             29.3     10-05    138  |  98  |  37<H>  ----------------------------<  148<H>  3.1<L>   |  28  |  1.33<H>    Ca    8.5      05 Oct 2023 05:43  Phos  2.2     10-05  Mg     1.90     10-05    TPro  6.0  /  Alb  2.4<L>  /  TBili  0.3  /  DBili  x   /  AST  46<H>  /  ALT  28  /  AlkPhos  345<H>  10-04          Urinalysis Basic - ( 05 Oct 2023 05:43 )    Color: x / Appearance: x / SG: x / pH: x  Gluc: 148 mg/dL / Ketone: x  / Bili: x / Urobili: x   Blood: x / Protein: x / Nitrite: x   Leuk Esterase: x / RBC: x / WBC x   Sq Epi: x / Non Sq Epi: x / Bacteria: x        RADIOLOGY & ADDITIONAL TESTS:    Imaging Personally Reviewed:    Consultant(s) Notes Reviewed:      Care Discussed with Consultants/Other Providers:

## 2023-10-05 NOTE — PROGRESS NOTE ADULT - ASSESSMENT
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 While in MICU patient noted with worsening renal failure requiring HD initiation, CGE/ Melena s/p EGD 8/31 found with esophagitis and erosive gastropathy, new right cerebellar infarct and fevers second to ESBL COLI and MSSA VAP, MSSA bacteremia, left ear otitis externa and drug rxn to cephalosporins Course further complicated by prolonged vent time s/p tracheostomy 9/1 and transferred to RCU 9/3 completed by recurrent fevers with high PIP, respiratory acidosis and concern for volume overloaded.   CTH repeated 9/26 for concern for encephalopathy - has known now - chronic bilateral cerebellar infarcts, L PCA infarct. L parietal hypodensity seen on prior CT likely artifactual  Repeat CTH 10/3 - unchanged  EEG 10/5 with L posterocentral/temporal spikes/sharp waves with possible concern for focal seizure   Opening eyes more on exam today    Impression:   ? Focal seizure - has hx of bilateral R > L tremors - only one episode of possible EEG correlate with RUE shaking   Metabolic encephalopathy related to shock, infection, doubt new stroke  L PCA stroke, ESUS s/p ILR, also with bilateral centrum semiovale watershed infarcts   Multiple embolic strokes on MRI 8/17 - R cerebellum, midbrain, multiple other tiny embolic infarcts.   Dementia   MSSA bacteremia, r/o endocarditis s/p Daptomycin  Acute popliteal DVT on Eliquis   Anemia     Recommendations   [] Keppra 500mg BID with extra 250mg after HD on HD days.  [] continue EEG to see if improvement with Keppra   [] On Aztreonam  [] New CTH from 9/26 and 10/3 without any evidence of acute infarct -> encephalopathy likely related to underlying metabolic derangements.  [] GI following for coffee ground emesis - esophagitis seen on colonoscopy, on ELiquis  [] family declined kidney biopsy for AIN -> s/p steroid tx   [] C/w home ASA 81 mg if no contraindications   [] hep gtt transitioned to ELiquis -> ?consider holding and IVC filter given anemia   [] C/w home Atorvastatin 10 mg qhs   [] would interrogate ILR and review tele to see if pAF -. no AF seen  [] DVT/GI ppx - hep gtt   [] Neurochecks q4hr   [] BP goals: Normotension - on pressors  [] PT/OT when able   [] HgA1C goals < 7.0   [] continue to address GOC with family as pts  and daughter continue to remain hopeful    d/w  and PA    Katty Charles, DO  Vascular Neurology  Office [phone]

## 2023-10-05 NOTE — PROGRESS NOTE ADULT - SUBJECTIVE AND OBJECTIVE BOX
Subjective: Patient seen and examined. No new events except as noted.     REVIEW OF SYSTEMS:    Unable to obtain    MEDICATIONS:  MEDICATIONS  (STANDING):  albuterol/ipratropium for Nebulization 3 milliLiter(s) Nebulizer every 6 hours  apixaban 5 milliGRAM(s) Oral two times a day  artificial tears (preservative free) Ophthalmic Solution 1 Drop(s) Both EYES every 4 hours  aspirin  chewable 81 milliGRAM(s) Enteral Tube daily  atorvastatin 10 milliGRAM(s) Oral at bedtime  aztreonam  IVPB 2000 milliGRAM(s) IV Intermittent <User Schedule>  chlorhexidine 0.12% Liquid 15 milliLiter(s) Oral Mucosa every 12 hours  chlorhexidine 2% Cloths 1 Application(s) Topical daily  dextrose 5%. 1000 milliLiter(s) (50 mL/Hr) IV Continuous <Continuous>  dextrose 5%. 1000 milliLiter(s) (100 mL/Hr) IV Continuous <Continuous>  dextrose 50% Injectable 12.5 Gram(s) IV Push once  dextrose 50% Injectable 25 Gram(s) IV Push once  dextrose 50% Injectable 25 Gram(s) IV Push once  epoetin odalis (EPOGEN) Injectable 13579 Unit(s) IV Push <User Schedule>  ferrous    sulfate Liquid 300 milliGRAM(s) Oral daily  glucagon  Injectable 1 milliGRAM(s) IntraMuscular once  insulin lispro (ADMELOG) corrective regimen sliding scale   SubCutaneous every 6 hours  insulin NPH human recombinant 11 Unit(s) SubCutaneous every 6 hours  midodrine. 30 milliGRAM(s) Oral every 8 hours  pantoprazole  Injectable 40 milliGRAM(s) IV Push two times a day  petrolatum Ophthalmic Ointment 1 Application(s) Both EYES two times a day  sodium chloride 2 Gram(s) Oral two times a day  sodium chloride 3%  Inhalation 4 milliLiter(s) Inhalation every 6 hours  valACYclovir 500 milliGRAM(s) Oral every 24 hours      PHYSICAL EXAM:  T(C): 37 (10-05-23 @ 08:00), Max: 37.8 (10-04-23 @ 21:57)  HR: 118 (10-05-23 @ 08:00) (98 - 118)  BP: 123/53 (10-05-23 @ 08:00) (88/41 - 123/53)  RR: 22 (10-05-23 @ 08:00) (20 - 22)  SpO2: 100% (10-05-23 @ 08:00) (97% - 100%)  Wt(kg): --  I&O's Summary    04 Oct 2023 07:01  -  05 Oct 2023 07:00  --------------------------------------------------------  IN: 1240 mL / OUT: 2400 mL / NET: -1160 mL            Appearance: NAD, +trach  HEENT: dry oral mucosa  Lymphatic: No lymphadenopathy  Cardiovascular: Normal S1 S2, No JVD, No murmurs, No edema  Respiratory: Decreased BS, + trach to vent	  Neuro: opens eyes  Gastrointestinal: Soft, Non-tender, + BS, + NGT  Skin: No rashes, No ecchymoses, No cyanosis	  Extremities: No strength/ROM 2/2 sedation, + BL LE edema  Vascular: Peripheral pulses palpable 2+ bilaterally  Mode: AC/ CMV (Assist Control/ Continuous Mandatory Ventilation), RR (machine): 22, TV (machine): 340, FiO2: 35, PEEP: 8, ITime: 0.81, MAP: 20, PIP: 55        LABS:    CARDIAC MARKERS:                                9.3    12.58 )-----------( 399      ( 05 Oct 2023 05:43 )             29.3     10-05    138  |  98  |  37<H>  ----------------------------<  148<H>  3.1<L>   |  28  |  1.33<H>    Ca    8.5      05 Oct 2023 05:43  Phos  2.2     10-05  Mg     1.90     10-05    TPro  6.0  /  Alb  2.4<L>  /  TBili  0.3  /  DBili  x   /  AST  46<H>  /  ALT  28  /  AlkPhos  345<H>  10-04    proBNP:   Lipid Profile:   HgA1c:   TSH:             TELEMETRY: 	    ECG:  	  RADIOLOGY:   DIAGNOSTIC TESTING:  [ ] Echocardiogram:  [ ]  Catheterization:  [ ] Stress Test:    OTHER:

## 2023-10-05 NOTE — PROGRESS NOTE ADULT - NS ATTEND AMEND GEN_ALL_CORE FT
Patient remains minimally responsive. Has RUE focal twitching. EEG with possible focal seizure with RUE twitching and at risk of focal onset seizures from multiple locations, especially left posterior temporal/posterocentral region, likely due to encephalomalacia/gliosis from prior left parietal CVA. Neurology evaluation to start antiepileptic therapy. Also with underlying encephalopathy due to acute illnes + RUSS + sepsis + history of CVA's (MICU course complicated by new R cerebellar, midbrain, and multiple tiny embolic infarcts as well as known left PCA CVA). Repeat CT head on 10/3 with no acute changes. Ammonia normal. BUN improved. Continue vEEG monitoring.    Continue dialysis with fluid removal per renal, s/p 2 liters fluid removal yesterday. Stable H/H. Anemia of CKD, continue Epoetin per nephrology.     No further fevers. Continue Aztreonam per ID for coverage of  Pseudomonas and Proteus. Repeat sputum culture with numerous Pseudomonas aeruginosa, f/up sensitivities. Appreciate ENT follow-up for left ear otitis externa, which has now resolved. Follow-up ophthalmology regarding keratitis + chemosis, on Lacrilube and artificial tears.     She had episode of hypotension yesterday after dialysis, but is now HD stable. Continue midodrine 30 mg q8h.     Continue lung protective ventilation. Airway clearance therapy with Duonebs and hypertonic saline.    Continue tube feeds as tolerates. PPI for GI ppx.    On apixaban for DVT.     Overall prognosis guarded, remains full code. Palliative care follow-up. I am worried that patient is approaching end of life. I discussed this with her , who remains optimistic for her recovery, but understands that her condition is not improving.

## 2023-10-05 NOTE — PROGRESS NOTE ADULT - SUBJECTIVE AND OBJECTIVE BOX
S: No overnight events. Pt seen. EEG with epileptogenic potential      Medications: MEDICATIONS  (STANDING):  albuterol/ipratropium for Nebulization 3 milliLiter(s) Nebulizer every 6 hours  apixaban 5 milliGRAM(s) Oral two times a day  artificial tears (preservative free) Ophthalmic Solution 1 Drop(s) Both EYES every 4 hours  aspirin  chewable 81 milliGRAM(s) Enteral Tube daily  atorvastatin 10 milliGRAM(s) Oral at bedtime  aztreonam  IVPB 2000 milliGRAM(s) IV Intermittent <User Schedule>  chlorhexidine 0.12% Liquid 15 milliLiter(s) Oral Mucosa every 12 hours  chlorhexidine 2% Cloths 1 Application(s) Topical daily  dextrose 5% + lactated ringers. 1000 milliLiter(s) (75 mL/Hr) IV Continuous <Continuous>  dextrose 5%. 1000 milliLiter(s) (50 mL/Hr) IV Continuous <Continuous>  dextrose 5%. 1000 milliLiter(s) (100 mL/Hr) IV Continuous <Continuous>  dextrose 50% Injectable 12.5 Gram(s) IV Push once  dextrose 50% Injectable 25 Gram(s) IV Push once  dextrose 50% Injectable 25 Gram(s) IV Push once  dextrose 50% Injectable 25 Gram(s) IV Push once  epoetin odalis (EPOGEN) Injectable 78299 Unit(s) IV Push <User Schedule>  ferrous    sulfate Liquid 300 milliGRAM(s) Oral daily  glucagon  Injectable 1 milliGRAM(s) IntraMuscular once  insulin lispro (ADMELOG) corrective regimen sliding scale   SubCutaneous every 6 hours  insulin NPH human recombinant 5 Unit(s) SubCutaneous every 6 hours  levETIRAcetam  Solution 1000 milliGRAM(s) Oral daily  levETIRAcetam  Solution 250 milliGRAM(s) Oral <User Schedule>  melatonin 3 milliGRAM(s) Oral at bedtime  midodrine. 30 milliGRAM(s) Oral every 8 hours  pantoprazole  Injectable 40 milliGRAM(s) IV Push two times a day  petrolatum Ophthalmic Ointment 1 Application(s) Both EYES two times a day  sodium chloride 2 Gram(s) Oral two times a day  sodium chloride 3%  Inhalation 4 milliLiter(s) Inhalation every 6 hours  valACYclovir 500 milliGRAM(s) Oral every 24 hours    MEDICATIONS  (PRN):  acetaminophen   Oral Liquid .. 650 milliGRAM(s) Oral every 6 hours PRN Temp greater or equal to 38C (100.4F), Mild Pain (1 - 3)  calamine/zinc oxide Lotion 1 Application(s) Topical two times a day PRN Rash and/or Itching  dextrose Oral Gel 15 Gram(s) Oral once PRN Blood Glucose LESS THAN 70 milliGRAM(s)/deciliter  sodium chloride 0.9% lock flush 10 milliLiter(s) IV Push every 1 hour PRN Pre/post blood products, medications, blood draw, and to maintain line patency       Vitals:  Vital Signs Last 24 Hrs  T(C): 36.9 (05 Oct 2023 12:00), Max: 37.2 (04 Oct 2023 23:40)  T(F): 98.4 (05 Oct 2023 12:00), Max: 98.9 (04 Oct 2023 23:40)  HR: 117 (05 Oct 2023 16:55) (98 - 125)  BP: 110/48 (05 Oct 2023 16:55) (88/41 - 123/53)  BP(mean): --  RR: 21 (05 Oct 2023 20:00) (20 - 22)  SpO2: 99% (05 Oct 2023 20:00) (97% - 100%)    Parameters below as of 05 Oct 2023 20:00  Patient On (Oxygen Delivery Method): ventilator    O2 Concentration (%): 40     (%): 40      Neurological Exam:  Mental Status: Eyes closed, off sedation. Does not follow commands,  + trach to vent and NGT   Cranial Nerves: PERRL slightly sluggish, L subconjunctival hemorrhage  Motor: Moves upper extremities spontaneously, tremor R > L  no movement noted in lowers  Sensation: WD to noxious x4    I personally reviewed the below data/images/labs:           LABS:                          9.3    12.58 )-----------( 399      ( 05 Oct 2023 05:43 )             29.3     10-05    138  |  98  |  37<H>  ----------------------------<  148<H>  3.1<L>   |  28  |  1.33<H>    Ca    8.5      05 Oct 2023 05:43  Phos  2.2     10-05  Mg     1.90     10-05    TPro  6.0  /  Alb  2.4<L>  /  TBili  0.3  /  DBili  x   /  AST  46<H>  /  ALT  28  /  AlkPhos  345<H>  10-04    LIVER FUNCTIONS - ( 04 Oct 2023 05:50 )  Alb: 2.4 g/dL / Pro: 6.0 g/dL / ALK PHOS: 345 U/L / ALT: 28 U/L / AST: 46 U/L / GGT: x             Urinalysis Basic - ( 05 Oct 2023 05:43 )    Color: x / Appearance: x / SG: x / pH: x  Gluc: 148 mg/dL / Ketone: x  / Bili: x / Urobili: x   Blood: x / Protein: x / Nitrite: x   Leuk Esterase: x / RBC: x / WBC x   Sq Epi: x / Non Sq Epi: x / Bacteria: x                < from: CT Head No Cont (08.15.23 @ 17:20) >    ACC: [id number] EXAM:  CT BRAIN   ORDERED BY: MINAL SAM     PROCEDURE DATE:  08/15/2023          INTERPRETATION:  Clinical indication: Change in neuro exam.    Multiple axial sections were performed from base to vertex without   contrast enhancement. Coronal and sagittal reconstructions were   reformatted well.    This exam is compared prior head CT performed on August 11, 2023    Parenchymal volume loss and chronic microvessel ischemic changes are   again seen.    Abnormal low-attenuation involving the left occipital cortical   subcortical region is again seen. 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    No evidence of acute hemorrhage mass or mass effect is seen.    Evaluation of the osseous structures with appropriate window demonstrates   sclerotic changes about the left mastoid region which appears stable.   Opacification left middle ear region is again seen.    Patient is status post bilateral cataract surgery.    Impression: Stable exam.    < end of copied text >    vEEG:    Clinical Impression:  - Severe diffuse non-specific cerebral dysfunction  - There were no epileptiform abnormalities or seizures recorded.        CTH 8/11:    VENTRICLES AND SULCI: Age-appropriate involutional change  INTRA-AXIAL:  Old left PCA infarct as seen on the prior unchanged.   Microvascular ischemic changes involving the periventricular and   subcortical white matter as seen previously  EXTRA-AXIAL:  No mass or collection is seen.  VISUALIZED SINUSES:  Clear.  VISUALIZED MASTOIDS: Left mastoid sclerosis  CALVARIUM: Infiltrative appearance tothe calvarium may be indicative of   marrow infiltration on the basis of patient's known diagnosis of breast   cancer. MISCELLANEOUS:  None.    IMPRESSION:  No significant interval change compared with 7/17/2023 in   left PCA infarct which is old. Microvascular ischemic changes involving   the periventricular and subcortical white matter as seen   previously.Questionable lesions at the level of the calvarium related to   possible breast CA. Clinical correlation recommended.    --- End of Report ---      ct< from: CT Head No Cont (09.26.23 @ 21:02) >  IMPRESSION: Vague questionable areas of hypoattenuation within the   bilateral cerebellar hemispheres which may be compatible with   acute/subacute ischemia. Recommend further evaluation with a brain MRI   study, provided there are no MRI contraindications.    No acute intracranial hemorrhage.    Previously seen questionable vague wedge-shaped area of hypoattenuation   in the left parietal lobe with artifactual secondary to motion and volume   averaging with a prominent sulcus.    Chronic left occipital lobe infarct.    < end of copied text >    < from: CT Head No Cont (10.03.23 @ 20:46) >    IMPRESSION:    No acute intracranial hemorrhage or mass effect. Evaluation of the   posterior fossa degraded by streak artifact from dental amalgam.   Lucencies in the bilateral cerebellum may be artifactual.    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    Bilateral middle ear and mastoid effusions which are also seen on prior   exam.    < end of copied text >    EEG Classification / Summary:  Abnormal EEG in the awake, drowsy, and asleep states.   -Event of right upper extremity twitching, which may be time-locked to left posterocentral/temporal spikes/sharp waves.  -Frequent left posterior temporal/posterocentral spike/sharp waves, occasionally periodic at 0.5-1 Hz  -Occasional right central, right parietal, and independent left and right frontal spike/sharp waves  -Occasional bilateral posterior quadrant and bilateral frontal sharp waves  -Moderate diffuse slowing    Clinical Impression:  -Possible focal seizure with right upper extremity twitching 10/4/23 16:07  -Risk of focal-onset seizures from multiple locations, most prominent in the left posterior temporal/posterocentral region  -Possible additional risk of generalized seizures  -Moderate diffuse cerebral dysfunction is nonspecific in etiology.

## 2023-10-05 NOTE — PROGRESS NOTE ADULT - SUBJECTIVE AND OBJECTIVE BOX
Follow Up:      [place] History/ROS:Patient is a 73y old  Female who presents with a chief complaint of Respiratory distress (05 Oct 2023 16:17)    Minimal facial erythema  No fever    Allergies    isoniazid (Rash)  nafcillin (Unknown)  hydrALAZINE (Rash)  vitamin E (Short breath; Urticaria; Hives)  doxycycline (Rash)  cefepime (Rash)  NIFEdipine (Urticaria; Hives)    Intolerances        ANTIMICROBIALS:  aztreonam  IVPB 2000 <User Schedule>  valACYclovir 500 every 24 hours      OTHER MEDS:  acetaminophen   Oral Liquid .. 650 milliGRAM(s) Oral every 6 hours PRN  albuterol/ipratropium for Nebulization 3 milliLiter(s) Nebulizer every 6 hours  apixaban 5 milliGRAM(s) Oral two times a day  artificial tears (preservative free) Ophthalmic Solution 1 Drop(s) Both EYES every 4 hours  aspirin  chewable 81 milliGRAM(s) Enteral Tube daily  atorvastatin 10 milliGRAM(s) Oral at bedtime  calamine/zinc oxide Lotion 1 Application(s) Topical two times a day PRN  chlorhexidine 0.12% Liquid 15 milliLiter(s) Oral Mucosa every 12 hours  chlorhexidine 2% Cloths 1 Application(s) Topical daily  dextrose 5%. 1000 milliLiter(s) IV Continuous <Continuous>  dextrose 5%. 1000 milliLiter(s) IV Continuous <Continuous>  dextrose 50% Injectable 12.5 Gram(s) IV Push once  dextrose 50% Injectable 25 Gram(s) IV Push once  dextrose 50% Injectable 25 Gram(s) IV Push once  dextrose Oral Gel 15 Gram(s) Oral once PRN  epoetin odalis (EPOGEN) Injectable 09295 Unit(s) IV Push <User Schedule>  ferrous    sulfate Liquid 300 milliGRAM(s) Oral daily  glucagon  Injectable 1 milliGRAM(s) IntraMuscular once  insulin lispro (ADMELOG) corrective regimen sliding scale   SubCutaneous every 6 hours  insulin NPH human recombinant 11 Unit(s) SubCutaneous every 6 hours  melatonin 3 milliGRAM(s) Oral at bedtime  midodrine. 30 milliGRAM(s) Oral every 8 hours  pantoprazole  Injectable 40 milliGRAM(s) IV Push two times a day  petrolatum Ophthalmic Ointment 1 Application(s) Both EYES two times a day  sodium chloride 2 Gram(s) Oral two times a day  sodium chloride 0.9% lock flush 10 milliLiter(s) IV Push every 1 hour PRN  sodium chloride 3%  Inhalation 4 milliLiter(s) Inhalation every 6 hours      Vital Signs Last 24 Hrs  T(C): 36.9 (05 Oct 2023 12:00), Max: 37.8 (04 Oct 2023 21:57)  T(F): 98.4 (05 Oct 2023 12:00), Max: 100 (04 Oct 2023 21:57)  HR: 117 (05 Oct 2023 16:55) (98 - 125)  BP: 113/49 (05 Oct 2023 12:00) (88/41 - 123/53)  BP(mean): --  RR: 20 (05 Oct 2023 12:00) (20 - 22)  SpO2: 99% (05 Oct 2023 16:55) (97% - 100%)    Parameters below as of 05 Oct 2023 08:00  Patient On (Oxygen Delivery Method): ventilator  O2 Flow (L/min): 40      PHYSICAL EXAM:  General: [ x] on vent  HEAD/EYES: [ ] PERRL [x ] white sclera [ ] icterus  ENT:  [ ] normal [ ] supple [ ] thrush [ x] trach  Cardiovascular:   [ ] murmur [ ] normal [ ] PPM/AICD  Respiratory:  [x ] clear to ausculation bilaterally  GI:  [x ] soft, non-tender, normal bowel sounds  :  [ ] willard [ ] no CVA tenderness   Musculoskeletal:  [ ] no synovitis  Neurologic:  [ ] non-focal exam   Skin:  [x ] no rash  Lymph: [ x] no lymphadenopathy  Psychiatric:  [ ] appropriate affect [ ] alert & oriented  Lines:  [x ] no phlebitis [ ] central line                                9.3    12.58 )-----------( 399      ( 05 Oct 2023 05:43 )             29.3       10-05    138  |  98  |  37<H>  ----------------------------<  148<H>  3.1<L>   |  28  |  1.33<H>    Ca    8.5      05 Oct 2023 05:43  Phos  2.2     10-05  Mg     1.90     10-05    TPro  6.0  /  Alb  2.4<L>  /  TBili  0.3  /  DBili  x   /  AST  46<H>  /  ALT  28  /  AlkPhos  345<H>  10-04      Urinalysis Basic - ( 05 Oct 2023 05:43 )    Color: x / Appearance: x / SG: x / pH: x  Gluc: 148 mg/dL / Ketone: x  / Bili: x / Urobili: x   Blood: x / Protein: x / Nitrite: x   Leuk Esterase: x / RBC: x / WBC x   Sq Epi: x / Non Sq Epi: x / Bacteria: x        MICROBIOLOGY:Culture Results:   Numerous Pseudomonas aeruginosa  Normal Respiratory Cate absent (10-03-23 @ 20:25)  Culture Results:   No growth at 5 days (09-29-23 @ 15:00)  Culture Results:   No growth at 5 days (09-29-23 @ 13:15)  Culture Results:   Rare Yeast (09-29-23 @ 09:15)  Culture Results:   Numerous Proteus mirabilis  Moderate Pseudomonas aeruginosa (Carbapenem Resistant)  Normal Respiratory Cate absent (09-29-23 @ 04:43)      RADIOLOGY:

## 2023-10-05 NOTE — PROGRESS NOTE ADULT - ASSESSMENT
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 While in MICU patient noted with worsening renal failure requiring HD initiation, CGE/ Melena s/p EGD 8/31 found with esophagitis and erosive gastropathy, new right cerebellar infarct and fevers second to ESBL COLI and MSSA VAP, MSSA bacteremia, left ear otitis externa and drug rxn to cephalosporins. Course further complicated by prolonged vent time s/p tracheostomy 9/1 and transferred to RCU 9/3 completed by recurrent fevers, high PIPs with hypervolemia, mucous plug and tracheomalacia with dynamic collapse, progressive left ear OM, and left eye conjunctivitis? Case discussed at length renal and attempted renal recovery however failed and R IJ SHILEY failed. Attempted volume removal with Bumex GTT however course complicated by hypotension requiring transfer back to MICU 9/26. Diuresis dc'ed, pressors weaned off and stress steroids started. Called Optho and left eye with possible uveitits and Ofloxacin drops, Valcyte PO, Erythromycin drops, and artificial tears/ lacrilube continued. L IJ SHILEY placed (9/30) and HD reinstated with good volume removal. Course further complicated by AOCD and  PSA and Proteus VAP and Bradford and DAPTO continued. Patient transferred back to RCU 10/1.    NEUROLOGY  # NEW cerebella infarct   - Baseline MS AOX3 with short term memory changes per family however noted with concern for poor mentation in ICU  - MRI (8/17) with NEW right cerebellar infarct and old left PCA/occipital infarcts  - STEPHANIE (8/17) with AV showing two linear mobile echogenic elements attached to valve leaflets consistent with Lambel's excrescence, but no TRINITY thrombus, and lambel's excrescence with uncertain clinical implication.   - EEG (8/11-8/15) with no seizures   - ILR interrogation (9/7) with SR and PACs falsely recorded as AF.   - Attempted to resume ASA for medical management but held given GIB   - Continue on lipitor for medical management   - Course complicated by questionable head and body shaking 9/8 however prolactin elevated. Discussed for spot EEG however held given low likelihood of seizures noted and EEG held.     # Metabolic vs Uremic Encephalopathy   - Lethargy noted with progression to stupor thought to be second to uremia.   - RPT CTH (9/26) with vague areas of hypoattenuation within the bilateral cerebellar hemispheres which may be compatible with acute/subacute ischemia.   - Discussed CTH with neurology and no signs of new infarct noted.   - HD reinstated in ICU with improvement in uremia however mentation remained   - Poor mentation likely in setting of infections.   - Monitor mentation closely     CARDIOVASCULAR  # HTN Urgency   # Septic vs vasoplegic shock   - Labile BPs ranging in between SBP 80s-200s and attempted labetalol, however noted with hypotension and bradycardia likely from BB toxicity vs shock state?    - Holding Labetalol, Catapres and Catapres.    - Attempted volume removal with bumex diuresis however noted with return of shock state requiring pressor support and transfer back to ICU.   - Pressors weaned off to stress dose and Midodrine   - Wean off Solucortef 50 TID (9/27-9/29) then Solucortef 25 TID (9/29-10/2) and now on Solucortef 25 QD (10/2 - 10/4) then DONE  - Continue on Midodrine 30 TID (9/29 - ) and wean as tolerated     # Hx of HFpEF with likely ADHF with volume overload.   - ECHO (7/2023) with EF 59 with severe LVH and diastolic dysfunction   - STEPHANIE (8/18) with normal LVRVSF however noted with increased LA pressures and persistent LA septal bowing into RA.   - ECHO (8/11) with EF 60 with mild LVH, normal LVRVSF, and mild ddfxn.  - Remove volume with HD sessions     HEENT   # Left ear OE   - ENT evaluation (9/7) with no mastoid tenderness, however found with positive swelling and excoriation to left auricle and tenderness to manipulation of auricle. Debrided crusting of auricle and EAC evaluated with edema and unable to visualize TM. EAC debrided and found with watery exudate.   - CT IAC with acute on chronic left-sided otitis externa and acute on chronic left-sided otomastoiditis. Superimposed left-sided tympanosclerosis. Superimposed cholesteatoma formation within the left tympanomastoid cavity cannot be excluded  - Continued on CiproHC (9/5 - 9/16) and Bradford (9/1-10/1)   - Case discussed with ENT at length and now continued on acetic acid drops BID to left ear and bactroban ointment to left pinna BID.   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   - Patient with tongue pain and seen with anterior ulcerations consolidating and crusting and posterior ulceration.  - Case discussed with pathology resident and culture/ cytology sent.   - HSV negative and Path (9/6) with normal appearing epithelial cells singly and in clusters devoid of viral cytopathic effect.   - Continue on magic mouth wash for pain    RESPIRATORY  # AHHRF second to volume overload   - Hx of CKD and HFpEF presented with SOB and hypercarbia second to volume overload requiring intubation and HD initiation   - Vent weaning attempted however limited requiring tracheostomy (9/1) with IP   - Course complicated by PIPs elevated and found with tracheomalacia.   - CT CHEST (9/8) with tracheomalacia, BL pleural effusions and continued consolidations   - Continue on vent and given TBM increased PEEP (9/9) to 20/300/8/40 with improvement   - Continue on duonebs for airway clearance   - SBT when mentation improves.      GI  # UGIB   - CGE x 1 episode on 8/24 and melena x 2 episodes on 8/31 likely residual blood.   - EGD (8/31) found with esophagitis and erosive gastropathy  - Continue on PPI BID through late October and then PPI QD     # Dysphagia   - NGT-TF continued   - Pending PEG however needs improvement prior to placement.     RENAL  # Progressive CKD   - Presented volume overload and progressive RUSS on CKD   - POCUS with no signs of hydronephrosis in ICU  - Pressor support started with improvement in renal function in ICU  - Attempted steroid course in ICU without improvement in renal function   - Case discussed at length with renal and initiated on HD in ICU   - Remain on HD while in RCU however noted to be volume overloaded. Attempted Bumex pushes and patient noted with good UOP.   - Attempted renal recovery in RCU however noted with decent UOP, but BUN/ CRE continued to rise without improvement on diuresis.   - Ultimately resumed on HD (9/27, 9/28, 9/30 and 10/2)   - Continue on HD with aggressive UF as tolerated per Renal    # Hyponatremia   - Continue on salt tabs 2G    - Monitor sodium     # Hyperphosphatemia to Hypophosphatemia with HD  - PTH normal and elevated phos likely from renal failure  - Phos binder with Renvela started with improvement however then noted with hypophosphatemia and Renvela stopped.     INFECTIOUS DISEASE  # ESBL ECOLI and MSSA VAP c/b MSSA bacteremia   - RVP/ COVID (8/11) negative   - SCx (8/11) with MSSA s/p cefepime (8/12-8/13) and then ancef (8/14) however noted with drug reaction and then changed to vanco and aztreonam (8/12-8/13) in ICU .   - Course complicated by recurrent fevers and return of MSSA with bacteremia.   - SCx (9/1) with MSSA and ESBL ECOLI   - BAL (9/1) with MSSA   - BCx (9/1) with MSSA and cleared on (9/4)   - ECHO (9/6) unable to rule out endocarditis   - Continue on Vanco (9/4-9/22) however noted with rashes of uncertain etiology and replaced with Bradford (9/4 - 10/2) and DAPTO (9/26-10/2) for  completion.     # Proteus and  PSA VAP/ Trachitis   - Continued fevers and SCx (9/29) with  PSA and Proteus   - Continued on Bradford as above however noted to be resistant and now changed to Zosyn (10/2 - ). MONITOR CLOSELY GIVEN DRUG RXN IN PAST!  - Rash developed with Zosyn overnight (10/2) and abx switched to Aztreonam (10/3 - ) based on cultures.   - Will repeat SCX (10/3)    # MRSA PCRs   - MRSA PCR (8/11 and 8/14) negative   - MRSA PCR (9/10) with MSSA and s/p bactroban in ICU   - MRSA PCR (9/26) with MSSA and s/p bactroban in ICU   - Next MRSA PCR (10/22)     HEME  # Anemia second to GIB vs renal disease   - s/p multiple units of PRBCs   - Anemia panel with AOCD but with low %sat and ferrous sulfate added to optimize   - Despite iron intermittent anemia noted without bleeding source and EPO added.   - HH dropped (10/2) and s/p 1U PRBC.   - S/P 1/2u PRBC (10/3) with good response.  - Monitor HH     VASCULAR   # LLE POP DVT   - US BL LE (9/10) with acute left deep vein thrombosis of the left popliteal vein.  - RUE swollen and pending VA DOPPLER.   - Continue on Eliquis     ONC   # Hx of Breast CA   - Patient dx in 2018 and s/p BL mastectomy (radical on right) and chemo and RT.   - Supportive care.     ENDOCRINE  # IDDM2   - Continue on NPH 3U and ISS   - Monitor FS and adjust as needed     LINES/ TUBES  - PACO TAYLOR (9/27 - )     SKIN  # Drug Eruption   - Attempted cefepime (8/12) vs ancef (8/13-8/14) and then noted with drug rxn.   - Hold further cephalosporins at this time   - Continue on steroids with prednisone 40 (8/18 - 9/2) then prednisone 30 (9/3-9/7), then prednisone 20 (9/8 - 9/10), then prednisone 10mg (9/11-9/13) then turn off.   - Monitor for further drug rxns.     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 While in MICU patient noted with worsening renal failure requiring HD initiation, CGE/ Melena s/p EGD 8/31 found with esophagitis and erosive gastropathy, new right cerebellar infarct and fevers second to ESBL COLI and MSSA VAP, MSSA bacteremia, left ear otitis externa and drug rxn to cephalosporins. Course further complicated by prolonged vent time s/p tracheostomy 9/1 and transferred to RCU 9/3 completed by recurrent fevers, high PIPs with hypervolemia, mucous plug and tracheomalacia with dynamic collapse, progressive left ear OM, and left eye conjunctivitis? Case discussed at length renal and attempted renal recovery however failed and R IJ SHILEY failed. Attempted volume removal with Bumex GTT however course complicated by hypotension requiring transfer back to MICU 9/26. Diuresis dc'ed, pressors weaned off and stress steroids started. Called Optho and left eye with possible uveitits and Ofloxacin drops, Valcyte PO, Erythromycin drops, and artificial tears/ lacrilube continued. L IJ SHILEY placed (9/30) and HD reinstated with good volume removal. Course further complicated by AOCD and  PSA and Proteus VAP and Bradford and DAPTO continued. Patient transferred back to RCU 10/1.    NEUROLOGY  # NEW cerebella infarct   - Baseline MS AOX3 with short term memory changes per family however noted with concern for poor mentation in ICU  - MRI (8/17) with NEW right cerebellar infarct and old left PCA/occipital infarcts  - STEPHANIE (8/17) with AV showing two linear mobile echogenic elements attached to valve leaflets consistent with Lambel's excrescence, but no TRINITY thrombus, and lambel's excrescence with uncertain clinical implication.   - EEG (8/11-8/15) with no seizures   - ILR interrogation (9/7) with SR and PACs falsely recorded as AF.   - Attempted to resume ASA for medical management but held given GIB   - Continue on lipitor for medical management   - Course complicated by questionable head and body shaking 9/8 however prolactin elevated. Discussed for spot EEG however held given low likelihood of seizures noted and EEG held.   - Rpt EEG (10/4) with c/f possible seizure activity and Keppra started (10/5)    # Metabolic vs Uremic Encephalopathy   - Lethargy noted with progression to stupor thought to be second to uremia.   - RPT CTH (9/26) with vague areas of hypoattenuation within the bilateral cerebellar hemispheres which may be compatible with acute/subacute ischemia.   - Discussed CTH with neurology and no signs of new infarct noted.   - HD reinstated in ICU with improvement in uremia however mentation remained   - Poor mentation likely in setting of infections.   - Monitor mentation closely     CARDIOVASCULAR  # HTN Urgency   # Septic vs vasoplegic shock   - Labile BPs ranging in between SBP 80s-200s and attempted labetalol, however noted with hypotension and bradycardia likely from BB toxicity vs shock state?    - Holding Labetalol, Catapres and Catapres.    - Attempted volume removal with bumex diuresis however noted with return of shock state requiring pressor support and transfer back to ICU.   - Pressors weaned off to stress dose and Midodrine   - Wean off Solucortef 50 TID (9/27-9/29) then Solucortef 25 TID (9/29-10/2) and now on Solucortef 25 QD (10/2 - 10/4) then DONE  - Continue on Midodrine 30 TID (9/29 - ) and wean as tolerated     # Hx of HFpEF with likely ADHF with volume overload.   - ECHO (7/2023) with EF 59 with severe LVH and diastolic dysfunction   - STEPHANIE (8/18) with normal LVRVSF however noted with increased LA pressures and persistent LA septal bowing into RA.   - ECHO (8/11) with EF 60 with mild LVH, normal LVRVSF, and mild ddfxn.  - Remove volume with HD sessions     HEENT   # Left ear OE   - ENT evaluation (9/7) with no mastoid tenderness, however found with positive swelling and excoriation to left auricle and tenderness to manipulation of auricle. Debrided crusting of auricle and EAC evaluated with edema and unable to visualize TM. EAC debrided and found with watery exudate.   - CT IAC with acute on chronic left-sided otitis externa and acute on chronic left-sided otomastoiditis. Superimposed left-sided tympanosclerosis. Superimposed cholesteatoma formation within the left tympanomastoid cavity cannot be excluded  - Continued on CiproHC (9/5 - 9/16) and Bradford (9/1-10/1)   - Case discussed with ENT at length and now continued on acetic acid drops BID to left ear and bactroban ointment to left pinna BID.   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   - Patient with tongue pain and seen with anterior ulcerations consolidating and crusting and posterior ulceration.  - Case discussed with pathology resident and culture/ cytology sent.   - HSV negative and Path (9/6) with normal appearing epithelial cells singly and in clusters devoid of viral cytopathic effect.   - Continue on magic mouth wash for pain    RESPIRATORY  # AHHRF second to volume overload   - Hx of CKD and HFpEF presented with SOB and hypercarbia second to volume overload requiring intubation and HD initiation   - Vent weaning attempted however limited requiring tracheostomy (9/1) with IP   - Course complicated by PIPs elevated and found with tracheomalacia.   - CT CHEST (9/8) with tracheomalacia, BL pleural effusions and continued consolidations   - Continue on vent and given TBM increased PEEP (9/9) to 20/300/8/40 with improvement   - Continue on duonebs for airway clearance   - SBT when mentation improves.      GI  # UGIB   - CGE x 1 episode on 8/24 and melena x 2 episodes on 8/31 likely residual blood.   - EGD (8/31) found with esophagitis and erosive gastropathy  - Continue on PPI BID through late October and then PPI QD     # Dysphagia   - NGT-TF continued   - Pending PEG however needs improvement prior to placement.     RENAL  # Progressive CKD   - Presented volume overload and progressive RUSS on CKD   - POCUS with no signs of hydronephrosis in ICU  - Pressor support started with improvement in renal function in ICU  - Attempted steroid course in ICU without improvement in renal function   - Case discussed at length with renal and initiated on HD in ICU   - Remain on HD while in RCU however noted to be volume overloaded. Attempted Bumex pushes and patient noted with good UOP.   - Attempted renal recovery in RCU however noted with decent UOP, but BUN/ CRE continued to rise without improvement on diuresis.   - Ultimately resumed on HD (9/27, 9/28, 9/30 and 10/2)   - Continue on HD with aggressive UF as tolerated per Renal  - Plan for IR Permacath on Tues 10/10    # Hyponatremia   - Continue on salt tabs 2G    - Monitor sodium     # Hyperphosphatemia to Hypophosphatemia with HD  - PTH normal and elevated phos likely from renal failure  - Phos binder with Renvela started with improvement however then noted with hypophosphatemia and Renvela stopped.     INFECTIOUS DISEASE  # ESBL ECOLI and MSSA VAP c/b MSSA bacteremia   - RVP/ COVID (8/11) negative   - SCx (8/11) with MSSA s/p cefepime (8/12-8/13) and then ancef (8/14) however noted with drug reaction and then changed to vanco and aztreonam (8/12-8/13) in ICU .   - Course complicated by recurrent fevers and return of MSSA with bacteremia.   - SCx (9/1) with MSSA and ESBL ECOLI   - BAL (9/1) with MSSA   - BCx (9/1) with MSSA and cleared on (9/4)   - ECHO (9/6) unable to rule out endocarditis   - Continue on Vanco (9/4-9/22) however noted with rashes of uncertain etiology and replaced with Bradford (9/4 - 10/2) and DAPTO (9/26-10/2) for  completion.     # Proteus and  PSA VAP/ Trachitis   - Continued fevers and SCx (9/29) with  PSA and Proteus   - Continued on Bradford as above however noted to be resistant and now changed to Zosyn (10/2 - ). MONITOR CLOSELY GIVEN DRUG RXN IN PAST!  - Rash developed with Zosyn overnight (10/2) and abx switched to Aztreonam (10/3 - ) based on cultures.   - Will repeat SCX (10/3)    # MRSA PCRs   - MRSA PCR (8/11 and 8/14) negative   - MRSA PCR (9/10) with MSSA and s/p bactroban in ICU   - MRSA PCR (9/26) with MSSA and s/p bactroban in ICU   - Next MRSA PCR (10/22)     HEME  # Anemia second to GIB vs renal disease   - s/p multiple units of PRBCs   - Anemia panel with AOCD but with low %sat and ferrous sulfate added to optimize   - Despite iron intermittent anemia noted without bleeding source and EPO added.   - HH dropped (10/2) and s/p 1U PRBC.   - S/P 1/2u PRBC (10/3) with good response.  - Monitor HH     VASCULAR   # LLE POP DVT   - US BL LE (9/10) with acute left deep vein thrombosis of the left popliteal vein.  - RUE swollen and pending VA DOPPLER.   - Continue on Eliquis     ONC   # Hx of Breast CA   - Patient dx in 2018 and s/p BL mastectomy (radical on right) and chemo and RT.   - Supportive care.     ENDOCRINE  # IDDM2   - Continue on NPH 3U and ISS   - Monitor FS and adjust as needed     LINES/ TUBES  - PACO TAYLOR (9/27 - )     SKIN  # Drug Eruption   - Attempted cefepime (8/12) vs ancef (8/13-8/14) and then noted with drug rxn.   - Hold further cephalosporins at this time   - Continue on steroids with prednisone 40 (8/18 - 9/2) then prednisone 30 (9/3-9/7), then prednisone 20 (9/8 - 9/10), then prednisone 10mg (9/11-9/13) then turn off.   - Monitor for further drug rxns.     ETHICS/ GOC    - FULL CODE     DISPO - TBD

## 2023-10-05 NOTE — EEG REPORT - NS EEG TEXT BOX
MILANA BALL [id number]     Study Date: 10/4/23 15:57 - 10/5/23 08:00  Duration: 16 hr 2 min  --------------------------------------------------------------------------------------------------  History:  CC/ HPI Patient is a 73y old  Female who presents with a chief complaint of Respiratory distress (05 Oct 2023 07:34)    MEDICATIONS  (STANDING):  albuterol/ipratropium for Nebulization 3 milliLiter(s) Nebulizer every 6 hours  apixaban 5 milliGRAM(s) Oral two times a day  artificial tears (preservative free) Ophthalmic Solution 1 Drop(s) Both EYES every 4 hours  aspirin  chewable 81 milliGRAM(s) Enteral Tube daily  atorvastatin 10 milliGRAM(s) Oral at bedtime  aztreonam  IVPB 2000 milliGRAM(s) IV Intermittent <User Schedule>  chlorhexidine 0.12% Liquid 15 milliLiter(s) Oral Mucosa every 12 hours  chlorhexidine 2% Cloths 1 Application(s) Topical daily  dextrose 5%. 1000 milliLiter(s) (50 mL/Hr) IV Continuous <Continuous>  dextrose 5%. 1000 milliLiter(s) (100 mL/Hr) IV Continuous <Continuous>  dextrose 50% Injectable 12.5 Gram(s) IV Push once  dextrose 50% Injectable 25 Gram(s) IV Push once  dextrose 50% Injectable 25 Gram(s) IV Push once  epoetin odalis (EPOGEN) Injectable 30510 Unit(s) IV Push <User Schedule>  ferrous    sulfate Liquid 300 milliGRAM(s) Oral daily  glucagon  Injectable 1 milliGRAM(s) IntraMuscular once  insulin lispro (ADMELOG) corrective regimen sliding scale   SubCutaneous every 6 hours  insulin NPH human recombinant 11 Unit(s) SubCutaneous every 6 hours  midodrine. 30 milliGRAM(s) Oral every 8 hours  pantoprazole  Injectable 40 milliGRAM(s) IV Push two times a day  petrolatum Ophthalmic Ointment 1 Application(s) Both EYES two times a day  sodium chloride 2 Gram(s) Oral two times a day  sodium chloride 3%  Inhalation 4 milliLiter(s) Inhalation every 6 hours  valACYclovir 500 milliGRAM(s) Oral every 24 hours    --------------------------------------------------------------------------------------------------  Study Interpretation:    [[[Abbreviation Key:  PDR=alpha rhythm/posterior dominant rhythm. A-P=anterior posterior.  Amplitude: ‘very low’:<20; ‘low’:20-49; ‘medium’:; ‘high’:>150uV.  Persistence for periodic/rhythmic patterns (% of epoch) ‘rare’:<1%; ‘occasional’:1-10%; ‘frequent’:10-50%; ‘abundant’:50-90%; ‘continuous’:>90%.  Persistence for sporadic discharges: ‘rare’:<1/hr; ‘occasional’:1/min-1/hr; ‘frequent’:>1/min; ‘abundant’:>1/10 sec.  RPP=rhythmic and periodic patterns; GRDA=generalized rhythmic delta activity; FIRDA=frontal intermittent GRDA; LRDA=lateralized rhythmic delta activity; TIRDA=temporal intermittent rhythmic delta activity;  LPD=PLED=lateralized periodic discharges; GPD=generalized periodic discharges; BIPDs =bilateral independent periodic discharges; Mf=multifocal; SIRPDs=stimulus induced rhythmic, periodic, or ictal appearing discharges; BIRDs=brief potentially ictal rhythmic discharges >4 Hz, lasting .5-10s; PFA (paroxysmal bursts >13 Hz or =8 Hz <10s).  Modifiers: +F=with fast component; +S=with spike component; +R=with rhythmic component.  S-B=burst suppression pattern.  Max=maximal. N1-drowsy; N2-stage II sleep; N3-slow wave sleep. SSS/BETS=small sharp spikes/benign epileptiform transients of sleep. HV=hyperventilation; PS=photic stimulation]]]    Daily EEG Visual Analysis    FINDINGS:      Background:  The background is continuous and symmetric. The predominant background in the most wakeful state consists of diffuse polymorphic theta, alpha, and delta frequencies. There is no posterior dominant rhythm. There is occasional generalized rhythmic delta activity at 1 Hz.    Background Slowing:  Generalized slowing: As above  Focal slowing: No clear focal slowing    State Changes:   Drowsiness is characterized by fragmentation, attenuation, and slowing of the background activity.  Rudimentary stage 2 sleep is characterized by rudimentary symmetric sleep spindles.     Interictal Findings:  Frequent left posterior temporal (T7/P7, at times with field to C3/P3) spike/sharp waves, occasionally periodic at 0.5-1 Hz.  Occasional right central (C4) spike/sharp waves, rarely semi-periodic at 1 Hz.  Occasional right parietal (P4) spike/sharp waves.  Occasional bilateral posterior quadrant sharp waves.  Occasional bilateral frontal sharp waves.  Occasional left frontal (Fp1/F3) and right frontal (Fp2/F4) independent sharp waves.    Electrographic and Electroclinical seizures:  None    Other Clinical Events:  None    Activation Procedures:   Hyperventilation is not performed.    Photic stimulation is performed and does not elicit any abnormalities or change in the background.      Artifacts:  Intermittent myogenic and movement artifacts are present.    EKG:  Single-lead EKG shows regular rhythm.    EEG Classification / Summary:  Abnormal EEG in the awake, drowsy, and asleep states.   -Frequent left posterior temporal spike/sharp waves, occasionally periodic at 0.5-1 Hz  -Occasional right central, right parietal, and independent left and right frontal spike/sharp waves  -Occasional bilateral posterior quadrant and bilateral frontal sharp waves  -Moderate diffuse slowing  -No electrographic seizures    Clinical Impression:  -Risk of focal-onset seizures from multiple locations, most prominent in the left posterior temporal region  -Possible additional risk of generalized seizures  -Moderate diffuse cerebral dysfunction is nonspecific in etiology.   -No seizures are captured              -------------------------------------------------------------------------------------------------------  [facility] EEG Reading Room Ph#: [phone]  Epilepsy Answering Service after 5PM and before 8:30AM: Ph#: [phone]    Michelle Lopez MD  Attending Physician, [facility] Epilepsy [place]   MILANA BALL [id number]     Study Date: 10/4/23 15:57 - 10/5/23 08:00  Duration: 16 hr 2 min  --------------------------------------------------------------------------------------------------  History:  CC/ HPI Patient is a 73y old  Female who presents with a chief complaint of Respiratory distress (05 Oct 2023 07:34)    MEDICATIONS  (STANDING):  albuterol/ipratropium for Nebulization 3 milliLiter(s) Nebulizer every 6 hours  apixaban 5 milliGRAM(s) Oral two times a day  artificial tears (preservative free) Ophthalmic Solution 1 Drop(s) Both EYES every 4 hours  aspirin  chewable 81 milliGRAM(s) Enteral Tube daily  atorvastatin 10 milliGRAM(s) Oral at bedtime  aztreonam  IVPB 2000 milliGRAM(s) IV Intermittent <User Schedule>  chlorhexidine 0.12% Liquid 15 milliLiter(s) Oral Mucosa every 12 hours  chlorhexidine 2% Cloths 1 Application(s) Topical daily  dextrose 5%. 1000 milliLiter(s) (50 mL/Hr) IV Continuous <Continuous>  dextrose 5%. 1000 milliLiter(s) (100 mL/Hr) IV Continuous <Continuous>  dextrose 50% Injectable 12.5 Gram(s) IV Push once  dextrose 50% Injectable 25 Gram(s) IV Push once  dextrose 50% Injectable 25 Gram(s) IV Push once  epoetin odalis (EPOGEN) Injectable 47610 Unit(s) IV Push <User Schedule>  ferrous    sulfate Liquid 300 milliGRAM(s) Oral daily  glucagon  Injectable 1 milliGRAM(s) IntraMuscular once  insulin lispro (ADMELOG) corrective regimen sliding scale   SubCutaneous every 6 hours  insulin NPH human recombinant 11 Unit(s) SubCutaneous every 6 hours  midodrine. 30 milliGRAM(s) Oral every 8 hours  pantoprazole  Injectable 40 milliGRAM(s) IV Push two times a day  petrolatum Ophthalmic Ointment 1 Application(s) Both EYES two times a day  sodium chloride 2 Gram(s) Oral two times a day  sodium chloride 3%  Inhalation 4 milliLiter(s) Inhalation every 6 hours  valACYclovir 500 milliGRAM(s) Oral every 24 hours    --------------------------------------------------------------------------------------------------  Study Interpretation:    [[[Abbreviation Key:  PDR=alpha rhythm/posterior dominant rhythm. A-P=anterior posterior.  Amplitude: ‘very low’:<20; ‘low’:20-49; ‘medium’:; ‘high’:>150uV.  Persistence for periodic/rhythmic patterns (% of epoch) ‘rare’:<1%; ‘occasional’:1-10%; ‘frequent’:10-50%; ‘abundant’:50-90%; ‘continuous’:>90%.  Persistence for sporadic discharges: ‘rare’:<1/hr; ‘occasional’:1/min-1/hr; ‘frequent’:>1/min; ‘abundant’:>1/10 sec.  RPP=rhythmic and periodic patterns; GRDA=generalized rhythmic delta activity; FIRDA=frontal intermittent GRDA; LRDA=lateralized rhythmic delta activity; TIRDA=temporal intermittent rhythmic delta activity;  LPD=PLED=lateralized periodic discharges; GPD=generalized periodic discharges; BIPDs =bilateral independent periodic discharges; Mf=multifocal; SIRPDs=stimulus induced rhythmic, periodic, or ictal appearing discharges; BIRDs=brief potentially ictal rhythmic discharges >4 Hz, lasting .5-10s; PFA (paroxysmal bursts >13 Hz or =8 Hz <10s).  Modifiers: +F=with fast component; +S=with spike component; +R=with rhythmic component.  S-B=burst suppression pattern.  Max=maximal. N1-drowsy; N2-stage II sleep; N3-slow wave sleep. SSS/BETS=small sharp spikes/benign epileptiform transients of sleep. HV=hyperventilation; PS=photic stimulation]]]    Daily EEG Visual Analysis    FINDINGS:      Background:  The background is continuous and symmetric. The predominant background in the most wakeful state consists of diffuse polymorphic theta, alpha, and delta frequencies. There is no posterior dominant rhythm. There is occasional generalized rhythmic delta activity at 1 Hz.    Background Slowing:  Generalized slowing: As above  Focal slowing: No clear focal slowing    State Changes:   Drowsiness is characterized by fragmentation, attenuation, and slowing of the background activity.  Rudimentary stage 2 sleep is characterized by rudimentary symmetric sleep spindles.     Interictal Findings:  Frequent left posterior temporal/posterocentral (T7/P7, at times with field to C3/P3 or shifting to C3/P3) spike/sharp waves, occasionally periodic at 0.5-1 Hz.  Occasional right central (C4) spike/sharp waves, rarely semi-periodic at 1 Hz.  Occasional right parietal (P4) spike/sharp waves.  Occasional bilateral posterior quadrant sharp waves.  Occasional bilateral frontal sharp waves.  Occasional left frontal (Fp1/F3) and right frontal (Fp2/F4) independent sharp waves.    Electrographic and Electroclinical seizures:  Event button press 16:07 10/4/23 for right upper extremity twitching, which may be time-locked to left posterocentral/temporal (C3/P3/T7) spikes/sharp waves.    Other Clinical Events:  None    Activation Procedures:   Hyperventilation is not performed.    Photic stimulation is performed and does not elicit any abnormalities or change in the background.      Artifacts:  Intermittent myogenic and movement artifacts are present.    EKG:  Single-lead EKG shows regular rhythm.    EEG Classification / Summary:  Abnormal EEG in the awake, drowsy, and asleep states.   -Event of right upper extremity twitching, which may be time-locked to left posterocentral/temporal spikes/sharp waves.  -Frequent left posterior temporal spike/sharp waves, occasionally periodic at 0.5-1 Hz  -Occasional right central, right parietal, and independent left and right frontal spike/sharp waves  -Occasional bilateral posterior quadrant and bilateral frontal sharp waves  -Moderate diffuse slowing    Clinical Impression:  -Possible focal seizure with right upper extremity twitching 10/4/23 16:07  -Risk of focal-onset seizures from multiple locations, most prominent in the left posterior temporal region  -Possible additional risk of generalized seizures  -Moderate diffuse cerebral dysfunction is nonspecific in etiology.             -------------------------------------------------------------------------------------------------------  [facility] EEG Reading Room Ph#: [phone]  Epilepsy Answering Service after 5PM and before 8:30AM: Ph#: [phone]    Michelle Lopez MD  Attending Physician, [facility] Epilepsy [place]   MILANA BALL [id number]     Study Date: 10/4/23 15:57 - 10/5/23 08:00  Duration: 16 hr 2 min  --------------------------------------------------------------------------------------------------  History:  CC/ HPI Patient is a 73y old  Female who presents with a chief complaint of Respiratory distress (05 Oct 2023 07:34)    MEDICATIONS  (STANDING):  albuterol/ipratropium for Nebulization 3 milliLiter(s) Nebulizer every 6 hours  apixaban 5 milliGRAM(s) Oral two times a day  artificial tears (preservative free) Ophthalmic Solution 1 Drop(s) Both EYES every 4 hours  aspirin  chewable 81 milliGRAM(s) Enteral Tube daily  atorvastatin 10 milliGRAM(s) Oral at bedtime  aztreonam  IVPB 2000 milliGRAM(s) IV Intermittent <User Schedule>  chlorhexidine 0.12% Liquid 15 milliLiter(s) Oral Mucosa every 12 hours  chlorhexidine 2% Cloths 1 Application(s) Topical daily  dextrose 5%. 1000 milliLiter(s) (50 mL/Hr) IV Continuous <Continuous>  dextrose 5%. 1000 milliLiter(s) (100 mL/Hr) IV Continuous <Continuous>  dextrose 50% Injectable 12.5 Gram(s) IV Push once  dextrose 50% Injectable 25 Gram(s) IV Push once  dextrose 50% Injectable 25 Gram(s) IV Push once  epoetin odalis (EPOGEN) Injectable 11039 Unit(s) IV Push <User Schedule>  ferrous    sulfate Liquid 300 milliGRAM(s) Oral daily  glucagon  Injectable 1 milliGRAM(s) IntraMuscular once  insulin lispro (ADMELOG) corrective regimen sliding scale   SubCutaneous every 6 hours  insulin NPH human recombinant 11 Unit(s) SubCutaneous every 6 hours  midodrine. 30 milliGRAM(s) Oral every 8 hours  pantoprazole  Injectable 40 milliGRAM(s) IV Push two times a day  petrolatum Ophthalmic Ointment 1 Application(s) Both EYES two times a day  sodium chloride 2 Gram(s) Oral two times a day  sodium chloride 3%  Inhalation 4 milliLiter(s) Inhalation every 6 hours  valACYclovir 500 milliGRAM(s) Oral every 24 hours    --------------------------------------------------------------------------------------------------  Study Interpretation:    [[[Abbreviation Key:  PDR=alpha rhythm/posterior dominant rhythm. A-P=anterior posterior.  Amplitude: ‘very low’:<20; ‘low’:20-49; ‘medium’:; ‘high’:>150uV.  Persistence for periodic/rhythmic patterns (% of epoch) ‘rare’:<1%; ‘occasional’:1-10%; ‘frequent’:10-50%; ‘abundant’:50-90%; ‘continuous’:>90%.  Persistence for sporadic discharges: ‘rare’:<1/hr; ‘occasional’:1/min-1/hr; ‘frequent’:>1/min; ‘abundant’:>1/10 sec.  RPP=rhythmic and periodic patterns; GRDA=generalized rhythmic delta activity; FIRDA=frontal intermittent GRDA; LRDA=lateralized rhythmic delta activity; TIRDA=temporal intermittent rhythmic delta activity;  LPD=PLED=lateralized periodic discharges; GPD=generalized periodic discharges; BIPDs =bilateral independent periodic discharges; Mf=multifocal; SIRPDs=stimulus induced rhythmic, periodic, or ictal appearing discharges; BIRDs=brief potentially ictal rhythmic discharges >4 Hz, lasting .5-10s; PFA (paroxysmal bursts >13 Hz or =8 Hz <10s).  Modifiers: +F=with fast component; +S=with spike component; +R=with rhythmic component.  S-B=burst suppression pattern.  Max=maximal. N1-drowsy; N2-stage II sleep; N3-slow wave sleep. SSS/BETS=small sharp spikes/benign epileptiform transients of sleep. HV=hyperventilation; PS=photic stimulation]]]    Daily EEG Visual Analysis    FINDINGS:      Background:  The background is continuous and symmetric. The predominant background in the most wakeful state consists of diffuse polymorphic theta, alpha, and delta frequencies. There is no posterior dominant rhythm. There is occasional generalized rhythmic delta activity at 1 Hz.    Background Slowing:  Generalized slowing: As above  Focal slowing: No clear focal slowing    State Changes:   Drowsiness is characterized by fragmentation, attenuation, and slowing of the background activity.  Rudimentary stage 2 sleep is characterized by rudimentary symmetric sleep spindles.     Interictal Findings:  Frequent left posterior temporal/posterocentral (T7/P7, at times with field to C3/P3 or shifting to C3/P3) spike/sharp waves, occasionally periodic at 0.5-1 Hz.  Occasional right central (C4) spike/sharp waves, rarely semi-periodic at 1 Hz.  Occasional right parietal (P4) spike/sharp waves.  Occasional bilateral posterior quadrant sharp waves.  Occasional bilateral frontal sharp waves.  Occasional left frontal (Fp1/F3) and right frontal (Fp2/F4) independent sharp waves.    Electrographic and Electroclinical seizures:  Event button press 16:07 10/4/23 for right upper extremity twitching, which may be time-locked to left posterocentral/temporal (C3/P3/T7) spikes/sharp waves, but not certain.    Other Clinical Events:  None    Activation Procedures:   Hyperventilation is not performed.    Photic stimulation is performed and does not elicit any abnormalities or change in the background.      Artifacts:  Intermittent myogenic and movement artifacts are present.    EKG:  Single-lead EKG shows regular rhythm.    EEG Classification / Summary:  Abnormal EEG in the awake, drowsy, and asleep states.   -Event of right upper extremity twitching, which may be time-locked to left posterocentral/temporal spikes/sharp waves.  -Frequent left posterior temporal spike/sharp waves, occasionally periodic at 0.5-1 Hz  -Occasional right central, right parietal, and independent left and right frontal spike/sharp waves  -Occasional bilateral posterior quadrant and bilateral frontal sharp waves  -Moderate diffuse slowing    Clinical Impression:  -Possible focal seizure with right upper extremity twitching 10/4/23 16:07  -Risk of focal-onset seizures from multiple locations, most prominent in the left posterior temporal region  -Possible additional risk of generalized seizures  -Moderate diffuse cerebral dysfunction is nonspecific in etiology.             -------------------------------------------------------------------------------------------------------  [facility] EEG Reading Room Ph#: [phone]  Epilepsy Answering Service after 5PM and before 8:30AM: Ph#: [phone]    Michelle Lopez MD  Attending Physician, [facility] Epilepsy [place]   MILANA BALL [id number]     Study Date: 10/4/23 15:57 - 10/5/23 08:00  Duration: 16 hr 2 min  --------------------------------------------------------------------------------------------------  History:  CC/ HPI Patient is a 73y old  Female who presents with a chief complaint of Respiratory distress (05 Oct 2023 07:34)    MEDICATIONS  (STANDING):  albuterol/ipratropium for Nebulization 3 milliLiter(s) Nebulizer every 6 hours  apixaban 5 milliGRAM(s) Oral two times a day  artificial tears (preservative free) Ophthalmic Solution 1 Drop(s) Both EYES every 4 hours  aspirin  chewable 81 milliGRAM(s) Enteral Tube daily  atorvastatin 10 milliGRAM(s) Oral at bedtime  aztreonam  IVPB 2000 milliGRAM(s) IV Intermittent <User Schedule>  chlorhexidine 0.12% Liquid 15 milliLiter(s) Oral Mucosa every 12 hours  chlorhexidine 2% Cloths 1 Application(s) Topical daily  dextrose 5%. 1000 milliLiter(s) (50 mL/Hr) IV Continuous <Continuous>  dextrose 5%. 1000 milliLiter(s) (100 mL/Hr) IV Continuous <Continuous>  dextrose 50% Injectable 12.5 Gram(s) IV Push once  dextrose 50% Injectable 25 Gram(s) IV Push once  dextrose 50% Injectable 25 Gram(s) IV Push once  epoetin odalis (EPOGEN) Injectable 59999 Unit(s) IV Push <User Schedule>  ferrous    sulfate Liquid 300 milliGRAM(s) Oral daily  glucagon  Injectable 1 milliGRAM(s) IntraMuscular once  insulin lispro (ADMELOG) corrective regimen sliding scale   SubCutaneous every 6 hours  insulin NPH human recombinant 11 Unit(s) SubCutaneous every 6 hours  midodrine. 30 milliGRAM(s) Oral every 8 hours  pantoprazole  Injectable 40 milliGRAM(s) IV Push two times a day  petrolatum Ophthalmic Ointment 1 Application(s) Both EYES two times a day  sodium chloride 2 Gram(s) Oral two times a day  sodium chloride 3%  Inhalation 4 milliLiter(s) Inhalation every 6 hours  valACYclovir 500 milliGRAM(s) Oral every 24 hours    --------------------------------------------------------------------------------------------------  Study Interpretation:    [[[Abbreviation Key:  PDR=alpha rhythm/posterior dominant rhythm. A-P=anterior posterior.  Amplitude: ‘very low’:<20; ‘low’:20-49; ‘medium’:; ‘high’:>150uV.  Persistence for periodic/rhythmic patterns (% of epoch) ‘rare’:<1%; ‘occasional’:1-10%; ‘frequent’:10-50%; ‘abundant’:50-90%; ‘continuous’:>90%.  Persistence for sporadic discharges: ‘rare’:<1/hr; ‘occasional’:1/min-1/hr; ‘frequent’:>1/min; ‘abundant’:>1/10 sec.  RPP=rhythmic and periodic patterns; GRDA=generalized rhythmic delta activity; FIRDA=frontal intermittent GRDA; LRDA=lateralized rhythmic delta activity; TIRDA=temporal intermittent rhythmic delta activity;  LPD=PLED=lateralized periodic discharges; GPD=generalized periodic discharges; BIPDs =bilateral independent periodic discharges; Mf=multifocal; SIRPDs=stimulus induced rhythmic, periodic, or ictal appearing discharges; BIRDs=brief potentially ictal rhythmic discharges >4 Hz, lasting .5-10s; PFA (paroxysmal bursts >13 Hz or =8 Hz <10s).  Modifiers: +F=with fast component; +S=with spike component; +R=with rhythmic component.  S-B=burst suppression pattern.  Max=maximal. N1-drowsy; N2-stage II sleep; N3-slow wave sleep. SSS/BETS=small sharp spikes/benign epileptiform transients of sleep. HV=hyperventilation; PS=photic stimulation]]]    Daily EEG Visual Analysis    FINDINGS:      Background:  The background is continuous and symmetric. The predominant background in the most wakeful state consists of diffuse polymorphic theta, alpha, and delta frequencies. There is no posterior dominant rhythm. There is occasional generalized rhythmic delta activity at 1 Hz.    Background Slowing:  Generalized slowing: As above  Focal slowing: No clear focal slowing    State Changes:   Drowsiness is characterized by fragmentation, attenuation, and slowing of the background activity.  Rudimentary stage 2 sleep is characterized by rudimentary symmetric sleep spindles.     Interictal Findings:  Frequent left posterior temporal/posterocentral (T7/P7, at times with field to C3/P3 or shifting to C3/P3) spike/sharp waves, occasionally periodic at 0.5-1 Hz.  Occasional right central (C4) spike/sharp waves, rarely semi-periodic at 1 Hz.  Occasional right parietal (P4) spike/sharp waves.  Occasional bilateral posterior quadrant sharp waves.  Occasional bilateral frontal sharp waves.  Occasional left frontal (Fp1/F3) and right frontal (Fp2/F4) independent sharp waves.    Electrographic and Electroclinical seizures:  Event button press 16:07 10/4/23 for right upper extremity twitching, which may be time-locked to left posterocentral/temporal (C3/P3/T7) spikes/sharp waves.    Other Clinical Events:  None    Activation Procedures:   Hyperventilation is not performed.    Photic stimulation is performed and does not elicit any abnormalities or change in the background.      Artifacts:  Intermittent myogenic and movement artifacts are present.    EKG:  Single-lead EKG shows regular rhythm.    EEG Classification / Summary:  Abnormal EEG in the awake, drowsy, and asleep states.   -Event of right upper extremity twitching, which may be time-locked to left posterocentral/temporal spikes/sharp waves.  -Frequent left posterior temporal spike/sharp waves, occasionally periodic at 0.5-1 Hz  -Occasional right central, right parietal, and independent left and right frontal spike/sharp waves  -Occasional bilateral posterior quadrant and bilateral frontal sharp waves  -Moderate diffuse slowing    Clinical Impression:  -Possible focal seizure with right upper extremity twitching 10/4/23 16:07  -Risk of focal-onset seizures from multiple locations, most prominent in the left posterior temporal region  -Possible additional risk of generalized seizures  -Moderate diffuse cerebral dysfunction is nonspecific in etiology.             -------------------------------------------------------------------------------------------------------  [facility] EEG Reading Room Ph#: [phone]  Epilepsy Answering Service after 5PM and before 8:30AM: Ph#: [phone]    Michelle Lopez MD  Attending Physician, [facility] [place]   MILANA BALL [id number]     Study Date: 10/4/23 15:57 - 10/5/23 08:00  Duration: 16 hr 2 min  --------------------------------------------------------------------------------------------------  History:  CC/ HPI Patient is a 73y old  Female who presents with a chief complaint of Respiratory distress (05 Oct 2023 07:34)    MEDICATIONS  (STANDING):  albuterol/ipratropium for Nebulization 3 milliLiter(s) Nebulizer every 6 hours  apixaban 5 milliGRAM(s) Oral two times a day  artificial tears (preservative free) Ophthalmic Solution 1 Drop(s) Both EYES every 4 hours  aspirin  chewable 81 milliGRAM(s) Enteral Tube daily  atorvastatin 10 milliGRAM(s) Oral at bedtime  aztreonam  IVPB 2000 milliGRAM(s) IV Intermittent <User Schedule>  chlorhexidine 0.12% Liquid 15 milliLiter(s) Oral Mucosa every 12 hours  chlorhexidine 2% Cloths 1 Application(s) Topical daily  dextrose 5%. 1000 milliLiter(s) (50 mL/Hr) IV Continuous <Continuous>  dextrose 5%. 1000 milliLiter(s) (100 mL/Hr) IV Continuous <Continuous>  dextrose 50% Injectable 12.5 Gram(s) IV Push once  dextrose 50% Injectable 25 Gram(s) IV Push once  dextrose 50% Injectable 25 Gram(s) IV Push once  epoetin odalis (EPOGEN) Injectable 11275 Unit(s) IV Push <User Schedule>  ferrous    sulfate Liquid 300 milliGRAM(s) Oral daily  glucagon  Injectable 1 milliGRAM(s) IntraMuscular once  insulin lispro (ADMELOG) corrective regimen sliding scale   SubCutaneous every 6 hours  insulin NPH human recombinant 11 Unit(s) SubCutaneous every 6 hours  midodrine. 30 milliGRAM(s) Oral every 8 hours  pantoprazole  Injectable 40 milliGRAM(s) IV Push two times a day  petrolatum Ophthalmic Ointment 1 Application(s) Both EYES two times a day  sodium chloride 2 Gram(s) Oral two times a day  sodium chloride 3%  Inhalation 4 milliLiter(s) Inhalation every 6 hours  valACYclovir 500 milliGRAM(s) Oral every 24 hours    --------------------------------------------------------------------------------------------------  Study Interpretation:    [[[Abbreviation Key:  PDR=alpha rhythm/posterior dominant rhythm. A-P=anterior posterior.  Amplitude: ‘very low’:<20; ‘low’:20-49; ‘medium’:; ‘high’:>150uV.  Persistence for periodic/rhythmic patterns (% of epoch) ‘rare’:<1%; ‘occasional’:1-10%; ‘frequent’:10-50%; ‘abundant’:50-90%; ‘continuous’:>90%.  Persistence for sporadic discharges: ‘rare’:<1/hr; ‘occasional’:1/min-1/hr; ‘frequent’:>1/min; ‘abundant’:>1/10 sec.  RPP=rhythmic and periodic patterns; GRDA=generalized rhythmic delta activity; FIRDA=frontal intermittent GRDA; LRDA=lateralized rhythmic delta activity; TIRDA=temporal intermittent rhythmic delta activity;  LPD=PLED=lateralized periodic discharges; GPD=generalized periodic discharges; BIPDs =bilateral independent periodic discharges; Mf=multifocal; SIRPDs=stimulus induced rhythmic, periodic, or ictal appearing discharges; BIRDs=brief potentially ictal rhythmic discharges >4 Hz, lasting .5-10s; PFA (paroxysmal bursts >13 Hz or =8 Hz <10s).  Modifiers: +F=with fast component; +S=with spike component; +R=with rhythmic component.  S-B=burst suppression pattern.  Max=maximal. N1-drowsy; N2-stage II sleep; N3-slow wave sleep. SSS/BETS=small sharp spikes/benign epileptiform transients of sleep. HV=hyperventilation; PS=photic stimulation]]]    Daily EEG Visual Analysis    FINDINGS:      Background:  The background is continuous and symmetric. The predominant background in the most wakeful state consists of diffuse polymorphic theta, alpha, and delta frequencies. There is no posterior dominant rhythm. There is occasional generalized rhythmic delta activity at 1 Hz.    Background Slowing:  Generalized slowing: As above  Focal slowing: No clear focal slowing    State Changes:   Drowsiness is characterized by fragmentation, attenuation, and slowing of the background activity.  Rudimentary stage 2 sleep is characterized by rudimentary symmetric sleep spindles.     Interictal Findings:  Frequent left posterior temporal/posterocentral (T7/P7, at times with field to C3/P3 or shifting to C3/P3) spike/sharp waves, occasionally periodic at 0.5-1 Hz.  Occasional right central (C4) spike/sharp waves, rarely semi-periodic at 1 Hz.  Occasional right parietal (P4) spike/sharp waves.  Occasional bilateral posterior quadrant sharp waves.  Occasional bilateral frontal sharp waves.  Occasional left frontal (Fp1/F3) and right frontal (Fp2/F4) independent sharp waves.    Electrographic and Electroclinical seizures:  Event button press 16:07 10/4/23 for right upper extremity twitching, which may be time-locked to left posterocentral/temporal (C3/P3/T7) spikes/sharp waves.    Other Clinical Events:  None    Activation Procedures:   Hyperventilation is not performed.    Photic stimulation is performed and does not elicit any abnormalities or change in the background.      Artifacts:  Intermittent myogenic and movement artifacts are present.    EKG:  Single-lead EKG shows regular rhythm.    EEG Classification / Summary:  Abnormal EEG in the awake, drowsy, and asleep states.   -Event of right upper extremity twitching, which may be time-locked to left posterocentral/temporal spikes/sharp waves.  -Frequent left posterior temporal/posterocentral spike/sharp waves, occasionally periodic at 0.5-1 Hz  -Occasional right central, right parietal, and independent left and right frontal spike/sharp waves  -Occasional bilateral posterior quadrant and bilateral frontal sharp waves  -Moderate diffuse slowing    Clinical Impression:  -Possible focal seizure with right upper extremity twitching 10/4/23 16:07  -Risk of focal-onset seizures from multiple locations, most prominent in the left posterior temporal/posterocentral region  -Possible additional risk of generalized seizures  -Moderate diffuse cerebral dysfunction is nonspecific in etiology.     Neurology was notified of possible seizure.        -------------------------------------------------------------------------------------------------------  [facility] EEG Reading Room Ph#: [phone]  Epilepsy Answering Service after 5PM and before 8:30AM: Ph#: [phone]    Michelle Lopez MD  Attending Physician, [facility]ile[facility] [place]

## 2023-10-05 NOTE — CHART NOTE - NSCHARTNOTEFT_GEN_A_CORE
ACP NIGHT MEDICINE COVERAGE.    Notified by RN, patient w/ SBP 80's x2. Per chart review pt s/p Stress Steroids (ended 10/4) and continued on Midodrine 30mg PO TID, however did not receive AM or evening dose on 10/4. Midodrine 30mg PO via PEG x1 STAT ordered, will f/u repeat in 1hour. RN aware of plan. Will continue to monitor overnight.    Vital Signs Last 24 Hrs  T(C): 37.2 (05 Oct 2023 01:50), Max: 37.8 (04 Oct 2023 21:57)  T(F): 98.9 (05 Oct 2023 01:50), Max: 100 (04 Oct 2023 21:57)  HR: 99 (05 Oct 2023 01:50) (98 - 124)  BP: 88/41 (05 Oct 2023 01:50) (88/41 - 121/48)  RR: 22 (05 Oct 2023 01:50) (20 - 22)  SpO2: 98% (05 Oct 2023 01:50) (95% - 100%)    Parameters below as of 05 Oct 2023 01:50  Patient On (Oxygen Delivery Method): ventilator, AC    O2 Concentration (%): 40    Matilda Tripathi PA  Medicine [id number]

## 2023-10-06 NOTE — PROGRESS NOTE ADULT - SUBJECTIVE AND OBJECTIVE BOX
NEPHROLOGY     Patient seen and examined with  at bedside, trach to vent, in no acute distress.     MEDICATIONS  (STANDING):  albuterol/ipratropium for Nebulization 3 milliLiter(s) Nebulizer every 6 hours  apixaban 5 milliGRAM(s) Oral every 12 hours  artificial tears (preservative free) Ophthalmic Solution 1 Drop(s) Both EYES every 4 hours  aspirin  chewable 81 milliGRAM(s) Enteral Tube daily  atorvastatin 10 milliGRAM(s) Oral at bedtime  aztreonam  IVPB 2000 milliGRAM(s) IV Intermittent <User Schedule>  chlorhexidine 0.12% Liquid 15 milliLiter(s) Oral Mucosa every 12 hours  chlorhexidine 2% Cloths 1 Application(s) Topical daily  dextrose 5%. 1000 milliLiter(s) (50 mL/Hr) IV Continuous <Continuous>  dextrose 5%. 1000 milliLiter(s) (100 mL/Hr) IV Continuous <Continuous>  dextrose 50% Injectable 12.5 Gram(s) IV Push once  dextrose 50% Injectable 25 Gram(s) IV Push once  dextrose 50% Injectable 25 Gram(s) IV Push once  dextrose 50% Injectable 25 Gram(s) IV Push once  epoetin odalis (EPOGEN) Injectable 42369 Unit(s) IV Push <User Schedule>  ferrous    sulfate Liquid 300 milliGRAM(s) Oral daily  glucagon  Injectable 1 milliGRAM(s) IntraMuscular once  insulin lispro (ADMELOG) corrective regimen sliding scale   SubCutaneous every 6 hours  levETIRAcetam  Solution 1000 milliGRAM(s) Oral daily  levETIRAcetam  Solution 250 milliGRAM(s) Oral <User Schedule>  melatonin 3 milliGRAM(s) Oral at bedtime  midodrine. 30 milliGRAM(s) Oral every 8 hours  pantoprazole  Injectable 40 milliGRAM(s) IV Push two times a day  petrolatum Ophthalmic Ointment 1 Application(s) Both EYES two times a day  sodium chloride 1 Gram(s) Oral two times a day  sodium chloride 3%  Inhalation 4 milliLiter(s) Inhalation every 6 hours  valACYclovir 500 milliGRAM(s) Oral every 24 hours    VITALS:  T(C): , Max: 38 (10-05-23 @ 23:00)  T(F): , Max: 100.4 (10-05-23 @ 23:00)  HR: 103 (10-06-23 @ 10:01)  BP: 120/43 (10-06-23 @ 10:01)  BP(mean): 65 (10-06-23 @ 00:00)  RR: 19 (10-06-23 @ 10:01)  SpO2: 99% (10-06-23 @ 10:01)    I and O's:    10-05 @ 07:01  -  10-06 @ 07:00  --------------------------------------------------------  IN: 575 mL / OUT: 0 mL / NET: 575 mL    PHYSICAL EXAM:  Constitutional: minimally communicative at present; on vent  HEENT: trach-vent, (+)NGT  Respiratory: coarse BS b/l  Cardiovascular: tachy reg s1s2  Gastrointestinal: BS+, soft, NT/ND  Extremities: + peripheral edema  Neurological: tone WNL  Skin: No rashes  Access: (+)[facility]    LABS:                        9.3    12.58 )-----------( 399      ( 05 Oct 2023 05:43 )             29.3     10-05    138  |  98  |  37<H>  ----------------------------<  148<H>  3.1<L>   |  28  |  1.33<H>    Ca    8.5      05 Oct 2023 05:43  Phos  2.2     10-05  Mg     1.90     10-05

## 2023-10-06 NOTE — PROGRESS NOTE ADULT - ASSESSMENT
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 While in MICU patient noted with worsening renal failure requiring HD initiation, CGE/ Melena s/p EGD 8/31 found with esophagitis and erosive gastropathy, new right cerebellar infarct and fevers second to ESBL COLI and MSSA VAP, MSSA bacteremia, left ear otitis externa and drug rxn to cephalosporins. 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 Case discussed at length renal and given good UOP attempted renal recovery, however failed, and upon reinitiation of HD attempt. R IJ SHILEY failed. Attempted volume removal with Bumex GTT however course complicated by hypotension requiring transfer back to MICU 9/26 for pressor support. Diuresis dc'ed, pressors weaned off and stress steroids started. L IJ SHILEY placed (9/30) and HD reinstated. Course further complicated by AOCD and  PSA and Proteus VAP. Patient transferred back to RCU 10/1 with course complicated by volume overload and grossly resistant organisms.    NEUROLOGY  # NEW cerebella infarct   - Baseline MS AOX3 with short term memory changes per family however noted with concern for poor mentation in ICU  - MRI (8/17) with NEW right cerebellar infarct and old left PCA/occipital infarcts  - STEPHANIE (8/17) with AV showing two linear mobile echogenic elements attached to valve leaflets consistent with Lambel's excrescence, but no TRINITY thrombus, and lambel's excrescence with uncertain clinical implication.   - EEG (8/11-8/15) with no seizures   - ILR interrogation (9/7) with SR and PACs falsely recorded as AF.   - Continue on ASA and lipitor for medical management     # Seizures   - Course complicated by questionable head and body shaking with prolactin elevated 9/8. Discussed for spot EEG however held given low likelihood of seizures noted and acute respiratory illnesses   - Course further complicated with right arm twitching and RPT EEG (10/4) with no seizure however but noted with increase seizure potential in left posterior quadrants.   - RPT EEG (10/5) with possible focal seizures and risk of focal-onset seizures from multiple locations, most prominent in the left posterior temporal/posterocentral region  - RPT EEG (10/6) with RUE twitching are likely non-epileptic in nature, however risk of focal-onset seizures from multiple locations  - Continue on Keppra PPX     # Metabolic vs Uremic Encephalopathy   - Lethargy noted with progression to stupor thought to be second to uremia.   - RPT CTH (9/26) with vague areas of hypoattenuation within the bilateral cerebellar hemispheres which may be compatible with acute/subacute ischemia.   - Discussed CTH with neurology and no signs of new infarct noted.   - HD reinstated in ICU with improvement in uremia however mentation remained   - Poor mentation likely in setting of infections.   - Monitor mentation closely     CARDIOVASCULAR  # HTN Urgency   # Septic vs vasoplegic shock   - Labile BPs ranging in between SBP 80s-200s and attempted labetalol, however noted with hypotension and bradycardia likely from BB toxicity vs shock state?    - Holding Labetalol, Catapres and Catapres.    - Attempted volume removal with bumex diuresis however noted with return of shock state requiring pressor support and transfer back to ICU.   - Pressors weaned off to stress dose (last day 10/4) and Midodrine   - Continue on Midodrine 30 TID (9/29 - ) and wean as tolerated however will likely need for adequate volume removal.     # Hx of HFpEF with likely ADHF with volume overload.   - ECHO (7/2023) with EF 59 with severe LVH and diastolic dysfunction   - STEPHANIE (8/18) with normal LVRVSF however noted with increased LA pressures and persistent LA septal bowing into RA.   - ECHO (8/11) with EF 60 with mild LVH, normal LVRVSF, and mild ddfxn.  - Grossly volume overloaded and removing volume with HD sessions     HEENT   # Left ear OE with acute on chronic left-sided otomastoiditis  - ENT evaluation (9/7) with no mastoid tenderness, however found with positive swelling and excoriation to left auricle and tenderness to manipulation of auricle. Debrided crusting of auricle and EAC evaluated with edema and unable to visualize TM. EAC debrided and found with watery exudate.   - CT IAC with acute on chronic left-sided otitis externa and acute on chronic left-sided otomastoiditis. Superimposed left-sided tympanosclerosis. Superimposed cholesteatoma formation within the left tympanomastoid cavity cannot be excluded  - Completed CiproHC (9/5 - 9/16), Bradford (9/1-10/1) and acetic acid and bacitracin.   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   - Continue preservative free artificial tears 4 times a day in the both eye  - Continue lacrilube ointment twice a day in the both eyes   - Continue on empiric valtrex 500mg BID (9/29 - ) per ophtho    # Oral Lesions with questionable Zoster vs Trauma?   - Patient with tongue pain and seen with anterior ulcerations consolidating and crusting and posterior ulceration.  - Case discussed with pathology resident and culture/ cytology sent.   - HSV negative and Path (9/6) with normal appearing epithelial cells singly and in clusters devoid of viral cytopathic effect.   - Continue on magic mouth wash for pain    RESPIRATORY  # AHHRF second to volume overload   - Hx of CKD and HFpEF presented with SOB and hypercarbia second to volume overload requiring intubation and HD initiation   - Vent weaning attempted however limited requiring tracheostomy (9/1) with IP   - Course complicated by PIPs elevated and found with tracheomalacia and volume overloaded.   - CT CHEST (9/8) with tracheomalacia, BL pleural effusions and continued consolidations   - Continue on vent and given TBM increased PEEP to 8 with improvement in dynamic collapse, but PIP waxes and wanes with volume overloaded and secretions. Continue on duonebs and HTS with volume removal with HD   - SBT when mentation improves.      GI  # UGIB   - CGE x 1 episode on 8/24 and melena x 2 episodes on 8/31 likely residual blood.   - EGD (8/31) found with esophagitis and erosive gastropathy  - Continue on PPI BID through late October and then PPI QD     # Dysphagia   - NGT-TF continued   - Pending PEG however needs improvement prior to placement.     RENAL  # Progressive CKD   - Presented volume overload and progressive RUSS on CKD   - POCUS with no signs of hydronephrosis in ICU  - Pressor support started with improvement in renal function in ICU  - Attempted steroid course in ICU without improvement in renal function   - Case discussed at length with renal and initiated on HD in ICU   - Remain on HD while in RCU however noted to be volume overloaded. Attempted Bumex pushes and patient noted with good UOP.   - Attempted renal recovery in RCU however noted with decent UOP, but BUN/ CRE continued to rise without improvement on diuresis.   - Ultimately resumed on HD (9/27, 9/28, 9/30, 10/2 and 10/4).   - Next HD session today however volume overloaded and plan for additional UF session tomorrow     # Hyponatremia   - Continue on salt tabs 1G  (decreased 10/6) and weaning off as tolerated with UF sessions  - Monitor sodium     # Hyperphosphatemia to Hypophosphatemia with HD  - PTH normal and elevated phos likely from renal failure  - Phos binder with Renvela started with improvement however then noted with hypophosphatemia and Renvela stopped.     INFECTIOUS DISEASE  # ESBL ECOLI and MSSA VAP c/b MSSA bacteremia   - RVP/ COVID (8/11) negative   - SCx (8/11) with MSSA s/p cefepime (8/12-8/13) and then ancef (8/14) however noted with drug reaction and then changed to vanco and aztreonam (8/12-8/13) in ICU .   - Course complicated by recurrent fevers and return of MSSA with bacteremia.   - SCx (9/1) with MSSA and ESBL ECOLI   - BAL (9/1) with MSSA   - BCx (9/1) with MSSA and cleared on (9/4)   - ECHO (9/6) unable to rule out endocarditis   - Continue on Vanco (9/4-9/22) however noted with rashes of uncertain etiology and replaced with Bradford (9/4 - 10/2) and DAPTO (9/26-10/2) for  completion.     # Proteus and  PSA VAP/ Trachitis   - Continued fevers and SCx (9/29) with MDR  PSA and Proteus   - Continued on Bradford as above however noted to be resistant and changed to Zosyn (10/2 - 10/5) with course complicated by possible drug rxn. Zosyn changed to Aztreonam (10/5 - ).   - RPT SCx (10/3) with  PSA however only intermediate coverage to aztreonam and amikacin and grossly resistant to other antibiotics. PSA likely colonizer and proteus sensitive to aztreonam on prior culture.     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  - Monitor HH     VASCULAR   # LLE POP DVT   - US BL LE (9/10) with acute left deep vein thrombosis of the left popliteal vein.  - RUE swollen and VA DOPPLER RUE (10/3) with R IJ DVT and R brachial DVT.  - Continue on Eliquis     # HD access  - L IJ CLAUDIA (9/27 - )   - Planned for IR permacath conversion on Tuesday 10/10  - Eliquis to fall off on Saturday (needs to be off x 48 hours prior to procedure).   - Resume Eliquis post procedure.     ONC   # Hx of Breast CA   - Patient dx in 2018 and s/p BL mastectomy (radical on right) and chemo and RT.   - Supportive care.     ENDOCRINE  # IDDM2   - Continue on NPH with ISS, however noted with hypoglycemic episodes requiring LRD5.   - Given hypervolemic will hold further IVF and DC NPH.   - Continue on ISS for now and increase to moderate scale vs reintroducing NPH at low dose if needed.     SKIN  # Drug Eruption   - Attempted cefepime (8/12) vs ancef (8/13-8/14) and then noted with drug rxn in ICU. Continue on steroids with prednisone 40 (8/18 - 9/2) then prednisone 30 (9/3-9/7), then prednisone 20 (9/8 - 9/10), then prednisone 10mg (9/11-9/13) then turn off.   - Attempted Zosyn (10/2-10/5) with possible rash. Zosyn turned off with improvement.   - Hold further cephalosporins or PCNs at this time   - Monitor for further drug rxns.     ETHICS/ GOC    - Attempted palliative discussion however family not interested and wishes for FULL CODE    DISPO - TBD

## 2023-10-06 NOTE — PROGRESS NOTE ADULT - SUBJECTIVE AND OBJECTIVE BOX
CHIEF COMPLAINT: Patient is a 73y old  Female who presents with a chief complaint of Respiratory distress (06 Oct 2023 12:51)      INTERVAL EVENTS:  - No interval events overnight   - PIPs elevated and volume overloaded, however planned HD today and will monitor   - Hypoglycemia episodes yesterday, however volume overloaded and will hold further IVF. Hold NPH for now. Continue on ISS and reintroduce NPH vs increase ISS to moderate scale as needed.     REVIEW OF SYSTEMS: Seen by bedside during AM rounds and unable to assess ROS second to encephalopathy/ vented     Mode: AC/ CMV (Assist Control/ Continuous Mandatory Ventilation), RR (machine): 22, TV (machine): 340, FiO2: 35, PEEP: 8, ITime: 0.8, MAP: 18, PIP: 51      OBJECTIVE:  ICU Vital Signs Last 24 Hrs  T(C): 37.7 (06 Oct 2023 10:01), Max: 38 (05 Oct 2023 23:00)  T(F): 99.8 (06 Oct 2023 10:01), Max: 100.4 (05 Oct 2023 23:00)  HR: 110 (06 Oct 2023 11:12) (93 - 117)  BP: 120/43 (06 Oct 2023 10:01) (104/51 - 124/55)  BP(mean): 65 (06 Oct 2023 00:00) (65 - 65)  ABP: --  ABP(mean): --  RR: 19 (06 Oct 2023 10:01) (17 - 21)  SpO2: 97% (06 Oct 2023 11:12) (96% - 100%)    O2 Parameters below as of 06 Oct 2023 10:01  Patient On (Oxygen Delivery Method): ventilator    O2 Concentration (%): 40      Mode: AC/ CMV (Assist Control/ Continuous Mandatory Ventilation), RR (machine): 22, TV (machine): 340, FiO2: 35, PEEP: 8, ITime: 0.8, MAP: 18, PIP: 51    10-05 @ 07:01  -  10-06 @ 07:00  --------------------------------------------------------  IN: 575 mL / OUT: 0 mL / NET: 575 mL      CAPILLARY BLOOD GLUCOSE  POCT Blood Glucose.: 211 mg/dL (06 Oct 2023 12:31)      HOSPITAL MEDICATIONS:  MEDICATIONS  (STANDING):  albuterol/ipratropium for Nebulization 3 milliLiter(s) Nebulizer every 6 hours  apixaban 5 milliGRAM(s) Oral every 12 hours  artificial tears (preservative free) Ophthalmic Solution 1 Drop(s) Both EYES every 4 hours  aspirin  chewable 81 milliGRAM(s) Enteral Tube daily  atorvastatin 10 milliGRAM(s) Oral at bedtime  aztreonam  IVPB 2000 milliGRAM(s) IV Intermittent <User Schedule>  chlorhexidine 0.12% Liquid 15 milliLiter(s) Oral Mucosa every 12 hours  chlorhexidine 2% Cloths 1 Application(s) Topical daily  dextrose 5%. 1000 milliLiter(s) (50 mL/Hr) IV Continuous <Continuous>  dextrose 5%. 1000 milliLiter(s) (100 mL/Hr) IV Continuous <Continuous>  dextrose 50% Injectable 12.5 Gram(s) IV Push once  dextrose 50% Injectable 25 Gram(s) IV Push once  dextrose 50% Injectable 25 Gram(s) IV Push once  dextrose 50% Injectable 25 Gram(s) IV Push once  epoetin odalis (EPOGEN) Injectable 17878 Unit(s) IV Push <User Schedule>  ferrous    sulfate Liquid 300 milliGRAM(s) Oral daily  glucagon  Injectable 1 milliGRAM(s) IntraMuscular once  insulin lispro (ADMELOG) corrective regimen sliding scale   SubCutaneous every 6 hours  levETIRAcetam  Solution 1000 milliGRAM(s) Oral daily  levETIRAcetam  Solution 250 milliGRAM(s) Oral <User Schedule>  melatonin 3 milliGRAM(s) Oral at bedtime  midodrine. 30 milliGRAM(s) Oral every 8 hours  pantoprazole  Injectable 40 milliGRAM(s) IV Push two times a day  petrolatum Ophthalmic Ointment 1 Application(s) Both EYES two times a day  sodium chloride 1 Gram(s) Oral two times a day  sodium chloride 3%  Inhalation 4 milliLiter(s) Inhalation every 6 hours  valACYclovir 500 milliGRAM(s) Oral every 24 hours    MEDICATIONS  (PRN):  acetaminophen   Oral Liquid .. 650 milliGRAM(s) Oral every 6 hours PRN Temp greater or equal to 38C (100.4F), Mild Pain (1 - 3)  calamine/zinc oxide Lotion 1 Application(s) Topical two times a day PRN Rash and/or Itching  dextrose Oral Gel 15 Gram(s) Oral once PRN Blood Glucose LESS THAN 70 milliGRAM(s)/deciliter  sodium chloride 0.9% lock flush 10 milliLiter(s) IV Push every 1 hour PRN Pre/post blood products, medications, blood draw, and to maintain line patency      PHYSICAL EXAMINATION  General: NAD   HEENT: Trach present and left ear with less swelling and drainage around auricle. Left eye uveitis appears to be improving.   Cards: S1/S2, no murmurs   Pulm: Course vent sounds bilaterally with inspiratory and expiratory wheeze.  Abdomen: Soft, NTND. BS (+) NGT present.   Extremities: Generalized anasarca. 2+ BL LE pitting edema.   Neurology: Eyes closed, does not respond to commands, only grimaces to pain and suctioning with no acute focal neurological deficits     LABS:                        9.3    12.58 )-----------( 399      ( 05 Oct 2023 05:43 )             29.3     10-05    138  |  98  |  37<H>  ----------------------------<  148<H>  3.1<L>   |  28  |  1.33<H>    Ca    8.5      05 Oct 2023 05:43  Phos  2.2     10-05  Mg     1.90     10-05        Urinalysis Basic - ( 05 Oct 2023 05:43 )  Color: x / Appearance: x / SG: x / pH: x  Gluc: 148 mg/dL / Ketone: x  / Bili: x / Urobili: x   Blood: x / Protein: x / Nitrite: x   Leuk Esterase: x / RBC: x / WBC x   Sq Epi: x / Non Sq Epi: x / Bacteria: x            PAST MEDICAL & SURGICAL HISTORY:  Breast CA      Diabetes      Stroke      Cardiac arrest      HTN (hypertension)      H/O mastectomy, bilateral          FAMILY HISTORY:  No pertinent family history in first degree relatives        Social History:      RADIOLOGY:  [ ] Reviewed and interpreted by me    PULMONARY FUNCTION TESTS:    EKG:

## 2023-10-06 NOTE — PROGRESS NOTE ADULT - SUBJECTIVE AND OBJECTIVE BOX
S: No overnight events. Pt seen. EEG unchanged, Still with suppressible tremor      Medications: MEDICATIONS  (STANDING):  albuterol/ipratropium for Nebulization 3 milliLiter(s) Nebulizer every 6 hours  apixaban 5 milliGRAM(s) Oral every 12 hours  artificial tears (preservative free) Ophthalmic Solution 1 Drop(s) Both EYES every 4 hours  aspirin  chewable 81 milliGRAM(s) Enteral Tube daily  atorvastatin 10 milliGRAM(s) Oral at bedtime  aztreonam  IVPB 2000 milliGRAM(s) IV Intermittent <User Schedule>  chlorhexidine 0.12% Liquid 15 milliLiter(s) Oral Mucosa every 12 hours  chlorhexidine 2% Cloths 1 Application(s) Topical daily  dextrose 5%. 1000 milliLiter(s) (50 mL/Hr) IV Continuous <Continuous>  dextrose 5%. 1000 milliLiter(s) (100 mL/Hr) IV Continuous <Continuous>  dextrose 50% Injectable 12.5 Gram(s) IV Push once  dextrose 50% Injectable 25 Gram(s) IV Push once  dextrose 50% Injectable 25 Gram(s) IV Push once  dextrose 50% Injectable 25 Gram(s) IV Push once  epoetin odalis (EPOGEN) Injectable 11188 Unit(s) IV Push <User Schedule>  ferrous    sulfate Liquid 300 milliGRAM(s) Oral daily  glucagon  Injectable 1 milliGRAM(s) IntraMuscular once  insulin lispro (ADMELOG) corrective regimen sliding scale   SubCutaneous every 6 hours  levETIRAcetam  Solution 1000 milliGRAM(s) Oral daily  levETIRAcetam  Solution 250 milliGRAM(s) Oral <User Schedule>  melatonin 3 milliGRAM(s) Oral at bedtime  midodrine. 30 milliGRAM(s) Oral every 8 hours  pantoprazole  Injectable 40 milliGRAM(s) IV Push two times a day  petrolatum Ophthalmic Ointment 1 Application(s) Both EYES two times a day  sodium chloride 1 Gram(s) Oral two times a day  sodium chloride 3%  Inhalation 4 milliLiter(s) Inhalation every 6 hours  valACYclovir 500 milliGRAM(s) Oral every 24 hours    MEDICATIONS  (PRN):  acetaminophen   Oral Liquid .. 650 milliGRAM(s) Oral every 6 hours PRN Temp greater or equal to 38C (100.4F), Mild Pain (1 - 3)  calamine/zinc oxide Lotion 1 Application(s) Topical two times a day PRN Rash and/or Itching  dextrose Oral Gel 15 Gram(s) Oral once PRN Blood Glucose LESS THAN 70 milliGRAM(s)/deciliter  sodium chloride 0.9% lock flush 10 milliLiter(s) IV Push every 1 hour PRN Pre/post blood products, medications, blood draw, and to maintain line patency       Vitals:  Vital Signs Last 24 Hrs  T(C): 37.7 (06 Oct 2023 10:01), Max: 38 (05 Oct 2023 23:00)  T(F): 99.8 (06 Oct 2023 10:01), Max: 100.4 (05 Oct 2023 23:00)  HR: 110 (06 Oct 2023 11:12) (93 - 117)  BP: 120/43 (06 Oct 2023 10:01) (104/51 - 124/55)  BP(mean): 65 (06 Oct 2023 00:00) (65 - 65)  RR: 19 (06 Oct 2023 10:01) (17 - 21)  SpO2: 97% (06 Oct 2023 11:12) (96% - 100%)    Parameters below as of 06 Oct 2023 10:01  Patient On (Oxygen Delivery Method): ventilator    O2 Concentration (%): 40        Neurological Exam:  Mental Status: Eyes closed, off sedation. Does not follow commands,  + trach to vent and NGT   Cranial Nerves: PERRL slightly sluggish, L subconjunctival hemorrhage  Motor: Moves upper extremities spontaneously, tremor R > L  no movement noted in lowers  Sensation: WD to noxious x4    I personally reviewed the below data/images/labs:       LABS:                          9.3    12.58 )-----------( 399      ( 05 Oct 2023 05:43 )             29.3     10-05    138  |  98  |  37<H>  ----------------------------<  148<H>  3.1<L>   |  28  |  1.33<H>    Ca    8.5      05 Oct 2023 05:43  Phos  2.2     10-05  Mg     1.90     10-05          Urinalysis Basic - ( 05 Oct 2023 05:43 )    Color: x / Appearance: x / SG: x / pH: x  Gluc: 148 mg/dL / Ketone: x  / Bili: x / Urobili: x   Blood: x / Protein: x / Nitrite: x   Leuk Esterase: x / RBC: x / WBC x   Sq Epi: x / Non Sq Epi: x / Bacteria: x              < from: CT Head No Cont (08.15.23 @ 17:20) >    ACC: [id number] EXAM:  CT BRAIN   ORDERED BY: MINAL SAM     PROCEDURE DATE:  08/15/2023          INTERPRETATION:  Clinical indication: Change in neuro exam.    Multiple axial sections were performed from base to vertex without   contrast enhancement. Coronal and sagittal reconstructions were   reformatted well.    This exam is compared prior head CT performed on August 11, 2023    Parenchymal volume loss and chronic microvessel ischemic changes are   again seen.    Abnormal low-attenuation involving the left occipital cortical   subcortical region is again seen. 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    No evidence of acute hemorrhage mass or mass effect is seen.    Evaluation of the osseous structures with appropriate window demonstrates   sclerotic changes about the left mastoid region which appears stable.   Opacification left middle ear region is again seen.    Patient is status post bilateral cataract surgery.    Impression: Stable exam.    < end of copied text >    vEEG:    Clinical Impression:  - Severe diffuse non-specific cerebral dysfunction  - There were no epileptiform abnormalities or seizures recorded.        CTH 8/11:    VENTRICLES AND SULCI: Age-appropriate involutional change  INTRA-AXIAL:  Old left PCA infarct as seen on the prior unchanged.   Microvascular ischemic changes involving the periventricular and   subcortical white matter as seen previously  EXTRA-AXIAL:  No mass or collection is seen.  VISUALIZED SINUSES:  Clear.  VISUALIZED MASTOIDS: Left mastoid sclerosis  CALVARIUM: Infiltrative appearance tothe calvarium may be indicative of   marrow infiltration on the basis of patient's known diagnosis of breast   cancer. MISCELLANEOUS:  None.    IMPRESSION:  No significant interval change compared with 7/17/2023 in   left PCA infarct which is old. Microvascular ischemic changes involving   the periventricular and subcortical white matter as seen   previously.Questionable lesions at the level of the calvarium related to   possible breast CA. Clinical correlation recommended.    --- End of Report ---      ct< from: CT Head No Cont (09.26.23 @ 21:02) >  IMPRESSION: Vague questionable areas of hypoattenuation within the   bilateral cerebellar hemispheres which may be compatible with   acute/subacute ischemia. Recommend further evaluation with a brain MRI   study, provided there are no MRI contraindications.    No acute intracranial hemorrhage.    Previously seen questionable vague wedge-shaped area of hypoattenuation   in the left parietal lobe with artifactual secondary to motion and volume   averaging with a prominent sulcus.    Chronic left occipital lobe infarct.    < end of copied text >    < from: CT Head No Cont (10.03.23 @ 20:46) >    IMPRESSION:    No acute intracranial hemorrhage or mass effect. Evaluation of the   posterior fossa degraded by streak artifact from dental amalgam.   Lucencies in the bilateral cerebellum may be artifactual.    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    Bilateral middle ear and mastoid effusions which are also seen on prior   exam.    EEG Classification / Summary:  Abnormal EEG in the awake, drowsy states.   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   -No seizures

## 2023-10-06 NOTE — PROGRESS NOTE ADULT - NS PANP COMMENT GEN_ALL_CORE FT
-    Remains minimally responsive. Resolved RUE focal twitching. EEG with possible focal seizure with RUE twitching and at risk of focal onset seizures from multiple locations, especially left posterior temporal/posterocentral region, likely due to encephalomalacia/gliosis from prior left parietal CVA. Started on levetiracetam. Also with underlying encephalopathy due to acute illnes + RUSS + sepsis + history of CVA's (MICU course complicated by new R cerebellar, midbrain, and multiple tiny embolic infarcts as well as known left PCA CVA). Repeat CT head on 10/3 with no acute changes. Ammonia normal. BUN improved. Continue vEEG monitoring.    Continue dialysis with fluid removal per renal. Will ask nephrology for an additional session of UF tomorrow given volume overloaded state. Stable H/H. Anemia of CKD, continue Epoetin per nephrology.     No further fevers. Continue Aztreonam per ID for coverage of  Pseudomonas and Proteus. Repeat sputum culture with numerous carbapenem-resistant Pseudomonas aeruginosa, now intermediate to aztreonam. However, she is allergic to PCNs and cephalosporins. Appreciate ENT follow-up for left ear otitis externa, which has now resolved. Follow-up ophthalmology regarding keratitis + chemosis, on Lacrilube and artificial tears.     HD stable. Continue midodrine 30 mg q8h. Continue aspirin and statin given h/o CVA    Continue lung protective ventilation. Airway clearance therapy with Duonebs and hypertonic saline.    Continue tube feeds as tolerates. PPI for GI ppx.    On apixaban for DVT. Hold apixaban after evening dose on 10/7 for Permacath next week.    Overall prognosis guarded, remains full code. Palliative care follow-up. I am worried that patient is approaching end of life. I discussed this with her  and daughter at bedside, who remain optimistic for her recovery, but understand that her condition is not improving. -    Remains minimally responsive. Resolved RUE focal twitching. EEG with possible focal seizure with RUE twitching and at risk of focal onset seizures from multiple locations, especially left posterior temporal/posterocentral region, likely due to encephalomalacia/gliosis from prior left parietal CVA. Started on levetiracetam. Also with underlying encephalopathy due to acute illnes + RUSS + sepsis + history of CVA's (MICU course complicated by new R cerebellar, midbrain, and multiple tiny embolic infarcts as well as known left PCA CVA). Repeat CT head on 10/3 with no acute changes. Ammonia normal. BUN improved. Continue vEEG monitoring.    Continue dialysis with fluid removal per renal. Will ask nephrology for an additional session of UF tomorrow given volume overloaded state. Stable H/H. Anemia of CKD, continue Epoetin per nephrology.     Febrile to 100.4 overnight. Repeat sputum culture with numerous carbapenem-resistant Pseudomonas aeruginosa, now intermediate to aztreonam. However, she is allergic to PCNs and cephalosporins. Appreciate ID follow-up, start Polymyxin B. Appreciate ENT follow-up for left ear otitis externa, which has now resolved. Follow-up ophthalmology regarding keratitis + chemosis, on Lacrilube and artificial tears.     HD stable. Continue midodrine 30 mg q8h. Continue aspirin and statin given h/o CVA    Continue lung protective ventilation. Airway clearance therapy with Duonebs and hypertonic saline.    Continue tube feeds as tolerates. PPI for GI ppx.    On apixaban for DVT. Hold apixaban after evening dose on 10/7 for Permacath next week.    Overall prognosis guarded, remains full code. Palliative care follow-up. I am worried that patient is approaching end of life. I discussed this with her  and daughter at bedside, who remain optimistic for her recovery, but understand that her condition is not improving.

## 2023-10-06 NOTE — PROGRESS NOTE ADULT - ASSESSMENT
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 family declined renal biopsy started on prednisone  s/p trach 9/1 and BAL with MSSA   ET cx with ESBL and MSSA  started on ana cristina 9/1  blood cx 9/1: MSSA   repeat negative 9/4, 9/8  CXR with b/l opacities, R increased  L ear edema and otitis externa, the last cx showed candida and ENT stated it could be fungal (saw black spores?)    initial resp failure for ?fluid ovrer load but also had MSSA in the sputum, got vanco, aztreonam one day then cefepime and had rash, renal failure which was considered due to cefepime and then received 2 days of cefazolin and then off, now with trach and bronc on 9/1 with MSSA bacteremia and BAL also MSSA,   another ET cx with MSSA and ESBL E-coli  hypotension, MSSA bacteremia likely due to pneumonia, repeat blood cx negative 9/4, TTE limited unable to exclude endocarditis, s/p a 4 week course of vanco then dapto through 10/2  L otitis externa on ana cristina since 9/1 but worsening edema and black discoloration with bullae forming and persistent fever (ear cx was negative)  Ct showed acute on chronic otitis externa and otomastoiditis , superimposed cholesteatoma can not be excluded, no malignant otitis externa  pt again febrile, ENT stated there was black spores which could be a fungal infection and the last cx showed candida albicans s/p 7 days of fluconazole   new erythematous patchy rash, did not improved after discontinuing the ana cristina so vanco switched to dapto but still with rash and it also started after pt was started on fluconazole so not sure which caused it  also hypotensive now and ?uveitis vs endophthalmitis, head CT with acute/subacute ischemia and old occipital infarct, chest/abd CT with unchanged  RUL consolidation, decreased BEVERLY consolidation  pt uremic as well, s/p shiley and resumed on HD 9/27  s/p bronc with excessive dynamic airway collapse s/p trach change and some improvement  pt febrile and tachy on 9/29, sputum cx with carbapenem resistant pseudomonas (S to zosyn) and proteus   ana cristina was switched to zosyn 10/1, again with rash 10/2, fever and increasing WBC eos, so switched to aztreonam 10/3, but repeat sputum cx with pseudomonas also I to aztreonam and amikacin, and pt still with fever and thick secretions   * discontinue aztreonam and start polymixin  * asked the lab to send sensitivities for polymixin and recarbrio   * ENT stated the otitis external completely resolved  * monitor CBC/diff and renal function  * f/u with ophthalmology, on valtrex as per ophthalmology     The above assessment and plan was discussed with the primary team    Yumiko Conner MD  contact on teams  After 5pm and on weekends call [phone]

## 2023-10-06 NOTE — EEG REPORT - NS EEG TEXT BOX
MILANA BALL [id number]     Study Date: 10/5/23 08:00 - 10/6/23 08:00  Duration: 23 hr 58 min  --------------------------------------------------------------------------------------------------  History:  CC/ HPI Patient is a 73y old  Female who presents with a chief complaint of Respiratory distress (05 Oct 2023 22:22)    MEDICATIONS  (STANDING):  albuterol/ipratropium for Nebulization 3 milliLiter(s) Nebulizer every 6 hours  apixaban 5 milliGRAM(s) Oral two times a day  artificial tears (preservative free) Ophthalmic Solution 1 Drop(s) Both EYES every 4 hours  aspirin  chewable 81 milliGRAM(s) Enteral Tube daily  atorvastatin 10 milliGRAM(s) Oral at bedtime  aztreonam  IVPB 2000 milliGRAM(s) IV Intermittent <User Schedule>  chlorhexidine 0.12% Liquid 15 milliLiter(s) Oral Mucosa every 12 hours  chlorhexidine 2% Cloths 1 Application(s) Topical daily  dextrose 5% + lactated ringers. 1000 milliLiter(s) (75 mL/Hr) IV Continuous <Continuous>  dextrose 5%. 1000 milliLiter(s) (50 mL/Hr) IV Continuous <Continuous>  dextrose 5%. 1000 milliLiter(s) (100 mL/Hr) IV Continuous <Continuous>  dextrose 50% Injectable 12.5 Gram(s) IV Push once  dextrose 50% Injectable 25 Gram(s) IV Push once  dextrose 50% Injectable 25 Gram(s) IV Push once  dextrose 50% Injectable 25 Gram(s) IV Push once  epoetin odalis (EPOGEN) Injectable 21565 Unit(s) IV Push <User Schedule>  ferrous    sulfate Liquid 300 milliGRAM(s) Oral daily  glucagon  Injectable 1 milliGRAM(s) IntraMuscular once  insulin lispro (ADMELOG) corrective regimen sliding scale   SubCutaneous every 6 hours  insulin NPH human recombinant 5 Unit(s) SubCutaneous every 6 hours  levETIRAcetam  Solution 1000 milliGRAM(s) Oral daily  levETIRAcetam  Solution 250 milliGRAM(s) Oral <User Schedule>  melatonin 3 milliGRAM(s) Oral at bedtime  midodrine. 30 milliGRAM(s) Oral every 8 hours  pantoprazole  Injectable 40 milliGRAM(s) IV Push two times a day  petrolatum Ophthalmic Ointment 1 Application(s) Both EYES two times a day  sodium chloride 2 Gram(s) Oral two times a day  sodium chloride 3%  Inhalation 4 milliLiter(s) Inhalation every 6 hours  valACYclovir 500 milliGRAM(s) Oral every 24 hours    --------------------------------------------------------------------------------------------------  Study Interpretation:    [[[Abbreviation Key:  PDR=alpha rhythm/posterior dominant rhythm. A-P=anterior posterior.  Amplitude: ‘very low’:<20; ‘low’:20-49; ‘medium’:; ‘high’:>150uV.  Persistence for periodic/rhythmic patterns (% of epoch) ‘rare’:<1%; ‘occasional’:1-10%; ‘frequent’:10-50%; ‘abundant’:50-90%; ‘continuous’:>90%.  Persistence for sporadic discharges: ‘rare’:<1/hr; ‘occasional’:1/min-1/hr; ‘frequent’:>1/min; ‘abundant’:>1/10 sec.  RPP=rhythmic and periodic patterns; GRDA=generalized rhythmic delta activity; FIRDA=frontal intermittent GRDA; LRDA=lateralized rhythmic delta activity; TIRDA=temporal intermittent rhythmic delta activity;  LPD=PLED=lateralized periodic discharges; GPD=generalized periodic discharges; BIPDs =bilateral independent periodic discharges; Mf=multifocal; SIRPDs=stimulus induced rhythmic, periodic, or ictal appearing discharges; BIRDs=brief potentially ictal rhythmic discharges >4 Hz, lasting .5-10s; PFA (paroxysmal bursts >13 Hz or =8 Hz <10s).  Modifiers: +F=with fast component; +S=with spike component; +R=with rhythmic component.  S-B=burst suppression pattern.  Max=maximal. N1-drowsy; N2-stage II sleep; N3-slow wave sleep. SSS/BETS=small sharp spikes/benign epileptiform transients of sleep. HV=hyperventilation; PS=photic stimulation]]]    Daily EEG Visual Analysis    FINDINGS:      Background:  The background is continuous and symmetric. The predominant background in the most wakeful state consists of diffuse polymorphic theta, alpha, and delta frequencies. There is no posterior dominant rhythm. There is occasional generalized rhythmic delta activity at 1 Hz.    Background Slowing:  Generalized slowing: As above  Focal slowing: No clear focal slowing    State Changes:   Drowsiness is characterized by fragmentation, attenuation, and slowing of the background activity.  No clear stage 2 sleep architecture.    Interictal Findings:  Frequent left posterior quadrant (T7/P7, C3/P3, O1) spike/sharp waves, occasionally periodic at 0.5-1 Hz.  Frequent right central/posterocentral (C4, at times involving T8/P8 or P4, shifting) spike/sharp waves, rarely semi-periodic at 1 Hz.  Occasional left frontal (Fp1/F3) and right frontal (Fp2/F4) independent sharp waves.  The above are less prevalent in a less wakeful state.    Electrographic and Electroclinical seizures:  Events of irregular RUE twitching on 10/5/23 12:10, 19:17.   Twitching also reported on 10/5/23 14:54, but not visualized on video at that time.  During the event at 19:17, RUE twitching stops when a visitor touches patient's RUE.  EEG shows ongoing epileptiform discharges as above with no electrographic seizure. Twitching not clearly time-locked to epileptiform discharges.    Other Clinical Events:  None    Activation Procedures:   Hyperventilation is not performed.    Photic stimulation is not performed.    Artifacts:  Intermittent myogenic and movement artifacts are present.    EKG:  Single-lead EKG shows regular rhythm at 120 bpm. Somewhat limited by artifact.    EEG Classification / Summary:  Abnormal EEG in the awake, drowsy states.   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   -No seizures          -------------------------------------------------------------------------------------------------------  [facility] EEG Reading Room Ph#: [phone]  Epilepsy Answering Service after 5PM and before 8:30AM: Ph#: [phone]    Michelle Lopez MD  Attending Physician, [facility]

## 2023-10-06 NOTE — PROGRESS NOTE ADULT - SUBJECTIVE AND OBJECTIVE BOX
Patient is a 73y old  Female who presents with a chief complaint of Respiratory distress (06 Oct 2023 15:46)      SUBJECTIVE / OVERNIGHT EVENTS: trache to vent, thick secretions, fever overnight, rpt sputum Cx w P. aeruginosa w Interm sens to Aztreonam, now DCed, started on Polymyxin, additional sensitivities being tested as per ID    MEDICATIONS  (STANDING):  albuterol/ipratropium for Nebulization 3 milliLiter(s) Nebulizer every 6 hours  apixaban 5 milliGRAM(s) Oral every 12 hours  artificial tears (preservative free) Ophthalmic Solution 1 Drop(s) Both EYES every 4 hours  aspirin  chewable 81 milliGRAM(s) Enteral Tube daily  atorvastatin 10 milliGRAM(s) Oral at bedtime  chlorhexidine 0.12% Liquid 15 milliLiter(s) Oral Mucosa every 12 hours  chlorhexidine 2% Cloths 1 Application(s) Topical daily  dextrose 5%. 1000 milliLiter(s) (100 mL/Hr) IV Continuous <Continuous>  dextrose 5%. 1000 milliLiter(s) (50 mL/Hr) IV Continuous <Continuous>  dextrose 50% Injectable 12.5 Gram(s) IV Push once  dextrose 50% Injectable 25 Gram(s) IV Push once  dextrose 50% Injectable 25 Gram(s) IV Push once  dextrose 50% Injectable 25 Gram(s) IV Push once  epoetin odalis (EPOGEN) Injectable 05341 Unit(s) IV Push <User Schedule>  ferrous    sulfate Liquid 300 milliGRAM(s) Oral daily  glucagon  Injectable 1 milliGRAM(s) IntraMuscular once  insulin lispro (ADMELOG) corrective regimen sliding scale   SubCutaneous every 6 hours  levETIRAcetam  Solution 1000 milliGRAM(s) Oral daily  levETIRAcetam  Solution 250 milliGRAM(s) Oral <User Schedule>  melatonin 3 milliGRAM(s) Oral at bedtime  midodrine. 30 milliGRAM(s) Oral every 8 hours  pantoprazole  Injectable 40 milliGRAM(s) IV Push two times a day  petrolatum Ophthalmic Ointment 1 Application(s) Both EYES two times a day  polymyxin B IVPB 796303 Unit(s) IV Intermittent every 12 hours  sodium chloride 1 Gram(s) Oral two times a day  sodium chloride 3%  Inhalation 4 milliLiter(s) Inhalation every 6 hours  valACYclovir 500 milliGRAM(s) Oral every 24 hours    MEDICATIONS  (PRN):  acetaminophen   Oral Liquid .. 650 milliGRAM(s) Oral every 6 hours PRN Temp greater or equal to 38C (100.4F), Mild Pain (1 - 3)  calamine/zinc oxide Lotion 1 Application(s) Topical two times a day PRN Rash and/or Itching  dextrose Oral Gel 15 Gram(s) Oral once PRN Blood Glucose LESS THAN 70 milliGRAM(s)/deciliter  sodium chloride 0.9% lock flush 10 milliLiter(s) IV Push every 1 hour PRN Pre/post blood products, medications, blood draw, and to maintain line patency      Vital Signs Last 24 Hrs  T(F): 99.7 (10-06-23 @ 13:19), Max: 100.4 (10-05-23 @ 23:00)  HR: 117 (10-06-23 @ 15:49) (93 - 117)  BP: 112/33 (10-06-23 @ 15:49) (104/51 - 124/55)  RR: 22 (10-06-23 @ 15:49) (17 - 22)  SpO2: 98% (10-06-23 @ 15:49) (96% - 100%)  Telemetry:   CAPILLARY BLOOD GLUCOSE      POCT Blood Glucose.: 163 mg/dL (06 Oct 2023 17:18)  POCT Blood Glucose.: 211 mg/dL (06 Oct 2023 12:31)  POCT Blood Glucose.: 197 mg/dL (06 Oct 2023 11:26)  POCT Blood Glucose.: 192 mg/dL (06 Oct 2023 07:03)  POCT Blood Glucose.: 182 mg/dL (06 Oct 2023 05:43)  POCT Blood Glucose.: 130 mg/dL (05 Oct 2023 23:31)    I&O's Summary    05 Oct 2023 07:01  -  06 Oct 2023 07:00  --------------------------------------------------------  IN: 575 mL / OUT: 0 mL / NET: 575 mL        PHYSICAL EXAM:  GENERAL: NAD, well-developed  HEAD:  Atraumatic, Normocephalic  EYES: EOMI, PERRLA, conjunctiva and sclera clear  NECK: Supple, No JVD  CHEST/LUNG: Clear to auscultation bilaterally; No wheeze  HEART: Regular rate and rhythm; No murmurs, rubs, or gallops  ABDOMEN: Soft, Nontender, Nondistended; Bowel sounds present  EXTREMITIES:  2+ Peripheral Pulses, No clubbing, cyanosis, or edema  PSYCH: AAOx3  NEUROLOGY: non-focal  SKIN: No rashes or lesions    LABS:                        8.1    11.81 )-----------( 348      ( 06 Oct 2023 16:40 )             25.5     10-05    138  |  98  |  37<H>  ----------------------------<  148<H>  3.1<L>   |  28  |  1.33<H>    Ca    8.5      05 Oct 2023 05:43  Phos  2.2     10-05  Mg     1.90     10-05            Urinalysis Basic - ( 05 Oct 2023 05:43 )    Color: x / Appearance: x / SG: x / pH: x  Gluc: 148 mg/dL / Ketone: x  / Bili: x / Urobili: x   Blood: x / Protein: x / Nitrite: x   Leuk Esterase: x / RBC: x / WBC x   Sq Epi: x / Non Sq Epi: x / Bacteria: x        RADIOLOGY & ADDITIONAL TESTS:    Imaging Personally Reviewed:    Consultant(s) Notes Reviewed:      Care Discussed with Consultants/Other Providers:

## 2023-10-06 NOTE — PROGRESS NOTE ADULT - ASSESSMENT
74 y/o F well known to me from my [facility] outpt practice. she was admitted at [facility] 7/12-7/22 w aspiration PNA, was treated w CEFEPIME, developed an allergic rash,  dCHF, + MAC on AFB culture, had been progressively getting more and more lethargic and dyspneic at home since DC.   In  am of 8/11/23  ptn presented with respiratory distress w hypoxia and hypercarbia requiring intubation 2/2 volume overload +/- Asp PNA      Neuro   responds appropriately   Baseline MS AOx3, aphasic   - h/o CVA , on aspirin and statin . resumed w feeding tube, ASA resumed 8/26  - eeg  2/2 tremors, no sz focus  - less responsive in the past few days  - MRI 8/17:  new R cerebellar infarct, old left PCA/Occipital infarct. 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 Mcnulty, now in MICU  s/p septic shock overnight 9/1, now on meropenem, HDS  s/p trache, on vent support, copious secretions      Renal     Ptn well know to Dr. Colon,following   HD 8/19,  8/21, 8/26 and 8/28.  s/p PUF 8/29 2.5 Liters, HD 8/30,  9/1, 9/4  L IJ HD placed  uremic  hyponatremic  HD as per renal        Hypotension  - Levo drip  prognosis is poor  consider palliative care consult    GI  NPO  on tube feeds  coffee ground emesis x 1 8/24, melena overnight 8/31, s/p 1UPRBC, EGD/Colonoscopy: erosive gastropathy, esophagisits, on colonoscopy old blood seen no active bleeding, poor prep  family to decide re PEG placement    Endocrine  T2DM   A1C 7.1 in 7/2023   - BG goal 140-200     ID   No fever/leukocytosis, recheck temp   Hx latent TB which was treated, no concern for TB   - grew MAC on AFB culture from most recent admission. at [facility]  -MSSA Bacteremia 9/1, allergic to cephalosprins and PCN, on Vanco as per ID, renal dosing,   - sputum also grew E.Coli-ESBL, as per ID recs for  Bradford through 9/7( 1 week course as per ID) , afebrile.  - 9/8:  spike  temp 38.1C, has O. externa, has a wick, recs are to get a CT to R/O an abscess/bony involvement. cont Meropenem  9/9: ptn w fever overnight to 100.8,  may need shiley pulled if bacteremic, needs NECK CT as per ENT to R/O Mastoid bone involvement while having ongoing purulent DC from L ear 2/2 O. externa, getting daily wick changes  9/10:  spiked 102 overnight through Bradford and Vanco, purulent DC from O. externa, ENT recommend Ct of mastoid. ptn in HD, has Shiley in place, consider pulling shiley and send for Cx. would d/w Renal, and consider contacting  ID, last seen by Dr. Conner 9/6 9/11: remains febrile, Dr. Conner reconsulted. cont Bradford, Vanco  9/12: tmax 100.5, fever curve is improving, awaiting Left ear CT, on Bradford since 9/1. on  vanco for MSSA Bacteremia, does not tolerate cephalosporins. through 10/2  9/13: on full vent support via trache, appears uncomfortable and onur 2/2 Left O. externa. has an incidental left middle ear tumor, no acute middle ear interventions recommended, left ear wick replaced. on Meropenem, cx from Ear DC is neg. On Vanco for MSSA bacteremia through 10/2, course of Meropenem as per ID, afebrile in the past 24 hrs . Cardura for HTN resumed, HGB dropped to 6.4, got a dose of EPO and 1UPRBC, rpt 7.8, remains on HEPARIN drip for LEFT popliteal DVT  9/14: cont on Mupirocin locally to Left ear, cont IV Bradford, ENT signed off, afebrile x 48 hrs, Mro course to be determined by ID, on Vanco for MSSA bacteremia through 10/2. ongoing worsening hyponatremia, Hypertonic saline as per renal, no HD today, s/p 1UPRBC 9/13  9/15: spiking temps again, if cont to spike as per ID re PAN scan. cont Vanco for MSSA Bacteremia  9/16-18: no temp overnight  afebrile, cont to have Left ear DC,   on Vanco and ,bradford through 10/2  On IV Fluconazole for fungal cx+ from O. externa Discharge.   fluconazole started 9/21, ptn developed an allergic rash on 9/22. doubt its 2/2 to MEROPENEM or Vanco.  MEropenem was DCed 2/2 causing possible drug rash.  on VANCO based on levels for MSSA bacteremia but DCed 2/2 poss drug rash, now on Daptomycin  9/29: ptn is septic, meropenem resumed, s/p HD 9/28, next HD tomorrow    Heme/Onc  ppx: place on SCD  Transfuse for hgb 6.4, no obv bleed. HDS  LEFT POPLITEAL VEIN ACUTE DVT on 9/10, on Heparin drip    Ethics  GOC - Discussed GOC with daughter and , they have opted in the past for full code. and she remains full code at present      9/27:  In [facility], R IJ HD catheter placed, NGT in place, acidotic on VBG, trache to vent, anasarca, on IV BUMEX, on Levo, on Heparin drip, on stress dose HYdrocortisone, FS in range, uremic, awaiting HD, CT C/A/P w no acute findings. VANCO Dced , now on Dapto, for MSSA baceteremia through 10/2    9/28: HD started 9/27, still encephalopathic, fluconazole DCed as it could be the cause of the rash. on Dapto to complete a course of Abx for MSSA bacteremia through 10/2, VANCO DCed 2/2 possible rash. off pressors    9/30: sepsis resolved, off fluconazole, would add FLUCONAZOLE to the allergy list. on Meropenem, on Dapto through 10/2 instead of Vanco.Vanco was DCed as preseumed ptn had an allergic rash to Vanco. she is not allergic to Vanco. vent dependent. tolerating HD. s/p HD today, next on 10/3    10/1: back in RCU, trach to vent,  tolerating HD well, on Bradford, Dapto, off fluconazole 2/2 allergic rxn    10/2:  trache to vent, completed a course of ABx for MSSA bacteremia TODAY, sputum grew P. aeruginosa resistant to MEROPENEM, ptn was switched to ZOSYN. 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 copious secretions. temp last night. ptn got 2 gm dose of AZACTAM today at 6 pm, ptn seen at 8 pm, has facial swelling and erythema, no stridor, ID made aware. Daughter and  at bedside state the allergic rash post ZOsyn had improved after DC zosyn. last dose of ZOsyn today at 2 pm, prior to that 11 am.   may need to switch to Amikacin if reaction will cont on Azactam. cont Benadryl. rpt Head ct tonight to r/o new CVA. HD as per renal    10/4: HD today, as per renal. no fever overnight, remains on AZTREONAM, rash resolved, next dose tonight    10/5: On aztreonam, afebrile, facial erythema is mild, prob resolving from ZOSYN exposure. on HD as per renal. trache to vent. O. externa resolved. 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 aeruginosa w Interm sens to Aztreonam, now DCed, started on Polymyxin, additional sensitivities being tested as per ID

## 2023-10-06 NOTE — PROGRESS NOTE ADULT - ASSESSMENT
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 While in MICU patient noted with worsening renal failure requiring HD initiation, CGE/ Melena s/p EGD 8/31 found with esophagitis and erosive gastropathy, new right cerebellar infarct and fevers second to ESBL COLI and MSSA VAP, MSSA bacteremia, left ear otitis externa and drug rxn to cephalosporins Course further complicated by prolonged vent time s/p tracheostomy 9/1 and transferred to RCU 9/3 completed by recurrent fevers with high PIP, respiratory acidosis and concern for volume overloaded.   CTH repeated 9/26 for concern for encephalopathy - has known now - chronic bilateral cerebellar infarcts, L PCA infarct. L parietal hypodensity seen on prior CT likely artifactual  Repeat CTH 10/3 - unchanged  EEG 10/5 with L posterocentral/temporal spikes/sharp waves - doubt true focal seizure upon further review of EEG     Impression:   ? Focal seizure - has hx of bilateral R > L tremors   Metabolic encephalopathy related to shock, infection, doubt new stroke  L PCA stroke, ESUS s/p ILR, also with bilateral centrum semiovale watershed infarcts   Multiple embolic strokes on MRI 8/17 - R cerebellum, midbrain, multiple other tiny embolic infarcts.   Dementia   MSSA bacteremia, r/o endocarditis s/p Daptomycin  Acute popliteal DVT on Eliquis   Anemia     Recommendations   [] has multiple areas of epileptogenic potential on EEG, no clear seizure correlate seen. Can continue Keppra for now to see if helps with mental status.   [] Keppra 500mg BID with extra 250mg after HD on HD days.  [] dc EEG  [] On Aztreonam  [] New CTH from 9/26 and 10/3 without any evidence of acute infarct -> encephalopathy likely related to underlying metabolic derangements.  [] GI following for coffee ground emesis - esophagitis seen on colonoscopy, on ELiquis  [] family declined kidney biopsy for AIN -> s/p steroid tx   [] C/w home ASA 81 mg if no contraindications   [] hep gtt transitioned to ELiquis -> ?consider holding and IVC filter given anemia   [] C/w home Atorvastatin 10 mg qhs   [] would interrogate ILR and review tele to see if pAF -. no AF seen  [] DVT/GI ppx - hep gtt   [] Neurochecks q4hr   [] BP goals: Normotension - on pressors  [] PT/OT when able   [] HgA1C goals < 7.0   [] continue to address GOC with family as pts  and daughter continue to remain hopeful    d/w  and PA    Katty Charles, DO  Vascular Neurology  Office [phone]

## 2023-10-06 NOTE — CHART NOTE - NSCHARTNOTEFT_GEN_A_CORE
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 While in MICU patient noted with worsening renal failure requiring HD initiation, CGE/ Melena s/p EGD 8/31 found with esophagitis and erosive gastropathy, new right cerebellar infarct and fevers second to ESBL COLI and MSSA VAP, MSSA bacteremia, left ear otitis externa and drug rxn to cephalosporins. 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 Case discussed at length renal and given good UOP attempted renal recovery, however failed, and upon reinitiation of HD attempt. R IJ SHILEY failed. Attempted volume removal with Bumex GTT however course complicated by hypotension requiring transfer back to MICU 9/26 for pressor support. Diuresis dc'ed, pressors weaned off and stress steroids started. L IJ SHILEY placed (9/30) and HD reinstated. Course further complicated by AOCD and  PSA and Proteus VAP. Patient transferred back to RCU 10/1 with course complicated by volume overload and grossly resistant organisms. Now anuric and likely need long term HD. Plan for SHILEY to PERMACATH conversion.     Procedure:  SHILEY to PERMACATH conversion    Interventional Radiology Attending Physician: Dr Love    Ordering Attending Physician: BETTINA Candelario     Pertinent labs:                      9.3    12.58 )-----------( 399      ( 05 Oct 2023 05:43 )             29.3       10-05    138  |  98  |  37<H>  ----------------------------<  148<H>  3.1<L>   |  28  |  1.33<H>    Ca    8.5      05 Oct 2023 05:43  Phos  2.2     10-05  Mg     1.90     10-05            Patient and Family Aware ? Yes    Alexey Candelario PA-C  Department of Medicine/ RCU  In house RCU [facility] [phone]  In house Medicine Beeper [id number]  Reachable via teams

## 2023-10-06 NOTE — PROGRESS NOTE ADULT - SUBJECTIVE AND OBJECTIVE BOX
Subjective: Patient seen and examined. No new events except as noted.     REVIEW OF SYSTEMS:  Unable to obtain     MEDICATIONS:  MEDICATIONS  (STANDING):  albuterol/ipratropium for Nebulization 3 milliLiter(s) Nebulizer every 6 hours  apixaban 5 milliGRAM(s) Oral every 12 hours  artificial tears (preservative free) Ophthalmic Solution 1 Drop(s) Both EYES every 4 hours  aspirin  chewable 81 milliGRAM(s) Enteral Tube daily  atorvastatin 10 milliGRAM(s) Oral at bedtime  aztreonam  IVPB 2000 milliGRAM(s) IV Intermittent <User Schedule>  chlorhexidine 0.12% Liquid 15 milliLiter(s) Oral Mucosa every 12 hours  chlorhexidine 2% Cloths 1 Application(s) Topical daily  dextrose 5%. 1000 milliLiter(s) (50 mL/Hr) IV Continuous <Continuous>  dextrose 5%. 1000 milliLiter(s) (100 mL/Hr) IV Continuous <Continuous>  dextrose 50% Injectable 12.5 Gram(s) IV Push once  dextrose 50% Injectable 25 Gram(s) IV Push once  dextrose 50% Injectable 25 Gram(s) IV Push once  dextrose 50% Injectable 25 Gram(s) IV Push once  epoetin odalis (EPOGEN) Injectable 40912 Unit(s) IV Push <User Schedule>  ferrous    sulfate Liquid 300 milliGRAM(s) Oral daily  glucagon  Injectable 1 milliGRAM(s) IntraMuscular once  insulin lispro (ADMELOG) corrective regimen sliding scale   SubCutaneous every 6 hours  levETIRAcetam  Solution 1000 milliGRAM(s) Oral daily  levETIRAcetam  Solution 250 milliGRAM(s) Oral <User Schedule>  melatonin 3 milliGRAM(s) Oral at bedtime  midodrine. 30 milliGRAM(s) Oral every 8 hours  pantoprazole  Injectable 40 milliGRAM(s) IV Push two times a day  petrolatum Ophthalmic Ointment 1 Application(s) Both EYES two times a day  sodium chloride 1 Gram(s) Oral two times a day  sodium chloride 3%  Inhalation 4 milliLiter(s) Inhalation every 6 hours  valACYclovir 500 milliGRAM(s) Oral every 24 hours      PHYSICAL EXAM:  T(C): 37.7 (10-06-23 @ 10:01), Max: 38 (10-05-23 @ 23:00)  HR: 110 (10-06-23 @ 11:12) (93 - 124)  BP: 120/43 (10-06-23 @ 10:01) (104/51 - 124/55)  RR: 19 (10-06-23 @ 10:01) (17 - 21)  SpO2: 97% (10-06-23 @ 11:12) (96% - 100%)  Wt(kg): --  I&O's Summary    05 Oct 2023 07:01  -  06 Oct 2023 07:00  --------------------------------------------------------  IN: 575 mL / OUT: 0 mL / NET: 575 mL          Appearance: NAD, +trach  HEENT: dry oral mucosa  Lymphatic: No lymphadenopathy  Cardiovascular: Normal S1 S2, No JVD, No murmurs, No edema  Respiratory: Decreased BS, + trach to vent	  Neuro: opens eyes  Gastrointestinal: Soft, Non-tender, + BS, + NGT  Skin: No rashes, No ecchymoses, No cyanosis	  Extremities: No strength/ROM 2/2 sedation, + BL LE edema  Vascular: Peripheral pulses palpable 2+ bilaterally      LABS:    CARDIAC MARKERS:                                9.3    12.58 )-----------( 399      ( 05 Oct 2023 05:43 )             29.3     10-05    138  |  98  |  37<H>  ----------------------------<  148<H>  3.1<L>   |  28  |  1.33<H>    Ca    8.5      05 Oct 2023 05:43  Phos  2.2     10-05  Mg     1.90     10-05    TELEMETRY: 	    ECG:  	  RADIOLOGY:   DIAGNOSTIC TESTING:  [ ] Echocardiogram:  [ ]  Catheterization:  [ ] Stress Test:    OTHER: 	  EEG REPORT:   EEG Report:  · EEG Report	  MILANA BALL MRN-[id number]     Study Date: 10/5/23 08:00 - 10/6/23 08:00  Duration: 23 hr 58 min  --------------------------------------------------------------------------------------------------  History:  CC/ HPI Patient is a 73y old  Female who presents with a chief complaint of Respiratory distress (05 Oct 2023 22:22)    MEDICATIONS  (STANDING):  albuterol/ipratropium for Nebulization 3 milliLiter(s) Nebulizer every 6 hours  apixaban 5 milliGRAM(s) Oral two times a day  artificial tears (preservative free) Ophthalmic Solution 1 Drop(s) Both EYES every 4 hours  aspirin  chewable 81 milliGRAM(s) Enteral Tube daily  atorvastatin 10 milliGRAM(s) Oral at bedtime  aztreonam  IVPB 2000 milliGRAM(s) IV Intermittent <User Schedule>  chlorhexidine 0.12% Liquid 15 milliLiter(s) Oral Mucosa every 12 hours  chlorhexidine 2% Cloths 1 Application(s) Topical daily  dextrose 5% + lactated ringers. 1000 milliLiter(s) (75 mL/Hr) IV Continuous <Continuous>  dextrose 5%. 1000 milliLiter(s) (50 mL/Hr) IV Continuous <Continuous>  dextrose 5%. 1000 milliLiter(s) (100 mL/Hr) IV Continuous <Continuous>  dextrose 50% Injectable 12.5 Gram(s) IV Push once  dextrose 50% Injectable 25 Gram(s) IV Push once  dextrose 50% Injectable 25 Gram(s) IV Push once  dextrose 50% Injectable 25 Gram(s) IV Push once  epoetin odalis (EPOGEN) Injectable 58639 Unit(s) IV Push <User Schedule>  ferrous    sulfate Liquid 300 milliGRAM(s) Oral daily  glucagon  Injectable 1 milliGRAM(s) IntraMuscular once  insulin lispro (ADMELOG) corrective regimen sliding scale   SubCutaneous every 6 hours  insulin NPH human recombinant 5 Unit(s) SubCutaneous every 6 hours  levETIRAcetam  Solution 1000 milliGRAM(s) Oral daily  levETIRAcetam  Solution 250 milliGRAM(s) Oral <User Schedule>  melatonin 3 milliGRAM(s) Oral at bedtime  midodrine. 30 milliGRAM(s) Oral every 8 hours  pantoprazole  Injectable 40 milliGRAM(s) IV Push two times a day  petrolatum Ophthalmic Ointment 1 Application(s) Both EYES two times a day  sodium chloride 2 Gram(s) Oral two times a day  sodium chloride 3%  Inhalation 4 milliLiter(s) Inhalation every 6 hours  valACYclovir 500 milliGRAM(s) Oral every 24 hours    --------------------------------------------------------------------------------------------------  Study Interpretation:    [[[Abbreviation Key:  PDR=alpha rhythm/posterior dominant rhythm. A-P=anterior posterior.  Amplitude: ‘very low’:<20; ‘low’:20-49; ‘medium’:; ‘high’:>150uV.  Persistence for periodic/rhythmic patterns (% of epoch) ‘rare’:<1%; ‘occasional’:1-10%; ‘frequent’:10-50%; ‘abundant’:50-90%; ‘continuous’:>90%.  Persistence for sporadic discharges: ‘rare’:<1/hr; ‘occasional’:1/min-1/hr; ‘frequent’:>1/min; ‘abundant’:>1/10 sec.  RPP=rhythmic and periodic patterns; GRDA=generalized rhythmic delta activity; FIRDA=frontal intermittent GRDA; LRDA=lateralized rhythmic delta activity; TIRDA=temporal intermittent rhythmic delta activity;  LPD=PLED=lateralized periodic discharges; GPD=generalized periodic discharges; BIPDs =bilateral independent periodic discharges; Mf=multifocal; SIRPDs=stimulus induced rhythmic, periodic, or ictal appearing discharges; BIRDs=brief potentially ictal rhythmic discharges >4 Hz, lasting .5-10s; PFA (paroxysmal bursts >13 Hz or =8 Hz <10s).  Modifiers: +F=with fast component; +S=with spike component; +R=with rhythmic component.  S-B=burst suppression pattern.  Max=maximal. N1-drowsy; N2-stage II sleep; N3-slow wave sleep. SSS/BETS=small sharp spikes/benign epileptiform transients of sleep. HV=hyperventilation; PS=photic stimulation]]]    Daily EEG Visual Analysis    FINDINGS:      Background:  The background is continuous and symmetric. The predominant background in the most wakeful state consists of diffuse polymorphic theta, alpha, and delta frequencies. There is no posterior dominant rhythm. There is occasional generalized rhythmic delta activity at 1 Hz.    Background Slowing:  Generalized slowing: As above  Focal slowing: No clear focal slowing    State Changes:   Drowsiness is characterized by fragmentation, attenuation, and slowing of the background activity.  No clear stage 2 sleep architecture.    Interictal Findings:  Frequent left posterior quadrant (T7/P7, C3/P3, O1) spike/sharp waves, occasionally periodic at 0.5-1 Hz.  Frequent right central/posterocentral (C4, at times involving T8/P8 or P4, shifting) spike/sharp waves, rarely semi-periodic at 1 Hz.  Occasional left frontal (Fp1/F3) and right frontal (Fp2/F4) independent sharp waves.  The above are less prevalent in a less wakeful state.    Electrographic and Electroclinical seizures:  Events of irregular RUE twitching on 10/5/23 12:10, 19:17.   Twitching also reported on 10/5/23 14:54, but not visualized on video at that time.  During the event at 19:17, RUE twitching stops when a visitor touches patient's RUE.  EEG shows ongoing epileptiform discharges as above with no electrographic seizure. Twitching not clearly time-locked to epileptiform discharges.    Other Clinical Events:  None    Activation Procedures:   Hyperventilation is not performed.    Photic stimulation is not performed.    Artifacts:  Intermittent myogenic and movement artifacts are present.    EKG:  Single-lead EKG shows regular rhythm at 120 bpm. Somewhat limited by artifact.    EEG Classification / Summary:  Abnormal EEG in the awake, drowsy states.   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   -No seizures          -------------------------------------------------------------------------------------------------------  [facility] EEG Reading Room Ph#: [phone]  Epilepsy Answering Service after 5PM and before 8:30AM: Ph#: [phone]    Michelle Lopez MD  Attending Physician, [facility]          Electronic Signatures:  Michelle Lopez)  (Signed 06-Oct-2023 09:37)  	Authored: EEG REPORT      Last Updated: 06-Oct-2023 09:37 by Michelle Lopez)

## 2023-10-06 NOTE — PROGRESS NOTE ADULT - SUBJECTIVE AND OBJECTIVE BOX
Follow Up:  MSSA bacteremia    Interval History: pt tolerated aztreonam but had a fever again last night, also with thick secretions and repeat sputum cx with pseudomonas that is I to aztreonam and amikacin    ROS:    Unobtainable because: trached      Allergies  isoniazid (Rash)  nafcillin (Unknown)  hydrALAZINE (Rash)  vitamin E (Short breath; Urticaria; Hives)  doxycycline (Rash)  cefepime (Rash)  NIFEdipine (Urticaria; Hives)        ANTIMICROBIALS:  polymyxin B IVPB 200725 every 12 hours  valACYclovir 500 every 24 hours      OTHER MEDS:  acetaminophen   Oral Liquid .. 650 milliGRAM(s) Oral every 6 hours PRN  albuterol/ipratropium for Nebulization 3 milliLiter(s) Nebulizer every 6 hours  apixaban 5 milliGRAM(s) Oral every 12 hours  artificial tears (preservative free) Ophthalmic Solution 1 Drop(s) Both EYES every 4 hours  aspirin  chewable 81 milliGRAM(s) Enteral Tube daily  atorvastatin 10 milliGRAM(s) Oral at bedtime  calamine/zinc oxide Lotion 1 Application(s) Topical two times a day PRN  chlorhexidine 0.12% Liquid 15 milliLiter(s) Oral Mucosa every 12 hours  chlorhexidine 2% Cloths 1 Application(s) Topical daily  dextrose 5%. 1000 milliLiter(s) IV Continuous <Continuous>  dextrose 5%. 1000 milliLiter(s) IV Continuous <Continuous>  dextrose 50% Injectable 25 Gram(s) IV Push once  dextrose 50% Injectable 25 Gram(s) IV Push once  dextrose 50% Injectable 25 Gram(s) IV Push once  dextrose 50% Injectable 12.5 Gram(s) IV Push once  dextrose Oral Gel 15 Gram(s) Oral once PRN  epoetin odalis (EPOGEN) Injectable 94042 Unit(s) IV Push <User Schedule>  ferrous    sulfate Liquid 300 milliGRAM(s) Oral daily  glucagon  Injectable 1 milliGRAM(s) IntraMuscular once  insulin lispro (ADMELOG) corrective regimen sliding scale   SubCutaneous every 6 hours  levETIRAcetam  Solution 1000 milliGRAM(s) Oral daily  levETIRAcetam  Solution 250 milliGRAM(s) Oral <User Schedule>  melatonin 3 milliGRAM(s) Oral at bedtime  midodrine. 30 milliGRAM(s) Oral every 8 hours  pantoprazole  Injectable 40 milliGRAM(s) IV Push two times a day  petrolatum Ophthalmic Ointment 1 Application(s) Both EYES two times a day  sodium chloride 1 Gram(s) Oral two times a day  sodium chloride 0.9% lock flush 10 milliLiter(s) IV Push every 1 hour PRN  sodium chloride 3%  Inhalation 4 milliLiter(s) Inhalation every 6 hours      Vital Signs Last 24 Hrs  T(C): 37.6 (06 Oct 2023 13:19), Max: 38 (05 Oct 2023 23:00)  T(F): 99.7 (06 Oct 2023 13:19), Max: 100.4 (05 Oct 2023 23:00)  HR: 107 (06 Oct 2023 15:26) (93 - 117)  BP: 117/44 (06 Oct 2023 13:19) (104/51 - 124/55)  BP(mean): 65 (06 Oct 2023 13:19) (65 - 65)  RR: 22 (06 Oct 2023 13:19) (17 - 22)  SpO2: 98% (06 Oct 2023 15:26) (96% - 100%)    Parameters below as of 06 Oct 2023 13:19  Patient On (Oxygen Delivery Method): ventilator, ac    O2 Concentration (%): 35    Physical Exam:  General:    trached   Respiratory:   trach on vent  abd:  distended but soft  :     willard  Musculoskeletal : generalized edema  Skin: + erythema  vascular: TRISHA odom                           9.3    12.58 )-----------( 399      ( 05 Oct 2023 05:43 )             29.3       10-05    138  |  98  |  37<H>  ----------------------------<  148<H>  3.1<L>   |  28  |  1.33<H>    Ca    8.5      05 Oct 2023 05:43  Phos  2.2     10-05  Mg     1.90     10-05        Urinalysis Basic - ( 05 Oct 2023 05:43 )    Color: x / Appearance: x / SG: x / pH: x  Gluc: 148 mg/dL / Ketone: x  / Bili: x / Urobili: x   Blood: x / Protein: x / Nitrite: x   Leuk Esterase: x / RBC: x / WBC x   Sq Epi: x / Non Sq Epi: x / Bacteria: x        MICROBIOLOGY:  v  Trach Asp Tracheal Aspirate  10-03-23   Numerous Pseudomonas aeruginosa (Carbapenem Resistant)  Normal Respiratory Cate absent  --  Pseudomonas aeruginosa (Carbapenem Resistant)      .Blood Blood-Venous  09-29-23   No growth at 5 days  --  --      .Blood Blood-Peripheral  09-29-23   No growth at 5 days  --  --      ET Tube ET Tube  09-29-23   Rare Yeast  --  --      ET Tube ET Tube  09-29-23   Numerous Proteus mirabilis  Moderate Pseudomonas aeruginosa (Carbapenem Resistant)  Normal Respiratory Cate absent  --  Proteus mirabilis  Pseudomonas aeruginosa (Carbapenem Resistant)      Clean Catch Clean Catch (Midstream)  09-26-23   10,000 - 49,000 CFU/mL Candida albicans "Susceptibilities not performed"  --  --      .Blood Blood-Peripheral  09-26-23   No growth at 5 days  --  --      .Blood Blood-Venous  09-20-23   No growth at 5 days  --  --      .Blood Blood-Peripheral  09-20-23   No growth at 5 days  --  --      Ear Ear  09-13-23   Moderate Candida albicans "Susceptibilities not performed"  --  --      .Blood Blood-Venous  09-10-23   No growth at 5 days  --  --      .Sputum Sputum  09-10-23   Normal Respiratory Cate present  --    Rare polymorphonuclear leukocytes per low power field  No Squamous epithelial cells per low power field  Rare Yeast like cells seen per oil power field  Rare Gram Positive Cocci in Clusters seen per oil power field      .Blood Blood-Peripheral  09-10-23   No growth at 5 days  --  --      .Sputum Sputum  09-08-23   Normal Respiratory Cate present  --    Numerous polymorphonuclear leukocytes per low power field  Numerous Squamous epithelial cells per low power field  Few Yeast like cells per oil power field  Results consistent with oropharyngeal contamination      .Blood Blood-Peripheral  09-08-23   No growth at 5 days  --  --                RADIOLOGY:  Images independently visualized and reviewed personally, findings as below

## 2023-10-06 NOTE — PROGRESS NOTE ADULT - ASSESSMENT
IMPRESSION: 73F w/ HTN, DM2, CVA, breast CA-bilateral mastectomy, recurrent aspiration pneumonia/respiratory failure, and CKD, 8/11/23 p/w acute hypercapnic respiratory failure; c/b RUSS    (1)Renal - RUSS on CKD4 ==>now newly ESRD. Warranting conversion of shiley to tunneled cath at this point    (2)Lytes - hypokalemia/hypophosphatemia; best that we change tube feeds from Nepro to Glucerna    (3)Pulm- vent-dependent    RECOMMEND:  (1)Change tube feeds from Nepro to Glucerna  (2)Tunneled cath placement 10/10/23 per IR  (3)HD today - 3h, 2kg UF as able, pressors as needed to keep SBP>90; Epogen with HD  (4)Dose new meds for GFR <10/HD    Nilda Espinoza DNP  [facility]  [phone]    IMPRESSION: 73F w/ HTN, DM2, CVA, breast CA-bilateral mastectomy, recurrent aspiration pneumonia/respiratory failure, and CKD, 8/11/23 p/w acute hypercapnic respiratory failure; c/b RUSS    (1)Renal - RUSS on CKD4 ==>now newly ESRD. Warranting conversion of shiley to tunneled cath at this point    (2)Lytes - hypokalemia/hypophosphatemia; best that we change tube feeds from Nepro to Glucerna    (3)Pulm- vent-dependent    RECOMMEND:  (1)Change tube feeds from Nepro to Glucerna  (2)Tunneled cath placement 10/10/23 per IR  (3)HD today - 3hrs, 2kg UF as able, pressors as needed to keep SBP>90; Epogen with HD  (4)PUF tomorrow - 2.5 hrs. 2 kg UF as able, pressors as needed to keep SBP> 90  (5)Dose new meds for GFR <10/HD    Nilda Espinoza, HEAVEN  [facility]  [phone]    IMPRESSION: 73F w/ HTN, DM2, CVA, breast CA-bilateral mastectomy, recurrent aspiration pneumonia/respiratory failure, and CKD, 8/11/23 p/w acute hypercapnic respiratory failure; c/b RUSS    (1)Renal - RUSS on CKD4 ==>now newly ESRD. Warranting conversion of shiley to tunneled cath at this point    (2)Lytes - hypokalemia/hypophosphatemia; best that we change tube feeds from Nepro to Glucerna    (3)Pulm- vent-dependent    RECOMMEND:  (1)Change tube feeds from Nepro to Glucerna  (2)Tunneled cath placement 10/10/23 per IR  (3)HD today - 3hrs, 2kg UF as able, pressors as needed to keep SBP>90; Epogen with HD  (4)PUF tomorrow - 2.5 hrs. 2 kg UF as able, pressors as needed to keep SBP> 90  (5)Dose new meds for GFR <10/HD    Nilda Espinoza DNP  [facility]  [phone]     RENAL ATTENDING NOTE  Patient seen on HD - not tolerating much UF despite high-dose Midodrine - we will continue UF as able  Will attempt UF tomorrow - unclear if we will have much success however    Jimmy Colon MD  [facility]  [phone]

## 2023-10-07 NOTE — PROGRESS NOTE ADULT - ASSESSMENT
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 family declined renal biopsy started on prednisone  s/p trach 9/1 and BAL with MSSA   ET cx with ESBL and MSSA  started on ana cristina 9/1  blood cx 9/1: MSSA   repeat negative 9/4, 9/8  CXR with b/l opacities, R increased  L ear edema and otitis externa, the last cx showed candida and ENT stated it could be fungal (saw black spores?)    initial resp failure for ?fluid ovrer load but also had MSSA in the sputum, got vanco, aztreonam one day then cefepime and had rash, renal failure which was considered due to cefepime and then received 2 days of cefazolin and then off, now with trach and bronc on 9/1 with MSSA bacteremia and BAL also MSSA,   another ET cx with MSSA and ESBL E-coli  hypotension, MSSA bacteremia likely due to pneumonia, repeat blood cx negative 9/4, TTE limited unable to exclude endocarditis, s/p a 4 week course of vanco then dapto through 10/2  L otitis externa on ana cristina since 9/1 but worsening edema and black discoloration with bullae forming and persistent fever (ear cx was negative)  Ct showed acute on chronic otitis externa and otomastoiditis , superimposed cholesteatoma can not be excluded, no malignant otitis externa  pt again febrile, ENT stated there was black spores which could be a fungal infection and the last cx showed candida albicans s/p 7 days of fluconazole   new erythematous patchy rash, did not improved after discontinuing the ana cristina so vanco switched to dapto but still with rash and it also started after pt was started on fluconazole so not sure which caused it  also hypotensive now and ?uveitis vs endophthalmitis, head CT with acute/subacute ischemia and old occipital infarct, chest/abd CT with unchanged  RUL consolidation, decreased BEVERLY consolidation  pt uremic as well, s/p shiley and resumed on HD 9/27  s/p bronc with excessive dynamic airway collapse s/p trach change and some improvement  pt febrile and tachy on 9/29, sputum cx with carbapenem resistant pseudomonas (S to zosyn) and proteus   ana cristina was switched to zosyn 10/1, again with rash 10/2, fever and increasing WBC eos, so switched to aztreonam 10/3, but repeat sputum cx with pseudomonas also I to aztreonam and amikacin, and pt still with fever and thick secretions   * discontinue aztreonam and start polymixin  * asked the lab to send sensitivities for polymixin and recarbrio   * ENT stated the otitis external completely resolved  * monitor CBC/diff and renal function  * f/u with ophthalmology, on valtrex as per ophthalmology     The above assessment and plan was discussed with the primary team    Yumiko Conner MD  contact on teams  After 5pm and on weekends call [phone]       72 YO F with PMHx of IDDM2, HTN, Diabetic Nephropathic CKD, HFpEF, Breast Cancer s/p BL mastectomy, chemotherapy and radiation (2018), Respiratory and Cardiac Arrest (2018), 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 While in MICU patient noted with worsening renal failure requiring HD initiation, CGE/ Melena s/p EGD 8/31 found with esophagitis and erosive gastropathy, new right cerebellar infarct and fevers second to ESBL COLI and MSSA VAP, MSSA bacteremia, left ear otitis externa and drug rxn to cephalosporins. 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 Case discussed at length renal and given good UOP attempted renal recovery, however failed, and upon reinitiation of HD attempt. R IJ SHILEY failed. Attempted volume removal with Bumex GTT however course complicated by hypotension requiring transfer back to MICU 9/26 for pressor support. Diuresis dc'ed, pressors weaned off and stress steroids started. L IJ SHILEY placed (9/30) and HD reinstated. Course further complicated by AOCD and  PSA and Proteus VAP. Patient transferred back to RCU 10/1 with course complicated by volume overload and grossly resistant organisms.    NEUROLOGY  # NEW cerebella infarct   - Baseline MS AOX3 with short term memory changes per family however noted with concern for poor mentation in ICU  - MRI (8/17) with NEW right cerebellar infarct and old left PCA/occipital infarcts  - STEPHANIE (8/17) with AV showing two linear mobile echogenic elements attached to valve leaflets consistent with Lambel's excrescence, but no TRINITY thrombus, and lambel's excrescence with uncertain clinical implication.   - EEG (8/11-8/15) with no seizures   - ILR interrogation (9/7) with SR and PACs falsely recorded as AF.   - Continue on ASA and lipitor for medical management     # Seizures   - Course complicated by questionable head and body shaking with prolactin elevated 9/8. Discussed for spot EEG however held given low likelihood of seizures noted and acute respiratory illnesses   - Course further complicated with right arm twitching and RPT EEG (10/4) with no seizure however but noted with increase seizure potential in left posterior quadrants.   - RPT EEG (10/5) with possible focal seizures and risk of focal-onset seizures from multiple locations, most prominent in the left posterior temporal/posterocentral region  - RPT EEG (10/6) with RUE twitching are likely non-epileptic in nature, however risk of focal-onset seizures from multiple locations  - Continue on Keppra PPX     # Metabolic vs Uremic Encephalopathy   - Lethargy noted with progression to stupor thought to be second to uremia.   - RPT CTH (9/26) with vague areas of hypoattenuation within the bilateral cerebellar hemispheres which may be compatible with acute/subacute ischemia.   - Discussed CTH with neurology and no signs of new infarct noted.   - HD reinstated in ICU with improvement in uremia however mentation remained   - Poor mentation likely in setting of infections.   - Monitor mentation closely     CARDIOVASCULAR  # HTN Urgency   # Septic vs vasoplegic shock   - Labile BPs ranging in between SBP 80s-200s and attempted labetalol, however noted with hypotension and bradycardia likely from BB toxicity vs shock state?    - Holding Labetalol, Catapres and Catapres.    - Attempted volume removal with bumex diuresis however noted with return of shock state requiring pressor support and transfer back to ICU.   - Pressors weaned off to stress dose (last day 10/4) and Midodrine   - Continue on Midodrine 30 TID (9/29 - ) and wean as tolerated however will likely need for adequate volume removal.     # Hx of HFpEF with likely ADHF with volume overload.   - ECHO (7/2023) with EF 59 with severe LVH and diastolic dysfunction   - STEPHANIE (8/18) with normal LVRVSF however noted with increased LA pressures and persistent LA septal bowing into RA.   - ECHO (8/11) with EF 60 with mild LVH, normal LVRVSF, and mild ddfxn.  - Grossly volume overloaded and removing volume with HD sessions     HEENT   # Left ear OE with acute on chronic left-sided otomastoiditis  - ENT evaluation (9/7) with no mastoid tenderness, however found with positive swelling and excoriation to left auricle and tenderness to manipulation of auricle. Debrided crusting of auricle and EAC evaluated with edema and unable to visualize TM. EAC debrided and found with watery exudate.   - CT IAC with acute on chronic left-sided otitis externa and acute on chronic left-sided otomastoiditis. Superimposed left-sided tympanosclerosis. Superimposed cholesteatoma formation within the left tympanomastoid cavity cannot be excluded  - Completed CiproHC (9/5 - 9/16), Bradford (9/1-10/1) and acetic acid and bacitracin.   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   - Continue preservative free artificial tears 4 times a day in the both eye  - Continue lacrilube ointment twice a day in the both eyes   - Continue on empiric valtrex 500mg BID (9/29 - ) per ophtho    # Oral Lesions with questionable Zoster vs Trauma?   - Patient with tongue pain and seen with anterior ulcerations consolidating and crusting and posterior ulceration.  - Case discussed with pathology resident and culture/ cytology sent.   - HSV negative and Path (9/6) with normal appearing epithelial cells singly and in clusters devoid of viral cytopathic effect.   - Continue on magic mouth wash for pain    RESPIRATORY  # AHHRF second to volume overload   - Hx of CKD and HFpEF presented with SOB and hypercarbia second to volume overload requiring intubation and HD initiation   - Vent weaning attempted however limited requiring tracheostomy (9/1) with IP   - Course complicated by PIPs elevated and found with tracheomalacia and volume overloaded.   - CT CHEST (9/8) with tracheomalacia, BL pleural effusions and continued consolidations   - Continue on vent and given TBM increased PEEP to 8 with improvement in dynamic collapse, but PIP waxes and wanes with volume overloaded and secretions. Continue on duonebs and HTS with volume removal with HD   - SBT when mentation improves.      GI  # UGIB   - CGE x 1 episode on 8/24 and melena x 2 episodes on 8/31 likely residual blood.   - EGD (8/31) found with esophagitis and erosive gastropathy  - Continue on PPI BID through late October and then PPI QD     # Dysphagia   - NGT-TF continued   - Pending PEG however needs improvement prior to placement.     RENAL  # Progressive CKD   - Presented volume overload and progressive RUSS on CKD   - POCUS with no signs of hydronephrosis in ICU  - Pressor support started with improvement in renal function in ICU  - Attempted steroid course in ICU without improvement in renal function   - Case discussed at length with renal and initiated on HD in ICU   - Remain on HD while in RCU however noted to be volume overloaded. Attempted Bumex pushes and patient noted with good UOP.   - Attempted renal recovery in RCU however noted with decent UOP, but BUN/ CRE continued to rise without improvement on diuresis.   - Ultimately resumed on HD (9/27, 9/28, 9/30, 10/2 and 10/4).   - Next HD session today however volume overloaded and plan for additional UF session tomorrow     # Hyponatremia   - Continue on salt tabs 1G  (decreased 10/6) and weaning off as tolerated with UF sessions  - Monitor sodium     # Hyperphosphatemia to Hypophosphatemia with HD  - PTH normal and elevated phos likely from renal failure  - Phos binder with Renvela started with improvement however then noted with hypophosphatemia and Renvela stopped.     INFECTIOUS DISEASE  # ESBL ECOLI and MSSA VAP c/b MSSA bacteremia   - RVP/ COVID (8/11) negative   - SCx (8/11) with MSSA s/p cefepime (8/12-8/13) and then ancef (8/14) however noted with drug reaction and then changed to vanco and aztreonam (8/12-8/13) in ICU .   - Course complicated by recurrent fevers and return of MSSA with bacteremia.   - SCx (9/1) with MSSA and ESBL ECOLI   - BAL (9/1) with MSSA   - BCx (9/1) with MSSA and cleared on (9/4)   - ECHO (9/6) unable to rule out endocarditis   - Continue on Vanco (9/4-9/22) however noted with rashes of uncertain etiology and replaced with Bradford (9/4 - 10/2) and DAPTO (9/26-10/2) for  completion.     # Proteus and  PSA VAP/ Trachitis   - Continued fevers and SCx (9/29) with MDR  PSA and Proteus   - Continued on Bradford as above however noted to be resistant and changed to Zosyn (10/2 - 10/5) with course complicated by possible drug rxn. Zosyn changed to Aztreonam (10/5 - ).   - RPT SCx (10/3) with  PSA however only intermediate coverage to aztreonam and amikacin and grossly resistant to other antibiotics. PSA likely colonizer and proteus sensitive to aztreonam on prior culture.   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  - Monitor HH     VASCULAR   # LLE POP DVT   - US BL LE (9/10) with acute left deep vein thrombosis of the left popliteal vein.  - RUE swollen and VA DOPPLER RUE (10/3) with R IJ DVT and R brachial DVT.  - Continue on Eliquis     # HD access  - L IJ CLAUDIA (9/27 - )   - Planned for IR permacath conversion on Tuesday 10/10  - Eliquis to fall off on Saturday (needs to be off x 48 hours prior to procedure).   - Resume Eliquis post procedure.     ONC   # Hx of Breast CA   - Patient dx in 2018 and s/p BL mastectomy (radical on right) and chemo and RT.   - Supportive care.     ENDOCRINE  # IDDM2   - Continue on NPH with ISS, however noted with hypoglycemic episodes requiring LRD5.   - Given hypervolemic will hold further IVF and DC NPH.   - Continue on ISS for now and increase to moderate scale vs reintroducing NPH at low dose if needed.     SKIN  # Drug Eruption   - Attempted cefepime (8/12) vs ancef (8/13-8/14) and then noted with drug rxn in ICU. Continue on steroids with prednisone 40 (8/18 - 9/2) then prednisone 30 (9/3-9/7), then prednisone 20 (9/8 - 9/10), then prednisone 10mg (9/11-9/13) then turn off.   - Attempted Zosyn (10/2-10/5) with possible rash. Zosyn turned off with improvement.   - Hold further cephalosporins or PCNs at this time   - Monitor for further drug rxns.     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 While in MICU patient noted with worsening renal failure requiring HD initiation, CGE/ Melena s/p EGD 8/31 found with esophagitis and erosive gastropathy, new right cerebellar infarct and fevers second to ESBL COLI and MSSA VAP, MSSA bacteremia, left ear otitis externa and drug rxn to cephalosporins. 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 Case discussed at length renal and given good UOP attempted renal recovery, however failed, and upon reinitiation of HD attempt. R IJ SHILEY failed. Attempted volume removal with Bumex GTT however course complicated by hypotension requiring transfer back to MICU 9/26 for pressor support. Diuresis dc'ed, pressors weaned off and stress steroids started. L IJ SHILEY placed (9/30) and HD reinstated. Course further complicated by AOCD and  PSA and Proteus VAP. Patient transferred back to RCU 10/1 with course complicated by volume overload and grossly resistant organisms.    NEUROLOGY  # NEW cerebella infarct   - Baseline MS AOX3 with short term memory changes per family however noted with concern for poor mentation in ICU  - MRI (8/17) with NEW right cerebellar infarct and old left PCA/occipital infarcts  - STEPHANIE (8/17) with AV showing two linear mobile echogenic elements attached to valve leaflets consistent with Lambel's excrescence, but no TRINITY thrombus, and lambel's excrescence with uncertain clinical implication.   - EEG (8/11-8/15) with no seizures   - ILR interrogation (9/7) with SR and PACs falsely recorded as AF.   - Continue on ASA and lipitor for medical management     # Seizures   - Course complicated by questionable head and body shaking with prolactin elevated 9/8. Discussed for spot EEG however held given low likelihood of seizures noted and acute respiratory illnesses   - Course further complicated with right arm twitching and RPT EEG (10/4) with no seizure however but noted with increase seizure potential in left posterior quadrants.   - RPT EEG (10/5) with possible focal seizures and risk of focal-onset seizures from multiple locations, most prominent in the left posterior temporal/posterocentral region  - RPT EEG (10/6) with RUE twitching are likely non-epileptic in nature, however risk of focal-onset seizures from multiple locations  - Continue on Keppra PPX   - EEG preliminary Clinical Impression:  -Risk of focal-onset seizures from left posterior quadrant and right central region.   -Moderate diffuse cerebral dysfunction is nonspecific in etiology, with some metabolic component.  -No seizures      # Metabolic vs Uremic Encephalopathy   - Lethargy noted with progression to stupor thought to be second to uremia.   - RPT CTH (9/26) with vague areas of hypoattenuation within the bilateral cerebellar hemispheres which may be compatible with acute/subacute ischemia.   - Discussed CTH with neurology and no signs of new infarct noted.   - HD reinstated in ICU with improvement in uremia however mentation remained   - Poor mentation likely in setting of infections.   - Monitor mentation closely     CARDIOVASCULAR  # HTN Urgency   # Septic vs vasoplegic shock   - Labile BPs ranging in between SBP 80s-200s and attempted labetalol, however noted with hypotension and bradycardia likely from BB toxicity vs shock state?    - Holding Labetalol, Catapres and Catapres.    - Attempted volume removal with bumex diuresis however noted with return of shock state requiring pressor support and transfer back to ICU.   - Pressors weaned off to stress dose (last day 10/4) and Midodrine   - Continue on Midodrine 30 TID (9/29 - ) and wean as tolerated however will likely need for adequate volume removal.   -Will raise parameter to hold midodrine to over 130. Midodrine held this am and follow up sbp in 80's    # Hx of HFpEF with likely ADHF with volume overload.   - ECHO (7/2023) with EF 59 with severe LVH and diastolic dysfunction   - STEPHANIE (8/18) with normal LVRVSF however noted with increased LA pressures and persistent LA septal bowing into RA.   - ECHO (8/11) with EF 60 with mild LVH, normal LVRVSF, and mild ddfxn.  - Grossly volume overloaded and removing volume with HD sessions   - Scheduled for UF 10/7    HEENT   # Left ear OE with acute on chronic left-sided otomastoiditis  - ENT evaluation (9/7) with no mastoid tenderness, however found with positive swelling and excoriation to left auricle and tenderness to manipulation of auricle. Debrided crusting of auricle and EAC evaluated with edema and unable to visualize TM. EAC debrided and found with watery exudate.   - CT IAC with acute on chronic left-sided otitis externa and acute on chronic left-sided otomastoiditis. Superimposed left-sided tympanosclerosis. Superimposed cholesteatoma formation within the left tympanomastoid cavity cannot be excluded  - Completed CiproHC (9/5 - 9/16), Bradford (9/1-10/1) and acetic acid and bacitracin.   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   - Continue preservative free artificial tears 4 times a day in the both eye  - Continue lacrilube ointment twice a day in the both eyes   - Continue on empiric valtrex 500mg BID (9/29 - ) per ophtho    # Oral Lesions with questionable Zoster vs Trauma?   - Patient with tongue pain and seen with anterior ulcerations consolidating and crusting and posterior ulceration.  - Case discussed with pathology resident and culture/ cytology sent.   - HSV negative and Path (9/6) with normal appearing epithelial cells singly and in clusters devoid of viral cytopathic effect.   - Continue on magic mouth wash for pain    RESPIRATORY  # AHHRF second to volume overload   - Hx of CKD and HFpEF presented with SOB and hypercarbia second to volume overload requiring intubation and HD initiation   - Vent weaning attempted however limited requiring tracheostomy (9/1) with IP   - Course complicated by PIPs elevated and found with tracheomalacia and volume overloaded.   - CT CHEST (9/8) with tracheomalacia, BL pleural effusions and continued consolidations   - Continue on vent and given TBM increased PEEP to 8 with improvement in dynamic collapse, but PIP waxes and wanes with volume overloaded and secretions. Continue on duonebs and HTS with volume removal with HD   - SBT when mentation improves.      GI  # UGIB   - CGE x 1 episode on 8/24 and melena x 2 episodes on 8/31 likely residual blood.   - EGD (8/31) found with esophagitis and erosive gastropathy  - Continue on PPI BID through late October and then PPI QD     # Dysphagia   - NGT-TF continued   - Pending PEG however needs improvement prior to placement.     RENAL  # Progressive CKD   - Presented volume overload and progressive RUSS on CKD   - POCUS with no signs of hydronephrosis in ICU  - Pressor support started with improvement in renal function in ICU  - Attempted steroid course in ICU without improvement in renal function   - Case discussed at length with renal and initiated on HD in ICU   - Remain on HD while in RCU however noted to be volume overloaded. Attempted Bumex pushes and patient noted with good UOP.   - Attempted renal recovery in RCU however noted with decent UOP, but BUN/ CRE continued to rise without improvement on diuresis.   - Ultimately resumed on HD (9/27, 9/28, 9/30, 10/2 and 10/4).   - Next HD session today however volume overloaded and plan for additional UF session tomorrow     # Hyponatremia   - Continue on salt tabs 1G  (decreased 10/6) and weaning off as tolerated with UF sessions  - Monitor sodium     # Hyperphosphatemia to Hypophosphatemia with HD  - PTH normal and elevated phos likely from renal failure  - Phos binder with Renvela started with improvement however then noted with hypophosphatemia and Renvela stopped.     INFECTIOUS DISEASE  # ESBL ECOLI and MSSA VAP c/b MSSA bacteremia   - RVP/ COVID (8/11) negative   - SCx (8/11) with MSSA s/p cefepime (8/12-8/13) and then ancef (8/14) however noted with drug reaction and then changed to vanco and aztreonam (8/12-8/13) in ICU .   - Course complicated by recurrent fevers and return of MSSA with bacteremia.   - SCx (9/1) with MSSA and ESBL ECOLI   - BAL (9/1) with MSSA   - BCx (9/1) with MSSA and cleared on (9/4)   - ECHO (9/6) unable to rule out endocarditis   - Continue on Vanco (9/4-9/22) however noted with rashes of uncertain etiology and replaced with Bradford (9/4 - 10/2) and DAPTO (9/26-10/2) for  completion.     # Proteus and  PSA VAP/ Trachitis   - Continued fevers and SCx (9/29) with MDR  PSA and Proteus   - Continued on [place] as above however noted to be resistant and changed to Zosyn (10/2 - 10/5) with course complicated by possible drug rxn. Zosyn changed to Aztreonam (10/5 - ).   - RPT SCx (10/3) with  PSA however only intermediate coverage to aztreonam and amikacin and grossly resistant to other antibiotics. PSA likely colonizer and proteus sensitive to aztreonam on prior culture.   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  - Monitor HH     VASCULAR   # LLE POP DVT   - US BL LE (9/10) with acute left deep vein thrombosis of the left popliteal vein.  - RUE swollen and VA DOPPLER RUE (10/3) with R IJ DVT and R brachial DVT.  - Continue on Eliquis     # HD access  - L IJ CLAUDIA (9/27 - )   - Planned for IR permacath conversion on Tuesday 10/10  - Eliquis to fall off on Saturday (needs to be off x 48 hours prior to procedure).   - Resume Eliquis post procedure.     ONC   # Hx of Breast CA   - Patient dx in 2018 and s/p BL mastectomy (radical on right) and chemo and RT.   - Supportive care.     ENDOCRINE  # IDDM2   - Continue on NPH with ISS, however noted with hypoglycemic episodes requiring LRD5.   - Given hypervolemic will hold further IVF and DC NPH.   - Continue on ISS for now and increase to moderate scale vs reintroducing NPH at low dose if needed.     SKIN  # Drug Eruption   - Attempted cefepime (8/12) vs ancef (8/13-8/14) and then noted with drug rxn in ICU. Continue on steroids with prednisone 40 (8/18 - 9/2) then prednisone 30 (9/3-9/7), then prednisone 20 (9/8 - 9/10), then prednisone 10mg (9/11-9/13) then turn off.   - Attempted Zosyn (10/2-10/5) with possible rash. Zosyn turned off with improvement.   - Hold further cephalosporins or PCNs at this time   - Monitor for further drug rxns.     ETHICS/ GOC    - Attempted palliative discussion however family not interested and wishes for FULL CODE    DISPO - TBD

## 2023-10-07 NOTE — PROGRESS NOTE ADULT - SUBJECTIVE AND OBJECTIVE BOX
NEPHROLOGY     Patient seen and examined with spouse at bedside, trach to vent, in no acute distress. She had HD yesterday and removed 1.8L.     MEDICATIONS  (STANDING):  albuterol/ipratropium for Nebulization 3 milliLiter(s) Nebulizer every 6 hours  apixaban 5 milliGRAM(s) Oral every 12 hours  artificial tears (preservative free) Ophthalmic Solution 1 Drop(s) Both EYES every 4 hours  aspirin  chewable 81 milliGRAM(s) Enteral Tube daily  atorvastatin 10 milliGRAM(s) Oral at bedtime  chlorhexidine 0.12% Liquid 15 milliLiter(s) Oral Mucosa every 12 hours  chlorhexidine 2% Cloths 1 Application(s) Topical daily  dextrose 5%. 1000 milliLiter(s) (50 mL/Hr) IV Continuous <Continuous>  dextrose 5%. 1000 milliLiter(s) (100 mL/Hr) IV Continuous <Continuous>  dextrose 50% Injectable 12.5 Gram(s) IV Push once  dextrose 50% Injectable 25 Gram(s) IV Push once  dextrose 50% Injectable 25 Gram(s) IV Push once  dextrose 50% Injectable 25 Gram(s) IV Push once  epoetin odalis (EPOGEN) Injectable 65218 Unit(s) IV Push <User Schedule>  ferrous    sulfate Liquid 300 milliGRAM(s) Oral daily  glucagon  Injectable 1 milliGRAM(s) IntraMuscular once  insulin lispro (ADMELOG) corrective regimen sliding scale   SubCutaneous every 6 hours  insulin NPH human recombinant 2 Unit(s) SubCutaneous every 6 hours  levETIRAcetam  Solution 1000 milliGRAM(s) Oral daily  levETIRAcetam  Solution 250 milliGRAM(s) Oral <User Schedule>  melatonin 3 milliGRAM(s) Oral at bedtime  midodrine. 30 milliGRAM(s) Oral every 8 hours  pantoprazole  Injectable 40 milliGRAM(s) IV Push two times a day  petrolatum Ophthalmic Ointment 1 Application(s) Both EYES two times a day  polymyxin B IVPB 148816 Unit(s) IV Intermittent every 12 hours  sodium chloride 1 Gram(s) Oral two times a day  sodium chloride 3%  Inhalation 4 milliLiter(s) Inhalation every 6 hours  valACYclovir 500 milliGRAM(s) Oral every 24 hours    VITALS:  T(C): , Max: 38.5 (10-06-23 @ 20:51)  T(F): , Max: 101.3 (10-06-23 @ 20:51)  HR: 102 (10-07-23 @ 13:26)  BP: 92/20 (10-07-23 @ 13:26)  BP(mean): 66 (10-07-23 @ 05:56)  RR: 22 (10-07-23 @ 13:26)  SpO2: 100% (10-07-23 @ 13:26)    I and O's:    10-06 @ 07:01  -  10-07 @ 07:00  --------------------------------------------------------  IN: 540 mL / OUT: 0 mL / NET: 540 mL    PHYSICAL EXAM:  Constitutional: minimally communicative at present; on vent  HEENT: trach-vent, (+)NGT  Respiratory: coarse BS b/l  Cardiovascular: tachy reg s1s2  Gastrointestinal: BS+, soft, NT/ND  Extremities: + peripheral edema  Neurological: tone WNL  Skin: No rashes  Access: (+)Trist Zykis    LABS:                        8.1    11.81 )-----------( 348      ( 06 Oct 2023 16:40 )             25.5     10-06    138  |  102  |  32<H>  ----------------------------<  173<H>  3.5   |  28  |  1.18    Ca    8.3<L>      06 Oct 2023 16:40  Phos  1.7     10-06  Mg     1.80     10-06

## 2023-10-07 NOTE — PROGRESS NOTE ADULT - NS ATTEND AMEND GEN_ALL_CORE FT
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 ESRD s/p ICU and intubation. Course further c/b GIB s/p EGD, new R cerebellar infarct, and pneumonia/bacteremia, and prolonged respiratory failure require tracheostomy.    - Remains minimally responsive. Resolved RUE focal twitching. EEG with possible focal seizure with RUE twitching and at risk of focal onset seizures from multiple locations, especially left posterior temporal/posterocentral region, likely due to encephalomalacia/gliosis from prior left parietal CVA  - on levetiracetam.  Repeat CT head on 10/3 with no acute changes. Ammonia normal. BUN improved. okay to DC vEEG per neuro. appreciate neurology input  - repeat HD for fluid removal today, continue Epoetin per nephrology.   - sputum culture with numerous carbapenem-resistant Pseudomonas aeruginosa, now intermediate to aztreonam. However, she is allergic to PCNs and cephalosporins. Appreciate ID follow-up, start Polymyxin B. Appreciate ENT follow-up for left ear otitis externa, which has now resolved. Follow-up ophthalmology regarding keratitis + chemosis, on Lacrilube and artificial tears.   - Continue midodrine 30 mg q8h. Continue aspirin and statin given h/o CVA  - Continue lung protective ventilation. Airway clearance therapy with Duonebs and hypertonic saline.  - Continue tube feeds as tolerates. PPI for GI ppx.  - On apixaban for DVT. Hold apixaban after evening dose today for Permacath next week.  - Overall prognosis guarded, remains full code. Palliative care follow-up

## 2023-10-07 NOTE — PROGRESS NOTE ADULT - SUBJECTIVE AND OBJECTIVE BOX
CHIEF COMPLAINT: Patient is a 73y old  Female who presents with a chief complaint of Respiratory distress (06 Oct 2023 18:10)      Interval Events:    REVIEW OF SYSTEMS:  [ ] All other systems negative  [ ] Unable to assess ROS because ________    Mode: AC/ CMV (Assist Control/ Continuous Mandatory Ventilation), RR (machine): 22, TV (machine): 340, FiO2: 35, PEEP: 8, ITime: 0.8, MAP: 19, PIP: 54      OBJECTIVE:  ICU Vital Signs Last 24 Hrs  T(C): 37.4 (07 Oct 2023 08:37), Max: 38.5 (06 Oct 2023 20:51)  T(F): 99.4 (07 Oct 2023 08:37), Max: 101.3 (06 Oct 2023 20:51)  HR: 100 (07 Oct 2023 09:21) (100 - 134)  BP: 95/24 (07 Oct 2023 08:37) (95/24 - 122/44)  BP(mean): 66 (07 Oct 2023 05:56) (49 - 66)  ABP: --  ABP(mean): --  RR: 22 (07 Oct 2023 08:37) (19 - 22)  SpO2: 99% (07 Oct 2023 09:21) (96% - 100%)    O2 Parameters below as of 07 Oct 2023 08:37  Patient On (Oxygen Delivery Method): ventilator, AC    O2 Concentration (%): 35      Mode: AC/ CMV (Assist Control/ Continuous Mandatory Ventilation), RR (machine): 22, TV (machine): 340, FiO2: 35, PEEP: 8, ITime: 0.8, MAP: 19, PIP: 54    10-06 @ 07:01  -  10-07 @ 07:00  --------------------------------------------------------  IN: 540 mL / OUT: 0 mL / NET: 540 mL      CAPILLARY BLOOD GLUCOSE      POCT Blood Glucose.: 285 mg/dL (07 Oct 2023 06:07)      HOSPITAL MEDICATIONS:  MEDICATIONS  (STANDING):  albuterol/ipratropium for Nebulization 3 milliLiter(s) Nebulizer every 6 hours  apixaban 5 milliGRAM(s) Oral every 12 hours  artificial tears (preservative free) Ophthalmic Solution 1 Drop(s) Both EYES every 4 hours  aspirin  chewable 81 milliGRAM(s) Enteral Tube daily  atorvastatin 10 milliGRAM(s) Oral at bedtime  chlorhexidine 0.12% Liquid 15 milliLiter(s) Oral Mucosa every 12 hours  chlorhexidine 2% Cloths 1 Application(s) Topical daily  dextrose 5%. 1000 milliLiter(s) (50 mL/Hr) IV Continuous <Continuous>  dextrose 5%. 1000 milliLiter(s) (100 mL/Hr) IV Continuous <Continuous>  dextrose 50% Injectable 12.5 Gram(s) IV Push once  dextrose 50% Injectable 25 Gram(s) IV Push once  dextrose 50% Injectable 25 Gram(s) IV Push once  dextrose 50% Injectable 25 Gram(s) IV Push once  epoetin odalis (EPOGEN) Injectable 15023 Unit(s) IV Push <User Schedule>  ferrous    sulfate Liquid 300 milliGRAM(s) Oral daily  glucagon  Injectable 1 milliGRAM(s) IntraMuscular once  insulin lispro (ADMELOG) corrective regimen sliding scale   SubCutaneous every 6 hours  levETIRAcetam  Solution 1000 milliGRAM(s) Oral daily  levETIRAcetam  Solution 250 milliGRAM(s) Oral <User Schedule>  melatonin 3 milliGRAM(s) Oral at bedtime  midodrine. 30 milliGRAM(s) Oral every 8 hours  pantoprazole  Injectable 40 milliGRAM(s) IV Push two times a day  petrolatum Ophthalmic Ointment 1 Application(s) Both EYES two times a day  polymyxin B IVPB 256181 Unit(s) IV Intermittent every 12 hours  sodium chloride 1 Gram(s) Oral two times a day  sodium chloride 3%  Inhalation 4 milliLiter(s) Inhalation every 6 hours  valACYclovir 500 milliGRAM(s) Oral every 24 hours    MEDICATIONS  (PRN):  acetaminophen   Oral Liquid .. 650 milliGRAM(s) Oral every 6 hours PRN Temp greater or equal to 38C (100.4F), Mild Pain (1 - 3)  calamine/zinc oxide Lotion 1 Application(s) Topical two times a day PRN Rash and/or Itching  dextrose Oral Gel 15 Gram(s) Oral once PRN Blood Glucose LESS THAN 70 milliGRAM(s)/deciliter  sodium chloride 0.9% lock flush 10 milliLiter(s) IV Push every 1 hour PRN Pre/post blood products, medications, blood draw, and to maintain line patency      LABS:                        8.1    11.81 )-----------( 348      ( 06 Oct 2023 16:40 )             25.5     10-06    138  |  102  |  32<H>  ----------------------------<  173<H>  3.5   |  28  |  1.18    Ca    8.3<L>      06 Oct 2023 16:40  Phos  1.7     10-06  Mg     1.80     10-06        Urinalysis Basic - ( 06 Oct 2023 16:40 )    Color: x / Appearance: x / SG: x / pH: x  Gluc: 173 mg/dL / Ketone: x  / Bili: x / Urobili: x   Blood: x / Protein: x / Nitrite: x   Leuk Esterase: x / RBC: x / WBC x   Sq Epi: x / Non Sq Epi: x / Bacteria: x            PAST MEDICAL & SURGICAL HISTORY:  Breast CA      Diabetes      Stroke      Cardiac arrest      HTN (hypertension)      H/O mastectomy, bilateral          FAMILY HISTORY:  No pertinent family history in first degree relatives        Social History:      RADIOLOGY:  [ ] Reviewed and interpreted by me    PULMONARY FUNCTION TESTS:    EKG: CHIEF COMPLAINT: Patient is a 73y old  Female who presents with a chief complaint of Respiratory distress (06 Oct 2023 18:10)      Interval Events: Pt seen at bedside, spoke with .  NPH stopped with accuchecks >250 and restarted at lower dose For UF today     REVIEW OF SYSTEMS:  [x ] Unable to assess ROS because AMS    Mode: AC/ CMV (Assist Control/ Continuous Mandatory Ventilation), RR (machine): 22, TV (machine): 340, FiO2: 35, PEEP: 8, ITime: 0.8, MAP: 19, PIP: 54      OBJECTIVE:  ICU Vital Signs Last 24 Hrs  T(C): 37.4 (07 Oct 2023 08:37), Max: 38.5 (06 Oct 2023 20:51)  T(F): 99.4 (07 Oct 2023 08:37), Max: 101.3 (06 Oct 2023 20:51)  HR: 100 (07 Oct 2023 09:21) (100 - 134)  BP: 95/24 (07 Oct 2023 08:37) (95/24 - 122/44)  BP(mean): 66 (07 Oct 2023 05:56) (49 - 66)  ABP: --  ABP(mean): --  RR: 22 (07 Oct 2023 08:37) (19 - 22)  SpO2: 99% (07 Oct 2023 09:21) (96% - 100%)    O2 Parameters below as of 07 Oct 2023 08:37  Patient On (Oxygen Delivery Method): ventilator, AC    O2 Concentration (%): 35      Mode: AC/ CMV (Assist Control/ Continuous Mandatory Ventilation), RR (machine): 22, TV (machine): 340, FiO2: 35, PEEP: 8, ITime: 0.8, MAP: 19, PIP: 54    10-06 @ 07:01  -  10-07 @ 07:00  --------------------------------------------------------  IN: 540 mL / OUT: 0 mL / NET: 540 mL      CAPILLARY BLOOD GLUCOSE      POCT Blood Glucose.: 285 mg/dL (07 Oct 2023 06:07)      HOSPITAL MEDICATIONS:  MEDICATIONS  (STANDING):  albuterol/ipratropium for Nebulization 3 milliLiter(s) Nebulizer every 6 hours  apixaban 5 milliGRAM(s) Oral every 12 hours  artificial tears (preservative free) Ophthalmic Solution 1 Drop(s) Both EYES every 4 hours  aspirin  chewable 81 milliGRAM(s) Enteral Tube daily  atorvastatin 10 milliGRAM(s) Oral at bedtime  chlorhexidine 0.12% Liquid 15 milliLiter(s) Oral Mucosa every 12 hours  chlorhexidine 2% Cloths 1 Application(s) Topical daily  dextrose 5%. 1000 milliLiter(s) (50 mL/Hr) IV Continuous <Continuous>  dextrose 5%. 1000 milliLiter(s) (100 mL/Hr) IV Continuous <Continuous>  dextrose 50% Injectable 12.5 Gram(s) IV Push once  dextrose 50% Injectable 25 Gram(s) IV Push once  dextrose 50% Injectable 25 Gram(s) IV Push once  dextrose 50% Injectable 25 Gram(s) IV Push once  epoetin odalis (EPOGEN) Injectable 02383 Unit(s) IV Push <User Schedule>  ferrous    sulfate Liquid 300 milliGRAM(s) Oral daily  glucagon  Injectable 1 milliGRAM(s) IntraMuscular once  insulin lispro (ADMELOG) corrective regimen sliding scale   SubCutaneous every 6 hours  levETIRAcetam  Solution 1000 milliGRAM(s) Oral daily  levETIRAcetam  Solution 250 milliGRAM(s) Oral <User Schedule>  melatonin 3 milliGRAM(s) Oral at bedtime  midodrine. 30 milliGRAM(s) Oral every 8 hours  pantoprazole  Injectable 40 milliGRAM(s) IV Push two times a day  petrolatum Ophthalmic Ointment 1 Application(s) Both EYES two times a day  polymyxin B IVPB 204531 Unit(s) IV Intermittent every 12 hours  sodium chloride 1 Gram(s) Oral two times a day  sodium chloride 3%  Inhalation 4 milliLiter(s) Inhalation every 6 hours  valACYclovir 500 milliGRAM(s) Oral every 24 hours    MEDICATIONS  (PRN):  acetaminophen   Oral Liquid .. 650 milliGRAM(s) Oral every 6 hours PRN Temp greater or equal to 38C (100.4F), Mild Pain (1 - 3)  calamine/zinc oxide Lotion 1 Application(s) Topical two times a day PRN Rash and/or Itching  dextrose Oral Gel 15 Gram(s) Oral once PRN Blood Glucose LESS THAN 70 milliGRAM(s)/deciliter  sodium chloride 0.9% lock flush 10 milliLiter(s) IV Push every 1 hour PRN Pre/post blood products, medications, blood draw, and to maintain line patency      LABS:                        8.1    11.81 )-----------( 348      ( 06 Oct 2023 16:40 )             25.5     10-06    138  |  102  |  32<H>  ----------------------------<  173<H>  3.5   |  28  |  1.18    Ca    8.3<L>      06 Oct 2023 16:40  Phos  1.7     10-06  Mg     1.80     10-06        Urinalysis Basic - ( 06 Oct 2023 16:40 )    Color: x / Appearance: x / SG: x / pH: x  Gluc: 173 mg/dL / Ketone: x  / Bili: x / Urobili: x   Blood: x / Protein: x / Nitrite: x   Leuk Esterase: x / RBC: x / WBC x   Sq Epi: x / Non Sq Epi: x / Bacteria: x            PAST MEDICAL & SURGICAL HISTORY:  Breast CA      Diabetes      Stroke      Cardiac arrest      HTN (hypertension)      H/O mastectomy, bilateral          FAMILY HISTORY:  No pertinent family history in first degree relatives        Social History:      RADIOLOGY:  [ ] Reviewed and interpreted by me    PULMONARY FUNCTION TESTS:    EKG:

## 2023-10-07 NOTE — PROGRESS NOTE ADULT - ASSESSMENT
IMPRESSION: 73F w/ HTN, DM2, CVA, breast CA-bilateral mastectomy, recurrent aspiration pneumonia/respiratory failure, and CKD, 8/11/23 p/w acute hypercapnic respiratory failure; c/b RUSS    (1)Renal - RUSS on CKD4 ==>now newly ESRD. Warranting conversion of shiley to tunneled cath at this point    (2)Lytes - hypokalemia/hypophosphatemia; phos lower s/p repletion yesterday - best that we change tube feeds from Nepro to Glucerna    (3)Pulm- vent-dependent    RECOMMEND:  (1)Change tube feeds from Nepro to Glucerna  (2)Tunneled cath placement 10/10/23 per IR  (3)Will attempt UF today - 2.5 hrs. 2 kg UF as able, pressors as needed to keep SBP> 90  (4)Dose new meds for GFR <10/HD    Nilda Espinoza DNP  [facility]  [phone]        IMPRESSION: 73F w/ HTN, DM2, CVA, breast CA-bilateral mastectomy, recurrent aspiration pneumonia/respiratory failure, and CKD, 8/11/23 p/w acute hypercapnic respiratory failure; c/b RUSS    (1)Renal - RUSS on CKD4 ==>now newly ESRD. Warranting conversion of shiley to tunneled cath at this point    (2)Lytes - hypokalemia/hypophosphatemia; phos lower s/p repletion yesterday - best that we change tube feeds from Nepro to Glucerna    (3)Pulm- vent-dependent    RECOMMEND:  (1)Change tube feeds from Nepro to Glucerna  (2)Tunneled cath placement 10/10/23 per IR  (3)Will attempt UF today - 2.5 hrs. 2 kg UF as able, pressors as needed to keep SBP> 90  (4)Dose new meds for GFR <10/HD    D/w Dr. Viraj Espinoza, [facility]  [phone]        IMPRESSION: 73F w/ HTN, DM2, CVA, breast CA-bilateral mastectomy, recurrent aspiration pneumonia/respiratory failure, and CKD, 8/11/23 p/w acute hypercapnic respiratory failure; c/b RUSS    (1)Renal - RUSS on CKD4 ==>now newly ESRD. Warranting conversion of shiley to tunneled cath at this point    (2)Lytes - hypokalemia/hypophosphatemia; phos lower s/p repletion yesterday - best that we change tube feeds from Nepro to Glucerna    (3)Pulm- vent-dependent    (4)CV- BP soft, on midodrine 30 q8h    RECOMMEND:  (1)Change tube feeds from Nepro to Glucerna  (2)Tunneled cath placement 10/10/23 per IR  (3)Will attempt UF today - 2.5 hrs. 2 kg UF as able, pressors as needed to keep SBP> 90  (4)Dose new meds for GFR <10/HD    D/w Dr. Viraj Espinoza, [facility]  [phone]

## 2023-10-07 NOTE — PROGRESS NOTE ADULT - ASSESSMENT
72 y/o F well known to me from my [facility] outpt practice. she was admitted at [facility] 7/12-7/22 w aspiration PNA, was treated w CEFEPIME, developed an allergic rash,  dCHF, + MAC on AFB culture, had been progressively getting more and more lethargic and dyspneic at home since DC.   In  am of 8/11/23  ptn presented with respiratory distress w hypoxia and hypercarbia requiring intubation 2/2 volume overload +/- Asp PNA      Neuro   responds appropriately   Baseline MS AOx3, aphasic   - h/o CVA , on aspirin and statin . resumed w feeding tube, ASA resumed 8/26  - eeg  2/2 tremors, no sz focus  - less responsive in the past few days  - MRI 8/17:  new R cerebellar infarct, old left PCA/Occipital infarct. 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 Mcnulty, now in MICU  s/p septic shock overnight 9/1, now on meropenem, HDS  s/p trache, on vent support, copious secretions      Renal     Ptn well know to Dr. Colon,following   HD 8/19,  8/21, 8/26 and 8/28.  s/p PUF 8/29 2.5 Liters, HD 8/30,  9/1, 9/4  L IJ HD placed  uremic  hyponatremic  HD as per renal        Hypotension  - Levo drip  prognosis is poor  consider palliative care consult    GI  NPO  on tube feeds  coffee ground emesis x 1 8/24, melena overnight 8/31, s/p 1UPRBC, EGD/Colonoscopy: erosive gastropathy, esophagisits, on colonoscopy old blood seen no active bleeding, poor prep  family to decide re PEG placement    Endocrine  T2DM   A1C 7.1 in 7/2023   - BG goal 140-200     ID   No fever/leukocytosis, recheck temp   Hx latent TB which was treated, no concern for TB   - grew MAC on AFB culture from most recent admission. at [facility]  -MSSA Bacteremia 9/1, allergic to cephalosprins and PCN, on Vanco as per ID, renal dosing,   - sputum also grew E.Coli-ESBL, as per ID recs for  Bradford through 9/7( 1 week course as per ID) , afebrile.  - 9/8:  spike  temp 38.1C, has O. externa, has a wick, recs are to get a CT to R/O an abscess/bony involvement. cont Meropenem  9/9: ptn w fever overnight to 100.8,  may need shiley pulled if bacteremic, needs NECK CT as per ENT to R/O Mastoid bone involvement while having ongoing purulent DC from L ear 2/2 O. externa, getting daily wick changes  9/10:  spiked 102 overnight through Bradford and Vanco, purulent DC from O. externa, ENT recommend Ct of mastoid. ptn in HD, has Shiley in place, consider pulling shiley and send for Cx. would d/w Renal, and consider contacting  ID, last seen by Dr. Conner 9/6 9/11: remains febrile, Dr. Conner reconsulted. cont Bradford, Vanco  9/12: tmax 100.5, fever curve is improving, awaiting Left ear CT, on Bradford since 9/1. on  vanco for MSSA Bacteremia, does not tolerate cephalosporins. through 10/2  9/13: on full vent support via trache, appears uncomfortable and onur 2/2 Left O. externa. has an incidental left middle ear tumor, no acute middle ear interventions recommended, left ear wick replaced. on Meropenem, cx from Ear DC is neg. On Vanco for MSSA bacteremia through 10/2, course of Meropenem as per ID, afebrile in the past 24 hrs . Cardura for HTN resumed, HGB dropped to 6.4, got a dose of EPO and 1UPRBC, rpt 7.8, remains on HEPARIN drip for LEFT popliteal DVT  9/14: cont on Mupirocin locally to Left ear, cont IV Bradford, ENT signed off, afebrile x 48 hrs, Mro course to be determined by ID, on Vanco for MSSA bacteremia through 10/2. ongoing worsening hyponatremia, Hypertonic saline as per renal, no HD today, s/p 1UPRBC 9/13  9/15: spiking temps again, if cont to spike as per ID re PAN scan. cont Vanco for MSSA Bacteremia  9/16-18: no temp overnight  afebrile, cont to have Left ear DC,   on Vanco and ,bradford through 10/2  On IV Fluconazole for fungal cx+ from O. externa Discharge.   fluconazole started 9/21, ptn developed an allergic rash on 9/22. doubt its 2/2 to MEROPENEM or Vanco.  MEropenem was DCed 2/2 causing possible drug rash.  on VANCO based on levels for MSSA bacteremia but DCed 2/2 poss drug rash, now on Daptomycin  9/29: ptn is septic, meropenem resumed, s/p HD 9/28, next HD tomorrow    Heme/Onc  ppx: place on SCD  Transfuse for hgb 6.4, no obv bleed. HDS  LEFT POPLITEAL VEIN ACUTE DVT on 9/10, on Heparin drip    Ethics  GOC - Discussed GOC with daughter and , they have opted in the past for full code. and she remains full code at present      9/27:  In [facility], R IJ HD catheter placed, NGT in place, acidotic on VBG, trache to vent, anasarca, on IV BUMEX, on Levo, on Heparin drip, on stress dose HYdrocortisone, FS in range, uremic, awaiting HD, CT C/A/P w no acute findings. VANCO Dced , now on Dapto, for MSSA baceteremia through 10/2    9/28: HD started 9/27, still encephalopathic, fluconazole DCed as it could be the cause of the rash. on Dapto to complete a course of Abx for MSSA bacteremia through 10/2, VANCO DCed 2/2 possible rash. off pressors    9/30: sepsis resolved, off fluconazole, would add FLUCONAZOLE to the allergy list. on Meropenem, on Dapto through 10/2 instead of Vanco.Vanco was DCed as preseumed ptn had an allergic rash to Vanco. she is not allergic to Vanco. vent dependent. tolerating HD. s/p HD today, next on 10/3    10/1: back in RCU, trach to vent,  tolerating HD well, on Bradford, Dapto, off fluconazole 2/2 allergic rxn    10/2:  trache to vent, completed a course of ABx for MSSA bacteremia TODAY, sputum grew P. aeruginosa resistant to MEROPENEM, ptn was switched to ZOSYN. 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 copious secretions. temp last night. ptn got 2 gm dose of AZACTAM today at 6 pm, ptn seen at 8 pm, has facial swelling and erythema, no stridor, ID made aware. Daughter and  at bedside state the allergic rash post ZOsyn had improved after DC zosyn. last dose of ZOsyn today at 2 pm, prior to that 11 am.   may need to switch to Amikacin if reaction will cont on Azactam. cont Benadryl. rpt Head ct tonight to r/o new CVA. HD as per renal    10/4: HD today, as per renal. no fever overnight, remains on AZTREONAM, rash resolved, next dose tonight    10/5: On aztreonam, afebrile, facial erythema is mild, prob resolving from ZOSYN exposure. on HD as per renal. trache to vent. O. externa resolved. 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 aeruginosa w Interm sens to Aztreonam, now DCed, started on Polymyxin, additional sensitivities being tested as per ID    10/7:  ptn cont to spike temps, FS are above 250, on polymyxin B for P. aeruginosa in sputum, trache to vent, has thick secretions. off eliquis in preparation to convert shiley to permacath in IR on 10/10

## 2023-10-07 NOTE — PROGRESS NOTE ADULT - SUBJECTIVE AND OBJECTIVE BOX
Patient is a 73y old  Female who presents with a chief complaint of Respiratory distress (07 Oct 2023 14:39)      SUBJECTIVE / OVERNIGHT EVENTS: ptn cont to spike temps, FS are above 250, on polymyxin B for P. aeruginosa in sputum, trache to vent, has thick secretions. off eliquis in preparation to convert shiley to permacath in IR on 10/10    MEDICATIONS  (STANDING):  albuterol/ipratropium for Nebulization 3 milliLiter(s) Nebulizer every 6 hours  artificial tears (preservative free) Ophthalmic Solution 1 Drop(s) Both EYES every 4 hours  aspirin  chewable 81 milliGRAM(s) Enteral Tube daily  atorvastatin 10 milliGRAM(s) Oral at bedtime  chlorhexidine 0.12% Liquid 15 milliLiter(s) Oral Mucosa every 12 hours  chlorhexidine 2% Cloths 1 Application(s) Topical daily  dextrose 5%. 1000 milliLiter(s) (50 mL/Hr) IV Continuous <Continuous>  dextrose 5%. 1000 milliLiter(s) (100 mL/Hr) IV Continuous <Continuous>  dextrose 50% Injectable 12.5 Gram(s) IV Push once  dextrose 50% Injectable 25 Gram(s) IV Push once  dextrose 50% Injectable 25 Gram(s) IV Push once  dextrose 50% Injectable 25 Gram(s) IV Push once  epoetin odalis (EPOGEN) Injectable 18419 Unit(s) IV Push <User Schedule>  ferrous    sulfate Liquid 300 milliGRAM(s) Oral daily  glucagon  Injectable 1 milliGRAM(s) IntraMuscular once  insulin lispro (ADMELOG) corrective regimen sliding scale   SubCutaneous every 6 hours  insulin NPH human recombinant 2 Unit(s) SubCutaneous every 6 hours  levETIRAcetam  Solution 1000 milliGRAM(s) Oral daily  levETIRAcetam  Solution 250 milliGRAM(s) Oral <User Schedule>  melatonin 3 milliGRAM(s) Oral at bedtime  midodrine. 30 milliGRAM(s) Oral every 8 hours  pantoprazole  Injectable 40 milliGRAM(s) IV Push two times a day  petrolatum Ophthalmic Ointment 1 Application(s) Both EYES two times a day  polymyxin B IVPB 067635 Unit(s) IV Intermittent every 12 hours  sodium chloride 1 Gram(s) Oral two times a day  sodium chloride 3%  Inhalation 4 milliLiter(s) Inhalation every 6 hours  valACYclovir 500 milliGRAM(s) Oral every 24 hours    MEDICATIONS  (PRN):  acetaminophen   Oral Liquid .. 650 milliGRAM(s) Oral every 6 hours PRN Temp greater or equal to 38C (100.4F), Mild Pain (1 - 3)  calamine/zinc oxide Lotion 1 Application(s) Topical two times a day PRN Rash and/or Itching  dextrose Oral Gel 15 Gram(s) Oral once PRN Blood Glucose LESS THAN 70 milliGRAM(s)/deciliter  sodium chloride 0.9% lock flush 10 milliLiter(s) IV Push every 1 hour PRN Pre/post blood products, medications, blood draw, and to maintain line patency      Vital Signs Last 24 Hrs  T(F): 100 (10-07-23 @ 19:50), Max: 101.3 (10-06-23 @ 20:51)  HR: 100 (10-07-23 @ 19:50) (94 - 134)  BP: 112/37 (10-07-23 @ 19:50) (92/20 - 122/44)  RR: 22 (10-07-23 @ 19:50) (22 - 22)  SpO2: 100% (10-07-23 @ 19:50) (97% - 100%)  Telemetry:   CAPILLARY BLOOD GLUCOSE      POCT Blood Glucose.: 273 mg/dL (07 Oct 2023 17:36)  POCT Blood Glucose.: 288 mg/dL (07 Oct 2023 11:39)  POCT Blood Glucose.: 285 mg/dL (07 Oct 2023 06:07)  POCT Blood Glucose.: 234 mg/dL (06 Oct 2023 23:52)    I&O's Summary    06 Oct 2023 07:01  -  07 Oct 2023 07:00  --------------------------------------------------------  IN: 540 mL / OUT: 0 mL / NET: 540 mL        PHYSICAL EXAM:  GENERAL: NAD, well-developed  HEAD:  Atraumatic, Normocephalic  EYES: EOMI, PERRLA, conjunctiva and sclera clear  NECK: Supple, No JVD  CHEST/LUNG: Clear to auscultation bilaterally; No wheeze  HEART: Regular rate and rhythm; No murmurs, rubs, or gallops  ABDOMEN: Soft, Nontender, Nondistended; Bowel sounds present  EXTREMITIES:  2+ Peripheral Pulses, No clubbing, cyanosis, or edema  PSYCH: AAOx3  NEUROLOGY: non-focal  SKIN: No rashes or lesions    LABS:                        8.1    11.81 )-----------( 348      ( 06 Oct 2023 16:40 )             25.5     10-06    138  |  102  |  32<H>  ----------------------------<  173<H>  3.5   |  28  |  1.18    Ca    8.3<L>      06 Oct 2023 16:40  Phos  1.7     10-06  Mg     1.80     10-06            Urinalysis Basic - ( 06 Oct 2023 16:40 )    Color: x / Appearance: x / SG: x / pH: x  Gluc: 173 mg/dL / Ketone: x  / Bili: x / Urobili: x   Blood: x / Protein: x / Nitrite: x   Leuk Esterase: x / RBC: x / WBC x   Sq Epi: x / Non Sq Epi: x / Bacteria: x        RADIOLOGY & ADDITIONAL TESTS:    Imaging Personally Reviewed:    Consultant(s) Notes Reviewed:      Care Discussed with Consultants/Other Providers:

## 2023-10-07 NOTE — EEG REPORT - NS EEG TEXT BOX
MILANA BALL [id number]     Study Date: 10/6/23 08:00 - 10/7/23 08:00  Duration: 23 hr 58 min  --------------------------------------------------------------------------------------------------  History:  CC/ HPI Patient is a 73y old  Female who presents with a chief complaint of Respiratory distress (05 Oct 2023 22:22)  --------------------------------------------------------------------------------------------------  Study Interpretation:    [[[Abbreviation Key:  PDR=alpha rhythm/posterior dominant rhythm. A-P=anterior posterior.  Amplitude: ‘very low’:<20; ‘low’:20-49; ‘medium’:; ‘high’:>150uV.  Persistence for periodic/rhythmic patterns (% of epoch) ‘rare’:<1%; ‘occasional’:1-10%; ‘frequent’:10-50%; ‘abundant’:50-90%; ‘continuous’:>90%.  Persistence for sporadic discharges: ‘rare’:<1/hr; ‘occasional’:1/min-1/hr; ‘frequent’:>1/min; ‘abundant’:>1/10 sec.  RPP=rhythmic and periodic patterns; GRDA=generalized rhythmic delta activity; FIRDA=frontal intermittent GRDA; LRDA=lateralized rhythmic delta activity; TIRDA=temporal intermittent rhythmic delta activity;  LPD=PLED=lateralized periodic discharges; GPD=generalized periodic discharges; BIPDs =bilateral independent periodic discharges; Mf=multifocal; SIRPDs=stimulus induced rhythmic, periodic, or ictal appearing discharges; BIRDs=brief potentially ictal rhythmic discharges >4 Hz, lasting .5-10s; PFA (paroxysmal bursts >13 Hz or =8 Hz <10s).  Modifiers: +F=with fast component; +S=with spike component; +R=with rhythmic component.  S-B=burst suppression pattern.  Max=maximal. N1-drowsy; N2-stage II sleep; N3-slow wave sleep. SSS/BETS=small sharp spikes/benign epileptiform transients of sleep. HV=hyperventilation; PS=photic stimulation]]]    Daily EEG Visual Analysis    FINDINGS:      Background:  The background is continuous and symmetric. The predominant background in the most wakeful state consists of diffuse polymorphic theta, alpha, and delta frequencies. There is no posterior dominant rhythm.     Background Slowing:  Generalized slowing: As above  Focal slowing: No clear focal slowing    State Changes:   Drowsiness is characterized by fragmentation, attenuation, and slowing of the background activity.  No clear stage 2 sleep architecture.    Interictal Findings:  Frequent left posterior quadrant (T7/P7, C3/P3, O1) spike/sharp waves, occasionally periodic at 0.5-1 Hz.  rare right central/posterocentral (C4/ P4, shifting) spike/sharp waves.  Occasional GPDs with triphasic morphology at 0.5 to 1 hz.   The above are less prevalent in a less wakeful state.    Electrographic and Electroclinical seizures:  None    Other Clinical Events:  None    Activation Procedures:   Hyperventilation is not performed.    Photic stimulation is not performed.    Artifacts:  Intermittent myogenic and movement artifacts are present.    EKG:  Single-lead EKG interpretaion limited by artifact.    EEG Classification / Summary:  Abnormal EEG in the awake, drowsy states.   -Frequent left posterior quadrant spike/sharp waves, occasionally periodic at 0.5-1 Hz  -Rare right central/posterocentral spike/sharp waves  -Occasional GPDs with triphasic waves.       -Moderate diffuse slowing  -No electrographic seizures    Clinical Impression:  -Risk of focal-onset seizures from left posterior quadrant and right central region.   -Moderate diffuse cerebral dysfunction is nonspecific in etiology, with some metabolic component.    -No seizures    This is fellow preliminary read, pending attending review.  -------------------------------------------------------------------------------------------------------  [facility] EEG Reading Room Ph#: [phone]  Epilepsy Answering Service after 5PM and before 8:30AM: Ph#: [phone]    EFE Burns  Epilepsy Fellow     MILANA BALL [id number]     Study Date: 10/6/23 08:00 - 10/7/23 08:00  Duration: 23 hr 58 min  --------------------------------------------------------------------------------------------------  History:  CC/ HPI Patient is a 73y old  Female who presents with a chief complaint of Respiratory distress (05 Oct 2023 22:22)  --------------------------------------------------------------------------------------------------  Study Interpretation:    [[[Abbreviation Key:  PDR=alpha rhythm/posterior dominant rhythm. A-P=anterior posterior.  Amplitude: ‘very low’:<20; ‘low’:20-49; ‘medium’:; ‘high’:>150uV.  Persistence for periodic/rhythmic patterns (% of epoch) ‘rare’:<1%; ‘occasional’:1-10%; ‘frequent’:10-50%; ‘abundant’:50-90%; ‘continuous’:>90%.  Persistence for sporadic discharges: ‘rare’:<1/hr; ‘occasional’:1/min-1/hr; ‘frequent’:>1/min; ‘abundant’:>1/10 sec.  RPP=rhythmic and periodic patterns; GRDA=generalized rhythmic delta activity; FIRDA=frontal intermittent GRDA; LRDA=lateralized rhythmic delta activity; TIRDA=temporal intermittent rhythmic delta activity;  LPD=PLED=lateralized periodic discharges; GPD=generalized periodic discharges; BIPDs =bilateral independent periodic discharges; Mf=multifocal; SIRPDs=stimulus induced rhythmic, periodic, or ictal appearing discharges; BIRDs=brief potentially ictal rhythmic discharges >4 Hz, lasting .5-10s; PFA (paroxysmal bursts >13 Hz or =8 Hz <10s).  Modifiers: +F=with fast component; +S=with spike component; +R=with rhythmic component.  S-B=burst suppression pattern.  Max=maximal. N1-drowsy; N2-stage II sleep; N3-slow wave sleep. SSS/BETS=small sharp spikes/benign epileptiform transients of sleep. HV=hyperventilation; PS=photic stimulation]]]    Daily EEG Visual Analysis    FINDINGS:      Background:  The background is continuous and symmetric. The predominant background in the most wakeful state consists of diffuse polymorphic theta, alpha, and delta frequencies. There is no posterior dominant rhythm.     Background Slowing:  Generalized slowing: As above  Focal slowing: No clear focal slowing    State Changes:   Drowsiness is characterized by fragmentation, attenuation, and slowing of the background activity.  No clear stage 2 sleep architecture.    Interictal Findings:  Frequent left posterior quadrant (T7/P7, C3/P3, O1) spike/sharp waves, occasionally periodic at 0.5-1 Hz.  rare right central/posterocentral (C4/ P4, shifting) spike/sharp waves.  Occasional GPDs with triphasic morphology at 0.5 to 1 hz.   The above are less prevalent in a less wakeful state.    Electrographic and Electroclinical seizures:  None    Other Clinical Events:  None    Activation Procedures:   Hyperventilation is not performed.    Photic stimulation is not performed.    Artifacts:  Intermittent myogenic and movement artifacts are present.    EKG:  Single-lead EKG interpretaion limited by artifact.    EEG Classification / Summary:  Abnormal EEG in the awake, drowsy states.   -Frequent left posterior quadrant spike/sharp waves, occasionally periodic at 0.5-1 Hz  -Rare right central/posterocentral spike/sharp waves  -Occasional GPDs with morphology waves.       -Moderate diffuse slowing  -No electrographic seizures    Clinical Impression:  -Risk of focal-onset seizures from left posterior quadrant and right central region.   -Moderate diffuse cerebral dysfunction is nonspecific in etiology, with some metabolic component.    -No seizures    This is fellow preliminary read, pending attending review.  -------------------------------------------------------------------------------------------------------  [facility] EEG Reading Room Ph#: [phone]  Epilepsy Answering Service after 5PM and before 8:30AM: Ph#: [phone]    EFE Burns  Epilepsy Fellow     MILANA BALL [id number]     Study Date: 10/6/23 08:00 - 10/7/23 08:00  Duration: 23 hr 58 min  --------------------------------------------------------------------------------------------------  History:  CC/ HPI Patient is a 73y old  Female who presents with a chief complaint of Respiratory distress (05 Oct 2023 22:22)  --------------------------------------------------------------------------------------------------  Study Interpretation:    [[[Abbreviation Key:  PDR=alpha rhythm/posterior dominant rhythm. A-P=anterior posterior.  Amplitude: ‘very low’:<20; ‘low’:20-49; ‘medium’:; ‘high’:>150uV.  Persistence for periodic/rhythmic patterns (% of epoch) ‘rare’:<1%; ‘occasional’:1-10%; ‘frequent’:10-50%; ‘abundant’:50-90%; ‘continuous’:>90%.  Persistence for sporadic discharges: ‘rare’:<1/hr; ‘occasional’:1/min-1/hr; ‘frequent’:>1/min; ‘abundant’:>1/10 sec.  RPP=rhythmic and periodic patterns; GRDA=generalized rhythmic delta activity; FIRDA=frontal intermittent GRDA; LRDA=lateralized rhythmic delta activity; TIRDA=temporal intermittent rhythmic delta activity;  LPD=PLED=lateralized periodic discharges; GPD=generalized periodic discharges; BIPDs =bilateral independent periodic discharges; Mf=multifocal; SIRPDs=stimulus induced rhythmic, periodic, or ictal appearing discharges; BIRDs=brief potentially ictal rhythmic discharges >4 Hz, lasting .5-10s; PFA (paroxysmal bursts >13 Hz or =8 Hz <10s).  Modifiers: +F=with fast component; +S=with spike component; +R=with rhythmic component.  S-B=burst suppression pattern.  Max=maximal. N1-drowsy; N2-stage II sleep; N3-slow wave sleep. SSS/BETS=small sharp spikes/benign epileptiform transients of sleep. HV=hyperventilation; PS=photic stimulation]]]    Daily EEG Visual Analysis    FINDINGS:      Background:  The background is continuous and symmetric. The predominant background in the most wakeful state consists of diffuse polymorphic theta, alpha, and delta frequencies. There is no posterior dominant rhythm.     Background Slowing:  Generalized slowing: As above  Focal slowing: No clear focal slowing    State Changes:   Drowsiness is characterized by fragmentation, attenuation, and slowing of the background activity.  No clear stage 2 sleep architecture.    Interictal Findings:  Frequent left posterior quadrant (T7/P7, C3/P3, O1) spike/sharp waves, occasionally periodic at 0.5-1 Hz.  rare right central/posterocentral (C4/ P4, shifting) spike/sharp waves.  Occasional GPDs with triphasic morphology at 0.5 to 1 hz.   The above are less prevalent in a less wakeful state.    Electrographic and Electroclinical seizures:  None    Other Clinical Events:  None    Activation Procedures:   Hyperventilation is not performed.    Photic stimulation is not performed.    Artifacts:  Intermittent myogenic and movement artifacts are present.    EKG:  Single-lead EKG interpretaion limited by artifact.    EEG Classification / Summary:  Abnormal EEG in the awake, drowsy states.   -Frequent left posterior quadrant spike/sharp waves, occasionally periodic at 0.5-1 Hz  -Rare right central/posterocentral spike/sharp waves  -Occasional GPDs with morphology waves.       -Moderate diffuse slowing  -No electrographic seizures    Clinical Impression:  -Risk of focal-onset seizures from left posterior quadrant and right central region.   -Moderate diffuse cerebral dysfunction is nonspecific in etiology, with some metabolic component.    -No seizures      -------------------------------------------------------------------------------------------------------  [facility] EEG Reading Room Ph#: [phone]  Epilepsy Answering Service after 5PM and before 8:30AM: Ph#: [phone]    EFE Burns  Epilepsy Fellow     MILANA BALL [id number]     Study Date: 10/6/23 08:00 - 10/7/23 08:00  Duration: 23 hr 58 min  --------------------------------------------------------------------------------------------------  History:  CC/ HPI Patient is a 73y old  Female who presents with a chief complaint of Respiratory distress (05 Oct 2023 22:22)  --------------------------------------------------------------------------------------------------  Study Interpretation:    [[[Abbreviation Key:  PDR=alpha rhythm/posterior dominant rhythm. A-P=anterior posterior.  Amplitude: ‘very low’:<20; ‘low’:20-49; ‘medium’:; ‘high’:>150uV.  Persistence for periodic/rhythmic patterns (% of epoch) ‘rare’:<1%; ‘occasional’:1-10%; ‘frequent’:10-50%; ‘abundant’:50-90%; ‘continuous’:>90%.  Persistence for sporadic discharges: ‘rare’:<1/hr; ‘occasional’:1/min-1/hr; ‘frequent’:>1/min; ‘abundant’:>1/10 sec.  RPP=rhythmic and periodic patterns; GRDA=generalized rhythmic delta activity; FIRDA=frontal intermittent GRDA; LRDA=lateralized rhythmic delta activity; TIRDA=temporal intermittent rhythmic delta activity;  LPD=PLED=lateralized periodic discharges; GPD=generalized periodic discharges; BIPDs =bilateral independent periodic discharges; Mf=multifocal; SIRPDs=stimulus induced rhythmic, periodic, or ictal appearing discharges; BIRDs=brief potentially ictal rhythmic discharges >4 Hz, lasting .5-10s; PFA (paroxysmal bursts >13 Hz or =8 Hz <10s).  Modifiers: +F=with fast component; +S=with spike component; +R=with rhythmic component.  S-B=burst suppression pattern.  Max=maximal. N1-drowsy; N2-stage II sleep; N3-slow wave sleep. SSS/BETS=small sharp spikes/benign epileptiform transients of sleep. HV=hyperventilation; PS=photic stimulation]]]    Daily EEG Visual Analysis    FINDINGS:      Background:  The background is continuous and symmetric. The predominant background in the most wakeful state consists of diffuse polymorphic theta, alpha, and delta frequencies. There is no posterior dominant rhythm.     Background Slowing:  Generalized slowing: As above  Focal slowing: No clear focal slowing    State Changes:   Drowsiness is characterized by fragmentation, attenuation, and slowing of the background activity.  No clear stage 2 sleep architecture.    Interictal Findings:  Frequent left posterior quadrant (T7/P7, C3/P3, O1) spike/sharp waves, occasionally periodic at 0.5-1 Hz.  rare right central/posterocentral (C4/ P4, shifting) spike/sharp waves.  Occasional GPDs with triphasic morphology at 0.5 to 1 hz.   The above are less prevalent in a less wakeful state.    Electrographic and Electroclinical seizures:  None    Other Clinical Events:  None    Activation Procedures:   Hyperventilation is not performed.    Photic stimulation is not performed.    Artifacts:  Intermittent myogenic and movement artifacts are present.    EKG:  Single-lead EKG interpretaion limited by artifact.    EEG Classification / Summary:  Abnormal EEG in the awake, drowsy states.   -Frequent left posterior quadrant spike/sharp waves, occasionally periodic at 0.5-1 Hz  -Rare right central/posterocentral spike/sharp waves  -Occasional GPDs with triphasic morphology waves.       -Moderate diffuse slowing  -No electrographic seizures    Clinical Impression:  -Risk of focal-onset seizures from left posterior quadrant and right central region.   -Moderate diffuse cerebral dysfunction is nonspecific in etiology, with some metabolic component.    -No seizures      -------------------------------------------------------------------------------------------------------  [facility] EEG Reading Room Ph#: [phone]  Epilepsy Answering Service after 5PM and before 8:30AM: Ph#: [phone]    EFE Burns  Epilepsy Fellow

## 2023-10-08 NOTE — PROGRESS NOTE ADULT - ASSESSMENT
74 y/o F well known to me from my [facility] outpt practice. she was admitted at [facility] 7/12-7/22 w aspiration PNA, was treated w CEFEPIME, developed an allergic rash,  dCHF, + MAC on AFB culture, had been progressively getting more and more lethargic and dyspneic at home since DC.   In  am of 8/11/23  ptn presented with respiratory distress w hypoxia and hypercarbia requiring intubation 2/2 volume overload +/- Asp PNA      Neuro   responds appropriately   Baseline MS AOx3, aphasic   - h/o CVA , on aspirin and statin . resumed w feeding tube, ASA resumed 8/26  - eeg  2/2 tremors, no sz focus  - less responsive in the past few days  - MRI 8/17:  new R cerebellar infarct, old left PCA/Occipital infarct. 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 Mcnulty, now in MICU  s/p septic shock overnight 9/1, now on meropenem, HDS  s/p trache, on vent support, copious secretions      Renal     Ptn well know to Dr. Colon,following   HD 8/19,  8/21, 8/26 and 8/28.  s/p PUF 8/29 2.5 Liters, HD 8/30,  9/1, 9/4  L IJ HD placed  uremic  hyponatremic  HD as per renal        Hypotension  - Levo drip  prognosis is poor  consider palliative care consult    GI  NPO  on tube feeds  coffee ground emesis x 1 8/24, melena overnight 8/31, s/p 1UPRBC, EGD/Colonoscopy: erosive gastropathy, esophagisits, on colonoscopy old blood seen no active bleeding, poor prep  family to decide re PEG placement    Endocrine  T2DM   A1C 7.1 in 7/2023   - BG goal 140-200     ID   No fever/leukocytosis, recheck temp   Hx latent TB which was treated, no concern for TB   - grew MAC on AFB culture from most recent admission. at [facility]  -MSSA Bacteremia 9/1, allergic to cephalosprins and PCN, on Vanco as per ID, renal dosing,   - sputum also grew E.Coli-ESBL, as per ID recs for  Bradford through 9/7( 1 week course as per ID) , afebrile.  - 9/8:  spike  temp 38.1C, has O. externa, has a wick, recs are to get a CT to R/O an abscess/bony involvement. cont Meropenem  9/9: ptn w fever overnight to 100.8,  may need shiley pulled if bacteremic, needs NECK CT as per ENT to R/O Mastoid bone involvement while having ongoing purulent DC from L ear 2/2 O. externa, getting daily wick changes  9/10:  spiked 102 overnight through Bradford and Vanco, purulent DC from O. externa, ENT recommend Ct of mastoid. ptn in HD, has Shiley in place, consider pulling shiley and send for Cx. would d/w Renal, and consider contacting  ID, last seen by Dr. Conner 9/6 9/11: remains febrile, Dr. Conner reconsulted. cont Bradford, Vanco  9/12: tmax 100.5, fever curve is improving, awaiting Left ear CT, on Bradford since 9/1. on  vanco for MSSA Bacteremia, does not tolerate cephalosporins. through 10/2  9/13: on full vent support via trache, appears uncomfortable and onur 2/2 Left O. externa. has an incidental left middle ear tumor, no acute middle ear interventions recommended, left ear wick replaced. on Meropenem, cx from Ear DC is neg. On Vanco for MSSA bacteremia through 10/2, course of Meropenem as per ID, afebrile in the past 24 hrs . Cardura for HTN resumed, HGB dropped to 6.4, got a dose of EPO and 1UPRBC, rpt 7.8, remains on HEPARIN drip for LEFT popliteal DVT  9/14: cont on Mupirocin locally to Left ear, cont IV Bradford, ENT signed off, afebrile x 48 hrs, Mro course to be determined by ID, on Vanco for MSSA bacteremia through 10/2. ongoing worsening hyponatremia, Hypertonic saline as per renal, no HD today, s/p 1UPRBC 9/13  9/15: spiking temps again, if cont to spike as per ID re PAN scan. cont Vanco for MSSA Bacteremia  9/16-18: no temp overnight  afebrile, cont to have Left ear DC,   on Vanco and ,bradford through 10/2  On IV Fluconazole for fungal cx+ from O. externa Discharge.   fluconazole started 9/21, ptn developed an allergic rash on 9/22. doubt its 2/2 to MEROPENEM or Vanco.  MEropenem was DCed 2/2 causing possible drug rash.  on VANCO based on levels for MSSA bacteremia but DCed 2/2 poss drug rash, now on Daptomycin  9/29: ptn is septic, meropenem resumed, s/p HD 9/28, next HD tomorrow    Heme/Onc  ppx: place on SCD  Transfuse for hgb 6.4, no obv bleed. HDS  LEFT POPLITEAL VEIN ACUTE DVT on 9/10, on Heparin drip    Ethics  GOC - Discussed GOC with daughter and , they have opted in the past for full code. and she remains full code at present      9/27:  In Mic[facility], R IJ HD catheter placed, NGT in place, acidotic on VBG, trache to vent, anasarca, on IV BUMEX, on Levo, on Heparin drip, on stress dose HYdrocortisone, FS in range, uremic, awaiting HD, CT C/A/P w no acute findings. VANCO Dced , now on Dapto, for MSSA baceteremia through 10/2    9/28: HD started 9/27, still encephalopathic, fluconazole DCed as it could be the cause of the rash. on Dapto to complete a course of Abx for MSSA bacteremia through 10/2, VANCO DCed 2/2 possible rash. off pressors    9/30: sepsis resolved, off fluconazole, would add FLUCONAZOLE to the allergy list. on Meropenem, on Dapto through 10/2 instead of Vanco.Vanco was DCed as preseumed ptn had an allergic rash to Vanco. she is not allergic to Vanco. vent dependent. tolerating HD. s/p HD today, next on 10/3    10/1: back in RCU, trach to vent,  tolerating HD well, on Bradford, Dapto, off fluconazole 2/2 allergic rxn    10/2:  trache to vent, completed a course of ABx for MSSA bacteremia TODAY, sputum grew P. aeruginosa resistant to MEROPENEM, ptn was switched to ZOSYN. 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 copious secretions. temp last night. ptn got 2 gm dose of AZACTAM today at 6 pm, ptn seen at 8 pm, has facial swelling and erythema, no stridor, ID made aware. Daughter and  at bedside state the allergic rash post ZOsyn had improved after DC zosyn. last dose of ZOsyn today at 2 pm, prior to that 11 am.   may need to switch to Amikacin if reaction will cont on Azactam. cont Benadryl. rpt Head ct tonight to r/o new CVA. HD as per renal    10/4: HD today, as per renal. no fever overnight, remains on AZTREONAM, rash resolved, next dose tonight    10/5: On aztreonam, afebrile, facial erythema is mild, prob resolving from ZOSYN exposure. on HD as per renal. trache to vent. O. externa resolved. 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 aeruginosa w Interm sens to Aztreonam, now DCed, started on Polymyxin, additional sensitivities being tested as per ID    10/7:  ptn cont to spike temps, FS are above 250, on polymyxin B for P. aeruginosa in sputum, trache to vent, has thick secretions. off eliquis in preparation to convert shiley to permacath in IR on 10/10  10/8: fever ongoing, no further ABx change recommended, ID recs: bronch. is this fever 2/2 worsening BRYNA ???

## 2023-10-08 NOTE — EEG REPORT - NS EEG TEXT BOX
MILANA BALL [id number]     Study Date: 10/7/23 08:00 - 10/8/23 08:00  Duration: 24 hr  --------------------------------------------------------------------------------------------------  History:  CC/ HPI Patient is a 73y old  Female who presents with a chief complaint of Respiratory distress (05 Oct 2023 22:22)  --------------------------------------------------------------------------------------------------  Study Interpretation:    [[[Abbreviation Key:  PDR=alpha rhythm/posterior dominant rhythm. A-P=anterior posterior.  Amplitude: ‘very low’:<20; ‘low’:20-49; ‘medium’:; ‘high’:>150uV.  Persistence for periodic/rhythmic patterns (% of epoch) ‘rare’:<1%; ‘occasional’:1-10%; ‘frequent’:10-50%; ‘abundant’:50-90%; ‘continuous’:>90%.  Persistence for sporadic discharges: ‘rare’:<1/hr; ‘occasional’:1/min-1/hr; ‘frequent’:>1/min; ‘abundant’:>1/10 sec.  RPP=rhythmic and periodic patterns; GRDA=generalized rhythmic delta activity; FIRDA=frontal intermittent GRDA; LRDA=lateralized rhythmic delta activity; TIRDA=temporal intermittent rhythmic delta activity;  LPD=PLED=lateralized periodic discharges; GPD=generalized periodic discharges; BIPDs =bilateral independent periodic discharges; Mf=multifocal; SIRPDs=stimulus induced rhythmic, periodic, or ictal appearing discharges; BIRDs=brief potentially ictal rhythmic discharges >4 Hz, lasting .5-10s; PFA (paroxysmal bursts >13 Hz or =8 Hz <10s).  Modifiers: +F=with fast component; +S=with spike component; +R=with rhythmic component.  S-B=burst suppression pattern.  Max=maximal. N1-drowsy; N2-stage II sleep; N3-slow wave sleep. SSS/BETS=small sharp spikes/benign epileptiform transients of sleep. HV=hyperventilation; PS=photic stimulation]]]    Daily EEG Visual Analysis    FINDINGS:      Background:  The background is continuous and symmetric. The predominant background in the most wakeful state consists of diffuse polymorphic theta, alpha, and delta frequencies. There is no posterior dominant rhythm.     Background Slowing:  Generalized slowing: As above  Focal slowing: No clear focal slowing    State Changes:   Drowsiness is characterized by fragmentation, attenuation, and slowing of the background activity.  No clear stage 2 sleep architecture.    Interictal Findings:  Frequent left posterior quadrant (T7/P7, C3/P3, O1) spike/sharp waves, occasionally periodic at 0.5-1 Hz.  rare right central/posterocentral (C4/ P4, shifting) spike/sharp waves.  Occasional GPDs with triphasic morphology at 0.5 to 1 hz.     Electrographic and Electroclinical seizures:  None    Other Clinical Events:  None    Activation Procedures:   Hyperventilation is not performed.    Photic stimulation is not performed.    Artifacts:  Intermittent myogenic and movement artifacts are present.    EKG:  Single-lead EKG interpretation limited by artifact.    EEG Classification / Summary:  Abnormal EEG in a lethargic state.   -Frequent left posterior quadrant spike/sharp waves, occasionally periodic at 0.5-1 Hz  -Rare right central/posterocentral spike/sharp waves  -Occasional GPDs with triphasic morphology waves.       -Moderate diffuse slowing  -No electrographic seizures    Clinical Impression:  -Risk of focal-onset seizures from left posterior quadrant and right central region.   -Moderate diffuse cerebral dysfunction is nonspecific in etiology, with some metabolic component.    -No seizures    This is fellow preliminary read, pending attending review.  -------------------------------------------------------------------------------------------------------  [facility] EEG Reading Room Ph#: [phone]  Epilepsy Answering Service after 5PM and before 8:30AM: Ph#: [phone]    EFE Burns  Epilepsy Fellow     MILANA BALL [id number]     Study Date: 10/7/23 08:00 - 10/8/23 1435  Duration: 30.5 hr  --------------------------------------------------------------------------------------------------  History:  CC/ HPI Patient is a 73y old  Female who presents with a chief complaint of Respiratory distress (05 Oct 2023 22:22)  --------------------------------------------------------------------------------------------------  Study Interpretation:    [[[Abbreviation Key:  PDR=alpha rhythm/posterior dominant rhythm. A-P=anterior posterior.  Amplitude: ‘very low’:<20; ‘low’:20-49; ‘medium’:; ‘high’:>150uV.  Persistence for periodic/rhythmic patterns (% of epoch) ‘rare’:<1%; ‘occasional’:1-10%; ‘frequent’:10-50%; ‘abundant’:50-90%; ‘continuous’:>90%.  Persistence for sporadic discharges: ‘rare’:<1/hr; ‘occasional’:1/min-1/hr; ‘frequent’:>1/min; ‘abundant’:>1/10 sec.  RPP=rhythmic and periodic patterns; GRDA=generalized rhythmic delta activity; FIRDA=frontal intermittent GRDA; LRDA=lateralized rhythmic delta activity; TIRDA=temporal intermittent rhythmic delta activity;  LPD=PLED=lateralized periodic discharges; GPD=generalized periodic discharges; BIPDs =bilateral independent periodic discharges; Mf=multifocal; SIRPDs=stimulus induced rhythmic, periodic, or ictal appearing discharges; BIRDs=brief potentially ictal rhythmic discharges >4 Hz, lasting .5-10s; PFA (paroxysmal bursts >13 Hz or =8 Hz <10s).  Modifiers: +F=with fast component; +S=with spike component; +R=with rhythmic component.  S-B=burst suppression pattern.  Max=maximal. N1-drowsy; N2-stage II sleep; N3-slow wave sleep. SSS/BETS=small sharp spikes/benign epileptiform transients of sleep. HV=hyperventilation; PS=photic stimulation]]]    Daily EEG Visual Analysis    FINDINGS:      Background:  The background is continuous and symmetric. The predominant background in the most wakeful state consists of diffuse polymorphic theta, alpha, and delta frequencies. There is no posterior dominant rhythm.     Background Slowing:  Generalized slowing: As above  Focal slowing: No clear focal slowing    State Changes:   Drowsiness is characterized by fragmentation, attenuation, and slowing of the background activity.  No clear stage 2 sleep architecture.    Interictal Findings:  Frequent left posterior quadrant (T7/P7, C3/P3, O1) spike/sharp waves, occasionally periodic at 0.5-1 Hz.  rare right central/posterocentral (C4/ P4, shifting) spike/sharp waves.  Occasional GPDs with triphasic morphology at 0.5 to 1 hz.     Electrographic and Electroclinical seizures:  None    Other Clinical Events:  None    Activation Procedures:   Hyperventilation is not performed.    Photic stimulation is not performed.    Artifacts:  Intermittent myogenic and movement artifacts are present.    EKG:  Single-lead EKG interpretation limited by artifact.    EEG Classification / Summary:  Abnormal EEG in a lethargic state.   -Frequent left posterior quadrant spike/sharp waves, occasionally periodic at 0.5-1 Hz  -Rare right central/posterocentral spike/sharp waves  -Occasional GPDs with triphasic morphology waves.       -Moderate diffuse slowing  -No electrographic seizures    Clinical Impression:  -Risk of focal-onset seizures from left posterior quadrant and right central region.   -Moderate diffuse cerebral dysfunction is nonspecific in etiology, with some metabolic component.    -No seizures    This is fellow preliminary read, pending attending review.  -------------------------------------------------------------------------------------------------------  [facility] EEG Reading Room Ph#: [phone]  Epilepsy Answering Service after 5PM and before 8:30AM: Ph#: [phone]    EFE Burns  Epilepsy Fellow     MILANA BALL [id number]     Study Date: 10/7/23 08:00 - 10/8/23 1435  Duration: 30.5 hr  --------------------------------------------------------------------------------------------------  History:  CC/ HPI Patient is a 73y old  Female who presents with a chief complaint of Respiratory distress (05 Oct 2023 22:22)  --------------------------------------------------------------------------------------------------  Study Interpretation:    [[[Abbreviation Key:  PDR=alpha rhythm/posterior dominant rhythm. A-P=anterior posterior.  Amplitude: ‘very low’:<20; ‘low’:20-49; ‘medium’:; ‘high’:>150uV.  Persistence for periodic/rhythmic patterns (% of epoch) ‘rare’:<1%; ‘occasional’:1-10%; ‘frequent’:10-50%; ‘abundant’:50-90%; ‘continuous’:>90%.  Persistence for sporadic discharges: ‘rare’:<1/hr; ‘occasional’:1/min-1/hr; ‘frequent’:>1/min; ‘abundant’:>1/10 sec.  RPP=rhythmic and periodic patterns; GRDA=generalized rhythmic delta activity; FIRDA=frontal intermittent GRDA; LRDA=lateralized rhythmic delta activity; TIRDA=temporal intermittent rhythmic delta activity;  LPD=PLED=lateralized periodic discharges; GPD=generalized periodic discharges; BIPDs =bilateral independent periodic discharges; Mf=multifocal; SIRPDs=stimulus induced rhythmic, periodic, or ictal appearing discharges; BIRDs=brief potentially ictal rhythmic discharges >4 Hz, lasting .5-10s; PFA (paroxysmal bursts >13 Hz or =8 Hz <10s).  Modifiers: +F=with fast component; +S=with spike component; +R=with rhythmic component.  S-B=burst suppression pattern.  Max=maximal. N1-drowsy; N2-stage II sleep; N3-slow wave sleep. SSS/BETS=small sharp spikes/benign epileptiform transients of sleep. HV=hyperventilation; PS=photic stimulation]]]    Daily EEG Visual Analysis    FINDINGS:      Background:  The background is continuous and symmetric. The predominant background in the most wakeful state consists of diffuse polymorphic theta, alpha, and delta frequencies. There is no posterior dominant rhythm.     Background Slowing:  Generalized slowing: As above  Focal slowing: No clear focal slowing    State Changes:   Drowsiness is characterized by fragmentation, attenuation, and slowing of the background activity.  No clear stage 2 sleep architecture.    Interictal Findings:  Frequent left posterior quadrant (T7/P7, C3/P3, O1) spike/sharp waves, occasionally periodic at 0.5-1 Hz.  rare right central/posterocentral (C4/ P4, shifting) spike/sharp waves.  Occasional GPDs with triphasic morphology at 0.5 to 1 hz.     Electrographic and Electroclinical seizures:  None    Other Clinical Events:  None    Activation Procedures:   Hyperventilation is not performed.    Photic stimulation is not performed.    Artifacts:  Intermittent myogenic and movement artifacts are present.    EKG:  Single-lead EKG interpretation limited by artifact.    EEG Classification / Summary:  Abnormal EEG in a lethargic state.   -Frequent left posterior quadrant spike/sharp waves, occasionally periodic at 0.5-1 Hz  -Rare right central/posterocentral spike/sharp waves  -Occasional GPDs with triphasic morphology waves.       -Moderate diffuse slowing  -No electrographic seizures    Clinical Impression:  -Risk of focal-onset seizures from left posterior quadrant and right central region.   -Moderate diffuse cerebral dysfunction is nonspecific in etiology, with some metabolic component.    -No seizures      -------------------------------------------------------------------------------------------------------  [facility] EEG Reading Room Ph#: [phone]  Epilepsy Answering Service after 5PM and before 8:30AM: Ph#: [phone]    EFE Burns  Epilepsy Fellow

## 2023-10-08 NOTE — PROGRESS NOTE ADULT - SUBJECTIVE AND OBJECTIVE BOX
CHIEF COMPLAINT: Patient is a 73y old  Female who presents with a chief complaint of Respiratory distress (07 Oct 2023 20:42)      INTERVAL EVENTS:  - Elevated temperature overnight TMAX 100.2F without true fever spike, however remains tachypneic and tachycardic.   - Given tachypnea PIPs worsened however overall with thick secretions and volume overloaded. Chest PT added and midodrine hold parameters held to aid in volume removal with HD tomorrow.   - FS rising and NPH increased to 5 with moderate ISS    REVIEW OF SYSTEMS: Seen by bedside during AM rounds and     Mode: AC/ CMV (Assist Control/ Continuous Mandatory Ventilation), RR (machine): 22, TV (machine): 350, FiO2: 35, PEEP: 8, ITime: 0.8, MAP: 19, PIP: 51      OBJECTIVE:  ICU Vital Signs Last 24 Hrs  T(C): 37.9 (08 Oct 2023 05:33), Max: 37.9 (08 Oct 2023 05:33)  T(F): 100.2 (08 Oct 2023 05:33), Max: 100.2 (08 Oct 2023 05:33)  HR: 103 (08 Oct 2023 07:02) (94 - 115)  BP: 101/42 (08 Oct 2023 05:33) (92/20 - 114/50)  BP(mean): 60 (08 Oct 2023 05:33) (45 - 66)  ABP: --  ABP(mean): --  RR: 22 (08 Oct 2023 05:33) (22 - 22)  SpO2: 99% (08 Oct 2023 07:02) (98% - 100%)    O2 Parameters below as of 08 Oct 2023 05:33  Patient On (Oxygen Delivery Method): ventilator, AC    O2 Concentration (%): 35      Mode: AC/ CMV (Assist Control/ Continuous Mandatory Ventilation), RR (machine): 22, TV (machine): 350, FiO2: 35, PEEP: 8, ITime: 0.8, MAP: 19, PIP: 51    10-07 @ 07:01  -  10-08 @ 07:00  --------------------------------------------------------  IN: 940 mL / OUT: 1400 mL / NET: -460 mL      CAPILLARY BLOOD GLUCOSE  POCT Blood Glucose.: 342 mg/dL (08 Oct 2023 06:24)      HOSPITAL MEDICATIONS:  MEDICATIONS  (STANDING):  albuterol    0.083% 2.5 milliGRAM(s) Nebulizer every 6 hours  artificial tears (preservative free) Ophthalmic Solution 1 Drop(s) Both EYES every 4 hours  aspirin  chewable 81 milliGRAM(s) Enteral Tube daily  atorvastatin 10 milliGRAM(s) Oral at bedtime  chlorhexidine 0.12% Liquid 15 milliLiter(s) Oral Mucosa every 12 hours  chlorhexidine 2% Cloths 1 Application(s) Topical daily  dextrose 5%. 1000 milliLiter(s) (50 mL/Hr) IV Continuous <Continuous>  dextrose 5%. 1000 milliLiter(s) (100 mL/Hr) IV Continuous <Continuous>  dextrose 50% Injectable 12.5 Gram(s) IV Push once  dextrose 50% Injectable 25 Gram(s) IV Push once  dextrose 50% Injectable 25 Gram(s) IV Push once  dextrose 50% Injectable 25 Gram(s) IV Push once  epoetin odalis (EPOGEN) Injectable 80724 Unit(s) IV Push <User Schedule>  ferrous    sulfate Liquid 300 milliGRAM(s) Oral daily  glucagon  Injectable 1 milliGRAM(s) IntraMuscular once  insulin lispro (ADMELOG) corrective regimen sliding scale   SubCutaneous every 6 hours  insulin NPH human recombinant 4 Unit(s) SubCutaneous every 6 hours  levETIRAcetam  Solution 1000 milliGRAM(s) Oral daily  levETIRAcetam  Solution 250 milliGRAM(s) Oral <User Schedule>  melatonin 3 milliGRAM(s) Oral at bedtime  midodrine. 30 milliGRAM(s) Oral every 8 hours  pantoprazole  Injectable 40 milliGRAM(s) IV Push two times a day  petrolatum Ophthalmic Ointment 1 Application(s) Both EYES two times a day  polymyxin B IVPB 031526 Unit(s) IV Intermittent every 12 hours  sodium chloride 1 Gram(s) Oral two times a day  sodium chloride 3%  Inhalation 4 milliLiter(s) Inhalation every 6 hours  valACYclovir 500 milliGRAM(s) Oral every 24 hours    MEDICATIONS  (PRN):  acetaminophen   Oral Liquid .. 650 milliGRAM(s) Oral every 6 hours PRN Temp greater or equal to 38C (100.4F), Mild Pain (1 - 3)  calamine/zinc oxide Lotion 1 Application(s) Topical two times a day PRN Rash and/or Itching  dextrose Oral Gel 15 Gram(s) Oral once PRN Blood Glucose LESS THAN 70 milliGRAM(s)/deciliter  sodium chloride 0.9% lock flush 10 milliLiter(s) IV Push every 1 hour PRN Pre/post blood products, medications, blood draw, and to maintain line patency      PHYSICAL EXAMINATION    LABS:                        8.2    16.42 )-----------( 388      ( 07 Oct 2023 21:40 )             26.2     10-07    135  |  98  |  35<H>  ----------------------------<  302<H>  4.0   |  27  |  1.48<H>    Ca    8.3<L>      07 Oct 2023 21:40  Phos  2.7     10-07  Mg     1.70     10-07        Urinalysis Basic - ( 07 Oct 2023 21:40 )  Color: x / Appearance: x / SG: x / pH: x  Gluc: 302 mg/dL / Ketone: x  / Bili: x / Urobili: x   Blood: x / Protein: x / Nitrite: x   Leuk Esterase: x / RBC: x / WBC x   Sq Epi: x / Non Sq Epi: x / Bacteria: x            PAST MEDICAL & SURGICAL HISTORY:  Breast CA      Diabetes      Stroke      Cardiac arrest      HTN (hypertension)      H/O mastectomy, bilateral          FAMILY HISTORY:  No pertinent family history in first degree relatives        Social History:      RADIOLOGY:  [ ] Reviewed and interpreted by me    PULMONARY FUNCTION TESTS:    EKG: CHIEF COMPLAINT: Patient is a 73y old  Female who presents with a chief complaint of Respiratory distress (07 Oct 2023 20:42)      INTERVAL EVENTS:  - Fevers 101.3F overnight with worsening leukocytosis. Polymyxin and Recario added to sensitivities for  PSA. Polymyxin only intermediate coverage and recario resistant. Will discuss with ID for further tx.   - Given tachypnea, PIPs worsened, however overall with thick secretions and volume overloaded. Chest PT added and midodrine hold parameters held to aid in volume removal with HD tomorrow.   - FS rising and NPH increased to 5 with moderate ISS    REVIEW OF SYSTEMS: Seen by bedside during AM rounds and     Mode: AC/ CMV (Assist Control/ Continuous Mandatory Ventilation), RR (machine): 22, TV (machine): 350, FiO2: 35, PEEP: 8, ITime: 0.8, MAP: 19, PIP: 51      OBJECTIVE:  ICU Vital Signs Last 24 Hrs  T(C): 37.9 (08 Oct 2023 05:33), Max: 37.9 (08 Oct 2023 05:33)  T(F): 100.2 (08 Oct 2023 05:33), Max: 100.2 (08 Oct 2023 05:33)  HR: 103 (08 Oct 2023 07:02) (94 - 115)  BP: 101/42 (08 Oct 2023 05:33) (92/20 - 114/50)  BP(mean): 60 (08 Oct 2023 05:33) (45 - 66)  ABP: --  ABP(mean): --  RR: 22 (08 Oct 2023 05:33) (22 - 22)  SpO2: 99% (08 Oct 2023 07:02) (98% - 100%)    O2 Parameters below as of 08 Oct 2023 05:33  Patient On (Oxygen Delivery Method): ventilator, AC    O2 Concentration (%): 35      Mode: AC/ CMV (Assist Control/ Continuous Mandatory Ventilation), RR (machine): 22, TV (machine): 350, FiO2: 35, PEEP: 8, ITime: 0.8, MAP: 19, PIP: 51    10-07 @ 07:01  -  10-08 @ 07:00  --------------------------------------------------------  IN: 940 mL / OUT: 1400 mL / NET: -460 mL      CAPILLARY BLOOD GLUCOSE  POCT Blood Glucose.: 342 mg/dL (08 Oct 2023 06:24)      HOSPITAL MEDICATIONS:  MEDICATIONS  (STANDING):  albuterol    0.083% 2.5 milliGRAM(s) Nebulizer every 6 hours  artificial tears (preservative free) Ophthalmic Solution 1 Drop(s) Both EYES every 4 hours  aspirin  chewable 81 milliGRAM(s) Enteral Tube daily  atorvastatin 10 milliGRAM(s) Oral at bedtime  chlorhexidine 0.12% Liquid 15 milliLiter(s) Oral Mucosa every 12 hours  chlorhexidine 2% Cloths 1 Application(s) Topical daily  dextrose 5%. 1000 milliLiter(s) (50 mL/Hr) IV Continuous <Continuous>  dextrose 5%. 1000 milliLiter(s) (100 mL/Hr) IV Continuous <Continuous>  dextrose 50% Injectable 12.5 Gram(s) IV Push once  dextrose 50% Injectable 25 Gram(s) IV Push once  dextrose 50% Injectable 25 Gram(s) IV Push once  dextrose 50% Injectable 25 Gram(s) IV Push once  epoetin odalis (EPOGEN) Injectable 83808 Unit(s) IV Push <User Schedule>  ferrous    sulfate Liquid 300 milliGRAM(s) Oral daily  glucagon  Injectable 1 milliGRAM(s) IntraMuscular once  insulin lispro (ADMELOG) corrective regimen sliding scale   SubCutaneous every 6 hours  insulin NPH human recombinant 4 Unit(s) SubCutaneous every 6 hours  levETIRAcetam  Solution 1000 milliGRAM(s) Oral daily  levETIRAcetam  Solution 250 milliGRAM(s) Oral <User Schedule>  melatonin 3 milliGRAM(s) Oral at bedtime  midodrine. 30 milliGRAM(s) Oral every 8 hours  pantoprazole  Injectable 40 milliGRAM(s) IV Push two times a day  petrolatum Ophthalmic Ointment 1 Application(s) Both EYES two times a day  polymyxin B IVPB 581436 Unit(s) IV Intermittent every 12 hours  sodium chloride 1 Gram(s) Oral two times a day  sodium chloride 3%  Inhalation 4 milliLiter(s) Inhalation every 6 hours  valACYclovir 500 milliGRAM(s) Oral every 24 hours    MEDICATIONS  (PRN):  acetaminophen   Oral Liquid .. 650 milliGRAM(s) Oral every 6 hours PRN Temp greater or equal to 38C (100.4F), Mild Pain (1 - 3)  calamine/zinc oxide Lotion 1 Application(s) Topical two times a day PRN Rash and/or Itching  dextrose Oral Gel 15 Gram(s) Oral once PRN Blood Glucose LESS THAN 70 milliGRAM(s)/deciliter  sodium chloride 0.9% lock flush 10 milliLiter(s) IV Push every 1 hour PRN Pre/post blood products, medications, blood draw, and to maintain line patency      PHYSICAL EXAMINATION    LABS:                        8.2    16.42 )-----------( 388      ( 07 Oct 2023 21:40 )             26.2     10-07    135  |  98  |  35<H>  ----------------------------<  302<H>  4.0   |  27  |  1.48<H>    Ca    8.3<L>      07 Oct 2023 21:40  Phos  2.7     10-07  Mg     1.70     10-07        Urinalysis Basic - ( 07 Oct 2023 21:40 )  Color: x / Appearance: x / SG: x / pH: x  Gluc: 302 mg/dL / Ketone: x  / Bili: x / Urobili: x   Blood: x / Protein: x / Nitrite: x   Leuk Esterase: x / RBC: x / WBC x   Sq Epi: x / Non Sq Epi: x / Bacteria: x            PAST MEDICAL & SURGICAL HISTORY:  Breast CA      Diabetes      Stroke      Cardiac arrest      HTN (hypertension)      H/O mastectomy, bilateral          FAMILY HISTORY:  No pertinent family history in first degree relatives        Social History:      RADIOLOGY:  [ ] Reviewed and interpreted by me    PULMONARY FUNCTION TESTS:    EKG: CHIEF COMPLAINT: Patient is a 73y old  Female who presents with a chief complaint of Respiratory distress (07 Oct 2023 20:42)      INTERVAL EVENTS:  - Fevers 101.3F overnight with worsening leukocytosis. Polymyxin and Recario added to sensitivities for  PSA. Polymyxin only intermediate coverage and recario resistant. Will discuss with ID for further tx.   - Given tachypnea, PIPs worsened, however overall with thick secretions and volume overloaded. Chest PT added and midodrine hold parameters removed to aid in volume removal with HD tomorrow.   - FS rising and NPH increased to 5 with moderate ISS    REVIEW OF SYSTEMS: Seen by bedside during AM rounds and unable to assess ROS given vented and poor mentation     Mode: AC/ CMV (Assist Control/ Continuous Mandatory Ventilation), RR (machine): 22, TV (machine): 350, FiO2: 35, PEEP: 8, ITime: 0.8, MAP: 19, PIP: 51      OBJECTIVE:  ICU Vital Signs Last 24 Hrs  T(C): 37.9 (08 Oct 2023 05:33), Max: 37.9 (08 Oct 2023 05:33)  T(F): 100.2 (08 Oct 2023 05:33), Max: 100.2 (08 Oct 2023 05:33)  HR: 103 (08 Oct 2023 07:02) (94 - 115)  BP: 101/42 (08 Oct 2023 05:33) (92/20 - 114/50)  BP(mean): 60 (08 Oct 2023 05:33) (45 - 66)  ABP: --  ABP(mean): --  RR: 22 (08 Oct 2023 05:33) (22 - 22)  SpO2: 99% (08 Oct 2023 07:02) (98% - 100%)    O2 Parameters below as of 08 Oct 2023 05:33  Patient On (Oxygen Delivery Method): ventilator, AC    O2 Concentration (%): 35      Mode: AC/ CMV (Assist Control/ Continuous Mandatory Ventilation), RR (machine): 22, TV (machine): 350, FiO2: 35, PEEP: 8, ITime: 0.8, MAP: 19, PIP: 51    10-07 @ 07:01  -  10-08 @ 07:00  --------------------------------------------------------  IN: 940 mL / OUT: 1400 mL / NET: -460 mL      CAPILLARY BLOOD GLUCOSE  POCT Blood Glucose.: 342 mg/dL (08 Oct 2023 06:24)      HOSPITAL MEDICATIONS:  MEDICATIONS  (STANDING):  albuterol    0.083% 2.5 milliGRAM(s) Nebulizer every 6 hours  artificial tears (preservative free) Ophthalmic Solution 1 Drop(s) Both EYES every 4 hours  aspirin  chewable 81 milliGRAM(s) Enteral Tube daily  atorvastatin 10 milliGRAM(s) Oral at bedtime  chlorhexidine 0.12% Liquid 15 milliLiter(s) Oral Mucosa every 12 hours  chlorhexidine 2% Cloths 1 Application(s) Topical daily  dextrose 5%. 1000 milliLiter(s) (50 mL/Hr) IV Continuous <Continuous>  dextrose 5%. 1000 milliLiter(s) (100 mL/Hr) IV Continuous <Continuous>  dextrose 50% Injectable 12.5 Gram(s) IV Push once  dextrose 50% Injectable 25 Gram(s) IV Push once  dextrose 50% Injectable 25 Gram(s) IV Push once  dextrose 50% Injectable 25 Gram(s) IV Push once  epoetin odalis (EPOGEN) Injectable 53372 Unit(s) IV Push <User Schedule>  ferrous    sulfate Liquid 300 milliGRAM(s) Oral daily  glucagon  Injectable 1 milliGRAM(s) IntraMuscular once  insulin lispro (ADMELOG) corrective regimen sliding scale   SubCutaneous every 6 hours  insulin NPH human recombinant 4 Unit(s) SubCutaneous every 6 hours  levETIRAcetam  Solution 1000 milliGRAM(s) Oral daily  levETIRAcetam  Solution 250 milliGRAM(s) Oral <User Schedule>  melatonin 3 milliGRAM(s) Oral at bedtime  midodrine. 30 milliGRAM(s) Oral every 8 hours  pantoprazole  Injectable 40 milliGRAM(s) IV Push two times a day  petrolatum Ophthalmic Ointment 1 Application(s) Both EYES two times a day  polymyxin B IVPB 237751 Unit(s) IV Intermittent every 12 hours  sodium chloride 1 Gram(s) Oral two times a day  sodium chloride 3%  Inhalation 4 milliLiter(s) Inhalation every 6 hours  valACYclovir 500 milliGRAM(s) Oral every 24 hours    MEDICATIONS  (PRN):  acetaminophen   Oral Liquid .. 650 milliGRAM(s) Oral every 6 hours PRN Temp greater or equal to 38C (100.4F), Mild Pain (1 - 3)  calamine/zinc oxide Lotion 1 Application(s) Topical two times a day PRN Rash and/or Itching  dextrose Oral Gel 15 Gram(s) Oral once PRN Blood Glucose LESS THAN 70 milliGRAM(s)/deciliter  sodium chloride 0.9% lock flush 10 milliLiter(s) IV Push every 1 hour PRN Pre/post blood products, medications, blood draw, and to maintain line patency      PHYSICAL EXAMINATION  General: NAD   HEENT: Trach present with NGT . Left ear and left eye appears improved.   Cards: S1/S2, no murmurs   Pulm: Course vent sounds bilaterally with crackles at bases and mild rhonchi cleared upon suctioning. No wheezes.   Abdomen: Soft, NTND. BS (+)   Extremities: No pedal edema. No active ROM x 4  Neurology: Eyes closed, does not follow commands, only grimaces to pain, with no focal neurological deficits     LABS:                        8.2    16.42 )-----------( 388      ( 07 Oct 2023 21:40 )             26.2     10-07    135  |  98  |  35<H>  ----------------------------<  302<H>  4.0   |  27  |  1.48<H>    Ca    8.3<L>      07 Oct 2023 21:40  Phos  2.7     10-07  Mg     1.70     10-07        Urinalysis Basic - ( 07 Oct 2023 21:40 )  Color: x / Appearance: x / SG: x / pH: x  Gluc: 302 mg/dL / Ketone: x  / Bili: x / Urobili: x   Blood: x / Protein: x / Nitrite: x   Leuk Esterase: x / RBC: x / WBC x   Sq Epi: x / Non Sq Epi: x / Bacteria: x            PAST MEDICAL & SURGICAL HISTORY:  Breast CA      Diabetes      Stroke      Cardiac arrest      HTN (hypertension)      H/O mastectomy, bilateral          FAMILY HISTORY:  No pertinent family history in first degree relatives        Social History:      RADIOLOGY:  [ ] Reviewed and interpreted by me    PULMONARY FUNCTION TESTS:    EKG:

## 2023-10-08 NOTE — PROGRESS NOTE ADULT - SUBJECTIVE AND OBJECTIVE BOX
Subjective: Patient seen and examined. No new events except as noted.     REVIEW OF SYSTEMS:    Unable to obtain     MEDICATIONS:  MEDICATIONS  (STANDING):  albuterol    0.083% 2.5 milliGRAM(s) Nebulizer every 6 hours  artificial tears (preservative free) Ophthalmic Solution 1 Drop(s) Both EYES every 4 hours  aspirin  chewable 81 milliGRAM(s) Enteral Tube daily  atorvastatin 10 milliGRAM(s) Oral at bedtime  chlorhexidine 0.12% Liquid 15 milliLiter(s) Oral Mucosa every 12 hours  chlorhexidine 2% Cloths 1 Application(s) Topical daily  dextrose 5%. 1000 milliLiter(s) (100 mL/Hr) IV Continuous <Continuous>  dextrose 5%. 1000 milliLiter(s) (50 mL/Hr) IV Continuous <Continuous>  dextrose 50% Injectable 12.5 Gram(s) IV Push once  dextrose 50% Injectable 25 Gram(s) IV Push once  dextrose 50% Injectable 25 Gram(s) IV Push once  dextrose 50% Injectable 25 Gram(s) IV Push once  epoetin odalis (EPOGEN) Injectable 67397 Unit(s) IV Push <User Schedule>  ferrous    sulfate Liquid 300 milliGRAM(s) Oral daily  glucagon  Injectable 1 milliGRAM(s) IntraMuscular once  insulin lispro (ADMELOG) corrective regimen sliding scale   SubCutaneous every 6 hours  insulin NPH human recombinant 4 Unit(s) SubCutaneous every 6 hours  levETIRAcetam  Solution 1000 milliGRAM(s) Oral daily  levETIRAcetam  Solution 250 milliGRAM(s) Oral <User Schedule>  melatonin 3 milliGRAM(s) Oral at bedtime  midodrine. 30 milliGRAM(s) Oral every 8 hours  pantoprazole  Injectable 40 milliGRAM(s) IV Push two times a day  petrolatum Ophthalmic Ointment 1 Application(s) Both EYES two times a day  polymyxin B IVPB 895174 Unit(s) IV Intermittent every 12 hours  sodium chloride 1 Gram(s) Oral two times a day  sodium chloride 3%  Inhalation 4 milliLiter(s) Inhalation every 6 hours      PHYSICAL EXAM:  T(C): 37 (10-08-23 @ 13:22), Max: 37.9 (10-08-23 @ 05:33)  HR: 101 (10-08-23 @ 15:41) (101 - 114)  BP: 90/35 (10-08-23 @ 13:22) (90/35 - 114/40)  RR: 22 (10-08-23 @ 13:22) (22 - 22)  SpO2: 100% (10-08-23 @ 15:41) (98% - 100%)  Wt(kg): --  I&O's Summary    07 Oct 2023 07:01  -  08 Oct 2023 07:00  --------------------------------------------------------  IN: 940 mL / OUT: 1400 mL / NET: -460 mL          Appearance: NAD, +trach  HEENT: dry oral mucosa  Lymphatic: No lymphadenopathy  Cardiovascular: Normal S1 S2, No JVD, No murmurs, No edema  Respiratory: Decreased BS, + trach to vent	  Neuro: opens eyes  Gastrointestinal: Soft, Non-tender, + BS, + NGT  Skin: No rashes, No ecchymoses, No cyanosis	  Extremities: No strength/ROM 2/2 sedation, + BL LE edema  Vascular: Peripheral pulses palpable 2+ bilaterally      LABS:    CARDIAC MARKERS:                                8.2    16.42 )-----------( 388      ( 07 Oct 2023 21:40 )             26.2     10-07    135  |  98  |  35<H>  ----------------------------<  302<H>  4.0   |  27  |  1.48<H>    Ca    8.3<L>      07 Oct 2023 21:40  Phos  2.7     10-07  Mg     1.70     10-07          TELEMETRY: 	    ECG:  	  RADIOLOGY:   DIAGNOSTIC TESTING:  [ ] Echocardiogram:  [ ]  Catheterization:  [ ] Stress Test:    OTHER: 	    TECH INFORMATION:   This is a Continuous Video EEG.     Recording Technique: The patient underwent continuous video-EEG monitoring, using Telemetry System hardware on the [employer].  EEG and video data were stored on a computer hard drive with important events saved in digital archive files. The material was reviewed by a physician (electroencephalographer/epileptologist) on a daily basis.  Juan Jose and seizure detection algorithms were utilized and reviewed.  An EEG Technician attended to the patient for 8 to 10 hours per day. The patient was observed by the epilepsy nursing staff 24 hours per day. The epilepsy center neurologist was available in person or on call 24 hours per day..     Placement and Labeling of Electrodes: The EEG was performed utilizing at least 20 channel referential EEG connections (coronal over temporal over parasagittal montage) with inferior temporal electrodes when indicting and using all standard 10-20 electrode placements with EKG, with additional electrodes placed in the inferior temporal region using the modified 10-10 montage electrode placements for elective admissions, or if deemed necessary.  Recording was at  a sampling rate of 256 samples per second per channel. Time synchronized digital video recording was done simultaneously with EEG recording.  A low light infrared camera was used for low light recording..    EEG REPORT:   EEG Report:  · EEG Report	  MILANA BALL MRN-[id number]     Study Date: 10/7/23 08:00 - 10/8/23 08:00  Duration: 24 hr  --------------------------------------------------------------------------------------------------  History:  CC/ HPI Patient is a 73y old  Female who presents with a chief complaint of Respiratory distress (05 Oct 2023 22:22)  --------------------------------------------------------------------------------------------------  Study Interpretation:    [[[Abbreviation Key:  PDR=alpha rhythm/posterior dominant rhythm. A-P=anterior posterior.  Amplitude: ‘very low’:<20; ‘low’:20-49; ‘medium’:; ‘high’:>150uV.  Persistence for periodic/rhythmic patterns (% of epoch) ‘rare’:<1%; ‘occasional’:1-10%; ‘frequent’:10-50%; ‘abundant’:50-90%; ‘continuous’:>90%.  Persistence for sporadic discharges: ‘rare’:<1/hr; ‘occasional’:1/min-1/hr; ‘frequent’:>1/min; ‘abundant’:>1/10 sec.  RPP=rhythmic and periodic patterns; GRDA=generalized rhythmic delta activity; FIRDA=frontal intermittent GRDA; LRDA=lateralized rhythmic delta activity; TIRDA=temporal intermittent rhythmic delta activity;  LPD=PLED=lateralized periodic discharges; GPD=generalized periodic discharges; BIPDs =bilateral independent periodic discharges; Mf=multifocal; SIRPDs=stimulus induced rhythmic, periodic, or ictal appearing discharges; BIRDs=brief potentially ictal rhythmic discharges >4 Hz, lasting .5-10s; PFA (paroxysmal bursts >13 Hz or =8 Hz <10s).  Modifiers: +F=with fast component; +S=with spike component; +R=with rhythmic component.  S-B=burst suppression pattern.  Max=maximal. N1-drowsy; N2-stage II sleep; N3-slow wave sleep. SSS/BETS=small sharp spikes/benign epileptiform transients of sleep. HV=hyperventilation; PS=photic stimulation]]]    Daily EEG Visual Analysis    FINDINGS:      Background:  The background is continuous and symmetric. The predominant background in the most wakeful state consists of diffuse polymorphic theta, alpha, and delta frequencies. There is no posterior dominant rhythm.     Background Slowing:  Generalized slowing: As above  Focal slowing: No clear focal slowing    State Changes:   Drowsiness is characterized by fragmentation, attenuation, and slowing of the background activity.  No clear stage 2 sleep architecture.    Interictal Findings:  Frequent left posterior quadrant (T7/P7, C3/P3, O1) spike/sharp waves, occasionally periodic at 0.5-1 Hz.  rare right central/posterocentral (C4/ P4, shifting) spike/sharp waves.  Occasional GPDs with triphasic morphology at 0.5 to 1 hz.     Electrographic and Electroclinical seizures:  None    Other Clinical Events:  None    Activation Procedures:   Hyperventilation is not performed.    Photic stimulation is not performed.    Artifacts:  Intermittent myogenic and movement artifacts are present.    EKG:  Single-lead EKG interpretation limited by artifact.    EEG Classification / Summary:  Abnormal EEG in a lethargic state.   -Frequent left posterior quadrant spike/sharp waves, occasionally periodic at 0.5-1 Hz  -Rare right central/posterocentral spike/sharp waves  -Occasional GPDs with triphasic morphology waves.       -Moderate diffuse slowing  -No electrographic seizures    Clinical Impression:  -Risk of focal-onset seizures from left posterior quadrant and right central region.   -Moderate diffuse cerebral dysfunction is nonspecific in etiology, with some metabolic component.    -No seizures    This is fellow preliminary read, pending attending review.  -------------------------------------------------------------------------------------------------------  [facility] EEG Reading Room Ph#: [phone]  Epilepsy Answering Service after 5PM and before 8:30AM: Ph#: [phone]    EFE Burns  Epilepsy Fellow            Electronic Signatures:  Iris Holcomb)  (Signed 08-Oct-2023 12:44)  	Authored: TECH INFORMATION, EEG REPORT  Sarah Pollock)  (Signature Pending)  	Co-Signer: TECH INFORMATION, EEG REPORT      Last Updated: 08-Oct-2023 12:44 by Iris Holcomb)

## 2023-10-08 NOTE — PROGRESS NOTE ADULT - NS ATTEND AMEND GEN_ALL_CORE FT
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 ESRD s/p ICU and intubation. Course further c/b GIB s/p EGD, new R cerebellar infarct, and pneumonia/bacteremia, and prolonged respiratory failure require tracheostomy.    - Remains minimally responsive. Resolved RUE focal twitching. EEG with possible focal seizure with RUE twitching and at risk of focal onset seizures from multiple locations, especially left posterior temporal/posterocentral region, likely due to encephalomalacia/gliosis from prior left parietal CVA  - on levetiracetam.  Repeat CT head on 10/3 with no acute changes. Ammonia normal. BUN improved. DC vEEG. appreciate neurology input  - HD per nephro, continue Epoetin per nephrology.   - sputum culture with numerous carbapenem-resistant Pseudomonas aeruginosa, now intermediate to aztreonam. However, she is allergic to PCNs and cephalosporins. Appreciate ID follow-up, on polymixin. Appreciate ENT follow-up for left ear otitis externa, which has now resolved. Follow-up ophthalmology regarding keratitis + chemosis, on Lacrilube and artificial tears.   - Continue midodrine 30 mg q8h. Continue aspirin and statin given h/o CVA. Will transition to 40mg pre-dialysis. possible droxidopa if persistent hypotension limiting HD  - Continue lung protective ventilation. Airway clearance therapy with Duonebs and hypertonic saline.  - Continue tube feeds as tolerates. PPI for GI ppx.  - On apixaban for DVT. Apixaban held since 10/7 for Permacath next week.  - Overall prognosis guarded, remains full code. Palliative care follow-up; discussed with family at bedside today regarding concern for resistant organisms and possible recurrent infections. Will monitor response to abx and reassess GOC

## 2023-10-08 NOTE — PROGRESS NOTE ADULT - ASSESSMENT
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 While in MICU patient noted with worsening renal failure requiring HD initiation, CGE/ Melena s/p EGD 8/31 found with esophagitis and erosive gastropathy, new right cerebellar infarct and fevers second to ESBL COLI and MSSA VAP, MSSA bacteremia, left ear otitis externa and drug rxn to cephalosporins. 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 Case discussed at length renal and given good UOP attempted renal recovery, however failed, and upon reinitiation of HD attempt. R IJ SHILEY failed. Attempted volume removal with Bumex GTT however course complicated by hypotension requiring transfer back to MICU 9/26 for pressor support. Diuresis dc'ed, pressors weaned off and stress steroids started. L IJ SHILEY placed (9/30) and HD reinstated. Course further complicated by AOCD and  PSA and Proteus VAP. Patient transferred back to RCU 10/1 with course complicated by volume overload and grossly resistant organisms.    NEUROLOGY  # NEW cerebella infarct   - Baseline MS AOX3 with short term memory changes per family however noted with concern for poor mentation in ICU  - MRI (8/17) with NEW right cerebellar infarct and old left PCA/occipital infarcts  - STEPHANIE (8/17) with AV showing two linear mobile echogenic elements attached to valve leaflets consistent with Lambel's excrescence, but no TRINITY thrombus, and lambel's excrescence with uncertain clinical implication.   - EEG (8/11-8/15) with no seizures   - ILR interrogation (9/7) with SR and PACs falsely recorded as AF.   - Continue on ASA and lipitor for medical management     # Seizures   - Course complicated by questionable head and body shaking with prolactin elevated 9/8. Discussed for spot EEG however held given low likelihood of seizures noted and acute respiratory illnesses   - Course further complicated with right arm twitching and RPT EEG (10/4) with no seizure however but noted with increase seizure potential in left posterior quadrants.   - RPT EEG (10/5) with possible focal seizures and risk of focal-onset seizures from multiple locations, most prominent in the left posterior temporal/posterocentral region  - RPT EEG (10/6) with RUE twitching are likely non-epileptic in nature, however risk of focal-onset seizures from multiple locations  - Continue on Keppra PPX   - EEG preliminary Clinical Impression:  -Risk of focal-onset seizures from left posterior quadrant and right central region.   -Moderate diffuse cerebral dysfunction is nonspecific in etiology, with some metabolic component.  -No seizures      # Metabolic vs Uremic Encephalopathy   - Lethargy noted with progression to stupor thought to be second to uremia.   - RPT CTH (9/26) with vague areas of hypoattenuation within the bilateral cerebellar hemispheres which may be compatible with acute/subacute ischemia.   - Discussed CTH with neurology and no signs of new infarct noted.   - HD reinstated in ICU with improvement in uremia however mentation remained   - Poor mentation likely in setting of infections.   - Monitor mentation closely     CARDIOVASCULAR  # HTN Urgency   # Septic vs vasoplegic shock   - Labile BPs ranging in between SBP 80s-200s and attempted labetalol, however noted with hypotension and bradycardia likely from BB toxicity vs shock state?    - Holding Labetalol, Catapres and Catapres.    - Attempted volume removal with bumex diuresis however noted with return of shock state requiring pressor support and transfer back to ICU.   - Pressors weaned off to stress dose (last day 10/4) and Midodrine   - Continue on Midodrine 30 TID (9/29 - ) and wean as tolerated however will likely need for adequate volume removal.   -Will raise parameter to hold midodrine to over 130. Midodrine held this am and follow up sbp in 80's    # Hx of HFpEF with likely ADHF with volume overload.   - ECHO (7/2023) with EF 59 with severe LVH and diastolic dysfunction   - STEPHANIE (8/18) with normal LVRVSF however noted with increased LA pressures and persistent LA septal bowing into RA.   - ECHO (8/11) with EF 60 with mild LVH, normal LVRVSF, and mild ddfxn.  - Grossly volume overloaded and removing volume with HD sessions   - Scheduled for UF 10/7    HEENT   # Left ear OE with acute on chronic left-sided otomastoiditis  - ENT evaluation (9/7) with no mastoid tenderness, however found with positive swelling and excoriation to left auricle and tenderness to manipulation of auricle. Debrided crusting of auricle and EAC evaluated with edema and unable to visualize TM. EAC debrided and found with watery exudate.   - CT IAC with acute on chronic left-sided otitis externa and acute on chronic left-sided otomastoiditis. Superimposed left-sided tympanosclerosis. Superimposed cholesteatoma formation within the left tympanomastoid cavity cannot be excluded  - Completed CiproHC (9/5 - 9/16), Bradford (9/1-10/1) and acetic acid and bacitracin.   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   - Continue preservative free artificial tears 4 times a day in the both eye  - Continue lacrilube ointment twice a day in the both eyes   - Continue on empiric valtrex 500mg BID (9/29 - ) per ophtho    # Oral Lesions with questionable Zoster vs Trauma?   - Patient with tongue pain and seen with anterior ulcerations consolidating and crusting and posterior ulceration.  - Case discussed with pathology resident and culture/ cytology sent.   - HSV negative and Path (9/6) with normal appearing epithelial cells singly and in clusters devoid of viral cytopathic effect.   - Continue on magic mouth wash for pain    RESPIRATORY  # AHHRF second to volume overload   - Hx of CKD and HFpEF presented with SOB and hypercarbia second to volume overload requiring intubation and HD initiation   - Vent weaning attempted however limited requiring tracheostomy (9/1) with IP   - Course complicated by PIPs elevated and found with tracheomalacia and volume overloaded.   - CT CHEST (9/8) with tracheomalacia, BL pleural effusions and continued consolidations   - Continue on vent and given TBM increased PEEP to 8 with improvement in dynamic collapse, but PIP waxes and wanes with volume overloaded and secretions. Continue on duonebs and HTS with volume removal with HD   - SBT when mentation improves.      GI  # UGIB   - CGE x 1 episode on 8/24 and melena x 2 episodes on 8/31 likely residual blood.   - EGD (8/31) found with esophagitis and erosive gastropathy  - Continue on PPI BID through late October and then PPI QD     # Dysphagia   - NGT-TF continued   - Pending PEG however needs improvement prior to placement.     RENAL  # Progressive CKD   - Presented volume overload and progressive RUSS on CKD   - POCUS with no signs of hydronephrosis in ICU  - Pressor support started with improvement in renal function in ICU  - Attempted steroid course in ICU without improvement in renal function   - Case discussed at length with renal and initiated on HD in ICU   - Remain on HD while in RCU however noted to be volume overloaded. Attempted Bumex pushes and patient noted with good UOP.   - Attempted renal recovery in RCU however noted with decent UOP, but BUN/ CRE continued to rise without improvement on diuresis.   - Ultimately resumed on HD (9/27, 9/28, 9/30, 10/2 and 10/4).   - Next HD session today however volume overloaded and plan for additional UF session tomorrow     # Hyponatremia   - Continue on salt tabs 1G  (decreased 10/6) and weaning off as tolerated with UF sessions  - Monitor sodium     # Hyperphosphatemia to Hypophosphatemia with HD  - PTH normal and elevated phos likely from renal failure  - Phos binder with Renvela started with improvement however then noted with hypophosphatemia and Renvela stopped.     INFECTIOUS DISEASE  # ESBL ECOLI and MSSA VAP c/b MSSA bacteremia   - RVP/ COVID (8/11) negative   - SCx (8/11) with MSSA s/p cefepime (8/12-8/13) and then ancef (8/14) however noted with drug reaction and then changed to vanco and aztreonam (8/12-8/13) in ICU .   - Course complicated by recurrent fevers and return of MSSA with bacteremia.   - SCx (9/1) with MSSA and ESBL ECOLI   - BAL (9/1) with MSSA   - BCx (9/1) with MSSA and cleared on (9/4)   - ECHO (9/6) unable to rule out endocarditis   - Continue on Vanco (9/4-9/22) however noted with rashes of uncertain etiology and replaced with Bradford (9/4 - 10/2) and DAPTO (9/26-10/2) for  completion.     # Proteus and  PSA VAP/ Trachitis   - Continued fevers and SCx (9/29) with MDR  PSA and Proteus   - Continued on Bradford as above however noted to be resistant and changed to Zosyn (10/2 - 10/5) with course complicated by possible drug rxn. Zosyn changed to Aztreonam (10/5 - ).   - RPT SCx (10/3) with  PSA however only intermediate coverage to aztreonam and amikacin and grossly resistant to other antibiotics. PSA likely colonizer and proteus sensitive to aztreonam on prior culture.   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  - Monitor HH     VASCULAR   # LLE POP DVT   - US BL LE (9/10) with acute left deep vein thrombosis of the left popliteal vein.  - RUE swollen and VA DOPPLER RUE (10/3) with R IJ DVT and R brachial DVT.  - Continue on Eliquis     # HD access  - L IJ CLAUDIA (9/27 - )   - Planned for IR permacath conversion on Tuesday 10/10  - Eliquis to fall off on Saturday (needs to be off x 48 hours prior to procedure).   - Resume Eliquis post procedure.     ONC   # Hx of Breast CA   - Patient dx in 2018 and s/p BL mastectomy (radical on right) and chemo and RT.   - Supportive care.     ENDOCRINE  # IDDM2   - Continue on NPH with ISS, however noted with hypoglycemic episodes requiring LRD5.   - Given hypervolemic will hold further IVF and DC NPH.   - Continue on ISS for now and increase to moderate scale vs reintroducing NPH at low dose if needed.     SKIN  # Drug Eruption   - Attempted cefepime (8/12) vs ancef (8/13-8/14) and then noted with drug rxn in ICU. Continue on steroids with prednisone 40 (8/18 - 9/2) then prednisone 30 (9/3-9/7), then prednisone 20 (9/8 - 9/10), then prednisone 10mg (9/11-9/13) then turn off.   - Attempted Zosyn (10/2-10/5) with possible rash. Zosyn turned off with improvement.   - Hold further cephalosporins or PCNs at this time   - Monitor for further drug rxns.     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 While in MICU patient noted with worsening renal failure requiring HD initiation, CGE/ Melena s/p EGD 8/31 found with esophagitis and erosive gastropathy, new right cerebellar infarct and fevers second to ESBL COLI and MSSA VAP, MSSA bacteremia, left ear otitis externa and drug rxn to cephalosporins. 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 Case discussed at length renal and given good UOP attempted renal recovery, however failed, and upon reinitiation of HD attempt. R IJ SHILEY failed. Attempted volume removal with Bumex GTT however course complicated by hypotension requiring transfer back to MICU 9/26 for pressor support. Diuresis dc'ed, pressors weaned off and stress steroids started. L IJ SHILEY placed (9/30) and HD reinstated. Course further complicated by AOCD and  PSA and Proteus VAP. Patient transferred back to RCU 10/1 with course complicated by volume overload and grossly resistant organisms.    NEUROLOGY  # NEW cerebella infarct   - Baseline MS AOX3 with short term memory changes per family however noted with concern for poor mentation in ICU  - MRI (8/17) with NEW right cerebellar infarct and old left PCA/occipital infarcts  - STEPHANIE (8/17) with AV showing two linear mobile echogenic elements attached to valve leaflets consistent with Lambel's excrescence, but no TRINITY thrombus, and lambel's excrescence with uncertain clinical implication.   - EEG (8/11-8/15) with no seizures   - ILR interrogation (9/7) with SR and PACs falsely recorded as AF.   - Continue on ASA and lipitor for medical management     # Seizures   - Course complicated by questionable head and body shaking with prolactin elevated 9/8. Discussed for spot EEG however held given low likelihood of seizures noted and acute respiratory illnesses   - Course further complicated with right arm twitching and RPT EEG (10/4) with no seizure however but noted with increase seizure potential in left posterior quadrants.   - RPT EEG (10/5) with possible focal seizures and risk of focal-onset seizures from multiple locations, most prominent in the left posterior temporal/posterocentral region  - RPT EEG (10/6) with RUE twitching are likely non-epileptic in nature, however risk of focal-onset seizures from multiple locations  - Continue on Keppra PPX     # Metabolic vs Uremic Encephalopathy   - Lethargy noted with progression to stupor thought to be second to uremia.   - RPT CTH (9/26) with vague areas of hypoattenuation within the bilateral cerebellar hemispheres which may be compatible with acute/subacute ischemia.   - Discussed CTH with neurology and no signs of new infarct noted.   - HD reinstated in ICU with improvement in uremia however mentation remained   - Poor mentation likely in setting of infections.   - Monitor mentation closely     CARDIOVASCULAR  # HTN Urgency   # Septic vs vasoplegic shock   - Labile BPs ranging in between SBP 80s-200s and attempted labetalol, however noted with hypotension and bradycardia likely from BB toxicity vs shock state?    - Holding Labetalol, Catapres and Catapres.    - Attempted volume removal with bumex diuresis however noted with return of shock state requiring pressor support and transfer back to ICU.   - Pressors weaned off to stress dose (last day 10/4) and Midodrine   - Continue on Midodrine 30 TID (9/29 - ) and ensure timed prior to HD  - DO NOT HOLD MIDODRINE     # Hx of HFpEF with likely ADHF with volume overload.   - ECHO (7/2023) with EF 59 with severe LVH and diastolic dysfunction   - STEPHANIE (8/18) with normal LVRVSF however noted with increased LA pressures and persistent LA septal bowing into RA.   - ECHO (8/11) with EF 60 with mild LVH, normal LVRVSF, and mild ddfxn.  - Grossly volume overloaded and removing volume with HD midodrine assisted sessions     HEENT   # Left ear OE with acute on chronic left-sided otomastoiditis  - ENT evaluation (9/7) with no mastoid tenderness, however found with positive swelling and excoriation to left auricle and tenderness to manipulation of auricle. Debrided crusting of auricle and EAC evaluated with edema and unable to visualize TM. EAC debrided and found with watery exudate.   - CT IAC with acute on chronic left-sided otitis externa and acute on chronic left-sided otomastoiditis. Superimposed left-sided tympanosclerosis. Superimposed cholesteatoma formation within the left tympanomastoid cavity cannot be excluded  - Completed CiproHC (9/5 - 9/16), Bradford (9/1-10/1) and acetic acid and bacitracin.   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   - Continue on empiric valtrex 500mg BID (9/29 - 10/8) per ophtho and ID  - Continue preservative free artificial tears 4 times a day in the both eye  - Continue lacrilube ointment twice a day in the both eyes     # Oral Lesions with questionable Zoster vs Trauma?   - Patient with tongue pain and seen with anterior ulcerations consolidating and crusting and posterior ulceration.  - Case discussed with pathology resident and culture/ cytology sent.   - HSV negative and Path (9/6) with normal appearing epithelial cells singly and in clusters devoid of viral cytopathic effect.   - Continue on magic mouth wash for pain    RESPIRATORY  # AHHRF second to volume overload   - Hx of CKD and HFpEF presented with SOB and hypercarbia second to volume overload requiring intubation and HD initiation   - Vent weaning attempted however limited requiring tracheostomy (9/1) with IP   - Course complicated by PIPs elevated and found with tracheomalacia and volume overloaded.   - CT CHEST (9/8) with tracheomalacia, BL pleural effusions and continued consolidations   - Continue on vent and given TBM increased PEEP to 8 with improvement in dynamic collapse, but PIP waxes and wanes with volume overloaded and secretions.  - Continue on albuterol, HTS and chest PT  - Continue volume removal with HD   - Hold atrovent given thick secretions   - Hold IPV given high inspiratory pressures      GI  # UGIB   - CGE x 1 episode on 8/24 and melena x 2 episodes on 8/31 likely residual blood.   - EGD (8/31) found with esophagitis and erosive gastropathy  - Continue on PPI BID through late October and then PPI QD     # Dysphagia   - NGT-TF continued   - Pending PEG however needs improvement prior to placement.     RENAL  # Progressive CKD   - Presented volume overload and progressive RUSS on CKD   - POCUS with no signs of hydronephrosis in ICU  - Pressor support started with improvement in renal function in ICU  - Attempted steroid course in ICU without improvement in renal function   - Case discussed at length with renal and initiated on HD in ICU   - Remain on HD while in RCU however noted to be volume overloaded. Attempted Bumex pushes and patient noted with good UOP.   - Attempted renal recovery in RCU however noted with decent UOP, but BUN/ CRE continued to rise without improvement on diuresis.   - Ultimately resumed on HD MWF TIW with midodrine assisted volume removal.     # Hyponatremia   - Continue on salt tabs 1G  (decreased 10/6) and weaning off as tolerated with volume removal   - Monitor sodium     # Hyperphosphatemia to Hypophosphatemia with HD  - PTH normal and elevated phos likely from renal failure  - Phos binder with Renvela started with improvement however then noted with hypophosphatemia and Renvela stopped.     INFECTIOUS DISEASE  # ESBL ECOLI and MSSA VAP c/b MSSA bacteremia   - RVP/ COVID (8/11) negative   - SCx (8/11) with MSSA s/p cefepime (8/12-8/13) and then ancef (8/14) however noted with drug reaction and then changed to vanco and aztreonam (8/12-8/13) in ICU .   - Course complicated by recurrent fevers and return of MSSA with bacteremia.   - SCx (9/1) with MSSA and ESBL ECOLI   - BAL (9/1) with MSSA   - BCx (9/1) with MSSA and cleared on (9/4)   - ECHO (9/6) unable to rule out endocarditis   - Continue on Vanco (9/4-9/22) however noted with rashes of uncertain etiology and replaced with Bradford (9/4 - 10/2) and DAPTO (9/26-10/2) for  completion.     # Proteus and  PSA VAP/ Trachitis   - Continued fevers and SCx (9/29) with MDR  PSA and Proteus   - Continued on Bradford as above however noted with resistance and changed to Zosyn (10/2 - 10/5) with course complicated by possible drug rxn. Zosyn changed to Aztreonam (10/5 - 10/6) however RPT SCx (10/3) with  PSA however only intermediate coverage to aztreonam and amikacin.  PSA grossly resistant to other antibiotics other than cephalosporins however patient with reactions in the past. Case discussed with ID and ID pharmacist and change to Polymyxin (10/6 - ) however continues to spike fevers likely second to Polymyxin only intermediate coverage and Recarbio resistant .   - Overall difficulty with ABX tailoring given allergic rxns and resistant organisms.     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  - Monitor HH     VASCULAR   # LLE POP DVT   - US BL LE (9/10) with acute left deep vein thrombosis of the left popliteal vein.  - RUE swollen and VA DOPPLER RUE (10/3) with R IJ DVT and R brachial DVT.  - Eliquis held for permacath as below (last dose 10/7) and if cancelled or procedure performed needs to be resumed.     # HD access  - L IJ CLAUDIA (9/27 - )   - Planned for IR permacath conversion on Tuesday 10/10 however likely to be held given fever spikes.     ONC   # Hx of Breast CA   - Patient dx in 2018 and s/p BL mastectomy (radical on right) and chemo and RT.   - Supportive care.     ENDOCRINE  # IDDM2   - Continued on NPH with ISS, however noted with hypoglycemic episodes and NPH dc'ed.    - Finger sticks trending up off NPH.   - Continue on NPH 5U with moderate ISS.   - Adjust as need     SKIN  # Drug Eruption   - Attempted cefepime (8/12) vs ancef (8/13-8/14) and then noted with drug rxn in ICU. Continue on steroids with prednisone 40 (8/18 - 9/2) then prednisone 30 (9/3-9/7), then prednisone 20 (9/8 - 9/10), then prednisone 10mg (9/11-9/13) then turn off.   - Attempted Zosyn (10/2-10/5) with possible rash. Zosyn turned off with improvement.   - Hold further cephalosporins or PCNs at this time   - Monitor for further drug rxns.     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 While in MICU patient noted with worsening renal failure requiring HD initiation, CGE/ Melena s/p EGD 8/31 found with esophagitis and erosive gastropathy, new right cerebellar infarct and fevers second to ESBL COLI and MSSA VAP, MSSA bacteremia, left ear otitis externa and drug rxn to cephalosporins. 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 Case discussed at length renal and given good UOP attempted renal recovery, however failed, and upon reinitiation of HD attempt. R IJ SHILEY failed. Attempted volume removal with Bumex GTT however course complicated by hypotension requiring transfer back to MICU 9/26 for pressor support. Diuresis dc'ed, pressors weaned off and stress steroids started. L IJ SHILEY placed (9/30) and HD reinstated. Course further complicated by AOCD and  PSA and Proteus VAP. Patient transferred back to RCU 10/1 with course complicated by volume overload and grossly resistant organisms.    NEUROLOGY  # NEW cerebella infarct   - Baseline MS AOX3 with short term memory changes per family however noted with concern for poor mentation in ICU  - MRI (8/17) with NEW right cerebellar infarct and old left PCA/occipital infarcts  - STEPHANIE (8/17) with AV showing two linear mobile echogenic elements attached to valve leaflets consistent with Lambel's excrescence, but no TRINITY thrombus, and lambel's excrescence with uncertain clinical implication.   - EEG (8/11-8/15) with no seizures   - ILR interrogation (9/7) with SR and PACs falsely recorded as AF.   - Continue on ASA and lipitor for medical management     # Seizures   - Course complicated by questionable head and body shaking with prolactin elevated 9/8. Discussed for spot EEG however held given low likelihood of seizures noted and acute respiratory illnesses   - Course further complicated with right arm twitching and RPT EEG (10/4) with no seizure however but noted with increase seizure potential in left posterior quadrants.   - RPT EEG (10/5) with possible focal seizures and risk of focal-onset seizures from multiple locations, most prominent in the left posterior temporal/posterocentral region  - RPT EEG (10/6) with RUE twitching are likely non-epileptic in nature, however risk of focal-onset seizures from multiple locations  - Continue on Keppra PPX     # Metabolic vs Uremic Encephalopathy   - Lethargy noted with progression to stupor thought to be second to uremia.   - RPT CTH (9/26) with vague areas of hypoattenuation within the bilateral cerebellar hemispheres which may be compatible with acute/subacute ischemia.   - Discussed CTH with neurology and no signs of new infarct noted.   - HD reinstated in ICU with improvement in uremia however mentation remained   - Poor mentation likely in setting of infections.   - Monitor mentation closely     CARDIOVASCULAR  # HTN Urgency   # Septic vs vasoplegic shock   - Labile BPs ranging in between SBP 80s-200s and attempted labetalol, however noted with hypotension and bradycardia likely from BB toxicity vs shock state?    - Holding Labetalol, Catapres and Catapres.    - Attempted volume removal with bumex diuresis however noted with return of shock state requiring pressor support and transfer back to ICU.   - Pressors weaned off to stress dose (last day 10/4) and Midodrine   - Continue on Midodrine 30 TID (9/29 - ) and ensure timed prior to HD  - DO NOT HOLD MIDODRINE     # Hx of HFpEF with likely ADHF with volume overload.   - ECHO (7/2023) with EF 59 with severe LVH and diastolic dysfunction   - STEPHANIE (8/18) with normal LVRVSF however noted with increased LA pressures and persistent LA septal bowing into RA.   - ECHO (8/11) with EF 60 with mild LVH, normal LVRVSF, and mild ddfxn.  - Grossly volume overloaded and removing volume with HD midodrine assisted sessions     HEENT   # Left ear OE with acute on chronic left-sided otomastoiditis  - ENT evaluation (9/7) with no mastoid tenderness, however found with positive swelling and excoriation to left auricle and tenderness to manipulation of auricle. Debrided crusting of auricle and EAC evaluated with edema and unable to visualize TM. EAC debrided and found with watery exudate.   - CT IAC with acute on chronic left-sided otitis externa and acute on chronic left-sided otomastoiditis. Superimposed left-sided tympanosclerosis. Superimposed cholesteatoma formation within the left tympanomastoid cavity cannot be excluded  - Completed CiproHC (9/5 - 9/16), Bradford (9/1-10/1) and acetic acid and bacitracin.   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   - Continue on empiric valtrex 500mg BID (9/29 - 10/8) per ophtho and ID  - Continue preservative free artificial tears 4 times a day in the both eye  - Continue lacrilube ointment twice a day in the both eyes     # Oral Lesions with questionable Zoster vs Trauma?   - Patient with tongue pain and seen with anterior ulcerations consolidating and crusting and posterior ulceration.  - Case discussed with pathology resident and culture/ cytology sent.   - HSV negative and Path (9/6) with normal appearing epithelial cells singly and in clusters devoid of viral cytopathic effect.   - Continue on magic mouth wash for pain    RESPIRATORY  # AHHRF second to volume overload   - Hx of CKD and HFpEF presented with SOB and hypercarbia second to volume overload requiring intubation and HD initiation   - Vent weaning attempted however limited requiring tracheostomy (9/1) with IP   - Course complicated by PIPs elevated and found with tracheomalacia and volume overloaded.   - CT CHEST (9/8) with tracheomalacia, BL pleural effusions and continued consolidations   - Continue on vent and given TBM increased PEEP to 8 with improvement in dynamic collapse, but PIP waxes and wanes with volume overloaded and secretions.  - Continue on albuterol, HTS and chest PT  - Continue volume removal with HD   - Hold atrovent given thick secretions   - Hold IPV given high inspiratory pressures      GI  # UGIB   - CGE x 1 episode on 8/24 and melena x 2 episodes on 8/31 likely residual blood.   - EGD (8/31) found with esophagitis and erosive gastropathy  - Continue on PPI BID through late October and then PPI QD     # Dysphagia   - NGT-TF continued   - Pending PEG however needs improvement prior to placement.     RENAL  # Progressive CKD   - Presented volume overload and progressive RUSS on CKD   - POCUS with no signs of hydronephrosis in ICU  - Pressor support started with improvement in renal function in ICU  - Attempted steroid course in ICU without improvement in renal function   - Case discussed at length with renal and initiated on HD in ICU   - Remain on HD while in RCU however noted to be volume overloaded. Attempted Bumex pushes and patient noted with good UOP.   - Attempted renal recovery in RCU however noted with decent UOP, but BUN/ CRE continued to rise without improvement on diuresis.   - Ultimately resumed on HD MWF TIW with midodrine assisted volume removal.     # Hyponatremia   - Continue on salt tabs 1G  (decreased 10/6) and weaning off as tolerated with volume removal   - Monitor sodium     # Hyperphosphatemia to Hypophosphatemia with HD  - PTH normal and elevated phos likely from renal failure  - Phos binder with Renvela started with improvement however then noted with hypophosphatemia and Renvela stopped.     INFECTIOUS DISEASE  # ESBL ECOLI and MSSA VAP c/b MSSA bacteremia   - RVP/ COVID (8/11) negative   - SCx (8/11) with MSSA s/p cefepime (8/12-8/13) and then ancef (8/14) however noted with drug reaction and then changed to vanco and aztreonam (8/12-8/13) in ICU .   - Course complicated by recurrent fevers and return of MSSA with bacteremia.   - SCx (9/1) with MSSA and ESBL ECOLI   - BAL (9/1) with MSSA   - BCx (9/1) with MSSA and cleared on (9/4)   - ECHO (9/6) unable to rule out endocarditis   - Continue on Vanco (9/4-9/22) however noted with rashes of uncertain etiology and replaced with Bradford (9/4 - 10/2) and DAPTO (9/26-10/2) for  completion.     # Proteus and  PSA VAP/ Trachitis   - Continued fevers and SCx (9/29) with MDR  PSA and Proteus   - Continued on Bradford as above however noted with resistance and changed to Zosyn (10/2 - 10/5) with course complicated by possible drug rxn. Zosyn changed to Aztreonam (10/5 - 10/6) however RPT SCx (10/3) with  PSA however only intermediate coverage to aztreonam and amikacin.  PSA grossly resistant to other antibiotics other than cephalosporins however patient with reactions in the past. Case discussed with ID and ID pharmacist and change to Polymyxin (10/6 - ) however continues to spike fevers likely second to Polymyxin only intermediate coverage and Recarbio resistant .   - Overall difficulty with ABX tailoring given allergic rxns and resistant organisms.     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  - Monitor HH     VASCULAR   # LLE POP DVT   - US BL LE (9/10) with acute left deep vein thrombosis of the left popliteal vein.  - RUE swollen and VA DOPPLER RUE (10/3) with R IJ DVT and R brachial DVT.  - Eliquis held for permacath as below (last dose 10/7) and if cancelled or procedure performed needs to be resumed.     # HD access  - L IJ CLAUDIA (9/27 - )   - Planned for IR permacath conversion on Tuesday 10/10 however likely to be held given fever spikes.     ONC   # Hx of Breast CA   - Patient dx in 2018 and s/p BL mastectomy (radical on right) and chemo and RT.   - Supportive care.     ENDOCRINE  # IDDM2   - Continued on NPH with ISS, however noted with hypoglycemic episodes and NPH dc'ed.    - Finger sticks trending up off NPH.   - Continue on NPH 4U with moderate ISS.   - Adjust as need     SKIN  # Drug Eruption   - Attempted cefepime (8/12) vs ancef (8/13-8/14) and then noted with drug rxn in ICU. Continue on steroids with prednisone 40 (8/18 - 9/2) then prednisone 30 (9/3-9/7), then prednisone 20 (9/8 - 9/10), then prednisone 10mg (9/11-9/13) then turn off.   - Attempted Zosyn (10/2-10/5) with possible rash. Zosyn turned off with improvement.   - Hold further cephalosporins or PCNs at this time   - Monitor for further drug rxns.     ETHICS/ GOC    - Attempted palliative discussion however family not interested and wishes for FULL CODE    DISPO - TBD

## 2023-10-08 NOTE — PROGRESS NOTE ADULT - SUBJECTIVE AND OBJECTIVE BOX
Follow Up:  MSSA bacteremia    Interval History: tmax in chart 100.2 but had a 101 as well last night and still with yellow thick secretions and became hypotensive during HD so only 1 L removed    ROS:    Unobtainable because: trached, altered      Allergies  isoniazid (Rash)  nafcillin (Unknown)  hydrALAZINE (Rash)  vitamin E (Short breath; Urticaria; Hives)  doxycycline (Rash)  cefepime (Rash)  NIFEdipine (Urticaria; Hives)        ANTIMICROBIALS:  polymyxin B IVPB 122635 every 12 hours  valACYclovir 500 every 24 hours      OTHER MEDS:  acetaminophen   Oral Liquid .. 650 milliGRAM(s) Oral every 6 hours PRN  albuterol    0.083% 2.5 milliGRAM(s) Nebulizer every 6 hours  artificial tears (preservative free) Ophthalmic Solution 1 Drop(s) Both EYES every 4 hours  aspirin  chewable 81 milliGRAM(s) Enteral Tube daily  atorvastatin 10 milliGRAM(s) Oral at bedtime  calamine/zinc oxide Lotion 1 Application(s) Topical two times a day PRN  chlorhexidine 0.12% Liquid 15 milliLiter(s) Oral Mucosa every 12 hours  chlorhexidine 2% Cloths 1 Application(s) Topical daily  dextrose 5%. 1000 milliLiter(s) IV Continuous <Continuous>  dextrose 5%. 1000 milliLiter(s) IV Continuous <Continuous>  dextrose 50% Injectable 12.5 Gram(s) IV Push once  dextrose 50% Injectable 25 Gram(s) IV Push once  dextrose 50% Injectable 25 Gram(s) IV Push once  dextrose 50% Injectable 25 Gram(s) IV Push once  dextrose Oral Gel 15 Gram(s) Oral once PRN  epoetin odalis (EPOGEN) Injectable 52003 Unit(s) IV Push <User Schedule>  ferrous    sulfate Liquid 300 milliGRAM(s) Oral daily  glucagon  Injectable 1 milliGRAM(s) IntraMuscular once  insulin lispro (ADMELOG) corrective regimen sliding scale   SubCutaneous every 6 hours  insulin NPH human recombinant 4 Unit(s) SubCutaneous every 6 hours  levETIRAcetam  Solution 1000 milliGRAM(s) Oral daily  levETIRAcetam  Solution 250 milliGRAM(s) Oral <User Schedule>  melatonin 3 milliGRAM(s) Oral at bedtime  midodrine. 30 milliGRAM(s) Oral every 8 hours  pantoprazole  Injectable 40 milliGRAM(s) IV Push two times a day  petrolatum Ophthalmic Ointment 1 Application(s) Both EYES two times a day  sodium chloride 1 Gram(s) Oral two times a day  sodium chloride 0.9% lock flush 10 milliLiter(s) IV Push every 1 hour PRN  sodium chloride 3%  Inhalation 4 milliLiter(s) Inhalation every 6 hours      Vital Signs Last 24 Hrs  T(C): 37.8 (08 Oct 2023 08:58), Max: 37.9 (08 Oct 2023 05:33)  T(F): 100.1 (08 Oct 2023 08:58), Max: 100.2 (08 Oct 2023 05:33)  HR: 103 (08 Oct 2023 09:49) (94 - 115)  BP: 114/40 (08 Oct 2023 08:58) (92/20 - 114/50)  BP(mean): 58 (08 Oct 2023 08:58) (45 - 66)  RR: 22 (08 Oct 2023 08:58) (22 - 22)  SpO2: 99% (08 Oct 2023 09:49) (98% - 100%)    Parameters below as of 08 Oct 2023 08:58  Patient On (Oxygen Delivery Method): ventilator, AC    O2 Concentration (%): 35    Physical Exam:  General:    trached   Respiratory:   trach on vent  abd:  distended but soft  :     willard  Musculoskeletal : generalized edema  Skin: no visible rash not some erythema on legs  vascular: TRISHA odom                                      8.2    16.42 )-----------( 388      ( 07 Oct 2023 21:40 )             26.2       10-07    135  |  98  |  35<H>  ----------------------------<  302<H>  4.0   |  27  |  1.48<H>    Ca    8.3<L>      07 Oct 2023 21:40  Phos  2.7     10-07  Mg     1.70     10-07        Urinalysis Basic - ( 07 Oct 2023 21:40 )    Color: x / Appearance: x / SG: x / pH: x  Gluc: 302 mg/dL / Ketone: x  / Bili: x / Urobili: x   Blood: x / Protein: x / Nitrite: x   Leuk Esterase: x / RBC: x / WBC x   Sq Epi: x / Non Sq Epi: x / Bacteria: x        MICROBIOLOGY:  v  Trach Asp Tracheal Aspirate  10-03-23   Numerous Pseudomonas aeruginosa (Carbapenem Resistant) Susceptibility to  follow.  Normal Respiratory Cate absent  --  Pseudomonas aeruginosa (Carbapenem Resistant)      .Blood Blood-Venous  09-29-23   No growth at 5 days  --  --      .Blood Blood-Peripheral  09-29-23   No growth at 5 days  --  --      ET Tube ET Tube  09-29-23   Rare Yeast  --  --      ET Tube ET Tube  09-29-23   Numerous Proteus mirabilis  Moderate Pseudomonas aeruginosa (Carbapenem Resistant)  Normal Respiratory Cate absent  --  Proteus mirabilis  Pseudomonas aeruginosa (Carbapenem Resistant)      Clean Catch Clean Catch (Midstream)  09-26-23   10,000 - 49,000 CFU/mL Candida albicans "Susceptibilities not performed"  --  --      .Blood Blood-Peripheral  09-26-23   No growth at 5 days  --  --      .Blood Blood-Venous  09-20-23   No growth at 5 days  --  --      .Blood Blood-Peripheral  09-20-23   No growth at 5 days  --  --      Ear Ear  09-13-23   Moderate Candida albicans "Susceptibilities not performed"  --  --      .Blood Blood-Venous  09-10-23   No growth at 5 days  --  --      .Sputum Sputum  09-10-23   Normal Respiratory Cate present  --    Rare polymorphonuclear leukocytes per low power field  No Squamous epithelial cells per low power field  Rare Yeast like cells seen per oil power field  Rare Gram Positive Cocci in Clusters seen per oil power field      .Blood Blood-Peripheral  09-10-23   No growth at 5 days  --  --      .Sputum Sputum  09-08-23   Normal Respiratory Cate present  --    Numerous polymorphonuclear leukocytes per low power field  Numerous Squamous epithelial cells per low power field  Few Yeast like cells per oil power field  Results consistent with oropharyngeal contamination                RADIOLOGY:  Images independently visualized and reviewed personally, findings as below    Follow Up:      Interval History:    ROS:    Unobtainable because:  All other systems negative    Constitutional: no fever, no chills  Head: no trauma  Eyes: no vision changes, no eye pain  ENT:  no sore throat, no rhinorrhea  Cardiovascular:  no chest pain, no palpitation  Respiratory:  no SOB, no cough  GI:  no abd pain, no vomiting, no diarrhea  urinary: no dysuria, no hematuria, no flank pain  musculoskeletal:  no joint pain, no joint swelling  skin:  no rash  neurology:  no headache, no seizure, no change in mental status  psych: no anxiety, no depression         Allergies  isoniazid (Rash)  nafcillin (Unknown)  hydrALAZINE (Rash)  vitamin E (Short breath; Urticaria; Hives)  doxycycline (Rash)  cefepime (Rash)  NIFEdipine (Urticaria; Hives)        ANTIMICROBIALS:  polymyxin B IVPB 126529 every 12 hours  valACYclovir 500 every 24 hours      OTHER MEDS:  acetaminophen   Oral Liquid .. 650 milliGRAM(s) Oral every 6 hours PRN  albuterol    0.083% 2.5 milliGRAM(s) Nebulizer every 6 hours  artificial tears (preservative free) Ophthalmic Solution 1 Drop(s) Both EYES every 4 hours  aspirin  chewable 81 milliGRAM(s) Enteral Tube daily  atorvastatin 10 milliGRAM(s) Oral at bedtime  calamine/zinc oxide Lotion 1 Application(s) Topical two times a day PRN  chlorhexidine 0.12% Liquid 15 milliLiter(s) Oral Mucosa every 12 hours  chlorhexidine 2% Cloths 1 Application(s) Topical daily  dextrose 5%. 1000 milliLiter(s) IV Continuous <Continuous>  dextrose 5%. 1000 milliLiter(s) IV Continuous <Continuous>  dextrose 50% Injectable 12.5 Gram(s) IV Push once  dextrose 50% Injectable 25 Gram(s) IV Push once  dextrose 50% Injectable 25 Gram(s) IV Push once  dextrose 50% Injectable 25 Gram(s) IV Push once  dextrose Oral Gel 15 Gram(s) Oral once PRN  epoetin odalis (EPOGEN) Injectable 44165 Unit(s) IV Push <User Schedule>  ferrous    sulfate Liquid 300 milliGRAM(s) Oral daily  glucagon  Injectable 1 milliGRAM(s) IntraMuscular once  insulin lispro (ADMELOG) corrective regimen sliding scale   SubCutaneous every 6 hours  insulin NPH human recombinant 4 Unit(s) SubCutaneous every 6 hours  levETIRAcetam  Solution 1000 milliGRAM(s) Oral daily  levETIRAcetam  Solution 250 milliGRAM(s) Oral <User Schedule>  melatonin 3 milliGRAM(s) Oral at bedtime  midodrine. 30 milliGRAM(s) Oral every 8 hours  pantoprazole  Injectable 40 milliGRAM(s) IV Push two times a day  petrolatum Ophthalmic Ointment 1 Application(s) Both EYES two times a day  sodium chloride 1 Gram(s) Oral two times a day  sodium chloride 0.9% lock flush 10 milliLiter(s) IV Push every 1 hour PRN  sodium chloride 3%  Inhalation 4 milliLiter(s) Inhalation every 6 hours      Vital Signs Last 24 Hrs  T(C): 37.8 (08 Oct 2023 08:58), Max: 37.9 (08 Oct 2023 05:33)  T(F): 100.1 (08 Oct 2023 08:58), Max: 100.2 (08 Oct 2023 05:33)  HR: 103 (08 Oct 2023 09:49) (94 - 115)  BP: 114/40 (08 Oct 2023 08:58) (92/20 - 114/50)  BP(mean): 58 (08 Oct 2023 08:58) (45 - 66)  RR: 22 (08 Oct 2023 08:58) (22 - 22)  SpO2: 99% (08 Oct 2023 09:49) (98% - 100%)    Parameters below as of 08 Oct 2023 08:58  Patient On (Oxygen Delivery Method): ventilator, AC    O2 Concentration (%): 35    Physical Exam:  General:    NAD,  non toxic  Head: atraumatic, normocephalic  Eye: normal sclera and conjunctiva  ENT:    no oropharyngeal lesions,   no LAD,   neck supple  Cardio:     regular S1, S2,  no murmur  Respiratory:    clear b/l,    no wheezing  abd:     soft,   BS +,   no tenderness  :   no CVAT,  no suprapubic tenderness,   no  willard  Musculoskeletal:   no joint swelling  vascular: no phlebitis  Skin:    no rash  Neurologic:     no focal deficit  psych: normal affect                          8.2    16.42 )-----------( 388      ( 07 Oct 2023 21:40 )             26.2       10-07    135  |  98  |  35<H>  ----------------------------<  302<H>  4.0   |  27  |  1.48<H>    Ca    8.3<L>      07 Oct 2023 21:40  Phos  2.7     10-07  Mg     1.70     10-07        Urinalysis Basic - ( 07 Oct 2023 21:40 )    Color: x / Appearance: x / SG: x / pH: x  Gluc: 302 mg/dL / Ketone: x  / Bili: x / Urobili: x   Blood: x / Protein: x / Nitrite: x   Leuk Esterase: x / RBC: x / WBC x   Sq Epi: x / Non Sq Epi: x / Bacteria: x        MICROBIOLOGY:  v  Trach Asp Tracheal Aspirate  10-03-23   Numerous Pseudomonas aeruginosa (Carbapenem Resistant) Susceptibility to  follow.  Normal Respiratory Cate absent  --  Pseudomonas aeruginosa (Carbapenem Resistant)      .Blood Blood-Venous  09-29-23   No growth at 5 days  --  --      .Blood Blood-Peripheral  09-29-23   No growth at 5 days  --  --      ET Tube ET Tube  09-29-23   Rare Yeast  --  --      ET Tube ET Tube  09-29-23   Numerous Proteus mirabilis  Moderate Pseudomonas aeruginosa (Carbapenem Resistant)  Normal Respiratory Cate absent  --  Proteus mirabilis  Pseudomonas aeruginosa (Carbapenem Resistant)      Clean Catch Clean Catch (Midstream)  09-26-23   10,000 - 49,000 CFU/mL Candida albicans "Susceptibilities not performed"  --  --      .Blood Blood-Peripheral  09-26-23   No growth at 5 days  --  --      .Blood Blood-Venous  09-20-23   No growth at 5 days  --  --      .Blood Blood-Peripheral  09-20-23   No growth at 5 days  --  --      Ear Ear  09-13-23   Moderate Candida albicans "Susceptibilities not performed"  --  --      .Blood Blood-Venous  09-10-23   No growth at 5 days  --  --      .Sputum Sputum  09-10-23   Normal Respiratory Cate present  --    Rare polymorphonuclear leukocytes per low power field  No Squamous epithelial cells per low power field  Rare Yeast like cells seen per oil power field  Rare Gram Positive Cocci in Clusters seen per oil power field      .Blood Blood-Peripheral  09-10-23   No growth at 5 days  --  --      .Sputum Sputum  09-08-23   Normal Respiratory Cate present  --    Numerous polymorphonuclear leukocytes per low power field  Numerous Squamous epithelial cells per low power field  Few Yeast like cells per oil power field  Results consistent with oropharyngeal contamination                RADIOLOGY:  Images independently visualized and reviewed personally, findings as below  < from: CT Head No Cont (10.03.23 @ 20:46) >  IMPRESSION:    No acute intracranial hemorrhage or mass effect. Evaluation of the   posterior fossa degraded by streak artifact from dental amalgam.   Lucencies in the bilateral cerebellum may be artifactual.    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    Bilateral middle ear and mastoid effusions which are also seen on prior   exam.    < end of copied text >  < from: TTE W or WO Ultrasound Enhancing Agent (10.01.23 @ 10:07) >  CONCLUSIONS:      1. Left ventricular systolic function is normal with an ejection fraction visually estimated at 60 to 65 %.   2. The right ventricle is not well visualized.   3. Although there is no evidence of endocarditis on this study, the valves are not visualized well. Suggest STEPHANIE if clinically appropriate.    < end of copied text >

## 2023-10-08 NOTE — PROGRESS NOTE ADULT - SUBJECTIVE AND OBJECTIVE BOX
Patient is a 73y old  Female who presents with a chief complaint of Respiratory distress (08 Oct 2023 20:25)      SUBJECTIVE / OVERNIGHT EVENTS: 101  fever overnight, ID recs: bronch    MEDICATIONS  (STANDING):  albuterol    0.083% 2.5 milliGRAM(s) Nebulizer every 6 hours  artificial tears (preservative free) Ophthalmic Solution 1 Drop(s) Both EYES every 4 hours  aspirin  chewable 81 milliGRAM(s) Enteral Tube daily  atorvastatin 10 milliGRAM(s) Oral at bedtime  chlorhexidine 0.12% Liquid 15 milliLiter(s) Oral Mucosa every 12 hours  chlorhexidine 2% Cloths 1 Application(s) Topical daily  dextrose 5%. 1000 milliLiter(s) (50 mL/Hr) IV Continuous <Continuous>  dextrose 5%. 1000 milliLiter(s) (100 mL/Hr) IV Continuous <Continuous>  dextrose 50% Injectable 12.5 Gram(s) IV Push once  dextrose 50% Injectable 25 Gram(s) IV Push once  dextrose 50% Injectable 25 Gram(s) IV Push once  dextrose 50% Injectable 25 Gram(s) IV Push once  epoetin odalis (EPOGEN) Injectable 50134 Unit(s) IV Push <User Schedule>  ferrous    sulfate Liquid 300 milliGRAM(s) Oral daily  glucagon  Injectable 1 milliGRAM(s) IntraMuscular once  insulin lispro (ADMELOG) corrective regimen sliding scale   SubCutaneous every 6 hours  insulin NPH human recombinant 4 Unit(s) SubCutaneous every 6 hours  levETIRAcetam  Solution 1000 milliGRAM(s) Oral daily  levETIRAcetam  Solution 250 milliGRAM(s) Oral <User Schedule>  melatonin 3 milliGRAM(s) Oral at bedtime  midodrine. 30 milliGRAM(s) Oral every 8 hours  pantoprazole  Injectable 40 milliGRAM(s) IV Push two times a day  petrolatum Ophthalmic Ointment 1 Application(s) Both EYES two times a day  polymyxin B IVPB 642260 Unit(s) IV Intermittent every 12 hours  sodium chloride 1 Gram(s) Oral two times a day  sodium chloride 3%  Inhalation 4 milliLiter(s) Inhalation every 6 hours    MEDICATIONS  (PRN):  acetaminophen   Oral Liquid .. 650 milliGRAM(s) Oral every 6 hours PRN Temp greater or equal to 38C (100.4F), Mild Pain (1 - 3)  calamine/zinc oxide Lotion 1 Application(s) Topical two times a day PRN Rash and/or Itching  dextrose Oral Gel 15 Gram(s) Oral once PRN Blood Glucose LESS THAN 70 milliGRAM(s)/deciliter  sodium chloride 0.9% lock flush 10 milliLiter(s) IV Push every 1 hour PRN Pre/post blood products, medications, blood draw, and to maintain line patency      Vital Signs Last 24 Hrs  T(F): 98.6 (10-08-23 @ 13:22), Max: 100.2 (10-08-23 @ 05:33)  HR: 101 (10-08-23 @ 15:41) (101 - 114)  BP: 90/35 (10-08-23 @ 13:22) (90/35 - 114/40)  RR: 22 (10-08-23 @ 13:22) (22 - 22)  SpO2: 100% (10-08-23 @ 15:41) (98% - 100%)  Telemetry:   CAPILLARY BLOOD GLUCOSE      POCT Blood Glucose.: 286 mg/dL (08 Oct 2023 17:16)  POCT Blood Glucose.: 330 mg/dL (08 Oct 2023 11:40)  POCT Blood Glucose.: 342 mg/dL (08 Oct 2023 06:24)  POCT Blood Glucose.: 300 mg/dL (07 Oct 2023 23:04)    I&O's Summary    07 Oct 2023 07:01  -  08 Oct 2023 07:00  --------------------------------------------------------  IN: 940 mL / OUT: 1400 mL / NET: -460 mL        PHYSICAL EXAM:  GENERAL: NAD, well-developed  HEAD:  Atraumatic, Normocephalic  EYES: EOMI, PERRLA, conjunctiva and sclera clear  NECK: Supple, No JVD  CHEST/LUNG: Clear to auscultation bilaterally; No wheeze  HEART: Regular rate and rhythm; No murmurs, rubs, or gallops  ABDOMEN: Soft, Nontender, Nondistended; Bowel sounds present  EXTREMITIES:  2+ Peripheral Pulses, No clubbing, cyanosis, or edema  PSYCH: AAOx3  NEUROLOGY: non-focal  SKIN: No rashes or lesions    LABS:                        8.2    16.42 )-----------( 388      ( 07 Oct 2023 21:40 )             26.2     10-07    135  |  98  |  35<H>  ----------------------------<  302<H>  4.0   |  27  |  1.48<H>    Ca    8.3<L>      07 Oct 2023 21:40  Phos  2.7     10-07  Mg     1.70     10-07            Urinalysis Basic - ( 07 Oct 2023 21:40 )    Color: x / Appearance: x / SG: x / pH: x  Gluc: 302 mg/dL / Ketone: x  / Bili: x / Urobili: x   Blood: x / Protein: x / Nitrite: x   Leuk Esterase: x / RBC: x / WBC x   Sq Epi: x / Non Sq Epi: x / Bacteria: x        RADIOLOGY & ADDITIONAL TESTS:    Imaging Personally Reviewed:    Consultant(s) Notes Reviewed:      Care Discussed with Consultants/Other Providers:

## 2023-10-08 NOTE — PROGRESS NOTE ADULT - ASSESSMENT
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 family declined renal biopsy started on prednisone  s/p trach 9/1 and BAL with MSSA   ET cx with ESBL and MSSA  started on ana cristina 9/1  blood cx 9/1: MSSA   repeat negative 9/4, 9/8  CXR with b/l opacities, R increased  L ear edema and otitis externa, the last cx showed candida and ENT stated it could be fungal (saw black spores?)    initial resp failure for ?fluid ovrer load but also had MSSA in the sputum, got vanco, aztreonam one day then cefepime and had rash, renal failure which was considered due to cefepime and then received 2 days of cefazolin and then off, now with trach and bronc on 9/1 with MSSA bacteremia and BAL also MSSA,   another ET cx with MSSA and ESBL E-coli  hypotension, MSSA bacteremia likely due to pneumonia, repeat blood cx negative 9/4, TTE limited unable to exclude endocarditis, s/p a 4 week course of vanco then dapto through 10/2  L otitis externa on ana cristina since 9/1 but worsening edema and black discoloration with bullae forming and persistent fever (ear cx was negative)  Ct showed acute on chronic otitis externa and otomastoiditis , superimposed cholesteatoma can not be excluded, no malignant otitis externa  pt again febrile, ENT stated there was black spores which could be a fungal infection and the last cx showed candida albicans s/p 7 days of fluconazole   new erythematous patchy rash, did not improved after discontinuing the ana cristina so vanco switched to dapto but still with rash and it also started after pt was started on fluconazole so not sure which caused it  also hypotensive now and ?uveitis vs endophthalmitis, head CT with acute/subacute ischemia and old occipital infarct, chest/abd CT with unchanged  RUL consolidation, decreased BEVERLY consolidation  pt uremic as well, s/p shiley and resumed on HD 9/27  s/p bronc with excessive dynamic airway collapse s/p trach change and some improvement  pt febrile and tachy on 9/29, sputum cx with  and proteus and carbapenem resistant pseudomonas (S to zosyn)   ana cristina was switched to zosyn 10/1, again with rash 10/2, fever and increasing WBC eos, so switched to aztreonam 10/3, but repeat sputum cx with pseudomonas also I to aztreonam, amikacin, polymixin and R to recarbrio  pt still with fever and thick secretions   * no better antibiotic coverage in view of resistance and multiple allergies so will continue with polymixin  * consider bronc and suctioning  * if fever, repeat blood cx   * ENT stated the otitis external completely resolved  * monitor CBC/diff and renal function  * stop valtrex, started as per ophthalmology, now eye has improved    The above assessment and plan was discussed with the primary team    Yumiko Conner MD  contact on teams  After 5pm and on weekends call [phone]

## 2023-10-08 NOTE — PROGRESS NOTE ADULT - ASSESSMENT
IMPRESSION: 73F w/ HTN, DM2, CVA, breast CA-bilateral mastectomy, recurrent aspiration pneumonia/respiratory failure, and CKD, 8/11/23 p/w acute hypercapnic respiratory failure; c/b RUSS    (1)Renal - RUSS on CKD4 ==>now newly ESRD. Warranting conversion of shiley to tunneled cath at this point    (2)Lytes - hypokalemia/hypophosphatemia; best that we change tube feeds from Nepro to Glucerna    (3)Pulm- vent-dependent    (4)CV- BP soft, on midodrine 30 q8h    RECOMMEND:  (1)Change tube feeds from Nepro to Glucerna  (2)Tunneled cath placement 10/10/23 per IR  (3)Next HD tomorrow: 3hrs, 2kg UF as able, pressors as needed to keep SBP>90; Epogen with HD  (4)Dose new meds for GFR <10/HD    Nilda Espinoza DNP  [facility]  [phone]

## 2023-10-08 NOTE — PROGRESS NOTE ADULT - SUBJECTIVE AND OBJECTIVE BOX
NEPHROLOGY     Patient seen and examined with family at bedside, trach to vent, low grade temps overnight, PUF last night removed 1L, in no acute distress.     MEDICATIONS  (STANDING):  albuterol    0.083% 2.5 milliGRAM(s) Nebulizer every 6 hours  artificial tears (preservative free) Ophthalmic Solution 1 Drop(s) Both EYES every 4 hours  aspirin  chewable 81 milliGRAM(s) Enteral Tube daily  atorvastatin 10 milliGRAM(s) Oral at bedtime  chlorhexidine 0.12% Liquid 15 milliLiter(s) Oral Mucosa every 12 hours  chlorhexidine 2% Cloths 1 Application(s) Topical daily  dextrose 5%. 1000 milliLiter(s) (100 mL/Hr) IV Continuous <Continuous>  dextrose 5%. 1000 milliLiter(s) (50 mL/Hr) IV Continuous <Continuous>  dextrose 50% Injectable 25 Gram(s) IV Push once  dextrose 50% Injectable 25 Gram(s) IV Push once  dextrose 50% Injectable 25 Gram(s) IV Push once  dextrose 50% Injectable 12.5 Gram(s) IV Push once  epoetin odalis (EPOGEN) Injectable 10750 Unit(s) IV Push <User Schedule>  ferrous    sulfate Liquid 300 milliGRAM(s) Oral daily  glucagon  Injectable 1 milliGRAM(s) IntraMuscular once  insulin lispro (ADMELOG) corrective regimen sliding scale   SubCutaneous every 6 hours  insulin NPH human recombinant 4 Unit(s) SubCutaneous every 6 hours  levETIRAcetam  Solution 1000 milliGRAM(s) Oral daily  levETIRAcetam  Solution 250 milliGRAM(s) Oral <User Schedule>  melatonin 3 milliGRAM(s) Oral at bedtime  midodrine. 30 milliGRAM(s) Oral every 8 hours  pantoprazole  Injectable 40 milliGRAM(s) IV Push two times a day  petrolatum Ophthalmic Ointment 1 Application(s) Both EYES two times a day  polymyxin B IVPB 345529 Unit(s) IV Intermittent every 12 hours  sodium chloride 1 Gram(s) Oral two times a day  sodium chloride 3%  Inhalation 4 milliLiter(s) Inhalation every 6 hours  valACYclovir 500 milliGRAM(s) Oral every 24 hours    VITALS:  T(C): , Max: 37.9 (10-08-23 @ 05:33)  T(F): , Max: 100.2 (10-08-23 @ 05:33)  HR: 103 (10-08-23 @ 07:02)  BP: 101/42 (10-08-23 @ 05:33)  BP(mean): 60 (10-08-23 @ 05:33)  RR: 22 (10-08-23 @ 05:33)  SpO2: 99% (10-08-23 @ 07:02)    I and O's:    10-07 @ 07:01  -  10-08 @ 07:00  --------------------------------------------------------  IN: 940 mL / OUT: 1400 mL / NET: -460 mL    PHYSICAL EXAM:  Constitutional: minimally communicative at present; on vent  HEENT: trach-vent, (+)NGT  Respiratory: coarse BS b/l  Cardiovascular: tachy reg s1s2  Gastrointestinal: BS+, soft, NT/ND  Extremities: + peripheral edema  Neurological: tone WNL  Skin: No rashes  [facility]    LABS:                        8.2    16.42 )-----------( 388      ( 07 Oct 2023 21:40 )             26.2     10-07    135  |  98  |  35<H>  ----------------------------<  302<H>  4.0   |  27  |  1.48<H>    Ca    8.3<L>      07 Oct 2023 21:40  Phos  2.7     10-07  Mg     1.70     10-07

## 2023-10-09 NOTE — PROGRESS NOTE ADULT - SUBJECTIVE AND OBJECTIVE BOX
Patient is a 73y old  Female who presents with a chief complaint of Respiratory distress (09 Oct 2023 19:54)      SUBJECTIVE / OVERNIGHT EVENTS: long conversation w daughter and  re hospital course, prognosis and possibility of transfer to [facility]. they are discouraged re the prognosis they are given by Pulm , neuro, ID. they feel she improved post respiratory failure when hospitalized at [facility] in the past. i explained that she is much sicker now, on HD, s/p additional stroke, numerous aspiration PNAs, numerous allergies to Abx, DVT.     MEDICATIONS  (STANDING):  albuterol    0.083% 2.5 milliGRAM(s) Nebulizer every 6 hours  artificial tears (preservative free) Ophthalmic Solution 1 Drop(s) Both EYES every 4 hours  aspirin  chewable 81 milliGRAM(s) Enteral Tube daily  atorvastatin 10 milliGRAM(s) Oral at bedtime  chlorhexidine 0.12% Liquid 15 milliLiter(s) Oral Mucosa every 12 hours  chlorhexidine 2% Cloths 1 Application(s) Topical daily  dextrose 5%. 1000 milliLiter(s) (50 mL/Hr) IV Continuous <Continuous>  dextrose 5%. 1000 milliLiter(s) (100 mL/Hr) IV Continuous <Continuous>  dextrose 50% Injectable 25 Gram(s) IV Push once  dextrose 50% Injectable 25 Gram(s) IV Push once  dextrose 50% Injectable 25 Gram(s) IV Push once  dextrose 50% Injectable 12.5 Gram(s) IV Push once  epoetin odalis (EPOGEN) Injectable 30119 Unit(s) IV Push <User Schedule>  ferrous    sulfate Liquid 300 milliGRAM(s) Oral daily  glucagon  Injectable 1 milliGRAM(s) IntraMuscular once  insulin lispro (ADMELOG) corrective regimen sliding scale   SubCutaneous every 6 hours  insulin NPH human recombinant 6 Unit(s) SubCutaneous every 6 hours  levETIRAcetam  Solution 1000 milliGRAM(s) Oral daily  levETIRAcetam  Solution 250 milliGRAM(s) Oral <User Schedule>  midodrine. 30 milliGRAM(s) Oral every 8 hours  pantoprazole  Injectable 40 milliGRAM(s) IV Push two times a day  petrolatum Ophthalmic Ointment 1 Application(s) Both EYES two times a day  polymyxin B IVPB 845422 Unit(s) IV Intermittent every 12 hours  sodium chloride 1 Gram(s) Oral two times a day    MEDICATIONS  (PRN):  acetaminophen   Oral Liquid .. 650 milliGRAM(s) Oral every 6 hours PRN Temp greater or equal to 38C (100.4F), Mild Pain (1 - 3)  calamine/zinc oxide Lotion 1 Application(s) Topical two times a day PRN Rash and/or Itching  dextrose Oral Gel 15 Gram(s) Oral once PRN Blood Glucose LESS THAN 70 milliGRAM(s)/deciliter  sodium chloride 0.9% lock flush 10 milliLiter(s) IV Push every 1 hour PRN Pre/post blood products, medications, blood draw, and to maintain line patency      Vital Signs Last 24 Hrs  T(F): 98.9 (10-09-23 @ 15:28), Max: 99.4 (10-09-23 @ 11:55)  HR: 113 (10-09-23 @ 19:56) (82 - 113)  BP: 90/39 (10-09-23 @ 15:28) (90/39 - 105/52)  RR: 23 (10-09-23 @ 15:28) (20 - 23)  SpO2: 99% (10-09-23 @ 19:56) (96% - 100%)  Telemetry:   CAPILLARY BLOOD GLUCOSE      POCT Blood Glucose.: 258 mg/dL (09 Oct 2023 17:28)  POCT Blood Glucose.: 253 mg/dL (09 Oct 2023 11:16)  POCT Blood Glucose.: 260 mg/dL (09 Oct 2023 06:19)  POCT Blood Glucose.: 243 mg/dL (08 Oct 2023 23:47)    I&O's Summary    08 Oct 2023 07:01  -  09 Oct 2023 07:00  --------------------------------------------------------  IN: 480 mL / OUT: 0 mL / NET: 480 mL    09 Oct 2023 07:01  -  09 Oct 2023 20:44  --------------------------------------------------------  IN: 400 mL / OUT: 2067 mL / NET: -1667 mL        PHYSICAL EXAM:  GENERAL: NAD, well-developed  HEAD:  Atraumatic, Normocephalic  EYES: EOMI, PERRLA, conjunctiva and sclera clear  NECK: Supple, No JVD  CHEST/LUNG: Clear to auscultation bilaterally; No wheeze  HEART: Regular rate and rhythm; No murmurs, rubs, or gallops  ABDOMEN: Soft, Nontender, Nondistended; Bowel sounds present  EXTREMITIES:  2+ Peripheral Pulses, No clubbing, cyanosis, or edema  PSYCH: AAOx3  NEUROLOGY: non-focal  SKIN: No rashes or lesions    LABS:                        8.1    13.81 )-----------( 340      ( 09 Oct 2023 06:25 )             25.8     10-09    132<L>  |  95<L>  |  43<H>  ----------------------------<  256<H>  3.9   |  24  |  1.76<H>    Ca    8.5      09 Oct 2023 06:25  Phos  2.9     10-09  Mg     1.80     10-09    TPro  5.6<L>  /  Alb  2.0<L>  /  TBili  0.3  /  DBili  <0.2  /  AST  52<H>  /  ALT  30  /  AlkPhos  330<H>  10-09          Urinalysis Basic - ( 09 Oct 2023 06:25 )    Color: x / Appearance: x / SG: x / pH: x  Gluc: 256 mg/dL / Ketone: x  / Bili: x / Urobili: x   Blood: x / Protein: x / Nitrite: x   Leuk Esterase: x / RBC: x / WBC x   Sq Epi: x / Non Sq Epi: x / Bacteria: x        RADIOLOGY & ADDITIONAL TESTS:    Imaging Personally Reviewed:    Consultant(s) Notes Reviewed:      Care Discussed with Consultants/Other Providers:

## 2023-10-09 NOTE — PROGRESS NOTE ADULT - ASSESSMENT
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 family declined renal biopsy started on prednisone  s/p trach 9/1 and BAL with MSSA   ET cx with ESBL and MSSA  started on ana cristina 9/1  blood cx 9/1: MSSA   repeat negative 9/4, 9/8  CXR with b/l opacities, R increased  L ear edema and otitis externa, the last cx showed candida and ENT stated it could be fungal (saw black spores?)    initial resp failure for ?fluid ovrer load but also had MSSA in the sputum, got vanco, aztreonam one day then cefepime and had rash, renal failure which was considered due to cefepime and then received 2 days of cefazolin and then off, now with trach and bronc on 9/1 with MSSA bacteremia and BAL also MSSA,   another ET cx with MSSA and ESBL E-coli  hypotension, MSSA bacteremia likely due to pneumonia, repeat blood cx negative 9/4, TTE limited unable to exclude endocarditis, s/p a 4 week course of vanco then dapto through 10/2  L otitis externa on ana cristina since 9/1 but worsening edema and black discoloration with bullae forming and persistent fever (ear cx was negative)  Ct showed acute on chronic otitis externa and otomastoiditis , superimposed cholesteatoma can not be excluded, no malignant otitis externa  pt again febrile, ENT stated there was black spores which could be a fungal infection and the last cx showed candida albicans s/p 7 days of fluconazole   new erythematous patchy rash, did not improved after discontinuing the ana cristina so vanco switched to dapto but still with rash and it also started after pt was started on fluconazole so not sure which caused it  also hypotensive now and ?uveitis vs endophthalmitis, head CT with acute/subacute ischemia and old occipital infarct, chest/abd CT with unchanged  RUL consolidation, decreased BEVERLY consolidation  pt uremic as well, s/p shiley and resumed on HD 9/27  s/p bronc with excessive dynamic airway collapse s/p trach change and some improvement  pt febrile and tachy on 9/29, sputum cx with  and proteus and carbapenem resistant pseudomonas (S to zosyn)   ana cristina was switched to zosyn 10/1, again with rash 10/2, fever and increasing WBC eos, so switched to aztreonam 10/3, but repeat sputum cx with pseudomonas also I to aztreonam, amikacin, polymixin and R to recarbrio  pt still with low grade fever and thick secretions   * no better antibiotic coverage in view of resistance and multiple allergies so will continue with polymixin  * will likely complete a 10 day course, aztreonam started 10/3, now day 7  * consider bronc and suctioning if no contraindications  * if fever, repeat blood cx   * ENT stated the otitis external completely resolved  * monitor CBC/diff and renal function  * stop valtrex, started as per ophthalmology, now eye has improved    The above assessment and plan was discussed with the primary team    Yumiko Conner MD  contact on teams  After 5pm and on weekends call [phone]

## 2023-10-09 NOTE — PROGRESS NOTE ADULT - ASSESSMENT
74 y/o F well known to me from my [facility] [place] outpt practice. she was admitted at [facility] 7/12-7/22 w aspiration PNA, was treated w CEFEPIME, developed an allergic rash,  dCHF, + MAC on AFB culture, had been progressively getting more and more lethargic and dyspneic at home since DC.   In  am of 8/11/23  ptn presented with respiratory distress w hypoxia and hypercarbia requiring intubation 2/2 volume overload +/- Asp PNA      Neuro   responds appropriately   Baseline MS AOx3, aphasic   - h/o CVA , on aspirin and statin . resumed w feeding tube, ASA resumed 8/26  - eeg  2/2 tremors, no sz focus  - less responsive in the past few days  - MRI 8/17:  new R cerebellar infarct, old left PCA/Occipital infarct. 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 Mcnulty, now in MICU  s/p septic shock overnight 9/1, now on meropenem, HDS  s/p trache, on vent support, copious secretions      Renal     Ptn well know to Dr. Colon,following   HD 8/19,  8/21, 8/26 and 8/28.  s/p PUF 8/29 2.5 Liters, HD 8/30,  9/1, 9/4  L IJ HD placed  uremic  hyponatremic  HD as per renal        Hypotension  - Levo drip  prognosis is poor  consider palliative care consult    GI  NPO  on tube feeds  coffee ground emesis x 1 8/24, melena overnight 8/31, s/p 1UPRBC, EGD/Colonoscopy: erosive gastropathy, esophagisits, on colonoscopy old blood seen no active bleeding, poor prep  family to decide re PEG placement    Endocrine  T2DM   A1C 7.1 in 7/2023   - BG goal 140-200     ID   No fever/leukocytosis, recheck temp   Hx latent TB which was treated, no concern for TB   - grew MAC on AFB culture from most recent admission. at [facility]  -MSSA Bacteremia 9/1, allergic to cephalosprins and PCN, on Vanco as per ID, renal dosing,   - sputum also grew E.Coli-ESBL, as per ID recs for  Bradford through 9/7( 1 week course as per ID) , afebrile.  - 9/8:  spike  temp 38.1C, has O. externa, has a wick, recs are to get a CT to R/O an abscess/bony involvement. cont Meropenem  9/9: ptn w fever overnight to 100.8,  may need shiley pulled if bacteremic, needs NECK CT as per ENT to R/O Mastoid bone involvement while having ongoing purulent DC from L ear 2/2 O. externa, getting daily wick changes  9/10:  spiked 102 overnight through Bradford and Vanco, purulent DC from O. externa, ENT recommend Ct of mastoid. ptn in HD, has Shiley in place, consider pulling shiley and send for Cx. would d/w Renal, and consider contacting  ID, last seen by Dr. Conner 9/6 9/11: remains febrile, Dr. Conner reconsulted. cont Bradford, Vanco  9/12: tmax 100.5, fever curve is improving, awaiting Left ear CT, on Bradford since 9/1. on  vanco for MSSA Bacteremia, does not tolerate cephalosporins. through 10/2  9/13: on full vent support via trache, appears uncomfortable and onur 2/2 Left O. externa. has an incidental left middle ear tumor, no acute middle ear interventions recommended, left ear wick replaced. on Meropenem, cx from Ear DC is neg. On Vanco for MSSA bacteremia through 10/2, course of Meropenem as per ID, afebrile in the past 24 hrs . Cardura for HTN resumed, HGB dropped to 6.4, got a dose of EPO and 1UPRBC, rpt 7.8, remains on HEPARIN drip for LEFT popliteal DVT  9/14: cont on Mupirocin locally to Left ear, cont IV Bradford, ENT signed off, afebrile x 48 hrs, Mro course to be determined by ID, on Vanco for MSSA bacteremia through 10/2. ongoing worsening hyponatremia, Hypertonic saline as per renal, no HD today, s/p 1UPRBC 9/13  9/15: spiking temps again, if cont to spike as per ID re PAN scan. cont Vanco for MSSA Bacteremia  9/16-18: no temp overnight  afebrile, cont to have Left ear DC,   on Vanco and ,bradford through 10/2  On IV Fluconazole for fungal cx+ from O. externa Discharge.   fluconazole started 9/21, ptn developed an allergic rash on 9/22. doubt its 2/2 to MEROPENEM or Vanco.  MEropenem was DCed 2/2 causing possible drug rash.  on VANCO based on levels for MSSA bacteremia but DCed 2/2 poss drug rash, now on Daptomycin  9/29: ptn is septic, meropenem resumed, s/p HD 9/28, next HD tomorrow    Heme/Onc  ppx: place on SCD  Transfuse for hgb 6.4, no obv bleed. HDS  LEFT POPLITEAL VEIN ACUTE DVT on 9/10, on Heparin drip    Ethics  GOC - Discussed GOC with daughter and , they have opted in the past for full code. and she remains full code at present      9/27:  In [facility], R IJ HD catheter placed, NGT in place, acidotic on VBG, trache to vent, anasarca, on IV BUMEX, on Levo, on Heparin drip, on stress dose HYdrocortisone, FS in range, uremic, awaiting HD, CT C/A/P w no acute findings. VANCO Dced , now on Dapto, for MSSA baceteremia through 10/2    9/28: HD started 9/27, still encephalopathic, fluconazole DCed as it could be the cause of the rash. on Dapto to complete a course of Abx for MSSA bacteremia through 10/2, VANCO DCed 2/2 possible rash. off pressors    9/30: sepsis resolved, off fluconazole, would add FLUCONAZOLE to the allergy list. on Meropenem, on Dapto through 10/2 instead of Vanco.Vanco was DCed as preseumed ptn had an allergic rash to Vanco. she is not allergic to Vanco. vent dependent. tolerating HD. s/p HD today, next on 10/3    10/1: back in RCU, trach to vent,  tolerating HD well, on Bradford, Dapto, off fluconazole 2/2 allergic rxn    10/2:  trache to vent, completed a course of ABx for MSSA bacteremia TODAY, sputum grew P. aeruginosa resistant to MEROPENEM, ptn was switched to ZOSYN. 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 copious secretions. temp last night. ptn got 2 gm dose of AZACTAM today at 6 pm, ptn seen at 8 pm, has facial swelling and erythema, no stridor, ID made aware. Daughter and  at bedside state the allergic rash post ZOsyn had improved after DC zosyn. last dose of ZOsyn today at 2 pm, prior to that 11 am.   may need to switch to Amikacin if reaction will cont on Azactam. cont Benadryl. rpt Head ct tonight to r/o new CVA. HD as per renal    10/4: HD today, as per renal. no fever overnight, remains on AZTREONAM, rash resolved, next dose tonight    10/5: On aztreonam, afebrile, facial erythema is mild, prob resolving from ZOSYN exposure. on HD as per renal. trache to vent. O. externa resolved. 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 aeruginosa w Interm sens to Aztreonam, now DCed, started on Polymyxin, additional sensitivities being tested as per ID    10/7:  ptn cont to spike temps, FS are above 250, on polymyxin B for P. aeruginosa in sputum, trache to vent, has thick secretions. off eliquis in preparation to convert shiley to permacath in IR on 10/10  10/8: fever ongoing, no further ABx change recommended, ID recs: bronch. is this fever 2/2 worsening BRYAN ???  10/9:  long conversation w daughter and  re hospital course, prognosis and possibility of transfer to [facility]. they are discouraged re the prognosis they are given by Pulm , neuro, ID. they feel she improved post respiratory failure when hospitalized at [facility] in the past. i explained that she is much sicker now, on HD, s/p additional stroke, numerous aspiration PNAs, numerous allergies to Abx, DVT. they are hoping she will get bronchial lavage as recommended by ID

## 2023-10-09 NOTE — PROGRESS NOTE ADULT - SUBJECTIVE AND OBJECTIVE BOX
Overnight events noted      VITAL:  T(C): , Max: 37.4 (10-09-23 @ 01:35)  T(F): , Max: 99.3 (10-09-23 @ 01:35)  HR: 112 (10-09-23 @ 10:55)  BP: 100/29 (10-09-23 @ 10:55)  BP(mean): 50 (10-09-23 @ 10:55)  RR: 20 (10-09-23 @ 10:55)  SpO2: 99% (10-09-23 @ 10:55)  Wt(kg): --      PHYSICAL EXAM:  Constitutional: minimally communicative at present; on vent  HEENT: trach-vent, (+)NGT  Respiratory: coarse BS b/l  Cardiovascular: tachy reg s1s2  Gastrointestinal: BS+, soft, NT/ND  Extremities: + peripheral edema  Neurological: tone WNL  Skin: No rashes  Access: (+)[facility]      LABS:                        8.1    13.81 )-----------( 340      ( 09 Oct 2023 06:25 )             25.8     Na(132)/K(3.9)/Cl(95)/HCO3(24)/BUN(43)/Cr(1.76)Glu(256)/Ca(8.5)/Mg(1.80)/PO4(2.9)    10-09 @ 06:25  Na(135)/K(4.0)/Cl(98)/HCO3(27)/BUN(35)/Cr(1.48)Glu(302)/Ca(8.3)/Mg(1.70)/PO4(2.7)    10-07 @ 21:40  Na(138)/K(3.5)/Cl(102)/HCO3(28)/BUN(32)/Cr(1.18)Glu(173)/Ca(8.3)/Mg(1.80)/PO4(1.7)    10-06 @ 16:40      IMPRESSION: 73F w/ HTN, DM2, CVA, breast CA-bilateral mastectomy, recurrent aspiration pneumonia/respiratory failure, and CKD, 8/11/23 p/w acute hypercapnic respiratory failure; c/b RUSS    (1)Renal - RUSS on CKD4 ==>now newly ESRD. Last dialyzed Friday 10/6; due for next HD today. Potentially for tunneled cath 10/10 with IR    (2)Hyponatremia - will improve with HD    (3)Pulm- vent-dependent    RECOMMEND:  (1)Change tube feeds from Nepro to Glucerna  (2)Tunneled cath placement 10/10/23 per IR  (3)HD today - 3hrs, 2kg UF as able, pressors as needed to keep SBP>90; Epogen with HD  (4)Dose new meds for GFR <10/HD              Jimmy Colon MD  [facility]  Office/on call physician: [phone]  Cell (7a-7p): [phone]       seen on HD   and daughter at bedside      VITAL:  T(C): , Max: 37.4 (10-09-23 @ 01:35)  T(F): , Max: 99.3 (10-09-23 @ 01:35)  HR: 112 (10-09-23 @ 10:55)  BP: 100/29 (10-09-23 @ 10:55)  BP(mean): 50 (10-09-23 @ 10:55)  RR: 20 (10-09-23 @ 10:55)  SpO2: 99% (10-09-23 @ 10:55)  Wt(kg): --      PHYSICAL EXAM:  Constitutional: minimally communicative at present; on vent  HEENT: trach-vent, (+)NGT  Respiratory: coarse BS b/l  Cardiovascular: tachy reg s1s2  Gastrointestinal: BS+, soft, NT/ND  Extremities: + peripheral edema  Neurological: tone WNL  Skin: No rashes  Access: (+)[facility]      LABS:                        8.1    13.81 )-----------( 340      ( 09 Oct 2023 06:25 )             25.8     Na(132)/K(3.9)/Cl(95)/HCO3(24)/BUN(43)/Cr(1.76)Glu(256)/Ca(8.5)/Mg(1.80)/PO4(2.9)    10-09 @ 06:25  Na(135)/K(4.0)/Cl(98)/HCO3(27)/BUN(35)/Cr(1.48)Glu(302)/Ca(8.3)/Mg(1.70)/PO4(2.7)    10-07 @ 21:40  Na(138)/K(3.5)/Cl(102)/HCO3(28)/BUN(32)/Cr(1.18)Glu(173)/Ca(8.3)/Mg(1.80)/PO4(1.7)    10-06 @ 16:40      IMPRESSION: 73F w/ HTN, DM2, CVA, breast CA-bilateral mastectomy, recurrent aspiration pneumonia/respiratory failure, and CKD, 8/11/23 p/w acute hypercapnic respiratory failure; c/b RUSS    (1)Renal - RUSS on CKD4 ==>now newly ESRD. Last dialyzed Friday 10/6; due for next HD today. Potentially for tunneled cath 10/10 with IR    (2)Hyponatremia - will improve with HD    (3)Hypokalemia/Hypophosphatemia - numbers likely to dip further with HD today- we should change the tube feeds to Glucerna at this point    (4)CV- tenuous hemodynamics such with each passing week we have more difficulty performing HD/achieving UF. Provided that we continue with aggressive life-sustaining therapy, at some point soon we will need to have her on pressor gtts to allow her to tolerate HD    (5)Pulm- vent-dependent    RECOMMEND:  (1)HD today - 3hrs, 2kg UF as able, pressors and sodium modelling as needed to keep SBP>90; Epogen with HD  (2)Change tube feeds from Nepro to Glucerna  (3)Tunneled cath placement if/when clinically optimized for the procedure  (4)Dose new meds for GFR <10/HD              Jimmy Colon MD  [facility]  Office/on call physician: [phone]  Cell (7a-7p): [phone]       seen on HD   and daughter at bedside      VITAL:  T(C): , Max: 37.4 (10-09-23 @ 01:35)  T(F): , Max: 99.3 (10-09-23 @ 01:35)  HR: 112 (10-09-23 @ 10:55)  BP: 100/29 (10-09-23 @ 10:55)  BP(mean): 50 (10-09-23 @ 10:55)  RR: 20 (10-09-23 @ 10:55)  SpO2: 99% (10-09-23 @ 10:55)  Wt(kg): --      PHYSICAL EXAM:  Constitutional: minimally communicative at present; on vent  HEENT: trach-vent, (+)NGT  Respiratory: coarse BS b/l  Cardiovascular: tachy reg s1s2  Gastrointestinal: BS+, soft, NT/ND  Extremities: + peripheral edema  Neurological: tone WNL  Skin: No rashes  Access: (+)[facility]      LABS:                        8.1    13.81 )-----------( 340      ( 09 Oct 2023 06:25 )             25.8     Na(132)/K(3.9)/Cl(95)/HCO3(24)/BUN(43)/Cr(1.76)Glu(256)/Ca(8.5)/Mg(1.80)/PO4(2.9)    10-09 @ 06:25  Na(135)/K(4.0)/Cl(98)/HCO3(27)/BUN(35)/Cr(1.48)Glu(302)/Ca(8.3)/Mg(1.70)/PO4(2.7)    10-07 @ 21:40  Na(138)/K(3.5)/Cl(102)/HCO3(28)/BUN(32)/Cr(1.18)Glu(173)/Ca(8.3)/Mg(1.80)/PO4(1.7)    10-06 @ 16:40      IMPRESSION: 73F w/ HTN, DM2, CVA, breast CA-bilateral mastectomy, recurrent aspiration pneumonia/respiratory failure, and CKD, 8/11/23 p/w acute hypercapnic respiratory failure; c/b RUSS    (1)Renal - RUSS on CKD4 ==>now newly ESRD. Last dialyzed Friday 10/6; due for next HD today. Potentially for tunneled cath 10/10 with IR    (2)Hyponatremia - will improve with HD    (3)Hypokalemia/Hypophosphatemia - numbers likely to dip further with HD today- we should change the tube feeds to Glucerna at this point    (4)CV- tenuous hemodynamics such with each passing week we have more difficulty performing HD/achieving UF. Provided that we continue with aggressive life-sustaining therapy, at some point soon we will need to have her on pressor gtts to allow her to tolerate HD    (5)Pulm- vent-dependent    RECOMMEND:  (1)HD today - 3hrs, 2kg UF as able, pressors and sodium modelling as needed to keep SBP>90; Epogen with HD  (2)Change tube feeds from Nepro to Glucerna  (3)Tunneled cath placement if/when clinically optimized for the procedure  (4)Dose new meds for GFR <10/HD      Nilda Espinoza NP    *******************RENAL ATTENDING**********************  Seen/examined with NP. I agree with NP assessment as above.    VS as above; NAD; CTA-B/L; RRR; no edema b/l            Jimmy Colon MD  [facility]  Office/on call physician: [phone]  Cell (7a-7p): [phone]

## 2023-10-09 NOTE — PROGRESS NOTE ADULT - SUBJECTIVE AND OBJECTIVE BOX
Follow Up:  MSSA bacteremia    Interval History: tmax 100.1, WBC down to 13, getting HD today      Allergies  isoniazid (Rash)  nafcillin (Unknown)  hydrALAZINE (Rash)  vitamin E (Short breath; Urticaria; Hives)  doxycycline (Rash)  cefepime (Rash)  NIFEdipine (Urticaria; Hives)        ANTIMICROBIALS:  polymyxin B IVPB 242603 every 12 hours      OTHER MEDS:  acetaminophen   Oral Liquid .. 650 milliGRAM(s) Oral every 6 hours PRN  albuterol    0.083% 2.5 milliGRAM(s) Nebulizer every 6 hours  artificial tears (preservative free) Ophthalmic Solution 1 Drop(s) Both EYES every 4 hours  aspirin  chewable 81 milliGRAM(s) Enteral Tube daily  atorvastatin 10 milliGRAM(s) Oral at bedtime  calamine/zinc oxide Lotion 1 Application(s) Topical two times a day PRN  chlorhexidine 0.12% Liquid 15 milliLiter(s) Oral Mucosa every 12 hours  chlorhexidine 2% Cloths 1 Application(s) Topical daily  dextrose 5%. 1000 milliLiter(s) IV Continuous <Continuous>  dextrose 5%. 1000 milliLiter(s) IV Continuous <Continuous>  dextrose 50% Injectable 12.5 Gram(s) IV Push once  dextrose 50% Injectable 25 Gram(s) IV Push once  dextrose 50% Injectable 25 Gram(s) IV Push once  dextrose 50% Injectable 25 Gram(s) IV Push once  dextrose Oral Gel 15 Gram(s) Oral once PRN  epoetin odalis (EPOGEN) Injectable 72436 Unit(s) IV Push <User Schedule>  ferrous    sulfate Liquid 300 milliGRAM(s) Oral daily  glucagon  Injectable 1 milliGRAM(s) IntraMuscular once  insulin lispro (ADMELOG) corrective regimen sliding scale   SubCutaneous every 6 hours  insulin NPH human recombinant 6 Unit(s) SubCutaneous every 6 hours  levETIRAcetam  Solution 1000 milliGRAM(s) Oral daily  levETIRAcetam  Solution 250 milliGRAM(s) Oral <User Schedule>  midodrine. 30 milliGRAM(s) Oral every 8 hours  pantoprazole  Injectable 40 milliGRAM(s) IV Push two times a day  petrolatum Ophthalmic Ointment 1 Application(s) Both EYES two times a day  sodium chloride 1 Gram(s) Oral two times a day  sodium chloride 0.9% lock flush 10 milliLiter(s) IV Push every 1 hour PRN      Vital Signs Last 24 Hrs  T(C): 37.4 (09 Oct 2023 11:55), Max: 37.4 (09 Oct 2023 01:35)  T(F): 99.4 (09 Oct 2023 11:55), Max: 99.4 (09 Oct 2023 11:55)  HR: 107 (09 Oct 2023 11:55) (82 - 114)  BP: 98/62 (09 Oct 2023 11:55) (94/35 - 105/52)  BP(mean): 50 (09 Oct 2023 10:55) (50 - 50)  RR: 23 (09 Oct 2023 11:55) (20 - 23)  SpO2: 96% (09 Oct 2023 11:55) (96% - 100%)    Parameters below as of 09 Oct 2023 11:55  Patient On (Oxygen Delivery Method): ventilator        Physical Exam:  General:    trached   Respiratory:   trach on vent  abd:  distended but soft  :  no  willard  Musculoskeletal : generalized edema  Skin: no visible rash not some erythema on legs  vascular: TRISHA odom                               8.1    13.81 )-----------( 340      ( 09 Oct 2023 06:25 )             25.8       10-09    132<L>  |  95<L>  |  43<H>  ----------------------------<  256<H>  3.9   |  24  |  1.76<H>    Ca    8.5      09 Oct 2023 06:25  Phos  2.9     10-09  Mg     1.80     10-09    TPro  5.6<L>  /  Alb  2.0<L>  /  TBili  0.3  /  DBili  <0.2  /  AST  52<H>  /  ALT  30  /  AlkPhos  330<H>  10-09      Urinalysis Basic - ( 09 Oct 2023 06:25 )    Color: x / Appearance: x / SG: x / pH: x  Gluc: 256 mg/dL / Ketone: x  / Bili: x / Urobili: x   Blood: x / Protein: x / Nitrite: x   Leuk Esterase: x / RBC: x / WBC x   Sq Epi: x / Non Sq Epi: x / Bacteria: x        MICROBIOLOGY:  v  Trach Asp Tracheal Aspirate  10-03-23   Numerous Pseudomonas aeruginosa (Carbapenem Resistant) Susceptibility to  follow.  Normal Respiratory Cate absent  --  Pseudomonas aeruginosa (Carbapenem Resistant)      .Blood Blood-Venous  09-29-23   No growth at 5 days  --  --      .Blood Blood-Peripheral  09-29-23   No growth at 5 days  --  --      ET Tube ET Tube  09-29-23   Rare Yeast  --  --      ET Tube ET Tube  09-29-23   Numerous Proteus mirabilis  Moderate Pseudomonas aeruginosa (Carbapenem Resistant)  Normal Respiratory Cate absent  --  Proteus mirabilis  Pseudomonas aeruginosa (Carbapenem Resistant)      Clean Catch Clean Catch (Midstream)  09-26-23   10,000 - 49,000 CFU/mL Candida albicans "Susceptibilities not performed"  --  --      .Blood Blood-Peripheral  09-26-23   No growth at 5 days  --  --      .Blood Blood-Venous  09-20-23   No growth at 5 days  --  --      .Blood Blood-Peripheral  09-20-23   No growth at 5 days  --  --      Ear Ear  09-13-23   Moderate Candida albicans "Susceptibilities not performed"  --  --      .Blood Blood-Venous  09-10-23   No growth at 5 days  --  --      .Sputum Sputum  09-10-23   Normal Respiratory Cate present  --    Rare polymorphonuclear leukocytes per low power field  No Squamous epithelial cells per low power field  Rare Yeast like cells seen per oil power field  Rare Gram Positive Cocci in Clusters seen per oil power field      .Blood Blood-Peripheral  09-10-23   No growth at 5 days  --  --                RADIOLOGY:  Images independently visualized and reviewed personally, findings as below  < from: Xray Chest 1 View- PORTABLE-Urgent (Xray Chest 1 View- PORTABLE-Urgent .) (10.08.23 @ 10:41) >  IMPRESSION:  Slightly improved pulmonary edema.    < end of copied text >  < from: CT Head No Cont (10.03.23 @ 20:46) >    No acute intracranial hemorrhage or mass effect. Evaluation of the   posterior fossa degraded by streak artifact from dental amalgam.   Lucencies in the bilateral cerebellum may be artifactual.    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    Bilateral middle ear and mastoid effusions which are also seen on prior   exam.    < end of copied text >

## 2023-10-09 NOTE — PROGRESS NOTE ADULT - SUBJECTIVE AND OBJECTIVE BOX
CHIEF COMPLAINT: Patient is a 73y old  Female who presents with a chief complaint of Respiratory distress (08 Oct 2023 22:07)      INTERVAL EVENTS:  - TMAX 100.2 overnight and pending RPT BCx (sent this morning).   - PIPs remains high second to volume and secretions and plan for HD today   - Planned for IR permacath but may need to hold pending BCx     REVIEW OF SYSTEMS: Seen by bedside during AM rounds and unable to assess ROS as altered and nonverbal on vent     Mode: AC/ CMV (Assist Control/ Continuous Mandatory Ventilation), RR (machine): 22, TV (machine): 340, FiO2: 35, PEEP: 8, ITime: 0.8, MAP: 19, PIP: 52      OBJECTIVE:  ICU Vital Signs Last 24 Hrs  T(C): 37.3 (09 Oct 2023 06:05), Max: 37.8 (08 Oct 2023 08:58)  T(F): 99.1 (09 Oct 2023 06:05), Max: 100.1 (08 Oct 2023 08:58)  HR: 108 (09 Oct 2023 07:13) (82 - 114)  BP: 94/35 (09 Oct 2023 06:05) (90/35 - 114/40)  BP(mean): 51 (08 Oct 2023 13:22) (51 - 58)  ABP: --  ABP(mean): --  RR: 22 (09 Oct 2023 06:05) (20 - 22)  SpO2: 97% (09 Oct 2023 07:13) (97% - 100%)    O2 Parameters below as of 09 Oct 2023 06:05  Patient On (Oxygen Delivery Method): ventilator    O2 Concentration (%): 35      Mode: AC/ CMV (Assist Control/ Continuous Mandatory Ventilation), RR (machine): 22, TV (machine): 340, FiO2: 35, PEEP: 8, ITime: 0.8, MAP: 19, PIP: 52    10-08 @ 07:01  -  10-09 @ 07:00  --------------------------------------------------------  IN: 480 mL / OUT: 0 mL / NET: 480 mL      CAPILLARY BLOOD GLUCOSE  POCT Blood Glucose.: 260 mg/dL (09 Oct 2023 06:19)      HOSPITAL MEDICATIONS:  MEDICATIONS  (STANDING):  albuterol    0.083% 2.5 milliGRAM(s) Nebulizer every 6 hours  artificial tears (preservative free) Ophthalmic Solution 1 Drop(s) Both EYES every 4 hours  aspirin  chewable 81 milliGRAM(s) Enteral Tube daily  atorvastatin 10 milliGRAM(s) Oral at bedtime  chlorhexidine 0.12% Liquid 15 milliLiter(s) Oral Mucosa every 12 hours  chlorhexidine 2% Cloths 1 Application(s) Topical daily  dextrose 5%. 1000 milliLiter(s) (50 mL/Hr) IV Continuous <Continuous>  dextrose 5%. 1000 milliLiter(s) (100 mL/Hr) IV Continuous <Continuous>  dextrose 50% Injectable 12.5 Gram(s) IV Push once  dextrose 50% Injectable 25 Gram(s) IV Push once  dextrose 50% Injectable 25 Gram(s) IV Push once  dextrose 50% Injectable 25 Gram(s) IV Push once  epoetin odalis (EPOGEN) Injectable 96180 Unit(s) IV Push <User Schedule>  ferrous    sulfate Liquid 300 milliGRAM(s) Oral daily  glucagon  Injectable 1 milliGRAM(s) IntraMuscular once  insulin lispro (ADMELOG) corrective regimen sliding scale   SubCutaneous every 6 hours  insulin NPH human recombinant 6 Unit(s) SubCutaneous every 6 hours  levETIRAcetam  Solution 1000 milliGRAM(s) Oral daily  levETIRAcetam  Solution 250 milliGRAM(s) Oral <User Schedule>  melatonin 3 milliGRAM(s) Oral at bedtime  midodrine. 30 milliGRAM(s) Oral every 8 hours  pantoprazole  Injectable 40 milliGRAM(s) IV Push two times a day  petrolatum Ophthalmic Ointment 1 Application(s) Both EYES two times a day  polymyxin B IVPB 842114 Unit(s) IV Intermittent every 12 hours  sodium chloride 1 Gram(s) Oral two times a day  sodium chloride 3%  Inhalation 4 milliLiter(s) Inhalation every 6 hours    MEDICATIONS  (PRN):  acetaminophen   Oral Liquid .. 650 milliGRAM(s) Oral every 6 hours PRN Temp greater or equal to 38C (100.4F), Mild Pain (1 - 3)  calamine/zinc oxide Lotion 1 Application(s) Topical two times a day PRN Rash and/or Itching  dextrose Oral Gel 15 Gram(s) Oral once PRN Blood Glucose LESS THAN 70 milliGRAM(s)/deciliter  sodium chloride 0.9% lock flush 10 milliLiter(s) IV Push every 1 hour PRN Pre/post blood products, medications, blood draw, and to maintain line patency      PHYSICAL EXAMINATION    LABS:                        8.1    13.81 )-----------( 340      ( 09 Oct 2023 06:25 )             25.8     10-07    135  |  98  |  35<H>  ----------------------------<  302<H>  4.0   |  27  |  1.48<H>    Ca    8.3<L>      07 Oct 2023 21:40  Phos  2.7     10-07  Mg     1.70     10-07        Urinalysis Basic - ( 07 Oct 2023 21:40 )  Color: x / Appearance: x / SG: x / pH: x  Gluc: 302 mg/dL / Ketone: x  / Bili: x / Urobili: x   Blood: x / Protein: x / Nitrite: x   Leuk Esterase: x / RBC: x / WBC x   Sq Epi: x / Non Sq Epi: x / Bacteria: x            PAST MEDICAL & SURGICAL HISTORY:  Breast CA      Diabetes      Stroke      Cardiac arrest      HTN (hypertension)      H/O mastectomy, bilateral          FAMILY HISTORY:  No pertinent family history in first degree relatives        Social History:      RADIOLOGY:  [ ] Reviewed and interpreted by me    PULMONARY FUNCTION TESTS:    EKG: CHIEF COMPLAINT: Patient is a 73y old  Female who presents with a chief complaint of Respiratory distress (08 Oct 2023 22:07)      INTERVAL EVENTS:  - TMAX 100.2 overnight and pending RPT BCx (sent this morning).   - PIPs remains high second to volume and secretions and plan for HD today, however concern noted for tolerance given soft BPs on Midodrine 30.   - Planned for IR permacath but will hold pending BCx and infectious improvement.   - Attempted family meeting however  and daughter appear frustrated with current conditions and does not wish for palliative conditions.     REVIEW OF SYSTEMS: Seen by bedside during AM rounds and unable to assess ROS as altered and nonverbal on vent     Mode: AC/ CMV (Assist Control/ Continuous Mandatory Ventilation), RR (machine): 22, TV (machine): 340, FiO2: 35, PEEP: 8, ITime: 0.8, MAP: 19, PIP: 52      OBJECTIVE:  ICU Vital Signs Last 24 Hrs  T(C): 37.3 (09 Oct 2023 06:05), Max: 37.8 (08 Oct 2023 08:58)  T(F): 99.1 (09 Oct 2023 06:05), Max: 100.1 (08 Oct 2023 08:58)  HR: 108 (09 Oct 2023 07:13) (82 - 114)  BP: 94/35 (09 Oct 2023 06:05) (90/35 - 114/40)  BP(mean): 51 (08 Oct 2023 13:22) (51 - 58)  ABP: --  ABP(mean): --  RR: 22 (09 Oct 2023 06:05) (20 - 22)  SpO2: 97% (09 Oct 2023 07:13) (97% - 100%)    O2 Parameters below as of 09 Oct 2023 06:05  Patient On (Oxygen Delivery Method): ventilator    O2 Concentration (%): 35      Mode: AC/ CMV (Assist Control/ Continuous Mandatory Ventilation), RR (machine): 22, TV (machine): 340, FiO2: 35, PEEP: 8, ITime: 0.8, MAP: 19, PIP: 52    10-08 @ 07:01  -  10-09 @ 07:00  --------------------------------------------------------  IN: 480 mL / OUT: 0 mL / NET: 480 mL      CAPILLARY BLOOD GLUCOSE  POCT Blood Glucose.: 260 mg/dL (09 Oct 2023 06:19)      HOSPITAL MEDICATIONS:  MEDICATIONS  (STANDING):  albuterol    0.083% 2.5 milliGRAM(s) Nebulizer every 6 hours  artificial tears (preservative free) Ophthalmic Solution 1 Drop(s) Both EYES every 4 hours  aspirin  chewable 81 milliGRAM(s) Enteral Tube daily  atorvastatin 10 milliGRAM(s) Oral at bedtime  chlorhexidine 0.12% Liquid 15 milliLiter(s) Oral Mucosa every 12 hours  chlorhexidine 2% Cloths 1 Application(s) Topical daily  dextrose 5%. 1000 milliLiter(s) (50 mL/Hr) IV Continuous <Continuous>  dextrose 5%. 1000 milliLiter(s) (100 mL/Hr) IV Continuous <Continuous>  dextrose 50% Injectable 12.5 Gram(s) IV Push once  dextrose 50% Injectable 25 Gram(s) IV Push once  dextrose 50% Injectable 25 Gram(s) IV Push once  dextrose 50% Injectable 25 Gram(s) IV Push once  epoetin odalis (EPOGEN) Injectable 78059 Unit(s) IV Push <User Schedule>  ferrous    sulfate Liquid 300 milliGRAM(s) Oral daily  glucagon  Injectable 1 milliGRAM(s) IntraMuscular once  insulin lispro (ADMELOG) corrective regimen sliding scale   SubCutaneous every 6 hours  insulin NPH human recombinant 6 Unit(s) SubCutaneous every 6 hours  levETIRAcetam  Solution 1000 milliGRAM(s) Oral daily  levETIRAcetam  Solution 250 milliGRAM(s) Oral <User Schedule>  melatonin 3 milliGRAM(s) Oral at bedtime  midodrine. 30 milliGRAM(s) Oral every 8 hours  pantoprazole  Injectable 40 milliGRAM(s) IV Push two times a day  petrolatum Ophthalmic Ointment 1 Application(s) Both EYES two times a day  polymyxin B IVPB 907277 Unit(s) IV Intermittent every 12 hours  sodium chloride 1 Gram(s) Oral two times a day  sodium chloride 3%  Inhalation 4 milliLiter(s) Inhalation every 6 hours    MEDICATIONS  (PRN):  acetaminophen   Oral Liquid .. 650 milliGRAM(s) Oral every 6 hours PRN Temp greater or equal to 38C (100.4F), Mild Pain (1 - 3)  calamine/zinc oxide Lotion 1 Application(s) Topical two times a day PRN Rash and/or Itching  dextrose Oral Gel 15 Gram(s) Oral once PRN Blood Glucose LESS THAN 70 milliGRAM(s)/deciliter  sodium chloride 0.9% lock flush 10 milliLiter(s) IV Push every 1 hour PRN Pre/post blood products, medications, blood draw, and to maintain line patency      PHYSICAL EXAMINATION  General: NAD   HEENT: Trach present with NGT . Left ear and left eye appears improved.   Cards: S1/S2, no murmurs   Pulm: Course vent sounds bilaterally with crackles at bases and mild rhonchi cleared upon suctioning. No wheezes.   Abdomen: Soft, NTND. BS (+)   Extremities: No pedal edema. No active ROM x 4  Neurology: Eyes closed, does not follow commands, only grimaces to pain, with no focal neurological deficits     LABS:                        8.1    13.81 )-----------( 340      ( 09 Oct 2023 06:25 )             25.8     10-07    135  |  98  |  35<H>  ----------------------------<  302<H>  4.0   |  27  |  1.48<H>    Ca    8.3<L>      07 Oct 2023 21:40  Phos  2.7     10-07  Mg     1.70     10-07        Urinalysis Basic - ( 07 Oct 2023 21:40 )  Color: x / Appearance: x / SG: x / pH: x  Gluc: 302 mg/dL / Ketone: x  / Bili: x / Urobili: x   Blood: x / Protein: x / Nitrite: x   Leuk Esterase: x / RBC: x / WBC x   Sq Epi: x / Non Sq Epi: x / Bacteria: x            PAST MEDICAL & SURGICAL HISTORY:  Breast CA      Diabetes      Stroke      Cardiac arrest      HTN (hypertension)      H/O mastectomy, bilateral          FAMILY HISTORY:  No pertinent family history in first degree relatives        Social History:      RADIOLOGY:  [ ] Reviewed and interpreted by me    PULMONARY FUNCTION TESTS:    EKG:

## 2023-10-09 NOTE — CHART NOTE - NSCHARTNOTEFT_GEN_A_CORE
RCU PA NOTE     Called by RN for patient with hypotension with SBP 70s and MAP 40s. Seen by bedside however baseline mentation poor and extremities with 2+ edema and unable to assess for signs of poor perfusion. Patient continued on Midodrine 30 TID and noted to be net negative 1.7L post HD today. Overall patient grossly fluid overloaded with third spacing and decided for oncotic resuscitation. BP slightly improved ot 94/20 with MAP 48 however with significant change in MAP MICU called. Will attempt further fluid rehydration and MICU to follow overnight.     ANNA McdowellC  Department of Medicine/ RCU  In house RCU [facility] [id number]  In house Medicine [facility] [phone]  Reachable via teams RCU PA NOTE     Called by RN for patient with hypotension with SBP 70s and MAP 40s. Seen by bedside however baseline mentation poor and extremities with 2+ edema and unable to assess for signs of poor perfusion. Patient continued on Midodrine 30 TID and noted to be net negative 1.7L post HD today. Overall patient grossly fluid overloaded with third spacing and decided for oncotic resuscitation. BP slightly improved ot 94/20 with MAP 48 however with significant change in MAP MICU called. IF BP remains unstable will attempt further fluid rehydration. No immediate need for MICU transfer per MICU and MICU to follow along overnight.     ANNA McdowellC  Department of Medicine/ RCU  In house RCU [facility] [id number]  In house Medicine [facility] [phone]  Reachable via teams RCU PA NOTE     Called by RN for patient with hypotension with SBP 70s and MAP 40s. Seen by bedside however baseline mentation poor and extremities with 2+ edema and unable to assess for signs of poor perfusion. Patient continued on Midodrine 30 TID and noted to be net negative 1.7L post HD today. Overall patient grossly fluid overloaded with third spacing and decided for oncotic resuscitation. BP slightly improved ot 94/20 with MAP 48 however with significant change in MAP MICU called. IF BP remains unstable will attempt further fluid rehydration. No immediate need for MICU transfer per MICU and MICU to follow along overnight.  by bedside and explained concerns of decline. Comprehension expressed and continues to wish for all aggressive measures.  emotional and supportive care provided.     BETTINA Mcdowell-C  Department of Medicine/ RCU  In house RCU [facility] [id number]  In house Medicine [facility] [id number]  Reachable via teams

## 2023-10-09 NOTE — PROGRESS NOTE ADULT - PROBLEM SELECTOR PLAN 1
Multifactorial     - Aspiration, acute on chronic diastolic CHF   s/p trach 9/1  now with likely sepsis   restarted Abx  Prognosis very poor. Discussed with abdias at bedside

## 2023-10-09 NOTE — GOALS OF CARE CONVERSATION - ADVANCED CARE PLANNING - CONVERSATION DETAILS
Ms. 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 While in MICU patient noted with worsening renal failure requiring HD initiation, CGE/ Melena s/p EGD 8/31 found with esophagitis and erosive gastropathy, new right cerebellar infarct and fevers second to ESBL COLI and MSSA VAP, MSSA bacteremia, left ear otitis externa and drug rxn to cephalosporins. 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 Case discussed at length renal and given good UOP attempted renal recovery, however failed, and upon reinitiation of HD R IJ SHILEY failed. Attempted volume removal with Bumex GTT however course complicated by hypotension requiring transfer back to MICU 9/26 for pressor support. Diuresis dc'ed, pressors weaned off and stress steroids started. L IJ SHILEY placed (9/30) and HD reinstated. Course further complicated by AOCD and  PSA and Proteus VAP. Patient transferred back to RCU 10/1 with course complicated by volume overload and grossly resistant organisms with difficult ABX selection given resistance vs allergy. Ms. 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     While in MICU patient noted with worsening renal failure requiring HD initiation, CGE/ Melena second to esophagitis and erosive gastropathy, new RIGHT cerebellar infarct and fevers second to ESBL COLI and MSSA VAP, MSSA bacteremia and left ear otitis externa. Course further complicated by  drug rxn to cephalosporins abd prolonged vent time s/p tracheostomy 9/1 and transferred to RCU 9/3     While in RCU, course complicated by  progressive left ear OM and left eye conjunctivitis vs uveitis, recurrent fevers, high PIPs with hypervolemia, mucous plug and tracheomalacia with dynamic collapse. Patient also noted with persistent hypercarbia with tracheomalacia and CO2 improved with increasing PEEP, however high PIPs remained. Case discussed at length with renal and given good UOP attempted renal recovery, however failed, and upon reinitiation of HD R IJ SHILEY failed. Attempted volume removal with Bumex GTT however course complicated by hypotension requiring transfer back to MICU 9/26     While in MICU, pressor support continued and Diuresis dc'ed, L IJ SHILEY placed (9/30) and HD reinstated with improvement in uremia. Course further complicated by septic shock  PSA and Proteus VAP. Stress steroids and Midodrine started with ability to wean off pressors.     Patient transferred back to RCU 10/1 and stress dose steroids weaned off, however recurrent fevers returned and repeat sputum cultures noted with grossly resistant  PSA and Proteus with difficult ABX selection given resistance vs allergy to PCN and Cephalosporins. Case discussed at length with ID and polymyxin initiated however fevers continued and polymyxin deemed as intermediate sensitivity to organisms. Course further complicated by progressive shock state and difficulty with volume removal with HD.     Case discussed with Son and Daughter today and discussed case with RCU attending and Nurse Manager. Expressed concerns for overall poor prognosis given limitations on treating current infection with high likelihood of progression of multi-organ failure. Family expressed comprehension of extent of patient's illness however does not want to lose hope. Family wishes for patient to remain FULL CODE and for continuation of aggressive medical treatment, including but not limited to pressor support and resuscitation if needed.     Alexey Candelario PA-C  Department of Medicine/ RCU  In house RCU [facility] [id number]  In [place] [facility] [id number]  Reachable via teams

## 2023-10-09 NOTE — CONSULT NOTE ADULT - ASSESSMENT
SYNTHESIS  Ms. 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    She is grossly volume overloaded still; however, seems most significant extravascularly. MICU consulted for consideration of management for kira-dialyseal hypotension. Pt assessed at bedside with SBP high 90s with low MAP being driven primarily by quite low diastolic. SBP essentially at gal for someone with severe HF. Pt not exhibiting other signs of shock.     RECOMMENDATIONS  - Maintain SBP Goal > 90  - Agree with  SYNTHESIS  Ms. 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    She is grossly volume overloaded still; however, seems most significant extravascularly. MICU consulted for consideration of management for kira-dialyseal hypotension. Pt assessed at bedside with SBP high 90s with low MAP being driven primarily by quite low diastolic. SBP essentially at gal for someone with severe HF. Pt not exhibiting other signs of shock.     RECOMMENDATIONS  - Maintain SBP Goal > 90  - Agree with gentle expansion of intravascular volume PRN  - Currently not a MICU candidate, MICU To follow overnight    Yossi Lim MD PGY-3 SYNTHESIS  Ms. 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    She is grossly volume overloaded still; however, seems most significant extravascularly. MICU consulted for consideration of management for kira-dialyseal hypotension. Pt assessed at bedside with SBP high 90s with low MAP being driven primarily by quite low diastolic. SBP essentially at gal for someone with severe HF. Pt not exhibiting other signs of shock.     RECOMMENDATIONS  - Maintain SBP Goal > 90  - Agree with gentle expansion of intravascular volume PRN  - Could consider monitoring other perfusion markers when labs obtained (e.g. lactates), but unclear if this would   - Currently not a MICU candidate, MICU To follow overnight    Yossi Lim MD PGY-3

## 2023-10-09 NOTE — PROGRESS NOTE ADULT - SUBJECTIVE AND OBJECTIVE BOX
Subjective: Patient seen and examined. No new events except as noted.   remains in RCU    at bedside   Asking pt be started on pressors and be moved back to ICU     REVIEW OF SYSTEMS:  Unable to obtain     MEDICATIONS:  MEDICATIONS  (STANDING):  albuterol    0.083% 2.5 milliGRAM(s) Nebulizer every 6 hours  artificial tears (preservative free) Ophthalmic Solution 1 Drop(s) Both EYES every 4 hours  aspirin  chewable 81 milliGRAM(s) Enteral Tube daily  atorvastatin 10 milliGRAM(s) Oral at bedtime  chlorhexidine 0.12% Liquid 15 milliLiter(s) Oral Mucosa every 12 hours  chlorhexidine 2% Cloths 1 Application(s) Topical daily  dextrose 5%. 1000 milliLiter(s) (100 mL/Hr) IV Continuous <Continuous>  dextrose 5%. 1000 milliLiter(s) (50 mL/Hr) IV Continuous <Continuous>  dextrose 50% Injectable 12.5 Gram(s) IV Push once  dextrose 50% Injectable 25 Gram(s) IV Push once  dextrose 50% Injectable 25 Gram(s) IV Push once  dextrose 50% Injectable 25 Gram(s) IV Push once  epoetin odalis (EPOGEN) Injectable 27160 Unit(s) IV Push <User Schedule>  ferrous    sulfate Liquid 300 milliGRAM(s) Oral daily  glucagon  Injectable 1 milliGRAM(s) IntraMuscular once  insulin lispro (ADMELOG) corrective regimen sliding scale   SubCutaneous every 6 hours  insulin NPH human recombinant 6 Unit(s) SubCutaneous every 6 hours  levETIRAcetam  Solution 1000 milliGRAM(s) Oral daily  levETIRAcetam  Solution 250 milliGRAM(s) Oral <User Schedule>  melatonin 3 milliGRAM(s) Oral at bedtime  midodrine. 30 milliGRAM(s) Oral every 8 hours  pantoprazole  Injectable 40 milliGRAM(s) IV Push two times a day  petrolatum Ophthalmic Ointment 1 Application(s) Both EYES two times a day  polymyxin B IVPB 576024 Unit(s) IV Intermittent every 12 hours  sodium chloride 1 Gram(s) Oral two times a day  sodium chloride 3%  Inhalation 4 milliLiter(s) Inhalation every 6 hours      PHYSICAL EXAM:  T(C): 37.3 (10-09-23 @ 06:05), Max: 37.4 (10-09-23 @ 01:35)  HR: 108 (10-09-23 @ 07:13) (82 - 114)  BP: 94/35 (10-09-23 @ 06:05) (90/35 - 105/52)  RR: 22 (10-09-23 @ 06:05) (20 - 22)  SpO2: 97% (10-09-23 @ 07:13) (97% - 100%)  Wt(kg): --  I&O's Summary    08 Oct 2023 07:01  -  09 Oct 2023 07:00  --------------------------------------------------------  IN: 480 mL / OUT: 0 mL / NET: 480 mL            Appearance: NAD, +trach  HEENT: dry oral mucosa  Lymphatic: No lymphadenopathy  Cardiovascular: Normal S1 S2, No JVD, No murmurs, No edema  Respiratory: Decreased BS, + trach to vent	  Neuro: opens eyes  Gastrointestinal: Soft, Non-tender, + BS, + NGT  Skin: No rashes, No ecchymoses, No cyanosis	  Extremities: No strength/ROM 2/2 sedation, + BL LE edema  Vascular: Peripheral pulses palpable 2+ bilaterally          LABS:    CARDIAC MARKERS:                                8.1    13.81 )-----------( 340      ( 09 Oct 2023 06:25 )             25.8     10-09    132<L>  |  95<L>  |  43<H>  ----------------------------<  256<H>  3.9   |  24  |  1.76<H>    Ca    8.5      09 Oct 2023 06:25  Phos  2.9     10-09  Mg     1.80     10-09      proBNP:   Lipid Profile:   HgA1c:   TSH:             TELEMETRY: 	    ECG:  	  RADIOLOGY:   DIAGNOSTIC TESTING:  [ ] Echocardiogram:  [ ]  Catheterization:  [ ] Stress Test:    OTHER:

## 2023-10-09 NOTE — PROGRESS NOTE ADULT - SUBJECTIVE AND OBJECTIVE BOX
S: No overnight events. Pt seen. Hypotensive this afternoon      Medications: MEDICATIONS  (STANDING):  albuterol    0.083% 2.5 milliGRAM(s) Nebulizer every 6 hours  artificial tears (preservative free) Ophthalmic Solution 1 Drop(s) Both EYES every 4 hours  aspirin  chewable 81 milliGRAM(s) Enteral Tube daily  atorvastatin 10 milliGRAM(s) Oral at bedtime  chlorhexidine 0.12% Liquid 15 milliLiter(s) Oral Mucosa every 12 hours  chlorhexidine 2% Cloths 1 Application(s) Topical daily  dextrose 5%. 1000 milliLiter(s) (50 mL/Hr) IV Continuous <Continuous>  dextrose 5%. 1000 milliLiter(s) (100 mL/Hr) IV Continuous <Continuous>  dextrose 50% Injectable 25 Gram(s) IV Push once  dextrose 50% Injectable 12.5 Gram(s) IV Push once  dextrose 50% Injectable 25 Gram(s) IV Push once  dextrose 50% Injectable 25 Gram(s) IV Push once  epoetin odalis (EPOGEN) Injectable 32775 Unit(s) IV Push <User Schedule>  ferrous    sulfate Liquid 300 milliGRAM(s) Oral daily  glucagon  Injectable 1 milliGRAM(s) IntraMuscular once  insulin lispro (ADMELOG) corrective regimen sliding scale   SubCutaneous every 6 hours  insulin NPH human recombinant 6 Unit(s) SubCutaneous every 6 hours  levETIRAcetam  Solution 1000 milliGRAM(s) Oral daily  levETIRAcetam  Solution 250 milliGRAM(s) Oral <User Schedule>  midodrine. 30 milliGRAM(s) Oral every 8 hours  pantoprazole  Injectable 40 milliGRAM(s) IV Push two times a day  petrolatum Ophthalmic Ointment 1 Application(s) Both EYES two times a day  polymyxin B IVPB [id number] Unit(s) IV Intermittent every 12 hours  sodium chloride 1 Gram(s) Oral two times a day    MEDICATIONS  (PRN):  acetaminophen   Oral Liquid .. 650 milliGRAM(s) Oral every 6 hours PRN Temp greater or equal to 38C (100.4F), Mild Pain (1 - 3)  calamine/zinc oxide Lotion 1 Application(s) Topical two times a day PRN Rash and/or Itching  dextrose Oral Gel 15 Gram(s) Oral once PRN Blood Glucose LESS THAN 70 milliGRAM(s)/deciliter  sodium chloride 0.9% lock flush 10 milliLiter(s) IV Push every 1 hour PRN Pre/post blood products, medications, blood draw, and to maintain line patency       Vitals:  Vital Signs Last 24 Hrs  T(C): 37.2 (09 Oct 2023 15:28), Max: 37.4 (09 Oct 2023 01:35)  T(F): 98.9 (09 Oct 2023 15:28), Max: 99.4 (09 Oct 2023 11:55)  HR: 110 (09 Oct 2023 15:56) (82 - 112)  BP: 90/39 (09 Oct 2023 15:28) (90/39 - 105/52)  BP(mean): 50 (09 Oct 2023 10:55) (50 - 50)  RR: 23 (09 Oct 2023 15:28) (20 - 23)  SpO2: 100% (09 Oct 2023 15:56) (96% - 100%)    Parameters below as of 09 Oct 2023 15:28  Patient On (Oxygen Delivery Method): ventilator            Neurological Exam:  Mental Status: Eyes closed, off sedation. Does not follow commands,  + trach to vent and NGT   Cranial Nerves: PERRL slightly sluggish, L subconjunctival hemorrhage  Motor: Moves upper extremities spontaneously, tremor R > L  no movement noted in lowers  Sensation: WD to noxious x4    I personally reviewed the below data/images/labs:         LABS:                          8.1    13.81 )-----------( 340      ( 09 Oct 2023 06:25 )             25.8     10-09    132<L>  |  95<L>  |  43<H>  ----------------------------<  256<H>  3.9   |  24  |  1.76<H>    Ca    8.5      09 Oct 2023 06:25  Phos  2.9     10-09  Mg     1.80     10-09    TPro  5.6<L>  /  Alb  2.0<L>  /  TBili  0.3  /  DBili  <0.2  /  AST  52<H>  /  ALT  30  /  AlkPhos  330<H>  10-09    LIVER FUNCTIONS - ( 09 Oct 2023 06:25 )  Alb: 2.0 g/dL / Pro: 5.6 g/dL / ALK PHOS: 330 U/L / ALT: 30 U/L / AST: 52 U/L / GGT: x             Urinalysis Basic - ( 09 Oct 2023 06:25 )    Color: x / Appearance: x / SG: x / pH: x  Gluc: 256 mg/dL / Ketone: x  / Bili: x / Urobili: x   Blood: x / Protein: x / Nitrite: x   Leuk Esterase: x / RBC: x / WBC x   Sq Epi: x / Non Sq Epi: x / Bacteria: x              Urinalysis Basic - ( 05 Oct 2023 05:43 )    Color: x / Appearance: x / SG: x / pH: x  Gluc: 148 mg/dL / Ketone: x  / Bili: x / Urobili: x   Blood: x / Protein: x / Nitrite: x   Leuk Esterase: x / RBC: x / WBC x   Sq Epi: x / Non Sq Epi: x / Bacteria: x              < from: CT Head No Cont (08.15.23 @ 17:20) >    ACC: [id number] EXAM:  CT BRAIN   ORDERED BY: MINAL SAM     PROCEDURE DATE:  08/15/2023          INTERPRETATION:  Clinical indication: Change in neuro exam.    Multiple axial sections were performed from base to vertex without   contrast enhancement. Coronal and sagittal reconstructions were   reformatted well.    This exam is compared prior head CT performed on August 11, 2023    Parenchymal volume loss and chronic microvessel ischemic changes are   again seen.    Abnormal low-attenuation involving the left occipital cortical   subcortical region is again seen. 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    No evidence of acute hemorrhage mass or mass effect is seen.    Evaluation of the osseous structures with appropriate window demonstrates   sclerotic changes about the left mastoid region which appears stable.   Opacification left middle ear region is again seen.    Patient is status post bilateral cataract surgery.    Impression: Stable exam.    < end of copied text >    vEEG:    Clinical Impression:  - Severe diffuse non-specific cerebral dysfunction  - There were no epileptiform abnormalities or seizures recorded.        CTH 8/11:    VENTRICLES AND SULCI: Age-appropriate involutional change  INTRA-AXIAL:  Old left PCA infarct as seen on the prior unchanged.   Microvascular ischemic changes involving the periventricular and   subcortical white matter as seen previously  EXTRA-AXIAL:  No mass or collection is seen.  VISUALIZED SINUSES:  Clear.  VISUALIZED MASTOIDS: Left mastoid sclerosis  CALVARIUM: Infiltrative appearance tothe calvarium may be indicative of   marrow infiltration on the basis of patient's known diagnosis of breast   cancer. MISCELLANEOUS:  None.    IMPRESSION:  No significant interval change compared with 7/17/2023 in   left PCA infarct which is old. Microvascular ischemic changes involving   the periventricular and subcortical white matter as seen   previously.Questionable lesions at the level of the calvarium related to   possible breast CA. Clinical correlation recommended.    --- End of Report ---      ct< from: CT Head No Cont (09.26.23 @ 21:02) >  IMPRESSION: Vague questionable areas of hypoattenuation within the   bilateral cerebellar hemispheres which may be compatible with   acute/subacute ischemia. Recommend further evaluation with a brain MRI   study, provided there are no MRI contraindications.    No acute intracranial hemorrhage.    Previously seen questionable vague wedge-shaped area of hypoattenuation   in the left parietal lobe with artifactual secondary to motion and volume   averaging with a prominent sulcus.    Chronic left occipital lobe infarct.    < end of copied text >    < from: CT Head No Cont (10.03.23 @ 20:46) >    IMPRESSION:    No acute intracranial hemorrhage or mass effect. Evaluation of the   posterior fossa degraded by streak artifact from dental amalgam.   Lucencies in the bilateral cerebellum may be artifactual.    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    Bilateral middle ear and mastoid effusions which are also seen on prior   exam.    EEG Classification / Summary:  Abnormal EEG in the awake, drowsy states.   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   -No seizures

## 2023-10-09 NOTE — PROGRESS NOTE ADULT - ASSESSMENT
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 While in MICU patient noted with worsening renal failure requiring HD initiation, CGE/ Melena s/p EGD 8/31 found with esophagitis and erosive gastropathy, new right cerebellar infarct and fevers second to ESBL COLI and MSSA VAP, MSSA bacteremia, left ear otitis externa and drug rxn to cephalosporins Course further complicated by prolonged vent time s/p tracheostomy 9/1 and transferred to RCU 9/3 completed by recurrent fevers with high PIP, respiratory acidosis and concern for volume overloaded.   CTH repeated 9/26 for concern for encephalopathy - has known now - chronic bilateral cerebellar infarcts, L PCA infarct. L parietal hypodensity seen on prior CT likely artifactual  Repeat CTH 10/3 - unchanged  EEG 10/5 with L posterocentral/temporal spikes/sharp waves - doubt true focal seizure upon further review of EEG     Impression:   ? Focal seizure - has hx of bilateral R > L tremors   Metabolic encephalopathy related to shock, infection, doubt new stroke  L PCA stroke, ESUS s/p ILR, also with bilateral centrum semiovale watershed infarcts   Multiple embolic strokes on MRI 8/17 - R cerebellum, midbrain, multiple other tiny embolic infarcts.   Dementia   MSSA bacteremia, r/o endocarditis s/p Daptomycin  Acute popliteal DVT on Eliquis   Anemia     Recommendations   [] has multiple areas of epileptogenic potential on EEG, no clear seizure correlate seen. On Keppra but no improvement in mental status  [] consider MRI brain once stable  [] mgmt of hypotension per piumary team, remains full code  [] Keppra 500mg BID with extra 250mg after HD on HD days.  [] New CTH from 9/26 and 10/3 without any evidence of acute infarct -> encephalopathy likely related to underlying metabolic derangements.  [] GI following for coffee ground emesis - esophagitis seen on colonoscopy, on ELiquis  [] family declined kidney biopsy for AIN -> s/p steroid tx   []  AC on hold given anemia and bleeding, resume when able. Now on ASA  [] C/w home Atorvastatin 10 mg qhs   [] would interrogate ILR and review tele to see if pAF -. no AF seen  [] continue to address GOC with family as pts  and daughter continue to remain hopeful    d/w daughter    Katty Charles DO  Vascular Neurology  Office [phone]

## 2023-10-09 NOTE — PROGRESS NOTE ADULT - NS ATTEND AMEND GEN_ALL_CORE FT
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 ESRD s/p ICU and intubation. Course further c/b GIB s/p EGD, new R cerebellar infarct, and pneumonia/bacteremia, and prolonged respiratory failure require tracheostomy.    #NEURO  Previous episodes of possible seizure, EEG with possible focal seizure with RUE twitching and at risk of focal onset seizures from multiple locations, especially left posterior temporal/posterocentral region, likely due to encephalomalacia/gliosis from prior left parietal CVA. Also with underlying encephalopathy due to acute illnes + RUSS + sepsis + history of CVA's (MICU course complicated by new R cerebellar, midbrain, and multiple tiny embolic infarcts as well as known left PCA CVA). Repeat CT head on 10/3 with no acute changes. Ammonia normal. BUN improved. Continue vEEG monitoring.  - Started on levetiracetam.   - no evidence of seizure today     #Renal  - Trial of HD today   with fluid removal per renal. Hold HD if hypotensive, midodrine to be given prior   - grossly fluid overloaded on exam   - stable H/H. Anemia of CKD, continue Epoetin per nephrology.     #ID  - Repeat sputum culture with numerous carbapenem-resistant Pseudomonas aeruginosa. However, she is allergic to PCNs and cephalosporins. Appreciate ID follow-up, currently on Polymyxin B. Appreciate ENT follow-up for left ear otitis externa, which has now resolved. Follow-up ophthalmology regarding keratitis + chemosis, on Lacrilube and artificial tears.   - ongoing sepsis with no better antibiotic options given allergies and resistance profile  - appreciate ID input    #CVS  - hypotensive, Continue midodrine 30 mg q8h.  -  Continue aspirin and statin given h/o CVA  - informed this evening that patient has been persistently hypotensive since HD completed. ICU consult for evaluation for pressors/higher level of care   - On apixaban for DVT.  apixaban held on 10/7 for Permacath. continue to hold given bloody secretions. I informed family and they are aware that as such she is at risk for further clots.       #Pulm   - Continue lung protective ventilation.   - Airway clearance therapy with albuterol  - Bloody secretions noted upon suction today - FiO2 requirements unchanged. Will discontinue hypertonic saline, continue albuterol. Monitor closely, if develops antonina hemoptysis start TXA nebs 500 mg q8h standing for 72 hrs    #GI  tube feeds as tolerates. PPI for GI ppx.  - check LFTs    #GOALS OF CARE   Goals of care meeting with daughter and , BETTINA Candelario and Nurse Manager present today. We discussed Ms. Barraza's current clinical condition detailing management above. All questions from family answered. I informed family that despite all our efforts she is extremely ill and at risk for further deterioration. They understand and wish to pursue all possible medical interventions. She will remain full code. 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 ESRD s/p ICU and intubation. Course further c/b GIB s/p EGD, new R cerebellar infarct, and pneumonia/bacteremia, and prolonged respiratory failure require tracheostomy.    ** I was informed this evening patient became hypotensive. Receiving albumin IV currently and ICU consult placed to request assistance/evaluation for pressors.     #NEURO  Previous episodes of possible seizure, EEG with possible focal seizure with RUE twitching and at risk of focal onset seizures from multiple locations, especially left posterior temporal/posterocentral region, likely due to encephalomalacia/gliosis from prior left parietal CVA. Also with underlying encephalopathy due to acute illnes + RUSS + sepsis + history of CVA's (MICU course complicated by new R cerebellar, midbrain, and multiple tiny embolic infarcts as well as known left PCA CVA). Repeat CT head on 10/3 with no acute changes. Ammonia normal. BUN improved. Continue vEEG monitoring.  - Started on levetiracetam.   - no evidence of seizure today     #Renal  - Trial of HD today   with fluid removal per renal. Hold HD if hypotensive, midodrine to be given prior   - grossly fluid overloaded on exam   - stable H/H. Anemia of CKD, continue Epoetin per nephrology.     #ID  - Repeat sputum culture with numerous carbapenem-resistant Pseudomonas aeruginosa. However, she is allergic to PCNs and cephalosporins. Appreciate ID follow-up, currently on Polymyxin B. Appreciate ENT follow-up for left ear otitis externa, which has now resolved. Follow-up ophthalmology regarding keratitis + chemosis, on Lacrilube and artificial tears.   - ongoing sepsis with no better antibiotic options given allergies and resistance profile  - appreciate ID input    #CVS  - hypotensive, Continue midodrine 30 mg q8h.  -  Continue aspirin and statin given h/o CVA  - I was informed this evening that patient has been persistently hypotensive since HD completed. ICU consulted for evaluation for pressors/higher level of care   - On apixaban for DVT.  apixaban held on 10/7 for Permacath. continue to hold given bloody secretions. I informed family and they are aware that as such she is at risk for further clots.       #Pulm   - Continue lung protective ventilation.   - Airway clearance therapy with albuterol  - Bloody secretions noted upon suction today - FiO2 requirements unchanged. Will discontinue hypertonic saline, continue albuterol. Monitor closely, if develops antonina hemoptysis start TXA nebs 500 mg q8h standing for 72 hrs    #GI  tube feeds as tolerates. PPI for GI ppx.  - check LFTs    #GOALS OF CARE   Goals of care meeting with daughter and , BETTINA Candelario and Nurse Manager present today. We discussed Ms. Barraza's current clinical condition detailing management above. All questions from family answered. I informed family that despite all our efforts she is extremely ill and at risk for further deterioration. They understand and wish to pursue all possible medical interventions. She will remain full code.

## 2023-10-09 NOTE — CONSULT NOTE ADULT - SUBJECTIVE AND OBJECTIVE BOX
SUBJECTIVE  CONSULT QUESTION: ?Next steps for management of post-HD Hypotension  SUMMARY OF HOSPITALIZATION / HPI        PAST MEDICAL/SURGICAL HISTORY  PAST MEDICAL & SURGICAL HISTORY:  Breast CA  Diabetes  Stroke  Cardiac arrest  HTN (hypertension)  H/O mastectomy, bilateral    FAMILY HISTORY:  No pertinent family history in first degree relatives    ALLERGIES  Allergies    isoniazid (Rash)  nafcillin (Unknown)  hydrALAZINE (Rash)  vitamin E (Short breath; Urticaria; Hives)  doxycycline (Rash)  cefepime (Rash)  NIFEdipine (Urticaria; Hives)    Intolerances        HOME MEDICATIONS:      HOSPITAL MEDICATIONS:  MEDICATIONS  (STANDING):  albuterol    0.083% 2.5 milliGRAM(s) Nebulizer every 6 hours  artificial tears (preservative free) Ophthalmic Solution 1 Drop(s) Both EYES every 4 hours  aspirin  chewable 81 milliGRAM(s) Enteral Tube daily  atorvastatin 10 milliGRAM(s) Oral at bedtime  chlorhexidine 0.12% Liquid 15 milliLiter(s) Oral Mucosa every 12 hours  chlorhexidine 2% Cloths 1 Application(s) Topical daily  dextrose 5%. 1000 milliLiter(s) (50 mL/Hr) IV Continuous <Continuous>  dextrose 5%. 1000 milliLiter(s) (100 mL/Hr) IV Continuous <Continuous>  dextrose 50% Injectable 12.5 Gram(s) IV Push once  dextrose 50% Injectable 25 Gram(s) IV Push once  dextrose 50% Injectable 25 Gram(s) IV Push once  dextrose 50% Injectable 25 Gram(s) IV Push once  epoetin odalis (EPOGEN) Injectable 61017 Unit(s) IV Push <User Schedule>  ferrous    sulfate Liquid 300 milliGRAM(s) Oral daily  glucagon  Injectable 1 milliGRAM(s) IntraMuscular once  insulin lispro (ADMELOG) corrective regimen sliding scale   SubCutaneous every 6 hours  insulin NPH human recombinant 6 Unit(s) SubCutaneous every 6 hours  levETIRAcetam  Solution 1000 milliGRAM(s) Oral daily  levETIRAcetam  Solution 250 milliGRAM(s) Oral <User Schedule>  midodrine. 30 milliGRAM(s) Oral every 8 hours  pantoprazole  Injectable 40 milliGRAM(s) IV Push two times a day  petrolatum Ophthalmic Ointment 1 Application(s) Both EYES two times a day  polymyxin B IVPB 987603 Unit(s) IV Intermittent every 12 hours  sodium chloride 1 Gram(s) Oral two times a day    MEDICATIONS  (PRN):  acetaminophen   Oral Liquid .. 650 milliGRAM(s) Oral every 6 hours PRN Temp greater or equal to 38C (100.4F), Mild Pain (1 - 3)  calamine/zinc oxide Lotion 1 Application(s) Topical two times a day PRN Rash and/or Itching  dextrose Oral Gel 15 Gram(s) Oral once PRN Blood Glucose LESS THAN 70 milliGRAM(s)/deciliter  sodium chloride 0.9% lock flush 10 milliLiter(s) IV Push every 1 hour PRN Pre/post blood products, medications, blood draw, and to maintain line patency      REVIEW OF SYSTEMS:  [ ] All other systems negative  [ ] Unable to assess ROS because ________    OBJECTIVE:  VITAL SIGNS  ICU Vital Signs Last 24 Hrs  T(C): 37.2 (09 Oct 2023 15:28), Max: 37.4 (09 Oct 2023 01:35)  T(F): 98.9 (09 Oct 2023 15:28), Max: 99.4 (09 Oct 2023 11:55)  HR: 110 (09 Oct 2023 15:56) (82 - 112)  BP: 90/39 (09 Oct 2023 15:28) (90/39 - 105/52)  BP(mean): 50 (09 Oct 2023 10:55) (50 - 50)  ABP: --  ABP(mean): --  RR: 23 (09 Oct 2023 15:28) (20 - 23)  SpO2: 100% (09 Oct 2023 15:56) (96% - 100%)    O2 Parameters below as of 09 Oct 2023 15:28  Patient On (Oxygen Delivery Method): ventilator          Mode: AC/ CMV (Assist Control/ Continuous Mandatory Ventilation), RR (machine): 22, TV (machine): 340, FiO2: 35, PEEP: 8, ITime: 0.8, MAP: 20, PIP: 53    10-08 @ 07:01  -  10-09 @ 07:00  --------------------------------------------------------  IN: 480 mL / OUT: 0 mL / NET: 480 mL    10-09 @ 07:01  -  10-09 @ 19:54  --------------------------------------------------------  IN: 400 mL / OUT: 2067 mL / NET: -1667 mL        PHYSICAL EXAM:  GEN: Sleeping, not engaging meaningfully with this author  HEENT: NCAT  CARDIO: RRR.   RESP: Diffusely rhoncherous breath sounds b/l  ABD: Soft, NTND.   MSK: No obvious deformity or ROM deficit. Diffuse peripheral edema / anasarca  SKIN: Warm, dry.     LAB DATA                        8.1    13.81 )-----------( 340      ( 09 Oct 2023 06:25 )             25.8     10-09    132<L>  |  95<L>  |  43<H>  ----------------------------<  256<H>  3.9   |  24  |  1.76<H>    Ca    8.5      09 Oct 2023 06:25  Phos  2.9     10-09  Mg     1.80     10-09    TPro  5.6<L>  /  Alb  2.0<L>  /  TBili  0.3  /  DBili  <0.2  /  AST  52<H>  /  ALT  30  /  AlkPhos  330<H>  10-09    Urinalysis Basic - ( 09 Oct 2023 06:25 )    Color: x / Appearance: x / SG: x / pH: x  Gluc: 256 mg/dL / Ketone: x  / Bili: x / Urobili: x   Blood: x / Protein: x / Nitrite: x   Leuk Esterase: x / RBC: x / WBC x   Sq Epi: x / Non Sq Epi: x / Bacteria: x    CAPILLARY BLOOD GLUCOSE      POCT Blood Glucose.: 258 mg/dL (09 Oct 2023 17:28)  POCT Blood Glucose.: 253 mg/dL (09 Oct 2023 11:16)  POCT Blood Glucose.: 260 mg/dL (09 Oct 2023 06:19)  POCT Blood Glucose.: 243 mg/dL (08 Oct 2023 23:47)      ADDITIONAL TESTS & IMAGING  RADIOLOGY:  < from: Xray Chest 1 View- PORTABLE-Urgent (Xray Chest 1 View- PORTABLE-Urgent .) (10.08.23 @ 10:41) >  IMPRESSION:  Slightly improved pulmonary edema.    < end of copied text >    MICROBIOLOGY:     Culture - Blood (collected 09-29-23 @ 15:00)  Source: .Blood Blood-Venous  Final Report (10-04-23 @ 19:01):    No growth at 5 days    Culture - Blood (collected 09-29-23 @ 13:15)  Source: .Blood Blood-Peripheral  Final Report (10-04-23 @ 19:00):    No growth at 5 days    Culture - Blood (collected 09-26-23 @ 16:00)  Source: .Blood Blood-Peripheral  Final Report (10-01-23 @ 19:00):    No growth at 5 days    Culture - Blood (collected 09-20-23 @ 19:26)  Source: .Blood Blood-Venous  Final Report (09-25-23 @ 22:00):    No growth at 5 days    Culture - Blood (collected 09-20-23 @ 12:09)  Source: .Blood Blood-Peripheral  Final Report (09-25-23 @ 16:01):    No growth at 5 days    Culture - Urine (collected 09-26-23 @ 18:08)  Source: Clean Catch Clean Catch (Midstream)  Final Report (09-28-23 @ 15:36):    10,000 - 49,000 CFU/mL Candida albicans "Susceptibilities not performed"    CARDIOLOGY DATA:     SUBJECTIVE  CONSULT QUESTION: ?Next steps for management of post-HD Hypotension  SUMMARY OF HOSPITALIZATION / HPI  74 YO F with PMHx of IDDM2, HTN, Diabetic Nephropathic CKD, HFpEF, Breast Cancer s/p BL mastectomy, chemotherapy and radiation (2018), Respiratory and Cardiac Arrest (2018), 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 While in MICU patient noted with worsening renal failure requiring HD initiation, CGE/ Melena s/p EGD 8/31 found with esophagitis and erosive gastropathy, new right cerebellar infarct and fevers second to ESBL COLI and MSSA VAP, MSSA bacteremia, left ear otitis externa and drug rxn to cephalosporins. 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 Case discussed at length renal and given good UOP attempted renal recovery, however failed, and upon reinitiation of HD attempt. R IJ SHILEY failed. Attempted volume removal with Bumex GTT however course complicated by hypotension requiring transfer back to MICU 9/26 for pressor support. Diuresis dc'ed, pressors weaned off and stress steroids started. L IJ SHILEY placed (9/30) and HD reinstated. Course further complicated by AOCD and  PSA and Proteus VAP. Patient transferred back to RCU 10/1 with course complicated by volume overload and grossly resistant organisms.    Now underwent HD for 1.7L and hypotensive afterward.      PAST MEDICAL/SURGICAL HISTORY  PAST MEDICAL & SURGICAL HISTORY:  Breast CA  Diabetes  Stroke  Cardiac arrest  HTN (hypertension)  H/O mastectomy, bilateral    FAMILY HISTORY:  No pertinent family history in first degree relatives    ALLERGIES  Allergies    isoniazid (Rash)  nafcillin (Unknown)  hydrALAZINE (Rash)  vitamin E (Short breath; Urticaria; Hives)  doxycycline (Rash)  cefepime (Rash)  NIFEdipine (Urticaria; Hives)    Intolerances        HOME MEDICATIONS:      HOSPITAL MEDICATIONS:  MEDICATIONS  (STANDING):  albuterol    0.083% 2.5 milliGRAM(s) Nebulizer every 6 hours  artificial tears (preservative free) Ophthalmic Solution 1 Drop(s) Both EYES every 4 hours  aspirin  chewable 81 milliGRAM(s) Enteral Tube daily  atorvastatin 10 milliGRAM(s) Oral at bedtime  chlorhexidine 0.12% Liquid 15 milliLiter(s) Oral Mucosa every 12 hours  chlorhexidine 2% Cloths 1 Application(s) Topical daily  dextrose 5%. 1000 milliLiter(s) (50 mL/Hr) IV Continuous <Continuous>  dextrose 5%. 1000 milliLiter(s) (100 mL/Hr) IV Continuous <Continuous>  dextrose 50% Injectable 12.5 Gram(s) IV Push once  dextrose 50% Injectable 25 Gram(s) IV Push once  dextrose 50% Injectable 25 Gram(s) IV Push once  dextrose 50% Injectable 25 Gram(s) IV Push once  epoetin odalis (EPOGEN) Injectable 57531 Unit(s) IV Push <User Schedule>  ferrous    sulfate Liquid 300 milliGRAM(s) Oral daily  glucagon  Injectable 1 milliGRAM(s) IntraMuscular once  insulin lispro (ADMELOG) corrective regimen sliding scale   SubCutaneous every 6 hours  insulin NPH human recombinant 6 Unit(s) SubCutaneous every 6 hours  levETIRAcetam  Solution 1000 milliGRAM(s) Oral daily  levETIRAcetam  Solution 250 milliGRAM(s) Oral <User Schedule>  midodrine. 30 milliGRAM(s) Oral every 8 hours  pantoprazole  Injectable 40 milliGRAM(s) IV Push two times a day  petrolatum Ophthalmic Ointment 1 Application(s) Both EYES two times a day  polymyxin B IVPB 137957 Unit(s) IV Intermittent every 12 hours  sodium chloride 1 Gram(s) Oral two times a day    MEDICATIONS  (PRN):  acetaminophen   Oral Liquid .. 650 milliGRAM(s) Oral every 6 hours PRN Temp greater or equal to 38C (100.4F), Mild Pain (1 - 3)  calamine/zinc oxide Lotion 1 Application(s) Topical two times a day PRN Rash and/or Itching  dextrose Oral Gel 15 Gram(s) Oral once PRN Blood Glucose LESS THAN 70 milliGRAM(s)/deciliter  sodium chloride 0.9% lock flush 10 milliLiter(s) IV Push every 1 hour PRN Pre/post blood products, medications, blood draw, and to maintain line patency      REVIEW OF SYSTEMS:  [ ] All other systems negative  [ x ] Unable to assess ROS because ___persistent AMS_____    OBJECTIVE:  VITAL SIGNS  ICU Vital Signs Last 24 Hrs  T(C): 37.2 (09 Oct 2023 15:28), Max: 37.4 (09 Oct 2023 01:35)  T(F): 98.9 (09 Oct 2023 15:28), Max: 99.4 (09 Oct 2023 11:55)  HR: 110 (09 Oct 2023 15:56) (82 - 112)  BP: 90/39 (09 Oct 2023 15:28) (90/39 - 105/52)  BP(mean): 50 (09 Oct 2023 10:55) (50 - 50)  ABP: --  ABP(mean): --  RR: 23 (09 Oct 2023 15:28) (20 - 23)  SpO2: 100% (09 Oct 2023 15:56) (96% - 100%)    O2 Parameters below as of 09 Oct 2023 15:28  Patient On (Oxygen Delivery Method): ventilator      Mode: AC/ CMV (Assist Control/ Continuous Mandatory Ventilation), RR (machine): 22, TV (machine): 340, FiO2: 35, PEEP: 8, ITime: 0.8, MAP: 20, PIP: 53    10-08 @ 07:01  -  10-09 @ 07:00  --------------------------------------------------------  IN: 480 mL / OUT: 0 mL / NET: 480 mL    10-09 @ 07:01  -  10-09 @ 19:54  --------------------------------------------------------  IN: 400 mL / OUT: 2067 mL / NET: -1667 mL    PHYSICAL EXAM:  GEN: Sleeping, not engaging meaningfully with this author  HEENT: NCAT  CARDIO: RRR.   RESP: Diffusely rhoncherous breath sounds b/l  ABD: Soft, NTND.   MSK: No obvious deformity or ROM deficit. Diffuse peripheral edema / anasarca  SKIN: Warm, dry.     LAB DATA                        8.1    13.81 )-----------( 340      ( 09 Oct 2023 06:25 )             25.8     10-09    132<L>  |  95<L>  |  43<H>  ----------------------------<  256<H>  3.9   |  24  |  1.76<H>    Ca    8.5      09 Oct 2023 06:25  Phos  2.9     10-09  Mg     1.80     10-09    TPro  5.6<L>  /  Alb  2.0<L>  /  TBili  0.3  /  DBili  <0.2  /  AST  52<H>  /  ALT  30  /  AlkPhos  330<H>  10-09    Urinalysis Basic - ( 09 Oct 2023 06:25 )    Color: x / Appearance: x / SG: x / pH: x  Gluc: 256 mg/dL / Ketone: x  / Bili: x / Urobili: x   Blood: x / Protein: x / Nitrite: x   Leuk Esterase: x / RBC: x / WBC x   Sq Epi: x / Non Sq Epi: x / Bacteria: x    CAPILLARY BLOOD GLUCOSE      POCT Blood Glucose.: 258 mg/dL (09 Oct 2023 17:28)  POCT Blood Glucose.: 253 mg/dL (09 Oct 2023 11:16)  POCT Blood Glucose.: 260 mg/dL (09 Oct 2023 06:19)  POCT Blood Glucose.: 243 mg/dL (08 Oct 2023 23:47)      ADDITIONAL TESTS & IMAGING  RADIOLOGY:  < from: Xray Chest 1 View- PORTABLE-Urgent (Xray Chest 1 View- PORTABLE-Urgent .) (10.08.23 @ 10:41) >  IMPRESSION:  Slightly improved pulmonary edema.    < end of copied text >    MICROBIOLOGY:     Culture - Blood (collected 09-29-23 @ 15:00)  Source: .Blood Blood-Venous  Final Report (10-04-23 @ 19:01):    No growth at 5 days    Culture - Blood (collected 09-29-23 @ 13:15)  Source: .Blood Blood-Peripheral  Final Report (10-04-23 @ 19:00):    No growth at 5 days    Culture - Blood (collected 09-26-23 @ 16:00)  Source: .Blood Blood-Peripheral  Final Report (10-01-23 @ 19:00):    No growth at 5 days    Culture - Blood (collected 09-20-23 @ 19:26)  Source: .Blood Blood-Venous  Final Report (09-25-23 @ 22:00):    No growth at 5 days    Culture - Blood (collected 09-20-23 @ 12:09)  Source: .Blood Blood-Peripheral  Final Report (09-25-23 @ 16:01):    No growth at 5 days    Culture - Urine (collected 09-26-23 @ 18:08)  Source: Clean Catch Clean Catch (Midstream)  Final Report (09-28-23 @ 15:36):    10,000 - 49,000 CFU/mL Candida albicans "Susceptibilities not performed"

## 2023-10-09 NOTE — PROGRESS NOTE ADULT - ASSESSMENT
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 While in MICU patient noted with worsening renal failure requiring HD initiation, CGE/ Melena s/p EGD 8/31 found with esophagitis and erosive gastropathy, new right cerebellar infarct and fevers second to ESBL COLI and MSSA VAP, MSSA bacteremia, left ear otitis externa and drug rxn to cephalosporins. 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 Case discussed at length renal and given good UOP attempted renal recovery, however failed, and upon reinitiation of HD attempt. R IJ SHILEY failed. Attempted volume removal with Bumex GTT however course complicated by hypotension requiring transfer back to MICU 9/26 for pressor support. Diuresis dc'ed, pressors weaned off and stress steroids started. L IJ SHILEY placed (9/30) and HD reinstated. Course further complicated by AOCD and  PSA and Proteus VAP. Patient transferred back to RCU 10/1 with course complicated by volume overload and grossly resistant organisms.    NEUROLOGY  # NEW cerebella infarct   - Baseline MS AOX3 with short term memory changes per family however noted with concern for poor mentation in ICU  - MRI (8/17) with NEW right cerebellar infarct and old left PCA/occipital infarcts  - STEPHANIE (8/17) with AV showing two linear mobile echogenic elements attached to valve leaflets consistent with Lambel's excrescence, but no TRINITY thrombus, and lambel's excrescence with uncertain clinical implication.   - EEG (8/11-8/15) with no seizures   - ILR interrogation (9/7) with SR and PACs falsely recorded as AF.   - Continue on ASA and lipitor for medical management     # Seizures   - Course complicated by questionable head and body shaking with prolactin elevated 9/8. Discussed for spot EEG however held given low likelihood of seizures noted and acute respiratory illnesses   - Course further complicated with right arm twitching and RPT EEG (10/4) with no seizure however but noted with increase seizure potential in left posterior quadrants.   - RPT EEG (10/5) with possible focal seizures and risk of focal-onset seizures from multiple locations, most prominent in the left posterior temporal/posterocentral region  - RPT EEG (10/6) with RUE twitching are likely non-epileptic in nature, however risk of focal-onset seizures from multiple locations  - Continue on Keppra PPX     # Metabolic vs Uremic Encephalopathy   - Lethargy noted with progression to stupor thought to be second to uremia.   - RPT CTH (9/26) with vague areas of hypoattenuation within the bilateral cerebellar hemispheres which may be compatible with acute/subacute ischemia.   - Discussed CTH with neurology and no signs of new infarct noted.   - HD reinstated in ICU with improvement in uremia however mentation remained   - Poor mentation likely in setting of infections.   - Monitor mentation closely     CARDIOVASCULAR  # HTN Urgency   # Septic vs vasoplegic shock   - Labile BPs ranging in between SBP 80s-200s and attempted labetalol, however noted with hypotension and bradycardia likely from BB toxicity vs shock state?    - Holding Labetalol, Catapres and Catapres.    - Attempted volume removal with bumex diuresis however noted with return of shock state requiring pressor support and transfer back to ICU.   - Pressors weaned off to stress dose (last day 10/4) and Midodrine   - Continue on Midodrine 30 TID (9/29 - ) and ensure timed prior to HD  - DO NOT HOLD MIDODRINE     # Hx of HFpEF with likely ADHF with volume overload.   - ECHO (7/2023) with EF 59 with severe LVH and diastolic dysfunction   - STEPHANIE (8/18) with normal LVRVSF however noted with increased LA pressures and persistent LA septal bowing into RA.   - ECHO (8/11) with EF 60 with mild LVH, normal LVRVSF, and mild ddfxn.  - Grossly volume overloaded and removing volume with HD midodrine assisted sessions     HEENT   # Left ear OE with acute on chronic left-sided otomastoiditis  - ENT evaluation (9/7) with no mastoid tenderness, however found with positive swelling and excoriation to left auricle and tenderness to manipulation of auricle. Debrided crusting of auricle and EAC evaluated with edema and unable to visualize TM. EAC debrided and found with watery exudate.   - CT IAC with acute on chronic left-sided otitis externa and acute on chronic left-sided otomastoiditis. Superimposed left-sided tympanosclerosis. Superimposed cholesteatoma formation within the left tympanomastoid cavity cannot be excluded  - Completed CiproHC (9/5 - 9/16), Bradford (9/1-10/1) and acetic acid and bacitracin.   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   - Continue on empiric valtrex 500mg BID (9/29 - 10/8) per ophtho and ID  - Continue preservative free artificial tears 4 times a day in the both eye  - Continue lacrilube ointment twice a day in the both eyes     # Oral Lesions with questionable Zoster vs Trauma?   - Patient with tongue pain and seen with anterior ulcerations consolidating and crusting and posterior ulceration.  - Case discussed with pathology resident and culture/ cytology sent.   - HSV negative and Path (9/6) with normal appearing epithelial cells singly and in clusters devoid of viral cytopathic effect.   - Continue on magic mouth wash for pain    RESPIRATORY  # AHHRF second to volume overload   - Hx of CKD and HFpEF presented with SOB and hypercarbia second to volume overload requiring intubation and HD initiation   - Vent weaning attempted however limited requiring tracheostomy (9/1) with IP   - Course complicated by PIPs elevated and found with tracheomalacia and volume overloaded.   - CT CHEST (9/8) with tracheomalacia, BL pleural effusions and continued consolidations   - Continue on vent and given TBM increased PEEP to 8 with improvement in dynamic collapse, but PIP waxes and wanes with volume overloaded and secretions.  - Continue on albuterol, HTS and chest PT  - Continue volume removal with HD   - Hold atrovent given thick secretions   - Hold IPV given high inspiratory pressures      GI  # UGIB   - CGE x 1 episode on 8/24 and melena x 2 episodes on 8/31 likely residual blood.   - EGD (8/31) found with esophagitis and erosive gastropathy  - Continue on PPI BID through late October and then PPI QD     # Dysphagia   - NGT-TF continued   - Pending PEG however needs improvement prior to placement.     RENAL  # Progressive CKD   - Presented volume overload and progressive RUSS on CKD   - POCUS with no signs of hydronephrosis in ICU  - Pressor support started with improvement in renal function in ICU  - Attempted steroid course in ICU without improvement in renal function   - Case discussed at length with renal and initiated on HD in ICU   - Remain on HD while in RCU however noted to be volume overloaded. Attempted Bumex pushes and patient noted with good UOP.   - Attempted renal recovery in RCU however noted with decent UOP, but BUN/ CRE continued to rise without improvement on diuresis.   - Ultimately resumed on HD MWF TIW with midodrine assisted volume removal.     # Hyponatremia   - Continue on salt tabs 1G  (decreased 10/6) and weaning off as tolerated with volume removal   - Monitor sodium     # Hyperphosphatemia to Hypophosphatemia with HD  - PTH normal and elevated phos likely from renal failure  - Phos binder with Renvela started with improvement however then noted with hypophosphatemia and Renvela stopped.     INFECTIOUS DISEASE  # ESBL ECOLI and MSSA VAP c/b MSSA bacteremia   - RVP/ COVID (8/11) negative   - SCx (8/11) with MSSA s/p cefepime (8/12-8/13) and then ancef (8/14) however noted with drug reaction and then changed to vanco and aztreonam (8/12-8/13) in ICU .   - Course complicated by recurrent fevers and return of MSSA with bacteremia.   - SCx (9/1) with MSSA and ESBL ECOLI   - BAL (9/1) with MSSA   - BCx (9/1) with MSSA and cleared on (9/4)   - ECHO (9/6) unable to rule out endocarditis   - Continue on Vanco (9/4-9/22) however noted with rashes of uncertain etiology and replaced with Bradford (9/4 - 10/2) and DAPTO (9/26-10/2) for  completion.     # Proteus and  PSA VAP/ Trachitis   - Continued fevers and SCx (9/29) with MDR  PSA and Proteus   - Continued on Bradford as above however noted with resistance and changed to Zosyn (10/2 - 10/5) with course complicated by possible drug rxn. Zosyn changed to Aztreonam (10/5 - 10/6) however RPT SCx (10/3) with  PSA however only intermediate coverage to aztreonam and amikacin.  PSA grossly resistant to other antibiotics other than cephalosporins however patient with reactions in the past. Case discussed with ID and ID pharmacist and change to Polymyxin (10/6 - ) however continues to spike fevers likely second to Polymyxin only intermediate coverage and Recarbio resistant .   - Overall difficulty with ABX tailoring given allergic rxns and resistant organisms.     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  - Monitor HH     VASCULAR   # LLE POP DVT   - US BL LE (9/10) with acute left deep vein thrombosis of the left popliteal vein.  - RUE swollen and VA DOPPLER RUE (10/3) with R IJ DVT and R brachial DVT.  - Eliquis held for permacath as below (last dose 10/7) and if cancelled or procedure performed needs to be resumed.     # HD access  - L IJ CLAUDIA (9/27 - )   - Planned for IR permacath conversion on Tuesday 10/10 however likely to be held given fever spikes.     ONC   # Hx of Breast CA   - Patient dx in 2018 and s/p BL mastectomy (radical on right) and chemo and RT.   - Supportive care.     ENDOCRINE  # IDDM2   - Continued on NPH with ISS, however noted with hypoglycemic episodes and NPH dc'ed.    - Finger sticks trending up off NPH.   - Continue on NPH 4U with moderate ISS.   - Adjust as need     SKIN  # Drug Eruption   - Attempted cefepime (8/12) vs ancef (8/13-8/14) and then noted with drug rxn in ICU. Continue on steroids with prednisone 40 (8/18 - 9/2) then prednisone 30 (9/3-9/7), then prednisone 20 (9/8 - 9/10), then prednisone 10mg (9/11-9/13) then turn off.   - Attempted Zosyn (10/2-10/5) with possible rash. Zosyn turned off with improvement.   - Hold further cephalosporins or PCNs at this time   - Monitor for further drug rxns.     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 While in MICU patient noted with worsening renal failure requiring HD initiation, CGE/ Melena s/p EGD 8/31 found with esophagitis and erosive gastropathy, new right cerebellar infarct and fevers second to ESBL COLI and MSSA VAP, MSSA bacteremia, left ear otitis externa and drug rxn to cephalosporins. 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 Case discussed at length renal and given good UOP attempted renal recovery, however failed, and upon reinitiation of HD attempt. R IJ SHILEY failed. Attempted volume removal with Bumex GTT however course complicated by hypotension requiring transfer back to MICU 9/26 for pressor support. Diuresis dc'ed, pressors weaned off and stress steroids started. L IJ SHILEY placed (9/30) and HD reinstated. Course further complicated by AOCD and  PSA and Proteus VAP. Patient transferred back to RCU 10/1 with course complicated by volume overload and grossly resistant organisms.    NEUROLOGY  # NEW cerebella infarct   - Baseline MS AOX3 with short term memory changes per family however noted with concern for poor mentation in ICU  - MRI (8/17) with NEW right cerebellar infarct and old left PCA/occipital infarcts  - STEPHANIE (8/17) with AV showing two linear mobile echogenic elements attached to valve leaflets consistent with Lambel's excrescence, but no TRINITY thrombus, and lambel's excrescence with uncertain clinical implication.   - EEG (8/11-8/15) with no seizures   - ILR interrogation (9/7) with SR and PACs falsely recorded as AF.   - Continue on ASA and lipitor for medical management     # Seizures   - Course complicated by questionable head and body shaking with prolactin elevated 9/8. Discussed for spot EEG however held given low likelihood of seizures noted and acute respiratory illnesses   - Course further complicated with right arm twitching and RPT EEG (10/4) with no seizure however but noted with increase seizure potential in left posterior quadrants.   - RPT EEG (10/5) with possible focal seizures and risk of focal-onset seizures from multiple locations, most prominent in the left posterior temporal/posterocentral region  - RPT EEG (10/6) with RUE twitching are likely non-epileptic in nature, however risk of focal-onset seizures from multiple locations  - Continue on Keppra PPX     # Metabolic vs Uremic Encephalopathy   - Lethargy noted with progression to stupor thought to be second to uremia.   - RPT CTH (9/26) with vague areas of hypoattenuation within the bilateral cerebellar hemispheres which may be compatible with acute/subacute ischemia.   - Discussed CTH with neurology and no signs of new infarct noted.   - HD reinstated in ICU with improvement in uremia however mentation remained   - Poor mentation likely in setting of toxic encephalopathy in setting of infections.     CARDIOVASCULAR  # HTN Urgency   # Septic vs vasoplegic shock   - Labile BPs ranging in between SBP 80s-200s and attempted labetalol, however noted with hypotension and bradycardia likely from BB toxicity vs shock state?    - Holding Labetalol, Catapres and Catapres.    - Attempted volume removal with bumex diuresis however noted with return of shock state requiring pressor support and transfer back to ICU.   - Pressors weaned off to stress dose (last day 10/4) and Midodrine   - Continue on Midodrine 30 TID (9/29 - ) and ensure timed 30-60 minutes prior to HD  - DO NOT HOLD MIDODRINE     # Hx of HFpEF with likely ADHF with volume overload.   - ECHO (7/2023) with EF 59 with severe LVH and diastolic dysfunction   - STEPHANIE (8/18) with normal LVRVSF however noted with increased LA pressures and persistent LA septal bowing into RA.   - ECHO (8/11) with EF 60 with mild LVH, normal LVRVSF, and mild ddfxn.  - Grossly volume overloaded and removing volume with HD midodrine assisted sessions     HEENT   # Left ear OE with acute on chronic left-sided otomastoiditis  - ENT evaluation (9/7) with no mastoid tenderness, however found with positive swelling and excoriation to left auricle and tenderness to manipulation of auricle. Debrided crusting of auricle and EAC evaluated with edema and unable to visualize TM. EAC debrided and found with watery exudate.   - CT IAC with acute on chronic left-sided otitis externa and acute on chronic left-sided otomastoiditis. Superimposed left-sided tympanosclerosis. Superimposed cholesteatoma formation within the left tympanomastoid cavity cannot be excluded  - Completed CiproHC (9/5 - 9/16), Bradford (9/1-10/1) and acetic acid and bacitracin.   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   - Continue on empiric valtrex 500mg BID (9/29 - 10/8) per ophtho and ID  - Continue preservative free artificial tears 4 times a day in the both eye  - Continue lacrilube ointment twice a day in the both eyes     # Oral Lesions with questionable Zoster vs Trauma?   - Patient with tongue pain and seen with anterior ulcerations consolidating and crusting and posterior ulceration.  - Case discussed with pathology resident and culture/ cytology sent.   - HSV negative and Path (9/6) with normal appearing epithelial cells singly and in clusters devoid of viral cytopathic effect.   - Continue on magic mouth wash for pain    RESPIRATORY  # AHHRF second to volume overload   - Hx of CKD and HFpEF presented with SOB and hypercarbia second to volume overload requiring intubation and HD initiation   - Vent weaning attempted however limited requiring tracheostomy (9/1) with IP   - Course complicated by PIPs elevated and found with tracheomalacia and volume overloaded.   - CT CHEST (9/8) with tracheomalacia, BL pleural effusions and continued consolidations   - Continue on vent and given TBM increased PEEP to 8 with improvement in dynamic collapse, but PIP waxes and wanes with volume overloaded and secretions.  - Continue on albuterol and chest PT  - Continue volume removal with HD   - Attempted HTS but noted with hemoptysis and held   - Hold Atrovent given thick secretions   - Hold IPV given high inspiratory pressures      GI  # UGIB   - CGE x 1 episode on 8/24 and melena x 2 episodes on 8/31 likely residual blood.   - EGD (8/31) found with esophagitis and erosive gastropathy  - Continue on PPI BID through late October and then PPI QD     # Dysphagia   - NGT-TF continued   - Pending PEG however needs improvement prior to placement.     RENAL  # Progressive CKD   - Presented volume overload and progressive RUSS on CKD   - POCUS with no signs of hydronephrosis in ICU  - Pressor support started with improvement in renal function in ICU  - Attempted steroid course in ICU without improvement in renal function   - Case discussed at length with renal and initiated on HD in ICU   - Remain on HD while in RCU however noted to be volume overloaded. Attempted Bumex pushes and patient noted with good UOP.   - Attempted renal recovery in RCU however noted with decent UOP, but BUN/ CRE continued to rise without improvement on diuresis.   - Ultimately resumed on HD MWF TIW with midodrine assisted volume removal, however pressure remains soft with difficulty removing aggressive fluids.     # Hyponatremia   - Continue on salt tabs 1G  (decreased 10/6) and weaning off as tolerated with volume removal   - Monitor sodium     # Hyperphosphatemia to Hypophosphatemia with HD  - PTH normal and elevated phos likely from renal failure  - Phos binder with Renvela started with improvement however then noted with hypophosphatemia and Renvela stopped.     INFECTIOUS DISEASE  # ESBL ECOLI and MSSA VAP c/b MSSA bacteremia   - RVP/ COVID (8/11) negative   - SCx (8/11) with MSSA s/p cefepime (8/12-8/13) and then ancef (8/14) however noted with drug reaction and then changed to vanco and aztreonam (8/12-8/13) in ICU .   - Course complicated by recurrent fevers and return of MSSA with bacteremia.   - SCx (9/1) with MSSA and ESBL ECOLI   - BAL (9/1) with MSSA   - BCx (9/1) with MSSA and cleared on (9/4)   - ECHO (9/6) unable to rule out endocarditis   - Continue on Vanco (9/4-9/22) however noted with rashes of uncertain etiology and replaced with Bradford (9/4 - 10/2) and DAPTO (9/26-10/2) for  completion.     # Proteus and  PSA VAP/ Trachitis   - Continued fevers and SCx (9/29) with MDR  PSA and Proteus   - Continued on Bradford as above however noted with resistance and changed to Zosyn (10/2 - 10/5) with course complicated by possible drug rxn. Zosyn changed to Aztreonam (10/5 - 10/6) however RPT SCx (10/3) with  PSA however only intermediate coverage to aztreonam and amikacin.  PSA grossly resistant to other antibiotics other than cephalosporins however patient with reactions in the past. Case discussed with ID and ID pharmacist and change to Polymyxin (10/6 - ) however continues to spike fevers likely second to Polymyxin only intermediate coverage and Recarbio resistant .   - Overall difficulty with ABX tailoring given allergic rxns and resistant organisms.     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  - Monitor HH     VASCULAR   # LLE POP DVT   - US BL LE (9/10) with acute left deep vein thrombosis of the left popliteal vein.  - RUE swollen and VA DOPPLER RUE (10/3) with R IJ DVT and R brachial DVT.  - Eliquis held for permacath however procedure held.   - Eliquis remains on hold second to mild suction trauma hemoptysis.   - Resume Eliquis when able.      # HD access  - L IJ CLAUDIA (9/27 - )   - Planned for IR permacath cancelled for today and will reattempt when infectious status improves.     ONC   # Hx of Breast CA   - Patient dx in 2018 and s/p BL mastectomy (radical on right) and chemo and RT.   - Supportive care.     ENDOCRINE  # IDDM2   - Continued on NPH with ISS, however noted with hypoglycemic episodes and NPH dc'ed.    - Finger sticks trending up off NPH.   - Continue on NPH 6U with moderate ISS.   - Adjust as need     SKIN  # Drug Eruption   - Attempted cefepime (8/12) vs ancef (8/13-8/14) and then noted with drug rxn in ICU. Continue on steroids with prednisone 40 (8/18 - 9/2) then prednisone 30 (9/3-9/7), then prednisone 20 (9/8 - 9/10), then prednisone 10mg (9/11-9/13) then turn off.   - Attempted Zosyn (10/2-10/5) with possible rash. Zosyn turned off with improvement.   - Hold further cephalosporins or PCNs at this time   - Monitor for further drug rxns.     ETHICS/ GOC    - Attempted palliative discussion however family not interested and wishes for FULL CODE    DISPO - TBD

## 2023-10-09 NOTE — CONSULT NOTE ADULT - ATTENDING COMMENTS
Patient with recurrent and persistent ventilator dependent acute hypoxemic and hypercapnic respiratory failure. H/o previous trach. After addressing the goals of care, given the family’s decision to continue mechanical ventilation, patient will require tracheostomy tube placement. Family showed good understanding of the indications, contraindications, risks and benefits of percutaneous tracheostomy. Based on ventilatory requirements, neck anatomy and comorbidities, a shared decision was taken to proceed with bedside trach placement.
73F w/ intermittent episodes of epistaxis, DM on insulin, HTN, CKD, CHFpEF, breast CA s/p mastectomy, Latent Tb, respiratory failure and cardiac arrest (2018), CVA with residual weakness, aspiration PNA h/o trach and prior PEG for dysphagia in 2018 who presented with respiratory distress, GI now consulted for CGE.     On my exam, NAD, abd soft/nontender (shakes head "no" when asked if exam is painful), 100 cc dark fluid in canister on wall. HR 60s-70s, SBPs 130s-180s.  at bedside, notes that he feels any intervention may be "unsafe because she is so sick." Discussed ddx (OGT trauma, MWT, mild gastritis, mild esophagitis). Discussed that if she were to develop hemodynamic instability or hematochezia/melena she may require luminal eval but at this juncture she can be observed.     - ok for tube feeds  - PPI IV BID, transition to PO daily once extubated  - please alert us if she develops bloody or black bowel movements  - monitor hgb  - anti-emetics if needed  - if develops BRBPR and is HDUS would obtain CTA, if melena would optimize and would consider luminal eval from above at that time. Of note, one or two melenic bowel movements today or tomorrow are to be expected since we suspect she had an oozing bleed, but if she has multiple episodes of melena please alert us
pt is a 74 yo female  well known to me from her recent micu   stays , PMHX htn, hld, who developed RUSS, acute hypoxemic respiratory  failure and resistant ESBL pneumonia, now trached and pegged , on HD  who developped hypotension post hd today. asked to evaluate.  PT on the vent trach intact, lungs rhonchi b/l heart  s1s2 abdomen nontender , ext  3+ edema.    labs as above  wbc 13 hgb 8 hct 25 bicarb 24 cr 1.76      A/p   74 yo with multiple medical problems, sepsis,  trach, peg with hypotension , post hd,  -pt s/p albumin for hypotension  -continue midodrine 30 q 8 hrs as tolerated  -follow systolic goal 90  as optimal goal as diastolic low  -continue dvt prophylaxis  -monitor pulse ox  -pt is not an icu candidate.

## 2023-10-10 NOTE — PROGRESS NOTE ADULT - SUBJECTIVE AND OBJECTIVE BOX
S: No overnight events. Pt seen and examined, some intermittent eye opening today      Medications: MEDICATIONS  (STANDING):  albuterol    0.083% 2.5 milliGRAM(s) Nebulizer every 6 hours  artificial tears (preservative free) Ophthalmic Solution 1 Drop(s) Both EYES every 4 hours  aspirin  chewable 81 milliGRAM(s) Enteral Tube daily  atorvastatin 10 milliGRAM(s) Oral at bedtime  chlorhexidine 0.12% Liquid 15 milliLiter(s) Oral Mucosa every 12 hours  chlorhexidine 2% Cloths 1 Application(s) Topical daily  dextrose 5%. 1000 milliLiter(s) (50 mL/Hr) IV Continuous <Continuous>  dextrose 5%. 1000 milliLiter(s) (100 mL/Hr) IV Continuous <Continuous>  dextrose 50% Injectable 12.5 Gram(s) IV Push once  dextrose 50% Injectable 25 Gram(s) IV Push once  dextrose 50% Injectable 25 Gram(s) IV Push once  dextrose 50% Injectable 25 Gram(s) IV Push once  epoetin odalis (EPOGEN) Injectable 92278 Unit(s) IV Push <User Schedule>  ferrous    sulfate Liquid 300 milliGRAM(s) Oral daily  glucagon  Injectable 1 milliGRAM(s) IntraMuscular once  insulin lispro (ADMELOG) corrective regimen sliding scale   SubCutaneous every 6 hours  insulin NPH human recombinant 6 Unit(s) SubCutaneous every 6 hours  levETIRAcetam  Solution 1000 milliGRAM(s) Oral daily  levETIRAcetam  Solution 250 milliGRAM(s) Oral <User Schedule>  midodrine. 30 milliGRAM(s) Oral every 8 hours  pantoprazole  Injectable 40 milliGRAM(s) IV Push two times a day  petrolatum Ophthalmic Ointment 1 Application(s) Both EYES two times a day  polymyxin B IVPB 995507 Unit(s) IV Intermittent every 12 hours  sodium chloride 1 Gram(s) Oral two times a day    MEDICATIONS  (PRN):  acetaminophen   Oral Liquid .. 650 milliGRAM(s) Oral every 6 hours PRN Temp greater or equal to 38C (100.4F), Mild Pain (1 - 3)  calamine/zinc oxide Lotion 1 Application(s) Topical two times a day PRN Rash and/or Itching  dextrose Oral Gel 15 Gram(s) Oral once PRN Blood Glucose LESS THAN 70 milliGRAM(s)/deciliter  sodium chloride 0.9% lock flush 10 milliLiter(s) IV Push every 1 hour PRN Pre/post blood products, medications, blood draw, and to maintain line patency       Vitals:  Vital Signs Last 24 Hrs  T(C): 37.4 (10 Oct 2023 12:00), Max: 37.6 (10 Oct 2023 06:10)  T(F): 99.3 (10 Oct 2023 12:00), Max: 99.6 (10 Oct 2023 06:10)  HR: 116 (10 Oct 2023 16:04) (101 - 116)  BP: 103/38 (10 Oct 2023 12:00) (92/36 - 121/39)  BP(mean): --  RR: 18 (10 Oct 2023 12:00) (18 - 22)  SpO2: 98% (10 Oct 2023 16:04) (98% - 100%)    Parameters below as of 10 Oct 2023 12:00  Patient On (Oxygen Delivery Method): ventilator          Neurological Exam:  Mental Status: Eyes closed, off sedation. Does not follow commands,  + trach to vent and NGT   Cranial Nerves: PERRL slightly sluggish, L subconjunctival hemorrhage  Motor: Moves upper extremities spontaneously, tremor R > L  no movement noted in lowers  Sensation: WD to noxious x4    I personally reviewed the below data/images/labs:     LABS:                          7.5    12.44 )-----------( 295      ( 10 Oct 2023 10:51 )             23.5     10-10    131<L>  |  96<L>  |  31<H>  ----------------------------<  194<H>  3.5   |  25  |  1.51<H>    Ca    8.8      10 Oct 2023 10:51  Phos  1.9     10-10  Mg     1.80     10-10    TPro  5.6<L>  /  Alb  2.6<L>  /  TBili  0.4  /  DBili  <0.2  /  AST  67<H>  /  ALT  40<H>  /  AlkPhos  368<H>  10-10    LIVER FUNCTIONS - ( 10 Oct 2023 10:51 )  Alb: 2.6 g/dL / Pro: 5.6 g/dL / ALK PHOS: 368 U/L / ALT: 40 U/L / AST: 67 U/L / GGT: x             Urinalysis Basic - ( 10 Oct 2023 10:51 )    Color: x / Appearance: x / SG: x / pH: x  Gluc: 194 mg/dL / Ketone: x  / Bili: x / Urobili: x   Blood: x / Protein: x / Nitrite: x   Leuk Esterase: x / RBC: x / WBC x   Sq Epi: x / Non Sq Epi: x / Bacteria: x            < from: CT Head No Cont (08.15.23 @ 17:20) >    ACC: [id number] EXAM:  CT BRAIN   ORDERED BY: MINAL SAM     PROCEDURE DATE:  08/15/2023          INTERPRETATION:  Clinical indication: Change in neuro exam.    Multiple axial sections were performed from base to vertex without   contrast enhancement. Coronal and sagittal reconstructions were   reformatted well.    This exam is compared prior head CT performed on August 11, 2023    Parenchymal volume loss and chronic microvessel ischemic changes are   again seen.    Abnormal low-attenuation involving the left occipital cortical   subcortical region is again seen. 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    No evidence of acute hemorrhage mass or mass effect is seen.    Evaluation of the osseous structures with appropriate window demonstrates   sclerotic changes about the left mastoid region which appears stable.   Opacification left middle ear region is again seen.    Patient is status post bilateral cataract surgery.    Impression: Stable exam.    < end of copied text >    vEEG:    Clinical Impression:  - Severe diffuse non-specific cerebral dysfunction  - There were no epileptiform abnormalities or seizures recorded.        CTH 8/11:    VENTRICLES AND SULCI: Age-appropriate involutional change  INTRA-AXIAL:  Old left PCA infarct as seen on the prior unchanged.   Microvascular ischemic changes involving the periventricular and   subcortical white matter as seen previously  EXTRA-AXIAL:  No mass or collection is seen.  VISUALIZED SINUSES:  Clear.  VISUALIZED MASTOIDS: Left mastoid sclerosis  CALVARIUM: Infiltrative appearance tothe calvarium may be indicative of   marrow infiltration on the basis of patient's known diagnosis of breast   cancer. MISCELLANEOUS:  None.    IMPRESSION:  No significant interval change compared with 7/17/2023 in   left PCA infarct which is old. Microvascular ischemic changes involving   the periventricular and subcortical white matter as seen   previously.Questionable lesions at the level of the calvarium related to   possible breast CA. Clinical correlation recommended.    --- End of Report ---      ct< from: CT Head No Cont (09.26.23 @ 21:02) >  IMPRESSION: Vague questionable areas of hypoattenuation within the   bilateral cerebellar hemispheres which may be compatible with   acute/subacute ischemia. Recommend further evaluation with a brain MRI   study, provided there are no MRI contraindications.    No acute intracranial hemorrhage.    Previously seen questionable vague wedge-shaped area of hypoattenuation   in the left parietal lobe with artifactual secondary to motion and volume   averaging with a prominent sulcus.    Chronic left occipital lobe infarct.    < end of copied text >    < from: CT Head No Cont (10.03.23 @ 20:46) >    IMPRESSION:    No acute intracranial hemorrhage or mass effect. Evaluation of the   posterior fossa degraded by streak artifact from dental amalgam.   Lucencies in the bilateral cerebellum may be artifactual.    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    Bilateral middle ear and mastoid effusions which are also seen on prior   exam.    EEG Classification / Summary:  Abnormal EEG in the awake, drowsy states.   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   -No seizures

## 2023-10-10 NOTE — PROGRESS NOTE ADULT - ASSESSMENT
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 While in MICU patient noted with worsening renal failure requiring HD initiation, CGE/ Melena s/p EGD 8/31 found with esophagitis and erosive gastropathy, new right cerebellar infarct and fevers second to ESBL COLI and MSSA VAP, MSSA bacteremia, left ear otitis externa and drug rxn to cephalosporins Course further complicated by prolonged vent time s/p tracheostomy 9/1 and transferred to RCU 9/3 completed by recurrent fevers with high PIP, respiratory acidosis and concern for volume overloaded.   CTH repeated 9/26 for concern for encephalopathy - has known now - chronic bilateral cerebellar infarcts, L PCA infarct. L parietal hypodensity seen on prior CT likely artifactual  Repeat CTH 10/3 - unchanged  EEG 10/5 with L posterocentral/temporal spikes/sharp waves - doubt true focal seizure upon further review of EEG     Impression:   ? Focal seizure - has hx of bilateral R > L tremors   Metabolic encephalopathy related to shock, infection, doubt new stroke  L PCA stroke, ESUS s/p ILR, also with bilateral centrum semiovale watershed infarcts   Multiple embolic strokes on MRI 8/17 - R cerebellum, midbrain, multiple other tiny embolic infarcts.   Dementia   MSSA bacteremia, r/o endocarditis s/p Daptomycin  Acute popliteal DVT on Eliquis   Anemia     Recommendations   [] has multiple areas of epileptogenic potential on EEG, no clear seizure correlate seen. On Keppra but no improvement in mental status  [] consider MRI brain once stable  [] mgmt of hypotension per piumary team, remains full code  [] Keppra 500mg BID with extra 250mg after HD on HD days.  [] Abx per ID  [] New CTH from 9/26 and 10/3 without any evidence of acute infarct -> encephalopathy likely related to underlying metabolic derangements.  [] GI following for coffee ground emesis - esophagitis seen on colonoscopy, on ELiquis  [] family declined kidney biopsy for AIN -> s/p steroid tx   []  AC on hold given anemia and bleeding, resume when able. Now on ASA  [] C/w home Atorvastatin 10 mg qhs   [] would interrogate ILR and review tele to see if pAF -. no AF seen  [] continue to address GOC with family as pts  and daughter continue to remain hopeful    d/w daughter    Katty Charles DO  Vascular Neurology  Office [phone]

## 2023-10-10 NOTE — PROGRESS NOTE ADULT - NS ATTEND AMEND GEN_ALL_CORE FT
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 ESRD s/p ICU and intubation. Course further c/b GIB s/p EGD, new R cerebellar infarct, and pneumonia/bacteremia, and prolonged respiratory failure require tracheostomy.       #NEURO  Previous episodes of possible seizure, EEG with possible focal seizure with RUE twitching and at risk of focal onset seizures from multiple locations, especially left posterior temporal/posterocentral region, likely due to encephalomalacia/gliosis from prior left parietal CVA. Also with underlying encephalopathy due to acute illnes + RUSS + sepsis + history of CVA's (MICU course complicated by new R cerebellar, midbrain, and multiple tiny embolic infarcts as well as known left PCA CVA). Repeat CT head on 10/3 with no acute changes. Ammonia normal. BUN improved. Continue vEEG monitoring.  - Started on levetiracetam.   - no evidence of seizure today     #Renal  - Trial of HD today   with fluid removal per renal. Hold HD if hypotensive, midodrine to be given prior   - grossly fluid overloaded on exam   - stable H/H. Anemia of CKD, continue Epoetin per nephrology.     #ID  - Repeat sputum culture with numerous carbapenem-resistant Pseudomonas aeruginosa. However, she is allergic to PCNs and cephalosporins. Appreciate ID follow-up, currently on Polymyxin B. Appreciate ENT follow-up for left ear otitis externa, which has now resolved. Follow-up ophthalmology regarding keratitis + chemosis, on Lacrilube and artificial tears.   - ongoing sepsis with no better antibiotic options given allergies and resistance profile  - appreciate ID input    #CVS  - hypotensive, Continue midodrine 30 mg q8h.  -  Continue aspirin and statin given h/o CVA  - I was informed this evening that patient has been persistently hypotensive since HD completed. ICU consulted for evaluation for pressors/higher level of care   - On apixaban for DVT.  apixaban held on 10/7 for Permacath. continue to hold given bloody secretions. I informed family and they are aware that as such she is at risk for further clots.   - will resume apixaban unless planned for permacath      #Pulm   - Continue lung protective ventilation.   - Airway clearance therapy with albuterol  - Bloody secretions noted upon suction on 10/9- FiO2 requirements unchanged. Will discontinue hypertonic saline, continue albuterol. Monitor closely, if develops antonina hemoptysis start TXA nebs 500 mg q8h standing for 72 hrs  - no further hemoptysis today     #GI  tube feeds as tolerates. PPI for GI ppx.  - check LFTs    #GOALS OF CARE   Goals of care meeting with daughter and , BETTINA Candelario and Nurse Manager present on 10/9/ We discussed Ms. Barraza's current clinical condition detailing management above. All questions from family answered. I informed family that despite all our efforts she is extremely ill and at risk for further deterioration. They understand and wish to pursue all possible medical interventions. She will remain full code.

## 2023-10-10 NOTE — PROGRESS NOTE ADULT - PROBLEM SELECTOR PLAN 1
Multifactorial     - Aspiration, acute on chronic diastolic CHF   s/p trach 9/1  now with likely sepsis   restarted Abx  Prognosis very poor. Discussed with  at bedside

## 2023-10-10 NOTE — PROGRESS NOTE ADULT - ASSESSMENT
74 y/o F well known to me from my \[facility] [place] [facility]\"" outpt practice. she was admitted at [facility] 7/12-7/22 w aspiration PNA, was treated w CEFEPIME, developed an allergic rash,  dCHF, + MAC on AFB culture, had been progressively getting more and more lethargic and dyspneic at home since DC.   In  am of 8/11/23  ptn presented with respiratory distress w hypoxia and hypercarbia requiring intubation 2/2 volume overload +/- Asp PNA      Neuro   responds appropriately   Baseline MS AOx3, aphasic   - h/o CVA , on aspirin and statin . resumed w feeding tube, ASA resumed 8/26  - eeg  2/2 tremors, no sz focus  - less responsive in the past few days  - MRI 8/17:  new R cerebellar infarct, old left PCA/Occipital infarct. 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 Mcnulty, now in MICU  s/p septic shock overnight 9/1, now on meropenem, HDS  s/p trache, on vent support, copious secretions      Renal     Ptn well know to Dr. Colon,following   HD 8/19,  8/21, 8/26 and 8/28.  s/p PUF 8/29 2.5 Liters, HD 8/30,  9/1, 9/4  L IJ HD placed  uremic  hyponatremic  HD as per renal        Hypotension  - Levo drip  prognosis is poor  consider palliative care consult    GI  NPO  on tube feeds  coffee ground emesis x 1 8/24, melena overnight 8/31, s/p 1UPRBC, EGD/Colonoscopy: erosive gastropathy, esophagisits, on colonoscopy old blood seen no active bleeding, poor prep  family to decide re PEG placement    Endocrine  T2DM   A1C 7.1 in 7/2023   - BG goal 140-200     ID   No fever/leukocytosis, recheck temp   Hx latent TB which was treated, no concern for TB   - grew MAC on AFB culture from most recent admission. at [facility]  -MSSA Bacteremia 9/1, allergic to cephalosprins and PCN, on Vanco as per ID, renal dosing,   - sputum also grew E.Coli-ESBL, as per ID recs for  Bradford through 9/7( 1 week course as per ID) , afebrile.  - 9/8:  spike  temp 38.1C, has O. externa, has a wick, recs are to get a CT to R/O an abscess/bony involvement. cont Meropenem  9/9: ptn w fever overnight to 100.8,  may need shiley pulled if bacteremic, needs NECK CT as per ENT to R/O Mastoid bone involvement while having ongoing purulent DC from L ear 2/2 O. externa, getting daily wick changes  9/10:  spiked 102 overnight through Bradford and Vanco, purulent DC from O. externa, ENT recommend Ct of mastoid. ptn in HD, has Shiley in place, consider pulling shiley and send for Cx. would d/w Renal, and consider contacting  ID, last seen by Dr. Conner 9/6 9/11: remains febrile, Dr. Conner reconsulted. cont Bradford, Vanco  9/12: tmax 100.5, fever curve is improving, awaiting Left ear CT, on Bradford since 9/1. on  vanco for MSSA Bacteremia, does not tolerate cephalosporins. through 10/2  9/13: on full vent support via trache, appears uncomfortable and onur 2/2 Left O. externa. has an incidental left middle ear tumor, no acute middle ear interventions recommended, left ear wick replaced. on Meropenem, cx from Ear DC is neg. On Vanco for MSSA bacteremia through 10/2, course of Meropenem as per ID, afebrile in the past 24 hrs . Cardura for HTN resumed, HGB dropped to 6.4, got a dose of EPO and 1UPRBC, rpt 7.8, remains on HEPARIN drip for LEFT popliteal DVT  9/14: cont on Mupirocin locally to Left ear, cont IV Bradford, ENT signed off, afebrile x 48 hrs, Mro course to be determined by ID, on Vanco for MSSA bacteremia through 10/2. ongoing worsening hyponatremia, Hypertonic saline as per renal, no HD today, s/p 1UPRBC 9/13  9/15: spiking temps again, if cont to spike as per ID re PAN scan. cont Vanco for MSSA Bacteremia  9/16-18: no temp overnight  afebrile, cont to have Left ear DC,   on Vanco and ,bradford through 10/2  On IV Fluconazole for fungal cx+ from O. externa Discharge.   fluconazole started 9/21, ptn developed an allergic rash on 9/22. doubt its 2/2 to MEROPENEM or Vanco.  MEropenem was DCed 2/2 causing possible drug rash.  on VANCO based on levels for MSSA bacteremia but DCed 2/2 poss drug rash, now on Daptomycin  9/29: ptn is septic, meropenem resumed, s/p HD 9/28, next HD tomorrow    Heme/Onc  ppx: place on SCD  Transfuse for hgb 6.4, no obv bleed. HDS  LEFT POPLITEAL VEIN ACUTE DVT on 9/10, on Heparin drip    Ethics  GOC - Discussed GOC with daughter and , they have opted in the past for full code. and she remains full code at present      9/27:  In Mic[facility], R IJ HD catheter placed, NGT in place, acidotic on VBG, trache to vent, anasarca, on IV BUMEX, on Levo, on Heparin drip, on stress dose HYdrocortisone, FS in range, uremic, awaiting HD, CT C/A/P w no acute findings. VANCO Dced , now on Dapto, for MSSA baceteremia through 10/2    9/28: HD started 9/27, still encephalopathic, fluconazole DCed as it could be the cause of the rash. on Dapto to complete a course of Abx for MSSA bacteremia through 10/2, VANCO DCed 2/2 possible rash. off pressors    9/30: sepsis resolved, off fluconazole, would add FLUCONAZOLE to the allergy list. on Meropenem, on Dapto through 10/2 instead of Vanco.Vanco was DCed as preseumed ptn had an allergic rash to Vanco. she is not allergic to Vanco. vent dependent. tolerating HD. s/p HD today, next on 10/3    10/1: back in RCU, trach to vent,  tolerating HD well, on Bradford, Dapto, off fluconazole 2/2 allergic rxn    10/2:  trache to vent, completed a course of ABx for MSSA bacteremia TODAY, sputum grew P. aeruginosa resistant to MEROPENEM, ptn was switched to ZOSYN. 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 copious secretions. temp last night. ptn got 2 gm dose of AZACTAM today at 6 pm, ptn seen at 8 pm, has facial swelling and erythema, no stridor, ID made aware. Daughter and  at bedside state the allergic rash post ZOsyn had improved after DC zosyn. last dose of ZOsyn today at 2 pm, prior to that 11 am.   may need to switch to Amikacin if reaction will cont on Azactam. cont Benadryl. rpt Head ct tonight to r/o new CVA. HD as per renal    10/4: HD today, as per renal. no fever overnight, remains on AZTREONAM, rash resolved, next dose tonight    10/5: On aztreonam, afebrile, facial erythema is mild, prob resolving from ZOSYN exposure. on HD as per renal. trache to vent. O. externa resolved. 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 aeruginosa w Interm sens to Aztreonam, now DCed, started on Polymyxin, additional sensitivities being tested as per ID    10/7:  ptn cont to spike temps, FS are above 250, on polymyxin B for P. aeruginosa in sputum, trache to vent, has thick secretions. off eliquis in preparation to convert shiley to permacath in IR on 10/10  10/8: fever ongoing, no further ABx change recommended, ID recs: bronch. is this fever 2/2 worsening BRYAN ???  10/9:  long conversation w daughter and  re hospital course, prognosis and possibility of transfer to [facility]. they are discouraged re the prognosis they are given by Pulm , neuro, ID. they feel she improved post respiratory failure when hospitalized at [facility] in the past. i explained that she is much sicker now, on HD, s/p additional stroke, numerous aspiration PNAs, numerous allergies to Abx, DVT. they are hoping she will get bronchial lavage as recommended by ID

## 2023-10-10 NOTE — PROGRESS NOTE ADULT - SUBJECTIVE AND OBJECTIVE BOX
NEPHROLOGY     Patient seen and examined with spouse at bedside, pt trach to paula, HD yesterday removed 1.7L, noted hypotensive last night, received albumin 200cc IV with slight improvement in BP.     MEDICATIONS  (STANDING):  albuterol    0.083% 2.5 milliGRAM(s) Nebulizer every 6 hours  artificial tears (preservative free) Ophthalmic Solution 1 Drop(s) Both EYES every 4 hours  aspirin  chewable 81 milliGRAM(s) Enteral Tube daily  atorvastatin 10 milliGRAM(s) Oral at bedtime  chlorhexidine 0.12% Liquid 15 milliLiter(s) Oral Mucosa every 12 hours  chlorhexidine 2% Cloths 1 Application(s) Topical daily  dextrose 5%. 1000 milliLiter(s) (100 mL/Hr) IV Continuous <Continuous>  dextrose 5%. 1000 milliLiter(s) (50 mL/Hr) IV Continuous <Continuous>  dextrose 50% Injectable 25 Gram(s) IV Push once  dextrose 50% Injectable 12.5 Gram(s) IV Push once  dextrose 50% Injectable 25 Gram(s) IV Push once  dextrose 50% Injectable 25 Gram(s) IV Push once  epoetin odalis (EPOGEN) Injectable 08193 Unit(s) IV Push <User Schedule>  ferrous    sulfate Liquid 300 milliGRAM(s) Oral daily  glucagon  Injectable 1 milliGRAM(s) IntraMuscular once  insulin lispro (ADMELOG) corrective regimen sliding scale   SubCutaneous every 6 hours  insulin NPH human recombinant 6 Unit(s) SubCutaneous every 6 hours  levETIRAcetam  Solution 1000 milliGRAM(s) Oral daily  levETIRAcetam  Solution 250 milliGRAM(s) Oral <User Schedule>  midodrine. 30 milliGRAM(s) Oral every 8 hours  pantoprazole  Injectable 40 milliGRAM(s) IV Push two times a day  petrolatum Ophthalmic Ointment 1 Application(s) Both EYES two times a day  polymyxin B IVPB 323473 Unit(s) IV Intermittent every 12 hours  sodium chloride 1 Gram(s) Oral two times a day    VITALS:  T(C): , Max: 37.6 (10-10-23 @ 06:10)  T(F): , Max: 99.6 (10-10-23 @ 06:10)  HR: 109 (10-10-23 @ 11:23)  BP: 121/39 (10-10-23 @ 10:06)  BP(mean): --  RR: 18 (10-10-23 @ 08:00)  SpO2: 98% (10-10-23 @ 11:23)    PHYSICAL EXAM:  Constitutional: minimally communicative at present; on vent  HEENT: trach-vent, (+)NGT  Respiratory: coarse BS b/l  Cardiovascular: tachy reg s1s2  Gastrointestinal: BS+, soft, NT/ND  Extremities: + peripheral edema  Neurological: tone WNL  Skin: No rashes  Access: (+)[facility]    LABS:                        7.5    12.44 )-----------( 295      ( 10 Oct 2023 10:51 )             23.5     10-10    131<L>  |  96<L>  |  31<H>  ----------------------------<  194<H>  3.5   |  25  |  1.51<H>    Ca    8.8      10 Oct 2023 10:51  Phos  1.9     10-10  Mg     1.80     10-10    TPro  5.6<L>  /  Alb  2.6<L>  /  TBili  0.4  /  DBili  <0.2  /  AST  67<H>  /  ALT  40<H>  /  AlkPhos  368<H>  10-10

## 2023-10-10 NOTE — PROGRESS NOTE ADULT - SUBJECTIVE AND OBJECTIVE BOX
Follow Up:  MSSA bacteremia    Interval History: pt afebrile but had hypotension again after HD,  WBC down to 12        Allergies  isoniazid (Rash)  nafcillin (Unknown)  hydrALAZINE (Rash)  vitamin E (Short breath; Urticaria; Hives)  doxycycline (Rash)  cefepime (Rash)  NIFEdipine (Urticaria; Hives)        ANTIMICROBIALS:  polymyxin B IVPB 681005 every 12 hours      OTHER MEDS:  acetaminophen   Oral Liquid .. 650 milliGRAM(s) Oral every 6 hours PRN  albuterol    0.083% 2.5 milliGRAM(s) Nebulizer every 6 hours  artificial tears (preservative free) Ophthalmic Solution 1 Drop(s) Both EYES every 4 hours  aspirin  chewable 81 milliGRAM(s) Enteral Tube daily  atorvastatin 10 milliGRAM(s) Oral at bedtime  calamine/zinc oxide Lotion 1 Application(s) Topical two times a day PRN  chlorhexidine 0.12% Liquid 15 milliLiter(s) Oral Mucosa every 12 hours  chlorhexidine 2% Cloths 1 Application(s) Topical daily  dextrose 5%. 1000 milliLiter(s) IV Continuous <Continuous>  dextrose 5%. 1000 milliLiter(s) IV Continuous <Continuous>  dextrose 50% Injectable 12.5 Gram(s) IV Push once  dextrose 50% Injectable 25 Gram(s) IV Push once  dextrose 50% Injectable 25 Gram(s) IV Push once  dextrose 50% Injectable 25 Gram(s) IV Push once  dextrose Oral Gel 15 Gram(s) Oral once PRN  epoetin odalis (EPOGEN) Injectable 31895 Unit(s) IV Push <User Schedule>  ferrous    sulfate Liquid 300 milliGRAM(s) Oral daily  glucagon  Injectable 1 milliGRAM(s) IntraMuscular once  insulin lispro (ADMELOG) corrective regimen sliding scale   SubCutaneous every 6 hours  insulin NPH human recombinant 6 Unit(s) SubCutaneous every 6 hours  levETIRAcetam  Solution 1000 milliGRAM(s) Oral daily  levETIRAcetam  Solution 250 milliGRAM(s) Oral <User Schedule>  midodrine. 30 milliGRAM(s) Oral every 8 hours  pantoprazole  Injectable 40 milliGRAM(s) IV Push two times a day  petrolatum Ophthalmic Ointment 1 Application(s) Both EYES two times a day  sodium chloride 1 Gram(s) Oral two times a day  sodium chloride 0.9% lock flush 10 milliLiter(s) IV Push every 1 hour PRN      Vital Signs Last 24 Hrs  T(C): 37.4 (10 Oct 2023 12:00), Max: 37.6 (10 Oct 2023 06:10)  T(F): 99.3 (10 Oct 2023 12:00), Max: 99.6 (10 Oct 2023 06:10)  HR: 106 (10 Oct 2023 12:00) (101 - 116)  BP: 103/38 (10 Oct 2023 12:00) (92/36 - 121/39)  BP(mean): --  RR: 18 (10 Oct 2023 12:00) (18 - 22)  SpO2: 100% (10 Oct 2023 12:00) (98% - 100%)    Parameters below as of 10 Oct 2023 12:00  Patient On (Oxygen Delivery Method): ventilator        Physical Exam:  General:    trached   Respiratory:   trach on vent  abd:  distended but soft  :  no  willard  Musculoskeletal : generalized edema  Skin: no rash now  vascular: TRISHA odom                                 7.5    12.44 )-----------( 295      ( 10 Oct 2023 10:51 )             23.5       10-10    131<L>  |  96<L>  |  31<H>  ----------------------------<  194<H>  3.5   |  25  |  1.51<H>    Ca    8.8      10 Oct 2023 10:51  Phos  1.9     10-10  Mg     1.80     10-10    TPro  5.6<L>  /  Alb  2.6<L>  /  TBili  0.4  /  DBili  <0.2  /  AST  67<H>  /  ALT  40<H>  /  AlkPhos  368<H>  10-10      Urinalysis Basic - ( 10 Oct 2023 10:51 )    Color: x / Appearance: x / SG: x / pH: x  Gluc: 194 mg/dL / Ketone: x  / Bili: x / Urobili: x   Blood: x / Protein: x / Nitrite: x   Leuk Esterase: x / RBC: x / WBC x   Sq Epi: x / Non Sq Epi: x / Bacteria: x        MICROBIOLOGY:  v  .Blood Blood-Venous  10-09-23   No growth at 24 hours  --  --      Trach Asp Tracheal Aspirate  10-03-23   Numerous Pseudomonas aeruginosa (Carbapenem Resistant) Susceptibility to  follow.  Normal Respiratory Cate absent  --  Pseudomonas aeruginosa (Carbapenem Resistant)      .Blood Blood-Venous  09-29-23   No growth at 5 days  --  --      .Blood Blood-Peripheral  09-29-23   No growth at 5 days  --  --      ET Tube ET Tube  09-29-23   Rare Yeast  --  --      ET Tube ET Tube  09-29-23   Numerous Proteus mirabilis  Moderate Pseudomonas aeruginosa (Carbapenem Resistant)  Normal Respiratory Cate absent  --  Proteus mirabilis  Pseudomonas aeruginosa (Carbapenem Resistant)      Clean Catch Clean Catch (Midstream)  09-26-23   10,000 - 49,000 CFU/mL Candida albicans "Susceptibilities not performed"  --  --      .Blood Blood-Peripheral  09-26-23   No growth at 5 days  --  --      .Blood Blood-Venous  09-20-23   No growth at 5 days  --  --      .Blood Blood-Peripheral  09-20-23   No growth at 5 days  --  --      Ear Ear  09-13-23   Moderate Candida albicans "Susceptibilities not performed"  --  --      .Blood Blood-Venous  09-10-23   No growth at 5 days  --  --                RADIOLOGY:  Images independently visualized and reviewed personally, findings as below  < from: Xray Chest 1 View- PORTABLE-Urgent (Xray Chest 1 View- PORTABLE-Urgent .) (10.08.23 @ 10:41) >  IMPRESSION:  Slightly improved pulmonary edema.    < end of copied text >  < from: CT Head No Cont (10.03.23 @ 20:46) >  IMPRESSION:    No acute intracranial hemorrhage or mass effect. Evaluation of the   posterior fossa degraded by streak artifact from dental amalgam.   Lucencies in the bilateral cerebellum may be artifactual.    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    Bilateral middle ear and mastoid effusions which are also seen on prior   exam.    < end of copied text >

## 2023-10-10 NOTE — PROGRESS NOTE ADULT - SUBJECTIVE AND OBJECTIVE BOX
Patient is a 73y old  Female who presents with a chief complaint of Respiratory distress (10 Oct 2023 19:09)      SUBJECTIVE / OVERNIGHT EVENTS: trache to vent, has hypotensive episodes, plan for HD tomorrow    MEDICATIONS  (STANDING):  albuterol    0.083% 2.5 milliGRAM(s) Nebulizer every 6 hours  artificial tears (preservative free) Ophthalmic Solution 1 Drop(s) Both EYES every 4 hours  aspirin  chewable 81 milliGRAM(s) Enteral Tube daily  atorvastatin 10 milliGRAM(s) Oral at bedtime  chlorhexidine 0.12% Liquid 15 milliLiter(s) Oral Mucosa every 12 hours  chlorhexidine 2% Cloths 1 Application(s) Topical daily  dextrose 5%. 1000 milliLiter(s) (50 mL/Hr) IV Continuous <Continuous>  dextrose 5%. 1000 milliLiter(s) (100 mL/Hr) IV Continuous <Continuous>  dextrose 50% Injectable 12.5 Gram(s) IV Push once  dextrose 50% Injectable 25 Gram(s) IV Push once  dextrose 50% Injectable 25 Gram(s) IV Push once  dextrose 50% Injectable 25 Gram(s) IV Push once  epoetin odalis (EPOGEN) Injectable 53884 Unit(s) IV Push <User Schedule>  ferrous    sulfate Liquid 300 milliGRAM(s) Oral daily  glucagon  Injectable 1 milliGRAM(s) IntraMuscular once  insulin lispro (ADMELOG) corrective regimen sliding scale   SubCutaneous every 6 hours  insulin NPH human recombinant 6 Unit(s) SubCutaneous every 6 hours  levETIRAcetam  Solution 1000 milliGRAM(s) Oral daily  levETIRAcetam  Solution 250 milliGRAM(s) Oral <User Schedule>  midodrine. 30 milliGRAM(s) Oral every 8 hours  pantoprazole  Injectable 40 milliGRAM(s) IV Push two times a day  petrolatum Ophthalmic Ointment 1 Application(s) Both EYES two times a day  polymyxin B IVPB 176328 Unit(s) IV Intermittent every 12 hours  sodium chloride 1 Gram(s) Oral two times a day    MEDICATIONS  (PRN):  acetaminophen   Oral Liquid .. 650 milliGRAM(s) Oral every 6 hours PRN Temp greater or equal to 38C (100.4F), Mild Pain (1 - 3)  calamine/zinc oxide Lotion 1 Application(s) Topical two times a day PRN Rash and/or Itching  dextrose Oral Gel 15 Gram(s) Oral once PRN Blood Glucose LESS THAN 70 milliGRAM(s)/deciliter  sodium chloride 0.9% lock flush 10 milliLiter(s) IV Push every 1 hour PRN Pre/post blood products, medications, blood draw, and to maintain line patency      Vital Signs Last 24 Hrs  T(F): 99.3 (10-10-23 @ 12:00), Max: 99.6 (10-10-23 @ 06:10)  HR: 106 (10-10-23 @ 19:33) (101 - 116)  BP: 103/38 (10-10-23 @ 12:00) (103/38 - 121/39)  RR: 18 (10-10-23 @ 12:00) (18 - 22)  SpO2: 99% (10-10-23 @ 19:33) (98% - 100%)  Telemetry:   CAPILLARY BLOOD GLUCOSE      POCT Blood Glucose.: 212 mg/dL (10 Oct 2023 17:26)  POCT Blood Glucose.: 225 mg/dL (10 Oct 2023 11:31)  POCT Blood Glucose.: 245 mg/dL (10 Oct 2023 05:40)  POCT Blood Glucose.: 260 mg/dL (10 Oct 2023 00:14)    I&O's Summary    09 Oct 2023 07:01  -  10 Oct 2023 07:00  --------------------------------------------------------  IN: 880 mL / OUT: 2067 mL / NET: -1187 mL    10 Oct 2023 07:01  -  10 Oct 2023 22:01  --------------------------------------------------------  IN: 480 mL / OUT: 0 mL / NET: 480 mL        PHYSICAL EXAM:  GENERAL: NAD, well-developed  HEAD:  Atraumatic, Normocephalic  EYES: EOMI, PERRLA, conjunctiva and sclera clear  NECK: Supple, No JVD  CHEST/LUNG: Clear to auscultation bilaterally; No wheeze  HEART: Regular rate and rhythm; No murmurs, rubs, or gallops  ABDOMEN: Soft, Nontender, Nondistended; Bowel sounds present  EXTREMITIES:  2+ Peripheral Pulses, No clubbing, cyanosis, or edema  PSYCH: AAOx3  NEUROLOGY: non-focal  SKIN: No rashes or lesions    LABS:                        7.5    12.44 )-----------( 295      ( 10 Oct 2023 10:51 )             23.5     10-10    131<L>  |  96<L>  |  31<H>  ----------------------------<  194<H>  3.5   |  25  |  1.51<H>    Ca    8.8      10 Oct 2023 10:51  Phos  1.9     10-10  Mg     1.80     10-10    TPro  5.6<L>  /  Alb  2.6<L>  /  TBili  0.4  /  DBili  <0.2  /  AST  67<H>  /  ALT  40<H>  /  AlkPhos  368<H>  10-10          Urinalysis Basic - ( 10 Oct 2023 10:51 )    Color: x / Appearance: x / SG: x / pH: x  Gluc: 194 mg/dL / Ketone: x  / Bili: x / Urobili: x   Blood: x / Protein: x / Nitrite: x   Leuk Esterase: x / RBC: x / WBC x   Sq Epi: x / Non Sq Epi: x / Bacteria: x        RADIOLOGY & ADDITIONAL TESTS:    Imaging Personally Reviewed:    Consultant(s) Notes Reviewed:      Care Discussed with Consultants/Other Providers:

## 2023-10-10 NOTE — CHART NOTE - NSCHARTNOTEFT_GEN_A_CORE
Source: Patient A&Ox [ 0]    Family [x ]     other [x ] Chart Review     Medical Course: 74 y/o F well known to me from my [facility] outpt practice. she was admitted at [facility] 7/12-7/22 w aspiration PNA, was treated w CEFEPIME, developed an allergic rash,  dCHF, + MAC on AFB culture, had been progressively getting more and more lethargic and dyspneic at home since DC. In am of 8/11/23  ptn presented with respiratory distress w hypoxia and hypercarbia requiring intubation 2/2 volume overload +/- Asp PNA.     Nutrition Course: Patient seen at bedside with decreased cognition, unable to obtain nutrition information via pt. Daughter at bedside provided collateral, who reports pt's TF formula has changed to Glucerna 1.5 this morning. As per Diet order, pt's TF formula was changed since last night (10/9).  Daughter reports pt is tolerating current formula so far. Denies any GI distress (nausea/vomiting/diarrhea/constipation.) Last BM 10/9 per RN flow sheet. At the time of visit, Pt's TF Glucerna 1.5 visibly running at goal rate 40ml/hr. Pt was on Nepro @ 40ml/hr prior to this. Formula was changed to Glucerna as per Nephro recommendation as per Nephro note dated on 10/9.  Current TF regimen Glucerna 1.5 @ rate of 40ml/hr provides total volume 960ml/day, 1440kcal, 79gm pro which not meeting pt's nutritional needs. Recommend increase rate to 50ml/hr to provide total 1200ml/day, 1800kcal, 99gm pro, 910.8 ml of free water. Recommend d/c Prosource 1pk daily.  Pt's recent labs notable with low Na 131L, Phos 1.9L, Cl96L. Sodium chloride ordered for repletion. Recommend consider monitor and replete Phos as per medical discretion. Pt noted with GOC initiated with family on 10/9, remains on full code at this time. RD to remain available for further nutritional interventions as indicated.        Diet, NPO with Tube Feed:   Tube Feeding Modality: Nasogastric  Glucerna 1.5 Judd (GLUCERNA1.5RTH)  Total Volume for 24 Hours (mL): 960  Continuous  Starting Tube Feed Rate {mL per Hour}: 40  Until Goal Tube Feed Rate (mL per Hour): 40  Tube Feed Duration (in Hours): 24  Tube Feed Start Time: 17:30  No Carb Prosource (1pkg = 15gms Protein)     Qty per Day:  1 (10-09-23 @ 17:27)      GI: WDL. Last BM 10/9     Anthropometrics: Height (cm): 154.9 (08-11)  Weight (kg): 66.5 (08-11)   BMI (kg/m2): 27.7 (08-11)    Edema: 3+ generalized edema   Pressure Injuries: None reported at present.     __________________ Pertinent Medications__________________   MEDICATIONS  (STANDING):  albuterol    0.083% 2.5 milliGRAM(s) Nebulizer every 6 hours  artificial tears (preservative free) Ophthalmic Solution 1 Drop(s) Both EYES every 4 hours  aspirin  chewable 81 milliGRAM(s) Enteral Tube daily  atorvastatin 10 milliGRAM(s) Oral at bedtime  chlorhexidine 0.12% Liquid 15 milliLiter(s) Oral Mucosa every 12 hours  chlorhexidine 2% Cloths 1 Application(s) Topical daily  dextrose 5%. 1000 milliLiter(s) (50 mL/Hr) IV Continuous <Continuous>  dextrose 5%. 1000 milliLiter(s) (100 mL/Hr) IV Continuous <Continuous>  dextrose 50% Injectable 12.5 Gram(s) IV Push once  dextrose 50% Injectable 25 Gram(s) IV Push once  dextrose 50% Injectable 25 Gram(s) IV Push once  dextrose 50% Injectable 25 Gram(s) IV Push once  epoetin odalis (EPOGEN) Injectable 64138 Unit(s) IV Push <User Schedule>  ferrous    sulfate Liquid 300 milliGRAM(s) Oral daily  glucagon  Injectable 1 milliGRAM(s) IntraMuscular once  insulin lispro (ADMELOG) corrective regimen sliding scale   SubCutaneous every 6 hours  insulin NPH human recombinant 6 Unit(s) SubCutaneous every 6 hours  levETIRAcetam  Solution 1000 milliGRAM(s) Oral daily  levETIRAcetam  Solution 250 milliGRAM(s) Oral <User Schedule>  midodrine. 30 milliGRAM(s) Oral every 8 hours  pantoprazole  Injectable 40 milliGRAM(s) IV Push two times a day  petrolatum Ophthalmic Ointment 1 Application(s) Both EYES two times a day  polymyxin B IVPB 740709 Unit(s) IV Intermittent every 12 hours  sodium chloride 1 Gram(s) Oral two times a day    MEDICATIONS  (PRN):  acetaminophen   Oral Liquid .. 650 milliGRAM(s) Oral every 6 hours PRN Temp greater or equal to 38C (100.4F), Mild Pain (1 - 3)  calamine/zinc oxide Lotion 1 Application(s) Topical two times a day PRN Rash and/or Itching  dextrose Oral Gel 15 Gram(s) Oral once PRN Blood Glucose LESS THAN 70 milliGRAM(s)/deciliter  sodium chloride 0.9% lock flush 10 milliLiter(s) IV Push every 1 hour PRN Pre/post blood products, medications, blood draw, and to maintain line patency      __________________ Pertinent Labs__________________   10-10 Na131 mmol/L<L> Glu 194 mg/dL<H> K+ 3.5 mmol/L Cr  1.51 mg/dL<H> BUN 31 mg/dL<H> 10-10 Phos 1.9 mg/dL<L> 10-10 Alb 2.6 g/dL<L> 09-14 Chol 63 mg/dL LDL --    HDL 30 mg/dL<L> Trig 156 mg/dL<H>        POCT Blood Glucose.: 225 mg/dL (10-10-23 @ 11:31)  POCT Blood Glucose.: 245 mg/dL (10-10-23 @ 05:40)  POCT Blood Glucose.: 260 mg/dL (10-10-23 @ 00:14)  POCT Blood Glucose.: 258 mg/dL (10-09-23 @ 17:28)  POCT Blood Glucose.: 253 mg/dL (10-09-23 @ 11:16)  POCT Blood Glucose.: 260 mg/dL (10-09-23 @ 06:19)  POCT Blood Glucose.: 243 mg/dL (10-08-23 @ 23:47)  POCT Blood Glucose.: 286 mg/dL (10-08-23 @ 17:16)  POCT Blood Glucose.: 330 mg/dL (10-08-23 @ 11:40)  POCT Blood Glucose.: 342 mg/dL (10-08-23 @ 06:24)  POCT Blood Glucose.: 300 mg/dL (10-07-23 @ 23:04)  POCT Blood Glucose.: 273 mg/dL (10-07-23 @ 17:36)          Estimated Needs:   [x] no change since previous assessment        Previous Nutrition Diagnosis: severe protein calorie malnutrition    Nutrition Diagnosis is [x ] ongoing       Recommendations:  1) Recommend increase rate to 50ml/hr to provide total 1200ml/day, 1800kcal, 99gm pro, 910.8 ml of free water. Recommend d/c Prosource 1pk daily.   2) Recommend continue monitor and replete electrolytes (Phos/Na).   3) Monitor PO intake, Labs, weights, BMs, and skin integrity.   4) RD to remain available for further nutritional interventions as indicated.       Monitoring and Evaluation:      [ x] Tolerance to diet prescription [x ] weights [x ] follow up per protocol  [ ] other:

## 2023-10-10 NOTE — PROGRESS NOTE ADULT - SUBJECTIVE AND OBJECTIVE BOX
CHIEF COMPLAINT: Patient is a 73y old  Female who presents with a chief complaint of Respiratory distress (09 Oct 2023 20:43)      Interval Events:    REVIEW OF SYSTEMS:  [ ] All other systems negative  [ ] Unable to assess ROS because ________    Mode: AC/ CMV (Assist Control/ Continuous Mandatory Ventilation), RR (machine): 22, TV (machine): 340, FiO2: 35, PEEP: 8, ITime: 0.84, MAP: 20, PIP: 53      OBJECTIVE:  ICU Vital Signs Last 24 Hrs  T(C): 37.6 (10 Oct 2023 06:10), Max: 37.6 (10 Oct 2023 06:10)  T(F): 99.6 (10 Oct 2023 06:10), Max: 99.6 (10 Oct 2023 06:10)  HR: 110 (10 Oct 2023 07:11) (101 - 116)  BP: 120/35 (10 Oct 2023 06:10) (90/39 - 120/35)  BP(mean): 50 (09 Oct 2023 10:55) (50 - 50)  ABP: --  ABP(mean): --  RR: 22 (10 Oct 2023 06:10) (20 - 23)  SpO2: 99% (10 Oct 2023 07:11) (96% - 100%)    O2 Parameters below as of 10 Oct 2023 06:10  Patient On (Oxygen Delivery Method): ventilator    O2 Concentration (%): 40      Mode: AC/ CMV (Assist Control/ Continuous Mandatory Ventilation), RR (machine): 22, TV (machine): 340, FiO2: 35, PEEP: 8, ITime: 0.84, MAP: 20, PIP: 53    10-09 @ 07:01  -  10-10 @ 07:00  --------------------------------------------------------  IN: 880 mL / OUT: 2067 mL / NET: -1187 mL      CAPILLARY BLOOD GLUCOSE      POCT Blood Glucose.: 245 mg/dL (10 Oct 2023 05:40)      HOSPITAL MEDICATIONS:  MEDICATIONS  (STANDING):  albuterol    0.083% 2.5 milliGRAM(s) Nebulizer every 6 hours  artificial tears (preservative free) Ophthalmic Solution 1 Drop(s) Both EYES every 4 hours  aspirin  chewable 81 milliGRAM(s) Enteral Tube daily  atorvastatin 10 milliGRAM(s) Oral at bedtime  chlorhexidine 0.12% Liquid 15 milliLiter(s) Oral Mucosa every 12 hours  chlorhexidine 2% Cloths 1 Application(s) Topical daily  dextrose 5%. 1000 milliLiter(s) (50 mL/Hr) IV Continuous <Continuous>  dextrose 5%. 1000 milliLiter(s) (100 mL/Hr) IV Continuous <Continuous>  dextrose 50% Injectable 12.5 Gram(s) IV Push once  dextrose 50% Injectable 25 Gram(s) IV Push once  dextrose 50% Injectable 25 Gram(s) IV Push once  dextrose 50% Injectable 25 Gram(s) IV Push once  epoetin odalis (EPOGEN) Injectable 65034 Unit(s) IV Push <User Schedule>  ferrous    sulfate Liquid 300 milliGRAM(s) Oral daily  glucagon  Injectable 1 milliGRAM(s) IntraMuscular once  insulin lispro (ADMELOG) corrective regimen sliding scale   SubCutaneous every 6 hours  insulin NPH human recombinant 6 Unit(s) SubCutaneous every 6 hours  levETIRAcetam  Solution 1000 milliGRAM(s) Oral daily  levETIRAcetam  Solution 250 milliGRAM(s) Oral <User Schedule>  midodrine. 30 milliGRAM(s) Oral every 8 hours  pantoprazole  Injectable 40 milliGRAM(s) IV Push two times a day  petrolatum Ophthalmic Ointment 1 Application(s) Both EYES two times a day  polymyxin B IVPB 895942 Unit(s) IV Intermittent every 12 hours  sodium chloride 1 Gram(s) Oral two times a day    MEDICATIONS  (PRN):  acetaminophen   Oral Liquid .. 650 milliGRAM(s) Oral every 6 hours PRN Temp greater or equal to 38C (100.4F), Mild Pain (1 - 3)  calamine/zinc oxide Lotion 1 Application(s) Topical two times a day PRN Rash and/or Itching  dextrose Oral Gel 15 Gram(s) Oral once PRN Blood Glucose LESS THAN 70 milliGRAM(s)/deciliter  sodium chloride 0.9% lock flush 10 milliLiter(s) IV Push every 1 hour PRN Pre/post blood products, medications, blood draw, and to maintain line patency      LABS:                        8.1    13.81 )-----------( 340      ( 09 Oct 2023 06:25 )             25.8     10-09    132<L>  |  95<L>  |  43<H>  ----------------------------<  256<H>  3.9   |  24  |  1.76<H>    Ca    8.5      09 Oct 2023 06:25  Phos  2.9     10-09  Mg     1.80     10-09    TPro  5.6<L>  /  Alb  2.0<L>  /  TBili  0.3  /  DBili  <0.2  /  AST  52<H>  /  ALT  30  /  AlkPhos  330<H>  10-09      Urinalysis Basic - ( 09 Oct 2023 06:25 )    Color: x / Appearance: x / SG: x / pH: x  Gluc: 256 mg/dL / Ketone: x  / Bili: x / Urobili: x   Blood: x / Protein: x / Nitrite: x   Leuk Esterase: x / RBC: x / WBC x   Sq Epi: x / Non Sq Epi: x / Bacteria: x            PAST MEDICAL & SURGICAL HISTORY:  Breast CA      Diabetes      Stroke      Cardiac arrest      HTN (hypertension)      H/O mastectomy, bilateral          FAMILY HISTORY:  No pertinent family history in first degree relatives        Social History:      RADIOLOGY:  [ ] Reviewed and interpreted by me    PULMONARY FUNCTION TESTS:    EKG: CHIEF COMPLAINT: Patient is a 73y old  Female who presents with a chief complaint of Respiratory distress (09 Oct 2023 20:43)      Interval Events: No acute events overnight BP stable     REVIEW OF SYSTEMS:  [x ] Unable to assess ROS because ams    Mode: AC/ CMV (Assist Control/ Continuous Mandatory Ventilation), RR (machine): 22, TV (machine): 340, FiO2: 35, PEEP: 8, ITime: 0.84, MAP: 20, PIP: 53      OBJECTIVE:  ICU Vital Signs Last 24 Hrs  T(C): 37.6 (10 Oct 2023 06:10), Max: 37.6 (10 Oct 2023 06:10)  T(F): 99.6 (10 Oct 2023 06:10), Max: 99.6 (10 Oct 2023 06:10)  HR: 110 (10 Oct 2023 07:11) (101 - 116)  BP: 120/35 (10 Oct 2023 06:10) (90/39 - 120/35)  BP(mean): 50 (09 Oct 2023 10:55) (50 - 50)  ABP: --  ABP(mean): --  RR: 22 (10 Oct 2023 06:10) (20 - 23)  SpO2: 99% (10 Oct 2023 07:11) (96% - 100%)    O2 Parameters below as of 10 Oct 2023 06:10  Patient On (Oxygen Delivery Method): ventilator    O2 Concentration (%): 40      Mode: AC/ CMV (Assist Control/ Continuous Mandatory Ventilation), RR (machine): 22, TV (machine): 340, FiO2: 35, PEEP: 8, ITime: 0.84, MAP: 20, PIP: 53    10-09 @ 07:01  -  10-10 @ 07:00  --------------------------------------------------------  IN: 880 mL / OUT: 2067 mL / NET: -1187 mL      CAPILLARY BLOOD GLUCOSE      POCT Blood Glucose.: 245 mg/dL (10 Oct 2023 05:40)      HOSPITAL MEDICATIONS:  MEDICATIONS  (STANDING):  albuterol    0.083% 2.5 milliGRAM(s) Nebulizer every 6 hours  artificial tears (preservative free) Ophthalmic Solution 1 Drop(s) Both EYES every 4 hours  aspirin  chewable 81 milliGRAM(s) Enteral Tube daily  atorvastatin 10 milliGRAM(s) Oral at bedtime  chlorhexidine 0.12% Liquid 15 milliLiter(s) Oral Mucosa every 12 hours  chlorhexidine 2% Cloths 1 Application(s) Topical daily  dextrose 5%. 1000 milliLiter(s) (50 mL/Hr) IV Continuous <Continuous>  dextrose 5%. 1000 milliLiter(s) (100 mL/Hr) IV Continuous <Continuous>  dextrose 50% Injectable 12.5 Gram(s) IV Push once  dextrose 50% Injectable 25 Gram(s) IV Push once  dextrose 50% Injectable 25 Gram(s) IV Push once  dextrose 50% Injectable 25 Gram(s) IV Push once  epoetin odalis (EPOGEN) Injectable 24356 Unit(s) IV Push <User Schedule>  ferrous    sulfate Liquid 300 milliGRAM(s) Oral daily  glucagon  Injectable 1 milliGRAM(s) IntraMuscular once  insulin lispro (ADMELOG) corrective regimen sliding scale   SubCutaneous every 6 hours  insulin NPH human recombinant 6 Unit(s) SubCutaneous every 6 hours  levETIRAcetam  Solution 1000 milliGRAM(s) Oral daily  levETIRAcetam  Solution 250 milliGRAM(s) Oral <User Schedule>  midodrine. 30 milliGRAM(s) Oral every 8 hours  pantoprazole  Injectable 40 milliGRAM(s) IV Push two times a day  petrolatum Ophthalmic Ointment 1 Application(s) Both EYES two times a day  polymyxin B IVPB 832634 Unit(s) IV Intermittent every 12 hours  sodium chloride 1 Gram(s) Oral two times a day    MEDICATIONS  (PRN):  acetaminophen   Oral Liquid .. 650 milliGRAM(s) Oral every 6 hours PRN Temp greater or equal to 38C (100.4F), Mild Pain (1 - 3)  calamine/zinc oxide Lotion 1 Application(s) Topical two times a day PRN Rash and/or Itching  dextrose Oral Gel 15 Gram(s) Oral once PRN Blood Glucose LESS THAN 70 milliGRAM(s)/deciliter  sodium chloride 0.9% lock flush 10 milliLiter(s) IV Push every 1 hour PRN Pre/post blood products, medications, blood draw, and to maintain line patency      LABS:                        8.1    13.81 )-----------( 340      ( 09 Oct 2023 06:25 )             25.8     10-09    132<L>  |  95<L>  |  43<H>  ----------------------------<  256<H>  3.9   |  24  |  1.76<H>    Ca    8.5      09 Oct 2023 06:25  Phos  2.9     10-09  Mg     1.80     10-09    TPro  5.6<L>  /  Alb  2.0<L>  /  TBili  0.3  /  DBili  <0.2  /  AST  52<H>  /  ALT  30  /  AlkPhos  330<H>  10-09      Urinalysis Basic - ( 09 Oct 2023 06:25 )    Color: x / Appearance: x / SG: x / pH: x  Gluc: 256 mg/dL / Ketone: x  / Bili: x / Urobili: x   Blood: x / Protein: x / Nitrite: x   Leuk Esterase: x / RBC: x / WBC x   Sq Epi: x / Non Sq Epi: x / Bacteria: x            PAST MEDICAL & SURGICAL HISTORY:  Breast CA      Diabetes      Stroke      Cardiac arrest      HTN (hypertension)      H/O mastectomy, bilateral          FAMILY HISTORY:  No pertinent family history in first degree relatives        Social History:      RADIOLOGY:  [ ] Reviewed and interpreted by me    PULMONARY FUNCTION TESTS:    EKG:

## 2023-10-10 NOTE — PROGRESS NOTE ADULT - ASSESSMENT
IMPRESSION: 73F w/ HTN, DM2, CVA, breast CA-bilateral mastectomy, recurrent aspiration pneumonia/respiratory failure, and CKD, 8/11/23 p/w acute hypercapnic respiratory failure; c/b RUSS    (1)Renal - RUSS on CKD4 ==>now newly ESRD. S/p HD yesterday, due tomorrow. Potentially for tunneled cath with IR, timing TBD    (2)Hyponatremia - will improve with HD    (3)Hypokalemia/Hypophosphatemia -  tube feeds changed to Glucerna     (4)CV- tenuous hemodynamics such with each passing week we have more difficulty performing HD/achieving UF. Provided that we continue with aggressive life-sustaining therapy, at some point soon we will need to have her on pressor gtts to allow her to tolerate HD    (5)Pulm- vent-dependent    RECOMMEND:  (1)HD tomorrow - 3hrs, 2kg UF as able, pressors and sodium modelling as needed to keep SBP>90; Epogen with HD  (2)Tunneled cath placement if/when clinically optimized for the procedure  (3)Dose new meds for GFR <10/HD    Nilda Espinoza DNP  [facility]  [phone]          IMPRESSION: 73F w/ HTN, DM2, CVA, breast CA-bilateral mastectomy, recurrent aspiration pneumonia/respiratory failure, and CKD, 8/11/23 p/w acute hypercapnic respiratory failure; c/b RUSS    (1)Renal - RUSS on CKD4 ==>now newly ESRD. S/p HD yesterday, due tomorrow. Potentially for tunneled cath with IR, timing TBD    (2)Hyponatremia - will improve with HD    (3)Hypokalemia/Hypophosphatemia -  tube feeds changed to Glucerna     (4)CV- tenuous hemodynamics such with each passing week we have more difficulty performing HD/achieving UF. Provided that we continue with aggressive life-sustaining therapy, at some point soon we will need to have her on pressor gtts to allow her to tolerate HD    (5)Pulm- vent-dependent    RECOMMEND:  (1)HD tomorrow - 3hrs, 2kg UF as able, pressors and sodium modelling as needed to keep SBP>90; Epogen with HD  (2)Tunneled cath placement if/when clinically optimized for the procedure  (3)Dose new meds for GFR <10/HD    Nilda Espinoza DNP  [facility]  [phone]       RENAL ATTENDING NOTE  Patient seen and examined with NP. Agree with assessment and plan as above.    Jimmy Colon MD  [facility]  [phone]

## 2023-10-10 NOTE — PROGRESS NOTE ADULT - SUBJECTIVE AND OBJECTIVE BOX
Subjective: Patient seen and examined. No new events except as noted.   MICU reconsulted   Hypotensive   REVIEW OF SYSTEMS:  Unable to obtain      MEDICATIONS:  MEDICATIONS  (STANDING):  albuterol    0.083% 2.5 milliGRAM(s) Nebulizer every 6 hours  artificial tears (preservative free) Ophthalmic Solution 1 Drop(s) Both EYES every 4 hours  aspirin  chewable 81 milliGRAM(s) Enteral Tube daily  atorvastatin 10 milliGRAM(s) Oral at bedtime  chlorhexidine 0.12% Liquid 15 milliLiter(s) Oral Mucosa every 12 hours  chlorhexidine 2% Cloths 1 Application(s) Topical daily  dextrose 5%. 1000 milliLiter(s) (50 mL/Hr) IV Continuous <Continuous>  dextrose 5%. 1000 milliLiter(s) (100 mL/Hr) IV Continuous <Continuous>  dextrose 50% Injectable 12.5 Gram(s) IV Push once  dextrose 50% Injectable 25 Gram(s) IV Push once  dextrose 50% Injectable 25 Gram(s) IV Push once  dextrose 50% Injectable 25 Gram(s) IV Push once  epoetin odalis (EPOGEN) Injectable 99773 Unit(s) IV Push <User Schedule>  ferrous    sulfate Liquid 300 milliGRAM(s) Oral daily  glucagon  Injectable 1 milliGRAM(s) IntraMuscular once  insulin lispro (ADMELOG) corrective regimen sliding scale   SubCutaneous every 6 hours  insulin NPH human recombinant 6 Unit(s) SubCutaneous every 6 hours  levETIRAcetam  Solution 1000 milliGRAM(s) Oral daily  levETIRAcetam  Solution 250 milliGRAM(s) Oral <User Schedule>  midodrine. 30 milliGRAM(s) Oral every 8 hours  pantoprazole  Injectable 40 milliGRAM(s) IV Push two times a day  petrolatum Ophthalmic Ointment 1 Application(s) Both EYES two times a day  polymyxin B IVPB 784287 Unit(s) IV Intermittent every 12 hours  sodium chloride 1 Gram(s) Oral two times a day      PHYSICAL EXAM:  T(C): 37.3 (10-10-23 @ 08:00), Max: 37.6 (10-10-23 @ 06:10)  HR: 103 (10-10-23 @ 08:00) (101 - 116)  BP: 110/26 (10-10-23 @ 08:00) (90/39 - 120/35)  RR: 18 (10-10-23 @ 08:00) (18 - 23)  SpO2: 100% (10-10-23 @ 08:00) (96% - 100%)  Wt(kg): --  I&O's Summary    09 Oct 2023 07:01  -  10 Oct 2023 07:00  --------------------------------------------------------  IN: 880 mL / OUT: 2067 mL / NET: -1187 mL            Appearance: NAD, +trach  HEENT: dry oral mucosa  Lymphatic: No lymphadenopathy  Cardiovascular: Normal S1 S2, No JVD, No murmurs, No edema  Respiratory: Decreased BS, + trach to vent	  Neuro: opens eyes  Gastrointestinal: Soft, Non-tender, + BS, + NGT  Skin: No rashes, No ecchymoses, No cyanosis	  Extremities: No strength/ROM 2/2 sedation, + BL LE edema  Vascular: Peripheral pulses palpable 2+ bilaterally      LABS:    CARDIAC MARKERS:                                8.1    13.81 )-----------( 340      ( 09 Oct 2023 06:25 )             25.8     10-09    132<L>  |  95<L>  |  43<H>  ----------------------------<  256<H>  3.9   |  24  |  1.76<H>    Ca    8.5      09 Oct 2023 06:25  Phos  2.9     10-09  Mg     1.80     10-09    TPro  5.6<L>  /  Alb  2.0<L>  /  TBili  0.3  /  DBili  <0.2  /  AST  52<H>  /  ALT  30  /  AlkPhos  330<H>  10-09      TELEMETRY: 	    ECG:  	  RADIOLOGY:   DIAGNOSTIC TESTING:  [ ] Echocardiogram:  [ ]  Catheterization:  [ ] Stress Test:    OTHER:

## 2023-10-10 NOTE — PROGRESS NOTE ADULT - ASSESSMENT
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 family declined renal biopsy started on prednisone  s/p trach 9/1 and BAL with MSSA   ET cx with ESBL and MSSA  blood cx 9/1: MSSA   repeat negative 9/4, 9/8  L ear edema and otitis externa, the last cx showed candida and ENT stated it could be fungal (saw black spores?) so was also given fluconazole  pt had a rash again, unclear what caused it, fluconazole  still with intermittent fevers and then sputum cx with carbapenem resistant pseudomonas    initial resp failure for ?fluid ovrer load but also had MSSA in the sputum, got vanco, aztreonam one day then cefepime and had rash, renal failure which was considered due to cefepime and then s/p trach and bronc on 9/1 with MSSA bacteremia and BAL also MSSA,   hypotension, MSSA bacteremia likely due to pneumonia, repeat blood cx negative 9/4, TTE limited unable to exclude endocarditis, s/p a 4 week course of vanco then dapto through 10/2  L otitis externa since 9/1 but worsening edema and black discoloration with bullae forming and persistent fever (ear cx was negative), Ct showed acute on chronic otitis externa and otomastoiditis , superimposed cholesteatoma can not be excluded, no malignant otitis externa., ENT stated there was black spores which could be a fungal infection and the last cx showed candida albicans s/p 7 days of fluconazole   new erythematous patchy rash, did not improved after discontinuing the ana cristina and vanco, likely due to flucoanzole  also hypotensive now and ?uveitis vs endophthalmitis, head CT with acute/subacute ischemia and old occipital infarct, chest/abd CT with unchanged  RUL consolidation, decreased BEVERLY consolidation  pt uremic as well, s/p shiley and resumed on HD 9/27  s/p bronc with excessive dynamic airway collapse s/p trach change and some improvement  pt febrile and tachy on 9/29, sputum cx with  and proteus and carbapenem resistant pseudomonas (S to zosyn)   ana cristina was switched to zosyn 10/1, again with rash 10/2, fever and increasing WBC eos, so switched to aztreonam 10/3, but repeat sputum cx with pseudomonas also I to aztreonam, amikacin, polymixin and R to recarbrio  pt still with low grade fever and thick secretions   * no better antibiotic coverage in view of resistance and multiple allergies so will continue with polymixin  * will likely complete a 10 day course, aztreonam started 10/3, now day 8  * if fever, repeat blood cx   * ENT stated the otitis external completely resolved  * monitor CBC/diff and renal function      The above assessment and plan was discussed with the primary team    Yumiko Conner MD  contact on teams  After 5pm and on weekends call [phone]

## 2023-10-10 NOTE — PROGRESS NOTE ADULT - ASSESSMENT
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 While in MICU patient noted with worsening renal failure requiring HD initiation, CGE/ Melena s/p EGD 8/31 found with esophagitis and erosive gastropathy, new right cerebellar infarct and fevers second to ESBL COLI and MSSA VAP, MSSA bacteremia, left ear otitis externa and drug rxn to cephalosporins. 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 Case discussed at length renal and given good UOP attempted renal recovery, however failed, and upon reinitiation of HD attempt. R IJ SHILEY failed. Attempted volume removal with Bumex GTT however course complicated by hypotension requiring transfer back to MICU 9/26 for pressor support. Diuresis dc'ed, pressors weaned off and stress steroids started. L IJ SHILEY placed (9/30) and HD reinstated. Course further complicated by AOCD and  PSA and Proteus VAP. Patient transferred back to RCU 10/1 with course complicated by volume overload and grossly resistant organisms.    NEUROLOGY  # NEW cerebella infarct   - Baseline MS AOX3 with short term memory changes per family however noted with concern for poor mentation in ICU  - MRI (8/17) with NEW right cerebellar infarct and old left PCA/occipital infarcts  - STEPHANIE (8/17) with AV showing two linear mobile echogenic elements attached to valve leaflets consistent with Lambel's excrescence, but no TRINITY thrombus, and lambel's excrescence with uncertain clinical implication.   - EEG (8/11-8/15) with no seizures   - ILR interrogation (9/7) with SR and PACs falsely recorded as AF.   - Continue on ASA and lipitor for medical management     # Seizures   - Course complicated by questionable head and body shaking with prolactin elevated 9/8. Discussed for spot EEG however held given low likelihood of seizures noted and acute respiratory illnesses   - Course further complicated with right arm twitching and RPT EEG (10/4) with no seizure however but noted with increase seizure potential in left posterior quadrants.   - RPT EEG (10/5) with possible focal seizures and risk of focal-onset seizures from multiple locations, most prominent in the left posterior temporal/posterocentral region  - RPT EEG (10/6) with RUE twitching are likely non-epileptic in nature, however risk of focal-onset seizures from multiple locations  - Continue on Keppra PPX     # Metabolic vs Uremic Encephalopathy   - Lethargy noted with progression to stupor thought to be second to uremia.   - RPT CTH (9/26) with vague areas of hypoattenuation within the bilateral cerebellar hemispheres which may be compatible with acute/subacute ischemia.   - Discussed CTH with neurology and no signs of new infarct noted.   - HD reinstated in ICU with improvement in uremia however mentation remained   - Poor mentation likely in setting of toxic encephalopathy in setting of infections.     CARDIOVASCULAR  # HTN Urgency   # Septic vs vasoplegic shock   - Labile BPs ranging in between SBP 80s-200s and attempted labetalol, however noted with hypotension and bradycardia likely from BB toxicity vs shock state?    - Holding Labetalol, Catapres and Catapres.    - Attempted volume removal with bumex diuresis however noted with return of shock state requiring pressor support and transfer back to ICU.   - Pressors weaned off to stress dose (last day 10/4) and Midodrine   - Continue on Midodrine 30 TID (9/29 - ) and ensure timed 30-60 minutes prior to HD  - DO NOT HOLD MIDODRINE     # Hx of HFpEF with likely ADHF with volume overload.   - ECHO (7/2023) with EF 59 with severe LVH and diastolic dysfunction   - STEPHANIE (8/18) with normal LVRVSF however noted with increased LA pressures and persistent LA septal bowing into RA.   - ECHO (8/11) with EF 60 with mild LVH, normal LVRVSF, and mild ddfxn.  - Grossly volume overloaded and removing volume with HD midodrine assisted sessions     HEENT   # Left ear OE with acute on chronic left-sided otomastoiditis  - ENT evaluation (9/7) with no mastoid tenderness, however found with positive swelling and excoriation to left auricle and tenderness to manipulation of auricle. Debrided crusting of auricle and EAC evaluated with edema and unable to visualize TM. EAC debrided and found with watery exudate.   - CT IAC with acute on chronic left-sided otitis externa and acute on chronic left-sided otomastoiditis. Superimposed left-sided tympanosclerosis. Superimposed cholesteatoma formation within the left tympanomastoid cavity cannot be excluded  - Completed CiproHC (9/5 - 9/16), Bradford (9/1-10/1) and acetic acid and bacitracin.   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   - Continue on empiric valtrex 500mg BID (9/29 - 10/8) per ophtho and ID  - Continue preservative free artificial tears 4 times a day in the both eye  - Continue lacrilube ointment twice a day in the both eyes     # Oral Lesions with questionable Zoster vs Trauma?   - Patient with tongue pain and seen with anterior ulcerations consolidating and crusting and posterior ulceration.  - Case discussed with pathology resident and culture/ cytology sent.   - HSV negative and Path (9/6) with normal appearing epithelial cells singly and in clusters devoid of viral cytopathic effect.   - Continue on magic mouth wash for pain    RESPIRATORY  # AHHRF second to volume overload   - Hx of CKD and HFpEF presented with SOB and hypercarbia second to volume overload requiring intubation and HD initiation   - Vent weaning attempted however limited requiring tracheostomy (9/1) with IP   - Course complicated by PIPs elevated and found with tracheomalacia and volume overloaded.   - CT CHEST (9/8) with tracheomalacia, BL pleural effusions and continued consolidations   - Continue on vent and given TBM increased PEEP to 8 with improvement in dynamic collapse, but PIP waxes and wanes with volume overloaded and secretions.  - Continue on albuterol and chest PT  - Continue volume removal with HD   - Attempted HTS but noted with hemoptysis and held   - Hold Atrovent given thick secretions   - Hold IPV given high inspiratory pressures      GI  # UGIB   - CGE x 1 episode on 8/24 and melena x 2 episodes on 8/31 likely residual blood.   - EGD (8/31) found with esophagitis and erosive gastropathy  - Continue on PPI BID through late October and then PPI QD     # Dysphagia   - NGT-TF continued   - Pending PEG however needs improvement prior to placement.     RENAL  # Progressive CKD   - Presented volume overload and progressive RUSS on CKD   - POCUS with no signs of hydronephrosis in ICU  - Pressor support started with improvement in renal function in ICU  - Attempted steroid course in ICU without improvement in renal function   - Case discussed at length with renal and initiated on HD in ICU   - Remain on HD while in RCU however noted to be volume overloaded. Attempted Bumex pushes and patient noted with good UOP.   - Attempted renal recovery in RCU however noted with decent UOP, but BUN/ CRE continued to rise without improvement on diuresis.   - Ultimately resumed on HD MWF TIW with midodrine assisted volume removal, however pressure remains soft with difficulty removing aggressive fluids.     # Hyponatremia   - Continue on salt tabs 1G  (decreased 10/6) and weaning off as tolerated with volume removal   - Monitor sodium     # Hyperphosphatemia to Hypophosphatemia with HD  - PTH normal and elevated phos likely from renal failure  - Phos binder with Renvela started with improvement however then noted with hypophosphatemia and Renvela stopped.     INFECTIOUS DISEASE  # ESBL ECOLI and MSSA VAP c/b MSSA bacteremia   - RVP/ COVID (8/11) negative   - SCx (8/11) with MSSA s/p cefepime (8/12-8/13) and then ancef (8/14) however noted with drug reaction and then changed to vanco and aztreonam (8/12-8/13) in ICU .   - Course complicated by recurrent fevers and return of MSSA with bacteremia.   - SCx (9/1) with MSSA and ESBL ECOLI   - BAL (9/1) with MSSA   - BCx (9/1) with MSSA and cleared on (9/4)   - ECHO (9/6) unable to rule out endocarditis   - Continue on Vanco (9/4-9/22) however noted with rashes of uncertain etiology and replaced with Bradford (9/4 - 10/2) and DAPTO (9/26-10/2) for  completion.     # Proteus and  PSA VAP/ Trachitis   - Continued fevers and SCx (9/29) with MDR  PSA and Proteus   - Continued on Bradford as above however noted with resistance and changed to Zosyn (10/2 - 10/5) with course complicated by possible drug rxn. Zosyn changed to Aztreonam (10/5 - 10/6) however RPT SCx (10/3) with  PSA however only intermediate coverage to aztreonam and amikacin.  PSA grossly resistant to other antibiotics other than cephalosporins however patient with reactions in the past. Case discussed with ID and ID pharmacist and change to Polymyxin (10/6 - ) however continues to spike fevers likely second to Polymyxin only intermediate coverage and Recarbio resistant .   - Overall difficulty with ABX tailoring given allergic rxns and resistant organisms.     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  - Monitor HH     VASCULAR   # LLE POP DVT   - US BL LE (9/10) with acute left deep vein thrombosis of the left popliteal vein.  - RUE swollen and VA DOPPLER RUE (10/3) with R IJ DVT and R brachial DVT.  - Eliquis held for permacath however procedure held.   - Eliquis remains on hold second to mild suction trauma hemoptysis.   - Resume Eliquis when able.      # HD access  - L IJ CLAUDIA (9/27 - )   - Planned for IR permacath cancelled for today and will reattempt when infectious status improves.     ONC   # Hx of Breast CA   - Patient dx in 2018 and s/p BL mastectomy (radical on right) and chemo and RT.   - Supportive care.     ENDOCRINE  # IDDM2   - Continued on NPH with ISS, however noted with hypoglycemic episodes and NPH dc'ed.    - Finger sticks trending up off NPH.   - Continue on NPH 6U with moderate ISS.   - Adjust as need     SKIN  # Drug Eruption   - Attempted cefepime (8/12) vs ancef (8/13-8/14) and then noted with drug rxn in ICU. Continue on steroids with prednisone 40 (8/18 - 9/2) then prednisone 30 (9/3-9/7), then prednisone 20 (9/8 - 9/10), then prednisone 10mg (9/11-9/13) then turn off.   - Attempted Zosyn (10/2-10/5) with possible rash. Zosyn turned off with improvement.   - Hold further cephalosporins or PCNs at this time   - Monitor for further drug rxns.     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 While in MICU patient noted with worsening renal failure requiring HD initiation, CGE/ Melena s/p EGD 8/31 found with esophagitis and erosive gastropathy, new right cerebellar infarct and fevers second to ESBL COLI and MSSA VAP, MSSA bacteremia, left ear otitis externa and drug rxn to cephalosporins. 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 Case discussed at length renal and given good UOP attempted renal recovery, however failed, and upon reinitiation of HD attempt. R IJ SHILEY failed. Attempted volume removal with Bumex GTT however course complicated by hypotension requiring transfer back to MICU 9/26 for pressor support. Diuresis dc'ed, pressors weaned off and stress steroids started. L IJ SHILEY placed (9/30) and HD reinstated. Course further complicated by AOCD and  PSA and Proteus VAP. Patient transferred back to RCU 10/1 with course complicated by volume overload and grossly resistant organisms.    NEUROLOGY  # NEW cerebella infarct   - Baseline MS AOX3 with short term memory changes per family however noted with concern for poor mentation in ICU  - MRI (8/17) with NEW right cerebellar infarct and old left PCA/occipital infarcts  - STEPHANIE (8/17) with AV showing two linear mobile echogenic elements attached to valve leaflets consistent with Lambel's excrescence, but no TRINITY thrombus, and lambel's excrescence with uncertain clinical implication.   - EEG (8/11-8/15) with no seizures   - ILR interrogation (9/7) with SR and PACs falsely recorded as AF.   - Continue on ASA and lipitor for medical management     # Seizures   - Course complicated by questionable head and body shaking with prolactin elevated 9/8. Discussed for spot EEG however held given low likelihood of seizures noted and acute respiratory illnesses   - Course further complicated with right arm twitching and RPT EEG (10/4) with no seizure however but noted with increase seizure potential in left posterior quadrants.   - RPT EEG (10/5) with possible focal seizures and risk of focal-onset seizures from multiple locations, most prominent in the left posterior temporal/posterocentral region  - RPT EEG (10/6) with RUE twitching are likely non-epileptic in nature, however risk of focal-onset seizures from multiple locations  - Continue on Keppra PPX     # Metabolic vs Uremic Encephalopathy   - Lethargy noted with progression to stupor thought to be second to uremia.   - RPT CTH (9/26) with vague areas of hypoattenuation within the bilateral cerebellar hemispheres which may be compatible with acute/subacute ischemia.   - Discussed CTH with neurology and no signs of new infarct noted.   - HD reinstated in ICU with improvement in uremia however mentation remained   - Poor mentation likely in setting of toxic encephalopathy in setting of infections.     CARDIOVASCULAR  # HTN Urgency   # Septic vs vasoplegic shock   - Labile BPs ranging in between SBP 80s-200s and attempted labetalol, however noted with hypotension and bradycardia likely from BB toxicity vs shock state?    - Holding Labetalol, Catapres and Catapres.    - Attempted volume removal with bumex diuresis however noted with return of shock state requiring pressor support and transfer back to ICU.   - Pressors weaned off to stress dose (last day 10/4) and Midodrine   - Continue on Midodrine 30 TID (9/29 - ) and ensure timed 30-60 minutes prior to HD  - DO NOT HOLD MIDODRINE   - BP improved s/p albumin     # Hx of HFpEF with likely ADHF with volume overload.   - ECHO (7/2023) with EF 59 with severe LVH and diastolic dysfunction   - STEPHANIE (8/18) with normal LVRVSF however noted with increased LA pressures and persistent LA septal bowing into RA.   - ECHO (8/11) with EF 60 with mild LVH, normal LVRVSF, and mild ddfxn.  - Grossly volume overloaded and removing volume with HD midodrine assisted sessions     HEENT   # Left ear OE with acute on chronic left-sided otomastoiditis  - ENT evaluation (9/7) with no mastoid tenderness, however found with positive swelling and excoriation to left auricle and tenderness to manipulation of auricle. Debrided crusting of auricle and EAC evaluated with edema and unable to visualize TM. EAC debrided and found with watery exudate.   - CT IAC with acute on chronic left-sided otitis externa and acute on chronic left-sided otomastoiditis. Superimposed left-sided tympanosclerosis. Superimposed cholesteatoma formation within the left tympanomastoid cavity cannot be excluded  - Completed CiproHC (9/5 - 9/16), Bradford (9/1-10/1) and acetic acid and bacitracin.   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   - Continue on empiric valtrex 500mg BID (9/29 - 10/8) per ophtho and ID  - Continue preservative free artificial tears 4 times a day in the both eye  - Continue lacrilube ointment twice a day in the both eyes     # Oral Lesions with questionable Zoster vs Trauma?   - Patient with tongue pain and seen with anterior ulcerations consolidating and crusting and posterior ulceration.  - Case discussed with pathology resident and culture/ cytology sent.   - HSV negative and Path (9/6) with normal appearing epithelial cells singly and in clusters devoid of viral cytopathic effect.   - Continue on magic mouth wash for pain    RESPIRATORY  # AHHRF second to volume overload   - Hx of CKD and HFpEF presented with SOB and hypercarbia second to volume overload requiring intubation and HD initiation   - Vent weaning attempted however limited requiring tracheostomy (9/1) with IP   - Course complicated by PIPs elevated and found with tracheomalacia and volume overloaded.   - CT CHEST (9/8) with tracheomalacia, BL pleural effusions and continued consolidations   - Continue on vent and given TBM increased PEEP to 8 with improvement in dynamic collapse, but PIP waxes and wanes with volume overloaded and secretions.  - Continue on albuterol and chest PT  - Continue volume removal with HD   - Attempted HTS but noted with hemoptysis and held   - Hold Atrovent given thick secretions   - Hold IPV given high inspiratory pressures   - Vent changed to PS with improvement - VBG good      GI  # UGIB   - CGE x 1 episode on 8/24 and melena x 2 episodes on 8/31 likely residual blood.   - EGD (8/31) found with esophagitis and erosive gastropathy  - Continue on PPI BID through late October and then PPI QD     # Dysphagia   - NGT-TF continued   - Pending PEG however needs improvement prior to placement.     RENAL  # Progressive CKD   - Presented volume overload and progressive RUSS on CKD   - POCUS with no signs of hydronephrosis in ICU  - Pressor support started with improvement in renal function in ICU  - Attempted steroid course in ICU without improvement in renal function   - Case discussed at length with renal and initiated on HD in ICU   - Remain on HD while in RCU however noted to be volume overloaded. Attempted Bumex pushes and patient noted with good UOP.   - Attempted renal recovery in RCU however noted with decent UOP, but BUN/ CRE continued to rise without improvement on diuresis.   - Ultimately resumed on HD MWF TIW with midodrine assisted volume removal, however pressure remains soft with difficulty removing aggressive fluids.     # Hyponatremia   - Continue on salt tabs 1G  (decreased 10/6) and weaning off as tolerated with volume removal   - Monitor sodium     # Hyperphosphatemia to Hypophosphatemia with HD  - PTH normal and elevated phos likely from renal failure  - Phos binder with Renvela started with improvement however then noted with hypophosphatemia and Renvela stopped.     INFECTIOUS DISEASE  # ESBL ECOLI and MSSA VAP c/b MSSA bacteremia   - RVP/ COVID (8/11) negative   - SCx (8/11) with MSSA s/p cefepime (8/12-8/13) and then ancef (8/14) however noted with drug reaction and then changed to vanco and aztreonam (8/12-8/13) in ICU .   - Course complicated by recurrent fevers and return of MSSA with bacteremia.   - SCx (9/1) with MSSA and ESBL ECOLI   - BAL (9/1) with MSSA   - BCx (9/1) with MSSA and cleared on (9/4)   - ECHO (9/6) unable to rule out endocarditis   - Continue on Vanco (9/4-9/22) however noted with rashes of uncertain etiology and replaced with Bradford (9/4 - 10/2) and DAPTO (9/26-10/2) for  completion.     # Proteus and  PSA VAP/ Trachitis   - Continued fevers and SCx (9/29) with MDR  PSA and Proteus   - Continued on Bradford as above however noted with resistance and changed to Zosyn (10/2 - 10/5) with course complicated by possible drug rxn. Zosyn changed to Aztreonam (10/5 - 10/6) however RPT SCx (10/3) with  PSA however only intermediate coverage to aztreonam and amikacin.  PSA grossly resistant to other antibiotics other than cephalosporins however patient with reactions in the past. Case discussed with ID and ID pharmacist and change to Polymyxin (10/6 - ) however continues to spike fevers likely second to Polymyxin only intermediate coverage and Recarbio resistant .   - Overall difficulty with ABX tailoring given allergic rxns and resistant organisms.     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  - Monitor HH     VASCULAR   # LLE POP DVT   - US BL LE (9/10) with acute left deep vein thrombosis of the left popliteal vein.  - RUE swollen and VA DOPPLER RUE (10/3) with R IJ DVT and R brachial DVT.  - Eliquis held for permacath however procedure held.   - Eliquis remains on hold second to mild suction trauma hemoptysis.   - Resume Eliquis when able.      # HD access  - L ARTHUR TAYLOR (9/27 - )   - Planned for IR permacath cancelled for today and will reattempt when infectious status improves.     ONC   # Hx of Breast CA   - Patient dx in 2018 and s/p BL mastectomy (radical on right) and chemo and RT.   - Supportive care.     ENDOCRINE  # IDDM2   - Continued on NPH with ISS, however noted with hypoglycemic episodes and NPH dc'ed.    - Finger sticks trending up off NPH.   - Continue on NPH 6U with moderate ISS.   - Adjust as need     SKIN  # Drug Eruption   - Attempted cefepime (8/12) vs ancef (8/13-8/14) and then noted with drug rxn in ICU. Continue on steroids with prednisone 40 (8/18 - 9/2) then prednisone 30 (9/3-9/7), then prednisone 20 (9/8 - 9/10), then prednisone 10mg (9/11-9/13) then turn off.   - Attempted Zosyn (10/2-10/5) with possible rash. Zosyn turned off with improvement.   - Hold further cephalosporins or PCNs at this time   - Monitor for further drug rxns.     ETHICS/ GOC    - Attempted palliative discussion however family not interested and wishes for FULL CODE    DISPO - TBD

## 2023-10-11 NOTE — PROGRESS NOTE ADULT - NS ATTEND AMEND GEN_ALL_CORE FT
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 ESRD s/p ICU and intubation. Course further c/b GIB s/p EGD, new R cerebellar infarct, and pneumonia/bacteremia, and prolonged respiratory failure require tracheostomy.       #NEURO  Previous episodes of possible seizure, EEG with possible focal seizure with RUE twitching and at risk of focal onset seizures from multiple locations, especially left posterior temporal/posterocentral region, likely due to encephalomalacia/gliosis from prior left parietal CVA. Also with underlying encephalopathy due to acute illnes + RUSS + sepsis + history of CVA's (MICU course complicated by new R cerebellar, midbrain, and multiple tiny embolic infarcts as well as known left PCA CVA). Repeat CT head on 10/3 with no acute changes. Ammonia normal. BUN improved. Continue vEEG monitoring.  - Started on levetiracetam.   - no evidence of seizure today   - dc asa while on apixaban    #Renal  - Trial of HD today   with fluid removal per renal. Hold HD if hypotensive, midodrine to be given prior   - grossly fluid overloaded on exam   - stable H/H. Anemia of CKD, continue Epoetin per nephrology.     #ID  - Repeat sputum culture with numerous carbapenem-resistant Pseudomonas aeruginosa. However, she is allergic to PCNs and cephalosporins. Appreciate ID follow-up, currently on Polymyxin B. Appreciate ENT follow-up for left ear otitis externa, which has now resolved. Follow-up ophthalmology regarding keratitis + chemosis, on Lacrilube and artificial tears.   - ongoing sepsis with no better antibiotic options given allergies and resistance profile  - appreciate ID input    #CVS  - hypotensive, Continue midodrine 30 mg q8h.  -  Continue aspirin and statin given h/o CVA  - I was informed this evening that patient has been persistently hypotensive since HD completed. ICU consulted for evaluation for pressors/higher level of care   - On apixaban for DVT.      #Pulm   - Continue lung protective ventilation. Very high peak pressures on VC - PIP 65 on ,   RR 22, FiO2 35 - inadequate volumes, repositoined as diaphragmatic compression worsened high PIP - when lowered legs, head of bed improved PIP. Switched back to PC 45/8, with volumes of ~310-320 cc. VBG appreciated  - Airway clearance therapy with albuterol  - Bloody secretions noted upon suction on 10/9- FiO2 requirements unchanged. Will discontinue hypertonic saline, continue albuterol. Monitor closely, if develops antonina hemoptysis start TXA nebs 500 mg q8h standing for 72 hrs  - no further hemoptysis today     #GI  tube feeds as tolerates. PPI for GI ppx.  - check LFTs    #GOALS OF CARE   Goals of care meeting with daughter and , BETTINA Candelario and Nurse Manager present on 10/9/ We discussed Ms. Barraza's current clinical condition detailing management above. All questions from family answered. I informed family that despite all our efforts she is extremely ill and at risk for further deterioration. They understand and wish to pursue all possible medical interventions. She will remain full code.

## 2023-10-11 NOTE — PROGRESS NOTE ADULT - SUBJECTIVE AND OBJECTIVE BOX
NEPHROLOGY     Patient seen and examined, daughter at bedside, trach to vent, in no acute distress.     MEDICATIONS  (STANDING):  albuterol    0.083% 2.5 milliGRAM(s) Nebulizer every 6 hours  apixaban 5 milliGRAM(s) Oral every 12 hours  artificial tears (preservative free) Ophthalmic Solution 1 Drop(s) Both EYES every 4 hours  aspirin  chewable 81 milliGRAM(s) Enteral Tube daily  atorvastatin 10 milliGRAM(s) Oral at bedtime  chlorhexidine 0.12% Liquid 15 milliLiter(s) Oral Mucosa every 12 hours  chlorhexidine 2% Cloths 1 Application(s) Topical daily  dextrose 5%. 1000 milliLiter(s) (50 mL/Hr) IV Continuous <Continuous>  dextrose 5%. 1000 milliLiter(s) (100 mL/Hr) IV Continuous <Continuous>  dextrose 50% Injectable 25 Gram(s) IV Push once  dextrose 50% Injectable 25 Gram(s) IV Push once  dextrose 50% Injectable 12.5 Gram(s) IV Push once  dextrose 50% Injectable 25 Gram(s) IV Push once  epoetin odalis (EPOGEN) Injectable 95457 Unit(s) IV Push <User Schedule>  ferrous    sulfate Liquid 300 milliGRAM(s) Oral daily  glucagon  Injectable 1 milliGRAM(s) IntraMuscular once  insulin lispro (ADMELOG) corrective regimen sliding scale   SubCutaneous every 6 hours  insulin NPH human recombinant 6 Unit(s) SubCutaneous every 6 hours  levETIRAcetam  Solution 1000 milliGRAM(s) Oral daily  levETIRAcetam  Solution 250 milliGRAM(s) Oral <User Schedule>  midodrine. 30 milliGRAM(s) Oral every 8 hours  pantoprazole  Injectable 40 milliGRAM(s) IV Push two times a day  petrolatum Ophthalmic Ointment 1 Application(s) Both EYES two times a day  polymyxin B IVPB 164240 Unit(s) IV Intermittent every 12 hours  potassium phosphate / sodium phosphate Powder (PHOS-NaK) 1 Packet(s) Oral two times a day  sodium chloride 1 Gram(s) Oral two times a day    VITALS:  T(C): , Max: 37.5 (10-10-23 @ 21:40)  T(F): , Max: 99.5 (10-10-23 @ 21:40)  HR: 106 (10-11-23 @ 11:04)  BP: 100/39 (10-11-23 @ 05:31)  RR: 22 (10-11-23 @ 05:31)  SpO2: 99% (10-11-23 @ 11:04)      PHYSICAL EXAM:  Constitutional: minimally communicative at present; on vent  HEENT: trach-vent, (+)NGT  Respiratory: coarse BS b/l  Cardiovascular: tachy reg s1s2  Gastrointestinal: BS+, soft, NT/ND  Extremities: + peripheral edema  Neurological: tone WNL  Skin: No rashes  Access: (+)[facility]    LABS:                        7.5    12.44 )-----------( 295      ( 10 Oct 2023 10:51 )             23.5     10-10    131<L>  |  96<L>  |  31<H>  ----------------------------<  194<H>  3.5   |  25  |  1.51<H>    Ca    8.8      10 Oct 2023 10:51  Phos  1.9     10-10  Mg     1.80     10-10    TPro  5.6<L>  /  Alb  2.6<L>  /  TBili  0.4  /  DBili  <0.2  /  AST  67<H>  /  ALT  40<H>  /  AlkPhos  368<H>  10-10

## 2023-10-11 NOTE — PROGRESS NOTE ADULT - PSYCHIATRIC
[facility]
no agitation

## 2023-10-11 NOTE — PROGRESS NOTE ADULT - ASSESSMENT
74 y/o F well known to me from my [facility] out\[facility] [place] [facility]\"" practice. she was admitted at [facility] 7/12-7/22 w aspiration PNA, was treated w CEFEPIME, developed an allergic rash,  dCHF, + MAC on AFB culture, had been progressively getting more and more lethargic and dyspneic at home since DC.   In  am of 8/11/23  ptn presented with respiratory distress w hypoxia and hypercarbia requiring intubation 2/2 volume overload +/- Asp PNA      Neuro   not responsive  Baseline MS AOx3, aphasic   - h/o CVA , on aspirin and statin . resumed w feeding tube, ASA resumed 8/26  - eeg  2/2 tremors, no sz focus, on KEPPRA  - less responsive in the past few days  - MRI 8/17:  new R cerebellar infarct, old left PCA/Occipital infarct. probably embolic in nature, did not tolerate full AC in the past, STEPHANIE is neg , no shunts observed  - ptn is poorly reactive, rpt scan done, no further CVA seen.     Cardiac   cardiology following  CHFpEF   TTE 7/2023 with EF 59%, with severe LVH and diastolic dysfunction       Pulmonary   Acute hypercapnic and hypoxemic respiratory failure   prolonged intubation, now  trache to  vent, has copious secretions      Renal   on HD via L IJ Shiley, awaiting tunneled catheter when cleared by RCU team      GI  NPO  on tube feeds  coffee ground emesis x 1 8/24, melena overnight 8/31, s/p 1UPRBC, EGD/Colonoscopy: erosive gastropathy, esophagisits, on colonoscopy old blood seen no active bleeding, poor prep  family to decide re PEG placement    Endocrine  on Insulin: NPH, Admelog    ID   complicated infectious hospital course  treated for MSSA bacteremia, aspiration PNA.   finishing course of Abx for P. aeruginosa PNA. presently on Polymyxin B since it was presumed she was allergic to Aztreonam.   ID course complicated with muliple ABx allergies  also treated for Left O. externa along debridement by ENT  left eye treated for presumed HSV w Valtrex    Heme/Onc  on eliquis for  LEFT POPLITEAL VEIN ACUTE DVT     Ethics  GOC - Discussed GOC with daughter and , they have opted in the past for full code. and she remains full code at present

## 2023-10-11 NOTE — PROGRESS NOTE ADULT - SUBJECTIVE AND OBJECTIVE BOX
Subjective: Patient seen and examined. No new events except as noted.     REVIEW OF SYSTEMS:    Unable to obtain    MEDICATIONS:  MEDICATIONS  (STANDING):  albuterol    0.083% 2.5 milliGRAM(s) Nebulizer every 6 hours  artificial tears (preservative free) Ophthalmic Solution 1 Drop(s) Both EYES every 4 hours  aspirin  chewable 81 milliGRAM(s) Enteral Tube daily  atorvastatin 10 milliGRAM(s) Oral at bedtime  chlorhexidine 0.12% Liquid 15 milliLiter(s) Oral Mucosa every 12 hours  chlorhexidine 2% Cloths 1 Application(s) Topical daily  dextrose 5%. 1000 milliLiter(s) (50 mL/Hr) IV Continuous <Continuous>  dextrose 5%. 1000 milliLiter(s) (100 mL/Hr) IV Continuous <Continuous>  dextrose 50% Injectable 12.5 Gram(s) IV Push once  dextrose 50% Injectable 25 Gram(s) IV Push once  dextrose 50% Injectable 25 Gram(s) IV Push once  dextrose 50% Injectable 25 Gram(s) IV Push once  epoetin odalis (EPOGEN) Injectable 34023 Unit(s) IV Push <User Schedule>  ferrous    sulfate Liquid 300 milliGRAM(s) Oral daily  glucagon  Injectable 1 milliGRAM(s) IntraMuscular once  insulin lispro (ADMELOG) corrective regimen sliding scale   SubCutaneous every 6 hours  insulin NPH human recombinant 6 Unit(s) SubCutaneous every 6 hours  levETIRAcetam  Solution 1000 milliGRAM(s) Oral daily  levETIRAcetam  Solution 250 milliGRAM(s) Oral <User Schedule>  midodrine. 30 milliGRAM(s) Oral every 8 hours  pantoprazole  Injectable 40 milliGRAM(s) IV Push two times a day  petrolatum Ophthalmic Ointment 1 Application(s) Both EYES two times a day  polymyxin B IVPB 932126 Unit(s) IV Intermittent every 12 hours  sodium chloride 1 Gram(s) Oral two times a day      PHYSICAL EXAM:  T(C): 37.5 (10-11-23 @ 05:31), Max: 37.5 (10-10-23 @ 21:40)  HR: 107 (10-11-23 @ 08:02) (102 - 116)  BP: 100/39 (10-11-23 @ 05:31) (100/37 - 107/34)  RR: 22 (10-11-23 @ 05:31) (18 - 22)  SpO2: 97% (10-11-23 @ 08:02) (96% - 100%)  Wt(kg): --  I&O's Summary    10 Oct 2023 07:01  -  11 Oct 2023 07:00  --------------------------------------------------------  IN: 960 mL / OUT: 0 mL / NET: 960 mL            Appearance: NAD, +trach  HEENT: dry oral mucosa  Lymphatic: No lymphadenopathy  Cardiovascular: Normal S1 S2, No JVD, No murmurs, No edema  Respiratory: Decreased BS, + trach to vent	  Neuro: opens eyes  Gastrointestinal: Soft, Non-tender, + BS, + NGT  Skin: No rashes, No ecchymoses, No cyanosis	  Extremities: No strength/ROM 2/2 sedation, + BL LE edema  Vascular: Peripheral pulses palpable 2+ bilaterally      Mode: AC/ CMV (Assist Control/ Continuous Mandatory Ventilation), RR (machine): 20, TV (machine): 340, FiO2: 35, PEEP: 8, ITime: 0.8, MAP: 19, PC: 38, PIP: 46  LABS:    CARDIAC MARKERS:                                7.5    12.44 )-----------( 295      ( 10 Oct 2023 10:51 )             23.5     10-10    131<L>  |  96<L>  |  31<H>  ----------------------------<  194<H>  3.5   |  25  |  1.51<H>    Ca    8.8      10 Oct 2023 10:51  Phos  1.9     10-10  Mg     1.80     10-10    TPro  5.6<L>  /  Alb  2.6<L>  /  TBili  0.4  /  DBili  <0.2  /  AST  67<H>  /  ALT  40<H>  /  AlkPhos  368<H>  10-10    proBNP:   Lipid Profile:   HgA1c:   TSH:             TELEMETRY: 	    ECG:  	  RADIOLOGY:   DIAGNOSTIC TESTING:  [ ] Echocardiogram:  [ ]  Catheterization:  [ ] Stress Test:    OTHER:

## 2023-10-11 NOTE — PROGRESS NOTE ADULT - RESPIRATORY
BILAT COARSE BS/breath sounds equal
no respiratory distress
normal/clear to auscultation bilaterally/no wheezes/no rales/no rhonchi
no respiratory distress/no use of accessory muscles/breath sounds equal
normal/clear to auscultation bilaterally/no wheezes/no rales/no rhonchi
bilat coarse bs/breath sounds equal
no wheezes/breath sounds equal
normal/clear to auscultation bilaterally/no wheezes/no rales/no rhonchi
bilat coarse bs/breath sounds equal
bilat coarse bs/breath sounds equal

## 2023-10-11 NOTE — PROGRESS NOTE ADULT - SUBJECTIVE AND OBJECTIVE BOX
Follow Up:  MSSA bacteremia    Interval History: pt afebrile, no acute events    ROS:  unable to obtain because: trached, AMS        Allergies  isoniazid (Rash)  nafcillin (Unknown)  hydrALAZINE (Rash)  vitamin E (Short breath; Urticaria; Hives)  doxycycline (Rash)  cefepime (Rash)  NIFEdipine (Urticaria; Hives)        ANTIMICROBIALS:  polymyxin B IVPB 608547 every 12 hours      OTHER MEDS:  acetaminophen   Oral Liquid .. 650 milliGRAM(s) Oral every 6 hours PRN  albuterol    0.083% 2.5 milliGRAM(s) Nebulizer every 6 hours  apixaban 5 milliGRAM(s) Oral every 12 hours  artificial tears (preservative free) Ophthalmic Solution 1 Drop(s) Both EYES every 4 hours  aspirin  chewable 81 milliGRAM(s) Enteral Tube daily  atorvastatin 10 milliGRAM(s) Oral at bedtime  calamine/zinc oxide Lotion 1 Application(s) Topical two times a day PRN  chlorhexidine 0.12% Liquid 15 milliLiter(s) Oral Mucosa every 12 hours  chlorhexidine 2% Cloths 1 Application(s) Topical daily  dextrose 5%. 1000 milliLiter(s) IV Continuous <Continuous>  dextrose 5%. 1000 milliLiter(s) IV Continuous <Continuous>  dextrose 50% Injectable 12.5 Gram(s) IV Push once  dextrose 50% Injectable 25 Gram(s) IV Push once  dextrose 50% Injectable 25 Gram(s) IV Push once  dextrose 50% Injectable 25 Gram(s) IV Push once  dextrose Oral Gel 15 Gram(s) Oral once PRN  epoetin odalis (EPOGEN) Injectable 31151 Unit(s) IV Push <User Schedule>  ferrous    sulfate Liquid 300 milliGRAM(s) Oral daily  glucagon  Injectable 1 milliGRAM(s) IntraMuscular once  insulin lispro (ADMELOG) corrective regimen sliding scale   SubCutaneous every 6 hours  insulin NPH human recombinant 6 Unit(s) SubCutaneous every 6 hours  levETIRAcetam  Solution 1000 milliGRAM(s) Oral daily  levETIRAcetam  Solution 250 milliGRAM(s) Oral <User Schedule>  midodrine. 30 milliGRAM(s) Oral every 8 hours  pantoprazole  Injectable 40 milliGRAM(s) IV Push two times a day  petrolatum Ophthalmic Ointment 1 Application(s) Both EYES two times a day  potassium phosphate / sodium phosphate Powder (PHOS-NaK) 1 Packet(s) Oral two times a day  sodium chloride 1 Gram(s) Oral two times a day  sodium chloride 0.9% lock flush 10 milliLiter(s) IV Push every 1 hour PRN      Vital Signs Last 24 Hrs  T(C): 37.5 (11 Oct 2023 05:31), Max: 37.5 (10 Oct 2023 21:40)  T(F): 99.5 (11 Oct 2023 05:31), Max: 99.5 (10 Oct 2023 21:40)  HR: 106 (11 Oct 2023 11:04) (105 - 116)  BP: 100/39 (11 Oct 2023 05:31) (100/37 - 107/34)  BP(mean): --  RR: 22 (11 Oct 2023 05:31) (20 - 22)  SpO2: 99% (11 Oct 2023 11:04) (96% - 100%)    Parameters below as of 11 Oct 2023 10:28  Patient On (Oxygen Delivery Method): ventilator        Physical Exam:  General:    trached   Respiratory:   trach on vent  abd:  distended but soft  :  no  willard  Musculoskeletal : generalized edema  Skin: no rash now  vascular: TRISHA odom                                 7.5    12.44 )-----------( 295      ( 10 Oct 2023 10:51 )             23.5       10-10    131<L>  |  96<L>  |  31<H>  ----------------------------<  194<H>  3.5   |  25  |  1.51<H>    Ca    8.8      10 Oct 2023 10:51  Phos  1.9     10-10  Mg     1.80     10-10    TPro  5.6<L>  /  Alb  2.6<L>  /  TBili  0.4  /  DBili  <0.2  /  AST  67<H>  /  ALT  40<H>  /  AlkPhos  368<H>  10-10      Urinalysis Basic - ( 10 Oct 2023 10:51 )    Color: x / Appearance: x / SG: x / pH: x  Gluc: 194 mg/dL / Ketone: x  / Bili: x / Urobili: x   Blood: x / Protein: x / Nitrite: x   Leuk Esterase: x / RBC: x / WBC x   Sq Epi: x / Non Sq Epi: x / Bacteria: x        MICROBIOLOGY:  v  .Blood Blood-Venous  10-09-23   No growth at 48 Hours  --  --      Trach Asp Tracheal Aspirate  10-03-23   Numerous Pseudomonas aeruginosa (Carbapenem Resistant) Susceptibility to  follow.  Normal Respiratory Cate absent  --  Pseudomonas aeruginosa (Carbapenem Resistant)      .Blood Blood-Venous  09-29-23   No growth at 5 days  --  --      .Blood Blood-Peripheral  09-29-23   No growth at 5 days  --  --      ET Tube ET Tube  09-29-23   Rare Yeast  --  --      ET Tube ET Tube  09-29-23   Numerous Proteus mirabilis  Moderate Pseudomonas aeruginosa (Carbapenem Resistant)  Normal Respiratory Cate absent  --  Proteus mirabilis  Pseudomonas aeruginosa (Carbapenem Resistant)      Clean Catch Clean Catch (Midstream)  09-26-23   10,000 - 49,000 CFU/mL Candida albicans "Susceptibilities not performed"  --  --      .Blood Blood-Peripheral  09-26-23   No growth at 5 days  --  --      .Blood Blood-Venous  09-20-23   No growth at 5 days  --  --      .Blood Blood-Peripheral  09-20-23   No growth at 5 days  --  --      Ear Ear  09-13-23   Moderate Candida albicans "Susceptibilities not performed"  --  --                RADIOLOGY:  Images independently visualized and reviewed personally, findings as below  < from: Xray Chest 1 View- PORTABLE-Urgent (Xray Chest 1 View- PORTABLE-Urgent .) (10.08.23 @ 10:41) >    IMPRESSION:  Slightly improved pulmonary edema.    < end of copied text >  < from: CT Head No Cont (10.03.23 @ 20:46) >  IMPRESSION:    No acute intracranial hemorrhage or mass effect. Evaluation of the   posterior fossa degraded by streak artifact from dental amalgam.   Lucencies in the bilateral cerebellum may be artifactual.    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    Bilateral middle ear and mastoid effusions which are also seen on prior   exam.    < end of copied text >  < from: CT Abdomen and Pelvis No Cont (09.26.23 @ 21:02) >  IMPRESSION:  Right upper lobe consolidation, unchanged. Left upper lobe consolidation   and tree-in-bud opacities and bilateral groundglass opacities, decreased.   Persistent interlobular septal thickening.    Bilateral pleural effusions with adjacent compressive atelectasis,   unchanged.    Small volume ascites.    Anasarca.    < end of copied text >

## 2023-10-11 NOTE — PROGRESS NOTE ADULT - SUBJECTIVE AND OBJECTIVE BOX
S: No overnight events. Pt seen and examined. Eliquis restarted    Medications: MEDICATIONS  (STANDING):  albuterol    0.083% 2.5 milliGRAM(s) Nebulizer every 6 hours  apixaban 5 milliGRAM(s) Oral every 12 hours  artificial tears (preservative free) Ophthalmic Solution 1 Drop(s) Both EYES every 4 hours  aspirin  chewable 81 milliGRAM(s) Enteral Tube daily  atorvastatin 10 milliGRAM(s) Oral at bedtime  chlorhexidine 0.12% Liquid 15 milliLiter(s) Oral Mucosa every 12 hours  chlorhexidine 2% Cloths 1 Application(s) Topical daily  dextrose 5%. 1000 milliLiter(s) (50 mL/Hr) IV Continuous <Continuous>  dextrose 5%. 1000 milliLiter(s) (100 mL/Hr) IV Continuous <Continuous>  dextrose 50% Injectable 12.5 Gram(s) IV Push once  dextrose 50% Injectable 25 Gram(s) IV Push once  dextrose 50% Injectable 25 Gram(s) IV Push once  dextrose 50% Injectable 25 Gram(s) IV Push once  epoetin odalis (EPOGEN) Injectable 87616 Unit(s) IV Push <User Schedule>  ferrous    sulfate Liquid 300 milliGRAM(s) Oral daily  glucagon  Injectable 1 milliGRAM(s) IntraMuscular once  insulin lispro (ADMELOG) corrective regimen sliding scale   SubCutaneous every 6 hours  insulin NPH human recombinant 6 Unit(s) SubCutaneous every 6 hours  levETIRAcetam  Solution 1000 milliGRAM(s) Oral daily  levETIRAcetam  Solution 250 milliGRAM(s) Oral <User Schedule>  midodrine. 30 milliGRAM(s) Oral every 8 hours  pantoprazole  Injectable 40 milliGRAM(s) IV Push two times a day  petrolatum Ophthalmic Ointment 1 Application(s) Both EYES two times a day  polymyxin B IVPB 353403 Unit(s) IV Intermittent every 12 hours  sodium chloride 1 Gram(s) Oral two times a day    MEDICATIONS  (PRN):  acetaminophen   Oral Liquid .. 650 milliGRAM(s) Oral every 6 hours PRN Temp greater or equal to 38C (100.4F), Mild Pain (1 - 3)  calamine/zinc oxide Lotion 1 Application(s) Topical two times a day PRN Rash and/or Itching  dextrose Oral Gel 15 Gram(s) Oral once PRN Blood Glucose LESS THAN 70 milliGRAM(s)/deciliter  sodium chloride 0.9% lock flush 10 milliLiter(s) IV Push every 1 hour PRN Pre/post blood products, medications, blood draw, and to maintain line patency       Vitals:  Vital Signs Last 24 Hrs  T(C): 37.3 (11 Oct 2023 18:05), Max: 37.5 (10 Oct 2023 21:40)  T(F): 99.1 (11 Oct 2023 18:05), Max: 99.5 (10 Oct 2023 21:40)  HR: 108 (11 Oct 2023 18:05) (87 - 113)  BP: 122/51 (11 Oct 2023 18:05) (96/50 - 122/51)  BP(mean): --  RR: 20 (11 Oct 2023 18:05) (20 - 22)  SpO2: 100% (11 Oct 2023 15:59) (96% - 100%)    Parameters below as of 11 Oct 2023 18:05  Patient On (Oxygen Delivery Method): ventilator              Neurological Exam:  Mental Status: Eyes closed, off sedation. Does not follow commands,  + trach to vent and NGT   Cranial Nerves: PERRL slightly sluggish, L subconjunctival hemorrhage  Motor: Moves upper extremities spontaneously, tremor R > L  no movement noted in lowers  Sensation: WD to noxious x4    I personally reviewed the below data/images/labs:       LABS:                          7.5    12.44 )-----------( 295      ( 10 Oct 2023 10:51 )             23.5     10-11    129<L>  |  95<L>  |  39<H>  ----------------------------<  196<H>  4.7   |  24  |  1.77<H>    Ca    8.8      11 Oct 2023 14:15  Phos  3.1     10-11  Mg     2.00     10-11    TPro  5.6<L>  /  Alb  2.6<L>  /  TBili  0.4  /  DBili  <0.2  /  AST  67<H>  /  ALT  40<H>  /  AlkPhos  368<H>  10-10    LIVER FUNCTIONS - ( 10 Oct 2023 10:51 )  Alb: 2.6 g/dL / Pro: 5.6 g/dL / ALK PHOS: 368 U/L / ALT: 40 U/L / AST: 67 U/L / GGT: x             Urinalysis Basic - ( 11 Oct 2023 14:15 )    Color: x / Appearance: x / SG: x / pH: x  Gluc: 196 mg/dL / Ketone: x  / Bili: x / Urobili: x   Blood: x / Protein: x / Nitrite: x   Leuk Esterase: x / RBC: x / WBC x   Sq Epi: x / Non Sq Epi: x / Bacteria: x            < from: CT Head No Cont (08.15.23 @ 17:20) >    ACC: [id number] EXAM:  CT BRAIN   ORDERED BY: MINAL SAM     PROCEDURE DATE:  08/15/2023          INTERPRETATION:  Clinical indication: Change in neuro exam.    Multiple axial sections were performed from base to vertex without   contrast enhancement. Coronal and sagittal reconstructions were   reformatted well.    This exam is compared prior head CT performed on August 11, 2023    Parenchymal volume loss and chronic microvessel ischemic changes are   again seen.    Abnormal low-attenuation involving the left occipital cortical   subcortical region is again seen. 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    No evidence of acute hemorrhage mass or mass effect is seen.    Evaluation of the osseous structures with appropriate window demonstrates   sclerotic changes about the left mastoid region which appears stable.   Opacification left middle ear region is again seen.    Patient is status post bilateral cataract surgery.    Impression: Stable exam.    < end of copied text >    vEEG:    Clinical Impression:  - Severe diffuse non-specific cerebral dysfunction  - There were no epileptiform abnormalities or seizures recorded.        CTH 8/11:    VENTRICLES AND SULCI: Age-appropriate involutional change  INTRA-AXIAL:  Old left PCA infarct as seen on the prior unchanged.   Microvascular ischemic changes involving the periventricular and   subcortical white matter as seen previously  EXTRA-AXIAL:  No mass or collection is seen.  VISUALIZED SINUSES:  Clear.  VISUALIZED MASTOIDS: Left mastoid sclerosis  CALVARIUM: Infiltrative appearance tothe calvarium may be indicative of   marrow infiltration on the basis of patient's known diagnosis of breast   cancer. MISCELLANEOUS:  None.    IMPRESSION:  No significant interval change compared with 7/17/2023 in   left PCA infarct which is old. Microvascular ischemic changes involving   the periventricular and subcortical white matter as seen   previously.Questionable lesions at the level of the calvarium related to   possible breast CA. Clinical correlation recommended.    --- End of Report ---      ct< from: CT Head No Cont (09.26.23 @ 21:02) >  IMPRESSION: Vague questionable areas of hypoattenuation within the   bilateral cerebellar hemispheres which may be compatible with   acute/subacute ischemia. Recommend further evaluation with a brain MRI   study, provided there are no MRI contraindications.    No acute intracranial hemorrhage.    Previously seen questionable vague wedge-shaped area of hypoattenuation   in the left parietal lobe with artifactual secondary to motion and volume   averaging with a prominent sulcus.    Chronic left occipital lobe infarct.    < end of copied text >    < from: CT Head No Cont (10.03.23 @ 20:46) >    IMPRESSION:    No acute intracranial hemorrhage or mass effect. Evaluation of the   posterior fossa degraded by streak artifact from dental amalgam.   Lucencies in the bilateral cerebellum may be artifactual.    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    Bilateral middle ear and mastoid effusions which are also seen on prior   exam.    EEG Classification / Summary:  Abnormal EEG in the awake, drowsy states.   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   -No seizures

## 2023-10-11 NOTE — PROGRESS NOTE ADULT - SUBJECTIVE AND OBJECTIVE BOX
Patient is a 73y old  Female who presents with a chief complaint of Respiratory distress (11 Oct 2023 14:43)      SUBJECTIVE / OVERNIGHT EVENTS: remains hypotense, afebrile, HD as per renal, next session today    MEDICATIONS  (STANDING):  albuterol    0.083% 2.5 milliGRAM(s) Nebulizer every 6 hours  apixaban 5 milliGRAM(s) Oral every 12 hours  artificial tears (preservative free) Ophthalmic Solution 1 Drop(s) Both EYES every 4 hours  aspirin  chewable 81 milliGRAM(s) Enteral Tube daily  atorvastatin 10 milliGRAM(s) Oral at bedtime  chlorhexidine 0.12% Liquid 15 milliLiter(s) Oral Mucosa every 12 hours  chlorhexidine 2% Cloths 1 Application(s) Topical daily  dextrose 5%. 1000 milliLiter(s) (50 mL/Hr) IV Continuous <Continuous>  dextrose 5%. 1000 milliLiter(s) (100 mL/Hr) IV Continuous <Continuous>  dextrose 50% Injectable 12.5 Gram(s) IV Push once  dextrose 50% Injectable 25 Gram(s) IV Push once  dextrose 50% Injectable 25 Gram(s) IV Push once  dextrose 50% Injectable 25 Gram(s) IV Push once  epoetin odalis (EPOGEN) Injectable 36778 Unit(s) IV Push <User Schedule>  ferrous    sulfate Liquid 300 milliGRAM(s) Oral daily  glucagon  Injectable 1 milliGRAM(s) IntraMuscular once  insulin lispro (ADMELOG) corrective regimen sliding scale   SubCutaneous every 6 hours  insulin NPH human recombinant 6 Unit(s) SubCutaneous every 6 hours  levETIRAcetam  Solution 1000 milliGRAM(s) Oral daily  levETIRAcetam  Solution 250 milliGRAM(s) Oral <User Schedule>  midodrine. 30 milliGRAM(s) Oral every 8 hours  pantoprazole  Injectable 40 milliGRAM(s) IV Push two times a day  petrolatum Ophthalmic Ointment 1 Application(s) Both EYES two times a day  polymyxin B IVPB 653410 Unit(s) IV Intermittent every 12 hours  potassium phosphate / sodium phosphate Powder (PHOS-NaK) 1 Packet(s) Oral two times a day  sodium chloride 1 Gram(s) Oral two times a day    MEDICATIONS  (PRN):  acetaminophen   Oral Liquid .. 650 milliGRAM(s) Oral every 6 hours PRN Temp greater or equal to 38C (100.4F), Mild Pain (1 - 3)  calamine/zinc oxide Lotion 1 Application(s) Topical two times a day PRN Rash and/or Itching  dextrose Oral Gel 15 Gram(s) Oral once PRN Blood Glucose LESS THAN 70 milliGRAM(s)/deciliter  sodium chloride 0.9% lock flush 10 milliLiter(s) IV Push every 1 hour PRN Pre/post blood products, medications, blood draw, and to maintain line patency      Vital Signs Last 24 Hrs  T(F): 98.2 (10-11-23 @ 12:00), Max: 99.5 (10-10-23 @ 21:40)  HR: 87 (10-11-23 @ 15:59) (87 - 113)  BP: 97/63 (10-11-23 @ 13:40) (96/50 - 118/48)  RR: 22 (10-11-23 @ 12:00) (20 - 22)  SpO2: 100% (10-11-23 @ 15:59) (96% - 100%)  Telemetry:   CAPILLARY BLOOD GLUCOSE      POCT Blood Glucose.: 186 mg/dL (11 Oct 2023 11:15)  POCT Blood Glucose.: 205 mg/dL (11 Oct 2023 05:42)  POCT Blood Glucose.: 231 mg/dL (10 Oct 2023 23:32)  POCT Blood Glucose.: 212 mg/dL (10 Oct 2023 17:26)    I&O's Summary    10 Oct 2023 07:01  -  11 Oct 2023 07:00  --------------------------------------------------------  IN: 960 mL / OUT: 0 mL / NET: 960 mL        PHYSICAL EXAM:  GENERAL: NAD, well-developed  HEAD:  Atraumatic, Normocephalic  EYES: EOMI, PERRLA, conjunctiva and sclera clear  NECK: Supple, No JVD  CHEST/LUNG: Clear to auscultation bilaterally; No wheeze  HEART: Regular rate and rhythm; No murmurs, rubs, or gallops  ABDOMEN: Soft, Nontender, Nondistended; Bowel sounds present  EXTREMITIES:  2+ Peripheral Pulses, No clubbing, cyanosis, or edema  PSYCH: AAOx3  NEUROLOGY: non-focal  SKIN: No rashes or lesions    LABS:                        7.5    12.44 )-----------( 295      ( 10 Oct 2023 10:51 )             23.5     10-11    129<L>  |  95<L>  |  39<H>  ----------------------------<  196<H>  4.7   |  24  |  1.77<H>    Ca    8.8      11 Oct 2023 14:15  Phos  3.1     10-11  Mg     2.00     10-11    TPro  5.6<L>  /  Alb  2.6<L>  /  TBili  0.4  /  DBili  <0.2  /  AST  67<H>  /  ALT  40<H>  /  AlkPhos  368<H>  10-10          Urinalysis Basic - ( 11 Oct 2023 14:15 )    Color: x / Appearance: x / SG: x / pH: x  Gluc: 196 mg/dL / Ketone: x  / Bili: x / Urobili: x   Blood: x / Protein: x / Nitrite: x   Leuk Esterase: x / RBC: x / WBC x   Sq Epi: x / Non Sq Epi: x / Bacteria: x        RADIOLOGY & ADDITIONAL TESTS:    Imaging Personally Reviewed:    Consultant(s) Notes Reviewed:      Care Discussed with Consultants/Other Providers:

## 2023-10-11 NOTE — PROGRESS NOTE ADULT - ASSESSMENT
IMPRESSION: 73F w/ HTN, DM2, CVA, breast CA-bilateral mastectomy, recurrent aspiration pneumonia/respiratory failure, and CKD, 8/11/23 p/w acute hypercapnic respiratory failure; c/b RUSS    (1)Renal - RUSS on CKD4 ==>now newly ESRD. Last dialyzed Monday, due today. Potentially for tunneled cath with IR, timing TBD    (2)Hyponatremia - will improve with HD    (3)Hypokalemia/Hypophosphatemia -  tube feeds changed to Glucerna, supplementing today     (4)CV- tenuous hemodynamics such with each passing week we have more difficulty performing HD/achieving UF. Provided that we continue with aggressive life-sustaining therapy, at some point soon we will need to have her on pressor gtts to allow her to tolerate HD    (5)Pulm- vent-dependent     IMPRESSION: 73F w/ HTN, DM2, CVA, breast CA-bilateral mastectomy, recurrent aspiration pneumonia/respiratory failure, and CKD, 8/11/23 p/w acute hypercapnic respiratory failure; c/b RUSS    (1)Renal - RUSS on CKD4 ==>now newly ESRD. Last dialyzed Monday, due today. Potentially for tunneled cath with IR, timing TBD    (2)Hyponatremia - will improve with HD    (3)Hypokalemia/Hypophosphatemia -  tube feeds changed to Glucerna, supplemented phosnak 2 packets po x 1    (4)CV- tenuous hemodynamics such with each passing week we have more difficulty performing HD/achieving UF. Provided that we continue with aggressive life-sustaining therapy, at some point soon we will need to have her on pressor gtts to allow her to tolerate HD    (5)Pulm- vent-dependent    RECOMMEND:  (1)HD today- 3hrs, 2kg UF as able, pressors and sodium modelling as needed to keep SBP>90; Epogen with HD  (2)A/w Phosnak as ordered  (3)Tunneled cath placement if/when clinically optimized for the procedure  (4)Dose new meds for GFR <10/HD    Nilda Espinoza DNP  [facility]  [phone]   IMPRESSION: 73F w/ HTN, DM2, CVA, breast CA-bilateral mastectomy, recurrent aspiration pneumonia/respiratory failure, and CKD, 8/11/23 p/w acute hypercapnic respiratory failure; c/b RUSS    (1)Renal - RUSS on CKD4 ==>now newly ESRD. Last dialyzed Monday, due today. Potentially for tunneled cath with IR, timing TBD    (2)Hyponatremia - will improve with HD    (3)Hypokalemia/Hypophosphatemia -  tube feeds changed to Glucerna, supplemented phosnak 2 packets po x 1    (4)CV- tenuous hemodynamics such with each passing week we have more difficulty performing HD/achieving UF. Provided that we continue with aggressive life-sustaining therapy, at some point soon we will need to have her on pressor gtts to allow her to tolerate HD    (5)Pulm- vent-dependent    RECOMMEND:  (1)HD today- 3hrs, 2kg UF as able, pressors and sodium modelling as needed to keep SBP>90; Epogen with HD  (2)A/w Phosnak as ordered  (3)Tunneled cath placement if/when clinically optimized for the procedure  (4)Dose new meds for GFR <10/HD    Nilda Espinoza DNP  [facility]  [phone]      RENAL ATTENDING NOTE  Patient seen and examined with NP. Agree with assessment and plan as above.    Jimmy Colon MD  [facility]  [phone]

## 2023-10-11 NOTE — PROGRESS NOTE ADULT - MENTAL STATUS
Opens eyes intermittently to verbal stimuli   Does not follow commands
Unresponsive to verbal /tactile
Eyes open to verbal/tactile stimuli  stimuli - shakes head yes /no
Grimaces to noxious stimuli
Opens eyes racking movement in room
Eyes open /tracking
Eyes open intermittently

## 2023-10-11 NOTE — PROGRESS NOTE ADULT - ASSESSMENT
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 While in MICU patient noted with worsening renal failure requiring HD initiation, CGE/ Melena s/p EGD 8/31 found with esophagitis and erosive gastropathy, new right cerebellar infarct and fevers second to ESBL COLI and MSSA VAP, MSSA bacteremia, left ear otitis externa and drug rxn to cephalosporins. 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 Case discussed at length renal and given good UOP attempted renal recovery, however failed, and upon reinitiation of HD attempt. R IJ SHILEY failed. Attempted volume removal with Bumex GTT however course complicated by hypotension requiring transfer back to MICU 9/26 for pressor support. Diuresis dc'ed, pressors weaned off and stress steroids started. L IJ SHILEY placed (9/30) and HD reinstated. Course further complicated by AOCD and  PSA and Proteus VAP. Patient transferred back to RCU 10/1 with course complicated by volume overload and grossly resistant organisms.    NEUROLOGY  # NEW cerebella infarct   - Baseline MS AOX3 with short term memory changes per family however noted with concern for poor mentation in ICU  - MRI (8/17) with NEW right cerebellar infarct and old left PCA/occipital infarcts  - STEPHANIE (8/17) with AV showing two linear mobile echogenic elements attached to valve leaflets consistent with Lambel's excrescence, but no TRINITY thrombus, and lambel's excrescence with uncertain clinical implication.   - EEG (8/11-8/15) with no seizures   - ILR interrogation (9/7) with SR and PACs falsely recorded as AF.   - Continue on ASA and lipitor for medical management     # Seizures   - Course complicated by questionable head and body shaking with prolactin elevated 9/8. Discussed for spot EEG however held given low likelihood of seizures noted and acute respiratory illnesses   - Course further complicated with right arm twitching and RPT EEG (10/4) with no seizure however but noted with increase seizure potential in left posterior quadrants.   - RPT EEG (10/5) with possible focal seizures and risk of focal-onset seizures from multiple locations, most prominent in the left posterior temporal/posterocentral region  - RPT EEG (10/6) with RUE twitching are likely non-epileptic in nature, however risk of focal-onset seizures from multiple locations  - Continue on Keppra PPX     # Metabolic vs Uremic Encephalopathy   - Lethargy noted with progression to stupor thought to be second to uremia.   - RPT CTH (9/26) with vague areas of hypoattenuation within the bilateral cerebellar hemispheres which may be compatible with acute/subacute ischemia.   - Discussed CTH with neurology and no signs of new infarct noted.   - HD reinstated in ICU with improvement in uremia however mentation remained   - Poor mentation likely in setting of toxic encephalopathy in setting of infections.     CARDIOVASCULAR  # HTN Urgency   # Septic vs vasoplegic shock   - Labile BPs ranging in between SBP 80s-200s and attempted labetalol, however noted with hypotension and bradycardia likely from BB toxicity vs shock state?    - Holding Labetalol, Catapres and Catapres.    - Attempted volume removal with bumex diuresis however noted with return of shock state requiring pressor support and transfer back to ICU.   - Pressors weaned off to stress dose (last day 10/4) and Midodrine   - Continue on Midodrine 30 TID (9/29 - ) and ensure timed 30-60 minutes prior to HD  - DO NOT HOLD MIDODRINE   - BP improved s/p albumin     # Hx of HFpEF with likely ADHF with volume overload.   - ECHO (7/2023) with EF 59 with severe LVH and diastolic dysfunction   - STEPHANIE (8/18) with normal LVRVSF however noted with increased LA pressures and persistent LA septal bowing into RA.   - ECHO (8/11) with EF 60 with mild LVH, normal LVRVSF, and mild ddfxn.  - Grossly volume overloaded and removing volume with HD midodrine assisted sessions     HEENT   # Left ear OE with acute on chronic left-sided otomastoiditis  - ENT evaluation (9/7) with no mastoid tenderness, however found with positive swelling and excoriation to left auricle and tenderness to manipulation of auricle. Debrided crusting of auricle and EAC evaluated with edema and unable to visualize TM. EAC debrided and found with watery exudate.   - CT IAC with acute on chronic left-sided otitis externa and acute on chronic left-sided otomastoiditis. Superimposed left-sided tympanosclerosis. Superimposed cholesteatoma formation within the left tympanomastoid cavity cannot be excluded  - Completed CiproHC (9/5 - 9/16), Bradford (9/1-10/1) and acetic acid and bacitracin.   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   - Continue on empiric valtrex 500mg BID (9/29 - 10/8) per ophtho and ID  - Continue preservative free artificial tears 4 times a day in the both eye  - Continue lacrilube ointment twice a day in the both eyes     # Oral Lesions with questionable Zoster vs Trauma?   - Patient with tongue pain and seen with anterior ulcerations consolidating and crusting and posterior ulceration.  - Case discussed with pathology resident and culture/ cytology sent.   - HSV negative and Path (9/6) with normal appearing epithelial cells singly and in clusters devoid of viral cytopathic effect.   - Continue on magic mouth wash for pain    RESPIRATORY  # AHHRF second to volume overload   - Hx of CKD and HFpEF presented with SOB and hypercarbia second to volume overload requiring intubation and HD initiation   - Vent weaning attempted however limited requiring tracheostomy (9/1) with IP   - Course complicated by PIPs elevated and found with tracheomalacia and volume overloaded.   - CT CHEST (9/8) with tracheomalacia, BL pleural effusions and continued consolidations   - Continue on vent and given TBM increased PEEP to 8 with improvement in dynamic collapse, but PIP waxes and wanes with volume overloaded and secretions.  - Continue on albuterol and chest PT  - Continue volume removal with HD   - Attempted HTS but noted with hemoptysis and held   - Hold Atrovent given thick secretions   - Hold IPV given high inspiratory pressures   - Vent changed to PS with improvement - VBG good      GI  # UGIB   - CGE x 1 episode on 8/24 and melena x 2 episodes on 8/31 likely residual blood.   - EGD (8/31) found with esophagitis and erosive gastropathy  - Continue on PPI BID through late October and then PPI QD     # Dysphagia   - NGT-TF continued   - Pending PEG however needs improvement prior to placement.     RENAL  # Progressive CKD   - Presented volume overload and progressive RUSS on CKD   - POCUS with no signs of hydronephrosis in ICU  - Pressor support started with improvement in renal function in ICU  - Attempted steroid course in ICU without improvement in renal function   - Case discussed at length with renal and initiated on HD in ICU   - Remain on HD while in RCU however noted to be volume overloaded. Attempted Bumex pushes and patient noted with good UOP.   - Attempted renal recovery in RCU however noted with decent UOP, but BUN/ CRE continued to rise without improvement on diuresis.   - Ultimately resumed on HD MWF TIW with midodrine assisted volume removal, however pressure remains soft with difficulty removing aggressive fluids.     # Hyponatremia   - Continue on salt tabs 1G  (decreased 10/6) and weaning off as tolerated with volume removal   - Monitor sodium     # Hyperphosphatemia to Hypophosphatemia with HD  - PTH normal and elevated phos likely from renal failure  - Phos binder with Renvela started with improvement however then noted with hypophosphatemia and Renvela stopped.     INFECTIOUS DISEASE  # ESBL ECOLI and MSSA VAP c/b MSSA bacteremia   - RVP/ COVID (8/11) negative   - SCx (8/11) with MSSA s/p cefepime (8/12-8/13) and then ancef (8/14) however noted with drug reaction and then changed to vanco and aztreonam (8/12-8/13) in ICU .   - Course complicated by recurrent fevers and return of MSSA with bacteremia.   - SCx (9/1) with MSSA and ESBL ECOLI   - BAL (9/1) with MSSA   - BCx (9/1) with MSSA and cleared on (9/4)   - ECHO (9/6) unable to rule out endocarditis   - Continue on Vanco (9/4-9/22) however noted with rashes of uncertain etiology and replaced with Bradford (9/4 - 10/2) and DAPTO (9/26-10/2) for  completion.     # Proteus and  PSA VAP/ Trachitis   - Continued fevers and SCx (9/29) with MDR  PSA and Proteus   - Continued on Bradford as above however noted with resistance and changed to Zosyn (10/2 - 10/5) with course complicated by possible drug rxn. Zosyn changed to Aztreonam (10/5 - 10/6) however RPT SCx (10/3) with  PSA however only intermediate coverage to aztreonam and amikacin.  PSA grossly resistant to other antibiotics other than cephalosporins however patient with reactions in the past. Case discussed with ID and ID pharmacist and change to Polymyxin (10/6 - ) however continues to spike fevers likely second to Polymyxin only intermediate coverage and Recarbio resistant .   - Overall difficulty with ABX tailoring given allergic rxns and resistant organisms.     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  - Monitor HH     VASCULAR   # LLE POP DVT   - US BL LE (9/10) with acute left deep vein thrombosis of the left popliteal vein.  - RUE swollen and VA DOPPLER RUE (10/3) with R IJ DVT and R brachial DVT.  - Eliquis held for permacath however procedure held.   - Eliquis remains on hold second to mild suction trauma hemoptysis.   - Resume Eliquis when able.      # HD access  - L ARTHUR TAYLOR (9/27 - )   - Planned for IR permacath cancelled for today and will reattempt when infectious status improves.     ONC   # Hx of Breast CA   - Patient dx in 2018 and s/p BL mastectomy (radical on right) and chemo and RT.   - Supportive care.     ENDOCRINE  # IDDM2   - Continued on NPH with ISS, however noted with hypoglycemic episodes and NPH dc'ed.    - Finger sticks trending up off NPH.   - Continue on NPH 6U with moderate ISS.   - Adjust as need     SKIN  # Drug Eruption   - Attempted cefepime (8/12) vs ancef (8/13-8/14) and then noted with drug rxn in ICU. Continue on steroids with prednisone 40 (8/18 - 9/2) then prednisone 30 (9/3-9/7), then prednisone 20 (9/8 - 9/10), then prednisone 10mg (9/11-9/13) then turn off.   - Attempted Zosyn (10/2-10/5) with possible rash. Zosyn turned off with improvement.   - Hold further cephalosporins or PCNs at this time   - Monitor for further drug rxns.     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 While in MICU patient noted with worsening renal failure requiring HD initiation, CGE/ Melena s/p EGD 8/31 found with esophagitis and erosive gastropathy, new right cerebellar infarct and fevers second to ESBL COLI and MSSA VAP, MSSA bacteremia, left ear otitis externa and drug rxn to cephalosporins. 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 Case discussed at length renal and given good UOP attempted renal recovery, however failed, and upon reinitiation of HD attempt. R IJ SHILEY failed. Attempted volume removal with Bumex GTT however course complicated by hypotension requiring transfer back to MICU 9/26 for pressor support. Diuresis dc'ed, pressors weaned off and stress steroids started. L IJ SHILEY placed (9/30) and HD reinstated. Course further complicated by AOCD and  PSA and Proteus VAP. Patient transferred back to RCU 10/1 with course complicated by volume overload and grossly resistant organisms.    NEUROLOGY  # NEW cerebella infarct   - Baseline MS AOX3 with short term memory changes per family however noted with concern for poor mentation in ICU  - MRI (8/17) with NEW right cerebellar infarct and old left PCA/occipital infarcts  - STEPHANIE (8/17) with AV showing two linear mobile echogenic elements attached to valve leaflets consistent with Lambel's excrescence, but no TRINITY thrombus, and lambel's excrescence with uncertain clinical implication.   - EEG (8/11-8/15) with no seizures   - ILR interrogation (9/7) with SR and PACs falsely recorded as AF.   - Continue on ASA and lipitor for medical management   - DC ASA per neuro - on ELiquis again     # Seizures   - Course complicated by questionable head and body shaking with prolactin elevated 9/8. Discussed for spot EEG however held given low likelihood of seizures noted and acute respiratory illnesses   - Course further complicated with right arm twitching and RPT EEG (10/4) with no seizure however but noted with increase seizure potential in left posterior quadrants.   - RPT EEG (10/5) with possible focal seizures and risk of focal-onset seizures from multiple locations, most prominent in the left posterior temporal/posterocentral region  - RPT EEG (10/6) with RUE twitching are likely non-epileptic in nature, however risk of focal-onset seizures from multiple locations  - Continue on Keppra PPX     # Metabolic vs Uremic Encephalopathy   - Lethargy noted with progression to stupor thought to be second to uremia.   - RPT CTH (9/26) with vague areas of hypoattenuation within the bilateral cerebellar hemispheres which may be compatible with acute/subacute ischemia.   - Discussed CTH with neurology and no signs of new infarct noted.   - HD reinstated in ICU with improvement in uremia however mentation remained   - Poor mentation likely in setting of toxic encephalopathy in setting of infections.     CARDIOVASCULAR  # HTN Urgency   # Septic vs vasoplegic shock   - Labile BPs ranging in between SBP 80s-200s and attempted labetalol, however noted with hypotension and bradycardia likely from BB toxicity vs shock state?    - Holding Labetalol, Catapres and Catapres.    - Attempted volume removal with bumex diuresis however noted with return of shock state requiring pressor support and transfer back to ICU.   - Pressors weaned off to stress dose (last day 10/4) and Midodrine   - Continue on Midodrine 30 TID (9/29 - ) and ensure timed 30-60 minutes prior to HD  - DO NOT HOLD MIDODRINE   - BP improved s/p albumin     # Hx of HFpEF with likely ADHF with volume overload.   - ECHO (7/2023) with EF 59 with severe LVH and diastolic dysfunction   - STEPHANIE (8/18) with normal LVRVSF however noted with increased LA pressures and persistent LA septal bowing into RA.   - ECHO (8/11) with EF 60 with mild LVH, normal LVRVSF, and mild ddfxn.  - Grossly volume overloaded and removing volume with HD midodrine assisted sessions     HEENT   # Left ear OE with acute on chronic left-sided otomastoiditis  - ENT evaluation (9/7) with no mastoid tenderness, however found with positive swelling and excoriation to left auricle and tenderness to manipulation of auricle. Debrided crusting of auricle and EAC evaluated with edema and unable to visualize TM. EAC debrided and found with watery exudate.   - CT IAC with acute on chronic left-sided otitis externa and acute on chronic left-sided otomastoiditis. Superimposed left-sided tympanosclerosis. Superimposed cholesteatoma formation within the left tympanomastoid cavity cannot be excluded  - Completed CiproHC (9/5 - 9/16), Bradford (9/1-10/1) and acetic acid and bacitracin.   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   - Continue on empiric valtrex 500mg BID (9/29 - 10/8) per ophtho and ID  - Continue preservative free artificial tears 4 times a day in the both eye  - Continue lacrilube ointment twice a day in the both eyes     # Oral Lesions with questionable Zoster vs Trauma?   - Patient with tongue pain and seen with anterior ulcerations consolidating and crusting and posterior ulceration.  - Case discussed with pathology resident and culture/ cytology sent.   - HSV negative and Path (9/6) with normal appearing epithelial cells singly and in clusters devoid of viral cytopathic effect.   - Continue on magic mouth wash for pain    RESPIRATORY  # AHHRF second to volume overload   - Hx of CKD and HFpEF presented with SOB and hypercarbia second to volume overload requiring intubation and HD initiation   - Vent weaning attempted however limited requiring tracheostomy (9/1) with IP   - Course complicated by PIPs elevated and found with tracheomalacia and volume overloaded.   - CT CHEST (9/8) with tracheomalacia, BL pleural effusions and continued consolidations   - Continue on vent and given TBM increased PEEP to 8 with improvement in dynamic collapse, but PIP waxes and wanes with volume overloaded and secretions.  - Continue on albuterol and chest PT  - Continue volume removal with HD   - Attempted HTS but noted with hemoptysis and held   - Hold Atrovent given thick secretions   - Hold IPV given high inspiratory pressures   - Vent changed to PS with improvement - VBG good   - Had inc wob /hypoxic poor volumes today placed back on volume control but pressures increasing pt required positioning to help with volumes - back on pressure control with better volumes /dec pressure   - Rpeat ABG done -ok      GI  # UGIB   - CGE x 1 episode on 8/24 and melena x 2 episodes on 8/31 likely residual blood.   - EGD (8/31) found with esophagitis and erosive gastropathy  - Continue on PPI BID through late October and then PPI QD     # Dysphagia   - NGT-TF continued   - Pending PEG however needs improvement prior to placement.     RENAL  # Progressive CKD   - Presented volume overload and progressive URSS on CKD   - POCUS with no signs of hydronephrosis in ICU  - Pressor support started with improvement in renal function in ICU  - Attempted steroid course in ICU without improvement in renal function   - Case discussed at length with renal and initiated on HD in ICU   - Remain on HD while in RCU however noted to be volume overloaded. Attempted Bumex pushes and patient noted with good UOP.   - Attempted renal recovery in RCU however noted with decent UOP, but BUN/ CRE continued to rise without improvement on diuresis.   - Ultimately resumed on HD MWF TIW with midodrine assisted volume removal, however pressure remains soft with difficulty removing aggressive fluids.     # Hyponatremia   - Continue on salt tabs 1G  (decreased 10/6) and weaning off as tolerated with volume removal   - Monitor sodium     # Hyperphosphatemia to Hypophosphatemia with HD  - PTH normal and elevated phos likely from renal failure  - Phos binder with Renvela started with improvement however then noted with hypophosphatemia and Renvela stopped.   - required replacment KPHOS today with good response - continue to monitor     INFECTIOUS DISEASE  # ESBL ECOLI and MSSA VAP c/b MSSA bacteremia   - RVP/ COVID (8/11) negative   - SCx (8/11) with MSSA s/p cefepime (8/12-8/13) and then ancef (8/14) however noted with drug reaction and then changed to vanco and aztreonam (8/12-8/13) in ICU .   - Course complicated by recurrent fevers and return of MSSA with bacteremia.   - SCx (9/1) with MSSA and ESBL ECOLI   - BAL (9/1) with MSSA   - BCx (9/1) with MSSA and cleared on (9/4)   - ECHO (9/6) unable to rule out endocarditis   - Continue on Vanco (9/4-9/22) however noted with rashes of uncertain etiology and replaced with Bradford (9/4 - 10/2) and DAPTO (9/26-10/2) for  completion.     # Proteus and  PSA VAP/ Trachitis   - Continued fevers and SCx (9/29) with MDR  PSA and Proteus   - Continued on Bradford as above however noted with resistance and changed to Zosyn (10/2 - 10/5) with course complicated by possible drug rxn. Zosyn changed to Aztreonam (10/5 - 10/6) however RPT SCx (10/3) with  PSA however only intermediate coverage to aztreonam and amikacin.  PSA grossly resistant to other antibiotics other than cephalosporins however patient with reactions in the past. Case discussed with ID and ID pharmacist and change to Polymyxin (10/6 - ) however continues to spike fevers likely second to Polymyxin only intermediate coverage and Recarbio resistant .   - Overall difficulty with ABX tailoring given allergic rxns and resistant organisms.   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  - Monitor HH     VASCULAR   # LLE POP DVT   - US BL LE (9/10) with acute left deep vein thrombosis of the left popliteal vein.  - RUE swollen and VA DOPPLER RUE (10/3) with R IJ DVT and R brachial DVT.  - Eliquis held for permacath however procedure held.   - Eliquis remains on hold second to mild suction trauma hemoptysis.   - Resumed Eliquis     # HD access  - L IJ ALEXXRENZO (9/27 - )   - Planned for IR permacath cancelled for today and will reattempt when infectious status improves.     ONC   # Hx of Breast CA   - Patient dx in 2018 and s/p BL mastectomy (radical on right) and chemo and RT.   - Supportive care.     ENDOCRINE  # IDDM2   - Continued on NPH with ISS, however noted with hypoglycemic episodes and NPH dc'ed.    - Finger sticks trending up off NPH.   - Continue on NPH 6U with moderate ISS.   - Adjust as need     SKIN  # Drug Eruption   - Attempted cefepime (8/12) vs ancef (8/13-8/14) and then noted with drug rxn in ICU. Continue on steroids with prednisone 40 (8/18 - 9/2) then prednisone 30 (9/3-9/7), then prednisone 20 (9/8 - 9/10), then prednisone 10mg (9/11-9/13) then turn off.   - Attempted Zosyn (10/2-10/5) with possible rash. Zosyn turned off with improvement.   - Hold further cephalosporins or PCNs at this time   - Monitor for further drug rxns.     ETHICS/ GOC    - Attempted palliative discussion however family not interested and wishes for FULL CODE    DISPO - TBD

## 2023-10-11 NOTE — PROGRESS NOTE ADULT - MUSCULOSKELETAL
decreased strength
no strength
no strength
decreased strength
no strength
decreased strength
decreased strength

## 2023-10-11 NOTE — PROGRESS NOTE ADULT - ASSESSMENT
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 family declined renal biopsy started on prednisone  s/p trach 9/1 and BAL with MSSA   ET cx with ESBL and MSSA  blood cx 9/1: MSSA   repeat negative 9/4, 9/8  L ear edema and otitis externa, the last cx showed candida and ENT stated it could be fungal (saw black spores?) so was also given fluconazole  pt had a rash again, unclear what caused it, fluconazole  still with intermittent fevers and then sputum cx with carbapenem resistant pseudomonas    initial resp failure for ?fluid ovrer load but also had MSSA in the sputum, got vanco, aztreonam one day then cefepime and had rash, renal failure which was considered due to cefepime and then s/p trach and bronc on 9/1 with MSSA bacteremia and BAL also MSSA,   hypotension, MSSA bacteremia likely due to pneumonia, repeat blood cx negative 9/4, TTE limited unable to exclude endocarditis, s/p a 4 week course of vanco then dapto through 10/2  L otitis externa since 9/1 but worsening edema and black discoloration with bullae forming and persistent fever (ear cx was negative), Ct showed acute on chronic otitis externa and otomastoiditis , superimposed cholesteatoma can not be excluded, no malignant otitis externa., ENT stated there was black spores which could be a fungal infection and the last cx showed candida albicans s/p 7 days of fluconazole   new erythematous patchy rash, did not improved after discontinuing the ana cristina and vanco, likely due to flucoanzole  also hypotensive now and ?uveitis vs endophthalmitis, head CT with acute/subacute ischemia and old occipital infarct, chest/abd CT with unchanged  RUL consolidation, decreased BEVERLY consolidation  pt uremic as well, s/p shiley and resumed on HD 9/27  s/p bronc with excessive dynamic airway collapse s/p trach change and some improvement  pt febrile and tachy on 9/29, sputum cx with  and proteus and carbapenem resistant pseudomonas (S to zosyn)   ana cristina was switched to zosyn 10/1, again with rash 10/2, fever and increasing WBC eos, so switched to aztreonam 10/3, but repeat sputum cx with pseudomonas also I to aztreonam, amikacin, polymixin and R to recarbrio  pt still with low grade fever and thick secretions   * no better antibiotic coverage in view of resistance and multiple allergies so will continue with polymixin  * will complete a 10 day course tomorrow, aztreonam started 10/3, now day 9  * if fever, repeat blood cx   * ENT stated the otitis external completely resolved  * will sign off, please call with questions      The above assessment and plan was discussed with the primary team    Yumiko Conner MD  contact on teams  After 5pm and on weekends call [phone]

## 2023-10-11 NOTE — PROGRESS NOTE ADULT - SKIN COMMENTS
Acute pulmonary edema
L ear wound
AAI sheets for skin monitoring by nursing
AAI sheets for skin monitoring by nursing

## 2023-10-11 NOTE — PROGRESS NOTE ADULT - SUBJECTIVE AND OBJECTIVE BOX
CHIEF COMPLAINT: Patient is a 73y old  Female who presents with a chief complaint of Respiratory distress (10 Oct 2023 22:01)      Interval Events:    REVIEW OF SYSTEMS:    [ ] Unable to assess ROS because ________    Mode: AC/ CMV (Assist Control/ Continuous Mandatory Ventilation), RR (machine): 20, TV (machine): 340, FiO2: 35, PEEP: 8, ITime: 0.8, MAP: 19, PC: 38, PIP: 46      OBJECTIVE:  ICU Vital Signs Last 24 Hrs  T(C): 37.5 (11 Oct 2023 05:31), Max: 37.5 (10 Oct 2023 21:40)  T(F): 99.5 (11 Oct 2023 05:31), Max: 99.5 (10 Oct 2023 21:40)  HR: 107 (11 Oct 2023 08:02) (102 - 116)  BP: 100/39 (11 Oct 2023 05:31) (100/37 - 121/39)  BP(mean): --  ABP: --  ABP(mean): --  RR: 22 (11 Oct 2023 05:31) (18 - 22)  SpO2: 97% (11 Oct 2023 08:02) (96% - 100%)    O2 Parameters below as of 11 Oct 2023 05:31  Patient On (Oxygen Delivery Method): ventilator    O2 Concentration (%): 35      Mode: AC/ CMV (Assist Control/ Continuous Mandatory Ventilation), RR (machine): 20, TV (machine): 340, FiO2: 35, PEEP: 8, ITime: 0.8, MAP: 19, PC: 38, PIP: 46    10-10 @ 07:01  -  10-11 @ 07:00  --------------------------------------------------------  IN: 960 mL / OUT: 0 mL / NET: 960 mL      CAPILLARY BLOOD GLUCOSE      POCT Blood Glucose.: 205 mg/dL (11 Oct 2023 05:42)      HOSPITAL MEDICATIONS:  MEDICATIONS  (STANDING):  albuterol    0.083% 2.5 milliGRAM(s) Nebulizer every 6 hours  artificial tears (preservative free) Ophthalmic Solution 1 Drop(s) Both EYES every 4 hours  aspirin  chewable 81 milliGRAM(s) Enteral Tube daily  atorvastatin 10 milliGRAM(s) Oral at bedtime  chlorhexidine 0.12% Liquid 15 milliLiter(s) Oral Mucosa every 12 hours  chlorhexidine 2% Cloths 1 Application(s) Topical daily  dextrose 5%. 1000 milliLiter(s) (50 mL/Hr) IV Continuous <Continuous>  dextrose 5%. 1000 milliLiter(s) (100 mL/Hr) IV Continuous <Continuous>  dextrose 50% Injectable 12.5 Gram(s) IV Push once  dextrose 50% Injectable 25 Gram(s) IV Push once  dextrose 50% Injectable 25 Gram(s) IV Push once  dextrose 50% Injectable 25 Gram(s) IV Push once  epoetin odalis (EPOGEN) Injectable 94629 Unit(s) IV Push <User Schedule>  ferrous    sulfate Liquid 300 milliGRAM(s) Oral daily  glucagon  Injectable 1 milliGRAM(s) IntraMuscular once  insulin lispro (ADMELOG) corrective regimen sliding scale   SubCutaneous every 6 hours  insulin NPH human recombinant 6 Unit(s) SubCutaneous every 6 hours  levETIRAcetam  Solution 1000 milliGRAM(s) Oral daily  levETIRAcetam  Solution 250 milliGRAM(s) Oral <User Schedule>  midodrine. 30 milliGRAM(s) Oral every 8 hours  pantoprazole  Injectable 40 milliGRAM(s) IV Push two times a day  petrolatum Ophthalmic Ointment 1 Application(s) Both EYES two times a day  polymyxin B IVPB 426724 Unit(s) IV Intermittent every 12 hours  sodium chloride 1 Gram(s) Oral two times a day    MEDICATIONS  (PRN):  acetaminophen   Oral Liquid .. 650 milliGRAM(s) Oral every 6 hours PRN Temp greater or equal to 38C (100.4F), Mild Pain (1 - 3)  calamine/zinc oxide Lotion 1 Application(s) Topical two times a day PRN Rash and/or Itching  dextrose Oral Gel 15 Gram(s) Oral once PRN Blood Glucose LESS THAN 70 milliGRAM(s)/deciliter  sodium chloride 0.9% lock flush 10 milliLiter(s) IV Push every 1 hour PRN Pre/post blood products, medications, blood draw, and to maintain line patency      LABS:                        7.5    12.44 )-----------( 295      ( 10 Oct 2023 10:51 )             23.5     10-10    131<L>  |  96<L>  |  31<H>  ----------------------------<  194<H>  3.5   |  25  |  1.51<H>    Ca    8.8      10 Oct 2023 10:51  Phos  1.9     10-10  Mg     1.80     10-10    TPro  5.6<L>  /  Alb  2.6<L>  /  TBili  0.4  /  DBili  <0.2  /  AST  67<H>  /  ALT  40<H>  /  AlkPhos  368<H>  10-10      Urinalysis Basic - ( 10 Oct 2023 10:51 )    Color: x / Appearance: x / SG: x / pH: x  Gluc: 194 mg/dL / Ketone: x  / Bili: x / Urobili: x   Blood: x / Protein: x / Nitrite: x   Leuk Esterase: x / RBC: x / WBC x   Sq Epi: x / Non Sq Epi: x / Bacteria: x        Venous Blood Gas:  10-10 @ 13:48  7.34/53/44/29/83.0  VBG Lactate: 1.2  Venous Blood Gas:  10-10 @ 10:51  7.33/53/42/28/76.3  VBG Lactate: 1.6      PAST MEDICAL & SURGICAL HISTORY:  Breast CA      Diabetes      Stroke      Cardiac arrest      HTN (hypertension)      H/O mastectomy, bilateral          FAMILY HISTORY:  No pertinent family history in first degree relatives        Social History:      RADIOLOGY:  [ ] Reviewed and interpreted by me    PULMONARY FUNCTION TESTS:    EKG: CHIEF COMPLAINT: Patient is a 73y old  Female who presents with a chief complaint of Respiratory distress (10 Oct 2023 22:01)      Interval Events: Seen at bedside wt dtr present  Increased wob during day/pressures     REVIEW OF SYSTEMS:    [x] Unable to assess ROS because ams    Mode: AC/ CMV (Assist Control/ Continuous Mandatory Ventilation), RR (machine): 20, TV (machine): 340, FiO2: 35, PEEP: 8, ITime: 0.8, MAP: 19, PC: 38, PIP: 46      OBJECTIVE:  ICU Vital Signs Last 24 Hrs  T(C): 37.5 (11 Oct 2023 05:31), Max: 37.5 (10 Oct 2023 21:40)  T(F): 99.5 (11 Oct 2023 05:31), Max: 99.5 (10 Oct 2023 21:40)  HR: 107 (11 Oct 2023 08:02) (102 - 116)  BP: 100/39 (11 Oct 2023 05:31) (100/37 - 121/39)  BP(mean): --  ABP: --  ABP(mean): --  RR: 22 (11 Oct 2023 05:31) (18 - 22)  SpO2: 97% (11 Oct 2023 08:02) (96% - 100%)    O2 Parameters below as of 11 Oct 2023 05:31  Patient On (Oxygen Delivery Method): ventilator    O2 Concentration (%): 35      Mode: AC/ CMV (Assist Control/ Continuous Mandatory Ventilation), RR (machine): 20, TV (machine): 340, FiO2: 35, PEEP: 8, ITime: 0.8, MAP: 19, PC: 38, PIP: 46    10-10 @ 07:01  -  10-11 @ 07:00  --------------------------------------------------------  IN: 960 mL / OUT: 0 mL / NET: 960 mL      CAPILLARY BLOOD GLUCOSE      POCT Blood Glucose.: 205 mg/dL (11 Oct 2023 05:42)      HOSPITAL MEDICATIONS:  MEDICATIONS  (STANDING):  albuterol    0.083% 2.5 milliGRAM(s) Nebulizer every 6 hours  artificial tears (preservative free) Ophthalmic Solution 1 Drop(s) Both EYES every 4 hours  aspirin  chewable 81 milliGRAM(s) Enteral Tube daily  atorvastatin 10 milliGRAM(s) Oral at bedtime  chlorhexidine 0.12% Liquid 15 milliLiter(s) Oral Mucosa every 12 hours  chlorhexidine 2% Cloths 1 Application(s) Topical daily  dextrose 5%. 1000 milliLiter(s) (50 mL/Hr) IV Continuous <Continuous>  dextrose 5%. 1000 milliLiter(s) (100 mL/Hr) IV Continuous <Continuous>  dextrose 50% Injectable 12.5 Gram(s) IV Push once  dextrose 50% Injectable 25 Gram(s) IV Push once  dextrose 50% Injectable 25 Gram(s) IV Push once  dextrose 50% Injectable 25 Gram(s) IV Push once  epoetin odalis (EPOGEN) Injectable 05235 Unit(s) IV Push <User Schedule>  ferrous    sulfate Liquid 300 milliGRAM(s) Oral daily  glucagon  Injectable 1 milliGRAM(s) IntraMuscular once  insulin lispro (ADMELOG) corrective regimen sliding scale   SubCutaneous every 6 hours  insulin NPH human recombinant 6 Unit(s) SubCutaneous every 6 hours  levETIRAcetam  Solution 1000 milliGRAM(s) Oral daily  levETIRAcetam  Solution 250 milliGRAM(s) Oral <User Schedule>  midodrine. 30 milliGRAM(s) Oral every 8 hours  pantoprazole  Injectable 40 milliGRAM(s) IV Push two times a day  petrolatum Ophthalmic Ointment 1 Application(s) Both EYES two times a day  polymyxin B IVPB 377903 Unit(s) IV Intermittent every 12 hours  sodium chloride 1 Gram(s) Oral two times a day    MEDICATIONS  (PRN):  acetaminophen   Oral Liquid .. 650 milliGRAM(s) Oral every 6 hours PRN Temp greater or equal to 38C (100.4F), Mild Pain (1 - 3)  calamine/zinc oxide Lotion 1 Application(s) Topical two times a day PRN Rash and/or Itching  dextrose Oral Gel 15 Gram(s) Oral once PRN Blood Glucose LESS THAN 70 milliGRAM(s)/deciliter  sodium chloride 0.9% lock flush 10 milliLiter(s) IV Push every 1 hour PRN Pre/post blood products, medications, blood draw, and to maintain line patency      LABS:                        7.5    12.44 )-----------( 295      ( 10 Oct 2023 10:51 )             23.5     10-10    131<L>  |  96<L>  |  31<H>  ----------------------------<  194<H>  3.5   |  25  |  1.51<H>    Ca    8.8      10 Oct 2023 10:51  Phos  1.9     10-10  Mg     1.80     10-10    TPro  5.6<L>  /  Alb  2.6<L>  /  TBili  0.4  /  DBili  <0.2  /  AST  67<H>  /  ALT  40<H>  /  AlkPhos  368<H>  10-10      Urinalysis Basic - ( 10 Oct 2023 10:51 )    Color: x / Appearance: x / SG: x / pH: x  Gluc: 194 mg/dL / Ketone: x  / Bili: x / Urobili: x   Blood: x / Protein: x / Nitrite: x   Leuk Esterase: x / RBC: x / WBC x   Sq Epi: x / Non Sq Epi: x / Bacteria: x        Venous Blood Gas:  10-10 @ 13:48  7.34/53/44/29/83.0  VBG Lactate: 1.2  Venous Blood Gas:  10-10 @ 10:51  7.33/53/42/28/76.3  VBG Lactate: 1.6      PAST MEDICAL & SURGICAL HISTORY:  Breast CA      Diabetes      Stroke      Cardiac arrest      HTN (hypertension)      H/O mastectomy, bilateral          FAMILY HISTORY:  No pertinent family history in first degree relatives        Social History:      RADIOLOGY:  [ ] Reviewed and interpreted by me    PULMONARY FUNCTION TESTS:    EKG:

## 2023-10-11 NOTE — PROGRESS NOTE ADULT - ASSESSMENT
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 While in MICU patient noted with worsening renal failure requiring HD initiation, CGE/ Melena s/p EGD 8/31 found with esophagitis and erosive gastropathy, new right cerebellar infarct and fevers second to ESBL COLI and MSSA VAP, MSSA bacteremia, left ear otitis externa and drug rxn to cephalosporins Course further complicated by prolonged vent time s/p tracheostomy 9/1 and transferred to RCU 9/3 completed by recurrent fevers with high PIP, respiratory acidosis and concern for volume overloaded.   CTH repeated 9/26 for concern for encephalopathy - has known now - chronic bilateral cerebellar infarcts, L PCA infarct. L parietal hypodensity seen on prior CT likely artifactual  Repeat CTH 10/3 - unchanged  EEG 10/5 with L posterocentral/temporal spikes/sharp waves - doubt true focal seizure upon further review of EEG     Impression:   ? Focal seizure - has hx of bilateral R > L tremors   Metabolic encephalopathy related to shock, infection, doubt new stroke  L PCA stroke, ESUS s/p ILR, also with bilateral centrum semiovale watershed infarcts   Multiple embolic strokes on MRI 8/17 - R cerebellum, midbrain, multiple other tiny embolic infarcts.   Dementia   MSSA bacteremia, r/o endocarditis s/p Daptomycin  Acute popliteal DVT on Eliquis   Anemia     Recommendations   [] has multiple areas of epileptogenic potential on EEG, no clear seizure correlate seen. On Keppra but no improvement in mental status  [] consider MRI brain once stable  [] mgmt of hypotension per piumary team, remains full code  [] Keppra 500mg BID with extra 250mg after HD on HD days.  [] Abx per ID  [] New CTH from 9/26 and 10/3 without any evidence of acute infarct -> encephalopathy likely related to underlying metabolic derangements.  [] GI following for coffee ground emesis - esophagitis seen on colonoscopy, on ELiquis  [] family declined kidney biopsy for AIN -> s/p steroid tx   []  stop aspirin as back on Eliquis  [] C/w home Atorvastatin 10 mg qhs   [] would interrogate ILR and review tele to see if pAF -. no AF seen  [] continue to address GOC with family as pts  and daughter continue to remain hopeful    d/w     Katty Melvin,   Vascular Neurology  Office [phone]

## 2023-10-12 NOTE — PROGRESS NOTE ADULT - SUBJECTIVE AND OBJECTIVE BOX
NEPHROLOGY    Patient seen and examined with spouse at bedside, pt trach to vent, tolerated HD yesterday with 1.5kg removed.     MEDICATIONS  (STANDING):  albuterol    0.083% 2.5 milliGRAM(s) Nebulizer every 6 hours  apixaban 5 milliGRAM(s) Oral every 12 hours  artificial tears (preservative free) Ophthalmic Solution 1 Drop(s) Both EYES every 4 hours  atorvastatin 10 milliGRAM(s) Oral at bedtime  chlorhexidine 0.12% Liquid 15 milliLiter(s) Oral Mucosa every 12 hours  chlorhexidine 2% Cloths 1 Application(s) Topical daily  dextrose 5%. 1000 milliLiter(s) (100 mL/Hr) IV Continuous <Continuous>  dextrose 5%. 1000 milliLiter(s) (50 mL/Hr) IV Continuous <Continuous>  dextrose 50% Injectable 25 Gram(s) IV Push once  dextrose 50% Injectable 25 Gram(s) IV Push once  dextrose 50% Injectable 25 Gram(s) IV Push once  dextrose 50% Injectable 12.5 Gram(s) IV Push once  epoetin odalis (EPOGEN) Injectable 12160 Unit(s) IV Push <User Schedule>  ferrous    sulfate Liquid 300 milliGRAM(s) Oral daily  glucagon  Injectable 1 milliGRAM(s) IntraMuscular once  insulin lispro (ADMELOG) corrective regimen sliding scale   SubCutaneous every 6 hours  insulin NPH human recombinant 6 Unit(s) SubCutaneous every 6 hours  levETIRAcetam  Solution 1000 milliGRAM(s) Oral daily  levETIRAcetam  Solution 250 milliGRAM(s) Oral <User Schedule>  midodrine. 30 milliGRAM(s) Oral every 8 hours  pantoprazole  Injectable 40 milliGRAM(s) IV Push two times a day  petrolatum Ophthalmic Ointment 1 Application(s) Both EYES two times a day  polymyxin B IVPB 194627 Unit(s) IV Intermittent every 12 hours  sodium chloride 1 Gram(s) Oral two times a day    VITALS:  T(C): , Max: 37.8 (10-12-23 @ 08:48)  T(F): , Max: 100 (10-12-23 @ 08:48)  HR: 108 (10-12-23 @ 11:11)  BP: 100/45 (10-12-23 @ 08:48)  BP(mean): 61 (10-12-23 @ 08:48)  RR: 22 (10-12-23 @ 08:48)  SpO2: 98% (10-12-23 @ 11:11)    I and O's:    10-11 @ 07:01  -  10-12 @ 07:00  --------------------------------------------------------  IN: 1080 mL / OUT: 0 mL / NET: 1080 mL    PHYSICAL EXAM:  Constitutional: minimally communicative at present; on vent  HEENT: trach-vent, (+)NGT  Respiratory: coarse BS b/l  Cardiovascular: tachy reg s1s2  Gastrointestinal: BS+, soft, NT/ND  Extremities: + peripheral edema  Neurological: tone WNL  Skin: No rashes  [facility]    LABS:                        7.5    13.54 )-----------( 321      ( 12 Oct 2023 06:29 )             23.8     10-12    131<L>  |  96<L>  |  28<H>  ----------------------------<  179<H>  4.2   |  27  |  1.35<H>    Ca    8.1<L>      12 Oct 2023 06:29  Phos  2.5     10-12  Mg     1.90     10-12

## 2023-10-12 NOTE — PROGRESS NOTE ADULT - ASSESSMENT
IMPRESSION: 73F w/ HTN, DM2, CVA, breast CA-bilateral mastectomy, recurrent aspiration pneumonia/respiratory failure, and CKD, 8/11/23 p/w acute hypercapnic respiratory failure; c/b RUSS    (1)Renal - RUSS on CKD4 ==>now newly ESRD. Last dialyzed Monday, due today. Potentially for tunneled cath with IR, timing TBD    (2)Hyponatremia - improved/stable with HD    (3)Hypokalemia/Hypophosphatemia -  tube feeds changed to Glucerna,  improving s/p phosnak 3 packets yesterday    (4)CV- tenuous hemodynamics such with each passing week we have more difficulty performing HD/achieving UF. Provided that we continue with aggressive life-sustaining therapy, at some point soon we will need to have her on pressor gtts to allow her to tolerate HD    (5)Pulm- vent-dependent    RECOMMEND:  (1)Will attempt PUF today - pressors as needed to keep SBP> 90  (2)Next HD tomorrow - 3hrs, 2kg UF as able, pressors and sodium modelling as needed to keep SBP>90; Epogen with HD  (3)Tunneled cath placement if/when clinically optimized for the procedure  (4)Dose new meds for GFR <10/HD    D/w Dr. Cali, RCU PA, and HD informed     Nilda Espinoza, [facility]  [phone]

## 2023-10-12 NOTE — PROGRESS NOTE ADULT - SUBJECTIVE AND OBJECTIVE BOX
CHIEF COMPLAINT:Patient is a 73y old  Female who presents with a chief complaint of Respiratory distress (11 Oct 2023 19:35)      INTERVAL EVENTS:     ROS: Seen by bedside during AM rounds     OBJECTIVE:  ICU Vital Signs Last 24 Hrs  T(C): 36.9 (12 Oct 2023 04:00), Max: 37.6 (12 Oct 2023 00:00)  T(F): 98.5 (12 Oct 2023 04:00), Max: 99.6 (12 Oct 2023 00:00)  HR: 106 (12 Oct 2023 04:00) (87 - 123)  BP: 116/51 (12 Oct 2023 04:00) (96/50 - 129/53)  BP(mean): 71 (12 Oct 2023 04:00) (71 - 71)  ABP: --  ABP(mean): --  RR: 22 (12 Oct 2023 04:00) (20 - 22)  SpO2: 100% (12 Oct 2023 04:00) (95% - 100%)    O2 Parameters below as of 12 Oct 2023 04:00  Patient On (Oxygen Delivery Method): ventilator    O2 Concentration (%): 35      Mode: AC/ CMV (Assist Control/ Continuous Mandatory Ventilation), RR (machine): 22, FiO2: 35, PEEP: 8, ITime: 0.8, MAP: 21, PC: 45, PIP: 53    10-11 @ 07:01  -  10-12 @ 07:00  --------------------------------------------------------  IN: 1080 mL / OUT: 0 mL / NET: 1080 mL      CAPILLARY BLOOD GLUCOSE      POCT Blood Glucose.: 185 mg/dL (12 Oct 2023 05:22)      PHYSICAL EXAM:  General:   HEENT:   Lymph Nodes:  Neck:   Respiratory:   Cardiovascular:   Abdomen:   Extremities:   Skin:   Neurological:  Psychiatry:    Mode: AC/ CMV (Assist Control/ Continuous Mandatory Ventilation)  RR (machine): 22  FiO2: 35  PEEP: 8  ITime: 0.8  MAP: 21  PC: 45  PIP: 53      HOSPITAL MEDICATIONS:  MEDICATIONS  (STANDING):  albuterol    0.083% 2.5 milliGRAM(s) Nebulizer every 6 hours  apixaban 5 milliGRAM(s) Oral every 12 hours  artificial tears (preservative free) Ophthalmic Solution 1 Drop(s) Both EYES every 4 hours  atorvastatin 10 milliGRAM(s) Oral at bedtime  chlorhexidine 0.12% Liquid 15 milliLiter(s) Oral Mucosa every 12 hours  chlorhexidine 2% Cloths 1 Application(s) Topical daily  dextrose 5%. 1000 milliLiter(s) (50 mL/Hr) IV Continuous <Continuous>  dextrose 5%. 1000 milliLiter(s) (100 mL/Hr) IV Continuous <Continuous>  dextrose 50% Injectable 12.5 Gram(s) IV Push once  dextrose 50% Injectable 25 Gram(s) IV Push once  dextrose 50% Injectable 25 Gram(s) IV Push once  dextrose 50% Injectable 25 Gram(s) IV Push once  epoetin odalis (EPOGEN) Injectable 04138 Unit(s) IV Push <User Schedule>  ferrous    sulfate Liquid 300 milliGRAM(s) Oral daily  glucagon  Injectable 1 milliGRAM(s) IntraMuscular once  insulin lispro (ADMELOG) corrective regimen sliding scale   SubCutaneous every 6 hours  insulin NPH human recombinant 6 Unit(s) SubCutaneous every 6 hours  levETIRAcetam  Solution 1000 milliGRAM(s) Oral daily  levETIRAcetam  Solution 250 milliGRAM(s) Oral <User Schedule>  midodrine. 30 milliGRAM(s) Oral every 8 hours  pantoprazole  Injectable 40 milliGRAM(s) IV Push two times a day  petrolatum Ophthalmic Ointment 1 Application(s) Both EYES two times a day  polymyxin B IVPB 628497 Unit(s) IV Intermittent every 12 hours  sodium chloride 1 Gram(s) Oral two times a day    MEDICATIONS  (PRN):  acetaminophen   Oral Liquid .. 650 milliGRAM(s) Oral every 6 hours PRN Temp greater or equal to 38C (100.4F), Mild Pain (1 - 3)  calamine/zinc oxide Lotion 1 Application(s) Topical two times a day PRN Rash and/or Itching  dextrose Oral Gel 15 Gram(s) Oral once PRN Blood Glucose LESS THAN 70 milliGRAM(s)/deciliter  sodium chloride 0.9% lock flush 10 milliLiter(s) IV Push every 1 hour PRN Pre/post blood products, medications, blood draw, and to maintain line patency      LABS:                        7.5    13.54 )-----------( 321      ( 12 Oct 2023 06:29 )             23.8     10-12    131<L>  |  96<L>  |  28<H>  ----------------------------<  179<H>  4.2   |  27  |  1.35<H>    Ca    8.1<L>      12 Oct 2023 06:29  Phos  2.5     10-12  Mg     1.90     10-12    TPro  5.6<L>  /  Alb  2.6<L>  /  TBili  0.4  /  DBili  <0.2  /  AST  67<H>  /  ALT  40<H>  /  AlkPhos  368<H>  10-10      Urinalysis Basic - ( 12 Oct 2023 06:29 )    Color: x / Appearance: x / SG: x / pH: x  Gluc: 179 mg/dL / Ketone: x  / Bili: x / Urobili: x   Blood: x / Protein: x / Nitrite: x   Leuk Esterase: x / RBC: x / WBC x   Sq Epi: x / Non Sq Epi: x / Bacteria: x      Arterial Blood Gas:  10-11 @ 14:15  7.33/50/127/26/99.3/-0.2  ABG lactate: --    Venous Blood Gas:  10-12 @ 06:30  7.37/50/70/29/97.1  VBG Lactate: --  Venous Blood Gas:  10-10 @ 13:48  7.34/53/44/29/83.0  VBG Lactate: 1.2  Venous Blood Gas:  10-10 @ 10:51  7.33/53/42/28/76.3  VBG Lactate: 1.6   CHIEF COMPLAINT:Patient is a 73y old  Female who presents with a chief complaint of Respiratory distress (11 Oct 2023 19:35)      INTERVAL EVENTS: no overnight events noted. D/w Renal to do additional UF session as able. Ophtho reconsulted given L-eye injection.     ROS: Unable to obtain ROS given patient is nonverbal in s/o poor mentation.     OBJECTIVE:  ICU Vital Signs Last 24 Hrs  T(C): 36.9 (12 Oct 2023 04:00), Max: 37.6 (12 Oct 2023 00:00)  T(F): 98.5 (12 Oct 2023 04:00), Max: 99.6 (12 Oct 2023 00:00)  HR: 106 (12 Oct 2023 04:00) (87 - 123)  BP: 116/51 (12 Oct 2023 04:00) (96/50 - 129/53)  BP(mean): 71 (12 Oct 2023 04:00) (71 - 71)  ABP: --  ABP(mean): --  RR: 22 (12 Oct 2023 04:00) (20 - 22)  SpO2: 100% (12 Oct 2023 04:00) (95% - 100%)    O2 Parameters below as of 12 Oct 2023 04:00  Patient On (Oxygen Delivery Method): ventilator    O2 Concentration (%): 35      Mode: AC/ CMV (Assist Control/ Continuous Mandatory Ventilation), RR (machine): 22, FiO2: 35, PEEP: 8, ITime: 0.8, MAP: 21, PC: 45, PIP: 53    10-11 @ 07:01  -  10-12 @ 07:00  --------------------------------------------------------  IN: 1080 mL / OUT: 0 mL / NET: 1080 mL      CAPILLARY BLOOD GLUCOSE      POCT Blood Glucose.: 185 mg/dL (12 Oct 2023 05:22)      PHYSICAL EXAM:  General: NAD   HEENT: (+)NGT in nare  Neck: (+) Trach tube noted, site c/d/i. (+)LIZJ Ashlee cath  Cards: S1/S2, no murmurs   Pulm: Mild b/l wheezes.   Abdomen: Soft, NTND. (+)Lower abd pitting edema   Extremities: (+)1-2 b/l LE edema.   Neurology: lethargic. Nonverbal. Not following commands. Minimal arousal with verbal or tactile stimuli   Skin: warm to touch, color appropriate for ethnicity. Refer to RN assessment for further details.    Mode: AC/ CMV (Assist Control/ Continuous Mandatory Ventilation)  RR (machine): 22  FiO2: 35  PEEP: 8  ITime: 0.8  MAP: 21  PC: 45  PIP: 53      HOSPITAL MEDICATIONS:  MEDICATIONS  (STANDING):  albuterol    0.083% 2.5 milliGRAM(s) Nebulizer every 6 hours  apixaban 5 milliGRAM(s) Oral every 12 hours  artificial tears (preservative free) Ophthalmic Solution 1 Drop(s) Both EYES every 4 hours  atorvastatin 10 milliGRAM(s) Oral at bedtime  chlorhexidine 0.12% Liquid 15 milliLiter(s) Oral Mucosa every 12 hours  chlorhexidine 2% Cloths 1 Application(s) Topical daily  dextrose 5%. 1000 milliLiter(s) (50 mL/Hr) IV Continuous <Continuous>  dextrose 5%. 1000 milliLiter(s) (100 mL/Hr) IV Continuous <Continuous>  dextrose 50% Injectable 12.5 Gram(s) IV Push once  dextrose 50% Injectable 25 Gram(s) IV Push once  dextrose 50% Injectable 25 Gram(s) IV Push once  dextrose 50% Injectable 25 Gram(s) IV Push once  epoetin odalis (EPOGEN) Injectable 33228 Unit(s) IV Push <User Schedule>  ferrous    sulfate Liquid 300 milliGRAM(s) Oral daily  glucagon  Injectable 1 milliGRAM(s) IntraMuscular once  insulin lispro (ADMELOG) corrective regimen sliding scale   SubCutaneous every 6 hours  insulin NPH human recombinant 6 Unit(s) SubCutaneous every 6 hours  levETIRAcetam  Solution 1000 milliGRAM(s) Oral daily  levETIRAcetam  Solution 250 milliGRAM(s) Oral <User Schedule>  midodrine. 30 milliGRAM(s) Oral every 8 hours  pantoprazole  Injectable 40 milliGRAM(s) IV Push two times a day  petrolatum Ophthalmic Ointment 1 Application(s) Both EYES two times a day  polymyxin B IVPB 379451 Unit(s) IV Intermittent every 12 hours  sodium chloride 1 Gram(s) Oral two times a day    MEDICATIONS  (PRN):  acetaminophen   Oral Liquid .. 650 milliGRAM(s) Oral every 6 hours PRN Temp greater or equal to 38C (100.4F), Mild Pain (1 - 3)  calamine/zinc oxide Lotion 1 Application(s) Topical two times a day PRN Rash and/or Itching  dextrose Oral Gel 15 Gram(s) Oral once PRN Blood Glucose LESS THAN 70 milliGRAM(s)/deciliter  sodium chloride 0.9% lock flush 10 milliLiter(s) IV Push every 1 hour PRN Pre/post blood products, medications, blood draw, and to maintain line patency      LABS:                        7.5    13.54 )-----------( 321      ( 12 Oct 2023 06:29 )             23.8     10-12    131<L>  |  96<L>  |  28<H>  ----------------------------<  179<H>  4.2   |  27  |  1.35<H>    Ca    8.1<L>      12 Oct 2023 06:29  Phos  2.5     10-12  Mg     1.90     10-12    TPro  5.6<L>  /  Alb  2.6<L>  /  TBili  0.4  /  DBili  <0.2  /  AST  67<H>  /  ALT  40<H>  /  AlkPhos  368<H>  10-10      Urinalysis Basic - ( 12 Oct 2023 06:29 )    Color: x / Appearance: x / SG: x / pH: x  Gluc: 179 mg/dL / Ketone: x  / Bili: x / Urobili: x   Blood: x / Protein: x / Nitrite: x   Leuk Esterase: x / RBC: x / WBC x   Sq Epi: x / Non Sq Epi: x / Bacteria: x      Arterial Blood Gas:  10-11 @ 14:15  7.33/50/127/26/99.3/-0.2  ABG lactate: --    Venous Blood Gas:  10-12 @ 06:30  7.37/50/70/29/97.1  VBG Lactate: --  Venous Blood Gas:  10-10 @ 13:48  7.34/53/44/29/83.0  VBG Lactate: 1.2  Venous Blood Gas:  10-10 @ 10:51  7.33/53/42/28/76.3  VBG Lactate: 1.6

## 2023-10-12 NOTE — PROGRESS NOTE ADULT - SUBJECTIVE AND OBJECTIVE BOX
Subjective: Patient seen and examined. No new events except as noted.     REVIEW OF SYSTEMS:    CONSTITUTIONAL: + weakness, fevers or chills  EYES/ENT: No visual changes;  No vertigo or throat pain   NECK: No pain or stiffness  RESPIRATORY: No cough, wheezing, hemoptysis; No shortness of breath  CARDIOVASCULAR: No chest pain or palpitations  GASTROINTESTINAL: No abdominal or epigastric pain. No nausea, vomiting, or hematemesis; No diarrhea or constipation. No melena or hematochezia.  GENITOURINARY: No dysuria, frequency or hematuria  NEUROLOGICAL: No numbness or weakness  SKIN: No itching, burning, rashes, or lesions   All other review of systems is negative unless indicated above.    MEDICATIONS:  MEDICATIONS  (STANDING):  albuterol    0.083% 2.5 milliGRAM(s) Nebulizer every 6 hours  apixaban 5 milliGRAM(s) Oral every 12 hours  artificial tears (preservative free) Ophthalmic Solution 1 Drop(s) Both EYES every 4 hours  atorvastatin 10 milliGRAM(s) Oral at bedtime  chlorhexidine 0.12% Liquid 15 milliLiter(s) Oral Mucosa every 12 hours  chlorhexidine 2% Cloths 1 Application(s) Topical daily  dextrose 5%. 1000 milliLiter(s) (50 mL/Hr) IV Continuous <Continuous>  dextrose 5%. 1000 milliLiter(s) (100 mL/Hr) IV Continuous <Continuous>  dextrose 50% Injectable 12.5 Gram(s) IV Push once  dextrose 50% Injectable 25 Gram(s) IV Push once  dextrose 50% Injectable 25 Gram(s) IV Push once  dextrose 50% Injectable 25 Gram(s) IV Push once  epoetin odalis (EPOGEN) Injectable 02772 Unit(s) IV Push <User Schedule>  ferrous    sulfate Liquid 300 milliGRAM(s) Oral daily  glucagon  Injectable 1 milliGRAM(s) IntraMuscular once  insulin lispro (ADMELOG) corrective regimen sliding scale   SubCutaneous every 6 hours  insulin NPH human recombinant 6 Unit(s) SubCutaneous every 6 hours  levETIRAcetam  Solution 1000 milliGRAM(s) Oral daily  levETIRAcetam  Solution 250 milliGRAM(s) Oral <User Schedule>  midodrine. 30 milliGRAM(s) Oral every 8 hours  pantoprazole  Injectable 40 milliGRAM(s) IV Push two times a day  petrolatum Ophthalmic Ointment 1 Application(s) Both EYES two times a day  polymyxin B IVPB 197438 Unit(s) IV Intermittent every 12 hours  sodium chloride 1 Gram(s) Oral two times a day      PHYSICAL EXAM:  T(C): 37.8 (10-12-23 @ 08:48), Max: 37.8 (10-12-23 @ 08:48)  HR: 106 (10-12-23 @ 08:48) (87 - 123)  BP: 100/45 (10-12-23 @ 08:48) (96/50 - 129/53)  RR: 22 (10-12-23 @ 08:48) (20 - 22)  SpO2: 100% (10-12-23 @ 08:48) (95% - 100%)  Wt(kg): --  I&O's Summary    11 Oct 2023 07:01  -  12 Oct 2023 07:00  --------------------------------------------------------  IN: 1080 mL / OUT: 0 mL / NET: 1080 mL            Appearance: NAD, +trach  HEENT: dry oral mucosa  Lymphatic: No lymphadenopathy  Cardiovascular: Normal S1 S2, No JVD, No murmurs, No edema  Respiratory: Decreased BS, + trach to vent	  Neuro: opens eyes  Gastrointestinal: Soft, Non-tender, + BS, + NGT  Skin: No rashes, No ecchymoses, No cyanosis	  Extremities: No strength/ROM 2/2 sedation, + BL LE edema  Vascular: Peripheral pulses palpable 2+ bilaterally      Mode: AC/ CMV (Assist Control/ Continuous Mandatory Ventilation), RR (machine): 20, TV (machine): 340, FiO2: 35, PEEP: 8, ITime: 0.8, MAP: 19, PC: 38, PIP: 46      LABS:    CARDIAC MARKERS:                                7.5    13.54 )-----------( 321      ( 12 Oct 2023 06:29 )             23.8     10-12    131<L>  |  96<L>  |  28<H>  ----------------------------<  179<H>  4.2   |  27  |  1.35<H>    Ca    8.1<L>      12 Oct 2023 06:29  Phos  2.5     10-12  Mg     1.90     10-12      proBNP:   Lipid Profile:   HgA1c:   TSH:             TELEMETRY: 	    ECG:  	  RADIOLOGY:   DIAGNOSTIC TESTING:  [ ] Echocardiogram:  [ ]  Catheterization:  [ ] Stress Test:    OTHER:

## 2023-10-12 NOTE — PROGRESS NOTE ADULT - SUBJECTIVE AND OBJECTIVE BOX
Patient is a 73y old  Female who presents with a chief complaint of Respiratory distress (12 Oct 2023 13:44)      SUBJECTIVE / OVERNIGHT EVENTS: HD MWF, unable to effectively remove volume 2/2 hypotension despite using Midodrine    MEDICATIONS  (STANDING):  albuterol    0.083% 2.5 milliGRAM(s) Nebulizer every 6 hours  apixaban 5 milliGRAM(s) Oral every 12 hours  artificial tears (preservative free) Ophthalmic Solution 1 Drop(s) Both EYES every 4 hours  atorvastatin 10 milliGRAM(s) Oral at bedtime  chlorhexidine 0.12% Liquid 15 milliLiter(s) Oral Mucosa every 12 hours  chlorhexidine 2% Cloths 1 Application(s) Topical daily  dextrose 5%. 1000 milliLiter(s) (50 mL/Hr) IV Continuous <Continuous>  dextrose 5%. 1000 milliLiter(s) (100 mL/Hr) IV Continuous <Continuous>  dextrose 50% Injectable 12.5 Gram(s) IV Push once  dextrose 50% Injectable 25 Gram(s) IV Push once  dextrose 50% Injectable 25 Gram(s) IV Push once  dextrose 50% Injectable 25 Gram(s) IV Push once  epoetin odalis (EPOGEN) Injectable 70378 Unit(s) IV Push <User Schedule>  ferrous    sulfate Liquid 300 milliGRAM(s) Oral daily  glucagon  Injectable 1 milliGRAM(s) IntraMuscular once  insulin lispro (ADMELOG) corrective regimen sliding scale   SubCutaneous every 6 hours  insulin NPH human recombinant 6 Unit(s) SubCutaneous every 6 hours  levETIRAcetam  Solution 1000 milliGRAM(s) Oral daily  levETIRAcetam  Solution 250 milliGRAM(s) Oral <User Schedule>  midodrine. 30 milliGRAM(s) Oral every 8 hours  pantoprazole  Injectable 40 milliGRAM(s) IV Push two times a day  petrolatum Ophthalmic Ointment 1 Application(s) Both EYES two times a day  polymyxin B IVPB 900776 Unit(s) IV Intermittent every 12 hours  sodium chloride 1 Gram(s) Oral two times a day    MEDICATIONS  (PRN):  acetaminophen   Oral Liquid .. 650 milliGRAM(s) Oral every 6 hours PRN Temp greater or equal to 38C (100.4F), Mild Pain (1 - 3)  calamine/zinc oxide Lotion 1 Application(s) Topical two times a day PRN Rash and/or Itching  dextrose Oral Gel 15 Gram(s) Oral once PRN Blood Glucose LESS THAN 70 milliGRAM(s)/deciliter  sodium chloride 0.9% lock flush 10 milliLiter(s) IV Push every 1 hour PRN Pre/post blood products, medications, blood draw, and to maintain line patency      Vital Signs Last 24 Hrs  T(F): 99.3 (10-12-23 @ 20:00), Max: 100 (10-12-23 @ 08:48)  HR: 109 (10-12-23 @ 20:00) (103 - 123)  BP: 103/43 (10-12-23 @ 20:00) (97/37 - 121/53)  RR: 22 (10-12-23 @ 20:00) (22 - 22)  SpO2: 98% (10-12-23 @ 20:00) (95% - 100%)  Telemetry:   CAPILLARY BLOOD GLUCOSE      POCT Blood Glucose.: 146 mg/dL (12 Oct 2023 17:19)  POCT Blood Glucose.: 179 mg/dL (12 Oct 2023 11:15)  POCT Blood Glucose.: 185 mg/dL (12 Oct 2023 05:22)  POCT Blood Glucose.: 167 mg/dL (11 Oct 2023 23:40)    I&O's Summary    11 Oct 2023 07:01  -  12 Oct 2023 07:00  --------------------------------------------------------  IN: 1080 mL / OUT: 0 mL / NET: 1080 mL    12 Oct 2023 07:01  -  12 Oct 2023 21:29  --------------------------------------------------------  IN: 400 mL / OUT: 1600 mL / NET: -1200 mL        PHYSICAL EXAM:  GENERAL: NAD, well-developed  HEAD:  Atraumatic, Normocephalic  EYES: EOMI, PERRLA, conjunctiva and sclera clear  NECK: Supple, No JVD  CHEST/LUNG: Clear to auscultation bilaterally; No wheeze  HEART: Regular rate and rhythm; No murmurs, rubs, or gallops  ABDOMEN: Soft, Nontender, Nondistended; Bowel sounds present  EXTREMITIES:  2+ Peripheral Pulses, No clubbing, cyanosis, or edema  PSYCH: AAOx3  NEUROLOGY: non-focal  SKIN: No rashes or lesions    LABS:                        7.5    13.54 )-----------( 321      ( 12 Oct 2023 06:29 )             23.8     10-12    131<L>  |  96<L>  |  28<H>  ----------------------------<  179<H>  4.2   |  27  |  1.35<H>    Ca    8.1<L>      12 Oct 2023 06:29  Phos  2.5     10-12  Mg     1.90     10-12            Urinalysis Basic - ( 12 Oct 2023 06:29 )    Color: x / Appearance: x / SG: x / pH: x  Gluc: 179 mg/dL / Ketone: x  / Bili: x / Urobili: x   Blood: x / Protein: x / Nitrite: x   Leuk Esterase: x / RBC: x / WBC x   Sq Epi: x / Non Sq Epi: x / Bacteria: x        RADIOLOGY & ADDITIONAL TESTS:    Imaging Personally Reviewed:    Consultant(s) Notes Reviewed:      Care Discussed with Consultants/Other Providers:

## 2023-10-12 NOTE — PROGRESS NOTE ADULT - ASSESSMENT
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 While in MICU patient noted with worsening renal failure requiring HD initiation, CGE/ Melena s/p EGD 8/31 found with esophagitis and erosive gastropathy, new right cerebellar infarct and fevers second to ESBL COLI and MSSA VAP, MSSA bacteremia, left ear otitis externa and drug rxn to cephalosporins. 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 Case discussed at length renal and given good UOP attempted renal recovery, however failed, and upon reinitiation of HD attempt. R IJ SHILEY failed. Attempted volume removal with Bumex GTT however course complicated by hypotension requiring transfer back to MICU 9/26 for pressor support. Diuresis dc'ed, pressors weaned off and stress steroids started. L IJ SHILEY placed (9/30) and HD reinstated. Course further complicated by AOCD and  PSA and Proteus VAP. Patient transferred back to RCU 10/1 with course complicated by volume overload and grossly resistant organisms.    NEUROLOGY  # NEW cerebella infarct   - Baseline MS AOX3 with short term memory changes per family however noted with concern for poor mentation in ICU  - MRI (8/17) with NEW right cerebellar infarct and old left PCA/occipital infarcts  - STEPHANIE (8/17) with AV showing two linear mobile echogenic elements attached to valve leaflets consistent with Lambel's excrescence, but no TRINITY thrombus, and lambel's excrescence with uncertain clinical implication.   - EEG (8/11-8/15) with no seizures   - ILR interrogation (9/7) with SR and PACs falsely recorded as AF.   - Continue on ASA and lipitor for medical management   - DC ASA per neuro - on ELiquis again     # Seizures   - Course complicated by questionable head and body shaking with prolactin elevated 9/8. Discussed for spot EEG however held given low likelihood of seizures noted and acute respiratory illnesses   - Course further complicated with right arm twitching and RPT EEG (10/4) with no seizure however but noted with increase seizure potential in left posterior quadrants.   - RPT EEG (10/5) with possible focal seizures and risk of focal-onset seizures from multiple locations, most prominent in the left posterior temporal/posterocentral region  - RPT EEG (10/6) with RUE twitching are likely non-epileptic in nature, however risk of focal-onset seizures from multiple locations  - Continue on Keppra PPX     # Metabolic vs Uremic Encephalopathy   - Lethargy noted with progression to stupor thought to be second to uremia.   - RPT CTH (9/26) with vague areas of hypoattenuation within the bilateral cerebellar hemispheres which may be compatible with acute/subacute ischemia.   - Discussed CTH with neurology and no signs of new infarct noted.   - HD reinstated in ICU with improvement in uremia however mentation remained   - Poor mentation likely in setting of toxic encephalopathy in setting of infections.     CARDIOVASCULAR  # HTN Urgency   # Septic vs vasoplegic shock   - Labile BPs ranging in between SBP 80s-200s and attempted labetalol, however noted with hypotension and bradycardia likely from BB toxicity vs shock state?    - Holding Labetalol, Catapres and Catapres.    - Attempted volume removal with bumex diuresis however noted with return of shock state requiring pressor support and transfer back to ICU.   - Pressors weaned off to stress dose (last day 10/4) and Midodrine   - Continue on Midodrine 30 TID (9/29 - ) and ensure timed 30-60 minutes prior to HD  - DO NOT HOLD MIDODRINE   - BP improved s/p albumin     # Hx of HFpEF with likely ADHF with volume overload.   - ECHO (7/2023) with EF 59 with severe LVH and diastolic dysfunction   - STEPHANIE (8/18) with normal LVRVSF however noted with increased LA pressures and persistent LA septal bowing into RA.   - ECHO (8/11) with EF 60 with mild LVH, normal LVRVSF, and mild ddfxn.  - Grossly volume overloaded and removing volume with HD midodrine assisted sessions     HEENT   # Left ear OE with acute on chronic left-sided otomastoiditis  - ENT evaluation (9/7) with no mastoid tenderness, however found with positive swelling and excoriation to left auricle and tenderness to manipulation of auricle. Debrided crusting of auricle and EAC evaluated with edema and unable to visualize TM. EAC debrided and found with watery exudate.   - CT IAC with acute on chronic left-sided otitis externa and acute on chronic left-sided otomastoiditis. Superimposed left-sided tympanosclerosis. Superimposed cholesteatoma formation within the left tympanomastoid cavity cannot be excluded  - Completed CiproHC (9/5 - 9/16), Bradford (9/1-10/1) and acetic acid and bacitracin.   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   - Continue on empiric valtrex 500mg BID (9/29 - 10/8) per ophtho and ID  - Continue preservative free artificial tears 4 times a day in the both eye  - Continue lacrilube ointment twice a day in the both eyes     # Oral Lesions with questionable Zoster vs Trauma?   - Patient with tongue pain and seen with anterior ulcerations consolidating and crusting and posterior ulceration.  - Case discussed with pathology resident and culture/ cytology sent.   - HSV negative and Path (9/6) with normal appearing epithelial cells singly and in clusters devoid of viral cytopathic effect.   - Continue on magic mouth wash for pain    RESPIRATORY  # AHHRF second to volume overload   - Hx of CKD and HFpEF presented with SOB and hypercarbia second to volume overload requiring intubation and HD initiation   - Vent weaning attempted however limited requiring tracheostomy (9/1) with IP   - Course complicated by PIPs elevated and found with tracheomalacia and volume overloaded.   - CT CHEST (9/8) with tracheomalacia, BL pleural effusions and continued consolidations   - Continue on vent and given TBM increased PEEP to 8 with improvement in dynamic collapse, but PIP waxes and wanes with volume overloaded and secretions.  - Continue on albuterol and chest PT  - Continue volume removal with HD   - Attempted HTS but noted with hemoptysis and held   - Hold Atrovent given thick secretions   - Hold IPV given high inspiratory pressures   - Vent changed to PS with improvement - VBG good   - Had inc wob /hypoxic poor volumes today placed back on volume control but pressures increasing pt required positioning to help with volumes - back on pressure control with better volumes /dec pressure   - Rpeat ABG done -ok      GI  # UGIB   - CGE x 1 episode on 8/24 and melena x 2 episodes on 8/31 likely residual blood.   - EGD (8/31) found with esophagitis and erosive gastropathy  - Continue on PPI BID through late October and then PPI QD     # Dysphagia   - NGT-TF continued   - Pending PEG however needs improvement prior to placement.     RENAL  # Progressive CKD   - Presented volume overload and progressive RUSS on CKD   - POCUS with no signs of hydronephrosis in ICU  - Pressor support started with improvement in renal function in ICU  - Attempted steroid course in ICU without improvement in renal function   - Case discussed at length with renal and initiated on HD in ICU   - Remain on HD while in RCU however noted to be volume overloaded. Attempted Bumex pushes and patient noted with good UOP.   - Attempted renal recovery in RCU however noted with decent UOP, but BUN/ CRE continued to rise without improvement on diuresis.   - Ultimately resumed on HD MWF TIW with midodrine assisted volume removal, however pressure remains soft with difficulty removing aggressive fluids.     # Hyponatremia   - Continue on salt tabs 1G  (decreased 10/6) and weaning off as tolerated with volume removal   - Monitor sodium     # Hyperphosphatemia to Hypophosphatemia with HD  - PTH normal and elevated phos likely from renal failure  - Phos binder with Renvela started with improvement however then noted with hypophosphatemia and Renvela stopped.   - required replacment KPHOS today with good response - continue to monitor     INFECTIOUS DISEASE  # ESBL ECOLI and MSSA VAP c/b MSSA bacteremia   - RVP/ COVID (8/11) negative   - SCx (8/11) with MSSA s/p cefepime (8/12-8/13) and then ancef (8/14) however noted with drug reaction and then changed to vanco and aztreonam (8/12-8/13) in ICU .   - Course complicated by recurrent fevers and return of MSSA with bacteremia.   - SCx (9/1) with MSSA and ESBL ECOLI   - BAL (9/1) with MSSA   - BCx (9/1) with MSSA and cleared on (9/4)   - ECHO (9/6) unable to rule out endocarditis   - Continue on Vanco (9/4-9/22) however noted with rashes of uncertain etiology and replaced with Bradford (9/4 - 10/2) and DAPTO (9/26-10/2) for  completion.     # Proteus and  PSA VAP/ Trachitis   - Continued fevers and SCx (9/29) with MDR  PSA and Proteus   - Continued on Bradford as above however noted with resistance and changed to Zosyn (10/2 - 10/5) with course complicated by possible drug rxn. Zosyn changed to Aztreonam (10/5 - 10/6) however RPT SCx (10/3) with  PSA however only intermediate coverage to aztreonam and amikacin.  PSA grossly resistant to other antibiotics other than cephalosporins however patient with reactions in the past. Case discussed with ID and ID pharmacist and change to Polymyxin (10/6 - ) however continues to spike fevers likely second to Polymyxin only intermediate coverage and Recarbio resistant .   - Overall difficulty with ABX tailoring given allergic rxns and resistant organisms.   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  - Monitor HH     VASCULAR   # LLE POP DVT   - US BL LE (9/10) with acute left deep vein thrombosis of the left popliteal vein.  - RUE swollen and VA DOPPLER RUE (10/3) with R IJ DVT and R brachial DVT.  - Eliquis held for permacath however procedure held.   - Eliquis remains on hold second to mild suction trauma hemoptysis.   - Resumed Eliquis     # HD access  - L IJ ALEXXRENZO (9/27 - )   - Planned for IR permacath cancelled for today and will reattempt when infectious status improves.     ONC   # Hx of Breast CA   - Patient dx in 2018 and s/p BL mastectomy (radical on right) and chemo and RT.   - Supportive care.     ENDOCRINE  # IDDM2   - Continued on NPH with ISS, however noted with hypoglycemic episodes and NPH dc'ed.    - Finger sticks trending up off NPH.   - Continue on NPH 6U with moderate ISS.   - Adjust as need     SKIN  # Drug Eruption   - Attempted cefepime (8/12) vs ancef (8/13-8/14) and then noted with drug rxn in ICU. Continue on steroids with prednisone 40 (8/18 - 9/2) then prednisone 30 (9/3-9/7), then prednisone 20 (9/8 - 9/10), then prednisone 10mg (9/11-9/13) then turn off.   - Attempted Zosyn (10/2-10/5) with possible rash. Zosyn turned off with improvement.   - Hold further cephalosporins or PCNs at this time   - Monitor for further drug rxns.     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 While in MICU patient noted with worsening renal failure requiring HD initiation, CGE/ Melena s/p EGD 8/31 found with esophagitis and erosive gastropathy, new right cerebellar infarct and fevers second to ESBL COLI and MSSA VAP, MSSA bacteremia, left ear otitis externa and drug rxn to cephalosporins. 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 Case discussed at length renal and given good UOP attempted renal recovery, however failed, and upon reinitiation of HD attempt. R IJ SHILEY failed. Attempted volume removal with Bumex GTT however course complicated by hypotension requiring transfer back to MICU 9/26 for pressor support. Diuresis dc'ed, pressors weaned off and stress steroids started. L IJ SHILEY placed (9/30) and HD reinstated. Course further complicated by AOCD and  PSA and Proteus VAP. Patient transferred back to RCU 10/1 with course complicated by volume overload and grossly resistant organisms.    NEUROLOGY  # NEW cerebella infarct   - Baseline MS AOX3 with short term memory changes per family however noted with concern for poor mentation in ICU  - MRI (8/17) with NEW right cerebellar infarct and old left PCA/occipital infarcts  - STEPHANIE (8/17) with AV showing two linear mobile echogenic elements attached to valve leaflets consistent with Lambel's excrescence, but no TRINITY thrombus, and lambel's excrescence with uncertain clinical implication.   - EEG (8/11-8/15) with no seizures   - ILR interrogation (9/7) with SR and PACs falsely recorded as AF.   - Continue on ASA and lipitor for medical management   - DC ASA per neuro - on ELiquis again     # Seizures   - Course complicated by questionable head and body shaking with prolactin elevated 9/8. Discussed for spot EEG however held given low likelihood of seizures noted and acute respiratory illnesses   - Course further complicated with right arm twitching and RPT EEG (10/4) with no seizure however but noted with increase seizure potential in left posterior quadrants.   - RPT EEG (10/5) with possible focal seizures and risk of focal-onset seizures from multiple locations, most prominent in the left posterior temporal/posterocentral region  - RPT EEG (10/6) with RUE twitching are likely non-epileptic in nature, however risk of focal-onset seizures from multiple locations  - Continue on Keppra PPX     # Metabolic vs Uremic Encephalopathy   - Lethargy noted with progression to stupor thought to be second to uremia.   - RPT CTH (9/26) with vague areas of hypoattenuation within the bilateral cerebellar hemispheres which may be compatible with acute/subacute ischemia.   - Discussed CTH with neurology and no signs of new infarct noted.   - HD reinstated in ICU with improvement in uremia however mentation remained   - Poor mentation likely in setting of toxic encephalopathy in setting of infections.     CARDIOVASCULAR  # HTN Urgency   # Septic vs vasoplegic shock   - Labile BPs ranging in between SBP 80s-200s and attempted labetalol, however noted with hypotension and bradycardia likely from BB toxicity vs shock state?    - Holding Labetalol, Catapres and Catapres.    - Attempted volume removal with bumex diuresis however noted with return of shock state requiring pressor support and transfer back to ICU.   - Pressors weaned off to stress dose (last day 10/4) and Midodrine   - Continue on Midodrine 30 TID (9/29 - ) and ensure timed 30-60 minutes prior to HD  - DO NOT HOLD MIDODRINE   - BP improved s/p albumin     # Hx of HFpEF with likely ADHF with volume overload.   - ECHO (7/2023) with EF 59 with severe LVH and diastolic dysfunction   - STEPHANIE (8/18) with normal LVRVSF however noted with increased LA pressures and persistent LA septal bowing into RA.   - ECHO (8/11) with EF 60 with mild LVH, normal LVRVSF, and mild ddfxn.  - Grossly volume overloaded and removing volume with HD midodrine assisted sessions     HEENT   # Left ear OE with acute on chronic left-sided otomastoiditis  - ENT evaluation (9/7) with no mastoid tenderness, however found with positive swelling and excoriation to left auricle and tenderness to manipulation of auricle. Debrided crusting of auricle and EAC evaluated with edema and unable to visualize TM. EAC debrided and found with watery exudate.   - CT IAC with acute on chronic left-sided otitis externa and acute on chronic left-sided otomastoiditis. Superimposed left-sided tympanosclerosis. Superimposed cholesteatoma formation within the left tympanomastoid cavity cannot be excluded  - Completed CiproHC (9/5 - 9/16), Bradford (9/1-10/1) and acetic acid and bacitracin.   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   - Continue on empiric valtrex 500mg BID (9/29 - 10/8) per ophtho and ID  - Continue preservative free artificial tears 4 times a day in the both eye  - Continue lacrilube ointment twice a day in the both eyes   - Ophthalmology reconsulted (10/12) given L-eye injection     # Oral Lesions with questionable Zoster vs Trauma?   - Patient with tongue pain and seen with anterior ulcerations consolidating and crusting and posterior ulceration.  - Case discussed with pathology resident and culture/ cytology sent.   - HSV negative and Path (9/6) with normal appearing epithelial cells singly and in clusters devoid of viral cytopathic effect.   - Continue on magic mouth wash for pain    RESPIRATORY  # AHHRF second to volume overload   - Hx of CKD and HFpEF presented with SOB and hypercarbia second to volume overload requiring intubation and HD initiation   - Vent weaning attempted however limited requiring tracheostomy (9/1) with IP   - Course complicated by PIPs elevated and found with tracheomalacia and volume overloaded.   - CT CHEST (9/8) with tracheomalacia, BL pleural effusions and continued consolidations   - Continue on vent and given TBM increased PEEP to 8 with improvement in dynamic collapse, but PIP waxes and wanes with volume overloaded and secretions.  - Continue on albuterol and chest PT  - Continue volume removal with HD   - Attempted HTS but noted with hemoptysis and held   - Hold Atrovent given thick secretions   - Hold IPV given high inspiratory pressures   - Vent changed to PS with improvement - VBG good   - Had inc wob /hypoxic poor volumes today placed back on volume control but pressures increasing pt required positioning to help with volumes - back on pressure control with better volumes /dec pressure   - Rpeat ABG done -ok      GI  # UGIB   - CGE x 1 episode on 8/24 and melena x 2 episodes on 8/31 likely residual blood.   - EGD (8/31) found with esophagitis and erosive gastropathy  - Continue on PPI BID through late October and then PPI QD     # Dysphagia   - NGT-TF continued   - Pending PEG however needs improvement prior to placement.     RENAL  # Progressive CKD   - Presented volume overload and progressive RUSS on CKD   - POCUS with no signs of hydronephrosis in ICU  - Pressor support started with improvement in renal function in ICU  - Attempted steroid course in ICU without improvement in renal function   - Case discussed at length with renal and initiated on HD in ICU   - Remain on HD while in RCU however noted to be volume overloaded. Attempted Bumex pushes and patient noted with good UOP.   - Attempted renal recovery in RCU however noted with decent UOP, but BUN/ CRE continued to rise without improvement on diuresis.   - Ultimately resumed on HD MWF TIW with midodrine assisted volume removal, however pressure remains soft with difficulty removing aggressive fluids.   - D/w Renal for additional UF session (10/12)    # Hyponatremia   - Continue on salt tabs 1G  (decreased 10/6) and weaning off as tolerated with volume removal   - Monitor sodium     # Hyperphosphatemia to Hypophosphatemia with HD  - PTH normal and elevated phos likely from renal failure  - Phos binder with Renvela started with improvement however then noted with hypophosphatemia and Renvela stopped.   - required replacment KPHOS today with good response - continue to monitor     INFECTIOUS DISEASE  # ESBL ECOLI and MSSA VAP c/b MSSA bacteremia   - RVP/ COVID (8/11) negative   - SCx (8/11) with MSSA s/p cefepime (8/12-8/13) and then ancef (8/14) however noted with drug reaction and then changed to vanco and aztreonam (8/12-8/13) in ICU .   - Course complicated by recurrent fevers and return of MSSA with bacteremia.   - SCx (9/1) with MSSA and ESBL ECOLI   - BAL (9/1) with MSSA   - BCx (9/1) with MSSA and cleared on (9/4)   - ECHO (9/6) unable to rule out endocarditis   - Continue on Vanco (9/4-9/22) however noted with rashes of uncertain etiology and replaced with Bradford (9/4 - 10/2) and DAPTO (9/26-10/2) for  completion.     # Proteus and  PSA VAP/ Trachitis   - Continued fevers and SCx (9/29) with MDR  PSA and Proteus   - Continued on Bradford as above however noted with resistance and changed to Zosyn (10/2 - 10/5) with course complicated by possible drug rxn. Zosyn changed to Aztreonam (10/5 - 10/6) however RPT SCx (10/3) with  PSA however only intermediate coverage to aztreonam and amikacin.  PSA grossly resistant to other antibiotics other than cephalosporins however patient with reactions in the past. Case discussed with ID and ID pharmacist and change to Polymyxin (10/6 - ) however continues to spike fevers likely second to Polymyxin only intermediate coverage and Recarbio resistant .   - Overall difficulty with ABX tailoring given allergic rxns and resistant organisms.   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  - Monitor HH     VASCULAR   # LLE POP DVT   - US BL LE (9/10) with acute left deep vein thrombosis of the left popliteal vein.  - RUE swollen and VA DOPPLER RUE (10/3) with R IJ DVT and R brachial DVT.  - Eliquis held for permacath however procedure held.   - Eliquis remains on hold second to mild suction trauma hemoptysis.   - Resumed Eliquis     # HD access  - L IJ CLAUDIA (9/27 - )   - Planned for IR permacath cancelled for today and will reattempt when infectious status improves.     ONC   # Hx of Breast CA   - Patient dx in 2018 and s/p BL mastectomy (radical on right) and chemo and RT.   - Supportive care.     ENDOCRINE  # IDDM2   - Continued on NPH with ISS, however noted with hypoglycemic episodes and NPH dc'ed.    - Finger sticks trending up off NPH.   - Continue on NPH 6U with moderate ISS.   - Adjust as need     SKIN  # Drug Eruption   - Attempted cefepime (8/12) vs ancef (8/13-8/14) and then noted with drug rxn in ICU. Continue on steroids with prednisone 40 (8/18 - 9/2) then prednisone 30 (9/3-9/7), then prednisone 20 (9/8 - 9/10), then prednisone 10mg (9/11-9/13) then turn off.   - Attempted Zosyn (10/2-10/5) with possible rash. Zosyn turned off with improvement.   - Hold further cephalosporins or PCNs at this time   - Monitor for further drug rxns.     ETHICS/ GOC    - Attempted palliative discussion however family not interested and wishes for FULL CODE    DISPO - TBD

## 2023-10-12 NOTE — PROGRESS NOTE ADULT - ASSESSMENT
74 y/o F [facility] she was admitted at [facility] 7/12-7/22 w aspiration PNA, was treated w CEFEPIME, developed an allergic rash,  dCHF, + MAC on AFB culture, had been progressively getting more and more lethargic and dyspneic at home since DC.   In  am of 8/11/23  ptn presented with respiratory distress w hypoxia and hypercarbia requiring intubation 2/2 volume overload +/- Asp PNA      Neuro   not responsive  Baseline MS AOx3, aphasic   - h/o CVA , on aspirin and statin . resumed w feeding tube, ASA resumed 8/26  - eeg  2/2 tremors, no sz focus, on KEPPRA  - less responsive in the past few days  - MRI 8/17:  new R cerebellar infarct, old left PCA/Occipital infarct. probably embolic in nature, did not tolerate full AC in the past, STEPHANIE is neg , no shunts observed  - ptn is poorly reactive, rpt scan done, no further CVA seen.     Cardiac   cardiology following  CHFpEF   TTE 7/2023 with EF 59%, with severe LVH and diastolic dysfunction       Pulmonary   Acute hypercapnic and hypoxemic respiratory failure   prolonged intubation, now  trache to  vent, has copious secretions      Renal   on HD via L IJ Shiley, awaiting tunneled catheter when cleared by RCU team  HD MWF, midodrine assisted, still unable to remove volume effectively 2/2 hypotension  permacath when clinically stable      GI  NPO  on tube feeds  coffee ground emesis x 1 8/24, melena overnight 8/31, s/p 1UPRBC, EGD/Colonoscopy: erosive gastropathy, esophagisits, on colonoscopy old blood seen no active bleeding, poor prep  family to decide re PEG placement    Endocrine  on Insulin: NPH, Admelog    ID   complicated infectious hospital course  treated for MSSA bacteremia, aspiration PNA.   finishing course of Abx for P. aeruginosa PNA. presently on Polymyxin B , completing 10/12 since it was presumed she was allergic to Aztreonam.   ID course complicated with muliple ABx allergies  also treated for Left O. 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 and she remains full code at present

## 2023-10-12 NOTE — PROGRESS NOTE ADULT - NS ATTEND AMEND GEN_ALL_CORE FT
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 ESRD s/p ICU and intubation. Course further c/b GIB s/p EGD, new R cerebellar infarct, and pneumonia/bacteremia, and prolonged respiratory failure require tracheostomy.       #NEURO  Previous episodes of possible seizure, EEG with possible focal seizure with RUE twitching and at risk of focal onset seizures from multiple locations, especially left posterior temporal/posterocentral region, likely due to encephalomalacia/gliosis from prior left parietal CVA. Also with underlying encephalopathy due to acute illnes + RUSS + sepsis + history of CVA's (MICU course complicated by new R cerebellar, midbrain, and multiple tiny embolic infarcts as well as known left PCA CVA). Repeat CT head on 10/3 with no acute changes. Ammonia normal. BUN improved. Continue vEEG monitoring.  - Started on levetiracetam.   - no evidence of seizure today, remains unresponsive, when stable will send for MRI  - dc asa while on apixaban    #Renal  -HD with fluid removal per renal. Hold HD if hypotensive, midodrine to be given prior - will discuss with nephrology, may benefit from extra HD days for additional fluid removal   - continues to be grossly fluid overloaded on exam   - stable H/H. Anemia of CKD, continue Epoetin per nephrology.     #ID  - Repeat sputum culture with numerous carbapenem-resistant Pseudomonas aeruginosa. However, she is allergic to PCNs and cephalosporins. Appreciate ID follow-up, currently on Polymyxin B. Appreciate ENT follow-up for left ear otitis externa, which has now resolved. Follow-up ophthalmology regarding keratitis + chemosis, on Lacrilube and artificial tears.   - ongoing sepsis with no better antibiotic options given allergies and resistance profile  - appreciate ID input    #CVS  - hypotensive, Continue midodrine 30 mg q8h.  -  Continue aspirin and statin given h/o CVA  - On apixaban for DVT.      #Pulm   - Continue lung protective ventilation. On 10/11 Very high peak pressures on VC - PIP 65 on ,   RR 22, FiO2 35 - inadequate volumes, repositoined as diaphragmatic compression worsened high PIP - when lowered legs, head of bed improved PIP. Switched back to PC 45/8, with volumes of ~310-320 cc. VBG appreciated  - post fluid removal by HD pressures are improved, titrated down PC top 40/8 with volumes ~320 cc, continue to adjust as tolerated  - Airway clearance therapy with albuterol  - Bloody secretions noted upon suction on 10/9- FiO2 requirements unchanged. Will discontinue hypertonic saline, continue albuterol. Monitor closely, if develops antonina hemoptysis start TXA nebs 500 mg q8h standing for 72 hrs  - no further hemoptysis today     #GI  tube feeds as tolerates. PPI for GI ppx.  - check LFTs    #GOALS OF CARE   Goals of care meeting with daughter and , BETTINA Candelario and Nurse Manager present on 10/9/ We discussed Ms. Barraza's current clinical condition detailing management above. All questions from family answered. I informed family that despite all our efforts she is extremely ill and at risk for further deterioration. They understand and wish to pursue all possible medical interventions. She will remain full code.

## 2023-10-13 NOTE — CHART NOTE - NSCHARTNOTEFT_GEN_A_CORE
Notified by RN that patient pulling low tidal volumes. Provider and RT at bedside. Attempted to switch patient to VC and then patient began to have high pressures. Switched back to PC and repositioned patient to offload diaphragm, however TVs still remaining below 200. Fio2 60% spO2 sat remains 94-98%. No secretions noted on suctioning. No improvement w/ duoneb. Similar episode noted during day yesterday. STAT CXR ordered. ICU Dr Avina came bedside. Adjusted patients trach position and extended neck. TVs improved to 350-380 still on PC. Will maintain this position. Patient appears more comfortable, spO2 99%,, . Daughter and  bedside and updated. All questions answered. Will continue to monitor closely.

## 2023-10-13 NOTE — PROGRESS NOTE ADULT - ASSESSMENT
74 y/o F [facility]. she was admitted at [facility] 7/12-7/22 w aspiration PNA, was treated w CEFEPIME, developed an allergic rash,  dCHF, + MAC on AFB culture, had been progressively getting more and more lethargic and dyspneic at home since DC.   In  am of 8/11/23  ptn presented with respiratory distress w hypoxia and hypercarbia requiring intubation 2/2 volume overload +/- Asp PNA      Neuro   not responsive  Baseline MS AOx3, aphasic   - h/o CVA , on aspirin and statin . resumed w feeding tube, ASA resumed 8/26  - eeg  2/2 tremors, no sz focus, on KEPPRA  - less responsive in the past few days  - MRI 8/17:  new R cerebellar infarct, old left PCA/Occipital infarct. probably embolic in nature, did not tolerate full AC in the past, STEPHANIE is neg , no shunts observed  - ptn is poorly reactive, rpt scan done, no further CVA seen.     Cardiac   cardiology following  CHFpEF   TTE 7/2023 with EF 59%, with severe LVH and diastolic dysfunction       Pulmonary   Acute hypercapnic and hypoxemic respiratory failure   prolonged intubation, now  trache to  vent, has copious secretions      Renal   on HD via L IJ Shiley, awaiting tunneled catheter when cleared by RCU team  HD MWF, midodrine assisted, PUF in between HD days, unable to remove proper volume 2/2 hypotension.   permacath when clinically stable      GI  NPO  on tube feeds  coffee ground emesis x 1 8/24, melena overnight 8/31, s/p 1UPRBC, EGD/Colonoscopy: erosive gastropathy, esophagisits, on colonoscopy old blood seen no active bleeding, poor prep  family to decide re PEG placement    Endocrine  on Insulin: NPH, Admelog    ID   complicated infectious hospital course  treated for MSSA bacteremia, aspiration PNA.   finishing course of Abx for P. aeruginosa PNA. presently on Polymyxin B , completed course 10/12 as per ID, consider DC    ID course complicated with muliple ABx allergies  also treated for Left O. 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 and she remains full code at present

## 2023-10-13 NOTE — PROGRESS NOTE ADULT - ASSESSMENT
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 ESRD s/p ICU and intubation. Course further c/b GIB s/p EGD, new R cerebellar infarct, and pneumonia/bacteremia, and prolonged respiratory failure require tracheostomy.       #NEURO  Previous episodes of possible seizure, EEG with possible focal seizure with RUE twitching and at risk of focal onset seizures from multiple locations, especially left posterior temporal/posterocentral region, likely due to encephalomalacia/gliosis from prior left parietal CVA. Also with underlying encephalopathy due to acute illnes + RUSS + sepsis + history of CVA's (MICU course complicated by new R cerebellar, midbrain, and multiple tiny embolic infarcts as well as known left PCA CVA). Repeat CT head on 10/3 with no acute changes. Ammonia normal. BUN improved. Continue vEEG monitoring.  -  on levetiracetam.   - no evidence of seizure today, remains unresponsive, when stable will send for MRI  - dc asa while on apixaban    #Renal  -HD with fluid removal per renal. Hold HD if hypotensive, midodrine to be given prior - will discuss with nephrology, may benefit from extra HD days for additional fluid removal   - continues to be grossly fluid overloaded on exam   -  Anemia of CKD, continue Epoetin per nephrology. Hb slowly decreasing, likely transfuse 1 u pRBC today   - tolerating HD today     #ID  - Repeat sputum culture with numerous carbapenem-resistant Pseudomonas aeruginosa. However, she is allergic to PCNs and cephalosporins. Appreciate ID follow-up, currently on Polymyxin B. Appreciate ENT follow-up for left ear otitis externa, which has now resolved. Follow-up ophthalmology regarding keratitis + chemosis, on Lacrilube and artificial tears.   - ongoing sepsis with no better antibiotic options given allergies and resistance profile  - appreciate ID input    #CVS  - hypotensive, Continue midodrine 30 mg q8h.  -  Continue aspirin and statin given h/o CVA  - On apixaban for DVT.      #Pulm   - Continue lung protective ventilation. On 10/11 Very high peak pressures on VC - PIP 65 on ,   RR 22, FiO2 35 - inadequate volumes, repositoined as diaphragmatic compression worsened high PIP - when lowered legs, head of bed improved PIP. Switched back to PC 45/8, with volumes of ~310-320 cc.    - post fluid removal by HD pressures are improved, titrated down PC top 40/8 with volumes ~320 cc, continue to adjust as tolerated - most recent gas (labeled ABG, likely VBG) with worsening respiratory acidosis - will repeat and obtain true ABG, increase RR - continue to monitor TV, if decreases will increase Pressure to 45/8  - Airway clearance therapy with albuterol  - Bloody secretions noted upon suction on 10/9- FiO2 requirements unchanged. Will discontinue hypertonic saline, continue albuterol. Monitor closely, if develops antonina hemoptysis start TXA nebs 500 mg q8h standing for 72 hrs  - no further hemoptysis today     #GI  tube feeds as tolerates. PPI for GI ppx.  - check LFTs    #GOALS OF CARE   Goals of care meeting with daughter and , BETTINA Candelario and Nurse Manager present on 10/9/ We discussed Ms. Barraza's current clinical condition detailing management above. All questions from family answered. I informed family that despite all our efforts she is extremely ill and at risk for further deterioration. They understand and wish to pursue all possible medical interventions. She will remain full code.      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 While in MICU patient noted with worsening renal failure requiring HD initiation, CGE/ Melena s/p EGD 8/31 found with esophagitis and erosive gastropathy, new right cerebellar infarct and fevers second to ESBL COLI and MSSA VAP, MSSA bacteremia, left ear otitis externa and drug rxn to cephalosporins. 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 Case discussed at length renal and given good UOP attempted renal recovery, however failed, and upon reinitiation of HD attempt. R IJ SHILEY failed. Attempted volume removal with Bumex GTT however course complicated by hypotension requiring transfer back to MICU 9/26 for pressor support. Diuresis dc'ed, pressors weaned off and stress steroids started. L IJ SHILEY placed (9/30) and HD reinstated. Course further complicated by AOCD and  PSA and Proteus VAP. Patient transferred back to RCU 10/1 with course complicated by volume overload and grossly resistant organisms.    NEUROLOGY  # NEW cerebella infarct   - Baseline MS AOX3 with short term memory changes per family however noted with concern for poor mentation in ICU  - MRI (8/17) with NEW right cerebellar infarct and old left PCA/occipital infarcts  - STEPHANIE (8/17) with AV showing two linear mobile echogenic elements attached to valve leaflets consistent with Lambel's excrescence, but no TRINITY thrombus, and lambel's excrescence with uncertain clinical implication.   - EEG (8/11-8/15) with no seizures   - ILR interrogation (9/7) with SR and PACs falsely recorded as AF.   - Continue on ASA and lipitor for medical management   - DC ASA per neuro - on ELiquis again     # Seizures   - Course complicated by questionable head and body shaking with prolactin elevated 9/8. Discussed for spot EEG however held given low likelihood of seizures noted and acute respiratory illnesses   - Course further complicated with right arm twitching and RPT EEG (10/4) with no seizure however but noted with increase seizure potential in left posterior quadrants.   - RPT EEG (10/5) with possible focal seizures and risk of focal-onset seizures from multiple locations, most prominent in the left posterior temporal/posterocentral region  - RPT EEG (10/6) with RUE twitching are likely non-epileptic in nature, however risk of focal-onset seizures from multiple locations  - Continue on Keppra PPX     # Metabolic vs Uremic Encephalopathy   - Lethargy noted with progression to stupor thought to be second to uremia.   - RPT CTH (9/26) with vague areas of hypoattenuation within the bilateral cerebellar hemispheres which may be compatible with acute/subacute ischemia.   - Discussed CTH with neurology and no signs of new infarct noted.   - HD reinstated in ICU with improvement in uremia however mentation remained   - Poor mentation likely in setting of toxic encephalopathy in setting of infections.     CARDIOVASCULAR  # HTN Urgency   # Septic vs vasoplegic shock   - Labile BPs ranging in between SBP 80s-200s and attempted labetalol, however noted with hypotension and bradycardia likely from BB toxicity vs shock state?    - Holding Labetalol, Catapres and Catapres.    - Attempted volume removal with bumex diuresis however noted with return of shock state requiring pressor support and transfer back to ICU.   - Pressors weaned off to stress dose (last day 10/4) and Midodrine   - Continue on Midodrine 30 TID (9/29 - ) and ensure timed 30-60 minutes prior to HD  - DO NOT HOLD MIDODRINE   - BP improved s/p albumin     # Hx of HFpEF with likely ADHF with volume overload.   - ECHO (7/2023) with EF 59 with severe LVH and diastolic dysfunction   - STEPHANIE (8/18) with normal LVRVSF however noted with increased LA pressures and persistent LA septal bowing into RA.   - ECHO (8/11) with EF 60 with mild LVH, normal LVRVSF, and mild ddfxn.  - Grossly volume overloaded and removing volume with HD midodrine assisted sessions     HEENT   # Left ear OE with acute on chronic left-sided otomastoiditis  - ENT evaluation (9/7) with no mastoid tenderness, however found with positive swelling and excoriation to left auricle and tenderness to manipulation of auricle. Debrided crusting of auricle and EAC evaluated with edema and unable to visualize TM. EAC debrided and found with watery exudate.   - CT IAC with acute on chronic left-sided otitis externa and acute on chronic left-sided otomastoiditis. Superimposed left-sided tympanosclerosis. Superimposed cholesteatoma formation within the left tympanomastoid cavity cannot be excluded  - Completed CiproHC (9/5 - 9/16), Bradford (9/1-10/1) and acetic acid and bacitracin.   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   - Continue on empiric valtrex 500mg BID (9/29 - 10/8) per ophtho and ID  - Continue preservative free artificial tears 4 times a day in the both eye  - Continue lacrilube ointment twice a day in the both eyes   - Ophthalmology reconsulted (10/12) given L-eye injection     # Oral Lesions with questionable Zoster vs Trauma?   - Patient with tongue pain and seen with anterior ulcerations consolidating and crusting and posterior ulceration.  - Case discussed with pathology resident and culture/ cytology sent.   - HSV negative and Path (9/6) with normal appearing epithelial cells singly and in clusters devoid of viral cytopathic effect.   - Continue on magic mouth wash for pain    RESPIRATORY  # AHHRF second to volume overload   - Hx of CKD and HFpEF presented with SOB and hypercarbia second to volume overload requiring intubation and HD initiation   - Vent weaning attempted however limited requiring tracheostomy (9/1) with IP   - Course complicated by PIPs elevated and found with tracheomalacia and volume overloaded.   - CT CHEST (9/8) with tracheomalacia, BL pleural effusions and continued consolidations   - Continue on vent and given TBM increased PEEP to 8 with improvement in dynamic collapse, but PIP waxes and wanes with volume overloaded and secretions.  - Continue on albuterol and chest PT  - Continue volume removal with HD   - Attempted HTS but noted with hemoptysis and held   - Hold Atrovent given thick secretions   - Hold IPV given high inspiratory pressures   - Vent changed to PS with improvement - VBG good   - Had inc wob /hypoxic poor volumes today placed back on volume control but pressures increasing pt required positioning to help with volumes - back on pressure control with better volumes /dec pressure   - Rpeat ABG done -ok      GI  # UGIB   - CGE x 1 episode on 8/24 and melena x 2 episodes on 8/31 likely residual blood.   - EGD (8/31) found with esophagitis and erosive gastropathy  - Continue on PPI BID through late October and then PPI QD     # Dysphagia   - NGT-TF continued   - Pending PEG however needs improvement prior to placement.     RENAL  # Progressive CKD   - Presented volume overload and progressive RUSS on CKD   - POCUS with no signs of hydronephrosis in ICU  - Pressor support started with improvement in renal function in ICU  - Attempted steroid course in ICU without improvement in renal function   - Case discussed at length with renal and initiated on HD in ICU   - Remain on HD while in RCU however noted to be volume overloaded. Attempted Bumex pushes and patient noted with good UOP.   - Attempted renal recovery in RCU however noted with decent UOP, but BUN/ CRE continued to rise without improvement on diuresis.   - Ultimately resumed on HD MWF TIW with midodrine assisted volume removal, however pressure remains soft with difficulty removing aggressive fluids.   - D/w Renal for additional UF session (10/12)    # Hyponatremia   - Continue on salt tabs 1G  (decreased 10/6) and weaning off as tolerated with volume removal   - Monitor sodium     # Hyperphosphatemia to Hypophosphatemia with HD  - PTH normal and elevated phos likely from renal failure  - Phos binder with Renvela started with improvement however then noted with hypophosphatemia and Renvela stopped.   - required replacment KPHOS today with good response - continue to monitor     INFECTIOUS DISEASE  # ESBL ECOLI and MSSA VAP c/b MSSA bacteremia   - RVP/ COVID (8/11) negative   - SCx (8/11) with MSSA s/p cefepime (8/12-8/13) and then ancef (8/14) however noted with drug reaction and then changed to vanco and aztreonam (8/12-8/13) in ICU .   - Course complicated by recurrent fevers and return of MSSA with bacteremia.   - SCx (9/1) with MSSA and ESBL ECOLI   - BAL (9/1) with MSSA   - BCx (9/1) with MSSA and cleared on (9/4)   - ECHO (9/6) unable to rule out endocarditis   - Continue on Vanco (9/4-9/22) however noted with rashes of uncertain etiology and replaced with Bradford (9/4 - 10/2) and DAPTO (9/26-10/2) for  completion.     # Proteus and  PSA VAP/ Trachitis   - Continued fevers and SCx (9/29) with MDR  PSA and Proteus   - Continued on Bradford as above however noted with resistance and changed to Zosyn (10/2 - 10/5) with course complicated by possible drug rxn. Zosyn changed to Aztreonam (10/5 - 10/6) however RPT SCx (10/3) with  PSA however only intermediate coverage to aztreonam and amikacin.  PSA grossly resistant to other antibiotics other than cephalosporins however patient with reactions in the past. Case discussed with ID and ID pharmacist and change to Polymyxin (10/6 - ) however continues to spike fevers likely second to Polymyxin only intermediate coverage and Recarbio resistant .   - Overall difficulty with ABX tailoring given allergic rxns and resistant organisms.   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  - Monitor HH     VASCULAR   # LLE POP DVT   - US BL LE (9/10) with acute left deep vein thrombosis of the left popliteal vein.  - RUE swollen and VA DOPPLER RUE (10/3) with R IJ DVT and R brachial DVT.  - Eliquis held for permacath however procedure held.   - Eliquis remains on hold second to mild suction trauma hemoptysis.   - Resumed Eliquis     # HD access  - L IJ CLAUDIA (9/27 - )   - Planned for IR permacath cancelled for today and will reattempt when infectious status improves.     ONC   # Hx of Breast CA   - Patient dx in 2018 and s/p BL mastectomy (radical on right) and chemo and RT.   - Supportive care.     ENDOCRINE  # IDDM2   - Continued on NPH with ISS, however noted with hypoglycemic episodes and NPH dc'ed.    - Finger sticks trending up off NPH.   - Continue on NPH 6U with moderate ISS.   - Adjust as need     SKIN  # Drug Eruption   - Attempted cefepime (8/12) vs ancef (8/13-8/14) and then noted with drug rxn in ICU. Continue on steroids with prednisone 40 (8/18 - 9/2) then prednisone 30 (9/3-9/7), then prednisone 20 (9/8 - 9/10), then prednisone 10mg (9/11-9/13) then turn off.   - Attempted Zosyn (10/2-10/5) with possible rash. Zosyn turned off with improvement.   - Hold further cephalosporins or PCNs at this time   - Monitor for further drug rxns.     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 ESRD s/p ICU and intubation. Course further c/b GIB s/p EGD, new R cerebellar infarct, and pneumonia/bacteremia, and prolonged respiratory failure require tracheostomy.       #NEURO  Previous episodes of possible seizure, EEG with possible focal seizure with RUE twitching and at risk of focal onset seizures from multiple locations, especially left posterior temporal/posterocentral region, likely due to encephalomalacia/gliosis from prior left parietal CVA. Also with underlying encephalopathy due to acute illnes + RUSS + sepsis + history of CVA's (MICU course complicated by new R cerebellar, midbrain, and multiple tiny embolic infarcts as well as known left PCA CVA). Repeat CT head on 10/3 with no acute changes. Ammonia normal. BUN improved. Continue vEEG monitoring.  -  on levetiracetam.   - no evidence of seizure today, remains unresponsive, when stable will send for MRI  - dc asa while on apixaban    #Renal  -HD with fluid removal per renal. Hold HD if hypotensive, midodrine to be given prior - will discuss with nephrology, may benefit from extra HD days for additional fluid removal   - continues to be grossly fluid overloaded on exam   -  Anemia of CKD, continue Epoetin per nephrology. Hb slowly decreasing, likely transfuse 1 u pRBC today   - tolerating HD today     #ID  - Repeat sputum culture with numerous carbapenem-resistant Pseudomonas aeruginosa. However, she is allergic to PCNs and cephalosporins. Appreciate ID follow-up, currently on Polymyxin B. Appreciate ENT follow-up for left ear otitis externa, which has now resolved. Follow-up ophthalmology regarding keratitis + chemosis, on Lacrilube and artificial tears.   - ongoing sepsis with no better antibiotic options given allergies and resistance profile  - appreciate ID input    #CVS  - hypotensive, Continue midodrine 30 mg q8h.  -  Continue aspirin and statin given h/o CVA  - On apixaban for DVT.      #Pulm   - Continue lung protective ventilation. On 10/11 Very high peak pressures on VC - PIP 65 on ,   RR 22, FiO2 35 - inadequate volumes, repositoined as diaphragmatic compression worsened high PIP - when lowered legs, head of bed improved PIP. Switched back to PC 45/8, with volumes of ~310-320 cc.    - post fluid removal by HD pressures are improved, titrated down PC top 40/8 with volumes ~320 cc, continue to adjust as tolerated - most recent gas (labeled ABG, likely VBG) with worsening respiratory acidosis - will repeat and obtain true ABG, increase RR - continue to monitor TV, if decreases will increase Pressure to 45/8  - Airway clearance therapy with albuterol  - Bloody secretions noted upon suction on 10/9- FiO2 requirements unchanged. Will discontinue hypertonic saline, continue albuterol. Monitor closely, if develops antonina hemoptysis start TXA nebs 500 mg q8h standing for 72 hrs  - no further hemoptysis today   - ABG with increased hypcapnea and RR increased from 22->24 with improvement (10/13)    #GI  tube feeds as tolerates. PPI for GI ppx.  - check LFTs    #GOALS OF CARE   Goals of care meeting with daughter and , BETTINA Candelario and Nurse Manager present on 10/9/ We discussed Ms. Barraza's current clinical condition detailing management above. All questions from family answered. I informed family that despite all our efforts she is extremely ill and at risk for further deterioration. They understand and wish to pursue all possible medical interventions. She will remain full code.

## 2023-10-13 NOTE — PROGRESS NOTE ADULT - SUBJECTIVE AND OBJECTIVE BOX
Patient is a 73y old  Female who presents with a chief complaint of Respiratory distress (13 Oct 2023 17:42)      SUBJECTIVE / OVERNIGHT EVENTS: HD MWF, PUF in between HD days, unable to remove proper volume 2/2 hypotension. AS PER ID Polymyxin course to have been completed 10/12.     MEDICATIONS  (STANDING):  albuterol    0.083% 2.5 milliGRAM(s) Nebulizer every 6 hours  apixaban 5 milliGRAM(s) Oral every 12 hours  artificial tears (preservative free) Ophthalmic Solution 1 Drop(s) Both EYES every 4 hours  atorvastatin 10 milliGRAM(s) Oral at bedtime  chlorhexidine 0.12% Liquid 15 milliLiter(s) Oral Mucosa every 12 hours  chlorhexidine 2% Cloths 1 Application(s) Topical daily  dextrose 5%. 1000 milliLiter(s) (100 mL/Hr) IV Continuous <Continuous>  dextrose 5%. 1000 milliLiter(s) (50 mL/Hr) IV Continuous <Continuous>  dextrose 50% Injectable 12.5 Gram(s) IV Push once  dextrose 50% Injectable 25 Gram(s) IV Push once  dextrose 50% Injectable 25 Gram(s) IV Push once  dextrose 50% Injectable 25 Gram(s) IV Push once  epoetin odalis (EPOGEN) Injectable 99868 Unit(s) IV Push <User Schedule>  ferrous    sulfate Liquid 300 milliGRAM(s) Oral daily  glucagon  Injectable 1 milliGRAM(s) IntraMuscular once  insulin lispro (ADMELOG) corrective regimen sliding scale   SubCutaneous every 6 hours  insulin NPH human recombinant 6 Unit(s) SubCutaneous every 6 hours  levETIRAcetam  Solution 1000 milliGRAM(s) Oral daily  levETIRAcetam  Solution 250 milliGRAM(s) Oral <User Schedule>  midodrine 10 milliGRAM(s) Oral once  midodrine. 30 milliGRAM(s) Oral every 8 hours  pantoprazole  Injectable 40 milliGRAM(s) IV Push two times a day  petrolatum Ophthalmic Ointment 1 Application(s) Both EYES four times a day  polymyxin B IVPB 584302 Unit(s) IV Intermittent every 12 hours  sodium chloride 1 Gram(s) Oral two times a day    MEDICATIONS  (PRN):  acetaminophen   Oral Liquid .. 650 milliGRAM(s) Oral every 6 hours PRN Temp greater or equal to 38C (100.4F), Mild Pain (1 - 3)  calamine/zinc oxide Lotion 1 Application(s) Topical two times a day PRN Rash and/or Itching  dextrose Oral Gel 15 Gram(s) Oral once PRN Blood Glucose LESS THAN 70 milliGRAM(s)/deciliter  sodium chloride 0.9% lock flush 10 milliLiter(s) IV Push every 1 hour PRN Pre/post blood products, medications, blood draw, and to maintain line patency      Vital Signs Last 24 Hrs  T(F): 98.6 (10-13-23 @ 17:09), Max: 99.3 (10-12-23 @ 20:00)  HR: 116 (10-13-23 @ 19:15) (106 - 121)  BP: 140/57 (10-13-23 @ 17:09) (100/55 - 140/57)  RR: 22 (10-13-23 @ 17:09) (18 - 23)  SpO2: 100% (10-13-23 @ 19:15) (95% - 100%)  Telemetry:   CAPILLARY BLOOD GLUCOSE      POCT Blood Glucose.: 159 mg/dL (13 Oct 2023 17:02)  POCT Blood Glucose.: 194 mg/dL (13 Oct 2023 11:10)  POCT Blood Glucose.: 234 mg/dL (13 Oct 2023 05:22)  POCT Blood Glucose.: 166 mg/dL (12 Oct 2023 22:20)    I&O's Summary    12 Oct 2023 07:01  -  13 Oct 2023 07:00  --------------------------------------------------------  IN: 1260 mL / OUT: 1600 mL / NET: -340 mL    13 Oct 2023 07:01  -  13 Oct 2023 19:25  --------------------------------------------------------  IN: 400 mL / OUT: 2400 mL / NET: -2000 mL        PHYSICAL EXAM:  GENERAL: NAD, well-developed  HEAD:  Atraumatic, Normocephalic  EYES: EOMI, PERRLA, conjunctiva and sclera clear  NECK: Supple, No JVD  CHEST/LUNG: Clear to auscultation bilaterally; No wheeze  HEART: Regular rate and rhythm; No murmurs, rubs, or gallops  ABDOMEN: Soft, Nontender, Nondistended; Bowel sounds present  EXTREMITIES:  2+ Peripheral Pulses, No clubbing, cyanosis, or edema  PSYCH: AAOx3  NEUROLOGY: non-focal  SKIN: No rashes or lesions    LABS:                        7.6    13.95 )-----------( 382      ( 13 Oct 2023 17:00 )             24.3     10-13    131<L>  |  95<L>  |  36<H>  ----------------------------<  182<H>  4.9   |  26  |  1.69<H>    Ca    8.3<L>      13 Oct 2023 13:50  Phos  3.4     10-13  Mg     2.10     10-13            Urinalysis Basic - ( 13 Oct 2023 13:50 )    Color: x / Appearance: x / SG: x / pH: x  Gluc: 182 mg/dL / Ketone: x  / Bili: x / Urobili: x   Blood: x / Protein: x / Nitrite: x   Leuk Esterase: x / RBC: x / WBC x   Sq Epi: x / Non Sq Epi: x / Bacteria: x        RADIOLOGY & ADDITIONAL TESTS:    Imaging Personally Reviewed:    Consultant(s) Notes Reviewed:      Care Discussed with Consultants/Other Providers:

## 2023-10-13 NOTE — PROGRESS NOTE ADULT - SUBJECTIVE AND OBJECTIVE BOX
S: No overnight events. Pt seen and examined.      Medications: MEDICATIONS  (STANDING):  albuterol    0.083% 2.5 milliGRAM(s) Nebulizer every 6 hours  apixaban 5 milliGRAM(s) Oral every 12 hours  artificial tears (preservative free) Ophthalmic Solution 1 Drop(s) Both EYES every 4 hours  atorvastatin 10 milliGRAM(s) Oral at bedtime  chlorhexidine 0.12% Liquid 15 milliLiter(s) Oral Mucosa every 12 hours  chlorhexidine 2% Cloths 1 Application(s) Topical daily  dextrose 5%. 1000 milliLiter(s) (50 mL/Hr) IV Continuous <Continuous>  dextrose 5%. 1000 milliLiter(s) (100 mL/Hr) IV Continuous <Continuous>  dextrose 50% Injectable 25 Gram(s) IV Push once  dextrose 50% Injectable 25 Gram(s) IV Push once  dextrose 50% Injectable 25 Gram(s) IV Push once  dextrose 50% Injectable 12.5 Gram(s) IV Push once  epoetin odalis (EPOGEN) Injectable 89127 Unit(s) IV Push <User Schedule>  ferrous    sulfate Liquid 300 milliGRAM(s) Oral daily  glucagon  Injectable 1 milliGRAM(s) IntraMuscular once  insulin lispro (ADMELOG) corrective regimen sliding scale   SubCutaneous every 6 hours  insulin NPH human recombinant 6 Unit(s) SubCutaneous every 6 hours  levETIRAcetam  Solution 1000 milliGRAM(s) Oral daily  levETIRAcetam  Solution 250 milliGRAM(s) Oral <User Schedule>  midodrine. 30 milliGRAM(s) Oral every 8 hours  pantoprazole  Injectable 40 milliGRAM(s) IV Push two times a day  petrolatum Ophthalmic Ointment 1 Application(s) Both EYES two times a day  polymyxin B IVPB 329851 Unit(s) IV Intermittent every 12 hours  sodium chloride 1 Gram(s) Oral two times a day    MEDICATIONS  (PRN):  acetaminophen   Oral Liquid .. 650 milliGRAM(s) Oral every 6 hours PRN Temp greater or equal to 38C (100.4F), Mild Pain (1 - 3)  calamine/zinc oxide Lotion 1 Application(s) Topical two times a day PRN Rash and/or Itching  dextrose Oral Gel 15 Gram(s) Oral once PRN Blood Glucose LESS THAN 70 milliGRAM(s)/deciliter  sodium chloride 0.9% lock flush 10 milliLiter(s) IV Push every 1 hour PRN Pre/post blood products, medications, blood draw, and to maintain line patency       Vitals:  Vital Signs Last 24 Hrs  T(C): 37.2 (13 Oct 2023 04:00), Max: 37.6 (12 Oct 2023 14:04)  T(F): 99 (13 Oct 2023 04:00), Max: 99.7 (12 Oct 2023 14:04)  HR: 113 (13 Oct 2023 07:59) (106 - 119)  BP: 119/55 (13 Oct 2023 04:00) (97/37 - 123/53)  BP(mean): 74 (13 Oct 2023 04:00) (53 - 88)  RR: 18 (13 Oct 2023 04:00) (18 - 22)  SpO2: 100% (13 Oct 2023 07:59) (95% - 100%)    Parameters below as of 13 Oct 2023 04:00  Patient On (Oxygen Delivery Method): ventilator    O2 Concentration (%): 60        Neurological Exam:  Mental Status: Eyes closed, off sedation. Does not follow commands,  + trach to vent and NGT   Cranial Nerves: PERRL slightly sluggish, L subconjunctival hemorrhage  Motor: Moves upper extremities spontaneously, tremor R > L  no movement noted in lowers  Sensation: WD to noxious x4    I personally reviewed the below data/images/labs:       LABS:                          7.5    13.54 )-----------( 321      ( 12 Oct 2023 06:29 )             23.8     10-12    131<L>  |  96<L>  |  28<H>  ----------------------------<  179<H>  4.2   |  27  |  1.35<H>    Ca    8.1<L>      12 Oct 2023 06:29  Phos  2.5     10-12  Mg     1.90     10-12          Urinalysis Basic - ( 12 Oct 2023 06:29 )    Color: x / Appearance: x / SG: x / pH: x  Gluc: 179 mg/dL / Ketone: x  / Bili: x / Urobili: x   Blood: x / Protein: x / Nitrite: x   Leuk Esterase: x / RBC: x / WBC x   Sq Epi: x / Non Sq Epi: x / Bacteria: x              < from: CT Head No Cont (08.15.23 @ 17:20) >    ACC: [id number] EXAM:  CT BRAIN   ORDERED BY: MINAL SAM     PROCEDURE DATE:  08/15/2023          INTERPRETATION:  Clinical indication: Change in neuro exam.    Multiple axial sections were performed from base to vertex without   contrast enhancement. Coronal and sagittal reconstructions were   reformatted well.    This exam is compared prior head CT performed on August 11, 2023    Parenchymal volume loss and chronic microvessel ischemic changes are   again seen.    Abnormal low-attenuation involving the left occipital cortical   subcortical region is again seen. 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    No evidence of acute hemorrhage mass or mass effect is seen.    Evaluation of the osseous structures with appropriate window demonstrates   sclerotic changes about the left mastoid region which appears stable.   Opacification left middle ear region is again seen.    Patient is status post bilateral cataract surgery.    Impression: Stable exam.    < end of copied text >    vEEG:    Clinical Impression:  - Severe diffuse non-specific cerebral dysfunction  - There were no epileptiform abnormalities or seizures recorded.        CTH 8/11:    VENTRICLES AND SULCI: Age-appropriate involutional change  INTRA-AXIAL:  Old left PCA infarct as seen on the prior unchanged.   Microvascular ischemic changes involving the periventricular and   subcortical white matter as seen previously  EXTRA-AXIAL:  No mass or collection is seen.  VISUALIZED SINUSES:  Clear.  VISUALIZED MASTOIDS: Left mastoid sclerosis  CALVARIUM: Infiltrative appearance tothe calvarium may be indicative of   marrow infiltration on the basis of patient's known diagnosis of breast   cancer. MISCELLANEOUS:  None.    IMPRESSION:  No significant interval change compared with 7/17/2023 in   left PCA infarct which is old. Microvascular ischemic changes involving   the periventricular and subcortical white matter as seen   previously.Questionable lesions at the level of the calvarium related to   possible breast CA. Clinical correlation recommended.    --- End of Report ---      ct< from: CT Head No Cont (09.26.23 @ 21:02) >  IMPRESSION: Vague questionable areas of hypoattenuation within the   bilateral cerebellar hemispheres which may be compatible with   acute/subacute ischemia. Recommend further evaluation with a brain MRI   study, provided there are no MRI contraindications.    No acute intracranial hemorrhage.    Previously seen questionable vague wedge-shaped area of hypoattenuation   in the left parietal lobe with artifactual secondary to motion and volume   averaging with a prominent sulcus.    Chronic left occipital lobe infarct.    < end of copied text >    < from: CT Head No Cont (10.03.23 @ 20:46) >    IMPRESSION:    No acute intracranial hemorrhage or mass effect. Evaluation of the   posterior fossa degraded by streak artifact from dental amalgam.   Lucencies in the bilateral cerebellum may be artifactual.    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    Bilateral middle ear and mastoid effusions which are also seen on prior   exam.    EEG Classification / Summary:  Abnormal EEG in the awake, drowsy states.   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   -No seizures   S: Had desaturation overnight, trach adjusted.  Pt seen and examined.      Medications: MEDICATIONS  (STANDING):  albuterol    0.083% 2.5 milliGRAM(s) Nebulizer every 6 hours  apixaban 5 milliGRAM(s) Oral every 12 hours  artificial tears (preservative free) Ophthalmic Solution 1 Drop(s) Both EYES every 4 hours  atorvastatin 10 milliGRAM(s) Oral at bedtime  chlorhexidine 0.12% Liquid 15 milliLiter(s) Oral Mucosa every 12 hours  chlorhexidine 2% Cloths 1 Application(s) Topical daily  dextrose 5%. 1000 milliLiter(s) (50 mL/Hr) IV Continuous <Continuous>  dextrose 5%. 1000 milliLiter(s) (100 mL/Hr) IV Continuous <Continuous>  dextrose 50% Injectable 25 Gram(s) IV Push once  dextrose 50% Injectable 25 Gram(s) IV Push once  dextrose 50% Injectable 25 Gram(s) IV Push once  dextrose 50% Injectable 12.5 Gram(s) IV Push once  epoetin odalis (EPOGEN) Injectable 70841 Unit(s) IV Push <User Schedule>  ferrous    sulfate Liquid 300 milliGRAM(s) Oral daily  glucagon  Injectable 1 milliGRAM(s) IntraMuscular once  insulin lispro (ADMELOG) corrective regimen sliding scale   SubCutaneous every 6 hours  insulin NPH human recombinant 6 Unit(s) SubCutaneous every 6 hours  levETIRAcetam  Solution 1000 milliGRAM(s) Oral daily  levETIRAcetam  Solution 250 milliGRAM(s) Oral <User Schedule>  midodrine. 30 milliGRAM(s) Oral every 8 hours  pantoprazole  Injectable 40 milliGRAM(s) IV Push two times a day  petrolatum Ophthalmic Ointment 1 Application(s) Both EYES two times a day  polymyxin B IVPB [id number] Unit(s) IV Intermittent every 12 hours  sodium chloride 1 Gram(s) Oral two times a day    MEDICATIONS  (PRN):  acetaminophen   Oral Liquid .. 650 milliGRAM(s) Oral every 6 hours PRN Temp greater or equal to 38C (100.4F), Mild Pain (1 - 3)  calamine/zinc oxide Lotion 1 Application(s) Topical two times a day PRN Rash and/or Itching  dextrose Oral Gel 15 Gram(s) Oral once PRN Blood Glucose LESS THAN 70 milliGRAM(s)/deciliter  sodium chloride 0.9% lock flush 10 milliLiter(s) IV Push every 1 hour PRN Pre/post blood products, medications, blood draw, and to maintain line patency       Vitals:  Vital Signs Last 24 Hrs  T(C): 37.2 (13 Oct 2023 04:00), Max: 37.6 (12 Oct 2023 14:04)  T(F): 99 (13 Oct 2023 04:00), Max: 99.7 (12 Oct 2023 14:04)  HR: 113 (13 Oct 2023 07:59) (106 - 119)  BP: 119/55 (13 Oct 2023 04:00) (97/37 - 123/53)  BP(mean): 74 (13 Oct 2023 04:00) (53 - 88)  RR: 18 (13 Oct 2023 04:00) (18 - 22)  SpO2: 100% (13 Oct 2023 07:59) (95% - 100%)    Parameters below as of 13 Oct 2023 04:00  Patient On (Oxygen Delivery Method): ventilator    O2 Concentration (%): 60        Neurological Exam:  Mental Status: Eyes closed, off sedation. Does not follow commands,  + trach to vent and NGT   Cranial Nerves: PERRL slightly sluggish, L subconjunctival hemorrhage  Motor: Moves upper extremities spontaneously, tremor R > L  no movement noted in lowers  Sensation: WD to noxious x4    I personally reviewed the below data/images/labs:       LABS:                          7.5    13.54 )-----------( 321      ( 12 Oct 2023 06:29 )             23.8     10-12    131<L>  |  96<L>  |  28<H>  ----------------------------<  179<H>  4.2   |  27  |  1.35<H>    Ca    8.1<L>      12 Oct 2023 06:29  Phos  2.5     10-12  Mg     1.90     10-12          Urinalysis Basic - ( 12 Oct 2023 06:29 )    Color: x / Appearance: x / SG: x / pH: x  Gluc: 179 mg/dL / Ketone: x  / Bili: x / Urobili: x   Blood: x / Protein: x / Nitrite: x   Leuk Esterase: x / RBC: x / WBC x   Sq Epi: x / Non Sq Epi: x / Bacteria: x              < from: CT Head No Cont (08.15.23 @ 17:20) >    ACC: [id number] EXAM:  CT BRAIN   ORDERED BY: MINAL SAM     PROCEDURE DATE:  08/15/2023          INTERPRETATION:  Clinical indication: Change in neuro exam.    Multiple axial sections were performed from base to vertex without   contrast enhancement. Coronal and sagittal reconstructions were   reformatted well.    This exam is compared prior head CT performed on August 11, 2023    Parenchymal volume loss and chronic microvessel ischemic changes are   again seen.    Abnormal low-attenuation involving the left occipital cortical   subcortical region is again seen. 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    No evidence of acute hemorrhage mass or mass effect is seen.    Evaluation of the osseous structures with appropriate window demonstrates   sclerotic changes about the left mastoid region which appears stable.   Opacification left middle ear region is again seen.    Patient is status post bilateral cataract surgery.    Impression: Stable exam.    < end of copied text >    vEEG:    Clinical Impression:  - Severe diffuse non-specific cerebral dysfunction  - There were no epileptiform abnormalities or seizures recorded.        CTH 8/11:    VENTRICLES AND SULCI: Age-appropriate involutional change  INTRA-AXIAL:  Old left PCA infarct as seen on the prior unchanged.   Microvascular ischemic changes involving the periventricular and   subcortical white matter as seen previously  EXTRA-AXIAL:  No mass or collection is seen.  VISUALIZED SINUSES:  Clear.  VISUALIZED MASTOIDS: Left mastoid sclerosis  CALVARIUM: Infiltrative appearance tothe calvarium may be indicative of   marrow infiltration on the basis of patient's known diagnosis of breast   cancer. MISCELLANEOUS:  None.    IMPRESSION:  No significant interval change compared with 7/17/2023 in   left PCA infarct which is old. Microvascular ischemic changes involving   the periventricular and subcortical white matter as seen   previously.Questionable lesions at the level of the calvarium related to   possible breast CA. Clinical correlation recommended.    --- End of Report ---      ct< from: CT Head No Cont (09.26.23 @ 21:02) >  IMPRESSION: Vague questionable areas of hypoattenuation within the   bilateral cerebellar hemispheres which may be compatible with   acute/subacute ischemia. Recommend further evaluation with a brain MRI   study, provided there are no MRI contraindications.    No acute intracranial hemorrhage.    Previously seen questionable vague wedge-shaped area of hypoattenuation   in the left parietal lobe with artifactual secondary to motion and volume   averaging with a prominent sulcus.    Chronic left occipital lobe infarct.    < end of copied text >    < from: CT Head No Cont (10.03.23 @ 20:46) >    IMPRESSION:    No acute intracranial hemorrhage or mass effect. Evaluation of the   posterior fossa degraded by streak artifact from dental amalgam.   Lucencies in the bilateral cerebellum may be artifactual.    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    Bilateral middle ear and mastoid effusions which are also seen on prior   exam.    EEG Classification / Summary:  Abnormal EEG in the awake, drowsy states.   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   -No seizures

## 2023-10-13 NOTE — PROGRESS NOTE ADULT - SUBJECTIVE AND OBJECTIVE BOX
[facility] DIVISION OF PULMONARY, CRITICAL CARE and SLEEP MEDICINE  PULMONARY PROGRESS NOTE  OFFICE NUMBER: [phone]    PATIENT INFORMATION:  NAME: MILANA BALL:  MRN: [id number]    CHIEF COMPLAINT: Patient is a 73y old  Female who presents with a chief complaint of Respiratory distress (13 Oct 2023 12:45)      [x] INITIAL CONSULT, H&P, FAMILY HISTORY and PAST MEDICAL AND SURGICAL HISTORY REVIEWED         ========================REVIEW OF SYSTEMS========================  CONSTITUTIONAL:  CARDIOVASCULAR:  PULMONARY:  [] REMAINING REVIEW OF SYSTEMS NEGATIVE  [x] UNABLE TO OBTAIN REVIEW OF SYSTEMS DUE TO AMS/encephalopathy    ========================MEDICATIONS=============================  MEDICATIONS  (STANDING):  albuterol    0.083% 2.5 milliGRAM(s) Nebulizer every 6 hours  apixaban 5 milliGRAM(s) Oral every 12 hours  artificial tears (preservative free) Ophthalmic Solution 1 Drop(s) Both EYES every 4 hours  atorvastatin 10 milliGRAM(s) Oral at bedtime  chlorhexidine 0.12% Liquid 15 milliLiter(s) Oral Mucosa every 12 hours  chlorhexidine 2% Cloths 1 Application(s) Topical daily  dextrose 5%. 1000 milliLiter(s) (50 mL/Hr) IV Continuous <Continuous>  dextrose 5%. 1000 milliLiter(s) (100 mL/Hr) IV Continuous <Continuous>  dextrose 50% Injectable 12.5 Gram(s) IV Push once  dextrose 50% Injectable 25 Gram(s) IV Push once  dextrose 50% Injectable 25 Gram(s) IV Push once  dextrose 50% Injectable 25 Gram(s) IV Push once  epoetin odalis (EPOGEN) Injectable 21482 Unit(s) IV Push <User Schedule>  ferrous    sulfate Liquid 300 milliGRAM(s) Oral daily  glucagon  Injectable 1 milliGRAM(s) IntraMuscular once  insulin lispro (ADMELOG) corrective regimen sliding scale   SubCutaneous every 6 hours  insulin NPH human recombinant 6 Unit(s) SubCutaneous every 6 hours  levETIRAcetam  Solution 1000 milliGRAM(s) Oral daily  levETIRAcetam  Solution 250 milliGRAM(s) Oral <User Schedule>  midodrine 10 milliGRAM(s) Oral once  midodrine. 30 milliGRAM(s) Oral every 8 hours  pantoprazole  Injectable 40 milliGRAM(s) IV Push two times a day  petrolatum Ophthalmic Ointment 1 Application(s) Both EYES four times a day  polymyxin B IVPB 442057 Unit(s) IV Intermittent every 12 hours  sodium chloride 1 Gram(s) Oral two times a day      MEDICATIONS  (PRN):  acetaminophen   Oral Liquid .. 650 milliGRAM(s) Oral every 6 hours PRN Temp greater or equal to 38C (100.4F), Mild Pain (1 - 3)  calamine/zinc oxide Lotion 1 Application(s) Topical two times a day PRN Rash and/or Itching  dextrose Oral Gel 15 Gram(s) Oral once PRN Blood Glucose LESS THAN 70 milliGRAM(s)/deciliter  sodium chloride 0.9% lock flush 10 milliLiter(s) IV Push every 1 hour PRN Pre/post blood products, medications, blood draw, and to maintain line patency      ========================PHYSICAL EXAM============================    VITALS: ICU Vital Signs Last 24 Hrs  T(C): 37 (13 Oct 2023 17:09), Max: 37.4 (12 Oct 2023 20:00)  T(F): 98.6 (13 Oct 2023 17:09), Max: 99.3 (12 Oct 2023 20:00)  HR: 121 (13 Oct 2023 17:09) (106 - 121)  BP: 140/57 (13 Oct 2023 17:09) (100/55 - 140/57)  BP(mean): 79 (13 Oct 2023 17:09) (70 - 80)  ABP: --  ABP(mean): --  RR: 22 (13 Oct 2023 17:09) (18 - 23)  SpO2: 100% (13 Oct 2023 17:09) (95% - 100%)    O2 Parameters below as of 13 Oct 2023 17:09  Patient On (Oxygen Delivery Method): ventilator, Pressure AC    O2 Concentration (%): 35        INTAKE and OUTPUT: I&O's Summary    12 Oct 2023 07:01  -  13 Oct 2023 07:00  --------------------------------------------------------  IN: 1260 mL / OUT: 1600 mL / NET: -340 mL    13 Oct 2023 07:01  -  13 Oct 2023 17:42  --------------------------------------------------------  IN: 400 mL / OUT: 2400 mL / NET: -2000 mL         Exam:   unresponsive  trach on ventilator  lungs with coarse breath sounds bilaterally   + anasarca  distended abdomen, no rebound or guarding, soft  extremities warm     ========================LABORATORY RESULTS AND IMAGING=============                        7.0    12.22 )-----------( 344      ( 13 Oct 2023 13:50 )             22.4                                                    10-13    131<L>  |  95<L>  |  36<H>  ----------------------------<  182<H>  4.9   |  26  |  1.69<H>    Ca    8.3<L>      13 Oct 2023 13:50  Phos  3.4     10-13  Mg     2.10     10-13        ABG - ( 13 Oct 2023 13:50 )  pH, Arterial: 7.27  pH, Blood: x     /  pCO2: 63    /  pO2: 46    / HCO3: 29    / Base Excess: 0.5   /  SaO2: 78.3                Creatinine Trend: 1.69<--, 1.35<--, 1.77<--, 1.51<--, 1.76<--, 1.48<--      [facility] DIVISION OF PULMONARY, CRITICAL CARE and SLEEP MEDICINE  PULMONARY PROGRESS NOTE  OFFICE NUMBER: [phone]    PATIENT INFORMATION:  NAME: MILANA BALL:  MRN: [id number]    CHIEF COMPLAINT: Patient is a 73y old  Female who presents with a chief complaint of Respiratory distress (13 Oct 2023 12:45)      [x] INITIAL CONSULT, H&P, FAMILY HISTORY and PAST MEDICAL AND SURGICAL HISTORY REVIEWED         ========================REVIEW OF SYSTEMS========================  CONSTITUTIONAL:  CARDIOVASCULAR:  PULMONARY:  [] REMAINING REVIEW OF SYSTEMS NEGATIVE  [x] UNABLE TO OBTAIN REVIEW OF SYSTEMS DUE TO AMS/encephalopathy    ========================MEDICATIONS=============================  MEDICATIONS  (STANDING):  albuterol    0.083% 2.5 milliGRAM(s) Nebulizer every 6 hours  apixaban 5 milliGRAM(s) Oral every 12 hours  artificial tears (preservative free) Ophthalmic Solution 1 Drop(s) Both EYES every 4 hours  atorvastatin 10 milliGRAM(s) Oral at bedtime  chlorhexidine 0.12% Liquid 15 milliLiter(s) Oral Mucosa every 12 hours  chlorhexidine 2% Cloths 1 Application(s) Topical daily  dextrose 5%. 1000 milliLiter(s) (50 mL/Hr) IV Continuous <Continuous>  dextrose 5%. 1000 milliLiter(s) (100 mL/Hr) IV Continuous <Continuous>  dextrose 50% Injectable 12.5 Gram(s) IV Push once  dextrose 50% Injectable 25 Gram(s) IV Push once  dextrose 50% Injectable 25 Gram(s) IV Push once  dextrose 50% Injectable 25 Gram(s) IV Push once  epoetin odalis (EPOGEN) Injectable 17408 Unit(s) IV Push <User Schedule>  ferrous    sulfate Liquid 300 milliGRAM(s) Oral daily  glucagon  Injectable 1 milliGRAM(s) IntraMuscular once  insulin lispro (ADMELOG) corrective regimen sliding scale   SubCutaneous every 6 hours  insulin NPH human recombinant 6 Unit(s) SubCutaneous every 6 hours  levETIRAcetam  Solution 1000 milliGRAM(s) Oral daily  levETIRAcetam  Solution 250 milliGRAM(s) Oral <User Schedule>  midodrine 10 milliGRAM(s) Oral once  midodrine. 30 milliGRAM(s) Oral every 8 hours  pantoprazole  Injectable 40 milliGRAM(s) IV Push two times a day  petrolatum Ophthalmic Ointment 1 Application(s) Both EYES four times a day  polymyxin B IVPB 963001 Unit(s) IV Intermittent every 12 hours  sodium chloride 1 Gram(s) Oral two times a day      MEDICATIONS  (PRN):  acetaminophen   Oral Liquid .. 650 milliGRAM(s) Oral every 6 hours PRN Temp greater or equal to 38C (100.4F), Mild Pain (1 - 3)  calamine/zinc oxide Lotion 1 Application(s) Topical two times a day PRN Rash and/or Itching  dextrose Oral Gel 15 Gram(s) Oral once PRN Blood Glucose LESS THAN 70 milliGRAM(s)/deciliter  sodium chloride 0.9% lock flush 10 milliLiter(s) IV Push every 1 hour PRN Pre/post blood products, medications, blood draw, and to maintain line patency      ========================PHYSICAL EXAM============================    VITALS: ICU Vital Signs Last 24 Hrs  T(C): 37 (13 Oct 2023 17:09), Max: 37.4 (12 Oct 2023 20:00)  T(F): 98.6 (13 Oct 2023 17:09), Max: 99.3 (12 Oct 2023 20:00)  HR: 121 (13 Oct 2023 17:09) (106 - 121)  BP: 140/57 (13 Oct 2023 17:09) (100/55 - 140/57)  BP(mean): 79 (13 Oct 2023 17:09) (70 - 80)  ABP: --  ABP(mean): --  RR: 22 (13 Oct 2023 17:09) (18 - 23)  SpO2: 100% (13 Oct 2023 17:09) (95% - 100%)    O2 Parameters below as of 13 Oct 2023 17:09  Patient On (Oxygen Delivery Method): ventilator, Pressure AC    O2 Concentration (%): 35        INTAKE and OUTPUT: I&O's Summary    12 Oct 2023 07:01  -  13 Oct 2023 07:00  --------------------------------------------------------  IN: 1260 mL / OUT: 1600 mL / NET: -340 mL    13 Oct 2023 07:01  -  13 Oct 2023 17:42  --------------------------------------------------------  IN: 400 mL / OUT: 2400 mL / NET: -2000 mL    PHYSICAL EXAM:  General: NAD   HEENT: (+)NGT in nare  Neck: (+) Trach tube noted, site c/d/i. (+)[facility]  Cards: S1/S2, no murmurs   Pulm: Mild b/l wheezes.   Abdomen: Soft, NTND. (+)Lower abd pitting edema   Extremities: (+)1-2 b/l LE edema.   Neurology: lethargic. Nonverbal. Not following commands. Minimal arousal with verbal or tactile stimuli   Skin: warm to touch, color appropriate for ethnicity. Refer to RN assessment for further details.      ========================LABORATORY RESULTS AND IMAGING=============                        7.0    12.22 )-----------( 344      ( 13 Oct 2023 13:50 )             22.4                                                    10-13    131<L>  |  95<L>  |  36<H>  ----------------------------<  182<H>  4.9   |  26  |  1.69<H>    Ca    8.3<L>      13 Oct 2023 13:50  Phos  3.4     10-13  Mg     2.10     10-13        ABG - ( 13 Oct 2023 13:50 )  pH, Arterial: 7.27  pH, Blood: x     /  pCO2: 63    /  pO2: 46    / HCO3: 29    / Base Excess: 0.5   /  SaO2: 78.3                Creatinine Trend: 1.69<--, 1.35<--, 1.77<--, 1.51<--, 1.76<--, 1.48<--

## 2023-10-13 NOTE — PROGRESS NOTE ADULT - ASSESSMENT
Assessment and Recommendations:  73y female with a past medical history/ocular history of DM, HTN, CKD, breast CA, respiratory arrest and cardiac arrest (2018), CVA with residual weakness, aspiration PNA h/o trache, PEG, Diabetic retinopathy, consulted for red left eye, now significantly improving.     #Chemosis OU  # POOJA OU  - Unable to test patient's visual acuity, color vision, EOM, due to current status  - Anterior exam with significantly improved chemosis and SPK OU, improved d-folds OS, and resolved subjeconj hemorrhage OS.  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      Outpatient Follow-up: Patient should follow-up with his/her ophthalmologist or with [facility] Department of Ophthalmology within 1 week of after discharge at:    [address]  [place] [address]  [phone]    Latricia France MD, PGY-3  Contact: [facility] [facility]

## 2023-10-13 NOTE — PROGRESS NOTE ADULT - ASSESSMENT
IMPRESSION: 73F w/ HTN, DM2, CVA, breast CA-bilateral mastectomy, recurrent aspiration pneumonia/respiratory failure, and CKD, 8/11/23 p/w acute hypercapnic respiratory failure; c/b RUSS    (1)Renal - RUSS on CKD4 ==>now newly ESRD. Last dialyzed Monday, due today. Potentially for tunneled cath with IR, timing TBD    (2)Hyponatremia - improved/stable as of late with HD    (3)Hypokalemia/Hypophosphatemia -  tube feeds changed to Glucerna,  improving as of late s/p repletion     (4)CV- tenuous hemodynamics such with each passing week we have more difficulty performing HD/achieving UF. Provided that we continue with aggressive life-sustaining therapy, at some point soon we will need to have her on pressor gtts to allow her to tolerate HD    (5)Pulm- vent-dependent    RECOMMEND:  (1)HD today - 3hrs, 2kg UF as able, pressors and sodium modelling as needed to keep SBP>90; Epogen with HD  (2)Tunneled cath placement if/when clinically optimized for the procedure  (3)Dose new meds for GFR <10/HD    Nilda Espinoza, HEAVEN  [facility]  [phone]      IMPRESSION: 73F w/ HTN, DM2, CVA, breast CA-bilateral mastectomy, recurrent aspiration pneumonia/respiratory failure, and CKD, 8/11/23 p/w acute hypercapnic respiratory failure; c/b RUSS    (1)Renal - RUSS on CKD4 ==>now newly ESRD. Last dialyzed Monday, due today. Potentially for tunneled cath with IR, timing TBD    (2)Hyponatremia - improved/stable as of late with HD    (3)Hypokalemia/Hypophosphatemia -  tube feeds changed to Glucerna,  improving as of late s/p repletion     (4)CV- tenuous hemodynamics such with each passing week we have more difficulty performing HD/achieving UF. Provided that we continue with aggressive life-sustaining therapy, at some point soon we will need to have her on pressor gtts to allow her to tolerate HD    (5)Pulm- vent-dependent    RECOMMEND:  (1)HD today - 3hrs, 2kg UF as able, pressors and sodium modelling as needed to keep SBP>90; Epogen with HD  (2)Will attempt PUF tomorrow - pressors as needed to keep SBP> 90  (3)Tunneled cath placement if/when clinically optimized for the procedure  (4)Dose new meds for GFR <10/HD    D/w Dr. Lolis Espinoza, [facility]  [phone]

## 2023-10-13 NOTE — PROGRESS NOTE ADULT - SUBJECTIVE AND OBJECTIVE BOX
NEPHROLOGY     Patient seen and examined with family at bedside, pt trach to vent, s/p PUF yesterday and removed 1.2L.     MEDICATIONS  (STANDING):  albuterol    0.083% 2.5 milliGRAM(s) Nebulizer every 6 hours  apixaban 5 milliGRAM(s) Oral every 12 hours  artificial tears (preservative free) Ophthalmic Solution 1 Drop(s) Both EYES every 4 hours  atorvastatin 10 milliGRAM(s) Oral at bedtime  chlorhexidine 0.12% Liquid 15 milliLiter(s) Oral Mucosa every 12 hours  chlorhexidine 2% Cloths 1 Application(s) Topical daily  dextrose 5%. 1000 milliLiter(s) (50 mL/Hr) IV Continuous <Continuous>  dextrose 5%. 1000 milliLiter(s) (100 mL/Hr) IV Continuous <Continuous>  dextrose 50% Injectable 12.5 Gram(s) IV Push once  dextrose 50% Injectable 25 Gram(s) IV Push once  dextrose 50% Injectable 25 Gram(s) IV Push once  dextrose 50% Injectable 25 Gram(s) IV Push once  epoetin odalis (EPOGEN) Injectable 59248 Unit(s) IV Push <User Schedule>  ferrous    sulfate Liquid 300 milliGRAM(s) Oral daily  glucagon  Injectable 1 milliGRAM(s) IntraMuscular once  insulin lispro (ADMELOG) corrective regimen sliding scale   SubCutaneous every 6 hours  insulin NPH human recombinant 6 Unit(s) SubCutaneous every 6 hours  levETIRAcetam  Solution 1000 milliGRAM(s) Oral daily  levETIRAcetam  Solution 250 milliGRAM(s) Oral <User Schedule>  midodrine. 30 milliGRAM(s) Oral every 8 hours  pantoprazole  Injectable 40 milliGRAM(s) IV Push two times a day  petrolatum Ophthalmic Ointment 1 Application(s) Both EYES two times a day  polymyxin B IVPB 403504 Unit(s) IV Intermittent every 12 hours  sodium chloride 1 Gram(s) Oral two times a day    VITALS:  T(C): , Max: 37.6 (10-12-23 @ 14:04)  T(F): , Max: 99.7 (10-12-23 @ 14:04)  HR: 116 (10-13-23 @ 09:43)  BP: 114/51 (10-13-23 @ 09:43)  BP(mean): 70 (10-13-23 @ 09:43)  RR: 22 (10-13-23 @ 09:43)  SpO2: 100% (10-13-23 @ 09:43)    PHYSICAL EXAM:  Constitutional: minimally communicative at present; on vent  HEENT: trach-vent, (+)NGT  Respiratory: coarse BS b/l  Cardiovascular: tachy reg s1s2  Gastrointestinal: BS+, soft, NT/ND  Extremities: + peripheral edema  Neurological: tone WNL  Skin: No rashes  Access: (+)[facility]    LABS:                        7.5    13.54 )-----------( 321      ( 12 Oct 2023 06:29 )             23.8     10-12    131<L>  |  96<L>  |  28<H>  ----------------------------<  179<H>  4.2   |  27  |  1.35<H>    Ca    8.1<L>      12 Oct 2023 06:29  Phos  2.5     10-12  Mg     1.90     10-12

## 2023-10-13 NOTE — PROGRESS NOTE ADULT - SUBJECTIVE AND OBJECTIVE BOX
[facility] DEPARTMENT OF OPHTHALMOLOGY  ------------------------------------------------------------------------------  Latricia France MD, PGY-3  Contact: TEAMS  ------------------------------------------------------------------------------    Interval History: No acute events overnight.     MEDICATIONS  (STANDING):  albuterol    0.083% 2.5 milliGRAM(s) Nebulizer every 6 hours  apixaban 5 milliGRAM(s) Oral every 12 hours  artificial tears (preservative free) Ophthalmic Solution 1 Drop(s) Both EYES every 4 hours  atorvastatin 10 milliGRAM(s) Oral at bedtime  chlorhexidine 0.12% Liquid 15 milliLiter(s) Oral Mucosa every 12 hours  chlorhexidine 2% Cloths 1 Application(s) Topical daily  dextrose 5%. 1000 milliLiter(s) (100 mL/Hr) IV Continuous <Continuous>  dextrose 5%. 1000 milliLiter(s) (50 mL/Hr) IV Continuous <Continuous>  dextrose 50% Injectable 12.5 Gram(s) IV Push once  dextrose 50% Injectable 25 Gram(s) IV Push once  dextrose 50% Injectable 25 Gram(s) IV Push once  dextrose 50% Injectable 25 Gram(s) IV Push once  epoetin odalis (EPOGEN) Injectable 36138 Unit(s) IV Push <User Schedule>  ferrous    sulfate Liquid 300 milliGRAM(s) Oral daily  glucagon  Injectable 1 milliGRAM(s) IntraMuscular once  insulin lispro (ADMELOG) corrective regimen sliding scale   SubCutaneous every 6 hours  insulin NPH human recombinant 6 Unit(s) SubCutaneous every 6 hours  levETIRAcetam  Solution 1000 milliGRAM(s) Oral daily  levETIRAcetam  Solution 250 milliGRAM(s) Oral <User Schedule>  midodrine 10 milliGRAM(s) Oral once  midodrine. 30 milliGRAM(s) Oral every 8 hours  pantoprazole  Injectable 40 milliGRAM(s) IV Push two times a day  petrolatum Ophthalmic Ointment 1 Application(s) Both EYES two times a day  polymyxin B IVPB 420502 Unit(s) IV Intermittent every 12 hours  sodium chloride 1 Gram(s) Oral two times a day    MEDICATIONS  (PRN):  acetaminophen   Oral Liquid .. 650 milliGRAM(s) Oral every 6 hours PRN Temp greater or equal to 38C (100.4F), Mild Pain (1 - 3)  calamine/zinc oxide Lotion 1 Application(s) Topical two times a day PRN Rash and/or Itching  dextrose Oral Gel 15 Gram(s) Oral once PRN Blood Glucose LESS THAN 70 milliGRAM(s)/deciliter  sodium chloride 0.9% lock flush 10 milliLiter(s) IV Push every 1 hour PRN Pre/post blood products, medications, blood draw, and to maintain line patency      VITALS: T(C): 37.1 (10-13-23 @ 10:52)  T(F): 98.7 (10-13-23 @ 10:52), Max: 99.7 (10-12-23 @ 14:04)  HR: 118 (10-13-23 @ 11:23) (106 - 119)  BP: 100/55 (10-13-23 @ 10:52) (97/37 - 123/53)  RR:  (18 - 23)  SpO2:  (95% - 100%)  Wt(kg): --  General: AAO x 0, appropriate mood and affect    KENDY VA, EOM, color plates, CVF 2/2 patient status.    Pen light exam:  External: Flat OU  Lids/Lashes/Lacrimal Ducts: Flat OU. 1mm lagophthalmos OS  Sclera/Conjunctiva: white with trace chemosis OU  Cornea: Cl OD. D-folds centrally OS resolving. No dellen OS. trace SPK OU. No epi defects OU.  Anterior Chamber: D+F OU.   Iris: Flat OU  Lens: Cl OU

## 2023-10-13 NOTE — PROGRESS NOTE ADULT - SUBJECTIVE AND OBJECTIVE BOX
Subjective: Patient seen and examined. No new events except as noted.     REVIEW OF SYSTEMS:  Unable to obtain     MEDICATIONS:  MEDICATIONS  (STANDING):  albuterol    0.083% 2.5 milliGRAM(s) Nebulizer every 6 hours  apixaban 5 milliGRAM(s) Oral every 12 hours  artificial tears (preservative free) Ophthalmic Solution 1 Drop(s) Both EYES every 4 hours  atorvastatin 10 milliGRAM(s) Oral at bedtime  chlorhexidine 0.12% Liquid 15 milliLiter(s) Oral Mucosa every 12 hours  chlorhexidine 2% Cloths 1 Application(s) Topical daily  dextrose 5%. 1000 milliLiter(s) (50 mL/Hr) IV Continuous <Continuous>  dextrose 5%. 1000 milliLiter(s) (100 mL/Hr) IV Continuous <Continuous>  dextrose 50% Injectable 12.5 Gram(s) IV Push once  dextrose 50% Injectable 25 Gram(s) IV Push once  dextrose 50% Injectable 25 Gram(s) IV Push once  dextrose 50% Injectable 25 Gram(s) IV Push once  epoetin odalis (EPOGEN) Injectable 34929 Unit(s) IV Push <User Schedule>  ferrous    sulfate Liquid 300 milliGRAM(s) Oral daily  glucagon  Injectable 1 milliGRAM(s) IntraMuscular once  insulin lispro (ADMELOG) corrective regimen sliding scale   SubCutaneous every 6 hours  insulin NPH human recombinant 6 Unit(s) SubCutaneous every 6 hours  levETIRAcetam  Solution 1000 milliGRAM(s) Oral daily  levETIRAcetam  Solution 250 milliGRAM(s) Oral <User Schedule>  midodrine. 30 milliGRAM(s) Oral every 8 hours  pantoprazole  Injectable 40 milliGRAM(s) IV Push two times a day  petrolatum Ophthalmic Ointment 1 Application(s) Both EYES two times a day  polymyxin B IVPB 167579 Unit(s) IV Intermittent every 12 hours  sodium chloride 1 Gram(s) Oral two times a day      PHYSICAL EXAM:  T(C): 37.1 (10-13-23 @ 09:43), Max: 37.6 (10-12-23 @ 14:04)  HR: 118 (10-13-23 @ 11:23) (106 - 119)  BP: 114/51 (10-13-23 @ 09:43) (97/37 - 123/53)  RR: 22 (10-13-23 @ 09:43) (18 - 22)  SpO2: 99% (10-13-23 @ 11:23) (95% - 100%)  Wt(kg): --  I&O's Summary    12 Oct 2023 07:01  -  13 Oct 2023 07:00  --------------------------------------------------------  IN: 1260 mL / OUT: 1600 mL / NET: -340 mL            Appearance: NAD, +trach  HEENT: dry oral mucosa  Lymphatic: No lymphadenopathy  Cardiovascular: Normal S1 S2, No JVD, No murmurs, No edema  Respiratory: Decreased BS, + trach to vent	  Neuro: opens eyes  Gastrointestinal: Soft, Non-tender, + BS, + NGT  Skin: No rashes, No ecchymoses, No cyanosis	  Extremities: No strength/ROM 2/2 sedation, + BL LE edema  Vascular: Peripheral pulses palpable 2+ bilaterally      Mode: AC/ CMV (Assist Control/ Continuous Mandatory Ventilation), RR (machine): 20, TV (machine): 340, FiO2: 35, PEEP: 8, ITime: 0.8, MAP: 19, PC: 38, PIP: 46        LABS:    CARDIAC MARKERS:                                7.5    13.54 )-----------( 321      ( 12 Oct 2023 06:29 )             23.8     10-12    131<L>  |  96<L>  |  28<H>  ----------------------------<  179<H>  4.2   |  27  |  1.35<H>    Ca    8.1<L>      12 Oct 2023 06:29  Phos  2.5     10-12  Mg     1.90     10-12      proBNP:   Lipid Profile:   HgA1c:   TSH:             TELEMETRY: 	    ECG:  	  RADIOLOGY:   DIAGNOSTIC TESTING:  [ ] Echocardiogram:  [ ]  Catheterization:  [ ] Stress Test:    OTHER:

## 2023-10-13 NOTE — PROGRESS NOTE ADULT - ASSESSMENT
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 While in MICU patient noted with worsening renal failure requiring HD initiation, CGE/ Melena s/p EGD 8/31 found with esophagitis and erosive gastropathy, new right cerebellar infarct and fevers second to ESBL COLI and MSSA VAP, MSSA bacteremia, left ear otitis externa and drug rxn to cephalosporins Course further complicated by prolonged vent time s/p tracheostomy 9/1 and transferred to RCU 9/3 completed by recurrent fevers with high PIP, respiratory acidosis and concern for volume overloaded.   CTH repeated 9/26 for concern for encephalopathy - has known now - chronic bilateral cerebellar infarcts, L PCA infarct. L parietal hypodensity seen on prior CT likely artifactual  Repeat CTH 10/3 - unchanged  EEG 10/5 with L posterocentral/temporal spikes/sharp waves - doubt true focal seizure upon further review of EEG     Impression:   ? Focal seizure - has hx of bilateral R > L tremors   Metabolic encephalopathy related to shock, infection, doubt new stroke  L PCA stroke, ESUS s/p ILR, also with bilateral centrum semiovale watershed infarcts   Multiple embolic strokes on MRI 8/17 - R cerebellum, midbrain, multiple other tiny embolic infarcts.   Dementia   MSSA bacteremia, r/o endocarditis s/p Daptomycin  Acute popliteal DVT on Eliquis   Anemia     Recommendations   [] has multiple areas of epileptogenic potential on EEG, no clear seizure correlate seen. On Keppra but no improvement in mental status  [] consider MRI brain once stable  [] mgmt of hypotension per piumary team, remains full code  [] Keppra 500mg BID with extra 250mg after HD on HD days.  [] Abx per ID  [] New CTH from 9/26 and 10/3 without any evidence of acute infarct -> encephalopathy likely related to underlying metabolic derangements.  [] GI following for coffee ground emesis - esophagitis seen on colonoscopy, on ELiquis  [] family declined kidney biopsy for AIN -> s/p steroid tx   []  stop aspirin as back on Eliquis  [] C/w home Atorvastatin 10 mg qhs   [] would interrogate ILR and review tele to see if pAF -. no AF seen  [] continue to address GOC with family as pts  and daughter continue to remain hopeful    d/w     Katty Melvin,   Vascular Neurology  Office [phone]

## 2023-10-14 NOTE — PROVIDER CONTACT NOTE (CRITICAL VALUE NOTIFICATION) - PERSON GIVING RESULT:
Gina Gonzalez
ROC allen
Roman Gaona
Errol
Jeramy C
Stanley Mitchell
Jocelin Almanzar
PACO Gaona
Tomas Almanzar
JAYASHREE Rand
Jacqui, I
TAMRA Jo (who received information from Lab)

## 2023-10-14 NOTE — PROGRESS NOTE ADULT - SUBJECTIVE AND OBJECTIVE BOX
Subjective: Patient seen and examined. No new events except as noted.     REVIEW OF SYSTEMS:    Unable to obtain     MEDICATIONS:  MEDICATIONS  (STANDING):  albuterol    0.083% 2.5 milliGRAM(s) Nebulizer every 6 hours  apixaban 5 milliGRAM(s) Oral every 12 hours  artificial tears (preservative free) Ophthalmic Solution 1 Drop(s) Both EYES every 4 hours  atorvastatin 10 milliGRAM(s) Oral at bedtime  chlorhexidine 0.12% Liquid 15 milliLiter(s) Oral Mucosa every 12 hours  chlorhexidine 2% Cloths 1 Application(s) Topical daily  dextrose 5%. 1000 milliLiter(s) (50 mL/Hr) IV Continuous <Continuous>  dextrose 5%. 1000 milliLiter(s) (100 mL/Hr) IV Continuous <Continuous>  dextrose 50% Injectable 12.5 Gram(s) IV Push once  dextrose 50% Injectable 25 Gram(s) IV Push once  dextrose 50% Injectable 25 Gram(s) IV Push once  dextrose 50% Injectable 25 Gram(s) IV Push once  epoetin odalis (EPOGEN) Injectable 25178 Unit(s) IV Push <User Schedule>  ferrous    sulfate Liquid 300 milliGRAM(s) Oral daily  glucagon  Injectable 1 milliGRAM(s) IntraMuscular once  insulin lispro (ADMELOG) corrective regimen sliding scale   SubCutaneous every 6 hours  insulin NPH human recombinant 6 Unit(s) SubCutaneous every 6 hours  levETIRAcetam  Solution 1000 milliGRAM(s) Oral daily  levETIRAcetam  Solution 250 milliGRAM(s) Oral <User Schedule>  midodrine 10 milliGRAM(s) Oral once  midodrine. 30 milliGRAM(s) Oral every 8 hours  pantoprazole  Injectable 40 milliGRAM(s) IV Push two times a day  petrolatum Ophthalmic Ointment 1 Application(s) Both EYES four times a day  polymyxin B IVPB 607575 Unit(s) IV Intermittent every 12 hours  sodium chloride 1 Gram(s) Oral two times a day      PHYSICAL EXAM:  T(C): 37.2 (10-14-23 @ 18:00), Max: 38.6 (10-13-23 @ 22:01)  HR: 113 (10-14-23 @ 18:54) (107 - 121)  BP: 113/67 (10-14-23 @ 18:30) (99/56 - 119/51)  RR: 18 (10-14-23 @ 18:00) (18 - 20)  SpO2: 100% (10-14-23 @ 18:54) (94% - 100%)  Wt(kg): --  I&O's Summary    13 Oct 2023 07:01  -  14 Oct 2023 07:00  --------------------------------------------------------  IN: 880 mL / OUT: 2400 mL / NET: -1520 mL    14 Oct 2023 07:01  -  14 Oct 2023 20:10  --------------------------------------------------------  IN: 480 mL / OUT: 0 mL / NET: 480 mL              Appearance: NAD, +trach  HEENT: dry oral mucosa  Lymphatic: No lymphadenopathy  Cardiovascular: Normal S1 S2, No JVD, No murmurs, No edema  Respiratory: Decreased BS, + trach to vent	  Neuro: opens eyes  Gastrointestinal: Soft, Non-tender, + BS, + NGT  Skin: No rashes, No ecchymoses, No cyanosis	  Extremities: No strength/ROM 2/2 sedation, + BL LE edema  Vascular: Peripheral pulses palpable 2+ bilaterally      Mode: AC/ CMV (Assist Control/ Continuous Mandatory Ventilation), RR (machine): 20, TV (machine): 340, FiO2: 35, PEEP: 8, ITime: 0.8, MAP: 19, PC: 38, PIP: 46        LABS:    CARDIAC MARKERS:                                7.6    13.95 )-----------( 382      ( 13 Oct 2023 17:00 )             24.3     10-13    131<L>  |  95<L>  |  36<H>  ----------------------------<  182<H>  4.9   |  26  |  1.69<H>    Ca    8.3<L>      13 Oct 2023 13:50  Phos  3.4     10-13  Mg     2.10     10-13      proBNP:   Lipid Profile:   HgA1c:   TSH:             TELEMETRY: 	    ECG:  	  RADIOLOGY:   DIAGNOSTIC TESTING:  [ ] Echocardiogram:  [ ]  Catheterization:  [ ] Stress Test:    OTHER:

## 2023-10-14 NOTE — CHART NOTE - NSCHARTNOTEFT_GEN_A_CORE
Notified by RN that patient has temp of 101.5. Patient in NAD, at baseline.     Vital Signs Last 24 Hrs  T(C): 38.6 (13 Oct 2023 22:01), Max: 38.6 (13 Oct 2023 22:01)  T(F): 101.5 (13 Oct 2023 22:01), Max: 101.5 (13 Oct 2023 22:01)  HR: 118 (13 Oct 2023 22:01) (108 - 121)  BP: 113/47 (13 Oct 2023 22:01) (100/55 - 140/57)  BP(mean): 79 (13 Oct 2023 17:09) (70 - 80)  RR: 20 (13 Oct 2023 22:01) (18 - 23)  SpO2: 100% (13 Oct 2023 22:01) (95% - 100%)    Parameters below as of 13 Oct 2023 22:01  Patient On (Oxygen Delivery Method): ventilator    O2 Concentration (%): 35    < from: Xray Chest 1 View- PORTABLE-Urgent (Xray Chest 1 View- PORTABLE-Urgent .) (10.13.23 @ 03:37) >    IMPRESSION:  Similar pulmonary edema and right upper lobe airspace opacities which   could also represent edema although developing infection is not excluded.    Similar small loculated right pleural effusion.    --- End of Report---    < end of copied text >      Plan:  - Proteus and  PSA VAP/ Trachitis -  cw polymixan   - Blood Cx x2, CBC w/ diff, VBG w/ lactate, procal (OK to obtain labs at HD today as unable to obtain 2/2 hard stick)  - CXR as above   - UA, sputum Cx,  and RVP   - Tylenol PRN for fever  - Will continue to monitor closely

## 2023-10-14 NOTE — PROGRESS NOTE ADULT - ASSESSMENT
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 While in MICU patient noted with worsening renal failure requiring HD initiation, CGE/ Melena s/p EGD 8/31 found with esophagitis and erosive gastropathy, new right cerebellar infarct and fevers second to ESBL COLI and MSSA VAP, MSSA bacteremia, left ear otitis externa and drug rxn to cephalosporins. 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 Case discussed at length renal and given good UOP attempted renal recovery, however failed, and upon reinitiation of HD attempt. R IJ SHILEY failed. Attempted volume removal with Bumex GTT however course complicated by hypotension requiring transfer back to MICU 9/26 for pressor support. Diuresis dc'ed, pressors weaned off and stress steroids started. L IJ SHILEY placed (9/30) and HD reinstated. Course further complicated by AOCD and  PSA and Proteus VAP. Patient transferred back to RCU 10/1 with course complicated by volume overload and grossly resistant organisms.    NEUROLOGY  # NEW cerebella infarct   - Baseline MS AOX3 with short term memory changes per family however noted with concern for poor mentation in ICU  - MRI (8/17) with NEW right cerebellar infarct and old left PCA/occipital infarcts  - STEPHANIE (8/17) with AV showing two linear mobile echogenic elements attached to valve leaflets consistent with Lambel's excrescence, but no TRINITY thrombus, and lambel's excrescence with uncertain clinical implication.   - EEG (8/11-8/15) with no seizures   - ILR interrogation (9/7) with SR and PACs falsely recorded as AF.   - Continue on ASA and lipitor for medical management   - DC ASA per neuro - on ELiquis again     # Seizures   - Course complicated by questionable head and body shaking with prolactin elevated 9/8. Discussed for spot EEG however held given low likelihood of seizures noted and acute respiratory illnesses   - Course further complicated with right arm twitching and RPT EEG (10/4) with no seizure however but noted with increase seizure potential in left posterior quadrants.   - RPT EEG (10/5) with possible focal seizures and risk of focal-onset seizures from multiple locations, most prominent in the left posterior temporal/posterocentral region  - RPT EEG (10/6) with RUE twitching are likely non-epileptic in nature, however risk of focal-onset seizures from multiple locations  - Continue on Keppra PPX     # Metabolic vs Uremic Encephalopathy   - Lethargy noted with progression to stupor thought to be second to uremia.   - RPT CTH (9/26) with vague areas of hypoattenuation within the bilateral cerebellar hemispheres which may be compatible with acute/subacute ischemia.   - Discussed CTH with neurology and no signs of new infarct noted.   - HD reinstated in ICU with improvement in uremia however mentation remained   - Poor mentation likely in setting of toxic encephalopathy in setting of infections.     CARDIOVASCULAR  # HTN Urgency   # Septic vs vasoplegic shock   - Labile BPs ranging in between SBP 80s-200s and attempted labetalol, however noted with hypotension and bradycardia likely from BB toxicity vs shock state?    - Holding Labetalol, Catapres and Catapres.    - Attempted volume removal with bumex diuresis however noted with return of shock state requiring pressor support and transfer back to ICU.   - Pressors weaned off to stress dose (last day 10/4) and Midodrine   - Continue on Midodrine 30 TID (9/29 - ) and ensure timed 30-60 minutes prior to HD  - DO NOT HOLD MIDODRINE   - BP improved s/p albumin     # Hx of HFpEF with likely ADHF with volume overload.   - ECHO (7/2023) with EF 59 with severe LVH and diastolic dysfunction   - STEPHANIE (8/18) with normal LVRVSF however noted with increased LA pressures and persistent LA septal bowing into RA.   - ECHO (8/11) with EF 60 with mild LVH, normal LVRVSF, and mild ddfxn.  - Grossly volume overloaded and removing volume with HD midodrine assisted sessions     HEENT   # Left ear OE with acute on chronic left-sided otomastoiditis  - ENT evaluation (9/7) with no mastoid tenderness, however found with positive swelling and excoriation to left auricle and tenderness to manipulation of auricle. Debrided crusting of auricle and EAC evaluated with edema and unable to visualize TM. EAC debrided and found with watery exudate.   - CT IAC with acute on chronic left-sided otitis externa and acute on chronic left-sided otomastoiditis. Superimposed left-sided tympanosclerosis. Superimposed cholesteatoma formation within the left tympanomastoid cavity cannot be excluded  - Completed CiproHC (9/5 - 9/16), Bradford (9/1-10/1) and acetic acid and bacitracin.   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   - Continue on empiric valtrex 500mg BID (9/29 - 10/8) per ophtho and ID  - Continue preservative free artificial tears 4 times a day in the both eye  - Continue lacrilube ointment twice a day in the both eyes   - Ophthalmology reconsulted (10/12) given L-eye injection     # Oral Lesions with questionable Zoster vs Trauma?   - Patient with tongue pain and seen with anterior ulcerations consolidating and crusting and posterior ulceration.  - Case discussed with pathology resident and culture/ cytology sent.   - HSV negative and Path (9/6) with normal appearing epithelial cells singly and in clusters devoid of viral cytopathic effect.   - Continue on magic mouth wash for pain    RESPIRATORY  # AHHRF second to volume overload   - Hx of CKD and HFpEF presented with SOB and hypercarbia second to volume overload requiring intubation and HD initiation   - Vent weaning attempted however limited requiring tracheostomy (9/1) with IP   - Course complicated by PIPs elevated and found with tracheomalacia and volume overloaded.   - CT CHEST (9/8) with tracheomalacia, BL pleural effusions and continued consolidations   - Continue on vent and given TBM increased PEEP to 8 with improvement in dynamic collapse, but PIP waxes and wanes with volume overloaded and secretions.  - Continue on albuterol and chest PT  - Continue volume removal with HD   - Attempted HTS but noted with hemoptysis and held   - Hold Atrovent given thick secretions   - Hold IPV given high inspiratory pressures   - Vent changed to PS with improvement - VBG good   - Had inc wob /hypoxic poor volumes today placed back on volume control but pressures increasing pt required positioning to help with volumes - back on pressure control with better volumes /dec pressure   - Rpeat ABG done -ok      GI  # UGIB   - CGE x 1 episode on 8/24 and melena x 2 episodes on 8/31 likely residual blood.   - EGD (8/31) found with esophagitis and erosive gastropathy  - Continue on PPI BID through late October and then PPI QD     # Dysphagia   - NGT-TF continued   - Pending PEG however needs improvement prior to placement.     RENAL  # Progressive CKD   - Presented volume overload and progressive RUSS on CKD   - POCUS with no signs of hydronephrosis in ICU  - Pressor support started with improvement in renal function in ICU  - Attempted steroid course in ICU without improvement in renal function   - Case discussed at length with renal and initiated on HD in ICU   - Remain on HD while in RCU however noted to be volume overloaded. Attempted Bumex pushes and patient noted with good UOP.   - Attempted renal recovery in RCU however noted with decent UOP, but BUN/ CRE continued to rise without improvement on diuresis.   - Ultimately resumed on HD MWF TIW with midodrine assisted volume removal, however pressure remains soft with difficulty removing aggressive fluids.   - D/w Renal for additional UF session (10/12)    # Hyponatremia   - Continue on salt tabs 1G  (decreased 10/6) and weaning off as tolerated with volume removal   - Monitor sodium     # Hyperphosphatemia to Hypophosphatemia with HD  - PTH normal and elevated phos likely from renal failure  - Phos binder with Renvela started with improvement however then noted with hypophosphatemia and Renvela stopped.   - required replacment KPHOS today with good response - continue to monitor     INFECTIOUS DISEASE  # ESBL ECOLI and MSSA VAP c/b MSSA bacteremia   - RVP/ COVID (8/11) negative   - SCx (8/11) with MSSA s/p cefepime (8/12-8/13) and then ancef (8/14) however noted with drug reaction and then changed to vanco and aztreonam (8/12-8/13) in ICU .   - Course complicated by recurrent fevers and return of MSSA with bacteremia.   - SCx (9/1) with MSSA and ESBL ECOLI   - BAL (9/1) with MSSA   - BCx (9/1) with MSSA and cleared on (9/4)   - ECHO (9/6) unable to rule out endocarditis   - Continue on Vanco (9/4-9/22) however noted with rashes of uncertain etiology and replaced with Bradford (9/4 - 10/2) and DAPTO (9/26-10/2) for  completion.     # Proteus and  PSA VAP/ Trachitis   - Continued fevers and SCx (9/29) with MDR  PSA and Proteus   - Continued on Bradford as above however noted with resistance and changed to Zosyn (10/2 - 10/5) with course complicated by possible drug rxn. Zosyn changed to Aztreonam (10/5 - 10/6) however RPT SCx (10/3) with  PSA however only intermediate coverage to aztreonam and amikacin.  PSA grossly resistant to other antibiotics other than cephalosporins however patient with reactions in the past. Case discussed with ID and ID pharmacist and change to Polymyxin (10/6 - ) however continues to spike fevers likely second to Polymyxin only intermediate coverage and Recarbio resistant .   - Overall difficulty with ABX tailoring given allergic rxns and resistant organisms.   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  - Monitor HH     VASCULAR   # LLE POP DVT   - US BL LE (9/10) with acute left deep vein thrombosis of the left popliteal vein.  - RUE swollen and VA DOPPLER RUE (10/3) with R IJ DVT and R brachial DVT.  - Eliquis held for permacath however procedure held.   - Eliquis remains on hold second to mild suction trauma hemoptysis.   - Resumed Eliquis     # HD access  - L IJ CLAUDIA (9/27 - )   - Planned for IR permacath cancelled for today and will reattempt when infectious status improves.     ONC   # Hx of Breast CA   - Patient dx in 2018 and s/p BL mastectomy (radical on right) and chemo and RT.   - Supportive care.     ENDOCRINE  # IDDM2   - Continued on NPH with ISS, however noted with hypoglycemic episodes and NPH dc'ed.    - Finger sticks trending up off NPH.   - Continue on NPH 6U with moderate ISS.   - Adjust as need     SKIN  # Drug Eruption   - Attempted cefepime (8/12) vs ancef (8/13-8/14) and then noted with drug rxn in ICU. Continue on steroids with prednisone 40 (8/18 - 9/2) then prednisone 30 (9/3-9/7), then prednisone 20 (9/8 - 9/10), then prednisone 10mg (9/11-9/13) then turn off.   - Attempted Zosyn (10/2-10/5) with possible rash. Zosyn turned off with improvement.   - Hold further cephalosporins or PCNs at this time   - Monitor for further drug rxns.     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 ESRD s/p ICU and intubation. Course further c/b GIB s/p EGD, new R cerebellar infarct, and pneumonia/bacteremia, and prolonged respiratory failure require tracheostomy.       #NEURO  Previous episodes of possible seizure, EEG with possible focal seizure with RUE twitching and at risk of focal onset seizures from multiple locations, especially left posterior temporal/posterocentral region, likely due to encephalomalacia/gliosis from prior left parietal CVA. Also with underlying encephalopathy due to acute illnes + RUSS + sepsis + history of CVA's (MICU course complicated by new R cerebellar, midbrain, and multiple tiny embolic infarcts as well as known left PCA CVA). Repeat CT head on 10/3 with no acute changes. Ammonia normal. BUN improved. Continue vEEG monitoring.  -  on levetiracetam.   - no evidence of seizure today, remains unresponsive, when stable will send for MRI  - dc asa while on apixaban    #Renal  -HD with fluid removal per renal. Hold HD if hypotensive, midodrine to be given prior - will discuss with nephrology, may benefit from extra HD days for additional fluid removal   - continues to be grossly fluid overloaded on exam   -  Anemia of CKD, continue Epoetin per nephrology. Hb slowly decreasing, likely transfuse 1 u pRBC today   - tolerating HD today     #ID  - Repeat sputum culture with numerous carbapenem-resistant Pseudomonas aeruginosa. However, she is allergic to PCNs and cephalosporins. Appreciate ID follow-up, currently on Polymyxin B. Appreciate ENT follow-up for left ear otitis externa, which has now resolved. Follow-up ophthalmology regarding keratitis + chemosis, on Lacrilube and artificial tears.   - ongoing sepsis with no better antibiotic options given allergies and resistance profile  - appreciate ID input    #CVS  - hypotensive, Continue midodrine 30 mg q8h.  -  Continue aspirin and statin given h/o CVA  - On apixaban for DVT.      #Pulm   - Continue lung protective ventilation. On 10/11 Very high peak pressures on VC - PIP 65 on ,   RR 22, FiO2 35 - inadequate volumes, repositoined as diaphragmatic compression worsened high PIP - when lowered legs, head of bed improved PIP. Switched back to PC 45/8, with volumes of ~310-320 cc.    - post fluid removal by HD pressures are improved, titrated down PC top 40/8 with volumes ~320 cc, continue to adjust as tolerated - most recent gas (labeled ABG, likely VBG) with worsening respiratory acidosis - will repeat and obtain true ABG, increase RR - continue to monitor TV, if decreases will increase Pressure to 45/8  - Airway clearance therapy with albuterol  - Bloody secretions noted upon suction on 10/9- FiO2 requirements unchanged. Will discontinue hypertonic saline, continue albuterol. Monitor closely, if develops antonina hemoptysis start TXA nebs 500 mg q8h standing for 72 hrs  - no further hemoptysis today   - ABG with increased hypcapnea and RR increased from 22->24 with improvement (10/13)    #GI  tube feeds as tolerates. PPI for GI ppx.  - check LFTs    #GOALS OF CARE   Goals of care meeting with daughter and , BETTINA Candelario and Nurse Manager present on 10/9/ We discussed Ms. Barraza's current clinical condition detailing management above. All questions from family answered. I informed family that despite all our efforts she is extremely ill and at risk for further deterioration. They understand and wish to pursue all possible medical interventions. She will remain full code.      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 While in MICU patient noted with worsening renal failure requiring HD initiation, CGE/ Melena s/p EGD 8/31 found with esophagitis and erosive gastropathy, new right cerebellar infarct and fevers second to ESBL COLI and MSSA VAP, MSSA bacteremia, left ear otitis externa and drug rxn to cephalosporins. 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 Case discussed at length renal and given good UOP attempted renal recovery, however failed, and upon reinitiation of HD attempt. R IJ SHILEY failed. Attempted volume removal with Bumex GTT however course complicated by hypotension requiring transfer back to MICU 9/26 for pressor support. Diuresis dc'ed, pressors weaned off and stress steroids started. L IJ SHILEY placed (9/30) and HD reinstated. Course further complicated by AOCD and  PSA and Proteus VAP. Patient transferred back to RCU 10/1 with course complicated by volume overload and grossly resistant organisms.    NEUROLOGY  # NEW cerebella infarct   - Baseline MS AOX3 with short term memory changes per family however noted with concern for poor mentation in ICU  - MRI (8/17) with NEW right cerebellar infarct and old left PCA/occipital infarcts  - STEPHANIE (8/17) with AV showing two linear mobile echogenic elements attached to valve leaflets consistent with Lambel's excrescence, but no TRINITY thrombus, and lambel's excrescence with uncertain clinical implication.   - EEG (8/11-8/15) with no seizures   - ILR interrogation (9/7) with SR and PACs falsely recorded as AF.   - Continue on ASA and lipitor for medical management   - DC ASA per neuro - on ELiquis again     # Seizures   - Course complicated by questionable head and body shaking with prolactin elevated 9/8. Discussed for spot EEG however held given low likelihood of seizures noted and acute respiratory illnesses   - Course further complicated with right arm twitching and RPT EEG (10/4) with no seizure however but noted with increase seizure potential in left posterior quadrants.   - RPT EEG (10/5) with possible focal seizures and risk of focal-onset seizures from multiple locations, most prominent in the left posterior temporal/posterocentral region  - RPT EEG (10/6) with RUE twitching are likely non-epileptic in nature, however risk of focal-onset seizures from multiple locations  - Continue on Keppra PPX     # Metabolic vs Uremic Encephalopathy   - Lethargy noted with progression to stupor thought to be second to uremia.   - RPT CTH (9/26) with vague areas of hypoattenuation within the bilateral cerebellar hemispheres which may be compatible with acute/subacute ischemia.   - Discussed CTH with neurology and no signs of new infarct noted.   - HD reinstated in ICU with improvement in uremia however mentation remained   - Poor mentation likely in setting of toxic encephalopathy in setting of infections.     CARDIOVASCULAR  # HTN Urgency   # Septic vs vasoplegic shock   - Labile BPs ranging in between SBP 80s-200s and attempted labetalol, however noted with hypotension and bradycardia likely from BB toxicity vs shock state?    - Holding Labetalol, Catapres and Catapres.    - Attempted volume removal with bumex diuresis however noted with return of shock state requiring pressor support and transfer back to ICU.   - Pressors weaned off to stress dose (last day 10/4) and Midodrine   - Continue on Midodrine 30 TID (9/29 - ) and ensure timed 30-60 minutes prior to HD  - DO NOT HOLD MIDODRINE   - BP improved s/p albumin     # Hx of HFpEF with likely ADHF with volume overload.   - ECHO (7/2023) with EF 59 with severe LVH and diastolic dysfunction   - STEPHANIE (8/18) with normal LVRVSF however noted with increased LA pressures and persistent LA septal bowing into RA.   - ECHO (8/11) with EF 60 with mild LVH, normal LVRVSF, and mild ddfxn.  - Grossly volume overloaded and removing volume with HD midodrine assisted sessions     HEENT   # Left ear OE with acute on chronic left-sided otomastoiditis  - ENT evaluation (9/7) with no mastoid tenderness, however found with positive swelling and excoriation to left auricle and tenderness to manipulation of auricle. Debrided crusting of auricle and EAC evaluated with edema and unable to visualize TM. EAC debrided and found with watery exudate.   - CT IAC with acute on chronic left-sided otitis externa and acute on chronic left-sided otomastoiditis. Superimposed left-sided tympanosclerosis. Superimposed cholesteatoma formation within the left tympanomastoid cavity cannot be excluded  - Completed CiproHC (9/5 - 9/16), Bradford (9/1-10/1) and acetic acid and bacitracin.   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   - Continue on empiric valtrex 500mg BID (9/29 - 10/8) per ophtho and ID  - Continue preservative free artificial tears 4 times a day in the both eye  - Continue lacrilube ointment twice a day in the both eyes   - Ophthalmology reconsulted (10/12) given L-eye injection     # Oral Lesions with questionable Zoster vs Trauma?   - Patient with tongue pain and seen with anterior ulcerations consolidating and crusting and posterior ulceration.  - Case discussed with pathology resident and culture/ cytology sent.   - HSV negative and Path (9/6) with normal appearing epithelial cells singly and in clusters devoid of viral cytopathic effect.   - Continue on magic mouth wash for pain    RESPIRATORY  # AHHRF second to volume overload   - Hx of CKD and HFpEF presented with SOB and hypercarbia second to volume overload requiring intubation and HD initiation   - Vent weaning attempted however limited requiring tracheostomy (9/1) with IP   - Course complicated by PIPs elevated and found with tracheomalacia and volume overloaded.   - CT CHEST (9/8) with tracheomalacia, BL pleural effusions and continued consolidations   - Continue on vent and given TBM increased PEEP to 8 with improvement in dynamic collapse, but PIP waxes and wanes with volume overloaded and secretions.  - Continue on albuterol and chest PT  - Continue volume removal with HD   - Attempted HTS but noted with hemoptysis and held   - Hold Atrovent given thick secretions   - Hold IPV given high inspiratory pressures   - Vent changed to PS with improvement - VBG good   - Had inc wob /hypoxic poor volumes today placed back on volume control but pressures increasing pt required positioning to help with volumes - back on pressure control with better volumes /dec pressure   - Rpeat ABG done -ok      GI  # UGIB   - CGE x 1 episode on 8/24 and melena x 2 episodes on 8/31 likely residual blood.   - EGD (8/31) found with esophagitis and erosive gastropathy  - Continue on PPI BID through late October and then PPI QD     # Dysphagia   - NGT-TF continued   - Pending PEG however needs improvement prior to placement.     RENAL  # Progressive CKD   - Presented volume overload and progressive RUSS on CKD   - POCUS with no signs of hydronephrosis in ICU  - Pressor support started with improvement in renal function in ICU  - Attempted steroid course in ICU without improvement in renal function   - Case discussed at length with renal and initiated on HD in ICU   - Remain on HD while in RCU however noted to be volume overloaded. Attempted Bumex pushes and patient noted with good UOP.   - Attempted renal recovery in RCU however noted with decent UOP, but BUN/ CRE continued to rise without improvement on diuresis.   - Ultimately resumed on HD MWF TIW with midodrine assisted volume removal, however pressure remains soft with difficulty removing aggressive fluids.   - D/w Renal for additional UF session (10/12)    # Hyponatremia   - Continue on salt tabs 1G  (decreased 10/6) and weaning off as tolerated with volume removal   - Monitor sodium     # Hyperphosphatemia to Hypophosphatemia with HD  - PTH normal and elevated phos likely from renal failure  - Phos binder with Renvela started with improvement however then noted with hypophosphatemia and Renvela stopped.   - required replacment KPHOS today with good response - continue to monitor     INFECTIOUS DISEASE  # ESBL ECOLI and MSSA VAP c/b MSSA bacteremia   - RVP/ COVID (8/11) negative   - SCx (8/11) with MSSA s/p cefepime (8/12-8/13) and then ancef (8/14) however noted with drug reaction and then changed to vanco and aztreonam (8/12-8/13) in ICU .   - Course complicated by recurrent fevers and return of MSSA with bacteremia.   - SCx (9/1) with MSSA and ESBL ECOLI   - BAL (9/1) with MSSA   - BCx (9/1) with MSSA and cleared on (9/4)   - ECHO (9/6) unable to rule out endocarditis   - Continue on Vanco (9/4-9/22) however noted with rashes of uncertain etiology and replaced with Bradford (9/4 - 10/2) and DAPTO (9/26-10/2) for  completion.     # Proteus and  PSA VAP/ Trachitis   - Continued fevers and SCx (9/29) with MDR  PSA and Proteus   - Continued on Bradford as above however noted with resistance and changed to Zosyn (10/2 - 10/5) with course complicated by possible drug rxn. Zosyn changed to Aztreonam (10/5 - 10/6) however RPT SCx (10/3) with  PSA however only intermediate coverage to aztreonam and amikacin.  PSA grossly resistant to other antibiotics other than cephalosporins however patient with reactions in the past. Case discussed with ID and ID pharmacist and change to Polymyxin (10/6 - ) however continues to spike fevers likely second to Polymyxin only intermediate coverage and Recarbio resistant .   - Overall difficulty with ABX tailoring given allergic rxns and resistant organisms.   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  - Monitor HH     VASCULAR   # LLE POP DVT   - US BL LE (9/10) with acute left deep vein thrombosis of the left popliteal vein.  - RUE swollen and VA DOPPLER RUE (10/3) with R IJ DVT and R brachial DVT.  - Eliquis held for permacath however procedure held.   - Eliquis remains on hold second to mild suction trauma hemoptysis.   - Resumed Eliquis     # HD access  - L IJ CLAUDIA (9/27 - )   - Planned for IR permacath cancelled for today and will reattempt when infectious status improves.     ONC   # Hx of Breast CA   - Patient dx in 2018 and s/p BL mastectomy (radical on right) and chemo and RT.   - Supportive care.     ENDOCRINE  # IDDM2   - Continued on NPH with ISS, however noted with hypoglycemic episodes and NPH dc'ed.    - Finger sticks trending up off NPH.   - Continue on NPH 6U with moderate ISS.   - Adjust as need     SKIN  # Drug Eruption   - Attempted cefepime (8/12) vs ancef (8/13-8/14) and then noted with drug rxn in ICU. Continue on steroids with prednisone 40 (8/18 - 9/2) then prednisone 30 (9/3-9/7), then prednisone 20 (9/8 - 9/10), then prednisone 10mg (9/11-9/13) then turn off.   - Attempted Zosyn (10/2-10/5) with possible rash. Zosyn turned off with improvement.   - Hold further cephalosporins or PCNs at this time   - Monitor for further drug rxns.     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 ESRD s/p ICU and intubation. Course further c/b GIB s/p EGD, new R cerebellar infarct, and pneumonia/bacteremia, and prolonged respiratory failure require tracheostomy.       #NEURO  Previous episodes of possible seizure, EEG with possible focal seizure with RUE twitching and at risk of focal onset seizures from multiple locations, especially left posterior temporal/posterocentral region, likely due to encephalomalacia/gliosis from prior left parietal CVA. Also with underlying encephalopathy due to acute illnes + RUSS + sepsis + history of CVA's (MICU course complicated by new R cerebellar, midbrain, and multiple tiny embolic infarcts as well as known left PCA CVA). Repeat CT head on 10/3 with no acute changes. Ammonia normal. BUN improved. Continue vEEG monitoring.  -  on levetiracetam.   - no evidence of seizure today, remains unresponsive, when stable will send for MRI  - dc asa while on apixaban    #Renal  -HD with fluid removal per renal. Hold HD if hypotensive, midodrine to be given prior - will discuss with nephrology, may benefit from extra HD days for additional fluid removal   - continues to be grossly fluid overloaded on exam   -  Anemia of CKD, continue Epoetin per nephrology. Hb slowly decreasing, likely transfuse 1 u pRBC today   - tolerating HD today     #ID  - Repeat sputum culture with numerous carbapenem-resistant Pseudomonas aeruginosa. However, she is allergic to PCNs and cephalosporins. Appreciate ID follow-up, currently on Polymyxin B. Appreciate ENT follow-up for left ear otitis externa, which has now resolved. Follow-up ophthalmology regarding keratitis + chemosis, on Lacrilube and artificial tears.   - ongoing sepsis with no better antibiotic options given allergies and resistance profile  - appreciate ID input    #CVS  - hypotensive, Continue midodrine 30 mg q8h.  -  Continue aspirin and statin given h/o CVA  - On apixaban for DVT.      #Pulm   - Continue lung protective ventilation. On 10/11 Very high peak pressures on VC - PIP 65 on ,   RR 22, FiO2 35 - inadequate volumes, repositoined as diaphragmatic compression worsened high PIP - when lowered legs, head of bed improved PIP. Switched back to PC 45/8, with volumes of ~310-320 cc.    - post fluid removal by HD pressures are improved, titrated down PC top 40/8 with volumes ~320 cc, continue to adjust as tolerated - most recent gas (labeled ABG, likely VBG) with worsening respiratory acidosis - will repeat and obtain true ABG, increase RR - continue to monitor TV, if decreases will increase Pressure to 45/8  - Airway clearance therapy with albuterol  - Bloody secretions noted upon suction on 10/9- FiO2 requirements unchanged. Will discontinue hypertonic saline, continue albuterol. Monitor closely, if develops antonina hemoptysis start TXA nebs 500 mg q8h standing for 72 hrs  - no further hemoptysis today   - ABG with increased hypcapnea and RR increased from 22->24 with improvement (10/13)    #GI  tube feeds as tolerates. PPI for GI ppx.  - check LFTs    #GOALS OF CARE   Goals of care meeting with daughter and , BETTINA Candelario and Nurse Manager present on 10/9/ We discussed Ms. Barraza's current clinical condition detailing management above. All questions from family answered. I informed family that despite all our efforts she is extremely ill and at risk for further deterioration. They understand and wish to pursue all possible medical interventions. She will remain full code.   ************************  10/14-complaints about trach (as per )   74 YO F with PMHx of IDDM2, HTN, Diabetic Nephropathic CKD, HFpEF, Breast Cancer s/p BL mastectomy, chemotherapy and radiation (2018), Respiratory and Cardiac Arrest (2018), 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 While in MICU patient noted with worsening renal failure requiring HD initiation, CGE/ Melena s/p EGD 8/31 found with esophagitis and erosive gastropathy, new right cerebellar infarct and fevers second to ESBL COLI and MSSA VAP, MSSA bacteremia, left ear otitis externa and drug rxn to cephalosporins. 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 Case discussed at length renal and given good UOP attempted renal recovery, however failed, and upon reinitiation of HD attempt. R IJ SHILEY failed. Attempted volume removal with Bumex GTT however course complicated by hypotension requiring transfer back to MICU 9/26 for pressor support. Diuresis dc'ed, pressors weaned off and stress steroids started. L IJ SHILEY placed (9/30) and HD reinstated. Course further complicated by AOCD and  PSA and Proteus VAP. Patient transferred back to RCU 10/1 with course complicated by volume overload and grossly resistant organisms.    NEUROLOGY  # NEW cerebella infarct   - Baseline MS AOX3 with short term memory changes per family however noted with concern for poor mentation in ICU  - MRI (8/17) with NEW right cerebellar infarct and old left PCA/occipital infarcts  - STEPHANIE (8/17) with AV showing two linear mobile echogenic elements attached to valve leaflets consistent with Lambel's excrescence, but no TRINITY thrombus, and lambel's excrescence with uncertain clinical implication.   - EEG (8/11-8/15) with no seizures   - ILR interrogation (9/7) with SR and PACs falsely recorded as AF.   - Continue on ASA and lipitor for medical management   - DC ASA per neuro - on Eliquis again     # Seizures   - Course complicated by questionable head and body shaking with prolactin elevated 9/8. Discussed for spot EEG however held given low likelihood of seizures noted and acute respiratory illnesses   - Course further complicated with right arm twitching and RPT EEG (10/4) with no seizure however but noted with increase seizure potential in left posterior quadrants.   - RPT EEG (10/5) with possible focal seizures and risk of focal-onset seizures from multiple locations, most prominent in the left posterior temporal/posterocentral region  - RPT EEG (10/6) with RUE twitching are likely non-epileptic in nature, however risk of focal-onset seizures from multiple locations  - Continue on Keppra PPX     # Metabolic vs Uremic Encephalopathy   - Lethargy noted with progression to stupor thought to be second to uremia.   - RPT CTH (9/26) with vague areas of hypoattenuation within the bilateral cerebellar hemispheres which may be compatible with acute/subacute ischemia.   - Discussed CTH with neurology and no signs of new infarct noted.   - HD reinstated in ICU with improvement in uremia however mentation remained   - Poor mentation likely in setting of toxic encephalopathy in setting of infections.     CARDIOVASCULAR  # HTN Urgency   # Septic vs vasoplegic shock   - Labile BPs ranging in between SBP 80s-200s and attempted labetalol, however noted with hypotension and bradycardia likely from BB toxicity vs shock state?    - Holding Labetalol, Catapres and Catapres.    - Attempted volume removal with bumex diuresis however noted with return of shock state requiring pressor support and transfer back to ICU.   - Pressors weaned off to stress dose (last day 10/4) and Midodrine   - Continue on Midodrine 30 TID (9/29 - ) and ensure timed 30-60 minutes prior to HD  - DO NOT HOLD MIDODRINE   - BP improved s/p albumin     # Hx of HFpEF with likely ADHF with volume overload.   - ECHO (7/2023) with EF 59 with severe LVH and diastolic dysfunction   - STEPHANIE (8/18) with normal LVRVSF however noted with increased LA pressures and persistent LA septal bowing into RA.   - ECHO (8/11) with EF 60 with mild LVH, normal LVRVSF, and mild ddfxn.  - Grossly volume overloaded and removing volume with HD midodrine assisted sessions     HEENT   # Left ear OE with acute on chronic left-sided otomastoiditis  - ENT evaluation (9/7) with no mastoid tenderness, however found with positive swelling and excoriation to left auricle and tenderness to manipulation of auricle. Debrided crusting of auricle and EAC evaluated with edema and unable to visualize TM. EAC debrided and found with watery exudate.   - CT IAC with acute on chronic left-sided otitis externa and acute on chronic left-sided otomastoiditis. Superimposed left-sided tympanosclerosis. Superimposed cholesteatoma formation within the left tympanomastoid cavity cannot be excluded  - Completed CiproHC (9/5 - 9/16), Bradford (9/1-10/1) and acetic acid and bacitracin.   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   - Continue on empiric valtrex 500mg BID (9/29 - 10/8) per ophtho and ID  - Continue preservative free artificial tears 4 times a day in the both eye  - Continue lacrilube ointment twice a day in the both eyes   - Ophthalmology reconsulted (10/12) given L-eye injection     # Oral Lesions with questionable Zoster vs Trauma?   - Patient with tongue pain and seen with anterior ulcerations consolidating and crusting and posterior ulceration.  - Case discussed with pathology resident and culture/ cytology sent.   - HSV negative and Path (9/6) with normal appearing epithelial cells singly and in clusters devoid of viral cytopathic effect.   - Continue on magic mouth wash for pain    RESPIRATORY  # AHHRF second to volume overload   - Hx of CKD and HFpEF presented with SOB and hypercarbia second to volume overload requiring intubation and HD initiation   - Vent weaning attempted however limited requiring tracheostomy (9/1) with IP   - Course complicated by PIPs elevated and found with tracheomalacia and volume overloaded.   - CT CHEST (9/8) with tracheomalacia, BL pleural effusions and continued consolidations   - Continue on vent and given TBM increased PEEP to 8 with improvement in dynamic collapse, but PIP waxes and wanes with volume overloaded and secretions.  - Continue on albuterol and chest PT  - Continue volume removal with HD   - Attempted HTS but noted with hemoptysis and held   - Hold Atrovent given thick secretions   - Hold IPV given high inspiratory pressures   - Vent changed to PS with improvement - VBG good   - Had inc wob /hypoxic poor volumes today placed back on volume control but pressures increasing pt required positioning to help with volumes - back on pressure control with better volumes /dec pressure   - Rpeat ABG done -ok      GI  # UGIB   - CGE x 1 episode on 8/24 and melena x 2 episodes on 8/31 likely residual blood.   - EGD (8/31) found with esophagitis and erosive gastropathy  - Continue on PPI BID through late October and then PPI QD     # Dysphagia   - NGT-TF continued   - Pending PEG however needs improvement prior to placement.     RENAL  # Progressive CKD   - Presented volume overload and progressive RUSS on CKD   - POCUS with no signs of hydronephrosis in ICU  - Pressor support started with improvement in renal function in ICU  - Attempted steroid course in ICU without improvement in renal function   - Case discussed at length with renal and initiated on HD in ICU   - Remain on HD while in RCU however noted to be volume overloaded. Attempted Bumex pushes and patient noted with good UOP.   - Attempted renal recovery in RCU however noted with decent UOP, but BUN/ CRE continued to rise without improvement on diuresis.   - Ultimately resumed on HD MWF TIW with midodrine assisted volume removal, however pressure remains soft with difficulty removing aggressive fluids.   - D/w Renal for additional UF session (10/12)    # Hyponatremia   - Continue on salt tabs 1G  (decreased 10/6) and weaning off as tolerated with volume removal   - Monitor sodium     # Hyperphosphatemia to Hypophosphatemia with HD  - PTH normal and elevated phos likely from renal failure  - Phos binder with Renvela started with improvement however then noted with hypophosphatemia and Renvela stopped.   - required replacment KPHOS today with good response - continue to monitor     INFECTIOUS DISEASE  # ESBL ECOLI and MSSA VAP c/b MSSA bacteremia   - RVP/ COVID (8/11) negative   - SCx (8/11) with MSSA s/p cefepime (8/12-8/13) and then ancef (8/14) however noted with drug reaction and then changed to vanco and aztreonam (8/12-8/13) in ICU .   - Course complicated by recurrent fevers and return of MSSA with bacteremia.   - SCx (9/1) with MSSA and ESBL ECOLI   - BAL (9/1) with MSSA   - BCx (9/1) with MSSA and cleared on (9/4)   - ECHO (9/6) unable to rule out endocarditis   - Continue on Vanco (9/4-9/22) however noted with rashes of uncertain etiology and replaced with Bradford (9/4 - 10/2) and DAPTO (9/26-10/2) for  completion.     # Proteus and  PSA VAP/ Trachitis   - Continued fevers and SCx (9/29) with MDR  PSA and Proteus   - Continued on Bradford as above however noted with resistance and changed to Zosyn (10/2 - 10/5) with course complicated by possible drug rxn. Zosyn changed to Aztreonam (10/5 - 10/6) however RPT SCx (10/3) with  PSA however only intermediate coverage to aztreonam and amikacin.  PSA grossly resistant to other antibiotics other than cephalosporins however patient with reactions in the past. Case discussed with ID and ID pharmacist and change to Polymyxin (10/6 - ) however continues to spike fevers likely second to Polymyxin only intermediate coverage and Recarbio resistant .   - Overall difficulty with ABX tailoring given allergic rxns and resistant organisms.   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  - Monitor HH     VASCULAR   # LLE POP DVT   - US BL LE (9/10) with acute left deep vein thrombosis of the left popliteal vein.  - RUE swollen and VA DOPPLER RUE (10/3) with R IJ DVT and R brachial DVT.  - Eliquis held for permacath however procedure held.   - Eliquis remains on hold second to mild suction trauma hemoptysis.   - Resumed Eliquis     # HD access  - L IJ CLAUDIA (9/27 - )   - Planned for IR permacath cancelled for today and will reattempt when infectious status improves.     ONC   # Hx of Breast CA   - Patient dx in 2018 and s/p BL mastectomy (radical on right) and chemo and RT.   - Supportive care.     ENDOCRINE  # IDDM2   - Continued on NPH with ISS, however noted with hypoglycemic episodes and NPH dc'ed.    - Finger sticks trending up off NPH.   - Continue on NPH 6U with moderate ISS.   - Adjust as need     SKIN  # Drug Eruption   - Attempted cefepime (8/12) vs ancef (8/13-8/14) and then noted with drug rxn in ICU. Continue on steroids with prednisone 40 (8/18 - 9/2) then prednisone 30 (9/3-9/7), then prednisone 20 (9/8 - 9/10), then prednisone 10mg (9/11-9/13) then turn off.   - Attempted Zosyn (10/2-10/5) with possible rash. Zosyn turned off with improvement.   - Hold further cephalosporins or PCNs at this time   - Monitor for further drug rxns.     ETHICS/ GOC    - Attempted palliative discussion however family not interested and wishes for FULL CODE    DISPO - TBD   ************************  10/14-complaints about trach (as per )   no

## 2023-10-14 NOTE — CHART NOTE - NSCHARTNOTEFT_GEN_A_CORE
Labs obtained during HD significant for HH of 6.8/22.1. No signs/symptoms of acute bleeding. Patient hemodynamically stable. Patient is now s/p HD w/ 1L fluid removal. Will give 1/2 PRBC over 3 hours x2. Will fu post transfusion CBC.     Vital Signs Last 24 Hrs  T(C): 37.2 (14 Oct 2023 20:32), Max: 37.9 (14 Oct 2023 05:04)  T(F): 98.9 (14 Oct 2023 20:32), Max: 100.2 (14 Oct 2023 05:04)  HR: 101 (14 Oct 2023 20:32) (100 - 121)  BP: 119/51 (14 Oct 2023 20:32) (99/56 - 119/51)  BP(mean): --  RR: 18 (14 Oct 2023 20:32) (18 - 20)  SpO2: 100% (14 Oct 2023 20:32) (94% - 100%)    Parameters below as of 14 Oct 2023 20:00  Patient On (Oxygen Delivery Method): ventilator    O2 Concentration (%): 35                          6.8    14.00 )-----------( 394      ( 14 Oct 2023 21:02 )             22.1

## 2023-10-14 NOTE — PROGRESS NOTE ADULT - ASSESSMENT
trach to vent  s/p UF yesterday  afebrile,  NAD  due for HD today  updated  at bedside    acetaminophen   Oral Liquid .. 650 milliGRAM(s) Oral every 6 hours PRN  albuterol    0.083% 2.5 milliGRAM(s) Nebulizer every 6 hours  apixaban 5 milliGRAM(s) Oral every 12 hours  artificial tears (preservative free) Ophthalmic Solution 1 Drop(s) Both EYES every 4 hours  atorvastatin 10 milliGRAM(s) Oral at bedtime  calamine/zinc oxide Lotion 1 Application(s) Topical two times a day PRN  chlorhexidine 0.12% Liquid 15 milliLiter(s) Oral Mucosa every 12 hours  chlorhexidine 2% Cloths 1 Application(s) Topical daily  dextrose 5%. 1000 milliLiter(s) IV Continuous <Continuous>  dextrose 5%. 1000 milliLiter(s) IV Continuous <Continuous>  dextrose 50% Injectable 12.5 Gram(s) IV Push once  dextrose 50% Injectable 25 Gram(s) IV Push once  dextrose 50% Injectable 25 Gram(s) IV Push once  dextrose 50% Injectable 25 Gram(s) IV Push once  dextrose Oral Gel 15 Gram(s) Oral once PRN  epoetin odalis (EPOGEN) Injectable 94977 Unit(s) IV Push <User Schedule>  ferrous    sulfate Liquid 300 milliGRAM(s) Oral daily  glucagon  Injectable 1 milliGRAM(s) IntraMuscular once  insulin lispro (ADMELOG) corrective regimen sliding scale   SubCutaneous every 6 hours  insulin NPH human recombinant 6 Unit(s) SubCutaneous every 6 hours  levETIRAcetam  Solution 1000 milliGRAM(s) Oral daily  levETIRAcetam  Solution 250 milliGRAM(s) Oral <User Schedule>  midodrine 10 milliGRAM(s) Oral once  midodrine. 30 milliGRAM(s) Oral every 8 hours  pantoprazole  Injectable 40 milliGRAM(s) IV Push two times a day  petrolatum Ophthalmic Ointment 1 Application(s) Both EYES four times a day  polymyxin B IVPB 075265 Unit(s) IV Intermittent every 12 hours  sodium chloride 1 Gram(s) Oral two times a day  sodium chloride 0.9% lock flush 10 milliLiter(s) IV Push every 1 hour PRN      VITAL:  T(C): , Max: 38.6 (10-13-23 @ 22:01)  T(F): , Max: 101.5 (10-13-23 @ 22:01)  HR: 110 (10-14-23 @ 08:13)  BP: 111/53 (10-14-23 @ 05:04)  BP(mean): 79 (10-13-23 @ 17:09)  RR: 19 (10-14-23 @ 05:04)  SpO2: 100% (10-14-23 @ 08:13)  Wt(kg): --    10-13-23 @ 07:01  -  10-14-23 @ 07:00  --------------------------------------------------------  IN: 880 mL / OUT: 2400 mL / NET: -1520 mL        PHYSICAL EXAM:  Constitutional: ill appearing, on vent  HEENT: trach-vent, (+)NGT  Respiratory: coarse BS b/l  Cardiovascular: tachy reg s1s2  Gastrointestinal: BS+, soft, NT/ND  Extremities: + peripheral edema  Neurological: tone WNL  Skin: No rashes  Access: (+)[facility]    LABS:                          7.6    13.95 )-----------( 382      ( 13 Oct 2023 17:00 )             24.3     Na(131)/K(4.9)/Cl(95)/HCO3(26)/BUN(36)/Cr(1.69)Glu(182)/Ca(8.3)/Mg(2.10)/PO4(3.4)    10-13 @ 13:50  Na(131)/K(4.2)/Cl(96)/HCO3(27)/BUN(28)/Cr(1.35)Glu(179)/Ca(8.1)/Mg(1.90)/PO4(2.5)    10-12 @ 06:29  Na(129)/K(4.7)/Cl(95)/HCO3(24)/BUN(39)/Cr(1.77)Glu(196)/Ca(8.8)/Mg(2.00)/PO4(3.1)    10-11 @ 14:15    Urinalysis Basic - ( 13 Oct 2023 13:50 )    Color: x / Appearance: x / SG: x / pH: x  Gluc: 182 mg/dL / Ketone: x  / Bili: x / Urobili: x   Blood: x / Protein: x / Nitrite: x   Leuk Esterase: x / RBC: x / WBC x   Sq Epi: x / Non Sq Epi: x / Bacteria: x            ASSESSMENT/PLAN  IMPRESSION: 73F w/ HTN, DM2, CVA, breast CA-bilateral mastectomy, recurrent aspiration pneumonia/respiratory failure, and CKD, 8/11/23 p/w acute hypercapnic respiratory failure; c/b RUSS    (1)Renal - RUSS on CKD4 ==>now newly ESRD. UF yesterday, due for HD today. Potentially for tunneled cath with IR, timing TBD    (2)Hyponatremia - improved/stable as of late with HD    (3)Hypokalemia/Hypophosphatemia -  improving of late on Glucerna    (4)CV- tenuous hemodynamics such with each passing week we have more difficulty performing HD/achieving UF. Provided that we continue with aggressive life-sustaining therapy, at some point soon we will need to have her on pressor gtts to allow her to tolerate HD    (5)Pulm- vent-dependent    RECOMMEND:  (1)HD today - 3hrs, 2kg UF as able, pressors and sodium modelling as needed to keep SBP>90; Epogen with HD  (2)Tunneled cath placement if/when clinically optimized for the procedure  (3)Dose new meds for GFR <10/HD        Nas Corona NP-BC  [employer]  [phone]

## 2023-10-14 NOTE — PROGRESS NOTE ADULT - SUBJECTIVE AND OBJECTIVE BOX
CHIEF COMPLAINT: Patient is a 73y old  Female who presents with a chief complaint of Respiratory distress (13 Oct 2023 19:25)      Interval Events:    REVIEW OF SYSTEMS:  [ ] All other systems negative  [ ] Unable to assess ROS because ________    Mode: AC/ CMV (Assist Control/ Continuous Mandatory Ventilation), RR (machine): 24, FiO2: 35, PEEP: 8, ITime: 0.8, MAP: 21, PC: 40, PIP: 48  Arterial Blood Gas:  10-13 @ 17:00  7.34/36/186/19/99.5/-5.8  ABG lactate: --  Arterial Blood Gas:  10-13 @ 13:50  7.27/63/46/29/78.3/0.5  ABG lactate: --      OBJECTIVE:  ICU Vital Signs Last 24 Hrs  T(C): 37.9 (14 Oct 2023 05:04), Max: 38.6 (13 Oct 2023 22:01)  T(F): 100.2 (14 Oct 2023 05:04), Max: 101.5 (13 Oct 2023 22:01)  HR: 110 (14 Oct 2023 08:13) (109 - 121)  BP: 111/53 (14 Oct 2023 05:04) (100/55 - 140/57)  BP(mean): 79 (13 Oct 2023 17:09) (70 - 80)  ABP: --  ABP(mean): --  RR: 19 (14 Oct 2023 05:04) (19 - 23)  SpO2: 100% (14 Oct 2023 08:13) (96% - 100%)    O2 Parameters below as of 14 Oct 2023 05:04  Patient On (Oxygen Delivery Method): ventilator    O2 Concentration (%): 35      Mode: AC/ CMV (Assist Control/ Continuous Mandatory Ventilation), RR (machine): 24, FiO2: 35, PEEP: 8, ITime: 0.8, MAP: 21, PC: 40, PIP: 48    10-13 @ 07:01  -  10-14 @ 07:00  --------------------------------------------------------  IN: 880 mL / OUT: 2400 mL / NET: -1520 mL      CAPILLARY BLOOD GLUCOSE      POCT Blood Glucose.: 260 mg/dL (14 Oct 2023 06:34)      HOSPITAL MEDICATIONS:  MEDICATIONS  (STANDING):  albuterol    0.083% 2.5 milliGRAM(s) Nebulizer every 6 hours  apixaban 5 milliGRAM(s) Oral every 12 hours  artificial tears (preservative free) Ophthalmic Solution 1 Drop(s) Both EYES every 4 hours  atorvastatin 10 milliGRAM(s) Oral at bedtime  chlorhexidine 0.12% Liquid 15 milliLiter(s) Oral Mucosa every 12 hours  chlorhexidine 2% Cloths 1 Application(s) Topical daily  dextrose 5%. 1000 milliLiter(s) (50 mL/Hr) IV Continuous <Continuous>  dextrose 5%. 1000 milliLiter(s) (100 mL/Hr) IV Continuous <Continuous>  dextrose 50% Injectable 12.5 Gram(s) IV Push once  dextrose 50% Injectable 25 Gram(s) IV Push once  dextrose 50% Injectable 25 Gram(s) IV Push once  dextrose 50% Injectable 25 Gram(s) IV Push once  epoetin odalis (EPOGEN) Injectable 84889 Unit(s) IV Push <User Schedule>  ferrous    sulfate Liquid 300 milliGRAM(s) Oral daily  glucagon  Injectable 1 milliGRAM(s) IntraMuscular once  insulin lispro (ADMELOG) corrective regimen sliding scale   SubCutaneous every 6 hours  insulin NPH human recombinant 6 Unit(s) SubCutaneous every 6 hours  levETIRAcetam  Solution 1000 milliGRAM(s) Oral daily  levETIRAcetam  Solution 250 milliGRAM(s) Oral <User Schedule>  midodrine 10 milliGRAM(s) Oral once  midodrine. 30 milliGRAM(s) Oral every 8 hours  pantoprazole  Injectable 40 milliGRAM(s) IV Push two times a day  petrolatum Ophthalmic Ointment 1 Application(s) Both EYES four times a day  polymyxin B IVPB 640849 Unit(s) IV Intermittent every 12 hours  sodium chloride 1 Gram(s) Oral two times a day    MEDICATIONS  (PRN):  acetaminophen   Oral Liquid .. 650 milliGRAM(s) Oral every 6 hours PRN Temp greater or equal to 38C (100.4F), Mild Pain (1 - 3)  calamine/zinc oxide Lotion 1 Application(s) Topical two times a day PRN Rash and/or Itching  dextrose Oral Gel 15 Gram(s) Oral once PRN Blood Glucose LESS THAN 70 milliGRAM(s)/deciliter  sodium chloride 0.9% lock flush 10 milliLiter(s) IV Push every 1 hour PRN Pre/post blood products, medications, blood draw, and to maintain line patency      LABS:                        7.6    13.95 )-----------( 382      ( 13 Oct 2023 17:00 )             24.3     10-13    131<L>  |  95<L>  |  36<H>  ----------------------------<  182<H>  4.9   |  26  |  1.69<H>    Ca    8.3<L>      13 Oct 2023 13:50  Phos  3.4     10-13  Mg     2.10     10-13        Urinalysis Basic - ( 13 Oct 2023 13:50 )    Color: x / Appearance: x / SG: x / pH: x  Gluc: 182 mg/dL / Ketone: x  / Bili: x / Urobili: x   Blood: x / Protein: x / Nitrite: x   Leuk Esterase: x / RBC: x / WBC x   Sq Epi: x / Non Sq Epi: x / Bacteria: x      Arterial Blood Gas:  10-13 @ 17:00  7.34/36/186/19/99.5/-5.8  ABG lactate: --  Arterial Blood Gas:  10-13 @ 13:50  7.27/63/46/29/78.3/0.5  ABG lactate: --        PAST MEDICAL & SURGICAL HISTORY:  Breast CA      Diabetes      Stroke      Cardiac arrest      HTN (hypertension)      H/O mastectomy, bilateral          FAMILY HISTORY:  No pertinent family history in first degree relatives        Social History:      RADIOLOGY:  [ ] Reviewed and interpreted by me    PULMONARY FUNCTION TESTS:    EKG: CHIEF COMPLAINT: Patient is a 73y old  Female who presents with a chief complaint of Respiratory distress.      Interval Events: Febrile overnight to 101.5; tylenol given and c/w antibiotics.      REVIEW OF SYSTEMS:  [x] Unable to assess ROS because nonverbal and vented.      Mode: AC/ CMV (Assist Control/ Continuous Mandatory Ventilation), RR (machine): 24, FiO2: 35, PEEP: 8, ITime: 0.8, MAP: 21, PC: 40, PIP: 48  Arterial Blood Gas:  10-13 @ 17:00  7.34/36/186/19/99.5/-5.8  ABG lactate: --  Arterial Blood Gas:  10-13 @ 13:50  7.27/63/46/29/78.3/0.5  ABG lactate: --      OBJECTIVE:  ICU Vital Signs Last 24 Hrs  T(C): 37.9 (14 Oct 2023 05:04), Max: 38.6 (13 Oct 2023 22:01)  T(F): 100.2 (14 Oct 2023 05:04), Max: 101.5 (13 Oct 2023 22:01)  HR: 110 (14 Oct 2023 08:13) (109 - 121)  BP: 111/53 (14 Oct 2023 05:04) (100/55 - 140/57)  BP(mean): 79 (13 Oct 2023 17:09) (70 - 80)  RR: 19 (14 Oct 2023 05:04) (19 - 23)  SpO2: 100% (14 Oct 2023 08:13) (96% - 100%)    O2 Parameters below as of 14 Oct 2023 05:04  Patient On (Oxygen Delivery Method): ventilator    O2 Concentration (%): 35      Mode: AC/ CMV (Assist Control/ Continuous Mandatory Ventilation), RR (machine): 24, FiO2: 35, PEEP: 8, ITime: 0.8, MAP: 21, PC: 40, PIP: 48    10-13 @ 07:01  -  10-14 @ 07:00  --------------------------------------------------------  IN: 880 mL / OUT: 2400 mL / NET: -1520 mL      CAPILLARY BLOOD GLUCOSE      POCT Blood Glucose.: 260 mg/dL (14 Oct 2023 06:34)      HOSPITAL MEDICATIONS:  MEDICATIONS  (STANDING):  albuterol    0.083% 2.5 milliGRAM(s) Nebulizer every 6 hours  apixaban 5 milliGRAM(s) Oral every 12 hours  artificial tears (preservative free) Ophthalmic Solution 1 Drop(s) Both EYES every 4 hours  atorvastatin 10 milliGRAM(s) Oral at bedtime  chlorhexidine 0.12% Liquid 15 milliLiter(s) Oral Mucosa every 12 hours  chlorhexidine 2% Cloths 1 Application(s) Topical daily  dextrose 5%. 1000 milliLiter(s) (50 mL/Hr) IV Continuous <Continuous>  dextrose 5%. 1000 milliLiter(s) (100 mL/Hr) IV Continuous <Continuous>  dextrose 50% Injectable 12.5 Gram(s) IV Push once  dextrose 50% Injectable 25 Gram(s) IV Push once  dextrose 50% Injectable 25 Gram(s) IV Push once  dextrose 50% Injectable 25 Gram(s) IV Push once  epoetin odalis (EPOGEN) Injectable 13122 Unit(s) IV Push <User Schedule>  ferrous    sulfate Liquid 300 milliGRAM(s) Oral daily  glucagon  Injectable 1 milliGRAM(s) IntraMuscular once  insulin lispro (ADMELOG) corrective regimen sliding scale   SubCutaneous every 6 hours  insulin NPH human recombinant 6 Unit(s) SubCutaneous every 6 hours  levETIRAcetam  Solution 1000 milliGRAM(s) Oral daily  levETIRAcetam  Solution 250 milliGRAM(s) Oral <User Schedule>  midodrine 10 milliGRAM(s) Oral once  midodrine. 30 milliGRAM(s) Oral every 8 hours  pantoprazole  Injectable 40 milliGRAM(s) IV Push two times a day  petrolatum Ophthalmic Ointment 1 Application(s) Both EYES four times a day  polymyxin B IVPB 087846 Unit(s) IV Intermittent every 12 hours  sodium chloride 1 Gram(s) Oral two times a day    MEDICATIONS  (PRN):  acetaminophen   Oral Liquid .. 650 milliGRAM(s) Oral every 6 hours PRN Temp greater or equal to 38C (100.4F), Mild Pain (1 - 3)  calamine/zinc oxide Lotion 1 Application(s) Topical two times a day PRN Rash and/or Itching  dextrose Oral Gel 15 Gram(s) Oral once PRN Blood Glucose LESS THAN 70 milliGRAM(s)/deciliter  sodium chloride 0.9% lock flush 10 milliLiter(s) IV Push every 1 hour PRN Pre/post blood products, medications, blood draw, and to maintain line patency      LABS:                        7.6    13.95 )-----------( 382      ( 13 Oct 2023 17:00 )             24.3     10-13    131<L>  |  95<L>  |  36<H>  ----------------------------<  182<H>  4.9   |  26  |  1.69<H>    Ca    8.3<L>      13 Oct 2023 13:50  Phos  3.4     10-13  Mg     2.10     10-13      Urinalysis Basic - ( 13 Oct 2023 13:50 )      Color: x / Appearance: x / SG: x / pH: x  Gluc: 182 mg/dL / Ketone: x  / Bili: x / Urobili: x   Blood: x / Protein: x / Nitrite: x   Leuk Esterase: x / RBC: x / WBC x   Sq Epi: x / Non Sq Epi: x / Bacteria: x      Arterial Blood Gas:  10-13 @ 17:00  7.34/36/186/19/99.5/-5.8  ABG lactate: --  Arterial Blood Gas:  10-13 @ 13:50  7.27/63/46/29/78.3/0.5  ABG lactate: --      PAST MEDICAL & SURGICAL HISTORY:  Breast CA      Diabetes      Stroke      Cardiac arrest      HTN (hypertension)      H/O mastectomy, bilateral      FAMILY HISTORY:  No pertinent family history in first degree relatives        Social History:      RADIOLOGY:  [ ] Reviewed and interpreted by me    PULMONARY FUNCTION TESTS:    EKG:

## 2023-10-14 NOTE — PROGRESS NOTE ADULT - PROBLEM SELECTOR PLAN 1
Multifactorial     - Aspiration, acute on chronic diastolic CHF   s/p trach 9/1  now with likely sepsis   restarted Abx  Prognosis very poor. Discussed with  at bedside room air

## 2023-10-14 NOTE — PROGRESS NOTE ADULT - NS ATTEND AMEND GEN_ALL_CORE FT
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 ESRD s/p ICU and intubation. Course further c/b GIB s/p EGD, new R cerebellar infarct, and pneumonia/bacteremia, and prolonged respiratory failure require tracheostomy.       #NEURO  Previous episodes of possible seizure, EEG with possible focal seizure with RUE twitching and at risk of focal onset seizures from multiple locations, especially left posterior temporal/posterocentral region, likely due to encephalomalacia/gliosis from prior left parietal CVA. Also with underlying encephalopathy due to acute illnes + RUSS + sepsis + history of CVA's (MICU course complicated by new R cerebellar, midbrain, and multiple tiny embolic infarcts as well as known left PCA CVA). Repeat CT head on 10/3 with no acute changes. Ammonia normal. BUN improved. Continue vEEG monitoring.  - Started on levetiracetam.   - no evidence of seizure today, remains unresponsive, when stable will send for MRI  - dc asa while on apixaban    #Renal  -HD with fluid removal per renal. Hold HD if hypotensive, midodrine to be given prior - will discuss with nephrology, may benefit from extra HD days for additional fluid removal   - continues to be grossly fluid overloaded on exam   - stable H/H. Anemia of CKD, continue Epoetin per nephrology.     #ID  - Repeat sputum culture with numerous carbapenem-resistant Pseudomonas aeruginosa. However, she is allergic to PCNs and cephalosporins. Appreciate ID follow-up, currently on Polymyxin B. Appreciate ENT follow-up for left ear otitis externa, which has now resolved. Follow-up ophthalmology regarding keratitis + chemosis, on Lacrilube and artificial tears.   - ongoing sepsis with no better antibiotic options given allergies and resistance profile  - appreciate ID input    #CVS  - hypotensive, Continue midodrine 30 mg q8h.  -  Continue aspirin and statin given h/o CVA  - On apixaban for DVT.      #Pulm   - Continue lung protective ventilation. On 10/11 Very high peak pressures on VC - PIP 65 on ,   RR 22, FiO2 35 - inadequate volumes, repositoined as diaphragmatic compression worsened high PIP - when lowered legs, head of bed improved PIP. Switched back to PC 45/8, with volumes of ~310-320 cc. VBG appreciated  - post fluid removal by HD pressures are improved, titrated down PC top 40/8 with volumes ~320 cc, continue to adjust as tolerated  - Airway clearance therapy with albuterol  - Bloody secretions noted upon suction on 10/9- FiO2 requirements unchanged. Will discontinue hypertonic saline, continue albuterol. Monitor closely, if develops antonina hemoptysis start TXA nebs 500 mg q8h standing for 72 hrs  - no further hemoptysis today     #GI  tube feeds as tolerates. PPI for GI ppx.  - check LFTs    #GOALS OF CARE   Goals of care meeting with daughter and , BETTINA Candelario and Nurse Manager present on 10/9/ We discussed Ms. Barraza's current clinical condition detailing management above. All questions from family answered. I informed family that despite all our efforts she is extremely ill and at risk for further deterioration. They understand and wish to pursue all possible medical interventions. She will remain full code. 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 ESRD s/p ICU and intubation. Course further c/b GIB s/p EGD, new R cerebellar infarct, and pneumonia/bacteremia, and prolonged respiratory failure require tracheostomy.     #NEURO  Previous episodes of possible seizure, EEG with possible focal seizure with RUE twitching and at risk of focal onset seizures from multiple locations, especially left posterior temporal/posterocentral region, likely due to encephalomalacia/gliosis from prior left parietal CVA. Also with underlying encephalopathy due to acute illnes + RUSS + sepsis + history of CVA's (MICU course complicated by new R cerebellar, midbrain, and multiple tiny embolic infarcts as well as known left PCA CVA). Repeat CT head on 10/3 with no acute changes. Ammonia normal. BUN improved. Continue vEEG monitoring.  - Started on levetiracetam.   - no evidence of seizure but remains unresponsive, when stable will send for MRI  - off asa while on apixaban    #Renal  -HD with fluid removal per renal. Hold HD if hypotensive, midodrine to be given prior - will discuss with nephrology, may benefit from extra HD days for additional fluid removal    #Heme- stable H/H. Anemia of CKD, continue Epoetin per nephrology.     #ID (allergies to pcn/cephalos)  - Repeat sputum culture with numerous carbapenem-resistant Pseudomonas aeruginosa. However, she is allergic to PCNs and cephalosporins. Appreciate ID follow-up, currently on Polymyxin B. Appreciate ENT follow-up for left ear otitis externa-resolved. Follow-up ophthalmology regarding keratitis + chemosis, on Lacrilube and artificial tears.   - ongoing sepsis with no better antibiotic options given allergies and resistance profile  - appreciate ID input    #CVS  - hypotensive, Continue midodrine 30 mg q8h.  -  Continue off ASA (on eliquis) and statin given h/o CVA  - On apixaban for DVT.      #Pulm   - Continue lung protective ventilation. On 10/11 Very high peak pressures on VC - PIP 65 on ,   RR 22, FiO2 35 - inadequate volumes, repositioned as diaphragmatic compression worsened high PIP - when lowered legs, head of bed improved PIP. Switched back to PC 45/8, with volumes of ~310-320 cc. VBG appreciated  - post fluid removal by HD pressures are improved, titrated down PC top 40/8 with volumes ~320 cc, continue to adjust as tolerated  - Airway clearance therapy with albuterol  - Bloody secretions noted upon suction on 10/9- FiO2 requirements unchanged. Off hypertonic saline, continue albuterol. Monitor closely, if develops antonina hemoptysis start TXA nebs 500 mg q8h standing for 72 hrs  - no further hemoptysis over few days    Trach--placed by Deshaun-- concerned about positioning--may need ENT evaln to adjust?    #GI  tube feeds as tolerates. PPI for GI ppx.     - f/up LFTs    #GOALS OF CARE   Goals of care meeting with daughter and , BETTIAN Candelario and Nurse Manager present on 10/9 They discussed Ms. Barraza's current clinical condition detailing management above. All questions from family answered. The family was informed that despite all our efforts she is extremely ill and at risk for further deterioration. They understand and wish to pursue all possible medical interventions. She will remain full code.    Travis Stratton MD-Pulmonary   [phone]

## 2023-10-14 NOTE — PROGRESS NOTE ADULT - ASSESSMENT
72 y/o F well known to me from my house\[facility] [place] [facility]\"" outpt practice. she was admitted at [facility] 7/12-7/22 w aspiration PNA, was treated w CEFEPIME, developed an allergic rash,  dCHF, + MAC on AFB culture, had been progressively getting more and more lethargic and dyspneic at home since DC.   In  am of 8/11/23  ptn presented with respiratory distress w hypoxia and hypercarbia requiring intubation 2/2 volume overload +/- Asp PNA      Neuro   not responsive  Baseline MS AOx3, aphasic   - h/o CVA , on aspirin and statin . resumed w feeding tube, ASA resumed 8/26  - eeg  2/2 tremors, no sz focus, on KEPPRA  - less responsive in the past few days  - MRI 8/17:  new R cerebellar infarct, old left PCA/Occipital infarct. probably embolic in nature, did not tolerate full AC in the past, STEPHANIE is neg , no shunts observed  - ptn is poorly reactive, rpt scan done, no further CVA seen.     Cardiac   cardiology following  CHFpEF   TTE 7/2023 with EF 59%, with severe LVH and diastolic dysfunction       Pulmonary   Acute hypercapnic and hypoxemic respiratory failure   prolonged intubation, now  trache to  vent, has copious secretions      Renal   on HD via L IJ Shiley, awaiting tunneled catheter when cleared by RCU team  HD MWF, midodrine assisted, PUF in between HD days, unable to remove proper volume 2/2 hypotension.   permacath when clinically stable      GI  NPO  on tube feeds  coffee ground emesis x 1 8/24, melena overnight 8/31, s/p 1UPRBC, EGD/Colonoscopy: erosive gastropathy, esophagisits, on colonoscopy old blood seen no active bleeding, poor prep  family to decide re PEG placement    Endocrine  on Insulin: NPH, Admelog    ID   complicated infectious hospital course  treated for MSSA bacteremia, aspiration PNA.  sputum + for pseudomonas, ptn was initially on zosyn but was allergic, then was switched to aztreonam   Aztreonam was switched to polymyxin for a possible allergy but ptn didnt have a reaction to aztreonam, she had a reaction to Zosyn.   would change polymyxin back to aztreonam as ptn is spiking temps through polymyxin.    P. aeruginosa PNA. presently on Polymyxin B , was supposed to complete the course of Abx on 10/12 but started spiking temps again    ID course complicated with muliple ABx allergies  also treated for Left O. 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 and she remains full code at present

## 2023-10-14 NOTE — PROVIDER CONTACT NOTE (CRITICAL VALUE NOTIFICATION) - NAME OF MD/NP/PA/DO NOTIFIED:
Jacinda thrasher
Maria De Jesus Jauregui
Rudi DUNBAR
Ruthie FELDER
Dr. Lynn
Vianney Joe
Jacinda Shelby
Alexey Candelario
BETTINA Blas
Jacinda Shelby
Maria De Jesus DUNBAR
Rajan DUNBAR

## 2023-10-14 NOTE — PROGRESS NOTE ADULT - RESPIRATORY COMMENTS
Coarse lung sounds bilaterally
Rhonchi bilaterally
Coarse lung sounds bilaterally

## 2023-10-14 NOTE — PROGRESS NOTE ADULT - SUBJECTIVE AND OBJECTIVE BOX
Patient is a 73y old  Female who presents with a chief complaint of Respiratory distress (14 Oct 2023 20:09)      SUBJECTIVE / OVERNIGHT EVENTS: ptn is lethargic, on HD MWF, w PUF between HD days since cannot tolerate full HD sessions 2/2 hypotension despite Midodrine. off Abx, febrile again, on Polymyxin.     MEDICATIONS  (STANDING):  albuterol    0.083% 2.5 milliGRAM(s) Nebulizer every 6 hours  apixaban 5 milliGRAM(s) Oral every 12 hours  artificial tears (preservative free) Ophthalmic Solution 1 Drop(s) Both EYES every 4 hours  atorvastatin 10 milliGRAM(s) Oral at bedtime  chlorhexidine 0.12% Liquid 15 milliLiter(s) Oral Mucosa every 12 hours  chlorhexidine 2% Cloths 1 Application(s) Topical daily  dextrose 5%. 1000 milliLiter(s) (50 mL/Hr) IV Continuous <Continuous>  dextrose 5%. 1000 milliLiter(s) (100 mL/Hr) IV Continuous <Continuous>  dextrose 50% Injectable 12.5 Gram(s) IV Push once  dextrose 50% Injectable 25 Gram(s) IV Push once  dextrose 50% Injectable 25 Gram(s) IV Push once  dextrose 50% Injectable 25 Gram(s) IV Push once  epoetin odalis (EPOGEN) Injectable 08405 Unit(s) IV Push <User Schedule>  ferrous    sulfate Liquid 300 milliGRAM(s) Oral daily  glucagon  Injectable 1 milliGRAM(s) IntraMuscular once  insulin lispro (ADMELOG) corrective regimen sliding scale   SubCutaneous every 6 hours  insulin NPH human recombinant 6 Unit(s) SubCutaneous every 6 hours  levETIRAcetam  Solution 1000 milliGRAM(s) Oral daily  levETIRAcetam  Solution 250 milliGRAM(s) Oral <User Schedule>  midodrine 10 milliGRAM(s) Oral once  midodrine. 30 milliGRAM(s) Oral every 8 hours  pantoprazole  Injectable 40 milliGRAM(s) IV Push two times a day  petrolatum Ophthalmic Ointment 1 Application(s) Both EYES four times a day  polymyxin B IVPB 940235 Unit(s) IV Intermittent every 12 hours  sodium chloride 1 Gram(s) Oral two times a day    MEDICATIONS  (PRN):  acetaminophen   Oral Liquid .. 650 milliGRAM(s) Oral every 6 hours PRN Temp greater or equal to 38C (100.4F), Mild Pain (1 - 3)  calamine/zinc oxide Lotion 1 Application(s) Topical two times a day PRN Rash and/or Itching  dextrose Oral Gel 15 Gram(s) Oral once PRN Blood Glucose LESS THAN 70 milliGRAM(s)/deciliter  sodium chloride 0.9% lock flush 10 milliLiter(s) IV Push every 1 hour PRN Pre/post blood products, medications, blood draw, and to maintain line patency      Vital Signs Last 24 Hrs  T(F): 98.9 (10-14-23 @ 20:32), Max: 101.5 (10-13-23 @ 22:01)  HR: 101 (10-14-23 @ 20:32) (100 - 121)  BP: 119/51 (10-14-23 @ 20:32) (99/56 - 119/51)  RR: 18 (10-14-23 @ 20:32) (18 - 20)  SpO2: 100% (10-14-23 @ 20:32) (94% - 100%)  Telemetry:   CAPILLARY BLOOD GLUCOSE      POCT Blood Glucose.: 233 mg/dL (14 Oct 2023 17:37)  POCT Blood Glucose.: 235 mg/dL (14 Oct 2023 13:12)  POCT Blood Glucose.: 203 mg/dL (14 Oct 2023 11:21)  POCT Blood Glucose.: 260 mg/dL (14 Oct 2023 06:34)  POCT Blood Glucose.: 244 mg/dL (13 Oct 2023 23:52)    I&O's Summary    13 Oct 2023 07:01  -  14 Oct 2023 07:00  --------------------------------------------------------  IN: 880 mL / OUT: 2400 mL / NET: -1520 mL    14 Oct 2023 07:01  -  14 Oct 2023 21:39  --------------------------------------------------------  IN: 880 mL / OUT: 1515 mL / NET: -635 mL        PHYSICAL EXAM:  GENERAL: NAD, well-developed  HEAD:  Atraumatic, Normocephalic  EYES: EOMI, PERRLA, conjunctiva and sclera clear  NECK: Supple, No JVD  CHEST/LUNG: Clear to auscultation bilaterally; No wheeze  HEART: Regular rate and rhythm; No murmurs, rubs, or gallops  ABDOMEN: Soft, Nontender, Nondistended; Bowel sounds present  EXTREMITIES:  2+ Peripheral Pulses, No clubbing, cyanosis, or edema  PSYCH: AAOx3  NEUROLOGY: non-focal  SKIN: No rashes or lesions    LABS:                        7.6    13.95 )-----------( 382      ( 13 Oct 2023 17:00 )             24.3     10-14    132<L>  |  102  |  24<H>  ----------------------------<  216<H>  3.8   |  25  |  1.29    Ca    6.7<L>      14 Oct 2023 21:02  Phos  2.5     10-14  Mg     1.80     10-14            Urinalysis Basic - ( 14 Oct 2023 21:02 )    Color: x / Appearance: x / SG: x / pH: x  Gluc: 216 mg/dL / Ketone: x  / Bili: x / Urobili: x   Blood: x / Protein: x / Nitrite: x   Leuk Esterase: x / RBC: x / WBC x   Sq Epi: x / Non Sq Epi: x / Bacteria: x        RADIOLOGY & ADDITIONAL TESTS:    Imaging Personally Reviewed:    Consultant(s) Notes Reviewed:      Care Discussed with Consultants/Other Providers:

## 2023-10-14 NOTE — PROVIDER CONTACT NOTE (CRITICAL VALUE NOTIFICATION) - ACTION/TREATMENT ORDERED:
Repeat CBC
STAT repeat CBC and type and screen ordered.
Half unit over 3 hours then another half unit over 3 hours
Provider made aware of result, ordered to continue monitoring
1 unit PRBCs ordered
Awaiting response
0.5 unit prbc ordered, type and screen pending
Provided notified. Ordered 1 unit PRBC
Will continue to monitor Pt and vital signs, provider will order blood redraw.
pending
PA requested to redraw another CBC

## 2023-10-15 NOTE — PROGRESS NOTE ADULT - CARDIOVASCULAR
regular rate and rhythm
normal/regular rate and rhythm/S1 S2 present/no gallops/no rub/no murmur
regular rate and rhythm/tachycardia
normal/regular rate and rhythm/S1 S2 present/no gallops/no rub/no murmur
regular rate and rhythm
normal/regular rate and rhythm/S1 S2 present/no gallops/no rub/no murmur
regular rate and rhythm
regular rate and rhythm
normal/regular rate and rhythm/S1 S2 present/no gallops/no rub/no murmur
normal/regular rate and rhythm/S1 S2 present/no gallops/no rub/no murmur
regular rate and rhythm
normal/regular rate and rhythm/S1 S2 present/no gallops/no rub/no murmur
regular rate and rhythm

## 2023-10-15 NOTE — PROGRESS NOTE ADULT - GASTROINTESTINAL
soft/nontender/distended
soft
soft/nontender
normal/soft/nontender/nondistended/normal active bowel sounds
normal/soft/nontender/nondistended/normal active bowel sounds
soft
soft
soft/nontender/distended
normal/soft/nontender/nondistended/normal active bowel sounds
soft/nontender
normal/soft/nontender/nondistended/normal active bowel sounds
soft
soft/nontender/distended
soft/nontender/distended
soft
soft/nontender/normal active bowel sounds/distended
soft/nontender/distended
soft
soft

## 2023-10-15 NOTE — PROGRESS NOTE ADULT - ENMT COMMENTS
Trach C/D/I; NG tube in place
Trach C/D/I
Trach with thick white secretions seen in suction tubing
Trach C/D/I
NG tube in place; Trach C/D/I
Trach C/D/I

## 2023-10-15 NOTE — PROGRESS NOTE ADULT - NS ATTEND AMEND GEN_ALL_CORE FT
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 ESRD s/p ICU and intubation. Course further c/b GIB s/p EGD, new R cerebellar infarct, and pneumonia/bacteremia, and prolonged respiratory failure require tracheostomy.     #NEURO  Previous episodes of possible seizure, EEG with possible focal seizure with RUE twitching and at risk of focal onset seizures from multiple locations, especially left posterior temporal/posterocentral region, likely due to encephalomalacia/gliosis from prior left parietal CVA. Also with underlying encephalopathy due to acute illnes + RUSS + sepsis + history of CVA's (MICU course complicated by new R cerebellar, midbrain, and multiple tiny embolic infarcts as well as known left PCA CVA). Repeat CT head on 10/3 with no acute changes. Ammonia normal. BUN improved. Continue vEEG monitoring.  - Started on levetiracetam.   - no evidence of seizure but remains unresponsive, when stable will send for MRI  - off asa while on apixaban    #Renal  -HD with fluid removal per renal. Hold HD if hypotensive, midodrine to be given prior - will discuss with nephrology, may benefit from extra HD days for additional fluid removal    #Heme- stable H/H. Anemia of CKD, continue Epoetin per nephrology.     #ID (allergies to pcn/cephalos)  - Repeat sputum culture with numerous carbapenem-resistant Pseudomonas aeruginosa. However, she is allergic to PCNs and cephalosporins. Appreciate ID follow-up, currently on Polymyxin B. Appreciate ENT follow-up for left ear otitis externa-resolved. Follow-up ophthalmology regarding keratitis + chemosis, on Lacrilube and artificial tears.   - ongoing sepsis with no better antibiotic options given allergies and resistance profile  - appreciate ID input    #CVS  - hypotensive, Continue midodrine 30 mg q8h.  -  Continue off ASA (on eliquis) and statin given h/o CVA  - On apixaban for DVT.      #Pulm   - Continue lung protective ventilation. On 10/11 Very high peak pressures on VC - PIP 65 on ,   RR 22, FiO2 35 - inadequate volumes, repositioned as diaphragmatic compression worsened high PIP - when lowered legs, head of bed improved PIP. Switched back to PC 45/8, with volumes of ~310-320 cc. VBG appreciated  - post fluid removal by HD pressures are improved, titrated down PC top 40/8 with volumes ~320 cc, continue to adjust as tolerated  - Airway clearance therapy with albuterol  - Bloody secretions noted upon suction on 10/9- FiO2 requirements unchanged. Off hypertonic saline, continue albuterol. Monitor closely, if develops antonina hemoptysis start TXA nebs 500 mg q8h standing for 72 hrs  - no further hemoptysis over few days    Trach--placed by Deshaun-- concerned about positioning--may need ENT evaln to adjust?    #GI  tube feeds as tolerates. PPI for GI ppx.     - f/up LFTs    #GOALS OF CARE   Goals of care meeting with daughter and , BETTINA Candelario and Nurse Manager present on 10/9 They discussed Ms. Barraza's current clinical condition detailing management above. All questions from family answered. The family was informed that despite all our efforts she is extremely ill and at risk for further deterioration. They understand and wish to pursue all possible medical interventions. She will remain full code.    Travis Stratton MD-Pulmonary   [phone]

## 2023-10-15 NOTE — PROGRESS NOTE ADULT - ASSESSMENT
72 [facility]/12-7/22 w aspiration PNA, was treated w CEFEPIME, developed an allergic rash,  dCHF, + MAC on AFB culture, had been progressively getting more and more lethargic and dyspneic at home since DC.   In  am of 8/11/23  ptn presented with respiratory distress w hypoxia and hypercarbia requiring intubation 2/2 volume overload +/- Asp PNA      Neuro   not responsive  Baseline MS AOx3, aphasic   - h/o CVA , on aspirin and statin . resumed w feeding tube, ASA resumed 8/26  - eeg  2/2 tremors, no sz focus, on KEPPRA  - less responsive in the past few days  - MRI 8/17:  new R cerebellar infarct, old left PCA/Occipital infarct. probably embolic in nature, did not tolerate full AC in the past, STEPHANIE is neg , no shunts observed  - ptn is poorly reactive, rpt scan done, no further CVA seen.     Cardiac   cardiology following  CHFpEF   TTE 7/2023 with EF 59%, with severe LVH and diastolic dysfunction       Pulmonary   Acute hypercapnic and hypoxemic respiratory failure   prolonged intubation, now  trache to  vent, has copious secretions      Renal   on HD via L IJ Shiley, awaiting tunneled catheter when cleared by RCU team  HD MWF, midodrine assisted, PUF in between HD days, unable to remove proper volume 2/2 hypotension.   permacath when clinically stable      GI  NPO  on tube feeds  coffee ground emesis x 1 8/24, melena overnight 8/31, s/p 1UPRBC, EGD/Colonoscopy: erosive gastropathy, esophagisits, on colonoscopy old blood seen no active bleeding, poor prep  family to decide re PEG placement    Endocrine  on Insulin: NPH, Admelog    ID   complicated infectious hospital course  treated for MSSA bacteremia, aspiration PNA.  sputum + for pseudomonas, ptn was initially on zosyn but was allergic, then was switched to aztreonam   Aztreonam was switched to polymyxin for a possible allergy but ptn didnt have a reaction to aztreonam, she had a reaction to Zosyn.   would change polymyxin back to aztreonam as ptn is spiking temps through polymyxin. asked ID to re-evaluate    P. aeruginosa PNA. presently on Polymyxin B , was supposed to complete the course of Abx on 10/12 but started spiking temps again 10/14    ID course complicated with muliple ABx allergies  also treated for Left O. 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 and she remains full code at present

## 2023-10-15 NOTE — PROGRESS NOTE ADULT - ASSESSMENT
seen and examined earlier in AM  trached to vent  s/p HD yesterday 2L net fluid loss  NAD   Next HD Tuesday    acetaminophen   Oral Liquid .. 650 milliGRAM(s) Oral every 6 hours PRN  albuterol    0.083% 2.5 milliGRAM(s) Nebulizer every 6 hours  apixaban 5 milliGRAM(s) Oral every 12 hours  artificial tears (preservative free) Ophthalmic Solution 1 Drop(s) Both EYES every 4 hours  atorvastatin 10 milliGRAM(s) Oral at bedtime  chlorhexidine 0.12% Liquid 15 milliLiter(s) Oral Mucosa every 12 hours  chlorhexidine 2% Cloths 1 Application(s) Topical daily  dextrose 5%. 1000 milliLiter(s) IV Continuous <Continuous>  dextrose 5%. 1000 milliLiter(s) IV Continuous <Continuous>  dextrose 50% Injectable 12.5 Gram(s) IV Push once  dextrose 50% Injectable 25 Gram(s) IV Push once  dextrose 50% Injectable 25 Gram(s) IV Push once  dextrose 50% Injectable 25 Gram(s) IV Push once  dextrose Oral Gel 15 Gram(s) Oral once PRN  diphenhydrAMINE Injectable 12.5 milliGRAM(s) IV Push once  epoetin odalis (EPOGEN) Injectable 50375 Unit(s) IV Push <User Schedule>  ferrous    sulfate Liquid 300 milliGRAM(s) Oral daily  glucagon  Injectable 1 milliGRAM(s) IntraMuscular once  insulin lispro (ADMELOG) corrective regimen sliding scale   SubCutaneous every 6 hours  insulin NPH human recombinant 6 Unit(s) SubCutaneous every 6 hours  levETIRAcetam  Solution 1000 milliGRAM(s) Oral daily  levETIRAcetam  Solution 250 milliGRAM(s) Oral <User Schedule>  midodrine 10 milliGRAM(s) Oral once  midodrine. 30 milliGRAM(s) Oral every 8 hours  pantoprazole  Injectable 40 milliGRAM(s) IV Push two times a day  petrolatum Ophthalmic Ointment 1 Application(s) Both EYES four times a day  sodium chloride 1 Gram(s) Oral two times a day  sodium chloride 0.9% lock flush 10 milliLiter(s) IV Push every 1 hour PRN      VITAL:  T(C): , Max: 38 (10-15-23 @ 17:30)  T(F): , Max: 100.4 (10-15-23 @ 17:30)  HR: 110 (10-15-23 @ 17:30)  BP: 110/54 (10-15-23 @ 17:30)  BP(mean): --  RR: 19 (10-15-23 @ 17:30)  SpO2: 97% (10-15-23 @ 17:30)  Wt(kg): --    10-14-23 @ 07:01  -  10-15-23 @ 07:00  --------------------------------------------------------  IN: 1660 mL / OUT: 1515 mL / NET: 145 mL    10-15-23 @ 07:01  -  10-15-23 @ 21:06  --------------------------------------------------------  IN: 480 mL / OUT: 0 mL / NET: 480 mL        PHYSICAL EXAM:  Constitutional: ill appearing, trached to vent  HEENT: trach-vent, (+)NGT  Respiratory: coarse BS b/l  Cardiovascular: tachy reg s1s2  Gastrointestinal: BS+, soft, NT/ND  Extremities: + peripheral edema  Neurological: tone WNL  Skin: No rashes  Access: (+)[facility]    LABS:                          6.8    14.00 )-----------( 394      ( 14 Oct 2023 21:02 )             22.1     Na(132)/K(3.8)/Cl(102)/HCO3(25)/BUN(24)/Cr(1.29)Glu(216)/Ca(6.7)/Mg(1.80)/PO4(2.5)    10-14 @ 21:02  Na(131)/K(4.9)/Cl(95)/HCO3(26)/BUN(36)/Cr(1.69)Glu(182)/Ca(8.3)/Mg(2.10)/PO4(3.4)    10-13 @ 13:50    Urinalysis Basic - ( 14 Oct 2023 21:02 )    Color: x / Appearance: x / SG: x / pH: x  Gluc: 216 mg/dL / Ketone: x  / Bili: x / Urobili: x   Blood: x / Protein: x / Nitrite: x   Leuk Esterase: x / RBC: x / WBC x   Sq Epi: x / Non Sq Epi: x / Bacteria: x              IMPRESSION: 73F w/ HTN, DM2, CVA, breast CA-bilateral mastectomy, recurrent aspiration pneumonia/respiratory failure, and CKD, 8/11/23 p/w acute hypercapnic respiratory failure; c/b RUSS    (1)Renal - RUSS on CKD4 ==>now newly ESRD. s/p HD saturday with 2L net fluid loss . Next HD Tuesday. Potentially for tunneled cath with IR, timing TBD    (2)Hyponatremia - improved/stable as of late with HD    (3)Hypokalemia/Hypophosphatemia -  improving of late on Glucerna    (4)Anemia- hgb low warranting prbcs  earlier this AM    (5)CV- tenuous hemodynamics such with each passing week we have more difficulty performing HD/achieving UF. Provided that we continue with aggressive life-sustaining therapy, at some point soon we will need to have her on pressor gtts to allow her to tolerate HD    (6)Pulm- vent-dependent    RECOMMEND:  (1)Next HD Tuesday.  Pressors and sodium modelling as needed to keep SBP>90; Epogen with HD  (2)a/w PRBCS per primary team  (3)Tunneled cath placement if/when clinically optimized for the procedure  (4)Dose new meds for GFR <10/HD          Nas Corona NP-BC  [employer]  [phone]

## 2023-10-15 NOTE — PROGRESS NOTE ADULT - SKIN
warm and dry
warm and dry/wound
warm and dry

## 2023-10-15 NOTE — PROGRESS NOTE ADULT - SUBJECTIVE AND OBJECTIVE BOX
Subjective: Patient seen and examined. No new events except as noted.   drop in Hgb  s/p 1/2 PRBC transfusion   REVIEW OF SYSTEMS:  Unable to obtain      MEDICATIONS:  MEDICATIONS  (STANDING):  albuterol    0.083% 2.5 milliGRAM(s) Nebulizer every 6 hours  apixaban 5 milliGRAM(s) Oral every 12 hours  artificial tears (preservative free) Ophthalmic Solution 1 Drop(s) Both EYES every 4 hours  atorvastatin 10 milliGRAM(s) Oral at bedtime  chlorhexidine 0.12% Liquid 15 milliLiter(s) Oral Mucosa every 12 hours  chlorhexidine 2% Cloths 1 Application(s) Topical daily  dextrose 5%. 1000 milliLiter(s) (50 mL/Hr) IV Continuous <Continuous>  dextrose 5%. 1000 milliLiter(s) (100 mL/Hr) IV Continuous <Continuous>  dextrose 50% Injectable 12.5 Gram(s) IV Push once  dextrose 50% Injectable 25 Gram(s) IV Push once  dextrose 50% Injectable 25 Gram(s) IV Push once  dextrose 50% Injectable 25 Gram(s) IV Push once  epoetin odalis (EPOGEN) Injectable 69612 Unit(s) IV Push <User Schedule>  ferrous    sulfate Liquid 300 milliGRAM(s) Oral daily  glucagon  Injectable 1 milliGRAM(s) IntraMuscular once  insulin lispro (ADMELOG) corrective regimen sliding scale   SubCutaneous every 6 hours  insulin NPH human recombinant 6 Unit(s) SubCutaneous every 6 hours  levETIRAcetam  Solution 1000 milliGRAM(s) Oral daily  levETIRAcetam  Solution 250 milliGRAM(s) Oral <User Schedule>  midodrine 10 milliGRAM(s) Oral once  midodrine. 30 milliGRAM(s) Oral every 8 hours  pantoprazole  Injectable 40 milliGRAM(s) IV Push two times a day  petrolatum Ophthalmic Ointment 1 Application(s) Both EYES four times a day  polymyxin B IVPB 164853 Unit(s) IV Intermittent every 12 hours  sodium chloride 1 Gram(s) Oral two times a day      PHYSICAL EXAM:  T(C): 36.7 (10-15-23 @ 06:07), Max: 37.8 (10-14-23 @ 10:00)  HR: 108 (10-15-23 @ 07:53) (100 - 121)  BP: 103/39 (10-15-23 @ 06:07) (92/35 - 119/51)  RR: 18 (10-15-23 @ 06:07) (18 - 20)  SpO2: 99% (10-15-23 @ 07:53) (94% - 100%)  Wt(kg): --  I&O's Summary    14 Oct 2023 07:01  -  15 Oct 2023 07:00  --------------------------------------------------------  IN: 1660 mL / OUT: 1515 mL / NET: 145 mL          Appearance: NAD, +trach  HEENT: dry oral mucosa  Lymphatic: No lymphadenopathy  Cardiovascular: Normal S1 S2, No JVD, No murmurs, No edema  Respiratory: Decreased BS, + trach to vent	  Neuro: opens eyes  Gastrointestinal: Soft, Non-tender, + BS, + NGT  Skin: No rashes, No ecchymoses, No cyanosis	  Extremities: No strength/ROM 2/2 sedation, + BL LE edema  Vascular: Peripheral pulses palpable 2+ bilaterally      Mode: AC/ CMV (Assist Control/ Continuous Mandatory Ventilation), RR (machine): 20, TV (machine): 340, FiO2: 35, PEEP: 8, ITime: 0.8, MAP: 19, PC: 38, PIP: 46          LABS:    CARDIAC MARKERS:                                6.8    14.00 )-----------( 394      ( 14 Oct 2023 21:02 )             22.1     10-14    132<L>  |  102  |  24<H>  ----------------------------<  216<H>  3.8   |  25  |  1.29    Ca    6.7<L>      14 Oct 2023 21:02  Phos  2.5     10-14  Mg     1.80     10-14      proBNP:   Lipid Profile:   HgA1c:   TSH:             TELEMETRY: 	    ECG:  	  RADIOLOGY:   DIAGNOSTIC TESTING:  [ ] Echocardiogram:  [ ]  Catheterization:  [ ] Stress Test:    OTHER:

## 2023-10-15 NOTE — PROGRESS NOTE ADULT - CONSTITUTIONAL
no distress
no distress
intermitent inc WOB
obese
no distress
normal/well-groomed/no distress
well-groomed/no distress
normal/well-groomed/no distress
well-groomed/no distress/obese
well-groomed/obese
no distress
no distress/obese
well-groomed/no distress/obese
no distress
normal/well-groomed/no distress
no distress

## 2023-10-15 NOTE — PROGRESS NOTE ADULT - SUBJECTIVE AND OBJECTIVE BOX
Patient is a 73y old  Female who presents with a chief complaint of Respiratory distress (15 Oct 2023 09:32)      SUBJECTIVE / OVERNIGHT EVENTS: afebrile, remains on polymyxin. asked ID to re-evaluate    MEDICATIONS  (STANDING):  albuterol    0.083% 2.5 milliGRAM(s) Nebulizer every 6 hours  apixaban 5 milliGRAM(s) Oral every 12 hours  artificial tears (preservative free) Ophthalmic Solution 1 Drop(s) Both EYES every 4 hours  atorvastatin 10 milliGRAM(s) Oral at bedtime  chlorhexidine 0.12% Liquid 15 milliLiter(s) Oral Mucosa every 12 hours  chlorhexidine 2% Cloths 1 Application(s) Topical daily  dextrose 5%. 1000 milliLiter(s) (50 mL/Hr) IV Continuous <Continuous>  dextrose 5%. 1000 milliLiter(s) (100 mL/Hr) IV Continuous <Continuous>  dextrose 50% Injectable 12.5 Gram(s) IV Push once  dextrose 50% Injectable 25 Gram(s) IV Push once  dextrose 50% Injectable 25 Gram(s) IV Push once  dextrose 50% Injectable 25 Gram(s) IV Push once  epoetin odalis (EPOGEN) Injectable 16833 Unit(s) IV Push <User Schedule>  ferrous    sulfate Liquid 300 milliGRAM(s) Oral daily  glucagon  Injectable 1 milliGRAM(s) IntraMuscular once  insulin lispro (ADMELOG) corrective regimen sliding scale   SubCutaneous every 6 hours  insulin NPH human recombinant 6 Unit(s) SubCutaneous every 6 hours  levETIRAcetam  Solution 1000 milliGRAM(s) Oral daily  levETIRAcetam  Solution 250 milliGRAM(s) Oral <User Schedule>  midodrine 10 milliGRAM(s) Oral once  midodrine. 30 milliGRAM(s) Oral every 8 hours  pantoprazole  Injectable 40 milliGRAM(s) IV Push two times a day  petrolatum Ophthalmic Ointment 1 Application(s) Both EYES four times a day  polymyxin B IVPB [id number] Unit(s) IV Intermittent every 12 hours  sodium chloride 1 Gram(s) Oral two times a day    MEDICATIONS  (PRN):  acetaminophen   Oral Liquid .. 650 milliGRAM(s) Oral every 6 hours PRN Temp greater or equal to 38C (100.4F), Mild Pain (1 - 3)  calamine/zinc oxide Lotion 1 Application(s) Topical two times a day PRN Rash and/or Itching  dextrose Oral Gel 15 Gram(s) Oral once PRN Blood Glucose LESS THAN 70 milliGRAM(s)/deciliter  sodium chloride 0.9% lock flush 10 milliLiter(s) IV Push every 1 hour PRN Pre/post blood products, medications, blood draw, and to maintain line patency      Vital Signs Last 24 Hrs  T(F): 99.1 (10-15-23 @ 08:00), Max: 99.9 (10-14-23 @ 13:00)  HR: 110 (10-15-23 @ 11:06) (100 - 114)  BP: 115/56 (10-15-23 @ 08:00) (92/35 - 119/51)  RR: 18 (10-15-23 @ 08:00) (18 - 20)  SpO2: 100% (10-15-23 @ 11:06) (94% - 100%)  Telemetry:   CAPILLARY BLOOD GLUCOSE      POCT Blood Glucose.: 248 mg/dL (15 Oct 2023 11:24)  POCT Blood Glucose.: 191 mg/dL (15 Oct 2023 05:53)  POCT Blood Glucose.: 198 mg/dL (14 Oct 2023 23:12)  POCT Blood Glucose.: 233 mg/dL (14 Oct 2023 17:37)  POCT Blood Glucose.: 235 mg/dL (14 Oct 2023 13:12)    I&O's Summary    14 Oct 2023 07:01  -  15 Oct 2023 07:00  --------------------------------------------------------  IN: 1660 mL / OUT: 1515 mL / NET: 145 mL        PHYSICAL EXAM:  GENERAL: NAD, well-developed  HEAD:  Atraumatic, Normocephalic  EYES: EOMI, PERRLA, conjunctiva and sclera clear  NECK: Supple, No JVD  CHEST/LUNG: Clear to auscultation bilaterally; No wheeze  HEART: Regular rate and rhythm; No murmurs, rubs, or gallops  ABDOMEN: Soft, Nontender, Nondistended; Bowel sounds present  EXTREMITIES:  2+ Peripheral Pulses, No clubbing, cyanosis, or edema  PSYCH: AAOx3  NEUROLOGY: non-focal  SKIN: No rashes or lesions    LABS:                        6.8    14.00 )-----------( 394      ( 14 Oct 2023 21:02 )             22.1     10-14    132<L>  |  102  |  24<H>  ----------------------------<  216<H>  3.8   |  25  |  1.29    Ca    6.7<L>      14 Oct 2023 21:02  Phos  2.5     10-14  Mg     1.80     10-14            Urinalysis Basic - ( 14 Oct 2023 21:02 )    Color: x / Appearance: x / SG: x / pH: x  Gluc: 216 mg/dL / Ketone: x  / Bili: x / Urobili: x   Blood: x / Protein: x / Nitrite: x   Leuk Esterase: x / RBC: x / WBC x   Sq Epi: x / Non Sq Epi: x / Bacteria: x        RADIOLOGY & ADDITIONAL TESTS:    Imaging Personally Reviewed:    Consultant(s) Notes Reviewed:      Care Discussed with Consultants/Other Providers:

## 2023-10-15 NOTE — PROGRESS NOTE ADULT - ASSESSMENT
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 While in MICU patient noted with worsening renal failure requiring HD initiation, CGE/ Melena s/p EGD 8/31 found with esophagitis and erosive gastropathy, new right cerebellar infarct and fevers second to ESBL COLI and MSSA VAP, MSSA bacteremia, left ear otitis externa and drug rxn to cephalosporins. 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 Case discussed at length renal and given good UOP attempted renal recovery, however failed, and upon reinitiation of HD attempt. R IJ SHILEY failed. Attempted volume removal with Bumex GTT however course complicated by hypotension requiring transfer back to MICU 9/26 for pressor support. Diuresis dc'ed, pressors weaned off and stress steroids started. L IJ SHILEY placed (9/30) and HD reinstated. Course further complicated by AOCD and  PSA and Proteus VAP. Patient transferred back to RCU 10/1 with course complicated by volume overload and grossly resistant organisms.    NEUROLOGY  # NEW cerebella infarct   - Baseline MS AOX3 with short term memory changes per family however noted with concern for poor mentation in ICU  - MRI (8/17) with NEW right cerebellar infarct and old left PCA/occipital infarcts  - STEPHANIE (8/17) with AV showing two linear mobile echogenic elements attached to valve leaflets consistent with Lambel's excrescence, but no TRINITY thrombus, and lambel's excrescence with uncertain clinical implication.   - EEG (8/11-8/15) with no seizures   - ILR interrogation (9/7) with SR and PACs falsely recorded as AF.   - Continue on ASA and lipitor for medical management   - DC ASA per neuro - on Eliquis again     # Seizures   - Course complicated by questionable head and body shaking with prolactin elevated 9/8. Discussed for spot EEG however held given low likelihood of seizures noted and acute respiratory illnesses   - Course further complicated with right arm twitching and RPT EEG (10/4) with no seizure however but noted with increase seizure potential in left posterior quadrants.   - RPT EEG (10/5) with possible focal seizures and risk of focal-onset seizures from multiple locations, most prominent in the left posterior temporal/posterocentral region  - RPT EEG (10/6) with RUE twitching are likely non-epileptic in nature, however risk of focal-onset seizures from multiple locations  - Continue on Keppra PPX     # Metabolic vs Uremic Encephalopathy   - Lethargy noted with progression to stupor thought to be second to uremia.   - RPT CTH (9/26) with vague areas of hypoattenuation within the bilateral cerebellar hemispheres which may be compatible with acute/subacute ischemia.   - Discussed CTH with neurology and no signs of new infarct noted.   - HD reinstated in ICU with improvement in uremia however mentation remained   - Poor mentation likely in setting of toxic encephalopathy in setting of infections.     CARDIOVASCULAR  # HTN Urgency   # Septic vs vasoplegic shock   - Labile BPs ranging in between SBP 80s-200s and attempted labetalol, however noted with hypotension and bradycardia likely from BB toxicity vs shock state?    - Holding Labetalol, Catapres and Catapres.    - Attempted volume removal with bumex diuresis however noted with return of shock state requiring pressor support and transfer back to ICU.   - Pressors weaned off to stress dose (last day 10/4) and Midodrine   - Continue on Midodrine 30 TID (9/29 - ) and ensure timed 30-60 minutes prior to HD  - DO NOT HOLD MIDODRINE   - BP improved s/p albumin     # Hx of HFpEF with likely ADHF with volume overload.   - ECHO (7/2023) with EF 59 with severe LVH and diastolic dysfunction   - STEPHANIE (8/18) with normal LVRVSF however noted with increased LA pressures and persistent LA septal bowing into RA.   - ECHO (8/11) with EF 60 with mild LVH, normal LVRVSF, and mild ddfxn.  - Grossly volume overloaded and removing volume with HD midodrine assisted sessions     HEENT   # Left ear OE with acute on chronic left-sided otomastoiditis  - ENT evaluation (9/7) with no mastoid tenderness, however found with positive swelling and excoriation to left auricle and tenderness to manipulation of auricle. Debrided crusting of auricle and EAC evaluated with edema and unable to visualize TM. EAC debrided and found with watery exudate.   - CT IAC with acute on chronic left-sided otitis externa and acute on chronic left-sided otomastoiditis. Superimposed left-sided tympanosclerosis. Superimposed cholesteatoma formation within the left tympanomastoid cavity cannot be excluded  - Completed CiproHC (9/5 - 9/16), Bradford (9/1-10/1) and acetic acid and bacitracin.   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   - Continue on empiric valtrex 500mg BID (9/29 - 10/8) per ophtho and ID  - Continue preservative free artificial tears 4 times a day in the both eye  - Continue lacrilube ointment twice a day in the both eyes   - Ophthalmology reconsulted (10/12) given L-eye injection     # Oral Lesions with questionable Zoster vs Trauma?   - Patient with tongue pain and seen with anterior ulcerations consolidating and crusting and posterior ulceration.  - Case discussed with pathology resident and culture/ cytology sent.   - HSV negative and Path (9/6) with normal appearing epithelial cells singly and in clusters devoid of viral cytopathic effect.   - Continue on magic mouth wash for pain    RESPIRATORY  # AHHRF second to volume overload   - Hx of CKD and HFpEF presented with SOB and hypercarbia second to volume overload requiring intubation and HD initiation   - Vent weaning attempted however limited requiring tracheostomy (9/1) with IP   - Course complicated by PIPs elevated and found with tracheomalacia and volume overloaded.   - CT CHEST (9/8) with tracheomalacia, BL pleural effusions and continued consolidations   - Continue on vent and given TBM increased PEEP to 8 with improvement in dynamic collapse, but PIP waxes and wanes with volume overloaded and secretions.  - Continue on albuterol and chest PT  - Continue volume removal with HD   - Attempted HTS but noted with hemoptysis and held   - Hold Atrovent given thick secretions   - Hold IPV given high inspiratory pressures   - Vent changed to PS with improvement - VBG good   - Had inc wob /hypoxic poor volumes today placed back on volume control but pressures increasing pt required positioning to help with volumes - back on pressure control with better volumes /dec pressure   - Rpeat ABG done -ok      GI  # UGIB   - CGE x 1 episode on 8/24 and melena x 2 episodes on 8/31 likely residual blood.   - EGD (8/31) found with esophagitis and erosive gastropathy  - Continue on PPI BID through late October and then PPI QD     # Dysphagia   - NGT-TF continued   - Pending PEG however needs improvement prior to placement.     RENAL  # Progressive CKD   - Presented volume overload and progressive RUSS on CKD   - POCUS with no signs of hydronephrosis in ICU  - Pressor support started with improvement in renal function in ICU  - Attempted steroid course in ICU without improvement in renal function   - Case discussed at length with renal and initiated on HD in ICU   - Remain on HD while in RCU however noted to be volume overloaded. Attempted Bumex pushes and patient noted with good UOP.   - Attempted renal recovery in RCU however noted with decent UOP, but BUN/ CRE continued to rise without improvement on diuresis.   - Ultimately resumed on HD MWF TIW with midodrine assisted volume removal, however pressure remains soft with difficulty removing aggressive fluids.   - D/w Renal for additional UF session (10/12)    # Hyponatremia   - Continue on salt tabs 1G  (decreased 10/6) and weaning off as tolerated with volume removal   - Monitor sodium     # Hyperphosphatemia to Hypophosphatemia with HD  - PTH normal and elevated phos likely from renal failure  - Phos binder with Renvela started with improvement however then noted with hypophosphatemia and Renvela stopped.   - required replacment KPHOS today with good response - continue to monitor     INFECTIOUS DISEASE  # ESBL ECOLI and MSSA VAP c/b MSSA bacteremia   - RVP/ COVID (8/11) negative   - SCx (8/11) with MSSA s/p cefepime (8/12-8/13) and then ancef (8/14) however noted with drug reaction and then changed to vanco and aztreonam (8/12-8/13) in ICU .   - Course complicated by recurrent fevers and return of MSSA with bacteremia.   - SCx (9/1) with MSSA and ESBL ECOLI   - BAL (9/1) with MSSA   - BCx (9/1) with MSSA and cleared on (9/4)   - ECHO (9/6) unable to rule out endocarditis   - Continue on Vanco (9/4-9/22) however noted with rashes of uncertain etiology and replaced with Bradford (9/4 - 10/2) and DAPTO (9/26-10/2) for  completion.     # Proteus and  PSA VAP/ Trachitis   - Continued fevers and SCx (9/29) with MDR  PSA and Proteus   - Continued on Bradford as above however noted with resistance and changed to Zosyn (10/2 - 10/5) with course complicated by possible drug rxn. Zosyn changed to Aztreonam (10/5 - 10/6) however RPT SCx (10/3) with  PSA however only intermediate coverage to aztreonam and amikacin.  PSA grossly resistant to other antibiotics other than cephalosporins however patient with reactions in the past. Case discussed with ID and ID pharmacist and change to Polymyxin (10/6 - ) however continues to spike fevers likely second to Polymyxin only intermediate coverage and Recarbio resistant .   - Overall difficulty with ABX tailoring given allergic rxns and resistant organisms.   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  - Monitor HH     VASCULAR   # LLE POP DVT   - US BL LE (9/10) with acute left deep vein thrombosis of the left popliteal vein.  - RUE swollen and VA DOPPLER RUE (10/3) with R IJ DVT and R brachial DVT.  - Eliquis held for permacath however procedure held.   - Eliquis remains on hold second to mild suction trauma hemoptysis.   - Resumed Eliquis     # HD access  - L IJ CLAUDIA (9/27 - )   - Planned for IR permacath cancelled for today and will reattempt when infectious status improves.     ONC   # Hx of Breast CA   - Patient dx in 2018 and s/p BL mastectomy (radical on right) and chemo and RT.   - Supportive care.     ENDOCRINE  # IDDM2   - Continued on NPH with ISS, however noted with hypoglycemic episodes and NPH dc'ed.    - Finger sticks trending up off NPH.   - Continue on NPH 6U with moderate ISS.   - Adjust as need     SKIN  # Drug Eruption   - Attempted cefepime (8/12) vs ancef (8/13-8/14) and then noted with drug rxn in ICU. Continue on steroids with prednisone 40 (8/18 - 9/2) then prednisone 30 (9/3-9/7), then prednisone 20 (9/8 - 9/10), then prednisone 10mg (9/11-9/13) then turn off.   - Attempted Zosyn (10/2-10/5) with possible rash. Zosyn turned off with improvement.   - Hold further cephalosporins or PCNs at this time   - Monitor for further drug rxns.     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 While in MICU patient noted with worsening renal failure requiring HD initiation, CGE/ Melena s/p EGD 8/31 found with esophagitis and erosive gastropathy, new right cerebellar infarct and fevers second to ESBL COLI and MSSA VAP, MSSA bacteremia, left ear otitis externa and drug rxn to cephalosporins. 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 Case discussed at length renal and given good UOP attempted renal recovery, however failed, and upon reinitiation of HD attempt. R IJ SHILEY failed. Attempted volume removal with Bumex GTT however course complicated by hypotension requiring transfer back to MICU 9/26 for pressor support. Diuresis dc'ed, pressors weaned off and stress steroids started. L IJ SHILEY placed (9/30) and HD reinstated. Course further complicated by AOCD and  PSA and Proteus VAP. Patient transferred back to RCU 10/1 with course complicated by volume overload and grossly resistant organisms.    NEUROLOGY  # NEW cerebella infarct   - Baseline MS AOX3 with short term memory changes per family however noted with concern for poor mentation in ICU  - MRI (8/17) with NEW right cerebellar infarct and old left PCA/occipital infarcts  - STEPHANIE (8/17) with AV showing two linear mobile echogenic elements attached to valve leaflets consistent with Lambel's excrescence, but no TRINITY thrombus, and lambel's excrescence with uncertain clinical implication.   - EEG (8/11-8/15) with no seizures   - ILR interrogation (9/7) with SR and PACs falsely recorded as AF.   - Continue on ASA and lipitor for medical management   - DC ASA per neuro - on Eliquis again     # Seizures   - Course complicated by questionable head and body shaking with prolactin elevated 9/8. Discussed for spot EEG however held given low likelihood of seizures noted and acute respiratory illnesses   - Course further complicated with right arm twitching and RPT EEG (10/4) with no seizure however but noted with increase seizure potential in left posterior quadrants.   - RPT EEG (10/5) with possible focal seizures and risk of focal-onset seizures from multiple locations, most prominent in the left posterior temporal/posterocentral region  - RPT EEG (10/6) with RUE twitching are likely non-epileptic in nature, however risk of focal-onset seizures from multiple locations  - Continue on Keppra PPX     # Metabolic vs Uremic Encephalopathy   - Lethargy noted with progression to stupor thought to be second to uremia.   - RPT CTH (9/26) with vague areas of hypoattenuation within the bilateral cerebellar hemispheres which may be compatible with acute/subacute ischemia.   - Discussed CTH with neurology and no signs of new infarct noted.   - HD reinstated in ICU with improvement in uremia however mentation remained   - Poor mentation likely in setting of toxic encephalopathy in setting of infections.     CARDIOVASCULAR  # HTN Urgency   # Septic vs vasoplegic shock   - Labile BPs ranging in between SBP 80s-200s and attempted labetalol, however noted with hypotension and bradycardia likely from BB toxicity vs shock state?    - Holding Labetalol, Catapres and Catapres.    - Attempted volume removal with bumex diuresis however noted with return of shock state requiring pressor support and transfer back to ICU.   - Pressors weaned off to stress dose (last day 10/4) and Midodrine   - Continue on Midodrine 30 TID (9/29 - ) and ensure timed 30-60 minutes prior to HD  - DO NOT HOLD MIDODRINE   - BP improved s/p albumin     # Hx of HFpEF with likely ADHF with volume overload.   - ECHO (7/2023) with EF 59 with severe LVH and diastolic dysfunction   - STEPHANIE (8/18) with normal LVRVSF however noted with increased LA pressures and persistent LA septal bowing into RA.   - ECHO (8/11) with EF 60 with mild LVH, normal LVRVSF, and mild ddfxn.  - Grossly volume overloaded and removing volume with HD midodrine assisted sessions     HEENT   # Left ear OE with acute on chronic left-sided otomastoiditis  - ENT evaluation (9/7) with no mastoid tenderness, however found with positive swelling and excoriation to left auricle and tenderness to manipulation of auricle. Debrided crusting of auricle and EAC evaluated with edema and unable to visualize TM. EAC debrided and found with watery exudate.   - CT IAC with acute on chronic left-sided otitis externa and acute on chronic left-sided otomastoiditis. Superimposed left-sided tympanosclerosis. Superimposed cholesteatoma formation within the left tympanomastoid cavity cannot be excluded  - Completed CiproHC (9/5 - 9/16), Bradford (9/1-10/1) and acetic acid and bacitracin.   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   - Continue on empiric valtrex 500mg BID (9/29 - 10/8) per ophtho and ID  - Continue preservative free artificial tears 4 times a day in the both eye  - Continue lacrilube ointment twice a day in the both eyes   - Ophthalmology reconsulted (10/12) given L-eye injection     # Oral Lesions with questionable Zoster vs Trauma?   - Patient with tongue pain and seen with anterior ulcerations consolidating and crusting and posterior ulceration.  - Case discussed with pathology resident and culture/ cytology sent.   - HSV negative and Path (9/6) with normal appearing epithelial cells singly and in clusters devoid of viral cytopathic effect.   - Continue on magic mouth wash for pain    RESPIRATORY  # AHHRF second to volume overload   - Hx of CKD and HFpEF presented with SOB and hypercarbia second to volume overload requiring intubation and HD initiation   - Vent weaning attempted however limited requiring tracheostomy (9/1) with IP   - Course complicated by PIPs elevated and found with tracheomalacia and volume overloaded.   - CT CHEST (9/8) with tracheomalacia, BL pleural effusions and continued consolidations   - Continue on vent and given TBM increased PEEP to 8 with improvement in dynamic collapse, but PIP waxes and wanes with volume overloaded and secretions.  - Continue on albuterol and chest PT  - Continue volume removal with HD   - Attempted HTS but noted with hemoptysis and held   - Hold Atrovent given thick secretions   - Hold IPV given high inspiratory pressures   - Vent changed to PS with improvement - VBG good   - Had inc wob /hypoxic poor volumes today placed back on volume control but pressures increasing pt required positioning to help with volumes - back on pressure control with better volumes /dec pressure   - Rpeat ABG done -ok      GI  # UGIB   - CGE x 1 episode on 8/24 and melena x 2 episodes on 8/31 likely residual blood.   - EGD (8/31) found with esophagitis and erosive gastropathy  - Continue on PPI BID through late October and then PPI QD     # Dysphagia   - NGT-TF continued   - Pending PEG however needs improvement prior to placement.     RENAL  # Progressive CKD   - Presented volume overload and progressive RUSS on CKD   - POCUS with no signs of hydronephrosis in ICU  - Pressor support started with improvement in renal function in ICU  - Attempted steroid course in ICU without improvement in renal function   - Case discussed at length with renal and initiated on HD in ICU   - Remain on HD while in RCU however noted to be volume overloaded. Attempted Bumex pushes and patient noted with good UOP.   - Attempted renal recovery in RCU however noted with decent UOP, but BUN/ CRE continued to rise without improvement on diuresis.   - Ultimately resumed on HD MWF TIW with midodrine assisted volume removal, however pressure remains soft with difficulty removing aggressive fluids.   - D/w Renal for additional UF session (10/12)    # Hyponatremia   - Continue on salt tabs 1G  (decreased 10/6) and weaning off as tolerated with volume removal   - Monitor sodium     # Hyperphosphatemia to Hypophosphatemia with HD  - PTH normal and elevated phos likely from renal failure  - Phos binder with Renvela started with improvement however then noted with hypophosphatemia and Renvela stopped.   - required replacment KPHOS today with good response - continue to monitor     INFECTIOUS DISEASE  # ESBL ECOLI and MSSA VAP c/b MSSA bacteremia   - RVP/ COVID (8/11) negative   - SCx (8/11) with MSSA s/p cefepime (8/12-8/13) and then ancef (8/14) however noted with drug reaction and then changed to vanco and aztreonam (8/12-8/13) in ICU .   - Course complicated by recurrent fevers and return of MSSA with bacteremia.   - SCx (9/1) with MSSA and ESBL ECOLI   - BAL (9/1) with MSSA   - BCx (9/1) with MSSA and cleared on (9/4)   - ECHO (9/6) unable to rule out endocarditis   - Continue on Vanco (9/4-9/22) however noted with rashes of uncertain etiology and replaced with Bradford (9/4 - 10/2) and DAPTO (9/26-10/2) for  completion.     # Proteus and  PSA VAP/ Trachitis   - Continued fevers and SCx (9/29) with MDR  PSA and Proteus   - Continued on Bradford as above however noted with resistance and changed to Zosyn (10/2 - 10/5) with course complicated by possible drug rxn. Zosyn changed to Aztreonam (10/5 - 10/6) however RPT SCx (10/3) with  PSA however only intermediate coverage to aztreonam and amikacin.  PSA grossly resistant to other antibiotics other than cephalosporins however patient with reactions in the past. Case discussed with ID and ID pharmacist and change to Polymyxin (10/6 - ) however continues to spike fevers likely second to Polymyxin only intermediate coverage and Recarbio resistant .   - Overall difficulty with ABX tailoring given allergic rxns and resistant organisms.   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  - Monitor HH     VASCULAR   # LLE POP DVT   - US BL LE (9/10) with acute left deep vein thrombosis of the left popliteal vein.  - RUE swollen and VA DOPPLER RUE (10/3) with R IJ DVT and R brachial DVT.  - Eliquis held for permacath however procedure held.   - Eliquis remains on hold second to mild suction trauma hemoptysis.   - Resumed Eliquis     # HD access  - L IJ CLAUDIA (9/27 - )   - Planned for IR permacath cancelled for today and will reattempt when infectious status improves.     ONC   # Hx of Breast CA   - Patient dx in 2018 and s/p BL mastectomy (radical on right) and chemo and RT.   - Supportive care.     ENDOCRINE  # IDDM2   - Continued on NPH with ISS, however noted with hypoglycemic episodes and NPH dc'ed.    - Finger sticks trending up off NPH.   - Continue on NPH 6U with moderate ISS.   - Adjust as need     SKIN  # Drug Eruption   - Attempted cefepime (8/12) vs ancef (8/13-8/14) and then noted with drug rxn in ICU. Continue on steroids with prednisone 40 (8/18 - 9/2) then prednisone 30 (9/3-9/7), then prednisone 20 (9/8 - 9/10), then prednisone 10mg (9/11-9/13) then turn off.   - Attempted Zosyn (10/2-10/5) with possible rash. Zosyn turned off with improvement.   - Hold further cephalosporins or PCNs at this time   - Monitor for further drug rxns.     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 While in MICU patient noted with worsening renal failure requiring HD initiation, CGE/ Melena s/p EGD 8/31 found with esophagitis and erosive gastropathy, new right cerebellar infarct and fevers second to ESBL COLI and MSSA VAP, MSSA bacteremia, left ear otitis externa and drug rxn to cephalosporins. 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 Case discussed at length renal and given good UOP attempted renal recovery, however failed, and upon reinitiation of HD attempt. R IJ SHILEY failed. Attempted volume removal with Bumex GTT however course complicated by hypotension requiring transfer back to MICU 9/26 for pressor support. Diuresis dc'ed, pressors weaned off and stress steroids started. L IJ SHILEY placed (9/30) and HD reinstated. Course further complicated by AOCD and  PSA and Proteus VAP. Patient transferred back to RCU 10/1 with course complicated by volume overload and grossly resistant organisms.    NEUROLOGY  # NEW cerebella infarct   - Baseline MS AOX3 with short term memory changes per family however noted with concern for poor mentation in ICU  - MRI (8/17) with NEW right cerebellar infarct and old left PCA/occipital infarcts  - STEPHANIE (8/17) with AV showing two linear mobile echogenic elements attached to valve leaflets consistent with Lambel's excrescence, but no TRINITY thrombus, and lambel's excrescence with uncertain clinical implication.   - EEG (8/11-8/15) with no seizures   - ILR interrogation (9/7) with SR and PACs falsely recorded as AF.   - Continue on ASA and lipitor for medical management   - DC ASA per neuro - on Eliquis again     # Seizures   - Course complicated by questionable head and body shaking with prolactin elevated 9/8. Discussed for spot EEG however held given low likelihood of seizures noted and acute respiratory illnesses   - Course further complicated with right arm twitching and RPT EEG (10/4) with no seizure however but noted with increase seizure potential in left posterior quadrants.   - RPT EEG (10/5) with possible focal seizures and risk of focal-onset seizures from multiple locations, most prominent in the left posterior temporal/posterocentral region  - RPT EEG (10/6) with RUE twitching are likely non-epileptic in nature, however risk of focal-onset seizures from multiple locations  - Continue on Keppra PPX     # Metabolic vs Uremic Encephalopathy   - Lethargy noted with progression to stupor thought to be second to uremia.   - RPT CTH (9/26) with vague areas of hypoattenuation within the bilateral cerebellar hemispheres which may be compatible with acute/subacute ischemia.   - Discussed CTH with neurology and no signs of new infarct noted.   - HD reinstated in ICU with improvement in uremia however mentation remained   - Poor mentation likely in setting of toxic encephalopathy in setting of infections.     CARDIOVASCULAR  # HTN Urgency   # Septic vs vasoplegic shock   - Labile BPs ranging in between SBP 80s-200s and attempted labetalol, however noted with hypotension and bradycardia likely from BB toxicity vs shock state?    - Holding Labetalol, Catapres and Catapres.    - Attempted volume removal with bumex diuresis however noted with return of shock state requiring pressor support and transfer back to ICU.   - Pressors weaned off to stress dose (last day 10/4) and Midodrine   - Continue on Midodrine 30 TID (9/29 - ) and ensure timed 30-60 minutes prior to HD  - DO NOT HOLD MIDODRINE   - BP improved s/p albumin     # Hx of HFpEF with likely ADHF with volume overload.   - ECHO (7/2023) with EF 59 with severe LVH and diastolic dysfunction   - STEPHANIE (8/18) with normal LVRVSF however noted with increased LA pressures and persistent LA septal bowing into RA.   - ECHO (8/11) with EF 60 with mild LVH, normal LVRVSF, and mild ddfxn.  - Grossly volume overloaded and removing volume with HD midodrine assisted sessions     HEENT   # Left ear OE with acute on chronic left-sided otomastoiditis  - ENT evaluation (9/7) with no mastoid tenderness, however found with positive swelling and excoriation to left auricle and tenderness to manipulation of auricle. Debrided crusting of auricle and EAC evaluated with edema and unable to visualize TM. EAC debrided and found with watery exudate.   - CT IAC with acute on chronic left-sided otitis externa and acute on chronic left-sided otomastoiditis. Superimposed left-sided tympanosclerosis. Superimposed cholesteatoma formation within the left tympanomastoid cavity cannot be excluded  - Completed CiproHC (9/5 - 9/16), Bradford (9/1-10/1) and acetic acid and bacitracin.   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   - Continue on empiric valtrex 500mg BID (9/29 - 10/8) per ophtho and ID  - Continue preservative free artificial tears 4 times a day in the both eye  - Continue lacrilube ointment twice a day in the both eyes   - Ophthalmology reconsulted (10/12) given L-eye injection     # Oral Lesions with questionable Zoster vs Trauma?   - Patient with tongue pain and seen with anterior ulcerations consolidating and crusting and posterior ulceration.  - Case discussed with pathology resident and culture/ cytology sent.   - HSV negative and Path (9/6) with normal appearing epithelial cells singly and in clusters devoid of viral cytopathic effect.   - Continue on magic mouth wash for pain    RESPIRATORY  # AHHRF second to volume overload   - Hx of CKD and HFpEF presented with SOB and hypercarbia second to volume overload requiring intubation and HD initiation   - Vent weaning attempted however limited requiring tracheostomy (9/1) with IP   - Course complicated by PIPs elevated and found with tracheomalacia and volume overloaded.   - CT CHEST (9/8) with tracheomalacia, BL pleural effusions and continued consolidations   - Continue on vent and given TBM increased PEEP to 8 with improvement in dynamic collapse, but PIP waxes and wanes with volume overloaded and secretions.  - Continue on albuterol and chest PT  - Continue volume removal with HD   - Attempted HTS but noted with hemoptysis and held   - Hold Atrovent given thick secretions   - Hold IPV given high inspiratory pressures   - Vent changed to PS with improvement - VBG good   - Had inc wob /hypoxic poor volumes today placed back on volume control but pressures increasing pt required positioning to help with volumes - back on pressure control with better volumes /dec pressure   - Rpeat ABG done -ok      GI  # UGIB   - CGE x 1 episode on 8/24 and melena x 2 episodes on 8/31 likely residual blood.   - EGD (8/31) found with esophagitis and erosive gastropathy  - Continue on PPI BID through late October and then PPI QD     # Dysphagia   - NGT-TF continued   - Pending PEG however needs improvement prior to placement.     RENAL  # Progressive CKD   - Presented volume overload and progressive RUSS on CKD   - POCUS with no signs of hydronephrosis in ICU  - Pressor support started with improvement in renal function in ICU  - Attempted steroid course in ICU without improvement in renal function   - Case discussed at length with renal and initiated on HD in ICU   - Remain on HD while in RCU however noted to be volume overloaded. Attempted Bumex pushes and patient noted with good UOP.   - Attempted renal recovery in RCU however noted with decent UOP, but BUN/ CRE continued to rise without improvement on diuresis.   - Ultimately resumed on HD MWF TIW with midodrine assisted volume removal, however pressure remains soft with difficulty removing aggressive fluids.   - D/w Renal for additional UF session (10/12)    # Hyponatremia   - Continue on salt tabs 1G  (decreased 10/6) and weaning off as tolerated with volume removal   - Monitor sodium     # Hyperphosphatemia to Hypophosphatemia with HD  - PTH normal and elevated phos likely from renal failure  - Phos binder with Renvela started with improvement however then noted with hypophosphatemia and Renvela stopped.   - required replacment KPHOS today with good response - continue to monitor     INFECTIOUS DISEASE  # ESBL ECOLI and MSSA VAP c/b MSSA bacteremia   - RVP/ COVID (8/11) negative   - SCx (8/11) with MSSA s/p cefepime (8/12-8/13) and then ancef (8/14) however noted with drug reaction and then changed to vanco and aztreonam (8/12-8/13) in ICU .   - Course complicated by recurrent fevers and return of MSSA with bacteremia.   - SCx (9/1) with MSSA and ESBL ECOLI   - BAL (9/1) with MSSA   - BCx (9/1) with MSSA and cleared on (9/4)   - ECHO (9/6) unable to rule out endocarditis   - Continue on Vanco (9/4-9/22) however noted with rashes of uncertain etiology and replaced with Bradford (9/4 - 10/2) and DAPTO (9/26-10/2) for completion.     # Proteus and  PSA VAP/ Trachitis   - Continued fevers and SCx (9/29) with MDR  PSA and Proteus   - Continued on Bradford as above however noted with resistance and changed to Zosyn (10/2 - 10/5) with course complicated by possible drug rxn. Zosyn changed to Aztreonam (10/5 - 10/6) however RPT SCx (10/3) with  PSA however only intermediate coverage to aztreonam and amikacin.  PSA grossly resistant to other antibiotics other than cephalosporins however patient with reactions in the past. Case discussed with ID and ID pharmacist and change to Polymyxin (10/6 - ) however continues to spike fevers likely second to Polymyxin only intermediate coverage and Recarbio resistant .   - Overall difficulty with ABX tailoring given allergic rxns and resistant organisms.   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  - Monitor HH     VASCULAR   # LLE POP DVT   - US BL LE (9/10) with acute left deep vein thrombosis of the left popliteal vein.  - RUE swollen and VA DOPPLER RUE (10/3) with R IJ DVT and R brachial DVT.  - Eliquis held for permacath however procedure held.   - Eliquis remains on hold second to mild suction trauma hemoptysis.   - Resumed Eliquis     # HD access  - L IJ CLAUDIA (9/27 - )   - Planned for IR permacath cancelled for today and will reattempt when infectious status improves.     ONC   # Hx of Breast CA   - Patient dx in 2018 and s/p BL mastectomy (radical on right) and chemo and RT.   - Supportive care.     ENDOCRINE  # IDDM2   - Continued on NPH with ISS, however noted with hypoglycemic episodes and NPH dc'ed.    - Finger sticks trending up off NPH.   - Continue on NPH 6U with moderate ISS.   - Adjust as need     SKIN  # Drug Eruption   - Attempted cefepime (8/12) vs ancef (8/13-8/14) and then noted with drug rxn in ICU. Continue on steroids with prednisone 40 (8/18 - 9/2) then prednisone 30 (9/3-9/7), then prednisone 20 (9/8 - 9/10), then prednisone 10mg (9/11-9/13) then turn off.   - Attempted Zosyn (10/2-10/5) with possible rash. Zosyn turned off with improvement.   - Hold further cephalosporins or PCNs at this time   - Monitor for further drug rxns.     ETHICS/ GOC    - Attempted palliative discussion however family not interested and wishes for FULL CODE    DISPO - TBD   ************************  10/14-complaints about trach (as per )  10/15-no new evants-as per  better day

## 2023-10-15 NOTE — PROGRESS NOTE ADULT - ADDITIONAL PE
Opens eyes with neck flexed back  Follows simple commands  LIZZIE in upper extremities. RLE weakness- baseline. LLE limited movement
A&Ox0  Doesn't follow any commands
A&Ox0  Doesn't follow any commands  L eye taped with gauze
A&Ox0  Opens eyes at times but doesn't track
Opens eyes at times but eyes don't remain open and doesn't track  Noted to shake head and open mouth at times- repetitive movements  Doesn't follow commands but noted to move arms at times
A&Ox0  Doesn't follow commands
Opens eyes but doesn't follow commands
Opens eyes at times but doesn't follow any commands
Doesn't follow any commands  Ng tube in place
Opens eyes at times but doesn't track  Follows simple commands at times- squeezing finger- weakly
Opens eyes at times but noted to be more lethargic today  Follows simple commands  LIZZIE in upper extremities. RLE weakness- baseline. LLE limited movement
A&Ox0  Doesn't follow any commands

## 2023-10-15 NOTE — PROGRESS NOTE ADULT - SUBJECTIVE AND OBJECTIVE BOX
CHIEF COMPLAINT: Patient is a 73y old Female who presents with a chief complaint of Respiratory distress.      Interval Events:      REVIEW OF SYSTEMS:  [ ] All other systems negative  [ ] Unable to assess ROS because ________      Mode: AC/ CMV (Assist Control/ Continuous Mandatory Ventilation), RR (machine): 24, FiO2: 35, PEEP: 8, ITime: 0.8, MAP: 21, PC: 40, PIP: 48  Arterial Blood Gas:  10-13 @ 17:00  7.34/36/186/19/99.5/-5.8  ABG lactate: --  Arterial Blood Gas:  10-13 @ 13:50  7.27/63/46/29/78.3/0.5  ABG lactate: --      OBJECTIVE:  ICU Vital Signs Last 24 Hrs  T(C): 36.7 (15 Oct 2023 06:07), Max: 37.8 (14 Oct 2023 10:00)  T(F): 98 (15 Oct 2023 06:07), Max: 100 (14 Oct 2023 10:00)  HR: 112 (15 Oct 2023 06:07) (100 - 121)  BP: 103/39 (15 Oct 2023 06:07) (92/35 - 119/51)  RR: 18 (15 Oct 2023 06:07) (18 - 20)  SpO2: 100% (15 Oct 2023 06:07) (94% - 100%)      O2 Parameters below as of 15 Oct 2023 06:07  Patient On (Oxygen Delivery Method): ventilator      O2 Concentration (%): 35      Mode: AC/ CMV (Assist Control/ Continuous Mandatory Ventilation), RR (machine): 24, FiO2: 35, PEEP: 8, ITime: 0.8, MAP: 21, PC: 40, PIP: 48    10-14 @ 07:01  -  10-15 @ 07:00  --------------------------------------------------------  IN: 1660 mL / OUT: 1515 mL / NET: 145 mL      CAPILLARY BLOOD GLUCOSE      POCT Blood Glucose.: 191 mg/dL (15 Oct 2023 05:53)      HOSPITAL MEDICATIONS:  MEDICATIONS  (STANDING):  albuterol    0.083% 2.5 milliGRAM(s) Nebulizer every 6 hours  apixaban 5 milliGRAM(s) Oral every 12 hours  artificial tears (preservative free) Ophthalmic Solution 1 Drop(s) Both EYES every 4 hours  atorvastatin 10 milliGRAM(s) Oral at bedtime  chlorhexidine 0.12% Liquid 15 milliLiter(s) Oral Mucosa every 12 hours  chlorhexidine 2% Cloths 1 Application(s) Topical daily  dextrose 5%. 1000 milliLiter(s) (50 mL/Hr) IV Continuous <Continuous>  dextrose 5%. 1000 milliLiter(s) (100 mL/Hr) IV Continuous <Continuous>  dextrose 50% Injectable 12.5 Gram(s) IV Push once  dextrose 50% Injectable 25 Gram(s) IV Push once  dextrose 50% Injectable 25 Gram(s) IV Push once  dextrose 50% Injectable 25 Gram(s) IV Push once  epoetin odalis (EPOGEN) Injectable 26411 Unit(s) IV Push <User Schedule>  ferrous    sulfate Liquid 300 milliGRAM(s) Oral daily  glucagon  Injectable 1 milliGRAM(s) IntraMuscular once  insulin lispro (ADMELOG) corrective regimen sliding scale   SubCutaneous every 6 hours  insulin NPH human recombinant 6 Unit(s) SubCutaneous every 6 hours  levETIRAcetam  Solution 1000 milliGRAM(s) Oral daily  levETIRAcetam  Solution 250 milliGRAM(s) Oral <User Schedule>  midodrine 10 milliGRAM(s) Oral once  midodrine. 30 milliGRAM(s) Oral every 8 hours  pantoprazole  Injectable 40 milliGRAM(s) IV Push two times a day  petrolatum Ophthalmic Ointment 1 Application(s) Both EYES four times a day  polymyxin B IVPB 655961 Unit(s) IV Intermittent every 12 hours  sodium chloride 1 Gram(s) Oral two times a day    MEDICATIONS  (PRN):  acetaminophen   Oral Liquid .. 650 milliGRAM(s) Oral every 6 hours PRN Temp greater or equal to 38C (100.4F), Mild Pain (1 - 3)  calamine/zinc oxide Lotion 1 Application(s) Topical two times a day PRN Rash and/or Itching  dextrose Oral Gel 15 Gram(s) Oral once PRN Blood Glucose LESS THAN 70 milliGRAM(s)/deciliter  sodium chloride 0.9% lock flush 10 milliLiter(s) IV Push every 1 hour PRN Pre/post blood products, medications, blood draw, and to maintain line patency      LABS:                        6.8    14.00 )-----------( 394      ( 14 Oct 2023 21:02 )             22.1     10-14    132<L>  |  102  |  24<H>  ----------------------------<  216<H>  3.8   |  25  |  1.29    Ca    6.7<L>      14 Oct 2023 21:02  Phos  2.5     10-14  Mg     1.80     10-14      Urinalysis Basic - ( 14 Oct 2023 21:02 )      Color: x / Appearance: x / SG: x / pH: x  Gluc: 216 mg/dL / Ketone: x  / Bili: x / Urobili: x   Blood: x / Protein: x / Nitrite: x   Leuk Esterase: x / RBC: x / WBC x   Sq Epi: x / Non Sq Epi: x / Bacteria: x      Arterial Blood Gas:  10-13 @ 17:00  7.34/36/186/19/99.5/-5.8  ABG lactate: --  Arterial Blood Gas:  10-13 @ 13:50  7.27/63/46/29/78.3/0.5  ABG lactate: --    Venous Blood Gas:  10-14 @ 21:02  7.34/55/48/30/78.8  VBG Lactate: 1.7      PAST MEDICAL & SURGICAL HISTORY:  Breast CA      Diabetes      Stroke      Cardiac arrest      HTN (hypertension)      H/O mastectomy, bilateral      FAMILY HISTORY:  No pertinent family history in first degree relatives      Social History:      RADIOLOGY:  [ ] Reviewed and interpreted by me    PULMONARY FUNCTION TESTS:    EKG: CHIEF COMPLAINT: Patient is a 73y old Female who presents with a chief complaint of Respiratory distress.      Interval Events: Pt noted to be hypotensive overnight to SBP 80s during HD requiring midodrine. BP stable after midodrine.      REVIEW OF SYSTEMS:  [x] Unable to assess ROS because nonverbal and vented.      Mode: AC/ CMV (Assist Control/ Continuous Mandatory Ventilation), RR (machine): 24, FiO2: 35, PEEP: 8, ITime: 0.8, MAP: 21, PC: 40, PIP: 48  Arterial Blood Gas:  10-13 @ 17:00  7.34/36/186/19/99.5/-5.8  ABG lactate: --  Arterial Blood Gas:  10-13 @ 13:50  7.27/63/46/29/78.3/0.5  ABG lactate: --      OBJECTIVE:  ICU Vital Signs Last 24 Hrs  T(C): 36.7 (15 Oct 2023 06:07), Max: 37.8 (14 Oct 2023 10:00)  T(F): 98 (15 Oct 2023 06:07), Max: 100 (14 Oct 2023 10:00)  HR: 112 (15 Oct 2023 06:07) (100 - 121)  BP: 103/39 (15 Oct 2023 06:07) (92/35 - 119/51)  RR: 18 (15 Oct 2023 06:07) (18 - 20)  SpO2: 100% (15 Oct 2023 06:07) (94% - 100%)      O2 Parameters below as of 15 Oct 2023 06:07  Patient On (Oxygen Delivery Method): ventilator      O2 Concentration (%): 35      Mode: AC/ CMV (Assist Control/ Continuous Mandatory Ventilation), RR (machine): 24, FiO2: 35, PEEP: 8, ITime: 0.8, MAP: 21, PC: 40, PIP: 48    10-14 @ 07:01  -  10-15 @ 07:00  --------------------------------------------------------  IN: 1660 mL / OUT: 1515 mL / NET: 145 mL      CAPILLARY BLOOD GLUCOSE      POCT Blood Glucose.: 191 mg/dL (15 Oct 2023 05:53)      HOSPITAL MEDICATIONS:  MEDICATIONS  (STANDING):  albuterol    0.083% 2.5 milliGRAM(s) Nebulizer every 6 hours  apixaban 5 milliGRAM(s) Oral every 12 hours  artificial tears (preservative free) Ophthalmic Solution 1 Drop(s) Both EYES every 4 hours  atorvastatin 10 milliGRAM(s) Oral at bedtime  chlorhexidine 0.12% Liquid 15 milliLiter(s) Oral Mucosa every 12 hours  chlorhexidine 2% Cloths 1 Application(s) Topical daily  dextrose 5%. 1000 milliLiter(s) (50 mL/Hr) IV Continuous <Continuous>  dextrose 5%. 1000 milliLiter(s) (100 mL/Hr) IV Continuous <Continuous>  dextrose 50% Injectable 12.5 Gram(s) IV Push once  dextrose 50% Injectable 25 Gram(s) IV Push once  dextrose 50% Injectable 25 Gram(s) IV Push once  dextrose 50% Injectable 25 Gram(s) IV Push once  epoetin odalis (EPOGEN) Injectable 86164 Unit(s) IV Push <User Schedule>  ferrous    sulfate Liquid 300 milliGRAM(s) Oral daily  glucagon  Injectable 1 milliGRAM(s) IntraMuscular once  insulin lispro (ADMELOG) corrective regimen sliding scale   SubCutaneous every 6 hours  insulin NPH human recombinant 6 Unit(s) SubCutaneous every 6 hours  levETIRAcetam  Solution 1000 milliGRAM(s) Oral daily  levETIRAcetam  Solution 250 milliGRAM(s) Oral <User Schedule>  midodrine 10 milliGRAM(s) Oral once  midodrine. 30 milliGRAM(s) Oral every 8 hours  pantoprazole  Injectable 40 milliGRAM(s) IV Push two times a day  petrolatum Ophthalmic Ointment 1 Application(s) Both EYES four times a day  polymyxin B IVPB 240594 Unit(s) IV Intermittent every 12 hours  sodium chloride 1 Gram(s) Oral two times a day    MEDICATIONS  (PRN):  acetaminophen   Oral Liquid .. 650 milliGRAM(s) Oral every 6 hours PRN Temp greater or equal to 38C (100.4F), Mild Pain (1 - 3)  calamine/zinc oxide Lotion 1 Application(s) Topical two times a day PRN Rash and/or Itching  dextrose Oral Gel 15 Gram(s) Oral once PRN Blood Glucose LESS THAN 70 milliGRAM(s)/deciliter  sodium chloride 0.9% lock flush 10 milliLiter(s) IV Push every 1 hour PRN Pre/post blood products, medications, blood draw, and to maintain line patency      LABS:                        6.8    14.00 )-----------( 394      ( 14 Oct 2023 21:02 )             22.1     10-14    132<L>  |  102  |  24<H>  ----------------------------<  216<H>  3.8   |  25  |  1.29    Ca    6.7<L>      14 Oct 2023 21:02  Phos  2.5     10-14  Mg     1.80     10-14      Urinalysis Basic - ( 14 Oct 2023 21:02 )      Color: x / Appearance: x / SG: x / pH: x  Gluc: 216 mg/dL / Ketone: x  / Bili: x / Urobili: x   Blood: x / Protein: x / Nitrite: x   Leuk Esterase: x / RBC: x / WBC x   Sq Epi: x / Non Sq Epi: x / Bacteria: x      Arterial Blood Gas:  10-13 @ 17:00  7.34/36/186/19/99.5/-5.8  ABG lactate: --  Arterial Blood Gas:  10-13 @ 13:50  7.27/63/46/29/78.3/0.5  ABG lactate: --    Venous Blood Gas:  10-14 @ 21:02  7.34/55/48/30/78.8  VBG Lactate: 1.7      PAST MEDICAL & SURGICAL HISTORY:  Breast CA      Diabetes      Stroke      Cardiac arrest      HTN (hypertension)      H/O mastectomy, bilateral      FAMILY HISTORY:  No pertinent family history in first degree relatives      Social History:      RADIOLOGY:  [ ] Reviewed and interpreted by me    PULMONARY FUNCTION TESTS:    EKG:

## 2023-10-16 NOTE — PROGRESS NOTE ADULT - SUBJECTIVE AND OBJECTIVE BOX
CHIEF COMPLAINT:Patient is a 73y old  Female who presents with a chief complaint of Respiratory distress (15 Oct 2023 21:05)      INTERVAL EVENTS:     ROS: Seen by bedside during AM rounds     OBJECTIVE:  ICU Vital Signs Last 24 Hrs  T(C): 37.2 (16 Oct 2023 04:55), Max: 38 (15 Oct 2023 17:30)  T(F): 99 (16 Oct 2023 04:55), Max: 100.4 (15 Oct 2023 17:30)  HR: 122 (16 Oct 2023 07:50) (99 - 122)  BP: 111/55 (16 Oct 2023 04:55) (94/33 - 127/59)  BP(mean): --  ABP: --  ABP(mean): --  RR: 18 (16 Oct 2023 04:55) (18 - 19)  SpO2: 96% (16 Oct 2023 07:50) (96% - 113%)    O2 Parameters below as of 16 Oct 2023 04:55  Patient On (Oxygen Delivery Method): ventilator    O2 Concentration (%): 35      Mode: AC/ CMV (Assist Control/ Continuous Mandatory Ventilation), RR (machine): 24, FiO2: 40, PEEP: 8, PS: 35, ITime: 0.8, MAP: 21, PC: 40, PIP: 48    10-15 @ 07:01  -  10-16 @ 07:00  --------------------------------------------------------  IN: 920 mL / OUT: 0 mL / NET: 920 mL      CAPILLARY BLOOD GLUCOSE      POCT Blood Glucose.: 212 mg/dL (16 Oct 2023 05:13)      PHYSICAL EXAM:  General:   HEENT:   Lymph Nodes:  Neck:   Respiratory:   Cardiovascular:   Abdomen:   Extremities:   Skin:   Neurological:  Psychiatry:    Mode: AC/ CMV (Assist Control/ Continuous Mandatory Ventilation)  RR (machine): 24  FiO2: 40  PEEP: 8  PS: 35  ITime: 0.8  MAP: 21  PC: 40  PIP: 48      HOSPITAL MEDICATIONS:  MEDICATIONS  (STANDING):  albuterol    0.083% 2.5 milliGRAM(s) Nebulizer every 6 hours  apixaban 5 milliGRAM(s) Oral every 12 hours  artificial tears (preservative free) Ophthalmic Solution 1 Drop(s) Both EYES every 4 hours  atorvastatin 10 milliGRAM(s) Oral at bedtime  chlorhexidine 0.12% Liquid 15 milliLiter(s) Oral Mucosa every 12 hours  chlorhexidine 2% Cloths 1 Application(s) Topical daily  dextrose 5%. 1000 milliLiter(s) (50 mL/Hr) IV Continuous <Continuous>  dextrose 5%. 1000 milliLiter(s) (100 mL/Hr) IV Continuous <Continuous>  dextrose 50% Injectable 12.5 Gram(s) IV Push once  dextrose 50% Injectable 25 Gram(s) IV Push once  dextrose 50% Injectable 25 Gram(s) IV Push once  dextrose 50% Injectable 25 Gram(s) IV Push once  epoetin odalis (EPOGEN) Injectable 80544 Unit(s) IV Push <User Schedule>  ferrous    sulfate Liquid 300 milliGRAM(s) Oral daily  glucagon  Injectable 1 milliGRAM(s) IntraMuscular once  insulin lispro (ADMELOG) corrective regimen sliding scale   SubCutaneous every 6 hours  insulin NPH human recombinant 6 Unit(s) SubCutaneous every 6 hours  levETIRAcetam  Solution 1000 milliGRAM(s) Oral daily  levETIRAcetam  Solution 250 milliGRAM(s) Oral <User Schedule>  midodrine 10 milliGRAM(s) Oral once  midodrine. 30 milliGRAM(s) Oral every 8 hours  pantoprazole  Injectable 40 milliGRAM(s) IV Push two times a day  petrolatum Ophthalmic Ointment 1 Application(s) Both EYES four times a day  sodium chloride 1 Gram(s) Oral two times a day    MEDICATIONS  (PRN):  acetaminophen   Oral Liquid .. 650 milliGRAM(s) Oral every 6 hours PRN Temp greater or equal to 38C (100.4F), Mild Pain (1 - 3)  dextrose Oral Gel 15 Gram(s) Oral once PRN Blood Glucose LESS THAN 70 milliGRAM(s)/deciliter  sodium chloride 0.9% lock flush 10 milliLiter(s) IV Push every 1 hour PRN Pre/post blood products, medications, blood draw, and to maintain line patency      LABS:                        6.8    14.00 )-----------( 394      ( 14 Oct 2023 21:02 )             22.1     10-14    132<L>  |  102  |  24<H>  ----------------------------<  216<H>  3.8   |  25  |  1.29    Ca    6.7<L>      14 Oct 2023 21:02  Phos  2.5     10-14  Mg     1.80     10-14        Urinalysis Basic - ( 14 Oct 2023 21:02 )    Color: x / Appearance: x / SG: x / pH: x  Gluc: 216 mg/dL / Ketone: x  / Bili: x / Urobili: x   Blood: x / Protein: x / Nitrite: x   Leuk Esterase: x / RBC: x / WBC x   Sq Epi: x / Non Sq Epi: x / Bacteria: x        Venous Blood Gas:  10-14 @ 21:02  7.34/55/48/30/78.8  VBG Lactate: 1.7   CHIEF COMPLAINT:Patient is a 73y old  Female who presents with a chief complaint of Respiratory distress (15 Oct 2023 21:05)      INTERVAL EVENTS: overnight ngt became dislodged, replaced this morning at bedside. Also with reported erythematous chest rash following blood transfusion yesterday morning with c/f possible transfusion reaction and workup sent to Blood Bank. Also with new low grade fever last evening. Monitoring, if recurrence, will start Aztreonam per ID recs. Hypotension this afternoon as Midodrine administration was delayed given dislodged ngt.     ROS: Unable to obtain ROS given poor mentation.    OBJECTIVE:  ICU Vital Signs Last 24 Hrs  T(C): 37.2 (16 Oct 2023 04:55), Max: 38 (15 Oct 2023 17:30)  T(F): 99 (16 Oct 2023 04:55), Max: 100.4 (15 Oct 2023 17:30)  HR: 122 (16 Oct 2023 07:50) (99 - 122)  BP: 111/55 (16 Oct 2023 04:55) (94/33 - 127/59)  BP(mean): --  ABP: --  ABP(mean): --  RR: 18 (16 Oct 2023 04:55) (18 - 19)  SpO2: 96% (16 Oct 2023 07:50) (96% - 113%)    O2 Parameters below as of 16 Oct 2023 04:55  Patient On (Oxygen Delivery Method): ventilator    O2 Concentration (%): 35      Mode: AC/ CMV (Assist Control/ Continuous Mandatory Ventilation), RR (machine): 24, FiO2: 40, PEEP: 8, PS: 35, ITime: 0.8, MAP: 21, PC: 40, PIP: 48    10-15 @ 07:01  -  10-16 @ 07:00  --------------------------------------------------------  IN: 920 mL / OUT: 0 mL / NET: 920 mL      CAPILLARY BLOOD GLUCOSE      POCT Blood Glucose.: 212 mg/dL (16 Oct 2023 05:13)      PHYSICAL EXAM:  General: NAD   Neck: (+) Trach tube noted, site c/d/i. (+)TRISHA Rosado  Cards: S1/S2, no murmurs   Pulm: Coarse wheezing b/l. No resp distress.  Abdomen: Soft, Nontender. Lower abd wall edema.   Extremities: Anasarca.   Neurology: Somnolent. Nonverbal. Not following commands.   Skin: (+) nonraised erythematous dispersed patches across chest. Ext warm to touch, color appropriate for ethnicity. Refer to RN assessment for further details.    Mode: AC/ CMV (Assist Control/ Continuous Mandatory Ventilation)  RR (machine): 24  FiO2: 40  PEEP: 8  PS: 35  ITime: 0.8  MAP: 21  PC: 40  PIP: 48      HOSPITAL MEDICATIONS:  MEDICATIONS  (STANDING):  albuterol    0.083% 2.5 milliGRAM(s) Nebulizer every 6 hours  apixaban 5 milliGRAM(s) Oral every 12 hours  artificial tears (preservative free) Ophthalmic Solution 1 Drop(s) Both EYES every 4 hours  atorvastatin 10 milliGRAM(s) Oral at bedtime  chlorhexidine 0.12% Liquid 15 milliLiter(s) Oral Mucosa every 12 hours  chlorhexidine 2% Cloths 1 Application(s) Topical daily  dextrose 5%. 1000 milliLiter(s) (50 mL/Hr) IV Continuous <Continuous>  dextrose 5%. 1000 milliLiter(s) (100 mL/Hr) IV Continuous <Continuous>  dextrose 50% Injectable 12.5 Gram(s) IV Push once  dextrose 50% Injectable 25 Gram(s) IV Push once  dextrose 50% Injectable 25 Gram(s) IV Push once  dextrose 50% Injectable 25 Gram(s) IV Push once  epoetin odalis (EPOGEN) Injectable 94523 Unit(s) IV Push <User Schedule>  ferrous    sulfate Liquid 300 milliGRAM(s) Oral daily  glucagon  Injectable 1 milliGRAM(s) IntraMuscular once  insulin lispro (ADMELOG) corrective regimen sliding scale   SubCutaneous every 6 hours  insulin NPH human recombinant 6 Unit(s) SubCutaneous every 6 hours  levETIRAcetam  Solution 1000 milliGRAM(s) Oral daily  levETIRAcetam  Solution 250 milliGRAM(s) Oral <User Schedule>  midodrine 10 milliGRAM(s) Oral once  midodrine. 30 milliGRAM(s) Oral every 8 hours  pantoprazole  Injectable 40 milliGRAM(s) IV Push two times a day  petrolatum Ophthalmic Ointment 1 Application(s) Both EYES four times a day  sodium chloride 1 Gram(s) Oral two times a day    MEDICATIONS  (PRN):  acetaminophen   Oral Liquid .. 650 milliGRAM(s) Oral every 6 hours PRN Temp greater or equal to 38C (100.4F), Mild Pain (1 - 3)  dextrose Oral Gel 15 Gram(s) Oral once PRN Blood Glucose LESS THAN 70 milliGRAM(s)/deciliter  sodium chloride 0.9% lock flush 10 milliLiter(s) IV Push every 1 hour PRN Pre/post blood products, medications, blood draw, and to maintain line patency      LABS:                        6.8    14.00 )-----------( 394      ( 14 Oct 2023 21:02 )             22.1     10-14    132<L>  |  102  |  24<H>  ----------------------------<  216<H>  3.8   |  25  |  1.29    Ca    6.7<L>      14 Oct 2023 21:02  Phos  2.5     10-14  Mg     1.80     10-14        Urinalysis Basic - ( 14 Oct 2023 21:02 )    Color: x / Appearance: x / SG: x / pH: x  Gluc: 216 mg/dL / Ketone: x  / Bili: x / Urobili: x   Blood: x / Protein: x / Nitrite: x   Leuk Esterase: x / RBC: x / WBC x   Sq Epi: x / Non Sq Epi: x / Bacteria: x        Venous Blood Gas:  10-14 @ 21:02  7.34/55/48/30/78.8  VBG Lactate: 1.7

## 2023-10-16 NOTE — PROGRESS NOTE ADULT - SUBJECTIVE AND OBJECTIVE BOX
Issues with IHD this AM. initially with clot burden in catheter, now s/p cathflo with initial improvement of BFR however then with worsening BFR and hypotension and HD was terminated after a total of 2 hours of dialysis.     acetaminophen   Oral Liquid .. 650 milliGRAM(s) Oral every 6 hours PRN  albuterol    0.083% 2.5 milliGRAM(s) Nebulizer every 6 hours  apixaban 5 milliGRAM(s) Oral every 12 hours  artificial tears (preservative free) Ophthalmic Solution 1 Drop(s) Both EYES every 4 hours  atorvastatin 10 milliGRAM(s) Oral at bedtime  chlorhexidine 0.12% Liquid 15 milliLiter(s) Oral Mucosa every 12 hours  chlorhexidine 2% Cloths 1 Application(s) Topical daily  dextrose 5%. 1000 milliLiter(s) IV Continuous <Continuous>  dextrose 5%. 1000 milliLiter(s) IV Continuous <Continuous>  dextrose 50% Injectable 12.5 Gram(s) IV Push once  dextrose 50% Injectable 25 Gram(s) IV Push once  dextrose 50% Injectable 25 Gram(s) IV Push once  dextrose 50% Injectable 25 Gram(s) IV Push once  dextrose Oral Gel 15 Gram(s) Oral once PRN  diphenhydrAMINE Injectable 12.5 milliGRAM(s) IV Push once  epoetin odalis (EPOGEN) Injectable 33352 Unit(s) IV Push <User Schedule>  ferrous    sulfate Liquid 300 milliGRAM(s) Oral daily  glucagon  Injectable 1 milliGRAM(s) IntraMuscular once  insulin lispro (ADMELOG) corrective regimen sliding scale   SubCutaneous every 6 hours  insulin NPH human recombinant 6 Unit(s) SubCutaneous every 6 hours  levETIRAcetam  Solution 1000 milliGRAM(s) Oral daily  levETIRAcetam  Solution 250 milliGRAM(s) Oral <User Schedule>  midodrine 10 milliGRAM(s) Oral once  midodrine. 30 milliGRAM(s) Oral every 8 hours  pantoprazole  Injectable 40 milliGRAM(s) IV Push two times a day  petrolatum Ophthalmic Ointment 1 Application(s) Both EYES four times a day  sodium chloride 1 Gram(s) Oral two times a day  sodium chloride 0.9% lock flush 10 milliLiter(s) IV Push every 1 hour PRN      VITAL:  T(C): , Max: 38 (10-15-23 @ 17:30)  T(F): , Max: 100.4 (10-15-23 @ 17:30)  HR: 110 (10-15-23 @ 17:30)  BP: 110/54 (10-15-23 @ 17:30)  BP(mean): --  RR: 19 (10-15-23 @ 17:30)  SpO2: 97% (10-15-23 @ 17:30)  Wt(kg): --    10-14-23 @ 07:01  -  10-15-23 @ 07:00  --------------------------------------------------------  IN: 1660 mL / OUT: 1515 mL / NET: 145 mL    10-15-23 @ 07:01  -  10-15-23 @ 21:06  --------------------------------------------------------  IN: 480 mL / OUT: 0 mL / NET: 480 mL        PHYSICAL EXAM:  Constitutional: ill appearing, trached to vent  HEENT: trach-vent, (+)NGT  Respiratory: coarse BS b/l  Cardiovascular: tachy reg s1s2  Gastrointestinal: BS+, soft, NT/ND  Extremities: + peripheral edema  Neurological: tone WNL  Skin: No rashes  Access: (+)[facility]    LABS:                          6.8    14.00 )-----------( 394      ( 14 Oct 2023 21:02 )             22.1     Na(132)/K(3.8)/Cl(102)/HCO3(25)/BUN(24)/Cr(1.29)Glu(216)/Ca(6.7)/Mg(1.80)/PO4(2.5)    10-14 @ 21:02  Na(131)/K(4.9)/Cl(95)/HCO3(26)/BUN(36)/Cr(1.69)Glu(182)/Ca(8.3)/Mg(2.10)/PO4(3.4)    10-13 @ 13:50    Urinalysis Basic - ( 14 Oct 2023 21:02 )    Color: x / Appearance: x / SG: x / pH: x  Gluc: 216 mg/dL / Ketone: x  / Bili: x / Urobili: x   Blood: x / Protein: x / Nitrite: x   Leuk Esterase: x / RBC: x / WBC x   Sq Epi: x / Non Sq Epi: x / Bacteria: x              IMPRESSION: 73F w/ HTN, DM2, CVA, breast CA-bilateral mastectomy, recurrent aspiration pneumonia/respiratory failure, and CKD, 8/11/23 p/w acute hypercapnic respiratory failure; c/b RUSS    (1)Renal - RUSS on CKD4 ==>now newly ESRD. now s/p IHD for a total of approx 2 hours today. s/p cathflo.     (2)Hyponatremia - improved/stable as of late with HD    (3)CV- tenuous hemodynamics such with each passing week we have more difficulty performing HD/achieving UF. persistent issue.     (6)Pulm- vent-dependent    RECOMMEND:  (1)Next planned for tomorrow with hypotensive precautions during HD given shortened session today. would also recommend re-insertion of NG tube for midodrine prior to IHD.   (3)Tunneled cath placement if/when clinically optimized for the procedure  (4)Dose new meds for GFR <10/HD.       Benjamin Humphrey DO  [employer]  [phone]

## 2023-10-16 NOTE — PROGRESS NOTE ADULT - SUBJECTIVE AND OBJECTIVE BOX
Follow Up:  MSSA bacteremia, otitis external, VAP, fever    Interval History: pt had drop in Hgb s/p transfusion and again fever  yesterday, WBC also increased to 15    ROS:  unable to obtain because: trached, AMS        Allergies  isoniazid (Rash)  nafcillin (Unknown)  hydrALAZINE (Rash)  vitamin E (Short breath; Urticaria; Hives)  doxycycline (Rash)  cefepime (Rash)  NIFEdipine (Urticaria; Hives)        ANTIMICROBIALS:      OTHER MEDS:  acetaminophen   Oral Liquid .. 650 milliGRAM(s) Oral every 6 hours PRN  albuterol    0.083% 2.5 milliGRAM(s) Nebulizer every 6 hours  apixaban 5 milliGRAM(s) Oral every 12 hours  artificial tears (preservative free) Ophthalmic Solution 1 Drop(s) Both EYES every 4 hours  atorvastatin 10 milliGRAM(s) Oral at bedtime  chlorhexidine 0.12% Liquid 15 milliLiter(s) Oral Mucosa every 12 hours  chlorhexidine 2% Cloths 1 Application(s) Topical daily  dextrose 5%. 1000 milliLiter(s) IV Continuous <Continuous>  dextrose 5%. 1000 milliLiter(s) IV Continuous <Continuous>  dextrose 50% Injectable 12.5 Gram(s) IV Push once  dextrose 50% Injectable 25 Gram(s) IV Push once  dextrose 50% Injectable 25 Gram(s) IV Push once  dextrose 50% Injectable 25 Gram(s) IV Push once  dextrose Oral Gel 15 Gram(s) Oral once PRN  epoetin odlais (EPOGEN) Injectable 24807 Unit(s) IV Push <User Schedule>  ferrous    sulfate Liquid 300 milliGRAM(s) Oral daily  glucagon  Injectable 1 milliGRAM(s) IntraMuscular once  insulin lispro (ADMELOG) corrective regimen sliding scale   SubCutaneous every 6 hours  insulin NPH human recombinant 6 Unit(s) SubCutaneous every 6 hours  levETIRAcetam  Solution 1000 milliGRAM(s) Oral daily  levETIRAcetam  Solution 250 milliGRAM(s) Oral <User Schedule>  midodrine 10 milliGRAM(s) Oral once  midodrine. 30 milliGRAM(s) Oral every 8 hours  pantoprazole  Injectable 40 milliGRAM(s) IV Push two times a day  petrolatum Ophthalmic Ointment 1 Application(s) Both EYES four times a day  sodium chloride 1 Gram(s) Oral two times a day  sodium chloride 0.9% lock flush 10 milliLiter(s) IV Push every 1 hour PRN      Vital Signs Last 24 Hrs  T(C): 37.4 (16 Oct 2023 12:00), Max: 38 (15 Oct 2023 17:30)  T(F): 99.3 (16 Oct 2023 12:00), Max: 100.4 (15 Oct 2023 17:30)  HR: 121 (16 Oct 2023 12:00) (99 - 124)  BP: 102/65 (16 Oct 2023 12:00) (94/33 - 125/40)  BP(mean): --  RR: 24 (16 Oct 2023 12:00) (18 - 24)  SpO2: 94% (16 Oct 2023 12:00) (90% - 113%)    Parameters below as of 16 Oct 2023 12:00  Patient On (Oxygen Delivery Method): ventilator    O2 Concentration (%): 35    Physical Exam:  General:    trached   Respiratory:   trach on vent  abd:  distended but soft  :  no  willard  Musculoskeletal : generalized edema  Skin: no rash now  vascular: TRISHA odom                                   9.0    15.31 )-----------( 493      ( 16 Oct 2023 07:51 )             27.2       10-16    130<L>  |  93<L>  |  37<H>  ----------------------------<  177<H>  5.1   |  27  |  1.71<H>    Ca    8.3<L>      16 Oct 2023 07:51  Phos  4.2     10-16  Mg     2.20     10-16        Urinalysis Basic - ( 16 Oct 2023 07:51 )    Color: x / Appearance: x / SG: x / pH: x  Gluc: 177 mg/dL / Ketone: x  / Bili: x / Urobili: x   Blood: x / Protein: x / Nitrite: x   Leuk Esterase: x / RBC: x / WBC x   Sq Epi: x / Non Sq Epi: x / Bacteria: x        MICROBIOLOGY:  v  .Blood Blood-Peripheral  10-14-23   No growth at 24 hours  --  --      Trach Asp Tracheal Aspirate  10-14-23   Numerous Pseudomonas aeruginosa Susceptibility to follow.  Numerous Proteus mirabilis Susceptibility to follow.  Normal Respiratory Cate present  --    Few polymorphonuclear leukocytes per low power field  No Squamous epithelial cells per low power field  Numerous Gram Negative Rods per oil power field  Rare Gram Positive Cocci per oil power field      .Blood Blood-Venous  10-09-23   No growth at 5 days  --  --      Trach Asp Tracheal Aspirate  10-03-23   Numerous Pseudomonas aeruginosa (Carbapenem Resistant) Susceptibility to  follow.  Normal Respiratory Cate absent  --  Pseudomonas aeruginosa (Carbapenem Resistant)      .Blood Blood-Venous  09-29-23   No growth at 5 days  --  --      .Blood Blood-Peripheral  09-29-23   No growth at 5 days  --  --      ET Tube ET Tube  09-29-23   Rare Yeast  --  --      ET Tube ET Tube  09-29-23   Numerous Proteus mirabilis  Moderate Pseudomonas aeruginosa (Carbapenem Resistant)  Normal Respiratory Cate absent  --  Proteus mirabilis  Pseudomonas aeruginosa (Carbapenem Resistant)      Clean Catch Clean Catch (Midstream)  09-26-23   10,000 - 49,000 CFU/mL Candida albicans "Susceptibilities not performed"  --  --      .Blood Blood-Peripheral  09-26-23   No growth at 5 days  --  --      .Blood Blood-Venous  09-20-23   No growth at 5 days  --  --      .Blood Blood-Peripheral  09-20-23   No growth at 5 days  --  --          Rapid RVP Result: NotDete (10-14 @ 05:10)        RADIOLOGY:  Images independently visualized and reviewed personally, findings as below  < from: Xray Chest 1 View- PORTABLE-Urgent (Xray Chest 1 View- PORTABLE-Urgent .) (10.13.23 @ 03:37) >  IMPRESSION:  Similar pulmonary edema and right upper lobe airspace opacities which   could also represent edema although developing infection is not excluded.    Similar small loculated right pleural effusion.    < end of copied text >

## 2023-10-16 NOTE — PROGRESS NOTE ADULT - ASSESSMENT
72 [facility] F [facility] 7/12-7/22 w aspiration PNA, was treated w CEFEPIME, developed an allergic rash,  dCHF, + MAC on AFB culture, had been progressively getting more and more lethargic and dyspneic at home since DC.   In  am of 8/11/23  ptn presented with respiratory distress w hypoxia and hypercarbia requiring intubation 2/2 volume overload +/- Asp PNA      Neuro   not responsive  Baseline MS AOx3, aphasic   - h/o CVA , on aspirin and statin . resumed w feeding tube, ASA resumed 8/26  - eeg  2/2 tremors, no sz focus, on KEPPRA  - less responsive in the past few days  - MRI 8/17:  new R cerebellar infarct, old left PCA/Occipital infarct. probably embolic in nature, did not tolerate full AC in the past, STEPHANIE is neg , no shunts observed  - ptn is poorly reactive, rpt scan done, no further CVA seen.     Cardiac   cardiology following  CHFpEF   TTE 7/2023 with EF 59%, with severe LVH and diastolic dysfunction       Pulmonary   Acute hypercapnic and hypoxemic respiratory failure   prolonged intubation, now  trache to  vent, has copious secretions      Renal   on HD via L IJ [place], awaiting tunneled catheter when cleared by RCU team  HD MWF, midodrine assisted, PUF in between HD days, unable to remove proper volume 2/2 hypotension.   permacath when clinically stable      GI  NPO  on tube feeds  coffee ground emesis x 1 8/24, melena overnight 8/31, s/p 1UPRBC, EGD/Colonoscopy: erosive gastropathy, esophagisits, on colonoscopy old blood seen no active bleeding, poor prep  family to decide re PEG placement    Endocrine  on Insulin: NPH, Admelog    ID   complicated infectious hospital course  treated for MSSA bacteremia, aspiration PNA.  sputum + for pseudomonas, ptn was initially on zosyn but was allergic, then was switched to aztreonam   Aztreonam was switched to polymyxin for a possible allergy but ptn didnt have a reaction to aztreonam, she had a reaction to Zosyn.   would change polymyxin back to aztreonam as ptn is spiking temps through polymyxin. asked ID to re-evaluate     ID re-evaluated, now off ABx, follow fever curve. reculture if spikes temp    ID course complicated with muliple ABx allergies  also treated for Left O. 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 and she remains full code at present

## 2023-10-16 NOTE — PROGRESS NOTE ADULT - SUBJECTIVE AND OBJECTIVE BOX
Subjective: Patient seen and examined. No new events except as noted.     REVIEW OF SYSTEMS:    Unable to obtain     MEDICATIONS:  MEDICATIONS  (STANDING):  albuterol    0.083% 2.5 milliGRAM(s) Nebulizer every 6 hours  apixaban 5 milliGRAM(s) Oral every 12 hours  artificial tears (preservative free) Ophthalmic Solution 1 Drop(s) Both EYES every 4 hours  atorvastatin 10 milliGRAM(s) Oral at bedtime  chlorhexidine 0.12% Liquid 15 milliLiter(s) Oral Mucosa every 12 hours  chlorhexidine 2% Cloths 1 Application(s) Topical daily  dextrose 5%. 1000 milliLiter(s) (50 mL/Hr) IV Continuous <Continuous>  dextrose 5%. 1000 milliLiter(s) (100 mL/Hr) IV Continuous <Continuous>  dextrose 50% Injectable 12.5 Gram(s) IV Push once  dextrose 50% Injectable 25 Gram(s) IV Push once  dextrose 50% Injectable 25 Gram(s) IV Push once  dextrose 50% Injectable 25 Gram(s) IV Push once  epoetin odalis (EPOGEN) Injectable 40367 Unit(s) IV Push <User Schedule>  ferrous    sulfate Liquid 300 milliGRAM(s) Oral daily  glucagon  Injectable 1 milliGRAM(s) IntraMuscular once  insulin lispro (ADMELOG) corrective regimen sliding scale   SubCutaneous every 6 hours  insulin NPH human recombinant 6 Unit(s) SubCutaneous every 6 hours  levETIRAcetam  Solution 1000 milliGRAM(s) Oral daily  levETIRAcetam  Solution 250 milliGRAM(s) Oral <User Schedule>  midodrine 10 milliGRAM(s) Oral once  midodrine. 30 milliGRAM(s) Oral every 8 hours  pantoprazole  Injectable 40 milliGRAM(s) IV Push two times a day  petrolatum Ophthalmic Ointment 1 Application(s) Both EYES four times a day  sodium chloride 1 Gram(s) Oral two times a day      PHYSICAL EXAM:  T(C): 36.2 (10-16-23 @ 08:00), Max: 38 (10-15-23 @ 17:30)  HR: 124 (10-16-23 @ 11:01) (99 - 124)  BP: 104/59 (10-16-23 @ 08:39) (94/33 - 127/59)  RR: 24 (10-16-23 @ 08:45) (18 - 24)  SpO2: 97% (10-16-23 @ 11:01) (90% - 113%)  Wt(kg): --  I&O's Summary    15 Oct 2023 07:01  -  16 Oct 2023 07:00  --------------------------------------------------------  IN: 920 mL / OUT: 0 mL / NET: 920 mL    16 Oct 2023 07:01  -  16 Oct 2023 13:05  --------------------------------------------------------  IN: 0 mL / OUT: 0 mL / NET: 0 mL          Appearance: NAD, +trach  HEENT: dry oral mucosa  Lymphatic: No lymphadenopathy  Cardiovascular: Normal S1 S2, No JVD, No murmurs, No edema  Respiratory: Decreased BS, + trach to vent	  Neuro: opens eyes  Gastrointestinal: Soft, Non-tender, + BS, + NGT  Skin: No rashes, No ecchymoses, No cyanosis	  Extremities: No strength/ROM 2/2 sedation, + BL LE edema  Vascular: Peripheral pulses palpable 2+ bilaterally  Mode: AC/ CMV (Assist Control/ Continuous Mandatory Ventilation), RR (machine): 20, TV (machine): 340, FiO2: 35, PEEP: 8, ITime: 0.8, MAP: 19, PC: 38, PIP: 46          LABS:    CARDIAC MARKERS:                                9.0    15.31 )-----------( 493      ( 16 Oct 2023 07:51 )             27.2     10-16    130<L>  |  93<L>  |  37<H>  ----------------------------<  177<H>  5.1   |  27  |  1.71<H>    Ca    8.3<L>      16 Oct 2023 07:51  Phos  4.2     10-16  Mg     2.20     10-16      proBNP:   Lipid Profile:   HgA1c:   TSH:             TELEMETRY: 	    ECG:  	  RADIOLOGY:   DIAGNOSTIC TESTING:  [ ] Echocardiogram:  [ ]  Catheterization:  [ ] Stress Test:    OTHER:

## 2023-10-16 NOTE — PROGRESS NOTE ADULT - ASSESSMENT
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 While in MICU patient noted with worsening renal failure requiring HD initiation, CGE/ Melena s/p EGD 8/31 found with esophagitis and erosive gastropathy, new right cerebellar infarct and fevers second to ESBL COLI and MSSA VAP, MSSA bacteremia, left ear otitis externa and drug rxn to cephalosporins Course further complicated by prolonged vent time s/p tracheostomy 9/1 and transferred to RCU 9/3 completed by recurrent fevers with high PIP, respiratory acidosis and concern for volume overloaded.   CTH repeated 9/26 for concern for encephalopathy - has known now - chronic bilateral cerebellar infarcts, L PCA infarct. L parietal hypodensity seen on prior CT likely artifactual  Repeat CTH 10/3 - unchanged  EEG 10/5 with L posterocentral/temporal spikes/sharp waves - doubt true focal seizure upon further review of EEG     Impression:   ? Focal seizure - has hx of bilateral R > L tremors   Metabolic encephalopathy related to shock, infection, doubt new stroke  L PCA stroke, ESUS s/p ILR, also with bilateral centrum semiovale watershed infarcts   Multiple embolic strokes on MRI 8/17 - R cerebellum, midbrain, multiple other tiny embolic infarcts.   Dementia   MSSA bacteremia, r/o endocarditis s/p Daptomycin  Acute popliteal DVT on Eliquis   Anemia     Recommendations   [] has multiple areas of epileptogenic potential on EEG, no clear seizure correlate seen. On Keppra but no improvement in mental status  [] consider MRI brain once stable  [] mgmt of hypotension per piumary team, remains full code  [] Keppra 500mg BID with extra 250mg after HD on HD days.  [] Abx per ID  [] New CTH from 9/26 and 10/3 without any evidence of acute infarct -> encephalopathy likely related to underlying metabolic derangements.  [] GI following for coffee ground emesis - esophagitis seen on colonoscopy, on ELiquis  [] family declined kidney biopsy for AIN -> s/p steroid tx   []  stop aspirin as back on Eliquis  [] C/w home Atorvastatin 10 mg qhs   [] would interrogate ILR and review tele to see if pAF -. no AF seen  [] continue to address GOC with family as pts  and daughter continue to remain hopeful    Katty Charles, DO  Vascular Neurology  Office [phone]

## 2023-10-16 NOTE — PROGRESS NOTE ADULT - SUBJECTIVE AND OBJECTIVE BOX
Patient is a 73y old  Female who presents with a chief complaint of Respiratory distress (16 Oct 2023 15:00)      SUBJECTIVE / OVERNIGHT EVENTS: ID re-evaluated, now off ABx, follow fever curve. reculture if spikes temp    MEDICATIONS  (STANDING):  albuterol    0.083% 2.5 milliGRAM(s) Nebulizer every 6 hours  apixaban 5 milliGRAM(s) Oral every 12 hours  artificial tears (preservative free) Ophthalmic Solution 1 Drop(s) Both EYES every 4 hours  atorvastatin 10 milliGRAM(s) Oral at bedtime  chlorhexidine 0.12% Liquid 15 milliLiter(s) Oral Mucosa every 12 hours  chlorhexidine 2% Cloths 1 Application(s) Topical daily  dextrose 5%. 1000 milliLiter(s) (100 mL/Hr) IV Continuous <Continuous>  dextrose 5%. 1000 milliLiter(s) (50 mL/Hr) IV Continuous <Continuous>  dextrose 50% Injectable 25 Gram(s) IV Push once  dextrose 50% Injectable 25 Gram(s) IV Push once  dextrose 50% Injectable 25 Gram(s) IV Push once  dextrose 50% Injectable 12.5 Gram(s) IV Push once  epoetin odalis (EPOGEN) Injectable 27606 Unit(s) IV Push <User Schedule>  ferrous    sulfate Liquid 300 milliGRAM(s) Oral daily  glucagon  Injectable 1 milliGRAM(s) IntraMuscular once  insulin lispro (ADMELOG) corrective regimen sliding scale   SubCutaneous every 6 hours  insulin NPH human recombinant 6 Unit(s) SubCutaneous every 6 hours  levETIRAcetam  Solution 1000 milliGRAM(s) Oral daily  levETIRAcetam  Solution 250 milliGRAM(s) Oral <User Schedule>  midodrine 10 milliGRAM(s) Oral once  midodrine. 30 milliGRAM(s) Oral every 8 hours  pantoprazole  Injectable 40 milliGRAM(s) IV Push two times a day  petrolatum Ophthalmic Ointment 1 Application(s) Both EYES four times a day  sodium chloride 1 Gram(s) Oral two times a day    MEDICATIONS  (PRN):  acetaminophen   Oral Liquid .. 650 milliGRAM(s) Oral every 6 hours PRN Temp greater or equal to 38C (100.4F), Mild Pain (1 - 3)  dextrose Oral Gel 15 Gram(s) Oral once PRN Blood Glucose LESS THAN 70 milliGRAM(s)/deciliter  sodium chloride 0.9% lock flush 10 milliLiter(s) IV Push every 1 hour PRN Pre/post blood products, medications, blood draw, and to maintain line patency      Vital Signs Last 24 Hrs  T(F): 99.3 (10-16-23 @ 12:00), Max: 99.3 (10-16-23 @ 12:00)  HR: 110 (10-16-23 @ 16:46) (101 - 124)  BP: 102/65 (10-16-23 @ 12:00) (94/33 - 125/40)  RR: 24 (10-16-23 @ 12:00) (18 - 24)  SpO2: 96% (10-16-23 @ 16:46) (90% - 99%)  Telemetry:   CAPILLARY BLOOD GLUCOSE      POCT Blood Glucose.: 153 mg/dL (16 Oct 2023 17:15)  POCT Blood Glucose.: 111 mg/dL (16 Oct 2023 11:01)  POCT Blood Glucose.: 212 mg/dL (16 Oct 2023 05:13)  POCT Blood Glucose.: 266 mg/dL (15 Oct 2023 23:24)    I&O's Summary    15 Oct 2023 07:01  -  16 Oct 2023 07:00  --------------------------------------------------------  IN: 920 mL / OUT: 0 mL / NET: 920 mL    16 Oct 2023 07:01  -  16 Oct 2023 18:48  --------------------------------------------------------  IN: 400 mL / OUT: 700 mL / NET: -300 mL        PHYSICAL EXAM:  GENERAL: NAD, well-developed  HEAD:  Atraumatic, Normocephalic  EYES: EOMI, PERRLA, conjunctiva and sclera clear  NECK: Supple, No JVD  CHEST/LUNG: Clear to auscultation bilaterally; No wheeze  HEART: Regular rate and rhythm; No murmurs, rubs, or gallops  ABDOMEN: Soft, Nontender, Nondistended; Bowel sounds present  EXTREMITIES:  2+ Peripheral Pulses, No clubbing, cyanosis, or edema  PSYCH: AAOx3  NEUROLOGY: non-focal  SKIN: No rashes or lesions    LABS:                        9.0    15.31 )-----------( 493      ( 16 Oct 2023 07:51 )             27.2     10-16    130<L>  |  93<L>  |  37<H>  ----------------------------<  177<H>  5.1   |  27  |  1.71<H>    Ca    8.3<L>      16 Oct 2023 07:51  Phos  4.2     10-16  Mg     2.20     10-16            Urinalysis Basic - ( 16 Oct 2023 07:51 )    Color: x / Appearance: x / SG: x / pH: x  Gluc: 177 mg/dL / Ketone: x  / Bili: x / Urobili: x   Blood: x / Protein: x / Nitrite: x   Leuk Esterase: x / RBC: x / WBC x   Sq Epi: x / Non Sq Epi: x / Bacteria: x        RADIOLOGY & ADDITIONAL TESTS:    Imaging Personally Reviewed:    Consultant(s) Notes Reviewed:      Care Discussed with Consultants/Other Providers:

## 2023-10-16 NOTE — PROGRESS NOTE ADULT - SUBJECTIVE AND OBJECTIVE BOX
S:  Pt seen and examined. No overnight evnets    Medications: MEDICATIONS  (STANDING):  albuterol    0.083% 2.5 milliGRAM(s) Nebulizer every 6 hours  apixaban 5 milliGRAM(s) Oral every 12 hours  artificial tears (preservative free) Ophthalmic Solution 1 Drop(s) Both EYES every 4 hours  atorvastatin 10 milliGRAM(s) Oral at bedtime  chlorhexidine 0.12% Liquid 15 milliLiter(s) Oral Mucosa every 12 hours  chlorhexidine 2% Cloths 1 Application(s) Topical daily  dextrose 5%. 1000 milliLiter(s) (50 mL/Hr) IV Continuous <Continuous>  dextrose 5%. 1000 milliLiter(s) (100 mL/Hr) IV Continuous <Continuous>  dextrose 50% Injectable 12.5 Gram(s) IV Push once  dextrose 50% Injectable 25 Gram(s) IV Push once  dextrose 50% Injectable 25 Gram(s) IV Push once  dextrose 50% Injectable 25 Gram(s) IV Push once  epoetin odalis (EPOGEN) Injectable 38269 Unit(s) IV Push <User Schedule>  ferrous    sulfate Liquid 300 milliGRAM(s) Oral daily  glucagon  Injectable 1 milliGRAM(s) IntraMuscular once  insulin lispro (ADMELOG) corrective regimen sliding scale   SubCutaneous every 6 hours  insulin NPH human recombinant 6 Unit(s) SubCutaneous every 6 hours  levETIRAcetam  Solution 1000 milliGRAM(s) Oral daily  levETIRAcetam  Solution 250 milliGRAM(s) Oral <User Schedule>  midodrine 10 milliGRAM(s) Oral once  midodrine. 30 milliGRAM(s) Oral every 8 hours  pantoprazole  Injectable 40 milliGRAM(s) IV Push two times a day  petrolatum Ophthalmic Ointment 1 Application(s) Both EYES four times a day  sodium chloride 1 Gram(s) Oral two times a day    MEDICATIONS  (PRN):  acetaminophen   Oral Liquid .. 650 milliGRAM(s) Oral every 6 hours PRN Temp greater or equal to 38C (100.4F), Mild Pain (1 - 3)  dextrose Oral Gel 15 Gram(s) Oral once PRN Blood Glucose LESS THAN 70 milliGRAM(s)/deciliter  sodium chloride 0.9% lock flush 10 milliLiter(s) IV Push every 1 hour PRN Pre/post blood products, medications, blood draw, and to maintain line patency       Vitals:  Vital Signs Last 24 Hrs  T(C): 36.2 (16 Oct 2023 20:00), Max: 37.4 (16 Oct 2023 12:00)  T(F): 97.1 (16 Oct 2023 20:00), Max: 99.4 (16 Oct 2023 16:00)  HR: 110 (16 Oct 2023 20:00) (101 - 124)  BP: 126/57 (16 Oct 2023 20:00) (93/46 - 132/53)  BP(mean): 78 (16 Oct 2023 20:00) (78 - 78)  RR: 24 (16 Oct 2023 20:00) (18 - 24)  SpO2: 100% (16 Oct 2023 20:00) (90% - 100%)    Parameters below as of 16 Oct 2023 20:00  Patient On (Oxygen Delivery Method): ventilator    O2 Concentration (%): 35        Neurological Exam:  Mental Status: Eyes closed, off sedation. Does not follow commands,  + trach to vent and NGT   Cranial Nerves: PERRL slightly sluggish, L subconjunctival hemorrhage  Motor: Moves upper extremities spontaneously, tremor R > L  no movement noted in lowers  Sensation: WD to noxious x4    I personally reviewed the below data/images/labs:       LABS:                          9.0    15.31 )-----------( 493      ( 16 Oct 2023 07:51 )             27.2     10-16    130<L>  |  93<L>  |  37<H>  ----------------------------<  177<H>  5.1   |  27  |  1.71<H>    Ca    8.3<L>      16 Oct 2023 07:51  Phos  4.2     10-16  Mg     2.20     10-16          Urinalysis Basic - ( 16 Oct 2023 07:51 )    Color: x / Appearance: x / SG: x / pH: x  Gluc: 177 mg/dL / Ketone: x  / Bili: x / Urobili: x   Blood: x / Protein: x / Nitrite: x   Leuk Esterase: x / RBC: x / WBC x   Sq Epi: x / Non Sq Epi: x / Bacteria: x        < from: CT Head No Cont (08.15.23 @ 17:20) >    ACC: [id number] EXAM:  CT BRAIN   ORDERED BY: MINAL SAM     PROCEDURE DATE:  08/15/2023          INTERPRETATION:  Clinical indication: Change in neuro exam.    Multiple axial sections were performed from base to vertex without   contrast enhancement. Coronal and sagittal reconstructions were   reformatted well.    This exam is compared prior head CT performed on August 11, 2023    Parenchymal volume loss and chronic microvessel ischemic changes are   again seen.    Abnormal low-attenuation involving the left occipital cortical   subcortical region is again seen. 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    No evidence of acute hemorrhage mass or mass effect is seen.    Evaluation of the osseous structures with appropriate window demonstrates   sclerotic changes about the left mastoid region which appears stable.   Opacification left middle ear region is again seen.    Patient is status post bilateral cataract surgery.    Impression: Stable exam.    < end of copied text >    vEEG:    Clinical Impression:  - Severe diffuse non-specific cerebral dysfunction  - There were no epileptiform abnormalities or seizures recorded.        CTH 8/11:    VENTRICLES AND SULCI: Age-appropriate involutional change  INTRA-AXIAL:  Old left PCA infarct as seen on the prior unchanged.   Microvascular ischemic changes involving the periventricular and   subcortical white matter as seen previously  EXTRA-AXIAL:  No mass or collection is seen.  VISUALIZED SINUSES:  Clear.  VISUALIZED MASTOIDS: Left mastoid sclerosis  CALVARIUM: Infiltrative appearance tothe calvarium may be indicative of   marrow infiltration on the basis of patient's known diagnosis of breast   cancer. MISCELLANEOUS:  None.    IMPRESSION:  No significant interval change compared with 7/17/2023 in   left PCA infarct which is old. Microvascular ischemic changes involving   the periventricular and subcortical white matter as seen   previously.Questionable lesions at the level of the calvarium related to   possible breast CA. Clinical correlation recommended.    --- End of Report ---      ct< from: CT Head No Cont (09.26.23 @ 21:02) >  IMPRESSION: Vague questionable areas of hypoattenuation within the   bilateral cerebellar hemispheres which may be compatible with   acute/subacute ischemia. Recommend further evaluation with a brain MRI   study, provided there are no MRI contraindications.    No acute intracranial hemorrhage.    Previously seen questionable vague wedge-shaped area of hypoattenuation   in the left parietal lobe with artifactual secondary to motion and volume   averaging with a prominent sulcus.    Chronic left occipital lobe infarct.    < end of copied text >    < from: CT Head No Cont (10.03.23 @ 20:46) >    IMPRESSION:    No acute intracranial hemorrhage or mass effect. Evaluation of the   posterior fossa degraded by streak artifact from dental amalgam.   Lucencies in the bilateral cerebellum may be artifactual.    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    Bilateral middle ear and mastoid effusions which are also seen on prior   exam.    EEG Classification / Summary:  Abnormal EEG in the awake, drowsy states.   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   -No seizures

## 2023-10-16 NOTE — PROGRESS NOTE ADULT - ASSESSMENT
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 family declined renal biopsy started on prednisone  s/p trach 9/1 and BAL with MSSA   ET cx with ESBL and MSSA  blood cx 9/1: MSSA   repeat negative 9/4, 9/8  L ear edema and otitis externa, the last cx showed candida and ENT stated it could be fungal (saw black spores?) so was also given fluconazole  pt had a rash again, unclear what caused it, fluconazole  still with intermittent fevers and then sputum cx with carbapenem resistant pseudomonas    initial resp failure for ?fluid ovrer load but also had MSSA in the sputum, got vanco, aztreonam one day then cefepime and had rash, renal failure which was considered due to cefepime and then s/p trach and bronc on 9/1 with MSSA bacteremia and BAL also MSSA,   hypotension, MSSA bacteremia likely due to pneumonia, repeat blood cx negative 9/4, TTE limited unable to exclude endocarditis, s/p a 4 week course of vanco then dapto through 10/2  L otitis externa since 9/1 but worsening edema and black discoloration with bullae forming and persistent fever (ear cx was negative), Ct showed acute on chronic otitis externa and otomastoiditis , superimposed cholesteatoma can not be excluded, no malignant otitis externa., ENT stated there was black spores which could be a fungal infection and the last cx showed candida albicans s/p 7 days of fluconazole   new erythematous patchy rash, did not improved after discontinuing the ana cristina and vanco, likely due to flucoanzole  also hypotensive now and ?uveitis vs endophthalmitis, head CT with acute/subacute ischemia and old occipital infarct, chest/abd CT with unchanged  RUL consolidation, decreased BEVERLY consolidation  pt uremic as well, s/p shiley and resumed on HD 9/27  s/p bronc with excessive dynamic airway collapse s/p trach change and some improvement  pt febrile and tachy on 9/29, sputum cx with  and proteus and carbapenem resistant pseudomonas (S to zosyn)   ana cristina was switched to zosyn 10/1, again with rash 10/2, fever and increasing WBC eos, so switched to aztreonam 10/3, but repeat sputum cx with pseudomonas also I to aztreonam, amikacin, polymixin and R to recarbrio, s/p a course of polymixin  again drop in Hgb s/p transfusion then fever 10/15 with increased leukocytosis, ?pneumonia vs non infectious fever     * f/u the blood and sputum cx  * if fever or worsening status start aztreonam  * monitor CBC/diff and CMP        The above assessment and plan was discussed with the primary team    Yumiko Conner MD  contact on teams  After 5pm and on weekends call [phone]

## 2023-10-16 NOTE — PROGRESS NOTE ADULT - ASSESSMENT
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 While in MICU patient noted with worsening renal failure requiring HD initiation, CGE/ Melena s/p EGD 8/31 found with esophagitis and erosive gastropathy, new right cerebellar infarct and fevers second to ESBL COLI and MSSA VAP, MSSA bacteremia, left ear otitis externa and drug rxn to cephalosporins. 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 Case discussed at length renal and given good UOP attempted renal recovery, however failed, and upon reinitiation of HD attempt. R IJ SHILEY failed. Attempted volume removal with Bumex GTT however course complicated by hypotension requiring transfer back to MICU 9/26 for pressor support. Diuresis dc'ed, pressors weaned off and stress steroids started. L IJ SHILEY placed (9/30) and HD reinstated. Course further complicated by AOCD and  PSA and Proteus VAP. Patient transferred back to RCU 10/1 with course complicated by volume overload and grossly resistant organisms.    NEUROLOGY  # NEW cerebella infarct   - Baseline MS AOX3 with short term memory changes per family however noted with concern for poor mentation in ICU  - MRI (8/17) with NEW right cerebellar infarct and old left PCA/occipital infarcts  - STEPHANIE (8/17) with AV showing two linear mobile echogenic elements attached to valve leaflets consistent with Lambel's excrescence, but no TRINITY thrombus, and lambel's excrescence with uncertain clinical implication.   - EEG (8/11-8/15) with no seizures   - ILR interrogation (9/7) with SR and PACs falsely recorded as AF.   - Continue on ASA and lipitor for medical management   - DC ASA per neuro - on Eliquis again     # Seizures   - Course complicated by questionable head and body shaking with prolactin elevated 9/8. Discussed for spot EEG however held given low likelihood of seizures noted and acute respiratory illnesses   - Course further complicated with right arm twitching and RPT EEG (10/4) with no seizure however but noted with increase seizure potential in left posterior quadrants.   - RPT EEG (10/5) with possible focal seizures and risk of focal-onset seizures from multiple locations, most prominent in the left posterior temporal/posterocentral region  - RPT EEG (10/6) with RUE twitching are likely non-epileptic in nature, however risk of focal-onset seizures from multiple locations  - Continue on Keppra PPX     # Metabolic vs Uremic Encephalopathy   - Lethargy noted with progression to stupor thought to be second to uremia.   - RPT CTH (9/26) with vague areas of hypoattenuation within the bilateral cerebellar hemispheres which may be compatible with acute/subacute ischemia.   - Discussed CTH with neurology and no signs of new infarct noted.   - HD reinstated in ICU with improvement in uremia however mentation remained   - Poor mentation likely in setting of toxic encephalopathy in setting of infections.     CARDIOVASCULAR  # HTN Urgency   # Septic vs vasoplegic shock   - Labile BPs ranging in between SBP 80s-200s and attempted labetalol, however noted with hypotension and bradycardia likely from BB toxicity vs shock state?    - Holding Labetalol, Catapres and Catapres.    - Attempted volume removal with bumex diuresis however noted with return of shock state requiring pressor support and transfer back to ICU.   - Pressors weaned off to stress dose (last day 10/4) and Midodrine   - Continue on Midodrine 30 TID (9/29 - ) and ensure timed 30-60 minutes prior to HD  - DO NOT HOLD MIDODRINE   - BP improved s/p albumin     # Hx of HFpEF with likely ADHF with volume overload.   - ECHO (7/2023) with EF 59 with severe LVH and diastolic dysfunction   - STEPHANIE (8/18) with normal LVRVSF however noted with increased LA pressures and persistent LA septal bowing into RA.   - ECHO (8/11) with EF 60 with mild LVH, normal LVRVSF, and mild ddfxn.  - Grossly volume overloaded and removing volume with HD midodrine assisted sessions     HEENT   # Left ear OE with acute on chronic left-sided otomastoiditis  - ENT evaluation (9/7) with no mastoid tenderness, however found with positive swelling and excoriation to left auricle and tenderness to manipulation of auricle. Debrided crusting of auricle and EAC evaluated with edema and unable to visualize TM. EAC debrided and found with watery exudate.   - CT IAC with acute on chronic left-sided otitis externa and acute on chronic left-sided otomastoiditis. Superimposed left-sided tympanosclerosis. Superimposed cholesteatoma formation within the left tympanomastoid cavity cannot be excluded  - Completed CiproHC (9/5 - 9/16), Bradford (9/1-10/1) and acetic acid and bacitracin.   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   - Continue on empiric valtrex 500mg BID (9/29 - 10/8) per ophtho and ID  - Continue preservative free artificial tears 4 times a day in the both eye  - Continue lacrilube ointment twice a day in the both eyes   - Ophthalmology reconsulted (10/12) given L-eye injection     # Oral Lesions with questionable Zoster vs Trauma?   - Patient with tongue pain and seen with anterior ulcerations consolidating and crusting and posterior ulceration.  - Case discussed with pathology resident and culture/ cytology sent.   - HSV negative and Path (9/6) with normal appearing epithelial cells singly and in clusters devoid of viral cytopathic effect.   - Continue on magic mouth wash for pain    RESPIRATORY  # AHHRF second to volume overload   - Hx of CKD and HFpEF presented with SOB and hypercarbia second to volume overload requiring intubation and HD initiation   - Vent weaning attempted however limited requiring tracheostomy (9/1) with IP   - Course complicated by PIPs elevated and found with tracheomalacia and volume overloaded.   - CT CHEST (9/8) with tracheomalacia, BL pleural effusions and continued consolidations   - Continue on vent and given TBM increased PEEP to 8 with improvement in dynamic collapse, but PIP waxes and wanes with volume overloaded and secretions.  - Continue on albuterol and chest PT  - Continue volume removal with HD   - Attempted HTS but noted with hemoptysis and held   - Hold Atrovent given thick secretions   - Hold IPV given high inspiratory pressures   - Vent changed to PS with improvement - VBG good   - Had inc wob /hypoxic poor volumes today placed back on volume control but pressures increasing pt required positioning to help with volumes - back on pressure control with better volumes /dec pressure   - Rpeat ABG done -ok      GI  # UGIB   - CGE x 1 episode on 8/24 and melena x 2 episodes on 8/31 likely residual blood.   - EGD (8/31) found with esophagitis and erosive gastropathy  - Continue on PPI BID through late October and then PPI QD     # Dysphagia   - NGT-TF continued   - Pending PEG however needs improvement prior to placement.     RENAL  # Progressive CKD   - Presented volume overload and progressive RUSS on CKD   - POCUS with no signs of hydronephrosis in ICU  - Pressor support started with improvement in renal function in ICU  - Attempted steroid course in ICU without improvement in renal function   - Case discussed at length with renal and initiated on HD in ICU   - Remain on HD while in RCU however noted to be volume overloaded. Attempted Bumex pushes and patient noted with good UOP.   - Attempted renal recovery in RCU however noted with decent UOP, but BUN/ CRE continued to rise without improvement on diuresis.   - Ultimately resumed on HD MWF TIW with midodrine assisted volume removal, however pressure remains soft with difficulty removing aggressive fluids.   - D/w Renal for additional UF session (10/12)    # Hyponatremia   - Continue on salt tabs 1G  (decreased 10/6) and weaning off as tolerated with volume removal   - Monitor sodium     # Hyperphosphatemia to Hypophosphatemia with HD  - PTH normal and elevated phos likely from renal failure  - Phos binder with Renvela started with improvement however then noted with hypophosphatemia and Renvela stopped.   - required replacment KPHOS today with good response - continue to monitor     INFECTIOUS DISEASE  # ESBL ECOLI and MSSA VAP c/b MSSA bacteremia   - RVP/ COVID (8/11) negative   - SCx (8/11) with MSSA s/p cefepime (8/12-8/13) and then ancef (8/14) however noted with drug reaction and then changed to vanco and aztreonam (8/12-8/13) in ICU .   - Course complicated by recurrent fevers and return of MSSA with bacteremia.   - SCx (9/1) with MSSA and ESBL ECOLI   - BAL (9/1) with MSSA   - BCx (9/1) with MSSA and cleared on (9/4)   - ECHO (9/6) unable to rule out endocarditis   - Continue on Vanco (9/4-9/22) however noted with rashes of uncertain etiology and replaced with Bradford (9/4 - 10/2) and DAPTO (9/26-10/2) for completion.     # Proteus and  PSA VAP/ Trachitis   - Continued fevers and SCx (9/29) with MDR  PSA and Proteus   - Continued on Bradford as above however noted with resistance and changed to Zosyn (10/2 - 10/5) with course complicated by possible drug rxn. Zosyn changed to Aztreonam (10/5 - 10/6) however RPT SCx (10/3) with  PSA however only intermediate coverage to aztreonam and amikacin.  PSA grossly resistant to other antibiotics other than cephalosporins however patient with reactions in the past. Case discussed with ID and ID pharmacist and change to Polymyxin (10/6 - ) however continues to spike fevers likely second to Polymyxin only intermediate coverage and Recarbio resistant .   - Overall difficulty with ABX tailoring given allergic rxns and resistant organisms.   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  - Monitor HH     VASCULAR   # LLE POP DVT   - US BL LE (9/10) with acute left deep vein thrombosis of the left popliteal vein.  - RUE swollen and VA DOPPLER RUE (10/3) with R IJ DVT and R brachial DVT.  - Eliquis held for permacath however procedure held.   - Eliquis remains on hold second to mild suction trauma hemoptysis.   - Resumed Eliquis     # HD access  - L IJ CLAUDIA (9/27 - )   - Planned for IR permacath cancelled for today and will reattempt when infectious status improves.     ONC   # Hx of Breast CA   - Patient dx in 2018 and s/p BL mastectomy (radical on right) and chemo and RT.   - Supportive care.     ENDOCRINE  # IDDM2   - Continued on NPH with ISS, however noted with hypoglycemic episodes and NPH dc'ed.    - Finger sticks trending up off NPH.   - Continue on NPH 6U with moderate ISS.   - Adjust as need     SKIN  # Drug Eruption   - Attempted cefepime (8/12) vs ancef (8/13-8/14) and then noted with drug rxn in ICU. Continue on steroids with prednisone 40 (8/18 - 9/2) then prednisone 30 (9/3-9/7), then prednisone 20 (9/8 - 9/10), then prednisone 10mg (9/11-9/13) then turn off.   - Attempted Zosyn (10/2-10/5) with possible rash. Zosyn turned off with improvement.   - Hold further cephalosporins or PCNs at this time   - Monitor for further drug rxns.     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 While in MICU patient noted with worsening renal failure requiring HD initiation, CGE/ Melena s/p EGD 8/31 found with esophagitis and erosive gastropathy, new right cerebellar infarct and fevers second to ESBL COLI and MSSA VAP, MSSA bacteremia, left ear otitis externa and drug rxn to cephalosporins. 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 Case discussed at length renal and given good UOP attempted renal recovery, however failed, and upon reinitiation of HD attempt. R IJ SHILEY failed. Attempted volume removal with Bumex GTT however course complicated by hypotension requiring transfer back to MICU 9/26 for pressor support. Diuresis dc'ed, pressors weaned off and stress steroids started. L IJ SHILEY placed (9/30) and HD reinstated. Course further complicated by AOCD and  PSA and Proteus VAP. Patient transferred back to RCU 10/1 with course complicated by volume overload and grossly resistant organisms.    NEUROLOGY  # NEW cerebella infarct   - Baseline MS AOX3 with short term memory changes per family however noted with concern for poor mentation in ICU  - MRI (8/17) with NEW right cerebellar infarct and old left PCA/occipital infarcts  - STEPHANIE (8/17) with AV showing two linear mobile echogenic elements attached to valve leaflets consistent with Lambel's excrescence, but no TRINITY thrombus, and lambel's excrescence with uncertain clinical implication.   - EEG (8/11-8/15) with no seizures   - ILR interrogation (9/7) with SR and PACs falsely recorded as AF.   - Continue on ASA and lipitor for medical management   - DC ASA per neuro - on Eliquis again     # Seizures   - Course complicated by questionable head and body shaking with prolactin elevated 9/8. Discussed for spot EEG however held given low likelihood of seizures noted and acute respiratory illnesses   - Course further complicated with right arm twitching and RPT EEG (10/4) with no seizure however but noted with increase seizure potential in left posterior quadrants.   - RPT EEG (10/5) with possible focal seizures and risk of focal-onset seizures from multiple locations, most prominent in the left posterior temporal/posterocentral region  - RPT EEG (10/6) with RUE twitching are likely non-epileptic in nature, however risk of focal-onset seizures from multiple locations  - Continue on Keppra PPX     # Metabolic vs Uremic Encephalopathy   - Lethargy noted with progression to stupor thought to be second to uremia.   - RPT CTH (9/26) with vague areas of hypoattenuation within the bilateral cerebellar hemispheres which may be compatible with acute/subacute ischemia.   - Discussed CTH with neurology and no signs of new infarct noted.   - HD reinstated in ICU with improvement in uremia however mentation remained   - Poor mentation likely in setting of toxic encephalopathy in setting of infections.     CARDIOVASCULAR  # HTN Urgency   # Septic vs vasoplegic shock   - Labile BPs ranging in between SBP 80s-200s and attempted labetalol, however noted with hypotension and bradycardia likely from BB toxicity vs shock state?    - Holding Labetalol, Catapres and Catapres.    - Attempted volume removal with bumex diuresis however noted with return of shock state requiring pressor support and transfer back to ICU.   - Pressors weaned off to stress dose (last day 10/4) and Midodrine   - Continue on Midodrine 30 TID (9/29 - ) and ensure timed 30-60 minutes prior to HD  - DO NOT HOLD MIDODRINE   - BP improved s/p albumin     # Hx of HFpEF with likely ADHF with volume overload.   - ECHO (7/2023) with EF 59 with severe LVH and diastolic dysfunction   - STEPHANIE (8/18) with normal LVRVSF however noted with increased LA pressures and persistent LA septal bowing into RA.   - ECHO (8/11) with EF 60 with mild LVH, normal LVRVSF, and mild ddfxn.  - Grossly volume overloaded and removing volume with HD midodrine assisted sessions     HEENT   # Left ear OE with acute on chronic left-sided otomastoiditis  - ENT evaluation (9/7) with no mastoid tenderness, however found with positive swelling and excoriation to left auricle and tenderness to manipulation of auricle. Debrided crusting of auricle and EAC evaluated with edema and unable to visualize TM. EAC debrided and found with watery exudate.   - CT IAC with acute on chronic left-sided otitis externa and acute on chronic left-sided otomastoiditis. Superimposed left-sided tympanosclerosis. Superimposed cholesteatoma formation within the left tympanomastoid cavity cannot be excluded  - Completed CiproHC (9/5 - 9/16), Bradford (9/1-10/1) and acetic acid and bacitracin.   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   - Continue on empiric valtrex 500mg BID (9/29 - 10/8) per ophtho and ID  - Continue preservative free artificial tears 4 times a day in the both eye  - Continue lacrilube ointment twice a day in the both eyes   - Ophthalmology reconsulted (10/12) given L-eye injection     # Oral Lesions with questionable Zoster vs Trauma?   - Patient with tongue pain and seen with anterior ulcerations consolidating and crusting and posterior ulceration.  - Case discussed with pathology resident and culture/ cytology sent.   - HSV negative and Path (9/6) with normal appearing epithelial cells singly and in clusters devoid of viral cytopathic effect.   - Continue on magic mouth wash for pain    RESPIRATORY  # AHHRF second to volume overload   - Hx of CKD and HFpEF presented with SOB and hypercarbia second to volume overload requiring intubation and HD initiation   - Vent weaning attempted however limited requiring tracheostomy (9/1) with IP   - Course complicated by PIPs elevated and found with tracheomalacia and volume overloaded.   - CT CHEST (9/8) with tracheomalacia, BL pleural effusions and continued consolidations   - Continue on vent and given TBM increased PEEP to 8 with improvement in dynamic collapse, but PIP waxes and wanes with volume overloaded and secretions.  - Continue on albuterol and chest PT  - Continue volume removal with HD   - Attempted HTS but noted with hemoptysis and held   - Hold Atrovent given thick secretions   - Hold IPV given high inspiratory pressures   - Vent changed to PS with improvement - VBG good   - Had inc wob /hypoxic poor volumes today placed back on volume control but pressures increasing pt required positioning to help with volumes - back on pressure control with better volumes /dec pressure   - Rpeat ABG done -ok      GI  # UGIB   - CGE x 1 episode on 8/24 and melena x 2 episodes on 8/31 likely residual blood.   - EGD (8/31) found with esophagitis and erosive gastropathy  - Continue on PPI BID through late October and then PPI QD     # Dysphagia   - NGT-TF continued   - Pending PEG however needs improvement prior to placement.     RENAL  # Progressive CKD   - Presented volume overload and progressive RUSS on CKD   - POCUS with no signs of hydronephrosis in ICU  - Pressor support started with improvement in renal function in ICU  - Attempted steroid course in ICU without improvement in renal function   - Case discussed at length with renal and initiated on HD in ICU   - Remain on HD while in RCU however noted to be volume overloaded. Attempted Bumex pushes and patient noted with good UOP.   - Attempted renal recovery in RCU however noted with decent UOP, but BUN/ CRE continued to rise without improvement on diuresis.   - Ultimately resumed on HD MWF TIW with midodrine assisted volume removal, however pressure remains soft with difficulty removing aggressive fluids.   - D/w Renal for additional UF session (10/12)    # Hyponatremia   - Continue on salt tabs 1G  (decreased 10/6) and weaning off as tolerated with volume removal   - Monitor sodium     # Hyperphosphatemia to Hypophosphatemia with HD  - PTH normal and elevated phos likely from renal failure  - Phos binder with Renvela started with improvement however then noted with hypophosphatemia and Renvela stopped.   - required replacment KPHOS today with good response - continue to monitor     INFECTIOUS DISEASE  # ESBL ECOLI and MSSA VAP c/b MSSA bacteremia   - RVP/ COVID (8/11) negative   - SCx (8/11) with MSSA s/p cefepime (8/12-8/13) and then ancef (8/14) however noted with drug reaction and then changed to vanco and aztreonam (8/12-8/13) in ICU .   - Course complicated by recurrent fevers and return of MSSA with bacteremia.   - SCx (9/1) with MSSA and ESBL ECOLI   - BAL (9/1) with MSSA   - BCx (9/1) with MSSA and cleared on (9/4)   - ECHO (9/6) unable to rule out endocarditis   - Continue on Vanco (9/4-9/22) however noted with rashes of uncertain etiology and replaced with Bradford (9/4 - 10/2) and DAPTO (9/26-10/2) for completion.     # Proteus and  PSA VAP/ Trachitis   - Continued fevers and SCx (9/29) with MDR  PSA and Proteus   - Continued on Bradford as above however noted with resistance and changed to Zosyn (10/2 - 10/5) with course complicated by possible drug rxn. Zosyn changed to Aztreonam (10/5 - 10/6) however RPT SCx (10/3) with  PSA however only intermediate coverage to aztreonam and amikacin.  PSA grossly resistant to other antibiotics other than cephalosporins however patient with reactions in the past. Case discussed with ID and ID pharmacist and change to Polymyxin (10/6 - ) however continues to spike fevers likely second to Polymyxin only intermediate coverage and Recarbio resistant .   - Overall difficulty with ABX tailoring given allergic rxns and resistant organisms.   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  - Developed chest rash s/p PRBC transfusion (10/14-10/15) and after workup/discussion with Blood Bank it was deemed this was likely a mild allergic transfusion reaction   - PREMEDICATE WITH H1 OR H2 RECEPTOR BLOCKER PRIOR TO FUTURE TRANSFUSIONS         VASCULAR   # LLE POP DVT   - US BL LE (9/10) with acute left deep vein thrombosis of the left popliteal vein.  - RUE swollen and VA DOPPLER RUE (10/3) with R IJ DVT and R brachial DVT.  - Eliquis held for permacath however procedure held.   - Eliquis remains on hold second to mild suction trauma hemoptysis.   - Resumed Eliquis     # HD access  - L IJ SHILEY (9/27 - )   - Planned for IR permacath cancelled for today and will reattempt when infectious status improves.     ONC   # Hx of Breast CA   - Patient dx in 2018 and s/p BL mastectomy (radical on right) and chemo and RT.   - Supportive care.     ENDOCRINE  # IDDM2   - Continued on NPH with ISS, however noted with hypoglycemic episodes and NPH dc'ed.    - Finger sticks trending up off NPH.   - Continue on NPH 6U with moderate ISS.   - Adjust as need     SKIN  # Drug Eruption   - Attempted cefepime (8/12) vs ancef (8/13-8/14) and then noted with drug rxn in ICU. Continue on steroids with prednisone 40 (8/18 - 9/2) then prednisone 30 (9/3-9/7), then prednisone 20 (9/8 - 9/10), then prednisone 10mg (9/11-9/13) then turn off.   - Attempted Zosyn (10/2-10/5) with possible rash. Zosyn turned off with improvement.   - Hold further cephalosporins or PCNs at this time   - Monitor for further drug rxns.     ETHICS/ GOC    - Attempted palliative discussion however family not interested and wishes for FULL CODE    DISPO - TBD   ************************

## 2023-10-16 NOTE — PROGRESS NOTE ADULT - NS ATTEND AMEND GEN_ALL_CORE FT
agree with above  daughter and  at bedside  nephrology and cardiology f/u appreciated  kft replacement. midodrine  monitor rash, looks reactive. f/u bloodbank  off abx, polym completed  overall prognosis is very poor

## 2023-10-17 NOTE — PROGRESS NOTE ADULT - ASSESSMENT
74 y/[facility] F [facility] w aspiration PNA, was treated w CEFEPIME, developed an allergic rash,  dCHF, + MAC on AFB culture, had been progressively getting more and more lethargic and dyspneic at home since DC.   In  am of 8/11/23  ptn presented with respiratory distress w hypoxia and hypercarbia requiring intubation 2/2 volume overload +/- Asp PNA      Neuro   not responsive  Baseline MS AOx3, aphasic   - h/o CVA , on aspirin and statin . resumed w feeding tube, ASA resumed 8/26  - eeg  2/2 tremors, no sz focus, on KEPPRA  - less responsive in the past few days  - MRI 8/17:  new R cerebellar infarct, old left PCA/Occipital infarct. probably embolic in nature, did not tolerate full AC in the past, STEPHANIE is neg , no shunts observed  - ptn is poorly reactive, rpt scan done, no further CVA seen.     Cardiac   cardiology following  CHFpEF   TTE 7/2023 with EF 59%, with severe LVH and diastolic dysfunction       Pulmonary   Acute hypercapnic and hypoxemic respiratory failure   prolonged intubation, now  trache to  vent, has copious secretions      Renal   on HD via L IJ Shiley, awaiting tunneled catheter when cleared by RCU team  HD MWF, midodrine assisted, PUF in between HD days, unable to remove proper volume 2/2 hypotension.   permacath when clinically stable      GI  NPO  on tube feeds  coffee ground emesis x 1 8/24, melena overnight 8/31, s/p 1UPRBC, EGD/Colonoscopy: erosive gastropathy, esophagisits, on colonoscopy old blood seen no active bleeding, poor prep  family to decide re PEG placement    Endocrine  on Insulin: NPH, Admelog    ID   complicated infectious hospital course  treated for MSSA bacteremia, aspiration PNA.  sputum + for pseudomonas, ptn was initially on zosyn but was allergic, then was switched to aztreonam   Aztreonam was switched to polymyxin for a possible allergy but ptn didnt have a reaction to aztreonam, she had a reaction to Zosyn.   would change polymyxin back to aztreonam as ptn is spiking temps through polymyxin. asked ID to re-evaluate     ID re-evaluated on 10/16, overnight on 10/17 febrile again, would panculture including sputum cx, start AZACTAM as per ID recs    ID course complicated with muliple ABx allergies  also treated for Left O. 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 and she remains full code at present

## 2023-10-17 NOTE — PROGRESS NOTE ADULT - SUBJECTIVE AND OBJECTIVE BOX
Patient is a 73y old  Female who presents with a chief complaint of Respiratory distress (17 Oct 2023 08:06)      SUBJECTIVE / OVERNIGHT EVENTS: febrile overnight. would panculture including sputum, start AZACTAM    MEDICATIONS  (STANDING):  albuterol    0.083% 2.5 milliGRAM(s) Nebulizer every 6 hours  apixaban 5 milliGRAM(s) Oral every 12 hours  artificial tears (preservative free) Ophthalmic Solution 1 Drop(s) Both EYES every 4 hours  atorvastatin 10 milliGRAM(s) Oral at bedtime  aztreonam  IVPB 2000 milliGRAM(s) IV Intermittent <User Schedule>  chlorhexidine 0.12% Liquid 15 milliLiter(s) Oral Mucosa every 12 hours  chlorhexidine 2% Cloths 1 Application(s) Topical daily  dextrose 5%. 1000 milliLiter(s) (50 mL/Hr) IV Continuous <Continuous>  dextrose 5%. 1000 milliLiter(s) (100 mL/Hr) IV Continuous <Continuous>  dextrose 50% Injectable 12.5 Gram(s) IV Push once  dextrose 50% Injectable 25 Gram(s) IV Push once  dextrose 50% Injectable 25 Gram(s) IV Push once  dextrose 50% Injectable 25 Gram(s) IV Push once  epoetin odalis (EPOGEN) Injectable 50605 Unit(s) IV Push <User Schedule>  ferrous    sulfate Liquid 300 milliGRAM(s) Oral daily  glucagon  Injectable 1 milliGRAM(s) IntraMuscular once  insulin lispro (ADMELOG) corrective regimen sliding scale   SubCutaneous every 6 hours  insulin NPH human recombinant 6 Unit(s) SubCutaneous every 6 hours  levETIRAcetam  Solution 1000 milliGRAM(s) Oral daily  levETIRAcetam  Solution 250 milliGRAM(s) Oral <User Schedule>  midodrine 10 milliGRAM(s) Oral once  midodrine. 30 milliGRAM(s) Oral every 8 hours  pantoprazole  Injectable 40 milliGRAM(s) IV Push two times a day  petrolatum Ophthalmic Ointment 1 Application(s) Both EYES four times a day  sodium chloride 1 Gram(s) Oral two times a day    MEDICATIONS  (PRN):  acetaminophen   Oral Liquid .. 650 milliGRAM(s) Oral every 6 hours PRN Temp greater or equal to 38C (100.4F), Mild Pain (1 - 3)  dextrose Oral Gel 15 Gram(s) Oral once PRN Blood Glucose LESS THAN 70 milliGRAM(s)/deciliter  sodium chloride 0.9% Bolus. 250 milliLiter(s) IV Bolus every 5 minutes PRN SBP LESS THAN or EQUAL to 90 mmHg  sodium chloride 0.9% lock flush 10 milliLiter(s) IV Push every 1 hour PRN Pre/post blood products, medications, blood draw, and to maintain line patency      Vital Signs Last 24 Hrs  T(F): 99.1 (10-17-23 @ 08:07), Max: 101.1 (10-17-23 @ 05:02)  HR: 110 (10-17-23 @ 06:58) (100 - 124)  BP: 109/47 (10-17-23 @ 08:07) (93/46 - 132/53)  RR: 24 (10-17-23 @ 08:07) (20 - 24)  SpO2: 99% (10-17-23 @ 08:07) (94% - 100%)  Telemetry:   CAPILLARY BLOOD GLUCOSE      POCT Blood Glucose.: 196 mg/dL (17 Oct 2023 04:46)  POCT Blood Glucose.: 188 mg/dL (16 Oct 2023 23:33)  POCT Blood Glucose.: 153 mg/dL (16 Oct 2023 17:15)  POCT Blood Glucose.: 111 mg/dL (16 Oct 2023 11:01)    I&O's Summary    16 Oct 2023 07:01  -  17 Oct 2023 07:00  --------------------------------------------------------  IN: 730 mL / OUT: 700 mL / NET: 30 mL        PHYSICAL EXAM:  GENERAL: NAD, well-developed  HEAD:  Atraumatic, Normocephalic  EYES: EOMI, PERRLA, conjunctiva and sclera clear  NECK: Supple, No JVD  CHEST/LUNG: Clear to auscultation bilaterally; No wheeze  HEART: Regular rate and rhythm; No murmurs, rubs, or gallops  ABDOMEN: Soft, Nontender, Nondistended; Bowel sounds present  EXTREMITIES:  2+ Peripheral Pulses, No clubbing, cyanosis, or edema  PSYCH: AAOx3  NEUROLOGY: non-focal  SKIN: No rashes or lesions    LABS:                        7.5    14.17 )-----------( 467      ( 17 Oct 2023 07:02 )             23.5     10-17    131<L>  |  95<L>  |  36<H>  ----------------------------<  231<H>  4.9   |  24  |  1.73<H>    Ca    8.1<L>      17 Oct 2023 07:02  Phos  3.8     10-17  Mg     2.20     10-17            Urinalysis Basic - ( 17 Oct 2023 07:02 )    Color: x / Appearance: x / SG: x / pH: x  Gluc: 231 mg/dL / Ketone: x  / Bili: x / Urobili: x   Blood: x / Protein: x / Nitrite: x   Leuk Esterase: x / RBC: x / WBC x   Sq Epi: x / Non Sq Epi: x / Bacteria: x        RADIOLOGY & ADDITIONAL TESTS:    Imaging Personally Reviewed:    Consultant(s) Notes Reviewed:      Care Discussed with Consultants/Other Providers:

## 2023-10-17 NOTE — PROGRESS NOTE ADULT - ASSESSMENT
73 f with DM, HTN, CKD, breast CA, latent TB (treated first with INH then had rash and switched to rifampin), MSSA bacteremia, c-diff, respiratory arrest and cardiac arrest (2018), s/p trach/PEG then removed, CVA with residual weakness, aspiration PNA, 1/4 sputums had MAC 7/2023, admitted 8/11 with respiratory failure no fever or WBC but was intubated and then spiked  blood cx negative, sputum cx with MSSA  developed rash and renal failure after cefepime, was on HD then discontinued became uremic and now again on HD  head MRI 8/17 with acute cerebellar infarct  had renal failure, AIN? family declined renal biopsy started on prednisone  s/p trach 9/1 and BAL with MSSA   ET cx with ESBL and MSSA  blood cx 9/1: MSSA with hypotension  repeat negative 9/4, 9/8, TTE no veg s/p 4 weeks of vanco/dapto through 10/2  L ear edema and otitis externa, s/p a long course of ana cristina, the last cx showed candida and ENT stated it could be fungal (saw black spores?) so was also given 7 days of fluconazole  pt had a rash again, unclear what caused it, fluconazole  still with intermittent fevers and then sputum cx with carbapenem resistant pseudomonas, s/p a course of polymixin  no improvement in mental status and ?seizure as well, neuro following  again febrile 10/15-10.17with leukocytosis sputum growing pseudomonas and proteus  (both sensitive to aztreonam)       * aztreonam renally dosed, will likely complete a 10 day course  * if fever or worsening status start aztreonam  * monitor CBC/diff and CMP        The above assessment and plan was discussed with the primary team    Yumiko Conner MD  contact on teams  After 5pm and on weekends call [phone]

## 2023-10-17 NOTE — PROGRESS NOTE ADULT - NS ATTEND AMEND GEN_ALL_CORE FT
agree with above  ID , renal, neuro f/u appreciated  changed to aztreonam  blood cx sent  tolerated HD

## 2023-10-17 NOTE — PROGRESS NOTE ADULT - SUBJECTIVE AND OBJECTIVE BOX
S:  Pt seen and examined. No overnight evnets      Medications: MEDICATIONS  (STANDING):  albuterol    0.083% 2.5 milliGRAM(s) Nebulizer every 6 hours  apixaban 5 milliGRAM(s) Oral every 12 hours  artificial tears (preservative free) Ophthalmic Solution 1 Drop(s) Both EYES every 4 hours  atorvastatin 10 milliGRAM(s) Oral at bedtime  aztreonam  IVPB 2000 milliGRAM(s) IV Intermittent <User Schedule>  chlorhexidine 0.12% Liquid 15 milliLiter(s) Oral Mucosa every 12 hours  chlorhexidine 2% Cloths 1 Application(s) Topical daily  dextrose 5%. 1000 milliLiter(s) (50 mL/Hr) IV Continuous <Continuous>  dextrose 5%. 1000 milliLiter(s) (100 mL/Hr) IV Continuous <Continuous>  dextrose 50% Injectable 12.5 Gram(s) IV Push once  dextrose 50% Injectable 25 Gram(s) IV Push once  dextrose 50% Injectable 25 Gram(s) IV Push once  dextrose 50% Injectable 25 Gram(s) IV Push once  epoetin odalis (EPOGEN) Injectable 10385 Unit(s) IV Push <User Schedule>  ferrous    sulfate Liquid 300 milliGRAM(s) Oral daily  glucagon  Injectable 1 milliGRAM(s) IntraMuscular once  insulin lispro (ADMELOG) corrective regimen sliding scale   SubCutaneous every 6 hours  insulin NPH human recombinant 6 Unit(s) SubCutaneous every 6 hours  levETIRAcetam  Solution 1000 milliGRAM(s) Oral daily  levETIRAcetam  Solution 250 milliGRAM(s) Oral <User Schedule>  midodrine 10 milliGRAM(s) Oral once  midodrine. 30 milliGRAM(s) Oral every 8 hours  pantoprazole  Injectable 40 milliGRAM(s) IV Push two times a day  petrolatum Ophthalmic Ointment 1 Application(s) Both EYES four times a day  sodium chloride 1 Gram(s) Oral two times a day    MEDICATIONS  (PRN):  acetaminophen   Oral Liquid .. 650 milliGRAM(s) Oral every 6 hours PRN Temp greater or equal to 38C (100.4F), Mild Pain (1 - 3)  dextrose Oral Gel 15 Gram(s) Oral once PRN Blood Glucose LESS THAN 70 milliGRAM(s)/deciliter  sodium chloride 0.9% Bolus. 250 milliLiter(s) IV Bolus every 5 minutes PRN SBP LESS THAN or EQUAL to 90 mmHg  sodium chloride 0.9% lock flush 10 milliLiter(s) IV Push every 1 hour PRN Pre/post blood products, medications, blood draw, and to maintain line patency       Vitals:  Vital Signs Last 24 Hrs  T(C): 36.6 (17 Oct 2023 10:07), Max: 38.4 (17 Oct 2023 05:02)  T(F): 97.9 (17 Oct 2023 10:07), Max: 101.1 (17 Oct 2023 05:02)  HR: 96 (17 Oct 2023 11:37) (96 - 117)  BP: 86/55 (17 Oct 2023 11:37) (86/55 - 132/53)  BP(mean): 64 (17 Oct 2023 11:37) (61 - 78)  RR: 24 (17 Oct 2023 10:07) (24 - 24)  SpO2: 97% (17 Oct 2023 10:50) (95% - 100%)    Parameters below as of 17 Oct 2023 10:07  Patient On (Oxygen Delivery Method): ventilator      Neurological Exam:  Mental Status: Eyes closed, off sedation. Does not follow commands,  + trach to vent and NGT   Cranial Nerves: PERRL slightly sluggish, L subconjunctival hemorrhage  Motor: Moves upper extremities spontaneously, tremor R > L  no movement noted in lowers  Sensation: WD to noxious x4    I personally reviewed the below data/images/labs:       LABS:                          7.5    14.17 )-----------( 467      ( 17 Oct 2023 07:02 )             23.5     10-17    131<L>  |  95<L>  |  36<H>  ----------------------------<  231<H>  4.9   |  24  |  1.73<H>    Ca    8.1<L>      17 Oct 2023 07:02  Phos  3.8     10-17  Mg     2.20     10-17          Urinalysis Basic - ( 17 Oct 2023 07:02 )    Color: x / Appearance: x / SG: x / pH: x  Gluc: 231 mg/dL / Ketone: x  / Bili: x / Urobili: x   Blood: x / Protein: x / Nitrite: x   Leuk Esterase: x / RBC: x / WBC x   Sq Epi: x / Non Sq Epi: x / Bacteria: x      < from: CT Head No Cont (08.15.23 @ 17:20) >    ACC: [id number] EXAM:  CT BRAIN   ORDERED BY: MINAL SAM     PROCEDURE DATE:  08/15/2023          INTERPRETATION:  Clinical indication: Change in neuro exam.    Multiple axial sections were performed from base to vertex without   contrast enhancement. Coronal and sagittal reconstructions were   reformatted well.    This exam is compared prior head CT performed on August 11, 2023    Parenchymal volume loss and chronic microvessel ischemic changes are   again seen.    Abnormal low-attenuation involving the left occipital cortical   subcortical region is again seen. 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    No evidence of acute hemorrhage mass or mass effect is seen.    Evaluation of the osseous structures with appropriate window demonstrates   sclerotic changes about the left mastoid region which appears stable.   Opacification left middle ear region is again seen.    Patient is status post bilateral cataract surgery.    Impression: Stable exam.    < end of copied text >    vEEG:    Clinical Impression:  - Severe diffuse non-specific cerebral dysfunction  - There were no epileptiform abnormalities or seizures recorded.        CTH 8/11:    VENTRICLES AND SULCI: Age-appropriate involutional change  INTRA-AXIAL:  Old left PCA infarct as seen on the prior unchanged.   Microvascular ischemic changes involving the periventricular and   subcortical white matter as seen previously  EXTRA-AXIAL:  No mass or collection is seen.  VISUALIZED SINUSES:  Clear.  VISUALIZED MASTOIDS: Left mastoid sclerosis  CALVARIUM: Infiltrative appearance tothe calvarium may be indicative of   marrow infiltration on the basis of patient's known diagnosis of breast   cancer. MISCELLANEOUS:  None.    IMPRESSION:  No significant interval change compared with 7/17/2023 in   left PCA infarct which is old. Microvascular ischemic changes involving   the periventricular and subcortical white matter as seen   previously.Questionable lesions at the level of the calvarium related to   possible breast CA. Clinical correlation recommended.    --- End of Report ---      ct< from: CT Head No Cont (09.26.23 @ 21:02) >  IMPRESSION: Vague questionable areas of hypoattenuation within the   bilateral cerebellar hemispheres which may be compatible with   acute/subacute ischemia. Recommend further evaluation with a brain MRI   study, provided there are no MRI contraindications.    No acute intracranial hemorrhage.    Previously seen questionable vague wedge-shaped area of hypoattenuation   in the left parietal lobe with artifactual secondary to motion and volume   averaging with a prominent sulcus.    Chronic left occipital lobe infarct.    < end of copied text >    < from: CT Head No Cont (10.03.23 @ 20:46) >    IMPRESSION:    No acute intracranial hemorrhage or mass effect. Evaluation of the   posterior fossa degraded by streak artifact from dental amalgam.   Lucencies in the bilateral cerebellum may be artifactual.    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    Bilateral middle ear and mastoid effusions which are also seen on prior   exam.    EEG Classification / Summary:  Abnormal EEG in the awake, drowsy states.   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   -No seizures

## 2023-10-17 NOTE — PROGRESS NOTE ADULT - ASSESSMENT
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 While in MICU patient noted with worsening renal failure requiring HD initiation, CGE/ Melena s/p EGD 8/31 found with esophagitis and erosive gastropathy, new right cerebellar infarct and fevers second to ESBL COLI and MSSA VAP, MSSA bacteremia, left ear otitis externa and drug rxn to cephalosporins Course further complicated by prolonged vent time s/p tracheostomy 9/1 and transferred to RCU 9/3 completed by recurrent fevers with high PIP, respiratory acidosis and concern for volume overloaded.   CTH repeated 9/26 for concern for encephalopathy - has known now - chronic bilateral cerebellar infarcts, L PCA infarct. L parietal hypodensity seen on prior CT likely artifactual  Repeat CTH 10/3 - unchanged  EEG 10/5 with L posterocentral/temporal spikes/sharp waves - doubt true focal seizure upon further review of EEG     Impression:   ? Focal seizure - has hx of bilateral R > L tremors   Metabolic encephalopathy related to shock, infection, doubt new stroke  L PCA stroke, ESUS s/p ILR, also with bilateral centrum semiovale watershed infarcts   Multiple embolic strokes on MRI 8/17 - R cerebellum, midbrain, multiple other tiny embolic infarcts.   Dementia   MSSA bacteremia, r/o endocarditis s/p Daptomycin  Acute popliteal DVT on Eliquis   Anemia     Recommendations   [] has multiple areas of epileptogenic potential on EEG, no clear seizure correlate seen. On Keppra but no improvement in mental status  [] consider MRI brain once stable  [] mgmt of hypotension per piumary team, remains full code  [] Keppra 500mg BID with extra 250mg after HD on HD days - will consider stopping after MRI to see if impacting mental status  [] Abx per ID  [] New CTH from 9/26 and 10/3 without any evidence of acute infarct -> encephalopathy likely related to underlying metabolic derangements.  [] GI following for coffee ground emesis - esophagitis seen on colonoscopy, on ELiquis  [] family declined kidney biopsy for AIN -> s/p steroid tx   []  stop aspirin as back on Eliquis  [] C/w home Atorvastatin 10 mg qhs   [] would interrogate ILR and review tele to see if pAF -. no AF seen  [] continue to address GOC with family as pts  and daughter continue to remain hopeful    Katty Charles,   Vascular Neurology  Office [phone]

## 2023-10-17 NOTE — PROGRESS NOTE ADULT - SUBJECTIVE AND OBJECTIVE BOX
NEPHROLOGY     Patient seen and examined, trach to vent, on HD, she had HD yesterday, short treatment due to poor catheter flow and subsequent hypotension, was only able to remmove 1.1L. Today on HD, noted with episodes of intradialytic hypotension, UF goal reduced, catheter continues to have poor flow, BFR at 250 at present, with episodes of high arterial pressure.     MEDICATIONS  (STANDING):  albuterol    0.083% 2.5 milliGRAM(s) Nebulizer every 6 hours  apixaban 5 milliGRAM(s) Oral every 12 hours  artificial tears (preservative free) Ophthalmic Solution 1 Drop(s) Both EYES every 4 hours  atorvastatin 10 milliGRAM(s) Oral at bedtime  aztreonam  IVPB 2000 milliGRAM(s) IV Intermittent <User Schedule>  chlorhexidine 0.12% Liquid 15 milliLiter(s) Oral Mucosa every 12 hours  chlorhexidine 2% Cloths 1 Application(s) Topical daily  dextrose 5%. 1000 milliLiter(s) (50 mL/Hr) IV Continuous <Continuous>  dextrose 5%. 1000 milliLiter(s) (100 mL/Hr) IV Continuous <Continuous>  dextrose 50% Injectable 12.5 Gram(s) IV Push once  dextrose 50% Injectable 25 Gram(s) IV Push once  dextrose 50% Injectable 25 Gram(s) IV Push once  dextrose 50% Injectable 25 Gram(s) IV Push once  epoetin odalis (EPOGEN) Injectable 98517 Unit(s) IV Push <User Schedule>  ferrous    sulfate Liquid 300 milliGRAM(s) Oral daily  glucagon  Injectable 1 milliGRAM(s) IntraMuscular once  insulin lispro (ADMELOG) corrective regimen sliding scale   SubCutaneous every 6 hours  insulin NPH human recombinant 6 Unit(s) SubCutaneous every 6 hours  levETIRAcetam  Solution 1000 milliGRAM(s) Oral daily  levETIRAcetam  Solution 250 milliGRAM(s) Oral <User Schedule>  midodrine 10 milliGRAM(s) Oral once  midodrine. 30 milliGRAM(s) Oral every 8 hours  pantoprazole  Injectable 40 milliGRAM(s) IV Push two times a day  petrolatum Ophthalmic Ointment 1 Application(s) Both EYES four times a day  sodium chloride 1 Gram(s) Oral two times a day    VITALS:  T(C): , Max: 38.4 (10-17-23 @ 05:02)  T(F): , Max: 101.1 (10-17-23 @ 05:02)  HR: 110 (10-17-23 @ 06:58)  BP: 109/47 (10-17-23 @ 08:07)  BP(mean): 67 (10-17-23 @ 08:07)  RR: 24 (10-17-23 @ 08:07)  SpO2: 99% (10-17-23 @ 08:07)    PHYSICAL EXAM:  Constitutional: ill appearing, trached to vent  HEENT: trach-vent, (+)NGT  Respiratory: coarse BS b/l  Cardiovascular: tachy reg s1s2  Gastrointestinal: BS+, soft, NT/ND  Extremities: + peripheral edema  Neurological: tone WNL  Skin: No rashes  Vascular Access: ilene (accessed)     LABS:                        7.5    14.17 )-----------( 467      ( 17 Oct 2023 07:02 )             23.5     10-17    131<L>  |  95<L>  |  36<H>  ----------------------------<  231<H>  4.9   |  24  |  1.73<H>    Ca    8.1<L>      17 Oct 2023 07:02  Phos  3.8     10-17  Mg     2.20     10-17    RADIOLOGY & ADDITIONAL STUDIES:    rad< from: Xray Chest 1 View AP/PA (10.16.23 @ 14:24) >  IMPRESSION:  Enteric tube with tip in the stomach.    Bibasilar patchy opacities.    Bilateral layering effusions, with right increased in latest image.    --- End of Report ---      < end of copied text >   NEPHROLOGY     Patient seen and examined, trach to vent, on HD, febrile overnight. She had HD yesterday, short treatment due to poor catheter flow and subsequent hypotension, was only able to remmove 1.1L. Today on HD, noted with episodes of intradialytic hypotension, UF goal reduced, catheter continues to have poor flow, BFR at 250 at present, with episodes of high arterial pressure.     MEDICATIONS  (STANDING):  albuterol    0.083% 2.5 milliGRAM(s) Nebulizer every 6 hours  apixaban 5 milliGRAM(s) Oral every 12 hours  artificial tears (preservative free) Ophthalmic Solution 1 Drop(s) Both EYES every 4 hours  atorvastatin 10 milliGRAM(s) Oral at bedtime  aztreonam  IVPB 2000 milliGRAM(s) IV Intermittent <User Schedule>  chlorhexidine 0.12% Liquid 15 milliLiter(s) Oral Mucosa every 12 hours  chlorhexidine 2% Cloths 1 Application(s) Topical daily  dextrose 5%. 1000 milliLiter(s) (50 mL/Hr) IV Continuous <Continuous>  dextrose 5%. 1000 milliLiter(s) (100 mL/Hr) IV Continuous <Continuous>  dextrose 50% Injectable 12.5 Gram(s) IV Push once  dextrose 50% Injectable 25 Gram(s) IV Push once  dextrose 50% Injectable 25 Gram(s) IV Push once  dextrose 50% Injectable 25 Gram(s) IV Push once  epoetin odalis (EPOGEN) Injectable 68515 Unit(s) IV Push <User Schedule>  ferrous    sulfate Liquid 300 milliGRAM(s) Oral daily  glucagon  Injectable 1 milliGRAM(s) IntraMuscular once  insulin lispro (ADMELOG) corrective regimen sliding scale   SubCutaneous every 6 hours  insulin NPH human recombinant 6 Unit(s) SubCutaneous every 6 hours  levETIRAcetam  Solution 1000 milliGRAM(s) Oral daily  levETIRAcetam  Solution 250 milliGRAM(s) Oral <User Schedule>  midodrine 10 milliGRAM(s) Oral once  midodrine. 30 milliGRAM(s) Oral every 8 hours  pantoprazole  Injectable 40 milliGRAM(s) IV Push two times a day  petrolatum Ophthalmic Ointment 1 Application(s) Both EYES four times a day  sodium chloride 1 Gram(s) Oral two times a day    VITALS:  T(C): , Max: 38.4 (10-17-23 @ 05:02)  T(F): , Max: 101.1 (10-17-23 @ 05:02)  HR: 110 (10-17-23 @ 06:58)  BP: 109/47 (10-17-23 @ 08:07)  BP(mean): 67 (10-17-23 @ 08:07)  RR: 24 (10-17-23 @ 08:07)  SpO2: 99% (10-17-23 @ 08:07)    PHYSICAL EXAM:  Constitutional: ill appearing, trached to vent  HEENT: trach-vent, (+)NGT  Respiratory: coarse BS b/l  Cardiovascular: tachy reg s1s2  Gastrointestinal: BS+, soft, NT/ND  Extremities: + peripheral edema  Neurological: tone WNL  Skin: No rashes  Vascular Access: ilene (accessed)     LABS:                        7.5    14.17 )-----------( 467      ( 17 Oct 2023 07:02 )             23.5     10-17    131<L>  |  95<L>  |  36<H>  ----------------------------<  231<H>  4.9   |  24  |  1.73<H>    Ca    8.1<L>      17 Oct 2023 07:02  Phos  3.8     10-17  Mg     2.20     10-17    RADIOLOGY & ADDITIONAL STUDIES:    rad< from: Xray Chest 1 View AP/PA (10.16.23 @ 14:24) >  IMPRESSION:  Enteric tube with tip in the stomach.    Bibasilar patchy opacities.    Bilateral layering effusions, with right increased in latest image.    --- End of Report ---      < end of copied text >

## 2023-10-17 NOTE — PROGRESS NOTE ADULT - ASSESSMENT
IMPRESSION: 73F w/ HTN, DM2, CVA, breast CA-bilateral mastectomy, recurrent aspiration pneumonia/respiratory failure, and CKD, 8/11/23 p/w acute hypercapnic respiratory failure; c/b RUSS    (1)Renal - RUSS on CKD4 ==>now newly ESRD. s/p short HD yesterday due to poor catheter flow s/p cathflo.     (2)Hyponatremia - improved/stable with HD    (3)CV- tenuous hemodynamics such with each passing week we have more difficulty performing HD/achieving UF, persistent issue.     (6)Pulm- vent-dependent    RECOMMEND:  (1)HD today: 2.5 hrs, 2kg UF as able, pressors and sodium modelling as needed to keep SBP>90; Epogen with HD   (2)Tunneled cath placement if/when clinically optimized for the procedure  (3)Dose new meds for GFR <10/HD.     Nilda Espinoza, HEAVEN  [facility]  [phone]    IMPRESSION: 73F w/ HTN, DM2, CVA, breast CA-bilateral mastectomy, recurrent aspiration pneumonia/respiratory failure, and CKD, 8/11/23 p/w acute hypercapnic respiratory failure; c/b RUSS    (1)Renal - RUSS on CKD4 ==>now newly ESRD. s/p short HD yesterday due to poor catheter flow s/p cathflo.     (2)Hyponatremia - improved/stable with HD    (3)CV- tenuous hemodynamics such with each passing week we have more difficulty performing HD/achieving UF, persistent issue.     (6)Pulm- vent-dependent    RECOMMEND:  (1)HD today: 2.5 hrs, 2kg UF as able, pressors and sodium modelling as needed to keep SBP>90; Epogen with HD   (2)Follow up blood cultures, if positive would remove shiley and plan for HD holiday for few days with plans to place new catheter likely Friday  (3)Tunneled cath placement if/when clinically optimized for the procedure  (4)Dose new meds for GFR <10/HD.     Nilda Espinoza DNP  [facility]  [phone]    IMPRESSION: 73F w/ HTN, DM2, CVA, breast CA-bilateral mastectomy, recurrent aspiration pneumonia/respiratory failure, and CKD, 8/11/23 p/w acute hypercapnic respiratory failure; c/b RUSS    (1)Renal - RUSS on CKD4 ==>now newly ESRD. s/p short HD yesterday due to poor catheter flow s/p cathflo.     (2)Hyponatremia - improved/stable with HD    (3)CV- tenuous hemodynamics such with each passing week we have more difficulty performing HD/achieving UF, persistent issue.     (6)Pulm- vent-dependent    RECOMMEND:  (1)HD today: 2.5 hrs, 2kg UF as able, pressors and sodium modelling as needed to keep SBP>90; Epogen with HD   (2)Follow up blood cultures, if positive would remove shiley and plan for HD holiday for few days with plans to place new catheter likely Friday  (3)Tunneled cath placement if/when clinically optimized for the procedure  (4)Dose new meds for GFR <10/HD.     D/w Dr. Isaiah Espinoza, [facility]  [phone]    IMPRESSION: 73F w/ HTN, DM2, CVA, breast CA-bilateral mastectomy, recurrent aspiration pneumonia/respiratory failure, and CKD, 8/11/23 p/w acute hypercapnic respiratory failure; c/b RUSS    (1)Renal - RUSS on CKD4 ==>now newly ESRD. s/p short HD yesterday due to poor catheter flow s/p cathflo.     (2)Hyponatremia - improved/stable with HD    (3)CV- tenuous hemodynamics such with each passing week we have more difficulty performing HD/achieving UF, persistent issue.     (6)Pulm- vent-dependent    RECOMMEND:  (1)HD today: 2.5 hrs, 2kg UF as able, pressors and sodium modelling as needed to keep SBP>90; Epogen with HD   (2)Follow up blood cultures, if positive would remove lililey, plan to monitor off HD for few days and plan to place new catheter likely Friday  (3)Tunneled cath placement if/when clinically optimized for the procedure  (4)Dose new meds for GFR <10/HD.     D/w Dr. Isaiah Espinoza, [facility]  [phone]    IMPRESSION: 73F w/ HTN, DM2, CVA, breast CA-bilateral mastectomy, recurrent aspiration pneumonia/respiratory failure, and CKD, 8/11/23 p/w acute hypercapnic respiratory failure; c/b RUSS    (1)Renal - RUSS on CKD4 ==>now newly ESRD. s/p short HD yesterday due to poor catheter flow s/p cathflo.     (2)Hyponatremia - improved/stable with HD    (3)CV- tenuous hemodynamics such with each passing week we have more difficulty performing HD/achieving UF, persistent issue.     (6)Pulm- vent-dependent    RECOMMEND:  (1)HD today: 2.5 hrs, 2kg UF as able, pressors and sodium modelling as needed to keep SBP>90; Epogen with HD   (2)Follow up blood cultures, if positive would remove shiley, plan to monitor off HD for few days and plan to place new catheter likely Friday  (3)Tunneled cath placement if/when clinically optimized for the procedure  (4)Dose new meds for GFR <10/HD.     D/w Dr. Isaiah Espinoza DNP  [facility]  [phone]       RENAL ATTENDING NOTE  Patient seen and examined with NP. Agree with assessment and plan as above.    Jimmy Colon MD  [facility]  [phone]   IMPRESSION: 73F w/ HTN, DM2, CVA, breast CA-bilateral mastectomy, recurrent aspiration pneumonia/respiratory failure, and CKD, 8/11/23 p/w acute hypercapnic respiratory failure; c/b RUSS    (1)Renal - RUSS on CKD4 ==>now newly ESRD. s/p short HD yesterday due to poor catheter flow s/p cathflo.     (2)Hyponatremia - improved/stable with HD    (3)CV- tenuous hemodynamics such with each passing week we have more difficulty performing HD/achieving UF, persistent issue.     (6)Pulm- vent-dependent    RECOMMEND:  (1)HD today: 2.5 hrs, 2kg UF as able, pressors and sodium modelling as needed to keep SBP>90; Epogen with HD   (2)Follow up blood cultures, if positive would remove shiley, plan to monitor off HD for few days and plan to place new catheter likely Friday  (3)Tunneled cath placement if/when clinically optimized for the procedure  (4)Dose new meds for GFR <10/HD.     D/w Dr. Isaiah Espinoza, HEAVEN  [facility]  [phone]       RENAL ATTENDING NOTE  Patient seen and examined with NP. Given her erythematous rash of face and trunk, and given that the BCx from 10/14/23 NGTD, it appears most likely that the fever of late is from allergic reaction rather than infection. Appears we can maintain the shiley for now. That being said, if the catheter continues to function poorly, at some point we will have to plan to exchange it. Also, I see no need for more than 3 treatments per week at present. Ultrafiltration has not been particularly successful of late due to intradialytic hypotension; I doubt that performance of more than 3 treatments per week would have more than a nominal additional impact on fluid status.    Jimmy Colon MD  [facility]  [phone]

## 2023-10-17 NOTE — PROGRESS NOTE ADULT - SUBJECTIVE AND OBJECTIVE BOX
Subjective: Patient seen and examined. No new events except as noted.     REVIEW OF SYSTEMS:    Unable to obtain      MEDICATIONS:  MEDICATIONS  (STANDING):  albuterol    0.083% 2.5 milliGRAM(s) Nebulizer every 6 hours  apixaban 5 milliGRAM(s) Oral every 12 hours  artificial tears (preservative free) Ophthalmic Solution 1 Drop(s) Both EYES every 4 hours  atorvastatin 10 milliGRAM(s) Oral at bedtime  aztreonam  IVPB 2000 milliGRAM(s) IV Intermittent <User Schedule>  chlorhexidine 0.12% Liquid 15 milliLiter(s) Oral Mucosa every 12 hours  chlorhexidine 2% Cloths 1 Application(s) Topical daily  dextrose 5%. 1000 milliLiter(s) (50 mL/Hr) IV Continuous <Continuous>  dextrose 5%. 1000 milliLiter(s) (100 mL/Hr) IV Continuous <Continuous>  dextrose 50% Injectable 12.5 Gram(s) IV Push once  dextrose 50% Injectable 25 Gram(s) IV Push once  dextrose 50% Injectable 25 Gram(s) IV Push once  dextrose 50% Injectable 25 Gram(s) IV Push once  epoetin odalis (EPOGEN) Injectable 90094 Unit(s) IV Push <User Schedule>  ferrous    sulfate Liquid 300 milliGRAM(s) Oral daily  glucagon  Injectable 1 milliGRAM(s) IntraMuscular once  insulin lispro (ADMELOG) corrective regimen sliding scale   SubCutaneous every 6 hours  insulin NPH human recombinant 6 Unit(s) SubCutaneous every 6 hours  levETIRAcetam  Solution 1000 milliGRAM(s) Oral daily  levETIRAcetam  Solution 250 milliGRAM(s) Oral <User Schedule>  midodrine 10 milliGRAM(s) Oral once  midodrine. 30 milliGRAM(s) Oral every 8 hours  pantoprazole  Injectable 40 milliGRAM(s) IV Push two times a day  petrolatum Ophthalmic Ointment 1 Application(s) Both EYES four times a day  sodium chloride 1 Gram(s) Oral two times a day      PHYSICAL EXAM:  T(C): 37.3 (10-17-23 @ 08:07), Max: 38.4 (10-17-23 @ 05:02)  HR: 110 (10-17-23 @ 06:58) (100 - 124)  BP: 109/47 (10-17-23 @ 08:07) (93/46 - 132/53)  RR: 24 (10-17-23 @ 08:07) (24 - 24)  SpO2: 99% (10-17-23 @ 08:07) (94% - 100%)  Wt(kg): --  I&O's Summary    16 Oct 2023 07:01  -  17 Oct 2023 07:00  --------------------------------------------------------  IN: 730 mL / OUT: 700 mL / NET: 30 mL              Appearance: NAD, +trach  HEENT: dry oral mucosa  Lymphatic: No lymphadenopathy  Cardiovascular: Normal S1 S2, No JVD, No murmurs, No edema  Respiratory: Decreased BS, + trach to vent	  Neuro: opens eyes  Gastrointestinal: Soft, Non-tender, + BS, + NGT  Skin: No rashes, No ecchymoses, No cyanosis	  Extremities: No strength/ROM 2/2 sedation, + BL LE edema  Vascular: Peripheral pulses palpable 2+ bilaterally  Mode: AC/ CMV (Assist Control/ Continuous Mandatory Ventilation), RR (machine): 20, TV (machine): 340, FiO2: 35, PEEP: 8, ITime: 0.8, MAP: 19, PC: 38, PIP: 46    LABS:    CARDIAC MARKERS:                                7.5    14.17 )-----------( 467      ( 17 Oct 2023 07:02 )             23.5     10-17    131<L>  |  95<L>  |  36<H>  ----------------------------<  231<H>  4.9   |  24  |  1.73<H>    Ca    8.1<L>      17 Oct 2023 07:02  Phos  3.8     10-17  Mg     2.20     10-17      proBNP:   Lipid Profile:   HgA1c:   TSH:             TELEMETRY: 	    ECG:  	  RADIOLOGY:   DIAGNOSTIC TESTING:  [ ] Echocardiogram:  [ ]  Catheterization:  [ ] Stress Test:    OTHER:

## 2023-10-17 NOTE — PROGRESS NOTE ADULT - ASSESSMENT
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 While in MICU patient noted with worsening renal failure requiring HD initiation, CGE/ Melena s/p EGD 8/31 found with esophagitis and erosive gastropathy, new right cerebellar infarct and fevers second to ESBL COLI and MSSA VAP, MSSA bacteremia, left ear otitis externa and drug rxn to cephalosporins. 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 Case discussed at length renal and given good UOP attempted renal recovery, however failed, and upon reinitiation of HD attempt. R IJ SHILEY failed. Attempted volume removal with Bumex GTT however course complicated by hypotension requiring transfer back to MICU 9/26 for pressor support. Diuresis dc'ed, pressors weaned off and stress steroids started. L IJ SHILEY placed (9/30) and HD reinstated. Course further complicated by AOCD and  PSA and Proteus VAP. Patient transferred back to RCU 10/1 with course complicated by volume overload and grossly resistant organisms.    NEUROLOGY  # NEW cerebella infarct   - Baseline MS AOX3 with short term memory changes per family however noted with concern for poor mentation in ICU  - MRI (8/17) with NEW right cerebellar infarct and old left PCA/occipital infarcts  - STEPHNAIE (8/17) with AV showing two linear mobile echogenic elements attached to valve leaflets consistent with Lambel's excrescence, but no TRINITY thrombus, and lambel's excrescence with uncertain clinical implication.   - EEG (8/11-8/15) with no seizures   - ILR interrogation (9/7) with SR and PACs falsely recorded as AF.   - Continue on ASA and lipitor for medical management   - DC ASA per neuro - on Eliquis again     # Seizures   - Course complicated by questionable head and body shaking with prolactin elevated 9/8. Discussed for spot EEG however held given low likelihood of seizures noted and acute respiratory illnesses   - Course further complicated with right arm twitching and RPT EEG (10/4) with no seizure however but noted with increase seizure potential in left posterior quadrants.   - RPT EEG (10/5) with possible focal seizures and risk of focal-onset seizures from multiple locations, most prominent in the left posterior temporal/posterocentral region  - RPT EEG (10/6) with RUE twitching are likely non-epileptic in nature, however risk of focal-onset seizures from multiple locations  - Continue on Keppra PPX     # Metabolic vs Uremic Encephalopathy   - Lethargy noted with progression to stupor thought to be second to uremia.   - RPT CTH (9/26) with vague areas of hypoattenuation within the bilateral cerebellar hemispheres which may be compatible with acute/subacute ischemia.   - Discussed CTH with neurology and no signs of new infarct noted.   - HD reinstated in ICU with improvement in uremia however mentation remained   - Poor mentation likely in setting of toxic encephalopathy in setting of infections.     CARDIOVASCULAR  # HTN Urgency   # Septic vs vasoplegic shock   - Labile BPs ranging in between SBP 80s-200s and attempted labetalol, however noted with hypotension and bradycardia likely from BB toxicity vs shock state?    - Holding Labetalol, Catapres and Catapres.    - Attempted volume removal with bumex diuresis however noted with return of shock state requiring pressor support and transfer back to ICU.   - Pressors weaned off to stress dose (last day 10/4) and Midodrine   - Continue on Midodrine 30 TID (9/29 - ) and ensure timed 30-60 minutes prior to HD  - DO NOT HOLD MIDODRINE   - BP improved s/p albumin     # Hx of HFpEF with likely ADHF with volume overload.   - ECHO (7/2023) with EF 59 with severe LVH and diastolic dysfunction   - STEPHANIE (8/18) with normal LVRVSF however noted with increased LA pressures and persistent LA septal bowing into RA.   - ECHO (8/11) with EF 60 with mild LVH, normal LVRVSF, and mild ddfxn.  - Grossly volume overloaded and removing volume with HD midodrine assisted sessions     HEENT   # Left ear OE with acute on chronic left-sided otomastoiditis  - ENT evaluation (9/7) with no mastoid tenderness, however found with positive swelling and excoriation to left auricle and tenderness to manipulation of auricle. Debrided crusting of auricle and EAC evaluated with edema and unable to visualize TM. EAC debrided and found with watery exudate.   - CT IAC with acute on chronic left-sided otitis externa and acute on chronic left-sided otomastoiditis. Superimposed left-sided tympanosclerosis. Superimposed cholesteatoma formation within the left tympanomastoid cavity cannot be excluded  - Completed CiproHC (9/5 - 9/16), Bradford (9/1-10/1) and acetic acid and bacitracin.   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   - Continue on empiric valtrex 500mg BID (9/29 - 10/8) per ophtho and ID  - Continue preservative free artificial tears 4 times a day in the both eye  - Continue lacrilube ointment twice a day in the both eyes   - Ophthalmology reconsulted (10/12) given L-eye injection     # Oral Lesions with questionable Zoster vs Trauma?   - Patient with tongue pain and seen with anterior ulcerations consolidating and crusting and posterior ulceration.  - Case discussed with pathology resident and culture/ cytology sent.   - HSV negative and Path (9/6) with normal appearing epithelial cells singly and in clusters devoid of viral cytopathic effect.   - Continue on magic mouth wash for pain    RESPIRATORY  # AHHRF second to volume overload   - Hx of CKD and HFpEF presented with SOB and hypercarbia second to volume overload requiring intubation and HD initiation   - Vent weaning attempted however limited requiring tracheostomy (9/1) with IP   - Course complicated by PIPs elevated and found with tracheomalacia and volume overloaded.   - CT CHEST (9/8) with tracheomalacia, BL pleural effusions and continued consolidations   - Continue on vent and given TBM increased PEEP to 8 with improvement in dynamic collapse, but PIP waxes and wanes with volume overloaded and secretions.  - Continue on albuterol and chest PT  - Continue volume removal with HD   - Attempted HTS but noted with hemoptysis and held   - Hold Atrovent given thick secretions   - Hold IPV given high inspiratory pressures   - Vent changed to PS with improvement - VBG good   - Had inc wob /hypoxic poor volumes today placed back on volume control but pressures increasing pt required positioning to help with volumes - back on pressure control with better volumes /dec pressure   - Rpeat ABG done -ok      GI  # UGIB   - CGE x 1 episode on 8/24 and melena x 2 episodes on 8/31 likely residual blood.   - EGD (8/31) found with esophagitis and erosive gastropathy  - Continue on PPI BID through late October and then PPI QD     # Dysphagia   - NGT-TF continued   - Pending PEG however needs improvement prior to placement.     RENAL  # Progressive CKD   - Presented volume overload and progressive RUSS on CKD   - POCUS with no signs of hydronephrosis in ICU  - Pressor support started with improvement in renal function in ICU  - Attempted steroid course in ICU without improvement in renal function   - Case discussed at length with renal and initiated on HD in ICU   - Remain on HD while in RCU however noted to be volume overloaded. Attempted Bumex pushes and patient noted with good UOP.   - Attempted renal recovery in RCU however noted with decent UOP, but BUN/ CRE continued to rise without improvement on diuresis.   - Ultimately resumed on HD MWF TIW with midodrine assisted volume removal, however pressure remains soft with difficulty removing aggressive fluids.   - D/w Renal for additional UF session (10/12)    # Hyponatremia   - Continue on salt tabs 1G  (decreased 10/6) and weaning off as tolerated with volume removal   - Monitor sodium     # Hyperphosphatemia to Hypophosphatemia with HD  - PTH normal and elevated phos likely from renal failure  - Phos binder with Renvela started with improvement however then noted with hypophosphatemia and Renvela stopped.   - required replacment KPHOS today with good response - continue to monitor     INFECTIOUS DISEASE  # ESBL ECOLI and MSSA VAP c/b MSSA bacteremia   - RVP/ COVID (8/11) negative   - SCx (8/11) with MSSA s/p cefepime (8/12-8/13) and then ancef (8/14) however noted with drug reaction and then changed to vanco and aztreonam (8/12-8/13) in ICU .   - Course complicated by recurrent fevers and return of MSSA with bacteremia.   - SCx (9/1) with MSSA and ESBL ECOLI   - BAL (9/1) with MSSA   - BCx (9/1) with MSSA and cleared on (9/4)   - ECHO (9/6) unable to rule out endocarditis   - Continue on Vanco (9/4-9/22) however noted with rashes of uncertain etiology and replaced with Bradford (9/4 - 10/2) and DAPTO (9/26-10/2) for completion.     # Proteus and  PSA VAP/ Trachitis   - Continued fevers and SCx (9/29) with MDR  PSA and Proteus   - Continued on Bradford as above however noted with resistance and changed to Zosyn (10/2 - 10/5) with course complicated by possible drug rxn. Zosyn changed to Aztreonam (10/5 - 10/6) however RPT SCx (10/3) with  PSA however only intermediate coverage to aztreonam and amikacin.  PSA grossly resistant to other antibiotics other than cephalosporins however patient with reactions in the past. Case discussed with ID and ID pharmacist and change to Polymyxin (10/6 - ) however continues to spike fevers likely second to Polymyxin only intermediate coverage and Recarbio resistant .   - Overall difficulty with ABX tailoring given allergic rxns and resistant organisms.   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  - Developed chest rash s/p PRBC transfusion (10/14-10/15) and after workup/discussion with Blood Bank it was deemed this was likely a mild allergic transfusion reaction   - PREMEDICATE WITH H1 OR H2 RECEPTOR BLOCKER PRIOR TO FUTURE TRANSFUSIONS         VASCULAR   # LLE POP DVT   - US BL LE (9/10) with acute left deep vein thrombosis of the left popliteal vein.  - RUE swollen and VA DOPPLER RUE (10/3) with R IJ DVT and R brachial DVT.  - Eliquis held for permacath however procedure held.   - Eliquis remains on hold second to mild suction trauma hemoptysis.   - Resumed Eliquis     # HD access  - L IJ SHILEY (9/27 - )   - Planned for IR permacath cancelled for today and will reattempt when infectious status improves.     ONC   # Hx of Breast CA   - Patient dx in 2018 and s/p BL mastectomy (radical on right) and chemo and RT.   - Supportive care.     ENDOCRINE  # IDDM2   - Continued on NPH with ISS, however noted with hypoglycemic episodes and NPH dc'ed.    - Finger sticks trending up off NPH.   - Continue on NPH 6U with moderate ISS.   - Adjust as need     SKIN  # Drug Eruption   - Attempted cefepime (8/12) vs ancef (8/13-8/14) and then noted with drug rxn in ICU. Continue on steroids with prednisone 40 (8/18 - 9/2) then prednisone 30 (9/3-9/7), then prednisone 20 (9/8 - 9/10), then prednisone 10mg (9/11-9/13) then turn off.   - Attempted Zosyn (10/2-10/5) with possible rash. Zosyn turned off with improvement.   - Hold further cephalosporins or PCNs at this time   - Monitor for further drug rxns.     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 While in MICU patient noted with worsening renal failure requiring HD initiation, CGE/ Melena s/p EGD 8/31 found with esophagitis and erosive gastropathy, new right cerebellar infarct and fevers second to ESBL COLI and MSSA VAP, MSSA bacteremia, left ear otitis externa and drug rxn to cephalosporins. 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 Case discussed at length renal and given good UOP attempted renal recovery, however failed, and upon reinitiation of HD attempt. R IJ SHILEY failed. Attempted volume removal with Bumex GTT however course complicated by hypotension requiring transfer back to MICU 9/26 for pressor support. Diuresis dc'ed, pressors weaned off and stress steroids started. L IJ SHILEY placed (9/30) and HD reinstated. Course further complicated by AOCD and  PSA and Proteus VAP. Patient transferred back to RCU 10/1 with course complicated by volume overload and grossly resistant organisms.    NEUROLOGY  # NEW cerebella infarct   - Baseline MS AOX3 with short term memory changes per family however noted with concern for poor mentation in ICU  - MRI (8/17) with NEW right cerebellar infarct and old left PCA/occipital infarcts  - STEPHANIE (8/17) with AV showing two linear mobile echogenic elements attached to valve leaflets consistent with Lambel's excrescence, but no TRINITY thrombus, and lambel's excrescence with uncertain clinical implication.   - EEG (8/11-8/15) with no seizures   - ILR interrogation (9/7) with SR and PACs falsely recorded as AF.   - Continue on ASA and lipitor for medical management   - DC ASA per neuro - on Eliquis again   - Pending repeat MRI Brain to r/o new CVA     # Seizures   - Course complicated by questionable head and body shaking with prolactin elevated 9/8. Discussed for spot EEG however held given low likelihood of seizures noted and acute respiratory illnesses   - Course further complicated with right arm twitching and RPT EEG (10/4) with no seizure however but noted with increase seizure potential in left posterior quadrants.   - RPT EEG (10/5) with possible focal seizures and risk of focal-onset seizures from multiple locations, most prominent in the left posterior temporal/posterocentral region  - RPT EEG (10/6) with RUE twitching are likely non-epileptic in nature, however risk of focal-onset seizures from multiple locations  - Continue on Keppra PPX     # Metabolic vs Uremic Encephalopathy   - Lethargy noted with progression to stupor thought to be second to uremia.   - RPT CTH (9/26) with vague areas of hypoattenuation within the bilateral cerebellar hemispheres which may be compatible with acute/subacute ischemia.   - Discussed CTH with neurology and no signs of new infarct noted.   - HD reinstated in ICU with improvement in uremia however mentation remained   - Poor mentation likely in setting of toxic encephalopathy in setting of infections.     CARDIOVASCULAR  # HTN Urgency   # Septic vs vasoplegic shock   - Labile BPs ranging in between SBP 80s-200s and attempted labetalol, however noted with hypotension and bradycardia likely from BB toxicity vs shock state?    - Holding Labetalol, Catapres and Catapres.    - Attempted volume removal with bumex diuresis however noted with return of shock state requiring pressor support and transfer back to ICU.   - Pressors weaned off to stress dose (last day 10/4) and Midodrine   - Continue on Midodrine 30 TID (9/29 - ) and ensure timed 30-60 minutes prior to HD  - DO NOT HOLD MIDODRINE   - BP improved s/p albumin     # Hx of HFpEF with likely ADHF with volume overload.   - ECHO (7/2023) with EF 59 with severe LVH and diastolic dysfunction   - STEPHANIE (8/18) with normal LVRVSF however noted with increased LA pressures and persistent LA septal bowing into RA.   - ECHO (8/11) with EF 60 with mild LVH, normal LVRVSF, and mild ddfxn.  - Grossly volume overloaded and removing volume with HD midodrine assisted sessions     HEENT   # Left ear OE with acute on chronic left-sided otomastoiditis  - ENT evaluation (9/7) with no mastoid tenderness, however found with positive swelling and excoriation to left auricle and tenderness to manipulation of auricle. Debrided crusting of auricle and EAC evaluated with edema and unable to visualize TM. EAC debrided and found with watery exudate.   - CT IAC with acute on chronic left-sided otitis externa and acute on chronic left-sided otomastoiditis. Superimposed left-sided tympanosclerosis. Superimposed cholesteatoma formation within the left tympanomastoid cavity cannot be excluded  - Completed CiproHC (9/5 - 9/16), Bradford (9/1-10/1) and acetic acid and bacitracin.   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   - Continue on empiric valtrex 500mg BID (9/29 - 10/8) per ophtho and ID  - Continue preservative free artificial tears 4 times a day in the both eye  - Continue lacrilube ointment twice a day in the both eyes   - Ophthalmology reconsulted (10/12) given L-eye injection     # Oral Lesions with questionable Zoster vs Trauma?   - Patient with tongue pain and seen with anterior ulcerations consolidating and crusting and posterior ulceration.  - Case discussed with pathology resident and culture/ cytology sent.   - HSV negative and Path (9/6) with normal appearing epithelial cells singly and in clusters devoid of viral cytopathic effect.   - Continue on magic mouth wash for pain    RESPIRATORY  # AHHRF second to volume overload   - Hx of CKD and HFpEF presented with SOB and hypercarbia second to volume overload requiring intubation and HD initiation   - Vent weaning attempted however limited requiring tracheostomy (9/1) with IP   - Course complicated by PIPs elevated and found with tracheomalacia and volume overloaded.   - CT CHEST (9/8) with tracheomalacia, BL pleural effusions and continued consolidations   - Continue on vent and given TBM increased PEEP to 8 with improvement in dynamic collapse, but PIP waxes and wanes with volume overloaded and secretions.  - Continue on albuterol and chest PT  - Continue volume removal with HD   - Attempted HTS but noted with hemoptysis and held   - Hold Atrovent given thick secretions   - Hold IPV given high inspiratory pressures   - Vent changed to PS with improvement - VBG good   - Had inc wob /hypoxic poor volumes today placed back on volume control but pressures increasing pt required positioning to help with volumes - back on pressure control with better volumes /dec pressure   - Rpeat ABG done -ok      GI  # UGIB   - CGE x 1 episode on 8/24 and melena x 2 episodes on 8/31 likely residual blood.   - EGD (8/31) found with esophagitis and erosive gastropathy  - Continue on PPI BID through late October and then PPI QD     # Dysphagia   - NGT-TF continued   - Pending PEG however needs improvement prior to placement.     RENAL  # Progressive CKD   - Presented volume overload and progressive RUSS on CKD   - POCUS with no signs of hydronephrosis in ICU  - Pressor support started with improvement in renal function in ICU  - Attempted steroid course in ICU without improvement in renal function   - Case discussed at length with renal and initiated on HD in ICU   - Remain on HD while in RCU however noted to be volume overloaded. Attempted Bumex pushes and patient noted with good UOP.   - Attempted renal recovery in RCU however noted with decent UOP, but BUN/ CRE continued to rise without improvement on diuresis.   - Ultimately resumed on HD MWF TIW with midodrine assisted volume removal, however pressure remains soft with difficulty removing aggressive fluids.   - D/w Renal for additional UF session (10/12)    # Hyponatremia   - Continue on salt tabs 1G  (decreased 10/6) and weaning off as tolerated with volume removal   - Monitor sodium     # Hyperphosphatemia to Hypophosphatemia with HD  - PTH normal and elevated phos likely from renal failure  - Phos binder with Renvela started with improvement however then noted with hypophosphatemia and Renvela stopped.   - required replacment KPHOS today with good response - continue to monitor     INFECTIOUS DISEASE  # ESBL ECOLI and MSSA VAP c/b MSSA bacteremia   - RVP/ COVID (8/11) negative   - SCx (8/11) with MSSA s/p cefepime (8/12-8/13) and then ancef (8/14) however noted with drug reaction and then changed to vanco and aztreonam (8/12-8/13) in ICU .   - Course complicated by recurrent fevers and return of MSSA with bacteremia.   - SCx (9/1) with MSSA and ESBL ECOLI   - BAL (9/1) with MSSA   - BCx (9/1) with MSSA and cleared on (9/4)   - ECHO (9/6) unable to rule out endocarditis   - Continue on Vanco (9/4-9/22) however noted with rashes of uncertain etiology and replaced with Bradford (9/4 - 10/2) and DAPTO (9/26-10/2) for completion.     # Proteus and  PSA VAP/ Trachitis   - Continued fevers and SCx (9/29) with MDR  PSA and Proteus   - Continued on Bradford as above however noted with resistance and changed to Zosyn (10/2 - 10/5) with course complicated by possible drug rxn. Zosyn changed to Aztreonam (10/5 - 10/6) however RPT SCx (10/3) with  PSA however only intermediate coverage to aztreonam and amikacin.  PSA grossly resistant to other antibiotics other than cephalosporins however patient with reactions in the past. Case discussed with ID and ID pharmacist and change to Polymyxin (10/6 - ) however continues to spike fevers likely second to Polymyxin only intermediate coverage and Recarbio resistant .   - Overall difficulty with ABX tailoring given allergic rxns and resistant organisms.   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  - Developed chest rash s/p PRBC transfusion (10/14-10/15) and after workup/discussion with Blood Bank it was deemed this was likely a mild allergic transfusion reaction   - PREMEDICATE WITH H1 OR H2 RECEPTOR BLOCKER PRIOR TO FUTURE TRANSFUSIONS         VASCULAR   # LLE POP DVT   - US BL LE (9/10) with acute left deep vein thrombosis of the left popliteal vein.  - RUE swollen and VA DOPPLER RUE (10/3) with R IJ DVT and R brachial DVT.  - Eliquis held for permacath however procedure held.   - Eliquis remains on hold second to mild suction trauma hemoptysis.   - Resumed Eliquis     # HD access  - L IJ SHILEY (9/27 - )   - Planned for IR permacath cancelled for today and will reattempt when infectious status improves.     ONC   # Hx of Breast CA   - Patient dx in 2018 and s/p BL mastectomy (radical on right) and chemo and RT.   - Supportive care.     ENDOCRINE  # IDDM2   - Continued on NPH with ISS, however noted with hypoglycemic episodes and NPH dc'ed.    - Finger sticks trending up off NPH.   - Continue on NPH 6U with moderate ISS.   - Adjust as need     SKIN  # Drug Eruption   - Attempted cefepime (8/12) vs ancef (8/13-8/14) and then noted with drug rxn in ICU. Continue on steroids with prednisone 40 (8/18 - 9/2) then prednisone 30 (9/3-9/7), then prednisone 20 (9/8 - 9/10), then prednisone 10mg (9/11-9/13) then turn off.   - Attempted Zosyn (10/2-10/5) with possible rash. Zosyn turned off with improvement.   - Hold further cephalosporins or PCNs at this time   - Monitor for further drug rxns.     ETHICS/ GOC    - Attempted palliative discussion however family not interested and wishes for FULL CODE    DISPO - TBD   ************************

## 2023-10-17 NOTE — PROGRESS NOTE ADULT - SUBJECTIVE AND OBJECTIVE BOX
CHIEF COMPLAINT:Patient is a 73y old  Female who presents with a chief complaint of Respiratory distress (16 Oct 2023 20:34)      INTERVAL EVENTS:     ROS: Seen by bedside during AM rounds     OBJECTIVE:  ICU Vital Signs Last 24 Hrs  T(C): 36.9 (17 Oct 2023 07:25), Max: 38.4 (17 Oct 2023 05:02)  T(F): 98.4 (17 Oct 2023 07:25), Max: 101.1 (17 Oct 2023 05:02)  HR: 110 (17 Oct 2023 06:58) (100 - 124)  BP: 101/55 (17 Oct 2023 05:42) (93/46 - 132/53)  BP(mean): 66 (17 Oct 2023 05:42) (61 - 78)  ABP: --  ABP(mean): --  RR: 24 (17 Oct 2023 05:42) (20 - 24)  SpO2: 96% (17 Oct 2023 06:58) (90% - 100%)    O2 Parameters below as of 17 Oct 2023 05:42  Patient On (Oxygen Delivery Method): ventilator    O2 Concentration (%): 35      Mode: AC/ CMV (Assist Control/ Continuous Mandatory Ventilation), RR (machine): 24, FiO2: 35, PEEP: 8, ITime: 0.8, MAP: 21, PC: 40, PIP: 48    10-16 @ 07:01  -  10-17 @ 07:00  --------------------------------------------------------  IN: 730 mL / OUT: 700 mL / NET: 30 mL      CAPILLARY BLOOD GLUCOSE      POCT Blood Glucose.: 196 mg/dL (17 Oct 2023 04:46)      PHYSICAL EXAM:  General:   HEENT:   Lymph Nodes:  Neck:   Respiratory:   Cardiovascular:   Abdomen:   Extremities:   Skin:   Neurological:  Psychiatry:    Mode: AC/ CMV (Assist Control/ Continuous Mandatory Ventilation)  RR (machine): 24  FiO2: 35  PEEP: 8  ITime: 0.8  MAP: 21  PC: 40  PIP: 48      HOSPITAL MEDICATIONS:  MEDICATIONS  (STANDING):  albuterol    0.083% 2.5 milliGRAM(s) Nebulizer every 6 hours  apixaban 5 milliGRAM(s) Oral every 12 hours  artificial tears (preservative free) Ophthalmic Solution 1 Drop(s) Both EYES every 4 hours  atorvastatin 10 milliGRAM(s) Oral at bedtime  aztreonam  IVPB 2000 milliGRAM(s) IV Intermittent <User Schedule>  chlorhexidine 0.12% Liquid 15 milliLiter(s) Oral Mucosa every 12 hours  chlorhexidine 2% Cloths 1 Application(s) Topical daily  dextrose 5%. 1000 milliLiter(s) (50 mL/Hr) IV Continuous <Continuous>  dextrose 5%. 1000 milliLiter(s) (100 mL/Hr) IV Continuous <Continuous>  dextrose 50% Injectable 12.5 Gram(s) IV Push once  dextrose 50% Injectable 25 Gram(s) IV Push once  dextrose 50% Injectable 25 Gram(s) IV Push once  dextrose 50% Injectable 25 Gram(s) IV Push once  epoetin odalis (EPOGEN) Injectable 19048 Unit(s) IV Push <User Schedule>  ferrous    sulfate Liquid 300 milliGRAM(s) Oral daily  glucagon  Injectable 1 milliGRAM(s) IntraMuscular once  insulin lispro (ADMELOG) corrective regimen sliding scale   SubCutaneous every 6 hours  insulin NPH human recombinant 6 Unit(s) SubCutaneous every 6 hours  levETIRAcetam  Solution 1000 milliGRAM(s) Oral daily  levETIRAcetam  Solution 250 milliGRAM(s) Oral <User Schedule>  midodrine 10 milliGRAM(s) Oral once  midodrine. 30 milliGRAM(s) Oral every 8 hours  pantoprazole  Injectable 40 milliGRAM(s) IV Push two times a day  petrolatum Ophthalmic Ointment 1 Application(s) Both EYES four times a day  sodium chloride 1 Gram(s) Oral two times a day    MEDICATIONS  (PRN):  acetaminophen   Oral Liquid .. 650 milliGRAM(s) Oral every 6 hours PRN Temp greater or equal to 38C (100.4F), Mild Pain (1 - 3)  dextrose Oral Gel 15 Gram(s) Oral once PRN Blood Glucose LESS THAN 70 milliGRAM(s)/deciliter  sodium chloride 0.9% Bolus. 250 milliLiter(s) IV Bolus every 5 minutes PRN SBP LESS THAN or EQUAL to 90 mmHg  sodium chloride 0.9% lock flush 10 milliLiter(s) IV Push every 1 hour PRN Pre/post blood products, medications, blood draw, and to maintain line patency      LABS:                        7.5    14.17 )-----------( 467      ( 17 Oct 2023 07:02 )             23.5     10-17    131<L>  |  95<L>  |  36<H>  ----------------------------<  231<H>  4.9   |  24  |  1.73<H>    Ca    8.1<L>      17 Oct 2023 07:02  Phos  3.8     10-17  Mg     2.20     10-17        Urinalysis Basic - ( 17 Oct 2023 07:02 )    Color: x / Appearance: x / SG: x / pH: x  Gluc: 231 mg/dL / Ketone: x  / Bili: x / Urobili: x   Blood: x / Protein: x / Nitrite: x   Leuk Esterase: x / RBC: x / WBC x   Sq Epi: x / Non Sq Epi: x / Bacteria: x         CHIEF COMPLAINT:Patient is a 73y old  Female who presents with a chief complaint of Respiratory distress (16 Oct 2023 20:34)      INTERVAL EVENTS: febrile again overnight. New Bcx sent. Tolerating HD today with 700ml removed. Aztreonam restarted.     ROS: Unable to obtain ROS given poor mentation.     OBJECTIVE:  ICU Vital Signs Last 24 Hrs  T(C): 36.9 (17 Oct 2023 07:25), Max: 38.4 (17 Oct 2023 05:02)  T(F): 98.4 (17 Oct 2023 07:25), Max: 101.1 (17 Oct 2023 05:02)  HR: 110 (17 Oct 2023 06:58) (100 - 124)  BP: 101/55 (17 Oct 2023 05:42) (93/46 - 132/53)  BP(mean): 66 (17 Oct 2023 05:42) (61 - 78)  ABP: --  ABP(mean): --  RR: 24 (17 Oct 2023 05:42) (20 - 24)  SpO2: 96% (17 Oct 2023 06:58) (90% - 100%)    O2 Parameters below as of 17 Oct 2023 05:42  Patient On (Oxygen Delivery Method): ventilator    O2 Concentration (%): 35      Mode: AC/ CMV (Assist Control/ Continuous Mandatory Ventilation), RR (machine): 24, FiO2: 35, PEEP: 8, ITime: 0.8, MAP: 21, PC: 40, PIP: 48    10-16 @ 07:01  -  10-17 @ 07:00  --------------------------------------------------------  IN: 730 mL / OUT: 700 mL / NET: 30 mL      CAPILLARY BLOOD GLUCOSE      POCT Blood Glucose.: 196 mg/dL (17 Oct 2023 04:46)    PHYSICAL EXAM:  General: NAD   HEENT:(+)NGT in nare  Neck: (+) Trach tube noted, site c/d/i. (+)LIJ Shiley  Cards: S1/S2, no murmurs   Pulm: Coarse wheezing b/l. No resp distress.  Abdomen: Soft, Nontender. Lower abd wall edema.   Extremities: Anasarca.   Neurology: Somnolent. Nonverbal. Not following commands.   Skin: (+) nonraised erythematous dispersed patches across chest. Ext warm to touch, color appropriate for ethnicity. Refer to RN assessment for further details.    Mode: AC/ CMV (Assist Control/ Continuous Mandatory Ventilation)  RR (machine): 24  FiO2: 35  PEEP: 8  ITime: 0.8  MAP: 21  PC: 40  PIP: 48      HOSPITAL MEDICATIONS:  MEDICATIONS  (STANDING):  albuterol    0.083% 2.5 milliGRAM(s) Nebulizer every 6 hours  apixaban 5 milliGRAM(s) Oral every 12 hours  artificial tears (preservative free) Ophthalmic Solution 1 Drop(s) Both EYES every 4 hours  atorvastatin 10 milliGRAM(s) Oral at bedtime  aztreonam  IVPB 2000 milliGRAM(s) IV Intermittent <User Schedule>  chlorhexidine 0.12% Liquid 15 milliLiter(s) Oral Mucosa every 12 hours  chlorhexidine 2% Cloths 1 Application(s) Topical daily  dextrose 5%. 1000 milliLiter(s) (50 mL/Hr) IV Continuous <Continuous>  dextrose 5%. 1000 milliLiter(s) (100 mL/Hr) IV Continuous <Continuous>  dextrose 50% Injectable 12.5 Gram(s) IV Push once  dextrose 50% Injectable 25 Gram(s) IV Push once  dextrose 50% Injectable 25 Gram(s) IV Push once  dextrose 50% Injectable 25 Gram(s) IV Push once  epoetin odalis (EPOGEN) Injectable 70872 Unit(s) IV Push <User Schedule>  ferrous    sulfate Liquid 300 milliGRAM(s) Oral daily  glucagon  Injectable 1 milliGRAM(s) IntraMuscular once  insulin lispro (ADMELOG) corrective regimen sliding scale   SubCutaneous every 6 hours  insulin NPH human recombinant 6 Unit(s) SubCutaneous every 6 hours  levETIRAcetam  Solution 1000 milliGRAM(s) Oral daily  levETIRAcetam  Solution 250 milliGRAM(s) Oral <User Schedule>  midodrine 10 milliGRAM(s) Oral once  midodrine. 30 milliGRAM(s) Oral every 8 hours  pantoprazole  Injectable 40 milliGRAM(s) IV Push two times a day  petrolatum Ophthalmic Ointment 1 Application(s) Both EYES four times a day  sodium chloride 1 Gram(s) Oral two times a day    MEDICATIONS  (PRN):  acetaminophen   Oral Liquid .. 650 milliGRAM(s) Oral every 6 hours PRN Temp greater or equal to 38C (100.4F), Mild Pain (1 - 3)  dextrose Oral Gel 15 Gram(s) Oral once PRN Blood Glucose LESS THAN 70 milliGRAM(s)/deciliter  sodium chloride 0.9% Bolus. 250 milliLiter(s) IV Bolus every 5 minutes PRN SBP LESS THAN or EQUAL to 90 mmHg  sodium chloride 0.9% lock flush 10 milliLiter(s) IV Push every 1 hour PRN Pre/post blood products, medications, blood draw, and to maintain line patency      LABS:                        7.5    14.17 )-----------( 467      ( 17 Oct 2023 07:02 )             23.5     10-17    131<L>  |  95<L>  |  36<H>  ----------------------------<  231<H>  4.9   |  24  |  1.73<H>    Ca    8.1<L>      17 Oct 2023 07:02  Phos  3.8     10-17  Mg     2.20     10-17        Urinalysis Basic - ( 17 Oct 2023 07:02 )    Color: x / Appearance: x / SG: x / pH: x  Gluc: 231 mg/dL / Ketone: x  / Bili: x / Urobili: x   Blood: x / Protein: x / Nitrite: x   Leuk Esterase: x / RBC: x / WBC x   Sq Epi: x / Non Sq Epi: x / Bacteria: x

## 2023-10-17 NOTE — PROGRESS NOTE ADULT - SUBJECTIVE AND OBJECTIVE BOX
Follow Up:  MSSA bacteremia, otitis external, VAP, fever    Interval History: pt again febrile today and sputum cx with pseudomonas and proteus    ROS:  unable to obtain because: trached, AMS        Allergies  isoniazid (Rash)  nafcillin (Unknown)  hydrALAZINE (Rash)  vitamin E (Short breath; Urticaria; Hives)  doxycycline (Rash)  cefepime (Rash)  NIFEdipine (Urticaria; Hives)        ANTIMICROBIALS:  aztreonam  IVPB 2000 <User Schedule>      OTHER MEDS:  acetaminophen   Oral Liquid .. 650 milliGRAM(s) Oral every 6 hours PRN  albuterol    0.083% 2.5 milliGRAM(s) Nebulizer every 6 hours  apixaban 5 milliGRAM(s) Oral every 12 hours  artificial tears (preservative free) Ophthalmic Solution 1 Drop(s) Both EYES every 4 hours  atorvastatin 10 milliGRAM(s) Oral at bedtime  chlorhexidine 0.12% Liquid 15 milliLiter(s) Oral Mucosa every 12 hours  chlorhexidine 2% Cloths 1 Application(s) Topical daily  dextrose 5%. 1000 milliLiter(s) IV Continuous <Continuous>  dextrose 5%. 1000 milliLiter(s) IV Continuous <Continuous>  dextrose 50% Injectable 12.5 Gram(s) IV Push once  dextrose 50% Injectable 25 Gram(s) IV Push once  dextrose 50% Injectable 25 Gram(s) IV Push once  dextrose 50% Injectable 25 Gram(s) IV Push once  dextrose Oral Gel 15 Gram(s) Oral once PRN  epoetin oadlis (EPOGEN) Injectable 45485 Unit(s) IV Push <User Schedule>  ferrous    sulfate Liquid 300 milliGRAM(s) Oral daily  glucagon  Injectable 1 milliGRAM(s) IntraMuscular once  insulin lispro (ADMELOG) corrective regimen sliding scale   SubCutaneous every 6 hours  insulin NPH human recombinant 6 Unit(s) SubCutaneous every 6 hours  levETIRAcetam  Solution 1000 milliGRAM(s) Oral daily  levETIRAcetam  Solution 250 milliGRAM(s) Oral <User Schedule>  midodrine 10 milliGRAM(s) Oral once  midodrine. 30 milliGRAM(s) Oral every 8 hours  pantoprazole  Injectable 40 milliGRAM(s) IV Push two times a day  petrolatum Ophthalmic Ointment 1 Application(s) Both EYES four times a day  sodium chloride 1 Gram(s) Oral two times a day  sodium chloride 0.9% Bolus. 250 milliLiter(s) IV Bolus every 5 minutes PRN  sodium chloride 0.9% lock flush 10 milliLiter(s) IV Push every 1 hour PRN      Vital Signs Last 24 Hrs  T(C): 36.6 (17 Oct 2023 10:07), Max: 38.4 (17 Oct 2023 05:02)  T(F): 97.9 (17 Oct 2023 10:07), Max: 101.1 (17 Oct 2023 05:02)  HR: 96 (17 Oct 2023 11:37) (96 - 117)  BP: 86/55 (17 Oct 2023 11:37) (86/55 - 132/53)  BP(mean): 64 (17 Oct 2023 11:37) (61 - 78)  RR: 24 (17 Oct 2023 10:07) (24 - 24)  SpO2: 97% (17 Oct 2023 10:50) (95% - 100%)    Parameters below as of 17 Oct 2023 10:07  Patient On (Oxygen Delivery Method): ventilator        Physical Exam:  General:    trached   Respiratory:   trach on vent  abd:  distended but soft  :  no  willard  Musculoskeletal : generalized edema  Skin: no rash now  vascular: TRISHA odom                         7.5    14.17 )-----------( 467      ( 17 Oct 2023 07:02 )             23.5       10-17    131<L>  |  95<L>  |  36<H>  ----------------------------<  231<H>  4.9   |  24  |  1.73<H>    Ca    8.1<L>      17 Oct 2023 07:02  Phos  3.8     10-17  Mg     2.20     10-17        Urinalysis Basic - ( 17 Oct 2023 07:02 )    Color: x / Appearance: x / SG: x / pH: x  Gluc: 231 mg/dL / Ketone: x  / Bili: x / Urobili: x   Blood: x / Protein: x / Nitrite: x   Leuk Esterase: x / RBC: x / WBC x   Sq Epi: x / Non Sq Epi: x / Bacteria: x        MICROBIOLOGY:  v  .Blood Blood-Peripheral  10-14-23   No growth at 48 Hours  --  --      Trach Asp Tracheal Aspirate  10-14-23   Numerous Pseudomonas aeruginosa (Carbapenem Resistant)  Numerous Proteus mirabilis  Normal Respiratory Cate present  --  Pseudomonas aeruginosa (Carbapenem Resistant)  Proteus mirabilis      .Blood Blood-Venous  10-09-23   No growth at 5 days  --  --      Trach Asp Tracheal Aspirate  10-03-23   Numerous Pseudomonas aeruginosa (Carbapenem Resistant) Susceptibility to  follow.  Normal Respiratory Cate absent  --  Pseudomonas aeruginosa (Carbapenem Resistant)      .Blood Blood-Venous  09-29-23   No growth at 5 days  --  --      .Blood Blood-Peripheral  09-29-23   No growth at 5 days  --  --      ET Tube ET Tube  09-29-23   Rare Yeast  --  --      ET Tube ET Tube  09-29-23   Numerous Proteus mirabilis  Moderate Pseudomonas aeruginosa (Carbapenem Resistant)  Normal Respiratory Cate absent  --  Proteus mirabilis  Pseudomonas aeruginosa (Carbapenem Resistant)      Clean Catch Clean Catch (Midstream)  09-26-23   10,000 - 49,000 CFU/mL Candida albicans "Susceptibilities not performed"  --  --      .Blood Blood-Peripheral  09-26-23   No growth at 5 days  --  --      .Blood Blood-Venous  09-20-23   No growth at 5 days  --  --      .Blood Blood-Peripheral  09-20-23   No growth at 5 days  --  --          Rapid RVP Result: NotDete (10-14 @ 05:10)        RADIOLOGY:  Images independently visualized and reviewed personally, findings as below  < from: Xray Chest 1 View AP/PA (10.16.23 @ 14:24) >  IMPRESSION:  Enteric tube with tip in the stomach.    Bibasilar patchy opacities.    Bilateral layering effusions, with right increased in latest image.    < end of copied text >

## 2023-10-17 NOTE — PROGRESS NOTE ADULT - REASON FOR ADMISSION
bleeding per rectum
Dressing: bandage

## 2023-10-18 NOTE — PROGRESS NOTE ADULT - SUBJECTIVE AND OBJECTIVE BOX
Subjective: Patient seen and examined. No new events except as noted.     REVIEW OF SYSTEMS:  Unable to obtain      MEDICATIONS:  MEDICATIONS  (STANDING):  albuterol    0.083% 2.5 milliGRAM(s) Nebulizer every 6 hours  apixaban 5 milliGRAM(s) Oral every 12 hours  artificial tears (preservative free) Ophthalmic Solution 1 Drop(s) Both EYES every 4 hours  atorvastatin 10 milliGRAM(s) Oral at bedtime  aztreonam  IVPB 2000 milliGRAM(s) IV Intermittent <User Schedule>  chlorhexidine 0.12% Liquid 15 milliLiter(s) Oral Mucosa every 12 hours  chlorhexidine 2% Cloths 1 Application(s) Topical daily  dextrose 5%. 1000 milliLiter(s) (50 mL/Hr) IV Continuous <Continuous>  dextrose 5%. 1000 milliLiter(s) (100 mL/Hr) IV Continuous <Continuous>  dextrose 50% Injectable 12.5 Gram(s) IV Push once  dextrose 50% Injectable 25 Gram(s) IV Push once  dextrose 50% Injectable 25 Gram(s) IV Push once  dextrose 50% Injectable 25 Gram(s) IV Push once  epoetin odalis (EPOGEN) Injectable 48405 Unit(s) IV Push <User Schedule>  ferrous    sulfate Liquid 300 milliGRAM(s) Oral daily  glucagon  Injectable 1 milliGRAM(s) IntraMuscular once  insulin lispro (ADMELOG) corrective regimen sliding scale   SubCutaneous every 6 hours  insulin NPH human recombinant 6 Unit(s) SubCutaneous every 6 hours  levETIRAcetam  Solution 1000 milliGRAM(s) Oral daily  levETIRAcetam  Solution 250 milliGRAM(s) Oral <User Schedule>  midodrine. 30 milliGRAM(s) Oral every 8 hours  pantoprazole  Injectable 40 milliGRAM(s) IV Push two times a day  petrolatum Ophthalmic Ointment 1 Application(s) Both EYES four times a day  sodium chloride 1 Gram(s) Oral two times a day      PHYSICAL EXAM:  T(C): 37.2 (10-18-23 @ 05:18), Max: 37.8 (10-17-23 @ 16:00)  HR: 113 (10-18-23 @ 10:07) (96 - 113)  BP: 111/37 (10-18-23 @ 05:18) (86/55 - 120/59)  RR: 20 (10-18-23 @ 05:18) (20 - 22)  SpO2: 100% (10-18-23 @ 10:07) (97% - 100%)  Wt(kg): --  I&O's Summary    17 Oct 2023 07:01  -  18 Oct 2023 07:00  --------------------------------------------------------  IN: 1380 mL / OUT: 1500 mL / NET: -120 mL              Appearance: NAD, +trach  HEENT: dry oral mucosa  Lymphatic: No lymphadenopathy  Cardiovascular: Normal S1 S2, No JVD, No murmurs, No edema  Respiratory: Decreased BS, + trach to vent	  Neuro: opens eyes  Gastrointestinal: Soft, Non-tender, + BS, + NGT  Skin: No rashes, No ecchymoses, No cyanosis	  Extremities: No strength/ROM 2/2 sedation, + BL LE edema  Vascular: Peripheral pulses palpable 2+ bilaterally    Mode: AC/ CMV (Assist Control/ Continuous Mandatory Ventilation), RR (machine): 24, FiO2: 35, PEEP: 8, ITime: 0.8, MAP: 19, PC: 35, PIP: 44      LABS:    CARDIAC MARKERS:                                7.5    14.17 )-----------( 467      ( 17 Oct 2023 07:02 )             23.5     10-17    131<L>  |  95<L>  |  36<H>  ----------------------------<  231<H>  4.9   |  24  |  1.73<H>    Ca    8.1<L>      17 Oct 2023 07:02  Phos  3.8     10-17  Mg     2.20     10-17      proBNP:   Lipid Profile:   HgA1c:   TSH:             TELEMETRY: 	    ECG:  	  RADIOLOGY:   DIAGNOSTIC TESTING:  [ ] Echocardiogram:  [ ]  Catheterization:  [ ] Stress Test:    OTHER:

## 2023-10-18 NOTE — PROGRESS NOTE ADULT - NS ATTEND AMEND GEN_ALL_CORE FT
agree with above  case d/w Nephrology attending this mornin  will add droxidopa to try to maintain BP to allow for more flluid removal  afebrile today  on aztreonam. blood cx neg to date

## 2023-10-18 NOTE — PROGRESS NOTE ADULT - ASSESSMENT
73 f with DM, HTN, CKD, breast CA, latent TB (treated first with INH then had rash and switched to rifampin), MSSA bacteremia, c-diff, respiratory arrest and cardiac arrest (2018), s/p trach/PEG then removed, CVA with residual weakness, aspiration PNA, 1/4 sputums had MAC 7/2023, admitted 8/11 with respiratory failure no fever or WBC but was intubated and then spiked  blood cx negative, sputum cx with MSSA  developed rash and renal failure after cefepime, was on HD then discontinued became uremic and now again on HD  head MRI 8/17 with acute cerebellar infarct  had renal failure, AIN? family declined renal biopsy started on prednisone  s/p trach 9/1 and BAL with MSSA   ET cx with ESBL and MSSA  blood cx 9/1: MSSA with hypotension  repeat negative 9/4, 9/8, TTE no veg s/p 4 weeks of vanco/dapto through 10/2  L ear edema and otitis externa, s/p a long course of ana cristina, the last cx showed candida and ENT stated it could be fungal (saw black spores?) so was also given 7 days of fluconazole  pt had a rash again, unclear what caused it, fluconazole  still with intermittent fevers and then sputum cx with carbapenem resistant pseudomonas, s/p a course of polymixin  no improvement in mental status and ?seizure as well, neuro following  had drop in Hgb s/p transfusion and then had again febrile 10/15-10.17 with leukocytosis, rash and eosinophilia, sputum growing pseudomonas and proteus  (both sensitive to aztreonam) ?VAP vs a transfusion reaction       * aztreonam renally dosed, will likely complete a 10 day course, started 10/17, now day 2  * if fever or worsening status start aztreonam  * monitor CBC/diff and CMP        The above assessment and plan was discussed with the primary team    Yumiko Conner MD  contact on teams  After 5pm and on weekends call [phone]     73 f with DM, HTN, CKD, breast CA, latent TB (treated first with INH then had rash and switched to rifampin), MSSA bacteremia, c-diff, respiratory arrest and cardiac arrest (2018), s/p trach/PEG then removed, CVA with residual weakness, aspiration PNA, 1/4 sputums had MAC 7/2023, admitted 8/11 with respiratory failure no fever or WBC but was intubated and then spiked  blood cx negative, sputum cx with MSSA  developed rash and renal failure after cefepime, was on HD then discontinued became uremic and now again on HD  head MRI 8/17 with acute cerebellar infarct  had renal failure, AIN? family declined renal biopsy started on prednisone  s/p trach 9/1 and BAL with MSSA   ET cx with ESBL and MSSA  blood cx 9/1: MSSA with hypotension  repeat negative 9/4, 9/8, TTE no veg s/p 4 weeks of vanco/dapto through 10/2  L ear edema and otitis externa, s/p a long course of ana cristina, the last cx showed candida and ENT stated it could be fungal (saw black spores?) so was also given 7 days of fluconazole  pt had a rash again, unclear what caused it, fluconazole  still with intermittent fevers and then sputum cx with carbapenem resistant pseudomonas, s/p a course of polymixin  no improvement in mental status and ?seizure as well, neuro following  had drop in Hgb s/p transfusion and then had again febrile 10/15-10.17 with leukocytosis, rash and eosinophilia, sputum growing pseudomonas and proteus  (both sensitive to aztreonam) ?VAP vs a transfusion reaction       * aztreonam renally dosed, will likely complete a 10 day course, started 10/17, now day 2  * monitor CBC/diff and CMP        The above assessment and plan was discussed with the primary team    Yumiko Conner MD  contact on teams  After 5pm and on weekends call [phone]

## 2023-10-18 NOTE — PROGRESS NOTE ADULT - SUBJECTIVE AND OBJECTIVE BOX
CHIEF COMPLAINT: Patient is a 73y old  Female who presents with a chief complaint of Respiratory distress (17 Oct 2023 12:29)      INTERVAL EVENTS:    REVIEW OF SYSTEMS: Seen by bedside during AM rounds and     Mode: AC/ CMV (Assist Control/ Continuous Mandatory Ventilation), RR (machine): 24, FiO2: 35, PEEP: 8, ITime: 0.8, MAP: 19, PC: 35, PIP: 44      OBJECTIVE:  ICU Vital Signs Last 24 Hrs  T(C): 37.2 (18 Oct 2023 05:18), Max: 37.8 (17 Oct 2023 16:00)  T(F): 99 (18 Oct 2023 05:18), Max: 100 (17 Oct 2023 16:00)  HR: 113 (18 Oct 2023 10:07) (96 - 113)  BP: 111/37 (18 Oct 2023 05:18) (86/55 - 120/59)  BP(mean): 90 (17 Oct 2023 16:00) (64 - 90)  ABP: --  ABP(mean): --  RR: 20 (18 Oct 2023 05:18) (20 - 24)  SpO2: 100% (18 Oct 2023 10:07) (97% - 100%)    O2 Parameters below as of 18 Oct 2023 09:50  Patient On (Oxygen Delivery Method): conventional ventilator          Mode: AC/ CMV (Assist Control/ Continuous Mandatory Ventilation), RR (machine): 24, FiO2: 35, PEEP: 8, ITime: 0.8, MAP: 19, PC: 35, PIP: 44    10-17 @ 07:01  -  10-18 @ 07:00  --------------------------------------------------------  IN: 1380 mL / OUT: 1500 mL / NET: -120 mL      CAPILLARY BLOOD GLUCOSE      POCT Blood Glucose.: 151 mg/dL (18 Oct 2023 06:00)      HOSPITAL MEDICATIONS:  MEDICATIONS  (STANDING):  albuterol    0.083% 2.5 milliGRAM(s) Nebulizer every 6 hours  apixaban 5 milliGRAM(s) Oral every 12 hours  artificial tears (preservative free) Ophthalmic Solution 1 Drop(s) Both EYES every 4 hours  atorvastatin 10 milliGRAM(s) Oral at bedtime  aztreonam  IVPB 2000 milliGRAM(s) IV Intermittent <User Schedule>  chlorhexidine 0.12% Liquid 15 milliLiter(s) Oral Mucosa every 12 hours  chlorhexidine 2% Cloths 1 Application(s) Topical daily  dextrose 5%. 1000 milliLiter(s) (50 mL/Hr) IV Continuous <Continuous>  dextrose 5%. 1000 milliLiter(s) (100 mL/Hr) IV Continuous <Continuous>  dextrose 50% Injectable 12.5 Gram(s) IV Push once  dextrose 50% Injectable 25 Gram(s) IV Push once  dextrose 50% Injectable 25 Gram(s) IV Push once  dextrose 50% Injectable 25 Gram(s) IV Push once  epoetin odalis (EPOGEN) Injectable 19862 Unit(s) IV Push <User Schedule>  ferrous    sulfate Liquid 300 milliGRAM(s) Oral daily  glucagon  Injectable 1 milliGRAM(s) IntraMuscular once  insulin lispro (ADMELOG) corrective regimen sliding scale   SubCutaneous every 6 hours  insulin NPH human recombinant 6 Unit(s) SubCutaneous every 6 hours  levETIRAcetam  Solution 1000 milliGRAM(s) Oral daily  levETIRAcetam  Solution 250 milliGRAM(s) Oral <User Schedule>  midodrine. 30 milliGRAM(s) Oral every 8 hours  pantoprazole  Injectable 40 milliGRAM(s) IV Push two times a day  petrolatum Ophthalmic Ointment 1 Application(s) Both EYES four times a day  sodium chloride 1 Gram(s) Oral two times a day    MEDICATIONS  (PRN):  acetaminophen   Oral Liquid .. 650 milliGRAM(s) Oral every 6 hours PRN Temp greater or equal to 38C (100.4F), Mild Pain (1 - 3)  dextrose Oral Gel 15 Gram(s) Oral once PRN Blood Glucose LESS THAN 70 milliGRAM(s)/deciliter  diphenhydrAMINE Elixir 50 milliGRAM(s) Oral once PRN Rash and/or Itching  sodium chloride 0.9% Bolus. 250 milliLiter(s) IV Bolus every 5 minutes PRN SBP LESS THAN or EQUAL to 90 mmHg  sodium chloride 0.9% lock flush 10 milliLiter(s) IV Push every 1 hour PRN Pre/post blood products, medications, blood draw, and to maintain line patency      PHYSICAL EXAMINATION    LABS:                        7.5    14.17 )-----------( 467      ( 17 Oct 2023 07:02 )             23.5     10-17    131<L>  |  95<L>  |  36<H>  ----------------------------<  231<H>  4.9   |  24  |  1.73<H>    Ca    8.1<L>      17 Oct 2023 07:02  Phos  3.8     10-17  Mg     2.20     10-17        Urinalysis Basic - ( 17 Oct 2023 07:02 )    Color: x / Appearance: x / SG: x / pH: x  Gluc: 231 mg/dL / Ketone: x  / Bili: x / Urobili: x   Blood: x / Protein: x / Nitrite: x   Leuk Esterase: x / RBC: x / WBC x   Sq Epi: x / Non Sq Epi: x / Bacteria: x            PAST MEDICAL & SURGICAL HISTORY:  Breast CA      Diabetes      Stroke      Cardiac arrest      HTN (hypertension)      H/O mastectomy, bilateral          FAMILY HISTORY:  No pertinent family history in first degree relatives        Social History:      RADIOLOGY:  [ ] Reviewed and interpreted by me    PULMONARY FUNCTION TESTS:    EKG: CHIEF COMPLAINT: Patient is a 73y old  Female who presents with a chief complaint of Respiratory distress (17 Oct 2023 12:29)      INTERVAL EVENTS:  - No interval events overnight   - Poor HD tolerance yesterday session and droxidopa added   - Continue on Aztreonam   - Pending MRI BRAIN     REVIEW OF SYSTEMS: Seen by bedside during AM rounds and unable to assess ROS second to vented and encephalopathic     Mode: AC/ CMV (Assist Control/ Continuous Mandatory Ventilation), RR (machine): 24, FiO2: 35, PEEP: 8, ITime: 0.8, MAP: 19, PC: 35, PIP: 44      OBJECTIVE:  ICU Vital Signs Last 24 Hrs  T(C): 37.2 (18 Oct 2023 05:18), Max: 37.8 (17 Oct 2023 16:00)  T(F): 99 (18 Oct 2023 05:18), Max: 100 (17 Oct 2023 16:00)  HR: 113 (18 Oct 2023 10:07) (96 - 113)  BP: 111/37 (18 Oct 2023 05:18) (86/55 - 120/59)  BP(mean): 90 (17 Oct 2023 16:00) (64 - 90)  ABP: --  ABP(mean): --  RR: 20 (18 Oct 2023 05:18) (20 - 24)  SpO2: 100% (18 Oct 2023 10:07) (97% - 100%)    O2 Parameters below as of 18 Oct 2023 09:50  Patient On (Oxygen Delivery Method): conventional ventilator          Mode: AC/ CMV (Assist Control/ Continuous Mandatory Ventilation), RR (machine): 24, FiO2: 35, PEEP: 8, ITime: 0.8, MAP: 19, PC: 35, PIP: 44    10-17 @ 07:01  -  10-18 @ 07:00  --------------------------------------------------------  IN: 1380 mL / OUT: 1500 mL / NET: -120 mL      CAPILLARY BLOOD GLUCOSE  POCT Blood Glucose.: 151 mg/dL (18 Oct 2023 06:00)      HOSPITAL MEDICATIONS:  MEDICATIONS  (STANDING):  albuterol    0.083% 2.5 milliGRAM(s) Nebulizer every 6 hours  apixaban 5 milliGRAM(s) Oral every 12 hours  artificial tears (preservative free) Ophthalmic Solution 1 Drop(s) Both EYES every 4 hours  atorvastatin 10 milliGRAM(s) Oral at bedtime  aztreonam  IVPB 2000 milliGRAM(s) IV Intermittent <User Schedule>  chlorhexidine 0.12% Liquid 15 milliLiter(s) Oral Mucosa every 12 hours  chlorhexidine 2% Cloths 1 Application(s) Topical daily  dextrose 5%. 1000 milliLiter(s) (50 mL/Hr) IV Continuous <Continuous>  dextrose 5%. 1000 milliLiter(s) (100 mL/Hr) IV Continuous <Continuous>  dextrose 50% Injectable 12.5 Gram(s) IV Push once  dextrose 50% Injectable 25 Gram(s) IV Push once  dextrose 50% Injectable 25 Gram(s) IV Push once  dextrose 50% Injectable 25 Gram(s) IV Push once  epoetin odalis (EPOGEN) Injectable 62596 Unit(s) IV Push <User Schedule>  ferrous    sulfate Liquid 300 milliGRAM(s) Oral daily  glucagon  Injectable 1 milliGRAM(s) IntraMuscular once  insulin lispro (ADMELOG) corrective regimen sliding scale   SubCutaneous every 6 hours  insulin NPH human recombinant 6 Unit(s) SubCutaneous every 6 hours  levETIRAcetam  Solution 1000 milliGRAM(s) Oral daily  levETIRAcetam  Solution 250 milliGRAM(s) Oral <User Schedule>  midodrine. 30 milliGRAM(s) Oral every 8 hours  pantoprazole  Injectable 40 milliGRAM(s) IV Push two times a day  petrolatum Ophthalmic Ointment 1 Application(s) Both EYES four times a day  sodium chloride 1 Gram(s) Oral two times a day    MEDICATIONS  (PRN):  acetaminophen   Oral Liquid .. 650 milliGRAM(s) Oral every 6 hours PRN Temp greater or equal to 38C (100.4F), Mild Pain (1 - 3)  dextrose Oral Gel 15 Gram(s) Oral once PRN Blood Glucose LESS THAN 70 milliGRAM(s)/deciliter  diphenhydrAMINE Elixir 50 milliGRAM(s) Oral once PRN Rash and/or Itching  sodium chloride 0.9% Bolus. 250 milliLiter(s) IV Bolus every 5 minutes PRN SBP LESS THAN or EQUAL to 90 mmHg  sodium chloride 0.9% lock flush 10 milliLiter(s) IV Push every 1 hour PRN Pre/post blood products, medications, blood draw, and to maintain line patency      PHYSICAL EXAMINATION  General: NAD   HEENT: Trach present   Cards: S1/S2, no murmurs   Pulm: Course vent sounds bilaterally with rhonchi and crackles. No wheezes.   Abdomen: Soft, NTND. BS (+)   Extremities: Generalized anasarca with 3+ pitting edema to BL LE. No AROM x 4  Neurology: Eyes closed only grimaces with no focal neurological deficits     LABS:                        7.5    14.17 )-----------( 467      ( 17 Oct 2023 07:02 )             23.5     10-17    131<L>  |  95<L>  |  36<H>  ----------------------------<  231<H>  4.9   |  24  |  1.73<H>    Ca    8.1<L>      17 Oct 2023 07:02  Phos  3.8     10-17  Mg     2.20     10-17        Urinalysis Basic - ( 17 Oct 2023 07:02 )    Color: x / Appearance: x / SG: x / pH: x  Gluc: 231 mg/dL / Ketone: x  / Bili: x / Urobili: x   Blood: x / Protein: x / Nitrite: x   Leuk Esterase: x / RBC: x / WBC x   Sq Epi: x / Non Sq Epi: x / Bacteria: x            PAST MEDICAL & SURGICAL HISTORY:  Breast CA      Diabetes      Stroke      Cardiac arrest      HTN (hypertension)      H/O mastectomy, bilateral          FAMILY HISTORY:  No pertinent family history in first degree relatives        Social History:      RADIOLOGY:  [ ] Reviewed and interpreted by me    PULMONARY FUNCTION TESTS:    EKG:

## 2023-10-18 NOTE — PROGRESS NOTE ADULT - SUBJECTIVE AND OBJECTIVE BOX
Follow Up:  MSSA bacteremia, otitis external, VAP, fever    Interval History: no more fever, WBC: 14 with increased eos and a rash    ROS:  unable to obtain because: trached, AMS    Allergies  isoniazid (Rash)  nafcillin (Unknown)  hydrALAZINE (Rash)  vitamin E (Short breath; Urticaria; Hives)  doxycycline (Rash)  cefepime (Rash)  NIFEdipine (Urticaria; Hives)  Zosyn (Rash)        ANTIMICROBIALS:  aztreonam  IVPB 2000 <User Schedule>      OTHER MEDS:  acetaminophen   Oral Liquid .. 650 milliGRAM(s) Oral every 6 hours PRN  albuterol    0.083% 2.5 milliGRAM(s) Nebulizer every 6 hours  apixaban 5 milliGRAM(s) Oral every 12 hours  artificial tears (preservative free) Ophthalmic Solution 1 Drop(s) Both EYES every 4 hours  atorvastatin 10 milliGRAM(s) Oral at bedtime  chlorhexidine 0.12% Liquid 15 milliLiter(s) Oral Mucosa every 12 hours  chlorhexidine 2% Cloths 1 Application(s) Topical daily  dextrose 5%. 1000 milliLiter(s) IV Continuous <Continuous>  dextrose 5%. 1000 milliLiter(s) IV Continuous <Continuous>  dextrose 50% Injectable 12.5 Gram(s) IV Push once  dextrose 50% Injectable 25 Gram(s) IV Push once  dextrose 50% Injectable 25 Gram(s) IV Push once  dextrose 50% Injectable 25 Gram(s) IV Push once  dextrose Oral Gel 15 Gram(s) Oral once PRN  diphenhydrAMINE Elixir 50 milliGRAM(s) Oral once PRN  droxidopa 100 milliGRAM(s) Oral three times a day  epoetin odalis (EPOGEN) Injectable 45532 Unit(s) IV Push <User Schedule>  ferrous    sulfate Liquid 300 milliGRAM(s) Oral daily  glucagon  Injectable 1 milliGRAM(s) IntraMuscular once  insulin lispro (ADMELOG) corrective regimen sliding scale   SubCutaneous every 6 hours  insulin NPH human recombinant 6 Unit(s) SubCutaneous every 6 hours  levETIRAcetam  Solution 1000 milliGRAM(s) Oral daily  levETIRAcetam  Solution 250 milliGRAM(s) Oral <User Schedule>  midodrine. 30 milliGRAM(s) Oral every 8 hours  pantoprazole  Injectable 40 milliGRAM(s) IV Push two times a day  petrolatum Ophthalmic Ointment 1 Application(s) Both EYES four times a day  sodium chloride 1 Gram(s) Oral two times a day  sodium chloride 0.9% Bolus. 250 milliLiter(s) IV Bolus every 5 minutes PRN  sodium chloride 0.9% lock flush 10 milliLiter(s) IV Push every 1 hour PRN      Vital Signs Last 24 Hrs  T(C): 37.2 (18 Oct 2023 05:18), Max: 37.2 (17 Oct 2023 21:51)  T(F): 99 (18 Oct 2023 05:18), Max: 99 (17 Oct 2023 21:51)  HR: 109 (18 Oct 2023 15:52) (101 - 113)  BP: 111/37 (18 Oct 2023 05:18) (93/37 - 115/30)  BP(mean): --  RR: 20 (18 Oct 2023 05:18) (20 - 20)  SpO2: 100% (18 Oct 2023 15:52) (97% - 100%)    Parameters below as of 18 Oct 2023 15:52  Patient On (Oxygen Delivery Method): conventional ventilator        Physical Exam:  General:    trached   Respiratory:   trach on vent  abd:  distended but soft  :  no  willard  Musculoskeletal : generalized edema  Skin: erythematous rash on chest, UE  vascular: TRISHA doom                           7.5    14.17 )-----------( 467      ( 17 Oct 2023 07:02 )             23.5       10-17    131<L>  |  95<L>  |  36<H>  ----------------------------<  231<H>  4.9   |  24  |  1.73<H>    Ca    8.1<L>      17 Oct 2023 07:02  Phos  3.8     10-17  Mg     2.20     10-17        Urinalysis Basic - ( 17 Oct 2023 07:02 )    Color: x / Appearance: x / SG: x / pH: x  Gluc: 231 mg/dL / Ketone: x  / Bili: x / Urobili: x   Blood: x / Protein: x / Nitrite: x   Leuk Esterase: x / RBC: x / WBC x   Sq Epi: x / Non Sq Epi: x / Bacteria: x        MICROBIOLOGY:  v  .Blood Blood-Venous  10-17-23   No growth at 24 hours  --  --      .Blood Blood-Peripheral  10-14-23   No growth at 72 Hours  --  --      Trach Asp Tracheal Aspirate  10-14-23   Numerous Pseudomonas aeruginosa (Carbapenem Resistant)  Numerous Proteus mirabilis  Normal Respiratory Cate present  --  Pseudomonas aeruginosa (Carbapenem Resistant)  Proteus mirabilis      .Blood Blood-Venous  10-09-23   No growth at 5 days  --  --      Trach Asp Tracheal Aspirate  10-03-23   Numerous Pseudomonas aeruginosa (Carbapenem Resistant) Susceptibility to  follow.  Normal Respiratory Cate absent  --  Pseudomonas aeruginosa (Carbapenem Resistant)      .Blood Blood-Venous  09-29-23   No growth at 5 days  --  --      .Blood Blood-Peripheral  09-29-23   No growth at 5 days  --  --      ET Tube ET Tube  09-29-23   Rare Yeast  --  --      ET Tube ET Tube  09-29-23   Numerous Proteus mirabilis  Moderate Pseudomonas aeruginosa (Carbapenem Resistant)  Normal Respiratory Cate absent  --  Proteus mirabilis  Pseudomonas aeruginosa (Carbapenem Resistant)      Clean Catch Clean Catch (Midstream)  09-26-23   10,000 - 49,000 CFU/mL Candida albicans "Susceptibilities not performed"  --  --      .Blood Blood-Peripheral  09-26-23   No growth at 5 days  --  --      .Blood Blood-Venous  09-20-23   No growth at 5 days  --  --      .Blood Blood-Peripheral  09-20-23   No growth at 5 days  --  --          Rapid RVP Result: NotDetec (10-14 @ 05:10)        RADIOLOGY:  Images independently visualized and reviewed personally, findings as below  < from: Xray Chest 1 View AP/PA (10.16.23 @ 14:24) >  IMPRESSION:  Enteric tube with tip in the stomach.    Bibasilar patchy opacities.    Bilateral layering effusions, with right increased in latest image.    < end of copied text >  < from: CT Head No Cont (10.03.23 @ 20:46) >  IMPRESSION:    No acute intracranial hemorrhage or mass effect. Evaluation of the   posterior fossa degraded by streak artifact from dental amalgam.   Lucencies in the bilateral cerebellum may be artifactual.    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    Bilateral middle ear and mastoid effusions which are also seen on prior   exam.    < end of copied text >

## 2023-10-18 NOTE — PROGRESS NOTE ADULT - PROBLEM SELECTOR PLAN 1
Multifactorial     - Aspiration, acute on chronic diastolic CHF   s/p trach 9/1  now with likely sepsis   On Abx  Prognosis very poor. Discussed with  at bedside

## 2023-10-18 NOTE — PROGRESS NOTE ADULT - ASSESSMENT
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 While in MICU patient noted with worsening renal failure requiring HD initiation, CGE/ Melena s/p EGD 8/31 found with esophagitis and erosive gastropathy, new right cerebellar infarct and fevers second to ESBL COLI and MSSA VAP, MSSA bacteremia, left ear otitis externa and drug rxn to cephalosporins. 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 Case discussed at length renal and given good UOP attempted renal recovery, however failed, and upon reinitiation of HD attempt. R IJ SHILEY failed. Attempted volume removal with Bumex GTT however course complicated by hypotension requiring transfer back to MICU 9/26 for pressor support. Diuresis dc'ed, pressors weaned off and stress steroids started. L IJ SHILEY placed (9/30) and HD reinstated. Course further complicated by AOCD and  PSA and Proteus VAP. Patient transferred back to RCU 10/1 with course complicated by volume overload and grossly resistant organisms. Course further c/b intermittent persistent fevers and allergic transfusion reaction s/p PRBC transfusion (10/15).    NEUROLOGY  # NEW cerebella infarct   - Baseline MS AOX3 with short term memory changes per family however noted with concern for poor mentation in ICU  - MRI (8/17) with NEW right cerebellar infarct and old left PCA/occipital infarcts  - STEPHANIE (8/17) with AV showing two linear mobile echogenic elements attached to valve leaflets consistent with Lambel's excrescence, but no TRINITY thrombus, and lambel's excrescence with uncertain clinical implication.   - EEG (8/11-8/15) with no seizures   - ILR interrogation (9/7) with SR and PACs falsely recorded as AF.   - Continue on ASA and lipitor for medical management     # Seizures   - Course complicated by questionable head and body shaking with prolactin elevated 9/8. Discussed for spot EEG however held given low likelihood of seizures noted and acute respiratory illnesses   - Course further complicated with right arm twitching and RPT EEG (10/4) with no seizure however but noted with increase seizure potential in left posterior quadrants.   - RPT EEG (10/5) with possible focal seizures and risk of focal-onset seizures from multiple locations, most prominent in the left posterior temporal/posterocentral region  - RPT EEG (10/6) with RUE twitching are likely non-epileptic in nature, however risk of focal-onset seizures from multiple locations  - Continue on Keppra PPX     # Metabolic vs Uremic Encephalopathy   - Lethargy noted with progression to stupor thought to be second to uremia.   - RPT CTH (9/26) with vague areas of hypoattenuation within the bilateral cerebellar hemispheres which may be compatible with acute/subacute ischemia.   - Discussed CTH with neurology and no signs of new infarct noted.   - HD reinstated in ICU with improvement in uremia however mentation remained   - Poor mentation likely in setting of toxic encephalopathy in setting of infections.     CARDIOVASCULAR  # HTN Urgency   # Septic vs vasoplegic shock   - Labile BPs ranging in between SBP 80s-200s and attempted labetalol, however noted with hypotension and bradycardia likely from BB toxicity vs shock state?    - Holding Labetalol, Catapres and Catapres.    - Attempted volume removal with bumex diuresis however noted with return of shock state requiring pressor support and transfer back to ICU.   - Pressors weaned off to stress dose (last day 10/4) and Midodrine   - Continue on Midodrine 30 TID (9/29 - ) however continued labile BPs with difficult HD sessions. Droxiopa 100 TID added (10/18 - )   - Ensure Midodrine and Droxidopa are timed 30-60 minutes prior to HD  - DO NOT HOLD DROXIDOPA AND MIDODRINE     # Hx of HFpEF with likely ADHF with volume overload.   - ECHO (7/2023) with EF 59 with severe LVH and diastolic dysfunction   - STEPHANIE (8/18) with normal LVRVSF however noted with increased LA pressures and persistent LA septal bowing into RA.   - ECHO (8/11) with EF 60 with mild LVH, normal LVRVSF, and mild ddfxn.  - Grossly volume overloaded and removing volume with HD midodrine assisted sessions     HEENT   # Left ear OE with acute on chronic left-sided otomastoiditis  - ENT evaluation (9/7) with no mastoid tenderness, however found with positive swelling and excoriation to left auricle and tenderness to manipulation of auricle. Debrided crusting of auricle and EAC evaluated with edema and unable to visualize TM. EAC debrided and found with watery exudate.   - CT IAC with acute on chronic left-sided otitis externa and acute on chronic left-sided otomastoiditis. Superimposed left-sided tympanosclerosis. Superimposed cholesteatoma formation within the left tympanomastoid cavity cannot be excluded  - Completed CiproHC (9/5 - 9/16), Bradford (9/1-10/1) and acetic acid and bacitracin.   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   - Continue on empiric valtrex 500mg BID (9/29 - 10/8) per ophtho and ID  - Continue preservative free artificial tears 4 times a day in the both eye  - Continue lacrilube ointment twice a day in the both eyes     # Oral Lesions with questionable Zoster vs Trauma?   - Patient with tongue pain and seen with anterior ulcerations consolidating and crusting and posterior ulceration.  - Case discussed with pathology resident and culture/ cytology sent.   - HSV negative and Path (9/6) with normal appearing epithelial cells singly and in clusters devoid of viral cytopathic effect.   - Continue on magic mouth wash for pain    RESPIRATORY  # AHHRF second to volume overload   - Hx of CKD and HFpEF presented with SOB and hypercarbia second to volume overload requiring intubation and HD initiation   - Vent weaning attempted however limited requiring tracheostomy (9/1) with IP   - Course complicated by PIPs elevated and found with tracheomalacia and volume overloaded.   - CT CHEST (9/8) with tracheomalacia, BL pleural effusions and continued consolidations   - Continue on vent and given TBM increased PEEP to 8 with improvement in dynamic collapse, but PIP waxes and wanes with volume overloaded and secretions.  - Changed vent to PC 24/35/8/40 with slight improvement in PIP (40s)   - Continue on albuterol and chest PT  - Continue volume removal with HD   - Attempted HTS but noted with hemoptysis and held   - Hold Atrovent given thick secretions   - Hold IPV given high inspiratory pressures      GI  # UGIB   - CGE x 1 episode on 8/24 and melena x 2 episodes on 8/31 likely residual blood.   - EGD (8/31) found with esophagitis and erosive gastropathy  - Continue on PPI BID through late October and then PPI QD     # Dysphagia   - NGT-TF continued   - Pending PEG however needs improvement prior to placement.     RENAL  # Progressive CKD   - Presented volume overload and progressive RUSS on CKD   - POCUS with no signs of hydronephrosis in ICU  - Pressor support started with improvement in renal function in ICU  - Attempted steroid course in ICU without improvement in renal function   - Case discussed at length with renal and initiated on HD in ICU   - Remain on HD while in RCU however noted to be volume overloaded. Attempted Bumex pushes renal recovery and patient noted with good UOP, but BUN/ CRE continued to rise without improvement on diuresis.   - Ultimately resumed on HD MWF TIW with midodrine assisted volume removal, however pressure remains soft with difficulty removing aggressive fluids.   - Added further pressor support as above and will continue to attempt HD/ UF     # Hyponatremia   - Continue on salt tabs 1G  (decreased 10/6) and weaning off as tolerated with volume removal. Monitor sodium     # Hyperphosphatemia to Hypophosphatemia with HD  - PTH normal and elevated phos likely from renal failure  - Phos binder with Renvela started with improvement however then noted with hypophosphatemia and Renvela stopped.     INFECTIOUS DISEASE  # ESBL ECOLI and MSSA VAP c/b MSSA bacteremia   - RVP/ COVID (8/11) negative   - SCx (8/11) with MSSA s/p cefepime (8/12-8/13) and then ancef (8/14) however noted with drug reaction and then changed to vanco and aztreonam (8/12-8/13) in ICU .   - Course complicated by recurrent fevers and return of MSSA with bacteremia.   - SCx (9/1) with MSSA and ESBL ECOLI   - BAL (9/1) with MSSA   - BCx (9/1) with MSSA and cleared on (9/4)   - ECHO (9/6) unable to rule out endocarditis   - Continue on Vanco (9/4-9/22) however noted with rashes of uncertain etiology and replaced with Bradford (9/4 - 10/2) and DAPTO (9/26-10/2) for  completion.     # Proteus and  PSA VAP/ Trachitis   - Continued fevers and SCx (9/29) with MDR  PSA and Proteus   - Continued on Bradford as above however noted with resistance and changed to Zosyn (10/2 - 10/5) with course complicated by possible drug rxn. Zosyn changed to Aztreonam (10/5 - 10/6) however RPT SCx (10/3) with  PSA however only intermediate coverage to aztreonam and amikacin.  PSA grossly resistant to other antibiotics other than cephalosporins however patient with reactions in the past. Case discussed with ID and ID pharmacist and change to Polymyxin (10/6 - 10/15) however continues to spike fevers likely second to Polymyxin only intermediate coverage and Recarbio resistant .   - Return of fever and RPT Sputum (10/14) wtith  PSA now sensitive on Aztreonam (10/16 - 10/25) x 10 days.   - Overall difficulty with ABX tailoring given allergic rxns and resistant organisms.     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  - HH remained labile requiring xfusion (10/15) however noted with questionable reaction with rash and BB called. Work up being sent.   - Monitor HH     VASCULAR   # LLE POP DVT   - US BL LE (9/10) with acute left deep vein thrombosis of the left popliteal vein.  - RUE swollen and VA DOPPLER RUE (10/3) with R IJ DVT and R brachial DVT.  - Eliquis held for permacath however procedure held.   - Eliquis remains on hold second to mild suction trauma hemoptysis.   - Resume Eliquis when able.      # HD access  - L IJ CLAUDIA (9/27 - )   - Planned for IR permacath cancelled for today and will reattempt when infectious status improves.     ONC   # Hx of Breast CA   - Patient dx in 2018 and s/p BL mastectomy (radical on right) and chemo and RT.   - Supportive care.     ENDOCRINE  # IDDM2   - Continued on NPH with ISS, however noted with hypoglycemic episodes and NPH dc'ed.    - Finger sticks trending up off NPH.   - Continue on NPH 6U with moderate ISS.   - Adjust as need     SKIN  # Drug Eruption   - Attempted cefepime (8/12) vs ancef (8/13-8/14) and then noted with drug rxn in ICU. Continue on steroids with prednisone 40 (8/18 - 9/2) then prednisone 30 (9/3-9/7), then prednisone 20 (9/8 - 9/10), then prednisone 10mg (9/11-9/13) then turn off.   - Attempted Zosyn (10/2-10/5) with possible rash. Zosyn turned off with improvement.   - Hold further cephalosporins or PCNs at this time   - Monitor for further drug rxns.     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 While in MICU patient noted with worsening renal failure requiring HD initiation, CGE/ Melena s/p EGD 8/31 found with esophagitis and erosive gastropathy, new right cerebellar infarct and fevers second to ESBL COLI and MSSA VAP, MSSA bacteremia, left ear otitis externa and drug rxn to cephalosporins. 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 Case discussed at length renal and given good UOP attempted renal recovery, however failed, and upon reinitiation of HD attempt. R IJ SHILEY failed. Attempted volume removal with Bumex GTT however course complicated by hypotension requiring transfer back to MICU 9/26 for pressor support. Diuresis dc'ed, pressors weaned off and stress steroids started. L IJ SHILEY placed (9/30) and HD reinstated. Course further complicated by AOCD and  PSA and Proteus VAP. Patient transferred back to RCU 10/1 with course complicated by volume overload and grossly resistant organisms. Course further c/b intermittent persistent fevers and allergic transfusion reaction s/p PRBC transfusion (10/15).    NEUROLOGY  # NEW cerebella infarct   - Baseline MS AOX3 with short term memory changes per family however noted with concern for poor mentation in ICU  - MRI (8/17) with NEW right cerebellar infarct and old left PCA/occipital infarcts  - STEPHANIE (8/17) with AV showing two linear mobile echogenic elements attached to valve leaflets consistent with Lambel's excrescence, but no TRINITY thrombus, and lambel's excrescence with uncertain clinical implication.   - EEG (8/11-8/15) with no seizures   - ILR interrogation (9/7) with SR and PACs falsely recorded as AF.   - Continue on ASA and lipitor for medical management     # Seizures   - Course complicated by questionable head and body shaking with prolactin elevated 9/8. Discussed for spot EEG however held given low likelihood of seizures noted and acute respiratory illnesses   - Course further complicated with right arm twitching and RPT EEG (10/4) with no seizure however but noted with increase seizure potential in left posterior quadrants.   - RPT EEG (10/5) with possible focal seizures and risk of focal-onset seizures from multiple locations, most prominent in the left posterior temporal/posterocentral region  - RPT EEG (10/6) with RUE twitching are likely non-epileptic in nature, however risk of focal-onset seizures from multiple locations  - Continue on Keppra PPX     # Metabolic vs Uremic Encephalopathy   - Lethargy noted with progression to stupor thought to be second to uremia.   - RPT CTH (9/26) with vague areas of hypoattenuation within the bilateral cerebellar hemispheres which may be compatible with acute/subacute ischemia.   - Discussed CTH with neurology and no signs of new infarct noted.   - HD reinstated in ICU with improvement in uremia however mentation remained   - Poor mentation likely in setting of toxic encephalopathy in setting of infections.   - Pending MRI BRAIN    CARDIOVASCULAR  # HTN Urgency   # Septic vs vasoplegic shock   - Labile BPs ranging in between SBP 80s-200s and attempted labetalol, however noted with hypotension and bradycardia likely from BB toxicity vs shock state?    - Holding Labetalol, Catapres and Catapres.    - Attempted volume removal with bumex diuresis however noted with return of shock state requiring pressor support and transfer back to ICU.   - Pressors weaned off to stress dose (last day 10/4) and Midodrine   - Continue on Midodrine 30 TID (9/29 - ) however continued labile BPs with difficult HD sessions. Droxiopa 100 TID added (10/18 - )   - Ensure Midodrine and Droxidopa are timed 30-60 minutes prior to HD  - DO NOT HOLD DROXIDOPA AND MIDODRINE     # Hx of HFpEF with likely ADHF with volume overload.   - ECHO (7/2023) with EF 59 with severe LVH and diastolic dysfunction   - STEPHANIE (8/18) with normal LVRVSF however noted with increased LA pressures and persistent LA septal bowing into RA.   - ECHO (8/11) with EF 60 with mild LVH, normal LVRVSF, and mild ddfxn.  - Grossly volume overloaded and removing volume with HD midodrine assisted sessions     HEENT   # Left ear OE with acute on chronic left-sided otomastoiditis  - ENT evaluation (9/7) with no mastoid tenderness, however found with positive swelling and excoriation to left auricle and tenderness to manipulation of auricle. Debrided crusting of auricle and EAC evaluated with edema and unable to visualize TM. EAC debrided and found with watery exudate.   - CT IAC with acute on chronic left-sided otitis externa and acute on chronic left-sided otomastoiditis. Superimposed left-sided tympanosclerosis. Superimposed cholesteatoma formation within the left tympanomastoid cavity cannot be excluded  - Completed CiproHC (9/5 - 9/16), Bradford (9/1-10/1) and acetic acid and bacitracin.   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   - Continue on empiric valtrex 500mg BID (9/29 - 10/8) per ophtho and ID  - Continue preservative free artificial tears 4 times a day in the both eye  - Continue lacrilube ointment twice a day in the both eyes     # Oral Lesions with questionable Zoster vs Trauma?   - Patient with tongue pain and seen with anterior ulcerations consolidating and crusting and posterior ulceration.  - Case discussed with pathology resident and culture/ cytology sent.   - HSV negative and Path (9/6) with normal appearing epithelial cells singly and in clusters devoid of viral cytopathic effect.   - Continue on magic mouth wash for pain    RESPIRATORY  # AHHRF second to volume overload   - Hx of CKD and HFpEF presented with SOB and hypercarbia second to volume overload requiring intubation and HD initiation   - Vent weaning attempted however limited requiring tracheostomy (9/1) with IP   - Course complicated by PIPs elevated and found with tracheomalacia and volume overloaded.   - CT CHEST (9/8) with tracheomalacia, BL pleural effusions and continued consolidations   - Continue on vent and given TBM increased PEEP to 8 with improvement in dynamic collapse, but PIP waxes and wanes with volume overloaded and secretions.  - Changed vent to PC 24/35/8/40 with slight improvement in PIP (40s)   - Continue on albuterol and chest PT  - Continue volume removal with HD   - Attempted HTS but noted with hemoptysis and held   - Hold Atrovent given thick secretions   - Hold IPV given high inspiratory pressures      GI  # UGIB   - CGE x 1 episode on 8/24 and melena x 2 episodes on 8/31 likely residual blood.   - EGD (8/31) found with esophagitis and erosive gastropathy  - Continue on PPI BID through late October and then PPI QD     # Dysphagia   - NGT-TF continued   - Pending PEG however needs improvement prior to placement.     RENAL  # Progressive CKD   - Presented volume overload and progressive RUSS on CKD   - POCUS with no signs of hydronephrosis in ICU  - Pressor support started with improvement in renal function in ICU  - Attempted steroid course in ICU without improvement in renal function   - Case discussed at length with renal and initiated on HD in ICU   - Remain on HD while in RCU however noted to be volume overloaded. Attempted Bumex pushes renal recovery and patient noted with good UOP, but BUN/ CRE continued to rise without improvement on diuresis.   - Ultimately resumed on HD MWF TIW with midodrine assisted volume removal, however pressure remains soft with difficulty removing aggressive fluids.   - Added further pressor support as above and will continue to attempt HD/ UF     # Hyponatremia   - Continue on salt tabs 1G  (decreased 10/6) and weaning off as tolerated with volume removal. Monitor sodium     # Hyperphosphatemia to Hypophosphatemia with HD  - PTH normal and elevated phos likely from renal failure  - Phos binder with Renvela started with improvement however then noted with hypophosphatemia and Renvela stopped.     INFECTIOUS DISEASE  # ESBL ECOLI and MSSA VAP c/b MSSA bacteremia   - RVP/ COVID (8/11) negative   - SCx (8/11) with MSSA s/p cefepime (8/12-8/13) and then ancef (8/14) however noted with drug reaction and then changed to vanco and aztreonam (8/12-8/13) in ICU .   - Course complicated by recurrent fevers and return of MSSA with bacteremia.   - SCx (9/1) with MSSA and ESBL ECOLI   - BAL (9/1) with MSSA   - BCx (9/1) with MSSA and cleared on (9/4)   - ECHO (9/6) unable to rule out endocarditis   - Continue on Vanco (9/4-9/22) however noted with rashes of uncertain etiology and replaced with Bradford (9/4 - 10/2) and DAPTO (9/26-10/2) for  completion.     # Proteus and  PSA VAP/ Trachitis   - Continued fevers and SCx (9/29) with MDR  PSA and Proteus   - Continued on Bradford as above however noted with resistance and changed to Zosyn (10/2 - 10/5) with course complicated by possible drug rxn. Zosyn changed to Aztreonam (10/5 - 10/6) however RPT SCx (10/3) with  PSA however only intermediate coverage to aztreonam and amikacin.  PSA grossly resistant to other antibiotics other than cephalosporins however patient with reactions in the past. Case discussed with ID and ID pharmacist and change to Polymyxin (10/6 - 10/15) however continues to spike fevers likely second to Polymyxin only intermediate coverage and Recarbio resistant .   - Return of fever and RPT Sputum (10/14) wtith  PSA now sensitive on Aztreonam (10/16 - 10/25) x 10 days.   - Overall difficulty with ABX tailoring given allergic rxns and resistant organisms.     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  - HH remained labile requiring xfusion (10/15) however noted with questionable reaction with rash and BB called. Work up being sent.   - Monitor HH     VASCULAR   # LLE POP DVT   - US BL LE (9/10) with acute left deep vein thrombosis of the left popliteal vein.  - RUE swollen and VA DOPPLER RUE (10/3) with R IJ DVT and R brachial DVT.  - Eliquis held for permacath however procedure held.   - Eliquis remains on hold second to mild suction trauma hemoptysis.   - Resume Eliquis when able.      # HD access  - L IJ CLAUDIA (9/27 - )   - Planned for IR permacath cancelled for today and will reattempt when infectious status improves.     ONC   # Hx of Breast CA   - Patient dx in 2018 and s/p BL mastectomy (radical on right) and chemo and RT.   - Supportive care.     ENDOCRINE  # IDDM2   - Continued on NPH with ISS, however noted with hypoglycemic episodes and NPH dc'ed.    - Finger sticks trending up off NPH.   - Continue on NPH 6U with moderate ISS.   - Adjust as need     SKIN  # Drug Eruption   - Attempted cefepime (8/12) vs ancef (8/13-8/14) and then noted with drug rxn in ICU. Continue on steroids with prednisone 40 (8/18 - 9/2) then prednisone 30 (9/3-9/7), then prednisone 20 (9/8 - 9/10), then prednisone 10mg (9/11-9/13) then turn off.   - Attempted Zosyn (10/2-10/5) with possible rash. Zosyn turned off with improvement.   - Hold further cephalosporins or PCNs at this time   - Monitor for further drug rxns.     ETHICS/ GOC    - Attempted palliative discussion however family not interested and wishes for FULL CODE    DISPO - TBD

## 2023-10-18 NOTE — PROGRESS NOTE ADULT - ASSESSMENT
IMPRESSION: 73F w/ HTN, DM2, CVA, breast CA-bilateral mastectomy, recurrent aspiration pneumonia/respiratory failure, and CKD, 8/11/23 p/w acute hypercapnic respiratory failure; c/b RUSS    (1)Renal - RUSS on CKD4 ==>now newly ESRD. s/p short HD Monday due to poor catheter flow s/p cathflo, if the catheter continues to function poorly, at some point we will have to plan to exchange it    (2)Hyponatremia - improved/stable as of late with HD    (3)CV- tenuous hemodynamics such with each passing week we have more difficulty performing HD/achieving UF, persistent issue.    (4)ID- BCx from 10/14/23 NGTD, repeat cultures pending from yesterday - on IV abx     (5)Pulm- vent-dependent    RECOMMEND:  (1)Next HD tomorrow: 3hrs, 1.5kg UF as able, pressors and sodium modelling as needed to keep SBP>90; Epogen with HD   (2)Tunneled cath placement if/when clinically optimized for the procedure  (3)Dose new meds for GFR <10/HD.     Nilda Espinoza, HEAVEN  [facility]  [phone]      IMPRESSION: 73F w/ HTN, DM2, CVA, breast CA-bilateral mastectomy, recurrent aspiration pneumonia/respiratory failure, and CKD, 8/11/23 p/w acute hypercapnic respiratory failure; c/b RUSS    (1)Renal - RUSS on CKD4 ==>now newly ESRD. s/p short HD Monday due to poor catheter flow s/p cathflo, if the catheter continues to function poorly, at some point we will have to plan to exchange it    (2)Hyponatremia - improved/stable as of late with HD    (3)CV- tenuous hemodynamics such with each passing week we have more difficulty performing HD/achieving UF, persistent issue.    (4)ID- BCx from 10/14/23 NGTD, repeat cultures pending from yesterday - on IV abx     (5)Pulm- vent-dependent    RECOMMEND:  (1)Next HD tomorrow: 3hrs, 1.5kg UF as able, pressors and sodium modelling as needed to keep SBP>90; Epogen with HD   (2)Dose new meds for GFR <10/HD.     Nilda Espinoza DNP  [facility]  [phone]       RENAL ATTENDING NOTE  Patient seen and examined with NP. Agree with assessment and plan as above. Can plan for next HD tomorrow, with goal 1.5L UF. Plan of care discussed this a.m. with Pulm-Crit Care attending Dr. Gita Lisker. I agree with plan to escalate enteral pressor agents to minimize hypotension/maximize potential UF.           Jimmy Colon MD  [facility]  [phone]

## 2023-10-18 NOTE — PROGRESS NOTE ADULT - SUBJECTIVE AND OBJECTIVE BOX
NEPHROLOGY     Patient seen and examined with spouse at bedside, pt salbador to paula.     MEDICATIONS  (STANDING):  albuterol    0.083% 2.5 milliGRAM(s) Nebulizer every 6 hours  apixaban 5 milliGRAM(s) Oral every 12 hours  artificial tears (preservative free) Ophthalmic Solution 1 Drop(s) Both EYES every 4 hours  atorvastatin 10 milliGRAM(s) Oral at bedtime  aztreonam  IVPB 2000 milliGRAM(s) IV Intermittent <User Schedule>  chlorhexidine 0.12% Liquid 15 milliLiter(s) Oral Mucosa every 12 hours  chlorhexidine 2% Cloths 1 Application(s) Topical daily  dextrose 5%. 1000 milliLiter(s) (50 mL/Hr) IV Continuous <Continuous>  dextrose 5%. 1000 milliLiter(s) (100 mL/Hr) IV Continuous <Continuous>  dextrose 50% Injectable 12.5 Gram(s) IV Push once  dextrose 50% Injectable 25 Gram(s) IV Push once  dextrose 50% Injectable 25 Gram(s) IV Push once  dextrose 50% Injectable 25 Gram(s) IV Push once  droxidopa 100 milliGRAM(s) Oral three times a day  epoetin odalis (EPOGEN) Injectable 46768 Unit(s) IV Push <User Schedule>  ferrous    sulfate Liquid 300 milliGRAM(s) Oral daily  glucagon  Injectable 1 milliGRAM(s) IntraMuscular once  insulin lispro (ADMELOG) corrective regimen sliding scale   SubCutaneous every 6 hours  insulin NPH human recombinant 6 Unit(s) SubCutaneous every 6 hours  levETIRAcetam  Solution 1000 milliGRAM(s) Oral daily  levETIRAcetam  Solution 250 milliGRAM(s) Oral <User Schedule>  midodrine. 30 milliGRAM(s) Oral every 8 hours  pantoprazole  Injectable 40 milliGRAM(s) IV Push two times a day  petrolatum Ophthalmic Ointment 1 Application(s) Both EYES four times a day  sodium chloride 1 Gram(s) Oral two times a day    VITALS:  T(C): , Max: 37.8 (10-17-23 @ 16:00)  T(F): , Max: 100 (10-17-23 @ 16:00)  HR: 113 (10-18-23 @ 10:07)  BP: 111/37 (10-18-23 @ 05:18)  BP(mean): 90 (10-17-23 @ 16:00)  RR: 20 (10-18-23 @ 05:18)  SpO2: 100% (10-18-23 @ 10:07)    PHYSICAL EXAM:  Constitutional: ill appearing, trached to vent  HEENT: trach-vent, (+)NGT  Respiratory: coarse BS b/l  Cardiovascular: tachy reg s1s2  Gastrointestinal: BS+, soft, NT/ND  Extremities: + peripheral edema  Neurological: tone WNL  Skin: No rashes  Vascular Access: shiley    LABS:                        7.5    14.17 )-----------( 467      ( 17 Oct 2023 07:02 )             23.5     10-17    131<L>  |  95<L>  |  36<H>  ----------------------------<  231<H>  4.9   |  24  |  1.73<H>    Ca    8.1<L>      17 Oct 2023 07:02  Phos  3.8     10-17  Mg     2.20     10-17

## 2023-10-19 NOTE — PROGRESS NOTE ADULT - ASSESSMENT
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 While in MICU patient noted with worsening renal failure requiring HD initiation, CGE/ Melena s/p EGD 8/31 found with esophagitis and erosive gastropathy, new right cerebellar infarct and fevers second to ESBL COLI and MSSA VAP, MSSA bacteremia, left ear otitis externa and drug rxn to cephalosporins Course further complicated by prolonged vent time s/p tracheostomy 9/1 and transferred to RCU 9/3 completed by recurrent fevers with high PIP, respiratory acidosis and concern for volume overloaded.   CTH repeated 9/26 for concern for encephalopathy - has known now - chronic bilateral cerebellar infarcts, L PCA infarct. L parietal hypodensity seen on prior CT likely artifactual  Repeat CTH 10/3 - unchanged  EEG 10/5 with L posterocentral/temporal spikes/sharp waves - doubt true focal seizure upon further review of EEG     Impression:   ? Focal seizure - has hx of bilateral R > L tremors   Metabolic encephalopathy related to shock, infection, doubt new stroke  L PCA stroke, ESUS s/p ILR, also with bilateral centrum semiovale watershed infarcts   Multiple embolic strokes on MRI 8/17 - R cerebellum, midbrain, multiple other tiny embolic infarcts.   Dementia   MSSA bacteremia, r/o endocarditis s/p Daptomycin  Acute popliteal DVT on Eliquis   Anemia     Recommendations   [] care per MICU for persistent hypotension  [] has multiple areas of epileptogenic potential on EEG, no clear seizure correlate seen. On Keppra but no improvement in mental status  [] consider MRI brain once stable  [] mgmt of hypotension per piumary team, remains full code  [] Keppra 500mg BID with extra 250mg after HD on HD days - will consider stopping after MRI to see if impacting mental status  [] Abx per ID  [] New CTH from 9/26 and 10/3 without any evidence of acute infarct -> encephalopathy likely related to underlying metabolic derangements.  [] C/w home Atorvastatin 10 mg qhs   [] would interrogate ILR and review tele to see if pAF -. no AF seen  [] continue to address GOC with family     Katty Charles DO  Vascular Neurology  Office [phone]

## 2023-10-19 NOTE — CHART NOTE - NSCHARTNOTEFT_GEN_A_CORE
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     While in MICU patient noted with worsening renal failure requiring HD initiation, CGE/ Melena s/p EGD 8/31 found with esophagitis and erosive gastropathy, new right cerebellar infarct and fevers second to ESBL COLI and MSSA VAP. Attempted Ancef however noted with drug eruption and difficulty to ABX selection. Course complicated by MSSA bacteremia and left ear otitis externa. Course further complicated by prolonged vent time s/p tracheostomy 9/1 and transferred to RCU 9/3     While in RCU, course complicated by recurrent fevers, high PIPs with hypervolemia, mucous plug and tracheomalacia with dynamic collapse, progressive left ear OM, and left eye conjunctivitis vs uveitis. Case discussed at length renal and given good UOP attempted renal recovery, however failed, and upon attemp of reinitiation of HD, R IJ SHILEY failed. Attempted volume removal with Bumex GTT however course complicated by hypotension requiring pressor support and transfer back to MICU 9/26.     While in MICU again, Diuresis dc'ed, pressors weaned off, and stress steroids started. L IJ SHILEY placed (9/30) and HD restarted. Course further complicated by AOCD and  PSA and Proteus VAP started on empiric ABX. Patient ultimately transferred back to RCU 10/1     While in RCU, course complicated by volume overload and grossly resistant organisms in sputum. ABXs with Polymixin with intermediate coverage and Recario with resistance explored. Long discussion noted with ID and Polymixin course continued (10/6 - 10/15). Fevers initially improved however then returned and RPT SCx (10/14) wtith  PSA now sensitive to and started on Aztreonam (10/16 - ). Course further c/b anemia and allergic transfusion reaction s/p PRBC transfusion (10/15). Case discussed with BB and work up sent out. Course further complicated by persistently high PIPs and changed to PC with PIPs down from 50-60 to 40s. Course further complicated by soft BPs and poor tolerance with HD. Attempted to add Droxidopa 100 TID to Midodrine 30 TID, however diastolic hypotension and wide pulse pressure (70s) progressed and hypoxia and hypotension noted with HD session (10/19). Case discussed at length with Renal and plan for IV pressor assisted HD to be attempted. MICU reconsulted and accepted to MICU 10/19.    Vital Signs Last 24 Hrs  T(C): 36.7 (19 Oct 2023 15:25), Max: 37.2 (19 Oct 2023 04:49)  T(F): 98.1 (19 Oct 2023 15:25), Max: 98.9 (19 Oct 2023 04:49)  HR: 123 (19 Oct 2023 15:25) (100 - 123)  BP: 83/28 (19 Oct 2023 15:25) (62/40 - 127/54)  BP(mean): --  RR: 20 (19 Oct 2023 15:25) (18 - 20)  SpO2: 98% (19 Oct 2023 15:25) (91% - 100%)    Parameters below as of 19 Oct 2023 15:25  Patient On (Oxygen Delivery Method): ventilator    O2 Concentration (%): 35  I&O's Summary    18 Oct 2023 07:01  -  19 Oct 2023 07:00  --------------------------------------------------------  IN: 1160 mL / OUT: 0 mL / NET: 1160 mL    19 Oct 2023 07:01  -  19 Oct 2023 17:11  --------------------------------------------------------  IN: 400 mL / OUT: 900 mL / NET: -500 mL        MEDICATIONS  (STANDING):  albuterol    0.083% 2.5 milliGRAM(s) Nebulizer every 6 hours  apixaban 5 milliGRAM(s) Oral every 12 hours  artificial tears (preservative free) Ophthalmic Solution 1 Drop(s) Both EYES every 4 hours  atorvastatin 10 milliGRAM(s) Oral at bedtime  aztreonam  IVPB 2000 milliGRAM(s) IV Intermittent <User Schedule>  chlorhexidine 0.12% Liquid 15 milliLiter(s) Oral Mucosa every 12 hours  chlorhexidine 2% Cloths 1 Application(s) Topical daily  dextrose 5%. 1000 milliLiter(s) (50 mL/Hr) IV Continuous <Continuous>  dextrose 5%. 1000 milliLiter(s) (100 mL/Hr) IV Continuous <Continuous>  dextrose 50% Injectable 12.5 Gram(s) IV Push once  dextrose 50% Injectable 25 Gram(s) IV Push once  dextrose 50% Injectable 25 Gram(s) IV Push once  dextrose 50% Injectable 25 Gram(s) IV Push once  droxidopa 100 milliGRAM(s) Oral three times a day  epoetin odalis (EPOGEN) Injectable 55047 Unit(s) IV Push <User Schedule>  ferrous    sulfate Liquid 300 milliGRAM(s) Oral daily  glucagon  Injectable 1 milliGRAM(s) IntraMuscular once  insulin lispro (ADMELOG) corrective regimen sliding scale   SubCutaneous every 6 hours  insulin NPH human recombinant 6 Unit(s) SubCutaneous every 6 hours  levETIRAcetam  Solution 1000 milliGRAM(s) Oral daily  levETIRAcetam  Solution 250 milliGRAM(s) Oral <User Schedule>  midodrine. 30 milliGRAM(s) Oral every 8 hours  pantoprazole  Injectable 40 milliGRAM(s) IV Push two times a day  petrolatum Ophthalmic Ointment 1 Application(s) Both EYES four times a day  sodium chloride 1 Gram(s) Oral two times a day    MEDICATIONS  (PRN):  acetaminophen   Oral Liquid .. 650 milliGRAM(s) Oral every 6 hours PRN Temp greater or equal to 38C (100.4F), Mild Pain (1 - 3)  dextrose Oral Gel 15 Gram(s) Oral once PRN Blood Glucose LESS THAN 70 milliGRAM(s)/deciliter  diphenhydrAMINE Elixir 50 milliGRAM(s) Oral once PRN Rash and/or Itching  sodium chloride 0.9% Bolus. 250 milliLiter(s) IV Bolus every 5 minutes PRN SBP LESS THAN or EQUAL to 90 mmHg  sodium chloride 0.9% lock flush 10 milliLiter(s) IV Push every 1 hour PRN Pre/post blood products, medications, blood draw, and to maintain line patency      LABS                                            7.8                   Neurophils% (auto):   71.5   (10-19 @ 11:44):    15.27)-----------(499          Lymphocytes% (auto):  5.0                                           25.1                   Eosinphils% (auto):   12.0     Manual%: Neutrophils x    ; Lymphocytes x    ; Eosinophils x    ; Bands%: x    ; Blasts x                                    135    |  99     |  36                  Calcium: 8.6   / iCa: x      (10-19 @ 11:44)    ----------------------------<  143       Magnesium: 2.10                             5.0     |  27     |  1.63             Phosphorous: 4.1            ASSESSMENT & PLAN:   72 YO F with PMHx of IDDM2, HTN, Diabetic Nephropathic CKD, HFpEF, Breast Cancer s/p BL mastectomy, chemotherapy and radiation (2018), Respiratory and Cardiac Arrest (2018), 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 While in MICU patient noted with worsening renal failure requiring HD initiation, CGE/ Melena s/p EGD 8/31 found with esophagitis and erosive gastropathy, new right cerebellar infarct and fevers second to ESBL COLI and MSSA VAP, MSSA bacteremia, left ear otitis externa and drug rxn to cephalosporins. 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 Case discussed at length renal and given good UOP attempted renal recovery, however failed, and upon reinitiation of HD attempt. R IJ SHILEY failed. Attempted volume removal with Bumex GTT however course complicated by hypotension requiring transfer back to MICU 9/26 for pressor support. Diuresis dc'ed, pressors weaned off and stress steroids started. L IJ SHILEY placed (9/30) and HD reinstated. Course further complicated by AOCD and  PSA and Proteus VAP. Patient transferred back to RCU 10/1 with course complicated by grossly resistant organisms in sputum and empirically treated with polymixin. Course further c/b intermittent anemia and allergic transfusion reaction s/p PRBC transfusion (10/15), volume overload and hypotension with poor HD tolerance and high PIPs changed to PC.     NEUROLOGY  # NEW cerebella infarct   - Baseline MS AOX3 with short term memory changes   - MRI (8/17) with NEW right cerebellar infarct and old left PCA/occipital infarcts  - STEPHANIE (8/17) with AV showing two linear mobile echogenic elements attached to valve leaflets consistent with Lambel's excrescence, but no TRINITY thrombus.  - EEG (8/11-8/15) with no seizures   - ILR interrogation (9/7) with SR and PACs falsely recorded as AF.   - Continue on ASA and lipitor for medical management   - Continue on Eliquis given concern for embolic source     # Seizures   - Course complicated by questionable head and body shaking with right arm twitching   - EEG (10/4-10/6) with no seizure however but noted with increase seizure potential in left posterior quadrants.   - Continue on Keppra PPX     # Metabolic vs Uremic Encephalopathy   - Lethargy noted with progression to stupor thought to be second to uremia however no improvement with HD.   - RPT CTH (9/26) with no new infarcts   - Poor mentation likely in setting of toxic encephalopathy in setting of infections however pending MRI BRAIN.    CARDIOVASCULAR  # Septic vs vasoplegic shock   - Recurrent shock state second to infections and recently required return to MICU for pressor support   - Pressors weaned off to stress dose and Midodrine   - Weaned off steroids however Midodrine continued with unstable BPs and poor HD tolerance.   - Midodrine increased to 30 TID (9/29 - ) and Droxidopa 100 TID (10/18 - ) without successful hemodynamic stability or HD tolerance     # Hx of HFpEF with likely ADHF with volume overload.   - ECHO (7/2023) with EF 59 with severe LVH and diastolic dysfunction   - STEPHANIE (8/18) with normal LVRVSF however noted with LA septal bowing into RA.   - ECHO (8/11) with EF 60 with mild LVH, normal LVRVSF, and mild ddfxn.  - Grossly volume overloaded and attempting to remove volume with midodrine and droxidopa assisted HD however poor tolerance.     HEENT   # Left ear OE with acute on chronic left-sided otomastoiditis  - Completed CiproHC (9/5 - 9/16), Bradford (9/1-10/1) and acetic acid and bacitracin.   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   - Completed empiric valtrex 500mg BID (9/29 - 10/8) per ophtho and ID  - Continue preservative free artificial tears 4 times a day in the both eye  - Continue lacrilube ointment twice a day in the both eyes     # Oral Lesions with questionable Zoster vs Trauma?   - Patient with tongue pain and seen with anterior ulcerations consolidating and crusting and posterior ulceration.  - Case discussed with pathology resident and HSV negative and Path (9/6) with normal appearing epithelial cells  - Continue on magic mouth wash for pain    RESPIRATORY  # AHHRF second to volume overload vs PNA vs tracheomalacia   - Hx of CKD and HFpEF presented with SOB and hypercarbia second to volume overload requiring intubation on admission   - Vent weaning attempted however limited requiring tracheostomy (9/1) with IP   - Course complicated by PIPs elevated and found with tracheomalacia aon CT   - Increased PEEP to 8 with improvement in dynamic collapse, but PIP waxes and wanes with volume overloaded and secretions.  - Changed vent to PC 24/35/8/40 with slight improvement in PIP (40s)   - Continue on albuterol and chest PT and attempted HTS but noted with hemoptysis and held   - Continue to attempt volume removal with HD however poor tolerance given BP  - Hold Atrovent given thick secretions   - Hold IPV given high inspiratory pressures      GI  # UGIB   - CGE x 1 episode on 8/24 and melena x 2 episodes on 8/31 likely residual blood.   - EGD (8/31) found with esophagitis and erosive gastropathy  - Continue on PPI BID through late October and then PPI QD     # Dysphagia   - NGT-TF continued   - Pending PEG however needs improvement prior to placement.     RENAL  # Progressive CKD   - Presented volume overload and progressive RUSS on CKD   - Case discussed at length with renal and initiated on HD in ICU   - Remain on HD while in RCU however noted to be volume overloaded and poor access. Attempted Bumex renal recovery and patient noted with good UOP, but BUN/ CRE continued to rise without improvement on diuresis.   - Ultimately resumed on HD TTHS TIW with midodrine and droxidopa however poor tolerance   - Plan for IV pressor support assisted HD, however if unable to tolerate will likely not be a candidate for CRRT or further HD    # Hyponatremia   - Continue on salt tabs 1G  BID and monitor sodium     # Hyperphosphatemia to Hypophosphatemia with HD  - PTH normal and elevated phos likely from renal failure  - Phos binder with Renvela started with improvement however then noted with hypophosphatemia and Renvela stopped.     INFECTIOUS DISEASE  # ESBL ECOLI and MSSA VAP c/b MSSA bacteremia   - RVP/ COVID (8/11) negative   - SCx (8/11) with MSSA s/p cefepime (8/12-8/13) and then ancef (8/14) however noted with drug reaction and then changed to vanco and aztreonam (8/12-8/13) in ICU .   - Course complicated by recurrent fevers and return of MSSA with bacteremia.   - SCx (9/1) with MSSA and ESBL ECOLI   - BAL (9/1) with MSSA   - BCx (9/1) with MSSA and cleared on (9/4)   - ECHO (9/6) unable to rule out endocarditis   - Continue on Vanco (9/4-9/22) however noted with rashes of uncertain etiology and replaced with Bradford (9/4 - 10/2) and DAPTO (9/26-10/2) for  completion.     # Proteus and  PSA VAP/ Trachitis   - Continued fevers and SCx (9/29) with MDR  PSA and Proteus   - Continued on Bradford as above however noted with resistance and changed to Zosyn (10/2 - 10/5) with course complicated by possible drug rxn. Zosyn changed to Aztreonam (10/5 - 10/6) however RPT SCx (10/3) with  PSA however only intermediate coverage to aztreonam and amikacin.  PSA grossly resistant to other antibiotics other than cephalosporins however patient with reactions in the past. Case discussed with ID and ID pharmacist and change to Polymyxin (10/6 - 10/15) however continues to spike fevers likely second to Polymyxin only intermediate coverage and Recarbio resistant .   - Return of fever and RPT Sputum (10/14) wtith  PSA now sensitive on Aztreonam (10/16 - 10/25) x 10 days.   - Overall difficulty with ABX tailoring given allergic rxns and resistant organisms.     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  - HH remained labile requiring xfusion (10/15) however noted with questionable reaction with rash and BB called. Work up being sent.   - Monitor HH     VASCULAR   # LLE POP DVT and R IJ and BRACHIAL DVT   - US BL LE (9/10) with acute left deep vein thrombosis of the left popliteal vein.  - RUE swollen and VA DOPPLER RUE (10/3) with R IJ DVT and R brachial DVT.  - Eliquis held second to intermittent suction trauma hemoptysis and now resumed    # HD access  - L IJ CLAUDIA (9/27 - )   - Planned for IR permacath and will attempt when infectious status improves.     ONC   # Hx of Breast CA   - Patient dx in 2018 and s/p BL mastectomy (radical on right) and chemo and RT.   - Supportive care.     ENDOCRINE  # IDDM2   - Continued on NPH with ISS, however noted with hypoglycemic episodes and NPH dc'ed.    - Finger sticks trending up off NPH.   - Continue on NPH 6U with moderate ISS.   - Adjust as need     SKIN  # Drug Eruption   - Attempted cefepime (8/12) vs ancef (8/13-8/14) and then noted with drug rxn in ICU. Continue on steroids with prednisone 40 (8/18 - 9/2) then prednisone 30 (9/3-9/7), then prednisone 20 (9/8 - 9/10), then prednisone 10mg (9/11-9/13) then turn off.   - Attempted Zosyn (10/2-10/5) with possible rash. Zosyn turned off with improvement.   - Hold further cephalosporins or PCNs at this time   - Monitor for further drug rxns.     ETHICS/ GOC    - Attempted palliative discussion however family not interested and wishes for FULL CODE  - Reattempted multiple discussions and family refusing to speak further on GOC    DISPO - Transfer to MICU 12    FOR FOLLOW UP:  [ ] MRI BRAIN   [ ] Monitor PIP on vent (baseline currently on PC 40s of which is improved from 50-60s while on AC)   [ ] Monitor BP closely and IV pressor support per MICU team.   [ ] Attempt iHD sessions with IV pressor support.   [ ] Complete Aztreonam 10 day course (currently day 4/10)     Alexey Candelario PA-C  Department of Medicine/ [facility]  In house [facility] [id number]  In [place] [facility] [place] [id number]  Reachable via teams

## 2023-10-19 NOTE — PROGRESS NOTE ADULT - NS ATTEND OPT1 GEN_ALL_CORE

## 2023-10-19 NOTE — PROGRESS NOTE ADULT - NS ATTEND AMEND GEN_ALL_CORE FT
agree with above  unable to tolerate HD due to hypotension despite 2 oral pressors  d/w renal, per family wishes, plan to consult micu for trial with iv pressor support  on aztreonam, afebrile, neg blood. ID f/u appreciated  pt with a very poor prognosis and family without realistic expectations despite multiple discussions by many disciplines

## 2023-10-19 NOTE — PROGRESS NOTE ADULT - SUBJECTIVE AND OBJECTIVE BOX
Subjective: Patient seen and examined. No new events except as noted.     REVIEW OF SYSTEMS:    Unable to obtain     MEDICATIONS:  MEDICATIONS  (STANDING):  albuterol    0.083% 2.5 milliGRAM(s) Nebulizer every 6 hours  apixaban 5 milliGRAM(s) Oral every 12 hours  artificial tears (preservative free) Ophthalmic Solution 1 Drop(s) Both EYES every 4 hours  atorvastatin 10 milliGRAM(s) Oral at bedtime  aztreonam  IVPB 2000 milliGRAM(s) IV Intermittent <User Schedule>  chlorhexidine 0.12% Liquid 15 milliLiter(s) Oral Mucosa every 12 hours  chlorhexidine 2% Cloths 1 Application(s) Topical daily  dextrose 5%. 1000 milliLiter(s) (50 mL/Hr) IV Continuous <Continuous>  dextrose 5%. 1000 milliLiter(s) (100 mL/Hr) IV Continuous <Continuous>  dextrose 50% Injectable 12.5 Gram(s) IV Push once  dextrose 50% Injectable 25 Gram(s) IV Push once  dextrose 50% Injectable 25 Gram(s) IV Push once  dextrose 50% Injectable 25 Gram(s) IV Push once  droxidopa 100 milliGRAM(s) Oral three times a day  epoetin odalis (EPOGEN) Injectable 71709 Unit(s) IV Push <User Schedule>  ferrous    sulfate Liquid 300 milliGRAM(s) Oral daily  glucagon  Injectable 1 milliGRAM(s) IntraMuscular once  insulin lispro (ADMELOG) corrective regimen sliding scale   SubCutaneous every 6 hours  insulin NPH human recombinant 6 Unit(s) SubCutaneous every 6 hours  levETIRAcetam  Solution 1000 milliGRAM(s) Oral daily  levETIRAcetam  Solution 250 milliGRAM(s) Oral <User Schedule>  midodrine. 30 milliGRAM(s) Oral every 8 hours  pantoprazole  Injectable 40 milliGRAM(s) IV Push two times a day  petrolatum Ophthalmic Ointment 1 Application(s) Both EYES four times a day  sodium chloride 1 Gram(s) Oral two times a day      PHYSICAL EXAM:  T(C): 37.2 (10-19-23 @ 04:49), Max: 37.2 (10-18-23 @ 11:37)  HR: 109 (10-19-23 @ 08:31) (104 - 125)  BP: 116/37 (10-19-23 @ 04:49) (103/39 - 118/42)  RR: 20 (10-19-23 @ 04:49) (18 - 20)  SpO2: 99% (10-19-23 @ 08:31) (99% - 100%)  Wt(kg): --  I&O's Summary    18 Oct 2023 07:01  -  19 Oct 2023 07:00  --------------------------------------------------------  IN: 1160 mL / OUT: 0 mL / NET: 1160 mL          Appearance: NAD, +trach  HEENT: dry oral mucosa  Lymphatic: No lymphadenopathy  Cardiovascular: Normal S1 S2, No JVD, No murmurs, No edema  Respiratory: Decreased BS, + trach to vent	  Neuro: opens eyes  Gastrointestinal: Soft, Non-tender, + BS, + NGT  Skin: No rashes, No ecchymoses, No cyanosis	  Extremities: No strength/ROM 2/2 sedation, + BL LE edema  Vascular: Peripheral pulses palpable 2+ bilaterally    Mode: AC/ CMV (Assist Control/ Continuous Mandatory Ventilation), RR (machine): 24, FiO2: 35, PEEP: 8, ITime: 0.8, MAP: 19, PC: 35, PIP: 44      LABS:    CARDIAC MARKERS:                  proBNP:   Lipid Profile:   HgA1c:   TSH:             TELEMETRY: 	    ECG:  	  RADIOLOGY:   DIAGNOSTIC TESTING:  [ ] Echocardiogram:  [ ]  Catheterization:  [ ] Stress Test:    OTHER:

## 2023-10-19 NOTE — PROGRESS NOTE ADULT - SUBJECTIVE AND OBJECTIVE BOX
Follow Up:  MSSA bacteremia, otitis external, VAP, fever    Interval History: no more fever, still with the erythematous rash    ROS:  unable to obtain because: trached, AMS        Allergies  isoniazid (Rash)  nafcillin (Unknown)  hydrALAZINE (Rash)  vitamin E (Short breath; Urticaria; Hives)  doxycycline (Rash)  cefepime (Rash)  NIFEdipine (Urticaria; Hives)  Zosyn (Rash)        ANTIMICROBIALS:  aztreonam  IVPB 2000 <User Schedule>      OTHER MEDS:  acetaminophen   Oral Liquid .. 650 milliGRAM(s) Oral every 6 hours PRN  albuterol    0.083% 2.5 milliGRAM(s) Nebulizer every 6 hours  apixaban 5 milliGRAM(s) Oral every 12 hours  artificial tears (preservative free) Ophthalmic Solution 1 Drop(s) Both EYES every 4 hours  atorvastatin 10 milliGRAM(s) Oral at bedtime  chlorhexidine 0.12% Liquid 15 milliLiter(s) Oral Mucosa every 12 hours  chlorhexidine 2% Cloths 1 Application(s) Topical daily  dextrose 5%. 1000 milliLiter(s) IV Continuous <Continuous>  dextrose 5%. 1000 milliLiter(s) IV Continuous <Continuous>  dextrose 50% Injectable 12.5 Gram(s) IV Push once  dextrose 50% Injectable 25 Gram(s) IV Push once  dextrose 50% Injectable 25 Gram(s) IV Push once  dextrose 50% Injectable 25 Gram(s) IV Push once  dextrose Oral Gel 15 Gram(s) Oral once PRN  diphenhydrAMINE Elixir 50 milliGRAM(s) Oral once PRN  droxidopa 100 milliGRAM(s) Oral three times a day  epoetin odalis (EPOGEN) Injectable 39228 Unit(s) IV Push <User Schedule>  ferrous    sulfate Liquid 300 milliGRAM(s) Oral daily  glucagon  Injectable 1 milliGRAM(s) IntraMuscular once  insulin lispro (ADMELOG) corrective regimen sliding scale   SubCutaneous every 6 hours  insulin NPH human recombinant 6 Unit(s) SubCutaneous every 6 hours  levETIRAcetam  Solution 1000 milliGRAM(s) Oral daily  levETIRAcetam  Solution 250 milliGRAM(s) Oral <User Schedule>  midodrine. 30 milliGRAM(s) Oral every 8 hours  pantoprazole  Injectable 40 milliGRAM(s) IV Push two times a day  petrolatum Ophthalmic Ointment 1 Application(s) Both EYES four times a day  sodium chloride 1 Gram(s) Oral two times a day  sodium chloride 0.9% Bolus. 250 milliLiter(s) IV Bolus every 5 minutes PRN  sodium chloride 0.9% lock flush 10 milliLiter(s) IV Push every 1 hour PRN      Vital Signs Last 24 Hrs  T(C): 37.2 (19 Oct 2023 04:49), Max: 37.2 (19 Oct 2023 04:49)  T(F): 98.9 (19 Oct 2023 04:49), Max: 98.9 (19 Oct 2023 04:49)  HR: 112 (19 Oct 2023 11:01) (104 - 123)  BP: 116/37 (19 Oct 2023 04:49) (103/39 - 118/42)  BP(mean): --  RR: 20 (19 Oct 2023 04:49) (18 - 20)  SpO2: 94% (19 Oct 2023 11:01) (94% - 100%)    Parameters below as of 19 Oct 2023 08:31  Patient On (Oxygen Delivery Method): ventilator        Physical Exam:  General:    trached   Respiratory:   trach on vent  abd:  distended but soft  :  no  willard  Musculoskeletal : generalized edema  Skin: erythematous rash on chest, UE, LEs , face also erythematous  vascular: TRISHA odom                     MICROBIOLOGY:  v  .Blood Blood-Venous  10-17-23   No growth at 24 hours  --  --      .Blood Blood-Peripheral  10-14-23   No growth at 4 days  --  --      Trach Asp Tracheal Aspirate  10-14-23   Numerous Pseudomonas aeruginosa (Carbapenem Resistant)  Numerous Proteus mirabilis  Normal Respiratory Cate present  --  Pseudomonas aeruginosa (Carbapenem Resistant)  Proteus mirabilis      .Blood Blood-Venous  10-09-23   No growth at 5 days  --  --      Trach Asp Tracheal Aspirate  10-03-23   Numerous Pseudomonas aeruginosa (Carbapenem Resistant) Susceptibility to  follow.  Normal Respiratory Cate absent  --  Pseudomonas aeruginosa (Carbapenem Resistant)      .Blood Blood-Venous  09-29-23   No growth at 5 days  --  --      .Blood Blood-Peripheral  09-29-23   No growth at 5 days  --  --      ET Tube ET Tube  09-29-23   Rare Yeast  --  --      ET Tube ET Tube  09-29-23   Numerous Proteus mirabilis  Moderate Pseudomonas aeruginosa (Carbapenem Resistant)  Normal Respiratory Cate absent  --  Proteus mirabilis  Pseudomonas aeruginosa (Carbapenem Resistant)      Clean Catch Clean Catch (Midstream)  09-26-23   10,000 - 49,000 CFU/mL Candida albicans "Susceptibilities not performed"  --  --      .Blood Blood-Peripheral  09-26-23   No growth at 5 days  --  --      .Blood Blood-Venous  09-20-23   No growth at 5 days  --  --      .Blood Blood-Peripheral  09-20-23   No growth at 5 days  --  --          Rapid RVP Result: NotDete (10-14 @ 05:10)        RADIOLOGY:  Images independently visualized and reviewed personally, findings as below  < from: Xray Chest 1 View AP/PA (10.16.23 @ 14:24) >  IMPRESSION:  Enteric tube with tip in the stomach.    Bibasilar patchy opacities.    Bilateral layering effusions, with right increased in latest image.      < end of copied text >  < from: TTE W or WO Ultrasound Enhancing Agent (10.01.23 @ 10:07) >     CONCLUSIONS:      1. Left ventricular systolic function is normal with an ejection fraction visually estimated at 60 to 65 %.   2. The right ventricle is not well visualized.   3. Although there is no evidence of endocarditis on this study, the valves are not visualized well. Suggest STEPHANIE if clinically appropriate.      < end of copied text >

## 2023-10-19 NOTE — PROGRESS NOTE ADULT - SUBJECTIVE AND OBJECTIVE BOX
NEPHROLOGY-[facility] [phone]        Patient seen and examined seen on HD, hypotensive SBP 80's. Midodrine and droxidropa given prior to HD.         MEDICATIONS  (STANDING):  albuterol    0.083% 2.5 milliGRAM(s) Nebulizer every 6 hours  apixaban 5 milliGRAM(s) Oral every 12 hours  artificial tears (preservative free) Ophthalmic Solution 1 Drop(s) Both EYES every 4 hours  atorvastatin 10 milliGRAM(s) Oral at bedtime  aztreonam  IVPB 2000 milliGRAM(s) IV Intermittent <User Schedule>  chlorhexidine 0.12% Liquid 15 milliLiter(s) Oral Mucosa every 12 hours  chlorhexidine 2% Cloths 1 Application(s) Topical daily  dextrose 5%. 1000 milliLiter(s) (100 mL/Hr) IV Continuous <Continuous>  dextrose 5%. 1000 milliLiter(s) (50 mL/Hr) IV Continuous <Continuous>  dextrose 50% Injectable 12.5 Gram(s) IV Push once  dextrose 50% Injectable 25 Gram(s) IV Push once  dextrose 50% Injectable 25 Gram(s) IV Push once  dextrose 50% Injectable 25 Gram(s) IV Push once  droxidopa 100 milliGRAM(s) Oral three times a day  epoetin odalis (EPOGEN) Injectable 80494 Unit(s) IV Push <User Schedule>  ferrous    sulfate Liquid 300 milliGRAM(s) Oral daily  glucagon  Injectable 1 milliGRAM(s) IntraMuscular once  insulin lispro (ADMELOG) corrective regimen sliding scale   SubCutaneous every 6 hours  insulin NPH human recombinant 6 Unit(s) SubCutaneous every 6 hours  levETIRAcetam  Solution 1000 milliGRAM(s) Oral daily  levETIRAcetam  Solution 250 milliGRAM(s) Oral <User Schedule>  midodrine. 30 milliGRAM(s) Oral every 8 hours  pantoprazole  Injectable 40 milliGRAM(s) IV Push two times a day  petrolatum Ophthalmic Ointment 1 Application(s) Both EYES four times a day  sodium chloride 1 Gram(s) Oral two times a day      VITAL:  T(C): , Max: 37.2 (10-19-23 @ 04:49)  T(F): , Max: 98.9 (10-19-23 @ 04:49)  HR: 112 (10-19-23 @ 11:01)  BP: 97/38 (10-19-23 @ 10:58)  BP(mean): --  RR: 20 (10-19-23 @ 10:58)  SpO2: 94% (10-19-23 @ 11:01)  Wt(kg): --    I and O's:    10-18 @ 07:01  -  10-19 @ 07:00  --------------------------------------------------------  IN: 1160 mL / OUT: 0 mL / NET: 1160 mL          PHYSICAL EXAM:    Constitutional: trach to vent   HEENT: + NGT + tracheostomy   Respiratory: coarse BS  Cardiovascular: S1 and S2  Gastrointestinal: BS+, soft, NT/ND  Extremities: +  peripheral edema  Neurological: UTO  : No Wheeler  Skin: No rashes  Access: ilene ([facility])    LABS:                        7.8    15.27 )-----------( 499      ( 19 Oct 2023 11:44 )             25.1     10-19    135  |  99  |  36<H>  ----------------------------<  143<H>  5.0   |  27  |  1.63<H>    Ca    8.6      19 Oct 2023 11:44  Phos  4.1     10-19  Mg     2.10     10-19            Urine Studies:  Urinalysis Basic - ( 19 Oct 2023 11:44 )    Color: x / Appearance: x / SG: x / pH: x  Gluc: 143 mg/dL / Ketone: x  / Bili: x / Urobili: x   Blood: x / Protein: x / Nitrite: x   Leuk Esterase: x / RBC: x / WBC x   Sq Epi: x / Non Sq Epi: x / Bacteria: x            RADIOLOGY & ADDITIONAL STUDIES:  < from: Xray Chest 1 View AP/PA (10.16.23 @ 14:24) >    IMPRESSION:  Enteric tube with tip in the stomach.    Bibasilar patchy opacities.    Bilateral layering effusions, with right increased in latest image.    --- End of Report ---      < end of copied text >            Assessment and Plan:   · Assessment	    IMPRESSION: 73F w/ HTN, DM2, CVA, breast CA-bilateral mastectomy, recurrent aspiration pneumonia/respiratory failure, and CKD, 8/11/23 p/w acute hypercapnic respiratory failure; c/b RUSS    (1)Renal - RUSS on CKD4 ==>now newly ESRD. s/p short HD Monday due to poor catheter flow s/p cathflo, if the catheter continues to function poorly, at some point we will have to plan to exchange it    (2)Hyponatremia - improved/stable as of late with HD    (3)CV- tenuous hemodynamics such with each passing week we have more difficulty performing HD/achieving UF, persistent issue.    (4)ID- BCx from 10/14/23 NGTD, BC (10/17) NGTD on IV abx     (5)Pulm- vent-dependent    (6)Aneima- hgb declining, epo with HD, defer PRBC today per RCU    RECOMMEND:  (1)HD now: 3hrs, 1.5kg UF as able, pressors and sodium modelling as needed to keep SBP>90; Epogen with HD   (2)May need to initiate IV vasopressor support for next HD to assist with fluid removal   (3)Dose new meds for GFR <10/HD.     D/w Alexey DUNBAR and  at bedside     Tere Arauz NP  [facility]  [phone]          NEPHROLOGY-[facility] [phone]        Patient seen and examined seen on HD, hypotensive SBP 80's. Midodrine and droxidopa given prior to HD.         MEDICATIONS  (STANDING):  albuterol    0.083% 2.5 milliGRAM(s) Nebulizer every 6 hours  apixaban 5 milliGRAM(s) Oral every 12 hours  artificial tears (preservative free) Ophthalmic Solution 1 Drop(s) Both EYES every 4 hours  atorvastatin 10 milliGRAM(s) Oral at bedtime  aztreonam  IVPB 2000 milliGRAM(s) IV Intermittent <User Schedule>  chlorhexidine 0.12% Liquid 15 milliLiter(s) Oral Mucosa every 12 hours  chlorhexidine 2% Cloths 1 Application(s) Topical daily  dextrose 5%. 1000 milliLiter(s) (100 mL/Hr) IV Continuous <Continuous>  dextrose 5%. 1000 milliLiter(s) (50 mL/Hr) IV Continuous <Continuous>  dextrose 50% Injectable 12.5 Gram(s) IV Push once  dextrose 50% Injectable 25 Gram(s) IV Push once  dextrose 50% Injectable 25 Gram(s) IV Push once  dextrose 50% Injectable 25 Gram(s) IV Push once  droxidopa 100 milliGRAM(s) Oral three times a day  epoetin odalis (EPOGEN) Injectable 83290 Unit(s) IV Push <User Schedule>  ferrous    sulfate Liquid 300 milliGRAM(s) Oral daily  glucagon  Injectable 1 milliGRAM(s) IntraMuscular once  insulin lispro (ADMELOG) corrective regimen sliding scale   SubCutaneous every 6 hours  insulin NPH human recombinant 6 Unit(s) SubCutaneous every 6 hours  levETIRAcetam  Solution 1000 milliGRAM(s) Oral daily  levETIRAcetam  Solution 250 milliGRAM(s) Oral <User Schedule>  midodrine. 30 milliGRAM(s) Oral every 8 hours  pantoprazole  Injectable 40 milliGRAM(s) IV Push two times a day  petrolatum Ophthalmic Ointment 1 Application(s) Both EYES four times a day  sodium chloride 1 Gram(s) Oral two times a day      VITAL:  T(C): , Max: 37.2 (10-19-23 @ 04:49)  T(F): , Max: 98.9 (10-19-23 @ 04:49)  HR: 112 (10-19-23 @ 11:01)  BP: 97/38 (10-19-23 @ 10:58)  BP(mean): --  RR: 20 (10-19-23 @ 10:58)  SpO2: 94% (10-19-23 @ 11:01)  Wt(kg): --    I and O's:    10-18 @ 07:01  -  10-19 @ 07:00  --------------------------------------------------------  IN: 1160 mL / OUT: 0 mL / NET: 1160 mL          PHYSICAL EXAM:    Constitutional: trach to vent   HEENT: + NGT + tracheostomy   Respiratory: coarse BS  Cardiovascular: S1 and S2  Gastrointestinal: BS+, soft, NT/ND  Extremities: +  peripheral edema  Neurological: UTO  : No Wheelre  Skin: No rashes  Access: ilene ([facility])    LABS:                        7.8    15.27 )-----------( 499      ( 19 Oct 2023 11:44 )             25.1     10-19    135  |  99  |  36<H>  ----------------------------<  143<H>  5.0   |  27  |  1.63<H>    Ca    8.6      19 Oct 2023 11:44  Phos  4.1     10-19  Mg     2.10     10-19            Urine Studies:  Urinalysis Basic - ( 19 Oct 2023 11:44 )    Color: x / Appearance: x / SG: x / pH: x  Gluc: 143 mg/dL / Ketone: x  / Bili: x / Urobili: x   Blood: x / Protein: x / Nitrite: x   Leuk Esterase: x / RBC: x / WBC x   Sq Epi: x / Non Sq Epi: x / Bacteria: x            RADIOLOGY & ADDITIONAL STUDIES:  < from: Xray Chest 1 View AP/PA (10.16.23 @ 14:24) >    IMPRESSION:  Enteric tube with tip in the stomach.    Bibasilar patchy opacities.    Bilateral layering effusions, with right increased in latest image.    --- End of Report ---      < end of copied text >            Assessment and Plan:   · Assessment	    IMPRESSION: 73F w/ HTN, DM2, CVA, breast CA-bilateral mastectomy, recurrent aspiration pneumonia/respiratory failure, and CKD, 8/11/23 p/w acute hypercapnic respiratory failure; c/b URSS    (1)Renal - RUSS on CKD4 ==>now newly ESRD. s/p short HD Monday due to poor catheter flow s/p cathflo, if the catheter continues to function poorly, at some point we will have to plan to exchange it    (2)Hyponatremia - improved/stable as of late with HD    (3)CV- tenuous hemodynamics such with each passing week we have more difficulty performing HD/achieving UF, persistent issue.    (4)ID- BCx from 10/14/23 NGTD, BC (10/17) NGTD on IV abx     (5)Pulm- vent-dependent    (6)Anemia- hgb declining, epo with HD, defer PRBC today per RCU    RECOMMEND:  (1)HD now: 3hrs, 1.5kg UF as able, pressors and sodium modelling as needed to keep SBP>90; Epogen with HD   (2)May need to initiate IV vasopressor support for next HD to assist with fluid removal   (3)Dose new meds for GFR <10/HD.     D/w Alexey DUNBAR and  at bedside     Tere Arauz NP  [facility]  [phone]          NEPHROLOGY-[facility] [phone]        Patient seen and examined seen on HD, hypotensive SBP 80's. Midodrine and droxidopa given prior to HD.         MEDICATIONS  (STANDING):  albuterol    0.083% 2.5 milliGRAM(s) Nebulizer every 6 hours  apixaban 5 milliGRAM(s) Oral every 12 hours  artificial tears (preservative free) Ophthalmic Solution 1 Drop(s) Both EYES every 4 hours  atorvastatin 10 milliGRAM(s) Oral at bedtime  aztreonam  IVPB 2000 milliGRAM(s) IV Intermittent <User Schedule>  chlorhexidine 0.12% Liquid 15 milliLiter(s) Oral Mucosa every 12 hours  chlorhexidine 2% Cloths 1 Application(s) Topical daily  dextrose 5%. 1000 milliLiter(s) (100 mL/Hr) IV Continuous <Continuous>  dextrose 5%. 1000 milliLiter(s) (50 mL/Hr) IV Continuous <Continuous>  dextrose 50% Injectable 12.5 Gram(s) IV Push once  dextrose 50% Injectable 25 Gram(s) IV Push once  dextrose 50% Injectable 25 Gram(s) IV Push once  dextrose 50% Injectable 25 Gram(s) IV Push once  droxidopa 100 milliGRAM(s) Oral three times a day  epoetin odalis (EPOGEN) Injectable 34700 Unit(s) IV Push <User Schedule>  ferrous    sulfate Liquid 300 milliGRAM(s) Oral daily  glucagon  Injectable 1 milliGRAM(s) IntraMuscular once  insulin lispro (ADMELOG) corrective regimen sliding scale   SubCutaneous every 6 hours  insulin NPH human recombinant 6 Unit(s) SubCutaneous every 6 hours  levETIRAcetam  Solution 1000 milliGRAM(s) Oral daily  levETIRAcetam  Solution 250 milliGRAM(s) Oral <User Schedule>  midodrine. 30 milliGRAM(s) Oral every 8 hours  pantoprazole  Injectable 40 milliGRAM(s) IV Push two times a day  petrolatum Ophthalmic Ointment 1 Application(s) Both EYES four times a day  sodium chloride 1 Gram(s) Oral two times a day      VITAL:  T(C): , Max: 37.2 (10-19-23 @ 04:49)  T(F): , Max: 98.9 (10-19-23 @ 04:49)  HR: 112 (10-19-23 @ 11:01)  BP: 97/38 (10-19-23 @ 10:58)  BP(mean): --  RR: 20 (10-19-23 @ 10:58)  SpO2: 94% (10-19-23 @ 11:01)  Wt(kg): --    I and O's:    10-18 @ 07:01  -  10-19 @ 07:00  --------------------------------------------------------  IN: 1160 mL / OUT: 0 mL / NET: 1160 mL          PHYSICAL EXAM:    Constitutional: trach to vent   HEENT: + NGT + tracheostomy   Respiratory: coarse BS  Cardiovascular: S1 and S2  Gastrointestinal: BS+, soft, NT/ND  Extremities: +  peripheral edema  Neurological: UTO  : No Wheeler  Skin: No rashes  Access: ilene ([facility])    LABS:                        7.8    15.27 )-----------( 499      ( 19 Oct 2023 11:44 )             25.1     10-19    135  |  99  |  36<H>  ----------------------------<  143<H>  5.0   |  27  |  1.63<H>    Ca    8.6      19 Oct 2023 11:44  Phos  4.1     10-19  Mg     2.10     10-19            Urine Studies:  Urinalysis Basic - ( 19 Oct 2023 11:44 )    Color: x / Appearance: x / SG: x / pH: x  Gluc: 143 mg/dL / Ketone: x  / Bili: x / Urobili: x   Blood: x / Protein: x / Nitrite: x   Leuk Esterase: x / RBC: x / WBC x   Sq Epi: x / Non Sq Epi: x / Bacteria: x            RADIOLOGY & ADDITIONAL STUDIES:  < from: Xray Chest 1 View AP/PA (10.16.23 @ 14:24) >    IMPRESSION:  Enteric tube with tip in the stomach.    Bibasilar patchy opacities.    Bilateral layering effusions, with right increased in latest image.    --- End of Report ---      < end of copied text >            Assessment and Plan:   · Assessment	    IMPRESSION: 73F w/ HTN, DM2, CVA, breast CA-bilateral mastectomy, recurrent aspiration pneumonia/respiratory failure, and CKD, 8/11/23 p/w acute hypercapnic respiratory failure; c/b RUSS    (1)Renal - RUSS on CKD4 ==>now newly ESRD. s/p short HD Monday due to poor catheter flow s/p cathflo, if the catheter continues to function poorly, at some point we will have to plan to exchange it    (2)Hyponatremia - improved/stable as of late with HD    (3)CV- tenuous hemodynamics such with each passing week we have more difficulty performing HD/achieving UF, persistent issue.    (4)ID- BCx from 10/14/23 NGTD, BC (10/17) NGTD on IV abx     (5)Pulm- vent-dependent    (6)Anemia- hgb declining, epo with HD, defer PRBC today per RCU    RECOMMEND:  (1)HD now: 3hrs, 1.5kg UF as able, pressors and sodium modelling as needed to keep SBP>90; Epogen with HD   (2)May need to initiate IV vasopressor support for next HD to assist with fluid removal   (3)Dose new meds for GFR <10/HD.     D/w Alexey DUNBAR and  at bedside     Tere Arauz NP  [facility]  [phone]     RENAL ATTENDING NOTE  Patient seen and examined with NP. Agree with assessment and plan as above.  I discussed as well with Alexey DUNBAR. MICU evaluating at bedside at present.     Jimmy Colon MD  [facility]  [phone]

## 2023-10-19 NOTE — PROGRESS NOTE ADULT - SUBJECTIVE AND OBJECTIVE BOX
CHIEF COMPLAINT: Patient is a 73y old  Female who presents with a chief complaint of Respiratory distress (19 Oct 2023 13:20)      INTERVAL EVENTS:    REVIEW OF SYSTEMS: Seen by bedside during AM rounds and     Mode: AC/ CMV (Assist Control/ Continuous Mandatory Ventilation), RR (machine): 24, FiO2: 35, PEEP: 8, ITime: 0.8, MAP: 19, PC: 32, PIP: 40      OBJECTIVE:  ICU Vital Signs Last 24 Hrs  T(C): 37.2 (19 Oct 2023 13:46), Max: 37.2 (19 Oct 2023 04:49)  T(F): 98.9 (19 Oct 2023 13:46), Max: 98.9 (19 Oct 2023 04:49)  HR: 116 (19 Oct 2023 14:47) (100 - 123)  BP: 127/54 (19 Oct 2023 13:46) (97/38 - 127/54)  BP(mean): --  ABP: --  ABP(mean): --  RR: 20 (19 Oct 2023 13:46) (18 - 20)  SpO2: 99% (19 Oct 2023 14:47) (94% - 100%)    O2 Parameters below as of 19 Oct 2023 14:47  Patient On (Oxygen Delivery Method): ventilator          Mode: AC/ CMV (Assist Control/ Continuous Mandatory Ventilation), RR (machine): 24, FiO2: 35, PEEP: 8, ITime: 0.8, MAP: 19, PC: 32, PIP: 40    10-18 @ 07:01  -  10-19 @ 07:00  --------------------------------------------------------  IN: 1160 mL / OUT: 0 mL / NET: 1160 mL    10-19 @ 07:01  -  10-19 @ 14:56  --------------------------------------------------------  IN: 400 mL / OUT: 900 mL / NET: -500 mL      CAPILLARY BLOOD GLUCOSE      POCT Blood Glucose.: 140 mg/dL (19 Oct 2023 11:16)      HOSPITAL MEDICATIONS:  MEDICATIONS  (STANDING):  albuterol    0.083% 2.5 milliGRAM(s) Nebulizer every 6 hours  apixaban 5 milliGRAM(s) Oral every 12 hours  artificial tears (preservative free) Ophthalmic Solution 1 Drop(s) Both EYES every 4 hours  atorvastatin 10 milliGRAM(s) Oral at bedtime  aztreonam  IVPB 2000 milliGRAM(s) IV Intermittent <User Schedule>  chlorhexidine 0.12% Liquid 15 milliLiter(s) Oral Mucosa every 12 hours  chlorhexidine 2% Cloths 1 Application(s) Topical daily  dextrose 5%. 1000 milliLiter(s) (100 mL/Hr) IV Continuous <Continuous>  dextrose 5%. 1000 milliLiter(s) (50 mL/Hr) IV Continuous <Continuous>  dextrose 50% Injectable 12.5 Gram(s) IV Push once  dextrose 50% Injectable 25 Gram(s) IV Push once  dextrose 50% Injectable 25 Gram(s) IV Push once  dextrose 50% Injectable 25 Gram(s) IV Push once  droxidopa 100 milliGRAM(s) Oral three times a day  epoetin odalis (EPOGEN) Injectable 89795 Unit(s) IV Push <User Schedule>  ferrous    sulfate Liquid 300 milliGRAM(s) Oral daily  glucagon  Injectable 1 milliGRAM(s) IntraMuscular once  insulin lispro (ADMELOG) corrective regimen sliding scale   SubCutaneous every 6 hours  insulin NPH human recombinant 6 Unit(s) SubCutaneous every 6 hours  levETIRAcetam  Solution 1000 milliGRAM(s) Oral daily  levETIRAcetam  Solution 250 milliGRAM(s) Oral <User Schedule>  midodrine. 30 milliGRAM(s) Oral every 8 hours  pantoprazole  Injectable 40 milliGRAM(s) IV Push two times a day  petrolatum Ophthalmic Ointment 1 Application(s) Both EYES four times a day  sodium chloride 1 Gram(s) Oral two times a day    MEDICATIONS  (PRN):  acetaminophen   Oral Liquid .. 650 milliGRAM(s) Oral every 6 hours PRN Temp greater or equal to 38C (100.4F), Mild Pain (1 - 3)  dextrose Oral Gel 15 Gram(s) Oral once PRN Blood Glucose LESS THAN 70 milliGRAM(s)/deciliter  diphenhydrAMINE Elixir 50 milliGRAM(s) Oral once PRN Rash and/or Itching  sodium chloride 0.9% Bolus. 250 milliLiter(s) IV Bolus every 5 minutes PRN SBP LESS THAN or EQUAL to 90 mmHg  sodium chloride 0.9% lock flush 10 milliLiter(s) IV Push every 1 hour PRN Pre/post blood products, medications, blood draw, and to maintain line patency      PHYSICAL EXAMINATION    LABS:                        7.8    15.27 )-----------( 499      ( 19 Oct 2023 11:44 )             25.1     10-19    135  |  99  |  36<H>  ----------------------------<  143<H>  5.0   |  27  |  1.63<H>    Ca    8.6      19 Oct 2023 11:44  Phos  4.1     10-19  Mg     2.10     10-19        Urinalysis Basic - ( 19 Oct 2023 11:44 )    Color: x / Appearance: x / SG: x / pH: x  Gluc: 143 mg/dL / Ketone: x  / Bili: x / Urobili: x   Blood: x / Protein: x / Nitrite: x   Leuk Esterase: x / RBC: x / WBC x   Sq Epi: x / Non Sq Epi: x / Bacteria: x            PAST MEDICAL & SURGICAL HISTORY:  Breast CA      Diabetes      Stroke      Cardiac arrest      HTN (hypertension)      H/O mastectomy, bilateral          FAMILY HISTORY:  No pertinent family history in first degree relatives        Social History:      RADIOLOGY:  [ ] Reviewed and interpreted by me    PULMONARY FUNCTION TESTS:    EKG: CHIEF COMPLAINT: Patient is a 73y old  Female who presents with a chief complaint of Respiratory distress (19 Oct 2023 13:20)      INTERVAL EVENTS:  - No interval events overnight   - Poor tolerance with HD despite Midodrine and Droxidopa   - Plan for IV pressor assisted HD and transferred to MICU    REVIEW OF SYSTEMS: Seen by bedside during AM rounds and unable to assess ROS second to vented     Mode: AC/ CMV (Assist Control/ Continuous Mandatory Ventilation), RR (machine): 24, FiO2: 35, PEEP: 8, ITime: 0.8, MAP: 19, PC: 32, PIP: 40      OBJECTIVE:  ICU Vital Signs Last 24 Hrs  T(C): 37.2 (19 Oct 2023 13:46), Max: 37.2 (19 Oct 2023 04:49)  T(F): 98.9 (19 Oct 2023 13:46), Max: 98.9 (19 Oct 2023 04:49)  HR: 116 (19 Oct 2023 14:47) (100 - 123)  BP: 127/54 (19 Oct 2023 13:46) (97/38 - 127/54)  BP(mean): --  ABP: --  ABP(mean): --  RR: 20 (19 Oct 2023 13:46) (18 - 20)  SpO2: 99% (19 Oct 2023 14:47) (94% - 100%)    O2 Parameters below as of 19 Oct 2023 14:47  Patient On (Oxygen Delivery Method): ventilator          Mode: AC/ CMV (Assist Control/ Continuous Mandatory Ventilation), RR (machine): 24, FiO2: 35, PEEP: 8, ITime: 0.8, MAP: 19, PC: 32, PIP: 40    10-18 @ 07:01  -  10-19 @ 07:00  --------------------------------------------------------  IN: 1160 mL / OUT: 0 mL / NET: 1160 mL    10-19 @ 07:01  -  10-19 @ 14:56  --------------------------------------------------------  IN: 400 mL / OUT: 900 mL / NET: -500 mL      CAPILLARY BLOOD GLUCOSE  POCT Blood Glucose.: 140 mg/dL (19 Oct 2023 11:16)      HOSPITAL MEDICATIONS:  MEDICATIONS  (STANDING):  albuterol    0.083% 2.5 milliGRAM(s) Nebulizer every 6 hours  apixaban 5 milliGRAM(s) Oral every 12 hours  artificial tears (preservative free) Ophthalmic Solution 1 Drop(s) Both EYES every 4 hours  atorvastatin 10 milliGRAM(s) Oral at bedtime  aztreonam  IVPB 2000 milliGRAM(s) IV Intermittent <User Schedule>  chlorhexidine 0.12% Liquid 15 milliLiter(s) Oral Mucosa every 12 hours  chlorhexidine 2% Cloths 1 Application(s) Topical daily  dextrose 5%. 1000 milliLiter(s) (100 mL/Hr) IV Continuous <Continuous>  dextrose 5%. 1000 milliLiter(s) (50 mL/Hr) IV Continuous <Continuous>  dextrose 50% Injectable 12.5 Gram(s) IV Push once  dextrose 50% Injectable 25 Gram(s) IV Push once  dextrose 50% Injectable 25 Gram(s) IV Push once  dextrose 50% Injectable 25 Gram(s) IV Push once  droxidopa 100 milliGRAM(s) Oral three times a day  epoetin odalis (EPOGEN) Injectable 18264 Unit(s) IV Push <User Schedule>  ferrous    sulfate Liquid 300 milliGRAM(s) Oral daily  glucagon  Injectable 1 milliGRAM(s) IntraMuscular once  insulin lispro (ADMELOG) corrective regimen sliding scale   SubCutaneous every 6 hours  insulin NPH human recombinant 6 Unit(s) SubCutaneous every 6 hours  levETIRAcetam  Solution 1000 milliGRAM(s) Oral daily  levETIRAcetam  Solution 250 milliGRAM(s) Oral <User Schedule>  midodrine. 30 milliGRAM(s) Oral every 8 hours  pantoprazole  Injectable 40 milliGRAM(s) IV Push two times a day  petrolatum Ophthalmic Ointment 1 Application(s) Both EYES four times a day  sodium chloride 1 Gram(s) Oral two times a day    MEDICATIONS  (PRN):  acetaminophen   Oral Liquid .. 650 milliGRAM(s) Oral every 6 hours PRN Temp greater or equal to 38C (100.4F), Mild Pain (1 - 3)  dextrose Oral Gel 15 Gram(s) Oral once PRN Blood Glucose LESS THAN 70 milliGRAM(s)/deciliter  diphenhydrAMINE Elixir 50 milliGRAM(s) Oral once PRN Rash and/or Itching  sodium chloride 0.9% Bolus. 250 milliLiter(s) IV Bolus every 5 minutes PRN SBP LESS THAN or EQUAL to 90 mmHg  sodium chloride 0.9% lock flush 10 milliLiter(s) IV Push every 1 hour PRN Pre/post blood products, medications, blood draw, and to maintain line patency      PHYSICAL EXAMINATION  General: NAD   HEENT: Trach present   Cards: S1/S2, no murmurs   Pulm: Course vent sounds bilaterally with rhonchi and crackles. No wheezes.   Abdomen: Soft, NTND. BS (+)   Extremities: Generalized anasarca with 3+ pitting edema to BL LE. No AROM x 4  Neurology: Eyes closed only grimaces with no focal neurological deficits     LABS:                        7.8    15.27 )-----------( 499      ( 19 Oct 2023 11:44 )             25.1     10-19    135  |  99  |  36<H>  ----------------------------<  143<H>  5.0   |  27  |  1.63<H>    Ca    8.6      19 Oct 2023 11:44  Phos  4.1     10-19  Mg     2.10     10-19        Urinalysis Basic - ( 19 Oct 2023 11:44 )  Color: x / Appearance: x / SG: x / pH: x  Gluc: 143 mg/dL / Ketone: x  / Bili: x / Urobili: x   Blood: x / Protein: x / Nitrite: x   Leuk Esterase: x / RBC: x / WBC x   Sq Epi: x / Non Sq Epi: x / Bacteria: x            PAST MEDICAL & SURGICAL HISTORY:  Breast CA      Diabetes      Stroke      Cardiac arrest      HTN (hypertension)      H/O mastectomy, bilateral          FAMILY HISTORY:  No pertinent family history in first degree relatives        Social History:      RADIOLOGY:  [ ] Reviewed and interpreted by me    PULMONARY FUNCTION TESTS:    EKG:

## 2023-10-19 NOTE — PROGRESS NOTE ADULT - TIME-BASED BILLING (NON-CRITICAL CARE)
Time-based billing (NON-critical care)

## 2023-10-19 NOTE — PROGRESS NOTE ADULT - SUBJECTIVE AND OBJECTIVE BOX
S:  Pt seen and examined. Persistently hypotensive      Medications: MEDICATIONS  (STANDING):  albuterol    0.083% 2.5 milliGRAM(s) Nebulizer every 6 hours  apixaban 5 milliGRAM(s) Oral every 12 hours  artificial tears (preservative free) Ophthalmic Solution 1 Drop(s) Both EYES every 4 hours  atorvastatin 10 milliGRAM(s) Oral at bedtime  aztreonam  IVPB 2000 milliGRAM(s) IV Intermittent <User Schedule>  chlorhexidine 0.12% Liquid 15 milliLiter(s) Oral Mucosa every 12 hours  chlorhexidine 2% Cloths 1 Application(s) Topical daily  dextrose 5%. 1000 milliLiter(s) (50 mL/Hr) IV Continuous <Continuous>  dextrose 5%. 1000 milliLiter(s) (100 mL/Hr) IV Continuous <Continuous>  dextrose 50% Injectable 12.5 Gram(s) IV Push once  dextrose 50% Injectable 25 Gram(s) IV Push once  dextrose 50% Injectable 25 Gram(s) IV Push once  dextrose 50% Injectable 25 Gram(s) IV Push once  droxidopa 100 milliGRAM(s) Oral three times a day  epoetin odalis (EPOGEN) Injectable 13129 Unit(s) IV Push <User Schedule>  ferrous    sulfate Liquid 300 milliGRAM(s) Oral daily  glucagon  Injectable 1 milliGRAM(s) IntraMuscular once  insulin lispro (ADMELOG) corrective regimen sliding scale   SubCutaneous every 6 hours  insulin NPH human recombinant 6 Unit(s) SubCutaneous every 6 hours  levETIRAcetam  Solution 1000 milliGRAM(s) Oral daily  levETIRAcetam  Solution 250 milliGRAM(s) Oral <User Schedule>  midodrine. 30 milliGRAM(s) Oral every 8 hours  pantoprazole  Injectable 40 milliGRAM(s) IV Push two times a day  petrolatum Ophthalmic Ointment 1 Application(s) Both EYES four times a day  phenylephrine    Infusion 0.01 MICROgram(s)/kG/Min (0.13 mL/Hr) IV Continuous <Continuous>  sodium chloride 1 Gram(s) Oral two times a day    MEDICATIONS  (PRN):  acetaminophen   Oral Liquid .. 650 milliGRAM(s) Oral every 6 hours PRN Temp greater or equal to 38C (100.4F), Mild Pain (1 - 3)  dextrose Oral Gel 15 Gram(s) Oral once PRN Blood Glucose LESS THAN 70 milliGRAM(s)/deciliter  diphenhydrAMINE Elixir 50 milliGRAM(s) Oral once PRN Rash and/or Itching  sodium chloride 0.9% Bolus. 250 milliLiter(s) IV Bolus every 5 minutes PRN SBP LESS THAN or EQUAL to 90 mmHg  sodium chloride 0.9% lock flush 10 milliLiter(s) IV Push every 1 hour PRN Pre/post blood products, medications, blood draw, and to maintain line patency       Vitals:  Vital Signs Last 24 Hrs  T(C): 37.3 (19 Oct 2023 20:00), Max: 37.3 (19 Oct 2023 20:00)  T(F): 99.1 (19 Oct 2023 20:00), Max: 99.1 (19 Oct 2023 20:00)  HR: 117 (19 Oct 2023 20:00) (100 - 126)  BP: 106/49 (19 Oct 2023 20:00) (62/40 - 127/54)  BP(mean): 67 (19 Oct 2023 20:00) (42 - 69)  RR: 24 (19 Oct 2023 20:00) (19 - 24)  SpO2: 98% (19 Oct 2023 20:00) (91% - 118%)    Parameters below as of 19 Oct 2023 20:00  Patient On (Oxygen Delivery Method): ventilator    O2 Concentration (%): 35        Neurological Exam:  Mental Status: Eyes closed, off sedation. Does not follow commands,  + trach to vent and NGT   Cranial Nerves: PERRL slightly sluggish, L subconjunctival hemorrhage  Motor: Moves upper extremities spontaneously, tremor R > L  no movement noted in lowers  Sensation: WD to noxious x4    I personally reviewed the below data/images/labs:         LABS:                          7.8    15.27 )-----------( 499      ( 19 Oct 2023 11:44 )             25.1     10-19    135  |  99  |  36<H>  ----------------------------<  143<H>  5.0   |  27  |  1.63<H>    Ca    8.6      19 Oct 2023 11:44  Phos  4.1     10-19  Mg     2.10     10-19          Urinalysis Basic - ( 19 Oct 2023 11:44 )    Color: x / Appearance: x / SG: x / pH: x  Gluc: 143 mg/dL / Ketone: x  / Bili: x / Urobili: x   Blood: x / Protein: x / Nitrite: x   Leuk Esterase: x / RBC: x / WBC x   Sq Epi: x / Non Sq Epi: x / Bacteria: x          < from: CT Head No Cont (08.15.23 @ 17:20) >    ACC: [id number] EXAM:  CT BRAIN   ORDERED BY: MINAL SAM     PROCEDURE DATE:  08/15/2023          INTERPRETATION:  Clinical indication: Change in neuro exam.    Multiple axial sections were performed from base to vertex without   contrast enhancement. Coronal and sagittal reconstructions were   reformatted well.    This exam is compared prior head CT performed on August 11, 2023    Parenchymal volume loss and chronic microvessel ischemic changes are   again seen.    Abnormal low-attenuation involving the left occipital cortical   subcortical region is again seen. 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    No evidence of acute hemorrhage mass or mass effect is seen.    Evaluation of the osseous structures with appropriate window demonstrates   sclerotic changes about the left mastoid region which appears stable.   Opacification left middle ear region is again seen.    Patient is status post bilateral cataract surgery.    Impression: Stable exam.    < end of copied text >    vEEG:    Clinical Impression:  - Severe diffuse non-specific cerebral dysfunction  - There were no epileptiform abnormalities or seizures recorded.        CTH 8/11:    VENTRICLES AND SULCI: Age-appropriate involutional change  INTRA-AXIAL:  Old left PCA infarct as seen on the prior unchanged.   Microvascular ischemic changes involving the periventricular and   subcortical white matter as seen previously  EXTRA-AXIAL:  No mass or collection is seen.  VISUALIZED SINUSES:  Clear.  VISUALIZED MASTOIDS: Left mastoid sclerosis  CALVARIUM: Infiltrative appearance tothe calvarium may be indicative of   marrow infiltration on the basis of patient's known diagnosis of breast   cancer. MISCELLANEOUS:  None.    IMPRESSION:  No significant interval change compared with 7/17/2023 in   left PCA infarct which is old. Microvascular ischemic changes involving   the periventricular and subcortical white matter as seen   previously.Questionable lesions at the level of the calvarium related to   possible breast CA. Clinical correlation recommended.    --- End of Report ---      ct< from: CT Head No Cont (09.26.23 @ 21:02) >  IMPRESSION: Vague questionable areas of hypoattenuation within the   bilateral cerebellar hemispheres which may be compatible with   acute/subacute ischemia. Recommend further evaluation with a brain MRI   study, provided there are no MRI contraindications.    No acute intracranial hemorrhage.    Previously seen questionable vague wedge-shaped area of hypoattenuation   in the left parietal lobe with artifactual secondary to motion and volume   averaging with a prominent sulcus.    Chronic left occipital lobe infarct.    < end of copied text >    < from: CT Head No Cont (10.03.23 @ 20:46) >    IMPRESSION:    No acute intracranial hemorrhage or mass effect. Evaluation of the   posterior fossa degraded by streak artifact from dental amalgam.   Lucencies in the bilateral cerebellum may be artifactual.    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    Bilateral middle ear and mastoid effusions which are also seen on prior   exam.    EEG Classification / Summary:  Abnormal EEG in the awake, drowsy states.   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   -No seizures

## 2023-10-19 NOTE — PROGRESS NOTE ADULT - ASSESSMENT
73 f with DM, HTN, CKD, breast CA, latent TB (treated first with INH then had rash and switched to rifampin), MSSA bacteremia, c-diff, respiratory arrest and cardiac arrest (2018), s/p trach/PEG then removed, CVA with residual weakness, aspiration PNA, 1/4 sputums had MAC 7/2023, admitted 8/11 with respiratory failure no fever or WBC but was intubated and then spiked  blood cx negative, sputum cx with MSSA  developed rash and renal failure after cefepime, was on HD then discontinued became uremic and now again on HD  head MRI 8/17 with acute cerebellar infarct  had renal failure, AIN? family declined renal biopsy started on prednisone  s/p trach 9/1 and BAL with MSSA   ET cx with ESBL and MSSA  blood cx 9/1: MSSA with hypotension  repeat negative 9/4, 9/8, TTE no veg s/p 4 weeks of vanco/dapto through 10/2  L ear edema and otitis externa, s/p a long course of ana cristina, the last cx showed candida and ENT stated it could be fungal (saw black spores?) so was also given 7 days of fluconazole  pt had a rash again, unclear what caused it, fluconazole  still with intermittent fevers and then sputum cx with carbapenem resistant pseudomonas, s/p a course of polymixin  no improvement in mental status and ?seizure as well, neuro following  had drop in Hgb s/p transfusion and then had again febrile 10/15-10.17 with leukocytosis, rash and eosinophilia, sputum growing pseudomonas and proteus  (both sensitive to aztreonam) ?VAP vs a transfusion reaction       * aztreonam renally dosed, will likely complete a 10 day course, started 10/17, now day 3  * monitor CBC/diff and CMP        The above assessment and plan was discussed with the primary team    Yumiko Conner MD  contact on teams  After 5pm and on weekends call [phone]

## 2023-10-19 NOTE — PROGRESS NOTE ADULT - ASSESSMENT
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 While in MICU patient noted with worsening renal failure requiring HD initiation, CGE/ Melena s/p EGD 8/31 found with esophagitis and erosive gastropathy, new right cerebellar infarct and fevers second to ESBL COLI and MSSA VAP, MSSA bacteremia, left ear otitis externa and drug rxn to cephalosporins. 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 Case discussed at length renal and given good UOP attempted renal recovery, however failed, and upon reinitiation of HD attempt. R IJ SHILEY failed. Attempted volume removal with Bumex GTT however course complicated by hypotension requiring transfer back to MICU 9/26 for pressor support. Diuresis dc'ed, pressors weaned off and stress steroids started. L IJ SHILEY placed (9/30) and HD reinstated. Course further complicated by AOCD and  PSA and Proteus VAP. Patient transferred back to RCU 10/1 with course complicated by volume overload and grossly resistant organisms. Course further c/b intermittent persistent fevers and allergic transfusion reaction s/p PRBC transfusion (10/15).    NEUROLOGY  # NEW cerebella infarct   - Baseline MS AOX3 with short term memory changes per family however noted with concern for poor mentation in ICU  - MRI (8/17) with NEW right cerebellar infarct and old left PCA/occipital infarcts  - STEPHANIE (8/17) with AV showing two linear mobile echogenic elements attached to valve leaflets consistent with Lambel's excrescence, but no TRINITY thrombus, and lambel's excrescence with uncertain clinical implication.   - EEG (8/11-8/15) with no seizures   - ILR interrogation (9/7) with SR and PACs falsely recorded as AF.   - Continue on ASA and lipitor for medical management     # Seizures   - Course complicated by questionable head and body shaking with prolactin elevated 9/8. Discussed for spot EEG however held given low likelihood of seizures noted and acute respiratory illnesses   - Course further complicated with right arm twitching and RPT EEG (10/4) with no seizure however but noted with increase seizure potential in left posterior quadrants.   - RPT EEG (10/5) with possible focal seizures and risk of focal-onset seizures from multiple locations, most prominent in the left posterior temporal/posterocentral region  - RPT EEG (10/6) with RUE twitching are likely non-epileptic in nature, however risk of focal-onset seizures from multiple locations  - Continue on Keppra PPX     # Metabolic vs Uremic Encephalopathy   - Lethargy noted with progression to stupor thought to be second to uremia.   - RPT CTH (9/26) with vague areas of hypoattenuation within the bilateral cerebellar hemispheres which may be compatible with acute/subacute ischemia.   - Discussed CTH with neurology and no signs of new infarct noted.   - HD reinstated in ICU with improvement in uremia however mentation remained   - Poor mentation likely in setting of toxic encephalopathy in setting of infections.   - Pending MRI BRAIN    CARDIOVASCULAR  # HTN Urgency   # Septic vs vasoplegic shock   - Labile BPs ranging in between SBP 80s-200s and attempted labetalol, however noted with hypotension and bradycardia likely from BB toxicity vs shock state?    - Holding Labetalol, Catapres and Catapres.    - Attempted volume removal with bumex diuresis however noted with return of shock state requiring pressor support and transfer back to ICU.   - Pressors weaned off to stress dose (last day 10/4) and Midodrine   - Continue on Midodrine 30 TID (9/29 - ) however continued labile BPs with difficult HD sessions. Droxiopa 100 TID added (10/18 - )   - Ensure Midodrine and Droxidopa are timed 30-60 minutes prior to HD  - DO NOT HOLD DROXIDOPA AND MIDODRINE     # Hx of HFpEF with likely ADHF with volume overload.   - ECHO (7/2023) with EF 59 with severe LVH and diastolic dysfunction   - STEPHANIE (8/18) with normal LVRVSF however noted with increased LA pressures and persistent LA septal bowing into RA.   - ECHO (8/11) with EF 60 with mild LVH, normal LVRVSF, and mild ddfxn.  - Grossly volume overloaded and removing volume with HD midodrine assisted sessions     HEENT   # Left ear OE with acute on chronic left-sided otomastoiditis  - ENT evaluation (9/7) with no mastoid tenderness, however found with positive swelling and excoriation to left auricle and tenderness to manipulation of auricle. Debrided crusting of auricle and EAC evaluated with edema and unable to visualize TM. EAC debrided and found with watery exudate.   - CT IAC with acute on chronic left-sided otitis externa and acute on chronic left-sided otomastoiditis. Superimposed left-sided tympanosclerosis. Superimposed cholesteatoma formation within the left tympanomastoid cavity cannot be excluded  - Completed CiproHC (9/5 - 9/16), Bradford (9/1-10/1) and acetic acid and bacitracin.   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   - Continue on empiric valtrex 500mg BID (9/29 - 10/8) per ophtho and ID  - Continue preservative free artificial tears 4 times a day in the both eye  - Continue lacrilube ointment twice a day in the both eyes     # Oral Lesions with questionable Zoster vs Trauma?   - Patient with tongue pain and seen with anterior ulcerations consolidating and crusting and posterior ulceration.  - Case discussed with pathology resident and culture/ cytology sent.   - HSV negative and Path (9/6) with normal appearing epithelial cells singly and in clusters devoid of viral cytopathic effect.   - Continue on magic mouth wash for pain    RESPIRATORY  # AHHRF second to volume overload   - Hx of CKD and HFpEF presented with SOB and hypercarbia second to volume overload requiring intubation and HD initiation   - Vent weaning attempted however limited requiring tracheostomy (9/1) with IP   - Course complicated by PIPs elevated and found with tracheomalacia and volume overloaded.   - CT CHEST (9/8) with tracheomalacia, BL pleural effusions and continued consolidations   - Continue on vent and given TBM increased PEEP to 8 with improvement in dynamic collapse, but PIP waxes and wanes with volume overloaded and secretions.  - Changed vent to PC 24/35/8/40 with slight improvement in PIP (40s)   - Continue on albuterol and chest PT  - Continue volume removal with HD   - Attempted HTS but noted with hemoptysis and held   - Hold Atrovent given thick secretions   - Hold IPV given high inspiratory pressures      GI  # UGIB   - CGE x 1 episode on 8/24 and melena x 2 episodes on 8/31 likely residual blood.   - EGD (8/31) found with esophagitis and erosive gastropathy  - Continue on PPI BID through late October and then PPI QD     # Dysphagia   - NGT-TF continued   - Pending PEG however needs improvement prior to placement.     RENAL  # Progressive CKD   - Presented volume overload and progressive RUSS on CKD   - POCUS with no signs of hydronephrosis in ICU  - Pressor support started with improvement in renal function in ICU  - Attempted steroid course in ICU without improvement in renal function   - Case discussed at length with renal and initiated on HD in ICU   - Remain on HD while in RCU however noted to be volume overloaded. Attempted Bumex pushes renal recovery and patient noted with good UOP, but BUN/ CRE continued to rise without improvement on diuresis.   - Ultimately resumed on HD MWF TIW with midodrine assisted volume removal, however pressure remains soft with difficulty removing aggressive fluids.   - Added further pressor support as above and will continue to attempt HD/ UF     # Hyponatremia   - Continue on salt tabs 1G  (decreased 10/6) and weaning off as tolerated with volume removal. Monitor sodium     # Hyperphosphatemia to Hypophosphatemia with HD  - PTH normal and elevated phos likely from renal failure  - Phos binder with Renvela started with improvement however then noted with hypophosphatemia and Renvela stopped.     INFECTIOUS DISEASE  # ESBL ECOLI and MSSA VAP c/b MSSA bacteremia   - RVP/ COVID (8/11) negative   - SCx (8/11) with MSSA s/p cefepime (8/12-8/13) and then ancef (8/14) however noted with drug reaction and then changed to vanco and aztreonam (8/12-8/13) in ICU .   - Course complicated by recurrent fevers and return of MSSA with bacteremia.   - SCx (9/1) with MSSA and ESBL ECOLI   - BAL (9/1) with MSSA   - BCx (9/1) with MSSA and cleared on (9/4)   - ECHO (9/6) unable to rule out endocarditis   - Continue on Vanco (9/4-9/22) however noted with rashes of uncertain etiology and replaced with Bradford (9/4 - 10/2) and DAPTO (9/26-10/2) for  completion.     # Proteus and  PSA VAP/ Trachitis   - Continued fevers and SCx (9/29) with MDR  PSA and Proteus   - Continued on Bradford as above however noted with resistance and changed to Zosyn (10/2 - 10/5) with course complicated by possible drug rxn. Zosyn changed to Aztreonam (10/5 - 10/6) however RPT SCx (10/3) with  PSA however only intermediate coverage to aztreonam and amikacin.  PSA grossly resistant to other antibiotics other than cephalosporins however patient with reactions in the past. Case discussed with ID and ID pharmacist and change to Polymyxin (10/6 - 10/15) however continues to spike fevers likely second to Polymyxin only intermediate coverage and Recarbio resistant .   - Return of fever and RPT Sputum (10/14) wtith  PSA now sensitive on Aztreonam (10/16 - 10/25) x 10 days.   - Overall difficulty with ABX tailoring given allergic rxns and resistant organisms.     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  - HH remained labile requiring xfusion (10/15) however noted with questionable reaction with rash and BB called. Work up being sent.   - Monitor HH     VASCULAR   # LLE POP DVT   - US BL LE (9/10) with acute left deep vein thrombosis of the left popliteal vein.  - RUE swollen and VA DOPPLER RUE (10/3) with R IJ DVT and R brachial DVT.  - Eliquis held for permacath however procedure held.   - Eliquis remains on hold second to mild suction trauma hemoptysis.   - Resume Eliquis when able.      # HD access  - L IJ CLAUDIA (9/27 - )   - Planned for IR permacath cancelled for today and will reattempt when infectious status improves.     ONC   # Hx of Breast CA   - Patient dx in 2018 and s/p BL mastectomy (radical on right) and chemo and RT.   - Supportive care.     ENDOCRINE  # IDDM2   - Continued on NPH with ISS, however noted with hypoglycemic episodes and NPH dc'ed.    - Finger sticks trending up off NPH.   - Continue on NPH 6U with moderate ISS.   - Adjust as need     SKIN  # Drug Eruption   - Attempted cefepime (8/12) vs ancef (8/13-8/14) and then noted with drug rxn in ICU. Continue on steroids with prednisone 40 (8/18 - 9/2) then prednisone 30 (9/3-9/7), then prednisone 20 (9/8 - 9/10), then prednisone 10mg (9/11-9/13) then turn off.   - Attempted Zosyn (10/2-10/5) with possible rash. Zosyn turned off with improvement.   - Hold further cephalosporins or PCNs at this time   - Monitor for further drug rxns.     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 While in MICU patient noted with worsening renal failure requiring HD initiation, CGE/ Melena s/p EGD 8/31 found with esophagitis and erosive gastropathy, new right cerebellar infarct and fevers second to ESBL COLI and MSSA VAP, MSSA bacteremia, left ear otitis externa and drug rxn to cephalosporins. 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 Case discussed at length renal and given good UOP attempted renal recovery, however failed, and upon reinitiation of HD attempt. R IJ SHILEY failed. Attempted volume removal with Bumex GTT however course complicated by hypotension requiring transfer back to MICU 9/26 for pressor support. Diuresis dc'ed, pressors weaned off and stress steroids started. L IJ SHILEY placed (9/30) and HD reinstated. Course further complicated by AOCD and  PSA and Proteus VAP. Patient transferred back to RCU 10/1 with course complicated by grossly resistant organisms in sputum and empirically treated with polymixin. Course further c/b intermittent anemia and allergic transfusion reaction s/p PRBC transfusion (10/15), volume overload and hypotension with poor HD tolerance and high PIPs changed to PC.     NEUROLOGY  # NEW cerebella infarct   - Baseline MS AOX3 with short term memory changes   - MRI (8/17) with NEW right cerebellar infarct and old left PCA/occipital infarcts  - STEPHANIE (8/17) with AV showing two linear mobile echogenic elements attached to valve leaflets consistent with Lambel's excrescence, but no TRINITY thrombus.  - EEG (8/11-8/15) with no seizures   - ILR interrogation (9/7) with SR and PACs falsely recorded as AF.   - Continue on ASA and lipitor for medical management   - Continue on Eliquis given concern for embolic source     # Seizures   - Course complicated by questionable head and body shaking with right arm twitching   - EEG (10/4-10/6) with no seizure however but noted with increase seizure potential in left posterior quadrants.   - Continue on Keppra PPX     # Metabolic vs Uremic Encephalopathy   - Lethargy noted with progression to stupor thought to be second to uremia however no improvement with HD.   - RPT CTH (9/26) with no new infarcts   - Poor mentation likely in setting of toxic encephalopathy in setting of infections however pending MRI BRAIN.    CARDIOVASCULAR  # Septic vs vasoplegic shock   - Recurrent shock state second to infections and recently required return to MICU for pressor support   - Pressors weaned off to stress dose and Midodrine   - Weaned off steroids however Midodrine continued with unstable BPs and poor HD tolerance.   - Midodrine increased to 30 TID (9/29 - ) and Droxidopa 100 TID (10/18 - ) without successful hemodynamic stability or HD tolerance     # Hx of HFpEF with likely ADHF with volume overload.   - ECHO (7/2023) with EF 59 with severe LVH and diastolic dysfunction   - STEPHANIE (8/18) with normal LVRVSF however noted with LA septal bowing into RA.   - ECHO (8/11) with EF 60 with mild LVH, normal LVRVSF, and mild ddfxn.  - Grossly volume overloaded and attempting to remove volume with midodrine and droxidopa assisted HD however poor tolerance.     HEENT   # Left ear OE with acute on chronic left-sided otomastoiditis  - Completed CiproHC (9/5 - 9/16), Bradford (9/1-10/1) and acetic acid and bacitracin.   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   - Completed empiric valtrex 500mg BID (9/29 - 10/8) per ophtho and ID  - Continue preservative free artificial tears 4 times a day in the both eye  - Continue lacrilube ointment twice a day in the both eyes     # Oral Lesions with questionable Zoster vs Trauma?   - Patient with tongue pain and seen with anterior ulcerations consolidating and crusting and posterior ulceration.  - Case discussed with pathology resident and HSV negative and Path (9/6) with normal appearing epithelial cells  - Continue on magic mouth wash for pain    RESPIRATORY  # AHHRF second to volume overload vs PNA vs tracheomalacia   - Hx of CKD and HFpEF presented with SOB and hypercarbia second to volume overload requiring intubation on admission   - Vent weaning attempted however limited requiring tracheostomy (9/1) with IP   - Course complicated by PIPs elevated and found with tracheomalacia aon CT   - Increased PEEP to 8 with improvement in dynamic collapse, but PIP waxes and wanes with volume overloaded and secretions.  - Changed vent to PC 24/35/8/40 with slight improvement in PIP (40s)   - Continue on albuterol and chest PT and attempted HTS but noted with hemoptysis and held   - Continue to attempt volume removal with HD however poor tolerance given BP  - Hold Atrovent given thick secretions   - Hold IPV given high inspiratory pressures      GI  # UGIB   - CGE x 1 episode on 8/24 and melena x 2 episodes on 8/31 likely residual blood.   - EGD (8/31) found with esophagitis and erosive gastropathy  - Continue on PPI BID through late October and then PPI QD     # Dysphagia   - NGT-TF continued   - Pending PEG however needs improvement prior to placement.     RENAL  # Progressive CKD   - Presented volume overload and progressive RUSS on CKD   - Case discussed at length with renal and initiated on HD in ICU   - Remain on HD while in RCU however noted to be volume overloaded and poor access. Attempted Bumex renal recovery and patient noted with good UOP, but BUN/ CRE continued to rise without improvement on diuresis.   - Ultimately resumed on HD TTHS TIW with midodrine and droxidopa however poor tolerance   - Plan for IV pressor support assisted HD, however if unable to tolerate will likely not be a candidate for CRRT or further HD    # Hyponatremia   - Continue on salt tabs 1G  BID and monitor sodium     # Hyperphosphatemia to Hypophosphatemia with HD  - PTH normal and elevated phos likely from renal failure  - Phos binder with Renvela started with improvement however then noted with hypophosphatemia and Renvela stopped.     INFECTIOUS DISEASE  # ESBL ECOLI and MSSA VAP c/b MSSA bacteremia   - RVP/ COVID (8/11) negative   - SCx (8/11) with MSSA s/p cefepime (8/12-8/13) and then ancef (8/14) however noted with drug reaction and then changed to vanco and aztreonam (8/12-8/13) in ICU .   - Course complicated by recurrent fevers and return of MSSA with bacteremia.   - SCx (9/1) with MSSA and ESBL ECOLI   - BAL (9/1) with MSSA   - BCx (9/1) with MSSA and cleared on (9/4)   - ECHO (9/6) unable to rule out endocarditis   - Continue on Vanco (9/4-9/22) however noted with rashes of uncertain etiology and replaced with Bradford (9/4 - 10/2) and DAPTO (9/26-10/2) for  completion.     # Proteus and  PSA VAP/ Trachitis   - Continued fevers and SCx (9/29) with MDR  PSA and Proteus   - Continued on Bradford as above however noted with resistance and changed to Zosyn (10/2 - 10/5) with course complicated by possible drug rxn. Zosyn changed to Aztreonam (10/5 - 10/6) however RPT SCx (10/3) with  PSA however only intermediate coverage to aztreonam and amikacin.  PSA grossly resistant to other antibiotics other than cephalosporins however patient with reactions in the past. Case discussed with ID and ID pharmacist and change to Polymyxin (10/6 - 10/15) however continues to spike fevers likely second to Polymyxin only intermediate coverage and Recarbio resistant .   - Return of fever and RPT Sputum (10/14) wtith  PSA now sensitive on Aztreonam (10/16 - 10/25) x 10 days.   - Overall difficulty with ABX tailoring given allergic rxns and resistant organisms.     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  - HH remained labile requiring xfusion (10/15) however noted with questionable reaction with rash and BB called. Work up being sent.   - Monitor HH     VASCULAR   # LLE POP DVT and R IJ and BRACHIAL DVT   - US BL LE (9/10) with acute left deep vein thrombosis of the left popliteal vein.  - RUE swollen and VA DOPPLER RUE (10/3) with R IJ DVT and R brachial DVT.  - Eliquis held second to intermittent suction trauma hemoptysis and now resumed    # HD access  - L IJ CLAUDIA (9/27 - )   - Planned for IR permacath and will attempt when infectious status improves.     ONC   # Hx of Breast CA   - Patient dx in 2018 and s/p BL mastectomy (radical on right) and chemo and RT.   - Supportive care.     ENDOCRINE  # IDDM2   - Continued on NPH with ISS, however noted with hypoglycemic episodes and NPH dc'ed.    - Finger sticks trending up off NPH.   - Continue on NPH 6U with moderate ISS.   - Adjust as need     SKIN  # Drug Eruption   - Attempted cefepime (8/12) vs ancef (8/13-8/14) and then noted with drug rxn in ICU. Continue on steroids with prednisone 40 (8/18 - 9/2) then prednisone 30 (9/3-9/7), then prednisone 20 (9/8 - 9/10), then prednisone 10mg (9/11-9/13) then turn off.   - Attempted Zosyn (10/2-10/5) with possible rash. Zosyn turned off with improvement.   - Hold further cephalosporins or PCNs at this time   - Monitor for further drug rxns.     ETHICS/ GOC    - Attempted palliative discussion however family not interested and wishes for FULL CODE  - Reattempted multiple discussions and family refusing to speak further on GOC    DISPO - Transfer to MICU 12

## 2023-10-19 NOTE — CHART NOTE - NSCHARTNOTEFT_GEN_A_CORE
MICU ACCEPT NOTE    CHIEF COMPLAINT: Patient is a 73y old  Female who presents with a chief complaint of Respiratory distress (19 Oct 2023 14:56)      HPI:  72 y/o F DM on insulin, HTN, CKD,breast CA, respiratory arrest and cardiac arrest (2018), CVA with residual weakness, aspiration PNA h/o trache, PEG, both removed presents with respiratory distress requiring intubation. Had recent admission d/c 7/22/23 for cough and respiratory distress (no intubation) thought to be i/s/o aspiration PNA s/p cefepime course; developed rash in response. Infectious w/u was negative at this time. Was discharged home, daughter and  at bedside providing history.     Reports that she was initially improving with O2 sats in the high 90s on room air, however, then became more lethargic and not herself (baseline mental status AOx3). Also had worsening shortness of breath while laying down and leg swelling. Contacted cardiologist who started her on bumex 1mg daily. Developed low sugars overnight before admission and was given juice with some food, daughter reports no coughing during episode. At around 4-5 AM developed vomiting and coughing, O2 sats dropped to 80s and EMS was called. Denies fevers/chills, dysuria or urinary frequency, chest pain, palpitations. Uses wheelchair at home however family moves her around frequently.     In ED, was on BiPAP with increased WOB and was intubated. Found to have elevated pro-BNP and CO2 of 111 on VBG. CXR with small right pleural effusion, started on vanc in the ED.  (11 Aug 2023 09:08)      PAST MEDICAL & SURGICAL HISTORY:  Breast CA      Diabetes      Stroke      Cardiac arrest      HTN (hypertension)      H/O mastectomy, bilateral          FAMILY HISTORY:  No pertinent family history in first degree relatives        SOCIAL HISTORY:  Smoking:   Substance Use:   EtOH Use:   Advance Directives:     MEDICATIONS  (STANDING):  albuterol    0.083% 2.5 milliGRAM(s) Nebulizer every 6 hours  apixaban 5 milliGRAM(s) Oral every 12 hours  artificial tears (preservative free) Ophthalmic Solution 1 Drop(s) Both EYES every 4 hours  atorvastatin 10 milliGRAM(s) Oral at bedtime  aztreonam  IVPB 2000 milliGRAM(s) IV Intermittent <User Schedule>  chlorhexidine 0.12% Liquid 15 milliLiter(s) Oral Mucosa every 12 hours  chlorhexidine 2% Cloths 1 Application(s) Topical daily  dextrose 5%. 1000 milliLiter(s) (50 mL/Hr) IV Continuous <Continuous>  dextrose 5%. 1000 milliLiter(s) (100 mL/Hr) IV Continuous <Continuous>  dextrose 50% Injectable 12.5 Gram(s) IV Push once  dextrose 50% Injectable 25 Gram(s) IV Push once  dextrose 50% Injectable 25 Gram(s) IV Push once  dextrose 50% Injectable 25 Gram(s) IV Push once  droxidopa 100 milliGRAM(s) Oral three times a day  epoetin odalis (EPOGEN) Injectable 70341 Unit(s) IV Push <User Schedule>  ferrous    sulfate Liquid 300 milliGRAM(s) Oral daily  glucagon  Injectable 1 milliGRAM(s) IntraMuscular once  insulin lispro (ADMELOG) corrective regimen sliding scale   SubCutaneous every 6 hours  insulin NPH human recombinant 6 Unit(s) SubCutaneous every 6 hours  levETIRAcetam  Solution 1000 milliGRAM(s) Oral daily  levETIRAcetam  Solution 250 milliGRAM(s) Oral <User Schedule>  midodrine. 30 milliGRAM(s) Oral every 8 hours  pantoprazole  Injectable 40 milliGRAM(s) IV Push two times a day  petrolatum Ophthalmic Ointment 1 Application(s) Both EYES four times a day  sodium chloride 1 Gram(s) Oral two times a day    MEDICATIONS  (PRN):  acetaminophen   Oral Liquid .. 650 milliGRAM(s) Oral every 6 hours PRN Temp greater or equal to 38C (100.4F), Mild Pain (1 - 3)  dextrose Oral Gel 15 Gram(s) Oral once PRN Blood Glucose LESS THAN 70 milliGRAM(s)/deciliter  diphenhydrAMINE Elixir 50 milliGRAM(s) Oral once PRN Rash and/or Itching  sodium chloride 0.9% Bolus. 250 milliLiter(s) IV Bolus every 5 minutes PRN SBP LESS THAN or EQUAL to 90 mmHg  sodium chloride 0.9% lock flush 10 milliLiter(s) IV Push every 1 hour PRN Pre/post blood products, medications, blood draw, and to maintain line patency      Allergies    isoniazid (Rash)  nafcillin (Unknown)  hydrALAZINE (Rash)  vitamin E (Short breath; Urticaria; Hives)  doxycycline (Rash)  cefepime (Rash)  NIFEdipine (Urticaria; Hives)  Zosyn (Rash)    Intolerances        REVIEW OF SYSTEMS:  CONSTITUTIONAL: No weakness, fevers or chills  EYES/ENT: No visual changes;  No vertigo or throat pain   NECK: No pain or stiffness  RESPIRATORY: No cough, wheezing, hemoptysis; No shortness of breath  CARDIOVASCULAR: No chest pain or palpitations  GASTROINTESTINAL: No abdominal or epigastric pain. No nausea, vomiting, or hematemesis; No diarrhea or constipation. No melena or hematochezia.  GENITOURINARY: No dysuria, frequency or hematuria  NEUROLOGICAL: No numbness or weakness  SKIN: No itching, rashes  [ ] All other systems negative  [ ] Unable to assess ROS because ________    OBJECTIVE:  ICU Vital Signs Last 24 Hrs  T(C): 36.7 (19 Oct 2023 15:25), Max: 37.2 (19 Oct 2023 04:49)  T(F): 98.1 (19 Oct 2023 15:25), Max: 98.9 (19 Oct 2023 04:49)  HR: 123 (19 Oct 2023 15:25) (100 - 123)  BP: 83/28 (19 Oct 2023 15:25) (62/40 - 127/54)  BP(mean): --  ABP: --  ABP(mean): --  RR: 20 (19 Oct 2023 15:25) (18 - 20)  SpO2: 98% (19 Oct 2023 15:25) (91% - 100%)    O2 Parameters below as of 19 Oct 2023 15:25  Patient On (Oxygen Delivery Method): ventilator    O2 Concentration (%): 35      Mode: AC/ CMV (Assist Control/ Continuous Mandatory Ventilation), RR (machine): 24, FiO2: 35, PEEP: 8, ITime: 0.8, MAP: 19, PC: 32, PIP: 40    10-18 @ 07:01  -  10-19 @ 07:00  --------------------------------------------------------  IN: 1160 mL / OUT: 0 mL / NET: 1160 mL    10-19 @ 07:01  -  10-19 @ 17:02  --------------------------------------------------------  IN: 400 mL / OUT: 900 mL / NET: -500 mL      CAPILLARY BLOOD GLUCOSE      POCT Blood Glucose.: 140 mg/dL (19 Oct 2023 11:16)      PHYSICAL EXAM:  GENERAL: NAD, lying in bed comfortably  HEAD:  Atraumatic, normocephalic  EYES: EOMI, PERRLA, conjunctiva and sclera clear  ENT: Moist mucous membranes  NECK: Supple, no JVD  HEART: S1, S2, regular rate and rhythm, no murmurs, rubs, or gallops  LUNGS: Unlabored respirations.  Clear to auscultation bilaterally, no crackles, wheezing, or rhonchi  ABDOMEN: Soft, nontender, nondistended, +BS  EXTREMITIES: 2+ peripheral pulses bilaterally. No clubbing, cyanosis, or edema  NERVOUS SYSTEM:  A&Ox3, no focal deficits   SKIN: No rashes or lesions  LINES:     LABS:                        7.8    15.27 )-----------( 499      ( 19 Oct 2023 11:44 )             25.1     Hgb Trend: 7.8<--, 7.5<--, 9.0<--, 6.8<--, 7.6<--  10-19    135  |  99  |  36<H>  ----------------------------<  143<H>  5.0   |  27  |  1.63<H>    Ca    8.6      19 Oct 2023 11:44  Phos  4.1     10-19  Mg     2.10     10-19      Creatinine Trend: 1.63<--, 1.73<--, 1.71<--, 1.29<--, 1.69<--, 1.35<--    Urinalysis Basic - ( 19 Oct 2023 11:44 )    Color: x / Appearance: x / SG: x / pH: x  Gluc: 143 mg/dL / Ketone: x  / Bili: x / Urobili: x   Blood: x / Protein: x / Nitrite: x   Leuk Esterase: x / RBC: x / WBC x   Sq Epi: x / Non Sq Epi: x / Bacteria: x            MICROBIOLOGY:     RADIOLOGY & ADDITIONAL TESTS:    ====================ASSESSMENT======================    Plan:  ====================NEUROLOGY=======================    ====================RESPIRATORY======================    ====================CARDIOVASCULAR==================    ====================/RENAL========================    ====================GI/NUTRITION=====================    ====================SKIN=============================    ====================INFECTIOUS DISEASE================    ====================ENDOCRINE=======================    ====================HEMATOLOGIC/DVT PPx=============    ====================ETHICS=========================== MICU ACCEPT NOTE    CHIEF COMPLAINT: Patient is a 73y old  Female who presents with a chief complaint of Respiratory distress (19 Oct 2023 14:56)      HPI:  74 y/o F DM on insulin, HTN, CKD,breast CA, respiratory arrest and cardiac arrest (2018), CVA with residual weakness, aspiration PNA h/o trache, PEG, both removed presents with respiratory distress requiring intubation. Had recent admission d/c 7/22/23 for cough and respiratory distress (no intubation) thought to be i/s/o aspiration PNA s/p cefepime course; developed rash in response. Infectious w/u was negative at this time. Was discharged home, daughter and  at bedside providing history.     Reports that she was initially improving with O2 sats in the high 90s on room air, however, then became more lethargic and not herself (baseline mental status AOx3). Also had worsening shortness of breath while laying down and leg swelling. Contacted cardiologist who started her on bumex 1mg daily. Developed low sugars overnight before admission and was given juice with some food, daughter reports no coughing during episode. At around 4-5 AM developed vomiting and coughing, O2 sats dropped to 80s and EMS was called. Denies fevers/chills, dysuria or urinary frequency, chest pain, palpitations. Uses wheelchair at home however family moves her around frequently.     In ED, was on BiPAP with increased WOB and was intubated. Found to have elevated pro-BNP and CO2 of 111 on VBG. CXR with small right pleural effusion, started on vanc in the ED.  (11 Aug 2023 09:08)      PAST MEDICAL & SURGICAL HISTORY:   Breast CA      Diabetes      Stroke      Cardiac arrest      HTN (hypertension)      H/O mastectomy, bilateral          FAMILY HISTORY:  No pertinent family history in first degree relatives        SOCIAL HISTORY:  Smoking:   Substance Use:   EtOH Use:   Advance Directives:     MEDICATIONS  (STANDING):  albuterol    0.083% 2.5 milliGRAM(s) Nebulizer every 6 hours  apixaban 5 milliGRAM(s) Oral every 12 hours  artificial tears (preservative free) Ophthalmic Solution 1 Drop(s) Both EYES every 4 hours  atorvastatin 10 milliGRAM(s) Oral at bedtime  aztreonam  IVPB 2000 milliGRAM(s) IV Intermittent <User Schedule>  chlorhexidine 0.12% Liquid 15 milliLiter(s) Oral Mucosa every 12 hours  chlorhexidine 2% Cloths 1 Application(s) Topical daily  dextrose 5%. 1000 milliLiter(s) (50 mL/Hr) IV Continuous <Continuous>  dextrose 5%. 1000 milliLiter(s) (100 mL/Hr) IV Continuous <Continuous>  dextrose 50% Injectable 12.5 Gram(s) IV Push once  dextrose 50% Injectable 25 Gram(s) IV Push once  dextrose 50% Injectable 25 Gram(s) IV Push once  dextrose 50% Injectable 25 Gram(s) IV Push once  droxidopa 100 milliGRAM(s) Oral three times a day  epoetin odalis (EPOGEN) Injectable 24465 Unit(s) IV Push <User Schedule>  ferrous    sulfate Liquid 300 milliGRAM(s) Oral daily  glucagon  Injectable 1 milliGRAM(s) IntraMuscular once  insulin lispro (ADMELOG) corrective regimen sliding scale   SubCutaneous every 6 hours  insulin NPH human recombinant 6 Unit(s) SubCutaneous every 6 hours  levETIRAcetam  Solution 1000 milliGRAM(s) Oral daily  levETIRAcetam  Solution 250 milliGRAM(s) Oral <User Schedule>  midodrine. 30 milliGRAM(s) Oral every 8 hours  pantoprazole  Injectable 40 milliGRAM(s) IV Push two times a day  petrolatum Ophthalmic Ointment 1 Application(s) Both EYES four times a day  sodium chloride 1 Gram(s) Oral two times a day    MEDICATIONS  (PRN):  acetaminophen   Oral Liquid .. 650 milliGRAM(s) Oral every 6 hours PRN Temp greater or equal to 38C (100.4F), Mild Pain (1 - 3)  dextrose Oral Gel 15 Gram(s) Oral once PRN Blood Glucose LESS THAN 70 milliGRAM(s)/deciliter  diphenhydrAMINE Elixir 50 milliGRAM(s) Oral once PRN Rash and/or Itching  sodium chloride 0.9% Bolus. 250 milliLiter(s) IV Bolus every 5 minutes PRN SBP LESS THAN or EQUAL to 90 mmHg  sodium chloride 0.9% lock flush 10 milliLiter(s) IV Push every 1 hour PRN Pre/post blood products, medications, blood draw, and to maintain line patency      Allergies    isoniazid (Rash)  nafcillin (Unknown)  hydrALAZINE (Rash)  vitamin E (Short breath; Urticaria; Hives)  doxycycline (Rash)  cefepime (Rash)  NIFEdipine (Urticaria; Hives)  Zosyn (Rash)    Intolerances        REVIEW OF SYSTEMS:  CONSTITUTIONAL: No weakness, fevers or chills  EYES/ENT: No visual changes;  No vertigo or throat pain   NECK: No pain or stiffness  RESPIRATORY: No cough, wheezing, hemoptysis; No shortness of breath  CARDIOVASCULAR: No chest pain or palpitations  GASTROINTESTINAL: No abdominal or epigastric pain. No nausea, vomiting, or hematemesis; No diarrhea or constipation. No melena or hematochezia.  GENITOURINARY: No dysuria, frequency or hematuria  NEUROLOGICAL: No numbness or weakness  SKIN: No itching, rashes  [ ] All other systems negative  [ ] Unable to assess ROS because ________    OBJECTIVE:  ICU Vital Signs Last 24 Hrs  T(C): 36.7 (19 Oct 2023 15:25), Max: 37.2 (19 Oct 2023 04:49)  T(F): 98.1 (19 Oct 2023 15:25), Max: 98.9 (19 Oct 2023 04:49)  HR: 123 (19 Oct 2023 15:25) (100 - 123)  BP: 83/28 (19 Oct 2023 15:25) (62/40 - 127/54)  BP(mean): --  ABP: --  ABP(mean): --  RR: 20 (19 Oct 2023 15:25) (18 - 20)  SpO2: 98% (19 Oct 2023 15:25) (91% - 100%)    O2 Parameters below as of 19 Oct 2023 15:25  Patient On (Oxygen Delivery Method): ventilator    O2 Concentration (%): 35      Mode: AC/ CMV (Assist Control/ Continuous Mandatory Ventilation), RR (machine): 24, FiO2: 35, PEEP: 8, ITime: 0.8, MAP: 19, PC: 32, PIP: 40    10-18 @ 07:01  -  10-19 @ 07:00  --------------------------------------------------------  IN: 1160 mL / OUT: 0 mL / NET: 1160 mL    10-19 @ 07:01  -  10-19 @ 17:02  --------------------------------------------------------  IN: 400 mL / OUT: 900 mL / NET: -500 mL      CAPILLARY BLOOD GLUCOSE      POCT Blood Glucose.: 140 mg/dL (19 Oct 2023 11:16)      PHYSICAL EXAM:  GENERAL: NAD, lying in bed comfortably  HEAD:  Atraumatic, normocephalic  EYES: EOMI, PERRLA, conjunctiva and sclera clear  ENT: Moist mucous membranes  NECK: Supple, no JVD  HEART: S1, S2, regular rate and rhythm, no murmurs, rubs, or gallops  LUNGS: Unlabored respirations.  Clear to auscultation bilaterally, no crackles, wheezing, or rhonchi  ABDOMEN: Soft, nontender, nondistended, +BS  EXTREMITIES: 2+ peripheral pulses bilaterally. No clubbing, cyanosis, or edema  NERVOUS SYSTEM:  A&Ox3, no focal deficits   SKIN: No rashes or lesions  LINES:     LABS:                        7.8    15.27 )-----------( 499      ( 19 Oct 2023 11:44 )             25.1     Hgb Trend: 7.8<--, 7.5<--, 9.0<--, 6.8<--, 7.6<--  10-19    135  |  99  |  36<H>  ----------------------------<  143<H>  5.0   |  27  |  1.63<H>    Ca    8.6      19 Oct 2023 11:44  Phos  4.1     10-19  Mg     2.10     10-19      Creatinine Trend: 1.63<--, 1.73<--, 1.71<--, 1.29<--, 1.69<--, 1.35<--    Urinalysis Basic - ( 19 Oct 2023 11:44 )    Color: x / Appearance: x / SG: x / pH: x  Gluc: 143 mg/dL / Ketone: x  / Bili: x / Urobili: x   Blood: x / Protein: x / Nitrite: x   Leuk Esterase: x / RBC: x / WBC x   Sq Epi: x / Non Sq Epi: x / Bacteria: x          ====================ASSESSMENT======================    Plan:  ====================NEUROLOGY=======================    ====================RESPIRATORY======================    ====================CARDIOVASCULAR==================    ====================/RENAL========================    ====================GI/NUTRITION=====================    ====================SKIN=============================    ====================INFECTIOUS DISEASE================    ====================ENDOCRINE=======================    ====================HEMATOLOGIC/DVT PPx=============    ====================ETHICS=========================== MICU ACCEPT NOTE    CHIEF COMPLAINT: Patient is a 73y old  Female who presents with a chief complaint of Respiratory distress (19 Oct 2023 14:56)      HPI:  72 YO F with PMHx of IDDM2, HTN, Diabetic Nephropathic CKD, HFpEF, Breast Cancer s/p BL mastectomy, chemotherapy and radiation (2018), Respiratory and Cardiac Arrest (2018), 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   While in MICU patient noted with worsening renal failure requiring HD initiation, CGE/ Melena s/p EGD 8/31 found with esophagitis and erosive gastropathy, new right cerebellar infarct and fevers second to ESBL COLI and MSSA VAP, MSSA bacteremia, left ear otitis externa and drug rxn to cephalosporins. 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 Case discussed at length renal and given good UOP attempted renal recovery, however failed, and upon reinitiation of HD attempt. R IJ SHILEY failed. Attempted volume removal with Bumex GTT however course complicated by hypotension requiring transfer back to MICU 9/26 for pressor support. Diuresis dc'ed, pressors weaned off and stress steroids started. L IJ SHILEY placed (9/30) and HD reinstated. Course further complicated by AOCD and  PSA and Proteus VAP. Patient transferred back to RCU 10/1 with course complicated by volume overload and grossly resistant organisms. Course further c/b intermittent persistent fevers and allergic transfusion reaction s/p PRBC transfusion (10/15).     On 10/19, pt had progressive hypotension post HD already on midodrine and droxidopa. GOC discussion reinitiated with family who again stated they want everything done for the patient. Pt will be transferred to MICU for vasopressor support while on HD.     PAST MEDICAL & SURGICAL HISTORY:   Breast CA      Diabetes      Stroke      Cardiac arrest      HTN (hypertension)      H/O mastectomy, bilateral          FAMILY HISTORY:  No pertinent family history in first degree relatives        SOCIAL HISTORY:  Smoking:   Substance Use:   EtOH Use:   Advance Directives:     MEDICATIONS  (STANDING):  albuterol    0.083% 2.5 milliGRAM(s) Nebulizer every 6 hours  apixaban 5 milliGRAM(s) Oral every 12 hours  artificial tears (preservative free) Ophthalmic Solution 1 Drop(s) Both EYES every 4 hours  atorvastatin 10 milliGRAM(s) Oral at bedtime  aztreonam  IVPB 2000 milliGRAM(s) IV Intermittent <User Schedule>  chlorhexidine 0.12% Liquid 15 milliLiter(s) Oral Mucosa every 12 hours  chlorhexidine 2% Cloths 1 Application(s) Topical daily  dextrose 5%. 1000 milliLiter(s) (50 mL/Hr) IV Continuous <Continuous>  dextrose 5%. 1000 milliLiter(s) (100 mL/Hr) IV Continuous <Continuous>  dextrose 50% Injectable 12.5 Gram(s) IV Push once  dextrose 50% Injectable 25 Gram(s) IV Push once  dextrose 50% Injectable 25 Gram(s) IV Push once  dextrose 50% Injectable 25 Gram(s) IV Push once  droxidopa 100 milliGRAM(s) Oral three times a day  epoetin odalis (EPOGEN) Injectable 94122 Unit(s) IV Push <User Schedule>  ferrous    sulfate Liquid 300 milliGRAM(s) Oral daily  glucagon  Injectable 1 milliGRAM(s) IntraMuscular once  insulin lispro (ADMELOG) corrective regimen sliding scale   SubCutaneous every 6 hours  insulin NPH human recombinant 6 Unit(s) SubCutaneous every 6 hours  levETIRAcetam  Solution 1000 milliGRAM(s) Oral daily  levETIRAcetam  Solution 250 milliGRAM(s) Oral <User Schedule>  midodrine. 30 milliGRAM(s) Oral every 8 hours  pantoprazole  Injectable 40 milliGRAM(s) IV Push two times a day  petrolatum Ophthalmic Ointment 1 Application(s) Both EYES four times a day  sodium chloride 1 Gram(s) Oral two times a day    MEDICATIONS  (PRN):  acetaminophen   Oral Liquid .. 650 milliGRAM(s) Oral every 6 hours PRN Temp greater or equal to 38C (100.4F), Mild Pain (1 - 3)  dextrose Oral Gel 15 Gram(s) Oral once PRN Blood Glucose LESS THAN 70 milliGRAM(s)/deciliter  diphenhydrAMINE Elixir 50 milliGRAM(s) Oral once PRN Rash and/or Itching  sodium chloride 0.9% Bolus. 250 milliLiter(s) IV Bolus every 5 minutes PRN SBP LESS THAN or EQUAL to 90 mmHg  sodium chloride 0.9% lock flush 10 milliLiter(s) IV Push every 1 hour PRN Pre/post blood products, medications, blood draw, and to maintain line patency      Allergies    isoniazid (Rash)  nafcillin (Unknown)  hydrALAZINE (Rash)  vitamin E (Short breath; Urticaria; Hives)  doxycycline (Rash)  cefepime (Rash)  NIFEdipine (Urticaria; Hives)  Zosyn (Rash)    Intolerances          OBJECTIVE:  ICU Vital Signs Last 24 Hrs  T(C): 36.7 (19 Oct 2023 15:25), Max: 37.2 (19 Oct 2023 04:49)  T(F): 98.1 (19 Oct 2023 15:25), Max: 98.9 (19 Oct 2023 04:49)  HR: 123 (19 Oct 2023 15:25) (100 - 123)  BP: 83/28 (19 Oct 2023 15:25) (62/40 - 127/54)  BP(mean): --  ABP: --  ABP(mean): --  RR: 20 (19 Oct 2023 15:25) (18 - 20)  SpO2: 98% (19 Oct 2023 15:25) (91% - 100%)    O2 Parameters below as of 19 Oct 2023 15:25  Patient On (Oxygen Delivery Method): ventilator    O2 Concentration (%): 35      Mode: AC/ CMV (Assist Control/ Continuous Mandatory Ventilation), RR (machine): 24, FiO2: 35, PEEP: 8, ITime: 0.8, MAP: 19, PC: 32, PIP: 40    10-18 @ 07:01  -  10-19 @ 07:00  --------------------------------------------------------  IN: 1160 mL / OUT: 0 mL / NET: 1160 mL    10-19 @ 07:01  -  10-19 @ 17:02  --------------------------------------------------------  IN: 400 mL / OUT: 900 mL / NET: -500 mL      CAPILLARY BLOOD GLUCOSE      POCT Blood Glucose.: 140 mg/dL (19 Oct 2023 11:16)        LABS:                        7.8    15.27 )-----------( 499      ( 19 Oct 2023 11:44 )             25.1     Hgb Trend: 7.8<--, 7.5<--, 9.0<--, 6.8<--, 7.6<--  10-19    135  |  99  |  36<H>  ----------------------------<  143<H>  5.0   |  27  |  1.63<H>    Ca    8.6      19 Oct 2023 11:44  Phos  4.1     10-19  Mg     2.10     10-19      Creatinine Trend: 1.63<--, 1.73<--, 1.71<--, 1.29<--, 1.69<--, 1.35<--    Urinalysis Basic - ( 19 Oct 2023 11:44 )    Color: x / Appearance: x / SG: x / pH: x  Gluc: 143 mg/dL / Ketone: x  / Bili: x / Urobili: x   Blood: x / Protein: x / Nitrite: x   Leuk Esterase: x / RBC: x / WBC x   Sq Epi: x / Non Sq Epi: x / Bacteria: x          ====================ASSESSMENT======================  72 YO F with PMHx of IDDM2, HTN, Diabetic Nephropathic CKD, HFpEF, Breast Cancer s/p BL mastectomy, chemotherapy and radiation (2018), Respiratory and Cardiac Arrest (2018), 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 Patient transferred back to RCU 10/1 with course complicated by volume overload and grossly resistant organisms. Course further c/b intermittent persistent fevers and allergic transfusion reaction s/p PRBC transfusion (10/15).       Plan:  ====================NEUROLOGY=======================    ====================RESPIRATORY======================    ====================CARDIOVASCULAR==================    ====================/RENAL========================    ====================GI/NUTRITION=====================    ====================SKIN=============================    ====================INFECTIOUS DISEASE================    ====================ENDOCRINE=======================    ====================HEMATOLOGIC/DVT PPx=============    ====================ETHICS=========================== MICU ACCEPT NOTE    CHIEF COMPLAINT: Patient is a 73y old  Female who presents with a chief complaint of Respiratory distress (19 Oct 2023 14:56)      HPI:  74 YO F with PMHx of IDDM2, HTN, Diabetic Nephropathic CKD, HFpEF, Breast Cancer s/p BL mastectomy, chemotherapy and radiation (2018), Respiratory and Cardiac Arrest (2018), 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   While in MICU patient noted with worsening renal failure requiring HD initiation, CGE/ Melena s/p EGD 8/31 found with esophagitis and erosive gastropathy, new right cerebellar infarct and fevers second to ESBL COLI and MSSA VAP, MSSA bacteremia, left ear otitis externa and drug rxn to cephalosporins. 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 Case discussed at length renal and given good UOP attempted renal recovery, however failed, and upon reinitiation of HD attempt. R IJ SHILEY failed. Attempted volume removal with Bumex GTT however course complicated by hypotension requiring transfer back to MICU 9/26 for pressor support. Diuresis dc'ed, pressors weaned off and stress steroids started. L IJ SHILEY placed (9/30) and HD reinstated. Course further complicated by AOCD and  PSA and Proteus VAP. Patient transferred back to RCU 10/1 with course complicated by volume overload and grossly resistant organisms. Course further c/b intermittent persistent fevers and allergic transfusion reaction s/p PRBC transfusion (10/15).     On 10/19, pt had progressive hypotension post HD already on midodrine and droxidopa. GOC discussion reinitiated with family who again stated they want everything done for the patient. Pt will be transferred to MICU for vasopressor support while on HD.     PAST MEDICAL & SURGICAL HISTORY:   Breast CA      Diabetes      Stroke      Cardiac arrest      HTN (hypertension)      H/O mastectomy, bilateral          FAMILY HISTORY:  No pertinent family history in first degree relatives        SOCIAL HISTORY:  Smoking:   Substance Use:   EtOH Use:   Advance Directives:     MEDICATIONS  (STANDING):  albuterol    0.083% 2.5 milliGRAM(s) Nebulizer every 6 hours  apixaban 5 milliGRAM(s) Oral every 12 hours  artificial tears (preservative free) Ophthalmic Solution 1 Drop(s) Both EYES every 4 hours  atorvastatin 10 milliGRAM(s) Oral at bedtime  aztreonam  IVPB 2000 milliGRAM(s) IV Intermittent <User Schedule>  chlorhexidine 0.12% Liquid 15 milliLiter(s) Oral Mucosa every 12 hours  chlorhexidine 2% Cloths 1 Application(s) Topical daily  dextrose 5%. 1000 milliLiter(s) (50 mL/Hr) IV Continuous <Continuous>  dextrose 5%. 1000 milliLiter(s) (100 mL/Hr) IV Continuous <Continuous>  dextrose 50% Injectable 12.5 Gram(s) IV Push once  dextrose 50% Injectable 25 Gram(s) IV Push once  dextrose 50% Injectable 25 Gram(s) IV Push once  dextrose 50% Injectable 25 Gram(s) IV Push once  droxidopa 100 milliGRAM(s) Oral three times a day  epoetin odalis (EPOGEN) Injectable 58926 Unit(s) IV Push <User Schedule>  ferrous    sulfate Liquid 300 milliGRAM(s) Oral daily  glucagon  Injectable 1 milliGRAM(s) IntraMuscular once  insulin lispro (ADMELOG) corrective regimen sliding scale   SubCutaneous every 6 hours  insulin NPH human recombinant 6 Unit(s) SubCutaneous every 6 hours  levETIRAcetam  Solution 1000 milliGRAM(s) Oral daily  levETIRAcetam  Solution 250 milliGRAM(s) Oral <User Schedule>  midodrine. 30 milliGRAM(s) Oral every 8 hours  pantoprazole  Injectable 40 milliGRAM(s) IV Push two times a day  petrolatum Ophthalmic Ointment 1 Application(s) Both EYES four times a day  sodium chloride 1 Gram(s) Oral two times a day    MEDICATIONS  (PRN):  acetaminophen   Oral Liquid .. 650 milliGRAM(s) Oral every 6 hours PRN Temp greater or equal to 38C (100.4F), Mild Pain (1 - 3)  dextrose Oral Gel 15 Gram(s) Oral once PRN Blood Glucose LESS THAN 70 milliGRAM(s)/deciliter  diphenhydrAMINE Elixir 50 milliGRAM(s) Oral once PRN Rash and/or Itching  sodium chloride 0.9% Bolus. 250 milliLiter(s) IV Bolus every 5 minutes PRN SBP LESS THAN or EQUAL to 90 mmHg  sodium chloride 0.9% lock flush 10 milliLiter(s) IV Push every 1 hour PRN Pre/post blood products, medications, blood draw, and to maintain line patency      Allergies    isoniazid (Rash)  nafcillin (Unknown)  hydrALAZINE (Rash)  vitamin E (Short breath; Urticaria; Hives)  doxycycline (Rash)  cefepime (Rash)  NIFEdipine (Urticaria; Hives)  Zosyn (Rash)    Intolerances          OBJECTIVE:  ICU Vital Signs Last 24 Hrs  T(C): 36.7 (19 Oct 2023 15:25), Max: 37.2 (19 Oct 2023 04:49)  T(F): 98.1 (19 Oct 2023 15:25), Max: 98.9 (19 Oct 2023 04:49)  HR: 123 (19 Oct 2023 15:25) (100 - 123)  BP: 83/28 (19 Oct 2023 15:25) (62/40 - 127/54)  BP(mean): --  ABP: --  ABP(mean): --  RR: 20 (19 Oct 2023 15:25) (18 - 20)  SpO2: 98% (19 Oct 2023 15:25) (91% - 100%)    O2 Parameters below as of 19 Oct 2023 15:25  Patient On (Oxygen Delivery Method): ventilator    O2 Concentration (%): 35      Mode: AC/ CMV (Assist Control/ Continuous Mandatory Ventilation), RR (machine): 24, FiO2: 35, PEEP: 8, ITime: 0.8, MAP: 19, PC: 32, PIP: 40    10-18 @ 07:01  -  10-19 @ 07:00  --------------------------------------------------------  IN: 1160 mL / OUT: 0 mL / NET: 1160 mL    10-19 @ 07:01  -  10-19 @ 17:02  --------------------------------------------------------  IN: 400 mL / OUT: 900 mL / NET: -500 mL      CAPILLARY BLOOD GLUCOSE      POCT Blood Glucose.: 140 mg/dL (19 Oct 2023 11:16)        LABS:                        7.8    15.27 )-----------( 499      ( 19 Oct 2023 11:44 )             25.1     Hgb Trend: 7.8<--, 7.5<--, 9.0<--, 6.8<--, 7.6<--  10-19    135  |  99  |  36<H>  ----------------------------<  143<H>  5.0   |  27  |  1.63<H>    Ca    8.6      19 Oct 2023 11:44  Phos  4.1     10-19  Mg     2.10     10-19      Creatinine Trend: 1.63<--, 1.73<--, 1.71<--, 1.29<--, 1.69<--, 1.35<--    Urinalysis Basic - ( 19 Oct 2023 11:44 )    Color: x / Appearance: x / SG: x / pH: x  Gluc: 143 mg/dL / Ketone: x  / Bili: x / Urobili: x   Blood: x / Protein: x / Nitrite: x   Leuk Esterase: x / RBC: x / WBC x   Sq Epi: x / Non Sq Epi: x / Bacteria: x          ====================ASSESSMENT======================  74 YO F with PMHx of IDDM2, HTN, Diabetic Nephropathic CKD, HFpEF, Breast Cancer s/p BL mastectomy, chemotherapy and radiation (2018), Respiratory and Cardiac Arrest (2018), 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       Plan:  NEUROLOGY  # NEW cerebella infarct   - Baseline MS AOX3 with short term memory changes per family however noted with concern for poor mentation in ICU  - MRI (8/17) with NEW right cerebellar infarct and old left PCA/occipital infarcts  - STEPHANIE (8/17) with AV showing two linear mobile echogenic elements attached to valve leaflets consistent with Lambel's excrescence, but no TRINITY thrombus, and lambel's excrescence with uncertain clinical implication.   - EEG (8/11-8/15) with no seizures   - ILR interrogation (9/7) with SR and PACs falsely recorded as AF.   - Continue on ASA and Lipitor for medical management     # Seizures   - Course complicated by questionable head and body shaking with prolactin elevated 9/8. Discussed for spot EEG however held given low likelihood of seizures noted and acute respiratory illnesses   - Course further complicated with right arm twitching and RPT EEG (10/4) with no seizure however but noted with increase seizure potential in left posterior quadrants.   - RPT EEG (10/5) with possible focal seizures and risk of focal-onset seizures from multiple locations, most prominent in the left posterior temporal/posterocentral region  - RPT EEG (10/6) with RUE twitching are likely non-epileptic in nature, however risk of focal-onset seizures from multiple locations  - Continue on Keppra PPX     # Metabolic vs Uremic Encephalopathy   - Lethargy noted with progression to stupor thought to be second to uremia.   - RPT CTH (9/26) with vague areas of hypoattenuation within the bilateral cerebellar hemispheres which may be compatible with acute/subacute ischemia.   - Discussed CTH with neurology and no signs of new infarct noted.   - HD reinstated in ICU with improvement in uremia however mentation remained   - Poor mentation likely in setting of toxic encephalopathy in setting of infections.   - Pending MRI BRAIN    CARDIOVASCULAR  # HTN Urgency   # Septic vs vasoplegic shock   - Labile BPs ranging in between SBP 80s-200s and attempted labetalol, however noted with hypotension and bradycardia likely from BB toxicity vs shock state?    - Holding Labetalol, Catapres and Catapres.    - Attempted volume removal with bumex diuresis however noted with return of shock state requiring pressor support and transfer back to ICU.   - Pressors weaned off to stress dose (last day 10/4) and Midodrine   - Continue on Midodrine 30 TID (9/29 - ) however continued labile BPs with difficult HD sessions. Droxiopa 100 TID added (10/18 - )   - Ensure Midodrine and Droxidopa are timed 30-60 minutes prior to HD  - DO NOT HOLD DROXIDOPA AND MIDODRINE     # Hx of HFpEF with likely ADHF with volume overload.   - ECHO (7/2023) with EF 59 with severe LVH and diastolic dysfunction   - STEPHANIE (8/18) with normal LVRVSF however noted with increased LA pressures and persistent LA septal bowing into RA.   - ECHO (8/11) with EF 60 with mild LVH, normal LVRVSF, and mild ddfxn.  - Grossly volume overloaded and removing volume with HD midodrine assisted sessions     HEENT   # Left ear OE with acute on chronic left-sided otomastoiditis  - ENT evaluation (9/7) with no mastoid tenderness, however found with positive swelling and excoriation to left auricle and tenderness to manipulation of auricle. Debrided crusting of auricle and EAC evaluated with edema and unable to visualize TM. EAC debrided and found with watery exudate.   - CT IAC with acute on chronic left-sided otitis externa and acute on chronic left-sided otomastoiditis. Superimposed left-sided tympanosclerosis. Superimposed cholesteatoma formation within the left tympanomastoid cavity cannot be excluded  - Completed CiproHC (9/5 - 9/16), Bradford (9/1-10/1) and acetic acid and bacitracin.   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   - Continue on empiric valtrex 500mg BID (9/29 - 10/8) per ophtho and ID  - Continue preservative free artificial tears 4 times a day in the both eye  - Continue lacrilube ointment twice a day in the both eyes     # Oral Lesions with questionable Zoster vs Trauma?   - Patient with tongue pain and seen with anterior ulcerations consolidating and crusting and posterior ulceration.  - Case discussed with pathology resident and culture/ cytology sent.   - HSV negative and Path (9/6) with normal appearing epithelial cells singly and in clusters devoid of viral cytopathic effect.   - Continue on magic mouth wash for pain    RESPIRATORY  # AHHRF second to volume overload   - Hx of CKD and HFpEF presented with SOB and hypercarbia second to volume overload requiring intubation and HD initiation   - Vent weaning attempted however limited requiring tracheostomy (9/1) with IP   - Course complicated by PIPs elevated and found with tracheomalacia and volume overloaded.   - CT CHEST (9/8) with tracheomalacia, BL pleural effusions and continued consolidations   - Continue on vent and given TBM increased PEEP to 8 with improvement in dynamic collapse, but PIP waxes and wanes with volume overloaded and secretions.  - Changed vent to PC 24/35/8/40 with slight improvement in PIP (40s)   - Continue on albuterol and chest PT  - Continue volume removal with HD   - Attempted HTS but noted with hemoptysis and held   - Hold Atrovent given thick secretions   - Hold IPV given high inspiratory pressures      GI  # UGIB   - CGE x 1 episode on 8/24 and melena x 2 episodes on 8/31 likely residual blood.   - EGD (8/31) found with esophagitis and erosive gastropathy  - Continue on PPI BID through late October and then PPI QD     # Dysphagia   - NGT-TF continued   - Pending PEG however needs improvement prior to placement.     RENAL  # Progressive CKD   - Presented volume overload and progressive RUSS on CKD   - POCUS with no signs of hydronephrosis in ICU  - Pressor support started with improvement in renal function in ICU  - Attempted steroid course in ICU without improvement in renal function   - Case discussed at length with renal and initiated on HD in ICU   - Remain on HD while in RCU however noted to be volume overloaded. Attempted Bumex pushes renal recovery and patient noted with good UOP, but BUN/ CRE continued to rise without improvement on diuresis.   - Ultimately resumed on HD MWF TIW with midodrine assisted volume removal, however pressure remains soft with difficulty removing aggressive fluids.   - Added further pressor support as above and will continue to attempt HD/ UF     # Hyponatremia   - Continue on salt tabs 1G  (decreased 10/6) and weaning off as tolerated with volume removal. Monitor sodium     # Hyperphosphatemia to Hypophosphatemia with HD  - PTH normal and elevated phos likely from renal failure  - Phos binder with Renvela started with improvement however then noted with hypophosphatemia and Renvela stopped.     INFECTIOUS DISEASE  # ESBL ECOLI and MSSA VAP c/b MSSA bacteremia   - RVP/ COVID (8/11) negative   - SCx (8/11) with MSSA s/p cefepime (8/12-8/13) and then ancef (8/14) however noted with drug reaction and then changed to vanco and aztreonam (8/12-8/13) in ICU .   - Course complicated by recurrent fevers and return of MSSA with bacteremia.   - SCx (9/1) with MSSA and ESBL ECOLI   - BAL (9/1) with MSSA   - BCx (9/1) with MSSA and cleared on (9/4)   - ECHO (9/6) unable to rule out endocarditis   - Continue on Vanco (9/4-9/22) however noted with rashes of uncertain etiology and replaced with Bradford (9/4 - 10/2) and DAPTO (9/26-10/2) for  completion.     # Proteus and  PSA VAP/ Trachitis   - Continued fevers and SCx (9/29) with MDR  PSA and Proteus   - Continued on Bradford as above however noted with resistance and changed to Zosyn (10/2 - 10/5) with course complicated by possible drug rxn. Zosyn changed to Aztreonam (10/5 - 10/6) however RPT SCx (10/3) with  PSA however only intermediate coverage to aztreonam and amikacin.  PSA grossly resistant to other antibiotics other than cephalosporins however patient with reactions in the past. Case discussed with ID and ID pharmacist and change to Polymyxin (10/6 - 10/15) however continues to spike fevers likely second to Polymyxin only intermediate coverage and Recarbio resistant .   - Return of fever and RPT Sputum (10/14) wtith  PSA now sensitive on Aztreonam (10/16 - 10/25) x 10 days.   - Overall difficulty with ABX tailoring given allergic rxns and resistant organisms.     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  - HH remained labile requiring xfusion (10/15) however noted with questionable reaction with rash and BB called. Work up being sent.   - Monitor HH     VASCULAR   # LLE POP DVT   - US BL LE (9/10) with acute left deep vein thrombosis of the left popliteal vein.  - RUE swollen and VA DOPPLER RUE (10/3) with R IJ DVT and R brachial DVT.  - Eliquis held for permacath however procedure held.   - Eliquis remains on hold second to mild suction trauma hemoptysis.   - Resume Eliquis when able.      # HD access  - L IJ CLAUDIA (9/27 - )   - Planned for IR permacath cancelled for today and will reattempt when infectious status improves.     ONC   # Hx of Breast CA   - Patient dx in 2018 and s/p BL mastectomy (radical on right) and chemo and RT.   - Supportive care.     ENDOCRINE  # IDDM2   - Continued on NPH with ISS, however noted with hypoglycemic episodes and NPH dc'ed.    - Finger sticks trending up off NPH.   - Continue on NPH 6U with moderate ISS.   - Adjust as need     SKIN  # Drug Eruption   - Attempted cefepime (8/12) vs ancef (8/13-8/14) and then noted with drug rxn in ICU. Continue on steroids with prednisone 40 (8/18 - 9/2) then prednisone 30 (9/3-9/7), then prednisone 20 (9/8 - 9/10), then prednisone 10mg (9/11-9/13) then turn off.   - Attempted Zosyn (10/2-10/5) with possible rash. Zosyn turned off with improvement.   - Hold further cephalosporins or PCNs at this time   - Monitor for further drug rxns.     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   While in MICU patient noted with worsening renal failure requiring HD initiation, CGE/ Melena s/p EGD 8/31 found with esophagitis and erosive gastropathy, new right cerebellar infarct and fevers second to ESBL COLI and MSSA VAP, MSSA bacteremia, left ear otitis externa and drug rxn to cephalosporins. 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 Case discussed at length renal and given good UOP attempted renal recovery, however failed, and upon reinitiation of HD attempt. R IJ SHILEY failed. Attempted volume removal with Bumex GTT however course complicated by hypotension requiring transfer back to MICU 9/26 for pressor support. Diuresis dc'ed, pressors weaned off and stress steroids started. L IJ SHILEY placed (9/30) and HD reinstated. Course further complicated by AOCD and  PSA and Proteus VAP. Patient transferred back to RCU 10/1 with course complicated by volume overload and grossly resistant organisms. Course further c/b intermittent persistent fevers and allergic transfusion reaction s/p PRBC transfusion (10/15).     On 10/19, pt had progressive hypotension post HD already on midodrine and droxidopa. GOC discussion reinitiated with family who again stated they want everything done for the patient. Pt will be transferred to MICU for vasopressor support while on HD.     PAST MEDICAL & SURGICAL HISTORY:   Breast CA      Diabetes      Stroke      Cardiac arrest      HTN (hypertension)      H/O mastectomy, bilateral          FAMILY HISTORY:  No pertinent family history in first degree relatives        SOCIAL HISTORY:  Smoking:   Substance Use:   EtOH Use:   Advance Directives:     MEDICATIONS  (STANDING):  albuterol    0.083% 2.5 milliGRAM(s) Nebulizer every 6 hours  apixaban 5 milliGRAM(s) Oral every 12 hours  artificial tears (preservative free) Ophthalmic Solution 1 Drop(s) Both EYES every 4 hours  atorvastatin 10 milliGRAM(s) Oral at bedtime  aztreonam  IVPB 2000 milliGRAM(s) IV Intermittent <User Schedule>  chlorhexidine 0.12% Liquid 15 milliLiter(s) Oral Mucosa every 12 hours  chlorhexidine 2% Cloths 1 Application(s) Topical daily  dextrose 5%. 1000 milliLiter(s) (50 mL/Hr) IV Continuous <Continuous>  dextrose 5%. 1000 milliLiter(s) (100 mL/Hr) IV Continuous <Continuous>  dextrose 50% Injectable 12.5 Gram(s) IV Push once  dextrose 50% Injectable 25 Gram(s) IV Push once  dextrose 50% Injectable 25 Gram(s) IV Push once  dextrose 50% Injectable 25 Gram(s) IV Push once  droxidopa 100 milliGRAM(s) Oral three times a day  epoetin odalis (EPOGEN) Injectable 96925 Unit(s) IV Push <User Schedule>  ferrous    sulfate Liquid 300 milliGRAM(s) Oral daily  glucagon  Injectable 1 milliGRAM(s) IntraMuscular once  insulin lispro (ADMELOG) corrective regimen sliding scale   SubCutaneous every 6 hours  insulin NPH human recombinant 6 Unit(s) SubCutaneous every 6 hours  levETIRAcetam  Solution 1000 milliGRAM(s) Oral daily  levETIRAcetam  Solution 250 milliGRAM(s) Oral <User Schedule>  midodrine. 30 milliGRAM(s) Oral every 8 hours  pantoprazole  Injectable 40 milliGRAM(s) IV Push two times a day  petrolatum Ophthalmic Ointment 1 Application(s) Both EYES four times a day  sodium chloride 1 Gram(s) Oral two times a day    MEDICATIONS  (PRN):  acetaminophen   Oral Liquid .. 650 milliGRAM(s) Oral every 6 hours PRN Temp greater or equal to 38C (100.4F), Mild Pain (1 - 3)  dextrose Oral Gel 15 Gram(s) Oral once PRN Blood Glucose LESS THAN 70 milliGRAM(s)/deciliter  diphenhydrAMINE Elixir 50 milliGRAM(s) Oral once PRN Rash and/or Itching  sodium chloride 0.9% Bolus. 250 milliLiter(s) IV Bolus every 5 minutes PRN SBP LESS THAN or EQUAL to 90 mmHg  sodium chloride 0.9% lock flush 10 milliLiter(s) IV Push every 1 hour PRN Pre/post blood products, medications, blood draw, and to maintain line patency      Allergies    isoniazid (Rash)  nafcillin (Unknown)  hydrALAZINE (Rash)  vitamin E (Short breath; Urticaria; Hives)  doxycycline (Rash)  cefepime (Rash)  NIFEdipine (Urticaria; Hives)  Zosyn (Rash)    Intolerances          OBJECTIVE:  ICU Vital Signs Last 24 Hrs  T(C): 36.7 (19 Oct 2023 15:25), Max: 37.2 (19 Oct 2023 04:49)  T(F): 98.1 (19 Oct 2023 15:25), Max: 98.9 (19 Oct 2023 04:49)  HR: 123 (19 Oct 2023 15:25) (100 - 123)  BP: 83/28 (19 Oct 2023 15:25) (62/40 - 127/54)  BP(mean): --  ABP: --  ABP(mean): --  RR: 20 (19 Oct 2023 15:25) (18 - 20)  SpO2: 98% (19 Oct 2023 15:25) (91% - 100%)    O2 Parameters below as of 19 Oct 2023 15:25  Patient On (Oxygen Delivery Method): ventilator    O2 Concentration (%): 35      Mode: AC/ CMV (Assist Control/ Continuous Mandatory Ventilation), RR (machine): 24, FiO2: 35, PEEP: 8, ITime: 0.8, MAP: 19, PC: 32, PIP: 40    10-18 @ 07:01  -  10-19 @ 07:00  --------------------------------------------------------  IN: 1160 mL / OUT: 0 mL / NET: 1160 mL    10-19 @ 07:01  -  10-19 @ 17:02  --------------------------------------------------------  IN: 400 mL / OUT: 900 mL / NET: -500 mL      CAPILLARY BLOOD GLUCOSE      POCT Blood Glucose.: 140 mg/dL (19 Oct 2023 11:16)        LABS:                        7.8    15.27 )-----------( 499      ( 19 Oct 2023 11:44 )             25.1     Hgb Trend: 7.8<--, 7.5<--, 9.0<--, 6.8<--, 7.6<--  10-19    135  |  99  |  36<H>  ----------------------------<  143<H>  5.0   |  27  |  1.63<H>    Ca    8.6      19 Oct 2023 11:44  Phos  4.1     10-19  Mg     2.10     10-19      Creatinine Trend: 1.63<--, 1.73<--, 1.71<--, 1.29<--, 1.69<--, 1.35<--    Urinalysis Basic - ( 19 Oct 2023 11:44 )    Color: x / Appearance: x / SG: x / pH: x  Gluc: 143 mg/dL / Ketone: x  / Bili: x / Urobili: x   Blood: x / Protein: x / Nitrite: x   Leuk Esterase: x / RBC: x / WBC x   Sq Epi: x / Non Sq Epi: x / Bacteria: x          ====================ASSESSMENT======================  74 YO F with PMHx of IDDM2, HTN, Diabetic Nephropathic CKD, HFpEF, Breast Cancer s/p BL mastectomy, chemotherapy and radiation (2018), Respiratory and Cardiac Arrest (2018), 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       Plan:  NEUROLOGY  # NEW cerebella infarct   - Baseline MS AOX3 with short term memory changes per family however noted with concern for poor mentation in ICU  - MRI (8/17) with NEW right cerebellar infarct and old left PCA/occipital infarcts  - STEPHANIE (8/17) with AV showing two linear mobile echogenic elements attached to valve leaflets consistent with Lambel's excrescence, but no TRINITY thrombus, and lambel's excrescence with uncertain clinical implication.   - EEG (8/11-8/15) with no seizures   - ILR interrogation (9/7) with SR and PACs falsely recorded as AF.   - Continue on ASA and Lipitor for medical management     # Seizures   - Course complicated by questionable head and body shaking with prolactin elevated 9/8. Discussed for spot EEG however held given low likelihood of seizures noted and acute respiratory illnesses   - Course further complicated with right arm twitching and RPT EEG (10/4) with no seizure however but noted with increase seizure potential in left posterior quadrants.   - RPT EEG (10/5) with possible focal seizures and risk of focal-onset seizures from multiple locations, most prominent in the left posterior temporal/posterocentral region  - RPT EEG (10/6) with RUE twitching are likely non-epileptic in nature, however risk of focal-onset seizures from multiple locations  - Continue on Keppra PPX     # Metabolic vs Uremic Encephalopathy   - Lethargy noted with progression to stupor thought to be second to uremia.   - RPT CTH (9/26) with vague areas of hypoattenuation within the bilateral cerebellar hemispheres which may be compatible with acute/subacute ischemia.   - Discussed CTH with neurology and no signs of new infarct noted.   - HD reinstated in ICU with improvement in uremia however mentation remained   - Poor mentation likely in setting of toxic encephalopathy in setting of infections.     CARDIOVASCULAR  # HTN Urgency   # Septic vs vasoplegic shock   - Labile BPs ranging in between SBP 80s-200s and attempted labetalol, however noted with hypotension and bradycardia likely from BB toxicity vs shock state?    - Holding Labetalol, Catapres and Catapres.    - Attempted volume removal with bumex diuresis however noted with return of shock state requiring pressor support and transfer back to ICU.   - Pressors weaned off to stress dose (last day 10/4) and Midodrine   - Continue on Midodrine 30 TID (9/29 - ) however continued labile BPs with difficult HD sessions. Droxiopa 100 TID added (10/18 - )   - Ensure Midodrine and Droxidopa are timed 30-60 minutes prior to HD  - DO NOT HOLD DROXIDOPA AND MIDODRINE     - Pt progressively hypotensive during HD on oral pressors  - Will require IV pressor for HD, CAP at 1 pressor    # Hx of HFpEF with likely ADHF with volume overload.   - ECHO (7/2023) with EF 59 with severe LVH and diastolic dysfunction   - STEPHANIE (8/18) with normal LVRVSF however noted with increased LA pressures and persistent LA septal bowing into RA.   - ECHO (8/11) with EF 60 with mild LVH, normal LVRVSF, and mild ddfxn.  - Grossly volume overloaded and removing volume with HD midodrine assisted sessions     HEENT   # Left ear OE with acute on chronic left-sided otomastoiditis  - ENT evaluation (9/7) with no mastoid tenderness, however found with positive swelling and excoriation to left auricle and tenderness to manipulation of auricle. Debrided crusting of auricle and EAC evaluated with edema and unable to visualize TM. EAC debrided and found with watery exudate.   - CT IAC with acute on chronic left-sided otitis externa and acute on chronic left-sided otomastoiditis. Superimposed left-sided tympanosclerosis. Superimposed cholesteatoma formation within the left tympanomastoid cavity cannot be excluded  - Completed CiproHC (9/5 - 9/16), Bradford (9/1-10/1) and acetic acid and bacitracin.   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   - Continue on empiric valtrex 500mg BID (9/29 - 10/8) per ophtho and ID  - Continue preservative free artificial tears 4 times a day in the both eye  - Continue lacrilube ointment twice a day in the both eyes     # Oral Lesions with questionable Zoster vs Trauma?   - Patient with tongue pain and seen with anterior ulcerations consolidating and crusting and posterior ulceration.  - Case discussed with pathology resident and culture/ cytology sent.   - HSV negative and Path (9/6) with normal appearing epithelial cells singly and in clusters devoid of viral cytopathic effect.   - Continue on magic mouth wash for pain    RESPIRATORY  # AHHRF second to volume overload   - Hx of CKD and HFpEF presented with SOB and hypercarbia second to volume overload requiring intubation and HD initiation   - Vent weaning attempted however limited requiring tracheostomy (9/1) with IP   - Course complicated by PIPs elevated and found with tracheomalacia and volume overloaded.   - CT CHEST (9/8) with tracheomalacia, BL pleural effusions and continued consolidations   - Continue on vent and given TBM increased PEEP to 8 with improvement in dynamic collapse, but PIP waxes and wanes with volume overloaded and secretions.  - Changed vent to PC 24/35/8/40 with slight improvement in PIP (40s)   - Continue on albuterol and chest PT  - Continue volume removal with HD   - Attempted HTS but noted with hemoptysis and held   - Hold Atrovent given thick secretions   - Hold IPV given high inspiratory pressures      GI  # UGIB   - CGE x 1 episode on 8/24 and melena x 2 episodes on 8/31 likely residual blood.   - EGD (8/31) found with esophagitis and erosive gastropathy  - Continue on PPI BID through late October and then PPI QD     # Dysphagia   - NGT-TF continued   - Pending PEG however needs improvement prior to placement.     RENAL  # Progressive CKD   - Presented volume overload and progressive RUSS on CKD   - POCUS with no signs of hydronephrosis in ICU  - Pressor support started with improvement in renal function in ICU  - Attempted steroid course in ICU without improvement in renal function   - Case discussed at length with renal and initiated on HD in ICU   - Remain on HD while in RCU however noted to be volume overloaded. Attempted Bumex pushes renal recovery and patient noted with good UOP, but BUN/ CRE continued to rise without improvement on diuresis.   - Ultimately resumed on HD MWF TIW with midodrine assisted volume removal, however pressure remains soft with difficulty removing aggressive fluids.   - Added further pressor support as above and will continue to attempt HD/ UF     - Needs IV pressor for HD, will cap at 1 pressor    # Hyponatremia   - Continue on salt tabs 1G  (decreased 10/6) and weaning off as tolerated with volume removal. Monitor sodium     # Hyperphosphatemia to Hypophosphatemia with HD  - PTH normal and elevated phos likely from renal failure  - Phos binder with Renvela started with improvement however then noted with hypophosphatemia and Renvela stopped.     INFECTIOUS DISEASE  # ESBL ECOLI and MSSA VAP c/b MSSA bacteremia   - RVP/ COVID (8/11) negative   - SCx (8/11) with MSSA s/p cefepime (8/12-8/13) and then ancef (8/14) however noted with drug reaction and then changed to vanco and aztreonam (8/12-8/13) in ICU .   - Course complicated by recurrent fevers and return of MSSA with bacteremia.   - SCx (9/1) with MSSA and ESBL ECOLI   - BAL (9/1) with MSSA   - BCx (9/1) with MSSA and cleared on (9/4)   - ECHO (9/6) unable to rule out endocarditis   - Continue on Vanco (9/4-9/22) however noted with rashes of uncertain etiology and replaced with Bradford (9/4 - 10/2) and DAPTO (9/26-10/2) for  completion.     # Proteus and  PSA VAP/ Trachitis   - Continued fevers and SCx (9/29) with MDR  PSA and Proteus   - Continued on Bradford as above however noted with resistance and changed to Zosyn (10/2 - 10/5) with course complicated by possible drug rxn. Zosyn changed to Aztreonam (10/5 - 10/6) however RPT SCx (10/3) with  PSA however only intermediate coverage to aztreonam and amikacin.  PSA grossly resistant to other antibiotics other than cephalosporins however patient with reactions in the past. Case discussed with ID and ID pharmacist and change to Polymyxin (10/6 - 10/15) however continues to spike fevers likely second to Polymyxin only intermediate coverage and Recarbio resistant .   - Return of fever and RPT Sputum (10/14) wtith  PSA now sensitive on Aztreonam (10/16 - 10/25) x 10 days.   - Overall difficulty with ABX tailoring given allergic rxns and resistant organisms.     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  - HH remained labile requiring xfusion (10/15) however noted with questionable reaction with rash and BB called. Work up being sent.   - Monitor HH     VASCULAR   # LLE POP DVT   - US BL LE (9/10) with acute left deep vein thrombosis of the left popliteal vein.  - RUE swollen and VA DOPPLER RUE (10/3) with R IJ DVT and R brachial DVT.  - Eliquis held for permacath however procedure held.   - Eliquis remains on hold second to mild suction trauma hemoptysis.   - Resume Eliquis when able.      # HD access  - L IJ CLAUDIA (9/27 - )   - Planned for IR permacath cancelled for today and will reattempt when infectious status improves.     ONC   # Hx of Breast CA   - Patient dx in 2018 and s/p BL mastectomy (radical on right) and chemo and RT.   - Supportive care.     ENDOCRINE  # IDDM2   - Continued on NPH with ISS, however noted with hypoglycemic episodes and NPH dc'ed.    - Finger sticks trending up off NPH.   - Continue on NPH 6U with moderate ISS.   - Adjust as need     SKIN  # Drug Eruption   - Attempted cefepime (8/12) vs ancef (8/13-8/14) and then noted with drug rxn in ICU. Continue on steroids with prednisone 40 (8/18 - 9/2) then prednisone 30 (9/3-9/7), then prednisone 20 (9/8 - 9/10), then prednisone 10mg (9/11-9/13) then turn off.   - Attempted Zosyn (10/2-10/5) with possible rash. Zosyn turned off with improvement.   - Hold further cephalosporins or PCNs at this time   - Monitor for further drug rxns.     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   While in MICU patient noted with worsening renal failure requiring HD initiation, CGE/ Melena s/p EGD 8/31 found with esophagitis and erosive gastropathy, new right cerebellar infarct and fevers second to ESBL COLI and MSSA VAP, MSSA bacteremia, left ear otitis externa and drug rxn to cephalosporins. 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 Case discussed at length renal and given good UOP attempted renal recovery, however failed, and upon reinitiation of HD attempt. R IJ SHILEY failed. Attempted volume removal with Bumex GTT however course complicated by hypotension requiring transfer back to MICU 9/26 for pressor support. Diuresis dc'ed, pressors weaned off and stress steroids started. L IJ SHILEY placed (9/30) and HD reinstated. Course further complicated by AOCD and  PSA and Proteus VAP. Patient transferred back to RCU 10/1 with course complicated by volume overload and grossly resistant organisms. Course further c/b intermittent persistent fevers and allergic transfusion reaction s/p PRBC transfusion (10/15).     On 10/19, pt had progressive hypotension post HD already on midodrine and droxidopa. GOC discussion reinitiated with family who again stated they want everything done for the patient. Pt will be transferred to MICU for vasopressor support while on HD.     PAST MEDICAL & SURGICAL HISTORY:   Breast CA      Diabetes      Stroke      Cardiac arrest      HTN (hypertension)      H/O mastectomy, bilateral          FAMILY HISTORY:  No pertinent family history in first degree relatives        SOCIAL HISTORY:  Smoking:   Substance Use:   EtOH Use:   Advance Directives:     MEDICATIONS  (STANDING):  albuterol    0.083% 2.5 milliGRAM(s) Nebulizer every 6 hours  apixaban 5 milliGRAM(s) Oral every 12 hours  artificial tears (preservative free) Ophthalmic Solution 1 Drop(s) Both EYES every 4 hours  atorvastatin 10 milliGRAM(s) Oral at bedtime  aztreonam  IVPB 2000 milliGRAM(s) IV Intermittent <User Schedule>  chlorhexidine 0.12% Liquid 15 milliLiter(s) Oral Mucosa every 12 hours  chlorhexidine 2% Cloths 1 Application(s) Topical daily  dextrose 5%. 1000 milliLiter(s) (50 mL/Hr) IV Continuous <Continuous>  dextrose 5%. 1000 milliLiter(s) (100 mL/Hr) IV Continuous <Continuous>  dextrose 50% Injectable 12.5 Gram(s) IV Push once  dextrose 50% Injectable 25 Gram(s) IV Push once  dextrose 50% Injectable 25 Gram(s) IV Push once  dextrose 50% Injectable 25 Gram(s) IV Push once  droxidopa 100 milliGRAM(s) Oral three times a day  epoetin odalis (EPOGEN) Injectable 81463 Unit(s) IV Push <User Schedule>  ferrous    sulfate Liquid 300 milliGRAM(s) Oral daily  glucagon  Injectable 1 milliGRAM(s) IntraMuscular once  insulin lispro (ADMELOG) corrective regimen sliding scale   SubCutaneous every 6 hours  insulin NPH human recombinant 6 Unit(s) SubCutaneous every 6 hours  levETIRAcetam  Solution 1000 milliGRAM(s) Oral daily  levETIRAcetam  Solution 250 milliGRAM(s) Oral <User Schedule>  midodrine. 30 milliGRAM(s) Oral every 8 hours  pantoprazole  Injectable 40 milliGRAM(s) IV Push two times a day  petrolatum Ophthalmic Ointment 1 Application(s) Both EYES four times a day  sodium chloride 1 Gram(s) Oral two times a day    MEDICATIONS  (PRN):  acetaminophen   Oral Liquid .. 650 milliGRAM(s) Oral every 6 hours PRN Temp greater or equal to 38C (100.4F), Mild Pain (1 - 3)  dextrose Oral Gel 15 Gram(s) Oral once PRN Blood Glucose LESS THAN 70 milliGRAM(s)/deciliter  diphenhydrAMINE Elixir 50 milliGRAM(s) Oral once PRN Rash and/or Itching  sodium chloride 0.9% Bolus. 250 milliLiter(s) IV Bolus every 5 minutes PRN SBP LESS THAN or EQUAL to 90 mmHg  sodium chloride 0.9% lock flush 10 milliLiter(s) IV Push every 1 hour PRN Pre/post blood products, medications, blood draw, and to maintain line patency      Allergies    isoniazid (Rash)  nafcillin (Unknown)  hydrALAZINE (Rash)  vitamin E (Short breath; Urticaria; Hives)  doxycycline (Rash)  cefepime (Rash)  NIFEdipine (Urticaria; Hives)  Zosyn (Rash)    Intolerances          OBJECTIVE:  ICU Vital Signs Last 24 Hrs  T(C): 36.7 (19 Oct 2023 15:25), Max: 37.2 (19 Oct 2023 04:49)  T(F): 98.1 (19 Oct 2023 15:25), Max: 98.9 (19 Oct 2023 04:49)  HR: 123 (19 Oct 2023 15:25) (100 - 123)  BP: 83/28 (19 Oct 2023 15:25) (62/40 - 127/54)  BP(mean): --  ABP: --  ABP(mean): --  RR: 20 (19 Oct 2023 15:25) (18 - 20)  SpO2: 98% (19 Oct 2023 15:25) (91% - 100%)    O2 Parameters below as of 19 Oct 2023 15:25  Patient On (Oxygen Delivery Method): ventilator    O2 Concentration (%): 35      Mode: AC/ CMV (Assist Control/ Continuous Mandatory Ventilation), RR (machine): 24, FiO2: 35, PEEP: 8, ITime: 0.8, MAP: 19, PC: 32, PIP: 40    10-18 @ 07:01  -  10-19 @ 07:00  --------------------------------------------------------  IN: 1160 mL / OUT: 0 mL / NET: 1160 mL    10-19 @ 07:01  -  10-19 @ 17:02  --------------------------------------------------------  IN: 400 mL / OUT: 900 mL / NET: -500 mL      CAPILLARY BLOOD GLUCOSE      POCT Blood Glucose.: 140 mg/dL (19 Oct 2023 11:16)        LABS:                        7.8    15.27 )-----------( 499      ( 19 Oct 2023 11:44 )             25.1     Hgb Trend: 7.8<--, 7.5<--, 9.0<--, 6.8<--, 7.6<--  10-19    135  |  99  |  36<H>  ----------------------------<  143<H>  5.0   |  27  |  1.63<H>    Ca    8.6      19 Oct 2023 11:44  Phos  4.1     10-19  Mg     2.10     10-19      Creatinine Trend: 1.63<--, 1.73<--, 1.71<--, 1.29<--, 1.69<--, 1.35<--    Urinalysis Basic - ( 19 Oct 2023 11:44 )    Color: x / Appearance: x / SG: x / pH: x  Gluc: 143 mg/dL / Ketone: x  / Bili: x / Urobili: x   Blood: x / Protein: x / Nitrite: x   Leuk Esterase: x / RBC: x / WBC x   Sq Epi: x / Non Sq Epi: x / Bacteria: x          ====================ASSESSMENT======================  74 YO F with PMHx of IDDM2, HTN, Diabetic Nephropathic CKD, HFpEF, Breast Cancer s/p BL mastectomy, chemotherapy and radiation (2018), Respiratory and Cardiac Arrest (2018), 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       Plan:  NEUROLOGY  # NEW cerebella infarct   - Baseline MS AOX3 with short term memory changes per family however noted with concern for poor mentation in ICU  - MRI (8/17) with NEW right cerebellar infarct and old left PCA/occipital infarcts  - STEPHANIE (8/17) with AV showing two linear mobile echogenic elements attached to valve leaflets consistent with Lambel's excrescence, but no TRINITY thrombus, and lambel's excrescence with uncertain clinical implication.   - EEG (8/11-8/15) with no seizures   - ILR interrogation (9/7) with SR and PACs falsely recorded as AF.   - Continue on ASA and Lipitor for medical management     # Seizures   - Course complicated by questionable head and body shaking with prolactin elevated 9/8. Discussed for spot EEG however held given low likelihood of seizures noted and acute respiratory illnesses   - Course further complicated with right arm twitching and RPT EEG (10/4) with no seizure however but noted with increase seizure potential in left posterior quadrants.   - RPT EEG (10/5) with possible focal seizures and risk of focal-onset seizures from multiple locations, most prominent in the left posterior temporal/posterocentral region  - RPT EEG (10/6) with RUE twitching are likely non-epileptic in nature, however risk of focal-onset seizures from multiple locations  - Continue on Keppra PPX     # Metabolic vs Uremic Encephalopathy   - Lethargy noted with progression to stupor thought to be second to uremia.   - RPT CTH (9/26) with vague areas of hypoattenuation within the bilateral cerebellar hemispheres which may be compatible with acute/subacute ischemia.   - Discussed CTH with neurology and no signs of new infarct noted.   - HD reinstated in ICU with improvement in uremia however mentation remained   - Poor mentation likely in setting of toxic encephalopathy in setting of infections.   - Pending MRI head    CARDIOVASCULAR  # HTN Urgency   # Septic vs vasoplegic shock   - Labile BPs ranging in between SBP 80s-200s and attempted labetalol, however noted with hypotension and bradycardia likely from BB toxicity vs shock state?    - Holding Labetalol, Catapres and Catapres.    - Attempted volume removal with bumex diuresis however noted with return of shock state requiring pressor support and transfer back to ICU.   - Pressors weaned off to stress dose (last day 10/4) and Midodrine   - Continue on Midodrine 30 TID (9/29 - ) however continued labile BPs with difficult HD sessions. Droxiopa 100 TID added (10/18 - )   - Ensure Midodrine and Droxidopa are timed 30-60 minutes prior to HD  - DO NOT HOLD DROXIDOPA AND MIDODRINE     - Pt progressively hypotensive during HD on oral pressors  - Will require IV pressor for HD, CAP at 1 pressor    # Hx of HFpEF with likely ADHF with volume overload.   - ECHO (7/2023) with EF 59 with severe LVH and diastolic dysfunction   - STEPHANIE (8/18) with normal LVRVSF however noted with increased LA pressures and persistent LA septal bowing into RA.   - ECHO (8/11) with EF 60 with mild LVH, normal LVRVSF, and mild ddfxn.  - Grossly volume overloaded and removing volume with HD midodrine assisted sessions     HEENT   # Left ear OE with acute on chronic left-sided otomastoiditis  - ENT evaluation (9/7) with no mastoid tenderness, however found with positive swelling and excoriation to left auricle and tenderness to manipulation of auricle. Debrided crusting of auricle and EAC evaluated with edema and unable to visualize TM. EAC debrided and found with watery exudate.   - CT IAC with acute on chronic left-sided otitis externa and acute on chronic left-sided otomastoiditis. Superimposed left-sided tympanosclerosis. Superimposed cholesteatoma formation within the left tympanomastoid cavity cannot be excluded  - Completed CiproHC (9/5 - 9/16), Bradford (9/1-10/1) and acetic acid and bacitracin.   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   - Continue on empiric valtrex 500mg BID (9/29 - 10/8) per ophtho and ID  - Continue preservative free artificial tears 4 times a day in the both eye  - Continue lacrilube ointment twice a day in the both eyes     # Oral Lesions with questionable Zoster vs Trauma?   - Patient with tongue pain and seen with anterior ulcerations consolidating and crusting and posterior ulceration.  - Case discussed with pathology resident and culture/ cytology sent.   - HSV negative and Path (9/6) with normal appearing epithelial cells singly and in clusters devoid of viral cytopathic effect.   - Continue on magic mouth wash for pain    RESPIRATORY  # AHHRF second to volume overload   - Hx of CKD and HFpEF presented with SOB and hypercarbia second to volume overload requiring intubation and HD initiation   - Vent weaning attempted however limited requiring tracheostomy (9/1) with IP   - Course complicated by PIPs elevated and found with tracheomalacia and volume overloaded.   - CT CHEST (9/8) with tracheomalacia, BL pleural effusions and continued consolidations   - Continue on vent and given TBM increased PEEP to 8 with improvement in dynamic collapse, but PIP waxes and wanes with volume overloaded and secretions.  - Changed vent to PC 24/35/8/40 with slight improvement in PIP (40s)   - Continue on albuterol and chest PT  - Continue volume removal with HD   - Attempted HTS but noted with hemoptysis and held   - Hold Atrovent given thick secretions   - Hold IPV given high inspiratory pressures      GI  # UGIB   - CGE x 1 episode on 8/24 and melena x 2 episodes on 8/31 likely residual blood.   - EGD (8/31) found with esophagitis and erosive gastropathy  - Continue on PPI BID through late October and then PPI QD     # Dysphagia   - NGT-TF continued   - Pending PEG however needs improvement prior to placement.     RENAL  # Progressive CKD   - Presented volume overload and progressive RUSS on CKD   - POCUS with no signs of hydronephrosis in ICU  - Pressor support started with improvement in renal function in ICU  - Attempted steroid course in ICU without improvement in renal function   - Case discussed at length with renal and initiated on HD in ICU   - Remain on HD while in RCU however noted to be volume overloaded. Attempted Bumex pushes renal recovery and patient noted with good UOP, but BUN/ CRE continued to rise without improvement on diuresis.   - Ultimately resumed on HD MWF TIW with midodrine assisted volume removal, however pressure remains soft with difficulty removing aggressive fluids.   - Added further pressor support as above and will continue to attempt HD/ UF     - Needs IV pressor for HD, will cap at 1 pressor  - CCRT is not currently an option    # Hyponatremia   - Continue on salt tabs 1G  (decreased 10/6) and weaning off as tolerated with volume removal. Monitor sodium     # Hyperphosphatemia to Hypophosphatemia with HD  - PTH normal and elevated phos likely from renal failure  - Phos binder with Renvela started with improvement however then noted with hypophosphatemia and Renvela stopped.     INFECTIOUS DISEASE  # ESBL ECOLI and MSSA VAP c/b MSSA bacteremia   - RVP/ COVID (8/11) negative   - SCx (8/11) with MSSA s/p cefepime (8/12-8/13) and then ancef (8/14) however noted with drug reaction and then changed to vanco and aztreonam (8/12-8/13) in ICU .   - Course complicated by recurrent fevers and return of MSSA with bacteremia.   - SCx (9/1) with MSSA and ESBL ECOLI   - BAL (9/1) with MSSA   - BCx (9/1) with MSSA and cleared on (9/4)   - ECHO (9/6) unable to rule out endocarditis   - Continue on Vanco (9/4-9/22) however noted with rashes of uncertain etiology and replaced with Bradford (9/4 - 10/2) and DAPTO (9/26-10/2) for  completion.     # Proteus and  PSA VAP/ Trachitis   - Continued fevers and SCx (9/29) with MDR  PSA and Proteus   - Continued on Bradford as above however noted with resistance and changed to Zosyn (10/2 - 10/5) with course complicated by possible drug rxn. Zosyn changed to Aztreonam (10/5 - 10/6) however RPT SCx (10/3) with  PSA however only intermediate coverage to aztreonam and amikacin.  PSA grossly resistant to other antibiotics other than cephalosporins however patient with reactions in the past. Case discussed with ID and ID pharmacist and change to Polymyxin (10/6 - 10/15) however continues to spike fevers likely second to Polymyxin only intermediate coverage and Recarbio resistant .   - Return of fever and RPT Sputum (10/14) wtith  PSA now sensitive on Aztreonam (10/16 - 10/25) x 10 days.   - Overall difficulty with ABX tailoring given allergic rxns and resistant organisms.     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  - HH remained labile requiring xfusion (10/15) however noted with questionable reaction with rash and BB called. Work up being sent.   - Monitor HH     VASCULAR   # LLE POP DVT   - US BL LE (9/10) with acute left deep vein thrombosis of the left popliteal vein.  - RUE swollen and VA DOPPLER RUE (10/3) with R IJ DVT and R brachial DVT.  - Eliquis held for permacath however procedure held.   - Eliquis remains on hold second to mild suction trauma hemoptysis.   - Resume Eliquis when able.      # HD access  - L IJ CLAUDIA (9/27 - )   - Planned for IR permacath cancelled for today and will reattempt when infectious status improves.     ONC   # Hx of Breast CA   - Patient dx in 2018 and s/p BL mastectomy (radical on right) and chemo and RT.   - Supportive care.     ENDOCRINE  # IDDM2   - Continued on NPH with ISS, however noted with hypoglycemic episodes and NPH dc'ed.    - Finger sticks trending up off NPH.   - Continue on NPH 6U with moderate ISS.   - Adjust as need     SKIN  # Drug Eruption   - Attempted cefepime (8/12) vs ancef (8/13-8/14) and then noted with drug rxn in ICU. Continue on steroids with prednisone 40 (8/18 - 9/2) then prednisone 30 (9/3-9/7), then prednisone 20 (9/8 - 9/10), then prednisone 10mg (9/11-9/13) then turn off.   - Attempted Zosyn (10/2-10/5) with possible rash. Zosyn turned off with improvement.   - Hold further cephalosporins or PCNs at this time   - Monitor for further drug rxns.     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   While in MICU patient noted with worsening renal failure requiring HD initiation, CGE/ Melena s/p EGD 8/31 found with esophagitis and erosive gastropathy, new right cerebellar infarct and fevers second to ESBL COLI and MSSA VAP, MSSA bacteremia, left ear otitis externa and drug rxn to cephalosporins. 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 Case discussed at length renal and given good UOP attempted renal recovery, however failed, and upon reinitiation of HD attempt. R IJ SHILEY failed. Attempted volume removal with Bumex GTT however course complicated by hypotension requiring transfer back to MICU 9/26 for pressor support. Diuresis dc'ed, pressors weaned off and stress steroids started. L IJ SHILEY placed (9/30) and HD reinstated. Course further complicated by AOCD and  PSA and Proteus VAP. Patient transferred back to RCU 10/1 with course complicated by volume overload and grossly resistant organisms. Course further c/b intermittent persistent fevers and allergic transfusion reaction s/p PRBC transfusion (10/15).     On 10/19, pt had progressive hypotension post HD already on midodrine and droxidopa. GOC discussion reinitiated with family who again stated they want everything done for the patient. Pt will be transferred to MICU for vasopressor support while on HD.     PAST MEDICAL & SURGICAL HISTORY:   Breast CA      Diabetes      Stroke      Cardiac arrest      HTN (hypertension)      H/O mastectomy, bilateral          FAMILY HISTORY:  No pertinent family history in first degree relatives        SOCIAL HISTORY:  Smoking:   Substance Use:   EtOH Use:   Advance Directives:     MEDICATIONS  (STANDING):  albuterol    0.083% 2.5 milliGRAM(s) Nebulizer every 6 hours  apixaban 5 milliGRAM(s) Oral every 12 hours  artificial tears (preservative free) Ophthalmic Solution 1 Drop(s) Both EYES every 4 hours  atorvastatin 10 milliGRAM(s) Oral at bedtime  aztreonam  IVPB 2000 milliGRAM(s) IV Intermittent <User Schedule>  chlorhexidine 0.12% Liquid 15 milliLiter(s) Oral Mucosa every 12 hours  chlorhexidine 2% Cloths 1 Application(s) Topical daily  dextrose 5%. 1000 milliLiter(s) (50 mL/Hr) IV Continuous <Continuous>  dextrose 5%. 1000 milliLiter(s) (100 mL/Hr) IV Continuous <Continuous>  dextrose 50% Injectable 12.5 Gram(s) IV Push once  dextrose 50% Injectable 25 Gram(s) IV Push once  dextrose 50% Injectable 25 Gram(s) IV Push once  dextrose 50% Injectable 25 Gram(s) IV Push once  droxidopa 100 milliGRAM(s) Oral three times a day  epoetin odalis (EPOGEN) Injectable 01510 Unit(s) IV Push <User Schedule>  ferrous    sulfate Liquid 300 milliGRAM(s) Oral daily  glucagon  Injectable 1 milliGRAM(s) IntraMuscular once  insulin lispro (ADMELOG) corrective regimen sliding scale   SubCutaneous every 6 hours  insulin NPH human recombinant 6 Unit(s) SubCutaneous every 6 hours  levETIRAcetam  Solution 1000 milliGRAM(s) Oral daily  levETIRAcetam  Solution 250 milliGRAM(s) Oral <User Schedule>  midodrine. 30 milliGRAM(s) Oral every 8 hours  pantoprazole  Injectable 40 milliGRAM(s) IV Push two times a day  petrolatum Ophthalmic Ointment 1 Application(s) Both EYES four times a day  sodium chloride 1 Gram(s) Oral two times a day    MEDICATIONS  (PRN):  acetaminophen   Oral Liquid .. 650 milliGRAM(s) Oral every 6 hours PRN Temp greater or equal to 38C (100.4F), Mild Pain (1 - 3)  dextrose Oral Gel 15 Gram(s) Oral once PRN Blood Glucose LESS THAN 70 milliGRAM(s)/deciliter  diphenhydrAMINE Elixir 50 milliGRAM(s) Oral once PRN Rash and/or Itching  sodium chloride 0.9% Bolus. 250 milliLiter(s) IV Bolus every 5 minutes PRN SBP LESS THAN or EQUAL to 90 mmHg  sodium chloride 0.9% lock flush 10 milliLiter(s) IV Push every 1 hour PRN Pre/post blood products, medications, blood draw, and to maintain line patency      Allergies    isoniazid (Rash)  nafcillin (Unknown)  hydrALAZINE (Rash)  vitamin E (Short breath; Urticaria; Hives)  doxycycline (Rash)  cefepime (Rash)  NIFEdipine (Urticaria; Hives)  Zosyn (Rash)    Intolerances          OBJECTIVE:  ICU Vital Signs Last 24 Hrs  T(C): 36.7 (19 Oct 2023 15:25), Max: 37.2 (19 Oct 2023 04:49)  T(F): 98.1 (19 Oct 2023 15:25), Max: 98.9 (19 Oct 2023 04:49)  HR: 123 (19 Oct 2023 15:25) (100 - 123)  BP: 83/28 (19 Oct 2023 15:25) (62/40 - 127/54)  BP(mean): --  ABP: --  ABP(mean): --  RR: 20 (19 Oct 2023 15:25) (18 - 20)  SpO2: 98% (19 Oct 2023 15:25) (91% - 100%)    O2 Parameters below as of 19 Oct 2023 15:25  Patient On (Oxygen Delivery Method): ventilator    O2 Concentration (%): 35      Mode: AC/ CMV (Assist Control/ Continuous Mandatory Ventilation), RR (machine): 24, FiO2: 35, PEEP: 8, ITime: 0.8, MAP: 19, PC: 32, PIP: 40    10-18 @ 07:01  -  10-19 @ 07:00  --------------------------------------------------------  IN: 1160 mL / OUT: 0 mL / NET: 1160 mL    10-19 @ 07:01  -  10-19 @ 17:02  --------------------------------------------------------  IN: 400 mL / OUT: 900 mL / NET: -500 mL      CAPILLARY BLOOD GLUCOSE      POCT Blood Glucose.: 140 mg/dL (19 Oct 2023 11:16)        Gen: trach, minimal mental status without purposeful movements  HEENT: atraumatic, normocephalic, pupils equally round and reactive to light, extraocular muscles intact, no conjunctival injection  CV: +tachycardia  Resp: +bilateral course breath sounds, bilateral inspiratory/expiratory wheeze  GI: soft, nontender, nondistended, BSx4  MSK: +diffuse anasarca   Skin: warm, dry, no rashes or lesions        LABS:                        7.8    15.27 )-----------( 499      ( 19 Oct 2023 11:44 )             25.1     Hgb Trend: 7.8<--, 7.5<--, 9.0<--, 6.8<--, 7.6<--  10-19    135  |  99  |  36<H>  ----------------------------<  143<H>  5.0   |  27  |  1.63<H>    Ca    8.6      19 Oct 2023 11:44  Phos  4.1     10-19  Mg     2.10     10-19      Creatinine Trend: 1.63<--, 1.73<--, 1.71<--, 1.29<--, 1.69<--, 1.35<--    Urinalysis Basic - ( 19 Oct 2023 11:44 )    Color: x / Appearance: x / SG: x / pH: x  Gluc: 143 mg/dL / Ketone: x  / Bili: x / Urobili: x   Blood: x / Protein: x / Nitrite: x   Leuk Esterase: x / RBC: x / WBC x   Sq Epi: x / Non Sq Epi: x / Bacteria: x          ====================ASSESSMENT======================  72 YO F with PMHx of IDDM2, HTN, Diabetic Nephropathic CKD, HFpEF, Breast Cancer s/p BL mastectomy, chemotherapy and radiation (2018), Respiratory and Cardiac Arrest (2018), 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       NEUROLOGY  # NEW cerebella infarct   - MRI w/ new R cerebellar infarct and old infarcts, TTE without TRINITY thrombus, EEG performed without seizure events  - Continue on ASA and Lipitor for medical management     # Seizures   - EEG w/ high risk for focal-onset seizures from multiple locations  - Continue on Keppra     # Metabolic vs Uremic Encephalopathy   - Lethargy noted with progression to stupor thought to be second to uremia.   - Poor mentation likely in setting of toxic encephalopathy in setting of infections  - Pending MRI head    CARDIOVASCULAR  # Septic vs vasoplegic shock   - Continue on Midodrine 30 TID (9/29 - ) however continued labile BPs with difficult HD sessions. Droxiopa 100 TID added (10/18 - )   - Will require IV pressor for HD, CAP at 1 pressor    # Hx of HFpEF with likely ADHF with volume overload  - ECHO (7/2023) with EF 59 with severe LVH and diastolic dysfunction   - STEPHANIE (8/18) with normal LVRVSF however noted with increased LA pressures and persistent LA septal bowing into RA    HEENT   # Left ear OE with acute on chronic left-sided otomastoiditis  - ENT evaluation (9/7) with no mastoid tenderness, however found with positive swelling and excoriation to left auricle and tenderness to manipulation of auricle. Debrided crusting of auricle and EAC evaluated with edema and unable to visualize TM. EAC debrided and found with watery exudate.   - CT IAC with acute on chronic left-sided otitis externa and acute on chronic left-sided otomastoiditis. Superimposed left-sided tympanosclerosis. 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   - Continue on empiric valtrex 500mg BID (9/29 - 10/8) per ophtho and ID  - Continue preservative free artificial tears 4 times a day in the both eye  - Continue lacrilube ointment twice a day in the both eyes     # Oral Lesions with questionable Zoster vs Trauma?   - Patient with tongue pain and seen with anterior ulcerations consolidating and crusting and posterior ulceration.  - Case discussed with pathology resident and culture/ cytology sent.   - HSV negative and Path (9/6) with normal appearing epithelial cells singly and in clusters devoid of viral cytopathic effect.   - Continue on magic mouth wash for pain    RESPIRATORY  # AHHRF second to volume overload   - Hx of CKD and HFpEF presented with SOB and hypercarbia second to volume overload requiring intubation and HD initiation   - Vent weaning attempted however limited requiring tracheostomy (9/1) with IP   - Course complicated by PIPs elevated and found with tracheomalacia and volume overloaded.   - CT CHEST (9/8) with tracheomalacia, BL pleural effusions and continued consolidations   - Continue on vent and given TBM increased PEEP to 8 with improvement in dynamic collapse, but PIP waxes and wanes with volume overloaded and secretions.  - Changed vent to PC 24/35/8/40 with slight improvement in PIP (40s)   - Continue on albuterol and chest PT  - Continue volume removal with HD   - Attempted HTS but noted with hemoptysis and held   - Hold Atrovent given thick secretions   - Hold IPV given high inspiratory pressures      GI  # UGIB   - CGE x 1 episode on 8/24 and melena x 2 episodes on 8/31 likely residual blood.   - EGD (8/31) found with esophagitis and erosive gastropathy  - Continue on PPI BID through late October and then PPI QD     # Dysphagia   - NGT-TF continued   - Pending PEG however needs improvement prior to placement.     RENAL  # Progressive CKD   - Presented volume overload and progressive RUSS on CKD   - POCUS with no signs of hydronephrosis in ICU  - Pressor support started with improvement in renal function in ICU  - Attempted steroid course in ICU without improvement in renal function   - Case discussed at length with renal and initiated on HD in ICU   - Remain on HD while in RCU however noted to be volume overloaded. Attempted Bumex pushes renal recovery and patient noted with good UOP, but BUN/ CRE continued to rise without improvement on diuresis.   - Ultimately resumed on HD MWF TIW with midodrine assisted volume removal, however pressure remains soft with difficulty removing aggressive fluids.   - Added further pressor support as above and will continue to attempt HD/ UF     - Needs IV pressor for HD, will cap at 1 pressor  - CCRT is not currently an option    # Hyponatremia   - Continue on salt tabs 1G  (decreased 10/6) and weaning off as tolerated with volume removal. Monitor sodium     # Hyperphosphatemia to Hypophosphatemia with HD  - PTH normal and elevated phos likely from renal failure  - Phos binder with Renvela started with improvement however then noted with hypophosphatemia and Renvela stopped.     INFECTIOUS DISEASE  # ESBL ECOLI and MSSA VAP c/b MSSA bacteremia   - RVP/ COVID (8/11) negative   - SCx (8/11) with MSSA s/p cefepime (8/12-8/13) and then ancef (8/14) however noted with drug reaction and then changed to vanco and aztreonam (8/12-8/13) in ICU .   - Course complicated by recurrent fevers and return of MSSA with bacteremia.   - SCx (9/1) with MSSA and ESBL ECOLI   - BAL (9/1) with MSSA   - BCx (9/1) with MSSA and cleared on (9/4)   - ECHO (9/6) unable to rule out endocarditis   - Continue on Vanco (9/4-9/22) however noted with rashes of uncertain etiology and replaced with Bradford (9/4 - 10/2) and DAPTO (9/26-10/2) for  completion.     # Proteus and  PSA VAP/ Trachitis   - Continued fevers and SCx (9/29) with MDR  PSA and Proteus   - Continued on Bradford as above however noted with resistance and changed to Zosyn (10/2 - 10/5) with course complicated by possible drug rxn. Zosyn changed to Aztreonam (10/5 - 10/6) however RPT SCx (10/3) with  PSA however only intermediate coverage to aztreonam and amikacin.  PSA grossly resistant to other antibiotics other than cephalosporins however patient with reactions in the past. Case discussed with ID and ID pharmacist and change to Polymyxin (10/6 - 10/15) however continues to spike fevers likely second to Polymyxin only intermediate coverage and Recarbio resistant .   - Return of fever and RPT Sputum (10/14) wtith  PSA now sensitive on Aztreonam (10/16 - 10/25) x 10 days.   - Overall difficulty with ABX tailoring given allergic rxns and resistant organisms.     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  - HH remained labile requiring xfusion (10/15) however noted with questionable reaction with rash and BB called. Work up being sent.   - Monitor HH     VASCULAR   # LLE POP DVT   - US BL LE (9/10) with acute left deep vein thrombosis of the left popliteal vein.  - RUE swollen and VA DOPPLER RUE (10/3) with R IJ DVT and R brachial DVT.  - Eliquis held for permacath however procedure held.   - Eliquis remains on hold second to mild suction trauma hemoptysis.   - Resume Eliquis when able.      # HD access  - L IJ CLAUDIA (9/27 - )   - Planned for IR permacath cancelled for today and will reattempt when infectious status improves.     ONC   # Hx of Breast CA   - Patient dx in 2018 and s/p BL mastectomy (radical on right) and chemo and RT.   - Supportive care.     ENDOCRINE  # IDDM2   - Continued on NPH with ISS, however noted with hypoglycemic episodes and NPH dc'ed.    - Finger sticks trending up off NPH.   - Continue on NPH 6U with moderate ISS.   - Adjust as need     SKIN  # Drug Eruption   - Attempted cefepime (8/12) vs ancef (8/13-8/14) and then noted with drug rxn in ICU. Continue on steroids with prednisone 40 (8/18 - 9/2) then prednisone 30 (9/3-9/7), then prednisone 20 (9/8 - 9/10), then prednisone 10mg (9/11-9/13) then turn off.   - Attempted Zosyn (10/2-10/5) with possible rash. Zosyn turned off with improvement.   - Hold further cephalosporins or PCNs at this time   - Monitor for further drug rxns.     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   While in MICU patient noted with worsening renal failure requiring HD initiation, CGE/ Melena s/p EGD 8/31 found with esophagitis and erosive gastropathy, new right cerebellar infarct and fevers second to ESBL COLI and MSSA VAP, MSSA bacteremia, left ear otitis externa and drug rxn to cephalosporins. 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 Case discussed at length renal and given good UOP attempted renal recovery, however failed, and upon reinitiation of HD attempt. R IJ SHILEY failed. Attempted volume removal with Bumex GTT however course complicated by hypotension requiring transfer back to MICU 9/26 for pressor support. Diuresis dc'ed, pressors weaned off and stress steroids started. L IJ SHILEY placed (9/30) and HD reinstated. Course further complicated by AOCD and  PSA and Proteus VAP. Patient transferred back to RCU 10/1 with course complicated by volume overload and grossly resistant organisms. Course further c/b intermittent persistent fevers and allergic transfusion reaction s/p PRBC transfusion (10/15).     On 10/19, pt had progressive hypotension post HD already on midodrine and droxidopa. GOC discussion reinitiated with family who again stated they want everything done for the patient. Pt will be transferred to MICU for vasopressor support while on HD.     PAST MEDICAL & SURGICAL HISTORY:   Breast CA      Diabetes      Stroke      Cardiac arrest      HTN (hypertension)      H/O mastectomy, bilateral          FAMILY HISTORY:  No pertinent family history in first degree relatives        SOCIAL HISTORY:  Smoking:   Substance Use:   EtOH Use:   Advance Directives:     MEDICATIONS  (STANDING):  albuterol    0.083% 2.5 milliGRAM(s) Nebulizer every 6 hours  apixaban 5 milliGRAM(s) Oral every 12 hours  artificial tears (preservative free) Ophthalmic Solution 1 Drop(s) Both EYES every 4 hours  atorvastatin 10 milliGRAM(s) Oral at bedtime  aztreonam  IVPB 2000 milliGRAM(s) IV Intermittent <User Schedule>  chlorhexidine 0.12% Liquid 15 milliLiter(s) Oral Mucosa every 12 hours  chlorhexidine 2% Cloths 1 Application(s) Topical daily  dextrose 5%. 1000 milliLiter(s) (50 mL/Hr) IV Continuous <Continuous>  dextrose 5%. 1000 milliLiter(s) (100 mL/Hr) IV Continuous <Continuous>  dextrose 50% Injectable 12.5 Gram(s) IV Push once  dextrose 50% Injectable 25 Gram(s) IV Push once  dextrose 50% Injectable 25 Gram(s) IV Push once  dextrose 50% Injectable 25 Gram(s) IV Push once  droxidopa 100 milliGRAM(s) Oral three times a day  epoetin odalis (EPOGEN) Injectable 55958 Unit(s) IV Push <User Schedule>  ferrous    sulfate Liquid 300 milliGRAM(s) Oral daily  glucagon  Injectable 1 milliGRAM(s) IntraMuscular once  insulin lispro (ADMELOG) corrective regimen sliding scale   SubCutaneous every 6 hours  insulin NPH human recombinant 6 Unit(s) SubCutaneous every 6 hours  levETIRAcetam  Solution 1000 milliGRAM(s) Oral daily  levETIRAcetam  Solution 250 milliGRAM(s) Oral <User Schedule>  midodrine. 30 milliGRAM(s) Oral every 8 hours  pantoprazole  Injectable 40 milliGRAM(s) IV Push two times a day  petrolatum Ophthalmic Ointment 1 Application(s) Both EYES four times a day  sodium chloride 1 Gram(s) Oral two times a day    MEDICATIONS  (PRN):  acetaminophen   Oral Liquid .. 650 milliGRAM(s) Oral every 6 hours PRN Temp greater or equal to 38C (100.4F), Mild Pain (1 - 3)  dextrose Oral Gel 15 Gram(s) Oral once PRN Blood Glucose LESS THAN 70 milliGRAM(s)/deciliter  diphenhydrAMINE Elixir 50 milliGRAM(s) Oral once PRN Rash and/or Itching  sodium chloride 0.9% Bolus. 250 milliLiter(s) IV Bolus every 5 minutes PRN SBP LESS THAN or EQUAL to 90 mmHg  sodium chloride 0.9% lock flush 10 milliLiter(s) IV Push every 1 hour PRN Pre/post blood products, medications, blood draw, and to maintain line patency      Allergies    isoniazid (Rash)  nafcillin (Unknown)  hydrALAZINE (Rash)  vitamin E (Short breath; Urticaria; Hives)  doxycycline (Rash)  cefepime (Rash)  NIFEdipine (Urticaria; Hives)  Zosyn (Rash)    Intolerances          OBJECTIVE:  ICU Vital Signs Last 24 Hrs  T(C): 36.7 (19 Oct 2023 15:25), Max: 37.2 (19 Oct 2023 04:49)  T(F): 98.1 (19 Oct 2023 15:25), Max: 98.9 (19 Oct 2023 04:49)  HR: 123 (19 Oct 2023 15:25) (100 - 123)  BP: 83/28 (19 Oct 2023 15:25) (62/40 - 127/54)  BP(mean): --  ABP: --  ABP(mean): --  RR: 20 (19 Oct 2023 15:25) (18 - 20)  SpO2: 98% (19 Oct 2023 15:25) (91% - 100%)    O2 Parameters below as of 19 Oct 2023 15:25  Patient On (Oxygen Delivery Method): ventilator    O2 Concentration (%): 35      Mode: AC/ CMV (Assist Control/ Continuous Mandatory Ventilation), RR (machine): 24, FiO2: 35, PEEP: 8, ITime: 0.8, MAP: 19, PC: 32, PIP: 40    10-18 @ 07:01  -  10-19 @ 07:00  --------------------------------------------------------  IN: 1160 mL / OUT: 0 mL / NET: 1160 mL    10-19 @ 07:01  -  10-19 @ 17:02  --------------------------------------------------------  IN: 400 mL / OUT: 900 mL / NET: -500 mL      CAPILLARY BLOOD GLUCOSE      POCT Blood Glucose.: 140 mg/dL (19 Oct 2023 11:16)        Gen: trach, minimal mental status without purposeful movements  HEENT: atraumatic, normocephalic, pupils equally round and reactive to light, extraocular muscles intact, no conjunctival injection  CV: +tachycardia  Resp: +bilateral course breath sounds, bilateral inspiratory/expiratory wheeze  GI: soft, nontender, nondistended, BSx4  MSK: +diffuse anasarca   Skin: warm, dry, no rashes or lesions        LABS:                        7.8    15.27 )-----------( 499      ( 19 Oct 2023 11:44 )             25.1     Hgb Trend: 7.8<--, 7.5<--, 9.0<--, 6.8<--, 7.6<--  10-19    135  |  99  |  36<H>  ----------------------------<  143<H>  5.0   |  27  |  1.63<H>    Ca    8.6      19 Oct 2023 11:44  Phos  4.1     10-19  Mg     2.10     10-19      Creatinine Trend: 1.63<--, 1.73<--, 1.71<--, 1.29<--, 1.69<--, 1.35<--    Urinalysis Basic - ( 19 Oct 2023 11:44 )    Color: x / Appearance: x / SG: x / pH: x  Gluc: 143 mg/dL / Ketone: x  / Bili: x / Urobili: x   Blood: x / Protein: x / Nitrite: x   Leuk Esterase: x / RBC: x / WBC x   Sq Epi: x / Non Sq Epi: x / Bacteria: x          ====================ASSESSMENT======================  74 YO F with PMHx of IDDM2, HTN, Diabetic Nephropathic CKD, HFpEF, Breast Cancer s/p BL mastectomy, chemotherapy and radiation (2018), Respiratory and Cardiac Arrest (2018), 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       NEUROLOGY  # NEW cerebella infarct   - MRI w/ new R cerebellar infarct and old infarcts, TTE without TRINITY thrombus, EEG performed without seizure events  - Continue on ASA and Lipitor for medical management     # Seizures   - EEG w/ high risk for focal-onset seizures from multiple locations  - Continue on Keppra     # Metabolic vs Uremic Encephalopathy   - Lethargy noted with progression to stupor thought to be second to uremia.   - Poor mentation likely in setting of toxic encephalopathy in setting of infections  - Pending MRI head    CARDIOVASCULAR  # Septic vs vasoplegic shock   - Continue on Midodrine 30 TID (9/29 - ) however continued labile BPs with difficult HD sessions. Droxiopa 100 TID added (10/18 - )   - Will require IV pressor for HD, CAP at 1 pressor    # Hx of HFpEF with likely ADHF with volume overload  - ECHO (7/2023) with EF 59 with severe LVH and diastolic dysfunction   - STEPHANIE (8/18) with normal LVRVSF however noted with increased LA pressures and persistent LA septal bowing into RA    HEENT   # Left ear OE with acute on chronic left-sided otomastoiditis  - ENT evaluation (9/7) with no mastoid tenderness, however found with positive swelling and excoriation to left auricle and tenderness to manipulation of auricle. Debrided crusting of auricle and EAC evaluated with edema and unable to visualize TM. EAC debrided and found with watery exudate.   - CT IAC with acute on chronic left-sided otitis externa and acute on chronic left-sided otomastoiditis. Superimposed left-sided tympanosclerosis. 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   - Continue on empiric valtrex 500mg BID (9/29 - 10/8) per ophtho and ID  - Continue preservative free artificial tears 4 times a day in the both eye  - Continue lacrilube ointment twice a day in the both eyes     # Oral Lesions with questionable Zoster vs Trauma?   - Patient with tongue pain and seen with anterior ulcerations consolidating and crusting and posterior ulceration.  - Case discussed with pathology resident and culture/ cytology sent.   - HSV negative and Path (9/6) with normal appearing epithelial cells singly and in clusters devoid of viral cytopathic effect.   - Continue on magic mouth wash for pain    RESPIRATORY  # AHHRF second to volume overload   - Hx of CKD and HFpEF presented with SOB and hypercarbia second to volume overload requiring intubation and HD initiation   - Vent weaning attempted however limited requiring tracheostomy (9/1) with IP   - Course complicated by PIPs elevated and found with tracheomalacia and volume overloaded.   - CT CHEST (9/8) with tracheomalacia, BL pleural effusions and continued consolidations   - Continue on vent and given TBM increased PEEP to 8 with improvement in dynamic collapse, but PIP waxes and wanes with volume overloaded and secretions.  - Changed vent to PC 24/35/8/40 with slight improvement in PIP (40s)   - Continue on albuterol and chest PT  - Continue volume removal with HD   - Attempted HTS but noted with hemoptysis and held   - Hold Atrovent given thick secretions   - Hold IPV given high inspiratory pressures      GI  # UGIB   - CGE x 1 episode on 8/24 and melena x 2 episodes on 8/31 likely residual blood.   - EGD (8/31) found with esophagitis and erosive gastropathy  - Continue on PPI BID through late October and then PPI QD     # Dysphagia   - NGT-TF continued   - Pending PEG however needs improvement prior to placement.     RENAL  # Progressive CKD   - Presented volume overload and progressive RUSS on CKD   - POCUS with no signs of hydronephrosis in ICU  - Pressor support started with improvement in renal function in ICU  - Attempted steroid course in ICU without improvement in renal function   - Case discussed at length with renal and initiated on HD in ICU   - Remain on HD while in RCU however noted to be volume overloaded. Attempted Bumex pushes renal recovery and patient noted with good UOP, but BUN/ CRE continued to rise without improvement on diuresis.   - Ultimately resumed on HD MWF TIW with midodrine assisted volume removal, however pressure remains soft with difficulty removing aggressive fluids.   - Added further pressor support as above and will continue to attempt HD/ UF     - Needs IV pressor for HD, will cap at 1 pressor  - CCRT is not currently an option    # Hyponatremia   - Continue on salt tabs 1G  (decreased 10/6) and weaning off as tolerated with volume removal. Monitor sodium     # Hyperphosphatemia to Hypophosphatemia with HD  - PTH normal and elevated phos likely from renal failure  - Phos binder with Renvela started with improvement however then noted with hypophosphatemia and Renvela stopped.     INFECTIOUS DISEASE  # ESBL ECOLI and MSSA VAP c/b MSSA bacteremia   - RVP/ COVID (8/11) negative   - SCx (8/11) with MSSA s/p cefepime (8/12-8/13) and then ancef (8/14) however noted with drug reaction and then changed to vanco and aztreonam (8/12-8/13) in ICU .   - Course complicated by recurrent fevers and return of MSSA with bacteremia.   - SCx (9/1) with MSSA and ESBL ECOLI   - BAL (9/1) with MSSA   - BCx (9/1) with MSSA and cleared on (9/4)   - ECHO (9/6) unable to rule out endocarditis   - Continue on Vanco (9/4-9/22) however noted with rashes of uncertain etiology and replaced with Bradford (9/4 - 10/2) and DAPTO (9/26-10/2) for  completion.     # Proteus and  PSA VAP/ Trachitis   - Continued fevers and SCx (9/29) with MDR  PSA and Proteus   - Continued on Bradford as above however noted with resistance and changed to Zosyn (10/2 - 10/5) with course complicated by possible drug rxn. Zosyn changed to Aztreonam (10/5 - 10/6) however RPT SCx (10/3) with  PSA however only intermediate coverage to aztreonam and amikacin.  PSA grossly resistant to other antibiotics other than cephalosporins however patient with reactions in the past. Case discussed with ID and ID pharmacist and change to Polymyxin (10/6 - 10/15) however continues to spike fevers likely second to Polymyxin only intermediate coverage and Recarbio resistant .   - Return of fever and RPT Sputum (10/14) wtith  PSA now sensitive on Aztreonam (10/16 - 10/25) x 10 days.   - Overall difficulty with ABX tailoring given allergic rxns and resistant organisms.     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  - HH remained labile requiring xfusion (10/15) however noted with questionable reaction with rash and BB called. Work up being sent.   - Monitor HH     VASCULAR   # LLE POP DVT   - US BL LE (9/10) with acute left deep vein thrombosis of the left popliteal vein.  - RUE swollen and VA DOPPLER RUE (10/3) with R IJ DVT and R brachial DVT.  - Eliquis held for permacath however procedure held.   - Eliquis remains on hold second to mild suction trauma hemoptysis.   - Resume Eliquis when able.      # HD access  - L IJ ASHLEE (9/27 - )   - Planned for IR permacath cancelled for today and will reattempt when infectious status improves.     ONC   # Hx of Breast CA   - Patient dx in 2018 and s/p BL mastectomy (radical on right) and chemo and RT.   - Supportive care.     ENDOCRINE  # IDDM2   - Continued on NPH with ISS, however noted with hypoglycemic episodes and NPH dc'ed.    - Finger sticks trending up off NPH.   - Continue on NPH 6U with moderate ISS.   - Adjust as need     SKIN  # Drug Eruption   - Attempted cefepime (8/12) vs ancef (8/13-8/14) and then noted with drug rxn in ICU. Continue on steroids with prednisone 40 (8/18 - 9/2) then prednisone 30 (9/3-9/7), then prednisone 20 (9/8 - 9/10), then prednisone 10mg (9/11-9/13) then turn off.   - Attempted Zosyn (10/2-10/5) with possible rash. Zosyn turned off with improvement.   - Hold further cephalosporins or PCNs at this time   - Monitor for further drug rxns.     ETHICS/ GOC    - Attempted palliative discussion however family not interested and wishes for FULL CODE  - Family continues to want everything done despite HD failure on multiple oral pressors    DISPO - TBD            MICU Attending addendum:   72 yo F (wheelchair bound prior to admission) with HFpEF, T2DM, CKD, LTBI (s/p tx), hx breast ca (2018, s/p mastectomy and adjuvant CT/RT), and hx prior CVA s/p trach/PEG (decannulated prior to admission, ILR in place) who was initially admitted on 8/11/23 after p/w with acute hypoxemic and hypercapnic respiratory failure thought to be 2/2 Aspiration PNA vs ADHF/flash pulmonary edema in setting of hypertensive emergency requiring intubation and mechanical ventilationt and prolonged ICU admission which was complicated by new right cerebellar, midbrain, and multiple tiny embolic infarcts (known left PCA CVA), UGIB (s/p EGD 8/31 which showed esophagitis/erosive gastropathy now on PPI BID, ASA held), ARF and AIN (s/p prednisone) requiring dialysis (has been on and off during admission), failure to wean from ventilator s/p tracheostomy placement (IP 9/1) and RCU transfer for further management.   Her hospital course has also been marked by recurrent infections including MSSA bacteremia and MSSA/ESBL EColi in BAL, L otitis externa (9/5) s/p ENT debridement x2 (last 9/21) c/b concern for superimposed candida infection. She had a return of fever and had a repeat sputum with  PSA on Aztroname (10/16-10/25) planned for 10 day course.   She has had repeated MICU stays for hypotension and multiorgan failure.     She now presents back to the ICU for persistent hypotension without shock and need for vasopressor support with dialysis.   N: Metabolic encephalopathy, multiple CVA's, dementia, ? seizure (no focus appreciated on EEG but multiple areas of epileptogenic potential)   - Neuro following, appreciate input  CV: Hypotension, unclear etiology. ? vasoplegia in the setting of multiple infections, prolonged hospitalization and deconditioning. No evidence of antonina shock  MAP goal >65  P: Acute respiratory failure requiring trach and mechanical ventilator support. At the time of our exam, she was on PSV 30/8, with -300 range. Suspect related to history of multifocal pneumonia, pulmonary edema, compressive atalectasis from bilateral effusions, obesity  - Continue ventilator support, minimize pressures as able  R: ARF requiring dialysis  - Ashlee placed 9/27 (planned for IR permcath once free of infection)  - Discussion with renal attg today. We will plan to pursue dialysis with vasopressor support, if unable to tolerate, she will not be a candidate for CRRT or further HD.   GI: History fo UGIB, now stable  ID: ESBL EColi and MSSA VAP c/b MSSA bacteremia  Proteus and  PSA VAP/tracheitis  - Now on Aztreonam, planned for 10 day course  Heme: LLE DVT, RIJ and Brachial DVT  Anemia   H/O breast cancer  Derm: Multiple drug reactions during hospitalization: Cefepime v Ancef (8/2023), Zosyn (9/2023)  - Hold further cephalisporins    Full Code  - Full A/C

## 2023-10-19 NOTE — PROGRESS NOTE ADULT - TIME-BASED
35
35
60
35
43
43
55
55
35
35
55
60
35
45
55
60
35
43
55
35
35
55
35
56
35
60
35
55
35
55
60
57
55

## 2023-10-19 NOTE — PROGRESS NOTE ADULT - NS_MD_PANP_GEN_ALL_CORE
Attending and PA/NP shared services statement (NON-critical care):

## 2023-10-19 NOTE — CHART NOTE - NSCHARTNOTEFT_GEN_A_CORE
RCU PA NOTE     Called by RN again for patient post HD with initial SBP 1-teens however noted with progressive decline and hypotension 83/23 with MAP 35. Pending complete MICU evaluation and will attempt oncotic resuscitation for now. Attempted to re-discuss GOC given concern for clinical decline however family stated "we've already made our decision and want everything done, therefore we do not need to talk about it again."    Alexey Candelario PA-C  Department of Medicine/ RCU  In house [facility] [place] [id number]  In house Medicine [place] [id number]  Reachable via teams RCU PA NOTE     Called by RN again for patient post HD with initial SBP 1-teens however noted with progressive decline and hypotension 54/20 with MAP 31. Patient placed in Trendelenburg and RPT BP 83/23 MAP 35. Pending complete MICU evaluation and will attempt oncotic resuscitation for now. Attempted to re-discuss GOC given concern for clinical decline however family stated "we've already made our decision and want everything done, therefore we do not need to talk about it again."    Alexey Candelario PA-C  Department of Medicine/ RCU  In house [facility] [id number]  In house Medicine [facility] [id number]  Reachable via teams RCU PA NOTE     Called by RN again for patient post HD with initial SBP 1-teens however noted with progressive decline and hypotension 54/20 with MAP 31. Patient placed in Trendelenburg and RPT BP 83/23 MAP 35 and . Pending complete MICU evaluation and will attempt oncotic resuscitation for now. Attempted to re-discuss GOC given concern for clinical decline however family stated "we've already made our decision and want everything done, therefore we do not need to talk about it again."    BETTINA Mcdowell-C  Department of Medicine/ RCU  In house [facility] [id number]  In house Medicine [facility] [id number]  Reachable via teams

## 2023-10-19 NOTE — PROGRESS NOTE ADULT - NS ATTEND BILL GEN_ALL_CORE
CC:  Jolie Zavala is here today for Physical and Gyn Exam   No concerns    Medications have been reviewed and updated and No medications are needed to be refilled at this time    Patient preferred phone number: [phone]  Ok to leave detailed message on [employer]    Health Maintenance Due   Topic Date Due   • Cervical Cancer Screening - <30 3 year  Never done   • Influenza Vaccine (1) Never done   • COVID-19 Vaccine (3 - Booster for Moderna series) 02/03/2022       Patient is due for topics listed above, she wishes to proceed with Cervical cancer screening, but is not proceeding with Immunization(s) COVID-19 and Influenza at this time. The following has occurred:     Recent PHQ 2/9 Score    PHQ 2:  Date Adult PHQ 2 Score Adult PHQ 2 Interpretation   2/14/2022 0 No further screening needed       PHQ 9:       
Attending to bill
PA/NP to bill
Attending to bill

## 2023-10-19 NOTE — CHART NOTE - NSCHARTNOTEFT_GEN_A_CORE
RCU PA POCUS NOTE     : BETTINA ZENG     INDICATION: Hypotension and Hypoxia     PROCEDURE:  [x ] LIMITED ECHO  [x ] LIMITED CHEST  [ ] LIMITED RETROPERITONEAL  [ ] LIMITED ABDOMINAL  [ ] LIMITED DVT  [ ] NEEDLE GUIDANCE VASCULAR  [ ] NEEDLE GUIDANCE THORACENTESIS  [ ] NEEDLE GUIDANCE PARACENTESIS  [ ] NEEDLE GUIDANCE PERICARDIOCENTESIS  [ ] OTHER    FINDINGS:  Bilateral lung sliding with B lines bilateral and moderate to large posterior simple pleural effusions with compressive atelectasis vs consolidations bilaterally. LVSF appears preserved to mildly reduced in parasternal long axis view with mildly dilated RV. In subcostal view RV and RA appears dilated however remains smaller than LV and LA respectfully. IVC measured as 1.5-1.7cm and trace pericardial effusion noted.     INTERPRETATION:  Moderate to large posterior simple pleural effusions.   Preserved to mildly reduced LVSF.   RV slightly plump however no signs of RV overload noted.     Alexey Zeng PA-C  Department of Medicine/ RCU  In house [facility] [place] [id number]  In house Medicine [facility] [id number]  Reachable via teams

## 2023-10-19 NOTE — CHART NOTE - NSCHARTNOTEFT_GEN_A_CORE
RCU PA NOTE     Called by RN for patient with desaturation to 80 and hypotension to SBP 63 x 3 episodes during HD despite midodrine 30 and doxidropa 100 given prior to session (timed correctly). Apprehensive on increasing doxidropa dose further given diastolic hypotension (baseline 50s to 30s on dose) and tachycardia to 120s on dose. HD session aborted with roughly 1 hour remaining and net negative 500cc. Multiple GOC meetings wishing for all aggressive measures to be continued. Case discussed with renal who recommended IV pressor assisted HD session. MICU called.     ANNA McdowellC  Department of Medicine/ RCU  In house RCU [place] [id number]  In house Medicine [facility] [id number]  Reachable via teams

## 2023-10-19 NOTE — PROGRESS NOTE ADULT - TIME BILLING
Medical decision making as above, physical exam, reviewing the medical record, discussion with interdisciplinary team
Review of patient records, including history, laboratory data, and imaging. Patient evaluation and assessment. Coordination of care. Time excludes teaching.
Medical decision making as above, physical exam, reviewing the medical record, discussion with interdisciplinary team
as above
Personal review of data, imaging and discussion with medical team.
Review of patient records, including history, laboratory data, and imaging. Patient evaluation and assessment. Coordination of care. Time excludes teaching.
as above
review of chart, care coordination, discussion with the team.
Medical decision making as above, reviewing the medical record, labs, imaging, discussion with family and with interdisciplinary team.
Review of patient records, including history, laboratory data, and imaging. Patient evaluation and assessment. Coordination of care. Time excludes teaching.
as above
as above
Medical decision making as above, physical exam, reviewing the medical record, discussion with interdisciplinary team
review of chart discussion with the team
MEdical management and decision making as above, reviewing chart, coordinating care with primary team/staff as well as reviewing vitals, radiology, medication list, labs, records
as above
as above
Review of patient records, including history, laboratory data, and imaging. Patient evaluation and assessment. Coordination of care. Time excludes teaching.
as above
as above
Medical decision making as above, reviewing the medical record, labs, imaging, discussion with family and with interdisciplinary team.
Review of patient records, including history, laboratory data, and imaging. Patient evaluation and assessment.   Coordination of care. Time excludes teaching.
as above
discussion with the team , care coordination, review of radiology
as above
as above
Medical decision making as above, reviewing the medical record, labs, imaging, discussion with family and with interdisciplinary team
as above
Review of patient records, including history, laboratory data, and imaging. Patient evaluation and assessment. Coordination of care. Time excludes teaching.
as above
Medical decision making as above, reviewing the medical record, labs, imaging, discussion with family and with interdisciplinary team
Review of patient records, including history, laboratory data, and imaging. Patient evaluation and assessment. Coordination of care. Time excludes teaching.
Medical decision making as above, reviewing the medical record, labs, imaging, discussion with family and with interdisciplinary team

## 2023-10-20 NOTE — PROGRESS NOTE ADULT - ASSESSMENT
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       NEUROLOGY  #AMS  Multiple etiologies including active infection vs CVA.   - MR head performed w/ new infarct and old infarcts, EEG without seizure events but with high foci potential   - c/w ASA, lipitor  - c/w keppra      CARDIOVASCULAR  # Septic vs vasoplegic shock   Etiology likely septic iso active infection. Possible component of vasoplegic   - on midodrine and droxidopa  - on Blaine, capped at 1 pressor     # HFpEF w/ decompensation   > ECHO w/ EF 59 with severe LVH and diastolic dysfunction   - fluid overloaded, HD to remove fluids     HEENT   # Left ear OE with acute on chronic left-sided otomastoiditis  - ENT evaluation (9/7) with no mastoid tenderness, however found with positive swelling and excoriation to left auricle and tenderness to manipulation of auricle. Debrided crusting of auricle and EAC evaluated with edema and unable to visualize TM. EAC debrided and found with watery exudate.   - CT IAC with acute on chronic left-sided otitis externa and acute on chronic left-sided otomastoiditis. Superimposed left-sided tympanosclerosis. 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   - HSV negative and Path (9/6) with normal appearing epithelial cells singly and in clusters devoid of viral cytopathic effect.     RESPIRATORY  # AHRF second to volume overload   - CKD vs HFpEF w/ hypercarbia 2/2 volume overload requiring intubation, trach, and HD initiation  - Continue on albuterol and chest PT  - Continue volume removal with HD      #tracheomalacia   - CT CHEST (9/8) with tracheomalacia, BL pleural effusions and continued consolidations     #mechanical ventilation   - changed vent to PC 24/35/8/40 with slight improvement in PIP (40s)     GI  # UGIB   - CGE x 1 episode on 8/24 and melena x 2 episodes on 8/31 likely residual blood.   - EGD (8/31) found with esophagitis and erosive gastropathy  - Continue on PPI BID    # Dysphagia   - NGT-TF continued   - Pending PEG however needs improvement prior to placement    RENAL  # Progressive CKD   - Presented volume overload and progressive RUSS on CKD   - HD MWF TIW with midodrine and droxidopa  - ICU for vasopressor assisted volume removal, cap to 1 pressor and not offering CRRT due to comorbidities and prognosis       INFECTIOUS DISEASE  # ESBL ECOLI and MSSA VAP c/b MSSA bacteremia   - hx of MSSA bacteremia, ESBL E. Coli sputum Cx, BAL w/ MSSA  - ECHO unable to rule out endocarditis   - treated with several rounds of antibiotics including vancomycin (c/b rashes), meropenem, and daptomycin.     # Proteus and  PSA VAP/ Trachitis   - Continued fevers and SCx (9/29) with MDR  PSA and Proteus   - Continued on Bradford as above however noted with resistance and changed to Zosyn (10/2 - 10/5) with course complicated by possible drug rxn. Zosyn changed to Aztreonam (10/5 - 10/6) however RPT SCx (10/3) with  PSA however only intermediate coverage to aztreonam and amikacin.  PSA grossly resistant to other antibiotics other than cephalosporins however patient with reactions in the past. Case discussed with ID and ID pharmacist and change to Polymyxin (10/6 - 10/15) however continues to spike fevers likely second to Polymyxin only intermediate coverage and Recarbio resistant .   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  - HH remained labile requiring xfusion (10/15) however noted with questionable reaction with rash and BB called. Work up being sent.   - Monitor HH     VASCULAR   # LLE POP DVT   - US BL LE (9/10) with acute left deep vein thrombosis of the left popliteal vein.  - RUE swollen and VA DOPPLER RUE (10/3) with R IJ DVT and R brachial DVT.  - Eliquis held for permacath however procedure held.   - Eliquis remains on hold second to mild suction trauma hemoptysis.   - Resume Eliquis when able    # HD access  - TRISHA TAYLOR (9/27 - )     ONC   # Hx of Breast CA   - Patient dx in 2018 and s/p BL mastectomy (radical on right) and chemo and RT.   - Supportive care.     ENDOCRINE  # IDDM2   - Continued on NPH with ISS, however noted with hypoglycemic episodes and NPH dc'ed.    - Finger sticks trending up off NPH.   - Continue on NPH 6U with moderate ISS.   - Adjust as need     SKIN  # Drug Eruption   - Attempted cefepime (8/12) vs ancef (8/13-8/14) and then noted with drug rxn in ICU. Continue on steroids with prednisone 40 (8/18 - 9/2) then prednisone 30 (9/3-9/7), then prednisone 20 (9/8 - 9/10), then prednisone 10mg (9/11-9/13) then turn off.   - Attempted Zosyn (10/2-10/5) with possible rash. Zosyn turned off with improvement.   - Hold further cephalosporins or PCNs at this time   - Monitor for further drug rxns.     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       NEUROLOGY  #AMS  Multiple etiologies including active infection vs CVA.   - MR head performed w/ new infarct and old infarcts, EEG without seizure events but with high foci potential   - c/w ASA, lipitor  - c/w keppra      CARDIOVASCULAR  # Septic vs vasoplegic shock   Etiology likely septic iso active infection. Possible component of vasoplegic   - on midodrine and droxidopa  - on levo, capped at 1 pressor     # HFpEF w/ decompensation   > ECHO w/ EF 59 with severe LVH and diastolic dysfunction   - fluid overloaded, HD to remove fluids     HEENT   # Left ear OE with acute on chronic left-sided otomastoiditis  - ENT evaluation (9/7) with no mastoid tenderness, however found with positive swelling and excoriation to left auricle and tenderness to manipulation of auricle. Debrided crusting of auricle and EAC evaluated with edema and unable to visualize TM. EAC debrided and found with watery exudate.   - CT IAC with acute on chronic left-sided otitis externa and acute on chronic left-sided otomastoiditis. Superimposed left-sided tympanosclerosis. 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   - HSV negative and Path (9/6) with normal appearing epithelial cells singly and in clusters devoid of viral cytopathic effect.     RESPIRATORY  # AHRF second to volume overload   - CKD vs HFpEF w/ hypercarbia 2/2 volume overload requiring intubation, trach, and HD initiation  - Continue on albuterol and chest PT  - Continue volume removal with HD      #tracheomalacia   - CT CHEST (9/8) with tracheomalacia, BL pleural effusions and continued consolidations     #mechanical ventilation   - changed vent to PC 24/35/8/40 with slight improvement in PIP (40s)     GI  # UGIB   - CGE x 1 episode on 8/24 and melena x 2 episodes on 8/31 likely residual blood.   - EGD (8/31) found with esophagitis and erosive gastropathy  - Continue on PPI BID    # Dysphagia   - NGT-TF continued   - Pending PEG however needs improvement prior to placement    RENAL  # Progressive CKD   - Presented volume overload and progressive RUSS on CKD   - HD MWF TIW with midodrine and droxidopa  - ICU for vasopressor assisted volume removal, cap to 1 pressor and not offering CRRT due to comorbidities and prognosis       INFECTIOUS DISEASE  # ESBL ECOLI and MSSA VAP c/b MSSA bacteremia   - hx of MSSA bacteremia, ESBL E. Coli sputum Cx, BAL w/ MSSA  - ECHO unable to rule out endocarditis   - treated with several rounds of antibiotics including vancomycin (c/b rashes), meropenem, and daptomycin.     # Proteus and  PSA VAP/ Trachitis   - Continued fevers and SCx (9/29) with MDR  PSA and Proteus   - Continued on Bradford as above however noted with resistance and changed to Zosyn (10/2 - 10/5) with course complicated by possible drug rxn. Zosyn changed to Aztreonam (10/5 - 10/6) however RPT SCx (10/3) with  PSA however only intermediate coverage to aztreonam and amikacin.  PSA grossly resistant to other antibiotics other than cephalosporins however patient with reactions in the past. Case discussed with ID and ID pharmacist and change to Polymyxin (10/6 - 10/15) however continues to spike fevers likely second to Polymyxin only intermediate coverage and Recarbio resistant .   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  - HH remained labile requiring xfusion (10/15) however noted with questionable reaction with rash and BB called. Work up being sent.   - Monitor HH     VASCULAR   # LLE POP DVT   - US BL LE (9/10) with acute left deep vein thrombosis of the left popliteal vein.  - RUE swollen and VA DOPPLER RUE (10/3) with R IJ DVT and R brachial DVT.  - Eliquis held for permacath however procedure held.   - Eliquis remains on hold second to mild suction trauma hemoptysis.   - Resume Eliquis when able    # HD access  - TRISHA TAYLOR (9/27 - )     ONC   # Hx of Breast CA   - Patient dx in 2018 and s/p BL mastectomy (radical on right) and chemo and RT.   - Supportive care.     ENDOCRINE  # IDDM2   - Continued on NPH with ISS, however noted with hypoglycemic episodes and NPH dc'ed.    - Finger sticks trending up off NPH.   - Continue on NPH 6U with moderate ISS.   - Adjust as need     SKIN  # Drug Eruption   - Attempted cefepime (8/12) vs ancef (8/13-8/14) and then noted with drug rxn in ICU. Continue on steroids with prednisone 40 (8/18 - 9/2) then prednisone 30 (9/3-9/7), then prednisone 20 (9/8 - 9/10), then prednisone 10mg (9/11-9/13) then turn off.   - Attempted Zosyn (10/2-10/5) with possible rash. Zosyn turned off with improvement.   - Hold further cephalosporins or PCNs at this time   - Monitor for further drug rxns.     ETHICS/ GOC    - Attempted palliative discussion however family not interested and wishes for FULL CODE  - Family continues to want everything done despite HD failure on multiple oral pressors    DISPO - TBD

## 2023-10-20 NOTE — PROGRESS NOTE ADULT - ATTENDING COMMENTS
72 yo F (wheelchair bound prior to admission) with HFpEF, T2DM, CKD, LTBI (s/p tx), hx breast ca (2018, s/p mastectomy and adjuvant CT/RT), and hx prior CVA s/p trach/PEG (decannulated prior to admission, ILR in place) who was initially admitted on 8/11/23 after p/w with acute hypoxemic and hypercapnic respiratory failure thought to be 2/2 Aspiration PNA vs ADHF/flash pulmonary edema in setting of hypertensive emergency requiring intubation and mechanical ventilationt and prolonged ICU admission which was complicated by new right cerebellar, midbrain, and multiple tiny embolic infarcts (known left PCA CVA), UGIB (s/p EGD 8/31 which showed esophagitis/erosive gastropathy now on PPI BID, ASA held), ARF and AIN (s/p prednisone) requiring dialysis (has been on and off during admission), failure to wean from ventilator s/p tracheostomy placement (IP 9/1) and RCU transfer for further management.   Her hospital course has also been marked by recurrent infections including MSSA bacteremia and MSSA/ESBL EColi in BAL, L otitis externa (9/5) s/p ENT debridement x2 (last 9/21) c/b concern for superimposed candida infection. She had a return of fever and had a repeat sputum with  PSA on Aztroname (10/16-10/25) planned for 10 day course.   She has had repeated MICU stays for hypotension and multiorgan failure.     She now presents back to the ICU for persistent hypotension without shock and need for vasopressor support with dialysis.     Lines:   SHAKIRA Hogan 9/2023    Vent settings: ACPC 24/35/8/35%; -270; Dynamic compliance ~7-8; VBG 7.34/53    N: Metabolic encephalopathy, multiple CVA's, dementia, ? seizure (no focus appreciated on EEG but multiple areas of epileptogenic potential)   - Neuro following, appreciate input  CV: Hypotension, unclear etiology. ? vasoplegia in the setting of multiple infections, prolonged hospitalization and deconditioning. No evidence of antonina shock.  MAP goal >65  - Plan for vasopressor support with dialysis, otherwise does not require vasopressors  P: Acute respiratory failure requiring trach and mechanical ventilator support.   Elevated airway pressure from dramatically reduced lung compliance, obesity  Suspect related to history of multifocal pneumonia, pulmonary edema, compressive atalectasis from bilateral effusions, obesity  - Continue ventilator support, minimize pressures as able  R: ARF requiring dialysis  - Ashlee placed 9/27 (planned for IR permcath once free of infection)  - Discussion with renal attg today. We will plan to pursue dialysis with vasopressor support, if unable to tolerate, she will not be a candidate for CRRT or further HD.   GI: History fo UGIB, now stable  ID: ESBL EColi and MSSA VAP c/b MSSA bacteremia  Proteus and  PSA VAP/tracheitis  - Now on Aztreonam, planned for 10 day course  Heme:   Significant eosinophilia (1200): ? etiology. Has happened intermittently during her hospitalizations with rashes which was presumably secondary to drug reactions. LFT relatively normal.  LLE DVT, RIJ and Brachial DVT  Anemia chronic disease, renal failure  H/O breast cancer  Derm: Multiple drug reactions during hospitalization: Cefepime v Ancef (8/2023), Zosyn (9/2023)  - Hold further cephalosporins    Full Code. Multiple goals of care discussions. Will attempt dialysis with vasopressor support. Not candidate for CRRT.

## 2023-10-20 NOTE — PROGRESS NOTE ADULT - SUBJECTIVE AND OBJECTIVE BOX
Progress Note    08-11-23 (70d)    Patient is a 73y old  Female who presents with a chief complaint of Respiratory distress (19 Oct 2023 20:00)      Subjective / Overnight Events :  - No acute events overnight.  - Pt seen and examined at bedside.     Additional ROS (if any):    MEDICATIONS  (STANDING):  albuterol    0.083% 2.5 milliGRAM(s) Nebulizer every 6 hours  alteplase for catheter clearance 2 milliGRAM(s) Catheter once  apixaban 5 milliGRAM(s) Oral every 12 hours  artificial tears (preservative free) Ophthalmic Solution 1 Drop(s) Both EYES every 4 hours  atorvastatin 10 milliGRAM(s) Oral at bedtime  aztreonam  IVPB 2000 milliGRAM(s) IV Intermittent <User Schedule>  chlorhexidine 0.12% Liquid 15 milliLiter(s) Oral Mucosa every 12 hours  chlorhexidine 2% Cloths 1 Application(s) Topical daily  dextrose 5%. 1000 milliLiter(s) (50 mL/Hr) IV Continuous <Continuous>  dextrose 5%. 1000 milliLiter(s) (100 mL/Hr) IV Continuous <Continuous>  dextrose 50% Injectable 12.5 Gram(s) IV Push once  dextrose 50% Injectable 25 Gram(s) IV Push once  dextrose 50% Injectable 25 Gram(s) IV Push once  dextrose 50% Injectable 25 Gram(s) IV Push once  droxidopa 100 milliGRAM(s) Oral three times a day  epoetin odalis (EPOGEN) Injectable 42612 Unit(s) IV Push <User Schedule>  ferrous    sulfate Liquid 300 milliGRAM(s) Oral daily  glucagon  Injectable 1 milliGRAM(s) IntraMuscular once  insulin lispro (ADMELOG) corrective regimen sliding scale   SubCutaneous every 6 hours  insulin NPH human recombinant 6 Unit(s) SubCutaneous every 6 hours  levETIRAcetam  Solution 1000 milliGRAM(s) Oral daily  levETIRAcetam  Solution 250 milliGRAM(s) Oral <User Schedule>  midodrine. 30 milliGRAM(s) Oral every 8 hours  pantoprazole  Injectable 40 milliGRAM(s) IV Push two times a day  petrolatum Ophthalmic Ointment 1 Application(s) Both EYES four times a day  phenylephrine    Infusion 0.1 MICROgram(s)/kG/Min (2.49 mL/Hr) IV Continuous <Continuous>  sodium chloride 1 Gram(s) Oral two times a day    MEDICATIONS  (PRN):  acetaminophen   Oral Liquid .. 650 milliGRAM(s) Oral every 6 hours PRN Temp greater or equal to 38C (100.4F), Mild Pain (1 - 3)  dextrose Oral Gel 15 Gram(s) Oral once PRN Blood Glucose LESS THAN 70 milliGRAM(s)/deciliter  diphenhydrAMINE Elixir 50 milliGRAM(s) Oral once PRN Rash and/or Itching  sodium chloride 0.9% Bolus. 250 milliLiter(s) IV Bolus every 5 minutes PRN SBP LESS THAN or EQUAL to 90 mmHg  sodium chloride 0.9% lock flush 10 milliLiter(s) IV Push every 1 hour PRN Pre/post blood products, medications, blood draw, and to maintain line patency          PHYSICAL EXAM:  Vital Signs Last 24 Hrs  T(C): 37.7 (20 Oct 2023 04:00), Max: 37.7 (20 Oct 2023 04:00)  T(F): 99.8 (20 Oct 2023 04:00), Max: 99.8 (20 Oct 2023 04:00)  HR: 115 (20 Oct 2023 06:00) (100 - 126)  BP: 134/45 (20 Oct 2023 06:00) (62/40 - 134/45)  BP(mean): 69 (20 Oct 2023 06:00) (42 - 94)  RR: 24 (20 Oct 2023 06:00) (19 - 24)  SpO2: 100% (20 Oct 2023 06:00) (91% - 118%)    Parameters below as of 20 Oct 2023 06:00  Patient On (Oxygen Delivery Method): ventilator    O2 Concentration (%): 35    I&O's Summary    19 Oct 2023 07:01  -  20 Oct 2023 07:00  --------------------------------------------------------  IN: 425 mL / OUT: 900 mL / NET: -475 mL        General: NAD, non-toxic appearing   HEENT: PERRLA, EOMi, no scleral icterus  CV: RRR, normal S1 and S2, no m/r/g  Lungs: normal respiratory effort. CTAB, no wheezes, rales, or rhonchi  Abd: soft, nontender, nondistended  Ext: no edema, 2+ peripheral pulses   Pysch: AAOx3, appropriate affect   Neuro: grossly non-focal, moving all extremities spontaneously   Skin: no rashes or lesions     LABS:  CAPILLARY BLOOD GLUCOSE      POCT Blood Glucose.: 109 mg/dL (20 Oct 2023 05:50)  POCT Blood Glucose.: 135 mg/dL (19 Oct 2023 23:41)  POCT Blood Glucose.: 129 mg/dL (19 Oct 2023 17:11)  POCT Blood Glucose.: 140 mg/dL (19 Oct 2023 11:16)                              7.9    13.37 )-----------( 473      ( 20 Oct 2023 00:30 )             26.5       WBC Trend: 13.37<--, 15.27<--, 14.17<--  Hb Trend: 7.9<--, 7.8<--, 7.5<--, 9.0<--, 6.8<--    10-20    135  |  99  |  26<H>  ----------------------------<  126<H>  4.5   |  25  |  1.39<H>    Ca    8.6      20 Oct 2023 00:30  Phos  3.8     10-20  Mg     2.20     10-20    TPro  5.9<L>  /  Alb  2.3<L>  /  TBili  0.3  /  DBili  x   /  AST  17  /  ALT  11  /  AlkPhos  278<H>  10-20    PT/INR - ( 20 Oct 2023 00:30 )   PT: 17.9 sec;   INR: 1.61 ratio         PTT - ( 20 Oct 2023 00:30 )  PTT:32.2 sec      Urinalysis Basic - ( 20 Oct 2023 00:30 )    Color: x / Appearance: x / SG: x / pH: x  Gluc: 126 mg/dL / Ketone: x  / Bili: x / Urobili: x   Blood: x / Protein: x / Nitrite: x   Leuk Esterase: x / RBC: x / WBC x   Sq Epi: x / Non Sq Epi: x / Bacteria: x            RADIOLOGY & ADDITIONAL TESTS: Reviewed Progress Note    08-11-23 (70d)    Patient is a 73y old  Female who presents with a chief complaint of Respiratory distress (19 Oct 2023 20:00)      Subjective / Overnight Events :  - No acute events overnight.  - Pt seen and examined at bedside.     Additional ROS (if any):    MEDICATIONS  (STANDING):  albuterol    0.083% 2.5 milliGRAM(s) Nebulizer every 6 hours  alteplase for catheter clearance 2 milliGRAM(s) Catheter once  apixaban 5 milliGRAM(s) Oral every 12 hours  artificial tears (preservative free) Ophthalmic Solution 1 Drop(s) Both EYES every 4 hours  atorvastatin 10 milliGRAM(s) Oral at bedtime  aztreonam  IVPB 2000 milliGRAM(s) IV Intermittent <User Schedule>  chlorhexidine 0.12% Liquid 15 milliLiter(s) Oral Mucosa every 12 hours  chlorhexidine 2% Cloths 1 Application(s) Topical daily  dextrose 5%. 1000 milliLiter(s) (50 mL/Hr) IV Continuous <Continuous>  dextrose 5%. 1000 milliLiter(s) (100 mL/Hr) IV Continuous <Continuous>  dextrose 50% Injectable 12.5 Gram(s) IV Push once  dextrose 50% Injectable 25 Gram(s) IV Push once  dextrose 50% Injectable 25 Gram(s) IV Push once  dextrose 50% Injectable 25 Gram(s) IV Push once  droxidopa 100 milliGRAM(s) Oral three times a day  epoetin odalis (EPOGEN) Injectable 93771 Unit(s) IV Push <User Schedule>  ferrous    sulfate Liquid 300 milliGRAM(s) Oral daily  glucagon  Injectable 1 milliGRAM(s) IntraMuscular once  insulin lispro (ADMELOG) corrective regimen sliding scale   SubCutaneous every 6 hours  insulin NPH human recombinant 6 Unit(s) SubCutaneous every 6 hours  levETIRAcetam  Solution 1000 milliGRAM(s) Oral daily  levETIRAcetam  Solution 250 milliGRAM(s) Oral <User Schedule>  midodrine. 30 milliGRAM(s) Oral every 8 hours  pantoprazole  Injectable 40 milliGRAM(s) IV Push two times a day  petrolatum Ophthalmic Ointment 1 Application(s) Both EYES four times a day  phenylephrine    Infusion 0.1 MICROgram(s)/kG/Min (2.49 mL/Hr) IV Continuous <Continuous>  sodium chloride 1 Gram(s) Oral two times a day    MEDICATIONS  (PRN):  acetaminophen   Oral Liquid .. 650 milliGRAM(s) Oral every 6 hours PRN Temp greater or equal to 38C (100.4F), Mild Pain (1 - 3)  dextrose Oral Gel 15 Gram(s) Oral once PRN Blood Glucose LESS THAN 70 milliGRAM(s)/deciliter  diphenhydrAMINE Elixir 50 milliGRAM(s) Oral once PRN Rash and/or Itching  sodium chloride 0.9% Bolus. 250 milliLiter(s) IV Bolus every 5 minutes PRN SBP LESS THAN or EQUAL to 90 mmHg  sodium chloride 0.9% lock flush 10 milliLiter(s) IV Push every 1 hour PRN Pre/post blood products, medications, blood draw, and to maintain line patency          PHYSICAL EXAM:  Vital Signs Last 24 Hrs  T(C): 37.7 (20 Oct 2023 04:00), Max: 37.7 (20 Oct 2023 04:00)  T(F): 99.8 (20 Oct 2023 04:00), Max: 99.8 (20 Oct 2023 04:00)  HR: 115 (20 Oct 2023 06:00) (100 - 126)  BP: 134/45 (20 Oct 2023 06:00) (62/40 - 134/45)  BP(mean): 69 (20 Oct 2023 06:00) (42 - 94)  RR: 24 (20 Oct 2023 06:00) (19 - 24)  SpO2: 100% (20 Oct 2023 06:00) (91% - 118%)    Parameters below as of 20 Oct 2023 06:00  Patient On (Oxygen Delivery Method): ventilator    O2 Concentration (%): 35    I&O's Summary    19 Oct 2023 07:01  -  20 Oct 2023 07:00  --------------------------------------------------------  IN: 425 mL / OUT: 900 mL / NET: -475 mL        General: intubated, minimal response   HEENT: PERRLA, EOMi, no scleral icterus  CV: +tachycardia   Lungs: +coarse breath sounds bilaterally   Abd: soft, nontender, nondistended  Ext: 2+ pitting edema bilaterally   Skin: +diffuse rash     LABS:  CAPILLARY BLOOD GLUCOSE      POCT Blood Glucose.: 109 mg/dL (20 Oct 2023 05:50)  POCT Blood Glucose.: 135 mg/dL (19 Oct 2023 23:41)  POCT Blood Glucose.: 129 mg/dL (19 Oct 2023 17:11)  POCT Blood Glucose.: 140 mg/dL (19 Oct 2023 11:16)                              7.9    13.37 )-----------( 473      ( 20 Oct 2023 00:30 )             26.5       WBC Trend: 13.37<--, 15.27<--, 14.17<--  Hb Trend: 7.9<--, 7.8<--, 7.5<--, 9.0<--, 6.8<--    10-20    135  |  99  |  26<H>  ----------------------------<  126<H>  4.5   |  25  |  1.39<H>    Ca    8.6      20 Oct 2023 00:30  Phos  3.8     10-20  Mg     2.20     10-20    TPro  5.9<L>  /  Alb  2.3<L>  /  TBili  0.3  /  DBili  x   /  AST  17  /  ALT  11  /  AlkPhos  278<H>  10-20    PT/INR - ( 20 Oct 2023 00:30 )   PT: 17.9 sec;   INR: 1.61 ratio         PTT - ( 20 Oct 2023 00:30 )  PTT:32.2 sec      Urinalysis Basic - ( 20 Oct 2023 00:30 )    Color: x / Appearance: x / SG: x / pH: x  Gluc: 126 mg/dL / Ketone: x  / Bili: x / Urobili: x   Blood: x / Protein: x / Nitrite: x   Leuk Esterase: x / RBC: x / WBC x   Sq Epi: x / Non Sq Epi: x / Bacteria: x            RADIOLOGY & ADDITIONAL TESTS: Reviewed

## 2023-10-20 NOTE — PROGRESS NOTE ADULT - ASSESSMENT
73 f with DM, HTN, CKD, breast CA, latent TB (treated first with INH then had rash and switched to rifampin), MSSA bacteremia, c-diff, respiratory arrest and cardiac arrest (2018), s/p trach/PEG then removed, CVA with residual weakness, aspiration PNA, 1/4 sputums had MAC 7/2023, admitted 8/11 with respiratory failure no fever or WBC but was intubated and then spiked  blood cx negative, sputum cx with MSSA  developed rash and renal failure after cefepime, was on HD then discontinued became uremic and now again on HD  head MRI 8/17 with acute cerebellar infarct  had renal failure, AIN? family declined renal biopsy started on prednisone  s/p trach 9/1 and BAL with MSSA   ET cx with ESBL and MSSA  blood cx 9/1: MSSA with hypotension  repeat negative 9/4, 9/8, TTE no veg s/p 4 weeks of vanco/dapto through 10/2  L ear edema and otitis externa, s/p a long course of ana cristina, the last cx showed candida and ENT stated it could be fungal (saw black spores?) so was also given 7 days of fluconazole  pt had a rash again, unclear what caused it, fluconazole  still with intermittent fevers and then sputum cx with carbapenem resistant pseudomonas, s/p a course of polymixin  no improvement in mental status and ?seizure as well, neuro following  had drop in Hgb s/p transfusion and then had again febrile 10/15-10.17 with leukocytosis, rash and eosinophilia, sputum growing pseudomonas and proteus  (both sensitive to aztreonam) ?VAP vs a transfusion reaction  hypotension during HD, now transferred to MICU     * aztreonam renally dosed, will likely complete a 10 day course, started 10/17, now day 4  * monitor CBC/diff and CMP        The above assessment and plan was discussed with the primary team    Yumiko Conner MD  contact on teams  After 5pm and on weekends call [phone]

## 2023-10-20 NOTE — PROGRESS NOTE ADULT - SUBJECTIVE AND OBJECTIVE BOX
S:  Pt seen and examined. Transferred to MICU overnight for hypotension    Medications: MEDICATIONS  (STANDING):  albuterol    0.083% 2.5 milliGRAM(s) Nebulizer every 6 hours  alteplase for catheter clearance 2 milliGRAM(s) Catheter once  alteplase for catheter clearance 2 milliGRAM(s) Catheter once  apixaban 5 milliGRAM(s) Oral every 12 hours  artificial tears (preservative free) Ophthalmic Solution 1 Drop(s) Both EYES every 4 hours  atorvastatin 10 milliGRAM(s) Oral at bedtime  aztreonam  IVPB 2000 milliGRAM(s) IV Intermittent <User Schedule>  chlorhexidine 0.12% Liquid 15 milliLiter(s) Oral Mucosa every 12 hours  chlorhexidine 2% Cloths 1 Application(s) Topical daily  dextrose 5%. 1000 milliLiter(s) (100 mL/Hr) IV Continuous <Continuous>  dextrose 5%. 1000 milliLiter(s) (50 mL/Hr) IV Continuous <Continuous>  dextrose 50% Injectable 25 Gram(s) IV Push once  dextrose 50% Injectable 25 Gram(s) IV Push once  dextrose 50% Injectable 25 Gram(s) IV Push once  dextrose 50% Injectable 12.5 Gram(s) IV Push once  droxidopa 100 milliGRAM(s) Oral three times a day  epoetin odalis (EPOGEN) Injectable 04533 Unit(s) IV Push <User Schedule>  ferrous    sulfate Liquid 300 milliGRAM(s) Oral daily  glucagon  Injectable 1 milliGRAM(s) IntraMuscular once  insulin lispro (ADMELOG) corrective regimen sliding scale   SubCutaneous every 6 hours  insulin NPH human recombinant 6 Unit(s) SubCutaneous every 6 hours  levETIRAcetam  Solution 1000 milliGRAM(s) Oral daily  levETIRAcetam  Solution 250 milliGRAM(s) Oral <User Schedule>  midodrine. 30 milliGRAM(s) Oral every 8 hours  norepinephrine Infusion 0.05 MICROgram(s)/kG/Min (6.23 mL/Hr) IV Continuous <Continuous>  pantoprazole  Injectable 40 milliGRAM(s) IV Push two times a day  petrolatum Ophthalmic Ointment 1 Application(s) Both EYES four times a day  sodium chloride 1 Gram(s) Oral two times a day    MEDICATIONS  (PRN):  acetaminophen   Oral Liquid .. 650 milliGRAM(s) Oral every 6 hours PRN Temp greater or equal to 38C (100.4F), Mild Pain (1 - 3)  dextrose Oral Gel 15 Gram(s) Oral once PRN Blood Glucose LESS THAN 70 milliGRAM(s)/deciliter  diphenhydrAMINE Elixir 50 milliGRAM(s) Oral once PRN Rash and/or Itching  sodium chloride 0.9% Bolus. 250 milliLiter(s) IV Bolus every 5 minutes PRN SBP LESS THAN or EQUAL to 90 mmHg  sodium chloride 0.9% lock flush 10 milliLiter(s) IV Push every 1 hour PRN Pre/post blood products, medications, blood draw, and to maintain line patency       Vitals:  Vital Signs Last 24 Hrs  T(C): 37 (20 Oct 2023 08:00), Max: 37.7 (20 Oct 2023 04:00)  T(F): 98.6 (20 Oct 2023 08:00), Max: 99.8 (20 Oct 2023 04:00)  HR: 110 (20 Oct 2023 09:00) (110 - 126)  BP: 128/49 (20 Oct 2023 09:00) (62/40 - 134/45)  BP(mean): 70 (20 Oct 2023 09:00) (42 - 94)  RR: 24 (20 Oct 2023 09:00) (19 - 24)  SpO2: 100% (20 Oct 2023 09:00) (91% - 118%)    Parameters below as of 20 Oct 2023 11:18  Patient On (Oxygen Delivery Method): ventilator          Neurological Exam:  Mental Status: Eyes closed, off sedation. Does not follow commands,  + trach to vent and NGT   Cranial Nerves: PERRL slightly sluggish, L subconjunctival hemorrhage improved  Motor: Moves upper extremities spontaneously R >L, tremor R > L  no movement noted in lowers  Sensation: WD to noxious x4    I personally reviewed the below data/images/labs:       LABS:                          7.9    13.37 )-----------( 473      ( 20 Oct 2023 00:30 )             26.5     10-20    135  |  99  |  26<H>  ----------------------------<  126<H>  4.5   |  25  |  1.39<H>    Ca    8.6      20 Oct 2023 00:30  Phos  3.8     10-20  Mg     2.20     10-20    TPro  5.9<L>  /  Alb  2.3<L>  /  TBili  0.3  /  DBili  x   /  AST  17  /  ALT  11  /  AlkPhos  278<H>  10-20    LIVER FUNCTIONS - ( 20 Oct 2023 00:30 )  Alb: 2.3 g/dL / Pro: 5.9 g/dL / ALK PHOS: 278 U/L / ALT: 11 U/L / AST: 17 U/L / GGT: x           PT/INR - ( 20 Oct 2023 00:30 )   PT: 17.9 sec;   INR: 1.61 ratio         PTT - ( 20 Oct 2023 00:30 )  PTT:32.2 sec  Urinalysis Basic - ( 20 Oct 2023 00:30 )    Color: x / Appearance: x / SG: x / pH: x  Gluc: 126 mg/dL / Ketone: x  / Bili: x / Urobili: x   Blood: x / Protein: x / Nitrite: x   Leuk Esterase: x / RBC: x / WBC x   Sq Epi: x / Non Sq Epi: x / Bacteria: x          < from: CT Head No Cont (08.15.23 @ 17:20) >    ACC: [id number] EXAM:  CT BRAIN   ORDERED BY: MINAL SAM     PROCEDURE DATE:  08/15/2023          INTERPRETATION:  Clinical indication: Change in neuro exam.    Multiple axial sections were performed from base to vertex without   contrast enhancement. Coronal and sagittal reconstructions were   reformatted well.    This exam is compared prior head CT performed on August 11, 2023    Parenchymal volume loss and chronic microvessel ischemic changes are   again seen.    Abnormal low-attenuation involving the left occipital cortical   subcortical region is again seen. 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    No evidence of acute hemorrhage mass or mass effect is seen.    Evaluation of the osseous structures with appropriate window demonstrates   sclerotic changes about the left mastoid region which appears stable.   Opacification left middle ear region is again seen.    Patient is status post bilateral cataract surgery.    Impression: Stable exam.    < end of copied text >    vEEG:    Clinical Impression:  - Severe diffuse non-specific cerebral dysfunction  - There were no epileptiform abnormalities or seizures recorded.        CTH 8/11:    VENTRICLES AND SULCI: Age-appropriate involutional change  INTRA-AXIAL:  Old left PCA infarct as seen on the prior unchanged.   Microvascular ischemic changes involving the periventricular and   subcortical white matter as seen previously  EXTRA-AXIAL:  No mass or collection is seen.  VISUALIZED SINUSES:  Clear.  VISUALIZED MASTOIDS: Left mastoid sclerosis  CALVARIUM: Infiltrative appearance tothe calvarium may be indicative of   marrow infiltration on the basis of patient's known diagnosis of breast   cancer. MISCELLANEOUS:  None.    IMPRESSION:  No significant interval change compared with 7/17/2023 in   left PCA infarct which is old. Microvascular ischemic changes involving   the periventricular and subcortical white matter as seen   previously.Questionable lesions at the level of the calvarium related to   possible breast CA. Clinical correlation recommended.    --- End of Report ---      ct< from: CT Head No Cont (09.26.23 @ 21:02) >  IMPRESSION: Vague questionable areas of hypoattenuation within the   bilateral cerebellar hemispheres which may be compatible with   acute/subacute ischemia. Recommend further evaluation with a brain MRI   study, provided there are no MRI contraindications.    No acute intracranial hemorrhage.    Previously seen questionable vague wedge-shaped area of hypoattenuation   in the left parietal lobe with artifactual secondary to motion and volume   averaging with a prominent sulcus.    Chronic left occipital lobe infarct.    < end of copied text >    < from: CT Head No Cont (10.03.23 @ 20:46) >    IMPRESSION:    No acute intracranial hemorrhage or mass effect. Evaluation of the   posterior fossa degraded by streak artifact from dental amalgam.   Lucencies in the bilateral cerebellum may be artifactual.    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    Bilateral middle ear and mastoid effusions which are also seen on prior   exam.    EEG Classification / Summary:  Abnormal EEG in the awake, drowsy states.   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   -No seizures

## 2023-10-20 NOTE — PROGRESS NOTE ADULT - ASSESSMENT
IMPRESSION: 73F w/ HTN, DM2, CVA, breast CA-bilateral mastectomy, recurrent aspiration pneumonia/respiratory failure, and CKD, 8/11/23 p/w acute hypercapnic respiratory failure; c/b RUSS    (1)Renal - RUSS on CKD4 ==>now newly ESRD. s/p short HD Monday due to poor catheter flow s/p cathflo, if the catheter continues to function poorly, at some point we will have to plan to exchange it    (2)Hyponatremia - improved/stable    (3)CV- tenuous hemodynamics such with each passing week we have more difficulty performing HD/achieving UF, persistent issue.     (4)ID- BCx from 10/14/23 NGTD, BC (10/17) NGTD on IV abx     (5)Pulm- vent-dependent    (6)Anemia- hgb low, epo with HD    RECOMMEND:  (1)PUF today: 3hrs, 2kg UF as able; pressors as needed to keep SBP> 90  (2)HD tomorrow: 3hrs, 1.5kg UF as able, pressors and sodium modelling as needed to keep SBP>90; Epogen with HD   (3)Dose new meds for GFR <10/HD.     D/w Dr. Isaiah Espinoza, [facility]  [phone]        IMPRESSION: 73F w/ HTN, DM2, CVA, breast CA-bilateral mastectomy, recurrent aspiration pneumonia/respiratory failure, and CKD, 8/11/23 p/w acute hypercapnic respiratory failure; c/b RUSS    (1)Renal - RUSS on CKD4 ==>now newly ESRD. s/p short HD Monday due to poor catheter flow s/p cathflo, if the catheter continues to function poorly, at some point we will have to plan to exchange it    (2)Hyponatremia - improved/stable    (3)CV- tenuous hemodynamics such with each passing week we have more difficulty performing HD/achieving UF, persistent issue.     (4)ID- BCx from 10/14/23 NGTD, BC (10/17) NGTD on IV abx     (5)Pulm- vent-dependent    (6)Anemia- hgb low, epo with HD    RECOMMEND:  (1)PUF today: 3hrs, 2kg UF as able; pressors as needed to keep SBP> 90  (2)HD tomorrow: 3hrs, 1.5kg UF as able, pressors and sodium modelling as needed to keep SBP>90; Epogen with HD   (3)Dose new meds for GFR <10/HD.     D/w Dr. Isaiah Espinoza, [facility]  [facility]  [phone]       RENAL ATTENDING NOTE  Patient seen and examined with NP. Agree with assessment and plan as above.  On Levophed gtt. Tolerated PUF today with 2L UF. We can plan for next HD tomorrow, then HD Mon vs Tuesday.    Jimmy Colon MD  [facility]  [phone]

## 2023-10-20 NOTE — PROGRESS NOTE ADULT - SUBJECTIVE AND OBJECTIVE BOX
NEPHROLOGY     Patient seen and examined, now in MICU, spouse at bedside, pt trach to paula.     MEDICATIONS  (STANDING):  albuterol    0.083% 2.5 milliGRAM(s) Nebulizer every 6 hours  alteplase for catheter clearance 2 milliGRAM(s) Catheter once  alteplase for catheter clearance 2 milliGRAM(s) Catheter once  apixaban 5 milliGRAM(s) Oral every 12 hours  artificial tears (preservative free) Ophthalmic Solution 1 Drop(s) Both EYES every 4 hours  atorvastatin 10 milliGRAM(s) Oral at bedtime  aztreonam  IVPB 2000 milliGRAM(s) IV Intermittent <User Schedule>  chlorhexidine 0.12% Liquid 15 milliLiter(s) Oral Mucosa every 12 hours  chlorhexidine 2% Cloths 1 Application(s) Topical daily  dextrose 5%. 1000 milliLiter(s) (50 mL/Hr) IV Continuous <Continuous>  dextrose 5%. 1000 milliLiter(s) (100 mL/Hr) IV Continuous <Continuous>  dextrose 50% Injectable 12.5 Gram(s) IV Push once  dextrose 50% Injectable 25 Gram(s) IV Push once  dextrose 50% Injectable 25 Gram(s) IV Push once  dextrose 50% Injectable 25 Gram(s) IV Push once  droxidopa 100 milliGRAM(s) Oral three times a day  epoetin odalis (EPOGEN) Injectable 26347 Unit(s) IV Push <User Schedule>  ferrous    sulfate Liquid 300 milliGRAM(s) Oral daily  glucagon  Injectable 1 milliGRAM(s) IntraMuscular once  insulin lispro (ADMELOG) corrective regimen sliding scale   SubCutaneous every 6 hours  insulin NPH human recombinant 6 Unit(s) SubCutaneous every 6 hours  levETIRAcetam  Solution 1000 milliGRAM(s) Oral daily  levETIRAcetam  Solution 250 milliGRAM(s) Oral <User Schedule>  midodrine. 30 milliGRAM(s) Oral every 8 hours  pantoprazole  Injectable 40 milliGRAM(s) IV Push two times a day  petrolatum Ophthalmic Ointment 1 Application(s) Both EYES four times a day  phenylephrine    Infusion 0.1 MICROgram(s)/kG/Min (2.49 mL/Hr) IV Continuous <Continuous>  sodium chloride 1 Gram(s) Oral two times a day    VITALS:  T(C): , Max: 37.7 (10-20-23 @ 04:00)  T(F): , Max: 99.8 (10-20-23 @ 04:00)  HR: 110 (10-20-23 @ 09:00)  BP: 128/49 (10-20-23 @ 09:00)  BP(mean): 70 (10-20-23 @ 09:00)  RR: 24 (10-20-23 @ 09:00)  SpO2: 100% (10-20-23 @ 09:00)    PHYSICAL EXAM:  Constitutional: trach to vent   HEENT: + NGT + tracheostomy   Respiratory: coarse BS  Cardiovascular: S1 and S2  Gastrointestinal: BS+, soft, NT/ND  Extremities: +  peripheral edema  Neurological: UTO  : No Wheeler  Skin: No rashes  Access: [facility]     LABS:                        7.9    13.37 )-----------( 473      ( 20 Oct 2023 00:30 )             26.5     10-20    135  |  99  |  26<H>  ----------------------------<  126<H>  4.5   |  25  |  1.39<H>    Ca    8.6      20 Oct 2023 00:30  Phos  3.8     10-20  Mg     2.20     10-20    TPro  5.9<L>  /  Alb  2.3<L>  /  TBili  0.3  /  DBili  x   /  AST  17  /  ALT  11  /  AlkPhos  278<H>  10-20    RADIOLOGY & ADDITIONAL STUDIES:  rad< from: Xray Chest 1 View- PORTABLE-Urgent (Xray Chest 1 View- PORTABLE-Urgent .) (10.19.23 @ 15:54) >    IMPRESSION:    Possible left infiltrate with small effusions.    --- End of Report ---      < end of copied text >

## 2023-10-20 NOTE — PROGRESS NOTE ADULT - ASSESSMENT
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 While in MICU patient noted with worsening renal failure requiring HD initiation, CGE/ Melena s/p EGD 8/31 found with esophagitis and erosive gastropathy, new right cerebellar infarct and fevers second to ESBL COLI and MSSA VAP, MSSA bacteremia, left ear otitis externa and drug rxn to cephalosporins Course further complicated by prolonged vent time s/p tracheostomy 9/1 and transferred to RCU 9/3 completed by recurrent fevers with high PIP, respiratory acidosis and concern for volume overloaded.   CTH repeated 9/26 for concern for encephalopathy - has known now - chronic bilateral cerebellar infarcts, L PCA infarct. L parietal hypodensity seen on prior CT likely artifactual  Repeat CTH 10/3 - unchanged  EEG 10/5 with L posterocentral/temporal spikes/sharp waves - doubt true focal seizure upon further review of EEG     Impression:   ? Focal seizure - has hx of bilateral R > L tremors, now on Keppra  Metabolic encephalopathy related to shock, infection, doubt new stroke  L PCA stroke, ESUS s/p ILR, also with bilateral centrum semiovale watershed infarcts   Multiple embolic strokes on MRI 8/17 - R cerebellum, midbrain, multiple other tiny embolic infarcts.   Dementia   MSSA bacteremia, r/o endocarditis s/p Daptomycin  Acute popliteal DVT on Eliquis   Anemia     Recommendations   [] care per MICU for persistent hypotension  [] has multiple areas of epileptogenic potential on EEG, no clear seizure correlate seen. On Keppra but no improvement in mental status  [] consider MRI brain once stable but doubt will change mgmt  [] mgmt of hypotension per piumary team, remains full code  [] Keppra 500mg BID with extra 250mg after HD on HD days - will consider stopping to see if MS improves  [] Abx per ID  [] C/w home Atorvastatin 10 mg qhs   [] continue to address GOC with family, poor prognosis    Katty Charles DO  Vascular Neurology  Office [phone]

## 2023-10-20 NOTE — PROGRESS NOTE ADULT - SUBJECTIVE AND OBJECTIVE BOX
Follow Up:  MSSA bacteremia, otitis external, VAP, fever    Interval History: no fever but pt again had hypotension during HD and was transferred to MICU    ROS:  unable to obtain because: trached, AMS        Allergies  isoniazid (Rash)  nafcillin (Unknown)  hydrALAZINE (Rash)  vitamin E (Short breath; Urticaria; Hives)  doxycycline (Rash)  cefepime (Rash)  NIFEdipine (Urticaria; Hives)  Zosyn (Rash)        ANTIMICROBIALS:  aztreonam  IVPB 2000 <User Schedule>      OTHER MEDS:  acetaminophen   Oral Liquid .. 650 milliGRAM(s) Oral every 6 hours PRN  albuterol    0.083% 2.5 milliGRAM(s) Nebulizer every 6 hours  alteplase for catheter clearance 2 milliGRAM(s) Catheter once  alteplase for catheter clearance 2 milliGRAM(s) Catheter once  apixaban 5 milliGRAM(s) Oral every 12 hours  artificial tears (preservative free) Ophthalmic Solution 1 Drop(s) Both EYES every 4 hours  atorvastatin 10 milliGRAM(s) Oral at bedtime  chlorhexidine 0.12% Liquid 15 milliLiter(s) Oral Mucosa every 12 hours  chlorhexidine 2% Cloths 1 Application(s) Topical daily  dextrose 5%. 1000 milliLiter(s) IV Continuous <Continuous>  dextrose 5%. 1000 milliLiter(s) IV Continuous <Continuous>  dextrose 50% Injectable 12.5 Gram(s) IV Push once  dextrose 50% Injectable 25 Gram(s) IV Push once  dextrose 50% Injectable 25 Gram(s) IV Push once  dextrose 50% Injectable 25 Gram(s) IV Push once  dextrose Oral Gel 15 Gram(s) Oral once PRN  diphenhydrAMINE Elixir 50 milliGRAM(s) Oral once PRN  droxidopa 100 milliGRAM(s) Oral three times a day  epoetin odalis (EPOGEN) Injectable 51633 Unit(s) IV Push <User Schedule>  ferrous    sulfate Liquid 300 milliGRAM(s) Oral daily  glucagon  Injectable 1 milliGRAM(s) IntraMuscular once  insulin lispro (ADMELOG) corrective regimen sliding scale   SubCutaneous every 6 hours  insulin NPH human recombinant 6 Unit(s) SubCutaneous every 6 hours  levETIRAcetam  Solution 1000 milliGRAM(s) Oral daily  levETIRAcetam  Solution 250 milliGRAM(s) Oral <User Schedule>  midodrine. 30 milliGRAM(s) Oral every 8 hours  norepinephrine Infusion 0.05 MICROgram(s)/kG/Min IV Continuous <Continuous>  pantoprazole  Injectable 40 milliGRAM(s) IV Push two times a day  petrolatum Ophthalmic Ointment 1 Application(s) Both EYES four times a day  sodium chloride 1 Gram(s) Oral two times a day  sodium chloride 0.9% Bolus. 250 milliLiter(s) IV Bolus every 5 minutes PRN  sodium chloride 0.9% lock flush 10 milliLiter(s) IV Push every 1 hour PRN      Vital Signs Last 24 Hrs  T(C): 37 (20 Oct 2023 08:00), Max: 37.7 (20 Oct 2023 04:00)  T(F): 98.6 (20 Oct 2023 08:00), Max: 99.8 (20 Oct 2023 04:00)  HR: 121 (20 Oct 2023 11:30) (110 - 126)  BP: 128/49 (20 Oct 2023 09:00) (62/40 - 134/45)  BP(mean): 70 (20 Oct 2023 09:00) (42 - 94)  RR: 24 (20 Oct 2023 09:00) (19 - 24)  SpO2: 94% (20 Oct 2023 11:30) (91% - 118%)    Parameters below as of 20 Oct 2023 11:18  Patient On (Oxygen Delivery Method): ventilator        Physical Exam:  General:    trached   Respiratory:   on vent  abd:  distended but soft  :  no  willard  Musculoskeletal : generalized edema  Skin: erythematous rash on chest, UE, LEs , face also erythematous  vascular: TRISHA odom                           7.9    13.37 )-----------( 473      ( 20 Oct 2023 00:30 )             26.5       10-20    135  |  99  |  26<H>  ----------------------------<  126<H>  4.5   |  25  |  1.39<H>    Ca    8.6      20 Oct 2023 00:30  Phos  3.8     10-20  Mg     2.20     10-20    TPro  5.9<L>  /  Alb  2.3<L>  /  TBili  0.3  /  DBili  x   /  AST  17  /  ALT  11  /  AlkPhos  278<H>  10-20      Urinalysis Basic - ( 20 Oct 2023 00:30 )    Color: x / Appearance: x / SG: x / pH: x  Gluc: 126 mg/dL / Ketone: x  / Bili: x / Urobili: x   Blood: x / Protein: x / Nitrite: x   Leuk Esterase: x / RBC: x / WBC x   Sq Epi: x / Non Sq Epi: x / Bacteria: x        MICROBIOLOGY:  v  .Blood Blood-Venous  10-17-23   No growth at 48 Hours  --  --      .Blood Blood-Peripheral  10-14-23   No growth at 5 days  --  --      Trach Asp Tracheal Aspirate  10-14-23   Numerous Pseudomonas aeruginosa (Carbapenem Resistant)  Numerous Proteus mirabilis  Normal Respiratory Cate present  --  Pseudomonas aeruginosa (Carbapenem Resistant)  Proteus mirabilis      .Blood Blood-Venous  10-09-23   No growth at 5 days  --  --      Trach Asp Tracheal Aspirate  10-03-23   Numerous Pseudomonas aeruginosa (Carbapenem Resistant) Susceptibility to  follow.  Normal Respiratory Cate absent  --  Pseudomonas aeruginosa (Carbapenem Resistant)      .Blood Blood-Venous  09-29-23   No growth at 5 days  --  --      .Blood Blood-Peripheral  09-29-23   No growth at 5 days  --  --      ET Tube ET Tube  09-29-23   Rare Yeast  --  --      ET Tube ET Tube  09-29-23   Numerous Proteus mirabilis  Moderate Pseudomonas aeruginosa (Carbapenem Resistant)  Normal Respiratory Cate absent  --  Proteus mirabilis  Pseudomo< from: Xray Chest 1 View- PORTABLE-Urgent (Xray Chest 1 View- PORTABLE-Urgent .) (10.19.23 @ 15:54) >  IMPRESSION:    Possible left infiltrate with small effusions.      < end of copied text >  < from: TTE W or WO Ultrasound Enhancing Agent (10.01.23 @ 10:07) >  CONCLUSIONS:      1. Left ventricular systolic function is normal with an ejection fraction visually estimated at 60 to 65 %.   2. The right ventricle is not well visualized.   3. Although there is no evidence of endocarditis on this study, the valves are not visualized well. Suggest STEPHANIE if clinically appropriate.    < end of copied text >  shima aeruginosa (Carbapenem Resistant)      Clean Catch Clean Catch (Midstream)  09-26-23   10,000 - 49,000 CFU/mL Candida albicans "Susceptibilities not performed"  --  --      .Blood Blood-Peripheral  09-26-23   No growth at 5 days  --  --      .Blood Blood-Venous  09-20-23   No growth at 5 days  --  --      .Blood Blood-Peripheral  09-20-23   No growth at 5 days  --  --          Rapid RVP Result: Jolynn (10-14 @ 05:10)        RADIOLOGY:  Images independently visualized and reviewed personally, findings as below

## 2023-10-20 NOTE — PROGRESS NOTE ADULT - SUBJECTIVE AND OBJECTIVE BOX
Subjective: Patient seen and examined. No new events except as noted.   Moved back to ICU   progressive hypotension post HD already on midodrine and droxidopa. GOC discussion reinitiated with family who again stated they want everything done for the patient. Pt was transferred to MICU for vasopressor support while on HD.     REVIEW OF SYSTEMS:  Unable to obtain       MEDICATIONS:  MEDICATIONS  (STANDING):  albuterol    0.083% 2.5 milliGRAM(s) Nebulizer every 6 hours  alteplase for catheter clearance 2 milliGRAM(s) Catheter once  alteplase for catheter clearance 2 milliGRAM(s) Catheter once  apixaban 5 milliGRAM(s) Oral every 12 hours  artificial tears (preservative free) Ophthalmic Solution 1 Drop(s) Both EYES every 4 hours  atorvastatin 10 milliGRAM(s) Oral at bedtime  aztreonam  IVPB 2000 milliGRAM(s) IV Intermittent <User Schedule>  chlorhexidine 0.12% Liquid 15 milliLiter(s) Oral Mucosa every 12 hours  chlorhexidine 2% Cloths 1 Application(s) Topical daily  dextrose 5%. 1000 milliLiter(s) (100 mL/Hr) IV Continuous <Continuous>  dextrose 5%. 1000 milliLiter(s) (50 mL/Hr) IV Continuous <Continuous>  dextrose 50% Injectable 25 Gram(s) IV Push once  dextrose 50% Injectable 12.5 Gram(s) IV Push once  dextrose 50% Injectable 25 Gram(s) IV Push once  dextrose 50% Injectable 25 Gram(s) IV Push once  droxidopa 100 milliGRAM(s) Oral three times a day  epoetin odalis (EPOGEN) Injectable 64494 Unit(s) IV Push <User Schedule>  ferrous    sulfate Liquid 300 milliGRAM(s) Oral daily  glucagon  Injectable 1 milliGRAM(s) IntraMuscular once  insulin lispro (ADMELOG) corrective regimen sliding scale   SubCutaneous every 6 hours  insulin NPH human recombinant 6 Unit(s) SubCutaneous every 6 hours  levETIRAcetam  Solution 1000 milliGRAM(s) Oral daily  levETIRAcetam  Solution 250 milliGRAM(s) Oral <User Schedule>  midodrine. 30 milliGRAM(s) Oral every 8 hours  pantoprazole  Injectable 40 milliGRAM(s) IV Push two times a day  petrolatum Ophthalmic Ointment 1 Application(s) Both EYES four times a day  phenylephrine    Infusion 0.1 MICROgram(s)/kG/Min (2.49 mL/Hr) IV Continuous <Continuous>  sodium chloride 1 Gram(s) Oral two times a day      PHYSICAL EXAM:  T(C): 37 (10-20-23 @ 08:00), Max: 37.7 (10-20-23 @ 04:00)  HR: 110 (10-20-23 @ 09:00) (100 - 126)  BP: 128/49 (10-20-23 @ 09:00) (62/40 - 134/45)  RR: 24 (10-20-23 @ 09:00) (19 - 24)  SpO2: 100% (10-20-23 @ 09:00) (91% - 118%)  Wt(kg): --  I&O's Summary    19 Oct 2023 07:01  -  20 Oct 2023 07:00  --------------------------------------------------------  IN: 425 mL / OUT: 900 mL / NET: -475 mL          Appearance: NAD, +trach  HEENT: dry oral mucosa  Lymphatic: No lymphadenopathy  Cardiovascular: Normal S1 S2, No JVD, No murmurs, No edema  Respiratory: Decreased BS, + trach to vent	  Neuro: opens eyes  Gastrointestinal: Soft, Non-tender, + BS, + NGT  Skin: No rashes, No ecchymoses, No cyanosis	  Extremities: No strength/ROM 2/2 sedation, + BL LE edema  Vascular: Peripheral pulses palpable 2+ bilaterally    Mode: AC/ CMV (Assist Control/ Continuous Mandatory Ventilation), RR (machine): 24, FiO2: 35, PEEP: 8, ITime: 0.8, MAP: 19, PC: 35, PIP: 44    LABS:    CARDIAC MARKERS:                                7.9    13.37 )-----------( 473      ( 20 Oct 2023 00:30 )             26.5     10-20    135  |  99  |  26<H>  ----------------------------<  126<H>  4.5   |  25  |  1.39<H>    Ca    8.6      20 Oct 2023 00:30  Phos  3.8     10-20  Mg     2.20     10-20    TPro  5.9<L>  /  Alb  2.3<L>  /  TBili  0.3  /  DBili  x   /  AST  17  /  ALT  11  /  AlkPhos  278<H>  10-20    proBNP:   Lipid Profile:   HgA1c:   TSH:             TELEMETRY: 	    ECG:  	  RADIOLOGY:   DIAGNOSTIC TESTING:  [ ] Echocardiogram:  [ ]  Catheterization:  [ ] Stress Test:    OTHER:

## 2023-10-21 NOTE — PROGRESS NOTE ADULT - ASSESSMENT
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       NEUROLOGY  #AMS  Multiple etiologies including active infection vs CVA.   - MR head performed w/ new infarct and old infarcts, EEG without seizure events but with high foci potential   - c/w ASA, lipitor  - c/w keppra      CARDIOVASCULAR  # Septic vs vasoplegic shock   Etiology likely septic iso active infection. Possible component of vasoplegic   - on midodrine and droxidopa  - on levo, capped at 1 pressor     # HFpEF w/ decompensation   > ECHO w/ EF 59 with severe LVH and diastolic dysfunction   - fluid overloaded, HD to remove fluids     HEENT   # Left ear OE with acute on chronic left-sided otomastoiditis  - ENT evaluation (9/7) with no mastoid tenderness, however found with positive swelling and excoriation to left auricle and tenderness to manipulation of auricle. Debrided crusting of auricle and EAC evaluated with edema and unable to visualize TM. EAC debrided and found with watery exudate.   - CT IAC with acute on chronic left-sided otitis externa and acute on chronic left-sided otomastoiditis. Superimposed left-sided tympanosclerosis. 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   - HSV negative and Path (9/6) with normal appearing epithelial cells singly and in clusters devoid of viral cytopathic effect.     RESPIRATORY  # AHRF second to volume overload   - CKD vs HFpEF w/ hypercarbia 2/2 volume overload requiring intubation, trach, and HD initiation  - Continue on albuterol and chest PT  - Continue volume removal with HD      #tracheomalacia   - CT CHEST (9/8) with tracheomalacia, BL pleural effusions and continued consolidations     #mechanical ventilation   - changed vent to PC 24/35/8/40 with slight improvement in PIP (40s)     GI  # UGIB   - CGE x 1 episode on 8/24 and melena x 2 episodes on 8/31 likely residual blood.   - EGD (8/31) found with esophagitis and erosive gastropathy  - Continue on PPI BID    # Dysphagia   - NGT-TF continued   - Pending PEG however needs improvement prior to placement    RENAL  # Progressive CKD   - Presented volume overload and progressive RUSS on CKD   - HD MWF TIW with midodrine and droxidopa  - ICU for vasopressor assisted volume removal, cap to 1 pressor and not offering CRRT due to comorbidities and prognosis       INFECTIOUS DISEASE  # ESBL ECOLI and MSSA VAP c/b MSSA bacteremia   - hx of MSSA bacteremia, ESBL E. Coli sputum Cx, BAL w/ MSSA  - ECHO unable to rule out endocarditis   - treated with several rounds of antibiotics including vancomycin (c/b rashes), meropenem, and daptomycin.     # Proteus and  PSA VAP/ Trachitis   - Continued fevers and SCx (9/29) with MDR  PSA and Proteus   - Continued on Bradford as above however noted with resistance and changed to Zosyn (10/2 - 10/5) with course complicated by possible drug rxn. Zosyn changed to Aztreonam (10/5 - 10/6) however RPT SCx (10/3) with  PSA however only intermediate coverage to aztreonam and amikacin.  PSA grossly resistant to other antibiotics other than cephalosporins however patient with reactions in the past. Case discussed with ID and ID pharmacist and change to Polymyxin (10/6 - 10/15) however continues to spike fevers likely second to Polymyxin only intermediate coverage and Recarbio resistant .   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  - HH remained labile requiring xfusion (10/15) however noted with questionable reaction with rash and BB called. Work up being sent.   - Monitor HH     VASCULAR   # LLE POP DVT   - US BL LE (9/10) with acute left deep vein thrombosis of the left popliteal vein.  - RUE swollen and VA DOPPLER RUE (10/3) with R IJ DVT and R brachial DVT.  - Eliquis held for permacath however procedure held.   - Eliquis remains on hold second to mild suction trauma hemoptysis.   - Resume Eliquis when able    # HD access  - TRISHA TAYLOR (9/27 - )     ONC   # Hx of Breast CA   - Patient dx in 2018 and s/p BL mastectomy (radical on right) and chemo and RT.   - Supportive care.     ENDOCRINE  # IDDM2   - Continued on NPH with ISS, however noted with hypoglycemic episodes and NPH dc'ed.    - Finger sticks trending up off NPH.   - Continue on NPH 6U with moderate ISS.   - Adjust as need     SKIN  # Drug Eruption   - Attempted cefepime (8/12) vs ancef (8/13-8/14) and then noted with drug rxn in ICU. Continue on steroids with prednisone 40 (8/18 - 9/2) then prednisone 30 (9/3-9/7), then prednisone 20 (9/8 - 9/10), then prednisone 10mg (9/11-9/13) then turn off.   - Attempted Zosyn (10/2-10/5) with possible rash. Zosyn turned off with improvement.   - Hold further cephalosporins or PCNs at this time   - Monitor for further drug rxns.     ETHICS/ GOC    - Attempted palliative discussion however family not interested and wishes for FULL CODE  - Family continues to want everything done despite HD failure on multiple oral pressors    DISPO - TBD

## 2023-10-21 NOTE — PROGRESS NOTE ADULT - SUBJECTIVE AND OBJECTIVE BOX
Progress Note    08-11-23 (71d)    Patient is a 73y old  Female who presents with a chief complaint of Respiratory distress (20 Oct 2023 11:34)      Subjective / Overnight Events :  - No acute events overnight.  - Pt seen and examined at bedside.     Additional ROS (if any):    MEDICATIONS  (STANDING):  albuterol    0.083% 2.5 milliGRAM(s) Nebulizer every 6 hours  alteplase for catheter clearance 2 milliGRAM(s) Catheter once  alteplase for catheter clearance 2 milliGRAM(s) Catheter once  apixaban 5 milliGRAM(s) Oral every 12 hours  artificial tears (preservative free) Ophthalmic Solution 1 Drop(s) Both EYES every 4 hours  atorvastatin 10 milliGRAM(s) Oral at bedtime  aztreonam  IVPB 2000 milliGRAM(s) IV Intermittent <User Schedule>  chlorhexidine 0.12% Liquid 15 milliLiter(s) Oral Mucosa every 12 hours  chlorhexidine 2% Cloths 1 Application(s) Topical daily  dextrose 5%. 1000 milliLiter(s) (50 mL/Hr) IV Continuous <Continuous>  dextrose 5%. 1000 milliLiter(s) (100 mL/Hr) IV Continuous <Continuous>  dextrose 50% Injectable 12.5 Gram(s) IV Push once  dextrose 50% Injectable 25 Gram(s) IV Push once  dextrose 50% Injectable 25 Gram(s) IV Push once  dextrose 50% Injectable 25 Gram(s) IV Push once  droxidopa 200 milliGRAM(s) Oral three times a day  epoetin odalis (EPOGEN) Injectable 00099 Unit(s) IV Push <User Schedule>  ferrous    sulfate Liquid 300 milliGRAM(s) Oral daily  glucagon  Injectable 1 milliGRAM(s) IntraMuscular once  insulin lispro (ADMELOG) corrective regimen sliding scale   SubCutaneous every 6 hours  insulin NPH human recombinant 6 Unit(s) SubCutaneous every 6 hours  levETIRAcetam  Solution 1000 milliGRAM(s) Oral daily  levETIRAcetam  Solution 250 milliGRAM(s) Oral <User Schedule>  midodrine. 30 milliGRAM(s) Oral every 8 hours  norepinephrine Infusion 0.05 MICROgram(s)/kG/Min (6.23 mL/Hr) IV Continuous <Continuous>  pantoprazole  Injectable 40 milliGRAM(s) IV Push two times a day  petrolatum Ophthalmic Ointment 1 Application(s) Both EYES four times a day  sodium chloride 1 Gram(s) Oral two times a day    MEDICATIONS  (PRN):  acetaminophen   Oral Liquid .. 650 milliGRAM(s) Oral every 6 hours PRN Temp greater or equal to 38C (100.4F), Mild Pain (1 - 3)  dextrose Oral Gel 15 Gram(s) Oral once PRN Blood Glucose LESS THAN 70 milliGRAM(s)/deciliter  diphenhydrAMINE Elixir 50 milliGRAM(s) Oral once PRN Rash and/or Itching  sodium chloride 0.9% Bolus. 250 milliLiter(s) IV Bolus every 5 minutes PRN SBP LESS THAN or EQUAL to 90 mmHg  sodium chloride 0.9% lock flush 10 milliLiter(s) IV Push every 1 hour PRN Pre/post blood products, medications, blood draw, and to maintain line patency          PHYSICAL EXAM:  Vital Signs Last 24 Hrs  T(C): 37.6 (21 Oct 2023 04:00), Max: 37.6 (21 Oct 2023 04:00)  T(F): 99.6 (21 Oct 2023 04:00), Max: 99.6 (21 Oct 2023 04:00)  HR: 117 (21 Oct 2023 07:00) (110 - 131)  BP: 125/17 (21 Oct 2023 07:00) (45/33 - 144/25)  BP(mean): 48 (21 Oct 2023 07:00) (30 - 117)  RR: 24 (21 Oct 2023 07:00) (14 - 25)  SpO2: 100% (21 Oct 2023 07:00) (92% - 100%)    Parameters below as of 21 Oct 2023 07:00  Patient On (Oxygen Delivery Method): ventilator    O2 Concentration (%): 35    I&O's Summary    20 Oct 2023 07:01  -  21 Oct 2023 07:00  --------------------------------------------------------  IN: 784 mL / OUT: 2400 mL / NET: -1616 mL        General: NAD, non-toxic appearing   HEENT: PERRLA, EOMi, no scleral icterus  CV: RRR, normal S1 and S2, no m/r/g  Lungs: normal respiratory effort. CTAB, no wheezes, rales, or rhonchi  Abd: soft, nontender, nondistended  Ext: no edema, 2+ peripheral pulses   Pysch: AAOx3, appropriate affect   Neuro: grossly non-focal, moving all extremities spontaneously   Skin: no rashes or lesions     LABS:  CAPILLARY BLOOD GLUCOSE      POCT Blood Glucose.: 97 mg/dL (21 Oct 2023 05:11)  POCT Blood Glucose.: 176 mg/dL (20 Oct 2023 23:40)  POCT Blood Glucose.: 135 mg/dL (20 Oct 2023 18:21)  POCT Blood Glucose.: 105 mg/dL (20 Oct 2023 11:23)                              8.4    20.41 )-----------( 584      ( 21 Oct 2023 00:15 )             28.2       WBC Trend: 20.41<--, 13.37<--, 15.27<--  Hb Trend: 8.4<--, 7.9<--, 7.8<--, 7.5<--, 9.0<--    10-21    134<L>  |  98  |  31<H>  ----------------------------<  163<H>  4.6   |  20<L>  |  1.62<H>    Ca    8.8      21 Oct 2023 00:15  Phos  4.5     10-21  Mg     2.20     10-21    TPro  6.1  /  Alb  2.2<L>  /  TBili  0.3  /  DBili  x   /  AST  10  /  ALT  13  /  AlkPhos  271<H>  10-21    PT/INR - ( 20 Oct 2023 00:30 )   PT: 17.9 sec;   INR: 1.61 ratio         PTT - ( 20 Oct 2023 00:30 )  PTT:32.2 sec      Urinalysis Basic - ( 21 Oct 2023 00:15 )    Color: x / Appearance: x / SG: x / pH: x  Gluc: 163 mg/dL / Ketone: x  / Bili: x / Urobili: x   Blood: x / Protein: x / Nitrite: x   Leuk Esterase: x / RBC: x / WBC x   Sq Epi: x / Non Sq Epi: x / Bacteria: x            RADIOLOGY & ADDITIONAL TESTS: Reviewed

## 2023-10-21 NOTE — PROGRESS NOTE ADULT - SUBJECTIVE AND OBJECTIVE BOX
Subjective: Patient seen and examined. No new events except as noted.   remains in ICU     REVIEW OF SYSTEMS:    Unable to obtain      MEDICATIONS:  MEDICATIONS  (STANDING):  albuterol    0.083% 2.5 milliGRAM(s) Nebulizer every 6 hours  alteplase for catheter clearance 2 milliGRAM(s) Catheter once  alteplase for catheter clearance 2 milliGRAM(s) Catheter once  artificial tears (preservative free) Ophthalmic Solution 1 Drop(s) Both EYES every 4 hours  atorvastatin 10 milliGRAM(s) Oral at bedtime  aztreonam  IVPB 2000 milliGRAM(s) IV Intermittent <User Schedule>  chlorhexidine 0.12% Liquid 15 milliLiter(s) Oral Mucosa every 12 hours  chlorhexidine 2% Cloths 1 Application(s) Topical daily  dextrose 5%. 1000 milliLiter(s) (100 mL/Hr) IV Continuous <Continuous>  dextrose 5%. 1000 milliLiter(s) (50 mL/Hr) IV Continuous <Continuous>  dextrose 50% Injectable 25 Gram(s) IV Push once  dextrose 50% Injectable 25 Gram(s) IV Push once  dextrose 50% Injectable 12.5 Gram(s) IV Push once  dextrose 50% Injectable 25 Gram(s) IV Push once  droxidopa 200 milliGRAM(s) Oral three times a day  epoetin odalis (EPOGEN) Injectable 13721 Unit(s) IV Push <User Schedule>  ferrous    sulfate Liquid 300 milliGRAM(s) Oral daily  glucagon  Injectable 1 milliGRAM(s) IntraMuscular once  insulin lispro (ADMELOG) corrective regimen sliding scale   SubCutaneous every 6 hours  insulin NPH human recombinant 6 Unit(s) SubCutaneous every 6 hours  levETIRAcetam  Solution 1000 milliGRAM(s) Oral daily  levETIRAcetam  Solution 250 milliGRAM(s) Oral <User Schedule>  midodrine. 30 milliGRAM(s) Oral every 8 hours  norepinephrine Infusion 0.05 MICROgram(s)/kG/Min (6.23 mL/Hr) IV Continuous <Continuous>  pantoprazole  Injectable 40 milliGRAM(s) IV Push two times a day  petrolatum Ophthalmic Ointment 1 Application(s) Both EYES four times a day  sodium chloride 1 Gram(s) Oral two times a day      PHYSICAL EXAM:  T(C): 37.5 (10-21-23 @ 16:00), Max: 37.6 (10-21-23 @ 04:00)  HR: 126 (10-21-23 @ 19:20) (114 - 135)  BP: 110/68 (10-21-23 @ 19:00) (60/47 - 144/25)  RR: 24 (10-21-23 @ 19:00) (14 - 25)  SpO2: 100% (10-21-23 @ 19:20) (93% - 100%)  Wt(kg): --  I&O's Summary    20 Oct 2023 07:01  -  21 Oct 2023 07:00  --------------------------------------------------------  IN: 784 mL / OUT: 2400 mL / NET: -1616 mL    21 Oct 2023 07:01  -  21 Oct 2023 20:12  --------------------------------------------------------  IN: 612.3 mL / OUT: 2100 mL / NET: -1487.7 mL          Appearance: NAD, +trach  HEENT: dry oral mucosa  Lymphatic: No lymphadenopathy  Cardiovascular: Normal S1 S2, No JVD, No murmurs, No edema  Respiratory: Decreased BS, + trach to vent	  Neuro: opens eyes  Gastrointestinal: Soft, Non-tender, + BS, + NGT  Skin: No rashes, No ecchymoses, No cyanosis	  Extremities: No strength/ROM 2/2 sedation, + BL LE edema  Vascular: Peripheral pulses palpable 2+ bilaterally    Mode: AC/ CMV (Assist Control/ Continuous Mandatory Ventilation), RR (machine): 24, FiO2: 35, PEEP: 8, ITime: 0.8, MAP: 19, PC: 35, PIP: 44      LABS:    CARDIAC MARKERS:                                8.4    20.41 )-----------( 584      ( 21 Oct 2023 00:15 )             28.2     10-21    134<L>  |  98  |  31<H>  ----------------------------<  163<H>  4.6   |  20<L>  |  1.62<H>    Ca    8.8      21 Oct 2023 00:15  Phos  4.5     10-21  Mg     2.20     10-21    TPro  6.1  /  Alb  2.2<L>  /  TBili  0.3  /  DBili  x   /  AST  10  /  ALT  13  /  AlkPhos  271<H>  10-21    proBNP:   Lipid Profile:   HgA1c:   TSH:             TELEMETRY: 	 SR   ECG:  	  RADIOLOGY:   DIAGNOSTIC TESTING:  [ ] Echocardiogram:  [ ]  Catheterization:  [ ] Stress Test:    OTHER:

## 2023-10-21 NOTE — PROGRESS NOTE ADULT - SUBJECTIVE AND OBJECTIVE BOX
Nephrology Prgress Nte    Patient is a 73y female in ICU on Vent, family at bedside.    Allergies:  isoniazid (Rash)  nafcillin (Unknown)  hydrALAZINE (Rash)  vitamin E (Short breath; Urticaria; Hives)  doxycycline (Rash)  cefepime (Rash)  NIFEdipine (Urticaria; Hives)  Zosyn (Rash)    Hospital Medications:   MEDICATIONS  (STANDING):  albuterol    0.083% 2.5 milliGRAM(s) Nebulizer every 6 hours  alteplase for catheter clearance 2 milliGRAM(s) Catheter once  alteplase for catheter clearance 2 milliGRAM(s) Catheter once  artificial tears (preservative free) Ophthalmic Solution 1 Drop(s) Both EYES every 4 hours  atorvastatin 10 milliGRAM(s) Oral at bedtime  aztreonam  IVPB 2000 milliGRAM(s) IV Intermittent <User Schedule>  chlorhexidine 0.12% Liquid 15 milliLiter(s) Oral Mucosa every 12 hours  chlorhexidine 2% Cloths 1 Application(s) Topical daily  dextrose 5%. 1000 milliLiter(s) (50 mL/Hr) IV Continuous <Continuous>  dextrose 5%. 1000 milliLiter(s) (100 mL/Hr) IV Continuous <Continuous>  dextrose 50% Injectable 12.5 Gram(s) IV Push once  dextrose 50% Injectable 25 Gram(s) IV Push once  dextrose 50% Injectable 25 Gram(s) IV Push once  dextrose 50% Injectable 25 Gram(s) IV Push once  droxidopa 200 milliGRAM(s) Oral three times a day  epoetin odalis (EPOGEN) Injectable 71825 Unit(s) IV Push <User Schedule>  ferrous    sulfate Liquid 300 milliGRAM(s) Oral daily  glucagon  Injectable 1 milliGRAM(s) IntraMuscular once  insulin lispro (ADMELOG) corrective regimen sliding scale   SubCutaneous every 6 hours  insulin NPH human recombinant 6 Unit(s) SubCutaneous every 6 hours  levETIRAcetam  Solution 1000 milliGRAM(s) Oral daily  levETIRAcetam  Solution 250 milliGRAM(s) Oral <User Schedule>  midodrine. 30 milliGRAM(s) Oral every 8 hours  norepinephrine Infusion 0.05 MICROgram(s)/kG/Min (6.23 mL/Hr) IV Continuous <Continuous>  pantoprazole  Injectable 40 milliGRAM(s) IV Push two times a day  petrolatum Ophthalmic Ointment 1 Application(s) Both EYES four times a day  sodium chloride 1 Gram(s) Oral two times a day      VITALS:  T(F): 97.7 (10-21-23 @ 13:25), Max: 99.6 (10-21-23 @ 04:00)  HR: 127 (10-21-23 @ 15:38)  BP: 112/60 (10-21-23 @ 15:38)  RR: 24 (10-21-23 @ 15:38)  SpO2: 100% (10-21-23 @ 15:38)      10-19 @ 07:01  -  10-20 @ 07:00  --------------------------------------------------------  IN: 425 mL / OUT: 900 mL / NET: -475 mL    10-20 @ 07:01  -  10-21 @ 07:00  --------------------------------------------------------  IN: 784 mL / OUT: 2400 mL / NET: -1616 mL    10-21 @ 07:01  -  10-21 @ 15:54  --------------------------------------------------------  IN: 501.1 mL / OUT: 2100 mL / NET: -1598.9 mL        PHYSICAL EXAM:  Constitutional: trach to vent   HEENT: + NGT + tracheostomy   Respiratory: coarse BS  Cardiovascular: S1 and S2  Gastrointestinal: BS+, soft, NT/ND  Extremities: +bilateral peripheral edema  Neurological: UTO  : No Wheeler  [facility]     LABS:  10-21    134<L>  |  98  |  31<H>  ----------------------------<  163<H>  4.6   |  20<L>  |  1.62<H>    Ca    8.8      21 Oct 2023 00:15  Phos  4.5     10-21  Mg     2.20     10-21    TPro  6.1  /  Alb  2.2<L>  /  TBili  0.3  /  DBili      /  AST  10  /  ALT  13  /  AlkPhos  271<H>  10-21                          8.4    20.41 )-----------( 584      ( 21 Oct 2023 00:15 )             28.2       Urine Studies:  Urinalysis Basic - ( 21 Oct 2023 00:15 )    Color:  / Appearance:  / SG:  / pH:   Gluc: 163 mg/dL / Ketone:   / Bili:  / Urobili:    Blood:  / Protein:  / Nitrite:    Leuk Esterase:  / RBC:  / WBC    Sq Epi:  / Non Sq Epi:  / Bacteria:         RADIOLOGY & ADDITIONAL STUDIES:     TTE:10/1/23  CONCLUSIONS:      1. Left ventricular systolic function is normal with an ejection fraction visually estimated at 60 to 65 %.   2. The right ventricle is not well visualized.   3. Although there is no evidence of endocarditis on this study, the valves are not visualized well. Suggest STEPHANIE if clinically appropriate.    ________________________________________________________________________________________  FINDINGS:     Left Ventricle:  Left ventricular systolic function is normal with an ejection fraction visually estimated at 60 to 65%. Left ventricular endocardium is not well visualized; however, the left ventricular systolic function appears grossly normal.     Right Ventricle:  The right ventricle is not well visualized.     Aortic Valve:  There iscalcification of the aortic valve leaflets.     Mitral Valve:  There is calcification of the mitral valve annulus. There is mild mitral regurgitation.     Tricuspid Valve:  Structurally normal tricuspid valve with normal leaflet excursion.     Pulmonic Valve:  Structurally normal pulmonic valve with normal leaflet excursion.      XR CHEST PORTABLE URGENT 1V   ORDERED BY: WILNER ZENG     PROCEDURE DATE:  10/19/2023          INTERPRETATION:  CLINICAL HISTORY: Desaturation.    COMPARISON: Chest radiograph dated 10/16/2023.    TECHNIQUE: Portable frontalchest radiograph. Adequate positioning.    FINDINGS:    Support devices: Enteric tube coursing below diaphragm with tip in   stomach. Endotracheal tube reaches the level of the medial clavicles.   Dialysis catheter reaches the upper superior vena cava. Loop recorder.    Cardiac/mediastinum/hilum: Cardiomediastinal silhouette cannot be   accurately assessed in this projection.    Lung parenchyma/Pleura: Right apical pleural thickening/tumor. Possible   left lower lobe infiltrate. Small pleural effusions. No pneumothorax.    Skeleton/soft tissues: Stable.      IMPRESSION:    Possible left infiltrate with small effusions.

## 2023-10-21 NOTE — PROGRESS NOTE ADULT - ASSESSMENT
IMPRESSION: 73F w/ HTN, DM2, CVA, breast CA-bilateral mastectomy, recurrent aspiration pneumonia/respiratory failure, and CKD, 8/11/23 p/w acute hypercapnic respiratory failure; c/b RUSS    (1)Renal - RUSS on CKD4 ==>now newly ESRD. s/p short HD Monday due to poor catheter flow s/p cathflo, if the catheter continues to function poorly, at some point we will have to plan to exchange it, PUF yesterday and HD today 1.7 L removed    (2)Hyponatremia - improved/stable    (3)CV- tenuous hemodynamics such with each passing week we have more difficulty performing HD/achieving UF, persistent issue.     (4)ID- BCx from 10/14/23 NGTD, BC (10/17) NGTD on IV abx     (5)Pulm- vent-dependent    (6)Anemia- hgb low, epo with HD    RECOMMEND:  (1)Next HD Monday or Tuesday,  Epogen with HD   (2 Avoid nephrotoxins as able   (3)Dose new meds for GFR <10/HD.         Jessica Cool, EMERITA  [facility]  [phone]

## 2023-10-21 NOTE — PROGRESS NOTE ADULT - ATTENDING COMMENTS
73F with prolonged hospital course with acute hypoxemic and hypercapnic respiratory failure s/p trach (9/1/2023) now back to MICU for hypotension during HD requiring vasopressor support.  HD today.  Will remove HD catheter for line holiday post HD.  Hold apixaban in preparation for eventual line replacement and start heparin gtt.   Apneic on PS trials.  Prognosis is poor.

## 2023-10-22 NOTE — PROGRESS NOTE ADULT - SUBJECTIVE AND OBJECTIVE BOX
INCOMPLETE    INTERVAL: NAEO  SUBJECTIVE: Patient examined bedside this AM.    OBJECTIVE:  ICU Vital Signs Last 24 Hrs  T(C): 38.2 (22 Oct 2023 04:00), Max: 38.2 (22 Oct 2023 04:00)  T(F): 100.7 (22 Oct 2023 04:00), Max: 100.7 (22 Oct 2023 04:00)  HR: 132 (22 Oct 2023 06:00) (114 - 135)  BP: 126/42 (22 Oct 2023 06:00) (60/47 - 142/61)  BP(mean): 65 (22 Oct 2023 06:00) (35 - 95)  ABP: --  ABP(mean): --  RR: 24 (22 Oct 2023 06:00) (21 - 25)  SpO2: 99% (22 Oct 2023 06:00) (93% - 100%)    O2 Parameters below as of 22 Oct 2023 06:00  Patient On (Oxygen Delivery Method): ventilator    O2 Concentration (%): 35      Mode: AC/ CMV (Assist Control/ Continuous Mandatory Ventilation), RR (machine): 24, FiO2: 35, PEEP: 8, ITime: 0.8, MAP: 9, PC: 35, PIP: 44    10-21 @ 07:01  -  10-22 @ 07:00  --------------------------------------------------------  IN: 956.3 mL / OUT: 2100 mL / NET: -1143.7 mL      CAPILLARY BLOOD GLUCOSE      POCT Blood Glucose.: 152 mg/dL (22 Oct 2023 06:07)      PHYSICAL EXAM:  General: Well-groomed, NAD, laying in bed, on RA  HEENT: PERRLA, EOMI, non-icteric  Neck:  symmetric,  JVD absent  Respiratory: Clear to ascultation bilaterally, no crackles/rales, no Resp distress; no accessory muscle use  Cardiovascular:  RRR, no murmurs/rubs/gallops  Abdomen: Soft, NT, ND  Extremities: No edema noted  Skin: No rashes or lesions noted  Neurological: Sensation grossly intact; strength 5/5 in all extremities.  Psychiatry: AOx3, appropriate insight/judgement, appropriate affect, recent/remote memory intact    PRN Meds:  acetaminophen   Oral Liquid .. 650 milliGRAM(s) Oral every 6 hours PRN  dextrose Oral Gel 15 Gram(s) Oral once PRN  diphenhydrAMINE Elixir 50 milliGRAM(s) Oral once PRN  sodium chloride 0.9% Bolus. 250 milliLiter(s) IV Bolus every 5 minutes PRN  sodium chloride 0.9% lock flush 10 milliLiter(s) IV Push every 1 hour PRN      LABS:                        8.3    16.35 )-----------( 560      ( 22 Oct 2023 00:05 )             28.0     Hgb Trend: 8.3<--, 8.4<--, 7.9<--, 7.8<--, 7.5<--  10-22    134<L>  |  99  |  22  ----------------------------<  174<H>  3.6   |  23  |  1.31<H>    Ca    8.6      22 Oct 2023 00:05  Phos  3.4     10-22  Mg     2.10     10-22    TPro  5.5<L>  /  Alb  2.2<L>  /  TBili  0.2  /  DBili  x   /  AST  13  /  ALT  8   /  AlkPhos  256<H>  10-22    Creatinine Trend: 1.31<--, 1.62<--, 1.39<--, 1.63<--, 1.73<--, 1.71<--  PT/INR - ( 22 Oct 2023 04:00 )   PT: 18.2 sec;   INR: 1.65 ratio         PTT - ( 22 Oct 2023 04:00 )  PTT:36.2 sec  Urinalysis Basic - ( 22 Oct 2023 00:05 )    Color: x / Appearance: x / SG: x / pH: x  Gluc: 174 mg/dL / Ketone: x  / Bili: x / Urobili: x   Blood: x / Protein: x / Nitrite: x   Leuk Esterase: x / RBC: x / WBC x   Sq Epi: x / Non Sq Epi: x / Bacteria: x        Venous Blood Gas:  10-22 @ 00:05  7.35/45/48/25/82.4  VBG Lactate: 1.2  Venous Blood Gas:  10-21 @ 00:15  7.31/46/52/23/85.0  VBG Lactate: 1.1      MICROBIOLOGY:       RADIOLOGY:  [ ] Reviewed and interpreted by me    EKG: INTERVAL: NAEO  SUBJECTIVE: Patient examined bedside this AM. Patient intubated, Unable to obtain ROS 2/2 mental status     OBJECTIVE:  ICU Vital Signs Last 24 Hrs  T(C): 38.2 (22 Oct 2023 04:00), Max: 38.2 (22 Oct 2023 04:00)  T(F): 100.7 (22 Oct 2023 04:00), Max: 100.7 (22 Oct 2023 04:00)  HR: 132 (22 Oct 2023 06:00) (114 - 135)  BP: 126/42 (22 Oct 2023 06:00) (60/47 - 142/61)  BP(mean): 65 (22 Oct 2023 06:00) (35 - 95)  ABP: --  ABP(mean): --  RR: 24 (22 Oct 2023 06:00) (21 - 25)  SpO2: 99% (22 Oct 2023 06:00) (93% - 100%)    O2 Parameters below as of 22 Oct 2023 06:00  Patient On (Oxygen Delivery Method): ventilator    O2 Concentration (%): 35      Mode: AC/ CMV (Assist Control/ Continuous Mandatory Ventilation), RR (machine): 24, FiO2: 35, PEEP: 8, ITime: 0.8, MAP: 9, PC: 35, PIP: 44    10-21 @ 07:01  -  10-22 @ 07:00  --------------------------------------------------------  IN: 956.3 mL / OUT: 2100 mL / NET: -1143.7 mL      CAPILLARY BLOOD GLUCOSE      POCT Blood Glucose.: 152 mg/dL (22 Oct 2023 06:07)      PHYSICAL EXAM:  General: Intubated, intermittently opening eyes; on trach ventilated 35% O2  HEENT: non-icteric  Neck:  symmetric,  JVD absent  Respiratory: Coarse breath sounds bilaterally; no Resp distress; no accessory muscle use  Cardiovascular:  RRR, no murmurs/rubs/gallops  Abdomen: Soft  Extremities: BL pitting edema  Skin: No rashes or lesions noted  Neurological: unable to assess   Psychiatry: unable to assess     PRN Meds:  acetaminophen   Oral Liquid .. 650 milliGRAM(s) Oral every 6 hours PRN  dextrose Oral Gel 15 Gram(s) Oral once PRN  diphenhydrAMINE Elixir 50 milliGRAM(s) Oral once PRN  sodium chloride 0.9% Bolus. 250 milliLiter(s) IV Bolus every 5 minutes PRN  sodium chloride 0.9% lock flush 10 milliLiter(s) IV Push every 1 hour PRN      LABS:                        8.3    16.35 )-----------( 560      ( 22 Oct 2023 00:05 )             28.0     Hgb Trend: 8.3<--, 8.4<--, 7.9<--, 7.8<--, 7.5<--  10-22    134<L>  |  99  |  22  ----------------------------<  174<H>  3.6   |  23  |  1.31<H>    Ca    8.6      22 Oct 2023 00:05  Phos  3.4     10-22  Mg     2.10     10-22    TPro  5.5<L>  /  Alb  2.2<L>  /  TBili  0.2  /  DBili  x   /  AST  13  /  ALT  8   /  AlkPhos  256<H>  10-22    Creatinine Trend: 1.31<--, 1.62<--, 1.39<--, 1.63<--, 1.73<--, 1.71<--  PT/INR - ( 22 Oct 2023 04:00 )   PT: 18.2 sec;   INR: 1.65 ratio         PTT - ( 22 Oct 2023 04:00 )  PTT:36.2 sec  Urinalysis Basic - ( 22 Oct 2023 00:05 )    Color: x / Appearance: x / SG: x / pH: x  Gluc: 174 mg/dL / Ketone: x  / Bili: x / Urobili: x   Blood: x / Protein: x / Nitrite: x   Leuk Esterase: x / RBC: x / WBC x   Sq Epi: x / Non Sq Epi: x / Bacteria: x        Venous Blood Gas:  10-22 @ 00:05  7.35/45/48/25/82.4  VBG Lactate: 1.2  Venous Blood Gas:  10-21 @ 00:15  7.31/46/52/23/85.0  VBG Lactate: 1.1      MICROBIOLOGY:       RADIOLOGY:  [ ] Reviewed and interpreted by me    EKG:

## 2023-10-22 NOTE — PROGRESS NOTE ADULT - NS ATTEST RISK PROBLEM GEN_ALL_CORE FT
hypotension
resp failure
respiratory failure, renal failure, pneumonia
respiratory failure, renal failure, pneumonia

## 2023-10-22 NOTE — PROGRESS NOTE ADULT - ASSESSMENT
IMPRESSION: 73F w/ HTN, DM2, CVA, breast CA-bilateral mastectomy, recurrent aspiration pneumonia/respiratory failure, and CKD, 8/11/23 p/w acute hypercapnic respiratory failure; c/b RUSS    (1)Renal - RUSS on CKD4 ==>now newly ESRD. s/p short HD Monday due to poor catheter flow s/p cathflo, if the catheter continues to function poorly, at some point we will have to plan to exchange it, PUF yesterday and HD 10/21 1.7 L removed    (2)Hyponatremia - improved/stable    (3)CV- tenuous hemodynamics such with each passing week we have more difficulty performing HD/achieving UF, persistent issue.     (4)ID- BCx from 10/14/23 NGTD, BC (10/17) NGTD on IV abx     (5)Pulm- trach/vent-dependent    (6)Anemia- hgb low, epo with HD    RECOMMEND:  (1)Next HD Monday or Tuesday,  Epogen with HD   (2 Avoid nephrotoxins as able   (3)Dose new meds for GFR <10/HD.         Jessica Cool NP  [facility]  [phone]

## 2023-10-22 NOTE — PROGRESS NOTE ADULT - SUBJECTIVE AND OBJECTIVE BOX
Nephrology Progress note    Patient is a 73y female in ICU , trach/Vent,     Allergies:  isoniazid (Rash)  nafcillin (Unknown)  hydrALAZINE (Rash)  vitamin E (Short breath; Urticaria; Hives)  doxycycline (Rash)  cefepime (Rash)  NIFEdipine (Urticaria; Hives)  Zosyn (Rash)    Hospital Medications:   MEDICATIONS  (STANDING):  albuterol    0.083% 2.5 milliGRAM(s) Nebulizer every 6 hours  alteplase for catheter clearance 2 milliGRAM(s) Catheter once  alteplase for catheter clearance 2 milliGRAM(s) Catheter once  artificial tears (preservative free) Ophthalmic Solution 1 Drop(s) Both EYES every 4 hours  atorvastatin 10 milliGRAM(s) Oral at bedtime  aztreonam  IVPB 2000 milliGRAM(s) IV Intermittent <User Schedule>  chlorhexidine 0.12% Liquid 15 milliLiter(s) Oral Mucosa every 12 hours  chlorhexidine 2% Cloths 1 Application(s) Topical daily  dextrose 5%. 1000 milliLiter(s) (50 mL/Hr) IV Continuous <Continuous>  dextrose 5%. 1000 milliLiter(s) (100 mL/Hr) IV Continuous <Continuous>  dextrose 50% Injectable 12.5 Gram(s) IV Push once  dextrose 50% Injectable 25 Gram(s) IV Push once  dextrose 50% Injectable 25 Gram(s) IV Push once  dextrose 50% Injectable 25 Gram(s) IV Push once  droxidopa 200 milliGRAM(s) Oral three times a day  epoetin odalis (EPOGEN) Injectable 89104 Unit(s) IV Push <User Schedule>  ferrous    sulfate Liquid 300 milliGRAM(s) Oral daily  glucagon  Injectable 1 milliGRAM(s) IntraMuscular once  heparin  Infusion.  Unit(s)/Hr (12 mL/Hr) IV Continuous <Continuous>  insulin lispro (ADMELOG) corrective regimen sliding scale   SubCutaneous every 6 hours  insulin NPH human recombinant 6 Unit(s) SubCutaneous every 6 hours  levETIRAcetam  Solution 1000 milliGRAM(s) Oral daily  levETIRAcetam  Solution 250 milliGRAM(s) Oral <User Schedule>  midodrine. 30 milliGRAM(s) Oral every 8 hours  norepinephrine Infusion 0.05 MICROgram(s)/kG/Min (6.23 mL/Hr) IV Continuous <Continuous>  pantoprazole  Injectable 40 milliGRAM(s) IV Push two times a day  petrolatum Ophthalmic Ointment 1 Application(s) Both EYES four times a day  sodium chloride 1 Gram(s) Oral two times a day        VITALS:  T(F): 98.9 (10-22-23 @ 12:00), Max: 100.7 (10-22-23 @ 04:00)  HR: 115 (10-22-23 @ 14:15)  BP: 136/41 (10-22-23 @ 14:15)  RR: 12 (10-22-23 @ 14:15)  SpO2: 100% (10-22-23 @ 14:15)    10-20 @ 07:01  -  10-21 @ 07:00  --------------------------------------------------------  IN: 784 mL / OUT: 2400 mL / NET: -1616 mL    10-21 @ 07:01  -  10-22 @ 07:00  --------------------------------------------------------  IN: 1008.3 mL / OUT: 2100 mL / NET: -1091.7 mL    10-22 @ 07:01  -  10-22 @ 15:02  --------------------------------------------------------  IN: 483.2 mL / OUT: 0 mL / NET: 483.2 mL        PHYSICAL EXAM:  Constitutional: trach to vent   HEENT: + NGT + tracheostomy   Respiratory: coarse BS  Cardiovascular: S1 and S2  Gastrointestinal: BS+, soft, NT/ND  Extremities: +bilateral peripheral edema  Neurological: UTO  : No Summer  Access: + [facility]     LABS:  10-22    134<L>  |  99  |  22  ----------------------------<  174<H>  3.6   |  23  |  1.31<H>    Ca    8.6      22 Oct 2023 00:05  Phos  3.4     10-22  Mg     2.10     10-22    TPro  5.5<L>  /  Alb  2.2<L>  /  TBili  0.2  /  DBili      /  AST  13  /  ALT  8   /  AlkPhos  256<H>  10-22                          8.5    15.73 )-----------( 515      ( 22 Oct 2023 13:00 )             27.0       Urine Studies:  Urinalysis Basic - ( 22 Oct 2023 00:05 )    Color:  / Appearance:  / SG:  / pH:   Gluc: 174 mg/dL / Ketone:   / Bili:  / Urobili:    Blood:  / Protein:  / Nitrite:    Leuk Esterase:  / RBC:  / WBC    Sq Epi:  / Non Sq Epi:  / Bacteria:

## 2023-10-22 NOTE — PROGRESS NOTE ADULT - ASSESSMENT
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       NEUROLOGY  #AMS  Multiple etiologies including active infection vs CVA.   - MR head performed w/ new infarct and old infarcts, EEG without seizure events but with high foci potential   - c/w ASA, lipitor  - c/w keppra      CARDIOVASCULAR  # Septic vs vasoplegic shock   Etiology likely septic iso active infection. Possible component of vasoplegic   - on midodrine and droxidopa  - on levo, capped at 1 pressor     # HFpEF w/ decompensation   > ECHO w/ EF 59 with severe LVH and diastolic dysfunction   - fluid overloaded, HD to remove fluids     HEENT   # Left ear OE with acute on chronic left-sided otomastoiditis  - ENT evaluation (9/7) with no mastoid tenderness, however found with positive swelling and excoriation to left auricle and tenderness to manipulation of auricle. Debrided crusting of auricle and EAC evaluated with edema and unable to visualize TM. EAC debrided and found with watery exudate.   - CT IAC with acute on chronic left-sided otitis externa and acute on chronic left-sided otomastoiditis. Superimposed left-sided tympanosclerosis. 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   - HSV negative and Path (9/6) with normal appearing epithelial cells singly and in clusters devoid of viral cytopathic effect.     RESPIRATORY  # AHRF second to volume overload   - CKD vs HFpEF w/ hypercarbia 2/2 volume overload requiring intubation, trach, and HD initiation  - Continue on albuterol and chest PT  - Continue volume removal with HD      #tracheomalacia   - CT CHEST (9/8) with tracheomalacia, BL pleural effusions and continued consolidations     #mechanical ventilation   - changed vent to PC 24/35/8/40 with slight improvement in PIP (40s)     GI  # UGIB   - CGE x 1 episode on 8/24 and melena x 2 episodes on 8/31 likely residual blood.   - EGD (8/31) found with esophagitis and erosive gastropathy  - Continue on PPI BID    # Dysphagia   - NGT-TF continued   - Pending PEG however needs improvement prior to placement    RENAL  # Progressive CKD   - Presented volume overload and progressive RUSS on CKD   - HD MWF TIW with midodrine and droxidopa  - ICU for vasopressor assisted volume removal, cap to 1 pressor and not offering CRRT due to comorbidities and prognosis       INFECTIOUS DISEASE  # ESBL ECOLI and MSSA VAP c/b MSSA bacteremia   - hx of MSSA bacteremia, ESBL E. Coli sputum Cx, BAL w/ MSSA  - ECHO unable to rule out endocarditis   - treated with several rounds of antibiotics including vancomycin (c/b rashes), meropenem, and daptomycin.     # Proteus and  PSA VAP/ Trachitis   - Continued fevers and SCx (9/29) with MDR  PSA and Proteus   - Continued on Bradford as above however noted with resistance and changed to Zosyn (10/2 - 10/5) with course complicated by possible drug rxn. Zosyn changed to Aztreonam (10/5 - 10/6) however RPT SCx (10/3) with  PSA however only intermediate coverage to aztreonam and amikacin.  PSA grossly resistant to other antibiotics other than cephalosporins however patient with reactions in the past. Case discussed with ID and ID pharmacist and change to Polymyxin (10/6 - 10/15) however continues to spike fevers likely second to Polymyxin only intermediate coverage and Recarbio resistant .   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  - HH remained labile requiring xfusion (10/15) however noted with questionable reaction with rash and BB called. Work up being sent.   - Monitor HH     VASCULAR   # LLE POP DVT   - US BL LE (9/10) with acute left deep vein thrombosis of the left popliteal vein.  - RUE swollen and VA DOPPLER RUE (10/3) with R IJ DVT and R brachial DVT.  - Eliquis held for permacath however procedure held.   - Eliquis remains on hold second to mild suction trauma hemoptysis.   - Resume Eliquis when able    # HD access  - TRISHA TAYLOR (9/27 - )     ONC   # Hx of Breast CA   - Patient dx in 2018 and s/p BL mastectomy (radical on right) and chemo and RT.   - Supportive care.     ENDOCRINE  # IDDM2   - Continued on NPH with ISS, however noted with hypoglycemic episodes and NPH dc'ed.    - Finger sticks trending up off NPH.   - Continue on NPH 6U with moderate ISS.   - Adjust as need     SKIN  # Drug Eruption   - Attempted cefepime (8/12) vs ancef (8/13-8/14) and then noted with drug rxn in ICU. Continue on steroids with prednisone 40 (8/18 - 9/2) then prednisone 30 (9/3-9/7), then prednisone 20 (9/8 - 9/10), then prednisone 10mg (9/11-9/13) then turn off.   - Attempted Zosyn (10/2-10/5) with possible rash. Zosyn turned off with improvement.   - Hold further cephalosporins or PCNs at this time   - Monitor for further drug rxns.     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       NEUROLOGY  #AMS  Multiple etiologies including active infection vs CVA.   - MR head performed w/ new infarct and old infarcts, EEG without seizure events but with high foci potential   - c/w ASA, lipitor  - c/w keppra      CARDIOVASCULAR  # Septic vs vasoplegic shock   Etiology likely septic iso active infection. Possible component of vasoplegic   - on midodrine and droxidopa  - on levo, capped at 1 pressor  -Will wean pressors with goal SBP >90    # HFpEF w/ decompensation   > ECHO w/ EF 59 with severe LVH and diastolic dysfunction   - fluid overloaded, HD to remove fluids     HEENT   # Left ear OE with acute on chronic left-sided otomastoiditis  - ENT evaluation (9/7) with no mastoid tenderness, however found with positive swelling and excoriation to left auricle and tenderness to manipulation of auricle. Debrided crusting of auricle and EAC evaluated with edema and unable to visualize TM. EAC debrided and found with watery exudate.   - CT IAC with acute on chronic left-sided otitis externa and acute on chronic left-sided otomastoiditis. Superimposed left-sided tympanosclerosis. 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   - HSV negative and Path (9/6) with normal appearing epithelial cells singly and in clusters devoid of viral cytopathic effect.     RESPIRATORY  # AHRF second to volume overload   - CKD vs HFpEF w/ hypercarbia 2/2 volume overload requiring intubation, trach, and HD initiation  - Continue on albuterol and chest PT  - Continue volume removal with HD      #tracheomalacia   - CT CHEST (9/8) with tracheomalacia, BL pleural effusions and continued consolidations     #mechanical ventilation   - changed vent to PC 24/35/8/40 with slight improvement in PIP (40s)     GI  # UGIB   - CGE x 1 episode on 8/24 and melena x 2 episodes on 8/31 likely residual blood.   - EGD (8/31) found with esophagitis and erosive gastropathy  - Continue on PPI BID    # Dysphagia   - NGT-TF continued   - Pending PEG however needs improvement prior to placement    RENAL  # Progressive CKD   - Presented volume overload and progressive RUSS on CKD   - HD MWF TIW with midodrine and droxidopa  - ICU for vasopressor assisted volume removal, cap to 1 pressor and not offering CRRT due to comorbidities and prognosis       INFECTIOUS DISEASE  # ESBL ECOLI and MSSA VAP c/b MSSA bacteremia   - hx of MSSA bacteremia, ESBL E. Coli sputum Cx, BAL w/ MSSA  - ECHO unable to rule out endocarditis   - treated with several rounds of antibiotics including vancomycin (c/b rashes), meropenem, and daptomycin.     # Proteus and  PSA VAP/ Trachitis   - Continued fevers and SCx (9/29) with MDR  PSA and Proteus   - Continued on Bradford as above however noted with resistance and changed to Zosyn (10/2 - 10/5) with course complicated by possible drug rxn. Zosyn changed to Aztreonam (10/5 - 10/6) however RPT SCx (10/3) with  PSA however only intermediate coverage to aztreonam and amikacin.  PSA grossly resistant to other antibiotics other than cephalosporins however patient with reactions in the past. Case discussed with ID and ID pharmacist and change to Polymyxin (10/6 - 10/15) however continues to spike fevers likely second to Polymyxin only intermediate coverage and Recarbio resistant .   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  - HH remained labile requiring xfusion (10/15) however noted with questionable reaction with rash and BB called. Work up being sent.   - Monitor HH     VASCULAR   # LLE POP DVT   - US BL LE (9/10) with acute left deep vein thrombosis of the left popliteal vein.  - RUE swollen and VA DOPPLER RUE (10/3) with R IJ DVT and R brachial DVT.  - Eliquis held for permacath however procedure held.   - Eliquis remains on hold second to mild suction trauma hemoptysis.   - Resume Eliquis when able    # HD access  - TRISHA TAYLOR (9/27 - )     ONC   # Hx of Breast CA   - Patient dx in 2018 and s/p BL mastectomy (radical on right) and chemo and RT.   - Supportive care.     ENDOCRINE  # IDDM2   - Continued on NPH with ISS, however noted with hypoglycemic episodes and NPH dc'ed.    - Finger sticks trending up off NPH.   - Continue on NPH 6U with moderate ISS.   - Adjust as need     SKIN  # Drug Eruption   - Attempted cefepime (8/12) vs ancef (8/13-8/14) and then noted with drug rxn in ICU. Continue on steroids with prednisone 40 (8/18 - 9/2) then prednisone 30 (9/3-9/7), then prednisone 20 (9/8 - 9/10), then prednisone 10mg (9/11-9/13) then turn off.   - Attempted Zosyn (10/2-10/5) with possible rash. Zosyn turned off with improvement.   - Hold further cephalosporins or PCNs at this time   - Monitor for further drug rxns.     ETHICS/ GOC    - Attempted palliative discussion however family not interested and wishes for FULL CODE  - Family continues to want everything done despite HD failure on multiple oral pressors    DISPO - TBD

## 2023-10-22 NOTE — PROGRESS NOTE ADULT - SUBJECTIVE AND OBJECTIVE BOX
Subjective: Patient seen and examined. No new events except as noted.   remains in ICU     REVIEW OF SYSTEMS:  Unable to obtain     MEDICATIONS:  MEDICATIONS  (STANDING):  albuterol    0.083% 2.5 milliGRAM(s) Nebulizer every 6 hours  alteplase for catheter clearance 2 milliGRAM(s) Catheter once  alteplase for catheter clearance 2 milliGRAM(s) Catheter once  artificial tears (preservative free) Ophthalmic Solution 1 Drop(s) Both EYES every 4 hours  atorvastatin 10 milliGRAM(s) Oral at bedtime  aztreonam  IVPB 2000 milliGRAM(s) IV Intermittent <User Schedule>  chlorhexidine 0.12% Liquid 15 milliLiter(s) Oral Mucosa every 12 hours  chlorhexidine 2% Cloths 1 Application(s) Topical daily  dextrose 5%. 1000 milliLiter(s) (100 mL/Hr) IV Continuous <Continuous>  dextrose 5%. 1000 milliLiter(s) (50 mL/Hr) IV Continuous <Continuous>  dextrose 50% Injectable 25 Gram(s) IV Push once  dextrose 50% Injectable 25 Gram(s) IV Push once  dextrose 50% Injectable 12.5 Gram(s) IV Push once  dextrose 50% Injectable 25 Gram(s) IV Push once  droxidopa 200 milliGRAM(s) Oral three times a day  epoetin odalis (EPOGEN) Injectable 13552 Unit(s) IV Push <User Schedule>  ferrous    sulfate Liquid 300 milliGRAM(s) Oral daily  glucagon  Injectable 1 milliGRAM(s) IntraMuscular once  heparin  Infusion.  Unit(s)/Hr (12 mL/Hr) IV Continuous <Continuous>  insulin lispro (ADMELOG) corrective regimen sliding scale   SubCutaneous every 6 hours  insulin NPH human recombinant 6 Unit(s) SubCutaneous every 6 hours  levETIRAcetam  Solution 1000 milliGRAM(s) Oral daily  levETIRAcetam  Solution 250 milliGRAM(s) Oral <User Schedule>  midodrine. 30 milliGRAM(s) Oral every 8 hours  norepinephrine Infusion 0.05 MICROgram(s)/kG/Min (6.23 mL/Hr) IV Continuous <Continuous>  pantoprazole  Injectable 40 milliGRAM(s) IV Push two times a day  petrolatum Ophthalmic Ointment 1 Application(s) Both EYES four times a day  sodium chloride 1 Gram(s) Oral two times a day      PHYSICAL EXAM:  T(C): 37.4 (10-22-23 @ 08:00), Max: 38.2 (10-22-23 @ 04:00)  HR: 121 (10-22-23 @ 08:00) (114 - 135)  BP: 116/36 (10-22-23 @ 08:00) (60/47 - 142/61)  RR: 24 (10-22-23 @ 08:00) (21 - 25)  SpO2: 100% (10-22-23 @ 08:00) (93% - 100%)  Wt(kg): --  I&O's Summary    21 Oct 2023 07:01  -  22 Oct 2023 07:00  --------------------------------------------------------  IN: 1008.3 mL / OUT: 2100 mL / NET: -1091.7 mL    22 Oct 2023 07:01  -  22 Oct 2023 09:07  --------------------------------------------------------  IN: 52 mL / OUT: 0 mL / NET: 52 mL          Appearance: NAD, +trach  HEENT: dry oral mucosa  Lymphatic: No lymphadenopathy  Cardiovascular: Normal S1 S2, No JVD, No murmurs, No edema  Respiratory: Decreased BS, + trach to vent	  Neuro: opens eyes  Gastrointestinal: Soft, Non-tender, + BS, + NGT  Skin: No rashes, No ecchymoses, No cyanosis	  Extremities: No strength/ROM 2/2 sedation, + BL LE edema  Vascular: Peripheral pulses palpable 2+ bilaterally    Mode: AC/ CMV (Assist Control/ Continuous Mandatory Ventilation), RR (machine): 24, FiO2: 35, PEEP: 8, ITime: 0.8, MAP: 19, PC: 35, PIP: 44            LABS:    CARDIAC MARKERS:                                8.3    16.35 )-----------( 560      ( 22 Oct 2023 00:05 )             28.0     10-22    134<L>  |  99  |  22  ----------------------------<  174<H>  3.6   |  23  |  1.31<H>    Ca    8.6      22 Oct 2023 00:05  Phos  3.4     10-22  Mg     2.10     10-22    TPro  5.5<L>  /  Alb  2.2<L>  /  TBili  0.2  /  DBili  x   /  AST  13  /  ALT  8   /  AlkPhos  256<H>  10-22    proBNP:   Lipid Profile:   HgA1c:   TSH:             TELEMETRY: 	    ECG:  	  RADIOLOGY:   DIAGNOSTIC TESTING:  [ ] Echocardiogram:  [ ]  Catheterization:  [ ] Stress Test:    OTHER:

## 2023-10-22 NOTE — PROGRESS NOTE ADULT - ATTENDING COMMENTS
73F with prolonged hospital course with acute hypoxemic and hypercapnic respiratory failure s/p trach (9/1/2023) now back to MICU for hypotension during HD requiring vasopressor support.  Leukocytosis improving post HD catheter improved.  Hold apixaban in preparation for eventual line replacement and start heparin gtt.   Remains on vasopressor support, wean as tolerate.   Apneic on PS trials.  Prognosis remains guarded.

## 2023-10-23 NOTE — PROGRESS NOTE ADULT - SUBJECTIVE AND OBJECTIVE BOX
Subjective: Patient seen and examined. No new events except as noted.   remains in ICU   family at bedside       REVIEW OF SYSTEMS:  Unable to obtain     MEDICATIONS:  MEDICATIONS  (STANDING):  albuterol    0.083% 2.5 milliGRAM(s) Nebulizer every 6 hours  alteplase for catheter clearance 2 milliGRAM(s) Catheter once  alteplase for catheter clearance 2 milliGRAM(s) Catheter once  artificial tears (preservative free) Ophthalmic Solution 1 Drop(s) Both EYES every 4 hours  atorvastatin 10 milliGRAM(s) Oral at bedtime  aztreonam  IVPB 2000 milliGRAM(s) IV Intermittent <User Schedule>  chlorhexidine 0.12% Liquid 15 milliLiter(s) Oral Mucosa every 12 hours  chlorhexidine 2% Cloths 1 Application(s) Topical daily  dextrose 5%. 1000 milliLiter(s) (50 mL/Hr) IV Continuous <Continuous>  dextrose 5%. 1000 milliLiter(s) (100 mL/Hr) IV Continuous <Continuous>  dextrose 50% Injectable 12.5 Gram(s) IV Push once  dextrose 50% Injectable 25 Gram(s) IV Push once  dextrose 50% Injectable 25 Gram(s) IV Push once  dextrose 50% Injectable 25 Gram(s) IV Push once  droxidopa 200 milliGRAM(s) Oral every 8 hours  epoetin odalis (EPOGEN) Injectable 40516 Unit(s) IV Push <User Schedule>  ferrous    sulfate Liquid 300 milliGRAM(s) Oral daily  glucagon  Injectable 1 milliGRAM(s) IntraMuscular once  heparin  Infusion. 1000 Unit(s)/Hr (10 mL/Hr) IV Continuous <Continuous>  insulin lispro (ADMELOG) corrective regimen sliding scale   SubCutaneous every 6 hours  insulin NPH human recombinant 6 Unit(s) SubCutaneous every 6 hours  levETIRAcetam  Solution 1000 milliGRAM(s) Oral daily  levETIRAcetam  Solution 250 milliGRAM(s) Oral <User Schedule>  midodrine. 30 milliGRAM(s) Oral every 8 hours  norepinephrine Infusion 0.05 MICROgram(s)/kG/Min (6.23 mL/Hr) IV Continuous <Continuous>  pantoprazole  Injectable 40 milliGRAM(s) IV Push two times a day  petrolatum Ophthalmic Ointment 1 Application(s) Both EYES four times a day  sodium chloride 1 Gram(s) Oral two times a day      PHYSICAL EXAM:  T(C): 37 (10-23-23 @ 08:00), Max: 37.7 (10-23-23 @ 04:00)  HR: 116 (10-23-23 @ 10:04) (105 - 126)  BP: 126/52 (10-23-23 @ 10:00) (94/49 - 137/46)  RR: 24 (10-23-23 @ 10:00) (12 - 24)  SpO2: 100% (10-23-23 @ 10:04) (96% - 100%)  Wt(kg): --  I&O's Summary    22 Oct 2023 07:01  -  23 Oct 2023 07:00  --------------------------------------------------------  IN: 1035.2 mL / OUT: 0 mL / NET: 1035.2 mL    23 Oct 2023 07:01  -  23 Oct 2023 10:39  --------------------------------------------------------  IN: 150 mL / OUT: 0 mL / NET: 150 mL        Appearance: NAD, +trach  HEENT: dry oral mucosa  Lymphatic: No lymphadenopathy  Cardiovascular: Normal S1 S2, No JVD, No murmurs, No edema  Respiratory: Decreased BS, + trach to vent	  Neuro: opens eyes  Gastrointestinal: Soft, Non-tender, + BS, + NGT  Skin: No rashes, No ecchymoses, No cyanosis	  Extremities: No strength/ROM 2/2 sedation, + BL LE edema  Vascular: Peripheral pulses palpable 2+ bilaterally  Anasarca   Mode: AC/ CMV (Assist Control/ Continuous Mandatory Ventilation), RR (machine): 24, FiO2: 35, PEEP: 8, ITime: 0.8, MAP: 19, PC: 35, PIP: 44          LABS:    CARDIAC MARKERS:                                8.1    15.11 )-----------( 450      ( 23 Oct 2023 02:30 )             26.3     10-23    138  |  102  |  27<H>  ----------------------------<  154<H>  4.1   |  26  |  1.59<H>    Ca    8.5      23 Oct 2023 02:30  Phos  3.8     10-23  Mg     2.10     10-23    TPro  5.5<L>  /  Alb  1.9<L>  /  TBili  <0.2  /  DBili  x   /  AST  23  /  ALT  10  /  AlkPhos  240<H>  10-23      TELEMETRY: 	SRF    ECG:  	  RADIOLOGY:   DIAGNOSTIC TESTING:  [ ] Echocardiogram:  [ ]  Catheterization:  [ ] Stress Test:    OTHER:

## 2023-10-23 NOTE — PROGRESS NOTE ADULT - PROBLEM SELECTOR PLAN 1
Multifactorial     - Aspiration, acute on chronic diastolic CHF   s/p trach 9/1  now with likely sepsis   On Abx  Prognosis very poor. Discussed with  an daughter at length at bedside

## 2023-10-23 NOTE — PROGRESS NOTE ADULT - SUBJECTIVE AND OBJECTIVE BOX
[facility] [phone]        Patient seen and examined trach to [facility]. She is now off vasopressor support. She had HD Saturday 1.7L removed, ilene d/c after HD.         MEDICATIONS  (STANDING):  albuterol    0.083% 2.5 milliGRAM(s) Nebulizer every 6 hours  alteplase for catheter clearance 2 milliGRAM(s) Catheter once  alteplase for catheter clearance 2 milliGRAM(s) Catheter once  artificial tears (preservative free) Ophthalmic Solution 1 Drop(s) Both EYES every 4 hours  atorvastatin 10 milliGRAM(s) Oral at bedtime  aztreonam  IVPB 2000 milliGRAM(s) IV Intermittent <User Schedule>  chlorhexidine 0.12% Liquid 15 milliLiter(s) Oral Mucosa every 12 hours  chlorhexidine 2% Cloths 1 Application(s) Topical daily  dextrose 5%. 1000 milliLiter(s) (100 mL/Hr) IV Continuous <Continuous>  dextrose 5%. 1000 milliLiter(s) (50 mL/Hr) IV Continuous <Continuous>  dextrose 50% Injectable 25 Gram(s) IV Push once  dextrose 50% Injectable 25 Gram(s) IV Push once  dextrose 50% Injectable 25 Gram(s) IV Push once  dextrose 50% Injectable 12.5 Gram(s) IV Push once  droxidopa 200 milliGRAM(s) Oral every 8 hours  droxidopa 200 milliGRAM(s) Oral <User Schedule>  epoetin odalis (EPOGEN) Injectable 34499 Unit(s) IV Push <User Schedule>  ferrous    sulfate Liquid 300 milliGRAM(s) Oral daily  glucagon  Injectable 1 milliGRAM(s) IntraMuscular once  heparin  Infusion. 1000 Unit(s)/Hr (10 mL/Hr) IV Continuous <Continuous>  insulin lispro (ADMELOG) corrective regimen sliding scale   SubCutaneous every 6 hours  insulin NPH human recombinant 6 Unit(s) SubCutaneous every 6 hours  levETIRAcetam  Solution 1000 milliGRAM(s) Oral daily  levETIRAcetam  Solution 250 milliGRAM(s) Oral <User Schedule>  midodrine. 30 milliGRAM(s) Oral every 8 hours  norepinephrine Infusion 0.05 MICROgram(s)/kG/Min (6.23 mL/Hr) IV Continuous <Continuous>  pantoprazole  Injectable 40 milliGRAM(s) IV Push two times a day  petrolatum Ophthalmic Ointment 1 Application(s) Both EYES four times a day  sodium chloride 1 Gram(s) Oral two times a day      VITAL:  T(C): , Max: 37.7 (10-23-23 @ 04:00)  T(F): , Max: 99.8 (10-23-23 @ 04:00)  HR: 113 (10-23-23 @ 11:50)  BP: 126/52 (10-23-23 @ 10:00)  BP(mean): 62 (10-23-23 @ 10:00)  RR: 24 (10-23-23 @ 10:00)  SpO2: 100% (10-23-23 @ 11:50)  Wt(kg): --    I and O's:    10-22 @ 07:01  -  10-23 @ 07:00  --------------------------------------------------------  IN: 1035.2 mL / OUT: 0 mL / NET: 1035.2 mL    10-23 @ 07:01  -  10-23 @ 13:16  --------------------------------------------------------  IN: 150 mL / OUT: 0 mL / NET: 150 mL          PHYSICAL EXAM:    Constitutional: trach to vent  HEENT: + tracheostomy, + NGT  Respiratory: Coarse BS  Cardiovascular: S1 and S2  Gastrointestinal: BS+, soft, NT/ND  Extremities: ++ peripheral edema  Neurological: UTO  : No Wheeler  Skin: No rashes  Access: Not applicable    LABS:                        8.1    15.11 )-----------( 450      ( 23 Oct 2023 02:30 )             26.3     10-23    138  |  102  |  27<H>  ----------------------------<  154<H>  4.1   |  26  |  1.59<H>    Ca    8.5      23 Oct 2023 02:30  Phos  3.8     10-23  Mg     2.10     10-23    TPro  5.5<L>  /  Alb  1.9<L>  /  TBili  <0.2  /  DBili  x   /  AST  23  /  ALT  10  /  AlkPhos  240<H>  10-23          Urine Studies:  Urinalysis Basic - ( 23 Oct 2023 02:30 )    Color: x / Appearance: x / SG: x / pH: x  Gluc: 154 mg/dL / Ketone: x  / Bili: x / Urobili: x   Blood: x / Protein: x / Nitrite: x   Leuk Esterase: x / RBC: x / WBC x   Sq Epi: x / Non Sq Epi: x / Bacteria: x        IMPRESSION: 73F w/ HTN, DM2, CVA, breast CA-bilateral mastectomy, recurrent aspiration pneumonia/respiratory failure, and CKD, 8/11/23 p/w acute hypercapnic respiratory failure; c/b RUSS    (1)Renal - RUSS on CKD4 ==>now newly ESRD. Last dialyzed Saturday, HD tomorrow     (2)Hyponatremia - improved/stable    (3)CV- tenuous hemodynamics such with each passing week we have more difficulty performing HD/achieving UF, persistent issue.     (4)ID- BCx from 10/14/23 NGTD, BC (10/17) NGTD on IV abx     (5)Pulm- trach/vent-dependent    (6)Anemia- hgb low, epo with HD    RECOMMEND:  (1)HD tomorrow, Epogen with HD   (2)Shiley to be placed   (3)Dose new meds for GFR <10/HD.         Tere Arauz NP  [facility]  [phone]          [facility] [phone]        Patient seen and examined trach to [facility]. She is now off vasopressor support. She had HD Saturday 1.7L removed, ilene d/c after HD.         MEDICATIONS  (STANDING):  albuterol    0.083% 2.5 milliGRAM(s) Nebulizer every 6 hours  alteplase for catheter clearance 2 milliGRAM(s) Catheter once  alteplase for catheter clearance 2 milliGRAM(s) Catheter once  artificial tears (preservative free) Ophthalmic Solution 1 Drop(s) Both EYES every 4 hours  atorvastatin 10 milliGRAM(s) Oral at bedtime  aztreonam  IVPB 2000 milliGRAM(s) IV Intermittent <User Schedule>  chlorhexidine 0.12% Liquid 15 milliLiter(s) Oral Mucosa every 12 hours  chlorhexidine 2% Cloths 1 Application(s) Topical daily  dextrose 5%. 1000 milliLiter(s) (100 mL/Hr) IV Continuous <Continuous>  dextrose 5%. 1000 milliLiter(s) (50 mL/Hr) IV Continuous <Continuous>  dextrose 50% Injectable 25 Gram(s) IV Push once  dextrose 50% Injectable 25 Gram(s) IV Push once  dextrose 50% Injectable 25 Gram(s) IV Push once  dextrose 50% Injectable 12.5 Gram(s) IV Push once  droxidopa 200 milliGRAM(s) Oral every 8 hours  droxidopa 200 milliGRAM(s) Oral <User Schedule>  epoetin odalis (EPOGEN) Injectable 61144 Unit(s) IV Push <User Schedule>  ferrous    sulfate Liquid 300 milliGRAM(s) Oral daily  glucagon  Injectable 1 milliGRAM(s) IntraMuscular once  heparin  Infusion. 1000 Unit(s)/Hr (10 mL/Hr) IV Continuous <Continuous>  insulin lispro (ADMELOG) corrective regimen sliding scale   SubCutaneous every 6 hours  insulin NPH human recombinant 6 Unit(s) SubCutaneous every 6 hours  levETIRAcetam  Solution 1000 milliGRAM(s) Oral daily  levETIRAcetam  Solution 250 milliGRAM(s) Oral <User Schedule>  midodrine. 30 milliGRAM(s) Oral every 8 hours  norepinephrine Infusion 0.05 MICROgram(s)/kG/Min (6.23 mL/Hr) IV Continuous <Continuous>  pantoprazole  Injectable 40 milliGRAM(s) IV Push two times a day  petrolatum Ophthalmic Ointment 1 Application(s) Both EYES four times a day  sodium chloride 1 Gram(s) Oral two times a day      VITAL:  T(C): , Max: 37.7 (10-23-23 @ 04:00)  T(F): , Max: 99.8 (10-23-23 @ 04:00)  HR: 113 (10-23-23 @ 11:50)  BP: 126/52 (10-23-23 @ 10:00)  BP(mean): 62 (10-23-23 @ 10:00)  RR: 24 (10-23-23 @ 10:00)  SpO2: 100% (10-23-23 @ 11:50)  Wt(kg): --    I and O's:    10-22 @ 07:01  -  10-23 @ 07:00  --------------------------------------------------------  IN: 1035.2 mL / OUT: 0 mL / NET: 1035.2 mL    10-23 @ 07:01  -  10-23 @ 13:16  --------------------------------------------------------  IN: 150 mL / OUT: 0 mL / NET: 150 mL          PHYSICAL EXAM:    Constitutional: trach to vent  HEENT: + tracheostomy, + NGT  Respiratory: Coarse BS  Cardiovascular: S1 and S2  Gastrointestinal: BS+, soft, NT/ND  Extremities: ++ peripheral edema  Neurological: UTO  : No Wheeler  Skin: No rashes  Access: Not applicable    LABS:                        8.1    15.11 )-----------( 450      ( 23 Oct 2023 02:30 )             26.3     10-23    138  |  102  |  27<H>  ----------------------------<  154<H>  4.1   |  26  |  1.59<H>    Ca    8.5      23 Oct 2023 02:30  Phos  3.8     10-23  Mg     2.10     10-23    TPro  5.5<L>  /  Alb  1.9<L>  /  TBili  <0.2  /  DBili  x   /  AST  23  /  ALT  10  /  AlkPhos  240<H>  10-23          Urine Studies:  Urinalysis Basic - ( 23 Oct 2023 02:30 )    Color: x / Appearance: x / SG: x / pH: x  Gluc: 154 mg/dL / Ketone: x  / Bili: x / Urobili: x   Blood: x / Protein: x / Nitrite: x   Leuk Esterase: x / RBC: x / WBC x   Sq Epi: x / Non Sq Epi: x / Bacteria: x        IMPRESSION: 73F w/ HTN, DM2, CVA, breast CA-bilateral mastectomy, recurrent aspiration pneumonia/respiratory failure, and CKD, 8/11/23 p/w acute hypercapnic respiratory failure; c/b RUSS    (1)Renal - RUSS on CKD4 ==>now newly ESRD. Last dialyzed Saturday, HD tomorrow     (2)Hyponatremia - improved/stable    (3)CV- tenuous hemodynamics such with each passing week we have more difficulty performing HD/achieving UF, persistent issue.     (4)ID- BCx from 10/14/23 NGTD, BC (10/17) NGTD on IV abx     (5)Pulm- trach/vent-dependent    (6)Anemia- hgb low, epo with HD    RECOMMEND:  (1)HD tomorrow, Epogen with HD   (2)Shiley to be placed   (3)Dose new meds for GFR <10/HD.         Tere Arauz NP  [facility]  [phone]       RENAL ATTENDING NOTE  Patient seen and examined with NP. Agree with assessment and plan as above.  D/w'd Pulm/Critical Care team as well    Jimmy Colon MD  [facility]  [phone]

## 2023-10-23 NOTE — PROGRESS NOTE ADULT - ASSESSMENT
73 f with DM, HTN, CKD, breast CA, latent TB (treated first with INH then had rash and switched to rifampin), MSSA bacteremia, c-diff, respiratory arrest and cardiac arrest (2018), s/p trach/PEG then removed, CVA with residual weakness, aspiration PNA, 1/4 sputums had MAC 7/2023, admitted 8/11 with respiratory failure no fever or WBC but was intubated and then spiked  blood cx negative, sputum cx with MSSA  developed rash and renal failure after cefepime, was on HD then discontinued became uremic and now again on HD  head MRI 8/17 with acute cerebellar infarct  had renal failure, AIN? family declined renal biopsy started on prednisone  s/p trach 9/1 and BAL with MSSA   ET cx with ESBL and MSSA  blood cx 9/1: MSSA with hypotension  repeat negative 9/4, 9/8, TTE no veg s/p 4 weeks of vanco/dapto through 10/2  L ear edema and otitis externa, s/p a long course of ana cristina, the last cx showed candida and ENT stated it could be fungal (saw black spores?) so was also given 7 days of fluconazole  pt had a rash again, unclear what caused it, fluconazole  still with intermittent fevers and then sputum cx with carbapenem resistant pseudomonas, s/p a course of polymixin  no improvement in mental status and ?seizure as well, neuro following  had drop in Hgb s/p transfusion and then had again febrile 10/15-10.17 with leukocytosis, rash and eosinophilia, sputum growing pseudomonas and proteus  (both sensitive to aztreonam) ?VAP vs a transfusion reaction  hypotension during HD,  transferred to MICU, now off pressors, shiley was removed in view of intermittent fever, now plan for new shiley, c-diff and GI PCR negative     * aztreonam renally dosed, will likely complete a 10 day course, started 10/17, now day 7  * monitor CBC/diff and CMP        The above assessment and plan was discussed with MICU    Yumiko Conner MD  contact on teams  After 5pm and on weekends call [phone]

## 2023-10-23 NOTE — PROGRESS NOTE ADULT - SUBJECTIVE AND OBJECTIVE BOX
S:  Pt seen and examined. Still requiring pressors for HD      Medications: MEDICATIONS  (STANDING):  albuterol    0.083% 2.5 milliGRAM(s) Nebulizer every 6 hours  alteplase for catheter clearance 2 milliGRAM(s) Catheter once  alteplase for catheter clearance 2 milliGRAM(s) Catheter once  artificial tears (preservative free) Ophthalmic Solution 1 Drop(s) Both EYES every 4 hours  atorvastatin 10 milliGRAM(s) Oral at bedtime  aztreonam  IVPB 2000 milliGRAM(s) IV Intermittent <User Schedule>  chlorhexidine 0.12% Liquid 15 milliLiter(s) Oral Mucosa every 12 hours  chlorhexidine 2% Cloths 1 Application(s) Topical daily  dextrose 5%. 1000 milliLiter(s) (100 mL/Hr) IV Continuous <Continuous>  dextrose 5%. 1000 milliLiter(s) (50 mL/Hr) IV Continuous <Continuous>  dextrose 50% Injectable 12.5 Gram(s) IV Push once  dextrose 50% Injectable 25 Gram(s) IV Push once  dextrose 50% Injectable 25 Gram(s) IV Push once  dextrose 50% Injectable 25 Gram(s) IV Push once  droxidopa 200 milliGRAM(s) Oral <User Schedule>  droxidopa 200 milliGRAM(s) Oral every 8 hours  epoetin odalis (EPOGEN) Injectable 70106 Unit(s) IV Push <User Schedule>  ferrous    sulfate Liquid 300 milliGRAM(s) Oral daily  glucagon  Injectable 1 milliGRAM(s) IntraMuscular once  heparin  Infusion. 1000 Unit(s)/Hr (10 mL/Hr) IV Continuous <Continuous>  insulin lispro (ADMELOG) corrective regimen sliding scale   SubCutaneous every 6 hours  insulin NPH human recombinant 6 Unit(s) SubCutaneous every 6 hours  levETIRAcetam  Solution 1000 milliGRAM(s) Oral daily  levETIRAcetam  Solution 250 milliGRAM(s) Oral <User Schedule>  midodrine. 30 milliGRAM(s) Oral every 8 hours  norepinephrine Infusion 0.05 MICROgram(s)/kG/Min (6.23 mL/Hr) IV Continuous <Continuous>  pantoprazole  Injectable 40 milliGRAM(s) IV Push two times a day  petrolatum Ophthalmic Ointment 1 Application(s) Both EYES four times a day  sodium chloride 1 Gram(s) Oral two times a day    MEDICATIONS  (PRN):  acetaminophen   Oral Liquid .. 650 milliGRAM(s) Oral every 6 hours PRN Temp greater or equal to 38C (100.4F), Mild Pain (1 - 3)  dextrose Oral Gel 15 Gram(s) Oral once PRN Blood Glucose LESS THAN 70 milliGRAM(s)/deciliter  diphenhydrAMINE Elixir 50 milliGRAM(s) Oral once PRN Rash and/or Itching  sodium chloride 0.9% Bolus. 250 milliLiter(s) IV Bolus every 5 minutes PRN SBP LESS THAN or EQUAL to 90 mmHg  sodium chloride 0.9% lock flush 10 milliLiter(s) IV Push every 1 hour PRN Pre/post blood products, medications, blood draw, and to maintain line patency       Vitals:  Vital Signs Last 24 Hrs  T(C): 37.1 (23 Oct 2023 16:00), Max: 37.7 (23 Oct 2023 04:00)  T(F): 98.8 (23 Oct 2023 16:00), Max: 99.8 (23 Oct 2023 04:00)  HR: 123 (23 Oct 2023 19:00) (105 - 123)  BP: 103/46 (23 Oct 2023 19:00) (94/49 - 132/63)  BP(mean): 61 (23 Oct 2023 19:00) (43 - 88)  RR: 16 (23 Oct 2023 19:00) (12 - 24)  SpO2: 100% (23 Oct 2023 19:00) (93% - 100%)    Parameters below as of 23 Oct 2023 19:00  Patient On (Oxygen Delivery Method): ventilator    O2 Concentration (%): 35            Neurological Exam:  Mental Status: Eyes closed, off sedation. Does not follow commands,  + trach to vent and NGT   Cranial Nerves: PERRL slightly sluggish, L subconjunctival hemorrhage improved  Motor: Moves upper extremities spontaneously R >L, tremor R > L  no movement noted in lowers  Sensation: WD to noxious x4    I personally reviewed the below data/images/labs:       LABS:                          8.1    15.11 )-----------( 450      ( 23 Oct 2023 02:30 )             26.3     10-23    138  |  102  |  27<H>  ----------------------------<  154<H>  4.1   |  26  |  1.59<H>    Ca    8.5      23 Oct 2023 02:30  Phos  3.8     10-23  Mg     2.10     10-23    TPro  5.5<L>  /  Alb  1.9<L>  /  TBili  <0.2  /  DBili  x   /  AST  23  /  ALT  10  /  AlkPhos  240<H>  10-23    LIVER FUNCTIONS - ( 23 Oct 2023 02:30 )  Alb: 1.9 g/dL / Pro: 5.5 g/dL / ALK PHOS: 240 U/L / ALT: 10 U/L / AST: 23 U/L / GGT: x           PT/INR - ( 22 Oct 2023 04:00 )   PT: 18.2 sec;   INR: 1.65 ratio         PTT - ( 23 Oct 2023 09:00 )  PTT:85.2 sec  Urinalysis Basic - ( 23 Oct 2023 02:30 )    Color: x / Appearance: x / SG: x / pH: x  Gluc: 154 mg/dL / Ketone: x  / Bili: x / Urobili: x   Blood: x / Protein: x / Nitrite: x   Leuk Esterase: x / RBC: x / WBC x   Sq Epi: x / Non Sq Epi: x / Bacteria: x            < from: CT Head No Cont (08.15.23 @ 17:20) >    ACC: [id number] EXAM:  CT BRAIN   ORDERED BY: IMNAL SAM     PROCEDURE DATE:  08/15/2023          INTERPRETATION:  Clinical indication: Change in neuro exam.    Multiple axial sections were performed from base to vertex without   contrast enhancement. Coronal and sagittal reconstructions were   reformatted well.    This exam is compared prior head CT performed on August 11, 2023    Parenchymal volume loss and chronic microvessel ischemic changes are   again seen.    Abnormal low-attenuation involving the left occipital cortical   subcortical region is again seen. 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    No evidence of acute hemorrhage mass or mass effect is seen.    Evaluation of the osseous structures with appropriate window demonstrates   sclerotic changes about the left mastoid region which appears stable.   Opacification left middle ear region is again seen.    Patient is status post bilateral cataract surgery.    Impression: Stable exam.    < end of copied text >    vEEG:    Clinical Impression:  - Severe diffuse non-specific cerebral dysfunction  - There were no epileptiform abnormalities or seizures recorded.        CTH 8/11:    VENTRICLES AND SULCI: Age-appropriate involutional change  INTRA-AXIAL:  Old left PCA infarct as seen on the prior unchanged.   Microvascular ischemic changes involving the periventricular and   subcortical white matter as seen previously  EXTRA-AXIAL:  No mass or collection is seen.  VISUALIZED SINUSES:  Clear.  VISUALIZED MASTOIDS: Left mastoid sclerosis  CALVARIUM: Infiltrative appearance tothe calvarium may be indicative of   marrow infiltration on the basis of patient's known diagnosis of breast   cancer. MISCELLANEOUS:  None.    IMPRESSION:  No significant interval change compared with 7/17/2023 in   left PCA infarct which is old. Microvascular ischemic changes involving   the periventricular and subcortical white matter as seen   previously.Questionable lesions at the level of the calvarium related to   possible breast CA. Clinical correlation recommended.    --- End of Report ---      ct< from: CT Head No Cont (09.26.23 @ 21:02) >  IMPRESSION: Vague questionable areas of hypoattenuation within the   bilateral cerebellar hemispheres which may be compatible with   acute/subacute ischemia. Recommend further evaluation with a brain MRI   study, provided there are no MRI contraindications.    No acute intracranial hemorrhage.    Previously seen questionable vague wedge-shaped area of hypoattenuation   in the left parietal lobe with artifactual secondary to motion and volume   averaging with a prominent sulcus.    Chronic left occipital lobe infarct.    < end of copied text >    < from: CT Head No Cont (10.03.23 @ 20:46) >    IMPRESSION:    No acute intracranial hemorrhage or mass effect. Evaluation of the   posterior fossa degraded by streak artifact from dental amalgam.   Lucencies in the bilateral cerebellum may be artifactual.    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    Bilateral middle ear and mastoid effusions which are also seen on prior   exam.    EEG Classification / Summary:  Abnormal EEG in the awake, drowsy states.   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   -No seizures

## 2023-10-23 NOTE — PROGRESS NOTE ADULT - SUBJECTIVE AND OBJECTIVE BOX
Follow Up:  MSSA bacteremia, otitis external, VAP, fever    Interval History: pt with low grade fever last night, lililey was removed, plan for new shiley    ROS:  unable to obtain because: trached, AMS        Allergies  isoniazid (Rash)  nafcillin (Unknown)  hydrALAZINE (Rash)  vitamin E (Short breath; Urticaria; Hives)  doxycycline (Rash)  cefepime (Rash)  NIFEdipine (Urticaria; Hives)  Zosyn (Rash)        ANTIMICROBIALS:  aztreonam  IVPB 2000 <User Schedule>      OTHER MEDS:  acetaminophen   Oral Liquid .. 650 milliGRAM(s) Oral every 6 hours PRN  albuterol    0.083% 2.5 milliGRAM(s) Nebulizer every 6 hours  alteplase for catheter clearance 2 milliGRAM(s) Catheter once  alteplase for catheter clearance 2 milliGRAM(s) Catheter once  artificial tears (preservative free) Ophthalmic Solution 1 Drop(s) Both EYES every 4 hours  atorvastatin 10 milliGRAM(s) Oral at bedtime  chlorhexidine 0.12% Liquid 15 milliLiter(s) Oral Mucosa every 12 hours  chlorhexidine 2% Cloths 1 Application(s) Topical daily  dextrose 5%. 1000 milliLiter(s) IV Continuous <Continuous>  dextrose 5%. 1000 milliLiter(s) IV Continuous <Continuous>  dextrose 50% Injectable 25 Gram(s) IV Push once  dextrose 50% Injectable 25 Gram(s) IV Push once  dextrose 50% Injectable 12.5 Gram(s) IV Push once  dextrose 50% Injectable 25 Gram(s) IV Push once  dextrose Oral Gel 15 Gram(s) Oral once PRN  diphenhydrAMINE Elixir 50 milliGRAM(s) Oral once PRN  droxidopa 200 milliGRAM(s) Oral every 8 hours  droxidopa 200 milliGRAM(s) Oral <User Schedule>  epoetin odalis (EPOGEN) Injectable 84308 Unit(s) IV Push <User Schedule>  ferrous    sulfate Liquid 300 milliGRAM(s) Oral daily  glucagon  Injectable 1 milliGRAM(s) IntraMuscular once  heparin  Infusion. 1000 Unit(s)/Hr IV Continuous <Continuous>  insulin lispro (ADMELOG) corrective regimen sliding scale   SubCutaneous every 6 hours  insulin NPH human recombinant 6 Unit(s) SubCutaneous every 6 hours  levETIRAcetam  Solution 1000 milliGRAM(s) Oral daily  levETIRAcetam  Solution 250 milliGRAM(s) Oral <User Schedule>  midodrine. 30 milliGRAM(s) Oral every 8 hours  norepinephrine Infusion 0.05 MICROgram(s)/kG/Min IV Continuous <Continuous>  pantoprazole  Injectable 40 milliGRAM(s) IV Push two times a day  petrolatum Ophthalmic Ointment 1 Application(s) Both EYES four times a day  sodium chloride 1 Gram(s) Oral two times a day  sodium chloride 0.9% Bolus. 250 milliLiter(s) IV Bolus every 5 minutes PRN  sodium chloride 0.9% lock flush 10 milliLiter(s) IV Push every 1 hour PRN      Vital Signs Last 24 Hrs  T(C): 37.1 (23 Oct 2023 12:00), Max: 37.7 (23 Oct 2023 04:00)  T(F): 98.7 (23 Oct 2023 12:00), Max: 99.8 (23 Oct 2023 04:00)  HR: 115 (23 Oct 2023 13:00) (105 - 122)  BP: 109/45 (23 Oct 2023 13:00) (94/49 - 136/41)  BP(mean): 64 (23 Oct 2023 13:00) (43 - 88)  RR: 24 (23 Oct 2023 13:00) (12 - 24)  SpO2: 100% (23 Oct 2023 13:00) (96% - 100%)    Parameters below as of 23 Oct 2023 13:00  Patient On (Oxygen Delivery Method): ventilator    O2 Concentration (%): 35    Physical Exam:  General:    trached   Respiratory:   on vent  abd:  distended but soft  :  no  willard  Musculoskeletal : generalized edema  Skin: improved erythematous rash  vascular: TRISHA odom removed                          8.1    15.11 )-----------( 450      ( 23 Oct 2023 02:30 )             26.3       10-23    138  |  102  |  27<H>  ----------------------------<  154<H>  4.1   |  26  |  1.59<H>    Ca    8.5      23 Oct 2023 02:30  Phos  3.8     10-23  Mg     2.10     10-23    TPro  5.5<L>  /  Alb  1.9<L>  /  TBili  <0.2  /  DBili  x   /  AST  23  /  ALT  10  /  AlkPhos  240<H>  10-23      Urinalysis Basic - ( 23 Oct 2023 02:30 )    Color: x / Appearance: x / SG: x / pH: x  Gluc: 154 mg/dL / Ketone: x  / Bili: x / Urobili: x   Blood: x / Protein: x / Nitrite: x   Leuk Esterase: x / RBC: x / WBC x   Sq Epi: x / Non Sq Epi: x / Bacteria: x        MICROBIOLOGY:  v  .Blood Blood-Venous  10-17-23   No growth at 5 days  --  --      .Blood Blood-Peripheral  10-14-23   No growth at 5 days  --  --      Trach Asp Tracheal Aspirate  10-14-23   Numerous Pseudomonas aeruginosa (Carbapenem Resistant)  Numerous Proteus mirabilis  Normal Respiratory Cate present  --  Pseudomonas aeruginosa (Carbapenem Resistant)  Proteus mirabilis      .Blood Blood-Venous  10-09-23   No growth at 5 days  --  --      Trach Asp Tracheal Aspirate  10-03-23   Numerous Pseudomonas aeruginosa (Carbapenem Resistant) Susceptibility to  follow.  Normal Respiratory Ctae absent  --  Pseudomonas aeruginosa (Carbapenem Resistant)      .Blood Blood-Venous  09-29-23   No growth at 5 days  --  --      .Blood Blood-Peripheral  09-29-23   No growth at 5 days  --  --      ET Tube ET Tube  09-29-23   Rare Yeast  --  --      ET Tube ET Tube  09-29-23   Numerous Proteus mirabilis  Moderate Pseudomonas aeruginosa (Carbapenem Resistant)  Normal Respiratory Cate absent  --  Proteus mirabilis  Pseudomonas aeruginosa (Carbapenem Resistant)      Clean Catch Clean Catch (Midstream)  09-26-23   10,000 - 49,000 CFU/mL Candida albicans "Susceptibilities not performed"  --  --      .Blood Blood-Peripheral  09-26-23   No growth at 5 days  --  --          C Diff by PCR Result: NotDetec (10-21 @ 16:16)    C Diff by PCR Result: NotDetec (10-21-23 @ 16:16)      RADIOLOGY:  Images independently visualized and reviewed personally, findings as below  < from: Xray Chest 1 View- PORTABLE-Urgent (Xray Chest 1 View- PORTABLE-Urgent .) (10.19.23 @ 15:54) >  IMPRESSION:    Possible left infiltrate with small effusions.      < end of copied text >  < from: TTE W or WO Ultrasound Enhancing Agent (10.01.23 @ 10:07) >  CONCLUSIONS:      1. Left ventricular systolic function is normal with an ejection fraction visually estimated at 60 to 65 %.   2. The right ventricle is not well visualized.   3. Although there is no evidence of endocarditis on this study, the valves are not visualized well. Suggest STEPHANIE if clinically appropriate.    < end of copied text >

## 2023-10-23 NOTE — PROGRESS NOTE ADULT - ASSESSMENT
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 While in MICU patient noted with worsening renal failure requiring HD initiation, CGE/ Melena s/p EGD 8/31 found with esophagitis and erosive gastropathy, new right cerebellar infarct and fevers second to ESBL COLI and MSSA VAP, MSSA bacteremia, left ear otitis externa and drug rxn to cephalosporins Course further complicated by prolonged vent time s/p tracheostomy 9/1 and transferred to RCU 9/3 completed by recurrent fevers with high PIP, respiratory acidosis and concern for volume overloaded.   CTH repeated 9/26 for concern for encephalopathy - has known now - chronic bilateral cerebellar infarcts, L PCA infarct. L parietal hypodensity seen on prior CT likely artifactual  Repeat CTH 10/3 - unchanged  EEG 10/5 with L posterocentral/temporal spikes/sharp waves - doubt true focal seizure upon further review of EEG     Impression:   ? Focal seizure - has hx of bilateral R > L tremors, now on Keppra  Metabolic encephalopathy related to shock, infection, doubt new stroke  L PCA stroke, ESUS s/p ILR, also with bilateral centrum semiovale watershed infarcts   Multiple embolic strokes on MRI 8/17 - R cerebellum, midbrain, multiple other tiny embolic infarcts.   Dementia   MSSA bacteremia, r/o endocarditis s/p Daptomycin  Acute popliteal DVT on Eliquis   Anemia     Recommendations   [] care per MICU for persistent hypotension  [] has multiple areas of epileptogenic potential on EEG, no clear seizure correlate seen. On Keppra but no improvement in mental status  [] consider MRI brain once stable but doubt will change mgmt  [] mgmt of hypotension per piumary team, remains full code  [] Keppra 500mg BID with extra 250mg after HD on HD days - will consider stopping to see if MS improves  [] Abx per ID  [] C/w home Atorvastatin 10 mg qhs   [] continue to address GOC with family, poor prognosis    Katty Charles DO  Vascular Neurology  Office [phone]

## 2023-10-23 NOTE — PROGRESS NOTE ADULT - ASSESSMENT
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       NEUROLOGY  #AMS  Multiple etiologies including active infection vs CVA.   - MR head performed w/ new infarct and old infarcts, EEG without seizure events but with high foci potential   - c/w ASA, lipitor  - c/w keppra      CARDIOVASCULAR  # Septic vs vasoplegic shock   Etiology likely septic iso active infection. Possible component of vasoplegic   - on midodrine and droxidopa  - on levo, capped at 1 pressor  -Will wean pressors with goal SBP >90    # HFpEF w/ decompensation   > ECHO w/ EF 59 with severe LVH and diastolic dysfunction   - fluid overloaded, HD to remove fluids     HEENT   # Left ear OE with acute on chronic left-sided otomastoiditis  - ENT evaluation (9/7) with no mastoid tenderness, however found with positive swelling and excoriation to left auricle and tenderness to manipulation of auricle. Debrided crusting of auricle and EAC evaluated with edema and unable to visualize TM. EAC debrided and found with watery exudate.   - CT IAC with acute on chronic left-sided otitis externa and acute on chronic left-sided otomastoiditis. Superimposed left-sided tympanosclerosis. 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   - HSV negative and Path (9/6) with normal appearing epithelial cells singly and in clusters devoid of viral cytopathic effect.     RESPIRATORY  # AHRF second to volume overload   - CKD vs HFpEF w/ hypercarbia 2/2 volume overload requiring intubation, trach, and HD initiation  - Continue on albuterol and chest PT  - Continue volume removal with HD      #tracheomalacia   - CT CHEST (9/8) with tracheomalacia, BL pleural effusions and continued consolidations     #mechanical ventilation   - changed vent to PC 24/35/8/40 with slight improvement in PIP (40s)     GI  # UGIB   - CGE x 1 episode on 8/24 and melena x 2 episodes on 8/31 likely residual blood.   - EGD (8/31) found with esophagitis and erosive gastropathy  - Continue on PPI BID    # Dysphagia   - NGT-TF continued   - Pending PEG however needs improvement prior to placement    RENAL  # Progressive CKD   - Presented volume overload and progressive RUSS on CKD   - HD MWF TIW with midodrine and droxidopa  - ICU for vasopressor assisted volume removal, cap to 1 pressor and not offering CRRT due to comorbidities and prognosis       INFECTIOUS DISEASE  # ESBL ECOLI and MSSA VAP c/b MSSA bacteremia   - hx of MSSA bacteremia, ESBL E. Coli sputum Cx, BAL w/ MSSA  - ECHO unable to rule out endocarditis   - treated with several rounds of antibiotics including vancomycin (c/b rashes), meropenem, and daptomycin.     # Proteus and  PSA VAP/ Trachitis   - Continued fevers and SCx (9/29) with MDR  PSA and Proteus   - Continued on Bradford as above however noted with resistance and changed to Zosyn (10/2 - 10/5) with course complicated by possible drug rxn. Zosyn changed to Aztreonam (10/5 - 10/6) however RPT SCx (10/3) with  PSA however only intermediate coverage to aztreonam and amikacin.  PSA grossly resistant to other antibiotics other than cephalosporins however patient with reactions in the past. Case discussed with ID and ID pharmacist and change to Polymyxin (10/6 - 10/15) however continues to spike fevers likely second to Polymyxin only intermediate coverage and Recarbio resistant .   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  - HH remained labile requiring xfusion (10/15) however noted with questionable reaction with rash and BB called. Work up being sent.   - Monitor HH     VASCULAR   # LLE POP DVT   - US BL LE (9/10) with acute left deep vein thrombosis of the left popliteal vein.  - RUE swollen and VA DOPPLER RUE (10/3) with R IJ DVT and R brachial DVT.  - Eliquis held for permacath however procedure held.   - Eliquis remains on hold second to mild suction trauma hemoptysis.   - Resume Eliquis when able    # HD access  - TRISHA TAYLOR (9/27 - )     ONC   # Hx of Breast CA   - Patient dx in 2018 and s/p BL mastectomy (radical on right) and chemo and RT.   - Supportive care.     ENDOCRINE  # IDDM2   - Continued on NPH with ISS, however noted with hypoglycemic episodes and NPH dc'ed.    - Finger sticks trending up off NPH.   - Continue on NPH 6U with moderate ISS.   - Adjust as need     SKIN  # Drug Eruption   - Attempted cefepime (8/12) vs ancef (8/13-8/14) and then noted with drug rxn in ICU. Continue on steroids with prednisone 40 (8/18 - 9/2) then prednisone 30 (9/3-9/7), then prednisone 20 (9/8 - 9/10), then prednisone 10mg (9/11-9/13) then turn off.   - Attempted Zosyn (10/2-10/5) with possible rash. Zosyn turned off with improvement.   - Hold further cephalosporins or PCNs at this time   - Monitor for further drug rxns.     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       NEUROLOGY  #AMS  Multiple etiologies including active infection vs CVA.   - MR head performed w/ new infarct and old infarcts, EEG without seizure events but with high foci potential   - c/w ASA, lipitor  - c/w keppra      CARDIOVASCULAR  # Septic vs vasoplegic shock   Etiology likely septic iso active infection. Possible component of vasoplegic   - on midodrine and droxidopa  - on levo, capped at 1 pressor  - will trial additional droxidopa before dialysis   - will wean pressors with goal SBP >90    # HFpEF w/ decompensation   > ECHO w/ EF 59 with severe LVH and diastolic dysfunction   - fluid overloaded, HD to remove fluids     HEENT   # Left ear OE with acute on chronic left-sided otomastoiditis  - ENT evaluation (9/7) with no mastoid tenderness, however found with positive swelling and excoriation to left auricle and tenderness to manipulation of auricle. Debrided crusting of auricle and EAC evaluated with edema and unable to visualize TM. EAC debrided and found with watery exudate.   - CT IAC with acute on chronic left-sided otitis externa and acute on chronic left-sided otomastoiditis. Superimposed left-sided tympanosclerosis. 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   - HSV negative and Path (9/6) with normal appearing epithelial cells singly and in clusters devoid of viral cytopathic effect.     RESPIRATORY  # AHRF second to volume overload   - CKD vs HFpEF w/ hypercarbia 2/2 volume overload requiring intubation, trach, and HD initiation  - Continue on albuterol and chest PT  - Continue volume removal with HD      #tracheomalacia   - CT CHEST (9/8) with tracheomalacia, BL pleural effusions and continued consolidations     #mechanical ventilation   - changed vent to PC 24/35/8/40 with slight improvement in PIP (40s)     GI  # UGIB   - CGE x 1 episode on 8/24 and melena x 2 episodes on 8/31 likely residual blood.   - EGD (8/31) found with esophagitis and erosive gastropathy  - Continue on PPI BID    # Dysphagia   - NGT-TF continued   - Pending PEG however needs improvement prior to placement    RENAL  # Progressive CKD   - Presented volume overload and progressive RUSS on CKD   - HD MWF TIW with midodrine and droxidopa  - ICU for vasopressor assisted volume removal, cap to 1 pressor and not offering CRRT due to comorbidities and prognosis       INFECTIOUS DISEASE  # ESBL ECOLI and MSSA VAP c/b MSSA bacteremia   - hx of MSSA bacteremia, ESBL E. Coli sputum Cx, BAL w/ MSSA  - ECHO unable to rule out endocarditis   - treated with several rounds of antibiotics including vancomycin (c/b rashes), meropenem, and daptomycin.     # Proteus and  PSA VAP/ Trachitis   - Continued fevers and SCx (9/29) with MDR  PSA and Proteus   - Continued on Bradford as above however noted with resistance and changed to Zosyn (10/2 - 10/5) with course complicated by possible drug rxn. Zosyn changed to Aztreonam (10/5 - 10/6) however RPT SCx (10/3) with  PSA however only intermediate coverage to aztreonam and amikacin.  PSA grossly resistant to other antibiotics other than cephalosporins however patient with reactions in the past. Case discussed with ID and ID pharmacist and change to Polymyxin (10/6 - 10/15) however continues to spike fevers likely second to Polymyxin only intermediate coverage and Recarbio resistant .   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  - HH remained labile requiring xfusion (10/15) however noted with questionable reaction with rash and BB called. Work up being sent.   - Monitor HH     VASCULAR   # LLE POP DVT   - US BL LE (9/10) with acute left deep vein thrombosis of the left popliteal vein.  - RUE swollen and VA DOPPLER RUE (10/3) with R IJ DVT and R brachial DVT.  - Eliquis held for permacath however procedure held.   - Eliquis remains on hold second to mild suction trauma hemoptysis.   - Resume Eliquis when able    # HD access  - TRISHA TAYLOR (9/27 - 10/21)  - will place new central access prior to dialysis    ONC   # Hx of Breast CA   - Patient dx in 2018 and s/p BL mastectomy (radical on right) and chemo and RT.   - Supportive care.     ENDOCRINE  # IDDM2   - Continued on NPH with ISS, however noted with hypoglycemic episodes and NPH dc'ed.    - Finger sticks trending up off NPH.   - Continue on NPH 6U with moderate ISS.   - Adjust as need     SKIN  # Drug Eruption   - Attempted cefepime (8/12) vs ancef (8/13-8/14) and then noted with drug rxn in ICU. Continue on steroids with prednisone 40 (8/18 - 9/2) then prednisone 30 (9/3-9/7), then prednisone 20 (9/8 - 9/10), then prednisone 10mg (9/11-9/13) then turn off.   - Attempted Zosyn (10/2-10/5) with possible rash. Zosyn turned off with improvement.   - Hold further cephalosporins or PCNs at this time   - Monitor for further drug rxns.     ETHICS/ GOC    - Attempted palliative discussion however family not interested and wishes for FULL CODE  - Family continues to want everything done despite HD failure on multiple oral pressors    DISPO - TBD

## 2023-10-23 NOTE — PROGRESS NOTE ADULT - SUBJECTIVE AND OBJECTIVE BOX
Progress Note    08-11-23 (71d)    Patient is a 73y old  Female who presents with a chief complaint of Respiratory distress (22 Oct 2023 15:02)      Subjective / Overnight Events :  - No acute events overnight.  - Pt seen and examined at bedside.     Additional ROS (if any):    MEDICATIONS  (STANDING):  albuterol    0.083% 2.5 milliGRAM(s) Nebulizer every 6 hours  alteplase for catheter clearance 2 milliGRAM(s) Catheter once  alteplase for catheter clearance 2 milliGRAM(s) Catheter once  artificial tears (preservative free) Ophthalmic Solution 1 Drop(s) Both EYES every 4 hours  atorvastatin 10 milliGRAM(s) Oral at bedtime  aztreonam  IVPB 2000 milliGRAM(s) IV Intermittent <User Schedule>  chlorhexidine 0.12% Liquid 15 milliLiter(s) Oral Mucosa every 12 hours  chlorhexidine 2% Cloths 1 Application(s) Topical daily  dextrose 5%. 1000 milliLiter(s) (50 mL/Hr) IV Continuous <Continuous>  dextrose 5%. 1000 milliLiter(s) (100 mL/Hr) IV Continuous <Continuous>  dextrose 50% Injectable 12.5 Gram(s) IV Push once  dextrose 50% Injectable 25 Gram(s) IV Push once  dextrose 50% Injectable 25 Gram(s) IV Push once  dextrose 50% Injectable 25 Gram(s) IV Push once  droxidopa 200 milliGRAM(s) Oral three times a day  epoetin odalis (EPOGEN) Injectable 17069 Unit(s) IV Push <User Schedule>  ferrous    sulfate Liquid 300 milliGRAM(s) Oral daily  glucagon  Injectable 1 milliGRAM(s) IntraMuscular once  heparin  Infusion. 1000 Unit(s)/Hr (10 mL/Hr) IV Continuous <Continuous>  insulin lispro (ADMELOG) corrective regimen sliding scale   SubCutaneous every 6 hours  insulin NPH human recombinant 6 Unit(s) SubCutaneous every 6 hours  levETIRAcetam  Solution 1000 milliGRAM(s) Oral daily  levETIRAcetam  Solution 250 milliGRAM(s) Oral <User Schedule>  midodrine. 30 milliGRAM(s) Oral every 8 hours  norepinephrine Infusion 0.05 MICROgram(s)/kG/Min (6.23 mL/Hr) IV Continuous <Continuous>  pantoprazole  Injectable 40 milliGRAM(s) IV Push two times a day  petrolatum Ophthalmic Ointment 1 Application(s) Both EYES four times a day  sodium chloride 1 Gram(s) Oral two times a day    MEDICATIONS  (PRN):  acetaminophen   Oral Liquid .. 650 milliGRAM(s) Oral every 6 hours PRN Temp greater or equal to 38C (100.4F), Mild Pain (1 - 3)  dextrose Oral Gel 15 Gram(s) Oral once PRN Blood Glucose LESS THAN 70 milliGRAM(s)/deciliter  diphenhydrAMINE Elixir 50 milliGRAM(s) Oral once PRN Rash and/or Itching  sodium chloride 0.9% Bolus. 250 milliLiter(s) IV Bolus every 5 minutes PRN SBP LESS THAN or EQUAL to 90 mmHg  sodium chloride 0.9% lock flush 10 milliLiter(s) IV Push every 1 hour PRN Pre/post blood products, medications, blood draw, and to maintain line patency          PHYSICAL EXAM:  Vital Signs Last 24 Hrs  T(C): 37.7 (23 Oct 2023 04:00), Max: 37.7 (23 Oct 2023 04:00)  T(F): 99.8 (23 Oct 2023 04:00), Max: 99.8 (23 Oct 2023 04:00)  HR: 122 (23 Oct 2023 06:00) (109 - 126)  BP: 127/68 (23 Oct 2023 06:00) (94/49 - 145/22)  BP(mean): 81 (23 Oct 2023 06:00) (43 - 81)  RR: 22 (23 Oct 2023 06:00) (12 - 24)  SpO2: 100% (23 Oct 2023 06:00) (96% - 100%)    Parameters below as of 23 Oct 2023 06:00  Patient On (Oxygen Delivery Method): ventilator    O2 Concentration (%): 35    I&O's Summary    22 Oct 2023 07:01  -  23 Oct 2023 07:00  --------------------------------------------------------  IN: 1035.2 mL / OUT: 0 mL / NET: 1035.2 mL        General: NAD, non-toxic appearing   HEENT: PERRLA, EOMi, no scleral icterus  CV: RRR, normal S1 and S2, no m/r/g  Lungs: normal respiratory effort. CTAB, no wheezes, rales, or rhonchi  Abd: soft, nontender, nondistended  Ext: no edema, 2+ peripheral pulses   Pysch: AAOx3, appropriate affect   Neuro: grossly non-focal, moving all extremities spontaneously   Skin: no rashes or lesions     LABS:  CAPILLARY BLOOD GLUCOSE      POCT Blood Glucose.: 190 mg/dL (23 Oct 2023 05:21)  POCT Blood Glucose.: 118 mg/dL (22 Oct 2023 22:21)  POCT Blood Glucose.: 105 mg/dL (22 Oct 2023 17:23)  POCT Blood Glucose.: 162 mg/dL (22 Oct 2023 11:04)                              8.1    15.11 )-----------( 450      ( 23 Oct 2023 02:30 )             26.3       WBC Trend: 15.11<--, 15.73<--, 16.35<--  Hb Trend: 8.1<--, 8.5<--, 8.3<--, 8.4<--, 7.9<--    10-23    138  |  102  |  27<H>  ----------------------------<  154<H>  4.1   |  26  |  1.59<H>    Ca    8.5      23 Oct 2023 02:30  Phos  3.8     10-23  Mg     2.10     10-23    TPro  5.5<L>  /  Alb  1.9<L>  /  TBili  <0.2  /  DBili  x   /  AST  23  /  ALT  10  /  AlkPhos  240<H>  10-23    PT/INR - ( 22 Oct 2023 04:00 )   PT: 18.2 sec;   INR: 1.65 ratio         PTT - ( 23 Oct 2023 02:30 )  PTT:109.6 sec      Urinalysis Basic - ( 23 Oct 2023 02:30 )    Color: x / Appearance: x / SG: x / pH: x  Gluc: 154 mg/dL / Ketone: x  / Bili: x / Urobili: x   Blood: x / Protein: x / Nitrite: x   Leuk Esterase: x / RBC: x / WBC x   Sq Epi: x / Non Sq Epi: x / Bacteria: x            RADIOLOGY & ADDITIONAL TESTS: Reviewed Progress Note    08-11-23 (71d)    Patient is a 73y old  Female who presents with a chief complaint of Respiratory distress (22 Oct 2023 15:02)      Subjective / Overnight Events :  - No acute events overnight.  - Pt seen and examined at bedside.     Additional ROS (if any):    MEDICATIONS  (STANDING):  albuterol    0.083% 2.5 milliGRAM(s) Nebulizer every 6 hours  alteplase for catheter clearance 2 milliGRAM(s) Catheter once  alteplase for catheter clearance 2 milliGRAM(s) Catheter once  artificial tears (preservative free) Ophthalmic Solution 1 Drop(s) Both EYES every 4 hours  atorvastatin 10 milliGRAM(s) Oral at bedtime  aztreonam  IVPB 2000 milliGRAM(s) IV Intermittent <User Schedule>  chlorhexidine 0.12% Liquid 15 milliLiter(s) Oral Mucosa every 12 hours  chlorhexidine 2% Cloths 1 Application(s) Topical daily  dextrose 5%. 1000 milliLiter(s) (50 mL/Hr) IV Continuous <Continuous>  dextrose 5%. 1000 milliLiter(s) (100 mL/Hr) IV Continuous <Continuous>  dextrose 50% Injectable 12.5 Gram(s) IV Push once  dextrose 50% Injectable 25 Gram(s) IV Push once  dextrose 50% Injectable 25 Gram(s) IV Push once  dextrose 50% Injectable 25 Gram(s) IV Push once  droxidopa 200 milliGRAM(s) Oral three times a day  epoetin odalis (EPOGEN) Injectable 92147 Unit(s) IV Push <User Schedule>  ferrous    sulfate Liquid 300 milliGRAM(s) Oral daily  glucagon  Injectable 1 milliGRAM(s) IntraMuscular once  heparin  Infusion. 1000 Unit(s)/Hr (10 mL/Hr) IV Continuous <Continuous>  insulin lispro (ADMELOG) corrective regimen sliding scale   SubCutaneous every 6 hours  insulin NPH human recombinant 6 Unit(s) SubCutaneous every 6 hours  levETIRAcetam  Solution 1000 milliGRAM(s) Oral daily  levETIRAcetam  Solution 250 milliGRAM(s) Oral <User Schedule>  midodrine. 30 milliGRAM(s) Oral every 8 hours  norepinephrine Infusion 0.05 MICROgram(s)/kG/Min (6.23 mL/Hr) IV Continuous <Continuous>  pantoprazole  Injectable 40 milliGRAM(s) IV Push two times a day  petrolatum Ophthalmic Ointment 1 Application(s) Both EYES four times a day  sodium chloride 1 Gram(s) Oral two times a day    MEDICATIONS  (PRN):  acetaminophen   Oral Liquid .. 650 milliGRAM(s) Oral every 6 hours PRN Temp greater or equal to 38C (100.4F), Mild Pain (1 - 3)  dextrose Oral Gel 15 Gram(s) Oral once PRN Blood Glucose LESS THAN 70 milliGRAM(s)/deciliter  diphenhydrAMINE Elixir 50 milliGRAM(s) Oral once PRN Rash and/or Itching  sodium chloride 0.9% Bolus. 250 milliLiter(s) IV Bolus every 5 minutes PRN SBP LESS THAN or EQUAL to 90 mmHg  sodium chloride 0.9% lock flush 10 milliLiter(s) IV Push every 1 hour PRN Pre/post blood products, medications, blood draw, and to maintain line patency          PHYSICAL EXAM:  Vital Signs Last 24 Hrs  T(C): 37.7 (23 Oct 2023 04:00), Max: 37.7 (23 Oct 2023 04:00)  T(F): 99.8 (23 Oct 2023 04:00), Max: 99.8 (23 Oct 2023 04:00)  HR: 122 (23 Oct 2023 06:00) (109 - 126)  BP: 127/68 (23 Oct 2023 06:00) (94/49 - 145/22)  BP(mean): 81 (23 Oct 2023 06:00) (43 - 81)  RR: 22 (23 Oct 2023 06:00) (12 - 24)  SpO2: 100% (23 Oct 2023 06:00) (96% - 100%)    Parameters below as of 23 Oct 2023 06:00  Patient On (Oxygen Delivery Method): ventilator    O2 Concentration (%): 35    I&O's Summary    22 Oct 2023 07:01  -  23 Oct 2023 07:00  --------------------------------------------------------  IN: 1035.2 mL / OUT: 0 mL / NET: 1035.2 mL        General: intubated, minimal response   HEENT: PERRLA, EOMi, no scleral icterus  CV: +tachycardia   Lungs: +coarse breath sounds bilaterally   Abd: soft, nontender, nondistended  Ext: 2+ pitting edema bilaterally   Skin: +diffuse rash     LABS:  CAPILLARY BLOOD GLUCOSE      POCT Blood Glucose.: 190 mg/dL (23 Oct 2023 05:21)  POCT Blood Glucose.: 118 mg/dL (22 Oct 2023 22:21)  POCT Blood Glucose.: 105 mg/dL (22 Oct 2023 17:23)  POCT Blood Glucose.: 162 mg/dL (22 Oct 2023 11:04)                              8.1    15.11 )-----------( 450      ( 23 Oct 2023 02:30 )             26.3       WBC Trend: 15.11<--, 15.73<--, 16.35<--  Hb Trend: 8.1<--, 8.5<--, 8.3<--, 8.4<--, 7.9<--    10-23    138  |  102  |  27<H>  ----------------------------<  154<H>  4.1   |  26  |  1.59<H>    Ca    8.5      23 Oct 2023 02:30  Phos  3.8     10-23  Mg     2.10     10-23    TPro  5.5<L>  /  Alb  1.9<L>  /  TBili  <0.2  /  DBili  x   /  AST  23  /  ALT  10  /  AlkPhos  240<H>  10-23    PT/INR - ( 22 Oct 2023 04:00 )   PT: 18.2 sec;   INR: 1.65 ratio         PTT - ( 23 Oct 2023 02:30 )  PTT:109.6 sec      Urinalysis Basic - ( 23 Oct 2023 02:30 )    Color: x / Appearance: x / SG: x / pH: x  Gluc: 154 mg/dL / Ketone: x  / Bili: x / Urobili: x   Blood: x / Protein: x / Nitrite: x   Leuk Esterase: x / RBC: x / WBC x   Sq Epi: x / Non Sq Epi: x / Bacteria: x            RADIOLOGY & ADDITIONAL TESTS: Reviewed

## 2023-10-24 NOTE — PROGRESS NOTE ADULT - ASSESSMENT
74 y/o F well known to me from my housecal outpt practice. she was admitted at [facility] 7/12-7/22 w aspiration PNA, was treated w CEFEPIME, developed an allergic rash,  dCHF, + MAC on AFB culture, had been progressively getting more and more lethargic and dyspneic at home since DC.   In  am of 8/11/23  ptn presented with respiratory distress w hypoxia and hypercarbia requiring intubation 2/2 volume overload +/- Asp PNA      Neuro   not responsive  Baseline MS AOx3, aphasic   - h/o CVA , on aspirin and statin . resumed w feeding tube, ASA resumed 8/26  - eeg  2/2 tremors, no sz focus, on KEPPRA  - less responsive in the past few days  - MRI 8/17:  new R cerebellar infarct, old left PCA/Occipital infarct. probably embolic in nature, did not tolerate full AC in the past, STEPHANIE is neg , no shunts observed  - ptn is poorly reactive, rpt scan done, no further CVA seen.     Cardiac   cardiology following  CHFpEF   TTE 7/2023 with EF 59%, with severe LVH and diastolic dysfunction   on Levo drip 2/2 hypotension    Pulmonary   Acute hypercapnic and hypoxemic respiratory failure   prolonged intubation, now  trache to  vent, has copious secretions      Renal   on HD via L IJ [place], tunneled catheter when clinically stable  HD MWF, midodrine assisted, PUF in between HD days, unable to remove proper volume 2/2 hypotension.   permacath when clinically stable      GI  NPO  on tube feeds  coffee ground emesis x 1 8/24, melena overnight 8/31, s/p 1UPRBC, EGD/Colonoscopy: erosive gastropathy, esophagisits, on colonoscopy old blood seen no active bleeding, poor prep  family to decide re PEG placement    Endocrine  on Insulin: NPH, Admelog    ID   complicated infectious hospital course  treated for MSSA bacteremia, aspiration PNA.  sputum + for pseudomonas, ptn was initially on zosyn but was allergic, then was switched to aztreonam   Aztreonam was switched to polymyxin for a possible allergy but ptn didnt have a reaction to aztreonam, she had a reaction to Zosyn.   spike temps again while off Abx, Aztreonam resumed 10/17        ID course complicated with muliple ABx allergies  also treated for Left O. 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 and she remains full code at present

## 2023-10-24 NOTE — PROGRESS NOTE ADULT - SUBJECTIVE AND OBJECTIVE BOX
Patient is a 73y old  Female who presents with a chief complaint of Respiratory distress (24 Oct 2023 13:08)      SUBJECTIVE / OVERNIGHT EVENTS: ptn is on Levo drip, not responsive, trache to vent, on Aztreonam again since 10/17. ON HD as per renal    MEDICATIONS  (STANDING):  albuterol    0.083% 2.5 milliGRAM(s) Nebulizer every 6 hours  apixaban 5 milliGRAM(s) Oral every 12 hours  artificial tears (preservative free) Ophthalmic Solution 1 Drop(s) Both EYES every 4 hours  atorvastatin 10 milliGRAM(s) Oral at bedtime  aztreonam  IVPB 2000 milliGRAM(s) IV Intermittent <User Schedule>  chlorhexidine 0.12% Liquid 15 milliLiter(s) Oral Mucosa every 12 hours  chlorhexidine 2% Cloths 1 Application(s) Topical daily  dextrose 5%. 1000 milliLiter(s) (100 mL/Hr) IV Continuous <Continuous>  dextrose 5%. 1000 milliLiter(s) (50 mL/Hr) IV Continuous <Continuous>  dextrose 50% Injectable 12.5 Gram(s) IV Push once  dextrose 50% Injectable 25 Gram(s) IV Push once  dextrose 50% Injectable 25 Gram(s) IV Push once  dextrose 50% Injectable 25 Gram(s) IV Push once  droxidopa 200 milliGRAM(s) Oral <User Schedule>  droxidopa 400 milliGRAM(s) Oral every 8 hours  epoetin odalis (EPOGEN) Injectable 67866 Unit(s) IV Push <User Schedule>  ferrous    sulfate Liquid 300 milliGRAM(s) Oral daily  glucagon  Injectable 1 milliGRAM(s) IntraMuscular once  insulin lispro (ADMELOG) corrective regimen sliding scale   SubCutaneous every 6 hours  insulin NPH human recombinant 6 Unit(s) SubCutaneous every 6 hours  levETIRAcetam  Solution 1000 milliGRAM(s) Oral daily  levETIRAcetam  Solution 250 milliGRAM(s) Oral <User Schedule>  midodrine. 30 milliGRAM(s) Oral every 8 hours  norepinephrine Infusion 0.05 MICROgram(s)/kG/Min (6.23 mL/Hr) IV Continuous <Continuous>  pantoprazole  Injectable 40 milliGRAM(s) IV Push two times a day  petrolatum Ophthalmic Ointment 1 Application(s) Both EYES four times a day  sodium chloride 1 Gram(s) Oral two times a day    MEDICATIONS  (PRN):  acetaminophen   Oral Liquid .. 650 milliGRAM(s) Oral every 6 hours PRN Temp greater or equal to 38C (100.4F), Mild Pain (1 - 3)  dextrose Oral Gel 15 Gram(s) Oral once PRN Blood Glucose LESS THAN 70 milliGRAM(s)/deciliter  diphenhydrAMINE Elixir 50 milliGRAM(s) Oral once PRN Rash and/or Itching  sodium chloride 0.9% Bolus. 250 milliLiter(s) IV Bolus every 5 minutes PRN SBP LESS THAN or EQUAL to 90 mmHg  sodium chloride 0.9% lock flush 10 milliLiter(s) IV Push every 1 hour PRN Pre/post blood products, medications, blood draw, and to maintain line patency      Vital Signs Last 24 Hrs  T(F): 98.2 (10-24-23 @ 16:00), Max: 99.6 (10-24-23 @ 00:00)  HR: 119 (10-24-23 @ 18:00) (105 - 134)  BP: 91/64 (10-24-23 @ 18:00) (55/34 - 125/107)  RR: 24 (10-24-23 @ 18:00) (0 - 24)  SpO2: 100% (10-24-23 @ 18:00) (92% - 100%)  Telemetry:   CAPILLARY BLOOD GLUCOSE      POCT Blood Glucose.: 176 mg/dL (24 Oct 2023 17:38)  POCT Blood Glucose.: 133 mg/dL (24 Oct 2023 12:53)  POCT Blood Glucose.: 129 mg/dL (24 Oct 2023 05:09)  POCT Blood Glucose.: 160 mg/dL (23 Oct 2023 22:42)    I&O's Summary    23 Oct 2023 07:01  -  24 Oct 2023 07:00  --------------------------------------------------------  IN: 1360 mL / OUT: 0 mL / NET: 1360 mL    24 Oct 2023 07:01  -  24 Oct 2023 18:50  --------------------------------------------------------  IN: 984 mL / OUT: 1034 mL / NET: -50 mL        PHYSICAL EXAM:  GENERAL: NAD, well-developed  HEAD:  Atraumatic, Normocephalic  EYES: EOMI, PERRLA, conjunctiva and sclera clear  NECK: Supple, No JVD  CHEST/LUNG: Clear to auscultation bilaterally; No wheeze  HEART: Regular rate and rhythm; No murmurs, rubs, or gallops  ABDOMEN: Soft, Nontender, Nondistended; Bowel sounds present  EXTREMITIES:  2+ Peripheral Pulses, No clubbing, cyanosis, or edema  PSYCH: AAOx3  NEUROLOGY: non-focal  SKIN: No rashes or lesions    LABS:                        8.6    16.76 )-----------( 467      ( 24 Oct 2023 07:13 )             28.0     10-24    138  |  102  |  32<H>  ----------------------------<  150<H>  4.3   |  25  |  1.75<H>    Ca    8.6      24 Oct 2023 00:05  Phos  4.1     10-24  Mg     2.10     10-24    TPro  5.3<L>  /  Alb  1.9<L>  /  TBili  <0.2  /  DBili  x   /  AST  17  /  ALT  7   /  AlkPhos  242<H>  10-24    PTT - ( 24 Oct 2023 07:13 )  PTT:73.4 sec      Urinalysis Basic - ( 24 Oct 2023 00:05 )    Color: x / Appearance: x / SG: x / pH: x  Gluc: 150 mg/dL / Ketone: x  / Bili: x / Urobili: x   Blood: x / Protein: x / Nitrite: x   Leuk Esterase: x / RBC: x / WBC x   Sq Epi: x / Non Sq Epi: x / Bacteria: x        RADIOLOGY & ADDITIONAL TESTS:    Imaging Personally Reviewed:    Consultant(s) Notes Reviewed:      Care Discussed with Consultants/Other Providers:

## 2023-10-24 NOTE — PROGRESS NOTE ADULT - SUBJECTIVE AND OBJECTIVE BOX
Progress Note    08-11-23 (72d)    Patient is a 73y old  Female who presents with a chief complaint of Respiratory distress (23 Oct 2023 20:06)      Subjective / Overnight Events :  - No acute events overnight.  - Pt seen and examined at bedside.     Additional ROS (if any):    MEDICATIONS  (STANDING):  albuterol    0.083% 2.5 milliGRAM(s) Nebulizer every 6 hours  alteplase for catheter clearance 2 milliGRAM(s) Catheter once  alteplase for catheter clearance 2 milliGRAM(s) Catheter once  artificial tears (preservative free) Ophthalmic Solution 1 Drop(s) Both EYES every 4 hours  atorvastatin 10 milliGRAM(s) Oral at bedtime  aztreonam  IVPB 2000 milliGRAM(s) IV Intermittent <User Schedule>  chlorhexidine 0.12% Liquid 15 milliLiter(s) Oral Mucosa every 12 hours  chlorhexidine 2% Cloths 1 Application(s) Topical daily  dextrose 5%. 1000 milliLiter(s) (100 mL/Hr) IV Continuous <Continuous>  dextrose 5%. 1000 milliLiter(s) (50 mL/Hr) IV Continuous <Continuous>  dextrose 50% Injectable 12.5 Gram(s) IV Push once  dextrose 50% Injectable 25 Gram(s) IV Push once  dextrose 50% Injectable 25 Gram(s) IV Push once  dextrose 50% Injectable 25 Gram(s) IV Push once  droxidopa 200 milliGRAM(s) Oral every 8 hours  droxidopa 200 milliGRAM(s) Oral <User Schedule>  epoetin odalis (EPOGEN) Injectable 29878 Unit(s) IV Push <User Schedule>  ferrous    sulfate Liquid 300 milliGRAM(s) Oral daily  glucagon  Injectable 1 milliGRAM(s) IntraMuscular once  heparin  Infusion. 1000 Unit(s)/Hr (10 mL/Hr) IV Continuous <Continuous>  insulin lispro (ADMELOG) corrective regimen sliding scale   SubCutaneous every 6 hours  insulin NPH human recombinant 6 Unit(s) SubCutaneous every 6 hours  levETIRAcetam  Solution 1000 milliGRAM(s) Oral daily  levETIRAcetam  Solution 250 milliGRAM(s) Oral <User Schedule>  midodrine. 30 milliGRAM(s) Oral every 8 hours  norepinephrine Infusion 0.05 MICROgram(s)/kG/Min (6.23 mL/Hr) IV Continuous <Continuous>  pantoprazole  Injectable 40 milliGRAM(s) IV Push two times a day  petrolatum Ophthalmic Ointment 1 Application(s) Both EYES four times a day  sodium chloride 1 Gram(s) Oral two times a day    MEDICATIONS  (PRN):  acetaminophen   Oral Liquid .. 650 milliGRAM(s) Oral every 6 hours PRN Temp greater or equal to 38C (100.4F), Mild Pain (1 - 3)  dextrose Oral Gel 15 Gram(s) Oral once PRN Blood Glucose LESS THAN 70 milliGRAM(s)/deciliter  diphenhydrAMINE Elixir 50 milliGRAM(s) Oral once PRN Rash and/or Itching  sodium chloride 0.9% Bolus. 250 milliLiter(s) IV Bolus every 5 minutes PRN SBP LESS THAN or EQUAL to 90 mmHg  sodium chloride 0.9% lock flush 10 milliLiter(s) IV Push every 1 hour PRN Pre/post blood products, medications, blood draw, and to maintain line patency          PHYSICAL EXAM:  Vital Signs Last 24 Hrs  T(C): 37.6 (24 Oct 2023 00:00), Max: 37.6 (24 Oct 2023 00:00)  T(F): 99.6 (24 Oct 2023 00:00), Max: 99.6 (24 Oct 2023 00:00)  HR: 116 (24 Oct 2023 06:00) (105 - 126)  BP: 121/78 (24 Oct 2023 06:00) (92/44 - 132/63)  BP(mean): 89 (24 Oct 2023 06:00) (54 - 114)  RR: 24 (24 Oct 2023 06:00) (0 - 24)  SpO2: 100% (24 Oct 2023 06:00) (93% - 100%)    Parameters below as of 24 Oct 2023 06:00  Patient On (Oxygen Delivery Method): ventilator    O2 Concentration (%): 35    I&O's Summary    22 Oct 2023 07:01  -  23 Oct 2023 07:00  --------------------------------------------------------  IN: 1625.2 mL / OUT: 0 mL / NET: 1625.2 mL    23 Oct 2023 07:01  -  24 Oct 2023 06:58  --------------------------------------------------------  IN: 1360 mL / OUT: 0 mL / NET: 1360 mL        General: NAD, non-toxic appearing   HEENT: PERRLA, EOMi, no scleral icterus  CV: RRR, normal S1 and S2, no m/r/g  Lungs: normal respiratory effort. CTAB, no wheezes, rales, or rhonchi  Abd: soft, nontender, nondistended  Ext: no edema, 2+ peripheral pulses   Pysch: AAOx3, appropriate affect   Neuro: grossly non-focal, moving all extremities spontaneously   Skin: no rashes or lesions     LABS:  CAPILLARY BLOOD GLUCOSE      POCT Blood Glucose.: 129 mg/dL (24 Oct 2023 05:09)  POCT Blood Glucose.: 160 mg/dL (23 Oct 2023 22:42)  POCT Blood Glucose.: 149 mg/dL (23 Oct 2023 18:09)  POCT Blood Glucose.: 177 mg/dL (23 Oct 2023 12:40)                              8.0    13.88 )-----------( 454      ( 24 Oct 2023 00:05 )             26.1       WBC Trend: 13.88<--, 15.11<--, 15.73<--  Hb Trend: 8.0<--, 8.1<--, 8.5<--, 8.3<--, 8.4<--    10-24    138  |  102  |  32<H>  ----------------------------<  150<H>  4.3   |  25  |  1.75<H>    Ca    8.6      24 Oct 2023 00:05  Phos  4.1     10-24  Mg     2.10     10-24    TPro  5.3<L>  /  Alb  1.9<L>  /  TBili  <0.2  /  DBili  x   /  AST  17  /  ALT  7   /  AlkPhos  242<H>  10-24    PTT - ( 24 Oct 2023 00:05 )  PTT:26.7 sec      Urinalysis Basic - ( 24 Oct 2023 00:05 )    Color: x / Appearance: x / SG: x / pH: x  Gluc: 150 mg/dL / Ketone: x  / Bili: x / Urobili: x   Blood: x / Protein: x / Nitrite: x   Leuk Esterase: x / RBC: x / WBC x   Sq Epi: x / Non Sq Epi: x / Bacteria: x            RADIOLOGY & ADDITIONAL TESTS: Reviewed

## 2023-10-24 NOTE — PROGRESS NOTE ADULT - SUBJECTIVE AND OBJECTIVE BOX
Follow Up:  MSSA bacteremia, otitis external, VAP, fever    Interval History: no fever, s/p new ilene    ROS:  unable to obtain because: trached, AMS      Allergies  isoniazid (Rash)  nafcillin (Unknown)  hydrALAZINE (Rash)  vitamin E (Short breath; Urticaria; Hives)  doxycycline (Rash)  cefepime (Rash)  NIFEdipine (Urticaria; Hives)  Zosyn (Rash)        ANTIMICROBIALS:  aztreonam  IVPB 2000 <User Schedule>      OTHER MEDS:  acetaminophen   Oral Liquid .. 650 milliGRAM(s) Oral every 6 hours PRN  albuterol    0.083% 2.5 milliGRAM(s) Nebulizer every 6 hours  apixaban 5 milliGRAM(s) Oral every 12 hours  artificial tears (preservative free) Ophthalmic Solution 1 Drop(s) Both EYES every 4 hours  atorvastatin 10 milliGRAM(s) Oral at bedtime  chlorhexidine 0.12% Liquid 15 milliLiter(s) Oral Mucosa every 12 hours  chlorhexidine 2% Cloths 1 Application(s) Topical daily  dextrose 5%. 1000 milliLiter(s) IV Continuous <Continuous>  dextrose 5%. 1000 milliLiter(s) IV Continuous <Continuous>  dextrose 50% Injectable 12.5 Gram(s) IV Push once  dextrose 50% Injectable 25 Gram(s) IV Push once  dextrose 50% Injectable 25 Gram(s) IV Push once  dextrose 50% Injectable 25 Gram(s) IV Push once  dextrose Oral Gel 15 Gram(s) Oral once PRN  diphenhydrAMINE Elixir 50 milliGRAM(s) Oral once PRN  droxidopa 200 milliGRAM(s) Oral <User Schedule>  droxidopa 400 milliGRAM(s) Oral every 8 hours  epoetin odalis (EPOGEN) Injectable 46217 Unit(s) IV Push <User Schedule>  ferrous    sulfate Liquid 300 milliGRAM(s) Oral daily  glucagon  Injectable 1 milliGRAM(s) IntraMuscular once  insulin lispro (ADMELOG) corrective regimen sliding scale   SubCutaneous every 6 hours  insulin NPH human recombinant 6 Unit(s) SubCutaneous every 6 hours  levETIRAcetam  Solution 1000 milliGRAM(s) Oral daily  levETIRAcetam  Solution 250 milliGRAM(s) Oral <User Schedule>  midodrine. 30 milliGRAM(s) Oral every 8 hours  norepinephrine Infusion 0.05 MICROgram(s)/kG/Min IV Continuous <Continuous>  pantoprazole  Injectable 40 milliGRAM(s) IV Push two times a day  petrolatum Ophthalmic Ointment 1 Application(s) Both EYES four times a day  sodium chloride 1 Gram(s) Oral two times a day  sodium chloride 0.9% Bolus. 250 milliLiter(s) IV Bolus every 5 minutes PRN  sodium chloride 0.9% lock flush 10 milliLiter(s) IV Push every 1 hour PRN      Vital Signs Last 24 Hrs  T(C): 35.7 (24 Oct 2023 12:15), Max: 37.6 (24 Oct 2023 00:00)  T(F): 96.3 (24 Oct 2023 12:15), Max: 99.6 (24 Oct 2023 00:00)  HR: 131 (24 Oct 2023 12:15) (105 - 131)  BP: 113/90 (24 Oct 2023 12:15) (55/34 - 125/107)  BP(mean): 87 (24 Oct 2023 11:00) (42 - 114)  RR: 23 (24 Oct 2023 12:15) (0 - 24)  SpO2: 99% (24 Oct 2023 12:15) (92% - 100%)    Parameters below as of 24 Oct 2023 12:15  Patient On (Oxygen Delivery Method): ventilator        Physical Exam:  General:    trached   Respiratory:   on vent  abd:  distended but soft  :  no  willard  Musculoskeletal : generalized edema  Skin: resolved erythematous rash but hyperpigmented scaly on lower abd  vascular: galo odom                           8.6    16.76 )-----------( 467      ( 24 Oct 2023 07:13 )             28.0       10-24    138  |  102  |  32<H>  ----------------------------<  150<H>  4.3   |  25  |  1.75<H>    Ca    8.6      24 Oct 2023 00:05  Phos  4.1     10-24  Mg     2.10     10-24    TPro  5.3<L>  /  Alb  1.9<L>  /  TBili  <0.2  /  DBili  x   /  AST  17  /  ALT  7   /  AlkPhos  242<H>  10-24      Urinalysis Basic - ( 24 Oct 2023 00:05 )    Color: x / Appearance: x / SG: x / pH: x  Gluc: 150 mg/dL / Ketone: x  / Bili: x / Urobili: x   Blood: x / Protein: x / Nitrite: x   Leuk Esterase: x / RBC: x / WBC x   Sq Epi: x / Non Sq Epi: x / Bacteria: x        MICROBIOLOGY:  v  .Blood Blood-Venous  10-17-23   No growth at 5 days  --  --      .Blood Blood-Peripheral  10-14-23   No growth at 5 days  --  --      Trach Asp Tracheal Aspirate  10-14-23   Numerous Pseudomonas aeruginosa (Carbapenem Resistant)  Numerous Proteus mirabilis  Normal Respiratory Cate present  --  Pseudomonas aeruginosa (Carbapenem Resistant)  Proteus mirabilis      .Blood Blood-Venous  10-09-23   No growth at 5 days  --  --      Trach Asp Tracheal Aspirate  10-03-23   Numerous Pseudomonas aeruginosa (Carbapenem Resistant) Susceptibility to  follow.  Normal Respiratory Cate absent  --  Pseudomonas aeruginosa (Carbapenem Resistant)      .Blood Blood-Venous  09-29-23   No growth at 5 days  --  --      .Blood Blood-Peripheral  09-29-23   No growth at 5 days  --  --      ET Tube ET Tube  09-29-23   Rare Yeast  --  --      ET Tube ET Tube  09-29-23   Numerous Proteus mirabilis  Moderate Pseudomonas aeruginosa (Carbapenem Resistant)  Normal Respiratory Cate absent  --  Proteus mirabilis  Pseudomonas aeruginosa (Carbapenem Resistant)      Clean Catch Clean Catch (Midstream)  09-26-23   10,000 - 49,000 CFU/mL Candida albicans "Susceptibilities not performed"  --  --      .Blood Blood-Peripheral  09-26-23   No growth at 5 days  --  --          C Diff by PCR Result: NotDetec (10-21 @ 16:16)    C Diff by PCR Result: NotDetec (10-21-23 @ 16:16)      RADIOLOGY:  Images independently visualized and reviewed personally, findings as below  < from: Xray Chest 1 View- PORTABLE-Urgent (Xray Chest 1 View- PORTABLE-Urgent .) (10.24.23 @ 00:35) >  IMPRESSION:  Tubes and lines in place.    Pulmonary edema with right apical opacity unchanged.      < end of copied text >  < from: TTE W or WO Ultrasound Enhancing Agent (10.01.23 @ 10:07) >  CONCLUSIONS:      1. Left ventricular systolic function is normal with an ejection fraction visually estimated at 60 to 65 %.   2. The right ventricle is not well visualized.   3. Although there is no evidence of endocarditis on this study, the valves are not visualized well. Suggest STEPHANIE if clinically appropriate.    < end of copied text >

## 2023-10-24 NOTE — PROGRESS NOTE ADULT - SUBJECTIVE AND OBJECTIVE BOX
S:  Pt seen and examined. Still requiring pressors for HD        Medications: MEDICATIONS  (STANDING):  albuterol    0.083% 2.5 milliGRAM(s) Nebulizer every 6 hours  apixaban 5 milliGRAM(s) Oral every 12 hours  artificial tears (preservative free) Ophthalmic Solution 1 Drop(s) Both EYES every 4 hours  atorvastatin 10 milliGRAM(s) Oral at bedtime  aztreonam  IVPB 2000 milliGRAM(s) IV Intermittent <User Schedule>  chlorhexidine 0.12% Liquid 15 milliLiter(s) Oral Mucosa every 12 hours  chlorhexidine 2% Cloths 1 Application(s) Topical daily  dextrose 5%. 1000 milliLiter(s) (100 mL/Hr) IV Continuous <Continuous>  dextrose 5%. 1000 milliLiter(s) (50 mL/Hr) IV Continuous <Continuous>  dextrose 50% Injectable 25 Gram(s) IV Push once  dextrose 50% Injectable 25 Gram(s) IV Push once  dextrose 50% Injectable 25 Gram(s) IV Push once  dextrose 50% Injectable 12.5 Gram(s) IV Push once  droxidopa 200 milliGRAM(s) Oral <User Schedule>  droxidopa 400 milliGRAM(s) Oral every 8 hours  epoetin odalis (EPOGEN) Injectable 44859 Unit(s) IV Push <User Schedule>  ferrous    sulfate Liquid 300 milliGRAM(s) Oral daily  glucagon  Injectable 1 milliGRAM(s) IntraMuscular once  insulin lispro (ADMELOG) corrective regimen sliding scale   SubCutaneous every 6 hours  insulin NPH human recombinant 6 Unit(s) SubCutaneous every 6 hours  levETIRAcetam  Solution 1000 milliGRAM(s) Oral daily  levETIRAcetam  Solution 250 milliGRAM(s) Oral <User Schedule>  midodrine. 30 milliGRAM(s) Oral every 8 hours  norepinephrine Infusion 0.05 MICROgram(s)/kG/Min (6.23 mL/Hr) IV Continuous <Continuous>  pantoprazole  Injectable 40 milliGRAM(s) IV Push two times a day  petrolatum Ophthalmic Ointment 1 Application(s) Both EYES four times a day  sodium chloride 1 Gram(s) Oral two times a day    MEDICATIONS  (PRN):  acetaminophen   Oral Liquid .. 650 milliGRAM(s) Oral every 6 hours PRN Temp greater or equal to 38C (100.4F), Mild Pain (1 - 3)  dextrose Oral Gel 15 Gram(s) Oral once PRN Blood Glucose LESS THAN 70 milliGRAM(s)/deciliter  diphenhydrAMINE Elixir 50 milliGRAM(s) Oral once PRN Rash and/or Itching  sodium chloride 0.9% Bolus. 250 milliLiter(s) IV Bolus every 5 minutes PRN SBP LESS THAN or EQUAL to 90 mmHg  sodium chloride 0.9% lock flush 10 milliLiter(s) IV Push every 1 hour PRN Pre/post blood products, medications, blood draw, and to maintain line patency       Vitals:  Vital Signs Last 24 Hrs  T(C): 37.1 (24 Oct 2023 20:00), Max: 37.6 (24 Oct 2023 00:00)  T(F): 98.8 (24 Oct 2023 20:00), Max: 99.6 (24 Oct 2023 00:00)  HR: 119 (24 Oct 2023 21:08) (105 - 135)  BP: 119/34 (24 Oct 2023 21:08) (55/34 - 125/107)  BP(mean): 56 (24 Oct 2023 21:08) (42 - 114)  RR: 24 (24 Oct 2023 21:08) (0 - 24)  SpO2: 100% (24 Oct 2023 21:08) (92% - 100%)    Parameters below as of 24 Oct 2023 20:00  Patient On (Oxygen Delivery Method): ventilator              Neurological Exam:  Mental Status: Eyes closed, off sedation. Does not follow commands,  + trach to vent and NGT   Cranial Nerves: PERRL slightly sluggish, L subconjunctival hemorrhage improved  Motor: Moves upper extremities spontaneously R >L, tremor R > L  no movement noted in lowers  Sensation: WD to noxious x4    I personally reviewed the below data/images/labs:       LABS:                          8.6    16.76 )-----------( 467      ( 24 Oct 2023 07:13 )             28.0     10-24    138  |  102  |  32<H>  ----------------------------<  150<H>  4.3   |  25  |  1.75<H>    Ca    8.6      24 Oct 2023 00:05  Phos  4.1     10-24  Mg     2.10     10-24    TPro  5.3<L>  /  Alb  1.9<L>  /  TBili  <0.2  /  DBili  x   /  AST  17  /  ALT  7   /  AlkPhos  242<H>  10-24    LIVER FUNCTIONS - ( 24 Oct 2023 00:05 )  Alb: 1.9 g/dL / Pro: 5.3 g/dL / ALK PHOS: 242 U/L / ALT: 7 U/L / AST: 17 U/L / GGT: x           PTT - ( 24 Oct 2023 07:13 )  PTT:73.4 sec  Urinalysis Basic - ( 24 Oct 2023 00:05 )    Color: x / Appearance: x / SG: x / pH: x  Gluc: 150 mg/dL / Ketone: x  / Bili: x / Urobili: x   Blood: x / Protein: x / Nitrite: x   Leuk Esterase: x / RBC: x / WBC x   Sq Epi: x / Non Sq Epi: x / Bacteria: x        < from: CT Head No Cont (08.15.23 @ 17:20) >    ACC: [id number] EXAM:  CT BRAIN   ORDERED BY: MINAL SAM     PROCEDURE DATE:  08/15/2023          INTERPRETATION:  Clinical indication: Change in neuro exam.    Multiple axial sections were performed from base to vertex without   contrast enhancement. Coronal and sagittal reconstructions were   reformatted well.    This exam is compared prior head CT performed on August 11, 2023    Parenchymal volume loss and chronic microvessel ischemic changes are   again seen.    Abnormal low-attenuation involving the left occipital cortical   subcortical region is again seen. 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    No evidence of acute hemorrhage mass or mass effect is seen.    Evaluation of the osseous structures with appropriate window demonstrates   sclerotic changes about the left mastoid region which appears stable.   Opacification left middle ear region is again seen.    Patient is status post bilateral cataract surgery.    Impression: Stable exam.    < end of copied text >    vEEG:    Clinical Impression:  - Severe diffuse non-specific cerebral dysfunction  - There were no epileptiform abnormalities or seizures recorded.        CTH 8/11:    VENTRICLES AND SULCI: Age-appropriate involutional change  INTRA-AXIAL:  Old left PCA infarct as seen on the prior unchanged.   Microvascular ischemic changes involving the periventricular and   subcortical white matter as seen previously  EXTRA-AXIAL:  No mass or collection is seen.  VISUALIZED SINUSES:  Clear.  VISUALIZED MASTOIDS: Left mastoid sclerosis  CALVARIUM: Infiltrative appearance tothe calvarium may be indicative of   marrow infiltration on the basis of patient's known diagnosis of breast   cancer. MISCELLANEOUS:  None.    IMPRESSION:  No significant interval change compared with 7/17/2023 in   left PCA infarct which is old. Microvascular ischemic changes involving   the periventricular and subcortical white matter as seen   previously.Questionable lesions at the level of the calvarium related to   possible breast CA. Clinical correlation recommended.    --- End of Report ---      ct< from: CT Head No Cont (09.26.23 @ 21:02) >  IMPRESSION: Vague questionable areas of hypoattenuation within the   bilateral cerebellar hemispheres which may be compatible with   acute/subacute ischemia. Recommend further evaluation with a brain MRI   study, provided there are no MRI contraindications.    No acute intracranial hemorrhage.    Previously seen questionable vague wedge-shaped area of hypoattenuation   in the left parietal lobe with artifactual secondary to motion and volume   averaging with a prominent sulcus.    Chronic left occipital lobe infarct.    < end of copied text >    < from: CT Head No Cont (10.03.23 @ 20:46) >    IMPRESSION:    No acute intracranial hemorrhage or mass effect. Evaluation of the   posterior fossa degraded by streak artifact from dental amalgam.   Lucencies in the bilateral cerebellum may be artifactual.    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    Bilateral middle ear and mastoid effusions which are also seen on prior   exam.    EEG Classification / Summary:  Abnormal EEG in the awake, drowsy states.   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   -No seizures

## 2023-10-24 NOTE — PROGRESS NOTE ADULT - SUBJECTIVE AND OBJECTIVE BOX
Subjective: Patient seen and examined. No new events except as noted.   remains in ICU   started on levo gtt.      REVIEW OF SYSTEMS:  Unable to obtain    MEDICATIONS:  MEDICATIONS  (STANDING):  albuterol    0.083% 2.5 milliGRAM(s) Nebulizer every 6 hours  artificial tears (preservative free) Ophthalmic Solution 1 Drop(s) Both EYES every 4 hours  atorvastatin 10 milliGRAM(s) Oral at bedtime  aztreonam  IVPB 2000 milliGRAM(s) IV Intermittent <User Schedule>  chlorhexidine 0.12% Liquid 15 milliLiter(s) Oral Mucosa every 12 hours  chlorhexidine 2% Cloths 1 Application(s) Topical daily  dextrose 5%. 1000 milliLiter(s) (50 mL/Hr) IV Continuous <Continuous>  dextrose 5%. 1000 milliLiter(s) (100 mL/Hr) IV Continuous <Continuous>  dextrose 50% Injectable 12.5 Gram(s) IV Push once  dextrose 50% Injectable 25 Gram(s) IV Push once  dextrose 50% Injectable 25 Gram(s) IV Push once  dextrose 50% Injectable 25 Gram(s) IV Push once  droxidopa 200 milliGRAM(s) Oral every 8 hours  droxidopa 200 milliGRAM(s) Oral <User Schedule>  epoetin odalis (EPOGEN) Injectable 84306 Unit(s) IV Push <User Schedule>  ferrous    sulfate Liquid 300 milliGRAM(s) Oral daily  glucagon  Injectable 1 milliGRAM(s) IntraMuscular once  heparin  Infusion. 1000 Unit(s)/Hr (10 mL/Hr) IV Continuous <Continuous>  insulin lispro (ADMELOG) corrective regimen sliding scale   SubCutaneous every 6 hours  insulin NPH human recombinant 6 Unit(s) SubCutaneous every 6 hours  levETIRAcetam  Solution 1000 milliGRAM(s) Oral daily  levETIRAcetam  Solution 250 milliGRAM(s) Oral <User Schedule>  midodrine. 30 milliGRAM(s) Oral every 8 hours  norepinephrine Infusion 0.05 MICROgram(s)/kG/Min (6.23 mL/Hr) IV Continuous <Continuous>  pantoprazole  Injectable 40 milliGRAM(s) IV Push two times a day  petrolatum Ophthalmic Ointment 1 Application(s) Both EYES four times a day  sodium chloride 1 Gram(s) Oral two times a day      PHYSICAL EXAM:  T(C): 36.3 (10-24-23 @ 08:45), Max: 37.6 (10-24-23 @ 00:00)  HR: 108 (10-24-23 @ 09:00) (107 - 126)  BP: 118/68 (10-24-23 @ 09:00) (92/44 - 126/52)  RR: 24 (10-24-23 @ 09:00) (0 - 24)  SpO2: 100% (10-24-23 @ 09:00) (93% - 100%)  Wt(kg): --  I&O's Summary    23 Oct 2023 07:01  -  24 Oct 2023 07:00  --------------------------------------------------------  IN: 1360 mL / OUT: 0 mL / NET: 1360 mL            Appearance: NAD, +trach  HEENT: dry oral mucosa  Lymphatic: No lymphadenopathy  Cardiovascular: Normal S1 S2, No JVD, No murmurs, No edema  Respiratory: Decreased BS, + trach to vent	  Neuro: opens eyes  Gastrointestinal: Soft, Non-tender, + BS, + NGT  Skin: No rashes, No ecchymoses, No cyanosis	  Extremities: No strength/ROM 2/2 sedation, + BL LE edema  Vascular: Peripheral pulses palpable 2+ bilaterally  Anasarca   Mode: AC/ CMV (Assist Control/ Continuous Mandatory Ventilation), RR (machine): 24, FiO2: 35, PEEP: 8, ITime: 0.8, MAP: 19, PC: 35, PIP: 44          LABS:    CARDIAC MARKERS:                                8.6    16.76 )-----------( 467      ( 24 Oct 2023 07:13 )             28.0     10-24    138  |  102  |  32<H>  ----------------------------<  150<H>  4.3   |  25  |  1.75<H>    Ca    8.6      24 Oct 2023 00:05  Phos  4.1     10-24  Mg     2.10     10-24    TPro  5.3<L>  /  Alb  1.9<L>  /  TBili  <0.2  /  DBili  x   /  AST  17  /  ALT  7   /  AlkPhos  242<H>  10-24    proBNP:   Lipid Profile:   HgA1c:   TSH:             TELEMETRY: 	SR     ECG:  	  RADIOLOGY:   DIAGNOSTIC TESTING:  [ ] Echocardiogram:  [ ]  Catheterization:  [ ] Stress Test:    OTHER:

## 2023-10-24 NOTE — PROGRESS NOTE ADULT - ASSESSMENT
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       NEUROLOGY  #AMS  Multiple etiologies including active infection vs CVA.   - MR head performed w/ new infarct and old infarcts, EEG without seizure events but with high foci potential   - c/w ASA, lipitor  - c/w keppra      CARDIOVASCULAR  # Septic vs vasoplegic shock   Etiology likely septic iso active infection. Possible component of vasoplegic   - on midodrine and droxidopa  - on levo, capped at 1 pressor  - will trial additional droxidopa before dialysis   - will wean pressors with goal SBP >90    # HFpEF w/ decompensation   > ECHO w/ EF 59 with severe LVH and diastolic dysfunction   - fluid overloaded, HD to remove fluids     HEENT   # Left ear OE with acute on chronic left-sided otomastoiditis  - ENT evaluation (9/7) with no mastoid tenderness, however found with positive swelling and excoriation to left auricle and tenderness to manipulation of auricle. Debrided crusting of auricle and EAC evaluated with edema and unable to visualize TM. EAC debrided and found with watery exudate.   - CT IAC with acute on chronic left-sided otitis externa and acute on chronic left-sided otomastoiditis. Superimposed left-sided tympanosclerosis. 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   - HSV negative and Path (9/6) with normal appearing epithelial cells singly and in clusters devoid of viral cytopathic effect.     RESPIRATORY  # AHRF second to volume overload   - CKD vs HFpEF w/ hypercarbia 2/2 volume overload requiring intubation, trach, and HD initiation  - Continue on albuterol and chest PT  - Continue volume removal with HD      #tracheomalacia   - CT CHEST (9/8) with tracheomalacia, BL pleural effusions and continued consolidations     #mechanical ventilation   - changed vent to PC 24/35/8/40 with slight improvement in PIP (40s)     GI  # UGIB   - CGE x 1 episode on 8/24 and melena x 2 episodes on 8/31 likely residual blood.   - EGD (8/31) found with esophagitis and erosive gastropathy  - Continue on PPI BID    # Dysphagia   - NGT-TF continued   - Pending PEG however needs improvement prior to placement    RENAL  # Progressive CKD   - Presented volume overload and progressive RUSS on CKD   - HD MWF TIW with midodrine and droxidopa  - ICU for vasopressor assisted volume removal, cap to 1 pressor and not offering CRRT due to comorbidities and prognosis       INFECTIOUS DISEASE  # ESBL ECOLI and MSSA VAP c/b MSSA bacteremia   - hx of MSSA bacteremia, ESBL E. Coli sputum Cx, BAL w/ MSSA  - ECHO unable to rule out endocarditis   - treated with several rounds of antibiotics including vancomycin (c/b rashes), meropenem, and daptomycin.     # Proteus and  PSA VAP/ Trachitis   - Continued fevers and SCx (9/29) with MDR  PSA and Proteus   - Continued on Bradford as above however noted with resistance and changed to Zosyn (10/2 - 10/5) with course complicated by possible drug rxn. Zosyn changed to Aztreonam (10/5 - 10/6) however RPT SCx (10/3) with  PSA however only intermediate coverage to aztreonam and amikacin.  PSA grossly resistant to other antibiotics other than cephalosporins however patient with reactions in the past. Case discussed with ID and ID pharmacist and change to Polymyxin (10/6 - 10/15) however continues to spike fevers likely second to Polymyxin only intermediate coverage and Recarbio resistant .   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  - HH remained labile requiring xfusion (10/15) however noted with questionable reaction with rash and BB called. Work up being sent.   - Monitor HH     VASCULAR   # LLE POP DVT   - US BL LE (9/10) with acute left deep vein thrombosis of the left popliteal vein.  - RUE swollen and VA DOPPLER RUE (10/3) with R IJ DVT and R brachial DVT.  - Eliquis held for permacath however procedure held.   - Eliquis remains on hold second to mild suction trauma hemoptysis.   - Resume Eliquis when able    # HD access  - TRISHA TAYLOR (9/27 - 10/21)  - will place new central access prior to dialysis    ONC   # Hx of Breast CA   - Patient dx in 2018 and s/p BL mastectomy (radical on right) and chemo and RT.   - Supportive care.     ENDOCRINE  # IDDM2   - Continued on NPH with ISS, however noted with hypoglycemic episodes and NPH dc'ed.    - Finger sticks trending up off NPH.   - Continue on NPH 6U with moderate ISS.   - Adjust as need     SKIN  # Drug Eruption   - Attempted cefepime (8/12) vs ancef (8/13-8/14) and then noted with drug rxn in ICU. Continue on steroids with prednisone 40 (8/18 - 9/2) then prednisone 30 (9/3-9/7), then prednisone 20 (9/8 - 9/10), then prednisone 10mg (9/11-9/13) then turn off.   - Attempted Zosyn (10/2-10/5) with possible rash. Zosyn turned off with improvement.   - Hold further cephalosporins or PCNs at this time   - Monitor for further drug rxns.     ETHICS/ GOC    - Attempted palliative discussion however family not interested and wishes for FULL CODE  - Family continues to want everything done despite HD failure on multiple oral pressors    DISPO - TBD

## 2023-10-24 NOTE — PROGRESS NOTE ADULT - ASSESSMENT
73 f with DM, HTN, CKD, breast CA, latent TB (treated first with INH then had rash and switched to rifampin), MSSA bacteremia, c-diff, respiratory arrest and cardiac arrest (2018), s/p trach/PEG then removed, CVA with residual weakness, aspiration PNA, 1/4 sputums had MAC 7/2023, admitted 8/11 with respiratory failure no fever or WBC but was intubated and then spiked  blood cx negative, sputum cx with MSSA  developed rash and renal failure after cefepime, was on HD then discontinued became uremic and now again on HD  head MRI 8/17 with acute cerebellar infarct  had renal failure, AIN? family declined renal biopsy started on prednisone  s/p trach 9/1 and BAL with MSSA   ET cx with ESBL and MSSA  blood cx 9/1: MSSA with hypotension  repeat negative 9/4, 9/8, TTE no veg s/p 4 weeks of vanco/dapto through 10/2  L ear edema and otitis externa, s/p a long course of ana cristina, the last cx showed candida and ENT stated it could be fungal (saw black spores?) so was also given 7 days of fluconazole  pt had a rash again, unclear what caused it, fluconazole  still with intermittent fevers and then sputum cx with carbapenem resistant pseudomonas, s/p a course of polymixin  no improvement in mental status and ?seizure as well, neuro following  had drop in Hgb s/p transfusion and then had again febrile 10/15-10.17 with leukocytosis, rash and eosinophilia, sputum growing pseudomonas and proteus  (both sensitive to aztreonam) ?VAP vs a transfusion reaction  hypotension during HD,  transferred to MICU, now off pressors, shiley was removed in view of intermittent fever, now plan for new shiley, c-diff and GI PCR negative     * aztreonam renally dosed, will likely complete a 10 day course, started 10/17, now day 8  * monitor CBC/diff and CMP  * please call with questions        The above assessment and plan was discussed with IVORY Conner MD  contact on teams  After 5pm and on weekends call [phone]

## 2023-10-24 NOTE — PROGRESS NOTE ADULT - ASSESSMENT
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 While in MICU patient noted with worsening renal failure requiring HD initiation, CGE/ Melena s/p EGD 8/31 found with esophagitis and erosive gastropathy, new right cerebellar infarct and fevers second to ESBL COLI and MSSA VAP, MSSA bacteremia, left ear otitis externa and drug rxn to cephalosporins Course further complicated by prolonged vent time s/p tracheostomy 9/1 and transferred to RCU 9/3 completed by recurrent fevers with high PIP, respiratory acidosis and concern for volume overloaded.   CTH repeated 9/26 for concern for encephalopathy - has known now - chronic bilateral cerebellar infarcts, L PCA infarct. L parietal hypodensity seen on prior CT likely artifactual  Repeat CTH 10/3 - unchanged  EEG 10/5 with L posterocentral/temporal spikes/sharp waves - doubt true focal seizure upon further review of EEG     Impression:   ? Focal seizure - has hx of bilateral R > L tremors, now on Keppra  Metabolic encephalopathy related to shock, infection, doubt new stroke  L PCA stroke, ESUS s/p ILR, also with bilateral centrum semiovale watershed infarcts   Multiple embolic strokes on MRI 8/17 - R cerebellum, midbrain, multiple other tiny embolic infarcts.   Dementia   MSSA bacteremia, r/o endocarditis s/p Daptomycin  Acute popliteal DVT on Eliquis   Anemia     Recommendations   [] care per MICU for persistent hypotension  [] has multiple areas of epileptogenic potential on EEG, no clear seizure correlate seen. On Keppra but no improvement in mental status  [] consider MRI brain once stable but doubt will change mgmt  [] mgmt of hypotension per piumary team, remains full code  [] Keppra 500mg BID with extra 250mg after HD on HD days - will consider stopping to see if MS improves  [] Abx per ID  [] C/w home Atorvastatin 10 mg qhs   [] continue to address GOC with family, poor prognosis -attempted to discuss with  again today but still wants everything done    Katty Charles,   Vascular Neurology  Office [phone]

## 2023-10-24 NOTE — PROGRESS NOTE ADULT - ASSESSMENT
IMPRESSION: 73F w/ HTN, DM2, CVA, breast CA-bilateral mastectomy, recurrent aspiration pneumonia/respiratory failure, and CKD, 8/11/23 p/w acute hypercapnic respiratory failure; c/b RUSS    (1)Renal - RUSS on CKD4 ==>now newly ESRD. Last dialyzed Saturday, on HD now     (2)Hyponatremia - improved/stable    (3)CV- tenuous hemodynamics such with each passing week we have more difficulty performing HD/achieving UF, persistent issue.     (4)ID- BCx from 10/14/23 NGTD, BC (10/17) NGTD on IV abx     (5)Pulm- trach/vent-dependent    (6)Anemia- hgb low, epo with HD    RECOMMEND:  (1)HD today; pressors and sodium modelling as needed to keep SBP>90; Epogen with HD   (2)Dose new meds for GFR <10/HD.     Nilda Espinoza, HEAVEN  [facility]  [phone]          IMPRESSION: 73F w/ HTN, DM2, CVA, breast CA-bilateral mastectomy, recurrent aspiration pneumonia/respiratory failure, and CKD, 8/11/23 p/w acute hypercapnic respiratory failure; c/b RUSS    (1)Renal - RUSS on CKD4 ==>now newly ESRD. Last dialyzed Saturday, on HD now     (2)Hyponatremia - improved/stable    (3)CV- tenuous hemodynamics such with each passing week we have more difficulty performing HD/achieving UF, persistent issue.     (4)ID- BCx from 10/14/23 NGTD, BC (10/17) NGTD on IV abx     (5)Pulm- trach/vent-dependent    (6)Anemia- hgb low, epo with HD    RECOMMEND:  (1)HD today; pressors and sodium modelling as needed to keep SBP>90; Epogen with HD   (2)Dose new meds for GFR <10/HD.     Nilda Espinoza DNP  [facility]  [phone]     RENAL ATTENDING NOTE  Patient seen and examined with NP. Agree with assessment and plan as above.    Jimmy Colon MD  [facility]  [phone]

## 2023-10-24 NOTE — CHART NOTE - NSCHARTNOTEFT_GEN_A_CORE
NUTRITION FOLLOW-UP      73 y.o. DM, HTN, CKD, CHFpEF, breast Ca, respiratory and cardiac arrest (2018), CVA w residual weakness s/p trach/PEG which has since been removed.  Presenting with respiratory distress requiring intubation.   Pt. with acute hypoxic respiratory failure with hypoxia and hypercapnia which is multifactorial including aspiration and acute on chronic diastolic CHF.  Also with tracheomalacia, s/p tracheostomy with course c/b bacteremia, L otitis externa (9/5) s/p ENT debridement x2 & RUSS/CKD now ESRD requiring HD.  However, Pt. not consistently tolerating HD & deemed not a candidate for CRRT.  S/p HD, today (10/24). Repeated MICU stays for hypotension and multiorgan failure.  Per chart, palliative discussion attempted, although family not interested.      Continues to receive enteral nutrition via NGT.  Pending PEG, should medical condition improves.  Pt. observed receiving Glucerna 1.5 @ prescribed goal of 40mL/hr.  Pt. without any noted/reported intolerance to TF @ this time (no vomiting/constipation or abdominal distention).  Liquid BM (10/23).    Overall, significant weight increase.  Possibly somewhat fluid related considering severe edema (3+ generalized).  Current enteral regimen meets estimated KCal/protein needs and remains appropriate @ this time.        _________________Diet___________________  Diet, NPO with Tube Feed:   Tube Feeding Modality: Nasogastric  Glucerna 1.5 Judd (GLUCERNA1.5RTH)  Total Volume for 24 Hours (mL): 960  Continuous  Starting Tube Feed Rate {mL per Hour}: 10  Increase Tube Feed Rate by (mL): 10     Every 4 hours  Until Goal Tube Feed Rate (mL per Hour): 40  Tube Feed Duration (in Hours): 24  Tube Feed Start Time: 17:30  No Carb Prosource (1pkg = 15gms Protein)     Qty per Day:  1 (10-20-23 @ 18:51) [Active]    provides:  960mL total volume  1440 kcals  79g protein (+15 g additional protein from NoCarb Prosource)= 94g total protein   729mL free water       Weight:                    Height:   61"                Upper 10% Ideal Body Weight:  115.5lbs / 52.5kg   75.8kg (10/24)  78.2kg (10/19)  78.5kg (10/2)  83.2kg (9/24)  67.8kg (9/10)  58.0kg (8/31)  66.5kg (8/11 on admit)      _____Estimated Energy Needs (based upper 10% Ideal Body Weight)_____  25-30 kcals/kg = 1423-3528 kcals/d  1.5-1.8.g protein/kg = 80-95g protein/d    Edema: 3+ generalized  Skin: No noted pressure injuries        ________________________Pertinent Medications____________   MEDICATIONS  (STANDING):  albuterol    0.083% 2.5 milliGRAM(s) Nebulizer every 6 hours  apixaban 5 milliGRAM(s) Oral every 12 hours  artificial tears (preservative free) Ophthalmic Solution 1 Drop(s) Both EYES every 4 hours  atorvastatin 10 milliGRAM(s) Oral at bedtime  aztreonam  IVPB 2000 milliGRAM(s) IV Intermittent <User Schedule>  chlorhexidine 0.12% Liquid 15 milliLiter(s) Oral Mucosa every 12 hours  chlorhexidine 2% Cloths 1 Application(s) Topical daily  dextrose 5%. 1000 milliLiter(s) (50 mL/Hr) IV Continuous <Continuous>  dextrose 5%. 1000 milliLiter(s) (100 mL/Hr) IV Continuous <Continuous>  dextrose 50% Injectable 12.5 Gram(s) IV Push once  dextrose 50% Injectable 25 Gram(s) IV Push once  dextrose 50% Injectable 25 Gram(s) IV Push once  dextrose 50% Injectable 25 Gram(s) IV Push once  droxidopa 200 milliGRAM(s) Oral <User Schedule>  droxidopa 400 milliGRAM(s) Oral every 8 hours  epoetin odalis (EPOGEN) Injectable 25905 Unit(s) IV Push <User Schedule>  ferrous    sulfate Liquid 300 milliGRAM(s) Oral daily  glucagon  Injectable 1 milliGRAM(s) IntraMuscular once  insulin lispro (ADMELOG) corrective regimen sliding scale   SubCutaneous every 6 hours  insulin NPH human recombinant 6 Unit(s) SubCutaneous every 6 hours  levETIRAcetam  Solution 1000 milliGRAM(s) Oral daily  levETIRAcetam  Solution 250 milliGRAM(s) Oral <User Schedule>  midodrine. 30 milliGRAM(s) Oral every 8 hours  norepinephrine Infusion 0.05 MICROgram(s)/kG/Min (6.23 mL/Hr) IV Continuous <Continuous>  pantoprazole  Injectable 40 milliGRAM(s) IV Push two times a day  petrolatum Ophthalmic Ointment 1 Application(s) Both EYES four times a day  sodium chloride 1 Gram(s) Oral two times a day    MEDICATIONS  (PRN):  acetaminophen   Oral Liquid .. 650 milliGRAM(s) Oral every 6 hours PRN Temp greater or equal to 38C (100.4F), Mild Pain (1 - 3)  dextrose Oral Gel 15 Gram(s) Oral once PRN Blood Glucose LESS THAN 70 milliGRAM(s)/deciliter  diphenhydrAMINE Elixir 50 milliGRAM(s) Oral once PRN Rash and/or Itching  sodium chloride 0.9% Bolus. 250 milliLiter(s) IV Bolus every 5 minutes PRN SBP LESS THAN or EQUAL to 90 mmHg  sodium chloride 0.9% lock flush 10 milliLiter(s) IV Push every 1 hour PRN Pre/post blood products, medications, blood draw, and to maintain line patency          _________________________Pertinent Labs____________________     10-24    138  |  102  |  32<H>  ----------------------------<  150<H>  4.3   |  25  |  1.75<H>    Ca    8.6      24 Oct 2023 00:05  Phos  4.1     10-24  Mg     2.10     10-24    TPro  5.3<L>  /  Alb  1.9<L>  /  TBili  <0.2  /  DBili  x   /  AST  17  /  ALT  7   /  AlkPhos  242<H>  10-24                                                                   8.6    16.76 )-----------( 467      ( 24 Oct 2023 07:13 )             28.0         CAPILLARY BLOOD GLUCOSE      POCT Blood Glucose.: 133 mg/dL (24 Oct 2023 12:53)    POCT Blood Glucose.: 133 mg/dL (10-24-23 @ 12:53)  POCT Blood Glucose.: 129 mg/dL (10-24-23 @ 05:09)  POCT Blood Glucose.: 160 mg/dL (10-23-23 @ 22:42)  POCT Blood Glucose.: 149 mg/dL (10-23-23 @ 18:09)      ________NUTRITION Dx_________    Pt. remains severely malnourished         PLAN/RECOMMENDATIONS:    1) Continue current enteral regimen, as tolerated.  2) Obtain daily & pre/post HD weights  3) Monitor tolerance to TF, GI status, electrolytes, glucose     RDN remains available and will f/u PRN.          Jaylene Ybarra RDN, CDN       pager [phone] or MS Teams

## 2023-10-25 NOTE — PROGRESS NOTE ADULT - ASSESSMENT
IMPRESSION: 73F w/ HTN, DM2, CVA, breast CA-bilateral mastectomy, recurrent aspiration pneumonia/respiratory failure, and CKD, 8/11/23 p/w acute hypercapnic respiratory failure; c/b RUSS    (1)Renal - RUSS on CKD4 ==>now newly ESRD. S/p HD yesterday    (2)Hyponatremia - improved/stable    (3)CV- tenuous hemodynamics such with each passing week we have more difficulty performing HD/achieving UF, persistent issue.     (4)ID- BCx from 10/14/23 NGTD, BC (10/17) NGTD on IV abx     (5)Pulm- trach/vent-dependent    (6)Anemia- hgb low, epo with HD    RECOMMEND:  (1)Next HD tomorrow; pressors and sodium modelling as needed to keep SBP>90; Epogen with HD   (2)Dose new meds for GFR <10/HD.     Nilda Espinoza, HEAVEN  [facility]  [phone]

## 2023-10-25 NOTE — PROGRESS NOTE ADULT - SUBJECTIVE AND OBJECTIVE BOX
Subjective: Patient seen and examined. No new events except as noted.   remains in ICU     REVIEW OF SYSTEMS:    Unable to obtain    MEDICATIONS:  MEDICATIONS  (STANDING):  albuterol    0.083% 2.5 milliGRAM(s) Nebulizer every 6 hours  apixaban 5 milliGRAM(s) Oral every 12 hours  artificial tears (preservative free) Ophthalmic Solution 1 Drop(s) Both EYES every 4 hours  atorvastatin 10 milliGRAM(s) Oral at bedtime  aztreonam  IVPB 2000 milliGRAM(s) IV Intermittent <User Schedule>  chlorhexidine 0.12% Liquid 15 milliLiter(s) Oral Mucosa every 12 hours  chlorhexidine 2% Cloths 1 Application(s) Topical daily  dextrose 5%. 1000 milliLiter(s) (50 mL/Hr) IV Continuous <Continuous>  dextrose 5%. 1000 milliLiter(s) (100 mL/Hr) IV Continuous <Continuous>  dextrose 50% Injectable 12.5 Gram(s) IV Push once  dextrose 50% Injectable 25 Gram(s) IV Push once  dextrose 50% Injectable 25 Gram(s) IV Push once  dextrose 50% Injectable 25 Gram(s) IV Push once  droxidopa 600 milliGRAM(s) Oral every 8 hours  epoetin odalis (EPOGEN) Injectable 34760 Unit(s) IV Push <User Schedule>  ferrous    sulfate Liquid 300 milliGRAM(s) Oral daily  glucagon  Injectable 1 milliGRAM(s) IntraMuscular once  insulin lispro (ADMELOG) corrective regimen sliding scale   SubCutaneous every 6 hours  insulin NPH human recombinant 6 Unit(s) SubCutaneous every 6 hours  levETIRAcetam  Solution 1000 milliGRAM(s) Oral daily  levETIRAcetam  Solution 250 milliGRAM(s) Oral <User Schedule>  midodrine. 40 milliGRAM(s) Oral every 8 hours  norepinephrine Infusion 0.05 MICROgram(s)/kG/Min (6.23 mL/Hr) IV Continuous <Continuous>  pantoprazole  Injectable 40 milliGRAM(s) IV Push two times a day  petrolatum Ophthalmic Ointment 1 Application(s) Both EYES four times a day  sodium chloride 1 Gram(s) Oral two times a day      PHYSICAL EXAM:  T(C): 37.3 (10-25-23 @ 08:00), Max: 37.3 (10-25-23 @ 08:00)  HR: 109 (10-25-23 @ 09:41) (109 - 135)  BP: 107/45 (10-25-23 @ 09:00) (86/44 - 139/56)  RR: 24 (10-25-23 @ 09:00) (17 - 24)  SpO2: 100% (10-25-23 @ 09:41) (92% - 100%)  Wt(kg): --  I&O's Summary    24 Oct 2023 07:01  -  25 Oct 2023 07:00  --------------------------------------------------------  IN: 1585 mL / OUT: 1034 mL / NET: 551 mL    25 Oct 2023 07:01  -  25 Oct 2023 11:02  --------------------------------------------------------  IN: 146.1 mL / OUT: 0 mL / NET: 146.1 mL          Appearance: NAD, +trach  HEENT: dry oral mucosa  Lymphatic: No lymphadenopathy  Cardiovascular: Normal S1 S2, No JVD, No murmurs, No edema  Respiratory: Decreased BS, + trach to vent	  Neuro: opens eyes  Gastrointestinal: Soft, Non-tender, + BS, + NGT  Skin: No rashes, No ecchymoses, No cyanosis	  Extremities: No strength/ROM 2/2 sedation, + BL LE edema  Vascular: Peripheral pulses palpable 2+ bilaterally  Anasarca   Mode: AC/ CMV (Assist Control/ Continuous Mandatory Ventilation), RR (machine): 24, FiO2: 35, PEEP: 8, ITime: 0.8, MAP: 19, PC: 35, PIP: 44            LABS:    CARDIAC MARKERS:                                9.0    19.33 )-----------( 437      ( 25 Oct 2023 03:30 )             29.0     10-25    136  |  101  |  26<H>  ----------------------------<  127<H>  4.1   |  25  |  1.29    Ca    8.8      25 Oct 2023 03:30  Phos  4.1     10-24  Mg     2.10     10-25    TPro  6.0  /  Alb  1.9<L>  /  TBili  <0.2  /  DBili  x   /  AST  17  /  ALT  10  /  AlkPhos  256<H>  10-25          TELEMETRY: 	SR    ECG:  	  RADIOLOGY:   DIAGNOSTIC TESTING:  [ ] Echocardiogram:  [ ]  Catheterization:  [ ] Stress Test:    OTHER:

## 2023-10-25 NOTE — PROGRESS NOTE ADULT - ASSESSMENT
74 y/o F well known to me from my [facility]. she was admitted at [facility] 7/12-7/22 w aspiration PNA, was treated w CEFEPIME, developed an allergic rash,  dCHF, + MAC on AFB culture, had been progressively getting more and more lethargic and dyspneic at home since DC.   In  am of 8/11/23  ptn presented with respiratory distress w hypoxia and hypercarbia requiring intubation 2/2 volume overload +/- Asp PNA      Neuro   not responsive  Baseline MS AOx3, aphasic   - h/o CVA , on aspirin and statin . resumed w feeding tube, ASA resumed 8/26  - eeg  2/2 tremors, no sz focus, on KEPPRA  - less responsive in the past few days  - MRI 8/17:  new R cerebellar infarct, old left PCA/Occipital infarct. probably embolic in nature, did not tolerate full AC in the past, STEPHANIE is neg , no shunts observed  - ptn is poorly reactive, rpt scan done, no further CVA seen.     Cardiac   cardiology following  CHFpEF   TTE 7/2023 with EF 59%, with severe LVH and diastolic dysfunction   on Levo drip 2/2 hypotension and Midodrine and Droxidopa    Pulmonary   Acute hypercapnic and hypoxemic respiratory failure   prolonged intubation, now  trache to  vent, has copious secretions      Renal   on HD via L IJ Shiley, tunneled catheter when clinically stable  HD MWF, midodrine assisted, PUF in between HD days, unable to remove proper volume 2/2 hypotension.   permacath when clinically stable      GI  NPO  on tube feeds  coffee ground emesis x 1 8/24, melena overnight 8/31, s/p 1UPRBC, EGD/Colonoscopy: erosive gastropathy, esophagisits, on colonoscopy old blood seen no active bleeding, poor prep  family to decide re PEG placement    Endocrine  on Insulin: NPH, Admelog    ID   complicated infectious hospital course  treated for MSSA bacteremia, aspiration PNA.  sputum + for pseudomonas, ptn was initially on zosyn but was allergic, then was switched to aztreonam   Aztreonam was switched to polymyxin for a possible allergy but ptn didnt have a reaction to aztreonam, she had a reaction to Zosyn.   spike temps again while off Abx, Aztreonam resumed 10/17, will complete a 10 day course 10/26      ID course complicated with multiple ABx allergies  also treated for Left O. 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 and she remains full code at present

## 2023-10-25 NOTE — PROGRESS NOTE ADULT - SUBJECTIVE AND OBJECTIVE BOX
Progress Note    08-11-23 (73d)    Patient is a 73y old  Female who presents with a chief complaint of Respiratory distress (24 Oct 2023 21:42)      Subjective / Overnight Events :  - No acute events overnight.  - Pt seen and examined at bedside.     Additional ROS (if any):    MEDICATIONS  (STANDING):  albuterol    0.083% 2.5 milliGRAM(s) Nebulizer every 6 hours  apixaban 5 milliGRAM(s) Oral every 12 hours  artificial tears (preservative free) Ophthalmic Solution 1 Drop(s) Both EYES every 4 hours  atorvastatin 10 milliGRAM(s) Oral at bedtime  aztreonam  IVPB 2000 milliGRAM(s) IV Intermittent <User Schedule>  chlorhexidine 0.12% Liquid 15 milliLiter(s) Oral Mucosa every 12 hours  chlorhexidine 2% Cloths 1 Application(s) Topical daily  dextrose 5%. 1000 milliLiter(s) (50 mL/Hr) IV Continuous <Continuous>  dextrose 5%. 1000 milliLiter(s) (100 mL/Hr) IV Continuous <Continuous>  dextrose 50% Injectable 12.5 Gram(s) IV Push once  dextrose 50% Injectable 25 Gram(s) IV Push once  dextrose 50% Injectable 25 Gram(s) IV Push once  dextrose 50% Injectable 25 Gram(s) IV Push once  droxidopa 200 milliGRAM(s) Oral <User Schedule>  droxidopa 400 milliGRAM(s) Oral every 8 hours  epoetin odalis (EPOGEN) Injectable 16040 Unit(s) IV Push <User Schedule>  ferrous    sulfate Liquid 300 milliGRAM(s) Oral daily  glucagon  Injectable 1 milliGRAM(s) IntraMuscular once  insulin lispro (ADMELOG) corrective regimen sliding scale   SubCutaneous every 6 hours  insulin NPH human recombinant 6 Unit(s) SubCutaneous every 6 hours  levETIRAcetam  Solution 1000 milliGRAM(s) Oral daily  levETIRAcetam  Solution 250 milliGRAM(s) Oral <User Schedule>  midodrine. 30 milliGRAM(s) Oral every 8 hours  norepinephrine Infusion 0.05 MICROgram(s)/kG/Min (6.23 mL/Hr) IV Continuous <Continuous>  pantoprazole  Injectable 40 milliGRAM(s) IV Push two times a day  petrolatum Ophthalmic Ointment 1 Application(s) Both EYES four times a day  sodium chloride 1 Gram(s) Oral two times a day    MEDICATIONS  (PRN):  acetaminophen   Oral Liquid .. 650 milliGRAM(s) Oral every 6 hours PRN Temp greater or equal to 38C (100.4F), Mild Pain (1 - 3)  dextrose Oral Gel 15 Gram(s) Oral once PRN Blood Glucose LESS THAN 70 milliGRAM(s)/deciliter  diphenhydrAMINE Elixir 50 milliGRAM(s) Oral once PRN Rash and/or Itching  sodium chloride 0.9% Bolus. 250 milliLiter(s) IV Bolus every 5 minutes PRN SBP LESS THAN or EQUAL to 90 mmHg  sodium chloride 0.9% lock flush 10 milliLiter(s) IV Push every 1 hour PRN Pre/post blood products, medications, blood draw, and to maintain line patency          PHYSICAL EXAM:  Vital Signs Last 24 Hrs  T(C): 37.1 (25 Oct 2023 03:00), Max: 37.1 (24 Oct 2023 20:00)  T(F): 98.8 (25 Oct 2023 03:00), Max: 98.8 (24 Oct 2023 20:00)  HR: 115 (25 Oct 2023 06:30) (105 - 135)  BP: 139/56 (25 Oct 2023 06:30) (55/34 - 139/56)  BP(mean): 80 (25 Oct 2023 06:30) (42 - 94)  RR: 21 (25 Oct 2023 06:30) (17 - 24)  SpO2: 100% (25 Oct 2023 06:30) (92% - 100%)    Parameters below as of 25 Oct 2023 04:15  Patient On (Oxygen Delivery Method): ventilator        I&O's Summary    24 Oct 2023 07:01  -  25 Oct 2023 07:00  --------------------------------------------------------  IN: 1585 mL / OUT: 1034 mL / NET: 551 mL        General: NAD, non-toxic appearing   HEENT: PERRLA, EOMi, no scleral icterus  CV: RRR, normal S1 and S2, no m/r/g  Lungs: normal respiratory effort. CTAB, no wheezes, rales, or rhonchi  Abd: soft, nontender, nondistended  Ext: no edema, 2+ peripheral pulses   Pysch: AAOx3, appropriate affect   Neuro: grossly non-focal, moving all extremities spontaneously   Skin: no rashes or lesions     LABS:  CAPILLARY BLOOD GLUCOSE      POCT Blood Glucose.: 94 mg/dL (25 Oct 2023 05:24)  POCT Blood Glucose.: 171 mg/dL (24 Oct 2023 23:44)  POCT Blood Glucose.: 176 mg/dL (24 Oct 2023 17:38)  POCT Blood Glucose.: 133 mg/dL (24 Oct 2023 12:53)                              9.0    19.33 )-----------( 437      ( 25 Oct 2023 03:30 )             29.0       WBC Trend: 19.33<--, 16.76<--, 13.88<--  Hb Trend: 9.0<--, 8.6<--, 8.0<--, 8.1<--, 8.5<--    10-25    136  |  101  |  26<H>  ----------------------------<  127<H>  4.1   |  25  |  1.29    Ca    8.8      25 Oct 2023 03:30  Phos  4.1     10-24  Mg     2.10     10-25    TPro  6.0  /  Alb  1.9<L>  /  TBili  <0.2  /  DBili  x   /  AST  17  /  ALT  10  /  AlkPhos  256<H>  10-25    PTT - ( 24 Oct 2023 07:13 )  PTT:73.4 sec      Urinalysis Basic - ( 25 Oct 2023 03:30 )    Color: x / Appearance: x / SG: x / pH: x  Gluc: 127 mg/dL / Ketone: x  / Bili: x / Urobili: x   Blood: x / Protein: x / Nitrite: x   Leuk Esterase: x / RBC: x / WBC x   Sq Epi: x / Non Sq Epi: x / Bacteria: x            RADIOLOGY & ADDITIONAL TESTS: Reviewed

## 2023-10-25 NOTE — PROGRESS NOTE ADULT - ASSESSMENT
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       NEUROLOGY  #AMS  Multiple etiologies including active infection vs CVA.   - MR head performed w/ new infarct and old infarcts, EEG without seizure events but with high foci potential   - c/w ASA, lipitor  - c/w keppra      CARDIOVASCULAR  # Septic vs vasoplegic shock   Etiology likely septic iso active infection. 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   - resolved    RESPIRATORY  # AHRF second to volume overload   - CKD vs HFpEF w/ hypercarbia 2/2 volume overload requiring intubation, trach, and HD initiation  - Continue on albuterol and chest PT  - Continue volume removal with HD      #tracheomalacia   - CT CHEST (9/8) with tracheomalacia, BL pleural effusions and continued consolidations     #mechanical ventilation   - PC 24/35/8/40     GI  # UGIB   - Continue on PPI BID    # Dysphagia   - NGT-TF     RENAL  # ESRD  - HD MWF TIW with midodrine and droxidopa  - ICU for vasopressor assisted volume removal, cap to 1 pressor and not offering CRRT due to comorbidities and prognosis   - will titrate droxidopa to maintain off pressors during dialysis       INFECTIOUS DISEASE  # ESBL ECOLI and MSSA VAP c/b MSSA bacteremia   - hx of MSSA bacteremia, ESBL E. Coli sputum Cx, BAL w/ MSSA  - treated with abx     # Proteus and  PSA VAP/ Trachitis   - Overall difficulty with ABX tailoring given allergic rxns and resistant organisms  - aztreonam day 9, f/u ID recs     # MAC   - outpatient treatment    HEME  # Anemia second to GIB vs renal disease   - received transfusions during stay  - management as per renal, PPI    VASCULAR   # LLE POP DVT   - on Eliquis    # HD access  - TRISHA TAYLOR (9/27 - 10/21)  - TRISHA taylor replaced for HD     ONC   # Hx of Breast CA   - Patient dx in 2018 and s/p BL mastectomy (radical on right) and chemo and RT.     ENDOCRINE  # IDDM2   - Continued on NPH with ISS, however noted with hypoglycemic episodes and NPH dc'ed.    - Finger sticks trending up off NPH.   - Continue on NPH 6U with moderate ISS.   - Adjust as need     SKIN  # Drug Eruption   - treated    ETHICS/ GOC    - Attempted palliative discussion however family not interested and wishes for FULL CODE  - Family continues to want everything done despite HD failure on multiple oral pressor

## 2023-10-25 NOTE — PROGRESS NOTE ADULT - SUBJECTIVE AND OBJECTIVE BOX
Patient is a 73y old  Female who presents with a chief complaint of Respiratory distress (25 Oct 2023 11:02)      SUBJECTIVE / OVERNIGHT EVENTS: remains on Levo drip, remains hypotense, not able to tolerate HD UF, has 0.7 liters removed yesterday. completing Aztreonam course tomorrow    MEDICATIONS  (STANDING):  albuterol    0.083% 2.5 milliGRAM(s) Nebulizer every 6 hours  apixaban 5 milliGRAM(s) Oral every 12 hours  artificial tears (preservative free) Ophthalmic Solution 1 Drop(s) Both EYES every 4 hours  atorvastatin 10 milliGRAM(s) Oral at bedtime  aztreonam  IVPB 2000 milliGRAM(s) IV Intermittent <User Schedule>  chlorhexidine 0.12% Liquid 15 milliLiter(s) Oral Mucosa every 12 hours  chlorhexidine 2% Cloths 1 Application(s) Topical daily  dextrose 5%. 1000 milliLiter(s) (100 mL/Hr) IV Continuous <Continuous>  dextrose 5%. 1000 milliLiter(s) (50 mL/Hr) IV Continuous <Continuous>  dextrose 50% Injectable 25 Gram(s) IV Push once  dextrose 50% Injectable 25 Gram(s) IV Push once  dextrose 50% Injectable 25 Gram(s) IV Push once  dextrose 50% Injectable 12.5 Gram(s) IV Push once  droxidopa 600 milliGRAM(s) Oral every 8 hours  epoetin odalis (EPOGEN) Injectable 69456 Unit(s) IV Push <User Schedule>  ferrous    sulfate Liquid 300 milliGRAM(s) Oral daily  glucagon  Injectable 1 milliGRAM(s) IntraMuscular once  insulin lispro (ADMELOG) corrective regimen sliding scale   SubCutaneous every 6 hours  insulin NPH human recombinant 6 Unit(s) SubCutaneous every 6 hours  levETIRAcetam  Solution 1000 milliGRAM(s) Oral daily  levETIRAcetam  Solution 250 milliGRAM(s) Oral <User Schedule>  midodrine. 40 milliGRAM(s) Oral every 8 hours  norepinephrine Infusion 0.05 MICROgram(s)/kG/Min (6.23 mL/Hr) IV Continuous <Continuous>  pantoprazole  Injectable 40 milliGRAM(s) IV Push two times a day  petrolatum Ophthalmic Ointment 1 Application(s) Both EYES four times a day  sodium chloride 1 Gram(s) Oral two times a day    MEDICATIONS  (PRN):  acetaminophen   Oral Liquid .. 650 milliGRAM(s) Oral every 6 hours PRN Temp greater or equal to 38C (100.4F), Mild Pain (1 - 3)  dextrose Oral Gel 15 Gram(s) Oral once PRN Blood Glucose LESS THAN 70 milliGRAM(s)/deciliter  diphenhydrAMINE Elixir 50 milliGRAM(s) Oral once PRN Rash and/or Itching  sodium chloride 0.9% Bolus. 250 milliLiter(s) IV Bolus every 5 minutes PRN SBP LESS THAN or EQUAL to 90 mmHg  sodium chloride 0.9% lock flush 10 milliLiter(s) IV Push every 1 hour PRN Pre/post blood products, medications, blood draw, and to maintain line patency      Vital Signs Last 24 Hrs  T(F): 99.1 (10-25-23 @ 12:00), Max: 99.2 (10-25-23 @ 08:00)  HR: 112 (10-25-23 @ 14:00) (109 - 135)  BP: 106/36 (10-25-23 @ 14:00) (86/44 - 139/56)  RR: 24 (10-25-23 @ 14:00) (17 - 24)  SpO2: 100% (10-25-23 @ 14:00) (92% - 100%)  Telemetry:   CAPILLARY BLOOD GLUCOSE      POCT Blood Glucose.: 100 mg/dL (25 Oct 2023 11:20)  POCT Blood Glucose.: 94 mg/dL (25 Oct 2023 05:24)  POCT Blood Glucose.: 171 mg/dL (24 Oct 2023 23:44)  POCT Blood Glucose.: 176 mg/dL (24 Oct 2023 17:38)    I&O's Summary    24 Oct 2023 07:01  -  25 Oct 2023 07:00  --------------------------------------------------------  IN: 1585 mL / OUT: 1034 mL / NET: 551 mL    25 Oct 2023 07:01  -  25 Oct 2023 15:12  --------------------------------------------------------  IN: 289.8 mL / OUT: 0 mL / NET: 289.8 mL        PHYSICAL EXAM:  GENERAL: NAD, well-developed  HEAD:  Atraumatic, Normocephalic  EYES: EOMI, PERRLA, conjunctiva and sclera clear  NECK: Supple, No JVD  CHEST/LUNG: Clear to auscultation bilaterally; No wheeze  HEART: Regular rate and rhythm; No murmurs, rubs, or gallops  ABDOMEN: Soft, Nontender, Nondistended; Bowel sounds present  EXTREMITIES:  2+ Peripheral Pulses, No clubbing, cyanosis, or edema  PSYCH: AAOx3  NEUROLOGY: non-focal  SKIN: No rashes or lesions    LABS:                        9.0    19.33 )-----------( 437      ( 25 Oct 2023 03:30 )             29.0     10-25    136  |  101  |  26<H>  ----------------------------<  127<H>  4.1   |  25  |  1.29    Ca    8.8      25 Oct 2023 03:30  Phos  4.1     10-24  Mg     2.10     10-25    TPro  6.0  /  Alb  1.9<L>  /  TBili  <0.2  /  DBili  x   /  AST  17  /  ALT  10  /  AlkPhos  256<H>  10-25    PTT - ( 24 Oct 2023 07:13 )  PTT:73.4 sec      Urinalysis Basic - ( 25 Oct 2023 03:30 )    Color: x / Appearance: x / SG: x / pH: x  Gluc: 127 mg/dL / Ketone: x  / Bili: x / Urobili: x   Blood: x / Protein: x / Nitrite: x   Leuk Esterase: x / RBC: x / WBC x   Sq Epi: x / Non Sq Epi: x / Bacteria: x        RADIOLOGY & ADDITIONAL TESTS:    Imaging Personally Reviewed:    Consultant(s) Notes Reviewed:      Care Discussed with Consultants/Other Providers:

## 2023-10-25 NOTE — PROGRESS NOTE ADULT - SUBJECTIVE AND OBJECTIVE BOX
NEPHROLOGY     Patient seen and examined.    MEDICATIONS  (STANDING):  albuterol    0.083% 2.5 milliGRAM(s) Nebulizer every 6 hours  apixaban 5 milliGRAM(s) Oral every 12 hours  artificial tears (preservative free) Ophthalmic Solution 1 Drop(s) Both EYES every 4 hours  atorvastatin 10 milliGRAM(s) Oral at bedtime  aztreonam  IVPB 2000 milliGRAM(s) IV Intermittent <User Schedule>  chlorhexidine 0.12% Liquid 15 milliLiter(s) Oral Mucosa every 12 hours  chlorhexidine 2% Cloths 1 Application(s) Topical daily  dextrose 5%. 1000 milliLiter(s) (100 mL/Hr) IV Continuous <Continuous>  dextrose 5%. 1000 milliLiter(s) (50 mL/Hr) IV Continuous <Continuous>  dextrose 50% Injectable 12.5 Gram(s) IV Push once  dextrose 50% Injectable 25 Gram(s) IV Push once  dextrose 50% Injectable 25 Gram(s) IV Push once  dextrose 50% Injectable 25 Gram(s) IV Push once  droxidopa 200 milliGRAM(s) Oral <User Schedule>  droxidopa 400 milliGRAM(s) Oral every 8 hours  epoetin odalis (EPOGEN) Injectable 90561 Unit(s) IV Push <User Schedule>  ferrous    sulfate Liquid 300 milliGRAM(s) Oral daily  glucagon  Injectable 1 milliGRAM(s) IntraMuscular once  insulin lispro (ADMELOG) corrective regimen sliding scale   SubCutaneous every 6 hours  insulin NPH human recombinant 6 Unit(s) SubCutaneous every 6 hours  levETIRAcetam  Solution 1000 milliGRAM(s) Oral daily  levETIRAcetam  Solution 250 milliGRAM(s) Oral <User Schedule>  midodrine. 30 milliGRAM(s) Oral every 8 hours  norepinephrine Infusion 0.05 MICROgram(s)/kG/Min (6.23 mL/Hr) IV Continuous <Continuous>  pantoprazole  Injectable 40 milliGRAM(s) IV Push two times a day  petrolatum Ophthalmic Ointment 1 Application(s) Both EYES four times a day  sodium chloride 1 Gram(s) Oral two times a day    VITALS:  T(C): , Max: 37.3 (10-25-23 @ 08:00)  T(F): , Max: 99.2 (10-25-23 @ 08:00)  HR: 109 (10-25-23 @ 09:41)  BP: 107/45 (10-25-23 @ 09:00)  BP(mean): 64 (10-25-23 @ 09:00)  RR: 24 (10-25-23 @ 09:00)  SpO2: 100% (10-25-23 @ 09:41)    PHYSICAL EXAM:  Constitutional: trach to vent  HEENT: + tracheostomy, + NGT  Respiratory: Coarse BS  Cardiovascular: S1 and S2  Gastrointestinal: BS+, soft, NT/ND  Extremities: ++ peripheral edema  Neurological: UTO  : No Wheeler  [facility]    LABS:                        9.0    19.33 )-----------( 437      ( 25 Oct 2023 03:30 )             29.0     10-25    136  |  101  |  26<H>  ----------------------------<  127<H>  4.1   |  25  |  1.29    Ca    8.8      25 Oct 2023 03:30  Phos  4.1     10-24  Mg     2.10     10-25    TPro  6.0  /  Alb  1.9<L>  /  TBili  <0.2  /  DBili  x   /  AST  17  /  ALT  10  /  AlkPhos  256<H>  10-25    RADIOLOGY & ADDITIONAL STUDIES:    rad< from: Xray Chest 1 View- PORTABLE-Urgent (Xray Chest 1 View- PORTABLE-Urgent .) (10.24.23 @ 00:35) >    IMPRESSION:  Tubes and lines in place.    Pulmonary edema with right apical opacity unchanged.    --- End of Report ---      LIZA WOODY MD; Attending Radiologist  This document has been electronically signed. Oct 24 2023 10:37AM    < end of copied text >   NEPHROLOGY     Patient seen and examined with spouse at bedside, pt salbador to paula, on levo gtt. HD yesterday removed 0.7L, unable to tolerate ordered UF goal due to hypotension.     MEDICATIONS  (STANDING):  albuterol    0.083% 2.5 milliGRAM(s) Nebulizer every 6 hours  apixaban 5 milliGRAM(s) Oral every 12 hours  artificial tears (preservative free) Ophthalmic Solution 1 Drop(s) Both EYES every 4 hours  atorvastatin 10 milliGRAM(s) Oral at bedtime  aztreonam  IVPB 2000 milliGRAM(s) IV Intermittent <User Schedule>  chlorhexidine 0.12% Liquid 15 milliLiter(s) Oral Mucosa every 12 hours  chlorhexidine 2% Cloths 1 Application(s) Topical daily  dextrose 5%. 1000 milliLiter(s) (100 mL/Hr) IV Continuous <Continuous>  dextrose 5%. 1000 milliLiter(s) (50 mL/Hr) IV Continuous <Continuous>  dextrose 50% Injectable 12.5 Gram(s) IV Push once  dextrose 50% Injectable 25 Gram(s) IV Push once  dextrose 50% Injectable 25 Gram(s) IV Push once  dextrose 50% Injectable 25 Gram(s) IV Push once  droxidopa 200 milliGRAM(s) Oral <User Schedule>  droxidopa 400 milliGRAM(s) Oral every 8 hours  epoetin odalis (EPOGEN) Injectable 42810 Unit(s) IV Push <User Schedule>  ferrous    sulfate Liquid 300 milliGRAM(s) Oral daily  glucagon  Injectable 1 milliGRAM(s) IntraMuscular once  insulin lispro (ADMELOG) corrective regimen sliding scale   SubCutaneous every 6 hours  insulin NPH human recombinant 6 Unit(s) SubCutaneous every 6 hours  levETIRAcetam  Solution 1000 milliGRAM(s) Oral daily  levETIRAcetam  Solution 250 milliGRAM(s) Oral <User Schedule>  midodrine. 30 milliGRAM(s) Oral every 8 hours  norepinephrine Infusion 0.05 MICROgram(s)/kG/Min (6.23 mL/Hr) IV Continuous <Continuous>  pantoprazole  Injectable 40 milliGRAM(s) IV Push two times a day  petrolatum Ophthalmic Ointment 1 Application(s) Both EYES four times a day  sodium chloride 1 Gram(s) Oral two times a day    VITALS:  T(C): , Max: 37.3 (10-25-23 @ 08:00)  T(F): , Max: 99.2 (10-25-23 @ 08:00)  HR: 109 (10-25-23 @ 09:41)  BP: 107/45 (10-25-23 @ 09:00)  BP(mean): 64 (10-25-23 @ 09:00)  RR: 24 (10-25-23 @ 09:00)  SpO2: 100% (10-25-23 @ 09:41)    PHYSICAL EXAM:  Constitutional: trach to vent  HEENT: + tracheostomy, + NGT  Respiratory: Coarse BS  Cardiovascular: S1 and S2  Gastrointestinal: BS+, soft, NT/ND  Extremities: ++ peripheral edema  Neurological: UTO  : No Wheeler  Vascular Access: shiley    LABS:                        9.0    19.33 )-----------( 437      ( 25 Oct 2023 03:30 )             29.0     10-25    136  |  101  |  26<H>  ----------------------------<  127<H>  4.1   |  25  |  1.29    Ca    8.8      25 Oct 2023 03:30  Phos  4.1     10-24  Mg     2.10     10-25    TPro  6.0  /  Alb  1.9<L>  /  TBili  <0.2  /  DBili  x   /  AST  17  /  ALT  10  /  AlkPhos  256<H>  10-25    RADIOLOGY & ADDITIONAL STUDIES:    rad< from: Xray Chest 1 View- PORTABLE-Urgent (Xray Chest 1 View- PORTABLE-Urgent .) (10.24.23 @ 00:35) >    IMPRESSION:  Tubes and lines in place.    Pulmonary edema with right apical opacity unchanged.    --- End of Report ---      LIZA WOODY MD; Attending Radiologist  This document has been electronically signed. Oct 24 2023 10:37AM    < end of copied text >   NEPHROLOGY     Patient seen and examined with spouse at bedside, pt salbador to paula, on levo gtt. HD yesterday removed 0.7L, unable to tolerate ordered UF goal due to hypotension.     MEDICATIONS  (STANDING):  albuterol    0.083% 2.5 milliGRAM(s) Nebulizer every 6 hours  apixaban 5 milliGRAM(s) Oral every 12 hours  artificial tears (preservative free) Ophthalmic Solution 1 Drop(s) Both EYES every 4 hours  atorvastatin 10 milliGRAM(s) Oral at bedtime  aztreonam  IVPB 2000 milliGRAM(s) IV Intermittent <User Schedule>  chlorhexidine 0.12% Liquid 15 milliLiter(s) Oral Mucosa every 12 hours  chlorhexidine 2% Cloths 1 Application(s) Topical daily  dextrose 5%. 1000 milliLiter(s) (100 mL/Hr) IV Continuous <Continuous>  dextrose 5%. 1000 milliLiter(s) (50 mL/Hr) IV Continuous <Continuous>  dextrose 50% Injectable 12.5 Gram(s) IV Push once  dextrose 50% Injectable 25 Gram(s) IV Push once  dextrose 50% Injectable 25 Gram(s) IV Push once  dextrose 50% Injectable 25 Gram(s) IV Push once  droxidopa 200 milliGRAM(s) Oral <User Schedule>  droxidopa 400 milliGRAM(s) Oral every 8 hours  epoetin odalis (EPOGEN) Injectable 96163 Unit(s) IV Push <User Schedule>  ferrous    sulfate Liquid 300 milliGRAM(s) Oral daily  glucagon  Injectable 1 milliGRAM(s) IntraMuscular once  insulin lispro (ADMELOG) corrective regimen sliding scale   SubCutaneous every 6 hours  insulin NPH human recombinant 6 Unit(s) SubCutaneous every 6 hours  levETIRAcetam  Solution 1000 milliGRAM(s) Oral daily  levETIRAcetam  Solution 250 milliGRAM(s) Oral <User Schedule>  midodrine. 30 milliGRAM(s) Oral every 8 hours  norepinephrine Infusion 0.05 MICROgram(s)/kG/Min (6.23 mL/Hr) IV Continuous <Continuous>  pantoprazole  Injectable 40 milliGRAM(s) IV Push two times a day  petrolatum Ophthalmic Ointment 1 Application(s) Both EYES four times a day  sodium chloride 1 Gram(s) Oral two times a day    VITALS:  T(C): , Max: 37.3 (10-25-23 @ 08:00)  T(F): , Max: 99.2 (10-25-23 @ 08:00)  HR: 109 (10-25-23 @ 09:41)  BP: 107/45 (10-25-23 @ 09:00)  BP(mean): 64 (10-25-23 @ 09:00)  RR: 24 (10-25-23 @ 09:00)  SpO2: 100% (10-25-23 @ 09:41)    PHYSICAL EXAM:  Constitutional: trach to vent  HEENT: + tracheostomy, + NGT  Respiratory: Coarse BS  Cardiovascular: tachy   Gastrointestinal: BS+, soft, NT/ND  Extremities: ++ peripheral edema  Neurological: UTO  : No Wheeler  Vascular Access: shiley    LABS:                        9.0    19.33 )-----------( 437      ( 25 Oct 2023 03:30 )             29.0     10-25    136  |  101  |  26<H>  ----------------------------<  127<H>  4.1   |  25  |  1.29    Ca    8.8      25 Oct 2023 03:30  Phos  4.1     10-24  Mg     2.10     10-25    TPro  6.0  /  Alb  1.9<L>  /  TBili  <0.2  /  DBili  x   /  AST  17  /  ALT  10  /  AlkPhos  256<H>  10-25    RADIOLOGY & ADDITIONAL STUDIES:    rad< from: Xray Chest 1 View- PORTABLE-Urgent (Xray Chest 1 View- PORTABLE-Urgent .) (10.24.23 @ 00:35) >    IMPRESSION:  Tubes and lines in place.    Pulmonary edema with right apical opacity unchanged.    --- End of Report ---      LIZA WOODY MD; Attending Radiologist  This document has been electronically signed. Oct 24 2023 10:37AM    < end of copied text >

## 2023-10-26 NOTE — PROGRESS NOTE ADULT - SUBJECTIVE AND OBJECTIVE BOX
Subjective: Patient seen and examined. No new events except as noted.   remains in ICU   REVIEW OF SYSTEMS:  Unable to obtain      MEDICATIONS:  MEDICATIONS  (STANDING):  albuterol    0.083% 2.5 milliGRAM(s) Nebulizer every 6 hours  apixaban 5 milliGRAM(s) Oral every 12 hours  artificial tears (preservative free) Ophthalmic Solution 1 Drop(s) Both EYES every 4 hours  atorvastatin 10 milliGRAM(s) Oral at bedtime  aztreonam  IVPB 2000 milliGRAM(s) IV Intermittent <User Schedule>  chlorhexidine 0.12% Liquid 15 milliLiter(s) Oral Mucosa every 12 hours  chlorhexidine 2% Cloths 1 Application(s) Topical daily  dextrose 5%. 1000 milliLiter(s) (100 mL/Hr) IV Continuous <Continuous>  dextrose 5%. 1000 milliLiter(s) (50 mL/Hr) IV Continuous <Continuous>  dextrose 50% Injectable 25 Gram(s) IV Push once  dextrose 50% Injectable 12.5 Gram(s) IV Push once  dextrose 50% Injectable 25 Gram(s) IV Push once  dextrose 50% Injectable 25 Gram(s) IV Push once  droxidopa 600 milliGRAM(s) Oral every 8 hours  epoetin odalis (EPOGEN) Injectable 06736 Unit(s) IV Push <User Schedule>  ferrous    sulfate Liquid 300 milliGRAM(s) Oral daily  glucagon  Injectable 1 milliGRAM(s) IntraMuscular once  insulin lispro (ADMELOG) corrective regimen sliding scale   SubCutaneous every 6 hours  insulin NPH human recombinant 6 Unit(s) SubCutaneous every 6 hours  levETIRAcetam  Solution 1000 milliGRAM(s) Oral daily  levETIRAcetam  Solution 250 milliGRAM(s) Oral <User Schedule>  midodrine. 40 milliGRAM(s) Oral <User Schedule>  norepinephrine Infusion 0.05 MICROgram(s)/kG/Min (6.23 mL/Hr) IV Continuous <Continuous>  pantoprazole  Injectable 40 milliGRAM(s) IV Push two times a day  petrolatum Ophthalmic Ointment 1 Application(s) Both EYES four times a day  sodium chloride 1 Gram(s) Oral two times a day      PHYSICAL EXAM:  T(C): 36.8 (10-26-23 @ 08:00), Max: 37.3 (10-25-23 @ 12:00)  HR: 111 (10-26-23 @ 09:00) (104 - 121)  BP: 127/65 (10-26-23 @ 09:00) (99/76 - 163/76)  RR: 24 (10-26-23 @ 09:00) (0 - 24)  SpO2: 100% (10-26-23 @ 09:00) (92% - 100%)  Wt(kg): --  I&O's Summary    25 Oct 2023 07:01  -  26 Oct 2023 07:00  --------------------------------------------------------  IN: 833.8 mL / OUT: 0 mL / NET: 833.8 mL    26 Oct 2023 07:01  -  26 Oct 2023 09:49  --------------------------------------------------------  IN: 82.5 mL / OUT: 0 mL / NET: 82.5 mL            Appearance: NAD, +trach  HEENT: dry oral mucosa  Lymphatic: No lymphadenopathy  Cardiovascular: Normal S1 S2, No JVD, No murmurs, No edema  Respiratory: Decreased BS, + trach to vent	  Neuro: opens eyes  Gastrointestinal: Soft, Non-tender, + BS, + NGT  Skin: No rashes, No ecchymoses, No cyanosis	  Extremities: No strength/ROM 2/2 sedation, + BL LE edema  Vascular: Peripheral pulses palpable 2+ bilaterally  Anasarca   Mode: AC/ CMV (Assist Control/ Continuous Mandatory Ventilation), RR (machine): 24, FiO2: 35, PEEP: 8, ITime: 0.8, MAP: 19, PC: 35, PIP: 44    LABS:    CARDIAC MARKERS:                                8.6    16.05 )-----------( 379      ( 26 Oct 2023 02:00 )             28.4     10-26    135  |  101  |  32<H>  ----------------------------<  144<H>  4.3   |  25  |  1.46<H>    Ca    8.8      26 Oct 2023 02:00  Phos  4.6     10-26  Mg     2.10     10-26    TPro  5.7<L>  /  Alb  1.8<L>  /  TBili  <0.2  /  DBili  x   /  AST  22  /  ALT  10  /  AlkPhos  240<H>  10-26            TELEMETRY: 	 SR   ECG:  	  RADIOLOGY:   DIAGNOSTIC TESTING:  [ ] Echocardiogram:  [ ]  Catheterization:  [ ] Stress Test:    OTHER:

## 2023-10-26 NOTE — PROGRESS NOTE ADULT - ATTENDING COMMENTS
73 F with CKD5, wheel chair bound, prior CVA here with prolonged hospital course now trach, recurrent infections, now dialysis dependent here with shock of unclear etiology    HD catheter removed and leukocytosis improving, inserted again on 10/24 for HD   off eliquis, on heparin gtt and tolerating, switch back to eliquis  on full vent support, PS trials as tolerated  complete aztreonam for pseudomonas pneumonia as per ID  needs vasopressors for HD, c/w increased midodrine and droxidopa, will give extra droxidopa pre-HD  recheck cortisol  ongoing GOC.

## 2023-10-26 NOTE — PROGRESS NOTE ADULT - ASSESSMENT
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 While in MICU patient noted with worsening renal failure requiring HD initiation, CGE/ Melena s/p EGD 8/31 found with esophagitis and erosive gastropathy, new right cerebellar infarct and fevers second to ESBL COLI and MSSA VAP, MSSA bacteremia, left ear otitis externa and drug rxn to cephalosporins Course further complicated by prolonged vent time s/p tracheostomy 9/1 and transferred to RCU 9/3 completed by recurrent fevers with high PIP, respiratory acidosis and concern for volume overloaded.   CTH repeated 9/26 for concern for encephalopathy - has known now - chronic bilateral cerebellar infarcts, L PCA infarct. L parietal hypodensity seen on prior CT likely artifactual  Repeat CTH 10/3 - unchanged  EEG 10/5 with L posterocentral/temporal spikes/sharp waves - doubt true focal seizure upon further review of EEG     Impression:   ? Focal seizure - has hx of bilateral R > L tremors, now on Keppra  Metabolic encephalopathy related to shock, infection, doubt new stroke  L PCA stroke, ESUS s/p ILR, also with bilateral centrum semiovale watershed infarcts   Multiple embolic strokes on MRI 8/17 - R cerebellum, midbrain, multiple other tiny embolic infarcts.   Dementia   MSSA bacteremia, r/o endocarditis s/p Daptomycin  Acute popliteal DVT on Eliquis   Anemia     Recommendations   [] care per MICU for persistent hypotension  [] has multiple areas of epileptogenic potential on EEG, no clear seizure correlate seen. On Keppra but no improvement in mental status  [] consider MRI brain once stable but doubt will change mgmt  [] mgmt of hypotension per piumary team, remains full code  [] Keppra 500mg BID with extra 250mg after HD on HD days  [] Abx per ID  [] C/w home Atorvastatin 10 mg qhs   [] continue to address GOC with family, poor prognosis    Katty Charles, DO  Vascular Neurology  Office [phone]

## 2023-10-26 NOTE — PROGRESS NOTE ADULT - SUBJECTIVE AND OBJECTIVE BOX
S:  Pt seen and examined. Still requiring pressors for HD      Medications: MEDICATIONS  (STANDING):  albuterol    0.083% 2.5 milliGRAM(s) Nebulizer every 6 hours  apixaban 5 milliGRAM(s) Oral every 12 hours  artificial tears (preservative free) Ophthalmic Solution 1 Drop(s) Both EYES every 4 hours  atorvastatin 10 milliGRAM(s) Oral at bedtime  aztreonam  IVPB 2000 milliGRAM(s) IV Intermittent <User Schedule>  chlorhexidine 0.12% Liquid 15 milliLiter(s) Oral Mucosa every 12 hours  chlorhexidine 2% Cloths 1 Application(s) Topical daily  dextrose 5%. 1000 milliLiter(s) (100 mL/Hr) IV Continuous <Continuous>  dextrose 5%. 1000 milliLiter(s) (50 mL/Hr) IV Continuous <Continuous>  dextrose 50% Injectable 12.5 Gram(s) IV Push once  dextrose 50% Injectable 25 Gram(s) IV Push once  dextrose 50% Injectable 25 Gram(s) IV Push once  dextrose 50% Injectable 25 Gram(s) IV Push once  droxidopa 600 milliGRAM(s) Oral every 8 hours  epoetin odalis (EPOGEN) Injectable 12254 Unit(s) IV Push <User Schedule>  ferrous    sulfate Liquid 300 milliGRAM(s) Oral daily  glucagon  Injectable 1 milliGRAM(s) IntraMuscular once  insulin lispro (ADMELOG) corrective regimen sliding scale   SubCutaneous every 6 hours  insulin NPH human recombinant 6 Unit(s) SubCutaneous every 6 hours  levETIRAcetam  Solution 1000 milliGRAM(s) Oral daily  levETIRAcetam  Solution 250 milliGRAM(s) Oral <User Schedule>  midodrine. 40 milliGRAM(s) Oral <User Schedule>  norepinephrine Infusion 0.05 MICROgram(s)/kG/Min (6.23 mL/Hr) IV Continuous <Continuous>  pantoprazole  Injectable 40 milliGRAM(s) IV Push two times a day  petrolatum Ophthalmic Ointment 1 Application(s) Both EYES four times a day  sodium chloride 1 Gram(s) Oral two times a day    MEDICATIONS  (PRN):  acetaminophen   Oral Liquid .. 650 milliGRAM(s) Oral every 6 hours PRN Temp greater or equal to 38C (100.4F), Mild Pain (1 - 3)  dextrose Oral Gel 15 Gram(s) Oral once PRN Blood Glucose LESS THAN 70 milliGRAM(s)/deciliter  diphenhydrAMINE Elixir 50 milliGRAM(s) Oral once PRN Rash and/or Itching  droxidopa 200 milliGRAM(s) Oral <User Schedule> PRN Prior to hemodialysis  sodium chloride 0.9% Bolus. 250 milliLiter(s) IV Bolus every 5 minutes PRN SBP LESS THAN or EQUAL to 90 mmHg  sodium chloride 0.9% lock flush 10 milliLiter(s) IV Push every 1 hour PRN Pre/post blood products, medications, blood draw, and to maintain line patency       Vitals:  Vital Signs Last 24 Hrs  T(C): 36 (26 Oct 2023 12:00), Max: 37.3 (25 Oct 2023 16:00)  T(F): 96.8 (26 Oct 2023 12:00), Max: 99.1 (25 Oct 2023 16:00)  HR: 137 (26 Oct 2023 13:35) (100 - 137)  BP: 122/69 (26 Oct 2023 13:35) (44/35 - 163/76)  BP(mean): 81 (26 Oct 2023 13:35) (35 - 99)  RR: 24 (26 Oct 2023 13:35) (0 - 24)  SpO2: 100% (26 Oct 2023 13:35) (92% - 100%)    Parameters below as of 26 Oct 2023 13:00  Patient On (Oxygen Delivery Method): ventilator    O2 Concentration (%): 35      Neurological Exam:  Mental Status: Eyes closed, off sedation. Does not follow commands,  + trach to vent and NGT   Cranial Nerves: PERRL slightly sluggish, L subconjunctival hemorrhage improved  Motor: Moves upper extremities spontaneously R >L, tremor R > L  no movement noted in lowers  Sensation: WD to noxious x4    I personally reviewed the below data/images/labs:    LABS:                          8.6    16.05 )-----------( 379      ( 26 Oct 2023 02:00 )             28.4     10-26    135  |  101  |  32<H>  ----------------------------<  144<H>  4.3   |  25  |  1.46<H>    Ca    8.8      26 Oct 2023 02:00  Phos  4.6     10-26  Mg     2.10     10-26    TPro  5.7<L>  /  Alb  1.8<L>  /  TBili  <0.2  /  DBili  x   /  AST  22  /  ALT  10  /  AlkPhos  240<H>  10-26    LIVER FUNCTIONS - ( 26 Oct 2023 02:00 )  Alb: 1.8 g/dL / Pro: 5.7 g/dL / ALK PHOS: 240 U/L / ALT: 10 U/L / AST: 22 U/L / GGT: x             Urinalysis Basic - ( 26 Oct 2023 02:00 )    Color: x / Appearance: x / SG: x / pH: x  Gluc: 144 mg/dL / Ketone: x  / Bili: x / Urobili: x   Blood: x / Protein: x / Nitrite: x   Leuk Esterase: x / RBC: x / WBC x   Sq Epi: x / Non Sq Epi: x / Bacteria: x          < from: CT Head No Cont (08.15.23 @ 17:20) >    ACC: [id number] EXAM:  CT BRAIN   ORDERED BY: MINAL SAM     PROCEDURE DATE:  08/15/2023          INTERPRETATION:  Clinical indication: Change in neuro exam.    Multiple axial sections were performed from base to vertex without   contrast enhancement. Coronal and sagittal reconstructions were   reformatted well.    This exam is compared prior head CT performed on August 11, 2023    Parenchymal volume loss and chronic microvessel ischemic changes are   again seen.    Abnormal low-attenuation involving the left occipital cortical   subcortical region is again seen. 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    No evidence of acute hemorrhage mass or mass effect is seen.    Evaluation of the osseous structures with appropriate window demonstrates   sclerotic changes about the left mastoid region which appears stable.   Opacification left middle ear region is again seen.    Patient is status post bilateral cataract surgery.    Impression: Stable exam.    < end of copied text >    vEEG:    Clinical Impression:  - Severe diffuse non-specific cerebral dysfunction  - There were no epileptiform abnormalities or seizures recorded.        CTH 8/11:    VENTRICLES AND SULCI: Age-appropriate involutional change  INTRA-AXIAL:  Old left PCA infarct as seen on the prior unchanged.   Microvascular ischemic changes involving the periventricular and   subcortical white matter as seen previously  EXTRA-AXIAL:  No mass or collection is seen.  VISUALIZED SINUSES:  Clear.  VISUALIZED MASTOIDS: Left mastoid sclerosis  CALVARIUM: Infiltrative appearance tothe calvarium may be indicative of   marrow infiltration on the basis of patient's known diagnosis of breast   cancer. MISCELLANEOUS:  None.    IMPRESSION:  No significant interval change compared with 7/17/2023 in   left PCA infarct which is old. Microvascular ischemic changes involving   the periventricular and subcortical white matter as seen   previously.Questionable lesions at the level of the calvarium related to   possible breast CA. Clinical correlation recommended.    --- End of Report ---      ct< from: CT Head No Cont (09.26.23 @ 21:02) >  IMPRESSION: Vague questionable areas of hypoattenuation within the   bilateral cerebellar hemispheres which may be compatible with   acute/subacute ischemia. Recommend further evaluation with a brain MRI   study, provided there are no MRI contraindications.    No acute intracranial hemorrhage.    Previously seen questionable vague wedge-shaped area of hypoattenuation   in the left parietal lobe with artifactual secondary to motion and volume   averaging with a prominent sulcus.    Chronic left occipital lobe infarct.    < end of copied text >    < from: CT Head No Cont (10.03.23 @ 20:46) >    IMPRESSION:    No acute intracranial hemorrhage or mass effect. Evaluation of the   posterior fossa degraded by streak artifact from dental amalgam.   Lucencies in the bilateral cerebellum may be artifactual.    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    Bilateral middle ear and mastoid effusions which are also seen on prior   exam.    EEG Classification / Summary:  Abnormal EEG in the awake, drowsy states.   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   -No seizures

## 2023-10-26 NOTE — PROGRESS NOTE ADULT - ASSESSMENT
74 y/o F well known to me [facility]. she was admitted at [facility] 7/12-7/22 w aspiration PNA, was treated w CEFEPIME, developed an allergic rash,  dCHF, + MAC on AFB culture, had been progressively getting more and more lethargic and dyspneic at home since DC.   In  am of 8/11/23  ptn presented with respiratory distress w hypoxia and hypercarbia requiring intubation 2/2 volume overload +/- Asp PNA      Neuro   not responsive  Baseline MS AOx3, aphasic   - h/o CVA , on aspirin and statin . resumed w feeding tube, ASA resumed 8/26  - eeg  2/2 tremors, no sz focus, on KEPPRA  - less responsive in the past few days  - MRI 8/17:  new R cerebellar infarct, old left PCA/Occipital infarct. probably embolic in nature, did not tolerate full AC in the past, STEPHANIE is neg , no shunts observed  - ptn is poorly reactive, rpt scan done, no further CVA seen.     Cardiac   cardiology following  CHFpEF   TTE 7/2023 with EF 59%, with severe LVH and diastolic dysfunction   on Levo drip 2/2 hypotension and Midodrine and Droxidopa    Pulmonary   Acute hypercapnic and hypoxemic respiratory failure   prolonged intubation, now  trache to  vent, has copious secretions      Renal   on HD via L IJ Shiley, tunneled catheter when clinically stable  HD MWF, midodrine assisted, PUF in between HD days, unable to remove proper volume 2/2 hypotension.   permacath when clinically stable      GI  NPO  on tube feeds  coffee ground emesis x 1 8/24, melena overnight 8/31, s/p 1UPRBC, EGD/Colonoscopy: erosive gastropathy, esophagisits, on colonoscopy old blood seen no active bleeding, poor prep  family to decide re PEG placement    Endocrine  on Insulin: NPH, Admelog    ID   complicated infectious hospital course  treated for MSSA bacteremia, aspiration PNA.  sputum + for pseudomonas, ptn was initially on zosyn but was allergic, then was switched to aztreonam   Aztreonam was switched to polymyxin for a possible allergy but ptn didnt have a reaction to aztreonam, she had a reaction to Zosyn.   spike temps again while off Abx, Aztreonam resumed 10/17, completed a 10 day course 10/27      ID course complicated with multiple ABx allergies  also treated for Left O. 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 and she remains full code at present

## 2023-10-26 NOTE — PROGRESS NOTE ADULT - ASSESSMENT
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       NEUROLOGY  #AMS  Multiple etiologies including active infection vs CVA.   - MR head performed w/ new infarct and old infarcts, EEG without seizure events but with high foci potential   - c/w ASA, lipitor  - c/w keppra      CARDIOVASCULAR  # Septic vs vasoplegic shock   Etiology likely septic iso active infection. 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   - resolved    RESPIRATORY  # AHRF second to volume overload   - CKD vs HFpEF w/ hypercarbia 2/2 volume overload requiring intubation, trach, and HD initiation  - Continue on albuterol and chest PT  - Continue volume removal with HD      #tracheomalacia   - CT CHEST (9/8) with tracheomalacia, BL pleural effusions and continued consolidations     #mechanical ventilation   - PC 24/35/8/40     GI  # UGIB   - Continue on PPI BID    # Dysphagia   - NGT-TF     RENAL  # ESRD  - HD MWF TIW with midodrine and droxidopa  - ICU for vasopressor assisted volume removal, cap to 1 pressor and not offering CRRT due to comorbidities and prognosis   - will titrate droxidopa to maintain off pressors during dialysis       INFECTIOUS DISEASE  # ESBL ECOLI and MSSA VAP c/b MSSA bacteremia   - hx of MSSA bacteremia, ESBL E. Coli sputum Cx, BAL w/ MSSA  - treated with abx     # Proteus and  PSA VAP/ Trachitis   - Overall difficulty with ABX tailoring given allergic rxns and resistant organisms  - aztreonam day 9, f/u ID recs     # MAC   - outpatient treatment    HEME  # Anemia second to GIB vs renal disease   - received transfusions during stay  - management as per renal, PPI    VASCULAR   # LLE POP DVT   - on Eliquis    # HD access  - TRISHA TAYLOR (9/27 - 10/21)  - TRISHA taylor replaced for HD     ONC   # Hx of Breast CA   - Patient dx in 2018 and s/p BL mastectomy (radical on right) and chemo and RT.     ENDOCRINE  # IDDM2   - Continued on NPH with ISS, however noted with hypoglycemic episodes and NPH dc'ed.    - Finger sticks trending up off NPH.   - Continue on NPH 6U with moderate ISS.   - Adjust as need     SKIN  # Drug Eruption   - treated    ETHICS/ GOC    - Attempted palliative discussion however family not interested and wishes for FULL CODE  - Family continues to want everything done despite HD failure on multiple oral pressor   74 YO F with PMHx of IDDM2, HTN, Diabetic Nephropathic CKD, HFpEF, Breast Cancer s/p BL mastectomy, chemotherapy and radiation (2018), Respiratory and Cardiac Arrest (2018), 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       NEUROLOGY  #AMS  Multiple etiologies including active infection vs CVA.   - MR head performed w/ new infarct and old infarcts, EEG without seizure events but with high foci potential   - c/w ASA, lipitor  - c/w keppra    - will obtain another MRI     CARDIOVASCULAR  # Septic vs vasoplegic shock   Etiology likely septic iso active infection. 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   - resolved    RESPIRATORY  # AHRF second to volume overload   - CKD vs HFpEF w/ hypercarbia 2/2 volume overload requiring intubation, trach, and HD initiation  - Continue on albuterol and chest PT  - Continue volume removal with HD      #tracheomalacia   - CT CHEST (9/8) with tracheomalacia, BL pleural effusions and continued consolidations     #mechanical ventilation   - PC 24/35/8/40     GI  # UGIB   - Continue on PPI BID    # Dysphagia   - NGT-TF     RENAL  # ESRD  - HD MWF TIW with midodrine and droxidopa  - ICU for vasopressor assisted volume removal, cap to 1 pressor and not offering CRRT due to comorbidities and prognosis   - will titrate droxidopa to maintain off pressors during dialysis       INFECTIOUS DISEASE  # ESBL ECOLI and MSSA VAP c/b MSSA bacteremia   - hx of MSSA bacteremia, ESBL E. Coli sputum Cx, BAL w/ MSSA  - treated with abx     # Proteus and  PSA VAP/ Trachitis   - Overall difficulty with ABX tailoring given allergic rxns and resistant organisms  - aztreonam day 10, finished course    # MAC   - outpatient treatment    HEME  # Anemia second to GIB vs renal disease   - received transfusions during stay  - management as per renal, PPI    VASCULAR   # LLE POP DVT   - on Eliquis    # HD access  - TRISHA TAYLOR (9/27 - 10/21)  - TRISHA taylor replaced for HD     ONC   # Hx of Breast CA   - Patient dx in 2018 and s/p BL mastectomy (radical on right) and chemo and RT.     ENDOCRINE  # IDDM2   - Continued on NPH with ISS, however noted with hypoglycemic episodes and NPH dc'ed.    - Finger sticks trending up off NPH.   - Continue on NPH 6U with moderate ISS.   - Adjust as need     SKIN  # Drug Eruption   - treated    ETHICS/ GOC    - Attempted palliative discussion however family not interested and wishes for FULL CODE  - Family continues to want everything done despite HD failure on multiple oral pressor

## 2023-10-26 NOTE — PROGRESS NOTE ADULT - SUBJECTIVE AND OBJECTIVE BOX
Patient is a 73y old  Female who presents with a chief complaint of Respiratory distress (26 Oct 2023 13:45)      SUBJECTIVE / OVERNIGHT EVENTS: remains in ICU, was off LEVO, now resumed    MEDICATIONS  (STANDING):  albuterol    0.083% 2.5 milliGRAM(s) Nebulizer every 6 hours  apixaban 5 milliGRAM(s) Oral every 12 hours  artificial tears (preservative free) Ophthalmic Solution 1 Drop(s) Both EYES every 4 hours  atorvastatin 10 milliGRAM(s) Oral at bedtime  aztreonam  IVPB 2000 milliGRAM(s) IV Intermittent <User Schedule>  chlorhexidine 0.12% Liquid 15 milliLiter(s) Oral Mucosa every 12 hours  chlorhexidine 2% Cloths 1 Application(s) Topical daily  dextrose 5%. 1000 milliLiter(s) (50 mL/Hr) IV Continuous <Continuous>  dextrose 5%. 1000 milliLiter(s) (100 mL/Hr) IV Continuous <Continuous>  dextrose 50% Injectable 12.5 Gram(s) IV Push once  dextrose 50% Injectable 25 Gram(s) IV Push once  dextrose 50% Injectable 25 Gram(s) IV Push once  dextrose 50% Injectable 25 Gram(s) IV Push once  droxidopa 600 milliGRAM(s) Oral every 8 hours  epoetin odalis (EPOGEN) Injectable 49417 Unit(s) IV Push <User Schedule>  ferrous    sulfate Liquid 300 milliGRAM(s) Oral daily  glucagon  Injectable 1 milliGRAM(s) IntraMuscular once  insulin lispro (ADMELOG) corrective regimen sliding scale   SubCutaneous every 6 hours  insulin NPH human recombinant 6 Unit(s) SubCutaneous every 6 hours  levETIRAcetam  Solution 1000 milliGRAM(s) Oral daily  levETIRAcetam  Solution 250 milliGRAM(s) Oral <User Schedule>  midodrine. 40 milliGRAM(s) Oral <User Schedule>  norepinephrine Infusion 0.05 MICROgram(s)/kG/Min (6.23 mL/Hr) IV Continuous <Continuous>  pantoprazole  Injectable 40 milliGRAM(s) IV Push two times a day  petrolatum Ophthalmic Ointment 1 Application(s) Both EYES four times a day  sodium chloride 1 Gram(s) Oral two times a day    MEDICATIONS  (PRN):  acetaminophen   Oral Liquid .. 650 milliGRAM(s) Oral every 6 hours PRN Temp greater or equal to 38C (100.4F), Mild Pain (1 - 3)  dextrose Oral Gel 15 Gram(s) Oral once PRN Blood Glucose LESS THAN 70 milliGRAM(s)/deciliter  diphenhydrAMINE Elixir 50 milliGRAM(s) Oral once PRN Rash and/or Itching  droxidopa 200 milliGRAM(s) Oral <User Schedule> PRN Prior to hemodialysis  sodium chloride 0.9% Bolus. 250 milliLiter(s) IV Bolus every 5 minutes PRN SBP LESS THAN or EQUAL to 90 mmHg  sodium chloride 0.9% lock flush 10 milliLiter(s) IV Push every 1 hour PRN Pre/post blood products, medications, blood draw, and to maintain line patency      Vital Signs Last 24 Hrs  T(F): 97.4 (10-26-23 @ 14:40), Max: 99 (10-26-23 @ 00:00)  HR: 118 (10-26-23 @ 16:05) (100 - 137)  BP: 147/77 (10-26-23 @ 16:00) (44/35 - 163/76)  RR: 24 (10-26-23 @ 16:00) (0 - 24)  SpO2: 100% (10-26-23 @ 16:05) (92% - 100%)  Telemetry:   CAPILLARY BLOOD GLUCOSE      POCT Blood Glucose.: 150 mg/dL (26 Oct 2023 11:14)  POCT Blood Glucose.: 143 mg/dL (26 Oct 2023 05:02)  POCT Blood Glucose.: 158 mg/dL (25 Oct 2023 23:40)  POCT Blood Glucose.: 108 mg/dL (25 Oct 2023 18:12)    I&O's Summary    25 Oct 2023 07:01  -  26 Oct 2023 07:00  --------------------------------------------------------  IN: 833.8 mL / OUT: 0 mL / NET: 833.8 mL    26 Oct 2023 07:01  -  26 Oct 2023 16:25  --------------------------------------------------------  IN: 993.9 mL / OUT: 2500 mL / NET: -1506.1 mL        PHYSICAL EXAM:  GENERAL: NAD, well-developed  HEAD:  Atraumatic, Normocephalic  EYES: EOMI, PERRLA, conjunctiva and sclera clear  NECK: Supple, No JVD  CHEST/LUNG: Clear to auscultation bilaterally; No wheeze  HEART: Regular rate and rhythm; No murmurs, rubs, or gallops  ABDOMEN: Soft, Nontender, Nondistended; Bowel sounds present  EXTREMITIES:  2+ Peripheral Pulses, No clubbing, cyanosis, or edema  PSYCH: AAOx3  NEUROLOGY: non-focal  SKIN: No rashes or lesions    LABS:                        8.6    16.05 )-----------( 379      ( 26 Oct 2023 02:00 )             28.4     10-26    135  |  101  |  32<H>  ----------------------------<  144<H>  4.3   |  25  |  1.46<H>    Ca    8.8      26 Oct 2023 02:00  Phos  4.6     10-26  Mg     2.10     10-26    TPro  5.7<L>  /  Alb  1.8<L>  /  TBili  <0.2  /  DBili  x   /  AST  22  /  ALT  10  /  AlkPhos  240<H>  10-26          Urinalysis Basic - ( 26 Oct 2023 02:00 )    Color: x / Appearance: x / SG: x / pH: x  Gluc: 144 mg/dL / Ketone: x  / Bili: x / Urobili: x   Blood: x / Protein: x / Nitrite: x   Leuk Esterase: x / RBC: x / WBC x   Sq Epi: x / Non Sq Epi: x / Bacteria: x        RADIOLOGY & ADDITIONAL TESTS:    Imaging Personally Reviewed:    Consultant(s) Notes Reviewed:      Care Discussed with Consultants/Other Providers:

## 2023-10-26 NOTE — PROGRESS NOTE ADULT - ASSESSMENT
IMPRESSION: 73F w/ HTN, DM2, CVA, breast CA-bilateral mastectomy, recurrent aspiration pneumonia/respiratory failure, and CKD, 8/11/23 p/w acute hypercapnic respiratory failure; c/b RUSS    (1)Renal - RUSS on CKD4 ==>now newly ESRD. Last dialyzed Tuesday, due today     (2)Hyponatremia - improved/stable    (3)CV- tenuous hemodynamics such with each passing week we have more difficulty performing HD/achieving UF, persistent issue.     (4)ID- BCx from 10/14/23 NGTD, BC (10/17) NGTD on IV abx     (5)Pulm- trach/vent-dependent    (6)Anemia- hgb low, epo with HD    RECOMMEND:  (1)HD today; pressors and sodium modelling as needed to keep SBP>90; Epogen with HD   (2)Dose new meds for GFR <10/HD.     Nilda Espinoza, HEAVEN  [facility]  [phone]        IMPRESSION: 73F w/ HTN, DM2, CVA, breast CA-bilateral mastectomy, recurrent aspiration pneumonia/respiratory failure, and CKD, 8/11/23 p/w acute hypercapnic respiratory failure; c/b RUSS    (1)Renal - RUSS on CKD4 ==>now newly ESRD. Last dialyzed Tuesday, due today     (2)Hyponatremia - improved/stable    (3)CV- tenuous hemodynamics such with each passing week we have more difficulty performing HD/achieving UF, persistent issue.     (4)ID- BCx from 10/14/23 NGTD, BC (10/17) NGTD on IV abx     (5)Pulm- trach/vent-dependent    (6)Anemia- hgb low, epo with HD    RECOMMEND:  (1)HD today; pressors and sodium modelling as needed to keep SBP>90; Epogen with HD   (2)Dose new meds for GFR <10/HD.     Nilda Espinoza DNP  [facility]  [phone]       RENAL ATTENDING NOTE  Patient seen and examined with NP. Agree with assessment and plan as above.  Tolerated HD today with ~2L UF  Family counseled at bedside    Jimmy Colon MD  [facility]  [phone]

## 2023-10-26 NOTE — PROGRESS NOTE ADULT - SUBJECTIVE AND OBJECTIVE BOX
NEPHROLOGY     Patient seen and examined.    MEDICATIONS  (STANDING):  albuterol    0.083% 2.5 milliGRAM(s) Nebulizer every 6 hours  apixaban 5 milliGRAM(s) Oral every 12 hours  artificial tears (preservative free) Ophthalmic Solution 1 Drop(s) Both EYES every 4 hours  atorvastatin 10 milliGRAM(s) Oral at bedtime  aztreonam  IVPB 2000 milliGRAM(s) IV Intermittent <User Schedule>  chlorhexidine 0.12% Liquid 15 milliLiter(s) Oral Mucosa every 12 hours  chlorhexidine 2% Cloths 1 Application(s) Topical daily  dextrose 5%. 1000 milliLiter(s) (50 mL/Hr) IV Continuous <Continuous>  dextrose 5%. 1000 milliLiter(s) (100 mL/Hr) IV Continuous <Continuous>  dextrose 50% Injectable 12.5 Gram(s) IV Push once  dextrose 50% Injectable 25 Gram(s) IV Push once  dextrose 50% Injectable 25 Gram(s) IV Push once  dextrose 50% Injectable 25 Gram(s) IV Push once  droxidopa 600 milliGRAM(s) Oral every 8 hours  epoetin odalis (EPOGEN) Injectable 80337 Unit(s) IV Push <User Schedule>  ferrous    sulfate Liquid 300 milliGRAM(s) Oral daily  glucagon  Injectable 1 milliGRAM(s) IntraMuscular once  insulin lispro (ADMELOG) corrective regimen sliding scale   SubCutaneous every 6 hours  insulin NPH human recombinant 6 Unit(s) SubCutaneous every 6 hours  levETIRAcetam  Solution 1000 milliGRAM(s) Oral daily  levETIRAcetam  Solution 250 milliGRAM(s) Oral <User Schedule>  midodrine. 40 milliGRAM(s) Oral <User Schedule>  norepinephrine Infusion 0.05 MICROgram(s)/kG/Min (6.23 mL/Hr) IV Continuous <Continuous>  pantoprazole  Injectable 40 milliGRAM(s) IV Push two times a day  petrolatum Ophthalmic Ointment 1 Application(s) Both EYES four times a day  sodium chloride 1 Gram(s) Oral two times a day    VITALS:  T(C): , Max: 37.3 (10-25-23 @ 12:00)  T(F): , Max: 99.1 (10-25-23 @ 12:00)  HR: 105 (10-26-23 @ 10:00)  BP: 125/64 (10-26-23 @ 10:00)  BP(mean): 81 (10-26-23 @ 10:00)  RR: 24 (10-26-23 @ 10:00)  SpO2: 100% (10-26-23 @ 10:00)    PHYSICAL EXAM:  Constitutional: trach to vent  HEENT: + tracheostomy, + NGT  Respiratory: Coarse BS  Cardiovascular: tachy   Gastrointestinal: BS+, soft, NT/ND  Extremities: ++ peripheral edema  Neurological: UTO  : No Wheeler  Vascular Access: ilene (accessed)     LABS:                        8.6    16.05 )-----------( 379      ( 26 Oct 2023 02:00 )             28.4     10-26    135  |  101  |  32<H>  ----------------------------<  144<H>  4.3   |  25  |  1.46<H>    Ca    8.8      26 Oct 2023 02:00  Phos  4.6     10-26  Mg     2.10     10-26    TPro  5.7<L>  /  Alb  1.8<L>  /  TBili  <0.2  /  DBili  x   /  AST  22  /  ALT  10  /  AlkPhos  240<H>  10-26     NEPHROLOGY     Patient seen and examined with spouse at bedside, trach to vent, off levo at present, in no acute distress.     MEDICATIONS  (STANDING):  albuterol    0.083% 2.5 milliGRAM(s) Nebulizer every 6 hours  apixaban 5 milliGRAM(s) Oral every 12 hours  artificial tears (preservative free) Ophthalmic Solution 1 Drop(s) Both EYES every 4 hours  atorvastatin 10 milliGRAM(s) Oral at bedtime  aztreonam  IVPB 2000 milliGRAM(s) IV Intermittent <User Schedule>  chlorhexidine 0.12% Liquid 15 milliLiter(s) Oral Mucosa every 12 hours  chlorhexidine 2% Cloths 1 Application(s) Topical daily  dextrose 5%. 1000 milliLiter(s) (50 mL/Hr) IV Continuous <Continuous>  dextrose 5%. 1000 milliLiter(s) (100 mL/Hr) IV Continuous <Continuous>  dextrose 50% Injectable 12.5 Gram(s) IV Push once  dextrose 50% Injectable 25 Gram(s) IV Push once  dextrose 50% Injectable 25 Gram(s) IV Push once  dextrose 50% Injectable 25 Gram(s) IV Push once  droxidopa 600 milliGRAM(s) Oral every 8 hours  epoetin odalis (EPOGEN) Injectable 49916 Unit(s) IV Push <User Schedule>  ferrous    sulfate Liquid 300 milliGRAM(s) Oral daily  glucagon  Injectable 1 milliGRAM(s) IntraMuscular once  insulin lispro (ADMELOG) corrective regimen sliding scale   SubCutaneous every 6 hours  insulin NPH human recombinant 6 Unit(s) SubCutaneous every 6 hours  levETIRAcetam  Solution 1000 milliGRAM(s) Oral daily  levETIRAcetam  Solution 250 milliGRAM(s) Oral <User Schedule>  midodrine. 40 milliGRAM(s) Oral <User Schedule>  norepinephrine Infusion 0.05 MICROgram(s)/kG/Min (6.23 mL/Hr) IV Continuous <Continuous>  pantoprazole  Injectable 40 milliGRAM(s) IV Push two times a day  petrolatum Ophthalmic Ointment 1 Application(s) Both EYES four times a day  sodium chloride 1 Gram(s) Oral two times a day    VITALS:  T(C): , Max: 37.3 (10-25-23 @ 12:00)  T(F): , Max: 99.1 (10-25-23 @ 12:00)  HR: 105 (10-26-23 @ 10:00)  BP: 125/64 (10-26-23 @ 10:00)  BP(mean): 81 (10-26-23 @ 10:00)  RR: 24 (10-26-23 @ 10:00)  SpO2: 100% (10-26-23 @ 10:00)    PHYSICAL EXAM:  Constitutional: trach to vent  HEENT: + tracheostomy, + NGT  Respiratory: Coarse BS  Cardiovascular: tachy   Gastrointestinal: BS+, soft, NT/ND  Extremities: ++ peripheral edema  Neurological: UTO  : No Wheeler  Vascular Access: ilene (accessed)     LABS:                        8.6    16.05 )-----------( 379      ( 26 Oct 2023 02:00 )             28.4     10-26    135  |  101  |  32<H>  ----------------------------<  144<H>  4.3   |  25  |  1.46<H>    Ca    8.8      26 Oct 2023 02:00  Phos  4.6     10-26  Mg     2.10     10-26    TPro  5.7<L>  /  Alb  1.8<L>  /  TBili  <0.2  /  DBili  x   /  AST  22  /  ALT  10  /  AlkPhos  240<H>  10-26

## 2023-10-26 NOTE — PROGRESS NOTE ADULT - SUBJECTIVE AND OBJECTIVE BOX
Progress Note    08-11-23 (76d)    Patient is a 73y old  Female who presents with a chief complaint of Respiratory distress (25 Oct 2023 15:12)      Subjective / Overnight Events :  - No acute events overnight.  - Pt seen and examined at bedside.     Additional ROS (if any):    MEDICATIONS  (STANDING):  albuterol    0.083% 2.5 milliGRAM(s) Nebulizer every 6 hours  apixaban 5 milliGRAM(s) Oral every 12 hours  artificial tears (preservative free) Ophthalmic Solution 1 Drop(s) Both EYES every 4 hours  atorvastatin 10 milliGRAM(s) Oral at bedtime  aztreonam  IVPB 2000 milliGRAM(s) IV Intermittent <User Schedule>  chlorhexidine 0.12% Liquid 15 milliLiter(s) Oral Mucosa every 12 hours  chlorhexidine 2% Cloths 1 Application(s) Topical daily  dextrose 5%. 1000 milliLiter(s) (100 mL/Hr) IV Continuous <Continuous>  dextrose 5%. 1000 milliLiter(s) (50 mL/Hr) IV Continuous <Continuous>  dextrose 50% Injectable 25 Gram(s) IV Push once  dextrose 50% Injectable 25 Gram(s) IV Push once  dextrose 50% Injectable 25 Gram(s) IV Push once  dextrose 50% Injectable 12.5 Gram(s) IV Push once  droxidopa 600 milliGRAM(s) Oral every 8 hours  epoetin odalis (EPOGEN) Injectable 33384 Unit(s) IV Push <User Schedule>  ferrous    sulfate Liquid 300 milliGRAM(s) Oral daily  glucagon  Injectable 1 milliGRAM(s) IntraMuscular once  insulin lispro (ADMELOG) corrective regimen sliding scale   SubCutaneous every 6 hours  insulin NPH human recombinant 6 Unit(s) SubCutaneous every 6 hours  levETIRAcetam  Solution 1000 milliGRAM(s) Oral daily  levETIRAcetam  Solution 250 milliGRAM(s) Oral <User Schedule>  midodrine. 40 milliGRAM(s) Oral every 8 hours  norepinephrine Infusion 0.05 MICROgram(s)/kG/Min (6.23 mL/Hr) IV Continuous <Continuous>  pantoprazole  Injectable 40 milliGRAM(s) IV Push two times a day  petrolatum Ophthalmic Ointment 1 Application(s) Both EYES four times a day  sodium chloride 1 Gram(s) Oral two times a day    MEDICATIONS  (PRN):  acetaminophen   Oral Liquid .. 650 milliGRAM(s) Oral every 6 hours PRN Temp greater or equal to 38C (100.4F), Mild Pain (1 - 3)  dextrose Oral Gel 15 Gram(s) Oral once PRN Blood Glucose LESS THAN 70 milliGRAM(s)/deciliter  diphenhydrAMINE Elixir 50 milliGRAM(s) Oral once PRN Rash and/or Itching  sodium chloride 0.9% Bolus. 250 milliLiter(s) IV Bolus every 5 minutes PRN SBP LESS THAN or EQUAL to 90 mmHg  sodium chloride 0.9% lock flush 10 milliLiter(s) IV Push every 1 hour PRN Pre/post blood products, medications, blood draw, and to maintain line patency          PHYSICAL EXAM:  Vital Signs Last 24 Hrs  T(C): 37.2 (26 Oct 2023 04:00), Max: 37.3 (25 Oct 2023 08:00)  T(F): 99 (26 Oct 2023 04:00), Max: 99.2 (25 Oct 2023 08:00)  HR: 111 (26 Oct 2023 07:00) (106 - 121)  BP: 116/65 (26 Oct 2023 07:00) (99/76 - 134/59)  BP(mean): 80 (26 Oct 2023 07:00) (42 - 124)  RR: 24 (26 Oct 2023 07:00) (0 - 24)  SpO2: 100% (26 Oct 2023 07:00) (92% - 100%)    Parameters below as of 26 Oct 2023 07:00  Patient On (Oxygen Delivery Method): ventilator    O2 Concentration (%): 35    I&O's Summary    25 Oct 2023 07:01  -  26 Oct 2023 07:00  --------------------------------------------------------  IN: 833.8 mL / OUT: 0 mL / NET: 833.8 mL        General: NAD, non-toxic appearing   HEENT: PERRLA, EOMi, no scleral icterus  CV: RRR, normal S1 and S2, no m/r/g  Lungs: normal respiratory effort. CTAB, no wheezes, rales, or rhonchi  Abd: soft, nontender, nondistended  Ext: no edema, 2+ peripheral pulses   Pysch: AAOx3, appropriate affect   Neuro: grossly non-focal, moving all extremities spontaneously   Skin: no rashes or lesions     LABS:  CAPILLARY BLOOD GLUCOSE      POCT Blood Glucose.: 143 mg/dL (26 Oct 2023 05:02)  POCT Blood Glucose.: 158 mg/dL (25 Oct 2023 23:40)  POCT Blood Glucose.: 108 mg/dL (25 Oct 2023 18:12)  POCT Blood Glucose.: 100 mg/dL (25 Oct 2023 11:20)                              8.6    16.05 )-----------( 379      ( 26 Oct 2023 02:00 )             28.4       WBC Trend: 16.05<--, 19.33<--, 16.76<--  Hb Trend: 8.6<--, 9.0<--, 8.6<--, 8.0<--, 8.1<--    10-26    135  |  101  |  32<H>  ----------------------------<  144<H>  4.3   |  25  |  1.46<H>    Ca    8.8      26 Oct 2023 02:00  Phos  4.6     10-26  Mg     2.10     10-26    TPro  5.7<L>  /  Alb  1.8<L>  /  TBili  <0.2  /  DBili  x   /  AST  22  /  ALT  10  /  AlkPhos  240<H>  10-26          Urinalysis Basic - ( 26 Oct 2023 02:00 )    Color: x / Appearance: x / SG: x / pH: x  Gluc: 144 mg/dL / Ketone: x  / Bili: x / Urobili: x   Blood: x / Protein: x / Nitrite: x   Leuk Esterase: x / RBC: x / WBC x   Sq Epi: x / Non Sq Epi: x / Bacteria: x            RADIOLOGY & ADDITIONAL TESTS: Reviewed Progress Note    08-11-23 (76d)    Patient is a 73y old  Female who presents with a chief complaint of Respiratory distress (25 Oct 2023 15:12)      Subjective / Overnight Events :  - No acute events overnight.  - Pt seen and examined at bedside.     Additional ROS (if any):    MEDICATIONS  (STANDING):  albuterol    0.083% 2.5 milliGRAM(s) Nebulizer every 6 hours  apixaban 5 milliGRAM(s) Oral every 12 hours  artificial tears (preservative free) Ophthalmic Solution 1 Drop(s) Both EYES every 4 hours  atorvastatin 10 milliGRAM(s) Oral at bedtime  aztreonam  IVPB 2000 milliGRAM(s) IV Intermittent <User Schedule>  chlorhexidine 0.12% Liquid 15 milliLiter(s) Oral Mucosa every 12 hours  chlorhexidine 2% Cloths 1 Application(s) Topical daily  dextrose 5%. 1000 milliLiter(s) (100 mL/Hr) IV Continuous <Continuous>  dextrose 5%. 1000 milliLiter(s) (50 mL/Hr) IV Continuous <Continuous>  dextrose 50% Injectable 25 Gram(s) IV Push once  dextrose 50% Injectable 25 Gram(s) IV Push once  dextrose 50% Injectable 25 Gram(s) IV Push once  dextrose 50% Injectable 12.5 Gram(s) IV Push once  droxidopa 600 milliGRAM(s) Oral every 8 hours  epoetin odalis (EPOGEN) Injectable 72148 Unit(s) IV Push <User Schedule>  ferrous    sulfate Liquid 300 milliGRAM(s) Oral daily  glucagon  Injectable 1 milliGRAM(s) IntraMuscular once  insulin lispro (ADMELOG) corrective regimen sliding scale   SubCutaneous every 6 hours  insulin NPH human recombinant 6 Unit(s) SubCutaneous every 6 hours  levETIRAcetam  Solution 1000 milliGRAM(s) Oral daily  levETIRAcetam  Solution 250 milliGRAM(s) Oral <User Schedule>  midodrine. 40 milliGRAM(s) Oral every 8 hours  norepinephrine Infusion 0.05 MICROgram(s)/kG/Min (6.23 mL/Hr) IV Continuous <Continuous>  pantoprazole  Injectable 40 milliGRAM(s) IV Push two times a day  petrolatum Ophthalmic Ointment 1 Application(s) Both EYES four times a day  sodium chloride 1 Gram(s) Oral two times a day    MEDICATIONS  (PRN):  acetaminophen   Oral Liquid .. 650 milliGRAM(s) Oral every 6 hours PRN Temp greater or equal to 38C (100.4F), Mild Pain (1 - 3)  dextrose Oral Gel 15 Gram(s) Oral once PRN Blood Glucose LESS THAN 70 milliGRAM(s)/deciliter  diphenhydrAMINE Elixir 50 milliGRAM(s) Oral once PRN Rash and/or Itching  sodium chloride 0.9% Bolus. 250 milliLiter(s) IV Bolus every 5 minutes PRN SBP LESS THAN or EQUAL to 90 mmHg  sodium chloride 0.9% lock flush 10 milliLiter(s) IV Push every 1 hour PRN Pre/post blood products, medications, blood draw, and to maintain line patency          PHYSICAL EXAM:  Vital Signs Last 24 Hrs  T(C): 37.2 (26 Oct 2023 04:00), Max: 37.3 (25 Oct 2023 08:00)  T(F): 99 (26 Oct 2023 04:00), Max: 99.2 (25 Oct 2023 08:00)  HR: 111 (26 Oct 2023 07:00) (106 - 121)  BP: 116/65 (26 Oct 2023 07:00) (99/76 - 134/59)  BP(mean): 80 (26 Oct 2023 07:00) (42 - 124)  RR: 24 (26 Oct 2023 07:00) (0 - 24)  SpO2: 100% (26 Oct 2023 07:00) (92% - 100%)    Parameters below as of 26 Oct 2023 07:00  Patient On (Oxygen Delivery Method): ventilator    O2 Concentration (%): 35    I&O's Summary    25 Oct 2023 07:01  -  26 Oct 2023 07:00  --------------------------------------------------------  IN: 833.8 mL / OUT: 0 mL / NET: 833.8 mL        General: intubated, minimal response   HEENT: PERRLA, EOMi, no scleral icterus  CV: +tachycardia   Lungs: +coarse breath sounds bilaterally   Abd: soft, nontender, nondistended  Ext: 2+ pitting edema bilaterally   Skin: +diffuse rash     LABS:  CAPILLARY BLOOD GLUCOSE      POCT Blood Glucose.: 143 mg/dL (26 Oct 2023 05:02)  POCT Blood Glucose.: 158 mg/dL (25 Oct 2023 23:40)  POCT Blood Glucose.: 108 mg/dL (25 Oct 2023 18:12)  POCT Blood Glucose.: 100 mg/dL (25 Oct 2023 11:20)                              8.6    16.05 )-----------( 379      ( 26 Oct 2023 02:00 )             28.4       WBC Trend: 16.05<--, 19.33<--, 16.76<--  Hb Trend: 8.6<--, 9.0<--, 8.6<--, 8.0<--, 8.1<--    10-26    135  |  101  |  32<H>  ----------------------------<  144<H>  4.3   |  25  |  1.46<H>    Ca    8.8      26 Oct 2023 02:00  Phos  4.6     10-26  Mg     2.10     10-26    TPro  5.7<L>  /  Alb  1.8<L>  /  TBili  <0.2  /  DBili  x   /  AST  22  /  ALT  10  /  AlkPhos  240<H>  10-26          Urinalysis Basic - ( 26 Oct 2023 02:00 )    Color: x / Appearance: x / SG: x / pH: x  Gluc: 144 mg/dL / Ketone: x  / Bili: x / Urobili: x   Blood: x / Protein: x / Nitrite: x   Leuk Esterase: x / RBC: x / WBC x   Sq Epi: x / Non Sq Epi: x / Bacteria: x            RADIOLOGY & ADDITIONAL TESTS: Reviewed

## 2023-10-27 NOTE — PROGRESS NOTE ADULT - ASSESSMENT
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       NEUROLOGY  #AMS  Multiple etiologies including active infection vs CVA.   - MR head performed w/ new infarct and old infarcts, EEG without seizure events but with high foci potential   - c/w ASA, lipitor  - c/w keppra    - will obtain another MRI     CARDIOVASCULAR  # Septic vs vasoplegic shock   Etiology likely septic iso active infection. 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   - resolved    RESPIRATORY  # AHRF second to volume overload   - CKD vs HFpEF w/ hypercarbia 2/2 volume overload requiring intubation, trach, and HD initiation  - Continue on albuterol and chest PT  - Continue volume removal with HD      #tracheomalacia   - CT CHEST (9/8) with tracheomalacia, BL pleural effusions and continued consolidations     #mechanical ventilation   - PC 24/35/8/40     GI  # UGIB   - Continue on PPI BID    # Dysphagia   - NGT-TF     RENAL  # ESRD  - HD MWF TIW with midodrine and droxidopa  - ICU for vasopressor assisted volume removal, cap to 1 pressor and not offering CRRT due to comorbidities and prognosis   - will titrate droxidopa to maintain off pressors during dialysis       INFECTIOUS DISEASE  # ESBL ECOLI and MSSA VAP c/b MSSA bacteremia   - hx of MSSA bacteremia, ESBL E. Coli sputum Cx, BAL w/ MSSA  - treated with abx     # Proteus and  PSA VAP/ Trachitis   - Overall difficulty with ABX tailoring given allergic rxns and resistant organisms  - aztreonam day 10, finished course    # MAC   - outpatient treatment    HEME  # Anemia second to GIB vs renal disease   - received transfusions during stay  - management as per renal, PPI    VASCULAR   # LLE POP DVT   - on Eliquis    # HD access  - TR[facility] T[facility] (9/27 - 10/21)  - TRISHA taylor replaced for HD     ONC   # Hx of Breast CA   - Patient dx in 2018 and s/p BL mastectomy (radical on right) and chemo and RT.     ENDOCRINE  # IDDM2   - Continued on NPH with ISS, however noted with hypoglycemic episodes and NPH dc'ed.    - Finger sticks trending up off NPH.   - Continue on NPH 6U with moderate ISS.   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       NEUROLOGY  #AMS  Multiple etiologies including active infection vs CVA.   - MR head performed w/ new infarct and old infarcts, EEG without seizure events but with high foci potential   - c/w ASA, lipitor  - c/w keppra    - will obtain another MRI     CARDIOVASCULAR  # Septic vs vasoplegic shock   Etiology likely septic iso active infection. 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   - resolved    RESPIRATORY  # AHRF second to volume overload   - CKD vs HFpEF w/ hypercarbia 2/2 volume overload requiring intubation, trach, and HD initiation  - Continue on albuterol and chest PT  - Continue volume removal with HD      #tracheomalacia   - CT CHEST (9/8) with tracheomalacia, BL pleural effusions and continued consolidations     #mechanical ventilation   - PC 24/35/8/40     GI  # UGIB   - Continue on PPI BID    # Dysphagia   - NGT-TF     RENAL  # ESRD  - HD MWF TIW with midodrine and droxidopa  - ICU for vasopressor assisted volume removal, cap to 1 pressor and not offering CRRT due to comorbidities and prognosis   - will titrate droxidopa to maintain off pressors during dialysis       INFECTIOUS DISEASE  # ESBL ECOLI and MSSA VAP c/b MSSA bacteremia   - hx of MSSA bacteremia, ESBL E. Coli sputum Cx, BAL w/ MSSA  - treated with abx     # Proteus and  PSA VAP/ Trachitis   - Overall difficulty with ABX tailoring given allergic rxns and resistant organisms  - aztreonam day 10, finished course    # MAC   - outpatient treatment    HEME  # Anemia second to GIB vs renal disease   - received transfusions during stay  - management as per renal, PPI    VASCULAR   # LLE POP DVT   - on Eliquis    # HD access  - TRISHA TAYL[facility] (9/27 - 10/21)  - [facility] [employer] replaced for HD     ONC   # Hx of Breast CA   - Patient dx in 2018 and s/p BL mastectomy (radical on right) and chemo and RT.     ENDOCRINE  # IDDM2   - Continued on NPH with ISS, however noted with hypoglycemic episodes and NPH dc'ed.    - Finger sticks trending up off NPH.   - Continue on NPH 6U with moderate ISS.   - Adjust as need     SKIN  # Drug Eruption   - treated    ETHICS/ GOC    - Attempted palliative discussion however family not interested and wishes for FULL CODE  - Family continues to want everything done despite HD failure on multiple oral pressor, now requiring IV pressors as well at baseline

## 2023-10-27 NOTE — PROGRESS NOTE ADULT - ASSESSMENT
74 y/o F well [facility] to me [facility]. she was admitted at [facility] 7/12-7/22 w aspiration PNA, was treated w CEFEPIME, developed an allergic rash,  dCHF, + MAC on AFB culture, had been progressively getting more and more lethargic and dyspneic at home since DC.   In  am of 8/11/23  ptn presented with respiratory distress w hypoxia and hypercarbia requiring intubation 2/2 volume overload +/- Asp PNA      Neuro   not responsive  Baseline MS AOx3, aphasic   - h/o CVA , on aspirin and statin . resumed w feeding tube, ASA resumed 8/26  - eeg  2/2 tremors, no sz focus, on KEPPRA  - less responsive in the past few days  - MRI 8/17:  new R cerebellar infarct, old left PCA/Occipital infarct. probably embolic in nature, did not tolerate full AC in the past, STEPHANIE is neg , no shunts observed  - ptn is poorly reactive, rpt scan done, no further CVA seen.     Cardiac   cardiology following  CHFpEF   TTE 7/2023 with EF 59%, with severe LVH and diastolic dysfunction   on Levo drip 2/2 hypotension and Midodrine and Droxidopa    Pulmonary   Acute hypercapnic and hypoxemic respiratory failure   prolonged intubation, now  trache to  vent, has copious secretions      Renal   on HD via L IJ Shiley, tunneled catheter when clinically stable  HD MWF, midodrine assisted, PUF in between HD days, unable to remove proper volume 2/2 hypotension.   permacath when clinically stable      GI  NPO  on tube feeds  coffee ground emesis x 1 8/24, melena overnight 8/31, s/p 1UPRBC, EGD/Colonoscopy: erosive gastropathy, esophagisits, on colonoscopy old blood seen no active bleeding, poor prep  family to decide re PEG placement    Endocrine  on Insulin: NPH, Admelog    ID   complicated infectious hospital course  treated for MSSA bacteremia, aspiration PNA.  sputum + for pseudomonas, ptn was initially on zosyn but was allergic, then was switched to aztreonam   Aztreonam was switched to polymyxin for a possible allergy but ptn didnt have a reaction to aztreonam, she had a reaction to Zosyn.   spike temps again while off Abx, Aztreonam resumed 10/17, completing a 10 day course 10/27      ID course complicated with multiple ABx allergies  also treated for Left O. 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 and she remains full code at present

## 2023-10-27 NOTE — PROGRESS NOTE ADULT - ASSESSMENT
IMPRESSION: 73F w/ HTN, DM2, CVA, breast CA-bilateral mastectomy, recurrent aspiration pneumonia/respiratory failure, and CKD, 8/11/23 p/w acute hypercapnic respiratory failure; c/b RUSS    (1)Renal - RUSS on CKD4 ==>now newly ESRD. Last dialyzed yesterday, due tomorrow    (2)Hyponatremia - improved/stable    (3)CV- tenuous hemodynamics such with each passing week we have more difficulty performing HD/achieving UF, persistent issue.     (4)ID- BCx from 10/14/23 NGTD, BC (10/17) NGTD on IV abx     (5)Pulm- trach/vent-dependent    (6)Anemia- hgb low, epo with HD    RECOMMEND:  (1)HD tomorrow; pressors and sodium modelling as needed to keep SBP>90; Epogen with HD   (2)Dose new meds for GFR <10/HD.     Nilda Espinoaz, HEAVEN  [facility]  [phone]        IMPRESSION: 73F w/ HTN, DM2, CVA, breast CA-bilateral mastectomy, recurrent aspiration pneumonia/respiratory failure, and CKD, 8/11/23 p/w acute hypercapnic respiratory failure; c/b RUSS    (1)Renal - RUSS on CKD4 ==>now newly ESRD. Last dialyzed yesterday, due tomorrow    (2)Hyponatremia - improved/stable    (3)CV- tenuous hemodynamics such with each passing week we have more difficulty performing HD/achieving UF, persistent issue.     (4)ID- BCx from 10/14/23 NGTD, BC (10/17) NGTD on IV abx     (5)Pulm- trach/vent-dependent    (6)Anemia- hgb low, epo with HD    RECOMMEND:  (1)HD tomorrow; pressors and sodium modelling as needed to keep SBP>90; Epogen with HD   (2)Dose new meds for GFR <10/HD.     Nilda Espinoza DNP  [facility]  [phone]     RENAL ATTENDING NOTE  Patient seen and examined with NP. Agree with assessment and plan as above.  Will attempt to achieve 2L UF again tomorrow    Jimmy Colon MD  [facility]  [phone]

## 2023-10-27 NOTE — PROGRESS NOTE ADULT - SUBJECTIVE AND OBJECTIVE BOX
Patient is a 73y old  Female who presents with a chief complaint of Respiratory distress (27 Oct 2023 13:14)      SUBJECTIVE / OVERNIGHT EVENTS: 2L removed yest in HD, remains on Levo, remains on Aztreonam. today day 10    MEDICATIONS  (STANDING):  albuterol    0.083% 2.5 milliGRAM(s) Nebulizer every 6 hours  apixaban 5 milliGRAM(s) Oral every 12 hours  artificial tears (preservative free) Ophthalmic Solution 1 Drop(s) Both EYES every 4 hours  atorvastatin 10 milliGRAM(s) Oral at bedtime  aztreonam  IVPB 2000 milliGRAM(s) IV Intermittent <User Schedule>  chlorhexidine 0.12% Liquid 15 milliLiter(s) Oral Mucosa every 12 hours  chlorhexidine 2% Cloths 1 Application(s) Topical daily  dextrose 5%. 1000 milliLiter(s) (100 mL/Hr) IV Continuous <Continuous>  dextrose 5%. 1000 milliLiter(s) (50 mL/Hr) IV Continuous <Continuous>  dextrose 50% Injectable 25 Gram(s) IV Push once  dextrose 50% Injectable 25 Gram(s) IV Push once  dextrose 50% Injectable 25 Gram(s) IV Push once  dextrose 50% Injectable 12.5 Gram(s) IV Push once  droxidopa 600 milliGRAM(s) Oral every 8 hours  epoetin odalis (EPOGEN) Injectable 56073 Unit(s) IV Push <User Schedule>  ferrous    sulfate Liquid 300 milliGRAM(s) Oral daily  glucagon  Injectable 1 milliGRAM(s) IntraMuscular once  insulin lispro (ADMELOG) corrective regimen sliding scale   SubCutaneous every 6 hours  insulin NPH human recombinant 6 Unit(s) SubCutaneous every 6 hours  levETIRAcetam  Solution 1000 milliGRAM(s) Oral daily  levETIRAcetam  Solution 250 milliGRAM(s) Oral <User Schedule>  midodrine. 40 milliGRAM(s) Oral <User Schedule>  norepinephrine Infusion 0.05 MICROgram(s)/kG/Min (6.23 mL/Hr) IV Continuous <Continuous>  pantoprazole  Injectable 40 milliGRAM(s) IV Push two times a day  petrolatum Ophthalmic Ointment 1 Application(s) Both EYES four times a day  sodium chloride 1 Gram(s) Oral two times a day    MEDICATIONS  (PRN):  acetaminophen   Oral Liquid .. 650 milliGRAM(s) Oral every 6 hours PRN Temp greater or equal to 38C (100.4F), Mild Pain (1 - 3)  dextrose Oral Gel 15 Gram(s) Oral once PRN Blood Glucose LESS THAN 70 milliGRAM(s)/deciliter  diphenhydrAMINE Elixir 50 milliGRAM(s) Oral once PRN Rash and/or Itching  droxidopa 200 milliGRAM(s) Oral <User Schedule> PRN Prior to hemodialysis  sodium chloride 0.9% Bolus. 250 milliLiter(s) IV Bolus every 5 minutes PRN SBP LESS THAN or EQUAL to 90 mmHg  sodium chloride 0.9% lock flush 10 milliLiter(s) IV Push every 1 hour PRN Pre/post blood products, medications, blood draw, and to maintain line patency      Vital Signs Last 24 Hrs  T(F): 99.2 (10-27-23 @ 16:00), Max: 99.2 (10-27-23 @ 16:00)  HR: 116 (10-27-23 @ 19:04) (100 - 123)  BP: 103/52 (10-27-23 @ 18:00) (91/63 - 124/25)  RR: 24 (10-27-23 @ 18:00) (18 - 24)  SpO2: 100% (10-27-23 @ 19:04) (96% - 100%)  Telemetry:   CAPILLARY BLOOD GLUCOSE      POCT Blood Glucose.: 169 mg/dL (27 Oct 2023 18:24)  POCT Blood Glucose.: 108 mg/dL (27 Oct 2023 11:44)  POCT Blood Glucose.: 109 mg/dL (27 Oct 2023 05:24)  POCT Blood Glucose.: 138 mg/dL (26 Oct 2023 23:46)    I&O's Summary    26 Oct 2023 07:01  -  27 Oct 2023 07:00  --------------------------------------------------------  IN: 1433.9 mL / OUT: 2500 mL / NET: -1066.1 mL    27 Oct 2023 07:01  -  27 Oct 2023 19:46  --------------------------------------------------------  IN: 86 mL / OUT: 0 mL / NET: 86 mL        PHYSICAL EXAM:  GENERAL: NAD, well-developed  HEAD:  Atraumatic, Normocephalic  EYES: EOMI, PERRLA, conjunctiva and sclera clear  NECK: Supple, No JVD  CHEST/LUNG: Clear to auscultation bilaterally; No wheeze  HEART: Regular rate and rhythm; No murmurs, rubs, or gallops  ABDOMEN: Soft, Nontender, Nondistended; Bowel sounds present  EXTREMITIES:  2+ Peripheral Pulses, No clubbing, cyanosis, or edema  PSYCH: AAOx3  NEUROLOGY: non-focal  SKIN: No rashes or lesions    LABS:                        8.9    18.49 )-----------( 356      ( 27 Oct 2023 01:00 )             27.7     10-27    135  |  102  |  25<H>  ----------------------------<  113<H>  3.9   |  26  |  1.10    Ca    8.6      27 Oct 2023 01:00  Phos  4.6     10-26  Mg     2.10     10-26    TPro  5.8<L>  /  Alb  1.9<L>  /  TBili  <0.2  /  DBili  x   /  AST  <5  /  ALT  12  /  AlkPhos  230<H>  10-27          Urinalysis Basic - ( 27 Oct 2023 01:00 )    Color: x / Appearance: x / SG: x / pH: x  Gluc: 113 mg/dL / Ketone: x  / Bili: x / Urobili: x   Blood: x / Protein: x / Nitrite: x   Leuk Esterase: x / RBC: x / WBC x   Sq Epi: x / Non Sq Epi: x / Bacteria: x        RADIOLOGY & ADDITIONAL TESTS:    Imaging Personally Reviewed:    Consultant(s) Notes Reviewed:      Care Discussed with Consultants/Other Providers:

## 2023-10-27 NOTE — PROGRESS NOTE ADULT - ATTENDING COMMENTS
73 F with CKD5, wheel chair bound, prior CVA here with prolonged hospital course now trach, recurrent infections, now dialysis dependent here with shock of unclear etiology    HD catheter removed and leukocytosis improving, inserted again on 10/24 for HD   off eliquis, on heparin gtt and tolerating, switch back to eliquis  on full vent support, PS trials as tolerated  complete aztreonam for pseudomonas pneumonia as per ID  needs vasopressors for HD, c/w increased midodrine and droxidopa, will give extra droxidopa pre-HD  recheck cortisol  ongoing GOC given poor prognosis -  understands prognosis is poor but is still hopeful

## 2023-10-27 NOTE — PROGRESS NOTE ADULT - SUBJECTIVE AND OBJECTIVE BOX
Subjective: Patient seen and examined. No new events except as noted.   remains in ICU       REVIEW OF SYSTEMS:    Unable to obtain        MEDICATIONS:  MEDICATIONS  (STANDING):  albuterol    0.083% 2.5 milliGRAM(s) Nebulizer every 6 hours  apixaban 5 milliGRAM(s) Oral every 12 hours  artificial tears (preservative free) Ophthalmic Solution 1 Drop(s) Both EYES every 4 hours  atorvastatin 10 milliGRAM(s) Oral at bedtime  aztreonam  IVPB 2000 milliGRAM(s) IV Intermittent <User Schedule>  chlorhexidine 0.12% Liquid 15 milliLiter(s) Oral Mucosa every 12 hours  chlorhexidine 2% Cloths 1 Application(s) Topical daily  dextrose 5%. 1000 milliLiter(s) (100 mL/Hr) IV Continuous <Continuous>  dextrose 5%. 1000 milliLiter(s) (50 mL/Hr) IV Continuous <Continuous>  dextrose 50% Injectable 25 Gram(s) IV Push once  dextrose 50% Injectable 12.5 Gram(s) IV Push once  dextrose 50% Injectable 25 Gram(s) IV Push once  dextrose 50% Injectable 25 Gram(s) IV Push once  droxidopa 600 milliGRAM(s) Oral every 8 hours  epoetin odalis (EPOGEN) Injectable 04181 Unit(s) IV Push <User Schedule>  ferrous    sulfate Liquid 300 milliGRAM(s) Oral daily  glucagon  Injectable 1 milliGRAM(s) IntraMuscular once  insulin lispro (ADMELOG) corrective regimen sliding scale   SubCutaneous every 6 hours  insulin NPH human recombinant 6 Unit(s) SubCutaneous every 6 hours  levETIRAcetam  Solution 1000 milliGRAM(s) Oral daily  levETIRAcetam  Solution 250 milliGRAM(s) Oral <User Schedule>  midodrine. 40 milliGRAM(s) Oral <User Schedule>  norepinephrine Infusion 0.05 MICROgram(s)/kG/Min (6.23 mL/Hr) IV Continuous <Continuous>  pantoprazole  Injectable 40 milliGRAM(s) IV Push two times a day  petrolatum Ophthalmic Ointment 1 Application(s) Both EYES four times a day  sodium chloride 1 Gram(s) Oral two times a day      PHYSICAL EXAM:  T(C): 37.2 (10-27-23 @ 08:00), Max: 37.4 (10-26-23 @ 16:00)  HR: 116 (10-27-23 @ 09:00) (100 - 137)  BP: 102/68 (10-27-23 @ 09:00) (44/35 - 147/77)  RR: 18 (10-27-23 @ 09:00) (18 - 24)  SpO2: 99% (10-27-23 @ 09:00) (95% - 100%)  Wt(kg): --  I&O's Summary    26 Oct 2023 07:01  -  27 Oct 2023 07:00  --------------------------------------------------------  IN: 1333.9 mL / OUT: 2500 mL / NET: -1166.1 mL    27 Oct 2023 07:01  -  27 Oct 2023 10:43  --------------------------------------------------------  IN: 86 mL / OUT: 0 mL / NET: 86 mL        Appearance: NAD, +trach  HEENT: dry oral mucosa  Lymphatic: No lymphadenopathy  Cardiovascular: Normal S1 S2, No JVD, No murmurs, No edema  Respiratory: Decreased BS, + trach to vent	  Neuro: opens eyes  Gastrointestinal: Soft, Non-tender, + BS, + NGT  Skin: No rashes, No ecchymoses, No cyanosis	  Extremities: No strength/ROM 2/2 sedation, + BL LE edema  Vascular: Peripheral pulses palpable 2+ bilaterally  Anasarca   Mode: AC/ CMV (Assist Control/ Continuous Mandatory Ventilation), RR (machine): 24, FiO2: 35, PEEP: 8, ITime: 0.8, MAP: 19, PC: 35, PIP: 44      LABS:    CARDIAC MARKERS:                                8.9    18.49 )-----------( 356      ( 27 Oct 2023 01:00 )             27.7     10-27    135  |  102  |  25<H>  ----------------------------<  113<H>  3.9   |  26  |  1.10    Ca    8.6      27 Oct 2023 01:00  Phos  4.6     10-26  Mg     2.10     10-26    TPro  5.8<L>  /  Alb  1.9<L>  /  TBili  <0.2  /  DBili  x   /  AST  <5  /  ALT  12  /  AlkPhos  230<H>  10-27    TELEMETRY: SR 	    ECG:  	  RADIOLOGY:   DIAGNOSTIC TESTING:  [ ] Echocardiogram:  [ ]  Catheterization:  [ ] Stress Test:    OTHER:

## 2023-10-27 NOTE — PROGRESS NOTE ADULT - SUBJECTIVE AND OBJECTIVE BOX
NEPHROLOGY     Patient seen and examined with spouse at bedside, trach to paula, remains on levo gtt, HD yesterday removed 2L.     MEDICATIONS  (STANDING):  albuterol    0.083% 2.5 milliGRAM(s) Nebulizer every 6 hours  apixaban 5 milliGRAM(s) Oral every 12 hours  artificial tears (preservative free) Ophthalmic Solution 1 Drop(s) Both EYES every 4 hours  atorvastatin 10 milliGRAM(s) Oral at bedtime  aztreonam  IVPB 2000 milliGRAM(s) IV Intermittent <User Schedule>  chlorhexidine 0.12% Liquid 15 milliLiter(s) Oral Mucosa every 12 hours  chlorhexidine 2% Cloths 1 Application(s) Topical daily  dextrose 5%. 1000 milliLiter(s) (100 mL/Hr) IV Continuous <Continuous>  dextrose 5%. 1000 milliLiter(s) (50 mL/Hr) IV Continuous <Continuous>  dextrose 50% Injectable 12.5 Gram(s) IV Push once  dextrose 50% Injectable 25 Gram(s) IV Push once  dextrose 50% Injectable 25 Gram(s) IV Push once  dextrose 50% Injectable 25 Gram(s) IV Push once  droxidopa 600 milliGRAM(s) Oral every 8 hours  epoetin odalis (EPOGEN) Injectable 39803 Unit(s) IV Push <User Schedule>  ferrous    sulfate Liquid 300 milliGRAM(s) Oral daily  glucagon  Injectable 1 milliGRAM(s) IntraMuscular once  insulin lispro (ADMELOG) corrective regimen sliding scale   SubCutaneous every 6 hours  insulin NPH human recombinant 6 Unit(s) SubCutaneous every 6 hours  levETIRAcetam  Solution 1000 milliGRAM(s) Oral daily  levETIRAcetam  Solution 250 milliGRAM(s) Oral <User Schedule>  midodrine. 40 milliGRAM(s) Oral <User Schedule>  norepinephrine Infusion 0.05 MICROgram(s)/kG/Min (6.23 mL/Hr) IV Continuous <Continuous>  pantoprazole  Injectable 40 milliGRAM(s) IV Push two times a day  petrolatum Ophthalmic Ointment 1 Application(s) Both EYES four times a day  sodium chloride 1 Gram(s) Oral two times a day    VITALS:  T(C): , Max: 37.4 (10-26-23 @ 16:00)  T(F): , Max: 99.4 (10-26-23 @ 16:00)  HR: 118 (10-27-23 @ 11:27)  BP: 119/63 (10-27-23 @ 11:00)  BP(mean): 78 (10-27-23 @ 11:00)  RR: 24 (10-27-23 @ 11:00)  SpO2: 100% (10-27-23 @ 11:27)    PHYSICAL EXAM:  Constitutional: trach to vent  HEENT: + tracheostomy, + NGT  Respiratory: Coarse BS  Cardiovascular: tachy   Gastrointestinal: BS+, soft, NT/ND  Extremities: ++ peripheral edema  Neurological: UTO  : No Wheeler  Vascular Access: [facility]    LABS:                        8.9    18.49 )-----------( 356      ( 27 Oct 2023 01:00 )             27.7     10-27    135  |  102  |  25<H>  ----------------------------<  113<H>  3.9   |  26  |  1.10    Ca    8.6      27 Oct 2023 01:00  Phos  4.6     10-26  Mg     2.10     10-26    TPro  5.8<L>  /  Alb  1.9<L>  /  TBili  <0.2  /  DBili  x   /  AST  <5  /  ALT  12  /  AlkPhos  230<H>  10-27

## 2023-10-27 NOTE — PROGRESS NOTE ADULT - SUBJECTIVE AND OBJECTIVE BOX
S:  Pt seen and examined. Still requiring pressors       Medications: MEDICATIONS  (STANDING):  albuterol    0.083% 2.5 milliGRAM(s) Nebulizer every 6 hours  apixaban 5 milliGRAM(s) Oral every 12 hours  artificial tears (preservative free) Ophthalmic Solution 1 Drop(s) Both EYES every 4 hours  atorvastatin 10 milliGRAM(s) Oral at bedtime  aztreonam  IVPB 2000 milliGRAM(s) IV Intermittent <User Schedule>  chlorhexidine 0.12% Liquid 15 milliLiter(s) Oral Mucosa every 12 hours  chlorhexidine 2% Cloths 1 Application(s) Topical daily  dextrose 5%. 1000 milliLiter(s) (50 mL/Hr) IV Continuous <Continuous>  dextrose 5%. 1000 milliLiter(s) (100 mL/Hr) IV Continuous <Continuous>  dextrose 50% Injectable 12.5 Gram(s) IV Push once  dextrose 50% Injectable 25 Gram(s) IV Push once  dextrose 50% Injectable 25 Gram(s) IV Push once  dextrose 50% Injectable 25 Gram(s) IV Push once  droxidopa 600 milliGRAM(s) Oral every 8 hours  epoetin odalis (EPOGEN) Injectable 52094 Unit(s) IV Push <User Schedule>  ferrous    sulfate Liquid 300 milliGRAM(s) Oral daily  glucagon  Injectable 1 milliGRAM(s) IntraMuscular once  insulin lispro (ADMELOG) corrective regimen sliding scale   SubCutaneous every 6 hours  insulin NPH human recombinant 6 Unit(s) SubCutaneous every 6 hours  levETIRAcetam  Solution 250 milliGRAM(s) Oral <User Schedule>  levETIRAcetam  Solution 1000 milliGRAM(s) Oral daily  midodrine. 40 milliGRAM(s) Oral <User Schedule>  norepinephrine Infusion 0.05 MICROgram(s)/kG/Min (6.23 mL/Hr) IV Continuous <Continuous>  pantoprazole  Injectable 40 milliGRAM(s) IV Push two times a day  petrolatum Ophthalmic Ointment 1 Application(s) Both EYES four times a day  sodium chloride 1 Gram(s) Oral two times a day    MEDICATIONS  (PRN):  acetaminophen   Oral Liquid .. 650 milliGRAM(s) Oral every 6 hours PRN Temp greater or equal to 38C (100.4F), Mild Pain (1 - 3)  dextrose Oral Gel 15 Gram(s) Oral once PRN Blood Glucose LESS THAN 70 milliGRAM(s)/deciliter  diphenhydrAMINE Elixir 50 milliGRAM(s) Oral once PRN Rash and/or Itching  droxidopa 200 milliGRAM(s) Oral <User Schedule> PRN Prior to hemodialysis  sodium chloride 0.9% Bolus. 250 milliLiter(s) IV Bolus every 5 minutes PRN SBP LESS THAN or EQUAL to 90 mmHg  sodium chloride 0.9% lock flush 10 milliLiter(s) IV Push every 1 hour PRN Pre/post blood products, medications, blood draw, and to maintain line patency       Vitals:  Vital Signs Last 24 Hrs  T(C): 36.7 (27 Oct 2023 13:00), Max: 37.4 (26 Oct 2023 16:00)  T(F): 98 (27 Oct 2023 13:00), Max: 99.4 (26 Oct 2023 16:00)  HR: 122 (27 Oct 2023 13:00) (100 - 137)  BP: 124/25 (27 Oct 2023 13:00) (46/36 - 147/77)  BP(mean): 50 (27 Oct 2023 13:00) (41 - 97)  RR: 21 (27 Oct 2023 13:00) (18 - 24)  SpO2: 98% (27 Oct 2023 13:00) (96% - 100%)    Parameters below as of 27 Oct 2023 08:00  Patient On (Oxygen Delivery Method): ventilator          Neurological Exam:  Mental Status: Eyes closed, minimal spont eye opening off sedation. Does not follow commands,  + trach to vent and NGT   Cranial Nerves: PERRL slightly sluggish, L subconjunctival hemorrhage improved. R facial twitching noted by mouth - ? suppressible. occasional head dystonia  Motor: Moves upper extremities spontaneously R >L, tremor R > L  no movement noted in lowers  Sensation: WD to noxious x4    I personally reviewed the below data/images/labs:    LABS:                          8.6    16.05 )-----------( 379      ( 26 Oct 2023 02:00 )             28.4     10-26    135  |  101  |  32<H>  ----------------------------<  144<H>  4.3   |  25  |  1.46<H>    Ca    8.8      26 Oct 2023 02:00  Phos  4.6     10-26  Mg     2.10     10-26    TPro  5.7<L>  /  Alb  1.8<L>  /  TBili  <0.2  /  DBili  x   /  AST  22  /  ALT  10  /  AlkPhos  240<H>  10-26    LIVER FUNCTIONS - ( 26 Oct 2023 02:00 )  Alb: 1.8 g/dL / Pro: 5.7 g/dL / ALK PHOS: 240 U/L / ALT: 10 U/L / AST: 22 U/L / GGT: x             Urinalysis Basic - ( 26 Oct 2023 02:00 )    Color: x / Appearance: x / SG: x / pH: x  Gluc: 144 mg/dL / Ketone: x  / Bili: x / Urobili: x   Blood: x / Protein: x / Nitrite: x   Leuk Esterase: x / RBC: x / WBC x   Sq Epi: x / Non Sq Epi: x / Bacteria: x          < from: CT Head No Cont (08.15.23 @ 17:20) >    ACC: [id number] EXAM:  CT BRAIN   ORDERED BY: MINAL SAM     PROCEDURE DATE:  08/15/2023          INTERPRETATION:  Clinical indication: Change in neuro exam.    Multiple axial sections were performed from base to vertex without   contrast enhancement. Coronal and sagittal reconstructions were   reformatted well.    This exam is compared prior head CT performed on August 11, 2023    Parenchymal volume loss and chronic microvessel ischemic changes are   again seen.    Abnormal low-attenuation involving the left occipital cortical   subcortical region is again seen. 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    No evidence of acute hemorrhage mass or mass effect is seen.    Evaluation of the osseous structures with appropriate window demonstrates   sclerotic changes about the left mastoid region which appears stable.   Opacification left middle ear region is again seen.    Patient is status post bilateral cataract surgery.    Impression: Stable exam.    < end of copied text >    vEEG:    Clinical Impression:  - Severe diffuse non-specific cerebral dysfunction  - There were no epileptiform abnormalities or seizures recorded.        CTH 8/11:    VENTRICLES AND SULCI: Age-appropriate involutional change  INTRA-AXIAL:  Old left PCA infarct as seen on the prior unchanged.   Microvascular ischemic changes involving the periventricular and   subcortical white matter as seen previously  EXTRA-AXIAL:  No mass or collection is seen.  VISUALIZED SINUSES:  Clear.  VISUALIZED MASTOIDS: Left mastoid sclerosis  CALVARIUM: Infiltrative appearance tothe calvarium may be indicative of   marrow infiltration on the basis of patient's known diagnosis of breast   cancer. MISCELLANEOUS:  None.    IMPRESSION:  No significant interval change compared with 7/17/2023 in   left PCA infarct which is old. Microvascular ischemic changes involving   the periventricular and subcortical white matter as seen   previously.Questionable lesions at the level of the calvarium related to   possible breast CA. Clinical correlation recommended.    --- End of Report ---      ct< from: CT Head No Cont (09.26.23 @ 21:02) >  IMPRESSION: Vague questionable areas of hypoattenuation within the   bilateral cerebellar hemispheres which may be compatible with   acute/subacute ischemia. Recommend further evaluation with a brain MRI   study, provided there are no MRI contraindications.    No acute intracranial hemorrhage.    Previously seen questionable vague wedge-shaped area of hypoattenuation   in the left parietal lobe with artifactual secondary to motion and volume   averaging with a prominent sulcus.    Chronic left occipital lobe infarct.    < end of copied text >    < from: CT Head No Cont (10.03.23 @ 20:46) >    IMPRESSION:    No acute intracranial hemorrhage or mass effect. Evaluation of the   posterior fossa degraded by streak artifact from dental amalgam.   Lucencies in the bilateral cerebellum may be artifactual.    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    Bilateral middle ear and mastoid effusions which are also seen on prior   exam.    EEG Classification / Summary:  Abnormal EEG in the awake, drowsy states.   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   -No seizures

## 2023-10-27 NOTE — PROGRESS NOTE ADULT - ASSESSMENT
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 While in MICU patient noted with worsening renal failure requiring HD initiation, CGE/ Melena s/p EGD 8/31 found with esophagitis and erosive gastropathy, new right cerebellar infarct and fevers second to ESBL COLI and MSSA VAP, MSSA bacteremia, left ear otitis externa and drug rxn to cephalosporins Course further complicated by prolonged vent time s/p tracheostomy 9/1 and transferred to RCU 9/3 completed by recurrent fevers with high PIP, respiratory acidosis and concern for volume overloaded.   CTH repeated 9/26 for concern for encephalopathy - has known now - chronic bilateral cerebellar infarcts, L PCA infarct. L parietal hypodensity seen on prior CT likely artifactual  Repeat CTH 10/3 - unchanged  EEG 10/5 with L posterocentral/temporal spikes/sharp waves - doubt true focal seizure upon further review of EEG     Impression:   R/o new focal seizure - on Keppra  Suspect underlying movement d/o - PD?  Metabolic encephalopathy related to shock, infection, doubt new stroke  Multiple embolic strokes s/p ILR without events  Dementia   MSSA bacteremia, s/p Daptomycin  Acute popliteal DVT on Eliquis   Anemia     Recommendations   [] care per MICU for persistent hypotension  [] has multiple areas of epileptogenic potential on EEG, no clear seizure correlate seen. On Keppra but no improvement in mental status- new R facial twitching, would get 24h EEG and if negative can d.c  [] consider MRI brain once stable but doubt will change mgmt  [] mgmt of hypotension per picristiany team, remains full code  [] Keppra 500mg BID with extra 250mg after HD on HD days  [] Abx per ID  [] C/w home Atorvastatin 10 mg qhs   [] continue to address GOC with family, poor prognosis    Katty Charles DO  Vascular Neurology  Office [phone]

## 2023-10-27 NOTE — PROGRESS NOTE ADULT - SUBJECTIVE AND OBJECTIVE BOX
Progress Note    08-11-23 (77d)    Patient is a 73y old  Female who presents with a chief complaint of Respiratory distress (26 Oct 2023 16:25)      Subjective / Overnight Events :  - No acute events overnight.  - Pt seen and examined at bedside.     Additional ROS (if any):    MEDICATIONS  (STANDING):  albuterol    0.083% 2.5 milliGRAM(s) Nebulizer every 6 hours  apixaban 5 milliGRAM(s) Oral every 12 hours  artificial tears (preservative free) Ophthalmic Solution 1 Drop(s) Both EYES every 4 hours  atorvastatin 10 milliGRAM(s) Oral at bedtime  aztreonam  IVPB 2000 milliGRAM(s) IV Intermittent <User Schedule>  chlorhexidine 0.12% Liquid 15 milliLiter(s) Oral Mucosa every 12 hours  chlorhexidine 2% Cloths 1 Application(s) Topical daily  dextrose 5%. 1000 milliLiter(s) (100 mL/Hr) IV Continuous <Continuous>  dextrose 5%. 1000 milliLiter(s) (50 mL/Hr) IV Continuous <Continuous>  dextrose 50% Injectable 12.5 Gram(s) IV Push once  dextrose 50% Injectable 25 Gram(s) IV Push once  dextrose 50% Injectable 25 Gram(s) IV Push once  dextrose 50% Injectable 25 Gram(s) IV Push once  droxidopa 600 milliGRAM(s) Oral every 8 hours  epoetin odalis (EPOGEN) Injectable 78008 Unit(s) IV Push <User Schedule>  ferrous    sulfate Liquid 300 milliGRAM(s) Oral daily  glucagon  Injectable 1 milliGRAM(s) IntraMuscular once  insulin lispro (ADMELOG) corrective regimen sliding scale   SubCutaneous every 6 hours  insulin NPH human recombinant 6 Unit(s) SubCutaneous every 6 hours  levETIRAcetam  Solution 1000 milliGRAM(s) Oral daily  levETIRAcetam  Solution 250 milliGRAM(s) Oral <User Schedule>  midodrine. 40 milliGRAM(s) Oral <User Schedule>  norepinephrine Infusion 0.05 MICROgram(s)/kG/Min (6.23 mL/Hr) IV Continuous <Continuous>  pantoprazole  Injectable 40 milliGRAM(s) IV Push two times a day  petrolatum Ophthalmic Ointment 1 Application(s) Both EYES four times a day  sodium chloride 1 Gram(s) Oral two times a day    MEDICATIONS  (PRN):  acetaminophen   Oral Liquid .. 650 milliGRAM(s) Oral every 6 hours PRN Temp greater or equal to 38C (100.4F), Mild Pain (1 - 3)  dextrose Oral Gel 15 Gram(s) Oral once PRN Blood Glucose LESS THAN 70 milliGRAM(s)/deciliter  diphenhydrAMINE Elixir 50 milliGRAM(s) Oral once PRN Rash and/or Itching  droxidopa 200 milliGRAM(s) Oral <User Schedule> PRN Prior to hemodialysis  sodium chloride 0.9% Bolus. 250 milliLiter(s) IV Bolus every 5 minutes PRN SBP LESS THAN or EQUAL to 90 mmHg  sodium chloride 0.9% lock flush 10 milliLiter(s) IV Push every 1 hour PRN Pre/post blood products, medications, blood draw, and to maintain line patency          PHYSICAL EXAM:  Vital Signs Last 24 Hrs  T(C): 37.2 (27 Oct 2023 04:00), Max: 37.4 (26 Oct 2023 16:00)  T(F): 99 (27 Oct 2023 04:00), Max: 99.4 (26 Oct 2023 16:00)  HR: 109 (27 Oct 2023 07:00) (100 - 137)  BP: 103/71 (27 Oct 2023 07:00) (44/35 - 163/76)  BP(mean): 80 (27 Oct 2023 07:00) (35 - 99)  RR: 24 (27 Oct 2023 07:00) (19 - 24)  SpO2: 96% (27 Oct 2023 07:00) (95% - 100%)    Parameters below as of 27 Oct 2023 03:59  Patient On (Oxygen Delivery Method): ventilator        I&O's Summary    26 Oct 2023 07:01  -  27 Oct 2023 07:00  --------------------------------------------------------  IN: 1333.9 mL / OUT: 2500 mL / NET: -1166.1 mL        General: NAD, non-toxic appearing   HEENT: PERRLA, EOMi, no scleral icterus  CV: RRR, normal S1 and S2, no m/r/g  Lungs: normal respiratory effort. CTAB, no wheezes, rales, or rhonchi  Abd: soft, nontender, nondistended  Ext: no edema, 2+ peripheral pulses   Pysch: AAOx3, appropriate affect   Neuro: grossly non-focal, moving all extremities spontaneously   Skin: no rashes or lesions     LABS:  CAPILLARY BLOOD GLUCOSE      POCT Blood Glucose.: 109 mg/dL (27 Oct 2023 05:24)  POCT Blood Glucose.: 138 mg/dL (26 Oct 2023 23:46)  POCT Blood Glucose.: 144 mg/dL (26 Oct 2023 17:16)  POCT Blood Glucose.: 150 mg/dL (26 Oct 2023 11:14)                              8.9    18.49 )-----------( 356      ( 27 Oct 2023 01:00 )             27.7       WBC Trend: 18.49<--, 16.05<--, 19.33<--  Hb Trend: 8.9<--, 8.6<--, 9.0<--, 8.6<--, 8.0<--    10-27    135  |  102  |  25<H>  ----------------------------<  113<H>  3.9   |  26  |  1.10    Ca    8.6      27 Oct 2023 01:00  Phos  4.6     10-26  Mg     2.10     10-26    TPro  5.8<L>  /  Alb  1.9<L>  /  TBili  <0.2  /  DBili  x   /  AST  <5  /  ALT  12  /  AlkPhos  230<H>  10-27          Urinalysis Basic - ( 27 Oct 2023 01:00 )    Color: x / Appearance: x / SG: x / pH: x  Gluc: 113 mg/dL / Ketone: x  / Bili: x / Urobili: x   Blood: x / Protein: x / Nitrite: x   Leuk Esterase: x / RBC: x / WBC x   Sq Epi: x / Non Sq Epi: x / Bacteria: x            RADIOLOGY & ADDITIONAL TESTS: Reviewed Progress Note    08-11-23 (77d)    Patient is a 73y old  Female who presents with a chief complaint of Respiratory distress (26 Oct 2023 16:25)      Subjective / Overnight Events :  - No acute events overnight.  - Pt seen and examined at bedside.     Additional ROS (if any):    MEDICATIONS  (STANDING):  albuterol    0.083% 2.5 milliGRAM(s) Nebulizer every 6 hours  apixaban 5 milliGRAM(s) Oral every 12 hours  artificial tears (preservative free) Ophthalmic Solution 1 Drop(s) Both EYES every 4 hours  atorvastatin 10 milliGRAM(s) Oral at bedtime  aztreonam  IVPB 2000 milliGRAM(s) IV Intermittent <User Schedule>  chlorhexidine 0.12% Liquid 15 milliLiter(s) Oral Mucosa every 12 hours  chlorhexidine 2% Cloths 1 Application(s) Topical daily  dextrose 5%. 1000 milliLiter(s) (100 mL/Hr) IV Continuous <Continuous>  dextrose 5%. 1000 milliLiter(s) (50 mL/Hr) IV Continuous <Continuous>  dextrose 50% Injectable 12.5 Gram(s) IV Push once  dextrose 50% Injectable 25 Gram(s) IV Push once  dextrose 50% Injectable 25 Gram(s) IV Push once  dextrose 50% Injectable 25 Gram(s) IV Push once  droxidopa 600 milliGRAM(s) Oral every 8 hours  epoetin odalis (EPOGEN) Injectable 05535 Unit(s) IV Push <User Schedule>  ferrous    sulfate Liquid 300 milliGRAM(s) Oral daily  glucagon  Injectable 1 milliGRAM(s) IntraMuscular once  insulin lispro (ADMELOG) corrective regimen sliding scale   SubCutaneous every 6 hours  insulin NPH human recombinant 6 Unit(s) SubCutaneous every 6 hours  levETIRAcetam  Solution 1000 milliGRAM(s) Oral daily  levETIRAcetam  Solution 250 milliGRAM(s) Oral <User Schedule>  midodrine. 40 milliGRAM(s) Oral <User Schedule>  norepinephrine Infusion 0.05 MICROgram(s)/kG/Min (6.23 mL/Hr) IV Continuous <Continuous>  pantoprazole  Injectable 40 milliGRAM(s) IV Push two times a day  petrolatum Ophthalmic Ointment 1 Application(s) Both EYES four times a day  sodium chloride 1 Gram(s) Oral two times a day    MEDICATIONS  (PRN):  acetaminophen   Oral Liquid .. 650 milliGRAM(s) Oral every 6 hours PRN Temp greater or equal to 38C (100.4F), Mild Pain (1 - 3)  dextrose Oral Gel 15 Gram(s) Oral once PRN Blood Glucose LESS THAN 70 milliGRAM(s)/deciliter  diphenhydrAMINE Elixir 50 milliGRAM(s) Oral once PRN Rash and/or Itching  droxidopa 200 milliGRAM(s) Oral <User Schedule> PRN Prior to hemodialysis  sodium chloride 0.9% Bolus. 250 milliLiter(s) IV Bolus every 5 minutes PRN SBP LESS THAN or EQUAL to 90 mmHg  sodium chloride 0.9% lock flush 10 milliLiter(s) IV Push every 1 hour PRN Pre/post blood products, medications, blood draw, and to maintain line patency          PHYSICAL EXAM:  Vital Signs Last 24 Hrs  T(C): 37.2 (27 Oct 2023 04:00), Max: 37.4 (26 Oct 2023 16:00)  T(F): 99 (27 Oct 2023 04:00), Max: 99.4 (26 Oct 2023 16:00)  HR: 109 (27 Oct 2023 07:00) (100 - 137)  BP: 103/71 (27 Oct 2023 07:00) (44/35 - 163/76)  BP(mean): 80 (27 Oct 2023 07:00) (35 - 99)  RR: 24 (27 Oct 2023 07:00) (19 - 24)  SpO2: 96% (27 Oct 2023 07:00) (95% - 100%)    Parameters below as of 27 Oct 2023 03:59  Patient On (Oxygen Delivery Method): ventilator        I&O's Summary    26 Oct 2023 07:01  -  27 Oct 2023 07:00  --------------------------------------------------------  IN: 1333.9 mL / OUT: 2500 mL / NET: -1166.1 mL        General: intubated, minimal response   HEENT: PERRLA, EOMi, no scleral icterus  CV: +tachycardia   Lungs: +coarse breath sounds bilaterally   Abd: soft, nontender, nondistended  Ext: 2+ pitting edema bilaterally   Skin: +diffuse rash     LABS:  CAPILLARY BLOOD GLUCOSE      POCT Blood Glucose.: 109 mg/dL (27 Oct 2023 05:24)  POCT Blood Glucose.: 138 mg/dL (26 Oct 2023 23:46)  POCT Blood Glucose.: 144 mg/dL (26 Oct 2023 17:16)  POCT Blood Glucose.: 150 mg/dL (26 Oct 2023 11:14)                              8.9    18.49 )-----------( 356      ( 27 Oct 2023 01:00 )             27.7       WBC Trend: 18.49<--, 16.05<--, 19.33<--  Hb Trend: 8.9<--, 8.6<--, 9.0<--, 8.6<--, 8.0<--    10-27    135  |  102  |  25<H>  ----------------------------<  113<H>  3.9   |  26  |  1.10    Ca    8.6      27 Oct 2023 01:00  Phos  4.6     10-26  Mg     2.10     10-26    TPro  5.8<L>  /  Alb  1.9<L>  /  TBili  <0.2  /  DBili  x   /  AST  <5  /  ALT  12  /  AlkPhos  230<H>  10-27          Urinalysis Basic - ( 27 Oct 2023 01:00 )    Color: x / Appearance: x / SG: x / pH: x  Gluc: 113 mg/dL / Ketone: x  / Bili: x / Urobili: x   Blood: x / Protein: x / Nitrite: x   Leuk Esterase: x / RBC: x / WBC x   Sq Epi: x / Non Sq Epi: x / Bacteria: x            RADIOLOGY & ADDITIONAL TESTS: Reviewed

## 2023-10-28 NOTE — PROGRESS NOTE ADULT - SUBJECTIVE AND OBJECTIVE BOX
Progress Note    08-11-23 (78d)    Patient is a 73y old  Female who presents with a chief complaint of Respiratory distress (27 Oct 2023 19:46)      Subjective / Overnight Events :  - No acute events overnight.  - Pt seen and examined at bedside.     Additional ROS (if any):    MEDICATIONS  (STANDING):  albuterol    0.083% 2.5 milliGRAM(s) Nebulizer every 6 hours  apixaban 5 milliGRAM(s) Oral every 12 hours  artificial tears (preservative free) Ophthalmic Solution 1 Drop(s) Both EYES every 4 hours  atorvastatin 10 milliGRAM(s) Oral at bedtime  chlorhexidine 0.12% Liquid 15 milliLiter(s) Oral Mucosa every 12 hours  chlorhexidine 2% Cloths 1 Application(s) Topical daily  dextrose 5%. 1000 milliLiter(s) (50 mL/Hr) IV Continuous <Continuous>  dextrose 5%. 1000 milliLiter(s) (100 mL/Hr) IV Continuous <Continuous>  dextrose 50% Injectable 12.5 Gram(s) IV Push once  dextrose 50% Injectable 25 Gram(s) IV Push once  dextrose 50% Injectable 25 Gram(s) IV Push once  dextrose 50% Injectable 25 Gram(s) IV Push once  droxidopa 600 milliGRAM(s) Oral every 8 hours  epoetin odalis (EPOGEN) Injectable 74551 Unit(s) IV Push <User Schedule>  ferrous    sulfate Liquid 300 milliGRAM(s) Oral daily  glucagon  Injectable 1 milliGRAM(s) IntraMuscular once  insulin lispro (ADMELOG) corrective regimen sliding scale   SubCutaneous every 6 hours  insulin NPH human recombinant 6 Unit(s) SubCutaneous every 6 hours  levETIRAcetam  Solution 1000 milliGRAM(s) Oral daily  levETIRAcetam  Solution 250 milliGRAM(s) Oral <User Schedule>  midodrine. 40 milliGRAM(s) Oral <User Schedule>  norepinephrine Infusion 0.05 MICROgram(s)/kG/Min (6.23 mL/Hr) IV Continuous <Continuous>  pantoprazole  Injectable 40 milliGRAM(s) IV Push two times a day  petrolatum Ophthalmic Ointment 1 Application(s) Both EYES four times a day  sodium chloride 1 Gram(s) Oral two times a day    MEDICATIONS  (PRN):  acetaminophen   Oral Liquid .. 650 milliGRAM(s) Oral every 6 hours PRN Temp greater or equal to 38C (100.4F), Mild Pain (1 - 3)  dextrose Oral Gel 15 Gram(s) Oral once PRN Blood Glucose LESS THAN 70 milliGRAM(s)/deciliter  diphenhydrAMINE Elixir 50 milliGRAM(s) Oral once PRN Rash and/or Itching  droxidopa 200 milliGRAM(s) Oral <User Schedule> PRN Prior to hemodialysis  sodium chloride 0.9% Bolus. 250 milliLiter(s) IV Bolus every 5 minutes PRN SBP LESS THAN or EQUAL to 90 mmHg  sodium chloride 0.9% lock flush 10 milliLiter(s) IV Push every 1 hour PRN Pre/post blood products, medications, blood draw, and to maintain line patency          PHYSICAL EXAM:  Vital Signs Last 24 Hrs  T(C): 37.1 (28 Oct 2023 00:00), Max: 37.4 (27 Oct 2023 20:00)  T(F): 98.8 (28 Oct 2023 00:00), Max: 99.4 (27 Oct 2023 20:00)  HR: 124 (28 Oct 2023 06:50) (100 - 124)  BP: 99/51 (28 Oct 2023 06:50) (73/56 - 124/25)  BP(mean): 63 (28 Oct 2023 06:00) (43 - 91)  RR: 24 (28 Oct 2023 06:50) (16 - 24)  SpO2: 100% (28 Oct 2023 06:50) (96% - 100%)    Parameters below as of 28 Oct 2023 06:00  Patient On (Oxygen Delivery Method): Press AC Rate 24/PIP45/P+5    O2 Concentration (%): 35    I&O's Summary    27 Oct 2023 07:01  -  28 Oct 2023 07:00  --------------------------------------------------------  IN: 976 mL / OUT: 0 mL / NET: 976 mL        General: NAD, non-toxic appearing   HEENT: PERRLA, EOMi, no scleral icterus  CV: RRR, normal S1 and S2, no m/r/g  Lungs: normal respiratory effort. CTAB, no wheezes, rales, or rhonchi  Abd: soft, nontender, nondistended  Ext: no edema, 2+ peripheral pulses   Pysch: AAOx3, appropriate affect   Neuro: grossly non-focal, moving all extremities spontaneously   Skin: no rashes or lesions     LABS:  CAPILLARY BLOOD GLUCOSE      POCT Blood Glucose.: 141 mg/dL (28 Oct 2023 06:03)  POCT Blood Glucose.: 162 mg/dL (27 Oct 2023 23:55)  POCT Blood Glucose.: 169 mg/dL (27 Oct 2023 18:24)  POCT Blood Glucose.: 108 mg/dL (27 Oct 2023 11:44)                              8.4    15.61 )-----------( 337      ( 28 Oct 2023 03:00 )             26.7       WBC Trend: 15.61<--, 18.49<--, 16.05<--  Hb Trend: 8.4<--, 8.9<--, 8.6<--, 9.0<--, 8.6<--    10-28    135  |  100  |  32<H>  ----------------------------<  131<H>  4.5   |  27  |  1.31<H>    Ca    8.7      28 Oct 2023 03:00  Phos  4.7     10-28  Mg     2.20     10-28    TPro  5.6<L>  /  Alb  1.9<L>  /  TBili  <0.2  /  DBili  x   /  AST  22  /  ALT  11  /  AlkPhos  235<H>  10-28          Urinalysis Basic - ( 28 Oct 2023 03:00 )    Color: x / Appearance: x / SG: x / pH: x  Gluc: 131 mg/dL / Ketone: x  / Bili: x / Urobili: x   Blood: x / Protein: x / Nitrite: x   Leuk Esterase: x / RBC: x / WBC x   Sq Epi: x / Non Sq Epi: x / Bacteria: x            RADIOLOGY & ADDITIONAL TESTS: Reviewed

## 2023-10-28 NOTE — PROGRESS NOTE ADULT - ATTENDING COMMENTS
73 F with CKD5, wheel chair bound, prior CVA here with prolonged hospital course now trach, recurrent infections, now dialysis dependent here with shock of unclear etiology    HD catheter reinserted 10/24 for HD   on eliquis  on full vent support, PS trials as tolerated  complete aztreonam for pseudomonas pneumonia as per ID  needs vasopressors for HD, c/w increased midodrine and droxidopa, will give extra droxidopa pre-HD  cortisol indeterminate  a-line placed 10/28 for labile BP  ongoing GOC given poor long term prognosis

## 2023-10-28 NOTE — PROGRESS NOTE ADULT - SUBJECTIVE AND OBJECTIVE BOX
Subjective: Patient seen and examined. No new events except as noted.   remains in ICU   s/p HD today     REVIEW OF SYSTEMS:  Unable to obtain    MEDICATIONS:  MEDICATIONS  (STANDING):  albuterol    0.083% 2.5 milliGRAM(s) Nebulizer every 6 hours  apixaban 5 milliGRAM(s) Oral every 12 hours  artificial tears (preservative free) Ophthalmic Solution 1 Drop(s) Both EYES every 4 hours  atorvastatin 10 milliGRAM(s) Oral at bedtime  aztreonam  IVPB 2000 milliGRAM(s) IV Intermittent <User Schedule>  chlorhexidine 0.12% Liquid 15 milliLiter(s) Oral Mucosa every 12 hours  chlorhexidine 2% Cloths 1 Application(s) Topical daily  dextrose 5%. 1000 milliLiter(s) (50 mL/Hr) IV Continuous <Continuous>  dextrose 5%. 1000 milliLiter(s) (100 mL/Hr) IV Continuous <Continuous>  dextrose 50% Injectable 12.5 Gram(s) IV Push once  dextrose 50% Injectable 25 Gram(s) IV Push once  dextrose 50% Injectable 25 Gram(s) IV Push once  dextrose 50% Injectable 25 Gram(s) IV Push once  droxidopa 600 milliGRAM(s) Oral every 8 hours  epoetin odalis (EPOGEN) Injectable 05467 Unit(s) IV Push <User Schedule>  ferrous    sulfate Liquid 300 milliGRAM(s) Oral daily  glucagon  Injectable 1 milliGRAM(s) IntraMuscular once  insulin lispro (ADMELOG) corrective regimen sliding scale   SubCutaneous every 6 hours  insulin NPH human recombinant 6 Unit(s) SubCutaneous every 6 hours  levETIRAcetam  Solution 1000 milliGRAM(s) Oral daily  levETIRAcetam  Solution 250 milliGRAM(s) Oral <User Schedule>  midodrine. 40 milliGRAM(s) Oral <User Schedule>  norepinephrine Infusion 0.05 MICROgram(s)/kG/Min (6.23 mL/Hr) IV Continuous <Continuous>  pantoprazole  Injectable 40 milliGRAM(s) IV Push two times a day  petrolatum Ophthalmic Ointment 1 Application(s) Both EYES four times a day  sodium chloride 1 Gram(s) Oral two times a day      PHYSICAL EXAM:  T(C): 37.1 (10-28-23 @ 16:00), Max: 37.4 (10-27-23 @ 20:00)  HR: 112 (10-28-23 @ 19:11) (98 - 142)  BP: 72/49 (10-28-23 @ 19:00) (49/33 - 148/46)  RR: 18 (10-28-23 @ 19:00) (16 - 24)  SpO2: 100% (10-28-23 @ 19:11) (96% - 100%)  Wt(kg): --  I&O's Summary    27 Oct 2023 07:01  -  28 Oct 2023 07:00  --------------------------------------------------------  IN: 976 mL / OUT: 0 mL / NET: 976 mL    28 Oct 2023 07:01  -  28 Oct 2023 19:55  --------------------------------------------------------  IN: 1265 mL / OUT: 2750 mL / NET: -1485 mL            Appearance: NAD, +trach  HEENT: dry oral mucosa  Lymphatic: No lymphadenopathy  Cardiovascular: Normal S1 S2, No JVD, No murmurs, No edema  Respiratory: Decreased BS, + trach to vent	  Neuro: opens eyes  Gastrointestinal: Soft, Non-tender, + BS, + NGT  Skin: No rashes, No ecchymoses, No cyanosis	  Extremities: No strength/ROM 2/2 sedation, + BL LE edema  Vascular: Peripheral pulses palpable 2+ bilaterally  Anasarca   Mode: AC/ CMV (Assist Control/ Continuous Mandatory Ventilation), RR (machine): 24, FiO2: 35, PEEP: 8, ITime: 0.8, MAP: 19, PC: 35, PIP: 44        LABS:    CARDIAC MARKERS:                                8.4    15.61 )-----------( 337      ( 28 Oct 2023 03:00 )             26.7     10-28    135  |  100  |  32<H>  ----------------------------<  131<H>  4.5   |  27  |  1.31<H>    Ca    8.7      28 Oct 2023 03:00  Phos  4.7     10-28  Mg     2.20     10-28    TPro  5.6<L>  /  Alb  1.9<L>  /  TBili  <0.2  /  DBili  x   /  AST  22  /  ALT  11  /  AlkPhos  235<H>  10-28    proBNP:   Lipid Profile:   HgA1c:   TSH:             TELEMETRY: 	 SR   ECG:  	  RADIOLOGY:   DIAGNOSTIC TESTING:  [ ] Echocardiogram:  [ ]  Catheterization:  [ ] Stress Test:    OTHER:

## 2023-10-28 NOTE — PROGRESS NOTE ADULT - ASSESSMENT
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       NEUROLOGY  #AMS  Multiple etiologies including active infection vs CVA.   - MR head performed w/ new infarct and old infarcts, EEG without seizure events but with high foci potential   - c/w ASA, lipitor  - c/w keppra    - will obtain another MRI     CARDIOVASCULAR  # Septic vs vasoplegic shock   Etiology likely septic iso active infection. 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   - resolved    RESPIRATORY  # AHRF second to volume overload   - CKD vs HFpEF w/ hypercarbia 2/2 volume overload requiring intubation, trach, and HD initiation  - Continue on albuterol and chest PT  - Continue volume removal with HD      #tracheomalacia   - CT CHEST (9/8) with tracheomalacia, BL pleural effusions and continued consolidations     #mechanical ventilation   - PC 24/35/8/40     GI  # UGIB   - Continue on PPI BID    # Dysphagia   - NGT-TF     RENAL  # ESRD  - HD MWF TIW with midodrine and droxidopa  - ICU for vasopressor assisted volume removal, cap to 1 pressor and not offering CRRT due to comorbidities and prognosis   - will titrate droxidopa to maintain off pressors during dialysis       INFECTIOUS DISEASE  # ESBL ECOLI and MSSA VAP c/b MSSA bacteremia   - hx of MSSA bacteremia, ESBL E. Coli sputum Cx, BAL w/ MSSA  - treated with abx     # Proteus and  PSA VAP/ Trachitis   - Overall difficulty with ABX tailoring given allergic rxns and resistant organisms  - aztreonam day 10, finished course    # MAC   - outpatient treatment    HEME  # Anemia second to GIB vs renal disease   - received transfusions during stay  - management as per renal, PPI    VASCULAR   # LLE POP DVT   - on Eliquis    # HD access  - TRISHA T[facility]LOR (9/27 - 10/21)  - TRISHA [facility]fabi replaced for HD     ONC   # Hx of Breast CA   - Patient dx in 2018 and s/p BL mastectomy (radical on right) and chemo and RT.     ENDOCRINE  # IDDM2   - Continued on NPH with ISS, however noted with hypoglycemic episodes and NPH dc'ed.    - Finger sticks trending up off NPH.   - Continue on NPH 6U with moderate ISS.   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       NEUROLOGY  #AMS  Multiple etiologies including active infection vs CVA.   - MR head performed w/ new infarct and old infarcts, EEG without seizure events but with high foci potential   - c/w ASA, lipitor  - c/w keppra    - will obtain another MRI     CARDIOVASCULAR  # Septic vs vasoplegic shock   Etiology likely septic iso active infection. 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   - resolved    RESPIRATORY  # AHRF second to volume overload   - CKD vs HFpEF w/ hypercarbia 2/2 volume overload requiring intubation, trach, and HD initiation  - Continue on albuterol and chest PT  - Continue volume removal with HD      #tracheomalacia   - CT CHEST (9/8) with tracheomalacia, BL pleural effusions and continued consolidations     #mechanical ventilation   - PC 24/35/8/40     GI  # UGIB   - Continue on PPI BID    # Dysphagia   - NGT-TF     RENAL  # ESRD  - HD MWF TIW with midodrine and droxidopa  - ICU for vasopressor assisted volume removal, cap to 1 pressor and not offering CRRT due to comorbidities and prognosis   - will titrate droxidopa to maintain off pressors during dialysis       INFECTIOUS DISEASE  # ESBL ECOLI and MSSA VAP c/b MSSA bacteremia   - hx of MSSA bacteremia, ESBL E. Coli sputum Cx, BAL w/ MSSA  - treated with abx   - eosinophilia workup     # Proteus and  PSA VAP/ Trachitis   - Overall difficulty with ABX tailoring given allergic rxns and resistant organisms  - aztreonam day 10, will prolong    # MAC   - outpatient treatment    HEME  # Anemia second to GIB vs renal disease   - received transfusions during stay  - management as per renal, PPI    VASCULAR   # LLE POP DVT   - on Eliquis    # HD access  - TRISHA TAYL[facility] (9/27 - 10/21)  - TR[facility]HA [employer] replaced for HD     ONC   # Hx of Breast CA   - Patient dx in 2018 and s/p BL mastectomy (radical on right) and chemo and RT.     ENDOCRINE  # IDDM2   - Continued on NPH with ISS, however noted with hypoglycemic episodes and NPH dc'ed.    - Finger sticks trending up off NPH.   - Continue on NPH 6U with moderate ISS.   - Adjust as need     SKIN  # Drug Eruption   - treated    ETHICS/ GOC    - Attempted palliative discussion however family not interested and wishes for FULL CODE  - Family continues to want everything done despite HD failure on multiple oral pressor, now requiring IV pressors as well at baseline

## 2023-10-28 NOTE — PROGRESS NOTE ADULT - SUBJECTIVE AND OBJECTIVE BOX
Patient is a 73y old  Female who presents with a chief complaint of Respiratory distress (28 Oct 2023 19:55)      SUBJECTIVE / OVERNIGHT EVENTS: droxidopa and midodrine doses raised in order to weane off Levo, remains on Aztreonam    MEDICATIONS  (STANDING):  albuterol    0.083% 2.5 milliGRAM(s) Nebulizer every 6 hours  apixaban 5 milliGRAM(s) Oral every 12 hours  artificial tears (preservative free) Ophthalmic Solution 1 Drop(s) Both EYES every 4 hours  atorvastatin 10 milliGRAM(s) Oral at bedtime  aztreonam  IVPB 2000 milliGRAM(s) IV Intermittent <User Schedule>  chlorhexidine 0.12% Liquid 15 milliLiter(s) Oral Mucosa every 12 hours  chlorhexidine 2% Cloths 1 Application(s) Topical daily  dextrose 5%. 1000 milliLiter(s) (50 mL/Hr) IV Continuous <Continuous>  dextrose 5%. 1000 milliLiter(s) (100 mL/Hr) IV Continuous <Continuous>  dextrose 50% Injectable 12.5 Gram(s) IV Push once  dextrose 50% Injectable 25 Gram(s) IV Push once  dextrose 50% Injectable 25 Gram(s) IV Push once  dextrose 50% Injectable 25 Gram(s) IV Push once  droxidopa 600 milliGRAM(s) Oral every 8 hours  epoetin odalis (EPOGEN) Injectable 99538 Unit(s) IV Push <User Schedule>  ferrous    sulfate Liquid 300 milliGRAM(s) Oral daily  glucagon  Injectable 1 milliGRAM(s) IntraMuscular once  insulin lispro (ADMELOG) corrective regimen sliding scale   SubCutaneous every 6 hours  insulin NPH human recombinant 6 Unit(s) SubCutaneous every 6 hours  levETIRAcetam  Solution 1000 milliGRAM(s) Oral daily  levETIRAcetam  Solution 250 milliGRAM(s) Oral <User Schedule>  midodrine. 40 milliGRAM(s) Oral <User Schedule>  norepinephrine Infusion 0.05 MICROgram(s)/kG/Min (6.23 mL/Hr) IV Continuous <Continuous>  pantoprazole  Injectable 40 milliGRAM(s) IV Push two times a day  petrolatum Ophthalmic Ointment 1 Application(s) Both EYES four times a day  sodium chloride 1 Gram(s) Oral two times a day    MEDICATIONS  (PRN):  acetaminophen   Oral Liquid .. 650 milliGRAM(s) Oral every 6 hours PRN Temp greater or equal to 38C (100.4F), Mild Pain (1 - 3)  dextrose Oral Gel 15 Gram(s) Oral once PRN Blood Glucose LESS THAN 70 milliGRAM(s)/deciliter  diphenhydrAMINE Elixir 50 milliGRAM(s) Oral once PRN Rash and/or Itching  droxidopa 200 milliGRAM(s) Oral <User Schedule> PRN Prior to hemodialysis  sodium chloride 0.9% Bolus. 250 milliLiter(s) IV Bolus every 5 minutes PRN SBP LESS THAN or EQUAL to 90 mmHg  sodium chloride 0.9% lock flush 10 milliLiter(s) IV Push every 1 hour PRN Pre/post blood products, medications, blood draw, and to maintain line patency      Vital Signs Last 24 Hrs  T(F): 98.7 (10-28-23 @ 16:00), Max: 99.2 (10-28-23 @ 12:00)  HR: 108 (10-28-23 @ 20:00) (98 - 142)  BP: 87/44 (10-28-23 @ 20:00) (49/33 - 148/46)  RR: 10 (10-28-23 @ 20:00) (10 - 24)  SpO2: 98% (10-28-23 @ 20:00) (96% - 100%)  Telemetry:   CAPILLARY BLOOD GLUCOSE      POCT Blood Glucose.: 114 mg/dL (28 Oct 2023 17:46)  POCT Blood Glucose.: 103 mg/dL (28 Oct 2023 12:17)  POCT Blood Glucose.: 141 mg/dL (28 Oct 2023 06:03)  POCT Blood Glucose.: 162 mg/dL (27 Oct 2023 23:55)    I&O's Summary    27 Oct 2023 07:01  -  28 Oct 2023 07:00  --------------------------------------------------------  IN: 976 mL / OUT: 0 mL / NET: 976 mL    28 Oct 2023 07:01  -  28 Oct 2023 21:14  --------------------------------------------------------  IN: 1265 mL / OUT: 2750 mL / NET: -1485 mL        PHYSICAL EXAM:  GENERAL: NAD, well-developed  HEAD:  Atraumatic, Normocephalic  EYES: EOMI, PERRLA, conjunctiva and sclera clear  NECK: Supple, No JVD  CHEST/LUNG: Clear to auscultation bilaterally; No wheeze  HEART: Regular rate and rhythm; No murmurs, rubs, or gallops  ABDOMEN: Soft, Nontender, Nondistended; Bowel sounds present  EXTREMITIES:  2+ Peripheral Pulses, No clubbing, cyanosis, or edema  PSYCH: AAOx3  NEUROLOGY: non-focal  SKIN: No rashes or lesions    LABS:                        8.4    15.61 )-----------( 337      ( 28 Oct 2023 03:00 )             26.7     10-28    135  |  100  |  32<H>  ----------------------------<  131<H>  4.5   |  27  |  1.31<H>    Ca    8.7      28 Oct 2023 03:00  Phos  4.7     10-28  Mg     2.20     10-28    TPro  5.6<L>  /  Alb  1.9<L>  /  TBili  <0.2  /  DBili  x   /  AST  22  /  ALT  11  /  AlkPhos  235<H>  10-28          Urinalysis Basic - ( 28 Oct 2023 03:00 )    Color: x / Appearance: x / SG: x / pH: x  Gluc: 131 mg/dL / Ketone: x  / Bili: x / Urobili: x   Blood: x / Protein: x / Nitrite: x   Leuk Esterase: x / RBC: x / WBC x   Sq Epi: x / Non Sq Epi: x / Bacteria: x        RADIOLOGY & ADDITIONAL TESTS:    Imaging Personally Reviewed:    Consultant(s) Notes Reviewed:      Care Discussed with Consultants/Other Providers:

## 2023-10-28 NOTE — PROGRESS NOTE ADULT - ASSESSMENT
74 y/o F well known to me from my \[facility] [place] [facility]\"" [facility] [place] [facility]. she was admitted at [facility] 7/12-7/22 w aspiration PNA, was treated w CEFEPIME, developed an allergic rash,  dCHF, + MAC on AFB culture, had been progressively getting more and more lethargic and dyspneic at home since DC.   In  am of 8/11/23  ptn presented with respiratory distress w hypoxia and hypercarbia requiring intubation 2/2 volume overload +/- Asp PNA      Neuro   not responsive  Baseline MS AOx3, aphasic   - h/o CVA , on aspirin and statin . resumed w feeding tube, ASA resumed 8/26  - eeg  2/2 tremors, no sz focus, on KEPPRA  - less responsive in the past few days  - MRI 8/17:  new R cerebellar infarct, old left PCA/Occipital infarct. probably embolic in nature, did not tolerate full AC in the past, STEPHANIE is neg , no shunts observed  - ptn is poorly reactive, rpt scan done, no further CVA seen.     Cardiac   cardiology following  CHFpEF   TTE 7/2023 with EF 59%, with severe LVH and diastolic dysfunction   on Levo drip 2/2 hypotension   droxidopa and midodrine doses raised in order to weane off Levo    Pulmonary   Acute hypercapnic and hypoxemic respiratory failure   prolonged intubation, now  trache to  vent, has copious secretions      Renal   on HD via L IJ Shiley, tunneled catheter when clinically stable  HD MWF, midodrine assisted, PUF in between HD days, unable to remove proper volume 2/2 hypotension.   permacath when clinically stable      GI  NPO  on tube feeds  coffee ground emesis x 1 8/24, melena overnight 8/31, s/p 1UPRBC, EGD/Colonoscopy: erosive gastropathy, esophagisits, on colonoscopy old blood seen no active bleeding, poor prep  family to decide re PEG placement    Endocrine  on Insulin: NPH, Admelog    ID   complicated infectious hospital course  treated for MSSA bacteremia, aspiration PNA.  sputum + for pseudomonas, ptn was initially on zosyn but was allergic, then was switched to aztreonam   Aztreonam was switched to polymyxin for a possible allergy but ptn didnt have a reaction to aztreonam, she had a reaction to Zosyn.   spike temps again while off Abx, Aztreonam resumed 10/17, cont a prolonged course      ID course complicated with multiple ABx allergies  also treated for Left O. 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 and she remains full code at present

## 2023-10-28 NOTE — PROGRESS NOTE ADULT - ASSESSMENT
seen and examined earlier this AM  trached to vent  s/p HD earlier in AM with 2L net fluid loss  Next HD as Tuesday    updated daughter at bedside    acetaminophen   Oral Liquid .. 650 milliGRAM(s) Oral every 6 hours PRN  albuterol    0.083% 2.5 milliGRAM(s) Nebulizer every 6 hours  apixaban 5 milliGRAM(s) Oral every 12 hours  artificial tears (preservative free) Ophthalmic Solution 1 Drop(s) Both EYES every 4 hours  atorvastatin 10 milliGRAM(s) Oral at bedtime  aztreonam  IVPB 2000 milliGRAM(s) IV Intermittent <User Schedule>  chlorhexidine 0.12% Liquid 15 milliLiter(s) Oral Mucosa every 12 hours  chlorhexidine 2% Cloths 1 Application(s) Topical daily  dextrose 5%. 1000 milliLiter(s) IV Continuous <Continuous>  dextrose 5%. 1000 milliLiter(s) IV Continuous <Continuous>  dextrose 50% Injectable 12.5 Gram(s) IV Push once  dextrose 50% Injectable 25 Gram(s) IV Push once  dextrose 50% Injectable 25 Gram(s) IV Push once  dextrose 50% Injectable 25 Gram(s) IV Push once  dextrose Oral Gel 15 Gram(s) Oral once PRN  diphenhydrAMINE Elixir 50 milliGRAM(s) Oral once PRN  droxidopa 600 milliGRAM(s) Oral every 8 hours  droxidopa 200 milliGRAM(s) Oral <User Schedule> PRN  epoetin odalis (EPOGEN) Injectable 91666 Unit(s) IV Push <User Schedule>  ferrous    sulfate Liquid 300 milliGRAM(s) Oral daily  glucagon  Injectable 1 milliGRAM(s) IntraMuscular once  insulin lispro (ADMELOG) corrective regimen sliding scale   SubCutaneous every 6 hours  insulin NPH human recombinant 6 Unit(s) SubCutaneous every 6 hours  levETIRAcetam  Solution 1000 milliGRAM(s) Oral daily  levETIRAcetam  Solution 250 milliGRAM(s) Oral <User Schedule>  midodrine. 40 milliGRAM(s) Oral <User Schedule>  norepinephrine Infusion 0.05 MICROgram(s)/kG/Min IV Continuous <Continuous>  pantoprazole  Injectable 40 milliGRAM(s) IV Push two times a day  petrolatum Ophthalmic Ointment 1 Application(s) Both EYES four times a day  sodium chloride 1 Gram(s) Oral two times a day  sodium chloride 0.9% Bolus. 250 milliLiter(s) IV Bolus every 5 minutes PRN  sodium chloride 0.9% lock flush 10 milliLiter(s) IV Push every 1 hour PRN      VITAL:  T(C): , Max: 37.4 (10-27-23 @ 20:00)  T(F): , Max: 99.4 (10-27-23 @ 20:00)  HR: 118 (10-28-23 @ 18:00)  BP: 73/34 (10-28-23 @ 18:00)  BP(mean): 43 (10-28-23 @ 18:00)  RR: 24 (10-28-23 @ 18:00)  SpO2: 98% (10-28-23 @ 18:00)  Wt(kg): --    10-27-23 @ 07:01  -  10-28-23 @ 07:00  --------------------------------------------------------  IN: 976 mL / OUT: 0 mL / NET: 976 mL    10-28-23 @ 07:01  -  10-28-23 @ 18:25  --------------------------------------------------------  IN: 1265 mL / OUT: 2750 mL / NET: -1485 mL        PHYSICAL EXAM:  Constitutional: trach to vent  HEENT: + tracheostomy, + NGT  Respiratory: Coarse BS  Cardiovascular: tachy   Gastrointestinal: BS+, soft, NT/ND  Extremities: ++ peripheral edema  Neurological: UTO  : No Wheeler  Vascular Access: shiley     LABS:                          8.4    15.61 )-----------( 337      ( 28 Oct 2023 03:00 )             26.7     Na(135)/K(4.5)/Cl(100)/HCO3(27)/BUN(32)/Cr(1.31)Glu(131)/Ca(8.7)/Mg(2.20)/PO4(4.7)    10-28 @ 03:00  Na(135)/K(3.9)/Cl(102)/HCO3(26)/BUN(25)/Cr(1.10)Glu(113)/Ca(8.6)/Mg(--)/PO4(--)    10-27 @ 01:00  Na(135)/K(4.3)/Cl(101)/HCO3(25)/BUN(32)/Cr(1.46)Glu(144)/Ca(8.8)/Mg(2.10)/PO4(4.6)    10-26 @ 02:00    Urinalysis Basic - ( 28 Oct 2023 03:00 )    Color: x / Appearance: x / SG: x / pH: x  Gluc: 131 mg/dL / Ketone: x  / Bili: x / Urobili: x   Blood: x / Protein: x / Nitrite: x   Leuk Esterase: x / RBC: x / WBC x   Sq Epi: x / Non Sq Epi: x / Bacteria: x            ASSESSMENT/PLAN  IMPRESSION: 73F w/ HTN, DM2, CVA, breast CA-bilateral mastectomy, recurrent aspiration pneumonia/respiratory failure, and CKD, 8/11/23 p/w acute hypercapnic respiratory failure; c/b RUSS    (1)Renal - RUSS on CKD4 ==>now newly ESRD. s/p HD today with 2L net fluid loss, subsequent HD Tuesday    (2)Hyponatremia - resolving    (3)CV- tenuous hemodynamics such with each passing week we have more difficulty performing HD/achieving UF, persistent issue.     (4)ID- BCx from 10/14/23 NGTD, BC (10/17) NGTD on IV abx     (5)Pulm- trach/vent-dependent    (6)Anemia- hgb low, epo with HD    RECOMMEND:  (1)HD Tuesday; pressors and sodium modelling as needed to keep SBP>90; Epogen with HD   (2)Dose new meds for GFR <10/HD.         Nas Corona, NP-BC  [facility]  [phone]

## 2023-10-29 NOTE — PROGRESS NOTE ADULT - SUBJECTIVE AND OBJECTIVE BOX
Josetracee Waters PGY1    INTERVAL HPI/OVERNIGHT EVENTS:    SUBJECTIVE: Patient seen and examined at bedside.     CONSTITUTIONAL: No weakness, fevers or chills  EYES/ENT: No visual changes;  No vertigo or throat pain   NECK: No pain or stiffness  RESPIRATORY: No cough, wheezing, hemoptysis; No shortness of breath  CARDIOVASCULAR: No chest pain or palpitations  GASTROINTESTINAL: No abdominal or epigastric pain. No nausea, vomiting, or hematemesis; No diarrhea or constipation. No melena or hematochezia.  GENITOURINARY: No dysuria, frequency or hematuria  NEUROLOGICAL: No numbness or weakness  SKIN: No itching, rashes    OBJECTIVE:    VITAL SIGNS:  ICU Vital Signs Last 24 Hrs  T(C): 37.6 (29 Oct 2023 04:00), Max: 37.6 (29 Oct 2023 04:00)  T(F): 99.7 (29 Oct 2023 04:00), Max: 99.7 (29 Oct 2023 04:00)  HR: 120 (29 Oct 2023 06:00) (98 - 142)  BP: 90/45 (29 Oct 2023 04:00) (49/37 - 148/46)  BP(mean): 57 (29 Oct 2023 04:00) (41 - 113)  ABP: 111/45 (29 Oct 2023 06:00) (102/40 - 198/92)  ABP(mean): 71 (29 Oct 2023 06:00) (64 - 138)  RR: 21 (29 Oct 2023 06:00) (10 - 25)  SpO2: 100% (29 Oct 2023 06:00) (96% - 100%)    O2 Parameters below as of 29 Oct 2023 06:00  Patient On (Oxygen Delivery Method): ventilator    O2 Concentration (%): 35      Mode: AC/ CMV (Assist Control/ Continuous Mandatory Ventilation), RR (machine): 24, FiO2: 35, PEEP: 8, ITime: 0.8, MAP: 20, PC: 35, PIP: 43    10-28 @ 07:01  -  10-29 @ 07:00  --------------------------------------------------------  IN: 1805 mL / OUT: 2750 mL / NET: -945 mL      CAPILLARY BLOOD GLUCOSE      POCT Blood Glucose.: 151 mg/dL (29 Oct 2023 05:28)      PHYSICAL EXAM:    General: NAD  HEENT: NC/AT; PERRL, clear conjunctiva  Neck: supple  Respiratory: CTA b/l  Cardiovascular: +S1/S2; RRR  Abdomen: soft, NT/ND; +BS x4  Extremities: WWP, 2+ peripheral pulses b/l; no LE edema  Skin: normal color and turgor; no rash  Neurological:    MEDICATIONS:  MEDICATIONS  (STANDING):  albuterol    0.083% 2.5 milliGRAM(s) Nebulizer every 6 hours  apixaban 5 milliGRAM(s) Oral every 12 hours  artificial tears (preservative free) Ophthalmic Solution 1 Drop(s) Both EYES every 4 hours  atorvastatin 10 milliGRAM(s) Oral at bedtime  aztreonam  IVPB 2000 milliGRAM(s) IV Intermittent <User Schedule>  chlorhexidine 0.12% Liquid 15 milliLiter(s) Oral Mucosa every 12 hours  chlorhexidine 2% Cloths 1 Application(s) Topical daily  dextrose 5%. 1000 milliLiter(s) (50 mL/Hr) IV Continuous <Continuous>  dextrose 5%. 1000 milliLiter(s) (100 mL/Hr) IV Continuous <Continuous>  dextrose 50% Injectable 12.5 Gram(s) IV Push once  dextrose 50% Injectable 25 Gram(s) IV Push once  dextrose 50% Injectable 25 Gram(s) IV Push once  dextrose 50% Injectable 25 Gram(s) IV Push once  droxidopa 600 milliGRAM(s) Oral every 8 hours  epoetin odalis (EPOGEN) Injectable 29878 Unit(s) IV Push <User Schedule>  ferrous    sulfate Liquid 300 milliGRAM(s) Oral daily  glucagon  Injectable 1 milliGRAM(s) IntraMuscular once  insulin lispro (ADMELOG) corrective regimen sliding scale   SubCutaneous every 6 hours  insulin NPH human recombinant 6 Unit(s) SubCutaneous every 6 hours  levETIRAcetam  Solution 1000 milliGRAM(s) Oral daily  levETIRAcetam  Solution 250 milliGRAM(s) Oral <User Schedule>  midodrine. 40 milliGRAM(s) Oral <User Schedule>  norepinephrine Infusion 0.05 MICROgram(s)/kG/Min (6.23 mL/Hr) IV Continuous <Continuous>  pantoprazole  Injectable 40 milliGRAM(s) IV Push two times a day  petrolatum Ophthalmic Ointment 1 Application(s) Both EYES four times a day  sodium chloride 1 Gram(s) Oral two times a day    MEDICATIONS  (PRN):  acetaminophen   Oral Liquid .. 650 milliGRAM(s) Oral every 6 hours PRN Temp greater or equal to 38C (100.4F), Mild Pain (1 - 3)  dextrose Oral Gel 15 Gram(s) Oral once PRN Blood Glucose LESS THAN 70 milliGRAM(s)/deciliter  diphenhydrAMINE Elixir 50 milliGRAM(s) Oral once PRN Rash and/or Itching  droxidopa 200 milliGRAM(s) Oral <User Schedule> PRN Prior to hemodialysis  sodium chloride 0.9% Bolus. 250 milliLiter(s) IV Bolus every 5 minutes PRN SBP LESS THAN or EQUAL to 90 mmHg  sodium chloride 0.9% lock flush 10 milliLiter(s) IV Push every 1 hour PRN Pre/post blood products, medications, blood draw, and to maintain line patency      ALLERGIES:  Allergies    isoniazid (Rash)  nafcillin (Unknown)  hydrALAZINE (Rash)  vitamin E (Short breath; Urticaria; Hives)  doxycycline (Rash)  cefepime (Rash)  NIFEdipine (Urticaria; Hives)  Zosyn (Rash)    Intolerances        LABS:                        7.5    11.80 )-----------( 301      ( 29 Oct 2023 03:51 )             23.8     10-29    137  |  101  |  25<H>  ----------------------------<  138<H>  4.1   |  25  |  1.12    Ca    8.5      29 Oct 2023 00:29  Phos  3.9     10-29  Mg     2.10     10-29    TPro  5.6<L>  /  Alb  1.9<L>  /  TBili  <0.2  /  DBili  x   /  AST  20  /  ALT  10  /  AlkPhos  213<H>  10-29    PTT - ( 29 Oct 2023 00:29 )  PTT:33.1 sec  Urinalysis Basic - ( 29 Oct 2023 00:29 )    Color: x / Appearance: x / SG: x / pH: x  Gluc: 138 mg/dL / Ketone: x  / Bili: x / Urobili: x   Blood: x / Protein: x / Nitrite: x   Leuk Esterase: x / RBC: x / WBC x   Sq Epi: x / Non Sq Epi: x / Bacteria: x        RADIOLOGY & ADDITIONAL TESTS: Reviewed. Jose Waters PGY1    INTERVAL HPI/OVERNIGHT EVENTS:    SUBJECTIVE: Patient seen and examined at bedside. no overnight events. Off pressor, sedation, not waking up.    limited ROM     OBJECTIVE:    VITAL SIGNS:  ICU Vital Signs Last 24 Hrs  T(C): 37.6 (29 Oct 2023 04:00), Max: 37.6 (29 Oct 2023 04:00)  T(F): 99.7 (29 Oct 2023 04:00), Max: 99.7 (29 Oct 2023 04:00)  HR: 120 (29 Oct 2023 06:00) (98 - 142)  BP: 90/45 (29 Oct 2023 04:00) (49/37 - 148/46)  BP(mean): 57 (29 Oct 2023 04:00) (41 - 113)  ABP: 111/45 (29 Oct 2023 06:00) (102/40 - 198/92)  ABP(mean): 71 (29 Oct 2023 06:00) (64 - 138)  RR: 21 (29 Oct 2023 06:00) (10 - 25)  SpO2: 100% (29 Oct 2023 06:00) (96% - 100%)    O2 Parameters below as of 29 Oct 2023 06:00  Patient On (Oxygen Delivery Method): ventilator    O2 Concentration (%): 35      Mode: AC/ CMV (Assist Control/ Continuous Mandatory Ventilation), RR (machine): 24, FiO2: 35, PEEP: 8, ITime: 0.8, MAP: 20, PC: 35, PIP: 43    10-28 @ 07:01  -  10-29 @ 07:00  --------------------------------------------------------  IN: 1805 mL / OUT: 2750 mL / NET: -945 mL      CAPILLARY BLOOD GLUCOSE      POCT Blood Glucose.: 151 mg/dL (29 Oct 2023 05:28)      PHYSICAL EXAM:    General: NAD  HEENT: NC/AT; PERRL, clear conjunctiva  Neck: supple  Respiratory: CTA b/l  Cardiovascular: +S1/S2; RRR  Abdomen: soft, NT/ND; +BS x4  Extremities: WWP, 2+ peripheral pulses b/l; diffusely anasarca   Skin: normal color and turgor; no rash  Neurological: AO*0    MEDICATIONS:  MEDICATIONS  (STANDING):  albuterol    0.083% 2.5 milliGRAM(s) Nebulizer every 6 hours  apixaban 5 milliGRAM(s) Oral every 12 hours  artificial tears (preservative free) Ophthalmic Solution 1 Drop(s) Both EYES every 4 hours  atorvastatin 10 milliGRAM(s) Oral at bedtime  aztreonam  IVPB 2000 milliGRAM(s) IV Intermittent <User Schedule>  chlorhexidine 0.12% Liquid 15 milliLiter(s) Oral Mucosa every 12 hours  chlorhexidine 2% Cloths 1 Application(s) Topical daily  dextrose 5%. 1000 milliLiter(s) (50 mL/Hr) IV Continuous <Continuous>  dextrose 5%. 1000 milliLiter(s) (100 mL/Hr) IV Continuous <Continuous>  dextrose 50% Injectable 12.5 Gram(s) IV Push once  dextrose 50% Injectable 25 Gram(s) IV Push once  dextrose 50% Injectable 25 Gram(s) IV Push once  dextrose 50% Injectable 25 Gram(s) IV Push once  droxidopa 600 milliGRAM(s) Oral every 8 hours  epoetin odalis (EPOGEN) Injectable 99065 Unit(s) IV Push <User Schedule>  ferrous    sulfate Liquid 300 milliGRAM(s) Oral daily  glucagon  Injectable 1 milliGRAM(s) IntraMuscular once  insulin lispro (ADMELOG) corrective regimen sliding scale   SubCutaneous every 6 hours  insulin NPH human recombinant 6 Unit(s) SubCutaneous every 6 hours  levETIRAcetam  Solution 1000 milliGRAM(s) Oral daily  levETIRAcetam  Solution 250 milliGRAM(s) Oral <User Schedule>  midodrine. 40 milliGRAM(s) Oral <User Schedule>  norepinephrine Infusion 0.05 MICROgram(s)/kG/Min (6.23 mL/Hr) IV Continuous <Continuous>  pantoprazole  Injectable 40 milliGRAM(s) IV Push two times a day  petrolatum Ophthalmic Ointment 1 Application(s) Both EYES four times a day  sodium chloride 1 Gram(s) Oral two times a day    MEDICATIONS  (PRN):  acetaminophen   Oral Liquid .. 650 milliGRAM(s) Oral every 6 hours PRN Temp greater or equal to 38C (100.4F), Mild Pain (1 - 3)  dextrose Oral Gel 15 Gram(s) Oral once PRN Blood Glucose LESS THAN 70 milliGRAM(s)/deciliter  diphenhydrAMINE Elixir 50 milliGRAM(s) Oral once PRN Rash and/or Itching  droxidopa 200 milliGRAM(s) Oral <User Schedule> PRN Prior to hemodialysis  sodium chloride 0.9% Bolus. 250 milliLiter(s) IV Bolus every 5 minutes PRN SBP LESS THAN or EQUAL to 90 mmHg  sodium chloride 0.9% lock flush 10 milliLiter(s) IV Push every 1 hour PRN Pre/post blood products, medications, blood draw, and to maintain line patency      ALLERGIES:  Allergies    isoniazid (Rash)  nafcillin (Unknown)  hydrALAZINE (Rash)  vitamin E (Short breath; Urticaria; Hives)  doxycycline (Rash)  cefepime (Rash)  NIFEdipine (Urticaria; Hives)  Zosyn (Rash)    Intolerances        LABS:                        7.5    11.80 )-----------( 301      ( 29 Oct 2023 03:51 )             23.8     10-29    137  |  101  |  25<H>  ----------------------------<  138<H>  4.1   |  25  |  1.12    Ca    8.5      29 Oct 2023 00:29  Phos  3.9     10-29  Mg     2.10     10-29    TPro  5.6<L>  /  Alb  1.9<L>  /  TBili  <0.2  /  DBili  x   /  AST  20  /  ALT  10  /  AlkPhos  213<H>  10-29    PTT - ( 29 Oct 2023 00:29 )  PTT:33.1 sec  Urinalysis Basic - ( 29 Oct 2023 00:29 )    Color: x / Appearance: x / SG: x / pH: x  Gluc: 138 mg/dL / Ketone: x  / Bili: x / Urobili: x   Blood: x / Protein: x / Nitrite: x   Leuk Esterase: x / RBC: x / WBC x   Sq Epi: x / Non Sq Epi: x / Bacteria: x        RADIOLOGY & ADDITIONAL TESTS: Reviewed.

## 2023-10-29 NOTE — PROGRESS NOTE ADULT - SUBJECTIVE AND OBJECTIVE BOX
Subjective: Patient seen and examined. No new events except as noted.   remains in ICU     REVIEW OF SYSTEMS:  Unable to obtain       MEDICATIONS:  MEDICATIONS  (STANDING):  albuterol    0.083% 2.5 milliGRAM(s) Nebulizer every 6 hours  apixaban 5 milliGRAM(s) Oral every 12 hours  artificial tears (preservative free) Ophthalmic Solution 1 Drop(s) Both EYES every 4 hours  atorvastatin 10 milliGRAM(s) Oral at bedtime  aztreonam  IVPB 2000 milliGRAM(s) IV Intermittent <User Schedule>  chlorhexidine 0.12% Liquid 15 milliLiter(s) Oral Mucosa every 12 hours  chlorhexidine 2% Cloths 1 Application(s) Topical daily  dextrose 5%. 1000 milliLiter(s) (100 mL/Hr) IV Continuous <Continuous>  dextrose 5%. 1000 milliLiter(s) (50 mL/Hr) IV Continuous <Continuous>  dextrose 50% Injectable 25 Gram(s) IV Push once  dextrose 50% Injectable 25 Gram(s) IV Push once  dextrose 50% Injectable 12.5 Gram(s) IV Push once  dextrose 50% Injectable 25 Gram(s) IV Push once  droxidopa 600 milliGRAM(s) Oral every 8 hours  epoetin odalis (EPOGEN) Injectable 99556 Unit(s) IV Push <User Schedule>  ferrous    sulfate Liquid 300 milliGRAM(s) Oral daily  glucagon  Injectable 1 milliGRAM(s) IntraMuscular once  insulin lispro (ADMELOG) corrective regimen sliding scale   SubCutaneous every 6 hours  insulin NPH human recombinant 6 Unit(s) SubCutaneous every 6 hours  levETIRAcetam  Solution 1000 milliGRAM(s) Oral daily  levETIRAcetam  Solution 250 milliGRAM(s) Oral <User Schedule>  midodrine. 40 milliGRAM(s) Oral <User Schedule>  norepinephrine Infusion 0.05 MICROgram(s)/kG/Min (6.23 mL/Hr) IV Continuous <Continuous>  pantoprazole  Injectable 40 milliGRAM(s) IV Push two times a day  petrolatum Ophthalmic Ointment 1 Application(s) Both EYES four times a day  sodium chloride 1 Gram(s) Oral two times a day      PHYSICAL EXAM:  T(C): 37.6 (10-29-23 @ 04:00), Max: 37.6 (10-29-23 @ 04:00)  HR: 103 (10-29-23 @ 08:07) (98 - 142)  BP: 90/45 (10-29-23 @ 04:00) (49/37 - 148/46)  RR: 21 (10-29-23 @ 06:00) (10 - 25)  SpO2: 100% (10-29-23 @ 08:07) (96% - 100%)  Wt(kg): --  I&O's Summary    28 Oct 2023 07:01  -  29 Oct 2023 07:00  --------------------------------------------------------  IN: 1805 mL / OUT: 2750 mL / NET: -945 mL              Appearance: NAD, +trach  HEENT: dry oral mucosa  Lymphatic: No lymphadenopathy  Cardiovascular: Normal S1 S2, No JVD, No murmurs, No edema  Respiratory: Decreased BS, + trach to vent	  Neuro: opens eyes  Gastrointestinal: Soft, Non-tender, + BS, + NGT  Skin: No rashes, No ecchymoses, No cyanosis	  Extremities: No strength/ROM 2/2 sedation, + BL LE edema  Vascular: Peripheral pulses palpable 2+ bilaterally  Anasarca   Mode: AC/ CMV (Assist Control/ Continuous Mandatory Ventilation), RR (machine): 24, FiO2: 35, PEEP: 8, ITime: 0.8, MAP: 19, PC: 35, PIP: 44        LABS:    CARDIAC MARKERS:                                7.5    11.80 )-----------( 301      ( 29 Oct 2023 03:51 )             23.8     10-29    137  |  101  |  25<H>  ----------------------------<  138<H>  4.1   |  25  |  1.12    Ca    8.5      29 Oct 2023 00:29  Phos  3.9     10-29  Mg     2.10     10-29    TPro  5.6<L>  /  Alb  1.9<L>  /  TBili  <0.2  /  DBili  x   /  AST  20  /  ALT  10  /  AlkPhos  213<H>  10-29          TELEMETRY: 	SR    ECG:  	  RADIOLOGY:   DIAGNOSTIC TESTING:  [ ] Echocardiogram:  [ ]  Catheterization:  [ ] Stress Test:    OTHER:

## 2023-10-29 NOTE — PROGRESS NOTE ADULT - SUBJECTIVE AND OBJECTIVE BOX
Patient is a 73y old  Female who presents with a chief complaint of Respiratory distress (29 Oct 2023 09:28)      SUBJECTIVE / OVERNIGHT EVENTS: on midodrine 40 mg qid, droxidopa 600 tid, additional 200 mgg prior to HD, on Levo drip, off azactam, remains full code, unresponsive, trache to vent    MEDICATIONS  (STANDING):  albuterol    0.083% 2.5 milliGRAM(s) Nebulizer every 6 hours  apixaban 5 milliGRAM(s) Oral every 12 hours  artificial tears (preservative free) Ophthalmic Solution 1 Drop(s) Both EYES every 4 hours  atorvastatin 10 milliGRAM(s) Oral at bedtime  chlorhexidine 0.12% Liquid 15 milliLiter(s) Oral Mucosa every 12 hours  chlorhexidine 2% Cloths 1 Application(s) Topical daily  dextrose 5%. 1000 milliLiter(s) (100 mL/Hr) IV Continuous <Continuous>  dextrose 5%. 1000 milliLiter(s) (50 mL/Hr) IV Continuous <Continuous>  dextrose 50% Injectable 25 Gram(s) IV Push once  dextrose 50% Injectable 12.5 Gram(s) IV Push once  dextrose 50% Injectable 25 Gram(s) IV Push once  dextrose 50% Injectable 25 Gram(s) IV Push once  droxidopa 600 milliGRAM(s) Oral every 8 hours  epoetin odalis (EPOGEN) Injectable 03829 Unit(s) IV Push <User Schedule>  ferrous    sulfate Liquid 300 milliGRAM(s) Oral daily  glucagon  Injectable 1 milliGRAM(s) IntraMuscular once  insulin lispro (ADMELOG) corrective regimen sliding scale   SubCutaneous every 6 hours  insulin NPH human recombinant 6 Unit(s) SubCutaneous every 6 hours  levETIRAcetam  Solution 1000 milliGRAM(s) Oral daily  levETIRAcetam  Solution 250 milliGRAM(s) Oral <User Schedule>  midodrine. 40 milliGRAM(s) Oral <User Schedule>  norepinephrine Infusion 0.05 MICROgram(s)/kG/Min (6.23 mL/Hr) IV Continuous <Continuous>  pantoprazole  Injectable 40 milliGRAM(s) IV Push two times a day  petrolatum Ophthalmic Ointment 1 Application(s) Both EYES four times a day  sodium chloride 1 Gram(s) Oral two times a day    MEDICATIONS  (PRN):  acetaminophen   Oral Liquid .. 650 milliGRAM(s) Oral every 6 hours PRN Temp greater or equal to 38C (100.4F), Mild Pain (1 - 3)  dextrose Oral Gel 15 Gram(s) Oral once PRN Blood Glucose LESS THAN 70 milliGRAM(s)/deciliter  diphenhydrAMINE Elixir 50 milliGRAM(s) Oral once PRN Rash and/or Itching  droxidopa 200 milliGRAM(s) Oral <User Schedule> PRN Prior to hemodialysis  sodium chloride 0.9% Bolus. 250 milliLiter(s) IV Bolus every 5 minutes PRN SBP LESS THAN or EQUAL to 90 mmHg  sodium chloride 0.9% lock flush 10 milliLiter(s) IV Push every 1 hour PRN Pre/post blood products, medications, blood draw, and to maintain line patency      Vital Signs Last 24 Hrs  T(F): 100.1 (10-29-23 @ 20:00), Max: 100.1 (10-29-23 @ 20:00)  HR: 121 (10-29-23 @ 21:00) (103 - 121)  BP: 87/48 (10-29-23 @ 20:00) (87/48 - 146/44)  RR: 24 (10-29-23 @ 21:00) (14 - 25)  SpO2: 98% (10-29-23 @ 21:00) (89% - 100%)  Telemetry:   CAPILLARY BLOOD GLUCOSE      POCT Blood Glucose.: 145 mg/dL (29 Oct 2023 17:28)  POCT Blood Glucose.: 140 mg/dL (29 Oct 2023 13:38)  POCT Blood Glucose.: 144 mg/dL (29 Oct 2023 12:12)  POCT Blood Glucose.: 151 mg/dL (29 Oct 2023 05:28)  POCT Blood Glucose.: 148 mg/dL (28 Oct 2023 23:01)    I&O's Summary    28 Oct 2023 07:01  -  29 Oct 2023 07:00  --------------------------------------------------------  IN: 1805 mL / OUT: 2750 mL / NET: -945 mL    29 Oct 2023 07:01  -  29 Oct 2023 22:10  --------------------------------------------------------  IN: 660 mL / OUT: 0 mL / NET: 660 mL        PHYSICAL EXAM:  GENERAL: NAD, well-developed  HEAD:  Atraumatic, Normocephalic  EYES: EOMI, PERRLA, conjunctiva and sclera clear  NECK: Supple, No JVD  CHEST/LUNG: Clear to auscultation bilaterally; No wheeze  HEART: Regular rate and rhythm; No murmurs, rubs, or gallops  ABDOMEN: Soft, Nontender, Nondistended; Bowel sounds present  EXTREMITIES:  2+ Peripheral Pulses, No clubbing, cyanosis, or edema  PSYCH: AAOx3  NEUROLOGY: non-focal  SKIN: No rashes or lesions    LABS:                        7.5    11.80 )-----------( 301      ( 29 Oct 2023 03:51 )             23.8     10-29    137  |  101  |  25<H>  ----------------------------<  138<H>  4.1   |  25  |  1.12    Ca    8.5      29 Oct 2023 00:29  Phos  3.9     10-29  Mg     2.10     10-29    TPro  5.6<L>  /  Alb  1.9<L>  /  TBili  <0.2  /  DBili  x   /  AST  20  /  ALT  10  /  AlkPhos  213<H>  10-29    PTT - ( 29 Oct 2023 00:29 )  PTT:33.1 sec      Urinalysis Basic - ( 29 Oct 2023 00:29 )    Color: x / Appearance: x / SG: x / pH: x  Gluc: 138 mg/dL / Ketone: x  / Bili: x / Urobili: x   Blood: x / Protein: x / Nitrite: x   Leuk Esterase: x / RBC: x / WBC x   Sq Epi: x / Non Sq Epi: x / Bacteria: x        RADIOLOGY & ADDITIONAL TESTS:    Imaging Personally Reviewed:    Consultant(s) Notes Reviewed:      Care Discussed with Consultants/Other Providers:

## 2023-10-29 NOTE — PROGRESS NOTE ADULT - ATTENDING COMMENTS
73 F with CKD5, wheel chair bound, prior CVA here with prolonged hospital course now trach, recurrent infections, now dialysis dependent here with shock of unclear etiology    HD catheter reinserted 10/24 for HD   on eliquis  on full vent support, PS trials as tolerated  complete aztreonam for pseudomonas pneumonia as per ID  c/w increased midodrine and droxidopa, will give extra droxidopa pre-HD  cortisol indeterminate  a-line placed 10/28 for labile BP, monitor BP via a-line during HD to assess vasopressor needs  ongoing GOC given poor long term prognosis . 73 F with CKD5, wheel chair bound, prior CVA here with prolonged hospital course now trach, recurrent infections, now dialysis dependent here with shock of unclear etiology    HD catheter reinserted 10/24 for HD   on eliquis  on full vent support, PS trials as tolerated  dc aztreonam  eosinophilia workup  c/w increased midodrine and droxidopa, will give extra droxidopa pre-HD  cortisol indeterminate  a-line placed 10/28 for labile BP, monitor BP via a-line during HD to assess vasopressor needs  ongoing GOC given poor long term prognosis .

## 2023-10-29 NOTE — PROGRESS NOTE ADULT - ASSESSMENT
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       NEUROLOGY  #AMS  Multiple etiologies including active infection vs CVA.   - MR head performed w/ new infarct and old infarcts, EEG without seizure events but with high foci potential   - c/w ASA, lipitor  - c/w keppra    - will obtain another MRI     CARDIOVASCULAR  # Septic vs vasoplegic shock   Etiology likely septic iso active infection. 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   - resolved    RESPIRATORY  # AHRF second to volume overload   - CKD vs HFpEF w/ hypercarbia 2/2 volume overload requiring intubation, trach, and HD initiation  - Continue on albuterol and chest PT  - Continue volume removal with HD      #tracheomalacia   - CT CHEST (9/8) with tracheomalacia, BL pleural effusions and continued consolidations     #mechanical ventilation   - PC 24/35/8/40     GI  # UGIB   - Continue on PPI BID    # Dysphagia   - NGT-TF     RENAL  # ESRD  - HD MWF TIW with midodrine and droxidopa  - ICU for vasopressor assisted volume removal, cap to 1 pressor and not offering CRRT due to comorbidities and prognosis   - will titrate droxidopa to maintain off pressors during dialysis       INFECTIOUS DISEASE  # ESBL ECOLI and MSSA VAP c/b MSSA bacteremia   - hx of MSSA bacteremia, ESBL E. Coli sputum Cx, BAL w/ MSSA  - treated with abx   - eosinophilia workup     # Proteus and  PSA VAP/ Trachitis   - Overall difficulty with ABX tailoring given allergic rxns and resistant organisms  - aztreonam day 10, will prolong    # MAC   - outpatient treatment    HEME  # Anemia second to GIB vs renal disease   - received transfusions during stay  - management as per renal, PPI    VASCULAR   # LLE POP DVT   - on Eliquis    # HD access  - TRISHA TAYLOR (9/27 - 10/21)  - TRISHA [employer] replaced for HD     ONC   # Hx of Breast CA   - Patient dx in 2018 and s/p BL mastectomy (radical on right) and chemo and RT.     ENDOCRINE  # IDDM2   - Continued on NPH with ISS, however noted with hypoglycemic episodes and NPH dc'ed.    - Finger sticks trending up off NPH.   - Continue on NPH 6U with moderate ISS.   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       NEUROLOGY  #AMS  Multiple etiologies including active infection vs CVA.   - MR head performed w/ new infarct and old infarcts, EEG without seizure events but with high foci potential   - c/w ASA, lipitor  - c/w keppra    - will obtain another MRI when stable     CARDIOVASCULAR  # Septic vs vasoplegic shock   Etiology likely septic iso active infection. 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   - resolved    RESPIRATORY  # AHRF second to volume overload   - CKD vs HFpEF w/ hypercarbia 2/2 volume overload requiring intubation, trach, and HD initiation  - Continue on albuterol and chest PT  - Continue volume removal with HD      #tracheomalacia   - CT CHEST (9/8) with tracheomalacia, BL pleural effusions and continued consolidations     #mechanical ventilation   - PC 24/35/8/40     GI  # UGIB   - Continue on PPI BID    # Dysphagia   - NGT-TF     RENAL  # ESRD  - HD MWF TIW with midodrine and droxidopa  - ICU for vasopressor assisted volume removal, cap to 1 pressor and not offering CRRT due to comorbidities and prognosis   - will titrate droxidopa to maintain off pressors during dialysis       INFECTIOUS DISEASE    # 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 Coli sputum Cx, BAL w/ MSSA  - treated with abx   - eosinophilia workup     # Proteus and  PSA VAP/ Trachitis   - Overall difficulty with ABX tailoring given allergic rxns and resistant organisms  - aztreonam day 10, will prolong    # MAC   - outpatient treatment    HEME  # Anemia second to GIB vs renal disease   - received transfusions during stay  - management as per renal, PPI    VASCULAR   # LLE POP DVT   - on Eliquis    # HD access  - TRISHA TAYLOR (9/27 - 10/21)  - [email or web address] replaced for HD     ONC   # Hx of Breast CA   - Patient dx in 2018 and s/p BL mastectomy (radical on right) and chemo and RT.     ENDOCRINE  # IDDM2   - Continued on NPH with ISS, however noted with hypoglycemic episodes and NPH dc'ed.    - Finger sticks trending up off NPH.   - Continue on NPH 6U with moderate ISS.   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       NEUROLOGY  #AMS  Multiple etiologies including active infection vs CVA.   - MR head performed w/ new infarct and old infarcts, EEG without seizure events but with high foci potential   - c/w ASA, lipitor  - c/w keppra    - will obtain another MRI when stable     CARDIOVASCULAR  # Septic vs vasoplegic shock   Etiology likely septic iso active infection. 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   - resolved    RESPIRATORY  # AHRF second to volume overload   - CKD vs HFpEF w/ hypercarbia 2/2 volume overload requiring intubation, trach, and HD initiation  - Continue on albuterol and chest PT  - Continue volume removal with HD      #tracheomalacia   - CT CHEST (9/8) with tracheomalacia, BL pleural effusions and continued consolidations     #mechanical ventilation   - PC 24/43/8/35     GI  # UGIB   - Continue on PPI BID    # Dysphagia   - NGT-TF     RENAL  # ESRD  - HD MWF TIW with midodrine and droxidopa  - ICU for vasopressor assisted volume removal, cap to 1 pressor and not offering CRRT due to comorbidities and prognosis   - will titrate droxidopa to maintain off pressors during dialysis     INFECTIOUS DISEASE    # 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 Coli sputum Cx, BAL w/ MSSA  - treated with abx   - eosinophilia workup     # Proteus and  PSA VAP/ Trachitis   - Overall difficulty with ABX tailoring given allergic rxns and resistant organisms  - dc aztreonam    # MAC   - outpatient treatment    HEME  # Anemia second to GIB vs renal disease   - received transfusions during stay  - management as per renal, PPI    VASCULAR   # LLE POP DVT   - on Eliquis    # HD access  - TRISHA TAYLOR (9/27 - 10/21)  - [employer] replaced for HD     ONC   # Hx of Breast CA   - Patient dx in 2018 and s/p BL mastectomy (radical on right) and chemo and RT.     ENDOCRINE  # IDDM2   - Continued on NPH with ISS, however noted with hypoglycemic episodes and NPH dc'ed.    - Finger sticks trending up off NPH.   - Continue on NPH 6U with moderate ISS.   - Adjust as need     SKIN  # Drug Eruption   - treated    ETHICS/ GOC    - Attempted palliative discussion however family not interested and wishes for FULL CODE  - Family continues to want everything done despite HD failure on multiple oral pressor, now requiring IV pressors as well at baseline

## 2023-10-29 NOTE — PROGRESS NOTE ADULT - ASSESSMENT
74 y/o F well known to me from my [facility] [place] practice. she was admitted at [facility] 7/12-7/22 w aspiration PNA, was treated w CEFEPIME, developed an allergic rash,  dCHF, + MAC on AFB culture, had been progressively getting more and more lethargic and dyspneic at home since DC.   In  am of 8/11/23  ptn presented with respiratory distress w hypoxia and hypercarbia requiring intubation 2/2 volume overload +/- Asp PNA      Neuro   not responsive  Baseline MS AOx3, aphasic   - h/o CVA , on aspirin and statin . resumed w feeding tube, ASA resumed 8/26  - eeg  2/2 tremors, no sz focus, on KEPPRA  - less responsive in the past few days  - MRI 8/17:  new R cerebellar infarct, old left PCA/Occipital infarct. probably embolic in nature, did not tolerate full AC in the past, STEPHANIE is neg , no shunts observed  - ptn is poorly reactive, rpt scan done, no further CVA seen.     Cardiac   cardiology following  CHFpEF   TTE 7/2023 with EF 59%, with severe LVH and diastolic dysfunction   on Levo drip 2/2 hypotension   on midodrine 40 mg qid, droxidopa 600 tid, additional 200 mgg prior to HD,     Pulmonary   Acute hypercapnic and hypoxemic respiratory failure   prolonged intubation, now  trache to  vent, has copious secretions      Renal   on HD via L IJ Shiley, tunneled catheter when clinically stable  HD MWF, midodrine assisted, PUF in between HD days, unable to remove proper volume 2/2 hypotension.   permacath when clinically stable      GI  NPO  on tube feeds  coffee ground emesis x 1 8/24, melena overnight 8/31, s/p 1UPRBC, EGD/Colonoscopy: erosive gastropathy, esophagisits, on colonoscopy old blood seen no active bleeding, poor prep  family to decide re PEG placement    Endocrine  on Insulin: NPH, Admelog    ID   complicated infectious hospital course  treated for MSSA bacteremia, aspiration PNA.  sputum + for pseudomonas, ptn was initially on zosyn but was allergic, then was switched to aztreonam   Aztreonam was switched to polymyxin for a possible allergy but ptn didnt have a reaction to aztreonam, she had a reaction to Zosyn.   spike temps again while off Abx, Aztreonam resumed 10/17, DCed 10/29      ID course complicated with multiple ABx allergies  also treated for Left O. 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 and she remains full code at present

## 2023-10-29 NOTE — PROGRESS NOTE ADULT - ASSESSMENT
trached to vent  s/p HD Saturday with 2L net fluid loss  NAD  Next HD Tuesday    answered daughters questions at bedside    acetaminophen   Oral Liquid .. 650 milliGRAM(s) Oral every 6 hours PRN  albuterol    0.083% 2.5 milliGRAM(s) Nebulizer every 6 hours  apixaban 5 milliGRAM(s) Oral every 12 hours  artificial tears (preservative free) Ophthalmic Solution 1 Drop(s) Both EYES every 4 hours  atorvastatin 10 milliGRAM(s) Oral at bedtime  aztreonam  IVPB 2000 milliGRAM(s) IV Intermittent <User Schedule>  chlorhexidine 0.12% Liquid 15 milliLiter(s) Oral Mucosa every 12 hours  chlorhexidine 2% Cloths 1 Application(s) Topical daily  dextrose 5%. 1000 milliLiter(s) IV Continuous <Continuous>  dextrose 5%. 1000 milliLiter(s) IV Continuous <Continuous>  dextrose 50% Injectable 12.5 Gram(s) IV Push once  dextrose 50% Injectable 25 Gram(s) IV Push once  dextrose 50% Injectable 25 Gram(s) IV Push once  dextrose 50% Injectable 25 Gram(s) IV Push once  dextrose Oral Gel 15 Gram(s) Oral once PRN  diphenhydrAMINE Elixir 50 milliGRAM(s) Oral once PRN  droxidopa 600 milliGRAM(s) Oral every 8 hours  droxidopa 200 milliGRAM(s) Oral <User Schedule> PRN  epoetin odalis (EPOGEN) Injectable 07705 Unit(s) IV Push <User Schedule>  ferrous    sulfate Liquid 300 milliGRAM(s) Oral daily  glucagon  Injectable 1 milliGRAM(s) IntraMuscular once  insulin lispro (ADMELOG) corrective regimen sliding scale   SubCutaneous every 6 hours  insulin NPH human recombinant 6 Unit(s) SubCutaneous every 6 hours  levETIRAcetam  Solution 1000 milliGRAM(s) Oral daily  levETIRAcetam  Solution 250 milliGRAM(s) Oral <User Schedule>  midodrine. 40 milliGRAM(s) Oral <User Schedule>  norepinephrine Infusion 0.05 MICROgram(s)/kG/Min IV Continuous <Continuous>  pantoprazole  Injectable 40 milliGRAM(s) IV Push two times a day  petrolatum Ophthalmic Ointment 1 Application(s) Both EYES four times a day  sodium chloride 1 Gram(s) Oral two times a day  sodium chloride 0.9% Bolus. 250 milliLiter(s) IV Bolus every 5 minutes PRN  sodium chloride 0.9% lock flush 10 milliLiter(s) IV Push every 1 hour PRN      VITAL:  T(C): , Max: 37.6 (10-29-23 @ 04:00)  T(F): , Max: 99.7 (10-29-23 @ 04:00)  HR: 120 (10-29-23 @ 06:00)  BP: 90/45 (10-29-23 @ 04:00)  BP(mean): 57 (10-29-23 @ 04:00)  RR: 21 (10-29-23 @ 06:00)  SpO2: 100% (10-29-23 @ 06:00)  Wt(kg): --    10-28-23 @ 07:01  -  10-29-23 @ 07:00  --------------------------------------------------------  IN: 1805 mL / OUT: 2750 mL / NET: -945 mL        PHYSICAL EXAM:  Constitutional: trach to vent  HEENT: + tracheostomy, + NGT  Respiratory: Coarse BS  Cardiovascular: tachy   Gastrointestinal: BS+, soft, NT/ND  Extremities: ++ peripheral edema  Neurological: UTO  : No Wheeler  [facility]     LABS:                          7.5    11.80 )-----------( 301      ( 29 Oct 2023 03:51 )             23.8     Na(137)/K(4.1)/Cl(101)/HCO3(25)/BUN(25)/Cr(1.12)Glu(138)/Ca(8.5)/Mg(2.10)/PO4(3.9)    10-29 @ 00:29  Na(135)/K(4.5)/Cl(100)/HCO3(27)/BUN(32)/Cr(1.31)Glu(131)/Ca(8.7)/Mg(2.20)/PO4(4.7)    10-28 @ 03:00  Na(135)/K(3.9)/Cl(102)/HCO3(26)/BUN(25)/Cr(1.10)Glu(113)/Ca(8.6)/Mg(--)/PO4(--)    10-27 @ 01:00    Urinalysis Basic - ( 29 Oct 2023 00:29 )    Color: x / Appearance: x / SG: x / pH: x  Gluc: 138 mg/dL / Ketone: x  / Bili: x / Urobili: x   Blood: x / Protein: x / Nitrite: x   Leuk Esterase: x / RBC: x / WBC x   Sq Epi: x / Non Sq Epi: x / Bacteria: x              IMPRESSION: 73F w/ HTN, DM2, CVA, breast CA-bilateral mastectomy, recurrent aspiration pneumonia/respiratory failure, and CKD, 8/11/23 p/w acute hypercapnic respiratory failure; c/b RUSS    (1)Renal - RUSS on CKD4 ==>now newly ESRD. s/p HD today with 2L net fluid loss, subsequent HD Tuesday    (2)Lytes- controlled    (3)CV- tenuous hemodynamics such with each passing week we have more difficulty performing HD/achieving UF, persistent issue.     (4)ID- BCx from 10/14/23 NGTD, BC (10/17) NGTD , s/p IV abx     (5)Pulm- trach/vent-dependent    (6)Anemia- hgb low, epo with HD, defer  transfusion to primary team if there should be the need    RECOMMEND:  (1)HD Tuesday; pressors and sodium modelling as needed to keep SBP>90; Epogen with HD   (2)Dose new meds for GFR <10/HD.       Nas Corona, NP-BC  Rupture  [phone]

## 2023-10-30 NOTE — PROGRESS NOTE ADULT - SUBJECTIVE AND OBJECTIVE BOX
Subjective: Patient seen and examined. No new events except as noted.   remains in ICU     REVIEW OF SYSTEMS:  Unable to obtain     MEDICATIONS:  MEDICATIONS  (STANDING):  albuterol    0.083% 2.5 milliGRAM(s) Nebulizer every 6 hours  apixaban 5 milliGRAM(s) Oral every 12 hours  artificial tears (preservative free) Ophthalmic Solution 1 Drop(s) Both EYES every 4 hours  atorvastatin 10 milliGRAM(s) Oral at bedtime  chlorhexidine 0.12% Liquid 15 milliLiter(s) Oral Mucosa every 12 hours  chlorhexidine 2% Cloths 1 Application(s) Topical daily  dextrose 5%. 1000 milliLiter(s) (100 mL/Hr) IV Continuous <Continuous>  dextrose 5%. 1000 milliLiter(s) (50 mL/Hr) IV Continuous <Continuous>  dextrose 50% Injectable 25 Gram(s) IV Push once  dextrose 50% Injectable 25 Gram(s) IV Push once  dextrose 50% Injectable 25 Gram(s) IV Push once  dextrose 50% Injectable 12.5 Gram(s) IV Push once  droxidopa 600 milliGRAM(s) Oral every 8 hours  epoetin odalis (EPOGEN) Injectable 14630 Unit(s) IV Push <User Schedule>  ferrous    sulfate Liquid 300 milliGRAM(s) Oral daily  glucagon  Injectable 1 milliGRAM(s) IntraMuscular once  insulin lispro (ADMELOG) corrective regimen sliding scale   SubCutaneous every 6 hours  insulin NPH human recombinant 6 Unit(s) SubCutaneous every 6 hours  levETIRAcetam  Solution 1000 milliGRAM(s) Oral daily  levETIRAcetam  Solution 250 milliGRAM(s) Oral <User Schedule>  midodrine. 40 milliGRAM(s) Oral <User Schedule>  norepinephrine Infusion 0.05 MICROgram(s)/kG/Min (6.23 mL/Hr) IV Continuous <Continuous>  pantoprazole  Injectable 40 milliGRAM(s) IV Push two times a day  petrolatum Ophthalmic Ointment 1 Application(s) Both EYES four times a day  sodium chloride 1 Gram(s) Oral two times a day      PHYSICAL EXAM:  T(C): 36.7 (10-30-23 @ 08:00), Max: 37.8 (10-29-23 @ 20:00)  HR: 106 (10-30-23 @ 09:00) (105 - 124)  BP: 94/35 (10-30-23 @ 08:00) (59/47 - 146/44)  RR: 24 (10-30-23 @ 09:00) (19 - 24)  SpO2: 100% (10-30-23 @ 09:00) (89% - 100%)  Wt(kg): --  I&O's Summary    29 Oct 2023 07:01  -  30 Oct 2023 07:00  --------------------------------------------------------  IN: 1210 mL / OUT: 0 mL / NET: 1210 mL    30 Oct 2023 07:01  -  30 Oct 2023 10:28  --------------------------------------------------------  IN: 40 mL / OUT: 0 mL / NET: 40 mL          Appearance: NAD, +trach  HEENT: dry oral mucosa  Lymphatic: No lymphadenopathy  Cardiovascular: Normal S1 S2, No JVD, No murmurs, No edema  Respiratory: Decreased BS, + trach to vent	  Neuro: opens eyes  Gastrointestinal: Soft, Non-tender, + BS, + NGT  Skin: No rashes, No ecchymoses, No cyanosis	  Extremities: No strength/ROM 2/2 sedation, + BL LE edema  Vascular: Peripheral pulses palpable 2+ bilaterally  Anasarca   Mode: AC/ CMV (Assist Control/ Continuous Mandatory Ventilation), RR (machine): 24, FiO2: 35, PEEP: 8, ITime: 0.8, MAP: 19, PC: 35, PIP: 43    LABS:    CARDIAC MARKERS:                                7.9    15.95 )-----------( 374      ( 30 Oct 2023 03:30 )             25.7     10-30    138  |  103  |  32<H>  ----------------------------<  146<H>  4.9   |  25  |  1.31<H>    Ca    8.7      30 Oct 2023 03:30  Phos  5.0     10-30  Mg     2.30     10-30    TPro  5.8<L>  /  Alb  1.9<L>  /  TBili  <0.2  /  DBili  x   /  AST  23  /  ALT  10  /  AlkPhos  226<H>  10-30    Blood Gas Profile - Arterial (10.30.23 @ 03:30)   pH, Arterial: 7.42  pCO2, Arterial: 42 mmHg  pO2, Arterial: 170 mmHg  HCO3, Arterial: 27 mmol/L  Base Excess, Arterial: 2.5 mmol/L  Oxygen Saturation, Arterial: 97.6 %  Total CO2, Arterial: 28 mmol/L          TELEMETRY: 	SR    ECG:  	  RADIOLOGY:   DIAGNOSTIC TESTING:  [ ] Echocardiogram:  [ ]  Catheterization:  [ ] Stress Test:    OTHER:

## 2023-10-30 NOTE — PROGRESS NOTE ADULT - SUBJECTIVE AND OBJECTIVE BOX
[facility] [phone]        Patient seen and examined she had HD Saturday 2L removed, norepinephrine infusing while on HD. [place] inserted Saturday afternoon BP now 150's not correlating with cuff pressures that read SBP 90's.         MEDICATIONS  (STANDING):  albuterol    0.083% 2.5 milliGRAM(s) Nebulizer every 6 hours  apixaban 5 milliGRAM(s) Oral every 12 hours  artificial tears (preservative free) Ophthalmic Solution 1 Drop(s) Both EYES every 4 hours  atorvastatin 10 milliGRAM(s) Oral at bedtime  chlorhexidine 0.12% Liquid 15 milliLiter(s) Oral Mucosa every 12 hours  chlorhexidine 2% Cloths 1 Application(s) Topical daily  dextrose 5%. 1000 milliLiter(s) (100 mL/Hr) IV Continuous <Continuous>  dextrose 5%. 1000 milliLiter(s) (50 mL/Hr) IV Continuous <Continuous>  dextrose 50% Injectable 25 Gram(s) IV Push once  dextrose 50% Injectable 25 Gram(s) IV Push once  dextrose 50% Injectable 25 Gram(s) IV Push once  dextrose 50% Injectable 12.5 Gram(s) IV Push once  droxidopa 600 milliGRAM(s) Oral every 8 hours  epoetin odalis (EPOGEN) Injectable 66957 Unit(s) IV Push <User Schedule>  ferrous    sulfate Liquid 300 milliGRAM(s) Oral daily  glucagon  Injectable 1 milliGRAM(s) IntraMuscular once  insulin lispro (ADMELOG) corrective regimen sliding scale   SubCutaneous every 6 hours  insulin NPH human recombinant 6 Unit(s) SubCutaneous every 6 hours  levETIRAcetam  Solution 1000 milliGRAM(s) Oral daily  levETIRAcetam  Solution 250 milliGRAM(s) Oral <User Schedule>  midodrine. 40 milliGRAM(s) Oral <User Schedule>  norepinephrine Infusion 0.05 MICROgram(s)/kG/Min (6.23 mL/Hr) IV Continuous <Continuous>  pantoprazole  Injectable 40 milliGRAM(s) IV Push two times a day  petrolatum Ophthalmic Ointment 1 Application(s) Both EYES four times a day  sodium chloride 1 Gram(s) Oral two times a day      VITAL:  T(C): , Max: 37.8 (10-29-23 @ 20:00)  T(F): , Max: 100.1 (10-29-23 @ 20:00)  HR: 110 (10-30-23 @ 13:00)  BP: 92/38 (10-30-23 @ 12:00)  BP(mean): 54 (10-30-23 @ 12:00)  RR: 24 (10-30-23 @ 13:00)  SpO2: 100% (10-30-23 @ 13:00)  Wt(kg): --    I and O's:    10-29 @ 07:01  -  10-30 @ 07:00  --------------------------------------------------------  IN: 1210 mL / OUT: 0 mL / NET: 1210 mL    10-30 @ 07:01  -  10-30 @ 14:00  --------------------------------------------------------  IN: 300 mL / OUT: 0 mL / NET: 300 mL          PHYSICAL EXAM:    Constitutional: critically ill, trach to vent   HEENT: + NGT, + tracheostomy   Respiratory: coarse BS  Cardiovascular: S1 and S2  Gastrointestinal: BS+, soft, NT/ND  Extremities: ++ peripheral edema  Neurological: UTO  : No Wheeler  Skin: No rashes  Access: [facility]     LABS:                        7.9    15.95 )-----------( 374      ( 30 Oct 2023 03:30 )             25.7     10-30    138  |  103  |  32<H>  ----------------------------<  146<H>  4.9   |  25  |  1.31<H>    Ca    8.7      30 Oct 2023 03:30  Phos  5.0     10-30  Mg     2.30     10-30    TPro  5.8<L>  /  Alb  1.9<L>  /  TBili  <0.2  /  DBili  x   /  AST  23  /  ALT  10  /  AlkPhos  226<H>  10-30          Urine Studies:  Urinalysis Basic - ( 30 Oct 2023 03:30 )    Color: x / Appearance: x / SG: x / pH: x  Gluc: 146 mg/dL / Ketone: x  / Bili: x / Urobili: x   Blood: x / Protein: x / Nitrite: x   Leuk Esterase: x / RBC: x / WBC x   Sq Epi: x / Non Sq Epi: x / Bacteria: x              Assessment and Plan:   · Assessment	    IMPRESSION: 73F w/ HTN, DM2, CVA, breast CA-bilateral mastectomy, recurrent aspiration pneumonia/respiratory failure, and CKD, 8/11/23 p/w acute hypercapnic respiratory failure; c/b RUSS    (1)Renal - RUSS on CKD4 ==>now newly ESRD. Last dialyzed Saturday, HD today     (2)Hyponatremia - improved     (3)CV- tenuous hemodynamics such with each passing week we have more difficulty performing HD/achieving UF, persistent issue. BP stable with [place], attempt HD without pressor support today     (4)ID- BCx from 10/14/23 NGTD, BC (10/17) NGTD on IV abx     (5)Pulm- trach/vent-dependent    (6)Anemia- hgb low, epo with HD    RECOMMEND:  (1)HD today 2.5 kg UF; sodium modelling as needed to keep SBP>90; Epogen with HD   (2)Dose new meds for GFR <10/HD.     D/w ICU    [facility] NP  [facility]  [phone]            [facility] [phone]        Patient seen and examined she had HD Saturday 2L removed, norepinephrine infusing while on HD. [place] inserted Saturday afternoon BP now 150's not correlating with cuff pressures that read SBP 90's.         MEDICATIONS  (STANDING):  albuterol    0.083% 2.5 milliGRAM(s) Nebulizer every 6 hours  apixaban 5 milliGRAM(s) Oral every 12 hours  artificial tears (preservative free) Ophthalmic Solution 1 Drop(s) Both EYES every 4 hours  atorvastatin 10 milliGRAM(s) Oral at bedtime  chlorhexidine 0.12% Liquid 15 milliLiter(s) Oral Mucosa every 12 hours  chlorhexidine 2% Cloths 1 Application(s) Topical daily  dextrose 5%. 1000 milliLiter(s) (100 mL/Hr) IV Continuous <Continuous>  dextrose 5%. 1000 milliLiter(s) (50 mL/Hr) IV Continuous <Continuous>  dextrose 50% Injectable 25 Gram(s) IV Push once  dextrose 50% Injectable 25 Gram(s) IV Push once  dextrose 50% Injectable 25 Gram(s) IV Push once  dextrose 50% Injectable 12.5 Gram(s) IV Push once  droxidopa 600 milliGRAM(s) Oral every 8 hours  epoetin odalis (EPOGEN) Injectable 38850 Unit(s) IV Push <User Schedule>  ferrous    sulfate Liquid 300 milliGRAM(s) Oral daily  glucagon  Injectable 1 milliGRAM(s) IntraMuscular once  insulin lispro (ADMELOG) corrective regimen sliding scale   SubCutaneous every 6 hours  insulin NPH human recombinant 6 Unit(s) SubCutaneous every 6 hours  levETIRAcetam  Solution 1000 milliGRAM(s) Oral daily  levETIRAcetam  Solution 250 milliGRAM(s) Oral <User Schedule>  midodrine. 40 milliGRAM(s) Oral <User Schedule>  norepinephrine Infusion 0.05 MICROgram(s)/kG/Min (6.23 mL/Hr) IV Continuous <Continuous>  pantoprazole  Injectable 40 milliGRAM(s) IV Push two times a day  petrolatum Ophthalmic Ointment 1 Application(s) Both EYES four times a day  sodium chloride 1 Gram(s) Oral two times a day      VITAL:  T(C): , Max: 37.8 (10-29-23 @ 20:00)  T(F): , Max: 100.1 (10-29-23 @ 20:00)  HR: 110 (10-30-23 @ 13:00)  BP: 92/38 (10-30-23 @ 12:00)  BP(mean): 54 (10-30-23 @ 12:00)  RR: 24 (10-30-23 @ 13:00)  SpO2: 100% (10-30-23 @ 13:00)  Wt(kg): --    I and O's:    10-29 @ 07:01  -  10-30 @ 07:00  --------------------------------------------------------  IN: 1210 mL / OUT: 0 mL / NET: 1210 mL    10-30 @ 07:01  -  10-30 @ 14:00  --------------------------------------------------------  IN: 300 mL / OUT: 0 mL / NET: 300 mL          PHYSICAL EXAM:    Constitutional: critically ill, trach to vent   HEENT: + NGT, + tracheostomy   Respiratory: coarse BS  Cardiovascular: S1 and S2  Gastrointestinal: BS+, soft, NT/ND  Extremities: ++ peripheral edema  Neurological: UTO  : No Wheeler  Skin: No rashes  Access: [facility]     LABS:                        7.9    15.95 )-----------( 374      ( 30 Oct 2023 03:30 )             25.7     10-30    138  |  103  |  32<H>  ----------------------------<  146<H>  4.9   |  25  |  1.31<H>    Ca    8.7      30 Oct 2023 03:30  Phos  5.0     10-30  Mg     2.30     10-30    TPro  5.8<L>  /  Alb  1.9<L>  /  TBili  <0.2  /  DBili  x   /  AST  23  /  ALT  10  /  AlkPhos  226<H>  10-30          Urine Studies:  Urinalysis Basic - ( 30 Oct 2023 03:30 )    Color: x / Appearance: x / SG: x / pH: x  Gluc: 146 mg/dL / Ketone: x  / Bili: x / Urobili: x   Blood: x / Protein: x / Nitrite: x   Leuk Esterase: x / RBC: x / WBC x   Sq Epi: x / Non Sq Epi: x / Bacteria: x              Assessment and Plan:   · Assessment	    IMPRESSION: 73F w/ HTN, DM2, CVA, breast CA-bilateral mastectomy, recurrent aspiration pneumonia/respiratory failure, and CKD, 8/11/23 p/w acute hypercapnic respiratory failure; c/b RUSS    (1)Renal - RUSS on CKD4 ==>now newly ESRD. Last dialyzed Saturday, HD today     (2)Hyponatremia - improved     (3)CV- tenuous hemodynamics such with each passing week we have more difficulty performing HD/achieving UF, persistent issue. BP stable with [place], attempt HD without pressor support today     (4)ID- BCx from 10/14/23 NGTD, BC (10/17) NGTD on IV abx     (5)Pulm- trach/vent-dependent    (6)Anemia- hgb low, epo with HD    RECOMMEND:  (1)HD today 2.5 kg UF; sodium modelling as needed to keep SBP>90; Epogen with HD   (2)Dose new meds for GFR <10/HD.     D/w ICU    [facility] NP  [facility]  [phone]       RENAL ATTENDING NOTE  Patient seen and examined with NP. Agree with assessment and plan as above.    Jimmy Colon MD  [facility]  [phone]     Attending Attestation (For Attendings USE Only)...

## 2023-10-30 NOTE — PROGRESS NOTE ADULT - ASSESSMENT
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 While in MICU patient noted with worsening renal failure requiring HD initiation, CGE/ Melena s/p EGD 8/31 found with esophagitis and erosive gastropathy, new right cerebellar infarct and fevers second to ESBL COLI and MSSA VAP, MSSA bacteremia, left ear otitis externa and drug rxn to cephalosporins Course further complicated by prolonged vent time s/p tracheostomy 9/1 and transferred to RCU 9/3 completed by recurrent fevers with high PIP, respiratory acidosis and concern for volume overloaded.   CTH repeated 9/26 for concern for encephalopathy - has known now - chronic bilateral cerebellar infarcts, L PCA infarct. L parietal hypodensity seen on prior CT likely artifactual  Repeat CTH 10/3 - unchanged  EEG 10/5 with L posterocentral/temporal spikes/sharp waves - doubt true focal seizure upon further review of EEG     Impression:   R/o new focal seizure - on Keppra  Suspect underlying movement d/o - PD?  Metabolic encephalopathy related to shock, infection, doubt new stroke  Multiple embolic strokes s/p ILR without events  Dementia   MSSA bacteremia, s/p Daptomycin  Acute popliteal DVT on Eliquis   Anemia     Recommendations   [] care per MICU for persistent hypotension  [] has multiple areas of epileptogenic potential on EEG, no clear seizure correlate seen. On Keppra but no improvement in mental status- new R facial twitching, would get 24h EEG and if negative can d.c  [] consider MRI brain once stable but doubt will change mgmt  [] mgmt of hypotension per picristiany team, remains full code  [] Keppra 500mg BID with extra 250mg after HD on HD days  [] Abx per ID  [] C/w home Atorvastatin 10 mg qhs   [] continue to address GOC with family, poor prognosis    Katty Charles DO  Vascular Neurology  Office [phone]

## 2023-10-30 NOTE — PROGRESS NOTE ADULT - ATTENDING COMMENTS
Patient is a 72 yo F (wheelchair bound prior to admission) w/ HFpEF, T2DM, CKD5, latent TB (s/p tx,) hx breast ca (2018, s/p mastectomy and adjuvant CT/RT), and hx prior CVA s/p trach/PEG (decannulated prior to admission, ILR in place) who was initially admitted on 8/11/23 after p/w with acute hypoxemic and hypercapnic respiratory failure thought to be 2/2 Aspiration PNA vs ADHF/flash pulmonary edema in setting of HTN emergency. Patient required intubation and mechanical ventilationtubation, and was admitted to MICU for lengthy period of time.     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     Hospital course also marked by recurrent infections, most recently with MSSA bacteremia (s/p Vanc due to possible cefepime vs. cefazolin drug-induced rash) and MSSA/ESBL EColi in BAL. Also found to have L otitis externa (9/5) s/p ENT debridement c/b concern for superimposed candida infection (s/p Meropenem, Fluconazole). Also further found to have L popliteal DVT.    Patient transferred back to MICU for trial of HD, possibly requiring IV vasopressors. A line placed over the weekend revealing inaccurate noninvasive BP measurements    #Shock  #Encephalopathy  #Multiple Strokes  #UGIB  #L popliteal DVT  #L fungal otitis externa  #L endophthalmitis  #Chronic respiratory failure  #Oropharyngeal dysphagia  #RUSS on CKD  #Pleural effusions  - c/w PO vasopressors to maintain MAP > 65 now with accurate BP with A line. WIll assess need for IV vasopressors for HD  - c/w mechanical ventilatory support, trach care per MICU team. Changed to XLT trach earlier given excessive dynamic airway collapse  - HD as per renal (HD catheter reinserted 10/24 for HD)  - c/w tube feeds  - c/w Eliquis, hb stable. WIll resume ASA  - f/u eosinophilia workup  - ENT reeval for R ear discoloration and concern for L ear drainage as per family    Mitchell Mcnamara MD  Pulmonary & Critical Care Patient is a 72 yo F (wheelchair bound prior to admission) w/ HFpEF, T2DM, CKD5, latent TB (s/p tx,) hx breast ca (2018, s/p mastectomy and adjuvant CT/RT), and hx prior CVA s/p trach/PEG (decannulated prior to admission, ILR in place) who was initially admitted on 8/11/23 after p/w with acute hypoxemic and hypercapnic respiratory failure thought to be 2/2 Aspiration PNA vs ADHF/flash pulmonary edema in setting of HTN emergency. Patient required intubation and mechanical ventilationtubation, and was admitted to MICU for lengthy period of time.     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     Hospital course also marked by recurrent infections, including MSSA bacteremia (s/p Vanc due to possible cefepime vs. cefazolin drug-induced rash) and MSSA/ESBL EColi in BAL. Also found to hve L otitis externa (9/5) s/p ENT debridement c/b concern for superimposed candida infection (s/p Meropenem, Fluconazole). Most recently w/ Proteus/Pseudomonas in sputum now s/p Aztreonam. Also further found to have L popliteal DVT.    Patient transferred back to MICU for trial of HD, possibly requiring IV vasopressors. A line placed over the weekend revealing inaccurate noninvasive BP measurements    #Shock  #Encephalopathy  #Multiple Strokes  #UGIB  #L popliteal DVT  #L fungal otitis externa  #L endophthalmitis  #Chronic respiratory failure  #Oropharyngeal dysphagia  #RUSS on CKD  #Pleural effusions  - c/w PO vasopressors to maintain MAP > 65 now with accurate BP with A line. WIll assess need for IV vasopressors for HD  - c/w mechanical ventilatory support, trach care per MICU team. Changed to XLT trach earlier given excessive dynamic airway collapse  - HD as per renal (HD catheter reinserted 10/24 for HD)  - c/w tube feeds  - c/w Eliquis, hb stable. WIll resume ASA  - f/u eosinophilia workup  - ENT reeval for R ear discoloration and concern for L ear drainage as per family    Mitchell Mcnamara MD  Pulmonary & Critical Care

## 2023-10-30 NOTE — PROGRESS NOTE ADULT - SUBJECTIVE AND OBJECTIVE BOX
MICU Progress Note    INTERVAL HPI/OVERNIGHT EVENTS:    SUBJECTIVE: Patient seen and examined at bedside.     CONSTITUTIONAL: No weakness, fevers or chills  EYES/ENT: No visual changes;  No vertigo or throat pain   NECK: No pain or stiffness  RESPIRATORY: No cough, wheezing, hemoptysis; No shortness of breath  CARDIOVASCULAR: No chest pain or palpitations  GASTROINTESTINAL: No abdominal or epigastric pain. No nausea, vomiting, or hematemesis; No diarrhea or constipation. No melena or hematochezia.  GENITOURINARY: No dysuria, frequency or hematuria  NEUROLOGICAL: No numbness or weakness  SKIN: No itching, rashes    OBJECTIVE:    VITAL SIGNS:  ICU Vital Signs Last 24 Hrs  T(C): 37.2 (30 Oct 2023 04:00), Max: 37.8 (29 Oct 2023 08:00)  T(F): 99 (30 Oct 2023 04:00), Max: 100.1 (29 Oct 2023 20:00)  HR: 114 (30 Oct 2023 06:00) (103 - 124)  BP: 59/47 (30 Oct 2023 04:00) (59/47 - 146/44)  BP(mean): 54 (30 Oct 2023 04:00) (54 - 72)  ABP: 145/59 (30 Oct 2023 06:00) (119/52 - 153/63)  ABP(mean): 96 (30 Oct 2023 06:00) (78 - 102)  RR: 24 (30 Oct 2023 06:00) (14 - 24)  SpO2: 100% (30 Oct 2023 06:00) (89% - 100%)    O2 Parameters below as of 30 Oct 2023 06:00  Patient On (Oxygen Delivery Method): ventilator    O2 Concentration (%): 35      Mode: AC/ CMV (Assist Control/ Continuous Mandatory Ventilation), RR (machine): 24, FiO2: 35, PEEP: 8, ITime: 0.8, MAP: 19, PC: 35, PIP: 43    10-29 @ 07:01  -  10-30 @ 07:00  --------------------------------------------------------  IN: 1170 mL / OUT: 0 mL / NET: 1170 mL      CAPILLARY BLOOD GLUCOSE      POCT Blood Glucose.: 143 mg/dL (30 Oct 2023 05:48)      PHYSICAL EXAM:    General: NAD  HEENT: NC/AT; PERRL, clear conjunctiva  Neck: supple  Respiratory: CTA b/l  Cardiovascular: +S1/S2; RRR  Abdomen: soft, NT/ND; +BS x4  Extremities: WWP, 2+ peripheral pulses b/l; no LE edema  Skin: normal color and turgor; no rash  Neurological:    MEDICATIONS:  MEDICATIONS  (STANDING):  albuterol    0.083% 2.5 milliGRAM(s) Nebulizer every 6 hours  apixaban 5 milliGRAM(s) Oral every 12 hours  artificial tears (preservative free) Ophthalmic Solution 1 Drop(s) Both EYES every 4 hours  atorvastatin 10 milliGRAM(s) Oral at bedtime  chlorhexidine 0.12% Liquid 15 milliLiter(s) Oral Mucosa every 12 hours  chlorhexidine 2% Cloths 1 Application(s) Topical daily  dextrose 5%. 1000 milliLiter(s) (50 mL/Hr) IV Continuous <Continuous>  dextrose 5%. 1000 milliLiter(s) (100 mL/Hr) IV Continuous <Continuous>  dextrose 50% Injectable 12.5 Gram(s) IV Push once  dextrose 50% Injectable 25 Gram(s) IV Push once  dextrose 50% Injectable 25 Gram(s) IV Push once  dextrose 50% Injectable 25 Gram(s) IV Push once  droxidopa 600 milliGRAM(s) Oral every 8 hours  epoetin odalis (EPOGEN) Injectable 03514 Unit(s) IV Push <User Schedule>  ferrous    sulfate Liquid 300 milliGRAM(s) Oral daily  glucagon  Injectable 1 milliGRAM(s) IntraMuscular once  insulin lispro (ADMELOG) corrective regimen sliding scale   SubCutaneous every 6 hours  insulin NPH human recombinant 6 Unit(s) SubCutaneous every 6 hours  levETIRAcetam  Solution 1000 milliGRAM(s) Oral daily  levETIRAcetam  Solution 250 milliGRAM(s) Oral <User Schedule>  midodrine. 40 milliGRAM(s) Oral <User Schedule>  norepinephrine Infusion 0.05 MICROgram(s)/kG/Min (6.23 mL/Hr) IV Continuous <Continuous>  pantoprazole  Injectable 40 milliGRAM(s) IV Push two times a day  petrolatum Ophthalmic Ointment 1 Application(s) Both EYES four times a day  sodium chloride 1 Gram(s) Oral two times a day    MEDICATIONS  (PRN):  acetaminophen   Oral Liquid .. 650 milliGRAM(s) Oral every 6 hours PRN Temp greater or equal to 38C (100.4F), Mild Pain (1 - 3)  dextrose Oral Gel 15 Gram(s) Oral once PRN Blood Glucose LESS THAN 70 milliGRAM(s)/deciliter  diphenhydrAMINE Elixir 50 milliGRAM(s) Oral once PRN Rash and/or Itching  droxidopa 200 milliGRAM(s) Oral <User Schedule> PRN Prior to hemodialysis  sodium chloride 0.9% Bolus. 250 milliLiter(s) IV Bolus every 5 minutes PRN SBP LESS THAN or EQUAL to 90 mmHg  sodium chloride 0.9% lock flush 10 milliLiter(s) IV Push every 1 hour PRN Pre/post blood products, medications, blood draw, and to maintain line patency      ALLERGIES:  Allergies    isoniazid (Rash)  nafcillin (Unknown)  hydrALAZINE (Rash)  vitamin E (Short breath; Urticaria; Hives)  doxycycline (Rash)  cefepime (Rash)  NIFEdipine (Urticaria; Hives)  Zosyn (Rash)    Intolerances        LABS:                        7.9    15.95 )-----------( 374      ( 30 Oct 2023 03:30 )             25.7     10-30    138  |  103  |  32<H>  ----------------------------<  146<H>  4.9   |  25  |  1.31<H>    Ca    8.7      30 Oct 2023 03:30  Phos  5.0     10-30  Mg     2.30     10-30    TPro  5.8<L>  /  Alb  1.9<L>  /  TBili  <0.2  /  DBili  x   /  AST  23  /  ALT  10  /  AlkPhos  226<H>  10-30    PTT - ( 30 Oct 2023 03:30 )  PTT:34.7 sec  Urinalysis Basic - ( 30 Oct 2023 03:30 )    Color: x / Appearance: x / SG: x / pH: x  Gluc: 146 mg/dL / Ketone: x  / Bili: x / Urobili: x   Blood: x / Protein: x / Nitrite: x   Leuk Esterase: x / RBC: x / WBC x   Sq Epi: x / Non Sq Epi: x / Bacteria: x       MICU Progress Note    INTERVAL HPI/OVERNIGHT EVENTS: No events overnight. Midodrine decreased from 40mg --> 20mg yesterday.     SUBJECTIVE: Patient seen and examined at bedside. Withdraws to painful stimuli, otherwise not participating meaningfully in interview/exam. Patient's daughter at bedside notes interval darkening of her R earlobe.     ROS: unable to assess    OBJECTIVE:    VITAL SIGNS:  ICU Vital Signs Last 24 Hrs  T(C): 37.2 (30 Oct 2023 04:00), Max: 37.8 (29 Oct 2023 08:00)  T(F): 99 (30 Oct 2023 04:00), Max: 100.1 (29 Oct 2023 20:00)  HR: 114 (30 Oct 2023 06:00) (103 - 124)  BP: 59/47 (30 Oct 2023 04:00) (59/47 - 146/44)  BP(mean): 54 (30 Oct 2023 04:00) (54 - 72)  ABP: 145/59 (30 Oct 2023 06:00) (119/52 - 153/63)  ABP(mean): 96 (30 Oct 2023 06:00) (78 - 102)  RR: 24 (30 Oct 2023 06:00) (14 - 24)  SpO2: 100% (30 Oct 2023 06:00) (89% - 100%)    O2 Parameters below as of 30 Oct 2023 06:00  Patient On (Oxygen Delivery Method): ventilator    O2 Concentration (%): 35      Mode: AC/ CMV (Assist Control/ Continuous Mandatory Ventilation), RR (machine): 24, FiO2: 35, PEEP: 8, ITime: 0.8, MAP: 19, PC: 35, PIP: 43    10-29 @ 07:01  -  10-30 @ 07:00  --------------------------------------------------------  IN: 1170 mL / OUT: 0 mL / NET: 1170 mL      CAPILLARY BLOOD GLUCOSE      POCT Blood Glucose.: 143 mg/dL (30 Oct 2023 05:48)      PHYSICAL EXAM:    General: NAD  HEENT: NC/AT; PERRL, clear conjunctiva  Neck: supple  Respiratory: CTA b/l  Cardiovascular: +S1/S2; RRR  Abdomen: soft, NT/ND; +BS x4  Extremities: WWP, 2+ peripheral pulses b/l; no LE edema  Skin: normal color and turgor; no rash  Neurological:    MEDICATIONS:  MEDICATIONS  (STANDING):  albuterol    0.083% 2.5 milliGRAM(s) Nebulizer every 6 hours  apixaban 5 milliGRAM(s) Oral every 12 hours  artificial tears (preservative free) Ophthalmic Solution 1 Drop(s) Both EYES every 4 hours  atorvastatin 10 milliGRAM(s) Oral at bedtime  chlorhexidine 0.12% Liquid 15 milliLiter(s) Oral Mucosa every 12 hours  chlorhexidine 2% Cloths 1 Application(s) Topical daily  dextrose 5%. 1000 milliLiter(s) (50 mL/Hr) IV Continuous <Continuous>  dextrose 5%. 1000 milliLiter(s) (100 mL/Hr) IV Continuous <Continuous>  dextrose 50% Injectable 12.5 Gram(s) IV Push once  dextrose 50% Injectable 25 Gram(s) IV Push once  dextrose 50% Injectable 25 Gram(s) IV Push once  dextrose 50% Injectable 25 Gram(s) IV Push once  droxidopa 600 milliGRAM(s) Oral every 8 hours  epoetin odalis (EPOGEN) Injectable 88179 Unit(s) IV Push <User Schedule>  ferrous    sulfate Liquid 300 milliGRAM(s) Oral daily  glucagon  Injectable 1 milliGRAM(s) IntraMuscular once  insulin lispro (ADMELOG) corrective regimen sliding scale   SubCutaneous every 6 hours  insulin NPH human recombinant 6 Unit(s) SubCutaneous every 6 hours  levETIRAcetam  Solution 1000 milliGRAM(s) Oral daily  levETIRAcetam  Solution 250 milliGRAM(s) Oral <User Schedule>  midodrine. 40 milliGRAM(s) Oral <User Schedule>  norepinephrine Infusion 0.05 MICROgram(s)/kG/Min (6.23 mL/Hr) IV Continuous <Continuous>  pantoprazole  Injectable 40 milliGRAM(s) IV Push two times a day  petrolatum Ophthalmic Ointment 1 Application(s) Both EYES four times a day  sodium chloride 1 Gram(s) Oral two times a day    MEDICATIONS  (PRN):  acetaminophen   Oral Liquid .. 650 milliGRAM(s) Oral every 6 hours PRN Temp greater or equal to 38C (100.4F), Mild Pain (1 - 3)  dextrose Oral Gel 15 Gram(s) Oral once PRN Blood Glucose LESS THAN 70 milliGRAM(s)/deciliter  diphenhydrAMINE Elixir 50 milliGRAM(s) Oral once PRN Rash and/or Itching  droxidopa 200 milliGRAM(s) Oral <User Schedule> PRN Prior to hemodialysis  sodium chloride 0.9% Bolus. 250 milliLiter(s) IV Bolus every 5 minutes PRN SBP LESS THAN or EQUAL to 90 mmHg  sodium chloride 0.9% lock flush 10 milliLiter(s) IV Push every 1 hour PRN Pre/post blood products, medications, blood draw, and to maintain line patency      ALLERGIES:  Allergies    isoniazid (Rash)  nafcillin (Unknown)  hydrALAZINE (Rash)  vitamin E (Short breath; Urticaria; Hives)  doxycycline (Rash)  cefepime (Rash)  NIFEdipine (Urticaria; Hives)  Zosyn (Rash)    Intolerances        LABS:                        7.9    15.95 )-----------( 374      ( 30 Oct 2023 03:30 )             25.7     10-30    138  |  103  |  32<H>  ----------------------------<  146<H>  4.9   |  25  |  1.31<H>    Ca    8.7      30 Oct 2023 03:30  Phos  5.0     10-30  Mg     2.30     10-30    TPro  5.8<L>  /  Alb  1.9<L>  /  TBili  <0.2  /  DBili  x   /  AST  23  /  ALT  10  /  AlkPhos  226<H>  10-30    PTT - ( 30 Oct 2023 03:30 )  PTT:34.7 sec  Urinalysis Basic - ( 30 Oct 2023 03:30 )    Color: x / Appearance: x / SG: x / pH: x  Gluc: 146 mg/dL / Ketone: x  / Bili: x / Urobili: x   Blood: x / Protein: x / Nitrite: x   Leuk Esterase: x / RBC: x / WBC x   Sq Epi: x / Non Sq Epi: x / Bacteria: x       MICU Progress Note    INTERVAL HPI/OVERNIGHT EVENTS: No events overnight. Midodrine decreased from 40mg --> 20mg yesterday.     SUBJECTIVE: Patient seen and examined at bedside. Withdraws to painful stimuli, otherwise not participating meaningfully in interview/exam. Patient's daughter at bedside notes interval darkening of her R earlobe.     ROS: unable to assess    OBJECTIVE:    VITAL SIGNS:  ICU Vital Signs Last 24 Hrs  T(C): 37.2 (30 Oct 2023 04:00), Max: 37.8 (29 Oct 2023 08:00)  T(F): 99 (30 Oct 2023 04:00), Max: 100.1 (29 Oct 2023 20:00)  HR: 114 (30 Oct 2023 06:00) (103 - 124)  BP: 59/47 (30 Oct 2023 04:00) (59/47 - 146/44)  BP(mean): 54 (30 Oct 2023 04:00) (54 - 72)  ABP: 145/59 (30 Oct 2023 06:00) (119/52 - 153/63)  ABP(mean): 96 (30 Oct 2023 06:00) (78 - 102)  RR: 24 (30 Oct 2023 06:00) (14 - 24)  SpO2: 100% (30 Oct 2023 06:00) (89% - 100%)    O2 Parameters below as of 30 Oct 2023 06:00  Patient On (Oxygen Delivery Method): ventilator    O2 Concentration (%): 35      Mode: AC/ CMV (Assist Control/ Continuous Mandatory Ventilation), RR (machine): 24, FiO2: 35, PEEP: 8, ITime: 0.8, MAP: 19, PC: 35, PIP: 43    10-29 @ 07:01  -  10-30 @ 07:00  --------------------------------------------------------  IN: 1170 mL / OUT: 0 mL / NET: 1170 mL      CAPILLARY BLOOD GLUCOSE      POCT Blood Glucose.: 143 mg/dL (30 Oct 2023 05:48)      PHYSICAL EXAM:  General: NAD  HEENT: NC/AT; PERRL, clear conjunctiva; R ear lobe discoloration  Neck: supple  Respiratory: CTA b/l  Cardiovascular: +S1/S2; RRR  Abdomen: soft, NT/ND; +BS x4  Extremities: WWP, 2+ peripheral pulses b/l; diffusely anasarca   Skin: normal color and turgor; no rash  Neurological: AO*0      MEDICATIONS:  MEDICATIONS  (STANDING):  albuterol    0.083% 2.5 milliGRAM(s) Nebulizer every 6 hours  apixaban 5 milliGRAM(s) Oral every 12 hours  artificial tears (preservative free) Ophthalmic Solution 1 Drop(s) Both EYES every 4 hours  atorvastatin 10 milliGRAM(s) Oral at bedtime  chlorhexidine 0.12% Liquid 15 milliLiter(s) Oral Mucosa every 12 hours  chlorhexidine 2% Cloths 1 Application(s) Topical daily  dextrose 5%. 1000 milliLiter(s) (50 mL/Hr) IV Continuous <Continuous>  dextrose 5%. 1000 milliLiter(s) (100 mL/Hr) IV Continuous <Continuous>  dextrose 50% Injectable 12.5 Gram(s) IV Push once  dextrose 50% Injectable 25 Gram(s) IV Push once  dextrose 50% Injectable 25 Gram(s) IV Push once  dextrose 50% Injectable 25 Gram(s) IV Push once  droxidopa 600 milliGRAM(s) Oral every 8 hours  epoetin odalis (EPOGEN) Injectable 77516 Unit(s) IV Push <User Schedule>  ferrous    sulfate Liquid 300 milliGRAM(s) Oral daily  glucagon  Injectable 1 milliGRAM(s) IntraMuscular once  insulin lispro (ADMELOG) corrective regimen sliding scale   SubCutaneous every 6 hours  insulin NPH human recombinant 6 Unit(s) SubCutaneous every 6 hours  levETIRAcetam  Solution 1000 milliGRAM(s) Oral daily  levETIRAcetam  Solution 250 milliGRAM(s) Oral <User Schedule>  midodrine. 40 milliGRAM(s) Oral <User Schedule>  norepinephrine Infusion 0.05 MICROgram(s)/kG/Min (6.23 mL/Hr) IV Continuous <Continuous>  pantoprazole  Injectable 40 milliGRAM(s) IV Push two times a day  petrolatum Ophthalmic Ointment 1 Application(s) Both EYES four times a day  sodium chloride 1 Gram(s) Oral two times a day    MEDICATIONS  (PRN):  acetaminophen   Oral Liquid .. 650 milliGRAM(s) Oral every 6 hours PRN Temp greater or equal to 38C (100.4F), Mild Pain (1 - 3)  dextrose Oral Gel 15 Gram(s) Oral once PRN Blood Glucose LESS THAN 70 milliGRAM(s)/deciliter  diphenhydrAMINE Elixir 50 milliGRAM(s) Oral once PRN Rash and/or Itching  droxidopa 200 milliGRAM(s) Oral <User Schedule> PRN Prior to hemodialysis  sodium chloride 0.9% Bolus. 250 milliLiter(s) IV Bolus every 5 minutes PRN SBP LESS THAN or EQUAL to 90 mmHg  sodium chloride 0.9% lock flush 10 milliLiter(s) IV Push every 1 hour PRN Pre/post blood products, medications, blood draw, and to maintain line patency      ALLERGIES:  Allergies    isoniazid (Rash)  nafcillin (Unknown)  hydrALAZINE (Rash)  vitamin E (Short breath; Urticaria; Hives)  doxycycline (Rash)  cefepime (Rash)  NIFEdipine (Urticaria; Hives)  Zosyn (Rash)    Intolerances        LABS:                        7.9    15.95 )-----------( 374      ( 30 Oct 2023 03:30 )             25.7     10-30    138  |  103  |  32<H>  ----------------------------<  146<H>  4.9   |  25  |  1.31<H>    Ca    8.7      30 Oct 2023 03:30  Phos  5.0     10-30  Mg     2.30     10-30    TPro  5.8<L>  /  Alb  1.9<L>  /  TBili  <0.2  /  DBili  x   /  AST  23  /  ALT  10  /  AlkPhos  226<H>  10-30    PTT - ( 30 Oct 2023 03:30 )  PTT:34.7 sec  Urinalysis Basic - ( 30 Oct 2023 03:30 )    Color: x / Appearance: x / SG: x / pH: x  Gluc: 146 mg/dL / Ketone: x  / Bili: x / Urobili: x   Blood: x / Protein: x / Nitrite: x   Leuk Esterase: x / RBC: x / WBC x   Sq Epi: x / Non Sq Epi: x / Bacteria: x

## 2023-10-30 NOTE — PROGRESS NOTE ADULT - SUBJECTIVE AND OBJECTIVE BOX
S:  Pt seen and examined.       Medications: MEDICATIONS  (STANDING):  albuterol    0.083% 2.5 milliGRAM(s) Nebulizer every 6 hours  apixaban 5 milliGRAM(s) Oral every 12 hours  artificial tears (preservative free) Ophthalmic Solution 1 Drop(s) Both EYES every 4 hours  atorvastatin 10 milliGRAM(s) Oral at bedtime  chlorhexidine 0.12% Liquid 15 milliLiter(s) Oral Mucosa every 12 hours  chlorhexidine 2% Cloths 1 Application(s) Topical daily  dextrose 5%. 1000 milliLiter(s) (50 mL/Hr) IV Continuous <Continuous>  dextrose 5%. 1000 milliLiter(s) (100 mL/Hr) IV Continuous <Continuous>  dextrose 50% Injectable 12.5 Gram(s) IV Push once  dextrose 50% Injectable 25 Gram(s) IV Push once  dextrose 50% Injectable 25 Gram(s) IV Push once  dextrose 50% Injectable 25 Gram(s) IV Push once  droxidopa 600 milliGRAM(s) Oral every 8 hours  epoetin odalis (EPOGEN) Injectable 81069 Unit(s) IV Push <User Schedule>  ferrous    sulfate Liquid 300 milliGRAM(s) Oral daily  glucagon  Injectable 1 milliGRAM(s) IntraMuscular once  insulin lispro (ADMELOG) corrective regimen sliding scale   SubCutaneous every 6 hours  insulin NPH human recombinant 6 Unit(s) SubCutaneous every 6 hours  levETIRAcetam  Solution 1000 milliGRAM(s) Oral daily  levETIRAcetam  Solution 250 milliGRAM(s) Oral <User Schedule>  midodrine. 40 milliGRAM(s) Oral <User Schedule>  norepinephrine Infusion 0.05 MICROgram(s)/kG/Min (6.23 mL/Hr) IV Continuous <Continuous>  pantoprazole  Injectable 40 milliGRAM(s) IV Push two times a day  petrolatum Ophthalmic Ointment 1 Application(s) Both EYES four times a day  sodium chloride 1 Gram(s) Oral two times a day    MEDICATIONS  (PRN):  acetaminophen   Oral Liquid .. 650 milliGRAM(s) Oral every 6 hours PRN Temp greater or equal to 38C (100.4F), Mild Pain (1 - 3)  dextrose Oral Gel 15 Gram(s) Oral once PRN Blood Glucose LESS THAN 70 milliGRAM(s)/deciliter  diphenhydrAMINE Elixir 50 milliGRAM(s) Oral once PRN Rash and/or Itching  droxidopa 200 milliGRAM(s) Oral <User Schedule> PRN Prior to hemodialysis  sodium chloride 0.9% Bolus. 250 milliLiter(s) IV Bolus every 5 minutes PRN SBP LESS THAN or EQUAL to 90 mmHg  sodium chloride 0.9% lock flush 10 milliLiter(s) IV Push every 1 hour PRN Pre/post blood products, medications, blood draw, and to maintain line patency       Vitals:  Vital Signs Last 24 Hrs  T(C): 36.4 (30 Oct 2023 14:20), Max: 37.8 (29 Oct 2023 20:00)  T(F): 97.6 (30 Oct 2023 14:20), Max: 100.1 (29 Oct 2023 20:00)  HR: 129 (30 Oct 2023 15:45) (105 - 132)  BP: 71/49 (30 Oct 2023 15:30) (41/23 - 97/52)  BP(mean): 56 (30 Oct 2023 15:30) (30 - 66)  RR: 21 (30 Oct 2023 15:45) (14 - 24)  SpO2: 100% (30 Oct 2023 15:45) (91% - 100%)    Parameters below as of 30 Oct 2023 14:20  Patient On (Oxygen Delivery Method): ventilator    O2 Concentration (%): 35          Neurological Exam:  Mental Status: Eyes closed, minimal spont eye opening off sedation. Does not follow commands,  + trach to vent and NGT   Cranial Nerves: PERRL slightly sluggish, L subconjunctival hemorrhage improved. R facial twitching noted by mouth - ? suppressible. occasional head dystonia  Motor: Moves upper extremities spontaneously R >L, tremor R > L  no movement noted in lowers  Sensation: WD to noxious x4    I personally reviewed the below data/images/labs:      Medications: MEDICATIONS  (STANDING):  albuterol    0.083% 2.5 milliGRAM(s) Nebulizer every 6 hours  apixaban 5 milliGRAM(s) Oral every 12 hours  artificial tears (preservative free) Ophthalmic Solution 1 Drop(s) Both EYES every 4 hours  atorvastatin 10 milliGRAM(s) Oral at bedtime  chlorhexidine 0.12% Liquid 15 milliLiter(s) Oral Mucosa every 12 hours  chlorhexidine 2% Cloths 1 Application(s) Topical daily  dextrose 5%. 1000 milliLiter(s) (50 mL/Hr) IV Continuous <Continuous>  dextrose 5%. 1000 milliLiter(s) (100 mL/Hr) IV Continuous <Continuous>  dextrose 50% Injectable 12.5 Gram(s) IV Push once  dextrose 50% Injectable 25 Gram(s) IV Push once  dextrose 50% Injectable 25 Gram(s) IV Push once  dextrose 50% Injectable 25 Gram(s) IV Push once  droxidopa 600 milliGRAM(s) Oral every 8 hours  epoetin odalis (EPOGEN) Injectable 96694 Unit(s) IV Push <User Schedule>  ferrous    sulfate Liquid 300 milliGRAM(s) Oral daily  glucagon  Injectable 1 milliGRAM(s) IntraMuscular once  insulin lispro (ADMELOG) corrective regimen sliding scale   SubCutaneous every 6 hours  insulin NPH human recombinant 6 Unit(s) SubCutaneous every 6 hours  levETIRAcetam  Solution 1000 milliGRAM(s) Oral daily  levETIRAcetam  Solution 250 milliGRAM(s) Oral <User Schedule>  midodrine. 40 milliGRAM(s) Oral <User Schedule>  norepinephrine Infusion 0.05 MICROgram(s)/kG/Min (6.23 mL/Hr) IV Continuous <Continuous>  pantoprazole  Injectable 40 milliGRAM(s) IV Push two times a day  petrolatum Ophthalmic Ointment 1 Application(s) Both EYES four times a day  sodium chloride 1 Gram(s) Oral two times a day    MEDICATIONS  (PRN):  acetaminophen   Oral Liquid .. 650 milliGRAM(s) Oral every 6 hours PRN Temp greater or equal to 38C (100.4F), Mild Pain (1 - 3)  dextrose Oral Gel 15 Gram(s) Oral once PRN Blood Glucose LESS THAN 70 milliGRAM(s)/deciliter  diphenhydrAMINE Elixir 50 milliGRAM(s) Oral once PRN Rash and/or Itching  droxidopa 200 milliGRAM(s) Oral <User Schedule> PRN Prior to hemodialysis  sodium chloride 0.9% Bolus. 250 milliLiter(s) IV Bolus every 5 minutes PRN SBP LESS THAN or EQUAL to 90 mmHg  sodium chloride 0.9% lock flush 10 milliLiter(s) IV Push every 1 hour PRN Pre/post blood products, medications, blood draw, and to maintain line patency       Vitals:  Vital Signs Last 24 Hrs  T(C): 36.4 (30 Oct 2023 14:20), Max: 37.8 (29 Oct 2023 20:00)  T(F): 97.6 (30 Oct 2023 14:20), Max: 100.1 (29 Oct 2023 20:00)  HR: 129 (30 Oct 2023 15:45) (105 - 132)  BP: 71/49 (30 Oct 2023 15:30) (41/23 - 97/52)  BP(mean): 56 (30 Oct 2023 15:30) (30 - 66)  RR: 21 (30 Oct 2023 15:45) (14 - 24)  SpO2: 100% (30 Oct 2023 15:45) (91% - 100%)    Parameters below as of 30 Oct 2023 14:20  Patient On (Oxygen Delivery Method): ventilator    O2 Concentration (%): 35          < from: CT Head No Cont (08.15.23 @ 17:20) >    ACC: [id number] EXAM:  CT BRAIN   ORDERED BY: MINAL SAM     PROCEDURE DATE:  08/15/2023          INTERPRETATION:  Clinical indication: Change in neuro exam.    Multiple axial sections were performed from base to vertex without   contrast enhancement. Coronal and sagittal reconstructions were   reformatted well.    This exam is compared prior head CT performed on August 11, 2023    Parenchymal volume loss and chronic microvessel ischemic changes are   again seen.    Abnormal low-attenuation involving the left occipital cortical   subcortical region is again seen. 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    No evidence of acute hemorrhage mass or mass effect is seen.    Evaluation of the osseous structures with appropriate window demonstrates   sclerotic changes about the left mastoid region which appears stable.   Opacification left middle ear region is again seen.    Patient is status post bilateral cataract surgery.    Impression: Stable exam.    < end of copied text >    vEEG:    Clinical Impression:  - Severe diffuse non-specific cerebral dysfunction  - There were no epileptiform abnormalities or seizures recorded.        CTH 8/11:    VENTRICLES AND SULCI: Age-appropriate involutional change  INTRA-AXIAL:  Old left PCA infarct as seen on the prior unchanged.   Microvascular ischemic changes involving the periventricular and   subcortical white matter as seen previously  EXTRA-AXIAL:  No mass or collection is seen.  VISUALIZED SINUSES:  Clear.  VISUALIZED MASTOIDS: Left mastoid sclerosis  CALVARIUM: Infiltrative appearance tothe calvarium may be indicative of   marrow infiltration on the basis of patient's known diagnosis of breast   cancer. MISCELLANEOUS:  None.    IMPRESSION:  No significant interval change compared with 7/17/2023 in   left PCA infarct which is old. Microvascular ischemic changes involving   the periventricular and subcortical white matter as seen   previously.Questionable lesions at the level of the calvarium related to   possible breast CA. Clinical correlation recommended.    --- End of Report ---      ct< from: CT Head No Cont (09.26.23 @ 21:02) >  IMPRESSION: Vague questionable areas of hypoattenuation within the   bilateral cerebellar hemispheres which may be compatible with   acute/subacute ischemia. Recommend further evaluation with a brain MRI   study, provided there are no MRI contraindications.    No acute intracranial hemorrhage.    Previously seen questionable vague wedge-shaped area of hypoattenuation   in the left parietal lobe with artifactual secondary to motion and volume   averaging with a prominent sulcus.    Chronic left occipital lobe infarct.    < end of copied text >    < from: CT Head No Cont (10.03.23 @ 20:46) >    IMPRESSION:    No acute intracranial hemorrhage or mass effect. Evaluation of the   posterior fossa degraded by streak artifact from dental amalgam.   Lucencies in the bilateral cerebellum may be artifactual.    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    Bilateral middle ear and mastoid effusions which are also seen on prior   exam.    EEG Classification / Summary:  Abnormal EEG in the awake, drowsy states.   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   -No seizures

## 2023-10-30 NOTE — PROGRESS NOTE ADULT - SUBJECTIVE AND OBJECTIVE BOX
Patient is a 73y old  Female who presents with a chief complaint of Respiratory distress (30 Oct 2023 16:07)      SUBJECTIVE / OVERNIGHT EVENTS: right facial twiching ongoing, afebrile off ABx    MEDICATIONS  (STANDING):  albuterol    0.083% 2.5 milliGRAM(s) Nebulizer every 6 hours  apixaban 5 milliGRAM(s) Oral every 12 hours  artificial tears (preservative free) Ophthalmic Solution 1 Drop(s) Both EYES every 4 hours  atorvastatin 10 milliGRAM(s) Oral at bedtime  chlorhexidine 0.12% Liquid 15 milliLiter(s) Oral Mucosa every 12 hours  chlorhexidine 2% Cloths 1 Application(s) Topical daily  dextrose 5%. 1000 milliLiter(s) (50 mL/Hr) IV Continuous <Continuous>  dextrose 5%. 1000 milliLiter(s) (100 mL/Hr) IV Continuous <Continuous>  dextrose 50% Injectable 12.5 Gram(s) IV Push once  dextrose 50% Injectable 25 Gram(s) IV Push once  dextrose 50% Injectable 25 Gram(s) IV Push once  dextrose 50% Injectable 25 Gram(s) IV Push once  droxidopa 600 milliGRAM(s) Oral every 8 hours  epoetin odalis (EPOGEN) Injectable 45811 Unit(s) IV Push <User Schedule>  ferrous    sulfate Liquid 300 milliGRAM(s) Oral daily  glucagon  Injectable 1 milliGRAM(s) IntraMuscular once  insulin lispro (ADMELOG) corrective regimen sliding scale   SubCutaneous every 6 hours  insulin NPH human recombinant 6 Unit(s) SubCutaneous every 6 hours  levETIRAcetam  Solution 1000 milliGRAM(s) Oral daily  levETIRAcetam  Solution 250 milliGRAM(s) Oral <User Schedule>  midodrine. 40 milliGRAM(s) Oral <User Schedule>  norepinephrine Infusion 0.05 MICROgram(s)/kG/Min (6.23 mL/Hr) IV Continuous <Continuous>  pantoprazole  Injectable 40 milliGRAM(s) IV Push two times a day  petrolatum Ophthalmic Ointment 1 Application(s) Both EYES four times a day  sodium chloride 1 Gram(s) Oral two times a day    MEDICATIONS  (PRN):  acetaminophen   Oral Liquid .. 650 milliGRAM(s) Oral every 6 hours PRN Temp greater or equal to 38C (100.4F), Mild Pain (1 - 3)  dextrose Oral Gel 15 Gram(s) Oral once PRN Blood Glucose LESS THAN 70 milliGRAM(s)/deciliter  diphenhydrAMINE Elixir 50 milliGRAM(s) Oral once PRN Rash and/or Itching  droxidopa 200 milliGRAM(s) Oral <User Schedule> PRN Prior to hemodialysis  sodium chloride 0.9% Bolus. 250 milliLiter(s) IV Bolus every 5 minutes PRN SBP LESS THAN or EQUAL to 90 mmHg  sodium chloride 0.9% lock flush 10 milliLiter(s) IV Push every 1 hour PRN Pre/post blood products, medications, blood draw, and to maintain line patency      Vital Signs Last 24 Hrs  T(F): 99.3 (10-30-23 @ 20:00), Max: 99.7 (10-30-23 @ 00:00)  HR: 118 (10-30-23 @ 20:00) (105 - 132)  BP: 67/34 (10-30-23 @ 16:00) (41/23 - 97/52)  RR: 24 (10-30-23 @ 20:00) (14 - 24)  SpO2: 100% (10-30-23 @ 20:00) (91% - 100%)  Telemetry:   CAPILLARY BLOOD GLUCOSE      POCT Blood Glucose.: 141 mg/dL (30 Oct 2023 18:04)  POCT Blood Glucose.: 146 mg/dL (30 Oct 2023 12:51)  POCT Blood Glucose.: 143 mg/dL (30 Oct 2023 05:48)  POCT Blood Glucose.: 156 mg/dL (30 Oct 2023 00:08)    I&O's Summary    29 Oct 2023 07:01  -  30 Oct 2023 07:00  --------------------------------------------------------  IN: 1210 mL / OUT: 0 mL / NET: 1210 mL    30 Oct 2023 07:01  -  30 Oct 2023 20:20  --------------------------------------------------------  IN: 1034.5 mL / OUT: 2000 mL / NET: -965.5 mL        PHYSICAL EXAM:  GENERAL: NAD, well-developed  HEAD:  Atraumatic, Normocephalic  EYES: EOMI, PERRLA, conjunctiva and sclera clear  NECK: Supple, No JVD  CHEST/LUNG: Clear to auscultation bilaterally; No wheeze  HEART: Regular rate and rhythm; No murmurs, rubs, or gallops  ABDOMEN: Soft, Nontender, Nondistended; Bowel sounds present  EXTREMITIES:  2+ Peripheral Pulses, No clubbing, cyanosis, or edema  PSYCH: AAOx3  NEUROLOGY: non-focal  SKIN: No rashes or lesions    LABS:                        7.9    15.95 )-----------( 374      ( 30 Oct 2023 03:30 )             25.7     10-30    138  |  103  |  32<H>  ----------------------------<  146<H>  4.9   |  25  |  1.31<H>    Ca    8.7      30 Oct 2023 03:30  Phos  5.0     10-30  Mg     2.30     10-30    TPro  5.8<L>  /  Alb  1.9<L>  /  TBili  <0.2  /  DBili  x   /  AST  23  /  ALT  10  /  AlkPhos  226<H>  10-30    PTT - ( 30 Oct 2023 03:30 )  PTT:34.7 sec      Urinalysis Basic - ( 30 Oct 2023 03:30 )    Color: x / Appearance: x / SG: x / pH: x  Gluc: 146 mg/dL / Ketone: x  / Bili: x / Urobili: x   Blood: x / Protein: x / Nitrite: x   Leuk Esterase: x / RBC: x / WBC x   Sq Epi: x / Non Sq Epi: x / Bacteria: x        RADIOLOGY & ADDITIONAL TESTS:    Imaging Personally Reviewed:    Consultant(s) Notes Reviewed:      Care Discussed with Consultants/Other Providers:

## 2023-10-30 NOTE — PROGRESS NOTE ADULT - ASSESSMENT
72 y/o F well known to me from my [facility] outpt practice. she was admitted at [facility] 7/12-7/22 w aspiration PNA, was treated w CEFEPIME, developed an allergic rash,  dCHF, + MAC on AFB culture, had been progressively getting more and more lethargic and dyspneic at home since DC.   In  am of 8/11/23  ptn presented with respiratory distress w hypoxia and hypercarbia requiring intubation 2/2 volume overload +/- Asp PNA      Neuro   not responsive  Baseline MS AOx3, aphasic   - h/o CVA , on aspirin and statin . resumed w feeding tube, ASA resumed 8/26  - eeg  2/2 tremors, no sz focus, right facial twitching, on KEPPRA  - less responsive in the past few days  - MRI 8/17:  new R cerebellar infarct, old left PCA/Occipital infarct. probably embolic in nature, did not tolerate full AC in the past, STEPHANIE is neg , no shunts observed  - ptn is poorly reactive, rpt scan done, no further CVA seen.     Cardiac   cardiology following  CHFpEF   TTE 7/2023 with EF 59%, with severe LVH and diastolic dysfunction   on Levo drip 2/2 hypotension   on midodrine 40 mg qid, droxidopa 600 tid, additional 200 mg prior to HD,     Pulmonary   Acute hypercapnic and hypoxemic respiratory failure   prolonged intubation, now  trache to  vent, has copious secretions      Renal   on HD via L IJ Shiley, tunneled catheter when clinically stable  HD MWF, midodrine assisted, PUF in between HD days, unable to remove proper volume 2/2 hypotension.   permacath when clinically stable      GI  NPO  on tube feeds  coffee ground emesis x 1 8/24, melena overnight 8/31, s/p 1UPRBC, EGD/Colonoscopy: erosive gastropathy, esophagisits, on colonoscopy old blood seen no active bleeding, poor prep  family to decide re PEG placement    Endocrine  on Insulin: NPH, Admelog    ID   complicated infectious hospital course  treated for MSSA bacteremia, aspiration PNA.  sputum + for pseudomonas, ptn was initially on zosyn but was allergic, then was switched to aztreonam   Aztreonam was switched to polymyxin for a possible allergy but ptn didnt have a reaction to aztreonam, she had a reaction to Zosyn.   spike temps again while off Abx, Aztreonam resumed 10/17, DCed 10/29      ID course complicated with multiple ABx allergies  also treated for Left O. 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 and she remains full code at present

## 2023-10-30 NOTE — PROGRESS NOTE ADULT - ASSESSMENT
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       NEUROLOGY  #AMS  Multiple etiologies including active infection vs CVA.   - MR head performed w/ new infarct and old infarcts, EEG without seizure events but with high foci potential   - c/w ASA, lipitor  - c/w keppra    - will obtain another MRI when stable     CARDIOVASCULAR  # Septic vs vasoplegic shock   Etiology likely septic iso active infection. 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   - resolved    RESPIRATORY  # AHRF second to volume overload   - CKD vs HFpEF w/ hypercarbia 2/2 volume overload requiring intubation, trach, and HD initiation  - Continue on albuterol and chest PT  - Continue volume removal with HD      #tracheomalacia   - CT CHEST (9/8) with tracheomalacia, BL pleural effusions and continued consolidations     #mechanical ventilation   - PC 24/43/8/35     GI  # UGIB   - Continue on PPI BID    # Dysphagia   - NGT-TF     RENAL  # ESRD  - HD MWF TIW with midodrine and droxidopa  - ICU for vasopressor assisted volume removal, cap to 1 pressor and not offering CRRT due to comorbidities and prognosis   - will titrate droxidopa to maintain off pressors during dialysis     INFECTIOUS DISEASE    # 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 Coli sputum Cx, BAL w/ MSSA  - treated with abx   - eosinophilia workup     # Proteus and  PSA VAP/ Trachitis   - Overall difficulty with ABX tailoring given allergic rxns and resistant organisms  - dc aztreonam    # MAC   - outpatient treatment    HEME  # Anemia second to GIB vs renal disease   - received transfusions during stay  - management as per renal, PPI    VASCULAR   # LLE POP DVT   - on Eliquis    # HD access  - TRISHA T[facility]OR (9/27 - 10/21)  - [employer]CHIPSKY[employer] Scholrlyfabi replaced for HD     ONC   # Hx of Breast CA   - Patient dx in 2018 and s/p BL mastectomy (radical on right) and chemo and RT.     ENDOCRINE  # IDDM2   - Continued on NPH with ISS, however noted with hypoglycemic episodes and NPH dc'ed.    - Finger sticks trending up off NPH.   - Continue on NPH 6U with moderate ISS.   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       NEUROLOGY  #AMS  Multiple etiologies including active infection vs CVA.   MR head performed w/ new infarct and old infarcts, EEG without seizure events but with high foci potential   - f/u repeat MRI --> will f/u with neuro about timing of this  - continue lipitor  - continue keppra given above  - aspirin held 10/11, likely in s/o GIB, Hgb stable not requiring pRBC since 10/14      CARDIOVASCULAR  # Septic vs vasoplegic shock   Etiology likely septic iso active infection. Possible component of vasoplegic, however no longer with active infection or on sedation. Off IV vasopressors.   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   - resolved    RESPIRATORY  # AHRF second to volume overload   - CKD vs HFpEF w/ hypercarbia 2/2 volume overload requiring intubation, trach, and HD initiation  - Continue on albuterol and chest PT  - Continue volume removal with HD      #tracheomalacia   - CT CHEST (9/8) with tracheomalacia, BL pleural effusions and continued consolidations     #mechanical ventilation   - PC 24/43/8/35     GI  # UGIB   - Continue on PPI BID    # Dysphagia   - NGT-TF     RENAL  # ESRD  - HD MWF TIW with midodrine and droxidopa  - ICU for vasopressor assisted volume removal, cap to 1 pressor and not offering CRRT due to comorbidities and prognosis   - will titrate droxidopa to maintain off pressors during dialysis     INFECTIOUS DISEASE    # 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 Coli sputum Cx, BAL w/ MSSA  - treated with abx   - eosinophilia workup     # Proteus and  PSA VAP/ Trachitis   - Overall difficulty with ABX tailoring given allergic rxns and resistant organisms  - dc aztreonam    # MAC   - outpatient treatment    HEME  # Anemia second to GIB vs renal disease   - received transfusions during stay  - management as per renal, PPI    VASCULAR   # LLE POP DVT   - on Eliquis    # HD access  - [facility] (9/27 - 10/21)  - TRISHA taylor replaced for HD     ONC   # Hx of Breast CA   - Patient dx in 2018 and s/p BL mastectomy (radical on right) and chemo and RT.     ENDOCRINE  # IDDM2   - Continued on NPH with ISS, however noted with hypoglycemic episodes and NPH dc'ed.    - Finger sticks trending up off NPH.   - Continue on NPH 6U with moderate ISS.   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       NEUROLOGY  #AMS  Multiple etiologies including active infection vs CVA.   MR head performed w/ new infarct and old infarcts, EEG without seizure events but with high foci potential   - f/u repeat MRI --> will f/u with neuro about timing of this  - continue lipitor  - continue keppra given above  - aspirin held 10/11, likely in s/o GIB, Hgb stable not requiring pRBC since 10/14      CARDIOVASCULAR  # Septic vs vasoplegic shock   Etiology likely septic iso active infection. Possible component of vasoplegic, however no longer with active infection or on sedation. Off IV vasopressors.   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   - resolved    RESPIRATORY  # AHRF second to volume overload   - CKD vs HFpEF w/ hypercarbia 2/2 volume overload requiring intubation, trach, and HD initiation  - Continue on albuterol and chest PT  - Continue volume removal with HD      #tracheomalacia   - CT CHEST (9/8) with tracheomalacia, BL pleural effusions and continued consolidations     #mechanical ventilation   - PC 24/43/8/35     GI  # UGIB: last pRBC transfused 10/14  - Continue on PPI BID    # Dysphagia   - NGT-TF     RENAL  # ESRD  - HD MWF TIW with midodrine and droxidopa  - ICU for vasopressor assisted volume removal, cap to 1 pressor and not offering CRRT due to comorbidities and prognosis   - will titrate droxidopa to maintain off pressors during dialysis     INFECTIOUS DISEASE  # 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 Coli sputum Cx, BAL w/ MSSA  - treated with abx   - eosinophilia workup     # Proteus and  PSA VAP/ Trachitis   - Overall difficulty with ABX tailoring given allergic rxns and resistant organisms  - dc aztreonam    # MAC   - outpatient treatment    HEME  # Anemia second to GIB vs renal disease   - received transfusions during stay  - management as per renal, PPI    VASCULAR   # LLE POP DVT   - on Eliquis    # HD access  - TR[facility] (9/27 - 10/21)  - TRISHA taylor replaced for HD     ONC   # Hx of Breast CA   - Patient dx in 2018 and s/p BL mastectomy (radical on right) and chemo and RT.     ENDOCRINE  # IDDM2   - Continued on NPH with ISS, however noted with hypoglycemic episodes and NPH dc'ed.    - Finger sticks trending up off NPH.   - Continue on NPH 6U with moderate ISS.   - Adjust as need     SKIN  # Drug Eruption   - treated    ETHICS/ GOC    - Attempted palliative discussion however family not interested and wishes for FULL CODE  - Family continues to want everything done despite HD failure on multiple oral pressor

## 2023-10-31 NOTE — PROGRESS NOTE ADULT - SUBJECTIVE AND OBJECTIVE BOX
NEPHROLOGY     Patient seen and examined.    MEDICATIONS  (STANDING):  albuterol    0.083% 2.5 milliGRAM(s) Nebulizer every 6 hours  apixaban 5 milliGRAM(s) Oral every 12 hours  artificial tears (preservative free) Ophthalmic Solution 1 Drop(s) Both EYES every 4 hours  atorvastatin 10 milliGRAM(s) Oral at bedtime  chlorhexidine 0.12% Liquid 15 milliLiter(s) Oral Mucosa every 12 hours  chlorhexidine 2% Cloths 1 Application(s) Topical daily  ciprofloxacin/dexamethasone Suspension Otic 4 Drop(s) Left Ear two times a day  dextrose 5%. 1000 milliLiter(s) (50 mL/Hr) IV Continuous <Continuous>  dextrose 5%. 1000 milliLiter(s) (100 mL/Hr) IV Continuous <Continuous>  dextrose 50% Injectable 12.5 Gram(s) IV Push once  dextrose 50% Injectable 25 Gram(s) IV Push once  dextrose 50% Injectable 25 Gram(s) IV Push once  dextrose 50% Injectable 25 Gram(s) IV Push once  droxidopa 600 milliGRAM(s) Oral every 8 hours  epoetin odalis (EPOGEN) Injectable 90526 Unit(s) IV Push <User Schedule>  ferrous    sulfate Liquid 300 milliGRAM(s) Oral daily  glucagon  Injectable 1 milliGRAM(s) IntraMuscular once  insulin lispro (ADMELOG) corrective regimen sliding scale   SubCutaneous every 6 hours  insulin NPH human recombinant 6 Unit(s) SubCutaneous every 6 hours  levETIRAcetam  Solution 1000 milliGRAM(s) Oral daily  levETIRAcetam  Solution 250 milliGRAM(s) Oral <User Schedule>  midodrine. 40 milliGRAM(s) Oral <User Schedule>  norepinephrine Infusion 0.05 MICROgram(s)/kG/Min (6.23 mL/Hr) IV Continuous <Continuous>  pantoprazole  Injectable 40 milliGRAM(s) IV Push two times a day  petrolatum Ophthalmic Ointment 1 Application(s) Both EYES four times a day  sodium chloride 1 Gram(s) Oral two times a day    VITALS:  T(C): , Max: 37.4 (10-30-23 @ 20:00)  T(F): , Max: 99.4 (10-31-23 @ 08:00)  HR: 113 (10-31-23 @ 12:00)  BP: 105/52 (10-31-23 @ 08:00)  BP(mean): 69 (10-31-23 @ 08:00)  RR: 24 (10-31-23 @ 12:00)  SpO2: 98% (10-31-23 @ 12:00)    PHYSICAL EXAM:    Constitutional: critically ill, trach to vent   HEENT: + NGT, + tracheostomy   Respiratory: coarse BS  Cardiovascular: S1 and S2  Gastrointestinal: BS+, soft, NT/ND  Extremities: ++ peripheral edema  Neurological: UTO  : No Wheeler  Skin: No rashes  Access: [facility]     LABS:                        7.2    13.59 )-----------( 372      ( 31 Oct 2023 02:55 )             24.0     10-31    140  |  103  |  22  ----------------------------<  130<H>  4.3   |  28  |  1.10    Ca    8.5      31 Oct 2023 02:55  Phos  3.9     10-31  Mg     2.10     10-31    TPro  5.7<L>  /  Alb  1.8<L>  /  TBili  <0.2  /  DBili  x   /  AST  17  /  ALT  7   /  AlkPhos  207<H>  10-31   NEPHROLOGY     Patient seen and examined with spouse at bedside, trach to vent, HD yesterday removed 1.6L.    MEDICATIONS  (STANDING):  albuterol    0.083% 2.5 milliGRAM(s) Nebulizer every 6 hours  apixaban 5 milliGRAM(s) Oral every 12 hours  artificial tears (preservative free) Ophthalmic Solution 1 Drop(s) Both EYES every 4 hours  atorvastatin 10 milliGRAM(s) Oral at bedtime  chlorhexidine 0.12% Liquid 15 milliLiter(s) Oral Mucosa every 12 hours  chlorhexidine 2% Cloths 1 Application(s) Topical daily  ciprofloxacin/dexamethasone Suspension Otic 4 Drop(s) Left Ear two times a day  dextrose 5%. 1000 milliLiter(s) (50 mL/Hr) IV Continuous <Continuous>  dextrose 5%. 1000 milliLiter(s) (100 mL/Hr) IV Continuous <Continuous>  dextrose 50% Injectable 12.5 Gram(s) IV Push once  dextrose 50% Injectable 25 Gram(s) IV Push once  dextrose 50% Injectable 25 Gram(s) IV Push once  dextrose 50% Injectable 25 Gram(s) IV Push once  droxidopa 600 milliGRAM(s) Oral every 8 hours  epoetin odalis (EPOGEN) Injectable 99002 Unit(s) IV Push <User Schedule>  ferrous    sulfate Liquid 300 milliGRAM(s) Oral daily  glucagon  Injectable 1 milliGRAM(s) IntraMuscular once  insulin lispro (ADMELOG) corrective regimen sliding scale   SubCutaneous every 6 hours  insulin NPH human recombinant 6 Unit(s) SubCutaneous every 6 hours  levETIRAcetam  Solution 1000 milliGRAM(s) Oral daily  levETIRAcetam  Solution 250 milliGRAM(s) Oral <User Schedule>  midodrine. 40 milliGRAM(s) Oral <User Schedule>  norepinephrine Infusion 0.05 MICROgram(s)/kG/Min (6.23 mL/Hr) IV Continuous <Continuous>  pantoprazole  Injectable 40 milliGRAM(s) IV Push two times a day  petrolatum Ophthalmic Ointment 1 Application(s) Both EYES four times a day  sodium chloride 1 Gram(s) Oral two times a day    VITALS:  T(C): , Max: 37.4 (10-30-23 @ 20:00)  T(F): , Max: 99.4 (10-31-23 @ 08:00)  HR: 113 (10-31-23 @ 12:00)  BP: 105/52 (10-31-23 @ 08:00)  BP(mean): 69 (10-31-23 @ 08:00)  RR: 24 (10-31-23 @ 12:00)  SpO2: 98% (10-31-23 @ 12:00)    PHYSICAL EXAM:    Constitutional: critically ill, trach to vent   HEENT: + NGT, + tracheostomy   Respiratory: coarse BS  Cardiovascular: tachy   Gastrointestinal: BS+, soft, NT/ND  Extremities: ++ peripheral edema  Neurological: UTO  : No Wheeler  Skin: No rashes  Access: [facility]     LABS:                        7.2    13.59 )-----------( 372      ( 31 Oct 2023 02:55 )             24.0     10-31    140  |  103  |  22  ----------------------------<  130<H>  4.3   |  28  |  1.10    Ca    8.5      31 Oct 2023 02:55  Phos  3.9     10-31  Mg     2.10     10-31    TPro  5.7<L>  /  Alb  1.8<L>  /  TBili  <0.2  /  DBili  x   /  AST  17  /  ALT  7   /  AlkPhos  207<H>  10-31

## 2023-10-31 NOTE — PROGRESS NOTE ADULT - SUBJECTIVE AND OBJECTIVE BOX
MICU Progress Note    INTERVAL HPI/OVERNIGHT EVENTS:    SUBJECTIVE: Patient seen and examined at bedside.     CONSTITUTIONAL: No weakness, fevers or chills  EYES/ENT: No visual changes;  No vertigo or throat pain   NECK: No pain or stiffness  RESPIRATORY: No cough, wheezing, hemoptysis; No shortness of breath  CARDIOVASCULAR: No chest pain or palpitations  GASTROINTESTINAL: No abdominal or epigastric pain. No nausea, vomiting, or hematemesis; No diarrhea or constipation. No melena or hematochezia.  GENITOURINARY: No dysuria, frequency or hematuria  NEUROLOGICAL: No numbness or weakness  SKIN: No itching, rashes    OBJECTIVE:    VITAL SIGNS:  ICU Vital Signs Last 24 Hrs  T(C): 37.2 (31 Oct 2023 04:00), Max: 37.4 (30 Oct 2023 20:00)  T(F): 98.9 (31 Oct 2023 04:00), Max: 99.3 (30 Oct 2023 20:00)  HR: 118 (31 Oct 2023 06:00) (101 - 132)  BP: 67/34 (30 Oct 2023 16:00) (41/23 - 94/35)  BP(mean): 44 (30 Oct 2023 16:00) (30 - 62)  ABP: 131/50 (31 Oct 2023 06:00) (66/42 - 180/68)  ABP(mean): 84 (31 Oct 2023 06:00) (48 - 116)  RR: 21 (31 Oct 2023 06:00) (11 - 27)  SpO2: 100% (31 Oct 2023 06:00) (91% - 100%)    O2 Parameters below as of 31 Oct 2023 06:00  Patient On (Oxygen Delivery Method): ventilator    O2 Concentration (%): 60      Mode: AC/ CMV (Assist Control/ Continuous Mandatory Ventilation), RR (machine): 24, FiO2: 60, PEEP: 8, ITime: 0.8, MAP: 20, PC: 35, PIP: 43    10-30 @ 07:01  -  10-31 @ 07:00  --------------------------------------------------------  IN: 1434.5 mL / OUT: 2000 mL / NET: -565.5 mL      CAPILLARY BLOOD GLUCOSE      POCT Blood Glucose.: 132 mg/dL (31 Oct 2023 05:12)      PHYSICAL EXAM:    General: NAD  HEENT: NC/AT; PERRL, clear conjunctiva  Neck: supple  Respiratory: CTA b/l  Cardiovascular: +S1/S2; RRR  Abdomen: soft, NT/ND; +BS x4  Extremities: WWP, 2+ peripheral pulses b/l; no LE edema  Skin: normal color and turgor; no rash  Neurological:    MEDICATIONS:  MEDICATIONS  (STANDING):  albuterol    0.083% 2.5 milliGRAM(s) Nebulizer every 6 hours  apixaban 5 milliGRAM(s) Oral every 12 hours  artificial tears (preservative free) Ophthalmic Solution 1 Drop(s) Both EYES every 4 hours  atorvastatin 10 milliGRAM(s) Oral at bedtime  chlorhexidine 0.12% Liquid 15 milliLiter(s) Oral Mucosa every 12 hours  chlorhexidine 2% Cloths 1 Application(s) Topical daily  dextrose 5%. 1000 milliLiter(s) (100 mL/Hr) IV Continuous <Continuous>  dextrose 5%. 1000 milliLiter(s) (50 mL/Hr) IV Continuous <Continuous>  dextrose 50% Injectable 12.5 Gram(s) IV Push once  dextrose 50% Injectable 25 Gram(s) IV Push once  dextrose 50% Injectable 25 Gram(s) IV Push once  dextrose 50% Injectable 25 Gram(s) IV Push once  droxidopa 600 milliGRAM(s) Oral every 8 hours  epoetin odalis (EPOGEN) Injectable 27175 Unit(s) IV Push <User Schedule>  ferrous    sulfate Liquid 300 milliGRAM(s) Oral daily  glucagon  Injectable 1 milliGRAM(s) IntraMuscular once  insulin lispro (ADMELOG) corrective regimen sliding scale   SubCutaneous every 6 hours  insulin NPH human recombinant 6 Unit(s) SubCutaneous every 6 hours  levETIRAcetam  Solution 1000 milliGRAM(s) Oral daily  levETIRAcetam  Solution 250 milliGRAM(s) Oral <User Schedule>  midodrine. 40 milliGRAM(s) Oral <User Schedule>  norepinephrine Infusion 0.05 MICROgram(s)/kG/Min (6.23 mL/Hr) IV Continuous <Continuous>  pantoprazole  Injectable 40 milliGRAM(s) IV Push two times a day  petrolatum Ophthalmic Ointment 1 Application(s) Both EYES four times a day  sodium chloride 1 Gram(s) Oral two times a day    MEDICATIONS  (PRN):  acetaminophen   Oral Liquid .. 650 milliGRAM(s) Oral every 6 hours PRN Temp greater or equal to 38C (100.4F), Mild Pain (1 - 3)  dextrose Oral Gel 15 Gram(s) Oral once PRN Blood Glucose LESS THAN 70 milliGRAM(s)/deciliter  diphenhydrAMINE Elixir 50 milliGRAM(s) Oral once PRN Rash and/or Itching  droxidopa 200 milliGRAM(s) Oral <User Schedule> PRN Prior to hemodialysis  sodium chloride 0.9% Bolus. 250 milliLiter(s) IV Bolus every 5 minutes PRN SBP LESS THAN or EQUAL to 90 mmHg  sodium chloride 0.9% lock flush 10 milliLiter(s) IV Push every 1 hour PRN Pre/post blood products, medications, blood draw, and to maintain line patency      ALLERGIES:  Allergies    isoniazid (Rash)  nafcillin (Unknown)  hydrALAZINE (Rash)  vitamin E (Short breath; Urticaria; Hives)  doxycycline (Rash)  cefepime (Rash)  NIFEdipine (Urticaria; Hives)  Zosyn (Rash)    Intolerances        LABS:                        7.2    13.59 )-----------( 372      ( 31 Oct 2023 02:55 )             24.0     10-31    140  |  103  |  22  ----------------------------<  130<H>  4.3   |  28  |  1.10    Ca    8.5      31 Oct 2023 02:55  Phos  3.9     10-31  Mg     2.10     10-31    TPro  5.7<L>  /  Alb  1.8<L>  /  TBili  <0.2  /  DBili  x   /  AST  17  /  ALT  7   /  AlkPhos  207<H>  10-31    PTT - ( 30 Oct 2023 03:30 )  PTT:34.7 sec  Urinalysis Basic - ( 31 Oct 2023 02:55 )    Color: x / Appearance: x / SG: x / pH: x  Gluc: 130 mg/dL / Ketone: x  / Bili: x / Urobili: x   Blood: x / Protein: x / Nitrite: x   Leuk Esterase: x / RBC: x / WBC x   Sq Epi: x / Non Sq Epi: x / Bacteria: x        RADIOLOGY & ADDITIONAL TESTS: Reviewed. MICU Progress Note    INTERVAL HPI/OVERNIGHT EVENTS: Completed pressor-assisted HD yesterday up to 0.15 Levo gtt, unable to complete UF goal of 2.5L due to tachycardia w/ HR >130, only able to remove 1.7L.     SUBJECTIVE: Patient seen and examined at bedside. Patient's daughter at bedside concerned about white reside and bleeding from L ear.     ROS: unable to assess    OBJECTIVE:    VITAL SIGNS:  ICU Vital Signs Last 24 Hrs  T(C): 37.2 (31 Oct 2023 04:00), Max: 37.4 (30 Oct 2023 20:00)  T(F): 98.9 (31 Oct 2023 04:00), Max: 99.3 (30 Oct 2023 20:00)  HR: 118 (31 Oct 2023 06:00) (101 - 132)  BP: 67/34 (30 Oct 2023 16:00) (41/23 - 94/35)  BP(mean): 44 (30 Oct 2023 16:00) (30 - 62)  ABP: 131/50 (31 Oct 2023 06:00) (66/42 - 180/68)  ABP(mean): 84 (31 Oct 2023 06:00) (48 - 116)  RR: 21 (31 Oct 2023 06:00) (11 - 27)  SpO2: 100% (31 Oct 2023 06:00) (91% - 100%)    O2 Parameters below as of 31 Oct 2023 06:00  Patient On (Oxygen Delivery Method): ventilator    O2 Concentration (%): 60      Mode: AC/ CMV (Assist Control/ Continuous Mandatory Ventilation), RR (machine): 24, FiO2: 60, PEEP: 8, ITime: 0.8, MAP: 20, PC: 35, PIP: 43    10-30 @ 07:01  -  10-31 @ 07:00  --------------------------------------------------------  IN: 1434.5 mL / OUT: 2000 mL / NET: -565.5 mL      CAPILLARY BLOOD GLUCOSE      POCT Blood Glucose.: 132 mg/dL (31 Oct 2023 05:12)      PHYSICAL EXAM:  General: NAD  HEENT: NC/AT; PERRL, clear conjunctiva; L ear w/ grainy white residue, no bleeding appreciated  Neck: supple  Respiratory: CTA b/l  Cardiovascular: +S1/S2; RRR  Abdomen: soft, distended, warm to touch w/ area of erythema and induration RLQ  Extremities: WWP, 2+ peripheral pulses b/l; diffuse anasarca  Skin: normal color and turgor; no rash  Neurological: AO*0    MEDICATIONS:  MEDICATIONS  (STANDING):  albuterol    0.083% 2.5 milliGRAM(s) Nebulizer every 6 hours  apixaban 5 milliGRAM(s) Oral every 12 hours  artificial tears (preservative free) Ophthalmic Solution 1 Drop(s) Both EYES every 4 hours  atorvastatin 10 milliGRAM(s) Oral at bedtime  chlorhexidine 0.12% Liquid 15 milliLiter(s) Oral Mucosa every 12 hours  chlorhexidine 2% Cloths 1 Application(s) Topical daily  dextrose 5%. 1000 milliLiter(s) (100 mL/Hr) IV Continuous <Continuous>  dextrose 5%. 1000 milliLiter(s) (50 mL/Hr) IV Continuous <Continuous>  dextrose 50% Injectable 12.5 Gram(s) IV Push once  dextrose 50% Injectable 25 Gram(s) IV Push once  dextrose 50% Injectable 25 Gram(s) IV Push once  dextrose 50% Injectable 25 Gram(s) IV Push once  droxidopa 600 milliGRAM(s) Oral every 8 hours  epoetin odalis (EPOGEN) Injectable 51934 Unit(s) IV Push <User Schedule>  ferrous    sulfate Liquid 300 milliGRAM(s) Oral daily  glucagon  Injectable 1 milliGRAM(s) IntraMuscular once  insulin lispro (ADMELOG) corrective regimen sliding scale   SubCutaneous every 6 hours  insulin NPH human recombinant 6 Unit(s) SubCutaneous every 6 hours  levETIRAcetam  Solution 1000 milliGRAM(s) Oral daily  levETIRAcetam  Solution 250 milliGRAM(s) Oral <User Schedule>  midodrine. 40 milliGRAM(s) Oral <User Schedule>  norepinephrine Infusion 0.05 MICROgram(s)/kG/Min (6.23 mL/Hr) IV Continuous <Continuous>  pantoprazole  Injectable 40 milliGRAM(s) IV Push two times a day  petrolatum Ophthalmic Ointment 1 Application(s) Both EYES four times a day  sodium chloride 1 Gram(s) Oral two times a day    MEDICATIONS  (PRN):  acetaminophen   Oral Liquid .. 650 milliGRAM(s) Oral every 6 hours PRN Temp greater or equal to 38C (100.4F), Mild Pain (1 - 3)  dextrose Oral Gel 15 Gram(s) Oral once PRN Blood Glucose LESS THAN 70 milliGRAM(s)/deciliter  diphenhydrAMINE Elixir 50 milliGRAM(s) Oral once PRN Rash and/or Itching  droxidopa 200 milliGRAM(s) Oral <User Schedule> PRN Prior to hemodialysis  sodium chloride 0.9% Bolus. 250 milliLiter(s) IV Bolus every 5 minutes PRN SBP LESS THAN or EQUAL to 90 mmHg  sodium chloride 0.9% lock flush 10 milliLiter(s) IV Push every 1 hour PRN Pre/post blood products, medications, blood draw, and to maintain line patency      ALLERGIES:  Allergies    isoniazid (Rash)  nafcillin (Unknown)  hydrALAZINE (Rash)  vitamin E (Short breath; Urticaria; Hives)  doxycycline (Rash)  cefepime (Rash)  NIFEdipine (Urticaria; Hives)  Zosyn (Rash)    Intolerances        LABS:                        7.2    13.59 )-----------( 372      ( 31 Oct 2023 02:55 )             24.0     10-31    140  |  103  |  22  ----------------------------<  130<H>  4.3   |  28  |  1.10    Ca    8.5      31 Oct 2023 02:55  Phos  3.9     10-31  Mg     2.10     10-31    TPro  5.7<L>  /  Alb  1.8<L>  /  TBili  <0.2  /  DBili  x   /  AST  17  /  ALT  7   /  AlkPhos  207<H>  10-31    PTT - ( 30 Oct 2023 03:30 )  PTT:34.7 sec  Urinalysis Basic - ( 31 Oct 2023 02:55 )    Color: x / Appearance: x / SG: x / pH: x  Gluc: 130 mg/dL / Ketone: x  / Bili: x / Urobili: x   Blood: x / Protein: x / Nitrite: x   Leuk Esterase: x / RBC: x / WBC x   Sq Epi: x / Non Sq Epi: x / Bacteria: x        RADIOLOGY & ADDITIONAL TESTS: Reviewed.

## 2023-10-31 NOTE — PROGRESS NOTE ADULT - ASSESSMENT
74 y/o F well known to me from my [facility] outpt practice. she was admitted at [facility] 7/12-7/22 w aspiration PNA, was treated w CEFEPIME, developed an allergic rash,  dCHF, + MAC on AFB culture, had been progressively getting more and more lethargic and dyspneic at home since DC.   In  am of 8/11/23  ptn presented with respiratory distress w hypoxia and hypercarbia requiring intubation 2/2 volume overload +/- Asp PNA      Neuro   not responsive  Baseline MS AOx3, aphasic   - h/o CVA , on aspirin and statin . resumed w feeding tube, ASA resumed 8/26  - eeg  2/2 tremors, no sz focus, right facial twitching, on KEPPRA  - less responsive in the past few days  - MRI 8/17:  new R cerebellar infarct, old left PCA/Occipital infarct. probably embolic in nature, did not tolerate full AC in the past, STEPHANIE is neg , no shunts observed  - ptn is poorly reactive, rpt scan done, no further CVA seen.     Cardiac   cardiology following  CHFpEF   TTE 7/2023 with EF 59%, with severe LVH and diastolic dysfunction   on Levo drip 2/2 hypotension   on midodrine 40 mg qid, droxidopa 600 tid, additional 200 mg prior to HD,     Pulmonary   Acute hypercapnic and hypoxemic respiratory failure   prolonged intubation, now  trache to  vent, has copious secretions      Renal   on HD via L IJ Shiley, tunneled catheter when clinically stable  HD MWF, midodrine assisted, PUF in between HD days, unable to remove proper volume 2/2 hypotension.   permacath when clinically stable      GI  NPO  on tube feeds  coffee ground emesis x 1 8/24, melena overnight 8/31, s/p 1UPRBC, EGD/Colonoscopy: erosive gastropathy, esophagisits, on colonoscopy old blood seen no active bleeding, poor prep  family to decide re PEG placement    Endocrine  on Insulin: NPH, Admelog    ID   complicated infectious hospital course  treated for MSSA bacteremia, aspiration PNA.  sputum + for pseudomonas, ptn was initially on zosyn but was allergic, then was switched to aztreonam   Aztreonam was switched to polymyxin for a possible allergy but ptn didnt have a reaction to aztreonam, she had a reaction to Zosyn.   spike temps again while off Abx, Aztreonam resumed 10/17, DCed 10/29      ID course complicated with multiple ABx allergies  also treated for Left O. 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 and she remains full code at present

## 2023-10-31 NOTE — PROGRESS NOTE ADULT - SUBJECTIVE AND OBJECTIVE BOX
CC: left ear drainage     HPI: 73 year old female with a hx HTN, DM, CVA, admitted for respiratory distress, prolonged intubation on vent. Patient was previously evaluated by ENT for left OE. Had a wick placed and was treated with IV abx and drops. ENT signed off several weeks ago after improvement. Now having left ear drainage and excoriation to left Auricle. Patient is nonverbal. appears to be in discomfort from left ear.       PAST MEDICAL & SURGICAL HISTORY:  Breast CA      Diabetes      Stroke      Cardiac arrest      HTN (hypertension)      H/O mastectomy, bilateral        Allergies    isoniazid (Rash)  nafcillin (Unknown)  hydrALAZINE (Rash)  vitamin E (Short breath; Urticaria; Hives)  doxycycline (Rash)  cefepime (Rash)  NIFEdipine (Urticaria; Hives)  Zosyn (Rash)    Intolerances      MEDICATIONS  (STANDING):  albuterol    0.083% 2.5 milliGRAM(s) Nebulizer every 6 hours  apixaban 5 milliGRAM(s) Oral every 12 hours  artificial tears (preservative free) Ophthalmic Solution 1 Drop(s) Both EYES every 4 hours  atorvastatin 10 milliGRAM(s) Oral at bedtime  chlorhexidine 0.12% Liquid 15 milliLiter(s) Oral Mucosa every 12 hours  chlorhexidine 2% Cloths 1 Application(s) Topical daily  dextrose 5%. 1000 milliLiter(s) (50 mL/Hr) IV Continuous <Continuous>  dextrose 5%. 1000 milliLiter(s) (100 mL/Hr) IV Continuous <Continuous>  dextrose 50% Injectable 12.5 Gram(s) IV Push once  dextrose 50% Injectable 25 Gram(s) IV Push once  dextrose 50% Injectable 25 Gram(s) IV Push once  dextrose 50% Injectable 25 Gram(s) IV Push once  droxidopa 600 milliGRAM(s) Oral every 8 hours  epoetin odalis (EPOGEN) Injectable 99680 Unit(s) IV Push <User Schedule>  ferrous    sulfate Liquid 300 milliGRAM(s) Oral daily  glucagon  Injectable 1 milliGRAM(s) IntraMuscular once  insulin lispro (ADMELOG) corrective regimen sliding scale   SubCutaneous every 6 hours  insulin NPH human recombinant 6 Unit(s) SubCutaneous every 6 hours  levETIRAcetam  Solution 1000 milliGRAM(s) Oral daily  levETIRAcetam  Solution 250 milliGRAM(s) Oral <User Schedule>  midodrine. 40 milliGRAM(s) Oral <User Schedule>  norepinephrine Infusion 0.05 MICROgram(s)/kG/Min (6.23 mL/Hr) IV Continuous <Continuous>  pantoprazole  Injectable 40 milliGRAM(s) IV Push two times a day  petrolatum Ophthalmic Ointment 1 Application(s) Both EYES four times a day  sodium chloride 1 Gram(s) Oral two times a day    MEDICATIONS  (PRN):  acetaminophen   Oral Liquid .. 650 milliGRAM(s) Oral every 6 hours PRN Temp greater or equal to 38C (100.4F), Mild Pain (1 - 3)  dextrose Oral Gel 15 Gram(s) Oral once PRN Blood Glucose LESS THAN 70 milliGRAM(s)/deciliter  diphenhydrAMINE Elixir 50 milliGRAM(s) Oral once PRN Rash and/or Itching  droxidopa 200 milliGRAM(s) Oral <User Schedule> PRN Prior to hemodialysis  sodium chloride 0.9% Bolus. 250 milliLiter(s) IV Bolus every 5 minutes PRN SBP LESS THAN or EQUAL to 90 mmHg  sodium chloride 0.9% lock flush 10 milliLiter(s) IV Push every 1 hour PRN Pre/post blood products, medications, blood draw, and to maintain line patency      ROS:   ENT: all negative except as noted in HPI   CV: denies palpitations  Pulm: denies SOB, cough, hemoptysis  GI: denies change in apetite, indigestion, n/v  : denies pertinent urinary symptoms, urgency  Neuro: denies numbness/tingling, loss of sensation  Psych: denies anxiety  MS: denies muscle weakness, instability  Heme: denies easy bruising or bleeding  Endo: denies heat/cold intolerance, excessive sweating  Vascular: denies LE edema    Vital Signs Last 24 Hrs  T(C): 37.1 (31 Oct 2023 12:00), Max: 37.4 (30 Oct 2023 20:00)  T(F): 98.7 (31 Oct 2023 12:00), Max: 99.4 (31 Oct 2023 08:00)  HR: 113 (31 Oct 2023 12:00) (101 - 132)  BP: 105/52 (31 Oct 2023 08:00) (41/23 - 105/52)  BP(mean): 69 (31 Oct 2023 08:00) (30 - 69)  RR: 24 (31 Oct 2023 12:00) (11 - 27)  SpO2: 98% (31 Oct 2023 12:00) (91% - 100%)    Parameters below as of 31 Oct 2023 12:00  Patient On (Oxygen Delivery Method): ventilator    O2 Concentration (%): 35                          7.2    13.59 )-----------( 372      ( 31 Oct 2023 02:55 )             24.0    10-31    140  |  103  |  22  ----------------------------<  130<H>  4.3   |  28  |  1.10    Ca    8.5      31 Oct 2023 02:55  Phos  3.9     10-31  Mg     2.10     10-31    TPro  5.7<L>  /  Alb  1.8<L>  /  TBili  <0.2  /  DBili  x   /  AST  17  /  ALT  7   /  AlkPhos  207<H>  10-31   PTT - ( 30 Oct 2023 03:30 )  PTT:34.7 sec    PHYSICAL EXAM:  Gen: NAD  Skin: No rashes, bruises, or lesions  Head: Normocephalic, Atraumatic  Face: no edema, erythema, or fluctuance. Parotid glands soft without mass  Eyes: no scleral injection  Ears: Right - ear canal clear, TM intact without effusion or erythema. No evidence of any fluid drainage. No mastoid tenderness, erythema, or ear bulging            Left - auricle with excoriation/serous fluid and blood. No active bleeding.  ear canal with serous drainage. TM perforated. No mastoid tenderness, erythema, or ear bulging  Nose: Nares bilaterally patent, no discharge  Mouth: No Stridor / Drooling / Trismus.  Mucosa moist, tongue/uvula midline, oropharynx clear  Neck: Flat, supple, no lymphadenopathy, trachea midline, no masses  Lymphatic: No lymphadenopathy  Resp: breathing easily, no stridor  CV: no peripheral edema/cyanosis  GI: nondistended   Peripheral vascular: no JVD or edema  Neuro: facial nerve intact, no facial droop    IMAGING/ADDITIONAL STUDIES:

## 2023-10-31 NOTE — EEG REPORT - THIS IS A
Continuous Video EEG
Routine EEG
Continuous Video EEG

## 2023-10-31 NOTE — PROGRESS NOTE ADULT - ATTENDING COMMENTS
Patient is a 72 yo F (wheelchair bound prior to admission) w/ HFpEF, T2DM, CKD5, latent TB (s/p tx,) hx breast ca (2018, s/p mastectomy and adjuvant CT/RT), and hx prior CVA s/p trach/PEG (decannulated prior to admission, ILR in place) who was initially admitted on 8/11/23 after p/w with acute hypoxemic and hypercapnic respiratory failure thought to be 2/2 Aspiration PNA vs ADHF/flash pulmonary edema in setting of HTN emergency. Patient required intubation and mechanical ventilationtubation, and was admitted to MICU for lengthy period of time.     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     Hospital course also marked by recurrent infections, including MSSA bacteremia (s/p Vanc due to possible cefepime vs. cefazolin drug-induced rash) and MSSA/ESBL EColi in BAL. Also found to hve L otitis externa (9/5) s/p ENT debridement c/b concern for superimposed candida infection (s/p Meropenem, Fluconazole). Most recently w/ Proteus/Pseudomonas in sputum now s/p Aztreonam. Also further found to have L popliteal DVT.    Patient transferred back to MICU for trial of HD, possibly requiring IV vasopressors. A line placed over the weekend revealing inaccurate noninvasive BP measurements    #Shock  #Encephalopathy  #Multiple Strokes  #UGIB  #L popliteal DVT  #L fungal otitis externa  #L endophthalmitis  #Chronic respiratory failure  #Oropharyngeal dysphagia  #RUSS on CKD  #Pleural effusions  - c/w PO vasopressors to maintain MAP > 65 now with accurate BP with A line. Required IV Levo yest for HD, now off again  - c/w mechanical ventilatory support, trach care per MICU team. Changed to XLT trach earlier given excessive dynamic airway collapse  - HD as per renal (HD catheter reinserted 10/24 for HD), will need further discussion if continues to require IV vasopressors to tolerate  - c/w tube feeds  - c/w Eliquis, hb stable. Will resume ASA  - f/u eosinophilia workup  - ENT reeval for R ear discoloration and concern for L ear drainage as per family    Mitchell Mcnamara MD  Pulmonary & Critical Care

## 2023-10-31 NOTE — PROGRESS NOTE ADULT - SUBJECTIVE AND OBJECTIVE BOX
Patient is a 73y old  Female who presents with a chief complaint of Respiratory distress (31 Oct 2023 17:48)      SUBJECTIVE / OVERNIGHT EVENTS: Left ear O. exteran recurrence, seen by ENT, on drops, may need wick again    MEDICATIONS  (STANDING):  albuterol    0.083% 2.5 milliGRAM(s) Nebulizer every 6 hours  apixaban 5 milliGRAM(s) Oral every 12 hours  artificial tears (preservative free) Ophthalmic Solution 1 Drop(s) Both EYES every 4 hours  atorvastatin 10 milliGRAM(s) Oral at bedtime  chlorhexidine 0.12% Liquid 15 milliLiter(s) Oral Mucosa every 12 hours  chlorhexidine 2% Cloths 1 Application(s) Topical daily  ciprofloxacin/hydrocortisone Suspension Otic 4 Drop(s) Left Ear two times a day  dextrose 5%. 1000 milliLiter(s) (50 mL/Hr) IV Continuous <Continuous>  dextrose 5%. 1000 milliLiter(s) (100 mL/Hr) IV Continuous <Continuous>  dextrose 50% Injectable 12.5 Gram(s) IV Push once  dextrose 50% Injectable 25 Gram(s) IV Push once  dextrose 50% Injectable 25 Gram(s) IV Push once  dextrose 50% Injectable 25 Gram(s) IV Push once  droxidopa 600 milliGRAM(s) Oral every 8 hours  epoetin odalis (EPOGEN) Injectable 29314 Unit(s) IV Push <User Schedule>  ferrous    sulfate Liquid 300 milliGRAM(s) Oral daily  glucagon  Injectable 1 milliGRAM(s) IntraMuscular once  insulin lispro (ADMELOG) corrective regimen sliding scale   SubCutaneous every 6 hours  insulin NPH human recombinant 6 Unit(s) SubCutaneous every 6 hours  levETIRAcetam  Solution 1000 milliGRAM(s) Oral daily  levETIRAcetam  Solution 250 milliGRAM(s) Oral <User Schedule>  midodrine. 40 milliGRAM(s) Oral <User Schedule>  norepinephrine Infusion 0.05 MICROgram(s)/kG/Min (6.23 mL/Hr) IV Continuous <Continuous>  pantoprazole  Injectable 40 milliGRAM(s) IV Push two times a day  petrolatum Ophthalmic Ointment 1 Application(s) Both EYES four times a day  sodium chloride 1 Gram(s) Oral two times a day    MEDICATIONS  (PRN):  acetaminophen   Oral Liquid .. 650 milliGRAM(s) Oral every 6 hours PRN Temp greater or equal to 38C (100.4F), Mild Pain (1 - 3)  dextrose Oral Gel 15 Gram(s) Oral once PRN Blood Glucose LESS THAN 70 milliGRAM(s)/deciliter  diphenhydrAMINE Elixir 50 milliGRAM(s) Oral once PRN Rash and/or Itching  droxidopa 200 milliGRAM(s) Oral <User Schedule> PRN Prior to hemodialysis  sodium chloride 0.9% Bolus. 250 milliLiter(s) IV Bolus every 5 minutes PRN SBP LESS THAN or EQUAL to 90 mmHg  sodium chloride 0.9% lock flush 10 milliLiter(s) IV Push every 1 hour PRN Pre/post blood products, medications, blood draw, and to maintain line patency      Vital Signs Last 24 Hrs  T(F): 99.4 (10-31-23 @ 16:00), Max: 99.4 (10-31-23 @ 08:00)  HR: 106 (10-31-23 @ 20:00) (101 - 119)  BP: 105/52 (10-31-23 @ 08:00) (105/52 - 105/52)  RR: 24 (10-31-23 @ 20:00) (11 - 27)  SpO2: 100% (10-31-23 @ 20:00) (97% - 100%)  Telemetry:   CAPILLARY BLOOD GLUCOSE      POCT Blood Glucose.: 158 mg/dL (31 Oct 2023 17:51)  POCT Blood Glucose.: 135 mg/dL (31 Oct 2023 11:32)  POCT Blood Glucose.: 132 mg/dL (31 Oct 2023 05:12)  POCT Blood Glucose.: 130 mg/dL (31 Oct 2023 00:05)    I&O's Summary    30 Oct 2023 07:01  -  31 Oct 2023 07:00  --------------------------------------------------------  IN: 1474.5 mL / OUT: 2000 mL / NET: -525.5 mL    31 Oct 2023 07:01  -  31 Oct 2023 20:49  --------------------------------------------------------  IN: 570 mL / OUT: 0 mL / NET: 570 mL        PHYSICAL EXAM:  GENERAL: NAD, well-developed  HEAD:  Atraumatic, Normocephalic  EYES: EOMI, PERRLA, conjunctiva and sclera clear  NECK: Supple, No JVD  CHEST/LUNG: Clear to auscultation bilaterally; No wheeze  HEART: Regular rate and rhythm; No murmurs, rubs, or gallops  ABDOMEN: Soft, Nontender, Nondistended; Bowel sounds present  EXTREMITIES:  2+ Peripheral Pulses, No clubbing, cyanosis, or edema  PSYCH: AAOx3  NEUROLOGY: non-focal  SKIN: No rashes or lesions    LABS:                        7.2    13.59 )-----------( 372      ( 31 Oct 2023 02:55 )             24.0     10-31    140  |  103  |  22  ----------------------------<  130<H>  4.3   |  28  |  1.10    Ca    8.5      31 Oct 2023 02:55  Phos  3.9     10-31  Mg     2.10     10-31    TPro  5.7<L>  /  Alb  1.8<L>  /  TBili  <0.2  /  DBili  x   /  AST  17  /  ALT  7   /  AlkPhos  207<H>  10-31    PTT - ( 30 Oct 2023 03:30 )  PTT:34.7 sec      Urinalysis Basic - ( 31 Oct 2023 02:55 )    Color: x / Appearance: x / SG: x / pH: x  Gluc: 130 mg/dL / Ketone: x  / Bili: x / Urobili: x   Blood: x / Protein: x / Nitrite: x   Leuk Esterase: x / RBC: x / WBC x   Sq Epi: x / Non Sq Epi: x / Bacteria: x        RADIOLOGY & ADDITIONAL TESTS:    Imaging Personally Reviewed:    Consultant(s) Notes Reviewed:      Care Discussed with Consultants/Other Providers:

## 2023-10-31 NOTE — PROGRESS NOTE ADULT - ASSESSMENT
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 While in MICU patient noted with worsening renal failure requiring HD initiation, CGE/ Melena s/p EGD 8/31 found with esophagitis and erosive gastropathy, new right cerebellar infarct and fevers second to ESBL COLI and MSSA VAP, MSSA bacteremia, left ear otitis externa and drug rxn to cephalosporins Course further complicated by prolonged vent time s/p tracheostomy 9/1 and transferred to RCU 9/3 completed by recurrent fevers with high PIP, respiratory acidosis and concern for volume overloaded.   CTH repeated 9/26 for concern for encephalopathy - has known now - chronic bilateral cerebellar infarcts, L PCA infarct. L parietal hypodensity seen on prior CT likely artifactual  Repeat CTH 10/3 - unchanged  EEG 10/5 with L posterocentral/temporal spikes/sharp waves - doubt true focal seizure upon further review of EEG     Impression:   R/o new focal seizure - on Keppra  Suspect underlying movement d/o - PD?  Metabolic encephalopathy related to shock, infection, doubt new stroke  Multiple embolic strokes s/p ILR without events  Dementia   MSSA bacteremia, s/p Daptomycin  Acute popliteal DVT on Eliquis   Anemia     Recommendations   [] care per MICU for persistent hypotension  [] has multiple areas of epileptogenic potential on EEG, no clear seizure correlate seen. On Keppra but no improvement in mental status- new R facial twitching, would get 24h EEG and if negative can d.c  [] consider MRI brain once stable but doubt will change mgmt  [] mgmt of hypotension per picristiany team, remains full code  [] Keppra 500mg BID with extra 250mg after HD on HD days  [] Abx per ID  [] C/w home Atorvastatin 10 mg qhs   [] continue to address GOC with family, poor prognosis    Katty Charles DO  Vascular Neurology  Office [phone]

## 2023-10-31 NOTE — PROGRESS NOTE ADULT - PROBLEM SELECTOR PLAN 1
Pt called stating she is having a lot of bowel problems  States she's having increased belching and gas and wants you to get in touch with Dr Diane Juarez to see what she should do  1. Ciprodex drops 4 drops bid into left ear canal. ENT will follow. Will make ENT attending aware of patient.

## 2023-10-31 NOTE — PROGRESS NOTE ADULT - ASSESSMENT
IMPRESSION: 73F w/ HTN, DM2, CVA, breast CA-bilateral mastectomy, recurrent aspiration pneumonia/respiratory failure, and CKD, 8/11/23 p/w acute hypercapnic respiratory failure; c/b RUSS    (1)Renal - RUSS on CKD4 ==>now newly ESRD. Last dialyzed Saturday, HD today     (2)Hyponatremia - improved     (3)CV- tenuous hemodynamics such with each passing week we have more difficulty performing HD/achieving UF, persistent issue. BP stable with Jacki, attempt HD without pressor support today     (4)ID- BCx from 10/14/23 NGTD, BC (10/17) NGTD on IV abx     (5)Pulm- trach/vent-dependent    (6)Anemia- hgb low, epo with HD     IMPRESSION: 73F w/ HTN, DM2, CVA, breast CA-bilateral mastectomy, recurrent aspiration pneumonia/respiratory failure, and CKD, 8/11/23 p/w acute hypercapnic respiratory failure; c/b RUSS    (1)Renal - RUSS on CKD4 ==>now newly ESRD. S/p HD yesterday     (2)Hyponatremia - improved     (3)CV- tenuous hemodynamics such with each passing week we have more difficulty performing HD/achieving UF, persistent issue. BP stable with Jacki, though needed pressors with HD yesterday    (4)ID- BCx from 10/14/23 NGTD, BC (10/17) NGTD on IV abx     (5)Pulm- trach/vent-dependent    (6)Anemia- hgb low, epo with HD    RECOMMEND:  (1)HD today 2.5 kg UF; pressors and sodium modelling as needed to keep SBP>90; Epogen with HD   (2)Dose new meds for GFR <10/HD.     Nilda Espinoza, HEAVEN  [facility]  [phone]      IMPRESSION: 73F w/ HTN, DM2, CVA, breast CA-bilateral mastectomy, recurrent aspiration pneumonia/respiratory failure, and CKD, 8/11/23 p/w acute hypercapnic respiratory failure; c/b RUSS    (1)Renal - RUSS on CKD4 ==>now newly ESRD. S/p HD yesterday     (2)Hyponatremia - improved     (3)CV- tenuous hemodynamics such with each passing week we have more difficulty performing HD/achieving UF, persistent issue. BP stable with Starlight, though needed pressors with HD yesterday    (4)ID- BCx from 10/14/23 NGTD, BC (10/17) NGTD on IV abx     (5)Pulm- trach/vent-dependent    (6)Anemia- hgb low, epo with HD    RECOMMEND:  (1)HD today 2.5 kg UF; pressors and sodium modelling as needed to keep SBP>90; Epogen with HD   (2)Dose new meds for GFR <10/HD.     Nilda Espinoza DNP  [facility]  [phone]       RENAL ATTENDING NOTE  Patient seen and examined with NP. Only achieved 1600cc UF, rather than 2500cc as ordered due to intradialytic hypotension and tachycardia. No need for HD today...she likely will not well-tolerate it given her tenuous hemodynamics. We can plan to reattempt HD tomorrow, with goal 1.5L UF.        Jimmy Colon MD  [facility]  [phone]

## 2023-10-31 NOTE — PROGRESS NOTE ADULT - SUBJECTIVE AND OBJECTIVE BOX
S:  Pt seen and examined.     Medications: MEDICATIONS  (STANDING):  albuterol    0.083% 2.5 milliGRAM(s) Nebulizer every 6 hours  apixaban 5 milliGRAM(s) Oral every 12 hours  artificial tears (preservative free) Ophthalmic Solution 1 Drop(s) Both EYES every 4 hours  atorvastatin 10 milliGRAM(s) Oral at bedtime  chlorhexidine 0.12% Liquid 15 milliLiter(s) Oral Mucosa every 12 hours  chlorhexidine 2% Cloths 1 Application(s) Topical daily  ciprofloxacin/hydrocortisone Suspension Otic 4 Drop(s) Left Ear two times a day  dextrose 5%. 1000 milliLiter(s) (50 mL/Hr) IV Continuous <Continuous>  dextrose 5%. 1000 milliLiter(s) (100 mL/Hr) IV Continuous <Continuous>  dextrose 50% Injectable 25 Gram(s) IV Push once  dextrose 50% Injectable 25 Gram(s) IV Push once  dextrose 50% Injectable 12.5 Gram(s) IV Push once  dextrose 50% Injectable 25 Gram(s) IV Push once  droxidopa 600 milliGRAM(s) Oral every 8 hours  epoetin odalis (EPOGEN) Injectable 83538 Unit(s) IV Push <User Schedule>  ferrous    sulfate Liquid 300 milliGRAM(s) Oral daily  glucagon  Injectable 1 milliGRAM(s) IntraMuscular once  insulin lispro (ADMELOG) corrective regimen sliding scale   SubCutaneous every 6 hours  insulin NPH human recombinant 6 Unit(s) SubCutaneous every 6 hours  levETIRAcetam  Solution 1000 milliGRAM(s) Oral daily  levETIRAcetam  Solution 250 milliGRAM(s) Oral <User Schedule>  midodrine. 40 milliGRAM(s) Oral <User Schedule>  norepinephrine Infusion 0.05 MICROgram(s)/kG/Min (6.23 mL/Hr) IV Continuous <Continuous>  pantoprazole  Injectable 40 milliGRAM(s) IV Push two times a day  petrolatum Ophthalmic Ointment 1 Application(s) Both EYES four times a day  sodium chloride 1 Gram(s) Oral two times a day    MEDICATIONS  (PRN):  acetaminophen   Oral Liquid .. 650 milliGRAM(s) Oral every 6 hours PRN Temp greater or equal to 38C (100.4F), Mild Pain (1 - 3)  dextrose Oral Gel 15 Gram(s) Oral once PRN Blood Glucose LESS THAN 70 milliGRAM(s)/deciliter  diphenhydrAMINE Elixir 50 milliGRAM(s) Oral once PRN Rash and/or Itching  droxidopa 200 milliGRAM(s) Oral <User Schedule> PRN Prior to hemodialysis  sodium chloride 0.9% Bolus. 250 milliLiter(s) IV Bolus every 5 minutes PRN SBP LESS THAN or EQUAL to 90 mmHg  sodium chloride 0.9% lock flush 10 milliLiter(s) IV Push every 1 hour PRN Pre/post blood products, medications, blood draw, and to maintain line patency       Vitals:  Vital Signs Last 24 Hrs  T(C): 37.1 (31 Oct 2023 12:00), Max: 37.4 (30 Oct 2023 20:00)  T(F): 98.7 (31 Oct 2023 12:00), Max: 99.4 (31 Oct 2023 08:00)  HR: 119 (31 Oct 2023 14:00) (101 - 132)  BP: 105/52 (31 Oct 2023 08:00) (67/34 - 105/52)  BP(mean): 69 (31 Oct 2023 08:00) (44 - 69)  RR: 21 (31 Oct 2023 14:00) (11 - 27)  SpO2: 99% (31 Oct 2023 14:00) (98% - 100%)    Parameters below as of 31 Oct 2023 14:00      O2 Concentration (%): 35          Neurological Exam:  Mental Status: Eyes closed, minimal spont eye opening off sedation. Does not follow commands,  + trach to vent and NGT   Cranial Nerves: PERRL slightly sluggish, L subconjunctival hemorrhage improved. R facial twitching noted by mouth - ? suppressible. occasional head dystonia  Motor: Moves upper extremities spontaneously R >L, tremor R > L  no movement noted in lowers  Sensation: WD to noxious x4    I personally reviewed the below data/images/labs:      LABS:                          7.2    13.59 )-----------( 372      ( 31 Oct 2023 02:55 )             24.0     10-31    140  |  103  |  22  ----------------------------<  130<H>  4.3   |  28  |  1.10    Ca    8.5      31 Oct 2023 02:55  Phos  3.9     10-31  Mg     2.10     10-31    TPro  5.7<L>  /  Alb  1.8<L>  /  TBili  <0.2  /  DBili  x   /  AST  17  /  ALT  7   /  AlkPhos  207<H>  10-31    LIVER FUNCTIONS - ( 31 Oct 2023 02:55 )  Alb: 1.8 g/dL / Pro: 5.7 g/dL / ALK PHOS: 207 U/L / ALT: 7 U/L / AST: 17 U/L / GGT: x           PTT - ( 30 Oct 2023 03:30 )  PTT:34.7 sec  Urinalysis Basic - ( 31 Oct 2023 02:55 )    Color: x / Appearance: x / SG: x / pH: x  Gluc: 130 mg/dL / Ketone: x  / Bili: x / Urobili: x   Blood: x / Protein: x / Nitrite: x   Leuk Esterase: x / RBC: x / WBC x   Sq Epi: x / Non Sq Epi: x / Bacteria: x            < from: CT Head No Cont (08.15.23 @ 17:20) >    ACC: [id number] EXAM:  CT BRAIN   ORDERED BY: MINAL SAM     PROCEDURE DATE:  08/15/2023          INTERPRETATION:  Clinical indication: Change in neuro exam.    Multiple axial sections were performed from base to vertex without   contrast enhancement. Coronal and sagittal reconstructions were   reformatted well.    This exam is compared prior head CT performed on August 11, 2023    Parenchymal volume loss and chronic microvessel ischemic changes are   again seen.    Abnormal low-attenuation involving the left occipital cortical   subcortical region is again seen. 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    No evidence of acute hemorrhage mass or mass effect is seen.    Evaluation of the osseous structures with appropriate window demonstrates   sclerotic changes about the left mastoid region which appears stable.   Opacification left middle ear region is again seen.    Patient is status post bilateral cataract surgery.    Impression: Stable exam.    < end of copied text >    vEEG:    Clinical Impression:  - Severe diffuse non-specific cerebral dysfunction  - There were no epileptiform abnormalities or seizures recorded.        CTH 8/11:    VENTRICLES AND SULCI: Age-appropriate involutional change  INTRA-AXIAL:  Old left PCA infarct as seen on the prior unchanged.   Microvascular ischemic changes involving the periventricular and   subcortical white matter as seen previously  EXTRA-AXIAL:  No mass or collection is seen.  VISUALIZED SINUSES:  Clear.  VISUALIZED MASTOIDS: Left mastoid sclerosis  CALVARIUM: Infiltrative appearance tothe calvarium may be indicative of   marrow infiltration on the basis of patient's known diagnosis of breast   cancer. MISCELLANEOUS:  None.    IMPRESSION:  No significant interval change compared with 7/17/2023 in   left PCA infarct which is old. Microvascular ischemic changes involving   the periventricular and subcortical white matter as seen   previously.Questionable lesions at the level of the calvarium related to   possible breast CA. Clinical correlation recommended.    --- End of Report ---      ct< from: CT Head No Cont (09.26.23 @ 21:02) >  IMPRESSION: Vague questionable areas of hypoattenuation within the   bilateral cerebellar hemispheres which may be compatible with   acute/subacute ischemia. Recommend further evaluation with a brain MRI   study, provided there are no MRI contraindications.    No acute intracranial hemorrhage.    Previously seen questionable vague wedge-shaped area of hypoattenuation   in the left parietal lobe with artifactual secondary to motion and volume   averaging with a prominent sulcus.    Chronic left occipital lobe infarct.    < end of copied text >    < from: CT Head No Cont (10.03.23 @ 20:46) >    IMPRESSION:    No acute intracranial hemorrhage or mass effect. Evaluation of the   posterior fossa degraded by streak artifact from dental amalgam.   Lucencies in the bilateral cerebellum may be artifactual.    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    Bilateral middle ear and mastoid effusions which are also seen on prior   exam.    EEG Classification / Summary:  Abnormal EEG in the awake, drowsy states.   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   -No seizures

## 2023-10-31 NOTE — CHART NOTE - NSCHARTNOTEFT_GEN_A_CORE
NUTRITION FOLLOW-UP      73 y.o. DM, HTN, CKD, CHFpEF, breast Ca, respiratory and cardiac arrest (2018), CVA w residual weakness s/p trach/PEG which has since been removed.  Presenting with respiratory distress requiring intubation.   Pt. with acute hypoxic respiratory failure with hypoxia and hypercapnia which is multifactorial including aspiration and acute on chronic diastolic CHF.  Also with tracheomalacia, s/p tracheostomy with course c/b bacteremia, L otitis externa (9/5) s/p ENT debridement x2 & RUSS/CKD now ESRD requiring HD.  However, Pt. not consistently tolerating HD & deemed not a candidate for CRRT.  Repeated MICU stays for hypotension and multiorgan failure. Continues to receive pressor supported HD.    Tube feeding w Glucerna 1.5 @ prescribed goal. Pt. without any noted/reported intolerance to TF @ this time (no nausea/vomiting/constipation or abdominal distention). Liquid BM (10/31).    Current enteral regimen remains appropriate. Would continue.    _________________Diet___________________  Diet, NPO with Tube Feed:   Tube Feeding Modality: Nasogastric  Glucerna 1.5 Judd (GLUCERNA1.5RTH)  Total Volume for 24 Hours (mL): 960  Continuous  Starting Tube Feed Rate {mL per Hour}: 10  Increase Tube Feed Rate by (mL): 10     Every 4 hours  Until Goal Tube Feed Rate (mL per Hour): 40  Tube Feed Duration (in Hours): 24  Tube Feed Start Time: 17:30  No Carb Prosource (1pkg = 15gms Protein)     Qty per Day:  1 (10-20-23 @ 18:51) [Active]      provides:  960mL total volume  1440 kcals  79g protein (+15 g additional protein from NoCarb Prosource)= 94g total protein   729mL free water       Weight:                    Height:   61"                Upper 10% Ideal Body Weight:  115.5lbs / 52.5kg   80.4kg (10/31)  75.8kg (10/24)  78.2kg (10/19)  78.5kg (10/2)  83.2kg (9/24)  67.8kg (9/10)  58.0kg (8/31)  66.5kg (8/11 on admit)      _____Estimated Energy Needs (based upper 10% Ideal Body Weight)_____  25-30 kcals/kg = 8201-3572 kcals/d  1.5-1.8.g protein/kg = 80-95g protein/d        Edema: 2+ generalized  Skin: No pressure injuries        ________________________Pertinent Medications____________   MEDICATIONS  (STANDING):  albuterol    0.083% 2.5 milliGRAM(s) Nebulizer every 6 hours  apixaban 5 milliGRAM(s) Oral every 12 hours  artificial tears (preservative free) Ophthalmic Solution 1 Drop(s) Both EYES every 4 hours  atorvastatin 10 milliGRAM(s) Oral at bedtime  chlorhexidine 0.12% Liquid 15 milliLiter(s) Oral Mucosa every 12 hours  chlorhexidine 2% Cloths 1 Application(s) Topical daily  ciprofloxacin/hydrocortisone Suspension Otic 4 Drop(s) Left Ear two times a day  dextrose 5%. 1000 milliLiter(s) (100 mL/Hr) IV Continuous <Continuous>  dextrose 5%. 1000 milliLiter(s) (50 mL/Hr) IV Continuous <Continuous>  dextrose 50% Injectable 12.5 Gram(s) IV Push once  dextrose 50% Injectable 25 Gram(s) IV Push once  dextrose 50% Injectable 25 Gram(s) IV Push once  dextrose 50% Injectable 25 Gram(s) IV Push once  droxidopa 600 milliGRAM(s) Oral every 8 hours  epoetin odalis (EPOGEN) Injectable 09239 Unit(s) IV Push <User Schedule>  ferrous    sulfate Liquid 300 milliGRAM(s) Oral daily  glucagon  Injectable 1 milliGRAM(s) IntraMuscular once  insulin lispro (ADMELOG) corrective regimen sliding scale   SubCutaneous every 6 hours  insulin NPH human recombinant 6 Unit(s) SubCutaneous every 6 hours  levETIRAcetam  Solution 1000 milliGRAM(s) Oral daily  levETIRAcetam  Solution 250 milliGRAM(s) Oral <User Schedule>  midodrine. 40 milliGRAM(s) Oral <User Schedule>  norepinephrine Infusion 0.05 MICROgram(s)/kG/Min (6.23 mL/Hr) IV Continuous <Continuous>  pantoprazole  Injectable 40 milliGRAM(s) IV Push two times a day  petrolatum Ophthalmic Ointment 1 Application(s) Both EYES four times a day  sodium chloride 1 Gram(s) Oral two times a day    MEDICATIONS  (PRN):  acetaminophen   Oral Liquid .. 650 milliGRAM(s) Oral every 6 hours PRN Temp greater or equal to 38C (100.4F), Mild Pain (1 - 3)  dextrose Oral Gel 15 Gram(s) Oral once PRN Blood Glucose LESS THAN 70 milliGRAM(s)/deciliter  diphenhydrAMINE Elixir 50 milliGRAM(s) Oral once PRN Rash and/or Itching  droxidopa 200 milliGRAM(s) Oral <User Schedule> PRN Prior to hemodialysis  sodium chloride 0.9% Bolus. 250 milliLiter(s) IV Bolus every 5 minutes PRN SBP LESS THAN or EQUAL to 90 mmHg  sodium chloride 0.9% lock flush 10 milliLiter(s) IV Push every 1 hour PRN Pre/post blood products, medications, blood draw, and to maintain line patency          _________________________Pertinent Labs____________________     10-31    140  |  103  |  22  ----------------------------<  130<H>  4.3   |  28  |  1.10    Ca    8.5      31 Oct 2023 02:55  Phos  3.9     10-31  Mg     2.10     10-31    TPro  5.7<L>  /  Alb  1.8<L>  /  TBili  <0.2  /  DBili  x   /  AST  17  /  ALT  7   /  AlkPhos  207<H>  10-31                                                                   7.2    13.59 )-----------( 372      ( 31 Oct 2023 02:55 )             24.0         CAPILLARY BLOOD GLUCOSE      POCT Blood Glucose.: 135 mg/dL (31 Oct 2023 11:32)    POCT Blood Glucose.: 135 mg/dL (10-31-23 @ 11:32)  POCT Blood Glucose.: 132 mg/dL (10-31-23 @ 05:12)  POCT Blood Glucose.: 130 mg/dL (10-31-23 @ 00:05)  POCT Blood Glucose.: 141 mg/dL (10-30-23 @ 18:04)      ________NUTRITION Dx_________    Pt. remains severely malnourished         PLAN/RECOMMENDATIONS:    1) Continue current enteral regimen, as tolerated.  2) Obtain daily & pre/post HD weights  3) Monitor tolerance to TF, GI status, electrolytes, glucose     RDN remains available and will f/u PRN.          Jaylene Ybarra RDN, CDN       pager [phone] or [employer] Teams.          Jaylene Ybarra RDN, CY       pager [phone] or MS Teams

## 2023-10-31 NOTE — PROGRESS NOTE ADULT - SUBJECTIVE AND OBJECTIVE BOX
Subjective: Patient seen and examined. No new events except as noted.   remains in ICU   Completed pressor-assisted HD yesterday up to 0.15 Levo gtt, unable to complete UF goal of 2.5L due to tachycardia w/ HR >130, only able to remove 1.7L.   REVIEW OF SYSTEMS:  Unable to obtain     MEDICATIONS:  MEDICATIONS  (STANDING):  albuterol    0.083% 2.5 milliGRAM(s) Nebulizer every 6 hours  apixaban 5 milliGRAM(s) Oral every 12 hours  artificial tears (preservative free) Ophthalmic Solution 1 Drop(s) Both EYES every 4 hours  atorvastatin 10 milliGRAM(s) Oral at bedtime  chlorhexidine 0.12% Liquid 15 milliLiter(s) Oral Mucosa every 12 hours  chlorhexidine 2% Cloths 1 Application(s) Topical daily  ciprofloxacin/hydrocortisone Suspension Otic 4 Drop(s) Left Ear two times a day  dextrose 5%. 1000 milliLiter(s) (50 mL/Hr) IV Continuous <Continuous>  dextrose 5%. 1000 milliLiter(s) (100 mL/Hr) IV Continuous <Continuous>  dextrose 50% Injectable 12.5 Gram(s) IV Push once  dextrose 50% Injectable 25 Gram(s) IV Push once  dextrose 50% Injectable 25 Gram(s) IV Push once  dextrose 50% Injectable 25 Gram(s) IV Push once  droxidopa 600 milliGRAM(s) Oral every 8 hours  epoetin odalis (EPOGEN) Injectable 29004 Unit(s) IV Push <User Schedule>  ferrous    sulfate Liquid 300 milliGRAM(s) Oral daily  glucagon  Injectable 1 milliGRAM(s) IntraMuscular once  insulin lispro (ADMELOG) corrective regimen sliding scale   SubCutaneous every 6 hours  insulin NPH human recombinant 6 Unit(s) SubCutaneous every 6 hours  levETIRAcetam  Solution 1000 milliGRAM(s) Oral daily  levETIRAcetam  Solution 250 milliGRAM(s) Oral <User Schedule>  midodrine. 40 milliGRAM(s) Oral <User Schedule>  norepinephrine Infusion 0.05 MICROgram(s)/kG/Min (6.23 mL/Hr) IV Continuous <Continuous>  pantoprazole  Injectable 40 milliGRAM(s) IV Push two times a day  petrolatum Ophthalmic Ointment 1 Application(s) Both EYES four times a day  sodium chloride 1 Gram(s) Oral two times a day      PHYSICAL EXAM:  T(C): 37.4 (10-31-23 @ 16:00), Max: 37.4 (10-30-23 @ 20:00)  HR: 114 (10-31-23 @ 16:12) (101 - 128)  BP: 105/52 (10-31-23 @ 08:00) (105/52 - 105/52)  RR: 24 (10-31-23 @ 16:00) (11 - 27)  SpO2: 100% (10-31-23 @ 16:12) (97% - 100%)  Wt(kg): --  I&O's Summary    30 Oct 2023 07:01  -  31 Oct 2023 07:00  --------------------------------------------------------  IN: 1474.5 mL / OUT: 2000 mL / NET: -525.5 mL    31 Oct 2023 07:01  -  31 Oct 2023 17:48  --------------------------------------------------------  IN: 460 mL / OUT: 0 mL / NET: 460 mL          Appearance: NAD, +trach  HEENT: dry oral mucosa  Lymphatic: No lymphadenopathy  Cardiovascular: Normal S1 S2, No JVD, No murmurs, No edema  Respiratory: Decreased BS, + trach to vent	  Neuro: opens eyes  Gastrointestinal: Soft, Non-tender, + BS, + NGT  Skin: No rashes, No ecchymoses, No cyanosis	  Extremities: No strength/ROM 2/2 sedation, + BL LE edema  Vascular: Peripheral pulses palpable 2+ bilaterally  Anasarca   Mode: AC/ CMV (Assist Control/ Continuous Mandatory Ventilation), RR (machine): 24, FiO2: 35, PEEP: 8, ITime: 0.8, MAP: 19, PC: 35, PIP: 43        LABS:    CARDIAC MARKERS:        Blood Gas Profile - Arterial (10.31.23 @ 02:55)   pH, Arterial: 7.44  pCO2, Arterial: 40 mmHg  pO2, Arterial: 174 mmHg  HCO3, Arterial: 27 mmol/L  Base Excess, Arterial: 2.8 mmol/L  Oxygen Saturation, Arterial: 98.8 %  Total CO2, Arterial: 28 mmol/L  Blood Gas Source Arterial: Arterial                        7.2    13.59 )-----------( 372      ( 31 Oct 2023 02:55 )             24.0     10-31    140  |  103  |  22  ----------------------------<  130<H>  4.3   |  28  |  1.10    Ca    8.5      31 Oct 2023 02:55  Phos  3.9     10-31  Mg     2.10     10-31    TPro  5.7<L>  /  Alb  1.8<L>  /  TBili  <0.2  /  DBili  x   /  AST  17  /  ALT  7   /  AlkPhos  207<H>  10-31          TELEMETRY: 	 SR ST    ECG:  	  RADIOLOGY:   DIAGNOSTIC TESTING:  [ ] Echocardiogram:  [ ]  Catheterization:  [ ] Stress Test:    OTHER:

## 2023-10-31 NOTE — PROGRESS NOTE ADULT - ASSESSMENT
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       NEUROLOGY  #AMS  Multiple etiologies including active infection vs CVA.   MR head performed w/ new infarct and old infarcts, EEG without seizure events but with high foci potential   - f/u repeat MRI --> will f/u with neuro about timing of this  - continue lipitor  - continue keppra given above  - aspirin held 10/11, likely in s/o GIB, Hgb stable not requiring pRBC since 10/14      CARDIOVASCULAR  # Septic vs vasoplegic shock   Etiology likely septic iso active infection. Possible component of vasoplegic, however no longer with active infection or on sedation. Off IV vasopressors.   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   - resolved    RESPIRATORY  # AHRF second to volume overload   - CKD vs HFpEF w/ hypercarbia 2/2 volume overload requiring intubation, trach, and HD initiation  - Continue on albuterol and chest PT  - Continue volume removal with HD      #tracheomalacia   - CT CHEST (9/8) with tracheomalacia, BL pleural effusions and continued consolidations     #mechanical ventilation   - PC 24/43/8/35     GI  # UGIB: last pRBC transfused 10/14  - Continue on PPI BID    # Dysphagia   - NGT-TF     RENAL  # ESRD  - HD MWF TIW with midodrine and droxidopa  - ICU for vasopressor assisted volume removal, cap to 1 pressor and not offering CRRT due to comorbidities and prognosis   - will titrate droxidopa to maintain off pressors during dialysis     INFECTIOUS DISEASE  # 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 Coli sputum Cx, BAL w/ MSSA  - treated with abx   - eosinophilia workup     # Proteus and  PSA VAP/ Trachitis   - Overall difficulty with ABX tailoring given allergic rxns and resistant organisms  - dc aztreonam    # MAC   - outpatient treatment    HEME  # Anemia second to GIB vs renal disease   - received transfusions during stay  - management as per renal, PPI    VASCULAR   # LLE POP DVT   - on Eliquis    # HD access  - TRISHA TAYLOR (9/27 - 10/21)  - TRISHA taylor replaced for HD     ONC   # Hx of Breast CA   - Patient dx in 2018 and s/p BL mastectomy (radical on right) and chemo and RT.     ENDOCRINE  # IDDM2   - Continued on NPH with ISS, however noted with hypoglycemic episodes and NPH dc'ed.    - Finger sticks trending up off NPH.   - Continue on NPH 6U with moderate ISS.   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       NEUROLOGY  #AMS  Multiple etiologies including active infection vs CVA.   MR head performed w/ new infarct and old infarcts, EEG without seizure events but with high foci potential   - f/u spot EEG given facial twitching  - f/u repeat MRI --> per neuro, can pursue MRI after EEG to aid in neuroprognostication as patient is not waking up  - continue lipitor  - continue keppra given above  - aspirin held 10/11, likely in s/o GIB, Hgb stable not requiring pRBC since 10/14      CARDIOVASCULAR  # Septic vs vasoplegic shock   Etiology likely septic iso active infection. Possible component of vasoplegic, however no longer with active infection or on sedation. Off IV vasopressors.   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   - resolved    RESPIRATORY  # AHRF second to volume overload   - CKD vs HFpEF w/ hypercarbia 2/2 volume overload requiring intubation, trach, and HD initiation  - Continue on albuterol and chest PT  - Continue volume removal with HD      #tracheomalacia   - CT CHEST (9/8) with tracheomalacia, BL pleural effusions and continued consolidations     #mechanical ventilation   - PC 24/43/8/35     GI  # UGIB: last pRBC transfused 10/14  - Continue on PPI BID    # Dysphagia   - NGT-TF     RENAL  # ESRD  - HD MWF TIW with midodrine and droxidopa  - ICU for vasopressor assisted volume removal, cap to 1 pressor and not offering CRRT due to comorbidities and prognosis   - f/u w/ nephrology on 10/31: will continue to offer patient 1-pressor assisted HD as agreed prior, as long as she can tolerate HD maxed on 1 pressor, she will be considered iHD candidate    INFECTIOUS DISEASE  # 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 Coli sputum Cx, BAL w/ MSSA  - treated with abx   - eosinophilia workup     # Proteus and  PSA VAP/ Trachitis   - Overall difficulty with ABX tailoring given allergic rxns and resistant organisms  - dc aztreonam    # MAC   - outpatient treatment    HEME  # Anemia second to GIB vs renal disease   - received transfusions during stay  - management as per renal, PPI    VASCULAR   # LLE POP DVT   - on Eliquis    # HD access  - TRISHA TAYL[facility] (9/27 - 10/21)  - TRISHA taylor replaced for HD     ONC   # Hx of Breast CA   - Patient dx in 2018 and s/p BL mastectomy (radical on right) and chemo and RT.     ENDOCRINE  # IDDM2   - Continued on NPH with ISS, however noted with hypoglycemic episodes and NPH dc'ed.    - Finger sticks trending up off NPH.   - Continue on NPH 6U with moderate ISS.   - Adjust as need     SKIN  # Drug Eruption   - treated    ETHICS/ GOC    - Attempted palliative discussion however family not interested and wishes for FULL CODE  - Family continues to want everything done despite HD failure on multiple oral pressor

## 2023-10-31 NOTE — EEG REPORT - NS EEG TEXT BOX
MILANA BALL [id number]     Study Date: 		10-31-23  Duration in hours:  3h    --------------------------------------------------------------------------------------------------  History:  CC/ HPI Patient is a 73y old  Female who presents with a chief complaint of Respiratory distress (31 Oct 2023 17:48)    MEDICATIONS  (STANDING):  albuterol    0.083% 2.5 milliGRAM(s) Nebulizer every 6 hours  apixaban 5 milliGRAM(s) Oral every 12 hours  artificial tears (preservative free) Ophthalmic Solution 1 Drop(s) Both EYES every 4 hours  atorvastatin 10 milliGRAM(s) Oral at bedtime  chlorhexidine 0.12% Liquid 15 milliLiter(s) Oral Mucosa every 12 hours  chlorhexidine 2% Cloths 1 Application(s) Topical daily  ciprofloxacin/hydrocortisone Suspension Otic 4 Drop(s) Left Ear two times a day  dextrose 5%. 1000 milliLiter(s) (50 mL/Hr) IV Continuous <Continuous>  dextrose 5%. 1000 milliLiter(s) (100 mL/Hr) IV Continuous <Continuous>  dextrose 50% Injectable 12.5 Gram(s) IV Push once  dextrose 50% Injectable 25 Gram(s) IV Push once  dextrose 50% Injectable 25 Gram(s) IV Push once  dextrose 50% Injectable 25 Gram(s) IV Push once  droxidopa 600 milliGRAM(s) Oral every 8 hours  epoetin odalis (EPOGEN) Injectable 84210 Unit(s) IV Push <User Schedule>  ferrous    sulfate Liquid 300 milliGRAM(s) Oral daily  glucagon  Injectable 1 milliGRAM(s) IntraMuscular once  insulin lispro (ADMELOG) corrective regimen sliding scale   SubCutaneous every 6 hours  insulin NPH human recombinant 6 Unit(s) SubCutaneous every 6 hours  levETIRAcetam  Solution 1000 milliGRAM(s) Oral daily  levETIRAcetam  Solution 250 milliGRAM(s) Oral <User Schedule>  midodrine. 40 milliGRAM(s) Oral <User Schedule>  norepinephrine Infusion 0.05 MICROgram(s)/kG/Min (6.23 mL/Hr) IV Continuous <Continuous>  pantoprazole  Injectable 40 milliGRAM(s) IV Push two times a day  petrolatum Ophthalmic Ointment 1 Application(s) Both EYES four times a day  sodium chloride 1 Gram(s) Oral two times a day    --------------------------------------------------------------------------------------------------  Study Interpretation:    [Abbreviation Key:  PDR=alpha rhythm/posterior dominant rhythm. A-P=anterior posterior.  Amplitude: ‘very low’:<20; ‘low’:20-49; ‘medium’:; ‘high’:>150uV.  Persistence for periodic/rhythmic patterns (% of epoch) ‘rare’:<1%; ‘occasional’:1-10%; ‘frequent’:10-50%; ‘abundant’:50-90%; ‘continuous’:>90%.  Persistence for sporadic discharges: ‘rare’:<1/hr; ‘occasional’:1/min-1/hr; ‘frequent’:>1/min; ‘abundant’:>1/10 sec.  RPP=rhythmic and periodic patterns; GRDA=generalized rhythmic delta activity; FIRDA=frontal intermittent GRDA; LRDA=lateralized rhythmic delta activity; TIRDA=temporal intermittent rhythmic delta activity;  LPD=PLED=lateralized periodic discharges; GPD=generalized periodic discharges; BIPDs =bilateral independent periodic discharges; Mf=multifocal; SIRPDs=stimulus induced rhythmic, periodic, or ictal appearing discharges; BIRDs=brief potentially ictal rhythmic discharges >4 Hz, lasting .5-10s; PFA (paroxysmal bursts >13 Hz or =8 Hz <10s).  Modifiers: +F=with fast component; +S=with spike component; +R=with rhythmic component.  S-B=burst suppression pattern.  Max=maximal. N1-drowsy; N2-stage II sleep; N3-slow wave sleep. SSS/BETS=small sharp spikes/benign epileptiform transients of sleep. HV=hyperventilation; PS=photic stimulation]    FINDINGS:      Background:  Continuity: continuous  Symmetry: symmetric  PDR: none   Reactivity: present  Voltage: normal (between 20-150uV)  Anterior Posterior Gradient: present  Other background findings: none  Breach: absent    Background Slowing:  Generalized slowing: diffuse irregular delta and theta activity.  Focal slowing: none was present.    State Changes:   -Drowsiness noted with increased slowing, attenuation of fast activity  -N2 sleep transients were not recorded.    Sporadic Epileptiform Discharges:    None    Rhythmic and Periodic Patterns (RPPs):  None     Electrographic and Electroclinical seizures:  None    Other Clinical Events:  None    Activation Procedures:   -Hyperventilation was not performed.    -Photic stimulation was not performed.    Artifacts:  Intermittent myogenic and movement artifacts were noted.    ECG:  The heart rate on single channel ECG was predominantly between 90 to 100 BPM.    EEG Classification / Summary:  Abnormal EEG study  Moderate generalized background slowing    -----------------------------------------------------------------------------------------------------    Clinical Impression:  Moderate diffuse/multi-focal cerebral dysfunction, not specific as to etiology.  There were no epileptiform abnormalities recorded.      This is fellow preliminary read, pending attending review.  -------------------------------------------------------------------------------------------------------  [facility] EEG Reading Room Ph#: [phone]  Epilepsy Answering Service after 5PM and before 8:30AM: Ph#: [phone]    EFE Burns  Epilepsy Fellow   MILANA BALL [id number]     Study Date: 10-31-23 13:23-16:32  Duration in hours:  3 hr 9 min    --------------------------------------------------------------------------------------------------  History:  CC/ HPI Patient is a 73y old  Female who presents with a chief complaint of Respiratory distress (31 Oct 2023 17:48)    MEDICATIONS  (STANDING):  albuterol    0.083% 2.5 milliGRAM(s) Nebulizer every 6 hours  apixaban 5 milliGRAM(s) Oral every 12 hours  artificial tears (preservative free) Ophthalmic Solution 1 Drop(s) Both EYES every 4 hours  atorvastatin 10 milliGRAM(s) Oral at bedtime  chlorhexidine 0.12% Liquid 15 milliLiter(s) Oral Mucosa every 12 hours  chlorhexidine 2% Cloths 1 Application(s) Topical daily  ciprofloxacin/hydrocortisone Suspension Otic 4 Drop(s) Left Ear two times a day  dextrose 5%. 1000 milliLiter(s) (50 mL/Hr) IV Continuous <Continuous>  dextrose 5%. 1000 milliLiter(s) (100 mL/Hr) IV Continuous <Continuous>  dextrose 50% Injectable 12.5 Gram(s) IV Push once  dextrose 50% Injectable 25 Gram(s) IV Push once  dextrose 50% Injectable 25 Gram(s) IV Push once  dextrose 50% Injectable 25 Gram(s) IV Push once  droxidopa 600 milliGRAM(s) Oral every 8 hours  epoetin odalis (EPOGEN) Injectable 30074 Unit(s) IV Push <User Schedule>  ferrous    sulfate Liquid 300 milliGRAM(s) Oral daily  glucagon  Injectable 1 milliGRAM(s) IntraMuscular once  insulin lispro (ADMELOG) corrective regimen sliding scale   SubCutaneous every 6 hours  insulin NPH human recombinant 6 Unit(s) SubCutaneous every 6 hours  levETIRAcetam  Solution 1000 milliGRAM(s) Oral daily  levETIRAcetam  Solution 250 milliGRAM(s) Oral <User Schedule>  midodrine. 40 milliGRAM(s) Oral <User Schedule>  norepinephrine Infusion 0.05 MICROgram(s)/kG/Min (6.23 mL/Hr) IV Continuous <Continuous>  pantoprazole  Injectable 40 milliGRAM(s) IV Push two times a day  petrolatum Ophthalmic Ointment 1 Application(s) Both EYES four times a day  sodium chloride 1 Gram(s) Oral two times a day    --------------------------------------------------------------------------------------------------  Study Interpretation:    [Abbreviation Key:  PDR=alpha rhythm/posterior dominant rhythm. A-P=anterior posterior.  Amplitude: ‘very low’:<20; ‘low’:20-49; ‘medium’:; ‘high’:>150uV.  Persistence for periodic/rhythmic patterns (% of epoch) ‘rare’:<1%; ‘occasional’:1-10%; ‘frequent’:10-50%; ‘abundant’:50-90%; ‘continuous’:>90%.  Persistence for sporadic discharges: ‘rare’:<1/hr; ‘occasional’:1/min-1/hr; ‘frequent’:>1/min; ‘abundant’:>1/10 sec.  RPP=rhythmic and periodic patterns; GRDA=generalized rhythmic delta activity; FIRDA=frontal intermittent GRDA; LRDA=lateralized rhythmic delta activity; TIRDA=temporal intermittent rhythmic delta activity;  LPD=PLED=lateralized periodic discharges; GPD=generalized periodic discharges; BIPDs =bilateral independent periodic discharges; Mf=multifocal; SIRPDs=stimulus induced rhythmic, periodic, or ictal appearing discharges; BIRDs=brief potentially ictal rhythmic discharges >4 Hz, lasting .5-10s; PFA (paroxysmal bursts >13 Hz or =8 Hz <10s).  Modifiers: +F=with fast component; +S=with spike component; +R=with rhythmic component.  S-B=burst suppression pattern.  Max=maximal. N1-drowsy; N2-stage II sleep; N3-slow wave sleep. SSS/BETS=small sharp spikes/benign epileptiform transients of sleep. HV=hyperventilation; PS=photic stimulation]    FINDINGS:      Background:  Continuity: continuous  Symmetry: symmetric  PDR: none   State change: present  Voltage: normal  Anterior Posterior Gradient: absent  Other background findings: Predominant background in the most wakeful state consists of diffuse polymorphic theta and delta slowing.  Breach: absent    Background Slowing:  Generalized slowing: as above  Focal slowing: none    State Changes:   -Drowsiness noted with increased slowing, attenuation of the background  -N2 sleep transients not recorded.    Sporadic Epileptiform Discharges:    Occasional left posterior quadrant (T7/P7, O1) spikes  Occasional left frontotemporal (F7/T7) spikes  Occasional right central (C4) spikes  Rare frontally predominant sharp waves, at times bilateral synchronous, at times asynchronous or predominant on the left or the right    Rhythmic and Periodic Patterns (RPPs):  None     Electrographic and Electroclinical seizures:  None    Other Clinical Events:  None    Activation Procedures:   -Hyperventilation was not performed.    -Photic stimulation was not performed.    Artifacts:  Intermittent myogenic and movement artifacts present    Single-lead EKG: Regular rhythm around 120 bpm.    -----------------------------------------------------------------------------------------------------    EEG Classification / Summary:  Abnormal EEG in a lethargic patient.  Occasional left posterior quadrant, left frontotemporal, right central spikes  Rare frontally predominant sharp waves  Moderate generalized background slowing    -----------------------------------------------------------------------------------------------------    Clinical Impression:  Potentially epileptogenic foci in the left posterior quadrant, left frontotemporal, right central regions.  Risk of seizures with onset in the bilateral frontal regions vs. generalized seizures.  Moderate diffuse/multi-focal cerebral dysfunction, not specific as to etiology.  No seizures captured.  Single-lead EKG shows tachycardia.      -------------------------------------------------------------------------------------------------------  [facility] EEG Reading Room Ph#: [phone]  Epilepsy Answering Service after 5PM and before 8:30AM: Ph#: [phone]    EFE Burns  Epilepsy Fellow    Michelle Lopez MD  Attending Physician, [facility]

## 2023-11-01 NOTE — PROGRESS NOTE ADULT - ASSESSMENT
IMPRESSION: 73F w/ HTN, DM2, CVA, breast CA-bilateral mastectomy, recurrent aspiration pneumonia/respiratory failure, and CKD, 8/11/23 p/w acute hypercapnic respiratory failure; c/b RUSS    (1)Renal - RUSS on CKD4 ==>now newly ESRD. Last dialyzed Monday     (2)Hyponatremia - improved     (3)CV- tenuous hemodynamics such with each passing week we have more difficulty performing HD/achieving UF, persistent issue. BP stable with Jacki, though needed pressors with HD on Monday     (4)ID- BCx from 10/14/23 NGTD, BC (10/17) NGTD on IV abx     (5)Pulm- trach/vent-dependent    (6)Anemia- hgb low, epo with HD    RECOMMEND:  (1)HD today 1.5 kg UF; pressors and sodium modelling as needed to keep SBP>90; Epogen with HD   (2)Dose new meds for GFR <10/HD.     Nilda Espinoza, HEAVEN  [facility]  [phone]  IMPRESSION: 73F w/ HTN, DM2, CVA, breast CA-bilateral mastectomy, recurrent aspiration pneumonia/respiratory failure, and CKD, 8/11/23 p/w acute hypercapnic respiratory failure; c/b RUSS    (1)Renal - RUSS on CKD4 ==>now newly ESRD. Last dialyzed Monday     (2)Hyponatremia - improved     (3)CV- tenuous hemodynamics such with each passing week we have more difficulty performing HD/achieving UF, persistent issue. BP stable with Jacki, though needed pressors with HD on Monday     (4)ID- BCx from 10/14/23 NGTD, BC (10/17) NGTD on IV abx     (5)Pulm- trach/vent-dependent    (6)Anemia- hgb low, epo with HD    RECOMMEND:  (1)HD today 1.5 kg UF; pressors and sodium modelling as needed to keep SBP>90; Epogen with HD   (2)Dose new meds for GFR <10/HD.     Nilda Espinoza DNP  [facility]  [phone]     RENAL ATTENDING NOTE  Patient seen and examined on HD  On pressor gtt - we can increase goal UF to 2L today  Volume management discussed with family at bedside    Option of daily vs TIW HD discussed with house staff  - ultimately I favor maintenance of TIW HD for now; I do not believe that switching over to QD HD will allow for a significantly higher amount of net UF to be achieved relative to TIW HD, and moreover I do not believe it will allow for improvement in overall outcome here.      Jimmy Colon MD  [facility]  [phone]

## 2023-11-01 NOTE — PROGRESS NOTE ADULT - SUBJECTIVE AND OBJECTIVE BOX
S:  Pt seen and examined.       Medications: MEDICATIONS  (STANDING):  albuterol    0.083% 2.5 milliGRAM(s) Nebulizer every 6 hours  apixaban 5 milliGRAM(s) Oral every 12 hours  artificial tears (preservative free) Ophthalmic Solution 1 Drop(s) Both EYES every 4 hours  atorvastatin 10 milliGRAM(s) Oral at bedtime  chlorhexidine 0.12% Liquid 15 milliLiter(s) Oral Mucosa every 12 hours  chlorhexidine 2% Cloths 1 Application(s) Topical daily  ciprofloxacin/hydrocortisone Suspension Otic 4 Drop(s) Left Ear two times a day  dextrose 5%. 1000 milliLiter(s) (100 mL/Hr) IV Continuous <Continuous>  dextrose 5%. 1000 milliLiter(s) (50 mL/Hr) IV Continuous <Continuous>  dextrose 50% Injectable 25 Gram(s) IV Push once  dextrose 50% Injectable 25 Gram(s) IV Push once  dextrose 50% Injectable 25 Gram(s) IV Push once  dextrose 50% Injectable 12.5 Gram(s) IV Push once  droxidopa 600 milliGRAM(s) Oral every 8 hours  epoetin odalis (EPOGEN) Injectable 11177 Unit(s) IV Push <User Schedule>  ferrous    sulfate Liquid 300 milliGRAM(s) Oral daily  glucagon  Injectable 1 milliGRAM(s) IntraMuscular once  insulin lispro (ADMELOG) corrective regimen sliding scale   SubCutaneous every 6 hours  insulin NPH human recombinant 6 Unit(s) SubCutaneous every 6 hours  levETIRAcetam  Solution 1000 milliGRAM(s) Oral daily  levETIRAcetam  Solution 250 milliGRAM(s) Oral <User Schedule>  midodrine. 40 milliGRAM(s) Oral <User Schedule>  norepinephrine Infusion 0.05 MICROgram(s)/kG/Min (6.23 mL/Hr) IV Continuous <Continuous>  pantoprazole  Injectable 40 milliGRAM(s) IV Push two times a day  petrolatum Ophthalmic Ointment 1 Application(s) Both EYES four times a day  sodium chloride 1 Gram(s) Oral two times a day    MEDICATIONS  (PRN):  acetaminophen   Oral Liquid .. 650 milliGRAM(s) Oral every 6 hours PRN Temp greater or equal to 38C (100.4F), Mild Pain (1 - 3)  dextrose Oral Gel 15 Gram(s) Oral once PRN Blood Glucose LESS THAN 70 milliGRAM(s)/deciliter  diphenhydrAMINE Elixir 50 milliGRAM(s) Oral once PRN Rash and/or Itching  droxidopa 200 milliGRAM(s) Oral <User Schedule> PRN Prior to hemodialysis  midodrine. 20 milliGRAM(s) Oral <User Schedule> PRN 1 Hour Pre HD  sodium chloride 0.9% Bolus. 250 milliLiter(s) IV Bolus every 5 minutes PRN SBP LESS THAN or EQUAL to 90 mmHg  sodium chloride 0.9% lock flush 10 milliLiter(s) IV Push every 1 hour PRN Pre/post blood products, medications, blood draw, and to maintain line patency       Vitals:  Vital Signs Last 24 Hrs  T(C): 37.3 (01 Nov 2023 08:00), Max: 37.3 (01 Nov 2023 00:00)  T(F): 99.2 (01 Nov 2023 08:00), Max: 99.2 (01 Nov 2023 08:00)  HR: 116 (01 Nov 2023 16:20) (101 - 125)  BP: --  BP(mean): --  RR: 24 (01 Nov 2023 16:20) (21 - 30)  SpO2: 100% (01 Nov 2023 16:20) (94% - 100%)    Parameters below as of 01 Nov 2023 16:20  Patient On (Oxygen Delivery Method): ventilator            Neurological Exam:  Mental Status: Eyes closed, minimal spont eye opening off sedation. Does not follow commands,  + trach to vent and NGT   Cranial Nerves: PERRL slightly sluggish, L subconjunctival hemorrhage improved. R facial twitching noted by mouth - ? suppressible. occasional head dystonia  Motor: Moves upper extremities spontaneously R >L, tremor R > L  no movement noted in lowers  Sensation: WD to noxious x4    I personally reviewed the below data/images/labs:      LABS:                          8.0    15.30 )-----------( 434      ( 01 Nov 2023 01:30 )             26.8     11-01    137  |  102  |  26<H>  ----------------------------<  161<H>  4.6   |  27  |  1.20    Ca    8.9      01 Nov 2023 01:30  Phos  4.9     11-01  Mg     2.30     11-01    TPro  6.3  /  Alb  2.1<L>  /  TBili  <0.2  /  DBili  x   /  AST  16  /  ALT  9   /  AlkPhos  232<H>  11-01    LIVER FUNCTIONS - ( 01 Nov 2023 01:30 )  Alb: 2.1 g/dL / Pro: 6.3 g/dL / ALK PHOS: 232 U/L / ALT: 9 U/L / AST: 16 U/L / GGT: x           PTT - ( 01 Nov 2023 01:30 )  PTT:33.7 sec  Urinalysis Basic - ( 01 Nov 2023 01:30 )    Color: x / Appearance: x / SG: x / pH: x  Gluc: 161 mg/dL / Ketone: x  / Bili: x / Urobili: x   Blood: x / Protein: x / Nitrite: x   Leuk Esterase: x / RBC: x / WBC x   Sq Epi: x / Non Sq Epi: x / Bacteria: x          < from: CT Head No Cont (08.15.23 @ 17:20) >    ACC: [id number] EXAM:  CT BRAIN   ORDERED BY: MINAL SAM     PROCEDURE DATE:  08/15/2023          INTERPRETATION:  Clinical indication: Change in neuro exam.    Multiple axial sections were performed from base to vertex without   contrast enhancement. Coronal and sagittal reconstructions were   reformatted well.    This exam is compared prior head CT performed on August 11, 2023    Parenchymal volume loss and chronic microvessel ischemic changes are   again seen.    Abnormal low-attenuation involving the left occipital cortical   subcortical region is again seen. 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    No evidence of acute hemorrhage mass or mass effect is seen.    Evaluation of the osseous structures with appropriate window demonstrates   sclerotic changes about the left mastoid region which appears stable.   Opacification left middle ear region is again seen.    Patient is status post bilateral cataract surgery.    Impression: Stable exam.    < end of copied text >    vEEG:    Clinical Impression:  - Severe diffuse non-specific cerebral dysfunction  - There were no epileptiform abnormalities or seizures recorded.        CTH 8/11:    VENTRICLES AND SULCI: Age-appropriate involutional change  INTRA-AXIAL:  Old left PCA infarct as seen on the prior unchanged.   Microvascular ischemic changes involving the periventricular and   subcortical white matter as seen previously  EXTRA-AXIAL:  No mass or collection is seen.  VISUALIZED SINUSES:  Clear.  VISUALIZED MASTOIDS: Left mastoid sclerosis  CALVARIUM: Infiltrative appearance tothe calvarium may be indicative of   marrow infiltration on the basis of patient's known diagnosis of breast   cancer. MISCELLANEOUS:  None.    IMPRESSION:  No significant interval change compared with 7/17/2023 in   left PCA infarct which is old. Microvascular ischemic changes involving   the periventricular and subcortical white matter as seen   previously.Questionable lesions at the level of the calvarium related to   possible breast CA. Clinical correlation recommended.    --- End of Report ---      ct< from: CT Head No Cont (09.26.23 @ 21:02) >  IMPRESSION: Vague questionable areas of hypoattenuation within the   bilateral cerebellar hemispheres which may be compatible with   acute/subacute ischemia. Recommend further evaluation with a brain MRI   study, provided there are no MRI contraindications.    No acute intracranial hemorrhage.    Previously seen questionable vague wedge-shaped area of hypoattenuation   in the left parietal lobe with artifactual secondary to motion and volume   averaging with a prominent sulcus.    Chronic left occipital lobe infarct.    < end of copied text >    < from: CT Head No Cont (10.03.23 @ 20:46) >    IMPRESSION:    No acute intracranial hemorrhage or mass effect. Evaluation of the   posterior fossa degraded by streak artifact from dental amalgam.   Lucencies in the bilateral cerebellum may be artifactual.    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    Bilateral middle ear and mastoid effusions which are also seen on prior   exam.    EEG Classification / Summary:  Abnormal EEG in the awake, drowsy states.   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   -No seizures

## 2023-11-01 NOTE — PROGRESS NOTE ADULT - PROVIDER SPECIALTY LIST ADULT
[FreeTextEntry1] : MATHEW.\par Follow up in one year.\par Continue follow up with dermatology. Neurology

## 2023-11-01 NOTE — PROGRESS NOTE ADULT - ASSESSMENT
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       NEUROLOGY  #AMS  Multiple etiologies including active infection vs CVA.   MR head performed w/ new infarct and old infarcts, EEG without seizure events but with high foci potential   - f/u spot EEG given facial twitching  - f/u repeat MRI --> per neuro, can pursue MRI after EEG to aid in neuroprognostication as patient is not waking up  - continue lipitor  - continue keppra given above  - aspirin held 10/11, likely in s/o GIB, Hgb stable not requiring pRBC since 10/14      CARDIOVASCULAR  # Septic vs vasoplegic shock   Etiology likely septic iso active infection. Possible component of vasoplegic, however no longer with active infection or on sedation. Off IV vasopressors.   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   - resolved    RESPIRATORY  # AHRF second to volume overload   - CKD vs HFpEF w/ hypercarbia 2/2 volume overload requiring intubation, trach, and HD initiation  - Continue on albuterol and chest PT  - Continue volume removal with HD      #tracheomalacia   - CT CHEST (9/8) with tracheomalacia, BL pleural effusions and continued consolidations     #mechanical ventilation   - PC 24/43/8/35     GI  # UGIB: last pRBC transfused 10/14  - Continue on PPI BID    # Dysphagia   - NGT-TF     RENAL  # ESRD  - HD MWF TIW with midodrine and droxidopa  - ICU for vasopressor assisted volume removal, cap to 1 pressor and not offering CRRT due to comorbidities and prognosis   - f/u w/ nephrology on 10/31: will continue to offer patient 1-pressor assisted HD as agreed prior, as long as she can tolerate HD maxed on 1 pressor, she will be considered iHD candidate    INFECTIOUS DISEASE  # 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 Coli sputum Cx, BAL w/ MSSA  - treated with abx   - eosinophilia workup     # Proteus and  PSA VAP/ Trachitis   - Overall difficulty with ABX tailoring given allergic rxns and resistant organisms  - dc aztreonam    # MAC   - outpatient treatment    HEME  # Anemia second to GIB vs renal disease   - received transfusions during stay  - management as per renal, PPI    VASCULAR   # LLE POP DVT   - on Eliquis    # HD access  - TRISHA TAYLOR (9/27 - 10/21)  - TRISHA taylor replaced for HD     ONC   # Hx of Breast CA   - Patient dx in 2018 and s/p BL mastectomy (radical on right) and chemo and RT.     ENDOCRINE  # IDDM2   - Continued on NPH with ISS, however noted with hypoglycemic episodes and NPH dc'ed.    - Finger sticks trending up off NPH.   - Continue on NPH 6U with moderate ISS.   - Adjust as need     SKIN  # Drug Eruption   - treated    ETHICS/ GOC    - Attempted palliative discussion however family not interested and wishes for FULL CODE  - Family continues to want everything done despite HD failure on multiple oral pressor 74 YO F with PMHx of IDDM2, HTN, Diabetic Nephropathic CKD, HFpEF, Breast Cancer s/p BL mastectomy, chemotherapy and radiation (2018), Respiratory and Cardiac Arrest (2018), 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       NEUROLOGY  #AMS  Multiple etiologies including active infection vs CVA.   MR head performed w/ new infarct and old infarcts, EEG without seizure events but with high foci potential   - f/u spot EEG given facial twitching  - f/u repeat MRI --> per neuro, can pursue MRI after EEG to aid in neuroprognostication as patient is not waking up  - continue lipitor  - continue keppra given above  - aspirin held 10/11, likely in s/o GIB, Hgb stable not requiring pRBC since 10/14      CARDIOVASCULAR  # Septic vs vasoplegic shock   Etiology likely septic iso active infection. Possible component of vasoplegic, however no longer with active infection or on sedation. Off IV vasopressors.   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   - resolved    RESPIRATORY  # AHRF second to volume overload   - CKD vs HFpEF w/ hypercarbia 2/2 volume overload requiring intubation, trach, and HD initiation  - Continue on albuterol and chest PT  - Continue volume removal with HD      #tracheomalacia   - CT CHEST (9/8) with tracheomalacia, BL pleural effusions and continued consolidations     #mechanical ventilation   - PC 24/43/8/35     GI  # UGIB: last pRBC transfused 10/14  - Continue on PPI BID    # Dysphagia   - NGT-TF     RENAL  # ESRD  - HD MWF TIW with midodrine and droxidopa  - ICU for vasopressor assisted volume removal, cap to 1 pressor and not offering CRRT due to comorbidities and prognosis   - f/u w/ nephrology: will continue to offer patient 1-pressor assisted HD as agreed prior, as long as she can tolerate HD maxed on 1 pressor, she will be considered iHD candidate  - UF -2.5L attempted 10/30, reached 1.7L limited by hypotension and tachycardia to 130s    INFECTIOUS DISEASE  # 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 Coli sputum Cx, BAL w/ MSSA  - treated with abx   - eosinophilia workup     # Proteus and  PSA VAP/ Trachitis   - Overall difficulty with ABX tailoring given allergic rxns and resistant organisms  - dc aztreonam    # MAC   - outpatient treatment    HEME  # Anemia second to GIB vs renal disease   - received transfusions during stay  - management as per renal, PPI    VASCULAR   # LLE POP DVT   - on Eliquis    # HD access  - TRISHA TAYLOR (9/27 - 10/21)  - TRISHA taylor replaced for HD     ONC   # Hx of Breast CA   - Patient dx in 2018 and s/p BL mastectomy (radical on right) and chemo and RT.     ENDOCRINE  # IDDM2   - Continued on NPH with ISS, however noted with hypoglycemic episodes and NPH dc'ed.    - Finger sticks trending up off NPH.   - Continue on NPH 6U with moderate ISS.   - Adjust as need     SKIN  # Drug Eruption   - treated    ETHICS/ GOC    - Attempted palliative discussion however family not interested and wishes for FULL CODE  - Family continues to want everything done despite HD failure on multiple oral pressor

## 2023-11-01 NOTE — PROGRESS NOTE ADULT - SUBJECTIVE AND OBJECTIVE BOX
Subjective: Patient seen and examined. No new events except as noted.   remains in ICU       REVIEW OF SYSTEMS:    Unable to obtain    MEDICATIONS:  MEDICATIONS  (STANDING):  albuterol    0.083% 2.5 milliGRAM(s) Nebulizer every 6 hours  apixaban 5 milliGRAM(s) Oral every 12 hours  artificial tears (preservative free) Ophthalmic Solution 1 Drop(s) Both EYES every 4 hours  atorvastatin 10 milliGRAM(s) Oral at bedtime  chlorhexidine 0.12% Liquid 15 milliLiter(s) Oral Mucosa every 12 hours  chlorhexidine 2% Cloths 1 Application(s) Topical daily  ciprofloxacin/hydrocortisone Suspension Otic 4 Drop(s) Left Ear two times a day  dextrose 5%. 1000 milliLiter(s) (100 mL/Hr) IV Continuous <Continuous>  dextrose 5%. 1000 milliLiter(s) (50 mL/Hr) IV Continuous <Continuous>  dextrose 50% Injectable 12.5 Gram(s) IV Push once  dextrose 50% Injectable 25 Gram(s) IV Push once  dextrose 50% Injectable 25 Gram(s) IV Push once  dextrose 50% Injectable 25 Gram(s) IV Push once  droxidopa 600 milliGRAM(s) Oral every 8 hours  epoetin odalis (EPOGEN) Injectable 14511 Unit(s) IV Push <User Schedule>  ferrous    sulfate Liquid 300 milliGRAM(s) Oral daily  glucagon  Injectable 1 milliGRAM(s) IntraMuscular once  insulin lispro (ADMELOG) corrective regimen sliding scale   SubCutaneous every 6 hours  insulin NPH human recombinant 6 Unit(s) SubCutaneous every 6 hours  levETIRAcetam  Solution 1000 milliGRAM(s) Oral daily  levETIRAcetam  Solution 250 milliGRAM(s) Oral <User Schedule>  midodrine. 40 milliGRAM(s) Oral <User Schedule>  norepinephrine Infusion 0.05 MICROgram(s)/kG/Min (6.23 mL/Hr) IV Continuous <Continuous>  pantoprazole  Injectable 40 milliGRAM(s) IV Push two times a day  petrolatum Ophthalmic Ointment 1 Application(s) Both EYES four times a day  sodium chloride 1 Gram(s) Oral two times a day      PHYSICAL EXAM:  T(C): 37.3 (11-01-23 @ 08:00), Max: 37.4 (10-31-23 @ 16:00)  HR: 104 (11-01-23 @ 09:52) (101 - 125)  BP: --  RR: 24 (11-01-23 @ 08:00) (16 - 30)  SpO2: 100% (11-01-23 @ 09:52) (94% - 100%)  Wt(kg): --  I&O's Summary    31 Oct 2023 07:01  -  01 Nov 2023 07:00  --------------------------------------------------------  IN: 890 mL / OUT: 0 mL / NET: 890 mL            Appearance: NAD, +trach  HEENT: dry oral mucosa  Lymphatic: No lymphadenopathy  Cardiovascular: Normal S1 S2, No JVD, No murmurs, No edema  Respiratory: Decreased BS, + trach to vent	  Neuro: opens eyes  Gastrointestinal: Soft, Non-tender, + BS, + NGT  Skin: No rashes, No ecchymoses, No cyanosis	  Extremities: No strength/ROM 2/2 sedation, + BL LE edema  Vascular: Peripheral pulses palpable 2+ bilaterally  Anasarca   Mode: AC/ CMV (Assist Control/ Continuous Mandatory Ventilation), RR (machine): 24, FiO2: 35, PEEP: 8, ITime: 0.8, MAP: 19, PC: 35, PIP: 43      LABS:    CARDIAC MARKERS:    TECH INFORMATION:   This is a Routine EEG.     Placement and Labeling of Electrodes: The EEG was performed utilizing at least 20 channel referential EEG connections (coronal over temporal over parasagittal montage) with inferior temporal electrodes when indicting and using all standard 10-20 electrode placements with EKG, with additional electrodes placed in the inferior temporal region using the modified 10-10 montage electrode placements for elective admissions, or if deemed necessary.  Recording was at  a sampling rate of 256 samples per second per channel. Time synchronized digital video recording was done simultaneously with EEG recording.  A low light infrared camera was used for low light recording..    EEG REPORT:   EEG Report:  · EEG Report	  MILANA BALL [id number]     Study Date: 		10-31-23  Duration in hours:  3h    --------------------------------------------------------------------------------------------------  History:  CC/ HPI Patient is a 73y old  Female who presents with a chief complaint of Respiratory distress (31 Oct 2023 17:48)    MEDICATIONS  (STANDING):  albuterol    0.083% 2.5 milliGRAM(s) Nebulizer every 6 hours  apixaban 5 milliGRAM(s) Oral every 12 hours  artificial tears (preservative free) Ophthalmic Solution 1 Drop(s) Both EYES every 4 hours  atorvastatin 10 milliGRAM(s) Oral at bedtime  chlorhexidine 0.12% Liquid 15 milliLiter(s) Oral Mucosa every 12 hours  chlorhexidine 2% Cloths 1 Application(s) Topical daily  ciprofloxacin/hydrocortisone Suspension Otic 4 Drop(s) Left Ear two times a day  dextrose 5%. 1000 milliLiter(s) (50 mL/Hr) IV Continuous <Continuous>  dextrose 5%. 1000 milliLiter(s) (100 mL/Hr) IV Continuous <Continuous>  dextrose 50% Injectable 12.5 Gram(s) IV Push once  dextrose 50% Injectable 25 Gram(s) IV Push once  dextrose 50% Injectable 25 Gram(s) IV Push once  dextrose 50% Injectable 25 Gram(s) IV Push once  droxidopa 600 milliGRAM(s) Oral every 8 hours  epoetin odalis (EPOGEN) Injectable 01706 Unit(s) IV Push <User Schedule>  ferrous    sulfate Liquid 300 milliGRAM(s) Oral daily  glucagon  Injectable 1 milliGRAM(s) IntraMuscular once  insulin lispro (ADMELOG) corrective regimen sliding scale   SubCutaneous every 6 hours  insulin NPH human recombinant 6 Unit(s) SubCutaneous every 6 hours  levETIRAcetam  Solution 1000 milliGRAM(s) Oral daily  levETIRAcetam  Solution 250 milliGRAM(s) Oral <User Schedule>  midodrine. 40 milliGRAM(s) Oral <User Schedule>  norepinephrine Infusion 0.05 MICROgram(s)/kG/Min (6.23 mL/Hr) IV Continuous <Continuous>  pantoprazole  Injectable 40 milliGRAM(s) IV Push two times a day  petrolatum Ophthalmic Ointment 1 Application(s) Both EYES four times a day  sodium chloride 1 Gram(s) Oral two times a day    --------------------------------------------------------------------------------------------------  Study Interpretation:    [Abbreviation Key:  PDR=alpha rhythm/posterior dominant rhythm. A-P=anterior posterior.  Amplitude: ‘very low’:<20; ‘low’:20-49; ‘medium’:; ‘high’:>150uV.  Persistence for periodic/rhythmic patterns (% of epoch) ‘rare’:<1%; ‘occasional’:1-10%; ‘frequent’:10-50%; ‘abundant’:50-90%; ‘continuous’:>90%.  Persistence for sporadic discharges: ‘rare’:<1/hr; ‘occasional’:1/min-1/hr; ‘frequent’:>1/min; ‘abundant’:>1/10 sec.  RPP=rhythmic and periodic patterns; GRDA=generalized rhythmic delta activity; FIRDA=frontal intermittent GRDA; LRDA=lateralized rhythmic delta activity; TIRDA=temporal intermittent rhythmic delta activity;  LPD=PLED=lateralized periodic discharges; GPD=generalized periodic discharges; BIPDs =bilateral independent periodic discharges; Mf=multifocal; SIRPDs=stimulus induced rhythmic, periodic, or ictal appearing discharges; BIRDs=brief potentially ictal rhythmic discharges >4 Hz, lasting .5-10s; PFA (paroxysmal bursts >13 Hz or =8 Hz <10s).  Modifiers: +F=with fast component; +S=with spike component; +R=with rhythmic component.  S-B=burst suppression pattern.  Max=maximal. N1-drowsy; N2-stage II sleep; N3-slow wave sleep. SSS/BETS=small sharp spikes/benign epileptiform transients of sleep. HV=hyperventilation; PS=photic stimulation]    FINDINGS:      Background:  Continuity: continuous  Symmetry: symmetric  PDR: none   Reactivity: present  Voltage: normal (between 20-150uV)  Anterior Posterior Gradient: present  Other background findings: none  Breach: absent    Background Slowing:  Generalized slowing: diffuse irregular delta and theta activity.  Focal slowing: none was present.    State Changes:   -Drowsiness noted with increased slowing, attenuation of fast activity  -N2 sleep transients were not recorded.    Sporadic Epileptiform Discharges:    None    Rhythmic and Periodic Patterns (RPPs):  None     Electrographic and Electroclinical seizures:  None    Other Clinical Events:  None    Activation Procedures:   -Hyperventilation was not performed.    -Photic stimulation was not performed.    Artifacts:  Intermittent myogenic and movement artifacts were noted.    ECG:  The heart rate on single channel ECG was predominantly between 90 to 100 BPM.    EEG Classification / Summary:  Abnormal EEG study  Moderate generalized background slowing    -----------------------------------------------------------------------------------------------------    Clinical Impression:  Moderate diffuse/multi-focal cerebral dysfunction, not specific as to etiology.  There were no epileptiform abnormalities recorded.      This is fellow preliminary read, pending attending review.  -------------------------------------------------------------------------------------------------------  [facility] EEG Reading Room Ph#: [phone]  Epilepsy Answering Service after 5PM and before 8:30AM: Ph#: [phone]    EFE Burns  Epilepsy Fellow          Electronic Signatures:  Rafa Cunningham)  (Signature Pending)  	Co-Signer: TECH INFORMATION, EEG REPORT  Iris Holcomb)  (Signed 31-Oct-2023 18:16)  	Authored: TECH INFORMATION, EEG REPORT      Last Updated: 31-Oct-2023 18:16 by Iris Holcomb)                            8.0    15.30 )-----------( 434      ( 01 Nov 2023 01:30 )             26.8     11-01    137  |  102  |  26<H>  ----------------------------<  161<H>  4.6   |  27  |  1.20    Ca    8.9      01 Nov 2023 01:30  Phos  4.9     11-01  Mg     2.30     11-01    TPro  6.3  /  Alb  2.1<L>  /  TBili  <0.2  /  DBili  x   /  AST  16  /  ALT  9   /  AlkPhos  232<H>  11-01    proBNP:   Lipid Profile:   HgA1c:   TSH:             TELEMETRY: 	    ECG:  	  RADIOLOGY:   DIAGNOSTIC TESTING:  [ ] Echocardiogram:  [ ]  Catheterization:  [ ] Stress Test:    OTHER:

## 2023-11-01 NOTE — PROGRESS NOTE ADULT - SUBJECTIVE AND OBJECTIVE BOX
NEPHROLOGY     Patient seen and examined.    MEDICATIONS  (STANDING):  albuterol    0.083% 2.5 milliGRAM(s) Nebulizer every 6 hours  apixaban 5 milliGRAM(s) Oral every 12 hours  artificial tears (preservative free) Ophthalmic Solution 1 Drop(s) Both EYES every 4 hours  atorvastatin 10 milliGRAM(s) Oral at bedtime  chlorhexidine 0.12% Liquid 15 milliLiter(s) Oral Mucosa every 12 hours  chlorhexidine 2% Cloths 1 Application(s) Topical daily  ciprofloxacin/hydrocortisone Suspension Otic 4 Drop(s) Left Ear two times a day  dextrose 5%. 1000 milliLiter(s) (50 mL/Hr) IV Continuous <Continuous>  dextrose 5%. 1000 milliLiter(s) (100 mL/Hr) IV Continuous <Continuous>  dextrose 50% Injectable 12.5 Gram(s) IV Push once  dextrose 50% Injectable 25 Gram(s) IV Push once  dextrose 50% Injectable 25 Gram(s) IV Push once  dextrose 50% Injectable 25 Gram(s) IV Push once  droxidopa 600 milliGRAM(s) Oral every 8 hours  epoetin odalis (EPOGEN) Injectable 08602 Unit(s) IV Push <User Schedule>  ferrous    sulfate Liquid 300 milliGRAM(s) Oral daily  glucagon  Injectable 1 milliGRAM(s) IntraMuscular once  insulin lispro (ADMELOG) corrective regimen sliding scale   SubCutaneous every 6 hours  insulin NPH human recombinant 6 Unit(s) SubCutaneous every 6 hours  levETIRAcetam  Solution 1000 milliGRAM(s) Oral daily  levETIRAcetam  Solution 250 milliGRAM(s) Oral <User Schedule>  midodrine. 40 milliGRAM(s) Oral <User Schedule>  norepinephrine Infusion 0.05 MICROgram(s)/kG/Min (6.23 mL/Hr) IV Continuous <Continuous>  pantoprazole  Injectable 40 milliGRAM(s) IV Push two times a day  petrolatum Ophthalmic Ointment 1 Application(s) Both EYES four times a day  sodium chloride 1 Gram(s) Oral two times a day    VITALS:  T(C): , Max: 37.4 (10-31-23 @ 16:00)  T(F): , Max: 99.4 (10-31-23 @ 16:00)  HR: 107 (11-01-23 @ 13:00)  BP: --  RR: 24 (11-01-23 @ 13:00)  SpO2: 100% (11-01-23 @ 13:00)    I and O's:    10-31 @ 07:01  -  11-01 @ 07:00  --------------------------------------------------------  IN: 890 mL / OUT: 0 mL / NET: 890 mL    PHYSICAL EXAM:  Constitutional: critically ill, trach to vent   HEENT: + NGT, + tracheostomy   Respiratory: coarse BS  Cardiovascular: tachy   Gastrointestinal: BS+, soft, NT/ND  Extremities: ++ peripheral edema  Neurological: UTO  : No Wheeler  Skin: No rashes  Access: [facility]     LABS:                        8.0    15.30 )-----------( 434      ( 01 Nov 2023 01:30 )             26.8     11-01    137  |  102  |  26<H>  ----------------------------<  161<H>  4.6   |  27  |  1.20    Ca    8.9      01 Nov 2023 01:30  Phos  4.9     11-01  Mg     2.30     11-01    TPro  6.3  /  Alb  2.1<L>  /  TBili  <0.2  /  DBili  x   /  AST  16  /  ALT  9   /  AlkPhos  232<H>  11-01   NEPHROLOGY     Patient seen and examined with spouse at bedside, trach to vent.     MEDICATIONS  (STANDING):  albuterol    0.083% 2.5 milliGRAM(s) Nebulizer every 6 hours  apixaban 5 milliGRAM(s) Oral every 12 hours  artificial tears (preservative free) Ophthalmic Solution 1 Drop(s) Both EYES every 4 hours  atorvastatin 10 milliGRAM(s) Oral at bedtime  chlorhexidine 0.12% Liquid 15 milliLiter(s) Oral Mucosa every 12 hours  chlorhexidine 2% Cloths 1 Application(s) Topical daily  ciprofloxacin/hydrocortisone Suspension Otic 4 Drop(s) Left Ear two times a day  dextrose 5%. 1000 milliLiter(s) (50 mL/Hr) IV Continuous <Continuous>  dextrose 5%. 1000 milliLiter(s) (100 mL/Hr) IV Continuous <Continuous>  dextrose 50% Injectable 12.5 Gram(s) IV Push once  dextrose 50% Injectable 25 Gram(s) IV Push once  dextrose 50% Injectable 25 Gram(s) IV Push once  dextrose 50% Injectable 25 Gram(s) IV Push once  droxidopa 600 milliGRAM(s) Oral every 8 hours  epoetin odalis (EPOGEN) Injectable 52306 Unit(s) IV Push <User Schedule>  ferrous    sulfate Liquid 300 milliGRAM(s) Oral daily  glucagon  Injectable 1 milliGRAM(s) IntraMuscular once  insulin lispro (ADMELOG) corrective regimen sliding scale   SubCutaneous every 6 hours  insulin NPH human recombinant 6 Unit(s) SubCutaneous every 6 hours  levETIRAcetam  Solution 1000 milliGRAM(s) Oral daily  levETIRAcetam  Solution 250 milliGRAM(s) Oral <User Schedule>  midodrine. 40 milliGRAM(s) Oral <User Schedule>  norepinephrine Infusion 0.05 MICROgram(s)/kG/Min (6.23 mL/Hr) IV Continuous <Continuous>  pantoprazole  Injectable 40 milliGRAM(s) IV Push two times a day  petrolatum Ophthalmic Ointment 1 Application(s) Both EYES four times a day  sodium chloride 1 Gram(s) Oral two times a day    VITALS:  T(C): , Max: 37.4 (10-31-23 @ 16:00)  T(F): , Max: 99.4 (10-31-23 @ 16:00)  HR: 107 (11-01-23 @ 13:00)  BP: --  RR: 24 (11-01-23 @ 13:00)  SpO2: 100% (11-01-23 @ 13:00)    I and O's:    10-31 @ 07:01  -  11-01 @ 07:00  --------------------------------------------------------  IN: 890 mL / OUT: 0 mL / NET: 890 mL    PHYSICAL EXAM:  Constitutional: critically ill, trach to vent   HEENT: + NGT, + tracheostomy   Respiratory: coarse BS  Cardiovascular: tachy   Gastrointestinal: BS+, soft, NT/ND  Extremities: ++ peripheral edema  Neurological: UTO  : No Wheeler  Skin: No rashes  Access: [facility]     LABS:                        8.0    15.30 )-----------( 434      ( 01 Nov 2023 01:30 )             26.8     11-01    137  |  102  |  26<H>  ----------------------------<  161<H>  4.6   |  27  |  1.20    Ca    8.9      01 Nov 2023 01:30  Phos  4.9     11-01  Mg     2.30     11-01    TPro  6.3  /  Alb  2.1<L>  /  TBili  <0.2  /  DBili  x   /  AST  16  /  ALT  9   /  AlkPhos  232<H>  11-01

## 2023-11-01 NOTE — PROGRESS NOTE ADULT - ASSESSMENT
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 While in MICU patient noted with worsening renal failure requiring HD initiation, CGE/ Melena s/p EGD 8/31 found with esophagitis and erosive gastropathy, new right cerebellar infarct and fevers second to ESBL COLI and MSSA VAP, MSSA bacteremia, left ear otitis externa and drug rxn to cephalosporins Course further complicated by prolonged vent time s/p tracheostomy 9/1 and transferred to RCU 9/3 completed by recurrent fevers with high PIP, respiratory acidosis and concern for volume overloaded.   CTH repeated 9/26 for concern for encephalopathy - has known now - chronic bilateral cerebellar infarcts, L PCA infarct. L parietal hypodensity seen on prior CT likely artifactual  Repeat CTH 10/3 - unchanged  EEG 10/5 with L posterocentral/temporal spikes/sharp waves - doubt true focal seizure upon further review of EEG     Impression:   R/o new focal seizure - on Keppra  Suspect underlying movement d/o - PD?  Metabolic encephalopathy related to shock, infection, doubt new stroke  Multiple embolic strokes s/p ILR without events  Dementia   MSSA bacteremia, s/p Daptomycin  Acute popliteal DVT on Eliquis   Anemia     Recommendations   [] care per MICU for persistent hypotension  [] has multiple areas of epileptogenic potential on EEG, no clear seizure correlate seen. On Keppra but no improvement in mental status- new R facial twitching, would get 24h EEG and if negative can d.c  [] consider MRI brain once stable but doubt will change mgmt  [] mgmt of hypotension per picristiany team, remains full code  [] Keppra 500mg BID with extra 250mg after HD on HD days  [] Abx per ID  [] C/w home Atorvastatin 10 mg qhs   [] continue to address GOC with family, poor prognosis    Katty Charles DO  Vascular Neurology  Office [phone]

## 2023-11-01 NOTE — PROGRESS NOTE ADULT - SUBJECTIVE AND OBJECTIVE BOX
MICU Progress Note    INTERVAL HPI/OVERNIGHT EVENTS: 22 beats Vtach overnight. Lytes wnl.     SUBJECTIVE: Patient seen and examined at bedside.     CONSTITUTIONAL: No weakness, fevers or chills  EYES/ENT: No visual changes;  No vertigo or throat pain   NECK: No pain or stiffness  RESPIRATORY: No cough, wheezing, hemoptysis; No shortness of breath  CARDIOVASCULAR: No chest pain or palpitations  GASTROINTESTINAL: No abdominal or epigastric pain. No nausea, vomiting, or hematemesis; No diarrhea or constipation. No melena or hematochezia.  GENITOURINARY: No dysuria, frequency or hematuria  NEUROLOGICAL: No numbness or weakness  SKIN: No itching, rashes    OBJECTIVE:    VITAL SIGNS:  ICU Vital Signs Last 24 Hrs  T(C): 37.1 (01 Nov 2023 04:00), Max: 37.4 (31 Oct 2023 08:00)  T(F): 98.7 (01 Nov 2023 04:00), Max: 99.4 (31 Oct 2023 08:00)  HR: 113 (01 Nov 2023 07:00) (102 - 125)  BP: 105/52 (31 Oct 2023 08:00) (105/52 - 105/52)  BP(mean): 69 (31 Oct 2023 08:00) (69 - 69)  ABP: 154/65 (01 Nov 2023 07:00) (118/52 - 156/69)  ABP(mean): 102 (01 Nov 2023 07:00) (74 - 107)  RR: 23 (01 Nov 2023 07:00) (16 - 30)  SpO2: 100% (01 Nov 2023 07:00) (94% - 100%)    O2 Parameters below as of 01 Nov 2023 06:00  Patient On (Oxygen Delivery Method): ventilator    O2 Concentration (%): 50      Mode: AC/ CMV (Assist Control/ Continuous Mandatory Ventilation), RR (machine): 24, FiO2: 35, PEEP: 8, ITime: 0.8, MAP: 19, PC: 35, PIP: 44    10-31 @ 07:01  -  11-01 @ 07:00  --------------------------------------------------------  IN: 890 mL / OUT: 0 mL / NET: 890 mL      CAPILLARY BLOOD GLUCOSE      POCT Blood Glucose.: 156 mg/dL (01 Nov 2023 05:26)      PHYSICAL EXAM:    General: NAD  HEENT: NC/AT; PERRL, clear conjunctiva  Neck: supple  Respiratory: CTA b/l  Cardiovascular: +S1/S2; RRR  Abdomen: soft, NT/ND; +BS x4  Extremities: WWP, 2+ peripheral pulses b/l; no LE edema  Skin: normal color and turgor; no rash  Neurological:    MEDICATIONS:  MEDICATIONS  (STANDING):  albuterol    0.083% 2.5 milliGRAM(s) Nebulizer every 6 hours  apixaban 5 milliGRAM(s) Oral every 12 hours  artificial tears (preservative free) Ophthalmic Solution 1 Drop(s) Both EYES every 4 hours  atorvastatin 10 milliGRAM(s) Oral at bedtime  chlorhexidine 0.12% Liquid 15 milliLiter(s) Oral Mucosa every 12 hours  chlorhexidine 2% Cloths 1 Application(s) Topical daily  ciprofloxacin/hydrocortisone Suspension Otic 4 Drop(s) Left Ear two times a day  dextrose 5%. 1000 milliLiter(s) (50 mL/Hr) IV Continuous <Continuous>  dextrose 5%. 1000 milliLiter(s) (100 mL/Hr) IV Continuous <Continuous>  dextrose 50% Injectable 12.5 Gram(s) IV Push once  dextrose 50% Injectable 25 Gram(s) IV Push once  dextrose 50% Injectable 25 Gram(s) IV Push once  dextrose 50% Injectable 25 Gram(s) IV Push once  droxidopa 600 milliGRAM(s) Oral every 8 hours  epoetin odalis (EPOGEN) Injectable 75507 Unit(s) IV Push <User Schedule>  ferrous    sulfate Liquid 300 milliGRAM(s) Oral daily  glucagon  Injectable 1 milliGRAM(s) IntraMuscular once  insulin lispro (ADMELOG) corrective regimen sliding scale   SubCutaneous every 6 hours  insulin NPH human recombinant 6 Unit(s) SubCutaneous every 6 hours  levETIRAcetam  Solution 1000 milliGRAM(s) Oral daily  levETIRAcetam  Solution 250 milliGRAM(s) Oral <User Schedule>  midodrine. 40 milliGRAM(s) Oral <User Schedule>  norepinephrine Infusion 0.05 MICROgram(s)/kG/Min (6.23 mL/Hr) IV Continuous <Continuous>  pantoprazole  Injectable 40 milliGRAM(s) IV Push two times a day  petrolatum Ophthalmic Ointment 1 Application(s) Both EYES four times a day  sodium chloride 1 Gram(s) Oral two times a day    MEDICATIONS  (PRN):  acetaminophen   Oral Liquid .. 650 milliGRAM(s) Oral every 6 hours PRN Temp greater or equal to 38C (100.4F), Mild Pain (1 - 3)  dextrose Oral Gel 15 Gram(s) Oral once PRN Blood Glucose LESS THAN 70 milliGRAM(s)/deciliter  diphenhydrAMINE Elixir 50 milliGRAM(s) Oral once PRN Rash and/or Itching  droxidopa 200 milliGRAM(s) Oral <User Schedule> PRN Prior to hemodialysis  sodium chloride 0.9% Bolus. 250 milliLiter(s) IV Bolus every 5 minutes PRN SBP LESS THAN or EQUAL to 90 mmHg  sodium chloride 0.9% lock flush 10 milliLiter(s) IV Push every 1 hour PRN Pre/post blood products, medications, blood draw, and to maintain line patency      ALLERGIES:  Allergies    isoniazid (Rash)  nafcillin (Unknown)  hydrALAZINE (Rash)  vitamin E (Short breath; Urticaria; Hives)  doxycycline (Rash)  cefepime (Rash)  NIFEdipine (Urticaria; Hives)  Zosyn (Rash)    Intolerances        LABS:                        8.0    15.30 )-----------( 434      ( 01 Nov 2023 01:30 )             26.8     11-01    137  |  102  |  26<H>  ----------------------------<  161<H>  4.6   |  27  |  1.20    Ca    8.9      01 Nov 2023 01:30  Phos  4.9     11-01  Mg     2.30     11-01    TPro  6.3  /  Alb  2.1<L>  /  TBili  <0.2  /  DBili  x   /  AST  16  /  ALT  9   /  AlkPhos  232<H>  11-01    PTT - ( 01 Nov 2023 01:30 )  PTT:33.7 sec  Urinalysis Basic - ( 01 Nov 2023 01:30 )    Color: x / Appearance: x / SG: x / pH: x  Gluc: 161 mg/dL / Ketone: x  / Bili: x / Urobili: x   Blood: x / Protein: x / Nitrite: x   Leuk Esterase: x / RBC: x / WBC x   Sq Epi: x / Non Sq Epi: x / Bacteria: x        RADIOLOGY & ADDITIONAL TESTS: Reviewed. MICU Progress Note    INTERVAL HPI/OVERNIGHT EVENTS: 22 beats Vtach overnight. Lytes wnl.     SUBJECTIVE: Patient seen and examined at bedside. Patient's son requesting wound care for L posterior leg ulcer.     ROS: unable to assess    OBJECTIVE:    VITAL SIGNS:  ICU Vital Signs Last 24 Hrs  T(C): 37.1 (01 Nov 2023 04:00), Max: 37.4 (31 Oct 2023 08:00)  T(F): 98.7 (01 Nov 2023 04:00), Max: 99.4 (31 Oct 2023 08:00)  HR: 113 (01 Nov 2023 07:00) (102 - 125)  BP: 105/52 (31 Oct 2023 08:00) (105/52 - 105/52)  BP(mean): 69 (31 Oct 2023 08:00) (69 - 69)  ABP: 154/65 (01 Nov 2023 07:00) (118/52 - 156/69)  ABP(mean): 102 (01 Nov 2023 07:00) (74 - 107)  RR: 23 (01 Nov 2023 07:00) (16 - 30)  SpO2: 100% (01 Nov 2023 07:00) (94% - 100%)    O2 Parameters below as of 01 Nov 2023 06:00  Patient On (Oxygen Delivery Method): ventilator    O2 Concentration (%): 50      Mode: AC/ CMV (Assist Control/ Continuous Mandatory Ventilation), RR (machine): 24, FiO2: 35, PEEP: 8, ITime: 0.8, MAP: 19, PC: 35, PIP: 44    10-31 @ 07:01  -  11-01 @ 07:00  --------------------------------------------------------  IN: 890 mL / OUT: 0 mL / NET: 890 mL      CAPILLARY BLOOD GLUCOSE      POCT Blood Glucose.: 156 mg/dL (01 Nov 2023 05:26)      PHYSICAL EXAM:  General: NAD  HEENT: NC/AT; PERRL, clear conjunctiva; L ear w/ grainy white residue, no bleeding appreciated  Neck: supple  Respiratory: CTA b/l  Cardiovascular: +S1/S2; RRR  Abdomen: soft, distended, warm to touch w/ area of erythema and induration RLQ  Extremities: WWP, 2+ peripheral pulses b/l; diffuse anasarca  Skin: normal color and turgor; no rash  Neurological: AO*0; rhythmic R facial and arm twitching    MEDICATIONS:  MEDICATIONS  (STANDING):  albuterol    0.083% 2.5 milliGRAM(s) Nebulizer every 6 hours  apixaban 5 milliGRAM(s) Oral every 12 hours  artificial tears (preservative free) Ophthalmic Solution 1 Drop(s) Both EYES every 4 hours  atorvastatin 10 milliGRAM(s) Oral at bedtime  chlorhexidine 0.12% Liquid 15 milliLiter(s) Oral Mucosa every 12 hours  chlorhexidine 2% Cloths 1 Application(s) Topical daily  ciprofloxacin/hydrocortisone Suspension Otic 4 Drop(s) Left Ear two times a day  dextrose 5%. 1000 milliLiter(s) (50 mL/Hr) IV Continuous <Continuous>  dextrose 5%. 1000 milliLiter(s) (100 mL/Hr) IV Continuous <Continuous>  dextrose 50% Injectable 12.5 Gram(s) IV Push once  dextrose 50% Injectable 25 Gram(s) IV Push once  dextrose 50% Injectable 25 Gram(s) IV Push once  dextrose 50% Injectable 25 Gram(s) IV Push once  droxidopa 600 milliGRAM(s) Oral every 8 hours  epoetin odalis (EPOGEN) Injectable 72086 Unit(s) IV Push <User Schedule>  ferrous    sulfate Liquid 300 milliGRAM(s) Oral daily  glucagon  Injectable 1 milliGRAM(s) IntraMuscular once  insulin lispro (ADMELOG) corrective regimen sliding scale   SubCutaneous every 6 hours  insulin NPH human recombinant 6 Unit(s) SubCutaneous every 6 hours  levETIRAcetam  Solution 1000 milliGRAM(s) Oral daily  levETIRAcetam  Solution 250 milliGRAM(s) Oral <User Schedule>  midodrine. 40 milliGRAM(s) Oral <User Schedule>  norepinephrine Infusion 0.05 MICROgram(s)/kG/Min (6.23 mL/Hr) IV Continuous <Continuous>  pantoprazole  Injectable 40 milliGRAM(s) IV Push two times a day  petrolatum Ophthalmic Ointment 1 Application(s) Both EYES four times a day  sodium chloride 1 Gram(s) Oral two times a day    MEDICATIONS  (PRN):  acetaminophen   Oral Liquid .. 650 milliGRAM(s) Oral every 6 hours PRN Temp greater or equal to 38C (100.4F), Mild Pain (1 - 3)  dextrose Oral Gel 15 Gram(s) Oral once PRN Blood Glucose LESS THAN 70 milliGRAM(s)/deciliter  diphenhydrAMINE Elixir 50 milliGRAM(s) Oral once PRN Rash and/or Itching  droxidopa 200 milliGRAM(s) Oral <User Schedule> PRN Prior to hemodialysis  sodium chloride 0.9% Bolus. 250 milliLiter(s) IV Bolus every 5 minutes PRN SBP LESS THAN or EQUAL to 90 mmHg  sodium chloride 0.9% lock flush 10 milliLiter(s) IV Push every 1 hour PRN Pre/post blood products, medications, blood draw, and to maintain line patency      ALLERGIES:  Allergies    isoniazid (Rash)  nafcillin (Unknown)  hydrALAZINE (Rash)  vitamin E (Short breath; Urticaria; Hives)  doxycycline (Rash)  cefepime (Rash)  NIFEdipine (Urticaria; Hives)  Zosyn (Rash)    Intolerances        LABS:                        8.0    15.30 )-----------( 434      ( 01 Nov 2023 01:30 )             26.8     11-01    137  |  102  |  26<H>  ----------------------------<  161<H>  4.6   |  27  |  1.20    Ca    8.9      01 Nov 2023 01:30  Phos  4.9     11-01  Mg     2.30     11-01    TPro  6.3  /  Alb  2.1<L>  /  TBili  <0.2  /  DBili  x   /  AST  16  /  ALT  9   /  AlkPhos  232<H>  11-01    PTT - ( 01 Nov 2023 01:30 )  PTT:33.7 sec  Urinalysis Basic - ( 01 Nov 2023 01:30 )    Color: x / Appearance: x / SG: x / pH: x  Gluc: 161 mg/dL / Ketone: x  / Bili: x / Urobili: x   Blood: x / Protein: x / Nitrite: x   Leuk Esterase: x / RBC: x / WBC x   Sq Epi: x / Non Sq Epi: x / Bacteria: x        RADIOLOGY & ADDITIONAL TESTS: Reviewed.

## 2023-11-01 NOTE — PROGRESS NOTE ADULT - SUBJECTIVE AND OBJECTIVE BOX
Patient is a 73y old  Female who presents with a chief complaint of Respiratory distress (01 Nov 2023 16:41)      SUBJECTIVE / OVERNIGHT EVENTS: wbc rising prob 2/2 recurrent acute L O. externa, on drops. afebrile    MEDICATIONS  (STANDING):  albuterol    0.083% 2.5 milliGRAM(s) Nebulizer every 6 hours  apixaban 5 milliGRAM(s) Oral every 12 hours  artificial tears (preservative free) Ophthalmic Solution 1 Drop(s) Both EYES every 4 hours  atorvastatin 10 milliGRAM(s) Oral at bedtime  chlorhexidine 0.12% Liquid 15 milliLiter(s) Oral Mucosa every 12 hours  chlorhexidine 2% Cloths 1 Application(s) Topical daily  ciprofloxacin/hydrocortisone Suspension Otic 4 Drop(s) Left Ear two times a day  dextrose 5%. 1000 milliLiter(s) (50 mL/Hr) IV Continuous <Continuous>  dextrose 5%. 1000 milliLiter(s) (100 mL/Hr) IV Continuous <Continuous>  dextrose 50% Injectable 12.5 Gram(s) IV Push once  dextrose 50% Injectable 25 Gram(s) IV Push once  dextrose 50% Injectable 25 Gram(s) IV Push once  dextrose 50% Injectable 25 Gram(s) IV Push once  droxidopa 600 milliGRAM(s) Oral every 8 hours  epoetin odalis (EPOGEN) Injectable 88941 Unit(s) IV Push <User Schedule>  ferrous    sulfate Liquid 300 milliGRAM(s) Oral daily  glucagon  Injectable 1 milliGRAM(s) IntraMuscular once  insulin lispro (ADMELOG) corrective regimen sliding scale   SubCutaneous every 6 hours  insulin NPH human recombinant 6 Unit(s) SubCutaneous every 6 hours  levETIRAcetam  Solution 1000 milliGRAM(s) Oral daily  levETIRAcetam  Solution 250 milliGRAM(s) Oral <User Schedule>  midodrine. 40 milliGRAM(s) Oral <User Schedule>  norepinephrine Infusion 0.05 MICROgram(s)/kG/Min (6.23 mL/Hr) IV Continuous <Continuous>  pantoprazole  Injectable 40 milliGRAM(s) IV Push two times a day  petrolatum Ophthalmic Ointment 1 Application(s) Both EYES four times a day  sodium chloride 1 Gram(s) Oral two times a day    MEDICATIONS  (PRN):  acetaminophen   Oral Liquid .. 650 milliGRAM(s) Oral every 6 hours PRN Temp greater or equal to 38C (100.4F), Mild Pain (1 - 3)  dextrose Oral Gel 15 Gram(s) Oral once PRN Blood Glucose LESS THAN 70 milliGRAM(s)/deciliter  diphenhydrAMINE Elixir 50 milliGRAM(s) Oral once PRN Rash and/or Itching  droxidopa 200 milliGRAM(s) Oral <User Schedule> PRN Prior to hemodialysis  midodrine. 20 milliGRAM(s) Oral <User Schedule> PRN 1 Hour Pre HD  sodium chloride 0.9% Bolus. 250 milliLiter(s) IV Bolus every 5 minutes PRN SBP LESS THAN or EQUAL to 90 mmHg  sodium chloride 0.9% lock flush 10 milliLiter(s) IV Push every 1 hour PRN Pre/post blood products, medications, blood draw, and to maintain line patency      Vital Signs Last 24 Hrs  T(F): 99.2 (11-01-23 @ 08:00), Max: 99.2 (11-01-23 @ 08:00)  HR: 115 (11-01-23 @ 16:40) (101 - 125)  BP: --  RR: 24 (11-01-23 @ 16:40) (21 - 30)  SpO2: 100% (11-01-23 @ 16:40) (94% - 100%)  Telemetry:   CAPILLARY BLOOD GLUCOSE      POCT Blood Glucose.: 169 mg/dL (01 Nov 2023 11:50)  POCT Blood Glucose.: 156 mg/dL (01 Nov 2023 05:26)  POCT Blood Glucose.: 145 mg/dL (31 Oct 2023 23:31)  POCT Blood Glucose.: 158 mg/dL (31 Oct 2023 17:51)    I&O's Summary    31 Oct 2023 07:01  -  01 Nov 2023 07:00  --------------------------------------------------------  IN: 890 mL / OUT: 0 mL / NET: 890 mL    01 Nov 2023 07:01  -  01 Nov 2023 17:13  --------------------------------------------------------  IN: 320 mL / OUT: 0 mL / NET: 320 mL        PHYSICAL EXAM:  GENERAL: NAD, well-developed  HEAD:  Atraumatic, Normocephalic  EYES: EOMI, PERRLA, conjunctiva and sclera clear  NECK: Supple, No JVD  CHEST/LUNG: Clear to auscultation bilaterally; No wheeze  HEART: Regular rate and rhythm; No murmurs, rubs, or gallops  ABDOMEN: Soft, Nontender, Nondistended; Bowel sounds present  EXTREMITIES:  2+ Peripheral Pulses, No clubbing, cyanosis, or edema  PSYCH: AAOx3  NEUROLOGY: non-focal  SKIN: No rashes or lesions    LABS:                        8.0    15.30 )-----------( 434      ( 01 Nov 2023 01:30 )             26.8     11-01    137  |  102  |  26<H>  ----------------------------<  161<H>  4.6   |  27  |  1.20    Ca    8.9      01 Nov 2023 01:30  Phos  4.9     11-01  Mg     2.30     11-01    TPro  6.3  /  Alb  2.1<L>  /  TBili  <0.2  /  DBili  x   /  AST  16  /  ALT  9   /  AlkPhos  232<H>  11-01    PTT - ( 01 Nov 2023 01:30 )  PTT:33.7 sec      Urinalysis Basic - ( 01 Nov 2023 01:30 )    Color: x / Appearance: x / SG: x / pH: x  Gluc: 161 mg/dL / Ketone: x  / Bili: x / Urobili: x   Blood: x / Protein: x / Nitrite: x   Leuk Esterase: x / RBC: x / WBC x   Sq Epi: x / Non Sq Epi: x / Bacteria: x        RADIOLOGY & ADDITIONAL TESTS:    Imaging Personally Reviewed:    Consultant(s) Notes Reviewed:      Care Discussed with Consultants/Other Providers:

## 2023-11-01 NOTE — PROGRESS NOTE ADULT - ATTENDING COMMENTS
Patient is a 72 yo F (wheelchair bound prior to admission) w/ HFpEF, T2DM, CKD5, latent TB (s/p tx,) hx breast ca (2018, s/p mastectomy and adjuvant CT/RT), and hx prior CVA s/p trach/PEG (decannulated prior to admission, ILR in place) who was initially admitted on 8/11/23 after p/w with acute hypoxemic and hypercapnic respiratory failure thought to be 2/2 Aspiration PNA vs ADHF/flash pulmonary edema in setting of HTN emergency. Patient required intubation and mechanical ventilationtubation, and was admitted to MICU for lengthy period of time.     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     Hospital course also marked by recurrent infections, including MSSA bacteremia (s/p Vanc due to possible cefepime vs. cefazolin drug-induced rash) and MSSA/ESBL EColi in BAL. Also found to hve L otitis externa (9/5) s/p ENT debridement c/b concern for superimposed candida infection (s/p Meropenem, Fluconazole). Most recently w/ Proteus/Pseudomonas in sputum now s/p Aztreonam. Also further found to have L popliteal DVT.    Patient transferred back to MICU for trial of HD, possibly requiring IV vasopressors. A line placed over the weekend revealing inaccurate noninvasive BP measurements    #Shock  #Encephalopathy  #Multiple Strokes  #UGIB  #L popliteal DVT  #L fungal otitis externa  #L endophthalmitis  #Chronic respiratory failure  #Oropharyngeal dysphagia  #RUSS on CKD  #Pleural effusions    - c/w PO vasopressors to maintain MAP > 65 now with accurate BP with A line. Required IV Levo for iHD  - Dose prn Northera and Midodrine prior to iHD session  - c/w mechanical ventilatory support, trach care per MICU team   - HD as per renal (HD catheter reinserted 10/24 for HD), will need further discussion if continues to require IV vasopressors to tolerate  - c/w tube feeds  - c/w Eliquis, hb stable. Cont ASA  - ENT recs appreciated    Patient examined and case reviewed in detail on bedside rounds. Frequent bedside visits with therapy change today.  Prognosis guarded.

## 2023-11-01 NOTE — PROGRESS NOTE ADULT - ASSESSMENT
72 y/o F well known to me from my [facility] outpt practice. she was admitted at [facility] 7/12-7/22 w aspiration PNA, was treated w CEFEPIME, developed an allergic rash,  dCHF, + MAC on AFB culture, had been progressively getting more and more lethargic and dyspneic at home since DC.   In  am of 8/11/23  ptn presented with respiratory distress w hypoxia and hypercarbia requiring intubation 2/2 volume overload +/- Asp PNA      Neuro   not responsive  Baseline MS AOx3, aphasic   - h/o CVA , on aspirin and statin . resumed w feeding tube, ASA resumed 8/26  - eeg  2/2 tremors, no sz focus, right facial twitching, on KEPPRA  - less responsive in the past few days  - MRI 8/17:  new R cerebellar infarct, old left PCA/Occipital infarct. probably embolic in nature, did not tolerate full AC in the past, STEPHANIE is neg , no shunts observed  - ptn is poorly reactive, rpt scan done, no further CVA seen.     Cardiac   cardiology following  CHFpEF   TTE 7/2023 with EF 59%, with severe LVH and diastolic dysfunction   on Levo drip 2/2 hypotension   on midodrine 40 mg qid, droxidopa 600 tid, additional 200 mg prior to HD,     Pulmonary   Acute hypercapnic and hypoxemic respiratory failure   prolonged intubation, now  trache to  vent, has copious secretions      Renal   on HD via L IJ Shiley, tunneled catheter when clinically stable  HD MWF, midodrine assisted, PUF in between HD days, unable to remove proper volume 2/2 hypotension.   permacath when clinically stable      GI  NPO  on tube feeds  coffee ground emesis x 1 8/24, melena overnight 8/31, s/p 1UPRBC, EGD/Colonoscopy: erosive gastropathy, esophagisits, on colonoscopy old blood seen no active bleeding, poor prep  family to decide re PEG placement    Endocrine  on Insulin: NPH, Admelog    ID   complicated infectious hospital course  treated for MSSA bacteremia, aspiration PNA.  sputum + for pseudomonas, ptn was initially on zosyn but was allergic, then was switched to aztreonam   Aztreonam was switched to polymyxin for a possible allergy but ptn didnt have a reaction to aztreonam, she had a reaction to Zosyn.   spike temps again while off Abx, Aztreonam resumed 10/17, DCed 10/29      ID course complicated with multiple ABx allergies  also treated for Left O. 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 and she remains full code at present

## 2023-11-02 NOTE — PROGRESS NOTE ADULT - ASSESSMENT
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       NEUROLOGY  #AMS  Multiple etiologies including active infection vs CVA.   MR head performed w/ new infarct and old infarcts, EEG without seizure events but with high foci potential   - f/u spot EEG given facial twitching  - f/u repeat MRI --> per neuro, can pursue MRI after EEG to aid in neuroprognostication as patient is not waking up  - continue lipitor  - continue keppra given above  - aspirin held 10/11, likely in s/o GIB, Hgb stable not requiring pRBC since 10/14      CARDIOVASCULAR  # Septic vs vasoplegic shock   Etiology likely septic iso active infection. Possible component of vasoplegic, however no longer with active infection or on sedation. Off IV vasopressors.   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   - resolved    RESPIRATORY  # AHRF second to volume overload   - CKD vs HFpEF w/ hypercarbia 2/2 volume overload requiring intubation, trach, and HD initiation  - Continue on albuterol and chest PT  - Continue volume removal with HD      #tracheomalacia   - CT CHEST (9/8) with tracheomalacia, BL pleural effusions and continued consolidations     #mechanical ventilation   - PC 24/43/8/35     GI  # UGIB: last pRBC transfused 10/14  - Continue on PPI BID    # Dysphagia   - NGT-TF     RENAL  # ESRD  - HD MWF TIW with midodrine and droxidopa  - ICU for vasopressor assisted volume removal, cap to 1 pressor and not offering CRRT due to comorbidities and prognosis   - f/u w/ nephrology: will continue to offer patient 1-pressor assisted HD as agreed prior, as long as she can tolerate HD maxed on 1 pressor, she will be considered iHD candidate  - UF -2.5L attempted 10/30, reached 1.7L limited by hypotension and tachycardia to 130s    INFECTIOUS DISEASE  # 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 Coli sputum Cx, BAL w/ MSSA  - treated with abx   - eosinophilia workup     # Proteus and  PSA VAP/ Trachitis   - Overall difficulty with ABX tailoring given allergic rxns and resistant organisms  - dc aztreonam    # MAC   - outpatient treatment    HEME  # Anemia second to GIB vs renal disease   - received transfusions during stay  - management as per renal, PPI    VASCULAR   # LLE POP DVT   - on Eliquis    # HD access  - TRISHA TAYLOR (9/27 - 10/21)  - TRISHA taylor replaced for HD     ONC   # Hx of Breast CA   - Patient dx in 2018 and s/p BL mastectomy (radical on right) and chemo and RT.     ENDOCRINE  # IDDM2   - Continued on NPH with ISS, however noted with hypoglycemic episodes and NPH dc'ed.    - Finger sticks trending up off NPH.   - Continue on NPH 6U with moderate ISS.   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       NEUROLOGY  #AMS  Multiple etiologies including active infection vs CVA.   MR head performed w/ new infarct and old infarcts, EEG without seizure events but with high foci potential   - f/u spot EEG given facial twitching: negative for epileptiform activity (10/31)  - f/u repeat MRI --> per neuro, can pursue MRI after EEG to aid in neuroprognostication as patient is not waking up  - continue lipitor  - continue keppra given above  - aspirin held 10/11, likely in s/o GIB, Hgb stable not requiring pRBC since 10/14      CARDIOVASCULAR  # Septic vs vasoplegic shock   Etiology likely septic iso active infection. Possible component of vasoplegic, however no longer with active infection or on sedation. Off IV vasopressors.   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   - resolved    RESPIRATORY  # AHRF second to volume overload   - CKD vs HFpEF w/ hypercarbia 2/2 volume overload requiring intubation, trach, and HD initiation  - Continue on albuterol and chest PT  - Continue volume removal with HD      #tracheomalacia   - CT CHEST (9/8) with tracheomalacia, BL pleural effusions and continued consolidations     #mechanical ventilation   - PC 24/43/8/35     GI  # UGIB: last pRBC transfused 10/14  - Continue on PPI BID    # Dysphagia   - NGT-TF     RENAL  # ESRD  - HD MWF TIW with midodrine and droxidopa  - ICU for vasopressor assisted volume removal, cap to 1 pressor and not offering CRRT due to comorbidities and prognosis   - f/u w/ nephrology: will continue to offer patient 1-pressor assisted HD as agreed prior, as long as she can tolerate HD maxed on 1 pressor, she will be considered iHD candidate  - UF -2.5L attempted 10/30, reached 1.7L limited by hypotension and tachycardia to 130s    INFECTIOUS DISEASE  # 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 Coli sputum Cx, BAL w/ MSSA  - treated with abx   - eosinophilia workup     # Proteus and  PSA VAP/ Trachitis   - Overall difficulty with ABX tailoring given allergic rxns and resistant organisms  - dc aztreonam    # MAC   - outpatient treatment    HEME  # Anemia second to GIB vs renal disease   - received transfusions during stay  - management as per renal, PPI    VASCULAR   # LLE POP DVT   - on Eliquis    # HD access  - TRISHA TAYL[facility] (9/27 - 10/21)  - TRISHA taylor replaced for HD     ONC   # Hx of Breast CA   - Patient dx in 2018 and s/p BL mastectomy (radical on right) and chemo and RT.     ENDOCRINE  # IDDM2   - Continued on NPH with ISS, however noted with hypoglycemic episodes and NPH dc'ed.    - Finger sticks trending up off NPH.   - Continue on NPH 6U with moderate ISS.   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       NEUROLOGY  #AMS  Multiple etiologies including active infection vs CVA.   MR head performed w/ new infarct and old infarcts, EEG without seizure events but with high foci potential   - f/u spot EEG given facial twitching: negative for epileptiform activity (10/31)  - f/u repeat MRI --> per neuro, can pursue MRI after EEG to aid in neuroprognostication as patient is not waking up  - continue lipitor  - continue keppra given above  - aspirin held 10/11, likely in s/o GIB, Hgb stable not requiring pRBC since 10/14      CARDIOVASCULAR  # Septic vs vasoplegic shock   Etiology likely septic iso active infection. Possible component of vasoplegic, however no longer with active infection or on sedation. Off IV vasopressors.   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   - resolved    RESPIRATORY  # AHRF second to volume overload   - CKD vs HFpEF w/ hypercarbia 2/2 volume overload requiring intubation, trach, and HD initiation  - Continue on albuterol and chest PT  - Continue volume removal with HD      #tracheomalacia   - CT CHEST (9/8) with tracheomalacia, BL pleural effusions and continued consolidations     GI  # UGIB: last pRBC transfused 10/14  - Continue on PPI BID    # Dysphagia   - NGT-TF     RENAL  # ESRD  - HD MWF TIW with midodrine and droxidopa prior  - ICU for vasopressor assisted volume removal, cap to 1 pressor and not offering CRRT due to comorbidities and prognosis   - f/u w/ nephrology: will continue to offer patient 1-pressor assisted HD as agreed prior, as long as she can tolerate HD maxed on 1 pressor, she will be considered iHD candidate  - UF -2.5L attempted 10/30, reached 1.7L limited by hypotension and tachycardia to 130s, able to remove 2L 11/1    INFECTIOUS DISEASE  #Sepsis: temp 101.2 on 11/2; sources include skin (poorly demarcated area of induration/erythema, enlarging on RLQ), pulm (copious sputum)  - Bcx, sputum cx sent  - ID re-consulted  - Abx:      --> Vanc (11/2 - ), level pre-HD     --> Aztreo 2g     # 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 Coli sputum Cx, BAL w/ MSSA  - treated with abx   - eosinophilia workup     # Proteus and  PSA VAP/ Trachitis   - Overall difficulty with ABX tailoring given allergic rxns and resistant organisms  - dc aztreonam    # MAC   - outpatient treatment    HEME  # Anemia second to GIB vs renal disease   - received transfusions during stay  - management as per renal, PPI    VASCULAR   # LLE POP DVT   - on Eliquis    # HD access  - TRISHA TAYL[facility] (9/27 - 10/21)  - TRISHA taylor replaced for HD     ONC   # Hx of Breast CA   - Patient dx in 2018 and s/p BL mastectomy (radical on right) and chemo and RT.     ENDOCRINE  # IDDM2   - Continued on NPH with ISS, however noted with hypoglycemic episodes and NPH dc'ed.    - Finger sticks trending up off NPH.   - Continue on NPH 6U with moderate ISS.   - Adjust as need     SKIN  # Drug Eruption   - treated    ETHICS/ GOC    - Attempted palliative discussion however family not interested and wishes for FULL CODE  - Family continues to want everything done despite inability to tolerate HD on multiple oral pressor

## 2023-11-02 NOTE — PROGRESS NOTE ADULT - ASSESSMENT

## 2023-11-02 NOTE — PROGRESS NOTE ADULT - SUBJECTIVE AND OBJECTIVE BOX
[facility] [phone]        Patient seen and examined she had HD yesterday 2L removed, levophed resumed for HD. Febrile Tmax 104.1.         MEDICATIONS  (STANDING):  albuterol    0.083% 2.5 milliGRAM(s) Nebulizer every 6 hours  apixaban 5 milliGRAM(s) Oral every 12 hours  artificial tears (preservative free) Ophthalmic Solution 1 Drop(s) Both EYES every 4 hours  atorvastatin 10 milliGRAM(s) Oral at bedtime  aztreonam  IVPB 2000 milliGRAM(s) IV Intermittent <User Schedule>  chlorhexidine 0.12% Liquid 15 milliLiter(s) Oral Mucosa every 12 hours  chlorhexidine 2% Cloths 1 Application(s) Topical daily  ciprofloxacin/hydrocortisone Suspension Otic 4 Drop(s) Left Ear two times a day  dextrose 5%. 1000 milliLiter(s) (50 mL/Hr) IV Continuous <Continuous>  dextrose 5%. 1000 milliLiter(s) (100 mL/Hr) IV Continuous <Continuous>  dextrose 50% Injectable 12.5 Gram(s) IV Push once  dextrose 50% Injectable 25 Gram(s) IV Push once  dextrose 50% Injectable 25 Gram(s) IV Push once  dextrose 50% Injectable 25 Gram(s) IV Push once  droxidopa 600 milliGRAM(s) Oral every 8 hours  epoetin odalis (EPOGEN) Injectable 93282 Unit(s) IV Push <User Schedule>  ferrous    sulfate Liquid 300 milliGRAM(s) Oral daily  glucagon  Injectable 1 milliGRAM(s) IntraMuscular once  insulin lispro (ADMELOG) corrective regimen sliding scale   SubCutaneous every 6 hours  insulin NPH human recombinant 6 Unit(s) SubCutaneous every 6 hours  levETIRAcetam  Solution 1000 milliGRAM(s) Oral daily  levETIRAcetam  Solution 250 milliGRAM(s) Oral <User Schedule>  midodrine. 40 milliGRAM(s) Oral <User Schedule>  norepinephrine Infusion 0.05 MICROgram(s)/kG/Min (6.23 mL/Hr) IV Continuous <Continuous>  pantoprazole  Injectable 40 milliGRAM(s) IV Push two times a day  petrolatum Ophthalmic Ointment 1 Application(s) Both EYES four times a day  sodium chloride 1 Gram(s) Oral two times a day      VITAL:  T(C): , Max: 40.1 (11-02-23 @ 12:00)  T(F): , Max: 104.1 (11-02-23 @ 12:00)  HR: 115 (11-02-23 @ 13:00)  BP: --  BP(mean): --  RR: 24 (11-02-23 @ 13:00)  SpO2: 100% (11-02-23 @ 13:00)  Wt(kg): --    I and O's:    11-01 @ 07:01  -  11-02 @ 07:00  --------------------------------------------------------  IN: 1060 mL / OUT: 2400 mL / NET: -1340 mL    11-02 @ 07:01  -  11-02 @ 13:05  --------------------------------------------------------  IN: 710 mL / OUT: 0 mL / NET: 710 mL          PHYSICAL EXAM:  Constitutional: critically ill, trach to vent   HEENT: + NGT, + tracheostomy   Respiratory: coarse BS  Cardiovascular: tachy   Gastrointestinal: BS+, soft, NT/ND  Extremities: ++ peripheral edema  Neurological: UTO  : No Wheeler  Skin: No rashes  Access: [facility]       LABS:                        7.2    13.89 )-----------( 422      ( 02 Nov 2023 00:15 )             24.3     11-02    132<L>  |  98  |  21  ----------------------------<  196<H>  4.1   |  26  |  0.97    Ca    8.5      02 Nov 2023 00:15  Phos  3.8     11-02  Mg     2.20     11-02    TPro  6.1  /  Alb  2.0<L>  /  TBili  0.2  /  DBili  x   /  AST  17  /  ALT  9   /  AlkPhos  235<H>  11-02          Urine Studies:  Urinalysis Basic - ( 02 Nov 2023 00:15 )    Color: x / Appearance: x / SG: x / pH: x  Gluc: 196 mg/dL / Ketone: x  / Bili: x / Urobili: x   Blood: x / Protein: x / Nitrite: x   Leuk Esterase: x / RBC: x / WBC x   Sq Epi: x / Non Sq Epi: x / Bacteria: x        IMPRESSION: 73F w/ HTN, DM2, CVA, breast CA-bilateral mastectomy, recurrent aspiration pneumonia/respiratory failure, and CKD, 8/11/23 p/w acute hypercapnic respiratory failure; c/b RUSS    (1)Renal - RUSS on CKD4 ==>now newly ESRD. Last dialyzed Wednesday     (2)Hyponatremia - NA+ declining     (3)CV- tenuous hemodynamics such with each passing week we have more difficulty performing HD/achieving UF, persistent issue. BP stable with Jacki, though continues to need pressors for HD    (4)ID- BCx from 10/14/23 NGTD, BC (10/17) NGTD on IV abx     (5)Pulm- trach/vent-dependent    (6)Anemia- hgb low, epo with HD    RECOMMEND:  (1)HD tomorrow 2 kg UF; pressors and sodium modelling as needed to keep SBP>90; Epogen with HD   (2)Dose new meds for GFR <10/HD.     Tere Arauz NP  [facility]  [phone]      [facility] [phone]        Patient seen and examined she had HD yesterday 2L removed, levophed resumed for HD. Febrile Tmax 104.1.         MEDICATIONS  (STANDING):  albuterol    0.083% 2.5 milliGRAM(s) Nebulizer every 6 hours  apixaban 5 milliGRAM(s) Oral every 12 hours  artificial tears (preservative free) Ophthalmic Solution 1 Drop(s) Both EYES every 4 hours  atorvastatin 10 milliGRAM(s) Oral at bedtime  aztreonam  IVPB 2000 milliGRAM(s) IV Intermittent <User Schedule>  chlorhexidine 0.12% Liquid 15 milliLiter(s) Oral Mucosa every 12 hours  chlorhexidine 2% Cloths 1 Application(s) Topical daily  ciprofloxacin/hydrocortisone Suspension Otic 4 Drop(s) Left Ear two times a day  dextrose 5%. 1000 milliLiter(s) (50 mL/Hr) IV Continuous <Continuous>  dextrose 5%. 1000 milliLiter(s) (100 mL/Hr) IV Continuous <Continuous>  dextrose 50% Injectable 12.5 Gram(s) IV Push once  dextrose 50% Injectable 25 Gram(s) IV Push once  dextrose 50% Injectable 25 Gram(s) IV Push once  dextrose 50% Injectable 25 Gram(s) IV Push once  droxidopa 600 milliGRAM(s) Oral every 8 hours  epoetin odalis (EPOGEN) Injectable 05185 Unit(s) IV Push <User Schedule>  ferrous    sulfate Liquid 300 milliGRAM(s) Oral daily  glucagon  Injectable 1 milliGRAM(s) IntraMuscular once  insulin lispro (ADMELOG) corrective regimen sliding scale   SubCutaneous every 6 hours  insulin NPH human recombinant 6 Unit(s) SubCutaneous every 6 hours  levETIRAcetam  Solution 1000 milliGRAM(s) Oral daily  levETIRAcetam  Solution 250 milliGRAM(s) Oral <User Schedule>  midodrine. 40 milliGRAM(s) Oral <User Schedule>  norepinephrine Infusion 0.05 MICROgram(s)/kG/Min (6.23 mL/Hr) IV Continuous <Continuous>  pantoprazole  Injectable 40 milliGRAM(s) IV Push two times a day  petrolatum Ophthalmic Ointment 1 Application(s) Both EYES four times a day  sodium chloride 1 Gram(s) Oral two times a day      VITAL:  T(C): , Max: 40.1 (11-02-23 @ 12:00)  T(F): , Max: 104.1 (11-02-23 @ 12:00)  HR: 115 (11-02-23 @ 13:00)  BP: --  BP(mean): --  RR: 24 (11-02-23 @ 13:00)  SpO2: 100% (11-02-23 @ 13:00)  Wt(kg): --    I and O's:    11-01 @ 07:01  -  11-02 @ 07:00  --------------------------------------------------------  IN: 1060 mL / OUT: 2400 mL / NET: -1340 mL    11-02 @ 07:01  -  11-02 @ 13:05  --------------------------------------------------------  IN: 710 mL / OUT: 0 mL / NET: 710 mL          PHYSICAL EXAM:  Constitutional: critically ill, trach to vent   HEENT: + NGT, + tracheostomy   Respiratory: coarse BS  Cardiovascular: tachy   Gastrointestinal: BS+, soft, NT/ND  Extremities: ++ peripheral edema  Neurological: UTO  : No Wheeler  Skin: No rashes  Access: [facility]       LABS:                        7.2    13.89 )-----------( 422      ( 02 Nov 2023 00:15 )             24.3     11-02    132<L>  |  98  |  21  ----------------------------<  196<H>  4.1   |  26  |  0.97    Ca    8.5      02 Nov 2023 00:15  Phos  3.8     11-02  Mg     2.20     11-02    TPro  6.1  /  Alb  2.0<L>  /  TBili  0.2  /  DBili  x   /  AST  17  /  ALT  9   /  AlkPhos  235<H>  11-02          Urine Studies:  Urinalysis Basic - ( 02 Nov 2023 00:15 )    Color: x / Appearance: x / SG: x / pH: x  Gluc: 196 mg/dL / Ketone: x  / Bili: x / Urobili: x   Blood: x / Protein: x / Nitrite: x   Leuk Esterase: x / RBC: x / WBC x   Sq Epi: x / Non Sq Epi: x / Bacteria: x        IMPRESSION: 73F w/ HTN, DM2, CVA, breast CA-bilateral mastectomy, recurrent aspiration pneumonia/respiratory failure, and CKD, 8/11/23 p/w acute hypercapnic respiratory failure; c/b RUSS    (1)Renal - RUSS on CKD4 ==>now newly ESRD. Last dialyzed Wednesday     (2)Hyponatremia - NA+ declining     (3)CV- tenuous hemodynamics such with each passing week we have more difficulty performing HD/achieving UF, persistent issue. BP stable with Jacki, though continues to need pressors for HD    (4)ID- BCx from 10/14/23 NGTD, BC (10/17) NGTD on IV abx     (5)Pulm- trach/vent-dependent    (6)Anemia- hgb low, epo with HD    RECOMMEND:  (1)HD tomorrow 2 kg UF; pressors and sodium modelling as needed to keep SBP>90; Epogen with HD   (2)Dose new meds for GFR <10/HD.     Tere Arauz NP  [facility]  [phone]       RENAL ATTENDING NOTE  Patient seen and examined with NP. Agree with assessment and plan as above.      Jimmy Colon MD  [facility]  [phone]

## 2023-11-02 NOTE — PROGRESS NOTE ADULT - ASSESSMENT
73 f with DM, HTN, CKD, breast CA, latent TB (treated first with INH then had rash and switched to rifampin), MSSA bacteremia, c-diff, respiratory arrest and cardiac arrest (2018), s/p trach/PEG then removed, CVA with residual weakness, aspiration PNA, 1/4 sputums had MAC 7/2023, admitted 8/11 with respiratory failure no fever or WBC but was intubated and then spiked  blood cx negative, sputum cx with MSSA  developed rash and renal failure after cefepime, was on HD then discontinued became uremic and now again on HD  head MRI 8/17 with acute cerebellar infarct  had renal failure, AIN? family declined renal biopsy started on prednisone  s/p trach 9/1 and BAL with MSSA   ET cx with ESBL and MSSA  blood cx 9/1: MSSA with hypotension  repeat negative 9/4, 9/8, TTE no veg s/p 4 weeks of vanco/dapto through 10/2  L ear edema and otitis externa, s/p a long course of ana cristina, the last cx showed candida and ENT stated it could be fungal (saw black spores?) so was also given 7 days of fluconazole  pt had a rash again, unclear what caused it, fluconazole  still with intermittent fevers and then sputum cx with carbapenem resistant pseudomonas, s/p a course of polymixin  no improvement in mental status and ?seizure as well, neuro following  had drop in Hgb s/p transfusion and then had again febrile 10/15-10.17 with leukocytosis, rash and eosinophilia, sputum growing pseudomonas and proteus  (both sensitive to aztreonam) ?VAP vs a transfusion reaction  hypotension during HD,  transferred to MICU, now off pressors, shiley was removed in view of intermittent fever, now plan for new shiley, c-diff and GI PCR negative  pt again febrile to 104.1 on 11/2, WBC 13 and the L ear again with otitis externa     * agree with vanco and aztreonam for now  * blood, sputum and urine cx  * if remains febrile or worsening status will need chest/abd/pelvis Ct  * monitor CBC/diff and CMP          The above assessment and plan was discussed with MICU    Yumiko Conner MD  contact on teams  After 5pm and on weekends call [phone]

## 2023-11-02 NOTE — PROGRESS NOTE ADULT - SUBJECTIVE AND OBJECTIVE BOX
MICU Progress Note    INTERVAL HPI/OVERNIGHT EVENTS:    SUBJECTIVE: Patient seen and examined at bedside.     CONSTITUTIONAL: No weakness, fevers or chills  EYES/ENT: No visual changes;  No vertigo or throat pain   NECK: No pain or stiffness  RESPIRATORY: No cough, wheezing, hemoptysis; No shortness of breath  CARDIOVASCULAR: No chest pain or palpitations  GASTROINTESTINAL: No abdominal or epigastric pain. No nausea, vomiting, or hematemesis; No diarrhea or constipation. No melena or hematochezia.  GENITOURINARY: No dysuria, frequency or hematuria  NEUROLOGICAL: No numbness or weakness  SKIN: No itching, rashes    OBJECTIVE:    VITAL SIGNS:  ICU Vital Signs Last 24 Hrs  T(C): 37.9 (02 Nov 2023 04:00), Max: 37.9 (01 Nov 2023 20:00)  T(F): 100.2 (02 Nov 2023 04:00), Max: 100.3 (01 Nov 2023 20:00)  HR: 107 (02 Nov 2023 06:00) (101 - 134)  BP: --  BP(mean): --  ABP: 142/54 (02 Nov 2023 06:00) (101/37 - 153/55)  ABP(mean): 91 (02 Nov 2023 06:00) (63 - 96)  RR: 24 (02 Nov 2023 06:00) (19 - 32)  SpO2: 100% (02 Nov 2023 06:00) (98% - 100%)    O2 Parameters below as of 02 Nov 2023 06:00  Patient On (Oxygen Delivery Method): ventilator    O2 Concentration (%): 50      Mode: AC/ CMV (Assist Control/ Continuous Mandatory Ventilation), RR (machine): 24, FiO2: 50, PEEP: 8, ITime: 0.8, MAP: 20, PC: 35, PIP: 43    11-01 @ 07:01  -  11-02 @ 07:00  --------------------------------------------------------  IN: 1020 mL / OUT: 2400 mL / NET: -1380 mL      CAPILLARY BLOOD GLUCOSE      POCT Blood Glucose.: 197 mg/dL (02 Nov 2023 06:14)      PHYSICAL EXAM:    General: NAD  HEENT: NC/AT; PERRL, clear conjunctiva  Neck: supple  Respiratory: CTA b/l  Cardiovascular: +S1/S2; RRR  Abdomen: soft, NT/ND; +BS x4  Extremities: WWP, 2+ peripheral pulses b/l; no LE edema  Skin: normal color and turgor; no rash  Neurological:    MEDICATIONS:  MEDICATIONS  (STANDING):  albuterol    0.083% 2.5 milliGRAM(s) Nebulizer every 6 hours  apixaban 5 milliGRAM(s) Oral every 12 hours  artificial tears (preservative free) Ophthalmic Solution 1 Drop(s) Both EYES every 4 hours  atorvastatin 10 milliGRAM(s) Oral at bedtime  chlorhexidine 0.12% Liquid 15 milliLiter(s) Oral Mucosa every 12 hours  chlorhexidine 2% Cloths 1 Application(s) Topical daily  ciprofloxacin/hydrocortisone Suspension Otic 4 Drop(s) Left Ear two times a day  dextrose 5%. 1000 milliLiter(s) (50 mL/Hr) IV Continuous <Continuous>  dextrose 5%. 1000 milliLiter(s) (100 mL/Hr) IV Continuous <Continuous>  dextrose 50% Injectable 12.5 Gram(s) IV Push once  dextrose 50% Injectable 25 Gram(s) IV Push once  dextrose 50% Injectable 25 Gram(s) IV Push once  dextrose 50% Injectable 25 Gram(s) IV Push once  droxidopa 600 milliGRAM(s) Oral every 8 hours  epoetin odalis (EPOGEN) Injectable 35996 Unit(s) IV Push <User Schedule>  ferrous    sulfate Liquid 300 milliGRAM(s) Oral daily  glucagon  Injectable 1 milliGRAM(s) IntraMuscular once  insulin lispro (ADMELOG) corrective regimen sliding scale   SubCutaneous every 6 hours  insulin NPH human recombinant 6 Unit(s) SubCutaneous every 6 hours  levETIRAcetam  Solution 1000 milliGRAM(s) Oral daily  levETIRAcetam  Solution 250 milliGRAM(s) Oral <User Schedule>  midodrine. 40 milliGRAM(s) Oral <User Schedule>  norepinephrine Infusion 0.05 MICROgram(s)/kG/Min (6.23 mL/Hr) IV Continuous <Continuous>  pantoprazole  Injectable 40 milliGRAM(s) IV Push two times a day  petrolatum Ophthalmic Ointment 1 Application(s) Both EYES four times a day  sodium chloride 1 Gram(s) Oral two times a day    MEDICATIONS  (PRN):  acetaminophen   Oral Liquid .. 650 milliGRAM(s) Oral every 6 hours PRN Temp greater or equal to 38C (100.4F), Mild Pain (1 - 3)  dextrose Oral Gel 15 Gram(s) Oral once PRN Blood Glucose LESS THAN 70 milliGRAM(s)/deciliter  diphenhydrAMINE Elixir 50 milliGRAM(s) Oral once PRN Rash and/or Itching  droxidopa 200 milliGRAM(s) Oral <User Schedule> PRN Prior to hemodialysis  midodrine. 20 milliGRAM(s) Oral <User Schedule> PRN 1 Hour Pre HD  sodium chloride 0.9% Bolus. 250 milliLiter(s) IV Bolus every 5 minutes PRN SBP LESS THAN or EQUAL to 90 mmHg  sodium chloride 0.9% lock flush 10 milliLiter(s) IV Push every 1 hour PRN Pre/post blood products, medications, blood draw, and to maintain line patency      ALLERGIES:  Allergies    isoniazid (Rash)  nafcillin (Unknown)  hydrALAZINE (Rash)  vitamin E (Short breath; Urticaria; Hives)  doxycycline (Rash)  cefepime (Rash)  NIFEdipine (Urticaria; Hives)  Zosyn (Rash)    Intolerances        LABS:                        7.2    13.89 )-----------( 422      ( 02 Nov 2023 00:15 )             24.3     11-02    132<L>  |  98  |  21  ----------------------------<  196<H>  4.1   |  26  |  0.97    Ca    8.5      02 Nov 2023 00:15  Phos  3.8     11-02  Mg     2.20     11-02    TPro  6.1  /  Alb  2.0<L>  /  TBili  0.2  /  DBili  x   /  AST  17  /  ALT  9   /  AlkPhos  235<H>  11-02    PTT - ( 02 Nov 2023 00:15 )  PTT:33.8 sec  Urinalysis Basic - ( 02 Nov 2023 00:15 )    Color: x / Appearance: x / SG: x / pH: x  Gluc: 196 mg/dL / Ketone: x  / Bili: x / Urobili: x   Blood: x / Protein: x / Nitrite: x   Leuk Esterase: x / RBC: x / WBC x   Sq Epi: x / Non Sq Epi: x / Bacteria: x        RADIOLOGY & ADDITIONAL TESTS: Reviewed. MICU Progress Note    INTERVAL HPI/OVERNIGHT EVENTS: Completed HD w/ 2L UF on max of 0.15 Levo, able to be weaned by last evening. This AM febrile to 101.2, cx resent, restarted abx, given tylenol.     SUBJECTIVE: Patient seen and examined at bedside.     ROS: unable to assess    OBJECTIVE:    VITAL SIGNS:  ICU Vital Signs Last 24 Hrs  T(C): 37.9 (02 Nov 2023 04:00), Max: 37.9 (01 Nov 2023 20:00)  T(F): 100.2 (02 Nov 2023 04:00), Max: 100.3 (01 Nov 2023 20:00)  HR: 107 (02 Nov 2023 06:00) (101 - 134)  BP: --  BP(mean): --  ABP: 142/54 (02 Nov 2023 06:00) (101/37 - 153/55)  ABP(mean): 91 (02 Nov 2023 06:00) (63 - 96)  RR: 24 (02 Nov 2023 06:00) (19 - 32)  SpO2: 100% (02 Nov 2023 06:00) (98% - 100%)    O2 Parameters below as of 02 Nov 2023 06:00  Patient On (Oxygen Delivery Method): ventilator    O2 Concentration (%): 50      Mode: AC/ CMV (Assist Control/ Continuous Mandatory Ventilation), RR (machine): 24, FiO2: 50, PEEP: 8, ITime: 0.8, MAP: 20, PC: 35, PIP: 43    11-01 @ 07:01  -  11-02 @ 07:00  --------------------------------------------------------  IN: 1020 mL / OUT: 2400 mL / NET: -1380 mL      CAPILLARY BLOOD GLUCOSE      POCT Blood Glucose.: 197 mg/dL (02 Nov 2023 06:14)      PHYSICAL EXAM:  General: NAD  HEENT: NC/AT; PERRL, clear conjunctiva; L ear w/ grainy white residue, no bleeding appreciated  Neck: supple  Respiratory: CTA b/l  Cardiovascular: +S1/S2; RRR  Abdomen: soft, distended, warm to touch w/ area of erythema and induration RLQ  Extremities: WWP, 2+ peripheral pulses b/l; diffuse anasarca  Skin: normal color and turgor; no rash  Neurological: AO*0; rhythmic R facial and arm twitching      MEDICATIONS:  MEDICATIONS  (STANDING):  albuterol    0.083% 2.5 milliGRAM(s) Nebulizer every 6 hours  apixaban 5 milliGRAM(s) Oral every 12 hours  artificial tears (preservative free) Ophthalmic Solution 1 Drop(s) Both EYES every 4 hours  atorvastatin 10 milliGRAM(s) Oral at bedtime  chlorhexidine 0.12% Liquid 15 milliLiter(s) Oral Mucosa every 12 hours  chlorhexidine 2% Cloths 1 Application(s) Topical daily  ciprofloxacin/hydrocortisone Suspension Otic 4 Drop(s) Left Ear two times a day  dextrose 5%. 1000 milliLiter(s) (50 mL/Hr) IV Continuous <Continuous>  dextrose 5%. 1000 milliLiter(s) (100 mL/Hr) IV Continuous <Continuous>  dextrose 50% Injectable 12.5 Gram(s) IV Push once  dextrose 50% Injectable 25 Gram(s) IV Push once  dextrose 50% Injectable 25 Gram(s) IV Push once  dextrose 50% Injectable 25 Gram(s) IV Push once  droxidopa 600 milliGRAM(s) Oral every 8 hours  epoetin odalis (EPOGEN) Injectable 52935 Unit(s) IV Push <User Schedule>  ferrous    sulfate Liquid 300 milliGRAM(s) Oral daily  glucagon  Injectable 1 milliGRAM(s) IntraMuscular once  insulin lispro (ADMELOG) corrective regimen sliding scale   SubCutaneous every 6 hours  insulin NPH human recombinant 6 Unit(s) SubCutaneous every 6 hours  levETIRAcetam  Solution 1000 milliGRAM(s) Oral daily  levETIRAcetam  Solution 250 milliGRAM(s) Oral <User Schedule>  midodrine. 40 milliGRAM(s) Oral <User Schedule>  norepinephrine Infusion 0.05 MICROgram(s)/kG/Min (6.23 mL/Hr) IV Continuous <Continuous>  pantoprazole  Injectable 40 milliGRAM(s) IV Push two times a day  petrolatum Ophthalmic Ointment 1 Application(s) Both EYES four times a day  sodium chloride 1 Gram(s) Oral two times a day    MEDICATIONS  (PRN):  acetaminophen   Oral Liquid .. 650 milliGRAM(s) Oral every 6 hours PRN Temp greater or equal to 38C (100.4F), Mild Pain (1 - 3)  dextrose Oral Gel 15 Gram(s) Oral once PRN Blood Glucose LESS THAN 70 milliGRAM(s)/deciliter  diphenhydrAMINE Elixir 50 milliGRAM(s) Oral once PRN Rash and/or Itching  droxidopa 200 milliGRAM(s) Oral <User Schedule> PRN Prior to hemodialysis  midodrine. 20 milliGRAM(s) Oral <User Schedule> PRN 1 Hour Pre HD  sodium chloride 0.9% Bolus. 250 milliLiter(s) IV Bolus every 5 minutes PRN SBP LESS THAN or EQUAL to 90 mmHg  sodium chloride 0.9% lock flush 10 milliLiter(s) IV Push every 1 hour PRN Pre/post blood products, medications, blood draw, and to maintain line patency      ALLERGIES:  Allergies    isoniazid (Rash)  nafcillin (Unknown)  hydrALAZINE (Rash)  vitamin E (Short breath; Urticaria; Hives)  doxycycline (Rash)  cefepime (Rash)  NIFEdipine (Urticaria; Hives)  Zosyn (Rash)    Intolerances        LABS:                        7.2    13.89 )-----------( 422      ( 02 Nov 2023 00:15 )             24.3     11-02    132<L>  |  98  |  21  ----------------------------<  196<H>  4.1   |  26  |  0.97    Ca    8.5      02 Nov 2023 00:15  Phos  3.8     11-02  Mg     2.20     11-02    TPro  6.1  /  Alb  2.0<L>  /  TBili  0.2  /  DBili  x   /  AST  17  /  ALT  9   /  AlkPhos  235<H>  11-02    PTT - ( 02 Nov 2023 00:15 )  PTT:33.8 sec  Urinalysis Basic - ( 02 Nov 2023 00:15 )    Color: x / Appearance: x / SG: x / pH: x  Gluc: 196 mg/dL / Ketone: x  / Bili: x / Urobili: x   Blood: x / Protein: x / Nitrite: x   Leuk Esterase: x / RBC: x / WBC x   Sq Epi: x / Non Sq Epi: x / Bacteria: x        RADIOLOGY & ADDITIONAL TESTS: Reviewed.

## 2023-11-02 NOTE — PROGRESS NOTE ADULT - SUBJECTIVE AND OBJECTIVE BOX
Subjective: Patient seen and examined. No new events except as noted.   remains in ICU    febrile to 101.2    REVIEW OF SYSTEMS:  Unable to obtain    MEDICATIONS:  MEDICATIONS  (STANDING):  albuterol    0.083% 2.5 milliGRAM(s) Nebulizer every 6 hours  apixaban 5 milliGRAM(s) Oral every 12 hours  artificial tears (preservative free) Ophthalmic Solution 1 Drop(s) Both EYES every 4 hours  atorvastatin 10 milliGRAM(s) Oral at bedtime  aztreonam  IVPB 2000 milliGRAM(s) IV Intermittent <User Schedule>  chlorhexidine 0.12% Liquid 15 milliLiter(s) Oral Mucosa every 12 hours  chlorhexidine 2% Cloths 1 Application(s) Topical daily  ciprofloxacin/hydrocortisone Suspension Otic 4 Drop(s) Left Ear two times a day  dextrose 5%. 1000 milliLiter(s) (50 mL/Hr) IV Continuous <Continuous>  dextrose 5%. 1000 milliLiter(s) (100 mL/Hr) IV Continuous <Continuous>  dextrose 50% Injectable 12.5 Gram(s) IV Push once  dextrose 50% Injectable 25 Gram(s) IV Push once  dextrose 50% Injectable 25 Gram(s) IV Push once  dextrose 50% Injectable 25 Gram(s) IV Push once  droxidopa 600 milliGRAM(s) Oral every 8 hours  epoetin odalis (EPOGEN) Injectable 56646 Unit(s) IV Push <User Schedule>  ferrous    sulfate Liquid 300 milliGRAM(s) Oral daily  glucagon  Injectable 1 milliGRAM(s) IntraMuscular once  insulin lispro (ADMELOG) corrective regimen sliding scale   SubCutaneous every 6 hours  insulin NPH human recombinant 6 Unit(s) SubCutaneous every 6 hours  levETIRAcetam  Solution 1000 milliGRAM(s) Oral daily  levETIRAcetam  Solution 250 milliGRAM(s) Oral <User Schedule>  midodrine. 40 milliGRAM(s) Oral <User Schedule>  norepinephrine Infusion 0.05 MICROgram(s)/kG/Min (6.23 mL/Hr) IV Continuous <Continuous>  pantoprazole  Injectable 40 milliGRAM(s) IV Push two times a day  petrolatum Ophthalmic Ointment 1 Application(s) Both EYES four times a day  sodium chloride 1 Gram(s) Oral two times a day  vancomycin  IVPB 1500 milliGRAM(s) IV Intermittent once      PHYSICAL EXAM:  T(C): 38.4 (11-02-23 @ 08:00), Max: 38.4 (11-02-23 @ 08:00)  HR: 109 (11-02-23 @ 10:00) (104 - 134)  BP: --  RR: 25 (11-02-23 @ 10:00) (18 - 32)  SpO2: 100% (11-02-23 @ 10:00) (98% - 100%)  Wt(kg): --  I&O's Summary    01 Nov 2023 07:01  -  02 Nov 2023 07:00  --------------------------------------------------------  IN: 1060 mL / OUT: 2400 mL / NET: -1340 mL    02 Nov 2023 07:01  -  02 Nov 2023 10:20  --------------------------------------------------------  IN: 390 mL / OUT: 0 mL / NET: 390 mL              Appearance: NAD, +trach  HEENT: dry oral mucosa  Lymphatic: No lymphadenopathy  Cardiovascular: Normal S1 S2, No JVD, No murmurs, No edema  Respiratory: Decreased BS, + trach to vent	  Neuro: opens eyes  Gastrointestinal: Soft, Non-tender, + BS, + NGT  Skin: No rashes, No ecchymoses, No cyanosis	  Extremities: No strength/ROM 2/2 sedation, + BL LE edema  Vascular: Peripheral pulses palpable 2+ bilaterally  Anasarca   Mode: AC/ CMV (Assist Control/ Continuous Mandatory Ventilation), RR (machine): 24, FiO2: 35, PEEP: 8, ITime: 0.8, MAP: 19, PC: 35, PIP: 43        LABS:    CARDIAC MARKERS:                                7.2    13.89 )-----------( 422      ( 02 Nov 2023 00:15 )             24.3     11-02    132<L>  |  98  |  21  ----------------------------<  196<H>  4.1   |  26  |  0.97    Ca    8.5      02 Nov 2023 00:15  Phos  3.8     11-02  Mg     2.20     11-02    TPro  6.1  /  Alb  2.0<L>  /  TBili  0.2  /  DBili  x   /  AST  17  /  ALT  9   /  AlkPhos  235<H>  11-02          TELEMETRY: SR 	    ECG:  	  RADIOLOGY:   DIAGNOSTIC TESTING:  [ ] Echocardiogram:  [ ]  Catheterization:  [ ] Stress Test:    OTHER:

## 2023-11-02 NOTE — PROGRESS NOTE ADULT - SUBJECTIVE AND OBJECTIVE BOX
patient sedated on vent    HEENT:  Left Ear: No mastoid tenderness elicited. Patient reacts to manipulation of external ear. + serous drainage with crusting to auricle. Debrided area. EAC edematous, unable to visualize TM. Wick placed.

## 2023-11-02 NOTE — PROGRESS NOTE ADULT - SUBJECTIVE AND OBJECTIVE BOX
Patient is a 73y old  Female who presents with a chief complaint of Respiratory distress (02 Nov 2023 15:20)      SUBJECTIVE / OVERNIGHT EVENTS: remains in MICU 2/2 pressor requirement, on pressor assisted HD MWF, Levo weaned off last night, not responsive, has recurrence of Left O. externa, wick placed , has R facial twitching possibly 2/2 new Sz focus. Vanco and Aztreonam resumed 2/2 fever 104.6. ptn has poor tolerance to ABx but does tolerate Vancomycin and Aztreonam. pancultured.     MEDICATIONS  (STANDING):  acetaminophen   IVPB .. 1000 milliGRAM(s) IV Intermittent once  albuterol    0.083% 2.5 milliGRAM(s) Nebulizer every 6 hours  apixaban 5 milliGRAM(s) Oral every 12 hours  artificial tears (preservative free) Ophthalmic Solution 1 Drop(s) Both EYES every 4 hours  atorvastatin 10 milliGRAM(s) Oral at bedtime  aztreonam  IVPB 2000 milliGRAM(s) IV Intermittent <User Schedule>  chlorhexidine 0.12% Liquid 15 milliLiter(s) Oral Mucosa every 12 hours  chlorhexidine 2% Cloths 1 Application(s) Topical daily  ciprofloxacin/hydrocortisone Suspension Otic 4 Drop(s) Left Ear two times a day  dextrose 5%. 1000 milliLiter(s) (50 mL/Hr) IV Continuous <Continuous>  dextrose 5%. 1000 milliLiter(s) (100 mL/Hr) IV Continuous <Continuous>  dextrose 50% Injectable 12.5 Gram(s) IV Push once  dextrose 50% Injectable 25 Gram(s) IV Push once  dextrose 50% Injectable 25 Gram(s) IV Push once  dextrose 50% Injectable 25 Gram(s) IV Push once  droxidopa 600 milliGRAM(s) Oral every 8 hours  epoetin odalis (EPOGEN) Injectable 70775 Unit(s) IV Push <User Schedule>  ferrous    sulfate Liquid 300 milliGRAM(s) Oral daily  glucagon  Injectable 1 milliGRAM(s) IntraMuscular once  insulin lispro (ADMELOG) corrective regimen sliding scale   SubCutaneous every 6 hours  insulin NPH human recombinant 6 Unit(s) SubCutaneous every 6 hours  levETIRAcetam  Solution 1000 milliGRAM(s) Oral daily  levETIRAcetam  Solution 250 milliGRAM(s) Oral <User Schedule>  midodrine. 40 milliGRAM(s) Oral <User Schedule>  norepinephrine Infusion 0.05 MICROgram(s)/kG/Min (6.23 mL/Hr) IV Continuous <Continuous>  pantoprazole  Injectable 40 milliGRAM(s) IV Push two times a day  petrolatum Ophthalmic Ointment 1 Application(s) Both EYES four times a day  sodium chloride 1 Gram(s) Oral two times a day    MEDICATIONS  (PRN):  acetaminophen   Oral Liquid .. 650 milliGRAM(s) Oral every 6 hours PRN Temp greater or equal to 38C (100.4F), Mild Pain (1 - 3)  dextrose Oral Gel 15 Gram(s) Oral once PRN Blood Glucose LESS THAN 70 milliGRAM(s)/deciliter  diphenhydrAMINE Elixir 50 milliGRAM(s) Oral once PRN Rash and/or Itching  droxidopa 200 milliGRAM(s) Oral <User Schedule> PRN Prior to hemodialysis  midodrine. 20 milliGRAM(s) Oral <User Schedule> PRN 1 Hour Pre HD  sodium chloride 0.9% Bolus. 250 milliLiter(s) IV Bolus every 5 minutes PRN SBP LESS THAN or EQUAL to 90 mmHg  sodium chloride 0.9% lock flush 10 milliLiter(s) IV Push every 1 hour PRN Pre/post blood products, medications, blood draw, and to maintain line patency      Vital Signs Last 24 Hrs  T(F): 104.6 (11-02-23 @ 16:00), Max: 104.6 (11-02-23 @ 16:00)  HR: 111 (11-02-23 @ 17:00) (101 - 134)  BP: --  RR: 23 (11-02-23 @ 17:00) (18 - 32)  SpO2: 100% (11-02-23 @ 17:00) (97% - 100%)  Telemetry:   CAPILLARY BLOOD GLUCOSE      POCT Blood Glucose.: 230 mg/dL (02 Nov 2023 11:23)  POCT Blood Glucose.: 197 mg/dL (02 Nov 2023 06:14)  POCT Blood Glucose.: 200 mg/dL (01 Nov 2023 23:59)  POCT Blood Glucose.: 143 mg/dL (01 Nov 2023 17:55)    I&O's Summary    01 Nov 2023 07:01  -  02 Nov 2023 07:00  --------------------------------------------------------  IN: 1060 mL / OUT: 2400 mL / NET: -1340 mL    02 Nov 2023 07:01  -  02 Nov 2023 17:08  --------------------------------------------------------  IN: 1040 mL / OUT: 0 mL / NET: 1040 mL        PHYSICAL EXAM:  GENERAL: NAD, well-developed  HEAD:  Atraumatic, Normocephalic  EYES: EOMI, PERRLA, conjunctiva and sclera clear  NECK: Supple, No JVD  CHEST/LUNG: Clear to auscultation bilaterally; No wheeze  HEART: Regular rate and rhythm; No murmurs, rubs, or gallops  ABDOMEN: Soft, Nontender, Nondistended; Bowel sounds present  EXTREMITIES:  2+ Peripheral Pulses, No clubbing, cyanosis, or edema  PSYCH: AAOx3  NEUROLOGY: non-focal  SKIN: No rashes or lesions    LABS:                        7.2    13.89 )-----------( 422      ( 02 Nov 2023 00:15 )             24.3     11-02    132<L>  |  98  |  21  ----------------------------<  196<H>  4.1   |  26  |  0.97    Ca    8.5      02 Nov 2023 00:15  Phos  3.8     11-02  Mg     2.20     11-02    TPro  6.1  /  Alb  2.0<L>  /  TBili  0.2  /  DBili  x   /  AST  17  /  ALT  9   /  AlkPhos  235<H>  11-02    PTT - ( 02 Nov 2023 00:15 )  PTT:33.8 sec      Urinalysis Basic - ( 02 Nov 2023 00:15 )    Color: x / Appearance: x / SG: x / pH: x  Gluc: 196 mg/dL / Ketone: x  / Bili: x / Urobili: x   Blood: x / Protein: x / Nitrite: x   Leuk Esterase: x / RBC: x / WBC x   Sq Epi: x / Non Sq Epi: x / Bacteria: x        RADIOLOGY & ADDITIONAL TESTS:    Imaging Personally Reviewed:    Consultant(s) Notes Reviewed:      Care Discussed with Consultants/Other Providers:

## 2023-11-02 NOTE — PROGRESS NOTE ADULT - SUBJECTIVE AND OBJECTIVE BOX
Follow Up:  MSSA bacteremia, otitis external, VAP, fever    Interval History: pt had new fevers and today to 104.1 and tachy, also L ear again had drainage, was seen by ENT    ROS:  unable to obtain because: trached, AMS        Allergies  isoniazid (Rash)  nafcillin (Unknown)  hydrALAZINE (Rash)  vitamin E (Short breath; Urticaria; Hives)  doxycycline (Rash)  cefepime (Rash)  NIFEdipine (Urticaria; Hives)  Zosyn (Rash)        ANTIMICROBIALS:  aztreonam  IVPB 2000 <User Schedule>      OTHER MEDS:  acetaminophen   Oral Liquid .. 650 milliGRAM(s) Oral every 6 hours PRN  albuterol    0.083% 2.5 milliGRAM(s) Nebulizer every 6 hours  apixaban 5 milliGRAM(s) Oral every 12 hours  artificial tears (preservative free) Ophthalmic Solution 1 Drop(s) Both EYES every 4 hours  atorvastatin 10 milliGRAM(s) Oral at bedtime  chlorhexidine 0.12% Liquid 15 milliLiter(s) Oral Mucosa every 12 hours  chlorhexidine 2% Cloths 1 Application(s) Topical daily  ciprofloxacin/hydrocortisone Suspension Otic 4 Drop(s) Left Ear two times a day  dextrose 5%. 1000 milliLiter(s) IV Continuous <Continuous>  dextrose 5%. 1000 milliLiter(s) IV Continuous <Continuous>  dextrose 50% Injectable 12.5 Gram(s) IV Push once  dextrose 50% Injectable 25 Gram(s) IV Push once  dextrose 50% Injectable 25 Gram(s) IV Push once  dextrose 50% Injectable 25 Gram(s) IV Push once  dextrose Oral Gel 15 Gram(s) Oral once PRN  diphenhydrAMINE Elixir 50 milliGRAM(s) Oral once PRN  droxidopa 600 milliGRAM(s) Oral every 8 hours  droxidopa 200 milliGRAM(s) Oral <User Schedule> PRN  epoetin odalis (EPOGEN) Injectable 89247 Unit(s) IV Push <User Schedule>  ferrous    sulfate Liquid 300 milliGRAM(s) Oral daily  glucagon  Injectable 1 milliGRAM(s) IntraMuscular once  insulin lispro (ADMELOG) corrective regimen sliding scale   SubCutaneous every 6 hours  insulin NPH human recombinant 6 Unit(s) SubCutaneous every 6 hours  levETIRAcetam  Solution 1000 milliGRAM(s) Oral daily  levETIRAcetam  Solution 250 milliGRAM(s) Oral <User Schedule>  midodrine. 20 milliGRAM(s) Oral <User Schedule> PRN  midodrine. 40 milliGRAM(s) Oral <User Schedule>  norepinephrine Infusion 0.05 MICROgram(s)/kG/Min IV Continuous <Continuous>  pantoprazole  Injectable 40 milliGRAM(s) IV Push two times a day  petrolatum Ophthalmic Ointment 1 Application(s) Both EYES four times a day  sodium chloride 1 Gram(s) Oral two times a day  sodium chloride 0.9% Bolus. 250 milliLiter(s) IV Bolus every 5 minutes PRN  sodium chloride 0.9% lock flush 10 milliLiter(s) IV Push every 1 hour PRN      Vital Signs Last 24 Hrs  T(C): 40.1 (02 Nov 2023 12:00), Max: 40.1 (02 Nov 2023 12:00)  T(F): 104.1 (02 Nov 2023 12:00), Max: 104.1 (02 Nov 2023 12:00)  HR: 109 (02 Nov 2023 15:00) (102 - 134)  BP: --  BP(mean): --  RR: 23 (02 Nov 2023 15:00) (18 - 32)  SpO2: 100% (02 Nov 2023 15:00) (97% - 100%)    Parameters below as of 02 Nov 2023 15:00  Patient On (Oxygen Delivery Method): ventilator    O2 Concentration (%): 50    Physical Exam:  General:    trached   Respiratory:   on vent  abd:  distended but soft  :  no  willard  Musculoskeletal : generalized edema  Skin: resolved erythematous rash but hyperpigmented thickening on lower abd  vascular: galo odom                         7.2    13.89 )-----------( 422      ( 02 Nov 2023 00:15 )             24.3       11-02    132<L>  |  98  |  21  ----------------------------<  196<H>  4.1   |  26  |  0.97    Ca    8.5      02 Nov 2023 00:15  Phos  3.8     11-02  Mg     2.20     11-02    TPro  6.1  /  Alb  2.0<L>  /  TBili  0.2  /  DBili  x   /  AST  17  /  ALT  9   /  AlkPhos  235<H>  11-02      Urinalysis Basic - ( 02 Nov 2023 00:15 )    Color: x / Appearance: x / SG: x / pH: x  Gluc: 196 mg/dL / Ketone: x  / Bili: x / Urobili: x   Blood: x / Protein: x / Nitrite: x   Leuk Esterase: x / RBC: x / WBC x   Sq Epi: x / Non Sq Epi: x / Bacteria: x        MICROBIOLOGY:  v  .Blood  10-29-23   No Blood Parasites observed by giemsa stain  One negative set of blood smears does not rule out  the possibility of a parasitic infection.  A minimum of 3  specimens should be collected, at least 12-24 hours apart,  over a 36 hour time period.  ************************************************************  NEGATIVE for Plasmodium antigens. Microscopy is performed for  confirmation.  The Malaria Rapid antigen test does not detect the  presence of Babesia species. If Babesiosis is suspected  please order test Babesia PCR: Babesia microti PCR Bld  ************************************************************  --  --      .Blood Blood-Venous  10-17-23   No growth at 5 days  --  --      .Blood Blood-Peripheral  10-14-23   No growth at 5 days  --  --      Trach Asp Tracheal Aspirate  10-14-23   Numerous Pseudomonas aeruginosa (Carbapenem Resistant)  Numerous Proteus mirabilis  Normal Respiratory Cate present  --  Pseudomonas aeruginosa (Carbapenem Resistant)  Proteus mirabilis      .Blood Blood-Venous  10-09-23   No growth at 5 days  --  --      Trach Asp Tracheal Aspirate  10-03-23   Numerous Pseudomonas aeruginosa (Carbapenem Resistant) Susceptibility to  follow.  Normal Respiratory Cate absent  --  Pseudomonas aeruginosa (Carbapenem Resistant)                RADIOLOGY:  Images independently visualized and reviewed personally, findings as below  < from: Xray Chest 1 View- PORTABLE-Urgent (Xray Chest 1 View- PORTABLE-Urgent .) (10.24.23 @ 00:35) >    IMPRESSION:  Tubes and lines in place.    Pulmonary edema with right apical opacity unchanged.    < end of copied text >  < from: TTE W or WO Ultrasound Enhancing Agent (10.01.23 @ 10:07) >     CONCLUSIONS:      1. Left ventricular systolic function is normal with an ejection fraction visually estimated at 60 to 65 %.   2. The right ventricle is not well visualized.   3. Although there is no evidence of endocarditis on this study, the valves are not visualized well. Suggest STEPHANIE if clinically appropriate.    < end of copied text >

## 2023-11-02 NOTE — PROGRESS NOTE ADULT - SUBJECTIVE AND OBJECTIVE BOX
S:  Pt seen and examined. Fever, ? recurrent L otitis externa. Back on Abx      Medications: MEDICATIONS  (STANDING):  albuterol    0.083% 2.5 milliGRAM(s) Nebulizer every 6 hours  apixaban 5 milliGRAM(s) Oral every 12 hours  artificial tears (preservative free) Ophthalmic Solution 1 Drop(s) Both EYES every 4 hours  atorvastatin 10 milliGRAM(s) Oral at bedtime  aztreonam  IVPB 2000 milliGRAM(s) IV Intermittent <User Schedule>  chlorhexidine 0.12% Liquid 15 milliLiter(s) Oral Mucosa every 12 hours  chlorhexidine 2% Cloths 1 Application(s) Topical daily  ciprofloxacin/hydrocortisone Suspension Otic 4 Drop(s) Left Ear two times a day  dextrose 5%. 1000 milliLiter(s) (50 mL/Hr) IV Continuous <Continuous>  dextrose 5%. 1000 milliLiter(s) (100 mL/Hr) IV Continuous <Continuous>  dextrose 50% Injectable 12.5 Gram(s) IV Push once  dextrose 50% Injectable 25 Gram(s) IV Push once  dextrose 50% Injectable 25 Gram(s) IV Push once  dextrose 50% Injectable 25 Gram(s) IV Push once  droxidopa 600 milliGRAM(s) Oral every 8 hours  epoetin odalis (EPOGEN) Injectable 00720 Unit(s) IV Push <User Schedule>  ferrous    sulfate Liquid 300 milliGRAM(s) Oral daily  glucagon  Injectable 1 milliGRAM(s) IntraMuscular once  insulin lispro (ADMELOG) corrective regimen sliding scale   SubCutaneous every 6 hours  insulin NPH human recombinant 6 Unit(s) SubCutaneous every 6 hours  levETIRAcetam  Solution 1000 milliGRAM(s) Oral daily  levETIRAcetam  Solution 250 milliGRAM(s) Oral <User Schedule>  midodrine. 40 milliGRAM(s) Oral <User Schedule>  norepinephrine Infusion 0.05 MICROgram(s)/kG/Min (6.23 mL/Hr) IV Continuous <Continuous>  pantoprazole  Injectable 40 milliGRAM(s) IV Push two times a day  petrolatum Ophthalmic Ointment 1 Application(s) Both EYES four times a day  sodium chloride 1 Gram(s) Oral two times a day    MEDICATIONS  (PRN):  acetaminophen   Oral Liquid .. 650 milliGRAM(s) Oral every 6 hours PRN Temp greater or equal to 38C (100.4F), Mild Pain (1 - 3)  dextrose Oral Gel 15 Gram(s) Oral once PRN Blood Glucose LESS THAN 70 milliGRAM(s)/deciliter  diphenhydrAMINE Elixir 50 milliGRAM(s) Oral once PRN Rash and/or Itching  droxidopa 200 milliGRAM(s) Oral <User Schedule> PRN Prior to hemodialysis  midodrine. 20 milliGRAM(s) Oral <User Schedule> PRN 1 Hour Pre HD  sodium chloride 0.9% Bolus. 250 milliLiter(s) IV Bolus every 5 minutes PRN SBP LESS THAN or EQUAL to 90 mmHg  sodium chloride 0.9% lock flush 10 milliLiter(s) IV Push every 1 hour PRN Pre/post blood products, medications, blood draw, and to maintain line patency       Vitals:  Vital Signs Last 24 Hrs  T(C): 36.3 (02 Nov 2023 20:00), Max: 40.3 (02 Nov 2023 16:00)  T(F): 97.4 (02 Nov 2023 20:00), Max: 104.6 (02 Nov 2023 16:00)  HR: 105 (02 Nov 2023 20:00) (101 - 129)  BP: --  BP(mean): --  RR: 22 (02 Nov 2023 20:00) (18 - 32)  SpO2: 100% (02 Nov 2023 20:00) (97% - 100%)    Parameters below as of 02 Nov 2023 20:00  Patient On (Oxygen Delivery Method): ventilator    O2 Concentration (%): 50          Neurological Exam:  Mental Status: Eyes closed, minimal spont eye opening off sedation. Does not follow commands,  + trach to vent and NGT   Cranial Nerves: PERRL slightly sluggish, L subconjunctival hemorrhage improved. R facial twitching noted by mouth lessened today- ? suppressible. occasional head dystonia  Motor: Moves upper extremities spontaneously R >L, tremor R > L  no movement noted in lowers  Sensation: WD to noxious x4    I personally reviewed the below data/images/labs:    LABS:                          7.2    13.89 )-----------( 422      ( 02 Nov 2023 00:15 )             24.3     11-02    132<L>  |  98  |  21  ----------------------------<  196<H>  4.1   |  26  |  0.97    Ca    8.5      02 Nov 2023 00:15  Phos  3.8     11-02  Mg     2.20     11-02    TPro  6.1  /  Alb  2.0<L>  /  TBili  0.2  /  DBili  x   /  AST  17  /  ALT  9   /  AlkPhos  235<H>  11-02    LIVER FUNCTIONS - ( 02 Nov 2023 00:15 )  Alb: 2.0 g/dL / Pro: 6.1 g/dL / ALK PHOS: 235 U/L / ALT: 9 U/L / AST: 17 U/L / GGT: x           PTT - ( 02 Nov 2023 00:15 )  PTT:33.8 sec  Urinalysis Basic - ( 02 Nov 2023 00:15 )    Color: x / Appearance: x / SG: x / pH: x  Gluc: 196 mg/dL / Ketone: x  / Bili: x / Urobili: x   Blood: x / Protein: x / Nitrite: x   Leuk Esterase: x / RBC: x / WBC x   Sq Epi: x / Non Sq Epi: x / Bacteria: x              < from: CT Head No Cont (08.15.23 @ 17:20) >    ACC: [id number] EXAM:  CT BRAIN   ORDERED BY: MINAL SAM     PROCEDURE DATE:  08/15/2023          INTERPRETATION:  Clinical indication: Change in neuro exam.    Multiple axial sections were performed from base to vertex without   contrast enhancement. Coronal and sagittal reconstructions were   reformatted well.    This exam is compared prior head CT performed on August 11, 2023    Parenchymal volume loss and chronic microvessel ischemic changes are   again seen.    Abnormal low-attenuation involving the left occipital cortical   subcortical region is again seen. 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    No evidence of acute hemorrhage mass or mass effect is seen.    Evaluation of the osseous structures with appropriate window demonstrates   sclerotic changes about the left mastoid region which appears stable.   Opacification left middle ear region is again seen.    Patient is status post bilateral cataract surgery.    Impression: Stable exam.    < end of copied text >    vEEG:    Clinical Impression:  - Severe diffuse non-specific cerebral dysfunction  - There were no epileptiform abnormalities or seizures recorded.        CTH 8/11:    VENTRICLES AND SULCI: Age-appropriate involutional change  INTRA-AXIAL:  Old left PCA infarct as seen on the prior unchanged.   Microvascular ischemic changes involving the periventricular and   subcortical white matter as seen previously  EXTRA-AXIAL:  No mass or collection is seen.  VISUALIZED SINUSES:  Clear.  VISUALIZED MASTOIDS: Left mastoid sclerosis  CALVARIUM: Infiltrative appearance tothe calvarium may be indicative of   marrow infiltration on the basis of patient's known diagnosis of breast   cancer. MISCELLANEOUS:  None.    IMPRESSION:  No significant interval change compared with 7/17/2023 in   left PCA infarct which is old. Microvascular ischemic changes involving   the periventricular and subcortical white matter as seen   previously.Questionable lesions at the level of the calvarium related to   possible breast CA. Clinical correlation recommended.    --- End of Report ---      ct< from: CT Head No Cont (09.26.23 @ 21:02) >  IMPRESSION: Vague questionable areas of hypoattenuation within the   bilateral cerebellar hemispheres which may be compatible with   acute/subacute ischemia. Recommend further evaluation with a brain MRI   study, provided there are no MRI contraindications.    No acute intracranial hemorrhage.    Previously seen questionable vague wedge-shaped area of hypoattenuation   in the left parietal lobe with artifactual secondary to motion and volume   averaging with a prominent sulcus.    Chronic left occipital lobe infarct.    < end of copied text >    < from: CT Head No Cont (10.03.23 @ 20:46) >    IMPRESSION:    No acute intracranial hemorrhage or mass effect. Evaluation of the   posterior fossa degraded by streak artifact from dental amalgam.   Lucencies in the bilateral cerebellum may be artifactual.    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    Bilateral middle ear and mastoid effusions which are also seen on prior   exam.    EEG Classification / Summary:  Abnormal EEG in the awake, drowsy states.   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   -No seizures

## 2023-11-02 NOTE — PROGRESS NOTE ADULT - ASSESSMENT
74 y/o F well known to me from my [facility] [place] outpt practice. she was admitted at [facility] 7/12-7/22 w aspiration PNA, was treated w CEFEPIME, developed an allergic rash,  dCHF, + MAC on AFB culture, had been progressively getting more and more lethargic and dyspneic at home since DC.   In  am of 8/11/23  ptn presented with respiratory distress w hypoxia and hypercarbia requiring intubation 2/2 volume overload +/- Asp PNA      Neuro   not responsive  Baseline MS AOx3, aphasic   - h/o CVA , on aspirin and statin . resumed w feeding tube, ASA resumed 8/26  - eeg  2/2 tremors, no sz focus, right facial twitching, on KEPPRA  - less responsive in the past few days  - MRI 8/17:  new R cerebellar infarct, old left PCA/Occipital infarct. probably embolic in nature, did not tolerate full AC in the past, STEPHANIE is neg , no shunts observed  - ptn is poorly reactive, rpt scan done, no further CVA seen.     Cardiac   cardiology following  CHFpEF   TTE 7/2023 with EF 59%, with severe LVH and diastolic dysfunction   on Levo drip 2/2 hypotension   on midodrine 40 mg qid, droxidopa 600 tid, additional 200 mg prior to HD,     Pulmonary   Acute hypercapnic and hypoxemic respiratory failure   prolonged intubation, now  trache to  vent, has copious secretions      Renal   on HD via L IJ [place], tunneled catheter when clinically stable  HD MWF, midodrine assisted, PUF in between HD days, unable to remove proper volume 2/2 hypotension.   permacath when clinically stable      GI  NPO  on tube feeds  coffee ground emesis x 1 8/24, melena overnight 8/31, s/p 1UPRBC, EGD/Colonoscopy: erosive gastropathy, esophagisits, on colonoscopy old blood seen no active bleeding, poor prep  family to decide re PEG placement    Endocrine  on Insulin: NPH, Admelog    ID   complicated infectious hospital course  treated for MSSA bacteremia, aspiration PNA.  sputum + for pseudomonas, ptn was initially on zosyn but was allergic, then was switched to aztreonam   Aztreonam was switched to polymyxin for a possible allergy but ptn didnt have a reaction to aztreonam, she had a reaction to Zosyn.   spike temps again while off Abx, Aztreonam resumed 10/17, DCed 10/29  tmax 104.6 on 11/2: pancultured, aztreonam and Vancomycin resumed  temp may be 2/2 malignant recurrent Left O. externa. 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 and she remains full code at present

## 2023-11-02 NOTE — PROGRESS NOTE ADULT - ATTENDING COMMENTS
Patient is a 72 yo F (wheelchair bound prior to admission) w/ HFpEF, T2DM, CKD5, latent TB (s/p tx,) hx breast ca (2018, s/p mastectomy and adjuvant CT/RT), and hx prior CVA s/p trach/PEG (decannulated prior to admission, ILR in place) who was initially admitted on 8/11/23 after p/w with acute hypoxemic and hypercapnic respiratory failure thought to be 2/2 Aspiration PNA vs ADHF/flash pulmonary edema in setting of HTN emergency. Patient required intubation and mechanical ventilationtubation, and was admitted to MICU for lengthy period of time.     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     Hospital course also marked by recurrent infections, including MSSA bacteremia (s/p Vanc due to possible cefepime vs. cefazolin drug-induced rash) and MSSA/ESBL EColi in BAL. Also found to hve L otitis externa (9/5) s/p ENT debridement c/b concern for superimposed candida infection (s/p Meropenem, Fluconazole). Most recently w/ Proteus/Pseudomonas in sputum now s/p Aztreonam. Also further found to have L popliteal DVT.    Patient transferred back to MICU for trial of HD, possibly requiring IV vasopressors. A line placed over the weekend revealing inaccurate noninvasive BP measurements    #Shock  #Encephalopathy  #Multiple Strokes  #UGIB  #L popliteal DVT  #L fungal otitis externa  #L endophthalmitis  #Chronic respiratory failure  #Oropharyngeal dysphagia  #RUSS on CKD  #Pleural effusions    - c/w PO vasopressors to maintain MAP > 65 now with accurate BP with A line. Required IV Levo for iHD  - Repeat cultures and started on empiric antibiotics given increase pressor requirements and fevers  - c/w mechanical ventilatory support, trach care per MICU team   - HD as per renal   - c/w tube feeds  - c/w Eliquis, hb stable. Cont ASA  - ENT recs appreciated    Patient examined and case reviewed in detail on bedside rounds. Frequent bedside visits with therapy change today.  Prognosis guarded.

## 2023-11-03 NOTE — PROGRESS NOTE ADULT - ASSESSMENT

## 2023-11-03 NOTE — PROGRESS NOTE ADULT - ASSESSMENT
IMPRESSION: 73F w/ HTN, DM2, CVA, breast CA-bilateral mastectomy, recurrent aspiration pneumonia/respiratory failure, and CKD, 8/11/23 p/w acute hypercapnic respiratory failure; c/b RUSS    (1)Renal - RUSS on CKD4 ==>now newly ESRD. S/p HD today     (2)Hyponatremia - Na+ stable     (3)CV- tenuous hemodynamics such with each passing week we have more difficulty performing HD/achieving UF, persistent issue. BP stable with Jacki, though continues to need pressors for HD    (4)ID- BCx from 10/14/23 NGTD, BC (10/17) NGTD on IV abx     (5)Pulm- trach/vent-dependent    (6)Anemia- hgb low, epo with HD    RECOMMEND:  (1)Next HD [place]: 2 kg UF; pressors and sodium modelling as needed to keep SBP>90; Epogen with HD   (2)Dose new meds for GFR <10/HD    Nilda Espinoza DNP  [facility]  [phone]            IMPRESSION: 73F w/ HTN, DM2, CVA, breast CA-bilateral mastectomy, recurrent aspiration pneumonia/respiratory failure, and CKD, 8/11/23 p/w acute hypercapnic respiratory failure; c/b RUSS    (1)Renal - RUSS on CKD4 ==>now newly ESRD. S/p HD today     (2)Hyponatremia - Na+ stable     (3)CV- tenuous hemodynamics such with each passing week we have more difficulty performing HD/achieving UF, persistent issue. BP stable with [place], though continues to need pressors for HD    (4)ID- BCx from 10/14/23 NGTD, BC (10/17) NGTD on IV abx     (5)Pulm- trach/vent-dependent    (6)Anemia- hgb low, epo with HD    RECOMMEND:  (1)Next HD [place]: 2 kg UF; pressors and sodium modelling as needed to keep SBP>90; Epogen with HD   (2)Dose new meds for GFR <10/HD    Nilda Espinoza DNP  [facility]  [phone]     RENAL ATTENDING NOTE  Patient seen and examined with NP. Agree with assessment and plan as above.    Jimmy Colon MD  [facility]  [phone]

## 2023-11-03 NOTE — PROGRESS NOTE ADULT - ATTENDING COMMENTS
Patient is a 74 yo F (wheelchair bound prior to admission) w/ HFpEF, T2DM, CKD5, latent TB (s/p tx,) hx breast ca (2018, s/p mastectomy and adjuvant CT/RT), and hx prior CVA s/p trach/PEG (decannulated prior to admission, ILR in place) who was initially admitted on 8/11/23 after p/w with acute hypoxemic and hypercapnic respiratory failure thought to be 2/2 Aspiration PNA vs ADHF/flash pulmonary edema in setting of HTN emergency. Patient required intubation and mechanical ventilationtubation, and was admitted to MICU for lengthy period of time.     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     Hospital course also marked by recurrent infections, including MSSA bacteremia (s/p Vanc due to possible cefepime vs. cefazolin drug-induced rash) and MSSA/ESBL EColi in BAL. Also found to hve L otitis externa (9/5) s/p ENT debridement c/b concern for superimposed candida infection (s/p Meropenem, Fluconazole). Most recently w/ Proteus/Pseudomonas in sputum now s/p Aztreonam. Also further found to have L popliteal DVT.    Patient transferred back to MICU for trial of HD, possibly requiring IV vasopressors. A line placed over the weekend revealing inaccurate noninvasive BP measurements    #Shock  #Encephalopathy  #Multiple Strokes  #UGIB  #L popliteal DVT  #L fungal otitis externa  #L endophthalmitis  #Chronic respiratory failure  #Oropharyngeal dysphagia  #RUSS on CKD  #Pleural effusions    - Wean vasopressors to maintain MAP > 65    - Tracheal aspirate growing GNRs, cont abx, follow up speciation  - c/w mechanical ventilatory support, trach care per MICU team   - HD as per renal   - c/w tube feeds  - c/w Eliquis, hb stable. Cont ASA  - ENT recs appreciated    Patient examined and case reviewed in detail on bedside rounds. Frequent bedside visits with therapy change today.  Prognosis guarded.

## 2023-11-03 NOTE — PROGRESS NOTE ADULT - SUBJECTIVE AND OBJECTIVE BOX
Follow Up:  MSSA bacteremia, otitis external, VAP, fever     Interval History: no more fever today, WBC :13  blood cx negative    ROS:  unable to obtain because: trached, AMS          Allergies  isoniazid (Rash)  nafcillin (Unknown)  hydrALAZINE (Rash)  vitamin E (Short breath; Urticaria; Hives)  doxycycline (Rash)  cefepime (Rash)  NIFEdipine (Urticaria; Hives)  Zosyn (Rash)        ANTIMICROBIALS:  aztreonam  IVPB 2000 <User Schedule>      OTHER MEDS:  acetaminophen   Oral Liquid .. 650 milliGRAM(s) Oral every 6 hours PRN  albuterol    0.083% 2.5 milliGRAM(s) Nebulizer every 6 hours  apixaban 5 milliGRAM(s) Oral every 12 hours  artificial tears (preservative free) Ophthalmic Solution 1 Drop(s) Both EYES every 4 hours  atorvastatin 10 milliGRAM(s) Oral at bedtime  chlorhexidine 0.12% Liquid 15 milliLiter(s) Oral Mucosa every 12 hours  chlorhexidine 2% Cloths 1 Application(s) Topical daily  ciprofloxacin/hydrocortisone Suspension Otic 4 Drop(s) Left Ear two times a day  dextrose 5%. 1000 milliLiter(s) IV Continuous <Continuous>  dextrose 5%. 1000 milliLiter(s) IV Continuous <Continuous>  dextrose 50% Injectable 12.5 Gram(s) IV Push once  dextrose 50% Injectable 25 Gram(s) IV Push once  dextrose 50% Injectable 25 Gram(s) IV Push once  dextrose 50% Injectable 25 Gram(s) IV Push once  dextrose Oral Gel 15 Gram(s) Oral once PRN  diphenhydrAMINE Elixir 50 milliGRAM(s) Oral once PRN  droxidopa 600 milliGRAM(s) Oral every 8 hours  droxidopa 200 milliGRAM(s) Oral <User Schedule> PRN  epoetin odalis (EPOGEN) Injectable 30222 Unit(s) IV Push <User Schedule>  ferrous    sulfate Liquid 300 milliGRAM(s) Oral daily  glucagon  Injectable 1 milliGRAM(s) IntraMuscular once  insulin lispro (ADMELOG) corrective regimen sliding scale   SubCutaneous every 6 hours  insulin NPH human recombinant 6 Unit(s) SubCutaneous every 6 hours  levETIRAcetam  Solution 1000 milliGRAM(s) Oral daily  levETIRAcetam  Solution 250 milliGRAM(s) Oral <User Schedule>  midodrine. 20 milliGRAM(s) Oral <User Schedule> PRN  midodrine. 40 milliGRAM(s) Oral <User Schedule>  norepinephrine Infusion 0.05 MICROgram(s)/kG/Min IV Continuous <Continuous>  pantoprazole  Injectable 40 milliGRAM(s) IV Push two times a day  petrolatum Ophthalmic Ointment 1 Application(s) Both EYES four times a day  sodium chloride 1 Gram(s) Oral two times a day  sodium chloride 0.9% Bolus. 250 milliLiter(s) IV Bolus every 5 minutes PRN  sodium chloride 0.9% lock flush 10 milliLiter(s) IV Push every 1 hour PRN      Vital Signs Last 24 Hrs  T(C): 36.9 (03 Nov 2023 12:00), Max: 40.3 (02 Nov 2023 16:00)  T(F): 98.5 (03 Nov 2023 12:00), Max: 104.6 (02 Nov 2023 16:00)  HR: 111 (03 Nov 2023 14:00) (14 - 134)  BP: --  BP(mean): --  RR: 19 (03 Nov 2023 14:00) (16 - 31)  SpO2: 100% (03 Nov 2023 14:00) (94% - 100%)    Parameters below as of 03 Nov 2023 14:00  Patient On (Oxygen Delivery Method): ventilator  O2 Flow (L/min): 40      Physical Exam:  General:    NAD,  non toxic  Head: atraumatic, normocephalic  Eye: normal sclera and conjunctiva  ENT:    no oropharyngeal lesions,   no LAD,   neck supple  Cardio:     regular S1, S2,  no murmur  Respiratory:    clear b/l,    no wheezing  abd:     soft,   BS +,   no tenderness  :   no CVAT,  no suprapubic tenderness,   no  willard  Musculoskeletal:   no joint swelling  vascular: no phlebitis  Skin:    no rash  Neurologic:     no focal deficit  psych: normal affect                          7.6    13.71 )-----------( 464      ( 03 Nov 2023 00:10 )             26.2       11-03    137  |  102  |  27<H>  ----------------------------<  101<H>  4.5   |  26  |  1.28    Ca    8.6      03 Nov 2023 00:10  Phos  5.2     11-03  Mg     2.30     11-03    TPro  6.6  /  Alb  2.1<L>  /  TBili  <0.2  /  DBili  x   /  AST  23  /  ALT  10  /  AlkPhos  246<H>  11-03      Urinalysis Basic - ( 03 Nov 2023 00:10 )    Color: x / Appearance: x / SG: x / pH: x  Gluc: 101 mg/dL / Ketone: x  / Bili: x / Urobili: x   Blood: x / Protein: x / Nitrite: x   Leuk Esterase: x / RBC: x / WBC x   Sq Epi: x / Non Sq Epi: x / Bacteria: x        MICROBIOLOGY:  v  ET Tube ET Tube  11-02-23 --  --    Moderate polymorphonuclear leukocytes per low power field  No Squamous epithelial cells per low power field  Moderate Gram Negative Rods per oil power field      .Blood Blood-Peripheral  11-02-23   No growth at 24 hours  --  --      .Blood Blood-Peripheral  11-02-23   No growth at 24 hours  --  --      .Blood  10-29-23   No Blood Parasites observed by giemsa stain  One negative set of blood smears does not rule out  the possibility of a parasitic infection.  A minimum of 3  specimens should be collected, at least 12-24 hours apart,  over a 36 hour time period.  ************************************************************  NEGATIVE for Plasmodium antigens. Microscopy is performed for  confirmation.  The Malaria Rapid antigen test does not detect the  presence of Babesia species. If Babesiosis is suspected  please order test Babesia PCR: Babesia microti PCR Bld  ************************************************************  --  --      .Blood Blood-Venous  10-17-23   No growth at 5 days  --  --      .Blood Blood-Peripheral  10-14-23   No growth at 5 days  --  --      Trach Asp Tracheal Aspirate  10-14-23   Numerous Pseudomonas aeruginosa (Carbapenem Resistant)  Numerous Proteus mirabilis  Normal Respiratory Cate present  --  Pseudomonas aeruginosa (Carbapenem Resistant)  Proteus mirabilis      .Blood Blood-Venous  10-09-23   No growth at 5 days  --  --                RADIOLOGY:  Images independently visualized and reviewed personally, findings as below  < from: Xray Chest 1 View- PORTABLE-Urgent (Xray Chest 1 View- PORTABLE-Urgent .) (10.24.23 @ 00:35) >  IMPRESSION:  Tubes and lines in place.    Pulmonary edema with right apical opacity unchanged.      < end of copied text >  < from: TTE W or WO Ultrasound Enhancing Agent (10.01.23 @ 10:07) >  CONCLUSIONS:      1. Left ventricular systolic function is normal with an ejection fraction visually estimated at 60 to 65 %.   2. The right ventricle is not well visualized.   3. Although there is no evidence of endocarditis on this study, the valves are not visualized well. Suggest STEPHANIE if clinically appropriate.    < end of copied text >

## 2023-11-03 NOTE — PROGRESS NOTE ADULT - SUBJECTIVE AND OBJECTIVE BOX
Patient is a 73y old  Female who presents with a chief complaint of Respiratory distress (03 Nov 2023 15:09)      SUBJECTIVE / OVERNIGHT EVENTS: neuro recommends to dc Keppra since gerri true sz to watch if MS would improve, tolerated 2L removal in HD, on Aztreonam, all Cx NTD, remains on Aztreonam, L. O. externa improving, wick removed     MEDICATIONS  (STANDING):  albuterol    0.083% 2.5 milliGRAM(s) Nebulizer every 6 hours  apixaban 5 milliGRAM(s) Oral every 12 hours  artificial tears (preservative free) Ophthalmic Solution 1 Drop(s) Both EYES every 4 hours  atorvastatin 10 milliGRAM(s) Oral at bedtime  aztreonam  IVPB 2000 milliGRAM(s) IV Intermittent <User Schedule>  chlorhexidine 0.12% Liquid 15 milliLiter(s) Oral Mucosa every 12 hours  chlorhexidine 2% Cloths 1 Application(s) Topical daily  ciprofloxacin/hydrocortisone Suspension Otic 4 Drop(s) Left Ear two times a day  dextrose 5%. 1000 milliLiter(s) (50 mL/Hr) IV Continuous <Continuous>  dextrose 5%. 1000 milliLiter(s) (100 mL/Hr) IV Continuous <Continuous>  dextrose 50% Injectable 12.5 Gram(s) IV Push once  dextrose 50% Injectable 25 Gram(s) IV Push once  dextrose 50% Injectable 25 Gram(s) IV Push once  dextrose 50% Injectable 25 Gram(s) IV Push once  droxidopa 600 milliGRAM(s) Oral every 8 hours  epoetin odalis (EPOGEN) Injectable 48837 Unit(s) IV Push <User Schedule>  ferrous    sulfate Liquid 300 milliGRAM(s) Oral daily  glucagon  Injectable 1 milliGRAM(s) IntraMuscular once  insulin lispro (ADMELOG) corrective regimen sliding scale   SubCutaneous every 6 hours  insulin NPH human recombinant 6 Unit(s) SubCutaneous every 6 hours  levETIRAcetam  Solution 1000 milliGRAM(s) Oral daily  levETIRAcetam  Solution 250 milliGRAM(s) Oral <User Schedule>  midodrine. 40 milliGRAM(s) Oral <User Schedule>  norepinephrine Infusion 0.05 MICROgram(s)/kG/Min (6.23 mL/Hr) IV Continuous <Continuous>  pantoprazole  Injectable 40 milliGRAM(s) IV Push two times a day  petrolatum Ophthalmic Ointment 1 Application(s) Both EYES four times a day  sodium chloride 1 Gram(s) Oral two times a day    MEDICATIONS  (PRN):  acetaminophen   Oral Liquid .. 650 milliGRAM(s) Oral every 6 hours PRN Temp greater or equal to 38C (100.4F), Mild Pain (1 - 3)  dextrose Oral Gel 15 Gram(s) Oral once PRN Blood Glucose LESS THAN 70 milliGRAM(s)/deciliter  diphenhydrAMINE Elixir 50 milliGRAM(s) Oral once PRN Rash and/or Itching  droxidopa 200 milliGRAM(s) Oral <User Schedule> PRN Prior to hemodialysis  midodrine. 20 milliGRAM(s) Oral <User Schedule> PRN 1 Hour Pre HD  sodium chloride 0.9% Bolus. 250 milliLiter(s) IV Bolus every 5 minutes PRN SBP LESS THAN or EQUAL to 90 mmHg  sodium chloride 0.9% lock flush 10 milliLiter(s) IV Push every 1 hour PRN Pre/post blood products, medications, blood draw, and to maintain line patency      Vital Signs Last 24 Hrs  T(F): 100 (11-03-23 @ 20:00), Max: 100 (11-03-23 @ 20:00)  HR: 113 (11-03-23 @ 21:00) (78 - 134)  BP: 120/46  RR: 24 (11-03-23 @ 21:00) (16 - 31)  SpO2: 100% (11-03-23 @ 21:00) (94% - 100%)  Telemetry:   CAPILLARY BLOOD GLUCOSE      POCT Blood Glucose.: 151 mg/dL (03 Nov 2023 17:24)  POCT Blood Glucose.: 145 mg/dL (03 Nov 2023 13:03)  POCT Blood Glucose.: 110 mg/dL (03 Nov 2023 05:15)  POCT Blood Glucose.: 112 mg/dL (02 Nov 2023 23:26)    I&O's Summary    02 Nov 2023 07:01  -  03 Nov 2023 07:00  --------------------------------------------------------  IN: 1200 mL / OUT: 0 mL / NET: 1200 mL    03 Nov 2023 07:01  -  03 Nov 2023 21:48  --------------------------------------------------------  IN: 1111.2 mL / OUT: 2500 mL / NET: -1388.8 mL        PHYSICAL EXAM:  GENERAL: NAD, well-developed  HEAD:  Atraumatic, Normocephalic  EYES: EOMI, PERRLA, conjunctiva and sclera clear  NECK: Supple, No JVD  CHEST/LUNG: Clear to auscultation bilaterally; No wheeze  HEART: Regular rate and rhythm; No murmurs, rubs, or gallops  ABDOMEN: Soft, Nontender, Nondistended; Bowel sounds present  EXTREMITIES:  2+ Peripheral Pulses, No clubbing, cyanosis, or edema  PSYCH: AAOx3  NEUROLOGY: non-focal  SKIN: No rashes or lesions    LABS:                        7.6    13.71 )-----------( 464      ( 03 Nov 2023 00:10 )             26.2     11-03    137  |  102  |  27<H>  ----------------------------<  101<H>  4.5   |  26  |  1.28    Ca    8.6      03 Nov 2023 00:10  Phos  5.2     11-03  Mg     2.30     11-03    TPro  6.6  /  Alb  2.1<L>  /  TBili  <0.2  /  DBili  x   /  AST  23  /  ALT  10  /  AlkPhos  246<H>  11-03    PTT - ( 03 Nov 2023 00:10 )  PTT:32.9 sec      Urinalysis Basic - ( 03 Nov 2023 00:10 )    Color: x / Appearance: x / SG: x / pH: x  Gluc: 101 mg/dL / Ketone: x  / Bili: x / Urobili: x   Blood: x / Protein: x / Nitrite: x   Leuk Esterase: x / RBC: x / WBC x   Sq Epi: x / Non Sq Epi: x / Bacteria: x        RADIOLOGY & ADDITIONAL TESTS:    Imaging Personally Reviewed:    Consultant(s) Notes Reviewed:      Care Discussed with Consultants/Other Providers:

## 2023-11-03 NOTE — PROGRESS NOTE ADULT - ASSESSMENT
73 year old female with left OE 73 year old female with a hx HTN, DM, CVA, admitted for respiratory distress, prolonged intubation on vent. Patient was previously evaluated by ENT for left OE. Had a wick placed and was treated with IV abx and drops. ENT signed off several weeks ago after improvement. Now having left ear drainage and excoriation to left Auricle. Trach + vent dependent.

## 2023-11-03 NOTE — PROGRESS NOTE ADULT - ASSESSMENT
73 f with DM, HTN, CKD, breast CA, latent TB (treated first with INH then had rash and switched to rifampin), MSSA bacteremia, c-diff, respiratory arrest and cardiac arrest (2018), s/p trach/PEG then removed, CVA with residual weakness, aspiration PNA, 1/4 sputums had MAC 7/2023, admitted 8/11 with respiratory failure no fever or WBC but was intubated and then spiked  blood cx negative, sputum cx with MSSA  developed rash and renal failure after cefepime, was on HD then discontinued became uremic and now again on HD  head MRI 8/17 with acute cerebellar infarct  had renal failure, AIN? family declined renal biopsy started on prednisone  s/p trach 9/1 and BAL with MSSA   ET cx with ESBL and MSSA  blood cx 9/1: MSSA with hypotension  repeat negative 9/4, 9/8, TTE no veg s/p 4 weeks of vanco/dapto through 10/2  L ear edema and otitis externa, s/p a long course of ana cristina, the last cx showed candida and ENT stated it could be fungal (saw black spores?) so was also given 7 days of fluconazole  pt had a rash again, unclear what caused it, fluconazole  still with intermittent fevers and then sputum cx with carbapenem resistant pseudomonas, s/p a course of polymixin  no improvement in mental status and ?seizure as well, neuro following  had drop in Hgb s/p transfusion and then had again febrile 10/15-10.17 with leukocytosis, rash and eosinophilia, sputum growing pseudomonas and proteus  (both sensitive to aztreonam) ?VAP vs a transfusion reaction  hypotension during HD,  transferred to MICU, now off pressors, shiley was removed in view of intermittent fever, now plan for new shiley, c-diff and GI PCR negative  pt again febrile to 104.1 on 11/2, WBC 13 and the L ear again with otitis externa     *  vanco and aztreonam, started 11/2 but blood cx negative, discontinue vanco and continue aztreonam for now  * f/u the final blood, sputum and urine cx  * if remains febrile or worsening status will need chest/abd/pelvis Ct  * monitor CBC/diff and CMP          The above assessment and plan was discussed with MICU    Yumiko Conner MD  contact on teams  After 5pm and on weekends call [phone]

## 2023-11-03 NOTE — PROGRESS NOTE ADULT - SUBJECTIVE AND OBJECTIVE BOX
ENT ISSUE/POD:    HPI:  73 year old female with a hx HTN, DM, CVA, admitted for respiratory distress, prolonged intubation on vent. Patient was previously evaluated by ENT for left OE. Had a wick placed and was treated with IV abx and drops. ENT signed off several weeks ago after improvement. Now having left ear drainage and excoriation to left Auricle. Trach + vent dependent. Receiving dialysis at the time of encounter               PAST MEDICAL & SURGICAL HISTORY:  Breast CA      Diabetes      Stroke      Cardiac arrest      HTN (hypertension)      H/O mastectomy, bilateral        Allergies    isoniazid (Rash)  nafcillin (Unknown)  hydrALAZINE (Rash)  vitamin E (Short breath; Urticaria; Hives)  doxycycline (Rash)  cefepime (Rash)  NIFEdipine (Urticaria; Hives)  Zosyn (Rash)    Intolerances      MEDICATIONS  (STANDING):  albuterol    0.083% 2.5 milliGRAM(s) Nebulizer every 6 hours  apixaban 5 milliGRAM(s) Oral every 12 hours  artificial tears (preservative free) Ophthalmic Solution 1 Drop(s) Both EYES every 4 hours  atorvastatin 10 milliGRAM(s) Oral at bedtime  aztreonam  IVPB 2000 milliGRAM(s) IV Intermittent <User Schedule>  chlorhexidine 0.12% Liquid 15 milliLiter(s) Oral Mucosa every 12 hours  chlorhexidine 2% Cloths 1 Application(s) Topical daily  ciprofloxacin/hydrocortisone Suspension Otic 4 Drop(s) Left Ear two times a day  dextrose 5%. 1000 milliLiter(s) (100 mL/Hr) IV Continuous <Continuous>  dextrose 5%. 1000 milliLiter(s) (50 mL/Hr) IV Continuous <Continuous>  dextrose 50% Injectable 12.5 Gram(s) IV Push once  dextrose 50% Injectable 25 Gram(s) IV Push once  dextrose 50% Injectable 25 Gram(s) IV Push once  dextrose 50% Injectable 25 Gram(s) IV Push once  droxidopa 600 milliGRAM(s) Oral every 8 hours  epoetin odalis (EPOGEN) Injectable 05785 Unit(s) IV Push <User Schedule>  ferrous    sulfate Liquid 300 milliGRAM(s) Oral daily  glucagon  Injectable 1 milliGRAM(s) IntraMuscular once  insulin lispro (ADMELOG) corrective regimen sliding scale   SubCutaneous every 6 hours  insulin NPH human recombinant 6 Unit(s) SubCutaneous every 6 hours  levETIRAcetam  Solution 1000 milliGRAM(s) Oral daily  levETIRAcetam  Solution 250 milliGRAM(s) Oral <User Schedule>  midodrine. 40 milliGRAM(s) Oral <User Schedule>  norepinephrine Infusion 0.05 MICROgram(s)/kG/Min (6.23 mL/Hr) IV Continuous <Continuous>  pantoprazole  Injectable 40 milliGRAM(s) IV Push two times a day  petrolatum Ophthalmic Ointment 1 Application(s) Both EYES four times a day  sodium chloride 1 Gram(s) Oral two times a day    MEDICATIONS  (PRN):  acetaminophen   Oral Liquid .. 650 milliGRAM(s) Oral every 6 hours PRN Temp greater or equal to 38C (100.4F), Mild Pain (1 - 3)  dextrose Oral Gel 15 Gram(s) Oral once PRN Blood Glucose LESS THAN 70 milliGRAM(s)/deciliter  diphenhydrAMINE Elixir 50 milliGRAM(s) Oral once PRN Rash and/or Itching  droxidopa 200 milliGRAM(s) Oral <User Schedule> PRN Prior to hemodialysis  midodrine. 20 milliGRAM(s) Oral <User Schedule> PRN 1 Hour Pre HD  sodium chloride 0.9% Bolus. 250 milliLiter(s) IV Bolus every 5 minutes PRN SBP LESS THAN or EQUAL to 90 mmHg  sodium chloride 0.9% lock flush 10 milliLiter(s) IV Push every 1 hour PRN Pre/post blood products, medications, blood draw, and to maintain line patency           ROS:   ENT: all negative except as noted in HPI   Pulm: denies SOB, cough, hemoptysis  Neuro: denies numbness/tingling, loss of sensation  Endo: denies heat/cold intolerance, excessive sweating      Vital Signs Last 24 Hrs  T(C): 35.7 (03 Nov 2023 06:30), Max: 40.3 (02 Nov 2023 16:00)  T(F): 96.2 (03 Nov 2023 06:30), Max: 104.6 (02 Nov 2023 16:00)  HR: 125 (03 Nov 2023 08:34) (14 - 128)  RR: 31 (03 Nov 2023 08:00) (16 - 31)  SpO2: 100% (03 Nov 2023 08:34) (94% - 100%)    Parameters below as of 03 Nov 2023 08:34  Patient On (Oxygen Delivery Method): ventilator                              7.6    13.71 )-----------( 464      ( 03 Nov 2023 00:10 )             26.2    11-03    137  |  102  |  27<H>  ----------------------------<  101<H>  4.5   |  26  |  1.28    Ca    8.6      03 Nov 2023 00:10  Phos  5.2     11-03  Mg     2.30     11-03    TPro  6.6  /  Alb  2.1<L>  /  TBili  <0.2  /  DBili  x   /  AST  23  /  ALT  10  /  AlkPhos  246<H>  11-03   PTT - ( 03 Nov 2023 00:10 )  PTT:32.9 sec    PHYSICAL EXAM:  Skin: No rashes, bruises, or lesions  Head: Normocephalic, Atraumatic  Face: no edema, erythema, or fluctuance.   Eyes: no scleral injection  Ears: Right - ear canal clear, TM intact without effusion or erythema. No evidence of any fluid drainage.           LEFT   auricle with excoriation/serous fluid and blood. No active bleeding.  ear canal with serous drainage. TM perforated. No mastoid tenderness, erythema, or ear bulging  Nose: Nares bilaterally patent, no discharge  Mouth: No Stridor / Drooling / Trismus  Neck: trach   Lymphatic: No lymphadenopathy  Resp: Trach + vent

## 2023-11-03 NOTE — PROGRESS NOTE ADULT - SUBJECTIVE AND OBJECTIVE BOX
MICU Progress Note    INTERVAL HPI/OVERNIGHT EVENTS:    SUBJECTIVE: Patient seen and examined at bedside.     CONSTITUTIONAL: No weakness, fevers or chills  EYES/ENT: No visual changes;  No vertigo or throat pain   NECK: No pain or stiffness  RESPIRATORY: No cough, wheezing, hemoptysis; No shortness of breath  CARDIOVASCULAR: No chest pain or palpitations  GASTROINTESTINAL: No abdominal or epigastric pain. No nausea, vomiting, or hematemesis; No diarrhea or constipation. No melena or hematochezia.  GENITOURINARY: No dysuria, frequency or hematuria  NEUROLOGICAL: No numbness or weakness  SKIN: No itching, rashes    OBJECTIVE:    VITAL SIGNS:  ICU Vital Signs Last 24 Hrs  T(C): 35.7 (03 Nov 2023 06:30), Max: 40.3 (02 Nov 2023 16:00)  T(F): 96.2 (03 Nov 2023 06:30), Max: 104.6 (02 Nov 2023 16:00)  HR: 112 (03 Nov 2023 06:52) (14 - 122)  BP: --  BP(mean): --  ABP: 89/32 (03 Nov 2023 06:52) (89/32 - 149/59)  ABP(mean): 54 (03 Nov 2023 06:52) (54 - 99)  RR: 24 (03 Nov 2023 06:52) (16 - 30)  SpO2: 97% (03 Nov 2023 06:52) (94% - 100%)    O2 Parameters below as of 03 Nov 2023 06:30  Patient On (Oxygen Delivery Method): ventilator          Mode: AC/ CMV (Assist Control/ Continuous Mandatory Ventilation), RR (machine): 24, FiO2: 50, PEEP: 8, ITime: 0.8, MAP: 20, PC: 35, PIP: 43    11-02 @ 07:01  -  11-03 @ 07:00  --------------------------------------------------------  IN: 1200 mL / OUT: 0 mL / NET: 1200 mL      CAPILLARY BLOOD GLUCOSE      POCT Blood Glucose.: 110 mg/dL (03 Nov 2023 05:15)      PHYSICAL EXAM:    General: NAD  HEENT: NC/AT; PERRL, clear conjunctiva  Neck: supple  Respiratory: CTA b/l  Cardiovascular: +S1/S2; RRR  Abdomen: soft, NT/ND; +BS x4  Extremities: WWP, 2+ peripheral pulses b/l; no LE edema  Skin: normal color and turgor; no rash  Neurological:    MEDICATIONS:  MEDICATIONS  (STANDING):  albuterol    0.083% 2.5 milliGRAM(s) Nebulizer every 6 hours  apixaban 5 milliGRAM(s) Oral every 12 hours  artificial tears (preservative free) Ophthalmic Solution 1 Drop(s) Both EYES every 4 hours  atorvastatin 10 milliGRAM(s) Oral at bedtime  aztreonam  IVPB 2000 milliGRAM(s) IV Intermittent <User Schedule>  chlorhexidine 0.12% Liquid 15 milliLiter(s) Oral Mucosa every 12 hours  chlorhexidine 2% Cloths 1 Application(s) Topical daily  ciprofloxacin/hydrocortisone Suspension Otic 4 Drop(s) Left Ear two times a day  dextrose 5%. 1000 milliLiter(s) (50 mL/Hr) IV Continuous <Continuous>  dextrose 5%. 1000 milliLiter(s) (100 mL/Hr) IV Continuous <Continuous>  dextrose 50% Injectable 12.5 Gram(s) IV Push once  dextrose 50% Injectable 25 Gram(s) IV Push once  dextrose 50% Injectable 25 Gram(s) IV Push once  dextrose 50% Injectable 25 Gram(s) IV Push once  droxidopa 600 milliGRAM(s) Oral every 8 hours  epoetin odalis (EPOGEN) Injectable 35041 Unit(s) IV Push <User Schedule>  ferrous    sulfate Liquid 300 milliGRAM(s) Oral daily  glucagon  Injectable 1 milliGRAM(s) IntraMuscular once  insulin lispro (ADMELOG) corrective regimen sliding scale   SubCutaneous every 6 hours  insulin NPH human recombinant 6 Unit(s) SubCutaneous every 6 hours  levETIRAcetam  Solution 1000 milliGRAM(s) Oral daily  levETIRAcetam  Solution 250 milliGRAM(s) Oral <User Schedule>  midodrine. 40 milliGRAM(s) Oral <User Schedule>  norepinephrine Infusion 0.05 MICROgram(s)/kG/Min (6.23 mL/Hr) IV Continuous <Continuous>  pantoprazole  Injectable 40 milliGRAM(s) IV Push two times a day  petrolatum Ophthalmic Ointment 1 Application(s) Both EYES four times a day  sodium chloride 1 Gram(s) Oral two times a day    MEDICATIONS  (PRN):  acetaminophen   Oral Liquid .. 650 milliGRAM(s) Oral every 6 hours PRN Temp greater or equal to 38C (100.4F), Mild Pain (1 - 3)  dextrose Oral Gel 15 Gram(s) Oral once PRN Blood Glucose LESS THAN 70 milliGRAM(s)/deciliter  diphenhydrAMINE Elixir 50 milliGRAM(s) Oral once PRN Rash and/or Itching  droxidopa 200 milliGRAM(s) Oral <User Schedule> PRN Prior to hemodialysis  midodrine. 20 milliGRAM(s) Oral <User Schedule> PRN 1 Hour Pre HD  sodium chloride 0.9% Bolus. 250 milliLiter(s) IV Bolus every 5 minutes PRN SBP LESS THAN or EQUAL to 90 mmHg  sodium chloride 0.9% lock flush 10 milliLiter(s) IV Push every 1 hour PRN Pre/post blood products, medications, blood draw, and to maintain line patency      ALLERGIES:  Allergies    isoniazid (Rash)  nafcillin (Unknown)  hydrALAZINE (Rash)  vitamin E (Short breath; Urticaria; Hives)  doxycycline (Rash)  cefepime (Rash)  NIFEdipine (Urticaria; Hives)  Zosyn (Rash)    Intolerances        LABS:                        7.6    13.71 )-----------( 464      ( 03 Nov 2023 00:10 )             26.2     11-03    137  |  102  |  27<H>  ----------------------------<  101<H>  4.5   |  26  |  1.28    Ca    8.6      03 Nov 2023 00:10  Phos  5.2     11-03  Mg     2.30     11-03    TPro  6.6  /  Alb  2.1<L>  /  TBili  <0.2  /  DBili  x   /  AST  23  /  ALT  10  /  AlkPhos  246<H>  11-03    PTT - ( 03 Nov 2023 00:10 )  PTT:32.9 sec  Urinalysis Basic - ( 03 Nov 2023 00:10 )    Color: x / Appearance: x / SG: x / pH: x  Gluc: 101 mg/dL / Ketone: x  / Bili: x / Urobili: x   Blood: x / Protein: x / Nitrite: x   Leuk Esterase: x / RBC: x / WBC x   Sq Epi: x / Non Sq Epi: x / Bacteria: x        RADIOLOGY & ADDITIONAL TESTS: Reviewed. MICU Progress Note    INTERVAL HPI/OVERNIGHT EVENTS: ON respiratory acidosis w/ incomplete metabolic compensation. RR, FiO2 increased.     SUBJECTIVE: Patient seen and examined at bedside.     ROS: unable to assess    OBJECTIVE:    VITAL SIGNS:  ICU Vital Signs Last 24 Hrs  T(C): 35.7 (03 Nov 2023 06:30), Max: 40.3 (02 Nov 2023 16:00)  T(F): 96.2 (03 Nov 2023 06:30), Max: 104.6 (02 Nov 2023 16:00)  HR: 112 (03 Nov 2023 06:52) (14 - 122)  BP: --  BP(mean): --  ABP: 89/32 (03 Nov 2023 06:52) (89/32 - 149/59)  ABP(mean): 54 (03 Nov 2023 06:52) (54 - 99)  RR: 24 (03 Nov 2023 06:52) (16 - 30)  SpO2: 97% (03 Nov 2023 06:52) (94% - 100%)    O2 Parameters below as of 03 Nov 2023 06:30  Patient On (Oxygen Delivery Method): ventilator          Mode: AC/ CMV (Assist Control/ Continuous Mandatory Ventilation), RR (machine): 24, FiO2: 50, PEEP: 8, ITime: 0.8, MAP: 20, PC: 35, PIP: 43    11-02 @ 07:01  -  11-03 @ 07:00  --------------------------------------------------------  IN: 1200 mL / OUT: 0 mL / NET: 1200 mL      CAPILLARY BLOOD GLUCOSE      POCT Blood Glucose.: 110 mg/dL (03 Nov 2023 05:15)      PHYSICAL EXAM:  General: NAD  HEENT: NC/AT; PERRL, clear conjunctiva; L ear w/ grainy white residue, no bleeding appreciated  Neck: supple  Respiratory: CTA b/l  Cardiovascular: +S1/S2; RRR  Abdomen: soft, distended, warm to touch w/ area of erythema and induration RLQ  Extremities: WWP, 2+ peripheral pulses b/l; diffuse anasarca  Skin: normal color and turgor; no rash  Neurological: AO*0; rhythmic R facial and arm twitching      MEDICATIONS:  MEDICATIONS  (STANDING):  albuterol    0.083% 2.5 milliGRAM(s) Nebulizer every 6 hours  apixaban 5 milliGRAM(s) Oral every 12 hours  artificial tears (preservative free) Ophthalmic Solution 1 Drop(s) Both EYES every 4 hours  atorvastatin 10 milliGRAM(s) Oral at bedtime  aztreonam  IVPB 2000 milliGRAM(s) IV Intermittent <User Schedule>  chlorhexidine 0.12% Liquid 15 milliLiter(s) Oral Mucosa every 12 hours  chlorhexidine 2% Cloths 1 Application(s) Topical daily  ciprofloxacin/hydrocortisone Suspension Otic 4 Drop(s) Left Ear two times a day  dextrose 5%. 1000 milliLiter(s) (50 mL/Hr) IV Continuous <Continuous>  dextrose 5%. 1000 milliLiter(s) (100 mL/Hr) IV Continuous <Continuous>  dextrose 50% Injectable 12.5 Gram(s) IV Push once  dextrose 50% Injectable 25 Gram(s) IV Push once  dextrose 50% Injectable 25 Gram(s) IV Push once  dextrose 50% Injectable 25 Gram(s) IV Push once  droxidopa 600 milliGRAM(s) Oral every 8 hours  epoetin odalis (EPOGEN) Injectable 58079 Unit(s) IV Push <User Schedule>  ferrous    sulfate Liquid 300 milliGRAM(s) Oral daily  glucagon  Injectable 1 milliGRAM(s) IntraMuscular once  insulin lispro (ADMELOG) corrective regimen sliding scale   SubCutaneous every 6 hours  insulin NPH human recombinant 6 Unit(s) SubCutaneous every 6 hours  levETIRAcetam  Solution 1000 milliGRAM(s) Oral daily  levETIRAcetam  Solution 250 milliGRAM(s) Oral <User Schedule>  midodrine. 40 milliGRAM(s) Oral <User Schedule>  norepinephrine Infusion 0.05 MICROgram(s)/kG/Min (6.23 mL/Hr) IV Continuous <Continuous>  pantoprazole  Injectable 40 milliGRAM(s) IV Push two times a day  petrolatum Ophthalmic Ointment 1 Application(s) Both EYES four times a day  sodium chloride 1 Gram(s) Oral two times a day    MEDICATIONS  (PRN):  acetaminophen   Oral Liquid .. 650 milliGRAM(s) Oral every 6 hours PRN Temp greater or equal to 38C (100.4F), Mild Pain (1 - 3)  dextrose Oral Gel 15 Gram(s) Oral once PRN Blood Glucose LESS THAN 70 milliGRAM(s)/deciliter  diphenhydrAMINE Elixir 50 milliGRAM(s) Oral once PRN Rash and/or Itching  droxidopa 200 milliGRAM(s) Oral <User Schedule> PRN Prior to hemodialysis  midodrine. 20 milliGRAM(s) Oral <User Schedule> PRN 1 Hour Pre HD  sodium chloride 0.9% Bolus. 250 milliLiter(s) IV Bolus every 5 minutes PRN SBP LESS THAN or EQUAL to 90 mmHg  sodium chloride 0.9% lock flush 10 milliLiter(s) IV Push every 1 hour PRN Pre/post blood products, medications, blood draw, and to maintain line patency      ALLERGIES:  Allergies    isoniazid (Rash)  nafcillin (Unknown)  hydrALAZINE (Rash)  vitamin E (Short breath; Urticaria; Hives)  doxycycline (Rash)  cefepime (Rash)  NIFEdipine (Urticaria; Hives)  Zosyn (Rash)    Intolerances        LABS:                        7.6    13.71 )-----------( 464      ( 03 Nov 2023 00:10 )             26.2     11-03    137  |  102  |  27<H>  ----------------------------<  101<H>  4.5   |  26  |  1.28    Ca    8.6      03 Nov 2023 00:10  Phos  5.2     11-03  Mg     2.30     11-03    TPro  6.6  /  Alb  2.1<L>  /  TBili  <0.2  /  DBili  x   /  AST  23  /  ALT  10  /  AlkPhos  246<H>  11-03    PTT - ( 03 Nov 2023 00:10 )  PTT:32.9 sec  Urinalysis Basic - ( 03 Nov 2023 00:10 )    Color: x / Appearance: x / SG: x / pH: x  Gluc: 101 mg/dL / Ketone: x  / Bili: x / Urobili: x   Blood: x / Protein: x / Nitrite: x   Leuk Esterase: x / RBC: x / WBC x   Sq Epi: x / Non Sq Epi: x / Bacteria: x        RADIOLOGY & ADDITIONAL TESTS: Reviewed.

## 2023-11-03 NOTE — PROGRESS NOTE ADULT - SUBJECTIVE AND OBJECTIVE BOX
Subjective: Patient seen and examined. No new events except as noted.   remains in ICu   respiratory acidosis w/ incomplete metabolic compensation. RR, FiO2 increased.       REVIEW OF SYSTEMS:    Unable to obtain     MEDICATIONS:  MEDICATIONS  (STANDING):  albuterol    0.083% 2.5 milliGRAM(s) Nebulizer every 6 hours  apixaban 5 milliGRAM(s) Oral every 12 hours  artificial tears (preservative free) Ophthalmic Solution 1 Drop(s) Both EYES every 4 hours  atorvastatin 10 milliGRAM(s) Oral at bedtime  aztreonam  IVPB 2000 milliGRAM(s) IV Intermittent <User Schedule>  chlorhexidine 0.12% Liquid 15 milliLiter(s) Oral Mucosa every 12 hours  chlorhexidine 2% Cloths 1 Application(s) Topical daily  ciprofloxacin/hydrocortisone Suspension Otic 4 Drop(s) Left Ear two times a day  dextrose 5%. 1000 milliLiter(s) (50 mL/Hr) IV Continuous <Continuous>  dextrose 5%. 1000 milliLiter(s) (100 mL/Hr) IV Continuous <Continuous>  dextrose 50% Injectable 12.5 Gram(s) IV Push once  dextrose 50% Injectable 25 Gram(s) IV Push once  dextrose 50% Injectable 25 Gram(s) IV Push once  dextrose 50% Injectable 25 Gram(s) IV Push once  droxidopa 600 milliGRAM(s) Oral every 8 hours  epoetin odalis (EPOGEN) Injectable 10727 Unit(s) IV Push <User Schedule>  ferrous    sulfate Liquid 300 milliGRAM(s) Oral daily  glucagon  Injectable 1 milliGRAM(s) IntraMuscular once  insulin lispro (ADMELOG) corrective regimen sliding scale   SubCutaneous every 6 hours  insulin NPH human recombinant 6 Unit(s) SubCutaneous every 6 hours  levETIRAcetam  Solution 1000 milliGRAM(s) Oral daily  levETIRAcetam  Solution 250 milliGRAM(s) Oral <User Schedule>  midodrine. 40 milliGRAM(s) Oral <User Schedule>  norepinephrine Infusion 0.05 MICROgram(s)/kG/Min (6.23 mL/Hr) IV Continuous <Continuous>  pantoprazole  Injectable 40 milliGRAM(s) IV Push two times a day  petrolatum Ophthalmic Ointment 1 Application(s) Both EYES four times a day  sodium chloride 1 Gram(s) Oral two times a day      PHYSICAL EXAM:  T(C): 35.6 (11-03-23 @ 09:59), Max: 40.3 (11-02-23 @ 16:00)  HR: 133 (11-03-23 @ 10:00) (14 - 134)  BP: --  RR: 24 (11-03-23 @ 10:00) (16 - 31)  SpO2: 100% (11-03-23 @ 10:00) (94% - 100%)  Wt(kg): --  I&O's Summary    02 Nov 2023 07:01  -  03 Nov 2023 07:00  --------------------------------------------------------  IN: 1200 mL / OUT: 0 mL / NET: 1200 mL    03 Nov 2023 07:01  -  03 Nov 2023 10:48  --------------------------------------------------------  IN: 500 mL / OUT: 2500 mL / NET: -2000 mL            Appearance: NAD, +trach  HEENT: dry oral mucosa  Lymphatic: No lymphadenopathy  Cardiovascular: Normal S1 S2, No JVD, No murmurs, No edema  Respiratory: Decreased BS, + trach to vent	  Neuro: opens eyes  Gastrointestinal: Soft, Non-tender, + BS, + NGT  Skin: No rashes, No ecchymoses, No cyanosis	  Extremities: No strength/ROM 2/2 sedation, + BL LE edema  Vascular: Peripheral pulses palpable 2+ bilaterally  Anasarca   Mode: AC/ CMV (Assist Control/ Continuous Mandatory Ventilation), RR (machine): 24, FiO2: 50, PEEP: 8, ITime: 0.8, MAP: 20, PC: 35, PIP: 43          LABS:    CARDIAC MARKERS:                                7.6    13.71 )-----------( 464      ( 03 Nov 2023 00:10 )             26.2     11-03    137  |  102  |  27<H>  ----------------------------<  101<H>  4.5   |  26  |  1.28    Ca    8.6      03 Nov 2023 00:10  Phos  5.2     11-03  Mg     2.30     11-03    TPro  6.6  /  Alb  2.1<L>  /  TBili  <0.2  /  DBili  x   /  AST  23  /  ALT  10  /  AlkPhos  246<H>  11-03    Blood Gas Profile - Arterial (11.03.23 @ 00:10)   pH, Arterial: 7.25  pCO2, Arterial: 68 mmHg  pO2, Arterial: 130 mmHg  HCO3, Arterial: 30 mmol/L  Base Excess, Arterial: 1.6 mmol/L  Oxygen Saturation, Arterial: 99.1 %  Total CO2, Arterial: 32 mmol/L        TELEMETRY: SR ST 	    ECG:  	  RADIOLOGY:   DIAGNOSTIC TESTING:  [ ] Echocardiogram:  [ ]  Catheterization:  [ ] Stress Test:    OTHER:

## 2023-11-03 NOTE — PROGRESS NOTE ADULT - SUBJECTIVE AND OBJECTIVE BOX
NEPHROLOGY     Patient seen and examined with spouse at bedside, pt trach to vent, tolerated HD this morning and removed 2L.     MEDICATIONS  (STANDING):  albuterol    0.083% 2.5 milliGRAM(s) Nebulizer every 6 hours  apixaban 5 milliGRAM(s) Oral every 12 hours  artificial tears (preservative free) Ophthalmic Solution 1 Drop(s) Both EYES every 4 hours  atorvastatin 10 milliGRAM(s) Oral at bedtime  aztreonam  IVPB 2000 milliGRAM(s) IV Intermittent <User Schedule>  chlorhexidine 0.12% Liquid 15 milliLiter(s) Oral Mucosa every 12 hours  chlorhexidine 2% Cloths 1 Application(s) Topical daily  ciprofloxacin/hydrocortisone Suspension Otic 4 Drop(s) Left Ear two times a day  dextrose 5%. 1000 milliLiter(s) (50 mL/Hr) IV Continuous <Continuous>  dextrose 5%. 1000 milliLiter(s) (100 mL/Hr) IV Continuous <Continuous>  dextrose 50% Injectable 12.5 Gram(s) IV Push once  dextrose 50% Injectable 25 Gram(s) IV Push once  dextrose 50% Injectable 25 Gram(s) IV Push once  dextrose 50% Injectable 25 Gram(s) IV Push once  droxidopa 600 milliGRAM(s) Oral every 8 hours  epoetin odalis (EPOGEN) Injectable 22007 Unit(s) IV Push <User Schedule>  ferrous    sulfate Liquid 300 milliGRAM(s) Oral daily  glucagon  Injectable 1 milliGRAM(s) IntraMuscular once  insulin lispro (ADMELOG) corrective regimen sliding scale   SubCutaneous every 6 hours  insulin NPH human recombinant 6 Unit(s) SubCutaneous every 6 hours  levETIRAcetam  Solution 1000 milliGRAM(s) Oral daily  levETIRAcetam  Solution 250 milliGRAM(s) Oral <User Schedule>  midodrine. 40 milliGRAM(s) Oral <User Schedule>  norepinephrine Infusion 0.05 MICROgram(s)/kG/Min (6.23 mL/Hr) IV Continuous <Continuous>  pantoprazole  Injectable 40 milliGRAM(s) IV Push two times a day  petrolatum Ophthalmic Ointment 1 Application(s) Both EYES four times a day  sodium chloride 1 Gram(s) Oral two times a day    VITALS:  T(C): , Max: 40.3 (11-02-23 @ 16:00)  T(F): , Max: 104.6 (11-02-23 @ 16:00)  RR: 22 (11-03-23 @ 12:00)  SpO2: 100% (11-03-23 @ 12:00)    PHYSICAL EXAM:  Constitutional: critically ill, trach to vent   HEENT: + NGT, + tracheostomy   Respiratory: coarse BS  Cardiovascular: tachy   Gastrointestinal: BS+, soft, NT/ND  Extremities: ++ peripheral edema  Neurological: UTO  : No Wheeler  Skin: No rashes  Access: [facility]     LABS:                        7.6    13.71 )-----------( 464      ( 03 Nov 2023 00:10 )             26.2     11-03    137  |  102  |  27<H>  ----------------------------<  101<H>  4.5   |  26  |  1.28    Ca    8.6      03 Nov 2023 00:10  Phos  5.2     11-03  Mg     2.30     11-03    TPro  6.6  /  Alb  2.1<L>  /  TBili  <0.2  /  DBili  x   /  AST  23  /  ALT  10  /  AlkPhos  246<H>  11-03

## 2023-11-03 NOTE — PROGRESS NOTE ADULT - SUBJECTIVE AND OBJECTIVE BOX
S:  Pt seen and examined.      Medications: MEDICATIONS  (STANDING):  albuterol    0.083% 2.5 milliGRAM(s) Nebulizer every 6 hours  apixaban 5 milliGRAM(s) Oral every 12 hours  artificial tears (preservative free) Ophthalmic Solution 1 Drop(s) Both EYES every 4 hours  atorvastatin 10 milliGRAM(s) Oral at bedtime  aztreonam  IVPB 2000 milliGRAM(s) IV Intermittent <User Schedule>  chlorhexidine 0.12% Liquid 15 milliLiter(s) Oral Mucosa every 12 hours  chlorhexidine 2% Cloths 1 Application(s) Topical daily  ciprofloxacin/hydrocortisone Suspension Otic 4 Drop(s) Left Ear two times a day  dextrose 5%. 1000 milliLiter(s) (50 mL/Hr) IV Continuous <Continuous>  dextrose 5%. 1000 milliLiter(s) (100 mL/Hr) IV Continuous <Continuous>  dextrose 50% Injectable 12.5 Gram(s) IV Push once  dextrose 50% Injectable 25 Gram(s) IV Push once  dextrose 50% Injectable 25 Gram(s) IV Push once  dextrose 50% Injectable 25 Gram(s) IV Push once  droxidopa 600 milliGRAM(s) Oral every 8 hours  epoetin odalis (EPOGEN) Injectable 94906 Unit(s) IV Push <User Schedule>  ferrous    sulfate Liquid 300 milliGRAM(s) Oral daily  glucagon  Injectable 1 milliGRAM(s) IntraMuscular once  insulin lispro (ADMELOG) corrective regimen sliding scale   SubCutaneous every 6 hours  insulin NPH human recombinant 6 Unit(s) SubCutaneous every 6 hours  levETIRAcetam  Solution 1000 milliGRAM(s) Oral daily  levETIRAcetam  Solution 250 milliGRAM(s) Oral <User Schedule>  midodrine. 40 milliGRAM(s) Oral <User Schedule>  norepinephrine Infusion 0.05 MICROgram(s)/kG/Min (6.23 mL/Hr) IV Continuous <Continuous>  pantoprazole  Injectable 40 milliGRAM(s) IV Push two times a day  petrolatum Ophthalmic Ointment 1 Application(s) Both EYES four times a day  sodium chloride 1 Gram(s) Oral two times a day    MEDICATIONS  (PRN):  acetaminophen   Oral Liquid .. 650 milliGRAM(s) Oral every 6 hours PRN Temp greater or equal to 38C (100.4F), Mild Pain (1 - 3)  dextrose Oral Gel 15 Gram(s) Oral once PRN Blood Glucose LESS THAN 70 milliGRAM(s)/deciliter  diphenhydrAMINE Elixir 50 milliGRAM(s) Oral once PRN Rash and/or Itching  droxidopa 200 milliGRAM(s) Oral <User Schedule> PRN Prior to hemodialysis  midodrine. 20 milliGRAM(s) Oral <User Schedule> PRN 1 Hour Pre HD  sodium chloride 0.9% Bolus. 250 milliLiter(s) IV Bolus every 5 minutes PRN SBP LESS THAN or EQUAL to 90 mmHg  sodium chloride 0.9% lock flush 10 milliLiter(s) IV Push every 1 hour PRN Pre/post blood products, medications, blood draw, and to maintain line patency       Vitals:  Vital Signs Last 24 Hrs  T(C): 35.6 (03 Nov 2023 09:59), Max: 40.3 (02 Nov 2023 16:00)  T(F): 96 (03 Nov 2023 09:59), Max: 104.6 (02 Nov 2023 16:00)  HR: 119 (03 Nov 2023 11:39) (14 - 134)  BP: --  BP(mean): --  RR: 24 (03 Nov 2023 11:10) (16 - 31)  SpO2: 100% (03 Nov 2023 11:39) (94% - 100%)    Parameters below as of 03 Nov 2023 10:00  Patient On (Oxygen Delivery Method): ventilator, AC 24/40/8 PIP  O2 Flow (L/min): 40          Neurological Exam:  Mental Status: Eyes closed, minimal spont eye opening off sedation. Does not follow commands,  + trach to vent and NGT   Cranial Nerves: PERRL slightly sluggish, L subconjunctival hemorrhage improved. R facial twitching noted by mouth lessened today- ? suppressible. occasional head dystonia  Motor: Moves upper extremities spontaneously R >L, tremor R > L  no movement noted in lowers  Sensation: WD to noxious x4    I personally reviewed the below data/images/labs:    LABS:                          7.6    13.71 )-----------( 464      ( 03 Nov 2023 00:10 )             26.2     11-03    137  |  102  |  27<H>  ----------------------------<  101<H>  4.5   |  26  |  1.28    Ca    8.6      03 Nov 2023 00:10  Phos  5.2     11-03  Mg     2.30     11-03    TPro  6.6  /  Alb  2.1<L>  /  TBili  <0.2  /  DBili  x   /  AST  23  /  ALT  10  /  AlkPhos  246<H>  11-03    LIVER FUNCTIONS - ( 03 Nov 2023 00:10 )  Alb: 2.1 g/dL / Pro: 6.6 g/dL / ALK PHOS: 246 U/L / ALT: 10 U/L / AST: 23 U/L / GGT: x           PTT - ( 03 Nov 2023 00:10 )  PTT:32.9 sec  Urinalysis Basic - ( 03 Nov 2023 00:10 )    Color: x / Appearance: x / SG: x / pH: x  Gluc: 101 mg/dL / Ketone: x  / Bili: x / Urobili: x   Blood: x / Protein: x / Nitrite: x   Leuk Esterase: x / RBC: x / WBC x   Sq Epi: x / Non Sq Epi: x / Bacteria: x        < from: CT Head No Cont (08.15.23 @ 17:20) >    ACC: [id number] EXAM:  CT BRAIN   ORDERED BY: MINAL SAM     PROCEDURE DATE:  08/15/2023          INTERPRETATION:  Clinical indication: Change in neuro exam.    Multiple axial sections were performed from base to vertex without   contrast enhancement. Coronal and sagittal reconstructions were   reformatted well.    This exam is compared prior head CT performed on August 11, 2023    Parenchymal volume loss and chronic microvessel ischemic changes are   again seen.    Abnormal low-attenuation involving the left occipital cortical   subcortical region is again seen. 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    No evidence of acute hemorrhage mass or mass effect is seen.    Evaluation of the osseous structures with appropriate window demonstrates   sclerotic changes about the left mastoid region which appears stable.   Opacification left middle ear region is again seen.    Patient is status post bilateral cataract surgery.    Impression: Stable exam.    < end of copied text >    vEEG:    Clinical Impression:  - Severe diffuse non-specific cerebral dysfunction  - There were no epileptiform abnormalities or seizures recorded.        CTH 8/11:    VENTRICLES AND SULCI: Age-appropriate involutional change  INTRA-AXIAL:  Old left PCA infarct as seen on the prior unchanged.   Microvascular ischemic changes involving the periventricular and   subcortical white matter as seen previously  EXTRA-AXIAL:  No mass or collection is seen.  VISUALIZED SINUSES:  Clear.  VISUALIZED MASTOIDS: Left mastoid sclerosis  CALVARIUM: Infiltrative appearance tothe calvarium may be indicative of   marrow infiltration on the basis of patient's known diagnosis of breast   cancer. MISCELLANEOUS:  None.    IMPRESSION:  No significant interval change compared with 7/17/2023 in   left PCA infarct which is old. Microvascular ischemic changes involving   the periventricular and subcortical white matter as seen   previously.Questionable lesions at the level of the calvarium related to   possible breast CA. Clinical correlation recommended.    --- End of Report ---      ct< from: CT Head No Cont (09.26.23 @ 21:02) >  IMPRESSION: Vague questionable areas of hypoattenuation within the   bilateral cerebellar hemispheres which may be compatible with   acute/subacute ischemia. Recommend further evaluation with a brain MRI   study, provided there are no MRI contraindications.    No acute intracranial hemorrhage.    Previously seen questionable vague wedge-shaped area of hypoattenuation   in the left parietal lobe with artifactual secondary to motion and volume   averaging with a prominent sulcus.    Chronic left occipital lobe infarct.    < end of copied text >    < from: CT Head No Cont (10.03.23 @ 20:46) >    IMPRESSION:    No acute intracranial hemorrhage or mass effect. Evaluation of the   posterior fossa degraded by streak artifact from dental amalgam.   Lucencies in the bilateral cerebellum may be artifactual.    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    Bilateral middle ear and mastoid effusions which are also seen on prior   exam.    EEG Classification / Summary:  Abnormal EEG in the awake, drowsy states.   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   -No seizures

## 2023-11-03 NOTE — PROGRESS NOTE ADULT - ASSESSMENT
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       NEUROLOGY  #AMS  Multiple etiologies including active infection vs CVA.   MR head performed w/ new infarct and old infarcts, EEG without seizure events but with high foci potential   - f/u spot EEG given facial twitching: negative for epileptiform activity (10/31)  - f/u repeat MRI --> per neuro, can pursue MRI after EEG to aid in neuroprognostication as patient is not waking up  - continue lipitor  - continue keppra given above  - aspirin held 10/11, likely in s/o GIB, Hgb stable not requiring pRBC since 10/14      CARDIOVASCULAR  # Septic vs vasoplegic shock   Etiology likely septic iso active infection. Possible component of vasoplegic, however no longer with active infection or on sedation. Off IV vasopressors.   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   - resolved    RESPIRATORY  # AHRF second to volume overload   - CKD vs HFpEF w/ hypercarbia 2/2 volume overload requiring intubation, trach, and HD initiation  - Continue on albuterol and chest PT  - Continue volume removal with HD      #tracheomalacia   - CT CHEST (9/8) with tracheomalacia, BL pleural effusions and continued consolidations     GI  # UGIB: last pRBC transfused 10/14  - Continue on PPI BID    # Dysphagia   - NGT-TF     RENAL  # ESRD  - HD MWF TIW with midodrine and droxidopa prior  - ICU for vasopressor assisted volume removal, cap to 1 pressor and not offering CRRT due to comorbidities and prognosis   - f/u w/ nephrology: will continue to offer patient 1-pressor assisted HD as agreed prior, as long as she can tolerate HD maxed on 1 pressor, she will be considered iHD candidate  - UF -2.5L attempted 10/30, reached 1.7L limited by hypotension and tachycardia to 130s, able to remove 2L 11/1    INFECTIOUS DISEASE  #Sepsis: temp 101.2 on 11/2; sources include skin (poorly demarcated area of induration/erythema, enlarging on RLQ), pulm (copious sputum)  - Bcx, sputum cx sent  - ID re-consulted  - Abx:      --> Vanc (11/2 - ), level pre-HD     --> Aztreo 2g     # 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 Coli sputum Cx, BAL w/ MSSA  - treated with abx   - eosinophilia workup     # Proteus and  PSA VAP/ Trachitis   - Overall difficulty with ABX tailoring given allergic rxns and resistant organisms  - dc aztreonam    # MAC   - outpatient treatment    HEME  # Anemia second to GIB vs renal disease   - received transfusions during stay  - management as per renal, PPI    VASCULAR   # LLE POP DVT   - on Eliquis    # HD access  - TRISHA TAYLOR (9/27 - 10/21)  - TRISHA taylor replaced for HD     ONC   # Hx of Breast CA   - Patient dx in 2018 and s/p BL mastectomy (radical on right) and chemo and RT.     ENDOCRINE  # IDDM2   - Continued on NPH with ISS, however noted with hypoglycemic episodes and NPH dc'ed.    - Finger sticks trending up off NPH.   - Continue on NPH 6U with moderate ISS.   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       NEUROLOGY  #AMS  Multiple etiologies including active infection vs CVA.   MR head performed w/ new infarct and old infarcts, EEG without seizure events but with high foci potential   - f/u spot EEG given facial twitching: negative for epileptiform activity (10/31)  - f/u repeat MRI --> per neuro, can pursue MRI after EEG to aid in neuroprognostication as patient is not waking up  - continue lipitor  - continue keppra given above  - aspirin held 10/11, likely in s/o GIB, Hgb stable not requiring pRBC since 10/14      CARDIOVASCULAR  # Septic vs vasoplegic shock   Etiology likely septic iso active infection. Possible component of vasoplegic, however no longer with active infection or on sedation. Off IV vasopressors.   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   - resolved    RESPIRATORY  # AHRF second to volume overload   - CKD vs HFpEF w/ hypercarbia 2/2 volume overload requiring intubation, trach, and HD initiation  - Continue on albuterol and chest PT  - Continue volume removal with HD      #tracheomalacia   - CT CHEST (9/8) with tracheomalacia, BL pleural effusions and continued consolidations     GI  # UGIB: last pRBC transfused 10/14  - Continue on PPI BID    # Dysphagia   - NGT-TF     RENAL  # ESRD  - HD MWF TIW with midodrine and droxidopa prior  - ICU for vasopressor assisted volume removal, cap to 1 pressor and not offering CRRT due to comorbidities and prognosis   - f/u w/ nephrology: will continue to offer patient 1-pressor assisted HD as agreed prior, as long as she can tolerate HD maxed on 1 pressor, she will be considered iHD candidate  - UF -2.5L attempted 10/30, reached 1.7L limited by hypotension and tachycardia to 130s, able to remove 2L 11/1    INFECTIOUS DISEASE  #Sepsis: temp 101.2 on 11/2; sources include skin (poorly demarcated area of induration/erythema, enlarging on RLQ), pulm (copious sputum)  - Bcx, sputum cx sent  - repeat MRSA PCR neg  - ID re-consulted: 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 Coli sputum Cx, BAL w/ MSSA  - treated with abx   - eosinophilia workup     # Proteus and  PSA VAP/ Trachitis   - Overall difficulty with ABX tailoring given allergic rxns and resistant organisms  - dc aztreonam    # MAC   - outpatient treatment    HEME  # Anemia second to GIB vs renal disease   - received transfusions during stay  - management as per renal, PPI    VASCULAR   # LLE POP DVT   - on Eliquis    # HD access  - TRISHA TAYLOR (9/27 - 10/21)  - TRIHSA taylor replaced for HD     ONC   # Hx of Breast CA   - Patient dx in 2018 and s/p BL mastectomy (radical on right) and chemo and RT.     ENDOCRINE  # IDDM2   - Continued on NPH with ISS, however noted with hypoglycemic episodes and NPH dc'ed.    - Finger sticks trending up off NPH.   - Continue on NPH 6U with moderate ISS.   - Adjust as need     SKIN  # Drug Eruption   - treated    ETHICS/ GOC    - Attempted palliative discussion however family not interested and wishes for FULL CODE  - Family continues to want everything done despite inability to tolerate HD on multiple oral pressor

## 2023-11-03 NOTE — PROGRESS NOTE ADULT - PROBLEM SELECTOR PLAN 1
- continue with ciprodex and baci to pinna  - ENT to remove bridger wick - external edema improved - able to visualize the TM   -  ENT continue to follow

## 2023-11-03 NOTE — PROGRESS NOTE ADULT - ASSESSMENT
72 y/o F well known to me from my [facility] outpt practice. she was admitted at [facility] 7/12-7/22 w aspiration PNA, was treated w CEFEPIME, developed an allergic rash,  dCHF, + MAC on AFB culture, had been progressively getting more and more lethargic and dyspneic at home since DC.   In  am of 8/11/23  ptn presented with respiratory distress w hypoxia and hypercarbia requiring intubation 2/2 volume overload +/- Asp PNA      Neuro   not responsive  Baseline MS AOx3, aphasic   - h/o CVA , on aspirin and statin . resumed w feeding tube, ASA resumed 8/26  - eeg  2/2 tremors, no sz focus, right facial twitching, on KEPPRA  - less responsive in the past few days  - MRI 8/17:  new R cerebellar infarct, old left PCA/Occipital infarct. probably embolic in nature, did not tolerate full AC in the past, STEPHANIE is neg , no shunts observed  - ptn is poorly reactive, rpt scan done, no further CVA seen.     Cardiac   cardiology following  CHFpEF   TTE 7/2023 with EF 59%, with severe LVH and diastolic dysfunction   on Levo drip 2/2 hypotension   on midodrine 40 mg qid, droxidopa 600 tid, additional 200 mg prior to HD,     Pulmonary   Acute hypercapnic and hypoxemic respiratory failure   prolonged intubation, now  trache to  vent, has copious secretions      Renal   on HD via L IJ Shiley, tunneled catheter when clinically stable  HD MWF, midodrine assisted, PUF in between HD days, unable to remove proper volume 2/2 hypotension.   permacath when clinically stable      GI  NPO  on tube feeds  coffee ground emesis x 1 8/24, melena overnight 8/31, s/p 1UPRBC, EGD/Colonoscopy: erosive gastropathy, esophagisits, on colonoscopy old blood seen no active bleeding, poor prep  family to decide re PEG placement    Endocrine  on Insulin: NPH, Admelog    ID   complicated infectious hospital course  treated for MSSA bacteremia, aspiration PNA.  sputum + for pseudomonas, ptn was initially on zosyn but was allergic, then was switched to aztreonam   Aztreonam was switched to polymyxin for a possible allergy but ptn didnt have a reaction to aztreonam, she had a reaction to Zosyn.   spike temps again while off Abx, Aztreonam resumed 10/17, DCed 10/29  tmax 104.6 on 11/2: pancultured, aztreonam and Vancomycin resumed. afebrile since. ID recs: DC vanco, cont Aztreonam. 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 and she remains full code at present

## 2023-11-04 NOTE — PROGRESS NOTE ADULT - ASSESSMENT
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       NEUROLOGY  #AMS  Multiple etiologies including active infection vs CVA.   MR head performed w/ new infarct and old infarcts, EEG without seizure events but with high foci potential   - f/u spot EEG given facial twitching: negative for epileptiform activity (10/31)  - f/u repeat MRI --> per neuro, can pursue MRI after EEG to aid in neuroprognostication as patient is not waking up  - continue lipitor  - continue keppra given above  - aspirin held 10/11, likely in s/o GIB, Hgb stable not requiring pRBC since 10/14      CARDIOVASCULAR  # Septic vs vasoplegic shock   Etiology likely septic iso active infection. Possible component of vasoplegic, however no longer with active infection or on sedation. Off IV vasopressors.   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   - resolved    RESPIRATORY  # AHRF second to volume overload   - CKD vs HFpEF w/ hypercarbia 2/2 volume overload requiring intubation, trach, and HD initiation  - Continue on albuterol and chest PT  - Continue volume removal with HD      #tracheomalacia   - CT CHEST (9/8) with tracheomalacia, BL pleural effusions and continued consolidations     GI  # UGIB: last pRBC transfused 10/14  - Continue on PPI BID    # Dysphagia   - NGT-TF     RENAL  # ESRD  - HD MWF TIW with midodrine and droxidopa prior  - ICU for vasopressor assisted volume removal, cap to 1 pressor and not offering CRRT due to comorbidities and prognosis   - f/u w/ nephrology: will continue to offer patient 1-pressor assisted HD as agreed prior, as long as she can tolerate HD maxed on 1 pressor, she will be considered iHD candidate  - UF -2.5L attempted 10/30, reached 1.7L limited by hypotension and tachycardia to 130s, able to remove 2L 11/1    INFECTIOUS DISEASE  #Sepsis: temp 101.2 on 11/2; sources include skin (poorly demarcated area of induration/erythema, enlarging on RLQ), pulm (copious sputum)  - Bcx, sputum cx sent  - repeat MRSA PCR neg  - ID re-consulted: 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 Coli sputum Cx, BAL w/ MSSA  - treated with abx   - eosinophilia workup     # Proteus and  PSA VAP/ Trachitis   - Overall difficulty with ABX tailoring given allergic rxns and resistant organisms  - dc aztreonam    # MAC   - outpatient treatment    HEME  # Anemia second to GIB vs renal disease   - received transfusions during stay  - management as per renal, PPI    VASCULAR   # LLE POP DVT   - on Eliquis    # HD access  - TRISHA TAYLOR (9/27 - 10/21)  - TRISHA taylor replaced for HD     ONC   # Hx of Breast CA   - Patient dx in 2018 and s/p BL mastectomy (radical on right) and chemo and RT.     ENDOCRINE  # IDDM2   - Continued on NPH with ISS, however noted with hypoglycemic episodes and NPH dc'ed.    - Finger sticks trending up off NPH.   - Continue on NPH 6U with moderate ISS.   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       NEUROLOGY  #AMS  Multiple etiologies including active infection vs CVA.   MR head performed w/ new infarct and old infarcts, EEG without seizure events but with high foci potential   - f/u spot EEG given facial twitching: negative for epileptiform activity (10/31)  - deferring repeat MRI as unlikely to   - continue lipitor  - continue keppra given above  - aspirin held 10/11, likely in s/o GIB, Hgb stable not requiring pRBC since 10/14      CARDIOVASCULAR  # Septic vs vasoplegic shock   Etiology likely septic iso active infection. Possible component of vasoplegic, however no longer with active infection or on sedation. Off IV vasopressors.   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   - resolved    RESPIRATORY  # AHRF second to volume overload   - CKD vs HFpEF w/ hypercarbia 2/2 volume overload requiring intubation, trach, and HD initiation  - Continue on albuterol and chest PT  - Continue volume removal with HD      #tracheomalacia   - CT CHEST (9/8) with tracheomalacia, BL pleural effusions and continued consolidations     GI  # UGIB: last pRBC transfused 11/4 (10/14 before that)   - Continue on PPI BID    # Dysphagia   - NGT-TF     RENAL  # ESRD  - HD MWF TIW with midodrine and droxidopa prior  - ICU for vasopressor assisted volume removal, cap to 1 pressor and not offering CRRT due to comorbidities and prognosis   - f/u w/ nephrology: will continue to offer patient 1-pressor assisted HD as agreed prior, as long as she can tolerate HD maxed on 1 pressor, she will be considered iHD candidate  - UF -2.5L attempted 10/30, reached 1.7L limited by hypotension and tachycardia to 130s, able to remove 2L 11/1, 2L 11/3    INFECTIOUS DISEASE  #Sepsis: temp 101.2 on 11/2; sources include skin (poorly demarcated area of induration/erythema, enlarging on RLQ), pulm (copious sputum)  - Bcx, sputum cx sent  - repeat MRSA PCR neg  - ID re-consulted: abd edema likely 2/2 chronic panniculitis  - Abx:      --> Vanc (11/2) - MRSA neg, dc'd     --> Aztreo 2g     # 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 Coli sputum Cx, BAL w/ MSSA  - treated with abx   - eosinophilia workup: JORGE, ANCAs negative    # MAC   - outpatient treatment    HEME  # Anemia second to GIB vs renal disease   - multiple transfusions, last done 11/4  - management as per renal, PPI    #Allergic transfusion reaction: per [facility] documentation, developed erythematous rash across chest shortly after starting transfusion, blood bank consulted and diagnosed mild allergic rxn  - premedicate w/ benadryl and famotidine prior to future transfusions    VASCULAR   # LLE POP DVT   - on Eliquis    # HD access  - TRISHA TAYLOR (9/27 - 10/21)  - TRISHA taylor replaced for HD     ONC   # Hx of Breast CA   - Patient dx in 2018 and s/p BL mastectomy (radical on right) and chemo and RT.     ENDOCRINE  # IDDM2   - Continued on NPH with ISS, however noted with hypoglycemic episodes and NPH dc'ed.    - Finger sticks trending up off NPH.   - Continue on NPH 6U with moderate ISS.   - Adjust as need     SKIN  # Drug Eruption   - treated    ETHICS/ GOC    - Attempted palliative discussion however family not interested and wishes for FULL CODE  - Family continues to want everything done despite inability to tolerate HD on multiple oral pressor

## 2023-11-04 NOTE — PROGRESS NOTE ADULT - SUBJECTIVE AND OBJECTIVE BOX
Patient is a 73y old  Female who presents with a chief complaint of Respiratory distress (04 Nov 2023 20:31)      SUBJECTIVE / OVERNIGHT EVENTS: cx NTD, off aztreonam    MEDICATIONS  (STANDING):  albuterol    0.083% 2.5 milliGRAM(s) Nebulizer every 6 hours  apixaban 5 milliGRAM(s) Oral every 12 hours  artificial tears (preservative free) Ophthalmic Solution 1 Drop(s) Both EYES every 4 hours  atorvastatin 10 milliGRAM(s) Oral at bedtime  chlorhexidine 0.12% Liquid 15 milliLiter(s) Oral Mucosa every 12 hours  chlorhexidine 2% Cloths 1 Application(s) Topical daily  ciprofloxacin/hydrocortisone Suspension Otic 4 Drop(s) Left Ear two times a day  dextrose 5%. 1000 milliLiter(s) (100 mL/Hr) IV Continuous <Continuous>  dextrose 5%. 1000 milliLiter(s) (50 mL/Hr) IV Continuous <Continuous>  dextrose 50% Injectable 25 Gram(s) IV Push once  dextrose 50% Injectable 12.5 Gram(s) IV Push once  dextrose 50% Injectable 25 Gram(s) IV Push once  dextrose 50% Injectable 25 Gram(s) IV Push once  droxidopa 600 milliGRAM(s) Oral every 8 hours  epoetin odalis (EPOGEN) Injectable 17409 Unit(s) IV Push <User Schedule>  ferrous    sulfate Liquid 300 milliGRAM(s) Oral daily  glucagon  Injectable 1 milliGRAM(s) IntraMuscular once  insulin lispro (ADMELOG) corrective regimen sliding scale   SubCutaneous every 6 hours  insulin NPH human recombinant 6 Unit(s) SubCutaneous every 6 hours  levETIRAcetam  Solution 1000 milliGRAM(s) Oral daily  levETIRAcetam  Solution 250 milliGRAM(s) Oral <User Schedule>  midodrine. 40 milliGRAM(s) Oral <User Schedule>  norepinephrine Infusion 0.05 MICROgram(s)/kG/Min (6.23 mL/Hr) IV Continuous <Continuous>  pantoprazole  Injectable 40 milliGRAM(s) IV Push two times a day  petrolatum Ophthalmic Ointment 1 Application(s) Both EYES four times a day  sodium chloride 1 Gram(s) Oral two times a day    MEDICATIONS  (PRN):  acetaminophen   Oral Liquid .. 650 milliGRAM(s) Oral every 6 hours PRN Temp greater or equal to 38C (100.4F), Mild Pain (1 - 3)  dextrose Oral Gel 15 Gram(s) Oral once PRN Blood Glucose LESS THAN 70 milliGRAM(s)/deciliter  diphenhydrAMINE Elixir 50 milliGRAM(s) Oral once PRN Rash and/or Itching  droxidopa 200 milliGRAM(s) Oral <User Schedule> PRN Prior to hemodialysis  midodrine. 20 milliGRAM(s) Oral <User Schedule> PRN 1 Hour Pre HD  sodium chloride 0.9% Bolus. 250 milliLiter(s) IV Bolus every 5 minutes PRN SBP LESS THAN or EQUAL to 90 mmHg  sodium chloride 0.9% lock flush 10 milliLiter(s) IV Push every 1 hour PRN Pre/post blood products, medications, blood draw, and to maintain line patency      Vital Signs Last 24 Hrs  T(F): 98.3 (11-04-23 @ 20:00), Max: 99.6 (11-04-23 @ 00:00)  HR: 119 (11-04-23 @ 22:00) (110 - 123)  BP: --  RR: 24 (11-04-23 @ 22:00) (16 - 32)  SpO2: 98% (11-04-23 @ 22:00) (94% - 100%)  Telemetry:   CAPILLARY BLOOD GLUCOSE      POCT Blood Glucose.: 174 mg/dL (04 Nov 2023 23:03)  POCT Blood Glucose.: 174 mg/dL (04 Nov 2023 18:01)  POCT Blood Glucose.: 167 mg/dL (04 Nov 2023 11:58)  POCT Blood Glucose.: 139 mg/dL (04 Nov 2023 05:00)  POCT Blood Glucose.: 135 mg/dL (03 Nov 2023 23:27)    I&O's Summary    03 Nov 2023 07:01  -  04 Nov 2023 07:00  --------------------------------------------------------  IN: 1511.2 mL / OUT: 2500 mL / NET: -988.8 mL    04 Nov 2023 08:01  -  04 Nov 2023 23:24  --------------------------------------------------------  IN: 1160 mL / OUT: 0 mL / NET: 1160 mL        PHYSICAL EXAM:  GENERAL: NAD, well-developed  HEAD:  Atraumatic, Normocephalic  EYES: EOMI, PERRLA, conjunctiva and sclera clear  NECK: Supple, No JVD  CHEST/LUNG: Clear to auscultation bilaterally; No wheeze  HEART: Regular rate and rhythm; No murmurs, rubs, or gallops  ABDOMEN: Soft, Nontender, Nondistended; Bowel sounds present  EXTREMITIES:  2+ Peripheral Pulses, No clubbing, cyanosis, or edema  PSYCH: AAOx3  NEUROLOGY: non-focal  SKIN: No rashes or lesions    LABS:                        7.8    12.02 )-----------( 382      ( 04 Nov 2023 18:30 )             26.4     11-04    137  |  103  |  21  ----------------------------<  140<H>  3.9   |  26  |  0.99    Ca    8.4      04 Nov 2023 00:40  Phos  3.9     11-04  Mg     2.20     11-04    TPro  6.2  /  Alb  1.9<L>  /  TBili  <0.2  /  DBili  x   /  AST  16  /  ALT  12  /  AlkPhos  230<H>  11-04    PTT - ( 03 Nov 2023 00:10 )  PTT:32.9 sec      Urinalysis Basic - ( 04 Nov 2023 00:40 )    Color: x / Appearance: x / SG: x / pH: x  Gluc: 140 mg/dL / Ketone: x  / Bili: x / Urobili: x   Blood: x / Protein: x / Nitrite: x   Leuk Esterase: x / RBC: x / WBC x   Sq Epi: x / Non Sq Epi: x / Bacteria: x        RADIOLOGY & ADDITIONAL TESTS:    Imaging Personally Reviewed:    Consultant(s) Notes Reviewed:      Care Discussed with Consultants/Other Providers:

## 2023-11-04 NOTE — PROGRESS NOTE ADULT - SUBJECTIVE AND OBJECTIVE BOX
MICU Progress Note    INTERVAL HPI/OVERNIGHT EVENTS:    SUBJECTIVE: Patient seen and examined at bedside.     ROS: unable to assess    OBJECTIVE:    VITAL SIGNS:  ICU Vital Signs Last 24 Hrs  T(C): 36.9 (04 Nov 2023 04:00), Max: 37.8 (03 Nov 2023 20:00)  T(F): 98.5 (04 Nov 2023 04:00), Max: 100 (03 Nov 2023 20:00)  HR: 115 (04 Nov 2023 06:00) (78 - 134)  BP: --  BP(mean): --  ABP: 142/57 (04 Nov 2023 06:00) (85/34 - 160/56)  ABP(mean): 94 (04 Nov 2023 06:00) (53 - 99)  RR: 24 (04 Nov 2023 06:00) (16 - 32)  SpO2: 100% (04 Nov 2023 06:00) (95% - 100%)    O2 Parameters below as of 04 Nov 2023 04:00  Patient On (Oxygen Delivery Method): ventilator    O2 Concentration (%): 40      Mode: AC/ CMV (Assist Control/ Continuous Mandatory Ventilation), RR (machine): 24, FiO2: 40, PEEP: 8, ITime: 0.8, MAP: 20, PC: 35, PIP: 44    11-03 @ 07:01  -  11-04 @ 07:00  --------------------------------------------------------  IN: 1511.2 mL / OUT: 2500 mL / NET: -988.8 mL      CAPILLARY BLOOD GLUCOSE      POCT Blood Glucose.: 139 mg/dL (04 Nov 2023 05:00)      PHYSICAL EXAM:  General: NAD  HEENT: NC/AT; PERRL, clear conjunctiva; L ear w/ grainy white residue, no bleeding appreciated  Neck: supple  Respiratory: CTA b/l  Cardiovascular: +S1/S2; RRR  Abdomen: soft, distended, warm to touch w/ area of erythema and induration RLQ  Extremities: WWP, 2+ peripheral pulses b/l; diffuse anasarca  Skin: normal color and turgor; no rash  Neurological: AO*0; rhythmic R facial and arm twitching      MEDICATIONS:  MEDICATIONS  (STANDING):  albuterol    0.083% 2.5 milliGRAM(s) Nebulizer every 6 hours  apixaban 5 milliGRAM(s) Oral every 12 hours  artificial tears (preservative free) Ophthalmic Solution 1 Drop(s) Both EYES every 4 hours  atorvastatin 10 milliGRAM(s) Oral at bedtime  aztreonam  IVPB 2000 milliGRAM(s) IV Intermittent <User Schedule>  chlorhexidine 0.12% Liquid 15 milliLiter(s) Oral Mucosa every 12 hours  chlorhexidine 2% Cloths 1 Application(s) Topical daily  ciprofloxacin/hydrocortisone Suspension Otic 4 Drop(s) Left Ear two times a day  dextrose 5%. 1000 milliLiter(s) (50 mL/Hr) IV Continuous <Continuous>  dextrose 5%. 1000 milliLiter(s) (100 mL/Hr) IV Continuous <Continuous>  dextrose 50% Injectable 25 Gram(s) IV Push once  dextrose 50% Injectable 12.5 Gram(s) IV Push once  dextrose 50% Injectable 25 Gram(s) IV Push once  dextrose 50% Injectable 25 Gram(s) IV Push once  droxidopa 600 milliGRAM(s) Oral every 8 hours  epoetin odalis (EPOGEN) Injectable 87478 Unit(s) IV Push <User Schedule>  ferrous    sulfate Liquid 300 milliGRAM(s) Oral daily  glucagon  Injectable 1 milliGRAM(s) IntraMuscular once  insulin lispro (ADMELOG) corrective regimen sliding scale   SubCutaneous every 6 hours  insulin NPH human recombinant 6 Unit(s) SubCutaneous every 6 hours  levETIRAcetam  Solution 1000 milliGRAM(s) Oral daily  levETIRAcetam  Solution 250 milliGRAM(s) Oral <User Schedule>  midodrine. 40 milliGRAM(s) Oral <User Schedule>  norepinephrine Infusion 0.05 MICROgram(s)/kG/Min (6.23 mL/Hr) IV Continuous <Continuous>  pantoprazole  Injectable 40 milliGRAM(s) IV Push two times a day  petrolatum Ophthalmic Ointment 1 Application(s) Both EYES four times a day  sodium chloride 1 Gram(s) Oral two times a day    MEDICATIONS  (PRN):  acetaminophen   Oral Liquid .. 650 milliGRAM(s) Oral every 6 hours PRN Temp greater or equal to 38C (100.4F), Mild Pain (1 - 3)  dextrose Oral Gel 15 Gram(s) Oral once PRN Blood Glucose LESS THAN 70 milliGRAM(s)/deciliter  diphenhydrAMINE Elixir 50 milliGRAM(s) Oral once PRN Rash and/or Itching  droxidopa 200 milliGRAM(s) Oral <User Schedule> PRN Prior to hemodialysis  midodrine. 20 milliGRAM(s) Oral <User Schedule> PRN 1 Hour Pre HD  sodium chloride 0.9% Bolus. 250 milliLiter(s) IV Bolus every 5 minutes PRN SBP LESS THAN or EQUAL to 90 mmHg  sodium chloride 0.9% lock flush 10 milliLiter(s) IV Push every 1 hour PRN Pre/post blood products, medications, blood draw, and to maintain line patency      ALLERGIES:  Allergies    isoniazid (Rash)  nafcillin (Unknown)  hydrALAZINE (Rash)  vitamin E (Short breath; Urticaria; Hives)  doxycycline (Rash)  cefepime (Rash)  NIFEdipine (Urticaria; Hives)  Zosyn (Rash)    Intolerances        LABS:                        6.4    13.56 )-----------( 383      ( 04 Nov 2023 01:30 )             22.4     11-04    137  |  103  |  21  ----------------------------<  140<H>  3.9   |  26  |  0.99    Ca    8.4      04 Nov 2023 00:40  Phos  3.9     11-04  Mg     2.20     11-04    TPro  6.2  /  Alb  1.9<L>  /  TBili  <0.2  /  DBili  x   /  AST  16  /  ALT  12  /  AlkPhos  230<H>  11-04    PTT - ( 03 Nov 2023 00:10 )  PTT:32.9 sec  Urinalysis Basic - ( 04 Nov 2023 00:40 )    Color: x / Appearance: x / SG: x / pH: x  Gluc: 140 mg/dL / Ketone: x  / Bili: x / Urobili: x   Blood: x / Protein: x / Nitrite: x   Leuk Esterase: x / RBC: x / WBC x   Sq Epi: x / Non Sq Epi: x / Bacteria: x        RADIOLOGY & ADDITIONAL TESTS: Reviewed. MICU Progress Note    INTERVAL HPI/OVERNIGHT EVENTS: Hgb 6.4; deferred transfusion at that time due to h/o ?transfusion reaction.     SUBJECTIVE: Patient seen and examined at bedside.     ROS: unable to assess    OBJECTIVE:    VITAL SIGNS:  ICU Vital Signs Last 24 Hrs  T(C): 36.9 (04 Nov 2023 04:00), Max: 37.8 (03 Nov 2023 20:00)  T(F): 98.5 (04 Nov 2023 04:00), Max: 100 (03 Nov 2023 20:00)  HR: 115 (04 Nov 2023 06:00) (78 - 134)  BP: --  BP(mean): --  ABP: 142/57 (04 Nov 2023 06:00) (85/34 - 160/56)  ABP(mean): 94 (04 Nov 2023 06:00) (53 - 99)  RR: 24 (04 Nov 2023 06:00) (16 - 32)  SpO2: 100% (04 Nov 2023 06:00) (95% - 100%)    O2 Parameters below as of 04 Nov 2023 04:00  Patient On (Oxygen Delivery Method): ventilator    O2 Concentration (%): 40      Mode: AC/ CMV (Assist Control/ Continuous Mandatory Ventilation), RR (machine): 24, FiO2: 40, PEEP: 8, ITime: 0.8, MAP: 20, PC: 35, PIP: 44    11-03 @ 07:01  -  11-04 @ 07:00  --------------------------------------------------------  IN: 1511.2 mL / OUT: 2500 mL / NET: -988.8 mL      CAPILLARY BLOOD GLUCOSE      POCT Blood Glucose.: 139 mg/dL (04 Nov 2023 05:00)      PHYSICAL EXAM:  General: NAD  HEENT: NC/AT; PERRL, clear conjunctiva; L ear w/ grainy white residue, no bleeding appreciated  Neck: supple  Respiratory: CTA b/l  Cardiovascular: +S1/S2; RRR  Abdomen: soft, distended, warm to touch w/ area of erythema and induration RLQ  Extremities: WWP, 2+ peripheral pulses b/l; diffuse anasarca  Skin: normal color and turgor; no rash  Neurological: AO*0; rhythmic R facial and arm twitching      MEDICATIONS:  MEDICATIONS  (STANDING):  albuterol    0.083% 2.5 milliGRAM(s) Nebulizer every 6 hours  apixaban 5 milliGRAM(s) Oral every 12 hours  artificial tears (preservative free) Ophthalmic Solution 1 Drop(s) Both EYES every 4 hours  atorvastatin 10 milliGRAM(s) Oral at bedtime  aztreonam  IVPB 2000 milliGRAM(s) IV Intermittent <User Schedule>  chlorhexidine 0.12% Liquid 15 milliLiter(s) Oral Mucosa every 12 hours  chlorhexidine 2% Cloths 1 Application(s) Topical daily  ciprofloxacin/hydrocortisone Suspension Otic 4 Drop(s) Left Ear two times a day  dextrose 5%. 1000 milliLiter(s) (50 mL/Hr) IV Continuous <Continuous>  dextrose 5%. 1000 milliLiter(s) (100 mL/Hr) IV Continuous <Continuous>  dextrose 50% Injectable 25 Gram(s) IV Push once  dextrose 50% Injectable 12.5 Gram(s) IV Push once  dextrose 50% Injectable 25 Gram(s) IV Push once  dextrose 50% Injectable 25 Gram(s) IV Push once  droxidopa 600 milliGRAM(s) Oral every 8 hours  epoetin odalis (EPOGEN) Injectable 15159 Unit(s) IV Push <User Schedule>  ferrous    sulfate Liquid 300 milliGRAM(s) Oral daily  glucagon  Injectable 1 milliGRAM(s) IntraMuscular once  insulin lispro (ADMELOG) corrective regimen sliding scale   SubCutaneous every 6 hours  insulin NPH human recombinant 6 Unit(s) SubCutaneous every 6 hours  levETIRAcetam  Solution 1000 milliGRAM(s) Oral daily  levETIRAcetam  Solution 250 milliGRAM(s) Oral <User Schedule>  midodrine. 40 milliGRAM(s) Oral <User Schedule>  norepinephrine Infusion 0.05 MICROgram(s)/kG/Min (6.23 mL/Hr) IV Continuous <Continuous>  pantoprazole  Injectable 40 milliGRAM(s) IV Push two times a day  petrolatum Ophthalmic Ointment 1 Application(s) Both EYES four times a day  sodium chloride 1 Gram(s) Oral two times a day    MEDICATIONS  (PRN):  acetaminophen   Oral Liquid .. 650 milliGRAM(s) Oral every 6 hours PRN Temp greater or equal to 38C (100.4F), Mild Pain (1 - 3)  dextrose Oral Gel 15 Gram(s) Oral once PRN Blood Glucose LESS THAN 70 milliGRAM(s)/deciliter  diphenhydrAMINE Elixir 50 milliGRAM(s) Oral once PRN Rash and/or Itching  droxidopa 200 milliGRAM(s) Oral <User Schedule> PRN Prior to hemodialysis  midodrine. 20 milliGRAM(s) Oral <User Schedule> PRN 1 Hour Pre HD  sodium chloride 0.9% Bolus. 250 milliLiter(s) IV Bolus every 5 minutes PRN SBP LESS THAN or EQUAL to 90 mmHg  sodium chloride 0.9% lock flush 10 milliLiter(s) IV Push every 1 hour PRN Pre/post blood products, medications, blood draw, and to maintain line patency      ALLERGIES:  Allergies    isoniazid (Rash)  nafcillin (Unknown)  hydrALAZINE (Rash)  vitamin E (Short breath; Urticaria; Hives)  doxycycline (Rash)  cefepime (Rash)  NIFEdipine (Urticaria; Hives)  Zosyn (Rash)    Intolerances        LABS:                        6.4    13.56 )-----------( 383      ( 04 Nov 2023 01:30 )             22.4     11-04    137  |  103  |  21  ----------------------------<  140<H>  3.9   |  26  |  0.99    Ca    8.4      04 Nov 2023 00:40  Phos  3.9     11-04  Mg     2.20     11-04    TPro  6.2  /  Alb  1.9<L>  /  TBili  <0.2  /  DBili  x   /  AST  16  /  ALT  12  /  AlkPhos  230<H>  11-04    PTT - ( 03 Nov 2023 00:10 )  PTT:32.9 sec  Urinalysis Basic - ( 04 Nov 2023 00:40 )    Color: x / Appearance: x / SG: x / pH: x  Gluc: 140 mg/dL / Ketone: x  / Bili: x / Urobili: x   Blood: x / Protein: x / Nitrite: x   Leuk Esterase: x / RBC: x / WBC x   Sq Epi: x / Non Sq Epi: x / Bacteria: x        RADIOLOGY & ADDITIONAL TESTS: Reviewed.

## 2023-11-04 NOTE — PROGRESS NOTE ADULT - SUBJECTIVE AND OBJECTIVE BOX
Subjective: Patient seen and examined. No new events except as noted.   remains in ICU   Hgb 6.4, not transfused due to history of transfusion reaction     REVIEW OF SYSTEMS:  Unable to obtain       MEDICATIONS:  MEDICATIONS  (STANDING):  albuterol    0.083% 2.5 milliGRAM(s) Nebulizer every 6 hours  apixaban 5 milliGRAM(s) Oral every 12 hours  artificial tears (preservative free) Ophthalmic Solution 1 Drop(s) Both EYES every 4 hours  atorvastatin 10 milliGRAM(s) Oral at bedtime  chlorhexidine 0.12% Liquid 15 milliLiter(s) Oral Mucosa every 12 hours  chlorhexidine 2% Cloths 1 Application(s) Topical daily  ciprofloxacin/hydrocortisone Suspension Otic 4 Drop(s) Left Ear two times a day  dextrose 5%. 1000 milliLiter(s) (50 mL/Hr) IV Continuous <Continuous>  dextrose 5%. 1000 milliLiter(s) (100 mL/Hr) IV Continuous <Continuous>  dextrose 50% Injectable 25 Gram(s) IV Push once  dextrose 50% Injectable 12.5 Gram(s) IV Push once  dextrose 50% Injectable 25 Gram(s) IV Push once  dextrose 50% Injectable 25 Gram(s) IV Push once  droxidopa 600 milliGRAM(s) Oral every 8 hours  epoetin odalis (EPOGEN) Injectable 21223 Unit(s) IV Push <User Schedule>  ferrous    sulfate Liquid 300 milliGRAM(s) Oral daily  glucagon  Injectable 1 milliGRAM(s) IntraMuscular once  insulin lispro (ADMELOG) corrective regimen sliding scale   SubCutaneous every 6 hours  insulin NPH human recombinant 6 Unit(s) SubCutaneous every 6 hours  levETIRAcetam  Solution 1000 milliGRAM(s) Oral daily  levETIRAcetam  Solution 250 milliGRAM(s) Oral <User Schedule>  midodrine. 40 milliGRAM(s) Oral <User Schedule>  norepinephrine Infusion 0.05 MICROgram(s)/kG/Min (6.23 mL/Hr) IV Continuous <Continuous>  pantoprazole  Injectable 40 milliGRAM(s) IV Push two times a day  petrolatum Ophthalmic Ointment 1 Application(s) Both EYES four times a day  sodium chloride 1 Gram(s) Oral two times a day      PHYSICAL EXAM:  T(C): 36.8 (11-04-23 @ 20:00), Max: 37.6 (11-04-23 @ 00:00)  HR: 118 (11-04-23 @ 20:00) (110 - 123)  BP: --  RR: 21 (11-04-23 @ 20:00) (16 - 32)  SpO2: 97% (11-04-23 @ 20:00) (94% - 100%)  Wt(kg): --  I&O's Summary    03 Nov 2023 07:01  -  04 Nov 2023 07:00  --------------------------------------------------------  IN: 1511.2 mL / OUT: 2500 mL / NET: -988.8 mL    04 Nov 2023 08:01  -  04 Nov 2023 20:31  --------------------------------------------------------  IN: 1040 mL / OUT: 0 mL / NET: 1040 mL            Appearance: NAD, +trach  HEENT: dry oral mucosa  Lymphatic: No lymphadenopathy  Cardiovascular: Normal S1 S2, No JVD, No murmurs, No edema  Respiratory: Decreased BS, + trach to vent	  Neuro: opens eyes  Gastrointestinal: Soft, Non-tender, + BS, + NGT  Skin: No rashes, No ecchymoses, No cyanosis	  Extremities: No strength/ROM 2/2 sedation, + BL LE edema  Vascular: Peripheral pulses palpable 2+ bilaterally  Anasarca   Mode: AC/ CMV (Assist Control/ Continuous Mandatory Ventilation), RR (machine): 24, FiO2: 50, PEEP: 8, ITime: 0.8, MAP: 20, PC: 35, PIP: 43      LABS:    CARDIAC MARKERS:    Blood Gas Profile - Arterial (11.04.23 @ 00:40)   pH, Arterial: 7.30  pCO2, Arterial: 58 mmHg  pO2, Arterial: 67 mmHg  HCO3, Arterial: 28 mmol/L  Base Excess, Arterial: 1.7 mmol/L  Oxygen Saturation, Arterial: 94.1 %  Total CO2, Arterial: 30 mmol/L                            7.8    12.02 )-----------( 382      ( 04 Nov 2023 18:30 )             26.4     11-04    137  |  103  |  21  ----------------------------<  140<H>  3.9   |  26  |  0.99    Ca    8.4      04 Nov 2023 00:40  Phos  3.9     11-04  Mg     2.20     11-04    TPro  6.2  /  Alb  1.9<L>  /  TBili  <0.2  /  DBili  x   /  AST  16  /  ALT  12  /  AlkPhos  230<H>  11-04    proBNP:   Lipid Profile:   HgA1c:   TSH:             TELEMETRY: 	SR ST    ECG:  	  RADIOLOGY:   DIAGNOSTIC TESTING:  [ ] Echocardiogram:  [ ]  Catheterization:  [ ] Stress Test:    OTHER:

## 2023-11-04 NOTE — PROGRESS NOTE ADULT - SUBJECTIVE AND OBJECTIVE BOX
Patient seen and examined at bedside in MICU. Resting in bed. Family at bedside. On contact precautions. + trach.         VITAL:  T(C): , Max: 37.8 (11-03-23 @ 20:00)  T(F): , Max: 100 (11-03-23 @ 20:00)  HR: 118 (11-04-23 @ 11:32)  BP: --  BP(mean): --  RR: 23 (11-04-23 @ 10:00)  SpO2: 100% (11-04-23 @ 11:32)  Wt(kg): --      PHYSICAL EXAM:  Constitutional: critically ill, trach to vent   HEENT: + NGT, + tracheostomy   Respiratory: coarse BS  Cardiovascular: tachy   Gastrointestinal: BS+, soft, NT/ND  Extremities: ++ peripheral edema  Neurological: UTO, Lethargic  : No Wheeler  Skin: No rashes  Access: [facility]       LABS:                        6.4    13.56 )-----------( 383      ( 04 Nov 2023 01:30 )             22.4     Na(137)/K(3.9)/Cl(103)/HCO3(26)/BUN(21)/Cr(0.99)Glu(140)/Ca(8.4)/Mg(2.20)/PO4(3.9)    11-04 @ 00:40  Na(137)/K(4.5)/Cl(102)/HCO3(26)/BUN(27)/Cr(1.28)Glu(101)/Ca(8.6)/Mg(2.30)/PO4(5.2)    11-03 @ 00:10  Na(132)/K(4.1)/Cl(98)/HCO3(26)/BUN(21)/Cr(0.97)Glu(196)/Ca(8.5)/Mg(2.20)/PO4(3.8)    11-02 @ 00:15    Urinalysis Basic - ( 04 Nov 2023 00:40 )    Color: x / Appearance: x / SG: x / pH: x  Gluc: 140 mg/dL / Ketone: x  / Bili: x / Urobili: x   Blood: x / Protein: x / Nitrite: x   Leuk Esterase: x / RBC: x / WBC x   Sq Epi: x / Non Sq Epi: x / Bacteria: x

## 2023-11-04 NOTE — PROGRESS NOTE ADULT - ATTENDING COMMENTS
72 yo F (wheelchair bound prior to admission) with HFpEF, T2DM, CKD, LTBI (s/p tx), hx breast ca (2018, s/p mastectomy and adjuvant CT/RT), and hx prior CVA s/p trach/PEG (decannulated prior to admission, ILR in place) who was initially admitted on 8/11/23 after p/w with acute hypoxemic and hypercapnic respiratory failure thought to be 2/2 Aspiration PNA vs ADHF/flash pulmonary edema in setting of hypertensive emergency requiring intubation and mechanical ventilation.  She had a prolonged ICU admission which was complicated by new right cerebellar, midbrain, and multiple tiny embolic infarcts (known left PCA CVA), UGIB (s/p EGD 8/31 which showed esophagitis/erosive gastropathy now on PPI BID, ASA held), ARF and AIN (s/p prednisone) requiring dialysis (has been on and off during admission), failure to wean from ventilator s/p tracheostomy placement (IP 9/1) and RCU transfer for further management.     In the RCU, her hospital course has also been marked by recurrent infections including MSSA bacteremia and MSSA/ESBL EColi in BAL, L otitis externa (9/5) s/p ENT debridement x2 (last 9/21) c/b concern for superimposed candida infection. She had a return of fever and had a repeat sputum with  PSA on Aztroname (10/16-10/25).  She has had repeated MICU stays for hypotension and multiorgan failure.     She now presents back to the ICU for persistent hypotension without shock and need for vasopressor support with dialysis. She continues to require escalating doses of vasopressor with dialysis (up to 0.25 of Levo, as well as max doses of midodrine and droxidopa)    Her MICU course     Lines:   SHAKIRA Hogan 9/2023    Vent settings: ACPC 24/35/8/35%; -270; Dynamic compliance ~7-8; VBG 7.34/53    N: Metabolic encephalopathy, multiple CVA's, dementia, ? seizure (no focus appreciated on EEG but multiple areas of epileptogenic potential)   - Neuro following, appreciate input  CV: Hypotension, secondary to vasoplegia in the setting of multiple infections, prolonged hospitalization and deconditioning. No evidence of antonina shock.  MAP goal >65  - Plan for vasopressor support with dialysis, with escalating doses with dialysis. Will cap at 0.3  - 600 q 8 droxi and midodrine 40 q 6, with 20 PRN midodrine with dialysis  P: Acute respiratory failure requiring trach and mechanical ventilator support 24/35/8/40 (Volumes ~200)  Elevated airway pressure from dramatically reduced lung compliance, obesity, multifocal pneumonia, pulmonary edema, compressive atalectasis from bilateral effusions, obesity  - Continue ventilator support, minimize pressures as able  R: ARF requiring dialysis  - Ashlee placed 9/27 (planned for IR permcath once free of infection)  - Discussion with renal attg today. We will plan to pursue dialysis with vasopressor support, if unable to tolerate, she will not be a candidate for CRRT or further HD.   GI: History fo UGIB  - No evidence of recurrence  D: ESBL EColi and MSSA VAP c/b MSSA bacteremia  Proteus and  PSA VAP/tracheitis  Panniculitis that is improving  Recurrent thick secretions, leukocytosis--growing PSE  - Resumed on Aztreonam, planned for 10 day course  Heme:   Significant eosinophilia: ? etiology. ANCA negative, strongy negative LFT's normal. ? drug reaction  LLE DVT, RIJ and Brachial DVT  Anemia chronic disease, renal failure  H/O breast cancer  - Transfusion with premedication  Derm: Multiple drug reactions during hospitalization: Cefepime v Ancef (8/2023), Zosyn (9/2023)  - Hold further cephalosporins    Full Code. Multiple goals of care discussions. Remains full code

## 2023-11-04 NOTE — PROGRESS NOTE ADULT - ASSESSMENT
72 y/o F well known to me from my [facility] [place] out[facility] practice. she was admitted at [facility] 7/12-7/22 w aspiration PNA, was treated w CEFEPIME, developed an allergic rash,  dCHF, + MAC on AFB culture, had been progressively getting more and more lethargic and dyspneic at home since DC.   In  am of 8/11/23  ptn presented with respiratory distress w hypoxia and hypercarbia requiring intubation 2/2 volume overload +/- Asp PNA      Neuro   not responsive  Baseline MS AOx3, aphasic   - h/o CVA , on aspirin and statin . resumed w feeding tube, ASA resumed 8/26  - eeg  2/2 tremors, no sz focus, right facial twitching, on KEPPRA  - less responsive in the past few days  - MRI 8/17:  new R cerebellar infarct, old left PCA/Occipital infarct. probably embolic in nature, did not tolerate full AC in the past, STEPHANIE is neg , no shunts observed  - ptn is poorly reactive, rpt scan done, no further CVA seen.     Cardiac   cardiology following  CHFpEF   TTE 7/2023 with EF 59%, with severe LVH and diastolic dysfunction   on Levo drip 2/2 hypotension   on midodrine 40 mg qid, droxidopa 600 tid, additional 200 mg prior to HD,     Pulmonary   Acute hypercapnic and hypoxemic respiratory failure   prolonged intubation, now  trache to  vent, has copious secretions      Renal   on HD via L IJ Shiley, tunneled catheter when clinically stable  HD MWF, midodrine assisted, PUF in between HD days, unable to remove proper volume 2/2 hypotension.   permacath when clinically stable      GI  NPO  on tube feeds  coffee ground emesis x 1 8/24, melena overnight 8/31, s/p 1UPRBC, EGD/Colonoscopy: erosive gastropathy, esophagisits, on colonoscopy old blood seen no active bleeding, poor prep  family to decide re PEG placement    Endocrine  on Insulin: NPH, Admelog    ID   complicated infectious hospital course  treated for MSSA bacteremia, aspiration PNA.  sputum + for pseudomonas, ptn was initially on zosyn but was allergic, then was switched to aztreonam   Aztreonam was switched to polymyxin for a possible allergy but ptn didnt have a reaction to aztreonam, she had a reaction to Zosyn.   spike temps again while off Abx, Aztreonam resumed 10/17, DCed 10/29  tmax 104.6 on 11/2: pancultured, placed on aztreonam and Vancomycin. 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 and she remains full code at present

## 2023-11-05 NOTE — PROGRESS NOTE ADULT - ATTENDING COMMENTS
72 yo F (wheelchair bound prior to admission) with HFpEF, T2DM, CKD, LTBI (s/p tx), hx breast ca (2018, s/p mastectomy and adjuvant CT/RT), and hx prior CVA s/p trach/PEG (decannulated prior to admission, ILR in place) who was initially admitted on 8/11/23 after p/w with acute hypoxemic and hypercapnic respiratory failure thought to be 2/2 Aspiration PNA vs ADHF/flash pulmonary edema in setting of hypertensive emergency requiring intubation and mechanical ventilation.  She had a prolonged ICU admission which was complicated by new right cerebellar, midbrain, and multiple tiny embolic infarcts (known left PCA CVA), UGIB (s/p EGD 8/31 which showed esophagitis/erosive gastropathy now on PPI BID, ASA held), ARF and AIN (s/p prednisone) requiring dialysis (has been on and off during admission), failure to wean from ventilator s/p tracheostomy placement (IP 9/1) and RCU transfer for further management.     In the RCU, her hospital course has also been marked by recurrent infections including MSSA bacteremia and MSSA/ESBL EColi in BAL, L otitis externa (9/5) s/p ENT debridement x2 (last 9/21) c/b concern for superimposed candida infection. She had a return of fever and had a repeat sputum with  PSA on Aztroname (10/16-10/25).  She has had repeated MICU stays for hypotension and multiorgan failure.     She now presents back to the ICU for persistent hypotension without shock and need for vasopressor support with dialysis. She continues to require escalating doses of vasopressor with dialysis (up to 0.25 of Levo, as well as max doses of midodrine and droxidopa)    Her MICU course has been recently complicated by concern for ventilator associated pneumonia and hypercapnic respiratory failure.     Vent settings: ACPC 24/35/8/40%; 's; 7.23/67/82    N: Metabolic encephalopathy, multiple CVA's, dementia, ? seizure (no focus appreciated on EEG but multiple areas of epileptogenic potential)   - Neuro following, appreciate input  CV: Hypotension, secondary to vasoplegia in the setting of multiple infections, prolonged hospitalization and deconditioning. No evidence of antonina shock.  MAP goal >65  - Plan for vasopressor support with dialysis, with escalating doses with dialysis. Will cap at 0.3  - 600 q 8 droxi and midodrine 40 q 6, with 20 PRN midodrine with dialysis  P: Acute respiratory failure requiring trach and mechanical ventilator support 24/35/8/40 (Volumes ~200)  Elevated airway pressure from dramatically reduced lung compliance, obesity, multifocal pneumonia, pulmonary edema, compressive atalectasis from bilateral effusions, obesity  Concerned her hypercapnia is likely from pneumonia and ineffective volume removal with dialysis due to limitations of vasopressor support  - Continue ventilator support, minimize pressures as able  - Try and take off volume with dialysis as she can tolerate  - Abx as outlined below  - Add airway clearance; inhaled bd nebs followed by saline followed by IPV  R: ARF requiring dialysis  - Ashlee placed 10/24  - Plan to pursue dialysis with vasopressor support, if unable to tolerate, she will not be a candidate for CRRT or further HD. Will cap at 0.3 of levo  - Would try and optimize volume removal  GI: History fo UGIB  - No evidence of acute blood loss   - PPI IV BID  - TF at goal  ID: ESBL EColi and MSSA VAP c/b MSSA bacteremia  Proteus and  PSA VAP/tracheitis  Panniculitis that is improving  Recurrent thick secretions, leukocytosis--growing PSE  - Resumed on Aztreonam, planned for 10-14 day course  - Appreciate ID input  - Ear drops for otitis externa  Heme:   Significant eosinophilia: ? etiology. ANCA negative, strongy negative LFT's normal. ? drug reaction  LLE DVT, RIJ and Brachial DVT  Anemia chronic disease, renal failure  H/O breast cancer  - Trend CBC  Derm: Multiple drug reactions during hospitalization: Cefepime v Ancef (8/2023), Zosyn (9/2023)  - Hold further cephalosporins    Full Code. Multiple goals of care discussions throughout her hospitalization. Remains full code

## 2023-11-05 NOTE — PROGRESS NOTE ADULT - SUBJECTIVE AND OBJECTIVE BOX
S:  Pt seen and examined. Eyes more open today        Medications: MEDICATIONS  (STANDING):  albuterol/ipratropium for Nebulization 3 milliLiter(s) Nebulizer every 6 hours  apixaban 5 milliGRAM(s) Oral every 12 hours  artificial tears (preservative free) Ophthalmic Solution 1 Drop(s) Both EYES every 4 hours  atorvastatin 10 milliGRAM(s) Oral at bedtime  aztreonam  IVPB 2000 milliGRAM(s) IV Intermittent <User Schedule>  chlorhexidine 0.12% Liquid 15 milliLiter(s) Oral Mucosa every 12 hours  chlorhexidine 2% Cloths 1 Application(s) Topical daily  ciprofloxacin/hydrocortisone Suspension Otic 4 Drop(s) Left Ear two times a day  dextrose 5%. 1000 milliLiter(s) (100 mL/Hr) IV Continuous <Continuous>  dextrose 5%. 1000 milliLiter(s) (50 mL/Hr) IV Continuous <Continuous>  dextrose 50% Injectable 25 Gram(s) IV Push once  dextrose 50% Injectable 12.5 Gram(s) IV Push once  dextrose 50% Injectable 25 Gram(s) IV Push once  dextrose 50% Injectable 25 Gram(s) IV Push once  droxidopa 600 milliGRAM(s) Oral every 8 hours  epoetin odalis (EPOGEN) Injectable 76963 Unit(s) IV Push <User Schedule>  ferrous    sulfate Liquid 300 milliGRAM(s) Oral daily  glucagon  Injectable 1 milliGRAM(s) IntraMuscular once  insulin lispro (ADMELOG) corrective regimen sliding scale   SubCutaneous every 6 hours  insulin NPH human recombinant 6 Unit(s) SubCutaneous every 6 hours  levETIRAcetam  Solution 1000 milliGRAM(s) Oral daily  levETIRAcetam  Solution 250 milliGRAM(s) Oral <User Schedule>  midodrine. 40 milliGRAM(s) Oral <User Schedule>  norepinephrine Infusion 0.05 MICROgram(s)/kG/Min (6.23 mL/Hr) IV Continuous <Continuous>  pantoprazole  Injectable 40 milliGRAM(s) IV Push two times a day  petrolatum Ophthalmic Ointment 1 Application(s) Both EYES four times a day  sodium chloride 1 Gram(s) Oral two times a day  sodium chloride 3%  Inhalation 4 milliLiter(s) Inhalation every 6 hours    MEDICATIONS  (PRN):  acetaminophen   Oral Liquid .. 650 milliGRAM(s) Oral every 6 hours PRN Temp greater or equal to 38C (100.4F), Mild Pain (1 - 3)  dextrose Oral Gel 15 Gram(s) Oral once PRN Blood Glucose LESS THAN 70 milliGRAM(s)/deciliter  diphenhydrAMINE Elixir 50 milliGRAM(s) Oral once PRN Rash and/or Itching  droxidopa 200 milliGRAM(s) Oral <User Schedule> PRN Prior to hemodialysis  midodrine. 20 milliGRAM(s) Oral <User Schedule> PRN 1 Hour Pre HD  sodium chloride 0.9% Bolus. 250 milliLiter(s) IV Bolus every 5 minutes PRN SBP LESS THAN or EQUAL to 90 mmHg  sodium chloride 0.9% lock flush 10 milliLiter(s) IV Push every 1 hour PRN Pre/post blood products, medications, blood draw, and to maintain line patency       Vitals:  Vital Signs Last 24 Hrs  T(C): 36.9 (05 Nov 2023 12:00), Max: 37.3 (05 Nov 2023 04:00)  T(F): 98.5 (05 Nov 2023 12:00), Max: 99.2 (05 Nov 2023 04:00)  HR: 114 (05 Nov 2023 16:14) (114 - 122)  BP: --  BP(mean): --  RR: 24 (05 Nov 2023 16:00) (16 - 31)  SpO2: 100% (05 Nov 2023 16:14) (94% - 100%)    Parameters below as of 05 Nov 2023 16:11  Patient On (Oxygen Delivery Method): ventilator                Neurological Exam:  Mental Status: Eyes closed, minimal spont eye opening off sedation. Does not follow commands,  + trach to vent and NGT   Cranial Nerves: PERRL slightly sluggish, R facial twitching noted by mouth lessened today- ? suppressible. occasional head dystonia  Motor: Moves upper extremities spontaneously R >L, tremor R > L  no movement noted in lowers  Sensation: WD to noxious x4    I personally reviewed the below data/images/labs:    LABS:                          9.1    13.19 )-----------( 473      ( 05 Nov 2023 01:04 )             30.3     11-05    136  |  101  |  29<H>  ----------------------------<  178<H>  5.0   |  25  |  1.24    Ca    8.8      05 Nov 2023 01:04  Phos  5.2     11-05  Mg     2.40     11-05    TPro  6.6  /  Alb  2.2<L>  /  TBili  0.2  /  DBili  x   /  AST  21  /  ALT  11  /  AlkPhos  268<H>  11-05    LIVER FUNCTIONS - ( 05 Nov 2023 01:04 )  Alb: 2.2 g/dL / Pro: 6.6 g/dL / ALK PHOS: 268 U/L / ALT: 11 U/L / AST: 21 U/L / GGT: x           PTT - ( 05 Nov 2023 01:04 )  PTT:34.6 sec  Urinalysis Basic - ( 05 Nov 2023 01:04 )    Color: x / Appearance: x / SG: x / pH: x  Gluc: 178 mg/dL / Ketone: x  / Bili: x / Urobili: x   Blood: x / Protein: x / Nitrite: x   Leuk Esterase: x / RBC: x / WBC x   Sq Epi: x / Non Sq Epi: x / Bacteria: x            < from: CT Head No Cont (08.15.23 @ 17:20) >    ACC: [id number] EXAM:  CT BRAIN   ORDERED BY: MINAL SAM     PROCEDURE DATE:  08/15/2023          INTERPRETATION:  Clinical indication: Change in neuro exam.    Multiple axial sections were performed from base to vertex without   contrast enhancement. Coronal and sagittal reconstructions were   reformatted well.    This exam is compared prior head CT performed on August 11, 2023    Parenchymal volume loss and chronic microvessel ischemic changes are   again seen.    Abnormal low-attenuation involving the left occipital cortical   subcortical region is again seen. 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    No evidence of acute hemorrhage mass or mass effect is seen.    Evaluation of the osseous structures with appropriate window demonstrates   sclerotic changes about the left mastoid region which appears stable.   Opacification left middle ear region is again seen.    Patient is status post bilateral cataract surgery.    Impression: Stable exam.    < end of copied text >    vEEG:    Clinical Impression:  - Severe diffuse non-specific cerebral dysfunction  - There were no epileptiform abnormalities or seizures recorded.        CTH 8/11:    VENTRICLES AND SULCI: Age-appropriate involutional change  INTRA-AXIAL:  Old left PCA infarct as seen on the prior unchanged.   Microvascular ischemic changes involving the periventricular and   subcortical white matter as seen previously  EXTRA-AXIAL:  No mass or collection is seen.  VISUALIZED SINUSES:  Clear.  VISUALIZED MASTOIDS: Left mastoid sclerosis  CALVARIUM: Infiltrative appearance tothe calvarium may be indicative of   marrow infiltration on the basis of patient's known diagnosis of breast   cancer. MISCELLANEOUS:  None.    IMPRESSION:  No significant interval change compared with 7/17/2023 in   left PCA infarct which is old. Microvascular ischemic changes involving   the periventricular and subcortical white matter as seen   previously.Questionable lesions at the level of the calvarium related to   possible breast CA. Clinical correlation recommended.    --- End of Report ---      ct< from: CT Head No Cont (09.26.23 @ 21:02) >  IMPRESSION: Vague questionable areas of hypoattenuation within the   bilateral cerebellar hemispheres which may be compatible with   acute/subacute ischemia. Recommend further evaluation with a brain MRI   study, provided there are no MRI contraindications.    No acute intracranial hemorrhage.    Previously seen questionable vague wedge-shaped area of hypoattenuation   in the left parietal lobe with artifactual secondary to motion and volume   averaging with a prominent sulcus.    Chronic left occipital lobe infarct.    < end of copied text >    < from: CT Head No Cont (10.03.23 @ 20:46) >    IMPRESSION:    No acute intracranial hemorrhage or mass effect. Evaluation of the   posterior fossa degraded by streak artifact from dental amalgam.   Lucencies in the bilateral cerebellum may be artifactual.    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    Bilateral middle ear and mastoid effusions which are also seen on prior   exam.    EEG Classification / Summary:  Abnormal EEG in the awake, drowsy states.   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   -No seizures

## 2023-11-05 NOTE — PROGRESS NOTE ADULT - ASSESSMENT
72 y/o F well known to me from my [facility] outhospitals practice. she was admitted at [facility] 7/12-7/22 w aspiration PNA, was treated w CEFEPIME, developed an allergic rash,  dCHF, + MAC on AFB culture, had been progressively getting more and more lethargic and dyspneic at home since DC.   In  am of 8/11/23  ptn presented with respiratory distress w hypoxia and hypercarbia requiring intubation 2/2 volume overload +/- Asp PNA      Neuro   not responsive  Baseline MS AOx3, aphasic   - h/o CVA , on aspirin and statin . resumed w feeding tube, ASA resumed 8/26  - eeg  2/2 tremors, no sz focus, right facial twitching, on KEPPRA  - less responsive in the past few days  - MRI 8/17:  new R cerebellar infarct, old left PCA/Occipital infarct. probably embolic in nature, did not tolerate full AC in the past, STEPHANIE is neg , no shunts observed  - ptn is poorly reactive, rpt scan done, no further CVA seen.     Cardiac   cardiology following  CHFpEF   TTE 7/2023 with EF 59%, with severe LVH and diastolic dysfunction   on Levo drip 2/2 hypotension   on midodrine 40 mg qid, droxidopa 600 tid, additional 200 mg prior to HD,     Pulmonary   Acute hypercapnic and hypoxemic respiratory failure   prolonged intubation, now  trache to  vent, has copious secretions      Renal   on HD via L IJ [place], tunneled catheter when clinically stable  HD MWF, midodrine assisted, PUF in between HD days, unable to remove proper volume 2/2 hypotension.   permacath when clinically stable      GI  NPO  on tube feeds  UGI bleed 8/31,  EGD/Colonoscopy: erosive gastropathy, esophagisits, on colonoscopy old blood seen no active bleeding, poor prep  NGT-TF  s/p 2UPRBC 11/4    Endocrine  on Insulin: NPH, Admelog    ID   complicated infectious hospital course  treated for MSSA bacteremia, aspiration PNA.  sputum + for pseudomonas, ptn was initially on zosyn but was allergic, then was switched to aztreonam   Aztreonam was switched to polymyxin for a possible allergy but ptn didnt have a reaction to aztreonam, she had a reaction to Zosyn.   spike temps again while off Abx, Aztreonam resumed 10/17, DCed 10/29  tmax 104.6 on 11/2: pancultured,   placed on aztreonam and Vancomycin. 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 externa resolving w drops    Heme/Onc  on eliquis for  LEFT POPLITEAL VEIN ACUTE DVT , on ELiquis    Ethics  GOC - Discussed GOC with daughter and , they have opted in the past for full code. and she remains full code at present

## 2023-11-05 NOTE — PROGRESS NOTE ADULT - SUBJECTIVE AND OBJECTIVE BOX
CONSULT PROGRESS NOTE    Patient seen and examined at bedside. On ciprodex ggt to left ear BID w/ improving discharge.     T(C): 36.9 (11-05-23 @ 12:00), Max: 37.3 (11-05-23 @ 04:00)  HR: 115 (11-05-23 @ 12:00) (114 - 122)  BP: --  RR: 24 (11-05-23 @ 12:00) (16 - 31)  SpO2: 96% (11-05-23 @ 12:00) (94% - 100%)    Intubated/sedated  AU: L pinna w/ improved edema, mild excoriation along conchal bowl, covered in balitracin. EAC w/o edema or drainage, limited view of TM w/ likely perf.   AD: unremarkable.    A/P:    73 year old female with a hx HTN, DM, CVA, admitted for respiratory distress, prolonged intubation on vent. Patient was previously evaluated by ENT for left OE. Had a wick placed and was treated with IV abx and drops. ENT signed off several weeks ago after improvement. Now having left ear drainage and excoriation to left Auricle. Trach + vent dependent. Infection markedly improved.  - Continue ciprodex drops for total 10 day course  - Bacitracin to pinna adn conchal bowl daily

## 2023-11-05 NOTE — PROGRESS NOTE ADULT - SUBJECTIVE AND OBJECTIVE BOX
Patient seen and examined in bed with family at [facility]. Remains intubated and sedated. S/p 2 units PRBC yesterday no distress.        VITAL:  T(C): , Max: 37.8 (11-03-23 @ 20:00)  T(F): , Max: 100 (11-03-23 @ 20:00)  HR: 118 (11-04-23 @ 11:32)  BP: --  BP(mean): --  RR: 23 (11-04-23 @ 10:00)  SpO2: 100% (11-04-23 @ 11:32)  Wt(kg): --      PHYSICAL EXAM:  Constitutional: critically ill, trach to vent   HEENT: + NGT, + tracheostomy   Respiratory: coarse BS  Cardiovascular: tachy   Gastrointestinal: BS+, soft, NT/ND  Extremities: ++ peripheral edema  Neurological: UTO  : No Wheeler  Skin: No rashes  Access: [facility]       LABS:                        6.4    13.56 )-----------( 383      ( 04 Nov 2023 01:30 )             22.4     Na(137)/K(3.9)/Cl(103)/HCO3(26)/BUN(21)/Cr(0.99)Glu(140)/Ca(8.4)/Mg(2.20)/PO4(3.9)    11-04 @ 00:40  Na(137)/K(4.5)/Cl(102)/HCO3(26)/BUN(27)/Cr(1.28)Glu(101)/Ca(8.6)/Mg(2.30)/PO4(5.2)    11-03 @ 00:10  Na(132)/K(4.1)/Cl(98)/HCO3(26)/BUN(21)/Cr(0.97)Glu(196)/Ca(8.5)/Mg(2.20)/PO4(3.8)    11-02 @ 00:15    Urinalysis Basic - ( 04 Nov 2023 00:40 )    Color: x / Appearance: x / SG: x / pH: x  Gluc: 140 mg/dL / Ketone: x  / Bili: x / Urobili: x   Blood: x / Protein: x / Nitrite: x   Leuk Esterase: x / RBC: x / WBC x   Sq Epi: x / Non Sq Epi: x / Bacteria: x

## 2023-11-05 NOTE — PROGRESS NOTE ADULT - SUBJECTIVE AND OBJECTIVE BOX
INTERVAL HPI/OVERNIGHT EVENTS:    SUBJECTIVE: Patient seen and examined at bedside.    OBJECTIVE:    VITAL SIGNS:  ICU Vital Signs Last 24 Hrs  T(C): 37.3 (05 Nov 2023 04:00), Max: 37.3 (05 Nov 2023 04:00)  T(F): 99.2 (05 Nov 2023 04:00), Max: 99.2 (05 Nov 2023 04:00)  HR: 116 (05 Nov 2023 07:00) (111 - 121)  BP: --  BP(mean): --  ABP: 129/51 (05 Nov 2023 07:00) (112/45 - 134/59)  ABP(mean): 84 (05 Nov 2023 07:00) (74 - 266)  RR: 21 (05 Nov 2023 07:00) (16 - 27)  SpO2: 99% (05 Nov 2023 07:00) (94% - 100%)    O2 Parameters below as of 05 Nov 2023 06:00  Patient On (Oxygen Delivery Method): ventilator    O2 Concentration (%): 40      Mode: AC/ CMV (Assist Control/ Continuous Mandatory Ventilation), RR (machine): 24, FiO2: 40, PEEP: 8, ITime: 0.8, MAP: 20, PC: 35, PIP: 43    11-04 @ 08:01  -  11-05 @ 07:00  --------------------------------------------------------  IN: 1640 mL / OUT: 150 mL / NET: 1490 mL      CAPILLARY BLOOD GLUCOSE      POCT Blood Glucose.: 155 mg/dL (05 Nov 2023 05:07)      PHYSICAL EXAM:    General: NAD  HEENT: NC/AT; PERRL, clear conjunctiva  Neck: supple  Respiratory: CTA b/l  Cardiovascular: +S1/S2; RRR  Abdomen: soft, NT/ND; +BS x4  Extremities:  no LE edema  Vascular: WWP, 2+ peripheral pulses b/l;  Skin: normal color and turgor; no rash  Neurological: A&Ox3, move all extremities. CN II-XII intact    MEDICATIONS:  MEDICATIONS  (STANDING):  albuterol    0.083% 2.5 milliGRAM(s) Nebulizer every 6 hours  apixaban 5 milliGRAM(s) Oral every 12 hours  artificial tears (preservative free) Ophthalmic Solution 1 Drop(s) Both EYES every 4 hours  atorvastatin 10 milliGRAM(s) Oral at bedtime  chlorhexidine 0.12% Liquid 15 milliLiter(s) Oral Mucosa every 12 hours  chlorhexidine 2% Cloths 1 Application(s) Topical daily  ciprofloxacin/hydrocortisone Suspension Otic 4 Drop(s) Left Ear two times a day  dextrose 5%. 1000 milliLiter(s) (50 mL/Hr) IV Continuous <Continuous>  dextrose 5%. 1000 milliLiter(s) (100 mL/Hr) IV Continuous <Continuous>  dextrose 50% Injectable 25 Gram(s) IV Push once  dextrose 50% Injectable 12.5 Gram(s) IV Push once  dextrose 50% Injectable 25 Gram(s) IV Push once  dextrose 50% Injectable 25 Gram(s) IV Push once  droxidopa 600 milliGRAM(s) Oral every 8 hours  epoetin odalis (EPOGEN) Injectable 02255 Unit(s) IV Push <User Schedule>  ferrous    sulfate Liquid 300 milliGRAM(s) Oral daily  glucagon  Injectable 1 milliGRAM(s) IntraMuscular once  insulin lispro (ADMELOG) corrective regimen sliding scale   SubCutaneous every 6 hours  insulin NPH human recombinant 6 Unit(s) SubCutaneous every 6 hours  levETIRAcetam  Solution 1000 milliGRAM(s) Oral daily  levETIRAcetam  Solution 250 milliGRAM(s) Oral <User Schedule>  midodrine. 40 milliGRAM(s) Oral <User Schedule>  norepinephrine Infusion 0.05 MICROgram(s)/kG/Min (6.23 mL/Hr) IV Continuous <Continuous>  pantoprazole  Injectable 40 milliGRAM(s) IV Push two times a day  petrolatum Ophthalmic Ointment 1 Application(s) Both EYES four times a day  sodium chloride 1 Gram(s) Oral two times a day    MEDICATIONS  (PRN):  acetaminophen   Oral Liquid .. 650 milliGRAM(s) Oral every 6 hours PRN Temp greater or equal to 38C (100.4F), Mild Pain (1 - 3)  dextrose Oral Gel 15 Gram(s) Oral once PRN Blood Glucose LESS THAN 70 milliGRAM(s)/deciliter  diphenhydrAMINE Elixir 50 milliGRAM(s) Oral once PRN Rash and/or Itching  droxidopa 200 milliGRAM(s) Oral <User Schedule> PRN Prior to hemodialysis  midodrine. 20 milliGRAM(s) Oral <User Schedule> PRN 1 Hour Pre HD  sodium chloride 0.9% Bolus. 250 milliLiter(s) IV Bolus every 5 minutes PRN SBP LESS THAN or EQUAL to 90 mmHg  sodium chloride 0.9% lock flush 10 milliLiter(s) IV Push every 1 hour PRN Pre/post blood products, medications, blood draw, and to maintain line patency      ALLERGIES:  Allergies    isoniazid (Rash)  nafcillin (Unknown)  hydrALAZINE (Rash)  vitamin E (Short breath; Urticaria; Hives)  doxycycline (Rash)  cefepime (Rash)  NIFEdipine (Urticaria; Hives)  Zosyn (Rash)    Intolerances        LABS:                        9.1    13.19 )-----------( 473      ( 05 Nov 2023 01:04 )             30.3     11-05    136  |  101  |  29<H>  ----------------------------<  178<H>  5.0   |  25  |  1.24    Ca    8.8      05 Nov 2023 01:04  Phos  5.2     11-05  Mg     2.40     11-05    TPro  6.6  /  Alb  2.2<L>  /  TBili  0.2  /  DBili  x   /  AST  21  /  ALT  11  /  AlkPhos  268<H>  11-05    PTT - ( 05 Nov 2023 01:04 )  PTT:34.6 sec  Urinalysis Basic - ( 05 Nov 2023 01:04 )    Color: x / Appearance: x / SG: x / pH: x  Gluc: 178 mg/dL / Ketone: x  / Bili: x / Urobili: x   Blood: x / Protein: x / Nitrite: x   Leuk Esterase: x / RBC: x / WBC x   Sq Epi: x / Non Sq Epi: x / Bacteria: x        RADIOLOGY & ADDITIONAL TESTS: Reviewed. INTERVAL HPI/OVERNIGHT EVENTS: LORENZA OVN    SUBJECTIVE: Patient seen and examined at bedside. Spoke with jean-paul at bedside. Patient unresponsive to verbal and tactile stimuli.     OBJECTIVE:    VITAL SIGNS:  ICU Vital Signs Last 24 Hrs  T(C): 37.3 (05 Nov 2023 04:00), Max: 37.3 (05 Nov 2023 04:00)  T(F): 99.2 (05 Nov 2023 04:00), Max: 99.2 (05 Nov 2023 04:00)  HR: 116 (05 Nov 2023 07:00) (111 - 121)  BP: --  BP(mean): --  ABP: 129/51 (05 Nov 2023 07:00) (112/45 - 134/59)  ABP(mean): 84 (05 Nov 2023 07:00) (74 - 266)  RR: 21 (05 Nov 2023 07:00) (16 - 27)  SpO2: 99% (05 Nov 2023 07:00) (94% - 100%)    O2 Parameters below as of 05 Nov 2023 06:00  Patient On (Oxygen Delivery Method): ventilator    O2 Concentration (%): 40      Mode: AC/ CMV (Assist Control/ Continuous Mandatory Ventilation), RR (machine): 24, FiO2: 40, PEEP: 8, ITime: 0.8, MAP: 20, PC: 35, PIP: 43    11-04 @ 08:01  -  11-05 @ 07:00  --------------------------------------------------------  IN: 1640 mL / OUT: 150 mL / NET: 1490 mL      CAPILLARY BLOOD GLUCOSE      POCT Blood Glucose.: 155 mg/dL (05 Nov 2023 05:07)      PHYSICAL EXAM:    General: NAD, intubated off sedation   HEENT: NC/AT; PERRL, clear conjunctiva  Neck: supple  Respiratory: Rhonchi breath sounds bilaterally   Cardiovascular: +S1/S2; RRR  Abdomen: soft, NT/ND; +BS x4  Extremities:  3+ LE pitting edema   Vascular: WWP, 2+ peripheral pulses b/l;  Skin: normal color and turgor; no rash  Neurological: A&Ox0, nonresponsive to verbal or tactile stimuli    MEDICATIONS:  MEDICATIONS  (STANDING):  albuterol    0.083% 2.5 milliGRAM(s) Nebulizer every 6 hours  apixaban 5 milliGRAM(s) Oral every 12 hours  artificial tears (preservative free) Ophthalmic Solution 1 Drop(s) Both EYES every 4 hours  atorvastatin 10 milliGRAM(s) Oral at bedtime  chlorhexidine 0.12% Liquid 15 milliLiter(s) Oral Mucosa every 12 hours  chlorhexidine 2% Cloths 1 Application(s) Topical daily  ciprofloxacin/hydrocortisone Suspension Otic 4 Drop(s) Left Ear two times a day  dextrose 5%. 1000 milliLiter(s) (50 mL/Hr) IV Continuous <Continuous>  dextrose 5%. 1000 milliLiter(s) (100 mL/Hr) IV Continuous <Continuous>  dextrose 50% Injectable 25 Gram(s) IV Push once  dextrose 50% Injectable 12.5 Gram(s) IV Push once  dextrose 50% Injectable 25 Gram(s) IV Push once  dextrose 50% Injectable 25 Gram(s) IV Push once  droxidopa 600 milliGRAM(s) Oral every 8 hours  epoetin odalis (EPOGEN) Injectable 77704 Unit(s) IV Push <User Schedule>  ferrous    sulfate Liquid 300 milliGRAM(s) Oral daily  glucagon  Injectable 1 milliGRAM(s) IntraMuscular once  insulin lispro (ADMELOG) corrective regimen sliding scale   SubCutaneous every 6 hours  insulin NPH human recombinant 6 Unit(s) SubCutaneous every 6 hours  levETIRAcetam  Solution 1000 milliGRAM(s) Oral daily  levETIRAcetam  Solution 250 milliGRAM(s) Oral <User Schedule>  midodrine. 40 milliGRAM(s) Oral <User Schedule>  norepinephrine Infusion 0.05 MICROgram(s)/kG/Min (6.23 mL/Hr) IV Continuous <Continuous>  pantoprazole  Injectable 40 milliGRAM(s) IV Push two times a day  petrolatum Ophthalmic Ointment 1 Application(s) Both EYES four times a day  sodium chloride 1 Gram(s) Oral two times a day    MEDICATIONS  (PRN):  acetaminophen   Oral Liquid .. 650 milliGRAM(s) Oral every 6 hours PRN Temp greater or equal to 38C (100.4F), Mild Pain (1 - 3)  dextrose Oral Gel 15 Gram(s) Oral once PRN Blood Glucose LESS THAN 70 milliGRAM(s)/deciliter  diphenhydrAMINE Elixir 50 milliGRAM(s) Oral once PRN Rash and/or Itching  droxidopa 200 milliGRAM(s) Oral <User Schedule> PRN Prior to hemodialysis  midodrine. 20 milliGRAM(s) Oral <User Schedule> PRN 1 Hour Pre HD  sodium chloride 0.9% Bolus. 250 milliLiter(s) IV Bolus every 5 minutes PRN SBP LESS THAN or EQUAL to 90 mmHg  sodium chloride 0.9% lock flush 10 milliLiter(s) IV Push every 1 hour PRN Pre/post blood products, medications, blood draw, and to maintain line patency      ALLERGIES:  Allergies    isoniazid (Rash)  nafcillin (Unknown)  hydrALAZINE (Rash)  vitamin E (Short breath; Urticaria; Hives)  doxycycline (Rash)  cefepime (Rash)  NIFEdipine (Urticaria; Hives)  Zosyn (Rash)    Intolerances        LABS:                        9.1    13.19 )-----------( 473      ( 05 Nov 2023 01:04 )             30.3     11-05    136  |  101  |  29<H>  ----------------------------<  178<H>  5.0   |  25  |  1.24    Ca    8.8      05 Nov 2023 01:04  Phos  5.2     11-05  Mg     2.40     11-05    TPro  6.6  /  Alb  2.2<L>  /  TBili  0.2  /  DBili  x   /  AST  21  /  ALT  11  /  AlkPhos  268<H>  11-05    PTT - ( 05 Nov 2023 01:04 )  PTT:34.6 sec  Urinalysis Basic - ( 05 Nov 2023 01:04 )    Color: x / Appearance: x / SG: x / pH: x  Gluc: 178 mg/dL / Ketone: x  / Bili: x / Urobili: x   Blood: x / Protein: x / Nitrite: x   Leuk Esterase: x / RBC: x / WBC x   Sq Epi: x / Non Sq Epi: x / Bacteria: x        RADIOLOGY & ADDITIONAL TESTS: Reviewed.

## 2023-11-05 NOTE — PROGRESS NOTE ADULT - ASSESSMENT
IMPRESSION: 73F w/ HTN, DM2, CVA, breast CA-bilateral mastectomy, recurrent aspiration pneumonia/respiratory failure, and CKD, 8/11/23 p/w acute hypercapnic respiratory failure; c/b RUSS    (1)Renal - RUSS on CKD4 ==>now newly ESRD. S/p HD 11/3. Subsequent HD tomorrow    (2)Hyponatremia - Na+ improved although slightly lower today    (3)CV- tenuous hemodynamics such with each passing week we have more difficulty performing HD/achieving UF, persistent issue. BP stable with [place], though continues to need pressors for HD    (4)ID- BCx from 10/14/23 NGTD, BC (10/17) NGTD on IV abx     (5)Pulm- trach/vent-dependent    (6)Anemia- hgb improved, s/p 2 units PRBC transfusion yesterday epo with HD    RECOMMEND:  (1)HD tomorrow: # hrs to 2 kg as tolerated   - Epogen with HD   (2)Dose new meds for intermittent HD  (3) Northera and Midodrine prior to HD as needed  (4) PRBC's per Primary    Wayne Valdez NP-c  [facility]  [phone]          IMPRESSION: 73F w/ HTN, DM2, CVA, breast CA-bilateral mastectomy, recurrent aspiration pneumonia/respiratory failure, and CKD, 8/11/23 p/w acute hypercapnic respiratory failure; c/b RUSS    (1)Renal - RUSS on CKD4 ==>now newly ESRD. S/p HD 11/3. Subsequent HD tomorrow    (2)Hyponatremia - Na+ improved although slightly lower today    (3)CV- tenuous hemodynamics such with each passing week we have more difficulty performing HD/achieving UF, persistent issue. BP stable with [place], though continues to need pressors for HD    (4)ID- BCx from 10/14/23 NGTD, BC (10/17) NGTD on IV abx     (5)Pulm- trach/vent-dependent    (6)Anemia- hgb improved, s/p 2 units PRBC transfusion yesterday epo with HD    RECOMMEND:  (1)HD tomorrow: 3 hrs to 2 kg as tolerated   - Epogen with HD, sodium modeling 145-140  (2)Dose new meds for intermittent HD  (3) Northera and Midodrine prior to HD as needed  (4) PRBC's per Primary    Wayne Valdez NP-c  [facility]  [phone]

## 2023-11-05 NOTE — PROGRESS NOTE ADULT - SUBJECTIVE AND OBJECTIVE BOX
Patient is a 73y old  Female who presents with a chief complaint of Respiratory distress (05 Nov 2023 16:46)      SUBJECTIVE / OVERNIGHT EVENTS: trache to vent , not responsive, s/p 2UPRBC 11/4. on Aztreonam, source possible abd wall panniculitis    MEDICATIONS  (STANDING):  albuterol/ipratropium for Nebulization 3 milliLiter(s) Nebulizer every 6 hours  apixaban 5 milliGRAM(s) Oral every 12 hours  artificial tears (preservative free) Ophthalmic Solution 1 Drop(s) Both EYES every 4 hours  atorvastatin 10 milliGRAM(s) Oral at bedtime  aztreonam  IVPB 2000 milliGRAM(s) IV Intermittent <User Schedule>  chlorhexidine 0.12% Liquid 15 milliLiter(s) Oral Mucosa every 12 hours  chlorhexidine 2% Cloths 1 Application(s) Topical daily  ciprofloxacin/hydrocortisone Suspension Otic 4 Drop(s) Left Ear two times a day  dextrose 5%. 1000 milliLiter(s) (50 mL/Hr) IV Continuous <Continuous>  dextrose 5%. 1000 milliLiter(s) (100 mL/Hr) IV Continuous <Continuous>  dextrose 50% Injectable 25 Gram(s) IV Push once  dextrose 50% Injectable 12.5 Gram(s) IV Push once  dextrose 50% Injectable 25 Gram(s) IV Push once  dextrose 50% Injectable 25 Gram(s) IV Push once  droxidopa 600 milliGRAM(s) Oral every 8 hours  epoetin odalis (EPOGEN) Injectable 67424 Unit(s) IV Push <User Schedule>  ferrous    sulfate Liquid 300 milliGRAM(s) Oral daily  glucagon  Injectable 1 milliGRAM(s) IntraMuscular once  insulin lispro (ADMELOG) corrective regimen sliding scale   SubCutaneous every 6 hours  insulin NPH human recombinant 6 Unit(s) SubCutaneous every 6 hours  levETIRAcetam  Solution 1000 milliGRAM(s) Oral daily  levETIRAcetam  Solution 250 milliGRAM(s) Oral <User Schedule>  midodrine. 40 milliGRAM(s) Oral <User Schedule>  norepinephrine Infusion 0.05 MICROgram(s)/kG/Min (6.23 mL/Hr) IV Continuous <Continuous>  pantoprazole  Injectable 40 milliGRAM(s) IV Push two times a day  petrolatum Ophthalmic Ointment 1 Application(s) Both EYES four times a day  sodium chloride 1 Gram(s) Oral two times a day  sodium chloride 3%  Inhalation 4 milliLiter(s) Inhalation every 6 hours    MEDICATIONS  (PRN):  acetaminophen   Oral Liquid .. 650 milliGRAM(s) Oral every 6 hours PRN Temp greater or equal to 38C (100.4F), Mild Pain (1 - 3)  dextrose Oral Gel 15 Gram(s) Oral once PRN Blood Glucose LESS THAN 70 milliGRAM(s)/deciliter  diphenhydrAMINE Elixir 50 milliGRAM(s) Oral once PRN Rash and/or Itching  droxidopa 200 milliGRAM(s) Oral <User Schedule> PRN Prior to hemodialysis  midodrine. 20 milliGRAM(s) Oral <User Schedule> PRN 1 Hour Pre HD  sodium chloride 0.9% Bolus. 250 milliLiter(s) IV Bolus every 5 minutes PRN SBP LESS THAN or EQUAL to 90 mmHg  sodium chloride 0.9% lock flush 10 milliLiter(s) IV Push every 1 hour PRN Pre/post blood products, medications, blood draw, and to maintain line patency      Vital Signs Last 24 Hrs  T(F): 98.9 (11-05-23 @ 16:00), Max: 99.2 (11-05-23 @ 04:00)  HR: 118 (11-05-23 @ 18:00) (113 - 122)  BP: --  RR: 25 (11-05-23 @ 18:00) (20 - 31)  SpO2: 100% (11-05-23 @ 18:00) (94% - 100%)  Telemetry:   CAPILLARY BLOOD GLUCOSE      POCT Blood Glucose.: 142 mg/dL (05 Nov 2023 17:43)  POCT Blood Glucose.: 162 mg/dL (05 Nov 2023 11:50)  POCT Blood Glucose.: 155 mg/dL (05 Nov 2023 05:07)  POCT Blood Glucose.: 174 mg/dL (04 Nov 2023 23:03)    I&O's Summary    04 Nov 2023 08:01  -  05 Nov 2023 07:00  --------------------------------------------------------  IN: 1640 mL / OUT: 150 mL / NET: 1490 mL    05 Nov 2023 07:01  -  05 Nov 2023 19:11  --------------------------------------------------------  IN: 590 mL / OUT: 0 mL / NET: 590 mL        PHYSICAL EXAM:  GENERAL: NAD, well-developed  HEAD:  Atraumatic, Normocephalic  EYES: EOMI, PERRLA, conjunctiva and sclera clear  NECK: Supple, No JVD  CHEST/LUNG: Clear to auscultation bilaterally; No wheeze  HEART: Regular rate and rhythm; No murmurs, rubs, or gallops  ABDOMEN: Soft, Nontender, Nondistended; Bowel sounds present  EXTREMITIES:  2+ Peripheral Pulses, No clubbing, cyanosis, or edema  PSYCH: AAOx3  NEUROLOGY: non-focal  SKIN: No rashes or lesions    LABS:                        9.1    13.19 )-----------( 473      ( 05 Nov 2023 01:04 )             30.3     11-05    136  |  101  |  29<H>  ----------------------------<  178<H>  5.0   |  25  |  1.24    Ca    8.8      05 Nov 2023 01:04  Phos  5.2     11-05  Mg     2.40     11-05    TPro  6.6  /  Alb  2.2<L>  /  TBili  0.2  /  DBili  x   /  AST  21  /  ALT  11  /  AlkPhos  268<H>  11-05    PTT - ( 05 Nov 2023 01:04 )  PTT:34.6 sec      Urinalysis Basic - ( 05 Nov 2023 01:04 )    Color: x / Appearance: x / SG: x / pH: x  Gluc: 178 mg/dL / Ketone: x  / Bili: x / Urobili: x   Blood: x / Protein: x / Nitrite: x   Leuk Esterase: x / RBC: x / WBC x   Sq Epi: x / Non Sq Epi: x / Bacteria: x        RADIOLOGY & ADDITIONAL TESTS:    Imaging Personally Reviewed:    Consultant(s) Notes Reviewed:      Care Discussed with Consultants/Other Providers:

## 2023-11-05 NOTE — PROGRESS NOTE ADULT - SUBJECTIVE AND OBJECTIVE BOX
Subjective: Patient seen and examined. No new events except as noted.   remains in ICU     REVIEW OF SYSTEMS:    Unable to obtain     MEDICATIONS:  MEDICATIONS  (STANDING):  albuterol    0.083% 2.5 milliGRAM(s) Nebulizer every 6 hours  apixaban 5 milliGRAM(s) Oral every 12 hours  artificial tears (preservative free) Ophthalmic Solution 1 Drop(s) Both EYES every 4 hours  atorvastatin 10 milliGRAM(s) Oral at bedtime  chlorhexidine 0.12% Liquid 15 milliLiter(s) Oral Mucosa every 12 hours  chlorhexidine 2% Cloths 1 Application(s) Topical daily  ciprofloxacin/hydrocortisone Suspension Otic 4 Drop(s) Left Ear two times a day  dextrose 5%. 1000 milliLiter(s) (50 mL/Hr) IV Continuous <Continuous>  dextrose 5%. 1000 milliLiter(s) (100 mL/Hr) IV Continuous <Continuous>  dextrose 50% Injectable 25 Gram(s) IV Push once  dextrose 50% Injectable 12.5 Gram(s) IV Push once  dextrose 50% Injectable 25 Gram(s) IV Push once  dextrose 50% Injectable 25 Gram(s) IV Push once  droxidopa 600 milliGRAM(s) Oral every 8 hours  epoetin odalis (EPOGEN) Injectable 02344 Unit(s) IV Push <User Schedule>  ferrous    sulfate Liquid 300 milliGRAM(s) Oral daily  glucagon  Injectable 1 milliGRAM(s) IntraMuscular once  insulin lispro (ADMELOG) corrective regimen sliding scale   SubCutaneous every 6 hours  insulin NPH human recombinant 6 Unit(s) SubCutaneous every 6 hours  levETIRAcetam  Solution 1000 milliGRAM(s) Oral daily  levETIRAcetam  Solution 250 milliGRAM(s) Oral <User Schedule>  midodrine. 40 milliGRAM(s) Oral <User Schedule>  norepinephrine Infusion 0.05 MICROgram(s)/kG/Min (6.23 mL/Hr) IV Continuous <Continuous>  pantoprazole  Injectable 40 milliGRAM(s) IV Push two times a day  petrolatum Ophthalmic Ointment 1 Application(s) Both EYES four times a day  sodium chloride 1 Gram(s) Oral two times a day      PHYSICAL EXAM:  T(C): 36.9 (11-05-23 @ 08:00), Max: 37.3 (11-05-23 @ 04:00)  HR: 114 (11-05-23 @ 08:00) (111 - 121)  BP: --  RR: 24 (11-05-23 @ 08:00) (16 - 27)  SpO2: 100% (11-05-23 @ 08:00) (94% - 100%)  Wt(kg): --  I&O's Summary    04 Nov 2023 08:01  -  05 Nov 2023 07:00  --------------------------------------------------------  IN: 1640 mL / OUT: 150 mL / NET: 1490 mL    05 Nov 2023 07:01  -  05 Nov 2023 09:20  --------------------------------------------------------  IN: 40 mL / OUT: 0 mL / NET: 40 mL            Appearance: NAD, +trach  HEENT: dry oral mucosa  Lymphatic: No lymphadenopathy  Cardiovascular: Normal S1 S2, No JVD, No murmurs, No edema  Respiratory: Decreased BS, + trach to vent	  Neuro: opens eyes  Gastrointestinal: Soft, Non-tender, + BS, + NGT  Skin: No rashes, No ecchymoses, No cyanosis	  Extremities: No strength/ROM 2/2 sedation, + BL LE edema  Vascular: Peripheral pulses palpable 2+ bilaterally  Anasarca   Mode: AC/ CMV (Assist Control/ Continuous Mandatory Ventilation), RR (machine): 24, FiO2: 40, PEEP: 8, ITime: 0.8, MAP: 20, PC: 35, PIP: 43              LABS:    CARDIAC MARKERS:                                9.1    13.19 )-----------( 473      ( 05 Nov 2023 01:04 )             30.3     11-05    136  |  101  |  29<H>  ----------------------------<  178<H>  5.0   |  25  |  1.24    Ca    8.8      05 Nov 2023 01:04  Phos  5.2     11-05  Mg     2.40     11-05    TPro  6.6  /  Alb  2.2<L>  /  TBili  0.2  /  DBili  x   /  AST  21  /  ALT  11  /  AlkPhos  268<H>  11-05    proBNP:   Lipid Profile:   HgA1c:   TSH:             TELEMETRY: 	  SR ST  ECG:  	  RADIOLOGY:   DIAGNOSTIC TESTING:  [ ] Echocardiogram:  [ ]  Catheterization:  [ ] Stress Test:    OTHER:

## 2023-11-05 NOTE — PROGRESS NOTE ADULT - ASSESSMENT
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       NEUROLOGY  #AMS  Multiple etiologies including active infection vs CVA.   MR head performed w/ new infarct and old infarcts, EEG without seizure events but with high foci potential   - f/u spot EEG given facial twitching: negative for epileptiform activity (10/31)  - deferring repeat MRI as unlikely to   - continue lipitor  - continue keppra given above  - aspirin held 10/11, likely in s/o GIB, Hgb stable not requiring pRBC since 10/14      CARDIOVASCULAR  # Septic vs vasoplegic shock   Etiology likely septic iso active infection. Possible component of vasoplegic, however no longer with active infection or on sedation. Off IV vasopressors.   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   - resolved    RESPIRATORY  # AHRF second to volume overload   - CKD vs HFpEF w/ hypercarbia 2/2 volume overload requiring intubation, trach, and HD initiation  - Continue on albuterol and chest PT  - Continue volume removal with HD      #tracheomalacia   - CT CHEST (9/8) with tracheomalacia, BL pleural effusions and continued consolidations     GI  # UGIB: last pRBC transfused 11/4 (10/14 before that)   - Continue on PPI BID    # Dysphagia   - NGT-TF     RENAL  # ESRD  - HD MWF TIW with midodrine and droxidopa prior  - ICU for vasopressor assisted volume removal, cap to 1 pressor and not offering CRRT due to comorbidities and prognosis   - f/u w/ nephrology: will continue to offer patient 1-pressor assisted HD as agreed prior, as long as she can tolerate HD maxed on 1 pressor, she will be considered iHD candidate  - UF -2.5L attempted 10/30, reached 1.7L limited by hypotension and tachycardia to 130s, able to remove 2L 11/1, 2L 11/3    INFECTIOUS DISEASE  #Sepsis: temp 101.2 on 11/2; sources include skin (poorly demarcated area of induration/erythema, enlarging on RLQ), pulm (copious sputum)  - Bcx, sputum cx sent  - repeat MRSA PCR neg  - ID re-consulted: abd edema likely 2/2 chronic panniculitis  - Abx:      --> Vanc (11/2) - MRSA neg, dc'd     --> Aztreo 2g     # 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 Coli sputum Cx, BAL w/ MSSA  - treated with abx   - eosinophilia workup: JORGE, ANCAs negative    # MAC   - outpatient treatment    HEME  # Anemia second to GIB vs renal disease   - multiple transfusions, last done 11/4  - management as per renal, PPI    #Allergic transfusion reaction: per RCU documentation, developed erythematous rash across chest shortly after starting transfusion, blood bank consulted and diagnosed mild allergic rxn  - premedicate w/ benadryl and famotidine prior to future transfusions    VASCULAR   # LLE POP DVT   - on Eliquis    # HD access  - TRISHA TAYLOR (9/27 - 10/21)  - TRISHA taylor replaced for HD     ONC   # Hx of Breast CA   - Patient dx in 2018 and s/p BL mastectomy (radical on right) and chemo and RT.     ENDOCRINE  # IDDM2   - Continued on NPH with ISS, however noted with hypoglycemic episodes and NPH dc'ed.    - Finger sticks trending up off NPH.   - Continue on NPH 6U with moderate ISS.   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       NEUROLOGY  #AMS  Multiple etiologies including active infection vs CVA.   MR head performed w/ new infarct and old infarcts, EEG without seizure events but with high foci potential   - f/u spot EEG given facial twitching: negative for epileptiform activity (10/31)  - deferring repeat MRI as unlikely to   - continue lipitor  - continue keppra given above  - aspirin held 10/11, likely in s/o GIB, Hgb stable not requiring pRBC since 10/14      CARDIOVASCULAR  # Septic vs vasoplegic shock   Etiology likely septic iso active infection. Possible component of vasoplegic, however no longer with active infection or on sedation. Off IV vasopressors.   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   - resolved    RESPIRATORY  # AHRF second to volume overload   - CKD vs HFpEF w/ hypercarbia 2/2 volume overload requiring intubation, trach, and HD initiation  - Continue on albuterol and chest PT  - Continue volume removal with HD      #tracheomalacia   - CT CHEST (9/8) with tracheomalacia, BL pleural effusions and continued consolidations     GI  # UGIB: last pRBC transfused 11/4 (10/14 before that)   - Continue on PPI BID    # Dysphagia   - NGT-TF     RENAL  # ESRD  - HD MWF TIW with midodrine and droxidopa prior  - ICU for vasopressor assisted volume removal, cap to 1 pressor and not offering CRRT due to comorbidities and prognosis   - f/u w/ nephrology: will continue to offer patient 1-pressor assisted HD as agreed prior, as long as she can tolerate HD maxed on 1 pressor, she will be considered iHD candidate  - UF -2.5L attempted 10/30, reached 1.7L limited by hypotension and tachycardia to 130s, able to remove 2L 11/1, 2L 11/3    INFECTIOUS DISEASE  #Sepsis: temp 101.2 on 11/2; sources include skin (poorly demarcated area of induration/erythema, enlarging on RLQ), pulm (copious sputum)  - Bcx, sputum cx sent  - repeat MRSA PCR neg  - ID re-consulted: abd edema likely 2/2 chronic panniculitis  - Abx:      --> Vanc (11/2) - MRSA neg, dc'd     --> Aztreo 2g     # 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 Coli sputum Cx, BAL w/ MSSA  - treated with abx   - eosinophilia workup: JORGE, ANCAs negative    # MAC   - outpatient treatment    HEME  # Anemia second to GIB vs renal disease   - multiple transfusions, last done 11/4  - management as per renal, PPI    #Allergic transfusion reaction: per RCU documentation, developed erythematous rash across chest shortly after starting transfusion, blood bank consulted and diagnosed mild allergic rxn  - premedicate w/ benadryl and famotidine prior to future transfusions    VASCULAR   # LLE POP DVT   - on Eliquis    # HD access  - TRISHA TAYLOR (9/27 - 10/21)  - TRISHA taylor replaced for HD     ONC   # Hx of Breast CA   - Patient dx in 2018 and s/p BL mastectomy (radical on right) and chemo and RT.     ENDOCRINE  # IDDM2   - Continued on NPH with ISS, however noted with hypoglycemic episodes and NPH dc'ed.    - Finger sticks trending up off NPH.   - Continue on NPH 6U with moderate ISS.   - Adjust as need     SKIN  # Drug Eruption   - treated    ETHICS/ GOC    - Attempted palliative discussion however family not interested and wishes for FULL CODE  - Family continues to want everything done despite inability to tolerate HD on multiple oral pressor  - Levo capped at 0.3 during HD

## 2023-11-05 NOTE — PROGRESS NOTE ADULT - ASSESSMENT

## 2023-11-06 NOTE — PROGRESS NOTE ADULT - SUBJECTIVE AND OBJECTIVE BOX
S:  Pt seen and examined. On pressors for HD    Medications: MEDICATIONS  (STANDING):  albuterol/ipratropium for Nebulization 3 milliLiter(s) Nebulizer every 6 hours  apixaban 5 milliGRAM(s) Oral every 12 hours  artificial tears (preservative free) Ophthalmic Solution 1 Drop(s) Both EYES every 4 hours  atorvastatin 10 milliGRAM(s) Oral at bedtime  aztreonam  IVPB 2000 milliGRAM(s) IV Intermittent <User Schedule>  chlorhexidine 0.12% Liquid 15 milliLiter(s) Oral Mucosa every 12 hours  chlorhexidine 2% Cloths 1 Application(s) Topical daily  ciprofloxacin/hydrocortisone Suspension Otic 4 Drop(s) Left Ear two times a day  dextrose 5%. 1000 milliLiter(s) (50 mL/Hr) IV Continuous <Continuous>  dextrose 5%. 1000 milliLiter(s) (100 mL/Hr) IV Continuous <Continuous>  dextrose 50% Injectable 25 Gram(s) IV Push once  dextrose 50% Injectable 12.5 Gram(s) IV Push once  dextrose 50% Injectable 25 Gram(s) IV Push once  dextrose 50% Injectable 25 Gram(s) IV Push once  droxidopa 600 milliGRAM(s) Oral every 8 hours  epoetin odalis (EPOGEN) Injectable 28889 Unit(s) IV Push <User Schedule>  ferrous    sulfate Liquid 300 milliGRAM(s) Oral daily  glucagon  Injectable 1 milliGRAM(s) IntraMuscular once  insulin lispro (ADMELOG) corrective regimen sliding scale   SubCutaneous every 6 hours  insulin NPH human recombinant 6 Unit(s) SubCutaneous every 6 hours  levETIRAcetam  Solution 1000 milliGRAM(s) Oral daily  levETIRAcetam  Solution 250 milliGRAM(s) Oral <User Schedule>  midodrine. 40 milliGRAM(s) Oral <User Schedule>  norepinephrine Infusion 0.05 MICROgram(s)/kG/Min (6.23 mL/Hr) IV Continuous <Continuous>  pantoprazole  Injectable 40 milliGRAM(s) IV Push two times a day  petrolatum Ophthalmic Ointment 1 Application(s) Both EYES four times a day  sodium chloride 1 Gram(s) Oral two times a day  sodium chloride 3%  Inhalation 4 milliLiter(s) Inhalation every 6 hours    MEDICATIONS  (PRN):  acetaminophen   Oral Liquid .. 650 milliGRAM(s) Oral every 6 hours PRN Temp greater or equal to 38C (100.4F), Mild Pain (1 - 3)  dextrose Oral Gel 15 Gram(s) Oral once PRN Blood Glucose LESS THAN 70 milliGRAM(s)/deciliter  diphenhydrAMINE Elixir 50 milliGRAM(s) Oral once PRN Rash and/or Itching  droxidopa 200 milliGRAM(s) Oral <User Schedule> PRN Prior to hemodialysis  midodrine. 20 milliGRAM(s) Oral <User Schedule> PRN 1 Hour Pre HD  sodium chloride 0.9% Bolus. 250 milliLiter(s) IV Bolus every 5 minutes PRN SBP LESS THAN or EQUAL to 90 mmHg  sodium chloride 0.9% lock flush 10 milliLiter(s) IV Push every 1 hour PRN Pre/post blood products, medications, blood draw, and to maintain line patency       Vitals:  Vital Signs Last 24 Hrs  T(C): 36.6 (06 Nov 2023 08:00), Max: 37.3 (05 Nov 2023 20:00)  T(F): 97.9 (06 Nov 2023 08:00), Max: 99.2 (05 Nov 2023 20:00)  HR: 136 (06 Nov 2023 09:30) (110 - 136)  BP: --  BP(mean): --  RR: 22 (06 Nov 2023 09:30) (13 - 31)  SpO2: 100% (06 Nov 2023 09:30) (90% - 100%)    Parameters below as of 06 Nov 2023 08:39  Patient On (Oxygen Delivery Method): ventilator                LABS:                          9.0    13.69 )-----------( 451      ( 06 Nov 2023 01:10 )             29.5     11-06    132<L>  |  100  |  39<H>  ----------------------------<  175<H>  5.2   |  25  |  1.46<H>    Ca    8.6      06 Nov 2023 01:10  Phos  6.0     11-06  Mg     2.60     11-06    TPro  6.5  /  Alb  2.0<L>  /  TBili  0.2  /  DBili  x   /  AST  26  /  ALT  12  /  AlkPhos  315<H>  11-06    LIVER FUNCTIONS - ( 06 Nov 2023 01:10 )  Alb: 2.0 g/dL / Pro: 6.5 g/dL / ALK PHOS: 315 U/L / ALT: 12 U/L / AST: 26 U/L / GGT: x           PTT - ( 05 Nov 2023 01:04 )  PTT:34.6 sec  Urinalysis Basic - ( 06 Nov 2023 01:10 )    Color: x / Appearance: x / SG: x / pH: x  Gluc: 175 mg/dL / Ketone: x  / Bili: x / Urobili: x   Blood: x / Protein: x / Nitrite: x   Leuk Esterase: x / RBC: x / WBC x   Sq Epi: x / Non Sq Epi: x / Bacteria: x        Neurological Exam:  Mental Status: Eyes closed, minimal spont eye opening off sedation. Does not follow commands,  + trach to vent and NGT   Cranial Nerves: PERRL slightly sluggish, R facial twitching noted by mouth lessened today- ? suppressible. occasional head dystonia  Motor: Moves upper extremities spontaneously R >L, tremor R > L  no movement noted in lowers  Sensation: WD to noxious x4    I personally reviewed the below data/images/labs:        < from: CT Head No Cont (08.15.23 @ 17:20) >    ACC: [id number] EXAM:  CT BRAIN   ORDERED BY: MINAL SAM     PROCEDURE DATE:  08/15/2023          INTERPRETATION:  Clinical indication: Change in neuro exam.    Multiple axial sections were performed from base to vertex without   contrast enhancement. Coronal and sagittal reconstructions were   reformatted well.    This exam is compared prior head CT performed on August 11, 2023    Parenchymal volume loss and chronic microvessel ischemic changes are   again seen.    Abnormal low-attenuation involving the left occipital cortical   subcortical region is again seen. 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    No evidence of acute hemorrhage mass or mass effect is seen.    Evaluation of the osseous structures with appropriate window demonstrates   sclerotic changes about the left mastoid region which appears stable.   Opacification left middle ear region is again seen.    Patient is status post bilateral cataract surgery.    Impression: Stable exam.    < end of copied text >    vEEG:    Clinical Impression:  - Severe diffuse non-specific cerebral dysfunction  - There were no epileptiform abnormalities or seizures recorded.        CTH 8/11:    VENTRICLES AND SULCI: Age-appropriate involutional change  INTRA-AXIAL:  Old left PCA infarct as seen on the prior unchanged.   Microvascular ischemic changes involving the periventricular and   subcortical white matter as seen previously  EXTRA-AXIAL:  No mass or collection is seen.  VISUALIZED SINUSES:  Clear.  VISUALIZED MASTOIDS: Left mastoid sclerosis  CALVARIUM: Infiltrative appearance tothe calvarium may be indicative of   marrow infiltration on the basis of patient's known diagnosis of breast   cancer. MISCELLANEOUS:  None.    IMPRESSION:  No significant interval change compared with 7/17/2023 in   left PCA infarct which is old. Microvascular ischemic changes involving   the periventricular and subcortical white matter as seen   previously.Questionable lesions at the level of the calvarium related to   possible breast CA. Clinical correlation recommended.    --- End of Report ---      ct< from: CT Head No Cont (09.26.23 @ 21:02) >  IMPRESSION: Vague questionable areas of hypoattenuation within the   bilateral cerebellar hemispheres which may be compatible with   acute/subacute ischemia. Recommend further evaluation with a brain MRI   study, provided there are no MRI contraindications.    No acute intracranial hemorrhage.    Previously seen questionable vague wedge-shaped area of hypoattenuation   in the left parietal lobe with artifactual secondary to motion and volume   averaging with a prominent sulcus.    Chronic left occipital lobe infarct.    < end of copied text >    < from: CT Head No Cont (10.03.23 @ 20:46) >    IMPRESSION:    No acute intracranial hemorrhage or mass effect. Evaluation of the   posterior fossa degraded by streak artifact from dental amalgam.   Lucencies in the bilateral cerebellum may be artifactual.    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    Bilateral middle ear and mastoid effusions which are also seen on prior   exam.    EEG Classification / Summary:  Abnormal EEG in the awake, drowsy states.   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   -No seizures

## 2023-11-06 NOTE — PROGRESS NOTE ADULT - ATTENDING COMMENTS
Patient examined and case reviewed in detail on bedside rounds  Critically ill and unstable on vent/pressors/HD with severe hypotension during HD that requires high dose norepi  Frequent bedside visits with therapy change today.   I have personally and independently provided 35+ minutes of critical care services; this excludes time spent on separate procedures or teaching

## 2023-11-06 NOTE — CHART NOTE - NSCHARTNOTEFT_GEN_A_CORE
: Angy Jaquez MD. Ancelmo Silverio MD. Karan Steiner MD.     INDICATION: critical illness    PROCEDURE:  [ X ] LIMITED ECHO  [ X ] LIMITED CHEST  [ ] LIMITED RETROPERITONEAL  [ ] LIMITED ABDOMINAL  [ ] LIMITED DVT  [ ] NEEDLE GUIDANCE VASCULAR  [ ] NEEDLE GUIDANCE THORACENTESIS  [ ] NEEDLE GUIDANCE PARACENTESIS  [ ] NEEDLE GUIDANCE PERICARDIOCENTESIS  [ ] OTHER    FINDINGS: A-line predominant. Decreased LV function. No evidence of RV strain/enlargement.      INTERPRETATION: Grossly abnormal cardiac function, decreased LV function. : Angy Jaquez MD. Ancelmo Silverio MD. Karan Steiner MD.     INDICATION: critical illness    PROCEDURE:  [ X ] LIMITED ECHO  [ X ] LIMITED CHEST  [ ] LIMITED RETROPERITONEAL  [ ] LIMITED ABDOMINAL  [ ] LIMITED DVT  [ ] NEEDLE GUIDANCE VASCULAR  [ ] NEEDLE GUIDANCE THORACENTESIS  [ ] NEEDLE GUIDANCE PARACENTESIS  [ ] NEEDLE GUIDANCE PERICARDIOCENTESIS  [ ] OTHER    FINDINGS: A-line predominant. Decreased LV function. No evidence of RV strain/enlargement.      INTERPRETATION: Grossly abnormal cardiac function, decreased LV function.      I was present during the key portions of the procedure and immediately available during the entire procedure.  Jatin TITUS  Attending

## 2023-11-06 NOTE — PROGRESS NOTE ADULT - SUBJECTIVE AND OBJECTIVE BOX
Patient is a 73y old  Female who presents with a chief complaint of Respiratory distress (06 Nov 2023 13:16)      SUBJECTIVE / OVERNIGHT EVENTS: completed O. externa treatment, On pressor assisted HD, off ABx, trache to vent,     MEDICATIONS  (STANDING):  albuterol/ipratropium for Nebulization 3 milliLiter(s) Nebulizer every 6 hours  apixaban 5 milliGRAM(s) Oral every 12 hours  artificial tears (preservative free) Ophthalmic Solution 1 Drop(s) Both EYES every 4 hours  atorvastatin 10 milliGRAM(s) Oral at bedtime  chlorhexidine 0.12% Liquid 15 milliLiter(s) Oral Mucosa every 12 hours  chlorhexidine 2% Cloths 1 Application(s) Topical daily  dextrose 5%. 1000 milliLiter(s) (50 mL/Hr) IV Continuous <Continuous>  dextrose 5%. 1000 milliLiter(s) (100 mL/Hr) IV Continuous <Continuous>  dextrose 50% Injectable 25 Gram(s) IV Push once  dextrose 50% Injectable 12.5 Gram(s) IV Push once  dextrose 50% Injectable 25 Gram(s) IV Push once  dextrose 50% Injectable 25 Gram(s) IV Push once  droxidopa 600 milliGRAM(s) Oral every 8 hours  epoetin odalis (EPOGEN) Injectable 41557 Unit(s) IV Push <User Schedule>  ferrous    sulfate Liquid 300 milliGRAM(s) Oral daily  glucagon  Injectable 1 milliGRAM(s) IntraMuscular once  insulin lispro (ADMELOG) corrective regimen sliding scale   SubCutaneous every 6 hours  insulin NPH human recombinant 6 Unit(s) SubCutaneous every 6 hours  levETIRAcetam  Solution 1000 milliGRAM(s) Oral daily  levETIRAcetam  Solution 250 milliGRAM(s) Oral <User Schedule>  midodrine. 40 milliGRAM(s) Oral <User Schedule>  norepinephrine Infusion 0.05 MICROgram(s)/kG/Min (6.23 mL/Hr) IV Continuous <Continuous>  pantoprazole  Injectable 40 milliGRAM(s) IV Push two times a day  petrolatum Ophthalmic Ointment 1 Application(s) Both EYES four times a day  sodium chloride 1 Gram(s) Oral two times a day  sodium chloride 3%  Inhalation 4 milliLiter(s) Inhalation every 6 hours    MEDICATIONS  (PRN):  acetaminophen   Oral Liquid .. 650 milliGRAM(s) Oral every 6 hours PRN Temp greater or equal to 38C (100.4F), Mild Pain (1 - 3)  dextrose Oral Gel 15 Gram(s) Oral once PRN Blood Glucose LESS THAN 70 milliGRAM(s)/deciliter  diphenhydrAMINE Elixir 50 milliGRAM(s) Oral once PRN Rash and/or Itching  droxidopa 200 milliGRAM(s) Oral <User Schedule> PRN Prior to hemodialysis  midodrine. 20 milliGRAM(s) Oral <User Schedule> PRN 1 Hour Pre HD  sodium chloride 0.9% Bolus. 250 milliLiter(s) IV Bolus every 5 minutes PRN SBP LESS THAN or EQUAL to 90 mmHg  sodium chloride 0.9% lock flush 10 milliLiter(s) IV Push every 1 hour PRN Pre/post blood products, medications, blood draw, and to maintain line patency      Vital Signs Last 24 Hrs  T(F): 98.8 (11-06-23 @ 16:00), Max: 99.2 (11-05-23 @ 20:00)  HR: 116 (11-06-23 @ 19:00) (110 - 136)  BP: --  RR: 24 (11-06-23 @ 19:00) (11 - 31)  SpO2: 100% (11-06-23 @ 19:00) (90% - 100%)  Telemetry:   CAPILLARY BLOOD GLUCOSE      POCT Blood Glucose.: 177 mg/dL (06 Nov 2023 17:11)  POCT Blood Glucose.: 134 mg/dL (06 Nov 2023 12:29)  POCT Blood Glucose.: 144 mg/dL (06 Nov 2023 05:35)  POCT Blood Glucose.: 178 mg/dL (06 Nov 2023 00:27)    I&O's Summary    05 Nov 2023 07:01  -  06 Nov 2023 07:00  --------------------------------------------------------  IN: 1360 mL / OUT: 300 mL / NET: 1060 mL    06 Nov 2023 07:01  -  06 Nov 2023 19:48  --------------------------------------------------------  IN: 1149.7 mL / OUT: 2800 mL / NET: -1650.3 mL        PHYSICAL EXAM:  GENERAL: NAD, well-developed  HEAD:  Atraumatic, Normocephalic  EYES: EOMI, PERRLA, conjunctiva and sclera clear  NECK: Supple, No JVD  CHEST/LUNG: Clear to auscultation bilaterally; No wheeze  HEART: Regular rate and rhythm; No murmurs, rubs, or gallops  ABDOMEN: Soft, Nontender, Nondistended; Bowel sounds present  EXTREMITIES:  2+ Peripheral Pulses, No clubbing, cyanosis, or edema  PSYCH: AAOx3  NEUROLOGY: non-focal  SKIN: No rashes or lesions    LABS:                        9.0    13.69 )-----------( 451      ( 06 Nov 2023 01:10 )             29.5     11-06    132<L>  |  100  |  39<H>  ----------------------------<  175<H>  5.2   |  25  |  1.46<H>    Ca    8.6      06 Nov 2023 01:10  Phos  6.0     11-06  Mg     2.60     11-06    TPro  6.5  /  Alb  2.0<L>  /  TBili  0.2  /  DBili  x   /  AST  26  /  ALT  12  /  AlkPhos  315<H>  11-06    PTT - ( 05 Nov 2023 01:04 )  PTT:34.6 sec      Urinalysis Basic - ( 06 Nov 2023 01:10 )    Color: x / Appearance: x / SG: x / pH: x  Gluc: 175 mg/dL / Ketone: x  / Bili: x / Urobili: x   Blood: x / Protein: x / Nitrite: x   Leuk Esterase: x / RBC: x / WBC x   Sq Epi: x / Non Sq Epi: x / Bacteria: x        RADIOLOGY & ADDITIONAL TESTS:    Imaging Personally Reviewed:    Consultant(s) Notes Reviewed:      Care Discussed with Consultants/Other Providers:

## 2023-11-06 NOTE — PROGRESS NOTE ADULT - ASSESSMENT

## 2023-11-06 NOTE — PROGRESS NOTE ADULT - ASSESSMENT
72 y/o F well [facility] to me [facility]. she was admitted at [facility] 7/12-7/22 w aspiration PNA, was treated w CEFEPIME, developed an allergic rash,  dCHF, + MAC on AFB culture, had been progressively getting more and more lethargic and dyspneic at home since DC.   In  am of 8/11/23  ptn presented with respiratory distress w hypoxia and hypercarbia requiring intubation 2/2 volume overload +/- Asp PNA      Neuro   not responsive  Baseline MS AOx3, aphasic   - h/o CVA , on aspirin and statin . resumed w feeding tube, ASA resumed 8/26  - eeg  2/2 tremors, no sz focus, right facial twitching, on KEPPRA  - less responsive in the past few days  - MRI 8/17:  new R cerebellar infarct, old left PCA/Occipital infarct. probably embolic in nature, did not tolerate full AC in the past, STEPHANIE is neg , no shunts observed  - ptn is poorly reactive, rpt scan done, no further CVA seen.     Cardiac   cardiology following  CHFpEF   TTE 7/2023 with EF 59%, with severe LVH and diastolic dysfunction   off Levo drip , now only during HD  on midodrine 40 mg qid, droxidopa 600 tid, additional 200 mg prior to HD,     Pulmonary   Acute hypercapnic and hypoxemic respiratory failure   prolonged intubation, now  trache to  vent, has copious secretions      Renal   on HD via L IJ Shiley, tunneled catheter when clinically stable  HD MWF, midodrine and pressor assisted, PUF in between HD days,  permacath when clinically stable      GI  NPO  on tube feeds  UGI bleed 8/31,  EGD/Colonoscopy: erosive gastropathy, esophagisits, on colonoscopy old blood seen no active bleeding, poor prep  NGT-TF  s/p 2UPRBC 11/4    Endocrine  on Insulin: NPH, Admelog    ID   complicated infectious hospital course  treated for MSSA bacteremia, aspiration PNA.  sputum + for pseudomonas, ptn was initially on zosyn but was allergic, then was switched to aztreonam   Aztreonam was switched to polymyxin for a possible allergy but ptn didnt have a reaction to aztreonam, she had a reaction to Zosyn.   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 externa resolved    Heme/Onc  on eliquis for  LEFT POPLITEAL VEIN ACUTE DVT , on ELiquis    Ethics  GOC - Discussed GOC with daughter and , they have opted in the past for full code. and she remains full code at present

## 2023-11-06 NOTE — PROGRESS NOTE ADULT - PROBLEM SELECTOR PLAN 1
- Please stop Ciprodex as the otitis externa is improved  - Please stop bacitracin and change to Aquaphor or Vaseline to the left sakina bowl and auricle  - Please make sure no water goes into the left ear canal and no pressure sits on the left ear  - No further ENT intervention needed at this time  - ENT sign off  - Case discussed with Dr. Feng

## 2023-11-06 NOTE — PROGRESS NOTE ADULT - ASSESSMENT
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       NEUROLOGY  #AMS  Multiple etiologies including active infection vs CVA.   MR head performed w/ new infarct and old infarcts, EEG without seizure events but with high foci potential   - f/u spot EEG given facial twitching: negative for epileptiform activity (10/31)  - deferring repeat MRI as unlikely to   - continue lipitor  - continue keppra given above  - aspirin held 10/11, likely in s/o GIB, Hgb stable not requiring pRBC since 10/14      CARDIOVASCULAR  # Septic vs vasoplegic shock   Etiology likely septic iso active infection. Possible component of vasoplegic, however no longer with active infection or on sedation. Off IV vasopressors.   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   - resolved    RESPIRATORY  # AHRF second to volume overload   - CKD vs HFpEF w/ hypercarbia 2/2 volume overload requiring intubation, trach, and HD initiation  - Continue on albuterol and chest PT  - Continue volume removal with HD      #tracheomalacia   - CT CHEST (9/8) with tracheomalacia, BL pleural effusions and continued consolidations     GI  # UGIB: last pRBC transfused 11/4 (10/14 before that)   - Continue on PPI BID    # Dysphagia   - NGT-TF     RENAL  # ESRD  - HD MWF TIW with midodrine and droxidopa prior  - ICU for vasopressor assisted volume removal, cap to 1 pressor and not offering CRRT due to comorbidities and prognosis   - f/u w/ nephrology: will continue to offer patient 1-pressor assisted HD as agreed prior, as long as she can tolerate HD maxed on 1 pressor, she will be considered iHD candidate  - UF -2.5L attempted 10/30, reached 1.7L limited by hypotension and tachycardia to 130s, able to remove 2L 11/1, 2L 11/3    INFECTIOUS DISEASE  #Sepsis: temp 101.2 on 11/2; sources include skin (poorly demarcated area of induration/erythema, enlarging on RLQ), pulm (copious sputum)  - Bcx, sputum cx sent  - repeat MRSA PCR neg  - ID re-consulted: abd edema likely 2/2 chronic panniculitis  - Abx:      --> Vanc (11/2) - MRSA neg, dc'd     --> Aztreo 2g     # 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 Coli sputum Cx, BAL w/ MSSA  - treated with abx   - eosinophilia workup: JORGE, ANCAs negative    # MAC   - outpatient treatment    HEME  # Anemia second to GIB vs renal disease   - multiple transfusions, last done 11/4  - management as per renal, PPI    #Allergic transfusion reaction: per RCU documentation, developed erythematous rash across chest shortly after starting transfusion, blood bank consulted and diagnosed mild allergic rxn  - premedicate w/ benadryl and famotidine prior to future transfusions    VASCULAR   # LLE POP DVT   - on Eliquis    # HD access  - TRISHA TAYLOR (9/27 - 10/21)  - TRISHA taylor replaced for HD     ONC   # Hx of Breast CA   - Patient dx in 2018 and s/p BL mastectomy (radical on right) and chemo and RT.     ENDOCRINE  # IDDM2   - Continued on NPH with ISS, however noted with hypoglycemic episodes and NPH dc'ed.    - Finger sticks trending up off NPH.   - Continue on NPH 6U with moderate ISS.   - Adjust as need     SKIN  # Drug Eruption   - treated    ETHICS/ GOC    - Attempted palliative discussion however family not interested and wishes for FULL CODE  - Family continues to want everything done despite inability to tolerate HD on multiple oral pressor  - Levo capped at 0.3 during HD

## 2023-11-06 NOTE — PROGRESS NOTE ADULT - SUBJECTIVE AND OBJECTIVE BOX
MICU Progress Note    INTERVAL HPI/OVERNIGHT EVENTS:    SUBJECTIVE: Patient seen and examined at bedside.     CONSTITUTIONAL: No weakness, fevers or chills  EYES/ENT: No visual changes;  No vertigo or throat pain   NECK: No pain or stiffness  RESPIRATORY: No cough, wheezing, hemoptysis; No shortness of breath  CARDIOVASCULAR: No chest pain or palpitations  GASTROINTESTINAL: No abdominal or epigastric pain. No nausea, vomiting, or hematemesis; No diarrhea or constipation. No melena or hematochezia.  GENITOURINARY: No dysuria, frequency or hematuria  NEUROLOGICAL: No numbness or weakness  SKIN: No itching, rashes    OBJECTIVE:    VITAL SIGNS:  ICU Vital Signs Last 24 Hrs  T(C): 37.2 (06 Nov 2023 04:00), Max: 37.3 (05 Nov 2023 20:00)  T(F): 98.9 (06 Nov 2023 04:00), Max: 99.2 (05 Nov 2023 20:00)  HR: 113 (06 Nov 2023 06:00) (111 - 123)  BP: --  BP(mean): --  ABP: 112/42 (06 Nov 2023 06:00) (112/42 - 142/60)  ABP(mean): 71 (06 Nov 2023 06:00) (71 - 95)  RR: 24 (06 Nov 2023 06:00) (21 - 31)  SpO2: 98% (06 Nov 2023 06:00) (90% - 100%)    O2 Parameters below as of 06 Nov 2023 06:00  Patient On (Oxygen Delivery Method): ventilator    O2 Concentration (%): 40      Mode: AC/ CMV (Assist Control/ Continuous Mandatory Ventilation), RR (machine): 24, FiO2: 40, PEEP: 8, ITime: 0.8, MAP: 19, PC: 35, PIP: 43    11-04 @ 08:01  -  11-05 @ 07:00  --------------------------------------------------------  IN: 1640 mL / OUT: 150 mL / NET: 1490 mL    11-05 @ 07:01  -  11-06 @ 06:44  --------------------------------------------------------  IN: 1320 mL / OUT: 300 mL / NET: 1020 mL      CAPILLARY BLOOD GLUCOSE      POCT Blood Glucose.: 144 mg/dL (06 Nov 2023 05:35)      PHYSICAL EXAM:  General: NAD  HEENT: NC/AT; PERRL, clear conjunctiva; L ear w/ grainy white residue, no bleeding appreciated  Neck: supple  Respiratory: CTA b/l  Cardiovascular: +S1/S2; RRR  Abdomen: soft, distended, warm to touch w/ area of erythema and induration RLQ  Extremities: WWP, 2+ peripheral pulses b/l; diffuse anasarca  Skin: normal color and turgor; no rash  Neurological: AO*0; rhythmic R facial and arm twitching      MEDICATIONS:  MEDICATIONS  (STANDING):  albuterol/ipratropium for Nebulization 3 milliLiter(s) Nebulizer every 6 hours  apixaban 5 milliGRAM(s) Oral every 12 hours  artificial tears (preservative free) Ophthalmic Solution 1 Drop(s) Both EYES every 4 hours  atorvastatin 10 milliGRAM(s) Oral at bedtime  aztreonam  IVPB 2000 milliGRAM(s) IV Intermittent <User Schedule>  chlorhexidine 0.12% Liquid 15 milliLiter(s) Oral Mucosa every 12 hours  chlorhexidine 2% Cloths 1 Application(s) Topical daily  ciprofloxacin/hydrocortisone Suspension Otic 4 Drop(s) Left Ear two times a day  dextrose 5%. 1000 milliLiter(s) (100 mL/Hr) IV Continuous <Continuous>  dextrose 5%. 1000 milliLiter(s) (50 mL/Hr) IV Continuous <Continuous>  dextrose 50% Injectable 25 Gram(s) IV Push once  dextrose 50% Injectable 12.5 Gram(s) IV Push once  dextrose 50% Injectable 25 Gram(s) IV Push once  dextrose 50% Injectable 25 Gram(s) IV Push once  droxidopa 600 milliGRAM(s) Oral every 8 hours  epoetin odalis (EPOGEN) Injectable 79867 Unit(s) IV Push <User Schedule>  ferrous    sulfate Liquid 300 milliGRAM(s) Oral daily  glucagon  Injectable 1 milliGRAM(s) IntraMuscular once  insulin lispro (ADMELOG) corrective regimen sliding scale   SubCutaneous every 6 hours  insulin NPH human recombinant 6 Unit(s) SubCutaneous every 6 hours  levETIRAcetam  Solution 1000 milliGRAM(s) Oral daily  levETIRAcetam  Solution 250 milliGRAM(s) Oral <User Schedule>  midodrine. 40 milliGRAM(s) Oral <User Schedule>  norepinephrine Infusion 0.05 MICROgram(s)/kG/Min (6.23 mL/Hr) IV Continuous <Continuous>  pantoprazole  Injectable 40 milliGRAM(s) IV Push two times a day  petrolatum Ophthalmic Ointment 1 Application(s) Both EYES four times a day  sodium chloride 1 Gram(s) Oral two times a day  sodium chloride 3%  Inhalation 4 milliLiter(s) Inhalation every 6 hours    MEDICATIONS  (PRN):  acetaminophen   Oral Liquid .. 650 milliGRAM(s) Oral every 6 hours PRN Temp greater or equal to 38C (100.4F), Mild Pain (1 - 3)  dextrose Oral Gel 15 Gram(s) Oral once PRN Blood Glucose LESS THAN 70 milliGRAM(s)/deciliter  diphenhydrAMINE Elixir 50 milliGRAM(s) Oral once PRN Rash and/or Itching  droxidopa 200 milliGRAM(s) Oral <User Schedule> PRN Prior to hemodialysis  midodrine. 20 milliGRAM(s) Oral <User Schedule> PRN 1 Hour Pre HD  sodium chloride 0.9% Bolus. 250 milliLiter(s) IV Bolus every 5 minutes PRN SBP LESS THAN or EQUAL to 90 mmHg  sodium chloride 0.9% lock flush 10 milliLiter(s) IV Push every 1 hour PRN Pre/post blood products, medications, blood draw, and to maintain line patency      ALLERGIES:  Allergies    isoniazid (Rash)  nafcillin (Unknown)  hydrALAZINE (Rash)  vitamin E (Short breath; Urticaria; Hives)  doxycycline (Rash)  cefepime (Rash)  NIFEdipine (Urticaria; Hives)  Zosyn (Rash)    Intolerances        LABS:                        9.0    13.69 )-----------( 451      ( 06 Nov 2023 01:10 )             29.5     11-06    132<L>  |  100  |  39<H>  ----------------------------<  175<H>  5.2   |  25  |  1.46<H>    Ca    8.6      06 Nov 2023 01:10  Phos  6.0     11-06  Mg     2.60     11-06    TPro  6.5  /  Alb  2.0<L>  /  TBili  0.2  /  DBili  x   /  AST  26  /  ALT  12  /  AlkPhos  315<H>  11-06    PTT - ( 05 Nov 2023 01:04 )  PTT:34.6 sec  Urinalysis Basic - ( 06 Nov 2023 01:10 )    Color: x / Appearance: x / SG: x / pH: x  Gluc: 175 mg/dL / Ketone: x  / Bili: x / Urobili: x   Blood: x / Protein: x / Nitrite: x   Leuk Esterase: x / RBC: x / WBC x   Sq Epi: x / Non Sq Epi: x / Bacteria: x        RADIOLOGY & ADDITIONAL TESTS: Reviewed.

## 2023-11-06 NOTE — PROGRESS NOTE ADULT - SUBJECTIVE AND OBJECTIVE BOX
ENT ISSUE/POD: left otitis externa    HPI: Patient seen and examined at bedside with daughter, currently on hemodialysis, sedated on ventilator.         PAST MEDICAL & SURGICAL HISTORY:  Breast CA      Diabetes      Stroke      Cardiac arrest      HTN (hypertension)      H/O mastectomy, bilateral        Allergies    isoniazid (Rash)  nafcillin (Unknown)  hydrALAZINE (Rash)  vitamin E (Short breath; Urticaria; Hives)  doxycycline (Rash)  cefepime (Rash)  NIFEdipine (Urticaria; Hives)  Zosyn (Rash)    Intolerances      MEDICATIONS  (STANDING):  albuterol/ipratropium for Nebulization 3 milliLiter(s) Nebulizer every 6 hours  apixaban 5 milliGRAM(s) Oral every 12 hours  artificial tears (preservative free) Ophthalmic Solution 1 Drop(s) Both EYES every 4 hours  atorvastatin 10 milliGRAM(s) Oral at bedtime  aztreonam  IVPB 2000 milliGRAM(s) IV Intermittent <User Schedule>  chlorhexidine 0.12% Liquid 15 milliLiter(s) Oral Mucosa every 12 hours  chlorhexidine 2% Cloths 1 Application(s) Topical daily  ciprofloxacin/hydrocortisone Suspension Otic 4 Drop(s) Left Ear two times a day  dextrose 5%. 1000 milliLiter(s) (50 mL/Hr) IV Continuous <Continuous>  dextrose 5%. 1000 milliLiter(s) (100 mL/Hr) IV Continuous <Continuous>  dextrose 50% Injectable 25 Gram(s) IV Push once  dextrose 50% Injectable 12.5 Gram(s) IV Push once  dextrose 50% Injectable 25 Gram(s) IV Push once  dextrose 50% Injectable 25 Gram(s) IV Push once  droxidopa 600 milliGRAM(s) Oral every 8 hours  epoetin odalis (EPOGEN) Injectable 00309 Unit(s) IV Push <User Schedule>  ferrous    sulfate Liquid 300 milliGRAM(s) Oral daily  glucagon  Injectable 1 milliGRAM(s) IntraMuscular once  insulin lispro (ADMELOG) corrective regimen sliding scale   SubCutaneous every 6 hours  insulin NPH human recombinant 6 Unit(s) SubCutaneous every 6 hours  levETIRAcetam  Solution 1000 milliGRAM(s) Oral daily  levETIRAcetam  Solution 250 milliGRAM(s) Oral <User Schedule>  midodrine. 40 milliGRAM(s) Oral <User Schedule>  norepinephrine Infusion 0.05 MICROgram(s)/kG/Min (6.23 mL/Hr) IV Continuous <Continuous>  pantoprazole  Injectable 40 milliGRAM(s) IV Push two times a day  petrolatum Ophthalmic Ointment 1 Application(s) Both EYES four times a day  sodium chloride 1 Gram(s) Oral two times a day  sodium chloride 3%  Inhalation 4 milliLiter(s) Inhalation every 6 hours    MEDICATIONS  (PRN):  acetaminophen   Oral Liquid .. 650 milliGRAM(s) Oral every 6 hours PRN Temp greater or equal to 38C (100.4F), Mild Pain (1 - 3)  dextrose Oral Gel 15 Gram(s) Oral once PRN Blood Glucose LESS THAN 70 milliGRAM(s)/deciliter  diphenhydrAMINE Elixir 50 milliGRAM(s) Oral once PRN Rash and/or Itching  droxidopa 200 milliGRAM(s) Oral <User Schedule> PRN Prior to hemodialysis  midodrine. 20 milliGRAM(s) Oral <User Schedule> PRN 1 Hour Pre HD  sodium chloride 0.9% Bolus. 250 milliLiter(s) IV Bolus every 5 minutes PRN SBP LESS THAN or EQUAL to 90 mmHg  sodium chloride 0.9% lock flush 10 milliLiter(s) IV Push every 1 hour PRN Pre/post blood products, medications, blood draw, and to maintain line patency      Social History: see consult note    Family history: see consult note    ROS:   ENT: all negative except as noted in HPI   Pulm: denies SOB, cough, hemoptysis  Neuro: denies numbness/tingling, loss of sensation  Endo: denies heat/cold intolerance, excessive sweating      Vital Signs Last 24 Hrs  T(C): 36.6 (06 Nov 2023 06:45), Max: 37.3 (05 Nov 2023 20:00)  T(F): 97.9 (06 Nov 2023 06:45), Max: 99.2 (05 Nov 2023 20:00)  HR: 114 (06 Nov 2023 07:00) (110 - 123)  BP: --  BP(mean): --  RR: 27 (06 Nov 2023 07:00) (23 - 31)  SpO2: 96% (06 Nov 2023 07:00) (90% - 100%)    Parameters below as of 06 Nov 2023 06:45  Patient On (Oxygen Delivery Method): ventilator                              9.0    13.69 )-----------( 451      ( 06 Nov 2023 01:10 )             29.5    11-06    132<L>  |  100  |  39<H>  ----------------------------<  175<H>  5.2   |  25  |  1.46<H>    Ca    8.6      06 Nov 2023 01:10  Phos  6.0     11-06  Mg     2.60     11-06    TPro  6.5  /  Alb  2.0<L>  /  TBili  0.2  /  DBili  x   /  AST  26  /  ALT  12  /  AlkPhos  315<H>  11-06   PTT - ( 05 Nov 2023 01:04 )  PTT:34.6 sec    PHYSICAL EXAM:  Gen: NAD  Skin: No rashes, bruises, or lesions  Head: Normocephalic, Atraumatic  Face: no edema, erythema, or fluctuance. Parotid glands soft without mass  Eyes: no scleral injection  Ears: Left - mild erythema in the sakina bowl, bacitracin ointment noted. Ear canal clear, no erythema, drainage or debri, unable to determine wether or not the TM is intact. No evidence of any fluid drainage. No mastoid tenderness, erythema, or ear bulging  Nose: Nares bilaterally patent, no discharge  Mouth: No Stridor / Drooling / Trismus.  Mucosa moist, tongue/uvula midline, oropharynx clear  Neck: + tracheostomy in place secured with posey. Flat, supple, no lymphadenopathy, trachea midline, no masses  Lymphatic: No lymphadenopathy  Resp: sedated on ventilator, breathing easily, no stridor  Neuro: facial nerve intact, no facial droop

## 2023-11-06 NOTE — PROGRESS NOTE ADULT - SUBJECTIVE AND OBJECTIVE BOX
Subjective: Patient seen and examined. No new events except as noted.   remains in ICU     REVIEW OF SYSTEMS:    Unable to obtain    MEDICATIONS:  MEDICATIONS  (STANDING):  albuterol/ipratropium for Nebulization 3 milliLiter(s) Nebulizer every 6 hours  apixaban 5 milliGRAM(s) Oral every 12 hours  artificial tears (preservative free) Ophthalmic Solution 1 Drop(s) Both EYES every 4 hours  atorvastatin 10 milliGRAM(s) Oral at bedtime  aztreonam  IVPB 2000 milliGRAM(s) IV Intermittent <User Schedule>  chlorhexidine 0.12% Liquid 15 milliLiter(s) Oral Mucosa every 12 hours  chlorhexidine 2% Cloths 1 Application(s) Topical daily  ciprofloxacin/hydrocortisone Suspension Otic 4 Drop(s) Left Ear two times a day  dextrose 5%. 1000 milliLiter(s) (50 mL/Hr) IV Continuous <Continuous>  dextrose 5%. 1000 milliLiter(s) (100 mL/Hr) IV Continuous <Continuous>  dextrose 50% Injectable 25 Gram(s) IV Push once  dextrose 50% Injectable 12.5 Gram(s) IV Push once  dextrose 50% Injectable 25 Gram(s) IV Push once  dextrose 50% Injectable 25 Gram(s) IV Push once  droxidopa 600 milliGRAM(s) Oral every 8 hours  epoetin odalis (EPOGEN) Injectable 71863 Unit(s) IV Push <User Schedule>  ferrous    sulfate Liquid 300 milliGRAM(s) Oral daily  glucagon  Injectable 1 milliGRAM(s) IntraMuscular once  insulin lispro (ADMELOG) corrective regimen sliding scale   SubCutaneous every 6 hours  insulin NPH human recombinant 6 Unit(s) SubCutaneous every 6 hours  levETIRAcetam  Solution 1000 milliGRAM(s) Oral daily  levETIRAcetam  Solution 250 milliGRAM(s) Oral <User Schedule>  midodrine. 40 milliGRAM(s) Oral <User Schedule>  norepinephrine Infusion 0.05 MICROgram(s)/kG/Min (6.23 mL/Hr) IV Continuous <Continuous>  pantoprazole  Injectable 40 milliGRAM(s) IV Push two times a day  petrolatum Ophthalmic Ointment 1 Application(s) Both EYES four times a day  sodium chloride 1 Gram(s) Oral two times a day  sodium chloride 3%  Inhalation 4 milliLiter(s) Inhalation every 6 hours      PHYSICAL EXAM:  T(C): 35.9 (11-06-23 @ 10:03), Max: 37.3 (11-05-23 @ 20:00)  HR: 126 (11-06-23 @ 10:03) (110 - 136)  BP: --  RR: 24 (11-06-23 @ 10:03) (13 - 28)  SpO2: 100% (11-06-23 @ 09:30) (90% - 100%)  Wt(kg): --  I&O's Summary    05 Nov 2023 07:01  -  06 Nov 2023 07:00  --------------------------------------------------------  IN: 1360 mL / OUT: 300 mL / NET: 1060 mL    06 Nov 2023 07:01  -  06 Nov 2023 10:15  --------------------------------------------------------  IN: 734.9 mL / OUT: 2800 mL / NET: -2065.1 mL              Appearance: NAD, +trach  +NGT  HEENT: dry oral mucosa  Lymphatic: No lymphadenopathy  Cardiovascular: Normal S1 S2, No JVD, No murmurs, No edema  Respiratory: Decreased BS, + trach to vent	  Neuro: opens eyes  Gastrointestinal: Soft, Non-tender, + BS, + NGT  Skin: No rashes, No ecchymoses, No cyanosis	  Extremities: No strength/ROM 2/2 sedation, + BL LE edema  Vascular: Peripheral pulses palpable 2+ bilaterally  Anasarca   Mode: AC/ CMV (Assist Control/ Continuous Mandatory Ventilation), RR (machine): 24, FiO2: 40, PEEP: 8, ITime: 0.8, MAP: 20, PC: 35, PIP: 43          LABS:    CARDIAC MARKERS:                                9.0    13.69 )-----------( 451      ( 06 Nov 2023 01:10 )             29.5     11-06    132<L>  |  100  |  39<H>  ----------------------------<  175<H>  5.2   |  25  |  1.46<H>    Ca    8.6      06 Nov 2023 01:10  Phos  6.0     11-06  Mg     2.60     11-06    TPro  6.5  /  Alb  2.0<L>  /  TBili  0.2  /  DBili  x   /  AST  26  /  ALT  12  /  AlkPhos  315<H>  11-06    proBNP:   Lipid Profile:   HgA1c:   TSH:     aBlood Gas Profile - Arterial (11.06.23 @ 08:08)   pH, Arterial: 7.25  pCO2, Arterial: 69 mmHg  pO2, Arterial: 62 mmHg  HCO3, Arterial: 30 mmol/L  Base Excess, Arterial: 2.0 mmol/L  Oxygen Saturation, Arterial: 90.1 %  Total CO2, Arterial: 32 mmol/L    Blood Gas Arterial - Calcium, Ionized: 1.22 mmol/L (11.06.23 @ 08:08)         TELEMETRY: 	SR ST     ECG:  	  RADIOLOGY:   DIAGNOSTIC TESTING:  [ ] Echocardiogram:  [ ]  Catheterization:  [ ] Stress Test:    OTHER:

## 2023-11-06 NOTE — PROGRESS NOTE ADULT - SUBJECTIVE AND OBJECTIVE BOX
[facility] [phone]        Patient seen and examined she had HD this morning 2.1L removed norepinephrine resumed for HD, now off.         MEDICATIONS  (STANDING):  albuterol/ipratropium for Nebulization 3 milliLiter(s) Nebulizer every 6 hours  apixaban 5 milliGRAM(s) Oral every 12 hours  artificial tears (preservative free) Ophthalmic Solution 1 Drop(s) Both EYES every 4 hours  atorvastatin 10 milliGRAM(s) Oral at bedtime  chlorhexidine 0.12% Liquid 15 milliLiter(s) Oral Mucosa every 12 hours  chlorhexidine 2% Cloths 1 Application(s) Topical daily  dextrose 5%. 1000 milliLiter(s) (50 mL/Hr) IV Continuous <Continuous>  dextrose 5%. 1000 milliLiter(s) (100 mL/Hr) IV Continuous <Continuous>  dextrose 50% Injectable 25 Gram(s) IV Push once  dextrose 50% Injectable 12.5 Gram(s) IV Push once  dextrose 50% Injectable 25 Gram(s) IV Push once  dextrose 50% Injectable 25 Gram(s) IV Push once  droxidopa 600 milliGRAM(s) Oral every 8 hours  epoetin odalis (EPOGEN) Injectable 18957 Unit(s) IV Push <User Schedule>  ferrous    sulfate Liquid 300 milliGRAM(s) Oral daily  glucagon  Injectable 1 milliGRAM(s) IntraMuscular once  insulin lispro (ADMELOG) corrective regimen sliding scale   SubCutaneous every 6 hours  insulin NPH human recombinant 6 Unit(s) SubCutaneous every 6 hours  levETIRAcetam  Solution 1000 milliGRAM(s) Oral daily  levETIRAcetam  Solution 250 milliGRAM(s) Oral <User Schedule>  midodrine. 40 milliGRAM(s) Oral <User Schedule>  norepinephrine Infusion 0.05 MICROgram(s)/kG/Min (6.23 mL/Hr) IV Continuous <Continuous>  pantoprazole  Injectable 40 milliGRAM(s) IV Push two times a day  petrolatum Ophthalmic Ointment 1 Application(s) Both EYES four times a day  sodium chloride 1 Gram(s) Oral two times a day  sodium chloride 3%  Inhalation 4 milliLiter(s) Inhalation every 6 hours      VITAL:  T(C): , Max: 37.3 (11-05-23 @ 20:00)  T(F): , Max: 99.2 (11-05-23 @ 20:00)  HR: 124 (11-06-23 @ 13:00)  BP: --  BP(mean): --  RR: 24 (11-06-23 @ 13:00)  SpO2: 96% (11-06-23 @ 13:00)  Wt(kg): --    I and O's:    11-05 @ 07:01  -  11-06 @ 07:00  --------------------------------------------------------  IN: 1360 mL / OUT: 300 mL / NET: 1060 mL    11-06 @ 07:01  -  11-06 @ 13:17  --------------------------------------------------------  IN: 909.7 mL / OUT: 2800 mL / NET: -1890.3 mL          PHYSICAL EXAM:  PHYSICAL EXAM:  Constitutional: critically ill, trach to vent   HEENT: + NGT, + tracheostomy   Respiratory: coarse BS  Cardiovascular: tachy   Gastrointestinal: BS+, soft, NT/ND  Extremities: ++ generalized edema  Neurological: UTO  : No Wheeler  Skin: No rashes  Access: [facility]         LABS:                        9.0    13.69 )-----------( 451      ( 06 Nov 2023 01:10 )             29.5     11-06    132<L>  |  100  |  39<H>  ----------------------------<  175<H>  5.2   |  25  |  1.46<H>    Ca    8.6      06 Nov 2023 01:10  Phos  6.0     11-06  Mg     2.60     11-06    TPro  6.5  /  Alb  2.0<L>  /  TBili  0.2  /  DBili  x   /  AST  26  /  ALT  12  /  AlkPhos  315<H>  11-06          Urine Studies:  Urinalysis Basic - ( 06 Nov 2023 01:10 )    Color: x / Appearance: x / SG: x / pH: x  Gluc: 175 mg/dL / Ketone: x  / Bili: x / Urobili: x   Blood: x / Protein: x / Nitrite: x   Leuk Esterase: x / RBC: x / WBC x   Sq Epi: x / Non Sq Epi: x / Bacteria: x            IMPRESSION: 73F w/ HTN, DM2, CVA, breast CA-bilateral mastectomy, recurrent aspiration pneumonia/respiratory failure, and CKD, 8/11/23 p/w acute hypercapnic respiratory failure; c/b RUSS    (1)Renal - RUSS on CKD4 ==>now newly ESRD. S/p HD today     (2)Hyponatremia - Na+ declining     (3)CV- tenuous hemodynamics such with each passing week we have more difficulty performing HD/achieving UF, persistent issue. BP stable with Angela, though continues to need pressors for HD    (4)ID- BCx from 10/14/23 NGTD, BC (10/17) NGTD on IV abx     (5)Pulm- trach/vent-dependent    (6)Anemia- hgb < goal though stable, epo with HD    RECOMMEND:  (1)HD today (compelted): 2 kg UF; pressors and sodium modelling as needed to keep SBP>90; Epogen with HD   (2)Dose new meds for GFR <10/HD    Tere Arauz NP  [facility]  [phone]        [facility] [phone]        Patient seen and examined she had HD this morning 2.1L removed norepinephrine resumed for HD, now off.         MEDICATIONS  (STANDING):  albuterol/ipratropium for Nebulization 3 milliLiter(s) Nebulizer every 6 hours  apixaban 5 milliGRAM(s) Oral every 12 hours  artificial tears (preservative free) Ophthalmic Solution 1 Drop(s) Both EYES every 4 hours  atorvastatin 10 milliGRAM(s) Oral at bedtime  chlorhexidine 0.12% Liquid 15 milliLiter(s) Oral Mucosa every 12 hours  chlorhexidine 2% Cloths 1 Application(s) Topical daily  dextrose 5%. 1000 milliLiter(s) (50 mL/Hr) IV Continuous <Continuous>  dextrose 5%. 1000 milliLiter(s) (100 mL/Hr) IV Continuous <Continuous>  dextrose 50% Injectable 25 Gram(s) IV Push once  dextrose 50% Injectable 12.5 Gram(s) IV Push once  dextrose 50% Injectable 25 Gram(s) IV Push once  dextrose 50% Injectable 25 Gram(s) IV Push once  droxidopa 600 milliGRAM(s) Oral every 8 hours  epoetin odalis (EPOGEN) Injectable 46351 Unit(s) IV Push <User Schedule>  ferrous    sulfate Liquid 300 milliGRAM(s) Oral daily  glucagon  Injectable 1 milliGRAM(s) IntraMuscular once  insulin lispro (ADMELOG) corrective regimen sliding scale   SubCutaneous every 6 hours  insulin NPH human recombinant 6 Unit(s) SubCutaneous every 6 hours  levETIRAcetam  Solution 1000 milliGRAM(s) Oral daily  levETIRAcetam  Solution 250 milliGRAM(s) Oral <User Schedule>  midodrine. 40 milliGRAM(s) Oral <User Schedule>  norepinephrine Infusion 0.05 MICROgram(s)/kG/Min (6.23 mL/Hr) IV Continuous <Continuous>  pantoprazole  Injectable 40 milliGRAM(s) IV Push two times a day  petrolatum Ophthalmic Ointment 1 Application(s) Both EYES four times a day  sodium chloride 1 Gram(s) Oral two times a day  sodium chloride 3%  Inhalation 4 milliLiter(s) Inhalation every 6 hours      VITAL:  T(C): , Max: 37.3 (11-05-23 @ 20:00)  T(F): , Max: 99.2 (11-05-23 @ 20:00)  HR: 124 (11-06-23 @ 13:00)  BP: --  BP(mean): --  RR: 24 (11-06-23 @ 13:00)  SpO2: 96% (11-06-23 @ 13:00)  Wt(kg): --    I and O's:    11-05 @ 07:01  -  11-06 @ 07:00  --------------------------------------------------------  IN: 1360 mL / OUT: 300 mL / NET: 1060 mL    11-06 @ 07:01  -  11-06 @ 13:17  --------------------------------------------------------  IN: 909.7 mL / OUT: 2800 mL / NET: -1890.3 mL          PHYSICAL EXAM:  PHYSICAL EXAM:  Constitutional: critically ill, trach to vent   HEENT: + NGT, + tracheostomy   Respiratory: coarse BS  Cardiovascular: tachy   Gastrointestinal: BS+, soft, NT/ND  Extremities: ++ generalized edema  Neurological: UTO  : No Wheeler  Skin: No rashes  Access: [facility]         LABS:                        9.0    13.69 )-----------( 451      ( 06 Nov 2023 01:10 )             29.5     11-06    132<L>  |  100  |  39<H>  ----------------------------<  175<H>  5.2   |  25  |  1.46<H>    Ca    8.6      06 Nov 2023 01:10  Phos  6.0     11-06  Mg     2.60     11-06    TPro  6.5  /  Alb  2.0<L>  /  TBili  0.2  /  DBili  x   /  AST  26  /  ALT  12  /  AlkPhos  315<H>  11-06          Urine Studies:  Urinalysis Basic - ( 06 Nov 2023 01:10 )    Color: x / Appearance: x / SG: x / pH: x  Gluc: 175 mg/dL / Ketone: x  / Bili: x / Urobili: x   Blood: x / Protein: x / Nitrite: x   Leuk Esterase: x / RBC: x / WBC x   Sq Epi: x / Non Sq Epi: x / Bacteria: x            IMPRESSION: 73F w/ HTN, DM2, CVA, breast CA-bilateral mastectomy, recurrent aspiration pneumonia/respiratory failure, and CKD, 8/11/23 p/w acute hypercapnic respiratory failure; c/b RUSS    (1)Renal - RUSS on CKD4 ==>now newly ESRD. S/p HD today     (2)Hyponatremia - Na+ declining     (3)CV- tenuous hemodynamics such with each passing week we have more difficulty performing HD/achieving UF, persistent issue. BP stable with [place], though continues to need pressors for HD    (4)ID- BCx from 10/14/23 NGTD, BC (10/17) NGTD on IV abx     (5)Pulm- trach/vent-dependent    (6)Anemia- hgb < goal though stable, epo with HD    RECOMMEND:  (1)HD today (compelted): 2 kg UF; pressors and sodium modelling as needed to keep SBP>90; Epogen with HD   (2)Dose new meds for GFR <10/HD    Tere Harlan [facility] NP  [facility]  [phone]       RENAL ATTENDING NOTE  Patient seen and examined with NP. Agree with assessment and plan as above.    Jimmy Colon MD  [facility]  [phone]

## 2023-11-06 NOTE — PROGRESS NOTE ADULT - ASSESSMENT
73 year old female with a hx HTN, DM, CVA, admitted for respiratory distress, prolonged intubation on vent s/p tracheostomy. Patient was previously evaluated by ENT for left OE. Had a wick placed and was treated with IV abx and drops. ENT signed off several weeks ago after improvement. Now having left ear drainage and excoriation to left Auricle. Trach + vent dependent. Infection markedly improved. Ear exam improved significantly this morning. Clean and dry external ear canal on the left with improved excoriation of the conchal bow and auricle. The mildly erythema might due to irritation from bacitracin. Patient completed 7 day course of Ciprodex drop.

## 2023-11-06 NOTE — CHART NOTE - NSCHARTNOTEFT_GEN_A_CORE
NUTRITION FOLLOW-UP      73 y.o. DM, HTN, CKD, CHFpEF, breast Ca, respiratory and cardiac arrest (2018), CVA w residual weakness s/p trach/PEG which has since been removed.  Presenting with respiratory distress requiring intubation.   Pt. with acute hypoxic respiratory failure with hypoxia and hypercapnia which is multifactorial including aspiration and acute on chronic diastolic CHF.  Also with tracheomalacia, s/p tracheostomy with course c/b bacteremia, L otitis externa, recurrent s/p ENT debridement x2 & RUSS/CKD now ESRD requiring HD.  More difficulty performing HD/achieving UF with each passing week due to low blood pressure.  Pt. requires pressor support during HD.  Also remains dependent on mechanical ventilation.      Spoke with RN.  Continues to receive enteral nutrition with Glucerna 1.5  On hold at time of RDN encounter due to HD, per nursing.  Liquid BM (11/6).  Would continue current enteral regimen, as tolerated, which adequately meets estimated energy needs, pending Pt. hemodynamically stable.    Weight status variable due to fluid.  Pt. with severe edema.      Per chart, poor prognosis.       _________________Diet___________________  Diet, NPO with Tube Feed:   Tube Feeding Modality: Nasogastric  Glucerna 1.5 Judd (GLUCERNA1.5RTH)  Total Volume for 24 Hours (mL): 960  Continuous  Starting Tube Feed Rate {mL per Hour}: 10  Increase Tube Feed Rate by (mL): 10     Every 4 hours  Until Goal Tube Feed Rate (mL per Hour): 40  Tube Feed Duration (in Hours): 24  Tube Feed Start Time: 17:30  Liquid Protein Supplement (LPS) (1pkg = 15gms Protein)     Qty per Day:  1 (10-20-23 @ 18:51) [Active]    provides:  960mL total volume  1440 kcals  79g protein (+15 g additional protein from Liquid Protein Supplement (LPS) )= 94g total protein   729mL free water       Weight:                    Height:   61"                Upper 10% Ideal Body Weight:  115.5lbs / 52.5kg   78.8kg (11/6)  78.5kg (11/3)  80.4kg (10/31)  75.8kg (10/24)  78.2kg (10/19)  78.5kg (10/2)  83.2kg (9/24)  67.8kg (9/10)  58.0kg (8/31)  66.5kg (8/11 on admit)    _____Estimated Energy Needs (based upper 10% Ideal Body Weight)_____  25-30 kcals/kg = 0363-5874 kcals/d  1.5-1.8.g protein/kg = 80-95g protein/d    Edema: 3+ generalized  Skin: L inner ear wound.          ________________________Pertinent Medications____________   MEDICATIONS  (STANDING):  albuterol/ipratropium for Nebulization 3 milliLiter(s) Nebulizer every 6 hours  apixaban 5 milliGRAM(s) Oral every 12 hours  artificial tears (preservative free) Ophthalmic Solution 1 Drop(s) Both EYES every 4 hours  atorvastatin 10 milliGRAM(s) Oral at bedtime  chlorhexidine 0.12% Liquid 15 milliLiter(s) Oral Mucosa every 12 hours  chlorhexidine 2% Cloths 1 Application(s) Topical daily  dextrose 5%. 1000 milliLiter(s) (50 mL/Hr) IV Continuous <Continuous>  dextrose 5%. 1000 milliLiter(s) (100 mL/Hr) IV Continuous <Continuous>  dextrose 50% Injectable 25 Gram(s) IV Push once  dextrose 50% Injectable 12.5 Gram(s) IV Push once  dextrose 50% Injectable 25 Gram(s) IV Push once  dextrose 50% Injectable 25 Gram(s) IV Push once  droxidopa 600 milliGRAM(s) Oral every 8 hours  epoetin odalis (EPOGEN) Injectable 22462 Unit(s) IV Push <User Schedule>  ferrous    sulfate Liquid 300 milliGRAM(s) Oral daily  glucagon  Injectable 1 milliGRAM(s) IntraMuscular once  insulin lispro (ADMELOG) corrective regimen sliding scale   SubCutaneous every 6 hours  insulin NPH human recombinant 6 Unit(s) SubCutaneous every 6 hours  levETIRAcetam  Solution 1000 milliGRAM(s) Oral daily  levETIRAcetam  Solution 250 milliGRAM(s) Oral <User Schedule>  midodrine. 40 milliGRAM(s) Oral <User Schedule>  norepinephrine Infusion 0.05 MICROgram(s)/kG/Min (6.23 mL/Hr) IV Continuous <Continuous>  pantoprazole  Injectable 40 milliGRAM(s) IV Push two times a day  petrolatum Ophthalmic Ointment 1 Application(s) Both EYES four times a day  sodium chloride 1 Gram(s) Oral two times a day  sodium chloride 3%  Inhalation 4 milliLiter(s) Inhalation every 6 hours    MEDICATIONS  (PRN):  acetaminophen   Oral Liquid .. 650 milliGRAM(s) Oral every 6 hours PRN Temp greater or equal to 38C (100.4F), Mild Pain (1 - 3)  dextrose Oral Gel 15 Gram(s) Oral once PRN Blood Glucose LESS THAN 70 milliGRAM(s)/deciliter  diphenhydrAMINE Elixir 50 milliGRAM(s) Oral once PRN Rash and/or Itching  droxidopa 200 milliGRAM(s) Oral <User Schedule> PRN Prior to hemodialysis  midodrine. 20 milliGRAM(s) Oral <User Schedule> PRN 1 Hour Pre HD  sodium chloride 0.9% Bolus. 250 milliLiter(s) IV Bolus every 5 minutes PRN SBP LESS THAN or EQUAL to 90 mmHg  sodium chloride 0.9% lock flush 10 milliLiter(s) IV Push every 1 hour PRN Pre/post blood products, medications, blood draw, and to maintain line patency          _________________________Pertinent Labs____________________     11-06    132<L>  |  100  |  39<H>  ----------------------------<  175<H>  5.2   |  25  |  1.46<H>    Ca    8.6      06 Nov 2023 01:10  Phos  6.0     11-06  Mg     2.60     11-06    TPro  6.5  /  Alb  2.0<L>  /  TBili  0.2  /  DBili  x   /  AST  26  /  ALT  12  /  AlkPhos  315<H>  11-06                                                                   9.0    13.69 )-----------( 451      ( 06 Nov 2023 01:10 )             29.5         CAPILLARY BLOOD GLUCOSE      POCT Blood Glucose.: 134 mg/dL (06 Nov 2023 12:29)    POCT Blood Glucose.: 134 mg/dL (11-06-23 @ 12:29)  POCT Blood Glucose.: 144 mg/dL (11-06-23 @ 05:35)  POCT Blood Glucose.: 178 mg/dL (11-06-23 @ 00:27)  POCT Blood Glucose.: 142 mg/dL (11-05-23 @ 17:43)        ________NUTRITION Dx_________    Pt. remains severely malnourished       PLAN/RECOMMENDATIONS:    1) Continue current enteral regimen, as tolerated.  2) Obtain daily & pre/post HD weights  3) Monitor tolerance to TF, GI status, electrolytes, glucose     RDN remains available and will f/u PRN.          Jaylene Ybarra RDN, CDN       pager [phone] or [place] Teams

## 2023-11-07 NOTE — PROGRESS NOTE ADULT - SUBJECTIVE AND OBJECTIVE BOX
Patient is a 73y old  Female who presents with a chief complaint of Respiratory distress (07 Nov 2023 17:26)      SUBJECTIVE / OVERNIGHT EVENTS: trache to vent, off ABx, pressor assisted HD MWF    MEDICATIONS  (STANDING):  albuterol/ipratropium for Nebulization 3 milliLiter(s) Nebulizer every 6 hours  apixaban 5 milliGRAM(s) Oral every 12 hours  artificial tears (preservative free) Ophthalmic Solution 1 Drop(s) Both EYES every 4 hours  atorvastatin 10 milliGRAM(s) Oral at bedtime  calamine/zinc oxide Lotion 1 Application(s) Topical daily  chlorhexidine 0.12% Liquid 15 milliLiter(s) Oral Mucosa every 12 hours  chlorhexidine 2% Cloths 1 Application(s) Topical daily  dextrose 5%. 1000 milliLiter(s) (100 mL/Hr) IV Continuous <Continuous>  dextrose 5%. 1000 milliLiter(s) (50 mL/Hr) IV Continuous <Continuous>  dextrose 50% Injectable 25 Gram(s) IV Push once  dextrose 50% Injectable 12.5 Gram(s) IV Push once  dextrose 50% Injectable 25 Gram(s) IV Push once  dextrose 50% Injectable 25 Gram(s) IV Push once  droxidopa 600 milliGRAM(s) Oral every 8 hours  epoetin odalis (EPOGEN) Injectable 39393 Unit(s) IV Push <User Schedule>  ferrous    sulfate Liquid 300 milliGRAM(s) Oral daily  glucagon  Injectable 1 milliGRAM(s) IntraMuscular once  insulin lispro (ADMELOG) corrective regimen sliding scale   SubCutaneous every 6 hours  insulin NPH human recombinant 6 Unit(s) SubCutaneous every 6 hours  levETIRAcetam  Solution 1000 milliGRAM(s) Oral daily  levETIRAcetam  Solution 250 milliGRAM(s) Oral <User Schedule>  midodrine. 40 milliGRAM(s) Oral <User Schedule>  norepinephrine Infusion 0.05 MICROgram(s)/kG/Min (6.23 mL/Hr) IV Continuous <Continuous>  pantoprazole  Injectable 40 milliGRAM(s) IV Push two times a day  petrolatum Ophthalmic Ointment 1 Application(s) Both EYES four times a day  sodium chloride 1 Gram(s) Oral two times a day  sodium chloride 3%  Inhalation 4 milliLiter(s) Inhalation every 6 hours    MEDICATIONS  (PRN):  acetaminophen   Oral Liquid .. 650 milliGRAM(s) Oral every 6 hours PRN Temp greater or equal to 38C (100.4F), Mild Pain (1 - 3)  dextrose Oral Gel 15 Gram(s) Oral once PRN Blood Glucose LESS THAN 70 milliGRAM(s)/deciliter  diphenhydrAMINE Elixir 50 milliGRAM(s) Oral once PRN Rash and/or Itching  droxidopa 600 milliGRAM(s) Oral <User Schedule> PRN prior to hemodialysis  midodrine. 20 milliGRAM(s) Oral <User Schedule> PRN 1 Hour Pre HD  sodium chloride 0.9% Bolus. 250 milliLiter(s) IV Bolus every 5 minutes PRN SBP LESS THAN or EQUAL to 90 mmHg  sodium chloride 0.9% lock flush 10 milliLiter(s) IV Push every 1 hour PRN Pre/post blood products, medications, blood draw, and to maintain line patency      Vital Signs Last 24 Hrs  T(F): 100.2 (11-07-23 @ 16:00), Max: 100.2 (11-07-23 @ 16:00)  HR: 121 (11-07-23 @ 19:15) (111 - 134)  BP: --  RR: 26 (11-07-23 @ 18:00) (19 - 26)  SpO2: 98% (11-07-23 @ 19:15) (98% - 100%)  Telemetry:   CAPILLARY BLOOD GLUCOSE      POCT Blood Glucose.: 190 mg/dL (07 Nov 2023 18:36)  POCT Blood Glucose.: 159 mg/dL (07 Nov 2023 12:54)  POCT Blood Glucose.: 124 mg/dL (07 Nov 2023 05:29)  POCT Blood Glucose.: 151 mg/dL (06 Nov 2023 23:46)    I&O's Summary    06 Nov 2023 07:01  -  07 Nov 2023 07:00  --------------------------------------------------------  IN: 1869.7 mL / OUT: 2900 mL / NET: -1030.3 mL    07 Nov 2023 07:01  -  07 Nov 2023 19:55  --------------------------------------------------------  IN: 440 mL / OUT: 0 mL / NET: 440 mL        PHYSICAL EXAM:  GENERAL: NAD, well-developed  HEAD:  Atraumatic, Normocephalic  EYES: EOMI, PERRLA, conjunctiva and sclera clear  NECK: Supple, No JVD  CHEST/LUNG: Clear to auscultation bilaterally; No wheeze  HEART: Regular rate and rhythm; No murmurs, rubs, or gallops  ABDOMEN: Soft, Nontender, Nondistended; Bowel sounds present  EXTREMITIES:  2+ Peripheral Pulses, No clubbing, cyanosis, or edema  PSYCH: AAOx3  NEUROLOGY: non-focal  SKIN: No rashes or lesions    LABS:                        9.0    12.20 )-----------( 446      ( 07 Nov 2023 01:00 )             29.8     11-07    138  |  102  |  30<H>  ----------------------------<  152<H>  4.7   |  25  |  1.23    Ca    8.4      07 Nov 2023 01:00  Phos  4.6     11-07  Mg     2.40     11-07    TPro  6.5  /  Alb  2.0<L>  /  TBili  <0.2  /  DBili  x   /  AST  27  /  ALT  14  /  AlkPhos  334<H>  11-07          Urinalysis Basic - ( 07 Nov 2023 01:00 )    Color: x / Appearance: x / SG: x / pH: x  Gluc: 152 mg/dL / Ketone: x  / Bili: x / Urobili: x   Blood: x / Protein: x / Nitrite: x   Leuk Esterase: x / RBC: x / WBC x   Sq Epi: x / Non Sq Epi: x / Bacteria: x        RADIOLOGY & ADDITIONAL TESTS:    Imaging Personally Reviewed:    Consultant(s) Notes Reviewed:      Care Discussed with Consultants/Other Providers:

## 2023-11-07 NOTE — PROGRESS NOTE ADULT - ASSESSMENT
74 y/o F [facility] she was admitted at [facility] 7/12-7/22 w aspiration PNA, was treated w CEFEPIME, developed an allergic rash,  dCHF, + MAC on AFB culture, had been progressively getting more and more lethargic and dyspneic at home since DC.   In  am of 8/11/23  ptn presented with respiratory distress w hypoxia and hypercarbia requiring intubation 2/2 volume overload +/- Asp PNA      Neuro   not responsive  Baseline MS AOx3, aphasic   - h/o CVA , on aspirin and statin . resumed w feeding tube, ASA resumed 8/26  - eeg  2/2 tremors, no sz focus, right facial twitching, on KEPPRA  - less responsive in the past few days  - MRI 8/17:  new R cerebellar infarct, old left PCA/Occipital infarct. probably embolic in nature, did not tolerate full AC in the past, STEPHANIE is neg , no shunts observed  - ptn is poorly reactive, rpt scan done, no further CVA seen.     Cardiac   cardiology following  CHFpEF   TTE 7/2023 with EF 59%, with severe LVH and diastolic dysfunction   off Levo drip , now only during HD  on midodrine 40 mg qid, droxidopa 600 tid, additional 200 mg prior to HD,     Pulmonary   Acute hypercapnic and hypoxemic respiratory failure   prolonged intubation, now  trache to  vent, has copious secretions      Renal   on HD via L IJ [place], tunneled catheter when clinically stable  HD MWF, midodrine and pressor assisted,   permacath when clinically stable      GI  NPO  on tube feeds  UGI bleed 8/31,  EGD/Colonoscopy: erosive gastropathy, esophagitis on colonoscopy old blood seen no active bleeding, poor prep  NGT-TF  s/p 2UPRBC 11/4    Endocrine  on Insulin: NPH, Admelog    ID   complicated infectious hospital course  treated for MSSA bacteremia, aspiration PNA.  sputum + for pseudomonas, ptn was initially on zosyn but was allergic, then was switched to aztreonam   Aztreonam was switched to polymyxin for a possible allergy but ptn didnt have a reaction to aztreonam, she had a reaction to Zosyn.   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 externa resolved    Heme/Onc  on eliquis for  LEFT POPLITEAL VEIN ACUTE DVT , on ELiquis    Ethics  GOC - Discussed GOC with daughter and , they have opted in the past for full code. and she remains full code at present

## 2023-11-07 NOTE — PROGRESS NOTE ADULT - ATTENDING COMMENTS
Patient examined and case reviewed in detail on bedside rounds  Critically ill and unstable on vent/pressors during HD Will give additional dose of oral agent pre HD  Frequent bedside visits with therapy change today.   I have personally and independently provided 35+ minutes of critical care services; this excludes time spent on separate procedures or teaching

## 2023-11-07 NOTE — PROGRESS NOTE ADULT - SUBJECTIVE AND OBJECTIVE BOX
NEPHROLOGY     Patient seen and examined with spouse at bedside, pt trach to vent, in no acute distress, HD yesterday remove 2.1L.     MEDICATIONS  (STANDING):  albuterol/ipratropium for Nebulization 3 milliLiter(s) Nebulizer every 6 hours  apixaban 5 milliGRAM(s) Oral every 12 hours  artificial tears (preservative free) Ophthalmic Solution 1 Drop(s) Both EYES every 4 hours  atorvastatin 10 milliGRAM(s) Oral at bedtime  calamine/zinc oxide Lotion 1 Application(s) Topical daily  chlorhexidine 0.12% Liquid 15 milliLiter(s) Oral Mucosa every 12 hours  chlorhexidine 2% Cloths 1 Application(s) Topical daily  dextrose 5%. 1000 milliLiter(s) (50 mL/Hr) IV Continuous <Continuous>  dextrose 5%. 1000 milliLiter(s) (100 mL/Hr) IV Continuous <Continuous>  dextrose 50% Injectable 25 Gram(s) IV Push once  dextrose 50% Injectable 12.5 Gram(s) IV Push once  dextrose 50% Injectable 25 Gram(s) IV Push once  dextrose 50% Injectable 25 Gram(s) IV Push once  droxidopa 600 milliGRAM(s) Oral every 8 hours  epoetin odalis (EPOGEN) Injectable 41310 Unit(s) IV Push <User Schedule>  ferrous    sulfate Liquid 300 milliGRAM(s) Oral daily  glucagon  Injectable 1 milliGRAM(s) IntraMuscular once  insulin lispro (ADMELOG) corrective regimen sliding scale   SubCutaneous every 6 hours  insulin NPH human recombinant 6 Unit(s) SubCutaneous every 6 hours  levETIRAcetam  Solution 1000 milliGRAM(s) Oral daily  levETIRAcetam  Solution 250 milliGRAM(s) Oral <User Schedule>  midodrine. 40 milliGRAM(s) Oral <User Schedule>  norepinephrine Infusion 0.05 MICROgram(s)/kG/Min (6.23 mL/Hr) IV Continuous <Continuous>  pantoprazole  Injectable 40 milliGRAM(s) IV Push two times a day  petrolatum Ophthalmic Ointment 1 Application(s) Both EYES four times a day  sodium chloride 1 Gram(s) Oral two times a day  sodium chloride 3%  Inhalation 4 milliLiter(s) Inhalation every 6 hours    VITALS:  T(C): , Max: 37.8 (11-07-23 @ 08:00)  T(F): , Max: 100 (11-07-23 @ 08:00)  HR: 114 (11-07-23 @ 09:00)  BP: --  RR: 19 (11-07-23 @ 09:00)  SpO2: 98% (11-07-23 @ 09:00)    I and O's:    11-06 @ 07:01  -  11-07 @ 07:00  --------------------------------------------------------  IN: 1869.7 mL / OUT: 2900 mL / NET: -1030.3 mL    PHYSICAL EXAM:  Constitutional: critically ill, trach to vent   HEENT: + NGT, + tracheostomy   Respiratory: coarse BS  Cardiovascular: tachy   Gastrointestinal: BS+, soft, NT/ND  Extremities: ++ generalized edema  Neurological: UTO  : No Wheeler  Skin: No rashes  Access: [facility]     LABS:                        9.0    12.20 )-----------( 446      ( 07 Nov 2023 01:00 )             29.8     11-07    138  |  102  |  30<H>  ----------------------------<  152<H>  4.7   |  25  |  1.23    Ca    8.4      07 Nov 2023 01:00  Phos  4.6     11-07  Mg     2.40     11-07    TPro  6.5  /  Alb  2.0<L>  /  TBili  <0.2  /  DBili  x   /  AST  27  /  ALT  14  /  AlkPhos  334<H>  11-07

## 2023-11-07 NOTE — PROGRESS NOTE ADULT - ASSESSMENT

## 2023-11-07 NOTE — PROGRESS NOTE ADULT - ASSESSMENT
IMPRESSION: 73F w/ HTN, DM2, CVA, breast CA-bilateral mastectomy, recurrent aspiration pneumonia/respiratory failure, and CKD, 8/11/23 p/w acute hypercapnic respiratory failure; c/b RUSS    (1)Renal - RUSS on CKD4 ==>now newly ESRD. S/p HD yesterday, due tomorrow     (2)Hyponatremia - Na+ improving with HD    (3)CV- tenuous hemodynamics such with each passing week we have more difficulty performing HD/achieving UF, persistent issue. BP stable with [place], though continues to need pressors for HD    (4)ID- BCx from 10/14/23 NGTD, BC (10/17) NGTD - s/p IV abx     (5)Pulm- trach/vent-dependent    (6)Anemia- hgb < goal though stable, epo with HD    RECOMMEND:  (1)HD tomorrow: 2 kg UF; pressors and sodium modelling as needed to keep SBP>90; Epogen with HD   (2)Dose new meds for GFR <10/HD    Nilda Espinoza DNP  [facility]  [phone]            IMPRESSION: 73F w/ HTN, DM2, CVA, breast CA-bilateral mastectomy, recurrent aspiration pneumonia/respiratory failure, and CKD, 8/11/23 p/w acute hypercapnic respiratory failure; c/b RUSS    (1)Renal - RUSS on CKD4 ==>now newly ESRD. S/p HD yesterday, due tomorrow     (2)Hyponatremia - Na+ improving with HD    (3)CV- tenuous hemodynamics such with each passing week we have more difficulty performing HD/achieving UF, persistent issue. BP stable with [place], though continues to need pressors for HD    (4)ID- BCx from 10/14/23 NGTD, BC (10/17) NGTD - s/p IV abx     (5)Pulm- trach/vent-dependent    (6)Anemia- hgb < goal though stable, epo with HD    RECOMMEND:  (1)HD tomorrow: pressors and sodium modelling as needed to keep SBP>90; Epogen with HD   (2)Dose new meds for GFR <10/HD    Nilda Espinoza DNP  [facility]  [phone]     RENAL ATTENDING NOTE  Patient seen and examined with NP. Agree with assessment and plan as above.  We can attempt 2.4L UF tomorrow, as she has been tolerating slightly over 2L/treatment of late    Jimmy Colon MD  [facility]  [phone]

## 2023-11-07 NOTE — PROGRESS NOTE ADULT - ASSESSMENT
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       NEUROLOGY  #AMS  Multiple etiologies including active infection vs CVA.   MR head performed w/ new infarct and old infarcts, EEG without seizure events but with high foci potential   - f/u spot EEG given facial twitching: negative for epileptiform activity (10/31)  - deferring repeat MRI as unlikely to   - continue lipitor  - continue keppra given above  - aspirin held 10/11, likely in s/o GIB, Hgb stable not requiring pRBC since 10/14      CARDIOVASCULAR  # Septic vs vasoplegic shock   Etiology likely septic iso active infection. Possible component of vasoplegic, however no longer with active infection or on sedation. Off IV vasopressors.   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   - resolved    RESPIRATORY  # AHRF second to volume overload   - CKD vs HFpEF w/ hypercarbia 2/2 volume overload requiring intubation, trach, and HD initiation  - Continue on albuterol and chest PT  - Continue volume removal with HD      #tracheomalacia   - CT CHEST (9/8) with tracheomalacia, BL pleural effusions and continued consolidations     GI  # UGIB: last pRBC transfused 11/4 (10/14 before that)   - Continue on PPI BID    # Dysphagia   - NGT-TF     RENAL  # ESRD  - HD MWF TIW with midodrine and droxidopa prior  - ICU for vasopressor assisted volume removal, cap to 1 pressor and not offering CRRT due to comorbidities and prognosis   - f/u w/ nephrology: will continue to offer patient 1-pressor assisted HD as agreed prior, as long as she can tolerate HD maxed on 1 pressor, she will be considered iHD candidate  - UF -2.5L attempted 10/30, reached 1.7L limited by hypotension and tachycardia to 130s, able to remove 2L 11/1, 2L 11/3    INFECTIOUS DISEASE  #Sepsis: temp 101.2 on 11/2; sources include skin (poorly demarcated area of induration/erythema, enlarging on RLQ), pulm (copious sputum)  - Bcx, sputum cx sent  - repeat MRSA PCR neg  - ID re-consulted: abd edema likely 2/2 chronic panniculitis  - Abx:      --> Vanc (11/2) - MRSA neg, dc'd     --> Aztreo 2g     # 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 Coli sputum Cx, BAL w/ MSSA  - treated with abx   - eosinophilia workup: JORGE, ANCAs negative    # MAC   - outpatient treatment    HEME  # Anemia second to GIB vs renal disease   - multiple transfusions, last done 11/4  - management as per renal, PPI    #Allergic transfusion reaction: per RCU documentation, developed erythematous rash across chest shortly after starting transfusion, blood bank consulted and diagnosed mild allergic rxn  - premedicate w/ benadryl and famotidine prior to future transfusions    VASCULAR   # LLE POP DVT   - on Eliquis    # HD access  - TRISHA TAYLOR (9/27 - 10/21)  - TRISHA taylor replaced for HD     ONC   # Hx of Breast CA   - Patient dx in 2018 and s/p BL mastectomy (radical on right) and chemo and RT.     ENDOCRINE  # IDDM2   - Continued on NPH with ISS, however noted with hypoglycemic episodes and NPH dc'ed.    - Finger sticks trending up off NPH.   - Continue on NPH 6U with moderate ISS.   - Adjust as need     SKIN  # Drug Eruption   - treated    ETHICS/ GOC    - Attempted palliative discussion however family not interested and wishes for FULL CODE  - Family continues to want everything done despite inability to tolerate HD on multiple oral pressor  - Levo capped at 0.3 during HD  72 YO F with PMHx of IDDM2, HTN, Diabetic Nephropathic CKD, HFpEF, Breast Cancer s/p BL mastectomy, chemotherapy and radiation (2018), Respiratory and Cardiac Arrest (2018), 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       NEUROLOGY  #AMS  Multiple etiologies including active infection vs CVA.   MR head performed w/ new infarct and old infarcts, EEG without seizure events but with high foci potential   - f/u spot EEG given facial twitching: negative for epileptiform activity (10/31)  - deferring repeat MRI as unlikely to   - continue lipitor  - continue keppra given above  - aspirin held 10/11, likely in s/o GIB, Hgb stable not requiring pRBC since 10/14      CARDIOVASCULAR  # Septic vs vasoplegic shock   Etiology likely septic iso active infection. Possible component of vasoplegic, however no longer with active infection or on sedation. Off IV vasopressors.   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   - resolved    RESPIRATORY  # AHRF second to volume overload   - CKD vs HFpEF w/ hypercarbia 2/2 volume overload requiring intubation, trach, and HD initiation  - Continue on albuterol and chest PT  - Continue volume removal with HD      #tracheomalacia   - CT CHEST (9/8) with tracheomalacia, BL pleural effusions and continued consolidations     GI  # UGIB: last pRBC transfused 11/4 (10/14 before that)   - Continue on PPI BID    # Dysphagia   - NGT-TF     RENAL  # ESRD  - HD MWF TIW with midodrine and droxidopa prior  - ICU for vasopressor assisted volume removal, cap to 1 pressor and not offering CRRT due to comorbidities and prognosis   - f/u w/ nephrology: will continue to offer patient 1-pressor assisted HD as agreed prior, as long as she can tolerate HD maxed on 1 pressor, she will be considered iHD candidate  - UF -2.5L attempted 10/30, reached 1.7L limited by hypotension and tachycardia to 130s, able to remove 2L 11/1, 2L 11/3, 2L 11/6    INFECTIOUS DISEASE  #Sepsis: temp 101.2 on 11/2; sources include skin (poorly demarcated area of induration/erythema, enlarging on RLQ), pulm (copious sputum)  - Bcx, sputum cx sent  - repeat MRSA PCR neg  - ID re-consulted: abd edema likely 2/2 chronic panniculitis  - Abx:      --> Vanc (11/2) - MRSA neg, dc'd     --> Aztreo 2g (11/2) --> dc'd, 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 Coli sputum Cx, BAL w/ MSSA  - treated with abx   - eosinophilia workup: JORGE, ANCAs negative    # MAC   - outpatient treatment    HEME  # Anemia second to GIB vs renal disease   - multiple transfusions, last done 11/4  - management as per renal, PPI    #Allergic transfusion reaction: per RCU documentation, developed erythematous rash across chest shortly after starting transfusion, blood bank consulted and diagnosed mild allergic rxn  - premedicate w/ benadryl and famotidine prior to future transfusions    VASCULAR   # LLE POP DVT   - on Eliquis    # HD access  - TRISHA TAYLOR (9/27 - 10/21)  - TRISHA taylor replaced for HD     ONC   # Hx of Breast CA   - Patient dx in 2018 and s/p BL mastectomy (radical on right) and chemo and RT.     ENDOCRINE  # IDDM2   - Continued on NPH with ISS, however noted with hypoglycemic episodes and NPH dc'ed.    - Finger sticks trending up off NPH.   - Continue on NPH 6U with moderate ISS.   - Adjust as need     SKIN  # Drug Eruption   - treated    ETHICS/ GOC    - Attempted palliative discussion however family not interested and wishes for FULL CODE  - Family continues to want everything done despite inability to tolerate HD on multiple oral pressor  - Levo capped at 0.3 during HD

## 2023-11-07 NOTE — PROGRESS NOTE ADULT - SUBJECTIVE AND OBJECTIVE BOX
S:  Pt seen and examined.     Medications: MEDICATIONS  (STANDING):  albuterol/ipratropium for Nebulization 3 milliLiter(s) Nebulizer every 6 hours  apixaban 5 milliGRAM(s) Oral every 12 hours  artificial tears (preservative free) Ophthalmic Solution 1 Drop(s) Both EYES every 4 hours  atorvastatin 10 milliGRAM(s) Oral at bedtime  calamine/zinc oxide Lotion 1 Application(s) Topical daily  chlorhexidine 0.12% Liquid 15 milliLiter(s) Oral Mucosa every 12 hours  chlorhexidine 2% Cloths 1 Application(s) Topical daily  dextrose 5%. 1000 milliLiter(s) (50 mL/Hr) IV Continuous <Continuous>  dextrose 5%. 1000 milliLiter(s) (100 mL/Hr) IV Continuous <Continuous>  dextrose 50% Injectable 25 Gram(s) IV Push once  dextrose 50% Injectable 12.5 Gram(s) IV Push once  dextrose 50% Injectable 25 Gram(s) IV Push once  dextrose 50% Injectable 25 Gram(s) IV Push once  droxidopa 600 milliGRAM(s) Oral every 8 hours  epoetin odalis (EPOGEN) Injectable 24995 Unit(s) IV Push <User Schedule>  ferrous    sulfate Liquid 300 milliGRAM(s) Oral daily  glucagon  Injectable 1 milliGRAM(s) IntraMuscular once  insulin lispro (ADMELOG) corrective regimen sliding scale   SubCutaneous every 6 hours  insulin NPH human recombinant 6 Unit(s) SubCutaneous every 6 hours  levETIRAcetam  Solution 1000 milliGRAM(s) Oral daily  levETIRAcetam  Solution 250 milliGRAM(s) Oral <User Schedule>  midodrine. 40 milliGRAM(s) Oral <User Schedule>  norepinephrine Infusion 0.05 MICROgram(s)/kG/Min (6.23 mL/Hr) IV Continuous <Continuous>  pantoprazole  Injectable 40 milliGRAM(s) IV Push two times a day  petrolatum Ophthalmic Ointment 1 Application(s) Both EYES four times a day  sodium chloride 1 Gram(s) Oral two times a day  sodium chloride 3%  Inhalation 4 milliLiter(s) Inhalation every 6 hours    MEDICATIONS  (PRN):  acetaminophen   Oral Liquid .. 650 milliGRAM(s) Oral every 6 hours PRN Temp greater or equal to 38C (100.4F), Mild Pain (1 - 3)  dextrose Oral Gel 15 Gram(s) Oral once PRN Blood Glucose LESS THAN 70 milliGRAM(s)/deciliter  diphenhydrAMINE Elixir 50 milliGRAM(s) Oral once PRN Rash and/or Itching  droxidopa 600 milliGRAM(s) Oral <User Schedule> PRN prior to hemodialysis  midodrine. 20 milliGRAM(s) Oral <User Schedule> PRN 1 Hour Pre HD  sodium chloride 0.9% Bolus. 250 milliLiter(s) IV Bolus every 5 minutes PRN SBP LESS THAN or EQUAL to 90 mmHg  sodium chloride 0.9% lock flush 10 milliLiter(s) IV Push every 1 hour PRN Pre/post blood products, medications, blood draw, and to maintain line patency       Vitals:  Vital Signs Last 24 Hrs  T(C): 37.8 (07 Nov 2023 12:00), Max: 37.8 (07 Nov 2023 08:00)  T(F): 100 (07 Nov 2023 12:00), Max: 100 (07 Nov 2023 08:00)  HR: 124 (07 Nov 2023 14:00) (111 - 128)  BP: --  BP(mean): --  RR: 24 (07 Nov 2023 14:00) (19 - 25)  SpO2: 99% (07 Nov 2023 14:00) (98% - 100%)    Parameters below as of 07 Nov 2023 12:00  Patient On (Oxygen Delivery Method): ventilator    O2 Concentration (%): 40        LABS:                          9.0    12.20 )-----------( 446      ( 07 Nov 2023 01:00 )             29.8     11-07    138  |  102  |  30<H>  ----------------------------<  152<H>  4.7   |  25  |  1.23    Ca    8.4      07 Nov 2023 01:00  Phos  4.6     11-07  Mg     2.40     11-07    TPro  6.5  /  Alb  2.0<L>  /  TBili  <0.2  /  DBili  x   /  AST  27  /  ALT  14  /  AlkPhos  334<H>  11-07    LIVER FUNCTIONS - ( 07 Nov 2023 01:00 )  Alb: 2.0 g/dL / Pro: 6.5 g/dL / ALK PHOS: 334 U/L / ALT: 14 U/L / AST: 27 U/L / GGT: x             Urinalysis Basic - ( 07 Nov 2023 01:00 )    Color: x / Appearance: x / SG: x / pH: x  Gluc: 152 mg/dL / Ketone: x  / Bili: x / Urobili: x   Blood: x / Protein: x / Nitrite: x   Leuk Esterase: x / RBC: x / WBC x   Sq Epi: x / Non Sq Epi: x / Bacteria: x          Neurological Exam:  Mental Status: Eyes closed, minimal spont eye opening off sedation. Does not follow commands,  + trach to vent and NGT   Cranial Nerves: PERRL slightly sluggish, R facial twitching noted by mouth lessened today- ? suppressible. occasional head dystonia  Motor: Moves upper extremities spontaneously R >L, tremor R > L  no movement noted in lowers  Sensation: WD to noxious x4    I personally reviewed the below data/images/labs:        < from: CT Head No Cont (08.15.23 @ 17:20) >    ACC: [id number] EXAM:  CT BRAIN   ORDERED BY: MINAL SAM     PROCEDURE DATE:  08/15/2023          INTERPRETATION:  Clinical indication: Change in neuro exam.    Multiple axial sections were performed from base to vertex without   contrast enhancement. Coronal and sagittal reconstructions were   reformatted well.    This exam is compared prior head CT performed on August 11, 2023    Parenchymal volume loss and chronic microvessel ischemic changes are   again seen.    Abnormal low-attenuation involving the left occipital cortical   subcortical region is again seen. 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    No evidence of acute hemorrhage mass or mass effect is seen.    Evaluation of the osseous structures with appropriate window demonstrates   sclerotic changes about the left mastoid region which appears stable.   Opacification left middle ear region is again seen.    Patient is status post bilateral cataract surgery.    Impression: Stable exam.    < end of copied text >    vEEG:    Clinical Impression:  - Severe diffuse non-specific cerebral dysfunction  - There were no epileptiform abnormalities or seizures recorded.        CTH 8/11:    VENTRICLES AND SULCI: Age-appropriate involutional change  INTRA-AXIAL:  Old left PCA infarct as seen on the prior unchanged.   Microvascular ischemic changes involving the periventricular and   subcortical white matter as seen previously  EXTRA-AXIAL:  No mass or collection is seen.  VISUALIZED SINUSES:  Clear.  VISUALIZED MASTOIDS: Left mastoid sclerosis  CALVARIUM: Infiltrative appearance tothe calvarium may be indicative of   marrow infiltration on the basis of patient's known diagnosis of breast   cancer. MISCELLANEOUS:  None.    IMPRESSION:  No significant interval change compared with 7/17/2023 in   left PCA infarct which is old. Microvascular ischemic changes involving   the periventricular and subcortical white matter as seen   previously.Questionable lesions at the level of the calvarium related to   possible breast CA. Clinical correlation recommended.    --- End of Report ---      ct< from: CT Head No Cont (09.26.23 @ 21:02) >  IMPRESSION: Vague questionable areas of hypoattenuation within the   bilateral cerebellar hemispheres which may be compatible with   acute/subacute ischemia. Recommend further evaluation with a brain MRI   study, provided there are no MRI contraindications.    No acute intracranial hemorrhage.    Previously seen questionable vague wedge-shaped area of hypoattenuation   in the left parietal lobe with artifactual secondary to motion and volume   averaging with a prominent sulcus.    Chronic left occipital lobe infarct.    < end of copied text >    < from: CT Head No Cont (10.03.23 @ 20:46) >    IMPRESSION:    No acute intracranial hemorrhage or mass effect. Evaluation of the   posterior fossa degraded by streak artifact from dental amalgam.   Lucencies in the bilateral cerebellum may be artifactual.    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    Bilateral middle ear and mastoid effusions which are also seen on prior   exam.    EEG Classification / Summary:  Abnormal EEG in the awake, drowsy states.   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   -No seizures

## 2023-11-07 NOTE — PROGRESS NOTE ADULT - SUBJECTIVE AND OBJECTIVE BOX
Subjective: Patient seen and examined. No new events except as noted.   remains in ICU     REVIEW OF SYSTEMS:    Unable to obtain     MEDICATIONS:  MEDICATIONS  (STANDING):  albuterol/ipratropium for Nebulization 3 milliLiter(s) Nebulizer every 6 hours  apixaban 5 milliGRAM(s) Oral every 12 hours  artificial tears (preservative free) Ophthalmic Solution 1 Drop(s) Both EYES every 4 hours  atorvastatin 10 milliGRAM(s) Oral at bedtime  calamine/zinc oxide Lotion 1 Application(s) Topical daily  chlorhexidine 0.12% Liquid 15 milliLiter(s) Oral Mucosa every 12 hours  chlorhexidine 2% Cloths 1 Application(s) Topical daily  dextrose 5%. 1000 milliLiter(s) (50 mL/Hr) IV Continuous <Continuous>  dextrose 5%. 1000 milliLiter(s) (100 mL/Hr) IV Continuous <Continuous>  dextrose 50% Injectable 25 Gram(s) IV Push once  dextrose 50% Injectable 12.5 Gram(s) IV Push once  dextrose 50% Injectable 25 Gram(s) IV Push once  dextrose 50% Injectable 25 Gram(s) IV Push once  droxidopa 600 milliGRAM(s) Oral every 8 hours  epoetin odalis (EPOGEN) Injectable 67464 Unit(s) IV Push <User Schedule>  ferrous    sulfate Liquid 300 milliGRAM(s) Oral daily  glucagon  Injectable 1 milliGRAM(s) IntraMuscular once  insulin lispro (ADMELOG) corrective regimen sliding scale   SubCutaneous every 6 hours  insulin NPH human recombinant 6 Unit(s) SubCutaneous every 6 hours  levETIRAcetam  Solution 1000 milliGRAM(s) Oral daily  levETIRAcetam  Solution 250 milliGRAM(s) Oral <User Schedule>  midodrine. 40 milliGRAM(s) Oral <User Schedule>  norepinephrine Infusion 0.05 MICROgram(s)/kG/Min (6.23 mL/Hr) IV Continuous <Continuous>  pantoprazole  Injectable 40 milliGRAM(s) IV Push two times a day  petrolatum Ophthalmic Ointment 1 Application(s) Both EYES four times a day  sodium chloride 1 Gram(s) Oral two times a day  sodium chloride 3%  Inhalation 4 milliLiter(s) Inhalation every 6 hours      PHYSICAL EXAM:  T(C): 37.9 (11-07-23 @ 16:00), Max: 37.9 (11-07-23 @ 16:00)  HR: 134 (11-07-23 @ 16:31) (111 - 134)  BP: --  RR: 24 (11-07-23 @ 16:00) (19 - 25)  SpO2: 100% (11-07-23 @ 16:31) (98% - 100%)  Wt(kg): --  I&O's Summary    06 Nov 2023 07:01  -  07 Nov 2023 07:00  --------------------------------------------------------  IN: 1869.7 mL / OUT: 2900 mL / NET: -1030.3 mL    07 Nov 2023 07:01  -  07 Nov 2023 17:27  --------------------------------------------------------  IN: 400 mL / OUT: 0 mL / NET: 400 mL              Appearance: NAD, +trach  +NGT  HEENT: dry oral mucosa  Lymphatic: No lymphadenopathy  Cardiovascular: Normal S1 S2, No JVD, No murmurs, No edema  Respiratory: Decreased BS, + trach to vent	  Neuro: opens eyes  Gastrointestinal: Soft, Non-tender, + BS, + NGT  Skin: No rashes, No ecchymoses, No cyanosis	  Extremities: No strength/ROM 2/2 sedation, + BL LE edema  Vascular: Peripheral pulses palpable 2+ bilaterally  Anasarca   Mode: AC/ CMV (Assist Control/ Continuous Mandatory Ventilation), RR (machine): 24, FiO2: 40, PEEP: 8, ITime: 0.8, MAP: 20, PC: 35, PIP: 43          LABS:    CARDIAC MARKERS:                                9.0    12.20 )-----------( 446      ( 07 Nov 2023 01:00 )             29.8     11-07    138  |  102  |  30<H>  ----------------------------<  152<H>  4.7   |  25  |  1.23    Ca    8.4      07 Nov 2023 01:00  Phos  4.6     11-07  Mg     2.40     11-07    TPro  6.5  /  Alb  2.0<L>  /  TBili  <0.2  /  DBili  x   /  AST  27  /  ALT  14  /  AlkPhos  334<H>  11-07    proBNP:   Lipid Profile:   HgA1c:   TSH:             TELEMETRY: 	SRST    ECG:  	  RADIOLOGY:   DIAGNOSTIC TESTING:  [ ] Echocardiogram:  [ ]  Catheterization:  [ ] Stress Test:    OTHER:

## 2023-11-07 NOTE — PROGRESS NOTE ADULT - SUBJECTIVE AND OBJECTIVE BOX
MICU Progress Note    INTERVAL HPI/OVERNIGHT EVENTS:    SUBJECTIVE: Patient seen and examined at bedside.     ROS: unable to assess    OBJECTIVE:    VITAL SIGNS:  ICU Vital Signs Last 24 Hrs  T(C): 37.7 (07 Nov 2023 04:00), Max: 37.7 (06 Nov 2023 20:00)  T(F): 99.8 (07 Nov 2023 04:00), Max: 99.8 (06 Nov 2023 20:00)  HR: 119 (07 Nov 2023 06:00) (111 - 136)  BP: --  BP(mean): --  ABP: 142/62 (07 Nov 2023 06:00) (59/26 - 169/68)  ABP(mean): 96 (07 Nov 2023 06:00) (38 - 110)  RR: 24 (07 Nov 2023 06:00) (11 - 31)  SpO2: 100% (07 Nov 2023 06:00) (91% - 100%)    O2 Parameters below as of 07 Nov 2023 06:00  Patient On (Oxygen Delivery Method): ventilator    O2 Concentration (%): 40      Mode: AC/ CMV (Assist Control/ Continuous Mandatory Ventilation), RR (machine): 24, FiO2: 40, PEEP: 8, ITime: 0.8, MAP: 20, PC: 35, PIP: 43    11-06 @ 07:01  -  11-07 @ 07:00  --------------------------------------------------------  IN: 1869.7 mL / OUT: 2900 mL / NET: -1030.3 mL      CAPILLARY BLOOD GLUCOSE      POCT Blood Glucose.: 124 mg/dL (07 Nov 2023 05:29)      PHYSICAL EXAM:  General: NAD  HEENT: NC/AT; PERRL, clear conjunctiva; L ear w/ grainy white residue, no bleeding appreciated  Neck: supple  Respiratory: CTA b/l  Cardiovascular: +S1/S2; RRR  Abdomen: soft, distended, warm to touch w/ area of erythema and induration RLQ  Extremities: WWP, 2+ peripheral pulses b/l; diffuse anasarca  Skin: normal color and turgor; no rash  Neurological: AO*0; rhythmic R facial and arm twitching      MEDICATIONS:  MEDICATIONS  (STANDING):  albuterol/ipratropium for Nebulization 3 milliLiter(s) Nebulizer every 6 hours  apixaban 5 milliGRAM(s) Oral every 12 hours  artificial tears (preservative free) Ophthalmic Solution 1 Drop(s) Both EYES every 4 hours  atorvastatin 10 milliGRAM(s) Oral at bedtime  calamine/zinc oxide Lotion 1 Application(s) Topical daily  chlorhexidine 0.12% Liquid 15 milliLiter(s) Oral Mucosa every 12 hours  chlorhexidine 2% Cloths 1 Application(s) Topical daily  dextrose 5%. 1000 milliLiter(s) (50 mL/Hr) IV Continuous <Continuous>  dextrose 5%. 1000 milliLiter(s) (100 mL/Hr) IV Continuous <Continuous>  dextrose 50% Injectable 25 Gram(s) IV Push once  dextrose 50% Injectable 12.5 Gram(s) IV Push once  dextrose 50% Injectable 25 Gram(s) IV Push once  dextrose 50% Injectable 25 Gram(s) IV Push once  droxidopa 600 milliGRAM(s) Oral every 8 hours  epoetin odalis (EPOGEN) Injectable 77729 Unit(s) IV Push <User Schedule>  ferrous    sulfate Liquid 300 milliGRAM(s) Oral daily  glucagon  Injectable 1 milliGRAM(s) IntraMuscular once  insulin lispro (ADMELOG) corrective regimen sliding scale   SubCutaneous every 6 hours  insulin NPH human recombinant 6 Unit(s) SubCutaneous every 6 hours  levETIRAcetam  Solution 1000 milliGRAM(s) Oral daily  levETIRAcetam  Solution 250 milliGRAM(s) Oral <User Schedule>  midodrine. 40 milliGRAM(s) Oral <User Schedule>  norepinephrine Infusion 0.05 MICROgram(s)/kG/Min (6.23 mL/Hr) IV Continuous <Continuous>  pantoprazole  Injectable 40 milliGRAM(s) IV Push two times a day  petrolatum Ophthalmic Ointment 1 Application(s) Both EYES four times a day  sodium chloride 1 Gram(s) Oral two times a day  sodium chloride 3%  Inhalation 4 milliLiter(s) Inhalation every 6 hours    MEDICATIONS  (PRN):  acetaminophen   Oral Liquid .. 650 milliGRAM(s) Oral every 6 hours PRN Temp greater or equal to 38C (100.4F), Mild Pain (1 - 3)  dextrose Oral Gel 15 Gram(s) Oral once PRN Blood Glucose LESS THAN 70 milliGRAM(s)/deciliter  diphenhydrAMINE Elixir 50 milliGRAM(s) Oral once PRN Rash and/or Itching  droxidopa 200 milliGRAM(s) Oral <User Schedule> PRN Prior to hemodialysis  midodrine. 20 milliGRAM(s) Oral <User Schedule> PRN 1 Hour Pre HD  sodium chloride 0.9% Bolus. 250 milliLiter(s) IV Bolus every 5 minutes PRN SBP LESS THAN or EQUAL to 90 mmHg  sodium chloride 0.9% lock flush 10 milliLiter(s) IV Push every 1 hour PRN Pre/post blood products, medications, blood draw, and to maintain line patency      ALLERGIES:  Allergies    isoniazid (Rash)  nafcillin (Unknown)  hydrALAZINE (Rash)  vitamin E (Short breath; Urticaria; Hives)  doxycycline (Rash)  cefepime (Rash)  NIFEdipine (Urticaria; Hives)  Zosyn (Rash)    Intolerances        LABS:                        9.0    12.20 )-----------( 446      ( 07 Nov 2023 01:00 )             29.8     11-07    138  |  102  |  30<H>  ----------------------------<  152<H>  4.7   |  25  |  1.23    Ca    8.4      07 Nov 2023 01:00  Phos  4.6     11-07  Mg     2.40     11-07    TPro  6.5  /  Alb  2.0<L>  /  TBili  <0.2  /  DBili  x   /  AST  27  /  ALT  14  /  AlkPhos  334<H>  11-07      Urinalysis Basic - ( 07 Nov 2023 01:00 )    Color: x / Appearance: x / SG: x / pH: x  Gluc: 152 mg/dL / Ketone: x  / Bili: x / Urobili: x   Blood: x / Protein: x / Nitrite: x   Leuk Esterase: x / RBC: x / WBC x   Sq Epi: x / Non Sq Epi: x / Bacteria: x        RADIOLOGY & ADDITIONAL TESTS: Reviewed.

## 2023-11-08 NOTE — PROGRESS NOTE ADULT - SUBJECTIVE AND OBJECTIVE BOX
NEPHROLOGY     Patient seen and examined with spouse at bedside, trach to vent, s/p HD this morning removed 2.6L, levo gtt resumed for HD, now off.     MEDICATIONS  (STANDING):  albuterol/ipratropium for Nebulization 3 milliLiter(s) Nebulizer every 6 hours  apixaban 5 milliGRAM(s) Oral every 12 hours  artificial tears (preservative free) Ophthalmic Solution 1 Drop(s) Both EYES every 4 hours  atorvastatin 10 milliGRAM(s) Oral at bedtime  calamine/zinc oxide Lotion 1 Application(s) Topical daily  chlorhexidine 0.12% Liquid 15 milliLiter(s) Oral Mucosa every 12 hours  chlorhexidine 2% Cloths 1 Application(s) Topical daily  dextrose 5%. 1000 milliLiter(s) (50 mL/Hr) IV Continuous <Continuous>  dextrose 5%. 1000 milliLiter(s) (100 mL/Hr) IV Continuous <Continuous>  dextrose 50% Injectable 12.5 Gram(s) IV Push once  dextrose 50% Injectable 25 Gram(s) IV Push once  dextrose 50% Injectable 25 Gram(s) IV Push once  dextrose 50% Injectable 25 Gram(s) IV Push once  droxidopa 600 milliGRAM(s) Oral every 8 hours  epoetin odalis (EPOGEN) Injectable 29977 Unit(s) IV Push <User Schedule>  ferrous    sulfate Liquid 300 milliGRAM(s) Oral daily  glucagon  Injectable 1 milliGRAM(s) IntraMuscular once  insulin lispro (ADMELOG) corrective regimen sliding scale   SubCutaneous every 6 hours  insulin NPH human recombinant 6 Unit(s) SubCutaneous every 6 hours  levETIRAcetam  Solution 1000 milliGRAM(s) Oral daily  levETIRAcetam  Solution 250 milliGRAM(s) Oral <User Schedule>  midodrine. 40 milliGRAM(s) Oral <User Schedule>  norepinephrine Infusion 0.05 MICROgram(s)/kG/Min (6.23 mL/Hr) IV Continuous <Continuous>  pantoprazole  Injectable 40 milliGRAM(s) IV Push two times a day  petrolatum Ophthalmic Ointment 1 Application(s) Both EYES four times a day  sodium chloride 1 Gram(s) Oral two times a day  sodium chloride 3%  Inhalation 4 milliLiter(s) Inhalation every 6 hours    VITALS:  T(C): , Max: 38.1 (11-07-23 @ 20:00)  T(F): , Max: 100.5 (11-07-23 @ 20:00)  HR: 129 (11-08-23 @ 11:11)  RR: 12 (11-08-23 @ 11:00)  SpO2: 100% (11-08-23 @ 11:11)    I and O's:    11-07 @ 07:01  -  11-08 @ 07:00  --------------------------------------------------------  IN: 1100 mL / OUT: 300 mL / NET: 800 mL    11-08 @ 07:01  -  11-08 @ 12:47  --------------------------------------------------------  IN: 672 mL / OUT: 3000 mL / NET: -2328 mL    PHYSICAL EXAM:  Constitutional: critically ill, trach to vent   HEENT: + NGT, + tracheostomy   Respiratory: coarse BS  Cardiovascular: tachy   Gastrointestinal: BS+, soft, NT/ND  Extremities: ++ generalized edema  Neurological: UTO  : No Wheeler  Skin: No rashes  Access: [facility]     LABS:                        8.7    12.34 )-----------( 512      ( 08 Nov 2023 05:15 )             29.2     11-08    136  |  100  |  41<H>  ----------------------------<  174<H>  5.4<H>   |  25  |  1.55<H>    Ca    8.7      08 Nov 2023 05:15  Phos  4.6     11-07  Mg     2.70     11-08    TPro  6.6  /  Alb  2.1<L>  /  TBili  <0.2  /  DBili  x   /  AST  29  /  ALT  15  /  AlkPhos  335<H>  11-08

## 2023-11-08 NOTE — PROGRESS NOTE ADULT - ATTENDING COMMENTS
Patient examined and case reviewed in detail on bedside rounds  Critically ill and unstable on vent/pressors with con'd need for pressors during HD   Frequent bedside visits with therapy change today.   I have personally and independently provided 35+ minutes of critical care services; this excludes time spent on separate procedures or teaching

## 2023-11-08 NOTE — PROGRESS NOTE ADULT - SUBJECTIVE AND OBJECTIVE BOX
Subjective: Patient seen and examined. No new events except as noted.   remains in ICU       REVIEW OF SYSTEMS:    Unable to obtain       MEDICATIONS:  MEDICATIONS  (STANDING):  albuterol/ipratropium for Nebulization 3 milliLiter(s) Nebulizer every 6 hours  apixaban 5 milliGRAM(s) Oral every 12 hours  artificial tears (preservative free) Ophthalmic Solution 1 Drop(s) Both EYES every 4 hours  atorvastatin 10 milliGRAM(s) Oral at bedtime  calamine/zinc oxide Lotion 1 Application(s) Topical daily  chlorhexidine 0.12% Liquid 15 milliLiter(s) Oral Mucosa every 12 hours  chlorhexidine 2% Cloths 1 Application(s) Topical daily  dextrose 5%. 1000 milliLiter(s) (100 mL/Hr) IV Continuous <Continuous>  dextrose 5%. 1000 milliLiter(s) (50 mL/Hr) IV Continuous <Continuous>  dextrose 50% Injectable 12.5 Gram(s) IV Push once  dextrose 50% Injectable 25 Gram(s) IV Push once  dextrose 50% Injectable 25 Gram(s) IV Push once  dextrose 50% Injectable 25 Gram(s) IV Push once  droxidopa 600 milliGRAM(s) Oral every 8 hours  epoetin odalis (EPOGEN) Injectable 48660 Unit(s) IV Push <User Schedule>  ferrous    sulfate Liquid 300 milliGRAM(s) Oral daily  glucagon  Injectable 1 milliGRAM(s) IntraMuscular once  insulin lispro (ADMELOG) corrective regimen sliding scale   SubCutaneous every 6 hours  insulin NPH human recombinant 6 Unit(s) SubCutaneous every 6 hours  levETIRAcetam  Solution 1000 milliGRAM(s) Oral daily  levETIRAcetam  Solution 250 milliGRAM(s) Oral <User Schedule>  midodrine. 40 milliGRAM(s) Oral <User Schedule>  norepinephrine Infusion 0.05 MICROgram(s)/kG/Min (6.23 mL/Hr) IV Continuous <Continuous>  pantoprazole  Injectable 40 milliGRAM(s) IV Push two times a day  petrolatum Ophthalmic Ointment 1 Application(s) Both EYES four times a day  sodium chloride 1 Gram(s) Oral two times a day  sodium chloride 3%  Inhalation 4 milliLiter(s) Inhalation every 6 hours      PHYSICAL EXAM:  T(C): 36.6 (11-08-23 @ 09:47), Max: 38.1 (11-07-23 @ 20:00)  HR: 128 (11-08-23 @ 10:51) (113 - 134)  BP: --  RR: 19 (11-08-23 @ 10:00) (8 - 32)  SpO2: 100% (11-08-23 @ 10:51) (96% - 100%)  Wt(kg): --  I&O's Summary    07 Nov 2023 07:01  -  08 Nov 2023 07:00  --------------------------------------------------------  IN: 1100 mL / OUT: 300 mL / NET: 800 mL    08 Nov 2023 07:01  -  08 Nov 2023 10:59  --------------------------------------------------------  IN: 400 mL / OUT: 3000 mL / NET: -2600 mL          Appearance: NAD, +trach  +NGT  HEENT: dry oral mucosa  Lymphatic: No lymphadenopathy  Cardiovascular: Normal S1 S2, No JVD, No murmurs, No edema  Respiratory: Decreased BS, + trach to vent	  Neuro: opens eyes  Gastrointestinal: Soft, Non-tender, + BS, + NGT  Skin: No rashes, No ecchymoses, No cyanosis	  Extremities: No strength/ROM 2/2 sedation, + BL LE edema  Vascular: Peripheral pulses palpable 2+ bilaterally  Anasarca   Mode: AC/ CMV (Assist Control/ Continuous Mandatory Ventilation), RR (machine): 24, FiO2: 40, PEEP: 8, ITime: 0.8, MAP: 19, PC: 35, PIP: 43      LABS:    CARDIAC MARKERS:                                8.7    12.34 )-----------( 512      ( 08 Nov 2023 05:15 )             29.2     11-08    136  |  100  |  41<H>  ----------------------------<  174<H>  5.4<H>   |  25  |  1.55<H>    Ca    8.7      08 Nov 2023 05:15  Phos  4.6     11-07  Mg     2.70     11-08    TPro  6.6  /  Alb  2.1<L>  /  TBili  <0.2  /  DBili  x   /  AST  29  /  ALT  15  /  AlkPhos  335<H>  11-08    proBNP:   Lipid Profile:   HgA1c:   TSH:             TELEMETRY: 	SR ST    ECG:  	  RADIOLOGY:   DIAGNOSTIC TESTING:  [ ] Echocardiogram:  [ ]  Catheterization:  [ ] Stress Test:    OTHER:

## 2023-11-08 NOTE — PROGRESS NOTE ADULT - ASSESSMENT
IMPRESSION: 73F w/ HTN, DM2, CVA, breast CA-bilateral mastectomy, recurrent aspiration pneumonia/respiratory failure, and CKD, 8/11/23 p/w acute hypercapnic respiratory failure; c/b RUSS    (1)Renal - RUSS on CKD4 ==>now newly ESRD. S/p HD this morning     (2)Hyponatremia - Na+ improving with HD    (3)CV- tenuous hemodynamics such with each passing week we have more difficulty performing HD/achieving UF, persistent issue. BP stable with [place], though continues to need pressors for HD    (4)ID- BCx from 10/14/23 NGTD, BC (10/17) NGTD - s/p IV abx     (5)Pulm- trach/vent-dependent    (6)Anemia- hgb < goal though stable, epo with HD    (7)Hyperkalemia - mild - should improve with HD    RECOMMEND:  (1)HD today (completed): pressors and sodium modelling as needed to keep SBP>90; Epogen with HD   (2)Dose new meds for GFR <10/HD    Nilda Espinoza, HEAVEN  [facility]  [phone]        IMPRESSION: 73F w/ HTN, DM2, CVA, breast CA-bilateral mastectomy, recurrent aspiration pneumonia/respiratory failure, and CKD, 8/11/23 p/w acute hypercapnic respiratory failure; c/b RUSS    (1)Renal - RUSS on CKD4 ==>now newly ESRD. S/p HD this morning     (2)Hyponatremia - Na+ improving with HD    (3)CV- tenuous hemodynamics such with each passing week we have more difficulty performing HD/achieving UF, persistent issue. BP stable with [place], though continues to need pressors for HD    (4)ID- BCx from 10/14/23 NGTD, BC (10/17) NGTD - s/p IV abx     (5)Pulm- trach/vent-dependent    (6)Anemia- hgb < goal though stable, epo with HD    (7)Hyperkalemia - mild - should improve with HD    RECOMMEND:  (1)HD today (completed): pressors and sodium modelling as needed to keep SBP>90; Epogen with HD   (2)Dose new meds for GFR <10/HD    Nilda Espinoza DNP  [facility]  [phone]       RENAL ATTENDING NOTE  Patient seen and examined with NP. Agree with assessment and plan as above.    Jimmy Colon MD  [facility]  [phone]

## 2023-11-08 NOTE — PROGRESS NOTE ADULT - ASSESSMENT
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       NEUROLOGY  #AMS  Multiple etiologies including active infection vs CVA.   MR head performed w/ new infarct and old infarcts, EEG without seizure events but with high foci potential   - f/u spot EEG given facial twitching: negative for epileptiform activity (10/31)  - deferring repeat MRI as unlikely to   - continue lipitor  - continue keppra given above  - aspirin held 10/11, likely in s/o GIB, Hgb stable not requiring pRBC since 10/14      CARDIOVASCULAR  # Septic vs vasoplegic shock   Etiology likely septic iso active infection. Possible component of vasoplegic, however no longer with active infection or on sedation. Off IV vasopressors.   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   - resolved    RESPIRATORY  # AHRF second to volume overload   - CKD vs HFpEF w/ hypercarbia 2/2 volume overload requiring intubation, trach, and HD initiation  - Continue on albuterol and chest PT  - Continue volume removal with HD      #tracheomalacia   - CT CHEST (9/8) with tracheomalacia, BL pleural effusions and continued consolidations     GI  # UGIB: last pRBC transfused 11/4 (10/14 before that)   - Continue on PPI BID    # Dysphagia   - NGT-TF     RENAL  # ESRD  - HD MWF TIW with midodrine and droxidopa prior  - ICU for vasopressor assisted volume removal, cap to 1 pressor and not offering CRRT due to comorbidities and prognosis   - f/u w/ nephrology: will continue to offer patient 1-pressor assisted HD as agreed prior, as long as she can tolerate HD maxed on 1 pressor, she will be considered iHD candidate  - UF -2.5L attempted 10/30, reached 1.7L limited by hypotension and tachycardia to 130s, able to remove 2L 11/1, 2L 11/3, 2L 11/6    INFECTIOUS DISEASE  #Sepsis: temp 101.2 on 11/2; sources include skin (poorly demarcated area of induration/erythema, enlarging on RLQ), pulm (copious sputum)  - Bcx, sputum cx sent  - repeat MRSA PCR neg  - ID re-consulted: abd edema likely 2/2 chronic panniculitis  - Abx:      --> Vanc (11/2) - MRSA neg, dc'd     --> Aztreo 2g (11/2) --> dc'd, 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 Coli sputum Cx, BAL w/ MSSA  - treated with abx   - eosinophilia workup: JORGE, ANCAs negative    # MAC   - outpatient treatment    HEME  # Anemia second to GIB vs renal disease   - multiple transfusions, last done 11/4  - management as per renal, PPI    #Allergic transfusion reaction: per RCU documentation, developed erythematous rash across chest shortly after starting transfusion, blood bank consulted and diagnosed mild allergic rxn  - premedicate w/ benadryl and famotidine prior to future transfusions    VASCULAR   # LLE POP DVT   - on Eliquis    # HD access  - TRISHA TAYLOR (9/27 - 10/21)  - TRISHA taylor replaced for HD     ONC   # Hx of Breast CA   - Patient dx in 2018 and s/p BL mastectomy (radical on right) and chemo and RT.     ENDOCRINE  # IDDM2   - Continued on NPH with ISS, however noted with hypoglycemic episodes and NPH dc'ed.    - Finger sticks trending up off NPH.   - Continue on NPH 6U with moderate ISS.   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       NEUROLOGY  #AMS  Multiple etiologies including active infection vs CVA.   MR head performed w/ new infarct and old infarcts, EEG without seizure events but with high foci potential   - f/u spot EEG given facial twitching: negative for epileptiform activity (10/31)  - deferring repeat MRI as unlikely to   - continue lipitor  - continue keppra given above  - aspirin held 10/11, likely in s/o GIB, Hgb stable not requiring pRBC since 10/14      CARDIOVASCULAR  # Septic vs vasoplegic shock   Etiology likely septic iso active infection. Possible component of vasoplegic, however no longer with active infection or on sedation. Off IV vasopressors.   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   - resolved    RESPIRATORY  # AHRF second to volume overload   - CKD vs HFpEF w/ hypercarbia 2/2 volume overload requiring intubation, trach, and HD initiation  - Continue on albuterol and chest PT  - Continue volume removal with HD      #tracheomalacia   - CT CHEST (9/8) with tracheomalacia, BL pleural effusions and continued consolidations     GI  # UGIB: last pRBC transfused 11/4 (10/14 before that)   - Continue on PPI BID    # Dysphagia   - NGT-TF     RENAL  # ESRD  - HD MWF TIW with midodrine and droxidopa prior  - ICU for vasopressor assisted volume removal, cap to 1 pressor and not offering CRRT due to comorbidities and prognosis   - f/u w/ nephrology: will continue to offer patient 1-pressor assisted HD as agreed prior, as long as she can tolerate HD maxed on 1 pressor, she will be considered iHD candidate  - UF -2.5L attempted 10/30, reached 1.7L limited by hypotension and tachycardia to 130s, able to remove 2L 11/1, 2L 11/3, 2L 11/6,     INFECTIOUS DISEASE  #Sepsis: temp 101.2 on 11/2; sources include skin (poorly demarcated area of induration/erythema, enlarging on RLQ), pulm (copious sputum)  - Bcx, sputum cx sent  - repeat MRSA PCR neg  - ID re-consulted: abd edema likely 2/2 chronic panniculitis  - Abx:      --> Vanc (11/2) - MRSA neg, dc'd     --> Aztreo 2g (11/2) --> dc'd, 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 Coli sputum Cx, BAL w/ MSSA  - treated with abx   - eosinophilia workup: JORGE, ANCAs negative    # MAC   - outpatient treatment    HEME  # Anemia second to GIB vs renal disease   - multiple transfusions, last done 11/4  - management as per renal, PPI    #Allergic transfusion reaction: per RCU documentation, developed erythematous rash across chest shortly after starting transfusion, blood bank consulted and diagnosed mild allergic rxn  - premedicate w/ benadryl and famotidine prior to future transfusions    VASCULAR   # LLE POP DVT   - on Eliquis    # HD access  - TRISHA TAYLOR (9/27 - 10/21)  - TRISHA taylor replaced for HD     ONC   # Hx of Breast CA   - Patient dx in 2018 and s/p BL mastectomy (radical on right) and chemo and RT.     ENDOCRINE  # IDDM2   - Continued on NPH with ISS, however noted with hypoglycemic episodes and NPH dc'ed.    - Finger sticks trending up off NPH.   - Continue on NPH 6U with moderate ISS.   - Adjust as need     SKIN  # Drug Eruption   - treated    ETHICS/ GOC    - Attempted palliative discussion however family not interested and wishes for FULL CODE  - Family continues to want everything done despite inability to tolerate HD on multiple oral pressor  - Levo capped at 0.3 during HD

## 2023-11-08 NOTE — PROGRESS NOTE ADULT - SUBJECTIVE AND OBJECTIVE BOX
MICU Progress Note    INTERVAL HPI/OVERNIGHT EVENTS:    SUBJECTIVE: Patient seen and examined at bedside.     ROS: unable to assess    OBJECTIVE:    VITAL SIGNS:  ICU Vital Signs Last 24 Hrs  T(C): 36.8 (08 Nov 2023 06:30), Max: 38.1 (07 Nov 2023 20:00)  T(F): 98.3 (08 Nov 2023 06:30), Max: 100.5 (07 Nov 2023 20:00)  HR: 123 (08 Nov 2023 06:30) (114 - 134)  BP: --  BP(mean): --  ABP: 142/71 (08 Nov 2023 06:30) (96/34 - 146/65)  ABP(mean): 101 (08 Nov 2023 06:30) (59 - 101)  RR: 32 (08 Nov 2023 06:30) (16 - 32)  SpO2: 100% (08 Nov 2023 06:30) (96% - 100%)    O2 Parameters below as of 08 Nov 2023 05:00  Patient On (Oxygen Delivery Method): ventilator    O2 Concentration (%): 40      Mode: AC/ CMV (Assist Control/ Continuous Mandatory Ventilation), RR (machine): 24, FiO2: 40, PEEP: 8, ITime: 0.8, MAP: 19, PC: 35, PIP: 43    11-06 @ 07:01 - 11-07 @ 07:00  --------------------------------------------------------  IN: 1869.7 mL / OUT: 2900 mL / NET: -1030.3 mL    11-07 @ 07:01 - 11-08 @ 06:59  --------------------------------------------------------  IN: 920 mL / OUT: 0 mL / NET: 920 mL      CAPILLARY BLOOD GLUCOSE      POCT Blood Glucose.: 213 mg/dL (07 Nov 2023 23:26)      PHYSICAL EXAM:  General: NAD  HEENT: NC/AT; PERRL, clear conjunctiva; L ear w/ grainy white residue, no bleeding appreciated  Neck: supple  Respiratory: CTA b/l  Cardiovascular: +S1/S2; RRR  Abdomen: soft, distended, warm to touch w/ area of erythema and induration RLQ  Extremities: WWP, 2+ peripheral pulses b/l; diffuse anasarca  Skin: normal color and turgor; no rash  Neurological: AO*0; rhythmic R facial and arm twitching      MEDICATIONS:  MEDICATIONS  (STANDING):  albuterol/ipratropium for Nebulization 3 milliLiter(s) Nebulizer every 6 hours  apixaban 5 milliGRAM(s) Oral every 12 hours  artificial tears (preservative free) Ophthalmic Solution 1 Drop(s) Both EYES every 4 hours  atorvastatin 10 milliGRAM(s) Oral at bedtime  calamine/zinc oxide Lotion 1 Application(s) Topical daily  chlorhexidine 0.12% Liquid 15 milliLiter(s) Oral Mucosa every 12 hours  chlorhexidine 2% Cloths 1 Application(s) Topical daily  dextrose 5%. 1000 milliLiter(s) (50 mL/Hr) IV Continuous <Continuous>  dextrose 5%. 1000 milliLiter(s) (100 mL/Hr) IV Continuous <Continuous>  dextrose 50% Injectable 12.5 Gram(s) IV Push once  dextrose 50% Injectable 25 Gram(s) IV Push once  dextrose 50% Injectable 25 Gram(s) IV Push once  dextrose 50% Injectable 25 Gram(s) IV Push once  droxidopa 600 milliGRAM(s) Oral every 8 hours  epoetin odalis (EPOGEN) Injectable 61513 Unit(s) IV Push <User Schedule>  ferrous    sulfate Liquid 300 milliGRAM(s) Oral daily  glucagon  Injectable 1 milliGRAM(s) IntraMuscular once  insulin lispro (ADMELOG) corrective regimen sliding scale   SubCutaneous every 6 hours  insulin NPH human recombinant 6 Unit(s) SubCutaneous every 6 hours  levETIRAcetam  Solution 1000 milliGRAM(s) Oral daily  levETIRAcetam  Solution 250 milliGRAM(s) Oral <User Schedule>  midodrine. 40 milliGRAM(s) Oral <User Schedule>  norepinephrine Infusion 0.05 MICROgram(s)/kG/Min (6.23 mL/Hr) IV Continuous <Continuous>  pantoprazole  Injectable 40 milliGRAM(s) IV Push two times a day  petrolatum Ophthalmic Ointment 1 Application(s) Both EYES four times a day  sodium chloride 1 Gram(s) Oral two times a day  sodium chloride 3%  Inhalation 4 milliLiter(s) Inhalation every 6 hours    MEDICATIONS  (PRN):  acetaminophen   Oral Liquid .. 650 milliGRAM(s) Oral every 6 hours PRN Temp greater or equal to 38C (100.4F), Mild Pain (1 - 3)  dextrose Oral Gel 15 Gram(s) Oral once PRN Blood Glucose LESS THAN 70 milliGRAM(s)/deciliter  diphenhydrAMINE Elixir 50 milliGRAM(s) Oral once PRN Rash and/or Itching  droxidopa 600 milliGRAM(s) Oral <User Schedule> PRN prior to hemodialysis  midodrine. 20 milliGRAM(s) Oral <User Schedule> PRN 1 Hour Pre HD  sodium chloride 0.9% Bolus. 250 milliLiter(s) IV Bolus every 5 minutes PRN SBP LESS THAN or EQUAL to 90 mmHg  sodium chloride 0.9% lock flush 10 milliLiter(s) IV Push every 1 hour PRN Pre/post blood products, medications, blood draw, and to maintain line patency      ALLERGIES:  Allergies    isoniazid (Rash)  nafcillin (Unknown)  hydrALAZINE (Rash)  vitamin E (Short breath; Urticaria; Hives)  doxycycline (Rash)  cefepime (Rash)  NIFEdipine (Urticaria; Hives)  Zosyn (Rash)    Intolerances        LABS:                        8.7    12.34 )-----------( 512      ( 08 Nov 2023 05:15 )             29.2     11-08    136  |  100  |  41<H>  ----------------------------<  174<H>  5.4<H>   |  25  |  1.55<H>    Ca    8.7      08 Nov 2023 05:15  Phos  4.6     11-07  Mg     2.70     11-08    TPro  6.6  /  Alb  2.1<L>  /  TBili  <0.2  /  DBili  x   /  AST  29  /  ALT  15  /  AlkPhos  335<H>  11-08    PTT - ( 08 Nov 2023 05:15 )  PTT:34.2 sec  Urinalysis Basic - ( 08 Nov 2023 05:15 )    Color: x / Appearance: x / SG: x / pH: x  Gluc: 174 mg/dL / Ketone: x  / Bili: x / Urobili: x   Blood: x / Protein: x / Nitrite: x   Leuk Esterase: x / RBC: x / WBC x   Sq Epi: x / Non Sq Epi: x / Bacteria: x        RADIOLOGY & ADDITIONAL TESTS: Reviewed. MICU Progress Note    INTERVAL HPI/OVERNIGHT EVENTS: did not receive 600mg droxidopa prior to HD. Required up to 0.1 levo gtt.     SUBJECTIVE: Patient seen and examined at bedside.     ROS: unable to assess    OBJECTIVE:    VITAL SIGNS:  ICU Vital Signs Last 24 Hrs  T(C): 36.8 (08 Nov 2023 06:30), Max: 38.1 (07 Nov 2023 20:00)  T(F): 98.3 (08 Nov 2023 06:30), Max: 100.5 (07 Nov 2023 20:00)  HR: 123 (08 Nov 2023 06:30) (114 - 134)  BP: --  BP(mean): --  ABP: 142/71 (08 Nov 2023 06:30) (96/34 - 146/65)  ABP(mean): 101 (08 Nov 2023 06:30) (59 - 101)  RR: 32 (08 Nov 2023 06:30) (16 - 32)  SpO2: 100% (08 Nov 2023 06:30) (96% - 100%)    O2 Parameters below as of 08 Nov 2023 05:00  Patient On (Oxygen Delivery Method): ventilator    O2 Concentration (%): 40      Mode: AC/ CMV (Assist Control/ Continuous Mandatory Ventilation), RR (machine): 24, FiO2: 40, PEEP: 8, ITime: 0.8, MAP: 19, PC: 35, PIP: 43    11-06 @ 07:01 - 11-07 @ 07:00  --------------------------------------------------------  IN: 1869.7 mL / OUT: 2900 mL / NET: -1030.3 mL    11-07 @ 07:01 - 11-08 @ 06:59  --------------------------------------------------------  IN: 920 mL / OUT: 0 mL / NET: 920 mL      CAPILLARY BLOOD GLUCOSE      POCT Blood Glucose.: 213 mg/dL (07 Nov 2023 23:26)      PHYSICAL EXAM:  General: NAD  HEENT: NC/AT; PERRL, clear conjunctiva; L ear w/ grainy white residue, no bleeding appreciated  Neck: supple  Respiratory: CTA b/l  Cardiovascular: +S1/S2; RRR  Abdomen: soft, distended, warm to touch w/ area of erythema and induration RLQ  Extremities: WWP, 2+ peripheral pulses b/l; diffuse anasarca  Skin: normal color and turgor; no rash  Neurological: AO*0; rhythmic R facial and arm twitching      MEDICATIONS:  MEDICATIONS  (STANDING):  albuterol/ipratropium for Nebulization 3 milliLiter(s) Nebulizer every 6 hours  apixaban 5 milliGRAM(s) Oral every 12 hours  artificial tears (preservative free) Ophthalmic Solution 1 Drop(s) Both EYES every 4 hours  atorvastatin 10 milliGRAM(s) Oral at bedtime  calamine/zinc oxide Lotion 1 Application(s) Topical daily  chlorhexidine 0.12% Liquid 15 milliLiter(s) Oral Mucosa every 12 hours  chlorhexidine 2% Cloths 1 Application(s) Topical daily  dextrose 5%. 1000 milliLiter(s) (50 mL/Hr) IV Continuous <Continuous>  dextrose 5%. 1000 milliLiter(s) (100 mL/Hr) IV Continuous <Continuous>  dextrose 50% Injectable 12.5 Gram(s) IV Push once  dextrose 50% Injectable 25 Gram(s) IV Push once  dextrose 50% Injectable 25 Gram(s) IV Push once  dextrose 50% Injectable 25 Gram(s) IV Push once  droxidopa 600 milliGRAM(s) Oral every 8 hours  epoetin odalis (EPOGEN) Injectable 78613 Unit(s) IV Push <User Schedule>  ferrous    sulfate Liquid 300 milliGRAM(s) Oral daily  glucagon  Injectable 1 milliGRAM(s) IntraMuscular once  insulin lispro (ADMELOG) corrective regimen sliding scale   SubCutaneous every 6 hours  insulin NPH human recombinant 6 Unit(s) SubCutaneous every 6 hours  levETIRAcetam  Solution 1000 milliGRAM(s) Oral daily  levETIRAcetam  Solution 250 milliGRAM(s) Oral <User Schedule>  midodrine. 40 milliGRAM(s) Oral <User Schedule>  norepinephrine Infusion 0.05 MICROgram(s)/kG/Min (6.23 mL/Hr) IV Continuous <Continuous>  pantoprazole  Injectable 40 milliGRAM(s) IV Push two times a day  petrolatum Ophthalmic Ointment 1 Application(s) Both EYES four times a day  sodium chloride 1 Gram(s) Oral two times a day  sodium chloride 3%  Inhalation 4 milliLiter(s) Inhalation every 6 hours    MEDICATIONS  (PRN):  acetaminophen   Oral Liquid .. 650 milliGRAM(s) Oral every 6 hours PRN Temp greater or equal to 38C (100.4F), Mild Pain (1 - 3)  dextrose Oral Gel 15 Gram(s) Oral once PRN Blood Glucose LESS THAN 70 milliGRAM(s)/deciliter  diphenhydrAMINE Elixir 50 milliGRAM(s) Oral once PRN Rash and/or Itching  droxidopa 600 milliGRAM(s) Oral <User Schedule> PRN prior to hemodialysis  midodrine. 20 milliGRAM(s) Oral <User Schedule> PRN 1 Hour Pre HD  sodium chloride 0.9% Bolus. 250 milliLiter(s) IV Bolus every 5 minutes PRN SBP LESS THAN or EQUAL to 90 mmHg  sodium chloride 0.9% lock flush 10 milliLiter(s) IV Push every 1 hour PRN Pre/post blood products, medications, blood draw, and to maintain line patency      ALLERGIES:  Allergies    isoniazid (Rash)  nafcillin (Unknown)  hydrALAZINE (Rash)  vitamin E (Short breath; Urticaria; Hives)  doxycycline (Rash)  cefepime (Rash)  NIFEdipine (Urticaria; Hives)  Zosyn (Rash)    Intolerances        LABS:                        8.7    12.34 )-----------( 512      ( 08 Nov 2023 05:15 )             29.2     11-08    136  |  100  |  41<H>  ----------------------------<  174<H>  5.4<H>   |  25  |  1.55<H>    Ca    8.7      08 Nov 2023 05:15  Phos  4.6     11-07  Mg     2.70     11-08    TPro  6.6  /  Alb  2.1<L>  /  TBili  <0.2  /  DBili  x   /  AST  29  /  ALT  15  /  AlkPhos  335<H>  11-08    PTT - ( 08 Nov 2023 05:15 )  PTT:34.2 sec  Urinalysis Basic - ( 08 Nov 2023 05:15 )    Color: x / Appearance: x / SG: x / pH: x  Gluc: 174 mg/dL / Ketone: x  / Bili: x / Urobili: x   Blood: x / Protein: x / Nitrite: x   Leuk Esterase: x / RBC: x / WBC x   Sq Epi: x / Non Sq Epi: x / Bacteria: x        RADIOLOGY & ADDITIONAL TESTS: Reviewed.

## 2023-11-08 NOTE — PROGRESS NOTE ADULT - ASSESSMENT
72 y/o F well known to me from my [facility]. she was admitted at [facility] 7/12-7/22 w aspiration PNA, was treated w CEFEPIME, developed an allergic rash,  dCHF, + MAC on AFB culture, had been progressively getting more and more lethargic and dyspneic at home since DC.   In  am of 8/11/23  ptn presented with respiratory distress w hypoxia and hypercarbia requiring intubation 2/2 volume overload +/- Asp PNA      Neuro   not responsive  Baseline MS AOx3, aphasic   - h/o CVA , on aspirin and statin . resumed w feeding tube, ASA resumed 8/26  - eeg  2/2 tremors, no sz focus, right facial twitching, on KEPPRA  - less responsive in the past few days  - MRI 8/17:  new R cerebellar infarct, old left PCA/Occipital infarct. probably embolic in nature, did not tolerate full AC in the past, STEPHANIE is neg , no shunts observed  - ptn is poorly reactive, rpt scan done, no further CVA seen.     Cardiac   cardiology following  CHFpEF   TTE 7/2023 with EF 59%, with severe LVH and diastolic dysfunction   off Levo drip , now only during HD  on midodrine 40 mg qid, droxidopa 600 tid, additional 200 mg prior to HD,     Pulmonary   Acute hypercapnic and hypoxemic respiratory failure   prolonged intubation, now  trache to  vent, has copious secretions      Renal   on HD via L IJ Shiley, tunneled catheter when clinically stable  HD MWF, midodrine and pressor assisted,   permacath when clinically stable      GI  NPO  on tube feeds  UGI bleed 8/31,  EGD/Colonoscopy: erosive gastropathy, esophagitis on colonoscopy old blood seen no active bleeding, poor prep  NGT-TF  s/p 2UPRBC 11/4    Endocrine  on Insulin: NPH, Admelog    ID   complicated infectious hospital course  treated for MSSA bacteremia, aspiration PNA.  sputum + for pseudomonas, ptn was initially on zosyn but was allergic, then was switched to aztreonam   Aztreonam was switched to polymyxin for a possible allergy but ptn didnt have a reaction to aztreonam, she had a reaction to Zosyn.   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 externa resolved    Heme/Onc  on eliquis for  LEFT POPLITEAL VEIN ACUTE DVT , on ELiquis    Ethics  GOC - Discussed GOC with daughter and , they have opted in the past for full code. and she remains full code at present

## 2023-11-08 NOTE — PROGRESS NOTE ADULT - ASSESSMENT
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 While in MICU patient noted with worsening renal failure requiring HD initiation, CGE/ Melena s/p EGD 8/31 found with esophagitis and erosive gastropathy, new right cerebellar infarct and fevers second to ESBL COLI and MSSA VAP, MSSA bacteremia, left ear otitis externa and drug rxn to cephalosporins Course further complicated by prolonged vent time s/p tracheostomy 9/1 and transferred to RCU 9/3 completed by recurrent fevers with high PIP, respiratory acidosis and concern for volume overloaded.   CTH repeated 9/26 for concern for encephalopathy - has known now - chronic bilateral cerebellar infarcts, L PCA infarct. L parietal hypodensity seen on prior CT likely artifactual  Repeat CTH 10/3 - unchanged  EEG 10/5 with L posterocentral/temporal spikes/sharp waves - doubt true focal seizure upon further review of EEG     Impression:   R/o new focal seizure - on Keppra  Suspect underlying movement d/o - PD?  Metabolic encephalopathy related to shock, infection, doubt new stroke  Multiple embolic strokes s/p ILR without events  Dementia   MSSA bacteremia, s/p Daptomycin  recurrent L otitis externa  Acute popliteal DVT on Eliquis   Anemia     Recommendations   [] care per MICU for persistent hypotension, sepsis  [] repeat infectious w/u  [] Abx per ID   [] has multiple areas of epileptogenic potential on EEG, no clear seizure correlate seen.  Repeat EEG for R facial twitching negative.   [] would stop Keppra to see if mental status improves, doubt true seizures  [] consider MRI brain once stable but doubt will change mgmt  [] mgmt of hypotension per primary team, remains full code  [] Keppra 500mg BID with extra 250mg after HD on HD days  [] Abx per ID  [] C/w home Atorvastatin 10 mg qhs   [] continue to address GOC with family, poor prognosis    Katty Charles DO  Vascular Neurology  Office [phone]

## 2023-11-08 NOTE — PROGRESS NOTE ADULT - SUBJECTIVE AND OBJECTIVE BOX
S:  Pt seen and examined.       Medications: MEDICATIONS  (STANDING):  albuterol/ipratropium for Nebulization 3 milliLiter(s) Nebulizer every 6 hours  apixaban 5 milliGRAM(s) Oral every 12 hours  artificial tears (preservative free) Ophthalmic Solution 1 Drop(s) Both EYES every 4 hours  atorvastatin 10 milliGRAM(s) Oral at bedtime  calamine/zinc oxide Lotion 1 Application(s) Topical daily  chlorhexidine 0.12% Liquid 15 milliLiter(s) Oral Mucosa every 12 hours  chlorhexidine 2% Cloths 1 Application(s) Topical daily  dextrose 5%. 1000 milliLiter(s) (100 mL/Hr) IV Continuous <Continuous>  dextrose 5%. 1000 milliLiter(s) (50 mL/Hr) IV Continuous <Continuous>  dextrose 50% Injectable 12.5 Gram(s) IV Push once  dextrose 50% Injectable 25 Gram(s) IV Push once  dextrose 50% Injectable 25 Gram(s) IV Push once  dextrose 50% Injectable 25 Gram(s) IV Push once  droxidopa 600 milliGRAM(s) Oral every 8 hours  epoetin odalis (EPOGEN) Injectable 76015 Unit(s) IV Push <User Schedule>  ferrous    sulfate Liquid 300 milliGRAM(s) Oral daily  glucagon  Injectable 1 milliGRAM(s) IntraMuscular once  insulin lispro (ADMELOG) corrective regimen sliding scale   SubCutaneous every 6 hours  insulin NPH human recombinant 6 Unit(s) SubCutaneous every 6 hours  levETIRAcetam  Solution 1000 milliGRAM(s) Oral daily  levETIRAcetam  Solution 250 milliGRAM(s) Oral <User Schedule>  midodrine. 40 milliGRAM(s) Oral <User Schedule>  norepinephrine Infusion 0.05 MICROgram(s)/kG/Min (6.23 mL/Hr) IV Continuous <Continuous>  pantoprazole  Injectable 40 milliGRAM(s) IV Push two times a day  petrolatum Ophthalmic Ointment 1 Application(s) Both EYES four times a day  sodium chloride 1 Gram(s) Oral two times a day  sodium chloride 3%  Inhalation 4 milliLiter(s) Inhalation every 6 hours    MEDICATIONS  (PRN):  acetaminophen   Oral Liquid .. 650 milliGRAM(s) Oral every 6 hours PRN Temp greater or equal to 38C (100.4F), Mild Pain (1 - 3)  dextrose Oral Gel 15 Gram(s) Oral once PRN Blood Glucose LESS THAN 70 milliGRAM(s)/deciliter  diphenhydrAMINE Elixir 50 milliGRAM(s) Oral once PRN Rash and/or Itching  droxidopa 600 milliGRAM(s) Oral <User Schedule> PRN prior to hemodialysis  midodrine. 20 milliGRAM(s) Oral <User Schedule> PRN 1 Hour Pre HD  sodium chloride 0.9% Bolus. 250 milliLiter(s) IV Bolus every 5 minutes PRN SBP LESS THAN or EQUAL to 90 mmHg  sodium chloride 0.9% lock flush 10 milliLiter(s) IV Push every 1 hour PRN Pre/post blood products, medications, blood draw, and to maintain line patency       Vitals:  Vital Signs Last 24 Hrs  T(C): 36.9 (08 Nov 2023 16:00), Max: 38.1 (07 Nov 2023 20:00)  T(F): 98.5 (08 Nov 2023 16:00), Max: 100.5 (07 Nov 2023 20:00)  HR: 110 (08 Nov 2023 16:00) (110 - 133)  BP: --  BP(mean): --  RR: 24 (08 Nov 2023 16:00) (8 - 32)  SpO2: 100% (08 Nov 2023 16:00) (96% - 100%)    Parameters below as of 08 Nov 2023 16:00  Patient On (Oxygen Delivery Method): ventilator    O2 Concentration (%): 40          LABS:                          8.7    12.34 )-----------( 512      ( 08 Nov 2023 05:15 )             29.2     11-08    136  |  100  |  41<H>  ----------------------------<  174<H>  5.4<H>   |  25  |  1.55<H>    Ca    8.7      08 Nov 2023 05:15  Phos  4.6     11-07  Mg     2.70     11-08    TPro  6.6  /  Alb  2.1<L>  /  TBili  <0.2  /  DBili  x   /  AST  29  /  ALT  15  /  AlkPhos  335<H>  11-08    LIVER FUNCTIONS - ( 08 Nov 2023 05:15 )  Alb: 2.1 g/dL / Pro: 6.6 g/dL / ALK PHOS: 335 U/L / ALT: 15 U/L / AST: 29 U/L / GGT: x           PTT - ( 08 Nov 2023 05:15 )  PTT:34.2 sec  Urinalysis Basic - ( 08 Nov 2023 05:15 )    Color: x / Appearance: x / SG: x / pH: x  Gluc: 174 mg/dL / Ketone: x  / Bili: x / Urobili: x   Blood: x / Protein: x / Nitrite: x   Leuk Esterase: x / RBC: x / WBC x   Sq Epi: x / Non Sq Epi: x / Bacteria: x        Neurological Exam:  Mental Status: Eyes closed, minimal spont eye opening off sedation. Does not follow commands,  + trach to vent and NGT   Cranial Nerves: PERRL slightly sluggish, R facial twitching noted by mouth lessened today- ? suppressible. occasional head dystonia  Motor: Moves upper extremities spontaneously R >L, tremor R > L  no movement noted in lowers  Sensation: WD to noxious x4    I personally reviewed the below data/images/labs:        < from: CT Head No Cont (08.15.23 @ 17:20) >    ACC: [id number] EXAM:  CT BRAIN   ORDERED BY: MINAL SAM     PROCEDURE DATE:  08/15/2023          INTERPRETATION:  Clinical indication: Change in neuro exam.    Multiple axial sections were performed from base to vertex without   contrast enhancement. Coronal and sagittal reconstructions were   reformatted well.    This exam is compared prior head CT performed on August 11, 2023    Parenchymal volume loss and chronic microvessel ischemic changes are   again seen.    Abnormal low-attenuation involving the left occipital cortical   subcortical region is again seen. 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    No evidence of acute hemorrhage mass or mass effect is seen.    Evaluation of the osseous structures with appropriate window demonstrates   sclerotic changes about the left mastoid region which appears stable.   Opacification left middle ear region is again seen.    Patient is status post bilateral cataract surgery.    Impression: Stable exam.    < end of copied text >    vEEG:    Clinical Impression:  - Severe diffuse non-specific cerebral dysfunction  - There were no epileptiform abnormalities or seizures recorded.        CTH 8/11:    VENTRICLES AND SULCI: Age-appropriate involutional change  INTRA-AXIAL:  Old left PCA infarct as seen on the prior unchanged.   Microvascular ischemic changes involving the periventricular and   subcortical white matter as seen previously  EXTRA-AXIAL:  No mass or collection is seen.  VISUALIZED SINUSES:  Clear.  VISUALIZED MASTOIDS: Left mastoid sclerosis  CALVARIUM: Infiltrative appearance tothe calvarium may be indicative of   marrow infiltration on the basis of patient's known diagnosis of breast   cancer. MISCELLANEOUS:  None.    IMPRESSION:  No significant interval change compared with 7/17/2023 in   left PCA infarct which is old. Microvascular ischemic changes involving   the periventricular and subcortical white matter as seen   previously.Questionable lesions at the level of the calvarium related to   possible breast CA. Clinical correlation recommended.    --- End of Report ---      ct< from: CT Head No Cont (09.26.23 @ 21:02) >  IMPRESSION: Vague questionable areas of hypoattenuation within the   bilateral cerebellar hemispheres which may be compatible with   acute/subacute ischemia. Recommend further evaluation with a brain MRI   study, provided there are no MRI contraindications.    No acute intracranial hemorrhage.    Previously seen questionable vague wedge-shaped area of hypoattenuation   in the left parietal lobe with artifactual secondary to motion and volume   averaging with a prominent sulcus.    Chronic left occipital lobe infarct.    < end of copied text >    < from: CT Head No Cont (10.03.23 @ 20:46) >    IMPRESSION:    No acute intracranial hemorrhage or mass effect. Evaluation of the   posterior fossa degraded by streak artifact from dental amalgam.   Lucencies in the bilateral cerebellum may be artifactual.    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    Bilateral middle ear and mastoid effusions which are also seen on prior   exam.    EEG Classification / Summary:  Abnormal EEG in the awake, drowsy states.   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   -No seizures

## 2023-11-08 NOTE — PROGRESS NOTE ADULT - SUBJECTIVE AND OBJECTIVE BOX
Patient is a 73y old  Female who presents with a chief complaint of Respiratory distress (08 Nov 2023 17:14)      SUBJECTIVE / OVERNIGHT EVENTS: ptn remains in MICU, trache to vent, not responsive, on pressor assisted HD    MEDICATIONS  (STANDING):  albuterol/ipratropium for Nebulization 3 milliLiter(s) Nebulizer every 6 hours  apixaban 5 milliGRAM(s) Oral every 12 hours  artificial tears (preservative free) Ophthalmic Solution 1 Drop(s) Both EYES every 4 hours  atorvastatin 10 milliGRAM(s) Oral at bedtime  calamine/zinc oxide Lotion 1 Application(s) Topical daily  chlorhexidine 0.12% Liquid 15 milliLiter(s) Oral Mucosa every 12 hours  chlorhexidine 2% Cloths 1 Application(s) Topical daily  dextrose 5%. 1000 milliLiter(s) (50 mL/Hr) IV Continuous <Continuous>  dextrose 5%. 1000 milliLiter(s) (100 mL/Hr) IV Continuous <Continuous>  dextrose 50% Injectable 12.5 Gram(s) IV Push once  dextrose 50% Injectable 25 Gram(s) IV Push once  dextrose 50% Injectable 25 Gram(s) IV Push once  dextrose 50% Injectable 25 Gram(s) IV Push once  droxidopa 600 milliGRAM(s) Oral every 8 hours  epoetin odalis (EPOGEN) Injectable 58557 Unit(s) IV Push <User Schedule>  ferrous    sulfate Liquid 300 milliGRAM(s) Oral daily  glucagon  Injectable 1 milliGRAM(s) IntraMuscular once  insulin lispro (ADMELOG) corrective regimen sliding scale   SubCutaneous every 6 hours  insulin NPH human recombinant 6 Unit(s) SubCutaneous every 6 hours  levETIRAcetam  Solution 1000 milliGRAM(s) Oral daily  levETIRAcetam  Solution 250 milliGRAM(s) Oral <User Schedule>  midodrine. 40 milliGRAM(s) Oral <User Schedule>  norepinephrine Infusion 0.05 MICROgram(s)/kG/Min (6.23 mL/Hr) IV Continuous <Continuous>  pantoprazole  Injectable 40 milliGRAM(s) IV Push two times a day  petrolatum Ophthalmic Ointment 1 Application(s) Both EYES four times a day  sodium chloride 1 Gram(s) Oral two times a day  sodium chloride 3%  Inhalation 4 milliLiter(s) Inhalation every 6 hours    MEDICATIONS  (PRN):  acetaminophen   Oral Liquid .. 650 milliGRAM(s) Oral every 6 hours PRN Temp greater or equal to 38C (100.4F), Mild Pain (1 - 3)  dextrose Oral Gel 15 Gram(s) Oral once PRN Blood Glucose LESS THAN 70 milliGRAM(s)/deciliter  diphenhydrAMINE Elixir 50 milliGRAM(s) Oral once PRN Rash and/or Itching  droxidopa 600 milliGRAM(s) Oral <User Schedule> PRN prior to hemodialysis  midodrine. 20 milliGRAM(s) Oral <User Schedule> PRN 1 Hour Pre HD  sodium chloride 0.9% Bolus. 250 milliLiter(s) IV Bolus every 5 minutes PRN SBP LESS THAN or EQUAL to 90 mmHg  sodium chloride 0.9% lock flush 10 milliLiter(s) IV Push every 1 hour PRN Pre/post blood products, medications, blood draw, and to maintain line patency      Vital Signs Last 24 Hrs  T(F): 98.4 (11-08-23 @ 20:00), Max: 98.5 (11-08-23 @ 16:00)  HR: 113 (11-08-23 @ 21:00) (109 - 129)  BP: --  RR: 24 (11-08-23 @ 21:00) (8 - 32)  SpO2: 95% (11-08-23 @ 21:00) (95% - 100%)  Telemetry:   CAPILLARY BLOOD GLUCOSE      POCT Blood Glucose.: 201 mg/dL (08 Nov 2023 17:26)  POCT Blood Glucose.: 207 mg/dL (08 Nov 2023 13:00)  POCT Blood Glucose.: 213 mg/dL (07 Nov 2023 23:26)    I&O's Summary    07 Nov 2023 07:01  -  08 Nov 2023 07:00  --------------------------------------------------------  IN: 1100 mL / OUT: 300 mL / NET: 800 mL    08 Nov 2023 07:01  -  08 Nov 2023 21:28  --------------------------------------------------------  IN: 822 mL / OUT: 3000 mL / NET: -2178 mL        PHYSICAL EXAM:  GENERAL: NAD, well-developed  HEAD:  Atraumatic, Normocephalic  EYES: EOMI, PERRLA, conjunctiva and sclera clear  NECK: Supple, No JVD  CHEST/LUNG: Clear to auscultation bilaterally; No wheeze  HEART: Regular rate and rhythm; No murmurs, rubs, or gallops  ABDOMEN: Soft, Nontender, Nondistended; Bowel sounds present  EXTREMITIES:  2+ Peripheral Pulses, No clubbing, cyanosis, or edema  PSYCH: AAOx3  NEUROLOGY: non-focal  SKIN: No rashes or lesions    LABS:                        8.7    12.34 )-----------( 512      ( 08 Nov 2023 05:15 )             29.2     11-08    136  |  100  |  41<H>  ----------------------------<  174<H>  5.4<H>   |  25  |  1.55<H>    Ca    8.7      08 Nov 2023 05:15  Phos  4.6     11-07  Mg     2.70     11-08    TPro  6.6  /  Alb  2.1<L>  /  TBili  <0.2  /  DBili  x   /  AST  29  /  ALT  15  /  AlkPhos  335<H>  11-08    PTT - ( 08 Nov 2023 05:15 )  PTT:34.2 sec      Urinalysis Basic - ( 08 Nov 2023 05:15 )    Color: x / Appearance: x / SG: x / pH: x  Gluc: 174 mg/dL / Ketone: x  / Bili: x / Urobili: x   Blood: x / Protein: x / Nitrite: x   Leuk Esterase: x / RBC: x / WBC x   Sq Epi: x / Non Sq Epi: x / Bacteria: x        RADIOLOGY & ADDITIONAL TESTS:    Imaging Personally Reviewed:    Consultant(s) Notes Reviewed:      Care Discussed with Consultants/Other Providers:

## 2023-11-08 NOTE — PROGRESS NOTE ADULT - ASSESSMENT
73 f with DM, HTN, CKD, breast CA, latent TB (treated first with INH then had rash and switched to rifampin), h/o MSSA bacteremia, c-diff, respiratory arrest and cardiac arrest (2018), s/p trach/PEG then removed, CVA with residual weakness, aspiration PNA, 1/4 sputums had MAC 7/2023, admitted 8/11 with respiratory failure no fever or WBC but was intubated and then spiked  blood cx negative, sputum cx with MSSA  developed rash and renal failure after cefepime, was on HD then discontinued became uremic and now again on HD  head MRI 8/17 with acute cerebellar infarct  had renal failure, AIN? family declined renal biopsy started on prednisone  s/p trach 9/1 and BAL with MSSA   ET cx with ESBL and MSSA  blood cx 9/1: MSSA with hypotension  repeat negative 9/4, 9/8, TTE no veg s/p 4 weeks of vanco/dapto through 10/2  L ear edema and otitis externa, s/p a long course of ana cristina, the last cx showed candida and ENT stated it could be fungal (saw black spores?) so was also given 7 days of fluconazole  still with intermittent fevers and then sputum cx with carbapenem resistant pseudomonas, s/p a course of polymixin  no improvement in mental status and ?seizure as well, neuro following  had drop in Hgb s/p transfusion and then again febrile 10/15-10/17 with leukocytosis, rash and eosinophilia, sputum growing pseudomonas and proteus  (both sensitive to aztreonam) ?VAP vs a transfusion reaction  hypotension during HD,  transferred to MICU, now off pressors, shiley was removed in view of intermittent fever, now has new shiley left IJ, c-diff and GI PCR negative  pt again febrile to 104.1 on 11/2, WBC 13 and the L ear again with otitis externa-    afebrile now after completing course aztreonam 11/6  blood cultures 11/2 no growth   CXR effusion   ENT evaluated left ear- improved     Monitoring off antibiotics for now   Colonized with  pseudo in sputum   would continue contact precautions     will d/c f/u for now   please call with questions or change in clinical status

## 2023-11-08 NOTE — PROGRESS NOTE ADULT - SUBJECTIVE AND OBJECTIVE BOX
Follow Up:  MSSA bacteremia, otitis external, VAP, fever     Interval History:    afebrile  ENT eval left ear noted- improved      daughter at bedside     ROS:  unable to obtain because: trached, AMS      Allergies  isoniazid (Rash)  nafcillin (Unknown)  hydrALAZINE (Rash)  vitamin E (Short breath; Urticaria; Hives)  doxycycline (Rash)  cefepime (Rash)  NIFEdipine (Urticaria; Hives)  Zosyn (Rash)        ANTIMICROBIALS:  aztreonam completed 11/6      OTHER MEDS:  acetaminophen   Oral Liquid .. 650 milliGRAM(s) Oral every 6 hours PRN  albuterol    0.083% 2.5 milliGRAM(s) Nebulizer every 6 hours  apixaban 5 milliGRAM(s) Oral every 12 hours  artificial tears (preservative free) Ophthalmic Solution 1 Drop(s) Both EYES every 4 hours  atorvastatin 10 milliGRAM(s) Oral at bedtime  chlorhexidine 0.12% Liquid 15 milliLiter(s) Oral Mucosa every 12 hours  chlorhexidine 2% Cloths 1 Application(s) Topical daily  ciprofloxacin/hydrocortisone Suspension Otic 4 Drop(s) Left Ear two times a day  dextrose 5%. 1000 milliLiter(s) IV Continuous <Continuous>  dextrose 5%. 1000 milliLiter(s) IV Continuous <Continuous>  dextrose 50% Injectable 12.5 Gram(s) IV Push once  dextrose 50% Injectable 25 Gram(s) IV Push once  dextrose 50% Injectable 25 Gram(s) IV Push once  dextrose 50% Injectable 25 Gram(s) IV Push once  dextrose Oral Gel 15 Gram(s) Oral once PRN  diphenhydrAMINE Elixir 50 milliGRAM(s) Oral once PRN  droxidopa 600 milliGRAM(s) Oral every 8 hours  droxidopa 200 milliGRAM(s) Oral <User Schedule> PRN  epoetin odalis (EPOGEN) Injectable 23391 Unit(s) IV Push <User Schedule>  ferrous    sulfate Liquid 300 milliGRAM(s) Oral daily  glucagon  Injectable 1 milliGRAM(s) IntraMuscular once  insulin lispro (ADMELOG) corrective regimen sliding scale   SubCutaneous every 6 hours  insulin NPH human recombinant 6 Unit(s) SubCutaneous every 6 hours  levETIRAcetam  Solution 1000 milliGRAM(s) Oral daily  levETIRAcetam  Solution 250 milliGRAM(s) Oral <User Schedule>  midodrine. 20 milliGRAM(s) Oral <User Schedule> PRN  midodrine. 40 milliGRAM(s) Oral <User Schedule>  norepinephrine Infusion 0.05 MICROgram(s)/kG/Min IV Continuous <Continuous>  pantoprazole  Injectable 40 milliGRAM(s) IV Push two times a day  petrolatum Ophthalmic Ointment 1 Application(s) Both EYES four times a day  sodium chloride 1 Gram(s) Oral two times a day  sodium chloride 0.9% Bolus. 250 milliLiter(s) IV Bolus every 5 minutes PRN  sodium chloride 0.9% lock flush 10 milliLiter(s) IV Push every 1 hour PRN      Vital Signs Last 24 Hrs  T(F): 98.5 (11-08-23 @ 16:00), Max: 100.5 (11-07-23 @ 20:00)  HR: 110 (11-08-23 @ 16:00)  BP: --  RR: 24 (11-08-23 @ 16:00)  SpO2: 100% (11-08-23 @ 16:00) (96% - 100%)      Physical Exam:  General:    NAD,  non toxic  Head: left IJ  HD cath  NGT  ENT:   left ear small eschar dry no drainage   Cardio:  s1s2  Respiratory:  BS bilat  abd:     soft,   scars, pannus   :     no  willard  Skin:    no rash                            8.7    12.34 )-----------( 512      ( 08 Nov 2023 05:15 )             29.2 11-08    136  |  100  |  41  ----------------------------<  174  5.4   |  25  |  1.55  Ca    8.7      08 Nov 2023 05:15Phos  4.6     11-07Mg     2.70     11-08  TPro  6.6  /  Alb  2.1  /  TBili  <0.2  /  DBili  x   /  AST  29  /  ALT  15  /  AlkPhos  335  11-08      MICROBIOLOGY:    culCulture - Blood (11.02.23 @ 09:10)   Specimen Source: .Blood Blood-Peripheral  Culture Results:   No growth at 5 daysCulture - Blood (11.02.23 @ 09:00)   Specimen Source: .Blood Blood-Peripheral  Culture Results:   No growth at 5 days    ET Tube ET Tube  11-02-23 --  --    Moderate polymorphonuclear leukocytes per low power field  No Squamous epithelial cells per low power field  Moderate Gram Negative Rods per oil power field      .Blood Blood-Peripheral  11-02-23   No growth at 24 hours  --  --      .Blood Blood-Peripheral  11-02-23   No growth at 24 hours  --  --      .Blood  10-29-23   No Blood Parasites observed by giemsa stain  One negative set of blood smears does not rule out  the possibility of a parasitic infection.  A minimum of 3  specimens should be collected, at least 12-24 hours apart,  over a 36 hour time period.  ************************************************************  NEGATIVE for Plasmodium antigens. Microscopy is performed for  confirmation.  The Malaria Rapid antigen test does not detect the  presence of Babesia species. If Babesiosis is suspected  please order test Babesia PCR: Babesia microti PCR Bld  ************************************************************  --  --      .Blood Blood-Venous  10-17-23   No growth at 5 days  --  --      .Blood Blood-Peripheral  10-14-23   No growth at 5 days  --  --      Trach Asp Tracheal Aspirate  10-14-23   Numerous Pseudomonas aeruginosa (Carbapenem Resistant)  Numerous Proteus mirabilis  Normal Respiratory Cate present  --  Pseudomonas aeruginosa (Carbapenem Resistant)  Proteus mirabilis      .Blood Blood-Venous  10-09-23   No growth at 5 days  --  --            < from: TTE W or WO Ultrasound Enhancing Agent (10.01.23 @ 10:07) >  CONCLUSIONS:      1. Left ventricular systolic function is normal with an ejection fraction visually estimated at 60 to 65 %.   2. The right ventricle is not well visualized.   3. Although there is no evidence of endocarditis on this study, the valves are not visualized well. Suggest STEPHANIE if clinically appropriate.    < end of copied text >

## 2023-11-09 NOTE — PROGRESS NOTE ADULT - SUBJECTIVE AND OBJECTIVE BOX
[facility] [phone]        Patient seen and examined she had yesterday 2.6L removed. Levophed resumed for HD then titrated off.         MEDICATIONS  (STANDING):  albuterol/ipratropium for Nebulization 3 milliLiter(s) Nebulizer every 6 hours  apixaban 5 milliGRAM(s) Oral every 12 hours  artificial tears (preservative free) Ophthalmic Solution 1 Drop(s) Both EYES every 4 hours  atorvastatin 10 milliGRAM(s) Oral at bedtime  calamine/zinc oxide Lotion 1 Application(s) Topical daily  chlorhexidine 0.12% Liquid 15 milliLiter(s) Oral Mucosa every 12 hours  chlorhexidine 2% Cloths 1 Application(s) Topical daily  dextrose 5%. 1000 milliLiter(s) (100 mL/Hr) IV Continuous <Continuous>  dextrose 5%. 1000 milliLiter(s) (50 mL/Hr) IV Continuous <Continuous>  dextrose 50% Injectable 25 Gram(s) IV Push once  dextrose 50% Injectable 25 Gram(s) IV Push once  dextrose 50% Injectable 25 Gram(s) IV Push once  dextrose 50% Injectable 12.5 Gram(s) IV Push once  droxidopa 600 milliGRAM(s) Oral every 8 hours  epoetin odalis (EPOGEN) Injectable 82084 Unit(s) IV Push <User Schedule>  ferrous    sulfate Liquid 300 milliGRAM(s) Oral daily  glucagon  Injectable 1 milliGRAM(s) IntraMuscular once  insulin lispro (ADMELOG) corrective regimen sliding scale   SubCutaneous every 6 hours  insulin NPH human recombinant 6 Unit(s) SubCutaneous every 6 hours  levETIRAcetam  Solution 1000 milliGRAM(s) Oral daily  levETIRAcetam  Solution 250 milliGRAM(s) Oral <User Schedule>  midodrine. 40 milliGRAM(s) Oral <User Schedule>  norepinephrine Infusion 0.05 MICROgram(s)/kG/Min (6.23 mL/Hr) IV Continuous <Continuous>  pantoprazole  Injectable 40 milliGRAM(s) IV Push two times a day  petrolatum Ophthalmic Ointment 1 Application(s) Both EYES four times a day  sodium chloride 1 Gram(s) Oral two times a day  sodium chloride 3%  Inhalation 4 milliLiter(s) Inhalation every 6 hours      VITAL:  T(C): , Max: 37.3 (11-09-23 @ 08:00)  T(F): , Max: 99.1 (11-09-23 @ 08:00)  HR: 116 (11-09-23 @ 15:07)  BP: --  BP(mean): --  RR: 0 (11-09-23 @ 14:00)  SpO2: 100% (11-09-23 @ 15:07)  Wt(kg): --    I and O's:    11-08 @ 07:01  -  11-09 @ 07:00  --------------------------------------------------------  IN: 1262 mL / OUT: 3200 mL / NET: -1938 mL          PHYSICAL EXAM:  Constitutional: critically ill, trach to vent   HEENT: + NGT, + tracheostomy   Respiratory: coarse BS  Cardiovascular: tachy   Gastrointestinal: BS+, soft, NT/ND  Extremities: ++ generalized edema  Neurological: UTO  : No Wheeler  Skin: No rashes  Access: [facility]     LABS:                        9.0    14.04 )-----------( 473      ( 09 Nov 2023 00:31 )             29.5     11-09    136  |  100  |  27<H>  ----------------------------<  165<H>  4.5   |  27  |  1.24    Ca    8.6      09 Nov 2023 00:31  Phos  4.7     11-09  Mg     2.30     11-09    TPro  6.5  /  Alb  2.0<L>  /  TBili  <0.2  /  DBili  x   /  AST  25  /  ALT  16  /  AlkPhos  320<H>  11-09          Urine Studies:  Urinalysis Basic - ( 09 Nov 2023 00:31 )    Color: x / Appearance: x / SG: x / pH: x  Gluc: 165 mg/dL / Ketone: x  / Bili: x / Urobili: x   Blood: x / Protein: x / Nitrite: x   Leuk Esterase: x / RBC: x / WBC x   Sq Epi: x / Non Sq Epi: x / Bacteria: x        IMPRESSION: 73F w/ HTN, DM2, CVA, breast CA-bilateral mastectomy, recurrent aspiration pneumonia/respiratory failure, and CKD, 8/11/23 p/w acute hypercapnic respiratory failure; c/b RUSS    (1)Renal - RUSS on CKD4 ==>now newly ESRD. S/p HD yesterday     (2)Hyponatremia - Na+ improving with HD    (3)CV- tenuous hemodynamics such with each passing week we have more difficulty performing HD/achieving UF, persistent issue. BP stable with Jacki, though continues to need pressors for HD    (4)ID- BCx from 10/14/23 NGTD, BC (10/17) NGTD - s/p IV abx     (5)Pulm- trach/vent-dependent    (6)Anemia- hgb < goal though stable, epo with HD    (7)Hyperkalemia - improved with HD    RECOMMEND:  (1)HD tomorrow: pressors and sodium modelling as needed to keep SBP>90; Epogen with HD   (2)Dose new meds for GFR <10/HD    [place] Regla FELDER  [facility]  [phone]              [facility] [phone]        Patient seen and examined she had yesterday 2.6L removed. Levophed resumed for HD then titrated off.         MEDICATIONS  (STANDING):  albuterol/ipratropium for Nebulization 3 milliLiter(s) Nebulizer every 6 hours  apixaban 5 milliGRAM(s) Oral every 12 hours  artificial tears (preservative free) Ophthalmic Solution 1 Drop(s) Both EYES every 4 hours  atorvastatin 10 milliGRAM(s) Oral at bedtime  calamine/zinc oxide Lotion 1 Application(s) Topical daily  chlorhexidine 0.12% Liquid 15 milliLiter(s) Oral Mucosa every 12 hours  chlorhexidine 2% Cloths 1 Application(s) Topical daily  dextrose 5%. 1000 milliLiter(s) (100 mL/Hr) IV Continuous <Continuous>  dextrose 5%. 1000 milliLiter(s) (50 mL/Hr) IV Continuous <Continuous>  dextrose 50% Injectable 25 Gram(s) IV Push once  dextrose 50% Injectable 25 Gram(s) IV Push once  dextrose 50% Injectable 25 Gram(s) IV Push once  dextrose 50% Injectable 12.5 Gram(s) IV Push once  droxidopa 600 milliGRAM(s) Oral every 8 hours  epoetin odalis (EPOGEN) Injectable 75758 Unit(s) IV Push <User Schedule>  ferrous    sulfate Liquid 300 milliGRAM(s) Oral daily  glucagon  Injectable 1 milliGRAM(s) IntraMuscular once  insulin lispro (ADMELOG) corrective regimen sliding scale   SubCutaneous every 6 hours  insulin NPH human recombinant 6 Unit(s) SubCutaneous every 6 hours  levETIRAcetam  Solution 1000 milliGRAM(s) Oral daily  levETIRAcetam  Solution 250 milliGRAM(s) Oral <User Schedule>  midodrine. 40 milliGRAM(s) Oral <User Schedule>  norepinephrine Infusion 0.05 MICROgram(s)/kG/Min (6.23 mL/Hr) IV Continuous <Continuous>  pantoprazole  Injectable 40 milliGRAM(s) IV Push two times a day  petrolatum Ophthalmic Ointment 1 Application(s) Both EYES four times a day  sodium chloride 1 Gram(s) Oral two times a day  sodium chloride 3%  Inhalation 4 milliLiter(s) Inhalation every 6 hours      VITAL:  T(C): , Max: 37.3 (11-09-23 @ 08:00)  T(F): , Max: 99.1 (11-09-23 @ 08:00)  HR: 116 (11-09-23 @ 15:07)  BP: --  BP(mean): --  RR: 0 (11-09-23 @ 14:00)  SpO2: 100% (11-09-23 @ 15:07)  Wt(kg): --    I and O's:    11-08 @ 07:01  -  11-09 @ 07:00  --------------------------------------------------------  IN: 1262 mL / OUT: 3200 mL / NET: -1938 mL          PHYSICAL EXAM:  Constitutional: critically ill, trach to vent   HEENT: + NGT, + tracheostomy   Respiratory: coarse BS  Cardiovascular: tachy   Gastrointestinal: BS+, soft, NT/ND  Extremities: ++ generalized edema  Neurological: UTO  : No Wheeler  Skin: No rashes  Access: [facility]     LABS:                        9.0    14.04 )-----------( 473      ( 09 Nov 2023 00:31 )             29.5     11-09    136  |  100  |  27<H>  ----------------------------<  165<H>  4.5   |  27  |  1.24    Ca    8.6      09 Nov 2023 00:31  Phos  4.7     11-09  Mg     2.30     11-09    TPro  6.5  /  Alb  2.0<L>  /  TBili  <0.2  /  DBili  x   /  AST  25  /  ALT  16  /  AlkPhos  320<H>  11-09          Urine Studies:  Urinalysis Basic - ( 09 Nov 2023 00:31 )    Color: x / Appearance: x / SG: x / pH: x  Gluc: 165 mg/dL / Ketone: x  / Bili: x / Urobili: x   Blood: x / Protein: x / Nitrite: x   Leuk Esterase: x / RBC: x / WBC x   Sq Epi: x / Non Sq Epi: x / Bacteria: x        IMPRESSION: 73F w/ HTN, DM2, CVA, breast CA-bilateral mastectomy, recurrent aspiration pneumonia/respiratory failure, and CKD, 8/11/23 p/w acute hypercapnic respiratory failure; c/b RUSS    (1)Renal - RUSS on CKD4 ==>now newly ESRD. S/p HD yesterday     (2)Hyponatremia - Na+ improving with HD    (3)CV- tenuous hemodynamics such with each passing week we have more difficulty performing HD/achieving UF, persistent issue. BP stable with Jacki, though continues to need pressors for HD    (4)ID- BCx from 10/14/23 NGTD, BC (10/17) NGTD - s/p IV abx     (5)Pulm- trach/vent-dependent    (6)Anemia- hgb < goal though stable, epo with HD    (7)Hyperkalemia - improved with HD    RECOMMEND:  (1)HD tomorrow: pressors and sodium modelling as needed to keep SBP>90; Epogen with HD   (2)Dose new meds for GFR <10/HD    Tere Arauz NP  [facility]  [phone]         RENAL ATTENDING NOTE  Patient seen and examined with NP. Agree with assessment and plan as above.  Tolerated 2.6L off yesterday and off pressor gtts at present. We can try again for 2.6L UF tomorrow.    Jimmy Colon MD  [facility]  [phone]

## 2023-11-09 NOTE — PROGRESS NOTE ADULT - ASSESSMENT
74 y/o F well [facility] to me [facility] she was admitted at [facility] 7/12-7/22 w aspiration PNA, was treated w CEFEPIME, developed an allergic rash,  dCHF, + MAC on AFB culture, had been progressively getting more and more lethargic and dyspneic at home since DC.   In  am of 8/11/23  ptn presented with respiratory distress w hypoxia and hypercarbia requiring intubation 2/2 volume overload +/- Asp PNA      Neuro   not responsive  Baseline MS AOx3, aphasic   - h/o CVA , on aspirin and statin . resumed w feeding tube, ASA resumed 8/26  - eeg  2/2 tremors, no sz focus, right facial twitching, on KEPPRA  - less responsive in the past few days  - MRI 8/17:  new R cerebellar infarct, old left PCA/Occipital infarct. probably embolic in nature, did not tolerate full AC in the past, STEPHANIE is neg , no shunts observed  - ptn is poorly reactive, rpt scan done, no further CVA seen.     Cardiac   cardiology following  CHFpEF   TTE 7/2023 with EF 59%, with severe LVH and diastolic dysfunction   off Levo drip , now only during HD  on midodrine 40 mg qid, droxidopa 600 tid, additional 200 mg prior to HD,     Pulmonary   Acute hypercapnic and hypoxemic respiratory failure   prolonged intubation, now  trache to  vent, has copious secretions      Renal   on HD via L IJ [place], tunneled catheter when clinically stable  HD MWF, midodrine and pressor assisted,   permacath when clinically stable      GI  NPO  on tube feeds  UGI bleed 8/31,  EGD/Colonoscopy: erosive gastropathy, esophagitis on colonoscopy old blood seen no active bleeding, poor prep  NGT-TF  s/p 2UPRBC 11/4    Endocrine  on Insulin: NPH, Admelog    ID   complicated infectious hospital course  treated for MSSA bacteremia, aspiration PNA.  sputum + for pseudomonas, ptn was initially on zosyn but was allergic, then was switched to aztreonam   Aztreonam was switched to polymyxin for a possible allergy but ptn didnt have a reaction to aztreonam, she had a reaction to Zosyn.   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 externa resolved    Heme/Onc  on eliquis for  LEFT POPLITEAL VEIN ACUTE DVT , on ELiquis    Ethics  GOC - Discussed GOC with daughter and , they have opted in the past for full code. and she remains full code at present

## 2023-11-09 NOTE — PROGRESS NOTE ADULT - ATTENDING COMMENTS
Patient examined and case reviewed in detail on bedside rounds  Critically ill and unstable on pressors Will attempt to maintain BP with oral antihypotensive agents during HD tomorrow  Frequent bedside visits with therapy change today.   I have personally and independently provided 35+ minutes of critical care services; this excludes time spent on separate procedures or teaching

## 2023-11-09 NOTE — PROGRESS NOTE ADULT - ASSESSMENT

## 2023-11-09 NOTE — PROGRESS NOTE ADULT - SUBJECTIVE AND OBJECTIVE BOX
Subjective: Patient seen and examined. No new events except as noted.   remains in ICU     REVIEW OF SYSTEMS:    Unable to obtain     MEDICATIONS:  MEDICATIONS  (STANDING):  albuterol/ipratropium for Nebulization 3 milliLiter(s) Nebulizer every 6 hours  apixaban 5 milliGRAM(s) Oral every 12 hours  artificial tears (preservative free) Ophthalmic Solution 1 Drop(s) Both EYES every 4 hours  atorvastatin 10 milliGRAM(s) Oral at bedtime  calamine/zinc oxide Lotion 1 Application(s) Topical daily  chlorhexidine 0.12% Liquid 15 milliLiter(s) Oral Mucosa every 12 hours  chlorhexidine 2% Cloths 1 Application(s) Topical daily  dextrose 5%. 1000 milliLiter(s) (100 mL/Hr) IV Continuous <Continuous>  dextrose 5%. 1000 milliLiter(s) (50 mL/Hr) IV Continuous <Continuous>  dextrose 50% Injectable 25 Gram(s) IV Push once  dextrose 50% Injectable 25 Gram(s) IV Push once  dextrose 50% Injectable 25 Gram(s) IV Push once  dextrose 50% Injectable 12.5 Gram(s) IV Push once  droxidopa 600 milliGRAM(s) Oral every 8 hours  epoetin odalis (EPOGEN) Injectable 50123 Unit(s) IV Push <User Schedule>  ferrous    sulfate Liquid 300 milliGRAM(s) Oral daily  glucagon  Injectable 1 milliGRAM(s) IntraMuscular once  insulin lispro (ADMELOG) corrective regimen sliding scale   SubCutaneous every 6 hours  insulin NPH human recombinant 6 Unit(s) SubCutaneous every 6 hours  levETIRAcetam  Solution 1000 milliGRAM(s) Oral daily  levETIRAcetam  Solution 250 milliGRAM(s) Oral <User Schedule>  midodrine. 40 milliGRAM(s) Oral <User Schedule>  norepinephrine Infusion 0.05 MICROgram(s)/kG/Min (6.23 mL/Hr) IV Continuous <Continuous>  pantoprazole  Injectable 40 milliGRAM(s) IV Push two times a day  petrolatum Ophthalmic Ointment 1 Application(s) Both EYES four times a day  sodium chloride 1 Gram(s) Oral two times a day  sodium chloride 3%  Inhalation 4 milliLiter(s) Inhalation every 6 hours      PHYSICAL EXAM:  T(C): 37.3 (11-09-23 @ 08:00), Max: 37.3 (11-09-23 @ 08:00)  HR: 109 (11-09-23 @ 10:00) (108 - 129)  BP: --  RR: 24 (11-09-23 @ 10:00) (12 - 24)  SpO2: 100% (11-09-23 @ 10:00) (95% - 100%)  Wt(kg): --  I&O's Summary    08 Nov 2023 07:01  -  09 Nov 2023 07:00  --------------------------------------------------------  IN: 1262 mL / OUT: 3200 mL / NET: -1938 mL          Appearance: NAD, +trach  +NGT  HEENT: dry oral mucosa  Lymphatic: No lymphadenopathy  Cardiovascular: Normal S1 S2, No JVD, No murmurs, No edema  Respiratory: Decreased BS, + trach to vent	  Neuro: opens eyes  Gastrointestinal: Soft, Non-tender, + BS, + NGT  Skin: No rashes, No ecchymoses, No cyanosis	  Extremities: No strength/ROM 2/2 sedation, + BL LE edema  Vascular: Peripheral pulses palpable 2+ bilaterally  Anasarca   Mode: AC/ CMV (Assist Control/ Continuous Mandatory Ventilation), RR (machine): 24, FiO2: 40, PEEP: 8, ITime: 0.8, MAP: 20, PC: 35, PIP: 44        LABS:    CARDIAC MARKERS:                                9.0    14.04 )-----------( 473      ( 09 Nov 2023 00:31 )             29.5     11-09    136  |  100  |  27<H>  ----------------------------<  165<H>  4.5   |  27  |  1.24    Ca    8.6      09 Nov 2023 00:31  Phos  4.7     11-09  Mg     2.30     11-09    TPro  6.5  /  Alb  2.0<L>  /  TBili  <0.2  /  DBili  x   /  AST  25  /  ALT  16  /  AlkPhos  320<H>  11-09    proBNP:   Lipid Profile:   HgA1c:   TSH:             TELEMETRY: 	  SR ST   ECG:  	  RADIOLOGY:   DIAGNOSTIC TESTING:  [ ] Echocardiogram:  [ ]  Catheterization:  [ ] Stress Test:    OTHER:

## 2023-11-09 NOTE — PROGRESS NOTE ADULT - SUBJECTIVE AND OBJECTIVE BOX
Subjective: Patient seen and examined. No new events except as noted.   remains in ICU     SUBJECTIVE: trache to vent, HD , pressor assisted MWF      MEDICATIONS:  MEDICATIONS  (STANDING):  albuterol/ipratropium for Nebulization 3 milliLiter(s) Nebulizer every 6 hours  apixaban 5 milliGRAM(s) Oral every 12 hours  artificial tears (preservative free) Ophthalmic Solution 1 Drop(s) Both EYES every 4 hours  atorvastatin 10 milliGRAM(s) Oral at bedtime  calamine/zinc oxide Lotion 1 Application(s) Topical daily  chlorhexidine 0.12% Liquid 15 milliLiter(s) Oral Mucosa every 12 hours  chlorhexidine 2% Cloths 1 Application(s) Topical daily  dextrose 5%. 1000 milliLiter(s) (50 mL/Hr) IV Continuous <Continuous>  dextrose 5%. 1000 milliLiter(s) (100 mL/Hr) IV Continuous <Continuous>  dextrose 50% Injectable 12.5 Gram(s) IV Push once  dextrose 50% Injectable 25 Gram(s) IV Push once  dextrose 50% Injectable 25 Gram(s) IV Push once  dextrose 50% Injectable 25 Gram(s) IV Push once  droxidopa 600 milliGRAM(s) Oral every 8 hours  epoetin odalis (EPOGEN) Injectable 43445 Unit(s) IV Push <User Schedule>  ferrous    sulfate Liquid 300 milliGRAM(s) Oral daily  glucagon  Injectable 1 milliGRAM(s) IntraMuscular once  insulin lispro (ADMELOG) corrective regimen sliding scale   SubCutaneous every 6 hours  insulin NPH human recombinant 6 Unit(s) SubCutaneous every 6 hours  levETIRAcetam  Solution 1000 milliGRAM(s) Oral daily  levETIRAcetam  Solution 250 milliGRAM(s) Oral <User Schedule>  midodrine. 40 milliGRAM(s) Oral <User Schedule>  norepinephrine Infusion 0.02 MICROgram(s)/kG/Min (2.49 mL/Hr) IV Continuous <Continuous>  pantoprazole  Injectable 40 milliGRAM(s) IV Push two times a day  petrolatum Ophthalmic Ointment 1 Application(s) Both EYES four times a day  sodium chloride 1 Gram(s) Oral two times a day  sodium chloride 3%  Inhalation 4 milliLiter(s) Inhalation every 6 hours      PHYSICAL EXAM:  T(C): 37.1 (11-10-23 @ 12:00), Max: 37.3 (11-09-23 @ 20:00)  HR: 114 (11-10-23 @ 13:00) (104 - 123)  BP: --  RR: 24 (11-10-23 @ 13:00) (19 - 28)  SpO2: 99% (11-10-23 @ 13:00) (84% - 100%)    Appearance: NAD, +trach  +NGT  HEENT: dry oral mucosa  Lymphatic: No lymphadenopathy  Cardiovascular: Normal S1 S2, No JVD, No murmurs, No edema  Respiratory: Decreased BS, + trach to vent	  Neuro: opens eyes  Gastrointestinal: Soft, Non-tender, + BS, + NGT  Skin: No rashes, No ecchymoses, No cyanosis	  Extremities: No strength/ROM 2/2 sedation, + BL LE edema  Vascular: Peripheral pulses palpable 2+ bilaterally  Anasarca   Mode: AC/ CMV (Assist Control/ Continuous Mandatory Ventilation), RR (machine): 24, FiO2: 40, PEEP: 8, ITime: 0.8, MAP: 19, PC: 35, PIP: 43      LABS:                          8.9    12.38 )-----------( 520      ( 10 Nov 2023 01:15 )             30.0     11-10    132<L>  |  98  |  35<H>  ----------------------------<  172<H>  5.0   |  24  |  1.42<H>    Ca    8.7      10 Nov 2023 01:15  Phos  5.6     11-10  Mg     2.50     11-10    TPro  6.7  /  Alb  2.0<L>  /  TBili  <0.2  /  DBili  x   /  AST  23  /  ALT  17  /  AlkPhos  314<H>  11-10

## 2023-11-09 NOTE — PROGRESS NOTE ADULT - SUBJECTIVE AND OBJECTIVE BOX
S:  Pt seen and examined.       Medications: MEDICATIONS  (STANDING):  albuterol/ipratropium for Nebulization 3 milliLiter(s) Nebulizer every 6 hours  apixaban 5 milliGRAM(s) Oral every 12 hours  artificial tears (preservative free) Ophthalmic Solution 1 Drop(s) Both EYES every 4 hours  atorvastatin 10 milliGRAM(s) Oral at bedtime  calamine/zinc oxide Lotion 1 Application(s) Topical daily  chlorhexidine 0.12% Liquid 15 milliLiter(s) Oral Mucosa every 12 hours  chlorhexidine 2% Cloths 1 Application(s) Topical daily  dextrose 5%. 1000 milliLiter(s) (50 mL/Hr) IV Continuous <Continuous>  dextrose 5%. 1000 milliLiter(s) (100 mL/Hr) IV Continuous <Continuous>  dextrose 50% Injectable 12.5 Gram(s) IV Push once  dextrose 50% Injectable 25 Gram(s) IV Push once  dextrose 50% Injectable 25 Gram(s) IV Push once  dextrose 50% Injectable 25 Gram(s) IV Push once  droxidopa 600 milliGRAM(s) Oral every 8 hours  epoetin odalis (EPOGEN) Injectable 28543 Unit(s) IV Push <User Schedule>  ferrous    sulfate Liquid 300 milliGRAM(s) Oral daily  glucagon  Injectable 1 milliGRAM(s) IntraMuscular once  insulin lispro (ADMELOG) corrective regimen sliding scale   SubCutaneous every 6 hours  insulin NPH human recombinant 6 Unit(s) SubCutaneous every 6 hours  levETIRAcetam  Solution 1000 milliGRAM(s) Oral daily  levETIRAcetam  Solution 250 milliGRAM(s) Oral <User Schedule>  midodrine. 40 milliGRAM(s) Oral <User Schedule>  norepinephrine Infusion 0.05 MICROgram(s)/kG/Min (6.23 mL/Hr) IV Continuous <Continuous>  pantoprazole  Injectable 40 milliGRAM(s) IV Push two times a day  petrolatum Ophthalmic Ointment 1 Application(s) Both EYES four times a day  sodium chloride 1 Gram(s) Oral two times a day  sodium chloride 3%  Inhalation 4 milliLiter(s) Inhalation every 6 hours    MEDICATIONS  (PRN):  acetaminophen   Oral Liquid .. 650 milliGRAM(s) Oral every 6 hours PRN Temp greater or equal to 38C (100.4F), Mild Pain (1 - 3)  dextrose Oral Gel 15 Gram(s) Oral once PRN Blood Glucose LESS THAN 70 milliGRAM(s)/deciliter  diphenhydrAMINE Elixir 50 milliGRAM(s) Oral once PRN Rash and/or Itching  droxidopa 600 milliGRAM(s) Oral <User Schedule> PRN prior to hemodialysis  midodrine. 40 milliGRAM(s) Oral once PRN 1 Hour Pre HD  sodium chloride 0.9% Bolus. 250 milliLiter(s) IV Bolus every 5 minutes PRN SBP LESS THAN or EQUAL to 90 mmHg  sodium chloride 0.9% lock flush 10 milliLiter(s) IV Push every 1 hour PRN Pre/post blood products, medications, blood draw, and to maintain line patency       Vitals:  Vital Signs Last 24 Hrs  T(C): 37.3 (09 Nov 2023 20:00), Max: 37.3 (09 Nov 2023 08:00)  T(F): 99.2 (09 Nov 2023 20:00), Max: 99.2 (09 Nov 2023 20:00)  HR: 112 (09 Nov 2023 21:00) (108 - 122)  BP: --  BP(mean): --  RR: 24 (09 Nov 2023 21:00) (15 - 27)  SpO2: 100% (09 Nov 2023 21:00) (84% - 100%)    Parameters below as of 09 Nov 2023 21:00  Patient On (Oxygen Delivery Method): ventilator    O2 Concentration (%): 40      LABS:                          9.0    14.04 )-----------( 473      ( 09 Nov 2023 00:31 )             29.5     11-09    136  |  100  |  27<H>  ----------------------------<  165<H>  4.5   |  27  |  1.24    Ca    8.6      09 Nov 2023 00:31  Phos  4.7     11-09  Mg     2.30     11-09    TPro  6.5  /  Alb  2.0<L>  /  TBili  <0.2  /  DBili  x   /  AST  25  /  ALT  16  /  AlkPhos  320<H>  11-09    LIVER FUNCTIONS - ( 09 Nov 2023 00:31 )  Alb: 2.0 g/dL / Pro: 6.5 g/dL / ALK PHOS: 320 U/L / ALT: 16 U/L / AST: 25 U/L / GGT: x           PTT - ( 09 Nov 2023 00:31 )  PTT:30.3 sec  Urinalysis Basic - ( 09 Nov 2023 00:31 )    Color: x / Appearance: x / SG: x / pH: x  Gluc: 165 mg/dL / Ketone: x  / Bili: x / Urobili: x   Blood: x / Protein: x / Nitrite: x   Leuk Esterase: x / RBC: x / WBC x   Sq Epi: x / Non Sq Epi: x / Bacteria: x          Neurological Exam:  Mental Status: Eyes closed, minimal spont eye opening off sedation. Does not follow commands,  + trach to vent and NGT   Cranial Nerves: PERRL slightly sluggish, R facial twitching noted by mouth lessened today- ? suppressible. occasional head dystonia  Motor: Moves upper extremities spontaneously R >L, tremor R > L  no movement noted in lowers  Sensation: WD to noxious x4    I personally reviewed the below data/images/labs:        < from: CT Head No Cont (08.15.23 @ 17:20) >    ACC: [id number] EXAM:  CT BRAIN   ORDERED BY: MINAL SAM     PROCEDURE DATE:  08/15/2023          INTERPRETATION:  Clinical indication: Change in neuro exam.    Multiple axial sections were performed from base to vertex without   contrast enhancement. Coronal and sagittal reconstructions were   reformatted well.    This exam is compared prior head CT performed on August 11, 2023    Parenchymal volume loss and chronic microvessel ischemic changes are   again seen.    Abnormal low-attenuation involving the left occipital cortical   subcortical region is again seen. 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    No evidence of acute hemorrhage mass or mass effect is seen.    Evaluation of the osseous structures with appropriate window demonstrates   sclerotic changes about the left mastoid region which appears stable.   Opacification left middle ear region is again seen.    Patient is status post bilateral cataract surgery.    Impression: Stable exam.    < end of copied text >    vEEG:    Clinical Impression:  - Severe diffuse non-specific cerebral dysfunction  - There were no epileptiform abnormalities or seizures recorded.        CTH 8/11:    VENTRICLES AND SULCI: Age-appropriate involutional change  INTRA-AXIAL:  Old left PCA infarct as seen on the prior unchanged.   Microvascular ischemic changes involving the periventricular and   subcortical white matter as seen previously  EXTRA-AXIAL:  No mass or collection is seen.  VISUALIZED SINUSES:  Clear.  VISUALIZED MASTOIDS: Left mastoid sclerosis  CALVARIUM: Infiltrative appearance tothe calvarium may be indicative of   marrow infiltration on the basis of patient's known diagnosis of breast   cancer. MISCELLANEOUS:  None.    IMPRESSION:  No significant interval change compared with 7/17/2023 in   left PCA infarct which is old. Microvascular ischemic changes involving   the periventricular and subcortical white matter as seen   previously.Questionable lesions at the level of the calvarium related to   possible breast CA. Clinical correlation recommended.    --- End of Report ---      ct< from: CT Head No Cont (09.26.23 @ 21:02) >  IMPRESSION: Vague questionable areas of hypoattenuation within the   bilateral cerebellar hemispheres which may be compatible with   acute/subacute ischemia. Recommend further evaluation with a brain MRI   study, provided there are no MRI contraindications.    No acute intracranial hemorrhage.    Previously seen questionable vague wedge-shaped area of hypoattenuation   in the left parietal lobe with artifactual secondary to motion and volume   averaging with a prominent sulcus.    Chronic left occipital lobe infarct.    < end of copied text >    < from: CT Head No Cont (10.03.23 @ 20:46) >    IMPRESSION:    No acute intracranial hemorrhage or mass effect. Evaluation of the   posterior fossa degraded by streak artifact from dental amalgam.   Lucencies in the bilateral cerebellum may be artifactual.    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    Bilateral middle ear and mastoid effusions which are also seen on prior   exam.    EEG Classification / Summary:  Abnormal EEG in the awake, drowsy states.   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   -No seizures

## 2023-11-09 NOTE — PROGRESS NOTE ADULT - SUBJECTIVE AND OBJECTIVE BOX
MICU Progress Note    INTERVAL HPI/OVERNIGHT EVENTS:    SUBJECTIVE: Patient seen and examined at bedside.     ROS: unable to assess    OBJECTIVE:    VITAL SIGNS:  ICU Vital Signs Last 24 Hrs  T(C): 37.2 (09 Nov 2023 04:00), Max: 37.2 (09 Nov 2023 04:00)  T(F): 99 (09 Nov 2023 04:00), Max: 99 (09 Nov 2023 04:00)  HR: 119 (09 Nov 2023 07:00) (108 - 129)  BP: --  BP(mean): --  ABP: 117/40 (09 Nov 2023 07:00) (78/33 - 155/63)  ABP(mean): 72 (09 Nov 2023 07:00) (51 - 103)  RR: 24 (09 Nov 2023 07:00) (8 - 25)  SpO2: 97% (09 Nov 2023 07:00) (95% - 100%)    O2 Parameters below as of 09 Nov 2023 07:00  Patient On (Oxygen Delivery Method): ventilator    O2 Concentration (%): 40      Mode: AC/ CMV (Assist Control/ Continuous Mandatory Ventilation), RR (machine): 24, FiO2: 40, PEEP: 8, ITime: 0.8, MAP: 20, PC: 35, PIP: 44    11-08 @ 07:01  -  11-09 @ 07:00  --------------------------------------------------------  IN: 1262 mL / OUT: 3200 mL / NET: -1938 mL      CAPILLARY BLOOD GLUCOSE      POCT Blood Glucose.: 156 mg/dL (09 Nov 2023 05:28)      PHYSICAL EXAM:    General: NAD  HEENT: NC/AT; PERRL, clear conjunctiva  Neck: supple  Respiratory: CTA b/l  Cardiovascular: +S1/S2; RRR  Abdomen: soft, NT/ND; +BS x4  Extremities: WWP, 2+ peripheral pulses b/l; no LE edema  Skin: normal color and turgor; no rash  Neurological:    MEDICATIONS:  MEDICATIONS  (STANDING):  albuterol/ipratropium for Nebulization 3 milliLiter(s) Nebulizer every 6 hours  apixaban 5 milliGRAM(s) Oral every 12 hours  artificial tears (preservative free) Ophthalmic Solution 1 Drop(s) Both EYES every 4 hours  atorvastatin 10 milliGRAM(s) Oral at bedtime  calamine/zinc oxide Lotion 1 Application(s) Topical daily  chlorhexidine 0.12% Liquid 15 milliLiter(s) Oral Mucosa every 12 hours  chlorhexidine 2% Cloths 1 Application(s) Topical daily  dextrose 5%. 1000 milliLiter(s) (100 mL/Hr) IV Continuous <Continuous>  dextrose 5%. 1000 milliLiter(s) (50 mL/Hr) IV Continuous <Continuous>  dextrose 50% Injectable 12.5 Gram(s) IV Push once  dextrose 50% Injectable 25 Gram(s) IV Push once  dextrose 50% Injectable 25 Gram(s) IV Push once  dextrose 50% Injectable 25 Gram(s) IV Push once  droxidopa 600 milliGRAM(s) Oral every 8 hours  epoetin odalis (EPOGEN) Injectable 11677 Unit(s) IV Push <User Schedule>  ferrous    sulfate Liquid 300 milliGRAM(s) Oral daily  glucagon  Injectable 1 milliGRAM(s) IntraMuscular once  insulin lispro (ADMELOG) corrective regimen sliding scale   SubCutaneous every 6 hours  insulin NPH human recombinant 6 Unit(s) SubCutaneous every 6 hours  levETIRAcetam  Solution 1000 milliGRAM(s) Oral daily  levETIRAcetam  Solution 250 milliGRAM(s) Oral <User Schedule>  midodrine. 40 milliGRAM(s) Oral <User Schedule>  norepinephrine Infusion 0.05 MICROgram(s)/kG/Min (6.23 mL/Hr) IV Continuous <Continuous>  pantoprazole  Injectable 40 milliGRAM(s) IV Push two times a day  petrolatum Ophthalmic Ointment 1 Application(s) Both EYES four times a day  sodium chloride 1 Gram(s) Oral two times a day  sodium chloride 3%  Inhalation 4 milliLiter(s) Inhalation every 6 hours    MEDICATIONS  (PRN):  acetaminophen   Oral Liquid .. 650 milliGRAM(s) Oral every 6 hours PRN Temp greater or equal to 38C (100.4F), Mild Pain (1 - 3)  dextrose Oral Gel 15 Gram(s) Oral once PRN Blood Glucose LESS THAN 70 milliGRAM(s)/deciliter  diphenhydrAMINE Elixir 50 milliGRAM(s) Oral once PRN Rash and/or Itching  droxidopa 600 milliGRAM(s) Oral <User Schedule> PRN prior to hemodialysis  midodrine. 20 milliGRAM(s) Oral <User Schedule> PRN 1 Hour Pre HD  sodium chloride 0.9% Bolus. 250 milliLiter(s) IV Bolus every 5 minutes PRN SBP LESS THAN or EQUAL to 90 mmHg  sodium chloride 0.9% lock flush 10 milliLiter(s) IV Push every 1 hour PRN Pre/post blood products, medications, blood draw, and to maintain line patency      ALLERGIES:  Allergies    isoniazid (Rash)  nafcillin (Unknown)  hydrALAZINE (Rash)  vitamin E (Short breath; Urticaria; Hives)  doxycycline (Rash)  cefepime (Rash)  NIFEdipine (Urticaria; Hives)  Zosyn (Rash)    Intolerances        LABS:                        9.0    14.04 )-----------( 473      ( 09 Nov 2023 00:31 )             29.5     11-09    136  |  100  |  27<H>  ----------------------------<  165<H>  4.5   |  27  |  1.24    Ca    8.6      09 Nov 2023 00:31  Phos  4.7     11-09  Mg     2.30     11-09    TPro  6.5  /  Alb  2.0<L>  /  TBili  <0.2  /  DBili  x   /  AST  25  /  ALT  16  /  AlkPhos  320<H>  11-09    PTT - ( 09 Nov 2023 00:31 )  PTT:30.3 sec  Urinalysis Basic - ( 09 Nov 2023 00:31 )    Color: x / Appearance: x / SG: x / pH: x  Gluc: 165 mg/dL / Ketone: x  / Bili: x / Urobili: x   Blood: x / Protein: x / Nitrite: x   Leuk Esterase: x / RBC: x / WBC x   Sq Epi: x / Non Sq Epi: x / Bacteria: x        RADIOLOGY & ADDITIONAL TESTS: Reviewed.

## 2023-11-09 NOTE — PROGRESS NOTE ADULT - ASSESSMENT
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       NEUROLOGY  #AMS  Multiple etiologies including active infection vs CVA.   MR head performed w/ new infarct and old infarcts, EEG without seizure events but with high foci potential   - f/u spot EEG given facial twitching: negative for epileptiform activity (10/31)  - deferring repeat MRI as unlikely to   - continue lipitor  - continue keppra given above  - aspirin held 10/11, likely in s/o GIB, Hgb stable not requiring pRBC since 10/14      CARDIOVASCULAR  # Septic vs vasoplegic shock   Etiology likely septic iso active infection. Possible component of vasoplegic, however no longer with active infection or on sedation. Off IV vasopressors.   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   - resolved    RESPIRATORY  # AHRF second to volume overload   - CKD vs HFpEF w/ hypercarbia 2/2 volume overload requiring intubation, trach, and HD initiation  - Continue on albuterol and chest PT  - Continue volume removal with HD      #tracheomalacia   - CT CHEST (9/8) with tracheomalacia, BL pleural effusions and continued consolidations     GI  # UGIB: last pRBC transfused 11/4 (10/14 before that)   - Continue on PPI BID    # Dysphagia   - NGT-TF     RENAL  # ESRD  - HD MWF TIW with midodrine and droxidopa prior  - ICU for vasopressor assisted volume removal, cap to 1 pressor and not offering CRRT due to comorbidities and prognosis   - f/u w/ nephrology: will continue to offer patient 1-pressor assisted HD as agreed prior, as long as she can tolerate HD maxed on 1 pressor, she will be considered iHD candidate  - UF -2.5L attempted 10/30, reached 1.7L limited by hypotension and tachycardia to 130s, able to remove 2L 11/1, 2L 11/3, 2L 11/6,     INFECTIOUS DISEASE  #Sepsis: temp 101.2 on 11/2; sources include skin (poorly demarcated area of induration/erythema, enlarging on RLQ), pulm (copious sputum)  - Bcx, sputum cx sent  - repeat MRSA PCR neg  - ID re-consulted: abd edema likely 2/2 chronic panniculitis  - Abx:      --> Vanc (11/2) - MRSA neg, dc'd     --> Aztreo 2g (11/2) --> dc'd, 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 Coli sputum Cx, BAL w/ MSSA  - treated with abx   - eosinophilia workup: JORGE, ANCAs negative    # MAC   - outpatient treatment    HEME  # Anemia second to GIB vs renal disease   - multiple transfusions, last done 11/4  - management as per renal, PPI    #Allergic transfusion reaction: per RCU documentation, developed erythematous rash across chest shortly after starting transfusion, blood bank consulted and diagnosed mild allergic rxn  - premedicate w/ benadryl and famotidine prior to future transfusions    VASCULAR   # LLE POP DVT   - on Eliquis    # HD access  - TRISHA TAYLOR (9/27 - 10/21)  - TRISHA taylor replaced for HD     ONC   # Hx of Breast CA   - Patient dx in 2018 and s/p BL mastectomy (radical on right) and chemo and RT.     ENDOCRINE  # IDDM2   - Continued on NPH with ISS, however noted with hypoglycemic episodes and NPH dc'ed.    - Finger sticks trending up off NPH.   - Continue on NPH 6U with moderate ISS.   - Adjust as need     SKIN  # Drug Eruption   - treated    ETHICS/ GOC    - Attempted palliative discussion however family not interested and wishes for FULL CODE  - Family continues to want everything done despite inability to tolerate HD on multiple oral pressor  - Levo capped at 0.3 during HD

## 2023-11-10 NOTE — PROGRESS NOTE ADULT - ASSESSMENT
72 y/o F well known to me from [facility] [place] [facility] at [facility] 7/12-7/22 w aspiration PNA, was treated w CEFEPIME, developed an allergic rash,  dCHF, + MAC on AFB culture, had been progressively getting more and more lethargic and dyspneic at home since DC.   In  am of 8/11/23  ptn presented with respiratory distress w hypoxia and hypercarbia requiring intubation 2/2 volume overload +/- Asp PNA      Neuro   not responsive  Baseline MS AOx3, aphasic   - h/o CVA , on aspirin and statin . resumed w feeding tube, ASA resumed 8/26  - eeg  2/2 tremors, no sz focus, right facial twitching, on KEPPRA  - less responsive in the past few days  - MRI 8/17:  new R cerebellar infarct, old left PCA/Occipital infarct. probably embolic in nature, did not tolerate full AC in the past, STEPHANIE is neg , no shunts observed  - ptn is poorly reactive, rpt scan done, no further CVA seen.     Cardiac   cardiology following  CHFpEF   TTE 7/2023 with EF 59%, with severe LVH and diastolic dysfunction   off Levo drip , now only during HD  on midodrine 40 mg qid, droxidopa 600 tid, additional 200 mg prior to HD,     Pulmonary   Acute hypercapnic and hypoxemic respiratory failure   prolonged intubation, now  trache to  vent, has copious secretions      Renal   on HD via L IJ Shiley, tunneled catheter when clinically stable  HD MWF, midodrine and pressor assisted,   permacath when clinically stable      GI  NPO  on tube feeds  UGI bleed 8/31,  EGD/Colonoscopy: erosive gastropathy, esophagitis on colonoscopy old blood seen no active bleeding, poor prep  NGT-TF  s/p 2UPRBC 11/4    Endocrine  on Insulin: NPH, Admelog    ID   complicated infectious hospital course  treated for MSSA bacteremia, aspiration PNA.  sputum + for pseudomonas, ptn was initially on zosyn but was allergic, then was switched to aztreonam   Aztreonam was switched to polymyxin for a possible allergy but ptn didnt have a reaction to aztreonam, she had a reaction to Zosyn.   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 externa resolved    Heme/Onc  on eliquis for  LEFT POPLITEAL VEIN ACUTE DVT , on ELiquis    Ethics  GOC - Discussed GOC with daughter and , they have opted in the past for full code. and she remains full code at present

## 2023-11-10 NOTE — PROGRESS NOTE ADULT - ASSESSMENT
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       NEUROLOGY  #AMS  Multiple etiologies including active infection vs CVA.   MR head performed w/ new infarct and old infarcts, EEG without seizure events but with high foci potential   - f/u spot EEG given facial twitching: negative for epileptiform activity (10/31)  - deferring repeat MRI as unlikely to   - continue lipitor  - continue keppra given above  - aspirin held 10/11, likely in s/o GIB, Hgb stable not requiring pRBC since 10/14      CARDIOVASCULAR  # Septic vs vasoplegic shock   Etiology likely septic iso active infection. Possible component of vasoplegic, however no longer with active infection or on sedation. Off IV vasopressors.   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   - resolved    RESPIRATORY  # AHRF second to volume overload   - CKD vs HFpEF w/ hypercarbia 2/2 volume overload requiring intubation, trach, and HD initiation  - Continue on albuterol and chest PT  - Continue volume removal with HD      #tracheomalacia   - CT CHEST (9/8) with tracheomalacia, BL pleural effusions and continued consolidations     GI  # UGIB: last pRBC transfused 11/4 (10/14 before that)   - Continue on PPI BID    # Dysphagia   - NGT-TF     RENAL  # ESRD  - HD MWF TIW with midodrine and droxidopa prior  - ICU for vasopressor assisted volume removal, cap to 1 pressor and not offering CRRT due to comorbidities and prognosis   - f/u w/ nephrology: will continue to offer patient 1-pressor assisted HD as agreed prior, as long as she can tolerate HD maxed on 1 pressor, she will be considered iHD candidate  - UF -2.5L attempted 10/30, reached 1.7L limited by hypotension and tachycardia to 130s, able to remove 2L 11/1, 2L 11/3, 2L 11/6,     INFECTIOUS DISEASE  #Sepsis: temp 101.2 on 11/2; sources include skin (poorly demarcated area of induration/erythema, enlarging on RLQ), pulm (copious sputum)  - Bcx, sputum cx sent  - repeat MRSA PCR neg  - ID re-consulted: abd edema likely 2/2 chronic panniculitis  - Abx:      --> Vanc (11/2) - MRSA neg, dc'd     --> Aztreo 2g (11/2) --> dc'd, 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 Coli sputum Cx, BAL w/ MSSA  - treated with abx   - eosinophilia workup: JORGE, ANCAs negative    # MAC   - outpatient treatment    HEME  # Anemia second to GIB vs renal disease   - multiple transfusions, last done 11/4  - management as per renal, PPI    #Allergic transfusion reaction: per RCU documentation, developed erythematous rash across chest shortly after starting transfusion, blood bank consulted and diagnosed mild allergic rxn  - premedicate w/ benadryl and famotidine prior to future transfusions    VASCULAR   # LLE POP DVT   - on Eliquis    # HD access  - TRISHA TAYLOR (9/27 - 10/21)  - TRISHA taylor replaced for HD     ONC   # Hx of Breast CA   - Patient dx in 2018 and s/p BL mastectomy (radical on right) and chemo and RT.     ENDOCRINE  # IDDM2   - Continued on NPH with ISS, however noted with hypoglycemic episodes and NPH dc'ed.    - Finger sticks trending up off NPH.   - Continue on NPH 6U with moderate ISS.   - Adjust as need     SKIN  # Drug Eruption   - treated    ETHICS/ GOC    - Attempted palliative discussion however family not interested and wishes for FULL CODE  - Family continues to want everything done despite inability to tolerate HD on multiple oral pressor  - Levo capped at 0.3 during HD

## 2023-11-10 NOTE — PROGRESS NOTE ADULT - SUBJECTIVE AND OBJECTIVE BOX
NEPHROLOGY     Patient seen and examined, trach to vent, completed HD, levo gtt started, removed 2L.     MEDICATIONS  (STANDING):  albuterol/ipratropium for Nebulization 3 milliLiter(s) Nebulizer every 6 hours  apixaban 5 milliGRAM(s) Oral every 12 hours  artificial tears (preservative free) Ophthalmic Solution 1 Drop(s) Both EYES every 4 hours  atorvastatin 10 milliGRAM(s) Oral at bedtime  calamine/zinc oxide Lotion 1 Application(s) Topical daily  chlorhexidine 0.12% Liquid 15 milliLiter(s) Oral Mucosa every 12 hours  chlorhexidine 2% Cloths 1 Application(s) Topical daily  dextrose 5%. 1000 milliLiter(s) (50 mL/Hr) IV Continuous <Continuous>  dextrose 5%. 1000 milliLiter(s) (100 mL/Hr) IV Continuous <Continuous>  dextrose 50% Injectable 12.5 Gram(s) IV Push once  dextrose 50% Injectable 25 Gram(s) IV Push once  dextrose 50% Injectable 25 Gram(s) IV Push once  dextrose 50% Injectable 25 Gram(s) IV Push once  droxidopa 600 milliGRAM(s) Oral every 8 hours  epoetin odalis (EPOGEN) Injectable 25523 Unit(s) IV Push <User Schedule>  ferrous    sulfate Liquid 300 milliGRAM(s) Oral daily  glucagon  Injectable 1 milliGRAM(s) IntraMuscular once  insulin lispro (ADMELOG) corrective regimen sliding scale   SubCutaneous every 6 hours  insulin NPH human recombinant 6 Unit(s) SubCutaneous every 6 hours  levETIRAcetam  Solution 1000 milliGRAM(s) Oral daily  levETIRAcetam  Solution 250 milliGRAM(s) Oral <User Schedule>  midodrine. 40 milliGRAM(s) Oral <User Schedule>  norepinephrine Infusion 0.02 MICROgram(s)/kG/Min (2.49 mL/Hr) IV Continuous <Continuous>  pantoprazole  Injectable 40 milliGRAM(s) IV Push two times a day  petrolatum Ophthalmic Ointment 1 Application(s) Both EYES four times a day  sodium chloride 1 Gram(s) Oral two times a day  sodium chloride 3%  Inhalation 4 milliLiter(s) Inhalation every 6 hours    VITALS:  T(C): , Max: 37.3 (11-09-23 @ 20:00)  T(F): , Max: 99.2 (11-09-23 @ 20:00)  HR: 114 (11-10-23 @ 13:00)  BP: --  RR: 24 (11-10-23 @ 13:00)  SpO2: 99% (11-10-23 @ 13:00)    PHYSICAL EXAM:  Constitutional: critically ill, trach to vent   HEENT: + NGT, + tracheostomy   Respiratory: coarse BS  Cardiovascular: tachy   Gastrointestinal: BS+, soft, NT/ND  Extremities: ++ generalized edema  Neurological: UTO  : No Wheeler  Skin: No rashes  Access: [facility]     LABS:                        8.9    12.38 )-----------( 520      ( 10 Nov 2023 01:15 )             30.0     11-10    132<L>  |  98  |  35<H>  ----------------------------<  172<H>  5.0   |  24  |  1.42<H>    Ca    8.7      10 Nov 2023 01:15  Phos  5.6     11-10  Mg     2.50     11-10    TPro  6.7  /  Alb  2.0<L>  /  TBili  <0.2  /  DBili  x   /  AST  23  /  ALT  17  /  AlkPhos  314<H>  11-10

## 2023-11-10 NOTE — PROGRESS NOTE ADULT - SUBJECTIVE AND OBJECTIVE BOX
Subjective: Patient seen and examined. No new events except as noted.   remains in ICU     REVIEW OF SYSTEMS:  Unable to obtain      MEDICATIONS:  MEDICATIONS  (STANDING):  albuterol/ipratropium for Nebulization 3 milliLiter(s) Nebulizer every 6 hours  apixaban 5 milliGRAM(s) Oral every 12 hours  artificial tears (preservative free) Ophthalmic Solution 1 Drop(s) Both EYES every 4 hours  atorvastatin 10 milliGRAM(s) Oral at bedtime  calamine/zinc oxide Lotion 1 Application(s) Topical daily  chlorhexidine 0.12% Liquid 15 milliLiter(s) Oral Mucosa every 12 hours  chlorhexidine 2% Cloths 1 Application(s) Topical daily  dextrose 5%. 1000 milliLiter(s) (50 mL/Hr) IV Continuous <Continuous>  dextrose 5%. 1000 milliLiter(s) (100 mL/Hr) IV Continuous <Continuous>  dextrose 50% Injectable 12.5 Gram(s) IV Push once  dextrose 50% Injectable 25 Gram(s) IV Push once  dextrose 50% Injectable 25 Gram(s) IV Push once  dextrose 50% Injectable 25 Gram(s) IV Push once  droxidopa 600 milliGRAM(s) Oral every 8 hours  epoetin odalis (EPOGEN) Injectable 72084 Unit(s) IV Push <User Schedule>  ferrous    sulfate Liquid 300 milliGRAM(s) Oral daily  glucagon  Injectable 1 milliGRAM(s) IntraMuscular once  insulin lispro (ADMELOG) corrective regimen sliding scale   SubCutaneous every 6 hours  insulin NPH human recombinant 6 Unit(s) SubCutaneous every 6 hours  levETIRAcetam  Solution 1000 milliGRAM(s) Oral daily  levETIRAcetam  Solution 250 milliGRAM(s) Oral <User Schedule>  midodrine. 40 milliGRAM(s) Oral <User Schedule>  norepinephrine Infusion 0.02 MICROgram(s)/kG/Min (2.49 mL/Hr) IV Continuous <Continuous>  pantoprazole  Injectable 40 milliGRAM(s) IV Push two times a day  petrolatum Ophthalmic Ointment 1 Application(s) Both EYES four times a day  sodium chloride 1 Gram(s) Oral two times a day  sodium chloride 3%  Inhalation 4 milliLiter(s) Inhalation every 6 hours      PHYSICAL EXAM:  T(C): 37.1 (11-10-23 @ 12:00), Max: 37.3 (11-09-23 @ 20:00)  HR: 114 (11-10-23 @ 13:00) (104 - 123)  BP: --  RR: 24 (11-10-23 @ 13:00) (19 - 28)  SpO2: 99% (11-10-23 @ 13:00) (84% - 100%)  Wt(kg): --  I&O's Summary    09 Nov 2023 07:01  -  10 Nov 2023 07:00  --------------------------------------------------------  IN: 1290 mL / OUT: 100 mL / NET: 1190 mL    10 Nov 2023 07:01  -  10 Nov 2023 13:08  --------------------------------------------------------  IN: 80 mL / OUT: 0 mL / NET: 80 mL          Appearance: NAD, +trach  +NGT  HEENT: dry oral mucosa  Lymphatic: No lymphadenopathy  Cardiovascular: Normal S1 S2, No JVD, No murmurs, No edema  Respiratory: Decreased BS, + trach to vent	  Neuro: opens eyes  Gastrointestinal: Soft, Non-tender, + BS, + NGT  Skin: No rashes, No ecchymoses, No cyanosis	  Extremities: No strength/ROM 2/2 sedation, + BL LE edema  Vascular: Peripheral pulses palpable 2+ bilaterally  Anasarca   Mode: AC/ CMV (Assist Control/ Continuous Mandatory Ventilation), RR (machine): 24, FiO2: 40, PEEP: 8, ITime: 0.8, MAP: 19, PC: 35, PIP: 43      LABS:    CARDIAC MARKERS:                                8.9    12.38 )-----------( 520      ( 10 Nov 2023 01:15 )             30.0     11-10    132<L>  |  98  |  35<H>  ----------------------------<  172<H>  5.0   |  24  |  1.42<H>    Ca    8.7      10 Nov 2023 01:15  Phos  5.6     11-10  Mg     2.50     11-10    TPro  6.7  /  Alb  2.0<L>  /  TBili  <0.2  /  DBili  x   /  AST  23  /  ALT  17  /  AlkPhos  314<H>  11-10    proBNP:   Lipid Profile:   HgA1c:   TSH:             TELEMETRY: 	 SR    ECG:  	  RADIOLOGY:   DIAGNOSTIC TESTING:  [ ] Echocardiogram:  [ ]  Catheterization:  [ ] Stress Test:    OTHER:

## 2023-11-10 NOTE — PROGRESS NOTE ADULT - ASSESSMENT

## 2023-11-10 NOTE — PROGRESS NOTE ADULT - ASSESSMENT
IMPRESSION: 73F w/ HTN, DM2, CVA, breast CA-bilateral mastectomy, recurrent aspiration pneumonia/respiratory failure, and CKD, 8/11/23 p/w acute hypercapnic respiratory failure; c/b RUSS    (1)Renal - RUSS on CKD4 ==>now newly ESRD. S/p HD this morning     (2)Hyponatremia - Na+ improving with HD    (3)CV- tenuous hemodynamics such with each passing week we have more difficulty performing HD/achieving UF, persistent issue. BP stable with Jacki, though continues to need pressors for HD    (4)ID- BCx from 10/14/23 NGTD, BC (10/17) NGTD - s/p IV abx     (5)Pulm- trach/vent-dependent    (6)Anemia- hgb < goal though stable, epo with HD    (7)Hyperkalemia - improved with HD    RECOMMEND:  (1)HD today (completed): pressors and sodium modelling as needed to keep SBP>90; Epogen with HD   (2)Next HD Monday: pressors and sodium modelling as needed to keep SBP>90; Epogen with HD   (3)Dose new meds for GFR <10/HD    Nilda Espinoza, HEAVEN   [facility]  [phone]

## 2023-11-10 NOTE — CONSULT NOTE ADULT - SUBJECTIVE AND OBJECTIVE BOX
Interventional Radiology    Evaluate for Procedure: Tunneled dialysis catheter    HPI: 73y Female with HF, CKD now needing dialysis. Pt needing pressors during dialysis, and is currently intubated. Left IJ non-tunneled dialysis cathter placed by ICU on 10/24. Per ICU team, pt likely will require long ICU stay.    Allergies: isoniazid (Rash)  nafcillin (Unknown)  hydrALAZINE (Rash)  vitamin E (Short breath; Urticaria; Hives)  doxycycline (Rash)  cefepime (Rash)  NIFEdipine (Urticaria; Hives)  Zosyn (Rash)    Medications (Abx/Cardiac/Anticoagulation/Blood Products)    apixaban: 5 milliGRAM(s) Oral (11-10 @ 05:24)  droxidopa: 600 milliGRAM(s) Oral (11-10 @ 10:21)  midodrine: 40 milliGRAM(s) Oral (11-10 @ 07:50)  midodrine.: 40 milliGRAM(s) Oral (11-10 @ 12:01)    Data:    T(C): 37.1  HR: 114  BP: --  RR: 24  SpO2: 99%    -WBC 12.38 / HgB 8.9 / Hct 30.0 / Plt 520  -Na 132 / Cl 98 / BUN 35 / Glucose 172  -K 5.0 / CO2 24 / Cr 1.42  -ALT 17 / Alk Phos 314 / T.Bili <0.2  -INR -- / PTT 30.3    Radiology: Reviewed    Assessment: 73y Female with HF, CKD now needing dialysis. Pt needing pressors during dialysis, and is currently intubated. Left IJ non-tunneled dialysis cathter placed by ICU on 10/24. Per ICU team, pt likely will require long ICU stay.    Plan:   -Plan for tunneled dialysis catheter placement on non-dialysis day Tuesday 11/14/23.  -Please place order for IR Procedure, approving attending Dr. Love  -Please place pre-procedure note  -NPO past midnight prior to procedure  -hold therpaeutic/prophylactic anticoagulants  -AM CBC, BMP, and coags  -discussed with primary team    Jessica Rogel MD, PGY-3 Interventional Radiology  Available on [employer] Teams    - Non-emergent consults: Place IR consult order in [place]  - Emergent issues (pager): [facility] [phone]; [facility] [phone]; [phone]  - Scheduling questions: [facility] [phone]; [facility] [phone]  - Clinic/outpatient booking: [facility] [phone]; [facility] [phone]

## 2023-11-10 NOTE — PROGRESS NOTE ADULT - SUBJECTIVE AND OBJECTIVE BOX
Patient is a 73y old  Female who presents with a chief complaint of Respiratory distress (10 Nov 2023 13:15)      SUBJECTIVE / OVERNIGHT EVENTS: trache to vent, s/p hd today, pressure assisted, 2L removed    MEDICATIONS  (STANDING):  albuterol/ipratropium for Nebulization 3 milliLiter(s) Nebulizer every 6 hours  apixaban 5 milliGRAM(s) Oral every 12 hours  artificial tears (preservative free) Ophthalmic Solution 1 Drop(s) Both EYES every 4 hours  atorvastatin 10 milliGRAM(s) Oral at bedtime  calamine/zinc oxide Lotion 1 Application(s) Topical daily  chlorhexidine 0.12% Liquid 15 milliLiter(s) Oral Mucosa every 12 hours  chlorhexidine 2% Cloths 1 Application(s) Topical daily  dextrose 5%. 1000 milliLiter(s) (50 mL/Hr) IV Continuous <Continuous>  dextrose 5%. 1000 milliLiter(s) (100 mL/Hr) IV Continuous <Continuous>  dextrose 50% Injectable 12.5 Gram(s) IV Push once  dextrose 50% Injectable 25 Gram(s) IV Push once  dextrose 50% Injectable 25 Gram(s) IV Push once  dextrose 50% Injectable 25 Gram(s) IV Push once  droxidopa 600 milliGRAM(s) Oral every 8 hours  epoetin odalis (EPOGEN) Injectable 76421 Unit(s) IV Push <User Schedule>  ferrous    sulfate Liquid 300 milliGRAM(s) Oral daily  glucagon  Injectable 1 milliGRAM(s) IntraMuscular once  insulin lispro (ADMELOG) corrective regimen sliding scale   SubCutaneous every 6 hours  insulin NPH human recombinant 6 Unit(s) SubCutaneous every 6 hours  midodrine. 40 milliGRAM(s) Oral <User Schedule>  norepinephrine Infusion 0.02 MICROgram(s)/kG/Min (2.49 mL/Hr) IV Continuous <Continuous>  pantoprazole  Injectable 40 milliGRAM(s) IV Push two times a day  petrolatum Ophthalmic Ointment 1 Application(s) Both EYES four times a day  sodium chloride 1 Gram(s) Oral two times a day  sodium chloride 3%  Inhalation 4 milliLiter(s) Inhalation every 6 hours    MEDICATIONS  (PRN):  acetaminophen   Oral Liquid .. 650 milliGRAM(s) Oral every 6 hours PRN Temp greater or equal to 38C (100.4F), Mild Pain (1 - 3)  dextrose Oral Gel 15 Gram(s) Oral once PRN Blood Glucose LESS THAN 70 milliGRAM(s)/deciliter  diphenhydrAMINE Elixir 50 milliGRAM(s) Oral once PRN Rash and/or Itching  droxidopa 600 milliGRAM(s) Oral <User Schedule> PRN prior to hemodialysis  midodrine. 40 milliGRAM(s) Oral once PRN 1 Hour Pre HD  sodium chloride 0.9% Bolus. 250 milliLiter(s) IV Bolus every 5 minutes PRN SBP LESS THAN or EQUAL to 90 mmHg  sodium chloride 0.9% lock flush 10 milliLiter(s) IV Push every 1 hour PRN Pre/post blood products, medications, blood draw, and to maintain line patency      Vital Signs Last 24 Hrs  T(F): 97 (11-10-23 @ 12:50), Max: 99.2 (11-09-23 @ 20:00)  HR: 119 (11-10-23 @ 15:00) (104 - 123)  BP: --  RR: 29 (11-10-23 @ 15:00) (19 - 29)  SpO2: 91% (11-10-23 @ 15:00) (84% - 100%)  Telemetry:   CAPILLARY BLOOD GLUCOSE      POCT Blood Glucose.: 154 mg/dL (10 Nov 2023 11:44)  POCT Blood Glucose.: 199 mg/dL (10 Nov 2023 05:15)  POCT Blood Glucose.: 174 mg/dL (09 Nov 2023 23:54)  POCT Blood Glucose.: 204 mg/dL (09 Nov 2023 18:34)    I&O's Summary    09 Nov 2023 07:01  -  10 Nov 2023 07:00  --------------------------------------------------------  IN: 1290 mL / OUT: 100 mL / NET: 1190 mL    10 Nov 2023 07:01  -  10 Nov 2023 15:55  --------------------------------------------------------  IN: 680 mL / OUT: 2600 mL / NET: -1920 mL        PHYSICAL EXAM:  GENERAL: NAD, well-developed  HEAD:  Atraumatic, Normocephalic  EYES: EOMI, PERRLA, conjunctiva and sclera clear  NECK: Supple, No JVD  CHEST/LUNG: Clear to auscultation bilaterally; No wheeze  HEART: Regular rate and rhythm; No murmurs, rubs, or gallops  ABDOMEN: Soft, Nontender, Nondistended; Bowel sounds present  EXTREMITIES:  2+ Peripheral Pulses, No clubbing, cyanosis, or edema  PSYCH: AAOx3  NEUROLOGY: non-focal  SKIN: No rashes or lesions    LABS:                        8.9    12.38 )-----------( 520      ( 10 Nov 2023 01:15 )             30.0     11-10    132<L>  |  98  |  35<H>  ----------------------------<  172<H>  5.0   |  24  |  1.42<H>    Ca    8.7      10 Nov 2023 01:15  Phos  5.6     11-10  Mg     2.50     11-10    TPro  6.7  /  Alb  2.0<L>  /  TBili  <0.2  /  DBili  x   /  AST  23  /  ALT  17  /  AlkPhos  314<H>  11-10    PTT - ( 09 Nov 2023 00:31 )  PTT:30.3 sec      Urinalysis Basic - ( 10 Nov 2023 01:15 )    Color: x / Appearance: x / SG: x / pH: x  Gluc: 172 mg/dL / Ketone: x  / Bili: x / Urobili: x   Blood: x / Protein: x / Nitrite: x   Leuk Esterase: x / RBC: x / WBC x   Sq Epi: x / Non Sq Epi: x / Bacteria: x        RADIOLOGY & ADDITIONAL TESTS:    Imaging Personally Reviewed:    Consultant(s) Notes Reviewed:      Care Discussed with Consultants/Other Providers:

## 2023-11-10 NOTE — PROGRESS NOTE ADULT - ATTENDING COMMENTS
Patient examined and case reviewed in detail on bedside rounds  Critically ill and unstable on vent/pressors during HD despite high dose oral agents  Frequent bedside visits with therapy change today.   I have personally and independently provided 35+ minutes of critical care services; this excludes time spent on separate procedures or teaching

## 2023-11-10 NOTE — PROGRESS NOTE ADULT - SUBJECTIVE AND OBJECTIVE BOX
MICU Progress Note    INTERVAL HPI/OVERNIGHT EVENTS:    SUBJECTIVE: Patient seen and examined at bedside.     ROS: unable to assess    OBJECTIVE:    VITAL SIGNS:  ICU Vital Signs Last 24 Hrs  T(C): 37.3 (10 Nov 2023 04:00), Max: 37.3 (09 Nov 2023 08:00)  T(F): 99.1 (10 Nov 2023 04:00), Max: 99.2 (09 Nov 2023 20:00)  HR: 116 (10 Nov 2023 06:00) (109 - 123)  BP: --  BP(mean): --  ABP: 147/62 (10 Nov 2023 06:00) (112/37 - 147/62)  ABP(mean): 100 (10 Nov 2023 06:00) (68 - 100)  RR: 25 (10 Nov 2023 06:00) (19 - 27)  SpO2: 96% (10 Nov 2023 06:00) (84% - 100%)    O2 Parameters below as of 10 Nov 2023 04:04  Patient On (Oxygen Delivery Method): ventilator          Mode: AC/ CMV (Assist Control/ Continuous Mandatory Ventilation), RR (machine): 24, FiO2: 40, PEEP: 8, ITime: 0.8, MAP: 19, PC: 35, PIP: 43    11-09 @ 07:01  -  11-10 @ 07:00  --------------------------------------------------------  IN: 1290 mL / OUT: 100 mL / NET: 1190 mL      CAPILLARY BLOOD GLUCOSE      POCT Blood Glucose.: 199 mg/dL (10 Nov 2023 05:15)      PHYSICAL EXAM:  General: NAD  HEENT: NC/AT; PERRL, clear conjunctiva; L ear w/ grainy white residue, no bleeding appreciated  Neck: supple  Respiratory: CTA b/l  Cardiovascular: +S1/S2; RRR  Abdomen: soft, distended, warm to touch w/ area of erythema and induration RLQ  Extremities: WWP, 2+ peripheral pulses b/l; diffuse anasarca  Skin: normal color and turgor; no rash  Neurological: AO*0; rhythmic R facial and arm twitching    MEDICATIONS:  MEDICATIONS  (STANDING):  albuterol/ipratropium for Nebulization 3 milliLiter(s) Nebulizer every 6 hours  apixaban 5 milliGRAM(s) Oral every 12 hours  artificial tears (preservative free) Ophthalmic Solution 1 Drop(s) Both EYES every 4 hours  atorvastatin 10 milliGRAM(s) Oral at bedtime  calamine/zinc oxide Lotion 1 Application(s) Topical daily  chlorhexidine 0.12% Liquid 15 milliLiter(s) Oral Mucosa every 12 hours  chlorhexidine 2% Cloths 1 Application(s) Topical daily  dextrose 5%. 1000 milliLiter(s) (50 mL/Hr) IV Continuous <Continuous>  dextrose 5%. 1000 milliLiter(s) (100 mL/Hr) IV Continuous <Continuous>  dextrose 50% Injectable 12.5 Gram(s) IV Push once  dextrose 50% Injectable 25 Gram(s) IV Push once  dextrose 50% Injectable 25 Gram(s) IV Push once  dextrose 50% Injectable 25 Gram(s) IV Push once  droxidopa 600 milliGRAM(s) Oral every 8 hours  epoetin odalis (EPOGEN) Injectable 21372 Unit(s) IV Push <User Schedule>  ferrous    sulfate Liquid 300 milliGRAM(s) Oral daily  glucagon  Injectable 1 milliGRAM(s) IntraMuscular once  insulin lispro (ADMELOG) corrective regimen sliding scale   SubCutaneous every 6 hours  insulin NPH human recombinant 6 Unit(s) SubCutaneous every 6 hours  levETIRAcetam  Solution 1000 milliGRAM(s) Oral daily  levETIRAcetam  Solution 250 milliGRAM(s) Oral <User Schedule>  midodrine. 40 milliGRAM(s) Oral <User Schedule>  pantoprazole  Injectable 40 milliGRAM(s) IV Push two times a day  petrolatum Ophthalmic Ointment 1 Application(s) Both EYES four times a day  sodium chloride 1 Gram(s) Oral two times a day  sodium chloride 3%  Inhalation 4 milliLiter(s) Inhalation every 6 hours    MEDICATIONS  (PRN):  acetaminophen   Oral Liquid .. 650 milliGRAM(s) Oral every 6 hours PRN Temp greater or equal to 38C (100.4F), Mild Pain (1 - 3)  dextrose Oral Gel 15 Gram(s) Oral once PRN Blood Glucose LESS THAN 70 milliGRAM(s)/deciliter  diphenhydrAMINE Elixir 50 milliGRAM(s) Oral once PRN Rash and/or Itching  droxidopa 600 milliGRAM(s) Oral <User Schedule> PRN prior to hemodialysis  midodrine. 40 milliGRAM(s) Oral once PRN 1 Hour Pre HD  sodium chloride 0.9% Bolus. 250 milliLiter(s) IV Bolus every 5 minutes PRN SBP LESS THAN or EQUAL to 90 mmHg  sodium chloride 0.9% lock flush 10 milliLiter(s) IV Push every 1 hour PRN Pre/post blood products, medications, blood draw, and to maintain line patency      ALLERGIES:  Allergies    isoniazid (Rash)  nafcillin (Unknown)  hydrALAZINE (Rash)  vitamin E (Short breath; Urticaria; Hives)  doxycycline (Rash)  cefepime (Rash)  NIFEdipine (Urticaria; Hives)  Zosyn (Rash)    Intolerances        LABS:                        8.9    12.38 )-----------( 520      ( 10 Nov 2023 01:15 )             30.0     11-10    132<L>  |  98  |  35<H>  ----------------------------<  172<H>  5.0   |  24  |  1.42<H>    Ca    8.7      10 Nov 2023 01:15  Phos  5.6     11-10  Mg     2.50     11-10    TPro  6.7  /  Alb  2.0<L>  /  TBili  <0.2  /  DBili  x   /  AST  23  /  ALT  17  /  AlkPhos  314<H>  11-10    PTT - ( 09 Nov 2023 00:31 )  PTT:30.3 sec  Urinalysis Basic - ( 10 Nov 2023 01:15 )    Color: x / Appearance: x / SG: x / pH: x  Gluc: 172 mg/dL / Ketone: x  / Bili: x / Urobili: x   Blood: x / Protein: x / Nitrite: x   Leuk Esterase: x / RBC: x / WBC x   Sq Epi: x / Non Sq Epi: x / Bacteria: x

## 2023-11-10 NOTE — PROGRESS NOTE ADULT - SUBJECTIVE AND OBJECTIVE BOX
S:  Pt seen and examined.       Medications: MEDICATIONS  (STANDING):  albuterol/ipratropium for Nebulization 3 milliLiter(s) Nebulizer every 6 hours  apixaban 5 milliGRAM(s) Oral every 12 hours  artificial tears (preservative free) Ophthalmic Solution 1 Drop(s) Both EYES every 4 hours  atorvastatin 10 milliGRAM(s) Oral at bedtime  calamine/zinc oxide Lotion 1 Application(s) Topical daily  chlorhexidine 0.12% Liquid 15 milliLiter(s) Oral Mucosa every 12 hours  chlorhexidine 2% Cloths 1 Application(s) Topical daily  dextrose 5%. 1000 milliLiter(s) (100 mL/Hr) IV Continuous <Continuous>  dextrose 5%. 1000 milliLiter(s) (50 mL/Hr) IV Continuous <Continuous>  dextrose 50% Injectable 12.5 Gram(s) IV Push once  dextrose 50% Injectable 25 Gram(s) IV Push once  dextrose 50% Injectable 25 Gram(s) IV Push once  dextrose 50% Injectable 25 Gram(s) IV Push once  droxidopa 600 milliGRAM(s) Oral every 8 hours  epoetin odalis (EPOGEN) Injectable 64487 Unit(s) IV Push <User Schedule>  ferrous    sulfate Liquid 300 milliGRAM(s) Oral daily  glucagon  Injectable 1 milliGRAM(s) IntraMuscular once  insulin lispro (ADMELOG) corrective regimen sliding scale   SubCutaneous every 6 hours  insulin NPH human recombinant 6 Unit(s) SubCutaneous every 6 hours  levETIRAcetam  Solution 1000 milliGRAM(s) Oral daily  levETIRAcetam  Solution 250 milliGRAM(s) Oral <User Schedule>  midodrine. 40 milliGRAM(s) Oral <User Schedule>  norepinephrine Infusion 0.02 MICROgram(s)/kG/Min (2.49 mL/Hr) IV Continuous <Continuous>  pantoprazole  Injectable 40 milliGRAM(s) IV Push two times a day  petrolatum Ophthalmic Ointment 1 Application(s) Both EYES four times a day  sodium chloride 1 Gram(s) Oral two times a day  sodium chloride 3%  Inhalation 4 milliLiter(s) Inhalation every 6 hours    MEDICATIONS  (PRN):  acetaminophen   Oral Liquid .. 650 milliGRAM(s) Oral every 6 hours PRN Temp greater or equal to 38C (100.4F), Mild Pain (1 - 3)  dextrose Oral Gel 15 Gram(s) Oral once PRN Blood Glucose LESS THAN 70 milliGRAM(s)/deciliter  diphenhydrAMINE Elixir 50 milliGRAM(s) Oral once PRN Rash and/or Itching  droxidopa 600 milliGRAM(s) Oral <User Schedule> PRN prior to hemodialysis  midodrine. 40 milliGRAM(s) Oral once PRN 1 Hour Pre HD  sodium chloride 0.9% Bolus. 250 milliLiter(s) IV Bolus every 5 minutes PRN SBP LESS THAN or EQUAL to 90 mmHg  sodium chloride 0.9% lock flush 10 milliLiter(s) IV Push every 1 hour PRN Pre/post blood products, medications, blood draw, and to maintain line patency       Vitals:  Vital Signs Last 24 Hrs  T(C): 37.1 (10 Nov 2023 12:00), Max: 37.3 (09 Nov 2023 20:00)  T(F): 98.7 (10 Nov 2023 12:00), Max: 99.2 (09 Nov 2023 20:00)  HR: 118 (10 Nov 2023 12:00) (104 - 123)  BP: --  BP(mean): --  RR: 19 (10 Nov 2023 12:00) (19 - 28)  SpO2: 100% (10 Nov 2023 12:00) (84% - 100%)    Parameters below as of 10 Nov 2023 09:30  Patient On (Oxygen Delivery Method): ventilator            LABS:                          8.9    12.38 )-----------( 520      ( 10 Nov 2023 01:15 )             30.0     11-10    132<L>  |  98  |  35<H>  ----------------------------<  172<H>  5.0   |  24  |  1.42<H>    Ca    8.7      10 Nov 2023 01:15  Phos  5.6     11-10  Mg     2.50     11-10    TPro  6.7  /  Alb  2.0<L>  /  TBili  <0.2  /  DBili  x   /  AST  23  /  ALT  17  /  AlkPhos  314<H>  11-10    LIVER FUNCTIONS - ( 10 Nov 2023 01:15 )  Alb: 2.0 g/dL / Pro: 6.7 g/dL / ALK PHOS: 314 U/L / ALT: 17 U/L / AST: 23 U/L / GGT: x           PTT - ( 09 Nov 2023 00:31 )  PTT:30.3 sec  Urinalysis Basic - ( 10 Nov 2023 01:15 )    Color: x / Appearance: x / SG: x / pH: x  Gluc: 172 mg/dL / Ketone: x  / Bili: x / Urobili: x   Blood: x / Protein: x / Nitrite: x   Leuk Esterase: x / RBC: x / WBC x   Sq Epi: x / Non Sq Epi: x / Bacteria: x            Neurological Exam:  Mental Status: Eyes closed, minimal spont eye opening off sedation. Does not follow commands,  + trach to vent and NGT   Cranial Nerves: PERRL slightly sluggish, R facial twitching noted by mouth lessened today- ? suppressible. occasional head dystonia  Motor: Moves upper extremities spontaneously R >L, tremor R > L  no movement noted in lowers  Sensation: WD to noxious x4    I personally reviewed the below data/images/labs:        < from: CT Head No Cont (08.15.23 @ 17:20) >    ACC: [id number] EXAM:  CT BRAIN   ORDERED BY: MINAL SAM     PROCEDURE DATE:  08/15/2023          INTERPRETATION:  Clinical indication: Change in neuro exam.    Multiple axial sections were performed from base to vertex without   contrast enhancement. Coronal and sagittal reconstructions were   reformatted well.    This exam is compared prior head CT performed on August 11, 2023    Parenchymal volume loss and chronic microvessel ischemic changes are   again seen.    Abnormal low-attenuation involving the left occipital cortical   subcortical region is again seen. 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    No evidence of acute hemorrhage mass or mass effect is seen.    Evaluation of the osseous structures with appropriate window demonstrates   sclerotic changes about the left mastoid region which appears stable.   Opacification left middle ear region is again seen.    Patient is status post bilateral cataract surgery.    Impression: Stable exam.    < end of copied text >    vEEG:    Clinical Impression:  - Severe diffuse non-specific cerebral dysfunction  - There were no epileptiform abnormalities or seizures recorded.        CTH 8/11:    VENTRICLES AND SULCI: Age-appropriate involutional change  INTRA-AXIAL:  Old left PCA infarct as seen on the prior unchanged.   Microvascular ischemic changes involving the periventricular and   subcortical white matter as seen previously  EXTRA-AXIAL:  No mass or collection is seen.  VISUALIZED SINUSES:  Clear.  VISUALIZED MASTOIDS: Left mastoid sclerosis  CALVARIUM: Infiltrative appearance tothe calvarium may be indicative of   marrow infiltration on the basis of patient's known diagnosis of breast   cancer. MISCELLANEOUS:  None.    IMPRESSION:  No significant interval change compared with 7/17/2023 in   left PCA infarct which is old. Microvascular ischemic changes involving   the periventricular and subcortical white matter as seen   previously.Questionable lesions at the level of the calvarium related to   possible breast CA. Clinical correlation recommended.    --- End of Report ---      ct< from: CT Head No Cont (09.26.23 @ 21:02) >  IMPRESSION: Vague questionable areas of hypoattenuation within the   bilateral cerebellar hemispheres which may be compatible with   acute/subacute ischemia. Recommend further evaluation with a brain MRI   study, provided there are no MRI contraindications.    No acute intracranial hemorrhage.    Previously seen questionable vague wedge-shaped area of hypoattenuation   in the left parietal lobe with artifactual secondary to motion and volume   averaging with a prominent sulcus.    Chronic left occipital lobe infarct.    < end of copied text >    < from: CT Head No Cont (10.03.23 @ 20:46) >    IMPRESSION:    No acute intracranial hemorrhage or mass effect. Evaluation of the   posterior fossa degraded by streak artifact from dental amalgam.   Lucencies in the bilateral cerebellum may be artifactual.    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    Bilateral middle ear and mastoid effusions which are also seen on prior   exam.    EEG Classification / Summary:  Abnormal EEG in the awake, drowsy states.   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   -No seizures

## 2023-11-11 NOTE — PROGRESS NOTE ADULT - ASSESSMENT
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       NEUROLOGY  #AMS  Multiple etiologies including active infection vs CVA.   MR head performed w/ new infarct and old infarcts, EEG without seizure events but with high foci potential   - f/u spot EEG given facial twitching: negative for epileptiform activity (10/31)  - deferring repeat MRI as unlikely to   - continue lipitor  - continue keppra given above  - aspirin held 10/11, likely in s/o GIB, Hgb stable not requiring pRBC since 10/14      CARDIOVASCULAR  # Septic vs vasoplegic shock   Etiology likely septic iso active infection. Possible component of vasoplegic, however no longer with active infection or on sedation. Off IV vasopressors.   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   - resolved    RESPIRATORY  # AHRF second to volume overload   - CKD vs HFpEF w/ hypercarbia 2/2 volume overload requiring intubation, trach, and HD initiation  - Continue on albuterol and chest PT  - Continue volume removal with HD      #tracheomalacia   - CT CHEST (9/8) with tracheomalacia, BL pleural effusions and continued consolidations     GI  # UGIB: last pRBC transfused 11/4 (10/14 before that)   - Continue on PPI BID    # Dysphagia   - NGT-TF     RENAL  # ESRD  - HD MWF TIW with midodrine and droxidopa prior  - ICU for vasopressor assisted volume removal, cap to 1 pressor and not offering CRRT due to comorbidities and prognosis   - f/u w/ nephrology: will continue to offer patient 1-pressor assisted HD as agreed prior, as long as she can tolerate HD maxed on 1 pressor, she will be considered iHD candidate  - UF -2.5L attempted 10/30, reached 1.7L limited by hypotension and tachycardia to 130s, able to remove 2L 11/1, 2L 11/3, 2L 11/6,     INFECTIOUS DISEASE  #Sepsis: temp 101.2 on 11/2; sources include skin (poorly demarcated area of induration/erythema, enlarging on RLQ), pulm (copious sputum)  - Bcx, sputum cx sent  - repeat MRSA PCR neg  - ID re-consulted: abd edema likely 2/2 chronic panniculitis  - Abx:      --> Vanc (11/2) - MRSA neg, dc'd     --> Aztreo 2g (11/2) --> dc'd, 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 Coli sputum Cx, BAL w/ MSSA  - treated with abx   - eosinophilia workup: JORGE, ANCAs negative    # MAC   - outpatient treatment    HEME  # Anemia second to GIB vs renal disease   - multiple transfusions, last done 11/4  - management as per renal, PPI    #Allergic transfusion reaction: per RCU documentation, developed erythematous rash across chest shortly after starting transfusion, blood bank consulted and diagnosed mild allergic rxn  - premedicate w/ benadryl and famotidine prior to future transfusions    VASCULAR   # LLE POP DVT   - on Eliquis    # HD access  - TRISHA TAYLOR (9/27 - 10/21)  - TRISHA taylor replaced for HD     ONC   # Hx of Breast CA   - Patient dx in 2018 and s/p BL mastectomy (radical on right) and chemo and RT.     ENDOCRINE  # IDDM2   - Continued on NPH with ISS, however noted with hypoglycemic episodes and NPH dc'ed.    - Finger sticks trending up off NPH.   - Continue on NPH 6U with moderate ISS.   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       NEUROLOGY  #AMS  Multiple etiologies including active infection vs CVA.   MR head performed w/ new infarct and old infarcts, EEG without seizure events but with high foci potential   - f/u spot EEG given facial twitching: negative for epileptiform activity (10/31)  - deferring repeat MRI as unlikely to   - continue lipitor  - keppra stopped 11/10 (was on as seizure ppx given multiple infarcts, no seizures on multiple EEG, stopped to ensure it is not contributing to sedation)  - aspirin held 10/11, likely in s/o GIB, Hgb stable not requiring pRBC since 10/14      CARDIOVASCULAR  # Septic vs vasoplegic shock   Etiology likely septic iso active infection. Possible component of vasoplegic, however no longer with active infection or on sedation. Off IV vasopressors.   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   - resolved    RESPIRATORY  # AHRF second to volume overload   - CKD vs HFpEF w/ hypercarbia 2/2 volume overload requiring intubation, trach, and HD initiation  - Continue on albuterol and chest PT  - Continue volume removal with HD      #tracheomalacia   - CT CHEST (9/8) with tracheomalacia, BL pleural effusions and continued consolidations     GI  # UGIB: last pRBC transfused 11/4 (10/14 before that)   - Continue on PPI BID    # Dysphagia   - NGT-TF     RENAL  # ESRD  - HD MWF TIW with midodrine and droxidopa prior  - ICU for vasopressor assisted volume removal, cap to 1 pressor and not offering CRRT due to comorbidities and prognosis   - f/u w/ nephrology: will continue to offer patient 1-pressor assisted HD as agreed prior, as long as she can tolerate HD maxed on 1 pressor, she will be considered iHD candidate  - UF -2.5L w/ HD; tolerates w/ norepi gtt  - IR planning permacath 11/14; procedure note and order placed, eliquis to be held    INFECTIOUS DISEASE  #Sepsis: temp 101.2 on 11/2; sources include skin (poorly demarcated area of induration/erythema, enlarging on RLQ), pulm (copious sputum)  - Bcx, sputum cx sent  - repeat MRSA PCR neg  - ID re-consulted: abd edema likely 2/2 chronic panniculitis  - Abx:      --> Vanc (11/2) - MRSA neg, dc'd     --> Aztreo 2g (11/2) --> dc'd, 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 Coli sputum Cx, BAL w/ MSSA  - treated with abx   - eosinophilia workup: JORGE, ANCAs negative    # MAC   - outpatient treatment    HEME  # Anemia second to GIB vs renal disease   - multiple transfusions, last done 11/4    #Allergic transfusion reaction: per RCU documentation, developed erythematous rash across chest shortly after starting transfusion, blood bank consulted and diagnosed mild allergic rxn  - premedicate w/ benadryl and famotidine prior to future transfusions    VASCULAR   # LLE POP DVT   - on Eliquis    # HD access  - TRISHA TAYLOR (9/27 - 10/21)  - LIZKYRA pearsonfabi replaced for HD     ONC   # Hx of Breast CA   - Patient dx in 2018 and s/p BL mastectomy (radical on right) and chemo and RT.     ENDOCRINE  # IDDM2   - Continued on NPH with ISS, however noted with hypoglycemic episodes and NPH dc'ed.    - Finger sticks trending up off NPH.   - Continue on NPH 6U with moderate ISS.   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       NEUROLOGY  #AMS  Multiple etiologies including active infection vs CVA.   MR head performed w/ new infarct and old infarcts, EEG without seizure events but with high foci potential   - f/u spot EEG given facial twitching: negative for epileptiform activity (10/31)  - deferring repeat MRI as unlikely to   - continue lipitor  - keppra stopped 11/10 (was on as seizure ppx given multiple infarcts, no seizures on multiple EEG, stopped to ensure it is not contributing to sedation)  - aspirin held 10/11, likely in s/o GIB, Hgb stable not requiring pRBC since 11/4      CARDIOVASCULAR  # Septic vs vasoplegic shock   Etiology likely septic iso active infection. Possible component of vasoplegic, however no longer with active infection or on sedation. Off IV vasopressors.   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   - resolved    RESPIRATORY  # AHRF second to volume overload   - CKD vs HFpEF w/ hypercarbia 2/2 volume overload requiring intubation, trach, and HD initiation  - Continue on albuterol and chest PT  - Continue volume removal with HD      #tracheomalacia   - CT CHEST (9/8) with tracheomalacia, BL pleural effusions and continued consolidations     GI  # UGIB: last pRBC transfused 11/4 (10/14 before that)   - Continue on PPI BID    # Dysphagia   - NGT-TF     RENAL  # ESRD  - HD MWF TIW with midodrine and droxidopa prior  - ICU for vasopressor assisted volume removal, cap to 1 pressor and not offering CRRT due to comorbidities and prognosis   - f/u w/ nephrology: will continue to offer patient 1-pressor assisted HD as agreed prior, as long as she can tolerate HD maxed on 1 pressor, she will be considered iHD candidate  - UF -2.5L w/ HD; tolerates w/ norepi gtt  - IR planning permacath 11/14; procedure note and order placed, eliquis held (last dose 11/11 PM)    INFECTIOUS DISEASE  #Sepsis: temp 101.2 on 11/2; sources include skin (poorly demarcated area of induration/erythema, enlarging on RLQ), pulm (copious sputum)  - Bcx, sputum cx sent  - repeat MRSA PCR neg  - ID re-consulted: abd edema likely 2/2 chronic panniculitis  - Abx:      --> Vanc (11/2) - MRSA neg, dc'd     --> Aztreo 2g (11/2) --> dc'd, 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 Coli sputum Cx, BAL w/ MSSA  - treated with abx   - eosinophilia workup: JORGE, ANCAs negative    HEME  # Anemia second to GIB vs renal disease   - multiple transfusions, last done 11/4    #Allergic transfusion reaction: per RCU documentation, developed erythematous rash across chest shortly after starting transfusion, blood bank consulted and diagnosed mild allergic rxn  - premedicate w/ benadryl and famotidine prior to future transfusions  - no issue w/ pRBC transfusion 11/4    VASCULAR   # LLE POP DVT   - on Eliquis (held as of 11/11 PM for procedure), resume when able    # HD access  - TRISHA TAYLOR (9/27 - 10/21)  - TRISHA taylor replaced for HD (10/21 - )  - Permacath planned (11/14 - )    ONC   # Hx of Breast CA   - Patient dx in 2018 and s/p BL mastectomy (radical on right) and chemo and RT.     ENDOCRINE  # IDDM2   - Continued on NPH with ISS, however noted with hypoglycemic episodes and NPH dc'ed.    - Finger sticks trending up off NPH.   - Continue on NPH 6U with moderate ISS.   - Adjust as need       ETHICS/ GOC    - Attempted palliative discussion however family not interested and wishes for FULL CODE  - Family continues to want everything done despite inability to tolerate HD on multiple oral pressor  - Levo capped at 0.3 during HD

## 2023-11-11 NOTE — PROGRESS NOTE ADULT - SUBJECTIVE AND OBJECTIVE BOX
Subjective: Patient seen and examined. No new events except as noted.   remains in ICU   plan for tunneled dialysis catheter    REVIEW OF SYSTEMS:    Unable to obtain      MEDICATIONS:  MEDICATIONS  (STANDING):  albuterol/ipratropium for Nebulization 3 milliLiter(s) Nebulizer every 6 hours  artificial tears (preservative free) Ophthalmic Solution 1 Drop(s) Both EYES every 4 hours  atorvastatin 10 milliGRAM(s) Oral at bedtime  calamine/zinc oxide Lotion 1 Application(s) Topical daily  chlorhexidine 0.12% Liquid 15 milliLiter(s) Oral Mucosa every 12 hours  chlorhexidine 2% Cloths 1 Application(s) Topical daily  dextrose 5%. 1000 milliLiter(s) (50 mL/Hr) IV Continuous <Continuous>  dextrose 5%. 1000 milliLiter(s) (100 mL/Hr) IV Continuous <Continuous>  dextrose 50% Injectable 12.5 Gram(s) IV Push once  dextrose 50% Injectable 25 Gram(s) IV Push once  dextrose 50% Injectable 25 Gram(s) IV Push once  dextrose 50% Injectable 25 Gram(s) IV Push once  droxidopa 600 milliGRAM(s) Oral every 8 hours  epoetin odalis (EPOGEN) Injectable 91457 Unit(s) IV Push <User Schedule>  ferrous    sulfate Liquid 300 milliGRAM(s) Oral daily  glucagon  Injectable 1 milliGRAM(s) IntraMuscular once  insulin lispro (ADMELOG) corrective regimen sliding scale   SubCutaneous every 6 hours  insulin NPH human recombinant 6 Unit(s) SubCutaneous every 6 hours  midodrine. 40 milliGRAM(s) Oral <User Schedule>  pantoprazole  Injectable 40 milliGRAM(s) IV Push two times a day  petrolatum Ophthalmic Ointment 1 Application(s) Both EYES four times a day  sodium chloride 1 Gram(s) Oral two times a day  sodium chloride 3%  Inhalation 4 milliLiter(s) Inhalation every 6 hours      PHYSICAL EXAM:  T(C): 37.3 (11-11-23 @ 16:00), Max: 37.6 (11-10-23 @ 20:00)  HR: 121 (11-11-23 @ 17:00) (108 - 123)  BP: --  RR: 24 (11-11-23 @ 17:00) (19 - 24)  SpO2: 100% (11-11-23 @ 17:00) (93% - 100%)  Wt(kg): --  I&O's Summary    10 Nov 2023 07:01  -  11 Nov 2023 07:00  --------------------------------------------------------  IN: 1650 mL / OUT: 2750 mL / NET: -1100 mL    11 Nov 2023 07:01  -  11 Nov 2023 18:58  --------------------------------------------------------  IN: 510 mL / OUT: 0 mL / NET: 510 mL          Appearance: NAD, +trach  +NGT  HEENT: dry oral mucosa  Lymphatic: No lymphadenopathy  Cardiovascular: Normal S1 S2, No JVD, No murmurs, No edema  Respiratory: Decreased BS, + trach to vent	  Neuro: opens eyes  Gastrointestinal: Soft, Non-tender, + BS, + NGT  Skin: No rashes, No ecchymoses, No cyanosis	  Extremities: No strength/ROM 2/2 sedation, + BL LE edema  Vascular: Peripheral pulses palpable 2+ bilaterally  Anasarca     Mode: AC/ CMV (Assist Control/ Continuous Mandatory Ventilation), RR (machine): 24, FiO2: 40, PEEP: 8, ITime: 0.8, MAP: 20, PC: 35, PIP: 43        LABS:    CARDIAC MARKERS:                                9.1    13.05 )-----------( 485      ( 11 Nov 2023 00:25 )             31.7     11-11    136  |  102  |  30<H>  ----------------------------<  177<H>  4.8   |  25  |  1.18    Ca    8.7      11 Nov 2023 00:25  Phos  4.7     11-11  Mg     2.40     11-11    TPro  6.7  /  Alb  1.9<L>  /  TBili  <0.2  /  DBili  x   /  AST  29  /  ALT  19  /  AlkPhos  352<H>  11-11          TELEMETRY: 	  SR  ECG:  	  RADIOLOGY:   DIAGNOSTIC TESTING:  [ ] Echocardiogram:  [ ]  Catheterization:  [ ] Stress Test:    OTHER:

## 2023-11-11 NOTE — PROGRESS NOTE ADULT - SUBJECTIVE AND OBJECTIVE BOX
Patient is a 73y old  Female who presents with a chief complaint of Respiratory distress (11 Nov 2023 18:58)      SUBJECTIVE / OVERNIGHT EVENTS: trache to vent, HD catheter planned for 11/14 in IR, cont pressor assisted HD on MWF,     MEDICATIONS  (STANDING):  albuterol/ipratropium for Nebulization 3 milliLiter(s) Nebulizer every 6 hours  artificial tears (preservative free) Ophthalmic Solution 1 Drop(s) Both EYES every 4 hours  atorvastatin 10 milliGRAM(s) Oral at bedtime  calamine/zinc oxide Lotion 1 Application(s) Topical daily  chlorhexidine 0.12% Liquid 15 milliLiter(s) Oral Mucosa every 12 hours  chlorhexidine 2% Cloths 1 Application(s) Topical daily  dextrose 5%. 1000 milliLiter(s) (50 mL/Hr) IV Continuous <Continuous>  dextrose 5%. 1000 milliLiter(s) (100 mL/Hr) IV Continuous <Continuous>  dextrose 50% Injectable 12.5 Gram(s) IV Push once  dextrose 50% Injectable 25 Gram(s) IV Push once  dextrose 50% Injectable 25 Gram(s) IV Push once  dextrose 50% Injectable 25 Gram(s) IV Push once  droxidopa 600 milliGRAM(s) Oral every 8 hours  epoetin odalis (EPOGEN) Injectable 04758 Unit(s) IV Push <User Schedule>  ferrous    sulfate Liquid 300 milliGRAM(s) Oral daily  glucagon  Injectable 1 milliGRAM(s) IntraMuscular once  insulin lispro (ADMELOG) corrective regimen sliding scale   SubCutaneous every 6 hours  insulin NPH human recombinant 6 Unit(s) SubCutaneous every 6 hours  midodrine. 40 milliGRAM(s) Oral <User Schedule>  pantoprazole  Injectable 40 milliGRAM(s) IV Push two times a day  petrolatum Ophthalmic Ointment 1 Application(s) Both EYES four times a day  sodium chloride 1 Gram(s) Oral two times a day  sodium chloride 3%  Inhalation 4 milliLiter(s) Inhalation every 6 hours    MEDICATIONS  (PRN):  acetaminophen   Oral Liquid .. 650 milliGRAM(s) Oral every 6 hours PRN Temp greater or equal to 38C (100.4F), Mild Pain (1 - 3)  dextrose Oral Gel 15 Gram(s) Oral once PRN Blood Glucose LESS THAN 70 milliGRAM(s)/deciliter  diphenhydrAMINE Elixir 50 milliGRAM(s) Oral once PRN Rash and/or Itching  droxidopa 600 milliGRAM(s) Oral <User Schedule> PRN prior to hemodialysis  midodrine. 40 milliGRAM(s) Oral once PRN 1 Hour Pre HD  sodium chloride 0.9% Bolus. 250 milliLiter(s) IV Bolus every 5 minutes PRN SBP LESS THAN or EQUAL to 90 mmHg  sodium chloride 0.9% lock flush 10 milliLiter(s) IV Push every 1 hour PRN Pre/post blood products, medications, blood draw, and to maintain line patency      Vital Signs Last 24 Hrs  T(F): 99.1 (11-11-23 @ 16:00), Max: 99.7 (11-11-23 @ 00:00)  HR: 114 (11-11-23 @ 19:16) (108 - 123)  BP: 122/47  RR: 24 (11-11-23 @ 19:00) (19 - 24)  SpO2: 100% (11-11-23 @ 19:16) (93% - 100%)  Telemetry:   CAPILLARY BLOOD GLUCOSE      POCT Blood Glucose.: 181 mg/dL (11 Nov 2023 17:50)  POCT Blood Glucose.: 222 mg/dL (11 Nov 2023 12:00)  POCT Blood Glucose.: 179 mg/dL (11 Nov 2023 05:48)  POCT Blood Glucose.: 168 mg/dL (10 Nov 2023 23:34)    I&O's Summary    10 Nov 2023 07:01  -  11 Nov 2023 07:00  --------------------------------------------------------  IN: 1650 mL / OUT: 2750 mL / NET: -1100 mL    11 Nov 2023 07:01  -  11 Nov 2023 19:44  --------------------------------------------------------  IN: 630 mL / OUT: 200 mL / NET: 430 mL        PHYSICAL EXAM:  GENERAL: NAD, well-developed  HEAD:  Atraumatic, Normocephalic  EYES: EOMI, PERRLA, conjunctiva and sclera clear  NECK: Supple, No JVD  CHEST/LUNG: Clear to auscultation bilaterally; No wheeze  HEART: Regular rate and rhythm; No murmurs, rubs, or gallops  ABDOMEN: Soft, Nontender, Nondistended; Bowel sounds present  EXTREMITIES:  2+ Peripheral Pulses, No clubbing, cyanosis, or edema  PSYCH: AAOx3  NEUROLOGY: non-focal  SKIN: No rashes or lesions    LABS:                        9.1    13.05 )-----------( 485      ( 11 Nov 2023 00:25 )             31.7     11-11    136  |  102  |  30<H>  ----------------------------<  177<H>  4.8   |  25  |  1.18    Ca    8.7      11 Nov 2023 00:25  Phos  4.7     11-11  Mg     2.40     11-11    TPro  6.7  /  Alb  1.9<L>  /  TBili  <0.2  /  DBili  x   /  AST  29  /  ALT  19  /  AlkPhos  352<H>  11-11          Urinalysis Basic - ( 11 Nov 2023 00:25 )    Color: x / Appearance: x / SG: x / pH: x  Gluc: 177 mg/dL / Ketone: x  / Bili: x / Urobili: x   Blood: x / Protein: x / Nitrite: x   Leuk Esterase: x / RBC: x / WBC x   Sq Epi: x / Non Sq Epi: x / Bacteria: x

## 2023-11-11 NOTE — PROGRESS NOTE ADULT - SUBJECTIVE AND OBJECTIVE BOX
MICU Progress Note    INTERVAL HPI/OVERNIGHT EVENTS:    SUBJECTIVE: Patient seen and examined at bedside.     ROS: unable to assess    OBJECTIVE:    VITAL SIGNS:  ICU Vital Signs Last 24 Hrs  T(C): 37.3 (11 Nov 2023 04:00), Max: 37.6 (10 Nov 2023 20:00)  T(F): 99.2 (11 Nov 2023 04:00), Max: 99.7 (11 Nov 2023 00:00)  HR: 119 (11 Nov 2023 06:00) (104 - 123)  BP: --  BP(mean): --  ABP: 123/47 (11 Nov 2023 06:00) (78/31 - 142/63)  ABP(mean): 79 (11 Nov 2023 06:00) (49 - 97)  RR: 24 (11 Nov 2023 06:00) (19 - 29)  SpO2: 98% (11 Nov 2023 06:00) (91% - 100%)    O2 Parameters below as of 11 Nov 2023 03:51  Patient On (Oxygen Delivery Method): ventilator          Mode: AC/ CMV (Assist Control/ Continuous Mandatory Ventilation), RR (machine): 24, FiO2: 40, PEEP: 8, ITime: 0.8, MAP: 20, PC: 35, PIP: 43    11-10 @ 07:01  -  11-11 @ 07:00  --------------------------------------------------------  IN: 1610 mL / OUT: 2600 mL / NET: -990 mL      CAPILLARY BLOOD GLUCOSE      POCT Blood Glucose.: 179 mg/dL (11 Nov 2023 05:48)      PHYSICAL EXAM:  General: NAD  HEENT: NC/AT; PERRL, clear conjunctiva; L ear w/ grainy white residue, no bleeding appreciated  Neck: supple  Respiratory: CTA b/l  Cardiovascular: +S1/S2; RRR  Abdomen: soft, distended, warm to touch w/ area of erythema and induration RLQ  Extremities: WWP, 2+ peripheral pulses b/l; diffuse anasarca  Skin: normal color and turgor; no rash  Neurological: AO*0; rhythmic R facial and arm twitching      MEDICATIONS:  MEDICATIONS  (STANDING):  albuterol/ipratropium for Nebulization 3 milliLiter(s) Nebulizer every 6 hours  apixaban 5 milliGRAM(s) Oral every 12 hours  artificial tears (preservative free) Ophthalmic Solution 1 Drop(s) Both EYES every 4 hours  atorvastatin 10 milliGRAM(s) Oral at bedtime  calamine/zinc oxide Lotion 1 Application(s) Topical daily  chlorhexidine 0.12% Liquid 15 milliLiter(s) Oral Mucosa every 12 hours  chlorhexidine 2% Cloths 1 Application(s) Topical daily  dextrose 5%. 1000 milliLiter(s) (50 mL/Hr) IV Continuous <Continuous>  dextrose 5%. 1000 milliLiter(s) (100 mL/Hr) IV Continuous <Continuous>  dextrose 50% Injectable 12.5 Gram(s) IV Push once  dextrose 50% Injectable 25 Gram(s) IV Push once  dextrose 50% Injectable 25 Gram(s) IV Push once  dextrose 50% Injectable 25 Gram(s) IV Push once  droxidopa 600 milliGRAM(s) Oral every 8 hours  epoetin odalis (EPOGEN) Injectable 55188 Unit(s) IV Push <User Schedule>  ferrous    sulfate Liquid 300 milliGRAM(s) Oral daily  glucagon  Injectable 1 milliGRAM(s) IntraMuscular once  insulin lispro (ADMELOG) corrective regimen sliding scale   SubCutaneous every 6 hours  insulin NPH human recombinant 6 Unit(s) SubCutaneous every 6 hours  midodrine. 40 milliGRAM(s) Oral <User Schedule>  pantoprazole  Injectable 40 milliGRAM(s) IV Push two times a day  petrolatum Ophthalmic Ointment 1 Application(s) Both EYES four times a day  sodium chloride 1 Gram(s) Oral two times a day  sodium chloride 3%  Inhalation 4 milliLiter(s) Inhalation every 6 hours    MEDICATIONS  (PRN):  acetaminophen   Oral Liquid .. 650 milliGRAM(s) Oral every 6 hours PRN Temp greater or equal to 38C (100.4F), Mild Pain (1 - 3)  dextrose Oral Gel 15 Gram(s) Oral once PRN Blood Glucose LESS THAN 70 milliGRAM(s)/deciliter  diphenhydrAMINE Elixir 50 milliGRAM(s) Oral once PRN Rash and/or Itching  droxidopa 600 milliGRAM(s) Oral <User Schedule> PRN prior to hemodialysis  midodrine. 40 milliGRAM(s) Oral once PRN 1 Hour Pre HD  sodium chloride 0.9% Bolus. 250 milliLiter(s) IV Bolus every 5 minutes PRN SBP LESS THAN or EQUAL to 90 mmHg  sodium chloride 0.9% lock flush 10 milliLiter(s) IV Push every 1 hour PRN Pre/post blood products, medications, blood draw, and to maintain line patency      ALLERGIES:  Allergies    isoniazid (Rash)  nafcillin (Unknown)  hydrALAZINE (Rash)  vitamin E (Short breath; Urticaria; Hives)  doxycycline (Rash)  cefepime (Rash)  NIFEdipine (Urticaria; Hives)  Zosyn (Rash)    Intolerances        LABS:                        9.1    13.05 )-----------( 485      ( 11 Nov 2023 00:25 )             31.7     11-11    136  |  102  |  30<H>  ----------------------------<  177<H>  4.8   |  25  |  1.18    Ca    8.7      11 Nov 2023 00:25  Phos  4.7     11-11  Mg     2.40     11-11    TPro  6.7  /  Alb  1.9<L>  /  TBili  <0.2  /  DBili  x   /  AST  29  /  ALT  19  /  AlkPhos  352<H>  11-11      Urinalysis Basic - ( 11 Nov 2023 00:25 )    Color: x / Appearance: x / SG: x / pH: x  Gluc: 177 mg/dL / Ketone: x  / Bili: x / Urobili: x   Blood: x / Protein: x / Nitrite: x   Leuk Esterase: x / RBC: x / WBC x   Sq Epi: x / Non Sq Epi: x / Bacteria: x       MICU Progress Note    INTERVAL HPI/OVERNIGHT EVENTS: Keppra dc'd per neurology recs. Planning for [facility] 11/14.     SUBJECTIVE: Patient seen and examined at bedside. Discussed plan w/ patient's daughter.     ROS: unable to assess    OBJECTIVE:    VITAL SIGNS:  ICU Vital Signs Last 24 Hrs  T(C): 37.3 (11 Nov 2023 04:00), Max: 37.6 (10 Nov 2023 20:00)  T(F): 99.2 (11 Nov 2023 04:00), Max: 99.7 (11 Nov 2023 00:00)  HR: 119 (11 Nov 2023 06:00) (104 - 123)  BP: --  BP(mean): --  ABP: 123/47 (11 Nov 2023 06:00) (78/31 - 142/63)  ABP(mean): 79 (11 Nov 2023 06:00) (49 - 97)  RR: 24 (11 Nov 2023 06:00) (19 - 29)  SpO2: 98% (11 Nov 2023 06:00) (91% - 100%)    O2 Parameters below as of 11 Nov 2023 03:51  Patient On (Oxygen Delivery Method): ventilator          Mode: AC/ CMV (Assist Control/ Continuous Mandatory Ventilation), RR (machine): 24, FiO2: 40, PEEP: 8, ITime: 0.8, MAP: 20, PC: 35, PIP: 43    11-10 @ 07:01  -  11-11 @ 07:00  --------------------------------------------------------  IN: 1610 mL / OUT: 2600 mL / NET: -990 mL      CAPILLARY BLOOD GLUCOSE      POCT Blood Glucose.: 179 mg/dL (11 Nov 2023 05:48)      PHYSICAL EXAM:  General: NAD  HEENT: NC/AT; PERRL, clear conjunctiva; L ear w/ grainy white residue, no bleeding appreciated  Neck: supple  Respiratory: CTA b/l  Cardiovascular: +S1/S2; RRR  Abdomen: soft, distended, warm to touch w/ area of erythema and induration RLQ (improving)  Extremities: WWP, 2+ peripheral pulses b/l; diffuse anasarca  Skin: normal color and turgor; no rash  Neurological: AO*0; rhythmic R facial and arm twitching      MEDICATIONS:  MEDICATIONS  (STANDING):  albuterol/ipratropium for Nebulization 3 milliLiter(s) Nebulizer every 6 hours  apixaban 5 milliGRAM(s) Oral every 12 hours  artificial tears (preservative free) Ophthalmic Solution 1 Drop(s) Both EYES every 4 hours  atorvastatin 10 milliGRAM(s) Oral at bedtime  calamine/zinc oxide Lotion 1 Application(s) Topical daily  chlorhexidine 0.12% Liquid 15 milliLiter(s) Oral Mucosa every 12 hours  chlorhexidine 2% Cloths 1 Application(s) Topical daily  dextrose 5%. 1000 milliLiter(s) (50 mL/Hr) IV Continuous <Continuous>  dextrose 5%. 1000 milliLiter(s) (100 mL/Hr) IV Continuous <Continuous>  dextrose 50% Injectable 12.5 Gram(s) IV Push once  dextrose 50% Injectable 25 Gram(s) IV Push once  dextrose 50% Injectable 25 Gram(s) IV Push once  dextrose 50% Injectable 25 Gram(s) IV Push once  droxidopa 600 milliGRAM(s) Oral every 8 hours  epoetin odalis (EPOGEN) Injectable 61554 Unit(s) IV Push <User Schedule>  ferrous    sulfate Liquid 300 milliGRAM(s) Oral daily  glucagon  Injectable 1 milliGRAM(s) IntraMuscular once  insulin lispro (ADMELOG) corrective regimen sliding scale   SubCutaneous every 6 hours  insulin NPH human recombinant 6 Unit(s) SubCutaneous every 6 hours  midodrine. 40 milliGRAM(s) Oral <User Schedule>  pantoprazole  Injectable 40 milliGRAM(s) IV Push two times a day  petrolatum Ophthalmic Ointment 1 Application(s) Both EYES four times a day  sodium chloride 1 Gram(s) Oral two times a day  sodium chloride 3%  Inhalation 4 milliLiter(s) Inhalation every 6 hours    MEDICATIONS  (PRN):  acetaminophen   Oral Liquid .. 650 milliGRAM(s) Oral every 6 hours PRN Temp greater or equal to 38C (100.4F), Mild Pain (1 - 3)  dextrose Oral Gel 15 Gram(s) Oral once PRN Blood Glucose LESS THAN 70 milliGRAM(s)/deciliter  diphenhydrAMINE Elixir 50 milliGRAM(s) Oral once PRN Rash and/or Itching  droxidopa 600 milliGRAM(s) Oral <User Schedule> PRN prior to hemodialysis  midodrine. 40 milliGRAM(s) Oral once PRN 1 Hour Pre HD  sodium chloride 0.9% Bolus. 250 milliLiter(s) IV Bolus every 5 minutes PRN SBP LESS THAN or EQUAL to 90 mmHg  sodium chloride 0.9% lock flush 10 milliLiter(s) IV Push every 1 hour PRN Pre/post blood products, medications, blood draw, and to maintain line patency      ALLERGIES:  Allergies    isoniazid (Rash)  nafcillin (Unknown)  hydrALAZINE (Rash)  vitamin E (Short breath; Urticaria; Hives)  doxycycline (Rash)  cefepime (Rash)  NIFEdipine (Urticaria; Hives)  Zosyn (Rash)    Intolerances        LABS:                        9.1    13.05 )-----------( 485      ( 11 Nov 2023 00:25 )             31.7     11-11    136  |  102  |  30<H>  ----------------------------<  177<H>  4.8   |  25  |  1.18    Ca    8.7      11 Nov 2023 00:25  Phos  4.7     11-11  Mg     2.40     11-11    TPro  6.7  /  Alb  1.9<L>  /  TBili  <0.2  /  DBili  x   /  AST  29  /  ALT  19  /  AlkPhos  352<H>  11-11      Urinalysis Basic - ( 11 Nov 2023 00:25 )    Color: x / Appearance: x / SG: x / pH: x  Gluc: 177 mg/dL / Ketone: x  / Bili: x / Urobili: x   Blood: x / Protein: x / Nitrite: x   Leuk Esterase: x / RBC: x / WBC x   Sq Epi: x / Non Sq Epi: x / Bacteria: x

## 2023-11-11 NOTE — PROGRESS NOTE ADULT - ATTENDING COMMENTS
Patient is a 74 yo F (wheelchair bound prior to admission) w/ HFpEF, T2DM, CKD5, latent TB (s/p tx,) hx breast ca (2018, s/p mastectomy and adjuvant CT/RT), and hx prior CVA s/p trach/PEG (decannulated prior to admission, ILR in place) who was initially admitted on 8/11/23 after p/w with acute hypoxemic and hypercapnic respiratory failure thought to be 2/2 Aspiration PNA vs ADHF/flash pulmonary edema in setting of HTN emergency. Patient required intubation and mechanical ventilationtubation, and was admitted to MICU for lengthy period of time.     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     Hospital course also marked by recurrent infections, including MSSA bacteremia (s/p Vanc due to possible cefepime vs. cefazolin drug-induced rash) and MSSA/ESBL EColi in BAL. Also found to hve L otitis externa (9/5) s/p ENT debridement c/b concern for superimposed candida infection (s/p Meropenem, Fluconazole). Most recently w/ Proteus/Pseudomonas in sputum now s/p Aztreonam. Also further found to have L popliteal DVT.    Patient transferred back to MICU for trial of HD, possibly requiring IV vasopressors. A line placed over the weekend revealing inaccurate noninvasive BP measurements.    - Wean vasopressors to maintain MAP > 65    - c/w mechanical ventilatory support, trach care per MICU team   - HD as per renal   - c/w tube feeds  - c/w Eliquis, hb stable. Cont ASA  - ENT recs appreciated

## 2023-11-11 NOTE — PROGRESS NOTE ADULT - ASSESSMENT
72 y/o F well known to me from my housecal outpt practice. she was admitted at [facility] 7/12-7/22 w aspiration PNA, was treated w CEFEPIME, developed an allergic rash,  dCHF, + MAC on AFB culture, had been progressively getting more and more lethargic and dyspneic at home since DC.   In  am of 8/11/23  ptn presented with respiratory distress w hypoxia and hypercarbia requiring intubation 2/2 volume overload +/- Asp PNA      Neuro   not responsive  Baseline MS AOx3, aphasic   - h/o CVA , on aspirin and statin . resumed w feeding tube, ASA resumed 8/26  - eeg  2/2 tremors, no sz focus, right facial twitching, EEG has been negative. 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 probably embolic in nature, did not tolerate full AC in the past, STEPHANIE is neg , no shunts observed  - ptn is poorly reactive, rpt scan done, no further CVA seen.     Cardiac   cardiology following  CHFpEF   TTE 7/2023 with EF 59%, with severe LVH and diastolic dysfunction   off Levo drip , now only during HD  on midodrine 40 mg qid, droxidopa 600 tid, additional 200 mg prior to HD,     Pulmonary   Acute hypercapnic and hypoxemic respiratory failure   prolonged intubation, now  trache to  vent, has copious secretions      Renal   on HD via L IJ Shiley, tunneled catheter when clinically stable  HD MWF, midodrine and pressor assisted,   permacath  scheduled to be place in IR on 11/14      GI  NPO  on tube feeds  UGI bleed 8/31,  EGD/Colonoscopy: erosive gastropathy, esophagitis on colonoscopy old blood seen no active bleeding, poor prep  NGT-TF  s/p 2UPRBC 11/4    Endocrine  on Insulin: NPH, Admelog    ID   complicated infectious hospital course  treated for MSSA bacteremia, aspiration PNA.  sputum + for pseudomonas, ptn was initially on zosyn but was allergic, then was switched to aztreonam   Aztreonam was switched to polymyxin for a possible allergy but ptn didnt have a reaction to aztreonam, she had a reaction to Zosyn.   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 externa resolved    Heme/Onc  on eliquis for  LEFT POPLITEAL VEIN ACUTE DVT , on ELiquis    Ethics  GOC - Discussed GOC with daughter and , they have opted in the past for full code. and she remains full code at present

## 2023-11-12 NOTE — PROGRESS NOTE ADULT - SUBJECTIVE AND OBJECTIVE BOX
INTERVAL HPI/OVERNIGHT EVENTS:    SUBJECTIVE: Patient seen and examined at bedside.       VITAL SIGNS:  ICU Vital Signs Last 24 Hrs  T(C): 37.1 (12 Nov 2023 04:00), Max: 37.6 (11 Nov 2023 20:00)  T(F): 98.7 (12 Nov 2023 04:00), Max: 99.7 (11 Nov 2023 20:00)  HR: 115 (12 Nov 2023 07:00) (101 - 122)  BP: --  BP(mean): --  ABP: 98/84 (12 Nov 2023 07:00) (98/84 - 151/62)  ABP(mean): 91 (12 Nov 2023 07:00) (67 - 99)  RR: 22 (12 Nov 2023 07:00) (16 - 25)  SpO2: 98% (12 Nov 2023 07:00) (93% - 100%)    O2 Parameters below as of 12 Nov 2023 07:00  Patient On (Oxygen Delivery Method): ventilator          Mode: AC/ CMV (Assist Control/ Continuous Mandatory Ventilation), RR (machine): 24, FiO2: 40, PEEP: 8, ITime: 0.8, MAP: 19, PC: 35, PIP: 42  Plateau pressure:   P/F ratio:     11-11 @ 07:01  -  11-12 @ 07:00  --------------------------------------------------------  IN: 1230 mL / OUT: 203 mL / NET: 1027 mL      CAPILLARY BLOOD GLUCOSE      POCT Blood Glucose.: 163 mg/dL (12 Nov 2023 05:38)    ECG:    PHYSICAL EXAM:    General:   HEENT:   Neck:   Respiratory:   Cardiovascular:   Abdomen:   Extremities:  Neurological:    MEDICATIONS:  MEDICATIONS  (STANDING):  albuterol/ipratropium for Nebulization 3 milliLiter(s) Nebulizer every 6 hours  artificial tears (preservative free) Ophthalmic Solution 1 Drop(s) Both EYES every 4 hours  atorvastatin 10 milliGRAM(s) Oral at bedtime  calamine/zinc oxide Lotion 1 Application(s) Topical daily  chlorhexidine 0.12% Liquid 15 milliLiter(s) Oral Mucosa every 12 hours  chlorhexidine 2% Cloths 1 Application(s) Topical daily  dextrose 5%. 1000 milliLiter(s) (100 mL/Hr) IV Continuous <Continuous>  dextrose 5%. 1000 milliLiter(s) (50 mL/Hr) IV Continuous <Continuous>  dextrose 50% Injectable 25 Gram(s) IV Push once  dextrose 50% Injectable 25 Gram(s) IV Push once  dextrose 50% Injectable 25 Gram(s) IV Push once  dextrose 50% Injectable 12.5 Gram(s) IV Push once  droxidopa 600 milliGRAM(s) Oral every 8 hours  epoetin odalis (EPOGEN) Injectable 43836 Unit(s) IV Push <User Schedule>  ferrous    sulfate Liquid 300 milliGRAM(s) Oral daily  glucagon  Injectable 1 milliGRAM(s) IntraMuscular once  insulin lispro (ADMELOG) corrective regimen sliding scale   SubCutaneous every 6 hours  insulin NPH human recombinant 6 Unit(s) SubCutaneous every 6 hours  midodrine. 40 milliGRAM(s) Oral <User Schedule>  pantoprazole  Injectable 40 milliGRAM(s) IV Push two times a day  petrolatum Ophthalmic Ointment 1 Application(s) Both EYES four times a day  sodium chloride 1 Gram(s) Oral two times a day  sodium chloride 3%  Inhalation 4 milliLiter(s) Inhalation every 6 hours    MEDICATIONS  (PRN):  acetaminophen   Oral Liquid .. 650 milliGRAM(s) Oral every 6 hours PRN Temp greater or equal to 38C (100.4F), Mild Pain (1 - 3)  dextrose Oral Gel 15 Gram(s) Oral once PRN Blood Glucose LESS THAN 70 milliGRAM(s)/deciliter  diphenhydrAMINE Elixir 50 milliGRAM(s) Oral once PRN Rash and/or Itching  droxidopa 600 milliGRAM(s) Oral <User Schedule> PRN prior to hemodialysis  midodrine. 40 milliGRAM(s) Oral once PRN 1 Hour Pre HD  sodium chloride 0.9% Bolus. 250 milliLiter(s) IV Bolus every 5 minutes PRN SBP LESS THAN or EQUAL to 90 mmHg  sodium chloride 0.9% lock flush 10 milliLiter(s) IV Push every 1 hour PRN Pre/post blood products, medications, blood draw, and to maintain line patency      ALLERGIES:  Allergies    isoniazid (Rash)  nafcillin (Unknown)  hydrALAZINE (Rash)  vitamin E (Short breath; Urticaria; Hives)  doxycycline (Rash)  cefepime (Rash)  NIFEdipine (Urticaria; Hives)  Zosyn (Rash)    Intolerances        LABS:                        8.9    14.53 )-----------( 491      ( 12 Nov 2023 02:30 )             30.6     11-12    135  |  101  |  37<H>  ----------------------------<  164<H>  5.0   |  25  |  1.54<H>    Ca    8.6      12 Nov 2023 02:30  Phos  5.1     11-12  Mg     2.40     11-12    TPro  6.4  /  Alb  1.8<L>  /  TBili  <0.2  /  DBili  x   /  AST  30  /  ALT  18  /  AlkPhos  334<H>  11-12      Urinalysis Basic - ( 12 Nov 2023 02:30 )    Color: x / Appearance: x / SG: x / pH: x  Gluc: 164 mg/dL / Ketone: x  / Bili: x / Urobili: x   Blood: x / Protein: x / Nitrite: x   Leuk Esterase: x / RBC: x / WBC x   Sq Epi: x / Non Sq Epi: x / Bacteria: x        RADIOLOGY & ADDITIONAL TESTS: Reviewed.   INTERVAL HPI/ OVERNIGHT EVENTS: No overnight events.     SUBJECTIVE: Patient seen and examined at bedside. Unable to assess ROS due to AMS.      VITAL SIGNS:  ICU Vital Signs Last 24 Hrs  T(C): 37.1 (12 Nov 2023 04:00), Max: 37.6 (11 Nov 2023 20:00)  T(F): 98.7 (12 Nov 2023 04:00), Max: 99.7 (11 Nov 2023 20:00)  HR: 115 (12 Nov 2023 07:00) (101 - 122)  BP: --  BP(mean): --  ABP: 98/84 (12 Nov 2023 07:00) (98/84 - 151/62)  ABP(mean): 91 (12 Nov 2023 07:00) (67 - 99)  RR: 22 (12 Nov 2023 07:00) (16 - 25)  SpO2: 98% (12 Nov 2023 07:00) (93% - 100%)    O2 Parameters below as of 12 Nov 2023 07:00  Patient On (Oxygen Delivery Method): ventilator          Mode: AC/ CMV (Assist Control/ Continuous Mandatory Ventilation), RR (machine): 24, FiO2: 40, PEEP: 8, ITime: 0.8, MAP: 19, PC: 35, PIP: 42  Plateau pressure:   P/F ratio:     11-11 @ 07:01  -  11-12 @ 07:00  --------------------------------------------------------  IN: 1230 mL / OUT: 203 mL / NET: 1027 mL      CAPILLARY BLOOD GLUCOSE      POCT Blood Glucose.: 163 mg/dL (12 Nov 2023 05:38)    ECG:    General: NAD  HEENT: NC/AT; PERRL, clear conjunctiva; L ear w/ grainy white residue, no bleeding appreciated  Neck: supple  Respiratory: CTA b/l  Cardiovascular: +S1/S2; RRR  Abdomen: soft, distended, warm to touch w/ area of erythema and induration RLQ (improving)  Extremities: WWP, 2+ peripheral pulses b/l; diffuse anasarca  Skin: normal color and turgor; no rash  Neurological: AO*0; rhythmic R facial and arm twitching      MEDICATIONS:  MEDICATIONS  (STANDING):  albuterol/ipratropium for Nebulization 3 milliLiter(s) Nebulizer every 6 hours  artificial tears (preservative free) Ophthalmic Solution 1 Drop(s) Both EYES every 4 hours  atorvastatin 10 milliGRAM(s) Oral at bedtime  calamine/zinc oxide Lotion 1 Application(s) Topical daily  chlorhexidine 0.12% Liquid 15 milliLiter(s) Oral Mucosa every 12 hours  chlorhexidine 2% Cloths 1 Application(s) Topical daily  dextrose 5%. 1000 milliLiter(s) (100 mL/Hr) IV Continuous <Continuous>  dextrose 5%. 1000 milliLiter(s) (50 mL/Hr) IV Continuous <Continuous>  dextrose 50% Injectable 25 Gram(s) IV Push once  dextrose 50% Injectable 25 Gram(s) IV Push once  dextrose 50% Injectable 25 Gram(s) IV Push once  dextrose 50% Injectable 12.5 Gram(s) IV Push once  droxidopa 600 milliGRAM(s) Oral every 8 hours  epoetin odalis (EPOGEN) Injectable 22474 Unit(s) IV Push <User Schedule>  ferrous    sulfate Liquid 300 milliGRAM(s) Oral daily  glucagon  Injectable 1 milliGRAM(s) IntraMuscular once  insulin lispro (ADMELOG) corrective regimen sliding scale   SubCutaneous every 6 hours  insulin NPH human recombinant 6 Unit(s) SubCutaneous every 6 hours  midodrine. 40 milliGRAM(s) Oral <User Schedule>  pantoprazole  Injectable 40 milliGRAM(s) IV Push two times a day  petrolatum Ophthalmic Ointment 1 Application(s) Both EYES four times a day  sodium chloride 1 Gram(s) Oral two times a day  sodium chloride 3%  Inhalation 4 milliLiter(s) Inhalation every 6 hours    MEDICATIONS  (PRN):  acetaminophen   Oral Liquid .. 650 milliGRAM(s) Oral every 6 hours PRN Temp greater or equal to 38C (100.4F), Mild Pain (1 - 3)  dextrose Oral Gel 15 Gram(s) Oral once PRN Blood Glucose LESS THAN 70 milliGRAM(s)/deciliter  diphenhydrAMINE Elixir 50 milliGRAM(s) Oral once PRN Rash and/or Itching  droxidopa 600 milliGRAM(s) Oral <User Schedule> PRN prior to hemodialysis  midodrine. 40 milliGRAM(s) Oral once PRN 1 Hour Pre HD  sodium chloride 0.9% Bolus. 250 milliLiter(s) IV Bolus every 5 minutes PRN SBP LESS THAN or EQUAL to 90 mmHg  sodium chloride 0.9% lock flush 10 milliLiter(s) IV Push every 1 hour PRN Pre/post blood products, medications, blood draw, and to maintain line patency      ALLERGIES:  Allergies    isoniazid (Rash)  nafcillin (Unknown)  hydrALAZINE (Rash)  vitamin E (Short breath; Urticaria; Hives)  doxycycline (Rash)  cefepime (Rash)  NIFEdipine (Urticaria; Hives)  Zosyn (Rash)    Intolerances        LABS:                        8.9    14.53 )-----------( 491      ( 12 Nov 2023 02:30 )             30.6     11-12    135  |  101  |  37<H>  ----------------------------<  164<H>  5.0   |  25  |  1.54<H>    Ca    8.6      12 Nov 2023 02:30  Phos  5.1     11-12  Mg     2.40     11-12    TPro  6.4  /  Alb  1.8<L>  /  TBili  <0.2  /  DBili  x   /  AST  30  /  ALT  18  /  AlkPhos  334<H>  11-12      Urinalysis Basic - ( 12 Nov 2023 02:30 )    Color: x / Appearance: x / SG: x / pH: x  Gluc: 164 mg/dL / Ketone: x  / Bili: x / Urobili: x   Blood: x / Protein: x / Nitrite: x   Leuk Esterase: x / RBC: x / WBC x   Sq Epi: x / Non Sq Epi: x / Bacteria: x        RADIOLOGY & ADDITIONAL TESTS: Reviewed.

## 2023-11-12 NOTE — PROGRESS NOTE ADULT - SUBJECTIVE AND OBJECTIVE BOX
Subjective: Patient seen and examined. No new events except as noted.   now bordering in CTICU   REVIEW OF SYSTEMS:  Unable to obtain     MEDICATIONS:  MEDICATIONS  (STANDING):  albuterol/ipratropium for Nebulization 3 milliLiter(s) Nebulizer every 6 hours  artificial tears (preservative free) Ophthalmic Solution 1 Drop(s) Both EYES every 4 hours  atorvastatin 10 milliGRAM(s) Oral at bedtime  calamine/zinc oxide Lotion 1 Application(s) Topical daily  chlorhexidine 0.12% Liquid 15 milliLiter(s) Oral Mucosa every 12 hours  chlorhexidine 2% Cloths 1 Application(s) Topical daily  dextrose 5%. 1000 milliLiter(s) (50 mL/Hr) IV Continuous <Continuous>  dextrose 5%. 1000 milliLiter(s) (100 mL/Hr) IV Continuous <Continuous>  dextrose 50% Injectable 12.5 Gram(s) IV Push once  dextrose 50% Injectable 25 Gram(s) IV Push once  dextrose 50% Injectable 25 Gram(s) IV Push once  dextrose 50% Injectable 25 Gram(s) IV Push once  droxidopa 600 milliGRAM(s) Oral every 8 hours  epoetin odalis (EPOGEN) Injectable 09382 Unit(s) IV Push <User Schedule>  ferrous    sulfate Liquid 300 milliGRAM(s) Oral daily  glucagon  Injectable 1 milliGRAM(s) IntraMuscular once  insulin lispro (ADMELOG) corrective regimen sliding scale   SubCutaneous every 6 hours  insulin NPH human recombinant 6 Unit(s) SubCutaneous every 6 hours  midodrine. 40 milliGRAM(s) Oral <User Schedule>  pantoprazole  Injectable 40 milliGRAM(s) IV Push two times a day  petrolatum Ophthalmic Ointment 1 Application(s) Both EYES four times a day  sodium chloride 1 Gram(s) Oral two times a day  sodium chloride 3%  Inhalation 4 milliLiter(s) Inhalation every 6 hours      PHYSICAL EXAM:  T(C): 37.2 (11-12-23 @ 08:00), Max: 37.6 (11-11-23 @ 20:00)  HR: 110 (11-12-23 @ 08:00) (101 - 122)  BP: --  RR: 21 (11-12-23 @ 08:00) (16 - 25)  SpO2: 99% (11-12-23 @ 08:00) (94% - 100%)  Wt(kg): --  I&O's Summary    11 Nov 2023 07:01  -  12 Nov 2023 07:00  --------------------------------------------------------  IN: 1230 mL / OUT: 203 mL / NET: 1027 mL    12 Nov 2023 07:01  -  12 Nov 2023 08:35  --------------------------------------------------------  IN: 70 mL / OUT: 0 mL / NET: 70 mL        Appearance: NAD, +trach  +NGT  HEENT: dry oral mucosa  Lymphatic: No lymphadenopathy  Cardiovascular: Normal S1 S2, No JVD, No murmurs, No edema  Respiratory: Decreased BS, + trach to vent	  Neuro: opens eyes  Gastrointestinal: Soft, Non-tender, + BS, + NGT  Skin: No rashes, No ecchymoses, No cyanosis	  Extremities: No strength/ROM 2/2 sedation, + BL LE edema  Vascular: Peripheral pulses palpable 2+ bilaterally  Anasarca     Mode: AC/ CMV (Assist Control/ Continuous Mandatory Ventilation), RR (machine): 24, FiO2: 40, PEEP: 8, ITime: 0.8, MAP: 19, PC: 35, PIP: 42  Plateau pressure:   P/F ratio:     LABS:    CARDIAC MARKERS:                                8.9    14.53 )-----------( 491      ( 12 Nov 2023 02:30 )             30.6     11-12    135  |  101  |  37<H>  ----------------------------<  164<H>  5.0   |  25  |  1.54<H>    Ca    8.6      12 Nov 2023 02:30  Phos  5.1     11-12  Mg     2.40     11-12    TPro  6.4  /  Alb  1.8<L>  /  TBili  <0.2  /  DBili  x   /  AST  30  /  ALT  18  /  AlkPhos  334<H>  11-12    proBNP:   Lipid Profile:   HgA1c:   TSH:             TELEMETRY: SR ST 	    ECG:  	  RADIOLOGY:   DIAGNOSTIC TESTING:  [ ] Echocardiogram:  [ ]  Catheterization:  [ ] Stress Test:    OTHER:

## 2023-11-12 NOTE — PROGRESS NOTE ADULT - ASSESSMENT
74 y/o F well known to me from my housecal outpt practice. she was admitted at [facility] 7/12-7/22 w aspiration PNA, was treated w CEFEPIME, developed an allergic rash,  dCHF, + MAC on AFB culture, had been progressively getting more and more lethargic and dyspneic at home since DC.   In  am of 8/11/23  ptn presented with respiratory distress w hypoxia and hypercarbia requiring intubation 2/2 volume overload +/- Asp PNA      Neuro   not responsive  Baseline MS AOx3, aphasic   - h/o CVA , on aspirin and statin . resumed w feeding tube, ASA resumed 8/26  - eeg  2/2 tremors, no sz focus, right facial twitching, EEG has been negative. 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 probably embolic in nature, did not tolerate full AC in the past, STEPHANIE is neg , no shunts observed  - ptn is poorly reactive, rpt scan done, no further CVA seen.     Cardiac   cardiology following  CHFpEF   TTE 7/2023 with EF 59%, with severe LVH and diastolic dysfunction   off Levo drip , now only during HD  on midodrine 40 mg qid, droxidopa 600 tid, additional 200 mg prior to HD,     Pulmonary   Acute hypercapnic and hypoxemic respiratory failure   prolonged intubation, now  trache to  vent, has copious secretions      Renal   on HD via L IJ Shiley, tunneled catheter when clinically stable  HD MWF, midodrine and pressor assisted,   permacath  scheduled to be place in IR on 11/14      GI  NPO  on tube feeds  UGI bleed 8/31,  EGD/Colonoscopy: erosive gastropathy, esophagitis on colonoscopy old blood seen no active bleeding, poor prep  NGT-TF  s/p 2UPRBC 11/4    Endocrine  on Insulin: NPH, Admelog    ID   complicated infectious hospital course  treated for MSSA bacteremia, aspiration PNA.  sputum + for pseudomonas, ptn was initially on zosyn but was allergic, then was switched to aztreonam   Aztreonam was switched to polymyxin for a possible allergy but ptn didnt have a reaction to aztreonam, she had a reaction to Zosyn.   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 externa resolved    Heme/Onc  on eliquis for  LEFT POPLITEAL VEIN ACUTE DVT , on ELiquis    Ethics  GOC - Discussed GOC with daughter and , they have opted in the past for full code. and she remains full code at present

## 2023-11-12 NOTE — PROGRESS NOTE ADULT - ASSESSMENT
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       NEUROLOGY  #AMS  Multiple etiologies including active infection vs CVA.   MR head performed w/ new infarct and old infarcts, EEG without seizure events but with high foci potential   - f/u spot EEG given facial twitching: negative for epileptiform activity (10/31)  - deferring repeat MRI as unlikely to   - continue lipitor  - keppra stopped 11/10 (was on as seizure ppx given multiple infarcts, no seizures on multiple EEG, stopped to ensure it is not contributing to sedation)  - aspirin held 10/11, likely in s/o GIB, Hgb stable not requiring pRBC since 11/4      CARDIOVASCULAR  # Septic vs vasoplegic shock   Etiology likely septic iso active infection. Possible component of vasoplegic, however no longer with active infection or on sedation. Off IV vasopressors.   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   - resolved    RESPIRATORY  # AHRF second to volume overload   - CKD vs HFpEF w/ hypercarbia 2/2 volume overload requiring intubation, trach, and HD initiation  - Continue on albuterol and chest PT  - Continue volume removal with HD      #tracheomalacia   - CT CHEST (9/8) with tracheomalacia, BL pleural effusions and continued consolidations     GI  # UGIB: last pRBC transfused 11/4 (10/14 before that)   - Continue on PPI BID    # Dysphagia   - NGT-TF     RENAL  # ESRD  - HD MWF TIW with midodrine and droxidopa prior  - ICU for vasopressor assisted volume removal, cap to 1 pressor and not offering CRRT due to comorbidities and prognosis   - f/u w/ nephrology: will continue to offer patient 1-pressor assisted HD as agreed prior, as long as she can tolerate HD maxed on 1 pressor, she will be considered iHD candidate  - UF -2.5L w/ HD; tolerates w/ norepi gtt  - IR planning permacath 11/14; procedure note and order placed, eliquis held (last dose 11/11 PM)    INFECTIOUS DISEASE  #Sepsis: temp 101.2 on 11/2; sources include skin (poorly demarcated area of induration/erythema, enlarging on RLQ), pulm (copious sputum)  - Bcx, sputum cx sent  - repeat MRSA PCR neg  - ID re-consulted: abd edema likely 2/2 chronic panniculitis  - Abx:      --> Vanc (11/2) - MRSA neg, dc'd     --> Aztreo 2g (11/2) --> dc'd, 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 Coli sputum Cx, BAL w/ MSSA  - treated with abx   - eosinophilia workup: JORGE, ANCAs negative    HEME  # Anemia second to GIB vs renal disease   - multiple transfusions, last done 11/4    #Allergic transfusion reaction: per RCU documentation, developed erythematous rash across chest shortly after starting transfusion, blood bank consulted and diagnosed mild allergic rxn  - premedicate w/ benadryl and famotidine prior to future transfusions  - no issue w/ pRBC transfusion 11/4    VASCULAR   # LLE POP DVT   - on Eliquis (held as of 11/11 PM for procedure), resume when able    # HD access  - TRISHA TAYLOR (9/27 - 10/21)  - TRISHA taylor replaced for HD (10/21 - )  - Permacath planned (11/14 - )    ONC   # Hx of Breast CA   - Patient dx in 2018 and s/p BL mastectomy (radical on right) and chemo and RT.     ENDOCRINE  # IDDM2   - Continued on NPH with ISS, however noted with hypoglycemic episodes and NPH dc'ed.    - Finger sticks trending up off NPH.   - Continue on NPH 6U with moderate ISS.   - Adjust as need       ETHICS/ GOC    - Attempted palliative discussion however family not interested and wishes for FULL CODE  - Family continues to want everything done despite inability to tolerate HD on multiple oral pressor  - Levo capped at 0.3 during HD

## 2023-11-12 NOTE — PROGRESS NOTE ADULT - SUBJECTIVE AND OBJECTIVE BOX
Patient is a 73y old  Female who presents with a chief complaint of Respiratory distress (12 Nov 2023 16:25)      SUBJECTIVE / OVERNIGHT EVENTS: now bordering in CT ICU, awaiting dialysis catheter placed in IR on 11/14    MEDICATIONS  (STANDING):  albuterol/ipratropium for Nebulization 3 milliLiter(s) Nebulizer every 6 hours  artificial tears (preservative free) Ophthalmic Solution 1 Drop(s) Both EYES every 4 hours  atorvastatin 10 milliGRAM(s) Oral at bedtime  calamine/zinc oxide Lotion 1 Application(s) Topical daily  chlorhexidine 0.12% Liquid 15 milliLiter(s) Oral Mucosa every 12 hours  chlorhexidine 2% Cloths 1 Application(s) Topical daily  dextrose 5%. 1000 milliLiter(s) (100 mL/Hr) IV Continuous <Continuous>  dextrose 5%. 1000 milliLiter(s) (50 mL/Hr) IV Continuous <Continuous>  dextrose 50% Injectable 25 Gram(s) IV Push once  dextrose 50% Injectable 25 Gram(s) IV Push once  dextrose 50% Injectable 25 Gram(s) IV Push once  dextrose 50% Injectable 12.5 Gram(s) IV Push once  droxidopa 600 milliGRAM(s) Oral every 8 hours  epoetin odalis (EPOGEN) Injectable 17932 Unit(s) IV Push <User Schedule>  ferrous    sulfate Liquid 300 milliGRAM(s) Oral daily  glucagon  Injectable 1 milliGRAM(s) IntraMuscular once  insulin lispro (ADMELOG) corrective regimen sliding scale   SubCutaneous every 6 hours  insulin NPH human recombinant 6 Unit(s) SubCutaneous every 6 hours  midodrine. 40 milliGRAM(s) Oral <User Schedule>  pantoprazole  Injectable 40 milliGRAM(s) IV Push two times a day  petrolatum Ophthalmic Ointment 1 Application(s) Both EYES four times a day  sodium chloride 1 Gram(s) Oral two times a day  sodium chloride 3%  Inhalation 4 milliLiter(s) Inhalation every 6 hours    MEDICATIONS  (PRN):  acetaminophen   Oral Liquid .. 650 milliGRAM(s) Oral every 6 hours PRN Temp greater or equal to 38C (100.4F), Mild Pain (1 - 3)  dextrose Oral Gel 15 Gram(s) Oral once PRN Blood Glucose LESS THAN 70 milliGRAM(s)/deciliter  diphenhydrAMINE Elixir 50 milliGRAM(s) Oral once PRN Rash and/or Itching  droxidopa 600 milliGRAM(s) Oral <User Schedule> PRN prior to hemodialysis  midodrine. 40 milliGRAM(s) Oral once PRN 1 Hour Pre HD  sodium chloride 0.9% Bolus. 250 milliLiter(s) IV Bolus every 5 minutes PRN SBP LESS THAN or EQUAL to 90 mmHg  sodium chloride 0.9% lock flush 10 milliLiter(s) IV Push every 1 hour PRN Pre/post blood products, medications, blood draw, and to maintain line patency      Vital Signs Last 24 Hrs  T(F): 99 (11-12-23 @ 16:00), Max: 99.7 (11-11-23 @ 20:00)  HR: 112 (11-12-23 @ 16:02) (101 - 122)  BP: --  RR: 11 (11-12-23 @ 16:00) (11 - 29)  SpO2: 99% (11-12-23 @ 16:02) (95% - 100%)  Telemetry:   CAPILLARY BLOOD GLUCOSE      POCT Blood Glucose.: 167 mg/dL (12 Nov 2023 17:13)  POCT Blood Glucose.: 161 mg/dL (12 Nov 2023 11:49)  POCT Blood Glucose.: 163 mg/dL (12 Nov 2023 05:38)  POCT Blood Glucose.: 159 mg/dL (12 Nov 2023 01:10)    I&O's Summary    11 Nov 2023 07:01  -  12 Nov 2023 07:00  --------------------------------------------------------  IN: 1230 mL / OUT: 203 mL / NET: 1027 mL    12 Nov 2023 07:01  -  12 Nov 2023 18:44  --------------------------------------------------------  IN: 450 mL / OUT: 100 mL / NET: 350 mL        PHYSICAL EXAM:  GENERAL: NAD, well-developed  HEAD:  Atraumatic, Normocephalic  EYES: EOMI, PERRLA, conjunctiva and sclera clear  NECK: Supple, No JVD  CHEST/LUNG: Clear to auscultation bilaterally; No wheeze  HEART: Regular rate and rhythm; No murmurs, rubs, or gallops  ABDOMEN: Soft, Nontender, Nondistended; Bowel sounds present  EXTREMITIES:  2+ Peripheral Pulses, No clubbing, cyanosis, or edema  PSYCH: AAOx3  NEUROLOGY: non-focal  SKIN: No rashes or lesions    LABS:                        8.9    14.53 )-----------( 491      ( 12 Nov 2023 02:30 )             30.6     11-12    135  |  101  |  37<H>  ----------------------------<  164<H>  5.0   |  25  |  1.54<H>    Ca    8.6      12 Nov 2023 02:30  Phos  5.1     11-12  Mg     2.40     11-12    TPro  6.4  /  Alb  1.8<L>  /  TBili  <0.2  /  DBili  x   /  AST  30  /  ALT  18  /  AlkPhos  334<H>  11-12          Urinalysis Basic - ( 12 Nov 2023 02:30 )    Color: x / Appearance: x / SG: x / pH: x  Gluc: 164 mg/dL / Ketone: x  / Bili: x / Urobili: x   Blood: x / Protein: x / Nitrite: x   Leuk Esterase: x / RBC: x / WBC x   Sq Epi: x / Non Sq Epi: x / Bacteria: x        RADIOLOGY & ADDITIONAL TESTS:    Imaging Personally Reviewed:    Consultant(s) Notes Reviewed:      Care Discussed with Consultants/Other Providers:

## 2023-11-12 NOTE — PROGRESS NOTE ADULT - ATTENDING COMMENTS
Patient is a 72 yo F (wheelchair bound prior to admission) w/ HFpEF, T2DM, CKD5, latent TB (s/p tx,) hx breast ca (2018, s/p mastectomy and adjuvant CT/RT), and hx prior CVA s/p trach/PEG (decannulated prior to admission, ILR in place) who was initially admitted on 8/11/23 after p/w with acute hypoxemic and hypercapnic respiratory failure thought to be 2/2 Aspiration PNA vs ADHF/flash pulmonary edema in setting of HTN emergency. Patient required intubation and mechanical ventilationtubation, and was admitted to MICU for lengthy period of time.     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     Hospital course also marked by recurrent infections, including MSSA bacteremia (s/p Vanc due to possible cefepime vs. cefazolin drug-induced rash) and MSSA/ESBL EColi in BAL. Also found to hve L otitis externa (9/5) s/p ENT debridement c/b concern for superimposed candida infection (s/p Meropenem, Fluconazole). Most recently w/ Proteus/Pseudomonas in sputum now s/p Aztreonam. Also further found to have L popliteal DVT.    Patient transferred back to MICU for trial of HD, possibly requiring IV vasopressors. A line placed over the weekend revealing inaccurate noninvasive BP measurements.    - Wean vasopressors to maintain MAP > 65    - c/w mechanical ventilatory support, trach care per MICU team   - HD as per renal   - c/w tube feeds  - c/w Eliquis, hb stable. Cont ASA  - ENT recs appreciated

## 2023-11-12 NOTE — PROGRESS NOTE ADULT - ASSESSMENT
IMPRESSION: 73F w/ HTN, DM2, CVA, breast CA-bilateral mastectomy, recurrent aspiration pneumonia/respiratory failure, and CKD, 8/11/23 p/w acute hypercapnic respiratory failure; c/b RUSS    (1)Renal - RUSS on CKD4 ==>now newly ESRD. Last IHD Friday 11/10    (2)Hyponatremia - Na+ improving with HD    (3)CV- tenuous hemodynamics such with each passing week we have more difficulty performing HD/achieving UF, persistent issue. now off vasopressors... we will see if BP can sustain off vasopressors with next IHD session and UF    (4)ID- BCx from 10/14/23 NGTD, BC (10/17) NGTD - s/p IV abx     (5)Pulm- trach/vent-dependent    (6)Anemia- hgb < goal though stable, epo with HD    (7)Hyperkalemia - improved with HD    RECOMMEND:  (1)No IHD today, will plan on next session tomorrow aiming for 2.4L again.   (2)Next HD Monday: pressors and sodium modelling as needed to keep SBP>90; Epogen with HD   (3)Dose new meds for GFR <10/HD    Benjamin Humphrey DO   [facility]  [phone]

## 2023-11-12 NOTE — PROGRESS NOTE ADULT - SUBJECTIVE AND OBJECTIVE BOX
NEPHROLOGY     Patient seen and examined, trach to vent. doing okay. no new issues. daughter in the room updated.     MEDICATIONS  (STANDING):  albuterol/ipratropium for Nebulization 3 milliLiter(s) Nebulizer every 6 hours  apixaban 5 milliGRAM(s) Oral every 12 hours  artificial tears (preservative free) Ophthalmic Solution 1 Drop(s) Both EYES every 4 hours  atorvastatin 10 milliGRAM(s) Oral at bedtime  calamine/zinc oxide Lotion 1 Application(s) Topical daily  chlorhexidine 0.12% Liquid 15 milliLiter(s) Oral Mucosa every 12 hours  chlorhexidine 2% Cloths 1 Application(s) Topical daily  dextrose 5%. 1000 milliLiter(s) (50 mL/Hr) IV Continuous <Continuous>  dextrose 5%. 1000 milliLiter(s) (100 mL/Hr) IV Continuous <Continuous>  dextrose 50% Injectable 12.5 Gram(s) IV Push once  dextrose 50% Injectable 25 Gram(s) IV Push once  dextrose 50% Injectable 25 Gram(s) IV Push once  dextrose 50% Injectable 25 Gram(s) IV Push once  droxidopa 600 milliGRAM(s) Oral every 8 hours  epoetin odalis (EPOGEN) Injectable 60096 Unit(s) IV Push <User Schedule>  ferrous    sulfate Liquid 300 milliGRAM(s) Oral daily  glucagon  Injectable 1 milliGRAM(s) IntraMuscular once  insulin lispro (ADMELOG) corrective regimen sliding scale   SubCutaneous every 6 hours  insulin NPH human recombinant 6 Unit(s) SubCutaneous every 6 hours  levETIRAcetam  Solution 1000 milliGRAM(s) Oral daily  levETIRAcetam  Solution 250 milliGRAM(s) Oral <User Schedule>  midodrine. 40 milliGRAM(s) Oral <User Schedule>  norepinephrine Infusion 0.02 MICROgram(s)/kG/Min (2.49 mL/Hr) IV Continuous <Continuous>  pantoprazole  Injectable 40 milliGRAM(s) IV Push two times a day  petrolatum Ophthalmic Ointment 1 Application(s) Both EYES four times a day  sodium chloride 1 Gram(s) Oral two times a day  sodium chloride 3%  Inhalation 4 milliLiter(s) Inhalation every 6 hours    VITALS:  T(C): , Max: 37.3 (11-09-23 @ 20:00)  T(F): , Max: 99.2 (11-09-23 @ 20:00)  HR: 114 (11-10-23 @ 13:00)  BP: --  RR: 24 (11-10-23 @ 13:00)  SpO2: 99% (11-10-23 @ 13:00)    PHYSICAL EXAM:  Constitutional: critically ill, trach to vent   HEENT: + NGT, + tracheostomy   Respiratory: coarse BS  Cardiovascular: tachy   Gastrointestinal: BS+, soft, NT/ND  Extremities: ++ generalized edema  Neurological: UTO  : No Wheeler  Skin: No rashes  Access: [facility]     LABS:                        8.9    12.38 )-----------( 520      ( 10 Nov 2023 01:15 )             30.0     11-10    132<L>  |  98  |  35<H>  ----------------------------<  172<H>  5.0   |  24  |  1.42<H>    Ca    8.7      10 Nov 2023 01:15  Phos  5.6     11-10  Mg     2.50     11-10    TPro  6.7  /  Alb  2.0<L>  /  TBili  <0.2  /  DBili  x   /  AST  23  /  ALT  17  /  AlkPhos  314<H>  11-10

## 2023-11-13 NOTE — PROGRESS NOTE ADULT - SUBJECTIVE AND OBJECTIVE BOX
[facility] [phone]        Patient seen and examined on HD, levophed started at beginning of HD for hypotension now off 's.         MEDICATIONS  (STANDING):  albuterol/ipratropium for Nebulization 3 milliLiter(s) Nebulizer every 6 hours  artificial tears (preservative free) Ophthalmic Solution 1 Drop(s) Both EYES every 4 hours  atorvastatin 10 milliGRAM(s) Oral at bedtime  calamine/zinc oxide Lotion 1 Application(s) Topical daily  chlorhexidine 0.12% Liquid 15 milliLiter(s) Oral Mucosa every 12 hours  chlorhexidine 2% Cloths 1 Application(s) Topical daily  dextrose 5%. 1000 milliLiter(s) (50 mL/Hr) IV Continuous <Continuous>  dextrose 5%. 1000 milliLiter(s) (100 mL/Hr) IV Continuous <Continuous>  dextrose 50% Injectable 12.5 Gram(s) IV Push once  dextrose 50% Injectable 25 Gram(s) IV Push once  dextrose 50% Injectable 25 Gram(s) IV Push once  dextrose 50% Injectable 25 Gram(s) IV Push once  droxidopa 600 milliGRAM(s) Oral every 8 hours  epoetin odalis (EPOGEN) Injectable 70935 Unit(s) IV Push <User Schedule>  ferrous    sulfate Liquid 300 milliGRAM(s) Oral daily  glucagon  Injectable 1 milliGRAM(s) IntraMuscular once  insulin lispro (ADMELOG) corrective regimen sliding scale   SubCutaneous every 6 hours  insulin NPH human recombinant 6 Unit(s) SubCutaneous every 6 hours  midodrine. 40 milliGRAM(s) Oral <User Schedule>  norepinephrine Infusion 0.05 MICROgram(s)/kG/Min (6.23 mL/Hr) IV Continuous <Continuous>  pantoprazole  Injectable 40 milliGRAM(s) IV Push two times a day  petrolatum Ophthalmic Ointment 1 Application(s) Both EYES four times a day  sodium chloride 1 Gram(s) Oral two times a day  sodium chloride 3%  Inhalation 4 milliLiter(s) Inhalation every 6 hours      VITAL:  T(C): , Max: 37.2 (11-12-23 @ 16:00)  T(F): , Max: 99 (11-12-23 @ 16:00)  HR: 109 (11-13-23 @ 13:00)  BP: --  BP(mean): --  RR: 19 (11-13-23 @ 13:00)  SpO2: 96% (11-13-23 @ 13:00)  Wt(kg): --    I and O's:    11-12 @ 07:01  -  11-13 @ 07:00  --------------------------------------------------------  IN: 1060 mL / OUT: 600 mL / NET: 460 mL    11-13 @ 07:01  -  11-13 @ 14:22  --------------------------------------------------------  IN: 346.2 mL / OUT: 0 mL / NET: 346.2 mL          PHYSICAL EXAM:  Constitutional: critically ill, trach to vent   HEENT: + NGT, + tracheostomy   Respiratory: coarse BS  Cardiovascular: tachy   Gastrointestinal: BS+, soft, NT/ND  Extremities: ++ generalized edema  Neurological: UTO  : No Wheeler  Skin: No rashes  Access: TRISHA odom (accessed)    LABS:                        9.3    15.49 )-----------( 536      ( 13 Nov 2023 05:17 )             32.7     11-13    134<L>  |  101  |  44<H>  ----------------------------<  144<H>  5.7<H>   |  27  |  1.72<H>    Ca    8.7      13 Nov 2023 05:17  Phos  6.1     11-13  Mg     2.60     11-13    TPro  6.8  /  Alb  2.1<L>  /  TBili  <0.2  /  DBili  x   /  AST  49<H>  /  ALT  27  /  AlkPhos  398<H>  11-13          Urine Studies:  Urinalysis Basic - ( 13 Nov 2023 05:17 )    Color: x / Appearance: x / SG: x / pH: x  Gluc: 144 mg/dL / Ketone: x  / Bili: x / Urobili: x   Blood: x / Protein: x / Nitrite: x   Leuk Esterase: x / RBC: x / WBC x   Sq Epi: x / Non Sq Epi: x / Bacteria: x            RADIOLOGY & ADDITIONAL STUDIES:            Assessment and Plan:   · Assessment	    IMPRESSION: 73F w/ HTN, DM2, CVA, breast CA-bilateral mastectomy, recurrent aspiration pneumonia/respiratory failure, and CKD, 8/11/23 p/w acute hypercapnic respiratory failure; c/b RUSS    (1)Renal - RUSS on CKD4 ==>now newly ESRD. HD now     (2)Hyponatremia - Na+ stable    (3)CV- tenuous hemodynamics such with each passing week we have more difficulty performing HD/achieving UF, persistent issue. BP stable with Jacki, though continues to need pressors for HD    (4)ID- BCx from 10/14/23 NGTD, BC (10/17) NGTD - s/p IV abx     (5)Pulm- trach/vent-dependent    (6)Anemia- hgb < goal though stable, epo with HD    (7)Hyperkalemia - should improve with HD, may need to change TF if hyperkalemia persists     RECOMMEND:  (1)HD now: pressors and sodium modelling as needed to keep SBP>90; Epogen with HD   (2)No objection to tunneled catheter tomorrow   (3)Dose new meds for GFR <10/HD      Tere Arauz NP  [facility]  [phone]          [facility] [phone]        Patient seen and examined on HD, levophed started at beginning of HD for hypotension now off 's.         MEDICATIONS  (STANDING):  albuterol/ipratropium for Nebulization 3 milliLiter(s) Nebulizer every 6 hours  artificial tears (preservative free) Ophthalmic Solution 1 Drop(s) Both EYES every 4 hours  atorvastatin 10 milliGRAM(s) Oral at bedtime  calamine/zinc oxide Lotion 1 Application(s) Topical daily  chlorhexidine 0.12% Liquid 15 milliLiter(s) Oral Mucosa every 12 hours  chlorhexidine 2% Cloths 1 Application(s) Topical daily  dextrose 5%. 1000 milliLiter(s) (50 mL/Hr) IV Continuous <Continuous>  dextrose 5%. 1000 milliLiter(s) (100 mL/Hr) IV Continuous <Continuous>  dextrose 50% Injectable 12.5 Gram(s) IV Push once  dextrose 50% Injectable 25 Gram(s) IV Push once  dextrose 50% Injectable 25 Gram(s) IV Push once  dextrose 50% Injectable 25 Gram(s) IV Push once  droxidopa 600 milliGRAM(s) Oral every 8 hours  epoetin odalis (EPOGEN) Injectable 07644 Unit(s) IV Push <User Schedule>  ferrous    sulfate Liquid 300 milliGRAM(s) Oral daily  glucagon  Injectable 1 milliGRAM(s) IntraMuscular once  insulin lispro (ADMELOG) corrective regimen sliding scale   SubCutaneous every 6 hours  insulin NPH human recombinant 6 Unit(s) SubCutaneous every 6 hours  midodrine. 40 milliGRAM(s) Oral <User Schedule>  norepinephrine Infusion 0.05 MICROgram(s)/kG/Min (6.23 mL/Hr) IV Continuous <Continuous>  pantoprazole  Injectable 40 milliGRAM(s) IV Push two times a day  petrolatum Ophthalmic Ointment 1 Application(s) Both EYES four times a day  sodium chloride 1 Gram(s) Oral two times a day  sodium chloride 3%  Inhalation 4 milliLiter(s) Inhalation every 6 hours      VITAL:  T(C): , Max: 37.2 (11-12-23 @ 16:00)  T(F): , Max: 99 (11-12-23 @ 16:00)  HR: 109 (11-13-23 @ 13:00)  BP: --  BP(mean): --  RR: 19 (11-13-23 @ 13:00)  SpO2: 96% (11-13-23 @ 13:00)  Wt(kg): --    I and O's:    11-12 @ 07:01  -  11-13 @ 07:00  --------------------------------------------------------  IN: 1060 mL / OUT: 600 mL / NET: 460 mL    11-13 @ 07:01  -  11-13 @ 14:22  --------------------------------------------------------  IN: 346.2 mL / OUT: 0 mL / NET: 346.2 mL          PHYSICAL EXAM:  Constitutional: critically ill, trach to vent   HEENT: + NGT, + tracheostomy   Respiratory: coarse BS  Cardiovascular: tachy   Gastrointestinal: BS+, soft, NT/ND  Extremities: ++ generalized edema  Neurological: UTO  : No Wheeler  Skin: No rashes  Access: TRISHA odom (accessed)    LABS:                        9.3    15.49 )-----------( 536      ( 13 Nov 2023 05:17 )             32.7     11-13    134<L>  |  101  |  44<H>  ----------------------------<  144<H>  5.7<H>   |  27  |  1.72<H>    Ca    8.7      13 Nov 2023 05:17  Phos  6.1     11-13  Mg     2.60     11-13    TPro  6.8  /  Alb  2.1<L>  /  TBili  <0.2  /  DBili  x   /  AST  49<H>  /  ALT  27  /  AlkPhos  398<H>  11-13          Urine Studies:  Urinalysis Basic - ( 13 Nov 2023 05:17 )    Color: x / Appearance: x / SG: x / pH: x  Gluc: 144 mg/dL / Ketone: x  / Bili: x / Urobili: x   Blood: x / Protein: x / Nitrite: x   Leuk Esterase: x / RBC: x / WBC x   Sq Epi: x / Non Sq Epi: x / Bacteria: x            RADIOLOGY & ADDITIONAL STUDIES:            Assessment and Plan:   · Assessment	    IMPRESSION: 73F w/ HTN, DM2, CVA, breast CA-bilateral mastectomy, recurrent aspiration pneumonia/respiratory failure, and CKD, 8/11/23 p/w acute hypercapnic respiratory failure; c/b RUSS    (1)Renal - RUSS on CKD4 ==>now newly ESRD. HD now     (2)Hyponatremia - Na+ stable    (3)CV- tenuous hemodynamics such with each passing week we have more difficulty performing HD/achieving UF, persistent issue. BP stable with Jacki, though continues to need pressors for HD    (4)ID- BCx from 10/14/23 NGTD, BC (10/17) NGTD - s/p IV abx     (5)Pulm- trach/vent-dependent    (6)Anemia- hgb < goal though stable, epo with HD    (7)Hyperkalemia - should improve with HD, may need to change TF if hyperkalemia persists     RECOMMEND:  (1)HD now: pressors and sodium modelling as needed to keep SBP>90; Epogen with HD   (2)No objection to tunneled catheter tomorrow   (3)Dose new meds for GFR <10/HD      Tere Arauz NP  [facility]  [phone]       RENAL ATTENDING NOTE  Patient seen and examined with NP. Agree with assessment and plan as above.    Jimmy Colon MD  [facility]  [phone]

## 2023-11-13 NOTE — PROGRESS NOTE ADULT - SUBJECTIVE AND OBJECTIVE BOX
S:  Pt seen and examined.       Medications: MEDICATIONS  (STANDING):  albuterol/ipratropium for Nebulization 3 milliLiter(s) Nebulizer every 6 hours  artificial tears (preservative free) Ophthalmic Solution 1 Drop(s) Both EYES every 4 hours  atorvastatin 10 milliGRAM(s) Oral at bedtime  calamine/zinc oxide Lotion 1 Application(s) Topical daily  chlorhexidine 0.12% Liquid 15 milliLiter(s) Oral Mucosa every 12 hours  chlorhexidine 2% Cloths 1 Application(s) Topical daily  dextrose 5%. 1000 milliLiter(s) (50 mL/Hr) IV Continuous <Continuous>  dextrose 5%. 1000 milliLiter(s) (100 mL/Hr) IV Continuous <Continuous>  dextrose 50% Injectable 12.5 Gram(s) IV Push once  dextrose 50% Injectable 25 Gram(s) IV Push once  dextrose 50% Injectable 25 Gram(s) IV Push once  dextrose 50% Injectable 25 Gram(s) IV Push once  droxidopa 600 milliGRAM(s) Oral every 8 hours  epoetin odalis (EPOGEN) Injectable 52765 Unit(s) IV Push <User Schedule>  ferrous    sulfate Liquid 300 milliGRAM(s) Oral daily  glucagon  Injectable 1 milliGRAM(s) IntraMuscular once  insulin lispro (ADMELOG) corrective regimen sliding scale   SubCutaneous every 6 hours  insulin NPH human recombinant 6 Unit(s) SubCutaneous every 6 hours  midodrine. 40 milliGRAM(s) Oral <User Schedule>  norepinephrine Infusion 0.05 MICROgram(s)/kG/Min (6.23 mL/Hr) IV Continuous <Continuous>  pantoprazole  Injectable 40 milliGRAM(s) IV Push two times a day  petrolatum Ophthalmic Ointment 1 Application(s) Both EYES four times a day  sodium chloride 1 Gram(s) Oral two times a day  sodium chloride 3%  Inhalation 4 milliLiter(s) Inhalation every 6 hours    MEDICATIONS  (PRN):  acetaminophen   Oral Liquid .. 650 milliGRAM(s) Oral every 6 hours PRN Temp greater or equal to 38C (100.4F), Mild Pain (1 - 3)  dextrose Oral Gel 15 Gram(s) Oral once PRN Blood Glucose LESS THAN 70 milliGRAM(s)/deciliter  diphenhydrAMINE Elixir 50 milliGRAM(s) Oral once PRN Rash and/or Itching  droxidopa 600 milliGRAM(s) Oral <User Schedule> PRN prior to hemodialysis  midodrine. 40 milliGRAM(s) Oral once PRN 1 Hour Pre HD  sodium chloride 0.9% Bolus. 250 milliLiter(s) IV Bolus every 5 minutes PRN SBP LESS THAN or EQUAL to 90 mmHg  sodium chloride 0.9% lock flush 10 milliLiter(s) IV Push every 1 hour PRN Pre/post blood products, medications, blood draw, and to maintain line patency       Vitals:  Vital Signs Last 24 Hrs  T(C): 36.3 (13 Nov 2023 12:00), Max: 37.2 (12 Nov 2023 16:00)  T(F): 97.3 (13 Nov 2023 12:00), Max: 99 (12 Nov 2023 16:00)  HR: 109 (13 Nov 2023 13:00) (103 - 120)  BP: --  BP(mean): --  RR: 19 (13 Nov 2023 13:00) (11 - 26)  SpO2: 96% (13 Nov 2023 13:00) (92% - 100%)    Parameters below as of 13 Nov 2023 14:00  Patient On (Oxygen Delivery Method): ventilator    O2 Concentration (%): 40        LABS:                          9.3    15.49 )-----------( 536      ( 13 Nov 2023 05:17 )             32.7     11-13    134<L>  |  101  |  44<H>  ----------------------------<  144<H>  5.7<H>   |  27  |  1.72<H>    Ca    8.7      13 Nov 2023 05:17  Phos  6.1     11-13  Mg     2.60     11-13    TPro  6.8  /  Alb  2.1<L>  /  TBili  <0.2  /  DBili  x   /  AST  49<H>  /  ALT  27  /  AlkPhos  398<H>  11-13    LIVER FUNCTIONS - ( 13 Nov 2023 05:17 )  Alb: 2.1 g/dL / Pro: 6.8 g/dL / ALK PHOS: 398 U/L / ALT: 27 U/L / AST: 49 U/L / GGT: x             Urinalysis Basic - ( 13 Nov 2023 05:17 )    Color: x / Appearance: x / SG: x / pH: x  Gluc: 144 mg/dL / Ketone: x  / Bili: x / Urobili: x   Blood: x / Protein: x / Nitrite: x   Leuk Esterase: x / RBC: x / WBC x   Sq Epi: x / Non Sq Epi: x / Bacteria: x          Neurological Exam:  Mental Status: Eyes closed, minimal spont eye opening off sedation. Does not follow commands,  + trach to vent and NGT   Cranial Nerves: PERRL slightly sluggish, R facial twitching noted by mouth lessened today- ? suppressible. occasional head dystonia  Motor: Moves upper extremities spontaneously R >L, tremor R > L  no movement noted in lowers  Sensation: WD to noxious x4    I personally reviewed the below data/images/labs:        < from: CT Head No Cont (08.15.23 @ 17:20) >    ACC: [id number] EXAM:  CT BRAIN   ORDERED BY: MINAL SAM     PROCEDURE DATE:  08/15/2023          INTERPRETATION:  Clinical indication: Change in neuro exam.    Multiple axial sections were performed from base to vertex without   contrast enhancement. Coronal and sagittal reconstructions were   reformatted well.    This exam is compared prior head CT performed on August 11, 2023    Parenchymal volume loss and chronic microvessel ischemic changes are   again seen.    Abnormal low-attenuation involving the left occipital cortical   subcortical region is again seen. 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    No evidence of acute hemorrhage mass or mass effect is seen.    Evaluation of the osseous structures with appropriate window demonstrates   sclerotic changes about the left mastoid region which appears stable.   Opacification left middle ear region is again seen.    Patient is status post bilateral cataract surgery.    Impression: Stable exam.    < end of copied text >    vEEG:    Clinical Impression:  - Severe diffuse non-specific cerebral dysfunction  - There were no epileptiform abnormalities or seizures recorded.        CTH 8/11:    VENTRICLES AND SULCI: Age-appropriate involutional change  INTRA-AXIAL:  Old left PCA infarct as seen on the prior unchanged.   Microvascular ischemic changes involving the periventricular and   subcortical white matter as seen previously  EXTRA-AXIAL:  No mass or collection is seen.  VISUALIZED SINUSES:  Clear.  VISUALIZED MASTOIDS: Left mastoid sclerosis  CALVARIUM: Infiltrative appearance tothe calvarium may be indicative of   marrow infiltration on the basis of patient's known diagnosis of breast   cancer. MISCELLANEOUS:  None.    IMPRESSION:  No significant interval change compared with 7/17/2023 in   left PCA infarct which is old. Microvascular ischemic changes involving   the periventricular and subcortical white matter as seen   previously.Questionable lesions at the level of the calvarium related to   possible breast CA. Clinical correlation recommended.    --- End of Report ---      ct< from: CT Head No Cont (09.26.23 @ 21:02) >  IMPRESSION: Vague questionable areas of hypoattenuation within the   bilateral cerebellar hemispheres which may be compatible with   acute/subacute ischemia. Recommend further evaluation with a brain MRI   study, provided there are no MRI contraindications.    No acute intracranial hemorrhage.    Previously seen questionable vague wedge-shaped area of hypoattenuation   in the left parietal lobe with artifactual secondary to motion and volume   averaging with a prominent sulcus.    Chronic left occipital lobe infarct.    < end of copied text >    < from: CT Head No Cont (10.03.23 @ 20:46) >    IMPRESSION:    No acute intracranial hemorrhage or mass effect. Evaluation of the   posterior fossa degraded by streak artifact from dental amalgam.   Lucencies in the bilateral cerebellum may be artifactual.    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    Bilateral middle ear and mastoid effusions which are also seen on prior   exam.    EEG Classification / Summary:  Abnormal EEG in the awake, drowsy states.   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   -No seizures

## 2023-11-13 NOTE — PROGRESS NOTE ADULT - ASSESSMENT
73F w/ IDDM2, HTN, Diabetic Nephropathic CKD, HFpEF, Breast Cancer s/p BL mastectomy, chemotherapy and radiation (2018), Respiratory and Cardiac Arrest (2018), left PCA occipital CVA with residual right hemiparesis with questionable embolic source s/p [employer] ILR, and dysphagia with aspiration in the past requiring long ICU stay, tracheostomy and PEG (now decannulated) who presented for respiratory failure second to volume overload from HF with progressive CKD requiring intubation/trach, course c/b VAP, ESBL and MSSA bacteremia, now admitted to the MICU due to inability to tolerate iHD and UF w/o pressor support.    NEUROLOGY  #AMS  Multiple etiologies including active infection vs CVA.   MR head performed w/ new infarct and old infarcts, EEG without seizure events but with high foci potential   - spot EEG given facial twitching: negative for epileptiform activity (10/31)  - deferring repeat MRI as unlikely to   - continue Lipitor  - keppra d/c'd 11/10 (was on as seizure ppx given multiple infarcts, no seizures on multiple EEG, stopped to ensure it is not contributing to sedation)  - aspirin held 10/11, likely in s/o GIB, Hgb stable not requiring pRBC since 11/4    CARDIOVASCULAR  # Septic vs vasoplegic shock   Etiology likely septic iso active infection. Possible component of vasoplegia, however no longer with active infection or on sedation. Off IV vasopressors.     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   - resolved    RESPIRATORY  # AHRF second to volume overload   - CKD vs HFpEF w/ hypercarbia 2/2 volume overload requiring intubation, trach, and HD initiation  - Continue on albuterol and chest PT  - Continue volume removal with HD      #tracheomalacia   - CT CHEST (9/8) with tracheomalacia, BL pleural effusions and continued consolidations     GI  # UGIB: last pRBC transfused 11/4 (10/14 before that)   - IV pantoprazole 40 mg BID  - maintain 2 large bore IVs, active type and screen  - monitor CBC and transfuse for Hgb <7  - monitor vital signs q4h    # Dysphagia   - NGT-TF     RENAL  # ESRD  - HD MWF TIW with midodrine and droxidopa prior  - ICU for vasopressor assisted volume removal, cap to 1 pressor and not offering CRRT due to comorbidities and prognosis   - f/u w/ nephrology: will continue to offer patient 1-pressor assisted HD as agreed prior, as long as she can tolerate HD maxed on 1 pressor, she will be considered iHD candidate  - IR planning permacath 11/14; procedure note and order placed, Eliquis held (last dose 11/11 PM)    INFECTIOUS DISEASE  #Sepsis: temp 101.2 on 11/2; sources include skin (poorly demarcated area of induration/erythema, enlarging on RLQ), pulm (copious sputum)  - Bcx, sputum cx sent  - repeat MRSA PCR neg  - ID re-consulted: abd edema likely 2/2 chronic panniculitis  - Abx:      --> Vanc (11/2) - MRSA neg, dc'd     --> Aztreo 2g (11/2) --> dc'd, 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 Coli sputum Cx, BAL w/ MSSA  - treated with abx   - eosinophilia workup: JORGE, ANCAs negative    HEME  # Anemia second to GIB vs renal disease   - multiple transfusions, last done 11/4    #Allergic transfusion reaction: per RCU documentation, developed erythematous rash across chest shortly after starting transfusion, blood bank consulted and diagnosed mild allergic rxn  - premedicate w/ benadryl and famotidine prior to future transfusions  - no issue w/ pRBC transfusion 11/4    VASCULAR   # LLE POP DVT   - on Eliquis (held as of 11/11 PM for Permacath as above), resume when able    # HD access  - TRISHA TAYLOR (9/27 - 10/21)  - TRISHA taylor replaced for HD (10/21 - )  - Permacath planned (11/14 - )    ONC   # Hx of Breast CA   - Patient dx in 2018 and s/p BL mastectomy (radical on right) and chemo and RT.     ENDOCRINE  # IDDM2   - Continue on NPH 6U with moderate ISS and adjust as needed  - monitor FS for goal -180 while inpatient    ETHICS/ GOC    - Attempted palliative discussion however family not interested and wishes for FULL CODE  - Family continues to want everything done despite inability to tolerate HD on multiple oral pressor  - Levo capped at 0.3 during HD

## 2023-11-13 NOTE — PROGRESS NOTE ADULT - ASSESSMENT
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       NEUROLOGY  #AMS  Multiple etiologies including active infection vs CVA.   MR head performed w/ new infarct and old infarcts, EEG without seizure events but with high foci potential   - f/u spot EEG given facial twitching: negative for epileptiform activity (10/31)  - deferring repeat MRI as unlikely to   - continue lipitor  - keppra stopped 11/10 (was on as seizure ppx given multiple infarcts, no seizures on multiple EEG, stopped to ensure it is not contributing to sedation)  - aspirin held 10/11, likely in s/o GIB, Hgb stable not requiring pRBC since 11/4      CARDIOVASCULAR  # Septic vs vasoplegic shock   Etiology likely septic iso active infection. Possible component of vasoplegic, however no longer with active infection or on sedation. Off IV vasopressors.   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   - resolved    RESPIRATORY  # AHRF second to volume overload   - CKD vs HFpEF w/ hypercarbia 2/2 volume overload requiring intubation, trach, and HD initiation  - Continue on albuterol and chest PT  - Continue volume removal with HD      #tracheomalacia   - CT CHEST (9/8) with tracheomalacia, BL pleural effusions and continued consolidations     GI  # UGIB: last pRBC transfused 11/4 (10/14 before that)   - Continue on PPI BID    # Dysphagia   - NGT-TF     RENAL  # ESRD  - HD MWF TIW with midodrine and droxidopa prior  - ICU for vasopressor assisted volume removal, cap to 1 pressor and not offering CRRT due to comorbidities and prognosis   - f/u w/ nephrology: will continue to offer patient 1-pressor assisted HD as agreed prior, as long as she can tolerate HD maxed on 1 pressor, she will be considered iHD candidate  - UF -2.5L w/ HD; tolerates w/ norepi gtt  - IR planning permacath 11/14; procedure note and order placed, eliquis held (last dose 11/11 PM)    INFECTIOUS DISEASE  #Sepsis: temp 101.2 on 11/2; sources include skin (poorly demarcated area of induration/erythema, enlarging on RLQ), pulm (copious sputum)  - Bcx, sputum cx sent  - repeat MRSA PCR neg  - ID re-consulted: abd edema likely 2/2 chronic panniculitis  - Abx:      --> Vanc (11/2) - MRSA neg, dc'd     --> Aztreo 2g (11/2) --> dc'd, 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 Coli sputum Cx, BAL w/ MSSA  - treated with abx   - eosinophilia workup: JORGE, ANCAs negative    HEME  # Anemia second to GIB vs renal disease   - multiple transfusions, last done 11/4    #Allergic transfusion reaction: per RCU documentation, developed erythematous rash across chest shortly after starting transfusion, blood bank consulted and diagnosed mild allergic rxn  - premedicate w/ benadryl and famotidine prior to future transfusions  - no issue w/ pRBC transfusion 11/4    VASCULAR   # LLE POP DVT   - on Eliquis (held as of 11/11 PM for procedure), resume when able    # HD access  - TRISHA TAYLOR (9/27 - 10/21)  - TRISHA taylor replaced for HD (10/21 - )  - Permacath planned (11/14 - )    ONC   # Hx of Breast CA   - Patient dx in 2018 and s/p BL mastectomy (radical on right) and chemo and RT.     ENDOCRINE  # IDDM2   - Continued on NPH with ISS, however noted with hypoglycemic episodes and NPH dc'ed.    - Finger sticks trending up off NPH.   - Continue on NPH 6U with moderate ISS.   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       NEUROLOGY  #AMS  Multiple etiologies including active infection vs CVA.   MR head performed w/ new infarct and old infarcts, EEG without seizure events but with high foci potential   - EEG given facial twitching: negative for epileptiform activity (10/31)  - deferring repeat MRI as unlikely to   - continue lipitor  - keppra stopped 11/10 (was on as seizure ppx given multiple infarcts, no seizures on multiple EEG, stopped to ensure it is not contributing to sedation)  - aspirin held 10/11, likely in s/o GIB, Hgb stable not requiring pRBC since 11/4  - patient A and O x 0 not following commands       CARDIOVASCULAR  # Septic vs vasoplegic shock   Etiology likely septic iso active infection. Possible component of vasoplegic, however no longer with active infection or on sedation. Off IV vasopressors.   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   - resolved    RESPIRATORY  # AHRF second to volume overload   - CKD vs HFpEF w/ hypercarbia 2/2 volume overload requiring intubation, trach, and HD initiation  - Continue on albuterol and chest PT  - Continue volume removal with HD-plan for 2L removal 11/13      #tracheomalacia   - CT CHEST (9/8) with tracheomalacia, BL pleural effusions and continued consolidations     GI  # GIB: last pRBC transfused 11/4 (10/14 before that)   - Continue on PPI BID  - patient with melena 11/13 but H/H stable will hold off on restarting eliquis     # Dysphagia   - NGT- tolerating feeds- changed to nepro was per nutrition  - NPO after midnight 11/13 for permacath       RENAL  # ESRD  - HD MWF TIW with midodrine and droxidopa prior  - ICU for vasopressor assisted volume removal, cap to 1 pressor and not offering CRRT due to comorbidities and prognosis   - f/u w/ nephrology: will continue to offer patient 1-pressor assisted HD as agreed prior, as long as she can tolerate HD maxed on 1 pressor, she will be considered iHD candidate  - UF -2.5L w/ HD; tolerates w/ norepi gtt  - IR planning permacath 11/14; procedure note and order placed, eliquis held (last dose 11/11 PM)    INFECTIOUS DISEASE  #Septic shock   #blood cx 9/1: MSSA with hypotension  repeat negative 9/4, 9/8 s/p 4 weeks of vanco/dapto through 10/2  #Colonized with  pseudomonas   -recent Bcx11/2 negative, sputum 11/2 with  pseudomonas- colonized   - repeat MRSA PCR neg  - afebrile now after completing course aztreonam 11/6  - monitoring off abx     # 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 Coli sputum Cx, BAL w/ MSSA  - treated with abx   - eosinophilia workup: JORGE, ANCAs negative    HEME  # Anemia second to GIB vs renal disease   - multiple transfusions, last done 11/4  - eliquis held for permacath last dose 11/11- patient also having melena     #Allergic transfusion reaction: per RCU documentation, developed erythematous rash across chest shortly after starting transfusion, blood bank consulted and diagnosed mild allergic rxn  - premedicate w/ benadryl and famotidine prior to future transfusions  - no issue w/ pRBC transfusion 11/4    VASCULAR   # LLE POP DVT   - on Eliquis (held as of 11/11 PM for procedure and also patient having melena)   - SCDs    # HD access  - TRISHA TAYLOR (9/27 - 10/21)  - LIZKYRA taylor replaced for HD (10/21 - )  - Permacath planned (11/14 - )    ONC   # Hx of Breast CA   - Patient dx in 2018 and s/p BL mastectomy (radical on right) and chemo and RT.     ENDOCRINE  # IDDM2   - Continued on NPH with ISS, however noted with hypoglycemic episodes and NPH dc'ed.    - Finger sticks trending up off NPH.   - Continue on NPH 6U with moderate ISS.   - Adjust as need       ETHICS/ GOC    - Attempted palliative discussion however family not interested and wishes for FULL CODE  - Family continues to want everything done despite inability to tolerate HD on multiple oral pressor  - Levo capped at 0.3 during HD

## 2023-11-13 NOTE — PROGRESS NOTE ADULT - SUBJECTIVE AND OBJECTIVE BOX
Subjective: Patient seen and examined. No new events except as noted.   remains in ICU    at bedside       REVIEW OF SYSTEMS:  Unable to obtain     MEDICATIONS:  MEDICATIONS  (STANDING):  albuterol/ipratropium for Nebulization 3 milliLiter(s) Nebulizer every 6 hours  artificial tears (preservative free) Ophthalmic Solution 1 Drop(s) Both EYES every 4 hours  atorvastatin 10 milliGRAM(s) Oral at bedtime  calamine/zinc oxide Lotion 1 Application(s) Topical daily  chlorhexidine 0.12% Liquid 15 milliLiter(s) Oral Mucosa every 12 hours  chlorhexidine 2% Cloths 1 Application(s) Topical daily  dextrose 5%. 1000 milliLiter(s) (50 mL/Hr) IV Continuous <Continuous>  dextrose 5%. 1000 milliLiter(s) (100 mL/Hr) IV Continuous <Continuous>  dextrose 50% Injectable 12.5 Gram(s) IV Push once  dextrose 50% Injectable 25 Gram(s) IV Push once  dextrose 50% Injectable 25 Gram(s) IV Push once  dextrose 50% Injectable 25 Gram(s) IV Push once  droxidopa 600 milliGRAM(s) Oral every 8 hours  epoetin odalis (EPOGEN) Injectable 42894 Unit(s) IV Push <User Schedule>  ferrous    sulfate Liquid 300 milliGRAM(s) Oral daily  glucagon  Injectable 1 milliGRAM(s) IntraMuscular once  insulin lispro (ADMELOG) corrective regimen sliding scale   SubCutaneous every 6 hours  insulin NPH human recombinant 6 Unit(s) SubCutaneous every 6 hours  midodrine. 40 milliGRAM(s) Oral <User Schedule>  pantoprazole  Injectable 40 milliGRAM(s) IV Push two times a day  petrolatum Ophthalmic Ointment 1 Application(s) Both EYES four times a day  sodium chloride 1 Gram(s) Oral two times a day  sodium chloride 3%  Inhalation 4 milliLiter(s) Inhalation every 6 hours      PHYSICAL EXAM:  T(C): 36.6 (11-13-23 @ 08:00), Max: 37.2 (11-12-23 @ 12:00)  HR: 110 (11-13-23 @ 10:58) (103 - 120)  BP: --  RR: 24 (11-13-23 @ 10:00) (11 - 29)  SpO2: 99% (11-13-23 @ 10:58) (93% - 100%)  Wt(kg): --  I&O's Summary    12 Nov 2023 07:01  -  13 Nov 2023 07:00  --------------------------------------------------------  IN: 1060 mL / OUT: 600 mL / NET: 460 mL    13 Nov 2023 07:01  -  13 Nov 2023 11:09  --------------------------------------------------------  IN: 190 mL / OUT: 0 mL / NET: 190 mL          Appearance: NAD, +trach  +NGT  HEENT: dry oral mucosa  Lymphatic: No lymphadenopathy  Cardiovascular: Normal S1 S2, No JVD, No murmurs, No edema  Respiratory: Decreased BS, + trach to vent	  Neuro: opens eyes  Gastrointestinal: Soft, Non-tender, + BS, + NGT  Skin: No rashes, No ecchymoses, No cyanosis	  Extremities: No strength/ROM 2/2 sedation, + BL LE edema  Vascular: Peripheral pulses palpable 2+ bilaterally  Anasarca     Mode: AC/ CMV (Assist Control/ Continuous Mandatory Ventilation), RR (machine): 24, FiO2: 40, PEEP: 8, ITime: 0.8, MAP: 19, PC: 35, PIP: 42  Plateau pressure:   P/F ratio:       LABS:    CARDIAC MARKERS:                                9.3    15.49 )-----------( 536      ( 13 Nov 2023 05:17 )             32.7     11-13    134<L>  |  101  |  44<H>  ----------------------------<  144<H>  5.7<H>   |  27  |  1.72<H>    Ca    8.7      13 Nov 2023 05:17  Phos  6.1     11-13  Mg     2.60     11-13    TPro  6.8  /  Alb  2.1<L>  /  TBili  <0.2  /  DBili  x   /  AST  49<H>  /  ALT  27  /  AlkPhos  398<H>  11-1            TELEMETRY: 	 SR   ECG:  	  RADIOLOGY:   DIAGNOSTIC TESTING:  [ ] Echocardiogram:  [ ]  Catheterization:  [ ] Stress Test:    OTHER:

## 2023-11-13 NOTE — PROGRESS NOTE ADULT - SUBJECTIVE AND OBJECTIVE BOX
*******************************  Angelina Knight MD (PGY-2)  Internal Medicine  Contact via [employer] TEAMS  [facility] Pager: [phone]  [facility] Pager: [phone]  *******************************    MICU PROGRESS NOTE    INTERVAL HPI/OVERNIGHT EVENTS: No acute events overnight.    SUBJECTIVE: Patient seen and examined at bedside.     MEDICATIONS  (STANDING):  albuterol/ipratropium for Nebulization 3 milliLiter(s) Nebulizer every 6 hours  artificial tears (preservative free) Ophthalmic Solution 1 Drop(s) Both EYES every 4 hours  atorvastatin 10 milliGRAM(s) Oral at bedtime  calamine/zinc oxide Lotion 1 Application(s) Topical daily  chlorhexidine 0.12% Liquid 15 milliLiter(s) Oral Mucosa every 12 hours  chlorhexidine 2% Cloths 1 Application(s) Topical daily  dextrose 5%. 1000 milliLiter(s) (50 mL/Hr) IV Continuous <Continuous>  dextrose 5%. 1000 milliLiter(s) (100 mL/Hr) IV Continuous <Continuous>  dextrose 50% Injectable 12.5 Gram(s) IV Push once  dextrose 50% Injectable 25 Gram(s) IV Push once  dextrose 50% Injectable 25 Gram(s) IV Push once  dextrose 50% Injectable 25 Gram(s) IV Push once  droxidopa 600 milliGRAM(s) Oral every 8 hours  epoetin odalis (EPOGEN) Injectable 79467 Unit(s) IV Push <User Schedule>  ferrous    sulfate Liquid 300 milliGRAM(s) Oral daily  glucagon  Injectable 1 milliGRAM(s) IntraMuscular once  insulin lispro (ADMELOG) corrective regimen sliding scale   SubCutaneous every 6 hours  insulin NPH human recombinant 6 Unit(s) SubCutaneous every 6 hours  midodrine. 40 milliGRAM(s) Oral <User Schedule>  pantoprazole  Injectable 40 milliGRAM(s) IV Push two times a day  petrolatum Ophthalmic Ointment 1 Application(s) Both EYES four times a day  sodium chloride 1 Gram(s) Oral two times a day  sodium chloride 3%  Inhalation 4 milliLiter(s) Inhalation every 6 hours  sodium zirconium cyclosilicate 10 Gram(s) Oral once    MEDICATIONS  (PRN):  acetaminophen   Oral Liquid .. 650 milliGRAM(s) Oral every 6 hours PRN Temp greater or equal to 38C (100.4F), Mild Pain (1 - 3)  dextrose Oral Gel 15 Gram(s) Oral once PRN Blood Glucose LESS THAN 70 milliGRAM(s)/deciliter  diphenhydrAMINE Elixir 50 milliGRAM(s) Oral once PRN Rash and/or Itching  droxidopa 600 milliGRAM(s) Oral <User Schedule> PRN prior to hemodialysis  midodrine. 40 milliGRAM(s) Oral once PRN 1 Hour Pre HD  sodium chloride 0.9% Bolus. 250 milliLiter(s) IV Bolus every 5 minutes PRN SBP LESS THAN or EQUAL to 90 mmHg  sodium chloride 0.9% lock flush 10 milliLiter(s) IV Push every 1 hour PRN Pre/post blood products, medications, blood draw, and to maintain line patency    ALLERGIES:  Allergies    isoniazid (Rash)  nafcillin (Unknown)  hydrALAZINE (Rash)  vitamin E (Short breath; Urticaria; Hives)  doxycycline (Rash)  cefepime (Rash)  NIFEdipine (Urticaria; Hives)  Zosyn (Rash)    Intolerances    VITAL SIGNS:  ICU Vital Signs Last 24 Hrs  T(C): 36.9 (13 Nov 2023 00:00), Max: 37.2 (12 Nov 2023 08:00)  T(F): 98.5 (13 Nov 2023 00:00), Max: 99 (12 Nov 2023 08:00)  HR: 117 (13 Nov 2023 07:00) (103 - 120)  BP: --  BP(mean): --  ABP: 120/42 (13 Nov 2023 07:00) (120/42 - 150/54)  ABP(mean): 75 (13 Nov 2023 07:00) (75 - 99)  RR: 22 (13 Nov 2023 07:00) (11 - 29)  SpO2: 94% (13 Nov 2023 07:00) (94% - 100%)    O2 Parameters below as of 13 Nov 2023 07:00  Patient On (Oxygen Delivery Method): ventilator    O2 Concentration (%): 40    Mode: AC/ CMV (Assist Control/ Continuous Mandatory Ventilation), RR (machine): 24, FiO2: 40, PEEP: 8, ITime: 0.8, MAP: 19, PC: 35, PIP: 43  Plateau pressure:   P/F ratio:   I&O's Detail    12 Nov 2023 07:01  -  13 Nov 2023 07:00  --------------------------------------------------------  IN:    Enteral Tube Flush: 180 mL    Glucerna 1.5: 880 mL  Total IN: 1060 mL    OUT:    Stool (mL): 600 mL  Total OUT: 600 mL    Total NET: 460 mL    CAPILLARY BLOOD GLUCOSE    POCT Blood Glucose.: 145 mg/dL (13 Nov 2023 06:37)    ECG:    PHYSICAL EXAM:  General: NAD  HEENT: NC/AT; PERRL, clear conjunctiva; L ear w/ grainy white residue, no bleeding appreciated  Neck: supple  Respiratory: CTA b/l, +trach to vent  Cardiovascular: +S1/S2; RRR  Abdomen: soft, distended, warm to touch w/ area of erythema and induration RLQ (improving)  Extremities: WWP, 2+ peripheral pulses b/l; diffuse anasarca  Skin: normal color and turgor; no rash  Neurological: AO*0; rhythmic R facial and arm twitching    LABS:                        9.3    15.49 )-----------( 536      ( 13 Nov 2023 05:17 )             32.7     11-13    134<L>  |  101  |  44<H>  ----------------------------<  144<H>  5.7<H>   |  27  |  1.72<H>    Ca    8.7      13 Nov 2023 05:17  Phos  6.1     11-13  Mg     2.60     11-13    TPro  6.8  /  Alb  2.1<L>  /  TBili  <0.2  /  DBili  x   /  AST  49<H>  /  ALT  27  /  AlkPhos  398<H>  11-13    RADIOLOGY & ADDITIONAL TESTS:

## 2023-11-13 NOTE — PROGRESS NOTE ADULT - ASSESSMENT
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 While in MICU patient noted with worsening renal failure requiring HD initiation, CGE/ Melena s/p EGD 8/31 found with esophagitis and erosive gastropathy, new right cerebellar infarct and fevers second to ESBL COLI and MSSA VAP, MSSA bacteremia, left ear otitis externa and drug rxn to cephalosporins Course further complicated by prolonged vent time s/p tracheostomy 9/1 and transferred to RCU 9/3 completed by recurrent fevers with high PIP, respiratory acidosis and concern for volume overloaded.   CTH repeated 9/26 for concern for encephalopathy - has known now - chronic bilateral cerebellar infarcts, L PCA infarct. L parietal hypodensity seen on prior CT likely artifactual  Repeat CTH 10/3 - unchanged  EEG 10/5 with L posterocentral/temporal spikes/sharp waves - doubt true focal seizure upon further review of EEG     Impression:   Suspect underlying movement d/o - PD?  Metabolic encephalopathy related to shock, infection, doubt new stroke  Multiple embolic strokes s/p ILR without events  Dementia   MSSA bacteremia, s/p Daptomycin  recurrent L otitis externa  Acute popliteal DVT on Eliquis   Anemia     Recommendations   [] care per MICU for persistent hypotension, sepsis  [] Abx per ID   [] has multiple areas of epileptogenic potential on EEG, no clear seizure correlate seen.  Repeat EEG for R facial twitching negative.   [] would stop Keppra to see if mental status improves, doubt true seizures -> now off without improvement in mental status but getting HD today  [] consider MRI brain once stable but doubt will change mgmt  [] mgmt of hypotension per primary team, remains full code  [] Abx per ID  [] C/w home Atorvastatin 10 mg qhs   [] continue to address GOC with family, poor prognosis    Katty Charles DO  Vascular Neurology  Office [phone]

## 2023-11-13 NOTE — PROGRESS NOTE ADULT - SUBJECTIVE AND OBJECTIVE BOX
INTERVAL HPI/OVERNIGHT EVENTS:    HPI:    SUBJECTIVE: Patient seen and examined at bedside.     CONSTITUTIONAL: No weakness, fevers or chills  EYES/ENT: No visual changes;  No vertigo or throat pain   NECK: No pain or stiffness  RESPIRATORY: No cough, wheezing, hemoptysis; No shortness of breath  CARDIOVASCULAR: No chest pain or palpitations  GASTROINTESTINAL: No abdominal or epigastric pain. No nausea, vomiting, or hematemesis; No diarrhea or constipation. No melena or hematochezia.  GENITOURINARY: No dysuria, frequency or hematuria  NEUROLOGICAL: No numbness or weakness  SKIN: No itching, rashes    OBJECTIVE:    VITAL SIGNS:  ICU Vital Signs Last 24 Hrs  T(C): 36.6 (13 Nov 2023 08:00), Max: 37.2 (12 Nov 2023 12:00)  T(F): 97.9 (13 Nov 2023 08:00), Max: 99 (12 Nov 2023 16:00)  HR: 113 (13 Nov 2023 08:00) (103 - 120)  BP: --  BP(mean): --  ABP: 117/42 (13 Nov 2023 08:00) (117/42 - 150/54)  ABP(mean): 73 (13 Nov 2023 08:00) (73 - 99)  RR: 26 (13 Nov 2023 08:00) (11 - 29)  SpO2: 93% (13 Nov 2023 08:00) (93% - 100%)    O2 Parameters below as of 13 Nov 2023 09:00  Patient On (Oxygen Delivery Method): ventilator    O2 Concentration (%): 40      Mode: AC/ CMV (Assist Control/ Continuous Mandatory Ventilation), RR (machine): 24, FiO2: 40, PEEP: 8, ITime: 0.8, MAP: 19, PC: 35, PIP: 43    11-12 @ 07:01  -  11-13 @ 07:00  --------------------------------------------------------  IN: 1060 mL / OUT: 600 mL / NET: 460 mL    11-13 @ 07:01  -  11-13 @ 09:20  --------------------------------------------------------  IN: 110 mL / OUT: 0 mL / NET: 110 mL      CAPILLARY BLOOD GLUCOSE      POCT Blood Glucose.: 145 mg/dL (13 Nov 2023 06:37)      PHYSICAL EXAM:    General: NAD  HEENT: NC/AT; PERRL, clear conjunctiva  Neck: supple  Respiratory: CTA b/l  Cardiovascular: +S1/S2; RRR  Abdomen: soft, NT/ND; +BS x4  Extremities: WWP, 2+ peripheral pulses b/l; no LE edema  Skin: normal color and turgor; no rash  Neurological:    MEDICATIONS:  MEDICATIONS  (STANDING):  albuterol/ipratropium for Nebulization 3 milliLiter(s) Nebulizer every 6 hours  artificial tears (preservative free) Ophthalmic Solution 1 Drop(s) Both EYES every 4 hours  atorvastatin 10 milliGRAM(s) Oral at bedtime  calamine/zinc oxide Lotion 1 Application(s) Topical daily  chlorhexidine 0.12% Liquid 15 milliLiter(s) Oral Mucosa every 12 hours  chlorhexidine 2% Cloths 1 Application(s) Topical daily  dextrose 5%. 1000 milliLiter(s) (100 mL/Hr) IV Continuous <Continuous>  dextrose 5%. 1000 milliLiter(s) (50 mL/Hr) IV Continuous <Continuous>  dextrose 50% Injectable 25 Gram(s) IV Push once  dextrose 50% Injectable 25 Gram(s) IV Push once  dextrose 50% Injectable 25 Gram(s) IV Push once  dextrose 50% Injectable 12.5 Gram(s) IV Push once  droxidopa 600 milliGRAM(s) Oral every 8 hours  epoetin odalis (EPOGEN) Injectable 39994 Unit(s) IV Push <User Schedule>  ferrous    sulfate Liquid 300 milliGRAM(s) Oral daily  glucagon  Injectable 1 milliGRAM(s) IntraMuscular once  insulin lispro (ADMELOG) corrective regimen sliding scale   SubCutaneous every 6 hours  insulin NPH human recombinant 6 Unit(s) SubCutaneous every 6 hours  midodrine. 40 milliGRAM(s) Oral <User Schedule>  pantoprazole  Injectable 40 milliGRAM(s) IV Push two times a day  petrolatum Ophthalmic Ointment 1 Application(s) Both EYES four times a day  sodium chloride 1 Gram(s) Oral two times a day  sodium chloride 3%  Inhalation 4 milliLiter(s) Inhalation every 6 hours  sodium zirconium cyclosilicate 10 Gram(s) Oral once    MEDICATIONS  (PRN):  acetaminophen   Oral Liquid .. 650 milliGRAM(s) Oral every 6 hours PRN Temp greater or equal to 38C (100.4F), Mild Pain (1 - 3)  dextrose Oral Gel 15 Gram(s) Oral once PRN Blood Glucose LESS THAN 70 milliGRAM(s)/deciliter  diphenhydrAMINE Elixir 50 milliGRAM(s) Oral once PRN Rash and/or Itching  droxidopa 600 milliGRAM(s) Oral <User Schedule> PRN prior to hemodialysis  midodrine. 40 milliGRAM(s) Oral once PRN 1 Hour Pre HD  midodrine. 40 milliGRAM(s) Oral once PRN 1 Hour Pre HD  sodium chloride 0.9% Bolus. 250 milliLiter(s) IV Bolus every 5 minutes PRN SBP LESS THAN or EQUAL to 90 mmHg  sodium chloride 0.9% lock flush 10 milliLiter(s) IV Push every 1 hour PRN Pre/post blood products, medications, blood draw, and to maintain line patency      ALLERGIES:  Allergies    isoniazid (Rash)  nafcillin (Unknown)  hydrALAZINE (Rash)  vitamin E (Short breath; Urticaria; Hives)  doxycycline (Rash)  cefepime (Rash)  NIFEdipine (Urticaria; Hives)  Zosyn (Rash)    Intolerances        LABS:                        9.3    15.49 )-----------( 536      ( 13 Nov 2023 05:17 )             32.7     11-13    134<L>  |  101  |  44<H>  ----------------------------<  144<H>  5.7<H>   |  27  |  1.72<H>    Ca    8.7      13 Nov 2023 05:17  Phos  6.1     11-13  Mg     2.60     11-13    TPro  6.8  /  Alb  2.1<L>  /  TBili  <0.2  /  DBili  x   /  AST  49<H>  /  ALT  27  /  AlkPhos  398<H>  11-13      Urinalysis Basic - ( 13 Nov 2023 05:17 )    Color: x / Appearance: x / SG: x / pH: x  Gluc: 144 mg/dL / Ketone: x  / Bili: x / Urobili: x   Blood: x / Protein: x / Nitrite: x   Leuk Esterase: x / RBC: x / WBC x   Sq Epi: x / Non Sq Epi: x / Bacteria: x        RADIOLOGY & ADDITIONAL TESTS: Reviewed. INTERVAL HPI/OVERNIGHT EVENTS: No acute overnight events.     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       SUBJECTIVE: Patient seen and examined at bedside.   ROS: Unable to assess as patient trached/ poor MS     OBJECTIVE:    VITAL SIGNS:  ICU Vital Signs Last 24 Hrs  T(C): 36.6 (13 Nov 2023 08:00), Max: 37.2 (12 Nov 2023 12:00)  T(F): 97.9 (13 Nov 2023 08:00), Max: 99 (12 Nov 2023 16:00)  HR: 113 (13 Nov 2023 08:00) (103 - 120)  BP: --  BP(mean): --  ABP: 117/42 (13 Nov 2023 08:00) (117/42 - 150/54)  ABP(mean): 73 (13 Nov 2023 08:00) (73 - 99)  RR: 26 (13 Nov 2023 08:00) (11 - 29)  SpO2: 93% (13 Nov 2023 08:00) (93% - 100%)    O2 Parameters below as of 13 Nov 2023 09:00  Patient On (Oxygen Delivery Method): ventilator    O2 Concentration (%): 40      Mode: AC/ CMV (Assist Control/ Continuous Mandatory Ventilation), RR (machine): 24, FiO2: 40, PEEP: 8, ITime: 0.8, MAP: 19, PC: 35, PIP: 43    11-12 @ 07:01  -  11-13 @ 07:00  --------------------------------------------------------  IN: 1060 mL / OUT: 600 mL / NET: 460 mL    11-13 @ 07:01  - 11-13 @ 09:20  --------------------------------------------------------  IN: 110 mL / OUT: 0 mL / NET: 110 mL      CAPILLARY BLOOD GLUCOSE      POCT Blood Glucose.: 145 mg/dL (13 Nov 2023 06:37)      PHYSICAL EXAM:    General: NAD  HEENT: NC/AT; PERRL, clear conjunctiva;  Neck: trach in place   Respiratory: CTA b/l  Cardiovascular: +S1/S2; RRR  Abdomen: soft, distended, + BS  Extremities: WWP, 2+ peripheral pulses b/l; diffuse anasarca  Skin: normal color and turgor; no rash  Neurological: AO*0; not following commands     MEDICATIONS:  MEDICATIONS  (STANDING):  albuterol/ipratropium for Nebulization 3 milliLiter(s) Nebulizer every 6 hours  artificial tears (preservative free) Ophthalmic Solution 1 Drop(s) Both EYES every 4 hours  atorvastatin 10 milliGRAM(s) Oral at bedtime  calamine/zinc oxide Lotion 1 Application(s) Topical daily  chlorhexidine 0.12% Liquid 15 milliLiter(s) Oral Mucosa every 12 hours  chlorhexidine 2% Cloths 1 Application(s) Topical daily  dextrose 5%. 1000 milliLiter(s) (100 mL/Hr) IV Continuous <Continuous>  dextrose 5%. 1000 milliLiter(s) (50 mL/Hr) IV Continuous <Continuous>  dextrose 50% Injectable 25 Gram(s) IV Push once  dextrose 50% Injectable 25 Gram(s) IV Push once  dextrose 50% Injectable 25 Gram(s) IV Push once  dextrose 50% Injectable 12.5 Gram(s) IV Push once  droxidopa 600 milliGRAM(s) Oral every 8 hours  epoetin odalis (EPOGEN) Injectable 17176 Unit(s) IV Push <User Schedule>  ferrous    sulfate Liquid 300 milliGRAM(s) Oral daily  glucagon  Injectable 1 milliGRAM(s) IntraMuscular once  insulin lispro (ADMELOG) corrective regimen sliding scale   SubCutaneous every 6 hours  insulin NPH human recombinant 6 Unit(s) SubCutaneous every 6 hours  midodrine. 40 milliGRAM(s) Oral <User Schedule>  pantoprazole  Injectable 40 milliGRAM(s) IV Push two times a day  petrolatum Ophthalmic Ointment 1 Application(s) Both EYES four times a day  sodium chloride 1 Gram(s) Oral two times a day  sodium chloride 3%  Inhalation 4 milliLiter(s) Inhalation every 6 hours  sodium zirconium cyclosilicate 10 Gram(s) Oral once    MEDICATIONS  (PRN):  acetaminophen   Oral Liquid .. 650 milliGRAM(s) Oral every 6 hours PRN Temp greater or equal to 38C (100.4F), Mild Pain (1 - 3)  dextrose Oral Gel 15 Gram(s) Oral once PRN Blood Glucose LESS THAN 70 milliGRAM(s)/deciliter  diphenhydrAMINE Elixir 50 milliGRAM(s) Oral once PRN Rash and/or Itching  droxidopa 600 milliGRAM(s) Oral <User Schedule> PRN prior to hemodialysis  midodrine. 40 milliGRAM(s) Oral once PRN 1 Hour Pre HD  midodrine. 40 milliGRAM(s) Oral once PRN 1 Hour Pre HD  sodium chloride 0.9% Bolus. 250 milliLiter(s) IV Bolus every 5 minutes PRN SBP LESS THAN or EQUAL to 90 mmHg  sodium chloride 0.9% lock flush 10 milliLiter(s) IV Push every 1 hour PRN Pre/post blood products, medications, blood draw, and to maintain line patency      ALLERGIES:  Allergies    isoniazid (Rash)  nafcillin (Unknown)  hydrALAZINE (Rash)  vitamin E (Short breath; Urticaria; Hives)  doxycycline (Rash)  cefepime (Rash)  NIFEdipine (Urticaria; Hives)  Zosyn (Rash)    Intolerances        LABS:                        9.3    15.49 )-----------( 536      ( 13 Nov 2023 05:17 )             32.7     11-13    134<L>  |  101  |  44<H>  ----------------------------<  144<H>  5.7<H>   |  27  |  1.72<H>    Ca    8.7      13 Nov 2023 05:17  Phos  6.1     11-13  Mg     2.60     11-13    TPro  6.8  /  Alb  2.1<L>  /  TBili  <0.2  /  DBili  x   /  AST  49<H>  /  ALT  27  /  AlkPhos  398<H>  11-13      Urinalysis Basic - ( 13 Nov 2023 05:17 )    Color: x / Appearance: x / SG: x / pH: x  Gluc: 144 mg/dL / Ketone: x  / Bili: x / Urobili: x   Blood: x / Protein: x / Nitrite: x   Leuk Esterase: x / RBC: x / WBC x   Sq Epi: x / Non Sq Epi: x / Bacteria: x        RADIOLOGY & ADDITIONAL TESTS: Reviewed.

## 2023-11-13 NOTE — PROGRESS NOTE ADULT - SUBJECTIVE AND OBJECTIVE BOX
Patient is a 73y old  Female who presents with a chief complaint of Respiratory distress (13 Nov 2023 14:28)      SUBJECTIVE / OVERNIGHT EVENTS: bordering in CT ICU, s/p Pressor assisted HD today, trache to vent, not responsive    MEDICATIONS  (STANDING):  albuterol/ipratropium for Nebulization 3 milliLiter(s) Nebulizer every 6 hours  artificial tears (preservative free) Ophthalmic Solution 1 Drop(s) Both EYES every 4 hours  atorvastatin 10 milliGRAM(s) Oral at bedtime  calamine/zinc oxide Lotion 1 Application(s) Topical daily  chlorhexidine 0.12% Liquid 15 milliLiter(s) Oral Mucosa every 12 hours  chlorhexidine 2% Cloths 1 Application(s) Topical daily  dextrose 5%. 1000 milliLiter(s) (50 mL/Hr) IV Continuous <Continuous>  dextrose 5%. 1000 milliLiter(s) (100 mL/Hr) IV Continuous <Continuous>  dextrose 50% Injectable 12.5 Gram(s) IV Push once  dextrose 50% Injectable 25 Gram(s) IV Push once  dextrose 50% Injectable 25 Gram(s) IV Push once  dextrose 50% Injectable 25 Gram(s) IV Push once  droxidopa 600 milliGRAM(s) Oral every 8 hours  epoetin odalis (EPOGEN) Injectable 92054 Unit(s) IV Push <User Schedule>  ferrous    sulfate Liquid 300 milliGRAM(s) Oral daily  glucagon  Injectable 1 milliGRAM(s) IntraMuscular once  insulin lispro (ADMELOG) corrective regimen sliding scale   SubCutaneous every 6 hours  insulin NPH human recombinant 6 Unit(s) SubCutaneous every 6 hours  midodrine. 40 milliGRAM(s) Oral <User Schedule>  norepinephrine Infusion 0.05 MICROgram(s)/kG/Min (6.23 mL/Hr) IV Continuous <Continuous>  pantoprazole  Injectable 40 milliGRAM(s) IV Push two times a day  petrolatum Ophthalmic Ointment 1 Application(s) Both EYES four times a day  sodium chloride 1 Gram(s) Oral two times a day  sodium chloride 3%  Inhalation 4 milliLiter(s) Inhalation every 6 hours    MEDICATIONS  (PRN):  acetaminophen   Oral Liquid .. 650 milliGRAM(s) Oral every 6 hours PRN Temp greater or equal to 38C (100.4F), Mild Pain (1 - 3)  dextrose Oral Gel 15 Gram(s) Oral once PRN Blood Glucose LESS THAN 70 milliGRAM(s)/deciliter  diphenhydrAMINE Elixir 50 milliGRAM(s) Oral once PRN Rash and/or Itching  droxidopa 600 milliGRAM(s) Oral <User Schedule> PRN prior to hemodialysis  midodrine. 40 milliGRAM(s) Oral once PRN 1 Hour Pre HD  sodium chloride 0.9% Bolus. 250 milliLiter(s) IV Bolus every 5 minutes PRN SBP LESS THAN or EQUAL to 90 mmHg  sodium chloride 0.9% lock flush 10 milliLiter(s) IV Push every 1 hour PRN Pre/post blood products, medications, blood draw, and to maintain line patency      Vital Signs Last 24 Hrs  T(F): 98.1 (11-13-23 @ 16:00), Max: 98.5 (11-12-23 @ 20:00)  HR: 112 (11-13-23 @ 19:12) (103 - 120)  BP: --  RR: 24 (11-13-23 @ 19:00) (16 - 26)  SpO2: 94% (11-13-23 @ 19:12) (92% - 100%)  Telemetry:   CAPILLARY BLOOD GLUCOSE      POCT Blood Glucose.: 172 mg/dL (13 Nov 2023 17:41)  POCT Blood Glucose.: 166 mg/dL (13 Nov 2023 11:21)  POCT Blood Glucose.: 145 mg/dL (13 Nov 2023 06:37)  POCT Blood Glucose.: 136 mg/dL (13 Nov 2023 05:20)  POCT Blood Glucose.: 133 mg/dL (12 Nov 2023 23:24)    I&O's Summary    12 Nov 2023 07:01  -  13 Nov 2023 07:00  --------------------------------------------------------  IN: 1060 mL / OUT: 600 mL / NET: 460 mL    13 Nov 2023 07:01  -  13 Nov 2023 19:59  --------------------------------------------------------  IN: 961.2 mL / OUT: 2800 mL / NET: -1838.8 mL        PHYSICAL EXAM:  GENERAL: NAD, well-developed  HEAD:  Atraumatic, Normocephalic  EYES: EOMI, PERRLA, conjunctiva and sclera clear  NECK: Supple, No JVD  CHEST/LUNG: Clear to auscultation bilaterally; No wheeze  HEART: Regular rate and rhythm; No murmurs, rubs, or gallops  ABDOMEN: Soft, Nontender, Nondistended; Bowel sounds present  EXTREMITIES:  2+ Peripheral Pulses, No clubbing, cyanosis, or edema  PSYCH: AAOx3  NEUROLOGY: non-focal  SKIN: No rashes or lesions    LABS:                        9.3    15.49 )-----------( 536      ( 13 Nov 2023 05:17 )             32.7     11-13    134<L>  |  101  |  44<H>  ----------------------------<  144<H>  5.7<H>   |  27  |  1.72<H>    Ca    8.7      13 Nov 2023 05:17  Phos  6.1     11-13  Mg     2.60     11-13    TPro  6.8  /  Alb  2.1<L>  /  TBili  <0.2  /  DBili  x   /  AST  49<H>  /  ALT  27  /  AlkPhos  398<H>  11-13          Urinalysis Basic - ( 13 Nov 2023 05:17 )    Color: x / Appearance: x / SG: x / pH: x  Gluc: 144 mg/dL / Ketone: x  / Bili: x / Urobili: x   Blood: x / Protein: x / Nitrite: x   Leuk Esterase: x / RBC: x / WBC x   Sq Epi: x / Non Sq Epi: x / Bacteria: x        RADIOLOGY & ADDITIONAL TESTS:    Imaging Personally Reviewed:    Consultant(s) Notes Reviewed:      Care Discussed with Consultants/Other Providers:

## 2023-11-13 NOTE — CHART NOTE - NSCHARTNOTEFT_GEN_A_CORE
Patient being seen for malnutrition follow up. Spoke with RN, MICU PA and obtained subjective information from extensive chart review.     Nutrition Interval Events: Pt now with new ESRD requiring HD and being followed by Nephrology. K+/Phos lab values have been trending up - suggest consideration of change in enteral formula to Nepro with Carb Steady for renal elecrolyte management. Recommend Nepro @ goal rate of 30 mL/hr x 24 hrs with LPS protein modular (15 gms protein, 100 kcals) x 2/day which will provide 1496 kcals (TF+LPS), 88 gms protein (TF+LPS), 523 mL free H2O in 720 mL total volume and give pt 23 kcals/1.3 gms protein of admit wt. Pt without any noted intolerance to ordered TF at this time (no nausea/vomiting or abdominal distention); fecal incontinence noted with last BM 11/12. Weight trend reflective of fluid shifts with edema presently 2+ generalized. FS over the past 24 hrs 133 - 181 mg/dl with NPH and Admelog insulins ordered for coverage. Pt remains at severe risk for malnutrition based on inadequate energy intake and ongoing moderate fluid accumulation. Nutrition plan of care as previously suggested. RDN services to remain available as needed.     CURRENT Diet : Diet, NPO with Tube Feed:   Tube Feeding Modality: Nasogastric  Glucerna 1.5 Judd (GLUCERNA1.5RTH)  Total Volume for 24 Hours (mL): 960  Continuous  Starting Tube Feed Rate {mL per Hour}: 10  Increase Tube Feed Rate by (mL): 10     Every 4 hours  Until Goal Tube Feed Rate (mL per Hour): 40  Tube Feed Duration (in Hours): 24  Tube Feed Start Time: 17:30  No Carb Prosource (1pkg = 15gms Protein)     Qty per Day:  1 (10-20-23 @ 18:51)    TF provides:  960mL total volume  1440 kcals  79g protein (+15 g additional protein from Liquid Protein Supplement (LPS) )= 94g total protein   729mL free water     Weight:                    Height:   61"                Upper 10% Ideal Body Weight:  115.5lbs / 52.5kg   78.8kg (11/6)  78.5kg (11/3)  80.4kg (10/31)  75.8kg (10/24)  78.2kg (10/19)  78.5kg (10/2)  83.2kg (9/24)  67.8kg (9/10)  58.0kg (8/31)  66.5kg (8/11 on admit)    __________________ Pertinent Medications__________________   MEDICATIONS  (STANDING):  albuterol/ipratropium for Nebulization 3 milliLiter(s) Nebulizer every 6 hours  artificial tears (preservative free) Ophthalmic Solution 1 Drop(s) Both EYES every 4 hours  atorvastatin 10 milliGRAM(s) Oral at bedtime  calamine/zinc oxide Lotion 1 Application(s) Topical daily  chlorhexidine 0.12% Liquid 15 milliLiter(s) Oral Mucosa every 12 hours  chlorhexidine 2% Cloths 1 Application(s) Topical daily  dextrose 5%. 1000 milliLiter(s) (50 mL/Hr) IV Continuous <Continuous>  dextrose 5%. 1000 milliLiter(s) (100 mL/Hr) IV Continuous <Continuous>  dextrose 50% Injectable 12.5 Gram(s) IV Push once  dextrose 50% Injectable 25 Gram(s) IV Push once  dextrose 50% Injectable 25 Gram(s) IV Push once  dextrose 50% Injectable 25 Gram(s) IV Push once  droxidopa 600 milliGRAM(s) Oral every 8 hours  epoetin odalis (EPOGEN) Injectable 60794 Unit(s) IV Push <User Schedule>  ferrous    sulfate Liquid 300 milliGRAM(s) Oral daily  glucagon  Injectable 1 milliGRAM(s) IntraMuscular once  insulin lispro (ADMELOG) corrective regimen sliding scale   SubCutaneous every 6 hours  insulin NPH human recombinant 6 Unit(s) SubCutaneous every 6 hours  midodrine. 40 milliGRAM(s) Oral <User Schedule>  norepinephrine Infusion 0.05 MICROgram(s)/kG/Min (6.23 mL/Hr) IV Continuous <Continuous>  pantoprazole  Injectable 40 milliGRAM(s) IV Push two times a day  petrolatum Ophthalmic Ointment 1 Application(s) Both EYES four times a day  sodium chloride 1 Gram(s) Oral two times a day  sodium chloride 3%  Inhalation 4 milliLiter(s) Inhalation every 6 hours    __________________ Pertinent Labs__________________   11-13 Na134 mmol/L<L> Glu 144 mg/dL<H> K+ 5.7 mmol/L<H> Cr  1.72 mg/dL<H> BUN 44 mg/dL<H> 11-13 Phos 6.1 mg/dL<H> 11-13 Alb 2.1 g/dL<L>      Skin: L inner ear wound    _____Estimated Energy Needs (based upper 10% Ideal Body Weight)_____  25-30 kcals/kg = 3402-6130 kcals/d  1.5-1.8.g protein/kg = 80-95g protein/d      Nutrition Diagnosis: Severe malnutrition  [x] ongoing    Goal(s):  1. Patient to meet > 75% estimated energy needs    Recommendations:   1. Nepro with Carb Steady @ goal rate of 30 mL/hr x 24 hrs with LPS protein modular (15 gms protein, 100 kcals) x 2./day     Monitoring and Evaluation:   1. Monitor weights, labs, BMs, skin integrity, enteral tolerance and edema.  2. RD services to remain available.     Education:  [x] Not warranted at present    Nelia Kohli, MS, RDN, CDN, CNSC on [place] TEAMS or Pager #[phone]

## 2023-11-13 NOTE — PROGRESS NOTE ADULT - ASSESSMENT
72 y/o F well known to me from my housecal outpt practice. she was admitted at [facility] 7/12-7/22 w aspiration PNA, was treated w CEFEPIME, developed an allergic rash,  dCHF, + MAC on AFB culture, had been progressively getting more and more lethargic and dyspneic at home since DC.   In  am of 8/11/23  ptn presented with respiratory distress w hypoxia and hypercarbia requiring intubation 2/2 volume overload +/- Asp PNA      Neuro   not responsive  Baseline MS AOx3, aphasic   - h/o CVA , on aspirin and statin . resumed w feeding tube, ASA resumed 8/26  - eeg  2/2 tremors, no sz focus, right facial twitching, EEG has been negative. 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 probably embolic in nature, did not tolerate full AC in the past, STEPHANIE is neg , no shunts observed  - ptn is poorly reactive, rpt scan done, no further CVA seen.     Cardiac   cardiology following  CHFpEF   TTE 7/2023 with EF 59%, with severe LVH and diastolic dysfunction   off Levo drip , now only during HD  on midodrine 40 mg qid, droxidopa 600 tid, additional 200 mg prior to HD,     Pulmonary   Acute hypercapnic and hypoxemic respiratory failure   prolonged intubation, now  trache to  vent, has copious secretions      Renal   on HD via L IJ Shiley, tunneled catheter when clinically stable  HD MWF, midodrine and pressor assisted,   permacath  scheduled to be place in IR on 11/14      GI  NPO  on tube feeds  UGI bleed 8/31,  EGD/Colonoscopy: erosive gastropathy, esophagitis on colonoscopy old blood seen no active bleeding, poor prep  NGT-TF  s/p 2UPRBC 11/4    Endocrine  on Insulin: NPH, Admelog    ID   complicated infectious hospital course  treated for MSSA bacteremia, aspiration PNA.  sputum + for pseudomonas, ptn was initially on zosyn but was allergic, then was switched to aztreonam   Aztreonam was switched to polymyxin for a possible allergy but ptn didnt have a reaction to aztreonam, she had a reaction to Zosyn.   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 externa resolved    Heme/Onc  on eliquis for  LEFT POPLITEAL VEIN ACUTE DVT , on ELiquis    Ethics  GOC - Discussed GOC with daughter and , they have opted in the past for full code. and she remains full code at present

## 2023-11-14 NOTE — PROGRESS NOTE ADULT - SUBJECTIVE AND OBJECTIVE BOX
Subjective: Patient seen and examined. No new events except as noted.   remains in ICU       REVIEW OF SYSTEMS:  Unable to obtain     MEDICATIONS:  MEDICATIONS  (STANDING):  albuterol/ipratropium for Nebulization 3 milliLiter(s) Nebulizer every 6 hours  artificial tears (preservative free) Ophthalmic Solution 1 Drop(s) Both EYES every 4 hours  atorvastatin 10 milliGRAM(s) Oral at bedtime  calamine/zinc oxide Lotion 1 Application(s) Topical daily  chlorhexidine 0.12% Liquid 15 milliLiter(s) Oral Mucosa every 12 hours  chlorhexidine 2% Cloths 1 Application(s) Topical daily  dextrose 5%. 1000 milliLiter(s) (50 mL/Hr) IV Continuous <Continuous>  dextrose 5%. 1000 milliLiter(s) (100 mL/Hr) IV Continuous <Continuous>  dextrose 50% Injectable 12.5 Gram(s) IV Push once  dextrose 50% Injectable 25 Gram(s) IV Push once  dextrose 50% Injectable 25 Gram(s) IV Push once  dextrose 50% Injectable 25 Gram(s) IV Push once  droxidopa 600 milliGRAM(s) Oral every 8 hours  epoetin odalis (EPOGEN) Injectable 62641 Unit(s) IV Push <User Schedule>  ferrous    sulfate Liquid 300 milliGRAM(s) Oral daily  glucagon  Injectable 1 milliGRAM(s) IntraMuscular once  insulin lispro (ADMELOG) corrective regimen sliding scale   SubCutaneous every 6 hours  insulin NPH human recombinant 6 Unit(s) SubCutaneous every 6 hours  midodrine. 40 milliGRAM(s) Oral <User Schedule>  norepinephrine Infusion 0.05 MICROgram(s)/kG/Min (6.23 mL/Hr) IV Continuous <Continuous>  pantoprazole  Injectable 40 milliGRAM(s) IV Push two times a day  petrolatum Ophthalmic Ointment 1 Application(s) Both EYES four times a day  sodium chloride 1 Gram(s) Oral two times a day  sodium chloride 3%  Inhalation 4 milliLiter(s) Inhalation every 6 hours      PHYSICAL EXAM:  T(C): 36.9 (11-14-23 @ 08:00), Max: 37.2 (11-13-23 @ 20:00)  HR: 114 (11-14-23 @ 08:00) (89 - 116)  BP: --  RR: 24 (11-14-23 @ 08:00) (16 - 24)  SpO2: 100% (11-14-23 @ 08:00) (92% - 100%)  Wt(kg): --  I&O's Summary    13 Nov 2023 07:01  -  14 Nov 2023 07:00  --------------------------------------------------------  IN: 1111.2 mL / OUT: 2900 mL / NET: -1788.8 mL      Appearance: NAD, +trach  +NGT  HEENT: dry oral mucosa  Lymphatic: No lymphadenopathy  Cardiovascular: Normal S1 S2, No JVD, No murmurs, No edema  Respiratory: Decreased BS, + trach to vent	  Neuro: opens eyes  Gastrointestinal: Soft, Non-tender, + BS, + NGT  Skin: No rashes, No ecchymoses, No cyanosis	  Extremities: No strength/ROM 2/2 sedation, + BL LE edema  Vascular: Peripheral pulses palpable 2+ bilaterally  Anasarca   Mode: AC/ CMV (Assist Control/ Continuous Mandatory Ventilation), RR (machine): 24, FiO2: 40, PEEP: 8, ITime: 0.8, MAP: 19, PC: 35, PIP: 42  Plateau pressure:   P/F ratio:     LABS:    CARDIAC MARKERS:                                9.1    16.68 )-----------( 464      ( 14 Nov 2023 02:53 )             31.2     11-14    136  |  100  |  31<H>  ----------------------------<  117<H>  5.0   |  28  |  1.33<H>    Ca    8.6      14 Nov 2023 02:53  Phos  4.5     11-14  Mg     2.40     11-14    TPro  6.5  /  Alb  2.0<L>  /  TBili  0.2  /  DBili  x   /  AST  45<H>  /  ALT  35<H>  /  AlkPhos  436<H>  11-14    proBNP:   Lipid Profile:   HgA1c:   TSH:             TELEMETRY: 	  SR ST  ECG:  	  RADIOLOGY:   DIAGNOSTIC TESTING:  [ ] Echocardiogram:  [ ]  Catheterization:  [ ] Stress Test:    OTHER:

## 2023-11-14 NOTE — PROGRESS NOTE ADULT - ASSESSMENT
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       NEUROLOGY  #AMS  Multiple etiologies including active infection vs CVA.   MR head performed w/ new infarct and old infarcts, EEG without seizure events but with high foci potential   - EEG given facial twitching: negative for epileptiform activity (10/31)  - deferring repeat MRI as unlikely to   - continue lipitor  - keppra stopped 11/10 (was on as seizure ppx given multiple infarcts, no seizures on multiple EEG, stopped to ensure it is not contributing to sedation)  - aspirin held 10/11, likely in s/o GIB, Hgb stable not requiring pRBC since 11/4  - patient A and O x 0 not following commands       CARDIOVASCULAR  # Septic vs vasoplegic shock   Etiology likely septic iso active infection. Possible component of vasoplegic, however no longer with active infection or on sedation. Off IV vasopressors.   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   - resolved    RESPIRATORY  # AHRF second to volume overload   - CKD vs HFpEF w/ hypercarbia 2/2 volume overload requiring intubation, trach, and HD initiation  - Continue on albuterol and chest PT  - Continue volume removal with HD-plan for 2L removal 11/13      #tracheomalacia   - CT CHEST (9/8) with tracheomalacia, BL pleural effusions and continued consolidations     GI  # GIB: last pRBC transfused 11/4 (10/14 before that)   - Continue on PPI BID  - patient with melena 11/13 but H/H stable will hold off on restarting eliquis     # Dysphagia   - NGT- tolerating feeds- changed to nepro was per nutrition  - NPO after midnight 11/13 for permacath       RENAL  # ESRD  - HD MWF TIW with midodrine and droxidopa prior  - ICU for vasopressor assisted volume removal, cap to 1 pressor and not offering CRRT due to comorbidities and prognosis   - f/u w/ nephrology: will continue to offer patient 1-pressor assisted HD as agreed prior, as long as she can tolerate HD maxed on 1 pressor, she will be considered iHD candidate  - UF -2.5L w/ HD; tolerates w/ norepi gtt  - IR planning permacath 11/14; procedure note and order placed, eliquis held (last dose 11/11 PM)    INFECTIOUS DISEASE  #Septic shock   #blood cx 9/1: MSSA with hypotension  repeat negative 9/4, 9/8 s/p 4 weeks of vanco/dapto through 10/2  #Colonized with  pseudomonas   -recent Bcx11/2 negative, sputum 11/2 with  pseudomonas- colonized   - repeat MRSA PCR neg  - afebrile now after completing course aztreonam 11/6  - monitoring off abx     # 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 Coli sputum Cx, BAL w/ MSSA  - treated with abx   - eosinophilia workup: JORGE, ANCAs negative    HEME  # Anemia second to GIB vs renal disease   - multiple transfusions, last done 11/4  - eliquis held for permacath last dose 11/11- patient also having melena     #Allergic transfusion reaction: per RCU documentation, developed erythematous rash across chest shortly after starting transfusion, blood bank consulted and diagnosed mild allergic rxn  - premedicate w/ benadryl and famotidine prior to future transfusions  - no issue w/ pRBC transfusion 11/4    VASCULAR   # LLE POP DVT   - on Eliquis (held as of 11/11 PM for procedure and also patient having melena)   - SCDs    # HD access  - TR[facility] T[facility] (9/27 - 10/21)  - LIZKYRA taylor replaced for HD (10/21 - )  - Permacath planned (11/14 - )    ONC   # Hx of Breast CA   - Patient dx in 2018 and s/p BL mastectomy (radical on right) and chemo and RT.     ENDOCRINE  # IDDM2   - Continued on NPH with ISS, however noted with hypoglycemic episodes and NPH dc'ed.    - Finger sticks trending up off NPH.   - Continue on NPH 6U with moderate ISS.   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       NEUROLOGY  #AMS  Multiple embolic strokes   L PCA Infarct   MR head performed w/ new infarct and old infarcts, EEG without seizure events but with high foci potential   - EEG given facial twitching: negative for epileptiform activity (10/31)  - deferring repeat MRI as unlikely to   - continue Lipitor, 11/14 -- restarted ASA 81 mg   - Keppra stopped 11/10 (was on as seizure ppx given multiple infarcts, no seizures on multiple EEG, stopped to ensure it is not contributing to sedation)  - aspirin held 10/11, likely in s/o GIB, Hgb stable not requiring PRBC since 11/4  - patient A and O x 0 not following commands       CARDIOVASCULAR  # Septic vs vasoplegic shock   Etiology likely septic iso active infection. Possible component of vasoplegic, however no longer with active infection or on sedation. Off IV vasopressors.   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   - resolved    RESPIRATORY  # AHRF second to volume overload   - CKD vs HFpEF w/ hypercarbia 2/2 volume overload requiring intubation, trach, and HD initiation  - Continue on albuterol and chest PT  - Continue volume removal with HD-plan for 2L removal 11/13      #tracheomalacia   - CT CHEST (9/8) with tracheomalacia, BL pleural effusions and continued consolidations     GI  # GIB: last pRBC transfused 11/4 (10/14 before that)   - Continue on PPI BID  - patient with melena 11/13 but H/H stable will hold off on restarting eliquis     # Dysphagia   - NGT- tolerating feeds- changed to nepro was per nutrition  - NPO after midnight 11/13 for permacath       RENAL  # ESRD  - HD MWF TIW with midodrine and droxidopa prior  - ICU for vasopressor assisted volume removal, cap to 1 pressor and not offering CRRT due to comorbidities and prognosis   - f/u w/ nephrology: will continue to offer patient 1-pressor assisted HD as agreed prior, as long as she can tolerate HD maxed on 1 pressor, she will be considered iHD candidate  - UF -2.5L w/ HD; tolerates w/ norepi gtt  - IR planning permacath 11/14; procedure note and order placed, eliquis held (last dose 11/11 PM)    INFECTIOUS DISEASE  #Septic shock   #blood cx 9/1: MSSA with hypotension  repeat negative 9/4, 9/8 s/p 4 weeks of vanco/dapto through 10/2  #Colonized with  pseudomonas   -recent Bcx11/2 negative, sputum 11/2 with  pseudomonas- colonized   - repeat MRSA PCR neg  - afebrile now after completing course aztreonam 11/6  - monitoring off abx     # 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 Coli sputum Cx, BAL w/ MSSA  - treated with abx   - eosinophilia workup: JORGE, ANCAs negative    HEME  # Anemia second to GIB vs renal disease   - multiple transfusions, last done 11/4  - eliquis held for permacath last dose 11/11- patient also having melena     #Allergic transfusion reaction: per RCU documentation, developed erythematous rash across chest shortly after starting transfusion, blood bank consulted and diagnosed mild allergic rxn  - premedicate w/ benadryl and famotidine prior to future transfusions  - no issue w/ pRBC transfusion 11/4    VASCULAR   # LLE POP DVT   - on Eliquis (held as of 11/11 PM for procedure and also patient having melena)   - SCDs    # HD access  - [facility] (9/27 - 10/21)  - TRISHA taylor replaced for HD (10/21 - )  - Permacath planned (11/14 - )    ONC   # Hx of Breast CA   - Patient dx in 2018 and s/p BL mastectomy (radical on right) and chemo and RT.     ENDOCRINE  # IDDM2   - Continued on NPH with ISS, however noted with hypoglycemic episodes and NPH dc'ed.    - Finger sticks trending up off NPH.   - Continue on NPH 6U with moderate ISS.   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       NEUROLOGY  #AMS  Multiple embolic strokes   L PCA Infarct   MR head performed w/ new infarct and old infarcts, EEG without seizure events but with high foci potential   - EEG given facial twitching: negative for epileptiform activity (10/31)  - deferring repeat MRI as unlikely to   - continue Lipitor,   - Keppra stopped 11/10 (was on as seizure ppx given multiple infarcts, no seizures on multiple EEG, stopped to ensure it is not contributing to sedation)  - aspirin held 10/11, likely in s/o GIB, Hgb stable not requiring PRBC since 11/4, today ASA 81 mg restarted;   - patient A and O x 0, opens eyes spontaneously, not following commands         CARDIOVASCULAR  # Septic vs vasoplegic shock   Etiology likely septic iso active infection. Possible component of vasoplegic, however no longer with active infection or on sedation. Off IV vasopressors.   Current regimen:   - droxidopa 600mg q8h with PRN 600mg ordered preHD  - midodrine 40mg q6 h; trial of additional 40mg prior to HD  - required levophed with HD 11/13 up to 0.05, and is been off pressors since 11/13, 16:00; however, pt is for HD tomorrow and repeatedly requiring pressors with HD, __ cont monitoring     # HFpEF w/ decompensation   > ECHO w/ EF 59 with severe LVH and diastolic dysfunction   - fluid overloaded, HD to remove fluids     HEENT   # Left ear OE with acute on chronic left-sided otomastoiditis  - noted to have white discharged/residue, increased from prior per patient's daughter; ENT reconsulted, resumed on cipro drops (10/31 - 11/7)  # 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   - resolved    RESPIRATORY  # AHRF second to volume overload   # Copious inline and oral thick tanned secretions   - CKD vs HFpEF w/ hypercarbia 2/2 volume overload requiring intubation, trach, and HD initiation  - Continue on albuterol and chest PT, cont 3 % INH, added IPV,   - Continue volume removal with HD-plan for 2L removal 11/13      #tracheomalacia   - CT CHEST (9/8) with tracheomalacia, BL pleural effusions and continued consolidations     GI  # GIB: last pRBC transfused 11/4 (10/14 before that)   - Continue on PPI BID  - patient with melena 11/13 but H/H stable will hold off on restarting eliquis ,   - -as per nursing staff, stool in black 300 in the rectal system in 24 hrs     # Dysphagia   - NGT- tolerating feeds, at goal- changed to nepro was per nutrition      RENAL  # ESRD, L IJ ilene   - HD MWF TIW with midodrine and droxidopa   - ICU for vasopressor assisted volume removal, cap to 1 pressor and not offering CRRT due to the comorbidities and prognosis   - f/u w/ nephrology: will continue to offer patient 1-pressor assisted HD as agreed prior, as long as she can tolerate HD maxed on 1 pressor, she will be considered iHD candidate  - UF -2.2 L w/ HD (11/13); tolerates w/ norepi gtt, required 0.05 on 11/13, next HD tomorrow   - IR was planning permacath 11/14; but procedure was cancelled due to pt in MICU, on vasopressors, and has leucocytosis      INFECTIOUS DISEASE  #Septic shock  T max 98.5, WBC - 13>14>15,   #blood cx 9/1: MSSA with hypotension  repeat negative 9/4, 9/8 s/p 4 weeks of vanco/dapto through 10/2  #Colonized with  pseudomonas   # SSA/ESBL E Coli PNA  # L otitis Extena s/p ENT debridement c/b Candida, s/p Meropenem and FLuconazole  # Proteus/ Pseudomonas in sputum, s/p Aztreonam   -recent Bcx 11/2 negative, sputum 11/2 with  pseudomonas- colonized   - repeat MRSA PCR neg  - afebrile now after completing course aztreonam 11/6  - monitoring off abx     # possible malignant otitis externa   # ESBL ECOLI and MSSA VAP c/b MSSA bacteremia   - hx of MSSA bacteremia, ESBL E. Coli sputum Cx, BAL w/ MSSA  - treated with abx   - eosinophilia workup: JORGE, ANCAs negative    HEME  # Anemia second to GIB vs renal disease   - multiple transfusions, last done 11/4  - Eliquis held 2/2 pt having melena, and now pt with black stools  -- Today restarted ASA 81 mg, restart DVT prophylaxis tomorrow, and Eliquis the day after if no bleeding and counts stable     #Allergic transfusion reaction: per RCU documentation, developed erythematous rash across chest shortly after starting transfusion, blood bank consulted and diagnosed mild allergic rxn  - premedicate w/ benadryl and famotidine prior to future transfusions  - no issue w/ pRBC transfusion 11/4    VASCULAR   # LLE POP DVT   - on Eliquis (held as of 11/11 PM for procedure and also patient having melena)   - SCDs    # HD access  - [facility] SHI[facility] (9/27 - 10/21)  - LIJ shiley replaced for HD (10/24 - ), >> L IJ shiley will be removed tomorrow after HD and new line will be placed when pt need next HD session   - Permacath planned today, but cancelled 2/2 pt in MICU, requiring pressors, and leucocytosis     ONC   # Hx of Breast CA   - Patient dx in 2018 and s/p BL mastectomy (radical on right) and chemo and RT.     ENDOCRINE  # IDDM2   - Continued on NPH with ISS, however noted with hypoglycemic episodes and NPH dc'ed.    - Finger sticks trending up off NPH.   - Continue on NPH 6U with moderate ISS.   -- >98>98__  NPH was held 2/2 pt was NPO>> restart TF and continue monitoring   - Adjust as needed      ETHICS/ GOC    - Attempted palliative discussion however family not interested and wishes for FULL CODE  - Family continues to want everything done despite inability to tolerate HD on multiple oral pressor  - Levo capped at 0.3 during HD   --Today daughter Rema at the bedside, daughter Pretti on the phone, and spouse on the phone were updated about pt's condition, all questions were answered

## 2023-11-14 NOTE — PROGRESS NOTE ADULT - ASSESSMENT
72 y/o F well known to me from my housecal outpt practice. she was admitted at [facility] 7/12-7/22 w aspiration PNA, was treated w CEFEPIME, developed an allergic rash,  dCHF, + MAC on AFB culture, had been progressively getting more and more lethargic and dyspneic at home since DC.   In  am of 8/11/23  ptn presented with respiratory distress w hypoxia and hypercarbia requiring intubation 2/2 volume overload +/- Asp PNA      Neuro   not responsive  Baseline MS AOx3, aphasic   - h/o CVA , on aspirin and statin . resumed w feeding tube, ASA resumed 8/26  - eeg  2/2 tremors, no sz focus, right facial twitching, EEG has been negative. 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 probably embolic in nature, did not tolerate full AC in the past, STEPHANIE is neg , no shunts observed  - ptn is poorly reactive, rpt scan done, no further CVA seen.     Cardiac   cardiology following  CHFpEF   TTE 7/2023 with EF 59%, with severe LVH and diastolic dysfunction   off Levo drip , now only during HD  on midodrine 40 mg qid, droxidopa 600 tid, additional 200 mg prior to HD,     Pulmonary   Acute hypercapnic and hypoxemic respiratory failure   prolonged intubation, now  trache to  vent, has copious secretions      Renal   on HD via L IJ Shiley, tunneled catheter when clinically stable  HD MWF, midodrine and pressor assisted,   permacath  scheduled to be place in IR on 11/14      GI  NPO  on tube feeds  UGI bleed 8/31,  EGD/Colonoscopy: erosive gastropathy, esophagitis on colonoscopy old blood seen no active bleeding, poor prep  NGT-TF  s/p 2UPRBC 11/4    Endocrine  on Insulin: NPH, Admelog    ID   complicated infectious hospital course  treated for MSSA bacteremia, aspiration PNA.  sputum + for pseudomonas, ptn was initially on zosyn but was allergic, then was switched to aztreonam   Aztreonam was switched to polymyxin for a possible allergy but ptn didnt have a reaction to aztreonam, she had a reaction to Zosyn.   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 externa resolved    Heme/Onc  on eliquis for  LEFT POPLITEAL VEIN ACUTE DVT , on ELiquis    Ethics  GOC - Discussed GOC with daughter and , they have opted in the past for full code. and she remains full code at present

## 2023-11-14 NOTE — PROGRESS NOTE ADULT - SUBJECTIVE AND OBJECTIVE BOX
Patient is a 73y old  Female who presents with a chief complaint of Respiratory distress (14 Nov 2023 20:33)      SUBJECTIVE / OVERNIGHT EVENTS: s/p HD 11/13 pressor assisted, 2.2L removed    MEDICATIONS  (STANDING):  albuterol/ipratropium for Nebulization 3 milliLiter(s) Nebulizer every 6 hours  artificial tears (preservative free) Ophthalmic Solution 1 Drop(s) Both EYES every 4 hours  aspirin  chewable 81 milliGRAM(s) Enteral Tube daily  atorvastatin 10 milliGRAM(s) Oral at bedtime  calamine/zinc oxide Lotion 1 Application(s) Topical daily  chlorhexidine 0.12% Liquid 15 milliLiter(s) Oral Mucosa every 12 hours  chlorhexidine 2% Cloths 1 Application(s) Topical daily  dextrose 5%. 1000 milliLiter(s) (50 mL/Hr) IV Continuous <Continuous>  dextrose 5%. 1000 milliLiter(s) (100 mL/Hr) IV Continuous <Continuous>  dextrose 50% Injectable 12.5 Gram(s) IV Push once  dextrose 50% Injectable 25 Gram(s) IV Push once  dextrose 50% Injectable 25 Gram(s) IV Push once  dextrose 50% Injectable 25 Gram(s) IV Push once  droxidopa 600 milliGRAM(s) Oral every 8 hours  epoetin odlais (EPOGEN) Injectable 55650 Unit(s) IV Push <User Schedule>  ferrous    sulfate Liquid 300 milliGRAM(s) Oral daily  glucagon  Injectable 1 milliGRAM(s) IntraMuscular once  insulin lispro (ADMELOG) corrective regimen sliding scale   SubCutaneous every 6 hours  insulin NPH human recombinant 6 Unit(s) SubCutaneous every 6 hours  midodrine. 40 milliGRAM(s) Oral <User Schedule>  norepinephrine Infusion 0.05 MICROgram(s)/kG/Min (6.23 mL/Hr) IV Continuous <Continuous>  pantoprazole  Injectable 40 milliGRAM(s) IV Push two times a day  petrolatum Ophthalmic Ointment 1 Application(s) Both EYES four times a day  sodium chloride 1 Gram(s) Oral two times a day  sodium chloride 3%  Inhalation 4 milliLiter(s) Inhalation every 6 hours    MEDICATIONS  (PRN):  acetaminophen   Oral Liquid .. 650 milliGRAM(s) Oral every 6 hours PRN Temp greater or equal to 38C (100.4F), Mild Pain (1 - 3)  dextrose Oral Gel 15 Gram(s) Oral once PRN Blood Glucose LESS THAN 70 milliGRAM(s)/deciliter  diphenhydrAMINE Elixir 50 milliGRAM(s) Oral once PRN Rash and/or Itching  droxidopa 600 milliGRAM(s) Oral <User Schedule> PRN prior to hemodialysis  midodrine. 40 milliGRAM(s) Oral once PRN 1 Hour Pre HD  sodium chloride 0.9% Bolus. 250 milliLiter(s) IV Bolus every 5 minutes PRN SBP LESS THAN or EQUAL to 90 mmHg  sodium chloride 0.9% lock flush 10 milliLiter(s) IV Push every 1 hour PRN Pre/post blood products, medications, blood draw, and to maintain line patency      Vital Signs Last 24 Hrs  T(F): 99 (11-14-23 @ 20:00), Max: 99 (11-14-23 @ 20:00)  HR: 117 (11-14-23 @ 23:05) (102 - 126)  BP: --  RR: 24 (11-14-23 @ 22:00) (10 - 25)  SpO2: 100% (11-14-23 @ 23:05) (97% - 100%)  Telemetry:   CAPILLARY BLOOD GLUCOSE      POCT Blood Glucose.: 129 mg/dL (14 Nov 2023 23:24)  POCT Blood Glucose.: 110 mg/dL (14 Nov 2023 17:32)  POCT Blood Glucose.: 98 mg/dL (14 Nov 2023 11:37)  POCT Blood Glucose.: 98 mg/dL (14 Nov 2023 06:40)    I&O's Summary    13 Nov 2023 07:01  -  14 Nov 2023 07:00  --------------------------------------------------------  IN: 1111.2 mL / OUT: 2900 mL / NET: -1788.8 mL    14 Nov 2023 07:01  -  14 Nov 2023 23:42  --------------------------------------------------------  IN: 150 mL / OUT: 100 mL / NET: 50 mL        PHYSICAL EXAM:  GENERAL: NAD, well-developed  HEAD:  Atraumatic, Normocephalic  EYES: EOMI, PERRLA, conjunctiva and sclera clear  NECK: Supple, No JVD  CHEST/LUNG: Clear to auscultation bilaterally; No wheeze  HEART: Regular rate and rhythm; No murmurs, rubs, or gallops  ABDOMEN: Soft, Nontender, Nondistended; Bowel sounds present  EXTREMITIES:  2+ Peripheral Pulses, No clubbing, cyanosis, or edema  PSYCH: AAOx3  NEUROLOGY: non-focal  SKIN: No rashes or lesions    LABS:                        9.1    16.68 )-----------( 464      ( 14 Nov 2023 02:53 )             31.2     11-14    136  |  100  |  31<H>  ----------------------------<  117<H>  5.0   |  28  |  1.33<H>    Ca    8.6      14 Nov 2023 02:53  Phos  4.5     11-14  Mg     2.40     11-14    TPro  6.5  /  Alb  2.0<L>  /  TBili  0.2  /  DBili  x   /  AST  45<H>  /  ALT  35<H>  /  AlkPhos  436<H>  11-14    PT/INR - ( 14 Nov 2023 02:53 )   PT: 12.0 sec;   INR: 1.06 ratio         PTT - ( 14 Nov 2023 02:53 )  PTT:31.6 sec      Urinalysis Basic - ( 14 Nov 2023 02:53 )    Color: x / Appearance: x / SG: x / pH: x  Gluc: 117 mg/dL / Ketone: x  / Bili: x / Urobili: x   Blood: x / Protein: x / Nitrite: x   Leuk Esterase: x / RBC: x / WBC x   Sq Epi: x / Non Sq Epi: x / Bacteria: x        RADIOLOGY & ADDITIONAL TESTS:    Imaging Personally Reviewed:    Consultant(s) Notes Reviewed:      Care Discussed with Consultants/Other Providers:

## 2023-11-14 NOTE — PROGRESS NOTE ADULT - SUBJECTIVE AND OBJECTIVE BOX
S:  Pt seen and examined. More eye opening today      Medications: MEDICATIONS  (STANDING):  albuterol/ipratropium for Nebulization 3 milliLiter(s) Nebulizer every 6 hours  artificial tears (preservative free) Ophthalmic Solution 1 Drop(s) Both EYES every 4 hours  aspirin  chewable 81 milliGRAM(s) Enteral Tube daily  atorvastatin 10 milliGRAM(s) Oral at bedtime  calamine/zinc oxide Lotion 1 Application(s) Topical daily  chlorhexidine 0.12% Liquid 15 milliLiter(s) Oral Mucosa every 12 hours  chlorhexidine 2% Cloths 1 Application(s) Topical daily  dextrose 5%. 1000 milliLiter(s) (50 mL/Hr) IV Continuous <Continuous>  dextrose 5%. 1000 milliLiter(s) (100 mL/Hr) IV Continuous <Continuous>  dextrose 50% Injectable 12.5 Gram(s) IV Push once  dextrose 50% Injectable 25 Gram(s) IV Push once  dextrose 50% Injectable 25 Gram(s) IV Push once  dextrose 50% Injectable 25 Gram(s) IV Push once  droxidopa 600 milliGRAM(s) Oral every 8 hours  epoetin odalis (EPOGEN) Injectable 25824 Unit(s) IV Push <User Schedule>  ferrous    sulfate Liquid 300 milliGRAM(s) Oral daily  glucagon  Injectable 1 milliGRAM(s) IntraMuscular once  insulin lispro (ADMELOG) corrective regimen sliding scale   SubCutaneous every 6 hours  insulin NPH human recombinant 6 Unit(s) SubCutaneous every 6 hours  midodrine. 40 milliGRAM(s) Oral <User Schedule>  norepinephrine Infusion 0.05 MICROgram(s)/kG/Min (6.23 mL/Hr) IV Continuous <Continuous>  pantoprazole  Injectable 40 milliGRAM(s) IV Push two times a day  petrolatum Ophthalmic Ointment 1 Application(s) Both EYES four times a day  sodium chloride 1 Gram(s) Oral two times a day  sodium chloride 3%  Inhalation 4 milliLiter(s) Inhalation every 6 hours    MEDICATIONS  (PRN):  acetaminophen   Oral Liquid .. 650 milliGRAM(s) Oral every 6 hours PRN Temp greater or equal to 38C (100.4F), Mild Pain (1 - 3)  dextrose Oral Gel 15 Gram(s) Oral once PRN Blood Glucose LESS THAN 70 milliGRAM(s)/deciliter  diphenhydrAMINE Elixir 50 milliGRAM(s) Oral once PRN Rash and/or Itching  droxidopa 600 milliGRAM(s) Oral <User Schedule> PRN prior to hemodialysis  midodrine. 40 milliGRAM(s) Oral once PRN 1 Hour Pre HD  sodium chloride 0.9% Bolus. 250 milliLiter(s) IV Bolus every 5 minutes PRN SBP LESS THAN or EQUAL to 90 mmHg  sodium chloride 0.9% lock flush 10 milliLiter(s) IV Push every 1 hour PRN Pre/post blood products, medications, blood draw, and to maintain line patency       Vitals:  Vital Signs Last 24 Hrs  T(C): 37 (14 Nov 2023 16:00), Max: 37 (14 Nov 2023 16:00)  T(F): 98.6 (14 Nov 2023 16:00), Max: 98.6 (14 Nov 2023 16:00)  HR: 125 (14 Nov 2023 19:33) (89 - 126)  BP: --  BP(mean): --  RR: 24 (14 Nov 2023 18:00) (10 - 25)  SpO2: 99% (14 Nov 2023 19:33) (96% - 100%)    Parameters below as of 14 Nov 2023 18:00      O2 Concentration (%): 40    LABS:                          9.1    16.68 )-----------( 464      ( 14 Nov 2023 02:53 )             31.2     11-14    136  |  100  |  31<H>  ----------------------------<  117<H>  5.0   |  28  |  1.33<H>    Ca    8.6      14 Nov 2023 02:53  Phos  4.5     11-14  Mg     2.40     11-14    TPro  6.5  /  Alb  2.0<L>  /  TBili  0.2  /  DBili  x   /  AST  45<H>  /  ALT  35<H>  /  AlkPhos  436<H>  11-14    LIVER FUNCTIONS - ( 14 Nov 2023 02:53 )  Alb: 2.0 g/dL / Pro: 6.5 g/dL / ALK PHOS: 436 U/L / ALT: 35 U/L / AST: 45 U/L / GGT: x           PT/INR - ( 14 Nov 2023 02:53 )   PT: 12.0 sec;   INR: 1.06 ratio         PTT - ( 14 Nov 2023 02:53 )  PTT:31.6 sec  Urinalysis Basic - ( 14 Nov 2023 02:53 )    Color: x / Appearance: x / SG: x / pH: x  Gluc: 117 mg/dL / Ketone: x  / Bili: x / Urobili: x   Blood: x / Protein: x / Nitrite: x   Leuk Esterase: x / RBC: x / WBC x   Sq Epi: x / Non Sq Epi: x / Bacteria: x        Neurological Exam:  Mental Status: Eyes closed, minimal spont eye opening off sedation. Does not follow commands,  + trach to vent and NGT   Cranial Nerves: PERRL slightly sluggish, R facial twitching noted by mouth lessened today- ? suppressible. occasional head dystonia  Motor: Moves upper extremities spontaneously R >L, tremor R > L  no movement noted in lowers  Sensation: WD to noxious x4    I personally reviewed the below data/images/labs:        < from: CT Head No Cont (08.15.23 @ 17:20) >    ACC: [id number] EXAM:  CT BRAIN   ORDERED BY: MINAL SAM     PROCEDURE DATE:  08/15/2023          INTERPRETATION:  Clinical indication: Change in neuro exam.    Multiple axial sections were performed from base to vertex without   contrast enhancement. Coronal and sagittal reconstructions were   reformatted well.    This exam is compared prior head CT performed on August 11, 2023    Parenchymal volume loss and chronic microvessel ischemic changes are   again seen.    Abnormal low-attenuation involving the left occipital cortical   subcortical region is again seen. 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    No evidence of acute hemorrhage mass or mass effect is seen.    Evaluation of the osseous structures with appropriate window demonstrates   sclerotic changes about the left mastoid region which appears stable.   Opacification left middle ear region is again seen.    Patient is status post bilateral cataract surgery.    Impression: Stable exam.    < end of copied text >    vEEG:    Clinical Impression:  - Severe diffuse non-specific cerebral dysfunction  - There were no epileptiform abnormalities or seizures recorded.        CTH 8/11:    VENTRICLES AND SULCI: Age-appropriate involutional change  INTRA-AXIAL:  Old left PCA infarct as seen on the prior unchanged.   Microvascular ischemic changes involving the periventricular and   subcortical white matter as seen previously  EXTRA-AXIAL:  No mass or collection is seen.  VISUALIZED SINUSES:  Clear.  VISUALIZED MASTOIDS: Left mastoid sclerosis  CALVARIUM: Infiltrative appearance tothe calvarium may be indicative of   marrow infiltration on the basis of patient's known diagnosis of breast   cancer. MISCELLANEOUS:  None.    IMPRESSION:  No significant interval change compared with 7/17/2023 in   left PCA infarct which is old. Microvascular ischemic changes involving   the periventricular and subcortical white matter as seen   previously.Questionable lesions at the level of the calvarium related to   possible breast CA. Clinical correlation recommended.    --- End of Report ---      ct< from: CT Head No Cont (09.26.23 @ 21:02) >  IMPRESSION: Vague questionable areas of hypoattenuation within the   bilateral cerebellar hemispheres which may be compatible with   acute/subacute ischemia. Recommend further evaluation with a brain MRI   study, provided there are no MRI contraindications.    No acute intracranial hemorrhage.    Previously seen questionable vague wedge-shaped area of hypoattenuation   in the left parietal lobe with artifactual secondary to motion and volume   averaging with a prominent sulcus.    Chronic left occipital lobe infarct.    < end of copied text >    < from: CT Head No Cont (10.03.23 @ 20:46) >    IMPRESSION:    No acute intracranial hemorrhage or mass effect. Evaluation of the   posterior fossa degraded by streak artifact from dental amalgam.   Lucencies in the bilateral cerebellum may be artifactual.    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    Bilateral middle ear and mastoid effusions which are also seen on prior   exam.    EEG Classification / Summary:  Abnormal EEG in the awake, drowsy states.   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   -No seizures

## 2023-11-14 NOTE — PROGRESS NOTE ADULT - SUBJECTIVE AND OBJECTIVE BOX
Significant recent/past 24 hr events:    Subjective:    Review of Systems         [ ] A ten-point review of systems was otherwise negative except as noted.  [ ] Due to altered mental status/intubation, subjective information were not able to be obtained from the patient. History was obtained, to the extent possible, from review of the chart and collateral sources of information.      Patient is a 73y old  Female who presents with a chief complaint of Respiratory distress (13 Nov 2023 19:58)    HPI:  72 y/o F DM on insulin, HTN, CKD,breast CA, respiratory arrest and cardiac arrest (2018), CVA with residual weakness, aspiration PNA h/o trache, PEG, both removed presents with respiratory distress requiring intubation. Had recent admission d/c 7/22/23 for cough and respiratory distress (no intubation) thought to be i/s/o aspiration PNA s/p cefepime course; developed rash in response. Infectious w/u was negative at this time. Was discharged home, daughter and  at bedside providing history.     Reports that she was initially improving with O2 sats in the high 90s on room air, however, then became more lethargic and not herself (baseline mental status AOx3). Also had worsening shortness of breath while laying down and leg swelling. Contacted cardiologist who started her on bumex 1mg daily. Developed low sugars overnight before admission and was given juice with some food, daughter reports no coughing during episode. At around 4-5 AM developed vomiting and coughing, O2 sats dropped to 80s and EMS was called. Denies fevers/chills, dysuria or urinary frequency, chest pain, palpitations. Uses wheelchair at home however family moves her around frequently.     In ED, was on BiPAP with increased WOB and was intubated. Found to have elevated pro-BNP and CO2 of 111 on VBG. CXR with small right pleural effusion, started on vanc in the ED.  (11 Aug 2023 09:08)    PAST MEDICAL & SURGICAL HISTORY:  Breast CA      Diabetes      Stroke      Cardiac arrest      HTN (hypertension)      H/O mastectomy, bilateral        FAMILY HISTORY:  No pertinent family history in first degree relatives        Vitals   ICU Vital Signs Last 24 Hrs  T(C): 36.9 (14 Nov 2023 04:00), Max: 37.2 (13 Nov 2023 20:00)  T(F): 98.5 (14 Nov 2023 04:00), Max: 99 (13 Nov 2023 20:00)  HR: 106 (14 Nov 2023 07:00) (89 - 116)  BP: --  BP(mean): --  ABP: 113/42 (14 Nov 2023 07:00) (104/39 - 134/49)  ABP(mean): 71 (14 Nov 2023 07:00) (64 - 86)  RR: 24 (14 Nov 2023 07:00) (16 - 26)  SpO2: 97% (14 Nov 2023 07:00) (92% - 100%)    O2 Parameters below as of 14 Nov 2023 07:00  Patient On (Oxygen Delivery Method): ventilator    O2 Concentration (%): 40        Physical Exam:   Constitutional: NAD, well-groomed, well-developed  HEENT: PERRLA, EOMI, no drainage or redness  Neck: supple,  No JVD, Trachea midline  Back: Normal spine flexure, No CVA tenderness, No deformity or limitation of movement  Respiratory: Breath Sounds equal & clear bilaterally to auscultation, no accessory muscle use noted  Cardiovascular: Regular rate, regular rhythm, normal S1, S2; no murmurs or rub  Gastrointestinal: Soft, non-tender, non distended, no hepatosplenomegaly, normal bowel sounds  Extremities: CENTENO x 4, no peripheral edema, no cyanosis, no clubbing   Vascular: Equal and normal pulses: 2+ peripheral pulses throughout  Neurological: A+O x 3; speech clear and intact; no sensory, motor  deficits, normal reflexes  Psychiatric: calm, normal mood, normal affect  Musculoskeletal: No joint swelling or deformity; no limitation of movement  Skin: warm, dry, well perfused, no rashes    VENT SETTINGS   Mode: AC/ CMV (Assist Control/ Continuous Mandatory Ventilation)  RR (machine): 24  FiO2: 40  PEEP: 8  ITime: 0.8  MAP: 20  PC: 35  PIP: 43        I&O's Detail    13 Nov 2023 07:01  -  14 Nov 2023 07:00  --------------------------------------------------------  IN:    Enteral Tube Flush: 150 mL    Glucerna 1.5: 460 mL    Nepro with Carb Steady: 90 mL    Norepinephrine: 11.2 mL    Other (mL): 400 mL  Total IN: 1111.2 mL    OUT:    Other (mL): 2600 mL    Stool (mL): 300 mL  Total OUT: 2900 mL    Total NET: -1788.8 mL          LABS                        9.1    16.68 )-----------( 464      ( 14 Nov 2023 02:53 )             31.2     11-14    136  |  100  |  31<H>  ----------------------------<  117<H>  5.0   |  28  |  1.33<H>    Ca    8.6      14 Nov 2023 02:53  Phos  4.5     11-14  Mg     2.40     11-14    TPro  6.5  /  Alb  2.0<L>  /  TBili  0.2  /  DBili  x   /  AST  45<H>  /  ALT  35<H>  /  AlkPhos  436<H>  11-14    LIVER FUNCTIONS - ( 14 Nov 2023 02:53 )  Alb: 2.0 g/dL / Pro: 6.5 g/dL / ALK PHOS: 436 U/L / ALT: 35 U/L / AST: 45 U/L / GGT: x           PT/INR - ( 14 Nov 2023 02:53 )   PT: 12.0 sec;   INR: 1.06 ratio         PTT - ( 14 Nov 2023 02:53 )  PTT:31.6 sec        Urinalysis Basic - ( 14 Nov 2023 02:53 )    Color: x / Appearance: x / SG: x / pH: x  Gluc: 117 mg/dL / Ketone: x  / Bili: x / Urobili: x   Blood: x / Protein: x / Nitrite: x   Leuk Esterase: x / RBC: x / WBC x   Sq Epi: x / Non Sq Epi: x / Bacteria: x      POCT Blood Glucose.: 98 mg/dL (11-14-23 @ 06:40)  POCT Blood Glucose.: 152 mg/dL *H* (11-13-23 @ 23:12)  POCT Blood Glucose.: 172 mg/dL *H* (11-13-23 @ 17:41)  POCT Blood Glucose.: 166 mg/dL *H* (11-13-23 @ 11:21)        MEDICATIONS  (STANDING):  albuterol/ipratropium for Nebulization 3 milliLiter(s) Nebulizer every 6 hours  artificial tears (preservative free) Ophthalmic Solution 1 Drop(s) Both EYES every 4 hours  atorvastatin 10 milliGRAM(s) Oral at bedtime  calamine/zinc oxide Lotion 1 Application(s) Topical daily  chlorhexidine 0.12% Liquid 15 milliLiter(s) Oral Mucosa every 12 hours  chlorhexidine 2% Cloths 1 Application(s) Topical daily  dextrose 5%. 1000 milliLiter(s) (100 mL/Hr) IV Continuous <Continuous>  dextrose 5%. 1000 milliLiter(s) (50 mL/Hr) IV Continuous <Continuous>  dextrose 50% Injectable 12.5 Gram(s) IV Push once  dextrose 50% Injectable 25 Gram(s) IV Push once  dextrose 50% Injectable 25 Gram(s) IV Push once  dextrose 50% Injectable 25 Gram(s) IV Push once  droxidopa 600 milliGRAM(s) Oral every 8 hours  epoetin odalis (EPOGEN) Injectable 83366 Unit(s) IV Push <User Schedule>  ferrous    sulfate Liquid 300 milliGRAM(s) Oral daily  glucagon  Injectable 1 milliGRAM(s) IntraMuscular once  insulin lispro (ADMELOG) corrective regimen sliding scale   SubCutaneous every 6 hours  insulin NPH human recombinant 6 Unit(s) SubCutaneous every 6 hours  midodrine. 40 milliGRAM(s) Oral <User Schedule>  norepinephrine Infusion 0.05 MICROgram(s)/kG/Min (6.23 mL/Hr) IV Continuous <Continuous>  pantoprazole  Injectable 40 milliGRAM(s) IV Push two times a day  petrolatum Ophthalmic Ointment 1 Application(s) Both EYES four times a day  sodium chloride 1 Gram(s) Oral two times a day  sodium chloride 3%  Inhalation 4 milliLiter(s) Inhalation every 6 hours    MEDICATIONS  (PRN):  acetaminophen   Oral Liquid .. 650 milliGRAM(s) Oral every 6 hours PRN Temp greater or equal to 38C (100.4F), Mild Pain (1 - 3)  dextrose Oral Gel 15 Gram(s) Oral once PRN Blood Glucose LESS THAN 70 milliGRAM(s)/deciliter  diphenhydrAMINE Elixir 50 milliGRAM(s) Oral once PRN Rash and/or Itching  droxidopa 600 milliGRAM(s) Oral <User Schedule> PRN prior to hemodialysis  midodrine. 40 milliGRAM(s) Oral once PRN 1 Hour Pre HD  sodium chloride 0.9% Bolus. 250 milliLiter(s) IV Bolus every 5 minutes PRN SBP LESS THAN or EQUAL to 90 mmHg  sodium chloride 0.9% lock flush 10 milliLiter(s) IV Push every 1 hour PRN Pre/post blood products, medications, blood draw, and to maintain line patency      Allergies:  isoniazid (Rash)  nafcillin (Unknown)  hydrALAZINE (Rash)  vitamin E (Short breath; Urticaria; Hives)  doxycycline (Rash)  cefepime (Rash)  NIFEdipine (Urticaria; Hives)  Zosyn (Rash)        CRITICAL CARE TIME SPENT:  minutes of critical care time spent providing medical care for patient's acute illness/conditions that impairs at least one vital organ system and/or poses a high risk of imminent or life threatening deterioration in the patient's condition. It includes time spent evaluating and treating the patient's acute illness as well as time spent reviewing labs, radiology, discussing goals of care with patient and/or patient's family, and discussing the case with a multidisciplinary team, in an effort to prevent further life threatening deterioration or end organ damage. This time is independent of any procedures performed.         Significant recent/past 24 hr events: no significant events     Subjective: pt is unable to provide any history     Review of Systems         [ ] A ten-point review of systems was otherwise negative except as noted.  [ ] Due to altered mental status/intubation, subjective information were not able to be obtained from the patient. History was obtained, to the extent possible, from review of the chart and collateral sources of information.      Patient is a 73y old  Female who presents with a chief complaint of Respiratory distress (13 Nov 2023 19:58)    HPI:  74 y/o F DM on insulin, HTN, CKD,breast CA, respiratory arrest and cardiac arrest (2018), CVA with residual weakness, aspiration PNA h/o trache, PEG, both removed presents with respiratory distress requiring intubation. Had recent admission d/c 7/22/23 for cough and respiratory distress (no intubation) thought to be i/s/o aspiration PNA s/p cefepime course; developed rash in response. Infectious w/u was negative at this time. Was discharged home, daughter and  at bedside providing history.     Reports that she was initially improving with O2 sats in the high 90s on room air, however, then became more lethargic and not herself (baseline mental status AOx3). Also had worsening shortness of breath while laying down and leg swelling. Contacted cardiologist who started her on bumex 1mg daily. Developed low sugars overnight before admission and was given juice with some food, daughter reports no coughing during episode. At around 4-5 AM developed vomiting and coughing, O2 sats dropped to 80s and EMS was called. Denies fevers/chills, dysuria or urinary frequency, chest pain, palpitations. Uses wheelchair at home however family moves her around frequently.     In ED, was on BiPAP with increased WOB and was intubated. Found to have elevated pro-BNP and CO2 of 111 on VBG. CXR with small right pleural effusion, started on vanc in the ED.  (11 Aug 2023 09:08)    PAST MEDICAL & SURGICAL HISTORY:  Breast CA      Diabetes      Stroke      Cardiac arrest      HTN (hypertension)      H/O mastectomy, bilateral        FAMILY HISTORY:  No pertinent family history in first degree relatives        Vitals   ICU Vital Signs Last 24 Hrs  T(C): 36.9 (14 Nov 2023 04:00), Max: 37.2 (13 Nov 2023 20:00)  T(F): 98.5 (14 Nov 2023 04:00), Max: 99 (13 Nov 2023 20:00)  HR: 106 (14 Nov 2023 07:00) (89 - 116)  BP: --  BP(mean): --  ABP: 113/42 (14 Nov 2023 07:00) (104/39 - 134/49)  ABP(mean): 71 (14 Nov 2023 07:00) (64 - 86)  RR: 24 (14 Nov 2023 07:00) (16 - 26)  SpO2: 97% (14 Nov 2023 07:00) (92% - 100%)    O2 Parameters below as of 14 Nov 2023 07:00  Patient On (Oxygen Delivery Method): ventilator    O2 Concentration (%): 40        Physical Exam:   Constitutional: NAD, well-groomed, well-developed  HEENT: PERRLA, EOMI, no drainage or redness  Neck: supple,  No JVD, Trachea midline  Back: Normal spine flexure, No CVA tenderness, No deformity or limitation of movement  Respiratory: Breath Sound diminished bilaterally to auscultation, no accessory muscle use noted  Cardiovascular: Regular rate, regular rhythm, normal S1, S2; no murmurs or rub  Gastrointestinal: Soft, non-tender, non distended, no hepatosplenomegaly, normal bowel sounds  Extremities: CENTENO x 4, no peripheral edema, no cyanosis, no clubbing   Vascular: Equal and normal pulses: 2+ peripheral pulses throughout  Neurological: A+O x 3; speech clear and intact; no sensory, motor  deficits, normal reflexes  Psychiatric: calm, normal mood, normal affect  Musculoskeletal: No joint swelling or deformity; no limitation of movement  Skin: warm, dry, well perfused, no rashes    VENT SETTINGS   Mode: AC/ CMV (Assist Control/ Continuous Mandatory Ventilation)  RR (machine): 24  FiO2: 40  PEEP: 8  ITime: 0.8  MAP: 20  PC: 35  PIP: 43        I&O's Detail    13 Nov 2023 07:01  -  14 Nov 2023 07:00  --------------------------------------------------------  IN:    Enteral Tube Flush: 150 mL    Glucerna 1.5: 460 mL    Nepro with Carb Steady: 90 mL    Norepinephrine: 11.2 mL    Other (mL): 400 mL  Total IN: 1111.2 mL    OUT:    Other (mL): 2600 mL    Stool (mL): 300 mL  Total OUT: 2900 mL    Total NET: -1788.8 mL          LABS                        9.1    16.68 )-----------( 464      ( 14 Nov 2023 02:53 )             31.2     11-14    136  |  100  |  31<H>  ----------------------------<  117<H>  5.0   |  28  |  1.33<H>    Ca    8.6      14 Nov 2023 02:53  Phos  4.5     11-14  Mg     2.40     11-14    TPro  6.5  /  Alb  2.0<L>  /  TBili  0.2  /  DBili  x   /  AST  45<H>  /  ALT  35<H>  /  AlkPhos  436<H>  11-14    LIVER FUNCTIONS - ( 14 Nov 2023 02:53 )  Alb: 2.0 g/dL / Pro: 6.5 g/dL / ALK PHOS: 436 U/L / ALT: 35 U/L / AST: 45 U/L / GGT: x           PT/INR - ( 14 Nov 2023 02:53 )   PT: 12.0 sec;   INR: 1.06 ratio         PTT - ( 14 Nov 2023 02:53 )  PTT:31.6 sec        Urinalysis Basic - ( 14 Nov 2023 02:53 )    Color: x / Appearance: x / SG: x / pH: x  Gluc: 117 mg/dL / Ketone: x  / Bili: x / Urobili: x   Blood: x / Protein: x / Nitrite: x   Leuk Esterase: x / RBC: x / WBC x   Sq Epi: x / Non Sq Epi: x / Bacteria: x      POCT Blood Glucose.: 98 mg/dL (11-14-23 @ 06:40)  POCT Blood Glucose.: 152 mg/dL *H* (11-13-23 @ 23:12)  POCT Blood Glucose.: 172 mg/dL *H* (11-13-23 @ 17:41)  POCT Blood Glucose.: 166 mg/dL *H* (11-13-23 @ 11:21)        MEDICATIONS  (STANDING):  albuterol/ipratropium for Nebulization 3 milliLiter(s) Nebulizer every 6 hours  artificial tears (preservative free) Ophthalmic Solution 1 Drop(s) Both EYES every 4 hours  atorvastatin 10 milliGRAM(s) Oral at bedtime  calamine/zinc oxide Lotion 1 Application(s) Topical daily  chlorhexidine 0.12% Liquid 15 milliLiter(s) Oral Mucosa every 12 hours  chlorhexidine 2% Cloths 1 Application(s) Topical daily  dextrose 5%. 1000 milliLiter(s) (100 mL/Hr) IV Continuous <Continuous>  dextrose 5%. 1000 milliLiter(s) (50 mL/Hr) IV Continuous <Continuous>  dextrose 50% Injectable 12.5 Gram(s) IV Push once  dextrose 50% Injectable 25 Gram(s) IV Push once  dextrose 50% Injectable 25 Gram(s) IV Push once  dextrose 50% Injectable 25 Gram(s) IV Push once  droxidopa 600 milliGRAM(s) Oral every 8 hours  epoetin odalis (EPOGEN) Injectable 36079 Unit(s) IV Push <User Schedule>  ferrous    sulfate Liquid 300 milliGRAM(s) Oral daily  glucagon  Injectable 1 milliGRAM(s) IntraMuscular once  insulin lispro (ADMELOG) corrective regimen sliding scale   SubCutaneous every 6 hours  insulin NPH human recombinant 6 Unit(s) SubCutaneous every 6 hours  midodrine. 40 milliGRAM(s) Oral <User Schedule>  norepinephrine Infusion 0.05 MICROgram(s)/kG/Min (6.23 mL/Hr) IV Continuous <Continuous>  pantoprazole  Injectable 40 milliGRAM(s) IV Push two times a day  petrolatum Ophthalmic Ointment 1 Application(s) Both EYES four times a day  sodium chloride 1 Gram(s) Oral two times a day  sodium chloride 3%  Inhalation 4 milliLiter(s) Inhalation every 6 hours    MEDICATIONS  (PRN):  acetaminophen   Oral Liquid .. 650 milliGRAM(s) Oral every 6 hours PRN Temp greater or equal to 38C (100.4F), Mild Pain (1 - 3)  dextrose Oral Gel 15 Gram(s) Oral once PRN Blood Glucose LESS THAN 70 milliGRAM(s)/deciliter  diphenhydrAMINE Elixir 50 milliGRAM(s) Oral once PRN Rash and/or Itching  droxidopa 600 milliGRAM(s) Oral <User Schedule> PRN prior to hemodialysis  midodrine. 40 milliGRAM(s) Oral once PRN 1 Hour Pre HD  sodium chloride 0.9% Bolus. 250 milliLiter(s) IV Bolus every 5 minutes PRN SBP LESS THAN or EQUAL to 90 mmHg  sodium chloride 0.9% lock flush 10 milliLiter(s) IV Push every 1 hour PRN Pre/post blood products, medications, blood draw, and to maintain line patency      Allergies:  isoniazid (Rash)  nafcillin (Unknown)  hydrALAZINE (Rash)  vitamin E (Short breath; Urticaria; Hives)  doxycycline (Rash)  cefepime (Rash)  NIFEdipine (Urticaria; Hives)  Zosyn (Rash)        CRITICAL CARE TIME SPENT:  minutes of critical care time spent providing medical care for patient's acute illness/conditions that impairs at least one vital organ system and/or poses a high risk of imminent or life threatening deterioration in the patient's condition. It includes time spent evaluating and treating the patient's acute illness as well as time spent reviewing labs, radiology, discussing goals of care with patient and/or patient's family, and discussing the case with a multidisciplinary team, in an effort to prevent further life threatening deterioration or end organ damage. This time is independent of any procedures performed.         Significant recent/past 24 hr events: no significant events     Subjective: pt is unable to provide any history     Review of Systems         [ ] A ten-point review of systems was otherwise negative except as noted.  [ ] Due to altered mental status/intubation, subjective information were not able to be obtained from the patient. History was obtained, to the extent possible, from review of the chart and collateral sources of information.      Patient is a 73y old  Female who presents with a chief complaint of Respiratory distress (13 Nov 2023 19:58)    HPI:  72 y/o F DM on insulin, HTN, CKD,breast CA, respiratory arrest and cardiac arrest (2018), CVA with residual weakness, aspiration PNA h/o trache, PEG, both removed presents with respiratory distress requiring intubation. Had recent admission d/c 7/22/23 for cough and respiratory distress (no intubation) thought to be i/s/o aspiration PNA s/p cefepime course; developed rash in response. Infectious w/u was negative at this time. Was discharged home, daughter and  at bedside providing history.     Reports that she was initially improving with O2 sats in the high 90s on room air, however, then became more lethargic and not herself (baseline mental status AOx3). Also had worsening shortness of breath while laying down and leg swelling. Contacted cardiologist who started her on bumex 1mg daily. Developed low sugars overnight before admission and was given juice with some food, daughter reports no coughing during episode. At around 4-5 AM developed vomiting and coughing, O2 sats dropped to 80s and EMS was called. Denies fevers/chills, dysuria or urinary frequency, chest pain, palpitations. Uses wheelchair at home however family moves her around frequently.     In ED, was on BiPAP with increased WOB and was intubated. Found to have elevated pro-BNP and CO2 of 111 on VBG. CXR with small right pleural effusion, started on vanc in the ED.  (11 Aug 2023 09:08)    PAST MEDICAL & SURGICAL HISTORY:  Breast CA      Diabetes      Stroke      Cardiac arrest      HTN (hypertension)      H/O mastectomy, bilateral        FAMILY HISTORY:  No pertinent family history in first degree relatives        Vitals   ICU Vital Signs Last 24 Hrs  T(C): 36.9 (14 Nov 2023 04:00), Max: 37.2 (13 Nov 2023 20:00)  T(F): 98.5 (14 Nov 2023 04:00), Max: 99 (13 Nov 2023 20:00)  HR: 106 (14 Nov 2023 07:00) (89 - 116)  BP: --  BP(mean): --  ABP: 113/42 (14 Nov 2023 07:00) (104/39 - 134/49)  ABP(mean): 71 (14 Nov 2023 07:00) (64 - 86)  RR: 24 (14 Nov 2023 07:00) (16 - 26)  SpO2: 97% (14 Nov 2023 07:00) (92% - 100%)    O2 Parameters below as of 14 Nov 2023 07:00  Patient On (Oxygen Delivery Method): ventilator    O2 Concentration (%): 40        Physical Exam:   Constitutional: NAD, well-groomed, well-developed  HEENT: PERRLA, EOMI, no drainage or redness  Neck: supple,  No JVD, Trachea midline  Back: Normal spine flexure, No CVA tenderness, No deformity or limitation of movement  Respiratory: Breath Sound diminished bilaterally to auscultation, no accessory muscle use noted  Cardiovascular: Regular rate, regular rhythm, normal S1, S2; no murmurs or rub  Gastrointestinal: Soft, non-tender, non distended, no hepatosplenomegaly, normal bowel sounds  Extremities: CENTENO x 4, 2 + peripheral edema, no cyanosis, no clubbing   Vascular: Equal and normal pulses: 2+ peripheral pulses throughout  Neurological: opens eyes spontaneously, not tracking, not following commands, no sensory, motor  deficits, normal reflexes  Psychiatric: calm, normal mood, normal affect  Musculoskeletal: No joint swelling or deformity; no limitation of movement  Skin: warm, dry, well perfused, no rashes    VENT SETTINGS   Mode: AC/ CMV (Assist Control/ Continuous Mandatory Ventilation)  RR (machine): 24  FiO2: 40  PEEP: 8  ITime: 0.8  MAP: 20  PC: 35  PIP: 43        I&O's Detail    13 Nov 2023 07:01  -  14 Nov 2023 07:00  --------------------------------------------------------  IN:    Enteral Tube Flush: 150 mL    Glucerna 1.5: 460 mL    Nepro with Carb Steady: 90 mL    Norepinephrine: 11.2 mL    Other (mL): 400 mL  Total IN: 1111.2 mL    OUT:    Other (mL): 2600 mL    Stool (mL): 300 mL  Total OUT: 2900 mL    Total NET: -1788.8 mL          LABS                        9.1    16.68 )-----------( 464      ( 14 Nov 2023 02:53 )             31.2     11-14    136  |  100  |  31<H>  ----------------------------<  117<H>  5.0   |  28  |  1.33<H>    Ca    8.6      14 Nov 2023 02:53  Phos  4.5     11-14  Mg     2.40     11-14    TPro  6.5  /  Alb  2.0<L>  /  TBili  0.2  /  DBili  x   /  AST  45<H>  /  ALT  35<H>  /  AlkPhos  436<H>  11-14    LIVER FUNCTIONS - ( 14 Nov 2023 02:53 )  Alb: 2.0 g/dL / Pro: 6.5 g/dL / ALK PHOS: 436 U/L / ALT: 35 U/L / AST: 45 U/L / GGT: x           PT/INR - ( 14 Nov 2023 02:53 )   PT: 12.0 sec;   INR: 1.06 ratio         PTT - ( 14 Nov 2023 02:53 )  PTT:31.6 sec        Urinalysis Basic - ( 14 Nov 2023 02:53 )    Color: x / Appearance: x / SG: x / pH: x  Gluc: 117 mg/dL / Ketone: x  / Bili: x / Urobili: x   Blood: x / Protein: x / Nitrite: x   Leuk Esterase: x / RBC: x / WBC x   Sq Epi: x / Non Sq Epi: x / Bacteria: x      POCT Blood Glucose.: 98 mg/dL (11-14-23 @ 06:40)  POCT Blood Glucose.: 152 mg/dL *H* (11-13-23 @ 23:12)  POCT Blood Glucose.: 172 mg/dL *H* (11-13-23 @ 17:41)  POCT Blood Glucose.: 166 mg/dL *H* (11-13-23 @ 11:21)        MEDICATIONS  (STANDING):  albuterol/ipratropium for Nebulization 3 milliLiter(s) Nebulizer every 6 hours  artificial tears (preservative free) Ophthalmic Solution 1 Drop(s) Both EYES every 4 hours  atorvastatin 10 milliGRAM(s) Oral at bedtime  calamine/zinc oxide Lotion 1 Application(s) Topical daily  chlorhexidine 0.12% Liquid 15 milliLiter(s) Oral Mucosa every 12 hours  chlorhexidine 2% Cloths 1 Application(s) Topical daily  dextrose 5%. 1000 milliLiter(s) (100 mL/Hr) IV Continuous <Continuous>  dextrose 5%. 1000 milliLiter(s) (50 mL/Hr) IV Continuous <Continuous>  dextrose 50% Injectable 12.5 Gram(s) IV Push once  dextrose 50% Injectable 25 Gram(s) IV Push once  dextrose 50% Injectable 25 Gram(s) IV Push once  dextrose 50% Injectable 25 Gram(s) IV Push once  droxidopa 600 milliGRAM(s) Oral every 8 hours  epoetin odalis (EPOGEN) Injectable 97778 Unit(s) IV Push <User Schedule>  ferrous    sulfate Liquid 300 milliGRAM(s) Oral daily  glucagon  Injectable 1 milliGRAM(s) IntraMuscular once  insulin lispro (ADMELOG) corrective regimen sliding scale   SubCutaneous every 6 hours  insulin NPH human recombinant 6 Unit(s) SubCutaneous every 6 hours  midodrine. 40 milliGRAM(s) Oral <User Schedule>  norepinephrine Infusion 0.05 MICROgram(s)/kG/Min (6.23 mL/Hr) IV Continuous <Continuous>  pantoprazole  Injectable 40 milliGRAM(s) IV Push two times a day  petrolatum Ophthalmic Ointment 1 Application(s) Both EYES four times a day  sodium chloride 1 Gram(s) Oral two times a day  sodium chloride 3%  Inhalation 4 milliLiter(s) Inhalation every 6 hours    MEDICATIONS  (PRN):  acetaminophen   Oral Liquid .. 650 milliGRAM(s) Oral every 6 hours PRN Temp greater or equal to 38C (100.4F), Mild Pain (1 - 3)  dextrose Oral Gel 15 Gram(s) Oral once PRN Blood Glucose LESS THAN 70 milliGRAM(s)/deciliter  diphenhydrAMINE Elixir 50 milliGRAM(s) Oral once PRN Rash and/or Itching  droxidopa 600 milliGRAM(s) Oral <User Schedule> PRN prior to hemodialysis  midodrine. 40 milliGRAM(s) Oral once PRN 1 Hour Pre HD  sodium chloride 0.9% Bolus. 250 milliLiter(s) IV Bolus every 5 minutes PRN SBP LESS THAN or EQUAL to 90 mmHg  sodium chloride 0.9% lock flush 10 milliLiter(s) IV Push every 1 hour PRN Pre/post blood products, medications, blood draw, and to maintain line patency      Allergies:  isoniazid (Rash)  nafcillin (Unknown)  hydrALAZINE (Rash)  vitamin E (Short breath; Urticaria; Hives)  doxycycline (Rash)  cefepime (Rash)  NIFEdipine (Urticaria; Hives)  Zosyn (Rash)

## 2023-11-14 NOTE — PROGRESS NOTE ADULT - SUBJECTIVE AND OBJECTIVE BOX
Overnight events noted      VITAL:  T(C): , Max: 37.2 (11-13-23 @ 20:00)  T(F): , Max: 99 (11-13-23 @ 20:00)  HR: 106 (11-14-23 @ 07:00)  BP: --  BP(mean): --  RR: 24 (11-14-23 @ 07:00)  SpO2: 97% (11-14-23 @ 07:00)  Wt(kg): --      PHYSICAL EXAM:  Constitutional: critically ill, trach to vent   HEENT: + NGT, + tracheostomy   Respiratory: coarse BS  Cardiovascular: tachy   Gastrointestinal: BS+, soft, NT/ND  Extremities: ++ generalized edema  Neurological: UTO  : No Wheeler  Skin: No rashes  Access: TRISHA odom (accessed)    LABS:                        9.1    16.68 )-----------( 464      ( 14 Nov 2023 02:53 )             31.2     Na(136)/K(5.0)/Cl(100)/HCO3(28)/BUN(31)/Cr(1.33)Glu(117)/Ca(8.6)/Mg(2.40)/PO4(4.5)    11-14 @ 02:53  Na(134)/K(5.7)/Cl(101)/HCO3(27)/BUN(44)/Cr(1.72)Glu(144)/Ca(8.7)/Mg(2.60)/PO4(6.1)    11-13 @ 05:17  Na(135)/K(5.0)/Cl(101)/HCO3(25)/BUN(37)/Cr(1.54)Glu(164)/Ca(8.6)/Mg(2.40)/PO4(5.1)    11-12 @ 02:30        IMPRESSION: 73F w/ HTN, DM2, CVA, breast CA-bilateral mastectomy, recurrent aspiration pneumonia/respiratory failure, and CKD, 8/11/23 p/w acute hypercapnic respiratory failure; c/b RUSS    (1)Renal - RUSS on CKD4 ==>newly ESRD-HD as of this admission. Last dialyzed yesterday; due for next HD tomorrow    (2)CV- tenuous hemodynamics - intermittently requiring pressor gtt    (3)Pulm- trach/vent-dependent    (4)Anemia- on Epogen with HD    (5)GOC - s/p repeated discussions with family regarding GOC - family persisting with interest in aggressive measures.       RECOMMEND:  (1)Next HD tomorrow - 2.5kg UF as able  (2)PEG placement?  (3)Eventual LTCF with vent/HD  (4)Continued discussions with family regarding GOC      Jimmy Colon MD  [facility]  Office/on call physician: [phone]  Cell (7a-7p): [phone]       S/p HD yesterday with net 2.2L UF  Off pressor gtts at present; on high-dose Midodrine    VITAL:  T(C): , Max: 37.2 (11-13-23 @ 20:00)  T(F): , Max: 99 (11-13-23 @ 20:00)  HR: 106 (11-14-23 @ 07:00)  BP: 113/42  RR: 24 (11-14-23 @ 07:00)  SpO2: 97% (11-14-23 @ 07:00)        PHYSICAL EXAM:  Constitutional: critically ill, trach to vent   HEENT: + NGT, + tracheostomy   Respiratory: coarse BS  Cardiovascular: tachy s1s2  Gastrointestinal: BS+, soft, NT/ND  Extremities: ++ generalized edema  Neurological: UTO  : No Wheeler  Skin: No rashes  Access: TRISHA odom (accessed)    LABS:                        9.1    16.68 )-----------( 464      ( 14 Nov 2023 02:53 )             31.2     Na(136)/K(5.0)/Cl(100)/HCO3(28)/BUN(31)/Cr(1.33)Glu(117)/Ca(8.6)/Mg(2.40)/PO4(4.5)    11-14 @ 02:53  Na(134)/K(5.7)/Cl(101)/HCO3(27)/BUN(44)/Cr(1.72)Glu(144)/Ca(8.7)/Mg(2.60)/PO4(6.1)    11-13 @ 05:17  Na(135)/K(5.0)/Cl(101)/HCO3(25)/BUN(37)/Cr(1.54)Glu(164)/Ca(8.6)/Mg(2.40)/PO4(5.1)    11-12 @ 02:30        IMPRESSION: 73F w/ HTN, DM2, CVA, breast CA-bilateral mastectomy, recurrent aspiration pneumonia/respiratory failure, and CKD, 8/11/23 p/w acute hypercapnic respiratory failure; c/b RUSS    (1)Renal - RUSS on CKD4 ==>newly ESRD-HD as of this admission. Last dialyzed yesterday; due for next HD tomorrow    (2)CV- tenuous hemodynamics - intermittently requiring pressor gtt/on Midodrine 40q6h    (3)Pulm- trach/vent-dependent    (4)Anemia- on Epogen with HD    (5)GOC - s/p repeated discussions with family regarding GOC - family persisting with interest in aggressive measures.       RECOMMEND:  (1)Next HD tomorrow - 2.4kg UF as able  (2)PEG placement?  (3)Eventual LTCF with vent/HD  (4)Continued discussions with family regarding GOC      Jimmy Colon MD  [facility]  Office/on call physician: [phone]  Cell (7a-7p): [phone]       S/p HD yesterday with net 2.2L UF  Off pressor gtts at present; on high-dose Midodrine    VITAL:  T(C): , Max: 37.2 (11-13-23 @ 20:00)  T(F): , Max: 99 (11-13-23 @ 20:00)  HR: 106 (11-14-23 @ 07:00)  BP: 113/42  RR: 24 (11-14-23 @ 07:00)  SpO2: 97% (11-14-23 @ 07:00)      PHYSICAL EXAM:  Constitutional: critically ill, trach to vent   HEENT: + NGT, + tracheostomy   Respiratory: coarse BS  Cardiovascular: tachy s1s2  Gastrointestinal: BS+, soft, NT/ND  Extremities: ++ generalized edema  Neurological: UTO  : No Wheeler  Skin: No rashes  Access: TRISHA odom (accessed)    LABS:                        9.1    16.68 )-----------( 464      ( 14 Nov 2023 02:53 )             31.2     Na(136)/K(5.0)/Cl(100)/HCO3(28)/BUN(31)/Cr(1.33)Glu(117)/Ca(8.6)/Mg(2.40)/PO4(4.5)    11-14 @ 02:53  Na(134)/K(5.7)/Cl(101)/HCO3(27)/BUN(44)/Cr(1.72)Glu(144)/Ca(8.7)/Mg(2.60)/PO4(6.1)    11-13 @ 05:17  Na(135)/K(5.0)/Cl(101)/HCO3(25)/BUN(37)/Cr(1.54)Glu(164)/Ca(8.6)/Mg(2.40)/PO4(5.1)    11-12 @ 02:30        IMPRESSION: 73F w/ HTN, DM2, CVA, breast CA-bilateral mastectomy, recurrent aspiration pneumonia/respiratory failure, and CKD, 8/11/23 p/w acute hypercapnic respiratory failure; c/b RUSS    (1)Renal - RUSS on CKD4 ==>newly ESRD-HD as of this admission. Last dialyzed yesterday; due for next HD tomorrow    (2)CV- tenuous hemodynamics - intermittently requiring pressor gtt/on Midodrine 40q6h    (3)Pulm- trach/vent-dependent    (4)Anemia- on Epogen with HD    (5)GOC - s/p repeated discussions with family regarding GOC - family persisting with interest in aggressive measures.       RECOMMEND:  (1)Next HD tomorrow - 2.4kg UF as able  (2)PEG placement?  (3)Eventual LTCF with vent/HD  (4)Continued discussions with family regarding GOC      Jimmy Colon MD  [facility]  Office/on call physician: [phone]  Cell (7a-7p): [phone]

## 2023-11-14 NOTE — PROGRESS NOTE ADULT - ASSESSMENT
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 While in MICU patient noted with worsening renal failure requiring HD initiation, CGE/ Melena s/p EGD 8/31 found with esophagitis and erosive gastropathy, new right cerebellar infarct and fevers second to ESBL COLI and MSSA VAP, MSSA bacteremia, left ear otitis externa and drug rxn to cephalosporins Course further complicated by prolonged vent time s/p tracheostomy 9/1 and transferred to RCU 9/3 completed by recurrent fevers with high PIP, respiratory acidosis and concern for volume overloaded.   CTH repeated 9/26 for concern for encephalopathy - has known now - chronic bilateral cerebellar infarcts, L PCA infarct. L parietal hypodensity seen on prior CT likely artifactual  Repeat CTH 10/3 - unchanged  EEG 10/5 with L posterocentral/temporal spikes/sharp waves - doubt true focal seizure upon further review of EEG     Impression:   Suspect underlying movement d/o - PD?  Metabolic encephalopathy related to shock, infection, doubt new stroke  Multiple embolic strokes s/p ILR without events  Dementia   MSSA bacteremia, s/p Daptomycin  recurrent L otitis externa  Acute popliteal DVT on Eliquis   Anemia     Recommendations   [] care per MICU for persistent hypotension, sepsis  [] Abx per ID   [] has multiple areas of epileptogenic potential on EEG, no clear seizure correlate seen.  Repeat EEG for R facial twitching negative.   [] would stop Keppra to see if mental status improves, doubt true seizures -> now off with more eye openin  [] consider MRI brain once stable but doubt will change mgmt  [] mgmt of hypotension per primary team, remains full code  [] Abx per ID  [] C/w home Atorvastatin 10 mg qhs   [] continue to address GOC with family, poor prognosis    Katty Charles DO  Vascular Neurology  Office [phone]

## 2023-11-15 NOTE — PROGRESS NOTE ADULT - ASSESSMENT
72 y/o F well known to me from my housecal outpt practice. she was admitted at [facility] 7/12-7/22 w aspiration PNA, was treated w CEFEPIME, developed an allergic rash,  dCHF, + MAC on AFB culture, had been progressively getting more and more lethargic and dyspneic at home since DC.   In  am of 8/11/23  ptn presented with respiratory distress w hypoxia and hypercarbia requiring intubation 2/2 volume overload +/- Asp PNA      Neuro   not responsive  Baseline MS AOx3, aphasic   - h/o CVA , on aspirin and statin . resumed w feeding tube, ASA resumed 8/26  - eeg  2/2 tremors, no sz focus, right facial twitching, EEG has been negative. 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 probably embolic in nature, did not tolerate full AC in the past, STEPHANIE is neg , no shunts observed  - ptn is poorly reactive, rpt scan done, no further CVA seen.     Cardiac   cardiology following  CHFpEF   TTE 7/2023 with EF 59%, with severe LVH and diastolic dysfunction   off Levo drip , now only during HD  on midodrine 40 mg qid, droxidopa 600 tid, additional 200 mg prior to HD,     Pulmonary   Acute hypercapnic and hypoxemic respiratory failure   prolonged intubation, now  trache to  vent, has copious secretions      Renal   on HD via L IJ Shiley, tunneled catheter when clinically stable  HD MWF, midodrine and pressor assisted,   permacath  scheduled to be place in IR on 11/14      GI  NPO  on tube feeds  UGI bleed 8/31,  EGD/Colonoscopy: erosive gastropathy, esophagitis on colonoscopy old blood seen no active bleeding, poor prep  NGT-TF  s/p 2UPRBC 11/4    Endocrine  on Insulin: NPH, Admelog    ID   complicated infectious hospital course  treated for MSSA bacteremia, aspiration PNA.  sputum + for pseudomonas, ptn was initially on zosyn but was allergic, then was switched to aztreonam   Aztreonam was switched to polymyxin for a possible allergy but ptn didnt have a reaction to aztreonam, she had a reaction to Zosyn.   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 externa resolved    Heme/Onc  on eliquis for  LEFT POPLITEAL VEIN ACUTE DVT , on ELiquis    Ethics  GOC - Discussed GOC with daughter and , they have opted in the past for full code. and she remains full code at present

## 2023-11-15 NOTE — PROGRESS NOTE ADULT - SUBJECTIVE AND OBJECTIVE BOX
S:  Pt seen and examined. Neurologically unchanged      Medications: MEDICATIONS  (STANDING):  albuterol/ipratropium for Nebulization 3 milliLiter(s) Nebulizer every 6 hours  artificial tears (preservative free) Ophthalmic Solution 1 Drop(s) Both EYES every 4 hours  aspirin  chewable 81 milliGRAM(s) Enteral Tube daily  atorvastatin 10 milliGRAM(s) Oral at bedtime  calamine/zinc oxide Lotion 1 Application(s) Topical daily  chlorhexidine 0.12% Liquid 15 milliLiter(s) Oral Mucosa every 12 hours  chlorhexidine 2% Cloths 1 Application(s) Topical daily  dextrose 5%. 1000 milliLiter(s) (50 mL/Hr) IV Continuous <Continuous>  dextrose 5%. 1000 milliLiter(s) (100 mL/Hr) IV Continuous <Continuous>  dextrose 50% Injectable 12.5 Gram(s) IV Push once  dextrose 50% Injectable 25 Gram(s) IV Push once  dextrose 50% Injectable 25 Gram(s) IV Push once  dextrose 50% Injectable 25 Gram(s) IV Push once  droxidopa 600 milliGRAM(s) Oral every 8 hours  epoetin odalis (EPOGEN) Injectable 76104 Unit(s) IV Push <User Schedule>  ferrous    sulfate Liquid 300 milliGRAM(s) Oral daily  glucagon  Injectable 1 milliGRAM(s) IntraMuscular once  heparin   Injectable 5000 Unit(s) SubCutaneous every 8 hours  insulin lispro (ADMELOG) corrective regimen sliding scale   SubCutaneous every 6 hours  insulin NPH human recombinant 6 Unit(s) SubCutaneous every 6 hours  midodrine. 40 milliGRAM(s) Oral <User Schedule>  norepinephrine Infusion 0.05 MICROgram(s)/kG/Min (6.23 mL/Hr) IV Continuous <Continuous>  pantoprazole  Injectable 40 milliGRAM(s) IV Push two times a day  petrolatum Ophthalmic Ointment 1 Application(s) Both EYES four times a day  sodium chloride 1 Gram(s) Oral two times a day  sodium chloride 3%  Inhalation 4 milliLiter(s) Inhalation every 6 hours    MEDICATIONS  (PRN):  acetaminophen   Oral Liquid .. 650 milliGRAM(s) Oral every 6 hours PRN Temp greater or equal to 38C (100.4F), Mild Pain (1 - 3)  dextrose Oral Gel 15 Gram(s) Oral once PRN Blood Glucose LESS THAN 70 milliGRAM(s)/deciliter  diphenhydrAMINE Elixir 50 milliGRAM(s) Oral once PRN Rash and/or Itching  droxidopa 600 milliGRAM(s) Oral <User Schedule> PRN prior to hemodialysis  midodrine. 40 milliGRAM(s) Oral once PRN 1 Hour Pre HD  sodium chloride 0.9% Bolus. 250 milliLiter(s) IV Bolus every 5 minutes PRN SBP LESS THAN or EQUAL to 90 mmHg  sodium chloride 0.9% lock flush 10 milliLiter(s) IV Push every 1 hour PRN Pre/post blood products, medications, blood draw, and to maintain line patency       Vitals:  Vital Signs Last 24 Hrs  T(C): 37.3 (15 Nov 2023 12:00), Max: 37.4 (15 Nov 2023 06:50)  T(F): 99.2 (15 Nov 2023 12:00), Max: 99.3 (15 Nov 2023 06:50)  HR: 110 (15 Nov 2023 19:18) (89 - 128)  BP: --  BP(mean): --  RR: 24 (15 Nov 2023 18:00) (9 - 24)  SpO2: 100% (15 Nov 2023 19:18) (94% - 100%)    Parameters below as of 15 Nov 2023 18:00  Patient On (Oxygen Delivery Method): ventilator    O2 Concentration (%): 40    O2 Concentration (%): 40    LABS:                          9.0    18.02 )-----------( 538      ( 15 Nov 2023 03:05 )             30.7     11-15    136  |  100  |  36<H>  ----------------------------<  147<H>  5.2   |  25  |  1.57<H>    Ca    8.7      15 Nov 2023 03:05  Phos  4.8     11-15  Mg     2.50     11-15    TPro  6.4  /  Alb  1.9<L>  /  TBili  <0.2  /  DBili  x   /  AST  16  /  ALT  17  /  AlkPhos  337<H>  11-15    LIVER FUNCTIONS - ( 15 Nov 2023 03:05 )  Alb: 1.9 g/dL / Pro: 6.4 g/dL / ALK PHOS: 337 U/L / ALT: 17 U/L / AST: 16 U/L / GGT: x           PT/INR - ( 14 Nov 2023 02:53 )   PT: 12.0 sec;   INR: 1.06 ratio         PTT - ( 14 Nov 2023 02:53 )  PTT:31.6 sec  Urinalysis Basic - ( 15 Nov 2023 03:05 )    Color: x / Appearance: x / SG: x / pH: x  Gluc: 147 mg/dL / Ketone: x  / Bili: x / Urobili: x   Blood: x / Protein: x / Nitrite: x   Leuk Esterase: x / RBC: x / WBC x   Sq Epi: x / Non Sq Epi: x / Bacteria: x        Neurological Exam:  Mental Status: Eyes closed, minimal spont eye opening off sedation. Does not follow commands,  + trach to vent and NGT   Cranial Nerves: PERRL slightly sluggish, R facial twitching noted by mouth lessened today- ? suppressible. occasional head dystonia  Motor: Moves upper extremities spontaneously R >L, tremor R > L  no movement noted in lowers  Sensation: WD to noxious x4    I personally reviewed the below data/images/labs:        < from: CT Head No Cont (08.15.23 @ 17:20) >    ACC: [id number] EXAM:  CT BRAIN   ORDERED BY: MINAL SAM     PROCEDURE DATE:  08/15/2023          INTERPRETATION:  Clinical indication: Change in neuro exam.    Multiple axial sections were performed from base to vertex without   contrast enhancement. Coronal and sagittal reconstructions were   reformatted well.    This exam is compared prior head CT performed on August 11, 2023    Parenchymal volume loss and chronic microvessel ischemic changes are   again seen.    Abnormal low-attenuation involving the left occipital cortical   subcortical region is again seen. 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    No evidence of acute hemorrhage mass or mass effect is seen.    Evaluation of the osseous structures with appropriate window demonstrates   sclerotic changes about the left mastoid region which appears stable.   Opacification left middle ear region is again seen.    Patient is status post bilateral cataract surgery.    Impression: Stable exam.    < end of copied text >    vEEG:    Clinical Impression:  - Severe diffuse non-specific cerebral dysfunction  - There were no epileptiform abnormalities or seizures recorded.        CTH 8/11:    VENTRICLES AND SULCI: Age-appropriate involutional change  INTRA-AXIAL:  Old left PCA infarct as seen on the prior unchanged.   Microvascular ischemic changes involving the periventricular and   subcortical white matter as seen previously  EXTRA-AXIAL:  No mass or collection is seen.  VISUALIZED SINUSES:  Clear.  VISUALIZED MASTOIDS: Left mastoid sclerosis  CALVARIUM: Infiltrative appearance tothe calvarium may be indicative of   marrow infiltration on the basis of patient's known diagnosis of breast   cancer. MISCELLANEOUS:  None.    IMPRESSION:  No significant interval change compared with 7/17/2023 in   left PCA infarct which is old. Microvascular ischemic changes involving   the periventricular and subcortical white matter as seen   previously.Questionable lesions at the level of the calvarium related to   possible breast CA. Clinical correlation recommended.    --- End of Report ---      ct< from: CT Head No Cont (09.26.23 @ 21:02) >  IMPRESSION: Vague questionable areas of hypoattenuation within the   bilateral cerebellar hemispheres which may be compatible with   acute/subacute ischemia. Recommend further evaluation with a brain MRI   study, provided there are no MRI contraindications.    No acute intracranial hemorrhage.    Previously seen questionable vague wedge-shaped area of hypoattenuation   in the left parietal lobe with artifactual secondary to motion and volume   averaging with a prominent sulcus.    Chronic left occipital lobe infarct.    < end of copied text >    < from: CT Head No Cont (10.03.23 @ 20:46) >    IMPRESSION:    No acute intracranial hemorrhage or mass effect. Evaluation of the   posterior fossa degraded by streak artifact from dental amalgam.   Lucencies in the bilateral cerebellum may be artifactual.    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    Bilateral middle ear and mastoid effusions which are also seen on prior   exam.    EEG Classification / Summary:  Abnormal EEG in the awake, drowsy states.   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   -No seizures

## 2023-11-15 NOTE — PROGRESS NOTE ADULT - SUBJECTIVE AND OBJECTIVE BOX
NEPHROLOGY     Patient seen and examined.    MEDICATIONS  (STANDING):  albuterol/ipratropium for Nebulization 3 milliLiter(s) Nebulizer every 6 hours  artificial tears (preservative free) Ophthalmic Solution 1 Drop(s) Both EYES every 4 hours  aspirin  chewable 81 milliGRAM(s) Enteral Tube daily  atorvastatin 10 milliGRAM(s) Oral at bedtime  calamine/zinc oxide Lotion 1 Application(s) Topical daily  chlorhexidine 0.12% Liquid 15 milliLiter(s) Oral Mucosa every 12 hours  chlorhexidine 2% Cloths 1 Application(s) Topical daily  dextrose 5%. 1000 milliLiter(s) (100 mL/Hr) IV Continuous <Continuous>  dextrose 5%. 1000 milliLiter(s) (50 mL/Hr) IV Continuous <Continuous>  dextrose 50% Injectable 12.5 Gram(s) IV Push once  dextrose 50% Injectable 25 Gram(s) IV Push once  dextrose 50% Injectable 25 Gram(s) IV Push once  dextrose 50% Injectable 25 Gram(s) IV Push once  droxidopa 600 milliGRAM(s) Oral every 8 hours  epoetin odalis (EPOGEN) Injectable 08794 Unit(s) IV Push <User Schedule>  ferrous    sulfate Liquid 300 milliGRAM(s) Oral daily  glucagon  Injectable 1 milliGRAM(s) IntraMuscular once  heparin   Injectable 5000 Unit(s) SubCutaneous every 8 hours  insulin lispro (ADMELOG) corrective regimen sliding scale   SubCutaneous every 6 hours  insulin NPH human recombinant 6 Unit(s) SubCutaneous every 6 hours  midodrine. 40 milliGRAM(s) Oral <User Schedule>  norepinephrine Infusion 0.05 MICROgram(s)/kG/Min (6.23 mL/Hr) IV Continuous <Continuous>  pantoprazole  Injectable 40 milliGRAM(s) IV Push two times a day  petrolatum Ophthalmic Ointment 1 Application(s) Both EYES four times a day  sodium chloride 1 Gram(s) Oral two times a day  sodium chloride 3%  Inhalation 4 milliLiter(s) Inhalation every 6 hours    VITALS:  T(C): , Max: 37.4 (11-15-23 @ 06:50)  T(F): , Max: 99.3 (11-15-23 @ 06:50)  HR: 121 (11-15-23 @ 13:00)  RR: 24 (11-15-23 @ 13:00)  SpO2: 100% (11-15-23 @ 13:00)    PHYSICAL EXAM:  Constitutional: critically ill, trach to vent   HEENT: + NGT, + tracheostomy   Respiratory: coarse BS  Cardiovascular: tachy s1s2  Gastrointestinal: BS+, soft, NT/ND  Extremities: ++ generalized edema  Neurological: UTO  : No Wheeler  Skin: No rashes  Access: [facility]     LABS:                        9.0    18.02 )-----------( 538      ( 15 Nov 2023 03:05 )             30.7     11-15    136  |  100  |  36<H>  ----------------------------<  147<H>  5.2   |  25  |  1.57<H>    Ca    8.7      15 Nov 2023 03:05  Phos  4.8     11-15  Mg     2.50     11-15    TPro  6.4  /  Alb  1.9<L>  /  TBili  <0.2  /  DBili  x   /  AST  16  /  ALT  17  /  AlkPhos  337<H>  11-15   NEPHROLOGY     Patient seen and examined with family at bedside, trach to vent, s/p HD this morning removed 2.5L, levo gtt started now off.     MEDICATIONS  (STANDING):  albuterol/ipratropium for Nebulization 3 milliLiter(s) Nebulizer every 6 hours  artificial tears (preservative free) Ophthalmic Solution 1 Drop(s) Both EYES every 4 hours  aspirin  chewable 81 milliGRAM(s) Enteral Tube daily  atorvastatin 10 milliGRAM(s) Oral at bedtime  calamine/zinc oxide Lotion 1 Application(s) Topical daily  chlorhexidine 0.12% Liquid 15 milliLiter(s) Oral Mucosa every 12 hours  chlorhexidine 2% Cloths 1 Application(s) Topical daily  dextrose 5%. 1000 milliLiter(s) (100 mL/Hr) IV Continuous <Continuous>  dextrose 5%. 1000 milliLiter(s) (50 mL/Hr) IV Continuous <Continuous>  dextrose 50% Injectable 12.5 Gram(s) IV Push once  dextrose 50% Injectable 25 Gram(s) IV Push once  dextrose 50% Injectable 25 Gram(s) IV Push once  dextrose 50% Injectable 25 Gram(s) IV Push once  droxidopa 600 milliGRAM(s) Oral every 8 hours  epoetin odalis (EPOGEN) Injectable 61612 Unit(s) IV Push <User Schedule>  ferrous    sulfate Liquid 300 milliGRAM(s) Oral daily  glucagon  Injectable 1 milliGRAM(s) IntraMuscular once  heparin   Injectable 5000 Unit(s) SubCutaneous every 8 hours  insulin lispro (ADMELOG) corrective regimen sliding scale   SubCutaneous every 6 hours  insulin NPH human recombinant 6 Unit(s) SubCutaneous every 6 hours  midodrine. 40 milliGRAM(s) Oral <User Schedule>  norepinephrine Infusion 0.05 MICROgram(s)/kG/Min (6.23 mL/Hr) IV Continuous <Continuous>  pantoprazole  Injectable 40 milliGRAM(s) IV Push two times a day  petrolatum Ophthalmic Ointment 1 Application(s) Both EYES four times a day  sodium chloride 1 Gram(s) Oral two times a day  sodium chloride 3%  Inhalation 4 milliLiter(s) Inhalation every 6 hours    VITALS:  T(C): , Max: 37.4 (11-15-23 @ 06:50)  T(F): , Max: 99.3 (11-15-23 @ 06:50)  HR: 121 (11-15-23 @ 13:00)  RR: 24 (11-15-23 @ 13:00)  SpO2: 100% (11-15-23 @ 13:00)    PHYSICAL EXAM:  Constitutional: critically ill, trach to vent   HEENT: + NGT, + tracheostomy   Respiratory: coarse BS  Cardiovascular: tachy s1s2  Gastrointestinal: BS+, soft, NT/ND  Extremities: ++ generalized edema  Neurological: UTO  : No Wheeler  Skin: No rashes  Access: [facility]     LABS:                        9.0    18.02 )-----------( 538      ( 15 Nov 2023 03:05 )             30.7     11-15    136  |  100  |  36<H>  ----------------------------<  147<H>  5.2   |  25  |  1.57<H>    Ca    8.7      15 Nov 2023 03:05  Phos  4.8     11-15  Mg     2.50     11-15    TPro  6.4  /  Alb  1.9<L>  /  TBili  <0.2  /  DBili  x   /  AST  16  /  ALT  17  /  AlkPhos  337<H>  11-15

## 2023-11-15 NOTE — PROGRESS NOTE ADULT - SUBJECTIVE AND OBJECTIVE BOX
Subjective: Patient seen and examined. No new events except as noted.   remains in ICU       REVIEW OF SYSTEMS:  Unable to obtain     MEDICATIONS:  MEDICATIONS  (STANDING):  albuterol/ipratropium for Nebulization 3 milliLiter(s) Nebulizer every 6 hours  artificial tears (preservative free) Ophthalmic Solution 1 Drop(s) Both EYES every 4 hours  aspirin  chewable 81 milliGRAM(s) Enteral Tube daily  atorvastatin 10 milliGRAM(s) Oral at bedtime  calamine/zinc oxide Lotion 1 Application(s) Topical daily  chlorhexidine 0.12% Liquid 15 milliLiter(s) Oral Mucosa every 12 hours  chlorhexidine 2% Cloths 1 Application(s) Topical daily  dextrose 5%. 1000 milliLiter(s) (100 mL/Hr) IV Continuous <Continuous>  dextrose 5%. 1000 milliLiter(s) (50 mL/Hr) IV Continuous <Continuous>  dextrose 50% Injectable 25 Gram(s) IV Push once  dextrose 50% Injectable 12.5 Gram(s) IV Push once  dextrose 50% Injectable 25 Gram(s) IV Push once  dextrose 50% Injectable 25 Gram(s) IV Push once  droxidopa 600 milliGRAM(s) Oral every 8 hours  epoetin odalis (EPOGEN) Injectable 32139 Unit(s) IV Push <User Schedule>  ferrous    sulfate Liquid 300 milliGRAM(s) Oral daily  glucagon  Injectable 1 milliGRAM(s) IntraMuscular once  insulin lispro (ADMELOG) corrective regimen sliding scale   SubCutaneous every 6 hours  insulin NPH human recombinant 6 Unit(s) SubCutaneous every 6 hours  midodrine. 40 milliGRAM(s) Oral <User Schedule>  norepinephrine Infusion 0.05 MICROgram(s)/kG/Min (6.23 mL/Hr) IV Continuous <Continuous>  pantoprazole  Injectable 40 milliGRAM(s) IV Push two times a day  petrolatum Ophthalmic Ointment 1 Application(s) Both EYES four times a day  sodium chloride 1 Gram(s) Oral two times a day  sodium chloride 3%  Inhalation 4 milliLiter(s) Inhalation every 6 hours      PHYSICAL EXAM:  T(C): 36.6 (11-15-23 @ 08:00), Max: 37.4 (11-15-23 @ 06:50)  HR: 115 (11-15-23 @ 10:08) (89 - 126)  BP: --  RR: 24 (11-15-23 @ 09:00) (14 - 25)  SpO2: 99% (11-15-23 @ 10:08) (94% - 100%)  Wt(kg): --  I&O's Summary    14 Nov 2023 07:01  -  15 Nov 2023 07:00  --------------------------------------------------------  IN: 480 mL / OUT: 400 mL / NET: 80 mL    15 Nov 2023 07:01  -  15 Nov 2023 09:32  --------------------------------------------------------  IN: 134.8 mL / OUT: 0 mL / NET: 134.8 mL          Appearance: NAD, +trach  +NGT  HEENT: dry oral mucosa  Lymphatic: No lymphadenopathy  Cardiovascular: Normal S1 S2, No JVD, No murmurs, No edema  Respiratory: Decreased BS, + trach to vent	  Neuro: opens eyes  Gastrointestinal: Soft, Non-tender, + BS, + NGT  Skin: No rashes, No ecchymoses, No cyanosis	  Extremities: No strength/ROM 2/2 sedation, + BL LE edema  Vascular: Peripheral pulses palpable 2+ bilaterally  Anasarca   Mode: AC/ CMV (Assist Control/ Continuous Mandatory Ventilation), RR (machine): 24, FiO2: 40, PEEP: 8, ITime: 0.8, MAP: 19, PC: 35, PIP: 42  Plateau pressure:   P/F ratio:         LABS:    CARDIAC MARKERS:                                9.0    18.02 )-----------( 538      ( 15 Nov 2023 03:05 )             30.7     11-15    136  |  100  |  36<H>  ----------------------------<  147<H>  5.2   |  25  |  1.57<H>    Ca    8.7      15 Nov 2023 03:05  Phos  4.8     11-15  Mg     2.50     11-15    TPro  6.4  /  Alb  1.9<L>  /  TBili  <0.2  /  DBili  x   /  AST  16  /  ALT  17  /  AlkPhos  337<H>  11-15    proBNP:   Lipid Profile:   HgA1c:   TSH:             TELEMETRY: 	 SR   ECG:  	  RADIOLOGY:   DIAGNOSTIC TESTING:  [ ] Echocardiogram:  [ ]  Catheterization:  [ ] Stress Test:    OTHER:

## 2023-11-15 NOTE — PROGRESS NOTE ADULT - ASSESSMENT
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 While in MICU patient noted with worsening renal failure requiring HD initiation, CGE/ Melena s/p EGD 8/31 found with esophagitis and erosive gastropathy, new right cerebellar infarct and fevers second to ESBL COLI and MSSA VAP, MSSA bacteremia, left ear otitis externa and drug rxn to cephalosporins Course further complicated by prolonged vent time s/p tracheostomy 9/1 and transferred to RCU 9/3 completed by recurrent fevers with high PIP, respiratory acidosis and concern for volume overloaded.   CTH repeated 9/26 for concern for encephalopathy - has known now - chronic bilateral cerebellar infarcts, L PCA infarct. L parietal hypodensity seen on prior CT likely artifactual  Repeat CTH 10/3 - unchanged  EEG 10/5 with L posterocentral/temporal spikes/sharp waves - doubt true focal seizure upon further review of EEG     Impression:   Suspect underlying movement d/o - PD?  Metabolic encephalopathy related to shock, infection, bihemispheric infarcts  Multiple embolic strokes s/p ILR without events  Dementia   MSSA bacteremia, s/p Daptomycin  recurrent L otitis externa  Acute popliteal DVT on Eliquis   Anemia     Recommendations   [] care per MICU for persistent hypotension, sepsis  [] Abx per ID   [] has multiple areas of epileptogenic potential on EEG, no clear seizure correlate seen.  Repeat EEG for R facial twitching negative.   [] would stop Keppra to see if mental status improves, doubt true seizures -> now off with more eye openin  [] consider MRI brain once stable but doubt will change mgmt  [] mgmt of hypotension per primary team, remains full code  [] Abx per ID  [] C/w home Atorvastatin 10 mg qhs   [] continue to address GOC with family, poor prognosis    Katty Charles DO  Vascular Neurology  Office [phone]

## 2023-11-15 NOTE — PROGRESS NOTE ADULT - SUBJECTIVE AND OBJECTIVE BOX
Significant recent/past 24 hr events:    Subjective:    Review of Systems         [ ] A ten-point review of systems was otherwise negative except as noted.  [ ] Due to altered mental status/intubation, subjective information were not able to be obtained from the patient. History was obtained, to the extent possible, from review of the chart and collateral sources of information.      Patient is a 73y old  Female who presents with a chief complaint of Respiratory distress (14 Nov 2023 23:41)    HPI:  72 y/o F DM on insulin, HTN, CKD,breast CA, respiratory arrest and cardiac arrest (2018), CVA with residual weakness, aspiration PNA h/o trache, PEG, both removed presents with respiratory distress requiring intubation. Had recent admission d/c 7/22/23 for cough and respiratory distress (no intubation) thought to be i/s/o aspiration PNA s/p cefepime course; developed rash in response. Infectious w/u was negative at this time. Was discharged home, daughter and  at bedside providing history.     Reports that she was initially improving with O2 sats in the high 90s on room air, however, then became more lethargic and not herself (baseline mental status AOx3). Also had worsening shortness of breath while laying down and leg swelling. Contacted cardiologist who started her on bumex 1mg daily. Developed low sugars overnight before admission and was given juice with some food, daughter reports no coughing during episode. At around 4-5 AM developed vomiting and coughing, O2 sats dropped to 80s and EMS was called. Denies fevers/chills, dysuria or urinary frequency, chest pain, palpitations. Uses wheelchair at home however family moves her around frequently.     In ED, was on BiPAP with increased WOB and was intubated. Found to have elevated pro-BNP and CO2 of 111 on VBG. CXR with small right pleural effusion, started on vanc in the ED.  (11 Aug 2023 09:08)    PAST MEDICAL & SURGICAL HISTORY:  Breast CA      Diabetes      Stroke      Cardiac arrest      HTN (hypertension)      H/O mastectomy, bilateral        FAMILY HISTORY:  No pertinent family history in first degree relatives        Vitals   ICU Vital Signs Last 24 Hrs  T(C): 36.8 (15 Nov 2023 00:00), Max: 37.2 (14 Nov 2023 20:00)  T(F): 98.2 (15 Nov 2023 00:00), Max: 99 (14 Nov 2023 20:00)  HR: 126 (15 Nov 2023 05:00) (89 - 126)  BP: --  BP(mean): --  ABP: 127/47 (15 Nov 2023 05:00) (113/42 - 138/60)  ABP(mean): 80 (15 Nov 2023 05:00) (71 - 93)  RR: 20 (15 Nov 2023 05:00) (10 - 25)  SpO2: 95% (15 Nov 2023 05:00) (95% - 100%)    O2 Parameters below as of 15 Nov 2023 06:00  Patient On (Oxygen Delivery Method): ventilator            Physical Exam:   Constitutional: NAD, well-groomed, well-developed  HEENT: PERRLA, EOMI, no drainage or redness  Neck: supple,  No JVD, Trachea midline  Back: Normal spine flexure, No CVA tenderness, No deformity or limitation of movement  Respiratory: Breath Sounds equal & clear bilaterally to auscultation, no accessory muscle use noted  Cardiovascular: Regular rate, regular rhythm, normal S1, S2; no murmurs or rub  Gastrointestinal: Soft, non-tender, non distended, no hepatosplenomegaly, normal bowel sounds  Extremities: CENTENO x 4, no peripheral edema, no cyanosis, no clubbing   Vascular: Equal and normal pulses: 2+ peripheral pulses throughout  Neurological: A+O x 3; speech clear and intact; no sensory, motor  deficits, normal reflexes  Psychiatric: calm, normal mood, normal affect  Musculoskeletal: No joint swelling or deformity; no limitation of movement  Skin: warm, dry, well perfused, no rashes    VENT SETTINGS   Mode: AC/ CMV (Assist Control/ Continuous Mandatory Ventilation)  RR (machine): 24  FiO2: 40  PEEP: 8  ITime: 0.8  MAP: 10  PC: 35  PIP: 38        I&O's Detail    13 Nov 2023 07:01  -  14 Nov 2023 07:00  --------------------------------------------------------  IN:    Enteral Tube Flush: 150 mL    Glucerna 1.5: 460 mL    Nepro with Carb Steady: 90 mL    Norepinephrine: 11.2 mL    Other (mL): 400 mL  Total IN: 1111.2 mL    OUT:    Other (mL): 2600 mL    Stool (mL): 300 mL  Total OUT: 2900 mL    Total NET: -1788.8 mL      14 Nov 2023 07:01  -  15 Nov 2023 06:11  --------------------------------------------------------  IN:    Enteral Tube Flush: 90 mL    Nepro with Carb Steady: 360 mL  Total IN: 450 mL    OUT:    Rectal Tube (mL): 100 mL  Total OUT: 100 mL    Total NET: 350 mL          LABS                        9.0    18.02 )-----------( 538      ( 15 Nov 2023 03:05 )             30.7     11-15    136  |  100  |  36<H>  ----------------------------<  147<H>  5.2   |  25  |  1.57<H>    Ca    8.7      15 Nov 2023 03:05  Phos  4.8     11-15  Mg     2.50     11-15    TPro  6.4  /  Alb  1.9<L>  /  TBili  <0.2  /  DBili  x   /  AST  16  /  ALT  17  /  AlkPhos  337<H>  11-15    LIVER FUNCTIONS - ( 15 Nov 2023 03:05 )  Alb: 1.9 g/dL / Pro: 6.4 g/dL / ALK PHOS: 337 U/L / ALT: 17 U/L / AST: 16 U/L / GGT: x           PT/INR - ( 14 Nov 2023 02:53 )   PT: 12.0 sec;   INR: 1.06 ratio         PTT - ( 14 Nov 2023 02:53 )  PTT:31.6 sec        Urinalysis Basic - ( 15 Nov 2023 03:05 )    Color: x / Appearance: x / SG: x / pH: x  Gluc: 147 mg/dL / Ketone: x  / Bili: x / Urobili: x   Blood: x / Protein: x / Nitrite: x   Leuk Esterase: x / RBC: x / WBC x   Sq Epi: x / Non Sq Epi: x / Bacteria: x      POCT Blood Glucose.: 129 mg/dL *H* (11-14-23 @ 23:24)  POCT Blood Glucose.: 110 mg/dL *H* (11-14-23 @ 17:32)  POCT Blood Glucose.: 98 mg/dL (11-14-23 @ 11:37)  POCT Blood Glucose.: 98 mg/dL (11-14-23 @ 06:40)        MEDICATIONS  (STANDING):  albuterol/ipratropium for Nebulization 3 milliLiter(s) Nebulizer every 6 hours  artificial tears (preservative free) Ophthalmic Solution 1 Drop(s) Both EYES every 4 hours  aspirin  chewable 81 milliGRAM(s) Enteral Tube daily  atorvastatin 10 milliGRAM(s) Oral at bedtime  calamine/zinc oxide Lotion 1 Application(s) Topical daily  chlorhexidine 0.12% Liquid 15 milliLiter(s) Oral Mucosa every 12 hours  chlorhexidine 2% Cloths 1 Application(s) Topical daily  dextrose 5%. 1000 milliLiter(s) (50 mL/Hr) IV Continuous <Continuous>  dextrose 5%. 1000 milliLiter(s) (100 mL/Hr) IV Continuous <Continuous>  dextrose 50% Injectable 12.5 Gram(s) IV Push once  dextrose 50% Injectable 25 Gram(s) IV Push once  dextrose 50% Injectable 25 Gram(s) IV Push once  dextrose 50% Injectable 25 Gram(s) IV Push once  droxidopa 600 milliGRAM(s) Oral every 8 hours  epoetin odalis (EPOGEN) Injectable 65883 Unit(s) IV Push <User Schedule>  ferrous    sulfate Liquid 300 milliGRAM(s) Oral daily  glucagon  Injectable 1 milliGRAM(s) IntraMuscular once  insulin lispro (ADMELOG) corrective regimen sliding scale   SubCutaneous every 6 hours  insulin NPH human recombinant 6 Unit(s) SubCutaneous every 6 hours  midodrine. 40 milliGRAM(s) Oral <User Schedule>  norepinephrine Infusion 0.05 MICROgram(s)/kG/Min (6.23 mL/Hr) IV Continuous <Continuous>  pantoprazole  Injectable 40 milliGRAM(s) IV Push two times a day  petrolatum Ophthalmic Ointment 1 Application(s) Both EYES four times a day  sodium chloride 1 Gram(s) Oral two times a day  sodium chloride 3%  Inhalation 4 milliLiter(s) Inhalation every 6 hours    MEDICATIONS  (PRN):  acetaminophen   Oral Liquid .. 650 milliGRAM(s) Oral every 6 hours PRN Temp greater or equal to 38C (100.4F), Mild Pain (1 - 3)  dextrose Oral Gel 15 Gram(s) Oral once PRN Blood Glucose LESS THAN 70 milliGRAM(s)/deciliter  diphenhydrAMINE Elixir 50 milliGRAM(s) Oral once PRN Rash and/or Itching  droxidopa 600 milliGRAM(s) Oral <User Schedule> PRN prior to hemodialysis  midodrine. 40 milliGRAM(s) Oral once PRN 1 Hour Pre HD  sodium chloride 0.9% Bolus. 250 milliLiter(s) IV Bolus every 5 minutes PRN SBP LESS THAN or EQUAL to 90 mmHg  sodium chloride 0.9% lock flush 10 milliLiter(s) IV Push every 1 hour PRN Pre/post blood products, medications, blood draw, and to maintain line patency      Allergies:  isoniazid (Rash)  nafcillin (Unknown)  hydrALAZINE (Rash)  vitamin E (Short breath; Urticaria; Hives)  doxycycline (Rash)  cefepime (Rash)  NIFEdipine (Urticaria; Hives)  Zosyn (Rash)        CRITICAL CARE TIME SPENT:  minutes of critical care time spent providing medical care for patient's acute illness/conditions that impairs at least one vital organ system and/or poses a high risk of imminent or life threatening deterioration in the patient's condition. It includes time spent evaluating and treating the patient's acute illness as well as time spent reviewing labs, radiology, discussing goals of care with patient and/or patient's family, and discussing the case with a multidisciplinary team, in an effort to prevent further life threatening deterioration or end organ damage. This time is independent of any procedures performed.         Significant recent/past 24 hr events: no significant events          Subjective: pt unable to provide any history in s/o cognitive impairment     Review of Systems         [ ] A ten-point review of systems was otherwise negative except as noted.  [ ] Due to altered mental status/intubation, subjective information were not able to be obtained from the patient. History was obtained, to the extent possible, from review of the chart and collateral sources of information.      Patient is a 73y old  Female who presents with a chief complaint of Respiratory distress (14 Nov 2023 23:41)    HPI:  72 y/o F DM on insulin, HTN, CKD,breast CA, respiratory arrest and cardiac arrest (2018), CVA with residual weakness, aspiration PNA h/o trache, PEG, both removed presents with respiratory distress requiring intubation. Had recent admission d/c 7/22/23 for cough and respiratory distress (no intubation) thought to be i/s/o aspiration PNA s/p cefepime course; developed rash in response. Infectious w/u was negative at this time. Was discharged home, daughter and  at bedside providing history.     Reports that she was initially improving with O2 sats in the high 90s on room air, however, then became more lethargic and not herself (baseline mental status AOx3). Also had worsening shortness of breath while laying down and leg swelling. Contacted cardiologist who started her on bumex 1mg daily. Developed low sugars overnight before admission and was given juice with some food, daughter reports no coughing during episode. At around 4-5 AM developed vomiting and coughing, O2 sats dropped to 80s and EMS was called. Denies fevers/chills, dysuria or urinary frequency, chest pain, palpitations. Uses wheelchair at home however family moves her around frequently.     In ED, was on BiPAP with increased WOB and was intubated. Found to have elevated pro-BNP and CO2 of 111 on VBG. CXR with small right pleural effusion, started on vanc in the ED.  (11 Aug 2023 09:08)    PAST MEDICAL & SURGICAL HISTORY:  Breast CA      Diabetes      Stroke      Cardiac arrest      HTN (hypertension)      H/O mastectomy, bilateral        FAMILY HISTORY:  No pertinent family history in first degree relatives        Vitals   ICU Vital Signs Last 24 Hrs  T(C): 36.8 (15 Nov 2023 00:00), Max: 37.2 (14 Nov 2023 20:00)  T(F): 98.2 (15 Nov 2023 00:00), Max: 99 (14 Nov 2023 20:00)  HR: 126 (15 Nov 2023 05:00) (89 - 126)  BP: --  BP(mean): --  ABP: 127/47 (15 Nov 2023 05:00) (113/42 - 138/60)  ABP(mean): 80 (15 Nov 2023 05:00) (71 - 93)  RR: 20 (15 Nov 2023 05:00) (10 - 25)  SpO2: 95% (15 Nov 2023 05:00) (95% - 100%)    O2 Parameters below as of 15 Nov 2023 06:00  Patient On (Oxygen Delivery Method): ventilator            Physical Exam:   Constitutional: NAD, well-groomed, well-developed  HEENT: PERRLA, EOMI, no drainage or redness  Neck: supple,  No JVD, Trachea midline  Back: Normal spine flexure, No CVA tenderness, No deformity or limitation of movement  Respiratory: Breath Sounds equal & clear bilaterally to auscultation, no accessory muscle use noted  Cardiovascular: Regular rate, regular rhythm, normal S1, S2; no murmurs or rub  Gastrointestinal: Soft, non-tender, non distended, no hepatosplenomegaly, normal bowel sounds  Extremities: CENTENO x 4, no peripheral edema, no cyanosis, no clubbing   Vascular: Equal and normal pulses: 2+ peripheral pulses throughout  Neurological: A+O x 3; speech clear and intact; no sensory, motor  deficits, normal reflexes  Psychiatric: calm, normal mood, normal affect  Musculoskeletal: No joint swelling or deformity; no limitation of movement  Skin: warm, dry, well perfused, no rashes    VENT SETTINGS   Mode: AC/ CMV (Assist Control/ Continuous Mandatory Ventilation)  RR (machine): 24  FiO2: 40  PEEP: 8  ITime: 0.8  MAP: 10  PC: 35  PIP: 38        I&O's Detail    13 Nov 2023 07:01  -  14 Nov 2023 07:00  --------------------------------------------------------  IN:    Enteral Tube Flush: 150 mL    Glucerna 1.5: 460 mL    Nepro with Carb Steady: 90 mL    Norepinephrine: 11.2 mL    Other (mL): 400 mL  Total IN: 1111.2 mL    OUT:    Other (mL): 2600 mL    Stool (mL): 300 mL  Total OUT: 2900 mL    Total NET: -1788.8 mL      14 Nov 2023 07:01  -  15 Nov 2023 06:11  --------------------------------------------------------  IN:    Enteral Tube Flush: 90 mL    Nepro with Carb Steady: 360 mL  Total IN: 450 mL    OUT:    Rectal Tube (mL): 100 mL  Total OUT: 100 mL    Total NET: 350 mL          LABS                        9.0    18.02 )-----------( 538      ( 15 Nov 2023 03:05 )             30.7     11-15    136  |  100  |  36<H>  ----------------------------<  147<H>  5.2   |  25  |  1.57<H>    Ca    8.7      15 Nov 2023 03:05  Phos  4.8     11-15  Mg     2.50     11-15    TPro  6.4  /  Alb  1.9<L>  /  TBili  <0.2  /  DBili  x   /  AST  16  /  ALT  17  /  AlkPhos  337<H>  11-15    LIVER FUNCTIONS - ( 15 Nov 2023 03:05 )  Alb: 1.9 g/dL / Pro: 6.4 g/dL / ALK PHOS: 337 U/L / ALT: 17 U/L / AST: 16 U/L / GGT: x           PT/INR - ( 14 Nov 2023 02:53 )   PT: 12.0 sec;   INR: 1.06 ratio         PTT - ( 14 Nov 2023 02:53 )  PTT:31.6 sec        Urinalysis Basic - ( 15 Nov 2023 03:05 )    Color: x / Appearance: x / SG: x / pH: x  Gluc: 147 mg/dL / Ketone: x  / Bili: x / Urobili: x   Blood: x / Protein: x / Nitrite: x   Leuk Esterase: x / RBC: x / WBC x   Sq Epi: x / Non Sq Epi: x / Bacteria: x      POCT Blood Glucose.: 129 mg/dL *H* (11-14-23 @ 23:24)  POCT Blood Glucose.: 110 mg/dL *H* (11-14-23 @ 17:32)  POCT Blood Glucose.: 98 mg/dL (11-14-23 @ 11:37)  POCT Blood Glucose.: 98 mg/dL (11-14-23 @ 06:40)        MEDICATIONS  (STANDING):  albuterol/ipratropium for Nebulization 3 milliLiter(s) Nebulizer every 6 hours  artificial tears (preservative free) Ophthalmic Solution 1 Drop(s) Both EYES every 4 hours  aspirin  chewable 81 milliGRAM(s) Enteral Tube daily  atorvastatin 10 milliGRAM(s) Oral at bedtime  calamine/zinc oxide Lotion 1 Application(s) Topical daily  chlorhexidine 0.12% Liquid 15 milliLiter(s) Oral Mucosa every 12 hours  chlorhexidine 2% Cloths 1 Application(s) Topical daily  dextrose 5%. 1000 milliLiter(s) (50 mL/Hr) IV Continuous <Continuous>  dextrose 5%. 1000 milliLiter(s) (100 mL/Hr) IV Continuous <Continuous>  dextrose 50% Injectable 12.5 Gram(s) IV Push once  dextrose 50% Injectable 25 Gram(s) IV Push once  dextrose 50% Injectable 25 Gram(s) IV Push once  dextrose 50% Injectable 25 Gram(s) IV Push once  droxidopa 600 milliGRAM(s) Oral every 8 hours  epoetin odalis (EPOGEN) Injectable 39404 Unit(s) IV Push <User Schedule>  ferrous    sulfate Liquid 300 milliGRAM(s) Oral daily  glucagon  Injectable 1 milliGRAM(s) IntraMuscular once  insulin lispro (ADMELOG) corrective regimen sliding scale   SubCutaneous every 6 hours  insulin NPH human recombinant 6 Unit(s) SubCutaneous every 6 hours  midodrine. 40 milliGRAM(s) Oral <User Schedule>  norepinephrine Infusion 0.05 MICROgram(s)/kG/Min (6.23 mL/Hr) IV Continuous <Continuous>  pantoprazole  Injectable 40 milliGRAM(s) IV Push two times a day  petrolatum Ophthalmic Ointment 1 Application(s) Both EYES four times a day  sodium chloride 1 Gram(s) Oral two times a day  sodium chloride 3%  Inhalation 4 milliLiter(s) Inhalation every 6 hours    MEDICATIONS  (PRN):  acetaminophen   Oral Liquid .. 650 milliGRAM(s) Oral every 6 hours PRN Temp greater or equal to 38C (100.4F), Mild Pain (1 - 3)  dextrose Oral Gel 15 Gram(s) Oral once PRN Blood Glucose LESS THAN 70 milliGRAM(s)/deciliter  diphenhydrAMINE Elixir 50 milliGRAM(s) Oral once PRN Rash and/or Itching  droxidopa 600 milliGRAM(s) Oral <User Schedule> PRN prior to hemodialysis  midodrine. 40 milliGRAM(s) Oral once PRN 1 Hour Pre HD  sodium chloride 0.9% Bolus. 250 milliLiter(s) IV Bolus every 5 minutes PRN SBP LESS THAN or EQUAL to 90 mmHg  sodium chloride 0.9% lock flush 10 milliLiter(s) IV Push every 1 hour PRN Pre/post blood products, medications, blood draw, and to maintain line patency      Allergies:  isoniazid (Rash)  nafcillin (Unknown)  hydrALAZINE (Rash)  vitamin E (Short breath; Urticaria; Hives)  doxycycline (Rash)  cefepime (Rash)  NIFEdipine (Urticaria; Hives)  Zosyn (Rash)        CRITICAL CARE TIME SPENT:  minutes of critical care time spent providing medical care for patient's acute illness/conditions that impairs at least one vital organ system and/or poses a high risk of imminent or life threatening deterioration in the patient's condition. It includes time spent evaluating and treating the patient's acute illness as well as time spent reviewing labs, radiology, discussing goals of care with patient and/or patient's family, and discussing the case with a multidisciplinary team, in an effort to prevent further life threatening deterioration or end organ damage. This time is independent of any procedures performed.         Significant recent/past 24 hr events: Levophed requirements went to 0.14 with HD today and is down to 0.08         Subjective: pt unable to provide any history in s/o cognitive impairment     Review of Systems         [ ] A ten-point review of systems was otherwise negative except as noted.  [ ] Due to altered mental status/intubation, subjective information were not able to be obtained from the patient. History was obtained, to the extent possible, from review of the chart and collateral sources of information.      Patient is a 73y old  Female who presents with a chief complaint of Respiratory distress (14 Nov 2023 23:41)    HPI:  72 y/o F DM on insulin, HTN, CKD,breast CA, respiratory arrest and cardiac arrest (2018), CVA with residual weakness, aspiration PNA h/o trache, PEG, both removed presents with respiratory distress requiring intubation. Had recent admission d/c 7/22/23 for cough and respiratory distress (no intubation) thought to be i/s/o aspiration PNA s/p cefepime course; developed rash in response. Infectious w/u was negative at this time. Was discharged home, daughter and  at bedside providing history.     Reports that she was initially improving with O2 sats in the high 90s on room air, however, then became more lethargic and not herself (baseline mental status AOx3). Also had worsening shortness of breath while laying down and leg swelling. Contacted cardiologist who started her on bumex 1mg daily. Developed low sugars overnight before admission and was given juice with some food, daughter reports no coughing during episode. At around 4-5 AM developed vomiting and coughing, O2 sats dropped to 80s and EMS was called. Denies fevers/chills, dysuria or urinary frequency, chest pain, palpitations. Uses wheelchair at home however family moves her around frequently.     In ED, was on BiPAP with increased WOB and was intubated. Found to have elevated pro-BNP and CO2 of 111 on VBG. CXR with small right pleural effusion, started on vanc in the ED.  (11 Aug 2023 09:08)    PAST MEDICAL & SURGICAL HISTORY:  Breast CA      Diabetes      Stroke      Cardiac arrest      HTN (hypertension)      H/O mastectomy, bilateral        FAMILY HISTORY:  No pertinent family history in first degree relatives        Vitals   ICU Vital Signs Last 24 Hrs  T(C): 36.8 (15 Nov 2023 00:00), Max: 37.2 (14 Nov 2023 20:00)  T(F): 98.2 (15 Nov 2023 00:00), Max: 99 (14 Nov 2023 20:00)  HR: 126 (15 Nov 2023 05:00) (89 - 126)  BP: --  BP(mean): --  ABP: 127/47 (15 Nov 2023 05:00) (113/42 - 138/60)  ABP(mean): 80 (15 Nov 2023 05:00) (71 - 93)  RR: 20 (15 Nov 2023 05:00) (10 - 25)  SpO2: 95% (15 Nov 2023 05:00) (95% - 100%)    O2 Parameters below as of 15 Nov 2023 06:00  Patient On (Oxygen Delivery Method): ventilator            Physical Exam:   Constitutional: NAD, well-groomed, well-developed  HEENT: PERRLA, EOMI, no drainage or redness  Neck: supple,  No JVD, Trachea midline  Back: Normal spine flexure, No CVA tenderness, No deformity or limitation of movement  Respiratory: Breath Sounds diminished bilaterally to auscultation, no accessory muscle use noted  Cardiovascular: Regular rate, regular rhythm, normal S1, S2; no murmurs or rub  Gastrointestinal: Soft, non-tender, non distended, no hepatosplenomegaly, normal bowel sounds  Extremities: 3+ peripheral edema, no cyanosis, no clubbing   Vascular: Equal and normal pulses: 2+ peripheral pulses throughout  Neurological: opens eyes spontaneously, sometimes shakes her head, no sensory, motor  deficits, normal reflexes  Psychiatric: calm, normal mood, normal affect  Musculoskeletal: No joint swelling or deformity; no limitation of movement  Skin: warm, dry, well perfused, no rashes    VENT SETTINGS   Mode: AC/ CMV (Assist Control/ Continuous Mandatory Ventilation)  RR (machine): 24  FiO2: 40  PEEP: 8  ITime: 0.8  MAP: 10  PC: 35  PIP: 38        I&O's Detail    13 Nov 2023 07:01  -  14 Nov 2023 07:00  --------------------------------------------------------  IN:    Enteral Tube Flush: 150 mL    Glucerna 1.5: 460 mL    Nepro with Carb Steady: 90 mL    Norepinephrine: 11.2 mL    Other (mL): 400 mL  Total IN: 1111.2 mL    OUT:    Other (mL): 2600 mL    Stool (mL): 300 mL  Total OUT: 2900 mL    Total NET: -1788.8 mL      14 Nov 2023 07:01  -  15 Nov 2023 06:11  --------------------------------------------------------  IN:    Enteral Tube Flush: 90 mL    Nepro with Carb Steady: 360 mL  Total IN: 450 mL    OUT:    Rectal Tube (mL): 100 mL  Total OUT: 100 mL    Total NET: 350 mL          LABS                        9.0    18.02 )-----------( 538      ( 15 Nov 2023 03:05 )             30.7     11-15    136  |  100  |  36<H>  ----------------------------<  147<H>  5.2   |  25  |  1.57<H>    Ca    8.7      15 Nov 2023 03:05  Phos  4.8     11-15  Mg     2.50     11-15    TPro  6.4  /  Alb  1.9<L>  /  TBili  <0.2  /  DBili  x   /  AST  16  /  ALT  17  /  AlkPhos  337<H>  11-15    LIVER FUNCTIONS - ( 15 Nov 2023 03:05 )  Alb: 1.9 g/dL / Pro: 6.4 g/dL / ALK PHOS: 337 U/L / ALT: 17 U/L / AST: 16 U/L / GGT: x           PT/INR - ( 14 Nov 2023 02:53 )   PT: 12.0 sec;   INR: 1.06 ratio         PTT - ( 14 Nov 2023 02:53 )  PTT:31.6 sec        Urinalysis Basic - ( 15 Nov 2023 03:05 )    Color: x / Appearance: x / SG: x / pH: x  Gluc: 147 mg/dL / Ketone: x  / Bili: x / Urobili: x   Blood: x / Protein: x / Nitrite: x   Leuk Esterase: x / RBC: x / WBC x   Sq Epi: x / Non Sq Epi: x / Bacteria: x      POCT Blood Glucose.: 129 mg/dL *H* (11-14-23 @ 23:24)  POCT Blood Glucose.: 110 mg/dL *H* (11-14-23 @ 17:32)  POCT Blood Glucose.: 98 mg/dL (11-14-23 @ 11:37)  POCT Blood Glucose.: 98 mg/dL (11-14-23 @ 06:40)        MEDICATIONS  (STANDING):  albuterol/ipratropium for Nebulization 3 milliLiter(s) Nebulizer every 6 hours  artificial tears (preservative free) Ophthalmic Solution 1 Drop(s) Both EYES every 4 hours  aspirin  chewable 81 milliGRAM(s) Enteral Tube daily  atorvastatin 10 milliGRAM(s) Oral at bedtime  calamine/zinc oxide Lotion 1 Application(s) Topical daily  chlorhexidine 0.12% Liquid 15 milliLiter(s) Oral Mucosa every 12 hours  chlorhexidine 2% Cloths 1 Application(s) Topical daily  dextrose 5%. 1000 milliLiter(s) (50 mL/Hr) IV Continuous <Continuous>  dextrose 5%. 1000 milliLiter(s) (100 mL/Hr) IV Continuous <Continuous>  dextrose 50% Injectable 12.5 Gram(s) IV Push once  dextrose 50% Injectable 25 Gram(s) IV Push once  dextrose 50% Injectable 25 Gram(s) IV Push once  dextrose 50% Injectable 25 Gram(s) IV Push once  droxidopa 600 milliGRAM(s) Oral every 8 hours  epoetin odalis (EPOGEN) Injectable 27351 Unit(s) IV Push <User Schedule>  ferrous    sulfate Liquid 300 milliGRAM(s) Oral daily  glucagon  Injectable 1 milliGRAM(s) IntraMuscular once  insulin lispro (ADMELOG) corrective regimen sliding scale   SubCutaneous every 6 hours  insulin NPH human recombinant 6 Unit(s) SubCutaneous every 6 hours  midodrine. 40 milliGRAM(s) Oral <User Schedule>  norepinephrine Infusion 0.05 MICROgram(s)/kG/Min (6.23 mL/Hr) IV Continuous <Continuous>  pantoprazole  Injectable 40 milliGRAM(s) IV Push two times a day  petrolatum Ophthalmic Ointment 1 Application(s) Both EYES four times a day  sodium chloride 1 Gram(s) Oral two times a day  sodium chloride 3%  Inhalation 4 milliLiter(s) Inhalation every 6 hours    MEDICATIONS  (PRN):  acetaminophen   Oral Liquid .. 650 milliGRAM(s) Oral every 6 hours PRN Temp greater or equal to 38C (100.4F), Mild Pain (1 - 3)  dextrose Oral Gel 15 Gram(s) Oral once PRN Blood Glucose LESS THAN 70 milliGRAM(s)/deciliter  diphenhydrAMINE Elixir 50 milliGRAM(s) Oral once PRN Rash and/or Itching  droxidopa 600 milliGRAM(s) Oral <User Schedule> PRN prior to hemodialysis  midodrine. 40 milliGRAM(s) Oral once PRN 1 Hour Pre HD  sodium chloride 0.9% Bolus. 250 milliLiter(s) IV Bolus every 5 minutes PRN SBP LESS THAN or EQUAL to 90 mmHg  sodium chloride 0.9% lock flush 10 milliLiter(s) IV Push every 1 hour PRN Pre/post blood products, medications, blood draw, and to maintain line patency      Allergies:  isoniazid (Rash)  nafcillin (Unknown)  hydrALAZINE (Rash)  vitamin E (Short breath; Urticaria; Hives)  doxycycline (Rash)  cefepime (Rash)  NIFEdipine (Urticaria; Hives)  Zosyn (Rash)

## 2023-11-15 NOTE — PROGRESS NOTE ADULT - PROBLEM SELECTOR PLAN 1
Detail Level: Detailed Multifactorial     - Aspiration, acute on chronic diastolic CHF   s/p trach 9/1  now with likely sepsis   On Abx  Prognosis very poor. Discussed with  an daughter at length at bedside High Dose Vitamin A Counseling: Side effects reviewed, pt to contact office should one occur. Spironolactone Pregnancy And Lactation Text: This medication can cause feminization of the male fetus and should be avoided during pregnancy. The active metabolite is also found in breast milk. Birth Control Pills Pregnancy And Lactation Text: This medication should be avoided if pregnant and for the first 30 days post-partum. Use Enhanced Medication Counseling?: No Azithromycin Counseling:  I discussed with the patient the risks of azithromycin including but not limited to GI upset, allergic reaction, drug rash, diarrhea, and yeast infections. Benzoyl Peroxide Pregnancy And Lactation Text: This medication is Pregnancy Category C. It is unknown if benzoyl peroxide is excreted in breast milk. Birth Control Pills Counseling: Birth Control Pill Counseling: I discussed with the patient the potential side effects of OCPs including but not limited to increased risk of stroke, heart attack, thrombophlebitis, deep venous thrombosis, hepatic adenomas, breast changes, GI upset, headaches, and depression.  The patient verbalized understanding of the proper use and possible adverse effects of OCPs. All of the patient's questions and concerns were addressed. Topical Clindamycin Counseling: Patient counseled that this medication may cause skin irritation or allergic reactions.  In the event of skin irritation, the patient was advised to reduce the amount of the drug applied or use it less frequently.   The patient verbalized understanding of the proper use and possible adverse effects of clindamycin.  All of the patient's questions and concerns were addressed. Spironolactone Counseling: Patient advised regarding risks of diarrhea, abdominal pain, hyperkalemia, birth defects (for female patients), liver toxicity and renal toxicity. The patient may need blood work to monitor liver and kidney function and potassium levels while on therapy. The patient verbalized understanding of the proper use and possible adverse effects of spironolactone.  All of the patient's questions and concerns were addressed. Doxycycline Pregnancy And Lactation Text: This medication is Pregnancy Category D and not consider safe during pregnancy. It is also excreted in breast milk but is considered safe for shorter treatment courses. Tetracycline Counseling: Patient counseled regarding possible photosensitivity and increased risk for sunburn.  Patient instructed to avoid sunlight, if possible.  When exposed to sunlight, patients should wear protective clothing, sunglasses, and sunscreen.  The patient was instructed to call the office immediately if the following severe adverse effects occur:  hearing changes, easy bruising/bleeding, severe headache, or vision changes.  The patient verbalized understanding of the proper use and possible adverse effects of tetracycline.  All of the patient's questions and concerns were addressed. Patient understands to avoid pregnancy while on therapy due to potential birth defects. Azithromycin Pregnancy And Lactation Text: This medication is considered safe during pregnancy and is also secreted in breast milk. Topical Retinoid Pregnancy And Lactation Text: This medication is Pregnancy Category C. It is unknown if this medication is excreted in breast milk. Erythromycin Pregnancy And Lactation Text: This medication is Pregnancy Category B and is considered safe during pregnancy. It is also excreted in breast milk. High Dose Vitamin A Pregnancy And Lactation Text: High dose vitamin A therapy is contraindicated during pregnancy and breast feeding. Dapsone Counseling: I discussed with the patient the risks of dapsone including but not limited to hemolytic anemia, agranulocytosis, rashes, methemoglobinemia, kidney failure, peripheral neuropathy, headaches, GI upset, and liver toxicity.  Patients who start dapsone require monitoring including baseline LFTs and weekly CBCs for the first month, then every month thereafter.  The patient verbalized understanding of the proper use and possible adverse effects of dapsone.  All of the patient's questions and concerns were addressed. Erythromycin Counseling:  I discussed with the patient the risks of erythromycin including but not limited to GI upset, allergic reaction, drug rash, diarrhea, increase in liver enzymes, and yeast infections. Topical Retinoid counseling:  Patient advised to apply a pea-sized amount only at bedtime and wait 30 minutes after washing their face before applying.  If too drying, patient may add a non-comedogenic moisturizer. The patient verbalized understanding of the proper use and possible adverse effects of retinoids.  All of the patient's questions and concerns were addressed. Topical Clindamycin Pregnancy And Lactation Text: This medication is Pregnancy Category B and is considered safe during pregnancy. It is unknown if it is excreted in breast milk. Tazorac Counseling:  Patient advised that medication is irritating and drying.  Patient may need to apply sparingly and wash off after an hour before eventually leaving it on overnight.  The patient verbalized understanding of the proper use and possible adverse effects of tazorac.  All of the patient's questions and concerns were addressed. Isotretinoin Counseling: Patient should get monthly blood tests, not donate blood, not drive at night if vision affected, not share medication, and not undergo elective surgery for 6 months after tx completed. Side effects reviewed, pt to contact office should one occur. Dapsone Pregnancy And Lactation Text: This medication is Pregnancy Category C and is not considered safe during pregnancy or breast feeding. Topical Sulfur Applications Pregnancy And Lactation Text: This medication is Pregnancy Category C and has an unknown safety profile during pregnancy. It is unknown if this topical medication is excreted in breast milk. Tetracycline Pregnancy And Lactation Text: This medication is Pregnancy Category D and not consider safe during pregnancy. It is also excreted in breast milk. Minocycline Counseling: Patient advised regarding possible photosensitivity and discoloration of the teeth, skin, lips, tongue and gums.  Patient instructed to avoid sunlight, if possible.  When exposed to sunlight, patients should wear protective clothing, sunglasses, and sunscreen.  The patient was instructed to call the office immediately if the following severe adverse effects occur:  hearing changes, easy bruising/bleeding, severe headache, or vision changes.  The patient verbalized understanding of the proper use and possible adverse effects of minocycline.  All of the patient's questions and concerns were addressed. Bactrim Counseling:  I discussed with the patient the risks of sulfa antibiotics including but not limited to GI upset, allergic reaction, drug rash, diarrhea, dizziness, photosensitivity, and yeast infections.  Rarely, more serious reactions can occur including but not limited to aplastic anemia, agranulocytosis, methemoglobinemia, blood dyscrasias, liver or kidney failure, lung infiltrates or desquamative/blistering drug rashes. Topical Sulfur Applications Counseling: Topical Sulfur Counseling: Patient counseled that this medication may cause skin irritation or allergic reactions.  In the event of skin irritation, the patient was advised to reduce the amount of the drug applied or use it less frequently.   The patient verbalized understanding of the proper use and possible adverse effects of topical sulfur application.  All of the patient's questions and concerns were addressed. Isotretinoin Pregnancy And Lactation Text: This medication is Pregnancy Category X and is considered extremely dangerous during pregnancy. It is unknown if it is excreted in breast milk. Tazorac Pregnancy And Lactation Text: This medication is not safe during pregnancy. It is unknown if this medication is excreted in breast milk. Benzoyl Peroxide Counseling: Patient counseled that medicine may cause skin irritation and bleach clothing.  In the event of skin irritation, the patient was advised to reduce the amount of the drug applied or use it less frequently.   The patient verbalized understanding of the proper use and possible adverse effects of benzoyl peroxide.  All of the patient's questions and concerns were addressed. Doxycycline Counseling:  Patient counseled regarding possible photosensitivity and increased risk for sunburn.  Patient instructed to avoid sunlight, if possible.  When exposed to sunlight, patients should wear protective clothing, sunglasses, and sunscreen.  The patient was instructed to call the office immediately if the following severe adverse effects occur:  hearing changes, easy bruising/bleeding, severe headache, or vision changes.  The patient verbalized understanding of the proper use and possible adverse effects of doxycycline.  All of the patient's questions and concerns were addressed. Bactrim Pregnancy And Lactation Text: This medication is Pregnancy Category D and is known to cause fetal risk.  It is also excreted in breast milk.

## 2023-11-15 NOTE — PROGRESS NOTE ADULT - ASSESSMENT
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       NEUROLOGY  #AMS  Multiple embolic strokes   L PCA Infarct   MR head performed w/ new infarct and old infarcts, EEG without seizure events but with high foci potential   - EEG given facial twitching: negative for epileptiform activity (10/31)  - deferring repeat MRI as unlikely to   - continue Lipitor,   - Keppra stopped 11/10 (was on as seizure ppx given multiple infarcts, no seizures on multiple EEG, stopped to ensure it is not contributing to sedation)  - aspirin held 10/11, likely in s/o GIB, Hgb stable not requiring PRBC since 11/4, today ASA 81 mg restarted;   - patient A and O x 0, opens eyes spontaneously, not following commands         CARDIOVASCULAR  # Septic vs vasoplegic shock   Etiology likely septic iso active infection. Possible component of vasoplegic, however no longer with active infection or on sedation. Off IV vasopressors.   Current regimen:   - droxidopa 600mg q8h with PRN 600mg ordered preHD  - midodrine 40mg q6 h; trial of additional 40mg prior to HD  - required levophed with HD 11/13 up to 0.05, and is been off pressors since 11/13, 16:00; however, pt is for HD tomorrow and repeatedly requiring pressors with HD, __ cont monitoring     # HFpEF w/ decompensation   > ECHO w/ EF 59 with severe LVH and diastolic dysfunction   - fluid overloaded, HD to remove fluids     HEENT   # Left ear OE with acute on chronic left-sided otomastoiditis  - noted to have white discharged/residue, increased from prior per patient's daughter; ENT reconsulted, resumed on cipro drops (10/31 - 11/7)  # 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   - resolved    RESPIRATORY  # AHRF second to volume overload   # Copious inline and oral thick tanned secretions   - CKD vs HFpEF w/ hypercarbia 2/2 volume overload requiring intubation, trach, and HD initiation  - Continue on albuterol and chest PT, cont 3 % INH, added IPV,   - Continue volume removal with HD-plan for 2L removal 11/13      #tracheomalacia   - CT CHEST (9/8) with tracheomalacia, BL pleural effusions and continued consolidations     GI  # GIB: last pRBC transfused 11/4 (10/14 before that)   - Continue on PPI BID  - patient with melena 11/13 but H/H stable will hold off on restarting eliquis ,   - -as per nursing staff, stool in black 300 in the rectal system in 24 hrs     # Dysphagia   - NGT- tolerating feeds, at goal- changed to nepro was per nutrition      RENAL  # ESRD, L IJ ilene   - HD MWF TIW with midodrine and droxidopa   - ICU for vasopressor assisted volume removal, cap to 1 pressor and not offering CRRT due to the comorbidities and prognosis   - f/u w/ nephrology: will continue to offer patient 1-pressor assisted HD as agreed prior, as long as she can tolerate HD maxed on 1 pressor, she will be considered iHD candidate  - UF -2.2 L w/ HD (11/13); tolerates w/ norepi gtt, required 0.05 on 11/13, next HD tomorrow   - IR was planning permacath 11/14; but procedure was cancelled due to pt in MICU, on vasopressors, and has leucocytosis      INFECTIOUS DISEASE  #Septic shock  T max 98.5, WBC - 13>14>15,   #blood cx 9/1: MSSA with hypotension  repeat negative 9/4, 9/8 s/p 4 weeks of vanco/dapto through 10/2  #Colonized with  pseudomonas   # SSA/ESBL E Coli PNA  # L otitis Extena s/p ENT debridement c/b Candida, s/p Meropenem and FLuconazole  # Proteus/ Pseudomonas in sputum, s/p Aztreonam   -recent Bcx 11/2 negative, sputum 11/2 with  pseudomonas- colonized   - repeat MRSA PCR neg  - afebrile now after completing course aztreonam 11/6  - monitoring off abx     # possible malignant otitis externa   # ESBL ECOLI and MSSA VAP c/b MSSA bacteremia   - hx of MSSA bacteremia, ESBL E. Coli sputum Cx, BAL w/ MSSA  - treated with abx   - eosinophilia workup: JORGE, ANCAs negative    HEME  # Anemia second to GIB vs renal disease   - multiple transfusions, last done 11/4  - Eliquis held 2/2 pt having melena, and now pt with black stools  -- Today restarted ASA 81 mg, restart DVT prophylaxis tomorrow, and Eliquis the day after if no bleeding and counts stable     #Allergic transfusion reaction: per R[facility] documentation, developed erythematous rash across chest shortly after starting transfusion, blood bank consulted and diagnosed mild allergic rxn  - premedicate w/ benadryl and famotidine prior to future transfusions  - no issue w/ pRBC transfusion 11/4    VASCULAR   # LLE POP DVT   - on Eliquis (held as of 11/11 PM for procedure and also patient having melena)   - SCDs    # HD access  - [facility] (9/27 - 10/21)  - [facility] shiley replaced for HD (10/24 - ), >> L IJ shiley will be removed tomorrow after HD and new line will be placed when pt need next HD session   - Permacath planned today, but cancelled 2/2 pt in MICU, requiring pressors, and leucocytosis     ONC   # Hx of Breast CA   - Patient dx in 2018 and s/p BL mastectomy (radical on right) and chemo and RT.     ENDOCRINE  # IDDM2   - Continued on NPH with ISS, however noted with hypoglycemic episodes and NPH dc'ed.    - Finger sticks trending up off NPH.   - Continue on NPH 6U with moderate ISS.   -- >98>98__  NPH was held 2/2 pt was NPO>> restart TF and continue monitoring   - Adjust as needed      ETHICS/ GOC    - Attempted palliative discussion however family not interested and wishes for FULL CODE  - Family continues to want everything done despite inability to tolerate HD on multiple oral pressor  - Levo capped at 0.3 during HD   --Today daughter Rema at the bedside, daughter Pretti on the phone, and spouse on the phone were updated about pt's condition, all questions were answered    74 YO F with PMHx of IDDM2, HTN, Diabetic Nephropathic CKD, HFpEF, Breast Cancer s/p BL mastectomy, chemotherapy and radiation (2018), Respiratory and Cardiac Arrest (2018), 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       NEUROLOGY  #AMS  # Multiple embolic strokes   # L PCA Infarct   MR head performed w/ new infarct and old infarcts, EEG without seizure events but with high foci potential   - EEG given facial twitching: negative for epileptiform activity (10/31)  - deferring repeat MRI as unlikely to   - continue Lipitor,   - Keppra stopped 11/10 (was on as seizure ppx given multiple infarcts, no seizures on multiple EEG, stopped to ensure it is not contributing to sedation), the same mental status, -- opens eyes spontaneously, and shakes her head intermittently, not following commands    - aspirin held 10/11, likely in s/o GIB, Hgb stable not requiring PRBC since 11/4, ASA 81 mg restarted on 11/14;       CARDIOVASCULAR  # Septic vs vasoplegic shock   Etiology likely septic iso active infection. Possible component of vasoplegic, however no longer with active infection or on sedation. Off IV vasopressors.   Current regimen:   - droxidopa 600mg q8h with PRN 600mg ordered preHD  - midodrine 40mg q6 h; trial of additional 40mg prior to HD  - required levophed with HD 11/13 up to 0.05, and is been off pressors since 11/13, 16:00; today with HD requiring pressors __ Levophed 0.14, for fluid removal     # HFpEF w/ decompensation   > ECHO w/ EF 59 with severe LVH and diastolic dysfunction   - fluid overloaded, + 26 L, HD to remove fluids     HEENT   # Left ear OE with acute on chronic left-sided otomastoiditis, s/p Abx (see in ID)   - noted to have white discharged/residue, increased from prior per patient's daughter; ENT reconsulted, resumed on cipro drops (10/31 - 11/7),   # 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   - resolved    RESPIRATORY  # AHRF second to volume overload   # Copious inline and oral thick tanned secretions   - CKD vs HFpEF w/ hypercarbia 2/2 volume overload requiring intubation, trach, and HD initiation  - Continue on albuterol and chest PT, cont 3 % INH, added IPV,   - Continue volume removal with HD     #tracheomalacia   - CT CHEST (9/8) with tracheomalacia, BL pleural effusions and persistent consolidations     GI  # GIB: last pRBC transfused 11/4 (10/14 before that)   - Continue on PPI BID  - patient had melena before 11/13 but H/H stable will hold off on restarting Eliquis, now with dark brown/black stool  - -as per nursing staff, stool is black 300 in the rectal system in 24 hrs     # Dysphagia   - NGT- tolerating feeds, at goal- changed to nepro was per nutrition    RENAL  # ESRD, L ARTHUR odom   - HD MWF TIW with midodrine and droxidopa   - ICU for vasopressor assisted volume removal, cap to 1 pressor and not offering CRRT due to the comorbidities and prognosis   - f/u w/ nephrology: will continue to offer patient 1-pressor assisted HD as agreed prior, as long as she can tolerate HD maxed on 1 pressor, Levo to 0.3; she will be considered iHD candidate  - UF -2.2 L w/ HD (11/13); - required levophed with HD 11/13 up to 0.05, and is been off pressors since 11/13, 16:00; today with HD requiring pressors __ Levophed 0.14, for fluid removal. and down to 0.08, 2.5 L of fluids removed  - IR was planning PermCath 11/14; but procedure was cancelled due to pt in MICU, on vasopressors, and has leucocytosis      INFECTIOUS DISEASE  #Septic shock  T max 98.2, WBC - 13>14>15>16>18,   #blood cx 9/1: MSSA with hypotension  repeat negative 9/4, 9/8 s/p 4 weeks of vanco/dapto through 10/2  #Colonized with  pseudomonas   # SSA/ESBL E Coli PNA  # L otitis Extena s/p ENT debridement c/b Candida, s/p Meropenem and FLuconazole  # Proteus/ Pseudomonas in sputum, s/p Aztreonam   -recent Bcx 11/2 negative, sputum 11/2 with  pseudomonas- colonized   - repeat MRSA PCR neg  - afebrile now after completing course aztreonam 11/6  - monitoring off abx     # possible malignant otitis externa   # ESBL ECOLI and MSSA VAP c/b MSSA bacteremia   - hx of MSSA bacteremia, ESBL E. Coli sputum Cx, BAL w/ MSSA  - treated with abx   - eosinophilia workup: JORGE, ANCAs negative    HEME  # Anemia second to GIB vs renal disease   - multiple transfusions, last done 11/4  - Eliquis held 2/2 pt having melena, and now pt with black stools  -- Today restarted ASA 81 mg, restart DVT prophylaxis tomorrow, and Eliquis the day after if no bleeding and counts stable     #Allergic transfusion reaction: per [facility] documentation, developed erythematous rash across chest shortly after starting transfusion, blood bank consulted and diagnosed mild allergic rxn  - premedicate w/ benadryl and famotidine prior to future transfusions  - no issue w/ pRBC transfusion 11/4    VASCULAR   # LLE POP DVT   - on Eliquis (held as of 11/11 PM for procedure and also patient having melena)   - SCDs    # HD access  - [facility] (9/27 - 10/21)  - [facility] replaced for HD (10/24 - ), >> L IJ shiley will be removed tomorrow after HD and new line will be placed when pt need next HD session   - Permacath planned today, but cancelled 2/2 pt in MICU, requiring pressors, and leucocytosis     ONC   # Hx of Breast CA   - Patient dx in 2018 and s/p B/L mastectomy (radical on right) and chemo and RT.     ENDOCRINE  # IDDM2   - Continued on NPH with ISS, however noted with hypoglycemic episodes and NPH dc'ed and restarted   - Continue on NPH 6U with moderate ISS.   -- >98>98__  NPH was held 2/2 pt was NPO>> restart TF and continue monitoring   - Adjust as needed      ETHICS/ GOC    - Attempted palliative discussion however family not interested and wishes for FULL CODE  - Family continues to want everything done despite inability to tolerate HD on multiple oral pressor  - Levo capped at 0.3 during HD   --Today daughter Rema at the bedside, daughter Pretti on the phone, and spouse on the phone were updated about pt's condition, all questions were answered    74 YO F with PMHx of IDDM2, HTN, Diabetic Nephropathic CKD, HFpEF, Breast Cancer s/p BL mastectomy, chemotherapy and radiation (2018), Respiratory and Cardiac Arrest (2018), 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       NEUROLOGY  #AMS  # Multiple embolic strokes   # L PCA Infarct   MR head performed w/ new infarct and old infarcts, EEG without seizure events but with high foci potential   - EEG given facial twitching: negative for epileptiform activity (10/31)  - deferring repeat MRI as unlikely to   - continue Lipitor,   - Keppra stopped 11/10 (was on as seizure ppx given multiple infarcts, no seizures on multiple EEG, stopped to ensure it is not contributing to sedation), the same mental status, -- opens eyes spontaneously, and shakes her head intermittently, not following commands    - aspirin held 10/11, likely in s/o GIB, Hgb stable not requiring PRBC since 11/4, ASA 81 mg restarted on 11/14;       CARDIOVASCULAR  # Septic vs vasoplegic shock   Etiology likely septic iso active infection. Possible component of vasoplegic, however no longer with active infection or on sedation. Off IV vasopressors.   Current regimen:   - droxidopa 600mg q8h with PRN 600mg ordered preHD  - midodrine 40mg q6 h; trial of additional 40mg prior to HD  - required levophed with HD 11/13 up to 0.05, and is been off pressors since 11/13, 16:00; today with HD requiring pressors __ Levophed 0.14, for fluid removal     # HFpEF w/ decompensation   > ECHO w/ EF 59 with severe LVH and diastolic dysfunction   - fluid overloaded, + 26 L, HD to remove fluids     HEENT   # Left ear OE with acute on chronic left-sided otomastoiditis, s/p Abx (see in ID)   - noted to have white discharged/residue, increased from prior per patient's daughter; ENT reconsulted, resumed on cipro drops (10/31 - 11/7),   # 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   - resolved    RESPIRATORY  # AHRF second to volume overload   # Copious inline and oral thick tanned secretions   - CKD vs HFpEF w/ hypercarbia 2/2 volume overload requiring intubation, trach, and HD initiation  - Continue on albuterol and chest PT, cont 3 % INH, added IPV,   - Continue volume removal with HD     #tracheomalacia   - CT CHEST (9/8) with tracheomalacia, BL pleural effusions and persistent consolidations     GI  # GIB: last pRBC transfused 11/4 (10/14 before that)   - Continue on PPI BID  - patient had melena before 11/13 but H/H stable will hold off on restarting Eliquis, now with dark brown/black stool  - -as per nursing staff, stool is black 300 in the rectal system in 24 hrs     # Dysphagia   - NGT- tolerating feeds, at goal- changed to nepro was per nutrition    RENAL  # ESRD, L ARTHUR odom   - HD MWF TIW with midodrine and droxidopa   - ICU for vasopressor assisted volume removal, cap to 1 pressor and not offering CRRT due to the comorbidities and prognosis   - f/u w/ nephrology: will continue to offer patient 1-pressor assisted HD as agreed prior, as long as she can tolerate HD maxed on 1 pressor, Levo to 0.3; she will be considered iHD candidate  - UF -2.2 L w/ HD (11/13); - required levophed with HD 11/13 up to 0.05, and is been off pressors since 11/13, 16:00; today with HD requiring pressors __ Levophed 0.14, for fluid removal. and down to 0.08, 2.5 L of fluids removed  - IR was planning PermCath 11/14; but procedure was cancelled due to pt in MICU, on vasopressors, and has leucocytosis      INFECTIOUS DISEASE  #Septic shock  T max 98.2, WBC - 13>14>15>16>18, check LA with am labs  #blood cx 9/1: MSSA with hypotension  repeat negative 9/4, 9/8 s/p 4 weeks of vanco/dapto through 10/2  #Colonized with  pseudomonas   # MSSA/ESBL E Coli PNA  # L otitis Externa s/p ENT debridement c/b Candida, s/p Meropenem and Fluconazole  # Proteus/ Pseudomonas in sputum, s/p Aztreonam   -recent Bcx 11/2 negative, sputum 11/2 with  pseudomonas- colonized   - repeat MRSA PCR neg  - afebrile now after completing course aztreonam 11/6  - monitoring off abx     # possible malignant otitis externa   # ESBL ECOLI and MSSA VAP c/b MSSA bacteremia   - hx of MSSA bacteremia, ESBL E. Coli sputum Cx, BAL w/ MSSA  - treated with abx   - eosinophilia workup: JORGE, ANCAs negative    HEME  # Anemia second to GIB vs renal disease   - multiple transfusions, last done 11/4  - Eliquis held 2/2 pt having melena, and now pt with black stools/dark brown  -- 11/14 -- restarted ASA 81 mg, restarted Heparin SQ prophylaxis today, and Eliquis tomorrow if no bleeding and counts stable     #Allergic transfusion reaction: per [facility] documentation, developed erythematous rash across chest shortly after starting transfusion, blood bank consulted and diagnosed mild allergic rxn  - premedicate w/ benadryl and famotidine prior to future transfusions  - no issue w/ pRBC transfusion 11/4    VASCULAR   # LLE POP DVT   -- on Eliquis (held as of 11/11 PM for procedure and also patient having melena), stools are black/dark brown  -- restarted Heparin SQ prophylaxis today, and Eliquis tomorrow if no bleeding and counts stable   - SCDs,     # HD access  - [facility] (9/27 - 10/21)  - [facility] sh[facility] replaced for HD (10/24 - ), >> L IJ shiley will be removed today after HD and new line will be placed when pt need next HD session, will place trily Shiley line tomorrow  - Permacath was planned on 11/14, but cancelled 2/2 pt in MICU, requiring pressors, and leucocytosis     ONC   # Hx of Breast CA   - Patient dx in 2018 and s/p B/L mastectomy (radical on right) and chemo and RT.     ENDOCRINE  # IDDM2   - Continued on NPH with ISS, however noted with hypoglycemic episodes and NPH dc'ed and restarted   - -- >98>98__   Continue on NPH 6U with moderate ISS.   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       NEUROLOGY  #AMS  # Multiple embolic strokes   # L PCA Infarct   MR head performed w/ new infarct and old infarcts, EEG without seizure events but with high foci potential   - EEG given facial twitching: negative for epileptiform activity (10/31)  - deferring repeat MRI as unlikely to   - continue Lipitor,   - Keppra stopped 11/10 (was on as seizure ppx given multiple infarcts, no seizures on multiple EEG, stopped to ensure it is not contributing to sedation), the same mental status, -- opens eyes spontaneously, and shakes her head intermittently, not following commands    - aspirin held 10/11, likely in s/o GIB, Hgb stable not requiring PRBC since 11/4, ASA 81 mg restarted on 11/14;       CARDIOVASCULAR  # Septic vs vasoplegic shock   Etiology likely septic iso active infection. Possible component of vasoplegic, however no longer with active infection or on sedation. Off IV vasopressors.   Current regimen:   - droxidopa 600mg q8h with PRN 600mg ordered preHD  - midodrine 40mg q6 h; trial of additional 40mg prior to HD  - required levophed with HD 11/13 up to 0.05, and is been off pressors since 11/13, 16:00; today with HD requiring pressors __ Levophed 0.14, for fluid removal     # HFpEF w/ decompensation   > ECHO w/ EF 59 with severe LVH and diastolic dysfunction   - fluid overloaded, + 26 L, HD to remove fluids     HEENT   # Left ear OE with acute on chronic left-sided otomastoiditis, s/p Abx (see in ID)   - noted to have white discharged/residue, increased from prior per patient's daughter; ENT reconsulted, resumed on cipro drops (10/31 - 11/7),   # 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   - resolved    RESPIRATORY  # AHRF second to volume overload   # Copious inline and oral thick tanned secretions   - CKD vs HFpEF w/ hypercarbia 2/2 volume overload requiring intubation, trach, and HD initiation  - Continue on albuterol and chest PT, cont 3 % INH, added IPV,   - Continue volume removal with HD     #tracheomalacia   - CT CHEST (9/8) with tracheomalacia, BL pleural effusions and persistent consolidations     GI  # GIB: last pRBC transfused 11/4 (10/14 before that)   - Continue on PPI BID  - patient had melena before 11/13 but H/H stable will hold off on restarting Eliquis, now with dark brown/black stool  - -as per nursing staff, stool is black 300 in the rectal system in 24 hrs     # Dysphagia   - NGT- tolerating feeds, at goal- changed to nepro was per nutrition    RENAL  # ESRD, L ARTHUR odom   - HD MWF TIW with midodrine and droxidopa   - ICU for vasopressor assisted volume removal, cap to 1 pressor and not offering CRRT due to the comorbidities and prognosis   - f/u w/ nephrology: will continue to offer patient 1-pressor assisted HD as agreed prior, as long as she can tolerate HD maxed on 1 pressor, Levo to 0.3; she will be considered iHD candidate  - UF -2.2 L w/ HD (11/13); - required levophed with HD 11/13 up to 0.05, and is been off pressors since 11/13, 16:00; today with HD requiring pressors __ Levophed 0.14, for fluid removal. and down to 0.08, 2.5 L of fluids removed  - IR was planning PermCath 11/14; but procedure was cancelled due to pt in MICU, on vasopressors, and has leucocytosis      INFECTIOUS DISEASE  #Septic shock  T max 98.2, WBC - 13>14>15>16>18, check LA with am labs  #blood cx 9/1: MSSA with hypotension  repeat negative 9/4, 9/8 s/p 4 weeks of vanco/dapto through 10/2  #Colonized with  pseudomonas   # MSSA/ESBL E Coli PNA  # L otitis Externa s/p ENT debridement c/b Candida, s/p Meropenem and Fluconazole  # Proteus/ Pseudomonas in sputum, s/p Aztreonam   -recent Bcx 11/2 negative, sputum 11/2 with  pseudomonas- colonized   - repeat MRSA PCR neg  - afebrile now after completing course aztreonam 11/6  - monitoring off abx     # possible malignant otitis externa   # ESBL ECOLI and MSSA VAP c/b MSSA bacteremia   - hx of MSSA bacteremia, ESBL E. Coli sputum Cx, BAL w/ MSSA  - treated with abx   - eosinophilia workup: JORGE, ANCAs negative    HEME  # Anemia second to GIB vs renal disease   - multiple transfusions, last done 11/4  - Eliquis held 2/2 pt having melena, and now pt with black stools/dark brown  -- 11/14 -- restarted ASA 81 mg, restarted Heparin SQ prophylaxis today, and Eliquis tomorrow if no bleeding and counts stable     #Allergic transfusion reaction: per [facility] documentation, developed erythematous rash across chest shortly after starting transfusion, blood bank consulted and diagnosed mild allergic rxn  - premedicate w/ benadryl and famotidine prior to future transfusions  - no issue w/ pRBC transfusion 11/4    VASCULAR   # LLE POP DVT   -- on Eliquis (held as of 11/11 PM for procedure and also patient having melena), stools are black/dark brown  -- restarted Heparin SQ prophylaxis today, and Eliquis tomorrow if no bleeding and counts stable   - SCDs,     # HD access  - [facility] SHIRENZO (9/27 - 10/21)  - [facility] shiley replaced for HD (10/24 -11/15 ), >> L IJ shiley was removed today after HD and new line will be placed when pt need next HD session, will need Trialysis Shiley line, which is at the bedside and consent obtained from the family   - Permacath was planned on 11/14, but cancelled 2/2 pt in MICU, requiring pressors, and + leucocytosis     ONC   # Hx of Breast CA   - Patient dx in 2018 and s/p B/L mastectomy (radical on right) and chemo and RT.     ENDOCRINE  # IDDM2   - Continued on NPH with ISS, however noted with hypoglycemic episodes and NPH dc'ed and restarted   - -- >98>98__   Continue on NPH 6U with moderate ISS.   - Adjust as needed      ETHICS/ GOC    - Attempted palliative discussion however family not interested and wishes for FULL CODE  - Family continues to want everything done despite inability to tolerate HD on multiple oral pressor  - Levo capped at 0.3 during HD   --Today daughter Rema at the bedside, daughter Pretti on the phone, and spouse on the phone were updated about pt's condition, all questions were answered

## 2023-11-15 NOTE — PROGRESS NOTE ADULT - ASSESSMENT
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 S/p HD this morning    (2)CV- tenuous hemodynamics - intermittently requiring pressor gtt/on Midodrine 40q6h    (3)Pulm- trach/vent-dependent    (4)Anemia- on Epogen with HD    (5)GOC - s/p repeated discussions with family regarding GOC - family persisting with interest in aggressive measures.     RECOMMEND:  (1)HD today (completed)   (2)PEG placement?  (3)Eventual LTCF with vent/HD  (4)Continued discussions with family regarding GOC    Nilda Espinoza DNP  [facility]  [phone]    IMPRESSION: 73F w/ HTN, DM2, CVA, breast CA-bilateral mastectomy, recurrent aspiration pneumonia/respiratory failure, and CKD, 8/11/23 p/w acute hypercapnic respiratory failure; c/b RUSS    (1)Renal - RUSS on CKD4 ==>newly ESRD-HD as of this admission. S/p HD this morning    (2)CV- tenuous hemodynamics - intermittently requiring pressor gtt/on Midodrine 40q6h    (3)Pulm- trach/vent-dependent    (4)Anemia- on Epogen with HD    (5)GOC - s/p repeated discussions with family regarding GOC - family persisting with interest in aggressive measures.     RECOMMEND:  (1)HD today (completed)   (2)PEG placement?  (3)Eventual LTCF with vent/HD  (4)Continued discussions with family regarding GOC    Nilda Espinoza DNP  [facility]  [phone]       RENAL ATTENDING NOTE  Patient seen and examined with NP. Agree with assessment and plan as above.    Jimmy Colon MD  [facility]  [phone]

## 2023-11-15 NOTE — PROGRESS NOTE ADULT - SUBJECTIVE AND OBJECTIVE BOX
Patient is a 73y old  Female who presents with a chief complaint of Respiratory distress (15 Nov 2023 14:00)      SUBJECTIVE / OVERNIGHT EVENTS: ptn is vent dependent, trache to vent, on pressor assisted HD, afebrile. GOC had been discussed w family by ICU team . ptn remains full code, family hopeful for a recovery    MEDICATIONS  (STANDING):  albuterol/ipratropium for Nebulization 3 milliLiter(s) Nebulizer every 6 hours  artificial tears (preservative free) Ophthalmic Solution 1 Drop(s) Both EYES every 4 hours  aspirin  chewable 81 milliGRAM(s) Enteral Tube daily  atorvastatin 10 milliGRAM(s) Oral at bedtime  calamine/zinc oxide Lotion 1 Application(s) Topical daily  chlorhexidine 0.12% Liquid 15 milliLiter(s) Oral Mucosa every 12 hours  chlorhexidine 2% Cloths 1 Application(s) Topical daily  dextrose 5%. 1000 milliLiter(s) (100 mL/Hr) IV Continuous <Continuous>  dextrose 5%. 1000 milliLiter(s) (50 mL/Hr) IV Continuous <Continuous>  dextrose 50% Injectable 12.5 Gram(s) IV Push once  dextrose 50% Injectable 25 Gram(s) IV Push once  dextrose 50% Injectable 25 Gram(s) IV Push once  dextrose 50% Injectable 25 Gram(s) IV Push once  droxidopa 600 milliGRAM(s) Oral every 8 hours  epoetin odalis (EPOGEN) Injectable 17067 Unit(s) IV Push <User Schedule>  ferrous    sulfate Liquid 300 milliGRAM(s) Oral daily  glucagon  Injectable 1 milliGRAM(s) IntraMuscular once  heparin   Injectable 5000 Unit(s) SubCutaneous every 8 hours  insulin lispro (ADMELOG) corrective regimen sliding scale   SubCutaneous every 6 hours  insulin NPH human recombinant 6 Unit(s) SubCutaneous every 6 hours  midodrine. 40 milliGRAM(s) Oral <User Schedule>  norepinephrine Infusion 0.05 MICROgram(s)/kG/Min (6.23 mL/Hr) IV Continuous <Continuous>  pantoprazole  Injectable 40 milliGRAM(s) IV Push two times a day  petrolatum Ophthalmic Ointment 1 Application(s) Both EYES four times a day  sodium chloride 1 Gram(s) Oral two times a day  sodium chloride 3%  Inhalation 4 milliLiter(s) Inhalation every 6 hours    MEDICATIONS  (PRN):  acetaminophen   Oral Liquid .. 650 milliGRAM(s) Oral every 6 hours PRN Temp greater or equal to 38C (100.4F), Mild Pain (1 - 3)  dextrose Oral Gel 15 Gram(s) Oral once PRN Blood Glucose LESS THAN 70 milliGRAM(s)/deciliter  diphenhydrAMINE Elixir 50 milliGRAM(s) Oral once PRN Rash and/or Itching  droxidopa 600 milliGRAM(s) Oral <User Schedule> PRN prior to hemodialysis  midodrine. 40 milliGRAM(s) Oral once PRN 1 Hour Pre HD  sodium chloride 0.9% Bolus. 250 milliLiter(s) IV Bolus every 5 minutes PRN SBP LESS THAN or EQUAL to 90 mmHg  sodium chloride 0.9% lock flush 10 milliLiter(s) IV Push every 1 hour PRN Pre/post blood products, medications, blood draw, and to maintain line patency      Vital Signs Last 24 Hrs  T(F): 99.2 (11-15-23 @ 12:00), Max: 99.3 (11-15-23 @ 06:50)  HR: 118 (11-15-23 @ 14:00) (89 - 128)  BP: --  RR: 24 (11-15-23 @ 14:00) (9 - 24)  SpO2: 99% (11-15-23 @ 14:00) (94% - 100%)  Telemetry:   CAPILLARY BLOOD GLUCOSE      POCT Blood Glucose.: 143 mg/dL (15 Nov 2023 11:48)  POCT Blood Glucose.: 165 mg/dL (15 Nov 2023 05:59)  POCT Blood Glucose.: 129 mg/dL (14 Nov 2023 23:24)  POCT Blood Glucose.: 110 mg/dL (14 Nov 2023 17:32)    I&O's Summary    14 Nov 2023 07:01  -  15 Nov 2023 07:00  --------------------------------------------------------  IN: 480 mL / OUT: 400 mL / NET: 80 mL    15 Nov 2023 07:01  -  15 Nov 2023 15:26  --------------------------------------------------------  IN: 769.7 mL / OUT: 3000 mL / NET: -2230.3 mL        PHYSICAL EXAM:  GENERAL: NAD, well-developed  HEAD:  Atraumatic, Normocephalic  EYES: EOMI, PERRLA, conjunctiva and sclera clear  NECK: Supple, No JVD  CHEST/LUNG: Clear to auscultation bilaterally; No wheeze  HEART: Regular rate and rhythm; No murmurs, rubs, or gallops  ABDOMEN: Soft, Nontender, Nondistended; Bowel sounds present  EXTREMITIES:  2+ Peripheral Pulses, No clubbing, cyanosis, or edema  PSYCH: AAOx3  NEUROLOGY: non-focal  SKIN: No rashes or lesions    LABS:                        9.0    18.02 )-----------( 538      ( 15 Nov 2023 03:05 )             30.7     11-15    136  |  100  |  36<H>  ----------------------------<  147<H>  5.2   |  25  |  1.57<H>    Ca    8.7      15 Nov 2023 03:05  Phos  4.8     11-15  Mg     2.50     11-15    TPro  6.4  /  Alb  1.9<L>  /  TBili  <0.2  /  DBili  x   /  AST  16  /  ALT  17  /  AlkPhos  337<H>  11-15    PT/INR - ( 14 Nov 2023 02:53 )   PT: 12.0 sec;   INR: 1.06 ratio         PTT - ( 14 Nov 2023 02:53 )  PTT:31.6 sec      Urinalysis Basic - ( 15 Nov 2023 03:05 )    Color: x / Appearance: x / SG: x / pH: x  Gluc: 147 mg/dL / Ketone: x  / Bili: x / Urobili: x   Blood: x / Protein: x / Nitrite: x   Leuk Esterase: x / RBC: x / WBC x   Sq Epi: x / Non Sq Epi: x / Bacteria: x        RADIOLOGY & ADDITIONAL TESTS:    Imaging Personally Reviewed:    Consultant(s) Notes Reviewed:      Care Discussed with Consultants/Other Providers:

## 2023-11-16 NOTE — PROGRESS NOTE ADULT - SUBJECTIVE AND OBJECTIVE BOX
CHIEF COMPLAINT:    Interval Events:    HPI:  72 y/o F DM on insulin, HTN, CKD,breast CA, respiratory arrest and cardiac arrest (2018), CVA with residual weakness, aspiration PNA h/o trache, PEG, both removed presents with respiratory distress requiring intubation. Had recent admission d/c 7/22/23 for cough and respiratory distress (no intubation) thought to be i/s/o aspiration PNA s/p cefepime course; developed rash in response. Infectious w/u was negative at this time. Was discharged home, daughter and  at bedside providing history.     Reports that she was initially improving with O2 sats in the high 90s on room air, however, then became more lethargic and not herself (baseline mental status AOx3). Also had worsening shortness of breath while laying down and leg swelling. Contacted cardiologist who started her on bumex 1mg daily. Developed low sugars overnight before admission and was given juice with some food, daughter reports no coughing during episode. At around 4-5 AM developed vomiting and coughing, O2 sats dropped to 80s and EMS was called. Denies fevers/chills, dysuria or urinary frequency, chest pain, palpitations. Uses wheelchair at home however family moves her around frequently.     In ED, was on BiPAP with increased WOB and was intubated. Found to have elevated pro-BNP and CO2 of 111 on VBG. CXR with small right pleural effusion, started on vanc in the ED.  (11 Aug 2023 09:08)      REVIEW OF SYSTEMS:  Constitutional: [ ] negative [ ] fevers [ ] chills [ ] weight loss [ ] weight gain  HEENT: [ ] negative [ ] dry eyes [ ] eye irritation [ ] postnasal drip [ ] nasal congestion  CV: [ ] negative  [ ] chest pain [ ] orthopnea [ ] palpitations [ ] murmur  Resp: [ ] negative [ ] cough [ ] shortness of breath [ ] dyspnea [ ] wheezing [ ] sputum [ ] hemoptysis  GI: [ ] negative [ ] nausea [ ] vomiting [ ] diarrhea [ ] constipation [ ] abd pain [ ] dysphagia   : [ ] negative [ ] dysuria [ ] nocturia [ ] hematuria [ ] increased urinary frequency  Musculoskeletal: [ ] negative [ ] back pain [ ] myalgias [ ] arthralgias [ ] fracture  Skin: [ ] negative [ ] rash [ ] itch  Neurological: [ ] negative [ ] headache [ ] dizziness [ ] syncope [ ] weakness [ ] numbness  Psychiatric: [ ] negative [ ] anxiety [ ] depression  Endocrine: [ ] negative [ ] diabetes [ ] thyroid problem  Hematologic/Lymphatic: [ ] negative [ ] anemia [ ] bleeding problem  Allergic/Immunologic: [ ] negative [ ] itchy eyes [ ] nasal discharge [ ] hives [ ] angioedema  [ ] All other systems negative  [ ] Unable to assess ROS because ________    OBJECTIVE:  ICU Vital Signs Last 24 Hrs  T(C): 37.2 (16 Nov 2023 04:00), Max: 37.4 (15 Nov 2023 06:50)  T(F): 98.9 (16 Nov 2023 04:00), Max: 99.3 (15 Nov 2023 06:50)  HR: 107 (16 Nov 2023 03:10) (89 - 128)  BP: --  BP(mean): --  ABP: 145/50 (16 Nov 2023 03:00) (86/34 - 150/56)  ABP(mean): 87 (16 Nov 2023 03:00) (53 - 94)  RR: 24 (16 Nov 2023 03:00) (9 - 24)  SpO2: 100% (16 Nov 2023 03:10) (94% - 100%)    O2 Parameters below as of 16 Nov 2023 04:00  Patient On (Oxygen Delivery Method): ventilator    O2 Concentration (%): 40      Mode: AC/ CMV (Assist Control/ Continuous Mandatory Ventilation), RR (machine): 24, FiO2: 40, PEEP: 8, ITime: 0.8, MAP: 19, PC: 35, PIP: 38    11-14 @ 07:01  -  11-15 @ 07:00  --------------------------------------------------------  IN: 480 mL / OUT: 400 mL / NET: 80 mL    11-15 @ 07:01  -  11-16 @ 06:15  --------------------------------------------------------  IN: 1599.7 mL / OUT: 3100 mL / NET: -1500.3 mL      CAPILLARY BLOOD GLUCOSE      POCT Blood Glucose.: 204 mg/dL (16 Nov 2023 05:11)      Physical Exam:   Constitutional: ill appearing, no acute distress   HEENT: + PERRLA, EOMI, no drainage or redness  Neck: supple,  No JVD  Respiratory: Ventilator assisted breath Sounds equal & clear bilaterally to auscultation, no accessory muscle use noted  Cardiovascular: Regular rate, regular rhythm, normal S1, S2; no murmurs or rub  Gastrointestinal: Soft, non-tender, non distended, no hepatosplenomegaly, normal bowel sounds  Extremities: + 2 peripheral edema, no cyanosis, no clubbing   Vascular: Equal and normal pulses: 2+ peripheral pulses throughout  Neurological: sedated/intubated  Psychiatric: calm, normal mood, normal affect  Musculoskeletal: No joint swelling or deformity; no limitation of movement  Skin: warm, dry, well perfused, no rashes    HOSPITAL MEDICATIONS:  Standing Meds:  albuterol/ipratropium for Nebulization 3 milliLiter(s) Nebulizer every 6 hours  artificial tears (preservative free) Ophthalmic Solution 1 Drop(s) Both EYES every 4 hours  aspirin  chewable 81 milliGRAM(s) Enteral Tube daily  atorvastatin 10 milliGRAM(s) Oral at bedtime  calamine/zinc oxide Lotion 1 Application(s) Topical daily  chlorhexidine 0.12% Liquid 15 milliLiter(s) Oral Mucosa every 12 hours  chlorhexidine 2% Cloths 1 Application(s) Topical daily  dextrose 5%. 1000 milliLiter(s) IV Continuous <Continuous>  dextrose 5%. 1000 milliLiter(s) IV Continuous <Continuous>  dextrose 50% Injectable 25 Gram(s) IV Push once  dextrose 50% Injectable 12.5 Gram(s) IV Push once  dextrose 50% Injectable 25 Gram(s) IV Push once  dextrose 50% Injectable 25 Gram(s) IV Push once  droxidopa 600 milliGRAM(s) Oral every 8 hours  epoetin odalis (EPOGEN) Injectable 64988 Unit(s) IV Push <User Schedule>  ferrous    sulfate Liquid 300 milliGRAM(s) Oral daily  glucagon  Injectable 1 milliGRAM(s) IntraMuscular once  heparin   Injectable 5000 Unit(s) SubCutaneous every 8 hours  insulin lispro (ADMELOG) corrective regimen sliding scale   SubCutaneous every 6 hours  insulin NPH human recombinant 6 Unit(s) SubCutaneous every 6 hours  midodrine. 40 milliGRAM(s) Oral <User Schedule>  norepinephrine Infusion 0.05 MICROgram(s)/kG/Min IV Continuous <Continuous>  pantoprazole  Injectable 40 milliGRAM(s) IV Push two times a day  petrolatum Ophthalmic Ointment 1 Application(s) Both EYES four times a day  sodium chloride 1 Gram(s) Oral two times a day  sodium chloride 3%  Inhalation 4 milliLiter(s) Inhalation every 6 hours      PRN Meds:  acetaminophen   Oral Liquid .. 650 milliGRAM(s) Oral every 6 hours PRN  dextrose Oral Gel 15 Gram(s) Oral once PRN  diphenhydrAMINE Elixir 50 milliGRAM(s) Oral once PRN  droxidopa 600 milliGRAM(s) Oral <User Schedule> PRN  midodrine. 40 milliGRAM(s) Oral once PRN  sodium chloride 0.9% Bolus. 250 milliLiter(s) IV Bolus every 5 minutes PRN  sodium chloride 0.9% lock flush 10 milliLiter(s) IV Push every 1 hour PRN      LABS:                        8.8    15.97 )-----------( 463      ( 16 Nov 2023 02:40 )             29.6     Hgb Trend: 8.8<--, 9.0<--, 9.1<--, 9.3<--, 8.9<--  11-16    135  |  100  |  25<H>  ----------------------------<  193<H>  4.1   |  27  |  1.31<H>    Ca    8.7      16 Nov 2023 02:40  Phos  3.6     11-16  Mg     2.30     11-16    TPro  6.2  /  Alb  1.9<L>  /  TBili  <0.2  /  DBili  x   /  AST  17  /  ALT  16  /  AlkPhos  360<H>  11-16    Creatinine Trend: 1.31<--, 1.57<--, 1.33<--, 1.72<--, 1.54<--, 1.18<--    Urinalysis Basic - ( 16 Nov 2023 02:40 )    Color: x / Appearance: x / SG: x / pH: x  Gluc: 193 mg/dL / Ketone: x  / Bili: x / Urobili: x   Blood: x / Protein: x / Nitrite: x   Leuk Esterase: x / RBC: x / WBC x   Sq Epi: x / Non Sq Epi: x / Bacteria: x            MICROBIOLOGY:     RADIOLOGY:  [ ] Reviewed and interpreted by me         CHIEF COMPLAINT: sob    Interval Events:    HPI:  72 y/o F DM on insulin, HTN, CKD,breast CA, respiratory arrest and cardiac arrest (2018), CVA with residual weakness, aspiration PNA h/o trache, PEG, both removed presents with respiratory distress requiring intubation. Had recent admission d/c 7/22/23 for cough and respiratory distress (no intubation) thought to be i/s/o aspiration PNA s/p cefepime course; developed rash in response. Infectious w/u was negative at this time. Was discharged home, daughter and  at bedside providing history.     Reports that she was initially improving with O2 sats in the high 90s on room air, however, then became more lethargic and not herself (baseline mental status AOx3). Also had worsening shortness of breath while laying down and leg swelling. Contacted cardiologist who started her on bumex 1mg daily. Developed low sugars overnight before admission and was given juice with some food, daughter reports no coughing during episode. At around 4-5 AM developed vomiting and coughing, O2 sats dropped to 80s and EMS was called. Denies fevers/chills, dysuria or urinary frequency, chest pain, palpitations. Uses wheelchair at home however family moves her around frequently.     In ED, was on BiPAP with increased WOB and was intubated. Found to have elevated pro-BNP and CO2 of 111 on VBG. CXR with small right pleural effusion, started on vanc in the ED.  (11 Aug 2023 09:08)      REVIEW OF SYSTEMS:  Constitutional: [ ] negative [ ] fevers [ ] chills [ ] weight loss [ ] weight gain  HEENT: [ ] negative [ ] dry eyes [ ] eye irritation [ ] postnasal drip [ ] nasal congestion  CV: [ ] negative  [ ] chest pain [ ] orthopnea [ ] palpitations [ ] murmur  Resp: [ ] negative [ ] cough [ ] shortness of breath [ ] dyspnea [ ] wheezing [ ] sputum [ ] hemoptysis  GI: [ ] negative [ ] nausea [ ] vomiting [ ] diarrhea [ ] constipation [ ] abd pain [ ] dysphagia   : [ ] negative [ ] dysuria [ ] nocturia [ ] hematuria [ ] increased urinary frequency  Musculoskeletal: [ ] negative [ ] back pain [ ] myalgias [ ] arthralgias [ ] fracture  Skin: [ ] negative [ ] rash [ ] itch  Neurological: [ ] negative [ ] headache [ ] dizziness [ ] syncope [ ] weakness [ ] numbness  Psychiatric: [ ] negative [ ] anxiety [ ] depression  Endocrine: [ ] negative [ ] diabetes [ ] thyroid problem  Hematologic/Lymphatic: [ ] negative [ ] anemia [ ] bleeding problem  Allergic/Immunologic: [ ] negative [ ] itchy eyes [ ] nasal discharge [ ] hives [ ] angioedema  [ ] All other systems negative  [ ] Unable to assess ROS because ________    OBJECTIVE:  ICU Vital Signs Last 24 Hrs  T(C): 37.2 (16 Nov 2023 04:00), Max: 37.4 (15 Nov 2023 06:50)  T(F): 98.9 (16 Nov 2023 04:00), Max: 99.3 (15 Nov 2023 06:50)  HR: 107 (16 Nov 2023 03:10) (89 - 128)  BP: --  BP(mean): --  ABP: 145/50 (16 Nov 2023 03:00) (86/34 - 150/56)  ABP(mean): 87 (16 Nov 2023 03:00) (53 - 94)  RR: 24 (16 Nov 2023 03:00) (9 - 24)  SpO2: 100% (16 Nov 2023 03:10) (94% - 100%)    O2 Parameters below as of 16 Nov 2023 04:00  Patient On (Oxygen Delivery Method): ventilator    O2 Concentration (%): 40      Mode: AC/ CMV (Assist Control/ Continuous Mandatory Ventilation), RR (machine): 24, FiO2: 40, PEEP: 8, ITime: 0.8, MAP: 19, PC: 35, PIP: 38    11-14 @ 07:01  -  11-15 @ 07:00  --------------------------------------------------------  IN: 480 mL / OUT: 400 mL / NET: 80 mL    11-15 @ 07:01  -  11-16 @ 06:15  --------------------------------------------------------  IN: 1599.7 mL / OUT: 3100 mL / NET: -1500.3 mL      CAPILLARY BLOOD GLUCOSE      POCT Blood Glucose.: 204 mg/dL (16 Nov 2023 05:11)      Physical Exam:   Constitutional: ill appearing, no acute distress   HEENT: + PERRLA, EOMI, no drainage or redness  Neck: supple,  No JVD  Respiratory: Ventilator assisted breath Sounds equal & clear bilaterally to auscultation, no accessory muscle use noted  Cardiovascular: Regular rate, regular rhythm, normal S1, S2; no murmurs or rub  Gastrointestinal: Soft, non-tender, non distended, no hepatosplenomegaly, normal bowel sounds  Extremities: + 2 peripheral edema, no cyanosis, no clubbing   Vascular: Equal and normal pulses: 2+ peripheral pulses throughout  Neurological: sedated/intubated  Psychiatric: calm, normal mood, normal affect  Musculoskeletal: No joint swelling or deformity; no limitation of movement  Skin: warm, dry, well perfused, no rashes    HOSPITAL MEDICATIONS:  Standing Meds:  albuterol/ipratropium for Nebulization 3 milliLiter(s) Nebulizer every 6 hours  artificial tears (preservative free) Ophthalmic Solution 1 Drop(s) Both EYES every 4 hours  aspirin  chewable 81 milliGRAM(s) Enteral Tube daily  atorvastatin 10 milliGRAM(s) Oral at bedtime  calamine/zinc oxide Lotion 1 Application(s) Topical daily  chlorhexidine 0.12% Liquid 15 milliLiter(s) Oral Mucosa every 12 hours  chlorhexidine 2% Cloths 1 Application(s) Topical daily  dextrose 5%. 1000 milliLiter(s) IV Continuous <Continuous>  dextrose 5%. 1000 milliLiter(s) IV Continuous <Continuous>  dextrose 50% Injectable 25 Gram(s) IV Push once  dextrose 50% Injectable 12.5 Gram(s) IV Push once  dextrose 50% Injectable 25 Gram(s) IV Push once  dextrose 50% Injectable 25 Gram(s) IV Push once  droxidopa 600 milliGRAM(s) Oral every 8 hours  epoetin odalis (EPOGEN) Injectable 69489 Unit(s) IV Push <User Schedule>  ferrous    sulfate Liquid 300 milliGRAM(s) Oral daily  glucagon  Injectable 1 milliGRAM(s) IntraMuscular once  heparin   Injectable 5000 Unit(s) SubCutaneous every 8 hours  insulin lispro (ADMELOG) corrective regimen sliding scale   SubCutaneous every 6 hours  insulin NPH human recombinant 6 Unit(s) SubCutaneous every 6 hours  midodrine. 40 milliGRAM(s) Oral <User Schedule>  norepinephrine Infusion 0.05 MICROgram(s)/kG/Min IV Continuous <Continuous>  pantoprazole  Injectable 40 milliGRAM(s) IV Push two times a day  petrolatum Ophthalmic Ointment 1 Application(s) Both EYES four times a day  sodium chloride 1 Gram(s) Oral two times a day  sodium chloride 3%  Inhalation 4 milliLiter(s) Inhalation every 6 hours      PRN Meds:  acetaminophen   Oral Liquid .. 650 milliGRAM(s) Oral every 6 hours PRN  dextrose Oral Gel 15 Gram(s) Oral once PRN  diphenhydrAMINE Elixir 50 milliGRAM(s) Oral once PRN  droxidopa 600 milliGRAM(s) Oral <User Schedule> PRN  midodrine. 40 milliGRAM(s) Oral once PRN  sodium chloride 0.9% Bolus. 250 milliLiter(s) IV Bolus every 5 minutes PRN  sodium chloride 0.9% lock flush 10 milliLiter(s) IV Push every 1 hour PRN      LABS:                        8.8    15.97 )-----------( 463      ( 16 Nov 2023 02:40 )             29.6     Hgb Trend: 8.8<--, 9.0<--, 9.1<--, 9.3<--, 8.9<--  11-16    135  |  100  |  25<H>  ----------------------------<  193<H>  4.1   |  27  |  1.31<H>    Ca    8.7      16 Nov 2023 02:40  Phos  3.6     11-16  Mg     2.30     11-16    TPro  6.2  /  Alb  1.9<L>  /  TBili  <0.2  /  DBili  x   /  AST  17  /  ALT  16  /  AlkPhos  360<H>  11-16    Creatinine Trend: 1.31<--, 1.57<--, 1.33<--, 1.72<--, 1.54<--, 1.18<--    Urinalysis Basic - ( 16 Nov 2023 02:40 )    Color: x / Appearance: x / SG: x / pH: x  Gluc: 193 mg/dL / Ketone: x  / Bili: x / Urobili: x   Blood: x / Protein: x / Nitrite: x   Leuk Esterase: x / RBC: x / WBC x   Sq Epi: x / Non Sq Epi: x / Bacteria: x            MICROBIOLOGY:     RADIOLOGY:  [ ] Reviewed and interpreted by me         CHIEF COMPLAINT: sob    Interval Events: No significant events reported overnight.      HPI:  72 y/o F DM on insulin, HTN, CKD,breast CA, respiratory arrest and cardiac arrest (2018), CVA with residual weakness, aspiration PNA h/o trache, PEG, both removed presents with respiratory distress requiring intubation. Had recent admission d/c 7/22/23 for cough and respiratory distress (no intubation) thought to be i/s/o aspiration PNA s/p cefepime course; developed rash in response. Infectious w/u was negative at this time. Was discharged home, daughter and  at bedside providing history.     Reports that she was initially improving with O2 sats in the high 90s on room air, however, then became more lethargic and not herself (baseline mental status AOx3). Also had worsening shortness of breath while laying down and leg swelling. Contacted cardiologist who started her on bumex 1mg daily. Developed low sugars overnight before admission and was given juice with some food, daughter reports no coughing during episode. At around 4-5 AM developed vomiting and coughing, O2 sats dropped to 80s and EMS was called. Denies fevers/chills, dysuria or urinary frequency, chest pain, palpitations. Uses wheelchair at home however family moves her around frequently.     In ED, was on BiPAP with increased WOB and was intubated. Found to have elevated pro-BNP and CO2 of 111 on VBG. CXR with small right pleural effusion, started on vanc in the ED.  (11 Aug 2023 09:08)      Subjective: pt unable to provide any history in s/o cognitive impairment       OBJECTIVE:  ICU Vital Signs Last 24 Hrs  T(C): 37.2 (16 Nov 2023 04:00), Max: 37.4 (15 Nov 2023 06:50)  T(F): 98.9 (16 Nov 2023 04:00), Max: 99.3 (15 Nov 2023 06:50)  HR: 107 (16 Nov 2023 03:10) (89 - 128)  BP: --  BP(mean): --  ABP: 145/50 (16 Nov 2023 03:00) (86/34 - 150/56)  ABP(mean): 87 (16 Nov 2023 03:00) (53 - 94)  RR: 24 (16 Nov 2023 03:00) (9 - 24)  SpO2: 100% (16 Nov 2023 03:10) (94% - 100%)    O2 Parameters below as of 16 Nov 2023 04:00  Patient On (Oxygen Delivery Method): ventilator    O2 Concentration (%): 40      Mode: AC/ CMV (Assist Control/ Continuous Mandatory Ventilation), RR (machine): 24, FiO2: 40, PEEP: 8, ITime: 0.8, MAP: 19, PC: 35, PIP: 38    11-14 @ 07:01  -  11-15 @ 07:00  --------------------------------------------------------  IN: 480 mL / OUT: 400 mL / NET: 80 mL    11-15 @ 07:01  -  11-16 @ 06:15  --------------------------------------------------------  IN: 1599.7 mL / OUT: 3100 mL / NET: -1500.3 mL      CAPILLARY BLOOD GLUCOSE      POCT Blood Glucose.: 204 mg/dL (16 Nov 2023 05:11)    Physical Exam:   Constitutional: NAD, well-groomed, well-developed  HEENT: PERRLA, EOMI, no drainage or redness  Neck: supple,  No JVD, Trachea midline  Back: Normal spine flexure, No CVA tenderness, No deformity or limitation of movement  Respiratory: Breath Sounds diminished bilaterally to auscultation, no accessory muscle use noted  Cardiovascular: Regular rate, regular rhythm, normal S1, S2; no murmurs or rub  Gastrointestinal: Soft, non-tender, non distended, no hepatosplenomegaly, normal bowel sounds  Extremities: 3+ peripheral edema, no cyanosis, no clubbing   Vascular: Equal and normal pulses: 2+ peripheral pulses throughout  Neurological: opens eyes spontaneously, sometimes shakes her head, no sensory, motor  deficits, normal reflexes  Psychiatric: calm, normal mood, normal affect  Musculoskeletal: No joint swelling or deformity; no limitation of movement  Skin: warm, dry, well perfused, no rashes          HOSPITAL MEDICATIONS:  Standing Meds:  albuterol/ipratropium for Nebulization 3 milliLiter(s) Nebulizer every 6 hours  artificial tears (preservative free) Ophthalmic Solution 1 Drop(s) Both EYES every 4 hours  aspirin  chewable 81 milliGRAM(s) Enteral Tube daily  atorvastatin 10 milliGRAM(s) Oral at bedtime  calamine/zinc oxide Lotion 1 Application(s) Topical daily  chlorhexidine 0.12% Liquid 15 milliLiter(s) Oral Mucosa every 12 hours  chlorhexidine 2% Cloths 1 Application(s) Topical daily  dextrose 5%. 1000 milliLiter(s) IV Continuous <Continuous>  dextrose 5%. 1000 milliLiter(s) IV Continuous <Continuous>  dextrose 50% Injectable 25 Gram(s) IV Push once  dextrose 50% Injectable 12.5 Gram(s) IV Push once  dextrose 50% Injectable 25 Gram(s) IV Push once  dextrose 50% Injectable 25 Gram(s) IV Push once  droxidopa 600 milliGRAM(s) Oral every 8 hours  epoetin odalis (EPOGEN) Injectable 68703 Unit(s) IV Push <User Schedule>  ferrous    sulfate Liquid 300 milliGRAM(s) Oral daily  glucagon  Injectable 1 milliGRAM(s) IntraMuscular once  heparin   Injectable 5000 Unit(s) SubCutaneous every 8 hours  insulin lispro (ADMELOG) corrective regimen sliding scale   SubCutaneous every 6 hours  insulin NPH human recombinant 6 Unit(s) SubCutaneous every 6 hours  midodrine. 40 milliGRAM(s) Oral <User Schedule>  norepinephrine Infusion 0.05 MICROgram(s)/kG/Min IV Continuous <Continuous>  pantoprazole  Injectable 40 milliGRAM(s) IV Push two times a day  petrolatum Ophthalmic Ointment 1 Application(s) Both EYES four times a day  sodium chloride 1 Gram(s) Oral two times a day  sodium chloride 3%  Inhalation 4 milliLiter(s) Inhalation every 6 hours      PRN Meds:  acetaminophen   Oral Liquid .. 650 milliGRAM(s) Oral every 6 hours PRN  dextrose Oral Gel 15 Gram(s) Oral once PRN  diphenhydrAMINE Elixir 50 milliGRAM(s) Oral once PRN  droxidopa 600 milliGRAM(s) Oral <User Schedule> PRN  midodrine. 40 milliGRAM(s) Oral once PRN  sodium chloride 0.9% Bolus. 250 milliLiter(s) IV Bolus every 5 minutes PRN  sodium chloride 0.9% lock flush 10 milliLiter(s) IV Push every 1 hour PRN      LABS:                        8.8    15.97 )-----------( 463      ( 16 Nov 2023 02:40 )             29.6     Hgb Trend: 8.8<--, 9.0<--, 9.1<--, 9.3<--, 8.9<--  11-16    135  |  100  |  25<H>  ----------------------------<  193<H>  4.1   |  27  |  1.31<H>    Ca    8.7      16 Nov 2023 02:40  Phos  3.6     11-16  Mg     2.30     11-16    TPro  6.2  /  Alb  1.9<L>  /  TBili  <0.2  /  DBili  x   /  AST  17  /  ALT  16  /  AlkPhos  360<H>  11-16    Creatinine Trend: 1.31<--, 1.57<--, 1.33<--, 1.72<--, 1.54<--, 1.18<--    Urinalysis Basic - ( 16 Nov 2023 02:40 )    Color: x / Appearance: x / SG: x / pH: x  Gluc: 193 mg/dL / Ketone: x  / Bili: x / Urobili: x   Blood: x / Protein: x / Nitrite: x   Leuk Esterase: x / RBC: x / WBC x   Sq Epi: x / Non Sq Epi: x / Bacteria: x            MICROBIOLOGY:     RADIOLOGY:  [ ] Reviewed and interpreted by me

## 2023-11-16 NOTE — PROCEDURE NOTE - NSBRONCHINDICATION_GEN_ALL_CORE_FT
Poor ventilation with concern for airway obstruction
High peak and plateau pressures
acute hypoxemic respiratory failure

## 2023-11-16 NOTE — PROGRESS NOTE ADULT - SUBJECTIVE AND OBJECTIVE BOX
Tolerated 2.5L UF tomorrow with HD  Remains on vent, fio2 40%      VITAL:  T(C): , Max: 37.8 (11-16-23 @ 08:00)  T(F): , Max: 100 (11-16-23 @ 08:00)  HR: 109 (11-16-23 @ 15:00)  BP: 145/59  RR: 24 (11-16-23 @ 15:00)  SpO2: 100% (11-16-23 @ 15:00)      PHYSICAL EXAM:  Constitutional: critically ill, trach to vent   HEENT: + NGT, + tracheostomy   Respiratory: coarse BS  Cardiovascular: tachy s1s2  Gastrointestinal: BS+, soft, NT/ND  Extremities: ++ generalized edema  : No Wheeler  Skin: No rashes  Access: [facility]     LABS:                        8.8    15.97 )-----------( 463      ( 16 Nov 2023 02:40 )             29.6     Na(135)/K(4.1)/Cl(100)/HCO3(27)/BUN(25)/Cr(1.31)Glu(193)/Ca(8.7)/Mg(2.30)/PO4(3.6)    11-16 @ 02:40  Na(136)/K(5.2)/Cl(100)/HCO3(25)/BUN(36)/Cr(1.57)Glu(147)/Ca(8.7)/Mg(2.50)/PO4(4.8)    11-15 @ 03:05  Na(136)/K(5.0)/Cl(100)/HCO3(28)/BUN(31)/Cr(1.33)Glu(117)/Ca(8.6)/Mg(2.40)/PO4(4.5)    11-14 @ 02:53      IMPRESSION: 73F w/ HTN, DM2, CVA, breast CA-bilateral mastectomy, recurrent aspiration pneumonia/respiratory failure, and CKD, 8/11/23 p/w acute hypercapnic respiratory failure; c/b RUSS    (1)Renal - RUSS on CKD4 ==>newly ESRD-HD as of this admission. Last dialyzed yesterday; due for next HD tomorrow    (2)CV- tenuous hemodynamics - intermittently requiring pressor gtt/on q6h Midodrine     (3)Pulm- trach/vent-dependent    (4)Anemia- on Epogen with HD    (5)GOC - s/p repeated discussions with family regarding GOC - family persisting with interest in aggressive measures.       RECOMMEND:  (1)Next HD tomorrow - 2.4kg UF as able; Epogen with HD  (2)Continued discussions with family regarding GOC                Jimmy Colon MD  [facility]  Office/on call physician: [phone]  Cell (7a-7p): [phone]

## 2023-11-16 NOTE — PROCEDURE NOTE - NSBRONCHPROCDETAILS_GEN_A_CORE_FT
Operators: Jacinto TITUS, Leroy LAKE     Findings:  Bronchoscope inserted through ETT. ETT noted to be in good position. Airway evaluation revealed mucous plugging and thick secretions diffusely throughout all subsegements, which were therapeutically suctioned and removed; BAL performed RLL and LLL. Bronchoscope then withdrawn from ETT.      Specimens:  BAL for micro and cell count
Bronchoscope inserted through tracheostomy. Tracheostomy noted to be well above main ines. Excessive dynamic airway collapse again noted. Distal thick white secretions noted in RLL, therapeutically suctioned. No large airway obstruction noted.  
Bronchoscope inserted through tracheostomy. Tracheostomy noted to be well above the main ines. Pt noted to have excessive dynamic airway collapse with ~90% obstruction on exhalation of posterior membrane extending from distal trachea to RMB. Unable to fully inspect airways given concern for hypoxia. Shiley #6 cuffed exchanged for distal XLT with improvement in TV return. Bronchoscope then re-inserted through new tracheostomy, pt still with excessive dynamic airway collapse of posterior membrane, but improved ~70%.
Bronchoscope inserted through tracheostomy to level of ines.  Airways inspected.  Therapeutic aspiration of secretions mostly from RML and RLL.  BAL done of RML with 15 cc of NS.  Bronch removed from trach. Patient tolerated procedure well.

## 2023-11-16 NOTE — PROGRESS NOTE ADULT - SUBJECTIVE AND OBJECTIVE BOX
Subjective: Patient seen and examined. No new events except as noted.   remains in ICU          REVIEW OF SYSTEMS:  Unable to obtain      MEDICATIONS:  MEDICATIONS  (STANDING):  albuterol/ipratropium for Nebulization 3 milliLiter(s) Nebulizer every 6 hours  artificial tears (preservative free) Ophthalmic Solution 1 Drop(s) Both EYES every 4 hours  aspirin  chewable 81 milliGRAM(s) Enteral Tube daily  atorvastatin 10 milliGRAM(s) Oral at bedtime  calamine/zinc oxide Lotion 1 Application(s) Topical daily  chlorhexidine 0.12% Liquid 15 milliLiter(s) Oral Mucosa every 12 hours  chlorhexidine 2% Cloths 1 Application(s) Topical daily  dextrose 5%. 1000 milliLiter(s) (100 mL/Hr) IV Continuous <Continuous>  dextrose 5%. 1000 milliLiter(s) (50 mL/Hr) IV Continuous <Continuous>  dextrose 50% Injectable 25 Gram(s) IV Push once  dextrose 50% Injectable 12.5 Gram(s) IV Push once  dextrose 50% Injectable 25 Gram(s) IV Push once  dextrose 50% Injectable 25 Gram(s) IV Push once  droxidopa 600 milliGRAM(s) Oral every 8 hours  epoetin odalis (EPOGEN) Injectable 44184 Unit(s) IV Push <User Schedule>  ferrous    sulfate Liquid 300 milliGRAM(s) Oral daily  glucagon  Injectable 1 milliGRAM(s) IntraMuscular once  heparin   Injectable 5000 Unit(s) SubCutaneous every 8 hours  insulin lispro (ADMELOG) corrective regimen sliding scale   SubCutaneous every 6 hours  insulin NPH human recombinant 6 Unit(s) SubCutaneous every 6 hours  midodrine. 40 milliGRAM(s) Oral <User Schedule>  norepinephrine Infusion 0.05 MICROgram(s)/kG/Min (6.23 mL/Hr) IV Continuous <Continuous>  pantoprazole  Injectable 40 milliGRAM(s) IV Push two times a day  petrolatum Ophthalmic Ointment 1 Application(s) Both EYES four times a day  sodium chloride 1 Gram(s) Oral two times a day  sodium chloride 3%  Inhalation 4 milliLiter(s) Inhalation every 6 hours      PHYSICAL EXAM:  T(C): 37.2 (11-16-23 @ 04:00), Max: 37.3 (11-15-23 @ 12:00)  HR: 108 (11-16-23 @ 09:46) (89 - 132)  BP: --  RR: 24 (11-16-23 @ 09:00) (0 - 29)  SpO2: 98% (11-16-23 @ 09:46) (95% - 100%)  Wt(kg): --  I&O's Summary    15 Nov 2023 07:01  -  16 Nov 2023 07:00  --------------------------------------------------------  IN: 1749.7 mL / OUT: 3100 mL / NET: -1350.3 mL            Appearance: NAD, +trach  +NGT  HEENT: dry oral mucosa  Lymphatic: No lymphadenopathy  Cardiovascular: Normal S1 S2, No JVD, No murmurs, No edema  Respiratory: Decreased BS, + trach to vent	  Neuro: opens eyes  Gastrointestinal: Soft, Non-tender, + BS, + NGT  Skin: No rashes, No ecchymoses, No cyanosis	  Extremities: No strength/ROM 2/2 sedation, + BL LE edema  Vascular: Peripheral pulses palpable 2+ bilaterally  Anasarca     Mode: AC/ CMV (Assist Control/ Continuous Mandatory Ventilation), RR (machine): 24, FiO2: 40, PEEP: 8, ITime: 0.8, MAP: 19, PC: 35, PIP: 38      LABS:    CARDIAC MARKERS:                                8.8    15.97 )-----------( 463      ( 16 Nov 2023 02:40 )             29.6     11-16    135  |  100  |  25<H>  ----------------------------<  193<H>  4.1   |  27  |  1.31<H>    Ca    8.7      16 Nov 2023 02:40  Phos  3.6     11-16  Mg     2.30     11-16    TPro  6.2  /  Alb  1.9<L>  /  TBili  <0.2  /  DBili  x   /  AST  17  /  ALT  16  /  AlkPhos  360<H>  11-16    proBNP:   Lipid Profile:   HgA1c:   TSH:     Blood Gas Profile - Arterial (11.16.23 @ 10:03)   pH, Arterial: 7.34: No collection time indicated, please interpret with caution  pCO2, Arterial: 54 mmHg  pO2, Arterial: 256 mmHg  HCO3, Arterial: 29 mmol/L  Base Excess, Arterial: 2.6 mmol/L  Oxygen Saturation, Arterial: 99.1 %  Total CO2, Arterial: 31 mmol/LBlood Gas Arterial - Calcium, Ionized: 1.27 mmol/L (11.16.23 @ 10:03)         TELEMETRY: 	SR ST     ECG:  	  RADIOLOGY:   DIAGNOSTIC TESTING:  [ ] Echocardiogram:  [ ]  Catheterization:  [ ] Stress Test:    OTHER:

## 2023-11-16 NOTE — PROGRESS NOTE ADULT - SUBJECTIVE AND OBJECTIVE BOX
[facility]ROLOGY-[facility] [phone]        Patient seen and examined she had HD yesterday 2.5L removed, vasopressor supported initiated with help with HD now off. Shiley removed yesterday for line holiday.         MEDICATIONS  (STANDING):  albuterol/ipratropium for Nebulization 3 milliLiter(s) Nebulizer every 6 hours  artificial tears (preservative free) Ophthalmic Solution 1 Drop(s) Both EYES every 4 hours  aspirin  chewable 81 milliGRAM(s) Enteral Tube daily  atorvastatin 10 milliGRAM(s) Oral at bedtime  calamine/zinc oxide Lotion 1 Application(s) Topical daily  chlorhexidine 0.12% Liquid 15 milliLiter(s) Oral Mucosa every 12 hours  chlorhexidine 2% Cloths 1 Application(s) Topical daily  dextrose 5%. 1000 milliLiter(s) (50 mL/Hr) IV Continuous <Continuous>  dextrose 5%. 1000 milliLiter(s) (100 mL/Hr) IV Continuous <Continuous>  dextrose 50% Injectable 25 Gram(s) IV Push once  dextrose 50% Injectable 12.5 Gram(s) IV Push once  dextrose 50% Injectable 25 Gram(s) IV Push once  dextrose 50% Injectable 25 Gram(s) IV Push once  droxidopa 600 milliGRAM(s) Oral every 8 hours  epoetin odalis (EPOGEN) Injectable 40517 Unit(s) IV Push <User Schedule>  ferrous    sulfate Liquid 300 milliGRAM(s) Oral daily  glucagon  Injectable 1 milliGRAM(s) IntraMuscular once  heparin   Injectable 5000 Unit(s) SubCutaneous every 8 hours  insulin lispro (ADMELOG) corrective regimen sliding scale   SubCutaneous every 6 hours  insulin NPH human recombinant 6 Unit(s) SubCutaneous every 6 hours  midodrine. 40 milliGRAM(s) Oral <User Schedule>  norepinephrine Infusion 0.05 MICROgram(s)/kG/Min (6.23 mL/Hr) IV Continuous <Continuous>  pantoprazole  Injectable 40 milliGRAM(s) IV Push two times a day  petrolatum Ophthalmic Ointment 1 Application(s) Both EYES four times a day  sodium chloride 1 Gram(s) Oral two times a day  sodium chloride 3%  Inhalation 4 milliLiter(s) Inhalation every 6 hours      VITAL:  T(C): , Max: 37.8 (11-16-23 @ 08:00)  T(F): , Max: 100 (11-16-23 @ 08:00)  HR: 110 (11-16-23 @ 14:00)  BP: --  BP(mean): --  RR: 24 (11-16-23 @ 14:00)  SpO2: 98% (11-16-23 @ 14:00)  Wt(kg): --    I and O's:    11-15 @ 07:01  -  11-16 @ 07:00  --------------------------------------------------------  IN: 1749.7 mL / OUT: 3100 mL / NET: -1350.3 mL    11-16 @ 07:01  -  11-16 @ 14:44  --------------------------------------------------------  IN: 250 mL / OUT: 50 mL / NET: 200 mL          PHYSICAL EXAM:    PHYSICAL EXAM:  Constitutional: critically ill, trach to vent   HEENT: + NGT, + tracheostomy   Respiratory: coarse BS  Cardiovascular: tachy s1s2  Gastrointestinal: BS+, soft, NT/ND  Extremities: ++ generalized edema  Neurological: UTO  : No Wheeler  Skin: No rashes  Access: no access    LABS:                        8.8    15.97 )-----------( 463      ( 16 Nov 2023 02:40 )             29.6     11-16    135  |  100  |  25<H>  ----------------------------<  193<H>  4.1   |  27  |  1.31<H>    Ca    8.7      16 Nov 2023 02:40  Phos  3.6     11-16  Mg     2.30     11-16    TPro  6.2  /  Alb  1.9<L>  /  TBili  <0.2  /  DBili  x   /  AST  17  /  ALT  16  /  AlkPhos  360<H>  11-16          Urine Studies:  Urinalysis Basic - ( 16 Nov 2023 02:40 )    Color: x / Appearance: x / SG: x / pH: x  Gluc: 193 mg/dL / Ketone: x  / Bili: x / Urobili: x   Blood: x / Protein: x / Nitrite: x   Leuk Esterase: x / RBC: x / WBC x   Sq Epi: x / Non Sq Epi: x / Bacteria: x            RADIOLOGY & ADDITIONAL STUDIES:      < from: Xray Chest 1 View- PORTABLE-Urgent (Xray Chest 1 View- PORTABLE-Urgent .) (11.16.23 @ 10:51) >    IMPRESSION:  Moderate right pleural effusion with suggestion of worsening right upper   lobe atelectasis.  Small left effusion and/or atelectasis.    --- End of Report ---      < end of copied text >      Assessment and Plan:   · Assessment	    IMPRESSION: 73F w/ HTN, DM2, CVA, breast CA-bilateral mastectomy, recurrent aspiration pneumonia/respiratory failure, and CKD, 8/11/23 p/w acute hypercapnic respiratory failure; c/b RUSS    (1)Renal - RUSS on CKD4 ==>newly ESRD-HD as of this admission. S/p HD this morning    (2)CV- tenuous hemodynamics - intermittently requiring pressor gtt/on Midodrine 40q6h    (3)Pulm- trach/vent-dependent    (4)Anemia- on Epogen with HD    (5)GOC - s/p repeated discussions with family regarding GOC - family persisting with interest in aggressive measures.     RECOMMEND:  (1)HD tomorrow   (2)PEG placement?  (3)Eventual LTCF with vent/HD  (4)Continued discussions with family regarding GOC    Tere Arauz NP  [facility]  [phone]

## 2023-11-16 NOTE — PROGRESS NOTE ADULT - ASSESSMENT
72 y/o F well known to me from my [facility]. she was admitted at [facility] 7/12-7/22 w aspiration PNA, was treated w CEFEPIME, developed an allergic rash,  dCHF, + MAC on AFB culture, had been progressively getting more and more lethargic and dyspneic at home since DC.   In  am of 8/11/23  ptn presented with respiratory distress w hypoxia and hypercarbia requiring intubation 2/2 volume overload +/- Asp PNA      Neuro   not responsive  Baseline MS AOx3, aphasic   - h/o CVA , on aspirin and statin . resumed w feeding tube, ASA resumed 8/26  - eeg  2/2 tremors, no sz focus, right facial twitching, EEG has been negative. 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 probably embolic in nature, did not tolerate full AC in the past, STEPHANIE is neg , no shunts observed  - ptn is poorly reactive, rpt scan done, no further CVA seen.     Cardiac   cardiology following  CHFpEF   TTE 7/2023 with EF 59%, with severe LVH and diastolic dysfunction   off Levo drip , now only during HD  on midodrine 40 mg qid, droxidopa 600 tid, additional 200 mg prior to HD,     Pulmonary   Acute hypercapnic and hypoxemic respiratory failure   prolonged intubation, now  trache to  vent, has copious secretions      Renal   on HD via L IJ Shiley, tunneled catheter when clinically stable  HD MWF, midodrine and pressor assisted,   permacath  scheduled to be place in IR on 11/14 but cancelled       GI  NPO  on tube feeds  UGI bleed 8/31,  EGD/Colonoscopy: erosive gastropathy, esophagitis on colonoscopy old blood seen no active bleeding, poor prep  NGT-TF, Nepro  s/p 2UPRBC 11/4    Endocrine  on Insulin: NPH, Admelog    ID   complicated infectious hospital course  treated for MSSA bacteremia, aspiration PNA.  sputum + for pseudomonas, ptn was initially on zosyn but was allergic, then was switched to aztreonam   Aztreonam was switched to polymyxin for a possible allergy but ptn didnt have a reaction to aztreonam, she had a reaction to Zosyn.   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 externa resolved    Heme/Onc  on eliquis for  LEFT POPLITEAL VEIN ACUTE DVT , on ELiquis    Ethics  GOC - Discussed GOC with daughter and , they have opted in the past for full code. and she remains full code at present

## 2023-11-16 NOTE — PROGRESS NOTE ADULT - SUBJECTIVE AND OBJECTIVE BOX
Patient is a 73y old  Female who presents with a chief complaint of Respiratory distress (16 Nov 2023 15:23)      SUBJECTIVE / OVERNIGHT EVENTS: not following commands, opens eyes, twitches, HD via L IJ MWF, pressor assisted. on nepro NGT feeds, trache to vent    MEDICATIONS  (STANDING):  albuterol/ipratropium for Nebulization 3 milliLiter(s) Nebulizer every 6 hours  artificial tears (preservative free) Ophthalmic Solution 1 Drop(s) Both EYES every 4 hours  aspirin  chewable 81 milliGRAM(s) Enteral Tube daily  atorvastatin 10 milliGRAM(s) Oral at bedtime  calamine/zinc oxide Lotion 1 Application(s) Topical daily  chlorhexidine 0.12% Liquid 15 milliLiter(s) Oral Mucosa every 12 hours  chlorhexidine 2% Cloths 1 Application(s) Topical daily  dextrose 5%. 1000 milliLiter(s) (50 mL/Hr) IV Continuous <Continuous>  dextrose 5%. 1000 milliLiter(s) (100 mL/Hr) IV Continuous <Continuous>  dextrose 50% Injectable 25 Gram(s) IV Push once  dextrose 50% Injectable 12.5 Gram(s) IV Push once  dextrose 50% Injectable 25 Gram(s) IV Push once  dextrose 50% Injectable 25 Gram(s) IV Push once  droxidopa 600 milliGRAM(s) Oral every 8 hours  epoetin odalis (EPOGEN) Injectable 71177 Unit(s) IV Push <User Schedule>  ferrous    sulfate Liquid 300 milliGRAM(s) Oral daily  glucagon  Injectable 1 milliGRAM(s) IntraMuscular once  heparin   Injectable 5000 Unit(s) SubCutaneous every 8 hours  insulin lispro (ADMELOG) corrective regimen sliding scale   SubCutaneous every 6 hours  insulin NPH human recombinant 6 Unit(s) SubCutaneous every 6 hours  midodrine. 40 milliGRAM(s) Oral <User Schedule>  norepinephrine Infusion 0.05 MICROgram(s)/kG/Min (6.23 mL/Hr) IV Continuous <Continuous>  pantoprazole  Injectable 40 milliGRAM(s) IV Push two times a day  petrolatum Ophthalmic Ointment 1 Application(s) Both EYES four times a day  sodium chloride 1 Gram(s) Oral two times a day  sodium chloride 3%  Inhalation 4 milliLiter(s) Inhalation every 6 hours    MEDICATIONS  (PRN):  acetaminophen   Oral Liquid .. 650 milliGRAM(s) Oral every 6 hours PRN Temp greater or equal to 38C (100.4F), Mild Pain (1 - 3)  dextrose Oral Gel 15 Gram(s) Oral once PRN Blood Glucose LESS THAN 70 milliGRAM(s)/deciliter  diphenhydrAMINE Elixir 50 milliGRAM(s) Oral once PRN Rash and/or Itching  droxidopa 600 milliGRAM(s) Oral <User Schedule> PRN prior to hemodialysis  midodrine. 40 milliGRAM(s) Oral once PRN 1 Hour Pre HD  sodium chloride 0.9% Bolus. 250 milliLiter(s) IV Bolus every 5 minutes PRN SBP LESS THAN or EQUAL to 90 mmHg  sodium chloride 0.9% lock flush 10 milliLiter(s) IV Push every 1 hour PRN Pre/post blood products, medications, blood draw, and to maintain line patency      Vital Signs Last 24 Hrs  T(F): 100 (11-16-23 @ 08:00), Max: 100 (11-16-23 @ 08:00)  HR: 106 (11-16-23 @ 15:29) (89 - 132)  BP: --  RR: 24 (11-16-23 @ 15:00) (0 - 29)  SpO2: 100% (11-16-23 @ 15:29) (95% - 100%)  Telemetry:   CAPILLARY BLOOD GLUCOSE      POCT Blood Glucose.: 157 mg/dL (16 Nov 2023 11:52)  POCT Blood Glucose.: 204 mg/dL (16 Nov 2023 05:11)  POCT Blood Glucose.: 158 mg/dL (15 Nov 2023 23:49)  POCT Blood Glucose.: 165 mg/dL (15 Nov 2023 17:06)    I&O's Summary    15 Nov 2023 07:01  -  16 Nov 2023 07:00  --------------------------------------------------------  IN: 1749.7 mL / OUT: 3100 mL / NET: -1350.3 mL    16 Nov 2023 07:01  -  16 Nov 2023 15:51  --------------------------------------------------------  IN: 250 mL / OUT: 50 mL / NET: 200 mL        PHYSICAL EXAM:  GENERAL: NAD, well-developed  HEAD:  Atraumatic, Normocephalic  EYES: EOMI, PERRLA, conjunctiva and sclera clear  NECK: Supple, No JVD  CHEST/LUNG: Clear to auscultation bilaterally; No wheeze  HEART: Regular rate and rhythm; No murmurs, rubs, or gallops  ABDOMEN: Soft, Nontender, Nondistended; Bowel sounds present  EXTREMITIES:  2+ Peripheral Pulses, No clubbing, cyanosis, or edema  PSYCH: AAOx3  NEUROLOGY: non-focal  SKIN: No rashes or lesions    LABS:                        8.8    15.97 )-----------( 463      ( 16 Nov 2023 02:40 )             29.6     11-16    135  |  100  |  25<H>  ----------------------------<  193<H>  4.1   |  27  |  1.31<H>    Ca    8.7      16 Nov 2023 02:40  Phos  3.6     11-16  Mg     2.30     11-16    TPro  6.2  /  Alb  1.9<L>  /  TBili  <0.2  /  DBili  x   /  AST  17  /  ALT  16  /  AlkPhos  360<H>  11-16          Urinalysis Basic - ( 16 Nov 2023 02:40 )    Color: x / Appearance: x / SG: x / pH: x  Gluc: 193 mg/dL / Ketone: x  / Bili: x / Urobili: x   Blood: x / Protein: x / Nitrite: x   Leuk Esterase: x / RBC: x / WBC x   Sq Epi: x / Non Sq Epi: x / Bacteria: x        RADIOLOGY & ADDITIONAL TESTS:    Imaging Personally Reviewed:    Consultant(s) Notes Reviewed:      Care Discussed with Consultants/Other Providers:

## 2023-11-16 NOTE — PROGRESS NOTE ADULT - ASSESSMENT
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       NEUROLOGY  #AMS  # Multiple embolic strokes   # L PCA Infarct   MR head performed w/ new infarct and old infarcts, EEG without seizure events but with high foci potential   - EEG given facial twitching: negative for epileptiform activity (10/31)  - deferring repeat MRI as unlikely to   - continue Lipitor,   - Keppra stopped 11/10 (was on as seizure ppx given multiple infarcts, no seizures on multiple EEG, stopped to ensure it is not contributing to sedation), the same mental status, -- opens eyes spontaneously, and shakes her head intermittently, not following commands    - aspirin held 10/11, likely in s/o GIB, Hgb stable not requiring PRBC since 11/4, ASA 81 mg restarted on 11/14;       CARDIOVASCULAR  # Septic vs vasoplegic shock   Etiology likely septic iso active infection. Possible component of vasoplegic, however no longer with active infection or on sedation. Off IV vasopressors.   Current regimen:   - droxidopa 600mg q8h with PRN 600mg ordered preHD  - midodrine 40mg q6 h; trial of additional 40mg prior to HD  - required levophed with HD 11/13 up to 0.05, and is been off pressors since 11/13, 16:00; today with HD requiring pressors __ Levophed 0.14, for fluid removal     # HFpEF w/ decompensation   > ECHO w/ EF 59 with severe LVH and diastolic dysfunction   - fluid overloaded, + 26 L, HD to remove fluids     HEENT   # Left ear OE with acute on chronic left-sided otomastoiditis, s/p Abx (see in ID)   - noted to have white discharged/residue, increased from prior per patient's daughter; ENT reconsulted, resumed on cipro drops (10/31 - 11/7),   # 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   - resolved    RESPIRATORY  # AHRF second to volume overload   # Copious inline and oral thick tanned secretions   - CKD vs HFpEF w/ hypercarbia 2/2 volume overload requiring intubation, trach, and HD initiation  - Continue on albuterol and chest PT, cont 3 % INH, added IPV,   - Continue volume removal with HD     #tracheomalacia   - CT CHEST (9/8) with tracheomalacia, BL pleural effusions and persistent consolidations     GI  # GIB: last pRBC transfused 11/4 (10/14 before that)   - Continue on PPI BID  - patient had melena before 11/13 but H/H stable will hold off on restarting Eliquis, now with dark brown/black stool  - -as per nursing staff, stool is black 300 in the rectal system in 24 hrs     # Dysphagia   - NGT- tolerating feeds, at goal- changed to nepro was per nutrition    RENAL  # ESRD, L ARTHUR odom   - HD MWF TIW with midodrine and droxidopa   - ICU for vasopressor assisted volume removal, cap to 1 pressor and not offering CRRT due to the comorbidities and prognosis   - f/u w/ nephrology: will continue to offer patient 1-pressor assisted HD as agreed prior, as long as she can tolerate HD maxed on 1 pressor, Levo to 0.3; she will be considered iHD candidate  - UF -2.2 L w/ HD (11/13); - required levophed with HD 11/13 up to 0.05, and is been off pressors since 11/13, 16:00; today with HD requiring pressors __ Levophed 0.14, for fluid removal. and down to 0.08, 2.5 L of fluids removed  - IR was planning PermCath 11/14; but procedure was cancelled due to pt in MICU, on vasopressors, and has leucocytosis      INFECTIOUS DISEASE  #Septic shock  T max 98.2, WBC - 13>14>15>16>18, check LA with am labs  #blood cx 9/1: MSSA with hypotension  repeat negative 9/4, 9/8 s/p 4 weeks of vanco/dapto through 10/2  #Colonized with  pseudomonas   # MSSA/ESBL E Coli PNA  # L otitis Externa s/p ENT debridement c/b Candida, s/p Meropenem and Fluconazole  # Proteus/ Pseudomonas in sputum, s/p Aztreonam   -recent Bcx 11/2 negative, sputum 11/2 with  pseudomonas- colonized   - repeat MRSA PCR neg  - afebrile now after completing course aztreonam 11/6  - monitoring off abx     # possible malignant otitis externa   # ESBL ECOLI and MSSA VAP c/b MSSA bacteremia   - hx of MSSA bacteremia, ESBL E. Coli sputum Cx, BAL w/ MSSA  - treated with abx   - eosinophilia workup: JORGE, ANCAs negative    HEME  # Anemia second to GIB vs renal disease   - multiple transfusions, last done 11/4  - Eliquis held 2/2 pt having melena, and now pt with black stools/dark brown  -- 11/14 -- restarted ASA 81 mg, restarted Heparin SQ prophylaxis today, and Eliquis tomorrow if no bleeding and counts stable     #Allergic transfusion reaction: per R[facility] documentation, developed erythematous rash across chest shortly after starting transfusion, blood bank consulted and diagnosed mild allergic rxn  - premedicate w/ benadryl and famotidine prior to future transfusions  - no issue w/ pRBC transfusion 11/4    VASCULAR   # LLE POP DVT   -- on Eliquis (held as of 11/11 PM for procedure and also patient having melena), stools are black/dark brown  -- restarted Heparin SQ prophylaxis today, and Eliquis tomorrow if no bleeding and counts stable   - SCDs,     # HD access  - [facility] (9/27 - 10/21)  - [facility] shiley replaced for HD (10/24 -11/15 ), >> L IJ shiley was removed today after HD and new line will be placed when pt need next HD session, will need Trialysis Shiley line, which is at the bedside and consent obtained from the family   - Permacath was planned on 11/14, but cancelled 2/2 pt in MICU, requiring pressors, and + leucocytosis     ONC   # Hx of Breast CA   - Patient dx in 2018 and s/p B/L mastectomy (radical on right) and chemo and RT.     ENDOCRINE  # IDDM2   - Continued on NPH with ISS, however noted with hypoglycemic episodes and NPH dc'ed and restarted   - -- >98>98__   Continue on NPH 6U with moderate ISS.   - Adjust as needed      ETHICS/ GOC    - Attempted palliative discussion however family not interested and wishes for FULL CODE  - Family continues to want everything done despite inability to tolerate HD on multiple oral pressor  - Levo capped at 0.3 during HD   --Today daughter Rema at the bedside, daughter Pretti on the phone, and spouse on the phone were updated about pt's condition, all questions were answered      72 YO F with PMHx of IDDM2, HTN, Diabetic Nephropathic CKD, HFpEF, Breast Cancer s/p BL mastectomy, chemotherapy and radiation (2018), Respiratory and Cardiac Arrest (2018), 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       NEUROLOGY  #AMS  # Multiple embolic strokes   # L PCA Infarct   MR head performed w/ new infarct and old infarcts, EEG without seizure events but with high foci potential   - EEG given facial twitching: negative for epileptiform activity (10/31)  - deferring repeat MRI as unlikely to   - baseline  AOX3 at home per daughter, currently spontaneously opening eyes, localized to voice and pain, trached/non-verbal   - Keppra stopped 11/10 (was on as seizure ppx given multiple infarcts, no seizures on multiple EEG, stopped to ensure it is not contributing to sedation), the same mental status, -- opens eyes spontaneously, and shakes her head intermittently, not following commands    - aspirin held 10/11, likely in s/o GIB, Hgb stable not requiring PRBC since 11/4, ASA 81 mg restarted on 11/14;   - continue Lipitor    CARDIOVASCULAR  # Septic vs vasoplegic shock   Etiology likely septic iso active infection. Possible component of vasoplegic, however no longer with active infection or on sedation. Off IV vasopressors.   Current regimen:   - droxidopa 600mg q8h with PRN 600mg ordered preHD  - midodrine 40mg q6 h; trial of additional 40mg prior to HD  - required levophed up to 0.14 during HD 11/15  weaned off after dialysis session, will need additional midodrine/droxidopa dose preHD    # HFpEF w/ decompensation   > ECHO w/ EF 59 with severe LVH and diastolic dysfunction   - fluid overloaded, + 26 L, HD to remove fluids     HEENT   # Left ear OE with acute on chronic left-sided otomastoiditis, s/p Abx (see in ID)   - noted to have white discharged/residue, increased from prior per patient's daughter; ENT reconsulted, resumed on cipro drops (10/31 - 11/7),   # 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   - resolved    RESPIRATORY  # AHRF second to volume overload   # Copious inline and oral thick tanned secretions   - CKD vs HFpEF w/ hypercarbia 2/2 volume overload requiring intubation, trach, and HD initiation  - Continue on albuterol and chest PT, cont 3 % INH, added IPV,   - Continue volume removal with HD  - hypoxemia w/ turning and repositioning, CXR 11/16 shows modereate right pleural effusion, worsening RUL atelectasis, left has small effusion and/or atelectasis. Plan for flexible bronchoscopy and possible thoracentesis  - CXR 11/16 shows moderate right pleural effusion with suggestion of worsening RUL atelectasis, small left effusion and/or atelectasis.  - Bronchoscopy bedside 11/16 noted with thick yellow/tan secretions right > left     #tracheomalacia   - CT CHEST (9/8) with tracheomalacia, BL pleural effusions and persistent consolidations     GI  # GIB: last pRBC transfused 11/4 (10/14 before that)   - Continue on PPI BID  - patient had melena before 11/13 but H/H stable,  Eliquis was held for dark brown/black stool  - as per nursing staff, stool is black 100 in the rectal system in 24 hrs     # Dysphagia   - NGT- tolerating feeds, at goal- changed to nepro was per nutrition    RENAL  # ESRD, L IJ ilene   - HD MWF TIW with midodrine and droxidopa   - ICU for vasopressor assisted volume removal, cap to 1 pressor and not offering CRRT due to the comorbidities and prognosis   - f/u w/ nephrology: will continue to offer patient 1-pressor assisted HD as agreed prior, as long as she can tolerate HD maxed on 1 pressor, Levo to 0.3; she will be considered iHD candidate  - UF -2.2 L w/ HD (11/13); - required levophed with HD 11/13 up to 0.05, and is been off pressors since 11/13, 16:00; today with HD requiring pressors __ Levophed 0.14, for fluid removal. and down to 0.08, 2.5 L of fluids removed  - IR was planning PermCath 11/14; but procedure was cancelled due to pt in MICU, on vasopressors, and has leucocytosis  - TRISHA taylor discontinued 11/16 for line holiday, plan for new shiley (TRIALYSIS) placement in AM, scheduled for HD 11/17      INFECTIOUS DISEASE  #Septic shock  T max 98.2, WBC - 13>14>15>16>18, check LA with am labs  #blood cx 9/1: MSSA with hypotension  repeat negative 9/4, 9/8 s/p 4 weeks of vanco/dapto through 10/2  #Colonized with  pseudomonas   # MSSA/ESBL E Coli PNA  # L otitis Externa s/p ENT debridement c/b Candida, s/p Meropenem and Fluconazole  # Proteus/ Pseudomonas in sputum, s/p Aztreonam   -recent Bcx 11/2 negative, sputum 11/2 with  pseudomonas- colonized   - repeat MRSA PCR neg  - afebrile now after completing course aztreonam 11/6  - monitoring off abx     # possible malignant otitis externa   # ESBL ECOLI and MSSA VAP c/b MSSA bacteremia   - hx of MSSA bacteremia, ESBL E. Coli sputum Cx, BAL w/ MSSA  - treated with abx   - eosinophilia workup: JORGE, ANCAs negative    HEME  # Anemia second to GIB vs renal disease   - multiple transfusions, last done 11/4  - Eliquis held 2/2 pt having melena, and now pt with black stools/dark brown  - 11/14  restarted ASA 81 mg,   - restarted Heparin SQ prophylaxis 11/14 today,  - consider restarting AC tomorrow after shiley/line placement if no signs of bleeding and stable    #Allergic transfusion reaction: per [facility] documentation, developed erythematous rash across chest shortly after starting transfusion, blood bank consulted and diagnosed mild allergic rxn  - premedicate w/ benadryl and famotidine prior to future transfusions  - no issue w/ pRBC transfusion 11/4    VASCULAR   # LLE POP DVT   - on Eliquis (held as of 11/11 PM for procedure and also patient having melena), stools are black/dark brown  - restarted Heparin SQ prophylaxis 11/14 and Eliquis tomorrow if no bleeding and counts stable   - SCDs,     # HD access  - [facility] (9/27 - 10/21)  - [facility] shiley replaced for HD (10/24 -11/15 ), >> L IJ shiley was removed today after HD and new line will be placed when pt need next HD session, will need Trialysis Shiley line, which is at the bedside and consent obtained from the family   - Permacath was planned on 11/14, but cancelled 2/2 pt in MICU, requiring pressors, and + leucocytosis     ONC   # Hx of Breast CA   - Patient dx in 2018 and s/p B/L mastectomy (radical on right) and chemo and RT.     ENDOCRINE  # IDDM2   - Continued on NPH with ISS, however noted with hypoglycemic episodes and NPH dc'ed and restarted   - -- >98>98__   Continue on NPH 6U with moderate ISS.   - Adjust as needed      ETHICS/ GOC    - Attempted palliative discussion however family not interested and wishes for FULL CODE  - Family continues to want everything done despite inability to tolerate HD on multiple oral pressor  - Levo capped at 0.3 during HD   --Today daughter Rema at the bedside, daughter Pretti on the phone, and spouse on the phone were updated about pt's condition, all questions were answered      74 YO F with PMHx of IDDM2, HTN, Diabetic Nephropathic CKD, HFpEF, Breast Cancer s/p BL mastectomy, chemotherapy and radiation (2018), Respiratory and Cardiac Arrest (2018), 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       NEUROLOGY  #AMS  # Multiple embolic strokes   # L PCA Infarct   MR head performed w/ new infarct and old infarcts, EEG without seizure events but with high foci potential   - EEG given facial twitching: negative for epileptiform activity (10/31)  - deferring repeat MRI as unlikely to   - baseline  AOX3 at home per daughter, currently spontaneously opening eyes, localized to voice and pain, trached/non-verbal   - Keppra stopped 11/10 (was on as seizure ppx given multiple infarcts, no seizures on multiple EEG, stopped to ensure it is not contributing to sedation), the same mental status, -- opens eyes spontaneously, and shakes her head intermittently, not following commands    - aspirin held 10/11, likely in s/o GIB, Hgb stable not requiring PRBC since 11/4, ASA 81 mg restarted on 11/14;   - continue Lipitor    CARDIOVASCULAR  # Septic vs vasoplegic shock   Etiology likely septic iso active infection. Possible component of vasoplegic, however no longer with active infection or on sedation. Off IV vasopressors.   Current regimen:   - droxidopa 600mg q8h with PRN 600mg ordered preHD  - midodrine 40mg q6 h; trial of additional 40mg prior to HD  - required levophed up to 0.14 during HD 11/15  weaned off after dialysis session, will need additional midodrine/droxidopa dose preHD    # HFpEF w/ decompensation   > ECHO w/ EF 59 with severe LVH and diastolic dysfunction   - fluid overloaded, + 26 L, HD to remove fluids     HEENT   # Left ear OE with acute on chronic left-sided otomastoiditis, s/p Abx (see in ID)   - noted to have white discharged/residue, increased from prior per patient's daughter; ENT reconsulted, resumed on cipro drops (10/31 - 11/7),   # 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   - resolved    RESPIRATORY  # AHRF second to volume overload   # Copious inline and oral thick tanned secretions   - CKD vs HFpEF w/ hypercarbia 2/2 volume overload requiring intubation, trach, and HD initiation  - Continue on albuterol and chest PT, cont 3 % INH, added IPV,   - Continue volume removal with HD  - hypoxemia w/ turning and repositioning, CXR 11/16 shows modereate right pleural effusion, worsening RUL atelectasis, left has small effusion and/or atelectasis. Plan for flexible bronchoscopy and possible thoracentesis  - CXR 11/16 shows moderate right pleural effusion with suggestion of worsening RUL atelectasis, small left effusion and/or atelectasis.  - Bronchoscopy bedside 11/16 noted with white secretions right > left, BAL sent     #tracheomalacia   - CT CHEST (9/8) with tracheomalacia, BL pleural effusions and persistent consolidations     GI  # GIB: last pRBC transfused 11/4 (10/14 before that)   - Continue on PPI BID  - patient had melena before 11/13 but H/H stable,  Eliquis was held for dark brown/black stool  - as per nursing staff, stool is black 100 in the rectal system in 24 hrs     # Dysphagia   - NGT- tolerating feeds, at goal- changed to nepro was per nutrition    RENAL  # ESRD, L IJ ilene   - HD MWF TIW with midodrine and droxidopa   - ICU for vasopressor assisted volume removal, cap to 1 pressor and not offering CRRT due to the comorbidities and prognosis   - f/u w/ nephrology: will continue to offer patient 1-pressor assisted HD as agreed prior, as long as she can tolerate HD maxed on 1 pressor, Levo to 0.3; she will be considered iHD candidate  - UF -2.2 L w/ HD (11/13); - required levophed with HD 11/13 up to 0.05, and is been off pressors since 11/13, 16:00; today with HD requiring pressors __ Levophed 0.14, for fluid removal. and down to 0.08, 2.5 L of fluids removed  - IR was planning PermCath 11/14; but procedure was cancelled due to pt in MICU, on vasopressors, and has leucocytosis  - TRISHA odom discontinued 11/16 for line holiday, plan for new shiley (TRIALYSIS) placement in AM, scheduled for HD 11/17      INFECTIOUS DISEASE  #Septic shock  T max 98.2, WBC - 13>14>15>16>18, check LA with am labs  #blood cx 9/1: MSSA with hypotension  repeat negative 9/4, 9/8 s/p 4 weeks of vanco/dapto through 10/2  #Colonized with  pseudomonas   # MSSA/ESBL E Coli PNA  # L otitis Externa s/p ENT debridement c/b Candida, s/p Meropenem and Fluconazole  # Proteus/ Pseudomonas in sputum, s/p Aztreonam   -recent Bcx 11/2 negative, sputum 11/2 with  pseudomonas- colonized   - repeat MRSA PCR neg  - afebrile now after completing course aztreonam 11/6  - monitoring off abx   - f/u BAL (11/16)    # possible malignant otitis externa   # ESBL ECOLI and MSSA VAP c/b MSSA bacteremia   - hx of MSSA bacteremia, ESBL E. Coli sputum Cx, BAL w/ MSSA  - treated with abx   - eosinophilia workup: JORGE, ANCAs negative    HEME  # Anemia second to GIB vs renal disease   - multiple transfusions, last done 11/4  - Eliquis held 2/2 pt having melena, and now pt with black stools/dark brown  - 11/14  restarted ASA 81 mg,   - restarted Heparin SQ prophylaxis 11/14 today,  - consider restarting AC tomorrow after shiley/line placement if no signs of bleeding and stable    #Allergic transfusion reaction: per R[facility] documentation, developed erythematous rash across chest shortly after starting transfusion, blood bank consulted and diagnosed mild allergic rxn  - premedicate w/ benadryl and famotidine prior to future transfusions  - no issue w/ pRBC transfusion 11/4    VASCULAR   # LLE POP DVT   - on Eliquis (held as of 11/11 PM for procedure and also patient having melena), stools are black/dark brown  - restarted Heparin SQ prophylaxis 11/14 and Eliquis tomorrow if no bleeding and counts stable   - SCDs,     # HD access  - [facility] (9/27 - 10/21)  - [facility] shiley replaced for HD (10/24 -11/15 ), >> L IJ shiley was removed today after HD and new line will be placed when pt need next HD session, will need Trialysis Shiley line, which is at the bedside and consent obtained from the family   - Permacath was planned on 11/14, but cancelled 2/2 pt in MICU, requiring pressors, and + leucocytosis     ONC   # Hx of Breast CA   - Patient dx in 2018 and s/p B/L mastectomy (radical on right) and chemo and RT.     ENDOCRINE  # IDDM2   - Continued on NPH with ISS, however noted with hypoglycemic episodes and NPH dc'ed and restarted   - -- >98>98__   Continue on NPH 6U with moderate ISS.   - Adjust as needed      ETHICS/ GOC    - Attempted palliative discussion however family not interested and wishes for FULL CODE  - Family continues to want everything done despite inability to tolerate HD on multiple oral pressor  - Levo capped at 0.3 during HD   --Today daughter Rema at the bedside, daughter Pretti on the phone, and spouse on the phone were updated about pt's condition, all questions were answered

## 2023-11-16 NOTE — PROCEDURE NOTE - NSBRONCHFINDINGS_GEN_A_CORE_FT
Tracheostomy in appropriate position.  Moderate amount of white secretions in R mainstem.   Scant secretions in L.  No endobronchial lesions, no bleeding noted.

## 2023-11-17 NOTE — PROGRESS NOTE ADULT - SUBJECTIVE AND OBJECTIVE BOX
Patient is a 73y old  Female who presents with a chief complaint of Respiratory distress (17 Nov 2023 12:30)      SUBJECTIVE / OVERNIGHT EVENTS: ptn is with worsening leukocytosis up tp 27. 5K, CXR w no new infiltrates, PACO odom needed to be readjusted. ID Dr. Calderon who knows the ptn well was notified    MEDICATIONS  (STANDING):  albuterol/ipratropium for Nebulization 3 milliLiter(s) Nebulizer every 6 hours  artificial tears (preservative free) Ophthalmic Solution 1 Drop(s) Both EYES every 4 hours  aspirin  chewable 81 milliGRAM(s) Enteral Tube daily  atorvastatin 10 milliGRAM(s) Oral at bedtime  calamine/zinc oxide Lotion 1 Application(s) Topical daily  chlorhexidine 0.12% Liquid 15 milliLiter(s) Oral Mucosa every 12 hours  chlorhexidine 2% Cloths 1 Application(s) Topical daily  chlorhexidine 4% Liquid 1 Application(s) Topical <User Schedule>  dextrose 5%. 1000 milliLiter(s) (50 mL/Hr) IV Continuous <Continuous>  dextrose 5%. 1000 milliLiter(s) (100 mL/Hr) IV Continuous <Continuous>  dextrose 50% Injectable 25 Gram(s) IV Push once  dextrose 50% Injectable 12.5 Gram(s) IV Push once  dextrose 50% Injectable 25 Gram(s) IV Push once  dextrose 50% Injectable 25 Gram(s) IV Push once  droxidopa 600 milliGRAM(s) Oral every 8 hours  epoetin odalis (EPOGEN) Injectable 88370 Unit(s) IV Push <User Schedule>  ferrous    sulfate Liquid 300 milliGRAM(s) Oral daily  glucagon  Injectable 1 milliGRAM(s) IntraMuscular once  heparin   Injectable 5000 Unit(s) SubCutaneous every 8 hours  insulin lispro (ADMELOG) corrective regimen sliding scale   SubCutaneous every 6 hours  insulin NPH human recombinant 6 Unit(s) SubCutaneous every 6 hours  midodrine. 40 milliGRAM(s) Oral <User Schedule>  norepinephrine Infusion 0.05 MICROgram(s)/kG/Min (6.23 mL/Hr) IV Continuous <Continuous>  pantoprazole  Injectable 40 milliGRAM(s) IV Push two times a day  petrolatum Ophthalmic Ointment 1 Application(s) Both EYES four times a day  sodium chloride 1 Gram(s) Oral two times a day  sodium chloride 3%  Inhalation 4 milliLiter(s) Inhalation every 6 hours    MEDICATIONS  (PRN):  acetaminophen   Oral Liquid .. 650 milliGRAM(s) Oral every 6 hours PRN Temp greater or equal to 38C (100.4F), Mild Pain (1 - 3)  dextrose Oral Gel 15 Gram(s) Oral once PRN Blood Glucose LESS THAN 70 milliGRAM(s)/deciliter  diphenhydrAMINE Elixir 50 milliGRAM(s) Oral once PRN Rash and/or Itching  droxidopa 600 milliGRAM(s) Oral <User Schedule> PRN prior to hemodialysis  midodrine. 40 milliGRAM(s) Oral once PRN 1 Hour Pre HD  sodium chloride 0.9% Bolus. 250 milliLiter(s) IV Bolus every 5 minutes PRN SBP LESS THAN or EQUAL to 90 mmHg  sodium chloride 0.9% lock flush 10 milliLiter(s) IV Push every 1 hour PRN Pre/post blood products, medications, blood draw, and to maintain line patency      Vital Signs Last 24 Hrs  T(F): 98 (11-17-23 @ 16:00), Max: 99.1 (11-17-23 @ 00:00)  HR: 115 (11-17-23 @ 18:15) (94 - 124)  BP: --  RR: 19 (11-17-23 @ 18:15) (16 - 29)  SpO2: 94% (11-17-23 @ 18:15) (94% - 100%)  Telemetry:   CAPILLARY BLOOD GLUCOSE      POCT Blood Glucose.: 116 mg/dL (17 Nov 2023 17:11)  POCT Blood Glucose.: 117 mg/dL (17 Nov 2023 11:45)  POCT Blood Glucose.: 165 mg/dL (17 Nov 2023 05:33)  POCT Blood Glucose.: 168 mg/dL (16 Nov 2023 23:46)    I&O's Summary    16 Nov 2023 07:01  -  17 Nov 2023 07:00  --------------------------------------------------------  IN: 870 mL / OUT: 250 mL / NET: 620 mL    17 Nov 2023 07:01  -  17 Nov 2023 18:25  --------------------------------------------------------  IN: 370 mL / OUT: 50 mL / NET: 320 mL        PHYSICAL EXAM:  GENERAL: NAD, well-developed  HEAD:  Atraumatic, Normocephalic  EYES: EOMI, PERRLA, conjunctiva and sclera clear  NECK: Supple, No JVD  CHEST/LUNG: Clear to auscultation bilaterally; No wheeze  HEART: Regular rate and rhythm; No murmurs, rubs, or gallops  ABDOMEN: Soft, Nontender, Nondistended; Bowel sounds present  EXTREMITIES:  2+ Peripheral Pulses, No clubbing, cyanosis, or edema  PSYCH: AAOx3  NEUROLOGY: non-focal  SKIN: No rashes or lesions    LABS:                        8.9    27.48 )-----------( 466      ( 17 Nov 2023 01:04 )             30.9     11-17    136  |  99  |  29<H>  ----------------------------<  175<H>  4.1   |  25  |  1.49<H>    Ca    8.8      17 Nov 2023 01:04  Phos  4.1     11-17  Mg     2.40     11-17    TPro  6.2  /  Alb  1.8<L>  /  TBili  0.2  /  DBili  x   /  AST  18  /  ALT  12  /  AlkPhos  330<H>  11-17          Urinalysis Basic - ( 17 Nov 2023 01:04 )    Color: x / Appearance: x / SG: x / pH: x  Gluc: 175 mg/dL / Ketone: x  / Bili: x / Urobili: x   Blood: x / Protein: x / Nitrite: x   Leuk Esterase: x / RBC: x / WBC x   Sq Epi: x / Non Sq Epi: x / Bacteria: x

## 2023-11-17 NOTE — PROGRESS NOTE ADULT - SUBJECTIVE AND OBJECTIVE BOX
NEPHROLOGY     Patient seen and examined with spouse at bedside, pt salbador mitchell.     MEDICATIONS  (STANDING):  albuterol/ipratropium for Nebulization 3 milliLiter(s) Nebulizer every 6 hours  artificial tears (preservative free) Ophthalmic Solution 1 Drop(s) Both EYES every 4 hours  aspirin  chewable 81 milliGRAM(s) Enteral Tube daily  atorvastatin 10 milliGRAM(s) Oral at bedtime  calamine/zinc oxide Lotion 1 Application(s) Topical daily  chlorhexidine 0.12% Liquid 15 milliLiter(s) Oral Mucosa every 12 hours  chlorhexidine 2% Cloths 1 Application(s) Topical daily  chlorhexidine 4% Liquid 1 Application(s) Topical <User Schedule>  dextrose 5%. 1000 milliLiter(s) (50 mL/Hr) IV Continuous <Continuous>  dextrose 5%. 1000 milliLiter(s) (100 mL/Hr) IV Continuous <Continuous>  dextrose 50% Injectable 25 Gram(s) IV Push once  dextrose 50% Injectable 12.5 Gram(s) IV Push once  dextrose 50% Injectable 25 Gram(s) IV Push once  dextrose 50% Injectable 25 Gram(s) IV Push once  droxidopa 600 milliGRAM(s) Oral every 8 hours  epoetin odalis (EPOGEN) Injectable 82101 Unit(s) IV Push <User Schedule>  ferrous    sulfate Liquid 300 milliGRAM(s) Oral daily  glucagon  Injectable 1 milliGRAM(s) IntraMuscular once  heparin   Injectable 5000 Unit(s) SubCutaneous every 8 hours  insulin lispro (ADMELOG) corrective regimen sliding scale   SubCutaneous every 6 hours  insulin NPH human recombinant 6 Unit(s) SubCutaneous every 6 hours  midodrine. 40 milliGRAM(s) Oral <User Schedule>  norepinephrine Infusion 0.05 MICROgram(s)/kG/Min (6.23 mL/Hr) IV Continuous <Continuous>  pantoprazole  Injectable 40 milliGRAM(s) IV Push two times a day  petrolatum Ophthalmic Ointment 1 Application(s) Both EYES four times a day  sodium chloride 1 Gram(s) Oral two times a day  sodium chloride 3%  Inhalation 4 milliLiter(s) Inhalation every 6 hours    VITALS:  T(C): , Max: 37.3 (11-17-23 @ 00:00)  T(F): , Max: 99.1 (11-17-23 @ 00:00)  HR: 104 (11-17-23 @ 12:00)  BP: --  RR: 24 (11-17-23 @ 12:00)  SpO2: 98% (11-17-23 @ 11:25)    PHYSICAL EXAM:  Constitutional: critically ill, trach to vent   HEENT: + NGT, + tracheostomy   Respiratory: coarse BS  Cardiovascular: tachy s1s2  Gastrointestinal: BS+, soft, NT/ND  Extremities: ++ generalized edema  : No Wheeler  Skin: No rashes  Access: [facility]     LABS:                        8.9    27.48 )-----------( 466      ( 17 Nov 2023 01:04 )             30.9     11-17    136  |  99  |  29<H>  ----------------------------<  175<H>  4.1   |  25  |  1.49<H>    Ca    8.8      17 Nov 2023 01:04  Phos  4.1     11-17  Mg     2.40     11-17    TPro  6.2  /  Alb  1.8<L>  /  TBili  0.2  /  DBili  x   /  AST  18  /  ALT  12  /  AlkPhos  330<H>  11-17

## 2023-11-17 NOTE — PROGRESS NOTE ADULT - SUBJECTIVE AND OBJECTIVE BOX
CHIEF COMPLAINT: sob    Interval Events: No significant events reported overnight.      HPI:  74 y/o F DM on insulin, HTN, CKD,breast CA, respiratory arrest and cardiac arrest (2018), CVA with residual weakness, aspiration PNA h/o trache, PEG, both removed presents with respiratory distress requiring intubation. Had recent admission d/c 7/22/23 for cough and respiratory distress (no intubation) thought to be i/s/o aspiration PNA s/p cefepime course; developed rash in response. Infectious w/u was negative at this time. Was discharged home, daughter and  at bedside providing history.     Reports that she was initially improving with O2 sats in the high 90s on room air, however, then became more lethargic and not herself (baseline mental status AOx3). Also had worsening shortness of breath while laying down and leg swelling. Contacted cardiologist who started her on bumex 1mg daily. Developed low sugars overnight before admission and was given juice with some food, daughter reports no coughing during episode. At around 4-5 AM developed vomiting and coughing, O2 sats dropped to 80s and EMS was called. Denies fevers/chills, dysuria or urinary frequency, chest pain, palpitations. Uses wheelchair at home however family moves her around frequently.     In ED, was on BiPAP with increased WOB and was intubated. Found to have elevated pro-BNP and CO2 of 111 on VBG. CXR with small right pleural effusion, started on vanc in the ED.  (11 Aug 2023 09:08)      Subjective: pt unable to provide any history in s/o cognitive impairment       OBJECTIVE:  ICU Vital Signs Last 24 Hrs  T(C): 37.2 (16 Nov 2023 04:00), Max: 37.4 (15 Nov 2023 06:50)  T(F): 98.9 (16 Nov 2023 04:00), Max: 99.3 (15 Nov 2023 06:50)  HR: 107 (16 Nov 2023 03:10) (89 - 128)  BP: --  BP(mean): --  ABP: 145/50 (16 Nov 2023 03:00) (86/34 - 150/56)  ABP(mean): 87 (16 Nov 2023 03:00) (53 - 94)  RR: 24 (16 Nov 2023 03:00) (9 - 24)  SpO2: 100% (16 Nov 2023 03:10) (94% - 100%)    O2 Parameters below as of 16 Nov 2023 04:00  Patient On (Oxygen Delivery Method): ventilator    O2 Concentration (%): 40      Mode: AC/ CMV (Assist Control/ Continuous Mandatory Ventilation), RR (machine): 24, FiO2: 40, PEEP: 8, ITime: 0.8, MAP: 19, PC: 35, PIP: 38    11-14 @ 07:01  -  11-15 @ 07:00  --------------------------------------------------------  IN: 480 mL / OUT: 400 mL / NET: 80 mL    11-15 @ 07:01  -  11-16 @ 06:15  --------------------------------------------------------  IN: 1599.7 mL / OUT: 3100 mL / NET: -1500.3 mL      CAPILLARY BLOOD GLUCOSE      POCT Blood Glucose.: 204 mg/dL (16 Nov 2023 05:11)    Physical Exam:   Constitutional: NAD, well-groomed, well-developed  HEENT: PERRLA, EOMI, no drainage or redness  Neck: supple,  No JVD, Trachea midline  Back: Normal spine flexure, No CVA tenderness, No deformity or limitation of movement  Respiratory: Breath Sounds diminished bilaterally to auscultation, no accessory muscle use noted  Cardiovascular: Regular rate, regular rhythm, normal S1, S2; no murmurs or rub  Gastrointestinal: Soft, non-tender, non distended, no hepatosplenomegaly, normal bowel sounds  Extremities: 3+ peripheral edema, no cyanosis, no clubbing   Vascular: Equal and normal pulses: 2+ peripheral pulses throughout  Neurological: opens eyes spontaneously, sometimes shakes her head, no sensory, motor  deficits, normal reflexes  Psychiatric: calm, normal mood, normal affect  Musculoskeletal: No joint swelling or deformity; no limitation of movement  Skin: warm, dry, well perfused, no rashes          HOSPITAL MEDICATIONS:  Standing Meds:  albuterol/ipratropium for Nebulization 3 milliLiter(s) Nebulizer every 6 hours  artificial tears (preservative free) Ophthalmic Solution 1 Drop(s) Both EYES every 4 hours  aspirin  chewable 81 milliGRAM(s) Enteral Tube daily  atorvastatin 10 milliGRAM(s) Oral at bedtime  calamine/zinc oxide Lotion 1 Application(s) Topical daily  chlorhexidine 0.12% Liquid 15 milliLiter(s) Oral Mucosa every 12 hours  chlorhexidine 2% Cloths 1 Application(s) Topical daily  dextrose 5%. 1000 milliLiter(s) IV Continuous <Continuous>  dextrose 5%. 1000 milliLiter(s) IV Continuous <Continuous>  dextrose 50% Injectable 25 Gram(s) IV Push once  dextrose 50% Injectable 12.5 Gram(s) IV Push once  dextrose 50% Injectable 25 Gram(s) IV Push once  dextrose 50% Injectable 25 Gram(s) IV Push once  droxidopa 600 milliGRAM(s) Oral every 8 hours  epoetin odalis (EPOGEN) Injectable 92401 Unit(s) IV Push <User Schedule>  ferrous    sulfate Liquid 300 milliGRAM(s) Oral daily  glucagon  Injectable 1 milliGRAM(s) IntraMuscular once  heparin   Injectable 5000 Unit(s) SubCutaneous every 8 hours  insulin lispro (ADMELOG) corrective regimen sliding scale   SubCutaneous every 6 hours  insulin NPH human recombinant 6 Unit(s) SubCutaneous every 6 hours  midodrine. 40 milliGRAM(s) Oral <User Schedule>  norepinephrine Infusion 0.05 MICROgram(s)/kG/Min IV Continuous <Continuous>  pantoprazole  Injectable 40 milliGRAM(s) IV Push two times a day  petrolatum Ophthalmic Ointment 1 Application(s) Both EYES four times a day  sodium chloride 1 Gram(s) Oral two times a day  sodium chloride 3%  Inhalation 4 milliLiter(s) Inhalation every 6 hours      PRN Meds:  acetaminophen   Oral Liquid .. 650 milliGRAM(s) Oral every 6 hours PRN  dextrose Oral Gel 15 Gram(s) Oral once PRN  diphenhydrAMINE Elixir 50 milliGRAM(s) Oral once PRN  droxidopa 600 milliGRAM(s) Oral <User Schedule> PRN  midodrine. 40 milliGRAM(s) Oral once PRN  sodium chloride 0.9% Bolus. 250 milliLiter(s) IV Bolus every 5 minutes PRN  sodium chloride 0.9% lock flush 10 milliLiter(s) IV Push every 1 hour PRN      LABS:                        8.8    15.97 )-----------( 463      ( 16 Nov 2023 02:40 )             29.6     Hgb Trend: 8.8<--, 9.0<--, 9.1<--, 9.3<--, 8.9<--  11-16    135  |  100  |  25<H>  ----------------------------<  193<H>  4.1   |  27  |  1.31<H>    Ca    8.7      16 Nov 2023 02:40  Phos  3.6     11-16  Mg     2.30     11-16    TPro  6.2  /  Alb  1.9<L>  /  TBili  <0.2  /  DBili  x   /  AST  17  /  ALT  16  /  AlkPhos  360<H>  11-16    Creatinine Trend: 1.31<--, 1.57<--, 1.33<--, 1.72<--, 1.54<--, 1.18<--    Urinalysis Basic - ( 16 Nov 2023 02:40 )    Color: x / Appearance: x / SG: x / pH: x  Gluc: 193 mg/dL / Ketone: x  / Bili: x / Urobili: x   Blood: x / Protein: x / Nitrite: x   Leuk Esterase: x / RBC: x / WBC x   Sq Epi: x / Non Sq Epi: x / Bacteria: x            MICROBIOLOGY:     RADIOLOGY:  [ ] Reviewed and interpreted by me         Interval Events: No significant events reported overnight.      HPI:  74 y/o F DM on insulin, HTN, CKD,breast CA, respiratory arrest and cardiac arrest (2018), CVA with residual weakness, aspiration PNA h/o trache, PEG, both removed presents with respiratory distress requiring intubation. Had recent admission d/c 7/22/23 for cough and respiratory distress (no intubation) thought to be i/s/o aspiration PNA s/p cefepime course; developed rash in response. Infectious w/u was negative at this time. Was discharged home, daughter and  at bedside providing history.     Reports that she was initially improving with O2 sats in the high 90s on room air, however, then became more lethargic and not herself (baseline mental status AOx3). Also had worsening shortness of breath while laying down and leg swelling. Contacted cardiologist who started her on bumex 1mg daily. Developed low sugars overnight before admission and was given juice with some food, daughter reports no coughing during episode. At around 4-5 AM developed vomiting and coughing, O2 sats dropped to 80s and EMS was called. Denies fevers/chills, dysuria or urinary frequency, chest pain, palpitations. Uses wheelchair at home however family moves her around frequently.     In ED, was on BiPAP with increased WOB and was intubated. Found to have elevated pro-BNP and CO2 of 111 on VBG. CXR with small right pleural effusion, started on vanc in the ED.  (11 Aug 2023 09:08)      Subjective: pt unable to provide any history in s/o cognitive impairment       OBJECTIVE:  ICU Vital Signs Last 24 Hrs  T(C): 37.2 (16 Nov 2023 04:00), Max: 37.4 (15 Nov 2023 06:50)  T(F): 98.9 (16 Nov 2023 04:00), Max: 99.3 (15 Nov 2023 06:50)  HR: 107 (16 Nov 2023 03:10) (89 - 128)  BP: --  BP(mean): --  ABP: 145/50 (16 Nov 2023 03:00) (86/34 - 150/56)  ABP(mean): 87 (16 Nov 2023 03:00) (53 - 94)  RR: 24 (16 Nov 2023 03:00) (9 - 24)  SpO2: 100% (16 Nov 2023 03:10) (94% - 100%)    O2 Parameters below as of 16 Nov 2023 04:00  Patient On (Oxygen Delivery Method): ventilator    O2 Concentration (%): 40      Mode: AC/ CMV (Assist Control/ Continuous Mandatory Ventilation), RR (machine): 24, FiO2: 40, PEEP: 8, ITime: 0.8, MAP: 19, PC: 35, PIP: 38    11-14 @ 07:01  -  11-15 @ 07:00  --------------------------------------------------------  IN: 480 mL / OUT: 400 mL / NET: 80 mL    11-15 @ 07:01  -  11-16 @ 06:15  --------------------------------------------------------  IN: 1599.7 mL / OUT: 3100 mL / NET: -1500.3 mL      CAPILLARY BLOOD GLUCOSE      POCT Blood Glucose.: 204 mg/dL (16 Nov 2023 05:11)    Physical Exam:   Constitutional: NAD, well-groomed, well-developed  HEENT: PERRLA, EOMI, no drainage or redness  Neck: supple,  No JVD, Trachea midline  Back: Normal spine flexure, No CVA tenderness, No deformity or limitation of movement  Respiratory: Breath Sounds diminished bilaterally to auscultation, no accessory muscle use noted  Cardiovascular: Regular rate, regular rhythm, normal S1, S2; no murmurs or rub  Gastrointestinal: Soft, non-tender, non distended, no hepatosplenomegaly, normal bowel sounds  Extremities: 3+ peripheral edema, no cyanosis, no clubbing   Vascular: Equal and normal pulses: 2+ peripheral pulses throughout  Neurological: opens eyes spontaneously, sometimes shakes her head, no sensory, motor  deficits, normal reflexes  Psychiatric: calm, normal mood, normal affect  Musculoskeletal: No joint swelling or deformity; no limitation of movement  Skin: warm, dry, well perfused, no rashes          HOSPITAL MEDICATIONS:  Standing Meds:  albuterol/ipratropium for Nebulization 3 milliLiter(s) Nebulizer every 6 hours  artificial tears (preservative free) Ophthalmic Solution 1 Drop(s) Both EYES every 4 hours  aspirin  chewable 81 milliGRAM(s) Enteral Tube daily  atorvastatin 10 milliGRAM(s) Oral at bedtime  calamine/zinc oxide Lotion 1 Application(s) Topical daily  chlorhexidine 0.12% Liquid 15 milliLiter(s) Oral Mucosa every 12 hours  chlorhexidine 2% Cloths 1 Application(s) Topical daily  dextrose 5%. 1000 milliLiter(s) IV Continuous <Continuous>  dextrose 5%. 1000 milliLiter(s) IV Continuous <Continuous>  dextrose 50% Injectable 25 Gram(s) IV Push once  dextrose 50% Injectable 12.5 Gram(s) IV Push once  dextrose 50% Injectable 25 Gram(s) IV Push once  dextrose 50% Injectable 25 Gram(s) IV Push once  droxidopa 600 milliGRAM(s) Oral every 8 hours  epoetin odalis (EPOGEN) Injectable 24031 Unit(s) IV Push <User Schedule>  ferrous    sulfate Liquid 300 milliGRAM(s) Oral daily  glucagon  Injectable 1 milliGRAM(s) IntraMuscular once  heparin   Injectable 5000 Unit(s) SubCutaneous every 8 hours  insulin lispro (ADMELOG) corrective regimen sliding scale   SubCutaneous every 6 hours  insulin NPH human recombinant 6 Unit(s) SubCutaneous every 6 hours  midodrine. 40 milliGRAM(s) Oral <User Schedule>  norepinephrine Infusion 0.05 MICROgram(s)/kG/Min IV Continuous <Continuous>  pantoprazole  Injectable 40 milliGRAM(s) IV Push two times a day  petrolatum Ophthalmic Ointment 1 Application(s) Both EYES four times a day  sodium chloride 1 Gram(s) Oral two times a day  sodium chloride 3%  Inhalation 4 milliLiter(s) Inhalation every 6 hours      PRN Meds:  acetaminophen   Oral Liquid .. 650 milliGRAM(s) Oral every 6 hours PRN  dextrose Oral Gel 15 Gram(s) Oral once PRN  diphenhydrAMINE Elixir 50 milliGRAM(s) Oral once PRN  droxidopa 600 milliGRAM(s) Oral <User Schedule> PRN  midodrine. 40 milliGRAM(s) Oral once PRN  sodium chloride 0.9% Bolus. 250 milliLiter(s) IV Bolus every 5 minutes PRN  sodium chloride 0.9% lock flush 10 milliLiter(s) IV Push every 1 hour PRN      LABS:                        8.8    15.97 )-----------( 463      ( 16 Nov 2023 02:40 )             29.6     Hgb Trend: 8.8<--, 9.0<--, 9.1<--, 9.3<--, 8.9<--  11-16    135  |  100  |  25<H>  ----------------------------<  193<H>  4.1   |  27  |  1.31<H>    Ca    8.7      16 Nov 2023 02:40  Phos  3.6     11-16  Mg     2.30     11-16    TPro  6.2  /  Alb  1.9<L>  /  TBili  <0.2  /  DBili  x   /  AST  17  /  ALT  16  /  AlkPhos  360<H>  11-16    Creatinine Trend: 1.31<--, 1.57<--, 1.33<--, 1.72<--, 1.54<--, 1.18<--    Urinalysis Basic - ( 16 Nov 2023 02:40 )    Color: x / Appearance: x / SG: x / pH: x  Gluc: 193 mg/dL / Ketone: x  / Bili: x / Urobili: x   Blood: x / Protein: x / Nitrite: x   Leuk Esterase: x / RBC: x / WBC x   Sq Epi: x / Non Sq Epi: x / Bacteria: x            MICROBIOLOGY:     RADIOLOGY:  [ ] Reviewed and interpreted by me

## 2023-11-17 NOTE — PROGRESS NOTE ADULT - ASSESSMENT
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 Last dialyzed Wednesday, due today     (2)CV- tenuous hemodynamics - intermittently requiring pressor gtt/on q6h Midodrine     (3)Pulm- trach/vent-dependent    (4)Anemia- on Epogen with HD    (5)GOC - s/p repeated discussions with family regarding GOC - family persisting with interest in aggressive measures.     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 Last dialyzed Wednesday, due today     (2)CV- tenuous hemodynamics - intermittently requiring pressor gtt/on q6h Midodrine     (3)Pulm- trach/vent-dependent    (4)Anemia- on Epogen with HD    (5)GOC - s/p repeated discussions with family regarding GOC - family persisting with interest in aggressive measures.     RECOMMEND:  (1)HD today - 2.4kg UF as able; Epogen with HD  (2)Continued discussions with family regarding GOC    Nilda Espinoza DNP  [facility]  [phone]       RENAL ATTENDING NOTE  Patient seen and examined with NP. Agree with assessment and plan as above.  TRISHA odom now in appropriate position based on CXR - we can proceed with HD today    Jimmy Colon MD  [facility]  [phone]

## 2023-11-17 NOTE — PROGRESS NOTE ADULT - SUBJECTIVE AND OBJECTIVE BOX
S:  Pt seen and examined. Neurologically unchanged, s/p bronchoscopy      Medications: MEDICATIONS  (STANDING):  albuterol/ipratropium for Nebulization 3 milliLiter(s) Nebulizer every 6 hours  artificial tears (preservative free) Ophthalmic Solution 1 Drop(s) Both EYES every 4 hours  aspirin  chewable 81 milliGRAM(s) Enteral Tube daily  atorvastatin 10 milliGRAM(s) Oral at bedtime  calamine/zinc oxide Lotion 1 Application(s) Topical daily  chlorhexidine 0.12% Liquid 15 milliLiter(s) Oral Mucosa every 12 hours  chlorhexidine 2% Cloths 1 Application(s) Topical daily  chlorhexidine 4% Liquid 1 Application(s) Topical <User Schedule>  dextrose 5%. 1000 milliLiter(s) (50 mL/Hr) IV Continuous <Continuous>  dextrose 5%. 1000 milliLiter(s) (100 mL/Hr) IV Continuous <Continuous>  dextrose 50% Injectable 25 Gram(s) IV Push once  dextrose 50% Injectable 12.5 Gram(s) IV Push once  dextrose 50% Injectable 25 Gram(s) IV Push once  dextrose 50% Injectable 25 Gram(s) IV Push once  droxidopa 600 milliGRAM(s) Oral every 8 hours  epoetin odalis (EPOGEN) Injectable 75210 Unit(s) IV Push <User Schedule>  fentaNYL    Injectable 50 MICROGram(s) IV Push once  ferrous    sulfate Liquid 300 milliGRAM(s) Oral daily  glucagon  Injectable 1 milliGRAM(s) IntraMuscular once  heparin   Injectable 5000 Unit(s) SubCutaneous every 8 hours  insulin lispro (ADMELOG) corrective regimen sliding scale   SubCutaneous every 6 hours  insulin NPH human recombinant 6 Unit(s) SubCutaneous every 6 hours  midodrine. 40 milliGRAM(s) Oral <User Schedule>  norepinephrine Infusion 0.05 MICROgram(s)/kG/Min (6.23 mL/Hr) IV Continuous <Continuous>  pantoprazole  Injectable 40 milliGRAM(s) IV Push two times a day  petrolatum Ophthalmic Ointment 1 Application(s) Both EYES four times a day  sodium chloride 1 Gram(s) Oral two times a day  sodium chloride 3%  Inhalation 4 milliLiter(s) Inhalation every 6 hours    MEDICATIONS  (PRN):  acetaminophen   Oral Liquid .. 650 milliGRAM(s) Oral every 6 hours PRN Temp greater or equal to 38C (100.4F), Mild Pain (1 - 3)  dextrose Oral Gel 15 Gram(s) Oral once PRN Blood Glucose LESS THAN 70 milliGRAM(s)/deciliter  diphenhydrAMINE Elixir 50 milliGRAM(s) Oral once PRN Rash and/or Itching  droxidopa 600 milliGRAM(s) Oral <User Schedule> PRN prior to hemodialysis  midodrine. 40 milliGRAM(s) Oral once PRN 1 Hour Pre HD  sodium chloride 0.9% Bolus. 250 milliLiter(s) IV Bolus every 5 minutes PRN SBP LESS THAN or EQUAL to 90 mmHg  sodium chloride 0.9% lock flush 10 milliLiter(s) IV Push every 1 hour PRN Pre/post blood products, medications, blood draw, and to maintain line patency       Vitals:  Vital Signs Last 24 Hrs  T(C): 37.1 (17 Nov 2023 04:00), Max: 37.3 (17 Nov 2023 00:00)  T(F): 98.8 (17 Nov 2023 04:00), Max: 99.1 (17 Nov 2023 00:00)  HR: 103 (17 Nov 2023 11:25) (94 - 124)  BP: --  BP(mean): --  RR: 24 (17 Nov 2023 10:00) (17 - 27)  SpO2: 98% (17 Nov 2023 11:25) (94% - 100%)    Parameters below as of 17 Nov 2023 10:10  Patient On (Oxygen Delivery Method): ventilator        LABS:                          8.9    27.48 )-----------( 466      ( 17 Nov 2023 01:04 )             30.9     11-17    136  |  99  |  29<H>  ----------------------------<  175<H>  4.1   |  25  |  1.49<H>    Ca    8.8      17 Nov 2023 01:04  Phos  4.1     11-17  Mg     2.40     11-17    TPro  6.2  /  Alb  1.8<L>  /  TBili  0.2  /  DBili  x   /  AST  18  /  ALT  12  /  AlkPhos  330<H>  11-17    LIVER FUNCTIONS - ( 17 Nov 2023 01:04 )  Alb: 1.8 g/dL / Pro: 6.2 g/dL / ALK PHOS: 330 U/L / ALT: 12 U/L / AST: 18 U/L / GGT: x             Urinalysis Basic - ( 17 Nov 2023 01:04 )    Color: x / Appearance: x / SG: x / pH: x  Gluc: 175 mg/dL / Ketone: x  / Bili: x / Urobili: x   Blood: x / Protein: x / Nitrite: x   Leuk Esterase: x / RBC: x / WBC x   Sq Epi: x / Non Sq Epi: x / Bacteria: x        Neurological Exam:  Mental Status: Eyes closed, minimal spont eye opening off sedation. Does not follow commands,  + trach to vent and NGT   Cranial Nerves: PERRL slightly sluggish, R facial twitching noted by mouth lessened today- ? suppressible. occasional head dystonia  Motor: Moves upper extremities spontaneously R >L, tremor R > L  no movement noted in lowers  Sensation: WD to noxious x4    I personally reviewed the below data/images/labs:        < from: CT Head No Cont (08.15.23 @ 17:20) >    ACC: [id number] EXAM:  CT BRAIN   ORDERED BY: MINAL SAM     PROCEDURE DATE:  08/15/2023          INTERPRETATION:  Clinical indication: Change in neuro exam.    Multiple axial sections were performed from base to vertex without   contrast enhancement. Coronal and sagittal reconstructions were   reformatted well.    This exam is compared prior head CT performed on August 11, 2023    Parenchymal volume loss and chronic microvessel ischemic changes are   again seen.    Abnormal low-attenuation involving the left occipital cortical   subcortical region is again seen. 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    No evidence of acute hemorrhage mass or mass effect is seen.    Evaluation of the osseous structures with appropriate window demonstrates   sclerotic changes about the left mastoid region which appears stable.   Opacification left middle ear region is again seen.    Patient is status post bilateral cataract surgery.    Impression: Stable exam.    < end of copied text >    vEEG:    Clinical Impression:  - Severe diffuse non-specific cerebral dysfunction  - There were no epileptiform abnormalities or seizures recorded.        CTH 8/11:    VENTRICLES AND SULCI: Age-appropriate involutional change  INTRA-AXIAL:  Old left PCA infarct as seen on the prior unchanged.   Microvascular ischemic changes involving the periventricular and   subcortical white matter as seen previously  EXTRA-AXIAL:  No mass or collection is seen.  VISUALIZED SINUSES:  Clear.  VISUALIZED MASTOIDS: Left mastoid sclerosis  CALVARIUM: Infiltrative appearance tothe calvarium may be indicative of   marrow infiltration on the basis of patient's known diagnosis of breast   cancer. MISCELLANEOUS:  None.    IMPRESSION:  No significant interval change compared with 7/17/2023 in   left PCA infarct which is old. Microvascular ischemic changes involving   the periventricular and subcortical white matter as seen   previously.Questionable lesions at the level of the calvarium related to   possible breast CA. Clinical correlation recommended.    --- End of Report ---      ct< from: CT Head No Cont (09.26.23 @ 21:02) >  IMPRESSION: Vague questionable areas of hypoattenuation within the   bilateral cerebellar hemispheres which may be compatible with   acute/subacute ischemia. Recommend further evaluation with a brain MRI   study, provided there are no MRI contraindications.    No acute intracranial hemorrhage.    Previously seen questionable vague wedge-shaped area of hypoattenuation   in the left parietal lobe with artifactual secondary to motion and volume   averaging with a prominent sulcus.    Chronic left occipital lobe infarct.    < end of copied text >    < from: CT Head No Cont (10.03.23 @ 20:46) >    IMPRESSION:    No acute intracranial hemorrhage or mass effect. Evaluation of the   posterior fossa degraded by streak artifact from dental amalgam.   Lucencies in the bilateral cerebellum may be artifactual.    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    Bilateral middle ear and mastoid effusions which are also seen on prior   exam.    EEG Classification / Summary:  Abnormal EEG in the awake, drowsy states.   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   -No seizures

## 2023-11-17 NOTE — PROGRESS NOTE ADULT - ASSESSMENT
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       NEUROLOGY  #AMS  # Multiple embolic strokes   # L PCA Infarct   MR head performed w/ new infarct and old infarcts, EEG without seizure events but with high foci potential   - EEG given facial twitching: negative for epileptiform activity (10/31)  - deferring repeat MRI as unlikely to   - baseline  AOX3 at home per daughter, currently spontaneously opening eyes, localized to voice and pain, trached/non-verbal   - Keppra stopped 11/10 (was on as seizure ppx given multiple infarcts, no seizures on multiple EEG, stopped to ensure it is not contributing to sedation), the same mental status, -- opens eyes spontaneously, and shakes her head intermittently, not following commands    - aspirin held 10/11, likely in s/o GIB, Hgb stable not requiring PRBC since 11/4, ASA 81 mg restarted on 11/14;   - continue Lipitor    CARDIOVASCULAR  # Septic vs vasoplegic shock   Etiology likely septic iso active infection. Possible component of vasoplegic, however no longer with active infection or on sedation. Off IV vasopressors.   Current regimen:   - droxidopa 600mg q8h with PRN 600mg ordered preHD  - midodrine 40mg q6 h; trial of additional 40mg prior to HD  - required levophed up to 0.14 during HD 11/15  weaned off after dialysis session, will need additional midodrine/droxidopa dose preHD    # HFpEF w/ decompensation   > ECHO w/ EF 59 with severe LVH and diastolic dysfunction   - fluid overloaded, + 26 L, HD to remove fluids     HEENT   # Left ear OE with acute on chronic left-sided otomastoiditis, s/p Abx (see in ID)   - noted to have white discharged/residue, increased from prior per patient's daughter; ENT reconsulted, resumed on cipro drops (10/31 - 11/7),   # 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   - resolved    RESPIRATORY  # AHRF second to volume overload   # Copious inline and oral thick tanned secretions   - CKD vs HFpEF w/ hypercarbia 2/2 volume overload requiring intubation, trach, and HD initiation  - Continue on albuterol and chest PT, cont 3 % INH, added IPV,   - Continue volume removal with HD  - hypoxemia w/ turning and repositioning, CXR 11/16 shows modereate right pleural effusion, worsening RUL atelectasis, left has small effusion and/or atelectasis. Plan for flexible bronchoscopy and possible thoracentesis  - CXR 11/16 shows moderate right pleural effusion with suggestion of worsening RUL atelectasis, small left effusion and/or atelectasis.  - Bronchoscopy bedside 11/16 noted with white secretions right > left, BAL sent     #tracheomalacia   - CT CHEST (9/8) with tracheomalacia, BL pleural effusions and persistent consolidations     GI  # GIB: last pRBC transfused 11/4 (10/14 before that)   - Continue on PPI BID  - patient had melena before 11/13 but H/H stable,  Eliquis was held for dark brown/black stool  - as per nursing staff, stool is black 100 in the rectal system in 24 hrs     # Dysphagia   - NGT- tolerating feeds, at goal- changed to nepro was per nutrition    RENAL  # ESRD, L IJ ilene   - HD MWF TIW with midodrine and droxidopa   - ICU for vasopressor assisted volume removal, cap to 1 pressor and not offering CRRT due to the comorbidities and prognosis   - f/u w/ nephrology: will continue to offer patient 1-pressor assisted HD as agreed prior, as long as she can tolerate HD maxed on 1 pressor, Levo to 0.3; she will be considered iHD candidate  - UF -2.2 L w/ HD (11/13); - required levophed with HD 11/13 up to 0.05, and is been off pressors since 11/13, 16:00; today with HD requiring pressors __ Levophed 0.14, for fluid removal. and down to 0.08, 2.5 L of fluids removed  - IR was planning PermCath 11/14; but procedure was cancelled due to pt in MICU, on vasopressors, and has leucocytosis  - TRISHA odom discontinued 11/16 for line holiday, plan for new shiley (TRIALYSIS) placement in AM, scheduled for HD 11/17      INFECTIOUS DISEASE  #Septic shock  T max 98.2, WBC - 13>14>15>16>18, check LA with am labs  #blood cx 9/1: MSSA with hypotension  repeat negative 9/4, 9/8 s/p 4 weeks of vanco/dapto through 10/2  #Colonized with  pseudomonas   # MSSA/ESBL E Coli PNA  # L otitis Externa s/p ENT debridement c/b Candida, s/p Meropenem and Fluconazole  # Proteus/ Pseudomonas in sputum, s/p Aztreonam   -recent Bcx 11/2 negative, sputum 11/2 with  pseudomonas- colonized   - repeat MRSA PCR neg  - afebrile now after completing course aztreonam 11/6  - monitoring off abx   - f/u BAL (11/16)    # possible malignant otitis externa   # ESBL ECOLI and MSSA VAP c/b MSSA bacteremia   - hx of MSSA bacteremia, ESBL E. Coli sputum Cx, BAL w/ MSSA  - treated with abx   - eosinophilia workup: JORGE, ANCAs negative    HEME  # Anemia second to GIB vs renal disease   - multiple transfusions, last done 11/4  - Eliquis held 2/2 pt having melena, and now pt with black stools/dark brown  - 11/14  restarted ASA 81 mg,   - restarted Heparin SQ prophylaxis 11/14 today,  - consider restarting AC tomorrow after shiley/line placement if no signs of bleeding and stable    #Allergic transfusion reaction: per RCU documentation, developed erythematous rash across chest shortly after starting transfusion, blood bank consulted and diagnosed mild allergic rxn  - premedicate w/ benadryl and famotidine prior to future transfusions  - no issue w/ pRBC transfusion 11/4    VASCULAR   # LLE POP DVT   - on Eliquis (held as of 11/11 PM for procedure and also patient having melena), stools are black/dark brown  - restarted Heparin SQ prophylaxis 11/14 and Eliquis tomorrow if no bleeding and counts stable   - SCDs,     # HD access  - [facility] SHILEY (9/27 - 10/21)  - LIJ shiley replaced for HD (10/24 -11/15 ), >> L IJ shiley was removed today after HD and new line will be placed when pt need next HD session, will need Trialysis Shiley line, which is at the bedside and consent obtained from the family   - Permacath was planned on 11/14, but cancelled 2/2 pt in MICU, requiring pressors, and + leucocytosis     ONC   # Hx of Breast CA   - Patient dx in 2018 and s/p B/L mastectomy (radical on right) and chemo and RT.     ENDOCRINE  # IDDM2   - Continued on NPH with ISS, however noted with hypoglycemic episodes and NPH dc'ed and restarted   - -- >98>98__   Continue on NPH 6U with moderate ISS.   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       NEUROLOGY  #AMS  # Multiple embolic strokes   # L PCA Infarct   MR head performed w/ new infarct and old infarcts, EEG without seizure events but with high foci potential   - EEG given facial twitching: negative for epileptiform activity (10/31)  - deferring repeat MRI as unlikely to   - baseline  AOX3 at home per daughter, currently spontaneously opening eyes, localized to voice and pain, trached/non-verbal   - Keppra stopped 11/10 (was on as seizure ppx given multiple infarcts, no seizures on multiple EEG, stopped to ensure it is not contributing to sedation), the same mental status, -- opens eyes spontaneously, and shakes her head intermittently, not following commands    - aspirin held 10/11, likely in s/o GIB, Hgb stable not requiring PRBC since 11/4, ASA 81 mg restarted on 11/14  - continue Lipitor    CARDIOVASCULAR  # Septic vs vasoplegic shock   Etiology likely septic iso active infection. Possible component of vasoplegic, however no longer with active infection or on sedation. Off IV vasopressors.   Current regimen:   - droxidopa 600mg q8h with PRN 600mg ordered preHD  - midodrine 40mg q6 h; trial of additional 40mg prior to HD  - required levophed up to 0.14 during HD 11/15  weaned off after dialysis session, will need additional midodrine/droxidopa dose preHD    # HFpEF w/ decompensation   > ECHO w/ EF 59 with severe LVH and diastolic dysfunction   - fluid overloaded, HD to remove fluids     HEENT   # Left ear OE with acute on chronic left-sided otomastoiditis, s/p Abx (see in ID)   - noted to have white discharged/residue, increased from prior per patient's daughter; ENT reconsulted, resumed on cipro drops (10/31 - 11/7),   # 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   - resolved    RESPIRATORY  # AHRF second to volume overload   # Copious inline and oral thick tanned secretions   - CKD vs HFpEF w/ hypercarbia 2/2 volume overload requiring intubation, trach, and HD initiation  - Continue on albuterol and chest PT, cont 3 % INH, added IPV  - Continue volume removal with HD  - hypoxemia w/ turning and repositioning, CXR 11/16 shows moderate right pleural effusion, worsening RUL atelectasis, left has small effusion and/or atelectasis. Plan for flexible bronchoscopy and possible thoracentesis  - CXR 11/16 shows moderate right pleural effusion with suggestion of worsening RUL atelectasis, small left effusion and/or atelectasis.  - Bronchoscopy bedside 11/16 noted with white secretions right > left, BAL sent     #tracheomalacia   - CT CHEST (9/8) with tracheomalacia, BL pleural effusions and persistent consolidations     GI  # GIB: last pRBC transfused 11/4 (10/14 before that)   - Continue on PPI BID  - patient had melena before 11/13 but H/H stable,  Eliquis was held for dark brown/black stool  - as per nursing staff, stool is black 100 in the rectal system in 24 hrs     # Dysphagia   - NGT- tolerating feeds, at goal- changed to nepro was per nutrition    RENAL  # ESRD, L ARTHUR odom   - HD MWF TIW with midodrine and droxidopa   - ICU for vasopressor assisted volume removal, cap to 1 pressor and not offering CRRT due to the comorbidities and prognosis   - f/u w/ nephrology: will continue to offer patient 1-pressor assisted HD as agreed prior, as long as she can tolerate HD maxed on 1 pressor, Levo to 0.3; she will be considered iHD candidate  - IR was planning PermCath 11/14; but procedure was cancelled due to pt in MICU, on vasopressors, and has leucocytosis  - TRISHA odom discontinued 11/16 for line holiday, trialysis catheter placed in [facility] on 11/17   - plan for HD today      INFECTIOUS DISEASE  #Septic shock  , afebrile,  WBC - 15>27, LA wnls  #blood cx 9/1: MSSA with hypotension  repeat negative 9/4, 9/8 s/p 4 weeks of vanco/dapto through 10/2  #Colonized with  pseudomonas   # MSSA/ESBL E Coli PNA  # L otitis Externa s/p ENT debridement c/b Candida, s/p Meropenem and Fluconazole  # Proteus/ Pseudomonas in sputum, s/p Aztreonam   - recent Bcx 11/2 negative, sputum 11/2 with  pseudomonas- colonized   - repeat MRSA PCR neg  - afebrile now after completing course aztreonam 11/6  - monitoring off abx   - f/u BAL (11/16)- only afb was sent which is negative. send tracheal cx today 11/17.    # possible malignant otitis externa   # ESBL ECOLI and MSSA VAP c/b MSSA bacteremia   - hx of MSSA bacteremia, ESBL E. Coli sputum Cx, BAL w/ MSSA  - treated with abx   - eosinophilia workup: JORGE, ANCAs negative    HEME  # Anemia second to GIB vs renal disease   - multiple transfusions, last done 11/4  - Eliquis held 2/2 pt having melena, and now pt with black stools/dark brown  - 11/14  restarted ASA 81 mg   - restarted Heparin SQ prophylaxis 11/14 today, will continue to hold off starting ac given melena.    #Allergic transfusion reaction: per RCU documentation, developed erythematous rash across chest shortly after starting transfusion, blood bank consulted and diagnosed mild allergic rxn  - premedicate w/ benadryl and famotidine prior to future transfusions  - no issue w/ pRBC transfusion 11/4    VASCULAR   # LLE POP DVT   - on Eliquis (held as of 11/11 PM for procedure and also patient having melena), stools are black/dark brown  - restarted Heparin SQ prophylaxis 11/14 and will continue to hold off starting Eliquis given melena.  - SCDs    # HD access  - LIZ[facility] CLAUDIA (9/27 - 10/21)  - LIJ shiley replaced for HD (10/24 -11/15 ) L IJ shiley was removed today after HD and new line will be placed when pt need next HD session, will need Trialysis Jagrutiley line, which is at the bedside and consent obtained from the family   - Permacath was planned on 11/14, but cancelled 2/2 pt in MICU, requiring pressors, and + leucocytosis   - L LIJ Trialysis catheter placed 11/17    ONC   # Hx of Breast CA   - Patient dx in 2018 and s/p B/L mastectomy (radical on right) and chemo and RT.     ENDOCRINE  # IDDM2   - Continue on NPH 6U with moderate ISS   - Adjust as needed      ETHICS/ GOC    - Attempted palliative discussion however family not interested and wishes for FULL CODE  - Family continues to want everything done despite inability to tolerate HD on multiple oral pressor  - Levo capped at 0.3 during HD     11/17- updated pts spouse over the phone and daughter at bedside.  Intermediate Repair Preamble Text (Leave Blank If You Do Not Want): Undermining was performed with blunt dissection.

## 2023-11-17 NOTE — PROGRESS NOTE ADULT - ASSESSMENT
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 While in MICU patient noted with worsening renal failure requiring HD initiation, CGE/ Melena s/p EGD 8/31 found with esophagitis and erosive gastropathy, new right cerebellar infarct and fevers second to ESBL COLI and MSSA VAP, MSSA bacteremia, left ear otitis externa and drug rxn to cephalosporins Course further complicated by prolonged vent time s/p tracheostomy 9/1 and transferred to RCU 9/3 completed by recurrent fevers with high PIP, respiratory acidosis and concern for volume overloaded.   CTH repeated 9/26 for concern for encephalopathy - has known now - chronic bilateral cerebellar infarcts, L PCA infarct. L parietal hypodensity seen on prior CT likely artifactual  Repeat CTH 10/3 - unchanged  EEG 10/5 with L posterocentral/temporal spikes/sharp waves - doubt true focal seizure upon further review of EEG     Impression:   Suspect underlying movement d/o - PD?  Metabolic encephalopathy related to shock, infection, bihemispheric infarcts  Multiple embolic strokes s/p ILR without events  Dementia   MSSA bacteremia, s/p Daptomycin  recurrent L otitis externa  Acute popliteal DVT on Eliquis   Anemia     Recommendations   [] care per MICU for persistent hypotension, sepsis  [] Abx per ID   [] has multiple areas of epileptogenic potential on EEG, no clear seizure correlate seen.  Repeat EEG for R facial twitching negative.   [] would stop Keppra to see if mental status improves, doubt true seizures -> now off with more eye openin  [] consider MRI brain once stable but doubt will change mgmt  [] mgmt of hypotension per primary team, remains full code  [] Abx per ID  [] C/w home Atorvastatin 10 mg qhs   [] continue to address GOC with family, poor prognosis    Katty Charles DO  Vascular Neurology  Office [phone]

## 2023-11-17 NOTE — PROCEDURE NOTE - NSCVLATTEMPTSITEVASC_A_CORE
left/internal jugular
left/internal jugular
right/internal jugular
left/internal jugular
left/internal jugular

## 2023-11-17 NOTE — PROGRESS NOTE ADULT - ASSESSMENT
72 y/o F well known to me from my [facility] out\[facility]\"" practice. she was admitted at [facility] 7/12-7/22 w aspiration PNA, was treated w CEFEPIME, developed an allergic rash,  dCHF, + MAC on AFB culture, had been progressively getting more and more lethargic and dyspneic at home since DC.   In  am of 8/11/23  ptn presented with respiratory distress w hypoxia and hypercarbia requiring intubation 2/2 volume overload +/- Asp PNA      Neuro   not responsive  Baseline MS AOx3, aphasic   - h/o CVA , on aspirin and statin . resumed w feeding tube, ASA resumed 8/26  - eeg  2/2 tremors, no sz focus, right facial twitching, EEG has been negative. 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 probably embolic in nature, did not tolerate full AC in the past, STEPHANIE is neg , no shunts observed  - ptn is poorly reactive, rpt scan done, no further CVA seen.     Cardiac   cardiology following  CHFpEF   TTE 7/2023 with EF 59%, with severe LVH and diastolic dysfunction   off Levo drip , now only during HD  on midodrine 40 mg qid, droxidopa 600 tid, additional 200 mg prior to HD,     Pulmonary   Acute hypercapnic and hypoxemic respiratory failure   prolonged intubation, now  trache to  vent, has copious secretions      Renal   on HD via L IJ Shiley, tunneled catheter when clinically stable  HD MWF, midodrine and pressor assisted,   permacath  scheduled to be place in IR on 11/14 but cancelled       GI  NPO  on tube feeds  UGI bleed 8/31,  EGD/Colonoscopy: erosive gastropathy, esophagitis on colonoscopy old blood seen no active bleeding, poor prep  NGT-TF, Nepro  s/p 2UPRBC 11/4    Endocrine  on Insulin: NPH, Admelog    ID   complicated infectious hospital course  treated for MSSA bacteremia, aspiration PNA.  sputum + for pseudomonas, ptn was initially on zosyn but was allergic, then was switched to aztreonam   Aztreonam was switched to polymyxin for a possible allergy but ptn didnt have a reaction to aztreonam, she had a reaction to Zosyn.   spike temps again while off Abx, Aztreonam resumed 10/17, DCed 10/29  tmax 104.6 on 11/2-11/5, Cx NTD  on 11/16 had bronch w BAL. on 11/17: worsening leukocytosis up tp 27. 5K, CXR w no new infiltrates, afebrile.  L IJ shiley needed to be readjusted. ID Dr. Calderon who knows the ptn well was notified. would rpt cbc, if still elevated would probably start Aztreonam.       ID course complicated with multiple ABx allergies  left eye treated for presumed HSV w Valtrex    ENT: recurrent Left O. externa resolved    Heme/Onc  on eliquis for  LEFT POPLITEAL VEIN ACUTE DVT , on ELiquis    Ethics  GOC - Discussed GOC with daughter and , they have opted in the past for full code. and she remains full code at present

## 2023-11-17 NOTE — PROGRESS NOTE ADULT - SUBJECTIVE AND OBJECTIVE BOX
Subjective: Patient seen and examined. No new events except as noted.   remains in ICU   s/p bronchoscopy     REVIEW OF SYSTEMS:    Unable to obtain      MEDICATIONS:  MEDICATIONS  (STANDING):  albuterol/ipratropium for Nebulization 3 milliLiter(s) Nebulizer every 6 hours  artificial tears (preservative free) Ophthalmic Solution 1 Drop(s) Both EYES every 4 hours  aspirin  chewable 81 milliGRAM(s) Enteral Tube daily  atorvastatin 10 milliGRAM(s) Oral at bedtime  calamine/zinc oxide Lotion 1 Application(s) Topical daily  chlorhexidine 0.12% Liquid 15 milliLiter(s) Oral Mucosa every 12 hours  chlorhexidine 2% Cloths 1 Application(s) Topical daily  dextrose 5%. 1000 milliLiter(s) (50 mL/Hr) IV Continuous <Continuous>  dextrose 5%. 1000 milliLiter(s) (100 mL/Hr) IV Continuous <Continuous>  dextrose 50% Injectable 25 Gram(s) IV Push once  dextrose 50% Injectable 12.5 Gram(s) IV Push once  dextrose 50% Injectable 25 Gram(s) IV Push once  dextrose 50% Injectable 25 Gram(s) IV Push once  droxidopa 600 milliGRAM(s) Oral every 8 hours  epoetin odalis (EPOGEN) Injectable 67924 Unit(s) IV Push <User Schedule>  fentaNYL    Injectable 50 MICROGram(s) IV Push once  ferrous    sulfate Liquid 300 milliGRAM(s) Oral daily  glucagon  Injectable 1 milliGRAM(s) IntraMuscular once  heparin   Injectable 5000 Unit(s) SubCutaneous every 8 hours  insulin lispro (ADMELOG) corrective regimen sliding scale   SubCutaneous every 6 hours  insulin NPH human recombinant 6 Unit(s) SubCutaneous every 6 hours  midodrine. 40 milliGRAM(s) Oral <User Schedule>  norepinephrine Infusion 0.05 MICROgram(s)/kG/Min (6.23 mL/Hr) IV Continuous <Continuous>  pantoprazole  Injectable 40 milliGRAM(s) IV Push two times a day  petrolatum Ophthalmic Ointment 1 Application(s) Both EYES four times a day  sodium chloride 1 Gram(s) Oral two times a day  sodium chloride 3%  Inhalation 4 milliLiter(s) Inhalation every 6 hours      PHYSICAL EXAM:  T(C): 37.1 (11-17-23 @ 04:00), Max: 37.3 (11-17-23 @ 00:00)  HR: 99 (11-17-23 @ 08:00) (95 - 126)  BP: --  RR: 24 (11-17-23 @ 08:00) (24 - 27)  SpO2: 100% (11-17-23 @ 08:00) (94% - 100%)  Wt(kg): --  I&O's Summary    16 Nov 2023 07:01  -  17 Nov 2023 07:00  --------------------------------------------------------  IN: 870 mL / OUT: 250 mL / NET: 620 mL    17 Nov 2023 07:01  -  17 Nov 2023 09:58  --------------------------------------------------------  IN: 90 mL / OUT: 0 mL / NET: 90 mL          Appearance: NAD, +trach  +NGT  HEENT: dry oral mucosa  Lymphatic: No lymphadenopathy  Cardiovascular: Normal S1 S2, No JVD, No murmurs, No edema  Respiratory: Decreased BS, + trach to vent	  Neuro: opens eyes  Gastrointestinal: Soft, Non-tender, + BS, + NGT  Skin: No rashes, No ecchymoses, No cyanosis	  Extremities: No strength/ROM 2/2 sedation, + BL LE edema  Vascular: Peripheral pulses palpable 2+ bilaterally  Anasarca     Mode: AC/ CMV (Assist Control/ Continuous Mandatory Ventilation), RR (machine): 24, FiO2: 40, PEEP: 8, ITime: 0.8, MAP: 19, PC: 35, PIP: 38          LABS:    CARDIAC MARKERS:                                8.9    27.48 )-----------( 466      ( 17 Nov 2023 01:04 )             30.9     11-17    136  |  99  |  29<H>  ----------------------------<  175<H>  4.1   |  25  |  1.49<H>    Ca    8.8      17 Nov 2023 01:04  Phos  4.1     11-17  Mg     2.40     11-17    TPro  6.2  /  Alb  1.8<L>  /  TBili  0.2  /  DBili  x   /  AST  18  /  ALT  12  /  AlkPhos  330<H>  11-17    proBNP:   Lipid Profile:   HgA1c:   TSH:             TELEMETRY: 	SR ST     ECG:  	  RADIOLOGY: · Procedure Details	  Bronchoscope inserted through tracheostomy to level of ines.  Airways inspected.  Therapeutic aspiration of secretions mostly from RML and RLL.  BAL done of RML with 15 cc of NS.  Bronch removed from trach. Patient tolerated procedure well.    · Findings	  Tracheostomy in appropriate position.  Moderate amount of white secretions in R mainstem.   Scant secretions in L.  No endobronchial lesions, no bleeding noted.      · Specimens	Gram Stain and Culture  DIAGNOSTIC TESTING:  [ ] Echocardiogram:  [ ]  Catheterization:  [ ] Stress Test:    OTHER:

## 2023-11-18 NOTE — PROGRESS NOTE ADULT - SUBJECTIVE AND OBJECTIVE BOX
Follow Up: Leukocytosis     Interval History: Examined at bedside     REVIEW OF SYSTEMS  Trach     Allergies  isoniazid (Rash)  nafcillin (Unknown)  hydrALAZINE (Rash)  vitamin E (Short breath; Urticaria; Hives)  doxycycline (Rash)  cefepime (Rash)  NIFEdipine (Urticaria; Hives)  Zosyn (Rash)    ANTIMICROBIALS:      OTHER MEDS: MEDICATIONS  (STANDING):  acetaminophen   Oral Liquid .. 650 every 6 hours PRN  albuterol/ipratropium for Nebulization 3 every 6 hours  aspirin  chewable 81 daily  atorvastatin 10 at bedtime  dextrose 50% Injectable 25 once  dextrose 50% Injectable 12.5 once  dextrose 50% Injectable 25 once  dextrose 50% Injectable 25 once  dextrose Oral Gel 15 once PRN  diphenhydrAMINE Elixir 50 once PRN  droxidopa 600 <User Schedule> PRN  droxidopa 600 every 8 hours  epoetin odalis (EPOGEN) Injectable 35198 <User Schedule>  glucagon  Injectable 1 once  heparin   Injectable 5000 every 8 hours  insulin lispro (ADMELOG) corrective regimen sliding scale  every 6 hours  insulin NPH human recombinant 6 every 6 hours  midodrine. 40 once PRN  midodrine. 40 <User Schedule>  norepinephrine Infusion 0.05 <Continuous>  pantoprazole  Injectable 40 two times a day  sodium chloride 3%  Inhalation 4 every 6 hours    Vital Signs Last 24 Hrs  T(F): 99.1 (11-18-23 @ 08:00), Max: 100 (11-16-23 @ 08:00)    Vital Signs Last 24 Hrs  HR: 102 (11-18-23 @ 08:00) (94 - 120)  BP: --  RR: 24 (11-18-23 @ 08:00)  SpO2: 100% (11-18-23 @ 08:00) (94% - 100%)  Wt(kg): --    EXAM:  General: Patient in no acute distress   HEENT:  Trach   Shiley   NGT   CV: S1+S2  Lungs: Trach to Vent   Crackles b/l   Abd: No guarding   Ext: No cyanosis  Neuro: Calm   Skin: No rash     Labs:                        8.3    17.62 )-----------( 352      ( 18 Nov 2023 03:50 )             27.7     11-18    137  |  100  |  29<H>  ----------------------------<  99  4.1   |  26  |  1.53<H>    Ca    8.7      18 Nov 2023 03:50  Phos  3.7     11-18  Mg     2.20     11-18    TPro  6.0  /  Alb  1.8<L>  /  TBili  <0.2  /  DBili  x   /  AST  20  /  ALT  12  /  AlkPhos  304<H>  11-18    WBC Trend:  WBC Count: 17.62 (11-18-23 @ 03:50)  WBC Count: 27.48 (11-17-23 @ 01:04)  WBC Count: 15.97 (11-16-23 @ 02:40)  WBC Count: 18.02 (11-15-23 @ 03:05)    Creatine Trend:  Creatinine: 1.53 (11-18)  Creatinine: 1.49 (11-17)  Creatinine: 1.31 (11-16)  Creatinine: 1.57 (11-15)    Liver Biochemical Testing Trend:  Alanine Aminotransferase (ALT/SGPT): 12 (11-18)  Alanine Aminotransferase (ALT/SGPT): 12 (11-17)  Alanine Aminotransferase (ALT/SGPT): 16 (11-16)  Alanine Aminotransferase (ALT/SGPT): 17 (11-15)  Alanine Aminotransferase (ALT/SGPT): 35 *H* (11-14)  Aspartate Aminotransferase (AST/SGOT): 20 (11-18-23 @ 03:50)  Aspartate Aminotransferase (AST/SGOT): 18 (11-17-23 @ 01:04)  Aspartate Aminotransferase (AST/SGOT): 17 (11-16-23 @ 02:40)  Aspartate Aminotransferase (AST/SGOT): 16 (11-15-23 @ 03:05)  Aspartate Aminotransferase (AST/SGOT): 45 (11-14-23 @ 02:53)  Bilirubin Total: <0.2 (11-18)  Bilirubin Total: 0.2 (11-17)  Bilirubin Total: <0.2 (11-16)  Bilirubin Total: <0.2 (11-15)  Bilirubin Total: 0.2 (11-14)    Trend LDH  08-24-23 @ 03:23  189    Urinalysis Basic - ( 18 Nov 2023 03:50 )    Color: x / Appearance: x / SG: x / pH: x  Gluc: 99 mg/dL / Ketone: x  / Bili: x / Urobili: x   Blood: x / Protein: x / Nitrite: x   Leuk Esterase: x / RBC: x / WBC x   Sq Epi: x / Non Sq Epi: x / Bacteria: x    MICROBIOLOGY:    MRSA PCR Result.: NotDetec (11-02-23 @ 14:38)  MRSA PCR Result.: NotDetec (09-26-23 @ 18:13)  MRSA PCR Result.: NotDetec (09-10-23 @ 05:50)  MRSA PCR Result.: NotDetec (08-14-23 @ 09:50)  MRSA PCR Result.: NotDetec (08-11-23 @ 18:00)  MRSA PCR Result.: NotDetec (07-17-23 @ 15:12)    Culture - Acid Fast - Bronchial w/Smear (collected 16 Nov 2023 23:59)  Source: .Bronchial Bronchial Lavage    Culture - Sputum (collected 02 Nov 2023 10:24)  Source: ET Tube ET Tube  Final Report:    Numerous Pseudomonas aeruginosa (Carbapenem Resistant)  Organism: Pseudomonas aeruginosa (Carbapenem Resistant)  Organism: Pseudomonas aeruginosa (Carbapenem Resistant)    Sensitivities:      Method Type: CarbaR      -  Resistance Gene to Carbapenem: Nondet  Organism: Pseudomonas aeruginosa (Carbapenem Resistant)    Sensitivities:      Method Type: SIMON      -  Amikacin: S <=16      -  Aztreonam: R >16      -  Cefepime: I 16      -  Ceftazidime: I 16      -  Ceftazidime/Avibactam: S <=4      -  Ceftolozane/tazobactam: S <=2      -  Ciprofloxacin: R >2      -  Gentamicin: R >8      -  Imipenem: R >8      -  Levofloxacin: R >4      -  Meropenem: R >8      -  Piperacillin/Tazobactam: R >64      -  Tobramycin: R >8    Culture - Blood (collected 02 Nov 2023 09:10)  Source: .Blood Blood-Peripheral  Final Report:    No growth at 5 days    Culture - Blood (collected 02 Nov 2023 09:00)  Source: .Blood Blood-Peripheral  Final Report:    No growth at 5 days    Culture - Blood (collected 17 Oct 2023 07:00)  Source: .Blood Blood-Venous  Final Report:    No growth at 5 days    Culture - Blood (collected 14 Oct 2023 18:25)  Source: .Blood Blood-Venous  Final Report:    No growth at 5 days    Culture - Blood (collected 14 Oct 2023 18:25)  Source: .Blood Blood-Peripheral  Final Report:    No growth at 5 days    Culture - Sputum (collected 14 Oct 2023 05:49)  Source: Trach Asp Tracheal Aspirate  Final Report:    Numerous Pseudomonas aeruginosa (Carbapenem Resistant)    Numerous Proteus mirabilis    Normal Respiratory Cate present  Organism: Pseudomonas aeruginosa (Carbapenem Resistant)  Proteus mirabilis  Organism: Proteus mirabilis    Sensitivities:      Method Type: SIMON      -  Amikacin: S <=16      -  Amoxicillin/Clavulanic Acid: S <=8/4      -  Ampicillin: R >16 These ampicillin results predict results for amoxicillin      -  Ampicillin/Sulbactam: S 8/4      -  Aztreonam: S <=4      -  Cefazolin: S <=2      -  Cefepime: S <=2      -  Cefoxitin: S <=8      -  Ceftriaxone: S <=1      -  Ciprofloxacin: R 1      -  Ertapenem: S <=0.5      -  Gentamicin: R >8      -  Levofloxacin: I 1      -  Meropenem: S <=1      -  Piperacillin/Tazobactam: S <=8      -  Tobramycin: I 8      -  Trimethoprim/Sulfamethoxazole: R >2/38  Organism: Pseudomonas aeruginosa (Carbapenem Resistant)    Sensitivities:      Method Type: CarbaR      -  Resistance Gene to Carbapenem: Nondet  Organism: Pseudomonas aeruginosa (Carbapenem Resistant)    Sensitivities:      Method Type: SIMON      -  Amikacin: S <=16      -  Aztreonam: S 8      -  Cefepime: S 8      -  Ceftazidime: S 4      -  Ceftazidime/Avibactam: S <=4      -  Ceftolozane/tazobactam: S <=2      -  Ciprofloxacin: R >2      -  Gentamicin: R >8      -  Imipenem: R >8      -  Levofloxacin: R >4      -  Meropenem: R 8      -  Piperacillin/Tazobactam: S 16      -  Tobramycin: R >8    Culture - Blood (collected 09 Oct 2023 06:25)  Source: .Blood Blood-Venous  Final Report:    No growth at 5 days    Culture - Sputum (collected 03 Oct 2023 20:25)  Source: Trach Asp Tracheal Aspirate  Final Report:    Numerous Pseudomonas aeruginosa (Carbapenem Resistant) Susceptibility to    follow.    Normal Respiratory Cate absent  Organism: Pseudomonas aeruginosa (Carbapenem Resistant)    Sensitivities:      Method Type: ETEST      -  Colostin: N/A 2      -  Polymyxin B: I 1      -  Imipenem/Relebactam: R 8  Organism: Pseudomonas aeruginosa (Carbapenem Resistant)  Organism: Pseudomonas aeruginosa (Carbapenem Resistant)    Sensitivities:      Method Type: CarbaR      -  Resistance Gene to Carbapenem: Nondet  Organism: Pseudomonas aeruginosa (Carbapenem Resistant)    Sensitivities:      Method Type: SIMON      -  Amikacin: I 32      -  Aztreonam: I 16      -  Cefepime: S 8      -  Ceftazidime: S 4      -  Ceftazidime/Avibactam: S <=4      -  Ceftolozane/tazobactam: S <=2      -  Ciprofloxacin: R >2      -  Gentamicin: R >8      -  Imipenem: R >8      -  Levofloxacin: R >4      -  Meropenem: R >8      -  Piperacillin/Tazobactam: S 16      -  Tobramycin: R >8    HIV-1/2 Combo Result: Nonreact (09-01-23 @ 21:00)    Blood Gas Arterial, Lactate: 1.3 (11-16 @ 10:03)  Lactate, Blood: 0.8 (11-16 @ 02:40)    RADIOLOGY:  imaging below personally reviewed    < from: Xray Chest 1 View- PORTABLE-Urgent (Xray Chest 1 View- PORTABLE-Urgent .) (11.17.23 @ 12:31) >  FINDINGS:  Left IJ hemodialysis catheter has been retracted and its tip is now at   the origin of the SVC. Tracheostomy and enteric tube as well as the loop   recorder and bilateral predominantly airspace opacities is unchanged.    No pneumothorax.    COMPARISON: November 17 at 10:24 AM    < end of copied text >

## 2023-11-18 NOTE — PROVIDER CONTACT NOTE (HYPOGLYCEMIA EVENT) - NS PROVIDER CONTACT BACKGROUND-HYPO
Age: 73y    Gender: Female    POCT Blood Glucose:  116 mg/dL (10-05-23 @ 17:52)  59 mg/dL (10-05-23 @ 17:12)  58 mg/dL (10-05-23 @ 16:58)  110 mg/dL (10-05-23 @ 11:31)  145 mg/dL (10-05-23 @ 05:37)  136 mg/dL (10-04-23 @ 23:38)      eMAR:  atorvastatin   10 milliGRAM(s) Oral (10-04-23 @ 21:59)    insulin NPH human recombinant   11 Unit(s) SubCutaneous (10-05-23 @ 11:58)   11 Unit(s) SubCutaneous (10-05-23 @ 05:38)   11 Unit(s) SubCutaneous (10-04-23 @ 23:39)    
Age: 73y    Gender: Female    POCT Blood Glucose:  133 mg/dL (11-17-23 @ 23:57)  47 mg/dL (11-17-23 @ 23:35)  47 mg/dL (11-17-23 @ 23:32)  116 mg/dL (11-17-23 @ 17:11)  117 mg/dL (11-17-23 @ 11:45)  165 mg/dL (11-17-23 @ 05:33)      eMAR:  atorvastatin   10 milliGRAM(s) Oral (11-17-23 @ 22:25)    dextrose 50% Injectable   25 Gram(s) IV Push (11-17-23 @ 23:42)    insulin lispro (ADMELOG) corrective regimen sliding scale   2 Unit(s) SubCutaneous (11-17-23 @ 05:42)    insulin NPH human recombinant   6 Unit(s) SubCutaneous (11-17-23 @ 17:13)   6 Unit(s) SubCutaneous (11-17-23 @ 11:46)   6 Unit(s) SubCutaneous (11-17-23 @ 05:43)  Patient Obtundent as baseline. Dextrose 50% 25 grams IV administrated as ordered. RN called [facility] [phone] to contact provider, unable to communicate. Tube feeding  re-initiated after placement confirmed.   No insulin given at the moment. f/s to be rechecked after 30 min.

## 2023-11-18 NOTE — PROGRESS NOTE ADULT - SUBJECTIVE AND OBJECTIVE BOX
Interval Events: 600mL removed w/ HD yesterday.       HPI:  74 y/o F DM on insulin, HTN, CKD,breast CA, respiratory arrest and cardiac arrest (2018), CVA with residual weakness, aspiration PNA h/o trache, PEG, both removed presents with respiratory distress requiring intubation. Had recent admission d/c 7/22/23 for cough and respiratory distress (no intubation) thought to be i/s/o aspiration PNA s/p cefepime course; developed rash in response. Infectious w/u was negative at this time. Was discharged home, daughter and  at bedside providing history.     Reports that she was initially improving with O2 sats in the high 90s on room air, however, then became more lethargic and not herself (baseline mental status AOx3). Also had worsening shortness of breath while laying down and leg swelling. Contacted cardiologist who started her on bumex 1mg daily. Developed low sugars overnight before admission and was given juice with some food, daughter reports no coughing during episode. At around 4-5 AM developed vomiting and coughing, O2 sats dropped to 80s and EMS was called. Denies fevers/chills, dysuria or urinary frequency, chest pain, palpitations. Uses wheelchair at home however family moves her around frequently.     In ED, was on BiPAP with increased WOB and was intubated. Found to have elevated pro-BNP and CO2 of 111 on VBG. CXR with small right pleural effusion, started on vanc in the ED.  (11 Aug 2023 09:08)      Subjective: pt unable to provide any history in s/o cognitive impairment       OBJECTIVE:  ICU Vital Signs Last 24 Hrs  T(C): 37.2 (16 Nov 2023 04:00), Max: 37.4 (15 Nov 2023 06:50)  T(F): 98.9 (16 Nov 2023 04:00), Max: 99.3 (15 Nov 2023 06:50)  HR: 107 (16 Nov 2023 03:10) (89 - 128)  BP: --  BP(mean): --  ABP: 145/50 (16 Nov 2023 03:00) (86/34 - 150/56)  ABP(mean): 87 (16 Nov 2023 03:00) (53 - 94)  RR: 24 (16 Nov 2023 03:00) (9 - 24)  SpO2: 100% (16 Nov 2023 03:10) (94% - 100%)    O2 Parameters below as of 16 Nov 2023 04:00  Patient On (Oxygen Delivery Method): ventilator    O2 Concentration (%): 40      Mode: AC/ CMV (Assist Control/ Continuous Mandatory Ventilation), RR (machine): 24, FiO2: 40, PEEP: 8, ITime: 0.8, MAP: 19, PC: 35, PIP: 38    11-14 @ 07:01  -  11-15 @ 07:00  --------------------------------------------------------  IN: 480 mL / OUT: 400 mL / NET: 80 mL    11-15 @ 07:01  -  11-16 @ 06:15  --------------------------------------------------------  IN: 1599.7 mL / OUT: 3100 mL / NET: -1500.3 mL      CAPILLARY BLOOD GLUCOSE      POCT Blood Glucose.: 204 mg/dL (16 Nov 2023 05:11)    Physical Exam:   Constitutional: NAD, well-groomed, well-developed  HEENT: PERRLA, EOMI, no drainage or redness  Neck: supple,  No JVD, Trachea midline  Back: Normal spine flexure, No CVA tenderness, No deformity or limitation of movement  Respiratory: Breath Sounds diminished bilaterally to auscultation, no accessory muscle use noted  Cardiovascular: Regular rate, regular rhythm, normal S1, S2; no murmurs or rub  Gastrointestinal: Soft, non-tender, non distended, no hepatosplenomegaly, normal bowel sounds  Extremities: 3+ peripheral edema, no cyanosis, no clubbing   Vascular: Equal and normal pulses: 2+ peripheral pulses throughout  Neurological: opens eyes spontaneously, sometimes shakes her head, no sensory, motor  deficits, normal reflexes  Psychiatric: calm, normal mood, normal affect  Musculoskeletal: No joint swelling or deformity; no limitation of movement  Skin: warm, dry, well perfused, no rashes          HOSPITAL MEDICATIONS:  Standing Meds:  albuterol/ipratropium for Nebulization 3 milliLiter(s) Nebulizer every 6 hours  artificial tears (preservative free) Ophthalmic Solution 1 Drop(s) Both EYES every 4 hours  aspirin  chewable 81 milliGRAM(s) Enteral Tube daily  atorvastatin 10 milliGRAM(s) Oral at bedtime  calamine/zinc oxide Lotion 1 Application(s) Topical daily  chlorhexidine 0.12% Liquid 15 milliLiter(s) Oral Mucosa every 12 hours  chlorhexidine 2% Cloths 1 Application(s) Topical daily  dextrose 5%. 1000 milliLiter(s) IV Continuous <Continuous>  dextrose 5%. 1000 milliLiter(s) IV Continuous <Continuous>  dextrose 50% Injectable 25 Gram(s) IV Push once  dextrose 50% Injectable 12.5 Gram(s) IV Push once  dextrose 50% Injectable 25 Gram(s) IV Push once  dextrose 50% Injectable 25 Gram(s) IV Push once  droxidopa 600 milliGRAM(s) Oral every 8 hours  epoetin odalis (EPOGEN) Injectable 89653 Unit(s) IV Push <User Schedule>  ferrous    sulfate Liquid 300 milliGRAM(s) Oral daily  glucagon  Injectable 1 milliGRAM(s) IntraMuscular once  heparin   Injectable 5000 Unit(s) SubCutaneous every 8 hours  insulin lispro (ADMELOG) corrective regimen sliding scale   SubCutaneous every 6 hours  insulin NPH human recombinant 6 Unit(s) SubCutaneous every 6 hours  midodrine. 40 milliGRAM(s) Oral <User Schedule>  norepinephrine Infusion 0.05 MICROgram(s)/kG/Min IV Continuous <Continuous>  pantoprazole  Injectable 40 milliGRAM(s) IV Push two times a day  petrolatum Ophthalmic Ointment 1 Application(s) Both EYES four times a day  sodium chloride 1 Gram(s) Oral two times a day  sodium chloride 3%  Inhalation 4 milliLiter(s) Inhalation every 6 hours      PRN Meds:  acetaminophen   Oral Liquid .. 650 milliGRAM(s) Oral every 6 hours PRN  dextrose Oral Gel 15 Gram(s) Oral once PRN  diphenhydrAMINE Elixir 50 milliGRAM(s) Oral once PRN  droxidopa 600 milliGRAM(s) Oral <User Schedule> PRN  midodrine. 40 milliGRAM(s) Oral once PRN  sodium chloride 0.9% Bolus. 250 milliLiter(s) IV Bolus every 5 minutes PRN  sodium chloride 0.9% lock flush 10 milliLiter(s) IV Push every 1 hour PRN      LABS:                        8.8    15.97 )-----------( 463      ( 16 Nov 2023 02:40 )             29.6     Hgb Trend: 8.8<--, 9.0<--, 9.1<--, 9.3<--, 8.9<--  11-16    135  |  100  |  25<H>  ----------------------------<  193<H>  4.1   |  27  |  1.31<H>    Ca    8.7      16 Nov 2023 02:40  Phos  3.6     11-16  Mg     2.30     11-16    TPro  6.2  /  Alb  1.9<L>  /  TBili  <0.2  /  DBili  x   /  AST  17  /  ALT  16  /  AlkPhos  360<H>  11-16    Creatinine Trend: 1.31<--, 1.57<--, 1.33<--, 1.72<--, 1.54<--, 1.18<--    Urinalysis Basic - ( 16 Nov 2023 02:40 )    Color: x / Appearance: x / SG: x / pH: x  Gluc: 193 mg/dL / Ketone: x  / Bili: x / Urobili: x   Blood: x / Protein: x / Nitrite: x   Leuk Esterase: x / RBC: x / WBC x   Sq Epi: x / Non Sq Epi: x / Bacteria: x            MICROBIOLOGY:     RADIOLOGY:  [ ] Reviewed and interpreted by me

## 2023-11-18 NOTE — PROGRESS NOTE ADULT - ASSESSMENT
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       NEUROLOGY  #AMS  # Multiple embolic strokes   # L PCA Infarct   MR head performed w/ new infarct and old infarcts, EEG without seizure events but with high foci potential   - EEG given facial twitching: negative for epileptiform activity (10/31)  - deferring repeat MRI as unlikely to   - baseline  AOX3 at home per daughter, currently spontaneously opening eyes, localized to voice and pain, trached/non-verbal   - Keppra stopped 11/10 (was on as seizure ppx given multiple infarcts, no seizures on multiple EEG, stopped to ensure it is not contributing to sedation), the same mental status, -- opens eyes spontaneously, and shakes her head intermittently, not following commands    - aspirin held 10/11, likely in s/o GIB, Hgb stable not requiring PRBC since 11/4, ASA 81 mg restarted on 11/14  - continue Lipitor    CARDIOVASCULAR  # Septic vs vasoplegic shock   Etiology likely septic iso active infection. Possible component of vasoplegic, however no longer with active infection or on sedation. Off IV vasopressors.   Current regimen:   - droxidopa 600mg q8h with PRN 600mg ordered preHD  - midodrine 40mg q6 h; trial of additional 40mg prior to HD  - required levophed up to 0.14 during HD 11/15  weaned off after dialysis session, will need additional midodrine/droxidopa dose preHD    # HFpEF w/ decompensation   > ECHO w/ EF 59 with severe LVH and diastolic dysfunction   - fluid overloaded, HD to remove fluids     HEENT   # Left ear OE with acute on chronic left-sided otomastoiditis, s/p Abx (see in ID)   - noted to have white discharged/residue, increased from prior per patient's daughter; ENT reconsulted, resumed on cipro drops (10/31 - 11/7),   # 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   - resolved    RESPIRATORY  # AHRF second to volume overload   # Copious inline and oral thick tanned secretions   - CKD vs HFpEF w/ hypercarbia 2/2 volume overload requiring intubation, trach, and HD initiation  - Continue on albuterol and chest PT, cont 3 % INH, added IPV  - Continue volume removal with HD  - hypoxemia w/ turning and repositioning, CXR 11/16 shows moderate right pleural effusion, worsening RUL atelectasis, left has small effusion and/or atelectasis. Plan for flexible bronchoscopy and possible thoracentesis  - CXR 11/16 shows moderate right pleural effusion with suggestion of worsening RUL atelectasis, small left effusion and/or atelectasis.  - Bronchoscopy bedside 11/16 noted with white secretions right > left, BAL sent     #tracheomalacia   - CT CHEST (9/8) with tracheomalacia, BL pleural effusions and persistent consolidations     GI  # GIB: last pRBC transfused 11/4 (10/14 before that)   - Continue on PPI BID  - patient had melena before 11/13 but H/H stable,  Eliquis was held for dark brown/black stool  - as per nursing staff, stool is black 100 in the rectal system in 24 hrs     # Dysphagia   - NGT- tolerating feeds, at goal- changed to nepro was per nutrition    RENAL  # ESRD, L ARTHUR odom   - HD MWF TIW with midodrine and droxidopa   - ICU for vasopressor assisted volume removal, cap to 1 pressor and not offering CRRT due to the comorbidities and prognosis   - f/u w/ nephrology: will continue to offer patient 1-pressor assisted HD as agreed prior, as long as she can tolerate HD maxed on 1 pressor, Levo to 0.3; she will be considered iHD candidate  - IR was planning PermCath 11/14; but procedure was cancelled due to pt in MICU, on vasopressors, and has leucocytosis  - TRISHA odom discontinued 11/16 for line holiday, trialysis catheter placed in [facility] on 11/17   - plan for HD today      INFECTIOUS DISEASE  #Septic shock  , afebrile,  WBC - 15>27, LA wnls  #blood cx 9/1: MSSA with hypotension  repeat negative 9/4, 9/8 s/p 4 weeks of vanco/dapto through 10/2  #Colonized with  pseudomonas   # MSSA/ESBL E Coli PNA  # L otitis Externa s/p ENT debridement c/b Candida, s/p Meropenem and Fluconazole  # Proteus/ Pseudomonas in sputum, s/p Aztreonam   - recent Bcx 11/2 negative, sputum 11/2 with  pseudomonas- colonized   - repeat MRSA PCR neg  - afebrile now after completing course aztreonam 11/6  - monitoring off abx   - f/u BAL (11/16)- only afb was sent which is negative. send tracheal cx today 11/17.    # possible malignant otitis externa   # ESBL ECOLI and MSSA VAP c/b MSSA bacteremia   - hx of MSSA bacteremia, ESBL E. Coli sputum Cx, BAL w/ MSSA  - treated with abx   - eosinophilia workup: JORGE, ANCAs negative    HEME  # Anemia second to GIB vs renal disease   - multiple transfusions, last done 11/4  - Eliquis held 2/2 pt having melena, and now pt with black stools/dark brown  - 11/14  restarted ASA 81 mg   - restarted Heparin SQ prophylaxis 11/14 today, will continue to hold off starting ac given melena.    #Allergic transfusion reaction: per RCU documentation, developed erythematous rash across chest shortly after starting transfusion, blood bank consulted and diagnosed mild allergic rxn  - premedicate w/ benadryl and famotidine prior to future transfusions  - no issue w/ pRBC transfusion 11/4    VASCULAR   # LLE POP DVT   - on Eliquis (held as of 11/11 PM for procedure and also patient having melena), stools are black/dark brown  - restarted Heparin SQ prophylaxis 11/14 and will continue to hold off starting Eliquis given melena.  - SCDs    # HD access  - LIZJ CLAUDIA (9/27 - 10/21)  - LIJ shiley replaced for HD (10/24 -11/15 ) L IJ shiley was removed today after HD and new line will be placed when pt need next HD session, will need Trialysis Jagrutiley line, which is at the bedside and consent obtained from the family   - Permacath was planned on 11/14, but cancelled 2/2 pt in MICU, requiring pressors, and + leucocytosis   - L LIJ Trialysis catheter placed 11/17    ONC   # Hx of Breast CA   - Patient dx in 2018 and s/p B/L mastectomy (radical on right) and chemo and RT.     ENDOCRINE  # IDDM2   - Continue on NPH 6U with moderate ISS   - Adjust as needed      ETHICS/ GOC    - Attempted palliative discussion however family not interested and wishes for FULL CODE  - Family continues to want everything done despite inability to tolerate HD on multiple oral pressor  - Levo capped at 0.3 during HD     11/17- updated pts spouse over the phone and daughter at bedside.  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       NEUROLOGY  #AMS  # Multiple embolic strokes   # L PCA Infarct   MR head performed w/ new infarct and old infarcts, EEG without seizure events but with high foci potential   - EEG given facial twitching: negative for epileptiform activity (10/31)  - deferring repeat MRI as unlikely to   - baseline  AOX3 at home per daughter, currently spontaneously opening eyes, localized to voice and pain, trached/non-verbal   - Keppra stopped 11/10 (was on as seizure ppx given multiple infarcts, no seizures on multiple EEG, stopped to ensure it is not contributing to sedation), the same mental status, -- opens eyes spontaneously, and shakes her head intermittently, not following commands    - aspirin held 10/11, likely in s/o GIB, Hgb stable not requiring PRBC since 11/4, ASA 81 mg restarted on 11/14  - continue Lipitor    CARDIOVASCULAR  # Septic vs vasoplegic shock   Etiology likely septic iso active infection. Possible component of vasoplegic, however no longer with active infection or on sedation. Off IV vasopressors.   Current regimen:   - droxidopa 600mg q8h with PRN 600mg ordered preHD  - midodrine 40mg q6 h; trial of additional 40mg prior to HD  - required levophed up to 0.14 during HD 11/15  weaned off after dialysis session, will need additional midodrine/droxidopa dose preHD    # HFpEF w/ decompensation   > ECHO w/ EF 59 with severe LVH and diastolic dysfunction   - fluid overloaded, HD to remove fluids     HEENT   # Left ear OE with acute on chronic left-sided otomastoiditis, s/p Abx (see in ID)   - noted to have white discharged/residue, increased from prior per patient's daughter; ENT reconsulted, resumed on cipro drops (10/31 - 11/7),   # 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   - resolved    RESPIRATORY  # AHRF second to volume overload   # Copious inline and oral thick tanned secretions   - CKD vs HFpEF w/ hypercarbia 2/2 volume overload requiring intubation, trach, and HD initiation  - Continue on albuterol and chest PT, cont 3 % INH, added IPV  - Continue volume removal with HD  - hypoxemia w/ turning and repositioning, CXR 11/16 shows moderate right pleural effusion, worsening RUL atelectasis, left has small effusion and/or atelectasis. Plan for flexible bronchoscopy and possible thoracentesis  - CXR 11/16 shows moderate right pleural effusion with suggestion of worsening RUL atelectasis, small left effusion and/or atelectasis.  - Bronchoscopy bedside 11/16 noted with white secretions right > left, BAL sent     #tracheomalacia   - CT CHEST (9/8) with tracheomalacia, BL pleural effusions and persistent consolidations     GI  # GIB: last pRBC transfused 11/4 (10/14 before that)   - Continue on PPI BID  - patient had melena before 11/13 but H/H stable,  Eliquis was held for dark brown/black stool  - as per nursing staff, stool is black 100 in the rectal system in 24 hrs     # Dysphagia   - NGT- tolerating feeds, at goal- changed to nepro was per nutrition    RENAL  # ESRD, L ARTHUR taylor   - HD MWF TIW with midodrine and droxidopa   - ICU for vasopressor assisted volume removal, cap to 1 pressor and not offering CRRT due to the comorbidities and prognosis   - f/u w/ nephrology: will continue to offer patient 1-pressor assisted HD as agreed prior, as long as she can tolerate HD maxed on 1 pressor, Levo to 0.3; she will be considered iHD candidate  - IR was planning PermCath 11/14; but procedure was cancelled due to pt in MICU, on vasopressors, and has leucocytosis  - TRISHA taylor discontinued 11/16 for line holiday, trialysis catheter placed in [facility] on 11/17 malfunctioning, removed 11/18       INFECTIOUS DISEASE  #Septic shock  , afebrile,  WBC - 15>27, LA wnls  #blood cx 9/1: MSSA with hypotension  repeat negative 9/4, 9/8 s/p 4 weeks of vanco/dapto through 10/2  #Colonized with  pseudomonas   # MSSA/ESBL E Coli PNA  # L otitis Externa s/p ENT debridement c/b Candida, s/p Meropenem and Fluconazole  # Proteus/ Pseudomonas in sputum, s/p Aztreonam   - recent Bcx 11/2 negative, sputum 11/2 with  pseudomonas- colonized   - repeat MRSA PCR neg  - afebrile now after completing course aztreonam 11/6  - monitoring off abx   - f/u BAL (11/16)- only afb was sent which is negative.     # possible malignant otitis externa   # ESBL ECOLI and MSSA VAP c/b MSSA bacteremia   - hx of MSSA bacteremia, ESBL E. Coli sputum Cx, BAL w/ MSSA  - treated with abx   - eosinophilia workup: JORGE, ANCAs negative    HEME  # Anemia second to GIB vs renal disease   - multiple transfusions, last done 11/4  - Eliquis held 2/2 pt having melena, and now pt with black stools/dark brown  - 11/14  restarted ASA 81 mg   - restarted Heparin SQ prophylaxis 11/14 today, will continue to hold off starting ac given melena.    #Allergic transfusion reaction: per RCU documentation, developed erythematous rash across chest shortly after starting transfusion, blood bank consulted and diagnosed mild allergic rxn  - premedicate w/ benadryl and famotidine prior to future transfusions  - no issue w/ pRBC transfusion 11/4    VASCULAR   # LLE POP DVT   - on Eliquis (held as of 11/11 PM for procedure and also patient having melena), stools are black/dark brown  - restarted Heparin SQ prophylaxis 11/14 and will continue to hold off starting Eliquis given melena.  - SCDs    # HD access  - TRISHA TAYLOR (9/27 - 10/21)  - LIZJ shiley replaced for HD (10/24 -11/15 ) L IJ shiley was removed today after HD and new line will be placed when pt need next HD session, will need Trialysis Ashlee line, which is at the bedside and consent obtained from the family   - Permacath was planned on 11/14, but cancelled 2/2 pt in MICU, requiring pressors, and + leucocytosis   - L LIJ Trialysis catheter placed 11/17    ONC   # Hx of Breast CA   - Patient dx in 2018 and s/p B/L mastectomy (radical on right) and chemo and RT.     ENDOCRINE  # IDDM2   - Continue on NPH 6U with moderate ISS   - Adjust as needed      ETHICS/ GOC    - Attempted palliative discussion however family not interested and wishes for FULL CODE  - Family continues to want everything done despite inability to tolerate HD on multiple oral pressor  - Levo capped at 0.3 during HD     11/17- updated pts spouse over the phone and daughter at bedside.  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       NEUROLOGY  #AMS  # Multiple embolic strokes   # L PCA Infarct   MR head performed w/ new infarct and old infarcts, EEG without seizure events but with high foci potential   - EEG given facial twitching: negative for epileptiform activity (10/31)  - deferring repeat MRI as unlikely to   - baseline  AOX3 at home per daughter, currently spontaneously opening eyes, localized to voice and pain, trached/non-verbal   - Keppra stopped 11/10 (was on as seizure ppx given multiple infarcts, no seizures on multiple EEG, stopped to ensure it is not contributing to sedation), the same mental status, -- opens eyes spontaneously, and shakes her head intermittently, not following commands    - aspirin held 10/11, likely in s/o GIB, Hgb stable not requiring PRBC since 11/4, ASA 81 mg restarted on 11/14  - continue Lipitor    CARDIOVASCULAR  # Septic vs vasoplegic shock   Etiology likely septic iso active infection. Possible component of vasoplegic, however no longer with active infection or on sedation. Off IV vasopressors.   Current regimen:   - droxidopa 600mg q8h with PRN 600mg ordered preHD  - midodrine 40mg q6 h; trial of additional 40mg prior to HD  - required levophed up to 0.14 during HD 11/15  weaned off after dialysis session, will need additional midodrine/droxidopa dose preHD    # HFpEF w/ decompensation   > ECHO w/ EF 59 with severe LVH and diastolic dysfunction   - fluid overloaded, HD to remove fluids     HEENT   # Left ear OE with acute on chronic left-sided otomastoiditis, s/p Abx (see in ID)   - noted to have white discharged/residue, increased from prior per patient's daughter; ENT reconsulted, resumed on cipro drops (10/31 - 11/7),   # 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   - resolved    RESPIRATORY  # AHRF second to volume overload   # Copious inline and oral thick tanned secretions   - CKD vs HFpEF w/ hypercarbia 2/2 volume overload requiring intubation, trach, and HD initiation  - Continue on albuterol and chest PT, cont 3 % INH, added IPV  - Continue volume removal with HD  - hypoxemia w/ turning and repositioning, CXR 11/16 shows moderate right pleural effusion, worsening RUL atelectasis, left has small effusion and/or atelectasis. Plan for flexible bronchoscopy and possible thoracentesis  - CXR 11/16 shows moderate right pleural effusion with suggestion of worsening RUL atelectasis, small left effusion and/or atelectasis.  - Bronchoscopy bedside 11/16 noted with white secretions right > left, BAL sent     #tracheomalacia   - CT CHEST (9/8) with tracheomalacia, BL pleural effusions and persistent consolidations     GI  # GIB: last pRBC transfused 11/4 (10/14 before that)   - Continue on PPI BID  - patient had melena before 11/13 but H/H stable,  Eliquis was held for dark brown/black stool  - as per nursing staff, stool is black 100 in the rectal system in 24 hrs     # Dysphagia   - NGT- tolerating feeds, at goal- changed to nepro was per nutrition    RENAL  # ESRD, L ARTHUR taylor   - HD MWF TIW with midodrine and droxidopa   - ICU for vasopressor assisted volume removal, cap to 1 pressor and not offering CRRT due to the comorbidities and prognosis   - f/u w/ nephrology: will continue to offer patient 1-pressor assisted HD as agreed prior, as long as she can tolerate HD maxed on 1 pressor, Levo to 0.3; she will be considered iHD candidate  - IR was planning PermCath 11/14; but procedure was cancelled due to pt in MICU, on vasopressors, and has leucocytosis  - [facility] ilene discontinued 11/16 for line holiday, trialysis catheter placed in [facility] on 11/17 however was removed 11/18 iso poor flow rates during HD.      INFECTIOUS DISEASE  #Septic shock  , afebrile,  WBC - 15>27, LA wnls  #blood cx 9/1: MSSA with hypotension  repeat negative 9/4, 9/8 s/p 4 weeks of vanco/dapto through 10/2  #Colonized with  pseudomonas   # MSSA/ESBL E Coli PNA  # L otitis Externa s/p ENT debridement c/b Candida, s/p Meropenem and Fluconazole  # Proteus/ Pseudomonas in sputum, s/p Aztreonam   - recent Bcx 11/2 negative, sputum 11/2 with  pseudomonas- colonized   - repeat MRSA PCR neg  - afebrile now after completing course aztreonam 11/6  - monitoring off abx   - f/u BAL (11/16)- only afb was sent which is negative.     # possible malignant otitis externa   # ESBL ECOLI and MSSA VAP c/b MSSA bacteremia   - hx of MSSA bacteremia, ESBL E. Coli sputum Cx, BAL w/ MSSA  - treated with abx   - eosinophilia workup: JORGE, ANCAs negative    HEME  # Anemia second to GIB vs renal disease   - multiple transfusions, last done 11/4  - Eliquis held 2/2 pt having melena, and now pt with black stools/dark brown  - 11/14  restarted ASA 81 mg   - restarted Heparin SQ prophylaxis 11/14 today, will continue to hold off starting ac given melena.    #Allergic transfusion reaction: per RCU documentation, developed erythematous rash across chest shortly after starting transfusion, blood bank consulted and diagnosed mild allergic rxn  - premedicate w/ benadryl and famotidine prior to future transfusions  - no issue w/ pRBC transfusion 11/4    VASCULAR   # LLE POP DVT   - on Eliquis (held as of 11/11 PM for procedure and also patient having melena), stools are black/dark brown  - restarted Heparin SQ prophylaxis 11/14 and will continue to hold off starting Eliquis given melena.  - SCDs    # HD access  - TRISHA TAYLOR (9/27 - 10/21)  - LIJ shiley replaced for HD (10/24 -11/15 ) L IJ shiley was removed today after HD and new line will be placed when pt need next HD session, will need Trialysis Jagrutiley line, which is at the bedside and consent obtained from the family   - Permacath was planned on 11/14, but cancelled 2/2 pt in MICU, requiring pressors, and + leucocytosis   - L LIJ Trialysis catheter placed 11/17    ONC   # Hx of Breast CA   - Patient dx in 2018 and s/p B/L mastectomy (radical on right) and chemo and RT.     ENDOCRINE  # IDDM2   - Continue on NPH 6U with moderate ISS   - Adjust as needed      ETHICS/ GOC    - Attempted palliative discussion however family not interested and wishes for FULL CODE  - Family continues to want everything done despite inability to tolerate HD on multiple oral pressor  - Levo capped at 0.3 during HD     11/17- updated pts spouse over the phone and daughter at bedside.

## 2023-11-18 NOTE — PROGRESS NOTE ADULT - SUBJECTIVE AND OBJECTIVE BOX
Patient is a 73y old  Female who presents with a chief complaint of Respiratory distress (18 Nov 2023 09:11)      SUBJECTIVE / OVERNIGHT EVENTS: leukocytosis is down today to 17K from 27K on 11/17. presumed this is 2/2 BAL via bronch on 11/16. afebrile    MEDICATIONS  (STANDING):  albuterol/ipratropium for Nebulization 3 milliLiter(s) Nebulizer every 6 hours  artificial tears (preservative free) Ophthalmic Solution 1 Drop(s) Both EYES every 4 hours  aspirin  chewable 81 milliGRAM(s) Enteral Tube daily  atorvastatin 10 milliGRAM(s) Oral at bedtime  calamine/zinc oxide Lotion 1 Application(s) Topical daily  chlorhexidine 0.12% Liquid 15 milliLiter(s) Oral Mucosa every 12 hours  chlorhexidine 2% Cloths 1 Application(s) Topical daily  chlorhexidine 4% Liquid 1 Application(s) Topical <User Schedule>  dextrose 5%. 1000 milliLiter(s) (50 mL/Hr) IV Continuous <Continuous>  dextrose 5%. 1000 milliLiter(s) (100 mL/Hr) IV Continuous <Continuous>  dextrose 50% Injectable 25 Gram(s) IV Push once  dextrose 50% Injectable 12.5 Gram(s) IV Push once  dextrose 50% Injectable 25 Gram(s) IV Push once  dextrose 50% Injectable 25 Gram(s) IV Push once  droxidopa 600 milliGRAM(s) Oral every 8 hours  epoetin odalis (EPOGEN) Injectable 62156 Unit(s) IV Push <User Schedule>  ferrous    sulfate Liquid 300 milliGRAM(s) Oral daily  glucagon  Injectable 1 milliGRAM(s) IntraMuscular once  heparin   Injectable 5000 Unit(s) SubCutaneous every 8 hours  insulin lispro (ADMELOG) corrective regimen sliding scale   SubCutaneous every 6 hours  insulin NPH human recombinant 6 Unit(s) SubCutaneous every 6 hours  midodrine. 40 milliGRAM(s) Oral <User Schedule>  norepinephrine Infusion 0.05 MICROgram(s)/kG/Min (6.23 mL/Hr) IV Continuous <Continuous>  pantoprazole  Injectable 40 milliGRAM(s) IV Push two times a day  petrolatum Ophthalmic Ointment 1 Application(s) Both EYES four times a day  sodium chloride 1 Gram(s) Oral two times a day  sodium chloride 3%  Inhalation 4 milliLiter(s) Inhalation every 6 hours    MEDICATIONS  (PRN):  acetaminophen   Oral Liquid .. 650 milliGRAM(s) Oral every 6 hours PRN Temp greater or equal to 38C (100.4F), Mild Pain (1 - 3)  dextrose Oral Gel 15 Gram(s) Oral once PRN Blood Glucose LESS THAN 70 milliGRAM(s)/deciliter  diphenhydrAMINE Elixir 50 milliGRAM(s) Oral once PRN Rash and/or Itching  droxidopa 600 milliGRAM(s) Oral <User Schedule> PRN prior to hemodialysis  midodrine. 40 milliGRAM(s) Oral once PRN 1 Hour Pre HD  sodium chloride 0.9% Bolus. 250 milliLiter(s) IV Bolus every 5 minutes PRN SBP LESS THAN or EQUAL to 90 mmHg  sodium chloride 0.9% lock flush 10 milliLiter(s) IV Push every 1 hour PRN Pre/post blood products, medications, blood draw, and to maintain line patency      Vital Signs Last 24 Hrs  T(F): 99.3 (11-18-23 @ 12:00), Max: 99.4 (11-18-23 @ 04:00)  HR: 93 (11-18-23 @ 17:00) (93 - 120)  BP: --  RR: 24 (11-18-23 @ 17:00) (18 - 25)  SpO2: 100% (11-18-23 @ 17:00) (94% - 100%)  Telemetry:   CAPILLARY BLOOD GLUCOSE      POCT Blood Glucose.: 164 mg/dL (18 Nov 2023 17:07)  POCT Blood Glucose.: 135 mg/dL (18 Nov 2023 11:11)  POCT Blood Glucose.: 91 mg/dL (18 Nov 2023 06:49)  POCT Blood Glucose.: 106 mg/dL (18 Nov 2023 00:49)  POCT Blood Glucose.: 133 mg/dL (17 Nov 2023 23:57)  POCT Blood Glucose.: 47 mg/dL (17 Nov 2023 23:35)  POCT Blood Glucose.: 47 mg/dL (17 Nov 2023 23:32)    I&O's Summary    17 Nov 2023 07:01  -  18 Nov 2023 07:00  --------------------------------------------------------  IN: 1100 mL / OUT: 1150 mL / NET: -50 mL    18 Nov 2023 07:01  -  18 Nov 2023 17:20  --------------------------------------------------------  IN: 330 mL / OUT: 100 mL / NET: 230 mL        PHYSICAL EXAM:  GENERAL: NAD, well-developed  HEAD:  Atraumatic, Normocephalic  EYES: EOMI, PERRLA, conjunctiva and sclera clear  NECK: Supple, No JVD  CHEST/LUNG: Clear to auscultation bilaterally; No wheeze  HEART: Regular rate and rhythm; No murmurs, rubs, or gallops  ABDOMEN: Soft, Nontender, Nondistended; Bowel sounds present  EXTREMITIES:  2+ Peripheral Pulses, No clubbing, cyanosis, or edema  PSYCH: AAOx3  NEUROLOGY: non-focal  SKIN: No rashes or lesions    LABS:                        8.3    17.62 )-----------( 352      ( 18 Nov 2023 03:50 )             27.7     11-18    137  |  100  |  29<H>  ----------------------------<  99  4.1   |  26  |  1.53<H>    Ca    8.7      18 Nov 2023 03:50  Phos  3.7     11-18  Mg     2.20     11-18    TPro  6.0  /  Alb  1.8<L>  /  TBili  <0.2  /  DBili  x   /  AST  20  /  ALT  12  /  AlkPhos  304<H>  11-18          Urinalysis Basic - ( 18 Nov 2023 03:50 )    Color: x / Appearance: x / SG: x / pH: x  Gluc: 99 mg/dL / Ketone: x  / Bili: x / Urobili: x   Blood: x / Protein: x / Nitrite: x   Leuk Esterase: x / RBC: x / WBC x   Sq Epi: x / Non Sq Epi: x / Bacteria: x        RADIOLOGY & ADDITIONAL TESTS:    Imaging Personally Reviewed:    Consultant(s) Notes Reviewed:      Care Discussed with Consultants/Other Providers:

## 2023-11-18 NOTE — PROGRESS NOTE ADULT - ATTENDING COMMENTS
73 [place] old with DM, prior CVA, kidney disease  Now with respiratory failure  Requiring HD- but difficulty tolerating    S/p bronch with removal of secretions  WBC increased post procedure but now downtrending  Vent setting stable  CXR improved off antibiotics    Known colonization with MDRO pseudomonas    Her overall prognosis is grave.  Her airways are chronically colonized.  She is at risk for recurrent pna with MDRO organisms and limited treatment options.  At this time, her respiratory status is stable and her CXR and WBC are improving off antibiotics.    I would observe off antibiotics  Reassess if change in status  Prognosis grave.  Continue GOC discussions      Call ID service with questions

## 2023-11-18 NOTE — PROGRESS NOTE ADULT - SUBJECTIVE AND OBJECTIVE BOX
Subjective: Patient seen and examined. No new events except as noted.   remains in ICU     REVIEW OF SYSTEMS:  Unable to obtain      MEDICATIONS:  MEDICATIONS  (STANDING):  albuterol/ipratropium for Nebulization 3 milliLiter(s) Nebulizer every 6 hours  artificial tears (preservative free) Ophthalmic Solution 1 Drop(s) Both EYES every 4 hours  aspirin  chewable 81 milliGRAM(s) Enteral Tube daily  atorvastatin 10 milliGRAM(s) Oral at bedtime  calamine/zinc oxide Lotion 1 Application(s) Topical daily  chlorhexidine 0.12% Liquid 15 milliLiter(s) Oral Mucosa every 12 hours  chlorhexidine 2% Cloths 1 Application(s) Topical daily  chlorhexidine 4% Liquid 1 Application(s) Topical <User Schedule>  dextrose 5%. 1000 milliLiter(s) (50 mL/Hr) IV Continuous <Continuous>  dextrose 5%. 1000 milliLiter(s) (100 mL/Hr) IV Continuous <Continuous>  dextrose 50% Injectable 25 Gram(s) IV Push once  dextrose 50% Injectable 12.5 Gram(s) IV Push once  dextrose 50% Injectable 25 Gram(s) IV Push once  dextrose 50% Injectable 25 Gram(s) IV Push once  droxidopa 600 milliGRAM(s) Oral every 8 hours  epoetin odalis (EPOGEN) Injectable 17977 Unit(s) IV Push <User Schedule>  ferrous    sulfate Liquid 300 milliGRAM(s) Oral daily  glucagon  Injectable 1 milliGRAM(s) IntraMuscular once  heparin   Injectable 5000 Unit(s) SubCutaneous every 8 hours  insulin lispro (ADMELOG) corrective regimen sliding scale   SubCutaneous every 6 hours  insulin NPH human recombinant 6 Unit(s) SubCutaneous every 6 hours  midodrine. 40 milliGRAM(s) Oral <User Schedule>  norepinephrine Infusion 0.05 MICROgram(s)/kG/Min (6.23 mL/Hr) IV Continuous <Continuous>  pantoprazole  Injectable 40 milliGRAM(s) IV Push two times a day  petrolatum Ophthalmic Ointment 1 Application(s) Both EYES four times a day  sodium chloride 1 Gram(s) Oral two times a day  sodium chloride 3%  Inhalation 4 milliLiter(s) Inhalation every 6 hours      PHYSICAL EXAM:  T(C): 37.2 (11-18-23 @ 16:00), Max: 37.4 (11-18-23 @ 04:00)  HR: 107 (11-18-23 @ 18:00) (93 - 120)  BP: --  RR: 22 (11-18-23 @ 18:00) (18 - 25)  SpO2: 100% (11-18-23 @ 17:00) (95% - 100%)  Wt(kg): --  I&O's Summary    17 Nov 2023 07:01  -  18 Nov 2023 07:00  --------------------------------------------------------  IN: 1100 mL / OUT: 1150 mL / NET: -50 mL    18 Nov 2023 07:01  -  18 Nov 2023 19:02  --------------------------------------------------------  IN: 360 mL / OUT: 150 mL / NET: 210 mL        Appearance: NAD, +trach  +NGT  HEENT: dry oral mucosa  Lymphatic: No lymphadenopathy  Cardiovascular: Normal S1 S2, No JVD, No murmurs, No edema  Respiratory: Decreased BS, + trach to vent	  Neuro: opens eyes  Gastrointestinal: Soft, Non-tender, + BS, + NGT  Skin: No rashes, No ecchymoses, No cyanosis	  Extremities: No strength/ROM 2/2 sedation, + BL LE edema  Vascular: Peripheral pulses palpable 2+ bilaterally  Anasarca     Mode: AC/ CMV (Assist Control/ Continuous Mandatory Ventilation), RR (machine): 24, FiO2: 40, PEEP: 8, ITime: 0.8, MAP: 19, PC: 35, PIP: 38      LABS:    CARDIAC MARKERS:                                8.3    17.62 )-----------( 352      ( 18 Nov 2023 03:50 )             27.7     11-18    137  |  100  |  29<H>  ----------------------------<  99  4.1   |  26  |  1.53<H>    Ca    8.7      18 Nov 2023 03:50  Phos  3.7     11-18  Mg     2.20     11-18    TPro  6.0  /  Alb  1.8<L>  /  TBili  <0.2  /  DBili  x   /  AST  20  /  ALT  12  /  AlkPhos  304<H>  11-18          TELEMETRY: 	  SR  ECG:  	  RADIOLOGY:   DIAGNOSTIC TESTING:  [ ] Echocardiogram:  [ ]  Catheterization:  [ ] Stress Test:    OTHER:

## 2023-11-18 NOTE — PROGRESS NOTE ADULT - ASSESSMENT
73 F with DM, HTN, CKD, breast CA, latent TB (treated first with INH then had rash and switched to rifampin), h/o MSSA bacteremia, C Diff, respiratory arrest and cardiac arrest (2018), s/p trach/PEG then removed, CVA with residual weakness, aspiration PNA, 1/4 sputums MAC 7/2023, admitted 8/11 with respiratory failure    Sputum cx with MSSA  Developed rash and renal failure after Cefepime, was on HD then discontinued and now again on HD  Head MRI 8/17 with acute cerebellar infarct  Had renal failure, AIN? Family declined renal biopsy started on prednisone  S/p trach 9/1 and BAL with MSSA  ET cx with ESBL and MSSA  Blood cx 9/1: MSSA with hypotension, repeat negative 9/4, 9/8, TTE no veg s/p 4 weeks of Vancomycin/Daptomycin through 10/2  L ear edema and otitis externa, s/p a long course of Meropenem, the last cx showed candida and ENT stated it could be fungal (saw black spores?) so was also given 7 days of fluconazole  Still with intermittent fevers and then sputum cx with Carbapenem Resistant Pseudomonas, s/p a course of Polymixin  No improvement in mental status and ?seizure as well  Had drop in Hgb s/p transfusion and then again febrile 10/15-10/17 with leukocytosis, rash and eosinophilia, sputum growing pseudomonas and proteus (both sensitive to Aztreonam) ?VAP vs a transfusion reaction  Hypotension during HD, transferred to MICU, now off pressors, Shiley was removed in view of intermittent fever, now has new Shiley   Pt again febrile to 104.1 on 11/2, WBC 13 and the L ear again with otitis externa-  Afebrile now after completing course aztreonam 11/6  Blood cultures 11/2 no growth   CXR effusion   ENT evaluated left ear- improved     Monitoring off antibiotics for now   Colonized with  Pseudo in sputum   Contact precautions     11/18:  ID reconsulted as pt with leukocytosis up to 27, now down to 17  Had a bronch on 11/16 - leukocytosis reactive?  Last fever T max 100.5 on 11/7  Last cx 11/2 blood cx negative and ETT cx with CR Pseudomonas   Last CXR bilateral predominantly airspace opacities unchanged 11/17    Suggest;  Continue to monitor off of antibiotics   Trend leukocytosis to resolution     All recommendations are tentative pending Attending Attestation.    Josemanuel Sainz MD, PGY-5   ID Fellow  [employer] Teams Preferred  After 5pm/weekends call [phone]

## 2023-11-18 NOTE — PROVIDER CONTACT NOTE (HYPOGLYCEMIA EVENT) - NS PROVIDER CONTACT CONTRIBUTING FACTORS OF EPISODE
Tube feeding/Chronic kidney disease
Tube feeding held. KO placement confirmation needed./Poor oral intake within the last 24 hours/Tube feeding

## 2023-11-19 NOTE — PROGRESS NOTE ADULT - ASSESSMENT
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 Last dialyzed Wednesday, due today     (2)CV- tenuous hemodynamics - intermittently requiring pressor gtt/on q6h Midodrine     (3)Pulm- trach/vent-dependent    (4)Anemia- on Epogen with HD    (5)GOC - s/p repeated discussions with family regarding GOC - family persisting with interest in aggressive measures.     RECOMMEND:  (1)HD today - 2.4kg UF as able; Epogen with HD  (2)           Jessica Cool NP  [facility]  [phone]      IMPRESSION: 73F w/ HTN, DM2, CVA, breast CA-bilateral mastectomy, recurrent aspiration pneumonia/respiratory failure, and CKD, 8/11/23 p/w acute hypercapnic respiratory failure; c/b RUSS    (1)Renal - RUSS on CKD4 ==>newly ESRD-HD as of this admission. Last dialyzed Wednesday, due loss of access.    (2)CV- tenuous hemodynamics - intermittently requiring pressor gtt/on q6h Midodrine     (3)Pulm- trach/vent-dependent    (4)Anemia- on Epogen with HD    (5)GOC - s/p repeated discussions with family regarding GOC - family persisting with interest in aggressive measures.     RECOMMEND:  (1)HD - 2.4kg UF as able; Epogen with HD  (2) Awaiting permacath access.          Jessica Cool NP  [facility]  [phone]

## 2023-11-19 NOTE — PROGRESS NOTE ADULT - SUBJECTIVE AND OBJECTIVE BOX
Nephrology Progress Note    Patient is a 73y female with    Allergies:  isoniazid (Rash)  nafcillin (Unknown)  hydrALAZINE (Rash)  vitamin E (Short breath; Urticaria; Hives)  doxycycline (Rash)  cefepime (Rash)  NIFEdipine (Urticaria; Hives)  Zosyn (Rash)    Hospital Medications:   MEDICATIONS  (STANDING):  albuterol/ipratropium for Nebulization 3 milliLiter(s) Nebulizer every 6 hours  artificial tears (preservative free) Ophthalmic Solution 1 Drop(s) Both EYES every 4 hours  aspirin  chewable 81 milliGRAM(s) Enteral Tube daily  atorvastatin 10 milliGRAM(s) Oral at bedtime  calamine/zinc oxide Lotion 1 Application(s) Topical daily  chlorhexidine 0.12% Liquid 15 milliLiter(s) Oral Mucosa every 12 hours  chlorhexidine 2% Cloths 1 Application(s) Topical daily  chlorhexidine 4% Liquid 1 Application(s) Topical <User Schedule>  dextrose 5%. 1000 milliLiter(s) (50 mL/Hr) IV Continuous <Continuous>  dextrose 5%. 1000 milliLiter(s) (100 mL/Hr) IV Continuous <Continuous>  dextrose 50% Injectable 25 Gram(s) IV Push once  dextrose 50% Injectable 12.5 Gram(s) IV Push once  dextrose 50% Injectable 25 Gram(s) IV Push once  dextrose 50% Injectable 25 Gram(s) IV Push once  droxidopa 600 milliGRAM(s) Oral every 8 hours  epoetin odalis (EPOGEN) Injectable 75397 Unit(s) IV Push <User Schedule>  ferrous    sulfate Liquid 300 milliGRAM(s) Oral daily  glucagon  Injectable 1 milliGRAM(s) IntraMuscular once  heparin   Injectable 5000 Unit(s) SubCutaneous every 8 hours  insulin lispro (ADMELOG) corrective regimen sliding scale   SubCutaneous every 6 hours  insulin NPH human recombinant 6 Unit(s) SubCutaneous every 6 hours  midodrine. 40 milliGRAM(s) Oral <User Schedule>  norepinephrine Infusion 0.05 MICROgram(s)/kG/Min (6.23 mL/Hr) IV Continuous <Continuous>  pantoprazole  Injectable 40 milliGRAM(s) IV Push two times a day  petrolatum Ophthalmic Ointment 1 Application(s) Both EYES four times a day  sodium chloride 1 Gram(s) Oral two times a day  sodium chloride 3%  Inhalation 4 milliLiter(s) Inhalation every 6 hours      VITALS:  T(F): 98.8 (11-19-23 @ 12:00), Max: 99 (11-18-23 @ 16:00)  HR: 100 (11-19-23 @ 13:00)  BP: --  RR: 24 (11-19-23 @ 13:00)  SpO2: 100% (11-19-23 @ 13:00)        11-17 @ 07:01  -  11-18 @ 07:00  --------------------------------------------------------  IN: 1100 mL / OUT: 1150 mL / NET: -50 mL    11-18 @ 07:01  -  11-19 @ 07:00  --------------------------------------------------------  IN: 810 mL / OUT: 150 mL / NET: 660 mL    11-19 @ 07:01  -  11-19 @ 13:40  --------------------------------------------------------  IN: 270 mL / OUT: 0 mL / NET: 270 mL        PHYSICAL EXAM:  Constitutional: NAD  HEENT: anicteric sclera, oropharynx clear, MMM  Neck: No JVD  Respiratory: CTAB, no wheezes, rales or rhonchi  Cardiovascular: S1, S2, RRR  Gastrointestinal: BS+, soft, NT/ND  Extremities: No cyanosis or clubbing. No peripheral edema  Neurological: A/O x 3, no focal deficits  Psychiatric: Normal mood, normal affect  : No CVA tenderness. No willard.   Skin: No rashes  Vascular Access:    LABS:  11-19    135  |  100  |  33<H>  ----------------------------<  124<H>  4.1   |  25  |  1.73<H>    Ca    8.8      19 Nov 2023 02:49  Phos  4.3     11-19  Mg     2.30     11-19    TPro  6.1  /  Alb  1.9<L>  /  TBili  <0.2  /  DBili      /  AST  18  /  ALT  12  /  AlkPhos  338<H>  11-19                          9.0    15.46 )-----------( 436      ( 19 Nov 2023 02:49 )             30.9                Nephrology Progress Note    Patient is a 73y female seen in ICU, breathing stable.   at bedside.  No HD today    Allergies:  isoniazid (Rash)  nafcillin (Unknown)  hydrALAZINE (Rash)  vitamin E (Short breath; Urticaria; Hives)  doxycycline (Rash)  cefepime (Rash)  NIFEdipine (Urticaria; Hives)  Zosyn (Rash)    Hospital Medications:   MEDICATIONS  (STANDING):  albuterol/ipratropium for Nebulization 3 milliLiter(s) Nebulizer every 6 hours  artificial tears (preservative free) Ophthalmic Solution 1 Drop(s) Both EYES every 4 hours  aspirin  chewable 81 milliGRAM(s) Enteral Tube daily  atorvastatin 10 milliGRAM(s) Oral at bedtime  calamine/zinc oxide Lotion 1 Application(s) Topical daily  chlorhexidine 0.12% Liquid 15 milliLiter(s) Oral Mucosa every 12 hours  chlorhexidine 2% Cloths 1 Application(s) Topical daily  chlorhexidine 4% Liquid 1 Application(s) Topical <User Schedule>  dextrose 5%. 1000 milliLiter(s) (50 mL/Hr) IV Continuous <Continuous>  dextrose 5%. 1000 milliLiter(s) (100 mL/Hr) IV Continuous <Continuous>  dextrose 50% Injectable 25 Gram(s) IV Push once  dextrose 50% Injectable 12.5 Gram(s) IV Push once  dextrose 50% Injectable 25 Gram(s) IV Push once  dextrose 50% Injectable 25 Gram(s) IV Push once  droxidopa 600 milliGRAM(s) Oral every 8 hours  epoetin odalis (EPOGEN) Injectable 04449 Unit(s) IV Push <User Schedule>  ferrous    sulfate Liquid 300 milliGRAM(s) Oral daily  glucagon  Injectable 1 milliGRAM(s) IntraMuscular once  heparin   Injectable 5000 Unit(s) SubCutaneous every 8 hours  insulin lispro (ADMELOG) corrective regimen sliding scale   SubCutaneous every 6 hours  insulin NPH human recombinant 6 Unit(s) SubCutaneous every 6 hours  midodrine. 40 milliGRAM(s) Oral <User Schedule>  norepinephrine Infusion 0.05 MICROgram(s)/kG/Min (6.23 mL/Hr) IV Continuous <Continuous>  pantoprazole  Injectable 40 milliGRAM(s) IV Push two times a day  petrolatum Ophthalmic Ointment 1 Application(s) Both EYES four times a day  sodium chloride 1 Gram(s) Oral two times a day  sodium chloride 3%  Inhalation 4 milliLiter(s) Inhalation every 6 hours      VITALS:  T(F): 98.8 (11-19-23 @ 12:00), Max: 99 (11-18-23 @ 16:00)  HR: 100 (11-19-23 @ 13:00)  BP: --  RR: 24 (11-19-23 @ 13:00)  SpO2: 100% (11-19-23 @ 13:00)        11-17 @ 07:01  -  11-18 @ 07:00  --------------------------------------------------------  IN: 1100 mL / OUT: 1150 mL / NET: -50 mL    11-18 @ 07:01  -  11-19 @ 07:00  --------------------------------------------------------  IN: 810 mL / OUT: 150 mL / NET: 660 mL    11-19 @ 07:01  -  11-19 @ 13:40  --------------------------------------------------------  IN: 270 mL / OUT: 0 mL / NET: 270 mL        PHYSICAL EXAM:      LABS:  11-19    135  |  100  |  33<H>  ----------------------------<  124<H>  4.1   |  25  |  1.73<H>    Ca    8.8      19 Nov 2023 02:49  Phos  4.3     11-19  Mg     2.30     11-19    TPro  6.1  /  Alb  1.9<L>  /  TBili  <0.2  /  DBili      /  AST  18  /  ALT  12  /  AlkPhos  338<H>  11-19                          9.0    15.46 )-----------( 436      ( 19 Nov 2023 02:49 )             30.9

## 2023-11-19 NOTE — PROGRESS NOTE ADULT - SUBJECTIVE AND OBJECTIVE BOX
PROGRESS NOTE:   Authored by Dr. Ellen Granger MD (PGY-1). Pager [facility] [phone] / TRIS[place] ( [id number]) or via TEAMS    Patient is a 73y old  Female who presents with a chief complaint of Respiratory distress (18 Nov 2023 19:02)      SUBJECTIVE / OVERNIGHT EVENTS:  No acute events overnight.     ADDITIONAL REVIEW OF SYSTEMS:  Patient denies fevers, chills, chest pain, shortness of breath, nausea, abdominal pain, diarrhea, constipation, dysuria, leg swelling, headache, light headedness.    MEDICATIONS  (STANDING):  albuterol/ipratropium for Nebulization 3 milliLiter(s) Nebulizer every 6 hours  artificial tears (preservative free) Ophthalmic Solution 1 Drop(s) Both EYES every 4 hours  aspirin  chewable 81 milliGRAM(s) Enteral Tube daily  atorvastatin 10 milliGRAM(s) Oral at bedtime  calamine/zinc oxide Lotion 1 Application(s) Topical daily  chlorhexidine 0.12% Liquid 15 milliLiter(s) Oral Mucosa every 12 hours  chlorhexidine 2% Cloths 1 Application(s) Topical daily  chlorhexidine 4% Liquid 1 Application(s) Topical <User Schedule>  dextrose 5%. 1000 milliLiter(s) (50 mL/Hr) IV Continuous <Continuous>  dextrose 5%. 1000 milliLiter(s) (100 mL/Hr) IV Continuous <Continuous>  dextrose 50% Injectable 25 Gram(s) IV Push once  dextrose 50% Injectable 12.5 Gram(s) IV Push once  dextrose 50% Injectable 25 Gram(s) IV Push once  dextrose 50% Injectable 25 Gram(s) IV Push once  droxidopa 600 milliGRAM(s) Oral every 8 hours  epoetin odalis (EPOGEN) Injectable 82207 Unit(s) IV Push <User Schedule>  ferrous    sulfate Liquid 300 milliGRAM(s) Oral daily  glucagon  Injectable 1 milliGRAM(s) IntraMuscular once  heparin   Injectable 5000 Unit(s) SubCutaneous every 8 hours  insulin lispro (ADMELOG) corrective regimen sliding scale   SubCutaneous every 6 hours  insulin NPH human recombinant 6 Unit(s) SubCutaneous every 6 hours  midodrine. 40 milliGRAM(s) Oral <User Schedule>  norepinephrine Infusion 0.05 MICROgram(s)/kG/Min (6.23 mL/Hr) IV Continuous <Continuous>  pantoprazole  Injectable 40 milliGRAM(s) IV Push two times a day  petrolatum Ophthalmic Ointment 1 Application(s) Both EYES four times a day  sodium chloride 1 Gram(s) Oral two times a day  sodium chloride 3%  Inhalation 4 milliLiter(s) Inhalation every 6 hours    MEDICATIONS  (PRN):  acetaminophen   Oral Liquid .. 650 milliGRAM(s) Oral every 6 hours PRN Temp greater or equal to 38C (100.4F), Mild Pain (1 - 3)  dextrose Oral Gel 15 Gram(s) Oral once PRN Blood Glucose LESS THAN 70 milliGRAM(s)/deciliter  diphenhydrAMINE Elixir 50 milliGRAM(s) Oral once PRN Rash and/or Itching  droxidopa 600 milliGRAM(s) Oral <User Schedule> PRN prior to hemodialysis  midodrine. 40 milliGRAM(s) Oral once PRN 1 Hour Pre HD  sodium chloride 0.9% Bolus. 250 milliLiter(s) IV Bolus every 5 minutes PRN SBP LESS THAN or EQUAL to 90 mmHg  sodium chloride 0.9% lock flush 10 milliLiter(s) IV Push every 1 hour PRN Pre/post blood products, medications, blood draw, and to maintain line patency      CAPILLARY BLOOD GLUCOSE      POCT Blood Glucose.: 123 mg/dL (19 Nov 2023 05:13)  POCT Blood Glucose.: 119 mg/dL (18 Nov 2023 23:00)  POCT Blood Glucose.: 164 mg/dL (18 Nov 2023 17:07)  POCT Blood Glucose.: 135 mg/dL (18 Nov 2023 11:11)    I&O's Summary    18 Nov 2023 07:01  -  19 Nov 2023 07:00  --------------------------------------------------------  IN: 810 mL / OUT: 150 mL / NET: 660 mL        PHYSICAL EXAM:  Vital Signs Last 24 Hrs  T(C): 36.9 (19 Nov 2023 04:00), Max: 37.4 (18 Nov 2023 12:00)  T(F): 98.4 (19 Nov 2023 04:00), Max: 99.3 (18 Nov 2023 12:00)  HR: 92 (19 Nov 2023 07:00) (92 - 112)  BP: --  BP(mean): --  RR: 21 (19 Nov 2023 07:00) (18 - 25)  SpO2: 100% (19 Nov 2023 07:00) (96% - 100%)    Parameters below as of 19 Nov 2023 07:00  Patient On (Oxygen Delivery Method): ventilator    O2 Concentration (%): 40    CONSTITUTIONAL: NAD, well-developed  RESPIRATORY: Normal respiratory effort; lungs are clear to auscultation bilaterally  CARDIOVASCULAR: Regular rate and rhythm, normal S1 and S2, no murmur/rub/gallop; No lower extremity edema; Peripheral pulses are 2+ bilaterally  ABDOMEN: Nontender to palpation, normoactive bowel sounds, no rebound/guarding; No hepatosplenomegaly  MSK: no clubbing or cyanosis of digits; no joint swelling or tenderness to palpation  PSYCH: A+O to person, place, and time; affect appropriate    LABS:                        9.0    15.46 )-----------( 436      ( 19 Nov 2023 02:49 )             30.9     11-19    135  |  100  |  33<H>  ----------------------------<  124<H>  4.1   |  25  |  1.73<H>    Ca    8.8      19 Nov 2023 02:49  Phos  4.3     11-19  Mg     2.30     11-19    TPro  6.1  /  Alb  1.9<L>  /  TBili  <0.2  /  DBili  x   /  AST  18  /  ALT  12  /  AlkPhos  338<H>  11-19          Urinalysis Basic - ( 19 Nov 2023 02:49 )    Color: x / Appearance: x / SG: x / pH: x  Gluc: 124 mg/dL / Ketone: x  / Bili: x / Urobili: x   Blood: x / Protein: x / Nitrite: x   Leuk Esterase: x / RBC: x / WBC x   Sq Epi: x / Non Sq Epi: x / Bacteria: x        Culture - Acid Fast - Bronchial w/Smear (collected 16 Nov 2023 23:59)  Source: .Bronchial Bronchial Lavage  Preliminary Report (18 Nov 2023 15:09):    Culture is being performed.        Tele Reviewed:    RADIOLOGY & ADDITIONAL TESTS:  Results Reviewed:   Imaging Personally Reviewed:  Electrocardiogram Personally Reviewed:     PROGRESS NOTE:   Authored by Dr. Ellen Granger MD (PGY-1). Pager [facility] [phone] / [place] ( [id number]) or via TEAMS    Patient is a 73y old  Female who presents with a chief complaint of Respiratory distress (18 Nov 2023 19:02)      SUBJECTIVE / OVERNIGHT EVENTS:  No acute events overnight. Pt missed HD on Friday 11/17, currently without access for HD as triley placed on 11/17 with anatomic malfunction.     ADDITIONAL REVIEW OF SYSTEMS:  Patient denies fevers, chills, chest pain, shortness of breath, nausea, abdominal pain, diarrhea, constipation, dysuria, leg swelling, headache, light headedness.    MEDICATIONS  (STANDING):  albuterol/ipratropium for Nebulization 3 milliLiter(s) Nebulizer every 6 hours  artificial tears (preservative free) Ophthalmic Solution 1 Drop(s) Both EYES every 4 hours  aspirin  chewable 81 milliGRAM(s) Enteral Tube daily  atorvastatin 10 milliGRAM(s) Oral at bedtime  calamine/zinc oxide Lotion 1 Application(s) Topical daily  chlorhexidine 0.12% Liquid 15 milliLiter(s) Oral Mucosa every 12 hours  chlorhexidine 2% Cloths 1 Application(s) Topical daily  chlorhexidine 4% Liquid 1 Application(s) Topical <User Schedule>  dextrose 5%. 1000 milliLiter(s) (50 mL/Hr) IV Continuous <Continuous>  dextrose 5%. 1000 milliLiter(s) (100 mL/Hr) IV Continuous <Continuous>  dextrose 50% Injectable 25 Gram(s) IV Push once  dextrose 50% Injectable 12.5 Gram(s) IV Push once  dextrose 50% Injectable 25 Gram(s) IV Push once  dextrose 50% Injectable 25 Gram(s) IV Push once  droxidopa 600 milliGRAM(s) Oral every 8 hours  epoetin odalis (EPOGEN) Injectable 17365 Unit(s) IV Push <User Schedule>  ferrous    sulfate Liquid 300 milliGRAM(s) Oral daily  glucagon  Injectable 1 milliGRAM(s) IntraMuscular once  heparin   Injectable 5000 Unit(s) SubCutaneous every 8 hours  insulin lispro (ADMELOG) corrective regimen sliding scale   SubCutaneous every 6 hours  insulin NPH human recombinant 6 Unit(s) SubCutaneous every 6 hours  midodrine. 40 milliGRAM(s) Oral <User Schedule>  norepinephrine Infusion 0.05 MICROgram(s)/kG/Min (6.23 mL/Hr) IV Continuous <Continuous>  pantoprazole  Injectable 40 milliGRAM(s) IV Push two times a day  petrolatum Ophthalmic Ointment 1 Application(s) Both EYES four times a day  sodium chloride 1 Gram(s) Oral two times a day  sodium chloride 3%  Inhalation 4 milliLiter(s) Inhalation every 6 hours    MEDICATIONS  (PRN):  acetaminophen   Oral Liquid .. 650 milliGRAM(s) Oral every 6 hours PRN Temp greater or equal to 38C (100.4F), Mild Pain (1 - 3)  dextrose Oral Gel 15 Gram(s) Oral once PRN Blood Glucose LESS THAN 70 milliGRAM(s)/deciliter  diphenhydrAMINE Elixir 50 milliGRAM(s) Oral once PRN Rash and/or Itching  droxidopa 600 milliGRAM(s) Oral <User Schedule> PRN prior to hemodialysis  midodrine. 40 milliGRAM(s) Oral once PRN 1 Hour Pre HD  sodium chloride 0.9% Bolus. 250 milliLiter(s) IV Bolus every 5 minutes PRN SBP LESS THAN or EQUAL to 90 mmHg  sodium chloride 0.9% lock flush 10 milliLiter(s) IV Push every 1 hour PRN Pre/post blood products, medications, blood draw, and to maintain line patency      CAPILLARY BLOOD GLUCOSE      POCT Blood Glucose.: 123 mg/dL (19 Nov 2023 05:13)  POCT Blood Glucose.: 119 mg/dL (18 Nov 2023 23:00)  POCT Blood Glucose.: 164 mg/dL (18 Nov 2023 17:07)  POCT Blood Glucose.: 135 mg/dL (18 Nov 2023 11:11)    I&O's Summary    18 Nov 2023 07:01  -  19 Nov 2023 07:00  --------------------------------------------------------  IN: 810 mL / OUT: 150 mL / NET: 660 mL        PHYSICAL EXAM:  Vital Signs Last 24 Hrs  T(C): 36.9 (19 Nov 2023 04:00), Max: 37.4 (18 Nov 2023 12:00)  T(F): 98.4 (19 Nov 2023 04:00), Max: 99.3 (18 Nov 2023 12:00)  HR: 92 (19 Nov 2023 07:00) (92 - 112)  BP: --  BP(mean): --  RR: 21 (19 Nov 2023 07:00) (18 - 25)  SpO2: 100% (19 Nov 2023 07:00) (96% - 100%)    Parameters below as of 19 Nov 2023 07:00  Patient On (Oxygen Delivery Method): ventilator    O2 Concentration (%): 40    CONSTITUTIONAL: NAD, well-developed  RESPIRATORY: Normal respiratory effort; lungs are clear to auscultation bilaterally  CARDIOVASCULAR: Regular rate and rhythm, normal S1 and S2, no murmur/rub/gallop; 2+ edema diffusely.  Peripheral pulses are 2+ bilaterally  ABDOMEN: Nontender to palpation, normoactive bowel sounds, no rebound/guarding; No hepatosplenomegaly  MSK: no clubbing or cyanosis of digits; no joint swelling or tenderness to palpation    LABS:                        9.0    15.46 )-----------( 436      ( 19 Nov 2023 02:49 )             30.9     11-19    135  |  100  |  33<H>  ----------------------------<  124<H>  4.1   |  25  |  1.73<H>    Ca    8.8      19 Nov 2023 02:49  Phos  4.3     11-19  Mg     2.30     11-19    TPro  6.1  /  Alb  1.9<L>  /  TBili  <0.2  /  DBili  x   /  AST  18  /  ALT  12  /  AlkPhos  338<H>  11-19          Urinalysis Basic - ( 19 Nov 2023 02:49 )    Color: x / Appearance: x / SG: x / pH: x  Gluc: 124 mg/dL / Ketone: x  / Bili: x / Urobili: x   Blood: x / Protein: x / Nitrite: x   Leuk Esterase: x / RBC: x / WBC x   Sq Epi: x / Non Sq Epi: x / Bacteria: x        Culture - Acid Fast - Bronchial w/Smear (collected 16 Nov 2023 23:59)  Source: .Bronchial Bronchial Lavage  Preliminary Report (18 Nov 2023 15:09):    Culture is being performed.        Tele Reviewed:    RADIOLOGY & ADDITIONAL TESTS:  Results Reviewed:   Imaging Personally Reviewed:  Electrocardiogram Personally Reviewed:

## 2023-11-19 NOTE — CHART NOTE - NSCHARTNOTEFT_GEN_A_CORE
Risk of infection with shiley is higher than with a permcath.  Decision re placement of permcath is a risk benefit decision.  Further discussion per IR and ICU team

## 2023-11-19 NOTE — PROGRESS NOTE ADULT - SUBJECTIVE AND OBJECTIVE BOX
Patient is a 73y old  Female who presents with a chief complaint of Respiratory distress (19 Nov 2023 13:40)      SUBJECTIVE / OVERNIGHT EVENTS: no access for HD, PAC to be placed by IR, ID to clear 2/2 leukocytosis post bronch BAL. wbc is downtrending, ptn is afebrile.    MEDICATIONS  (STANDING):  albuterol/ipratropium for Nebulization 3 milliLiter(s) Nebulizer every 6 hours  artificial tears (preservative free) Ophthalmic Solution 1 Drop(s) Both EYES every 4 hours  aspirin  chewable 81 milliGRAM(s) Enteral Tube daily  atorvastatin 10 milliGRAM(s) Oral at bedtime  calamine/zinc oxide Lotion 1 Application(s) Topical daily  chlorhexidine 0.12% Liquid 15 milliLiter(s) Oral Mucosa every 12 hours  chlorhexidine 2% Cloths 1 Application(s) Topical daily  chlorhexidine 4% Liquid 1 Application(s) Topical <User Schedule>  dextrose 5%. 1000 milliLiter(s) (50 mL/Hr) IV Continuous <Continuous>  dextrose 5%. 1000 milliLiter(s) (100 mL/Hr) IV Continuous <Continuous>  dextrose 50% Injectable 25 Gram(s) IV Push once  dextrose 50% Injectable 12.5 Gram(s) IV Push once  dextrose 50% Injectable 25 Gram(s) IV Push once  dextrose 50% Injectable 25 Gram(s) IV Push once  droxidopa 600 milliGRAM(s) Oral every 8 hours  epoetin odalis (EPOGEN) Injectable 86026 Unit(s) IV Push <User Schedule>  ferrous    sulfate Liquid 300 milliGRAM(s) Oral daily  glucagon  Injectable 1 milliGRAM(s) IntraMuscular once  heparin   Injectable 5000 Unit(s) SubCutaneous every 8 hours  insulin lispro (ADMELOG) corrective regimen sliding scale   SubCutaneous every 6 hours  insulin NPH human recombinant 6 Unit(s) SubCutaneous every 6 hours  midodrine. 40 milliGRAM(s) Oral <User Schedule>  norepinephrine Infusion 0.05 MICROgram(s)/kG/Min (6.23 mL/Hr) IV Continuous <Continuous>  pantoprazole  Injectable 40 milliGRAM(s) IV Push two times a day  petrolatum Ophthalmic Ointment 1 Application(s) Both EYES four times a day  sodium chloride 1 Gram(s) Oral two times a day  sodium chloride 3%  Inhalation 4 milliLiter(s) Inhalation every 6 hours    MEDICATIONS  (PRN):  acetaminophen   Oral Liquid .. 650 milliGRAM(s) Oral every 6 hours PRN Temp greater or equal to 38C (100.4F), Mild Pain (1 - 3)  dextrose Oral Gel 15 Gram(s) Oral once PRN Blood Glucose LESS THAN 70 milliGRAM(s)/deciliter  diphenhydrAMINE Elixir 50 milliGRAM(s) Oral once PRN Rash and/or Itching  droxidopa 600 milliGRAM(s) Oral <User Schedule> PRN prior to hemodialysis  midodrine. 40 milliGRAM(s) Oral once PRN 1 Hour Pre HD  sodium chloride 0.9% Bolus. 250 milliLiter(s) IV Bolus every 5 minutes PRN SBP LESS THAN or EQUAL to 90 mmHg  sodium chloride 0.9% lock flush 10 milliLiter(s) IV Push every 1 hour PRN Pre/post blood products, medications, blood draw, and to maintain line patency      Vital Signs Last 24 Hrs  T(F): 98.5 (11-19-23 @ 16:00), Max: 98.9 (11-19-23 @ 00:00)  HR: 99 (11-19-23 @ 16:00) (92 - 112)  BP: --  RR: 24 (11-19-23 @ 16:00) (19 - 25)  SpO2: 100% (11-19-23 @ 16:00) (94% - 100%)  Telemetry:   CAPILLARY BLOOD GLUCOSE      POCT Blood Glucose.: 134 mg/dL (19 Nov 2023 12:02)  POCT Blood Glucose.: 123 mg/dL (19 Nov 2023 05:13)  POCT Blood Glucose.: 119 mg/dL (18 Nov 2023 23:00)  POCT Blood Glucose.: 164 mg/dL (18 Nov 2023 17:07)    I&O's Summary    18 Nov 2023 07:01  -  19 Nov 2023 07:00  --------------------------------------------------------  IN: 810 mL / OUT: 150 mL / NET: 660 mL    19 Nov 2023 07:01  -  19 Nov 2023 16:22  --------------------------------------------------------  IN: 330 mL / OUT: 0 mL / NET: 330 mL        PHYSICAL EXAM:  GENERAL: NAD, well-developed  HEAD:  Atraumatic, Normocephalic  EYES: EOMI, PERRLA, conjunctiva and sclera clear  NECK: Supple, No JVD  CHEST/LUNG: Clear to auscultation bilaterally; No wheeze  HEART: Regular rate and rhythm; No murmurs, rubs, or gallops  ABDOMEN: Soft, Nontender, Nondistended; Bowel sounds present  EXTREMITIES:  2+ Peripheral Pulses, No clubbing, cyanosis, or edema  PSYCH: AAOx3  NEUROLOGY: non-focal  SKIN: No rashes or lesions    LABS:                        9.0    15.46 )-----------( 436      ( 19 Nov 2023 02:49 )             30.9     11-19    135  |  100  |  33<H>  ----------------------------<  124<H>  4.1   |  25  |  1.73<H>    Ca    8.8      19 Nov 2023 02:49  Phos  4.3     11-19  Mg     2.30     11-19    TPro  6.1  /  Alb  1.9<L>  /  TBili  <0.2  /  DBili  x   /  AST  18  /  ALT  12  /  AlkPhos  338<H>  11-19          Urinalysis Basic - ( 19 Nov 2023 02:49 )    Color: x / Appearance: x / SG: x / pH: x  Gluc: 124 mg/dL / Ketone: x  / Bili: x / Urobili: x   Blood: x / Protein: x / Nitrite: x   Leuk Esterase: x / RBC: x / WBC x   Sq Epi: x / Non Sq Epi: x / Bacteria: x        RADIOLOGY & ADDITIONAL TESTS:    Imaging Personally Reviewed:    Consultant(s) Notes Reviewed:      Care Discussed with Consultants/Other Providers:

## 2023-11-19 NOTE — PROGRESS NOTE ADULT - SUBJECTIVE AND OBJECTIVE BOX
Subjective: Patient seen and examined. No new events except as noted.   remains in ICU       REVIEW OF SYSTEMS:    Unable to obtain  MEDICATIONS:  MEDICATIONS  (STANDING):  albuterol/ipratropium for Nebulization 3 milliLiter(s) Nebulizer every 6 hours  artificial tears (preservative free) Ophthalmic Solution 1 Drop(s) Both EYES every 4 hours  aspirin  chewable 81 milliGRAM(s) Enteral Tube daily  atorvastatin 10 milliGRAM(s) Oral at bedtime  calamine/zinc oxide Lotion 1 Application(s) Topical daily  chlorhexidine 0.12% Liquid 15 milliLiter(s) Oral Mucosa every 12 hours  chlorhexidine 2% Cloths 1 Application(s) Topical daily  chlorhexidine 4% Liquid 1 Application(s) Topical <User Schedule>  dextrose 5%. 1000 milliLiter(s) (100 mL/Hr) IV Continuous <Continuous>  dextrose 5%. 1000 milliLiter(s) (50 mL/Hr) IV Continuous <Continuous>  dextrose 50% Injectable 25 Gram(s) IV Push once  dextrose 50% Injectable 12.5 Gram(s) IV Push once  dextrose 50% Injectable 25 Gram(s) IV Push once  dextrose 50% Injectable 25 Gram(s) IV Push once  droxidopa 600 milliGRAM(s) Oral every 8 hours  epoetin odalis (EPOGEN) Injectable 35190 Unit(s) IV Push <User Schedule>  ferrous    sulfate Liquid 300 milliGRAM(s) Oral daily  glucagon  Injectable 1 milliGRAM(s) IntraMuscular once  heparin   Injectable 5000 Unit(s) SubCutaneous every 8 hours  insulin lispro (ADMELOG) corrective regimen sliding scale   SubCutaneous every 6 hours  insulin NPH human recombinant 6 Unit(s) SubCutaneous every 6 hours  midodrine. 40 milliGRAM(s) Oral <User Schedule>  norepinephrine Infusion 0.05 MICROgram(s)/kG/Min (6.23 mL/Hr) IV Continuous <Continuous>  pantoprazole  Injectable 40 milliGRAM(s) IV Push two times a day  petrolatum Ophthalmic Ointment 1 Application(s) Both EYES four times a day  sodium chloride 1 Gram(s) Oral two times a day  sodium chloride 3%  Inhalation 4 milliLiter(s) Inhalation every 6 hours      PHYSICAL EXAM:  T(C): 36.6 (11-19-23 @ 08:00), Max: 37.4 (11-18-23 @ 12:00)  HR: 106 (11-19-23 @ 08:00) (92 - 112)  BP: --  RR: 24 (11-19-23 @ 08:00) (18 - 25)  SpO2: 100% (11-19-23 @ 08:00) (96% - 100%)  Wt(kg): --  I&O's Summary    18 Nov 2023 07:01  -  19 Nov 2023 07:00  --------------------------------------------------------  IN: 810 mL / OUT: 150 mL / NET: 660 mL    19 Nov 2023 07:01  -  19 Nov 2023 08:42  --------------------------------------------------------  IN: 90 mL / OUT: 0 mL / NET: 90 mL            Appearance: NAD, +trach  +NGT  HEENT: dry oral mucosa  Lymphatic: No lymphadenopathy  Cardiovascular: Normal S1 S2, No JVD, No murmurs, No edema  Respiratory: Decreased BS, + trach to vent	  Neuro: opens eyes  Gastrointestinal: Soft, Non-tender, + BS, + NGT  Skin: No rashes, No ecchymoses, No cyanosis	  Extremities: No strength/ROM 2/2 sedation, + BL LE edema  Vascular: Peripheral pulses palpable 2+ bilaterally  Anasarca     Mode: AC/ CMV (Assist Control/ Continuous Mandatory Ventilation), RR (machine): 24, FiO2: 40, PEEP: 8, ITime: 0.8, MAP: 19, PC: 35, PIP: 38        LABS:    CARDIAC MARKERS:                                9.0    15.46 )-----------( 436      ( 19 Nov 2023 02:49 )             30.9     11-19    135  |  100  |  33<H>  ----------------------------<  124<H>  4.1   |  25  |  1.73<H>    Ca    8.8      19 Nov 2023 02:49  Phos  4.3     11-19  Mg     2.30     11-19    TPro  6.1  /  Alb  1.9<L>  /  TBili  <0.2  /  DBili  x   /  AST  18  /  ALT  12  /  AlkPhos  338<H>  11-19    proBNP:   Lipid Profile:   HgA1c:   TSH:             TELEMETRY: 	 SR    ECG:  	  RADIOLOGY:   DIAGNOSTIC TESTING:  [ ] Echocardiogram:  [ ]  Catheterization:  [ ] Stress Test:    OTHER:

## 2023-11-19 NOTE — PROGRESS NOTE ADULT - ASSESSMENT
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       NEUROLOGY  #AMS  # Multiple embolic strokes   # L PCA Infarct   MR head performed w/ new infarct and old infarcts, EEG without seizure events but with high foci potential   - EEG given facial twitching: negative for epileptiform activity (10/31)  - deferring repeat MRI as unlikely to   - baseline  AOX3 at home per daughter, currently spontaneously opening eyes, localized to voice and pain, trached/non-verbal   - Keppra stopped 11/10 (was on as seizure ppx given multiple infarcts, no seizures on multiple EEG, stopped to ensure it is not contributing to sedation), the same mental status, -- opens eyes spontaneously, and shakes her head intermittently, not following commands    - aspirin held 10/11, likely in s/o GIB, Hgb stable not requiring PRBC since 11/4, ASA 81 mg restarted on 11/14  - continue Lipitor    CARDIOVASCULAR  # Septic vs vasoplegic shock   Etiology likely septic iso active infection. Possible component of vasoplegic, however no longer with active infection or on sedation. Off IV vasopressors.   Current regimen:   - droxidopa 600mg q8h with PRN 600mg ordered preHD  - midodrine 40mg q6 h; trial of additional 40mg prior to HD  - required levophed up to 0.14 during HD 11/15  weaned off after dialysis session, will need additional midodrine/droxidopa dose preHD    # HFpEF w/ decompensation   > ECHO w/ EF 59 with severe LVH and diastolic dysfunction   - fluid overloaded, HD to remove fluids     HEENT   # Left ear OE with acute on chronic left-sided otomastoiditis, s/p Abx (see in ID)   - noted to have white discharged/residue, increased from prior per patient's daughter; ENT reconsulted, resumed on cipro drops (10/31 - 11/7),   # 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   - resolved    RESPIRATORY  # AHRF second to volume overload   # Copious inline and oral thick tanned secretions   - CKD vs HFpEF w/ hypercarbia 2/2 volume overload requiring intubation, trach, and HD initiation  - Continue on albuterol and chest PT, cont 3 % INH, added IPV  - Continue volume removal with HD  - hypoxemia w/ turning and repositioning, CXR 11/16 shows moderate right pleural effusion, worsening RUL atelectasis, left has small effusion and/or atelectasis. Plan for flexible bronchoscopy and possible thoracentesis  - CXR 11/16 shows moderate right pleural effusion with suggestion of worsening RUL atelectasis, small left effusion and/or atelectasis.  - Bronchoscopy bedside 11/16 noted with white secretions right > left, BAL sent     #tracheomalacia   - CT CHEST (9/8) with tracheomalacia, BL pleural effusions and persistent consolidations     GI  # GIB: last pRBC transfused 11/4 (10/14 before that)   - Continue on PPI BID  - patient had melena before 11/13 but H/H stable,  Eliquis was held for dark brown/black stool  - as per nursing staff, stool is black 100 in the rectal system in 24 hrs     # Dysphagia   - NGT- tolerating feeds, at goal- changed to nepro was per nutrition    RENAL  # ESRD, L ARTHUR taylor   - HD MWF TIW with midodrine and droxidopa   - ICU for vasopressor assisted volume removal, cap to 1 pressor and not offering CRRT due to the comorbidities and prognosis   - f/u w/ nephrology: will continue to offer patient 1-pressor assisted HD as agreed prior, as long as she can tolerate HD maxed on 1 pressor, Levo to 0.3; she will be considered iHD candidate  - IR was planning PermCath 11/14; but procedure was cancelled due to pt in MICU, on vasopressors, and has leucocytosis  - [facility] ilene discontinued 11/16 for line holiday, trialysis catheter placed in [facility] on 11/17 however was removed 11/18 iso poor flow rates during HD.      INFECTIOUS DISEASE  #Septic shock  , afebrile,  WBC - 15>27, LA wnls  #blood cx 9/1: MSSA with hypotension  repeat negative 9/4, 9/8 s/p 4 weeks of vanco/dapto through 10/2  #Colonized with  pseudomonas   # MSSA/ESBL E Coli PNA  # L otitis Externa s/p ENT debridement c/b Candida, s/p Meropenem and Fluconazole  # Proteus/ Pseudomonas in sputum, s/p Aztreonam   - recent Bcx 11/2 negative, sputum 11/2 with  pseudomonas- colonized   - repeat MRSA PCR neg  - afebrile now after completing course aztreonam 11/6  - monitoring off abx   - f/u BAL (11/16)- only afb was sent which is negative.     # possible malignant otitis externa   # ESBL ECOLI and MSSA VAP c/b MSSA bacteremia   - hx of MSSA bacteremia, ESBL E. Coli sputum Cx, BAL w/ MSSA  - treated with abx   - eosinophilia workup: JORGE, ANCAs negative    HEME  # Anemia second to GIB vs renal disease   - multiple transfusions, last done 11/4  - Eliquis held 2/2 pt having melena, and now pt with black stools/dark brown  - 11/14  restarted ASA 81 mg   - restarted Heparin SQ prophylaxis 11/14 today, will continue to hold off starting ac given melena.    #Allergic transfusion reaction: per RCU documentation, developed erythematous rash across chest shortly after starting transfusion, blood bank consulted and diagnosed mild allergic rxn  - premedicate w/ benadryl and famotidine prior to future transfusions  - no issue w/ pRBC transfusion 11/4    VASCULAR   # LLE POP DVT   - on Eliquis (held as of 11/11 PM for procedure and also patient having melena), stools are black/dark brown  - restarted Heparin SQ prophylaxis 11/14 and will continue to hold off starting Eliquis given melena.  - SCDs    # HD access  - [facility] (9/27 - 10/21)  - [facility] shiley replaced for HD (10/24 -11/15 ) L IJ shiley was removed today after HD and new line will be placed when pt need next HD session, will need Trialysis Jagrutiley line, which is at the bedside and consent obtained from the family   - Permacath was planned on 11/14, but cancelled 2/2 pt in MICU, requiring pressors, and + leucocytosis   - L [facility] Trialysis catheter placed 11/17    ONC   # Hx of Breast CA   - Patient dx in 2018 and s/p B/L mastectomy (radical on right) and chemo and RT.     ENDOCRINE  # IDDM2   - Continue on NPH 6U with moderate ISS   - Adjust as needed      ETHICS/ GOC    - Attempted palliative discussion however family not interested and wishes for FULL CODE  - Family continues to want everything done despite inability to tolerate HD on multiple oral pressor  - Levo capped at 0.3 during HD     11/17- updated pts spouse over the phone and daughter at bedside.  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       NEUROLOGY  #AMS  # Multiple embolic strokes   # L PCA Infarct   MR head performed w/ new infarct and old infarcts, EEG without seizure events but with high foci potential   - EEG given facial twitching: negative for epileptiform activity (10/31)  - deferring repeat MRI as unlikely to   - baseline  AOX3 at home per daughter, currently spontaneously opening eyes, localized to voice and pain, trached/non-verbal   - Keppra stopped 11/10 (was on as seizure ppx given multiple infarcts, no seizures on multiple EEG, stopped to ensure it is not contributing to sedation), the same mental status, -- opens eyes spontaneously, and shakes her head intermittently, not following commands    - aspirin held 10/11, likely in s/o GIB, Hgb stable not requiring PRBC since 11/4, ASA 81 mg restarted on 11/14  - continue Lipitor    CARDIOVASCULAR  # Septic vs vasoplegic shock   Etiology likely septic iso active infection. Possible component of vasoplegic, however no longer with active infection or on sedation. Off IV vasopressors.   Current regimen:   - droxidopa 600mg q8h with PRN 600mg ordered preHD  - midodrine 40mg q6 h; trial of additional 40mg prior to HD  - required levophed up to 0.14 during HD 11/15  weaned off after dialysis session, will need additional midodrine/droxidopa dose preHD    # HFpEF w/ decompensation   > ECHO w/ EF 59 with severe LVH and diastolic dysfunction   - fluid overloaded, HD to remove fluids     HEENT   # Left ear OE with acute on chronic left-sided otomastoiditis, s/p Abx (see in ID)   - noted to have white discharged/residue, increased from prior per patient's daughter; ENT reconsulted, resumed on cipro drops (10/31 - 11/7),   # 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   - resolved    RESPIRATORY  # AHRF second to volume overload   # Copious inline and oral thick tanned secretions   - CKD vs HFpEF w/ hypercarbia 2/2 volume overload requiring intubation, trach, and HD initiation  - Continue on albuterol and chest PT, cont 3 % INH, added IPV  - Continue volume removal with HD  - hypoxemia w/ turning and repositioning, CXR 11/16 shows moderate right pleural effusion, worsening RUL atelectasis, left has small effusion and/or atelectasis. Plan for flexible bronchoscopy and possible thoracentesis  - CXR 11/16 shows moderate right pleural effusion with suggestion of worsening RUL atelectasis, small left effusion and/or atelectasis.  - Bronchoscopy bedside 11/16 noted with white secretions right > left, BAL sent     #tracheomalacia   - CT CHEST (9/8) with tracheomalacia, BL pleural effusions and persistent consolidations     GI  # GIB: last pRBC transfused 11/4 (10/14 before that)   - Continue on PPI BID  - patient had melena before 11/13 but H/H stable,  Eliquis was held for dark brown/black stool  - as per nursing staff, stool is black 100 in the rectal system in 24 hrs     # Dysphagia   - NGT- tolerating feeds, at goal- changed to nepro was per nutrition    RENAL  # ESRD, L ARTHUR odom   - HD MWF TIW with midodrine and droxidopa   - ICU for vasopressor assisted volume removal, cap to 1 pressor and not offering CRRT due to the comorbidities and prognosis   - f/u w/ nephrology: will continue to offer patient 1-pressor assisted HD as agreed prior, as long as she can tolerate HD maxed on 1 pressor, Levo to 0.3; she will be considered iHD candidate  - IR was planning PermCath 11/14; but procedure was cancelled due to pt in MICU, on vasopressors, and has leucocytosis  - [facility] ilene discontinued 11/16 for line holiday, trialysis catheter placed in [facility] on 11/17 however was removed 11/18 iso poor flow rates during HD.  - IR re-consulted for permacath on 11/19- requiring ID to clear patient given WBC       INFECTIOUS DISEASE  #Septic shock  , afebrile,  WBC - 15>27, LA wnls  #blood cx 9/1: MSSA with hypotension  repeat negative 9/4, 9/8 s/p 4 weeks of vanco/dapto through 10/2  #Colonized with  pseudomonas   # MSSA/ESBL E Coli PNA  # L otitis Externa s/p ENT debridement c/b Candida, s/p Meropenem and Fluconazole  # Proteus/ Pseudomonas in sputum, s/p Aztreonam   - recent Bcx 11/2 negative, sputum 11/2 with  pseudomonas- colonized   - repeat MRSA PCR neg  - afebrile now after completing course aztreonam 11/6  - monitoring off abx   - f/u BAL (11/16)- only afb was sent which is negative.     # possible malignant otitis externa   # ESBL ECOLI and MSSA VAP c/b MSSA bacteremia   - hx of MSSA bacteremia, ESBL E. Coli sputum Cx, BAL w/ MSSA  - treated with abx   - eosinophilia workup: JORGE, ANCAs negative    HEME  # Anemia second to GIB vs renal disease   - multiple transfusions, last done 11/4  - Eliquis held 2/2 pt having melena, and now pt with black stools/dark brown  - 11/14  restarted ASA 81 mg   - restarted Heparin SQ prophylaxis 11/14 today, will continue to hold off starting ac given melena.    #Allergic transfusion reaction: per RCU documentation, developed erythematous rash across chest shortly after starting transfusion, blood bank consulted and diagnosed mild allergic rxn  - premedicate w/ benadryl and famotidine prior to future transfusions  - no issue w/ pRBC transfusion 11/4    VASCULAR   # LLE POP DVT   - on Eliquis (held as of 11/11 PM for procedure and also patient having melena), stools are black/dark brown  - restarted Heparin SQ prophylaxis 11/14 and will continue to hold off starting Eliquis given melena.  - SCDs    # HD access  - [facility] (9/27 - 10/21)  - [facility] shiley replaced for HD (10/24 -11/15 ) L IJ shiley was removed today after HD and new line will be placed when pt need next HD session, will need Trialysis Shiley line, which is at the bedside and consent obtained from the family   - Permacath was planned on 11/14, but cancelled 2/2 pt in MICU, requiring pressors, and + leucocytosis   - L [facility] Trialysis catheter placed 11/17    ONC   # Hx of Breast CA   - Patient dx in 2018 and s/p B/L mastectomy (radical on right) and chemo and RT.     ENDOCRINE  # IDDM2   - Continue on NPH 6U with moderate ISS   - Adjust as needed      ETHICS/ GOC    - Attempted palliative discussion however family not interested and wishes for FULL CODE  - Family continues to want everything done despite inability to tolerate HD on multiple oral pressor  - Levo capped at 0.3 during HD     11/17- updated pts spouse over the phone and daughter at bedside.  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       NEUROLOGY  #AMS  # Multiple embolic strokes   # L PCA Infarct   MR head performed w/ new infarct and old infarcts, EEG without seizure events but with high foci potential   - EEG given facial twitching: negative for epileptiform activity (10/31)  - deferring repeat MRI as unlikely to   - baseline  AOX3 at home per daughter, currently spontaneously opening eyes, localized to voice and pain, trached/non-verbal   - Keppra stopped 11/10 (was on as seizure ppx given multiple infarcts, no seizures on multiple EEG, stopped to ensure it is not contributing to sedation), the same mental status, -- opens eyes spontaneously, and shakes her head intermittently, not following commands    - aspirin held 10/11, likely in s/o GIB, Hgb stable not requiring PRBC since 11/4, ASA 81 mg restarted on 11/14  - continue Lipitor    CARDIOVASCULAR  # Septic vs vasoplegic shock   Etiology likely septic iso active infection. Possible component of vasoplegic, however no longer with active infection or on sedation. Off IV vasopressors.   Current regimen:   - droxidopa 600mg q8h with PRN 600mg ordered preHD  - midodrine 40mg q6 h; trial of additional 40mg prior to HD  - required levophed up to 0.14 during HD 11/15  weaned off after dialysis session, will need additional midodrine/droxidopa dose preHD    # HFpEF w/ decompensation   > ECHO w/ EF 59 with severe LVH and diastolic dysfunction   - fluid overloaded, HD to remove fluids     HEENT   # Left ear OE with acute on chronic left-sided otomastoiditis, s/p Abx (see in ID)   - noted to have white discharged/residue, increased from prior per patient's daughter; ENT reconsulted, resumed on cipro drops (10/31 - 11/7),   # 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   - resolved    RESPIRATORY  # AHRF second to volume overload   # Copious inline and oral thick tanned secretions   - CKD vs HFpEF w/ hypercarbia 2/2 volume overload requiring intubation, trach, and HD initiation  - Continue on albuterol and chest PT, cont 3 % INH, added IPV  - Continue volume removal with HD  - hypoxemia w/ turning and repositioning, CXR 11/16 shows moderate right pleural effusion, worsening RUL atelectasis, left has small effusion and/or atelectasis. Plan for flexible bronchoscopy and possible thoracentesis  - CXR 11/16 shows moderate right pleural effusion with suggestion of worsening RUL atelectasis, small left effusion and/or atelectasis.  - Bronchoscopy bedside 11/16 noted with white secretions right > left, BAL sent     #tracheomalacia   - CT CHEST (9/8) with tracheomalacia, BL pleural effusions and persistent consolidations     GI  # GIB: last pRBC transfused 11/4 (10/14 before that)   - Continue on PPI BID  - patient had melena before 11/13 but H/H stable,  Eliquis was held for dark brown/black stool  - as per nursing staff, stool is black 100 in the rectal system in 24 hrs     # Dysphagia   - NGT- tolerating feeds, at goal- changed to nepro was per nutrition    RENAL  # ESRD, L ARTHUR odom   - HD MWF TIW with midodrine and droxidopa   - ICU for vasopressor assisted volume removal, cap to 1 pressor and not offering CRRT due to the comorbidities and prognosis   - f/u w/ nephrology: will continue to offer patient 1-pressor assisted HD as agreed prior, as long as she can tolerate HD maxed on 1 pressor, Levo to 0.3; she will be considered iHD candidate  - IR was planning PermCath 11/14; but procedure was cancelled due to pt in MICU, on vasopressors, and has leucocytosis  - [facility] ilene discontinued 11/16 for line holiday, trialysis catheter placed in [facility] on 11/17 however was removed 11/18 iso poor flow rates during HD.  - IR re-consulted for permacath on 11/19- is requiring ID to clear patient given WBC  - as per ID recs- WBC downtrending      INFECTIOUS DISEASE  #Septic shock  , afebrile,  WBC - 15>27, LA wnls  #blood cx 9/1: MSSA with hypotension  repeat negative 9/4, 9/8 s/p 4 weeks of vanco/dapto through 10/2  #Colonized with  pseudomonas   # MSSA/ESBL E Coli PNA  # L otitis Externa s/p ENT debridement c/b Candida, s/p Meropenem and Fluconazole  # Proteus/ Pseudomonas in sputum, s/p Aztreonam   - recent Bcx 11/2 negative, sputum 11/2 with  pseudomonas- colonized   - repeat MRSA PCR neg  - afebrile now after completing course aztreonam 11/6  - monitoring off abx   - f/u BAL (11/16)- only afb was sent which is negative.     # possible malignant otitis externa   # ESBL ECOLI and MSSA VAP c/b MSSA bacteremia   - hx of MSSA bacteremia, ESBL E. Coli sputum Cx, BAL w/ MSSA  - treated with abx   - eosinophilia workup: JORGE, ANCAs negative    HEME  # Anemia second to GIB vs renal disease   - multiple transfusions, last done 11/4  - Eliquis held 2/2 pt having melena, and now pt with black stools/dark brown  - 11/14  restarted ASA 81 mg   - restarted Heparin SQ prophylaxis 11/14 today, will continue to hold off starting ac given melena.    #Allergic transfusion reaction: per RCU documentation, developed erythematous rash across chest shortly after starting transfusion, blood bank consulted and diagnosed mild allergic rxn  - premedicate w/ benadryl and famotidine prior to future transfusions  - no issue w/ pRBC transfusion 11/4    VASCULAR   # LLE POP DVT   - on Eliquis (held as of 11/11 PM for procedure and also patient having melena), stools are black/dark brown  - restarted Heparin SQ prophylaxis 11/14 and will continue to hold off starting Eliquis given melena.  - SCDs    # HD access  - [facility] (9/27 - 10/21)  - [facility]J shiley replaced for HD (10/24 -11/15 ) L IJ shiley was removed today after HD and new line will be placed when pt need next HD session, will need Trialysis Shiley line, which is at the bedside and consent obtained from the family   - Permacath was planned on 11/14, but cancelled 2/2 pt in MICU, requiring pressors, and + leucocytosis   - L [facility] Trialysis catheter placed 11/17    ONC   # Hx of Breast CA   - Patient dx in 2018 and s/p B/L mastectomy (radical on right) and chemo and RT.     ENDOCRINE  # IDDM2   - Continue on NPH 6U with moderate ISS   - Adjust as needed      ETHICS/ GOC    - Attempted palliative discussion however family not interested and wishes for FULL CODE  - Family continues to want everything done despite inability to tolerate HD on multiple oral pressor  - Levo capped at 0.3 during HD     11/17- updated pts spouse over the phone and daughter at bedside.

## 2023-11-19 NOTE — CHART NOTE - NSCHARTNOTEFT_GEN_A_CORE
Patient being seen for malnutrition follow up. Spoke with RN and obtained subjective information from extensive chart review.     Nutrition Interval Events: Pt continues on TF without any noted intolerance to ordered TF at this time (no nausea/vomiting or abdominal distention); Flexiseal in place with 150 mL output over the past 24 hrs. Weight trend reflective of fluid shifts with edema presently 3+ generalized. FS over the past 24 hrs 91 - 164 mg/dl with NPH and Admelog insulins ordered for coverage. Pt remains at severe risk for malnutrition based on inadequate energy intake and ongoing moderate fluid accumulation. Suggest continuing with nutrition plan of care as ordered. RDN services to remain available as needed.     CURRENT Diet, NPO with Tube Feed:   Tube Feeding Modality: Nasogastric  Nepro with Carb Steady (NEPRORTH)  Total Volume for 24 Hours (mL): 720  Continuous  Starting Tube Feed Rate {mL per Hour}: 30  Increase Tube Feed Rate by (mL): 0     Every 4 hours  Until Goal Tube Feed Rate (mL per Hour): 30  Tube Feed Duration (in Hours): 24  Tube Feed Start Time: 17:25  Liquid Protein Supplement     Qty per Day:  2 (11-13-23 @ 17:25)    TF provides:  720 mL total volume  1296 kcals (+200 kcals LPS = 1496 kcals/day)  58 gm protein (+30 gm LPS = 88 gm total protein/day)  523 mL free water     Weight:                    Height:   61"                Upper 10% Ideal Body Weight:  115.5lbs / 52.5kg   74kg (11/19)  74.6kg (11/17)  67.1kg (11/16)  78.8kg (11/6)  78.5kg (11/3)  80.4kg (10/31)  78.2kg (10/19)  78.5kg (10/2)  83.2kg (9/24)  67.8kg (9/10)  58.0kg (8/31)  66.5kg (8/11 on admit)    __________________ Pertinent Medications__________________   MEDICATIONS  (STANDING):  albuterol/ipratropium for Nebulization 3 milliLiter(s) Nebulizer every 6 hours  artificial tears (preservative free) Ophthalmic Solution 1 Drop(s) Both EYES every 4 hours  aspirin  chewable 81 milliGRAM(s) Enteral Tube daily  atorvastatin 10 milliGRAM(s) Oral at bedtime  calamine/zinc oxide Lotion 1 Application(s) Topical daily  chlorhexidine 0.12% Liquid 15 milliLiter(s) Oral Mucosa every 12 hours  chlorhexidine 2% Cloths 1 Application(s) Topical daily  chlorhexidine 4% Liquid 1 Application(s) Topical <User Schedule>  dextrose 5%. 1000 milliLiter(s) (100 mL/Hr) IV Continuous <Continuous>  dextrose 5%. 1000 milliLiter(s) (50 mL/Hr) IV Continuous <Continuous>  dextrose 50% Injectable 25 Gram(s) IV Push once  dextrose 50% Injectable 12.5 Gram(s) IV Push once  dextrose 50% Injectable 25 Gram(s) IV Push once  dextrose 50% Injectable 25 Gram(s) IV Push once  droxidopa 600 milliGRAM(s) Oral every 8 hours  epoetin odalis (EPOGEN) Injectable 40023 Unit(s) IV Push <User Schedule>  ferrous    sulfate Liquid 300 milliGRAM(s) Oral daily  glucagon  Injectable 1 milliGRAM(s) IntraMuscular once  heparin   Injectable 5000 Unit(s) SubCutaneous every 8 hours  insulin lispro (ADMELOG) corrective regimen sliding scale   SubCutaneous every 6 hours  insulin NPH human recombinant 6 Unit(s) SubCutaneous every 6 hours  midodrine. 40 milliGRAM(s) Oral <User Schedule>  norepinephrine Infusion 0.05 MICROgram(s)/kG/Min (6.23 mL/Hr) IV Continuous <Continuous>  pantoprazole  Injectable 40 milliGRAM(s) IV Push two times a day  petrolatum Ophthalmic Ointment 1 Application(s) Both EYES four times a day  sodium chloride 1 Gram(s) Oral two times a day  sodium chloride 3%  Inhalation 4 milliLiter(s) Inhalation every 6 hours    __________________ Pertinent Labs__________________   11-19 Na 135 mmol/L Glu 124 mg/dL<H> K+ 4.1 mmol/L Cr 1.73 mg/dL<H> BUN 33 mg/dL<H> Phos 4.3 mg/dL  11-19 @ 12:02 POCT 134 mg/dL  11-19 @ 05:13 POCT 123 mg/dL  11-18 @ 23:00 POCT 119 mg/dL  11-18 @ 17:07 POCT 164 mg/dL      Skin: L inner ear wound, L lower calf wound      _____Estimated Energy Needs (based upper 10% Ideal Body Weight)_____  25-30 kcals/kg = 0348-2822 kcals/d  1.5-1.8.g protein/kg = 80-95g protein/d      Nutrition Diagnosis: Severe malnutrition  [x] ongoing    Goal(s):  1. Patient to meet > 75% estimated energy needs    Recommendations:   1. Continue with nutrition plan of care as ordered.    Monitoring and Evaluation:   1. Monitor weights, labs, BMs, skin integrity, enteral tolerance and edema.  2. RD services to remain available.     Education:  [x] Not warranted at present    Nelia oKhli, MS, RDN, CDN, CNSC on [place] TEAMS or Pager #[phone].

## 2023-11-19 NOTE — PROGRESS NOTE ADULT - ASSESSMENT
74 y/[facility] F [facility] she was admitted at [facility] 7/12-7/22 w aspiration PNA, was treated w CEFEPIME, developed an allergic rash,  dCHF, + MAC on AFB culture, had been progressively getting more and more lethargic and dyspneic at home since DC.   In  am of 8/11/23  ptn presented with respiratory distress w hypoxia and hypercarbia requiring intubation 2/2 volume overload +/- Asp PNA      Neuro   not responsive  Baseline MS AOx3, aphasic   - h/o CVA , on aspirin and statin . resumed w feeding tube, ASA resumed 8/26  - eeg  2/2 tremors, no sz focus, right facial twitching, EEG has been negative. 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 probably embolic in nature, did not tolerate full AC in the past, STEPHANIE is neg , no shunts observed  - ptn is poorly reactive, rpt scan done, no further CVA seen.     Cardiac   cardiology following  CHFpEF   TTE 7/2023 with EF 59%, with severe LVH and diastolic dysfunction   off Levo drip , now only during HD  on midodrine 40 mg qid, droxidopa 600 tid, additional 200 mg prior to HD,     Pulmonary   Acute hypercapnic and hypoxemic respiratory failure   prolonged intubation, now  trache to  vent, has copious secretions      Renal   on HD via L IJ Shiley, tunneled catheter when clinically stable  HD MWF, midodrine and pressor assisted,   permacath  scheduled to be placed in IR   presently no HD access  L IJ shiley is out      GI  NPO  on tube feeds  UGI bleed 8/31,  EGD/Colonoscopy: erosive gastropathy, esophagitis on colonoscopy old blood seen no active bleeding, poor prep  NGT-TF, Nepro  s/p 2UPRBC 11/4    Endocrine  on Insulin: NPH, Admelog    ID   complicated infectious hospital course  treated for MSSA bacteremia, aspiration PNA.  sputum + for pseudomonas, ptn was initially on zosyn but was allergic, then was switched to aztreonam   Aztreonam was switched to polymyxin for a possible allergy but ptn didnt have a reaction to aztreonam, she had a reaction to Zosyn.   spike temps again while off Abx, Aztreonam resumed 10/17, DCed 10/29  tmax 104.6 on 11/2-11/5, Cx NTD  leukocytosis is down today to 15 from 27K on 11/17. presumed this is 2/2 BAL via bronch on 11/16. afebrile  cont observing off ABx. 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 externa resolved    Heme/Onc  on eliquis for  LEFT POPLITEAL VEIN ACUTE DVT , on ELiquis    Ethics  GOC - Discussed GOC with daughter and , they have opted in the past for full code. and she remains full code at present

## 2023-11-19 NOTE — PROGRESS NOTE ADULT - PROBLEM SELECTOR PLAN 1
Multifactorial     - Aspiration, acute on chronic diastolic CHF   s/p trach 9/1  now with likely sepsis   completed  Abx

## 2023-11-19 NOTE — PROGRESS NOTE ADULT - ATTENDING COMMENTS
pt with very complex medical history as above. Active issues is dialysis access. pt has hx of right IJ venous occlusion, left shiley was removed yesterday due to malpositioning and poor blood flow. Today, unable to place new left Trialysis catheter due to left IJ clot. Overall prognosis is very poor, still requires PO and IV pressor support during dialysis sessions. Not in urgent need of HD today, CMP reviewed. last HD was partial on friday. IR was consulted for permacath placement earlier; reevaluate given Difficult vascular access.

## 2023-11-20 NOTE — PROGRESS NOTE ADULT - ASSESSMENT
72 y/o F well known to me from my [facility] outpt practice. she was admitted at [facility] 7/12-7/22 w aspiration PNA, was treated w CEFEPIME, developed an allergic rash,  dCHF, + MAC on AFB culture, had been progressively getting more and more lethargic and dyspneic at home since DC.   In  am of 8/11/23  ptn presented with respiratory distress w hypoxia and hypercarbia requiring intubation 2/2 volume overload +/- Asp PNA      Neuro   not responsive  Baseline MS AOx3, aphasic   - h/o CVA , on aspirin and statin . resumed w feeding tube, ASA resumed 8/26  - eeg  2/2 tremors, no sz focus, right facial twitching, EEG has been negative. 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 probably embolic in nature, did not tolerate full AC in the past, STEPHANIE is neg , no shunts observed  - ptn is poorly reactive, rpt scan done, no further CVA seen.     Cardiac   cardiology following  CHFpEF   TTE 7/2023 with EF 59%, with severe LVH and diastolic dysfunction   off Levo drip , now only during HD  on midodrine 40 mg qid, droxidopa 600 tid, additional 200 mg prior to HD,     Pulmonary   Acute hypercapnic and hypoxemic respiratory failure   prolonged intubation, now  trache to  vent, has copious secretions      Renal   on HD via L IJ Shiley, tunneled catheter when clinically stable  HD MWF, midodrine and pressor assisted,   permacath  scheduled to be placed in IR. cleared for PAC placement by ID.  presently no HD access  L IJ shiley is out      GI  NPO  on tube feeds  UGI bleed 8/31,  EGD/Colonoscopy: erosive gastropathy, esophagitis on colonoscopy old blood seen no active bleeding, poor prep  NGT-TF, Nepro  s/p 2UPRBC 11/4    Endocrine  on Insulin: NPH, Admelog    ID   complicated infectious hospital course  treated for MSSA bacteremia, aspiration PNA.  sputum + for pseudomonas, ptn was initially on zosyn but was allergic, then was switched to aztreonam   Aztreonam was switched to polymyxin for a possible allergy but ptn didnt have a reaction to aztreonam, she had a reaction to Zosyn.   spike temps again while off Abx, Aztreonam resumed 10/17, DCed 10/29  tmax 104.6 on 11/2-11/5, Cx NTD  leukocytosis is down today to 19 from 27K on 11/17. presumed this is 2/2 BAL via bronch on 11/16. afebrile  cont observing off ABx. ID follow up reviewed . 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 externa resolved    Heme/Onc  on eliquis for  LEFT POPLITEAL VEIN ACUTE DVT , on ELiquis    Ethics  GOC - Discussed GOC with daughter and , they have opted in the past for full code. and she remains full code at present

## 2023-11-20 NOTE — PROGRESS NOTE ADULT - ASSESSMENT
73 paola old with DM, prior CVA, kidney disease  Now with respiratory failure  Requiring HD- but difficulty tolerating    S/p bronch with removal of secretions  Vent setting stable    Leukocytosis    Known colonization with MDRO pseudomonas    Leukocytosis is likely multifactorial.  WBC has been increasing/ decreasing over the last few days    She will remain at high risk for reccurent infection including pneumonia, soft tissue injections and blood stream infections.   The risk of infection is higher with temporary lines than with a permcath.     Her overall prognosis is grave.  Her airways are chronically colonized.  She is at risk for recurrent pna with MDRO organisms and has limited treatment options.  At this time, her respiratory status is stable.     I would observe off antibiotics  If her status changes, repeat chest imaging  Ultimately, the decision re permcath placement is a risk benefit decision.     Prognosis grave.  Continue GOC discussions      Call ID service with questions

## 2023-11-20 NOTE — PROGRESS NOTE ADULT - ASSESSMENT
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       NEUROLOGY  #AMS  # Multiple embolic strokes   # L PCA Infarct   MR head performed w/ new infarct and old infarcts, EEG without seizure events but with high foci potential   - EEG given facial twitching: negative for epileptiform activity (10/31)  - deferring repeat MRI as unlikely to   - baseline  AOX3 at home per daughter, currently spontaneously opening eyes, localized to voice and pain, trached/non-verbal   - Keppra stopped 11/10 (was on as seizure ppx given multiple infarcts, no seizures on multiple EEG, stopped to ensure it is not contributing to sedation), the same mental status, -- opens eyes spontaneously, and shakes her head intermittently, not following commands    - aspirin held 10/11, likely in s/o GIB, Hgb stable not requiring PRBC since 11/4, ASA 81 mg restarted on 11/14  - continue Lipitor    CARDIOVASCULAR  # Septic vs vasoplegic shock   Etiology likely septic iso active infection. Possible component of vasoplegic, however no longer with active infection or on sedation. Off IV vasopressors.   Current regimen:   - droxidopa 600mg q8h with PRN 600mg ordered preHD  - midodrine 40mg q6 h; trial of additional 40mg prior to HD  - required levophed up to 0.14 during HD 11/15  weaned off after dialysis session, will need additional midodrine/droxidopa dose preHD    # HFpEF w/ decompensation   > ECHO w/ EF 59 with severe LVH and diastolic dysfunction   - fluid overloaded, HD to remove fluids     HEENT   # Left ear OE with acute on chronic left-sided otomastoiditis, s/p Abx (see in ID)   - noted to have white discharged/residue, increased from prior per patient's daughter; ENT reconsulted, resumed on cipro drops (10/31 - 11/7),   # 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   - resolved    RESPIRATORY  # AHRF second to volume overload   # Copious inline and oral thick tanned secretions   - CKD vs HFpEF w/ hypercarbia 2/2 volume overload requiring intubation, trach, and HD initiation  - Continue on albuterol and chest PT, cont 3 % INH, added IPV  - Continue volume removal with HD  - hypoxemia w/ turning and repositioning, CXR 11/16 shows moderate right pleural effusion, worsening RUL atelectasis, left has small effusion and/or atelectasis. Plan for flexible bronchoscopy and possible thoracentesis  - CXR 11/16 shows moderate right pleural effusion with suggestion of worsening RUL atelectasis, small left effusion and/or atelectasis.  - Bronchoscopy bedside 11/16 noted with white secretions right > left, BAL sent     #tracheomalacia   - CT CHEST (9/8) with tracheomalacia, BL pleural effusions and persistent consolidations     GI  # GIB: last pRBC transfused 11/4 (10/14 before that)   - Continue on PPI BID  - patient had melena before 11/13 but H/H stable,  Eliquis was held for dark brown/black stool  - as per nursing staff, stool is black 100 in the rectal system in 24 hrs     # Dysphagia   - NGT- tolerating feeds, at goal- changed to nepro was per nutrition    RENAL  # ESRD, L ARTHUR odom   - HD MWF TIW with midodrine and droxidopa   - ICU for vasopressor assisted volume removal, cap to 1 pressor and not offering CRRT due to the comorbidities and prognosis   - f/u w/ nephrology: will continue to offer patient 1-pressor assisted HD as agreed prior, as long as she can tolerate HD maxed on 1 pressor, Levo to 0.3; she will be considered iHD candidate  - IR was planning PermCath 11/14; but procedure was cancelled due to pt in MICU, on vasopressors, and has leucocytosis  - [facility] ilene discontinued 11/16 for line holiday, trialysis catheter placed in [facility] on 11/17 however was removed 11/18 iso poor flow rates during HD.  - IR re-consulted for permacath on 11/19- is requiring ID to clear patient given WBC  - as per ID recs- WBC downtrending      INFECTIOUS DISEASE  #Septic shock  , afebrile,  WBC - 15>27, LA wnls  #blood cx 9/1: MSSA with hypotension  repeat negative 9/4, 9/8 s/p 4 weeks of vanco/dapto through 10/2  #Colonized with  pseudomonas   # MSSA/ESBL E Coli PNA  # L otitis Externa s/p ENT debridement c/b Candida, s/p Meropenem and Fluconazole  # Proteus/ Pseudomonas in sputum, s/p Aztreonam   - recent Bcx 11/2 negative, sputum 11/2 with  pseudomonas- colonized   - repeat MRSA PCR neg  - afebrile now after completing course aztreonam 11/6  - monitoring off abx   - f/u BAL (11/16)- only afb was sent which is negative.     # possible malignant otitis externa   # ESBL ECOLI and MSSA VAP c/b MSSA bacteremia   - hx of MSSA bacteremia, ESBL E. Coli sputum Cx, BAL w/ MSSA  - treated with abx   - eosinophilia workup: JORGE, ANCAs negative    HEME  # Anemia second to GIB vs renal disease   - multiple transfusions, last done 11/4  - Eliquis held 2/2 pt having melena, and now pt with black stools/dark brown  - 11/14  restarted ASA 81 mg   - restarted Heparin SQ prophylaxis 11/14 today, will continue to hold off starting ac given melena.    #Allergic transfusion reaction: per RCU documentation, developed erythematous rash across chest shortly after starting transfusion, blood bank consulted and diagnosed mild allergic rxn  - premedicate w/ benadryl and famotidine prior to future transfusions  - no issue w/ pRBC transfusion 11/4    VASCULAR   # LLE POP DVT   - on Eliquis (held as of 11/11 PM for procedure and also patient having melena), stools are black/dark brown  - restarted Heparin SQ prophylaxis 11/14 and will continue to hold off starting Eliquis given melena.  - SCDs    # HD access  - [facility] (9/27 - 10/21)  - [facility] shiley replaced for HD (10/24 -11/15 ) L IJ shiley was removed today after HD and new line will be placed when pt need next HD session, will need Trialysis Shiley line, which is at the bedside and consent obtained from the family   - Permacath was planned on 11/14, but cancelled 2/2 pt in MICU, requiring pressors, and + leucocytosis   - L [facility] Trialysis catheter placed 11/17    ONC   # Hx of Breast CA   - Patient dx in 2018 and s/p B/L mastectomy (radical on right) and chemo and RT.     ENDOCRINE  # IDDM2   - Continue on NPH 6U with moderate ISS   - Adjust as needed      ETHICS/ GOC    - Attempted palliative discussion however family not interested and wishes for FULL CODE  - Family continues to want everything done despite inability to tolerate HD on multiple oral pressor  - Levo capped at 0.3 during HD     11/17- updated pts spouse over the phone and daughter at bedside.  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       NEUROLOGY  #AMS  # Multiple embolic strokes   # L PCA Infarct   MR head performed w/ new infarct and old infarcts, EEG without seizure events but with high foci potential   - EEG given facial twitching: negative for epileptiform activity (10/31)  - deferring repeat MRI as unlikely to   - baseline  AOX3 at home per daughter, currently spontaneously opening eyes, localized to voice and pain, trached/non-verbal   - Keppra stopped 11/10 (was on as seizure ppx given multiple infarcts, no seizures on multiple EEG, stopped to ensure it is not contributing to sedation), the same mental status, -- opens eyes spontaneously, and shakes her head intermittently, not following commands    - Hgb stable not requiring PRBC since 11/4, ASA 81 mg restarted on 11/14  - continue Lipitor    CARDIOVASCULAR  # Septic vs vasoplegic shock   Etiology likely septic iso active infection. Possible component of vasoplegic, however no longer with active infection or on sedation. Off IV vasopressors.   Current regimen:   - droxidopa 600mg q8h with PRN 600mg ordered preHD  - midodrine 40mg q6 h; trial of additional 40mg prior to HD  - required levophed up to 0.14 during HD 11/15  weaned off after dialysis session, will need additional midodrine/droxidopa dose preHD    # HFpEF w/ decompensation   > ECHO w/ EF 59 with severe LVH and diastolic dysfunction   - fluid overloaded, HD to remove fluids     HEENT   # Left ear OE with acute on chronic left-sided otomastoiditis, s/p Abx (see in ID)   - noted to have white discharged/residue, increased from prior per patient's daughter; ENT reconsulted, resumed on cipro drops (10/31 - 11/7),   # 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   - resolved    RESPIRATORY  # AHRF second to volume overload   # Copious inline and oral thick tanned secretions   - CKD vs HFpEF w/ hypercarbia 2/2 volume overload requiring intubation, trach, and HD initiation  - Continue on albuterol and chest PT, cont 3 % INH, IPV  - Continue volume removal with HD  - hypoxemia w/ turning and repositioning, CXR 11/16 shows moderate right pleural effusion, worsening RUL atelectasis, left has small effusion and/or atelectasis. Plan for flexible bronchoscopy and possible thoracentesis  - CXR 11/16 shows moderate right pleural effusion with suggestion of worsening RUL atelectasis, small left effusion and/or atelectasis.  - Bronchoscopy bedside 11/16 noted with white secretions right > left  - f/u BAL     #tracheomalacia   - CT CHEST (9/8) with tracheomalacia, BL pleural effusions and persistent consolidations     GI  # GIB: last pRBC transfused 11/4 (10/14 before that)   - Continue on PPI BID  - patient had melena before 11/13 but H/H stable,  Eliquis was held for dark brown/black stool  - as per nursing staff, stool is black in the rectal system    # Dysphagia   - NGT- tolerating feeds, at goal- changed to nepro was per nutrition    RENAL  # ESRD, L ARTHUR taylor   - HD MWF TIW with midodrine and droxidopa   - ICU for vasopressor assisted volume removal, cap to 1 pressor and not offering CRRT due to the comorbidities and prognosis   - f/u w/ nephrology: will continue to offer patient 1-pressor assisted HD as agreed prior, as long as she can tolerate HD maxed on 1 pressor, Levo to 0.3; she will be considered iHD candidate  - IR was planning PermCath 11/14; but procedure was cancelled due to pt in MICU, on vasopressors, and has leucocytosis  - [facility] ilene discontinued 11/16 for line holiday, trialysis catheter placed in [facility] on 11/17 however was removed 11/18 iso poor flow rates during HD.  - IR re-consulted for permacath on 11/19- is requiring ID to clear patient given WBC  - as per ID recs-       INFECTIOUS DISEASE  #Septic shock  , afebrile,  WBC - 15>27, LA wnls  #blood cx 9/1: MSSA with hypotension  repeat negative 9/4, 9/8 s/p 4 weeks of vanco/dapto through 10/2  #Colonized with  pseudomonas   # MSSA/ESBL E Coli PNA  # L otitis Externa s/p ENT debridement c/b Candida, s/p Meropenem and Fluconazole  # Proteus/ Pseudomonas in sputum, s/p Aztreonam   - recent Bcx 11/2 negative, sputum 11/2 with  pseudomonas- colonized   - repeat MRSA PCR neg  - afebrile now after completing course aztreonam 11/6  - monitoring off abx   - f/u BAL (11/16)- only afb was sent which is negative.     # possible malignant otitis externa   # ESBL ECOLI and MSSA VAP c/b MSSA bacteremia   - hx of MSSA bacteremia, ESBL E. Coli sputum Cx, BAL w/ MSSA  - treated with abx   - eosinophilia workup: JORGE, ANCAs negative    HEME  # Anemia second to GIB vs renal disease   - multiple transfusions, last done 11/4  - Eliquis held 2/2 pt having melena, and now pt with black stools/dark brown  - 11/14  restarted ASA 81 mg   - restarted Heparin SQ prophylaxis 11/14 today, will continue to hold off starting ac given melena.    #Allergic transfusion reaction: per RCU documentation, developed erythematous rash across chest shortly after starting transfusion, blood bank consulted and diagnosed mild allergic rxn  - premedicate w/ benadryl and famotidine prior to future transfusions  - no issue w/ pRBC transfusion 11/4    VASCULAR   # LLE POP DVT   - on Eliquis (held as of 11/11 PM for procedure and also patient having melena), stools are black/dark brown  - restarted Heparin SQ prophylaxis 11/14 and will continue to hold off starting Eliquis given melena.  - SCDs    # HD access  - [facility] (9/27 - 10/21), replaced 10/24 -11/15  - [facility] taylor discontinued 11/16 for line holiday, trialysis catheter placed in [facility] on 11/17 however was removed 11/18 iso poor flow rates during HD.  - Permacath was planned on 11/14, but cancelled 2/2 pt in MICU, requiring pressors, and + leukocytosis   - IR re-consulted for permacath on 11/19- is requiring ID to clear patient given WBC  - ID recs appreciated. Per ID, risk of infection with shiley is higher than with a permcath.  Decision re placement of permcath is a risk benefit decision. Pt may continue to have leukocytosis in setting of clinical condition. Lower suspicion for infection at this time.     ONC   # Hx of Breast CA   - Patient dx in 2018 and s/p B/L mastectomy (radical on right) and chemo and RT.     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       NEUROLOGY  #AMS  # Multiple embolic strokes   # L PCA Infarct   MR head performed w/ new infarct and old infarcts, EEG without seizure events but with high foci potential   - EEG given facial twitching: negative for epileptiform activity (10/31)  - deferring repeat MRI as unlikely to   - baseline  AOX3 at home per daughter, currently spontaneously opening eyes, localized to voice and pain, trached/non-verbal   - Keppra stopped 11/10 (was on as seizure ppx given multiple infarcts, no seizures on multiple EEG, stopped to ensure it is not contributing to sedation), the same mental status, -- opens eyes spontaneously, and shakes her head intermittently, not following commands    - Hgb stable not requiring PRBC since 11/4, ASA 81 mg restarted on 11/14  - continue Lipitor    CARDIOVASCULAR  # Septic vs vasoplegic shock   Etiology likely septic iso active infection. Possible component of vasoplegic, however no longer with active infection or on sedation. Off IV vasopressors.   Current regimen:   - droxidopa 600mg q8h with PRN 600mg ordered preHD  - midodrine 40mg q6 h; trial of additional 40mg prior to HD  - required levophed up to 0.14 during HD 11/15  weaned off after dialysis session, will need additional midodrine/droxidopa dose preHD    # HFpEF w/ decompensation   > ECHO w/ EF 59 with severe LVH and diastolic dysfunction   - fluid overloaded, HD to remove fluids     HEENT   # Left ear OE with acute on chronic left-sided otomastoiditis, s/p Abx (see in ID)   - noted to have white discharged/residue, increased from prior per patient's daughter; ENT reconsulted, resumed on cipro drops (10/31 - 11/7),   # 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   - resolved    RESPIRATORY  # AHRF second to volume overload   # Copious inline and oral thick tanned secretions   - CKD vs HFpEF w/ hypercarbia 2/2 volume overload requiring intubation, trach, and HD initiation  - Continue on albuterol and chest PT, cont 3 % INH, IPV  - Continue volume removal with HD  - hypoxemia w/ turning and repositioning, CXR 11/16 shows moderate right pleural effusion, worsening RUL atelectasis, left has small effusion and/or atelectasis. Plan for flexible bronchoscopy and possible thoracentesis  - CXR 11/16 shows moderate right pleural effusion with suggestion of worsening RUL atelectasis, small left effusion and/or atelectasis.  - Bronchoscopy bedside 11/16 noted with white secretions right > left  - f/u BAL     #tracheomalacia   - CT CHEST (9/8) with tracheomalacia, BL pleural effusions and persistent consolidations     GI  # GIB: last pRBC transfused 11/4 (10/14 before that)   - Continue on PPI BID  - patient had melena before 11/13 but H/H stable,  Eliquis was held for dark brown/black stool  - as per nursing staff, stool is black in the rectal system    # Dysphagia   - NGT- tolerating feeds, at goal- changed to nepro was per nutrition    RENAL  # ESRD, L ARTHUR taylor   - HD MWF TIW with midodrine and droxidopa   - ICU for vasopressor assisted volume removal, cap to 1 pressor and not offering CRRT due to the comorbidities and prognosis   - f/u w/ nephrology: will continue to offer patient 1-pressor assisted HD as agreed prior, as long as she can tolerate HD maxed on 1 pressor, Levo to 0.3; she will be considered iHD candidate  - IR was planning PermCath 11/14; but procedure was cancelled due to pt in MICU, on vasopressors, and has leucocytosis  - [facility] ilene discontinued 11/16 for line holiday, trialysis catheter placed in [facility] on 11/17 however was removed 11/18 iso poor flow rates during HD.  - IR re-consulted for permacath on 11/19- is requiring ID to clear patient given WBC  - ID recs appreciated. Per ID, risk of infection with shiley is higher than with a permcath.  Decision re placement of permcath is a risk benefit decision. Pt may continue to have leukocytosis in setting of clinical condition. Lower suspicion for infection at this time.   - IR reconsulted regarding permacath     INFECTIOUS DISEASE  #Septic shock  , afebrile,  WBC - 15>27, LA wnls  #blood cx 9/1: MSSA with hypotension  repeat negative 9/4, 9/8 s/p 4 weeks of vanco/dapto through 10/2  #Colonized with  pseudomonas   # MSSA/ESBL E Coli PNA  # L otitis Externa s/p ENT debridement c/b Candida, s/p Meropenem and Fluconazole  # Proteus/ Pseudomonas in sputum, s/p Aztreonam   - recent Bcx 11/2 negative, sputum 11/2 with  pseudomonas- colonized   - repeat MRSA PCR neg  - afebrile now after completing course aztreonam 11/6  - monitoring off abx   - f/u BAL (11/16)- only afb was sent which is negative.     # possible malignant otitis externa   # ESBL ECOLI and MSSA VAP c/b MSSA bacteremia   - hx of MSSA bacteremia, ESBL E. Coli sputum Cx, BAL w/ MSSA  - treated with abx   - eosinophilia workup: JORGE, ANCAs negative    HEME  # Anemia second to GIB vs renal disease   - multiple transfusions, last done 11/4  - Eliquis held 2/2 pt having melena, and now pt with black stools/dark brown  - 11/14  restarted ASA 81 mg   - restarted Heparin SQ prophylaxis 11/14 today, will continue to hold off starting ac given melena.    #Allergic transfusion reaction: per RCU documentation, developed erythematous rash across chest shortly after starting transfusion, blood bank consulted and diagnosed mild allergic rxn  - premedicate w/ benadryl and famotidine prior to future transfusions  - no issue w/ pRBC transfusion 11/4    VASCULAR   # LLE POP DVT   - on Eliquis (held as of 11/11 PM for procedure and also patient having melena), stools are black/dark brown  - restarted Heparin SQ prophylaxis 11/14 and will continue to hold off starting Eliquis given melena.  - SCDs    # HD access  - [facility] (9/27 - 10/21), replaced 10/24 -11/15  - TR[facility] taylor discontinued 11/16 for line holiday, trialysis catheter placed in [facility] on 11/17 however was removed 11/18 iso poor flow rates during HD.  - Permacath was planned on 11/14, but cancelled 2/2 pt in MICU, requiring pressors, and + leukocytosis   - IR re-consulted for permacath on 11/19- is requiring ID to clear patient given WBC    ONC   # Hx of Breast CA   - Patient dx in 2018 and s/p B/L mastectomy (radical on right) and chemo and RT.     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       NEUROLOGY  #AMS  # Multiple embolic strokes   # L PCA Infarct   MR head performed w/ new infarct and old infarcts, EEG without seizure events but with high foci potential   - EEG given facial twitching: negative for epileptiform activity (10/31)  - deferring repeat MRI as unlikely to   - baseline  AOX3 at home per daughter, currently spontaneously opening eyes, localized to voice and pain, trached/non-verbal   - Keppra stopped 11/10 (was on as seizure ppx given multiple infarcts, no seizures on multiple EEG, stopped to ensure it is not contributing to sedation), the same mental status, -- opens eyes spontaneously, and shakes her head intermittently, not following commands    - Hgb stable not requiring PRBC since 11/4, ASA 81 mg restarted on 11/14  - continue Lipitor    CARDIOVASCULAR  # Septic vs vasoplegic shock   Etiology likely septic iso active infection. Possible component of vasoplegic, however no longer with active infection or on sedation. Off IV vasopressors.   Current regimen:   - droxidopa 600mg q8h with PRN 600mg ordered preHD  - midodrine 40mg q6 h; trial of additional 40mg prior to HD  - required levophed up to 0.14 during HD 11/15  weaned off after dialysis session, will need additional midodrine/droxidopa dose preHD    # HFpEF w/ decompensation   > ECHO w/ EF 59 with severe LVH and diastolic dysfunction   - fluid overloaded, HD to remove fluids     HEENT   # Left ear OE with acute on chronic left-sided otomastoiditis, s/p Abx (see in ID)   - noted to have white discharged/residue, increased from prior per patient's daughter; ENT reconsulted, resumed on cipro drops (10/31 - 11/7),   # 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   - resolved    RESPIRATORY  # acute hypoxic resp failure second to volume overload, ventilator dependence   # Copious inline and oral thick tanned secretions   - CKD vs HFpEF w/ hypercarbia 2/2 volume overload requiring intubation, trach, and HD initiation  - Continue on albuterol and chest PT, cont 3 % INH, IPV  - Continue volume removal with HD  - hypoxemia w/ turning and repositioning, CXR 11/16 shows moderate right pleural effusion, worsening RUL atelectasis, left has small effusion and/or atelectasis. Plan for flexible bronchoscopy and possible thoracentesis  - CXR 11/16 shows moderate right pleural effusion with suggestion of worsening RUL atelectasis, small left effusion and/or atelectasis.  - Bronchoscopy bedside 11/16 noted with white secretions right > left  - f/u BAL     #tracheomalacia   - CT CHEST (9/8) with tracheomalacia, BL pleural effusions and persistent consolidations     GI  # GIB: last pRBC transfused 11/4 (10/14 before that)   - Continue on PPI BID  - patient had melena before 11/13 but H/H stable,  Eliquis was held for dark brown/black stool  - as per nursing staff, stool is black in the rectal system    # Dysphagia   - NGT- tolerating feeds, at goal- changed to nepro was per nutrition    RENAL  # ESRD, L ARTHUR taylor   - HD MWF TIW with midodrine and droxidopa   - ICU for vasopressor assisted volume removal, cap to 1 pressor and not offering CRRT due to the comorbidities and prognosis   - f/u w/ nephrology: will continue to offer patient 1-pressor assisted HD as agreed prior, as long as she can tolerate HD maxed on 1 pressor, Levo to 0.3; she will be considered iHD candidate  - IR was planning PermCath 11/14; but procedure was cancelled due to pt in MICU, on vasopressors, and has leucocytosis  - [facility] ilene discontinued 11/16 for line holiday, trialysis catheter placed in [facility] on 11/17 however was removed 11/18 iso poor flow rates during HD.  - IR re-consulted for permacath on 11/19- is requiring ID to clear patient given WBC  - ID recs appreciated. Per ID, risk of infection with shiley is higher than with a permcath.  Decision re placement of permcath is a risk benefit decision. Pt may continue to have leukocytosis in setting of clinical condition. Lower suspicion for infection at this time.   - IR reconsulted regarding permacath     INFECTIOUS DISEASE  #Septic shock  , afebrile,  WBC - 15>27, LA wnls  #blood cx 9/1: MSSA with hypotension  repeat negative 9/4, 9/8 s/p 4 weeks of vanco/dapto through 10/2  #Colonized with  pseudomonas   # MSSA/ESBL E Coli PNA  # L otitis Externa s/p ENT debridement c/b Candida, s/p Meropenem and Fluconazole  # Proteus/ Pseudomonas in sputum, s/p Aztreonam   - recent Bcx 11/2 negative, sputum 11/2 with  pseudomonas- colonized   - repeat MRSA PCR neg  - afebrile now after completing course aztreonam 11/6  - monitoring off abx   - f/u BAL (11/16)- only afb was sent which is negative.     # possible malignant otitis externa   # ESBL ECOLI and MSSA VAP c/b MSSA bacteremia   - hx of MSSA bacteremia, ESBL E. Coli sputum Cx, BAL w/ MSSA  - treated with abx   - eosinophilia workup: JORGE, ANCAs negative    HEME  # Anemia second to GIB vs renal disease   - multiple transfusions, last done 11/4  - Eliquis held 2/2 pt having melena, and now pt with black stools/dark brown  - 11/14  restarted ASA 81 mg   - restarted Heparin SQ prophylaxis 11/14 today, will continue to hold off starting ac given melena.    #Allergic transfusion reaction: per RCU documentation, developed erythematous rash across chest shortly after starting transfusion, blood bank consulted and diagnosed mild allergic rxn  - premedicate w/ benadryl and famotidine prior to future transfusions  - no issue w/ pRBC transfusion 11/4    VASCULAR   # LLE POP DVT   - on Eliquis (held as of 11/11 PM for procedure and also patient having melena), stools are black/dark brown  - restarted Heparin SQ prophylaxis 11/14 and will continue to hold off starting Eliquis given melena.  - SCDs    # HD access  - TRISHA TAYLOR (9/27 - 10/21), replaced 10/24 -11/15  - [facility] lilifabi discontinued 11/16 for line holiday, trialysis catheter placed in [facility] on 11/17 however was removed 11/18 iso poor flow rates during HD.  - Permacath was planned on 11/14, but cancelled 2/2 pt in MICU, requiring pressors, and + leukocytosis   - IR re-consulted for permacath on 11/19- is requiring ID to clear patient given WBC    ONC   # Hx of Breast CA   - Patient dx in 2018 and s/p B/L mastectomy (radical on right) and chemo and RT.     ENDOCRINE  # IDDM2   - Continue on NPH 6U with moderate ISS   - Adjust as needed      ETHICS/ GOC    - Attempted palliative discussion however family not interested and wishes for FULL CODE  - Family continues to want everything done despite inability to tolerate HD on multiple oral pressor  - Levo capped at 0.3 during HD

## 2023-11-20 NOTE — PROGRESS NOTE ADULT - SUBJECTIVE AND OBJECTIVE BOX
Follow Up:      Inverval History/ROS:Patient is a 73y old  Female who presents with a chief complaint of Respiratory distress (20 Nov 2023 13:04)    Trach   On vent  No fever  Fi O2 @ 40 %    Allergies    isoniazid (Rash)  nafcillin (Unknown)  hydrALAZINE (Rash)  vitamin E (Short breath; Urticaria; Hives)  doxycycline (Rash)  cefepime (Rash)  NIFEdipine (Urticaria; Hives)  Zosyn (Rash)    Intolerances        ANTIMICROBIALS:      OTHER MEDS:  acetaminophen   Oral Liquid .. 650 milliGRAM(s) Oral every 6 hours PRN  albuterol/ipratropium for Nebulization 3 milliLiter(s) Nebulizer every 6 hours  artificial tears (preservative free) Ophthalmic Solution 1 Drop(s) Both EYES every 4 hours  aspirin  chewable 81 milliGRAM(s) Enteral Tube daily  atorvastatin 10 milliGRAM(s) Oral at bedtime  calamine/zinc oxide Lotion 1 Application(s) Topical daily  chlorhexidine 0.12% Liquid 15 milliLiter(s) Oral Mucosa every 12 hours  chlorhexidine 2% Cloths 1 Application(s) Topical daily  chlorhexidine 4% Liquid 1 Application(s) Topical <User Schedule>  dextrose 5%. 1000 milliLiter(s) IV Continuous <Continuous>  dextrose 5%. 1000 milliLiter(s) IV Continuous <Continuous>  dextrose 50% Injectable 12.5 Gram(s) IV Push once  dextrose 50% Injectable 25 Gram(s) IV Push once  dextrose 50% Injectable 25 Gram(s) IV Push once  dextrose 50% Injectable 25 Gram(s) IV Push once  dextrose Oral Gel 15 Gram(s) Oral once PRN  diphenhydrAMINE Elixir 50 milliGRAM(s) Oral once PRN  droxidopa 600 milliGRAM(s) Oral <User Schedule> PRN  droxidopa 600 milliGRAM(s) Oral every 8 hours  epoetin odalis (EPOGEN) Injectable 19574 Unit(s) IV Push <User Schedule>  ferrous    sulfate Liquid 300 milliGRAM(s) Oral daily  glucagon  Injectable 1 milliGRAM(s) IntraMuscular once  heparin   Injectable 5000 Unit(s) SubCutaneous every 8 hours  insulin lispro (ADMELOG) corrective regimen sliding scale   SubCutaneous every 6 hours  insulin NPH human recombinant 6 Unit(s) SubCutaneous every 6 hours  midodrine. 40 milliGRAM(s) Oral once PRN  midodrine. 40 milliGRAM(s) Oral <User Schedule>  norepinephrine Infusion 0.05 MICROgram(s)/kG/Min IV Continuous <Continuous>  pantoprazole  Injectable 40 milliGRAM(s) IV Push two times a day  petrolatum Ophthalmic Ointment 1 Application(s) Both EYES four times a day  sodium chloride 1 Gram(s) Oral two times a day  sodium chloride 0.9% Bolus. 250 milliLiter(s) IV Bolus every 5 minutes PRN  sodium chloride 0.9% lock flush 10 milliLiter(s) IV Push every 1 hour PRN  sodium chloride 3%  Inhalation 4 milliLiter(s) Inhalation every 6 hours      Vital Signs Last 24 Hrs  T(C): 36.8 (20 Nov 2023 16:00), Max: 37.3 (20 Nov 2023 12:00)  T(F): 98.2 (20 Nov 2023 16:00), Max: 99.2 (20 Nov 2023 12:00)  HR: 102 (20 Nov 2023 16:11) (93 - 114)  BP: --  BP(mean): --  RR: 24 (20 Nov 2023 16:00) (12 - 28)  SpO2: 100% (20 Nov 2023 16:11) (92% - 100%)    Parameters below as of 20 Nov 2023 16:11  Patient On (Oxygen Delivery Method): ventilator        PHYSICAL EXAM:  General: [x ] trach  HEAD/EYES: [ ] PERRL [x ] white sclera [ ] icterus  ENT:  [ ] normal [ ] supple [ ] thrush [ ] pharyngeal exudate  Cardiovascular:   [ ] murmur [ x] normal [ ] PPM/AICD  Respiratory:  [ x] course BS  GI:  [ x] soft, non-tender, normal bowel sounds  :  [ ] willard [ ] no CVA tenderness   Musculoskeletal:  [ ] no synovitis  Neurologic:  [ ] non-focal exam   Skin:  [x ] no rash  Lymph: [x ] no lymphadenopathy  Psychiatric:  [ ] appropriate affect [ ] alert & oriented  Lines:  [x ] no phlebitis [ ] central line                                8.8    19.27 )-----------( 422      ( 20 Nov 2023 01:50 )             30.1       11-20    134<L>  |  100  |  37<H>  ----------------------------<  135<H>  4.2   |  27  |  2.06<H>    Ca    9.1      20 Nov 2023 01:50  Phos  4.4     11-20  Mg     2.40     11-20    TPro  6.1  /  Alb  2.0<L>  /  TBili  <0.2  /  DBili  x   /  AST  18  /  ALT  14  /  AlkPhos  341<H>  11-20      Urinalysis Basic - ( 20 Nov 2023 01:50 )    Color: x / Appearance: x / SG: x / pH: x  Gluc: 135 mg/dL / Ketone: x  / Bili: x / Urobili: x   Blood: x / Protein: x / Nitrite: x   Leuk Esterase: x / RBC: x / WBC x   Sq Epi: x / Non Sq Epi: x / Bacteria: x        MICROBIOLOGY:Culture Results:   Culture is being performed. (11-16-23 @ 23:59)      RADIOLOGY:

## 2023-11-20 NOTE — CONSULT NOTE ADULT - REASON FOR ADMISSION
Respiratory distress

## 2023-11-20 NOTE — CHART NOTE - NSCHARTNOTEFT_GEN_A_CORE
PRE-INTERVENTIONAL RADIOLOGY PROCEDURE NOTE      Patient Age: 73y    Patient Gender: Female    Procedure:     Diagnosis/Indication:    Interventional Radiology Attending Physician:    Ordering Attending Physician:    Pertinent Medical History:    Pertinent labs:                      8.8    19.27 )-----------( 422      ( 20 Nov 2023 01:50 )             30.1       11-20    134<L>  |  100  |  37<H>  ----------------------------<  135<H>  4.2   |  27  |  2.06<H>    Ca    9.1      20 Nov 2023 01:50  Phos  4.4     11-20  Mg     2.40     11-20    TPro  6.1  /  Alb  2.0<L>  /  TBili  <0.2  /  DBili  x   /  AST  18  /  ALT  14  /  AlkPhos  341<H>  11-20              Patient and Family Aware ? Yes PRE-INTERVENTIONAL RADIOLOGY PROCEDURE NOTE      Patient Age: 73y    Patient Gender: Female    Procedure: permacath placement    Diagnosis/Indication: needing access for HD    Interventional Radiology Attending Physician: Dr. Love    Ordering Attending Physician: Dr. 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     Pertinent labs:                      8.8    19.27 )-----------( 422      ( 20 Nov 2023 01:50 )             30.1       11-20    134<L>  |  100  |  37<H>  ----------------------------<  135<H>  4.2   |  27  |  2.06<H>    Ca    9.1      20 Nov 2023 01:50  Phos  4.4     11-20  Mg     2.40     11-20    TPro  6.1  /  Alb  2.0<L>  /  TBili  <0.2  /  DBili  x   /  AST  18  /  ALT  14  /  AlkPhos  341<H>  11-20              Patient and Family Aware ? Yes

## 2023-11-20 NOTE — CONSULT NOTE ADULT - ASSESSMENT
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 Patient has shiley for HD. IR consulted for tunneled dialysis catheter placement.     Allergies: isoniazid (Rash)  nafcillin (Unknown)  hydrALAZINE (Rash)  vitamin E (Short breath; Urticaria; Hives)  doxycycline (Rash)  cefepime (Rash)  NIFEdipine (Urticaria; Hives)  Zosyn (Rash)    Medications (Abx/Cardiac/Anticoagulation/Blood Products)    aspirin  chewable: 81 milliGRAM(s) Enteral Tube (11-20 @ 11:07)  droxidopa: 600 milliGRAM(s) Oral (11-20 @ 11:08)  heparin   Injectable: 5000 Unit(s) SubCutaneous (11-20 @ 13:19)  midodrine.: 40 milliGRAM(s) Oral (11-20 @ 11:08)    Data:    T(C): 36.8  HR: 102  BP: --  RR: 24  SpO2: 100%    -WBC 19.27 / HgB 8.8 / Hct 30.1 / Plt 422  -Na 134 / Cl 100 / BUN 37 / Glucose 135  -K 4.2 / CO2 27 / Cr 2.06  -ALT 14 / Alk Phos 341 / T.Bili <0.2  -INR 1.06 / PTT 31.6      Radiology:     Assessment/Plan:     -- IR will plan to perform tunneled dialysis catheter placement 11/21  -- NPO at midnight on 11/20  -- hold a.m. anticoagulation on 11/21  -- please complete IR pre-procedure note  -- please place IR procedure request order under Dr. Love    --  Chance Briscoe, PGY-3  Vascular and Interventional Radiology   Available on [employer] Teams    - Non-emergent consults: Place IR consult order in [place]  - Emergent issues (pager): [facility] [phone]; [facility] [phone]; [phone]  - Scheduling questions: [facility] [phone]; [facility] [phone]  - Clinic/outpatient booking: [facility] [phone]; [facility] [phone]

## 2023-11-20 NOTE — PROGRESS NOTE ADULT - SUBJECTIVE AND OBJECTIVE BOX
Subjective: Patient seen and examined. No new events except as noted.   remains in ICU     REVIEW OF SYSTEMS:  Unable to obtain         MEDICATIONS:  MEDICATIONS  (STANDING):  albuterol/ipratropium for Nebulization 3 milliLiter(s) Nebulizer every 6 hours  artificial tears (preservative free) Ophthalmic Solution 1 Drop(s) Both EYES every 4 hours  aspirin  chewable 81 milliGRAM(s) Enteral Tube daily  atorvastatin 10 milliGRAM(s) Oral at bedtime  calamine/zinc oxide Lotion 1 Application(s) Topical daily  chlorhexidine 0.12% Liquid 15 milliLiter(s) Oral Mucosa every 12 hours  chlorhexidine 2% Cloths 1 Application(s) Topical daily  chlorhexidine 4% Liquid 1 Application(s) Topical <User Schedule>  dextrose 5%. 1000 milliLiter(s) (100 mL/Hr) IV Continuous <Continuous>  dextrose 5%. 1000 milliLiter(s) (50 mL/Hr) IV Continuous <Continuous>  dextrose 50% Injectable 12.5 Gram(s) IV Push once  dextrose 50% Injectable 25 Gram(s) IV Push once  dextrose 50% Injectable 25 Gram(s) IV Push once  dextrose 50% Injectable 25 Gram(s) IV Push once  droxidopa 600 milliGRAM(s) Oral every 8 hours  epoetin odalis (EPOGEN) Injectable 79051 Unit(s) IV Push <User Schedule>  ferrous    sulfate Liquid 300 milliGRAM(s) Oral daily  glucagon  Injectable 1 milliGRAM(s) IntraMuscular once  heparin   Injectable 5000 Unit(s) SubCutaneous every 8 hours  insulin lispro (ADMELOG) corrective regimen sliding scale   SubCutaneous every 6 hours  insulin NPH human recombinant 6 Unit(s) SubCutaneous every 6 hours  midodrine. 40 milliGRAM(s) Oral <User Schedule>  norepinephrine Infusion 0.05 MICROgram(s)/kG/Min (6.23 mL/Hr) IV Continuous <Continuous>  pantoprazole  Injectable 40 milliGRAM(s) IV Push two times a day  petrolatum Ophthalmic Ointment 1 Application(s) Both EYES four times a day  sodium chloride 1 Gram(s) Oral two times a day  sodium chloride 3%  Inhalation 4 milliLiter(s) Inhalation every 6 hours      PHYSICAL EXAM:  T(C): 37 (11-20-23 @ 04:00), Max: 37.2 (11-19-23 @ 20:00)  HR: 102 (11-20-23 @ 09:23) (93 - 114)  BP: --  RR: 19 (11-20-23 @ 09:00) (12 - 28)  SpO2: 100% (11-20-23 @ 09:23) (92% - 100%)  Wt(kg): --  I&O's Summary    19 Nov 2023 07:01  -  20 Nov 2023 07:00  --------------------------------------------------------  IN: 900 mL / OUT: 400 mL / NET: 500 mL    20 Nov 2023 07:01  -  20 Nov 2023 09:50  --------------------------------------------------------  IN: 90 mL / OUT: 0 mL / NET: 90 mL            Appearance: NAD, +trach  +NGT  HEENT: dry oral mucosa  Lymphatic: No lymphadenopathy  Cardiovascular: Normal S1 S2, No JVD, No murmurs, No edema  Respiratory: Decreased BS, + trach to vent	  Neuro: opens eyes  Gastrointestinal: Soft, Non-tender, + BS, + NGT  Skin: No rashes, No ecchymoses, No cyanosis	  Extremities: No strength/ROM 2/2 sedation, + BL LE edema  Vascular: Peripheral pulses palpable 2+ bilaterally  Anasarca   Mode: AC/ CMV (Assist Control/ Continuous Mandatory Ventilation), RR (machine): 24, FiO2: 40, PEEP: 8, ITime: 0.8, MAP: 19, PC: 35, PIP: 38  LABS:    CARDIAC MARKERS:    Blood Gas Profile - Arterial (11.17.23 @ 01:04)   pH, Arterial: 7.34  pCO2, Arterial: 50 mmHg  pO2, Arterial: 86 mmHg  HCO3, Arterial: 27 mmol/L  Base Excess, Arterial: 0.8 mmol/L  Oxygen Saturation, Arterial: 98.0 %  Total CO2, Arterial: 28 mmol/L                            8.8    19.27 )-----------( 422      ( 20 Nov 2023 01:50 )             30.1     11-20    134<L>  |  100  |  37<H>  ----------------------------<  135<H>  4.2   |  27  |  2.06<H>    Ca    9.1      20 Nov 2023 01:50  Phos  4.4     11-20  Mg     2.40     11-20    TPro  6.1  /  Alb  2.0<L>  /  TBili  <0.2  /  DBili  x   /  AST  18  /  ALT  14  /  AlkPhos  341<H>  11-20      TELEMETRY: 	  SR   ECG:  	  RADIOLOGY:   DIAGNOSTIC TESTING:  [ ] Echocardiogram:  [ ]  Catheterization:  [ ] Stress Test:    OTHER:

## 2023-11-20 NOTE — PROGRESS NOTE ADULT - SUBJECTIVE AND OBJECTIVE BOX
NEPHROLOGY     Patient seen and examined with daughter at bedside, trach to vent, no acute distress     MEDICATIONS  (STANDING):  albuterol/ipratropium for Nebulization 3 milliLiter(s) Nebulizer every 6 hours  artificial tears (preservative free) Ophthalmic Solution 1 Drop(s) Both EYES every 4 hours  aspirin  chewable 81 milliGRAM(s) Enteral Tube daily  atorvastatin 10 milliGRAM(s) Oral at bedtime  calamine/zinc oxide Lotion 1 Application(s) Topical daily  chlorhexidine 0.12% Liquid 15 milliLiter(s) Oral Mucosa every 12 hours  chlorhexidine 2% Cloths 1 Application(s) Topical daily  chlorhexidine 4% Liquid 1 Application(s) Topical <User Schedule>  dextrose 5%. 1000 milliLiter(s) (50 mL/Hr) IV Continuous <Continuous>  dextrose 5%. 1000 milliLiter(s) (100 mL/Hr) IV Continuous <Continuous>  dextrose 50% Injectable 12.5 Gram(s) IV Push once  dextrose 50% Injectable 25 Gram(s) IV Push once  dextrose 50% Injectable 25 Gram(s) IV Push once  dextrose 50% Injectable 25 Gram(s) IV Push once  droxidopa 600 milliGRAM(s) Oral every 8 hours  epoetin odalis (EPOGEN) Injectable 19932 Unit(s) IV Push <User Schedule>  ferrous    sulfate Liquid 300 milliGRAM(s) Oral daily  glucagon  Injectable 1 milliGRAM(s) IntraMuscular once  heparin   Injectable 5000 Unit(s) SubCutaneous every 8 hours  insulin lispro (ADMELOG) corrective regimen sliding scale   SubCutaneous every 6 hours  insulin NPH human recombinant 6 Unit(s) SubCutaneous every 6 hours  midodrine. 40 milliGRAM(s) Oral <User Schedule>  norepinephrine Infusion 0.05 MICROgram(s)/kG/Min (6.23 mL/Hr) IV Continuous <Continuous>  pantoprazole  Injectable 40 milliGRAM(s) IV Push two times a day  petrolatum Ophthalmic Ointment 1 Application(s) Both EYES four times a day  sodium chloride 1 Gram(s) Oral two times a day  sodium chloride 3%  Inhalation 4 milliLiter(s) Inhalation every 6 hours    VITALS:  T(C): , Max: 37.2 (11-19-23 @ 20:00)  T(F): , Max: 99 (11-19-23 @ 20:00)  HR: 106 (11-20-23 @ 11:07)  BP: --  RR: 19 (11-20-23 @ 10:00)  SpO2: 96% (11-20-23 @ 11:07)    PHYSICAL EXAM:  Constitutional: critically ill, trach to vent   HEENT: + NGT, + tracheostomy   Respiratory: coarse BS  Cardiovascular: tachy s1s2  Gastrointestinal: BS+, soft, NT/ND  Extremities: ++ generalized edema  : No Wheeler  Skin: No rashes  Access: none    LABS:                        8.8    19.27 )-----------( 422      ( 20 Nov 2023 01:50 )             30.1     11-20    134<L>  |  100  |  37<H>  ----------------------------<  135<H>  4.2   |  27  |  2.06<H>    Ca    9.1      20 Nov 2023 01:50  Phos  4.4     11-20  Mg     2.40     11-20    TPro  6.1  /  Alb  2.0<L>  /  TBili  <0.2  /  DBili  x   /  AST  18  /  ALT  14  /  AlkPhos  341<H>  11-20   NEPHROLOGY     Patient seen and examined with daughter at bedside, trach to vent, no acute distress. Last dialyzed Friday, removed 0.6L, noted shiley with poor flow, shiley was subsequently removed, at present no HD access.    MEDICATIONS  (STANDING):  albuterol/ipratropium for Nebulization 3 milliLiter(s) Nebulizer every 6 hours  artificial tears (preservative free) Ophthalmic Solution 1 Drop(s) Both EYES every 4 hours  aspirin  chewable 81 milliGRAM(s) Enteral Tube daily  atorvastatin 10 milliGRAM(s) Oral at bedtime  calamine/zinc oxide Lotion 1 Application(s) Topical daily  chlorhexidine 0.12% Liquid 15 milliLiter(s) Oral Mucosa every 12 hours  chlorhexidine 2% Cloths 1 Application(s) Topical daily  chlorhexidine 4% Liquid 1 Application(s) Topical <User Schedule>  dextrose 5%. 1000 milliLiter(s) (50 mL/Hr) IV Continuous <Continuous>  dextrose 5%. 1000 milliLiter(s) (100 mL/Hr) IV Continuous <Continuous>  dextrose 50% Injectable 12.5 Gram(s) IV Push once  dextrose 50% Injectable 25 Gram(s) IV Push once  dextrose 50% Injectable 25 Gram(s) IV Push once  dextrose 50% Injectable 25 Gram(s) IV Push once  droxidopa 600 milliGRAM(s) Oral every 8 hours  epoetin odalis (EPOGEN) Injectable 28774 Unit(s) IV Push <User Schedule>  ferrous    sulfate Liquid 300 milliGRAM(s) Oral daily  glucagon  Injectable 1 milliGRAM(s) IntraMuscular once  heparin   Injectable 5000 Unit(s) SubCutaneous every 8 hours  insulin lispro (ADMELOG) corrective regimen sliding scale   SubCutaneous every 6 hours  insulin NPH human recombinant 6 Unit(s) SubCutaneous every 6 hours  midodrine. 40 milliGRAM(s) Oral <User Schedule>  norepinephrine Infusion 0.05 MICROgram(s)/kG/Min (6.23 mL/Hr) IV Continuous <Continuous>  pantoprazole  Injectable 40 milliGRAM(s) IV Push two times a day  petrolatum Ophthalmic Ointment 1 Application(s) Both EYES four times a day  sodium chloride 1 Gram(s) Oral two times a day  sodium chloride 3%  Inhalation 4 milliLiter(s) Inhalation every 6 hours    VITALS:  T(C): , Max: 37.2 (11-19-23 @ 20:00)  T(F): , Max: 99 (11-19-23 @ 20:00)  HR: 106 (11-20-23 @ 11:07)  BP: --  RR: 19 (11-20-23 @ 10:00)  SpO2: 96% (11-20-23 @ 11:07)    PHYSICAL EXAM:  Constitutional: critically ill, trach to vent   HEENT: + NGT, + tracheostomy   Respiratory: coarse BS  Cardiovascular: tachy s1s2  Gastrointestinal: BS+, soft, NT/ND  Extremities: ++ generalized edema  : No Wheeler  Skin: No rashes  Access: none    LABS:                        8.8    19.27 )-----------( 422      ( 20 Nov 2023 01:50 )             30.1     11-20    134<L>  |  100  |  37<H>  ----------------------------<  135<H>  4.2   |  27  |  2.06<H>    Ca    9.1      20 Nov 2023 01:50  Phos  4.4     11-20  Mg     2.40     11-20    TPro  6.1  /  Alb  2.0<L>  /  TBili  <0.2  /  DBili  x   /  AST  18  /  ALT  14  /  AlkPhos  341<H>  11-20

## 2023-11-20 NOTE — CONSULT NOTE ADULT - PROVIDER SPECIALTY LIST ADULT
Neurology
Nephrology
Gastroenterology
Internal Medicine
MICU
ENT
Intervent Pulmonology
Pathology
Infectious Disease
Intervent Radiology
Intervent Radiology
Ophthalmology
Pathology
Pulmonology
Intervent Radiology
Intervent Radiology
Cardiology
Palliative Care

## 2023-11-20 NOTE — PROGRESS NOTE ADULT - ASSESSMENT
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 Last dialyzed Friday     (2)CV- tenuous hemodynamics - intermittently requiring pressor gtt/on q6h Midodrine     (3)Pulm- trach/vent-dependent    (4)Anemia- on Epogen with HD    (5)GOC - s/p repeated discussions with family regarding GOC - family persisting with interest in aggressive measures.     RECOMMEND:           IMPRESSION: 73F w/ HTN, DM2, CVA, breast CA-bilateral mastectomy, recurrent aspiration pneumonia/respiratory failure, and CKD, 8/11/23 p/w acute hypercapnic respiratory failure; c/b RUSS    (1)Renal - RUSS on CKD4 ==>newly ESRD-HD as of this admission. Last dialyzed Friday     (2)CV- tenuous hemodynamics - intermittently requiring pressor gtt/on q6h Midodrine     (3)Pulm- trach/vent-dependent    (4)Anemia- on Epogen with HD    (5)GOC - s/p repeated discussions with family regarding GOC - family persisting with interest in aggressive measures.     RECOMMEND:  (1)Will plan for next HD once access obtained    Nilda Espinoza DNP  [facility]  [phone]              IMPRESSION: 73F w/ HTN, DM2, CVA, breast CA-bilateral mastectomy, recurrent aspiration pneumonia/respiratory failure, and CKD, 8/11/23 p/w acute hypercapnic respiratory failure; c/b RUSS    (1)Renal - RUSS on CKD4 ==>newly ESRD-HD as of this admission. Last dialyzed Friday     (2)CV- tenuous hemodynamics - intermittently requiring pressor gtt/on q6h Midodrine     (3)Pulm- trach/vent-dependent    (4)Anemia- on Epogen with HD    (5)GOC - s/p repeated discussions with family regarding GOC - family persisting with interest in aggressive measures.     RECOMMEND:  (1)Will plan for next HD once access obtained    Nilda Espinoza DNP  [facility]  [phone]     RENAL ATTENDING NOTE  Patient seen and examined with NP. Agree with assessment and plan as above.  Can forgo shiley placement today as HD is not urgently needed for now    Jimmy Colon MD  [facility]  [phone]

## 2023-11-20 NOTE — PROGRESS NOTE ADULT - SUBJECTIVE AND OBJECTIVE BOX
Patient is a 73y old  Female who presents with a chief complaint of Respiratory distress (20 Nov 2023 13:04)      SUBJECTIVE / OVERNIGHT EVENTS: awaiting PAC placement in IR to resume HD, last HD on 11/17, only 600 cc removed 2/2 poor flow    MEDICATIONS  (STANDING):  albuterol/ipratropium for Nebulization 3 milliLiter(s) Nebulizer every 6 hours  artificial tears (preservative free) Ophthalmic Solution 1 Drop(s) Both EYES every 4 hours  aspirin  chewable 81 milliGRAM(s) Enteral Tube daily  atorvastatin 10 milliGRAM(s) Oral at bedtime  calamine/zinc oxide Lotion 1 Application(s) Topical daily  chlorhexidine 0.12% Liquid 15 milliLiter(s) Oral Mucosa every 12 hours  chlorhexidine 2% Cloths 1 Application(s) Topical daily  chlorhexidine 4% Liquid 1 Application(s) Topical <User Schedule>  dextrose 5%. 1000 milliLiter(s) (50 mL/Hr) IV Continuous <Continuous>  dextrose 5%. 1000 milliLiter(s) (100 mL/Hr) IV Continuous <Continuous>  dextrose 50% Injectable 12.5 Gram(s) IV Push once  dextrose 50% Injectable 25 Gram(s) IV Push once  dextrose 50% Injectable 25 Gram(s) IV Push once  dextrose 50% Injectable 25 Gram(s) IV Push once  droxidopa 600 milliGRAM(s) Oral every 8 hours  epoetin odalis (EPOGEN) Injectable 52753 Unit(s) IV Push <User Schedule>  ferrous    sulfate Liquid 300 milliGRAM(s) Oral daily  glucagon  Injectable 1 milliGRAM(s) IntraMuscular once  heparin   Injectable 5000 Unit(s) SubCutaneous every 8 hours  insulin lispro (ADMELOG) corrective regimen sliding scale   SubCutaneous every 6 hours  insulin NPH human recombinant 6 Unit(s) SubCutaneous every 6 hours  midodrine. 40 milliGRAM(s) Oral <User Schedule>  norepinephrine Infusion 0.05 MICROgram(s)/kG/Min (6.23 mL/Hr) IV Continuous <Continuous>  pantoprazole  Injectable 40 milliGRAM(s) IV Push two times a day  petrolatum Ophthalmic Ointment 1 Application(s) Both EYES four times a day  sodium chloride 1 Gram(s) Oral two times a day  sodium chloride 3%  Inhalation 4 milliLiter(s) Inhalation every 6 hours    MEDICATIONS  (PRN):  acetaminophen   Oral Liquid .. 650 milliGRAM(s) Oral every 6 hours PRN Temp greater or equal to 38C (100.4F), Mild Pain (1 - 3)  dextrose Oral Gel 15 Gram(s) Oral once PRN Blood Glucose LESS THAN 70 milliGRAM(s)/deciliter  diphenhydrAMINE Elixir 50 milliGRAM(s) Oral once PRN Rash and/or Itching  droxidopa 600 milliGRAM(s) Oral <User Schedule> PRN prior to hemodialysis  midodrine. 40 milliGRAM(s) Oral once PRN 1 Hour Pre HD  sodium chloride 0.9% Bolus. 250 milliLiter(s) IV Bolus every 5 minutes PRN SBP LESS THAN or EQUAL to 90 mmHg  sodium chloride 0.9% lock flush 10 milliLiter(s) IV Push every 1 hour PRN Pre/post blood products, medications, blood draw, and to maintain line patency      Vital Signs Last 24 Hrs  T(F): 98.2 (11-20-23 @ 16:00), Max: 99.2 (11-20-23 @ 12:00)  HR: 102 (11-20-23 @ 16:11) (93 - 114)  BP: --  RR: 24 (11-20-23 @ 16:00) (12 - 28)  SpO2: 100% (11-20-23 @ 16:11) (92% - 100%)  Telemetry:   CAPILLARY BLOOD GLUCOSE      POCT Blood Glucose.: 127 mg/dL (20 Nov 2023 11:05)  POCT Blood Glucose.: 152 mg/dL (20 Nov 2023 05:02)  POCT Blood Glucose.: 110 mg/dL (19 Nov 2023 23:02)  POCT Blood Glucose.: 156 mg/dL (19 Nov 2023 17:04)    I&O's Summary    19 Nov 2023 07:01  -  20 Nov 2023 07:00  --------------------------------------------------------  IN: 900 mL / OUT: 400 mL / NET: 500 mL    20 Nov 2023 07:01  -  20 Nov 2023 16:43  --------------------------------------------------------  IN: 360 mL / OUT: 0 mL / NET: 360 mL        PHYSICAL EXAM:  GENERAL: NAD, well-developed  HEAD:  Atraumatic, Normocephalic  EYES: EOMI, PERRLA, conjunctiva and sclera clear  NECK: Supple, No JVD  CHEST/LUNG: Clear to auscultation bilaterally; No wheeze  HEART: Regular rate and rhythm; No murmurs, rubs, or gallops  ABDOMEN: Soft, Nontender, Nondistended; Bowel sounds present  EXTREMITIES:  2+ Peripheral Pulses, No clubbing, cyanosis, or edema  PSYCH: AAOx3  NEUROLOGY: non-focal  SKIN: No rashes or lesions    LABS:                        8.8    19.27 )-----------( 422      ( 20 Nov 2023 01:50 )             30.1     11-20    134<L>  |  100  |  37<H>  ----------------------------<  135<H>  4.2   |  27  |  2.06<H>    Ca    9.1      20 Nov 2023 01:50  Phos  4.4     11-20  Mg     2.40     11-20    TPro  6.1  /  Alb  2.0<L>  /  TBili  <0.2  /  DBili  x   /  AST  18  /  ALT  14  /  AlkPhos  341<H>  11-20          Urinalysis Basic - ( 20 Nov 2023 01:50 )    Color: x / Appearance: x / SG: x / pH: x  Gluc: 135 mg/dL / Ketone: x  / Bili: x / Urobili: x   Blood: x / Protein: x / Nitrite: x   Leuk Esterase: x / RBC: x / WBC x   Sq Epi: x / Non Sq Epi: x / Bacteria: x        RADIOLOGY & ADDITIONAL TESTS:    Imaging Personally Reviewed:    Consultant(s) Notes Reviewed:      Care Discussed with Consultants/Other Providers:

## 2023-11-20 NOTE — PROGRESS NOTE ADULT - ATTENDING COMMENTS
73 year old female with very complex medical history as above. Active issues is dialysis access. Has hx of right IJ venous occlusion, left HD catheter was removed over weekend due to malpositioning and poor blood flow. Not in need of urgent HD today. Will likely need placement of HD catheter this week. Follow up with IR. Still requires PO and IV vasopressor support during dialysis sessions. CMP reviewed. Leukocytosis increased today. Continue to monitor. Monitoring off antibiotics. Overall prognosis is very poor.

## 2023-11-20 NOTE — CONSULT NOTE ADULT - CONSULT REQUESTED BY NAME
Angie Cordero
[place]
Dr. Rossi
[facility]
Alexey Candelario
[place]
Dr. Harper
Medicine
Dr. Sandy
ED
Jose Antonio Jasso
Dr ARMANDO Brady
Holly Acosta
Santos Tolentino
Yasir Muñoz
primary team
Office Pt
Dr. Benedict

## 2023-11-20 NOTE — PROGRESS NOTE ADULT - SUBJECTIVE AND OBJECTIVE BOX
INTERVAL HPI/OVERNIGHT EVENTS:    SUBJECTIVE: Patient seen and examined at bedside.     ROS: All negative except as listed above.    VITAL SIGNS:  ICU Vital Signs Last 24 Hrs  T(C): 37.3 (20 Nov 2023 12:00), Max: 37.3 (20 Nov 2023 12:00)  T(F): 99.2 (20 Nov 2023 12:00), Max: 99.2 (20 Nov 2023 12:00)  HR: 111 (20 Nov 2023 12:00) (93 - 114)  BP: --  BP(mean): --  ABP: 138/54 (20 Nov 2023 12:00) (107/36 - 148/54)  ABP(mean): 88 (20 Nov 2023 12:00) (65 - 92)  RR: 24 (20 Nov 2023 12:00) (12 - 28)  SpO2: 100% (20 Nov 2023 12:00) (92% - 100%)    O2 Parameters below as of 20 Nov 2023 12:00  Patient On (Oxygen Delivery Method): ventilator    O2 Concentration (%): 40      Mode: AC/ CMV (Assist Control/ Continuous Mandatory Ventilation), RR (machine): 24, FiO2: 40, PEEP: 8, ITime: 0.8, MAP: 20, PC: 35, PIP: 43  Plateau pressure:   P/F ratio:     11-19 @ 07:01  -  11-20 @ 07:00  --------------------------------------------------------  IN: 900 mL / OUT: 400 mL / NET: 500 mL    11-20 @ 07:01  -  11-20 @ 13:04  --------------------------------------------------------  IN: 210 mL / OUT: 0 mL / NET: 210 mL      CAPILLARY BLOOD GLUCOSE      POCT Blood Glucose.: 127 mg/dL (20 Nov 2023 11:05)      ECG: reviewed.    PHYSICAL EXAM:    GENERAL: NAD, lying in bed comfortably  HEAD:  Atraumatic, normocephalic  EYES: EOMI, PERRLA, conjunctiva and sclera clear  NECK: Supple, trachea midline, no JVD  HEART: Regular rate and rhythm, no murmurs, rubs, or gallops  LUNGS: Unlabored respirations.  Clear to auscultation bilaterally, no crackles, wheezing, or rhonchi  ABDOMEN: Soft, nontender, nondistended, +BS  EXTREMITIES: 2+ peripheral pulses bilaterally, cap refill<2 secs. No clubbing, cyanosis, or edema  NERVOUS SYSTEM:  A&Ox3, following commands, moving all extremities, no focal deficits   SKIN: No rashes or lesions    MEDICATIONS:  MEDICATIONS  (STANDING):  albuterol/ipratropium for Nebulization 3 milliLiter(s) Nebulizer every 6 hours  artificial tears (preservative free) Ophthalmic Solution 1 Drop(s) Both EYES every 4 hours  aspirin  chewable 81 milliGRAM(s) Enteral Tube daily  atorvastatin 10 milliGRAM(s) Oral at bedtime  calamine/zinc oxide Lotion 1 Application(s) Topical daily  chlorhexidine 0.12% Liquid 15 milliLiter(s) Oral Mucosa every 12 hours  chlorhexidine 2% Cloths 1 Application(s) Topical daily  chlorhexidine 4% Liquid 1 Application(s) Topical <User Schedule>  dextrose 5%. 1000 milliLiter(s) (50 mL/Hr) IV Continuous <Continuous>  dextrose 5%. 1000 milliLiter(s) (100 mL/Hr) IV Continuous <Continuous>  dextrose 50% Injectable 12.5 Gram(s) IV Push once  dextrose 50% Injectable 25 Gram(s) IV Push once  dextrose 50% Injectable 25 Gram(s) IV Push once  dextrose 50% Injectable 25 Gram(s) IV Push once  droxidopa 600 milliGRAM(s) Oral every 8 hours  epoetin odalis (EPOGEN) Injectable 19048 Unit(s) IV Push <User Schedule>  ferrous    sulfate Liquid 300 milliGRAM(s) Oral daily  glucagon  Injectable 1 milliGRAM(s) IntraMuscular once  heparin   Injectable 5000 Unit(s) SubCutaneous every 8 hours  insulin lispro (ADMELOG) corrective regimen sliding scale   SubCutaneous every 6 hours  insulin NPH human recombinant 6 Unit(s) SubCutaneous every 6 hours  midodrine. 40 milliGRAM(s) Oral <User Schedule>  norepinephrine Infusion 0.05 MICROgram(s)/kG/Min (6.23 mL/Hr) IV Continuous <Continuous>  pantoprazole  Injectable 40 milliGRAM(s) IV Push two times a day  petrolatum Ophthalmic Ointment 1 Application(s) Both EYES four times a day  sodium chloride 1 Gram(s) Oral two times a day  sodium chloride 3%  Inhalation 4 milliLiter(s) Inhalation every 6 hours    MEDICATIONS  (PRN):  acetaminophen   Oral Liquid .. 650 milliGRAM(s) Oral every 6 hours PRN Temp greater or equal to 38C (100.4F), Mild Pain (1 - 3)  dextrose Oral Gel 15 Gram(s) Oral once PRN Blood Glucose LESS THAN 70 milliGRAM(s)/deciliter  diphenhydrAMINE Elixir 50 milliGRAM(s) Oral once PRN Rash and/or Itching  droxidopa 600 milliGRAM(s) Oral <User Schedule> PRN prior to hemodialysis  midodrine. 40 milliGRAM(s) Oral once PRN 1 Hour Pre HD  sodium chloride 0.9% Bolus. 250 milliLiter(s) IV Bolus every 5 minutes PRN SBP LESS THAN or EQUAL to 90 mmHg  sodium chloride 0.9% lock flush 10 milliLiter(s) IV Push every 1 hour PRN Pre/post blood products, medications, blood draw, and to maintain line patency      ALLERGIES:  Allergies    isoniazid (Rash)  nafcillin (Unknown)  hydrALAZINE (Rash)  vitamin E (Short breath; Urticaria; Hives)  doxycycline (Rash)  cefepime (Rash)  NIFEdipine (Urticaria; Hives)  Zosyn (Rash)    Intolerances        LABS:                        8.8    19.27 )-----------( 422      ( 20 Nov 2023 01:50 )             30.1     11-20    134<L>  |  100  |  37<H>  ----------------------------<  135<H>  4.2   |  27  |  2.06<H>    Ca    9.1      20 Nov 2023 01:50  Phos  4.4     11-20  Mg     2.40     11-20    TPro  6.1  /  Alb  2.0<L>  /  TBili  <0.2  /  DBili  x   /  AST  18  /  ALT  14  /  AlkPhos  341<H>  11-20      Urinalysis Basic - ( 20 Nov 2023 01:50 )    Color: x / Appearance: x / SG: x / pH: x  Gluc: 135 mg/dL / Ketone: x  / Bili: x / Urobili: x   Blood: x / Protein: x / Nitrite: x   Leuk Esterase: x / RBC: x / WBC x   Sq Epi: x / Non Sq Epi: x / Bacteria: x      ABG:      vBG:    Micro:    Culture - Blood (collected 11-02-23 @ 09:10)  Source: .Blood Blood-Peripheral  Final Report (11-07-23 @ 12:01):    No growth at 5 days    Culture - Blood (collected 11-02-23 @ 09:00)  Source: .Blood Blood-Peripheral  Final Report (11-07-23 @ 12:01):    No growth at 5 days        Culture - Sputum (collected 11-02-23 @ 10:24)  Source: ET Tube ET Tube  Gram Stain (11-02-23 @ 15:31):    Moderate polymorphonuclear leukocytes per low power field    No Squamous epithelial cells per low power field    Moderate Gram Negative Rods per oil power field  Final Report (11-04-23 @ 22:56):    Numerous Pseudomonas aeruginosa (Carbapenem Resistant)  Organism: Pseudomonas aeruginosa (Carbapenem Resistant) (11-04-23 @ 22:56)  Organism: Pseudomonas aeruginosa (Carbapenem Resistant) (11-04-23 @ 22:56)      Method Type: CarbaR      -  Resistance Gene to Carbapenem: Nondet  Organism: Pseudomonas aeruginosa (Carbapenem Resistant) (11-04-23 @ 22:56)      Method Type: SIMON      -  Amikacin: S <=16      -  Aztreonam: R >16      -  Cefepime: I 16      -  Ceftazidime: I 16      -  Ceftazidime/Avibactam: S <=4      -  Ceftolozane/tazobactam: S <=2      -  Ciprofloxacin: R >2      -  Gentamicin: R >8      -  Imipenem: R >8      -  Levofloxacin: R >4      -  Meropenem: R >8      -  Piperacillin/Tazobactam: R >64      -  Tobramycin: R >8        RADIOLOGY & ADDITIONAL TESTS: Reviewed.   INTERVAL HPI/OVERNIGHT EVENTS:    SUBJECTIVE: Patient seen and examined at bedside. No acute overnight events. mentation unchanged    ROS: All negative except as listed above.    VITAL SIGNS:  ICU Vital Signs Last 24 Hrs  T(C): 37.3 (20 Nov 2023 12:00), Max: 37.3 (20 Nov 2023 12:00)  T(F): 99.2 (20 Nov 2023 12:00), Max: 99.2 (20 Nov 2023 12:00)  HR: 111 (20 Nov 2023 12:00) (93 - 114)  BP: --  BP(mean): --  ABP: 138/54 (20 Nov 2023 12:00) (107/36 - 148/54)  ABP(mean): 88 (20 Nov 2023 12:00) (65 - 92)  RR: 24 (20 Nov 2023 12:00) (12 - 28)  SpO2: 100% (20 Nov 2023 12:00) (92% - 100%)    O2 Parameters below as of 20 Nov 2023 12:00  Patient On (Oxygen Delivery Method): ventilator    O2 Concentration (%): 40      Mode: AC/ CMV (Assist Control/ Continuous Mandatory Ventilation), RR (machine): 24, FiO2: 40, PEEP: 8, ITime: 0.8, MAP: 20, PC: 35, PIP: 43  Plateau pressure:   P/F ratio:     11-19 @ 07:01  -  11-20 @ 07:00  --------------------------------------------------------  IN: 900 mL / OUT: 400 mL / NET: 500 mL    11-20 @ 07:01  -  11-20 @ 13:04  --------------------------------------------------------  IN: 210 mL / OUT: 0 mL / NET: 210 mL      CAPILLARY BLOOD GLUCOSE      POCT Blood Glucose.: 127 mg/dL (20 Nov 2023 11:05)      ECG: reviewed.    PHYSICAL EXAM:    CONSTITUTIONAL: Trached. Oriented x0. Opens eyes to verbal and physical stimuli. Does not follow commands or answer questions.   RESPIRATORY: +vent sounds auscultated, no rales or wheezing  CARDIOVASCULAR: Regular rate and rhythm, normal S1 and S2, no murmur/rub/gallop; 2+ edema diffusely.  Peripheral pulses are 2+ bilaterally  ABDOMEN: +NGT. Nontender to palpation, normoactive bowel sounds, no rebound/guarding; No hepatosplenomegaly  MSK: no clubbing or cyanosis of digits; no joint swelling or tenderness to palpation  Extremities: Anasarca. 3+ BLE edema to knees, 1+ edema of bilateral thighs. 3+ edema of bilateral forearms      MEDICATIONS:  MEDICATIONS  (STANDING):  albuterol/ipratropium for Nebulization 3 milliLiter(s) Nebulizer every 6 hours  artificial tears (preservative free) Ophthalmic Solution 1 Drop(s) Both EYES every 4 hours  aspirin  chewable 81 milliGRAM(s) Enteral Tube daily  atorvastatin 10 milliGRAM(s) Oral at bedtime  calamine/zinc oxide Lotion 1 Application(s) Topical daily  chlorhexidine 0.12% Liquid 15 milliLiter(s) Oral Mucosa every 12 hours  chlorhexidine 2% Cloths 1 Application(s) Topical daily  chlorhexidine 4% Liquid 1 Application(s) Topical <User Schedule>  dextrose 5%. 1000 milliLiter(s) (50 mL/Hr) IV Continuous <Continuous>  dextrose 5%. 1000 milliLiter(s) (100 mL/Hr) IV Continuous <Continuous>  dextrose 50% Injectable 12.5 Gram(s) IV Push once  dextrose 50% Injectable 25 Gram(s) IV Push once  dextrose 50% Injectable 25 Gram(s) IV Push once  dextrose 50% Injectable 25 Gram(s) IV Push once  droxidopa 600 milliGRAM(s) Oral every 8 hours  epoetin odalis (EPOGEN) Injectable 51793 Unit(s) IV Push <User Schedule>  ferrous    sulfate Liquid 300 milliGRAM(s) Oral daily  glucagon  Injectable 1 milliGRAM(s) IntraMuscular once  heparin   Injectable 5000 Unit(s) SubCutaneous every 8 hours  insulin lispro (ADMELOG) corrective regimen sliding scale   SubCutaneous every 6 hours  insulin NPH human recombinant 6 Unit(s) SubCutaneous every 6 hours  midodrine. 40 milliGRAM(s) Oral <User Schedule>  norepinephrine Infusion 0.05 MICROgram(s)/kG/Min (6.23 mL/Hr) IV Continuous <Continuous>  pantoprazole  Injectable 40 milliGRAM(s) IV Push two times a day  petrolatum Ophthalmic Ointment 1 Application(s) Both EYES four times a day  sodium chloride 1 Gram(s) Oral two times a day  sodium chloride 3%  Inhalation 4 milliLiter(s) Inhalation every 6 hours    MEDICATIONS  (PRN):  acetaminophen   Oral Liquid .. 650 milliGRAM(s) Oral every 6 hours PRN Temp greater or equal to 38C (100.4F), Mild Pain (1 - 3)  dextrose Oral Gel 15 Gram(s) Oral once PRN Blood Glucose LESS THAN 70 milliGRAM(s)/deciliter  diphenhydrAMINE Elixir 50 milliGRAM(s) Oral once PRN Rash and/or Itching  droxidopa 600 milliGRAM(s) Oral <User Schedule> PRN prior to hemodialysis  midodrine. 40 milliGRAM(s) Oral once PRN 1 Hour Pre HD  sodium chloride 0.9% Bolus. 250 milliLiter(s) IV Bolus every 5 minutes PRN SBP LESS THAN or EQUAL to 90 mmHg  sodium chloride 0.9% lock flush 10 milliLiter(s) IV Push every 1 hour PRN Pre/post blood products, medications, blood draw, and to maintain line patency      ALLERGIES:  Allergies    isoniazid (Rash)  nafcillin (Unknown)  hydrALAZINE (Rash)  vitamin E (Short breath; Urticaria; Hives)  doxycycline (Rash)  cefepime (Rash)  NIFEdipine (Urticaria; Hives)  Zosyn (Rash)    Intolerances        LABS:                        8.8    19.27 )-----------( 422      ( 20 Nov 2023 01:50 )             30.1     11-20    134<L>  |  100  |  37<H>  ----------------------------<  135<H>  4.2   |  27  |  2.06<H>    Ca    9.1      20 Nov 2023 01:50  Phos  4.4     11-20  Mg     2.40     11-20    TPro  6.1  /  Alb  2.0<L>  /  TBili  <0.2  /  DBili  x   /  AST  18  /  ALT  14  /  AlkPhos  341<H>  11-20      Urinalysis Basic - ( 20 Nov 2023 01:50 )    Color: x / Appearance: x / SG: x / pH: x  Gluc: 135 mg/dL / Ketone: x  / Bili: x / Urobili: x   Blood: x / Protein: x / Nitrite: x   Leuk Esterase: x / RBC: x / WBC x   Sq Epi: x / Non Sq Epi: x / Bacteria: x      ABG:      vBG:    Micro:    Culture - Blood (collected 11-02-23 @ 09:10)  Source: .Blood Blood-Peripheral  Final Report (11-07-23 @ 12:01):    No growth at 5 days    Culture - Blood (collected 11-02-23 @ 09:00)  Source: .Blood Blood-Peripheral  Final Report (11-07-23 @ 12:01):    No growth at 5 days        Culture - Sputum (collected 11-02-23 @ 10:24)  Source: ET Tube ET Tube  Gram Stain (11-02-23 @ 15:31):    Moderate polymorphonuclear leukocytes per low power field    No Squamous epithelial cells per low power field    Moderate Gram Negative Rods per oil power field  Final Report (11-04-23 @ 22:56):    Numerous Pseudomonas aeruginosa (Carbapenem Resistant)  Organism: Pseudomonas aeruginosa (Carbapenem Resistant) (11-04-23 @ 22:56)  Organism: Pseudomonas aeruginosa (Carbapenem Resistant) (11-04-23 @ 22:56)      Method Type: CarbaR      -  Resistance Gene to Carbapenem: Nondet  Organism: Pseudomonas aeruginosa (Carbapenem Resistant) (11-04-23 @ 22:56)      Method Type: SIMON      -  Amikacin: S <=16      -  Aztreonam: R >16      -  Cefepime: I 16      -  Ceftazidime: I 16      -  Ceftazidime/Avibactam: S <=4      -  Ceftolozane/tazobactam: S <=2      -  Ciprofloxacin: R >2      -  Gentamicin: R >8      -  Imipenem: R >8      -  Levofloxacin: R >4      -  Meropenem: R >8      -  Piperacillin/Tazobactam: R >64      -  Tobramycin: R >8        RADIOLOGY & ADDITIONAL TESTS: Reviewed.   INTERVAL HPI/OVERNIGHT EVENTS:    SUBJECTIVE: Patient seen and examined at bedside. No acute overnight events. mentation unchanged    ROS: All negative except as listed above.    VITAL SIGNS:  ICU Vital Signs Last 24 Hrs  T(C): 37.3 (20 Nov 2023 12:00), Max: 37.3 (20 Nov 2023 12:00)  T(F): 99.2 (20 Nov 2023 12:00), Max: 99.2 (20 Nov 2023 12:00)  HR: 111 (20 Nov 2023 12:00) (93 - 114)  BP: --  BP(mean): --  ABP: 138/54 (20 Nov 2023 12:00) (107/36 - 148/54)  ABP(mean): 88 (20 Nov 2023 12:00) (65 - 92)  RR: 24 (20 Nov 2023 12:00) (12 - 28)  SpO2: 100% (20 Nov 2023 12:00) (92% - 100%)    O2 Parameters below as of 20 Nov 2023 12:00  Patient On (Oxygen Delivery Method): ventilator    O2 Concentration (%): 40      Mode: AC/ CMV (Assist Control/ Continuous Mandatory Ventilation), RR (machine): 24, FiO2: 40, PEEP: 8, ITime: 0.8, MAP: 20, PC: 35, PIP: 43  Plateau pressure:   P/F ratio:     11-19 @ 07:01  -  11-20 @ 07:00  --------------------------------------------------------  IN: 900 mL / OUT: 400 mL / NET: 500 mL    11-20 @ 07:01  -  11-20 @ 13:04  --------------------------------------------------------  IN: 210 mL / OUT: 0 mL / NET: 210 mL      CAPILLARY BLOOD GLUCOSE      POCT Blood Glucose.: 127 mg/dL (20 Nov 2023 11:05)      ECG: reviewed.    PHYSICAL EXAM:    CONSTITUTIONAL: Trached. Opens eyes to verbal and physical stimuli. Does not follow commands  RESPIRATORY: +vent sounds auscultated, no rales or wheezing  CARDIOVASCULAR: Regular rate and rhythm, normal S1 and S2, no murmur/rub/gallop; 2+ edema diffusely.  Peripheral pulses are 2+ bilaterally  ABDOMEN: +NGT. Nontender to palpation, normoactive bowel sounds, no rebound/guarding; No hepatosplenomegaly  MSK: no clubbing or cyanosis of digits; no joint swelling or tenderness to palpation  Extremities: Anasarca. 3+ BLE edema to knees, 1+ edema of bilateral thighs. 3+ edema of bilateral forearms      MEDICATIONS:  MEDICATIONS  (STANDING):  albuterol/ipratropium for Nebulization 3 milliLiter(s) Nebulizer every 6 hours  artificial tears (preservative free) Ophthalmic Solution 1 Drop(s) Both EYES every 4 hours  aspirin  chewable 81 milliGRAM(s) Enteral Tube daily  atorvastatin 10 milliGRAM(s) Oral at bedtime  calamine/zinc oxide Lotion 1 Application(s) Topical daily  chlorhexidine 0.12% Liquid 15 milliLiter(s) Oral Mucosa every 12 hours  chlorhexidine 2% Cloths 1 Application(s) Topical daily  chlorhexidine 4% Liquid 1 Application(s) Topical <User Schedule>  dextrose 5%. 1000 milliLiter(s) (50 mL/Hr) IV Continuous <Continuous>  dextrose 5%. 1000 milliLiter(s) (100 mL/Hr) IV Continuous <Continuous>  dextrose 50% Injectable 12.5 Gram(s) IV Push once  dextrose 50% Injectable 25 Gram(s) IV Push once  dextrose 50% Injectable 25 Gram(s) IV Push once  dextrose 50% Injectable 25 Gram(s) IV Push once  droxidopa 600 milliGRAM(s) Oral every 8 hours  epoetin odalis (EPOGEN) Injectable 61461 Unit(s) IV Push <User Schedule>  ferrous    sulfate Liquid 300 milliGRAM(s) Oral daily  glucagon  Injectable 1 milliGRAM(s) IntraMuscular once  heparin   Injectable 5000 Unit(s) SubCutaneous every 8 hours  insulin lispro (ADMELOG) corrective regimen sliding scale   SubCutaneous every 6 hours  insulin NPH human recombinant 6 Unit(s) SubCutaneous every 6 hours  midodrine. 40 milliGRAM(s) Oral <User Schedule>  norepinephrine Infusion 0.05 MICROgram(s)/kG/Min (6.23 mL/Hr) IV Continuous <Continuous>  pantoprazole  Injectable 40 milliGRAM(s) IV Push two times a day  petrolatum Ophthalmic Ointment 1 Application(s) Both EYES four times a day  sodium chloride 1 Gram(s) Oral two times a day  sodium chloride 3%  Inhalation 4 milliLiter(s) Inhalation every 6 hours    MEDICATIONS  (PRN):  acetaminophen   Oral Liquid .. 650 milliGRAM(s) Oral every 6 hours PRN Temp greater or equal to 38C (100.4F), Mild Pain (1 - 3)  dextrose Oral Gel 15 Gram(s) Oral once PRN Blood Glucose LESS THAN 70 milliGRAM(s)/deciliter  diphenhydrAMINE Elixir 50 milliGRAM(s) Oral once PRN Rash and/or Itching  droxidopa 600 milliGRAM(s) Oral <User Schedule> PRN prior to hemodialysis  midodrine. 40 milliGRAM(s) Oral once PRN 1 Hour Pre HD  sodium chloride 0.9% Bolus. 250 milliLiter(s) IV Bolus every 5 minutes PRN SBP LESS THAN or EQUAL to 90 mmHg  sodium chloride 0.9% lock flush 10 milliLiter(s) IV Push every 1 hour PRN Pre/post blood products, medications, blood draw, and to maintain line patency      ALLERGIES:  Allergies    isoniazid (Rash)  nafcillin (Unknown)  hydrALAZINE (Rash)  vitamin E (Short breath; Urticaria; Hives)  doxycycline (Rash)  cefepime (Rash)  NIFEdipine (Urticaria; Hives)  Zosyn (Rash)    Intolerances        LABS:                        8.8    19.27 )-----------( 422      ( 20 Nov 2023 01:50 )             30.1     11-20    134<L>  |  100  |  37<H>  ----------------------------<  135<H>  4.2   |  27  |  2.06<H>    Ca    9.1      20 Nov 2023 01:50  Phos  4.4     11-20  Mg     2.40     11-20    TPro  6.1  /  Alb  2.0<L>  /  TBili  <0.2  /  DBili  x   /  AST  18  /  ALT  14  /  AlkPhos  341<H>  11-20      Urinalysis Basic - ( 20 Nov 2023 01:50 )    Color: x / Appearance: x / SG: x / pH: x  Gluc: 135 mg/dL / Ketone: x  / Bili: x / Urobili: x   Blood: x / Protein: x / Nitrite: x   Leuk Esterase: x / RBC: x / WBC x   Sq Epi: x / Non Sq Epi: x / Bacteria: x      ABG:      vBG:    Micro:    Culture - Blood (collected 11-02-23 @ 09:10)  Source: .Blood Blood-Peripheral  Final Report (11-07-23 @ 12:01):    No growth at 5 days    Culture - Blood (collected 11-02-23 @ 09:00)  Source: .Blood Blood-Peripheral  Final Report (11-07-23 @ 12:01):    No growth at 5 days        Culture - Sputum (collected 11-02-23 @ 10:24)  Source: ET Tube ET Tube  Gram Stain (11-02-23 @ 15:31):    Moderate polymorphonuclear leukocytes per low power field    No Squamous epithelial cells per low power field    Moderate Gram Negative Rods per oil power field  Final Report (11-04-23 @ 22:56):    Numerous Pseudomonas aeruginosa (Carbapenem Resistant)  Organism: Pseudomonas aeruginosa (Carbapenem Resistant) (11-04-23 @ 22:56)  Organism: Pseudomonas aeruginosa (Carbapenem Resistant) (11-04-23 @ 22:56)      Method Type: CarbaR      -  Resistance Gene to Carbapenem: Nondet  Organism: Pseudomonas aeruginosa (Carbapenem Resistant) (11-04-23 @ 22:56)      Method Type: SIMON      -  Amikacin: S <=16      -  Aztreonam: R >16      -  Cefepime: I 16      -  Ceftazidime: I 16      -  Ceftazidime/Avibactam: S <=4      -  Ceftolozane/tazobactam: S <=2      -  Ciprofloxacin: R >2      -  Gentamicin: R >8      -  Imipenem: R >8      -  Levofloxacin: R >4      -  Meropenem: R >8      -  Piperacillin/Tazobactam: R >64      -  Tobramycin: R >8        RADIOLOGY & ADDITIONAL TESTS: Reviewed.

## 2023-11-20 NOTE — CONSULT NOTE ADULT - CONSULT REASON
Oral pathology consult for "tongue lesions."
PCP
Tongue pain
Tunneled dialysis catheter
red eye
tunneled HD catheter
Code Stroke
Tunneled dialysis catheter placement
Azotemia
coffee ground emesis
resp failure
tracheostomy placement
Ear pain and discharge
bacteremia
goc
Hypotension
Renal biopsy
Cardiac Management

## 2023-11-20 NOTE — CONSULT NOTE ADULT - CONSULT REQUESTED DATE/TIME
25-Aug-2023 12:10
12-Aug-2023 08:14
03-Sep-2023 13:07
12-Aug-2023 10:43
17-Aug-2023 14:26
01-Sep-2023 15:57
08-Sep-2023 10:15
11-Aug-2023 19:51
14-Aug-2023 11:45
06-Sep-2023 15:00
26-Sep-2023 09:00
09-Oct-2023 19:54
05-Oct-2023 11:31
11-Aug-2023 10:30
05-Sep-2023
10-Nov-2023 13:16
20-Nov-2023 16:48
04-Oct-2023 13:10

## 2023-11-21 NOTE — PROVIDER CONTACT NOTE (OTHER) - ASSESSMENT
Pt rectal temp 101F
Arterial line with increased pressure.  Received 45 minutes of treatment, unable to complete ordered treatment.  Patient blood returned and Dr. Colon notified catheter not working properly.
At 1934: SpO2 100% on vent AC mode, 100 BPM, 145/46, 23RR, afebrile.
Patient alert and following commands appropriately. Does not attempt to pull at lines or tubes.
Patient awake, nonverbal. Afebrile, VS stable.   Inner left ear wound noted, with denuded skin, and small clear thin drainage.
Pt is obtunded. with a RASS of -4. Family at bedside. Pt's blood pressure is not going up with stimulation.
aPTT 63.1
redness noted on anterior chest/ trunk, F 100.4  VSS
Upon assessment Pt presents with blotchy rash covering the body
Axillary Temp of 101.5
Temp 101.5, patient noted to have facial flushing, radiating to mid-torso, /46, tachycardic 126, third dose of zosyn held.

## 2023-11-21 NOTE — PROGRESS NOTE ADULT - SUBJECTIVE AND OBJECTIVE BOX
Patient is a 73y old  Female who presents with a chief complaint of Respiratory distress (21 Nov 2023 10:32)      SUBJECTIVE / OVERNIGHT EVENTS: awaiting Permacath placement. ptn has leukocytosis, afebrile. ID F/U  reviewed . presently no HD access. HDS    MEDICATIONS  (STANDING):  albuterol/ipratropium for Nebulization 3 milliLiter(s) Nebulizer every 6 hours  artificial tears (preservative free) Ophthalmic Solution 1 Drop(s) Both EYES every 4 hours  atorvastatin 10 milliGRAM(s) Oral at bedtime  calamine/zinc oxide Lotion 1 Application(s) Topical daily  chlorhexidine 0.12% Liquid 15 milliLiter(s) Oral Mucosa every 12 hours  chlorhexidine 2% Cloths 1 Application(s) Topical daily  chlorhexidine 4% Liquid 1 Application(s) Topical <User Schedule>  dextrose 5%. 1000 milliLiter(s) (50 mL/Hr) IV Continuous <Continuous>  dextrose 5%. 1000 milliLiter(s) (100 mL/Hr) IV Continuous <Continuous>  dextrose 50% Injectable 12.5 Gram(s) IV Push once  dextrose 50% Injectable 25 Gram(s) IV Push once  dextrose 50% Injectable 25 Gram(s) IV Push once  dextrose 50% Injectable 25 Gram(s) IV Push once  droxidopa 600 milliGRAM(s) Oral every 8 hours  epoetin odalis (EPOGEN) Injectable 09126 Unit(s) IV Push <User Schedule>  ferrous    sulfate Liquid 300 milliGRAM(s) Oral daily  glucagon  Injectable 1 milliGRAM(s) IntraMuscular once  heparin   Injectable 5000 Unit(s) SubCutaneous every 8 hours  insulin lispro (ADMELOG) corrective regimen sliding scale   SubCutaneous every 6 hours  insulin NPH human recombinant 6 Unit(s) SubCutaneous every 6 hours  midodrine. 40 milliGRAM(s) Oral <User Schedule>  norepinephrine Infusion 0.05 MICROgram(s)/kG/Min (6.23 mL/Hr) IV Continuous <Continuous>  pantoprazole  Injectable 40 milliGRAM(s) IV Push two times a day  petrolatum Ophthalmic Ointment 1 Application(s) Both EYES four times a day  sodium chloride 1 Gram(s) Oral two times a day  sodium chloride 3%  Inhalation 4 milliLiter(s) Inhalation every 6 hours    MEDICATIONS  (PRN):  acetaminophen   Oral Liquid .. 650 milliGRAM(s) Oral every 6 hours PRN Temp greater or equal to 38C (100.4F), Mild Pain (1 - 3)  dextrose Oral Gel 15 Gram(s) Oral once PRN Blood Glucose LESS THAN 70 milliGRAM(s)/deciliter  diphenhydrAMINE Elixir 50 milliGRAM(s) Oral once PRN Rash and/or Itching  droxidopa 600 milliGRAM(s) Oral <User Schedule> PRN prior to hemodialysis  midodrine. 40 milliGRAM(s) Oral once PRN 1 Hour Pre HD  sodium chloride 0.9% Bolus. 250 milliLiter(s) IV Bolus every 5 minutes PRN SBP LESS THAN or EQUAL to 90 mmHg  sodium chloride 0.9% lock flush 10 milliLiter(s) IV Push every 1 hour PRN Pre/post blood products, medications, blood draw, and to maintain line patency      Vital Signs Last 24 Hrs  T(F): 97.3 (11-21-23 @ 20:00), Max: 99.6 (11-21-23 @ 12:00)  HR: 100 (11-21-23 @ 20:00) (89 - 120)  BP: --  RR: 24 (11-21-23 @ 20:00) (17 - 30)  SpO2: 97% (11-21-23 @ 20:00) (95% - 100%)  Telemetry:   CAPILLARY BLOOD GLUCOSE      POCT Blood Glucose.: 111 mg/dL (21 Nov 2023 17:42)  POCT Blood Glucose.: 112 mg/dL (21 Nov 2023 12:44)  POCT Blood Glucose.: 103 mg/dL (21 Nov 2023 06:18)  POCT Blood Glucose.: 167 mg/dL (20 Nov 2023 23:19)    I&O's Summary    20 Nov 2023 07:01  -  21 Nov 2023 07:00  --------------------------------------------------------  IN: 630 mL / OUT: 200 mL / NET: 430 mL    21 Nov 2023 07:01  -  21 Nov 2023 20:45  --------------------------------------------------------  IN: 113.3 mL / OUT: 0 mL / NET: 113.3 mL        PHYSICAL EXAM:  GENERAL: NAD, well-developed  HEAD:  Atraumatic, Normocephalic  EYES: EOMI, PERRLA, conjunctiva and sclera clear  NECK: Supple, No JVD  CHEST/LUNG: Clear to auscultation bilaterally; No wheeze  HEART: Regular rate and rhythm; No murmurs, rubs, or gallops  ABDOMEN: Soft, Nontender, Nondistended; Bowel sounds present  EXTREMITIES:  2+ Peripheral Pulses, No clubbing, cyanosis, or edema  PSYCH: AAOx3  NEUROLOGY: non-focal  SKIN: No rashes or lesions    LABS:                        9.4    21.64 )-----------( 486      ( 21 Nov 2023 04:39 )             33.1     11-21    132<L>  |  98  |  46<H>  ----------------------------<  124<H>  4.4   |  26  |  2.04<H>    Ca    9.2      21 Nov 2023 04:39  Phos  4.9     11-21  Mg     2.40     11-21    TPro  6.6  /  Alb  2.1<L>  /  TBili  <0.2  /  DBili  x   /  AST  28  /  ALT  20  /  AlkPhos  409<H>  11-21    PT/INR - ( 21 Nov 2023 04:39 )   PT: 11.4 sec;   INR: 1.02 ratio         PTT - ( 21 Nov 2023 04:39 )  PTT:27.6 sec      Urinalysis Basic - ( 21 Nov 2023 04:39 )    Color: x / Appearance: x / SG: x / pH: x  Gluc: 124 mg/dL / Ketone: x  / Bili: x / Urobili: x   Blood: x / Protein: x / Nitrite: x   Leuk Esterase: x / RBC: x / WBC x   Sq Epi: x / Non Sq Epi: x / Bacteria: x        RADIOLOGY & ADDITIONAL TESTS:    Imaging Personally Reviewed:    Consultant(s) Notes Reviewed:      Care Discussed with Consultants/Other Providers:

## 2023-11-21 NOTE — PROGRESS NOTE ADULT - ASSESSMENT
73 paola old with DM, prior CVA, kidney disease  Now with respiratory failure  Requiring HD- but difficulty tolerating    S/p bronch with removal of secretions  Vent setting stable    Leukocytosis    Known colonization with MDRO pseudomonas    Leukocytosis is likely multifactorial.  Her WBC has been elevated since early October.     She will remain at high risk for recurrent infection including pneumonia, soft tissue injections and blood stream infections.   The risk of infection is higher with temporary lines than with a permcath.     If there is concern for ifnection repeat blood cultures, sputum culture and imaging.     Her overall prognosis is grave.  Her airways are chronically colonized.  She is at risk for recurrent pna with MDRO organisms and has limited treatment options. Prior cultures with MDR organisms and she had a drug reaction to cephalosporins.  At this time, her respiratory status is stable.     If she has a change in status, would give polymixin, vanco, and flagyl pending imaging and cultures.     If her status changes, repeat imaging and cultures  Ultimately, the decision re permcath placement is a risk benefit decision.     Prognosis grave. Options limited  Continue GOC discussions      Call ID service with questions

## 2023-11-21 NOTE — PROGRESS NOTE ADULT - REASON FOR ADMISSION
Problem: Pain:  Goal: Pain level will decrease  Pain level will decrease   Outcome: Ongoing    Goal: Control of acute pain  Control of acute pain   Outcome: Ongoing    Goal: Control of chronic pain  Control of chronic pain   Outcome: Ongoing      Problem: Discharge Planning:  Goal: Discharged to appropriate level of care  Discharged to appropriate level of care   Outcome: Ongoing Respiratory distress

## 2023-11-21 NOTE — PROGRESS NOTE ADULT - SUBJECTIVE AND OBJECTIVE BOX
Follow Up:      Inverval History/ROS:Patient is a 73y old  Female who presents with a chief complaint of Respiratory distress (21 Nov 2023 10:14)    Trach  On vent   No fever      Allergies    isoniazid (Rash)  nafcillin (Unknown)  hydrALAZINE (Rash)  vitamin E (Short breath; Urticaria; Hives)  doxycycline (Rash)  cefepime (Rash)  NIFEdipine (Urticaria; Hives)  Zosyn (Rash)    Intolerances        ANTIMICROBIALS:      OTHER MEDS:  acetaminophen   Oral Liquid .. 650 milliGRAM(s) Oral every 6 hours PRN  albuterol/ipratropium for Nebulization 3 milliLiter(s) Nebulizer every 6 hours  artificial tears (preservative free) Ophthalmic Solution 1 Drop(s) Both EYES every 4 hours  aspirin  chewable 81 milliGRAM(s) Enteral Tube daily  atorvastatin 10 milliGRAM(s) Oral at bedtime  calamine/zinc oxide Lotion 1 Application(s) Topical daily  chlorhexidine 0.12% Liquid 15 milliLiter(s) Oral Mucosa every 12 hours  chlorhexidine 2% Cloths 1 Application(s) Topical daily  chlorhexidine 4% Liquid 1 Application(s) Topical <User Schedule>  dextrose 5%. 1000 milliLiter(s) IV Continuous <Continuous>  dextrose 5%. 1000 milliLiter(s) IV Continuous <Continuous>  dextrose 50% Injectable 25 Gram(s) IV Push once  dextrose 50% Injectable 25 Gram(s) IV Push once  dextrose 50% Injectable 12.5 Gram(s) IV Push once  dextrose 50% Injectable 25 Gram(s) IV Push once  dextrose Oral Gel 15 Gram(s) Oral once PRN  diphenhydrAMINE Elixir 50 milliGRAM(s) Oral once PRN  droxidopa 600 milliGRAM(s) Oral every 8 hours  droxidopa 600 milliGRAM(s) Oral <User Schedule> PRN  epoetin odalis (EPOGEN) Injectable 49948 Unit(s) IV Push <User Schedule>  ferrous    sulfate Liquid 300 milliGRAM(s) Oral daily  glucagon  Injectable 1 milliGRAM(s) IntraMuscular once  insulin lispro (ADMELOG) corrective regimen sliding scale   SubCutaneous every 6 hours  insulin NPH human recombinant 6 Unit(s) SubCutaneous every 6 hours  midodrine. 40 milliGRAM(s) Oral <User Schedule>  midodrine. 40 milliGRAM(s) Oral once PRN  pantoprazole  Injectable 40 milliGRAM(s) IV Push two times a day  petrolatum Ophthalmic Ointment 1 Application(s) Both EYES four times a day  sodium chloride 1 Gram(s) Oral two times a day  sodium chloride 0.9% Bolus. 250 milliLiter(s) IV Bolus every 5 minutes PRN  sodium chloride 0.9% lock flush 10 milliLiter(s) IV Push every 1 hour PRN  sodium chloride 3%  Inhalation 4 milliLiter(s) Inhalation every 6 hours      Vital Signs Last 24 Hrs  T(C): 37.2 (21 Nov 2023 04:00), Max: 37.3 (20 Nov 2023 12:00)  T(F): 99 (21 Nov 2023 04:00), Max: 99.2 (20 Nov 2023 12:00)  HR: 102 (21 Nov 2023 10:08) (94 - 120)  BP: --  BP(mean): --  RR: 24 (21 Nov 2023 08:00) (17 - 24)  SpO2: 100% (21 Nov 2023 10:08) (95% - 100%)    Parameters below as of 21 Nov 2023 10:08  Patient On (Oxygen Delivery Method): ventilator        PHYSICAL EXAM:  General: [x ] trach  HEAD/EYES: [ ] PERRL [ x] white sclera [ ] icterus  ENT:  [ ] normal [x ] supple [ ] thrush [ ] pharyngeal exudate  Cardiovascular:   [ ] murmur [x ] normal [ ] PPM/AICD  Respiratory:  [ x]course BS  GI:  [x ] soft, non-tender, normal bowel sounds  :  [ ] willard [ ] no CVA tenderness   Musculoskeletal:  [ ] no synovitis  Neurologic:  [ ] non-focal exam   Skin:  [x ] no rash  Lymph: [ ] no lymphadenopathy  Psychiatric:  [ ] appropriate affect [ ] alert & oriented  Lines:  [x ] no phlebitis [ ] central line                                9.4    21.64 )-----------( 486      ( 21 Nov 2023 04:39 )             33.1       11-21    132<L>  |  98  |  46<H>  ----------------------------<  124<H>  4.4   |  26  |  2.04<H>    Ca    9.2      21 Nov 2023 04:39  Phos  4.9     11-21  Mg     2.40     11-21    TPro  6.6  /  Alb  2.1<L>  /  TBili  <0.2  /  DBili  x   /  AST  28  /  ALT  20  /  AlkPhos  409<H>  11-21      Urinalysis Basic - ( 21 Nov 2023 04:39 )    Color: x / Appearance: x / SG: x / pH: x  Gluc: 124 mg/dL / Ketone: x  / Bili: x / Urobili: x   Blood: x / Protein: x / Nitrite: x   Leuk Esterase: x / RBC: x / WBC x   Sq Epi: x / Non Sq Epi: x / Bacteria: x        MICROBIOLOGY:Culture Results:   Culture is being performed. (11-16-23 @ 23:59)      RADIOLOGY:

## 2023-11-21 NOTE — PROVIDER CONTACT NOTE (OTHER) - BACKGROUND
Patient admitted for volume overload, respiratory failure.
Patient on heparin drip; vital signs at baseline - patient usually hypotensive. Afebrile.
Respiratory Failure
at 2356 afebrile at 98.5F, BP 85/33, HR 97, SpO2 97% and RR 15. No s/s of respiratory distress noted
Admitted for respiratory failure
Dx:  Respiratory failure due to ASP PNA
History of HD for progressive CKD
IDDM; HTN CKD HFpEF; CVA
Pt is admitted to the ICU for repirtaoty distress. Currently trached and on dialysis.
CA, HTN, Breast cancer, respiratory failure, DM, dialysis
Respiratory failure, HTN, Diabetes
Scheduled for 2 hours HD with removal of 0.5 Kg fluid.

## 2023-11-21 NOTE — PROGRESS NOTE ADULT - SUBJECTIVE AND OBJECTIVE BOX
Subjective: Patient seen and examined. No new events except as noted.   remains in ICU     REVIEW OF SYSTEMS:  Unable to obtain       MEDICATIONS:  MEDICATIONS  (STANDING):  albuterol/ipratropium for Nebulization 3 milliLiter(s) Nebulizer every 6 hours  artificial tears (preservative free) Ophthalmic Solution 1 Drop(s) Both EYES every 4 hours  aspirin  chewable 81 milliGRAM(s) Enteral Tube daily  atorvastatin 10 milliGRAM(s) Oral at bedtime  calamine/zinc oxide Lotion 1 Application(s) Topical daily  chlorhexidine 0.12% Liquid 15 milliLiter(s) Oral Mucosa every 12 hours  chlorhexidine 2% Cloths 1 Application(s) Topical daily  chlorhexidine 4% Liquid 1 Application(s) Topical <User Schedule>  dextrose 5%. 1000 milliLiter(s) (50 mL/Hr) IV Continuous <Continuous>  dextrose 5%. 1000 milliLiter(s) (100 mL/Hr) IV Continuous <Continuous>  dextrose 50% Injectable 12.5 Gram(s) IV Push once  dextrose 50% Injectable 25 Gram(s) IV Push once  dextrose 50% Injectable 25 Gram(s) IV Push once  dextrose 50% Injectable 25 Gram(s) IV Push once  droxidopa 600 milliGRAM(s) Oral every 8 hours  epoetin odalis (EPOGEN) Injectable 19386 Unit(s) IV Push <User Schedule>  ferrous    sulfate Liquid 300 milliGRAM(s) Oral daily  glucagon  Injectable 1 milliGRAM(s) IntraMuscular once  insulin lispro (ADMELOG) corrective regimen sliding scale   SubCutaneous every 6 hours  insulin NPH human recombinant 6 Unit(s) SubCutaneous every 6 hours  midodrine. 40 milliGRAM(s) Oral <User Schedule>  pantoprazole  Injectable 40 milliGRAM(s) IV Push two times a day  petrolatum Ophthalmic Ointment 1 Application(s) Both EYES four times a day  sodium chloride 1 Gram(s) Oral two times a day  sodium chloride 3%  Inhalation 4 milliLiter(s) Inhalation every 6 hours      PHYSICAL EXAM:  T(C): 37.2 (11-21-23 @ 04:00), Max: 37.3 (11-20-23 @ 12:00)  HR: 102 (11-21-23 @ 10:08) (94 - 120)  BP: --  RR: 24 (11-21-23 @ 08:00) (17 - 24)  SpO2: 100% (11-21-23 @ 10:08) (95% - 100%)  Wt(kg): --  I&O's Summary    20 Nov 2023 07:01  -  21 Nov 2023 07:00  --------------------------------------------------------  IN: 630 mL / OUT: 200 mL / NET: 430 mL        Appearance: NAD, +trach  +NGT  HEENT: dry oral mucosa  Lymphatic: No lymphadenopathy  Cardiovascular: Normal S1 S2, No JVD, No murmurs, No edema  Respiratory: Decreased BS, + trach to vent	  Neuro: opens eyes  Gastrointestinal: Soft, Non-tender, + BS, + NGT  Skin: No rashes, No ecchymoses, No cyanosis	  Extremities: No strength/ROM 2/2 sedation, + BL LE edema  Vascular: Peripheral pulses palpable 2+ bilaterally  Anasarca   Mode: AC/ CMV (Assist Control/ Continuous Mandatory Ventilation), RR (machine): 24, FiO2: 40, PEEP: 8, ITime: 0.8, MAP: 19, PC: 35, PIP: 38    LABS:    CARDIAC MARKERS:                                9.4    21.64 )-----------( 486      ( 21 Nov 2023 04:39 )             33.1     11-21    132<L>  |  98  |  46<H>  ----------------------------<  124<H>  4.4   |  26  |  2.04<H>    Ca    9.2      21 Nov 2023 04:39  Phos  4.9     11-21  Mg     2.40     11-21    TPro  6.6  /  Alb  2.1<L>  /  TBili  <0.2  /  DBili  x   /  AST  28  /  ALT  20  /  AlkPhos  409<H>  11-21        TELEMETRY: 	SR    ECG:  	  RADIOLOGY:   DIAGNOSTIC TESTING:  [ ] Echocardiogram:  [ ]  Catheterization:  [ ] Stress Test:    OTHER:

## 2023-11-21 NOTE — PROVIDER CONTACT NOTE (OTHER) - DATE AND TIME:
15-Oct-2023 18:30
20-Sep-2023 18:10
22-Sep-2023 18:21
26-Sep-2023 00:55
19-Sep-2023 19:53
02-Oct-2023 23:43
22-Aug-2023 23:47
14-Oct-2023 21:13
18-Sep-2023 19:58
21-Sep-2023 20:00
03-Sep-2023 17:40
05-Sep-2023 10:01
13-Oct-2023 22:01
21-Nov-2023 11:00

## 2023-11-21 NOTE — PROVIDER CONTACT NOTE (OTHER) - RECOMMENDATIONS
MICU team to come to CTICU to place another ultrasound guided IV or accucath with blood return to run norepinephrine 8mg in 250ml
Notify ACP
Contact Provider
Notify ACP
Notify Provider
Wound consult. Wound cleansed with saline water, cavillon applied to surrounding area.
BETTINA Crooks assessed patient and verified that patient does not need restraints at this time.
MD recommendation to discontinue ordered treatment at this time.
Notify md
Notify ACP
notify provider
Provider Notified
Provider notified, Tylenol Prn administered
Provider notified.

## 2023-11-21 NOTE — PROGRESS NOTE ADULT - SUBJECTIVE AND OBJECTIVE BOX
INTERVAL HPI/OVERNIGHT EVENTS:    SUBJECTIVE: Patient seen and examined at bedside. No acute overnight events. Mentation unchanged.     ROS: All negative except as listed above.    VITAL SIGNS:  ICU Vital Signs Last 24 Hrs  T(C): 37.2 (21 Nov 2023 04:00), Max: 37.3 (20 Nov 2023 12:00)  T(F): 99 (21 Nov 2023 04:00), Max: 99.2 (20 Nov 2023 12:00)  HR: 114 (21 Nov 2023 07:00) (94 - 120)  BP: --  BP(mean): --  ABP: 110/41 (21 Nov 2023 07:00) (108/39 - 148/54)  ABP(mean): 68 (21 Nov 2023 07:00) (66 - 93)  RR: 24 (21 Nov 2023 07:00) (17 - 24)  SpO2: 99% (21 Nov 2023 07:00) (95% - 100%)    O2 Parameters below as of 21 Nov 2023 07:00  Patient On (Oxygen Delivery Method): ventilator    O2 Concentration (%): 40      Mode: AC/ CMV (Assist Control/ Continuous Mandatory Ventilation), RR (machine): 24, FiO2: 40, PEEP: 8, ITime: 0.82, MAP: 16, PC: 35, PIP: 40  Plateau pressure:   P/F ratio:     11-20 @ 07:01  -  11-21 @ 07:00  --------------------------------------------------------  IN: 630 mL / OUT: 200 mL / NET: 430 mL      CAPILLARY BLOOD GLUCOSE      POCT Blood Glucose.: 103 mg/dL (21 Nov 2023 06:18)      ECG: reviewed.    PHYSICAL EXAM:    CONSTITUTIONAL: Trached. Opens eyes to verbal and physical stimuli. Does not follow commands  RESPIRATORY: +vent and coarse breath sounds auscultated  CARDIOVASCULAR: Regular rate and rhythm, normal S1 and S2, no murmur/rub/gallop; 2+ edema diffusely.  Peripheral pulses are 2+ bilaterally  ABDOMEN: +NGT. Nontender to palpation, normoactive bowel sounds, no rebound/guarding; No hepatosplenomegaly  MSK: no clubbing or cyanosis of digits; no joint swelling or tenderness to palpation  Extremities: Anasarca. 3+ BLE edema to knees, 1+ edema of bilateral thighs. 3+ edema of bilateral forearms      MEDICATIONS:  MEDICATIONS  (STANDING):  albuterol/ipratropium for Nebulization 3 milliLiter(s) Nebulizer every 6 hours  artificial tears (preservative free) Ophthalmic Solution 1 Drop(s) Both EYES every 4 hours  aspirin  chewable 81 milliGRAM(s) Enteral Tube daily  atorvastatin 10 milliGRAM(s) Oral at bedtime  calamine/zinc oxide Lotion 1 Application(s) Topical daily  chlorhexidine 0.12% Liquid 15 milliLiter(s) Oral Mucosa every 12 hours  chlorhexidine 2% Cloths 1 Application(s) Topical daily  chlorhexidine 4% Liquid 1 Application(s) Topical <User Schedule>  dextrose 5%. 1000 milliLiter(s) (50 mL/Hr) IV Continuous <Continuous>  dextrose 5%. 1000 milliLiter(s) (100 mL/Hr) IV Continuous <Continuous>  dextrose 50% Injectable 25 Gram(s) IV Push once  dextrose 50% Injectable 25 Gram(s) IV Push once  dextrose 50% Injectable 12.5 Gram(s) IV Push once  dextrose 50% Injectable 25 Gram(s) IV Push once  droxidopa 600 milliGRAM(s) Oral every 8 hours  epoetin odalis (EPOGEN) Injectable 59245 Unit(s) IV Push <User Schedule>  ferrous    sulfate Liquid 300 milliGRAM(s) Oral daily  glucagon  Injectable 1 milliGRAM(s) IntraMuscular once  insulin lispro (ADMELOG) corrective regimen sliding scale   SubCutaneous every 6 hours  insulin NPH human recombinant 6 Unit(s) SubCutaneous every 6 hours  midodrine. 40 milliGRAM(s) Oral <User Schedule>  pantoprazole  Injectable 40 milliGRAM(s) IV Push two times a day  petrolatum Ophthalmic Ointment 1 Application(s) Both EYES four times a day  sodium chloride 1 Gram(s) Oral two times a day  sodium chloride 3%  Inhalation 4 milliLiter(s) Inhalation every 6 hours    MEDICATIONS  (PRN):  acetaminophen   Oral Liquid .. 650 milliGRAM(s) Oral every 6 hours PRN Temp greater or equal to 38C (100.4F), Mild Pain (1 - 3)  dextrose Oral Gel 15 Gram(s) Oral once PRN Blood Glucose LESS THAN 70 milliGRAM(s)/deciliter  diphenhydrAMINE Elixir 50 milliGRAM(s) Oral once PRN Rash and/or Itching  droxidopa 600 milliGRAM(s) Oral <User Schedule> PRN prior to hemodialysis  midodrine. 40 milliGRAM(s) Oral once PRN 1 Hour Pre HD  sodium chloride 0.9% Bolus. 250 milliLiter(s) IV Bolus every 5 minutes PRN SBP LESS THAN or EQUAL to 90 mmHg  sodium chloride 0.9% lock flush 10 milliLiter(s) IV Push every 1 hour PRN Pre/post blood products, medications, blood draw, and to maintain line patency      ALLERGIES:  Allergies    isoniazid (Rash)  nafcillin (Unknown)  hydrALAZINE (Rash)  vitamin E (Short breath; Urticaria; Hives)  doxycycline (Rash)  cefepime (Rash)  NIFEdipine (Urticaria; Hives)  Zosyn (Rash)    Intolerances        LABS:                        9.4    21.64 )-----------( 486      ( 21 Nov 2023 04:39 )             33.1     11-21    132<L>  |  98  |  46<H>  ----------------------------<  124<H>  4.4   |  26  |  2.04<H>    Ca    9.2      21 Nov 2023 04:39  Phos  4.9     11-21  Mg     2.40     11-21    TPro  6.6  /  Alb  2.1<L>  /  TBili  <0.2  /  DBili  x   /  AST  28  /  ALT  20  /  AlkPhos  409<H>  11-21    PT/INR - ( 21 Nov 2023 04:39 )   PT: 11.4 sec;   INR: 1.02 ratio         PTT - ( 21 Nov 2023 04:39 )  PTT:27.6 sec  Urinalysis Basic - ( 21 Nov 2023 04:39 )    Color: x / Appearance: x / SG: x / pH: x  Gluc: 124 mg/dL / Ketone: x  / Bili: x / Urobili: x   Blood: x / Protein: x / Nitrite: x   Leuk Esterase: x / RBC: x / WBC x   Sq Epi: x / Non Sq Epi: x / Bacteria: x      ABG:      vBG:    Micro:    Culture - Blood (collected 11-02-23 @ 09:10)  Source: .Blood Blood-Peripheral  Final Report (11-07-23 @ 12:01):    No growth at 5 days    Culture - Blood (collected 11-02-23 @ 09:00)  Source: .Blood Blood-Peripheral  Final Report (11-07-23 @ 12:01):    No growth at 5 days        Culture - Sputum (collected 11-02-23 @ 10:24)  Source: ET Tube ET Tube  Gram Stain (11-02-23 @ 15:31):    Moderate polymorphonuclear leukocytes per low power field    No Squamous epithelial cells per low power field    Moderate Gram Negative Rods per oil power field  Final Report (11-04-23 @ 22:56):    Numerous Pseudomonas aeruginosa (Carbapenem Resistant)  Organism: Pseudomonas aeruginosa (Carbapenem Resistant) (11-04-23 @ 22:56)  Organism: Pseudomonas aeruginosa (Carbapenem Resistant) (11-04-23 @ 22:56)      Method Type: CarbaR      -  Resistance Gene to Carbapenem: Nondet  Organism: Pseudomonas aeruginosa (Carbapenem Resistant) (11-04-23 @ 22:56)      Method Type: SIMON      -  Amikacin: S <=16      -  Aztreonam: R >16      -  Cefepime: I 16      -  Ceftazidime: I 16      -  Ceftazidime/Avibactam: S <=4      -  Ceftolozane/tazobactam: S <=2      -  Ciprofloxacin: R >2      -  Gentamicin: R >8      -  Imipenem: R >8      -  Levofloxacin: R >4      -  Meropenem: R >8      -  Piperacillin/Tazobactam: R >64      -  Tobramycin: R >8        RADIOLOGY & ADDITIONAL TESTS: Reviewed.   INTERVAL HPI/OVERNIGHT EVENTS:    SUBJECTIVE: Patient seen and examined at bedside. No acute overnight events. Mentation unchanged.     ROS: All negative except as listed above.    VITAL SIGNS:  ICU Vital Signs Last 24 Hrs  T(C): 37.2 (21 Nov 2023 04:00), Max: 37.3 (20 Nov 2023 12:00)  T(F): 99 (21 Nov 2023 04:00), Max: 99.2 (20 Nov 2023 12:00)  HR: 114 (21 Nov 2023 07:00) (94 - 120)  BP: --  BP(mean): --  ABP: 110/41 (21 Nov 2023 07:00) (108/39 - 148/54)  ABP(mean): 68 (21 Nov 2023 07:00) (66 - 93)  RR: 24 (21 Nov 2023 07:00) (17 - 24)  SpO2: 99% (21 Nov 2023 07:00) (95% - 100%)    O2 Parameters below as of 21 Nov 2023 07:00  Patient On (Oxygen Delivery Method): ventilator    O2 Concentration (%): 40      Mode: AC/ CMV (Assist Control/ Continuous Mandatory Ventilation), RR (machine): 24, FiO2: 40, PEEP: 8, ITime: 0.82, MAP: 16, PC: 35, PIP: 40  Plateau pressure:   P/F ratio:     11-20 @ 07:01  -  11-21 @ 07:00  --------------------------------------------------------  IN: 630 mL / OUT: 200 mL / NET: 430 mL      CAPILLARY BLOOD GLUCOSE      POCT Blood Glucose.: 103 mg/dL (21 Nov 2023 06:18)      ECG: reviewed.    PHYSICAL EXAM:    CONSTITUTIONAL: Trached, critically ill appearing.   RESPIRATORY: +vent and coarse breath sounds auscultated  CARDIOVASCULAR: Tachycardic and regular rhythm, normal S1 and S2, no murmur/rub/gallop;  Peripheral pulses are 2+ bilaterally  ABDOMEN: +NGT. Nontender to palpation, normoactive bowel sounds, no rebound/guarding; No hepatosplenomegaly. Rectal tube w/ black output  MSK: no clubbing or cyanosis of digits; no joint swelling or tenderness to palpation  Extremities: Anasarca. 3+ BLE edema to knees, 1+ edema of bilateral thighs. 3+ edema of bilateral forearms  Neuro: Intermittently opens eyes to verbal and physical stimuli. Does not follow commands      MEDICATIONS:  MEDICATIONS  (STANDING):  albuterol/ipratropium for Nebulization 3 milliLiter(s) Nebulizer every 6 hours  artificial tears (preservative free) Ophthalmic Solution 1 Drop(s) Both EYES every 4 hours  aspirin  chewable 81 milliGRAM(s) Enteral Tube daily  atorvastatin 10 milliGRAM(s) Oral at bedtime  calamine/zinc oxide Lotion 1 Application(s) Topical daily  chlorhexidine 0.12% Liquid 15 milliLiter(s) Oral Mucosa every 12 hours  chlorhexidine 2% Cloths 1 Application(s) Topical daily  chlorhexidine 4% Liquid 1 Application(s) Topical <User Schedule>  dextrose 5%. 1000 milliLiter(s) (50 mL/Hr) IV Continuous <Continuous>  dextrose 5%. 1000 milliLiter(s) (100 mL/Hr) IV Continuous <Continuous>  dextrose 50% Injectable 25 Gram(s) IV Push once  dextrose 50% Injectable 25 Gram(s) IV Push once  dextrose 50% Injectable 12.5 Gram(s) IV Push once  dextrose 50% Injectable 25 Gram(s) IV Push once  droxidopa 600 milliGRAM(s) Oral every 8 hours  epoetin odalis (EPOGEN) Injectable 24203 Unit(s) IV Push <User Schedule>  ferrous    sulfate Liquid 300 milliGRAM(s) Oral daily  glucagon  Injectable 1 milliGRAM(s) IntraMuscular once  insulin lispro (ADMELOG) corrective regimen sliding scale   SubCutaneous every 6 hours  insulin NPH human recombinant 6 Unit(s) SubCutaneous every 6 hours  midodrine. 40 milliGRAM(s) Oral <User Schedule>  pantoprazole  Injectable 40 milliGRAM(s) IV Push two times a day  petrolatum Ophthalmic Ointment 1 Application(s) Both EYES four times a day  sodium chloride 1 Gram(s) Oral two times a day  sodium chloride 3%  Inhalation 4 milliLiter(s) Inhalation every 6 hours    MEDICATIONS  (PRN):  acetaminophen   Oral Liquid .. 650 milliGRAM(s) Oral every 6 hours PRN Temp greater or equal to 38C (100.4F), Mild Pain (1 - 3)  dextrose Oral Gel 15 Gram(s) Oral once PRN Blood Glucose LESS THAN 70 milliGRAM(s)/deciliter  diphenhydrAMINE Elixir 50 milliGRAM(s) Oral once PRN Rash and/or Itching  droxidopa 600 milliGRAM(s) Oral <User Schedule> PRN prior to hemodialysis  midodrine. 40 milliGRAM(s) Oral once PRN 1 Hour Pre HD  sodium chloride 0.9% Bolus. 250 milliLiter(s) IV Bolus every 5 minutes PRN SBP LESS THAN or EQUAL to 90 mmHg  sodium chloride 0.9% lock flush 10 milliLiter(s) IV Push every 1 hour PRN Pre/post blood products, medications, blood draw, and to maintain line patency      ALLERGIES:  Allergies    isoniazid (Rash)  nafcillin (Unknown)  hydrALAZINE (Rash)  vitamin E (Short breath; Urticaria; Hives)  doxycycline (Rash)  cefepime (Rash)  NIFEdipine (Urticaria; Hives)  Zosyn (Rash)    Intolerances        LABS:                        9.4    21.64 )-----------( 486      ( 21 Nov 2023 04:39 )             33.1     11-21    132<L>  |  98  |  46<H>  ----------------------------<  124<H>  4.4   |  26  |  2.04<H>    Ca    9.2      21 Nov 2023 04:39  Phos  4.9     11-21  Mg     2.40     11-21    TPro  6.6  /  Alb  2.1<L>  /  TBili  <0.2  /  DBili  x   /  AST  28  /  ALT  20  /  AlkPhos  409<H>  11-21    PT/INR - ( 21 Nov 2023 04:39 )   PT: 11.4 sec;   INR: 1.02 ratio         PTT - ( 21 Nov 2023 04:39 )  PTT:27.6 sec  Urinalysis Basic - ( 21 Nov 2023 04:39 )    Color: x / Appearance: x / SG: x / pH: x  Gluc: 124 mg/dL / Ketone: x  / Bili: x / Urobili: x   Blood: x / Protein: x / Nitrite: x   Leuk Esterase: x / RBC: x / WBC x   Sq Epi: x / Non Sq Epi: x / Bacteria: x      ABG:      vBG:    Micro:    Culture - Blood (collected 11-02-23 @ 09:10)  Source: .Blood Blood-Peripheral  Final Report (11-07-23 @ 12:01):    No growth at 5 days    Culture - Blood (collected 11-02-23 @ 09:00)  Source: .Blood Blood-Peripheral  Final Report (11-07-23 @ 12:01):    No growth at 5 days        Culture - Sputum (collected 11-02-23 @ 10:24)  Source: ET Tube ET Tube  Gram Stain (11-02-23 @ 15:31):    Moderate polymorphonuclear leukocytes per low power field    No Squamous epithelial cells per low power field    Moderate Gram Negative Rods per oil power field  Final Report (11-04-23 @ 22:56):    Numerous Pseudomonas aeruginosa (Carbapenem Resistant)  Organism: Pseudomonas aeruginosa (Carbapenem Resistant) (11-04-23 @ 22:56)  Organism: Pseudomonas aeruginosa (Carbapenem Resistant) (11-04-23 @ 22:56)      Method Type: CarbaR      -  Resistance Gene to Carbapenem: Nondet  Organism: Pseudomonas aeruginosa (Carbapenem Resistant) (11-04-23 @ 22:56)      Method Type: SIMON      -  Amikacin: S <=16      -  Aztreonam: R >16      -  Cefepime: I 16      -  Ceftazidime: I 16      -  Ceftazidime/Avibactam: S <=4      -  Ceftolozane/tazobactam: S <=2      -  Ciprofloxacin: R >2      -  Gentamicin: R >8      -  Imipenem: R >8      -  Levofloxacin: R >4      -  Meropenem: R >8      -  Piperacillin/Tazobactam: R >64      -  Tobramycin: R >8        RADIOLOGY & ADDITIONAL TESTS: Reviewed.   INTERVAL HPI/OVERNIGHT EVENTS:    SUBJECTIVE: Patient seen and examined at bedside. No acute overnight events. Mentation unchanged.     ROS: All negative except as listed above.    VITAL SIGNS:  ICU Vital Signs Last 24 Hrs  T(C): 37.2 (21 Nov 2023 04:00), Max: 37.3 (20 Nov 2023 12:00)  T(F): 99 (21 Nov 2023 04:00), Max: 99.2 (20 Nov 2023 12:00)  HR: 114 (21 Nov 2023 07:00) (94 - 120)  BP: --  BP(mean): --  ABP: 110/41 (21 Nov 2023 07:00) (108/39 - 148/54)  ABP(mean): 68 (21 Nov 2023 07:00) (66 - 93)  RR: 24 (21 Nov 2023 07:00) (17 - 24)  SpO2: 99% (21 Nov 2023 07:00) (95% - 100%)    O2 Parameters below as of 21 Nov 2023 07:00  Patient On (Oxygen Delivery Method): ventilator    O2 Concentration (%): 40      Mode: AC/ CMV (Assist Control/ Continuous Mandatory Ventilation), RR (machine): 24, FiO2: 40, PEEP: 8, ITime: 0.82, MAP: 16, PC: 35, PIP: 40  Plateau pressure:   P/F ratio:     11-20 @ 07:01  -  11-21 @ 07:00  --------------------------------------------------------  IN: 630 mL / OUT: 200 mL / NET: 430 mL      CAPILLARY BLOOD GLUCOSE      POCT Blood Glucose.: 103 mg/dL (21 Nov 2023 06:18)      ECG: reviewed.    PHYSICAL EXAM:    CONSTITUTIONAL: Trached, critically ill    RESPIRATORY: +vent and coarse breath sounds auscultated  CARDIOVASCULAR: Tachycardic and regular rhythm, normal S1 and S2, no murmur/rub/gallop;  Peripheral pulses are 2+ bilaterally  ABDOMEN: +NGT. Nontender to palpation, normoactive bowel sounds, no rebound/guarding; No hepatosplenomegaly. Rectal tube w/ black output  MSK: no clubbing or cyanosis of digits; no joint swelling or tenderness to palpation  Extremities: Anasarca. 3+ BLE edema to knees, 1+ edema of bilateral thighs. 3+ edema of bilateral forearms  Neuro: Intermittently has minimal eye opening w/ verbal and physical stimuli. Does not follow commands      MEDICATIONS:  MEDICATIONS  (STANDING):  albuterol/ipratropium for Nebulization 3 milliLiter(s) Nebulizer every 6 hours  artificial tears (preservative free) Ophthalmic Solution 1 Drop(s) Both EYES every 4 hours  aspirin  chewable 81 milliGRAM(s) Enteral Tube daily  atorvastatin 10 milliGRAM(s) Oral at bedtime  calamine/zinc oxide Lotion 1 Application(s) Topical daily  chlorhexidine 0.12% Liquid 15 milliLiter(s) Oral Mucosa every 12 hours  chlorhexidine 2% Cloths 1 Application(s) Topical daily  chlorhexidine 4% Liquid 1 Application(s) Topical <User Schedule>  dextrose 5%. 1000 milliLiter(s) (50 mL/Hr) IV Continuous <Continuous>  dextrose 5%. 1000 milliLiter(s) (100 mL/Hr) IV Continuous <Continuous>  dextrose 50% Injectable 25 Gram(s) IV Push once  dextrose 50% Injectable 25 Gram(s) IV Push once  dextrose 50% Injectable 12.5 Gram(s) IV Push once  dextrose 50% Injectable 25 Gram(s) IV Push once  droxidopa 600 milliGRAM(s) Oral every 8 hours  epoetin odalis (EPOGEN) Injectable 25557 Unit(s) IV Push <User Schedule>  ferrous    sulfate Liquid 300 milliGRAM(s) Oral daily  glucagon  Injectable 1 milliGRAM(s) IntraMuscular once  insulin lispro (ADMELOG) corrective regimen sliding scale   SubCutaneous every 6 hours  insulin NPH human recombinant 6 Unit(s) SubCutaneous every 6 hours  midodrine. 40 milliGRAM(s) Oral <User Schedule>  pantoprazole  Injectable 40 milliGRAM(s) IV Push two times a day  petrolatum Ophthalmic Ointment 1 Application(s) Both EYES four times a day  sodium chloride 1 Gram(s) Oral two times a day  sodium chloride 3%  Inhalation 4 milliLiter(s) Inhalation every 6 hours    MEDICATIONS  (PRN):  acetaminophen   Oral Liquid .. 650 milliGRAM(s) Oral every 6 hours PRN Temp greater or equal to 38C (100.4F), Mild Pain (1 - 3)  dextrose Oral Gel 15 Gram(s) Oral once PRN Blood Glucose LESS THAN 70 milliGRAM(s)/deciliter  diphenhydrAMINE Elixir 50 milliGRAM(s) Oral once PRN Rash and/or Itching  droxidopa 600 milliGRAM(s) Oral <User Schedule> PRN prior to hemodialysis  midodrine. 40 milliGRAM(s) Oral once PRN 1 Hour Pre HD  sodium chloride 0.9% Bolus. 250 milliLiter(s) IV Bolus every 5 minutes PRN SBP LESS THAN or EQUAL to 90 mmHg  sodium chloride 0.9% lock flush 10 milliLiter(s) IV Push every 1 hour PRN Pre/post blood products, medications, blood draw, and to maintain line patency      ALLERGIES:  Allergies    isoniazid (Rash)  nafcillin (Unknown)  hydrALAZINE (Rash)  vitamin E (Short breath; Urticaria; Hives)  doxycycline (Rash)  cefepime (Rash)  NIFEdipine (Urticaria; Hives)  Zosyn (Rash)    Intolerances        LABS:                        9.4    21.64 )-----------( 486      ( 21 Nov 2023 04:39 )             33.1     11-21    132<L>  |  98  |  46<H>  ----------------------------<  124<H>  4.4   |  26  |  2.04<H>    Ca    9.2      21 Nov 2023 04:39  Phos  4.9     11-21  Mg     2.40     11-21    TPro  6.6  /  Alb  2.1<L>  /  TBili  <0.2  /  DBili  x   /  AST  28  /  ALT  20  /  AlkPhos  409<H>  11-21    PT/INR - ( 21 Nov 2023 04:39 )   PT: 11.4 sec;   INR: 1.02 ratio         PTT - ( 21 Nov 2023 04:39 )  PTT:27.6 sec  Urinalysis Basic - ( 21 Nov 2023 04:39 )    Color: x / Appearance: x / SG: x / pH: x  Gluc: 124 mg/dL / Ketone: x  / Bili: x / Urobili: x   Blood: x / Protein: x / Nitrite: x   Leuk Esterase: x / RBC: x / WBC x   Sq Epi: x / Non Sq Epi: x / Bacteria: x      ABG:      vBG:    Micro:    Culture - Blood (collected 11-02-23 @ 09:10)  Source: .Blood Blood-Peripheral  Final Report (11-07-23 @ 12:01):    No growth at 5 days    Culture - Blood (collected 11-02-23 @ 09:00)  Source: .Blood Blood-Peripheral  Final Report (11-07-23 @ 12:01):    No growth at 5 days        Culture - Sputum (collected 11-02-23 @ 10:24)  Source: ET Tube ET Tube  Gram Stain (11-02-23 @ 15:31):    Moderate polymorphonuclear leukocytes per low power field    No Squamous epithelial cells per low power field    Moderate Gram Negative Rods per oil power field  Final Report (11-04-23 @ 22:56):    Numerous Pseudomonas aeruginosa (Carbapenem Resistant)  Organism: Pseudomonas aeruginosa (Carbapenem Resistant) (11-04-23 @ 22:56)  Organism: Pseudomonas aeruginosa (Carbapenem Resistant) (11-04-23 @ 22:56)      Method Type: CarbaR      -  Resistance Gene to Carbapenem: Nondet  Organism: Pseudomonas aeruginosa (Carbapenem Resistant) (11-04-23 @ 22:56)      Method Type: SIMON      -  Amikacin: S <=16      -  Aztreonam: R >16      -  Cefepime: I 16      -  Ceftazidime: I 16      -  Ceftazidime/Avibactam: S <=4      -  Ceftolozane/tazobactam: S <=2      -  Ciprofloxacin: R >2      -  Gentamicin: R >8      -  Imipenem: R >8      -  Levofloxacin: R >4      -  Meropenem: R >8      -  Piperacillin/Tazobactam: R >64      -  Tobramycin: R >8        RADIOLOGY & ADDITIONAL TESTS: Reviewed. Detail Level: Simple Price (Do Not Change): 0.00 Instructions: This plan will send the code FBSD to the PM system.  DO NOT or CHANGE the price.

## 2023-11-21 NOTE — PROGRESS NOTE ADULT - ATTENDING COMMENTS
73 year old female with very complex medical history as above. Active issues is dialysis access. Has hx of right IJ venous occlusion, left HD catheter was removed over weekend due to malpositioning and poor blood flow. Not in need of urgent HD today. Will likely need placement of HD catheter this week. Follow up with IR. Still requires PO and IV vasopressor support during dialysis sessions. CMP reviewed. Leukocytosis increased today. Continue to monitor. Monitoring off antibiotics. ID input appreciated. Overall prognosis is very poor.

## 2023-11-21 NOTE — PROGRESS NOTE ADULT - SUBJECTIVE AND OBJECTIVE BOX
NEPHROLOGY     Patient seen and examined with daughter at bedside, trach to vent, plans for permacath placement with IR today noted.     MEDICATIONS  (STANDING):  albuterol/ipratropium for Nebulization 3 milliLiter(s) Nebulizer every 6 hours  artificial tears (preservative free) Ophthalmic Solution 1 Drop(s) Both EYES every 4 hours  aspirin  chewable 81 milliGRAM(s) Enteral Tube daily  atorvastatin 10 milliGRAM(s) Oral at bedtime  calamine/zinc oxide Lotion 1 Application(s) Topical daily  chlorhexidine 0.12% Liquid 15 milliLiter(s) Oral Mucosa every 12 hours  chlorhexidine 2% Cloths 1 Application(s) Topical daily  chlorhexidine 4% Liquid 1 Application(s) Topical <User Schedule>  dextrose 5%. 1000 milliLiter(s) (50 mL/Hr) IV Continuous <Continuous>  dextrose 5%. 1000 milliLiter(s) (100 mL/Hr) IV Continuous <Continuous>  dextrose 50% Injectable 12.5 Gram(s) IV Push once  dextrose 50% Injectable 25 Gram(s) IV Push once  dextrose 50% Injectable 25 Gram(s) IV Push once  dextrose 50% Injectable 25 Gram(s) IV Push once  droxidopa 600 milliGRAM(s) Oral every 8 hours  epoetin odalis (EPOGEN) Injectable 28936 Unit(s) IV Push <User Schedule>  ferrous    sulfate Liquid 300 milliGRAM(s) Oral daily  glucagon  Injectable 1 milliGRAM(s) IntraMuscular once  insulin lispro (ADMELOG) corrective regimen sliding scale   SubCutaneous every 6 hours  insulin NPH human recombinant 6 Unit(s) SubCutaneous every 6 hours  midodrine. 40 milliGRAM(s) Oral <User Schedule>  pantoprazole  Injectable 40 milliGRAM(s) IV Push two times a day  petrolatum Ophthalmic Ointment 1 Application(s) Both EYES four times a day  sodium chloride 1 Gram(s) Oral two times a day  sodium chloride 3%  Inhalation 4 milliLiter(s) Inhalation every 6 hours    VITALS:  T(C): , Max: 37.3 (11-20-23 @ 12:00)  T(F): , Max: 99.2 (11-20-23 @ 12:00)  HR: 114 (11-21-23 @ 07:00)  BP: --  RR: 24 (11-21-23 @ 07:00)  SpO2: 99% (11-21-23 @ 07:00)    PHYSICAL EXAM:  Constitutional: critically ill, trach to vent   HEENT: + NGT, + tracheostomy   Respiratory: coarse BS  Cardiovascular: tachy s1s2  Gastrointestinal: BS+, soft, NT/ND  Extremities: ++ generalized edema  : No Wheeler  Skin: No rashes  Access: none    LABS:                        9.4    21.64 )-----------( 486      ( 21 Nov 2023 04:39 )             33.1     11-21    132<L>  |  98  |  46<H>  ----------------------------<  124<H>  4.4   |  26  |  2.04<H>    Ca    9.2      21 Nov 2023 04:39  Phos  4.9     11-21  Mg     2.40     11-21    TPro  6.6  /  Alb  2.1<L>  /  TBili  <0.2  /  DBili  x   /  AST  28  /  ALT  20  /  AlkPhos  409<H>  11-21

## 2023-11-21 NOTE — PROGRESS NOTE ADULT - ASSESSMENT
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 Last dialyzed Friday     (2)CV- tenuous hemodynamics - intermittently requiring pressor gtt/on q6h Midodrine     (3)Pulm- trach/vent-dependent    (4)Anemia- on Epogen with HD    (5)GOC - s/p repeated discussions with family regarding GOC - family persisting with interest in aggressive measures.     RECOMMEND:  (1)Will plan for HD today after permacath placement     D/w Dr. Colon and HD MAXX Espinoza DNP  [facility]  [phone]        IMPRESSION: 73F w/ HTN, DM2, CVA, breast CA-bilateral mastectomy, recurrent aspiration pneumonia/respiratory failure, and CKD, 8/11/23 p/w acute hypercapnic respiratory failure; c/b RUSS    (1)Renal - RUSS on CKD4 ==>newly ESRD-HD as of this admission. Last dialyzed Friday     (2)CV- tenuous hemodynamics - intermittently requiring pressor gtt/on q6h Midodrine     (3)Pulm- trach/vent-dependent    (4)Anemia- on Epogen with HD    (5)GOC - s/p repeated discussions with family regarding GOC - family persisting with interest in aggressive measures.     RECOMMEND:  (1)Will plan for HD today after permacath placement     D/w Dr. Colon and HD CN     Nilda Espinoza DNP  [facility]  [phone]       RENAL ATTENDING NOTE  Patient seen and examined with NP. Agree with assessment and plan as above.    Jimmy Colon MD  [facility]  [phone]

## 2023-11-21 NOTE — PROGRESS NOTE ADULT - ASSESSMENT
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       NEUROLOGY  #AMS  # Multiple embolic strokes   # L PCA Infarct   MR head performed w/ new infarct and old infarcts, EEG without seizure events but with high foci potential   - EEG given facial twitching: negative for epileptiform activity (10/31)  - deferring repeat MRI as unlikely to   - baseline  AOX3 at home per daughter, currently spontaneously opening eyes, localized to voice and pain, trached/non-verbal   - Keppra stopped 11/10 (was on as seizure ppx given multiple infarcts, no seizures on multiple EEG, stopped to ensure it is not contributing to sedation), the same mental status, -- opens eyes spontaneously, and shakes her head intermittently, not following commands    - Hgb stable not requiring PRBC since 11/4, ASA 81 mg restarted on 11/14  - continue Lipitor    CARDIOVASCULAR  # Septic vs vasoplegic shock   Etiology likely septic iso active infection. Possible component of vasoplegic, however no longer with active infection or on sedation. Off IV vasopressors.   Current regimen:   - droxidopa 600mg q8h with PRN 600mg ordered preHD  - midodrine 40mg q6 h; trial of additional 40mg prior to HD  - required levophed up to 0.14 during HD 11/15  weaned off after dialysis session, will need additional midodrine/droxidopa dose preHD    # HFpEF w/ decompensation   > ECHO w/ EF 59 with severe LVH and diastolic dysfunction   - fluid overloaded, HD to remove fluids     HEENT   # Left ear OE with acute on chronic left-sided otomastoiditis, s/p Abx (see in ID)   - noted to have white discharged/residue, increased from prior per patient's daughter; ENT reconsulted, resumed on cipro drops (10/31 - 11/7),   # 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   - resolved    RESPIRATORY  # acute hypoxic resp failure second to volume overload, ventilator dependence   # Copious inline and oral thick tanned secretions   - CKD vs HFpEF w/ hypercarbia 2/2 volume overload requiring intubation, trach, and HD initiation  - Continue on albuterol and chest PT, cont 3 % INH, IPV  - Continue volume removal with HD  - hypoxemia w/ turning and repositioning, CXR 11/16 shows moderate right pleural effusion, worsening RUL atelectasis, left has small effusion and/or atelectasis. Plan for flexible bronchoscopy and possible thoracentesis  - CXR 11/16 shows moderate right pleural effusion with suggestion of worsening RUL atelectasis, small left effusion and/or atelectasis.  - Bronchoscopy bedside 11/16 noted with white secretions right > left  - f/u BAL     #tracheomalacia   - CT CHEST (9/8) with tracheomalacia, BL pleural effusions and persistent consolidations     GI  # GIB: last pRBC transfused 11/4 (10/14 before that)   - Continue on PPI BID  - patient had melena before 11/13 but H/H stable,  Eliquis was held for dark brown/black stool  - as per nursing staff, stool is black in the rectal system    # Dysphagia   - NGT- tolerating feeds, at goal- changed to nepro was per nutrition    RENAL  # ESRD, L ARTHUR taylor   - HD MWF TIW with midodrine and droxidopa   - ICU for vasopressor assisted volume removal, cap to 1 pressor and not offering CRRT due to the comorbidities and prognosis   - f/u w/ nephrology: will continue to offer patient 1-pressor assisted HD as agreed prior, as long as she can tolerate HD maxed on 1 pressor, Levo to 0.3; she will be considered iHD candidate  - IR was planning PermCath 11/14; but procedure was cancelled due to pt in MICU, on vasopressors, and has leucocytosis  - [facility] ilene discontinued 11/16 for line holiday, trialysis catheter placed in [facility] on 11/17 however was removed 11/18 iso poor flow rates during HD.  - IR re-consulted for permacath on 11/19- is requiring ID to clear patient given WBC  - ID recs appreciated. Per ID, risk of infection with shiley is higher than with a permcath.  Decision re placement of permcath is a risk benefit decision. Pt may continue to have leukocytosis in setting of clinical condition. Lower suspicion for infection at this time.   - IR reconsulted regarding permacath     INFECTIOUS DISEASE  #Septic shock  , afebrile,  WBC - 15>27, LA wnls  #blood cx 9/1: MSSA with hypotension  repeat negative 9/4, 9/8 s/p 4 weeks of vanco/dapto through 10/2  #Colonized with  pseudomonas   # MSSA/ESBL E Coli PNA  # L otitis Externa s/p ENT debridement c/b Candida, s/p Meropenem and Fluconazole  # Proteus/ Pseudomonas in sputum, s/p Aztreonam   - recent Bcx 11/2 negative, sputum 11/2 with  pseudomonas- colonized   - repeat MRSA PCR neg  - afebrile now after completing course aztreonam 11/6  - monitoring off abx   - f/u BAL (11/16)- only afb was sent which is negative.     # possible malignant otitis externa   # ESBL ECOLI and MSSA VAP c/b MSSA bacteremia   - hx of MSSA bacteremia, ESBL E. Coli sputum Cx, BAL w/ MSSA  - treated with abx   - eosinophilia workup: JORGE, ANCAs negative    HEME  # Anemia second to GIB vs renal disease   - multiple transfusions, last done 11/4  - Eliquis held 2/2 pt having melena, and now pt with black stools/dark brown  - 11/14  restarted ASA 81 mg   - restarted Heparin SQ prophylaxis 11/14 today, will continue to hold off starting ac given melena.    #Allergic transfusion reaction: per RCU documentation, developed erythematous rash across chest shortly after starting transfusion, blood bank consulted and diagnosed mild allergic rxn  - premedicate w/ benadryl and famotidine prior to future transfusions  - no issue w/ pRBC transfusion 11/4    VASCULAR   # LLE POP DVT   - on Eliquis (held as of 11/11 PM for procedure and also patient having melena), stools are black/dark brown  - restarted Heparin SQ prophylaxis 11/14 and will continue to hold off starting Eliquis given melena.  - SCDs    # HD access  - TRISHA TAYLOR (9/27 - 10/21), replaced 10/24 -11/15  - [facility] lilifabi discontinued 11/16 for line holiday, trialysis catheter placed in [facility] on 11/17 however was removed 11/18 iso poor flow rates during HD.  - Permacath was planned on 11/14, but cancelled 2/2 pt in MICU, requiring pressors, and + leukocytosis   - IR re-consulted for permacath on 11/19- is requiring ID to clear patient given WBC    ONC   # Hx of Breast CA   - Patient dx in 2018 and s/p B/L mastectomy (radical on right) and chemo and RT.     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       NEUROLOGY  #AMS  # Multiple embolic strokes   # L PCA Infarct   MR head performed w/ new infarct and old infarcts, EEG without seizure events but with high foci potential   - EEG given facial twitching: negative for epileptiform activity (10/31)  - deferring repeat MRI as unlikely to   - baseline  AOX3 at home per daughter, currently spontaneously opening eyes, localized to voice and pain, trached/non-verbal   - Keppra stopped 11/10 (was on as seizure ppx given multiple infarcts, no seizures on multiple EEG, stopped to ensure it is not contributing to sedation), the same mental status, -- opens eyes spontaneously, and shakes her head intermittently, not following commands    - Hgb stable not requiring PRBC since 11/4, ASA 81 mg restarted on 11/14  - continue Lipitor    CARDIOVASCULAR  # Septic vs vasoplegic shock   Etiology likely septic iso active infection. Possible component of vasoplegic, however no longer with active infection or on sedation. Off IV vasopressors.   Current regimen:   - droxidopa 600mg q8h with PRN 600mg ordered preHD  - midodrine 40mg q6 h; trial of additional 40mg prior to HD  - required levophed up to 0.14 during HD 11/15  weaned off after dialysis session, will need additional midodrine/droxidopa dose preHD  - MAPs mid 50s on 11/21 improved with levo 0.05    # HFpEF w/ decompensation   > ECHO w/ EF 59 with severe LVH and diastolic dysfunction   - fluid overloaded, HD to remove fluids     HEENT   # Left ear OE with acute on chronic left-sided otomastoiditis, s/p Abx (see in ID)   - noted to have white discharged/residue, increased from prior per patient's daughter; ENT reconsulted, resumed on cipro drops (10/31 - 11/7),   # 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   - resolved    RESPIRATORY  # acute hypoxic resp failure second to volume overload, ventilator dependence   # Copious inline and oral thick tanned secretions   - CKD vs HFpEF w/ hypercarbia 2/2 volume overload requiring intubation, trach, and HD initiation  - Continue on albuterol and chest PT, cont 3 % INH, IPV  - Continue volume removal with HD  - hypoxemia w/ turning and repositioning, CXR 11/16 shows moderate right pleural effusion, worsening RUL atelectasis, left has small effusion and/or atelectasis. Plan for flexible bronchoscopy and possible thoracentesis  - CXR 11/16 shows moderate right pleural effusion with suggestion of worsening RUL atelectasis, small left effusion and/or atelectasis.  - Bronchoscopy bedside 11/16 noted with white secretions right > left  - f/u BAL     #tracheomalacia   - CT CHEST (9/8) with tracheomalacia, BL pleural effusions and persistent consolidations     GI  # GIB: last pRBC transfused 11/4 (10/14 before that)   - Continue on PPI BID  - patient had melena before 11/13 but H/H stable,  Eliquis was held for dark brown/black stool  - as per nursing staff, stool is black in the rectal system    # Dysphagia   - NGT- tolerating feeds, at goal- changed to nepro was per nutrition    RENAL  # ESRD, L ARTHUR odom   - HD MWF TIW with midodrine and droxidopa   - ICU for vasopressor assisted volume removal, cap to 1 pressor and not offering CRRT due to the comorbidities and prognosis   - f/u w/ nephrology: will continue to offer patient 1-pressor assisted HD as agreed prior, as long as she can tolerate HD maxed on 1 pressor, Levo to 0.3; she will be considered iHD candidate  - IR was planning PermCath 11/14; but procedure was cancelled due to pt in MICU, on vasopressors, and has leucocytosis  - [facility] ilene discontinued 11/16 for line holiday, trialysis catheter placed in [facility] on 11/17 however was removed 11/18 iso poor flow rates during HD.  - IR re-consulted for permacath on 11/19- is requiring ID to clear patient given WBC  - ID recs appreciated. Per ID, risk of infection with shiley is higher than with a permcath.  Decision re placement of permcath is a risk benefit decision. Pt may continue to have leukocytosis in setting of clinical condition. Lower suspicion for infection at this time.   - IR permacath placement planned for 11/21     INFECTIOUS DISEASE  #Septic shock  , afebrile,  WBC - 15>27, LA wnls  #blood cx 9/1: MSSA with hypotension  repeat negative 9/4, 9/8 s/p 4 weeks of vanco/dapto through 10/2  #Colonized with  pseudomonas   # MSSA/ESBL E Coli PNA  # L otitis Externa s/p ENT debridement c/b Candida, s/p Meropenem and Fluconazole  # Proteus/ Pseudomonas in sputum, s/p Aztreonam   - recent Bcx 11/2 negative, sputum 11/2 with  pseudomonas- colonized   - repeat MRSA PCR neg  - afebrile now after completing course aztreonam 11/6  - monitoring off abx   - f/u BAL (11/16)- only afb was sent which is negative.     # possible malignant otitis externa   # ESBL ECOLI and MSSA VAP c/b MSSA bacteremia   - hx of MSSA bacteremia, ESBL E. Coli sputum Cx, BAL w/ MSSA  - treated with abx   - eosinophilia workup: JORGE, ANCAs negative    HEME  # Anemia second to GIB vs renal disease   - multiple transfusions, last done 11/4  - Eliquis held 2/2 pt having melena, and now pt with black stools/dark brown  - 11/14  restarted ASA 81 mg   - restarted Heparin SQ prophylaxis 11/14 today, will continue to hold off starting ac given melena.    #Allergic transfusion reaction: per RCU documentation, developed erythematous rash across chest shortly after starting transfusion, blood bank consulted and diagnosed mild allergic rxn  - premedicate w/ benadryl and famotidine prior to future transfusions  - no issue w/ pRBC transfusion 11/4    VASCULAR   # LLE POP DVT   - on Eliquis (held as of 11/11 PM for procedure and also patient having melena), stools are black/dark brown  - restarted Heparin SQ prophylaxis 11/14 and will continue to hold off starting Eliquis given melena.  - SCDs    # HD access  - TR[facility] ILENE (9/27 - 10/21), replaced 10/24 -11/15  - TR[facility] odom discontinued 11/16 for line holiday, trialysis catheter placed in [facility] on 11/17 however was removed 11/18 iso poor flow rates during HD.  - IR permacath placement planned for 11/21    ONC   # Hx of Breast CA   - Patient dx in 2018 and s/p B/L mastectomy (radical on right) and chemo and RT.     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       NEUROLOGY  #AMS  # Multiple embolic strokes   # L PCA Infarct   MR head performed w/ new infarct and old infarcts, EEG without seizure events but with high foci potential   - EEG given facial twitching: negative for epileptiform activity (10/31)  - deferring repeat MRI as unlikely to   - baseline  AOX3 at home per daughter, currently spontaneously opening eyes, localized to voice and pain, trached/non-verbal   - Keppra stopped 11/10 (was on as seizure ppx given multiple infarcts, no seizures on multiple EEG, stopped to ensure it is not contributing to sedation), the same mental status, -- opens eyes spontaneously, and shakes her head intermittently, not following commands    - Hgb stable not requiring PRBC since 11/4, ASA 81 mg restarted on 11/14  - continue Lipitor    CARDIOVASCULAR  # Septic vs vasoplegic shock   Etiology likely septic iso active infection. Possible component of vasoplegic, however no longer with active infection or on sedation. Off IV vasopressors.   Current regimen:   - droxidopa 600mg q8h with PRN 600mg ordered preHD  - midodrine 40mg q6 h; trial of additional 40mg prior to HD  - required levophed up to 0.14 during HD 11/15  weaned off after dialysis session, will need additional midodrine/droxidopa dose preHD  - MAPs mid 50s on 11/21 improved with levo 0.05    # HFpEF w/ decompensation   > ECHO w/ EF 59 with severe LVH and diastolic dysfunction   - fluid overloaded, HD to remove fluids     HEENT   # Left ear OE with acute on chronic left-sided otomastoiditis, s/p Abx (see in ID)   - noted to have white discharged/residue, increased from prior per patient's daughter; ENT reconsulted, resumed on cipro drops (10/31 - 11/7),   # 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   - resolved    RESPIRATORY  # acute hypoxic resp failure second to volume overload, ventilator dependence   # Copious inline and oral thick tanned secretions   - CKD vs HFpEF w/ hypercarbia 2/2 volume overload requiring intubation, trach, and HD initiation  - Continue on albuterol and chest PT, cont 3 % INH, IPV  - Continue volume removal with HD  - hypoxemia w/ turning and repositioning, CXR 11/16 shows moderate right pleural effusion, worsening RUL atelectasis, left has small effusion and/or atelectasis. Plan for flexible bronchoscopy and possible thoracentesis  - CXR 11/16 shows moderate right pleural effusion with suggestion of worsening RUL atelectasis, small left effusion and/or atelectasis.  - Bronchoscopy bedside 11/16 noted with white secretions right > left  - f/u BAL     #tracheomalacia   - CT CHEST (9/8) with tracheomalacia, BL pleural effusions and persistent consolidations     GI  # GIB: last pRBC transfused 11/4 (10/14 before that)   - Continue on PPI BID  - patient had melena before 11/13 but H/H stable,  Eliquis was held for dark brown/black stool  - as per nursing staff, stool is black in the rectal system    # Dysphagia   - NGT- tolerating feeds, at goal- changed to nepro was per nutrition    RENAL  # ESRD, L ARTHUR taylor   - HD MWF TIW with midodrine and droxidopa   - ICU for vasopressor assisted volume removal, cap to 1 pressor and not offering CRRT due to the comorbidities and prognosis   - f/u w/ nephrology: will continue to offer patient 1-pressor assisted HD as agreed prior, as long as she can tolerate HD maxed on 1 pressor, Levo to 0.3; she will be considered iHD candidate  - IR was planning PermCath 11/14; but procedure was cancelled due to pt in MICU, on vasopressors, and has leucocytosis  - [facility] ilene discontinued 11/16 for line holiday, trialysis catheter placed in [facility] on 11/17 however was removed 11/18 iso poor flow rates during HD.  - IR re-consulted for permacath on 11/19- is requiring ID to clear patient given WBC  - ID recs appreciated. Per ID, risk of infection with shiley is higher than with a permcath.  Decision re placement of permcath is a risk benefit decision. Pt may continue to have leukocytosis in setting of clinical condition. Lower suspicion for infection at this time.   - IR permacath placement planned for 11/21, may have HD after    INFECTIOUS DISEASE  #Septic shock  , afebrile,  WBC - 15>27, LA wnls  #blood cx 9/1: MSSA with hypotension  repeat negative 9/4, 9/8 s/p 4 weeks of vanco/dapto through 10/2  #Colonized with  pseudomonas   # MSSA/ESBL E Coli PNA  # L otitis Externa s/p ENT debridement c/b Candida, s/p Meropenem and Fluconazole  # Proteus/ Pseudomonas in sputum, s/p Aztreonam   - recent Bcx 11/2 negative, sputum 11/2 with  pseudomonas- colonized   - repeat MRSA PCR neg  - afebrile now after completing course aztreonam 11/6  - monitoring off abx   - f/u BAL (11/16)- only afb was sent which is negative.     # possible malignant otitis externa   # ESBL ECOLI and MSSA VAP c/b MSSA bacteremia   - hx of MSSA bacteremia, ESBL E. Coli sputum Cx, BAL w/ MSSA  - treated with abx   - eosinophilia workup: JORGE, ANCAs negative    HEME  # Anemia second to GIB vs renal disease   - multiple transfusions, last done 11/4  - Eliquis held 2/2 pt having melena, and now pt with black stools/dark brown  - 11/14  restarted ASA 81 mg   - restarted Heparin SQ prophylaxis 11/14 today, will continue to hold off starting ac given melena.    #Allergic transfusion reaction: per RCU documentation, developed erythematous rash across chest shortly after starting transfusion, blood bank consulted and diagnosed mild allergic rxn  - premedicate w/ benadryl and famotidine prior to future transfusions  - no issue w/ pRBC transfusion 11/4    VASCULAR   # LLE POP DVT   - on Eliquis (held as of 11/11 PM for procedure and also patient having melena), stools are black/dark brown  - restarted Heparin SQ prophylaxis 11/14 and will continue to hold off starting Eliquis given melena.  - SCDs    # HD access  - [facility] TAYLOR (9/27 - 10/21), replaced 10/24 -11/15  - [facility] discontinued 11/16 for line holiday, trialysis catheter placed in [facility] on 11/17 however was removed 11/18 iso poor flow rates during HD.  - IR permacath placement planned for 11/21    ONC   # Hx of Breast CA   - Patient dx in 2018 and s/p B/L mastectomy (radical on right) and chemo and RT.     ENDOCRINE  # IDDM2   - Continue on NPH 6U with moderate ISS   - Adjust as needed      ETHICS/ GOC    - Attempted palliative discussion however family not interested and wishes for FULL CODE  - Family continues to want everything done despite inability to tolerate HD on multiple oral pressor  - Levo capped at 0.3 during HD

## 2023-11-21 NOTE — PROVIDER CONTACT NOTE (OTHER) - ACTION/TREATMENT ORDERED:
At 2035 hold on bladder scanning, monitor urine output. If no other urine output then bladder scan 6-8 hours later
Oral Tylenol
Wound consult to be scheduled
Administer 0.5 unit of RBC over 2 hours then 0.5 unit of RBC over 2 hours
IV tylenol ordered.
Dialysis RN to change dressing, apply surgicel and monitor site. HD to continue
Patient assessed by nursing and clinical staff. Restraints determined to not be clinically appropriate at this time.
MICU team to come to bedside within the hour to place a #20 gauge in the Left lower arm with blood return. As per team it is okay to run the levophed through the accucath until they come.
Administered midodrine early then recheck BP approximately 1 hour after - 92/34 (ACP notified)
Benadryl and Tylenol administered via NG tube.  Blood cultures and vbg ordered.  Zosyn discontinued as per ACP.
Provider notified/ no change
provider aware, blood bank notified
Provider made aware and ordered for benadryl and calamine lotion PRN for rash
Primary Provider notified. RN for patient notified.

## 2023-11-21 NOTE — PROVIDER CONTACT NOTE (OTHER) - NAME OF MD/NP/PA/DO NOTIFIED:
BETTINA Hylton
Jacinda Lopez
Clayton DUNBAR
Fredy DUNBAR/ jono Marino NP, Kimo DUNBAR
Matilda Tripathi
Dr Jimmy Colon
Jacinda DUNBAR
Jacinda Shelby, PA
Niesha Rowan
Rudi Arnold
Jacinda Shelby
Yasir Muñoz
CRISTIANE Joe
Matilda Tripathi

## 2023-11-21 NOTE — PROVIDER CONTACT NOTE (OTHER) - REASON
Decreased blood pressure
Unable to complete Hemodialysis treatment due to IJ catheter malfunction
Blood pressure MAP decreasing, need for another access
Left ear wound
Low Hemoglobin x2 and hematocrit
Urine output; initially anuric
Bleeding at IJ site (hemodialysis)
Patient family member concern
Pt rectal temp 101F
Temp of 101.5
aPTT 63.1
redness noted on anterior chest/ trunk, F 100.4
Generalized Rash
Temp 101.5, patient noted to have facial flushing, radiating to mid-torso

## 2023-11-21 NOTE — PROVIDER CONTACT NOTE (OTHER) - SITUATION
Axillary temp of 101.5
Temp 101.5, patient noted to have facial flushing, radiating to mid-torso,
Upon assessment Pt presents with blotchy rash covering the body
redness noted on anterior chest/ trunk, F 100.4
Critical value of low hemoglobin and hematocrit post- dialysis of 7.0 and 21.0 respectively at 2111. ACP notified and informed to monitor since trend has occurred before. Hemoglobin 6.8 at 2200
Notified by previous RN that patient was anuric from night before; Order was to bladder scan overnight. Now making urine -primafit to suction
Hemodialysis was initiated after being stopped for days. Dressing saturated with active bleeding.
Patient catheter with elevated arterial pressure. Unable to continue HD treatment.
Patient has a left ear wound, denuded inner ear with small clear drainage.
Patient orally intubated and sedated on precedex gtt to a RASS 0 to -1. Patient's daughter at bedside and requested to place patient in restraints.
Patient's BP has been consistently low with baseline being low. Heparin and bumex running continuously. BP was 85/33. ACP ran series of BP - still decreased
Pt rectal temp 101F
Pt is starting to stay hypotensive with MAPS at 58 consistently. Pt is requiring pressor support but Left upper arm Accucath has no blood return and slight resistance flushing.
aPTT 63.1

## 2023-11-21 NOTE — PROGRESS NOTE ADULT - ASSESSMENT
74 y/o F well known to me from my [facility] outpt practice. she was admitted at [facility] 7/12-7/22 w aspiration PNA, was treated w CEFEPIME, developed an allergic rash,  dCHF, + MAC on AFB culture, had been progressively getting more and more lethargic and dyspneic at home since DC.   In  am of 8/11/23  ptn presented with respiratory distress w hypoxia and hypercarbia requiring intubation 2/2 volume overload +/- Asp PNA      Neuro   not responsive  Baseline MS AOx3, aphasic   - h/o CVA , on aspirin and statin . resumed w feeding tube, ASA resumed 8/26  - eeg  2/2 tremors, no sz focus, right facial twitching, EEG has been negative. 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 probably embolic in nature, did not tolerate full AC in the past, STEPHANIE is neg , no shunts observed  - ptn is poorly reactive, rpt scan done, no further CVA seen.     Cardiac   cardiology following  CHFpEF   TTE 7/2023 with EF 59%, with severe LVH and diastolic dysfunction   off Levo drip , now only during HD  on midodrine 40 mg qid, droxidopa 600 tid, additional 200 mg prior to HD,     Pulmonary   Acute hypercapnic and hypoxemic respiratory failure   prolonged intubation, now  trache to  vent, has copious secretions      Renal   on HD via L IJ Shiley, tunneled catheter when clinically stable  HD MWF, midodrine and pressor assisted,   permacath  scheduled to be placed in IR. cleared for PAC placement by ID.  presently no HD access  L IJ shiley is out      GI  NPO  on tube feeds  UGI bleed 8/31,  EGD/Colonoscopy: erosive gastropathy, esophagitis on colonoscopy old blood seen no active bleeding, poor prep  NGT-TF, Nepro  s/p 2UPRBC 11/4    Endocrine  on Insulin: NPH, Admelog    ID   complicated infectious hospital course  treated for MSSA bacteremia, aspiration PNA.  sputum + for pseudomonas, ptn was initially on zosyn but was allergic, then was switched to aztreonam   Aztreonam was switched to polymyxin for a possible allergy but ptn didnt have a reaction to aztreonam, she had a reaction to Zosyn.   spike temps again while off Abx, Aztreonam resumed 10/17, DCed 10/29  tmax 104.6 on 11/2-11/5, Cx NTD  leukocytosis is down today to 19 from 27K on 11/17. presumed this is 2/2 BAL via bronch on 11/16. afebrile  cont observing off ABx. ID follow up reviewed . 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 externa resolved    Heme/Onc  on eliquis for  LEFT POPLITEAL VEIN ACUTE DVT , on ELiquis    Ethics  GOC - Discussed GOC with daughter and , they have opted in the past for full code. and she remains full code at present

## 2023-11-22 NOTE — PROGRESS NOTE ADULT - ASSESSMENT
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 While in MICU patient noted with worsening renal failure requiring HD initiation, CGE/ Melena s/p EGD 8/31 found with esophagitis and erosive gastropathy, new right cerebellar infarct and fevers second to ESBL COLI and MSSA VAP, MSSA bacteremia, left ear otitis externa and drug rxn to cephalosporins Course further complicated by prolonged vent time s/p tracheostomy 9/1 and transferred to RCU 9/3 completed by recurrent fevers with high PIP, respiratory acidosis and concern for volume overloaded.   CTH repeated 9/26 for concern for encephalopathy - has known now - chronic bilateral cerebellar infarcts, L PCA infarct. L parietal hypodensity seen on prior CT likely artifactual  Repeat CTH 10/3 - unchanged  EEG 10/5 with L posterocentral/temporal spikes/sharp waves - doubt true focal seizure upon further review of EEG     Impression:   Suspect underlying movement d/o - PD?  Metabolic encephalopathy related to shock, infection, bihemispheric infarcts  Multiple embolic strokes s/p ILR without events  Dementia   MSSA bacteremia, s/p Daptomycin  recurrent L otitis externa  Acute popliteal DVT on Eliquis   Anemia     Recommendations   [] care per MICU for persistent hypotension, sepsis  [] Abx per ID   [] has multiple areas of epileptogenic potential on EEG, no clear seizure correlate seen.  Repeat EEG for R facial twitching negative.   [] would stop Keppra to see if mental status improves, doubt true seizures -> now off with more eye openin  [] consider MRI brain once stable but doubt will change mgmt  [] mgmt of hypotension per primary team, remains full code  [] Abx per ID  [] C/w home Atorvastatin 10 mg qhs   [] continue to address GOC with family, poor prognosis    Katty Charles DO  Vascular Neurology  Office [phone]

## 2023-11-22 NOTE — PROGRESS NOTE ADULT - SUBJECTIVE AND OBJECTIVE BOX
Subjective: Patient seen and examined. No new events except as noted.   remains in ICU   rising WBC     REVIEW OF SYSTEMS:    Unable to obtain      MEDICATIONS:  MEDICATIONS  (STANDING):  albuterol/ipratropium for Nebulization 3 milliLiter(s) Nebulizer every 6 hours  artificial tears (preservative free) Ophthalmic Solution 1 Drop(s) Both EYES every 4 hours  atorvastatin 10 milliGRAM(s) Oral at bedtime  calamine/zinc oxide Lotion 1 Application(s) Topical daily  chlorhexidine 0.12% Liquid 15 milliLiter(s) Oral Mucosa every 12 hours  chlorhexidine 2% Cloths 1 Application(s) Topical daily  chlorhexidine 4% Liquid 1 Application(s) Topical <User Schedule>  dextrose 5%. 1000 milliLiter(s) (50 mL/Hr) IV Continuous <Continuous>  dextrose 5%. 1000 milliLiter(s) (100 mL/Hr) IV Continuous <Continuous>  dextrose 50% Injectable 12.5 Gram(s) IV Push once  dextrose 50% Injectable 25 Gram(s) IV Push once  dextrose 50% Injectable 25 Gram(s) IV Push once  dextrose 50% Injectable 25 Gram(s) IV Push once  droxidopa 600 milliGRAM(s) Oral every 8 hours  epoetin odalis (EPOGEN) Injectable 85185 Unit(s) IV Push <User Schedule>  ferrous    sulfate Liquid 300 milliGRAM(s) Oral daily  glucagon  Injectable 1 milliGRAM(s) IntraMuscular once  insulin lispro (ADMELOG) corrective regimen sliding scale   SubCutaneous every 6 hours  insulin NPH human recombinant 6 Unit(s) SubCutaneous every 6 hours  midodrine. 40 milliGRAM(s) Oral <User Schedule>  norepinephrine Infusion 0.05 MICROgram(s)/kG/Min (6.23 mL/Hr) IV Continuous <Continuous>  pantoprazole  Injectable 40 milliGRAM(s) IV Push two times a day  petrolatum Ophthalmic Ointment 1 Application(s) Both EYES four times a day  sodium chloride 1 Gram(s) Oral two times a day  sodium chloride 3%  Inhalation 4 milliLiter(s) Inhalation every 6 hours      PHYSICAL EXAM:  T(C): 36.6 (11-22-23 @ 08:00), Max: 37.6 (11-21-23 @ 12:00)  HR: 103 (11-22-23 @ 09:00) (89 - 110)  BP: --  RR: 13 (11-22-23 @ 06:00) (13 - 30)  SpO2: 96% (11-22-23 @ 07:32) (96% - 100%)  Wt(kg): --  I&O's Summary    21 Nov 2023 07:01  -  22 Nov 2023 07:00  --------------------------------------------------------  IN: 330.1 mL / OUT: 0 mL / NET: 330.1 mL            Appearance: NAD, +trach  +NGT  HEENT: dry oral mucosa  Lymphatic: No lymphadenopathy  Cardiovascular: Normal S1 S2, No JVD, No murmurs, No edema  Respiratory: Decreased BS, + trach to vent	  Neuro: opens eyes  Gastrointestinal: Soft, Non-tender, + BS, + NGT  Skin: No rashes, No ecchymoses, No cyanosis	  Extremities: No strength/ROM 2/2 sedation, + BL LE edema  Vascular: Peripheral pulses palpable 2+ bilaterally  Anasarca   Mode: AC/ CMV (Assist Control/ Continuous Mandatory Ventilation), RR (machine): 24, FiO2: 40, PEEP: 8, ITime: 0.8, MAP: 19, PC: 35, PIP: 38          LABS:    CARDIAC MARKERS:                                8.9    29.11 )-----------( 536      ( 22 Nov 2023 00:30 )             31.3     11-22    133<L>  |  98  |  53<H>  ----------------------------<  181<H>  4.8   |  25  |  2.28<H>    Ca    8.7      22 Nov 2023 00:30  Phos  5.4     11-22  Mg     2.50     11-22    TPro  5.9<L>  /  Alb  1.8<L>  /  TBili  <0.2  /  DBili  x   /  AST  38<H>  /  ALT  20  /  AlkPhos  382<H>  11-22    proBNP:   Lipid Profile:   HgA1c:   TSH:             TELEMETRY: 	    ECG:  	  RADIOLOGY:   DIAGNOSTIC TESTING:  [ ] Echocardiogram:  [ ]  Catheterization:  [ ] Stress Test:    OTHER:

## 2023-11-22 NOTE — PROCEDURE NOTE - NSICDXPROCEDURE_GEN_ALL_CORE_FT
PROCEDURES:  Bronchoscopy, adult 27-Sep-2023 16:26:53  Edith Lee  Replacement, tracheostomy tube 27-Sep-2023 16:27:37  Edith Lee  
PROCEDURES:  Insertion of vein to vein cannula for hemodialysis 22-Nov-2023 15:51:51  Ziyad Khalil  
PROCEDURES:  Replacement of feeding tube 27-Sep-2023 16:28:30  Eidth Lee  
PROCEDURES:  Insertion of temporary dialysis catheter 27-Sep-2023 16:29:34  Edith Lee

## 2023-11-22 NOTE — PROGRESS NOTE ADULT - SUBJECTIVE AND OBJECTIVE BOX
INTERVAL HPI/OVERNIGHT EVENTS:    SUBJECTIVE: Patient seen and examined at bedside. No acute overnight events. Mentation unchanged.     ROS: All negative except as listed above.    VITAL SIGNS:  ICU Vital Signs Last 24 Hrs  T(C): 36.6 (22 Nov 2023 08:00), Max: 37.6 (21 Nov 2023 12:00)  T(F): 97.9 (22 Nov 2023 08:00), Max: 99.6 (21 Nov 2023 12:00)  HR: 103 (22 Nov 2023 10:56) (89 - 110)  BP: --  BP(mean): --  ABP: 117/33 (22 Nov 2023 10:00) (104/32 - 133/45)  ABP(mean): 66 (22 Nov 2023 10:00) (59 - 80)  RR: 24 (22 Nov 2023 10:00) (13 - 30)  SpO2: 100% (22 Nov 2023 10:56) (96% - 100%)    O2 Parameters below as of 22 Nov 2023 10:56  Patient On (Oxygen Delivery Method): ventilator          Mode: AC/ CMV (Assist Control/ Continuous Mandatory Ventilation), RR (machine): 24, FiO2: 40, PEEP: 8, ITime: 0.8, MAP: 19, PC: 35, PIP: 43  Plateau pressure:   P/F ratio:     11-21 @ 07:01  -  11-22 @ 07:00  --------------------------------------------------------  IN: 330.1 mL / OUT: 0 mL / NET: 330.1 mL    11-22 @ 07:01  -  11-22 @ 11:20  --------------------------------------------------------  IN: 15 mL / OUT: 0 mL / NET: 15 mL      CAPILLARY BLOOD GLUCOSE      POCT Blood Glucose.: 122 mg/dL (22 Nov 2023 11:13)      ECG: reviewed.    PHYSICAL EXAM:    CONSTITUTIONAL: Trached, critically ill    RESPIRATORY: +vent and coarse breath sounds auscultated  CARDIOVASCULAR: Tachycardic and regular rhythm, normal S1 and S2, no murmur/rub/gallop;  Peripheral pulses are 2+ bilaterally  ABDOMEN: +NGT. Nontender to palpation, normoactive bowel sounds, no rebound/guarding; No hepatosplenomegaly. Rectal tube w/ black output  MSK: no clubbing or cyanosis of digits; no joint swelling or tenderness to palpation  Extremities: Anasarca. 3+ BLE edema to knees, 1+ edema of bilateral thighs. 3+ edema of bilateral forearms  Neuro: Does not respond to verbal or physical stimuli. Does not follow commands    MEDICATIONS:  MEDICATIONS  (STANDING):  albuterol/ipratropium for Nebulization 3 milliLiter(s) Nebulizer every 6 hours  artificial tears (preservative free) Ophthalmic Solution 1 Drop(s) Both EYES every 4 hours  aspirin  chewable 81 milliGRAM(s) Enteral Tube daily  atorvastatin 10 milliGRAM(s) Oral at bedtime  calamine/zinc oxide Lotion 1 Application(s) Topical daily  chlorhexidine 0.12% Liquid 15 milliLiter(s) Oral Mucosa every 12 hours  chlorhexidine 2% Cloths 1 Application(s) Topical daily  chlorhexidine 4% Liquid 1 Application(s) Topical <User Schedule>  dextrose 5%. 1000 milliLiter(s) (50 mL/Hr) IV Continuous <Continuous>  dextrose 5%. 1000 milliLiter(s) (100 mL/Hr) IV Continuous <Continuous>  dextrose 50% Injectable 12.5 Gram(s) IV Push once  dextrose 50% Injectable 25 Gram(s) IV Push once  dextrose 50% Injectable 25 Gram(s) IV Push once  dextrose 50% Injectable 25 Gram(s) IV Push once  droxidopa 600 milliGRAM(s) Oral every 8 hours  epoetin odalis (EPOGEN) Injectable 69480 Unit(s) IV Push <User Schedule>  ferrous    sulfate Liquid 300 milliGRAM(s) Oral daily  glucagon  Injectable 1 milliGRAM(s) IntraMuscular once  heparin   Injectable 5000 Unit(s) SubCutaneous every 8 hours  insulin lispro (ADMELOG) corrective regimen sliding scale   SubCutaneous every 6 hours  insulin NPH human recombinant 6 Unit(s) SubCutaneous every 6 hours  midodrine. 40 milliGRAM(s) Oral <User Schedule>  norepinephrine Infusion 0.05 MICROgram(s)/kG/Min (6.23 mL/Hr) IV Continuous <Continuous>  pantoprazole  Injectable 40 milliGRAM(s) IV Push two times a day  petrolatum Ophthalmic Ointment 1 Application(s) Both EYES four times a day  sodium chloride 1 Gram(s) Oral two times a day  sodium chloride 3%  Inhalation 4 milliLiter(s) Inhalation every 6 hours    MEDICATIONS  (PRN):  acetaminophen   Oral Liquid .. 650 milliGRAM(s) Oral every 6 hours PRN Temp greater or equal to 38C (100.4F), Mild Pain (1 - 3)  dextrose Oral Gel 15 Gram(s) Oral once PRN Blood Glucose LESS THAN 70 milliGRAM(s)/deciliter  diphenhydrAMINE Elixir 50 milliGRAM(s) Oral once PRN Rash and/or Itching  droxidopa 600 milliGRAM(s) Oral <User Schedule> PRN prior to hemodialysis  midodrine. 40 milliGRAM(s) Oral once PRN 1 Hour Pre HD  sodium chloride 0.9% Bolus. 250 milliLiter(s) IV Bolus every 5 minutes PRN SBP LESS THAN or EQUAL to 90 mmHg  sodium chloride 0.9% lock flush 10 milliLiter(s) IV Push every 1 hour PRN Pre/post blood products, medications, blood draw, and to maintain line patency      ALLERGIES:  Allergies    isoniazid (Rash)  nafcillin (Unknown)  hydrALAZINE (Rash)  vitamin E (Short breath; Urticaria; Hives)  doxycycline (Rash)  cefepime (Rash)  NIFEdipine (Urticaria; Hives)  Zosyn (Rash)    Intolerances        LABS:                        8.9    29.11 )-----------( 536      ( 22 Nov 2023 00:30 )             31.3     11-22    133<L>  |  98  |  53<H>  ----------------------------<  181<H>  4.8   |  25  |  2.28<H>    Ca    8.7      22 Nov 2023 00:30  Phos  5.4     11-22  Mg     2.50     11-22    TPro  5.9<L>  /  Alb  1.8<L>  /  TBili  <0.2  /  DBili  x   /  AST  38<H>  /  ALT  20  /  AlkPhos  382<H>  11-22    PT/INR - ( 22 Nov 2023 00:30 )   PT: 12.3 sec;   INR: 1.10 ratio         PTT - ( 22 Nov 2023 00:30 )  PTT:32.1 sec  Urinalysis Basic - ( 22 Nov 2023 00:30 )    Color: x / Appearance: x / SG: x / pH: x  Gluc: 181 mg/dL / Ketone: x  / Bili: x / Urobili: x   Blood: x / Protein: x / Nitrite: x   Leuk Esterase: x / RBC: x / WBC x   Sq Epi: x / Non Sq Epi: x / Bacteria: x      ABG:      vBG:    Micro:    Culture - Blood (collected 11-02-23 @ 09:10)  Source: .Blood Blood-Peripheral  Final Report (11-07-23 @ 12:01):    No growth at 5 days    Culture - Blood (collected 11-02-23 @ 09:00)  Source: .Blood Blood-Peripheral  Final Report (11-07-23 @ 12:01):    No growth at 5 days        Culture - Sputum (collected 11-02-23 @ 10:24)  Source: ET Tube ET Tube  Gram Stain (11-02-23 @ 15:31):    Moderate polymorphonuclear leukocytes per low power field    No Squamous epithelial cells per low power field    Moderate Gram Negative Rods per oil power field  Final Report (11-04-23 @ 22:56):    Numerous Pseudomonas aeruginosa (Carbapenem Resistant)  Organism: Pseudomonas aeruginosa (Carbapenem Resistant) (11-04-23 @ 22:56)  Organism: Pseudomonas aeruginosa (Carbapenem Resistant) (11-04-23 @ 22:56)      Method Type: CarbaR      -  Resistance Gene to Carbapenem: Nondet  Organism: Pseudomonas aeruginosa (Carbapenem Resistant) (11-04-23 @ 22:56)      Method Type: SIMON      -  Amikacin: S <=16      -  Aztreonam: R >16      -  Cefepime: I 16      -  Ceftazidime: I 16      -  Ceftazidime/Avibactam: S <=4      -  Ceftolozane/tazobactam: S <=2      -  Ciprofloxacin: R >2      -  Gentamicin: R >8      -  Imipenem: R >8      -  Levofloxacin: R >4      -  Meropenem: R >8      -  Piperacillin/Tazobactam: R >64      -  Tobramycin: R >8        RADIOLOGY & ADDITIONAL TESTS: Reviewed.

## 2023-11-22 NOTE — PROGRESS NOTE ADULT - ATTENDING COMMENTS
73 year old female with very complex medical history as above. Active issues is dialysis access. Has hx of right IJ venous occlusion, left HD catheter was removed over weekend due to malpositioning and poor blood flow. Not a candidate for Permacath placement as per IR given leukocytosis. Patient requires PO and IV vasopressors for persistent hypotension. Mentation is minimal off sedation.     Extensively discussed case with patient's family (patient's  and two daughters) at bedside. Conveyed that the overall prognosis is very poor. Patient may not be able to tolerate dialysis given hypotension. They remain hopeful however and wish to pursue more aggressive therapy. Will attempt to place temporary catheter at bedside. Labs reviewed. Follow up cultures. Monitoring off antibiotics. ID input appreciated. Supportive care. Overall prognosis is very poor.

## 2023-11-22 NOTE — PROCEDURE NOTE - NSCVLACTUALSITE_VASC_A_CORE
left/internal jugular
right/internal jugular
left/internal jugular
right/internal jugular
left/internal jugular
left/internal jugular

## 2023-11-22 NOTE — PROGRESS NOTE ADULT - ASSESSMENT
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       NEUROLOGY  #AMS  # Multiple embolic strokes   # L PCA Infarct   MR head performed w/ new infarct and old infarcts, EEG without seizure events but with high foci potential   - EEG given facial twitching: negative for epileptiform activity (10/31)  - deferring repeat MRI as unlikely to   - baseline  AOX3 at home per daughter, currently spontaneously opening eyes, localized to voice and pain, trached/non-verbal   - Keppra stopped 11/10 (was on as seizure ppx given multiple infarcts, no seizures on multiple EEG, stopped to ensure it is not contributing to sedation), the same mental status, -- opens eyes spontaneously, and shakes her head intermittently, not following commands    - Hgb stable not requiring PRBC since 11/4, ASA 81 mg restarted on 11/14  - continue Lipitor    CARDIOVASCULAR  # Septic vs vasoplegic shock   Etiology likely septic iso active infection. Possible component of vasoplegic, however no longer with active infection or on sedation. Off IV vasopressors.   Current regimen:   - droxidopa 600mg q8h with PRN 600mg ordered preHD  - midodrine 40mg q6 h; trial of additional 40mg prior to HD  - required levophed up to 0.14 during HD 11/15  weaned off after dialysis session, will need additional midodrine/droxidopa dose preHD  - Levo 0.05 resumed 11/21 -    # HFpEF w/ decompensation   > ECHO w/ EF 59 with severe LVH and diastolic dysfunction   - fluid overloaded, HD to remove fluids     HEENT   # Left ear OE with acute on chronic left-sided otomastoiditis, s/p Abx (see in ID)   - noted to have white discharged/residue, increased from prior per patient's daughter; ENT reconsulted, resumed on cipro drops (10/31 - 11/7),   # 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   - resolved    RESPIRATORY  # acute hypoxic resp failure second to volume overload, ventilator dependence   # Copious inline and oral thick tanned secretions   - CKD vs HFpEF w/ hypercarbia 2/2 volume overload requiring intubation, trach, and HD initiation  - Continue on albuterol and chest PT, cont 3 % INH, IPV  - Continue volume removal with HD  - hypoxemia w/ turning and repositioning, CXR 11/16 shows moderate right pleural effusion, worsening RUL atelectasis, left has small effusion and/or atelectasis. Plan for flexible bronchoscopy and possible thoracentesis  - CXR 11/16 shows moderate right pleural effusion with suggestion of worsening RUL atelectasis, small left effusion and/or atelectasis.  - Bronchoscopy bedside 11/16 noted with white secretions right > left  - f/u BAL     #tracheomalacia   - CT CHEST (9/8) with tracheomalacia, BL pleural effusions and persistent consolidations     GI  # GIB: last pRBC transfused 11/4 (10/14 before that)   - Continue on PPI BID  - patient had melena before 11/13 but H/H stable,  Eliquis was held for dark brown/black stool  - as per nursing staff, stool is black in the rectal system    # Dysphagia   - NGT- tolerating feeds, at goal- changed to nepro was per nutrition    RENAL  # ESRD, L ARTHUR taylor   - HD MWF TIW with midodrine and droxidopa   - ICU for vasopressor assisted volume removal, cap to 1 pressor and not offering CRRT due to the comorbidities and prognosis   - f/u w/ nephrology: will continue to offer patient 1-pressor assisted HD as agreed prior, as long as she can tolerate HD maxed on 1 pressor, Levo to 0.3; she will be considered iHD candidate  - IR was planning PermCath 11/14; but procedure was cancelled due to pt in MICU, on vasopressors, and has leucocytosis  - [facility] ilene discontinued 11/16 for line holiday, trialysis catheter placed in [facility] on 11/17 however was removed 11/18 iso poor flow rates during HD.  - IR re-consulted for permacath on 11/19- is requiring ID to clear patient given WBC  - ID recs appreciated. Per ID, risk of infection with shiley is higher than with a permcath.  Decision re placement of permcath is a risk benefit decision. Pt may continue to have leukocytosis in setting of clinical condition. Lower suspicion for infection at this time.   - IR permacath placement planned for 11/22, per nephro will likely have HD after    INFECTIOUS DISEASE  #Septic shock  , afebrile,  WBC - 15>27, LA wnls  #blood cx 9/1: MSSA with hypotension  repeat negative 9/4, 9/8 s/p 4 weeks of vanco/dapto through 10/2  #Colonized with  pseudomonas   # MSSA/ESBL E Coli PNA  # L otitis Externa s/p ENT debridement c/b Candida, s/p Meropenem and Fluconazole  # Proteus/ Pseudomonas in sputum, s/p Aztreonam   - recent Bcx 11/2 negative, sputum 11/2 with  pseudomonas- colonized   - repeat MRSA PCR neg  - afebrile now after completing course aztreonam 11/6  - monitoring off abx   - f/u BAL (11/16)- only afb was sent which is negative.     # possible malignant otitis externa   # ESBL ECOLI and MSSA VAP c/b MSSA bacteremia   - hx of MSSA bacteremia, ESBL E. Coli sputum Cx, BAL w/ MSSA  - treated with abx   - eosinophilia workup: JORGE, ANCAs negative    HEME  # Anemia second to GIB vs renal disease   - multiple transfusions, last done 11/4  - Eliquis held 2/2 pt having melena, and now pt with black stools/dark brown  - 11/14  restarted ASA 81 mg   - restarted Heparin SQ prophylaxis 11/14 today, will continue to hold off starting ac given melena.    #Allergic transfusion reaction: per RCU documentation, developed erythematous rash across chest shortly after starting transfusion, blood bank consulted and diagnosed mild allergic rxn  - premedicate w/ benadryl and famotidine prior to future transfusions  - no issue w/ pRBC transfusion 11/4    VASCULAR   # LLE POP DVT   - on Eliquis (held as of 11/11 PM for procedure and also patient having melena), stools are black/dark brown  - restarted Heparin SQ prophylaxis 11/14 and will continue to hold off starting Eliquis given melena.  - SCDs    # HD access  - TRISHA TAYLOR (9/27 - 10/21), replaced 10/24 -11/15  - TRISHA taylor discontinued 11/16 for line holiday, trialysis catheter placed in [facility] on 11/17 however was removed 11/18 iso poor flow rates during HD.  - IR permacath placement planned for 11/22    ONC   # Hx of Breast CA   - Patient dx in 2018 and s/p B/L mastectomy (radical on right) and chemo and RT.     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       NEUROLOGY  #AMS  # Multiple embolic strokes   # L PCA Infarct   MR head performed w/ new infarct and old infarcts, EEG without seizure events but with high foci potential   - EEG given facial twitching: negative for epileptiform activity (10/31)  - deferring repeat MRI as unlikely to   - baseline  AOX3 at home per daughter, currently spontaneously opening eyes, localized to voice and pain, trached/non-verbal   - Keppra stopped 11/10 (was on as seizure ppx given multiple infarcts, no seizures on multiple EEG, stopped to ensure it is not contributing to sedation), the same mental status, -- opens eyes spontaneously, and shakes her head intermittently, not following commands    - Hgb stable not requiring PRBC since 11/4, ASA 81 mg restarted on 11/14  - continue Lipitor    CARDIOVASCULAR  # Septic vs vasoplegic shock   Etiology likely septic iso active infection. Possible component of vasoplegic, however no longer with active infection or on sedation. Off IV vasopressors.   Current regimen:   - droxidopa 600mg q8h with PRN 600mg ordered preHD  - midodrine 40mg q6 h; trial of additional 40mg prior to HD  - required levophed up to 0.14 during HD 11/15  weaned off after dialysis session, will need additional midodrine/droxidopa dose preHD  - Levo 0.05 resumed 11/21 -    # HFpEF w/ decompensation   > ECHO w/ EF 59 with severe LVH and diastolic dysfunction   - fluid overloaded, HD to remove fluids     HEENT   # Left ear OE with acute on chronic left-sided otomastoiditis, s/p Abx (see in ID)   - noted to have white discharged/residue, increased from prior per patient's daughter; ENT reconsulted, resumed on cipro drops (10/31 - 11/7),   # 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   - resolved    RESPIRATORY  # acute hypoxic resp failure second to volume overload, ventilator dependence   # Copious inline and oral thick tanned secretions   - CKD vs HFpEF w/ hypercarbia 2/2 volume overload requiring intubation, trach, and HD initiation  - Continue on albuterol and chest PT, cont 3 % INH, IPV  - Continue volume removal with HD  - hypoxemia w/ turning and repositioning, CXR 11/16 shows moderate right pleural effusion, worsening RUL atelectasis, left has small effusion and/or atelectasis. Plan for flexible bronchoscopy and possible thoracentesis  - CXR 11/16 shows moderate right pleural effusion with suggestion of worsening RUL atelectasis, small left effusion and/or atelectasis.  - Bronchoscopy bedside 11/16 noted with white secretions right > left  - f/u BAL     #tracheomalacia   - CT CHEST (9/8) with tracheomalacia, BL pleural effusions and persistent consolidations     GI  # GIB: last pRBC transfused 11/4 (10/14 before that)   - Continue on PPI BID  - patient had melena before 11/13 but H/H stable,  Eliquis was held for dark brown/black stool  - as per nursing staff, stool is black in the rectal system    # Dysphagia   - NGT- tolerating feeds, at goal- changed to nepro was per nutrition    RENAL  # ESRD, L ARTHUR taylor   - HD MWF TIW with midodrine and droxidopa   - ICU for vasopressor assisted volume removal, cap to 1 pressor and not offering CRRT due to the comorbidities and prognosis   - f/u w/ nephrology: will continue to offer patient 1-pressor assisted HD as agreed prior, as long as she can tolerate HD maxed on 1 pressor, Levo to 0.3; she will be considered iHD candidate  - IR was planning PermCath 11/14; but procedure was cancelled due to pt in MICU, on vasopressors, and has leucocytosis  - [facility] ilene discontinued 11/16 for line holiday, trialysis catheter placed in [facility] on 11/17 however was removed 11/18 iso poor flow rates during HD.  - IR re-consulted for permacath on 11/19- is requiring ID to clear patient given WBC  - ID recs appreciated. Per ID, risk of infection with shiley is higher than with a permcath.  Decision re placement of permcath is a risk benefit decision. Pt may continue to have leukocytosis in setting of clinical condition, likely multifactorial  - IR permacath placement planned for 11/22, per nephro will likely have HD after    INFECTIOUS DISEASE  #Septic shock  , afebrile,  WBC - 15>27, LA wnls  #blood cx 9/1: MSSA with hypotension  repeat negative 9/4, 9/8 s/p 4 weeks of vanco/dapto through 10/2  #Colonized with  pseudomonas   # MSSA/ESBL E Coli PNA  # L otitis Externa s/p ENT debridement c/b Candida, s/p Meropenem and Fluconazole  # Proteus/ Pseudomonas in sputum, s/p Aztreonam   - recent Bcx 11/2 negative, sputum 11/2 with  pseudomonas- colonized   - repeat MRSA PCR neg  - afebrile now after completing course aztreonam 11/6  - monitoring off abx   - f/u BAL (11/16)- only afb was sent which is negative.   - f/u BCx 11/22    # possible malignant otitis externa   # ESBL ECOLI and MSSA VAP c/b MSSA bacteremia   - hx of MSSA bacteremia, ESBL E. Coli sputum Cx, BAL w/ MSSA  - treated with abx   - eosinophilia workup: JORGE, ANCAs negative    HEME  # Anemia second to GIB vs renal disease   - multiple transfusions, last done 11/4  - Eliquis held 2/2 pt having melena, and now pt with black stools/dark brown  - 11/14  restarted ASA 81 mg   - restarted Heparin SQ prophylaxis 11/14 today, will continue to hold off starting ac given melena.    #Allergic transfusion reaction: per RCU documentation, developed erythematous rash across chest shortly after starting transfusion, blood bank consulted and diagnosed mild allergic rxn  - premedicate w/ benadryl and famotidine prior to future transfusions  - no issue w/ pRBC transfusion 11/4    VASCULAR   # LLE POP DVT   - on Eliquis (held as of 11/11 PM for procedure and also patient having melena), stools are black/dark brown  - restarted Heparin SQ prophylaxis 11/14 and will continue to hold off starting Eliquis given melena.  - SCDs    # HD access  - TRISHA TAYLOR (9/27 - 10/21), replaced 10/24 -11/15  - TRISHA taylor discontinued 11/16 for line holiday, trialysis catheter placed in [facility] on 11/17 however was removed 11/18 iso poor flow rates during HD.  - IR permacath placement planned for 11/22    ONC   # Hx of Breast CA   - Patient dx in 2018 and s/p B/L mastectomy (radical on right) and chemo and RT.     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       NEUROLOGY  #AMS  # Multiple embolic strokes   # L PCA Infarct   MR head performed w/ new infarct and old infarcts, EEG without seizure events but with high foci potential   - EEG given facial twitching: negative for epileptiform activity (10/31)  - deferring repeat MRI as unlikely to   - baseline AOX3 at home per daughter, trached/non-verbal   - c/w ASA 81 mg   - continue Lipitor    CARDIOVASCULAR  # Septic vs vasoplegic shock   Etiology likely septic iso active infection. Possible component of vasoplegic, however no longer with active infection or on sedation. Off IV vasopressors.   Current regimen:   - droxidopa 600mg q8h with PRN 600mg ordered preHD  - midodrine 40mg q6 h; trial of additional 40mg prior to HD  - required levophed up to 0.14 during HD 11/15  weaned off after dialysis session, will need additional midodrine/droxidopa dose preHD  - Levophed 0.05 resumed 11/21 -    # HFpEF w/ decompensation   > ECHO w/ EF 59 with severe LVH and diastolic dysfunction   - fluid overloaded, HD to remove fluids     HEENT   # Left ear OE with acute on chronic left-sided otomastoiditis, s/p Abx (see in ID)   - noted to have white discharged/residue, increased from prior per patient's daughter; ENT reconsulted, resumed on cipro drops (10/31 - 11/7),   # 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   - resolved    RESPIRATORY  # acute hypoxic resp failure second to volume overload, ventilator dependence   # Copious inline and oral thick tanned secretions   - CKD vs HFpEF w/ hypercarbia 2/2 volume overload requiring intubation, trach, and HD initiation  - Continue on albuterol and chest PT, cont 3 % INH, IPV  - Continue volume removal with HD  - hypoxemia w/ turning and repositioning, CXR 11/16 shows moderate right pleural effusion, worsening RUL atelectasis, left has small effusion and/or atelectasis  - Bronchoscopy bedside 11/16 noted with white secretions right > left  - f/u BAL     #tracheomalacia   - CT CHEST (9/8) with tracheomalacia, BL pleural effusions and persistent consolidations     GI  # GIB: last pRBC transfused 11/4 (10/14 before that)   - Continue on PPI BID  - patient had melena before 11/13 but H/H stable,  Eliquis was held for dark brown/black stool  - as per nursing staff, stool is black in the rectal system    # Dysphagia   - NGT- tolerating feeds, at goal- changed to nepro was per nutrition    RENAL  # ESRD, L IJ shiley   - HD MWF TIW with midodrine and droxidopa   - ICU for vasopressor assisted volume removal, cap to 1 pressor and not offering CRRT due to the comorbidities and prognosis   - f/u w/ nephrology: will continue to offer patient 1-pressor assisted HD as agreed prior, as long as she can tolerate HD maxed on 1 pressor, Levo to 0.3; she will be considered iHD candidate  - IR was planning PermCath 11/14; but procedure was cancelled due to pt in MICU, on vasopressors, and has leucocytosis  - [facility] shiley discontinued 11/16 for line holiday, trialysis catheter placed in [facility] on 11/17 however was removed 11/18 iso poor flow rates during HD.  - IR re-consulted for permacath on 11/19- is requiring ID to clear patient given WBC  - ID recs appreciated. Per ID, risk of infection with shiley is higher than with a permcath.  Decision re placement of permcath is a risk benefit decision. Pt may continue to have leukocytosis in setting of clinical condition, likely multifactorial  - Per IR, patient is not a candidate for a tunneled HD catheter at this time given uptrending leukocytosis    INFECTIOUS DISEASE  #Septic shock  , afebrile,  WBC - 15>27, LA wnls  #blood cx 9/1: MSSA with hypotension  repeat negative 9/4, 9/8 s/p 4 weeks of vanco/dapto through 10/2  #Colonized with  pseudomonas   # MSSA/ESBL E Coli PNA  # L otitis Externa s/p ENT debridement c/b Candida, s/p Meropenem and Fluconazole  # Proteus/ Pseudomonas in sputum, s/p Aztreonam   - recent Bcx 11/2 negative, sputum 11/2 with  pseudomonas- colonized   - repeat MRSA PCR neg  - afebrile now after completing course aztreonam 11/6  - monitoring off abx   - f/u BAL (11/16)- only afb was sent which is negative.   - f/u BCx 11/22    # possible malignant otitis externa   # ESBL ECOLI and MSSA VAP c/b MSSA bacteremia   - hx of MSSA bacteremia, ESBL E. Coli sputum Cx, BAL w/ MSSA  - treated with abx   - eosinophilia workup: JORGE, ANCAs negative    HEME  # Anemia second to GIB vs renal disease   - multiple transfusions, last done 11/4  - Eliquis held 2/2 pt having melena, and now pt with black stools/dark brown  - 11/14  restarted ASA 81 mg   - restarted Heparin SQ prophylaxis 11/14 today, will continue to hold off starting ac given melena.    #Allergic transfusion reaction: per RCU documentation, developed erythematous rash across chest shortly after starting transfusion, blood bank consulted and diagnosed mild allergic rxn  - premedicate w/ benadryl and famotidine prior to future transfusions  - no issue w/ pRBC transfusion 11/4    VASCULAR   # LLE POP DVT   - on Eliquis (held as of 11/11 PM for procedure and also patient having melena), stools are black/dark brown  - restarted Heparin SQ prophylaxis 11/14 and will continue to hold off starting Eliquis given melena.  - SCDs    # HD access  - TRISHA TAYLOR (9/27 - 10/21), replaced 10/24 -11/15  - TRISHA taylor discontinued 11/16 for line holiday, trialysis catheter placed in [facility] on 11/17 however was removed 11/18 iso poor flow rates during HD.  - Per IR, patient is not a candidate for a tunneled HD catheter given uptrending leukocytosis    ONC   # Hx of Breast CA   - Patient dx in 2018 and s/p B/L mastectomy (radical on right) and chemo and RT.     ENDOCRINE  # IDDM2   - Continue on NPH 6U with moderate ISS   - Adjust as needed    ETHICS/ GOC    - Attempted palliative discussion however family not interested and wishes for FULL CODE  - Family continues to want everything done despite inability to tolerate HD on multiple oral pressor  - Levo capped at 0.3 during HD

## 2023-11-22 NOTE — PROGRESS NOTE ADULT - ASSESSMENT
74 y/o F well known to me from my [facility] [place] [facility]. she was admitted at [facility] 7/12-7/22 w aspiration PNA, was treated w CEFEPIME, developed an allergic rash,  dCHF, + MAC on AFB culture, had been progressively getting more and more lethargic and dyspneic at home since DC.   In  am of 8/11/23  ptn presented with respiratory distress w hypoxia and hypercarbia requiring intubation 2/2 volume overload +/- Asp PNA      Neuro   not responsive  Baseline MS AOx3, aphasic   - h/o CVA , on aspirin and statin . resumed w feeding tube, ASA resumed 8/26  - eeg  2/2 tremors, no sz focus, right facial twitching, EEG has been negative. 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 probably embolic in nature, did not tolerate full AC in the past, STEPHANIE is neg , no shunts observed  - ptn is poorly reactive, rpt scan done, no further CVA seen.     Cardiac   cardiology following  CHFpEF   TTE 7/2023 with EF 59%, with severe LVH and diastolic dysfunction   off Levo drip , now only during HD  on midodrine 40 mg qid, droxidopa 600 tid, additional 200 mg prior to HD,     Pulmonary   Acute hypercapnic and hypoxemic respiratory failure   prolonged intubation, now  trache to  vent, has copious secretions      Renal   on HD via L IJ Shiley, tunneled catheter when clinically stable  HD MWF, midodrine and pressor assisted,   permacath  scheduled to be placed in IR. cleared for PAC placement by ID.   permacath will not be placed in IR 2/2 not medically stable.   awaiting shiley placement to resume HD today        GI  NPO  on tube feeds  UGI bleed 8/31,  EGD/Colonoscopy: erosive gastropathy, esophagitis on colonoscopy old blood seen no active bleeding, poor prep  NGT-TF, Nepro  s/p 2UPRBC 11/4    Endocrine  on Insulin: NPH, Admelog    ID   complicated infectious hospital course  treated for MSSA bacteremia, aspiration PNA.  sputum + for pseudomonas, ptn was initially on zosyn but was allergic, then was switched to aztreonam   Aztreonam was switched to polymyxin for a possible allergy but ptn didnt have a reaction to aztreonam, she had a reaction to Zosyn.   spike temps again while off Abx, Aztreonam resumed 10/17, DCed 10/29  tmax 104.6 on 11/2-11/5, Cx NTD  leukocytosis is down today to 19 from 27K on 11/17. presumed this is 2/2 BAL via bronch on 11/16. afebrile  cont observing off ABx. ID follow up reviewed . 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 externa resolved    Heme/Onc  on eliquis for  LEFT POPLITEAL VEIN ACUTE DVT , on ELiquis    Ethics  GOC - Discussed GOC with daughter and , they have opted in the past for full code. and she remains full code at present

## 2023-11-22 NOTE — PROGRESS NOTE ADULT - ASSESSMENT
73 paola old with DM, prior CVA, kidney disease  Now with respiratory failure  Requiring HD- but difficulty tolerating    S/p bronch with removal of secretions  Vent setting stable    Leukocytosis    Known colonization with MDRO pseudomonas    Leukocytosis is likely multifactorial.  Her WBC has been elevated since early October.     She will remain at high risk for recurrent infection including pneumonia, soft tissue injections and blood stream infections.   The risk of infection is higher with temporary lines than with a permcath.     If there is concern for infection repeat blood cultures, sputum culture and imaging. If she has a change in status, would give polymixin, vanco, and flagyl pending imaging and cultures.     Her overall prognosis is grave.  Her airways are chronically colonized.  She is at risk for recurrent pna with MDRO organisms and has limited treatment options. Prior cultures with MDR organisms and she had a drug reaction to cephalosporins.  At this time, her respiratory status is stable.     Ultimately, the decision re permcath placement is a risk benefit decision.     Prognosis grave. Options limited  Continue GOC discussions      Call ID service with questions

## 2023-11-22 NOTE — PROCEDURE NOTE - NSANATOMICLLOCATION_GEN_A_CORE
brachial vein/left
left
left/upper arm
left/upper arm
left/forearm
left/axillae
left/upper arm
brachial artery/left
left/axillae

## 2023-11-22 NOTE — PROCEDURE NOTE - ADDITIONAL PROCEDURE DETAILS
Arterial line placed emergently due to escalating vasopressor requirements.
Patient positioned upright. A 12F nasogastric tube was inserted via the L-nare with resistance met and the tube was removed and reinserted via R-nare, advanced, and secured at the 50cm eveline. Borborygmi was auscultated over the LUQ upon insufflation via piston syringe. Patient tolerated procedure well. SPO2 remained stable during procedure. Confirmed via CXR. No immediate complications.
one episode of tachy (19 seconds in duration at 207 bpm) recorded on 8/11/2023
Critically ill pt requiring PIV access for medical management and/or pressor support. Under US guidance identified Left/Right vein, and successfully placed 18g x 5.7cm Angiocath extend dwell angiocath into vessel. Placement confirmed s/p with ultrasound and catheter determined to be in patent lumen of vein. Pt tolerated well w/o complication.
18G 5.71CM long, AccuCath extended dwell IV catheter placed under dynamic US guidance in left basilic vein with dark nonpulsatile blood return.  Catheter was flushed afterwards without any resistance or resulting extravasation.  IV catheter confirmed in compressible vein after insertion.
REF [id number]  LOT [id number]
Patient was properly identified using name and . [employer] Lot #[id number] utilized. Site prepped using chlorhexidine and tourniquet applied. Ultrasound was used to identify a LUE forearm, easily compressible, no thrombus, non-pulsatile. A  20G x 5.7cm [employer] Ace Intravascular Catheter was introduced into the skin and inserted into the identified vessel. Flash seen in needle housing and placement confirmed on US. Guidewire advanced and catheter advanced over guide wire. Needle withdrawn and placement again confirmed with US visualization. Clave with saline flush attached and placement again confirmed with positive blood return/ flash. IV flushes easily without resistance, and no swelling appreciated at insertion site. Site cleaned with alcohol, Cavilon skin prep applied, and cath secured with central line dressing. Patient tolerated procedure well with no complications and no blood loss. Nursing staff informed and dressing labeled with initials, date and time. Dressing to be changed Qweekly.
Patient was properly identified using name and . [employer] Lot #[id number] utilized. Site prepped using chlorhexidine and tourniquet applied. Ultrasound was used to identify a LUE, easily compressible, no thrombus, non-pulsatile vein. A  20G x 5.7cm [employer] Ace Intravascular Catheter was introduced into the skin and inserted into the identified vessel. Flash seen in needle housing and placement confirmed on US. Guidewire advanced and catheter advanced over guide wire. Needle withdrawn and placement again confirmed with US visualization. Clave with saline flush attached and placement again confirmed with positive blood return/ flash. IV flushes easily without resistance, and no swelling appreciated at insertion site. Site cleaned with alcohol, Cavilon skin prep applied, and cath secured with central line dressing. Patient tolerated procedure well with no complications and no blood loss. Nursing staff informed and dressing labeled with initials, date and time. Dressing to be changed Qweekly.     -Rudi DUNBAR

## 2023-11-22 NOTE — PROGRESS NOTE ADULT - SUBJECTIVE AND OBJECTIVE BOX
Patient is a 73y old  Female who presents with a chief complaint of Respiratory distress (22 Nov 2023 17:14)      SUBJECTIVE / OVERNIGHT EVENTS: wbc is at 29K, afebrile, remains off ABx. permacath will not be placed in IR 2/2 not medically stable. awaiting shiley placement to resume HD today    MEDICATIONS  (STANDING):  albuterol/ipratropium for Nebulization 3 milliLiter(s) Nebulizer every 6 hours  artificial tears (preservative free) Ophthalmic Solution 1 Drop(s) Both EYES every 4 hours  aspirin  chewable 81 milliGRAM(s) Enteral Tube daily  atorvastatin 10 milliGRAM(s) Oral at bedtime  calamine/zinc oxide Lotion 1 Application(s) Topical daily  chlorhexidine 0.12% Liquid 15 milliLiter(s) Oral Mucosa every 12 hours  chlorhexidine 2% Cloths 1 Application(s) Topical daily  chlorhexidine 4% Liquid 1 Application(s) Topical <User Schedule>  dextrose 5%. 1000 milliLiter(s) (50 mL/Hr) IV Continuous <Continuous>  dextrose 5%. 1000 milliLiter(s) (100 mL/Hr) IV Continuous <Continuous>  dextrose 50% Injectable 12.5 Gram(s) IV Push once  dextrose 50% Injectable 25 Gram(s) IV Push once  dextrose 50% Injectable 25 Gram(s) IV Push once  dextrose 50% Injectable 25 Gram(s) IV Push once  droxidopa 600 milliGRAM(s) Oral every 8 hours  epoetin odalis (EPOGEN) Injectable 31614 Unit(s) IV Push once  epoetin odalis (EPOGEN) Injectable 60863 Unit(s) IV Push <User Schedule>  ferrous    sulfate Liquid 300 milliGRAM(s) Oral daily  glucagon  Injectable 1 milliGRAM(s) IntraMuscular once  heparin   Injectable 5000 Unit(s) SubCutaneous every 8 hours  insulin lispro (ADMELOG) corrective regimen sliding scale   SubCutaneous every 6 hours  insulin NPH human recombinant 6 Unit(s) SubCutaneous every 6 hours  midodrine. 40 milliGRAM(s) Oral <User Schedule>  norepinephrine Infusion 0.05 MICROgram(s)/kG/Min (6.23 mL/Hr) IV Continuous <Continuous>  pantoprazole  Injectable 40 milliGRAM(s) IV Push two times a day  petrolatum Ophthalmic Ointment 1 Application(s) Both EYES four times a day  sodium chloride 1 Gram(s) Oral two times a day  sodium chloride 3%  Inhalation 4 milliLiter(s) Inhalation every 6 hours    MEDICATIONS  (PRN):  acetaminophen   Oral Liquid .. 650 milliGRAM(s) Oral every 6 hours PRN Temp greater or equal to 38C (100.4F), Mild Pain (1 - 3)  dextrose Oral Gel 15 Gram(s) Oral once PRN Blood Glucose LESS THAN 70 milliGRAM(s)/deciliter  diphenhydrAMINE Elixir 50 milliGRAM(s) Oral once PRN Rash and/or Itching  droxidopa 600 milliGRAM(s) Oral <User Schedule> PRN prior to hemodialysis  midodrine. 40 milliGRAM(s) Oral once PRN 1 Hour Pre HD  sodium chloride 0.9% Bolus. 250 milliLiter(s) IV Bolus every 5 minutes PRN SBP LESS THAN or EQUAL to 90 mmHg  sodium chloride 0.9% lock flush 10 milliLiter(s) IV Push every 1 hour PRN Pre/post blood products, medications, blood draw, and to maintain line patency      Vital Signs Last 24 Hrs  T(F): 97.5 (11-22-23 @ 16:00), Max: 98.1 (11-22-23 @ 00:00)  HR: 107 (11-22-23 @ 19:00) (98 - 110)  BP: --  RR: 24 (11-22-23 @ 19:00) (13 - 24)  SpO2: 100% (11-22-23 @ 19:00) (96% - 100%)  Telemetry:   CAPILLARY BLOOD GLUCOSE      POCT Blood Glucose.: 158 mg/dL (22 Nov 2023 18:17)  POCT Blood Glucose.: 122 mg/dL (22 Nov 2023 11:13)  POCT Blood Glucose.: 127 mg/dL (22 Nov 2023 05:17)  POCT Blood Glucose.: 162 mg/dL (22 Nov 2023 00:25)    I&O's Summary    21 Nov 2023 07:01  -  22 Nov 2023 07:00  --------------------------------------------------------  IN: 330.1 mL / OUT: 0 mL / NET: 330.1 mL    22 Nov 2023 07:01  -  22 Nov 2023 19:17  --------------------------------------------------------  IN: 363.7 mL / OUT: 0 mL / NET: 363.7 mL        PHYSICAL EXAM:  GENERAL: NAD, well-developed  HEAD:  Atraumatic, Normocephalic  EYES: EOMI, PERRLA, conjunctiva and sclera clear  NECK: Supple, No JVD  CHEST/LUNG: Clear to auscultation bilaterally; No wheeze  HEART: Regular rate and rhythm; No murmurs, rubs, or gallops  ABDOMEN: Soft, Nontender, Nondistended; Bowel sounds present  EXTREMITIES:  2+ Peripheral Pulses, No clubbing, cyanosis, or edema  PSYCH: AAOx3  NEUROLOGY: non-focal  SKIN: No rashes or lesions    LABS:                        8.9    29.11 )-----------( 536      ( 22 Nov 2023 00:30 )             31.3     11-22    133<L>  |  98  |  53<H>  ----------------------------<  181<H>  4.8   |  25  |  2.28<H>    Ca    8.7      22 Nov 2023 00:30  Phos  5.4     11-22  Mg     2.50     11-22    TPro  5.9<L>  /  Alb  1.8<L>  /  TBili  <0.2  /  DBili  x   /  AST  38<H>  /  ALT  20  /  AlkPhos  382<H>  11-22    PT/INR - ( 22 Nov 2023 00:30 )   PT: 12.3 sec;   INR: 1.10 ratio         PTT - ( 22 Nov 2023 00:30 )  PTT:32.1 sec      Urinalysis Basic - ( 22 Nov 2023 00:30 )    Color: x / Appearance: x / SG: x / pH: x  Gluc: 181 mg/dL / Ketone: x  / Bili: x / Urobili: x   Blood: x / Protein: x / Nitrite: x   Leuk Esterase: x / RBC: x / WBC x   Sq Epi: x / Non Sq Epi: x / Bacteria: x        RADIOLOGY & ADDITIONAL TESTS:    Imaging Personally Reviewed:    Consultant(s) Notes Reviewed:      Care Discussed with Consultants/Other Providers:

## 2023-11-22 NOTE — PROCEDURE NOTE - ATTENDING PROVIDER
Dr. Lee
Dr. Munroe
Dr Mcnamara
Dr. Stratton
Dr. Lee
Dr. Munroe
Dr. Singh
Dr. Khalil
Dr. Mariah Liu
Dr Mcnamara
Mitchell
Dr. Lee
Jesus Hargrove
Dr. Campo

## 2023-11-22 NOTE — PROGRESS NOTE ADULT - SUBJECTIVE AND OBJECTIVE BOX
[facility] [phone]        Patient seen and examined tunneled catheter postponed by IR due to leukocytosis.         MEDICATIONS  (STANDING):  albuterol/ipratropium for Nebulization 3 milliLiter(s) Nebulizer every 6 hours  artificial tears (preservative free) Ophthalmic Solution 1 Drop(s) Both EYES every 4 hours  aspirin  chewable 81 milliGRAM(s) Enteral Tube daily  atorvastatin 10 milliGRAM(s) Oral at bedtime  calamine/zinc oxide Lotion 1 Application(s) Topical daily  chlorhexidine 0.12% Liquid 15 milliLiter(s) Oral Mucosa every 12 hours  chlorhexidine 2% Cloths 1 Application(s) Topical daily  chlorhexidine 4% Liquid 1 Application(s) Topical <User Schedule>  dextrose 5%. 1000 milliLiter(s) (100 mL/Hr) IV Continuous <Continuous>  dextrose 5%. 1000 milliLiter(s) (50 mL/Hr) IV Continuous <Continuous>  dextrose 50% Injectable 25 Gram(s) IV Push once  dextrose 50% Injectable 25 Gram(s) IV Push once  dextrose 50% Injectable 25 Gram(s) IV Push once  dextrose 50% Injectable 12.5 Gram(s) IV Push once  droxidopa 600 milliGRAM(s) Oral every 8 hours  epoetin odalis (EPOGEN) Injectable 65247 Unit(s) IV Push <User Schedule>  ferrous    sulfate Liquid 300 milliGRAM(s) Oral daily  glucagon  Injectable 1 milliGRAM(s) IntraMuscular once  heparin   Injectable 5000 Unit(s) SubCutaneous every 8 hours  insulin lispro (ADMELOG) corrective regimen sliding scale   SubCutaneous every 6 hours  insulin NPH human recombinant 6 Unit(s) SubCutaneous every 6 hours  midodrine. 40 milliGRAM(s) Oral <User Schedule>  norepinephrine Infusion 0.05 MICROgram(s)/kG/Min (6.23 mL/Hr) IV Continuous <Continuous>  pantoprazole  Injectable 40 milliGRAM(s) IV Push two times a day  petrolatum Ophthalmic Ointment 1 Application(s) Both EYES four times a day  sodium chloride 1 Gram(s) Oral two times a day  sodium chloride 3%  Inhalation 4 milliLiter(s) Inhalation every 6 hours      VITAL:  T(C): , Max: 37.3 (11-21-23 @ 16:00)  T(F): , Max: 99.2 (11-21-23 @ 16:00)  HR: 102 (11-22-23 @ 11:27)  BP: --  BP(mean): --  RR: 24 (11-22-23 @ 10:00)  SpO2: 100% (11-22-23 @ 11:27)  Wt(kg): --    I and O's:    11-21 @ 07:01  -  11-22 @ 07:00  --------------------------------------------------------  IN: 330.1 mL / OUT: 0 mL / NET: 330.1 mL    11-22 @ 07:01  -  11-22 @ 14:29  --------------------------------------------------------  IN: 105 mL / OUT: 0 mL / NET: 105 mL          PHYSICAL EXAM:      LABS:                        8.9    29.11 )-----------( 536      ( 22 Nov 2023 00:30 )             31.3     11-22    133<L>  |  98  |  53<H>  ----------------------------<  181<H>  4.8   |  25  |  2.28<H>    Ca    8.7      22 Nov 2023 00:30  Phos  5.4     11-22  Mg     2.50     11-22    TPro  5.9<L>  /  Alb  1.8<L>  /  TBili  <0.2  /  DBili  x   /  AST  38<H>  /  ALT  20  /  AlkPhos  382<H>  11-22          Urine Studies:  Urinalysis Basic - ( 22 Nov 2023 00:30 )    Color: x / Appearance: x / SG: x / pH: x  Gluc: 181 mg/dL / Ketone: x  / Bili: x / Urobili: x   Blood: x / Protein: x / Nitrite: x   Leuk Esterase: x / RBC: x / WBC x   Sq Epi: x / Non Sq Epi: x / Bacteria: x            RADIOLOGY & ADDITIONAL STUDIES:            Assessment and Plan:   · Assessment	    IMPRESSION: 73F w/ HTN, DM2, CVA, breast CA-bilateral mastectomy, recurrent aspiration pneumonia/respiratory failure, and CKD, 8/11/23 p/w acute hypercapnic respiratory failure; c/b RUSS    (1)Renal - RUSS on CKD4 ==>newly ESRD-HD as of this admission. Last dialyzed Friday     (2)CV- tenuous hemodynamics - intermittently requiring pressor gtt/on q6h Midodrine     (3)Pulm- trach/vent-dependent    (4)Anemia- on Epogen with HD    (5)GOC - s/p repeated discussions with family regarding GOC - family persisting with interest in aggressive measures.     RECOMMEND:  (1)     Tere Arauz NP  [facility]  [phone]      [facility] [phone]        Patient seen and examined tunneled catheter postponed by IR due to leukocytosis. Shiley placed at bedside this afternoon, awaiting xray for confirmation.   She remains on vasopressor support.       MEDICATIONS  (STANDING):  albuterol/ipratropium for Nebulization 3 milliLiter(s) Nebulizer every 6 hours  artificial tears (preservative free) Ophthalmic Solution 1 Drop(s) Both EYES every 4 hours  aspirin  chewable 81 milliGRAM(s) Enteral Tube daily  atorvastatin 10 milliGRAM(s) Oral at bedtime  calamine/zinc oxide Lotion 1 Application(s) Topical daily  chlorhexidine 0.12% Liquid 15 milliLiter(s) Oral Mucosa every 12 hours  chlorhexidine 2% Cloths 1 Application(s) Topical daily  chlorhexidine 4% Liquid 1 Application(s) Topical <User Schedule>  dextrose 5%. 1000 milliLiter(s) (100 mL/Hr) IV Continuous <Continuous>  dextrose 5%. 1000 milliLiter(s) (50 mL/Hr) IV Continuous <Continuous>  dextrose 50% Injectable 25 Gram(s) IV Push once  dextrose 50% Injectable 25 Gram(s) IV Push once  dextrose 50% Injectable 25 Gram(s) IV Push once  dextrose 50% Injectable 12.5 Gram(s) IV Push once  droxidopa 600 milliGRAM(s) Oral every 8 hours  epoetin odalis (EPOGEN) Injectable 10170 Unit(s) IV Push <User Schedule>  ferrous    sulfate Liquid 300 milliGRAM(s) Oral daily  glucagon  Injectable 1 milliGRAM(s) IntraMuscular once  heparin   Injectable 5000 Unit(s) SubCutaneous every 8 hours  insulin lispro (ADMELOG) corrective regimen sliding scale   SubCutaneous every 6 hours  insulin NPH human recombinant 6 Unit(s) SubCutaneous every 6 hours  midodrine. 40 milliGRAM(s) Oral <User Schedule>  norepinephrine Infusion 0.05 MICROgram(s)/kG/Min (6.23 mL/Hr) IV Continuous <Continuous>  pantoprazole  Injectable 40 milliGRAM(s) IV Push two times a day  petrolatum Ophthalmic Ointment 1 Application(s) Both EYES four times a day  sodium chloride 1 Gram(s) Oral two times a day  sodium chloride 3%  Inhalation 4 milliLiter(s) Inhalation every 6 hours      VITAL:  T(C): , Max: 37.3 (11-21-23 @ 16:00)  T(F): , Max: 99.2 (11-21-23 @ 16:00)  HR: 102 (11-22-23 @ 11:27)  BP: --  BP(mean): --  RR: 24 (11-22-23 @ 10:00)  SpO2: 100% (11-22-23 @ 11:27)  Wt(kg): --    I and O's:    11-21 @ 07:01  -  11-22 @ 07:00  --------------------------------------------------------  IN: 330.1 mL / OUT: 0 mL / NET: 330.1 mL    11-22 @ 07:01  -  11-22 @ 14:29  --------------------------------------------------------  IN: 105 mL / OUT: 0 mL / NET: 105 mL          PHYSICAL EXAM:  Constitutional: critically ill, trach to vent   HEENT: + NGT, + tracheostomy   Respiratory: coarse BS  Cardiovascular: tachy s1s2  Gastrointestinal: BS+, soft, NT/ND  Extremities: ++ generalized edema  : No Wheeler  Skin: No rashes  Access: [facility]     LABS:                        8.9    29.11 )-----------( 536      ( 22 Nov 2023 00:30 )             31.3     11-22    133<L>  |  98  |  53<H>  ----------------------------<  181<H>  4.8   |  25  |  2.28<H>    Ca    8.7      22 Nov 2023 00:30  Phos  5.4     11-22  Mg     2.50     11-22    TPro  5.9<L>  /  Alb  1.8<L>  /  TBili  <0.2  /  DBili  x   /  AST  38<H>  /  ALT  20  /  AlkPhos  382<H>  11-22          Urine Studies:  Urinalysis Basic - ( 22 Nov 2023 00:30 )    Color: x / Appearance: x / SG: x / pH: x  Gluc: 181 mg/dL / Ketone: x  / Bili: x / Urobili: x   Blood: x / Protein: x / Nitrite: x   Leuk Esterase: x / RBC: x / WBC x   Sq Epi: x / Non Sq Epi: x / Bacteria: x        Assessment and Plan:   · Assessment	    IMPRESSION: 73F w/ HTN, DM2, CVA, breast CA-bilateral mastectomy, recurrent aspiration pneumonia/respiratory failure, and CKD, 8/11/23 p/w acute hypercapnic respiratory failure; c/b RUSS    (1)Renal - RUSS on CKD4 ==>newly ESRD-HD as of this admission. Last dialyzed Friday     (2)CV- tenuous hemodynamics - intermittently requiring pressor gtt/on q6h Midodrine     (3)Pulm- trach/vent-dependent    (4)Anemia- on Epogen with HD    (5)GOC - s/p repeated discussions with family regarding GOC - family persisting with interest in aggressive measures.     RECOMMEND:  (1)     Tere Arauz NP  [facility]  [phone]      [facility] [phone]        Patient seen and examined tunneled catheter postponed by IR due to leukocytosis. Shiley placed at bedside this afternoon, awaiting xray for confirmation.   She remains on vasopressor support.       MEDICATIONS  (STANDING):  albuterol/ipratropium for Nebulization 3 milliLiter(s) Nebulizer every 6 hours  artificial tears (preservative free) Ophthalmic Solution 1 Drop(s) Both EYES every 4 hours  aspirin  chewable 81 milliGRAM(s) Enteral Tube daily  atorvastatin 10 milliGRAM(s) Oral at bedtime  calamine/zinc oxide Lotion 1 Application(s) Topical daily  chlorhexidine 0.12% Liquid 15 milliLiter(s) Oral Mucosa every 12 hours  chlorhexidine 2% Cloths 1 Application(s) Topical daily  chlorhexidine 4% Liquid 1 Application(s) Topical <User Schedule>  dextrose 5%. 1000 milliLiter(s) (100 mL/Hr) IV Continuous <Continuous>  dextrose 5%. 1000 milliLiter(s) (50 mL/Hr) IV Continuous <Continuous>  dextrose 50% Injectable 25 Gram(s) IV Push once  dextrose 50% Injectable 25 Gram(s) IV Push once  dextrose 50% Injectable 25 Gram(s) IV Push once  dextrose 50% Injectable 12.5 Gram(s) IV Push once  droxidopa 600 milliGRAM(s) Oral every 8 hours  epoetin odalis (EPOGEN) Injectable 08286 Unit(s) IV Push <User Schedule>  ferrous    sulfate Liquid 300 milliGRAM(s) Oral daily  glucagon  Injectable 1 milliGRAM(s) IntraMuscular once  heparin   Injectable 5000 Unit(s) SubCutaneous every 8 hours  insulin lispro (ADMELOG) corrective regimen sliding scale   SubCutaneous every 6 hours  insulin NPH human recombinant 6 Unit(s) SubCutaneous every 6 hours  midodrine. 40 milliGRAM(s) Oral <User Schedule>  norepinephrine Infusion 0.05 MICROgram(s)/kG/Min (6.23 mL/Hr) IV Continuous <Continuous>  pantoprazole  Injectable 40 milliGRAM(s) IV Push two times a day  petrolatum Ophthalmic Ointment 1 Application(s) Both EYES four times a day  sodium chloride 1 Gram(s) Oral two times a day  sodium chloride 3%  Inhalation 4 milliLiter(s) Inhalation every 6 hours      VITAL:  T(C): , Max: 37.3 (11-21-23 @ 16:00)  T(F): , Max: 99.2 (11-21-23 @ 16:00)  HR: 102 (11-22-23 @ 11:27)  BP: --  BP(mean): --  RR: 24 (11-22-23 @ 10:00)  SpO2: 100% (11-22-23 @ 11:27)  Wt(kg): --    I and O's:    11-21 @ 07:01  -  11-22 @ 07:00  --------------------------------------------------------  IN: 330.1 mL / OUT: 0 mL / NET: 330.1 mL    11-22 @ 07:01  -  11-22 @ 14:29  --------------------------------------------------------  IN: 105 mL / OUT: 0 mL / NET: 105 mL          PHYSICAL EXAM:  Constitutional: critically ill, trach to vent   HEENT: + NGT, + tracheostomy   Respiratory: coarse BS  Cardiovascular: tachy s1s2  Gastrointestinal: BS+, soft, NT/ND  Extremities: ++ generalized edema  : No Wheeler  Skin: No rashes  Access: [facility]     LABS:                        8.9    29.11 )-----------( 536      ( 22 Nov 2023 00:30 )             31.3     11-22    133<L>  |  98  |  53<H>  ----------------------------<  181<H>  4.8   |  25  |  2.28<H>    Ca    8.7      22 Nov 2023 00:30  Phos  5.4     11-22  Mg     2.50     11-22    TPro  5.9<L>  /  Alb  1.8<L>  /  TBili  <0.2  /  DBili  x   /  AST  38<H>  /  ALT  20  /  AlkPhos  382<H>  11-22          Urine Studies:  Urinalysis Basic - ( 22 Nov 2023 00:30 )    Color: x / Appearance: x / SG: x / pH: x  Gluc: 181 mg/dL / Ketone: x  / Bili: x / Urobili: x   Blood: x / Protein: x / Nitrite: x   Leuk Esterase: x / RBC: x / WBC x   Sq Epi: x / Non Sq Epi: x / Bacteria: x        Assessment and Plan:   · Assessment	    IMPRESSION: 73F w/ HTN, DM2, CVA, breast CA-bilateral mastectomy, recurrent aspiration pneumonia/respiratory failure, and CKD, 8/11/23 p/w acute hypercapnic respiratory failure; c/b RUSS    (1)Renal - RUSS on CKD4 ==>newly ESRD-HD as of this admission. Last dialyzed Friday     (2)CV- tenuous hemodynamics - intermittently requiring pressor gtt/on q6h Midodrine     (3)Pulm- trach/vent-dependent    (4)Anemia- on Epogen with HD    (5)GOC - s/p repeated discussions with family regarding GOC - family persisting with interest in aggressive measures.     RECOMMEND:  (1)Dialysis today 3hrs, 2.4 kg UF  (2)Epo with HD    Tere Arauz NP  [facility]  [phone]      [facility] [phone]        Patient seen and examined tunneled catheter postponed by IR due to leukocytosis. Shiley placed at bedside this afternoon, awaiting xray for confirmation.   She remains on vasopressor support.       MEDICATIONS  (STANDING):  albuterol/ipratropium for Nebulization 3 milliLiter(s) Nebulizer every 6 hours  artificial tears (preservative free) Ophthalmic Solution 1 Drop(s) Both EYES every 4 hours  aspirin  chewable 81 milliGRAM(s) Enteral Tube daily  atorvastatin 10 milliGRAM(s) Oral at bedtime  calamine/zinc oxide Lotion 1 Application(s) Topical daily  chlorhexidine 0.12% Liquid 15 milliLiter(s) Oral Mucosa every 12 hours  chlorhexidine 2% Cloths 1 Application(s) Topical daily  chlorhexidine 4% Liquid 1 Application(s) Topical <User Schedule>  dextrose 5%. 1000 milliLiter(s) (100 mL/Hr) IV Continuous <Continuous>  dextrose 5%. 1000 milliLiter(s) (50 mL/Hr) IV Continuous <Continuous>  dextrose 50% Injectable 25 Gram(s) IV Push once  dextrose 50% Injectable 25 Gram(s) IV Push once  dextrose 50% Injectable 25 Gram(s) IV Push once  dextrose 50% Injectable 12.5 Gram(s) IV Push once  droxidopa 600 milliGRAM(s) Oral every 8 hours  epoetin odalis (EPOGEN) Injectable 90870 Unit(s) IV Push <User Schedule>  ferrous    sulfate Liquid 300 milliGRAM(s) Oral daily  glucagon  Injectable 1 milliGRAM(s) IntraMuscular once  heparin   Injectable 5000 Unit(s) SubCutaneous every 8 hours  insulin lispro (ADMELOG) corrective regimen sliding scale   SubCutaneous every 6 hours  insulin NPH human recombinant 6 Unit(s) SubCutaneous every 6 hours  midodrine. 40 milliGRAM(s) Oral <User Schedule>  norepinephrine Infusion 0.05 MICROgram(s)/kG/Min (6.23 mL/Hr) IV Continuous <Continuous>  pantoprazole  Injectable 40 milliGRAM(s) IV Push two times a day  petrolatum Ophthalmic Ointment 1 Application(s) Both EYES four times a day  sodium chloride 1 Gram(s) Oral two times a day  sodium chloride 3%  Inhalation 4 milliLiter(s) Inhalation every 6 hours      VITAL:  T(C): , Max: 37.3 (11-21-23 @ 16:00)  T(F): , Max: 99.2 (11-21-23 @ 16:00)  HR: 102 (11-22-23 @ 11:27)  BP: --  BP(mean): --  RR: 24 (11-22-23 @ 10:00)  SpO2: 100% (11-22-23 @ 11:27)  Wt(kg): --    I and O's:    11-21 @ 07:01  -  11-22 @ 07:00  --------------------------------------------------------  IN: 330.1 mL / OUT: 0 mL / NET: 330.1 mL    11-22 @ 07:01  -  11-22 @ 14:29  --------------------------------------------------------  IN: 105 mL / OUT: 0 mL / NET: 105 mL          PHYSICAL EXAM:  Constitutional: critically ill, trach to vent   HEENT: + NGT, + tracheostomy   Respiratory: coarse BS  Cardiovascular: tachy s1s2  Gastrointestinal: BS+, soft, NT/ND  Extremities: ++ generalized edema  : No Wheeler  Skin: No rashes  Access: [facility]     LABS:                        8.9    29.11 )-----------( 536      ( 22 Nov 2023 00:30 )             31.3     11-22    133<L>  |  98  |  53<H>  ----------------------------<  181<H>  4.8   |  25  |  2.28<H>    Ca    8.7      22 Nov 2023 00:30  Phos  5.4     11-22  Mg     2.50     11-22    TPro  5.9<L>  /  Alb  1.8<L>  /  TBili  <0.2  /  DBili  x   /  AST  38<H>  /  ALT  20  /  AlkPhos  382<H>  11-22          Urine Studies:  Urinalysis Basic - ( 22 Nov 2023 00:30 )    Color: x / Appearance: x / SG: x / pH: x  Gluc: 181 mg/dL / Ketone: x  / Bili: x / Urobili: x   Blood: x / Protein: x / Nitrite: x   Leuk Esterase: x / RBC: x / WBC x   Sq Epi: x / Non Sq Epi: x / Bacteria: x        Assessment and Plan:   · Assessment	    IMPRESSION: 73F w/ HTN, DM2, CVA, breast CA-bilateral mastectomy, recurrent aspiration pneumonia/respiratory failure, and CKD, 8/11/23 p/w acute hypercapnic respiratory failure; c/b RUSS    (1)Renal - RUSS on CKD4 ==>newly ESRD-HD as of this admission. Last dialyzed Friday     (2)CV- tenuous hemodynamics - intermittently requiring pressor gtt/on q6h Midodrine     (3)Pulm- trach/vent-dependent    (4)Anemia- on Epogen with HD    (5)GOC - s/p repeated discussions with family regarding GOC - family persisting with interest in aggressive measures.     RECOMMEND:  (1)Dialysis today 3hrs, 2.4 kg UF  (2)Epo with HD    Tere Arauz NP  [facility]  [phone]       RENAL ATTENDING NOTE  Patient seen and examined with NP. Agree with assessment and plan as above. S/p SHAKIRA odom placement today; awaiting HD armida Colon MD  [facility]  [phone]

## 2023-11-22 NOTE — CHART NOTE - NSCHARTNOTEFT_GEN_A_CORE
Patient scheduled for tunneled HD catheter placement in IR. Patient is critically ill, requiring ICU level care.     WBC count noted to be 29 today, up from 21 yesterday. Given rising leukocytosis, patient is not a candidate for a tunneled HD catheter.     Would recommend bedside nontunneled HD catheter.     Will d/c order. Please reconsult as need.     Discussed with MICU fellow. Patient scheduled for tunneled HD catheter placement in IR. Patient is critically ill, requiring ICU level care.     WBC count noted to be 29 today, up from 21 yesterday. Blood cultures pending. Given rising leukocytosis, patient is not a candidate for a tunneled HD catheter.     Would recommend bedside nontunneled HD catheter.     Will d/c order. Please reconsult as need.     Discussed with MICU fellow.

## 2023-11-22 NOTE — PROGRESS NOTE ADULT - SUBJECTIVE AND OBJECTIVE BOX
S:  Pt seen and examined. Neurologically unchanged, getting Shiley today       Medications: MEDICATIONS  (STANDING):  albuterol/ipratropium for Nebulization 3 milliLiter(s) Nebulizer every 6 hours  artificial tears (preservative free) Ophthalmic Solution 1 Drop(s) Both EYES every 4 hours  aspirin  chewable 81 milliGRAM(s) Enteral Tube daily  atorvastatin 10 milliGRAM(s) Oral at bedtime  calamine/zinc oxide Lotion 1 Application(s) Topical daily  chlorhexidine 0.12% Liquid 15 milliLiter(s) Oral Mucosa every 12 hours  chlorhexidine 2% Cloths 1 Application(s) Topical daily  chlorhexidine 4% Liquid 1 Application(s) Topical <User Schedule>  dextrose 5%. 1000 milliLiter(s) (100 mL/Hr) IV Continuous <Continuous>  dextrose 5%. 1000 milliLiter(s) (50 mL/Hr) IV Continuous <Continuous>  dextrose 50% Injectable 25 Gram(s) IV Push once  dextrose 50% Injectable 12.5 Gram(s) IV Push once  dextrose 50% Injectable 25 Gram(s) IV Push once  dextrose 50% Injectable 25 Gram(s) IV Push once  droxidopa 600 milliGRAM(s) Oral every 8 hours  epoetin odalis (EPOGEN) Injectable 61395 Unit(s) IV Push once  epoetin odalis (EPOGEN) Injectable 74435 Unit(s) IV Push <User Schedule>  ferrous    sulfate Liquid 300 milliGRAM(s) Oral daily  glucagon  Injectable 1 milliGRAM(s) IntraMuscular once  heparin   Injectable 5000 Unit(s) SubCutaneous every 8 hours  insulin lispro (ADMELOG) corrective regimen sliding scale   SubCutaneous every 6 hours  insulin NPH human recombinant 6 Unit(s) SubCutaneous every 6 hours  midodrine. 40 milliGRAM(s) Oral <User Schedule>  norepinephrine Infusion 0.05 MICROgram(s)/kG/Min (6.23 mL/Hr) IV Continuous <Continuous>  pantoprazole  Injectable 40 milliGRAM(s) IV Push two times a day  petrolatum Ophthalmic Ointment 1 Application(s) Both EYES four times a day  sodium chloride 1 Gram(s) Oral two times a day  sodium chloride 3%  Inhalation 4 milliLiter(s) Inhalation every 6 hours    MEDICATIONS  (PRN):  acetaminophen   Oral Liquid .. 650 milliGRAM(s) Oral every 6 hours PRN Temp greater or equal to 38C (100.4F), Mild Pain (1 - 3)  dextrose Oral Gel 15 Gram(s) Oral once PRN Blood Glucose LESS THAN 70 milliGRAM(s)/deciliter  diphenhydrAMINE Elixir 50 milliGRAM(s) Oral once PRN Rash and/or Itching  droxidopa 600 milliGRAM(s) Oral <User Schedule> PRN prior to hemodialysis  midodrine. 40 milliGRAM(s) Oral once PRN 1 Hour Pre HD  sodium chloride 0.9% Bolus. 250 milliLiter(s) IV Bolus every 5 minutes PRN SBP LESS THAN or EQUAL to 90 mmHg  sodium chloride 0.9% lock flush 10 milliLiter(s) IV Push every 1 hour PRN Pre/post blood products, medications, blood draw, and to maintain line patency       Vitals:  Vital Signs Last 24 Hrs  T(C): 36.7 (22 Nov 2023 12:00), Max: 36.7 (22 Nov 2023 00:00)  T(F): 98 (22 Nov 2023 12:00), Max: 98.1 (22 Nov 2023 00:00)  HR: 102 (22 Nov 2023 16:25) (98 - 110)  BP: --  BP(mean): --  RR: 24 (22 Nov 2023 15:00) (13 - 24)  SpO2: 100% (22 Nov 2023 16:25) (96% - 100%)    Parameters below as of 22 Nov 2023 16:23  Patient On (Oxygen Delivery Method): ventilator        LABS:                          8.9    29.11 )-----------( 536      ( 22 Nov 2023 00:30 )             31.3     11-22    133<L>  |  98  |  53<H>  ----------------------------<  181<H>  4.8   |  25  |  2.28<H>    Ca    8.7      22 Nov 2023 00:30  Phos  5.4     11-22  Mg     2.50     11-22    TPro  5.9<L>  /  Alb  1.8<L>  /  TBili  <0.2  /  DBili  x   /  AST  38<H>  /  ALT  20  /  AlkPhos  382<H>  11-22    LIVER FUNCTIONS - ( 22 Nov 2023 00:30 )  Alb: 1.8 g/dL / Pro: 5.9 g/dL / ALK PHOS: 382 U/L / ALT: 20 U/L / AST: 38 U/L / GGT: x           PT/INR - ( 22 Nov 2023 00:30 )   PT: 12.3 sec;   INR: 1.10 ratio         PTT - ( 22 Nov 2023 00:30 )  PTT:32.1 sec  Urinalysis Basic - ( 22 Nov 2023 00:30 )    Color: x / Appearance: x / SG: x / pH: x  Gluc: 181 mg/dL / Ketone: x  / Bili: x / Urobili: x   Blood: x / Protein: x / Nitrite: x   Leuk Esterase: x / RBC: x / WBC x   Sq Epi: x / Non Sq Epi: x / Bacteria: x        Neurological Exam:  Mental Status: Eyes closed, minimal spont eye opening off sedation. Does not follow commands,  + trach to vent and NGT   Cranial Nerves: PERRL slightly sluggish, R facial twitching noted by mouth lessened today- ? suppressible. occasional head dystonia  Motor: Moves upper extremities spontaneously R >L, tremor R > L  no movement noted in lowers  Sensation: WD to noxious x4    I personally reviewed the below data/images/labs:        < from: CT Head No Cont (08.15.23 @ 17:20) >    ACC: [id number] EXAM:  CT BRAIN   ORDERED BY: MINAL SAM     PROCEDURE DATE:  08/15/2023          INTERPRETATION:  Clinical indication: Change in neuro exam.    Multiple axial sections were performed from base to vertex without   contrast enhancement. Coronal and sagittal reconstructions were   reformatted well.    This exam is compared prior head CT performed on August 11, 2023    Parenchymal volume loss and chronic microvessel ischemic changes are   again seen.    Abnormal low-attenuation involving the left occipital cortical   subcortical region is again seen. 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    No evidence of acute hemorrhage mass or mass effect is seen.    Evaluation of the osseous structures with appropriate window demonstrates   sclerotic changes about the left mastoid region which appears stable.   Opacification left middle ear region is again seen.    Patient is status post bilateral cataract surgery.    Impression: Stable exam.    < end of copied text >    vEEG:    Clinical Impression:  - Severe diffuse non-specific cerebral dysfunction  - There were no epileptiform abnormalities or seizures recorded.        CTH 8/11:    VENTRICLES AND SULCI: Age-appropriate involutional change  INTRA-AXIAL:  Old left PCA infarct as seen on the prior unchanged.   Microvascular ischemic changes involving the periventricular and   subcortical white matter as seen previously  EXTRA-AXIAL:  No mass or collection is seen.  VISUALIZED SINUSES:  Clear.  VISUALIZED MASTOIDS: Left mastoid sclerosis  CALVARIUM: Infiltrative appearance tothe calvarium may be indicative of   marrow infiltration on the basis of patient's known diagnosis of breast   cancer. MISCELLANEOUS:  None.    IMPRESSION:  No significant interval change compared with 7/17/2023 in   left PCA infarct which is old. Microvascular ischemic changes involving   the periventricular and subcortical white matter as seen   previously.Questionable lesions at the level of the calvarium related to   possible breast CA. Clinical correlation recommended.    --- End of Report ---      ct< from: CT Head No Cont (09.26.23 @ 21:02) >  IMPRESSION: Vague questionable areas of hypoattenuation within the   bilateral cerebellar hemispheres which may be compatible with   acute/subacute ischemia. Recommend further evaluation with a brain MRI   study, provided there are no MRI contraindications.    No acute intracranial hemorrhage.    Previously seen questionable vague wedge-shaped area of hypoattenuation   in the left parietal lobe with artifactual secondary to motion and volume   averaging with a prominent sulcus.    Chronic left occipital lobe infarct.    < end of copied text >    < from: CT Head No Cont (10.03.23 @ 20:46) >    IMPRESSION:    No acute intracranial hemorrhage or mass effect. Evaluation of the   posterior fossa degraded by streak artifact from dental amalgam.   Lucencies in the bilateral cerebellum may be artifactual.    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    Bilateral middle ear and mastoid effusions which are also seen on prior   exam.    EEG Classification / Summary:  Abnormal EEG in the awake, drowsy states.   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   -No seizures

## 2023-11-22 NOTE — PROGRESS NOTE ADULT - SUBJECTIVE AND OBJECTIVE BOX
Follow Up:      Inverval History/ROS:Patient is a 73y old  Female who presents with a chief complaint of Respiratory distress (22 Nov 2023 14:29)  Trach on vent  No fever      Allergies    isoniazid (Rash)  nafcillin (Unknown)  hydrALAZINE (Rash)  vitamin E (Short breath; Urticaria; Hives)  doxycycline (Rash)  cefepime (Rash)  NIFEdipine (Urticaria; Hives)  Zosyn (Rash)    Intolerances        ANTIMICROBIALS:      OTHER MEDS:  acetaminophen   Oral Liquid .. 650 milliGRAM(s) Oral every 6 hours PRN  albuterol/ipratropium for Nebulization 3 milliLiter(s) Nebulizer every 6 hours  artificial tears (preservative free) Ophthalmic Solution 1 Drop(s) Both EYES every 4 hours  aspirin  chewable 81 milliGRAM(s) Enteral Tube daily  atorvastatin 10 milliGRAM(s) Oral at bedtime  calamine/zinc oxide Lotion 1 Application(s) Topical daily  chlorhexidine 0.12% Liquid 15 milliLiter(s) Oral Mucosa every 12 hours  chlorhexidine 2% Cloths 1 Application(s) Topical daily  chlorhexidine 4% Liquid 1 Application(s) Topical <User Schedule>  dextrose 5%. 1000 milliLiter(s) IV Continuous <Continuous>  dextrose 5%. 1000 milliLiter(s) IV Continuous <Continuous>  dextrose 50% Injectable 12.5 Gram(s) IV Push once  dextrose 50% Injectable 25 Gram(s) IV Push once  dextrose 50% Injectable 25 Gram(s) IV Push once  dextrose 50% Injectable 25 Gram(s) IV Push once  dextrose Oral Gel 15 Gram(s) Oral once PRN  diphenhydrAMINE Elixir 50 milliGRAM(s) Oral once PRN  droxidopa 600 milliGRAM(s) Oral <User Schedule> PRN  droxidopa 600 milliGRAM(s) Oral every 8 hours  epoetin odalis (EPOGEN) Injectable 59251 Unit(s) IV Push once  epoetin odalis (EPOGEN) Injectable 57423 Unit(s) IV Push <User Schedule>  ferrous    sulfate Liquid 300 milliGRAM(s) Oral daily  glucagon  Injectable 1 milliGRAM(s) IntraMuscular once  heparin   Injectable 5000 Unit(s) SubCutaneous every 8 hours  insulin lispro (ADMELOG) corrective regimen sliding scale   SubCutaneous every 6 hours  insulin NPH human recombinant 6 Unit(s) SubCutaneous every 6 hours  midodrine. 40 milliGRAM(s) Oral <User Schedule>  midodrine. 40 milliGRAM(s) Oral once PRN  norepinephrine Infusion 0.05 MICROgram(s)/kG/Min IV Continuous <Continuous>  pantoprazole  Injectable 40 milliGRAM(s) IV Push two times a day  petrolatum Ophthalmic Ointment 1 Application(s) Both EYES four times a day  sodium chloride 1 Gram(s) Oral two times a day  sodium chloride 0.9% Bolus. 250 milliLiter(s) IV Bolus every 5 minutes PRN  sodium chloride 0.9% lock flush 10 milliLiter(s) IV Push every 1 hour PRN  sodium chloride 3%  Inhalation 4 milliLiter(s) Inhalation every 6 hours      Vital Signs Last 24 Hrs  T(C): 36.7 (22 Nov 2023 12:00), Max: 36.7 (22 Nov 2023 00:00)  T(F): 98 (22 Nov 2023 12:00), Max: 98.1 (22 Nov 2023 00:00)  HR: 102 (22 Nov 2023 16:25) (98 - 110)  BP: --  BP(mean): --  RR: 24 (22 Nov 2023 15:00) (13 - 24)  SpO2: 100% (22 Nov 2023 16:25) (96% - 100%)    Parameters below as of 22 Nov 2023 16:23  Patient On (Oxygen Delivery Method): ventilator        PHYSICAL EXAM:  General: x[ x] trach  HEAD/EYES: [ ] PERRL [x ] white sclera [ ] icterus  ENT:  [ ] normal [x ] supple [ ] thrush [ ] pharyngeal exudate  Cardiovascular:   [ ] murmur [ ] normal [ ] PPM/AICD  Respiratory:  [x ] clear to ausculation bilaterally  GI:  [x ] soft, non-tender, normal bowel sounds  :  [ ] willard [ ] no CVA tenderness   Musculoskeletal:  [x ] no synovitis  Neurologic:  [ ] non-focal exam   Skin:  x[ ] no rash  Lymph: x[ ] no lymphadenopathy  Psychiatric:  [ ] appropriate affect [ ] alert & oriented  Lines:  [ x] no phlebitis [ ] central line                                8.9    29.11 )-----------( 536      ( 22 Nov 2023 00:30 )             31.3       11-22    133<L>  |  98  |  53<H>  ----------------------------<  181<H>  4.8   |  25  |  2.28<H>    Ca    8.7      22 Nov 2023 00:30  Phos  5.4     11-22  Mg     2.50     11-22    TPro  5.9<L>  /  Alb  1.8<L>  /  TBili  <0.2  /  DBili  x   /  AST  38<H>  /  ALT  20  /  AlkPhos  382<H>  11-22      Urinalysis Basic - ( 22 Nov 2023 00:30 )    Color: x / Appearance: x / SG: x / pH: x  Gluc: 181 mg/dL / Ketone: x  / Bili: x / Urobili: x   Blood: x / Protein: x / Nitrite: x   Leuk Esterase: x / RBC: x / WBC x   Sq Epi: x / Non Sq Epi: x / Bacteria: x        MICROBIOLOGY:Culture Results:   Culture is being performed. (11-16-23 @ 23:59)      RADIOLOGY:

## 2023-11-23 NOTE — PROGRESS NOTE ADULT - SUBJECTIVE AND OBJECTIVE BOX
INTERVAL HPI/OVERNIGHT EVENTS:    SUBJECTIVE: Patient seen and examined at bedside. Overnight pressures dropped while receiving dialysis, had levophed increased. This morning pressures improved, now off pressors. Mentation unchanged.     ROS: All negative except as listed above.    VITAL SIGNS:  ICU Vital Signs Last 24 Hrs  T(C): 36.4 (23 Nov 2023 12:00), Max: 37.2 (23 Nov 2023 04:00)  T(F): 97.6 (23 Nov 2023 12:00), Max: 99 (23 Nov 2023 04:00)  HR: 117 (23 Nov 2023 12:00) (100 - 126)  BP: --  BP(mean): --  ABP: 126/44 (23 Nov 2023 12:00) (46/17 - 140/39)  ABP(mean): 75 (23 Nov 2023 12:00) (27 - 78)  RR: 25 (23 Nov 2023 12:00) (9 - 25)  SpO2: 98% (23 Nov 2023 12:00) (94% - 100%)    O2 Parameters below as of 23 Nov 2023 12:00  Patient On (Oxygen Delivery Method): ventilator    O2 Concentration (%): 40      Mode: AC/ CMV (Assist Control/ Continuous Mandatory Ventilation), RR (machine): 24, FiO2: 40, PEEP: 8, ITime: 0.8, MAP: 19, PC: 35, PIP: 44  Plateau pressure:   P/F ratio:     11-22 @ 07:01  -  11-23 @ 07:00  --------------------------------------------------------  IN: 1511.6 mL / OUT: 2300 mL / NET: -788.4 mL    11-23 @ 07:01  -  11-23 @ 12:23  --------------------------------------------------------  IN: 180 mL / OUT: 0 mL / NET: 180 mL      CAPILLARY BLOOD GLUCOSE      POCT Blood Glucose.: 108 mg/dL (23 Nov 2023 05:33)      ECG: reviewed.    PHYSICAL EXAM:    CONSTITUTIONAL: Trached, critically ill    RESPIRATORY: +vent and coarse breath sounds auscultated  CARDIOVASCULAR: Tachycardic and regular rhythm, normal S1 and S2, no murmur/rub/gallop;  Peripheral pulses are 2+ bilaterally  ABDOMEN: +NGT. distended but soft. Nontender to palpation, normoactive bowel sounds, no rebound/guarding; No hepatosplenomegaly. Rectal tube w/ black output  MSK: no clubbing or cyanosis of digits; no joint swelling or tenderness to palpation  Extremities: Anasarca. 2+ BLE edema to knees, 1+ edema of bilateral thighs. 3+ edema of bilateral forearms  Neuro: Does not respond to verbal or physical stimuli. Does not follow commands  Skin: small vesicular rash to right lower abdomen    MEDICATIONS:  MEDICATIONS  (STANDING):  albuterol/ipratropium for Nebulization 3 milliLiter(s) Nebulizer every 6 hours  artificial tears (preservative free) Ophthalmic Solution 1 Drop(s) Both EYES every 4 hours  aspirin  chewable 81 milliGRAM(s) Enteral Tube daily  atorvastatin 10 milliGRAM(s) Oral at bedtime  calamine/zinc oxide Lotion 1 Application(s) Topical daily  chlorhexidine 0.12% Liquid 15 milliLiter(s) Oral Mucosa every 12 hours  chlorhexidine 2% Cloths 1 Application(s) Topical daily  chlorhexidine 4% Liquid 1 Application(s) Topical <User Schedule>  dextrose 5%. 1000 milliLiter(s) (100 mL/Hr) IV Continuous <Continuous>  dextrose 5%. 1000 milliLiter(s) (50 mL/Hr) IV Continuous <Continuous>  dextrose 50% Injectable 12.5 Gram(s) IV Push once  dextrose 50% Injectable 25 Gram(s) IV Push once  dextrose 50% Injectable 25 Gram(s) IV Push once  dextrose 50% Injectable 25 Gram(s) IV Push once  droxidopa 600 milliGRAM(s) Oral every 8 hours  epoetin odalis (EPOGEN) Injectable 85884 Unit(s) IV Push <User Schedule>  ferrous    sulfate Liquid 300 milliGRAM(s) Oral daily  glucagon  Injectable 1 milliGRAM(s) IntraMuscular once  heparin   Injectable 5000 Unit(s) SubCutaneous every 8 hours  insulin lispro (ADMELOG) corrective regimen sliding scale   SubCutaneous every 6 hours  insulin NPH human recombinant 6 Unit(s) SubCutaneous every 6 hours  midodrine. 40 milliGRAM(s) Oral <User Schedule>  norepinephrine Infusion 0.05 MICROgram(s)/kG/Min (6.23 mL/Hr) IV Continuous <Continuous>  pantoprazole  Injectable 40 milliGRAM(s) IV Push two times a day  petrolatum Ophthalmic Ointment 1 Application(s) Both EYES four times a day  sodium chloride 1 Gram(s) Oral two times a day  sodium chloride 3%  Inhalation 4 milliLiter(s) Inhalation every 6 hours    MEDICATIONS  (PRN):  acetaminophen   Oral Liquid .. 650 milliGRAM(s) Oral every 6 hours PRN Temp greater or equal to 38C (100.4F), Mild Pain (1 - 3)  dextrose Oral Gel 15 Gram(s) Oral once PRN Blood Glucose LESS THAN 70 milliGRAM(s)/deciliter  diphenhydrAMINE Elixir 50 milliGRAM(s) Oral once PRN Rash and/or Itching  droxidopa 600 milliGRAM(s) Oral <User Schedule> PRN prior to hemodialysis  midodrine. 40 milliGRAM(s) Oral once PRN 1 Hour Pre HD  sodium chloride 0.9% Bolus. 250 milliLiter(s) IV Bolus every 5 minutes PRN SBP LESS THAN or EQUAL to 90 mmHg  sodium chloride 0.9% lock flush 10 milliLiter(s) IV Push every 1 hour PRN Pre/post blood products, medications, blood draw, and to maintain line patency      ALLERGIES:  Allergies    isoniazid (Rash)  nafcillin (Unknown)  hydrALAZINE (Rash)  vitamin E (Short breath; Urticaria; Hives)  doxycycline (Rash)  cefepime (Rash)  NIFEdipine (Urticaria; Hives)  Zosyn (Rash)    Intolerances        LABS:                        8.8    30.48 )-----------( 459      ( 23 Nov 2023 00:30 )             30.3     11-23    135  |  98  |  34<H>  ----------------------------<  166<H>  3.8   |  25  |  1.64<H>    Ca    8.5      23 Nov 2023 00:30  Phos  3.5     11-23  Mg     2.10     11-23    TPro  6.0  /  Alb  1.7<L>  /  TBili  <0.2  /  DBili  x   /  AST  42<H>  /  ALT  22  /  AlkPhos  417<H>  11-23    PT/INR - ( 22 Nov 2023 00:30 )   PT: 12.3 sec;   INR: 1.10 ratio         PTT - ( 22 Nov 2023 00:30 )  PTT:32.1 sec  Urinalysis Basic - ( 23 Nov 2023 00:30 )    Color: x / Appearance: x / SG: x / pH: x  Gluc: 166 mg/dL / Ketone: x  / Bili: x / Urobili: x   Blood: x / Protein: x / Nitrite: x   Leuk Esterase: x / RBC: x / WBC x   Sq Epi: x / Non Sq Epi: x / Bacteria: x      ABG:      vBG:    Micro:    Culture - Blood (collected 11-02-23 @ 09:10)  Source: .Blood Blood-Peripheral  Final Report (11-07-23 @ 12:01):    No growth at 5 days    Culture - Blood (collected 11-02-23 @ 09:00)  Source: .Blood Blood-Peripheral  Final Report (11-07-23 @ 12:01):    No growth at 5 days        Culture - Sputum (collected 11-02-23 @ 10:24)  Source: ET Tube ET Tube  Gram Stain (11-02-23 @ 15:31):    Moderate polymorphonuclear leukocytes per low power field    No Squamous epithelial cells per low power field    Moderate Gram Negative Rods per oil power field  Final Report (11-04-23 @ 22:56):    Numerous Pseudomonas aeruginosa (Carbapenem Resistant)  Organism: Pseudomonas aeruginosa (Carbapenem Resistant) (11-04-23 @ 22:56)  Organism: Pseudomonas aeruginosa (Carbapenem Resistant) (11-04-23 @ 22:56)      Method Type: CarbaR      -  Resistance Gene to Carbapenem: Nondet  Organism: Pseudomonas aeruginosa (Carbapenem Resistant) (11-04-23 @ 22:56)      Method Type: SIMON      -  Amikacin: S <=16      -  Aztreonam: R >16      -  Cefepime: I 16      -  Ceftazidime: I 16      -  Ceftazidime/Avibactam: S <=4      -  Ceftolozane/tazobactam: S <=2      -  Ciprofloxacin: R >2      -  Gentamicin: R >8      -  Imipenem: R >8      -  Levofloxacin: R >4      -  Meropenem: R >8      -  Piperacillin/Tazobactam: R >64      -  Tobramycin: R >8        RADIOLOGY & ADDITIONAL TESTS: Reviewed.

## 2023-11-23 NOTE — PROGRESS NOTE ADULT - SUBJECTIVE AND OBJECTIVE BOX
Patient is a 73y old  Female who presents with a chief complaint of Respiratory distress (23 Nov 2023 09:45)      SUBJECTIVE / OVERNIGHT EVENTS: cxr post R shiley placement on 11/22: Large Right pl effusion. wbc rising to 30K, remains afebrile, HD as per renal    MEDICATIONS  (STANDING):  albuterol/ipratropium for Nebulization 3 milliLiter(s) Nebulizer every 6 hours  artificial tears (preservative free) Ophthalmic Solution 1 Drop(s) Both EYES every 4 hours  aspirin  chewable 81 milliGRAM(s) Enteral Tube daily  atorvastatin 10 milliGRAM(s) Oral at bedtime  calamine/zinc oxide Lotion 1 Application(s) Topical daily  chlorhexidine 0.12% Liquid 15 milliLiter(s) Oral Mucosa every 12 hours  chlorhexidine 2% Cloths 1 Application(s) Topical daily  chlorhexidine 4% Liquid 1 Application(s) Topical <User Schedule>  dextrose 5%. 1000 milliLiter(s) (100 mL/Hr) IV Continuous <Continuous>  dextrose 5%. 1000 milliLiter(s) (50 mL/Hr) IV Continuous <Continuous>  dextrose 50% Injectable 25 Gram(s) IV Push once  dextrose 50% Injectable 25 Gram(s) IV Push once  dextrose 50% Injectable 25 Gram(s) IV Push once  dextrose 50% Injectable 12.5 Gram(s) IV Push once  droxidopa 600 milliGRAM(s) Oral every 8 hours  epoetin odalis (EPOGEN) Injectable 07596 Unit(s) IV Push <User Schedule>  ferrous    sulfate Liquid 300 milliGRAM(s) Oral daily  glucagon  Injectable 1 milliGRAM(s) IntraMuscular once  heparin   Injectable 5000 Unit(s) SubCutaneous every 8 hours  insulin lispro (ADMELOG) corrective regimen sliding scale   SubCutaneous every 6 hours  insulin NPH human recombinant 6 Unit(s) SubCutaneous every 6 hours  midodrine. 40 milliGRAM(s) Oral <User Schedule>  norepinephrine Infusion 0.05 MICROgram(s)/kG/Min (6.23 mL/Hr) IV Continuous <Continuous>  pantoprazole  Injectable 40 milliGRAM(s) IV Push two times a day  petrolatum Ophthalmic Ointment 1 Application(s) Both EYES four times a day  sodium chloride 1 Gram(s) Oral two times a day  sodium chloride 3%  Inhalation 4 milliLiter(s) Inhalation every 6 hours    MEDICATIONS  (PRN):  acetaminophen   Oral Liquid .. 650 milliGRAM(s) Oral every 6 hours PRN Temp greater or equal to 38C (100.4F), Mild Pain (1 - 3)  dextrose Oral Gel 15 Gram(s) Oral once PRN Blood Glucose LESS THAN 70 milliGRAM(s)/deciliter  diphenhydrAMINE Elixir 50 milliGRAM(s) Oral once PRN Rash and/or Itching  droxidopa 600 milliGRAM(s) Oral <User Schedule> PRN prior to hemodialysis  midodrine. 40 milliGRAM(s) Oral once PRN 1 Hour Pre HD  sodium chloride 0.9% Bolus. 250 milliLiter(s) IV Bolus every 5 minutes PRN SBP LESS THAN or EQUAL to 90 mmHg  sodium chloride 0.9% lock flush 10 milliLiter(s) IV Push every 1 hour PRN Pre/post blood products, medications, blood draw, and to maintain line patency      Vital Signs Last 24 Hrs  T(F): 97.6 (11-23-23 @ 12:00), Max: 99 (11-23-23 @ 04:00)  HR: 117 (11-23-23 @ 12:00) (100 - 126)  BP: --  RR: 25 (11-23-23 @ 12:00) (9 - 25)  SpO2: 98% (11-23-23 @ 12:00) (94% - 100%)  Telemetry:   CAPILLARY BLOOD GLUCOSE      POCT Blood Glucose.: 81 mg/dL (23 Nov 2023 12:46)  POCT Blood Glucose.: 108 mg/dL (23 Nov 2023 05:33)  POCT Blood Glucose.: 160 mg/dL (23 Nov 2023 00:20)  POCT Blood Glucose.: 158 mg/dL (22 Nov 2023 18:17)    I&O's Summary    22 Nov 2023 07:01  -  23 Nov 2023 07:00  --------------------------------------------------------  IN: 1511.6 mL / OUT: 2300 mL / NET: -788.4 mL    23 Nov 2023 07:01  -  23 Nov 2023 13:09  --------------------------------------------------------  IN: 180 mL / OUT: 0 mL / NET: 180 mL        PHYSICAL EXAM:  GENERAL: NAD, well-developed  HEAD:  Atraumatic, Normocephalic  EYES: EOMI, PERRLA, conjunctiva and sclera clear  NECK: Supple, No JVD  CHEST/LUNG: Clear to auscultation bilaterally; No wheeze  HEART: Regular rate and rhythm; No murmurs, rubs, or gallops  ABDOMEN: Soft, Nontender, Nondistended; Bowel sounds present  EXTREMITIES:  2+ Peripheral Pulses, No clubbing, cyanosis, or edema  PSYCH: AAOx3  NEUROLOGY: non-focal  SKIN: No rashes or lesions    LABS:                        8.8    30.48 )-----------( 459      ( 23 Nov 2023 00:30 )             30.3     11-23    135  |  98  |  34<H>  ----------------------------<  166<H>  3.8   |  25  |  1.64<H>    Ca    8.5      23 Nov 2023 00:30  Phos  3.5     11-23  Mg     2.10     11-23    TPro  6.0  /  Alb  1.7<L>  /  TBili  <0.2  /  DBili  x   /  AST  42<H>  /  ALT  22  /  AlkPhos  417<H>  11-23    PT/INR - ( 22 Nov 2023 00:30 )   PT: 12.3 sec;   INR: 1.10 ratio         PTT - ( 22 Nov 2023 00:30 )  PTT:32.1 sec      Urinalysis Basic - ( 23 Nov 2023 00:30 )    Color: x / Appearance: x / SG: x / pH: x  Gluc: 166 mg/dL / Ketone: x  / Bili: x / Urobili: x   Blood: x / Protein: x / Nitrite: x   Leuk Esterase: x / RBC: x / WBC x   Sq Epi: x / Non Sq Epi: x / Bacteria: x

## 2023-11-23 NOTE — PROGRESS NOTE ADULT - ASSESSMENT
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       NEUROLOGY  #AMS  # Multiple embolic strokes   # L PCA Infarct   MR head performed w/ new infarct and old infarcts, EEG without seizure events but with high foci potential   - EEG given facial twitching: negative for epileptiform activity (10/31)  - deferring repeat MRI as unlikely to   - baseline AOX3 at home per daughter, trached/non-verbal   - c/w ASA 81 mg   - continue Lipitor    CARDIOVASCULAR  # Septic vs vasoplegic shock   Etiology likely septic iso active infection. Possible component of vasoplegic, however no longer with active infection or on sedation. Off IV vasopressors.   Current regimen:   - droxidopa 600mg q8h with PRN 600mg ordered preHD  - midodrine 40mg q6 h; trial of additional 40mg prior to HD  - required levophed up to 0.14 during HD 11/15  weaned off after dialysis session, will need additional midodrine/droxidopa dose preHD  - Levophed 0.05 resumed 11/21 -    # HFpEF w/ decompensation   > ECHO w/ EF 59 with severe LVH and diastolic dysfunction   - fluid overloaded, HD to remove fluids     HEENT   # Left ear OE with acute on chronic left-sided otomastoiditis, s/p Abx (see in ID)   - noted to have white discharged/residue, increased from prior per patient's daughter; ENT reconsulted, resumed on cipro drops (10/31 - 11/7),   # 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   - resolved    RESPIRATORY  # acute hypoxic resp failure second to volume overload, ventilator dependence   # Copious inline and oral thick tanned secretions   - CKD vs HFpEF w/ hypercarbia 2/2 volume overload requiring intubation, trach, and HD initiation  - Continue on albuterol and chest PT, cont 3 % INH, IPV  - Continue volume removal with HD  - hypoxemia w/ turning and repositioning, CXR 11/16 shows moderate right pleural effusion, worsening RUL atelectasis, left has small effusion and/or atelectasis  - Bronchoscopy bedside 11/16 noted with white secretions right > left  - f/u BAL     #tracheomalacia   - CT CHEST (9/8) with tracheomalacia, BL pleural effusions and persistent consolidations     GI  # GIB: last pRBC transfused 11/4 (10/14 before that)   - Continue on PPI BID  - patient had melena before 11/13 but H/H stable,  Eliquis was held for dark brown/black stool  - as per nursing staff, stool is black in the rectal system    # Dysphagia   - NGT- tolerating feeds, at goal- changed to nepro was per nutrition    RENAL  # ESRD, L IJ shiley   - HD MWF TIW with midodrine and droxidopa   - ICU for vasopressor assisted volume removal, cap to 1 pressor and not offering CRRT due to the comorbidities and prognosis   - f/u w/ nephrology: will continue to offer patient 1-pressor assisted HD as agreed prior, as long as she can tolerate HD maxed on 1 pressor, Levo to 0.3; she will be considered iHD candidate  - IR was planning PermCath 11/14; but procedure was cancelled due to pt in MICU, on vasopressors, and has leucocytosis  - [facility] shiley discontinued 11/16 for line holiday, trialysis catheter placed in [facility] on 11/17 however was removed 11/18 iso poor flow rates during HD.  - IR re-consulted for permacath on 11/19- is requiring ID to clear patient given WBC  - ID recs appreciated. Per ID, risk of infection with shiley is higher than with a permcath.  Decision re placement of permcath is a risk benefit decision. Pt may continue to have leukocytosis in setting of clinical condition, likely multifactorial  - Per IR, patient is not a candidate for a tunneled HD catheter at this time given uptrending leukocytosis    INFECTIOUS DISEASE  #Septic shock  , afebrile,  WBC - 15>27, LA wnls  #blood cx 9/1: MSSA with hypotension  repeat negative 9/4, 9/8 s/p 4 weeks of vanco/dapto through 10/2  #Colonized with  pseudomonas   # MSSA/ESBL E Coli PNA  # L otitis Externa s/p ENT debridement c/b Candida, s/p Meropenem and Fluconazole  # Proteus/ Pseudomonas in sputum, s/p Aztreonam   - recent Bcx 11/2 negative, sputum 11/2 with  pseudomonas- colonized   - repeat MRSA PCR neg  - afebrile now after completing course aztreonam 11/6  - monitoring off abx   - f/u BAL (11/16)- only afb was sent which is negative.   - f/u BCx 11/22    # possible malignant otitis externa   # ESBL ECOLI and MSSA VAP c/b MSSA bacteremia   - hx of MSSA bacteremia, ESBL E. Coli sputum Cx, BAL w/ MSSA  - treated with abx   - eosinophilia workup: JORGE, ANCAs negative    HEME  # Anemia second to GIB vs renal disease   - multiple transfusions, last done 11/4  - Eliquis held 2/2 pt having melena, and now pt with black stools/dark brown  - 11/14  restarted ASA 81 mg   - restarted Heparin SQ prophylaxis 11/14 today, will continue to hold off starting ac given melena.    #Allergic transfusion reaction: per RCU documentation, developed erythematous rash across chest shortly after starting transfusion, blood bank consulted and diagnosed mild allergic rxn  - premedicate w/ benadryl and famotidine prior to future transfusions  - no issue w/ pRBC transfusion 11/4    VASCULAR   # LLE POP DVT   - on Eliquis (held as of 11/11 PM for procedure and also patient having melena), stools are black/dark brown  - restarted Heparin SQ prophylaxis 11/14 and will continue to hold off starting Eliquis given melena.  - SCDs    # HD access  - TRISHA TAYLOR (9/27 - 10/21), replaced 10/24 -11/15  - TRISHA taylor discontinued 11/16 for line holiday, trialysis catheter placed in [facility] on 11/17 however was removed 11/18 iso poor flow rates during HD.  - Per IR, patient is not a candidate for a tunneled HD catheter given uptrending leukocytosis    ONC   # Hx of Breast CA   - Patient dx in 2018 and s/p B/L mastectomy (radical on right) and chemo and RT.     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       NEUROLOGY  #AMS  # Multiple embolic strokes   # L PCA Infarct   MR head performed w/ new infarct and old infarcts, EEG without seizure events but with high foci potential   - EEG given facial twitching: negative for epileptiform activity (10/31)  - deferring repeat MRI as unlikely to   - baseline AOX3 at home per daughter, trached/non-verbal   - c/w ASA 81 mg   - continue Lipitor    CARDIOVASCULAR  # Septic vs vasoplegic shock   Etiology likely septic iso active infection. Possible component of vasoplegic, however no longer with active infection or on sedation. Off IV vasopressors.   Current regimen:   - droxidopa 600mg q8h with PRN 600mg ordered preHD  - midodrine 40mg q6 h; trial of additional 40mg prior to HD  - required increased levophed during HD 11/23  weaned off after dialysis session, will need additional midodrine/droxidopa dose preHD      # HFpEF w/ decompensation   > ECHO w/ EF 59 with severe LVH and diastolic dysfunction   - fluid overloaded, HD to remove fluids     HEENT   # Left ear OE with acute on chronic left-sided otomastoiditis, s/p Abx (see in ID)   - noted to have white discharged/residue, increased from prior per patient's daughter; ENT reconsulted, resumed on cipro drops (10/31 - 11/7),   # 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   - resolved    RESPIRATORY  # acute hypoxic resp failure second to volume overload, ventilator dependence   # Copious inline and oral thick tanned secretions   - CKD vs HFpEF w/ hypercarbia 2/2 volume overload requiring intubation, trach, and HD initiation  - Continue on albuterol and chest PT, cont 3 % INH, IPV  - Continue volume removal with HD  - hypoxemia w/ turning and repositioning, CXR 11/16 shows moderate right pleural effusion, worsening RUL atelectasis, left has small effusion and/or atelectasis  - Bronchoscopy bedside 11/16 noted with white secretions right > left  - f/u BAL     #tracheomalacia   - CT CHEST (9/8) with tracheomalacia, BL pleural effusions and persistent consolidations     GI  # GIB: last pRBC transfused 11/4 (10/14 before that)   - Continue on PPI BID  - patient had melena before 11/13 but H/H stable,  Eliquis was held for dark brown/black stool  - as per nursing staff, stool is black in the rectal system    # Dysphagia   - NGT- tolerating feeds, at goal- changed to nepro was per nutrition    RENAL  # ESRD, L ARTHUR taylor   - HD MWF TIW with midodrine and droxidopa   - ICU for vasopressor assisted volume removal, cap to 1 pressor and not offering CRRT due to the comorbidities and prognosis   - f/u w/ nephrology: will continue to offer patient 1-pressor assisted HD as agreed prior, as long as she can tolerate HD maxed on 1 pressor, Levo to 0.3; she will be considered iHD candidate  - IR was planning PermCath 11/14; but procedure was cancelled due to pt in MICU, on vasopressors, and has leucocytosis  - [facility] ilene discontinued 11/16 for line holiday, trialysis catheter placed in [facility] on 11/17 however was removed 11/18 iso poor flow rates during HD.  - Per IR, patient is not a candidate for a tunneled HD catheter at this time given uptrending leukocytosis  - Jagrutiley placed 11/22, used for HD 11/22    INFECTIOUS DISEASE  #Septic shock  , afebrile,  WBC - 15>27, LA wnls  #blood cx 9/1: MSSA with hypotension  repeat negative 9/4, 9/8 s/p 4 weeks of vanco/dapto through 10/2  #Colonized with  pseudomonas   # MSSA/ESBL E Coli PNA  # L otitis Externa s/p ENT debridement c/b Candida, s/p Meropenem and Fluconazole  # Proteus/ Pseudomonas in sputum, s/p Aztreonam   - recent Bcx 11/2 negative, sputum 11/2 with  pseudomonas- colonized   - repeat MRSA PCR neg  - afebrile now after completing course aztreonam 11/6  - monitoring off abx   - f/u BAL (11/16)- only afb was sent which is negative.   - f/u BCx 11/22    # possible malignant otitis externa   # ESBL ECOLI and MSSA VAP c/b MSSA bacteremia   - hx of MSSA bacteremia, ESBL E. Coli sputum Cx, BAL w/ MSSA  - treated with abx   - eosinophilia workup: JORGE, ANCAs negative    HEME  # Anemia second to GIB vs renal disease   - multiple transfusions, last done 11/4  - Eliquis held 2/2 pt having melena, and now pt with black stools/dark brown  - 11/14  restarted ASA 81 mg   - restarted Heparin SQ prophylaxis 11/14 today, will continue to hold off starting ac given melena.    #Allergic transfusion reaction: per RCU documentation, developed erythematous rash across chest shortly after starting transfusion, blood bank consulted and diagnosed mild allergic rxn  - premedicate w/ benadryl and famotidine prior to future transfusions  - no issue w/ pRBC transfusion 11/4    VASCULAR   # LLE POP DVT   - on Eliquis (held as of 11/11 PM for procedure and also patient having melena), stools are black/dark brown  - restarted Heparin SQ prophylaxis 11/14 and will continue to hold off starting Eliquis given melena.  - SCDs    # HD access  - TRISHA TAYLOR (9/27 - 10/21), replaced 10/24 -11/15  - TRISHA taylor discontinued 11/16 for line holiday, trialysis catheter placed in [facility] on 11/17 however was removed 11/18 iso poor flow rates during HD.  - Shiley placed 11/22, used for IR, patient is not a candidate for a tunneled HD catheter given uptrending leukocytosis    ONC   # Hx of Breast CA   - Patient dx in 2018 and s/p B/L mastectomy (radical on right) and chemo and RT.     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       NEUROLOGY  #AMS  # Multiple embolic strokes   # L PCA Infarct   MR head performed w/ new infarct and old infarcts, EEG without seizure events but with high foci potential   - EEG given facial twitching: negative for epileptiform activity (10/31)  - deferring repeat MRI as unlikely to   - baseline AOX3 at home per daughter, trached/non-verbal   - c/w ASA 81 mg   - continue Lipitor    CARDIOVASCULAR  # Septic vs vasoplegic shock   Etiology likely septic iso active infection. Possible component of vasoplegic, however no longer with active infection or on sedation. Off IV vasopressors.   Current regimen:   - droxidopa 600mg q8h with PRN 600mg ordered preHD  - midodrine 40mg q6 h; trial of additional 40mg prior to HD  - required increased levophed during HD 11/23,  weaned off after dialysis session, will need additional midodrine/droxidopa dose preHD      # HFpEF w/ decompensation   > ECHO w/ EF 59 with severe LVH and diastolic dysfunction   - fluid overloaded, HD to remove fluids     HEENT   # Left ear OE with acute on chronic left-sided otomastoiditis, s/p Abx (see in ID)   - noted to have white discharged/residue, increased from prior per patient's daughter; ENT reconsulted, resumed on cipro drops (10/31 - 11/7),   # 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   - resolved    RESPIRATORY  # acute hypoxic resp failure second to volume overload, ventilator dependence   # Copious inline and oral thick tanned secretions   - CKD vs HFpEF w/ hypercarbia 2/2 volume overload requiring intubation, trach, and HD initiation  - Continue on albuterol and chest PT, cont 3 % INH, IPV  - Continue volume removal with HD  - hypoxemia w/ turning and repositioning, CXR 11/16 shows moderate right pleural effusion, worsening RUL atelectasis, left has small effusion and/or atelectasis  - Bronchoscopy bedside 11/16 noted with white secretions right > left  - f/u BAL     #tracheomalacia   - CT CHEST (9/8) with tracheomalacia, BL pleural effusions and persistent consolidations     GI  # GIB: last pRBC transfused 11/4 (10/14 before that)   - Continue on PPI BID  - patient had melena before 11/13 but H/H stable,  Eliquis was held for dark brown/black stool  - as per nursing staff, stool is black in the rectal system    # Dysphagia   - NGT- tolerating feeds, at goal- changed to nepro was per nutrition    RENAL  # ESRD, L ARTHUR taylor   - HD MWF TIW with midodrine and droxidopa   - ICU for vasopressor assisted volume removal, cap to 1 pressor and not offering CRRT due to the comorbidities and prognosis   - f/u w/ nephrology: will continue to offer patient 1-pressor assisted HD as agreed prior, as long as she can tolerate HD maxed on 1 pressor, Levo to 0.3; she will be considered iHD candidate  - IR was planning PermCath 11/14; but procedure was cancelled due to pt in MICU, on vasopressors, and has leucocytosis  - [facility] ilene discontinued 11/16 for line holiday, trialysis catheter placed in [facility] on 11/17 however was removed 11/18 iso poor flow rates during HD.  - Per IR, patient is not a candidate for a tunneled HD catheter at this time given uptrending leukocytosis  - Jagrutiley placed 11/22, used for HD 11/22    INFECTIOUS DISEASE  #Septic shock  , afebrile,  WBC - 15>27, LA wnls  #blood cx 9/1: MSSA with hypotension  repeat negative 9/4, 9/8 s/p 4 weeks of vanco/dapto through 10/2  #Colonized with  pseudomonas   # MSSA/ESBL E Coli PNA  # L otitis Externa s/p ENT debridement c/b Candida, s/p Meropenem and Fluconazole  # Proteus/ Pseudomonas in sputum, s/p Aztreonam   - recent Bcx 11/2 negative, sputum 11/2 with  pseudomonas- colonized   - repeat MRSA PCR neg  - afebrile now after completing course aztreonam 11/6  - monitoring off abx   - f/u BAL (11/16)- only afb was sent which is negative.   - f/u BCx 11/22    # possible malignant otitis externa   # ESBL ECOLI and MSSA VAP c/b MSSA bacteremia   - hx of MSSA bacteremia, ESBL E. Coli sputum Cx, BAL w/ MSSA  - treated with abx   - eosinophilia workup: JORGE, ANCAs negative    HEME  # Anemia second to GIB vs renal disease   - multiple transfusions, last done 11/4  - Eliquis held 2/2 pt having melena, and now pt with black stools/dark brown  - 11/14  restarted ASA 81 mg   - restarted Heparin SQ prophylaxis 11/14 today, will continue to hold off starting ac given melena.    #Allergic transfusion reaction: per RCU documentation, developed erythematous rash across chest shortly after starting transfusion, blood bank consulted and diagnosed mild allergic rxn  - premedicate w/ benadryl and famotidine prior to future transfusions  - no issue w/ pRBC transfusion 11/4    VASCULAR   # LLE POP DVT   - on Eliquis (held as of 11/11 PM for procedure and also patient having melena), stools are black/dark brown  - restarted Heparin SQ prophylaxis 11/14 and will continue to hold off starting Eliquis given melena.  - SCDs    # HD access  - TRISHA TAYLOR (9/27 - 10/21), replaced 10/24 -11/15  - TRISHA taylor discontinued 11/16 for line holiday, trialysis catheter placed in [facility] on 11/17 however was removed 11/18 iso poor flow rates during HD.  - Shiley placed 11/22, used for HD  -patient is not a candidate for a tunneled HD catheter given uptrending leukocytosis    ONC   # Hx of Breast CA   - Patient dx in 2018 and s/p B/L mastectomy (radical on right) and chemo and RT.     ENDOCRINE  # IDDM2   - Continue on NPH 6U with moderate ISS   - Adjust as needed    SKIN  #Rash  - Small vesicular rash to abdomen which is old, will CTM    ETHICS/ GOC    - Attempted palliative discussion however family not interested and wishes for FULL CODE  - Family continues to want everything done despite inability to tolerate HD on multiple oral pressor  - Levo capped at 0.3 during HD

## 2023-11-23 NOTE — PROGRESS NOTE ADULT - SUBJECTIVE AND OBJECTIVE BOX
Subjective: Patient seen and examined. No new events except as noted.   remains in ICU     REVIEW OF SYSTEMS:  Unable to obtain      MEDICATIONS:  MEDICATIONS  (STANDING):  albuterol/ipratropium for Nebulization 3 milliLiter(s) Nebulizer every 6 hours  artificial tears (preservative free) Ophthalmic Solution 1 Drop(s) Both EYES every 4 hours  aspirin  chewable 81 milliGRAM(s) Enteral Tube daily  atorvastatin 10 milliGRAM(s) Oral at bedtime  calamine/zinc oxide Lotion 1 Application(s) Topical daily  chlorhexidine 0.12% Liquid 15 milliLiter(s) Oral Mucosa every 12 hours  chlorhexidine 2% Cloths 1 Application(s) Topical daily  chlorhexidine 4% Liquid 1 Application(s) Topical <User Schedule>  dextrose 5%. 1000 milliLiter(s) (50 mL/Hr) IV Continuous <Continuous>  dextrose 5%. 1000 milliLiter(s) (100 mL/Hr) IV Continuous <Continuous>  dextrose 50% Injectable 25 Gram(s) IV Push once  dextrose 50% Injectable 25 Gram(s) IV Push once  dextrose 50% Injectable 25 Gram(s) IV Push once  dextrose 50% Injectable 12.5 Gram(s) IV Push once  droxidopa 600 milliGRAM(s) Oral every 8 hours  epoetin odalis (EPOGEN) Injectable 31350 Unit(s) IV Push <User Schedule>  ferrous    sulfate Liquid 300 milliGRAM(s) Oral daily  glucagon  Injectable 1 milliGRAM(s) IntraMuscular once  heparin   Injectable 5000 Unit(s) SubCutaneous every 8 hours  insulin lispro (ADMELOG) corrective regimen sliding scale   SubCutaneous every 6 hours  insulin NPH human recombinant 6 Unit(s) SubCutaneous every 6 hours  midodrine. 40 milliGRAM(s) Oral <User Schedule>  norepinephrine Infusion 0.05 MICROgram(s)/kG/Min (6.23 mL/Hr) IV Continuous <Continuous>  pantoprazole  Injectable 40 milliGRAM(s) IV Push two times a day  petrolatum Ophthalmic Ointment 1 Application(s) Both EYES four times a day  sodium chloride 1 Gram(s) Oral two times a day  sodium chloride 3%  Inhalation 4 milliLiter(s) Inhalation every 6 hours      PHYSICAL EXAM:  T(C): 37.2 (11-23-23 @ 04:00), Max: 37.2 (11-23-23 @ 04:00)  HR: 108 (11-23-23 @ 09:00) (100 - 126)  BP: --  RR: 0 (11-23-23 @ 09:00) (0 - 24)  SpO2: 99% (11-23-23 @ 09:00) (94% - 100%)  Wt(kg): --  I&O's Summary    22 Nov 2023 07:01  -  23 Nov 2023 07:00  --------------------------------------------------------  IN: 1511.6 mL / OUT: 2300 mL / NET: -788.4 mL            Appearance: NAD, +trach  +NGT  HEENT: dry oral mucosa  Lymphatic: No lymphadenopathy  Cardiovascular: Normal S1 S2, No JVD, No murmurs, No edema  Respiratory: Decreased BS, + trach to vent	  Neuro: opens eyes  Gastrointestinal: Soft, Non-tender, + BS, + NGT  Skin: No rashes, No ecchymoses, No cyanosis	  Extremities: No strength/ROM 2/2 sedation, + BL LE edema  Vascular: Peripheral pulses palpable 2+ bilaterally  Anasarca   Mode: AC/ CMV (Assist Control/ Continuous Mandatory Ventilation), RR (machine): 24, FiO2: 40, PEEP: 8, ITime: 0.8, MAP: 19, PC: 35, PIP: 38        LABS:    CARDIAC MARKERS:                                8.8    30.48 )-----------( 459      ( 23 Nov 2023 00:30 )             30.3     11-23    135  |  98  |  34<H>  ----------------------------<  166<H>  3.8   |  25  |  1.64<H>    Ca    8.5      23 Nov 2023 00:30  Phos  3.5     11-23  Mg     2.10     11-23    TPro  6.0  /  Alb  1.7<L>  /  TBili  <0.2  /  DBili  x   /  AST  42<H>  /  ALT  22  /  AlkPhos  417<H>  11-23            TELEMETRY: SR 	    ECG:  	  RADIOLOGY:   DIAGNOSTIC TESTING:  [ ] Echocardiogram:  [ ]  Catheterization:  [ ] Stress Test:    OTHER:

## 2023-11-23 NOTE — PROGRESS NOTE ADULT - ASSESSMENT
72 y/o F well known to me from my [facility] outhospitals practice. she was admitted at [facility] 7/12-7/22 w aspiration PNA, was treated w CEFEPIME, developed an allergic rash,  dCHF, + MAC on AFB culture, had been progressively getting more and more lethargic and dyspneic at home since DC.   In  am of 8/11/23  ptn presented with respiratory distress w hypoxia and hypercarbia requiring intubation 2/2 volume overload +/- Asp PNA      Neuro   not responsive  Baseline MS AOx3, aphasic   - h/o CVA , on aspirin and statin . resumed w feeding tube, ASA resumed 8/26  - eeg  2/2 tremors, no sz focus, right facial twitching, EEG has been negative. 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 probably embolic in nature, did not tolerate full AC in the past, STEPHANIE is neg , no shunts observed  - ptn is poorly reactive, rpt scan done, no further CVA seen.   - unresponsive    Cardiac   cardiology following  CHFpEF   TTE 7/2023 with EF 59%, with severe LVH and diastolic dysfunction   off Levo drip , now only during HD  on midodrine 40 mg qid, droxidopa 600 tid, additional 200 mg prior to HD,     Pulmonary   Acute hypercapnic and hypoxemic respiratory failure   prolonged intubation, now  trache to  vent, has copious secretions  large R pleural effusion on CXR from 11/22 post shiley placement      Renal   permacath will not be placed in IR 2/2 not medically stable.   HD restarted 11/22  after R IJ shiley placed    GI  NPO  on tube feeds  UGI bleed 8/31,  EGD/Colonoscopy: erosive gastropathy, esophagitis on colonoscopy old blood seen no active bleeding, poor prep  NGT-TF, Nepro  s/p 2UPRBC 11/4    Endocrine  on Insulin: NPH, Admelog    ID   complicated infectious hospital course  treated for MSSA bacteremia, aspiration PNA.  sputum + for pseudomonas, ptn was initially on zosyn but was allergic, then was switched to aztreonam   Aztreonam was switched to polymyxin for a possible allergy but ptn didnt have a reaction to aztreonam, she had a reaction to Zosyn.   spike temps again while off Abx, Aztreonam resumed 10/17, DCed 10/29  tmax 104.6 on 11/2-11/5, Cx NTD  leukocytosis is down today to 19 from 27K on 11/17. presumed this is 2/2 BAL via bronch on 11/16. afebrile  cont observing off ABx. ID follow up reviewed . 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 externa resolved    Heme/Onc  on eliquis for  LEFT POPLITEAL VEIN ACUTE DVT , on ELiquis    Ethics  GOC - Discussed GOC with daughter and , they have opted in the past for full code. and she remains full code at present

## 2023-11-23 NOTE — PROGRESS NOTE ADULT - ATTENDING COMMENTS
72 yo F PMH T2DM, HTN, CKD now dialysis dependent, HFpEF, breast ca s/p bilateral mastectomy, chemotherapy, and radiation (2018), hx of cardiac arrest (2018), L pca occipital cva here with prolonged hospitalization c/b chronic respiratory failure, renal failure, MDR infections, and AMS. She is now in persistent shock requiring oral vasopressors with minimal improvement in mental status. S/p aborted HD last night due to severe hypotension.    - discussion had with family today regarding overall poor prognosis  - s/p abx, no clinical signs of worsening infection. monitor off abx  - off IV vasopressors at this time. no urgent need for HD. will discuss with nephrology given significant hypotension  - ongoing GOC

## 2023-11-24 NOTE — PROGRESS NOTE ADULT - SUBJECTIVE AND OBJECTIVE BOX
Follow Up:      Inverval History/ROS:Patient is a 73y old  Female who presents with a chief complaint of Respiratory distress (24 Nov 2023 09:05)    Had HD on Wednesday- was hypotensive during HID    Allergies    isoniazid (Rash)  nafcillin (Unknown)  hydrALAZINE (Rash)  vitamin E (Short breath; Urticaria; Hives)  doxycycline (Rash)  cefepime (Rash)  NIFEdipine (Urticaria; Hives)  Zosyn (Rash)    Intolerances        ANTIMICROBIALS:      OTHER MEDS:  acetaminophen   Oral Liquid .. 650 milliGRAM(s) Oral every 6 hours PRN  albuterol/ipratropium for Nebulization 3 milliLiter(s) Nebulizer every 6 hours  artificial tears (preservative free) Ophthalmic Solution 1 Drop(s) Both EYES every 4 hours  aspirin  chewable 81 milliGRAM(s) Enteral Tube daily  atorvastatin 10 milliGRAM(s) Oral at bedtime  calamine/zinc oxide Lotion 1 Application(s) Topical daily  chlorhexidine 0.12% Liquid 15 milliLiter(s) Oral Mucosa every 12 hours  chlorhexidine 2% Cloths 1 Application(s) Topical daily  chlorhexidine 4% Liquid 1 Application(s) Topical <User Schedule>  dextrose 5%. 1000 milliLiter(s) IV Continuous <Continuous>  dextrose 5%. 1000 milliLiter(s) IV Continuous <Continuous>  dextrose 50% Injectable 12.5 Gram(s) IV Push once  dextrose 50% Injectable 25 Gram(s) IV Push once  dextrose 50% Injectable 25 Gram(s) IV Push once  dextrose 50% Injectable 25 Gram(s) IV Push once  dextrose Oral Gel 15 Gram(s) Oral once PRN  diphenhydrAMINE Elixir 50 milliGRAM(s) Oral once PRN  droxidopa 600 milliGRAM(s) Oral every 8 hours  droxidopa 600 milliGRAM(s) Oral <User Schedule> PRN  epoetin odalis (EPOGEN) Injectable 62226 Unit(s) IV Push <User Schedule>  ferrous    sulfate Liquid 300 milliGRAM(s) Oral daily  glucagon  Injectable 1 milliGRAM(s) IntraMuscular once  heparin   Injectable 5000 Unit(s) SubCutaneous every 8 hours  insulin lispro (ADMELOG) corrective regimen sliding scale   SubCutaneous every 6 hours  insulin NPH human recombinant 4 Unit(s) SubCutaneous every 6 hours  midodrine. 40 milliGRAM(s) Oral once PRN  midodrine. 40 milliGRAM(s) Oral <User Schedule>  norepinephrine Infusion 0.05 MICROgram(s)/kG/Min IV Continuous <Continuous>  pantoprazole  Injectable 40 milliGRAM(s) IV Push two times a day  petrolatum Ophthalmic Ointment 1 Application(s) Both EYES four times a day  sodium chloride 1 Gram(s) Oral two times a day  sodium chloride 0.9% Bolus. 250 milliLiter(s) IV Bolus every 5 minutes PRN  sodium chloride 0.9% lock flush 10 milliLiter(s) IV Push every 1 hour PRN  sodium chloride 3%  Inhalation 4 milliLiter(s) Inhalation every 6 hours      Vital Signs Last 24 Hrs  T(C): 37.1 (24 Nov 2023 08:00), Max: 37.1 (24 Nov 2023 08:00)  T(F): 98.7 (24 Nov 2023 08:00), Max: 98.7 (24 Nov 2023 08:00)  HR: 106 (24 Nov 2023 09:30) (98 - 125)  BP: --  BP(mean): --  RR: 14 (24 Nov 2023 08:00) (6 - 25)  SpO2: 100% (24 Nov 2023 09:30) (96% - 100%)    Parameters below as of 24 Nov 2023 09:30  Patient On (Oxygen Delivery Method): ventilator        PHYSICAL EXAM:  General: x[ ] trach  HEAD/EYES: [ ] PERRL [x ] white sclera [ ] icterus  ENT:  [ ] normal x[ ] supple [ ] thrush [ ] pharyngeal exudate  Cardiovascular:   [ ] murmur x[ ] normal [ ] PPM/AICD  Respiratory:  x[ ] clear to ausculation bilaterally  GI:  [ x] soft, non-tender, normal bowel sounds  :  [ ] willard x[ ] no CVA tenderness   Musculoskeletal:  [ ] no synovitis  Neurologic:  [ ] non-focal exam   Skin:  [ x] no rash  Lymph: [ x] no lymphadenopathy  Psychiatric:  [ ] appropriate affect [ ] alert & oriented  Lines:  [x ] no phlebitis [ ] central line                                8.1    14.26 )-----------( 410      ( 24 Nov 2023 00:30 )             27.5       11-24    133<L>  |  97<L>  |  43<H>  ----------------------------<  197<H>  3.9   |  22  |  1.95<H>    Ca    8.8      24 Nov 2023 00:30  Phos  3.8     11-24  Mg     2.30     11-24    TPro  5.9<L>  /  Alb  1.6<L>  /  TBili  <0.2  /  DBili  x   /  AST  26  /  ALT  18  /  AlkPhos  424<H>  11-24      Urinalysis Basic - ( 24 Nov 2023 00:30 )    Color: x / Appearance: x / SG: x / pH: x  Gluc: 197 mg/dL / Ketone: x  / Bili: x / Urobili: x   Blood: x / Protein: x / Nitrite: x   Leuk Esterase: x / RBC: x / WBC x   Sq Epi: x / Non Sq Epi: x / Bacteria: x        MICROBIOLOGY:Culture Results:   No growth at 24 hours (11-22-23 @ 09:30)  Culture Results:   Growth in anaerobic bottle: Gram positive cocci in pairs  Direct identification is available within approximately 3-5  hours either by Blood Panel Multiplexed PCR or Direct  MALDI-TOF. Details: [email or web address] (11-22-23 @ 09:00)      RADIOLOGY:

## 2023-11-24 NOTE — PROGRESS NOTE ADULT - SUBJECTIVE AND OBJECTIVE BOX
NEPHROLOGY     Patient seen and examined trach to vent, off levo, last dialyzed Wednesday and removed 1.5L.     MEDICATIONS  (STANDING):  albuterol/ipratropium for Nebulization 3 milliLiter(s) Nebulizer every 6 hours  artificial tears (preservative free) Ophthalmic Solution 1 Drop(s) Both EYES every 4 hours  aspirin  chewable 81 milliGRAM(s) Enteral Tube daily  atorvastatin 10 milliGRAM(s) Oral at bedtime  calamine/zinc oxide Lotion 1 Application(s) Topical daily  chlorhexidine 0.12% Liquid 15 milliLiter(s) Oral Mucosa every 12 hours  chlorhexidine 2% Cloths 1 Application(s) Topical daily  chlorhexidine 4% Liquid 1 Application(s) Topical <User Schedule>  dextrose 5%. 1000 milliLiter(s) (50 mL/Hr) IV Continuous <Continuous>  dextrose 5%. 1000 milliLiter(s) (100 mL/Hr) IV Continuous <Continuous>  dextrose 50% Injectable 12.5 Gram(s) IV Push once  dextrose 50% Injectable 25 Gram(s) IV Push once  dextrose 50% Injectable 25 Gram(s) IV Push once  dextrose 50% Injectable 25 Gram(s) IV Push once  droxidopa 600 milliGRAM(s) Oral every 8 hours  epoetin odalis (EPOGEN) Injectable 78428 Unit(s) IV Push <User Schedule>  ferrous    sulfate Liquid 300 milliGRAM(s) Oral daily  glucagon  Injectable 1 milliGRAM(s) IntraMuscular once  heparin   Injectable 5000 Unit(s) SubCutaneous every 8 hours  insulin lispro (ADMELOG) corrective regimen sliding scale   SubCutaneous every 6 hours  insulin NPH human recombinant 4 Unit(s) SubCutaneous every 6 hours  midodrine. 40 milliGRAM(s) Oral <User Schedule>  norepinephrine Infusion 0.05 MICROgram(s)/kG/Min (6.23 mL/Hr) IV Continuous <Continuous>  pantoprazole  Injectable 40 milliGRAM(s) IV Push two times a day  petrolatum Ophthalmic Ointment 1 Application(s) Both EYES four times a day  sodium chloride 1 Gram(s) Oral two times a day  sodium chloride 3%  Inhalation 4 milliLiter(s) Inhalation every 6 hours    VITALS:  T(C): , Max: 37.1 (11-24-23 @ 08:00)  T(F): , Max: 98.7 (11-24-23 @ 08:00)  HR: 109 (11-24-23 @ 08:00)  RR: 14 (11-24-23 @ 08:00)  SpO2: 100% (11-24-23 @ 08:00)    I and O's:    11-23 @ 07:01  -  11-24 @ 07:00  --------------------------------------------------------  IN: 1060 mL / OUT: 400 mL / NET: 660 mL    PHYSICAL EXAM:  Constitutional: critically ill, trach to vent   HEENT: + NGT, + tracheostomy   Respiratory: coarse BS  Cardiovascular: tachy s1s2  Gastrointestinal: BS+, soft, NT/ND  Extremities: ++ generalized edema  : No Wheeler  Skin: No rashes  Access: [facility]     LABS:                        8.1    14.26 )-----------( 410      ( 24 Nov 2023 00:30 )             27.5     11-24    133<L>  |  97<L>  |  43<H>  ----------------------------<  197<H>  3.9   |  22  |  1.95<H>    Ca    8.8      24 Nov 2023 00:30  Phos  3.8     11-24  Mg     2.30     11-24    TPro  5.9<L>  /  Alb  1.6<L>  /  TBili  <0.2  /  DBili  x   /  AST  26  /  ALT  18  /  AlkPhos  424<H>  11-24     NEPHROLOGY     Patient seen and examined trach to vent, off levo, last dialyzed Wednesday and removed 1.5L, noted shiley with poor flow.     MEDICATIONS  (STANDING):  albuterol/ipratropium for Nebulization 3 milliLiter(s) Nebulizer every 6 hours  artificial tears (preservative free) Ophthalmic Solution 1 Drop(s) Both EYES every 4 hours  aspirin  chewable 81 milliGRAM(s) Enteral Tube daily  atorvastatin 10 milliGRAM(s) Oral at bedtime  calamine/zinc oxide Lotion 1 Application(s) Topical daily  chlorhexidine 0.12% Liquid 15 milliLiter(s) Oral Mucosa every 12 hours  chlorhexidine 2% Cloths 1 Application(s) Topical daily  chlorhexidine 4% Liquid 1 Application(s) Topical <User Schedule>  dextrose 5%. 1000 milliLiter(s) (50 mL/Hr) IV Continuous <Continuous>  dextrose 5%. 1000 milliLiter(s) (100 mL/Hr) IV Continuous <Continuous>  dextrose 50% Injectable 12.5 Gram(s) IV Push once  dextrose 50% Injectable 25 Gram(s) IV Push once  dextrose 50% Injectable 25 Gram(s) IV Push once  dextrose 50% Injectable 25 Gram(s) IV Push once  droxidopa 600 milliGRAM(s) Oral every 8 hours  epoetin odalis (EPOGEN) Injectable 96035 Unit(s) IV Push <User Schedule>  ferrous    sulfate Liquid 300 milliGRAM(s) Oral daily  glucagon  Injectable 1 milliGRAM(s) IntraMuscular once  heparin   Injectable 5000 Unit(s) SubCutaneous every 8 hours  insulin lispro (ADMELOG) corrective regimen sliding scale   SubCutaneous every 6 hours  insulin NPH human recombinant 4 Unit(s) SubCutaneous every 6 hours  midodrine. 40 milliGRAM(s) Oral <User Schedule>  norepinephrine Infusion 0.05 MICROgram(s)/kG/Min (6.23 mL/Hr) IV Continuous <Continuous>  pantoprazole  Injectable 40 milliGRAM(s) IV Push two times a day  petrolatum Ophthalmic Ointment 1 Application(s) Both EYES four times a day  sodium chloride 1 Gram(s) Oral two times a day  sodium chloride 3%  Inhalation 4 milliLiter(s) Inhalation every 6 hours    VITALS:  T(C): , Max: 37.1 (11-24-23 @ 08:00)  T(F): , Max: 98.7 (11-24-23 @ 08:00)  HR: 109 (11-24-23 @ 08:00)  RR: 14 (11-24-23 @ 08:00)  SpO2: 100% (11-24-23 @ 08:00)    I and O's:    11-23 @ 07:01  -  11-24 @ 07:00  --------------------------------------------------------  IN: 1060 mL / OUT: 400 mL / NET: 660 mL    PHYSICAL EXAM:  Constitutional: critically ill, trach to vent   HEENT: + NGT, + tracheostomy   Respiratory: coarse BS  Cardiovascular: tachy s1s2  Gastrointestinal: BS+, soft, NT/ND  Extremities: ++ generalized edema  : No Wheeler  Skin: No rashes  Access: [facility]     LABS:                        8.1    14.26 )-----------( 410      ( 24 Nov 2023 00:30 )             27.5     11-24    133<L>  |  97<L>  |  43<H>  ----------------------------<  197<H>  3.9   |  22  |  1.95<H>    Ca    8.8      24 Nov 2023 00:30  Phos  3.8     11-24  Mg     2.30     11-24    TPro  5.9<L>  /  Alb  1.6<L>  /  TBili  <0.2  /  DBili  x   /  AST  26  /  ALT  18  /  AlkPhos  424<H>  11-24

## 2023-11-24 NOTE — PROGRESS NOTE ADULT - ASSESSMENT
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 Last dialyzed Wednesday     (2)CV- tenuous hemodynamics - intermittently requiring pressor gtt/on q6h Midodrine     (3)Pulm- trach/vent-dependent    (4)Anemia- on Epogen with HD    (5)GOC - s/p repeated discussions with family regarding GOC - family persisting with interest in aggressive measures.        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 Last dialyzed Wednesday     (2)CV- tenuous hemodynamics - intermittently requiring pressor gtt/on q6h Midodrine     (3)Pulm- trach/vent-dependent    (4)Anemia- on Epogen with HD    (5)GOC - s/p repeated discussions with family regarding GOC - family persisting with interest in aggressive measures.     RECOMMEND:  (1)Next HD tomorrow: 3hrs, 2.4 kg UF  (2)Epo with HD    Nilda Espinoza DNP  [facility]  [phone]        IMPRESSION: 73F w/ HTN, DM2, CVA, breast CA-bilateral mastectomy, recurrent aspiration pneumonia/respiratory failure, and CKD, 8/11/23 p/w acute hypercapnic respiratory failure; c/b RUSS    (1)Renal - RUSS on CKD4 ==>newly ESRD-HD as of this admission. Last dialyzed Wednesday     (2)CV- tenuous hemodynamics - intermittently requiring pressor gtt/on q6h Midodrine     (3)Pulm- trach/vent-dependent    (4)Anemia- on Epogen with HD    (5)GOC - s/p repeated discussions with family regarding GOC - family persisting with interest in aggressive measures.     RECOMMEND:  (1)Next HD tomorrow: 3hrs, 2.4 kg UF  (2)Epo with HD    Nilda Espinoza DNP  [facility]  [phone]     RENAL ATTENDING NOTE  Patient seen and examined with NP. Agree with assessment and plan as above.    Jimmy Colon MD  [facility]  [phone]

## 2023-11-24 NOTE — PROGRESS NOTE ADULT - SUBJECTIVE AND OBJECTIVE BOX
Patient is a 73y old  Female who presents with a chief complaint of Respiratory distress (24 Nov 2023 10:19)      SUBJECTIVE / OVERNIGHT EVENTS: wbc improving, HD as per renal MWF, 1/4 bottles growing MRSE, cx to be repeated as per ID prob contaminant     MEDICATIONS  (STANDING):  albuterol/ipratropium for Nebulization 3 milliLiter(s) Nebulizer every 6 hours  artificial tears (preservative free) Ophthalmic Solution 1 Drop(s) Both EYES every 4 hours  aspirin  chewable 81 milliGRAM(s) Enteral Tube daily  atorvastatin 10 milliGRAM(s) Oral at bedtime  calamine/zinc oxide Lotion 1 Application(s) Topical daily  chlorhexidine 0.12% Liquid 15 milliLiter(s) Oral Mucosa every 12 hours  chlorhexidine 2% Cloths 1 Application(s) Topical daily  chlorhexidine 4% Liquid 1 Application(s) Topical <User Schedule>  dextrose 5%. 1000 milliLiter(s) (50 mL/Hr) IV Continuous <Continuous>  dextrose 5%. 1000 milliLiter(s) (100 mL/Hr) IV Continuous <Continuous>  dextrose 50% Injectable 12.5 Gram(s) IV Push once  dextrose 50% Injectable 25 Gram(s) IV Push once  dextrose 50% Injectable 25 Gram(s) IV Push once  dextrose 50% Injectable 25 Gram(s) IV Push once  droxidopa 600 milliGRAM(s) Oral every 8 hours  epoetin odalis (EPOGEN) Injectable 55233 Unit(s) IV Push <User Schedule>  ferrous    sulfate Liquid 300 milliGRAM(s) Oral daily  glucagon  Injectable 1 milliGRAM(s) IntraMuscular once  heparin   Injectable 5000 Unit(s) SubCutaneous every 8 hours  insulin lispro (ADMELOG) corrective regimen sliding scale   SubCutaneous every 6 hours  insulin NPH human recombinant 4 Unit(s) SubCutaneous every 6 hours  midodrine. 40 milliGRAM(s) Oral <User Schedule>  norepinephrine Infusion 0.05 MICROgram(s)/kG/Min (6.23 mL/Hr) IV Continuous <Continuous>  pantoprazole  Injectable 40 milliGRAM(s) IV Push two times a day  petrolatum Ophthalmic Ointment 1 Application(s) Both EYES four times a day  sodium chloride 1 Gram(s) Oral two times a day  sodium chloride 3%  Inhalation 4 milliLiter(s) Inhalation every 6 hours    MEDICATIONS  (PRN):  acetaminophen   Oral Liquid .. 650 milliGRAM(s) Oral every 6 hours PRN Temp greater or equal to 38C (100.4F), Mild Pain (1 - 3)  dextrose Oral Gel 15 Gram(s) Oral once PRN Blood Glucose LESS THAN 70 milliGRAM(s)/deciliter  diphenhydrAMINE Elixir 50 milliGRAM(s) Oral once PRN Rash and/or Itching  droxidopa 600 milliGRAM(s) Oral <User Schedule> PRN prior to hemodialysis  midodrine. 40 milliGRAM(s) Oral once PRN 1 Hour Pre HD  sodium chloride 0.9% Bolus. 250 milliLiter(s) IV Bolus every 5 minutes PRN SBP LESS THAN or EQUAL to 90 mmHg  sodium chloride 0.9% lock flush 10 milliLiter(s) IV Push every 1 hour PRN Pre/post blood products, medications, blood draw, and to maintain line patency      Vital Signs Last 24 Hrs  T(F): 98.7 (11-24-23 @ 08:00), Max: 98.7 (11-24-23 @ 08:00)  HR: 109 (11-24-23 @ 12:00) (98 - 125)  BP: --  RR: 24 (11-24-23 @ 12:00) (6 - 24)  SpO2: 100% (11-24-23 @ 12:00) (96% - 100%)  Telemetry:   CAPILLARY BLOOD GLUCOSE      POCT Blood Glucose.: 218 mg/dL (24 Nov 2023 14:00)  POCT Blood Glucose.: 194 mg/dL (24 Nov 2023 05:25)  POCT Blood Glucose.: 185 mg/dL (24 Nov 2023 00:19)  POCT Blood Glucose.: 96 mg/dL (23 Nov 2023 17:06)    I&O's Summary    23 Nov 2023 07:01  -  24 Nov 2023 07:00  --------------------------------------------------------  IN: 1060 mL / OUT: 400 mL / NET: 660 mL    24 Nov 2023 07:01  -  24 Nov 2023 14:30  --------------------------------------------------------  IN: 210 mL / OUT: 0 mL / NET: 210 mL        PHYSICAL EXAM:  GENERAL: NAD, well-developed  HEAD:  Atraumatic, Normocephalic  EYES: EOMI, PERRLA, conjunctiva and sclera clear  NECK: Supple, No JVD  CHEST/LUNG: Clear to auscultation bilaterally; No wheeze  HEART: Regular rate and rhythm; No murmurs, rubs, or gallops  ABDOMEN: Soft, Nontender, Nondistended; Bowel sounds present  EXTREMITIES:  2+ Peripheral Pulses, No clubbing, cyanosis, or edema  PSYCH: AAOx3  NEUROLOGY: non-focal  SKIN: No rashes or lesions    LABS:                        8.1    14.26 )-----------( 410      ( 24 Nov 2023 00:30 )             27.5     11-24    133<L>  |  97<L>  |  43<H>  ----------------------------<  197<H>  3.9   |  22  |  1.95<H>    Ca    8.8      24 Nov 2023 00:30  Phos  3.8     11-24  Mg     2.30     11-24    TPro  5.9<L>  /  Alb  1.6<L>  /  TBili  <0.2  /  DBili  x   /  AST  26  /  ALT  18  /  AlkPhos  424<H>  11-24          Urinalysis Basic - ( 24 Nov 2023 00:30 )    Color: x / Appearance: x / SG: x / pH: x  Gluc: 197 mg/dL / Ketone: x  / Bili: x / Urobili: x   Blood: x / Protein: x / Nitrite: x   Leuk Esterase: x / RBC: x / WBC x   Sq Epi: x / Non Sq Epi: x / Bacteria: x        RADIOLOGY & ADDITIONAL TESTS:    Imaging Personally Reviewed:    Consultant(s) Notes Reviewed:      Care Discussed with Consultants/Other Providers:

## 2023-11-24 NOTE — PROGRESS NOTE ADULT - SUBJECTIVE AND OBJECTIVE BOX
Subjective: Patient seen and examined. No new events except as noted.   remains in ICU       REVIEW OF SYSTEMS:    Unable to obtain     MEDICATIONS:  MEDICATIONS  (STANDING):  albuterol/ipratropium for Nebulization 3 milliLiter(s) Nebulizer every 6 hours  artificial tears (preservative free) Ophthalmic Solution 1 Drop(s) Both EYES every 4 hours  aspirin  chewable 81 milliGRAM(s) Enteral Tube daily  atorvastatin 10 milliGRAM(s) Oral at bedtime  calamine/zinc oxide Lotion 1 Application(s) Topical daily  chlorhexidine 0.12% Liquid 15 milliLiter(s) Oral Mucosa every 12 hours  chlorhexidine 2% Cloths 1 Application(s) Topical daily  chlorhexidine 4% Liquid 1 Application(s) Topical <User Schedule>  dextrose 5%. 1000 milliLiter(s) (50 mL/Hr) IV Continuous <Continuous>  dextrose 5%. 1000 milliLiter(s) (100 mL/Hr) IV Continuous <Continuous>  dextrose 50% Injectable 12.5 Gram(s) IV Push once  dextrose 50% Injectable 25 Gram(s) IV Push once  dextrose 50% Injectable 25 Gram(s) IV Push once  dextrose 50% Injectable 25 Gram(s) IV Push once  droxidopa 600 milliGRAM(s) Oral every 8 hours  epoetin odalis (EPOGEN) Injectable 48734 Unit(s) IV Push <User Schedule>  ferrous    sulfate Liquid 300 milliGRAM(s) Oral daily  glucagon  Injectable 1 milliGRAM(s) IntraMuscular once  heparin   Injectable 5000 Unit(s) SubCutaneous every 8 hours  insulin lispro (ADMELOG) corrective regimen sliding scale   SubCutaneous every 6 hours  insulin NPH human recombinant 4 Unit(s) SubCutaneous every 6 hours  midodrine. 40 milliGRAM(s) Oral <User Schedule>  norepinephrine Infusion 0.05 MICROgram(s)/kG/Min (6.23 mL/Hr) IV Continuous <Continuous>  pantoprazole  Injectable 40 milliGRAM(s) IV Push two times a day  petrolatum Ophthalmic Ointment 1 Application(s) Both EYES four times a day  sodium chloride 1 Gram(s) Oral two times a day  sodium chloride 3%  Inhalation 4 milliLiter(s) Inhalation every 6 hours      PHYSICAL EXAM:  T(C): 37.1 (11-24-23 @ 08:00), Max: 37.1 (11-24-23 @ 08:00)  HR: 106 (11-24-23 @ 09:30) (98 - 125)  BP: --  RR: 14 (11-24-23 @ 08:00) (6 - 25)  SpO2: 100% (11-24-23 @ 09:30) (96% - 100%)  Wt(kg): --  I&O's Summary    23 Nov 2023 07:01  -  24 Nov 2023 07:00  --------------------------------------------------------  IN: 1060 mL / OUT: 400 mL / NET: 660 mL            Appearance: NAD, +trach  +NGT  HEENT: dry oral mucosa  Lymphatic: No lymphadenopathy  Cardiovascular: Normal S1 S2, No JVD, No murmurs, No edema  Respiratory: Decreased BS, + trach to vent	  Neuro: opens eyes  Gastrointestinal: Soft, Non-tender, + BS, + NGT  Skin: No rashes, No ecchymoses, No cyanosis	  Extremities: No strength/ROM 2/2 sedation, + BL LE edema  Vascular: Peripheral pulses palpable 2+ bilaterally  Anasarca   Mode: AC/ CMV (Assist Control/ Continuous Mandatory Ventilation), RR (machine): 24, FiO2: 40, PEEP: 8, ITime: 0.8, MAP: 19, PC: 35, PIP: 38      LABS:    CARDIAC MARKERS:                                8.1    14.26 )-----------( 410      ( 24 Nov 2023 00:30 )             27.5     11-24    133<L>  |  97<L>  |  43<H>  ----------------------------<  197<H>  3.9   |  22  |  1.95<H>    Ca    8.8      24 Nov 2023 00:30  Phos  3.8     11-24  Mg     2.30     11-24    TPro  5.9<L>  /  Alb  1.6<L>  /  TBili  <0.2  /  DBili  x   /  AST  26  /  ALT  18  /  AlkPhos  424<H>  11-24    proBNP:   Lipid Profile:   HgA1c:   TSH:             TELEMETRY: 	  SR ST   ECG:  	  RADIOLOGY:   DIAGNOSTIC TESTING:  [ ] Echocardiogram:  [ ]  Catheterization:  [ ] Stress Test:    OTHER:

## 2023-11-24 NOTE — PROGRESS NOTE ADULT - ASSESSMENT
74 y/o F well known to me from my [facility]. she was admitted at [facility] 7/12-7/22 w aspiration PNA, was treated w CEFEPIME, developed an allergic rash,  dCHF, + MAC on AFB culture, had been progressively getting more and more lethargic and dyspneic at home since DC.   In  am of 8/11/23  ptn presented with respiratory distress w hypoxia and hypercarbia requiring intubation 2/2 volume overload +/- Asp PNA      Neuro   not responsive  Baseline MS AOx3, aphasic   - h/o CVA , on aspirin and statin . resumed w feeding tube, ASA resumed 8/26  - eeg  2/2 tremors, no sz focus, right facial twitching, EEG has been negative. 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 probably embolic in nature, did not tolerate full AC in the past, STEPHANIE is neg , no shunts observed  - ptn is poorly reactive, rpt scan done, no further CVA seen.   - unresponsive    Cardiac   cardiology following  CHFpEF   TTE 7/2023 with EF 59%, with severe LVH and diastolic dysfunction   off Levo drip , now only during HD  on midodrine 40 mg qid, droxidopa 600 tid, additional 200 mg prior to HD,     Pulmonary   Acute hypercapnic and hypoxemic respiratory failure   prolonged intubation, now  trache to  vent, has copious secretions  large R pleural effusion on CXR from 11/22 post shiley placement      Renal   permacath will not be placed in IR 2/2 not medically stable.   HD restarted 11/22  after R IJ shiley placed    GI  NPO  on tube feeds  UGI bleed 8/31,  EGD/Colonoscopy: erosive gastropathy, esophagitis on colonoscopy old blood seen no active bleeding, poor prep  NGT-TF, Nepro  s/p 2UPRBC 11/4    Endocrine  on Insulin: NPH, Admelog    ID   complicated infectious hospital course  treated for MSSA bacteremia, aspiration PNA.  sputum + for pseudomonas, ptn was initially on zosyn but was allergic, then was switched to aztreonam   Aztreonam was switched to polymyxin for a possible allergy but ptn didnt have a reaction to aztreonam, she had a reaction to Zosyn.   spike temps again while off Abx, Aztreonam resumed 10/17, DCed 10/29  tmax 104.6 on 11/2-11/5, Cx NTD  leukocytosis is down today to 19 from 27K on 11/17. presumed this is 2/2 BAL via bronch on 11/16. 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 externa resolved    Heme/Onc  on eliquis for  LEFT POPLITEAL VEIN ACUTE DVT , on ELiquis    Ethics  GOC - Discussed GOC with daughter and , they have opted in the past for full code. and she remains full code at present

## 2023-11-24 NOTE — PROGRESS NOTE ADULT - SUBJECTIVE AND OBJECTIVE BOX
INTERVAL HPI/OVERNIGHT EVENTS:    SUBJECTIVE: Patient seen and examined at bedside. No acute overnight events. Mentation unchanged.     ROS: All negative except as listed above.    VITAL SIGNS:  ICU Vital Signs Last 24 Hrs  T(C): 37.1 (24 Nov 2023 08:00), Max: 37.1 (24 Nov 2023 08:00)  T(F): 98.7 (24 Nov 2023 08:00), Max: 98.7 (24 Nov 2023 08:00)  HR: 112 (24 Nov 2023 11:00) (98 - 125)  BP: --  BP(mean): --  ABP: 114/38 (24 Nov 2023 11:00) (105/32 - 128/44)  ABP(mean): 68 (24 Nov 2023 11:00) (61 - 79)  RR: 24 (24 Nov 2023 11:00) (6 - 25)  SpO2: 100% (24 Nov 2023 11:00) (96% - 100%)    O2 Parameters below as of 24 Nov 2023 11:00  Patient On (Oxygen Delivery Method): ventilator    O2 Concentration (%): 40      Mode: AC/ CMV (Assist Control/ Continuous Mandatory Ventilation), RR (machine): 24, FiO2: 40, PEEP: 8, ITime: 0.8, MAP: 19, PC: 35, PIP: 43  Plateau pressure:   P/F ratio:     11-23 @ 07:01  -  11-24 @ 07:00  --------------------------------------------------------  IN: 1060 mL / OUT: 400 mL / NET: 660 mL    11-24 @ 07:01  -  11-24 @ 11:14  --------------------------------------------------------  IN: 210 mL / OUT: 0 mL / NET: 210 mL      CAPILLARY BLOOD GLUCOSE      POCT Blood Glucose.: 194 mg/dL (24 Nov 2023 05:25)      ECG: reviewed.    PHYSICAL EXAM:    CONSTITUTIONAL: Trached, critically ill    RESPIRATORY: +vent and coarse breath sounds auscultated  CARDIOVASCULAR: Tachycardic and regular rhythm, normal S1 and S2, no murmur/rub/gallop;  Peripheral pulses are 2+ bilaterally  ABDOMEN: +NGT. distended but soft. Nontender to palpation, normoactive bowel sounds, no rebound/guarding; No hepatosplenomegaly. Rectal tube w/ black output  MSK: no clubbing or cyanosis of digits; no joint swelling or tenderness to palpation  Extremities: Anasarca. 2+ BLE edema to knees, 1+ edema of bilateral thighs. 3+ edema of bilateral forearms  Neuro: Does not respond to verbal or physical stimuli. Does not follow commands    MEDICATIONS:  MEDICATIONS  (STANDING):  albuterol/ipratropium for Nebulization 3 milliLiter(s) Nebulizer every 6 hours  artificial tears (preservative free) Ophthalmic Solution 1 Drop(s) Both EYES every 4 hours  aspirin  chewable 81 milliGRAM(s) Enteral Tube daily  atorvastatin 10 milliGRAM(s) Oral at bedtime  calamine/zinc oxide Lotion 1 Application(s) Topical daily  chlorhexidine 0.12% Liquid 15 milliLiter(s) Oral Mucosa every 12 hours  chlorhexidine 2% Cloths 1 Application(s) Topical daily  chlorhexidine 4% Liquid 1 Application(s) Topical <User Schedule>  dextrose 5%. 1000 milliLiter(s) (100 mL/Hr) IV Continuous <Continuous>  dextrose 5%. 1000 milliLiter(s) (50 mL/Hr) IV Continuous <Continuous>  dextrose 50% Injectable 25 Gram(s) IV Push once  dextrose 50% Injectable 25 Gram(s) IV Push once  dextrose 50% Injectable 25 Gram(s) IV Push once  dextrose 50% Injectable 12.5 Gram(s) IV Push once  droxidopa 600 milliGRAM(s) Oral every 8 hours  epoetin odalis (EPOGEN) Injectable 48788 Unit(s) IV Push <User Schedule>  ferrous    sulfate Liquid 300 milliGRAM(s) Oral daily  glucagon  Injectable 1 milliGRAM(s) IntraMuscular once  heparin   Injectable 5000 Unit(s) SubCutaneous every 8 hours  insulin lispro (ADMELOG) corrective regimen sliding scale   SubCutaneous every 6 hours  insulin NPH human recombinant 4 Unit(s) SubCutaneous every 6 hours  midodrine. 40 milliGRAM(s) Oral <User Schedule>  norepinephrine Infusion 0.05 MICROgram(s)/kG/Min (6.23 mL/Hr) IV Continuous <Continuous>  pantoprazole  Injectable 40 milliGRAM(s) IV Push two times a day  petrolatum Ophthalmic Ointment 1 Application(s) Both EYES four times a day  sodium chloride 1 Gram(s) Oral two times a day  sodium chloride 3%  Inhalation 4 milliLiter(s) Inhalation every 6 hours    MEDICATIONS  (PRN):  acetaminophen   Oral Liquid .. 650 milliGRAM(s) Oral every 6 hours PRN Temp greater or equal to 38C (100.4F), Mild Pain (1 - 3)  dextrose Oral Gel 15 Gram(s) Oral once PRN Blood Glucose LESS THAN 70 milliGRAM(s)/deciliter  diphenhydrAMINE Elixir 50 milliGRAM(s) Oral once PRN Rash and/or Itching  droxidopa 600 milliGRAM(s) Oral <User Schedule> PRN prior to hemodialysis  midodrine. 40 milliGRAM(s) Oral once PRN 1 Hour Pre HD  sodium chloride 0.9% Bolus. 250 milliLiter(s) IV Bolus every 5 minutes PRN SBP LESS THAN or EQUAL to 90 mmHg  sodium chloride 0.9% lock flush 10 milliLiter(s) IV Push every 1 hour PRN Pre/post blood products, medications, blood draw, and to maintain line patency      ALLERGIES:  Allergies    isoniazid (Rash)  nafcillin (Unknown)  hydrALAZINE (Rash)  vitamin E (Short breath; Urticaria; Hives)  doxycycline (Rash)  cefepime (Rash)  NIFEdipine (Urticaria; Hives)  Zosyn (Rash)    Intolerances        LABS:                        8.1    14.26 )-----------( 410      ( 24 Nov 2023 00:30 )             27.5     11-24    133<L>  |  97<L>  |  43<H>  ----------------------------<  197<H>  3.9   |  22  |  1.95<H>    Ca    8.8      24 Nov 2023 00:30  Phos  3.8     11-24  Mg     2.30     11-24    TPro  5.9<L>  /  Alb  1.6<L>  /  TBili  <0.2  /  DBili  x   /  AST  26  /  ALT  18  /  AlkPhos  424<H>  11-24      Urinalysis Basic - ( 24 Nov 2023 00:30 )    Color: x / Appearance: x / SG: x / pH: x  Gluc: 197 mg/dL / Ketone: x  / Bili: x / Urobili: x   Blood: x / Protein: x / Nitrite: x   Leuk Esterase: x / RBC: x / WBC x   Sq Epi: x / Non Sq Epi: x / Bacteria: x      ABG:      vBG:    Micro:    Culture - Blood (collected 11-22-23 @ 09:30)  Source: .Blood Blood-Peripheral  Preliminary Report (11-23-23 @ 17:02):    No growth at 24 hours    Culture - Blood (collected 11-22-23 @ 09:00)  Source: .Blood Blood-Venous  Gram Stain (11-23-23 @ 22:30):    Growth in anaerobic bottle: Gram positive cocci in pairs  Preliminary Report (11-23-23 @ 22:30):    Growth in anaerobic bottle: Gram positive cocci in pairs    Direct identification is available within approximately 3-5    hours either by Blood Panel Multiplexed PCR or Direct    MALDI-TOF. Details: [email or web address]  Organism: Blood Culture PCR (11-24-23 @ 00:08)  Organism: Blood Culture PCR (11-24-23 @ 00:08)      Method Type: PCR      -  Staphylococcus epidermidis, Methicillin resistant: Detec    Culture - Blood (collected 11-02-23 @ 09:10)  Source: .Blood Blood-Peripheral  Final Report (11-07-23 @ 12:01):    No growth at 5 days    Culture - Blood (collected 11-02-23 @ 09:00)  Source: .Blood Blood-Peripheral  Final Report (11-07-23 @ 12:01):    No growth at 5 days        Culture - Sputum (collected 11-02-23 @ 10:24)  Source: ET Tube ET Tube  Gram Stain (11-02-23 @ 15:31):    Moderate polymorphonuclear leukocytes per low power field    No Squamous epithelial cells per low power field    Moderate Gram Negative Rods per oil power field  Final Report (11-04-23 @ 22:56):    Numerous Pseudomonas aeruginosa (Carbapenem Resistant)  Organism: Pseudomonas aeruginosa (Carbapenem Resistant) (11-04-23 @ 22:56)  Organism: Pseudomonas aeruginosa (Carbapenem Resistant) (11-04-23 @ 22:56)      Method Type: CarbaR      -  Resistance Gene to Carbapenem: Nondet  Organism: Pseudomonas aeruginosa (Carbapenem Resistant) (11-04-23 @ 22:56)      Method Type: SIMON      -  Amikacin: S <=16      -  Aztreonam: R >16      -  Cefepime: I 16      -  Ceftazidime: I 16      -  Ceftazidime/Avibactam: S <=4      -  Ceftolozane/tazobactam: S <=2      -  Ciprofloxacin: R >2      -  Gentamicin: R >8      -  Imipenem: R >8      -  Levofloxacin: R >4      -  Meropenem: R >8      -  Piperacillin/Tazobactam: R >64      -  Tobramycin: R >8        RADIOLOGY & ADDITIONAL TESTS: Reviewed.

## 2023-11-24 NOTE — PROGRESS NOTE ADULT - ASSESSMENT
73 paola old with DM, prior CVA, kidney disease  Now with respiratory failure  Requiring HD- but difficulty tolerating    S/p bronch with removal of secretions  Vent setting stable    Leukocytosis Improved    Known colonization with MDRO pseudomonas    Leukocytosis is likely multifactorial.  Her WBC has been elevated since early October. Decreased without antibiotics    She will remain at high risk for recurrent infection including pneumonia, soft tissue injections and blood stream infections.   The risk of infection is higher with temporary lines than with a permcath.     If there is concern for infection repeat blood cultures, sputum culture and imaging. If she has a change in status, would give polymixin, vanco, and flagyl pending imaging and cultures.       Blood culture with MRSE in one bottle. Possible contaminant.  Repeat blood cultures.  If repeat blood culture are positve for MRSE- I would treat with vancoymycin dosed by daily level for 7 days    Her overall prognosis is grave.  Her airways are chronically colonized.  She is at risk for recurrent pna with MDRO organisms and has limited treatment options. Prior cultures with MDR organisms and she had a drug reaction to cephalosporins.  At this time, her respiratory status is stable.     Ultimately, the decision re permcath placement is a risk benefit decision.     Prognosis grave. Options limited  Continue GOC discussions    Case dw ICU  Call ID service with questions

## 2023-11-24 NOTE — PROGRESS NOTE ADULT - ATTENDING COMMENTS
73 year old female with very complex medical history as above. Recent active issue is dialysis access. Right IJ HD catheter placed 11/22. Required higher dose of vasopressor support to tolerate HD. Leukocytosis is improved. Continue to monitor off antibiotics. Blood culture with MRSE in one bottle. Possible contaminant. Repeat blood culture pending. ID input appreciated. Mentation remains minimal off sedation.     Extensively discussed case with patient's family (patient's  and two daughters) at bedside 11/22. Conveyed that the overall prognosis. Patient may not be able to tolerate dialysis given hypotension moving forward. They remain hopeful however and wish to pursue aggressive therapy. Will continue supportive care. Overall prognosis is very poor.

## 2023-11-24 NOTE — PROGRESS NOTE ADULT - ASSESSMENT
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       NEUROLOGY  #AMS  # Multiple embolic strokes   # L PCA Infarct   MR head performed w/ new infarct and old infarcts, EEG without seizure events but with high foci potential   - EEG given facial twitching: negative for epileptiform activity (10/31)  - deferring repeat MRI as unlikely to   - baseline AOX3 at home per daughter, trached/non-verbal   - c/w ASA 81 mg   - continue Lipitor    CARDIOVASCULAR  # Septic vs vasoplegic shock   Etiology likely septic iso active infection. Possible component of vasoplegic, however no longer with active infection or on sedation. Off IV vasopressors.   Current regimen:   - droxidopa 600mg q8h with PRN 600mg ordered preHD  - midodrine 40mg q6 h; trial of additional 40mg prior to HD  - required increased levophed during HD 11/23,  weaned off after dialysis session, will need additional midodrine/droxidopa dose preHD      # HFpEF w/ decompensation   > ECHO w/ EF 59 with severe LVH and diastolic dysfunction   - fluid overloaded, HD to remove fluids     HEENT   # Left ear OE with acute on chronic left-sided otomastoiditis, s/p Abx (see in ID)   - noted to have white discharged/residue, increased from prior per patient's daughter; ENT reconsulted, resumed on cipro drops (10/31 - 11/7),   # 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   - resolved    RESPIRATORY  # acute hypoxic resp failure second to volume overload, ventilator dependence   # Copious inline and oral thick tanned secretions   - CKD vs HFpEF w/ hypercarbia 2/2 volume overload requiring intubation, trach, and HD initiation  - Continue on albuterol and chest PT, cont 3 % INH, IPV  - Continue volume removal with HD  - hypoxemia w/ turning and repositioning, CXR 11/16 shows moderate right pleural effusion, worsening RUL atelectasis, left has small effusion and/or atelectasis  - Bronchoscopy bedside 11/16 noted with white secretions right > left  - f/u BAL     #tracheomalacia   - CT CHEST (9/8) with tracheomalacia, BL pleural effusions and persistent consolidations     GI  # GIB: last pRBC transfused 11/4 (10/14 before that)   - Continue on PPI BID  - patient had melena before 11/13 but H/H stable,  Eliquis was held for dark brown/black stool  - as per nursing staff, stool is black in the rectal system    # Dysphagia   - NGT- tolerating feeds, at goal- changed to nepro was per nutrition    RENAL  # ESRD, L ARTHUR taylor   - HD MWF TIW with midodrine and droxidopa   - ICU for vasopressor assisted volume removal, cap to 1 pressor and not offering CRRT due to the comorbidities and prognosis   - f/u w/ nephrology: will continue to offer patient 1-pressor assisted HD as agreed prior, as long as she can tolerate HD maxed on 1 pressor, Levo to 0.3; she will be considered iHD candidate  - IR was planning PermCath 11/14; but procedure was cancelled due to pt in MICU, on vasopressors, and has leucocytosis  - [facility] ilene discontinued 11/16 for line holiday, trialysis catheter placed in [facility] on 11/17 however was removed 11/18 iso poor flow rates during HD.  - Per IR, patient is not a candidate for a tunneled HD catheter at this time given uptrending leukocytosis  - Jagrutiley placed 11/22, used for HD 11/22    INFECTIOUS DISEASE  #Septic shock  , afebrile,  WBC - 15>27, LA wnls  #blood cx 9/1: MSSA with hypotension  repeat negative 9/4, 9/8 s/p 4 weeks of vanco/dapto through 10/2  #Colonized with  pseudomonas   # MSSA/ESBL E Coli PNA  # L otitis Externa s/p ENT debridement c/b Candida, s/p Meropenem and Fluconazole  # Proteus/ Pseudomonas in sputum, s/p Aztreonam   - recent Bcx 11/2 negative, sputum 11/2 with  pseudomonas- colonized   - repeat MRSA PCR neg  - afebrile now after completing course aztreonam 11/6  - monitoring off abx   - f/u BAL (11/16)- only afb was sent which is negative.   - f/u BCx 11/22    # possible malignant otitis externa   # ESBL ECOLI and MSSA VAP c/b MSSA bacteremia   - hx of MSSA bacteremia, ESBL E. Coli sputum Cx, BAL w/ MSSA  - treated with abx   - eosinophilia workup: JORGE, ANCAs negative    HEME  # Anemia second to GIB vs renal disease   - multiple transfusions, last done 11/4  - Eliquis held 2/2 pt having melena, and now pt with black stools/dark brown  - 11/14  restarted ASA 81 mg   - restarted Heparin SQ prophylaxis 11/14 today, will continue to hold off starting ac given melena.    #Allergic transfusion reaction: per RCU documentation, developed erythematous rash across chest shortly after starting transfusion, blood bank consulted and diagnosed mild allergic rxn  - premedicate w/ benadryl and famotidine prior to future transfusions  - no issue w/ pRBC transfusion 11/4    VASCULAR   # LLE POP DVT   - on Eliquis (held as of 11/11 PM for procedure and also patient having melena), stools are black/dark brown  - restarted Heparin SQ prophylaxis 11/14 and will continue to hold off starting Eliquis given melena.  - SCDs    # HD access  - TRISHA TAYLOR (9/27 - 10/21), replaced 10/24 -11/15  - TRISHA taylor discontinued 11/16 for line holiday, trialysis catheter placed in [facility] on 11/17 however was removed 11/18 iso poor flow rates during HD.  - Shiley placed 11/22, used for HD  -patient is not a candidate for a tunneled HD catheter given uptrending leukocytosis    ONC   # Hx of Breast CA   - Patient dx in 2018 and s/p B/L mastectomy (radical on right) and chemo and RT.     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       NEUROLOGY  #AMS  # Multiple embolic strokes   # L PCA Infarct   MR head performed w/ new infarct and old infarcts, EEG without seizure events but with high foci potential   - EEG given facial twitching: negative for epileptiform activity (10/31)  - deferring repeat MRI as unlikely to   - baseline AOX3 at home per daughter, trached/non-verbal   - c/w ASA 81 mg   - continue Lipitor    CARDIOVASCULAR  # Septic vs vasoplegic shock   Etiology likely septic iso active infection. Possible component of vasoplegic, however no longer with active infection or on sedation. Off IV vasopressors.   Current regimen:   - droxidopa 600mg q8h with PRN 600mg ordered preHD  - midodrine 40mg q6 h; trial of additional 40mg prior to HD  - required levophed during HD 11/23,  now weaned off, will need additional midodrine/droxidopa dose preHD      # HFpEF w/ decompensation   > ECHO w/ EF 59 with severe LVH and diastolic dysfunction   - fluid overloaded, HD to remove fluids     HEENT   # Left ear OE with acute on chronic left-sided otomastoiditis, s/p Abx (see in ID)   - noted to have white discharged/residue, increased from prior per patient's daughter; ENT reconsulted, resumed on cipro drops (10/31 - 11/7),   # 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   - resolved    RESPIRATORY  # acute hypoxic resp failure second to volume overload, ventilator dependence   # Copious inline and oral thick tanned secretions   - CKD vs HFpEF w/ hypercarbia 2/2 volume overload requiring intubation, trach, and HD initiation  - Continue on albuterol and chest PT, cont 3 % INH, IPV  - Continue volume removal with HD  - hypoxemia w/ turning and repositioning, CXR 11/16 shows moderate right pleural effusion, worsening RUL atelectasis, left has small effusion and/or atelectasis  - Bronchoscopy bedside 11/16 noted with white secretions right > left  - f/u BAL     #tracheomalacia   - CT CHEST (9/8) with tracheomalacia, BL pleural effusions and persistent consolidations     GI  # GIB: last pRBC transfused 11/4 (10/14 before that)   - Continue on PPI BID  - patient had melena before 11/13 but H/H stable,  Eliquis was held for dark brown/black stool  - as per nursing staff, stool is black in the rectal system    # Dysphagia   - NGT- tolerating feeds, at goal- changed to nepro was per nutrition    RENAL  # ESRD, L ARTHUR taylor   - HD MWF TIW with midodrine and droxidopa   - ICU for vasopressor assisted volume removal, cap to 1 pressor and not offering CRRT due to the comorbidities and prognosis   - f/u w/ nephrology: will continue to offer patient 1-pressor assisted HD as agreed prior, as long as she can tolerate HD maxed on 1 pressor, Levo to 0.3; she will be considered iHD candidate  - IR was planning PermCath 11/14; but procedure was cancelled due to pt in MICU, on vasopressors, and has leucocytosis  - [facility] ilene discontinued 11/16 for line holiday, trialysis catheter placed in [facility] on 11/17 however was removed 11/18 iso poor flow rates during HD.  - Per IR, patient is not a candidate for a tunneled HD catheter at this time given uptrending leukocytosis  - Jagrutiley placed 11/22 and viable, next HD tentatively planned for 11/25    INFECTIOUS DISEASE  #Septic shock  , afebrile,  WBC - 15>27, LA wnls  #blood cx 9/1: MSSA with hypotension  repeat negative 9/4, 9/8 s/p 4 weeks of vanco/dapto through 10/2  #Colonized with  pseudomonas   # MSSA/ESBL E Coli PNA  # L otitis Externa s/p ENT debridement c/b Candida, s/p Meropenem and Fluconazole  # Proteus/ Pseudomonas in sputum, s/p Aztreonam   - recent Bcx 11/2 negative, sputum 11/2 with  pseudomonas- colonized   - repeat MRSA PCR neg  - afebrile now after completing course aztreonam 11/6  - monitoring off abx   - f/u BAL (11/16)- only afb was sent which is negative.   - BCx 11/22 with Gram positive anaerobes in 1 bottle, likely contaminant, will repeat    # possible malignant otitis externa   # ESBL ECOLI and MSSA VAP c/b MSSA bacteremia   - hx of MSSA bacteremia, ESBL E. Coli sputum Cx, BAL w/ MSSA  - treated with abx   - eosinophilia workup: JORGE, ANCAs negative    HEME  # Anemia second to GIB vs renal disease   - multiple transfusions, last done 11/4  - Eliquis held 2/2 pt having melena, and now pt with black stools/dark brown  - c/w ASA 81mg  - c/w Heparin SQ     #Allergic transfusion reaction: per RCU documentation, developed erythematous rash across chest shortly after starting transfusion, blood bank consulted and diagnosed mild allergic rxn  - premedicate w/ benadryl and famotidine prior to future transfusions  - no issue w/ pRBC transfusion 11/4    VASCULAR   # LLE POP DVT   - on Eliquis (held as of 11/11 PM for procedure and also patient having melena), stools are black/dark brown  - restarted Heparin SQ prophylaxis 11/14 and will continue to hold off starting Eliquis given melena.  - SCDs    # HD access  - TRISHA TAYLOR (9/27 - 10/21), replaced 10/24 -11/15  - TRISHA taylor discontinued 11/16 for line holiday, trialysis catheter placed in [facility] on 11/17 however was removed 11/18 iso poor flow rates during HD.  - Shiley placed 11/22, viable for HD  -patient deemed not a candidate for a tunneled HD catheter given uptrending leukocytosis    ONC   # Hx of Breast CA   - Patient dx in 2018 and s/p B/L mastectomy (radical on right) and chemo and RT.     ENDOCRINE  # IDDM2   - Continue on NPH 6U with moderate ISS   - Adjust as needed    SKIN  #Rash  - Small vesicular rash to abdomen which is old, will CTM    ETHICS/ GOC    - Attempted palliative discussion however family not interested and wishes for FULL CODE  - Family continues to want everything done despite inability to tolerate HD on multiple oral pressor  - Levo capped at 0.3 during HD

## 2023-11-25 NOTE — PROGRESS NOTE ADULT - SUBJECTIVE AND OBJECTIVE BOX
NEPHROLOGY     Patient seen and examined trach to vent,      MEDICATIONS  (STANDING):  albuterol/ipratropium for Nebulization 3 milliLiter(s) Nebulizer every 6 hours  artificial tears (preservative free) Ophthalmic Solution 1 Drop(s) Both EYES every 4 hours  aspirin  chewable 81 milliGRAM(s) Enteral Tube daily  atorvastatin 10 milliGRAM(s) Oral at bedtime  calamine/zinc oxide Lotion 1 Application(s) Topical daily  chlorhexidine 0.12% Liquid 15 milliLiter(s) Oral Mucosa every 12 hours  chlorhexidine 2% Cloths 1 Application(s) Topical daily  chlorhexidine 4% Liquid 1 Application(s) Topical <User Schedule>  dextrose 5%. 1000 milliLiter(s) (50 mL/Hr) IV Continuous <Continuous>  dextrose 5%. 1000 milliLiter(s) (100 mL/Hr) IV Continuous <Continuous>  dextrose 50% Injectable 12.5 Gram(s) IV Push once  dextrose 50% Injectable 25 Gram(s) IV Push once  dextrose 50% Injectable 25 Gram(s) IV Push once  dextrose 50% Injectable 25 Gram(s) IV Push once  droxidopa 600 milliGRAM(s) Oral every 8 hours  epoetin odalis (EPOGEN) Injectable 40706 Unit(s) IV Push <User Schedule>  ferrous    sulfate Liquid 300 milliGRAM(s) Oral daily  glucagon  Injectable 1 milliGRAM(s) IntraMuscular once  heparin   Injectable 5000 Unit(s) SubCutaneous every 8 hours  insulin lispro (ADMELOG) corrective regimen sliding scale   SubCutaneous every 6 hours  insulin NPH human recombinant 4 Unit(s) SubCutaneous every 6 hours  midodrine. 40 milliGRAM(s) Oral <User Schedule>  norepinephrine Infusion 0.05 MICROgram(s)/kG/Min (6.23 mL/Hr) IV Continuous <Continuous>  pantoprazole  Injectable 40 milliGRAM(s) IV Push two times a day  petrolatum Ophthalmic Ointment 1 Application(s) Both EYES four times a day  sodium chloride 1 Gram(s) Oral two times a day  sodium chloride 3%  Inhalation 4 milliLiter(s) Inhalation every 6 hours    VITALS:  T(C): , Max: 37.1 (11-24-23 @ 08:00)  T(F): , Max: 98.7 (11-24-23 @ 08:00)  HR: 109 (11-24-23 @ 08:00)  RR: 14 (11-24-23 @ 08:00)  SpO2: 100% (11-24-23 @ 08:00)    I and O's:    11-23 @ 07:01  -  11-24 @ 07:00  --------------------------------------------------------  IN: 1060 mL / OUT: 400 mL / NET: 660 mL    PHYSICAL EXAM:  Constitutional: critically ill, trach to vent   HEENT: + NGT, + tracheostomy   Respiratory: coarse BS  Cardiovascular: tachy s1s2  Gastrointestinal: BS+, soft, NT/ND  Extremities: ++ generalized edema  : No Wheeler  Skin: No rashes  Access: [facility]     LABS:                        8.1    14.26 )-----------( 410      ( 24 Nov 2023 00:30 )             27.5     11-24    133<L>  |  97<L>  |  43<H>  ----------------------------<  197<H>  3.9   |  22  |  1.95<H>    Ca    8.8      24 Nov 2023 00:30  Phos  3.8     11-24  Mg     2.30     11-24    TPro  5.9<L>  /  Alb  1.6<L>  /  TBili  <0.2  /  DBili  x   /  AST  26  /  ALT  18  /  AlkPhos  424<H>  11-24     NEPHROLOGY     Patient seen and examined trach to paula,  on levo during dialysis UF 2.4 liters    MEDICATIONS  (STANDING):  albuterol/ipratropium for Nebulization 3 milliLiter(s) Nebulizer every 6 hours  artificial tears (preservative free) Ophthalmic Solution 1 Drop(s) Both EYES every 4 hours  aspirin  chewable 81 milliGRAM(s) Enteral Tube daily  atorvastatin 10 milliGRAM(s) Oral at bedtime  calamine/zinc oxide Lotion 1 Application(s) Topical daily  chlorhexidine 0.12% Liquid 15 milliLiter(s) Oral Mucosa every 12 hours  chlorhexidine 2% Cloths 1 Application(s) Topical daily  chlorhexidine 4% Liquid 1 Application(s) Topical <User Schedule>  dextrose 5%. 1000 milliLiter(s) (50 mL/Hr) IV Continuous <Continuous>  dextrose 5%. 1000 milliLiter(s) (100 mL/Hr) IV Continuous <Continuous>  dextrose 50% Injectable 12.5 Gram(s) IV Push once  dextrose 50% Injectable 25 Gram(s) IV Push once  dextrose 50% Injectable 25 Gram(s) IV Push once  dextrose 50% Injectable 25 Gram(s) IV Push once  droxidopa 600 milliGRAM(s) Oral every 8 hours  epoetin odalis (EPOGEN) Injectable 27998 Unit(s) IV Push <User Schedule>  ferrous    sulfate Liquid 300 milliGRAM(s) Oral daily  glucagon  Injectable 1 milliGRAM(s) IntraMuscular once  heparin   Injectable 5000 Unit(s) SubCutaneous every 8 hours  insulin lispro (ADMELOG) corrective regimen sliding scale   SubCutaneous every 6 hours  insulin NPH human recombinant 4 Unit(s) SubCutaneous every 6 hours  midodrine. 40 milliGRAM(s) Oral <User Schedule>  norepinephrine Infusion 0.05 MICROgram(s)/kG/Min (6.23 mL/Hr) IV Continuous <Continuous>  pantoprazole  Injectable 40 milliGRAM(s) IV Push two times a day  petrolatum Ophthalmic Ointment 1 Application(s) Both EYES four times a day  sodium chloride 1 Gram(s) Oral two times a day  sodium chloride 3%  Inhalation 4 milliLiter(s) Inhalation every 6 hours    VITALS:  T(C): , Max: 37.1 (11-24-23 @ 08:00)  T(F): , Max: 98.7 (11-24-23 @ 08:00)  HR: 109 (11-24-23 @ 08:00)  RR: 14 (11-24-23 @ 08:00)  SpO2: 100% (11-24-23 @ 08:00)    I and O's:    11-23 @ 07:01  -  11-24 @ 07:00  --------------------------------------------------------  IN: 1060 mL / OUT: 400 mL / NET: 660 mL    PHYSICAL EXAM:  Constitutional: critically ill, trach to vent   HEENT: + NGT, + tracheostomy   Respiratory: coarse BS  Cardiovascular: tachy s1s2  Gastrointestinal: BS+, soft, NT/ND  Extremities: ++ generalized edema  : No Wheeler  Skin: No rashes  Access: [facility]     LABS:                        8.1    14.26 )-----------( 410      ( 24 Nov 2023 00:30 )             27.5     11-24    133<L>  |  97<L>  |  43<H>  ----------------------------<  197<H>  3.9   |  22  |  1.95<H>    Ca    8.8      24 Nov 2023 00:30  Phos  3.8     11-24  Mg     2.30     11-24    TPro  5.9<L>  /  Alb  1.6<L>  /  TBili  <0.2  /  DBili  x   /  AST  26  /  ALT  18  /  AlkPhos  424<H>  11-24

## 2023-11-25 NOTE — PROGRESS NOTE ADULT - SUBJECTIVE AND OBJECTIVE BOX
INTERVAL HPI/OVERNIGHT EVENTS:    SUBJECTIVE: Patient seen and examined at bedside. No acute overnight events. Mentation unchanged.     ROS: All negative except as listed above.    VITAL SIGNS:  ICU Vital Signs Last 24 Hrs  T(C): 37 (25 Nov 2023 04:00), Max: 37.3 (24 Nov 2023 16:00)  T(F): 98.6 (25 Nov 2023 04:00), Max: 99.2 (24 Nov 2023 16:00)  HR: 106 (25 Nov 2023 06:00) (102 - 119)  BP: --  BP(mean): --  ABP: 125/42 (25 Nov 2023 06:00) (110/35 - 133/43)  ABP(mean): 76 (25 Nov 2023 06:00) (63 - 80)  RR: 22 (25 Nov 2023 06:00) (12 - 25)  SpO2: 100% (25 Nov 2023 06:00) (97% - 100%)    O2 Parameters below as of 25 Nov 2023 06:00  Patient On (Oxygen Delivery Method): ventilator    O2 Concentration (%): 40      Mode: AC/ CMV (Assist Control/ Continuous Mandatory Ventilation), RR (machine): 24, FiO2: 50, PEEP: 8, ITime: 0.8, MAP: 20, PC: 35, PIP: 44  Plateau pressure:   P/F ratio:     11-24 @ 07:01  -  11-25 @ 07:00  --------------------------------------------------------  IN: 750 mL / OUT: 100 mL / NET: 650 mL      CAPILLARY BLOOD GLUCOSE      POCT Blood Glucose.: 220 mg/dL (25 Nov 2023 06:22)      ECG: reviewed.    PHYSICAL EXAM:    CONSTITUTIONAL: Trached, critically ill    RESPIRATORY: +vent and coarse breath sounds auscultated  CARDIOVASCULAR: Tachycardic and regular rhythm, normal S1 and S2, no murmur/rub/gallop;  Peripheral pulses are 2+ bilaterally  ABDOMEN: +NGT. distended but soft. Nontender to palpation, normoactive bowel sounds, no rebound/guarding; No hepatosplenomegaly. Rectal tube w/ black output  MSK: no clubbing or cyanosis of digits; no joint swelling or tenderness to palpation  Extremities: Anasarca. 3+ BLE edema to knees, 1+ edema of bilateral thighs. 3+ edema of bilateral forearms  Neuro: Does not respond to verbal or physical stimuli. Does not follow commands    MEDICATIONS:  MEDICATIONS  (STANDING):  albuterol/ipratropium for Nebulization 3 milliLiter(s) Nebulizer every 6 hours  artificial tears (preservative free) Ophthalmic Solution 1 Drop(s) Both EYES every 4 hours  aspirin  chewable 81 milliGRAM(s) Enteral Tube daily  atorvastatin 10 milliGRAM(s) Oral at bedtime  calamine/zinc oxide Lotion 1 Application(s) Topical daily  chlorhexidine 0.12% Liquid 15 milliLiter(s) Oral Mucosa every 12 hours  chlorhexidine 2% Cloths 1 Application(s) Topical daily  chlorhexidine 4% Liquid 1 Application(s) Topical <User Schedule>  dextrose 5%. 1000 milliLiter(s) (50 mL/Hr) IV Continuous <Continuous>  dextrose 5%. 1000 milliLiter(s) (100 mL/Hr) IV Continuous <Continuous>  dextrose 50% Injectable 12.5 Gram(s) IV Push once  dextrose 50% Injectable 25 Gram(s) IV Push once  dextrose 50% Injectable 25 Gram(s) IV Push once  dextrose 50% Injectable 25 Gram(s) IV Push once  droxidopa 600 milliGRAM(s) Oral every 8 hours  epoetin odalis (EPOGEN) Injectable 08380 Unit(s) IV Push <User Schedule>  ferrous    sulfate Liquid 300 milliGRAM(s) Oral daily  glucagon  Injectable 1 milliGRAM(s) IntraMuscular once  heparin   Injectable 5000 Unit(s) SubCutaneous every 8 hours  insulin lispro (ADMELOG) corrective regimen sliding scale   SubCutaneous every 6 hours  insulin NPH human recombinant 4 Unit(s) SubCutaneous every 6 hours  midodrine. 40 milliGRAM(s) Oral <User Schedule>  norepinephrine Infusion 0.05 MICROgram(s)/kG/Min (6.23 mL/Hr) IV Continuous <Continuous>  pantoprazole  Injectable 40 milliGRAM(s) IV Push two times a day  petrolatum Ophthalmic Ointment 1 Application(s) Both EYES four times a day  sodium chloride 1 Gram(s) Oral two times a day  sodium chloride 3%  Inhalation 4 milliLiter(s) Inhalation every 6 hours    MEDICATIONS  (PRN):  acetaminophen   Oral Liquid .. 650 milliGRAM(s) Oral every 6 hours PRN Temp greater or equal to 38C (100.4F), Mild Pain (1 - 3)  dextrose Oral Gel 15 Gram(s) Oral once PRN Blood Glucose LESS THAN 70 milliGRAM(s)/deciliter  diphenhydrAMINE Elixir 50 milliGRAM(s) Oral once PRN Rash and/or Itching  droxidopa 600 milliGRAM(s) Oral <User Schedule> PRN prior to hemodialysis  midodrine. 40 milliGRAM(s) Oral once PRN 1 Hour Pre HD  sodium chloride 0.9% Bolus. 250 milliLiter(s) IV Bolus every 5 minutes PRN SBP LESS THAN or EQUAL to 90 mmHg  sodium chloride 0.9% lock flush 10 milliLiter(s) IV Push every 1 hour PRN Pre/post blood products, medications, blood draw, and to maintain line patency      ALLERGIES:  Allergies    isoniazid (Rash)  nafcillin (Unknown)  hydrALAZINE (Rash)  vitamin E (Short breath; Urticaria; Hives)  doxycycline (Rash)  cefepime (Rash)  NIFEdipine (Urticaria; Hives)  Zosyn (Rash)    Intolerances        LABS:                        7.7    16.76 )-----------( 440      ( 25 Nov 2023 02:30 )             24.6     11-25    133<L>  |  97<L>  |  49<H>  ----------------------------<  186<H>  4.0   |  25  |  2.21<H>    Ca    8.8      25 Nov 2023 02:30  Phos  4.1     11-25  Mg     2.30     11-25    TPro  5.5<L>  /  Alb  1.7<L>  /  TBili  <0.2  /  DBili  x   /  AST  15  /  ALT  15  /  AlkPhos  352<H>  11-25      Urinalysis Basic - ( 25 Nov 2023 02:30 )    Color: x / Appearance: x / SG: x / pH: x  Gluc: 186 mg/dL / Ketone: x  / Bili: x / Urobili: x   Blood: x / Protein: x / Nitrite: x   Leuk Esterase: x / RBC: x / WBC x   Sq Epi: x / Non Sq Epi: x / Bacteria: x      ABG:      vBG:    Micro:    Culture - Blood (collected 11-22-23 @ 09:30)  Source: .Blood Blood-Peripheral  Preliminary Report (11-24-23 @ 17:01):    No growth at 48 Hours    Culture - Blood (collected 11-22-23 @ 09:00)  Source: .Blood Blood-Venous  Gram Stain (11-23-23 @ 22:30):    Growth in anaerobic bottle: Gram positive cocci in pairs  Final Report (11-24-23 @ 20:04):    Growth in anaerobic bottle: Staphylococcus epidermidis    Coagulase Negative Staphylococci isolated from a single blood culture set    may represent contamination.    Contact the Microbiology Department at [phone] if susceptibility    testing is clinically indicated.    Direct identification is available within approximately 3-5    hours either by Blood Panel Multiplexed PCR or Direct    MALDI-TOF. Details: [email or web address]  Organism: Blood Culture PCR (11-24-23 @ 20:04)  Organism: Blood Culture PCR (11-24-23 @ 20:04)      Method Type: PCR      -  Staphylococcus epidermidis, Methicillin resistant: Detec    Culture - Blood (collected 11-02-23 @ 09:10)  Source: .Blood Blood-Peripheral  Final Report (11-07-23 @ 12:01):    No growth at 5 days    Culture - Blood (collected 11-02-23 @ 09:00)  Source: .Blood Blood-Peripheral  Final Report (11-07-23 @ 12:01):    No growth at 5 days        Culture - Sputum (collected 11-02-23 @ 10:24)  Source: ET Tube ET Tube  Gram Stain (11-02-23 @ 15:31):    Moderate polymorphonuclear leukocytes per low power field    No Squamous epithelial cells per low power field    Moderate Gram Negative Rods per oil power field  Final Report (11-04-23 @ 22:56):    Numerous Pseudomonas aeruginosa (Carbapenem Resistant)  Organism: Pseudomonas aeruginosa (Carbapenem Resistant) (11-04-23 @ 22:56)  Organism: Pseudomonas aeruginosa (Carbapenem Resistant) (11-04-23 @ 22:56)      Method Type: CarbaR      -  Resistance Gene to Carbapenem: Nondet  Organism: Pseudomonas aeruginosa (Carbapenem Resistant) (11-04-23 @ 22:56)      Method Type: SIMON      -  Amikacin: S <=16      -  Aztreonam: R >16      -  Cefepime: I 16      -  Ceftazidime: I 16      -  Ceftazidime/Avibactam: S <=4      -  Ceftolozane/tazobactam: S <=2      -  Ciprofloxacin: R >2      -  Gentamicin: R >8      -  Imipenem: R >8      -  Levofloxacin: R >4      -  Meropenem: R >8      -  Piperacillin/Tazobactam: R >64      -  Tobramycin: R >8        RADIOLOGY & ADDITIONAL TESTS: Reviewed.

## 2023-11-25 NOTE — PROGRESS NOTE ADULT - ASSESSMENT

## 2023-11-25 NOTE — PROGRESS NOTE ADULT - SUBJECTIVE AND OBJECTIVE BOX
Subjective: Patient seen and examined. No new events except as noted.   remains in ICU    REVIEW OF SYSTEMS:  Unable to obtain  MEDICATIONS:  MEDICATIONS  (STANDING):  albuterol/ipratropium for Nebulization 3 milliLiter(s) Nebulizer every 6 hours  artificial tears (preservative free) Ophthalmic Solution 1 Drop(s) Both EYES every 4 hours  aspirin  chewable 81 milliGRAM(s) Enteral Tube daily  atorvastatin 10 milliGRAM(s) Oral at bedtime  calamine/zinc oxide Lotion 1 Application(s) Topical daily  chlorhexidine 0.12% Liquid 15 milliLiter(s) Oral Mucosa every 12 hours  chlorhexidine 2% Cloths 1 Application(s) Topical daily  chlorhexidine 4% Liquid 1 Application(s) Topical <User Schedule>  dextrose 5%. 1000 milliLiter(s) (50 mL/Hr) IV Continuous <Continuous>  dextrose 5%. 1000 milliLiter(s) (100 mL/Hr) IV Continuous <Continuous>  dextrose 50% Injectable 25 Gram(s) IV Push once  dextrose 50% Injectable 25 Gram(s) IV Push once  dextrose 50% Injectable 25 Gram(s) IV Push once  dextrose 50% Injectable 12.5 Gram(s) IV Push once  droxidopa 600 milliGRAM(s) Oral every 8 hours  epoetin odalis (EPOGEN) Injectable 44539 Unit(s) IV Push <User Schedule>  ferrous    sulfate Liquid 300 milliGRAM(s) Oral daily  glucagon  Injectable 1 milliGRAM(s) IntraMuscular once  heparin   Injectable 5000 Unit(s) SubCutaneous every 8 hours  insulin lispro (ADMELOG) corrective regimen sliding scale   SubCutaneous every 6 hours  insulin NPH human recombinant 4 Unit(s) SubCutaneous every 6 hours  midodrine. 40 milliGRAM(s) Oral <User Schedule>  norepinephrine Infusion 0.05 MICROgram(s)/kG/Min (6.23 mL/Hr) IV Continuous <Continuous>  pantoprazole  Injectable 40 milliGRAM(s) IV Push two times a day  petrolatum Ophthalmic Ointment 1 Application(s) Both EYES four times a day  sodium chloride 1 Gram(s) Oral two times a day  sodium chloride 3%  Inhalation 4 milliLiter(s) Inhalation every 6 hours      PHYSICAL EXAM:  T(C): 36.6 (11-25-23 @ 16:00), Max: 37.2 (11-25-23 @ 00:00)  HR: 122 (11-25-23 @ 18:00) (102 - 133)  BP: --  RR: 15 (11-25-23 @ 18:00) (9 - 26)  SpO2: 99% (11-25-23 @ 18:00) (96% - 100%)  Wt(kg): --  I&O's Summary    24 Nov 2023 07:01  -  25 Nov 2023 07:00  --------------------------------------------------------  IN: 750 mL / OUT: 100 mL / NET: 650 mL    25 Nov 2023 07:01  -  25 Nov 2023 19:11  --------------------------------------------------------  IN: 713 mL / OUT: 2800 mL / NET: -2087 mL          Appearance: NAD, +trach  +NGT  HEENT: dry oral mucosa  Lymphatic: No lymphadenopathy  Cardiovascular: Normal S1 S2, No JVD, No murmurs, No edema  Respiratory: Decreased BS, + trach to vent	  Neuro: opens eyes  Gastrointestinal: Soft, Non-tender, + BS, + NGT  Skin: No rashes, No ecchymoses, No cyanosis	  Extremities: No strength/ROM 2/2 sedation, + BL LE edema  Vascular: Peripheral pulses palpable 2+ bilaterally  Anasarca   Mode: AC/ CMV (Assist Control/ Continuous Mandatory Ventilation), RR (machine): 24, FiO2: 40, PEEP: 8, ITime: 0.8, MAP: 19, PC: 35, PIP: 38  LABS:    CARDIAC MARKERS:                                7.7    16.76 )-----------( 440      ( 25 Nov 2023 02:30 )             24.6     11-25    133<L>  |  97<L>  |  49<H>  ----------------------------<  186<H>  4.0   |  25  |  2.21<H>    Ca    8.8      25 Nov 2023 02:30  Phos  4.1     11-25  Mg     2.30     11-25    TPro  5.5<L>  /  Alb  1.7<L>  /  TBili  <0.2  /  DBili  x   /  AST  15  /  ALT  15  /  AlkPhos  352<H>  11-25          TELEMETRY: 	SR    ECG:  	  RADIOLOGY:   DIAGNOSTIC TESTING:  [ ] Echocardiogram:  [ ]  Catheterization:  [ ] Stress Test:    OTHER:

## 2023-11-25 NOTE — PROGRESS NOTE ADULT - ASSESSMENT
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 Last dialyzed Wednesday     (2)CV- tenuous hemodynamics - intermittently requiring pressor gtt/on q6h Midodrine     (3)Pulm- trach/vent-dependent    (4)Anemia- on Epogen with HD    (5)GOC - s/p repeated discussions with family regarding GOC - family persisting with interest in aggressive measures.     RECOMMEND:  (1)Next HD today : 3hrs, 2.4 kg UF  (2)Epo with HD    Nilda Espinoza DNP  [facility]  [phone]     RENAL ATTENDING NOTE  Patient seen and examined with NP. Agree with assessment and plan as above.    Jimmy Colon MD  [facility]  [phone]   IMPRESSION: 73F w/ HTN, DM2, CVA, breast CA-bilateral mastectomy, recurrent aspiration pneumonia/respiratory failure, and CKD, 8/11/23 p/w acute hypercapnic respiratory failure; c/b RUSS    (1)Renal - RUSS on CKD4 ==>newly ESRD-HD as of this admission.     (2)CV- tenuous hemodynamics - intermittently requiring pressor gtt/on q6h Midodrine     (3)Pulm- trach/vent-dependent    (4)Anemia- on Epogen with HD    (5)GOC - s/p repeated discussions with family regarding GOC - family persisting with interest in aggressive measures.     RECOMMEND:  (1)Next HD today : 3hrs, 2.4 kg UF  (2)Epo with HD  3) mild pressers during Hd ,wean as able   4)prognosis guarded            Rachael Cali  [facility]  Office: [phone]

## 2023-11-25 NOTE — PROGRESS NOTE ADULT - ATTENDING COMMENTS
72 yo F PMH T2DM, HTN, CKD now dialysis dependent, HFpEF, breast ca s/p bilateral mastectomy, chemotherapy, and radiation (2018), hx of cardiac arrest (2018), L pca occipital cva here with prolonged hospitalization c/b chronic respiratory failure, renal failure, MDR infections, and AMS. She is now in persistent shock requiring oral vasopressors with minimal improvement in mental status.    - previously aborted HD due to significant hypotension, plan for repeat trial today  - s/p abx, no clinical signs of worsening infection. monitor off abx. BCx from 11/22 with MRSDANI, possible contaminant. Follow up repeat cultures. Appreciate ID recs  - off IV vasopressors at this time. appreciate nephrology recommendations  - ongoing GOC  - prognosis guarded

## 2023-11-26 NOTE — PROGRESS NOTE ADULT - ASSESSMENT

## 2023-11-26 NOTE — PROGRESS NOTE ADULT - ASSESSMENT
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 Last HD 11/25, 3 hours of Hemodialysis treatment. Removed 2.4 Kg of fluid    (2)CV- tenuous hemodynamics - intermittently requiring pressor gtt/on q6h Midodrine. Currently on pressor mgmt    (3)Pulm- trach/vent-dependent    (4)Anemia- on Epogen with HD    (5)GOC - s/p repeated discussions with family regarding GOC - family persisting with interest in aggressive measures.     RECOMMEND:  (1)Next HD planned for Tuesday   (2)Epo with HD  (3) Mild pressers during Hd ,wean as able  (4) C/w Salt tabs via NGT   4) Prognosis guarded        Lizzy Harp  [facility]  Office: [phone]

## 2023-11-26 NOTE — PROGRESS NOTE ADULT - SUBJECTIVE AND OBJECTIVE BOX
Patient seen and examined at bedside. Remains in CTI, + trach, on pressors and sedation. S/p HD yesterday, Received 3 hours of Hemodialysis treatment. Removed 2.4 Kg of fluid. HR post dialysis up to 130      VITAL:  T(C): , Max: 37.7 (11-25-23 @ 20:00)  T(F): , Max: 99.9 (11-25-23 @ 20:00)  HR: 94 (11-26-23 @ 10:39)  BP: --  BP(mean): --  RR: 15 (11-26-23 @ 09:00)  SpO2: 100% (11-26-23 @ 10:39)  Wt(kg): --      PHYSICAL EXAM:  Constitutional: critically ill, trach to vent   HEENT: + NGT, + tracheostomy   Respiratory: coarse BS  Cardiovascular: tachy s1s2  Gastrointestinal: BS+, soft, NT/ND  Extremities: ++ generalized edema  : No Wheeler  Skin: No rashes  Access: [facility]       LABS:                        8.0    17.34 )-----------( 471      ( 26 Nov 2023 01:05 )             27.0     Na(136)/K(3.6)/Cl(100)/HCO3(26)/BUN(32)/Cr(1.67)Glu(176)/Ca(8.7)/Mg(2.10)/PO4(2.9)    11-26 @ 01:05  Na(133)/K(4.0)/Cl(97)/HCO3(25)/BUN(49)/Cr(2.21)Glu(186)/Ca(8.8)/Mg(2.30)/PO4(4.1)    11-25 @ 02:30  Na(133)/K(3.9)/Cl(97)/HCO3(22)/BUN(43)/Cr(1.95)Glu(197)/Ca(8.8)/Mg(2.30)/PO4(3.8)    11-24 @ 00:30    Urinalysis Basic - ( 26 Nov 2023 01:05 )    Color: x / Appearance: x / SG: x / pH: x  Gluc: 176 mg/dL / Ketone: x  / Bili: x / Urobili: x   Blood: x / Protein: x / Nitrite: x   Leuk Esterase: x / RBC: x / WBC x   Sq Epi: x / Non Sq Epi: x / Bacteria: x

## 2023-11-26 NOTE — PROGRESS NOTE ADULT - SUBJECTIVE AND OBJECTIVE BOX
Subjective: Patient seen and examined. No new events except as noted.   remains in ICU     REVIEW OF SYSTEMS:  Unable to obtain       MEDICATIONS:  MEDICATIONS  (STANDING):  albuterol/ipratropium for Nebulization 3 milliLiter(s) Nebulizer every 6 hours  artificial tears (preservative free) Ophthalmic Solution 1 Drop(s) Both EYES every 4 hours  aspirin  chewable 81 milliGRAM(s) Enteral Tube daily  atorvastatin 10 milliGRAM(s) Oral at bedtime  calamine/zinc oxide Lotion 1 Application(s) Topical daily  chlorhexidine 0.12% Liquid 15 milliLiter(s) Oral Mucosa every 12 hours  chlorhexidine 2% Cloths 1 Application(s) Topical daily  chlorhexidine 4% Liquid 1 Application(s) Topical <User Schedule>  dextrose 5%. 1000 milliLiter(s) (100 mL/Hr) IV Continuous <Continuous>  dextrose 5%. 1000 milliLiter(s) (50 mL/Hr) IV Continuous <Continuous>  dextrose 50% Injectable 12.5 Gram(s) IV Push once  dextrose 50% Injectable 25 Gram(s) IV Push once  dextrose 50% Injectable 25 Gram(s) IV Push once  dextrose 50% Injectable 25 Gram(s) IV Push once  droxidopa 600 milliGRAM(s) Oral every 8 hours  epoetin odalis (EPOGEN) Injectable 15621 Unit(s) IV Push <User Schedule>  ferrous    sulfate Liquid 300 milliGRAM(s) Oral daily  glucagon  Injectable 1 milliGRAM(s) IntraMuscular once  heparin   Injectable 5000 Unit(s) SubCutaneous every 8 hours  insulin lispro (ADMELOG) corrective regimen sliding scale   SubCutaneous every 6 hours  insulin NPH human recombinant 4 Unit(s) SubCutaneous every 6 hours  midodrine. 40 milliGRAM(s) Oral <User Schedule>  norepinephrine Infusion 0.05 MICROgram(s)/kG/Min (6.23 mL/Hr) IV Continuous <Continuous>  pantoprazole  Injectable 40 milliGRAM(s) IV Push two times a day  petrolatum Ophthalmic Ointment 1 Application(s) Both EYES four times a day  propofol Infusion 20 MICROgram(s)/kG/Min (7.98 mL/Hr) IV Continuous <Continuous>  sodium chloride 1 Gram(s) Oral two times a day  sodium chloride 3%  Inhalation 4 milliLiter(s) Inhalation every 6 hours      PHYSICAL EXAM:  T(C): 37.4 (11-26-23 @ 08:00), Max: 37.7 (11-25-23 @ 20:00)  HR: 101 (11-26-23 @ 09:00) (89 - 133)  BP: --  RR: 15 (11-26-23 @ 09:00) (11 - 35)  SpO2: 100% (11-26-23 @ 09:00) (96% - 100%)  Wt(kg): --  I&O's Summary    25 Nov 2023 07:01  -  26 Nov 2023 07:00  --------------------------------------------------------  IN: 1374.9 mL / OUT: 2850 mL / NET: -1475.1 mL    26 Nov 2023 07:01  -  26 Nov 2023 10:14  --------------------------------------------------------  IN: 108.4 mL / OUT: 0 mL / NET: 108.4 mL            Appearance: NAD, +trach  +NGT  HEENT: dry oral mucosa  Lymphatic: No lymphadenopathy  Cardiovascular: Normal S1 S2, No JVD, No murmurs, No edema  Respiratory: Decreased BS, + trach to vent	  Neuro: opens eyes  Gastrointestinal: Soft, Non-tender, + BS, + NGT  Skin: No rashes, No ecchymoses, No cyanosis	  Extremities: No strength/ROM 2/2 sedation, + BL LE edema  Vascular: Peripheral pulses palpable 2+ bilaterally  Anasarca   Mode: AC/ CMV (Assist Control/ Continuous Mandatory Ventilation), RR (machine): 24, FiO2: 40, PEEP: 8, ITime: 0.8, MAP: 19, PC: 35, PIP: 43  Plateau pressure:   P/F ratio:           LABS:    CARDIAC MARKERS:                                8.0    17.34 )-----------( 471      ( 26 Nov 2023 01:05 )             27.0     11-26    136  |  100  |  32<H>  ----------------------------<  176<H>  3.6   |  26  |  1.67<H>    Ca    8.7      26 Nov 2023 01:05  Phos  2.9     11-26  Mg     2.10     11-26    TPro  6.2  /  Alb  1.7<L>  /  TBili  <0.2  /  DBili  x   /  AST  15  /  ALT  11  /  AlkPhos  343<H>  11-26    proBNP:   Lipid Profile:   HgA1c:   TSH:             TELEMETRY: 	 SR ST    ECG:  	  RADIOLOGY:   DIAGNOSTIC TESTING:  [ ] Echocardiogram:  [ ]  Catheterization:  [ ] Stress Test:    OTHER:

## 2023-11-26 NOTE — PROGRESS NOTE ADULT - ATTENDING COMMENTS
74 yo F PMH T2DM, HTN, CKD now dialysis dependent, HFpEF, breast ca s/p bilateral mastectomy, chemotherapy, and radiation (2018), hx of cardiac arrest (2018), L pca occipital cva here with prolonged hospitalization c/b chronic respiratory failure, renal failure, MDR infections, and AMS. She is now in persistent shock requiring oral vasopressors with minimal improvement in mental status.    - previously aborted HD due to significant hypotension, able to tolerate HD 11/25 with low dose levophed. Continue to monitor as tolerated  - s/p abx, no clinical signs of worsening infection. monitor off abx. BCx from 11/22 with MRSE, possible contaminant. Repeat cultures NGTD. Appreciate ID recs  - low dose vasopressors likely 2/2 vasoplegia from propofol. Plan to wean propofol.   - ongoing GOC  - prognosis guarded .

## 2023-11-27 NOTE — PROGRESS NOTE ADULT - ASSESSMENT
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       NEUROLOGY  #AMS  # Multiple embolic strokes   # L PCA Infarct   MR head performed w/ new infarct and old infarcts, EEG without seizure events but with high foci potential   - EEG given facial twitching: negative for epileptiform activity (10/31)  - deferring repeat MRI as unlikely to   - baseline AOX3 at home per daughter, trached/non-verbal   - c/w ASA 81 mg   - continue Lipitor    CARDIOVASCULAR  # Septic vs vasoplegic shock   Etiology likely septic iso active infection. Possible component of vasoplegic, however no longer with active infection or on sedation. Off IV vasopressors.   Current regimen:   - droxidopa 600mg q8h with PRN 600mg ordered preHD  - midodrine 40mg q6 h; trial of additional 40mg prior to HD  - required levophed during HD 11/23,  now weaned off, will need additional midodrine/droxidopa dose preHD      # HFpEF w/ decompensation   > ECHO w/ EF 59 with severe LVH and diastolic dysfunction   - fluid overloaded, HD to remove fluids     HEENT   # Left ear OE with acute on chronic left-sided otomastoiditis, s/p Abx (see in ID)   - noted to have white discharged/residue, increased from prior per patient's daughter; ENT reconsulted, resumed on cipro drops (10/31 - 11/7),   # 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   - resolved    RESPIRATORY  # acute hypoxic resp failure second to volume overload, ventilator dependence   # Copious inline and oral thick tanned secretions   - CKD vs HFpEF w/ hypercarbia 2/2 volume overload requiring intubation, trach, and HD initiation  - Continue on albuterol and chest PT, cont 3 % INH, IPV  - Continue volume removal with HD  - hypoxemia w/ turning and repositioning, CXR 11/16 shows moderate right pleural effusion, worsening RUL atelectasis, left has small effusion and/or atelectasis  - Bronchoscopy bedside 11/16 noted with white secretions right > left  - f/u BAL     #tracheomalacia   - CT CHEST (9/8) with tracheomalacia, BL pleural effusions and persistent consolidations     GI  # GIB: last pRBC transfused 11/4 (10/14 before that)   - Continue on PPI BID  - patient had melena before 11/13 but H/H stable,  Eliquis was held for dark brown/black stool  - as per nursing staff, stool is black in the rectal system    # Dysphagia   - NGT- tolerating feeds, at goal- changed to nepro was per nutrition    RENAL  # ESRD, L ARTHUR taylor   - HD MWF TIW with midodrine and droxidopa   - ICU for vasopressor assisted volume removal, cap to 1 pressor and not offering CRRT due to the comorbidities and prognosis   - f/u w/ nephrology: will continue to offer patient 1-pressor assisted HD as agreed prior, as long as she can tolerate HD maxed on 1 pressor, Levo to 0.3; she will be considered iHD candidate  - IR was planning PermCath 11/14; but procedure was cancelled due to pt in MICU, on vasopressors, and has leucocytosis  - [facility] ilene discontinued 11/16 for line holiday, trialysis catheter placed in [facility] on 11/17 however was removed 11/18 iso poor flow rates during HD.  - Per IR, patient is not a candidate for a tunneled HD catheter at this time given uptrending leukocytosis  - Jagrutiley placed 11/22 and viable, HD planned for 11/25    INFECTIOUS DISEASE  #Septic shock  , afebrile,  WBC - 15>27, LA wnls  #blood cx 9/1: MSSA with hypotension  repeat negative 9/4, 9/8 s/p 4 weeks of vanco/dapto through 10/2  #Colonized with  pseudomonas   # MSSA/ESBL E Coli PNA  # L otitis Externa s/p ENT debridement c/b Candida, s/p Meropenem and Fluconazole  # Proteus/ Pseudomonas in sputum, s/p Aztreonam   - recent Bcx 11/2 negative, sputum 11/2 with  pseudomonas- colonized   - repeat MRSA PCR neg  - afebrile now after completing course aztreonam 11/6  - monitoring off abx   - f/u BAL (11/16)- only afb was sent which is negative.   - BCx 11/22 with Gram positive anaerobes in 1 bottle, likely contaminant, will repeat. f/u BCx 11/24    # possible malignant otitis externa   # ESBL ECOLI and MSSA VAP c/b MSSA bacteremia   - hx of MSSA bacteremia, ESBL E. Coli sputum Cx, BAL w/ MSSA  - treated with abx   - eosinophilia workup: JORGE, ANCAs negative    HEME  # Anemia second to GIB vs renal disease   - multiple transfusions, last done 11/4  - Eliquis held 2/2 pt having melena, and now pt with black stools/dark brown  - c/w ASA 81mg  - c/w Heparin SQ     #Allergic transfusion reaction: per RCU documentation, developed erythematous rash across chest shortly after starting transfusion, blood bank consulted and diagnosed mild allergic rxn  - premedicate w/ benadryl and famotidine prior to future transfusions  - no issue w/ pRBC transfusion 11/4    VASCULAR   # LLE POP DVT   - on Eliquis (held as of 11/11 PM for procedure and also patient having melena), stools are black/dark brown  - restarted Heparin SQ prophylaxis 11/14 and will continue to hold off starting Eliquis given melena.  - SCDs    # HD access  - TRISHA TAYLOR (9/27 - 10/21), replaced 10/24 -11/15  - TRISHA taylor discontinued 11/16 for line holiday, trialysis catheter placed in [facility] on 11/17 however was removed 11/18 iso poor flow rates during HD.  - Shiley placed 11/22, viable for HD  -patient deemed not a candidate for a tunneled HD catheter given uptrending leukocytosis    ONC   # Hx of Breast CA   - Patient dx in 2018 and s/p B/L mastectomy (radical on right) and chemo and RT.     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       NEUROLOGY  #AMS  # Multiple embolic strokes   # L PCA Infarct   MR head performed w/ new infarct and old infarcts, EEG without seizure events but with high foci potential   - EEG given facial twitching: negative for epileptiform activity (10/31)  - deferring repeat MRI as unlikely to   - baseline AOX3 at home per daughter, trached/non-verbal   - c/w ASA 81 mg   - continue Lipitor    CARDIOVASCULAR  # Septic vs vasoplegic shock   Etiology likely septic iso active infection. Possible component of vasoplegic, however no longer with active infection or on sedation. Off IV vasopressors.   Current regimen:   - droxidopa 600mg q8h with PRN 600mg ordered preHD  - midodrine 40mg q6 h; trial of additional 40mg prior to HD  - required levophed during HD 11/23,  now weaned off, will need additional midodrine/droxidopa dose preHD      # HFpEF w/ decompensation   > ECHO w/ EF 59 with severe LVH and diastolic dysfunction   - fluid overloaded, HD to remove fluids     HEENT   # Left ear OE with acute on chronic left-sided otomastoiditis, s/p Abx (see in ID)   - noted to have white discharged/residue, increased from prior per patient's daughter; ENT reconsulted, resumed on cipro drops (10/31 - 11/7),   # 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   - resolved    RESPIRATORY  # acute hypoxic resp failure second to volume overload, ventilator dependence   # Copious inline and oral thick tanned secretions   - CKD vs HFpEF w/ hypercarbia 2/2 volume overload requiring intubation, trach, and HD initiation  - Continue on albuterol and chest PT, cont 3 % INH, IPV  - Continue volume removal with HD  - hypoxemia w/ turning and repositioning, CXR 11/16 shows moderate right pleural effusion, worsening RUL atelectasis, left has small effusion and/or atelectasis  - Bronchoscopy bedside 11/16 noted with white secretions right > left  - f/u BAL     #tracheomalacia   - CT CHEST (9/8) with tracheomalacia, BL pleural effusions and persistent consolidations     GI  # GIB: last pRBC transfused 11/4 (10/14 before that)   - Continue on PPI BID  - patient had melena before 11/13 but H/H stable,  Eliquis was held for dark brown/black stool  - as per nursing staff, stool is black in the rectal system    # Dysphagia   - NGT- tolerating feeds, at goal- changed to nepro was per nutrition    RENAL  # ESRD, L ARTHUR taylor   - HD MWF TIW with midodrine and droxidopa   - ICU for vasopressor assisted volume removal, cap to 1 pressor and not offering CRRT due to the comorbidities and prognosis   - f/u w/ nephrology: will continue to offer patient 1-pressor assisted HD as agreed prior, as long as she can tolerate HD maxed on 1 pressor, Levo to 0.3; she will be considered iHD candidate  - IR was planning PermCath 11/14; but procedure was cancelled due to pt in MICU, on vasopressors, and has leucocytosis  - [facility] ilene discontinued 11/16 for line holiday, trialysis catheter placed in [facility] on 11/17 however was removed 11/18 iso poor flow rates during HD.  - Per IR, patient is not a candidate for a tunneled HD catheter at this time given uptrending leukocytosis  - Jagrutiley placed 11/22 and viable, last HD 11/25    INFECTIOUS DISEASE  #Septic shock  , afebrile,  WBC - 15>27, LA wnls  #blood cx 9/1: MSSA with hypotension  repeat negative 9/4, 9/8 s/p 4 weeks of vanco/dapto through 10/2  #Colonized with  pseudomonas   # MSSA/ESBL E Coli PNA  # L otitis Externa s/p ENT debridement c/b Candida, s/p Meropenem and Fluconazole  # Proteus/ Pseudomonas in sputum, s/p Aztreonam   - recent Bcx 11/2 negative, sputum 11/2 with  pseudomonas- colonized   - repeat MRSA PCR neg  - afebrile now after completing course aztreonam 11/6  - monitoring off abx   - f/u BAL (11/16)- only afb was sent which is negative.   - f/u BCx 11/24 NGTD    # possible malignant otitis externa   # ESBL ECOLI and MSSA VAP c/b MSSA bacteremia   - hx of MSSA bacteremia, ESBL E. Coli sputum Cx, BAL w/ MSSA  - treated with abx   - eosinophilia workup: JORGE, ANCAs negative    HEME  # Anemia second to GIB vs renal disease   - multiple transfusions, last done 11/4  - Eliquis held 2/2 pt having melena, and now pt with black stools/dark brown  - c/w ASA 81mg  - c/w Heparin SQ     #Allergic transfusion reaction: per RCU documentation, developed erythematous rash across chest shortly after starting transfusion, blood bank consulted and diagnosed mild allergic rxn  - premedicate w/ benadryl and famotidine prior to future transfusions  - no issue w/ pRBC transfusion 11/4    VASCULAR   # LLE POP DVT   - on Eliquis (held as of 11/11 PM for procedure and also patient having melena), stools are black/dark brown  - restarted Heparin SQ prophylaxis 11/14 and will continue to hold off starting Eliquis given melena.  - SCDs    # HD access  - TRISHA TAYLOR (9/27 - 10/21), replaced 10/24 -11/15  - TRISHA taylor discontinued 11/16 for line holiday, trialysis catheter placed in [facility] on 11/17 however was removed 11/18 iso poor flow rates during HD.  - Shiley placed 11/22, viable for HD  -patient deemed not a candidate for a tunneled HD catheter given uptrending leukocytosis    ONC   # Hx of Breast CA   - Patient dx in 2018 and s/p B/L mastectomy (radical on right) and chemo and RT.     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       NEUROLOGY  #AMS  # Multiple embolic strokes   # L PCA Infarct   MR head performed w/ new infarct and old infarcts, EEG without seizure events but with high foci potential   - EEG given facial twitching: negative for epileptiform activity (10/31)  - deferring repeat MRI as unlikely to   - baseline AOX3 at home per daughter, trached/non-verbal   - c/w ASA 81 mg   - continue Lipitor  - Will wean propofol and start precedex, wean as tolerated    CARDIOVASCULAR  # Septic vs vasoplegic shock   Etiology likely septic iso active infection. Possible component of vasoplegic, however no longer with active infection or on sedation. Off IV vasopressors.   Current regimen:   - droxidopa 600mg q8h with PRN 600mg ordered preHD  - midodrine 40mg q6 h; trial of additional 40mg prior to HD  - required levophed during HD 11/23,  now weaned off, will need additional midodrine/droxidopa dose preHD      # HFpEF w/ decompensation   > ECHO w/ EF 59 with severe LVH and diastolic dysfunction   - fluid overloaded, HD to remove fluids     HEENT   # Left ear OE with acute on chronic left-sided otomastoiditis, s/p Abx (see in ID)   - noted to have white discharged/residue, increased from prior per patient's daughter; ENT reconsulted, resumed on cipro drops (10/31 - 11/7),   # 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   - resolved    RESPIRATORY  # acute hypoxic resp failure second to volume overload, ventilator dependence   # Copious inline and oral thick tanned secretions   - CKD vs HFpEF w/ hypercarbia 2/2 volume overload requiring intubation, trach, and HD initiation  - Continue on albuterol and chest PT, cont 3 % INH, IPV  - Continue volume removal with HD  - hypoxemia w/ turning and repositioning, CXR 11/16 shows moderate right pleural effusion, worsening RUL atelectasis, left has small effusion and/or atelectasis  - Bronchoscopy bedside 11/16 noted with white secretions right > left  - f/u BAL     #tracheomalacia   - CT CHEST (9/8) with tracheomalacia, BL pleural effusions and persistent consolidations     GI  # GIB: last pRBC transfused 11/4 (10/14 before that)   - Continue on PPI BID  - patient had melena before 11/13 but H/H stable,  Eliquis was held for dark brown/black stool  - as per nursing staff, stool is black in the rectal system    # Dysphagia   - NGT- tolerating feeds, at goal- changed to nepro was per nutrition    RENAL  # ESRD, L ARTHUR taylor   - HD MWF TIW with midodrine and droxidopa   - ICU for vasopressor assisted volume removal, cap to 1 pressor and not offering CRRT due to the comorbidities and prognosis   - f/u w/ nephrology: will continue to offer patient 1-pressor assisted HD as agreed prior, as long as she can tolerate HD maxed on 1 pressor, Levo to 0.3; she will be considered iHD candidate  - IR was planning PermCath 11/14; but procedure was cancelled due to pt in MICU, on vasopressors, and has leucocytosis  - [facility] ilene discontinued 11/16 for line holiday, trialysis catheter placed in [facility] on 11/17 however was removed 11/18 iso poor flow rates during HD.  - Per IR, patient is not a candidate for a tunneled HD catheter at this time given uptrending leukocytosis  - Jagrutiley placed 11/22 and viable, last HD 11/25    INFECTIOUS DISEASE  #Septic shock  , afebrile,  WBC - 15>27, LA wnls  #blood cx 9/1: MSSA with hypotension  repeat negative 9/4, 9/8 s/p 4 weeks of vanco/dapto through 10/2  #Colonized with  pseudomonas   # MSSA/ESBL E Coli PNA  # L otitis Externa s/p ENT debridement c/b Candida, s/p Meropenem and Fluconazole  # Proteus/ Pseudomonas in sputum, s/p Aztreonam   - recent Bcx 11/2 negative, sputum 11/2 with  pseudomonas- colonized   - repeat MRSA PCR neg  - afebrile now after completing course aztreonam 11/6  - monitoring off abx   - f/u BAL (11/16)- only afb was sent which is negative.   - f/u BCx 11/24 NGTD    # possible malignant otitis externa   # ESBL ECOLI and MSSA VAP c/b MSSA bacteremia   - hx of MSSA bacteremia, ESBL E. Coli sputum Cx, BAL w/ MSSA  - treated with abx   - eosinophilia workup: JORGE, ANCAs negative    HEME  # Anemia second to GIB vs renal disease   - multiple transfusions, last done 11/4  - Eliquis held 2/2 pt having melena, and now pt with black stools/dark brown  - c/w ASA 81mg  - c/w Heparin SQ     #Allergic transfusion reaction: per RCU documentation, developed erythematous rash across chest shortly after starting transfusion, blood bank consulted and diagnosed mild allergic rxn  - premedicate w/ benadryl and famotidine prior to future transfusions  - no issue w/ pRBC transfusion 11/4    VASCULAR   # LLE POP DVT   - on Eliquis (held as of 11/11 PM for procedure and also patient having melena), stools are black/dark brown  - restarted Heparin SQ prophylaxis 11/14 and will continue to hold off starting Eliquis given melena.  - SCDs    # HD access  - TRISHA TAYLOR (9/27 - 10/21), replaced 10/24 -11/15  - TRISHA taylor discontinued 11/16 for line holiday, trialysis catheter placed in [facility] on 11/17 however was removed 11/18 iso poor flow rates during HD.  - Shiley placed 11/22, viable for HD  -patient deemed not a candidate for a tunneled HD catheter given uptrending leukocytosis    ONC   # Hx of Breast CA   - Patient dx in 2018 and s/p B/L mastectomy (radical on right) and chemo and RT.     ENDOCRINE  # IDDM2   - Continue on NPH 6U with moderate ISS   - Adjust as needed    SKIN  #Rash  - Small vesicular rash to abdomen which is old and improved, will CTM    ETHICS/ GOC    - Attempted palliative discussion however family not interested and wishes for FULL CODE  - Family continues to want everything done despite inability to tolerate HD on multiple oral pressor  - Levo capped at 0.3 during HD

## 2023-11-27 NOTE — CHART NOTE - NSCHARTNOTEFT_GEN_A_CORE
Patient being seen for malnutrition follow up. Spoke with RN and obtained subjective information from extensive chart review.     Nutrition Interval Events: Pt continues on TF without any noted intolerance to ordered TF at this time (no nausea/vomiting or abdominal distention); Flexiseal in place with 125 mL output over the past 24 hrs. Weight trend reflective of fluid shifts with edema presently 3+ generalized. FS over the past 24 hrs 154 - 203 mg/dl with NPH and Admelog insulins ordered for coverage. Pt remains at severe risk for malnutrition based on inadequate energy intake and ongoing moderate fluid accumulation. Suggest continuing with nutrition plan of care as ordered. RDN services to remain available as needed.     CURRENT Diet, NPO with Tube Feed:   Tube Feeding Modality: Nasogastric  Nepro with Carb Steady (NEPRORTH)  Total Volume for 24 Hours (mL): 720  Continuous  Starting Tube Feed Rate {mL per Hour}: 30  Increase Tube Feed Rate by (mL): 0     Every 4 hours  Until Goal Tube Feed Rate (mL per Hour): 30  Tube Feed Duration (in Hours): 24  Tube Feed Start Time: 17:25  Liquid Protein Supplement     Qty per Day:  2 (11-22-23 @ 10:22)    TF provides:  720 mL total volume  1296 kcals (+200 kcals LPS = 1496 kcals/day)  58 gm protein (+30 gm LPS = 88 gm total protein/day)  523 mL free water     Weight:                    Height:   61"                Upper 10% Ideal Body Weight:  115.5lbs / 52.5kg   68kg (11/25)  74.2kg (11/22)  74kg (11/20)  74kg (11/19)  74.6kg (11/17)  67.1kg (11/16)  78.5kg (11/3)  80.4kg (10/31)  78.5kg (10/2)  83.2kg (9/24)  58.0kg (8/31)  66.5kg (8/11 on admit)    __________________ Pertinent Medications__________________   MEDICATIONS  (STANDING):  albuterol/ipratropium for Nebulization 3 milliLiter(s) Nebulizer every 6 hours  artificial tears (preservative free) Ophthalmic Solution 1 Drop(s) Both EYES every 4 hours  aspirin  chewable 81 milliGRAM(s) Enteral Tube daily  atorvastatin 10 milliGRAM(s) Oral at bedtime  calamine/zinc oxide Lotion 1 Application(s) Topical daily  chlorhexidine 0.12% Liquid 15 milliLiter(s) Oral Mucosa every 12 hours  chlorhexidine 2% Cloths 1 Application(s) Topical daily  chlorhexidine 4% Liquid 1 Application(s) Topical <User Schedule>  dexMEDEtomidine Infusion 0.2 MICROgram(s)/kG/Hr (3.33 mL/Hr) IV Continuous <Continuous>  dextrose 5%. 1000 milliLiter(s) (50 mL/Hr) IV Continuous <Continuous>  dextrose 5%. 1000 milliLiter(s) (100 mL/Hr) IV Continuous <Continuous>  dextrose 50% Injectable 12.5 Gram(s) IV Push once  dextrose 50% Injectable 25 Gram(s) IV Push once  dextrose 50% Injectable 25 Gram(s) IV Push once  dextrose 50% Injectable 25 Gram(s) IV Push once  droxidopa 600 milliGRAM(s) Oral every 8 hours  epoetin odalis (EPOGEN) Injectable 37423 Unit(s) IV Push <User Schedule>  ferrous    sulfate Liquid 300 milliGRAM(s) Oral daily  glucagon  Injectable 1 milliGRAM(s) IntraMuscular once  heparin   Injectable 5000 Unit(s) SubCutaneous every 8 hours  insulin lispro (ADMELOG) corrective regimen sliding scale   SubCutaneous every 6 hours  insulin NPH human recombinant 4 Unit(s) SubCutaneous every 6 hours  midodrine. 40 milliGRAM(s) Oral <User Schedule>  norepinephrine Infusion 0.05 MICROgram(s)/kG/Min (6.23 mL/Hr) IV Continuous <Continuous>  pantoprazole  Injectable 40 milliGRAM(s) IV Push two times a day  petrolatum Ophthalmic Ointment 1 Application(s) Both EYES four times a day  sodium chloride 1 Gram(s) Oral two times a day  sodium chloride 3%  Inhalation 4 milliLiter(s) Inhalation every 6 hours    __________________ Pertinent Labs__________________   11-27 Na 133 mmol/L<L> Glu 174 mg/dL<H> K+ 4.6 mmol/L Cr 1.79 mg/dL<H> BUN 38 mg/dL<H> Phos 4.0 mg/dL  11-27 @ 11:21 POCT 160 mg/dL  11-27 @ 05:23 POCT 160 mg/dL  11-26 @ 23:30 POCT 186 mg/dL  11-26 @ 17:28 POCT 216 mg/dL      Skin: L inner ear wound, L lower calf wound      _____Estimated Energy Needs (based upper 10% Ideal Body Weight)_____  25-30 kcals/kg = 6833-3328 kcals/d  1.5-1.8.g protein/kg = 80-95g protein/d      Nutrition Diagnosis: Severe malnutrition  [x] ongoing    Goal(s):  1. Patient to meet > 75% estimated energy needs    Recommendations:   1. Continue with nutrition plan of care as ordered.    Monitoring and Evaluation:   1. Monitor weights, labs, BMs, skin integrity, enteral tolerance and edema.  2. RD services to remain available.     Education:  [x] Not warranted at present    Nelia Kohli, MS, RDN, CDN, CNSC on [facility] TEAMS or Pager #[phone].

## 2023-11-27 NOTE — PROGRESS NOTE ADULT - ATTENDING COMMENTS
72 yo F PMH T2DM, HTN, CKD now dialysis dependent, HFpEF, breast ca s/p bilateral mastectomy, chemotherapy, and radiation (2018), hx of cardiac arrest (2018), L pca occipital cva here with prolonged hospitalization c/b chronic respiratory failure, renal failure, MDR infections, and AMS. She is now in persistent shock requiring oral vasopressors with minimal improvement in mental status.    intermittently hypoxic and agitated requiring sedation    # acute hypoxemic respiratory failure  # shock  # ESRD on HD  # pseudomonas pneumonia  - given rising wbc and worsening hypoxemia, may need to start abx, f/u repeat cultures  - c/w vasopressors for shock  - hsq for dvt ppx  - HD as per renal    d/w family at bedside

## 2023-11-27 NOTE — PROGRESS NOTE ADULT - SUBJECTIVE AND OBJECTIVE BOX
INTERVAL HPI/OVERNIGHT EVENTS: No acute overnight events.     SUBJECTIVE: Patient seen and examined at bedside. Mentation unchanged.    ROS: All negative except as listed above.    VITAL SIGNS:  ICU Vital Signs Last 24 Hrs  T(C): 36.7 (27 Nov 2023 08:00), Max: 37 (26 Nov 2023 17:00)  T(F): 98 (27 Nov 2023 08:00), Max: 98.6 (26 Nov 2023 17:00)  HR: 109 (27 Nov 2023 11:02) (79 - 111)  BP: --  BP(mean): --  ABP: 111/38 (27 Nov 2023 09:00) (101/36 - 134/48)  ABP(mean): 64 (27 Nov 2023 09:00) (60 - 84)  RR: 22 (27 Nov 2023 09:00) (16 - 24)  SpO2: 95% (27 Nov 2023 11:02) (95% - 100%)    O2 Parameters below as of 27 Nov 2023 10:29  Patient On (Oxygen Delivery Method): ventilator          Mode: AC/ CMV (Assist Control/ Continuous Mandatory Ventilation), RR (machine): 24, FiO2: 40, PEEP: 8, ITime: 0.8, MAP: 19, PC: 35, PIP: 43  Plateau pressure:   P/F ratio:     11-26 @ 07:01 - 11-27 @ 07:00  --------------------------------------------------------  IN: 1083.1 mL / OUT: 125 mL / NET: 958.1 mL    11-27 @ 07:01 - 11-27 @ 12:21  --------------------------------------------------------  IN: 143.1 mL / OUT: 0 mL / NET: 143.1 mL      CAPILLARY BLOOD GLUCOSE      POCT Blood Glucose.: 160 mg/dL (27 Nov 2023 11:21)      ECG: reviewed.    PHYSICAL EXAM:    CONSTITUTIONAL: Trached, critically ill    RESPIRATORY: +vent and coarse breath sounds auscultated  CARDIOVASCULAR: Tachycardic and regular rhythm, normal S1 and S2, no murmur/rub/gallop;  Peripheral pulses are 2+ bilaterally  ABDOMEN: +NGT. distended but soft. Nontender to palpation, normoactive bowel sounds, no rebound/guarding; No hepatosplenomegaly. Rectal tube w/ black output  MSK: no clubbing or cyanosis of digits; no joint swelling or tenderness to palpation  Extremities: Anasarca. 3+ BLE edema to knees, 1+ edema of bilateral thighs. 3+ edema of bilateral forearms  Neuro: Does not respond to verbal or physical stimuli. Does not follow commands    MEDICATIONS:  MEDICATIONS  (STANDING):  albuterol/ipratropium for Nebulization 3 milliLiter(s) Nebulizer every 6 hours  artificial tears (preservative free) Ophthalmic Solution 1 Drop(s) Both EYES every 4 hours  aspirin  chewable 81 milliGRAM(s) Enteral Tube daily  atorvastatin 10 milliGRAM(s) Oral at bedtime  calamine/zinc oxide Lotion 1 Application(s) Topical daily  chlorhexidine 0.12% Liquid 15 milliLiter(s) Oral Mucosa every 12 hours  chlorhexidine 2% Cloths 1 Application(s) Topical daily  chlorhexidine 4% Liquid 1 Application(s) Topical <User Schedule>  dextrose 5%. 1000 milliLiter(s) (50 mL/Hr) IV Continuous <Continuous>  dextrose 5%. 1000 milliLiter(s) (100 mL/Hr) IV Continuous <Continuous>  dextrose 50% Injectable 12.5 Gram(s) IV Push once  dextrose 50% Injectable 25 Gram(s) IV Push once  dextrose 50% Injectable 25 Gram(s) IV Push once  dextrose 50% Injectable 25 Gram(s) IV Push once  droxidopa 600 milliGRAM(s) Oral every 8 hours  epoetin odalis (EPOGEN) Injectable 72656 Unit(s) IV Push <User Schedule>  ferrous    sulfate Liquid 300 milliGRAM(s) Oral daily  glucagon  Injectable 1 milliGRAM(s) IntraMuscular once  heparin   Injectable 5000 Unit(s) SubCutaneous every 8 hours  insulin lispro (ADMELOG) corrective regimen sliding scale   SubCutaneous every 6 hours  insulin NPH human recombinant 4 Unit(s) SubCutaneous every 6 hours  midodrine. 40 milliGRAM(s) Oral <User Schedule>  norepinephrine Infusion 0.05 MICROgram(s)/kG/Min (6.23 mL/Hr) IV Continuous <Continuous>  pantoprazole  Injectable 40 milliGRAM(s) IV Push two times a day  petrolatum Ophthalmic Ointment 1 Application(s) Both EYES four times a day  propofol Infusion 20 MICROgram(s)/kG/Min (7.98 mL/Hr) IV Continuous <Continuous>  sodium chloride 1 Gram(s) Oral two times a day  sodium chloride 3%  Inhalation 4 milliLiter(s) Inhalation every 6 hours    MEDICATIONS  (PRN):  acetaminophen   Oral Liquid .. 650 milliGRAM(s) Oral every 6 hours PRN Temp greater or equal to 38C (100.4F), Mild Pain (1 - 3)  dextrose Oral Gel 15 Gram(s) Oral once PRN Blood Glucose LESS THAN 70 milliGRAM(s)/deciliter  diphenhydrAMINE Elixir 50 milliGRAM(s) Oral once PRN Rash and/or Itching  droxidopa 600 milliGRAM(s) Oral <User Schedule> PRN prior to hemodialysis  midodrine. 40 milliGRAM(s) Oral once PRN 1 Hour Pre HD  sodium chloride 0.9% Bolus. 250 milliLiter(s) IV Bolus every 5 minutes PRN SBP LESS THAN or EQUAL to 90 mmHg  sodium chloride 0.9% lock flush 10 milliLiter(s) IV Push every 1 hour PRN Pre/post blood products, medications, blood draw, and to maintain line patency      ALLERGIES:  Allergies    isoniazid (Rash)  nafcillin (Unknown)  hydrALAZINE (Rash)  vitamin E (Short breath; Urticaria; Hives)  doxycycline (Rash)  cefepime (Rash)  NIFEdipine (Urticaria; Hives)  Zosyn (Rash)    Intolerances        LABS:                        9.0    25.53 )-----------( 551      ( 27 Nov 2023 01:20 )             31.0     11-27    133<L>  |  99  |  38<H>  ----------------------------<  174<H>  4.6   |  24  |  1.79<H>    Ca    9.0      27 Nov 2023 01:20  Phos  4.0     11-27  Mg     2.30     11-27    TPro  6.4  /  Alb  1.7<L>  /  TBili  <0.2  /  DBili  x   /  AST  25  /  ALT  17  /  AlkPhos  387<H>  11-27      Urinalysis Basic - ( 27 Nov 2023 01:20 )    Color: x / Appearance: x / SG: x / pH: x  Gluc: 174 mg/dL / Ketone: x  / Bili: x / Urobili: x   Blood: x / Protein: x / Nitrite: x   Leuk Esterase: x / RBC: x / WBC x   Sq Epi: x / Non Sq Epi: x / Bacteria: x      ABG:      vBG:    Micro:    Culture - Blood (collected 11-24-23 @ 18:30)  Source: .Blood Blood-Venous  Preliminary Report (11-26-23 @ 22:01):    No growth at 48 Hours    Culture - Blood (collected 11-22-23 @ 09:30)  Source: .Blood Blood-Peripheral  Preliminary Report (11-26-23 @ 17:00):    No growth at 4 days    Culture - Blood (collected 11-22-23 @ 09:00)  Source: .Blood Blood-Venous  Gram Stain (11-23-23 @ 22:30):    Growth in anaerobic bottle: Gram positive cocci in pairs  Final Report (11-24-23 @ 20:04):    Growth in anaerobic bottle: Staphylococcus epidermidis    Coagulase Negative Staphylococci isolated from a single blood culture set    may represent contamination.    Contact the Microbiology Department at [phone] if susceptibility    testing is clinically indicated.    Direct identification is available within approximately 3-5    hours either by Blood Panel Multiplexed PCR or Direct    MALDI-TOF. Details: [email or web address]  Organism: Blood Culture PCR (11-24-23 @ 20:04)  Organism: Blood Culture PCR (11-24-23 @ 20:04)      Method Type: PCR      -  Staphylococcus epidermidis, Methicillin resistant: Detec    Culture - Blood (collected 11-02-23 @ 09:10)  Source: .Blood Blood-Peripheral  Final Report (11-07-23 @ 12:01):    No growth at 5 days    Culture - Blood (collected 11-02-23 @ 09:00)  Source: .Blood Blood-Peripheral  Final Report (11-07-23 @ 12:01):    No growth at 5 days        Culture - Sputum (collected 11-02-23 @ 10:24)  Source: ET Tube ET Tube  Gram Stain (11-02-23 @ 15:31):    Moderate polymorphonuclear leukocytes per low power field    No Squamous epithelial cells per low power field    Moderate Gram Negative Rods per oil power field  Final Report (11-04-23 @ 22:56):    Numerous Pseudomonas aeruginosa (Carbapenem Resistant)  Organism: Pseudomonas aeruginosa (Carbapenem Resistant) (11-04-23 @ 22:56)  Organism: Pseudomonas aeruginosa (Carbapenem Resistant) (11-04-23 @ 22:56)      Method Type: CarbaR      -  Resistance Gene to Carbapenem: Nondet  Organism: Pseudomonas aeruginosa (Carbapenem Resistant) (11-04-23 @ 22:56)      Method Type: SIMON      -  Amikacin: S <=16      -  Aztreonam: R >16      -  Cefepime: I 16      -  Ceftazidime: I 16      -  Ceftazidime/Avibactam: S <=4      -  Ceftolozane/tazobactam: S <=2      -  Ciprofloxacin: R >2      -  Gentamicin: R >8      -  Imipenem: R >8      -  Levofloxacin: R >4      -  Meropenem: R >8      -  Piperacillin/Tazobactam: R >64      -  Tobramycin: R >8        RADIOLOGY & ADDITIONAL TESTS: Reviewed.   INTERVAL HPI/OVERNIGHT EVENTS: Started on propofol overnight for tachypnea and tachycardia otherwise no acute overnight events.     SUBJECTIVE: Patient seen and examined at bedside. Mentation unchanged.    ROS: All negative except as listed above.    VITAL SIGNS:  ICU Vital Signs Last 24 Hrs  T(C): 36.7 (27 Nov 2023 08:00), Max: 37 (26 Nov 2023 17:00)  T(F): 98 (27 Nov 2023 08:00), Max: 98.6 (26 Nov 2023 17:00)  HR: 109 (27 Nov 2023 11:02) (79 - 111)  BP: --  BP(mean): --  ABP: 111/38 (27 Nov 2023 09:00) (101/36 - 134/48)  ABP(mean): 64 (27 Nov 2023 09:00) (60 - 84)  RR: 22 (27 Nov 2023 09:00) (16 - 24)  SpO2: 95% (27 Nov 2023 11:02) (95% - 100%)    O2 Parameters below as of 27 Nov 2023 10:29  Patient On (Oxygen Delivery Method): ventilator          Mode: AC/ CMV (Assist Control/ Continuous Mandatory Ventilation), RR (machine): 24, FiO2: 40, PEEP: 8, ITime: 0.8, MAP: 19, PC: 35, PIP: 43  Plateau pressure:   P/F ratio:     11-26 @ 07:01  -  11-27 @ 07:00  --------------------------------------------------------  IN: 1083.1 mL / OUT: 125 mL / NET: 958.1 mL    11-27 @ 07:01  -  11-27 @ 12:21  --------------------------------------------------------  IN: 143.1 mL / OUT: 0 mL / NET: 143.1 mL      CAPILLARY BLOOD GLUCOSE      POCT Blood Glucose.: 160 mg/dL (27 Nov 2023 11:21)      ECG: reviewed.    PHYSICAL EXAM:    CONSTITUTIONAL: Trached, critically ill    RESPIRATORY: +vent and coarse breath sounds auscultated  CARDIOVASCULAR: Tachycardic and regular rhythm, normal S1 and S2, no murmur/rub/gallop;  Peripheral pulses are 2+ bilaterally  ABDOMEN: +NGT. distended but soft. Nontender to palpation, normoactive bowel sounds, no rebound/guarding; No hepatosplenomegaly. Rectal tube w/ black output  MSK: no clubbing or cyanosis of digits; no joint swelling or tenderness to palpation  Extremities: Anasarca. 3+ BLE edema to knees, 1+ edema of bilateral thighs. 3+ edema of bilateral forearms  Neuro: Does not respond to verbal or physical stimuli. Does not follow commands    MEDICATIONS:  MEDICATIONS  (STANDING):  albuterol/ipratropium for Nebulization 3 milliLiter(s) Nebulizer every 6 hours  artificial tears (preservative free) Ophthalmic Solution 1 Drop(s) Both EYES every 4 hours  aspirin  chewable 81 milliGRAM(s) Enteral Tube daily  atorvastatin 10 milliGRAM(s) Oral at bedtime  calamine/zinc oxide Lotion 1 Application(s) Topical daily  chlorhexidine 0.12% Liquid 15 milliLiter(s) Oral Mucosa every 12 hours  chlorhexidine 2% Cloths 1 Application(s) Topical daily  chlorhexidine 4% Liquid 1 Application(s) Topical <User Schedule>  dextrose 5%. 1000 milliLiter(s) (50 mL/Hr) IV Continuous <Continuous>  dextrose 5%. 1000 milliLiter(s) (100 mL/Hr) IV Continuous <Continuous>  dextrose 50% Injectable 12.5 Gram(s) IV Push once  dextrose 50% Injectable 25 Gram(s) IV Push once  dextrose 50% Injectable 25 Gram(s) IV Push once  dextrose 50% Injectable 25 Gram(s) IV Push once  droxidopa 600 milliGRAM(s) Oral every 8 hours  epoetin odalis (EPOGEN) Injectable 25857 Unit(s) IV Push <User Schedule>  ferrous    sulfate Liquid 300 milliGRAM(s) Oral daily  glucagon  Injectable 1 milliGRAM(s) IntraMuscular once  heparin   Injectable 5000 Unit(s) SubCutaneous every 8 hours  insulin lispro (ADMELOG) corrective regimen sliding scale   SubCutaneous every 6 hours  insulin NPH human recombinant 4 Unit(s) SubCutaneous every 6 hours  midodrine. 40 milliGRAM(s) Oral <User Schedule>  norepinephrine Infusion 0.05 MICROgram(s)/kG/Min (6.23 mL/Hr) IV Continuous <Continuous>  pantoprazole  Injectable 40 milliGRAM(s) IV Push two times a day  petrolatum Ophthalmic Ointment 1 Application(s) Both EYES four times a day  propofol Infusion 20 MICROgram(s)/kG/Min (7.98 mL/Hr) IV Continuous <Continuous>  sodium chloride 1 Gram(s) Oral two times a day  sodium chloride 3%  Inhalation 4 milliLiter(s) Inhalation every 6 hours    MEDICATIONS  (PRN):  acetaminophen   Oral Liquid .. 650 milliGRAM(s) Oral every 6 hours PRN Temp greater or equal to 38C (100.4F), Mild Pain (1 - 3)  dextrose Oral Gel 15 Gram(s) Oral once PRN Blood Glucose LESS THAN 70 milliGRAM(s)/deciliter  diphenhydrAMINE Elixir 50 milliGRAM(s) Oral once PRN Rash and/or Itching  droxidopa 600 milliGRAM(s) Oral <User Schedule> PRN prior to hemodialysis  midodrine. 40 milliGRAM(s) Oral once PRN 1 Hour Pre HD  sodium chloride 0.9% Bolus. 250 milliLiter(s) IV Bolus every 5 minutes PRN SBP LESS THAN or EQUAL to 90 mmHg  sodium chloride 0.9% lock flush 10 milliLiter(s) IV Push every 1 hour PRN Pre/post blood products, medications, blood draw, and to maintain line patency      ALLERGIES:  Allergies    isoniazid (Rash)  nafcillin (Unknown)  hydrALAZINE (Rash)  vitamin E (Short breath; Urticaria; Hives)  doxycycline (Rash)  cefepime (Rash)  NIFEdipine (Urticaria; Hives)  Zosyn (Rash)    Intolerances        LABS:                        9.0    25.53 )-----------( 551      ( 27 Nov 2023 01:20 )             31.0     11-27    133<L>  |  99  |  38<H>  ----------------------------<  174<H>  4.6   |  24  |  1.79<H>    Ca    9.0      27 Nov 2023 01:20  Phos  4.0     11-27  Mg     2.30     11-27    TPro  6.4  /  Alb  1.7<L>  /  TBili  <0.2  /  DBili  x   /  AST  25  /  ALT  17  /  AlkPhos  387<H>  11-27      Urinalysis Basic - ( 27 Nov 2023 01:20 )    Color: x / Appearance: x / SG: x / pH: x  Gluc: 174 mg/dL / Ketone: x  / Bili: x / Urobili: x   Blood: x / Protein: x / Nitrite: x   Leuk Esterase: x / RBC: x / WBC x   Sq Epi: x / Non Sq Epi: x / Bacteria: x      ABG:      vBG:    Micro:    Culture - Blood (collected 11-24-23 @ 18:30)  Source: .Blood Blood-Venous  Preliminary Report (11-26-23 @ 22:01):    No growth at 48 Hours    Culture - Blood (collected 11-22-23 @ 09:30)  Source: .Blood Blood-Peripheral  Preliminary Report (11-26-23 @ 17:00):    No growth at 4 days    Culture - Blood (collected 11-22-23 @ 09:00)  Source: .Blood Blood-Venous  Gram Stain (11-23-23 @ 22:30):    Growth in anaerobic bottle: Gram positive cocci in pairs  Final Report (11-24-23 @ 20:04):    Growth in anaerobic bottle: Staphylococcus epidermidis    Coagulase Negative Staphylococci isolated from a single blood culture set    may represent contamination.    Contact the Microbiology Department at [phone] if susceptibility    testing is clinically indicated.    Direct identification is available within approximately 3-5    hours either by Blood Panel Multiplexed PCR or Direct    MALDI-TOF. Details: [email or web address]  Organism: Blood Culture PCR (11-24-23 @ 20:04)  Organism: Blood Culture PCR (11-24-23 @ 20:04)      Method Type: PCR      -  Staphylococcus epidermidis, Methicillin resistant: Detec    Culture - Blood (collected 11-02-23 @ 09:10)  Source: .Blood Blood-Peripheral  Final Report (11-07-23 @ 12:01):    No growth at 5 days    Culture - Blood (collected 11-02-23 @ 09:00)  Source: .Blood Blood-Peripheral  Final Report (11-07-23 @ 12:01):    No growth at 5 days        Culture - Sputum (collected 11-02-23 @ 10:24)  Source: ET Tube ET Tube  Gram Stain (11-02-23 @ 15:31):    Moderate polymorphonuclear leukocytes per low power field    No Squamous epithelial cells per low power field    Moderate Gram Negative Rods per oil power field  Final Report (11-04-23 @ 22:56):    Numerous Pseudomonas aeruginosa (Carbapenem Resistant)  Organism: Pseudomonas aeruginosa (Carbapenem Resistant) (11-04-23 @ 22:56)  Organism: Pseudomonas aeruginosa (Carbapenem Resistant) (11-04-23 @ 22:56)      Method Type: CarbaR      -  Resistance Gene to Carbapenem: Nondet  Organism: Pseudomonas aeruginosa (Carbapenem Resistant) (11-04-23 @ 22:56)      Method Type: SIMON      -  Amikacin: S <=16      -  Aztreonam: R >16      -  Cefepime: I 16      -  Ceftazidime: I 16      -  Ceftazidime/Avibactam: S <=4      -  Ceftolozane/tazobactam: S <=2      -  Ciprofloxacin: R >2      -  Gentamicin: R >8      -  Imipenem: R >8      -  Levofloxacin: R >4      -  Meropenem: R >8      -  Piperacillin/Tazobactam: R >64      -  Tobramycin: R >8        RADIOLOGY & ADDITIONAL TESTS: Reviewed. no

## 2023-11-27 NOTE — PROGRESS NOTE ADULT - SUBJECTIVE AND OBJECTIVE BOX
[facility] [phone]        Patient seen and examined she had HD Saturday, 2.4L removed. Levophed intermittently infusing today, currently on hold.         MEDICATIONS  (STANDING):  albuterol/ipratropium for Nebulization 3 milliLiter(s) Nebulizer every 6 hours  artificial tears (preservative free) Ophthalmic Solution 1 Drop(s) Both EYES every 4 hours  aspirin  chewable 81 milliGRAM(s) Enteral Tube daily  atorvastatin 10 milliGRAM(s) Oral at bedtime  calamine/zinc oxide Lotion 1 Application(s) Topical daily  chlorhexidine 0.12% Liquid 15 milliLiter(s) Oral Mucosa every 12 hours  chlorhexidine 2% Cloths 1 Application(s) Topical daily  chlorhexidine 4% Liquid 1 Application(s) Topical <User Schedule>  dexMEDEtomidine Infusion 0.2 MICROgram(s)/kG/Hr (3.33 mL/Hr) IV Continuous <Continuous>  dextrose 5%. 1000 milliLiter(s) (50 mL/Hr) IV Continuous <Continuous>  dextrose 5%. 1000 milliLiter(s) (100 mL/Hr) IV Continuous <Continuous>  dextrose 50% Injectable 25 Gram(s) IV Push once  dextrose 50% Injectable 25 Gram(s) IV Push once  dextrose 50% Injectable 12.5 Gram(s) IV Push once  dextrose 50% Injectable 25 Gram(s) IV Push once  droxidopa 600 milliGRAM(s) Oral every 8 hours  epoetin odalis (EPOGEN) Injectable 82516 Unit(s) IV Push <User Schedule>  ferrous    sulfate Liquid 300 milliGRAM(s) Oral daily  glucagon  Injectable 1 milliGRAM(s) IntraMuscular once  heparin   Injectable 5000 Unit(s) SubCutaneous every 8 hours  insulin lispro (ADMELOG) corrective regimen sliding scale   SubCutaneous every 6 hours  insulin NPH human recombinant 4 Unit(s) SubCutaneous every 6 hours  midodrine. 40 milliGRAM(s) Oral <User Schedule>  norepinephrine Infusion 0.05 MICROgram(s)/kG/Min (6.23 mL/Hr) IV Continuous <Continuous>  pantoprazole  Injectable 40 milliGRAM(s) IV Push two times a day  petrolatum Ophthalmic Ointment 1 Application(s) Both EYES four times a day  sodium chloride 1 Gram(s) Oral two times a day  sodium chloride 3%  Inhalation 4 milliLiter(s) Inhalation every 6 hours      VITAL:  T(C): , Max: 37 (11-26-23 @ 17:00)  T(F): , Max: 98.6 (11-26-23 @ 17:00)  HR: 109 (11-27-23 @ 11:02)  BP: --  BP(mean): --  RR: 22 (11-27-23 @ 09:00)  SpO2: 95% (11-27-23 @ 11:02)  Wt(kg): --    I and O's:    11-26 @ 07:01  -  11-27 @ 07:00  --------------------------------------------------------  IN: 1083.1 mL / OUT: 125 mL / NET: 958.1 mL    11-27 @ 07:01  -  11-27 @ 15:15  --------------------------------------------------------  IN: 143.1 mL / OUT: 0 mL / NET: 143.1 mL          PHYSICAL EXAM:  Constitutional: critically ill, trach to vent   HEENT: + NGT, + tracheostomy   Respiratory: coarse BS  Cardiovascular: tachy s1s2  Gastrointestinal: BS+, soft, NT/ND  Extremities: ++ generalized edema  : No Wheeler  Skin: No rashes  Access: [facility]       LABS:                        9.0    25.53 )-----------( 551      ( 27 Nov 2023 01:20 )             31.0     11-27    133<L>  |  99  |  38<H>  ----------------------------<  174<H>  4.6   |  24  |  1.79<H>    Ca    9.0      27 Nov 2023 01:20  Phos  4.0     11-27  Mg     2.30     11-27    TPro  6.4  /  Alb  1.7<L>  /  TBili  <0.2  /  DBili  x   /  AST  25  /  ALT  17  /  AlkPhos  387<H>  11-27          Urine Studies:  Urinalysis Basic - ( 27 Nov 2023 01:20 )    Color: x / Appearance: x / SG: x / pH: x  Gluc: 174 mg/dL / Ketone: x  / Bili: x / Urobili: x   Blood: x / Protein: x / Nitrite: x   Leuk Esterase: x / RBC: x / WBC x   Sq Epi: x / Non Sq Epi: x / Bacteria: x          Assessment and Plan:   · Assessment	    IMPRESSION: 73F w/ HTN, DM2, CVA, breast CA-bilateral mastectomy, recurrent aspiration pneumonia/respiratory failure, and CKD, 8/11/23 p/w acute hypercapnic respiratory failure; c/b RUSS    (1)Renal - RUSS on CKD4 ==>newly ESRD-HD as of this admission. Last dialyzed Saturday     (2)CV- tenuous hemodynamics - intermittently requiring pressor gtt/on q6h Midodrine     (3)Pulm- trach/vent-dependent    (4)Anemia- on Epogen with HD    (5)GOC - s/p repeated discussions with family regarding GOC - family persisting with interest in aggressive measures.     RECOMMEND:  (1)HD tomorrow: 3hrs, 2.4 kg UF  (2)Epo with HD    Tere Arauz NP  [facility]  [phone]     [facility] [phone]        Patient seen and examined she had HD Saturday, 2.4L removed. Levophed intermittently infusing today, currently on hold.         MEDICATIONS  (STANDING):  albuterol/ipratropium for Nebulization 3 milliLiter(s) Nebulizer every 6 hours  artificial tears (preservative free) Ophthalmic Solution 1 Drop(s) Both EYES every 4 hours  aspirin  chewable 81 milliGRAM(s) Enteral Tube daily  atorvastatin 10 milliGRAM(s) Oral at bedtime  calamine/zinc oxide Lotion 1 Application(s) Topical daily  chlorhexidine 0.12% Liquid 15 milliLiter(s) Oral Mucosa every 12 hours  chlorhexidine 2% Cloths 1 Application(s) Topical daily  chlorhexidine 4% Liquid 1 Application(s) Topical <User Schedule>  dexMEDEtomidine Infusion 0.2 MICROgram(s)/kG/Hr (3.33 mL/Hr) IV Continuous <Continuous>  dextrose 5%. 1000 milliLiter(s) (50 mL/Hr) IV Continuous <Continuous>  dextrose 5%. 1000 milliLiter(s) (100 mL/Hr) IV Continuous <Continuous>  dextrose 50% Injectable 25 Gram(s) IV Push once  dextrose 50% Injectable 25 Gram(s) IV Push once  dextrose 50% Injectable 12.5 Gram(s) IV Push once  dextrose 50% Injectable 25 Gram(s) IV Push once  droxidopa 600 milliGRAM(s) Oral every 8 hours  epoetin odalis (EPOGEN) Injectable 69538 Unit(s) IV Push <User Schedule>  ferrous    sulfate Liquid 300 milliGRAM(s) Oral daily  glucagon  Injectable 1 milliGRAM(s) IntraMuscular once  heparin   Injectable 5000 Unit(s) SubCutaneous every 8 hours  insulin lispro (ADMELOG) corrective regimen sliding scale   SubCutaneous every 6 hours  insulin NPH human recombinant 4 Unit(s) SubCutaneous every 6 hours  midodrine. 40 milliGRAM(s) Oral <User Schedule>  norepinephrine Infusion 0.05 MICROgram(s)/kG/Min (6.23 mL/Hr) IV Continuous <Continuous>  pantoprazole  Injectable 40 milliGRAM(s) IV Push two times a day  petrolatum Ophthalmic Ointment 1 Application(s) Both EYES four times a day  sodium chloride 1 Gram(s) Oral two times a day  sodium chloride 3%  Inhalation 4 milliLiter(s) Inhalation every 6 hours      VITAL:  T(C): , Max: 37 (11-26-23 @ 17:00)  T(F): , Max: 98.6 (11-26-23 @ 17:00)  HR: 109 (11-27-23 @ 11:02)  BP: --  BP(mean): --  RR: 22 (11-27-23 @ 09:00)  SpO2: 95% (11-27-23 @ 11:02)  Wt(kg): --    I and O's:    11-26 @ 07:01  -  11-27 @ 07:00  --------------------------------------------------------  IN: 1083.1 mL / OUT: 125 mL / NET: 958.1 mL    11-27 @ 07:01  -  11-27 @ 15:15  --------------------------------------------------------  IN: 143.1 mL / OUT: 0 mL / NET: 143.1 mL          PHYSICAL EXAM:  Constitutional: critically ill, trach to vent   HEENT: + NGT, + tracheostomy   Respiratory: coarse BS  Cardiovascular: tachy s1s2  Gastrointestinal: BS+, soft, NT/ND  Extremities: ++ generalized edema  : No Wheeler  Skin: No rashes  Access: [facility]       LABS:                        9.0    25.53 )-----------( 551      ( 27 Nov 2023 01:20 )             31.0     11-27    133<L>  |  99  |  38<H>  ----------------------------<  174<H>  4.6   |  24  |  1.79<H>    Ca    9.0      27 Nov 2023 01:20  Phos  4.0     11-27  Mg     2.30     11-27    TPro  6.4  /  Alb  1.7<L>  /  TBili  <0.2  /  DBili  x   /  AST  25  /  ALT  17  /  AlkPhos  387<H>  11-27          Urine Studies:  Urinalysis Basic - ( 27 Nov 2023 01:20 )    Color: x / Appearance: x / SG: x / pH: x  Gluc: 174 mg/dL / Ketone: x  / Bili: x / Urobili: x   Blood: x / Protein: x / Nitrite: x   Leuk Esterase: x / RBC: x / WBC x   Sq Epi: x / Non Sq Epi: x / Bacteria: x          Assessment and Plan:   · Assessment	    IMPRESSION: 73F w/ HTN, DM2, CVA, breast CA-bilateral mastectomy, recurrent aspiration pneumonia/respiratory failure, and CKD, 8/11/23 p/w acute hypercapnic respiratory failure; c/b RUSS    (1)Renal - RUSS on CKD4 ==>newly ESRD-HD as of this admission. Last dialyzed Saturday     (2)CV- tenuous hemodynamics - intermittently requiring pressor gtt/on q6h Midodrine     (3)Pulm- trach/vent-dependent    (4)Anemia- on Epogen with HD    (5)GOC - s/p repeated discussions with family regarding GOC - family persisting with interest in aggressive measures.     RECOMMEND:  (1)HD tomorrow: 3hrs, 2.4 kg UF  (2)Epo with HD    Tere Arauz NP  [facility]  [phone]      RENAL ATTENDING NOTE  Patient seen and examined with NP. Agree with assessment and plan as above.    Jimmy Colon MD  [facility]  [phone]

## 2023-11-27 NOTE — PROGRESS NOTE ADULT - SUBJECTIVE AND OBJECTIVE BOX
Follow Up:      Inverval History/ROS:Patient is a 73y old  Female who presents with a chief complaint of Respiratory distress (27 Nov 2023 06:58)    No Fever  Trach   On vent  Had HD yesterday    Allergies    isoniazid (Rash)  nafcillin (Unknown)  hydrALAZINE (Rash)  vitamin E (Short breath; Urticaria; Hives)  doxycycline (Rash)  cefepime (Rash)  NIFEdipine (Urticaria; Hives)  Zosyn (Rash)    Intolerances        ANTIMICROBIALS:      OTHER MEDS:  acetaminophen   Oral Liquid .. 650 milliGRAM(s) Oral every 6 hours PRN  albuterol/ipratropium for Nebulization 3 milliLiter(s) Nebulizer every 6 hours  artificial tears (preservative free) Ophthalmic Solution 1 Drop(s) Both EYES every 4 hours  aspirin  chewable 81 milliGRAM(s) Enteral Tube daily  atorvastatin 10 milliGRAM(s) Oral at bedtime  calamine/zinc oxide Lotion 1 Application(s) Topical daily  chlorhexidine 0.12% Liquid 15 milliLiter(s) Oral Mucosa every 12 hours  chlorhexidine 2% Cloths 1 Application(s) Topical daily  chlorhexidine 4% Liquid 1 Application(s) Topical <User Schedule>  dextrose 5%. 1000 milliLiter(s) IV Continuous <Continuous>  dextrose 5%. 1000 milliLiter(s) IV Continuous <Continuous>  dextrose 50% Injectable 12.5 Gram(s) IV Push once  dextrose 50% Injectable 25 Gram(s) IV Push once  dextrose 50% Injectable 25 Gram(s) IV Push once  dextrose 50% Injectable 25 Gram(s) IV Push once  dextrose Oral Gel 15 Gram(s) Oral once PRN  diphenhydrAMINE Elixir 50 milliGRAM(s) Oral once PRN  droxidopa 600 milliGRAM(s) Oral every 8 hours  droxidopa 600 milliGRAM(s) Oral <User Schedule> PRN  epoetin odalis (EPOGEN) Injectable 47856 Unit(s) IV Push <User Schedule>  ferrous    sulfate Liquid 300 milliGRAM(s) Oral daily  glucagon  Injectable 1 milliGRAM(s) IntraMuscular once  heparin   Injectable 5000 Unit(s) SubCutaneous every 8 hours  insulin lispro (ADMELOG) corrective regimen sliding scale   SubCutaneous every 6 hours  insulin NPH human recombinant 4 Unit(s) SubCutaneous every 6 hours  midodrine. 40 milliGRAM(s) Oral <User Schedule>  midodrine. 40 milliGRAM(s) Oral once PRN  norepinephrine Infusion 0.05 MICROgram(s)/kG/Min IV Continuous <Continuous>  pantoprazole  Injectable 40 milliGRAM(s) IV Push two times a day  petrolatum Ophthalmic Ointment 1 Application(s) Both EYES four times a day  propofol Infusion 20 MICROgram(s)/kG/Min IV Continuous <Continuous>  sodium chloride 1 Gram(s) Oral two times a day  sodium chloride 0.9% Bolus. 250 milliLiter(s) IV Bolus every 5 minutes PRN  sodium chloride 0.9% lock flush 10 milliLiter(s) IV Push every 1 hour PRN  sodium chloride 3%  Inhalation 4 milliLiter(s) Inhalation every 6 hours      Vital Signs Last 24 Hrs  T(C): 36.7 (27 Nov 2023 08:00), Max: 37.3 (26 Nov 2023 12:00)  T(F): 98 (27 Nov 2023 08:00), Max: 99.1 (26 Nov 2023 12:00)  HR: 107 (27 Nov 2023 10:29) (79 - 111)  BP: --  BP(mean): --  RR: 22 (27 Nov 2023 09:00) (13 - 24)  SpO2: 96% (27 Nov 2023 10:29) (95% - 100%)    Parameters below as of 27 Nov 2023 10:29  Patient On (Oxygen Delivery Method): ventilator        PHYSICAL EXAM:  General: [ x] trach  HEAD/EYES: [ ] PERRL [ x] white sclera [ ] icterus  ENT:  [ ] normal [ ] supple [ ] thrush [ ] pharyngeal exudate  Cardiovascular:   [ ] murmur [ x] normal [ ] PPM/AICD  Respiratory:  [ ] clear to ausculation bilaterally  GI:  [ ] soft, non-tender, normal bowel sounds  :  [ ] willard [ ] no CVA tenderness   Musculoskeletal:  [ ] no synovitis  Neurologic:  [ ] non-focal exam   Skin:  [ ] no rash  Lymph: [ x] no lymphadenopathy  Psychiatric:  [ ] appropriate affect [ ] alert & oriented  Lines:  [ x] no phlebitis [ ] central line                                9.0    25.53 )-----------( 551      ( 27 Nov 2023 01:20 )             31.0       11-27    133<L>  |  99  |  38<H>  ----------------------------<  174<H>  4.6   |  24  |  1.79<H>    Ca    9.0      27 Nov 2023 01:20  Phos  4.0     11-27  Mg     2.30     11-27    TPro  6.4  /  Alb  1.7<L>  /  TBili  <0.2  /  DBili  x   /  AST  25  /  ALT  17  /  AlkPhos  387<H>  11-27      Urinalysis Basic - ( 27 Nov 2023 01:20 )    Color: x / Appearance: x / SG: x / pH: x  Gluc: 174 mg/dL / Ketone: x  / Bili: x / Urobili: x   Blood: x / Protein: x / Nitrite: x   Leuk Esterase: x / RBC: x / WBC x   Sq Epi: x / Non Sq Epi: x / Bacteria: x        MICROBIOLOGY:Culture Results:   No growth at 48 Hours (11-24-23 @ 18:30)  Culture Results:   No growth at 4 days (11-22-23 @ 09:30)  Culture Results:   Growth in anaerobic bottle: Staphylococcus epidermidis  Coagulase Negative Staphylococci isolated from a single blood culture set  may represent contamination.  Contact the Microbiology Department at [phone] if susceptibility  testing is clinically indicated.  Direct identification is available within approximately 3-5  hours either by Blood Panel Multiplexed PCR or Direct  MALDI-TOF. Details: [email or web address] (11-22-23 @ 09:00)      RADIOLOGY:

## 2023-11-27 NOTE — PROGRESS NOTE ADULT - ASSESSMENT
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 While in MICU patient noted with worsening renal failure requiring HD initiation, CGE/ Melena s/p EGD 8/31 found with esophagitis and erosive gastropathy, new right cerebellar infarct and fevers second to ESBL COLI and MSSA VAP, MSSA bacteremia, left ear otitis externa and drug rxn to cephalosporins Course further complicated by prolonged vent time s/p tracheostomy 9/1 and transferred to RCU 9/3 completed by recurrent fevers with high PIP, respiratory acidosis and concern for volume overloaded.   CTH repeated 9/26 for concern for encephalopathy - has known now - chronic bilateral cerebellar infarcts, L PCA infarct. L parietal hypodensity seen on prior CT likely artifactual  Repeat CTH 10/3 - unchanged  EEG 10/5 with L posterocentral/temporal spikes/sharp waves - doubt true focal seizure upon further review of EEG     Impression:   Suspect underlying movement d/o - PD?  Metabolic encephalopathy related to shock, infection, bihemispheric infarcts  Multiple embolic strokes s/p ILR without events  Dementia   MSSA bacteremia, s/p Daptomycin  recurrent L otitis externa  Acute popliteal DVT on Eliquis   Anemia     Recommendations   [] care per MICU for persistent hypotension, sepsis  [] sedation as needed for vent synchrony  [] Abx per ID   [] has multiple areas of epileptogenic potential on EEG, no clear seizure correlate seen.  Repeat EEG for R facial twitching negative.   [] would stop Keppra to see if mental status improves, doubt true seizures -> now off with more eye openin  [] consider MRI brain once stable but doubt will change mgmt  [] mgmt of hypotension per primary team, remains full code  [] Abx per ID  [] C/w home Atorvastatin 10 mg qhs   [] continue to address GOC with family, poor prognosis    Katty Charles DO  Vascular Neurology  Office [phone]

## 2023-11-27 NOTE — PROGRESS NOTE ADULT - SUBJECTIVE AND OBJECTIVE BOX
S:  Pt seen and examined. On propofol overnight for tachycardia and tachypnea, now on precedex still with tachypnea      Medications: MEDICATIONS  (STANDING):  albuterol/ipratropium for Nebulization 3 milliLiter(s) Nebulizer every 6 hours  artificial tears (preservative free) Ophthalmic Solution 1 Drop(s) Both EYES every 4 hours  aspirin  chewable 81 milliGRAM(s) Enteral Tube daily  atorvastatin 10 milliGRAM(s) Oral at bedtime  calamine/zinc oxide Lotion 1 Application(s) Topical daily  chlorhexidine 0.12% Liquid 15 milliLiter(s) Oral Mucosa every 12 hours  chlorhexidine 2% Cloths 1 Application(s) Topical daily  chlorhexidine 4% Liquid 1 Application(s) Topical <User Schedule>  dexMEDEtomidine Infusion 0.2 MICROgram(s)/kG/Hr (3.33 mL/Hr) IV Continuous <Continuous>  dextrose 5%. 1000 milliLiter(s) (50 mL/Hr) IV Continuous <Continuous>  dextrose 5%. 1000 milliLiter(s) (100 mL/Hr) IV Continuous <Continuous>  dextrose 50% Injectable 12.5 Gram(s) IV Push once  dextrose 50% Injectable 25 Gram(s) IV Push once  dextrose 50% Injectable 25 Gram(s) IV Push once  dextrose 50% Injectable 25 Gram(s) IV Push once  droxidopa 600 milliGRAM(s) Oral every 8 hours  ferrous    sulfate Liquid 300 milliGRAM(s) Oral daily  glucagon  Injectable 1 milliGRAM(s) IntraMuscular once  heparin   Injectable 5000 Unit(s) SubCutaneous every 8 hours  insulin lispro (ADMELOG) corrective regimen sliding scale   SubCutaneous every 6 hours  insulin NPH human recombinant 4 Unit(s) SubCutaneous every 6 hours  midodrine. 40 milliGRAM(s) Oral <User Schedule>  norepinephrine Infusion 0.05 MICROgram(s)/kG/Min (6.23 mL/Hr) IV Continuous <Continuous>  pantoprazole  Injectable 40 milliGRAM(s) IV Push two times a day  petrolatum Ophthalmic Ointment 1 Application(s) Both EYES four times a day  sodium chloride 1 Gram(s) Oral two times a day  sodium chloride 3%  Inhalation 4 milliLiter(s) Inhalation every 6 hours    MEDICATIONS  (PRN):  acetaminophen   Oral Liquid .. 650 milliGRAM(s) Oral every 6 hours PRN Temp greater or equal to 38C (100.4F), Mild Pain (1 - 3)  dextrose Oral Gel 15 Gram(s) Oral once PRN Blood Glucose LESS THAN 70 milliGRAM(s)/deciliter  diphenhydrAMINE Elixir 50 milliGRAM(s) Oral once PRN Rash and/or Itching  droxidopa 600 milliGRAM(s) Oral <User Schedule> PRN prior to hemodialysis  midodrine. 40 milliGRAM(s) Oral once PRN 1 Hour Pre HD  sodium chloride 0.9% Bolus. 250 milliLiter(s) IV Bolus every 5 minutes PRN SBP LESS THAN or EQUAL to 90 mmHg  sodium chloride 0.9% lock flush 10 milliLiter(s) IV Push every 1 hour PRN Pre/post blood products, medications, blood draw, and to maintain line patency       Vitals:  Vital Signs Last 24 Hrs  T(C): 36.3 (27 Nov 2023 12:00), Max: 37 (26 Nov 2023 17:00)  T(F): 97.4 (27 Nov 2023 12:00), Max: 98.6 (26 Nov 2023 17:00)  HR: 102 (27 Nov 2023 16:31) (79 - 112)  BP: --  BP(mean): --  RR: 24 (27 Nov 2023 15:00) (16 - 24)  SpO2: 100% (27 Nov 2023 16:31) (93% - 100%)    Parameters below as of 27 Nov 2023 16:31  Patient On (Oxygen Delivery Method): ventilator          LABS:                          9.0    25.53 )-----------( 551      ( 27 Nov 2023 01:20 )             31.0     11-27    133<L>  |  99  |  38<H>  ----------------------------<  174<H>  4.6   |  24  |  1.79<H>    Ca    9.0      27 Nov 2023 01:20  Phos  4.0     11-27  Mg     2.30     11-27    TPro  6.4  /  Alb  1.7<L>  /  TBili  <0.2  /  DBili  x   /  AST  25  /  ALT  17  /  AlkPhos  387<H>  11-27    LIVER FUNCTIONS - ( 27 Nov 2023 01:20 )  Alb: 1.7 g/dL / Pro: 6.4 g/dL / ALK PHOS: 387 U/L / ALT: 17 U/L / AST: 25 U/L / GGT: x             Urinalysis Basic - ( 27 Nov 2023 01:20 )    Color: x / Appearance: x / SG: x / pH: x  Gluc: 174 mg/dL / Ketone: x  / Bili: x / Urobili: x   Blood: x / Protein: x / Nitrite: x   Leuk Esterase: x / RBC: x / WBC x   Sq Epi: x / Non Sq Epi: x / Bacteria: x          Neurological Exam:  Mental Status: Eyes closed, minimal spont eye opening off sedation. Does not follow commands,  + trach to vent and NGT   Cranial Nerves: PERRL slightly sluggish, no further twitching or head dystonia  Motor: No spontaneous movement noted  Sensation: No WD to noxious    I personally reviewed the below data/images/labs:        < from: CT Head No Cont (08.15.23 @ 17:20) >    ACC: [id number] EXAM:  CT BRAIN   ORDERED BY: MINAL SAM     PROCEDURE DATE:  08/15/2023          INTERPRETATION:  Clinical indication: Change in neuro exam.    Multiple axial sections were performed from base to vertex without   contrast enhancement. Coronal and sagittal reconstructions were   reformatted well.    This exam is compared prior head CT performed on August 11, 2023    Parenchymal volume loss and chronic microvessel ischemic changes are   again seen.    Abnormal low-attenuation involving the left occipital cortical   subcortical region is again seen. 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    No evidence of acute hemorrhage mass or mass effect is seen.    Evaluation of the osseous structures with appropriate window demonstrates   sclerotic changes about the left mastoid region which appears stable.   Opacification left middle ear region is again seen.    Patient is status post bilateral cataract surgery.    Impression: Stable exam.    < end of copied text >    vEEG:    Clinical Impression:  - Severe diffuse non-specific cerebral dysfunction  - There were no epileptiform abnormalities or seizures recorded.        CTH 8/11:    VENTRICLES AND SULCI: Age-appropriate involutional change  INTRA-AXIAL:  Old left PCA infarct as seen on the prior unchanged.   Microvascular ischemic changes involving the periventricular and   subcortical white matter as seen previously  EXTRA-AXIAL:  No mass or collection is seen.  VISUALIZED SINUSES:  Clear.  VISUALIZED MASTOIDS: Left mastoid sclerosis  CALVARIUM: Infiltrative appearance tothe calvarium may be indicative of   marrow infiltration on the basis of patient's known diagnosis of breast   cancer. MISCELLANEOUS:  None.    IMPRESSION:  No significant interval change compared with 7/17/2023 in   left PCA infarct which is old. Microvascular ischemic changes involving   the periventricular and subcortical white matter as seen   previously.Questionable lesions at the level of the calvarium related to   possible breast CA. Clinical correlation recommended.    --- End of Report ---      ct< from: CT Head No Cont (09.26.23 @ 21:02) >  IMPRESSION: Vague questionable areas of hypoattenuation within the   bilateral cerebellar hemispheres which may be compatible with   acute/subacute ischemia. Recommend further evaluation with a brain MRI   study, provided there are no MRI contraindications.    No acute intracranial hemorrhage.    Previously seen questionable vague wedge-shaped area of hypoattenuation   in the left parietal lobe with artifactual secondary to motion and volume   averaging with a prominent sulcus.    Chronic left occipital lobe infarct.    < end of copied text >    < from: CT Head No Cont (10.03.23 @ 20:46) >    IMPRESSION:    No acute intracranial hemorrhage or mass effect. Evaluation of the   posterior fossa degraded by streak artifact from dental amalgam.   Lucencies in the bilateral cerebellum may be artifactual.    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    Bilateral middle ear and mastoid effusions which are also seen on prior   exam.    EEG Classification / Summary:  Abnormal EEG in the awake, drowsy states.   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   -No seizures

## 2023-11-27 NOTE — PROGRESS NOTE ADULT - SUBJECTIVE AND OBJECTIVE BOX
Subjective: Patient seen and examined. No new events except as noted.   remains in ICU       REVIEW OF SYSTEMS:  Unable to obtain     MEDICATIONS:  MEDICATIONS  (STANDING):  albuterol/ipratropium for Nebulization 3 milliLiter(s) Nebulizer every 6 hours  artificial tears (preservative free) Ophthalmic Solution 1 Drop(s) Both EYES every 4 hours  aspirin  chewable 81 milliGRAM(s) Enteral Tube daily  atorvastatin 10 milliGRAM(s) Oral at bedtime  calamine/zinc oxide Lotion 1 Application(s) Topical daily  chlorhexidine 0.12% Liquid 15 milliLiter(s) Oral Mucosa every 12 hours  chlorhexidine 2% Cloths 1 Application(s) Topical daily  chlorhexidine 4% Liquid 1 Application(s) Topical <User Schedule>  dextrose 5%. 1000 milliLiter(s) (100 mL/Hr) IV Continuous <Continuous>  dextrose 5%. 1000 milliLiter(s) (50 mL/Hr) IV Continuous <Continuous>  dextrose 50% Injectable 12.5 Gram(s) IV Push once  dextrose 50% Injectable 25 Gram(s) IV Push once  dextrose 50% Injectable 25 Gram(s) IV Push once  dextrose 50% Injectable 25 Gram(s) IV Push once  droxidopa 600 milliGRAM(s) Oral every 8 hours  epoetin odalis (EPOGEN) Injectable 03444 Unit(s) IV Push <User Schedule>  ferrous    sulfate Liquid 300 milliGRAM(s) Oral daily  glucagon  Injectable 1 milliGRAM(s) IntraMuscular once  heparin   Injectable 5000 Unit(s) SubCutaneous every 8 hours  insulin lispro (ADMELOG) corrective regimen sliding scale   SubCutaneous every 6 hours  insulin NPH human recombinant 4 Unit(s) SubCutaneous every 6 hours  midodrine. 40 milliGRAM(s) Oral <User Schedule>  norepinephrine Infusion 0.05 MICROgram(s)/kG/Min (6.23 mL/Hr) IV Continuous <Continuous>  pantoprazole  Injectable 40 milliGRAM(s) IV Push two times a day  petrolatum Ophthalmic Ointment 1 Application(s) Both EYES four times a day  propofol Infusion 20 MICROgram(s)/kG/Min (7.98 mL/Hr) IV Continuous <Continuous>  sodium chloride 1 Gram(s) Oral two times a day  sodium chloride 3%  Inhalation 4 milliLiter(s) Inhalation every 6 hours      PHYSICAL EXAM:  T(C): 36.7 (11-27-23 @ 08:00), Max: 37 (11-26-23 @ 17:00)  HR: 109 (11-27-23 @ 11:02) (79 - 111)  BP: --  RR: 22 (11-27-23 @ 09:00) (16 - 24)  SpO2: 95% (11-27-23 @ 11:02) (95% - 100%)  Wt(kg): --  I&O's Summary    26 Nov 2023 07:01  -  27 Nov 2023 07:00  --------------------------------------------------------  IN: 1083.1 mL / OUT: 125 mL / NET: 958.1 mL    27 Nov 2023 07:01  -  27 Nov 2023 12:25  --------------------------------------------------------  IN: 143.1 mL / OUT: 0 mL / NET: 143.1 mL              Appearance: NAD, +trach  +NGT  HEENT: dry oral mucosa  Lymphatic: No lymphadenopathy  Cardiovascular: Normal S1 S2, No JVD, No murmurs, No edema  Respiratory: Decreased BS, + trach to vent	  Neuro: opens eyes  Gastrointestinal: Soft, Non-tender, + BS, + NGT  Skin: No rashes, No ecchymoses, No cyanosis	  Extremities: No strength/ROM 2/2 sedation, + BL LE edema  Vascular: Peripheral pulses palpable 2+ bilaterally  Anasarca   Mode: AC/ CMV (Assist Control/ Continuous Mandatory Ventilation), RR (machine): 24, FiO2: 40, PEEP: 8, ITime: 0.8, MAP: 19, PC: 35, PIP: 43  Plateau pressure:   P/F ratio:           LABS:    CARDIAC MARKERS:                                9.0    25.53 )-----------( 551      ( 27 Nov 2023 01:20 )             31.0     11-27    133<L>  |  99  |  38<H>  ----------------------------<  174<H>  4.6   |  24  |  1.79<H>    Ca    9.0      27 Nov 2023 01:20  Phos  4.0     11-27  Mg     2.30     11-27    TPro  6.4  /  Alb  1.7<L>  /  TBili  <0.2  /  DBili  x   /  AST  25  /  ALT  17  /  AlkPhos  387<H>  11-27          TELEMETRY: SR ST  ECG:    RADIOLOGY:   DIAGNOSTIC TESTING:  [ ] Echocardiogram:  [ ]  Catheterization:  [ ] Stress Test:    OTHER:

## 2023-11-27 NOTE — PROGRESS NOTE ADULT - ASSESSMENT
73 paola old with DM, prior CVA, kidney disease  Now with respiratory failure  Requiring HD- but difficulty tolerating    S/p bronch with removal of secretions  Vent setting stable    Leukocytosis    Known colonization with MDRO pseudomonas    Leukocytosis is likely multifactorial.  Her WBC has been elevated since early October. Decreased without antibiotics last week . Now rising.     She will remain at high risk for recurrent infection including pneumonia, soft tissue injections and blood stream infections.     The risk of infection is higher with temporary lines than with a permcath.     If there is concern for infection repeat blood cultures, sputum culture and imaging. If she has a change in status, would give polymixin, vanco, and flagyl pending imaging and cultures.       Blood culture with MRSE in one bottle last week. Possible contaminant.  Repeat blood cultures without growth    Her overall prognosis is grave.  Her airways are chronically colonized.  She is at risk for recurrent pna with MDRO organisms and has limited treatment options. Prior cultures with MDR organisms and she had a drug reaction to cephalosporins.  At this time, her respiratory status is stable.     Ultimately, the decision re permcath placement is a risk benefit decision.     Prognosis grave. Options limited  Continue GOC discussions    Case dw ICU  Call ID service with questions

## 2023-11-27 NOTE — CHART NOTE - NSCHARTNOTEFT_GEN_A_CORE
: Anders    INDICATION: Respiratory failure    PROCEDURE:  [ ] LIMITED ECHO  [X] LIMITED CHEST  [ ] LIMITED RETROPERITONEAL  [ ] LIMITED ABDOMINAL  [ ] LIMITED DVT  [ ] NEEDLE GUIDANCE VASCULAR  [ ] NEEDLE GUIDANCE THORACENTESIS  [ ] NEEDLE GUIDANCE PARACENTESIS  [ ] NEEDLE GUIDANCE PERICARDIOCENTESIS  [ ] OTHER    FINDINGS: Large R pleural effusion with diaphragmatic eversion. Septations noted      INTERPRETATION: Large R pleural effusion with septations under tension.    Images stored on [employer].    Mitchell Mcnamara MD  Pulmonary & Critical Care

## 2023-11-28 NOTE — PROCEDURE NOTE - NSASSISTBY_GEN_A_CORE
Attending
Myself
PA
Lucinda Crooks/PA
Ramirez Frankel/PA
Lucinda Crooks/PA

## 2023-11-28 NOTE — PROGRESS NOTE ADULT - ASSESSMENT
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       NEUROLOGY  #AMS  # Multiple embolic strokes   # L PCA Infarct   MR head performed w/ new infarct and old infarcts, EEG without seizure events but with high foci potential   - EEG given facial twitching: negative for epileptiform activity (10/31)  - deferring repeat MRI as unlikely to   - baseline AOX3 at home per daughter, trached/non-verbal   - c/w ASA 81 mg   - s/p Nimbex 11/27 2/2 worsening respiratory status  - on propofol, wean as tolerated    CARDIOVASCULAR  # Septic vs vasoplegic shock   Etiology likely septic iso active infection. Possible component of vasoplegic, however no longer with active infection or on sedation. Off IV vasopressors.   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   - resolved    RESPIRATORY  # acute hypoxic resp failure second to volume overload, ventilator dependence   # Copious inline and oral thick tanned secretions   - CKD vs HFpEF w/ hypercarbia 2/2 volume overload requiring intubation, trach, and HD initiation  - Continue on albuterol and chest PT, cont 3 % INH, IPV  - Continue volume removal with HD  - hypoxemia w/ turning and repositioning, CXR 11/16 shows moderate right pleural effusion, worsening RUL atelectasis, left has small effusion and/or atelectasis  - Bronchoscopy bedside 11/16 noted with white secretions right > left  - Chest tube placed 11/27 2/2 worsening respiratory status     #tracheomalacia   - CT CHEST (9/8) with tracheomalacia, BL pleural effusions and persistent consolidations     GI  # GIB: last pRBC transfused 11/4 (10/14 before that)   - Continue on PPI BID  - patient had melena before 11/13 but H/H stable,  Eliquis was held for dark brown/black stool  - as per nursing staff, stool is black in the rectal system    # Dysphagia   - NGT- tolerating feeds, at goal- changed to nepro was per nutrition    RENAL  # ESRD, L IJ ilene   - HD MWF TIW with midodrine and droxidopa   - ICU for vasopressor assisted volume removal, cap to 1 pressor and not offering CRRT due to the comorbidities and prognosis   - f/u w/ nephrology: will continue to offer patient 1-pressor assisted HD as agreed prior, as long as she can tolerate HD maxed on 1 pressor, Levo to 0.3; she will be considered iHD candidate  - IR was planning PermCath 11/14; but procedure was cancelled due to pt in MICU, on vasopressors, and has leucocytosis  - [facility] ilene discontinued 11/16 for line holiday, trialysis catheter placed in [facility] on 11/17 however was removed 11/18 iso poor flow rates during HD.  - Per IR, patient is not a candidate for a tunneled HD catheter at this time given uptrending leukocytosis  - Jagrutiley placed 11/22 and viable, last HD 11/25    INFECTIOUS DISEASE  #Septic shock  , afebrile,  WBC - 15>27, LA wnls  #blood cx 9/1: MSSA with hypotension  repeat negative 9/4, 9/8 s/p 4 weeks of vanco/dapto through 10/2  #Colonized with  pseudomonas   # MSSA/ESBL E Coli PNA  # L otitis Externa s/p ENT debridement c/b Candida, s/p Meropenem and Fluconazole  # Proteus/ Pseudomonas in sputum, s/p Aztreonam   - recent Bcx 11/2 negative, sputum 11/2 with  pseudomonas- colonized   - repeat MRSA PCR neg  - afebrile now after completing course aztreonam 11/6  - monitoring off abx   - f/u BAL (11/16)- only afb was sent which is negative.   - f/u BCx 11/24 NGTD    # possible malignant otitis externa   # ESBL ECOLI and MSSA VAP c/b MSSA bacteremia   - hx of MSSA bacteremia, ESBL E. Coli sputum Cx, BAL w/ MSSA  - treated with abx   - eosinophilia workup: JORGE, ANCAs negative    HEME  # Anemia second to GIB vs renal disease   - multiple transfusions, last done 11/4  - Eliquis held 2/2 pt having melena, and now pt with black stools/dark brown  - c/w ASA 81mg  - c/w Heparin SQ     #Allergic transfusion reaction: per RCU documentation, developed erythematous rash across chest shortly after starting transfusion, blood bank consulted and diagnosed mild allergic rxn  - premedicate w/ benadryl and famotidine prior to future transfusions  - no issue w/ pRBC transfusion 11/4    VASCULAR   # LLE POP DVT   - on Eliquis (held as of 11/11 PM for procedure and also patient having melena), stools are black/dark brown  - restarted Heparin SQ prophylaxis 11/14 and will continue to hold off starting Eliquis given melena.  - SCDs    # HD access  - TRISHA TAYLOR (9/27 - 10/21), replaced 10/24 -11/15  - TRISHA taylor discontinued 11/16 for line holiday, trialysis catheter placed in [facility] on 11/17 however was removed 11/18 iso poor flow rates during HD.  - Shiley placed 11/22, viable for HD  -patient deemed not a candidate for a tunneled HD catheter given uptrending leukocytosis    ONC   # Hx of Breast CA   - Patient dx in 2018 and s/p B/L mastectomy (radical on right) and chemo and RT.     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       NEUROLOGY  #AMS  # Multiple embolic strokes   # L PCA Infarct   MR head performed w/ new infarct and old infarcts, EEG without seizure events but with high foci potential   - EEG given facial twitching: negative for epileptiform activity (10/31)  - deferring repeat MRI as unlikely to   - baseline AOX3 at home per daughter, trached/non-verbal   - c/w ASA 81 mg   - s/p Nimbex 11/27 2/2 worsening respiratory status  - on propofol, wean as tolerated    CARDIOVASCULAR  # Septic vs vasoplegic shock   Etiology likely septic iso active infection. Possible component of vasoplegic, however no longer with active infection or on sedation. Off IV vasopressors.   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   - resolved    RESPIRATORY  # acute hypoxic resp failure second to volume overload, ventilator dependence   # Copious inline and oral thick tanned secretions   #Pleural effusion  - CKD vs HFpEF w/ hypercarbia 2/2 volume overload requiring intubation, trach, and HD initiation  - Continue on albuterol and chest PT, cont 3 % INH, IPV  - Continue volume removal with HD  - hypoxemia w/ turning and repositioning, CXR 11/16 shows moderate right pleural effusion, worsening RUL atelectasis, left has small effusion and/or atelectasis  - Bronchoscopy bedside 11/16 noted with white secretions right > left  - Chest tube placed 11/27 2/2 pleural effusion and respiratory status, now with bloody output     #tracheomalacia   - CT CHEST (9/8) with tracheomalacia, BL pleural effusions and persistent consolidations     GI  # GIB: last pRBC transfused 11/4 (10/14 before that)   - Continue on PPI BID  - patient had melena before 11/13 but H/H stable,  Eliquis was held for dark brown/black stool  - as per nursing staff, stool is black in the rectal system    # Dysphagia   - NGT- tolerating feeds, at goal- changed to nepro was per nutrition    RENAL  # ESRD, L ARTHUR taylor   - HD MWF TIW with midodrine and droxidopa   - ICU for vasopressor assisted volume removal, cap to 1 pressor and not offering CRRT due to the comorbidities and prognosis   - f/u w/ nephrology: will continue to offer patient 1-pressor assisted HD as agreed prior, as long as she can tolerate HD maxed on 1 pressor, Levo to 0.3; she will be considered iHD candidate  - IR was planning PermCath 11/14; but procedure was cancelled due to pt in MICU, on vasopressors, and has leucocytosis  - [facility] ashlee discontinued 11/16 for line holiday, trialysis catheter placed in [facility] on 11/17 however was removed 11/18 iso poor flow rates during HD.  - Per IR, patient is not a candidate for a tunneled HD catheter at this time given uptrending leukocytosis  - Ashlee placed 11/22 and viable, last HD 11/28 however not tolerated due to BP    INFECTIOUS DISEASE  #Septic shock  , afebrile,  WBC - 15>27, LA wnls  #blood cx 9/1: MSSA with hypotension  repeat negative 9/4, 9/8 s/p 4 weeks of vanco/dapto through 10/2  #Colonized with  pseudomonas   # MSSA/ESBL E Coli PNA  # L otitis Externa s/p ENT debridement c/b Candida, s/p Meropenem and Fluconazole  # Proteus/ Pseudomonas in sputum, s/p Aztreonam   - recent Bcx 11/2 negative, sputum 11/2 with  pseudomonas- colonized   - BCx 11/24 NGTD  - f/u BAL (11/16)- only afb was sent which is negative.   - c/w Vanco, Polymyxin, Flagyl   - f/u BCx and pleural fluid cx 11/28    # possible malignant otitis externa   # ESBL ECOLI and MSSA VAP c/b MSSA bacteremia   - hx of MSSA bacteremia, ESBL E. Coli sputum Cx, BAL w/ MSSA  - treated with abx   - eosinophilia workup: JORGE, ANCAs negative    HEME  # Anemia second to GIB vs renal disease   - multiple transfusions, last done 11/4  - Eliquis held 2/2 pt having melena, and now pt with black stools/dark brown  - c/w ASA 81mg  - c/w Heparin SQ     #Allergic transfusion reaction: per RCU documentation, developed erythematous rash across chest shortly after starting transfusion, blood bank consulted and diagnosed mild allergic rxn  - premedicate w/ benadryl and famotidine prior to future transfusions  - no issue w/ pRBC transfusion 11/4    VASCULAR   # LLE POP DVT   - on Eliquis (held as of 11/11 PM for procedure and also patient having melena), stools are black/dark brown  - restarted Heparin SQ prophylaxis 11/14 and will continue to hold off starting Eliquis given melena.  - SCDs    # HD access  - TRISHA TAYLOR (9/27 - 10/21), replaced 10/24 -11/15  - TRISHA taylor discontinued 11/16 for line holiday, trialysis catheter placed in [facility] on 11/17 however was removed 11/18 iso poor flow rates during HD.  - Shiley placed 11/22, viable for HD  -patient deemed not a candidate for a tunneled HD catheter given uptrending leukocytosis    ONC   # Hx of Breast CA   - Patient dx in 2018 and s/p B/L mastectomy (radical on right) and chemo and RT.     ENDOCRINE  # IDDM2   - Continue on NPH 6U with moderate ISS   - Adjust as needed    SKIN  #Rash  - Small vesicular rash to abdomen which is old and improved, will CTM    ETHICS/ GOC    - Attempted palliative discussion however family not interested and wishes for FULL CODE  - Family continues to want everything done despite inability to tolerate HD on multiple oral pressor

## 2023-11-28 NOTE — PROCEDURE NOTE - NSANESTHESIA_GEN_A_CORE
no anesthesia administered
1% lidocaine
no anesthesia administered

## 2023-11-28 NOTE — PROGRESS NOTE ADULT - SUBJECTIVE AND OBJECTIVE BOX
INTERVAL HPI/OVERNIGHT EVENTS: Overnight had increased respiratory distress w/ ?agonal breathing. ABG showed pH <7 and pCO 115. Pt given Nimbex, chest tube placed for pleural effusion. Start on Vanco, polymyxin, and Flagyl.     SUBJECTIVE: Patient seen and examined at bedside. Mentation unchanged.    ROS: All negative except as listed above.    VITAL SIGNS:  ICU Vital Signs Last 24 Hrs  T(C): 37 (28 Nov 2023 10:30), Max: 37 (28 Nov 2023 10:30)  T(F): 98.6 (28 Nov 2023 10:30), Max: 98.6 (28 Nov 2023 10:30)  HR: 118 (28 Nov 2023 12:00) (102 - 123)  BP: --  BP(mean): --  ABP: 101/33 (28 Nov 2023 12:00) (72/45 - 158/62)  ABP(mean): 57 (28 Nov 2023 12:00) (48 - 98)  RR: 27 (28 Nov 2023 12:00) (0 - 30)  SpO2: 98% (28 Nov 2023 12:00) (91% - 100%)    O2 Parameters below as of 28 Nov 2023 12:00  Patient On (Oxygen Delivery Method): ventilator  O2 Flow (L/min): 98        Mode: AC/ CMV (Assist Control/ Continuous Mandatory Ventilation), RR (machine): 29, FiO2: 50, PEEP: 5, ITime: 0.8, MAP: 20, PC: 35, PIP: 43  Plateau pressure:   P/F ratio:     11-27 @ 07:01  -  11-28 @ 07:00  --------------------------------------------------------  IN: 1320.2 mL / OUT: 1630 mL / NET: -309.8 mL    11-28 @ 07:01  -  11-28 @ 12:14  --------------------------------------------------------  IN: 803.2 mL / OUT: 1100 mL / NET: -296.8 mL      CAPILLARY BLOOD GLUCOSE      POCT Blood Glucose.: 217 mg/dL (28 Nov 2023 11:11)      ECG: reviewed.    PHYSICAL EXAM:    CONSTITUTIONAL: Trached, critically ill    RESPIRATORY: +vent and coarse breath sounds auscultated. Chest tube in place with bloody output  CARDIOVASCULAR: Tachycardic and regular rhythm, normal S1 and S2, no murmur/rub/gallop;  Peripheral pulses are 2+ bilaterally  ABDOMEN: +NGT. distended. Nontender to palpation, normoactive bowel sounds, no rebound/guarding; No hepatosplenomegaly. Rectal tube w/ black output  MSK: no clubbing or cyanosis of digits  Extremities: Anasarca. 3+ BLE edema to knees, 1+ edema of bilateral thighs. 3+ edema of bilateral forearms  Neuro: Does not respond to verbal or physical stimuli. Does not follow commands      MEDICATIONS:  MEDICATIONS  (STANDING):  albuterol/ipratropium for Nebulization 3 milliLiter(s) Nebulizer every 6 hours  artificial tears (preservative free) Ophthalmic Solution 1 Drop(s) Both EYES every 4 hours  aspirin  chewable 81 milliGRAM(s) Enteral Tube daily  atorvastatin 10 milliGRAM(s) Oral at bedtime  calamine/zinc oxide Lotion 1 Application(s) Topical daily  chlorhexidine 0.12% Liquid 15 milliLiter(s) Oral Mucosa every 12 hours  chlorhexidine 2% Cloths 1 Application(s) Topical daily  chlorhexidine 4% Liquid 1 Application(s) Topical <User Schedule>  dexMEDEtomidine Infusion 0.2 MICROgram(s)/kG/Hr (3.33 mL/Hr) IV Continuous <Continuous>  dextrose 5%. 1000 milliLiter(s) (50 mL/Hr) IV Continuous <Continuous>  dextrose 5%. 1000 milliLiter(s) (100 mL/Hr) IV Continuous <Continuous>  dextrose 50% Injectable 12.5 Gram(s) IV Push once  dextrose 50% Injectable 25 Gram(s) IV Push once  dextrose 50% Injectable 25 Gram(s) IV Push once  dextrose 50% Injectable 25 Gram(s) IV Push once  droxidopa 600 milliGRAM(s) Oral every 8 hours  ferrous    sulfate Liquid 300 milliGRAM(s) Oral daily  glucagon  Injectable 1 milliGRAM(s) IntraMuscular once  heparin   Injectable 5000 Unit(s) SubCutaneous every 8 hours  insulin lispro (ADMELOG) corrective regimen sliding scale   SubCutaneous every 6 hours  insulin NPH human recombinant 6 Unit(s) SubCutaneous every 6 hours  metroNIDAZOLE  IVPB 500 milliGRAM(s) IV Intermittent every 8 hours  midodrine. 40 milliGRAM(s) Oral <User Schedule>  norepinephrine Infusion 0.05 MICROgram(s)/kG/Min (6.23 mL/Hr) IV Continuous <Continuous>  pantoprazole  Injectable 40 milliGRAM(s) IV Push two times a day  petrolatum Ophthalmic Ointment 1 Application(s) Both EYES four times a day  polymyxin B IVPB [id number] Unit(s) IV Intermittent every 12 hours  propofol Infusion 5 MICROgram(s)/kG/Min (2 mL/Hr) IV Continuous <Continuous>  sodium chloride 1 Gram(s) Oral two times a day  sodium chloride 3%  Inhalation 4 milliLiter(s) Inhalation every 6 hours    MEDICATIONS  (PRN):  acetaminophen   Oral Liquid .. 650 milliGRAM(s) Oral every 6 hours PRN Temp greater or equal to 38C (100.4F), Mild Pain (1 - 3)  dextrose Oral Gel 15 Gram(s) Oral once PRN Blood Glucose LESS THAN 70 milliGRAM(s)/deciliter  diphenhydrAMINE Elixir 50 milliGRAM(s) Oral once PRN Rash and/or Itching  droxidopa 600 milliGRAM(s) Oral <User Schedule> PRN prior to hemodialysis  midodrine. 40 milliGRAM(s) Oral once PRN 1 Hour Pre HD  sodium chloride 0.9% Bolus. 250 milliLiter(s) IV Bolus every 5 minutes PRN SBP LESS THAN or EQUAL to 90 mmHg  sodium chloride 0.9% lock flush 10 milliLiter(s) IV Push every 1 hour PRN Pre/post blood products, medications, blood draw, and to maintain line patency      ALLERGIES:  Allergies    isoniazid (Rash)  nafcillin (Unknown)  hydrALAZINE (Rash)  vitamin E (Short breath; Urticaria; Hives)  doxycycline (Rash)  cefepime (Rash)  NIFEdipine (Urticaria; Hives)  Zosyn (Rash)    Intolerances        LABS:                        8.7    32.28 )-----------( 562      ( 28 Nov 2023 00:10 )             31.2     11-28    128<L>  |  93<L>  |  40<H>  ----------------------------<  305<H>  4.6   |  28  |  1.65<H>    Ca    9.3      28 Nov 2023 00:10  Phos  5.1     11-28  Mg     2.30     11-28    TPro  6.3  /  Alb  1.6<L>  /  TBili  <0.2  /  DBili  x   /  AST  22  /  ALT  18  /  AlkPhos  354<H>  11-28    PT/INR - ( 27 Nov 2023 22:30 )   PT: 10.7 sec;   INR: 0.95 ratio         PTT - ( 27 Nov 2023 22:30 )  PTT:38.7 sec  Urinalysis Basic - ( 28 Nov 2023 00:10 )    Color: x / Appearance: x / SG: x / pH: x  Gluc: 305 mg/dL / Ketone: x  / Bili: x / Urobili: x   Blood: x / Protein: x / Nitrite: x   Leuk Esterase: x / RBC: x / WBC x   Sq Epi: x / Non Sq Epi: x / Bacteria: x      ABG:  pH, Arterial: 7.27 (11-28-23 @ 11:15)  pCO2, Arterial: 61 mmHg (11-28-23 @ 11:15)  pO2, Arterial: 171 mmHg (11-28-23 @ 11:15)  pH, Arterial: 7.17 (11-28-23 @ 03:28)  pCO2, Arterial: 74 mmHg (11-28-23 @ 03:28)  pO2, Arterial: 95 mmHg (11-28-23 @ 03:28)  pH, Arterial: 7.12 (11-28-23 @ 00:10)  pCO2, Arterial: 91 mmHg (11-28-23 @ 00:10)  pO2, Arterial: 225 mmHg (11-28-23 @ 00:10)  pH, Arterial: 6.97 (11-27-23 @ 21:46)  pCO2, Arterial: 120 mmHg (11-27-23 @ 21:46)  pO2, Arterial: 252 mmHg (11-27-23 @ 21:46)  pH, Arterial: 6.99 (11-27-23 @ 18:17)  pCO2, Arterial: 108 mmHg (11-27-23 @ 18:17)  pO2, Arterial: 90 mmHg (11-27-23 @ 18:17)  pH, Arterial: 6.96 (11-27-23 @ 16:45)  pCO2, Arterial: 116 mmHg (11-27-23 @ 16:45)  pO2, Arterial: 81 mmHg (11-27-23 @ 16:45)      vBG:    Micro:    Culture - Blood (collected 11-24-23 @ 18:30)  Source: .Blood Blood-Venous  Preliminary Report (11-27-23 @ 22:01):    No growth at 72 Hours    Culture - Blood (collected 11-22-23 @ 09:30)  Source: .Blood Blood-Peripheral  Final Report (11-27-23 @ 17:01):    No growth at 5 days    Culture - Blood (collected 11-22-23 @ 09:00)  Source: .Blood Blood-Venous  Gram Stain (11-23-23 @ 22:30):    Growth in anaerobic bottle: Gram positive cocci in pairs  Final Report (11-24-23 @ 20:04):    Growth in anaerobic bottle: Staphylococcus epidermidis    Coagulase Negative Staphylococci isolated from a single blood culture set    may represent contamination.    Contact the Microbiology Department at [phone] if susceptibility    testing is clinically indicated.    Direct identification is available within approximately 3-5    hours either by Blood Panel Multiplexed PCR or Direct    MALDI-TOF. Details: [email or web address]  Organism: Blood Culture PCR (11-24-23 @ 20:04)  Organism: Blood Culture PCR (11-24-23 @ 20:04)      Method Type: PCR      -  Staphylococcus epidermidis, Methicillin resistant: Detec    Culture - Blood (collected 11-02-23 @ 09:10)  Source: .Blood Blood-Peripheral  Final Report (11-07-23 @ 12:01):    No growth at 5 days    Culture - Blood (collected 11-02-23 @ 09:00)  Source: .Blood Blood-Peripheral  Final Report (11-07-23 @ 12:01):    No growth at 5 days        Culture - Sputum (collected 11-02-23 @ 10:24)  Source: ET Tube ET Tube  Gram Stain (11-02-23 @ 15:31):    Moderate polymorphonuclear leukocytes per low power field    No Squamous epithelial cells per low power field    Moderate Gram Negative Rods per oil power field  Final Report (11-04-23 @ 22:56):    Numerous Pseudomonas aeruginosa (Carbapenem Resistant)  Organism: Pseudomonas aeruginosa (Carbapenem Resistant) (11-04-23 @ 22:56)  Organism: Pseudomonas aeruginosa (Carbapenem Resistant) (11-04-23 @ 22:56)      Method Type: CarbaR      -  Resistance Gene to Carbapenem: Nondet  Organism: Pseudomonas aeruginosa (Carbapenem Resistant) (11-04-23 @ 22:56)      Method Type: SIMON      -  Amikacin: S <=16      -  Aztreonam: R >16      -  Cefepime: I 16      -  Ceftazidime: I 16      -  Ceftazidime/Avibactam: S <=4      -  Ceftolozane/tazobactam: S <=2      -  Ciprofloxacin: R >2      -  Gentamicin: R >8      -  Imipenem: R >8      -  Levofloxacin: R >4      -  Meropenem: R >8      -  Piperacillin/Tazobactam: R >64      -  Tobramycin: R >8        RADIOLOGY & ADDITIONAL TESTS: Reviewed.

## 2023-11-28 NOTE — PROCEDURE NOTE - NSSITEPREP_SKIN_A_CORE
chlorhexidine
chlorhexidine/Adherence to aseptic technique: hand hygiene prior to donning barriers (gown, gloves), don cap and mask, sterile drape over patient
chlorhexidine
chlorhexidine
chlorhexidine/Adherence to aseptic technique: hand hygiene prior to donning barriers (gown, gloves), don cap and mask, sterile drape over patient
chlorhexidine
alcohol/chlorhexidine/Adherence to aseptic technique: hand hygiene prior to donning barriers (gown, gloves), don cap and mask, sterile drape over patient
Adherence to aseptic technique: hand hygiene prior to donning barriers (gown, gloves), don cap and mask, sterile drape over patient
chlorhexidine
chlorhexidine
chlorhexidine/Adherence to aseptic technique: hand hygiene prior to donning barriers (gown, gloves), don cap and mask, sterile drape over patient
chlorhexidine/Adherence to aseptic technique: hand hygiene prior to donning barriers (gown, gloves), don cap and mask, sterile drape over patient
chlorhexidine/povidone iodine (if allergic to chlorhexidine)
chlorhexidine/Adherence to aseptic technique: hand hygiene prior to donning barriers (gown, gloves), don cap and mask, sterile drape over patient
chlorhexidine/Adherence to aseptic technique: hand hygiene prior to donning barriers (gown, gloves), don cap and mask, sterile drape over patient

## 2023-11-28 NOTE — PROGRESS NOTE ADULT - SUBJECTIVE AND OBJECTIVE BOX
S:  Pt seen and examined. chest tube placed with impovement in tachypnea    Medications: MEDICATIONS  (STANDING):  albuterol/ipratropium for Nebulization 3 milliLiter(s) Nebulizer every 6 hours  artificial  tears Solution 1 Drop(s) Both EYES every 4 hours  aspirin  chewable 81 milliGRAM(s) Enteral Tube daily  atorvastatin 10 milliGRAM(s) Oral at bedtime  calamine/zinc oxide Lotion 1 Application(s) Topical daily  chlorhexidine 0.12% Liquid 15 milliLiter(s) Oral Mucosa every 12 hours  chlorhexidine 2% Cloths 1 Application(s) Topical daily  chlorhexidine 4% Liquid 1 Application(s) Topical <User Schedule>  dexMEDEtomidine Infusion 0.2 MICROgram(s)/kG/Hr (3.33 mL/Hr) IV Continuous <Continuous>  dextrose 5%. 1000 milliLiter(s) (100 mL/Hr) IV Continuous <Continuous>  dextrose 5%. 1000 milliLiter(s) (50 mL/Hr) IV Continuous <Continuous>  dextrose 50% Injectable 25 Gram(s) IV Push once  dextrose 50% Injectable 25 Gram(s) IV Push once  dextrose 50% Injectable 25 Gram(s) IV Push once  dextrose 50% Injectable 12.5 Gram(s) IV Push once  droxidopa 600 milliGRAM(s) Oral every 8 hours  ferrous    sulfate Liquid 300 milliGRAM(s) Oral daily  glucagon  Injectable 1 milliGRAM(s) IntraMuscular once  heparin   Injectable 5000 Unit(s) SubCutaneous every 8 hours  insulin lispro (ADMELOG) corrective regimen sliding scale   SubCutaneous every 6 hours  insulin NPH human recombinant 6 Unit(s) SubCutaneous every 6 hours  metroNIDAZOLE  IVPB 500 milliGRAM(s) IV Intermittent every 8 hours  midodrine. 40 milliGRAM(s) Oral <User Schedule>  norepinephrine Infusion 0.05 MICROgram(s)/kG/Min (6.23 mL/Hr) IV Continuous <Continuous>  pantoprazole  Injectable 40 milliGRAM(s) IV Push two times a day  petrolatum Ophthalmic Ointment 1 Application(s) Both EYES four times a day  polymyxin B IVPB [id number] Unit(s) IV Intermittent every 12 hours  propofol Infusion 5 MICROgram(s)/kG/Min (2 mL/Hr) IV Continuous <Continuous>  sodium chloride 1 Gram(s) Oral two times a day  sodium chloride 3%  Inhalation 4 milliLiter(s) Inhalation every 6 hours    MEDICATIONS  (PRN):  acetaminophen   Oral Liquid .. 650 milliGRAM(s) Oral every 6 hours PRN Temp greater or equal to 38C (100.4F), Mild Pain (1 - 3)  dextrose Oral Gel 15 Gram(s) Oral once PRN Blood Glucose LESS THAN 70 milliGRAM(s)/deciliter  diphenhydrAMINE Elixir 50 milliGRAM(s) Oral once PRN Rash and/or Itching  droxidopa 600 milliGRAM(s) Oral <User Schedule> PRN prior to hemodialysis  midodrine. 40 milliGRAM(s) Oral once PRN 1 Hour Pre HD  sodium chloride 0.9% Bolus. 250 milliLiter(s) IV Bolus every 5 minutes PRN SBP LESS THAN or EQUAL to 90 mmHg  sodium chloride 0.9% lock flush 10 milliLiter(s) IV Push every 1 hour PRN Pre/post blood products, medications, blood draw, and to maintain line patency       Vitals:  Vital Signs Last 24 Hrs  T(C): 36.1 (28 Nov 2023 16:00), Max: 37 (28 Nov 2023 10:30)  T(F): 97 (28 Nov 2023 16:00), Max: 98.6 (28 Nov 2023 10:30)  HR: 117 (28 Nov 2023 19:19) (94 - 126)  BP: --  BP(mean): --  RR: 29 (28 Nov 2023 18:00) (0 - 30)  SpO2: 91% (28 Nov 2023 19:19) (84% - 100%)    Parameters below as of 28 Nov 2023 16:27  Patient On (Oxygen Delivery Method): ventilator            LABS:                          8.7    32.28 )-----------( 562      ( 28 Nov 2023 00:10 )             31.2     11-28    128<L>  |  93<L>  |  40<H>  ----------------------------<  305<H>  4.6   |  28  |  1.65<H>    Ca    9.3      28 Nov 2023 00:10  Phos  5.1     11-28  Mg     2.30     11-28    TPro  6.3  /  Alb  1.6<L>  /  TBili  <0.2  /  DBili  x   /  AST  22  /  ALT  18  /  AlkPhos  354<H>  11-28    LIVER FUNCTIONS - ( 28 Nov 2023 00:10 )  Alb: 1.6 g/dL / Pro: 6.3 g/dL / ALK PHOS: 354 U/L / ALT: 18 U/L / AST: 22 U/L / GGT: x           PT/INR - ( 27 Nov 2023 22:30 )   PT: 10.7 sec;   INR: 0.95 ratio         PTT - ( 27 Nov 2023 22:30 )  PTT:38.7 sec  Urinalysis Basic - ( 28 Nov 2023 00:10 )    Color: x / Appearance: x / SG: x / pH: x  Gluc: 305 mg/dL / Ketone: x  / Bili: x / Urobili: x   Blood: x / Protein: x / Nitrite: x   Leuk Esterase: x / RBC: x / WBC x   Sq Epi: x / Non Sq Epi: x / Bacteria: x          Neurological Exam:  Mental Status: Eyes closed, minimal spont eye opening off sedation. Does not follow commands,  + trach to vent and NGT   Cranial Nerves: PERRL slightly sluggish, no further twitching or head dystonia  Motor: No spontaneous movement noted  Sensation: No WD to noxious    I personally reviewed the below data/images/labs:        < from: CT Head No Cont (08.15.23 @ 17:20) >    ACC: [id number] EXAM:  CT BRAIN   ORDERED BY: MINAL SAM     PROCEDURE DATE:  08/15/2023          INTERPRETATION:  Clinical indication: Change in neuro exam.    Multiple axial sections were performed from base to vertex without   contrast enhancement. Coronal and sagittal reconstructions were   reformatted well.    This exam is compared prior head CT performed on August 11, 2023    Parenchymal volume loss and chronic microvessel ischemic changes are   again seen.    Abnormal low-attenuation involving the left occipital cortical   subcortical region is again seen. 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    No evidence of acute hemorrhage mass or mass effect is seen.    Evaluation of the osseous structures with appropriate window demonstrates   sclerotic changes about the left mastoid region which appears stable.   Opacification left middle ear region is again seen.    Patient is status post bilateral cataract surgery.    Impression: Stable exam.    < end of copied text >    vEEG:    Clinical Impression:  - Severe diffuse non-specific cerebral dysfunction  - There were no epileptiform abnormalities or seizures recorded.        CTH 8/11:    VENTRICLES AND SULCI: Age-appropriate involutional change  INTRA-AXIAL:  Old left PCA infarct as seen on the prior unchanged.   Microvascular ischemic changes involving the periventricular and   subcortical white matter as seen previously  EXTRA-AXIAL:  No mass or collection is seen.  VISUALIZED SINUSES:  Clear.  VISUALIZED MASTOIDS: Left mastoid sclerosis  CALVARIUM: Infiltrative appearance tothe calvarium may be indicative of   marrow infiltration on the basis of patient's known diagnosis of breast   cancer. MISCELLANEOUS:  None.    IMPRESSION:  No significant interval change compared with 7/17/2023 in   left PCA infarct which is old. Microvascular ischemic changes involving   the periventricular and subcortical white matter as seen   previously.Questionable lesions at the level of the calvarium related to   possible breast CA. Clinical correlation recommended.    --- End of Report ---      ct< from: CT Head No Cont (09.26.23 @ 21:02) >  IMPRESSION: Vague questionable areas of hypoattenuation within the   bilateral cerebellar hemispheres which may be compatible with   acute/subacute ischemia. Recommend further evaluation with a brain MRI   study, provided there are no MRI contraindications.    No acute intracranial hemorrhage.    Previously seen questionable vague wedge-shaped area of hypoattenuation   in the left parietal lobe with artifactual secondary to motion and volume   averaging with a prominent sulcus.    Chronic left occipital lobe infarct.    < end of copied text >    < from: CT Head No Cont (10.03.23 @ 20:46) >    IMPRESSION:    No acute intracranial hemorrhage or mass effect. Evaluation of the   posterior fossa degraded by streak artifact from dental amalgam.   Lucencies in the bilateral cerebellum may be artifactual.    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    Bilateral middle ear and mastoid effusions which are also seen on prior   exam.    EEG Classification / Summary:  Abnormal EEG in the awake, drowsy states.   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   -No seizures

## 2023-11-28 NOTE — PROGRESS NOTE ADULT - ASSESSMENT
73 [place] old with DM, prior CVA, kidney disease  Now with respiratory failure  Requiring HD- but difficulty tolerating    S/p bronch with removal of secretions  Vent setting stable    Leukocytosis    Known colonization with MDRO pseudomonas    S/p pigtail for septated pleural effusion    Continue polymixin/ flagyl  Continue vancomycin- dose by daily level    Follow up cultures    She will remain at high risk for recurrent infection including pneumonia, soft tissue injections and blood stream infections.     The risk of infection is higher with temporary lines than with a permcath.     Her overall prognosis is grave.  Her airways are chronically colonized.  She is at risk for recurrent pna with MDRO organisms and has limited treatment options. Prior cultures with MDR organisms and she had a drug reaction to cephalosporins.  At this time, her respiratory status is stable.     Ultimately, the decision re permcath placement is a risk benefit decision.     Prognosis grave. Options limited  Continue GOC discussions    Case dw ICU  Call ID service with questions

## 2023-11-28 NOTE — PROGRESS NOTE ADULT - SUBJECTIVE AND OBJECTIVE BOX
Subjective: Patient seen and examined. No new events except as noted.   remains in ICU   s/p r chest tube placement   REVIEW OF SYSTEMS:  Unable to obtain       MEDICATIONS:  MEDICATIONS  (STANDING):  albuterol/ipratropium for Nebulization 3 milliLiter(s) Nebulizer every 6 hours  artificial  tears Solution 1 Drop(s) Both EYES every 4 hours  aspirin  chewable 81 milliGRAM(s) Enteral Tube daily  atorvastatin 10 milliGRAM(s) Oral at bedtime  calamine/zinc oxide Lotion 1 Application(s) Topical daily  chlorhexidine 0.12% Liquid 15 milliLiter(s) Oral Mucosa every 12 hours  chlorhexidine 2% Cloths 1 Application(s) Topical daily  chlorhexidine 4% Liquid 1 Application(s) Topical <User Schedule>  dexMEDEtomidine Infusion 0.2 MICROgram(s)/kG/Hr (3.33 mL/Hr) IV Continuous <Continuous>  dextrose 5%. 1000 milliLiter(s) (100 mL/Hr) IV Continuous <Continuous>  dextrose 5%. 1000 milliLiter(s) (50 mL/Hr) IV Continuous <Continuous>  dextrose 50% Injectable 25 Gram(s) IV Push once  dextrose 50% Injectable 25 Gram(s) IV Push once  dextrose 50% Injectable 12.5 Gram(s) IV Push once  dextrose 50% Injectable 25 Gram(s) IV Push once  droxidopa 600 milliGRAM(s) Oral every 8 hours  ferrous    sulfate Liquid 300 milliGRAM(s) Oral daily  glucagon  Injectable 1 milliGRAM(s) IntraMuscular once  heparin   Injectable 5000 Unit(s) SubCutaneous every 8 hours  insulin lispro (ADMELOG) corrective regimen sliding scale   SubCutaneous every 6 hours  insulin NPH human recombinant 6 Unit(s) SubCutaneous every 6 hours  metroNIDAZOLE  IVPB 500 milliGRAM(s) IV Intermittent every 8 hours  midodrine. 40 milliGRAM(s) Oral <User Schedule>  norepinephrine Infusion 0.05 MICROgram(s)/kG/Min (6.23 mL/Hr) IV Continuous <Continuous>  pantoprazole  Injectable 40 milliGRAM(s) IV Push two times a day  petrolatum Ophthalmic Ointment 1 Application(s) Both EYES four times a day  polymyxin B IVPB [id number] Unit(s) IV Intermittent every 12 hours  propofol Infusion 5 MICROgram(s)/kG/Min (2 mL/Hr) IV Continuous <Continuous>  sodium chloride 1 Gram(s) Oral two times a day  sodium chloride 3%  Inhalation 4 milliLiter(s) Inhalation every 6 hours      PHYSICAL EXAM:  T(C): 37 (11-28-23 @ 10:30), Max: 37 (11-28-23 @ 10:30)  HR: 122 (11-28-23 @ 14:40) (94 - 123)  BP: --  RR: 29 (11-28-23 @ 14:40) (0 - 30)  SpO2: 100% (11-28-23 @ 14:40) (84% - 100%)  Wt(kg): --  I&O's Summary    27 Nov 2023 07:01  -  28 Nov 2023 07:00  --------------------------------------------------------  IN: 1320.2 mL / OUT: 1630 mL / NET: -309.8 mL    28 Nov 2023 07:01  -  28 Nov 2023 15:37  --------------------------------------------------------  IN: 803.2 mL / OUT: 1100 mL / NET: -296.8 mL          Appearance: NAD, +trach  +NGT  HEENT: dry oral mucosa  Lymphatic: No lymphadenopathy  Cardiovascular: Normal S1 S2, No JVD, No murmurs, No edema  Respiratory: Decreased BS, + trach to vent	  Neuro: opens eyes  Gastrointestinal: Soft, Non-tender, + BS, + NGT  Skin: No rashes, No ecchymoses, No cyanosis	  Extremities: No strength/ROM 2/2 sedation, + BL LE edema  Vascular: Peripheral pulses palpable 2+ bilaterally  Anasarca   Mode: AC/ CMV (Assist Control/ Continuous Mandatory Ventilation), RR (machine): 29, FiO2: 50, PEEP: 5, ITime: 0.8, MAP: 20, PC: 35, PIP: 43  Plateau pressure:   P/F ratio:             LABS:    CARDIAC MARKERS:                                8.7    32.28 )-----------( 562      ( 28 Nov 2023 00:10 )             31.2     11-28    128<L>  |  93<L>  |  40<H>  ----------------------------<  305<H>  4.6   |  28  |  1.65<H>    Ca    9.3      28 Nov 2023 00:10  Phos  5.1     11-28  Mg     2.30     11-28    TPro  6.3  /  Alb  1.6<L>  /  TBili  <0.2  /  DBili  x   /  AST  22  /  ALT  18  /  AlkPhos  354<H>  11-28    TELEMETRY: 	 SR   ECG:  	  RADIOLOGY: < from: Xray Chest 1 View- PORTABLE-Urgent (Xray Chest 1 View- PORTABLE-Urgent .) (11.27.23 @ 21:34) >    ACC: [id number] EXAM:  XR CHEST PORTABLE URGENT 1V   ORDERED BY: GABE HARRIS     ACC: [id number] EXAM:  XR CHEST PORTABLE URGENT 1V   ORDERED BY: NEGRO HOOVER     PROCEDURE DATE:  11/27/2023          INTERPRETATION:  CLINICAL INFORMATION: Endotracheal tube placement; chest   tube.    TIME OF EXAMINATION: November 27, 2023 at 5:58 PM and 9:24 PM    EXAM: Portable chest    FINDINGS:  5:58 PM:  Tracheostomy and enteric tubes are seen unchanged. Large layering right   effusion about the same. Small left effusion unchanged. Visible lungs are   clear. Heart difficult to evaluate on this view.    No pneumothorax.    9:24 PM:  The tracheostomy and enteric tube in addition to the right IJ   hemodialysis catheter are unchanged.    Right-sided pigtail catheter has been introduced and the effusion has   decreased. No complicating pneumothorax.    Left lung and possible small effusion on this side are unchanged.  Heart size is stable.        COMPARISON: November 22        IMPRESSION: Follow-up studies pre and post pigtail catheter placement on   right side with decreasing right effusion.    --- End of Report ---            < end of copied text >  < from: Xray Chest 1 View- PORTABLE-Urgent (Xray Chest 1 View- PORTABLE-Urgent .) (11.27.23 @ 18:29) >    ACC: [id number] EXAM:  XR CHEST PORTABLE URGENT 1V   ORDERED BY: GABE HARRIS     ACC: [id number] EXAM:  XR CHEST PORTABLE URGENT 1V   ORDERED BY: NEGRO   USENKO     PROCEDURE DATE:  11/27/2023          INTERPRETATION:  CLINICAL INFORMATION: Endotracheal tube placement; chest   tube.    TIME OF EXAMINATION: November 27, 2023 at 5:58 PM and 9:24 PM    EXAM: Portable chest    FINDINGS:  5:58 PM:  Tracheostomy and enteric tubes are seen unchanged. Large layering right   effusion about the same. Small left effusion unchanged. Visible lungs are   clear. Heart difficult to evaluate on this view.    No pneumothorax.    9:24 PM:  The tracheostomy and enteric tube in addition to the right IJ   hemodialysis catheter are unchanged.    Right-sided pigtail catheter has been introduced and the effusion has   decreased. No complicating pneumothorax.    Left lung and possible small effusion on this side are unchanged.  Heart size is stable.        COMPARISON: November 22        IMPRESSION: Follow-up studies pre and post pigtail catheter placement on   right side with decreasing right effusion.    --- End of Report ---            < end of copied text >    DIAGNOSTIC TESTING:  [ ] Echocardiogram:  [ ]  Catheterization:  [ ] Stress Test:    OTHER:

## 2023-11-28 NOTE — PROCEDURE NOTE - NSPERFORMEDBY_GEN_A_CORE
Myself
Angy Jaquez MD/Myself
Myself

## 2023-11-28 NOTE — PROCEDURE NOTE - PROCEDURE DATE TIME, MLM
07-Sep-2023 18:08
16-Sep-2023 12:31
24-Oct-2023 00:00
27-Sep-2023 13:30
28-Sep-2023 01:00
12-Aug-2023 04:30
16-Oct-2023 17:47
01-Sep-2023 11:30
22-Sep-2023 16:20
27-Sep-2023 16:21
27-Sep-2023 23:00
28-Oct-2023 11:40
11-Aug-2023 21:15
27-Nov-2023 22:00
19-Aug-2023 17:39
22-Nov-2023 20:00
16-Nov-2023 17:00
22-Nov-2023 15:25
16-Nov-2023 16:30
17-Nov-2023 11:00
26-Sep-2023 16:35
27-Sep-2023 13:00
29-Sep-2023 14:08

## 2023-11-28 NOTE — PROGRESS NOTE ADULT - SUBJECTIVE AND OBJECTIVE BOX
On HD now - hypotensive - UF shut off as a result      VITAL:  T(C): , Max: 36.7 (11-28-23 @ 00:00)  T(F): , Max: 98.1 (11-28-23 @ 07:10)  HR: 120 (11-28-23 @ 09:27)  BP: 99/34  RR: 30 (11-28-23 @ 08:00)  SpO2: 100% (11-28-23 @ 09:27)  Wt(kg): --      PHYSICAL EXAM:  Constitutional: critically ill, trach to vent   HEENT: + NGT, + tracheostomy   Respiratory: coarse BS  Cardiovascular: tachy s1s2  Gastrointestinal: BS+, soft, NT/ND  Extremities: ++ generalized edema  : No Wheeler  Skin: No rashes  Access: [facility]     LABS:                        8.7    32.28 )-----------( 562      ( 28 Nov 2023 00:10 )             31.2     Na(128)/K(4.6)/Cl(93)/HCO3(28)/BUN(40)/Cr(1.65)Glu(305)/Ca(9.3)/Mg(2.30)/PO4(5.1)    11-28 @ 00:10  Na(128)/K(4.9)/Cl(94)/HCO3(28)/BUN(39)/Cr(1.69)Glu(357)/Ca(9.2)/Mg(2.40)/PO4(5.6)    11-27 @ 22:30  Na(133)/K(4.6)/Cl(99)/HCO3(24)/BUN(38)/Cr(1.79)Glu(174)/Ca(9.0)/Mg(2.30)/PO4(4.0)    11-27 @ 01:20  Na(136)/K(3.6)/Cl(100)/HCO3(26)/BUN(32)/Cr(1.67)Glu(176)/Ca(8.7)/Mg(2.10)/PO4(2.9)    11-26 @ 01:05      IMPRESSION: 73F w/ HTN, DM2, CVA, breast CA-bilateral mastectomy, recurrent aspiration pneumonia/respiratory failure, and CKD, 8/11/23 p/w acute hypercapnic respiratory failure; c/b RUSS    (1)Renal - RUSS on CKD4 ==>newly ESRD-HD as of this admission. On HD now    (2)CV- hypotensive - UF shut off for today    (3)Pulm- trach/vent-dependent    (4)Anemia- on Epogen with HD    (5)GOC - s/p repeated discussions with family regarding GOC - family persisting with interest in aggressive measures.       RECOMMEND:  (1)HD today - no net UF  (2)Pressors as needed per ICU team          Jimmy Colon MD  [facility]  Office/on call physician: [phone]  Cell (7a-7p): [phone]       On HD now - hypotensive - UF shut off as a result      VITAL:  T(C): , Max: 36.7 (11-28-23 @ 00:00)  T(F): , Max: 98.1 (11-28-23 @ 07:10)  HR: 120 (11-28-23 @ 09:27)  BP: 99/34  RR: 30 (11-28-23 @ 08:00)  SpO2: 100% (11-28-23 @ 09:27)  Wt(kg): --      PHYSICAL EXAM:  Constitutional: critically ill, trach to vent   HEENT: + NGT, + tracheostomy   Respiratory: coarse BS  Cardiovascular: tachy s1s2  Gastrointestinal: BS+, soft, NT/ND  Extremities: ++ generalized edema  : No Wheeler  Skin: No rashes  Access: [facility]     LABS:                        8.7    32.28 )-----------( 562      ( 28 Nov 2023 00:10 )             31.2     Na(128)/K(4.6)/Cl(93)/HCO3(28)/BUN(40)/Cr(1.65)Glu(305)/Ca(9.3)/Mg(2.30)/PO4(5.1)    11-28 @ 00:10  Na(128)/K(4.9)/Cl(94)/HCO3(28)/BUN(39)/Cr(1.69)Glu(357)/Ca(9.2)/Mg(2.40)/PO4(5.6)    11-27 @ 22:30  Na(133)/K(4.6)/Cl(99)/HCO3(24)/BUN(38)/Cr(1.79)Glu(174)/Ca(9.0)/Mg(2.30)/PO4(4.0)    11-27 @ 01:20  Na(136)/K(3.6)/Cl(100)/HCO3(26)/BUN(32)/Cr(1.67)Glu(176)/Ca(8.7)/Mg(2.10)/PO4(2.9)    11-26 @ 01:05      IMPRESSION: 73F w/ HTN, DM2, CVA, breast CA-bilateral mastectomy, recurrent aspiration pneumonia/respiratory failure, and CKD, 8/11/23 p/w acute hypercapnic respiratory failure; c/b RUSS    (1)Renal - RUSS on CKD4 ==>newly ESRD-HD as of this admission. On HD now    (2)Hyponatremia - ESRD-associated - should improve with HD today    (3)CV- hypotensive - UF shut off for today    (4)Pulm- trach/vent-dependent    (5)Anemia- on Epogen with HD    (6)GOC - s/p repeated discussions with family regarding GOC - family persisting with interest in aggressive measures.       RECOMMEND:  (1)HD today - no net UF  (2)Pressors as needed per ICU team          Jimmy Colon MD  [facility]  Office/on call physician: [phone]  Cell (7a-7a): [phone]       On HD now - hypotensive - UF reduced as a result and placed on Levophed gtt    VITAL:  T(C): , Max: 36.7 (11-28-23 @ 00:00)  T(F): , Max: 98.1 (11-28-23 @ 07:10)  HR: 120 (11-28-23 @ 09:27)  BP: 99/34  RR: 30 (11-28-23 @ 08:00)  SpO2: 100% (11-28-23 @ 09:27)  Wt(kg): --      PHYSICAL EXAM:  Constitutional: critically ill, trach to vent   HEENT: + NGT, + tracheostomy   Respiratory: coarse BS  Cardiovascular: tachy s1s2  Gastrointestinal: BS+, soft, NT/ND  Extremities: ++ generalized edema  : No Wheeler  Skin: No rashes  Access: [facility]     LABS:                        8.7    32.28 )-----------( 562      ( 28 Nov 2023 00:10 )             31.2     Na(128)/K(4.6)/Cl(93)/HCO3(28)/BUN(40)/Cr(1.65)Glu(305)/Ca(9.3)/Mg(2.30)/PO4(5.1)    11-28 @ 00:10  Na(128)/K(4.9)/Cl(94)/HCO3(28)/BUN(39)/Cr(1.69)Glu(357)/Ca(9.2)/Mg(2.40)/PO4(5.6)    11-27 @ 22:30  Na(133)/K(4.6)/Cl(99)/HCO3(24)/BUN(38)/Cr(1.79)Glu(174)/Ca(9.0)/Mg(2.30)/PO4(4.0)    11-27 @ 01:20  Na(136)/K(3.6)/Cl(100)/HCO3(26)/BUN(32)/Cr(1.67)Glu(176)/Ca(8.7)/Mg(2.10)/PO4(2.9)    11-26 @ 01:05      IMPRESSION: 73F w/ HTN, DM2, CVA, breast CA-bilateral mastectomy, recurrent aspiration pneumonia/respiratory failure, and CKD, 8/11/23 p/w acute hypercapnic respiratory failure; c/b RUSS    (1)Renal - RUSS on CKD4 ==>newly ESRD-HD as of this admission. On HD now    (2)Hyponatremia - ESRD-associated - should improve with HD today    (3)CV- hypotensive - UF shut off for today    (4)Pulm- trach/vent-dependent    (5)Anemia- on Epogen with HD    (6)GOC - s/p repeated discussions with family regarding GOC - family persisting with interest in aggressive measures.       RECOMMEND:  (1)HD today - UF as able (~0.8kg removed near end of HD)  (2)Pressors as needed per ICU team          Jimmy Colon MD  [facility]  Office/on call physician: [phone]  Cell (7a-7p): [phone]

## 2023-11-28 NOTE — PROCEDURE NOTE - NSINDICATIONS_GEN_A_CORE
antibiotic therapy
dialysis/CRRT
blood sampling
critical illness/dialysis/CRRT
emergency venous access
antibiotic therapy/fluid administration
dialysis/CRRT
feeding tube replacement
dialysis/CRRT
emergency venous access
arterial puncture to obtain ABG's/critical patient/monitoring purposes
critical illness/emergency venous access
arterial puncture to obtain ABG's/cannulation purposes/critical patient/monitoring purposes
dialysis/CRRT/emergency venous access
pleural effusion

## 2023-11-28 NOTE — PROGRESS NOTE ADULT - ASSESSMENT
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 While in MICU patient noted with worsening renal failure requiring HD initiation, CGE/ Melena s/p EGD 8/31 found with esophagitis and erosive gastropathy, new right cerebellar infarct and fevers second to ESBL COLI and MSSA VAP, MSSA bacteremia, left ear otitis externa and drug rxn to cephalosporins Course further complicated by prolonged vent time s/p tracheostomy 9/1 and transferred to RCU 9/3 completed by recurrent fevers with high PIP, respiratory acidosis and concern for volume overloaded.   CTH repeated 9/26 for concern for encephalopathy - has known now - chronic bilateral cerebellar infarcts, L PCA infarct. L parietal hypodensity seen on prior CT likely artifactual  Repeat CTH 10/3 - unchanged  EEG 10/5 with L posterocentral/temporal spikes/sharp waves - doubt true focal seizure upon further review of EEG     Impression:   Suspect underlying movement d/o - PD?  Metabolic encephalopathy related to shock, infection, bihemispheric infarcts  Multiple embolic strokes s/p ILR without events  Dementia   MSSA bacteremia, s/p Daptomycin  recurrent L otitis externa  Acute popliteal DVT on Eliquis   Anemia     Recommendations   [] care per MICU for persistent hypotension, sepsis  [] sedation as needed for vent synchrony  [] Abx per ID   [] has multiple areas of epileptogenic potential on EEG, no clear seizure correlate seen.  Repeat EEG for R facial twitching negative.   [] would stop Keppra to see if mental status improves, doubt true seizures -> now off with more eye openin  [] consider MRI brain once stable but doubt will change mgmt  [] mgmt of hypotension per primary team, remains full code  [] Abx per ID  [] C/w home Atorvastatin 10 mg qhs   [] continue to address GOC with family, poor prognosis    Katty Charles DO  Vascular Neurology  Office [phone]

## 2023-11-28 NOTE — PROGRESS NOTE ADULT - SUBJECTIVE AND OBJECTIVE BOX
Follow Up:      Inverval History/ROS:Patient is a 73y old  Female who presents with a chief complaint of Respiratory distress (28 Nov 2023 09:45)    Bedside US with pleural fluid with septations  S/p chest tube  HD this am    Allergies    isoniazid (Rash)  nafcillin (Unknown)  hydrALAZINE (Rash)  vitamin E (Short breath; Urticaria; Hives)  doxycycline (Rash)  cefepime (Rash)  NIFEdipine (Urticaria; Hives)  Zosyn (Rash)    Intolerances        ANTIMICROBIALS:  metroNIDAZOLE  IVPB 500 every 8 hours  polymyxin B IVPB [id number] every 12 hours      OTHER MEDS:  acetaminophen   Oral Liquid .. 650 milliGRAM(s) Oral every 6 hours PRN  albuterol/ipratropium for Nebulization 3 milliLiter(s) Nebulizer every 6 hours  artificial tears (preservative free) Ophthalmic Solution 1 Drop(s) Both EYES every 4 hours  aspirin  chewable 81 milliGRAM(s) Enteral Tube daily  atorvastatin 10 milliGRAM(s) Oral at bedtime  calamine/zinc oxide Lotion 1 Application(s) Topical daily  chlorhexidine 0.12% Liquid 15 milliLiter(s) Oral Mucosa every 12 hours  chlorhexidine 2% Cloths 1 Application(s) Topical daily  chlorhexidine 4% Liquid 1 Application(s) Topical <User Schedule>  dexMEDEtomidine Infusion 0.2 MICROgram(s)/kG/Hr IV Continuous <Continuous>  dextrose 5%. 1000 milliLiter(s) IV Continuous <Continuous>  dextrose 5%. 1000 milliLiter(s) IV Continuous <Continuous>  dextrose 50% Injectable 12.5 Gram(s) IV Push once  dextrose 50% Injectable 25 Gram(s) IV Push once  dextrose 50% Injectable 25 Gram(s) IV Push once  dextrose 50% Injectable 25 Gram(s) IV Push once  dextrose Oral Gel 15 Gram(s) Oral once PRN  diphenhydrAMINE Elixir 50 milliGRAM(s) Oral once PRN  droxidopa 600 milliGRAM(s) Oral every 8 hours  droxidopa 600 milliGRAM(s) Oral <User Schedule> PRN  ferrous    sulfate Liquid 300 milliGRAM(s) Oral daily  glucagon  Injectable 1 milliGRAM(s) IntraMuscular once  heparin   Injectable 5000 Unit(s) SubCutaneous every 8 hours  insulin lispro (ADMELOG) corrective regimen sliding scale   SubCutaneous every 6 hours  insulin NPH human recombinant 6 Unit(s) SubCutaneous every 6 hours  midodrine. 40 milliGRAM(s) Oral <User Schedule>  midodrine. 40 milliGRAM(s) Oral once PRN  norepinephrine Infusion 0.05 MICROgram(s)/kG/Min IV Continuous <Continuous>  pantoprazole  Injectable 40 milliGRAM(s) IV Push two times a day  petrolatum Ophthalmic Ointment 1 Application(s) Both EYES four times a day  propofol Infusion 5 MICROgram(s)/kG/Min IV Continuous <Continuous>  sodium chloride 1 Gram(s) Oral two times a day  sodium chloride 0.9% Bolus. 250 milliLiter(s) IV Bolus every 5 minutes PRN  sodium chloride 0.9% lock flush 10 milliLiter(s) IV Push every 1 hour PRN  sodium chloride 3%  Inhalation 4 milliLiter(s) Inhalation every 6 hours      Vital Signs Last 24 Hrs  T(C): 37 (28 Nov 2023 10:30), Max: 37 (28 Nov 2023 10:30)  T(F): 98.6 (28 Nov 2023 10:30), Max: 98.6 (28 Nov 2023 10:30)  HR: 118 (28 Nov 2023 11:18) (102 - 123)  BP: --  BP(mean): --  RR: 29 (28 Nov 2023 10:30) (0 - 30)  SpO2: 100% (28 Nov 2023 11:18) (91% - 100%)    Parameters below as of 28 Nov 2023 10:30  Patient On (Oxygen Delivery Method): ventilator        PHYSICAL EXAM:  General: [x ] trach  HEAD/EYES: [ ] PERRL [ x] white sclera [ ] icterus  ENT:  [ ] normal [ x] supple [ ] thrush [ ] pharyngeal exudate  Cardiovascular:   [ ] murmur x[ ] normal [ ] PPM/AICD  Respiratory:  [ x] clear to ausculation bilaterally  GI:  [x ] soft, non-tender, normal bowel sounds  :  [ ] willard [x ] no CVA tenderness   Musculoskeletal:  [ ] no synovitis  Neurologic:  [ ] non-focal exam   Skin:  [x ] no rash  Lymph: [x ] no lymphadenopathy  Psychiatric:  [ ] appropriate affect [ ] alert & oriented  Lines:  [x ] no phlebitis [ ] central line                                8.7    32.28 )-----------( 562      ( 28 Nov 2023 00:10 )             31.2       11-28    128<L>  |  93<L>  |  40<H>  ----------------------------<  305<H>  4.6   |  28  |  1.65<H>    Ca    9.3      28 Nov 2023 00:10  Phos  5.1     11-28  Mg     2.30     11-28    TPro  6.3  /  Alb  1.6<L>  /  TBili  <0.2  /  DBili  x   /  AST  22  /  ALT  18  /  AlkPhos  354<H>  11-28      Urinalysis Basic - ( 28 Nov 2023 00:10 )    Color: x / Appearance: x / SG: x / pH: x  Gluc: 305 mg/dL / Ketone: x  / Bili: x / Urobili: x   Blood: x / Protein: x / Nitrite: x   Leuk Esterase: x / RBC: x / WBC x   Sq Epi: x / Non Sq Epi: x / Bacteria: x        MICROBIOLOGY:Culture Results:   Testing in progress (11-27-23 @ 21:25)  Culture Results:   No growth at 72 Hours (11-24-23 @ 18:30)  Culture Results:   No growth at 5 days (11-22-23 @ 09:30)  Culture Results:   Growth in anaerobic bottle: Staphylococcus epidermidis  Coagulase Negative Staphylococci isolated from a single blood culture set  may represent contamination.  Contact the Microbiology Department at [phone] if susceptibility  testing is clinically indicated.  Direct identification is available within approximately 3-5  hours either by Blood Panel Multiplexed PCR or Direct  MALDI-TOF. Details: [email or web address] (11-22-23 @ 09:00)      RADIOLOGY:

## 2023-11-28 NOTE — PROCEDURE NOTE - PRACTITIONER PERFORMING THE TIME OUT
Elissa Freitas Pa-C
Rudi DUNBAR
Elissa Freitas
Karen Lee NP
Myself with nursing staff
Angy Jaquez MD
Myself with nursing staff
Rdui Arnold
Antoni Wolf
Nani Xavier
Nani Xavier
Elissa Freitas PA-C
Pablito DUNBAR
Ramirez Frankel
Dr. Beltran
Angy Jaquez MD

## 2023-11-28 NOTE — PROCEDURE NOTE - NSAPPROACH_GEN_A_CORE
via natural/artificial opening
peripheral
percutaneous
peripheral
percutaneous
percutaneous

## 2023-11-28 NOTE — PROCEDURE NOTE - NSPOSTPRCRAD_GEN_A_CORE
central line located in the/central line located in the superior vena cava
chest tube in correct position
central line located in the/central line located in the superior vena cava/post-procedure radiography performed
central line located in the superior vena cava/no pneumothorax/post-procedure radiography performed
chest radiograph/feeding tube in correct gastric position/post-procedure radiography performed
18cm depth/central line located in the superior vena cava/no pneumothorax/post-procedure radiography performed
post-procedure radiography performed
pending CXR/post-procedure radiography performed

## 2023-11-28 NOTE — PROCEDURE NOTE - NSPROCDETAILS_GEN_ALL_CORE
Seldinger technique/dressing applied/secured in place/sterile dressing applied/supine position/percutaneous/ultrasound assessment of fluid (location)
blood seen on insertion/dressing applied/flushes easily
location identified, draped/prepped, sterile technique used/blood seen on insertion/dressing applied/flushes easily/secured in place/sterile technique, catheter placed
guidewire recovered/lumen(s) aspirated and flushed/sterile dressing applied/sterile technique, catheter placed/ultrasound guidance with use of sterile gel and probe cove
location identified, draped/prepped, sterile technique used/sterile dressing applied/sterile technique, catheter placed/supine position/ultrasound guidance
guidewire recovered/lumen(s) aspirated and flushed/sterile dressing applied/sterile technique, catheter placed/ultrasound guidance with use of sterile gel and probe cove
location identified, draped/prepped, sterile technique used/blood seen on insertion/dressing applied/flushes easily
US used for guidance/location identified, draped/prepped, sterile technique used/blood seen on insertion/dressing applied/flushes easily/secured in place/sterile technique, catheter placed
guidewire recovered/lumen(s) aspirated and flushed/sterile dressing applied/sterile technique, catheter placed/ultrasound guidance with use of sterile gel and probe cove
location identified, draped/prepped, sterile technique used, needle inserted/introduced/positive blood return obtained via catheter/connected to a pressurized flush line/sutured in place/hemostasis with direct pressure, dressing applied/Seldinger technique/all materials/supplies accounted for at end of procedure
location identified, draped/prepped, sterile technique used, needle inserted/introduced/positive blood return obtained via catheter/connected to a pressurized flush line/sutured in place/hemostasis with direct pressure, dressing applied/Seldinger technique/all materials/supplies accounted for at end of procedure
guidewire recovered/lumen(s) aspirated and flushed/sterile dressing applied/sterile technique, catheter placed/ultrasound guidance with use of sterile gel and probe cove

## 2023-11-28 NOTE — PROGRESS NOTE ADULT - ATTENDING COMMENTS
74 yo F PMH T2DM, HTN, CKD now dialysis dependent, HFpEF, breast ca s/p bilateral mastectomy, chemotherapy, and radiation (2018), hx of cardiac arrest (2018), L pca occipital cva here with prolonged hospitalization c/b chronic respiratory failure, renal failure, MDR infections, and AMS. She is now in persistent shock requiring oral vasopressors with minimal improvement in mental status.    intermittently hypoxic and agitated requiring sedation  required chest tube yesterday for pleural effusion and worsening acute hypoxemic and hypercapnic respiratory failure   somewhat improved, but still in shock  suspect new pseudomonas pneumonia    # acute hypoxemic respiratory failure  # shock  # ESRD on HD  # pseudomonas pneumonia  - keep chest tube on suction; c/w broad abx  - c/w vasopressors for shock, patient very tachycardic though  - hsq for dvt ppx  - HD as per renal    Her lung compliance is very poor unfortunately, requiring PC inspiratory pressure of 38 PEEP 5 for TV of barely 200 cc.  Despite this, she is having worsening hypercapnia even with sedation.  She has a very poor prognosis for recovery.  I believe she is actively dying. Continuing with medical management, but very poor prognosis. d/w family at bedside.

## 2023-11-28 NOTE — PROCEDURE NOTE - NSPROCNAME_GEN_A_CORE
Chest Tube
Feeding Tube Replacement
Bronchoscopy
Arterial Puncture/Cannulation
Central Line Insertion
Bronchoscopy
ILR Interrogation
Bronchoscopy
Central Line Insertion
Arterial Puncture/Cannulation
Peripheral Line Insertion
Central Line Insertion
Peripheral Line Insertion
Central Line Insertion
Peripheral Line Insertion
Feeding Tube Placement
Midline Insertion
Peripheral Line Insertion
Central Line Insertion
Arterial Puncture/Cannulation
Bronchoscopy
Peripheral Line Insertion
Central Line Insertion

## 2023-11-28 NOTE — PROCEDURE NOTE - NSPATIENTPOSTION_GEN_A_CORE
Trendelenburg
supine
Trendelenburg
supine

## 2023-11-28 NOTE — PROGRESS NOTE ADULT - PROBLEM SELECTOR PLAN 1
Multifactorial     - Aspiration, acute on chronic diastolic CHF   s/p trach 9/1  now with likely sepsis   s/p pigtail catheter   on polymyxin for resistant pseudomonas

## 2023-11-28 NOTE — PROCEDURE NOTE - NSINFORMCONSENT_GEN_A_CORE
Benefits, risks, and possible complications of procedure explained to patient/caregiver who verbalized understanding and gave written consent.
This was an emergent procedure.
This was an emergent procedure.
Benefits, risks, and possible complications of procedure explained to patient/caregiver who verbalized understanding and gave verbal consent.
This was an emergent procedure.
from /Benefits, risks, and possible complications of procedure explained to patient/caregiver who verbalized understanding and gave verbal consent.
Benefits, risks, and possible complications of procedure explained to patient/caregiver who verbalized understanding and gave verbal consent.
Benefits, risks, and possible complications of procedure explained to patient/caregiver who verbalized understanding and gave written consent.
Benefits, risks, and possible complications of procedure explained to patient/caregiver who verbalized understanding and gave verbal consent.
Benefits, risks, and possible complications of procedure explained to patient/caregiver who verbalized understanding and gave written consent.
Benefits, risks, and possible complications of procedure explained to patient/caregiver who verbalized understanding and gave verbal consent.
Benefits, risks, and possible complications of procedure explained to patient/caregiver who verbalized understanding and gave verbal consent.
Benefits, risks, and possible complications of procedure explained to patient/caregiver who verbalized understanding and gave written consent.
Benefits, risks, and possible complications of procedure explained to patient/caregiver who verbalized understanding and gave verbal consent.
This was an emergent procedure.
Benefits, risks, and possible complications of procedure explained to patient/caregiver who verbalized understanding and gave verbal consent.
Benefits, risks, and possible complications of procedure explained to patient/caregiver who verbalized understanding and gave verbal consent.
This was an emergent procedure.

## 2023-11-28 NOTE — PROCEDURE NOTE - NSTIMEOUT_GEN_A_CORE
Patient's first and last name, , procedure, and correct site confirmed prior to the start of procedure.

## 2023-11-29 NOTE — PROGRESS NOTE ADULT - CRITICAL CARE ATTENDING COMMENT
73 F with CKD5, wheel chair bound, prior CVA here with prolonged hospital course now trach, recurrent infections, now dialysis dependent here with shock of unclear etiology    HD catheter removed and leukocytosis improving, inserted again overnight for HD today  off eliquis, on heparin gtt and restarted today  on full vent support, PS trials as tolerated  completing aztreonam for pseudomonas pneumonia  needs vasopressors for HD, gave extra dose of droxidopa today but still needed IV vasopressors for shock  ongoing GOC
74 yo F w/ T2DM, CKD, HFpEF, h/o CVA and trach and PEG (now removed) admitted to MICU for acute hypoxemic and hypercapnic respiratory failure requiring intubation in setting of RUSS and acute pulmonary edema, possible ATN vs AIN    # Acute hypoxemic and hypercapnic respiratory failure  # RUSS/ATN/AIN  # Acute pulmonary edema  # HTN    - Attempt SAT/SBT  - c/w labetalol and clonidine   - HD as per renal  - c/w prednisone for possible AIN, plan for 14 days then wean  - Adjust insulin    #Hematemesis - No further coffee ground in NGT  #R cerbella CVA  - Resume feeds as per GI  - Resume ASA  - Trend CBC    Patient examined and case reviewed in detail on bedside rounds. Frequent bedside visits with therapy change today.  Prognosis guarded.
74 yo F w/ T2DM, CKD, HFpEF, h/o CVA and trach and PEG (now removed) admitted to MICU for acute hypoxemic and hypercapnic respiratory failure requiring intubation in setting of RUSS and acute pulmonary edema, possible ATN vs AIN    # Acute hypoxemic and hypercapnic respiratory failure  # RUSS/ATN/AIN  # Acute pulmonary edema  # HTN  # acute blood loss anemia    - Follow up GI, likely plan for EGD  - Cont PPI BID  - Trend CBC  - c/w labetalol and clonidine   - HD as per renal  - c/w prednisone for possible AIN, plan for 14 days then wean  - Adjust insulin    Patient examined and case reviewed in detail on bedside rounds. Frequent bedside visits with therapy change today.  Prognosis guarded.
Critically ill patient requiring frequent bedside visits with therapy changes.
Patient is a 74 yo F (wheelchair bound prior to admission) w/ HFpEF, T2DM, CKD5, latent TB (s/p tx,) hx breast ca (2018, s/p mastectomy and adjuvant CT/RT), and hx prior CVA s/p trach/PEG (decannulated prior to admission, ILR in place) who was initially admitted on 8/11/23 after p/w with acute hypoxemic and hypercapnic respiratory failure thought to be 2/2 Aspiration PNA vs ADHF/flash pulmonary edema in setting of HTN emergency. Patient required intubation and mechanical ventilation intubation, and was admitted to MICU for lengthy period of time.     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     Hospital course also marked by recurrent infections, including MSSA bacteremia (s/p Vanc due to possible cefepime vs. cefazolin drug-induced rash) and MSSA/ESBL EColi in BAL. Also found to hve L otitis externa (9/5) s/p ENT debridement c/b concern for superimposed candida infection (s/p Meropenem, Fluconazole). Most recently w/ Proteus/Pseudomonas in sputum now s/p Aztreonam. Also further found to have L popliteal DVT.    Patient transferred back to MICU for trial of HD, possibly requiring IV vasopressors. Has had persistent need for vasopressors during HD and also on high doses of oral vasopressors.     # CVA  # Acute on chronic hypoxemic respiratory failure  # Ostitis externa  # Melena  # ESRD on HD  # functional quadriplegia  # Shock on pressors during HD.  # DVT  - Wean vasopressors to maintain MAP > 65, currently needing them during HD sessions.   - C/W Vent, PS trial as tolerated, adjust based on blood gas  - S/P ENT and abx for OE, now off. Currently monitoring off all abx.   - C/W PPI, H/H stable, monitor for continued melena. A/C and ASA on hold.  - DVT, was on A/C but held 2/2 to melena and permacath. Will restart a/c when able.  - HD as per renal, continuing HD with pressors. Patient unlikely to tolerated long term HD.   - c/w tube feeds  - DVT ppx - none 2/2 to DVT and GIB  - Dispo- Per family is full code. Dismal prognosis over all given multiple strokes. Poor functional status and pressors dependency. Multiple GOC done by multiple providers. Family is still very hopeful for recovery.
Pt is a 73F with PMHx IDDMII, CKD Stage IV, CHFpEF, latent TB (s/p tx,) hx breast cancer (2018, s/p mastectomy and adjuvant CT/RT), and hx CVA s/p trach/PEG (now decannulated) with residual hemiparesis initially presenting to [facility] on 8/11/23 with acute hypoxemic and hypercapnic respiratory failure s/p ETT intubation/MV and ARF with pulmonary edema c/b HTN emergency requiring nicardipine drip transferred to MICU for further management with failure to wean from ventilator s/p tracheostomy placement and RCU transfer for further management followed by recurrent ARF and shock requiring transfer back to MICU 9/26.    Pt initially presenting to hospital with worsening RUE shaking and dysconjugate gaze with MRI 8/17 (+) new right cerebellar, midbrain, and multiple tiny embolic infarcts as well as known left PCA CVA. EEG (-). STEPHANIE (-) on 8/17 but with evidence of 2 linear mobile echogenic elements on AV leaflets likely 2/2 Lambel's excrescence but with no TRINITY thrombus. ILR in place from prior CVA evaluation, last interrogated 9/7 without AF. At baseline, pt reportedly wheelchair bound with RUE tremors and some ADL assistance. Pt was unable to tolerate ASA 2/2 GIB, will re-attempt once more stable. Worsening mentation x2 weeks possibly 2/2 metabolic/infectious encephalopathy in the setting of hyponatremia, uremia, and recurrent infection. No new focal deficits noted. Pt intermittently awakens but only for very short periods of time. Repeat CT Head performed 2/2 persistent unresponsiveness with concern for possible new left parietal infarct vs. artifact. Discussed with pt's neurologist, will come to evaluate pt today.    Pt with acute hypoxemic and hypercapnic respiratory failure s/p ETT intubation/MV possibly 2/2 flash pulmonary edema in the setting of HTN emergency +/- aspiration event. Pt with failure of ventilatory weaning now s/p tracheostomy placement (IP 9/1 Ashlee #6 cuffed). Over the past 24 hrs, pt with increasing O2 requirements and respiratory acidosis with poor TVs. Bedside bronchoscopy (+) severe dynamic airway collapse into distal end of tracheostomy extending to main ines and RM bronchus. Bedside exchange of tracheostomy performed to [facility] with repeat bronchoscopy showing some but not complete improvement in dynamic collapse. Of note, pt also noted to have evidence of midbrain CVA with possible concern for central effect on respiratory drive. Airway clearance therapy in place, Wean O2 supplementation for goal O2 saturation 90-95%. Oral hygiene and aspiration precautions in place. Trach care and suctioning as per RCU team.      Pt p/w ARF on CKD requiring initiation of HD 2/2 pulmonary edema and metabolic acidosis with metabolic encephalopathy in MICU. Now off HD since 9/20. Pt initially with (+) UOP on diuretics but now in the setting of worsening shock state, pt with new oliguria as well as progressive anasarca and pulmonary edema. Plan to replace Shiley and trial HD this afternoon. Stress-dose steroids restarted. Pressor requirements decreased. Bedside POCUS with normal cardiac fxn, no evidence of RV overload, and with bilateral pleural effusions with b-lines c/w pulmonary edema.      Hospital course further c/b recurrent infections, most recently with MSSA bacteremia (9/1, cleared 9/4 and 9/8) with (+) MSSA and ESBL EColi in BAL as well. Pt with possible cefepime vs. cefazolin drug-induced rash followed by transition to vancomycin but then with recurrence of rash transitioned to daptomycin (9/26.) Pt a/w left otitis externa (9/5) s/p serial bedside debridement with worsening necrotic tissue/erythema for which she was also initiated on meropenem. Pt underwent ENT debridement x2 (last 9/21) found to have new black spores and white discoloration with concern for superimposed candidal infection. Now with uveitis vs. endophthalmitis, appreciate opthalmology input. Given fevers and need for debridement fluconazole added to regimen 9/21. Meropenem completed, CT imaging does not show significant enough bone involvement to require prolonged course for osteomyelitis. Given worsening shock state, pan CT scan performed overnight with no other sources of infection noted. Will c/w fluconazole + daptomycin for now with close monitoring. Pressor requirements slowly downtrending.     Pt also with oropharyngeal dysphagia requiring NGTs followed by UGIB s/p EGD (8/31) found to have esophagitis/erosive gastropathy now on PPI BID. Pt further found to have popliteal DVT. No c/i by GI for A/C, pt initiated on heparin drip with patient-specific pTTs. Held pending Shiley placement. Will need to ultimately transition to Coumadin x3-6 months after PEG placement.     Dispo pending medical optimization. Pt full code. [facility] readdressed with pt's family (daughter and  at bedside) who are hopeful for clinical improvement.
72 yo F (wheelchair bound prior to admission) w/ HFpEF, T2DM, CKD5, latent TB (s/p tx,) hx breast ca (2018, s/p mastectomy and adjuvant CT/RT), and hx prior CVA s/p trach/PEG (decannulated prior to admission, ILR in place) who was initially admitted on 8/11/23 after p/w with acute hypoxemic and hypercapnic respiratory failure thought to be 2/2 Aspiration PNA vs ADHF/flash pulmonary edema in setting of HTN emergency. Patient required intubation and mechanical ventilation intubation, and was admitted to MICU for lengthy period of time.     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     Hospital course also marked by recurrent infections, including MSSA bacteremia (s/p Vanc due to possible cefepime vs. cefazolin drug-induced rash) and MSSA/ESBL EColi in BAL. Also found to hve L otitis externa (9/5) s/p ENT debridement c/b concern for superimposed candida infection (s/p Meropenem, Fluconazole). Most recently w/ Proteus/Pseudomonas in sputum now s/p Aztreonam. Also further found to have L popliteal DVT.    Patient transferred back to MICU for trial of HD, possibly requiring IV vasopressors. Has had persistent need for vasopressors during HD and also on high doses of oral vasopressors.     # CVA  # Acute on chronic hypoxemic respiratory failure  # Melena  # ESRD on HD  # functional quadriplegia  # Shock on pressors during HD.  # DVT  # HX of  pna.   - Persistent encephelopathy 2/2 to multiple strokes  - Wean vasopressors to maintain MAP > 65, currently needing them during HD sessions. S/P HD but current HD cath with poor flow rates and had to be withdrawn.   - C/W Vent, PS trial as tolerated, adjust based on blood gas; bronch with some secretions, clearing now, no need for thoracentesis  - S/P ENT and abx for OE, now off. Currently monitoring off all abx.   - C/W PPI, H/H stable, monitor for continued melena. A/C on hold. tolerating asa.    - DVT, was on A/C but held 2/2 to melena. hsq for now  - HD as per renal, continuing HD with pressors. Patient unlikely to tolerated long term HD.   - c/w tube feed  - Hx of  pneumonia- agree with ID given lack of treatment options for abx, will limited to if patient is truly septic,/in shock.  - DVT ppx - none 2/2 to DVT and GIB  - Dispo- Per family is full code. Dismal prognosis over all given multiple strokes. Poor functional status and pressors dependency. Multiple GOC done by multiple providers. Family is still very hopeful for recovery. Will continue to have on going GOC.
72 yo F w/ T2DM, CKD, HFpEF, h/o CVA and trach and PEG (now removed) admitted to MICU for acute hypoxemic and hypercapnic respiratory failure requiring intubation in setting of RUSS and acute pulmonary edema, possible ATN vs AIN    # Acute hypoxemic and hypercapnic respiratory failure  # RUSS/ATN/AIN  # Acute pulmonary edema  # HTN    - Cont to attempt SAT/SBT, discussed trach at bedside with family today  - c/w labetalol and clonidine   - HD as per renal  - c/w prednisone for possible AIN, plan for 14 days then wean  - Adjust insulin    #Hematemesis - No further coffee ground in NGT  #R cerbella CVA      Patient examined and case reviewed in detail on bedside rounds. Frequent bedside visits with therapy change today.  Prognosis guarded.
72 yo F w/ T2DM, CKD, HFpEF, h/o CVA and trach and PEG (now removed) admitted to MICU for acute hypoxemic and hypercapnic respiratory failure requiring intubation in setting of RUSS and acute pulmonary edema, possible ATN vs AIN    # Acute hypoxemic and hypercapnic respiratory failure  # RUSS/ATN/AIN  # Acute pulmonary edema  # HTN    - Cont to attempt SAT/SBT, plan for trach tomorrow  - c/w labetalol and clonidine   - HD as per renal  - c/w prednisone for possible AIN, plan for 14 days then wean  - Adjust insulin    #Hematemesis - No further coffee ground in NGT  #R cerbella CVA      Patient examined and case reviewed in detail on bedside rounds. Frequent bedside visits with therapy change today.  Prognosis guarded.
72 yo F (wheelchair bound prior to admission) w/ HFpEF, T2DM, CKD5, latent TB (s/p tx,) hx breast ca (2018, s/p mastectomy and adjuvant CT/RT), and hx prior CVA s/p trach/PEG (decannulated prior to admission, ILR in place) who was initially admitted on 8/11/23 after p/w with acute hypoxemic and hypercapnic respiratory failure thought to be 2/2 Aspiration PNA vs ADHF/flash pulmonary edema in setting of HTN emergency. Patient required intubation and mechanical ventilation intubation, and was admitted to MICU for lengthy period of time.     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     Hospital course also marked by recurrent infections, including MSSA bacteremia (s/p Vanc due to possible cefepime vs. cefazolin drug-induced rash) and MSSA/ESBL EColi in BAL. Also found to hve L otitis externa (9/5) s/p ENT debridement c/b concern for superimposed candida infection (s/p Meropenem, Fluconazole). Most recently w/ Proteus/Pseudomonas in sputum now s/p Aztreonam. Also further found to have L popliteal DVT.    Patient transferred back to MICU for trial of HD, possibly requiring IV vasopressors. Has had persistent need for vasopressors during HD and also on high doses of oral vasopressors.     11/16: worsening hypoxemia with turns, cxr with worsening R sided opacities    # CVA  # Acute on chronic hypoxemic respiratory failure  # Ostitis externa  # Melena  # ESRD on HD  # functional quadriplegia  # Shock on pressors during HD.  # DVT  - Persistent encephelopathy 2/2 to multiple strokes  - Wean vasopressors to maintain MAP > 65, currently needing them during HD sessions. s/p HD today and now off pressors  - C/W Vent, PS trial as tolerated, adjust based on blood gas; will likely need bronch and/or thora based on imaging  - S/P ENT and abx for OE, now off. Currently monitoring off all abx.   - C/W PPI, H/H stable, monitor for continued melena. A/C on hold. tolerating asa.    - DVT, was on A/C but held 2/2 to melena and permacath. Will restart a/c when able.  - HD as per renal, continuing HD with pressors. Patient unlikely to tolerated long term HD. Zulayfabi removed 11/15 for line holiday, will reinsert in coming days.  - c/w tube feeds  - DVT ppx - none 2/2 to DVT and GIB  - Dispo- Per family is full code. Dismal prognosis over all given multiple strokes. Poor functional status and pressors dependency. Multiple GOC done by multiple providers. Family is still very hopeful for recovery. Will continue to have on going GOC.
72 yo F (wheelchair bound prior to admission) w/ HFpEF, T2DM, CKD5, latent TB (s/p tx,) hx breast ca (2018, s/p mastectomy and adjuvant CT/RT), and hx prior CVA s/p trach/PEG (decannulated prior to admission, ILR in place) who was initially admitted on 8/11/23 after p/w with acute hypoxemic and hypercapnic respiratory failure thought to be 2/2 Aspiration PNA vs ADHF/flash pulmonary edema in setting of HTN emergency. Patient required intubation and mechanical ventilation/intubation, and was admitted to MICU for lengthy period of time.     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     Hospital course also marked by recurrent infections, most recently with MSSA bacteremia (now on Vanc through 10/2 due to possible cefepime vs. cefazolin drug-induced rash) and MSSA/ESBL EColi in BAL. Also found to have L otitis externa (9/5) s/p ENT debridement x2 (last 9/21) c/b concern for superimposed candida infection (s/p Meropenem, now on Fluconazole since 9/21). Also further found to have L popliteal DVT (initiated on heparin gtt).    Patient transferred back to MICU for worsening hypotension as well as rising creatinine/BUN in setting of ARF on CKD.    #Shock  #Encephalopathy  #Multiple Strokes  #New Acute/Subacute L parietal Stroke  #UGIB  #L popliteal DVT  #L fungal otitis externa  #L endophthalmitis  #Chronic respiratory failure  #Oropharyngeal dysphagia  #RUSS on CKD  #Pleural effusions  - c/w vasopressors PRN to maintain MAP > 65. stress dose steroids Hydrocort 25 q8h today, leave at this for now  - c/w mechanical ventilatory support, trach care per MICU team. Changed to XLT trach given excessive dynamic airway collapse  - f/u blood, sputum, urine cultures  - c/w Dapto as per ID for MSSA bacteremia. Bradford as per ID. s/p Fluconazole for fungal otitis externa. f/u ID recs  - c/w Ofloxacin eye drops and Erythromycin ointment. On Valtrex for possible HSV as per Optho. f/u Optho recs  - getting HD again today.  Monitor BMP and UOP. f/u renal recs re further HD and diuresis  - c/w tube feeds, will need eventual PEG. c/w PPI BID  - c/w heparin gtt for L popliteal DVT  - will get repeat tte as per neuro reccs  - f/u Optho recs  - TOV    Critically ill patient requiring frequent bedside visits with therapy changes.
73 F with CKD5, wheel chair bound, prior CVA here with prolonged hospital course now trach, recurrent infections, now dialysis dependent here with shock of unclear etiology    HD catheter removed and leukocytosis improving, inserted again on 10/24 for HD   off eliquis, on heparin gtt and tolerating  on full vent support, PS trials as tolerated  completing aztreonam for pseudomonas pneumonia  needs vasopressors for HD, will increase midodrine and droxidopa for shock  ongoing [facility].
Critically ill patient requiring frequent bedside visits with therapy changes.
Medical complexities, medical management as above, review of results/records, discussion with patient and primary team, documentation.
Patient is a 72 yo F (wheelchair bound prior to admission) w/ HFpEF, T2DM, CKD5, latent TB (s/p tx,) hx breast ca (2018, s/p mastectomy and adjuvant CT/RT), and hx prior CVA s/p trach/PEG (decannulated prior to admission, ILR in place) who was initially admitted on 8/11/23 after p/w with acute hypoxemic and hypercapnic respiratory failure thought to be 2/2 Aspiration PNA vs ADHF/flash pulmonary edema in setting of HTN emergency. Patient required intubation and mechanical ventilationtubation, and was admitted to MICU for lengthy period of time.     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     Hospital course also marked by recurrent infections, most recently with MSSA bacteremia (now on Vanc through 10/2 due to possible cefepime vs. cefazolin drug-induced rash) and MSSA/ESBL EColi in BAL. Also found to have L otitis externa (9/5) s/p ENT debridement x2 (last 9/21) c/b concern for superimposed candida infection (s/p Meropenem, now on Fluconazole since 9/21). Also further found to have L popliteal DVT (initiated on heparin gtt).    Patient transferred back to MICU for worsening hypotension as well as rising creatinine/BUN in setting of ARF on CKD.    #Shock  #Encephalopathy  #Multiple Strokes  #New Acute/Subacute L parietal Stroke  #UGIB  #L popliteal DVT  #L fungal otitis externa  #L endophthalmitis  #Chronic respiratory failure  #Oropharyngeal dysphagia  #RUSS on CKD  #Pleural effusions  - c/w vasopressors PRN to maintain MAP > 65. Wean stress dose steroids to Hydrocort 25 q8h  - c/w mechanical ventilatory support, trach care per MICU team. Changed to XLT trach given excessive dynamic airway collapse  - f/u blood, sputum, urine cultures  - c/w Dapto as per ID for MSSA bacteremia. Will add Bradford as per ID. s/p Fluconazole for fungal otitis externa. f/u ID recs  - c/w Ofloxacin eye drops and Erythromycin ointment. Will start Valtrex for possible HSV as per Optho. f/u Optho recs  - s/p HD x2 last 2 days. c/w diuresis. Monitor BMP and UOP. f/u renal recs re further HD  - c/w tube feeds, will need eventual PEG. 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 Challenge w/ ASA  - f/u Optho recs  - TOV
agree with above   seen with neurology team  spoke iwht  at bedside   no eeg correlate with tremor   Anoop Bianchi MD  Vascular Neurology  Office: [phone]
74 yo F w/ T2DM, CKD, HFpEF, h/o CVA and trach and PEG (now removed) admitted to MICU for acute hypoxemic and hypercapnic respiratory failure requiring intubation in setting of RUSS and acute pulmonary edema, possible ATN vs AIN    # Acute hypoxemic and hypercapnic respiratory failure  # RUSS/ATN/AIN  # Acute pulmonary edema  # HTN  # acute blood loss anemia  #hypotension    - Check cultures and start empiric abx  - Will plan for trach and bronch after with BAL  - Hgb stable, cont PPI BID, GI recs   - Holding antihypertensives  - HD as per renal  - c/w prednisone for possible AIN, plan for 14 days then wean which will start tomorrow    Patient examined and case reviewed in detail on bedside rounds. Frequent bedside visits with therapy change today.  Prognosis guarded.
: 74 yo F (wheelchair bound prior to admission) w/ HFpEF, T2DM, CKD5, latent TB (s/p tx,) hx breast ca (2018, s/p mastectomy and adjuvant CT/RT), and hx prior CVA s/p trach/PEG (decannulated prior to admission, ILR in place) who was initially admitted on 8/11/23 after p/w with acute hypoxemic and hypercapnic respiratory failure thought to be 2/2 Aspiration PNA vs ADHF/flash pulmonary edema in setting of HTN emergency. Patient required intubation and mechanical ventilation intubation, and was admitted to MICU for lengthy period of time.     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     Hospital course also marked by recurrent infections, including MSSA bacteremia (s/p Vanc due to possible cefepime vs. cefazolin drug-induced rash) and MSSA/ESBL EColi in BAL. Also found to hve L otitis externa (9/5) s/p ENT debridement c/b concern for superimposed candida infection (s/p Meropenem, Fluconazole). Most recently w/ Proteus/Pseudomonas in sputum now s/p Aztreonam. Also further found to have L popliteal DVT.    Patient transferred back to MICU for trial of HD, possibly requiring IV vasopressors. Has had persistent need for vasopressors during HD and also on high doses of oral vasopressors.     11/16: worsening hypoxemia with turns, cxr with worsening R sided opacities    # CVA  # Acute on chronic hypoxemic respiratory failure  # Melena  # ESRD on HD  # functional quadriplegia  # Shock on pressors during HD.  # DVT  - Persistent encephelopathy 2/2 to multiple strokes  - Wean vasopressors to maintain MAP > 65, currently needing them during HD sessions. plan for HD today, vasopressors prn, insert shiley  - C/W Vent, PS trial as tolerated, adjust based on blood gas; bronch with some secretions, clearing now, no need for thoracentesis; unclear if bronch specimen was received so will do tracheal aspirate today  - S/P ENT and abx for OE, now off. Currently monitoring off all abx.   - C/W PPI, H/H stable, monitor for continued melena. A/C on hold. tolerating asa.    - DVT, was on A/C but held 2/2 to melena. hsq for now  - HD as per renal, continuing HD with pressors. Patient unlikely to tolerated long term HD. SHiley removed 11/15 for line holiday, will reinsert today  - c/w tube feeds  - DVT ppx - none 2/2 to DVT and GIB  - Dispo- Per family is full code. Dismal prognosis over all given multiple strokes. Poor functional status and pressors dependency. Multiple GOC done by multiple providers. Family is still very hopeful for recovery. Will continue to have on going GOC.
Critically ill patient requiring frequent bedside visits with therapy changes.
Critically ill patient requiring frequent bedside visits with therapy changes.
73 F with CKD5, wheel chair bound, prior CVA here with prolonged hospital course now trach, recurrent infections, now dialysis dependent here with shock of unclear etiology    HD catheter removed and leukocytosis improving  off eliquis, on heparin gtt  on full vent support, PS trials as tolerated  completing aztreonam for pseudomonas pneumonia  needs vasopressors for HD, will give extra dose of droxidopa on HD days    Critically ill patient requiring frequent bedside visits with therapy changes.
Critically ill patient requiring frequent bedside visits with therapy changes.
Pt is a 73F with PMHx IDDMII, CKD Stage IV, CHFpEF, latent TB (s/p tx,) hx breast cancer (2018, s/p mastectomy and adjuvant CT/RT), and hx CVA s/p trach/PEG (now decannulated) with residual hemiparesis initially presenting to [facility] on 8/11/23 with acute hypoxemic and hypercapnic respiratory failure s/p ETT intubation/MV and ARF with pulmonary edema c/b HTN emergency requiring nicardipine drip transferred to MICU for further management with failure to wean from ventilator s/p tracheostomy placement and RCU transfer for further management followed by recurrent ARF and shock requiring transfer back to MICU 9/26.    Pt initially presenting to hospital with worsening RUE shaking and dysconjugate gaze with MRI 8/17 (+) new right cerebellar, midbrain, and multiple tiny embolic infarcts as well as known left PCA CVA. EEG (-). STEPHANIE (-) on 8/17 but with evidence of 2 linear mobile echogenic elements on AV leaflets likely 2/2 Lambel's excrescence but with no TRINITY thrombus. ILR in place from prior CVA evaluation, last interrogated 9/7 without AF. At baseline, pt reportedly wheelchair bound with RUE tremors and some ADL assistance. Pt was unable to tolerate ASA 2/2 GIB, will re-attempt once more stable. Worsening mentation x2 weeks possibly 2/2 metabolic/infectious encephalopathy in the setting of hyponatremia, uremia, and recurrent infection. No new focal deficits noted. Pt intermittently awakens but only for very short periods of time. Repeat CT Head performed 2/2 persistent unresponsiveness with concern for possible new left parietal infarct vs. artifact. Discussed with pt's neurologist, no evidence of new stroke.     Pt with acute hypoxemic and hypercapnic respiratory failure s/p ETT intubation/MV possibly 2/2 flash pulmonary edema in the setting of HTN emergency +/- aspiration event. Pt with failure of ventilatory weaning now s/p tracheostomy placement (IP 9/1 Jagrutiley #6 cuffed exchanged for distal XLT 9/27). Pt s/p bedside bronchoscopy x2 for poor TVs and high airway pressures, found to have (+) intermittent severe dynamic airway collapse into distal end of tracheostomy extending to main ines and RM bronchus with some improvement following trach exchange. O2 requirements decreasing today. Of note, pt also noted to have evidence of midbrain CVA with possible concern for central effect on respiratory drive. Airway clearance therapy in place, Wean O2 supplementation for goal O2 saturation 90-95%. Oral hygiene and aspiration precautions in place. Trach care and suctioning as per RCU team.      Pt p/w ARF on CKD requiring initiation of HD 2/2 pulmonary edema and metabolic acidosis with metabolic encephalopathy in MICU. Was off HD since 9/20. Pt initially with (+) UOP on diuretics but now in the setting of worsening shock state, pt with new oliguria as well as progressive anasarca and pulmonary edema. Pt underwent repeat HD 9/27 with ~1.3L fluid removed. Tolerated well. Bedside POCUS shows improved b-lines with smaller b/l simple PLEFS, c/w improving pulmonary edema.. Plan for repeat HD session today. Will c/w stress-dose steroids. Pressors held since overnight, remains on midodrine 20 q8hr. Bedside POCUS with normal cardiac fxn, no evidence of RV overload.    Hospital course further c/b recurrent infections, most recently with MSSA bacteremia (9/1, cleared 9/4 and 9/8) with (+) MSSA and ESBL EColi in BAL as well. Pt with possible cefepime vs. cefazolin drug-induced rash followed by transition to vancomycin but then with recurrence of rash transitioned to daptomycin (9/26.) Pt a/w left otitis externa (9/5) s/p serial bedside debridement with worsening necrotic tissue/erythema for which she was also initiated on meropenem. Pt underwent ENT debridement x2 (last 9/21) found to have new black spores and white discoloration with concern for superimposed candidal infection. Now with uveitis vs. endophthalmitis, appreciate opthalmology input. Given fevers and need for debridement fluconazole added to regimen 9/21. Meropenem completed, CT imaging did not show significant enough bone involvement to require prolonged course for osteomyelitis. Given worsening shock state, pan CT scan performed overnight with no other sources of infection noted. Will c/w fluconazole + daptomycin for now with close monitoring.    Pt also with oropharyngeal dysphagia requiring NGTs followed by UGIB s/p EGD (8/31) found to have esophagitis/erosive gastropathy now on PPI BID. Pt further found to have popliteal DVT. No c/i by GI for A/C, pt initiated on heparin drip with patient-specific pTTs.    Dispo pending medical optimization. Pt full code. [facility] readdressed with pt's family (daughter and  at bedside) who are hopeful for clinical improvement.
72 yo F (wheelchair bound prior to admission) w/ HFpEF, T2DM, CKD5, latent TB (s/p tx,) hx breast ca (2018, s/p mastectomy and adjuvant CT/RT), and hx prior CVA s/p trach/PEG (decannulated prior to admission, ILR in place) who was initially admitted on 8/11/23 after p/w with acute hypoxemic and hypercapnic respiratory failure thought to be 2/2 Aspiration PNA vs ADHF/flash pulmonary edema in setting of HTN emergency. Patient required intubation and mechanical ventilation intubation, and was admitted to MICU for lengthy period of time.     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     Hospital course also marked by recurrent infections, including MSSA bacteremia (s/p Vanc due to possible cefepime vs. cefazolin drug-induced rash) and MSSA/ESBL EColi in BAL. Also found to hve L otitis externa (9/5) s/p ENT debridement c/b concern for superimposed candida infection (s/p Meropenem, Fluconazole). Most recently w/ Proteus/Pseudomonas in sputum now s/p Aztreonam. Also further found to have L popliteal DVT.    Patient transferred back to MICU for trial of HD, possibly requiring IV vasopressors. Has had persistent need for vasopressors during HD and also on high doses of oral vasopressors.     # CVA  # Acute on chronic hypoxemic respiratory failure  # Ostitis externa  # Melena  # ESRD on HD  # functional quadriplegia  # Shock on pressors during HD.  # DVT  - Persistent encephelopathy 2/2 to multiple strokes  - Wean vasopressors to maintain MAP > 65, currently needing them during HD sessions. s/p HD today and now off pressors  - C/W Vent, PS trial as tolerated, adjust based on blood gas  - S/P ENT and abx for OE, now off. Currently monitoring off all abx.   - C/W PPI, H/H stable, monitor for continued melena. A/C on hold. tolerating asa.    - DVT, was on A/C but held 2/2 to melena and permacath. Will restart a/c when able.  - HD as per renal, continuing HD with pressors. Patient unlikely to tolerated long term HD. SHiley removed today for line holiday, will reinsert in coming days.  - c/w tube feeds  - DVT ppx - none 2/2 to DVT and GIB  - Dispo- Per family is full code. Dismal prognosis over all given multiple strokes. Poor functional status and pressors dependency. Multiple GOC done by multiple providers. Family is still very hopeful for recovery. Will continue to have on going GOC.
Critically ill patient requiring frequent bedside visits with therapy changes.
Patient is a 74 yo F (wheelchair bound prior to admission) w/ HFpEF, T2DM, CKD5, latent TB (s/p tx,) hx breast ca (2018, s/p mastectomy and adjuvant CT/RT), and hx prior CVA s/p trach/PEG (decannulated prior to admission, ILR in place) who was initially admitted on 8/11/23 after p/w with acute hypoxemic and hypercapnic respiratory failure thought to be 2/2 Aspiration PNA vs ADHF/flash pulmonary edema in setting of HTN emergency. Patient required intubation and mechanical ventilation intubation, and was admitted to MICU for lengthy period of time.     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     Hospital course also marked by recurrent infections, including MSSA bacteremia (s/p Vanc due to possible cefepime vs. cefazolin drug-induced rash) and MSSA/ESBL EColi in BAL. Also found to hve L otitis externa (9/5) s/p ENT debridement c/b concern for superimposed candida infection (s/p Meropenem, Fluconazole). Most recently w/ Proteus/Pseudomonas in sputum now s/p Aztreonam. Also further found to have L popliteal DVT.    Patient transferred back to MICU for trial of HD, possibly requiring IV vasopressors. Has had persistent need for vasopressors during HD and also on high doses of oral vasopressors.     # CVA  # Acute on chronic hypoxemic respiratory failure  # Ostitis externa  # Melena  # ESRD on HD  # functional quadriplegia  # Shock on pressors during HD.  # DVT  - Persistent encephelopathy 2/2 to multiple strokes  - Wean vasopressors to maintain MAP > 65, currently needing them during HD sessions.   - C/W Vent, PS trial as tolerated, adjust based on blood gas  - S/P ENT and abx for OE, now off. Currently monitoring off all abx.   - C/W PPI, H/H stable, monitor for continued melena. A/C on hold. Will start a trial of ASA today.   - DVT, was on A/C but held 2/2 to melena and permacath. Will restart a/c when able.  - HD as per renal, continuing HD with pressors. Patient unlikely to tolerated long term HD.   - c/w tube feeds  - DVT ppx - none 2/2 to DVT and GIB  - Dispo- Per family is full code. Dismal prognosis over all given multiple strokes. Poor functional status and pressors dependency. Multiple GOC done by multiple providers. Family is still very hopeful for recovery. Will continue to have on going GOC.
Critically ill patient requiring frequent bedside visits with therapy changes.
Critically ill patient requiring frequent bedside visits with therapy changes.
Medical management as above, review of results/records, discussion with patient and primary team, documentation.

## 2023-11-29 NOTE — PROGRESS NOTE ADULT - PROBLEM SELECTOR PROBLEM 5
Acute deep vein thrombosis

## 2023-11-29 NOTE — PROGRESS NOTE ADULT - PROBLEM/PLAN-1
DISPLAY PLAN FREE TEXT

## 2023-11-29 NOTE — PROGRESS NOTE ADULT - SUBJECTIVE AND OBJECTIVE BOX
Follow Up:      Inverval History/ROS:Patient is a 73y old  Female who presents with a chief complaint of Respiratory distress (29 Nov 2023 10:35)    Worsening acidemia  On pressors      Allergies    isoniazid (Rash)  nafcillin (Unknown)  hydrALAZINE (Rash)  vitamin E (Short breath; Urticaria; Hives)  doxycycline (Rash)  cefepime (Rash)  NIFEdipine (Urticaria; Hives)  Zosyn (Rash)    Intolerances        ANTIMICROBIALS:  metroNIDAZOLE  IVPB 500 every 8 hours  polymyxin B IVPB [id number] every 12 hours      OTHER MEDS:  acetaminophen   Oral Liquid .. 650 milliGRAM(s) Oral every 6 hours PRN  albuterol/ipratropium for Nebulization 3 milliLiter(s) Nebulizer every 6 hours  artificial  tears Solution 1 Drop(s) Both EYES every 4 hours  aspirin  chewable 81 milliGRAM(s) Enteral Tube daily  atorvastatin 10 milliGRAM(s) Oral at bedtime  calamine/zinc oxide Lotion 1 Application(s) Topical daily  chlorhexidine 0.12% Liquid 15 milliLiter(s) Oral Mucosa every 12 hours  chlorhexidine 2% Cloths 1 Application(s) Topical daily  chlorhexidine 4% Liquid 1 Application(s) Topical <User Schedule>  dexMEDEtomidine Infusion 0.2 MICROgram(s)/kG/Hr IV Continuous <Continuous>  dextrose 5%. 1000 milliLiter(s) IV Continuous <Continuous>  dextrose 5%. 1000 milliLiter(s) IV Continuous <Continuous>  dextrose 50% Injectable 12.5 Gram(s) IV Push once  dextrose 50% Injectable 25 Gram(s) IV Push once  dextrose 50% Injectable 25 Gram(s) IV Push once  dextrose 50% Injectable 25 Gram(s) IV Push once  dextrose Oral Gel 15 Gram(s) Oral once PRN  diphenhydrAMINE Elixir 50 milliGRAM(s) Oral once PRN  droxidopa 600 milliGRAM(s) Oral <User Schedule> PRN  droxidopa 600 milliGRAM(s) Oral every 8 hours  fentaNYL   Infusion. 1 MICROgram(s)/kG/Hr IV Continuous <Continuous>  ferrous    sulfate Liquid 300 milliGRAM(s) Oral daily  glucagon  Injectable 1 milliGRAM(s) IntraMuscular once  heparin   Injectable 5000 Unit(s) SubCutaneous every 8 hours  insulin lispro (ADMELOG) corrective regimen sliding scale   SubCutaneous every 6 hours  insulin NPH human recombinant 6 Unit(s) SubCutaneous every 6 hours  midodrine. 40 milliGRAM(s) Oral <User Schedule>  midodrine. 40 milliGRAM(s) Oral once PRN  pantoprazole  Injectable 40 milliGRAM(s) IV Push two times a day  petrolatum Ophthalmic Ointment 1 Application(s) Both EYES four times a day  propofol Infusion 5 MICROgram(s)/kG/Min IV Continuous <Continuous>  sodium chloride 1 Gram(s) Oral two times a day  sodium chloride 0.9% Bolus. 250 milliLiter(s) IV Bolus every 5 minutes PRN  sodium chloride 0.9% lock flush 10 milliLiter(s) IV Push every 1 hour PRN  sodium chloride 3%  Inhalation 4 milliLiter(s) Inhalation every 6 hours      Vital Signs Last 24 Hrs  T(C): 35.6 (29 Nov 2023 12:00), Max: 37 (29 Nov 2023 00:00)  T(F): 96 (29 Nov 2023 12:00), Max: 98.6 (29 Nov 2023 00:00)  HR: 131 (29 Nov 2023 13:00) (68 - 131)  BP: --  BP(mean): --  RR: 30 (29 Nov 2023 13:00) (0 - 30)  SpO2: 100% (29 Nov 2023 13:00) (84% - 100%)    Parameters below as of 29 Nov 2023 09:41  Patient On (Oxygen Delivery Method): ventilator        PHYSICAL EXAM:  General: [ x] trach  HEAD/EYES: [ ] PERRL [ x] white sclera [ ] icterus  ENT:  [ ] normal [ ] supple [ ] thrush [ ] pharyngeal exudate  Cardiovascular:   [ ] murmur [x ] normal [ ] PPM/AICD  Respiratory:  x[ ] clear to ausculation bilaterally  GI:  [ x] soft, non-tender, normal bowel sounds  :  [ ] willard [ ] no CVA tenderness   Musculoskeletal:  [ ] no synovitis  Neurologic:  [ ] non-focal exam   Skin:  [x ] no rash  Lymph: [x ] no lymphadenopathy  Psychiatric:  [ ] appropriate affect [ ] alert & oriented  Lines:  [x ] no phlebitis [ ] central line                                6.3    40.06 )-----------( 411      ( 29 Nov 2023 00:30 )             22.4       11-29    141  |  109<H>  |  21  ----------------------------<  158<H>  3.1<L>   |  22  |  1.02    Ca    6.4<LL>      29 Nov 2023 00:30  Phos  2.9     11-29  Mg     1.60     11-29    TPro  4.3<L>  /  Alb  1.0<L>  /  TBili  <0.2  /  DBili  x   /  AST  14  /  ALT  12  /  AlkPhos  218<H>  11-29      Urinalysis Basic - ( 29 Nov 2023 00:30 )    Color: x / Appearance: x / SG: x / pH: x  Gluc: 158 mg/dL / Ketone: x  / Bili: x / Urobili: x   Blood: x / Protein: x / Nitrite: x   Leuk Esterase: x / RBC: x / WBC x   Sq Epi: x / Non Sq Epi: x / Bacteria: x        MICROBIOLOGY:Culture Results:   No growth at 24 hours (11-28-23 @ 03:20)  Culture Results:   No growth (11-27-23 @ 21:25)  Culture Results:   Testing in progress (11-27-23 @ 21:25)  Culture Results:   No growth at 4 days (11-24-23 @ 18:30)      RADIOLOGY:

## 2023-11-29 NOTE — PROGRESS NOTE ADULT - ASSESSMENT
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       NEUROLOGY  #AMS  # Multiple embolic strokes   # L PCA Infarct   MR head performed w/ new infarct and old infarcts, EEG without seizure events but with high foci potential   - EEG given facial twitching: negative for epileptiform activity (10/31)  - deferring repeat MRI as unlikely to   - baseline AOX3 at home per daughter, trached/non-verbal   - c/w ASA 81 mg   - s/p Nimbex 11/27 2/2 worsening respiratory status    CARDIOVASCULAR  # Septic vs vasoplegic shock   Etiology likely septic iso active infection. Possible component of vasoplegic, however no longer with active infection or on sedation. Off IV vasopressors.   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   - resolved    RESPIRATORY  # acute hypoxic resp failure second to volume overload, ventilator dependence   # Copious inline and oral thick tanned secretions   #Pleural effusion  - CKD vs HFpEF w/ hypercarbia 2/2 volume overload requiring intubation, trach, and HD initiation  - Continue on albuterol and chest PT, cont 3 % INH, IPV  - Continue volume removal with HD  - hypoxemia w/ turning and repositioning, CXR 11/16 shows moderate right pleural effusion, worsening RUL atelectasis, left has small effusion and/or atelectasis  - Bronchoscopy bedside 11/16 noted with white secretions right > left  - Chest tube placed 11/27 2/2 pleural effusion and respiratory status, now with bloody output     #tracheomalacia   - CT CHEST (9/8) with tracheomalacia, BL pleural effusions and persistent consolidations     GI  # GIB: last pRBC transfused 11/4 (10/14 before that)   - Continue on PPI BID  - patient had melena before 11/13 but H/H stable,  Eliquis was held for dark brown/black stool  - as per nursing staff, stool is black in the rectal system    # Dysphagia   - NGT- tolerating feeds, at goal- changed to nepro per nutrition    RENAL  # ESRD, L ARTHUR taylor   - HD MWF TIW with midodrine and droxidopa   - ICU for vasopressor assisted volume removal, cap to 1 pressor and not offering CRRT due to the comorbidities and prognosis   - f/u w/ nephrology: will continue to offer patient 1-pressor assisted HD as agreed prior, as long as she can tolerate HD maxed on 1 pressor, Levo to 0.3; she will be considered iHD candidate  - IR was planning PermCath 11/14; but procedure was cancelled due to pt in MICU, on vasopressors, and has leucocytosis  - [facility] ashlee discontinued 11/16 for line holiday, trialysis catheter placed in [facility] on 11/17 however was removed 11/18 iso poor flow rates during HD.  - Per IR, patient is not a candidate for a tunneled HD catheter at this time given uptrending leukocytosis  - Ashlee placed 11/22 and viable, last HD 11/28 however not tolerated due to BP    INFECTIOUS DISEASE  #Septic shock  , afebrile,  WBC - 15>27, LA wnls  #blood cx 9/1: MSSA with hypotension  repeat negative 9/4, 9/8 s/p 4 weeks of vanco/dapto through 10/2  #Colonized with  pseudomonas   # MSSA/ESBL E Coli PNA  # L otitis Externa s/p ENT debridement c/b Candida, s/p Meropenem and Fluconazole  # Proteus/ Pseudomonas in sputum, s/p Aztreonam   - recent Bcx 11/2 negative, sputum 11/2 with  pseudomonas- colonized   - BCx 11/24 NGTD  - f/u BAL (11/16)- only afb was sent which is negative.   - c/w Vanco, Polymyxin, Flagyl (11/27 - )  - f/u BCx and pleural fluid cx 11/28    # possible malignant otitis externa   # ESBL ECOLI and MSSA VAP c/b MSSA bacteremia   - hx of MSSA bacteremia, ESBL E. Coli sputum Cx, BAL w/ MSSA  - treated with abx   - eosinophilia workup: JORGE, ANCAs negative    HEME  # Anemia second to GIB vs renal disease   - multiple transfusions, last done 11/4  - Eliquis held 2/2 pt having melena, and now pt with black stools/dark brown  - c/w ASA 81mg  - c/w Heparin SQ     #Allergic transfusion reaction: per [facility] documentation, developed erythematous rash across chest shortly after starting transfusion, blood bank consulted and diagnosed mild allergic rxn  - premedicate w/ benadryl and famotidine prior to future transfusions  - no issue w/ pRBC transfusion 11/4    VASCULAR   # LLE POP DVT   - on Eliquis (held as of 11/11 PM for procedure and also patient having melena), stools are black/dark brown  - restarted Heparin SQ prophylaxis 11/14 and will continue to hold off starting Eliquis given melena.  - SCDs    # HD access  - TRISHA TAYLOR (9/27 - 10/21), replaced 10/24 -11/15  - TRISHA taylor discontinued 11/16 for line holiday, trialysis catheter placed in [facility] on 11/17 however was removed 11/18 iso poor flow rates during HD.  - Shiley placed 11/22, viable for HD  -patient deemed not a candidate for a tunneled HD catheter given uptrending leukocytosis    ONC   # Hx of Breast CA   - Patient dx in 2018 and s/p B/L mastectomy (radical on right) and chemo and RT.     ENDOCRINE  # IDDM2   - Continue on NPH 6U with moderate ISS   - Adjust as needed    SKIN  #Rash  - Small vesicular rash to abdomen which is old and improved, will CTM    ETHICS/ GOC    - Pt made DNR/intubate as of 11/29  - Family continues to want everything done medically despite inability to tolerate HD on multiple oral pressor

## 2023-11-29 NOTE — PROGRESS NOTE ADULT - PROBLEM SELECTOR PLAN 4
[place] emesis  two episodes of melena  s/p EGD - no active bleeding  Transfuse PRBC hgb > 8   GI follow up
[place] emesis  two episodes of melena  now with recurrent drop in Hgb   s/p EGD   Transfuse PRBC hgb > 8   GI follow up
[place] emesis  two episodes of melena  now with recurrent drop in Hgb   s/p EGD   Transfuse PRBC hgb > 8   GI follow up
[place] emesis  two episodes of melena  s/p EGD - no active bleeding  Transfuse PRBC hgb > 8   GI follow up
Monitor Hgb   PRBC transfusion   plan for GED   acceptable cardiac risk to proceed
[place] emesis  two episodes of melena  s/p EGD - no active bleeding  Transfuse PRBC hgb > 8   GI follow up
[place] emesis  two episodes of melena  now with recurrent drop in Hgb   s/p EGD   Transfuse PRBC hgb > 8   GI follow up
[place] emesis  two episodes of melena  s/p EGD - no active bleeding  Transfuse PRBC hgb > 8   GI follow up
[place] emesis  two episodes of melena  now with recurrent drop in Hgb   s/p EGD   Transfuse PRBC hgb > 8   GI follow up
[place] emesis  two episodes of melena  s/p EGD - no active bleeding  Transfuse PRBC hgb > 8   GI follow up
[place] emesis  two episodes of melena  s/p EGD - no active bleeding, poor prep  GI follow up
[place] emesis  two episodes of melena  s/p EGD - no active bleeding  Transfuse PRBC hgb > 8   GI follow up
[place] emesis  two episodes of melena  s/p EGD - no active bleeding, poor prep  GI follow up
[place]offee [place] emesis  two episodes of melena  now with recurrent drop in Hgb   s/p EGD   Transfuse PRBC hgb > 8   GI follow up
[place] emesis  two episodes of melena  s/p EGD - no active bleeding  Transfuse PRBC hgb > 8   GI follow up
[place]offee [place] emesis  two episodes of melena  now with recurrent drop in Hgb   s/p EGD   Transfuse PRBC hgb > 8   GI follow up
[place]offee [place] emesis  two episodes of melena  s/p EGD - no active bleeding  Transfuse PRBC hgb > 8   GI follow up
[place]offee [place] emesis  two episodes of melena  now with recurrent drop in Hgb   s/p EGD   Transfuse PRBC hgb > 8   GI follow up
[place]offee [place] emesis  two episodes of melena  s/p EGD - no active bleeding  Transfuse PRBC hgb > 8   GI follow up
[place]offee [place] emesis  two episodes of melena  now with recurrent drop in Hgb   s/p EGD   Transfuse PRBC hgb > 8   GI follow up
[place]offee [place] emesis  two episodes of melena  now with recurrent drop in Hgb   s/p EGD   Transfuse PRBC hgb > 8   GI follow up
[place]offee [place] emesis  two episodes of melena  s/p EGD - no active bleeding  Transfuse PRBC hgb > 8   GI follow up
[place]offee [place] emesis  two episodes of melena  s/p EGD - no active bleeding, poor prep  GI follow up
coffee [place] emesis  two episodes of melena  now with recurrent drop in Hgb   s/p EGD   Transfuse PRBC hgb > 8   GI follow up
coffee [place] emesis  two episodes of melena  now with recurrent drop in Hgb   s/p EGD   Transfuse PRBC hgb > 8   GI follow up
coffee ground emesis  two episodes of melena  s/p EGD - no active bleeding  Transfuse PRBC hgb > 8   GI follow up

## 2023-11-29 NOTE — PROGRESS NOTE ADULT - PROBLEM SELECTOR PROBLEM 3
Acute kidney injury superimposed on CKD
Mastoiditis
Acute kidney injury superimposed on CKD
Mastoiditis
Acute kidney injury superimposed on CKD
Mastoiditis
Acute kidney injury superimposed on CKD
Mastoiditis
Acute kidney injury superimposed on CKD

## 2023-11-29 NOTE — PROGRESS NOTE ADULT - PROBLEM SELECTOR PROBLEM 4
GI bleed

## 2023-11-29 NOTE — PROGRESS NOTE ADULT - PROBLEM SELECTOR PLAN 2
RISS
- FSG control - FSG goal <150  - Care per primary team
RISS
- FSG control - FSG goal <150  - Rest of care per primary team.
FSG control - FSG goal <150  - Rest of care per primary team.
RISS
- FSG control - FSG goal <150  - Rest of care per primary team
RISS
- FSG control - FSG goal <150  - Rest of care per primary team
RISS
- FSG control - FSG goal <150  - Rest of care per primary team.
RISS

## 2023-11-29 NOTE — PROGRESS NOTE ADULT - CRITICAL CARE SERVICES PROVIDED
Patient is critically ill, requiring critical care services.

## 2023-11-29 NOTE — PROGRESS NOTE ADULT - SUBJECTIVE AND OBJECTIVE BOX
NEPHROLOGY     Patient seen and examined with family at bedside, pt trach to vent, hypotensive on levo gtt, HD yesterday removed 0.7kg.     MEDICATIONS  (STANDING):  albuterol/ipratropium for Nebulization 3 milliLiter(s) Nebulizer every 6 hours  artificial  tears Solution 1 Drop(s) Both EYES every 4 hours  aspirin  chewable 81 milliGRAM(s) Enteral Tube daily  atorvastatin 10 milliGRAM(s) Oral at bedtime  calamine/zinc oxide Lotion 1 Application(s) Topical daily  chlorhexidine 0.12% Liquid 15 milliLiter(s) Oral Mucosa every 12 hours  chlorhexidine 2% Cloths 1 Application(s) Topical daily  chlorhexidine 4% Liquid 1 Application(s) Topical <User Schedule>  dexMEDEtomidine Infusion 0.2 MICROgram(s)/kG/Hr (3.33 mL/Hr) IV Continuous <Continuous>  dextrose 5%. 1000 milliLiter(s) (50 mL/Hr) IV Continuous <Continuous>  dextrose 5%. 1000 milliLiter(s) (100 mL/Hr) IV Continuous <Continuous>  dextrose 50% Injectable 12.5 Gram(s) IV Push once  dextrose 50% Injectable 25 Gram(s) IV Push once  dextrose 50% Injectable 25 Gram(s) IV Push once  dextrose 50% Injectable 25 Gram(s) IV Push once  droxidopa 600 milliGRAM(s) Oral every 8 hours  fentaNYL   Infusion. 1 MICROgram(s)/kG/Hr (6.65 mL/Hr) IV Continuous <Continuous>  ferrous    sulfate Liquid 300 milliGRAM(s) Oral daily  glucagon  Injectable 1 milliGRAM(s) IntraMuscular once  heparin   Injectable 5000 Unit(s) SubCutaneous every 8 hours  insulin lispro (ADMELOG) corrective regimen sliding scale   SubCutaneous every 6 hours  insulin NPH human recombinant 6 Unit(s) SubCutaneous every 6 hours  metroNIDAZOLE  IVPB 500 milliGRAM(s) IV Intermittent every 8 hours  midodrine. 40 milliGRAM(s) Oral <User Schedule>  norepinephrine Infusion 0.05 MICROgram(s)/kG/Min (6.23 mL/Hr) IV Continuous <Continuous>  pantoprazole  Injectable 40 milliGRAM(s) IV Push two times a day  petrolatum Ophthalmic Ointment 1 Application(s) Both EYES four times a day  polymyxin B IVPB [id number] Unit(s) IV Intermittent every 12 hours  propofol Infusion 5 MICROgram(s)/kG/Min (2 mL/Hr) IV Continuous <Continuous>  sodium chloride 1 Gram(s) Oral two times a day  sodium chloride 3%  Inhalation 4 milliLiter(s) Inhalation every 6 hours    VITALS:  T(C): , Max: 37 (11-29-23 @ 00:00)  T(F): , Max: 98.6 (11-29-23 @ 00:00)  HR: 101 (11-29-23 @ 10:00)  BP: --  RR: 15 (11-29-23 @ 10:00)  SpO2: 99% (11-29-23 @ 10:00)    PHYSICAL EXAM:  Constitutional: critically ill, trach to vent   HEENT: + NGT, + tracheostomy   Respiratory: coarse BS  Cardiovascular: tachy s1s2  Gastrointestinal: BS+, soft, NT/ND  Extremities: ++ generalized edema  : No Wheeler  Skin: No rashes  Access: [facility]     LABS:                        6.3    40.06 )-----------( 411      ( 29 Nov 2023 00:30 )             22.4     11-29    141  |  109<H>  |  21  ----------------------------<  158<H>  3.1<L>   |  22  |  1.02    Ca    6.4<LL>      29 Nov 2023 00:30  Phos  2.9     11-29  Mg     1.60     11-29    TPro  4.3<L>  /  Alb  1.0<L>  /  TBili  <0.2  /  DBili  x   /  AST  14  /  ALT  12  /  AlkPhos  218<H>  11-29    RADIOLOGY & ADDITIONAL STUDIES:

## 2023-11-29 NOTE — PROGRESS NOTE ADULT - PROBLEM SELECTOR PROBLEM 1
Acute respiratory failure with hypoxia and hypercapnia
Diffuse otitis externa, left ear
Discussed importance of advanced care planning. Patient has advanced medical directive at home and will bring it to be reviewed and scanned into chart.    Prabhjot POOLE
Otitis externa, tropical
Acute respiratory failure with hypoxia and hypercapnia
Diffuse otitis externa, left ear
Acute respiratory failure with hypoxia and hypercapnia
Otitis externa, tropical
Acute respiratory failure with hypoxia and hypercapnia
Otitis externa, tropical
Acute respiratory failure with hypoxia and hypercapnia
Diffuse otitis externa, left ear
Diffuse otitis externa, left ear
Acute respiratory failure with hypoxia and hypercapnia
Otitis externa, tropical
Acute respiratory failure with hypoxia and hypercapnia
Diffuse otitis externa, left ear
Otitis externa, tropical
Acute respiratory failure with hypoxia and hypercapnia
Diffuse otitis externa, left ear
Acute respiratory failure with hypoxia and hypercapnia
Diffuse otitis externa, left ear
Acute respiratory failure with hypoxia and hypercapnia

## 2023-11-29 NOTE — PROGRESS NOTE ADULT - SUBJECTIVE AND OBJECTIVE BOX
INTERVAL HPI/OVERNIGHT EVENTS: Overnight with worsening MAPs despite levophed 0.7mcg. Pt made DNR/intubate    SUBJECTIVE: Patient seen and examined at bedside. Mentation unchanged.    ROS: All negative except as listed above.    VITAL SIGNS:  ICU Vital Signs Last 24 Hrs  T(C): 35.9 (29 Nov 2023 08:00), Max: 37 (28 Nov 2023 10:30)  T(F): 96.6 (29 Nov 2023 08:00), Max: 98.6 (28 Nov 2023 10:30)  HR: 101 (29 Nov 2023 10:00) (74 - 127)  BP: --  BP(mean): --  ABP: 71/22 (29 Nov 2023 10:00) (51/16 - 135/42)  ABP(mean): 39 (29 Nov 2023 10:00) (27 - 76)  RR: 15 (29 Nov 2023 10:00) (0 - 29)  SpO2: 99% (29 Nov 2023 10:00) (84% - 100%)    O2 Parameters below as of 29 Nov 2023 09:41  Patient On (Oxygen Delivery Method): ventilator          Mode: AC/ CMV (Assist Control/ Continuous Mandatory Ventilation), RR (machine): 29, FiO2: 60, PEEP: 5, ITime: 0.8, MAP: 20, PC: 38, PIP: 43  Plateau pressure:   P/F ratio:     11-28 @ 07:01  -  11-29 @ 07:00  --------------------------------------------------------  IN: 3638.8 mL / OUT: 1165 mL / NET: 2473.8 mL    11-29 @ 07:01 - 11-29 @ 10:28  --------------------------------------------------------  IN: 848 mL / OUT: 0 mL / NET: 848 mL      CAPILLARY BLOOD GLUCOSE      POCT Blood Glucose.: 202 mg/dL (29 Nov 2023 06:29)      ECG: reviewed.    PHYSICAL EXAM:    CONSTITUTIONAL: Trached, critically ill    RESPIRATORY: +vent and coarse breath sounds auscultated. Chest tube in place with bloody output  CARDIOVASCULAR: Tachycardic and regular rhythm, normal S1 and S2, no murmur/rub/gallop;  Peripheral pulses are 2+ bilaterally  ABDOMEN: +NGT. distended. Nontender to palpation, normoactive bowel sounds, no rebound/guarding; No hepatosplenomegaly. Rectal tube w/ black output  MSK: no clubbing or cyanosis of digits  Extremities: Anasarca. 3+ BLE edema to knees, 1+ edema of bilateral thighs. 3+ edema of bilateral forearms  Neuro: Does not respond to verbal or physical stimuli. Does not follow commands    MEDICATIONS:  MEDICATIONS  (STANDING):  albuterol/ipratropium for Nebulization 3 milliLiter(s) Nebulizer every 6 hours  artificial  tears Solution 1 Drop(s) Both EYES every 4 hours  aspirin  chewable 81 milliGRAM(s) Enteral Tube daily  atorvastatin 10 milliGRAM(s) Oral at bedtime  calamine/zinc oxide Lotion 1 Application(s) Topical daily  chlorhexidine 0.12% Liquid 15 milliLiter(s) Oral Mucosa every 12 hours  chlorhexidine 2% Cloths 1 Application(s) Topical daily  chlorhexidine 4% Liquid 1 Application(s) Topical <User Schedule>  dexMEDEtomidine Infusion 0.2 MICROgram(s)/kG/Hr (3.33 mL/Hr) IV Continuous <Continuous>  dextrose 5%. 1000 milliLiter(s) (50 mL/Hr) IV Continuous <Continuous>  dextrose 5%. 1000 milliLiter(s) (100 mL/Hr) IV Continuous <Continuous>  dextrose 50% Injectable 12.5 Gram(s) IV Push once  dextrose 50% Injectable 25 Gram(s) IV Push once  dextrose 50% Injectable 25 Gram(s) IV Push once  dextrose 50% Injectable 25 Gram(s) IV Push once  droxidopa 600 milliGRAM(s) Oral every 8 hours  fentaNYL   Infusion. 1 MICROgram(s)/kG/Hr (6.65 mL/Hr) IV Continuous <Continuous>  ferrous    sulfate Liquid 300 milliGRAM(s) Oral daily  glucagon  Injectable 1 milliGRAM(s) IntraMuscular once  heparin   Injectable 5000 Unit(s) SubCutaneous every 8 hours  insulin lispro (ADMELOG) corrective regimen sliding scale   SubCutaneous every 6 hours  insulin NPH human recombinant 6 Unit(s) SubCutaneous every 6 hours  metroNIDAZOLE  IVPB 500 milliGRAM(s) IV Intermittent every 8 hours  midodrine. 40 milliGRAM(s) Oral <User Schedule>  norepinephrine Infusion 0.05 MICROgram(s)/kG/Min (6.23 mL/Hr) IV Continuous <Continuous>  pantoprazole  Injectable 40 milliGRAM(s) IV Push two times a day  petrolatum Ophthalmic Ointment 1 Application(s) Both EYES four times a day  polymyxin B IVPB [id number] Unit(s) IV Intermittent every 12 hours  propofol Infusion 5 MICROgram(s)/kG/Min (2 mL/Hr) IV Continuous <Continuous>  sodium chloride 1 Gram(s) Oral two times a day  sodium chloride 3%  Inhalation 4 milliLiter(s) Inhalation every 6 hours    MEDICATIONS  (PRN):  acetaminophen   Oral Liquid .. 650 milliGRAM(s) Oral every 6 hours PRN Temp greater or equal to 38C (100.4F), Mild Pain (1 - 3)  dextrose Oral Gel 15 Gram(s) Oral once PRN Blood Glucose LESS THAN 70 milliGRAM(s)/deciliter  diphenhydrAMINE Elixir 50 milliGRAM(s) Oral once PRN Rash and/or Itching  droxidopa 600 milliGRAM(s) Oral <User Schedule> PRN prior to hemodialysis  midodrine. 40 milliGRAM(s) Oral once PRN 1 Hour Pre HD  sodium chloride 0.9% Bolus. 250 milliLiter(s) IV Bolus every 5 minutes PRN SBP LESS THAN or EQUAL to 90 mmHg  sodium chloride 0.9% lock flush 10 milliLiter(s) IV Push every 1 hour PRN Pre/post blood products, medications, blood draw, and to maintain line patency      ALLERGIES:  Allergies    isoniazid (Rash)  nafcillin (Unknown)  hydrALAZINE (Rash)  vitamin E (Short breath; Urticaria; Hives)  doxycycline (Rash)  cefepime (Rash)  NIFEdipine (Urticaria; Hives)  Zosyn (Rash)    Intolerances        LABS:                        6.3    40.06 )-----------( 411      ( 29 Nov 2023 00:30 )             22.4     11-29    141  |  109<H>  |  21  ----------------------------<  158<H>  3.1<L>   |  22  |  1.02    Ca    6.4<LL>      29 Nov 2023 00:30  Phos  2.9     11-29  Mg     1.60     11-29    TPro  4.3<L>  /  Alb  1.0<L>  /  TBili  <0.2  /  DBili  x   /  AST  14  /  ALT  12  /  AlkPhos  218<H>  11-29    PT/INR - ( 27 Nov 2023 22:30 )   PT: 10.7 sec;   INR: 0.95 ratio         PTT - ( 27 Nov 2023 22:30 )  PTT:38.7 sec  Urinalysis Basic - ( 29 Nov 2023 00:30 )    Color: x / Appearance: x / SG: x / pH: x  Gluc: 158 mg/dL / Ketone: x  / Bili: x / Urobili: x   Blood: x / Protein: x / Nitrite: x   Leuk Esterase: x / RBC: x / WBC x   Sq Epi: x / Non Sq Epi: x / Bacteria: x      ABG:  pO2, Arterial: 58 mmHg (11-29-23 @ 00:30)  pH, Arterial: 6.99 (11-29-23 @ 00:30)  pCO2, Arterial: 94 mmHg (11-29-23 @ 00:30)  pH, Arterial: 7.14 (11-28-23 @ 14:38)  pCO2, Arterial: 83 mmHg (11-28-23 @ 14:38)  pO2, Arterial: 117 mmHg (11-28-23 @ 14:38)  pH, Arterial: 7.27 (11-28-23 @ 11:15)  pCO2, Arterial: 61 mmHg (11-28-23 @ 11:15)  pO2, Arterial: 171 mmHg (11-28-23 @ 11:15)      vBG:    Micro:    Culture - Blood (collected 11-28-23 @ 03:20)  Source: .Blood Blood-Peripheral  Preliminary Report (11-29-23 @ 10:01):    No growth at 24 hours    Culture - Blood (collected 11-24-23 @ 18:30)  Source: .Blood Blood-Venous  Preliminary Report (11-28-23 @ 22:01):    No growth at 4 days    Culture - Blood (collected 11-22-23 @ 09:30)  Source: .Blood Blood-Peripheral  Final Report (11-27-23 @ 17:01):    No growth at 5 days    Culture - Blood (collected 11-22-23 @ 09:00)  Source: .Blood Blood-Venous  Gram Stain (11-23-23 @ 22:30):    Growth in anaerobic bottle: Gram positive cocci in pairs  Final Report (11-24-23 @ 20:04):    Growth in anaerobic bottle: Staphylococcus epidermidis    Coagulase Negative Staphylococci isolated from a single blood culture set    may represent contamination.    Contact the Microbiology Department at [phone] if susceptibility    testing is clinically indicated.    Direct identification is available within approximately 3-5    hours either by Blood Panel Multiplexed PCR or Direct    MALDI-TOF. Details: [email or web address]  Organism: Blood Culture PCR (11-24-23 @ 20:04)  Organism: Blood Culture PCR (11-24-23 @ 20:04)      -  Staphylococcus epidermidis, Methicillin resistant: Detec      Method Type: PCR    Culture - Blood (collected 11-02-23 @ 09:10)  Source: .Blood Blood-Peripheral  Final Report (11-07-23 @ 12:01):    No growth at 5 days    Culture - Blood (collected 11-02-23 @ 09:00)  Source: .Blood Blood-Peripheral  Final Report (11-07-23 @ 12:01):    No growth at 5 days        Culture - Sputum (collected 11-02-23 @ 10:24)  Source: ET Tube ET Tube  Gram Stain (11-02-23 @ 15:31):    Moderate polymorphonuclear leukocytes per low power field    No Squamous epithelial cells per low power field    Moderate Gram Negative Rods per oil power field  Final Report (11-04-23 @ 22:56):    Numerous Pseudomonas aeruginosa (Carbapenem Resistant)  Organism: Pseudomonas aeruginosa (Carbapenem Resistant) (11-04-23 @ 22:56)  Organism: Pseudomonas aeruginosa (Carbapenem Resistant) (11-04-23 @ 22:56)      -  Resistance Gene to Carbapenem: Nondet      Method Type: Russell  Organism: Pseudomonas aeruginosa (Carbapenem Resistant) (11-04-23 @ 22:56)      -  Levofloxacin: R >4      -  Tobramycin: R >8      -  Ceftazidime/Avibactam: S <=4      -  Aztreonam: R >16      -  Gentamicin: R >8      -  Cefepime: I 16      -  Piperacillin/Tazobactam: R >64      -  Ciprofloxacin: R >2      -  Imipenem: R >8      Method Type: SIMON      -  Meropenem: R >8      -  Ceftazidime: I 16      -  Amikacin: S <=16      -  Ceftolozane/tazobactam: S <=2        RADIOLOGY & ADDITIONAL TESTS: Reviewed.

## 2023-11-29 NOTE — PROGRESS NOTE ADULT - ASSESSMENT
IMPRESSION: 73F w/ HTN, DM2, CVA, breast CA-bilateral mastectomy, recurrent aspiration pneumonia/respiratory failure, and CKD, 8/11/23 p/w acute hypercapnic respiratory failure; c/b RUSS    (1)Renal - RUSS on CKD4 ==>newly ESRD-HD as of this admission, s/p HD yesterday     (2)Hyponatremia - ESRD-associated - improved s/p HD     (3)CV- hypotensive - limited UF yesterday, remains on levo    (4)Pulm- trach/vent-dependent    (5)Anemia- on Epogen with HD    (6)GOC -made DNR/intubate as of 11/29 - though family persisting with interest in aggressive measures.     RECOMMEND:  (1)Would hold on further HD   (2)Pressors as needed per ICU team    D/w Dr. Isaiah Espinoza, [facility]  [phone]          IMPRESSION: 73F w/ HTN, DM2, CVA, breast CA-bilateral mastectomy, recurrent aspiration pneumonia/respiratory failure, and CKD, 8/11/23 p/w acute hypercapnic respiratory failure; c/b RUSS    (1)Renal - RUSS on CKD4 ==>newly ESRD-HD as of this admission, s/p HD yesterday     (2)Hyponatremia - ESRD-associated - improved s/p HD     (3)CV- hypotensive - limited UF yesterday, remains on levo    (4)Pulm- trach/vent-dependent    (5)Anemia- on Epogen with HD    (6)GOC -made DNR/intubate as of 11/29 - though family persisting with interest in aggressive measures.     RECOMMEND:  (1)Would hold on further HD   (2)Pressors as needed per ICU team    D/w Dr. Isaiah Espinoza, [facility]  [facility]  [phone]       RENAL ATTENDING NOTE  Patient seen and examined with NP. Agree with assessment and plan as above.    Jimmy Colon MD  [facility]  [phone]

## 2023-11-29 NOTE — PROGRESS NOTE ADULT - PROBLEM SELECTOR PROBLEM 2
DM (diabetes mellitus)
Diabetes mellitus
DM (diabetes mellitus)

## 2023-11-29 NOTE — PROGRESS NOTE ADULT - ATTENDING COMMENTS
72 yo F PMH T2DM, HTN, CKD now dialysis dependent, HFpEF, breast ca s/p bilateral mastectomy, chemotherapy, and radiation (2018), hx of cardiac arrest (2018), L pca occipital cva here with prolonged hospitalization c/b chronic respiratory failure, renal failure, MDR infections, and AMS. She is now in persistent shock requiring oral vasopressors with minimal improvement in mental status.    intermittently hypoxic and agitated requiring sedation  required chest tube for pleural effusion and worsening acute hypoxemic and hypercapnic respiratory failure   suspect new pseudomonas pneumonia  worsening hypercapnia and shock    # acute hypoxemic respiratory failure  # shock  # ESRD on HD  # pseudomonas pneumonia  - keep chest tube on suction; c/w broad abx  - c/w vasopressors for shock, patient very tachycardic though  - hsq for dvt ppx  - HD as per renal    Patient is actively dying - family is aware and at bedside.  They understand poor prognosis and were hopeful to take her home, but understand that may not be realistic.  They state she would never have wanted to be like this.  They are deciding on when to withdraw care today (stopping vent and pressors) and focusing on comfort.  Patient is DNR/DNI.

## 2023-11-29 NOTE — PROGRESS NOTE ADULT - NUTRITIONAL ASSESSMENT
This patient has been assessed with a concern for Malnutrition and has been determined to have a diagnosis/diagnoses of Severe protein-calorie malnutrition.    This patient is being managed with:   Diet NPO with Tube Feed-  Tube Feeding Modality: Nasogastric  Glucerna 1.5 Judd (GLUCERNA1.5RTH)  Total Volume for 24 Hours (mL): 960  Continuous  Starting Tube Feed Rate {mL per Hour}: 10  Increase Tube Feed Rate by (mL): 10     Every 4 hours  Until Goal Tube Feed Rate (mL per Hour): 40  Tube Feed Duration (in Hours): 24  Tube Feed Start Time: 17:30  No Carb Prosource (1pkg = 15gms Protein)     Qty per Day:  1  Entered: Oct 20 2023  6:52PM  
This patient has been assessed with a concern for Malnutrition and has been determined to have a diagnosis/diagnoses of Severe protein-calorie malnutrition.    This patient is being managed with:   Diet NPO with Tube Feed-  Tube Feeding Modality: Nasogastric  Glucerna 1.5 Judd (GLUCERNA1.5RTH)  Total Volume for 24 Hours (mL): 960  Continuous  Starting Tube Feed Rate {mL per Hour}: 10  Increase Tube Feed Rate by (mL): 10     Every 4 hours  Until Goal Tube Feed Rate (mL per Hour): 40  Tube Feed Duration (in Hours): 24  Tube Feed Start Time: 17:30  No Carb Prosource (1pkg = 15gms Protein)     Qty per Day:  1  Entered: Oct 20 2023  6:52PM  
This patient has been assessed with a concern for Malnutrition and has been determined to have a diagnosis/diagnoses of Severe protein-calorie malnutrition.    This patient is being managed with:   Diet NPO with Tube Feed-  Tube Feeding Modality: Nasogastric  Glucerna 1.5 Judd (GLUCERNA1.5RTH)  Total Volume for 24 Hours (mL): 960  Continuous  Starting Tube Feed Rate {mL per Hour}: 40  Until Goal Tube Feed Rate (mL per Hour): 40  Tube Feed Duration (in Hours): 24  Tube Feed Start Time: 17:30  No Carb Prosource (1pkg = 15gms Protein)     Qty per Day:  1  Entered: Oct  9 2023  5:27PM  
This patient has been assessed with a concern for Malnutrition and has been determined to have a diagnosis/diagnoses of Severe protein-calorie malnutrition.    This patient is being managed with:   Diet NPO with Tube Feed-  Tube Feeding Modality: Nasogastric  Nepro with Carb Steady (NEPRORTH)  Total Volume for 24 Hours (mL): 720  Continuous  Starting Tube Feed Rate {mL per Hour}: 30  Increase Tube Feed Rate by (mL): 0     Every 4 hours  Until Goal Tube Feed Rate (mL per Hour): 30  Tube Feed Duration (in Hours): 24  Tube Feed Start Time: 17:25  Liquid Protein Supplement     Qty per Day:  2  Entered: Nov 13 2023  5:25PM    Diet NPO after Midnight-     NPO Start Date: 13-Nov-2023   NPO Start Time: 23:59  Entered: Nov 13 2023  5:22PM  
This patient has been assessed with a concern for Malnutrition and has been determined to have a diagnosis/diagnoses of Severe protein-calorie malnutrition.    This patient is being managed with:   Diet NPO with Tube Feed-  Tube Feeding Modality: Nasogastric  Nepro with Carb Steady (NEPRORTH)  Total Volume for 24 Hours (mL): 720  Continuous  Starting Tube Feed Rate {mL per Hour}: 30  Increase Tube Feed Rate by (mL): 0     Every 4 hours  Until Goal Tube Feed Rate (mL per Hour): 30  Tube Feed Duration (in Hours): 24  Tube Feed Start Time: 17:25  Liquid Protein Supplement     Qty per Day:  2  Entered: Nov 22 2023  3:54AM  
This patient has been assessed with a concern for Malnutrition and has been determined to have a diagnosis/diagnoses of Severe protein-calorie malnutrition.    This patient is being managed with:   Diet NPO with Tube Feed-  Tube Feeding Modality: Nasogastric  Nepro with Carb Steady (NEPRORTH)  Total Volume for 24 Hours (mL): 840  Continuous  Starting Tube Feed Rate {mL per Hour}: 10  Increase Tube Feed Rate by (mL): 10     Every 4 hours  Until Goal Tube Feed Rate (mL per Hour): 35  Tube Feed Duration (in Hours): 24  Tube Feed Start Time: 17:49  Tube Feed Stop Time: 00:00  No Carb Prosource (1pkg = 15gms Protein)     Qty per Day:  1  Banatrol TF     Qty per Day:  2  Entered: Sep 12 2023  2:26PM  
This patient has been assessed with a concern for Malnutrition and has been determined to have a diagnosis/diagnoses of Severe protein-calorie malnutrition.    This patient is being managed with:   Diet NPO with Tube Feed-  Tube Feeding Modality: Nasogastric  Nepro with Carb Steady (NEPRORTH)  Total Volume for 24 Hours (mL): 840  Continuous  Starting Tube Feed Rate {mL per Hour}: 10  Increase Tube Feed Rate by (mL): 10     Every 4 hours  Until Goal Tube Feed Rate (mL per Hour): 35  Tube Feed Duration (in Hours): 24  Tube Feed Start Time: 17:49  Tube Feed Stop Time: 00:00  No Carb Prosource (1pkg = 15gms Protein)     Qty per Day:  1  Entered: Sep  7 2023  3:58PM  
This patient has been assessed with a concern for Malnutrition and has been determined to have a diagnosis/diagnoses of Severe protein-calorie malnutrition.    This patient is being managed with:   Diet NPO with Tube Feed-  Tube Feeding Modality: Nasogastric  Nepro with Carb Steady (NEPRORTH)  Total Volume for 24 Hours (mL): 840  Continuous  Starting Tube Feed Rate {mL per Hour}: 10  Increase Tube Feed Rate by (mL): 10     Every 4 hours  Until Goal Tube Feed Rate (mL per Hour): 35  Tube Feed Duration (in Hours): 24  Tube Feed Start Time: 17:49  Tube Feed Stop Time: 00:00  No Carb Prosource (1pkg = 15gms Protein)     Qty per Day:  1  Entered: Sep  7 2023  3:58PM  
This patient has been assessed with a concern for Malnutrition and has been determined to have a diagnosis/diagnoses of Severe protein-calorie malnutrition.    This patient is being managed with:   Diet NPO with Tube Feed-  Tube Feeding Modality: Nasogastric  Nepro with Carb Steady (NEPRORTH)  Total Volume for 24 Hours (mL): 840  Continuous  Starting Tube Feed Rate {mL per Hour}: 35  Increase Tube Feed Rate by (mL): 0  Until Goal Tube Feed Rate (mL per Hour): 35  Tube Feed Duration (in Hours): 24  Tube Feed Start Time: 07:45  No Carb Prosource (1pkg = 15gms Protein)     Qty per Day:  1  Banatrol TF     Qty per Day:  2  Entered: Sep 25 2023  7:41AM  
This patient has been assessed with a concern for Malnutrition and has been determined to have a diagnosis/diagnoses of Severe protein-calorie malnutrition.    This patient is being managed with:   Diet NPO with Tube Feed-  Tube Feeding Modality: Nasogastric  Nepro with Carb Steady (NEPRORTH)  Total Volume for 24 Hours (mL): 960  Continuous  Starting Tube Feed Rate {mL per Hour}: 40  Increase Tube Feed Rate by (mL): 0  Until Goal Tube Feed Rate (mL per Hour): 40  Tube Feed Duration (in Hours): 24  Tube Feed Start Time: 16:30  No Carb Prosource (1pkg = 15gms Protein)     Qty per Day:  1  Banatrol TF     Qty per Day:  2  Entered: Sep 28 2023  4:30PM  
This patient has been assessed with a concern for Malnutrition and has been determined to have a diagnosis/diagnoses of Severe protein-calorie malnutrition.    This patient is being managed with:   Diet NPO with Tube Feed-  Tube Feeding Modality: Nasogastric  Nepro with Carb Steady (NEPRORTH)  Total Volume for 24 Hours (mL): 960  Continuous  Starting Tube Feed Rate {mL per Hour}: 40  Increase Tube Feed Rate by (mL): 0  Until Goal Tube Feed Rate (mL per Hour): 40  Tube Feed Duration (in Hours): 24  Tube Feed Start Time: 16:30  No Carb Prosource (1pkg = 15gms Protein)     Qty per Day:  1  Banatrol TF     Qty per Day:  2  Entered: Sep 28 2023  4:30PM  
This patient has been assessed with a concern for Malnutrition and has been determined to have a diagnosis/diagnoses of Severe protein-calorie malnutrition.    This patient is being managed with:   Diet NPO with Tube Feed-  Tube Feeding Modality: Nasogastric  Nepro with Carb Steady (NEPRORTH)  Total Volume for 24 Hours (mL): 960  Continuous  Starting Tube Feed Rate {mL per Hour}: 40  Until Goal Tube Feed Rate (mL per Hour): 40  Tube Feed Duration (in Hours): 24  Tube Feed Start Time: 13:45  No Carb Prosource (1pkg = 15gms Protein)     Qty per Day:  1  Entered: Oct  2 2023  1:45PM  
This patient has been assessed with a concern for Malnutrition and has been determined to have a diagnosis/diagnoses of Severe protein-calorie malnutrition.    This patient is being managed with:   Diet NPO with Tube Feed-  Tube Feeding Modality: Orogastric  Nepro with Carb Steady (NEPRORTH)  Total Volume for 24 Hours (mL): 280  Continuous  Starting Tube Feed Rate {mL per Hour}: 10  Increase Tube Feed Rate by (mL): 10     Every 4 hours  Until Goal Tube Feed Rate (mL per Hour): 40  Tube Feed Duration (in Hours): 7  Tube Feed Start Time: 17:49  Tube Feed Stop Time: 00:00  Entered: Aug 26 2023  2:44PM  
This patient has been assessed with a concern for Malnutrition and has been determined to have a diagnosis/diagnoses of Severe protein-calorie malnutrition.    This patient is being managed with:   Diet NPO with Tube Feed-  Tube Feeding Modality: Orogastric  Nepro with Carb Steady (NEPRORTH)  Total Volume for 24 Hours (mL): 280  Continuous  Starting Tube Feed Rate {mL per Hour}: 10  Increase Tube Feed Rate by (mL): 10     Every 4 hours  Until Goal Tube Feed Rate (mL per Hour): 40  Tube Feed Duration (in Hours): 7  Tube Feed Start Time: 17:49  Tube Feed Stop Time: 00:00  Entered: Aug 26 2023  2:44PM  
This patient has been assessed with a concern for Malnutrition and has been determined to have a diagnosis/diagnoses of Severe protein-calorie malnutrition.    This patient is being managed with:   Diet NPO after Midnight-     NPO Start Date: 20-Nov-2023   NPO Start Time: 23:59  Except Medications  Entered: Nov 20 2023  4:37PM    Diet NPO with Tube Feed-  Tube Feeding Modality: Nasogastric  Nepro with Carb Steady (NEPRORTH)  Total Volume for 24 Hours (mL): 720  Continuous  Starting Tube Feed Rate {mL per Hour}: 30  Increase Tube Feed Rate by (mL): 0     Every 4 hours  Until Goal Tube Feed Rate (mL per Hour): 30  Tube Feed Duration (in Hours): 24  Tube Feed Start Time: 17:25  Liquid Protein Supplement     Qty per Day:  2  Entered: Nov 13 2023  5:25PM  
This patient has been assessed with a concern for Malnutrition and has been determined to have a diagnosis/diagnoses of Severe protein-calorie malnutrition.    This patient is being managed with:   Diet NPO with Tube Feed-  Tube Feeding Modality: Nasogastric  Glucerna 1.5 Judd (GLUCERNA1.5RTH)  Total Volume for 24 Hours (mL): 960  Continuous  Starting Tube Feed Rate {mL per Hour}: 10  Increase Tube Feed Rate by (mL): 10     Every 4 hours  Until Goal Tube Feed Rate (mL per Hour): 40  Tube Feed Duration (in Hours): 24  Tube Feed Start Time: 17:30  No Carb Prosource (1pkg = 15gms Protein)     Qty per Day:  1  Entered: Oct 20 2023  6:52PM  
This patient has been assessed with a concern for Malnutrition and has been determined to have a diagnosis/diagnoses of Severe protein-calorie malnutrition.    This patient is being managed with:   Diet NPO with Tube Feed-  Tube Feeding Modality: Nasogastric  Glucerna 1.5 Judd (GLUCERNA1.5RTH)  Total Volume for 24 Hours (mL): 960  Continuous  Starting Tube Feed Rate {mL per Hour}: 50  Until Goal Tube Feed Rate (mL per Hour): 40  Tube Feed Duration (in Hours): 24  Tube Feed Start Time: 17:30  No Carb Prosource (1pkg = 15gms Protein)     Qty per Day:  1  Entered: Oct 11 2023 11:52AM  
This patient has been assessed with a concern for Malnutrition and has been determined to have a diagnosis/diagnoses of Severe protein-calorie malnutrition.    This patient is being managed with:   Diet NPO with Tube Feed-  Tube Feeding Modality: Nasogastric  Nepro with Carb Steady (NEPRORTH)  Total Volume for 24 Hours (mL): 720  Continuous  Starting Tube Feed Rate {mL per Hour}: 30  Increase Tube Feed Rate by (mL): 0     Every 4 hours  Until Goal Tube Feed Rate (mL per Hour): 30  Tube Feed Duration (in Hours): 24  Tube Feed Start Time: 17:25  Liquid Protein Supplement     Qty per Day:  2  Entered: Nov 13 2023  5:25PM  
This patient has been assessed with a concern for Malnutrition and has been determined to have a diagnosis/diagnoses of Severe protein-calorie malnutrition.    This patient is being managed with:   Diet NPO with Tube Feed-  Tube Feeding Modality: Nasogastric  Nepro with Carb Steady (NEPRORTH)  Total Volume for 24 Hours (mL): 720  Continuous  Starting Tube Feed Rate {mL per Hour}: 30  Increase Tube Feed Rate by (mL): 0     Every 4 hours  Until Goal Tube Feed Rate (mL per Hour): 30  Tube Feed Duration (in Hours): 24  Tube Feed Start Time: 17:25  Liquid Protein Supplement     Qty per Day:  2  Entered: Nov 22 2023  3:54AM  
This patient has been assessed with a concern for Malnutrition and has been determined to have a diagnosis/diagnoses of Severe protein-calorie malnutrition.    This patient is being managed with:   Diet NPO with Tube Feed-  Tube Feeding Modality: Nasogastric  Nepro with Carb Steady (NEPRORTH)  Total Volume for 24 Hours (mL): 840  Continuous  Starting Tube Feed Rate {mL per Hour}: 10  Increase Tube Feed Rate by (mL): 10     Every 4 hours  Until Goal Tube Feed Rate (mL per Hour): 35  Tube Feed Duration (in Hours): 24  Tube Feed Start Time: 17:49  Tube Feed Stop Time: 00:00  No Carb Prosource (1pkg = 15gms Protein)     Qty per Day:  1  Entered: Sep  7 2023  3:58PM  
This patient has been assessed with a concern for Malnutrition and has been determined to have a diagnosis/diagnoses of Severe protein-calorie malnutrition.    This patient is being managed with:   Diet NPO with Tube Feed-  Tube Feeding Modality: Nasogastric  Nepro with Carb Steady (NEPRORTH)  Total Volume for 24 Hours (mL): 840  Continuous  Starting Tube Feed Rate {mL per Hour}: 10  Increase Tube Feed Rate by (mL): 10     Every 4 hours  Until Goal Tube Feed Rate (mL per Hour): 35  Tube Feed Duration (in Hours): 24  Tube Feed Start Time: 17:49  Tube Feed Stop Time: 00:00  No Carb Prosource (1pkg = 15gms Protein)     Qty per Day:  1  Entered: Sep  7 2023  3:58PM  
This patient has been assessed with a concern for Malnutrition and has been determined to have a diagnosis/diagnoses of Severe protein-calorie malnutrition.    This patient is being managed with:   Diet NPO with Tube Feed-  Tube Feeding Modality: Nasogastric  Nepro with Carb Steady (NEPRORTH)  Total Volume for 24 Hours (mL): 840  Continuous  Starting Tube Feed Rate {mL per Hour}: 35  Increase Tube Feed Rate by (mL): 0  Until Goal Tube Feed Rate (mL per Hour): 35  Tube Feed Duration (in Hours): 24  Tube Feed Start Time: 07:45  No Carb Prosource (1pkg = 15gms Protein)     Qty per Day:  1  Banatrol TF     Qty per Day:  2  Entered: Sep 25 2023  7:41AM  
This patient has been assessed with a concern for Malnutrition and has been determined to have a diagnosis/diagnoses of Severe protein-calorie malnutrition.    This patient is being managed with:   Diet NPO with Tube Feed-  Tube Feeding Modality: Nasogastric  Nepro with Carb Steady (NEPRORTH)  Total Volume for 24 Hours (mL): 840  Continuous  Starting Tube Feed Rate {mL per Hour}: 35  Increase Tube Feed Rate by (mL): 0  Until Goal Tube Feed Rate (mL per Hour): 35  Tube Feed Duration (in Hours): 24  Tube Feed Start Time: 07:45  No Carb Prosource (1pkg = 15gms Protein)     Qty per Day:  1  Banatrol TF     Qty per Day:  2  Entered: Sep 25 2023  7:41AM  
This patient has been assessed with a concern for Malnutrition and has been determined to have a diagnosis/diagnoses of Severe protein-calorie malnutrition.    This patient is being managed with:   Diet NPO with Tube Feed-  Tube Feeding Modality: Nasogastric  Nepro with Carb Steady (NEPRORTH)  Total Volume for 24 Hours (mL): 960  Continuous  Starting Tube Feed Rate {mL per Hour}: 40  Until Goal Tube Feed Rate (mL per Hour): 40  Tube Feed Duration (in Hours): 24  Tube Feed Start Time: 13:45  No Carb Prosource (1pkg = 15gms Protein)     Qty per Day:  1  Entered: Oct  2 2023  1:45PM  
This patient has been assessed with a concern for Malnutrition and has been determined to have a diagnosis/diagnoses of Severe protein-calorie malnutrition.    This patient is being managed with:   Diet NPO with Tube Feed-  Tube Feeding Modality: Nasogastric  Nepro with Carb Steady (NEPRORTH)  Total Volume for 24 Hours (mL): 960  Continuous  Starting Tube Feed Rate {mL per Hour}: 40  Until Goal Tube Feed Rate (mL per Hour): 40  Tube Feed Duration (in Hours): 24  Tube Feed Start Time: 13:45  No Carb Prosource (1pkg = 15gms Protein)     Qty per Day:  1  Entered: Oct  2 2023  1:45PM  
This patient has been assessed with a concern for Malnutrition and has been determined to have a diagnosis/diagnoses of Severe protein-calorie malnutrition.    This patient is being managed with:   Diet NPO with Tube Feed-  Tube Feeding Modality: Orogastric  Nepro with Carb Steady (NEPRORTH)  Total Volume for 24 Hours (mL): 280  Continuous  Starting Tube Feed Rate {mL per Hour}: 10  Increase Tube Feed Rate by (mL): 10     Every 4 hours  Until Goal Tube Feed Rate (mL per Hour): 40  Tube Feed Duration (in Hours): 7  Tube Feed Start Time: 17:49  Tube Feed Stop Time: 00:00  Entered: Aug 26 2023  2:44PM  
This patient has been assessed with a concern for Malnutrition and has been determined to have a diagnosis/diagnoses of Severe protein-calorie malnutrition.    This patient is being managed with:   Diet NPO with Tube Feed-  Tube Feeding Modality: Orogastric  Nepro with Carb Steady (NEPRORTH)  Total Volume for 24 Hours (mL): 280  Continuous  Starting Tube Feed Rate {mL per Hour}: 10  Increase Tube Feed Rate by (mL): 10     Every 4 hours  Until Goal Tube Feed Rate (mL per Hour): 40  Tube Feed Duration (in Hours): 7  Tube Feed Start Time: 17:49  Tube Feed Stop Time: 00:00  Entered: Aug 26 2023  2:44PM  
This patient has been assessed with a concern for Malnutrition and has been determined to have a diagnosis/diagnoses of Severe protein-calorie malnutrition.    This patient is being managed with:   Diet NPO with Tube Feed-  Tube Feeding Modality: Nasogastric  Glucerna 1.5 Judd (GLUCERNA1.5RTH)  Total Volume for 24 Hours (mL): 960  Continuous  Starting Tube Feed Rate {mL per Hour}: 10  Increase Tube Feed Rate by (mL): 10     Every 4 hours  Until Goal Tube Feed Rate (mL per Hour): 40  Tube Feed Duration (in Hours): 24  Tube Feed Start Time: 17:30  No Carb Prosource (1pkg = 15gms Protein)     Qty per Day:  1  Entered: Oct 20 2023  6:52PM  
This patient has been assessed with a concern for Malnutrition and has been determined to have a diagnosis/diagnoses of Severe protein-calorie malnutrition.    This patient is being managed with:   Diet NPO with Tube Feed-  Tube Feeding Modality: Nasogastric  Glucerna 1.5 Judd (GLUCERNA1.5RTH)  Total Volume for 24 Hours (mL): 960  Continuous  Starting Tube Feed Rate {mL per Hour}: 40  Until Goal Tube Feed Rate (mL per Hour): 40  Tube Feed Duration (in Hours): 24  Tube Feed Start Time: 17:30  No Carb Prosource (1pkg = 15gms Protein)     Qty per Day:  1  Entered: Oct  9 2023  5:27PM  
This patient has been assessed with a concern for Malnutrition and has been determined to have a diagnosis/diagnoses of Severe protein-calorie malnutrition.    This patient is being managed with:   Diet NPO with Tube Feed-  Tube Feeding Modality: Nasogastric  Glucerna 1.5 Judd (GLUCERNA1.5RTH)  Total Volume for 24 Hours (mL): 960  Continuous  Starting Tube Feed Rate {mL per Hour}: 50  Until Goal Tube Feed Rate (mL per Hour): 40  Tube Feed Duration (in Hours): 24  Tube Feed Start Time: 17:30  No Carb Prosource (1pkg = 15gms Protein)     Qty per Day:  1  Entered: Oct 11 2023 11:52AM  
This patient has been assessed with a concern for Malnutrition and has been determined to have a diagnosis/diagnoses of Severe protein-calorie malnutrition.    This patient is being managed with:   Diet NPO with Tube Feed-  Tube Feeding Modality: Nasogastric  Glucerna 1.5 Judd (GLUCERNA1.5RTH)  Total Volume for 24 Hours (mL): 960  Continuous  Starting Tube Feed Rate {mL per Hour}: 50  Until Goal Tube Feed Rate (mL per Hour): 40  Tube Feed Duration (in Hours): 24  Tube Feed Start Time: 17:30  No Carb Prosource (1pkg = 15gms Protein)     Qty per Day:  1  Entered: Oct 11 2023 11:52AM  
This patient has been assessed with a concern for Malnutrition and has been determined to have a diagnosis/diagnoses of Severe protein-calorie malnutrition.    This patient is being managed with:   Diet NPO with Tube Feed-  Tube Feeding Modality: Nasogastric  Nepro with Carb Steady (NEPRORTH)  Total Volume for 24 Hours (mL): 720  Continuous  Starting Tube Feed Rate {mL per Hour}: 30  Increase Tube Feed Rate by (mL): 0     Every 4 hours  Until Goal Tube Feed Rate (mL per Hour): 30  Tube Feed Duration (in Hours): 24  Tube Feed Start Time: 17:25  Liquid Protein Supplement     Qty per Day:  2  Entered: Nov 13 2023  5:25PM  
This patient has been assessed with a concern for Malnutrition and has been determined to have a diagnosis/diagnoses of Severe protein-calorie malnutrition.    This patient is being managed with:   Diet NPO with Tube Feed-  Tube Feeding Modality: Nasogastric  Nepro with Carb Steady (NEPRORTH)  Total Volume for 24 Hours (mL): 720  Continuous  Starting Tube Feed Rate {mL per Hour}: 30  Increase Tube Feed Rate by (mL): 0     Every 4 hours  Until Goal Tube Feed Rate (mL per Hour): 30  Tube Feed Duration (in Hours): 24  Tube Feed Start Time: 17:25  Liquid Protein Supplement     Qty per Day:  2  Entered: Nov 22 2023  3:54AM  
This patient has been assessed with a concern for Malnutrition and has been determined to have a diagnosis/diagnoses of Severe protein-calorie malnutrition.    This patient is being managed with:   Diet NPO with Tube Feed-  Tube Feeding Modality: Nasogastric  Nepro with Carb Steady (NEPRORTH)  Total Volume for 24 Hours (mL): 720  Continuous  Starting Tube Feed Rate {mL per Hour}: 30  Increase Tube Feed Rate by (mL): 0     Every 4 hours  Until Goal Tube Feed Rate (mL per Hour): 30  Tube Feed Duration (in Hours): 24  Tube Feed Start Time: 17:25  Liquid Protein Supplement     Qty per Day:  2  Entered: Nov 22 2023  3:54AM  
This patient has been assessed with a concern for Malnutrition and has been determined to have a diagnosis/diagnoses of Severe protein-calorie malnutrition.    This patient is being managed with:   Diet NPO with Tube Feed-  Tube Feeding Modality: Nasogastric  Nepro with Carb Steady (NEPRORTH)  Total Volume for 24 Hours (mL): 840  Continuous  Starting Tube Feed Rate {mL per Hour}: 10  Increase Tube Feed Rate by (mL): 10     Every 4 hours  Until Goal Tube Feed Rate (mL per Hour): 35  Tube Feed Duration (in Hours): 24  Tube Feed Start Time: 17:49  Tube Feed Stop Time: 00:00  No Carb Prosource (1pkg = 15gms Protein)     Qty per Day:  1  Banatrol TF     Qty per Day:  2  Entered: Sep 12 2023  2:26PM  
This patient has been assessed with a concern for Malnutrition and has been determined to have a diagnosis/diagnoses of Severe protein-calorie malnutrition.    This patient is being managed with:   Diet NPO with Tube Feed-  Tube Feeding Modality: Nasogastric  Nepro with Carb Steady (NEPRORTH)  Total Volume for 24 Hours (mL): 840  Continuous  Starting Tube Feed Rate {mL per Hour}: 10  Increase Tube Feed Rate by (mL): 10     Every 4 hours  Until Goal Tube Feed Rate (mL per Hour): 35  Tube Feed Duration (in Hours): 24  Tube Feed Start Time: 17:49  Tube Feed Stop Time: 00:00  No Carb Prosource (1pkg = 15gms Protein)     Qty per Day:  1  Entered: Sep  7 2023  3:58PM  
This patient has been assessed with a concern for Malnutrition and has been determined to have a diagnosis/diagnoses of Severe protein-calorie malnutrition.    This patient is being managed with:   Diet NPO with Tube Feed-  Tube Feeding Modality: Nasogastric  Nepro with Carb Steady (NEPRORTH)  Total Volume for 24 Hours (mL): 840  Continuous  Starting Tube Feed Rate {mL per Hour}: 10  Increase Tube Feed Rate by (mL): 10     Every 4 hours  Until Goal Tube Feed Rate (mL per Hour): 35  Tube Feed Duration (in Hours): 24  Tube Feed Start Time: 17:49  Tube Feed Stop Time: 00:00  No Carb Prosource (1pkg = 15gms Protein)     Qty per Day:  1  Entered: Sep  7 2023  3:58PM  
This patient has been assessed with a concern for Malnutrition and has been determined to have a diagnosis/diagnoses of Severe protein-calorie malnutrition.    This patient is being managed with:   Diet NPO with Tube Feed-  Tube Feeding Modality: Nasogastric  Nepro with Carb Steady (NEPRORTH)  Total Volume for 24 Hours (mL): 840  Continuous  Starting Tube Feed Rate {mL per Hour}: 35  Increase Tube Feed Rate by (mL): 0  Until Goal Tube Feed Rate (mL per Hour): 35  Tube Feed Duration (in Hours): 24  Tube Feed Start Time: 07:45  No Carb Prosource (1pkg = 15gms Protein)     Qty per Day:  1  Banatrol TF     Qty per Day:  2  Entered: Sep 25 2023  7:41AM  
This patient has been assessed with a concern for Malnutrition and has been determined to have a diagnosis/diagnoses of Severe protein-calorie malnutrition.    This patient is being managed with:   Diet NPO with Tube Feed-  Tube Feeding Modality: Nasogastric  Nepro with Carb Steady (NEPRORTH)  Total Volume for 24 Hours (mL): 960  Continuous  Starting Tube Feed Rate {mL per Hour}: 10  Increase Tube Feed Rate by (mL): 10     Every 4 hours  Until Goal Tube Feed Rate (mL per Hour): 40  Tube Feed Duration (in Hours): 24  Tube Feed Start Time: 17:49  Tube Feed Stop Time: 00:00  No Carb Prosource (1pkg = 15gms Protein)     Qty per Day:  1  Entered: Sep  6 2023  2:01PM  
This patient has been assessed with a concern for Malnutrition and has been determined to have a diagnosis/diagnoses of Severe protein-calorie malnutrition.    This patient is being managed with:   Diet NPO with Tube Feed-  Tube Feeding Modality: Nasogastric  Nepro with Carb Steady (NEPRORTH)  Total Volume for 24 Hours (mL): 960  Continuous  Starting Tube Feed Rate {mL per Hour}: 40  Increase Tube Feed Rate by (mL): 0  Until Goal Tube Feed Rate (mL per Hour): 40  Tube Feed Duration (in Hours): 24  Tube Feed Start Time: 16:30  No Carb Prosource (1pkg = 15gms Protein)     Qty per Day:  1  Banatrol TF     Qty per Day:  2  Entered: Sep 28 2023  4:30PM  
This patient has been assessed with a concern for Malnutrition and has been determined to have a diagnosis/diagnoses of Severe protein-calorie malnutrition.    This patient is being managed with:   Diet NPO with Tube Feed-  Tube Feeding Modality: Nasogastric  Nepro with Carb Steady (NEPRORTH)  Total Volume for 24 Hours (mL): 960  Continuous  Starting Tube Feed Rate {mL per Hour}: 40  Until Goal Tube Feed Rate (mL per Hour): 40  Tube Feed Duration (in Hours): 24  Tube Feed Start Time: 13:45  No Carb Prosource (1pkg = 15gms Protein)     Qty per Day:  1  Entered: Oct  2 2023  1:45PM  
This patient has been assessed with a concern for Malnutrition and has been determined to have a diagnosis/diagnoses of Severe protein-calorie malnutrition.    This patient is being managed with:   Diet NPO with Tube Feed-  Tube Feeding Modality: Nasogastric  Nepro with Carb Steady (NEPRORTH)  Total Volume for 24 Hours (mL): 960  Continuous  Starting Tube Feed Rate {mL per Hour}: 40  Until Goal Tube Feed Rate (mL per Hour): 40  Tube Feed Duration (in Hours): 24  Tube Feed Start Time: 13:45  No Carb Prosource (1pkg = 15gms Protein)     Qty per Day:  1  Entered: Oct  2 2023  1:45PM  
This patient has been assessed with a concern for Malnutrition and has been determined to have a diagnosis/diagnoses of Severe protein-calorie malnutrition.    This patient is being managed with:   Diet NPO with Tube Feed-  Tube Feeding Modality: Orogastric  Nepro with Carb Steady (NEPRORTH)  Total Volume for 24 Hours (mL): 280  Continuous  Starting Tube Feed Rate {mL per Hour}: 10  Increase Tube Feed Rate by (mL): 10     Every 4 hours  Until Goal Tube Feed Rate (mL per Hour): 40  Tube Feed Duration (in Hours): 7  Tube Feed Start Time: 17:49  Tube Feed Stop Time: 00:00  Entered: Aug 26 2023  2:44PM  
This patient has been assessed with a concern for Malnutrition and has been determined to have a diagnosis/diagnoses of Severe protein-calorie malnutrition.    This patient is being managed with:   Diet NPO after Midnight-     NPO Start Date: 21-Nov-2023   NPO Start Time: 23:59  Except Medications  Entered: Nov 22 2023  3:53AM  
This patient has been assessed with a concern for Malnutrition and has been determined to have a diagnosis/diagnoses of Severe protein-calorie malnutrition.    This patient is being managed with:   Diet NPO with Tube Feed-  Tube Feeding Modality: Nasogastric  Glucerna 1.5 Judd (GLUCERNA1.5RTH)  Total Volume for 24 Hours (mL): 960  Continuous  Starting Tube Feed Rate {mL per Hour}: 10  Increase Tube Feed Rate by (mL): 10     Every 4 hours  Until Goal Tube Feed Rate (mL per Hour): 40  Tube Feed Duration (in Hours): 24  Tube Feed Start Time: 17:30  No Carb Prosource (1pkg = 15gms Protein)     Qty per Day:  1  Entered: Oct 20 2023  6:52PM  
This patient has been assessed with a concern for Malnutrition and has been determined to have a diagnosis/diagnoses of Severe protein-calorie malnutrition.    This patient is being managed with:   Diet NPO with Tube Feed-  Tube Feeding Modality: Nasogastric  Glucerna 1.5 Judd (GLUCERNA1.5RTH)  Total Volume for 24 Hours (mL): 960  Continuous  Starting Tube Feed Rate {mL per Hour}: 50  Until Goal Tube Feed Rate (mL per Hour): 40  Tube Feed Duration (in Hours): 24  Tube Feed Start Time: 17:30  No Carb Prosource (1pkg = 15gms Protein)     Qty per Day:  1  Entered: Oct 11 2023 11:52AM  
This patient has been assessed with a concern for Malnutrition and has been determined to have a diagnosis/diagnoses of Severe protein-calorie malnutrition.    This patient is being managed with:   Diet NPO with Tube Feed-  Tube Feeding Modality: Nasogastric  Glucerna 1.5 Judd (GLUCERNA1.5RTH)  Total Volume for 24 Hours (mL): 960  Continuous  Starting Tube Feed Rate {mL per Hour}: 50  Until Goal Tube Feed Rate (mL per Hour): 40  Tube Feed Duration (in Hours): 24  Tube Feed Start Time: 17:30  No Carb Prosource (1pkg = 15gms Protein)     Qty per Day:  1  Entered: Oct 11 2023 11:52AM  
This patient has been assessed with a concern for Malnutrition and has been determined to have a diagnosis/diagnoses of Severe protein-calorie malnutrition.    This patient is being managed with:   Diet NPO with Tube Feed-  Tube Feeding Modality: Nasogastric  Nepro with Carb Steady (NEPRORTH)  Total Volume for 24 Hours (mL): 720  Continuous  Starting Tube Feed Rate {mL per Hour}: 30  Increase Tube Feed Rate by (mL): 0     Every 4 hours  Until Goal Tube Feed Rate (mL per Hour): 30  Tube Feed Duration (in Hours): 24  Tube Feed Start Time: 17:25  Liquid Protein Supplement     Qty per Day:  2  Entered: Nov 22 2023  3:54AM  
This patient has been assessed with a concern for Malnutrition and has been determined to have a diagnosis/diagnoses of Severe protein-calorie malnutrition.    This patient is being managed with:   Diet NPO with Tube Feed-  Tube Feeding Modality: Nasogastric  Nepro with Carb Steady (NEPRORTH)  Total Volume for 24 Hours (mL): 840  Continuous  Starting Tube Feed Rate {mL per Hour}: 10  Increase Tube Feed Rate by (mL): 10     Every 4 hours  Until Goal Tube Feed Rate (mL per Hour): 35  Tube Feed Duration (in Hours): 24  Tube Feed Start Time: 17:49  Tube Feed Stop Time: 00:00  No Carb Prosource (1pkg = 15gms Protein)     Qty per Day:  1  Banatrol TF     Qty per Day:  2  Entered: Sep 12 2023  2:26PM  
This patient has been assessed with a concern for Malnutrition and has been determined to have a diagnosis/diagnoses of Severe protein-calorie malnutrition.    This patient is being managed with:   Diet NPO with Tube Feed-  Tube Feeding Modality: Nasogastric  Nepro with Carb Steady (NEPRORTH)  Total Volume for 24 Hours (mL): 840  Continuous  Starting Tube Feed Rate {mL per Hour}: 10  Increase Tube Feed Rate by (mL): 10     Every 4 hours  Until Goal Tube Feed Rate (mL per Hour): 35  Tube Feed Duration (in Hours): 24  Tube Feed Start Time: 17:49  Tube Feed Stop Time: 00:00  No Carb Prosource (1pkg = 15gms Protein)     Qty per Day:  1  Entered: Sep  7 2023  3:58PM  
This patient has been assessed with a concern for Malnutrition and has been determined to have a diagnosis/diagnoses of Severe protein-calorie malnutrition.    This patient is being managed with:   Diet NPO with Tube Feed-  Tube Feeding Modality: Nasogastric  Nepro with Carb Steady (NEPRORTH)  Total Volume for 24 Hours (mL): 960  Continuous  Starting Tube Feed Rate {mL per Hour}: 40  Until Goal Tube Feed Rate (mL per Hour): 40  Tube Feed Duration (in Hours): 24  Tube Feed Start Time: 13:45  No Carb Prosource (1pkg = 15gms Protein)     Qty per Day:  1  Entered: Oct  2 2023  1:45PM  
This patient has been assessed with a concern for Malnutrition and has been determined to have a diagnosis/diagnoses of Severe protein-calorie malnutrition.    This patient is being managed with:   Diet NPO with Tube Feed-  Tube Feeding Modality: Orogastric  Nepro with Carb Steady (NEPRORTH)  Total Volume for 24 Hours (mL): 280  Continuous  Starting Tube Feed Rate {mL per Hour}: 10  Increase Tube Feed Rate by (mL): 10     Every 4 hours  Until Goal Tube Feed Rate (mL per Hour): 40  Tube Feed Duration (in Hours): 7  Tube Feed Start Time: 17:49  Tube Feed Stop Time: 00:00  Entered: Aug 26 2023  2:44PM  
Renal Attending   Physical Exam:  Lungs :diminished at bases  Heart: Normal S1,S2  Abdomen: soft ,non tender  Extremites : edema  Patient seen and examined. chart and NP note reviewed. I agreed with plan ans assessment and plan of care as written, will do PUF today as tolerated 1.5-2 liters        Rachael Cali  [facility]  Office: [phone]
This patient has been assessed with a concern for Malnutrition and has been determined to have a diagnosis/diagnoses of Severe protein-calorie malnutrition.    This patient is being managed with:   Diet NPO after Midnight-     NPO Start Date: 30-Aug-2023   NPO Start Time: 23:59  Except Medications  Entered: Aug 30 2023 11:36AM    Diet NPO with Tube Feed-  Tube Feeding Modality: Orogastric  Nepro with Carb Steady (NEPRORTH)  Total Volume for 24 Hours (mL): 280  Continuous  Starting Tube Feed Rate {mL per Hour}: 10  Increase Tube Feed Rate by (mL): 10     Every 4 hours  Until Goal Tube Feed Rate (mL per Hour): 40  Tube Feed Duration (in Hours): 7  Tube Feed Start Time: 17:49  Tube Feed Stop Time: 00:00  Entered: Aug 26 2023  2:44PM  
This patient has been assessed with a concern for Malnutrition and has been determined to have a diagnosis/diagnoses of Severe protein-calorie malnutrition.    This patient is being managed with:   Diet NPO with Tube Feed-  Tube Feeding Modality: Nasogastric  Glucerna 1.5 Judd (GLUCERNA1.5RTH)  Total Volume for 24 Hours (mL): 960  Continuous  Starting Tube Feed Rate {mL per Hour}: 10  Increase Tube Feed Rate by (mL): 10     Every 4 hours  Until Goal Tube Feed Rate (mL per Hour): 40  Tube Feed Duration (in Hours): 24  Tube Feed Start Time: 17:30  No Carb Prosource (1pkg = 15gms Protein)     Qty per Day:  1  Entered: Oct 20 2023  6:52PM  
This patient has been assessed with a concern for Malnutrition and has been determined to have a diagnosis/diagnoses of Severe protein-calorie malnutrition.    This patient is being managed with:   Diet NPO with Tube Feed-  Tube Feeding Modality: Nasogastric  Glucerna 1.5 Judd (GLUCERNA1.5RTH)  Total Volume for 24 Hours (mL): 960  Continuous  Starting Tube Feed Rate {mL per Hour}: 50  Until Goal Tube Feed Rate (mL per Hour): 40  Tube Feed Duration (in Hours): 24  Tube Feed Start Time: 17:30  No Carb Prosource (1pkg = 15gms Protein)     Qty per Day:  1  Entered: Oct 11 2023 11:52AM  
This patient has been assessed with a concern for Malnutrition and has been determined to have a diagnosis/diagnoses of Severe protein-calorie malnutrition.    This patient is being managed with:   Diet NPO with Tube Feed-  Tube Feeding Modality: Nasogastric  Glucerna 1.5 Judd (GLUCERNA1.5RTH)  Total Volume for 24 Hours (mL): 960  Continuous  Starting Tube Feed Rate {mL per Hour}: 50  Until Goal Tube Feed Rate (mL per Hour): 40  Tube Feed Duration (in Hours): 24  Tube Feed Start Time: 17:30  No Carb Prosource (1pkg = 15gms Protein)     Qty per Day:  1  Entered: Oct 11 2023 11:52AM  
This patient has been assessed with a concern for Malnutrition and has been determined to have a diagnosis/diagnoses of Severe protein-calorie malnutrition.    This patient is being managed with:   Diet NPO with Tube Feed-  Tube Feeding Modality: Nasogastric  Nepro with Carb Steady (NEPRORTH)  Total Volume for 24 Hours (mL): 720  Continuous  Starting Tube Feed Rate {mL per Hour}: 30  Increase Tube Feed Rate by (mL): 0     Every 4 hours  Until Goal Tube Feed Rate (mL per Hour): 30  Tube Feed Duration (in Hours): 24  Tube Feed Start Time: 17:25  Liquid Protein Supplement     Qty per Day:  2  Entered: Nov 13 2023  5:25PM  
This patient has been assessed with a concern for Malnutrition and has been determined to have a diagnosis/diagnoses of Severe protein-calorie malnutrition.    This patient is being managed with:   Diet NPO with Tube Feed-  Tube Feeding Modality: Nasogastric  Nepro with Carb Steady (NEPRORTH)  Total Volume for 24 Hours (mL): 840  Continuous  Starting Tube Feed Rate {mL per Hour}: 10  Increase Tube Feed Rate by (mL): 10     Every 4 hours  Until Goal Tube Feed Rate (mL per Hour): 35  Tube Feed Duration (in Hours): 24  Tube Feed Start Time: 17:49  Tube Feed Stop Time: 00:00  No Carb Prosource (1pkg = 15gms Protein)     Qty per Day:  1  Banatrol TF     Qty per Day:  2  Entered: Sep 12 2023  2:26PM  
This patient has been assessed with a concern for Malnutrition and has been determined to have a diagnosis/diagnoses of Severe protein-calorie malnutrition.    This patient is being managed with:   Diet NPO with Tube Feed-  Tube Feeding Modality: Nasogastric  Nepro with Carb Steady (NEPRORTH)  Total Volume for 24 Hours (mL): 840  Continuous  Starting Tube Feed Rate {mL per Hour}: 35  Increase Tube Feed Rate by (mL): 0  Until Goal Tube Feed Rate (mL per Hour): 35  Tube Feed Duration (in Hours): 24  Tube Feed Start Time: 07:45  No Carb Prosource (1pkg = 15gms Protein)     Qty per Day:  1  Banatrol TF     Qty per Day:  2  Entered: Sep 25 2023  7:41AM  
This patient has been assessed with a concern for Malnutrition and has been determined to have a diagnosis/diagnoses of Severe protein-calorie malnutrition.    This patient is being managed with:   Diet NPO with Tube Feed-  Tube Feeding Modality: Nasogastric  Nepro with Carb Steady (NEPRORTH)  Total Volume for 24 Hours (mL): 960  Continuous  Starting Tube Feed Rate {mL per Hour}: 40  Increase Tube Feed Rate by (mL): 0  Until Goal Tube Feed Rate (mL per Hour): 40  Tube Feed Duration (in Hours): 24  Tube Feed Start Time: 16:30  No Carb Prosource (1pkg = 15gms Protein)     Qty per Day:  1  Banatrol TF     Qty per Day:  2  Entered: Sep 28 2023  4:30PM  
This patient has been assessed with a concern for Malnutrition and has been determined to have a diagnosis/diagnoses of Severe protein-calorie malnutrition.    This patient is being managed with:   Diet NPO with Tube Feed-  Tube Feeding Modality: Nasogastric  Nepro with Carb Steady (NEPRORTH)  Total Volume for 24 Hours (mL): 960  Continuous  Starting Tube Feed Rate {mL per Hour}: 40  Increase Tube Feed Rate by (mL): 0  Until Goal Tube Feed Rate (mL per Hour): 40  Tube Feed Duration (in Hours): 24  Tube Feed Start Time: 16:30  No Carb Prosource (1pkg = 15gms Protein)     Qty per Day:  1  Banatrol TF     Qty per Day:  2  Entered: Sep 28 2023  4:30PM  
This patient has been assessed with a concern for Malnutrition and has been determined to have a diagnosis/diagnoses of Severe protein-calorie malnutrition.    This patient is being managed with:   Diet NPO with Tube Feed-  Tube Feeding Modality: Nasogastric  Nepro with Carb Steady (NEPRORTH)  Total Volume for 24 Hours (mL): 960  Continuous  Starting Tube Feed Rate {mL per Hour}: 40  Until Goal Tube Feed Rate (mL per Hour): 40  Tube Feed Duration (in Hours): 24  Tube Feed Start Time: 13:45  No Carb Prosource (1pkg = 15gms Protein)     Qty per Day:  1  Entered: Oct  2 2023  1:45PM  
This patient has been assessed with a concern for Malnutrition and has been determined to have a diagnosis/diagnoses of Severe protein-calorie malnutrition.    This patient is being managed with:   Diet NPO with Tube Feed-  Tube Feeding Modality: Orogastric  Nepro with Carb Steady (NEPRORTH)  Total Volume for 24 Hours (mL): 280  Continuous  Starting Tube Feed Rate {mL per Hour}: 10  Increase Tube Feed Rate by (mL): 10     Every 4 hours  Until Goal Tube Feed Rate (mL per Hour): 40  Tube Feed Duration (in Hours): 7  Tube Feed Start Time: 17:49  Tube Feed Stop Time: 00:00  Entered: Aug 26 2023  2:44PM  
This patient has been assessed with a concern for Malnutrition and has been determined to have a diagnosis/diagnoses of Severe protein-calorie malnutrition.    This patient is being managed with:   Diet NPO with Tube Feed-  Tube Feeding Modality: Orogastric  Nepro with Carb Steady (NEPRORTH)  Total Volume for 24 Hours (mL): 280  Continuous  Starting Tube Feed Rate {mL per Hour}: 10  Increase Tube Feed Rate by (mL): 10     Every 4 hours  Until Goal Tube Feed Rate (mL per Hour): 40  Tube Feed Duration (in Hours): 7  Tube Feed Start Time: 17:49  Tube Feed Stop Time: 00:00  Entered: Aug 26 2023  2:44PM  
This patient has been assessed with a concern for Malnutrition and has been determined to have a diagnosis/diagnoses of Severe protein-calorie malnutrition.    This patient is being managed with:   Diet NPO with Tube Feed-  Tube Feeding Modality: Nasogastric  Nepro with Carb Steady (NEPRORTH)  Total Volume for 24 Hours (mL): 960  Continuous  Starting Tube Feed Rate {mL per Hour}: 40  Until Goal Tube Feed Rate (mL per Hour): 40  Tube Feed Duration (in Hours): 24  Tube Feed Start Time: 13:45  No Carb Prosource (1pkg = 15gms Protein)     Qty per Day:  1  Entered: Oct  2 2023  1:45PM  
This patient has been assessed with a concern for Malnutrition and has been determined to have a diagnosis/diagnoses of Severe protein-calorie malnutrition.    This patient is being managed with:   Diet NPO with Tube Feed-  Tube Feeding Modality: Nasogastric  Glucerna 1.5 Judd (GLUCERNA1.5RTH)  Total Volume for 24 Hours (mL): 960  Continuous  Starting Tube Feed Rate {mL per Hour}: 10  Increase Tube Feed Rate by (mL): 10     Every 4 hours  Until Goal Tube Feed Rate (mL per Hour): 40  Tube Feed Duration (in Hours): 24  Tube Feed Start Time: 17:30  No Carb Prosource (1pkg = 15gms Protein)     Qty per Day:  1  Entered: Oct 20 2023  6:52PM  
This patient has been assessed with a concern for Malnutrition and has been determined to have a diagnosis/diagnoses of Severe protein-calorie malnutrition.    This patient is being managed with:   Diet NPO with Tube Feed-  Tube Feeding Modality: Nasogastric  Glucerna 1.5 Judd (GLUCERNA1.5RTH)  Total Volume for 24 Hours (mL): 960  Continuous  Starting Tube Feed Rate {mL per Hour}: 50  Until Goal Tube Feed Rate (mL per Hour): 40  Tube Feed Duration (in Hours): 24  Tube Feed Start Time: 17:30  No Carb Prosource (1pkg = 15gms Protein)     Qty per Day:  1  Entered: Oct 11 2023 11:52AM  
This patient has been assessed with a concern for Malnutrition and has been determined to have a diagnosis/diagnoses of Severe protein-calorie malnutrition.    This patient is being managed with:   Diet NPO with Tube Feed-  Tube Feeding Modality: Nasogastric  Nepro with Carb Steady (NEPRORTH)  Total Volume for 24 Hours (mL): 840  Continuous  Starting Tube Feed Rate {mL per Hour}: 10  Increase Tube Feed Rate by (mL): 10     Every 4 hours  Until Goal Tube Feed Rate (mL per Hour): 35  Tube Feed Duration (in Hours): 24  Tube Feed Start Time: 17:49  Tube Feed Stop Time: 00:00  No Carb Prosource (1pkg = 15gms Protein)     Qty per Day:  1  Banatrol TF     Qty per Day:  2  Entered: Sep 12 2023  2:26PM  
This patient has been assessed with a concern for Malnutrition and has been determined to have a diagnosis/diagnoses of Severe protein-calorie malnutrition.    This patient is being managed with:   Diet NPO with Tube Feed-  Tube Feeding Modality: Nasogastric  Nepro with Carb Steady (NEPRORTH)  Total Volume for 24 Hours (mL): 960  Continuous  Starting Tube Feed Rate {mL per Hour}: 10  Increase Tube Feed Rate by (mL): 10     Every 4 hours  Until Goal Tube Feed Rate (mL per Hour): 40  Tube Feed Duration (in Hours): 24  Tube Feed Start Time: 17:49  Tube Feed Stop Time: 00:00  No Carb Prosource (1pkg = 15gms Protein)     Qty per Day:  1  Entered: Sep  6 2023  2:01PM  
This patient has been assessed with a concern for Malnutrition and has been determined to have a diagnosis/diagnoses of Severe protein-calorie malnutrition.    This patient is being managed with:   Diet NPO with Tube Feed-  Tube Feeding Modality: Nasogastric  Glucerna 1.5 Judd (GLUCERNA1.5RTH)  Total Volume for 24 Hours (mL): 960  Continuous  Starting Tube Feed Rate {mL per Hour}: 10  Increase Tube Feed Rate by (mL): 10     Every 4 hours  Until Goal Tube Feed Rate (mL per Hour): 40  Tube Feed Duration (in Hours): 24  Tube Feed Start Time: 17:30  No Carb Prosource (1pkg = 15gms Protein)     Qty per Day:  1  Entered: Oct 20 2023  6:52PM  
This patient has been assessed with a concern for Malnutrition and has been determined to have a diagnosis/diagnoses of Severe protein-calorie malnutrition.    This patient is being managed with:   Diet NPO with Tube Feed-  Tube Feeding Modality: Orogastric  Nepro with Carb Steady (NEPRORTH)  Total Volume for 24 Hours (mL): 280  Continuous  Starting Tube Feed Rate {mL per Hour}: 10  Increase Tube Feed Rate by (mL): 10     Every 4 hours  Until Goal Tube Feed Rate (mL per Hour): 40  Tube Feed Duration (in Hours): 7  Tube Feed Start Time: 17:49  Tube Feed Stop Time: 00:00  Entered: Aug 26 2023  2:44PM  
This patient has been assessed with a concern for Malnutrition and has been determined to have a diagnosis/diagnoses of Severe protein-calorie malnutrition.    This patient is being managed with:   Diet NPO with Tube Feed-  Tube Feeding Modality: Nasogastric  Nepro with Carb Steady (NEPRORTH)  Total Volume for 24 Hours (mL): 840  Continuous  Starting Tube Feed Rate {mL per Hour}: 10  Increase Tube Feed Rate by (mL): 10     Every 4 hours  Until Goal Tube Feed Rate (mL per Hour): 35  Tube Feed Duration (in Hours): 24  Tube Feed Start Time: 17:49  Tube Feed Stop Time: 00:00  No Carb Prosource (1pkg = 15gms Protein)     Qty per Day:  1  Entered: Sep  7 2023  3:58PM

## 2023-11-29 NOTE — PROGRESS NOTE ADULT - ATTENDING SUPERVISION STATEMENT
Fellow
Resident
Fellow
Resident
Fellow
Resident
Fellow
Resident
Resident/Student
Resident
Resident/Student
Resident

## 2023-11-29 NOTE — PROGRESS NOTE ADULT - PROBLEM SELECTOR PLAN 5
Not on AC due to anemia
remains on heparin gtt  monitor Hgb
remains on Heparin gtt . Consider DC given drop in Hgb   need to address GOC with family. ? IVC Filter placement   monitor Hgb
restarted Eliquis  monitor Hgb
Holding Eliquis  monitor Hgb
On eliquis   monitor Hgb
restarted Eliquis  monitor Hgb
restarted Eliquis  monitor Hgb
Holding Eliquis  monitor Hgb
Now again off Eliquis , started Heparin gtt    monitor Hgb
Off AC   monitor Hgb
Off AC   monitor Hgb
heparin gtt  need to address GOC with family - ?IVC Filter placement   monitor Hgb
remains on Heparin gtt --> consider DC given drop in Hgb   need to address GOC with family - ?IVC Filter placement   monitor Hgb
remains on heparin gtt  monitor Hgb
restarted Eliquis   monitor Hgb
Now again off Eliquis   monitor Hgb
remains on heparin gtt  monitor Hgb
remains on heparin gtt  monitor Hgb
restarted Eliquis  monitor Hgb
Holding Eliquis  monitor Hgb
On eliquis   monitor Hgb
Started Heparin gtt   monitor Hgb
remains on Heparin gtt . Consider DC given drop in Hgb   need to address GOC with family. ? IVC Filter placement   monitor Hgb
restarted Eliquis  monitor Hgb
restarted Eliquis but dropping Hgb   monitor Hgb
Holding Eliquis  monitor Hgb
Holding Eliquis  monitor Hgb
restarted Eliquis  monitor Hgb
restarted Eliquis but dropping Hgb   monitor Hgb
Holding Eliquis  monitor Hgb
Holding Eliquis  monitor Hgb
Now again off Eliquis , started Heparin gtt    monitor Hgb
Holding Eliquis  monitor Hgb
Off AC   monitor Hgb
remains on heparin gtt  monitor Hgb
restarted Eliquis  monitor Hgb
restarted Eliquis  monitor Hgb
restarted Eliquis but dropping Hgb   monitor Hgb
remains on Heparin gtt --> consider DC given drop in Hgb   need to address GOC with family - ?IVC Filter placement   monitor Hgb
Heparin gtt  need to address GOC with family - ?IVC Filter placement   monitor Hgb
Holding Eliquis  monitor Hgb
Now again off Eliquis   monitor Hgb
Now again off Eliquis , started Heparin gtt    monitor Hgb
restarted Eliquis but dropping Hgb   monitor Hgb
restarted Eliquis but dropping Hgb   monitor Hgb
Holding Eliquis  monitor Hgb
Now again off Eliquis , started Heparin gtt    monitor Hgb
On eliquis   monitor Hgb
restarted Eliquis   monitor Hgb
restarted Eliquis  monitor Hgb
Holding Eliquis  monitor Hgb
Now again off Eliquis   monitor Hgb
On eliquis   monitor Hgb
remains on Heparin gtt . Consider DC given drop in Hgb   need to address GOC with family. ? IVC Filter placement   monitor Hgb
Holding Eliquis  monitor Hgb
remains on Heparin gtt --> consider DC given drop in Hgb   need to address GOC with family - ?IVC Filter placement   monitor Hgb
restarted Eliquis  monitor Hgb
On eliquis   monitor Hgb
On eliquis   monitor Hgb
off Heparin gtt  need to address GOC with family - ?IVC Filter placement   monitor Hgb
remains on Heparin gtt --> consider DC given drop in Hgb   need to address GOC with family - ?IVC Filter placement   monitor Hgb
restarted Eliquis  monitor Hgb
off Heparin gtt  need to address GOC with family - ?IVC Filter placement   monitor Hgb
Holding Eliquis  monitor Hgb
Holding Eliquis  monitor Hgb
remains on heparin gtt  monitor Hgb
restarted Eliquis  monitor Hgb
Off AC   monitor Hgb
restarted Eliquis  monitor Hgb
remains on heparin gtt  monitor Hgb
restarted Eliquis but dropping Hgb   monitor Hgb

## 2023-11-29 NOTE — DISCHARGE NOTE FOR THE EXPIRED PATIENT - HOSPITAL COURSE
72 y/o F DM on insulin, HTN, CKD,breast CA, respiratory arrest and cardiac arrest (2018), CVA with residual weakness, aspiration PNA h/o trach, PEG, both removed presented with dyspnea and was admitted to MICU with AHRF requiring intubation and septic shock requiring pressors. She had a prolonged hospitalization complicated by ESBL ecoli and MSSA VAP w/ MSSA bacteremia, pleural effusions, tracheolamalcia, CVA w/ multiple new infarcts, HFpEF with decompensation, left eye uveitis and left acute on chronic otomastoiditis, LLE DVT, GIB iso AC. Trach replaced during admission. Unable to tolerate HD 2/2 hypotension and increasing pressor requirements. Pt had gradually worsening respiratory status and required paralytics. Chest tube placed for R pleural effusion. Started on abx regimen of Vancomycin, Polymyxin, and Flagyl. Patient had worsening blood pressures and increasing pressor requirements. Due to poor prognosis and worsening clinical condition, family made decision for comfort care. Called to bedside and pt noted to be in asystole, pt pronounced dead.

## 2023-11-29 NOTE — PROGRESS NOTE ADULT - ASSESSMENT
73 year old with DM, prior CVA, kidney disease  Now with respiratory failure  Requiring HD- but difficulty tolerating      Leukocytosis    Known colonization with MDRO pseudomonas    S/p pigtail for septated pleural effusion  Cultures without growth    Continue polymixin/ flagyl  Continue vancomycin- dose by daily level    Follow up cultures    She will remain at high risk for recurrent infection including pneumonia, soft tissue injections and blood stream infections.     The risk of infection is higher with temporary lines than with a permcath.     Her overall prognosis is grave.  Her airways are chronically colonized.  She is at risk for recurrent pna with MDRO organisms and has limited treatment options. Prior cultures with MDR organisms and she had a drug reaction to cephalosporins.      Prognosis grave. Options limited  Continue GOC discussions    Case dw ICU  Call ID service with questions

## 2023-11-29 NOTE — CHART NOTE - NSCHARTNOTEFT_GEN_A_CORE
Dr. Rome Cho, PGY 2  Internal Medicine    Death Note:     Called to bedside to evaluate the patient for asystole.    On physical exam, patient did not respond to verbal or noxious stimuli.  No spontaneous respirations.  Absent heart and breath sounds.  Absent radial and carotid pulses.   Pupils are fixed and dilated, no corneal reflex.  EKG rhythm strip shows asystole.   Patient pronounced dead at 14:58. Attending notified. Dr. Yasir Muñoz, PGY 1  Internal Medicine    Death Note:     Called to bedside to evaluate the patient for asystole.    On physical exam, patient did not respond to verbal or noxious stimuli.  No spontaneous respirations.  Absent heart and breath sounds.  Absent radial and carotid pulses.   Pupils are fixed and dilated, no corneal reflex.  EKG rhythm strip shows asystole.   Patient pronounced dead at 14:58. Attending notified. Family declined autopsy.

## 2023-11-29 NOTE — PROGRESS NOTE ADULT - SUBJECTIVE AND OBJECTIVE BOX
Subjective: Patient seen and examined. No new events except as noted.   remains in ICU       REVIEW OF SYSTEMS:    Unable to obtain     MEDICATIONS:  MEDICATIONS  (STANDING):  albuterol/ipratropium for Nebulization 3 milliLiter(s) Nebulizer every 6 hours  artificial  tears Solution 1 Drop(s) Both EYES every 4 hours  aspirin  chewable 81 milliGRAM(s) Enteral Tube daily  atorvastatin 10 milliGRAM(s) Oral at bedtime  calamine/zinc oxide Lotion 1 Application(s) Topical daily  chlorhexidine 0.12% Liquid 15 milliLiter(s) Oral Mucosa every 12 hours  chlorhexidine 2% Cloths 1 Application(s) Topical daily  chlorhexidine 4% Liquid 1 Application(s) Topical <User Schedule>  dexMEDEtomidine Infusion 0.2 MICROgram(s)/kG/Hr (3.33 mL/Hr) IV Continuous <Continuous>  dextrose 5%. 1000 milliLiter(s) (100 mL/Hr) IV Continuous <Continuous>  dextrose 5%. 1000 milliLiter(s) (50 mL/Hr) IV Continuous <Continuous>  dextrose 50% Injectable 12.5 Gram(s) IV Push once  dextrose 50% Injectable 25 Gram(s) IV Push once  dextrose 50% Injectable 25 Gram(s) IV Push once  dextrose 50% Injectable 25 Gram(s) IV Push once  droxidopa 600 milliGRAM(s) Oral every 8 hours  fentaNYL   Infusion. 1 MICROgram(s)/kG/Hr (6.65 mL/Hr) IV Continuous <Continuous>  ferrous    sulfate Liquid 300 milliGRAM(s) Oral daily  glucagon  Injectable 1 milliGRAM(s) IntraMuscular once  heparin   Injectable 5000 Unit(s) SubCutaneous every 8 hours  insulin lispro (ADMELOG) corrective regimen sliding scale   SubCutaneous every 6 hours  insulin NPH human recombinant 6 Unit(s) SubCutaneous every 6 hours  metroNIDAZOLE  IVPB 500 milliGRAM(s) IV Intermittent every 8 hours  midodrine. 40 milliGRAM(s) Oral <User Schedule>  norepinephrine Infusion 0.05 MICROgram(s)/kG/Min (6.23 mL/Hr) IV Continuous <Continuous>  pantoprazole  Injectable 40 milliGRAM(s) IV Push two times a day  petrolatum Ophthalmic Ointment 1 Application(s) Both EYES four times a day  polymyxin B IVPB [id number] Unit(s) IV Intermittent every 12 hours  propofol Infusion 5 MICROgram(s)/kG/Min (2 mL/Hr) IV Continuous <Continuous>  sodium chloride 1 Gram(s) Oral two times a day  sodium chloride 3%  Inhalation 4 milliLiter(s) Inhalation every 6 hours      PHYSICAL EXAM:  T(C): 35.9 (11-29-23 @ 08:00), Max: 37 (11-29-23 @ 00:00)  HR: 101 (11-29-23 @ 10:00) (74 - 127)  BP: --  RR: 15 (11-29-23 @ 10:00) (0 - 29)  SpO2: 99% (11-29-23 @ 10:00) (84% - 100%)  Wt(kg): --  I&O's Summary    28 Nov 2023 07:01  -  29 Nov 2023 07:00  --------------------------------------------------------  IN: 3638.8 mL / OUT: 1165 mL / NET: 2473.8 mL    29 Nov 2023 07:01  -  29 Nov 2023 10:32  --------------------------------------------------------  IN: 848 mL / OUT: 0 mL / NET: 848 mL          Appearance: NAD, +trach  +NGT  HEENT: dry oral mucosa  Lymphatic: No lymphadenopathy  Cardiovascular: Normal S1 S2, No JVD, No murmurs, No edema  Respiratory: Decreased BS, + trach to vent	  Neuro: opens eyes  Gastrointestinal: Soft, Non-tender, + BS, + NGT  Skin: No rashes, No ecchymoses, No cyanosis	  Extremities: No strength/ROM 2/2 sedation, + BL LE edema  Vascular: Peripheral pulses palpable 2+ bilaterally  Anasarca   Mode: AC/ CMV (Assist Control/ Continuous Mandatory Ventilation), RR (machine): 29, FiO2: 50, PEEP: 5, ITime: 0.8, MAP: 20, PC: 35, PIP: 43  Plateau pressure:   P/F ratio:       LABS:    CARDIAC MARKERS:                                6.3    40.06 )-----------( 411      ( 29 Nov 2023 00:30 )             22.4     11-29    141  |  109<H>  |  21  ----------------------------<  158<H>  3.1<L>   |  22  |  1.02    Ca    6.4<LL>      29 Nov 2023 00:30  Phos  2.9     11-29  Mg     1.60     11-29    TPro  4.3<L>  /  Alb  1.0<L>  /  TBili  <0.2  /  DBili  x   /  AST  14  /  ALT  12  /  AlkPhos  218<H>  11-29      TELEMETRY: 	SR    ECG:  	  RADIOLOGY: < from: Xray Chest 1 View- PORTABLE-Urgent (Xray Chest 1 View- PORTABLE-Urgent .) (11.27.23 @ 21:34) >    ACC: [id number] EXAM:  XR CHEST PORTABLE URGENT 1V   ORDERED BY: GABE HARRIS     ACC: [id number] EXAM:  XR CHEST PORTABLE URGENT 1V   ORDERED BY: NEGRO HOOVER     PROCEDURE DATE:  11/27/2023          INTERPRETATION:  CLINICAL INFORMATION: Endotracheal tube placement; chest   tube.    TIME OF EXAMINATION: November 27, 2023 at 5:58 PM and 9:24 PM    EXAM: Portable chest    FINDINGS:  5:58 PM:  Tracheostomy and enteric tubes are seen unchanged. Large layering right   effusion about the same. Small left effusion unchanged. Visible lungs are   clear. Heart difficult to evaluate on this view.    No pneumothorax.    9:24 PM:  The tracheostomy and enteric tube in addition to the right IJ   hemodialysis catheter are unchanged.    Right-sided pigtail catheter has been introduced and the effusion has   decreased. No complicating pneumothorax.    Left lung and possible small effusion on this side are unchanged.  Heart size is stable.        COMPARISON: November 22        IMPRESSION: Follow-up studies pre and post pigtail catheter placement on   right side with decreasing right effusion.    --- End of Report ---      < end of copied text >    DIAGNOSTIC TESTING:  [ ] Echocardiogram:  [ ]  Catheterization:  [ ] Stress Test:    OTHER:

## 2023-11-29 NOTE — PROGRESS NOTE ADULT - PROBLEM SELECTOR PLAN 3
HD   Nephrology following
HD/diuresis  off diuretics for now  Nephrology following
Nephrology following
HD/diuresis  c/w bumex  Nephrology following
HD/diuresis  off diuretics for now  Nephrology following
HD/diuresis  transferred to MICU for vasopressor support while on HD.
Nephrology following
HD   Nephrology following
HD/diuresis  Plan for HD catheter   transferred to MICU for vasopressor support while on HD.
HD/diuresis  c/w bumex  Nephrology following
HD/diuresis  transferred to MICU for vasopressor support while on HD.
s/p HD   Nephrology following
s/p HD   for repeat session today   Nephrology following
HD   Nephrology following
HD/diuresis  Plan for HD catheter   transferred to MICU for vasopressor support while on HD.
HD/diuresis  c/w bumex  Nephrology following
HD/diuresis  transferred to MICU for vasopressor support while on HD.
- Continue with  broad spectrum antibiotics- now on meropenem  - ID recommendation appreciated for duration and length of antibiotics  - No acute ENT intervention needed at this time  - ENT sign off  - Please follow up with Dr. Feng outpatient  - Case discussed with Dr. Feng.
- Continue with  broad spectrum antibiotics- now on meropenem  - ID recommendation appreciated for duration and length of antibiotics  - No acute ENT intervention needed at this time  - ENT sign off  - Please follow up with Dr. Feng outpatient  - Case discussed with Dr. Feng.
HD   Nephrology following
HD/diuresis  transferred to MICU for vasopressor support while on HD.
s/p HD   Nephrology following
HD   Nephrology following
HD/diuresis  Plan for HD catheter   transferred to MICU for vasopressor support while on HD.
HD/diuresis  off diuretics for now  Nephrology following
HD/diuresis  transferred to MICU for vasopressor support while on HD.
Nephrology following
- Continue with  broad spectrum antibiotics- now on meropenem  - ID recommendation appreciated  - Case discussed with Dr. Feng
HD   Nephrology following
HD   Nephrology following
HD/diuresis  off diuretics for now  Nephrology following
HD/diuresis  off diuretics for now  Nephrology following
HD/diuresis  transferred to MICU for vasopressor support while on HD.
s/p HD   for repeat session today   Nephrology following
HD/diuresis  Plan for HD catheter   transferred to MICU for vasopressor support while on HD.
HD/diuresis  nontunneled HD catheter.
HD/diuresis  off diuretics for now  Nephrology following
HD/diuresis  transferred to MICU for vasopressor support while on HD.
s/p HD   Nephrology following
HD   Nephrology following
HD/diuresis  Plan for bedside nontunneled HD catheter.  transferred to MICU for vasopressor support while on HD.
HD/diuresis  c/w bumex  Nephrology following
HD/diuresis  off diuretics for now  Nephrology following
HD/diuresis  transferred to MICU for vasopressor support while on HD.
Nephrology following
s/p HD   Nephrology following
s/p HD   Nephrology following
HD   Nephrology following
HD/diuresis  Plan for HD catheter   transferred to MICU for vasopressor support while on HD.
HD/diuresis  Plan for bedside nontunneled HD catheter.  transferred to MICU for vasopressor support while on HD.
HD/diuresis  c/w bumex  Nephrology following
HD/diuresis  nontunneled HD catheter.
HD/diuresis  transferred to MICU for vasopressor support while on HD.
s/p HD   Nephrology following
- Continue with broad spectrum antibiotics  - ID recommendation appreciated for duration and length of antibiotics  - No acute ENT intervention needed at this time, ENT will sign off  - Please follow up with Dr. Feng outpatient
HD   Nephrology following
HD   Nephrology following
HD/diuresis  c/w bumex  Nephrology following
HD/diuresis  c/w bumex  Nephrology following
HD/diuresis  off diuretics for now  Nephrology following
HD/diuresis  off diuretics for now  Nephrology following
s/p HD   Nephrology following
HD/diuresis  transferred to MICU for vasopressor support while on HD.
HD/diuresis  transferred to MICU for vasopressor support while on HD.
s/p HD   for repeat session today   Nephrology following
HD/diuresis  Plan for HD catheter   transferred to MICU for vasopressor support while on HD.
HD/diuresis  c/w bumex  Nephrology following
HD/diuresis  off diuretics for now  Nephrology following
HD/diuresis  off diuretics for now  Nephrology following
HD/diuresis  transferred to MICU for vasopressor support while on HD.
HD/diuresis  Plan for HD catheter   transferred to MICU for vasopressor support while on HD.
HD/diuresis  c/w bumex  Nephrology following
s/p HD   Nephrology following
HD/diuresis  off diuretics for now  Nephrology following
HD/diuresis  Plan for HD catheter   transferred to MICU for vasopressor support while on HD.
HD/diuresis  Plan for HD catheter   transferred to MICU for vasopressor support while on HD.
HD/diuresis  c/w bumex  Nephrology following
HD/diuresis  transferred to MICU for vasopressor support while on HD.
HD/diuresis  Plan for bedside nontunneled HD catheter.  transferred to MICU for vasopressor support while on HD.
HD/diuresis  c/w bumex  Nephrology following
HD/diuresis  nontunneled HD catheter.
HD/diuresis  nontunneled HD catheter.
HD/diuresis  transferred to MICU for vasopressor support while on HD.
HD/diuresis  nontunneled HD catheter.
HD/diuresis  c/w bumex  Nephrology following
HD/diuresis  off diuretics for now  Nephrology following

## 2023-11-30 LAB
-  AZTREONAM: SIGNIFICANT CHANGE UP
-  AZTREONAM: SIGNIFICANT CHANGE UP
-  CEFEPIME: SIGNIFICANT CHANGE UP
-  CEFEPIME: SIGNIFICANT CHANGE UP
-  CEFTAZIDIME: SIGNIFICANT CHANGE UP
-  CEFTAZIDIME: SIGNIFICANT CHANGE UP
-  CIPROFLOXACIN: SIGNIFICANT CHANGE UP
-  CIPROFLOXACIN: SIGNIFICANT CHANGE UP
-  IMIPENEM: SIGNIFICANT CHANGE UP
-  IMIPENEM: SIGNIFICANT CHANGE UP
-  LEVOFLOXACIN: SIGNIFICANT CHANGE UP
-  LEVOFLOXACIN: SIGNIFICANT CHANGE UP
-  MEROPENEM: SIGNIFICANT CHANGE UP
-  MEROPENEM: SIGNIFICANT CHANGE UP
-  PIPERACILLIN/TAZOBACTAM: SIGNIFICANT CHANGE UP
-  PIPERACILLIN/TAZOBACTAM: SIGNIFICANT CHANGE UP
METHOD TYPE: SIGNIFICANT CHANGE UP
METHOD TYPE: SIGNIFICANT CHANGE UP

## 2023-12-02 LAB
CULTURE RESULTS: ABNORMAL
CULTURE RESULTS: ABNORMAL
ORGANISM # SPEC MICROSCOPIC CNT: ABNORMAL
SPECIMEN SOURCE: SIGNIFICANT CHANGE UP
SPECIMEN SOURCE: SIGNIFICANT CHANGE UP

## 2023-12-03 LAB
CULTURE RESULTS: SIGNIFICANT CHANGE UP
CULTURE RESULTS: SIGNIFICANT CHANGE UP
SPECIMEN SOURCE: SIGNIFICANT CHANGE UP
SPECIMEN SOURCE: SIGNIFICANT CHANGE UP

## 2023-12-07 NOTE — SWALLOW BEDSIDE ASSESSMENT ADULT - SWALLOW EVAL: CURRENT DIET
[facility] Medicine  Progress Note    Patient Name: Riley Fox  MRN: [id number]  Patient Class: IP- Inpatient   Admission Date: 11/28/2023  Length of Stay: 9 days  Attending Physician: Chuy Washburn DO  Primary Care Provider: Paulette, Primary Doctor        Subjective:     Principal Problem:Abscess, toe, left        HPI:  Patient is a 79-year-old male with past medical history of hypertension, spinal stenosis, peripheral arterial disease, questionable dementia, chronic kidney disease, who presents to the emergency room for evaluation of left great toe osteomyelitis along with cellulitis of left lower extremity.  Patient is a long-term resident at the nursing home secondary to his spinal stenosis and resulting immobility, it appears as though he has some contractures of his lower extremity, his son is at bedside, he tells me that the patient has had some memory issues lately, he also tells me that he has history of CVA in the past.  Patient endorses pain in bilateral lower extremities, mostly on the affected side that is the left great toe, although the right lower extremity also appears to have some wounds.  General surgery evaluated the patient in the ER and recommended the patient to take to the OR for debridement and possible amputation of the affected toe, followed by admission to [facility] nurse for long-term wound care and IV antibiotics.  Patient otherwise is hemodynamically stable, denies any chest pain, denies any shortness of breath, denies any fevers chills nausea vomiting or diarrhea.    Overview/Hospital Course:  12/1- chart reviewed, continue vanco for 10 days  12/2-no complaints, doing well. Continue abx. Will return to [facility] at discharge  12/3-end date abx 12/11.  Daughter in room today, updated. No questions.    12/5-doing well, no issues  12/6-no issues will complete abx in 5 days.     Interval History: naeo    Review of  Systems   Constitutional: Negative.  Negative for chills and fever.   HENT: Negative.     Eyes:  Negative for pain and redness.   Respiratory:  Negative for cough, chest tightness, shortness of breath and wheezing.    Cardiovascular:  Negative for chest pain and palpitations.   Gastrointestinal:  Negative for abdominal pain, diarrhea, nausea and vomiting.   Endocrine: Negative for cold intolerance, heat intolerance and polyuria.   Genitourinary:  Negative for difficulty urinating, dysuria, frequency and hematuria.   Musculoskeletal:  Negative for arthralgias, back pain, myalgias, neck pain and neck stiffness.   Skin:  Negative for pallor and rash.   Allergic/Immunologic: Negative.    Neurological:  Negative for dizziness, syncope, weakness, numbness and headaches.   Hematological:  Negative for adenopathy.   Psychiatric/Behavioral:  Negative for agitation, confusion and hallucinations. The patient is not nervous/anxious.      Objective:     Vital Signs (Most Recent):  Temp: 97.5 °F (36.4 °C) (12/07/23 0800)  Pulse: 79 (12/07/23 0800)  Resp: 20 (12/07/23 0800)  BP: (!) 114/54 (12/07/23 0800)  SpO2: 97 % (12/07/23 0800) Vital Signs (24h Range):  Temp:  [97.5 °F (36.4 °C)-97.9 °F (36.6 °C)] 97.5 °F (36.4 °C)  Pulse:  [62-92] 79  Resp:  [16-20] 20  SpO2:  [95 %-97 %] 97 %  BP: (114-148)/(54-85) 114/54     Weight: 78 kg (171 lb 15.3 oz)  Body mass index is 22.69 kg/m².    Intake/Output Summary (Last 24 hours) at 12/7/2023 1044  Last data filed at 12/7/2023 0918  Gross per 24 hour   Intake 1720 ml   Output --   Net 1720 ml           Physical Exam  Vitals reviewed.   Cardiovascular:      Rate and Rhythm: Normal rate and regular rhythm.      Heart sounds: Normal heart sounds.   Pulmonary:      Effort: Pulmonary effort is normal.      Breath sounds: Normal breath sounds.   Abdominal:      General: Abdomen is flat. Bowel sounds are normal.      Palpations: Abdomen is soft.   Musculoskeletal:         General: Signs of injury  present.      Comments: Wrapped left foot   Neurological:      Mental Status: He is alert.             Significant Labs: All pertinent labs within the past 24 hours have been reviewed.    Significant Imaging: I have reviewed all pertinent imaging results/findings within the past 24 hours.    Assessment/Plan:      * Abscess, toe, left  Pt seen in ER for surgical eval, gen surg recommend iv abx and OR  [facility] post intervention  Cont iv abs  Wound care  Follow cx    11/29 - Now s/p amputation.  Continue abx.   11/30 - S/p L great toe amputation.  Continue abx for 1-2 weeks for soft tissue infection of the foot.   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  Stable    HTN (hypertension)  Cont home meds  Chronic controlled    PVD (peripheral vascular disease)  Cont DAPT  Statin  Monitor wound healing.       Cellulitis  Vanco, tentative end date 12/10        VTE Risk Mitigation (From admission, onward)      None            Discharge Planning   JORI:      Code Status: Full Code   Is the patient medically ready for discharge?:     Reason for patient still in hospital (select all that apply): Treatment  Discharge Plan A: Return to nursing home                  Chuy Washburn DO  Department of Hospital Medicine   [facility]     minced-moist/mildly thick liquids.

## 2024-02-29 NOTE — ED ADULT NURSE NOTE - BIRTH SEX
[facility]    Hematology - Oncology Consultation              Date 2/29/2024  Patient Igor Brunner  YOB: 1996  MRN: [id number]  Referred by Chrissie Kraft PA-C    Diagnosis  Consult for polycythemia and leukocytosis        Chief Complaint    Igor Brunner is a 27 year old male whom I am seeing at the kind request of Chrissie Kraft PA-C for further evaluation and management of polycythemia and h/o leukocytosis.          History of Presenting Illness      27 year old male with no PMH referred for polycythemia      Labs -    2/5/24 -  Wbc normal at 6.9  Hgb at 17.3  Hct 50.2%  Plt normal at 308K      03/2019 -  (pt does not remember why he had labs drawn during this time)  Wbc 18,   hgb 18.8,  Hct 54.2%  Plt 268K      11/2016 -  (pt was having abdominal pain/diarrhea/emesis when these labs were drawn)  Wbc - 14.0  Hgb 17.4  Hct 50.9%  Plt normal    TSH, CMP normal     pt is a never smoker.  No h/o steroids or testosterone use.  No h/o snoring or apneic episodes during sleep.      He feels well overall.   Only symptom is chronic intermittent diarrhea.        Patient's medications, allergies, past medical, surgical, social and family histories were reviewed and updated as appropriate.    Past Medical History  None    Past Surgical History   Past Surgical History:   Procedure Laterality Date   • Shoulder surgery     Injection in lower back for herniated disc      Family History - no h/o of heme or onc issues.      Family History   Problem Relation Age of Onset   • Hypertension Mother    • Diabetes Mother    • Hypertension Father    • Diabetes Father    • Hypertension Sister    • Diabetes Sister    • Cancer, Prostate Neg Hx    • Cancer, Colon Neg Hx         Social History - no tobacco.  ETOH - socially.   No drugs.  .  No children.       Medications     Current Outpatient Medications   Medication Sig   • Omega 3 1200 MG Cap Take 1,200 capsules by mouth daily.   • Multiple  Female

## 2024-03-16 NOTE — PROCEDURE NOTE - NSPRE-BRON/TUBRISKASSES_GEN_ALL_CORE
I evaluated the patient prior to bronchoscopy procedure for active pulmonary/laryngeal M. tuberculosis disease and the risk and actions taken:    Low risk with routine standard of care measures followed.
no chest pain

## 2024-06-10 NOTE — ED PROCEDURE NOTE - EBL
NEPHROLOGY CLINIC PROGRESS NOTE      DATE OF SERVICE:  6/10/2024  REFERRING PHYSICIAN:  Trinidad Monroy MD      CHIEF COMPLAINT:    Chief Complaint   Patient presents with    Follow-up         INTERVAL HISTORY:    Patient here for follow up visit.  She had seen her PCP doctor said he in May 2024 and had labs done which showed mildly elevated calcium at 10.4.  She had been eating sea espinosa supplement to help with her diabetes and also a fair amount of cheese.  She denies any excessive milk or dairy intake.  No nausea, vomiting or abdominal pain.  No diarrhea or loose stools.  She denies any dizziness or lightheadedness.  Her weight has remained stable.  Her BP has been under good control.  She is still on metformin 1000 mg b.i.d..  Her A1c was 7.6 on 05/06/2024.  She denies any significant numbness or tingling in in her extremities.  No cramping or muscle weakness or fatigue.    REVIEW OF SYSTEMS:   A complete 3 point ROS done and is neg except as mentioned above      PROBLEM LIST  Patient Active Problem List   Diagnosis    Obesity    Seborrheic dermatitis    Type 2 diabetes mellitus with proteinuria  (CMD)    Hypertension    Esophageal reflux    Sleep apnea    Chronic idiopathic urticaria    Dermographic urticaria    PND (post-nasal drip)    Allergic rhinitis          MEDICATIONS:    Current Outpatient Medications   Medication Sig Dispense Refill    polymyxin b-trimethoprim (POLYTRIM) 90466-0.1 UNIT/ML-% ophthalmic solution Place 1 drop into both eyes every 6 hours. 10 mL 0    losartan (COZAAR) 100 MG tablet Take 1 tablet by mouth daily. 90 tablet 1    fluticasone (FLONASE) 50 MCG/ACT nasal spray Spray 1 spray in each nostril daily. (Patient taking differently: Spray 1 spray in each nostril as needed.) 16 g 6    metFORMIN (GLUCOPHAGE-XR) 500 MG 24 hr tablet TAKE 2 TABLETS BY MOUTH DAILY 180 tablet 1    diphenhydrAMINE (BENADRYL) 25 MG capsule Take 25 mg by mouth every 6 hours as needed for Itching. 1/4 tab po 
prn      blood glucose (OneTouch Verio) test strip Test blood sugar 1 times daily. Diagnosis: DM2 100 strip 3    DISPENSE Cranberry tabs      allopurinol (ZYLOPRIM) 100 MG tablet Take 100 mg by mouth as needed.      Vitamin D, Ergocalciferol, 50 mcg (2,000 units) capsule Take 1 capsule by mouth. Every other day      ONETOUCH DELICA LANCETS 33G Misc To test once a day 100 each 12    omeprazole (PRILOSEC) 20 MG capsule Take 20 mg by mouth daily.       No current facility-administered medications for this visit.           OBJECTIVE:      VITAL SIGNS:  Vitals:    06/10/24 1146   BP: 138/76   Pulse: 71         PHYSICAL EXAM:    GEN: No apparent distress, sitting in chair, pleasantly conversant, obese  HEENT: No conjunctival erythema, neck supple, normocephalic head, mucous membranes moist  CHEST: Symmetrical chest wall expansion, No wheezes or crackles  CVS: S1,S2 heard, no rubs  SKIN: Warm to touch, no rashes  EXT:  Trace pedal edema, no clubbing  PSYCH: Cooperative, good affect  NEURO: Alert and oriented x 3, no asterixis      LABORATORY DATA:    Recent Labs   Lab 01/25/24  0839   WBC 8.6   HCT 40.4   HGB 12.4        Recent Labs   Lab 06/10/24  1209 05/06/24  1203   Sodium 141 138   Potassium 4.3 4.1   Chloride 108 111*   Carbon Dioxide 24 20*   BUN 22* 28*   Creatinine 1.01* 1.14*   Glucose 127* 122*                                  ASSESSMENT  CKD stage IIIA-likely due to hypertensive and diabetic nephropathy   Hypertension   Type 2 diabetes   Hypercalcemia-concern for excessive calcium intake in diet vs alternate etiology   Chronic Metabolic acidosis   Hyperuricemia   Morbid obesity       PLAN  -Patient's creatinine appears to be stable and at her baseline.  Continue with current management  -Her calcium levels have been progressively elevated over the past 6 months   -She has been eating a fair amount of cheese and also taking sea espinosa supplement.  -Unclear if her hypercalcemia related to dietary intake or 
alternate etiologies   -Checked 25 hydroxy vitamin-D and 1, 25 hydroxy vitamin-D levels which were both normal   -Checked kappa/lambda ratio which showed elevated kappa but her ratio was normal   -Her ionized calcium continues to be on the high side   -Advised patient to stop eating sea espinosa and cheese and will repeat her BMP in 1 month   -Check PTH and PTH related peptide in 1 month   -Check phosphorus levels in 1 month   -Check alkaline phosphatase levels in 1 month   -If calcium continues to trend upwards, might need further workup to rule out occult malignancy  -Acidosis improving.  Monitor for now   -Continue to avoid calcium supplementation and NSAIDs     RTC in 4 months          Thank you for providing me with the opportunity to care for this patient. Please feel free to page me at [phone] if you have any questions                
minimal
0

## 2024-06-11 NOTE — ED PROVIDER NOTE - IV ALTEPLASE EXCL REL HIDDEN
Dr Paige VO - there may be effusion or fluid in the ankle joints but that is non specific and does not make any diagnosis. MRI may be needed later .       Called pt and she verbalized understanding of MD VO.  Discussed CRP lab results again where MD states is borderline high and rest of labs are normal.  Discussed that CRP can be elevated with infection (viral and bacterial), injury to any part of the body, age, weight, cancer, kidney problems, pregnancy and more.    She will F/U with PCP for her diffuse pain.   show

## 2024-09-18 NOTE — PROGRESS NOTE ADULT - REASON FOR ADMISSION
"Anesthesia Transfer of Care Note    Patient: Josesito Moore    Procedure(s) Performed: Procedure(s) (LRB):  EXCISION, SUPERFICIAL MASS, HEAD (Right)    Patient location: PACU    Anesthesia Type: general    Transport from OR: Transported from OR on room air with adequate spontaneous ventilation    Post pain: adequate analgesia    Post assessment: no apparent anesthetic complications and tolerated procedure well    Post vital signs: stable    Level of consciousness: awake, alert and oriented    Nausea/Vomiting: no nausea/vomiting    Complications: none    Transfer of care protocol was followed      Last vitals: Visit Vitals  /74 (Patient Position: Lying)   Pulse 74   Temp 36.7 °C (98 °F) (Temporal)   Resp 17   Ht 5' 9" (1.753 m)   Wt (!) 137 kg (302 lb 0.5 oz)   LMP 08/25/2024   SpO2 98%   Breastfeeding No   BMI 44.60 kg/m²     " Respiratory distress

## 2024-11-14 NOTE — PROGRESS NOTE ADULT - PROVIDER SPECIALTY LIST ADULT
ALBERTINA ordered and scheduled per Ortho protocol.   Sent patient message via [employer] to inform them and ask them to arrive 15-20 minutes prior to appointment with    Neurology

## 2025-02-05 NOTE — PROGRESS NOTE ADULT - ASSESSMENT
Problem: Discharge Planning  Goal: Discharge to home or other facility with appropriate resources  Outcome: Progressing     Problem: Safety - Adult  Goal: Free from fall injury  Outcome: Progressing     Problem: Skin/Tissue Integrity  Goal: Skin integrity remains intact  Description: 1.  Monitor for areas of redness and/or skin breakdown  2.  Assess vascular access sites hourly  3.  Every 4-6 hours minimum:  Change oxygen saturation probe site  4.  Every 4-6 hours:  If on nasal continuous positive airway pressure, respiratory therapy assess nares and determine need for appliance change or resting period  Outcome: Progressing      ACute hypoxemic  resp failure: colinically improved  S Aureus bacteremia: septic shock, hypotension now resolved  ESRD  R sided aspiration pneumonia  S/P CAC with rib fractures and bilateral pneumothoraces  CVA with corical and subcortical infarcts  s/p cardiac arrest    REC    Continue antibiotics   Extubation trial per MICU  BIPAP on standby  COmplete antibiotics  HD per renal  Aspiration prcautions: may still need PEG - re-assess

## 2025-06-04 NOTE — PROGRESS NOTE ADULT - ASSESSMENT
67yoF with breast cancer, HTN who p/w flu, went into respiratory arrest, then PEA arrest, s/p chest tube, TPA and intubation. Course c/b shock liver, RUSS, pneumoperitoneum, and elevated cardiac enzymes. Chest tubes and peritoneal tubes removed, and shock liver now resolved. Now with Staph aureus bacteremia. Pending possible PEG/trach. Pt continues to be anuric requiring HD with fluid removal. Given the similarity of symptoms with prior episodes and given the fact the patient only took solange and coconut water at home low concern for intra-abdominal pathology and would defer on getting advanced imaging hide on differential is perimenstrual syndrome given patient's mild cramping and reassuring abdominal exam will do symptomatic treatment after confirming not pregnant and screening labs and UA

## 2025-06-23 NOTE — ED PROVIDER NOTE - DATE/TIME 4
Appt rescheduled to 06/30/2025.  
Copied from CRM #[id number]. Topic: General Inquiry - Patient Advice  >> Jun 23, 2025  2:24 PM Shara wrote:  Type:  Needs Medical Advice    Who Called: pt   Would the patient rather a call back or a response via [employer]? Call back   Best Call Back Number: [phone] (M)    Additional Information: pt states will not being making 230 appt today 6/23 due to transportation  
30-Jan-2018 18:41

## 2025-07-18 NOTE — PROVIDER CONTACT NOTE (OTHER) - SITUATION
Pt Dialysis catheter is not working. PAST SURGICAL HISTORY:  s/p Angioplasty with Stent 4 stents - 2004    S/P tonsillectomy

## (undated) DEVICE — BIOPSY FORCEP RADIAL JAW 4 STANDARD WITH NEEDLE

## (undated) DEVICE — SALIVA EJECTOR (BLUE)

## (undated) DEVICE — ELCTR ECG CONDUCTIVE ADHESIVE

## (undated) DEVICE — PACK IV START WITH CHG

## (undated) DEVICE — CLAMP BX HOT RAD JAW 3

## (undated) DEVICE — TUBING IV SET GRAVITY 3Y 100" MACRO

## (undated) DEVICE — DRSG 2X2

## (undated) DEVICE — LUBRICATING JELLY HR ONE SHOT 3G

## (undated) DEVICE — BASIN EMESIS 10IN GRADUATED MAUVE

## (undated) DEVICE — DENTURE CUP PINK

## (undated) DEVICE — CATH IV SAFE BC 22G X 1" (BLUE)

## (undated) DEVICE — BITE BLOCK ADULT 20 X 27MM (GREEN)

## (undated) DEVICE — UNDERPAD LINEN SAVER 17 X 24"

## (undated) DEVICE — TUBING MEDI-VAC W MAXIGRIP CONNECTORS 1/4"X6'

## (undated) DEVICE — DURABLE MEDICAL EQUIPMENT: Type: DURABLE MEDICAL EQUIPMENT

## (undated) DEVICE — BIOPSY FORCEP COLD DISP

## (undated) DEVICE — CONTAINER FORMALIN 80ML YELLOW

## (undated) DEVICE — LINE BREATHE SAMPLNG

## (undated) DEVICE — GOWN LG

## (undated) DEVICE — DRSG BANDAID 0.75X3"

## (undated) DEVICE — DRSG CURITY GAUZE SPONGE 4 X 4" 12-PLY NON-STERILE